# Patient Record
Sex: FEMALE | Race: AMERICAN INDIAN OR ALASKA NATIVE | NOT HISPANIC OR LATINO | Employment: FULL TIME | URBAN - METROPOLITAN AREA
[De-identification: names, ages, dates, MRNs, and addresses within clinical notes are randomized per-mention and may not be internally consistent; named-entity substitution may affect disease eponyms.]

---

## 2017-01-03 ENCOUNTER — PATIENT MESSAGE (OUTPATIENT)
Dept: GASTROENTEROLOGY | Facility: CLINIC | Age: 37
End: 2017-01-03

## 2017-01-06 ENCOUNTER — TELEPHONE (OUTPATIENT)
Dept: GASTROENTEROLOGY | Facility: CLINIC | Age: 37
End: 2017-01-06

## 2017-01-06 NOTE — TELEPHONE ENCOUNTER
Message left for patient to return my call regarding confirming her appointment with Dr.James Ross on 1/9/17 for 8:00.

## 2017-01-09 ENCOUNTER — OFFICE VISIT (OUTPATIENT)
Dept: GASTROENTEROLOGY | Facility: CLINIC | Age: 37
End: 2017-01-09
Payer: COMMERCIAL

## 2017-01-09 ENCOUNTER — LAB VISIT (OUTPATIENT)
Dept: LAB | Facility: HOSPITAL | Age: 37
End: 2017-01-09
Attending: INTERNAL MEDICINE
Payer: COMMERCIAL

## 2017-01-09 VITALS
DIASTOLIC BLOOD PRESSURE: 93 MMHG | HEART RATE: 90 BPM | WEIGHT: 100.75 LBS | BODY MASS INDEX: 19.02 KG/M2 | SYSTOLIC BLOOD PRESSURE: 133 MMHG | HEIGHT: 61 IN

## 2017-01-09 DIAGNOSIS — Z79.899 ENCOUNTER FOR LONG-TERM (CURRENT) USE OF HIGH-RISK MEDICATION: ICD-10-CM

## 2017-01-09 DIAGNOSIS — K50.819 CROHN'S DISEASE OF BOTH SMALL AND LARGE INTESTINE WITH COMPLICATION: ICD-10-CM

## 2017-01-09 DIAGNOSIS — K50.819 CROHN'S DISEASE OF BOTH SMALL AND LARGE INTESTINE WITH COMPLICATION: Primary | ICD-10-CM

## 2017-01-09 LAB
ALBUMIN SERPL BCP-MCNC: 4.2 G/DL
ALP SERPL-CCNC: 71 U/L
ALT SERPL W/O P-5'-P-CCNC: 22 U/L
ANION GAP SERPL CALC-SCNC: 10 MMOL/L
AST SERPL-CCNC: 23 U/L
BASOPHILS # BLD AUTO: 0.01 K/UL
BASOPHILS NFR BLD: 0.1 %
BILIRUB SERPL-MCNC: 0.6 MG/DL
BUN SERPL-MCNC: 9 MG/DL
CALCIUM SERPL-MCNC: 9.8 MG/DL
CHLORIDE SERPL-SCNC: 104 MMOL/L
CO2 SERPL-SCNC: 26 MMOL/L
CREAT SERPL-MCNC: 0.8 MG/DL
CRP SERPL-MCNC: 0.5 MG/L
DIFFERENTIAL METHOD: NORMAL
EOSINOPHIL # BLD AUTO: 0.1 K/UL
EOSINOPHIL NFR BLD: 1 %
ERYTHROCYTE [DISTWIDTH] IN BLOOD BY AUTOMATED COUNT: 14.2 %
EST. GFR  (AFRICAN AMERICAN): >60 ML/MIN/1.73 M^2
EST. GFR  (NON AFRICAN AMERICAN): >60 ML/MIN/1.73 M^2
GLUCOSE SERPL-MCNC: 95 MG/DL
HCT VFR BLD AUTO: 45.9 %
HGB BLD-MCNC: 15.2 G/DL
LYMPHOCYTES # BLD AUTO: 2.6 K/UL
LYMPHOCYTES NFR BLD: 35.6 %
MCH RBC QN AUTO: 30 PG
MCHC RBC AUTO-ENTMCNC: 33.1 %
MCV RBC AUTO: 91 FL
MONOCYTES # BLD AUTO: 0.9 K/UL
MONOCYTES NFR BLD: 12.4 %
NEUTROPHILS # BLD AUTO: 3.7 K/UL
NEUTROPHILS NFR BLD: 50.8 %
PLATELET # BLD AUTO: 303 K/UL
PMV BLD AUTO: 10.1 FL
POTASSIUM SERPL-SCNC: 4.3 MMOL/L
PROT SERPL-MCNC: 8.7 G/DL
RBC # BLD AUTO: 5.07 M/UL
SODIUM SERPL-SCNC: 140 MMOL/L
WBC # BLD AUTO: 7.2 K/UL

## 2017-01-09 PROCEDURE — 80053 COMPREHEN METABOLIC PANEL: CPT

## 2017-01-09 PROCEDURE — 99999 PR PBB SHADOW E&M-EST. PATIENT-LVL III: CPT | Mod: PBBFAC,,, | Performed by: INTERNAL MEDICINE

## 2017-01-09 PROCEDURE — 99214 OFFICE O/P EST MOD 30 MIN: CPT | Mod: S$GLB,,, | Performed by: INTERNAL MEDICINE

## 2017-01-09 PROCEDURE — 36415 COLL VENOUS BLD VENIPUNCTURE: CPT

## 2017-01-09 PROCEDURE — 86140 C-REACTIVE PROTEIN: CPT

## 2017-01-09 PROCEDURE — 85025 COMPLETE CBC W/AUTO DIFF WBC: CPT

## 2017-01-09 NOTE — MR AVS SNAPSHOT
[facility]  [address]  [place] [address]  Phone: [phone]  Fax: [phone]                  Ivy Salgado   2017 8:00 AM   Office Visit    Description:  Female : 1980   Provider:  Parish Ross MD   Department:  [facility]           Reason for Visit     Crohn's Disease           Diagnoses this Visit        Comments    Crohn's disease of both small and large intestine with complication    -  Primary     Encounter for long-term (current) use of high-risk medication                To Do List           Future Appointments        Provider Department Dept Phone    2017 8:45 AM LAB, APPOINTMENT [place] [facility] [phone]      Goals (5 Years of Data)     None      [facility] On [facility]     [facility] Care Line -  Assistance  Registered nurses in the [facility] provide clinical advisement, health education, appointment booking, and other advisory services.  Call for this free service at [phone].             Medications           Message regarding Medications     Verify the changes and/or additions to your medication regime listed below are the same as discussed with your clinician today.  If any of these changes or additions are incorrect, please notify your healthcare provider.             Verify that the below list of medications is an accurate representation of the medications you are currently taking.  If none reported, the list may be blank. If incorrect, please contact your healthcare provider. Carry this list with you in case of emergency.           Current Medications     alprazolam (XANAX) 0.5 MG tablet TAKE 1 TABLET BY MOUTH TWICE DAILY    certolizumab pegol (CIMZIA) 400 mg/2 mL (200 mg/mL x 2) SyKt kit Inject 400 mg (2 mLs total) into the skin every 28 days.    diphenoxylate-atropine 2.5-0.025 mg (LOMOTIL) 2.5-0.025 mg per tablet Take 1 tablet by mouth 3 (three) times daily as  "needed for Diarrhea.    hyoscyamine (LEVSIN/SL) 0.125 mg Subl Place 1 tablet (0.125 mg total) under the tongue every 4 (four) hours as needed.    imipramine (TOFRANIL) 25 MG tablet Take 1 tablet (25 mg total) by mouth every evening.    lisinopril 10 MG tablet TAKE 1 TABLET (10 MG TOTAL) BY MOUTH ONCE DAILY.    norethindrone-ethinyl estradiol (GILDESS FE 1/20, 28,) 1 mg-20 mcg (21)/75 mg (7) per tablet Take 1 tablet by mouth once daily. Take one active pill daily continuously.    ondansetron (ZOFRAN-ODT) 8 MG TbDL Take 1 tablet (8 mg total) by mouth every 8 (eight) hours as needed (nausea).    promethazine (PHENERGAN) 25 MG tablet Take 1 tablet (25 mg total) by mouth every 6 (six) hours as needed for Nausea.    sumatriptan (IMITREX) 100 MG tablet     triamcinolone acetonide 0.1% (KENALOG) 0.1 % paste Apply thin coat of paste to ulceration prn    valacyclovir (VALTREX) 500 MG tablet Take 1 tablet (500 mg total) by mouth once daily.    zolpidem (AMBIEN) 10 mg Tab Take 1 tablet (10 mg total) by mouth nightly as needed.    miconazole nitrate 2% (MICOTIN) 2 % Oint Apply topically 2 (two) times daily.           Clinical Reference Information           Vital Signs - Last Recorded  Most recent update: 1/9/2017  8:09 AM by Belinda Sullivan MA    BP Pulse Ht Wt LMP BMI    (!) 133/93 90 5' 1" (1.549 m) 45.7 kg (100 lb 12 oz) 03/30/2015 19.04 kg/m2      Blood Pressure          Most Recent Value    BP  (!)  133/93      Allergies as of 1/9/2017     Azathioprine Sodium    Methotrexate    Morphine      Immunizations Administered on Date of Encounter - 1/9/2017     None      Orders Placed During Today's Visit     Future Labs/Procedures Expected by Expires    C-reactive protein  1/9/2017 1/9/2018    CBC auto differential  1/9/2017 1/9/2018    Clostridium difficile EIA  1/9/2017 3/10/2018    Comprehensive metabolic panel  1/9/2017 1/9/2018      "

## 2017-01-09 NOTE — PROGRESS NOTES
Subjective:       Patient ID: Ivy Salgado is a 36 y.o. female.    Chief Complaint: Crohn's Disease    HPI  Review of Systems   Constitutional: Positive for activity change, appetite change, fatigue, fever and unexpected weight change. Negative for chills and diaphoresis.   HENT: Negative for congestion, ear pain, mouth sores, nosebleeds, postnasal drip, rhinorrhea, sinus pressure, sore throat, trouble swallowing and voice change.    Eyes: Negative for pain.   Respiratory: Negative for cough, shortness of breath and wheezing.    Cardiovascular: Negative for chest pain, palpitations and leg swelling.   Genitourinary: Negative for difficulty urinating, dysuria, flank pain, hematuria and menstrual problem.   Musculoskeletal: Positive for arthralgias. Negative for back pain, gait problem, joint swelling, myalgias and neck pain.   Skin: Negative for rash.   Neurological: Positive for headaches. Negative for dizziness, tremors, syncope and numbness.   Hematological: Negative for adenopathy. Does not bruise/bleed easily.   Psychiatric/Behavioral: Negative for agitation, behavioral problems, confusion, decreased concentration and dysphoric mood. The patient is not nervous/anxious.        Objective:      Physical Exam   Constitutional: She is oriented to person, place, and time. She appears well-developed and well-nourished. No distress.   HENT:   Head: Normocephalic and atraumatic.   Right Ear: External ear normal.   Left Ear: External ear normal.   Nose: Nose normal.   Mouth/Throat: Oropharynx is clear and moist. No oropharyngeal exudate.   Eyes: Conjunctivae are normal. Pupils are equal, round, and reactive to light. No scleral icterus.   Neck: Normal range of motion. Neck supple. No thyromegaly present.   Cardiovascular: Normal rate, regular rhythm, normal heart sounds and intact distal pulses.  Exam reveals no gallop.    No murmur heard.  Pulmonary/Chest: Effort normal and breath sounds normal. She has no wheezes.  She has no rales.   Abdominal: Soft. Bowel sounds are normal. She exhibits no distension and no mass. There is no tenderness. No hernia.   Musculoskeletal: Normal range of motion. She exhibits no edema or tenderness.   Lymphadenopathy:     She has no cervical adenopathy.   Neurological: She is alert and oriented to person, place, and time. No cranial nerve deficit.   Skin: Skin is warm and dry. No rash noted.   Psychiatric: She has a normal mood and affect. Her behavior is normal. Judgment and thought content normal.       Assessment:       1. Crohn's disease of both small and large intestine with complication    2. Encounter for long-term (current) use of high-risk medication        Plan:         This is a followup visit for Ivy.  A 36-year-old lady with longstanding   severe Crohn's disease, status post colectomy and ileostomy.  She is on Cimzia   right now.    She had a prolonged hospitalization in October for nearly three weeks for   partial small-bowel obstruction and increase of symptoms.  I saw her on   11/30/2016 afterwards.  At that time, I added imipramine to her medicines,   continue Zofran and continue Cimzia.    She comes in now and she is starting to deteriorate and has a number of symptoms   getting worse.  She continues with the nausea.  She is unable to eat very much   at all.  With any food intake, she will get cramping pain across the lower   abdomen.  This is a band-like pain across the abdomen without radiation.  There   is some increase in the pain right below her right lower quadrant ileostomy.    She has noticed increased watery output from her ileostomy.  The stool seems to   be more foul smelling.  She did have recent antibiotics.  She has not had C.   difficile infection in three years, but wonders whether this could be some to   what she had in the past.  She has the nausea, but no vomiting.  She is off her   prednisone.  She did have antibiotics a couple of weeks ago for an upper    respiratory infection.    ASSESSMENT AND DECISION MAKIN.  Crohn's disease.  She certainly had very difficult to control disease in the   past.  Workup on her last admit, did not show any signs of active disease that   workup included CT scan, EGD and ileoscopy.  She remains at high-risk for   disease recurrence.  I do not know what the status of the disease is in   relationship to her current symptoms.  2.  Long-term use of high-risk medicine.  3.  Chronic diarrhea.  This is her severe and active problem now.  She is having   pain, nausea and increased output.  She is at risk for complications, which   include dehydration and electrolyte abnormalities that may require another   admission to the hospital.  My first priority is to rule out C. difficile.  If   it is positive, that will be her third episode, but first in several years.  I   will probably treat with vancomycin.  We discussed fecal transplant, which would   be a novel at best, in her considering she does not have colon.  I will review   the case with Dr. Klein, perhaps we would consider a push enteroscopy from above   with the insulation of the fluid that might be more efficacious and trying to do   it through an ileoscopy.  If she does not have C. difficile now, I am not sure,   how would account for her current symptoms.  I will get labs right now to rule   out any electrolyte abnormalities or dehydration.  We will check a CRP as well.      KAROLINA/IN  dd: 2017 08:35:29 (CST)  td: 01/10/2017 02:56:12 (CST)  Doc ID   #[id number]  Job ID #[id number]    CC:

## 2017-01-10 ENCOUNTER — PATIENT MESSAGE (OUTPATIENT)
Dept: GASTROENTEROLOGY | Facility: CLINIC | Age: 37
End: 2017-01-10

## 2017-01-10 ENCOUNTER — TELEPHONE (OUTPATIENT)
Dept: GASTROENTEROLOGY | Facility: CLINIC | Age: 37
End: 2017-01-10

## 2017-01-10 ENCOUNTER — INFUSION (OUTPATIENT)
Dept: INFECTIOUS DISEASES | Facility: HOSPITAL | Age: 37
End: 2017-01-10
Attending: INTERNAL MEDICINE
Payer: COMMERCIAL

## 2017-01-10 VITALS — BODY MASS INDEX: 18.88 KG/M2 | HEIGHT: 61 IN | WEIGHT: 100 LBS

## 2017-01-10 DIAGNOSIS — K50.019 CROHN'S DISEASE OF SMALL INTESTINE WITH COMPLICATION: Primary | ICD-10-CM

## 2017-01-10 DIAGNOSIS — R19.7 ACUTE DIARRHEA: Primary | ICD-10-CM

## 2017-01-10 PROCEDURE — 96361 HYDRATE IV INFUSION ADD-ON: CPT

## 2017-01-10 PROCEDURE — 96360 HYDRATION IV INFUSION INIT: CPT

## 2017-01-10 PROCEDURE — 25000003 PHARM REV CODE 250: Performed by: INTERNAL MEDICINE

## 2017-01-10 RX ORDER — HEPARIN SODIUM (PORCINE) LOCK FLUSH IV SOLN 100 UNIT/ML 100 UNIT/ML
100 SOLUTION INTRAVENOUS
Status: CANCELLED | OUTPATIENT
Start: 2017-01-10

## 2017-01-10 RX ORDER — SODIUM CHLORIDE 0.9 % (FLUSH) 0.9 %
10 SYRINGE (ML) INJECTION
Status: CANCELLED | OUTPATIENT
Start: 2017-01-10

## 2017-01-10 RX ADMIN — SODIUM CHLORIDE 2000 ML: 0.9 INJECTION, SOLUTION INTRAVENOUS at 01:01

## 2017-01-10 NOTE — TELEPHONE ENCOUNTER
----- Message from Parish Ross MD sent at 1/9/2017  4:50 PM CST -----  Please call, the c diff is negative, but i would like to repeat later this week or early next week

## 2017-01-10 NOTE — PLAN OF CARE
Problem: Patient Care Overview (Adult)  Goal: Adult Individualization and Mutuality  Outcome: Ongoing (interventions implemented as appropriate)  Pt educated on hand washing and infection control.  Pt verbalized understanding.

## 2017-01-17 RX ORDER — ZOLPIDEM TARTRATE 10 MG/1
10 TABLET ORAL NIGHTLY PRN
Qty: 30 TABLET | Refills: 0 | Status: SHIPPED | OUTPATIENT
Start: 2017-01-17 | End: 2017-02-17 | Stop reason: SDUPTHER

## 2017-01-18 ENCOUNTER — LAB VISIT (OUTPATIENT)
Dept: LAB | Facility: HOSPITAL | Age: 37
End: 2017-01-18
Attending: INTERNAL MEDICINE
Payer: COMMERCIAL

## 2017-01-18 ENCOUNTER — PATIENT MESSAGE (OUTPATIENT)
Dept: GASTROENTEROLOGY | Facility: CLINIC | Age: 37
End: 2017-01-18

## 2017-01-18 DIAGNOSIS — R19.7 ACUTE DIARRHEA: ICD-10-CM

## 2017-01-18 LAB
C DIFF GDH STL QL: NEGATIVE
C DIFF TOX A+B STL QL IA: NEGATIVE

## 2017-01-18 PROCEDURE — 87449 NOS EACH ORGANISM AG IA: CPT

## 2017-01-20 ENCOUNTER — PATIENT MESSAGE (OUTPATIENT)
Dept: GASTROENTEROLOGY | Facility: CLINIC | Age: 37
End: 2017-01-20

## 2017-01-20 RX ORDER — ALPRAZOLAM 0.5 MG/1
TABLET ORAL
Qty: 60 TABLET | Refills: 0 | Status: ON HOLD | OUTPATIENT
Start: 2017-01-20 | End: 2017-02-20 | Stop reason: SDUPTHER

## 2017-01-25 ENCOUNTER — INFUSION (OUTPATIENT)
Dept: INFECTIOUS DISEASES | Facility: HOSPITAL | Age: 37
End: 2017-01-25
Attending: INTERNAL MEDICINE
Payer: COMMERCIAL

## 2017-01-25 VITALS — BODY MASS INDEX: 18.88 KG/M2 | HEIGHT: 61 IN | WEIGHT: 100 LBS

## 2017-01-25 DIAGNOSIS — K50.019 CROHN'S DISEASE OF SMALL INTESTINE WITH COMPLICATION: Primary | ICD-10-CM

## 2017-01-25 PROCEDURE — 25000003 PHARM REV CODE 250: Performed by: INTERNAL MEDICINE

## 2017-01-25 PROCEDURE — 96360 HYDRATION IV INFUSION INIT: CPT

## 2017-01-25 RX ORDER — SODIUM CHLORIDE 9 MG/ML
INJECTION, SOLUTION INTRAVENOUS ONCE
Status: CANCELLED
Start: 2017-02-01 | End: 2017-02-01

## 2017-01-25 RX ORDER — HEPARIN SODIUM (PORCINE) LOCK FLUSH IV SOLN 100 UNIT/ML 100 UNIT/ML
100 SOLUTION INTRAVENOUS
Status: CANCELLED | OUTPATIENT
Start: 2017-01-25

## 2017-01-25 RX ORDER — SODIUM CHLORIDE 0.9 % (FLUSH) 0.9 %
10 SYRINGE (ML) INJECTION
Status: CANCELLED | OUTPATIENT
Start: 2017-01-25

## 2017-01-25 RX ORDER — SODIUM CHLORIDE 9 MG/ML
INJECTION, SOLUTION INTRAVENOUS ONCE
Status: COMPLETED | OUTPATIENT
Start: 2017-01-25 | End: 2017-01-25

## 2017-01-25 RX ADMIN — SODIUM CHLORIDE: 0.9 INJECTION, SOLUTION INTRAVENOUS at 09:01

## 2017-02-01 ENCOUNTER — INFUSION (OUTPATIENT)
Dept: INFECTIOUS DISEASES | Facility: HOSPITAL | Age: 37
End: 2017-02-01
Attending: INTERNAL MEDICINE
Payer: COMMERCIAL

## 2017-02-01 VITALS
WEIGHT: 100 LBS | HEIGHT: 61 IN | DIASTOLIC BLOOD PRESSURE: 87 MMHG | RESPIRATION RATE: 18 BRPM | HEART RATE: 102 BPM | BODY MASS INDEX: 18.88 KG/M2 | TEMPERATURE: 99 F | SYSTOLIC BLOOD PRESSURE: 139 MMHG

## 2017-02-01 DIAGNOSIS — K50.019 CROHN'S DISEASE OF SMALL INTESTINE WITH COMPLICATION: Primary | ICD-10-CM

## 2017-02-01 PROCEDURE — 25000003 PHARM REV CODE 250: Performed by: INTERNAL MEDICINE

## 2017-02-01 PROCEDURE — 96360 HYDRATION IV INFUSION INIT: CPT

## 2017-02-01 RX ORDER — SODIUM CHLORIDE 9 MG/ML
INJECTION, SOLUTION INTRAVENOUS ONCE
Status: CANCELLED
Start: 2017-02-08 | End: 2017-02-08

## 2017-02-01 RX ORDER — SODIUM CHLORIDE 0.9 % (FLUSH) 0.9 %
10 SYRINGE (ML) INJECTION
Status: CANCELLED | OUTPATIENT
Start: 2017-02-08

## 2017-02-01 RX ORDER — HEPARIN SODIUM (PORCINE) LOCK FLUSH IV SOLN 100 UNIT/ML 100 UNIT/ML
100 SOLUTION INTRAVENOUS
Status: CANCELLED | OUTPATIENT
Start: 2017-02-08

## 2017-02-01 RX ADMIN — SODIUM CHLORIDE 1000 ML: 0.9 INJECTION, SOLUTION INTRAVENOUS at 09:02

## 2017-02-07 RX ORDER — SUMATRIPTAN SUCCINATE 100 MG/1
TABLET ORAL
Qty: 9 TABLET | Refills: 1 | Status: SHIPPED | OUTPATIENT
Start: 2017-02-07 | End: 2017-06-30 | Stop reason: SDUPTHER

## 2017-02-08 ENCOUNTER — PATIENT MESSAGE (OUTPATIENT)
Dept: OBSTETRICS AND GYNECOLOGY | Facility: CLINIC | Age: 37
End: 2017-02-08

## 2017-02-08 ENCOUNTER — PATIENT MESSAGE (OUTPATIENT)
Dept: GASTROENTEROLOGY | Facility: CLINIC | Age: 37
End: 2017-02-08

## 2017-02-08 ENCOUNTER — INFUSION (OUTPATIENT)
Dept: INFECTIOUS DISEASES | Facility: HOSPITAL | Age: 37
End: 2017-02-08
Attending: INTERNAL MEDICINE
Payer: COMMERCIAL

## 2017-02-08 VITALS — WEIGHT: 100 LBS | HEIGHT: 61 IN | BODY MASS INDEX: 18.88 KG/M2

## 2017-02-08 DIAGNOSIS — K50.019 CROHN'S DISEASE OF SMALL INTESTINE WITH COMPLICATION: Primary | ICD-10-CM

## 2017-02-08 PROCEDURE — 96361 HYDRATE IV INFUSION ADD-ON: CPT

## 2017-02-08 PROCEDURE — 25000003 PHARM REV CODE 250: Performed by: INTERNAL MEDICINE

## 2017-02-08 PROCEDURE — 96360 HYDRATION IV INFUSION INIT: CPT

## 2017-02-08 RX ORDER — SODIUM CHLORIDE 0.9 % (FLUSH) 0.9 %
10 SYRINGE (ML) INJECTION
Status: CANCELLED | OUTPATIENT
Start: 2017-02-15

## 2017-02-08 RX ORDER — HEPARIN SODIUM (PORCINE) LOCK FLUSH IV SOLN 100 UNIT/ML 100 UNIT/ML
100 SOLUTION INTRAVENOUS
Status: CANCELLED | OUTPATIENT
Start: 2017-02-15

## 2017-02-08 RX ORDER — SODIUM CHLORIDE 9 MG/ML
INJECTION, SOLUTION INTRAVENOUS ONCE
Status: CANCELLED
Start: 2017-02-15 | End: 2017-02-15

## 2017-02-08 RX ADMIN — SODIUM CHLORIDE 1000 ML: 0.9 INJECTION, SOLUTION INTRAVENOUS at 09:02

## 2017-02-10 ENCOUNTER — OFFICE VISIT (OUTPATIENT)
Dept: OBSTETRICS AND GYNECOLOGY | Facility: CLINIC | Age: 37
End: 2017-02-10
Payer: COMMERCIAL

## 2017-02-10 VITALS
SYSTOLIC BLOOD PRESSURE: 132 MMHG | HEIGHT: 61 IN | DIASTOLIC BLOOD PRESSURE: 92 MMHG | WEIGHT: 103.81 LBS | BODY MASS INDEX: 19.6 KG/M2

## 2017-02-10 DIAGNOSIS — N94.10 DYSPAREUNIA IN FEMALE: ICD-10-CM

## 2017-02-10 DIAGNOSIS — N89.8 VAGINAL DISCHARGE: Primary | ICD-10-CM

## 2017-02-10 LAB
CANDIDA RRNA VAG QL PROBE: NEGATIVE
G VAGINALIS RRNA GENITAL QL PROBE: POSITIVE
T VAGINALIS RRNA GENITAL QL PROBE: NEGATIVE

## 2017-02-10 PROCEDURE — 87480 CANDIDA DNA DIR PROBE: CPT

## 2017-02-10 PROCEDURE — 99999 PR PBB SHADOW E&M-EST. PATIENT-LVL III: CPT | Mod: PBBFAC,,, | Performed by: OBSTETRICS & GYNECOLOGY

## 2017-02-10 PROCEDURE — 99213 OFFICE O/P EST LOW 20 MIN: CPT | Mod: S$GLB,,, | Performed by: OBSTETRICS & GYNECOLOGY

## 2017-02-10 NOTE — PROGRESS NOTES
OBSTETRIC HISTORY:   OB History      Para Term  AB TAB SAB Ectopic Multiple Living    2 1 1 0 1 0 1 0 0 1         COMPREHENSIVE GYN HISTORY:  PAP History: Reports abnormal Paps. Date:  and  Treatment: Colposcopy and CKC Result of Colpo 10/3/12: ECC: Negative ECTO: Koilocytosis (aka LYLA 1)  Pap #2/3: Date:  Result: Normal  Pap #1/3: Date: 13 Result: LSIL  Infection History: Reports STDs: Herpes. Denies PID.  Benign History: Denies uterine fibroids. Reports ovarian cysts. Denies endometriosis.   Cancer History: Denies cervical cancer. Denies uterine cancer or hyperplasia. Denies ovarian cancer. Denies vulvar cancer or pre-cancer. Denies vaginal cancer or pre-cancer. Denies breast cancer. Denies colon cancer.  Sexual Activity History:  reports that she currently engages in sexual activity. She reports using the following methods of birth control/protection: Pill and Surgical.   Menstrual History: Every 28 days, flows for 3 days. Moderate flow.  Dysmenorrhea History: Reports severe dysmenorrhea.  Contraception: Hysterectomy      HPI:   36 y.o.  Patient's last menstrual period was 2015.   Patient is  here complaining of pain with intercourse and lasts even for 2-3 days after. Feels a pressure when she urinates like when she had an ovarian cyst. She denies bladder, breast and bowel complaints.    ROS:  GENERAL: Denies weight gain or weight loss. Feeling well overall.   SKIN: Denies rash or lesions.   HEAD: Denies headache.   NODES: Denies enlarged lymph nodes.   CHEST: Denies shortness of breath.   ABDOMEN: No constipation, diarrhea, nausea, vomiting or rectal bleeding.   URINARY: No frequency, dysuria, hematuria, or burning on urination.  REPRODUCTIVE: See HPI.   BREASTS: The patient denies pain, lumps, or nipple discharge.   HEMATOLOGIC: No easy bruisability.   MUSCULOSKELETAL: Denies joint pain. + back pain.   NEUROLOGIC: Denies weakness.   PSYCHIATRIC: Denies depression, anxiety or  "mood swings.    PE:   Visit Vitals    BP (!) 132/92    Ht 5' 1" (1.549 m)    Wt 47.1 kg (103 lb 13.4 oz)    LMP 03/30/2015    BMI 19.62 kg/m2     APPEARANCE: Well nourished, well developed, in no acute distress.  ABDOMEN: Soft. No tenderness or masses. No hernias. Asymmetric pelvic crests   PELVIC:   VULVA: No lesions. Normal female genitalia.  URETHRAL MEATUS: Normal size and location, no lesions, no prolapse.  URETHRA: No masses, tenderness, prolapse or scarring.  VAGINA: Moist and well rugated, some discharge, no significant cystocele or rectocele.  CERVIX: and UTERUS: Surgically absent.  ADNEXA: Right adnexal fullness and mild discomfort. +levator ani and perineal tenderness (most discomfort at theses locations and left pelvic muscles).     ASSESSMENT:  Dyspareunia--pelvic floor dysfunction  Right adnexal fullness-- will hold off on any studies at this time as even if ultrasound shows scar tissue or ovarian remnant I would be hesitant to operate secondary to severe adhesions at time of hysterectomy. Right ovary was seen on pathology report  Vaginal discharge--Affirm done  Patient counseled on SI joint and pelvic floor pain. Increase exercises that help with the core. Taught Kegel relaxation technique.          PLAN:  RTO for annual      "

## 2017-02-12 ENCOUNTER — PATIENT MESSAGE (OUTPATIENT)
Dept: OBSTETRICS AND GYNECOLOGY | Facility: HOSPITAL | Age: 37
End: 2017-02-12

## 2017-02-12 RX ORDER — METRONIDAZOLE 500 MG/1
500 TABLET ORAL EVERY 12 HOURS
Qty: 14 TABLET | Refills: 0 | Status: SHIPPED | OUTPATIENT
Start: 2017-02-12 | End: 2017-02-19

## 2017-02-13 ENCOUNTER — PATIENT MESSAGE (OUTPATIENT)
Dept: GASTROENTEROLOGY | Facility: CLINIC | Age: 37
End: 2017-02-13

## 2017-02-13 ENCOUNTER — PATIENT MESSAGE (OUTPATIENT)
Dept: OBSTETRICS AND GYNECOLOGY | Facility: CLINIC | Age: 37
End: 2017-02-13

## 2017-02-14 DIAGNOSIS — K50.80 CROHN'S DISEASE OF BOTH SMALL AND LARGE INTESTINE WITHOUT COMPLICATION: Primary | ICD-10-CM

## 2017-02-15 ENCOUNTER — OFFICE VISIT (OUTPATIENT)
Dept: SURGERY | Facility: CLINIC | Age: 37
End: 2017-02-15
Payer: COMMERCIAL

## 2017-02-15 VITALS — BODY MASS INDEX: 19.83 KG/M2 | WEIGHT: 105 LBS | HEIGHT: 61 IN | TEMPERATURE: 98 F

## 2017-02-15 DIAGNOSIS — K50.019 CROHN'S DISEASE OF SMALL INTESTINE WITH COMPLICATION: Primary | Chronic | ICD-10-CM

## 2017-02-15 PROCEDURE — 99204 OFFICE O/P NEW MOD 45 MIN: CPT | Mod: S$GLB,,, | Performed by: SURGERY

## 2017-02-15 PROCEDURE — 99999 PR PBB SHADOW E&M-EST. PATIENT-LVL III: CPT | Mod: PBBFAC,,, | Performed by: SURGERY

## 2017-02-15 RX ORDER — LIDOCAINE HYDROCHLORIDE 10 MG/ML
1 INJECTION, SOLUTION EPIDURAL; INFILTRATION; INTRACAUDAL; PERINEURAL ONCE
Status: CANCELLED | OUTPATIENT
Start: 2017-02-15 | End: 2017-02-15

## 2017-02-15 NOTE — PROGRESS NOTES
History & Physical    SUBJECTIVE:     History of Present Illness:  Pt with h/o sig Crohn's disease and currently receiving weekly Cimzia infusions.  Currently having more and more difficulty with IV placements and increased difficulty with lab draws.  Presents interested in port placemet for medication infusion.      Chief Complaint   Patient presents with    Consult       Review of patient's allergies indicates:   Allergen Reactions    Azathioprine sodium      Other reaction(s): pancreatitis  Other reaction(s): pancreatitis    Methotrexate      Other reaction(s): infection-    Morphine Itching and Other (See Comments)     Other reaction(s): Itching       Current Outpatient Prescriptions   Medication Sig Dispense Refill    alprazolam (XANAX) 0.5 MG tablet TAKE 1 TABLET BY MOUTH TWICE DAILY 60 tablet 0    certolizumab pegol (CIMZIA) 400 mg/2 mL (200 mg/mL x 2) SyKt kit Inject 400 mg (2 mLs total) into the skin every 28 days. 5 each 3    diphenoxylate-atropine 2.5-0.025 mg (LOMOTIL) 2.5-0.025 mg per tablet Take 1 tablet by mouth 3 (three) times daily as needed for Diarrhea. 90 tablet 3    hyoscyamine (LEVSIN/SL) 0.125 mg Subl Place 1 tablet (0.125 mg total) under the tongue every 4 (four) hours as needed. 60 tablet 1    lisinopril 10 MG tablet TAKE 1 TABLET (10 MG TOTAL) BY MOUTH ONCE DAILY. 90 tablet 3    metronidazole (FLAGYL) 500 MG tablet Take 1 tablet (500 mg total) by mouth every 12 (twelve) hours. No alcohol while taking medicine 14 tablet 0    norethindrone-ethinyl estradiol (GILDESS FE 1/20, 28,) 1 mg-20 mcg (21)/75 mg (7) per tablet Take 1 tablet by mouth once daily. Take one active pill daily continuously. 28 tablet 11    ondansetron (ZOFRAN-ODT) 8 MG TbDL Take 1 tablet (8 mg total) by mouth every 8 (eight) hours as needed (nausea). 30 tablet 2    promethazine (PHENERGAN) 25 MG tablet Take 1 tablet (25 mg total) by mouth every 6 (six) hours as needed for Nausea. 30 tablet 3    sumatriptan  (IMITREX) 100 MG tablet TAKE 1 TABLET BY MOUTH AS NEEDED FOR HEADACHE MAY REPEAT ONCE IN 2 HRS MAX 2 DOSES IN 24 HOURS 9 tablet 1    valacyclovir (VALTREX) 500 MG tablet Take 1 tablet (500 mg total) by mouth once daily. 30 tablet 3    zolpidem (AMBIEN) 10 mg Tab Take 1 tablet (10 mg total) by mouth nightly as needed. 30 tablet 0     No current facility-administered medications for this visit.        Past Medical History   Diagnosis Date    Abnormal Pap smear 2007    Abnormal Pap smear 5/26/2011    Anxiety     C. difficile diarrhea     Crohn's disease     Encounter for blood transfusion     Genital HSV     History of colposcopy with cervical biopsy 2007 and 7/2011 2007-LYLA I  and 7/2011- LYLA I    Hypertension     Kidney stone     Recurrent UTI 4/3/2013    S/P ileostomy 7/9/2012    Sterilization 6/23/2012     Past Surgical History   Procedure Laterality Date    Total colectomy      Ileostomy      Colon surgery      Upper gastrointestinal endoscopy      Small intestine surgery      Colonoscopy      Tubal ligation  06 06 2012    Ckc      Appendectomy      Bladder surgery       partial cystectomy due to fistula    Skin biopsy      Abdominal surgery      Hysterectomy  04/16/2015     SHELLIE    Oophorectomy Right 04/16/2015     Family History   Problem Relation Age of Onset    Colon cancer Father     Cancer Father      colon cancer    Hypertension Mother     Cancer Maternal Grandfather      esopghal     Skin cancer Maternal Grandfather     Endometrial cancer Maternal Aunt     Crohn's disease Brother     Breast cancer Neg Hx     Ovarian cancer Neg Hx      Social History   Substance Use Topics    Smoking status: Never Smoker    Smokeless tobacco: Never Used    Alcohol use 0.0 oz/week     0 Standard drinks or equivalent per week      Comment: Patient only drinks wine not regular basis.        Review of Systems:  Review of Systems   Constitutional: Negative for activity change, appetite  "change, chills, diaphoresis, fatigue and fever.   HENT: Negative for congestion, postnasal drip, rhinorrhea, sinus pressure, sneezing, sore throat and tinnitus.    Eyes: Negative for photophobia, pain, discharge, redness, itching and visual disturbance.   Respiratory: Negative for apnea, cough, choking, chest tightness, shortness of breath, wheezing and stridor.    Cardiovascular: Negative for chest pain, palpitations and leg swelling.   Gastrointestinal: Positive for diarrhea. Negative for abdominal distention, abdominal pain, constipation, nausea and vomiting.   Musculoskeletal: Negative for arthralgias, back pain and joint swelling.   Skin: Negative for color change, pallor and rash.   Neurological: Negative for dizziness, facial asymmetry, light-headedness, numbness and headaches.   Hematological: Negative for adenopathy.   Psychiatric/Behavioral: Negative for agitation, behavioral problems, confusion and decreased concentration.       OBJECTIVE:     Vital Signs (Most Recent)  Temp: 98.3 °F (36.8 °C) (02/15/17 1634)  5' 1" (1.549 m)  47.6 kg (105 lb)     Physical Exam:  Physical Exam   Constitutional: She is oriented to person, place, and time. She appears well-developed and well-nourished.   HENT:   Head: Normocephalic and atraumatic.   Right Ear: External ear normal.   Left Ear: External ear normal.   Eyes: Conjunctivae and EOM are normal. Right eye exhibits no discharge. Left eye exhibits no discharge. No scleral icterus.   Neck: Normal range of motion. Neck supple. No thyromegaly present.   Cardiovascular: Normal rate and regular rhythm.    Pulmonary/Chest: Effort normal. No respiratory distress.   Abdominal: Soft. Bowel sounds are normal. She exhibits no distension and no mass. There is no tenderness. There is no rebound and no guarding. No hernia.   Musculoskeletal: Normal range of motion. She exhibits no edema or tenderness.   Neurological: She is alert and oriented to person, place, and time.   Skin: " Skin is warm and dry. No rash noted. No erythema. No pallor.   Psychiatric: She has a normal mood and affect. Her behavior is normal. Judgment and thought content normal.         ASSESSMENT/PLAN:     Crohns with weekly infusions    PLAN:Plan     To OR for port placement  Consent obtained  Call with questions

## 2017-02-15 NOTE — LETTER
February 15, 2017      Parish Ross MD  [address] [facility] [address]           [facility] - General Surgery  [address]  [facility] [address]  Phone: [phone]          Patient: Ivy Salgado   MR Number: [id number]   YOB: 1980   Date of Visit: 2/15/2017       Dear Dr. Parish Ross:    Thank you for referring Ivy Salgado to me for evaluation. Attached you will find relevant portions of my assessment and plan of care.    If you have questions, please do not hesitate to call me. I look forward to following Ivy Salgado along with you.    Sincerely,    Parish Sanchez Jr., MD    Enclosure  CC:  No Recipients    If you would like to receive this communication electronically, please contact [email or web address] or [phone] to request more information on [employer] access.    For providers and/or their staff who would like to refer a patient to [facility], please contact us through our one-stop-shop provider referral line, [facility], at [phone].    If you feel you have received this communication in error or would no longer like to receive these types of communications, please e-mail [email or web address]

## 2017-02-16 ENCOUNTER — INFUSION (OUTPATIENT)
Dept: INFECTIOUS DISEASES | Facility: HOSPITAL | Age: 37
End: 2017-02-16
Attending: INTERNAL MEDICINE
Payer: COMMERCIAL

## 2017-02-16 VITALS — HEIGHT: 61 IN | WEIGHT: 105 LBS | BODY MASS INDEX: 19.83 KG/M2

## 2017-02-16 DIAGNOSIS — K50.019 CROHN'S DISEASE OF SMALL INTESTINE WITH COMPLICATION: Primary | ICD-10-CM

## 2017-02-16 PROCEDURE — 96360 HYDRATION IV INFUSION INIT: CPT

## 2017-02-16 PROCEDURE — 25000003 PHARM REV CODE 250: Performed by: INTERNAL MEDICINE

## 2017-02-16 RX ORDER — SODIUM CHLORIDE 0.9 % (FLUSH) 0.9 %
10 SYRINGE (ML) INJECTION
Status: CANCELLED | OUTPATIENT
Start: 2017-02-22

## 2017-02-16 RX ORDER — HEPARIN SODIUM (PORCINE) LOCK FLUSH IV SOLN 100 UNIT/ML 100 UNIT/ML
100 SOLUTION INTRAVENOUS
Status: CANCELLED | OUTPATIENT
Start: 2017-02-22

## 2017-02-16 RX ORDER — SODIUM CHLORIDE 9 MG/ML
INJECTION, SOLUTION INTRAVENOUS ONCE
Status: CANCELLED
Start: 2017-02-22 | End: 2017-02-22

## 2017-02-16 RX ADMIN — SODIUM CHLORIDE 1000 ML: 0.9 INJECTION, SOLUTION INTRAVENOUS at 08:02

## 2017-02-16 NOTE — PLAN OF CARE
Problem: Health Knowledge, Opportunity for Enhanced (Adult,NICU,,Obstetrics,Pediatric)  Goal: Knowledgeable about Health Subject/Topic  Patient will demonstrate the desired outcomes by discharge/transition of care.   Outcome: Ongoing (interventions implemented as appropriate)  Pt educated on hand washing and infection control.  Pt verbalized understanding.

## 2017-02-17 ENCOUNTER — PATIENT MESSAGE (OUTPATIENT)
Dept: INTERNAL MEDICINE | Facility: CLINIC | Age: 37
End: 2017-02-17

## 2017-02-17 RX ORDER — ZOLPIDEM TARTRATE 10 MG/1
TABLET ORAL
Qty: 30 TABLET | Refills: 0 | Status: SHIPPED | OUTPATIENT
Start: 2017-02-17 | End: 2017-03-17

## 2017-02-20 ENCOUNTER — TELEPHONE (OUTPATIENT)
Dept: SURGERY | Facility: CLINIC | Age: 37
End: 2017-02-20

## 2017-02-20 ENCOUNTER — ANESTHESIA EVENT (OUTPATIENT)
Dept: SURGERY | Facility: HOSPITAL | Age: 37
End: 2017-02-20
Payer: COMMERCIAL

## 2017-02-20 NOTE — TELEPHONE ENCOUNTER
Left  for pt notifying her that her surgery is scheduled for 0700 on 2/21/17. she needs to arrive to the 2nd floor DOSC in the hospital @ 0530. she should not eat or drink anything after midnight. Post op Appointment reminder mailed.

## 2017-02-20 NOTE — TELEPHONE ENCOUNTER
----- Message from Kyle Wells sent at 2/20/2017  1:05 PM CST -----  Pt is returning missed phone call. Please call pt at ext [phone]

## 2017-02-21 ENCOUNTER — HOSPITAL ENCOUNTER (OUTPATIENT)
Facility: HOSPITAL | Age: 37
Discharge: HOME | End: 2017-02-21
Attending: SURGERY | Admitting: SURGERY
Payer: COMMERCIAL

## 2017-02-21 ENCOUNTER — ANESTHESIA (OUTPATIENT)
Dept: SURGERY | Facility: HOSPITAL | Age: 37
End: 2017-02-21
Payer: COMMERCIAL

## 2017-02-21 VITALS
TEMPERATURE: 98 F | DIASTOLIC BLOOD PRESSURE: 61 MMHG | SYSTOLIC BLOOD PRESSURE: 98 MMHG | HEART RATE: 72 BPM | RESPIRATION RATE: 18 BRPM | OXYGEN SATURATION: 100 %

## 2017-02-21 DIAGNOSIS — K50.019 CROHN'S DISEASE OF SMALL INTESTINE WITH COMPLICATION: Chronic | ICD-10-CM

## 2017-02-21 PROCEDURE — 77001 FLUOROGUIDE FOR VEIN DEVICE: CPT | Mod: 26,,, | Performed by: SURGERY

## 2017-02-21 PROCEDURE — 25000003 PHARM REV CODE 250: Performed by: NURSE ANESTHETIST, CERTIFIED REGISTERED

## 2017-02-21 PROCEDURE — 71000016 HC POSTOP RECOV ADDL HR: Performed by: SURGERY

## 2017-02-21 PROCEDURE — C1788 PORT, INDWELLING, IMP: HCPCS | Performed by: SURGERY

## 2017-02-21 PROCEDURE — 36561 INSERT TUNNELED CV CATH: CPT | Mod: ,,, | Performed by: SURGERY

## 2017-02-21 PROCEDURE — 25000003 PHARM REV CODE 250: Performed by: ANESTHESIOLOGY

## 2017-02-21 PROCEDURE — 25000003 PHARM REV CODE 250: Performed by: SURGERY

## 2017-02-21 PROCEDURE — 63600175 PHARM REV CODE 636 W HCPCS: Performed by: ANESTHESIOLOGY

## 2017-02-21 PROCEDURE — 71000015 HC POSTOP RECOV 1ST HR: Performed by: SURGERY

## 2017-02-21 PROCEDURE — 71000044 HC DOSC ROUTINE RECOVERY FIRST HOUR: Performed by: SURGERY

## 2017-02-21 PROCEDURE — 36000707: Performed by: SURGERY

## 2017-02-21 PROCEDURE — 37000009 HC ANESTHESIA EA ADD 15 MINS: Performed by: SURGERY

## 2017-02-21 PROCEDURE — 36000706: Performed by: SURGERY

## 2017-02-21 PROCEDURE — 71000045 HC DOSC ROUTINE RECOVERY EA ADD'L HR: Performed by: SURGERY

## 2017-02-21 PROCEDURE — D9220A PRA ANESTHESIA: Mod: CRNA,,, | Performed by: NURSE ANESTHETIST, CERTIFIED REGISTERED

## 2017-02-21 PROCEDURE — D9220A PRA ANESTHESIA: Mod: ANES,,, | Performed by: ANESTHESIOLOGY

## 2017-02-21 PROCEDURE — 63600175 PHARM REV CODE 636 W HCPCS: Performed by: NURSE ANESTHETIST, CERTIFIED REGISTERED

## 2017-02-21 PROCEDURE — 37000008 HC ANESTHESIA 1ST 15 MINUTES: Performed by: SURGERY

## 2017-02-21 DEVICE — KIT POWERPORT SINGLE 8FR: Type: IMPLANTABLE DEVICE | Site: SUBCLAVIAN | Status: FUNCTIONAL

## 2017-02-21 RX ORDER — ONDANSETRON 2 MG/ML
4 INJECTION INTRAMUSCULAR; INTRAVENOUS ONCE
Status: COMPLETED | OUTPATIENT
Start: 2017-02-21 | End: 2017-02-21

## 2017-02-21 RX ORDER — FENTANYL CITRATE 50 UG/ML
INJECTION, SOLUTION INTRAMUSCULAR; INTRAVENOUS
Status: DISCONTINUED | OUTPATIENT
Start: 2017-02-21 | End: 2017-02-21

## 2017-02-21 RX ORDER — OXYCODONE HYDROCHLORIDE 5 MG/1
5 TABLET ORAL EVERY 4 HOURS PRN
Status: DISCONTINUED | OUTPATIENT
Start: 2017-02-21 | End: 2017-02-21 | Stop reason: HOSPADM

## 2017-02-21 RX ORDER — SODIUM CHLORIDE 9 MG/ML
INJECTION, SOLUTION INTRAVENOUS ONCE
Status: COMPLETED | OUTPATIENT
Start: 2017-02-21 | End: 2017-02-21

## 2017-02-21 RX ORDER — ONDANSETRON 2 MG/ML
INJECTION INTRAMUSCULAR; INTRAVENOUS
Status: DISCONTINUED | OUTPATIENT
Start: 2017-02-21 | End: 2017-02-21

## 2017-02-21 RX ORDER — PROPOFOL 10 MG/ML
VIAL (ML) INTRAVENOUS
Status: DISCONTINUED | OUTPATIENT
Start: 2017-02-21 | End: 2017-02-21

## 2017-02-21 RX ORDER — PHENYLEPHRINE HYDROCHLORIDE 10 MG/ML
INJECTION INTRAVENOUS
Status: DISCONTINUED | OUTPATIENT
Start: 2017-02-21 | End: 2017-02-21

## 2017-02-21 RX ORDER — ONDANSETRON 2 MG/ML
4 INJECTION INTRAMUSCULAR; INTRAVENOUS EVERY 12 HOURS PRN
Status: DISCONTINUED | OUTPATIENT
Start: 2017-02-21 | End: 2017-02-21 | Stop reason: HOSPADM

## 2017-02-21 RX ORDER — PROPOFOL 10 MG/ML
VIAL (ML) INTRAVENOUS CONTINUOUS PRN
Status: DISCONTINUED | OUTPATIENT
Start: 2017-02-21 | End: 2017-02-21

## 2017-02-21 RX ORDER — ALPRAZOLAM 0.5 MG/1
0.5 TABLET ORAL 2 TIMES DAILY
Qty: 60 TABLET | Refills: 0 | Status: SHIPPED | OUTPATIENT
Start: 2017-02-21 | End: 2017-03-23 | Stop reason: SDUPTHER

## 2017-02-21 RX ORDER — FENTANYL CITRATE 50 UG/ML
25 INJECTION, SOLUTION INTRAMUSCULAR; INTRAVENOUS EVERY 5 MIN PRN
Status: COMPLETED | OUTPATIENT
Start: 2017-02-21 | End: 2017-02-21

## 2017-02-21 RX ORDER — HEPARIN SODIUM (PORCINE) LOCK FLUSH IV SOLN 100 UNIT/ML 100 UNIT/ML
SOLUTION INTRAVENOUS
Status: DISCONTINUED | OUTPATIENT
Start: 2017-02-21 | End: 2017-02-21 | Stop reason: HOSPADM

## 2017-02-21 RX ORDER — SODIUM CHLORIDE 9 MG/ML
INJECTION, SOLUTION INTRAVENOUS CONTINUOUS PRN
Status: DISCONTINUED | OUTPATIENT
Start: 2017-02-21 | End: 2017-02-21

## 2017-02-21 RX ORDER — BUPIVACAINE HYDROCHLORIDE 2.5 MG/ML
INJECTION, SOLUTION EPIDURAL; INFILTRATION; INTRACAUDAL
Status: DISCONTINUED | OUTPATIENT
Start: 2017-02-21 | End: 2017-02-21 | Stop reason: HOSPADM

## 2017-02-21 RX ORDER — LIDOCAINE HCL/PF 100 MG/5ML
SYRINGE (ML) INTRAVENOUS
Status: DISCONTINUED | OUTPATIENT
Start: 2017-02-21 | End: 2017-02-21

## 2017-02-21 RX ORDER — OXYCODONE HYDROCHLORIDE 5 MG/1
10 TABLET ORAL EVERY 4 HOURS PRN
Status: DISCONTINUED | OUTPATIENT
Start: 2017-02-21 | End: 2017-02-21 | Stop reason: HOSPADM

## 2017-02-21 RX ORDER — MIDAZOLAM HYDROCHLORIDE 1 MG/ML
INJECTION, SOLUTION INTRAMUSCULAR; INTRAVENOUS
Status: DISCONTINUED | OUTPATIENT
Start: 2017-02-21 | End: 2017-02-21

## 2017-02-21 RX ORDER — OXYCODONE AND ACETAMINOPHEN 5; 325 MG/1; MG/1
TABLET ORAL
Qty: 5 TABLET | Refills: 0 | Status: SHIPPED | OUTPATIENT
Start: 2017-02-21 | End: 2017-02-22 | Stop reason: SDUPTHER

## 2017-02-21 RX ORDER — LIDOCAINE HYDROCHLORIDE AND EPINEPHRINE 10; 10 MG/ML; UG/ML
INJECTION, SOLUTION INFILTRATION; PERINEURAL
Status: DISCONTINUED | OUTPATIENT
Start: 2017-02-21 | End: 2017-02-21 | Stop reason: HOSPADM

## 2017-02-21 RX ORDER — LIDOCAINE HYDROCHLORIDE 10 MG/ML
1 INJECTION, SOLUTION EPIDURAL; INFILTRATION; INTRACAUDAL; PERINEURAL ONCE
Status: DISCONTINUED | OUTPATIENT
Start: 2017-02-21 | End: 2017-02-21 | Stop reason: HOSPADM

## 2017-02-21 RX ADMIN — PROPOFOL 175 MCG/KG/MIN: 10 INJECTION, EMULSION INTRAVENOUS at 07:02

## 2017-02-21 RX ADMIN — PHENYLEPHRINE HYDROCHLORIDE 100 MCG: 10 INJECTION INTRAVENOUS at 07:02

## 2017-02-21 RX ADMIN — PHENYLEPHRINE HYDROCHLORIDE 100 MCG: 10 INJECTION INTRAVENOUS at 08:02

## 2017-02-21 RX ADMIN — SODIUM CHLORIDE: 0.9 INJECTION, SOLUTION INTRAVENOUS at 06:02

## 2017-02-21 RX ADMIN — FENTANYL CITRATE 25 MCG: 50 INJECTION INTRAMUSCULAR; INTRAVENOUS at 10:02

## 2017-02-21 RX ADMIN — MIDAZOLAM HYDROCHLORIDE 2 MG: 1 INJECTION, SOLUTION INTRAMUSCULAR; INTRAVENOUS at 07:02

## 2017-02-21 RX ADMIN — ONDANSETRON 4 MG: 2 INJECTION INTRAMUSCULAR; INTRAVENOUS at 09:02

## 2017-02-21 RX ADMIN — PROPOFOL 50 MG: 10 INJECTION, EMULSION INTRAVENOUS at 07:02

## 2017-02-21 RX ADMIN — PHENYLEPHRINE HYDROCHLORIDE 50 MCG: 10 INJECTION INTRAVENOUS at 07:02

## 2017-02-21 RX ADMIN — FENTANYL CITRATE 25 MCG: 50 INJECTION, SOLUTION INTRAMUSCULAR; INTRAVENOUS at 07:02

## 2017-02-21 RX ADMIN — ONDANSETRON 4 MG: 2 INJECTION INTRAMUSCULAR; INTRAVENOUS at 07:02

## 2017-02-21 RX ADMIN — LIDOCAINE HYDROCHLORIDE 50 MG: 20 INJECTION, SOLUTION INTRAVENOUS at 07:02

## 2017-02-21 RX ADMIN — Medication 2 G: at 07:02

## 2017-02-21 RX ADMIN — OXYCODONE HYDROCHLORIDE 10 MG: 5 TABLET ORAL at 09:02

## 2017-02-21 RX ADMIN — SODIUM CHLORIDE: 0.9 INJECTION, SOLUTION INTRAVENOUS at 10:02

## 2017-02-21 NOTE — TRANSFER OF CARE
Anesthesia Transfer of Care Note    Patient: Ivy Salgado    Procedure(s) Performed: Procedure(s) (LRB):  [id number] (Left)    Patient location: PACU    Anesthesia Type: general    Transport from OR: Transported from OR on room air with adequate spontaneous ventilation    Post pain: adequate analgesia    Post assessment: no apparent anesthetic complications    Post vital signs: stable    Level of consciousness: awake and alert    Nausea/Vomiting: no nausea/vomiting    Complications: none          Last vitals:   Visit Vitals    LMP 03/30/2015    Breastfeeding No

## 2017-02-21 NOTE — DISCHARGE INSTRUCTIONS
Vascular Access Port Implantation   Port implantation is surgery to place (implant) a port under the skin. For vascular access, it is placed into a vein. The port allows medicines or nutrition to be sent right into your bloodstream. Blood can also be taken or given through the port. During the procedure, a long, thin tube called a catheter is threaded into one of your large veins. The tube is then attached to the port. This usually sits under the skin of your chest and causes a small bump. To use the port, a special needle is passed through your skin and into the port. The needle can stay in your skin for up to 7 days, if needed. A port can stay in place for weeks or months or longer.    Why is a vascular access port needed?  A vascular access port may allow healthcare providers to give you:  · Chemotherapy or other cancer-fighting drugs  · IV treatments, such as antibiotics or nutrition  · Hemodialysis (for kidney failure)  The port may also be used to draw blood.  Before the procedure  Follow any instructions you are given on how to prepare.  Tell your provider about any medicines you are taking. This includes:  · All prescription medicines  · Over-the-counter medicines such as aspirin or ibuprofen  · Herbs, vitamins, and other supplements  Also be sure your provider knows:  · If you are pregnant or think you may be pregnant  · If you are allergic to any medicines or substances, especially local anesthetics or iodine  · Your full medical history, including why you will need the port  · If you plan on doing any contact sports  During the procedure  · Before the procedure, an IV may be put into a vein in your arm or hand. This gives you fluids and medicines. You may be given medicine through the IV to help you relax during the procedure. This is called sedation. But some surgeons place ports using general anesthesia.  · The chest is used most often for the port. In some cases, your belly (abdomen) or arm will be  used instead.  · The skin over the insertion area is numbed with local anesthetic.  · Ultrasound or X-rays are used to help the healthcare provider guide the catheter into the proper location during the procedure.  · A cut (incision) is made in the skin where the port will be placed. A small pocket for the port is formed under the skin.  · A second small incision is made in the skin near the first incision. A tunnel under the skin is created. The catheter is put through the tunnel and into the blood vessel.  · The skin is closed over the port. It is held shut with stitches (sutures) or surgical glue or tape. The second small incision is also closed.  · A chest X-ray may be done to make sure the port is placed properly.  After the procedure  You may be taken to a recovery room where youll recover from the sedation. Nurses will check on you as you rest. If you have pain, nurses can give you medicine. If you are not staying in the hospital overnight, you will be sent home a few hours after the procedure is done. A healthcare provider will tell you when you can go home. An adult family member or friend will need to drive you home.  Recovering at home  · Take pain medicine as directed by your healthcare provider.  · Take it easy for 24 hours after the procedure. Avoid physical activity and heavy lifting until your healthcare provider says its OK.  · Keep the port clean and dry. Ask when you can shower again. You will need to keep the port dry by covering it when you shower.  · Care for the insertion site as you are directed.  · Dont swim, bathe, or do other activities that cause water to cover the insertion site.  · To keep the port from getting blocked with blood clots, flush it as often as directed. You should be shown the proper way to flush the port before you go home. It is important to follow these directions.     Risks and possible complications of implantation  · Bleeding  · Infection of the insertion  site  · Damage to a blood vessel  · Nerve injury or irritation  · Collapsed lung (for chest port placements)  · Skin breakdown over the port  Risks and possible complications of having a port  · Blocked  port or catheter  · Leakage or breakage of the port or catheter  · The port moves out of position  · Blood clot  · Skin or bloodstream infection  · Skin breakdown over the port      When to seek medical care  Call your healthcare provider right away if you have any of the following:  · A fever of 100.4°F (38.0°C) or higher  · You can't access or use the port properly  · You can't flush the port or get a blood return  · The skin near the port is red, warm, swollen, or broken  · You have shoulder pain on the side where the port is located  · You feel a heart flutter or racing heart   · Swollen arm, if the port is placed in your arm   Date Last Reviewed: 7/1/2016  © 2128-1602 [employer]. [address] [place] [address], [place] [address]. All rights reserved. This information is not intended as a substitute for professional medical care. Always follow your healthcare professional's instructions.

## 2017-02-21 NOTE — IP AVS SNAPSHOT
[facility]  [address]  [facility] [address]  Phone: [phone]           Patient Discharge Instructions     Our goal is to set you up for success. This packet includes information on your condition, medications, and your home care. It will help you to care for yourself so you don't get sicker and need to go back to the hospital.     Please ask your nurse if you have any questions.        There are many details to remember when preparing to leave the hospital. Here is what you will need to do:    1. Take your medicine. If you are prescribed medications, review your Medication List in the following pages. You may have new medications to  at the pharmacy and others that you'll need to stop taking. Review the instructions for how and when to take your medications. Talk with your doctor or nurses if you are unsure of what to do.     2. Go to your follow-up appointments. Specific follow-up information is listed in the following pages. Your may be contacted by a transition nurse or clinical provider about future appointments. Be sure we have all of the phone numbers to reach you, if needed. Please contact your provider's office if you are unable to make an appointment.     3. Watch for warning signs. Your doctor or nurse will give you detailed warning signs to watch for and when to call for assistance. These instructions may also include educational information about your condition. If you experience any of warning signs to your health, call your doctor.               [facility]  Unless otherwise directed by your provider, please contact [facility], our nurse care line that is available for 24/7 assistance.     [phone] (toll-free)    Registered nurses in the [facility] provide clinical advisement, health education, appointment booking, and other advisory services.                    ** Verify the list of medication(s) below is accurate and up  to date. Carry this with you in case of emergency. If your medications have changed, please notify your healthcare provider.             Medication List      START taking these medications        Additional Info                      oxycodone-acetaminophen 5-325 mg per tablet   Commonly known as:  PERCOCET   Quantity:  5 tablet   Refills:  0    Instructions:  Take one to two tablets by mouth every four hours as needed for pain     Begin Date    AM    Noon    PM    Bedtime         CHANGE how you take these medications        Additional Info                      alprazolam 0.5 MG tablet   Commonly known as:  XANAX   Quantity:  60 tablet   Refills:  0   Dose:  0.5 mg   What changed:  See the new instructions.   Comments:  Not to exceed 5 additional fills before 06/18/2017    Instructions:  Take 1 tablet (0.5 mg total) by mouth 2 (two) times daily.     Begin Date    AM    Noon    PM    Bedtime         CONTINUE taking these medications        Additional Info                      certolizumab pegol 400 mg/2 mL (200 mg/mL x 2) Sykt kit   Commonly known as:  CIMZIA   Quantity:  5 each   Refills:  3   Dose:  400 mg   Comments:  bs    Instructions:  Inject 400 mg (2 mLs total) into the skin every 28 days.     Begin Date    AM    Noon    PM    Bedtime       diphenoxylate-atropine 2.5-0.025 mg 2.5-0.025 mg per tablet   Commonly known as:  LOMOTIL   Quantity:  90 tablet   Refills:  3   Dose:  1 tablet    Instructions:  Take 1 tablet by mouth 3 (three) times daily as needed for Diarrhea.     Begin Date    AM    Noon    PM    Bedtime       hyoscyamine 0.125 mg Subl   Commonly known as:  LEVSIN/SL   Quantity:  60 tablet   Refills:  1   Dose:  0.125 mg    Instructions:  Place 1 tablet (0.125 mg total) under the tongue every 4 (four) hours as needed.     Begin Date    AM    Noon    PM    Bedtime       lisinopril 10 MG tablet   Quantity:  90 tablet   Refills:  3    Instructions:  TAKE 1 TABLET (10 MG TOTAL) BY MOUTH ONCE DAILY.      Begin Date    AM    Noon    PM    Bedtime       norethindrone-ethinyl estradiol 1 mg-20 mcg (21)/75 mg (7) per tablet   Commonly known as:  GILDESS FE 1/20 (28)   Quantity:  28 tablet   Refills:  11   Dose:  1 tablet    Instructions:  Take 1 tablet by mouth once daily. Take one active pill daily continuously.     Begin Date    AM    Noon    PM    Bedtime       ondansetron 8 MG Tbdl   Commonly known as:  ZOFRAN-ODT   Quantity:  30 tablet   Refills:  2   Dose:  8 mg    Instructions:  Take 1 tablet (8 mg total) by mouth every 8 (eight) hours as needed (nausea).     Begin Date    AM    Noon    PM    Bedtime       promethazine 25 MG tablet   Commonly known as:  PHENERGAN   Quantity:  30 tablet   Refills:  3   Dose:  25 mg    Instructions:  Take 1 tablet (25 mg total) by mouth every 6 (six) hours as needed for Nausea.     Begin Date    AM    Noon    PM    Bedtime       sumatriptan 100 MG tablet   Commonly known as:  IMITREX   Quantity:  9 tablet   Refills:  1    Instructions:  TAKE 1 TABLET BY MOUTH AS NEEDED FOR HEADACHE MAY REPEAT ONCE IN 2 HRS MAX 2 DOSES IN 24 HOURS     Begin Date    AM    Noon    PM    Bedtime       valacyclovir 500 MG tablet   Commonly known as:  VALTREX   Quantity:  30 tablet   Refills:  3   Dose:  500 mg    Instructions:  Take 1 tablet (500 mg total) by mouth once daily.     Begin Date    AM    Noon    PM    Bedtime       zolpidem 10 mg Tab   Commonly known as:  AMBIEN   Quantity:  30 tablet   Refills:  0   Comments:  Not to exceed 5 additional fills before 07/16/2017    Instructions:  TAKE 1 TABLET BY MOUTH AT BEDTIME     Begin Date    AM    Noon    PM    Bedtime            Where to Get Your Medications      These medications were sent to [facility]pharm[facility] #[id number] - [place] [facility] - [address]  [address] [address]     Phone:  [phone]     alprazolam 0.5 MG tablet         You can get these medications from any pharmacy     Bring a paper prescription for each of these medications      oxycodone-acetaminophen 5-325 mg per tablet                  Please bring to all follow up appointments:    1. A copy of your discharge instructions.  2. All medicines you are currently taking in their original bottles.  3. Identification and insurance card.    Please arrive 15 minutes ahead of scheduled appointment time.    Please call 24 hours in advance if you must reschedule your appointment and/or time.        Your Scheduled Appointments     Feb 22, 2017  9:30 AM CST   Infusion 060 Min with INFECTIOUS INFUSION, CHAIR 2   [facility] ([facility])    [address]  [place] [address]   [phone]            Mar 06, 2017  2:15 PM CST   Post OP with Parish Sanchez Jr., MD   [facility] - General Surgery ([facility] )    [address]  [place] [id number] [phone]            Mar 07, 2017  8:00 AM CST   Established Patient Visit with Anai Potts OD   [facility] - Optometry ([facility] )    [address] [place] [id number] [phone]            Apr 10, 2017  8:00 AM CDT   GASTROENTEROLOGY ESTABLISHED PATIENT with Parish Ross MD   [facility] Gastroenterology ([facility] )    [address]  [place] [id number] [phone]              Follow-up Information     Call Parish Sanchez Jr, MD.    Specialties:  General Surgery, Bariatrics    Why:  As needed    Contact information:    [address] [place] [address]  [phone]          Discharge Instructions     Future Orders    Activity as tolerated     Call MD for:  difficulty breathing, headache or visual disturbances     Call MD for:  extreme fatigue     Call MD for:  hives     Call MD for:  persistent dizziness or light-headedness     Call MD for:  persistent nausea and vomiting     Call MD for:  redness, tenderness, or signs of infection (pain, swelling, redness, odor or green/yellow discharge around incision site)     Call MD for:  severe  uncontrolled pain     Call MD for:  temperature >100.4     Diet general     Questions:    Total calories:      Fat restriction, if any:      Protein restriction, if any:      Na restriction, if any:      Fluid restriction:      Additional restrictions:      Lifting restrictions     Comments:    No lifting greater than 10 pounds for 2 weeks from day of surgery.  No pushing/pulling such as vacuuming or raking.  No straining, avoid constipation and take stool softeners as described and laxatives as needed.  No driving while on narcotics and until you can react quickly without pain.    Remove dressing in 48 hours     Comments:    Steri strips will fall off on their own.    Shower on day dressing removed (No bath)         Discharge Instructions         Vascular Access Port Implantation   Port implantation is surgery to place (implant) a port under the skin. For vascular access, it is placed into a vein. The port allows medicines or nutrition to be sent right into your bloodstream. Blood can also be taken or given through the port. During the procedure, a long, thin tube called a catheter is threaded into one of your large veins. The tube is then attached to the port. This usually sits under the skin of your chest and causes a small bump. To use the port, a special needle is passed through your skin and into the port. The needle can stay in your skin for up to 7 days, if needed. A port can stay in place for weeks or months or longer.    Why is a vascular access port needed?  A vascular access port may allow healthcare providers to give you:  · Chemotherapy or other cancer-fighting drugs  · IV treatments, such as antibiotics or nutrition  · Hemodialysis (for kidney failure)  The port may also be used to draw blood.  Before the procedure  Follow any instructions you are given on how to prepare.  Tell your provider about any medicines you are taking. This includes:  · All prescription medicines  · Over-the-counter medicines  such as aspirin or ibuprofen  · Herbs, vitamins, and other supplements  Also be sure your provider knows:  · If you are pregnant or think you may be pregnant  · If you are allergic to any medicines or substances, especially local anesthetics or iodine  · Your full medical history, including why you will need the port  · If you plan on doing any contact sports  During the procedure  · Before the procedure, an IV may be put into a vein in your arm or hand. This gives you fluids and medicines. You may be given medicine through the IV to help you relax during the procedure. This is called sedation. But some surgeons place ports using general anesthesia.  · The chest is used most often for the port. In some cases, your belly (abdomen) or arm will be used instead.  · The skin over the insertion area is numbed with local anesthetic.  · Ultrasound or X-rays are used to help the healthcare provider guide the catheter into the proper location during the procedure.  · A cut (incision) is made in the skin where the port will be placed. A small pocket for the port is formed under the skin.  · A second small incision is made in the skin near the first incision. A tunnel under the skin is created. The catheter is put through the tunnel and into the blood vessel.  · The skin is closed over the port. It is held shut with stitches (sutures) or surgical glue or tape. The second small incision is also closed.  · A chest X-ray may be done to make sure the port is placed properly.  After the procedure  You may be taken to a recovery room where youll recover from the sedation. Nurses will check on you as you rest. If you have pain, nurses can give you medicine. If you are not staying in the hospital overnight, you will be sent home a few hours after the procedure is done. A healthcare provider will tell you when you can go home. An adult family member or friend will need to drive you home.  Recovering at home  · Take pain medicine as  directed by your healthcare provider.  · Take it easy for 24 hours after the procedure. Avoid physical activity and heavy lifting until your healthcare provider says its OK.  · Keep the port clean and dry. Ask when you can shower again. You will need to keep the port dry by covering it when you shower.  · Care for the insertion site as you are directed.  · Dont swim, bathe, or do other activities that cause water to cover the insertion site.  · To keep the port from getting blocked with blood clots, flush it as often as directed. You should be shown the proper way to flush the port before you go home. It is important to follow these directions.     Risks and possible complications of implantation  · Bleeding  · Infection of the insertion site  · Damage to a blood vessel  · Nerve injury or irritation  · Collapsed lung (for chest port placements)  · Skin breakdown over the port  Risks and possible complications of having a port  · Blocked  port or catheter  · Leakage or breakage of the port or catheter  · The port moves out of position  · Blood clot  · Skin or bloodstream infection  · Skin breakdown over the port      When to seek medical care  Call your healthcare provider right away if you have any of the following:  · A fever of 100.4°F (38.0°C) or higher  · You can't access or use the port properly  · You can't flush the port or get a blood return  · The skin near the port is red, warm, swollen, or broken  · You have shoulder pain on the side where the port is located  · You feel a heart flutter or racing heart   · Swollen arm, if the port is placed in your arm   Date Last Reviewed: 7/1/2016  © 5675-8979 [employer]. [address] [place] [address], [place] [address]. All rights reserved. This information is not intended as a substitute for professional medical care. Always follow your healthcare professional's instructions.            Primary Diagnosis     Your primary diagnosis was:  Inflammatory Bowel  Disease (Crohn's Disease)      Admission Information     Date & Time Provider Department CSN    2/21/2017  5:36 AM Parish Sanchez Jr., MD [facility] [id number]      Care Providers     Provider Role Specialty Primary office phone    Parish Sanchez Jr., MD Attending Provider General Surgery [phone]    Parish Sanchez Jr., MD Surgeon  General Surgery [phone]      Your Vitals Were     BP Pulse Temp Resp Last Period SpO2    95/59 85 97.7 °F (36.5 °C) (Temporal) 23 03/30/2015 100%      Recent Lab Values        8/24/2011 5/29/2013                        7:28 AM  1:27 PM          A1C 5.4 5.3                     Allergies as of 2/21/2017        Reactions    Azathioprine Sodium     Other reaction(s): pancreatitis  Other reaction(s): pancreatitis    Methotrexate     Other reaction(s): infection-    Morphine Itching, Other (See Comments)    Other reaction(s): Itching      Advance Directives     An advance directive is a document which, in the event you are no longer able to make decisions for yourself, tells your healthcare team what kind of treatment you do or do not want to receive, or who you would like to make those decisions for you.  If you do not currently have an advance directive, [facility] encourages you to create one.  For more information call:  [phone]), [phone] ([phone]),  or log on to [email or web address].        Language Assistance Services     ATTENTION: Language assistance services are available, free of charge. Please call [phone].      ATENCIÓN: Si habla español, tiene a joy disposición servicios gratuitos de asistencia lingüística. Llame al [phone].     KRISTIN Ý: N?u b?n nói Ti?ng Vi?t, có các d?ch v? h? tr? ngôn ng? mi?n phí dành cho b?n. G?i s? [phone].         [facility] complies with applicable Federal civil rights laws and does not discriminate on the basis of race, color, national origin, age,  disability, or sex.

## 2017-02-21 NOTE — PLAN OF CARE
Problem: Fall Risk (Adult)  Goal: Identify Related Risk Factors and Signs and Symptoms  Related risk factors and signs and symptoms are identified upon initiation of Human [employer] Clinical Practice Guideline (CPG)   Outcome: Outcome(s) achieved Date Met:  02/21/17  Bed in low position, call light within reach, patient visualized from nurses station.

## 2017-02-21 NOTE — BRIEF OP NOTE
[facility]  Brief Operative Note     SUMMARY     Surgery Date: 2/21/2017     Surgeon(s) and Role:     * Carin Hill MD - Resident - Assisting     * Parish Sanchez Jr., MD - Primary        Pre-op Diagnosis:  Crohn's disease of small intestine with complication [K50.019]    Post-op Diagnosis:  Post-Op Diagnosis Codes:     * Crohn's disease of small intestine with complication [K50.019]    Procedure(s) (LRB):  [id number]-CATH (Left)    Anesthesia: Local MAC    Description of the findings of the procedure: Left subclavian vein port, second stick    Findings/Key Components: as above    Estimated Blood Loss: * No values recorded between 2/21/2017  7:57 AM and 2/21/2017  8:33 AM *         Specimens:   Specimen     None          Discharge Note    SUMMARY     Admit Date: 2/21/2017    Discharge Date and Time:  02/21/2017 8:33 AM    Hospital Course (synopsis of major diagnoses, care, treatment, and services provided during the course of the hospital stay): Pt tolerated procedure well and had an uncomplicated post op course.       Final Diagnosis: Post-Op Diagnosis Codes:     * Crohn's disease of small intestine with complication [K50.019]    Disposition: Home or Self Care    Follow Up/Patient Instructions:     Medications:  Reconciled Home Medications:   Current Discharge Medication List      START taking these medications    Details   oxycodone-acetaminophen (PERCOCET) 5-325 mg per tablet Take one to two tablets by mouth every four hours as needed for pain  Qty: 5 tablet, Refills: 0         CONTINUE these medications which have NOT CHANGED    Details   alprazolam (XANAX) 0.5 MG tablet Take 1 tablet (0.5 mg total) by mouth 2 (two) times daily.  Qty: 60 tablet, Refills: 0    Comments: Not to exceed 5 additional fills before 06/18/2017      certolizumab pegol (CIMZIA) 400 mg/2 mL (200 mg/mL x 2) SyKt kit Inject 400 mg (2 mLs total) into the skin every 28 days.  Qty: 5 each, Refills: 3     Comments: bs      diphenoxylate-atropine 2.5-0.025 mg (LOMOTIL) 2.5-0.025 mg per tablet Take 1 tablet by mouth 3 (three) times daily as needed for Diarrhea.  Qty: 90 tablet, Refills: 3      hyoscyamine (LEVSIN/SL) 0.125 mg Subl Place 1 tablet (0.125 mg total) under the tongue every 4 (four) hours as needed.  Qty: 60 tablet, Refills: 1      lisinopril 10 MG tablet TAKE 1 TABLET (10 MG TOTAL) BY MOUTH ONCE DAILY.  Qty: 90 tablet, Refills: 3      norethindrone-ethinyl estradiol (GILDESS FE 1/20, 28,) 1 mg-20 mcg (21)/75 mg (7) per tablet Take 1 tablet by mouth once daily. Take one active pill daily continuously.  Qty: 28 tablet, Refills: 11      ondansetron (ZOFRAN-ODT) 8 MG TbDL Take 1 tablet (8 mg total) by mouth every 8 (eight) hours as needed (nausea).  Qty: 30 tablet, Refills: 2      promethazine (PHENERGAN) 25 MG tablet Take 1 tablet (25 mg total) by mouth every 6 (six) hours as needed for Nausea.  Qty: 30 tablet, Refills: 3      sumatriptan (IMITREX) 100 MG tablet TAKE 1 TABLET BY MOUTH AS NEEDED FOR HEADACHE MAY REPEAT ONCE IN 2 HRS MAX 2 DOSES IN 24 HOURS  Qty: 9 tablet, Refills: 1      valacyclovir (VALTREX) 500 MG tablet Take 1 tablet (500 mg total) by mouth once daily.  Qty: 30 tablet, Refills: 3      zolpidem (AMBIEN) 10 mg Tab TAKE 1 TABLET BY MOUTH AT BEDTIME  Qty: 30 tablet, Refills: 0    Comments: Not to exceed 5 additional fills before 07/16/2017             Discharge Procedure Orders  Diet general     Activity as tolerated     Shower on day dressing removed (No bath)     Lifting restrictions   Order Comments: No lifting greater than 10 pounds for 2 weeks from day of surgery.  No pushing/pulling such as vacuuming or raking.  No straining, avoid constipation and take stool softeners as described and laxatives as needed.  No driving while on narcotics and until you can react quickly without pain.     Call MD for:  temperature >100.4     Call MD for:  persistent nausea and vomiting     Call MD for:   severe uncontrolled pain     Call MD for:  difficulty breathing, headache or visual disturbances     Call MD for:  redness, tenderness, or signs of infection (pain, swelling, redness, odor or green/yellow discharge around incision site)     Call MD for:  hives     Call MD for:  persistent dizziness or light-headedness     Call MD for:  extreme fatigue     Remove dressing in 48 hours   Order Comments: Steri strips will fall off on their own.       Follow-up Information     Call Parish Sanchez Jr, MD.    Specialties:  General Surgery, Bariatrics    Why:  As needed    Contact information:    Danielle Roman  [facility] [id number] [phone]

## 2017-02-21 NOTE — PLAN OF CARE
Problem: Patient Care Overview  Goal: Plan of Care Review  Outcome: Outcome(s) achieved Date Met:  02/21/17    Vitals stable, no complaints from patient.Consents in chart. Patient verbalized understanding discharge instructions.

## 2017-02-21 NOTE — ANESTHESIA POSTPROCEDURE EVALUATION
Anesthesia Post Evaluation    Patient: Ivy Salgado    Procedure(s) Performed: Procedure(s) (LRB):  [id number] (Left)    Final Anesthesia Type: general  Patient location during evaluation: [facility]  Patient participation: Yes- Able to Participate  Level of consciousness: awake and alert  Post-procedure vital signs: reviewed and stable  Pain management: adequate  Airway patency: patent  PONV status at discharge: No PONV  Anesthetic complications: no      Cardiovascular status: blood pressure returned to baseline  Respiratory status: unassisted  Hydration status: euvolemic  Follow-up not needed.        Visit Vitals    BP (!) 97/57    Pulse 87    Temp 36.5 °C (97.7 °F) (Temporal)    Resp 18    LMP 03/30/2015    SpO2 100%    Breastfeeding No       Pain/Michael Score: Pain Assessment Performed: Yes (2/21/2017  8:38 AM)  Presence of Pain: non-verbal indicators absent (2/21/2017  8:38 AM)  Pain Rating Prior to Med Admin: 7 (2/21/2017  9:13 AM)  Michael Score: 9 (2/21/2017  8:38 AM)

## 2017-02-21 NOTE — INTERVAL H&P NOTE
The patient has been examined and the H&P has been reviewed:    I concur with the findings and no changes have occurred since H&P was written.    Anesthesia/Surgery risks, benefits and alternative options discussed and understood by patient/family.          Active Hospital Problems    Diagnosis  POA    Crohn's disease of small intestine with complication [K50.019]  Yes      Resolved Hospital Problems    Diagnosis Date Resolved POA   No resolved problems to display.

## 2017-02-21 NOTE — ANESTHESIA RELEASE NOTE
Anesthesia Release from PACU Note    Patient: Ivy Salgado    Procedure(s) Performed: Procedure(s) (LRB):  [id number]-CATH (Left)    Anesthesia type: general    Post pain: Adequate analgesia    Post assessment: no apparent anesthetic complications    Last Vitals:   Visit Vitals    BP (!) 97/57    Pulse 87    Temp 36.5 °C (97.7 °F) (Temporal)    Resp 18    LMP 03/30/2015    SpO2 100%    Breastfeeding No       Post vital signs: stable    Level of consciousness: awake    Nausea/Vomiting: no nausea/no vomiting    Complications: none    Airway Patency: patent    Respiratory: unassisted    Cardiovascular: stable    Hydration: euvolemic

## 2017-02-21 NOTE — ANESTHESIA PREPROCEDURE EVALUATION
Past Medical History   Diagnosis Date    Abnormal Pap smear 2007    Abnormal Pap smear 5/26/2011    Anxiety     C. difficile diarrhea     Crohn's disease     Encounter for blood transfusion     Genital HSV     History of colposcopy with cervical biopsy 2007 and 7/2011 2007-LYLA I  and 7/2011- LYLA I    Hypertension     Kidney stone     Recurrent UTI 4/3/2013    S/P ileostomy 7/9/2012    Sterilization 6/23/2012     Past Surgical History   Procedure Laterality Date    Total colectomy      Ileostomy      Colon surgery      Upper gastrointestinal endoscopy      Small intestine surgery      Colonoscopy      Tubal ligation  06 06 2012    Ckc      Appendectomy      Bladder surgery       partial cystectomy due to fistula    Skin biopsy      Abdominal surgery      Hysterectomy  04/16/2015     SHELLIE    Oophorectomy Right 04/16/2015     Patient Active Problem List   Diagnosis    Other and unspecified ovarian cyst    S/P ileostomy    Regional enteritis of small intestine with large intestine    Crohn's disease    Generalized anxiety disorder    C. difficile colitis    Herpes genitalis in women    Hypomagnesemia    Ovarian cyst    Frequent menstruation    Adnexal adhesions    Adhesions of uterus    Cyst of right ovary    Other unknown and unspecified cause of morbidity or mortality    Small bowel obstruction due to adhesions    Crohn's disease of large intestine with rectal bleeding    Joint pain    Joint swelling    Fatigue    Essential hypertension, benign    Abdominal pain    Nausea    Bruising    Crohn's disease of small intestine with complication     Vitals - 1 value per visit 10/11/2016 10/12/2016 11/2/2016 11/6/2016   SYSTOLIC 139   130   DIASTOLIC 92   81   PULSE 90   105     Vitals - 1 value per visit 11/10/2016 11/30/2016 1/9/2017 1/10/2017   SYSTOLIC 127 126 133    DIASTOLIC 88 86 93    PULSE 100 86 90      Vitals - 1 value per visit 1/25/2017 2/1/2017 2/8/2017  2/10/2017   SYSTOLIC  139  132   DIASTOLIC  87  92   PULSE  102           EKG  Normal sinus rhythm  Normal ECG  When compared with ECG of 18-JUN-2011 13:57,  Vent. rate has decreased BY  57 BPM  Confirmed by Josefina Koenig MD ([id number]) on 1/2/2015 10:42:08 AM    Referred By: LOGAN CANDELARIO           Confirmed By:Josefina Koenig MD                                                                                                         02/21/2017  Ivy Salgado is a 36 y.o., female.    [facility] Anesthesia Evaluation    I have reviewed the Patient Summary Reports.    I have reviewed the Nursing Notes.   I have reviewed the Medications.   Prednisone    Review of Systems  Anesthesia Hx:  No problems with previous Anesthesia  History of prior surgery of interest to airway management or planning: Previous anesthesia: General 4/16/15 SHELLIE  with general anesthesia.  Denies Family Hx of Anesthesia complications.   Denies Personal Hx of Anesthesia complications.   Social:  Non-Smoker    Hematology/Oncology:  Hematology Normal   Oncology Normal     EENT/Dental:EENT/Dental Normal   Cardiovascular:   Exercise tolerance: good Hypertension, well controlled ECG has been reviewed.    Pulmonary:  Pulmonary Normal    Renal/:  Renal/ Normal     Hepatic/GI:   PUD, crohn's   Musculoskeletal:  Musculoskeletal Normal    Neurological:  Neurology Normal    Endocrine:  Endocrine Normal    Dermatological:  Skin Normal    Psych:   Psychiatric History (Anxiety)          Physical Exam  General:  Well nourished    Airway/Jaw/Neck:  Airway Findings: Mouth Opening: Normal Tongue: Normal  General Airway Assessment: Adult  Mallampati: I  TM Distance: 4 - 6 cm  Jaw/Neck Findings:  Neck ROM: Normal ROM     Eyes/Ears/Nose:  Eyes/Ears/Nose Findings:    Dental:  Dental Findings: In tact   Chest/Lungs:  Chest/Lungs Findings:    Heart/Vascular:  Heart Findings:       Mental Status:  Mental Status Findings:  Cooperative, Alert and Oriented         Anesthesia  Plan  Type of Anesthesia, risks & benefits discussed:  Anesthesia Type:  MAC  Patient's Preference: MAC  Intra-op Monitoring Plan: standard ASA monitors  Intra-op Monitoring Plan Comments:   Post Op Pain Control Plan:   Post Op Pain Control Plan Comments:   Induction:    Beta Blocker:  Patient is not currently on a Beta-Blocker (No further documentation required).       Informed Consent:    ASA Score: 3     Day of Surgery Review of History & Physical:            Ready For Surgery From Anesthesia Perspective.

## 2017-02-22 ENCOUNTER — PATIENT MESSAGE (OUTPATIENT)
Dept: SURGERY | Facility: CLINIC | Age: 37
End: 2017-02-22

## 2017-02-22 ENCOUNTER — INFUSION (OUTPATIENT)
Dept: INFECTIOUS DISEASES | Facility: HOSPITAL | Age: 37
End: 2017-02-22
Attending: INTERNAL MEDICINE
Payer: COMMERCIAL

## 2017-02-22 VITALS — HEIGHT: 61 IN | BODY MASS INDEX: 19.83 KG/M2 | WEIGHT: 105 LBS

## 2017-02-22 DIAGNOSIS — K50.019 CROHN'S DISEASE OF SMALL INTESTINE WITH COMPLICATION: Primary | ICD-10-CM

## 2017-02-22 PROCEDURE — 96360 HYDRATION IV INFUSION INIT: CPT

## 2017-02-22 PROCEDURE — 25000003 PHARM REV CODE 250: Performed by: INTERNAL MEDICINE

## 2017-02-22 RX ORDER — SODIUM CHLORIDE 0.9 % (FLUSH) 0.9 %
10 SYRINGE (ML) INJECTION
Status: CANCELLED | OUTPATIENT
Start: 2017-03-01

## 2017-02-22 RX ORDER — HEPARIN SODIUM (PORCINE) LOCK FLUSH IV SOLN 100 UNIT/ML 100 UNIT/ML
100 SOLUTION INTRAVENOUS
Status: CANCELLED | OUTPATIENT
Start: 2017-03-01

## 2017-02-22 RX ORDER — OXYCODONE AND ACETAMINOPHEN 5; 325 MG/1; MG/1
TABLET ORAL
Qty: 10 TABLET | Refills: 0 | Status: SHIPPED | OUTPATIENT
Start: 2017-02-22 | End: 2017-03-17 | Stop reason: SDUPTHER

## 2017-02-22 RX ORDER — SODIUM CHLORIDE 9 MG/ML
INJECTION, SOLUTION INTRAVENOUS ONCE
Status: CANCELLED
Start: 2017-03-01 | End: 2017-03-01

## 2017-02-22 RX ADMIN — SODIUM CHLORIDE 1000 ML: 0.9 INJECTION, SOLUTION INTRAVENOUS at 10:02

## 2017-02-23 NOTE — OP NOTE
2/23/2017    PRE-OP DIAGNOSIS: Crohn's disease of small intestine with complication [K50.019]      POST-OP DIAGNOSIS: Post-Op Diagnosis Codes:     * Crohn's disease of small intestine with complication [K50.019]    Procedure(s):  [id number]     SURGEON: Surgeon(s) and Role:     * Carin Hill MD - Resident - Assisting     * Parish Sanchez Jr., MD - Primary    ANESTHESIA: Local MAC     ESTIMATED BLOOD LOSS: 10 mL     FINDINGS: A left subclavian vein  port placed with tip at junction of superior   vena cava and right atrium.     SPECIMEN: None.     DRAINS: None.     COMPLICATIONS: None.     INDICATIONS: Ivy Salgado is a 36 y.o.female with Crohn's. General Surgery was consulted for port placement for medication administration. We recommended a left subclavian port and the patient agreed to proceed. The patient did sign informed consent and expressed understanding of the risks and benefits of surgery.     OPERATIVE PROCEDURE: The patient was identified in Preoperative Holding,   brought back to the Operating Room, and placed supine on the operating table   and padded appropriately. Monitors were applied. Monitored anesthesia care   was initiated. The left chest was prepped and draped in the standard sterile   surgical fashion. A timeout was performed and all team members present agreed   this was the correct procedure on the correct patient. We also confirmed   administration of appropriate preoperative antibiotics.     After administration of appropriate local anesthesia, the left subclavian vein was cannulated on the second pass with a hollow-bore needle. A guidewire was placed and confirmed to be in correct position by fluoroscopy. An appropriate area for the pocket was chosen, and the incision was anesthetized with lidocaine. A 4 cm transverse skin incision was made. Subcutaneous tissue was divided with Bovie electrocautery.  The   catheter was measured for length appropriately and cut.  Additional local was   administered for the pocket for the port and then the port pocket was created   with a mixture of Bovie electrocautery and blunt dissection. The port was tunneled from the incision to the cannulation site with the tunneling device. The port was secured to the investing pectoral fascia with two 2-0 Vicryl sutures. Under fluoroscopic guidance, the split sheath and dilator were placed over the guidewire, and the guidewire and dilator were then removed. The catheter was placed down the split sheath and the sheath was split and peeled away. Fluoroscopy confirmed appropriate position of the catheter, which aspirated and flushed easily. Heparinized saline was instilled in the catheter. The skin incision was closed in layers with deep buried interrupted 3-0 Vicryl and  running subcuticular 4-0 Monocryl. The cannulation incision was closed with a buried interrupted 4-0 Monocryl stitch. A sterile dressing was applied. The patient was transported to the Recovery Room in stable condition. All sponge, instrument and needle counts were correct at the end of the procedure. Staff was present and scrubbed for the entire case.

## 2017-03-01 ENCOUNTER — INFUSION (OUTPATIENT)
Dept: INFECTIOUS DISEASES | Facility: HOSPITAL | Age: 37
End: 2017-03-01
Attending: INTERNAL MEDICINE
Payer: COMMERCIAL

## 2017-03-01 ENCOUNTER — PATIENT MESSAGE (OUTPATIENT)
Dept: GASTROENTEROLOGY | Facility: CLINIC | Age: 37
End: 2017-03-01

## 2017-03-01 VITALS
RESPIRATION RATE: 17 BRPM | TEMPERATURE: 98 F | BODY MASS INDEX: 19.83 KG/M2 | HEIGHT: 61 IN | OXYGEN SATURATION: 96 % | WEIGHT: 105 LBS | HEART RATE: 90 BPM | SYSTOLIC BLOOD PRESSURE: 129 MMHG | DIASTOLIC BLOOD PRESSURE: 86 MMHG

## 2017-03-01 DIAGNOSIS — K50.019 CROHN'S DISEASE OF SMALL INTESTINE WITH COMPLICATION: Primary | ICD-10-CM

## 2017-03-01 DIAGNOSIS — Z43.2 ATTENTION TO ILEOSTOMY: Primary | ICD-10-CM

## 2017-03-01 DIAGNOSIS — M25.559 ARTHRALGIA OF HIP, UNSPECIFIED LATERALITY: Primary | ICD-10-CM

## 2017-03-01 PROCEDURE — 96360 HYDRATION IV INFUSION INIT: CPT

## 2017-03-01 PROCEDURE — 25000003 PHARM REV CODE 250: Performed by: INTERNAL MEDICINE

## 2017-03-01 RX ORDER — HEPARIN SODIUM (PORCINE) LOCK FLUSH IV SOLN 100 UNIT/ML 100 UNIT/ML
100 SOLUTION INTRAVENOUS
Status: CANCELLED | OUTPATIENT
Start: 2017-03-08

## 2017-03-01 RX ORDER — SODIUM CHLORIDE 9 MG/ML
INJECTION, SOLUTION INTRAVENOUS ONCE
Status: CANCELLED
Start: 2017-03-08 | End: 2017-03-08

## 2017-03-01 RX ORDER — SODIUM CHLORIDE 0.9 % (FLUSH) 0.9 %
10 SYRINGE (ML) INJECTION
Status: CANCELLED | OUTPATIENT
Start: 2017-03-08

## 2017-03-01 RX ADMIN — SODIUM CHLORIDE 1000 ML: 0.9 INJECTION, SOLUTION INTRAVENOUS at 08:03

## 2017-03-07 ENCOUNTER — PATIENT MESSAGE (OUTPATIENT)
Dept: GASTROENTEROLOGY | Facility: CLINIC | Age: 37
End: 2017-03-07

## 2017-03-07 ENCOUNTER — HOSPITAL ENCOUNTER (OUTPATIENT)
Dept: RADIOLOGY | Facility: HOSPITAL | Age: 37
Discharge: HOME | End: 2017-03-07
Attending: INTERNAL MEDICINE
Payer: COMMERCIAL

## 2017-03-07 DIAGNOSIS — M25.559 ARTHRALGIA OF HIP, UNSPECIFIED LATERALITY: ICD-10-CM

## 2017-03-07 PROCEDURE — 72190 X-RAY EXAM OF PELVIS: CPT | Mod: TC

## 2017-03-07 PROCEDURE — 72220 X-RAY EXAM SACRUM TAILBONE: CPT | Mod: TC

## 2017-03-07 PROCEDURE — 72220 X-RAY EXAM SACRUM TAILBONE: CPT | Mod: 26,,, | Performed by: RADIOLOGY

## 2017-03-07 PROCEDURE — 72190 X-RAY EXAM OF PELVIS: CPT | Mod: 26,,, | Performed by: RADIOLOGY

## 2017-03-09 ENCOUNTER — INFUSION (OUTPATIENT)
Dept: INFECTIOUS DISEASES | Facility: HOSPITAL | Age: 37
End: 2017-03-09
Attending: INTERNAL MEDICINE
Payer: COMMERCIAL

## 2017-03-09 VITALS
SYSTOLIC BLOOD PRESSURE: 105 MMHG | TEMPERATURE: 99 F | HEIGHT: 61 IN | HEART RATE: 67 BPM | RESPIRATION RATE: 18 BRPM | DIASTOLIC BLOOD PRESSURE: 73 MMHG

## 2017-03-09 DIAGNOSIS — K50.019 CROHN'S DISEASE OF SMALL INTESTINE WITH COMPLICATION: Primary | ICD-10-CM

## 2017-03-09 PROCEDURE — 25000003 PHARM REV CODE 250: Performed by: INTERNAL MEDICINE

## 2017-03-09 PROCEDURE — 96360 HYDRATION IV INFUSION INIT: CPT

## 2017-03-09 RX ORDER — SODIUM CHLORIDE 9 MG/ML
INJECTION, SOLUTION INTRAVENOUS ONCE
Status: DISCONTINUED | OUTPATIENT
Start: 2017-03-09 | End: 2017-03-09 | Stop reason: HOSPADM

## 2017-03-09 RX ORDER — HEPARIN SODIUM (PORCINE) LOCK FLUSH IV SOLN 100 UNIT/ML 100 UNIT/ML
100 SOLUTION INTRAVENOUS
Status: CANCELLED | OUTPATIENT
Start: 2017-03-15

## 2017-03-09 RX ORDER — SODIUM CHLORIDE 0.9 % (FLUSH) 0.9 %
10 SYRINGE (ML) INJECTION
Status: CANCELLED | OUTPATIENT
Start: 2017-03-15

## 2017-03-09 RX ORDER — SODIUM CHLORIDE 9 MG/ML
INJECTION, SOLUTION INTRAVENOUS ONCE
Status: CANCELLED
Start: 2017-03-15 | End: 2017-03-15

## 2017-03-09 RX ADMIN — SODIUM CHLORIDE 1000 ML: 0.9 INJECTION, SOLUTION INTRAVENOUS at 09:03

## 2017-03-15 ENCOUNTER — INFUSION (OUTPATIENT)
Dept: INFECTIOUS DISEASES | Facility: HOSPITAL | Age: 37
End: 2017-03-15
Attending: INTERNAL MEDICINE
Payer: COMMERCIAL

## 2017-03-15 VITALS
TEMPERATURE: 99 F | HEIGHT: 61 IN | SYSTOLIC BLOOD PRESSURE: 108 MMHG | HEART RATE: 75 BPM | DIASTOLIC BLOOD PRESSURE: 79 MMHG | RESPIRATION RATE: 18 BRPM | WEIGHT: 105 LBS | BODY MASS INDEX: 19.83 KG/M2

## 2017-03-15 DIAGNOSIS — K50.019 CROHN'S DISEASE OF SMALL INTESTINE WITH COMPLICATION: Primary | ICD-10-CM

## 2017-03-15 PROCEDURE — 96360 HYDRATION IV INFUSION INIT: CPT

## 2017-03-15 PROCEDURE — 25000003 PHARM REV CODE 250: Performed by: INTERNAL MEDICINE

## 2017-03-15 RX ORDER — SODIUM CHLORIDE 9 MG/ML
INJECTION, SOLUTION INTRAVENOUS ONCE
Status: CANCELLED
Start: 2017-03-22 | End: 2017-03-22

## 2017-03-15 RX ORDER — SODIUM CHLORIDE 0.9 % (FLUSH) 0.9 %
10 SYRINGE (ML) INJECTION
Status: CANCELLED | OUTPATIENT
Start: 2017-03-22

## 2017-03-15 RX ORDER — HEPARIN SODIUM (PORCINE) LOCK FLUSH IV SOLN 100 UNIT/ML 100 UNIT/ML
100 SOLUTION INTRAVENOUS
Status: CANCELLED | OUTPATIENT
Start: 2017-03-22

## 2017-03-15 RX ADMIN — SODIUM CHLORIDE 1000 ML: 0.9 INJECTION, SOLUTION INTRAVENOUS at 09:03

## 2017-03-17 ENCOUNTER — PATIENT MESSAGE (OUTPATIENT)
Dept: OBSTETRICS AND GYNECOLOGY | Facility: CLINIC | Age: 37
End: 2017-03-17

## 2017-03-17 ENCOUNTER — PATIENT MESSAGE (OUTPATIENT)
Dept: GASTROENTEROLOGY | Facility: CLINIC | Age: 37
End: 2017-03-17

## 2017-03-17 ENCOUNTER — LAB VISIT (OUTPATIENT)
Dept: LAB | Facility: HOSPITAL | Age: 37
End: 2017-03-17
Attending: OBSTETRICS & GYNECOLOGY
Payer: COMMERCIAL

## 2017-03-17 DIAGNOSIS — K50.80 CROHN'S DISEASE OF BOTH SMALL AND LARGE INTESTINE WITHOUT COMPLICATION: Primary | ICD-10-CM

## 2017-03-17 DIAGNOSIS — Z11.3 SCREEN FOR STD (SEXUALLY TRANSMITTED DISEASE): Primary | ICD-10-CM

## 2017-03-17 DIAGNOSIS — Z11.3 SCREEN FOR STD (SEXUALLY TRANSMITTED DISEASE): ICD-10-CM

## 2017-03-17 PROCEDURE — 87591 N.GONORRHOEAE DNA AMP PROB: CPT

## 2017-03-17 RX ORDER — OXYCODONE AND ACETAMINOPHEN 5; 325 MG/1; MG/1
TABLET ORAL
Qty: 30 TABLET | Refills: 0 | Status: SHIPPED | OUTPATIENT
Start: 2017-03-17 | End: 2017-04-22

## 2017-03-17 RX ORDER — ZOLPIDEM TARTRATE 10 MG/1
TABLET ORAL
Qty: 30 TABLET | Refills: 0 | Status: SHIPPED | OUTPATIENT
Start: 2017-03-17 | End: 2017-03-21

## 2017-03-18 LAB
C TRACH DNA SPEC QL NAA+PROBE: NOT DETECTED
N GONORRHOEA DNA SPEC QL NAA+PROBE: NOT DETECTED

## 2017-03-21 RX ORDER — ZOLPIDEM TARTRATE 10 MG/1
TABLET ORAL
Qty: 30 TABLET | Refills: 0 | Status: SHIPPED | OUTPATIENT
Start: 2017-03-21 | End: 2017-04-24 | Stop reason: SDUPTHER

## 2017-03-22 ENCOUNTER — INFUSION (OUTPATIENT)
Dept: INFECTIOUS DISEASES | Facility: HOSPITAL | Age: 37
End: 2017-03-22
Attending: INTERNAL MEDICINE
Payer: COMMERCIAL

## 2017-03-22 VITALS
OXYGEN SATURATION: 95 % | RESPIRATION RATE: 19 BRPM | BODY MASS INDEX: 19.84 KG/M2 | TEMPERATURE: 99 F | DIASTOLIC BLOOD PRESSURE: 81 MMHG | SYSTOLIC BLOOD PRESSURE: 120 MMHG | HEART RATE: 74 BPM | WEIGHT: 105 LBS

## 2017-03-22 DIAGNOSIS — K50.019 CROHN'S DISEASE OF SMALL INTESTINE WITH COMPLICATION: Primary | ICD-10-CM

## 2017-03-22 PROCEDURE — 96360 HYDRATION IV INFUSION INIT: CPT

## 2017-03-22 PROCEDURE — 25000003 PHARM REV CODE 250: Performed by: INTERNAL MEDICINE

## 2017-03-22 RX ORDER — HEPARIN SODIUM (PORCINE) LOCK FLUSH IV SOLN 100 UNIT/ML 100 UNIT/ML
100 SOLUTION INTRAVENOUS
Status: CANCELLED | OUTPATIENT
Start: 2017-03-29

## 2017-03-22 RX ORDER — SODIUM CHLORIDE 0.9 % (FLUSH) 0.9 %
10 SYRINGE (ML) INJECTION
Status: CANCELLED | OUTPATIENT
Start: 2017-03-29

## 2017-03-22 RX ORDER — SODIUM CHLORIDE 9 MG/ML
INJECTION, SOLUTION INTRAVENOUS ONCE
Status: CANCELLED
Start: 2017-03-29 | End: 2017-03-29

## 2017-03-22 RX ADMIN — SODIUM CHLORIDE 1000 ML: 0.9 INJECTION, SOLUTION INTRAVENOUS at 09:03

## 2017-03-22 NOTE — PROGRESS NOTES
3/22/2017@1019   Pt tolerated normal saline bolus without incidence, locked with Heparin flush 500 Units/5ml, pt deaccessed, no bleeding noted t site upon removal, band aid in palce

## 2017-03-22 NOTE — PROGRESS NOTES
3/22/17@0911 pt's left subclavian medi port accessed without incidence on first stick, normal saline bolus in progress at this time

## 2017-03-23 RX ORDER — ALPRAZOLAM 0.5 MG/1
0.5 TABLET ORAL 2 TIMES DAILY
Qty: 60 TABLET | Refills: 0 | Status: SHIPPED | OUTPATIENT
Start: 2017-03-23 | End: 2017-04-24 | Stop reason: SDUPTHER

## 2017-03-27 ENCOUNTER — OFFICE VISIT (OUTPATIENT)
Dept: OTOLARYNGOLOGY | Facility: CLINIC | Age: 37
End: 2017-03-27
Payer: COMMERCIAL

## 2017-03-27 ENCOUNTER — CLINICAL SUPPORT (OUTPATIENT)
Dept: AUDIOLOGY | Facility: CLINIC | Age: 37
End: 2017-03-27
Payer: COMMERCIAL

## 2017-03-27 VITALS
BODY MASS INDEX: 19.57 KG/M2 | HEIGHT: 61 IN | WEIGHT: 103.63 LBS | SYSTOLIC BLOOD PRESSURE: 136 MMHG | DIASTOLIC BLOOD PRESSURE: 91 MMHG

## 2017-03-27 DIAGNOSIS — H81.01 ENDOLYMPHATIC HYDROPS, RIGHT: ICD-10-CM

## 2017-03-27 DIAGNOSIS — Z01.10 EXAMINATION OF EARS AND HEARING: Primary | ICD-10-CM

## 2017-03-27 DIAGNOSIS — H93.11 TINNITUS, RIGHT: Primary | ICD-10-CM

## 2017-03-27 PROCEDURE — 99203 OFFICE O/P NEW LOW 30 MIN: CPT | Mod: S$GLB,,, | Performed by: OTOLARYNGOLOGY

## 2017-03-27 PROCEDURE — 92504 EAR MICROSCOPY EXAMINATION: CPT | Mod: S$GLB,,, | Performed by: OTOLARYNGOLOGY

## 2017-03-27 PROCEDURE — 92552 PURE TONE AUDIOMETRY AIR: CPT | Mod: S$GLB,,, | Performed by: PHYSICIAN ASSISTANT

## 2017-03-27 PROCEDURE — 99999 PR PBB SHADOW E&M-EST. PATIENT-LVL III: CPT | Mod: PBBFAC,,, | Performed by: OTOLARYNGOLOGY

## 2017-03-27 PROCEDURE — 92556 SPEECH AUDIOMETRY COMPLETE: CPT | Mod: S$GLB,,, | Performed by: PHYSICIAN ASSISTANT

## 2017-03-27 RX ORDER — PREDNISONE 5 MG/1
TABLET ORAL
Qty: 25 TABLET | Refills: 0 | Status: SHIPPED | OUTPATIENT
Start: 2017-03-27 | End: 2017-04-22

## 2017-03-27 RX ORDER — METRONIDAZOLE 500 MG/1
TABLET ORAL
Refills: 0 | COMMUNITY
Start: 2017-03-15 | End: 2017-06-15 | Stop reason: SDUPTHER

## 2017-03-27 RX ORDER — CEFDINIR 300 MG/1
CAPSULE ORAL
Refills: 0 | COMMUNITY
Start: 2016-12-20 | End: 2017-06-28

## 2017-03-27 NOTE — PROGRESS NOTES
Subjective:       Patient ID: Ivy Salgado is a 36 y.o. female.    Chief Complaint: Tinnitus; Otalgia; and Foreign Body in Ear (left ear )    HPI   36F otherwise well presents with right ear pain, fullness, tinnitus, and possible foreign body x 1.5 weeks. She lost a piece of a tragal piercing and is concerned it is in her EAC. The right ear pain, fullness, and tinnitus all started suddenly last week. She denies any prior symptoms. She tried to clean the ear with a q-tip and it began bleeding. Denies hearing loss and dizziness. No drainage.    Past Medical History: Patient has a past medical history of Abnormal Pap smear (2007); Abnormal Pap smear (5/26/2011); Anxiety; C. difficile diarrhea; Crohn's disease; Encounter for blood transfusion; Genital HSV; History of colposcopy with cervical biopsy (2007 and 7/2011); Hypertension; Kidney stone; Recurrent UTI (4/3/2013); S/P ileostomy (7/9/2012); and Sterilization (6/23/2012).    Past Surgical History: Patient has a past surgical history that includes Total colectomy; Ileostomy; Colon surgery; Upper gastrointestinal endoscopy; Small intestine surgery; Colonoscopy; Tubal ligation (06 06 2012); CKC; Appendectomy; Bladder surgery; Skin biopsy; Abdominal surgery; Hysterectomy (04/16/2015); and Oophorectomy (Right, 04/16/2015).    Social History: Patient reports that she has never smoked. She has never used smokeless tobacco. She reports that she drinks alcohol. She reports that she does not use illicit drugs.    Family History: family history includes Cancer in her father and maternal grandfather; Colon cancer in her father; Crohn's disease in her brother; Endometrial cancer in her maternal aunt; Hypertension in her mother; Skin cancer in her maternal grandfather. There is no history of Breast cancer or Ovarian cancer.    Medications:   Current Outpatient Prescriptions   Medication Sig    alprazolam (XANAX) 0.5 MG tablet Take 1 tablet (0.5 mg total) by mouth 2 (two)  times daily.    cefdinir (OMNICEF) 300 MG capsule TAKE 1 CAPSULE BY MOUTH EVERY 12 HOURS FOR 7 DAYS    certolizumab pegol (CIMZIA) 400 mg/2 mL (200 mg/mL x 2) SyKt kit Inject 400 mg (2 mLs total) into the skin every 28 days.    diphenoxylate-atropine 2.5-0.025 mg (LOMOTIL) 2.5-0.025 mg per tablet Take 1 tablet by mouth 3 (three) times daily as needed for Diarrhea.    hyoscyamine (LEVSIN/SL) 0.125 mg Subl Place 1 tablet (0.125 mg total) under the tongue every 4 (four) hours as needed.    lisinopril 10 MG tablet TAKE 1 TABLET (10 MG TOTAL) BY MOUTH ONCE DAILY.    metronidazole (FLAGYL) 500 MG tablet TAKE 1 TABLET (500 MG TOTAL) BY MOUTH EVERY 12 (TWELVE) HOURS. NO ALCOHOL WHILE TAKING MEDICINE    norethindrone-ethinyl estradiol (GILDESS FE 1/20, 28,) 1 mg-20 mcg (21)/75 mg (7) per tablet Take 1 tablet by mouth once daily. Take one active pill daily continuously.    ondansetron (ZOFRAN-ODT) 8 MG TbDL Take 1 tablet (8 mg total) by mouth every 8 (eight) hours as needed (nausea).    oxycodone-acetaminophen (PERCOCET) 5-325 mg per tablet Take one tablet by mouth every six hours as needed for pain    predniSONE (DELTASONE) 5 MG tablet Take 6 the first day, then 5 the second, 4 the third, then 3, 2 and one.    promethazine (PHENERGAN) 25 MG tablet Take 1 tablet (25 mg total) by mouth every 6 (six) hours as needed for Nausea.    sumatriptan (IMITREX) 100 MG tablet TAKE 1 TABLET BY MOUTH AS NEEDED FOR HEADACHE MAY REPEAT ONCE IN 2 HRS MAX 2 DOSES IN 24 HOURS    valacyclovir (VALTREX) 500 MG tablet Take 1 tablet (500 mg total) by mouth once daily.    zolpidem (AMBIEN) 10 mg Tab TAKE 1 TABLET BY MOUTH AT BEDTIME     No current facility-administered medications for this visit.        Allergies: Patient is allergic to azathioprine sodium; methotrexate; and morphine.    Review of Systems   Constitutional: Negative for fever and unexpected weight change.   HENT: Positive for ear pain and tinnitus. Negative for ear  discharge and hearing loss.    Eyes: Negative for photophobia and visual disturbance.   Respiratory: Negative for cough and shortness of breath.    Cardiovascular: Negative for chest pain and palpitations.   Gastrointestinal: Negative for abdominal pain, nausea and vomiting.   Endocrine: Negative for cold intolerance and heat intolerance.   Genitourinary: Negative for dysuria and flank pain.   Musculoskeletal: Negative for myalgias and neck pain.   Skin: Negative for rash.   Allergic/Immunologic: Negative for immunocompromised state.   Neurological: Negative for dizziness, facial asymmetry, light-headedness and headaches.   Hematological: Negative for adenopathy.   Psychiatric/Behavioral: Negative for behavioral problems.         Objective:      Physical Exam   Constitutional: She is oriented to person, place, and time. She appears well-developed and well-nourished. No distress.   HENT:   Head: Normocephalic and atraumatic.   Right Ear: Hearing, tympanic membrane, external ear and ear canal normal. No foreign bodies. Tympanic membrane is not scarred and not perforated. No middle ear effusion.   Left Ear: Hearing, tympanic membrane, external ear and ear canal normal. No foreign bodies. Tympanic membrane is not scarred and not perforated.  No middle ear effusion.   Ears:    Nose: Nose normal. No nasal deformity or septal deviation.   Mouth/Throat: Uvula is midline, oropharynx is clear and moist and mucous membranes are normal.   Eyes: Conjunctivae and EOM are normal. Pupils are equal, round, and reactive to light.   Neck: Normal range of motion. Neck supple.   Cardiovascular: Regular rhythm and intact distal pulses.    Pulmonary/Chest: Effort normal. No stridor. No respiratory distress.   Musculoskeletal: Normal range of motion.   Lymphadenopathy:     She has no cervical adenopathy.   Neurological: She is oriented to person, place, and time. No cranial nerve deficit. Coordination normal.   Skin: Skin is warm and dry.  No rash noted.   Psychiatric: Her behavior is normal.       Procedure note:    The patient was brought to the minor procedure room and placed in the supine position. The video microscope was brought onto the field. The ear canal, tympanic membrane and other structures were visualized and demonstrated to the patient and family. The patient tolerated the procedure well and there were no complications.    Procedure note:    The patient was brought to the minor procedure room and placed in the supine position. The operating video microscope was brought on to the field and the right ear canal, tympanic membrane and other structures were visualized and shown the the patient and family. The patient tolerated the procedure well and there were no complications.        Assessment:       1. Tinnitus, right    2. Endolymphatic hydrops, right        Plan:       Ivy was seen today for tinnitus, otalgia and foreign body in ear.    Diagnoses and all orders for this visit:    Tinnitus, right  -     Ambulatory referral to Audiology    Endolymphatic hydrops, right    Other orders  -     predniSONE (DELTASONE) 5 MG tablet; Take 6 the first day, then 5 the second, 4 the third, then 3, 2 and one.      Possible R hydrops given fullness and tinnitus. Will treat with trial of PO steroids.    RTC prn.

## 2017-03-27 NOTE — PROGRESS NOTES
Audiological Evaluation:    Test results indicated normal hearing AU with excellent speech discrimination scores.  Recommend:  1.  Otologic evaluation  2.  Annual hearing exams  3.  Noise protection when necessary

## 2017-03-31 ENCOUNTER — INFUSION (OUTPATIENT)
Dept: INFECTIOUS DISEASES | Facility: HOSPITAL | Age: 37
End: 2017-03-31
Attending: INTERNAL MEDICINE
Payer: COMMERCIAL

## 2017-03-31 DIAGNOSIS — K50.019 CROHN'S DISEASE OF SMALL INTESTINE WITH COMPLICATION: Primary | ICD-10-CM

## 2017-03-31 PROCEDURE — 25000003 PHARM REV CODE 250: Performed by: INTERNAL MEDICINE

## 2017-03-31 PROCEDURE — 96360 HYDRATION IV INFUSION INIT: CPT

## 2017-03-31 RX ORDER — SODIUM CHLORIDE 0.9 % (FLUSH) 0.9 %
10 SYRINGE (ML) INJECTION
Status: CANCELLED | OUTPATIENT
Start: 2017-04-05

## 2017-03-31 RX ORDER — HEPARIN SODIUM (PORCINE) LOCK FLUSH IV SOLN 100 UNIT/ML 100 UNIT/ML
100 SOLUTION INTRAVENOUS
Status: CANCELLED | OUTPATIENT
Start: 2017-04-05

## 2017-03-31 RX ORDER — SODIUM CHLORIDE 9 MG/ML
INJECTION, SOLUTION INTRAVENOUS ONCE
Status: CANCELLED
Start: 2017-04-05 | End: 2017-04-05

## 2017-03-31 RX ADMIN — SODIUM CHLORIDE 1000 ML: 0.9 INJECTION, SOLUTION INTRAVENOUS at 01:03

## 2017-03-31 NOTE — PROGRESS NOTES
pt's left subclavian medi port accessed without incidence on first stick, normal saline bolus in progress at this time,  Pt tolerated normal saline bolus without incidence, medi port locked with Heparin flush 500 Units/5ml, pt deaccessed, no bleeding noted to site upon removal, band aid in palce

## 2017-04-06 ENCOUNTER — INFUSION (OUTPATIENT)
Dept: INFECTIOUS DISEASES | Facility: HOSPITAL | Age: 37
End: 2017-04-06
Attending: INTERNAL MEDICINE
Payer: COMMERCIAL

## 2017-04-06 VITALS
SYSTOLIC BLOOD PRESSURE: 116 MMHG | DIASTOLIC BLOOD PRESSURE: 80 MMHG | HEART RATE: 76 BPM | WEIGHT: 103 LBS | BODY MASS INDEX: 19.46 KG/M2 | RESPIRATION RATE: 16 BRPM | OXYGEN SATURATION: 98 % | TEMPERATURE: 99 F

## 2017-04-06 DIAGNOSIS — K50.019 CROHN'S DISEASE OF SMALL INTESTINE WITH COMPLICATION: Primary | ICD-10-CM

## 2017-04-06 PROCEDURE — 25000003 PHARM REV CODE 250: Performed by: INTERNAL MEDICINE

## 2017-04-06 PROCEDURE — 96360 HYDRATION IV INFUSION INIT: CPT

## 2017-04-06 RX ORDER — HEPARIN SODIUM (PORCINE) LOCK FLUSH IV SOLN 100 UNIT/ML 100 UNIT/ML
100 SOLUTION INTRAVENOUS
Status: CANCELLED | OUTPATIENT
Start: 2017-04-12

## 2017-04-06 RX ORDER — SODIUM CHLORIDE 9 MG/ML
INJECTION, SOLUTION INTRAVENOUS ONCE
Status: CANCELLED
Start: 2017-04-12 | End: 2017-04-12

## 2017-04-06 RX ORDER — SODIUM CHLORIDE 0.9 % (FLUSH) 0.9 %
10 SYRINGE (ML) INJECTION
Status: CANCELLED | OUTPATIENT
Start: 2017-04-12

## 2017-04-06 RX ADMIN — SODIUM CHLORIDE 1000 ML: 0.9 INJECTION, SOLUTION INTRAVENOUS at 01:04

## 2017-04-06 NOTE — PROGRESS NOTES
pt's left subclavian medi port accessed without incidence,  Pt tolerated normal saline bolus( one liter) without incidence, medi port locked with Heparin flush 500 Units/5ml, pt deaccessed, no bleeding noted to site upon removal, band aid in palce

## 2017-04-18 ENCOUNTER — PATIENT MESSAGE (OUTPATIENT)
Dept: GASTROENTEROLOGY | Facility: CLINIC | Age: 37
End: 2017-04-18

## 2017-04-18 ENCOUNTER — PATIENT MESSAGE (OUTPATIENT)
Dept: OBSTETRICS AND GYNECOLOGY | Facility: CLINIC | Age: 37
End: 2017-04-18

## 2017-04-18 RX ORDER — LISINOPRIL 10 MG/1
TABLET ORAL
Qty: 90 TABLET | Refills: 2 | Status: SHIPPED | OUTPATIENT
Start: 2017-04-18 | End: 2018-03-01 | Stop reason: SDUPTHER

## 2017-04-18 RX ORDER — ACYCLOVIR 50 MG/G
OINTMENT TOPICAL
Qty: 15 G | Refills: 0 | Status: SHIPPED | OUTPATIENT
Start: 2017-04-18 | End: 2017-06-28

## 2017-04-18 RX ORDER — VALACYCLOVIR HYDROCHLORIDE 500 MG/1
500 TABLET, FILM COATED ORAL DAILY
Qty: 30 TABLET | Refills: 3 | Status: SHIPPED | OUTPATIENT
Start: 2017-04-18 | End: 2017-09-18 | Stop reason: SDUPTHER

## 2017-04-18 NOTE — TELEPHONE ENCOUNTER
Please advise to my chart encounter:    Last GYN exam 10/03/16 (Pap:WNL) ,patient requesting refill on Valacyclovir and suggestions for pain.

## 2017-04-20 ENCOUNTER — INFUSION (OUTPATIENT)
Dept: INFECTIOUS DISEASES | Facility: HOSPITAL | Age: 37
End: 2017-04-20
Attending: INTERNAL MEDICINE
Payer: COMMERCIAL

## 2017-04-20 VITALS
TEMPERATURE: 98 F | WEIGHT: 103 LBS | HEART RATE: 69 BPM | DIASTOLIC BLOOD PRESSURE: 66 MMHG | BODY MASS INDEX: 19.46 KG/M2 | RESPIRATION RATE: 19 BRPM | OXYGEN SATURATION: 98 % | SYSTOLIC BLOOD PRESSURE: 97 MMHG

## 2017-04-20 DIAGNOSIS — K50.019 CROHN'S DISEASE OF SMALL INTESTINE WITH COMPLICATION: Primary | ICD-10-CM

## 2017-04-20 PROCEDURE — 25000003 PHARM REV CODE 250: Performed by: INTERNAL MEDICINE

## 2017-04-20 PROCEDURE — 96360 HYDRATION IV INFUSION INIT: CPT

## 2017-04-20 RX ORDER — HEPARIN SODIUM (PORCINE) LOCK FLUSH IV SOLN 100 UNIT/ML 100 UNIT/ML
100 SOLUTION INTRAVENOUS
Status: CANCELLED | OUTPATIENT
Start: 2017-04-26

## 2017-04-20 RX ORDER — SODIUM CHLORIDE 9 MG/ML
INJECTION, SOLUTION INTRAVENOUS ONCE
Status: CANCELLED
Start: 2017-04-26 | End: 2017-04-26

## 2017-04-20 RX ORDER — SODIUM CHLORIDE 0.9 % (FLUSH) 0.9 %
10 SYRINGE (ML) INJECTION
Status: CANCELLED | OUTPATIENT
Start: 2017-04-26

## 2017-04-20 RX ADMIN — SODIUM CHLORIDE 1000 ML: 0.9 INJECTION, SOLUTION INTRAVENOUS at 08:04

## 2017-04-22 ENCOUNTER — HOSPITAL ENCOUNTER (EMERGENCY)
Facility: HOSPITAL | Age: 37
Discharge: HOME | End: 2017-04-22
Attending: EMERGENCY MEDICINE | Admitting: EMERGENCY MEDICINE
Payer: COMMERCIAL

## 2017-04-22 VITALS
SYSTOLIC BLOOD PRESSURE: 120 MMHG | RESPIRATION RATE: 18 BRPM | HEIGHT: 61 IN | WEIGHT: 105 LBS | OXYGEN SATURATION: 100 % | TEMPERATURE: 98 F | BODY MASS INDEX: 19.83 KG/M2 | HEART RATE: 72 BPM | DIASTOLIC BLOOD PRESSURE: 74 MMHG

## 2017-04-22 DIAGNOSIS — K50.90 CROHN'S DISEASE WITHOUT COMPLICATION, UNSPECIFIED GASTROINTESTINAL TRACT LOCATION: Primary | ICD-10-CM

## 2017-04-22 LAB
ALBUMIN SERPL BCP-MCNC: 3.6 G/DL
ALP SERPL-CCNC: 58 U/L
ALT SERPL W/O P-5'-P-CCNC: 20 U/L
ANION GAP SERPL CALC-SCNC: 10 MMOL/L
AST SERPL-CCNC: 18 U/L
BACTERIA #/AREA URNS AUTO: ABNORMAL /HPF
BASOPHILS # BLD AUTO: 0.02 K/UL
BASOPHILS NFR BLD: 0.2 %
BILIRUB SERPL-MCNC: 0.4 MG/DL
BILIRUB UR QL STRIP: NEGATIVE
BUN SERPL-MCNC: 7 MG/DL
CALCIUM SERPL-MCNC: 8.9 MG/DL
CHLORIDE SERPL-SCNC: 105 MMOL/L
CLARITY UR REFRACT.AUTO: CLEAR
CO2 SERPL-SCNC: 22 MMOL/L
COLOR UR AUTO: YELLOW
CREAT SERPL-MCNC: 0.7 MG/DL
DIFFERENTIAL METHOD: ABNORMAL
EOSINOPHIL # BLD AUTO: 0.1 K/UL
EOSINOPHIL NFR BLD: 0.5 %
ERYTHROCYTE [DISTWIDTH] IN BLOOD BY AUTOMATED COUNT: 14.3 %
EST. GFR  (AFRICAN AMERICAN): >60 ML/MIN/1.73 M^2
EST. GFR  (NON AFRICAN AMERICAN): >60 ML/MIN/1.73 M^2
GLUCOSE SERPL-MCNC: 74 MG/DL
GLUCOSE UR QL STRIP: NEGATIVE
HCT VFR BLD AUTO: 35.1 %
HGB BLD-MCNC: 12.2 G/DL
HGB UR QL STRIP: ABNORMAL
KETONES UR QL STRIP: ABNORMAL
LEUKOCYTE ESTERASE UR QL STRIP: ABNORMAL
LIPASE SERPL-CCNC: 35 U/L
LYMPHOCYTES # BLD AUTO: 2.1 K/UL
LYMPHOCYTES NFR BLD: 22.5 %
MAGNESIUM SERPL-MCNC: 1.9 MG/DL
MCH RBC QN AUTO: 29.9 PG
MCHC RBC AUTO-ENTMCNC: 34.8 %
MCV RBC AUTO: 86 FL
MICROSCOPIC COMMENT: ABNORMAL
MONOCYTES # BLD AUTO: 0.9 K/UL
MONOCYTES NFR BLD: 10.2 %
NEUTROPHILS # BLD AUTO: 6 K/UL
NEUTROPHILS NFR BLD: 66.5 %
NITRITE UR QL STRIP: NEGATIVE
PH UR STRIP: 5 [PH] (ref 5–8)
PLATELET # BLD AUTO: 242 K/UL
PMV BLD AUTO: 9.7 FL
POTASSIUM SERPL-SCNC: 3.2 MMOL/L
PROT SERPL-MCNC: 7.4 G/DL
PROT UR QL STRIP: NEGATIVE
RBC # BLD AUTO: 4.08 M/UL
RBC #/AREA URNS AUTO: 1 /HPF (ref 0–4)
SODIUM SERPL-SCNC: 137 MMOL/L
SP GR UR STRIP: 1.01 (ref 1–1.03)
SQUAMOUS #/AREA URNS AUTO: 1 /HPF
URN SPEC COLLECT METH UR: ABNORMAL
UROBILINOGEN UR STRIP-ACNC: NEGATIVE EU/DL
WBC # BLD AUTO: 9.1 K/UL
WBC #/AREA URNS AUTO: 25 /HPF (ref 0–5)

## 2017-04-22 PROCEDURE — 99284 EMERGENCY DEPT VISIT MOD MDM: CPT | Mod: 25

## 2017-04-22 PROCEDURE — 96365 THER/PROPH/DIAG IV INF INIT: CPT

## 2017-04-22 PROCEDURE — 25000003 PHARM REV CODE 250: Performed by: EMERGENCY MEDICINE

## 2017-04-22 PROCEDURE — 85025 COMPLETE CBC W/AUTO DIFF WBC: CPT

## 2017-04-22 PROCEDURE — 83735 ASSAY OF MAGNESIUM: CPT

## 2017-04-22 PROCEDURE — 25000003 PHARM REV CODE 250

## 2017-04-22 PROCEDURE — 99285 EMERGENCY DEPT VISIT HI MDM: CPT | Mod: ,,, | Performed by: EMERGENCY MEDICINE

## 2017-04-22 PROCEDURE — 80053 COMPREHEN METABOLIC PANEL: CPT

## 2017-04-22 PROCEDURE — 83690 ASSAY OF LIPASE: CPT

## 2017-04-22 PROCEDURE — 96361 HYDRATE IV INFUSION ADD-ON: CPT

## 2017-04-22 PROCEDURE — 87040 BLOOD CULTURE FOR BACTERIA: CPT

## 2017-04-22 PROCEDURE — 81001 URINALYSIS AUTO W/SCOPE: CPT

## 2017-04-22 PROCEDURE — 63600175 PHARM REV CODE 636 W HCPCS: Performed by: EMERGENCY MEDICINE

## 2017-04-22 PROCEDURE — 96375 TX/PRO/DX INJ NEW DRUG ADDON: CPT

## 2017-04-22 PROCEDURE — 96376 TX/PRO/DX INJ SAME DRUG ADON: CPT

## 2017-04-22 RX ORDER — HEPARIN 100 UNIT/ML
SYRINGE INTRAVENOUS
Status: COMPLETED
Start: 2017-04-22 | End: 2017-04-22

## 2017-04-22 RX ORDER — HYDROMORPHONE HYDROCHLORIDE 1 MG/ML
0.5 INJECTION, SOLUTION INTRAMUSCULAR; INTRAVENOUS; SUBCUTANEOUS
Status: COMPLETED | OUTPATIENT
Start: 2017-04-22 | End: 2017-04-22

## 2017-04-22 RX ORDER — OXYCODONE AND ACETAMINOPHEN 10; 325 MG/1; MG/1
1 TABLET ORAL EVERY 6 HOURS PRN
Qty: 12 TABLET | Refills: 0 | Status: SHIPPED | OUTPATIENT
Start: 2017-04-22 | End: 2017-04-22

## 2017-04-22 RX ORDER — PREDNISONE 10 MG/1
10 TABLET ORAL DAILY
Qty: 30 TABLET | Refills: 0 | Status: SHIPPED | OUTPATIENT
Start: 2017-04-22 | End: 2017-05-02

## 2017-04-22 RX ORDER — ONDANSETRON 8 MG/1
8 TABLET, ORALLY DISINTEGRATING ORAL EVERY 8 HOURS PRN
Qty: 30 TABLET | Refills: 2 | Status: SHIPPED | OUTPATIENT
Start: 2017-04-22 | End: 2017-07-13 | Stop reason: SDUPTHER

## 2017-04-22 RX ORDER — OXYCODONE AND ACETAMINOPHEN 10; 325 MG/1; MG/1
1 TABLET ORAL EVERY 6 HOURS PRN
Qty: 12 TABLET | Refills: 0 | Status: SHIPPED | OUTPATIENT
Start: 2017-04-22 | End: 2017-05-08 | Stop reason: SDUPTHER

## 2017-04-22 RX ORDER — ONDANSETRON 2 MG/ML
8 INJECTION INTRAMUSCULAR; INTRAVENOUS
Status: COMPLETED | OUTPATIENT
Start: 2017-04-22 | End: 2017-04-22

## 2017-04-22 RX ORDER — METHYLPREDNISOLONE SOD SUCC 125 MG
125 VIAL (EA) INJECTION
Status: COMPLETED | OUTPATIENT
Start: 2017-04-22 | End: 2017-04-22

## 2017-04-22 RX ADMIN — METHYLPREDNISOLONE SODIUM SUCCINATE 125 MG: 125 INJECTION, POWDER, FOR SOLUTION INTRAMUSCULAR; INTRAVENOUS at 01:04

## 2017-04-22 RX ADMIN — HEPARIN: 100 SYRINGE at 05:04

## 2017-04-22 RX ADMIN — HYDROMORPHONE HYDROCHLORIDE 0.5 MG: 1 INJECTION, SOLUTION INTRAMUSCULAR; INTRAVENOUS; SUBCUTANEOUS at 04:04

## 2017-04-22 RX ADMIN — ONDANSETRON 8 MG: 2 INJECTION INTRAMUSCULAR; INTRAVENOUS at 12:04

## 2017-04-22 RX ADMIN — HYDROMORPHONE HYDROCHLORIDE 0.5 MG: 1 INJECTION, SOLUTION INTRAMUSCULAR; INTRAVENOUS; SUBCUTANEOUS at 01:04

## 2017-04-22 RX ADMIN — SODIUM CHLORIDE 1000 ML: 0.9 INJECTION, SOLUTION INTRAVENOUS at 01:04

## 2017-04-22 RX ADMIN — SODIUM CHLORIDE 1000 ML: 0.9 INJECTION, SOLUTION INTRAVENOUS at 11:04

## 2017-04-22 RX ADMIN — HYDROMORPHONE HYDROCHLORIDE 0.5 MG: 1 INJECTION, SOLUTION INTRAMUSCULAR; INTRAVENOUS; SUBCUTANEOUS at 11:04

## 2017-04-22 RX ADMIN — SODIUM CHLORIDE 1000 ML: 0.9 INJECTION, SOLUTION INTRAVENOUS at 03:04

## 2017-04-22 RX ADMIN — PROMETHAZINE HYDROCHLORIDE 12.5 MG: 25 INJECTION INTRAMUSCULAR; INTRAVENOUS at 11:04

## 2017-04-22 NOTE — ED TRIAGE NOTES
Pt presents with N/V since Tuesday and increased watery output to colostomy bag. Pt reports pain to abd wall behind her stoma and decreased UO that is very concentrated. She states she has attempted to see her GI MD but he is out of town. Pt has hx of Crohn's and started herself on prednisone and went to the infusion center. Pt staets she has had no relief. Pt reports fever of 101 max temp.

## 2017-04-22 NOTE — ED AVS SNAPSHOT
[facility]  [address]  [facility] [address]               Ivy Salgado   2017 10:53 AM   ED    Description:  Female : 1980   Department:  [facility]           Your Care was Coordinated By:     Provider Role From To    Sanjeev De Leon MD Attending Provider 17 1102 --      Reason for Visit     Emesis           Diagnoses this Visit        Comments    Crohn's disease without complication, unspecified gastrointestinal tract location    -  Primary       ED Disposition     ED Disposition Condition Comment    Discharge             To Do List           Follow-up Information     Please follow up.    Why:  GI clinic       These Medications        Disp Refills Start End    oxycodone-acetaminophen (PERCOCET)  mg per tablet 12 tablet 0 2017     Take 1 tablet by mouth every 6 (six) hours as needed for Pain. - Oral    Pharmacy: [facility]/pharmacy #[id number] - [place] - [address] Ph #: [phone]       ondansetron (ZOFRAN-ODT) 8 MG TbDL 30 tablet 2 2017     Take 1 tablet (8 mg total) by mouth every 8 (eight) hours as needed (nausea). - Oral    Pharmacy: [facility]/pharmacy #[id number] - [place] - [address] Ph #: [phone]       predniSONE (DELTASONE) 10 MG tablet 30 tablet 0 2017    Take 1 tablet (10 mg total) by mouth once daily. Take as directed - Oral    Pharmacy: [facility]/pharmacy #[id number] - [place] - [address] Ph #: [phone]         [facility]     [facility] -  Assistance  Unless otherwise directed by your provider, please contact [facility], our nurse care line that is available for  assistance.     Registered nurses in the [facility] provide: appointment scheduling, clinical advisement, health education, and other advisory services.  Call: [phone] (toll free)               Medications           Message regarding Medications      Verify the changes and/or additions to your medication regime listed below are the same as discussed with your clinician today.  If any of these changes or additions are incorrect, please notify your healthcare provider.        START taking these NEW medications        Refills    oxycodone-acetaminophen (PERCOCET)  mg per tablet 0    Sig: Take 1 tablet by mouth every 6 (six) hours as needed for Pain.    Class: Print    Route: Oral    predniSONE (DELTASONE) 10 MG tablet 0    Sig: Take 1 tablet (10 mg total) by mouth once daily. Take as directed    Class: Print    Route: Oral      These medications were administered today        Dose Freq    sodium chloride 0.9% bolus 1,000 mL 1,000 mL Once    Sig: Inject 1,000 mLs into the vein once.    Class: Normal    Route: Intravenous    promethazine (PHENERGAN) 12.5 mg in dextrose 5 % 50 mL IVPB 12.5 mg ED 1 Time    Sig: Inject 12.5 mg into the vein ED 1 Time.    Class: Normal    Route: Intravenous    HYDROmorphone injection 0.5 mg 0.5 mg ED 1 Time    Sig: Inject 0.5 mLs (0.5 mg total) into the vein ED 1 Time.    Class: Normal    Route: Intravenous    ondansetron injection 8 mg 8 mg ED 1 Time    Sig: Inject 8 mg into the vein ED 1 Time.    Class: Normal    Route: Intravenous    HYDROmorphone injection 0.5 mg 0.5 mg ED 1 Time    Sig: Inject 0.5 mLs (0.5 mg total) into the vein ED 1 Time.    Class: Normal    Route: Intravenous    methylPREDNISolone sodium succinate injection 125 mg 125 mg ED 1 Time    Sig: Inject 125 mg into the vein ED 1 Time.    Class: Normal    Route: Intravenous    sodium chloride 0.9% bolus 1,000 mL 1,000 mL ED 1 Time    Sig: Inject 1,000 mLs into the vein ED 1 Time.    Class: Normal    Route: Intravenous    sodium chloride 0.9% bolus 1,000 mL 1,000 mL ED 1 Time    Sig: Inject 1,000 mLs into the vein ED 1 Time.    Class: Normal    Route: Intravenous    HYDROmorphone injection 0.5 mg 0.5 mg ED 1 Time    Sig: Inject 0.5 mLs (0.5 mg total) into the vein  ED 1 Time.    Class: Normal    Route: Intravenous    heparin, porcine (PF) (heparin flush 100 units/mL) 100 unit/mL injection flush      Notes to Pharmacy: Created by cabinet override      STOP taking these medications     oxycodone-acetaminophen (PERCOCET) 5-325 mg per tablet Take one tablet by mouth every six hours as needed for pain           Verify that the below list of medications is an accurate representation of the medications you are currently taking.  If none reported, the list may be blank. If incorrect, please contact your healthcare provider. Carry this list with you in case of emergency.           Current Medications     acyclovir 5% (ZOVIRAX) 5 % ointment Apply topically 5 (five) times daily.    alprazolam (XANAX) 0.5 MG tablet Take 1 tablet (0.5 mg total) by mouth 2 (two) times daily.    cefdinir (OMNICEF) 300 MG capsule TAKE 1 CAPSULE BY MOUTH EVERY 12 HOURS FOR 7 DAYS    certolizumab pegol (CIMZIA) 400 mg/2 mL (200 mg/mL x 2) SyKt kit Inject 400 mg (2 mLs total) into the skin every 28 days.    diphenoxylate-atropine 2.5-0.025 mg (LOMOTIL) 2.5-0.025 mg per tablet Take 1 tablet by mouth 3 (three) times daily as needed for Diarrhea.    heparin, porcine (PF) (heparin flush 100 units/mL) 100 unit/mL injection flush     hyoscyamine (LEVSIN/SL) 0.125 mg Subl Place 1 tablet (0.125 mg total) under the tongue every 4 (four) hours as needed.    lisinopril 10 MG tablet TAKE 1 TABLET (10 MG TOTAL) BY MOUTH ONCE DAILY.    metronidazole (FLAGYL) 500 MG tablet TAKE 1 TABLET (500 MG TOTAL) BY MOUTH EVERY 12 (TWELVE) HOURS. NO ALCOHOL WHILE TAKING MEDICINE    norethindrone-ethinyl estradiol (GILDESS FE 1/20, 28,) 1 mg-20 mcg (21)/75 mg (7) per tablet Take 1 tablet by mouth once daily. Take one active pill daily continuously.    ondansetron (ZOFRAN-ODT) 8 MG TbDL Take 1 tablet (8 mg total) by mouth every 8 (eight) hours as needed (nausea).    oxycodone-acetaminophen (PERCOCET)  mg per tablet Take 1 tablet by  "mouth every 6 (six) hours as needed for Pain.    predniSONE (DELTASONE) 10 MG tablet Take 1 tablet (10 mg total) by mouth once daily. Take as directed    promethazine (PHENERGAN) 25 MG tablet Take 1 tablet (25 mg total) by mouth every 6 (six) hours as needed for Nausea.    sumatriptan (IMITREX) 100 MG tablet TAKE 1 TABLET BY MOUTH AS NEEDED FOR HEADACHE MAY REPEAT ONCE IN 2 HRS MAX 2 DOSES IN 24 HOURS    valacyclovir (VALTREX) 500 MG tablet Take 1 tablet (500 mg total) by mouth once daily.    zolpidem (AMBIEN) 10 mg Tab TAKE 1 TABLET BY MOUTH AT BEDTIME           Clinical Reference Information           Your Vitals Were     BP Pulse Temp Resp Height Weight    120/74 80 99.4 °F (37.4 °C) (Oral) 20 5' 1" (1.549 m) 47.6 kg (105 lb)    Last Period SpO2 BMI          03/30/2015 100% 19.84 kg/m2        Allergies as of 4/22/2017        Reactions    Azathioprine Sodium     Other reaction(s): pancreatitis  Other reaction(s): pancreatitis    Methotrexate     Other reaction(s): infection-    Morphine Itching, Other (See Comments)    Other reaction(s): Itching      Immunizations Administered on Date of Encounter - 4/22/2017     None      ED Micro, Lab, POCT     Start Ordered       Status Ordering Provider    04/22/17 1120 04/22/17 1119  Blood Culture #2 **CANNOT BE ORDERED STAT**  Once      Acknowledged     04/22/17 1120 04/22/17 1119  Blood Culture #1 **CANNOT BE ORDERED STAT**  Once      In process     04/22/17 1118 04/22/17 1118  Urinalysis Microscopic  Once      Final result     04/22/17 1116 04/22/17 1118  CBC W/ AUTO DIFFERENTIAL  STAT      Final result     04/22/17 1116 04/22/17 1118  Comp. Metabolic Panel  STAT      Final result     04/22/17 1116 04/22/17 1118  Lipase  Once      Final result     04/22/17 1116 04/22/17 1118  Urinalysis - Clean Catch  STAT      Final result     04/22/17 1116 04/22/17 1118  Magnesium  STAT      Final result       ED Imaging Orders     None        Discharge Instructions       Push " fluids  Take the medications as prescribed    Your Scheduled Appointments     Apr 25, 2017  9:00 AM CDT   Infusion 060 Min with INFECTIOUS INFUSION, CHAIR 6   [facility] ([facility])    [address]  [facility] [address]   [phone]               [facility] complies with applicable Federal civil rights laws and does not discriminate on the basis of race, color, national origin, age, disability, or sex.        Language Assistance Services     ATTENTION: Language assistance services are available, free of charge. Please call [phone].      ATENCIÓN: Si habla español, tiene a joy disposición servicios gratuitos de asistencia lingüística. Llame al [phone].     CHÚ Ý: N?u b?n nói Ti?ng Vi?t, có các d?ch v? h? tr? ngôn ng? mi?n phí dành cho b?n. G?i s? [phone].

## 2017-04-22 NOTE — ED PROVIDER NOTES
Encounter Date: 4/22/2017    SCRIBE #1 NOTE: I, Lucius Tyler, am scribing for, and in the presence of,  Dr. De Leon . I have scribed the entire note.       History     Chief Complaint   Patient presents with    Emesis     abd pain. Decreased output from ostomy. Reports fever several days ago.      Review of patient's allergies indicates:   Allergen Reactions    Azathioprine sodium      Other reaction(s): pancreatitis  Other reaction(s): pancreatitis    Methotrexate      Other reaction(s): infection-    Morphine Itching and Other (See Comments)     Other reaction(s): Itching     Time patient was seen by the provider: 11:09 AM    The patient is a 36 y.o. female with hx of: chron's disease, kidney stones, HTN, s/p ileostomy that presents to the ED with a complaint of abdominal pain with associated nausea and vomiting for a week. Pt states that she has had decreased out put from her ileostomy bag and the consistency of the output is different (more watery than usual).  Pt endorses a fever and HA, but denies back pain, CP, SOB, rashes, joint swelling, epistaxis, dysuria, feeling pruritic, hematemesis, and vision changes.  During this recent flair patient started herself on Prednisone and will follow up with her physician who is out of town      The history is provided by the patient.     Past Medical History:   Diagnosis Date    Abnormal Pap smear 2007    Abnormal Pap smear 5/26/2011    Anemia     Anxiety     Arthritis     C. difficile diarrhea     Crohn's disease     Encounter for blood transfusion     Genital HSV     History of colposcopy with cervical biopsy 2007 and 7/2011 2007-LYLA I  and 7/2011- LYLA I    Hypertension     Kidney stone     Recurrent UTI 4/3/2013    S/P ileostomy 7/9/2012    Sterilization 6/23/2012     Past Surgical History:   Procedure Laterality Date    ABDOMINAL SURGERY      APPENDECTOMY      BLADDER SURGERY      partial cystectomy due to fistula    CKC      COLON  SURGERY      COLONOSCOPY      HYSTERECTOMY  04/16/2015    SHELLIE    ILEOSTOMY      OOPHORECTOMY Right 04/16/2015    SKIN BIOPSY      SMALL INTESTINE SURGERY      TOTAL COLECTOMY      TUBAL LIGATION  06 06 2012    UPPER GASTROINTESTINAL ENDOSCOPY       Family History   Problem Relation Age of Onset    Colon cancer Father     Cancer Father      colon cancer    Hypertension Mother     Cancer Maternal Grandfather      esopghal     Skin cancer Maternal Grandfather     Endometrial cancer Maternal Aunt     Crohn's disease Brother     Breast cancer Neg Hx     Ovarian cancer Neg Hx      Social History   Substance Use Topics    Smoking status: Never Smoker    Smokeless tobacco: Never Used    Alcohol use 0.0 oz/week      Comment: Patient only drinks wine not regular basis.     Review of Systems   Constitutional: Positive for fever.   HENT: Negative for nosebleeds and sore throat.    Eyes: Negative for visual disturbance.   Respiratory: Negative for shortness of breath.    Cardiovascular: Negative for chest pain.   Gastrointestinal: Positive for abdominal pain, nausea and vomiting.        Denies hematemesis    Genitourinary: Negative for dysuria.   Musculoskeletal: Negative for back pain and joint swelling.   Skin: Negative for rash.        Denies feeling pruritic    Neurological: Positive for headaches. Negative for weakness.   Hematological: Does not bruise/bleed easily.       Physical Exam   Initial Vitals   BP Pulse Resp Temp SpO2   04/22/17 1027 04/22/17 1027 04/22/17 1027 04/22/17 1027 04/22/17 1027   132/87 110 16 99.4 °F (37.4 °C) 100 %     Physical Exam    Constitutional: She is cooperative. She appears ill. She appears distressed.   HENT:   Head: Normocephalic and atraumatic.   Mouth/Throat: Mucous membranes are dry.   Eyes: No scleral icterus.   Neck: Normal range of motion. Neck supple.   Cardiovascular: Normal rate and regular rhythm.   Pulmonary/Chest: Breath sounds normal. No accessory muscle  usage. No tachypnea. No respiratory distress.   Abdominal:   Ileostomy is present  Mild distension with mild diffuse tenderness   Musculoskeletal: Normal range of motion.   Neurological: She is alert. She has normal strength. No cranial nerve deficit. GCS eye subscore is 4. GCS verbal subscore is 5. GCS motor subscore is 6.   Skin: Skin is warm and dry.         ED Course   Procedures  Labs Reviewed   CBC W/ AUTO DIFFERENTIAL - Abnormal; Notable for the following:        Result Value    Hematocrit 35.1 (*)     All other components within normal limits   COMPREHENSIVE METABOLIC PANEL - Abnormal; Notable for the following:     Potassium 3.2 (*)     CO2 22 (*)     All other components within normal limits   URINALYSIS - Abnormal; Notable for the following:     Ketones, UA 1+ (*)     Occult Blood UA 1+ (*)     Leukocytes, UA Trace (*)     All other components within normal limits   URINALYSIS MICROSCOPIC - Abnormal; Notable for the following:     WBC, UA 25 (*)     All other components within normal limits   CULTURE, BLOOD   LIPASE   MAGNESIUM             Medical Decision Making:   History:   Old Medical Records: I decided to obtain old medical records.  Initial Assessment:   Hx of Crohn's now with decreasing ileostomy output, abdominal discomfort  Differential Diagnosis:   Bowel obstruction  Intraabdominal process  Clinical Tests:   Lab Tests: Ordered and Reviewed  ED Management:  Will await results of labs  Control the pain            Scribe Attestation:   Scribe #1: I performed the above scribed service and the documentation accurately describes the services I performed. I attest to the accuracy of the note.    Attending Attestation:           Physician Attestation for Scribe:  Physician Attestation Statement for Scribe #1: I, Dr. De Leon, reviewed documentation, as scribed by Lucius Tyler  in my presence, and it is both accurate and complete.         Attending ED Notes:   Patient with long hx of Crohn's with  subsequent complications; now with generalized abdominal pain and mild distension. Patient states that this is fairly typical of her intermittent pain and if I could control the pain and rule out any complications.   Evaluated with no obvious worrisome findings. No need for imaging          ED Course     Clinical Impression:   The encounter diagnosis was Crohn's disease without complication, unspecified gastrointestinal tract location.    Disposition:   Disposition: Discharged  Condition: Lakesha De Leon MD  07/19/17 0757

## 2017-04-22 NOTE — ED NOTES
LOC: The patient is awake, alert and aware of environment with an appropriate affect, the patient is oriented x 3 and speaking appropriately.  APPEARANCE: Patient resting comfortably and in no acute distress, patient is clean and well groomed  SKIN: The skin is warm and dry, color consistent with ethnicity, patient has normal skin turgor and moist mucus membranes, skin intact, no breakdown or brusing noted.  MUSCULOSKELETAL: Patient moving all extremities well, no obvious swelling or deformities noted.   RESPIRATORY: Airway is open and patent, breath sounds clear throughout all lung fields; respirations are spontaneous, patient has a normal effort and rate, no accessory muscle use noted.   CARDIAC: Patient has no peripheral edema noted, capillary refill < 3 seconds. No complaints of chest pain   ABDOMEN: Soft and tender to palpation, no distention noted. Bowel sounds present x 4. Pt reports N/V with more watery output to colostomy bag.  NEUROLOGIC: PERRL, 3mm bilaterally, eyes open spontaneously, behavior appropriate to situation, follows commands, facial expression symmetrical, bilateral hand grasp equal and even, purposeful motor response noted, normal sensation in all extremities when touched with a finger.

## 2017-04-24 RX ORDER — ALPRAZOLAM 0.5 MG/1
0.5 TABLET ORAL 2 TIMES DAILY
Qty: 60 TABLET | Refills: 0 | Status: SHIPPED | OUTPATIENT
Start: 2017-04-24 | End: 2017-05-24 | Stop reason: SDUPTHER

## 2017-04-24 RX ORDER — ZOLPIDEM TARTRATE 10 MG/1
TABLET ORAL
Qty: 30 TABLET | Refills: 0 | Status: SHIPPED | OUTPATIENT
Start: 2017-04-24 | End: 2017-05-24 | Stop reason: SDUPTHER

## 2017-04-25 ENCOUNTER — PATIENT MESSAGE (OUTPATIENT)
Dept: GASTROENTEROLOGY | Facility: CLINIC | Age: 37
End: 2017-04-25

## 2017-04-25 ENCOUNTER — INFUSION (OUTPATIENT)
Dept: INFECTIOUS DISEASES | Facility: HOSPITAL | Age: 37
End: 2017-04-25
Attending: INTERNAL MEDICINE
Payer: COMMERCIAL

## 2017-04-25 VITALS — WEIGHT: 105 LBS | BODY MASS INDEX: 19.84 KG/M2

## 2017-04-25 DIAGNOSIS — K50.019 CROHN'S DISEASE OF SMALL INTESTINE WITH COMPLICATION: Primary | ICD-10-CM

## 2017-04-25 PROCEDURE — 96360 HYDRATION IV INFUSION INIT: CPT

## 2017-04-25 PROCEDURE — 96523 IRRIG DRUG DELIVERY DEVICE: CPT

## 2017-04-25 PROCEDURE — 25000003 PHARM REV CODE 250: Performed by: INTERNAL MEDICINE

## 2017-04-25 RX ORDER — SODIUM CHLORIDE 9 MG/ML
INJECTION, SOLUTION INTRAVENOUS ONCE
Status: CANCELLED
Start: 2017-04-26 | End: 2017-04-26

## 2017-04-25 RX ORDER — SODIUM CHLORIDE 0.9 % (FLUSH) 0.9 %
10 SYRINGE (ML) INJECTION
Status: CANCELLED | OUTPATIENT
Start: 2017-04-26

## 2017-04-25 RX ORDER — HEPARIN SODIUM (PORCINE) LOCK FLUSH IV SOLN 100 UNIT/ML 100 UNIT/ML
100 SOLUTION INTRAVENOUS
Status: CANCELLED | OUTPATIENT
Start: 2017-04-26

## 2017-04-25 RX ADMIN — SODIUM CHLORIDE 1000 ML: 0.9 INJECTION, SOLUTION INTRAVENOUS at 08:04

## 2017-04-25 NOTE — PROGRESS NOTES
pt's left subclavian medi port accessed without incidence,  Pt tolerated normal saline bolus( one liter) without incidence, medi port locked with Heparin flush 500 Units/5ml, pt deaccessed, no bleeding noted to site upon removal, band aid in place

## 2017-04-25 NOTE — TELEPHONE ENCOUNTER
Message left for María in the ID infusion suite to call me back to let her know it is ok to give patient 2 liters of fluids today per Dr.James Ross.

## 2017-04-27 ENCOUNTER — OFFICE VISIT (OUTPATIENT)
Dept: GASTROENTEROLOGY | Facility: CLINIC | Age: 37
End: 2017-04-27
Payer: COMMERCIAL

## 2017-04-27 VITALS
BODY MASS INDEX: 18.78 KG/M2 | SYSTOLIC BLOOD PRESSURE: 114 MMHG | DIASTOLIC BLOOD PRESSURE: 75 MMHG | HEART RATE: 104 BPM | WEIGHT: 99.44 LBS | HEIGHT: 61 IN | TEMPERATURE: 99 F

## 2017-04-27 DIAGNOSIS — K50.819 CROHN'S DISEASE OF BOTH SMALL AND LARGE INTESTINE WITH COMPLICATION: Primary | ICD-10-CM

## 2017-04-27 LAB — BACTERIA BLD CULT: NORMAL

## 2017-04-27 PROCEDURE — 99215 OFFICE O/P EST HI 40 MIN: CPT | Mod: S$GLB,,, | Performed by: INTERNAL MEDICINE

## 2017-04-27 PROCEDURE — 99999 PR PBB SHADOW E&M-EST. PATIENT-LVL III: CPT | Mod: PBBFAC,,, | Performed by: INTERNAL MEDICINE

## 2017-04-27 NOTE — MR AVS SNAPSHOT
[facility]  [address]  [facility] [address]  Phone: [phone]  Fax: [phone]                  Ivy Salgado   2017 1:30 PM   Office Visit    Description:  Female : 1980   Provider:  Parish Ross MD   Department:  [facility]           Reason for Visit     Crohn's Disease           Diagnoses this Visit        Comments    Crohn's disease of both small and large intestine with complication    -  Primary            To Do List           Future Appointments        Provider Department Dept Phone    5/3/2017 9:00 AM INFECTIOUS INFUSION, CHAIR 3 [facility] [phone]      Goals (5 Years of Data)     None      [facility]     [facility] -  Assistance  Unless otherwise directed by your provider, please contact [facility], our nurse care line that is available for  assistance.     Registered nurses in the [facility] provide: appointment scheduling, clinical advisement, health education, and other advisory services.  Call: [phone] (toll free)               Medications           Message regarding Medications     Verify the changes and/or additions to your medication regime listed below are the same as discussed with your clinician today.  If any of these changes or additions are incorrect, please notify your healthcare provider.             Verify that the below list of medications is an accurate representation of the medications you are currently taking.  If none reported, the list may be blank. If incorrect, please contact your healthcare provider. Carry this list with you in case of emergency.           Current Medications     acyclovir 5% (ZOVIRAX) 5 % ointment Apply topically 5 (five) times daily.    alprazolam (XANAX) 0.5 MG tablet Take 1 tablet (0.5 mg total) by mouth 2 (two) times daily.    cefdinir (OMNICEF) 300 MG capsule TAKE 1 CAPSULE BY MOUTH EVERY 12 HOURS FOR 7  "DAYS    certolizumab pegol (CIMZIA) 400 mg/2 mL (200 mg/mL x 2) SyKt kit Inject 400 mg (2 mLs total) into the skin every 28 days.    diphenoxylate-atropine 2.5-0.025 mg (LOMOTIL) 2.5-0.025 mg per tablet Take 1 tablet by mouth 3 (three) times daily as needed for Diarrhea.    hyoscyamine (LEVSIN/SL) 0.125 mg Subl Place 1 tablet (0.125 mg total) under the tongue every 4 (four) hours as needed.    lisinopril 10 MG tablet TAKE 1 TABLET (10 MG TOTAL) BY MOUTH ONCE DAILY.    metronidazole (FLAGYL) 500 MG tablet TAKE 1 TABLET (500 MG TOTAL) BY MOUTH EVERY 12 (TWELVE) HOURS. NO ALCOHOL WHILE TAKING MEDICINE    norethindrone-ethinyl estradiol (GILDESS FE 1/20, 28,) 1 mg-20 mcg (21)/75 mg (7) per tablet Take 1 tablet by mouth once daily. Take one active pill daily continuously.    ondansetron (ZOFRAN-ODT) 8 MG TbDL Take 1 tablet (8 mg total) by mouth every 8 (eight) hours as needed (nausea).    oxycodone-acetaminophen (PERCOCET)  mg per tablet Take 1 tablet by mouth every 6 (six) hours as needed for Pain.    predniSONE (DELTASONE) 10 MG tablet Take 1 tablet (10 mg total) by mouth once daily. Take as directed    promethazine (PHENERGAN) 25 MG tablet Take 1 tablet (25 mg total) by mouth every 6 (six) hours as needed for Nausea.    sumatriptan (IMITREX) 100 MG tablet TAKE 1 TABLET BY MOUTH AS NEEDED FOR HEADACHE MAY REPEAT ONCE IN 2 HRS MAX 2 DOSES IN 24 HOURS    zolpidem (AMBIEN) 10 mg Tab TAKE 1 TABLET BY MOUTH AT BEDTIME    valacyclovir (VALTREX) 500 MG tablet Take 1 tablet (500 mg total) by mouth once daily.           Clinical Reference Information           Your Vitals Were     Temp Height Weight Last Period BMI    99.3 °F (37.4 °C) 5' 1" (1.549 m) 45.1 kg (99 lb 6.8 oz) 03/30/2015 18.79 kg/m2      Allergies as of 4/27/2017     Azathioprine Sodium    Methotrexate    Morphine      Immunizations Administered on Date of Encounter - 4/27/2017     None      Orders Placed During Today's Visit     Future Labs/Procedures " Expected by Expires    Clostridium difficile EIA  4/27/2017 6/26/2018      Language Assistance Services     ATTENTION: Language assistance services are available, free of charge. Please call [phone].      ATENCIÓN: Si habjulia arroyo, tiene a joy disposición servicios gratuitos de asistencia lingüística. Llame al [phone].     CHÚ Ý: N?u b?n nói Ti?ng Vi?t, có các d?ch v? h? tr? ngôn ng? mi?n phí dành cho b?n. G?i s? [phone].         [facility] complies with applicable Federal civil rights laws and does not discriminate on the basis of race, color, national origin, age, disability, or sex.

## 2017-04-27 NOTE — PROGRESS NOTES
Subjective:       Patient ID: Ivy Salgado is a 36 y.o. female.    Chief Complaint: Crohn's Disease    HPI  Review of Systems   Constitutional: Positive for fatigue and fever. Negative for activity change, appetite change, chills, diaphoresis and unexpected weight change.   HENT: Negative for congestion, ear pain, mouth sores, nosebleeds, postnasal drip, rhinorrhea, sinus pressure, sore throat, trouble swallowing and voice change.    Eyes: Negative for pain.   Respiratory: Negative for cough, shortness of breath and wheezing.    Cardiovascular: Negative for chest pain, palpitations and leg swelling.   Genitourinary: Negative for difficulty urinating, dysuria, flank pain, hematuria and menstrual problem.   Musculoskeletal: Negative for arthralgias, back pain, gait problem, joint swelling, myalgias and neck pain.   Skin: Negative for rash.   Neurological: Negative for dizziness, tremors, syncope, numbness and headaches.   Hematological: Negative for adenopathy. Bruises/bleeds easily.   Psychiatric/Behavioral: Negative for agitation, behavioral problems, confusion, decreased concentration and dysphoric mood. The patient is not nervous/anxious.        Objective:      Physical Exam   Constitutional: She is oriented to person, place, and time. She appears well-developed and well-nourished. No distress.   HENT:   Head: Normocephalic and atraumatic.   Right Ear: External ear normal.   Left Ear: External ear normal.   Nose: Nose normal.   Mouth/Throat: Oropharynx is clear and moist. No oropharyngeal exudate.   Eyes: Conjunctivae are normal. Pupils are equal, round, and reactive to light. No scleral icterus.   Neck: Normal range of motion. Neck supple. No thyromegaly present.   Cardiovascular: Normal rate, regular rhythm, normal heart sounds and intact distal pulses.  Exam reveals no gallop.    No murmur heard.  Pulmonary/Chest: Effort normal and breath sounds normal. She has no wheezes. She has no rales.   Abdominal:  Soft. Normal appearance and bowel sounds are normal. She exhibits no shifting dullness, no distension, no fluid wave, no abdominal bruit and no mass. There is no hepatosplenomegaly. There is generalized tenderness.   Musculoskeletal: Normal range of motion. She exhibits no edema or tenderness.   Lymphadenopathy:     She has no cervical adenopathy.   Neurological: She is alert and oriented to person, place, and time. No cranial nerve deficit.   Skin: Skin is warm and dry. No rash noted.   Psychiatric: She has a normal mood and affect. Her behavior is normal. Judgment and thought content normal.       Assessment:       1. Crohn's disease of both small and large intestine with complication        Plan:         HISTORY OF PRESENT ILLNESS:  This is an emergency add-on appointment for Ivy.    She is here with Crohn disease and prior colectomy and ileostomy.  She has   really been having problems since this past fall.  She has had an admission and   a prolonged hospitalization in the fall with episode of small bowel obstruction.    At that time, she had EGD and ileoscopy to rule out any sign of recurrent   active Crohn's.  There was no indication of active Crohn's.  It is felt that   maybe the obstruction was due to adhesions, but since that admission, she has   had continued problems.    She comes in now and she really feels like she is going downhill.  She has   continued to have increased output from her ileostomy.  She is having abdominal   pain.  The abdominal pain is in the low mid abdomen to right beneath the   ileostomy in the right lower quadrant.  It is a cramping pain.  It can be after   meals, but not necessarily so, she has it at other times.  We have tried   hyoscyamine for the pain, it does not seem to help much.  She is losing weight,   has poor appetite and fatigue.  She has nausea and occasional vomiting.    She has tried various medications including she tried going on prednisone 20 mg   herself that  did not help.  She uses Imodium and Lomotil for the output.  She   had a week long course of Flagyl for other problems and that did not seem to   help her GI issues.    She was in the Emergency Room last week.  I reviewed those labs and notes.  I   have set her up for IV infusions for IV fluids and that helps keep her hydrated.    ASSESSMENT AND DECISION MAKING:  Crohn disease.  She has had increase in   symptoms, which include abdominal pain, increased ileostomy output along with   fatigue.  I am not sure whether this is due to active Crohn's which is now   unresponsive to the Cimzia or whether some other process.  She has had C. diff   twice in the remote past, but not in the past year or more.  Multiple C. diff   assays have been negative, but I will check another one for that today.  If it   is negative, I feel like we need to assess her Crohn's.  We have several options   to do that.  One would be a capsule study.  However, I would be afraid of that   becoming impacted and requiring surgery given her past obstruction.  However, we   could consider agile capsule ahead of time and that would be helpful.  We could   do CT enterography or enterography.  She has had multiple CT scans.  We talked   about the potential cumulative risk of long-term radiation injury and therefore,   we are going to try to do an MR enterography on her.  If it clearly shows   active Crohn disease then I am going to change her Crohn medicine probably to   Entyvio.  If it does not show Crohn's, then I think we should do an agile   capsule followed by video capsule.  This is a very complicated scenario, but I   think this is the appropriate course at this time.      FAUSTO  dd: 04/27/2017 15:26:01 (CDT)  td: 04/28/2017 10:08:01 (CDT)  Doc ID   #[id number]  Job ID #[id number]    CC:

## 2017-04-28 ENCOUNTER — TELEPHONE (OUTPATIENT)
Dept: GASTROENTEROLOGY | Facility: CLINIC | Age: 37
End: 2017-04-28

## 2017-04-28 DIAGNOSIS — R19.7 ACUTE DIARRHEA: Primary | ICD-10-CM

## 2017-04-28 NOTE — TELEPHONE ENCOUNTER
----- Message from Parish Ross MD sent at 4/28/2017 10:57 AM CDT -----  Contact: Lab Client Services  Ok, when loose again will try again.   ----- Message -----     From: Belinda Sullivan MA     Sent: 4/28/2017  10:43 AM       To: Parish Ross MD    When I spoke with her this AM she stated it was formed.  ----- Message -----     From: Parish Ross MD     Sent: 4/28/2017  10:41 AM       To: Belinda Sullivan MA    New order done, i didn't think she ever had formed stool  ----- Message -----     From: Belinda Sullivan MA     Sent: 4/28/2017   8:54 AM       To: MD Owen Cornelius,  I need a new order for the C-diff.  ----- Message -----     From: Parish Ross MD     Sent: 4/28/2017   8:04 AM       To: Cody Geronimo    Lets try again  ----- Message -----     From: Kelby Melgar     Sent: 4/28/2017   7:50 AM       To: Parish Ross MD    Hi,           my name is Kelby we received a specimen for Clostridium Difficile test. The test was unable to be performed due to the stool sample was formed stool. If you have any questions regarding this please contact client services at [phone]. Thank You.

## 2017-04-28 NOTE — TELEPHONE ENCOUNTER
----- Message from Parish Ross MD sent at 4/28/2017  8:04 AM CDT -----  Contact: Lab Client Services  Lets try again  ----- Message -----     From: Kelby Melgar     Sent: 4/28/2017   7:50 AM       To: Parish Ross MD    Hi,           my name is Kelby we received a specimen for Clostridium Difficile test. The test was unable to be performed due to the stool sample was formed stool. If you have any questions regarding this please contact client services at [phone]. Thank You.

## 2017-05-02 ENCOUNTER — PATIENT MESSAGE (OUTPATIENT)
Dept: GASTROENTEROLOGY | Facility: CLINIC | Age: 37
End: 2017-05-02

## 2017-05-03 ENCOUNTER — PATIENT MESSAGE (OUTPATIENT)
Dept: GASTROENTEROLOGY | Facility: CLINIC | Age: 37
End: 2017-05-03

## 2017-05-05 ENCOUNTER — INFUSION (OUTPATIENT)
Dept: INFECTIOUS DISEASES | Facility: HOSPITAL | Age: 37
End: 2017-05-05
Attending: INTERNAL MEDICINE
Payer: COMMERCIAL

## 2017-05-05 VITALS
HEIGHT: 61 IN | DIASTOLIC BLOOD PRESSURE: 64 MMHG | TEMPERATURE: 99 F | HEART RATE: 76 BPM | OXYGEN SATURATION: 99 % | BODY MASS INDEX: 18.69 KG/M2 | WEIGHT: 99 LBS | RESPIRATION RATE: 22 BRPM | SYSTOLIC BLOOD PRESSURE: 95 MMHG

## 2017-05-05 DIAGNOSIS — K50.019 CROHN'S DISEASE OF SMALL INTESTINE WITH COMPLICATION: Primary | ICD-10-CM

## 2017-05-05 PROCEDURE — 96361 HYDRATE IV INFUSION ADD-ON: CPT

## 2017-05-05 PROCEDURE — 25000003 PHARM REV CODE 250: Performed by: INTERNAL MEDICINE

## 2017-05-05 PROCEDURE — 96360 HYDRATION IV INFUSION INIT: CPT

## 2017-05-05 RX ORDER — HEPARIN SODIUM (PORCINE) LOCK FLUSH IV SOLN 100 UNIT/ML 100 UNIT/ML
100 SOLUTION INTRAVENOUS
Status: CANCELLED | OUTPATIENT
Start: 2017-05-10

## 2017-05-05 RX ORDER — SODIUM CHLORIDE 9 MG/ML
INJECTION, SOLUTION INTRAVENOUS ONCE
Status: CANCELLED
Start: 2017-05-10 | End: 2017-05-10

## 2017-05-05 RX ORDER — SODIUM CHLORIDE 0.9 % (FLUSH) 0.9 %
10 SYRINGE (ML) INJECTION
Status: CANCELLED | OUTPATIENT
Start: 2017-05-10

## 2017-05-05 RX ADMIN — SODIUM CHLORIDE 2000 ML: 0.9 INJECTION, SOLUTION INTRAVENOUS at 09:05

## 2017-05-05 NOTE — PROGRESS NOTES
pt's left subclavian medi port accessed without incidence,  Pt tolerated normal saline bolus( two liter) without incidence, medi port locked with Heparin flush 500 Units/5ml, pt deaccessed, no bleeding noted to site upon removal, band aid in place

## 2017-05-08 DIAGNOSIS — K50.819 CROHN'S DISEASE OF SMALL AND LARGE INTESTINES WITH COMPLICATION: Primary | ICD-10-CM

## 2017-05-08 RX ORDER — DIPHENOXYLATE HYDROCHLORIDE AND ATROPINE SULFATE 2.5; .025 MG/1; MG/1
1 TABLET ORAL 3 TIMES DAILY PRN
Qty: 90 TABLET | Refills: 3 | Status: SHIPPED | OUTPATIENT
Start: 2017-05-08 | End: 2017-10-09 | Stop reason: SDUPTHER

## 2017-05-08 RX ORDER — PREDNISONE 10 MG/1
20 TABLET ORAL 2 TIMES DAILY
Qty: 80 TABLET | Refills: 1 | Status: SHIPPED | OUTPATIENT
Start: 2017-05-08 | End: 2017-05-18

## 2017-05-08 RX ORDER — OXYCODONE AND ACETAMINOPHEN 10; 325 MG/1; MG/1
1 TABLET ORAL EVERY 6 HOURS PRN
Qty: 30 TABLET | Refills: 0 | Status: SHIPPED | OUTPATIENT
Start: 2017-05-08 | End: 2017-05-29 | Stop reason: SDUPTHER

## 2017-05-10 ENCOUNTER — INFUSION (OUTPATIENT)
Dept: INFECTIOUS DISEASES | Facility: HOSPITAL | Age: 37
End: 2017-05-10
Attending: INTERNAL MEDICINE
Payer: COMMERCIAL

## 2017-05-10 DIAGNOSIS — K50.019 CROHN'S DISEASE OF SMALL INTESTINE WITH COMPLICATION: Primary | ICD-10-CM

## 2017-05-10 PROCEDURE — 96360 HYDRATION IV INFUSION INIT: CPT

## 2017-05-10 PROCEDURE — 96361 HYDRATE IV INFUSION ADD-ON: CPT

## 2017-05-10 PROCEDURE — 25000003 PHARM REV CODE 250: Performed by: INTERNAL MEDICINE

## 2017-05-10 RX ORDER — SODIUM CHLORIDE 9 MG/ML
INJECTION, SOLUTION INTRAVENOUS ONCE
Status: CANCELLED
Start: 2017-05-17 | End: 2017-05-17

## 2017-05-10 RX ORDER — SODIUM CHLORIDE 0.9 % (FLUSH) 0.9 %
10 SYRINGE (ML) INJECTION
Status: CANCELLED | OUTPATIENT
Start: 2017-05-17

## 2017-05-10 RX ORDER — HEPARIN SODIUM (PORCINE) LOCK FLUSH IV SOLN 100 UNIT/ML 100 UNIT/ML
100 SOLUTION INTRAVENOUS
Status: CANCELLED | OUTPATIENT
Start: 2017-05-17

## 2017-05-10 RX ADMIN — SODIUM CHLORIDE 2000 ML: 0.9 INJECTION, SOLUTION INTRAVENOUS at 11:05

## 2017-05-12 ENCOUNTER — HOSPITAL ENCOUNTER (OUTPATIENT)
Dept: RADIOLOGY | Facility: HOSPITAL | Age: 37
Discharge: HOME | End: 2017-05-12
Attending: INTERNAL MEDICINE
Payer: COMMERCIAL

## 2017-05-12 DIAGNOSIS — K50.819 CROHN'S DISEASE OF BOTH SMALL AND LARGE INTESTINE WITH COMPLICATION: ICD-10-CM

## 2017-05-12 PROCEDURE — 25500020 PHARM REV CODE 255: Performed by: INTERNAL MEDICINE

## 2017-05-12 PROCEDURE — 72197 MRI PELVIS W/O & W/DYE: CPT | Mod: 26,,, | Performed by: RADIOLOGY

## 2017-05-12 PROCEDURE — 72197 MRI PELVIS W/O & W/DYE: CPT | Mod: TC

## 2017-05-12 PROCEDURE — A9585 GADOBUTROL INJECTION: HCPCS | Performed by: INTERNAL MEDICINE

## 2017-05-12 PROCEDURE — 74183 MRI ABD W/O CNTR FLWD CNTR: CPT | Mod: 26,,, | Performed by: RADIOLOGY

## 2017-05-12 RX ORDER — GADOBUTROL 604.72 MG/ML
10 INJECTION INTRAVENOUS
Status: COMPLETED | OUTPATIENT
Start: 2017-05-12 | End: 2017-05-12

## 2017-05-12 RX ADMIN — GADOBUTROL 10 ML: 604.72 INJECTION INTRAVENOUS at 02:05

## 2017-05-16 ENCOUNTER — PATIENT MESSAGE (OUTPATIENT)
Dept: GASTROENTEROLOGY | Facility: CLINIC | Age: 37
End: 2017-05-16

## 2017-05-16 ENCOUNTER — PATIENT MESSAGE (OUTPATIENT)
Dept: OBSTETRICS AND GYNECOLOGY | Facility: CLINIC | Age: 37
End: 2017-05-16

## 2017-05-16 ENCOUNTER — INFUSION (OUTPATIENT)
Dept: INFECTIOUS DISEASES | Facility: HOSPITAL | Age: 37
End: 2017-05-16
Attending: INTERNAL MEDICINE
Payer: COMMERCIAL

## 2017-05-16 VITALS
RESPIRATION RATE: 19 BRPM | HEART RATE: 85 BPM | HEIGHT: 61 IN | BODY MASS INDEX: 18.73 KG/M2 | TEMPERATURE: 98 F | WEIGHT: 99.19 LBS | OXYGEN SATURATION: 97 % | DIASTOLIC BLOOD PRESSURE: 84 MMHG | SYSTOLIC BLOOD PRESSURE: 135 MMHG

## 2017-05-16 DIAGNOSIS — K50.019 CROHN'S DISEASE OF SMALL INTESTINE WITH COMPLICATION: Primary | ICD-10-CM

## 2017-05-16 PROCEDURE — 96361 HYDRATE IV INFUSION ADD-ON: CPT

## 2017-05-16 PROCEDURE — 96360 HYDRATION IV INFUSION INIT: CPT

## 2017-05-16 PROCEDURE — 25000003 PHARM REV CODE 250: Performed by: INTERNAL MEDICINE

## 2017-05-16 RX ORDER — SODIUM CHLORIDE 9 MG/ML
INJECTION, SOLUTION INTRAVENOUS ONCE
Status: CANCELLED
Start: 2017-05-17 | End: 2017-05-17

## 2017-05-16 RX ORDER — HEPARIN SODIUM (PORCINE) LOCK FLUSH IV SOLN 100 UNIT/ML 100 UNIT/ML
100 SOLUTION INTRAVENOUS
Status: CANCELLED | OUTPATIENT
Start: 2017-05-17

## 2017-05-16 RX ORDER — SODIUM CHLORIDE 9 MG/ML
INJECTION, SOLUTION INTRAVENOUS ONCE
Status: DISCONTINUED | OUTPATIENT
Start: 2017-05-16 | End: 2017-05-16 | Stop reason: HOSPADM

## 2017-05-16 RX ORDER — SODIUM CHLORIDE 0.9 % (FLUSH) 0.9 %
10 SYRINGE (ML) INJECTION
Status: CANCELLED | OUTPATIENT
Start: 2017-05-17

## 2017-05-16 RX ADMIN — SODIUM CHLORIDE 2000 ML: 0.9 INJECTION, SOLUTION INTRAVENOUS at 10:05

## 2017-05-16 NOTE — PROGRESS NOTES
pt's left subclavian medi port accessed without incidence on first stick, normal saline bolus in progress at this time,  Pt tolerated normal saline bolus(2Liters) without incidence, medi port locked with Heparin flush 500 Units/5ml, pt deaccessed, no bleeding noted to site upon removal, band aid in palce

## 2017-05-22 ENCOUNTER — PATIENT MESSAGE (OUTPATIENT)
Dept: OBSTETRICS AND GYNECOLOGY | Facility: CLINIC | Age: 37
End: 2017-05-22

## 2017-05-22 DIAGNOSIS — N39.0 URINARY TRACT INFECTION WITHOUT HEMATURIA, SITE UNSPECIFIED: Primary | ICD-10-CM

## 2017-05-22 RX ORDER — METRONIDAZOLE 500 MG/1
500 TABLET ORAL 2 TIMES DAILY
Qty: 14 TABLET | Refills: 0 | Status: SHIPPED | OUTPATIENT
Start: 2017-05-22 | End: 2017-05-29

## 2017-05-22 NOTE — TELEPHONE ENCOUNTER
Please advise to my chart encounter:    Last GYN routine exam  10/03/16 (PAP:WNL) Patient requesting Flagyl for a reoccurring BV and would like to do a urine culture at main campus for UTI like symptoms. I will let her know once order is in. Order for urine culture and Flagyl pending. Patient will be notified if not better will need to be seen. Please advise.

## 2017-05-24 RX ORDER — ALPRAZOLAM 0.5 MG/1
0.5 TABLET ORAL 2 TIMES DAILY
Qty: 60 TABLET | Refills: 0 | Status: SHIPPED | OUTPATIENT
Start: 2017-05-24 | End: 2017-06-23 | Stop reason: SDUPTHER

## 2017-05-24 RX ORDER — ZOLPIDEM TARTRATE 10 MG/1
TABLET ORAL
Qty: 30 TABLET | Refills: 0 | Status: SHIPPED | OUTPATIENT
Start: 2017-05-24 | End: 2017-06-23 | Stop reason: SDUPTHER

## 2017-05-25 ENCOUNTER — LAB VISIT (OUTPATIENT)
Dept: LAB | Facility: HOSPITAL | Age: 37
End: 2017-05-25
Attending: OBSTETRICS & GYNECOLOGY
Payer: COMMERCIAL

## 2017-05-25 ENCOUNTER — INFUSION (OUTPATIENT)
Dept: INFECTIOUS DISEASES | Facility: HOSPITAL | Age: 37
End: 2017-05-25
Attending: INTERNAL MEDICINE
Payer: COMMERCIAL

## 2017-05-25 ENCOUNTER — PATIENT MESSAGE (OUTPATIENT)
Dept: GASTROENTEROLOGY | Facility: CLINIC | Age: 37
End: 2017-05-25

## 2017-05-25 VITALS — BODY MASS INDEX: 18.69 KG/M2 | HEIGHT: 61 IN | WEIGHT: 99 LBS

## 2017-05-25 DIAGNOSIS — N39.0 URINARY TRACT INFECTION WITHOUT HEMATURIA, SITE UNSPECIFIED: ICD-10-CM

## 2017-05-25 DIAGNOSIS — K50.019 CROHN'S DISEASE OF SMALL INTESTINE WITH COMPLICATION: Primary | ICD-10-CM

## 2017-05-25 PROCEDURE — 87088 URINE BACTERIA CULTURE: CPT

## 2017-05-25 PROCEDURE — 87186 SC STD MICRODIL/AGAR DIL: CPT

## 2017-05-25 PROCEDURE — 87086 URINE CULTURE/COLONY COUNT: CPT

## 2017-05-25 PROCEDURE — 25000003 PHARM REV CODE 250: Performed by: INTERNAL MEDICINE

## 2017-05-25 PROCEDURE — 96366 THER/PROPH/DIAG IV INF ADDON: CPT

## 2017-05-25 PROCEDURE — 87077 CULTURE AEROBIC IDENTIFY: CPT

## 2017-05-25 PROCEDURE — 96365 THER/PROPH/DIAG IV INF INIT: CPT

## 2017-05-25 RX ORDER — HEPARIN SODIUM (PORCINE) LOCK FLUSH IV SOLN 100 UNIT/ML 100 UNIT/ML
100 SOLUTION INTRAVENOUS
Status: CANCELLED | OUTPATIENT
Start: 2017-05-25

## 2017-05-25 RX ORDER — SODIUM CHLORIDE 9 MG/ML
INJECTION, SOLUTION INTRAVENOUS ONCE
Status: CANCELLED
Start: 2017-05-25 | End: 2017-05-25

## 2017-05-25 RX ORDER — SODIUM CHLORIDE 0.9 % (FLUSH) 0.9 %
10 SYRINGE (ML) INJECTION
Status: CANCELLED | OUTPATIENT
Start: 2017-05-25

## 2017-05-25 RX ADMIN — SODIUM CHLORIDE 2000 ML: 0.9 INJECTION, SOLUTION INTRAVENOUS at 01:05

## 2017-05-28 LAB — BACTERIA UR CULT: NORMAL

## 2017-05-29 ENCOUNTER — PATIENT MESSAGE (OUTPATIENT)
Dept: OBSTETRICS AND GYNECOLOGY | Facility: CLINIC | Age: 37
End: 2017-05-29

## 2017-05-29 DIAGNOSIS — K50.819 CROHN'S DISEASE OF SMALL AND LARGE INTESTINES WITH COMPLICATION: ICD-10-CM

## 2017-05-29 RX ORDER — NITROFURANTOIN 25; 75 MG/1; MG/1
100 CAPSULE ORAL 2 TIMES DAILY
Qty: 14 CAPSULE | Refills: 0 | Status: SHIPPED | OUTPATIENT
Start: 2017-05-29 | End: 2017-06-05

## 2017-05-29 RX ORDER — OXYCODONE AND ACETAMINOPHEN 10; 325 MG/1; MG/1
1 TABLET ORAL EVERY 6 HOURS PRN
Qty: 30 TABLET | Refills: 0 | Status: SHIPPED | OUTPATIENT
Start: 2017-05-29 | End: 2017-06-21 | Stop reason: SDUPTHER

## 2017-05-30 ENCOUNTER — INFUSION (OUTPATIENT)
Dept: INFECTIOUS DISEASES | Facility: HOSPITAL | Age: 37
End: 2017-05-30
Attending: INTERNAL MEDICINE
Payer: COMMERCIAL

## 2017-05-30 VITALS
OXYGEN SATURATION: 98 % | TEMPERATURE: 99 F | SYSTOLIC BLOOD PRESSURE: 119 MMHG | HEART RATE: 88 BPM | BODY MASS INDEX: 18.69 KG/M2 | HEIGHT: 61 IN | WEIGHT: 99 LBS | RESPIRATION RATE: 16 BRPM | DIASTOLIC BLOOD PRESSURE: 74 MMHG

## 2017-05-30 DIAGNOSIS — K50.019 CROHN'S DISEASE OF SMALL INTESTINE WITH COMPLICATION: Primary | ICD-10-CM

## 2017-05-30 PROCEDURE — 96360 HYDRATION IV INFUSION INIT: CPT

## 2017-05-30 PROCEDURE — 96361 HYDRATE IV INFUSION ADD-ON: CPT

## 2017-05-30 PROCEDURE — 25000003 PHARM REV CODE 250: Performed by: INTERNAL MEDICINE

## 2017-05-30 RX ORDER — SODIUM CHLORIDE 0.9 % (FLUSH) 0.9 %
10 SYRINGE (ML) INJECTION
Status: CANCELLED | OUTPATIENT
Start: 2017-05-31

## 2017-05-30 RX ORDER — HEPARIN SODIUM (PORCINE) LOCK FLUSH IV SOLN 100 UNIT/ML 100 UNIT/ML
100 SOLUTION INTRAVENOUS
Status: CANCELLED | OUTPATIENT
Start: 2017-05-31

## 2017-05-30 RX ORDER — SODIUM CHLORIDE 9 MG/ML
INJECTION, SOLUTION INTRAVENOUS ONCE
Status: CANCELLED
Start: 2017-05-31 | End: 2017-05-31

## 2017-05-30 RX ADMIN — SODIUM CHLORIDE 2000 ML: 0.9 INJECTION, SOLUTION INTRAVENOUS at 01:05

## 2017-06-02 ENCOUNTER — TELEPHONE (OUTPATIENT)
Dept: PHARMACY | Facility: CLINIC | Age: 37
End: 2017-06-02

## 2017-06-02 NOTE — TELEPHONE ENCOUNTER
Good afternoon,    We received a transferred prescription for Ms. Salgado from [facility] for her Cimzia. I wanted to confirm that this continuation of treatment with Cimzia is correct before we proceed with a PA.     Thanks,   Puja Dunbar, PharmD  [facility]

## 2017-06-05 RX ORDER — HEPARIN 100 UNIT/ML
500 SYRINGE INTRAVENOUS
Status: CANCELLED | OUTPATIENT
Start: 2017-06-05

## 2017-06-05 RX ORDER — EPINEPHRINE 1 MG/ML
0.3 INJECTION, SOLUTION, CONCENTRATE INTRAVENOUS
Status: CANCELLED | OUTPATIENT
Start: 2017-06-05

## 2017-06-05 RX ORDER — SODIUM CHLORIDE 0.9 % (FLUSH) 0.9 %
10 SYRINGE (ML) INJECTION
Status: CANCELLED | OUTPATIENT
Start: 2017-06-05

## 2017-06-05 RX ORDER — DIPHENHYDRAMINE HYDROCHLORIDE 50 MG/ML
25 INJECTION INTRAMUSCULAR; INTRAVENOUS
Status: CANCELLED | OUTPATIENT
Start: 2017-06-05

## 2017-06-05 RX ORDER — IPRATROPIUM BROMIDE AND ALBUTEROL SULFATE 2.5; .5 MG/3ML; MG/3ML
3 SOLUTION RESPIRATORY (INHALATION)
Status: CANCELLED | OUTPATIENT
Start: 2017-06-05

## 2017-06-05 RX ORDER — ACETAMINOPHEN 325 MG/1
650 TABLET ORAL
Status: CANCELLED | OUTPATIENT
Start: 2017-06-05

## 2017-06-05 NOTE — PROGRESS NOTES
Verbal orders received for Stelara 260 mg IV once per Dr. Ross    Orders entered and pended for Dr. Rsos's approval.

## 2017-06-06 ENCOUNTER — INFUSION (OUTPATIENT)
Dept: INFECTIOUS DISEASES | Facility: HOSPITAL | Age: 37
End: 2017-06-06
Attending: INTERNAL MEDICINE
Payer: COMMERCIAL

## 2017-06-06 VITALS
HEIGHT: 61 IN | WEIGHT: 99 LBS | SYSTOLIC BLOOD PRESSURE: 107 MMHG | DIASTOLIC BLOOD PRESSURE: 69 MMHG | BODY MASS INDEX: 18.69 KG/M2 | HEART RATE: 70 BPM

## 2017-06-06 DIAGNOSIS — K50.90 CROHN'S DISEASE: Primary | ICD-10-CM

## 2017-06-06 DIAGNOSIS — K50.019 CROHN'S DISEASE OF SMALL INTESTINE WITH COMPLICATION: ICD-10-CM

## 2017-06-06 PROCEDURE — 25000003 PHARM REV CODE 250: Performed by: INTERNAL MEDICINE

## 2017-06-06 PROCEDURE — 96360 HYDRATION IV INFUSION INIT: CPT

## 2017-06-06 PROCEDURE — 96361 HYDRATE IV INFUSION ADD-ON: CPT

## 2017-06-06 RX ORDER — HEPARIN 100 UNIT/ML
500 SYRINGE INTRAVENOUS
Status: DISCONTINUED | OUTPATIENT
Start: 2017-06-06 | End: 2017-06-06 | Stop reason: HOSPADM

## 2017-06-06 RX ORDER — DIPHENHYDRAMINE HYDROCHLORIDE 50 MG/ML
25 INJECTION INTRAMUSCULAR; INTRAVENOUS
Status: CANCELLED | OUTPATIENT
Start: 2017-06-06

## 2017-06-06 RX ORDER — IPRATROPIUM BROMIDE AND ALBUTEROL SULFATE 2.5; .5 MG/3ML; MG/3ML
3 SOLUTION RESPIRATORY (INHALATION)
Status: CANCELLED | OUTPATIENT
Start: 2017-06-06

## 2017-06-06 RX ORDER — HEPARIN 100 UNIT/ML
500 SYRINGE INTRAVENOUS
Status: CANCELLED | OUTPATIENT
Start: 2017-06-06

## 2017-06-06 RX ORDER — ACETAMINOPHEN 325 MG/1
650 TABLET ORAL
Status: CANCELLED | OUTPATIENT
Start: 2017-06-06

## 2017-06-06 RX ORDER — SODIUM CHLORIDE 0.9 % (FLUSH) 0.9 %
10 SYRINGE (ML) INJECTION
Status: DISCONTINUED | OUTPATIENT
Start: 2017-06-06 | End: 2017-06-06 | Stop reason: HOSPADM

## 2017-06-06 RX ORDER — EPINEPHRINE 1 MG/ML
0.3 INJECTION, SOLUTION, CONCENTRATE INTRAVENOUS
Status: CANCELLED | OUTPATIENT
Start: 2017-06-06

## 2017-06-06 RX ORDER — SODIUM CHLORIDE 0.9 % (FLUSH) 0.9 %
10 SYRINGE (ML) INJECTION
Status: CANCELLED | OUTPATIENT
Start: 2017-06-06

## 2017-06-06 RX ADMIN — SODIUM CHLORIDE 2000 ML: 0.9 INJECTION, SOLUTION INTRAVENOUS at 11:06

## 2017-06-06 NOTE — PLAN OF CARE
Problem: Health Knowledge, Opportunity to Enhance (Adult,NICU,[place],Obstetrics,Pediatric)  Goal: Knowledgeable about Health Subject/Topic  Patient will demonstrate the desired outcomes by discharge/transition of care.  Outcome: Ongoing (interventions implemented as appropriate)  Pt educated on proper hand hygiene and infection risks. Pt verbalized understanding.

## 2017-06-15 ENCOUNTER — PATIENT MESSAGE (OUTPATIENT)
Dept: OBSTETRICS AND GYNECOLOGY | Facility: CLINIC | Age: 37
End: 2017-06-15

## 2017-06-15 ENCOUNTER — INFUSION (OUTPATIENT)
Dept: INFECTIOUS DISEASES | Facility: HOSPITAL | Age: 37
End: 2017-06-15
Attending: INTERNAL MEDICINE
Payer: COMMERCIAL

## 2017-06-15 VITALS
HEIGHT: 61 IN | HEART RATE: 76 BPM | SYSTOLIC BLOOD PRESSURE: 135 MMHG | WEIGHT: 99 LBS | BODY MASS INDEX: 18.69 KG/M2 | DIASTOLIC BLOOD PRESSURE: 92 MMHG

## 2017-06-15 DIAGNOSIS — K50.90 CROHN'S DISEASE: Primary | ICD-10-CM

## 2017-06-15 DIAGNOSIS — K50.019 CROHN'S DISEASE OF SMALL INTESTINE WITH COMPLICATION: ICD-10-CM

## 2017-06-15 PROCEDURE — 25000003 PHARM REV CODE 250: Performed by: INTERNAL MEDICINE

## 2017-06-15 PROCEDURE — 96360 HYDRATION IV INFUSION INIT: CPT

## 2017-06-15 PROCEDURE — 96361 HYDRATE IV INFUSION ADD-ON: CPT

## 2017-06-15 RX ORDER — METRONIDAZOLE 500 MG/1
500 TABLET ORAL 3 TIMES DAILY
Qty: 21 TABLET | Refills: 0 | Status: SHIPPED | OUTPATIENT
Start: 2017-06-15 | End: 2017-07-15

## 2017-06-15 RX ORDER — IPRATROPIUM BROMIDE AND ALBUTEROL SULFATE 2.5; .5 MG/3ML; MG/3ML
3 SOLUTION RESPIRATORY (INHALATION)
Status: CANCELLED | OUTPATIENT
Start: 2017-06-15

## 2017-06-15 RX ORDER — DIPHENHYDRAMINE HYDROCHLORIDE 50 MG/ML
25 INJECTION INTRAMUSCULAR; INTRAVENOUS
Status: CANCELLED | OUTPATIENT
Start: 2017-06-15

## 2017-06-15 RX ORDER — EPINEPHRINE 1 MG/ML
0.3 INJECTION, SOLUTION, CONCENTRATE INTRAVENOUS
Status: CANCELLED | OUTPATIENT
Start: 2017-06-15

## 2017-06-15 RX ORDER — ACETAMINOPHEN 325 MG/1
650 TABLET ORAL
Status: CANCELLED | OUTPATIENT
Start: 2017-06-15

## 2017-06-15 RX ADMIN — SODIUM CHLORIDE 2000 ML: 0.9 INJECTION, SOLUTION INTRAVENOUS at 01:06

## 2017-06-15 NOTE — PROGRESS NOTES
pt's left subclavian medi port accessed without incidence,  Pt tolerated normal saline bolus( two liters) without incidence, medi port locked with Heparin flush 500 Units/5ml, pt deaccessed, no bleeding noted to site upon removal, band aid in place

## 2017-06-15 NOTE — TELEPHONE ENCOUNTER
Not uncommon to have recurrence. Can try taking TID instead of BID for 7 days to see if better cure rate. RX sent

## 2017-06-20 ENCOUNTER — PATIENT MESSAGE (OUTPATIENT)
Dept: GASTROENTEROLOGY | Facility: CLINIC | Age: 37
End: 2017-06-20

## 2017-06-21 ENCOUNTER — TELEPHONE (OUTPATIENT)
Dept: GASTROENTEROLOGY | Facility: CLINIC | Age: 37
End: 2017-06-21

## 2017-06-21 DIAGNOSIS — K50.819 CROHN'S DISEASE OF SMALL AND LARGE INTESTINES WITH COMPLICATION: ICD-10-CM

## 2017-06-21 RX ORDER — OXYCODONE AND ACETAMINOPHEN 10; 325 MG/1; MG/1
1 TABLET ORAL EVERY 6 HOURS PRN
Qty: 30 TABLET | Refills: 0 | Status: SHIPPED | OUTPATIENT
Start: 2017-06-21 | End: 2017-07-13 | Stop reason: SDUPTHER

## 2017-06-21 NOTE — TELEPHONE ENCOUNTER
Spoke with patient.  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  She is to start Stelera but cannot until a TB test is done.  Spoke with employee health with [facility] and stated patient would have to report to them.  After results are obtained then we can move forward with the Y[place].

## 2017-06-22 ENCOUNTER — INFUSION (OUTPATIENT)
Dept: INFECTIOUS DISEASES | Facility: HOSPITAL | Age: 37
End: 2017-06-22
Attending: INTERNAL MEDICINE
Payer: COMMERCIAL

## 2017-06-22 VITALS
RESPIRATION RATE: 16 BRPM | BODY MASS INDEX: 18.69 KG/M2 | HEART RATE: 80 BPM | SYSTOLIC BLOOD PRESSURE: 115 MMHG | DIASTOLIC BLOOD PRESSURE: 72 MMHG | OXYGEN SATURATION: 99 % | HEIGHT: 61 IN | TEMPERATURE: 99 F | WEIGHT: 99 LBS

## 2017-06-22 DIAGNOSIS — K50.90 CROHN'S DISEASE: Primary | ICD-10-CM

## 2017-06-22 DIAGNOSIS — K50.019 CROHN'S DISEASE OF SMALL INTESTINE WITH COMPLICATION: ICD-10-CM

## 2017-06-22 PROCEDURE — 25000003 PHARM REV CODE 250: Performed by: INTERNAL MEDICINE

## 2017-06-22 PROCEDURE — 96361 HYDRATE IV INFUSION ADD-ON: CPT

## 2017-06-22 PROCEDURE — 96360 HYDRATION IV INFUSION INIT: CPT

## 2017-06-22 RX ORDER — DIPHENHYDRAMINE HYDROCHLORIDE 50 MG/ML
25 INJECTION INTRAMUSCULAR; INTRAVENOUS
Status: CANCELLED | OUTPATIENT
Start: 2017-07-12

## 2017-06-22 RX ORDER — IPRATROPIUM BROMIDE AND ALBUTEROL SULFATE 2.5; .5 MG/3ML; MG/3ML
3 SOLUTION RESPIRATORY (INHALATION)
Status: CANCELLED | OUTPATIENT
Start: 2017-06-22

## 2017-06-22 RX ORDER — EPINEPHRINE 1 MG/ML
0.3 INJECTION, SOLUTION, CONCENTRATE INTRAVENOUS
Status: CANCELLED | OUTPATIENT
Start: 2017-07-12

## 2017-06-22 RX ORDER — SODIUM CHLORIDE 0.9 % (FLUSH) 0.9 %
10 SYRINGE (ML) INJECTION
Status: CANCELLED | OUTPATIENT
Start: 2017-07-12

## 2017-06-22 RX ORDER — ACETAMINOPHEN 325 MG/1
650 TABLET ORAL
Status: CANCELLED | OUTPATIENT
Start: 2017-07-12

## 2017-06-22 RX ORDER — EPINEPHRINE 1 MG/ML
0.3 INJECTION, SOLUTION, CONCENTRATE INTRAVENOUS
Status: CANCELLED | OUTPATIENT
Start: 2017-06-22

## 2017-06-22 RX ORDER — IPRATROPIUM BROMIDE AND ALBUTEROL SULFATE 2.5; .5 MG/3ML; MG/3ML
3 SOLUTION RESPIRATORY (INHALATION)
Status: CANCELLED | OUTPATIENT
Start: 2017-07-12

## 2017-06-22 RX ORDER — HEPARIN 100 UNIT/ML
500 SYRINGE INTRAVENOUS
Status: CANCELLED | OUTPATIENT
Start: 2017-07-12

## 2017-06-22 RX ORDER — ACETAMINOPHEN 325 MG/1
650 TABLET ORAL
Status: CANCELLED | OUTPATIENT
Start: 2017-06-22

## 2017-06-22 RX ORDER — DIPHENHYDRAMINE HYDROCHLORIDE 50 MG/ML
25 INJECTION INTRAMUSCULAR; INTRAVENOUS
Status: CANCELLED | OUTPATIENT
Start: 2017-06-22

## 2017-06-22 RX ADMIN — SODIUM CHLORIDE 2000 ML: 0.9 INJECTION, SOLUTION INTRAVENOUS at 01:06

## 2017-06-22 NOTE — PLAN OF CARE
Problem: Patient Care Overview  Goal: Individualization & Mutuality  Outcome: Ongoing (interventions implemented as appropriate)  Pt educated on handwashing and infection control.  Pt verbalized understanding.

## 2017-06-23 ENCOUNTER — TELEPHONE (OUTPATIENT)
Dept: INFECTIOUS DISEASES | Facility: CLINIC | Age: 37
End: 2017-06-23

## 2017-06-23 RX ORDER — ALPRAZOLAM 0.5 MG/1
0.5 TABLET ORAL 2 TIMES DAILY
Qty: 60 TABLET | Refills: 0 | Status: SHIPPED | OUTPATIENT
Start: 2017-06-23 | End: 2017-07-24 | Stop reason: SDUPTHER

## 2017-06-23 RX ORDER — ZOLPIDEM TARTRATE 10 MG/1
TABLET ORAL
Qty: 30 TABLET | Refills: 0 | Status: SHIPPED | OUTPATIENT
Start: 2017-06-23 | End: 2017-07-24 | Stop reason: SDUPTHER

## 2017-06-26 ENCOUNTER — PATIENT MESSAGE (OUTPATIENT)
Dept: GASTROENTEROLOGY | Facility: CLINIC | Age: 37
End: 2017-06-26

## 2017-06-27 ENCOUNTER — PATIENT MESSAGE (OUTPATIENT)
Dept: GASTROENTEROLOGY | Facility: CLINIC | Age: 37
End: 2017-06-27

## 2017-06-28 ENCOUNTER — CLINICAL SUPPORT (OUTPATIENT)
Dept: INFECTIOUS DISEASES | Facility: CLINIC | Age: 37
End: 2017-06-28
Payer: COMMERCIAL

## 2017-06-28 ENCOUNTER — OFFICE VISIT (OUTPATIENT)
Dept: INTERNAL MEDICINE | Facility: CLINIC | Age: 37
End: 2017-06-28
Attending: FAMILY MEDICINE
Payer: COMMERCIAL

## 2017-06-28 ENCOUNTER — OFFICE VISIT (OUTPATIENT)
Dept: INFECTIOUS DISEASES | Facility: CLINIC | Age: 37
End: 2017-06-28
Payer: COMMERCIAL

## 2017-06-28 VITALS
BODY MASS INDEX: 19.32 KG/M2 | HEART RATE: 96 BPM | SYSTOLIC BLOOD PRESSURE: 110 MMHG | HEIGHT: 61 IN | DIASTOLIC BLOOD PRESSURE: 70 MMHG | TEMPERATURE: 98 F | WEIGHT: 102.31 LBS

## 2017-06-28 VITALS
WEIGHT: 109 LBS | BODY MASS INDEX: 20.58 KG/M2 | HEART RATE: 102 BPM | TEMPERATURE: 98 F | SYSTOLIC BLOOD PRESSURE: 115 MMHG | HEIGHT: 61 IN | DIASTOLIC BLOOD PRESSURE: 81 MMHG

## 2017-06-28 DIAGNOSIS — I10 ESSENTIAL HYPERTENSION, BENIGN: ICD-10-CM

## 2017-06-28 DIAGNOSIS — N10 ACUTE PYELONEPHRITIS: Primary | ICD-10-CM

## 2017-06-28 DIAGNOSIS — K50.019 CROHN'S DISEASE OF SMALL INTESTINE WITH COMPLICATION: ICD-10-CM

## 2017-06-28 DIAGNOSIS — K50.919 CROHN'S DISEASE WITH COMPLICATION, UNSPECIFIED GASTROINTESTINAL TRACT LOCATION: Primary | ICD-10-CM

## 2017-06-28 PROCEDURE — 90471 IMMUNIZATION ADMIN: CPT | Mod: S$GLB,,, | Performed by: INTERNAL MEDICINE

## 2017-06-28 PROCEDURE — 90670 PCV13 VACCINE IM: CPT | Mod: S$GLB,,, | Performed by: INTERNAL MEDICINE

## 2017-06-28 PROCEDURE — 99999 PR PBB SHADOW E&M-EST. PATIENT-LVL III: CPT | Mod: PBBFAC,,, | Performed by: INTERNAL MEDICINE

## 2017-06-28 PROCEDURE — 96372 THER/PROPH/DIAG INJ SC/IM: CPT | Mod: S$GLB,,, | Performed by: FAMILY MEDICINE

## 2017-06-28 PROCEDURE — 99215 OFFICE O/P EST HI 40 MIN: CPT | Mod: 25,S$GLB,, | Performed by: FAMILY MEDICINE

## 2017-06-28 PROCEDURE — 99999 PR PBB SHADOW E&M-EST. PATIENT-LVL III: CPT | Mod: PBBFAC,,, | Performed by: FAMILY MEDICINE

## 2017-06-28 PROCEDURE — 99205 OFFICE O/P NEW HI 60 MIN: CPT | Mod: 25,S$GLB,, | Performed by: INTERNAL MEDICINE

## 2017-06-28 PROCEDURE — 87086 URINE CULTURE/COLONY COUNT: CPT

## 2017-06-28 RX ORDER — CIPROFLOXACIN 500 MG/1
500 TABLET ORAL 2 TIMES DAILY
Qty: 20 TABLET | Refills: 0 | Status: SHIPPED | OUTPATIENT
Start: 2017-06-28 | End: 2017-07-08

## 2017-06-28 RX ORDER — CEFTRIAXONE 1 G/1
1 INJECTION, POWDER, FOR SOLUTION INTRAMUSCULAR; INTRAVENOUS
Status: COMPLETED | OUTPATIENT
Start: 2017-06-28 | End: 2017-06-28

## 2017-06-28 RX ORDER — PHENAZOPYRIDINE HYDROCHLORIDE 200 MG/1
200 TABLET, FILM COATED ORAL 3 TIMES DAILY PRN
Qty: 9 TABLET | Refills: 0 | Status: SHIPPED | OUTPATIENT
Start: 2017-06-28 | End: 2017-07-01

## 2017-06-28 RX ADMIN — CEFTRIAXONE 1 G: 1 INJECTION, POWDER, FOR SOLUTION INTRAMUSCULAR; INTRAVENOUS at 02:06

## 2017-06-28 NOTE — PROGRESS NOTES
Patient given Ceftriaxone IM in the Left Upper Glute. Patient tolerated well and Band-Aid was applied. Lot#[id number] Exp:07/1/2019. Patient advised to wait in the lobby for 15 min to make sure no adverse reactions occur. Patient states verbal understanding and has no further questions.        Reconstituted with  Xylocaine 1% lot # [id number] Exp: 08/01/2020

## 2017-06-28 NOTE — PROGRESS NOTES
Subjective:      Patient ID: Ivy Salgado is a 37 y.o. female.    Chief Complaint:Other (possible exposure to TB)      History of Present Illness  HPI     Other    Additional comments: possible exposure to TB       Last edited by Jackie Monzon MA on 6/28/2017  8:52 AM. (History)      Here today for concern about exposure to a patient with possible TB.  She has a h/o Crohn's since she was 11yo. Underwent surgery 5 years ago for colectomy and rectum removed. She was doing well and in remission until April 2017. She follow with Dr. Ross and was considering starting Stelera.  She is a nurse and works in the ED.  While working there, a patient came in with what was thought to be hematemesis but ended up actually being hemoptysis. Source patient's CXR concerning for TB and she cared for him prior to him being isolated.  Her prior tests negative for TB.      Review of Systems   Constitution: Positive for chills, decreased appetite, fever, weakness, malaise/fatigue and weight gain. Negative for night sweats and weight loss.   HENT: Positive for ear pain, headaches and hearing loss. Negative for congestion, hoarse voice, sore throat and tinnitus.    Eyes: Positive for blurred vision. Negative for redness and visual disturbance.   Cardiovascular: Negative for chest pain, leg swelling and palpitations.   Respiratory: Positive for cough. Negative for hemoptysis, shortness of breath and sputum production.    Hematologic/Lymphatic: Negative for adenopathy. Bruises/bleeds easily.   Skin: Negative for dry skin, itching, rash and suspicious lesions.   Musculoskeletal: Positive for back pain. Negative for joint pain, myalgias and neck pain.   Gastrointestinal: Positive for abdominal pain, diarrhea, heartburn and nausea. Negative for constipation and vomiting.   Genitourinary: Positive for flank pain and frequency. Negative for dysuria, hematuria, hesitancy and urgency.   Neurological: Positive for dizziness. Negative for  numbness and paresthesias.   Psychiatric/Behavioral: Negative for depression and memory loss. The patient does not have insomnia and is not nervous/anxious.      Objective:   Physical Exam   Constitutional: She is oriented to person, place, and time. She appears well-developed and well-nourished. She is cooperative.  Non-toxic appearance. No distress.   Looks fatigued   HENT:   Head: Normocephalic and atraumatic.   Right Ear: Hearing and external ear normal.   Left Ear: Hearing and external ear normal.   Nose: Nose normal.   Mouth/Throat: Oropharynx is clear and moist.   Eyes: Conjunctivae, EOM and lids are normal. Pupils are equal, round, and reactive to light. Right eye exhibits no discharge. Left eye exhibits no discharge. Right conjunctiva is not injected. Left conjunctiva is not injected. No scleral icterus.   Neck: Normal range of motion. No tracheal deviation present.   Cardiovascular: Normal rate.    Pulmonary/Chest: Effort normal. No accessory muscle usage or stridor. No respiratory distress. She has no wheezes.   Port left chest   Abdominal: Normal appearance.   Ostomy in place   Musculoskeletal: Normal range of motion. She exhibits no edema.   Neurological: She is alert and oriented to person, place, and time. Coordination normal.   Skin: Skin is warm and dry. No rash noted. She is not diaphoretic. No erythema.   Psychiatric: She has a normal mood and affect. Her speech is normal and behavior is normal. Judgment and thought content normal. Cognition and memory are normal.   Nursing note and vitals reviewed.    Assessment:       1. Crohn's disease with complication, unspecified gastrointestinal tract location        Possible exposure to TB.  Plan:       Ivy was seen today for other.    Diagnoses and all orders for this visit:    Crohn's disease with complication, unspecified gastrointestinal tract location  -     Hepatitis B surface antibody  -     Comprehensive metabolic panel  -     Quantiferon Gold  TB  -     Comprehensive metabolic panel; Future  -     Hepatitis B surface antibody; Future  -     Quantiferon Gold TB; Future    Other orders  -     Pneumococcal Conjugate Vaccine (13 Valent) (IM); Future       Reviewed source patient's chart and cultures. Also called micro lab. Source Patients B PCR was negative but AFB culture is growing. Suspect atypical mycobacteria which is not contagious but will know more next week. Will do phone review next week to see of we have more definitive information.  Will check quantiferon today.  She has had hepatitis B series.  Reviewed immunizations with her and needs prevnar.  Will order for today.  If culture negative for TB ok to proceed with Stelera.  If positive for TB will start INH.  Counseled patient.

## 2017-06-28 NOTE — PROGRESS NOTES
CHIEF COMPLAINT: Several weeks, dysuria, frequency, incomplete voiding sensation    HISTORY OF PRESENT ILLNESS: The patient presents with several days of dysuria, frequency, and a sensation of incomplete voiding.  There has been no hematuria.  The patient has a history of UTIs.  The patient feels this is a UTI.  The patient has not noticed any vesicular outbreak.  There is no labial pain, which might be consistent with HSV infection.  Patient has low back pain, fevers, chills, nausea but no vomiting.  Patient has limited p.o. Intake.  She is an RN in the ER.    She has a long complicated history of Crohn's disease with multiple complications.  She has already arranged for a 2 L normal saline infusion to be done tomorrow.  She would like to try to treat this pyelonephritis as an outpatient.    REVIEW OF SYSTEMS:  GENERAL: No fatigability or weight loss.  SKIN: No rashes, itching or changes in color or texture of skin.  HEAD: No headaches or recent head trauma.  EYES: Visual acuity fine. No photophobia, ocular pain or diplopia.  EARS: Denies ear pain, discharge or vertigo.  NOSE: No loss of smell, no epistaxis or postnasal drip.  MOUTH & THROAT: No hoarseness or change in voice. No excessive gum bleeding.  NODES: Denies swollen glands.  CHEST: Denies B[place], cyanosis, wheezing, cough and sputum production.  CARDIOVASCULAR: Denies chest pain, PND, orthopnea or reduced exercise tolerance.  ABDOMEN:  No weight loss. Denies diarrhea, abdominal pain, hematemesis or blood in stool.  URINARY: She has both flank pain and hematuria.  PERIPHERAL VASCULAR: No claudication or cyanosis.  MUSCULOSKELETAL: No joint stiffness or swelling.   NEUROLOGIC: No history of seizures, paralysis, alteration of gait or coordination.    PHYSICAL EXAMINATION:       APPEARANCE: Well nourished, well developed, in no acute distress.    HEAD: Normocephalic, atraumatic.  EYES: PERRL. EOMI.  Conjunctivae without injection and  anicteric  EARS: TM's  intact. Light reflex normal. No retraction or perforation.    NOSE: Mucosa pink. Airway clear.  MOUTH & THROAT: No tonsillar enlargement. No pharyngeal erythema or exudate. No stridor.  NECK: Supple.   NODES: No cervical, axillary or inguinal lymph node enlargement.  ABDOMEN: Bowel sounds normal. Not distended. Soft. No tenderness or masses.  No ascites is noted.  MUSCULOSKELETAL:  There is no clubbing, cyanosis, or edema of the extremities x4.  There is full range of motion of the lumbar spine.  There is full range of motion of the extremities x4.  There is no deformity noted.    NEUROLOGIC:       Normal speech development.      Hearing normal.      Normal gait.      Motor and sensory exams grossly normal.      DTR's normal.  PSYCHIATRIC: Patient is alert and oriented x3.  Thought processes are all normal.  There is no homicidality.  There is no suicidality.  There is no evidence of psychosis.    LABORATORY/RADIOLOGY: Chart reviewed.    ASSESSMENT:   Early pyelonephritis  Complicated inflammatory bowel disease  Dehydration  Nausea without vomiting    PLAN:  We discussed that outpatient management of her condition has some risks she is anxious to avoid inpatient status.  We will treat her aggressively and she will do well I believe.  She will receive 2 liters of fluid tomorrow.  She will follow-up in 2 days with her PCP in the clinic here  Urine culture sent  1 g of Rocephin given IM in clinic  Cipro and elidia as per med card  Patient will proceed to the emergency room if she develops fevers, chills, nausea, vomiting, or back pain.

## 2017-06-29 ENCOUNTER — PATIENT MESSAGE (OUTPATIENT)
Dept: INTERNAL MEDICINE | Facility: CLINIC | Age: 37
End: 2017-06-29

## 2017-06-29 ENCOUNTER — LAB VISIT (OUTPATIENT)
Dept: LAB | Facility: HOSPITAL | Age: 37
End: 2017-06-29
Attending: INTERNAL MEDICINE
Payer: COMMERCIAL

## 2017-06-29 ENCOUNTER — INFUSION (OUTPATIENT)
Dept: INFECTIOUS DISEASES | Facility: HOSPITAL | Age: 37
End: 2017-06-29
Attending: INTERNAL MEDICINE
Payer: COMMERCIAL

## 2017-06-29 VITALS
HEART RATE: 83 BPM | DIASTOLIC BLOOD PRESSURE: 71 MMHG | TEMPERATURE: 99 F | WEIGHT: 102 LBS | BODY MASS INDEX: 19.26 KG/M2 | HEIGHT: 61 IN | SYSTOLIC BLOOD PRESSURE: 110 MMHG

## 2017-06-29 DIAGNOSIS — K50.919 CROHN'S DISEASE WITH COMPLICATION, UNSPECIFIED GASTROINTESTINAL TRACT LOCATION: ICD-10-CM

## 2017-06-29 DIAGNOSIS — K50.819 CROHN'S DISEASE OF SMALL AND LARGE INTESTINES WITH COMPLICATION: Primary | ICD-10-CM

## 2017-06-29 LAB
ALBUMIN SERPL BCP-MCNC: 3.7 G/DL
ALP SERPL-CCNC: 57 U/L
ALT SERPL W/O P-5'-P-CCNC: 14 U/L
ANION GAP SERPL CALC-SCNC: 10 MMOL/L
AST SERPL-CCNC: 24 U/L
BACTERIA UR CULT: NO GROWTH
BILIRUB SERPL-MCNC: 0.6 MG/DL
BUN SERPL-MCNC: 16 MG/DL
CALCIUM SERPL-MCNC: 9.6 MG/DL
CHLORIDE SERPL-SCNC: 105 MMOL/L
CO2 SERPL-SCNC: 20 MMOL/L
CREAT SERPL-MCNC: 0.8 MG/DL
EST. GFR  (AFRICAN AMERICAN): >60 ML/MIN/1.73 M^2
EST. GFR  (NON AFRICAN AMERICAN): >60 ML/MIN/1.73 M^2
GLUCOSE SERPL-MCNC: 86 MG/DL
HBV SURFACE AB SER-ACNC: ABNORMAL M[IU]/ML
POTASSIUM SERPL-SCNC: 3.7 MMOL/L
PROT SERPL-MCNC: 8 G/DL
SODIUM SERPL-SCNC: 135 MMOL/L

## 2017-06-29 PROCEDURE — 80053 COMPREHEN METABOLIC PANEL: CPT

## 2017-06-29 PROCEDURE — 96360 HYDRATION IV INFUSION INIT: CPT

## 2017-06-29 PROCEDURE — 96523 IRRIG DRUG DELIVERY DEVICE: CPT

## 2017-06-29 PROCEDURE — 86706 HEP B SURFACE ANTIBODY: CPT

## 2017-06-29 PROCEDURE — 96361 HYDRATE IV INFUSION ADD-ON: CPT

## 2017-06-29 PROCEDURE — 25000003 PHARM REV CODE 250: Performed by: INTERNAL MEDICINE

## 2017-06-29 PROCEDURE — 36415 COLL VENOUS BLD VENIPUNCTURE: CPT

## 2017-06-29 RX ORDER — DIPHENHYDRAMINE HYDROCHLORIDE 50 MG/ML
25 INJECTION INTRAMUSCULAR; INTRAVENOUS
Status: CANCELLED | OUTPATIENT
Start: 2017-07-12

## 2017-06-29 RX ORDER — HEPARIN 100 UNIT/ML
500 SYRINGE INTRAVENOUS
Status: CANCELLED | OUTPATIENT
Start: 2017-07-12

## 2017-06-29 RX ORDER — SODIUM CHLORIDE 0.9 % (FLUSH) 0.9 %
10 SYRINGE (ML) INJECTION
Status: CANCELLED | OUTPATIENT
Start: 2017-07-12

## 2017-06-29 RX ORDER — IPRATROPIUM BROMIDE AND ALBUTEROL SULFATE 2.5; .5 MG/3ML; MG/3ML
3 SOLUTION RESPIRATORY (INHALATION)
Status: CANCELLED | OUTPATIENT
Start: 2017-07-12

## 2017-06-29 RX ORDER — ACETAMINOPHEN 325 MG/1
650 TABLET ORAL
Status: CANCELLED | OUTPATIENT
Start: 2017-07-12

## 2017-06-29 RX ORDER — EPINEPHRINE 1 MG/ML
0.3 INJECTION, SOLUTION, CONCENTRATE INTRAVENOUS
Status: CANCELLED | OUTPATIENT
Start: 2017-07-12

## 2017-06-29 RX ADMIN — SODIUM CHLORIDE 2000 ML: 0.9 INJECTION, SOLUTION INTRAVENOUS at 02:06

## 2017-06-29 NOTE — TELEPHONE ENCOUNTER
Scott.  I am sure they will help.  I hope you are feeling better by the time you see Dr. Astorga in the morning.

## 2017-06-30 ENCOUNTER — HOSPITAL ENCOUNTER (OUTPATIENT)
Dept: RADIOLOGY | Facility: OTHER | Age: 37
Discharge: HOME | End: 2017-06-30
Attending: INTERNAL MEDICINE
Payer: COMMERCIAL

## 2017-06-30 ENCOUNTER — TELEPHONE (OUTPATIENT)
Dept: INTERNAL MEDICINE | Facility: CLINIC | Age: 37
End: 2017-06-30

## 2017-06-30 ENCOUNTER — OFFICE VISIT (OUTPATIENT)
Dept: INTERNAL MEDICINE | Facility: CLINIC | Age: 37
End: 2017-06-30
Payer: COMMERCIAL

## 2017-06-30 VITALS
SYSTOLIC BLOOD PRESSURE: 124 MMHG | DIASTOLIC BLOOD PRESSURE: 76 MMHG | WEIGHT: 103.63 LBS | OXYGEN SATURATION: 98 % | HEART RATE: 97 BPM | BODY MASS INDEX: 19.57 KG/M2 | HEIGHT: 61 IN

## 2017-06-30 DIAGNOSIS — R31.9 HEMATURIA: ICD-10-CM

## 2017-06-30 DIAGNOSIS — K50.819 CROHN'S DISEASE OF SMALL AND LARGE INTESTINES WITH COMPLICATION: Chronic | ICD-10-CM

## 2017-06-30 DIAGNOSIS — R10.9 RIGHT FLANK PAIN: ICD-10-CM

## 2017-06-30 DIAGNOSIS — R10.9 RIGHT FLANK PAIN: Primary | ICD-10-CM

## 2017-06-30 LAB
BACTERIA #/AREA URNS HPF: ABNORMAL /HPF
BILIRUB UR QL STRIP: NEGATIVE
CLARITY UR: CLEAR
COLOR UR: ABNORMAL
GLUCOSE UR QL STRIP: ABNORMAL
HGB UR QL STRIP: NEGATIVE
KETONES UR QL STRIP: NEGATIVE
LEUKOCYTE ESTERASE UR QL STRIP: NEGATIVE
MICROSCOPIC COMMENT: ABNORMAL
NITRITE UR QL STRIP: POSITIVE
PH UR STRIP: 5 [PH] (ref 5–8)
PROT UR QL STRIP: ABNORMAL
RBC #/AREA URNS HPF: 2 /HPF (ref 0–4)
SP GR UR STRIP: >=1.03 (ref 1–1.03)
SQUAMOUS #/AREA URNS HPF: 6 /HPF
URN SPEC COLLECT METH UR: ABNORMAL
UROBILINOGEN UR STRIP-ACNC: 1 EU/DL
WBC #/AREA URNS HPF: 5 /HPF (ref 0–5)
YEAST URNS QL MICRO: ABNORMAL

## 2017-06-30 PROCEDURE — 74177 CT ABD & PELVIS W/CONTRAST: CPT | Mod: TC

## 2017-06-30 PROCEDURE — 99999 PR PBB SHADOW E&M-EST. PATIENT-LVL III: CPT | Mod: PBBFAC,,, | Performed by: INTERNAL MEDICINE

## 2017-06-30 PROCEDURE — 25500020 PHARM REV CODE 255: Performed by: INTERNAL MEDICINE

## 2017-06-30 PROCEDURE — 74177 CT ABD & PELVIS W/CONTRAST: CPT | Mod: 26,,, | Performed by: RADIOLOGY

## 2017-06-30 PROCEDURE — 99214 OFFICE O/P EST MOD 30 MIN: CPT | Mod: S$GLB,,, | Performed by: INTERNAL MEDICINE

## 2017-06-30 PROCEDURE — 81000 URINALYSIS NONAUTO W/SCOPE: CPT

## 2017-06-30 RX ORDER — SUMATRIPTAN SUCCINATE 100 MG/1
100 TABLET ORAL
Qty: 9 TABLET | Refills: 1 | Status: SHIPPED | OUTPATIENT
Start: 2017-06-30 | End: 2017-10-09 | Stop reason: SDUPTHER

## 2017-06-30 RX ADMIN — IOHEXOL 75 ML: 350 INJECTION, SOLUTION INTRAVENOUS at 01:06

## 2017-06-30 RX ADMIN — IOHEXOL 25 ML: 350 INJECTION, SOLUTION INTRAVENOUS at 01:06

## 2017-06-30 NOTE — TELEPHONE ENCOUNTER
----- Message from Ladonna Lynn sent at 6/30/2017 12:24 PM CDT -----  Contact: Pamella with [facility]  X  1st Request  _  2nd Request  _  3rd Request        Who: Pamella with [facility]    Why: Pamella states the pt is currently at the Imaging center and she is unable to accept a verbal authorization for a Power port for the pt. Pamella states she needs a faxed written order or a change made to the order in the system. Pamella states it is urgent. Please call with any questions, thanks!    What Number to Call Back: [phone]    When to Expect a call back: (With in 24 hours)

## 2017-06-30 NOTE — TELEPHONE ENCOUNTER
Spoke with danielito again with CT scan, advised again ok per Dr. Astorga to use patients power port for contrast

## 2017-06-30 NOTE — PROGRESS NOTES
Subjective:       Patient ID: Ivy Salgado is a 37 y.o. female.    Chief Complaint: Cystitis    Pt c/o 2 wks of R flank and suprapubic pain. Feels like past UTIs she has had. She has fever to 101.5 with dysuria, frequency and occasional hematuria. She had UTI 6 weeks ago and was rx macrobid but didn't finish it after she started feeling better. She has had some nausea but no vomiting. No vaginal symptoms and has had hysterectomy. She has crohn's disease has had flare since February. Last dose of cimzia was 1 month ago. Her ostomy output is liquid. Pt had 2L NS infusion yesterday without improvement in her symptoms. Urine culture was done with negative results 2 days ago but she admits she had a dose of cipro the night before. She has since been on cipro 500mg bid for the last 2 days. She also had rocephin 1gm in clinic 2 days ago.       Review of Systems   Constitutional: Positive for fever.   Respiratory: Negative for shortness of breath.    Cardiovascular: Negative for chest pain.   Gastrointestinal: Positive for abdominal pain, diarrhea and nausea. Negative for blood in stool and vomiting.   Genitourinary: Positive for dysuria and frequency.       Objective:      Physical Exam   Constitutional: She is oriented to person, place, and time. She appears well-developed and well-nourished.   Cardiovascular: Normal rate, regular rhythm and normal heart sounds.    Pulmonary/Chest: Effort normal and breath sounds normal.   Abdominal: Soft. Bowel sounds are normal. She exhibits no mass. There is tenderness. There is no guarding.   Ostomy site ok; mild tenderness suprapubic region as well as R flank tenderness but no other areas of abdomen with pain   Neurological: She is alert and oriented to person, place, and time.   Psychiatric: She has a normal mood and affect. Her behavior is normal.       Assessment:       1. Right flank pain    2. Hematuria    3. Crohn's disease of small and large intestines with complication         Plan:       1. UA--dip in office inconclusive as pt had pyridium last night; send for micro analysis  2. CT abd/pelv r/o abscess related to crohn's  3. ED prompts d/w pt and mother and they understood

## 2017-06-30 NOTE — TELEPHONE ENCOUNTER
----- Message from Jm Delacruz sent at 6/30/2017 11:52 AM CDT -----  Contact: Pamella ANAND  Patient is currently waiting and staff would like to know decision for CT scan to start at 12  ----- Message -----  From: Jm Delacruz  Sent: 6/30/2017  11:34 AM  To: Gauri Hernandez Staff    X_ 1st Request  _ 2nd Request  _ 3rd Request    Who: Pamella CT    Why: Would like to know if Dr. Astorga would like to put in order so they can use the patient power port to be her scan.     What Number to Call Back: [phone]    When to Expect a call back: (Before the end of the day)  -- if call after 3:00 call back will be tomorrow.

## 2017-06-30 NOTE — TELEPHONE ENCOUNTER
Spoke with pt re: CT results. No concerning findings. Awaiting micro UA. In meantime, finish cipro--she has 7 days total. Also advised to contact GI as she reports that past crohn's flares have not been evident on CT but only on MRI. She has contact info but will let me know if she has trouble contacting them. Reiterated ED prompts and she understood.

## 2017-06-30 NOTE — TELEPHONE ENCOUNTER
Spoke with pt re: UA results--may be skewed by pyridium night before. Nitrite positive but questionable due to above. Continue cipro but ED if not feeling better or if worsening.

## 2017-07-04 ENCOUNTER — PATIENT MESSAGE (OUTPATIENT)
Dept: GASTROENTEROLOGY | Facility: CLINIC | Age: 37
End: 2017-07-04

## 2017-07-05 ENCOUNTER — TELEPHONE (OUTPATIENT)
Dept: INFECTIOUS DISEASES | Facility: CLINIC | Age: 37
End: 2017-07-05

## 2017-07-05 NOTE — TELEPHONE ENCOUNTER
Jackie, can you find out the name from the source patient?  I need to call the micro lab to see if additional information is available.  Thank you!

## 2017-07-05 NOTE — TELEPHONE ENCOUNTER
Called micro lab regarding source patient's culture.  It is growing an atypical mycobacteria and not TB.  This is not contagious and so Ms. Salgado does not need any further follow up regarding this. She can start treatment for Crohn's from my standpoint.  She will need yearly quantiferon testing.

## 2017-07-10 ENCOUNTER — PATIENT MESSAGE (OUTPATIENT)
Dept: WOUND CARE | Facility: CLINIC | Age: 37
End: 2017-07-10

## 2017-07-10 ENCOUNTER — PATIENT MESSAGE (OUTPATIENT)
Dept: INTERNAL MEDICINE | Facility: CLINIC | Age: 37
End: 2017-07-10

## 2017-07-10 DIAGNOSIS — N39.0 URINARY TRACT INFECTION, SITE NOT SPECIFIED: Primary | ICD-10-CM

## 2017-07-10 RX ORDER — FLUCONAZOLE 100 MG/1
100 TABLET ORAL DAILY
Qty: 7 TABLET | Refills: 0 | Status: SHIPPED | OUTPATIENT
Start: 2017-07-10 | End: 2017-09-18 | Stop reason: SDUPTHER

## 2017-07-11 ENCOUNTER — INFUSION (OUTPATIENT)
Dept: INFECTIOUS DISEASES | Facility: HOSPITAL | Age: 37
End: 2017-07-11
Attending: INTERNAL MEDICINE
Payer: COMMERCIAL

## 2017-07-11 VITALS
RESPIRATION RATE: 15 BRPM | BODY MASS INDEX: 19.45 KG/M2 | OXYGEN SATURATION: 98 % | TEMPERATURE: 99 F | HEART RATE: 74 BPM | WEIGHT: 103 LBS | DIASTOLIC BLOOD PRESSURE: 75 MMHG | SYSTOLIC BLOOD PRESSURE: 118 MMHG | HEIGHT: 61 IN

## 2017-07-11 DIAGNOSIS — K50.819 CROHN'S DISEASE OF SMALL AND LARGE INTESTINES WITH COMPLICATION: Primary | ICD-10-CM

## 2017-07-11 PROCEDURE — 96413 CHEMO IV INFUSION 1 HR: CPT

## 2017-07-11 PROCEDURE — 96365 THER/PROPH/DIAG IV INF INIT: CPT

## 2017-07-11 PROCEDURE — 96361 HYDRATE IV INFUSION ADD-ON: CPT

## 2017-07-11 PROCEDURE — 63600175 PHARM REV CODE 636 W HCPCS: Performed by: INTERNAL MEDICINE

## 2017-07-11 PROCEDURE — 25000003 PHARM REV CODE 250: Performed by: INTERNAL MEDICINE

## 2017-07-11 PROCEDURE — 96360 HYDRATION IV INFUSION INIT: CPT

## 2017-07-11 RX ORDER — HEPARIN 100 UNIT/ML
500 SYRINGE INTRAVENOUS
Status: CANCELLED | OUTPATIENT
Start: 2017-07-11

## 2017-07-11 RX ORDER — DIPHENHYDRAMINE HYDROCHLORIDE 50 MG/ML
25 INJECTION INTRAMUSCULAR; INTRAVENOUS
Status: CANCELLED | OUTPATIENT
Start: 2017-07-11

## 2017-07-11 RX ORDER — SODIUM CHLORIDE 0.9 % (FLUSH) 0.9 %
10 SYRINGE (ML) INJECTION
Status: DISCONTINUED | OUTPATIENT
Start: 2017-07-11 | End: 2017-07-11 | Stop reason: HOSPADM

## 2017-07-11 RX ORDER — EPINEPHRINE 1 MG/ML
0.3 INJECTION, SOLUTION, CONCENTRATE INTRAVENOUS
Status: CANCELLED | OUTPATIENT
Start: 2017-07-11

## 2017-07-11 RX ORDER — IPRATROPIUM BROMIDE AND ALBUTEROL SULFATE 2.5; .5 MG/3ML; MG/3ML
3 SOLUTION RESPIRATORY (INHALATION)
Status: CANCELLED | OUTPATIENT
Start: 2017-07-11

## 2017-07-11 RX ORDER — ACETAMINOPHEN 325 MG/1
650 TABLET ORAL
Status: CANCELLED | OUTPATIENT
Start: 2017-07-11

## 2017-07-11 RX ORDER — SODIUM CHLORIDE 0.9 % (FLUSH) 0.9 %
10 SYRINGE (ML) INJECTION
Status: CANCELLED | OUTPATIENT
Start: 2017-07-11

## 2017-07-11 RX ADMIN — SODIUM CHLORIDE 2000 ML: 0.9 INJECTION, SOLUTION INTRAVENOUS at 01:07

## 2017-07-11 RX ADMIN — USTEKINUMAB 260 MG: 130 SOLUTION INTRAVENOUS at 03:07

## 2017-07-11 NOTE — PROGRESS NOTES
pt's left subclavian medi port accessed without incidence,  Pt tolerated normal saline bolus( two liters) without incidence, first dose of Stelara 260 mg, tolerated without any difficulty,  medi port locked with Heparin flush 500 Units/5ml, pt deaccessed, no bleeding noted to site upon removal, band aid in place

## 2017-07-12 ENCOUNTER — PATIENT MESSAGE (OUTPATIENT)
Dept: INTERNAL MEDICINE | Facility: CLINIC | Age: 37
End: 2017-07-12

## 2017-07-12 ENCOUNTER — LAB VISIT (OUTPATIENT)
Dept: LAB | Facility: HOSPITAL | Age: 37
End: 2017-07-12
Attending: INTERNAL MEDICINE
Payer: COMMERCIAL

## 2017-07-12 DIAGNOSIS — K50.819 CROHN'S DISEASE OF SMALL AND LARGE INTESTINES WITH COMPLICATION: ICD-10-CM

## 2017-07-12 DIAGNOSIS — N39.0 URINARY TRACT INFECTION, SITE NOT SPECIFIED: ICD-10-CM

## 2017-07-12 LAB
BACTERIA #/AREA URNS AUTO: ABNORMAL /HPF
BILIRUB UR QL STRIP: NEGATIVE
CLARITY UR REFRACT.AUTO: ABNORMAL
COLOR UR AUTO: YELLOW
GLUCOSE UR QL STRIP: NEGATIVE
HGB UR QL STRIP: ABNORMAL
KETONES UR QL STRIP: NEGATIVE
LEUKOCYTE ESTERASE UR QL STRIP: ABNORMAL
MICROSCOPIC COMMENT: ABNORMAL
NITRITE UR QL STRIP: NEGATIVE
PH UR STRIP: 5 [PH] (ref 5–8)
PROT UR QL STRIP: NEGATIVE
RBC #/AREA URNS AUTO: 3 /HPF (ref 0–4)
SP GR UR STRIP: 1.01 (ref 1–1.03)
SQUAMOUS #/AREA URNS AUTO: 2 /HPF
URN SPEC COLLECT METH UR: ABNORMAL
UROBILINOGEN UR STRIP-ACNC: NEGATIVE EU/DL
WBC #/AREA URNS AUTO: >100 /HPF (ref 0–5)

## 2017-07-12 PROCEDURE — 87077 CULTURE AEROBIC IDENTIFY: CPT

## 2017-07-12 PROCEDURE — 87086 URINE CULTURE/COLONY COUNT: CPT

## 2017-07-12 PROCEDURE — 81001 URINALYSIS AUTO W/SCOPE: CPT

## 2017-07-12 PROCEDURE — 87186 SC STD MICRODIL/AGAR DIL: CPT

## 2017-07-12 PROCEDURE — 87088 URINE BACTERIA CULTURE: CPT

## 2017-07-13 ENCOUNTER — PATIENT MESSAGE (OUTPATIENT)
Dept: INTERNAL MEDICINE | Facility: CLINIC | Age: 37
End: 2017-07-13

## 2017-07-13 RX ORDER — ONDANSETRON 8 MG/1
8 TABLET, ORALLY DISINTEGRATING ORAL EVERY 8 HOURS PRN
Qty: 30 TABLET | Refills: 0 | Status: SHIPPED | OUTPATIENT
Start: 2017-07-13 | End: 2017-08-02 | Stop reason: SDUPTHER

## 2017-07-13 RX ORDER — OXYCODONE AND ACETAMINOPHEN 10; 325 MG/1; MG/1
1 TABLET ORAL EVERY 6 HOURS PRN
Qty: 30 TABLET | Refills: 0 | Status: SHIPPED | OUTPATIENT
Start: 2017-07-13 | End: 2017-08-02 | Stop reason: SDUPTHER

## 2017-07-14 LAB — BACTERIA UR CULT: NORMAL

## 2017-07-15 ENCOUNTER — PATIENT MESSAGE (OUTPATIENT)
Dept: INTERNAL MEDICINE | Facility: CLINIC | Age: 37
End: 2017-07-15

## 2017-07-15 RX ORDER — AMOXICILLIN AND CLAVULANATE POTASSIUM 875; 125 MG/1; MG/1
1 TABLET, FILM COATED ORAL EVERY 12 HOURS
Qty: 20 TABLET | Refills: 0 | Status: SHIPPED | OUTPATIENT
Start: 2017-07-15 | End: 2017-08-22

## 2017-07-21 ENCOUNTER — TELEPHONE (OUTPATIENT)
Dept: PHARMACY | Facility: CLINIC | Age: 37
End: 2017-07-21

## 2017-07-21 NOTE — TELEPHONE ENCOUNTER
Good afternoon,    The new Rx we received for Ms. Aarti Gray was sent with the psoriasis dosing. Can your office please forward a new Rx with the correct maintenance dosing (90 mg Q 8 weeks)?     Thanks,   Puja Dunbar, PharmD  [facility]

## 2017-07-24 ENCOUNTER — PATIENT MESSAGE (OUTPATIENT)
Dept: GASTROENTEROLOGY | Facility: CLINIC | Age: 37
End: 2017-07-24

## 2017-07-24 ENCOUNTER — TELEPHONE (OUTPATIENT)
Dept: PHARMACY | Facility: CLINIC | Age: 37
End: 2017-07-24

## 2017-07-24 DIAGNOSIS — K50.819 CROHN'S DISEASE OF SMALL AND LARGE INTESTINES WITH COMPLICATION: Primary | ICD-10-CM

## 2017-07-24 RX ORDER — ALPRAZOLAM 0.5 MG/1
0.5 TABLET ORAL 2 TIMES DAILY
Qty: 60 TABLET | Refills: 0 | Status: SHIPPED | OUTPATIENT
Start: 2017-07-24 | End: 2017-08-24 | Stop reason: SDUPTHER

## 2017-07-24 RX ORDER — ZOLPIDEM TARTRATE 10 MG/1
TABLET ORAL
Qty: 30 TABLET | Refills: 0 | Status: SHIPPED | OUTPATIENT
Start: 2017-07-24 | End: 2017-08-24 | Stop reason: SDUPTHER

## 2017-07-25 ENCOUNTER — INFUSION (OUTPATIENT)
Dept: INFECTIOUS DISEASES | Facility: HOSPITAL | Age: 37
End: 2017-07-25
Attending: INTERNAL MEDICINE
Payer: COMMERCIAL

## 2017-07-25 ENCOUNTER — PATIENT MESSAGE (OUTPATIENT)
Dept: GASTROENTEROLOGY | Facility: CLINIC | Age: 37
End: 2017-07-25

## 2017-07-25 VITALS
OXYGEN SATURATION: 100 % | HEART RATE: 73 BPM | SYSTOLIC BLOOD PRESSURE: 143 MMHG | HEIGHT: 61 IN | TEMPERATURE: 99 F | RESPIRATION RATE: 16 BRPM | DIASTOLIC BLOOD PRESSURE: 77 MMHG | WEIGHT: 94 LBS | BODY MASS INDEX: 17.75 KG/M2

## 2017-07-25 DIAGNOSIS — R19.7 ACUTE DIARRHEA: Primary | ICD-10-CM

## 2017-07-25 DIAGNOSIS — K50.819 CROHN'S DISEASE OF SMALL AND LARGE INTESTINES WITH COMPLICATION: Primary | ICD-10-CM

## 2017-07-25 LAB
ALBUMIN SERPL BCP-MCNC: 3.6 G/DL
ALP SERPL-CCNC: 52 U/L
ALT SERPL W/O P-5'-P-CCNC: 15 U/L
ANION GAP SERPL CALC-SCNC: 8 MMOL/L
AST SERPL-CCNC: 17 U/L
BASOPHILS # BLD AUTO: 0.02 K/UL
BASOPHILS NFR BLD: 0.3 %
BILIRUB SERPL-MCNC: 0.6 MG/DL
BUN SERPL-MCNC: 8 MG/DL
CALCIUM SERPL-MCNC: 9.1 MG/DL
CHLORIDE SERPL-SCNC: 105 MMOL/L
CO2 SERPL-SCNC: 25 MMOL/L
CREAT SERPL-MCNC: 0.8 MG/DL
CRP SERPL-MCNC: 1.1 MG/L
DIFFERENTIAL METHOD: NORMAL
EOSINOPHIL # BLD AUTO: 0.1 K/UL
EOSINOPHIL NFR BLD: 0.8 %
ERYTHROCYTE [DISTWIDTH] IN BLOOD BY AUTOMATED COUNT: 13.2 %
EST. GFR  (AFRICAN AMERICAN): >60 ML/MIN/1.73 M^2
EST. GFR  (NON AFRICAN AMERICAN): >60 ML/MIN/1.73 M^2
GLUCOSE SERPL-MCNC: 82 MG/DL
HCT VFR BLD AUTO: 39.4 %
HGB BLD-MCNC: 13.3 G/DL
LYMPHOCYTES # BLD AUTO: 2.1 K/UL
LYMPHOCYTES NFR BLD: 35.1 %
MCH RBC QN AUTO: 30.4 PG
MCHC RBC AUTO-ENTMCNC: 33.8 G/DL
MCV RBC AUTO: 90 FL
MONOCYTES # BLD AUTO: 0.8 K/UL
MONOCYTES NFR BLD: 13 %
NEUTROPHILS # BLD AUTO: 3 K/UL
NEUTROPHILS NFR BLD: 50.6 %
PLATELET # BLD AUTO: 237 K/UL
PMV BLD AUTO: 9.9 FL
POTASSIUM SERPL-SCNC: 3.5 MMOL/L
PROT SERPL-MCNC: 7.2 G/DL
RBC # BLD AUTO: 4.38 M/UL
SODIUM SERPL-SCNC: 138 MMOL/L
WBC # BLD AUTO: 6.01 K/UL

## 2017-07-25 PROCEDURE — 25000003 PHARM REV CODE 250: Performed by: INTERNAL MEDICINE

## 2017-07-25 PROCEDURE — 86140 C-REACTIVE PROTEIN: CPT

## 2017-07-25 PROCEDURE — 96361 HYDRATE IV INFUSION ADD-ON: CPT

## 2017-07-25 PROCEDURE — 36415 COLL VENOUS BLD VENIPUNCTURE: CPT

## 2017-07-25 PROCEDURE — 36592 COLLECT BLOOD FROM PICC: CPT

## 2017-07-25 PROCEDURE — 80053 COMPREHEN METABOLIC PANEL: CPT

## 2017-07-25 PROCEDURE — 85025 COMPLETE CBC W/AUTO DIFF WBC: CPT

## 2017-07-25 PROCEDURE — 96360 HYDRATION IV INFUSION INIT: CPT

## 2017-07-25 RX ORDER — DIPHENHYDRAMINE HYDROCHLORIDE 50 MG/ML
25 INJECTION INTRAMUSCULAR; INTRAVENOUS
Status: CANCELLED | OUTPATIENT
Start: 2017-07-25

## 2017-07-25 RX ORDER — ACETAMINOPHEN 325 MG/1
650 TABLET ORAL
Status: CANCELLED | OUTPATIENT
Start: 2017-07-25

## 2017-07-25 RX ORDER — PROMETHAZINE HYDROCHLORIDE 25 MG/1
TABLET ORAL
Qty: 30 TABLET | Refills: 3 | Status: SHIPPED | OUTPATIENT
Start: 2017-07-25 | End: 2017-10-09 | Stop reason: SDUPTHER

## 2017-07-25 RX ORDER — IPRATROPIUM BROMIDE AND ALBUTEROL SULFATE 2.5; .5 MG/3ML; MG/3ML
3 SOLUTION RESPIRATORY (INHALATION)
Status: CANCELLED | OUTPATIENT
Start: 2017-07-25

## 2017-07-25 RX ORDER — HEPARIN 100 UNIT/ML
500 SYRINGE INTRAVENOUS
Status: CANCELLED | OUTPATIENT
Start: 2017-07-25

## 2017-07-25 RX ORDER — EPINEPHRINE 1 MG/ML
0.3 INJECTION, SOLUTION, CONCENTRATE INTRAVENOUS
Status: CANCELLED | OUTPATIENT
Start: 2017-07-25

## 2017-07-25 RX ADMIN — SODIUM CHLORIDE 2000 ML: 0.9 INJECTION, SOLUTION INTRAVENOUS at 02:07

## 2017-07-25 NOTE — PROGRESS NOTES
pt's left subclavian medi port accessed without incidence, Labs drawn from port as ordered, sent to lab, flushed with NS 10cc.  Pt tolerated normal saline bolus( two liters) without incidence, medi port locked with Heparin flush 500 Units/5ml, pt deaccessed, no bleeding noted to site upon removal, band aid in place, tolerated well.

## 2017-07-26 ENCOUNTER — LAB VISIT (OUTPATIENT)
Dept: LAB | Facility: HOSPITAL | Age: 37
End: 2017-07-26
Attending: INTERNAL MEDICINE
Payer: COMMERCIAL

## 2017-07-26 DIAGNOSIS — R19.7 ACUTE DIARRHEA: ICD-10-CM

## 2017-07-26 LAB
C DIFF GDH STL QL: NEGATIVE
C DIFF TOX A+B STL QL IA: NEGATIVE

## 2017-07-26 PROCEDURE — 87449 NOS EACH ORGANISM AG IA: CPT

## 2017-08-01 ENCOUNTER — INFUSION (OUTPATIENT)
Dept: INFECTIOUS DISEASES | Facility: HOSPITAL | Age: 37
End: 2017-08-01
Attending: INTERNAL MEDICINE
Payer: COMMERCIAL

## 2017-08-01 VITALS
TEMPERATURE: 99 F | DIASTOLIC BLOOD PRESSURE: 90 MMHG | HEART RATE: 71 BPM | BODY MASS INDEX: 17.75 KG/M2 | WEIGHT: 94 LBS | OXYGEN SATURATION: 99 % | RESPIRATION RATE: 16 BRPM | HEIGHT: 61 IN | SYSTOLIC BLOOD PRESSURE: 129 MMHG

## 2017-08-01 DIAGNOSIS — K50.819 CROHN'S DISEASE OF SMALL AND LARGE INTESTINES WITH COMPLICATION: Primary | ICD-10-CM

## 2017-08-01 PROCEDURE — 25000003 PHARM REV CODE 250: Performed by: INTERNAL MEDICINE

## 2017-08-01 PROCEDURE — 96361 HYDRATE IV INFUSION ADD-ON: CPT

## 2017-08-01 PROCEDURE — 63600175 PHARM REV CODE 636 W HCPCS: Performed by: INTERNAL MEDICINE

## 2017-08-01 PROCEDURE — 96360 HYDRATION IV INFUSION INIT: CPT

## 2017-08-01 RX ORDER — HEPARIN 100 UNIT/ML
500 SYRINGE INTRAVENOUS
Status: CANCELLED | OUTPATIENT
Start: 2017-08-01

## 2017-08-01 RX ORDER — EPINEPHRINE 1 MG/ML
0.3 INJECTION, SOLUTION, CONCENTRATE INTRAVENOUS
Status: CANCELLED | OUTPATIENT
Start: 2017-08-01

## 2017-08-01 RX ORDER — ACETAMINOPHEN 325 MG/1
650 TABLET ORAL
Status: CANCELLED | OUTPATIENT
Start: 2017-08-01

## 2017-08-01 RX ORDER — HEPARIN 100 UNIT/ML
500 SYRINGE INTRAVENOUS
Status: DISCONTINUED | OUTPATIENT
Start: 2017-08-01 | End: 2017-08-01 | Stop reason: HOSPADM

## 2017-08-01 RX ORDER — IPRATROPIUM BROMIDE AND ALBUTEROL SULFATE 2.5; .5 MG/3ML; MG/3ML
3 SOLUTION RESPIRATORY (INHALATION)
Status: CANCELLED | OUTPATIENT
Start: 2017-08-01

## 2017-08-01 RX ORDER — DIPHENHYDRAMINE HYDROCHLORIDE 50 MG/ML
25 INJECTION INTRAMUSCULAR; INTRAVENOUS
Status: CANCELLED | OUTPATIENT
Start: 2017-08-01

## 2017-08-01 RX ADMIN — SODIUM CHLORIDE 2000 ML: 0.9 INJECTION, SOLUTION INTRAVENOUS at 01:08

## 2017-08-01 RX ADMIN — HEPARIN 500 UNITS: 100 SYRINGE at 04:08

## 2017-08-02 ENCOUNTER — PATIENT MESSAGE (OUTPATIENT)
Dept: GASTROENTEROLOGY | Facility: CLINIC | Age: 37
End: 2017-08-02

## 2017-08-02 DIAGNOSIS — R30.0 DYSURIA: Primary | ICD-10-CM

## 2017-08-02 DIAGNOSIS — K50.819 CROHN'S DISEASE OF SMALL AND LARGE INTESTINES WITH COMPLICATION: ICD-10-CM

## 2017-08-02 RX ORDER — OXYCODONE AND ACETAMINOPHEN 10; 325 MG/1; MG/1
1 TABLET ORAL EVERY 6 HOURS PRN
Qty: 30 TABLET | Refills: 0 | Status: SHIPPED | OUTPATIENT
Start: 2017-08-02 | End: 2017-08-24 | Stop reason: SDUPTHER

## 2017-08-02 RX ORDER — ONDANSETRON 8 MG/1
8 TABLET, ORALLY DISINTEGRATING ORAL EVERY 8 HOURS PRN
Qty: 30 TABLET | Refills: 0 | Status: SHIPPED | OUTPATIENT
Start: 2017-08-02 | End: 2017-09-18 | Stop reason: SDUPTHER

## 2017-08-04 ENCOUNTER — HOSPITAL ENCOUNTER (INPATIENT)
Facility: HOSPITAL | Age: 37
LOS: 2 days | Discharge: HOME | DRG: 392 | End: 2017-08-10
Attending: EMERGENCY MEDICINE | Admitting: INTERNAL MEDICINE
Payer: COMMERCIAL

## 2017-08-04 DIAGNOSIS — Z93.2 S/P ILEOSTOMY: ICD-10-CM

## 2017-08-04 DIAGNOSIS — K50.018 CROHN'S DISEASE OF SMALL INTESTINE WITH OTHER COMPLICATION: Chronic | ICD-10-CM

## 2017-08-04 DIAGNOSIS — I10 ESSENTIAL HYPERTENSION, BENIGN: Chronic | ICD-10-CM

## 2017-08-04 DIAGNOSIS — F41.1 GENERALIZED ANXIETY DISORDER: ICD-10-CM

## 2017-08-04 DIAGNOSIS — K50.919 EXACERBATION OF CROHN'S DISEASE, UNSPECIFIED COMPLICATION: ICD-10-CM

## 2017-08-04 DIAGNOSIS — R10.9 ABDOMINAL PAIN, UNSPECIFIED LOCATION: Primary | ICD-10-CM

## 2017-08-04 DIAGNOSIS — R11.0 NAUSEA: ICD-10-CM

## 2017-08-04 LAB
ALBUMIN SERPL BCP-MCNC: 3.6 G/DL
ALP SERPL-CCNC: 55 U/L
ALT SERPL W/O P-5'-P-CCNC: 15 U/L
ANION GAP SERPL CALC-SCNC: 10 MMOL/L
AST SERPL-CCNC: 18 U/L
BACTERIA #/AREA URNS AUTO: ABNORMAL /HPF
BASOPHILS # BLD AUTO: 0.01 K/UL
BASOPHILS NFR BLD: 0.1 %
BILIRUB SERPL-MCNC: 0.3 MG/DL
BILIRUB UR QL STRIP: NEGATIVE
BUN SERPL-MCNC: 10 MG/DL
CALCIUM SERPL-MCNC: 9.3 MG/DL
CHLORIDE SERPL-SCNC: 107 MMOL/L
CLARITY UR REFRACT.AUTO: CLEAR
CO2 SERPL-SCNC: 21 MMOL/L
COLOR UR AUTO: ABNORMAL
CREAT SERPL-MCNC: 0.9 MG/DL
CRP SERPL-MCNC: 0.9 MG/L
DIFFERENTIAL METHOD: NORMAL
EOSINOPHIL # BLD AUTO: 0.1 K/UL
EOSINOPHIL NFR BLD: 1.1 %
ERYTHROCYTE [DISTWIDTH] IN BLOOD BY AUTOMATED COUNT: 13.1 %
ERYTHROCYTE [SEDIMENTATION RATE] IN BLOOD BY WESTERGREN METHOD: 23 MM/HR
EST. GFR  (AFRICAN AMERICAN): >60 ML/MIN/1.73 M^2
EST. GFR  (NON AFRICAN AMERICAN): >60 ML/MIN/1.73 M^2
GLUCOSE SERPL-MCNC: 95 MG/DL
GLUCOSE UR QL STRIP: NEGATIVE
HCT VFR BLD AUTO: 38.2 %
HGB BLD-MCNC: 12.9 G/DL
HGB UR QL STRIP: ABNORMAL
INR PPP: 1
KETONES UR QL STRIP: NEGATIVE
LACTATE SERPL-SCNC: 1.2 MMOL/L
LEUKOCYTE ESTERASE UR QL STRIP: ABNORMAL
LIPASE SERPL-CCNC: 36 U/L
LYMPHOCYTES # BLD AUTO: 1.8 K/UL
LYMPHOCYTES NFR BLD: 23.3 %
MCH RBC QN AUTO: 30.9 PG
MCHC RBC AUTO-ENTMCNC: 33.8 G/DL
MCV RBC AUTO: 92 FL
MICROSCOPIC COMMENT: ABNORMAL
MONOCYTES # BLD AUTO: 0.9 K/UL
MONOCYTES NFR BLD: 12.4 %
NEUTROPHILS # BLD AUTO: 4.8 K/UL
NEUTROPHILS NFR BLD: 62.7 %
NITRITE UR QL STRIP: NEGATIVE
PH UR STRIP: 5 [PH] (ref 5–8)
PLATELET # BLD AUTO: 253 K/UL
PMV BLD AUTO: 9.8 FL
POTASSIUM SERPL-SCNC: 3.6 MMOL/L
PROT SERPL-MCNC: 7.4 G/DL
PROT UR QL STRIP: NEGATIVE
PROTHROMBIN TIME: 10.8 SEC
RBC # BLD AUTO: 4.17 M/UL
RBC #/AREA URNS AUTO: 2 /HPF (ref 0–4)
SODIUM SERPL-SCNC: 138 MMOL/L
SP GR UR STRIP: 1.01 (ref 1–1.03)
SQUAMOUS #/AREA URNS AUTO: 1 /HPF
URN SPEC COLLECT METH UR: ABNORMAL
UROBILINOGEN UR STRIP-ACNC: NEGATIVE EU/DL
WBC # BLD AUTO: 7.61 K/UL
WBC #/AREA URNS AUTO: 6 /HPF (ref 0–5)

## 2017-08-04 PROCEDURE — G0378 HOSPITAL OBSERVATION PER HR: HCPCS

## 2017-08-04 PROCEDURE — 85610 PROTHROMBIN TIME: CPT

## 2017-08-04 PROCEDURE — 87449 NOS EACH ORGANISM AG IA: CPT

## 2017-08-04 PROCEDURE — 86140 C-REACTIVE PROTEIN: CPT

## 2017-08-04 PROCEDURE — 63600175 PHARM REV CODE 636 W HCPCS: Performed by: PHYSICIAN ASSISTANT

## 2017-08-04 PROCEDURE — 87046 STOOL CULTR AEROBIC BACT EA: CPT | Mod: 59

## 2017-08-04 PROCEDURE — 25000003 PHARM REV CODE 250: Performed by: PHYSICIAN ASSISTANT

## 2017-08-04 PROCEDURE — 87427 SHIGA-LIKE TOXIN AG IA: CPT

## 2017-08-04 PROCEDURE — 87040 BLOOD CULTURE FOR BACTERIA: CPT

## 2017-08-04 PROCEDURE — 11000001 HC ACUTE MED/SURG PRIVATE ROOM

## 2017-08-04 PROCEDURE — 63600175 PHARM REV CODE 636 W HCPCS: Performed by: INTERNAL MEDICINE

## 2017-08-04 PROCEDURE — 80053 COMPREHEN METABOLIC PANEL: CPT

## 2017-08-04 PROCEDURE — 83630 LACTOFERRIN FECAL (QUAL): CPT

## 2017-08-04 PROCEDURE — 81001 URINALYSIS AUTO W/SCOPE: CPT

## 2017-08-04 PROCEDURE — 96361 HYDRATE IV INFUSION ADD-ON: CPT

## 2017-08-04 PROCEDURE — 96374 THER/PROPH/DIAG INJ IV PUSH: CPT

## 2017-08-04 PROCEDURE — 99285 EMERGENCY DEPT VISIT HI MDM: CPT | Mod: ,,, | Performed by: EMERGENCY MEDICINE

## 2017-08-04 PROCEDURE — 99285 EMERGENCY DEPT VISIT HI MDM: CPT | Mod: 25

## 2017-08-04 PROCEDURE — 83690 ASSAY OF LIPASE: CPT

## 2017-08-04 PROCEDURE — 87045 FECES CULTURE AEROBIC BACT: CPT

## 2017-08-04 PROCEDURE — 85025 COMPLETE CBC W/AUTO DIFF WBC: CPT

## 2017-08-04 PROCEDURE — 85651 RBC SED RATE NONAUTOMATED: CPT

## 2017-08-04 PROCEDURE — 96375 TX/PRO/DX INJ NEW DRUG ADDON: CPT

## 2017-08-04 PROCEDURE — 83605 ASSAY OF LACTIC ACID: CPT

## 2017-08-04 PROCEDURE — 96376 TX/PRO/DX INJ SAME DRUG ADON: CPT

## 2017-08-04 PROCEDURE — 99220 PR INITIAL OBSERVATION CARE,LEVL III: CPT | Mod: ,,, | Performed by: PHYSICIAN ASSISTANT

## 2017-08-04 RX ORDER — HYDROMORPHONE HYDROCHLORIDE 1 MG/ML
1 INJECTION, SOLUTION INTRAMUSCULAR; INTRAVENOUS; SUBCUTANEOUS
Status: DISCONTINUED | OUTPATIENT
Start: 2017-08-04 | End: 2017-08-04

## 2017-08-04 RX ORDER — HYDROMORPHONE HYDROCHLORIDE 1 MG/ML
1 INJECTION, SOLUTION INTRAMUSCULAR; INTRAVENOUS; SUBCUTANEOUS EVERY 6 HOURS PRN
Status: DISCONTINUED | OUTPATIENT
Start: 2017-08-04 | End: 2017-08-04

## 2017-08-04 RX ORDER — HYDROMORPHONE HYDROCHLORIDE 1 MG/ML
1 INJECTION, SOLUTION INTRAMUSCULAR; INTRAVENOUS; SUBCUTANEOUS ONCE
Status: COMPLETED | OUTPATIENT
Start: 2017-08-04 | End: 2017-08-04

## 2017-08-04 RX ORDER — CITALOPRAM 20 MG/1
20 TABLET, FILM COATED ORAL DAILY
Status: DISCONTINUED | OUTPATIENT
Start: 2017-08-05 | End: 2017-08-10 | Stop reason: HOSPADM

## 2017-08-04 RX ORDER — ONDANSETRON 2 MG/ML
4 INJECTION INTRAMUSCULAR; INTRAVENOUS
Status: COMPLETED | OUTPATIENT
Start: 2017-08-04 | End: 2017-08-04

## 2017-08-04 RX ORDER — PROMETHAZINE HYDROCHLORIDE 12.5 MG/1
12.5 TABLET ORAL EVERY 6 HOURS PRN
Status: DISCONTINUED | OUTPATIENT
Start: 2017-08-04 | End: 2017-08-10 | Stop reason: HOSPADM

## 2017-08-04 RX ORDER — SODIUM CHLORIDE 9 MG/ML
INJECTION, SOLUTION INTRAVENOUS CONTINUOUS
Status: ACTIVE | OUTPATIENT
Start: 2017-08-04 | End: 2017-08-05

## 2017-08-04 RX ORDER — HYDROMORPHONE HYDROCHLORIDE 1 MG/ML
1 INJECTION, SOLUTION INTRAMUSCULAR; INTRAVENOUS; SUBCUTANEOUS
Status: COMPLETED | OUTPATIENT
Start: 2017-08-04 | End: 2017-08-04

## 2017-08-04 RX ORDER — DIPHENOXYLATE HYDROCHLORIDE AND ATROPINE SULFATE 2.5; .025 MG/1; MG/1
1 TABLET ORAL 3 TIMES DAILY PRN
Status: DISCONTINUED | OUTPATIENT
Start: 2017-08-04 | End: 2017-08-10 | Stop reason: HOSPADM

## 2017-08-04 RX ORDER — ONDANSETRON 2 MG/ML
4 INJECTION INTRAMUSCULAR; INTRAVENOUS
Status: DISCONTINUED | OUTPATIENT
Start: 2017-08-04 | End: 2017-08-04

## 2017-08-04 RX ORDER — ONDANSETRON 2 MG/ML
4 INJECTION INTRAMUSCULAR; INTRAVENOUS EVERY 6 HOURS PRN
Status: DISCONTINUED | OUTPATIENT
Start: 2017-08-04 | End: 2017-08-10 | Stop reason: HOSPADM

## 2017-08-04 RX ORDER — ACETAMINOPHEN 325 MG/1
650 TABLET ORAL EVERY 6 HOURS PRN
Status: DISCONTINUED | OUTPATIENT
Start: 2017-08-04 | End: 2017-08-10 | Stop reason: HOSPADM

## 2017-08-04 RX ORDER — TRAMADOL HYDROCHLORIDE 50 MG/1
50 TABLET ORAL EVERY 6 HOURS PRN
Status: DISCONTINUED | OUTPATIENT
Start: 2017-08-04 | End: 2017-08-05

## 2017-08-04 RX ORDER — ZOLPIDEM TARTRATE 5 MG/1
10 TABLET ORAL NIGHTLY
Status: DISCONTINUED | OUTPATIENT
Start: 2017-08-04 | End: 2017-08-10 | Stop reason: HOSPADM

## 2017-08-04 RX ORDER — OXYCODONE AND ACETAMINOPHEN 10; 325 MG/1; MG/1
1 TABLET ORAL EVERY 6 HOURS PRN
Status: DISCONTINUED | OUTPATIENT
Start: 2017-08-04 | End: 2017-08-04

## 2017-08-04 RX ORDER — ALPRAZOLAM 0.5 MG/1
0.5 TABLET ORAL 2 TIMES DAILY
Status: DISCONTINUED | OUTPATIENT
Start: 2017-08-04 | End: 2017-08-06

## 2017-08-04 RX ORDER — HYDROMORPHONE HYDROCHLORIDE 1 MG/ML
1 INJECTION, SOLUTION INTRAMUSCULAR; INTRAVENOUS; SUBCUTANEOUS EVERY 4 HOURS PRN
Status: DISCONTINUED | OUTPATIENT
Start: 2017-08-04 | End: 2017-08-08

## 2017-08-04 RX ORDER — RAMELTEON 8 MG/1
8 TABLET ORAL NIGHTLY PRN
Status: DISCONTINUED | OUTPATIENT
Start: 2017-08-04 | End: 2017-08-10 | Stop reason: HOSPADM

## 2017-08-04 RX ADMIN — HYDROMORPHONE HYDROCHLORIDE 1 MG: 1 INJECTION, SOLUTION INTRAMUSCULAR; INTRAVENOUS; SUBCUTANEOUS at 11:08

## 2017-08-04 RX ADMIN — ALPRAZOLAM 0.5 MG: 0.5 TABLET ORAL at 10:08

## 2017-08-04 RX ADMIN — ZOLPIDEM TARTRATE 10 MG: 5 TABLET, FILM COATED ORAL at 10:08

## 2017-08-04 RX ADMIN — ONDANSETRON 4 MG: 2 INJECTION INTRAMUSCULAR; INTRAVENOUS at 01:08

## 2017-08-04 RX ADMIN — ONDANSETRON 4 MG: 2 INJECTION INTRAMUSCULAR; INTRAVENOUS at 09:08

## 2017-08-04 RX ADMIN — PROMETHAZINE HYDROCHLORIDE 12.5 MG: 12.5 TABLET ORAL at 06:08

## 2017-08-04 RX ADMIN — SODIUM CHLORIDE: 0.9 INJECTION, SOLUTION INTRAVENOUS at 03:08

## 2017-08-04 RX ADMIN — HYDROMORPHONE HYDROCHLORIDE 1 MG: 1 INJECTION, SOLUTION INTRAMUSCULAR; INTRAVENOUS; SUBCUTANEOUS at 06:08

## 2017-08-04 RX ADMIN — SODIUM CHLORIDE 1000 ML: 0.9 INJECTION, SOLUTION INTRAVENOUS at 12:08

## 2017-08-04 RX ADMIN — ONDANSETRON 4 MG: 2 INJECTION INTRAMUSCULAR; INTRAVENOUS at 11:08

## 2017-08-04 RX ADMIN — HYDROMORPHONE HYDROCHLORIDE 1 MG: 1 INJECTION, SOLUTION INTRAMUSCULAR; INTRAVENOUS; SUBCUTANEOUS at 09:08

## 2017-08-04 RX ADMIN — HYDROMORPHONE HYDROCHLORIDE 1 MG: 1 INJECTION, SOLUTION INTRAMUSCULAR; INTRAVENOUS; SUBCUTANEOUS at 01:08

## 2017-08-04 NOTE — ASSESSMENT & PLAN NOTE
Plan per GI was to start stelara.  Pt received loading dose on 7/11/2017.  GI consulted as above

## 2017-08-04 NOTE — H&P
[facility] Hospital Medicine  History & Physical    Patient Name: Ivy Salgado  MRN: [id number]  Admission Date: 8/4/2017  Attending Physician: Elvin Pedraza MD   Primary Care Provider: Kalia Astorga MD    [facility] Medicine Team: [facility] F Anne-Marie Calle PA-C     Patient information was obtained from patient, past medical records and ER records.     Subjective:     Principal Problem:Abdominal pain    Chief Complaint:   Chief Complaint   Patient presents with    Multiple Complaints     abd pain, diarrhea, redness and blistering around ostomy site/ multiple bruising- nausea, vomiting        HPI: 36 y/o F with h/o Crohn's since she was 9yo s/p surgery 5 years ago for colectomy and rectum removed. She was doing well and in remission until April 2017. She follow with Dr. Ross and was considering starting Stelera.  Pt presented to the ED with fevers (last night 100.7°F),  SOB, nausea without vomiting, headache, lightheadedness, 7/10 abdominal pain that is not relieved with at-home Percocet.   Pt reports increased output in her ostomy that is very watery.  This is cause severe irritation of her stoma and the skin around her stoma. She has noticed some BRB in her ostomy bag but denies any melena.  She was treated for ESBL UTI with Macrobid, ciprofloxacin, then Augmentin (last one week ago).  She reports some continuing dysuria and lower back pain.  She is receiving IV fluids weekly for dehydration and received 2 L on 8/1/17.    In the ED, CBC with no leukocytosis with WBC 7.61. CMP with no acute abnormalities. CRP WNL at 0.9. Lipase WNL at 36. PT/INR 10.8/1.0. Lactic acid WNL at 1.2. UA unremarkable. ESR 23 and blood culture x 2 pending.  GI consulted.      Past Medical History:   Diagnosis Date    Abnormal Pap smear 2007    Abnormal Pap smear 5/26/2011    Anemia     Anxiety     Arthritis     C. difficile diarrhea     Crohn's disease     Encounter for blood transfusion      Genital HSV     History of colposcopy with cervical biopsy 2007 and 7/2011 2007-LYLA I  and 7/2011- LYLA I    Hypertension     Kidney stone     Recurrent UTI 4/3/2013    S/P ileostomy 7/9/2012    Sterilization 6/23/2012       Past Surgical History:   Procedure Laterality Date    ABDOMINAL SURGERY      APPENDECTOMY      BLADDER SURGERY      partial cystectomy due to fistula    CKC      COLON SURGERY      COLONOSCOPY      HYSTERECTOMY  04/16/2015    SHELLIE    ILEOSTOMY      OOPHORECTOMY Right 04/16/2015    PORTACATH PLACEMENT      SKIN BIOPSY      SMALL INTESTINE SURGERY      TOTAL COLECTOMY      TUBAL LIGATION  06 06 2012    UPPER GASTROINTESTINAL ENDOSCOPY         Review of patient's allergies indicates:   Allergen Reactions    Azathioprine sodium      Other reaction(s): pancreatitis  Other reaction(s): pancreatitis    Methotrexate      Other reaction(s): infection-    Morphine Itching and Other (See Comments)     Other reaction(s): Itching       No current facility-administered medications on file prior to encounter.      Current Outpatient Prescriptions on File Prior to Encounter   Medication Sig    alprazolam (XANAX) 0.5 MG tablet TAKE 1 TABLET (0.5 MG TOTAL) BY MOUTH 2 (TWO) TIMES DAILY.    amoxicillin-clavulanate 875-125mg (AUGMENTIN) 875-125 mg per tablet Take 1 tablet by mouth every 12 (twelve) hours.    certolizumab pegol (CIMZIA) 400 mg/2 mL (200 mg/mL x 2) SyKt kit Inject 400 mg (2 mLs total) into the skin every 28 days.    diphenoxylate-atropine 2.5-0.025 mg (LOMOTIL) 2.5-0.025 mg per tablet Take 1 tablet by mouth 3 (three) times daily as needed for Diarrhea.    fluconazole (DIFLUCAN) 100 MG tablet Take 1 tablet (100 mg total) by mouth once daily.    lisinopril 10 MG tablet TAKE 1 TABLET (10 MG TOTAL) BY MOUTH ONCE DAILY.    norethindrone-ethinyl estradiol (GILDESS FE 1/20, 28,) 1 mg-20 mcg (21)/75 mg (7) per tablet Take 1 tablet by mouth once daily. Take one active pill daily  continuously.    ondansetron (ZOFRAN-ODT) 8 MG TbDL Take 1 tablet (8 mg total) by mouth every 8 (eight) hours as needed (nausea).    oxycodone-acetaminophen (PERCOCET)  mg per tablet Take 1 tablet by mouth every 6 (six) hours as needed for Pain.    promethazine (PHENERGAN) 25 MG tablet TAKE 1 TABLET BY MOUTH EVERY 6 HOURS AS NEEDED FOR NAUSEA    sumatriptan (IMITREX) 100 MG tablet Take 1 tablet (100 mg total) by mouth every 2 (two) hours as needed for Migraine (do not exceed 2 doses in 24 hours).    valacyclovir (VALTREX) 500 MG tablet Take 1 tablet (500 mg total) by mouth once daily.    zolpidem (AMBIEN) 10 mg Tab TAKE 1 TABLET BY MOUTH AT BEDTIME     Family History     Problem Relation (Age of Onset)    Cancer Father, Maternal Grandfather    Colon cancer Father    Crohn's disease Brother    Endometrial cancer Maternal Aunt    Hypertension Mother    Skin cancer Maternal Grandfather        Social History Main Topics    Smoking status: Never Smoker    Smokeless tobacco: Never Used    Alcohol use 0.0 oz/week      Comment: Patient only drinks wine not regular basis.    Drug use: No    Sexual activity: Yes     Partners: Male     Birth control/ protection: Pill, Surgical, Condom      Comment: HYST     Review of Systems   Constitutional: Positive for fever and unexpected weight change. Negative for chills and diaphoresis.   HENT: Negative for congestion, facial swelling, trouble swallowing and voice change.    Eyes: Negative for photophobia, pain, redness and visual disturbance.   Respiratory: Positive for shortness of breath. Negative for cough, chest tightness and wheezing.    Cardiovascular: Negative for chest pain, palpitations and leg swelling.   Gastrointestinal: Positive for abdominal pain, blood in stool, diarrhea and nausea.   Endocrine: Negative for cold intolerance, heat intolerance, polydipsia and polyuria.   Genitourinary: Positive for dysuria. Negative for flank pain, hematuria and pelvic  pain.   Musculoskeletal: Positive for back pain. Negative for arthralgias, myalgias and neck pain.   Skin: Negative for color change, pallor, rash and wound.   Neurological: Positive for light-headedness and headaches. Negative for dizziness, tremors and weakness.   Hematological: Negative for adenopathy. Bruises/bleeds easily (not new).   Psychiatric/Behavioral: Negative for confusion, dysphoric mood and sleep disturbance. The patient is not nervous/anxious.      Objective:     Vital Signs (Most Recent):  Temp: 98.6 °F (37 °C) (08/04/17 1109)  Pulse: 74 (08/04/17 1431)  Resp: 17 (08/04/17 1109)  BP: (!) 139/98 (08/04/17 1431)  SpO2: 99 % (08/04/17 1431) Vital Signs (24h Range):  Temp:  [98.6 °F (37 °C)] 98.6 °F (37 °C)  Pulse:  [74-98] 74  Resp:  [17] 17  SpO2:  [97 %-100 %] 99 %  BP: (134-139)/(85-98) 139/98     Weight: 42.6 kg (94 lb)  Body mass index is 17.76 kg/m².    Physical Exam   Constitutional: She is oriented to person, place, and time. She appears well-developed and well-nourished.   HENT:   Head: Normocephalic and atraumatic.   Eyes: EOM are normal. Pupils are equal, round, and reactive to light.   Neck: Normal range of motion. Neck supple. No tracheal deviation present. No thyromegaly present.   Cardiovascular: Normal rate and regular rhythm.    No murmur heard.  Pulmonary/Chest: Effort normal and breath sounds normal. She has no wheezes.   Abdominal: Soft. Bowel sounds are normal. There is tenderness.   +ostomy in place   Musculoskeletal: Normal range of motion. She exhibits no edema or tenderness.   Lymphadenopathy:     She has no cervical adenopathy.   Neurological: She is alert and oriented to person, place, and time. She has normal reflexes. No cranial nerve deficit.   Skin: Skin is warm and dry.   Psychiatric: She has a normal mood and affect. Her behavior is normal.   Nursing note and vitals reviewed.       Significant Labs: All pertinent labs within the past 24 hours have been  reviewed.    Significant Imaging: I have reviewed all pertinent imaging results/findings within the past 24 hours.    Assessment/Plan:     * Abdominal pain    Differential includes active Crohn's disease, obstruction.  Pt afebrile and without leukocytosis.  CMP with no acute abnormalities. CRP WNL at 0.9. Lipase WNL at 36. Lactic acid WNL at 1.2. UA unremarkable. ESR 23 and blood culture x 2 pending.    GI consulted.  Supportive care with pain medication and IVF.              Crohn's disease    Plan per GI was to start stelara.  Pt received loading dose on 7/11/2017.  GI consulted as above          S/P ileostomy    Increased watery output per patient resulting in irritation around stoma site  Pt agreeable to WC consult  Will continue to monitor  Strict I/Os          Essential hypertension, benign    BP controlled on admission  Will hold lisinopril at this time based on increased output  Will continue to monitor          Generalized anxiety disorder    Will continue xanax            VTE Risk Mitigation     None             Anne-Marie Calle PA-C  Department of Hospital Medicine   [facility]

## 2017-08-04 NOTE — ASSESSMENT & PLAN NOTE
Differential includes active Crohn's disease, obstruction.  Pt afebrile and without leukocytosis.  CMP with no acute abnormalities. CRP WNL at 0.9. Lipase WNL at 36. Lactic acid WNL at 1.2. UA unremarkable. ESR 23 and blood culture x 2 pending.    GI consulted.  Supportive care with pain medication and IVF.

## 2017-08-04 NOTE — ED NOTES
"Patient identifiers for Ivy Salgado 37 y.o. female checked and correct.  Chief Complaint   Patient presents with    Multiple Complaints     abd pain, diarrhea, redness and blistering around ostomy site/ multiple bruising- nausea, vomiting     Pt presents with diffuse abd pain, crohn's flare, medication change on July 11. Pt states since the new medication "it's been all down hill". Pt reports increased bruising, fever of 100.7 last night, increased output to colostomy. Pt states stoma is "slophing off and bleeding". Pt on abx for ESBL but has not finished b/c "I can't keep them down". Pt states she has taken Macrobid, Cipro, Augmentin. Reports SOB r/t dehydration w/nausea, denies emesis, intermittent burning during urination and back pain, lightheadedness. Pt states she has been taking percocet for pain, which is "not really" working. Pt rates abd pain 7/10. Pt receives weekly infusions at the clinic, states last week she received 2L. Pt also reports weight loss.     Past Medical History:   Diagnosis Date    Abnormal Pap smear 2007    Abnormal Pap smear 5/26/2011    Anemia     Anxiety     Arthritis     C. difficile diarrhea     Crohn's disease     Encounter for blood transfusion     Genital HSV     History of colposcopy with cervical biopsy 2007 and 7/2011 2007-LYLA I  and 7/2011- LYLA I    Hypertension     Kidney stone     Recurrent UTI 4/3/2013    S/P ileostomy 7/9/2012    Sterilization 6/23/2012     Allergies reported:   Review of patient's allergies indicates:   Allergen Reactions    Azathioprine sodium      Other reaction(s): pancreatitis  Other reaction(s): pancreatitis    Methotrexate      Other reaction(s): infection-    Morphine Itching and Other (See Comments)     Other reaction(s): Itching       LOC: Patient is awake, alert, and aware of environment with an appropriate affect. Patient is oriented x 3 and speaking appropriately.  APPEARANCE: Patient resting comfortably and in no " "acute distress. Patient is clean and well groomed, patient's clothing is properly fastened.  HEENT: WNL  SKIN: The skin is warm and dry. Patient has normal skin turgor and moist mucus membranes. Skin around stoma is red, swollen, irritated and "slophing off". Pt reports increased bruising.   MUSKULOSKELETAL: Patient is moving all extremities well, no obvious deformities noted. Pulses intact.   RESPIRATORY: Airway is open and patent. Respirations are spontaneous and non-labored with normal effort and rate, BBS=clear. Pt reports SOB.  CARDIAC: Patient has a normal rate and rhythm. Placed on cardiac monitor, no peripheral edema noted.   ABDOMEN: No distention noted. Bowel sounds active in all 4 quadrants. Soft and tender upon palpation. Pt reports increased output from colostomy since February with blood.   NEUROLOGICAL: pupils 3mm, PERRL. Facial expression is symmetrical. Hand grasps are equal bilaterally. Normal sensation in all extremities when touched with finger.      "

## 2017-08-04 NOTE — HPI
38 y/o F with h/o Crohn's since she was 11yo s/p surgery 5 years ago for colectomy and rectum removed. She was doing well and in remission until April 2017. She follow with Dr. Ross and was considering starting Stelera.  Pt presented to the ED with fevers (last night 100.7°F), SOB, nausea without vomiting, headache, lightheadedness, 7/10 abdominal pain that is not relieved with at-home Percocet.  Pt reports increased output in her ostomy that is very watery.  This is cause severe irritation of her stoma and the skin around her stoma. She has noticed some BRB in her ostomy bag but denies any melena.  She was treated for ESBL UTI with Macrobid, ciprofloxacin, then Augmentin (last one week ago).  She reports some continuing dysuria and lower back pain.  She is receiving IV fluids weekly for dehydration and received 2 L on 8/1/17.    In the ED, CBC with no leukocytosis with WBC 7.61. CMP with no acute abnormalities. CRP WNL at 0.9. Lipase WNL at 36. PT/INR 10.8/1.0. Lactic acid WNL at 1.2. UA unremarkable. ESR 23 and blood culture x 2 pending.  GI consulted.

## 2017-08-04 NOTE — ASSESSMENT & PLAN NOTE
Increased watery output per patient resulting in irritation around stoma site  Pt agreeable to WC consult  Will continue to monitor  Strict I/Os

## 2017-08-04 NOTE — HPI
36 y/o F with h/o Crohn's since she was 9yo s/p surgery 5 years ago with colectomy and rectum removed. She was doing well and in remission until April 2017, at which time she saw Dr. Ross in clinic, it was noted that she had nausea, vomiting intermittently, abdominal pain and increased ostomy output associated with weight loss, she had been on cimzia for several years, they discussed various options to assess her disease activity and ended up doing an MR enterography which suggested active disease and the decision was made to switch to Stelera, with the first dose being given 7/13/17, and she states that basically she has still been having the same symptoms since April, she has also In the interm at the end of June diagnosed with possible pyelonephritis and was given a shot of rocephin and outpatient augmentin, which she only took about 4-5 days of and was really able to tolerate more secondary to nausea.      Pt presents to the ED now with fevers at home (last night 100.7°F),  SOB, nausea without vomiting, headache, lightheadedness, 7/10 abdominal pain that is not relieved with at-home Percocet.  Pt reports increased output in her ostomy that is very watery and causing severe irritation of her stoma and the skin around her stoma. She has noticed some BRB in her ostomy bag very rarely, small amount, but denies any melena.  She was treated for ESBL UTI as noted and also with Macrobid, ciprofloxacin, then Augmentin (last one week ago).  She reports some continuing dysuria and lower back pain at times.  She is receiving IV fluids weekly for dehydration and received 2 L on 8/1/17.     In the ED, CBC with WBC 7.61. CMP with no acute abnormalities. CRP WNL at 0.9. Lipase WNL at 36. PT/INR 10.8/1.0. Lactic acid WNL at 1.2. UA unremarkable. ESR 23 and blood culture x 2 pending.      Also of note patient is very tearful and upset upon interview with medical student earlier today and also upon interview currently and admits  she had tried celexa in the past for depression and helping to cope with her physical illness and she thinks she may need this again.

## 2017-08-04 NOTE — CONSULTS
[facility]  Gastroenterology  Consult Note    Patient Name: Ivy Salgado  MRN: [id number]  Admission Date: 8/4/2017  Hospital Length of Stay: 0 days  Code Status: Full Code   Attending Provider: Ant Bourgeois MD   Consulting Provider: Vera Allison MD  Primary Care Physician: Kalia Astorga MD  Principal Problem:Abdominal pain    Inpatient consult to Gastroenterology  Consult performed by: VERA ALLISON  Consult ordered by: RACH FREGOSO        Subjective:     HPI:  38 y/o F with h/o Crohn's since she was 9yo s/p surgery 5 years ago with colectomy and rectum removed. She was doing well and in remission until April 2017, at which time she saw Dr. Ross in clinic, it was noted that she had nausea, vomiting intermittently, abdominal pain and increased ostomy output associated with weight loss, she had been on cimzia for several years, they discussed various options to assess her disease activity and ended up doing an MR enterography which suggested active disease and the decision was made to switch to Stelera, with the first dose being given 7/13/17, and she states that basically she has still been having the same symptoms since April, she has also In the interm at the end of June diagnosed with possible pyelonephritis and was given a shot of rocephin and outpatient augmentin, which she only took about 4-5 days of and was really able to tolerate more secondary to nausea.      Pt presents to the ED now with fevers at home (last night 100.7°F),  SOB, nausea without vomiting, headache, lightheadedness, 7/10 abdominal pain that is not relieved with at-home Percocet.  Pt reports increased output in her ostomy that is very watery and causing severe irritation of her stoma and the skin around her stoma. She has noticed some BRB in her ostomy bag very rarely, small amount, but denies any melena.  She was treated for ESBL UTI as noted and also with Macrobid, ciprofloxacin, then Augmentin  (last one week ago).  She reports some continuing dysuria and lower back pain at times.  She is receiving IV fluids weekly for dehydration and received 2 L on 8/1/17.     In the ED, CBC with WBC 7.61. CMP with no acute abnormalities. CRP WNL at 0.9. Lipase WNL at 36. PT/INR 10.8/1.0. Lactic acid WNL at 1.2. UA unremarkable. ESR 23 and blood culture x 2 pending.      Also of note patient is very tearful and upset upon interview with medical student earlier today and also upon interview currently and admits she had tried celexa in the past for depression and helping to cope with her physical illness and she thinks she may need this again.    Past Medical History:   Diagnosis Date    Abnormal Pap smear 2007    Abnormal Pap smear 5/26/2011    Anemia     Anxiety     Arthritis     C. difficile diarrhea     Crohn's disease     Encounter for blood transfusion     Genital HSV     History of colposcopy with cervical biopsy 2007 and 7/2011 2007-LYLA I  and 7/2011- LYLA I    Hypertension     Kidney stone     Recurrent UTI 4/3/2013    S/P ileostomy 7/9/2012    Sterilization 6/23/2012       Past Surgical History:   Procedure Laterality Date    ABDOMINAL SURGERY      APPENDECTOMY      BLADDER SURGERY      partial cystectomy due to fistula    CKC      COLON SURGERY      COLONOSCOPY      HYSTERECTOMY  04/16/2015    SHELLIE    ILEOSTOMY      OOPHORECTOMY Right 04/16/2015    PORTACATH PLACEMENT      SKIN BIOPSY      SMALL INTESTINE SURGERY      TOTAL COLECTOMY      TUBAL LIGATION  06 06 2012    UPPER GASTROINTESTINAL ENDOSCOPY         Review of patient's allergies indicates:   Allergen Reactions    Azathioprine sodium      Other reaction(s): pancreatitis  Other reaction(s): pancreatitis    Methotrexate      Other reaction(s): infection-    Morphine Itching and Other (See Comments)     Other reaction(s): Itching     Family History     Problem Relation (Age of Onset)    Cancer Father, Maternal Grandfather     Colon cancer Father    Crohn's disease Brother    Endometrial cancer Maternal Aunt    Hypertension Mother    Skin cancer Maternal Grandfather        Social History Main Topics    Smoking status: Never Smoker    Smokeless tobacco: Never Used    Alcohol use 0.0 oz/week      Comment: Patient only drinks wine not regular basis.    Drug use: No    Sexual activity: Yes     Partners: Male     Birth control/ protection: Pill, Surgical, Condom      Comment: HYST     Review of Systems   Constitutional: Positive for appetite change, fatigue, fever and unexpected weight change. Negative for chills.   HENT: Negative for sore throat and trouble swallowing.    Respiratory: Negative for cough and shortness of breath.    Cardiovascular: Negative for chest pain and palpitations.   Gastrointestinal: Positive for abdominal pain, diarrhea and nausea. Negative for abdominal distention and vomiting.   Genitourinary: Positive for dysuria.   Skin: Negative for pallor and rash.   Hematological: Bruises/bleeds easily.   Psychiatric/Behavioral: Negative for agitation and behavioral problems.     Objective:     Vital Signs (Most Recent):  Temp: 98.6 °F (37 °C) (08/04/17 1109)  Pulse: 74 (08/04/17 1431)  Resp: 17 (08/04/17 1109)  BP: (!) 139/98 (08/04/17 1431)  SpO2: 99 % (08/04/17 1431) Vital Signs (24h Range):  Temp:  [98.6 °F (37 °C)] 98.6 °F (37 °C)  Pulse:  [74-98] 74  Resp:  [17] 17  SpO2:  [97 %-100 %] 99 %  BP: (134-139)/(85-98) 139/98     Weight: 42.8 kg (94 lb 5.7 oz) (08/04/17 1600)  Body mass index is 17.83 kg/m².      Intake/Output Summary (Last 24 hours) at 08/04/17 1758  Last data filed at 08/04/17 1600   Gross per 24 hour   Intake                0 ml   Output                0 ml   Net                0 ml       Lines/Drains/Airways     Central Venous Catheter Line                 Port A Cath Single Lumen 02/21/17 0820 left subclavian 164 days          Drain                 Colostomy RLQ 36739 days         Ileostomy  08556  days         Ileostomy RUQ 86881 days                Physical Exam   Constitutional: She is oriented to person, place, and time. She appears well-developed and well-nourished.   HENT:   Head: Normocephalic and atraumatic.   Eyes: Conjunctivae are normal. No scleral icterus.   Neck: Normal range of motion. Neck supple.   Cardiovascular: Normal rate and regular rhythm.  Exam reveals no friction rub.    No murmur heard.  Pulmonary/Chest: Effort normal and breath sounds normal. No respiratory distress. She has no wheezes.   Abdominal: Soft. Bowel sounds are normal. There is no rebound and no guarding.   Mildly tender diffusely to deep palpation, ostomy bag in place with brown stool     Neurological: She is alert and oriented to person, place, and time.   Skin: Skin is warm and dry.   Psychiatric: She has a normal mood and affect. Her behavior is normal.       Significant Labs:  All pertinent lab results from the last 24 hours have been reviewed.    Significant Imaging:  Imaging results within the past 24 hours have been reviewed.    Assessment/Plan:     Crohn's disease    38 y/o F with h/o small bowel and colonic Crohn's since she was 9yo s/p surgery 5 years ago with colectomy and rectum removed. She was doing well and in remission until April 2017, at which time she saw Dr. Ross in clinic, it was noted that she had nausea, vomiting intermittently, abdominal pain and increased ostomy output associated with weight loss, she had been on cimzia for several years, MR enterography suggested active disease in the small bowel and the decision was made to switch to Stelera, with the first dose being given 7/13/17, and she states that basically she has still been having the same symptoms since April, also pyelonephritis and was given a shot of rocephin and outpatient augmentin, which she only took about 4-5 days of and was really able to tolerate more secondary to nausea.      Pt presents to the ED now with fevers at home (last  night 100.7°F),  SOB, nausea without vomiting, headache, lightheadedness, 7/10 abdominal pain that is not relieved with at-home Percocet.  Pt reports increased output in her ostomy that is very watery and causing severe irritation of her stoma and the skin around her stoma.  She reports some continuing dysuria and lower back pain at times.      In the ED, CBC with WBC 7.61. CMP with no acute abnormalities. CRP WNL at 0.9. Lipase WNL at 36. PT/INR 10.8/1.0. Lactic acid WNL at 1.2. UA unremarkable. ESR 23 and blood culture x 2 pending.      Also of note patient is very tearful and upset upon interview with medical student earlier today and also upon interview currently and admits she had tried celexa in the past for depression and helping to cope with her physical illness and she thinks she may need this again.    Recommendations:  - check c.diff, stool culture, fecal lactoferrin   - check urine culture and blood culture  - would give patient a full liquid diet and advance as tolerated   - IVFs and antiemetics  - would minimize the use of opiate pain medications and try to use tylenol and tramadol if needed   - consider restarting celexa, patient was on this in the past with good response  - will discuss patients case with Dr. Ross on Monday and decide on any further treatment options.  Would like to avoid steroids given normal CRP, relatively benign abdominal exam with normal labs currently, and would certainly like to have infectious workup first, including C.diff studies given recent antibiotic use and previous history of C.diff, however it was recently negative 9 days ago        Thank you for your consult. I will follow-up with patient. Please contact us if you have any additional questions.    Fausto Landry MD  Gastroenterology Fellow, PGY 6  [facility]

## 2017-08-04 NOTE — ED PROVIDER NOTES
Encounter Date: 8/4/2017    SCRIBE #1 NOTE: I, Yaa Mcgovern, am scribing for, and in the presence of,  Dr. Pedraza. I have scribed the following portions of the note - the APC attestation.       History     Chief Complaint   Patient presents with    Multiple Complaints     abd pain, diarrhea, redness and blistering around ostomy site/ multiple bruising- nausea, vomiting     37-year-old white female with past medical history of Crohn's disease with ileostomy, nephrolithiasis, C. difficile, recurrent UTI, hypertension presents to the ED complaining of abdominal pain.  She is followed by gastroenterology, Dr. Ross and had her medications changed to Stelara on 7/11/17.  Since then, she reports that she has been feeling poorly.  She reports fevers (last night 100.7°F), easy bruising, shortness of breath, nausea without vomiting, headache, lightheadedness, 7/10 abdominal pain that is not relieved with at-home Percocet.  She's had increased output in her ostomy that is very watery.  This has caused severe irritation of her stoma and the skin around her stoma.  She has noticed some BRB in her ostomy bag but denies any melena.  She was treated for ESBL UTI with Macrobid, ciprofloxacin, then Augmentin (last one week ago).  She reports some continuing dysuria and lower back pain.  She is receiving IV fluids weekly for dehydration and received 2 L on 8/1/17.  She denies chest pain, hematuria, syncope, URI symptoms.  She socially drinks alcohol and denies tobacco or drug use.      The history is provided by the patient.     Review of patient's allergies indicates:   Allergen Reactions    Azathioprine sodium      Other reaction(s): pancreatitis  Other reaction(s): pancreatitis    Methotrexate      Other reaction(s): infection-    Morphine Itching and Other (See Comments)     Other reaction(s): Itching     Past Medical History:   Diagnosis Date    Abnormal Pap smear 2007    Abnormal Pap smear 5/26/2011    Anemia      Anxiety     Arthritis     C. difficile diarrhea     Crohn's disease     Depression 8/5/2017    Encounter for blood transfusion     Genital HSV     History of colposcopy with cervical biopsy 2007 and 7/2011 2007-LYLA I  and 7/2011- LYLA I    Hypertension     Kidney stone     Recurrent UTI 4/3/2013    S/P ileostomy 7/9/2012    Sterilization 6/23/2012     Past Surgical History:   Procedure Laterality Date    ABDOMINAL SURGERY      APPENDECTOMY      BLADDER SURGERY      partial cystectomy due to fistula    CKC      COLON SURGERY      COLONOSCOPY      HYSTERECTOMY  04/16/2015    SHELLIE    ILEOSTOMY      OOPHORECTOMY Right 04/16/2015    PORTACATH PLACEMENT      SKIN BIOPSY      SMALL INTESTINE SURGERY      TOTAL COLECTOMY      TUBAL LIGATION  06 06 2012    UPPER GASTROINTESTINAL ENDOSCOPY       Family History   Problem Relation Age of Onset    Colon cancer Father     Cancer Father      colon cancer    Hypertension Mother     Cancer Maternal Grandfather      esopghal     Skin cancer Maternal Grandfather     Endometrial cancer Maternal Aunt     Crohn's disease Brother     Breast cancer Neg Hx     Ovarian cancer Neg Hx      Social History   Substance Use Topics    Smoking status: Never Smoker    Smokeless tobacco: Never Used    Alcohol use 0.0 oz/week      Comment: Patient only drinks wine not regular basis.     Review of Systems   Constitutional: Positive for fever.   HENT: Negative for congestion, rhinorrhea and sore throat.    Eyes: Negative for photophobia and visual disturbance.   Respiratory: Positive for shortness of breath. Negative for cough.    Cardiovascular: Negative for chest pain.   Gastrointestinal: Positive for abdominal pain, blood in stool, diarrhea and nausea. Negative for vomiting.   Genitourinary: Positive for dysuria. Negative for hematuria and vaginal bleeding.   Musculoskeletal: Positive for back pain. Negative for neck pain and neck stiffness.   Skin: Negative for  rash and wound.   Neurological: Positive for light-headedness and headaches. Negative for dizziness, syncope, weakness and numbness.   Hematological: Bruises/bleeds easily.   Psychiatric/Behavioral: Negative for confusion.       Physical Exam     Initial Vitals [08/04/17 1109]   BP Pulse Resp Temp SpO2   (!) 139/93 98 17 98.6 °F (37 °C) 100 %      MAP       108.33         Physical Exam    Nursing note and vitals reviewed.  Constitutional: She appears well-developed and well-nourished. She is not diaphoretic. No distress.   HENT:   Head: Normocephalic and atraumatic.   Neck: Normal range of motion. Neck supple.   Cardiovascular: Regular rhythm and normal heart sounds. Tachycardia present.  Exam reveals no gallop and no friction rub.    No murmur heard.  Pulmonary/Chest: Breath sounds normal. She has no wheezes. She has no rhonchi. She has no rales.   Abdominal: Soft. Bowel sounds are normal. There is tenderness. There is no rebound and no guarding.   Ostomy noted in R abdomen   Musculoskeletal: Normal range of motion.   Neurological: She is alert and oriented to person, place, and time.   Skin: Skin is warm and dry. Bruising noted. No rash noted. No erythema.   Psychiatric: She has a normal mood and affect.         ED Course   Procedures  Labs Reviewed   COMPREHENSIVE METABOLIC PANEL - Abnormal; Notable for the following:        Result Value    CO2 21 (*)     All other components within normal limits   URINALYSIS, REFLEX TO URINE CULTURE - Abnormal; Notable for the following:     Occult Blood UA 1+ (*)     Leukocytes, UA Trace (*)     All other components within normal limits    Narrative:     Preferred Collection Type->Urine, Clean Catch   SEDIMENTATION RATE, MANUAL - Abnormal; Notable for the following:     Sed Rate 23 (*)     All other components within normal limits   URINALYSIS MICROSCOPIC - Abnormal; Notable for the following:     WBC, UA 6 (*)     All other components within normal limits    Narrative:      Preferred Collection Type->Urine, Clean Catch   CBC W/ AUTO DIFFERENTIAL   C-REACTIVE PROTEIN   LIPASE   PROTIME-INR   LACTIC ACID, PLASMA        Imaging Results          X-Ray Abdomen Flat And Erect (Final result)  Result time 08/04/17 14:25:37    Final result by Shaila Perez MD (08/04/17 14:25:37)                 Impression:      The abdomen is relatively gasless, a finding that can be seen with a vomiting or diarrhea.  The source of the patient's symptoms is not evident to me.  Clinical considerations will determine if CT of the abdomen and pelvis is warranted.       Electronically signed by: Sahila Perez MD  Date:     08/04/17  Time:    14:25              Narrative:    Time of Procedure: 08/04/17 12:15:00  Accession # [id number]    Comparison: None.    Number of images: 2.    Findings:  Supine and upright views of the abdomen reveal relatively gasless abdomen.    There is no bowel distention, intramural gas, intraportal gas or free peritoneal gas and no unusual calcification, mass effect or significant osseous abnormality. The imaged portions of the lungs appear clear.                                 Medical Decision Making:   History:   Old Medical Records: I decided to obtain old medical records.  Clinical Tests:   Lab Tests: Ordered and Reviewed  Radiological Study: Ordered and Reviewed  Other:   I have discussed this case with another health care provider.       APC / Resident Notes:   37-year-old white female with past medical history of Crohn's disease with ileostomy, nephrolithiasis, C. difficile, recurrent UTI, hypertension presents to the ED complaining of abdominal pain.  Tachycardic.  Regular rate and rhythm without murmurs.  Lungs are clear to auscultation without wheezes.  Abdomen is soft with generalized tenderness to palpation.  Ostomy noted in right lower abdomen.  No CVA tenderness.  Bruising noted.  Neurologically intact.  Differential diagnosis includes but is not limited to C. difficile  colitis, bowel obstruction, chronic abdominal pain, Crohn's flare, dehydration, electrolyte abnormality, acute kidney injury, UTI, sepsis.  Will obtain labs, x-ray abdomen flat and erect, give IV fluids, Dilaudid, and Zofran and reassess.    CBC with no leukocytosis with WBC 7.61. CMP with no acute abnormalities. CRP WNL at 0.9. Lipase WNL at 36. PT/INR 10.8/1.0. Lactic acid WNL at 1.2. UA with no infection. ESR and blood culture x 2 pending.    Xray abdomen flat and erect: The abdomen is relatively gasless, a finding that can be seen with a vomiting or diarrhea.  The source of the patient's symptoms is not evident to me.    She has minimal improvement with treatments provided in the ED. Will place in observation.     Spoke with GI they will see the patient while she is in the hospital.        Scribe Attestation:   Scribe #1: I performed the above scribed service and the documentation accurately describes the services I performed. I attest to the accuracy of the note.    Attending Attestation:     Physician Attestation Statement for NP/PA:   I have conducted a face to face encounter with this patient in addition to the NP/PA, due to Medical Complexity    Other NP/PA Attestation Additions:    History of Present Illness: Hx as per APC. Pt describes 12 pound weight loss in the last several weeks and increased output through stoma with irritation around stoma related to that (has a photo of that on her phone). She was recently placed on stelara but was told it could take 6 months. Pt indicates she doesn't think she can wait 6 months. Also reports fever last night of 100.7.    Physical Exam: Pt is teary, emotionally upset, and seems depressed.   Skin: pale, warm, and dry. HENT: atraumatic, pharynx clear, moist mucous membranes. Eyes: conjunctiva clear, PERRL, EOMI. Neck: supple, no adenopathy, no JVD, non-tender. Cardio: regular rate and rhythm, no murmur or gallop. Lungs: clear to auscultation bilaterally. Abdomen:  "soft, mild to moderate diffuse tenderness, no rebound or guarding, active bowel sounds, no cellulitis in region of stoma but clearly see irritation of the skin. MSK: extremities non-tender, pulses 2 +. Neuro: alert and oriented x 3, cranial nerves intact, gross motor and sensory intact.      Medical Decision Making: Labs are normal as she indicates "they always are." I am concerned that this pt is running out of reserve and with 12 pound weight loss will not be able to manage at home. She currently weighs 94 pounds. Pt will be placed in observation with GI tract rest and will have further evaluation by GI in the hospital.     Abdominal xray: Relatively gasless, no evidence for intestinal obstruction or free air. No soft tissue abnormality.        Physician Attestation for Scribe:  Physician Attestation Statement for Scribe #1: I, Dr. Pedraza, reviewed documentation, as scribed by Yaa Mcgovern in my presence, and it is both accurate and complete.                 ED Course     Clinical Impression:   The primary encounter diagnosis was Abdominal pain, unspecified location. Diagnoses of Nausea and Exacerbation of Crohn's disease, unspecified complication were also pertinent to this visit.    Disposition:   Disposition: Placed in Observation  Condition: Lakesha Winston PA-C  08/04/17 1816       Elvin Pedraza MD  08/05/17 1540    "

## 2017-08-04 NOTE — SUBJECTIVE & OBJECTIVE
Past Medical History:   Diagnosis Date    Abnormal Pap smear 2007    Abnormal Pap smear 5/26/2011    Anemia     Anxiety     Arthritis     C. difficile diarrhea     Crohn's disease     Encounter for blood transfusion     Genital HSV     History of colposcopy with cervical biopsy 2007 and 7/2011 2007-LYLA I  and 7/2011- LYLA I    Hypertension     Kidney stone     Recurrent UTI 4/3/2013    S/P ileostomy 7/9/2012    Sterilization 6/23/2012       Past Surgical History:   Procedure Laterality Date    ABDOMINAL SURGERY      APPENDECTOMY      BLADDER SURGERY      partial cystectomy due to fistula    CKC      COLON SURGERY      COLONOSCOPY      HYSTERECTOMY  04/16/2015    SHELLIE    ILEOSTOMY      OOPHORECTOMY Right 04/16/2015    PORTACATH PLACEMENT      SKIN BIOPSY      SMALL INTESTINE SURGERY      TOTAL COLECTOMY      TUBAL LIGATION  06 06 2012    UPPER GASTROINTESTINAL ENDOSCOPY         Review of patient's allergies indicates:   Allergen Reactions    Azathioprine sodium      Other reaction(s): pancreatitis  Other reaction(s): pancreatitis    Methotrexate      Other reaction(s): infection-    Morphine Itching and Other (See Comments)     Other reaction(s): Itching       No current facility-administered medications on file prior to encounter.      Current Outpatient Prescriptions on File Prior to Encounter   Medication Sig    alprazolam (XANAX) 0.5 MG tablet TAKE 1 TABLET (0.5 MG TOTAL) BY MOUTH 2 (TWO) TIMES DAILY.    amoxicillin-clavulanate 875-125mg (AUGMENTIN) 875-125 mg per tablet Take 1 tablet by mouth every 12 (twelve) hours.    certolizumab pegol (CIMZIA) 400 mg/2 mL (200 mg/mL x 2) SyKt kit Inject 400 mg (2 mLs total) into the skin every 28 days.    diphenoxylate-atropine 2.5-0.025 mg (LOMOTIL) 2.5-0.025 mg per tablet Take 1 tablet by mouth 3 (three) times daily as needed for Diarrhea.    fluconazole (DIFLUCAN) 100 MG tablet Take 1 tablet (100 mg total) by mouth once daily.     lisinopril 10 MG tablet TAKE 1 TABLET (10 MG TOTAL) BY MOUTH ONCE DAILY.    norethindrone-ethinyl estradiol (GILDESS FE 1/20, 28,) 1 mg-20 mcg (21)/75 mg (7) per tablet Take 1 tablet by mouth once daily. Take one active pill daily continuously.    ondansetron (ZOFRAN-ODT) 8 MG TbDL Take 1 tablet (8 mg total) by mouth every 8 (eight) hours as needed (nausea).    oxycodone-acetaminophen (PERCOCET)  mg per tablet Take 1 tablet by mouth every 6 (six) hours as needed for Pain.    promethazine (PHENERGAN) 25 MG tablet TAKE 1 TABLET BY MOUTH EVERY 6 HOURS AS NEEDED FOR NAUSEA    sumatriptan (IMITREX) 100 MG tablet Take 1 tablet (100 mg total) by mouth every 2 (two) hours as needed for Migraine (do not exceed 2 doses in 24 hours).    valacyclovir (VALTREX) 500 MG tablet Take 1 tablet (500 mg total) by mouth once daily.    zolpidem (AMBIEN) 10 mg Tab TAKE 1 TABLET BY MOUTH AT BEDTIME     Family History     Problem Relation (Age of Onset)    Cancer Father, Maternal Grandfather    Colon cancer Father    Crohn's disease Brother    Endometrial cancer Maternal Aunt    Hypertension Mother    Skin cancer Maternal Grandfather        Social History Main Topics    Smoking status: Never Smoker    Smokeless tobacco: Never Used    Alcohol use 0.0 oz/week      Comment: Patient only drinks wine not regular basis.    Drug use: No    Sexual activity: Yes     Partners: Male     Birth control/ protection: Pill, Surgical, Condom      Comment: HYST     Review of Systems   Constitutional: Positive for fever and unexpected weight change. Negative for chills and diaphoresis.   HENT: Negative for congestion, facial swelling, trouble swallowing and voice change.    Eyes: Negative for photophobia, pain, redness and visual disturbance.   Respiratory: Positive for shortness of breath. Negative for cough, chest tightness and wheezing.    Cardiovascular: Negative for chest pain, palpitations and leg swelling.   Gastrointestinal: Positive  for abdominal pain, blood in stool, diarrhea and nausea.   Endocrine: Negative for cold intolerance, heat intolerance, polydipsia and polyuria.   Genitourinary: Positive for dysuria. Negative for flank pain, hematuria and pelvic pain.   Musculoskeletal: Positive for back pain. Negative for arthralgias, myalgias and neck pain.   Skin: Negative for color change, pallor, rash and wound.   Neurological: Positive for light-headedness and headaches. Negative for dizziness, tremors and weakness.   Hematological: Negative for adenopathy. Bruises/bleeds easily (not new).   Psychiatric/Behavioral: Negative for confusion, dysphoric mood and sleep disturbance. The patient is not nervous/anxious.      Objective:     Vital Signs (Most Recent):  Temp: 98.6 °F (37 °C) (08/04/17 1109)  Pulse: 74 (08/04/17 1431)  Resp: 17 (08/04/17 1109)  BP: (!) 139/98 (08/04/17 1431)  SpO2: 99 % (08/04/17 1431) Vital Signs (24h Range):  Temp:  [98.6 °F (37 °C)] 98.6 °F (37 °C)  Pulse:  [74-98] 74  Resp:  [17] 17  SpO2:  [97 %-100 %] 99 %  BP: (134-139)/(85-98) 139/98     Weight: 42.6 kg (94 lb)  Body mass index is 17.76 kg/m².    Physical Exam   Constitutional: She is oriented to person, place, and time. She appears well-developed and well-nourished.   HENT:   Head: Normocephalic and atraumatic.   Eyes: EOM are normal. Pupils are equal, round, and reactive to light.   Neck: Normal range of motion. Neck supple. No tracheal deviation present. No thyromegaly present.   Cardiovascular: Normal rate and regular rhythm.    No murmur heard.  Pulmonary/Chest: Effort normal and breath sounds normal. She has no wheezes.   Abdominal: Soft. Bowel sounds are normal. There is tenderness.   +ostomy in place   Musculoskeletal: Normal range of motion. She exhibits no edema or tenderness.   Lymphadenopathy:     She has no cervical adenopathy.   Neurological: She is alert and oriented to person, place, and time. She has normal reflexes. No cranial nerve deficit.    Skin: Skin is warm and dry.   Psychiatric: She has a normal mood and affect. Her behavior is normal.   Nursing note and vitals reviewed.       Significant Labs: All pertinent labs within the past 24 hours have been reviewed.    Significant Imaging: I have reviewed all pertinent imaging results/findings within the past 24 hours.

## 2017-08-04 NOTE — ASSESSMENT & PLAN NOTE
36 y/o F with h/o small bowel and colonic Crohn's since she was 9yo s/p surgery 5 years ago with colectomy and rectum removed. She was doing well and in remission until April 2017, at which time she saw Dr. Ross in clinic, it was noted that she had nausea, vomiting intermittently, abdominal pain and increased ostomy output associated with weight loss, she had been on cimzia for several years, MR enterography suggested active disease in the small bowel and the decision was made to switch to Stelera, with the first dose being given 7/13/17, and she states that basically she has still been having the same symptoms since April, also pyelonephritis and was given a shot of rocephin and outpatient augmentin, which she only took about 4-5 days of and was really able to tolerate more secondary to nausea.      Pt presents to the ED now with fevers at home (last night 100.7°F),  SOB, nausea without vomiting, headache, lightheadedness, 7/10 abdominal pain that is not relieved with at-home Percocet.  Pt reports increased output in her ostomy that is very watery and causing severe irritation of her stoma and the skin around her stoma.  She reports some continuing dysuria and lower back pain at times.      In the ED, CBC with WBC 7.61. CMP with no acute abnormalities. CRP WNL at 0.9. Lipase WNL at 36. PT/INR 10.8/1.0. Lactic acid WNL at 1.2. UA unremarkable. ESR 23 and blood culture x 2 pending.      Also of note patient is very tearful and upset upon interview with medical student earlier today and also upon interview currently and admits she had tried celexa in the past for depression and helping to cope with her physical illness and she thinks she may need this again.    Recommendations:  - check c.diff, stool culture, fecal lactoferrin   - check urine culture and blood culture  - would give patient a full liquid diet and advance as tolerated   - IVFs and antiemetics  - would minimize the use of opiate pain medications and try  to use tylenol and tramadol if needed   - consider restarting celexa, patient was on this in the past with good response  - will discuss patients case with Dr. Ross on Monday and decide on any further treatment options.  Would like to avoid steroids given normal CRP, relatively benign abdominal exam with normal labs currently, and would certainly like to have infectious workup first, including C.diff studies given recent antibiotic use and previous history of C.diff, however it was recently negative 9 days ago

## 2017-08-04 NOTE — HOSPITAL COURSE
Pt admitted to observation for fever, increased abdominal pain and output from ostomy.   CBC with no leukocytosis with WBC 7.61. CMP with no acute abnormalities. CRP WNL at 0.9. Lipase WNL at 36. PT/INR 10.8/1.0. Lactic acid WNL at 1.2. UA unremarkable. ESR 23 and blood culture x 2 NGTD.  GI consulted.  WC consulted.  GI recommended checking CDiff (negative), stool culture (prelim no growth), fecal lactoferrin (in process).  Restarted celexa, pt declined psych consult.  Dr. Ross to assess on Monday.  8/7 pt to start 1/2 prep and NPO after midnight for ileoscopy on 8/8.  Pt c/o dysuria and third UA ordered although second UA clear.  8/8: Ileoscopy today; findings show widely patent end ileostomy found with healthy appearing mucosa; no other significant abnormalities were identified. Full liquid diet; advance as tolerated. Weaning opioids as tolerated.   8/9: Limited PO intake. Weaning opioids. Added Elavil qhs.  8/10: Biopsy results show unremarkable small bowel mucosa; no evidence of active colitis, granuloma, dysplasia or malignancy. Pt discharged home on outpatient regimen and Elavil. Pt to follow up with PCP and GI as an outpatient.

## 2017-08-04 NOTE — SUBJECTIVE & OBJECTIVE
Past Medical History:   Diagnosis Date    Abnormal Pap smear 2007    Abnormal Pap smear 5/26/2011    Anemia     Anxiety     Arthritis     C. difficile diarrhea     Crohn's disease     Encounter for blood transfusion     Genital HSV     History of colposcopy with cervical biopsy 2007 and 7/2011 2007-LYLA I  and 7/2011- LYLA I    Hypertension     Kidney stone     Recurrent UTI 4/3/2013    S/P ileostomy 7/9/2012    Sterilization 6/23/2012       Past Surgical History:   Procedure Laterality Date    ABDOMINAL SURGERY      APPENDECTOMY      BLADDER SURGERY      partial cystectomy due to fistula    CKC      COLON SURGERY      COLONOSCOPY      HYSTERECTOMY  04/16/2015    SHELLIE    ILEOSTOMY      OOPHORECTOMY Right 04/16/2015    PORTACATH PLACEMENT      SKIN BIOPSY      SMALL INTESTINE SURGERY      TOTAL COLECTOMY      TUBAL LIGATION  06 06 2012    UPPER GASTROINTESTINAL ENDOSCOPY         Review of patient's allergies indicates:   Allergen Reactions    Azathioprine sodium      Other reaction(s): pancreatitis  Other reaction(s): pancreatitis    Methotrexate      Other reaction(s): infection-    Morphine Itching and Other (See Comments)     Other reaction(s): Itching     Family History     Problem Relation (Age of Onset)    Cancer Father, Maternal Grandfather    Colon cancer Father    Crohn's disease Brother    Endometrial cancer Maternal Aunt    Hypertension Mother    Skin cancer Maternal Grandfather        Social History Main Topics    Smoking status: Never Smoker    Smokeless tobacco: Never Used    Alcohol use 0.0 oz/week      Comment: Patient only drinks wine not regular basis.    Drug use: No    Sexual activity: Yes     Partners: Male     Birth control/ protection: Pill, Surgical, Condom      Comment: HYST     Review of Systems   Constitutional: Positive for appetite change, fatigue, fever and unexpected weight change. Negative for chills.   HENT: Negative for sore throat and trouble  swallowing.    Respiratory: Negative for cough and shortness of breath.    Cardiovascular: Negative for chest pain and palpitations.   Gastrointestinal: Positive for abdominal pain, diarrhea and nausea. Negative for abdominal distention and vomiting.   Genitourinary: Positive for dysuria.   Skin: Negative for pallor and rash.   Hematological: Bruises/bleeds easily.   Psychiatric/Behavioral: Negative for agitation and behavioral problems.     Objective:     Vital Signs (Most Recent):  Temp: 98.6 °F (37 °C) (08/04/17 1109)  Pulse: 74 (08/04/17 1431)  Resp: 17 (08/04/17 1109)  BP: (!) 139/98 (08/04/17 1431)  SpO2: 99 % (08/04/17 1431) Vital Signs (24h Range):  Temp:  [98.6 °F (37 °C)] 98.6 °F (37 °C)  Pulse:  [74-98] 74  Resp:  [17] 17  SpO2:  [97 %-100 %] 99 %  BP: (134-139)/(85-98) 139/98     Weight: 42.8 kg (94 lb 5.7 oz) (08/04/17 1600)  Body mass index is 17.83 kg/m².      Intake/Output Summary (Last 24 hours) at 08/04/17 1758  Last data filed at 08/04/17 1600   Gross per 24 hour   Intake                0 ml   Output                0 ml   Net                0 ml       Lines/Drains/Airways     Central Venous Catheter Line                 Port A Cath Single Lumen 02/21/17 0820 left subclavian 164 days          Drain                 Colostomy RLQ 61997 days         Ileostomy  89859 days         Ileostomy RUQ 86009 days                Physical Exam   Constitutional: She is oriented to person, place, and time. She appears well-developed and well-nourished.   HENT:   Head: Normocephalic and atraumatic.   Eyes: Conjunctivae are normal. No scleral icterus.   Neck: Normal range of motion. Neck supple.   Cardiovascular: Normal rate and regular rhythm.  Exam reveals no friction rub.    No murmur heard.  Pulmonary/Chest: Effort normal and breath sounds normal. No respiratory distress. She has no wheezes.   Abdominal: Soft. Bowel sounds are normal. There is no rebound and no guarding.   Mildly tender diffusely to deep  palpation, ostomy bag in place with brown stool     Neurological: She is alert and oriented to person, place, and time.   Skin: Skin is warm and dry.   Psychiatric: She has a normal mood and affect. Her behavior is normal.       Significant Labs:  All pertinent lab results from the last 24 hours have been reviewed.    Significant Imaging:  Imaging results within the past 24 hours have been reviewed.

## 2017-08-04 NOTE — ASSESSMENT & PLAN NOTE
BP controlled on admission  Will hold lisinopril at this time based on increased output  Will continue to monitor

## 2017-08-05 PROBLEM — F32.A DEPRESSION: Status: ACTIVE | Noted: 2017-08-05

## 2017-08-05 LAB
ANION GAP SERPL CALC-SCNC: 7 MMOL/L
BASOPHILS # BLD AUTO: 0.01 K/UL
BASOPHILS NFR BLD: 0.2 %
BUN SERPL-MCNC: 5 MG/DL
CALCIUM SERPL-MCNC: 8.3 MG/DL
CHLORIDE SERPL-SCNC: 111 MMOL/L
CO2 SERPL-SCNC: 22 MMOL/L
CREAT SERPL-MCNC: 0.7 MG/DL
DIFFERENTIAL METHOD: ABNORMAL
EOSINOPHIL # BLD AUTO: 0.1 K/UL
EOSINOPHIL NFR BLD: 1.3 %
ERYTHROCYTE [DISTWIDTH] IN BLOOD BY AUTOMATED COUNT: 13.3 %
EST. GFR  (AFRICAN AMERICAN): >60 ML/MIN/1.73 M^2
EST. GFR  (NON AFRICAN AMERICAN): >60 ML/MIN/1.73 M^2
GLUCOSE SERPL-MCNC: 99 MG/DL
HCT VFR BLD AUTO: 34.7 %
HGB BLD-MCNC: 11.5 G/DL
LYMPHOCYTES # BLD AUTO: 1.9 K/UL
LYMPHOCYTES NFR BLD: 30.3 %
MAGNESIUM SERPL-MCNC: 1.5 MG/DL
MCH RBC QN AUTO: 30.5 PG
MCHC RBC AUTO-ENTMCNC: 33.1 G/DL
MCV RBC AUTO: 92 FL
MONOCYTES # BLD AUTO: 0.7 K/UL
MONOCYTES NFR BLD: 11.3 %
NEUTROPHILS # BLD AUTO: 3.5 K/UL
NEUTROPHILS NFR BLD: 56.7 %
PHOSPHATE SERPL-MCNC: 3.5 MG/DL
PLATELET # BLD AUTO: 233 K/UL
PMV BLD AUTO: 9.2 FL
POTASSIUM SERPL-SCNC: 4 MMOL/L
RBC # BLD AUTO: 3.77 M/UL
SODIUM SERPL-SCNC: 140 MMOL/L
WBC # BLD AUTO: 6.11 K/UL

## 2017-08-05 PROCEDURE — 83735 ASSAY OF MAGNESIUM: CPT

## 2017-08-05 PROCEDURE — 11000001 HC ACUTE MED/SURG PRIVATE ROOM

## 2017-08-05 PROCEDURE — 80048 BASIC METABOLIC PNL TOTAL CA: CPT

## 2017-08-05 PROCEDURE — 84100 ASSAY OF PHOSPHORUS: CPT

## 2017-08-05 PROCEDURE — 99226 PR SUBSEQUENT OBSERVATION CARE,LEVEL III: CPT | Mod: ,,, | Performed by: PHYSICIAN ASSISTANT

## 2017-08-05 PROCEDURE — 85025 COMPLETE CBC W/AUTO DIFF WBC: CPT

## 2017-08-05 PROCEDURE — 99214 OFFICE O/P EST MOD 30 MIN: CPT | Mod: ,,, | Performed by: INTERNAL MEDICINE

## 2017-08-05 PROCEDURE — 63600175 PHARM REV CODE 636 W HCPCS: Performed by: PHYSICIAN ASSISTANT

## 2017-08-05 PROCEDURE — 25000003 PHARM REV CODE 250: Performed by: PHYSICIAN ASSISTANT

## 2017-08-05 PROCEDURE — G0378 HOSPITAL OBSERVATION PER HR: HCPCS

## 2017-08-05 RX ORDER — HYDROCODONE BITARTRATE AND ACETAMINOPHEN 5; 325 MG/1; MG/1
1 TABLET ORAL EVERY 4 HOURS PRN
Status: DISCONTINUED | OUTPATIENT
Start: 2017-08-05 | End: 2017-08-08

## 2017-08-05 RX ORDER — MAGNESIUM SULFATE HEPTAHYDRATE 40 MG/ML
2 INJECTION, SOLUTION INTRAVENOUS ONCE
Status: COMPLETED | OUTPATIENT
Start: 2017-08-05 | End: 2017-08-05

## 2017-08-05 RX ADMIN — HYDROMORPHONE HYDROCHLORIDE 1 MG: 1 INJECTION, SOLUTION INTRAMUSCULAR; INTRAVENOUS; SUBCUTANEOUS at 09:08

## 2017-08-05 RX ADMIN — HYDROMORPHONE HYDROCHLORIDE 1 MG: 1 INJECTION, SOLUTION INTRAMUSCULAR; INTRAVENOUS; SUBCUTANEOUS at 02:08

## 2017-08-05 RX ADMIN — MAGNESIUM SULFATE IN WATER 2 G: 40 INJECTION, SOLUTION INTRAVENOUS at 09:08

## 2017-08-05 RX ADMIN — CITALOPRAM HYDROBROMIDE 20 MG: 20 TABLET ORAL at 08:08

## 2017-08-05 RX ADMIN — ONDANSETRON 4 MG: 2 INJECTION INTRAMUSCULAR; INTRAVENOUS at 02:08

## 2017-08-05 RX ADMIN — HYDROCODONE BITARTRATE AND ACETAMINOPHEN 1 TABLET: 5; 325 TABLET ORAL at 05:08

## 2017-08-05 RX ADMIN — PROMETHAZINE HYDROCHLORIDE 12.5 MG: 12.5 TABLET ORAL at 09:08

## 2017-08-05 RX ADMIN — ALPRAZOLAM 0.5 MG: 0.5 TABLET ORAL at 08:08

## 2017-08-05 RX ADMIN — HYDROMORPHONE HYDROCHLORIDE 1 MG: 1 INJECTION, SOLUTION INTRAMUSCULAR; INTRAVENOUS; SUBCUTANEOUS at 06:08

## 2017-08-05 RX ADMIN — PROMETHAZINE HYDROCHLORIDE 12.5 MG: 12.5 TABLET ORAL at 01:08

## 2017-08-05 RX ADMIN — DIPHENOXYLATE HYDROCHLORIDE AND ATROPINE SULFATE 1 TABLET: 2.5; .025 TABLET ORAL at 06:08

## 2017-08-05 RX ADMIN — ZOLPIDEM TARTRATE 10 MG: 5 TABLET, FILM COATED ORAL at 09:08

## 2017-08-05 RX ADMIN — ONDANSETRON 4 MG: 2 INJECTION INTRAMUSCULAR; INTRAVENOUS at 11:08

## 2017-08-05 RX ADMIN — ONDANSETRON 4 MG: 2 INJECTION INTRAMUSCULAR; INTRAVENOUS at 05:08

## 2017-08-05 RX ADMIN — HYDROMORPHONE HYDROCHLORIDE 1 MG: 1 INJECTION, SOLUTION INTRAMUSCULAR; INTRAVENOUS; SUBCUTANEOUS at 01:08

## 2017-08-05 RX ADMIN — PROMETHAZINE HYDROCHLORIDE 12.5 MG: 12.5 TABLET ORAL at 06:08

## 2017-08-05 RX ADMIN — HYDROMORPHONE HYDROCHLORIDE 1 MG: 1 INJECTION, SOLUTION INTRAMUSCULAR; INTRAVENOUS; SUBCUTANEOUS at 11:08

## 2017-08-05 RX ADMIN — HYDROMORPHONE HYDROCHLORIDE 1 MG: 1 INJECTION, SOLUTION INTRAMUSCULAR; INTRAVENOUS; SUBCUTANEOUS at 05:08

## 2017-08-05 RX ADMIN — ALPRAZOLAM 0.5 MG: 0.5 TABLET ORAL at 09:08

## 2017-08-05 RX ADMIN — HYDROCODONE BITARTRATE AND ACETAMINOPHEN 1 TABLET: 5; 325 TABLET ORAL at 09:08

## 2017-08-05 NOTE — PROGRESS NOTES
[facility] Hospital Medicine  Progress Note    Patient Name: Ivy Salgado  MRN: [id number]  Patient Class: OP- Observation   Admission Date: 8/4/2017  Length of Stay: 0 days  Attending Physician: Ant Bourgeois MD  Primary Care Provider: Kalia Astorga MD    [facility] Medicine Team: [facility] HOSP MED F Anne-Marie Calle PA-C    Subjective:     Principal Problem:Abdominal pain    HPI:  38 y/o F with h/o Crohn's since she was 9yo s/p surgery 5 years ago for colectomy and rectum removed. She was doing well and in remission until April 2017. She follow with Dr. Ross and was considering starting Stelera.  Pt presented to the ED with fevers (last night 100.7°F),  SOB, nausea without vomiting, headache, lightheadedness, 7/10 abdominal pain that is not relieved with at-home Percocet.   Pt reports increased output in her ostomy that is very watery.  This is cause severe irritation of her stoma and the skin around her stoma. She has noticed some BRB in her ostomy bag but denies any melena.  She was treated for ESBL UTI with Macrobid, ciprofloxacin, then Augmentin (last one week ago).  She reports some continuing dysuria and lower back pain.  She is receiving IV fluids weekly for dehydration and received 2 L on 8/1/17.    In the ED, CBC with no leukocytosis with WBC 7.61. CMP with no acute abnormalities. CRP WNL at 0.9. Lipase WNL at 36. PT/INR 10.8/1.0. Lactic acid WNL at 1.2. UA unremarkable. ESR 23 and blood culture x 2 pending.  GI consulted.      Hospital Course:  Pt admitted to observation for fever, increased abdominal pain and output from ostomy.   CBC with no leukocytosis with WBC 7.61. CMP with no acute abnormalities. CRP WNL at 0.9. Lipase WNL at 36. PT/INR 10.8/1.0. Lactic acid WNL at 1.2. UA unremarkable. ESR 23 and blood culture x 2 NGTD.  GI consulted.  WC consulted.  GI recommended checking CDiff (negative), stool culture (in process), fecal lactoferrin (in process).  Restarted celexa, pt  declined psych consult.  Dr. Ross to assess on Monday.        Interval History: no acute events overnight.  Pt states pain and output the same, no improvement.  Pt tearful this morning.  Pt declined psych consult.    Review of Systems   Constitutional: Positive for unexpected weight change. Negative for chills, diaphoresis and fever.   HENT: Negative for congestion, facial swelling, trouble swallowing and voice change.    Respiratory: Positive for shortness of breath. Negative for cough, chest tightness and wheezing.    Cardiovascular: Negative for chest pain, palpitations and leg swelling.   Gastrointestinal: Positive for abdominal pain, blood in stool, diarrhea and nausea.   Endocrine: Negative for cold intolerance, heat intolerance, polydipsia and polyuria.   Genitourinary: Positive for dysuria. Negative for flank pain, hematuria and pelvic pain.   Musculoskeletal: Positive for back pain. Negative for arthralgias, myalgias and neck pain.   Skin: Negative for color change, pallor, rash and wound.   Neurological: Positive for light-headedness and headaches. Negative for dizziness, tremors and weakness.   Hematological: Negative for adenopathy. Bruises/bleeds easily (not new).   Psychiatric/Behavioral: Negative for confusion, dysphoric mood and sleep disturbance. The patient is not nervous/anxious.      Objective:     Vital Signs (Most Recent):  Temp: 98.4 °F (36.9 °C) (08/05/17 1207)  Pulse: 71 (08/05/17 1207)  Resp: 16 (08/05/17 0816)  BP: (!) 143/80 (08/05/17 1207)  SpO2: 97 % (08/05/17 1207) Vital Signs (24h Range):  Temp:  [97.7 °F (36.5 °C)-99.3 °F (37.4 °C)] 98.4 °F (36.9 °C)  Pulse:  [71-84] 71  Resp:  [16-18] 16  SpO2:  [95 %-99 %] 97 %  BP: (117-159)/(65-98) 143/80     Weight: 42.8 kg (94 lb 5.7 oz)  Body mass index is 17.83 kg/m².    Intake/Output Summary (Last 24 hours) at 08/05/17 1337  Last data filed at 08/05/17 0914   Gross per 24 hour   Intake             1415 ml   Output             2150 ml   Net              -735 ml      Physical Exam   Constitutional: She is oriented to person, place, and time. She appears well-developed and well-nourished.   HENT:   Head: Normocephalic and atraumatic.   Eyes: EOM are normal. Pupils are equal, round, and reactive to light.   Neck: Normal range of motion. Neck supple.   Cardiovascular: Normal rate and regular rhythm.    Pulmonary/Chest: Effort normal and breath sounds normal.   Abdominal: Soft. Bowel sounds are normal. There is no tenderness.   +ostomy in place   Musculoskeletal: Normal range of motion. She exhibits no edema or tenderness.   Neurological: She is alert and oriented to person, place, and time. She has normal reflexes. No cranial nerve deficit.   Skin: Skin is warm and dry.   Psychiatric: Her behavior is normal. She exhibits a depressed mood.   Nursing note and vitals reviewed.      Significant Labs: All pertinent labs within the past 24 hours have been reviewed.    Significant Imaging: I have reviewed all pertinent imaging results/findings within the past 24 hours.    Assessment/Plan:      * Abdominal pain    Differential includes active Crohn's disease, obstruction.  Pt afebrile and without leukocytosis.  CMP with no acute abnormalities. CRP WNL at 0.9. Lipase WNL at 36. Lactic acid WNL at 1.2. UA unremarkable. ESR 23 and blood culture x 2 with NGTD.  GI consulted: check c.diff (neg), stool culture (process), fecal lactoferrin (in process); UA fairly unremarkable (will repeat); would give patient a full liquid diet and advance as tolerated   Supportive care with pain medication (attempting to limit) and IVF.              Crohn's disease    Plan per GI was to start stelara.  Pt received loading dose on 7/11/2017.  GI consulted as above.  Dr. Ross to possibly see pt on Monday.          S/P ileostomy    Increased watery output per patient resulting in irritation around stoma site  Pt agreeable to  consult  Will continue to monitor  Strict I/Os           Essential hypertension, benign    BP controlled on admission  Will hold lisinopril at this time based on increased output  Will continue to monitor          Generalized anxiety disorder    Will continue xanax          Depression    Pt tearful daily  Restarted celexa however pt declined psych consult (last eval ~1 year ago)  Will continue to monitor.  No SI.            VTE Risk Mitigation         Ordered     Medium Risk of VTE  Once      08/04/17 8799              Anne-Marie Calle PA-C  Department of Hospital Medicine   [facility]

## 2017-08-05 NOTE — PLAN OF CARE
Problem: Patient Care Overview  Goal: Plan of Care Review  Outcome: Ongoing (interventions implemented as appropriate)  POC reviewed with the patient and voiced understanding. PRN pain meds given for RLQ abdomen pain with good result. Vss, no c/o arise. Safety precautions maintained. Family at bedside.

## 2017-08-05 NOTE — ASSESSMENT & PLAN NOTE
Plan per GI was to start stelara.  Pt received loading dose on 7/11/2017.  GI consulted as above.  Dr. Ross to possibly see pt on Monday.

## 2017-08-05 NOTE — SUBJECTIVE & OBJECTIVE
Interval History: no acute events overnight.  Pt states pain and output the same, no improvement.  Pt tearful this morning.  Pt declined psych consult.    Review of Systems   Constitutional: Positive for unexpected weight change. Negative for chills, diaphoresis and fever.   HENT: Negative for congestion, facial swelling, trouble swallowing and voice change.    Respiratory: Positive for shortness of breath. Negative for cough, chest tightness and wheezing.    Cardiovascular: Negative for chest pain, palpitations and leg swelling.   Gastrointestinal: Positive for abdominal pain, blood in stool, diarrhea and nausea.   Endocrine: Negative for cold intolerance, heat intolerance, polydipsia and polyuria.   Genitourinary: Positive for dysuria. Negative for flank pain, hematuria and pelvic pain.   Musculoskeletal: Positive for back pain. Negative for arthralgias, myalgias and neck pain.   Skin: Negative for color change, pallor, rash and wound.   Neurological: Positive for light-headedness and headaches. Negative for dizziness, tremors and weakness.   Hematological: Negative for adenopathy. Bruises/bleeds easily (not new).   Psychiatric/Behavioral: Negative for confusion, dysphoric mood and sleep disturbance. The patient is not nervous/anxious.      Objective:     Vital Signs (Most Recent):  Temp: 98.4 °F (36.9 °C) (08/05/17 1207)  Pulse: 71 (08/05/17 1207)  Resp: 16 (08/05/17 0816)  BP: (!) 143/80 (08/05/17 1207)  SpO2: 97 % (08/05/17 1207) Vital Signs (24h Range):  Temp:  [97.7 °F (36.5 °C)-99.3 °F (37.4 °C)] 98.4 °F (36.9 °C)  Pulse:  [71-84] 71  Resp:  [16-18] 16  SpO2:  [95 %-99 %] 97 %  BP: (117-159)/(65-98) 143/80     Weight: 42.8 kg (94 lb 5.7 oz)  Body mass index is 17.83 kg/m².    Intake/Output Summary (Last 24 hours) at 08/05/17 1337  Last data filed at 08/05/17 0914   Gross per 24 hour   Intake             1415 ml   Output             2150 ml   Net             -735 ml      Physical Exam   Constitutional: She is  oriented to person, place, and time. She appears well-developed and well-nourished.   HENT:   Head: Normocephalic and atraumatic.   Eyes: EOM are normal. Pupils are equal, round, and reactive to light.   Neck: Normal range of motion. Neck supple.   Cardiovascular: Normal rate and regular rhythm.    Pulmonary/Chest: Effort normal and breath sounds normal.   Abdominal: Soft. Bowel sounds are normal. There is no tenderness.   +ostomy in place   Musculoskeletal: Normal range of motion. She exhibits no edema or tenderness.   Neurological: She is alert and oriented to person, place, and time. She has normal reflexes. No cranial nerve deficit.   Skin: Skin is warm and dry.   Psychiatric: Her behavior is normal. She exhibits a depressed mood.   Nursing note and vitals reviewed.      Significant Labs: All pertinent labs within the past 24 hours have been reviewed.    Significant Imaging: I have reviewed all pertinent imaging results/findings within the past 24 hours.

## 2017-08-05 NOTE — PLAN OF CARE
Problem: Patient Care Overview  Goal: Plan of Care Review  Outcome: Ongoing (interventions implemented as appropriate)  Pt AAOx4. Very knowledgeable of her disease process. Being hospitalized for abdominal pain possibly relating to Chron's. Lab orders placed to rule out c-diff. Pt has RLQ ileostomy, cares for it independently. Wound care consult placed for excoriation around stoma. Currently NPO but no swallowing difficulties. Ambulatory independently. Pt has left subclavian port with NS infusing at 75cc/hr. VSS. Pts skin intact but has been noticing bruises sporadically all over body of unknown origin. No N/V at the moment but pt does c/o pain. Boyfriend at bedside. tm.       4

## 2017-08-05 NOTE — PROGRESS NOTES
Crying in pain,informed pt not due til 10pm,Dr Dash notified,order given for one time pain med,continue to monitor.

## 2017-08-05 NOTE — ASSESSMENT & PLAN NOTE
Differential includes active Crohn's disease, obstruction.  Pt afebrile and without leukocytosis.  CMP with no acute abnormalities. CRP WNL at 0.9. Lipase WNL at 36. Lactic acid WNL at 1.2. UA unremarkable. ESR 23 and blood culture x 2 with NGTD.  GI consulted: check c.diff (neg), stool culture (process), fecal lactoferrin (in process); UA fairly unremarkable (will repeat); would give patient a full liquid diet and advance as tolerated   Supportive care with pain medication (attempting to limit) and IVF.

## 2017-08-05 NOTE — PLAN OF CARE
Problem: Patient Care Overview  Goal: Plan of Care Review  Outcome: Ongoing (interventions implemented as appropriate)  POC reviewed w pt,extra dose pain med order given x1,hot packs provided,ambulates ad neftaly,vss,no injury.

## 2017-08-05 NOTE — ASSESSMENT & PLAN NOTE
Pt tearful daily  Restarted celexa however pt declined psych consult (last eval ~1 year ago)  Will continue to monitor.  No SI.

## 2017-08-06 LAB
ANION GAP SERPL CALC-SCNC: 7 MMOL/L
BASOPHILS # BLD AUTO: 0.01 K/UL
BASOPHILS NFR BLD: 0.1 %
BILIRUB UR QL STRIP: NEGATIVE
BUN SERPL-MCNC: 4 MG/DL
CALCIUM SERPL-MCNC: 8.3 MG/DL
CHLORIDE SERPL-SCNC: 109 MMOL/L
CLARITY UR REFRACT.AUTO: CLEAR
CO2 SERPL-SCNC: 24 MMOL/L
COLOR UR AUTO: NORMAL
CREAT SERPL-MCNC: 0.7 MG/DL
DIFFERENTIAL METHOD: ABNORMAL
EOSINOPHIL # BLD AUTO: 0.2 K/UL
EOSINOPHIL NFR BLD: 1.2 %
ERYTHROCYTE [DISTWIDTH] IN BLOOD BY AUTOMATED COUNT: 13.3 %
EST. GFR  (AFRICAN AMERICAN): >60 ML/MIN/1.73 M^2
EST. GFR  (NON AFRICAN AMERICAN): >60 ML/MIN/1.73 M^2
GLUCOSE SERPL-MCNC: 85 MG/DL
GLUCOSE UR QL STRIP: NEGATIVE
HCT VFR BLD AUTO: 38.7 %
HGB BLD-MCNC: 12.6 G/DL
HGB UR QL STRIP: NEGATIVE
KETONES UR QL STRIP: NEGATIVE
LEUKOCYTE ESTERASE UR QL STRIP: NEGATIVE
LYMPHOCYTES # BLD AUTO: 1.9 K/UL
LYMPHOCYTES NFR BLD: 15 %
MAGNESIUM SERPL-MCNC: 1.9 MG/DL
MCH RBC QN AUTO: 30.6 PG
MCHC RBC AUTO-ENTMCNC: 32.6 G/DL
MCV RBC AUTO: 94 FL
MONOCYTES # BLD AUTO: 1.2 K/UL
MONOCYTES NFR BLD: 9.2 %
NEUTROPHILS # BLD AUTO: 9.3 K/UL
NEUTROPHILS NFR BLD: 74 %
NITRITE UR QL STRIP: NEGATIVE
PH UR STRIP: 6 [PH] (ref 5–8)
PHOSPHATE SERPL-MCNC: 4 MG/DL
PLATELET # BLD AUTO: 250 K/UL
PMV BLD AUTO: 9.8 FL
POTASSIUM SERPL-SCNC: 3.9 MMOL/L
PROT UR QL STRIP: NEGATIVE
RBC # BLD AUTO: 4.12 M/UL
SODIUM SERPL-SCNC: 140 MMOL/L
SP GR UR STRIP: 1 (ref 1–1.03)
URN SPEC COLLECT METH UR: NORMAL
UROBILINOGEN UR STRIP-ACNC: NEGATIVE EU/DL
WBC # BLD AUTO: 12.56 K/UL

## 2017-08-06 PROCEDURE — 81003 URINALYSIS AUTO W/O SCOPE: CPT

## 2017-08-06 PROCEDURE — 83735 ASSAY OF MAGNESIUM: CPT

## 2017-08-06 PROCEDURE — 80048 BASIC METABOLIC PNL TOTAL CA: CPT

## 2017-08-06 PROCEDURE — 99226 PR SUBSEQUENT OBSERVATION CARE,LEVEL III: CPT | Mod: ,,, | Performed by: PHYSICIAN ASSISTANT

## 2017-08-06 PROCEDURE — 84100 ASSAY OF PHOSPHORUS: CPT

## 2017-08-06 PROCEDURE — 11000001 HC ACUTE MED/SURG PRIVATE ROOM

## 2017-08-06 PROCEDURE — G0378 HOSPITAL OBSERVATION PER HR: HCPCS

## 2017-08-06 PROCEDURE — 63600175 PHARM REV CODE 636 W HCPCS: Performed by: PHYSICIAN ASSISTANT

## 2017-08-06 PROCEDURE — 85025 COMPLETE CBC W/AUTO DIFF WBC: CPT

## 2017-08-06 PROCEDURE — 25000003 PHARM REV CODE 250: Performed by: PHYSICIAN ASSISTANT

## 2017-08-06 RX ORDER — ALPRAZOLAM 0.5 MG/1
0.5 TABLET ORAL 2 TIMES DAILY
Status: DISCONTINUED | OUTPATIENT
Start: 2017-08-06 | End: 2017-08-10 | Stop reason: HOSPADM

## 2017-08-06 RX ORDER — SODIUM CHLORIDE 9 MG/ML
INJECTION, SOLUTION INTRAVENOUS CONTINUOUS
Status: ACTIVE | OUTPATIENT
Start: 2017-08-06 | End: 2017-08-07

## 2017-08-06 RX ADMIN — HYDROCODONE BITARTRATE AND ACETAMINOPHEN 1 TABLET: 5; 325 TABLET ORAL at 07:08

## 2017-08-06 RX ADMIN — HYDROMORPHONE HYDROCHLORIDE 1 MG: 1 INJECTION, SOLUTION INTRAMUSCULAR; INTRAVENOUS; SUBCUTANEOUS at 08:08

## 2017-08-06 RX ADMIN — HYDROMORPHONE HYDROCHLORIDE 1 MG: 1 INJECTION, SOLUTION INTRAMUSCULAR; INTRAVENOUS; SUBCUTANEOUS at 04:08

## 2017-08-06 RX ADMIN — ZOLPIDEM TARTRATE 10 MG: 5 TABLET, FILM COATED ORAL at 09:08

## 2017-08-06 RX ADMIN — PROMETHAZINE HYDROCHLORIDE 12.5 MG: 12.5 TABLET ORAL at 04:08

## 2017-08-06 RX ADMIN — SODIUM CHLORIDE: 0.9 INJECTION, SOLUTION INTRAVENOUS at 01:08

## 2017-08-06 RX ADMIN — HYDROCODONE BITARTRATE AND ACETAMINOPHEN 1 TABLET: 5; 325 TABLET ORAL at 10:08

## 2017-08-06 RX ADMIN — PROMETHAZINE HYDROCHLORIDE 12.5 MG: 12.5 TABLET ORAL at 10:08

## 2017-08-06 RX ADMIN — ONDANSETRON 4 MG: 2 INJECTION INTRAMUSCULAR; INTRAVENOUS at 12:08

## 2017-08-06 RX ADMIN — ALPRAZOLAM 0.5 MG: 0.5 TABLET ORAL at 01:08

## 2017-08-06 RX ADMIN — ONDANSETRON 4 MG: 2 INJECTION INTRAMUSCULAR; INTRAVENOUS at 06:08

## 2017-08-06 RX ADMIN — ALPRAZOLAM 0.5 MG: 0.5 TABLET ORAL at 09:08

## 2017-08-06 RX ADMIN — CITALOPRAM HYDROBROMIDE 20 MG: 20 TABLET ORAL at 08:08

## 2017-08-06 RX ADMIN — HYDROMORPHONE HYDROCHLORIDE 1 MG: 1 INJECTION, SOLUTION INTRAMUSCULAR; INTRAVENOUS; SUBCUTANEOUS at 12:08

## 2017-08-06 RX ADMIN — DIPHENOXYLATE HYDROCHLORIDE AND ATROPINE SULFATE 1 TABLET: 2.5; .025 TABLET ORAL at 08:08

## 2017-08-06 RX ADMIN — HYDROCODONE BITARTRATE AND ACETAMINOPHEN 1 TABLET: 5; 325 TABLET ORAL at 06:08

## 2017-08-06 RX ADMIN — ONDANSETRON 4 MG: 2 INJECTION INTRAMUSCULAR; INTRAVENOUS at 08:08

## 2017-08-06 RX ADMIN — HYDROCODONE BITARTRATE AND ACETAMINOPHEN 1 TABLET: 5; 325 TABLET ORAL at 02:08

## 2017-08-06 RX ADMIN — ALPRAZOLAM 0.5 MG: 0.5 TABLET ORAL at 08:08

## 2017-08-06 NOTE — PROGRESS NOTES
[facility] Hospital Medicine  Progress Note    Patient Name: Ivy Salgado  MRN: [id number]  Patient Class: OP- Observation   Admission Date: 8/4/2017  Length of Stay: 0 days  Attending Physician: Ant Bourgeois MD  Primary Care Provider: Kalia Astorga MD    [facility] Medicine Team: [facility] F Anne-Marie Calle PA-C    Subjective:     Principal Problem:Abdominal pain    HPI:  36 y/o F with h/o Crohn's since she was 11yo s/p surgery 5 years ago for colectomy and rectum removed. She was doing well and in remission until April 2017. She follow with Dr. Ross and was considering starting Stelera.  Pt presented to the ED with fevers (last night 100.7°F),  SOB, nausea without vomiting, headache, lightheadedness, 7/10 abdominal pain that is not relieved with at-home Percocet.   Pt reports increased output in her ostomy that is very watery.  This is cause severe irritation of her stoma and the skin around her stoma. She has noticed some BRB in her ostomy bag but denies any melena.  She was treated for ESBL UTI with Macrobid, ciprofloxacin, then Augmentin (last one week ago).  She reports some continuing dysuria and lower back pain.  She is receiving IV fluids weekly for dehydration and received 2 L on 8/1/17.    In the ED, CBC with no leukocytosis with WBC 7.61. CMP with no acute abnormalities. CRP WNL at 0.9. Lipase WNL at 36. PT/INR 10.8/1.0. Lactic acid WNL at 1.2. UA unremarkable. ESR 23 and blood culture x 2 pending.  GI consulted.      Hospital Course:  Pt admitted to observation for fever, increased abdominal pain and output from ostomy.   CBC with no leukocytosis with WBC 7.61. CMP with no acute abnormalities. CRP WNL at 0.9. Lipase WNL at 36. PT/INR 10.8/1.0. Lactic acid WNL at 1.2. UA unremarkable. ESR 23 and blood culture x 2 NGTD.  GI consulted.  WC consulted.  GI recommended checking CDiff (negative), stool culture (prelim no growth), fecal lactoferrin (in process).  Restarted  celexa, pt declined psych consult.  Dr. Ross to assess on Monday.        Interval History: no acute events overnight; no new complaints.  Pt continues with high output and abdominal pain    Review of Systems   Constitutional: Positive for unexpected weight change. Negative for chills, diaphoresis and fever.   HENT: Negative for congestion, facial swelling, trouble swallowing and voice change.    Respiratory: Positive for shortness of breath. Negative for cough, chest tightness and wheezing.    Cardiovascular: Negative for chest pain, palpitations and leg swelling.   Gastrointestinal: Positive for abdominal pain, blood in stool, diarrhea and nausea.   Endocrine: Negative for cold intolerance, heat intolerance, polydipsia and polyuria.   Genitourinary: Positive for dysuria. Negative for flank pain, hematuria and pelvic pain.   Musculoskeletal: Positive for back pain. Negative for arthralgias, myalgias and neck pain.   Skin: Negative for color change, pallor, rash and wound.   Neurological: Negative for dizziness, tremors, weakness, light-headedness and headaches.   Hematological: Negative for adenopathy. Bruises/bleeds easily (not new).   Psychiatric/Behavioral: Negative for confusion, dysphoric mood and sleep disturbance. The patient is not nervous/anxious.      Objective:     Vital Signs (Most Recent):  Temp: 98.7 °F (37.1 °C) (08/06/17 1130)  Pulse: 77 (08/06/17 1130)  Resp: 16 (08/06/17 1130)  BP: 109/71 (08/06/17 1130)  SpO2: 98 % (08/06/17 1130) Vital Signs (24h Range):  Temp:  [97.8 °F (36.6 °C)-98.7 °F (37.1 °C)] 98.7 °F (37.1 °C)  Pulse:  [72-90] 77  Resp:  [16-18] 16  SpO2:  [94 %-99 %] 98 %  BP: (109-159)/(71-88) 109/71     Weight: 42.8 kg (94 lb 5.7 oz)  Body mass index is 17.83 kg/m².    Intake/Output Summary (Last 24 hours) at 08/06/17 1219  Last data filed at 08/06/17 0907   Gross per 24 hour   Intake              950 ml   Output             2600 ml   Net            -1650 ml      Physical Exam    Constitutional: She is oriented to person, place, and time. She appears well-developed and well-nourished.   HENT:   Head: Normocephalic and atraumatic.   Eyes: EOM are normal. Pupils are equal, round, and reactive to light.   Neck: Normal range of motion. Neck supple.   Cardiovascular: Normal rate and regular rhythm.    Pulmonary/Chest: Effort normal and breath sounds normal.   Abdominal: Soft. Bowel sounds are normal. There is tenderness (around stoma).   +ostomy in place   Musculoskeletal: Normal range of motion. She exhibits no edema or tenderness.   Neurological: She is alert and oriented to person, place, and time. She has normal reflexes. No cranial nerve deficit.   Skin: Skin is warm and dry.   Psychiatric: Her behavior is normal. She exhibits a depressed mood.   Nursing note and vitals reviewed.       Significant Labs: All pertinent labs within the past 24 hours have been reviewed.    Significant Imaging: I have reviewed all pertinent imaging results/findings within the past 24 hours.    Assessment/Plan:      * Abdominal pain    Differential includes active Crohn's disease, obstruction.  Pt afebrile and without leukocytosis.  CMP with no acute abnormalities. CRP WNL at 0.9. Lipase WNL at 36. Lactic acid WNL at 1.2. UA unremarkable. ESR 23 and blood culture x 2 with NGTD.  GI consulted: check c.diff (neg), stool culture (prelim no growth), fecal lactoferrin (in process); UA fairly unremarkable (will repeat); would give patient a full liquid diet and advance as tolerated   Supportive care with pain medication (attempting to limit) and IVF.              Crohn's disease    Plan per GI was to start stelara.  Pt received loading dose on 7/11/2017.  GI consulted as above.  Dr. Ross to possibly see pt on Monday.          S/P ileostomy    Increased watery output per patient resulting in irritation around stoma site  Pt agreeable to  consult  Will continue to monitor  Strict I/Os          Essential hypertension,  benign    BP controlled on admission  Will hold lisinopril at this time based on increased output  Will continue to monitor          Generalized anxiety disorder    Will continue xanax          Depression    Pt tearful daily  Restarted celexa however pt declined psych consult (last eval ~1 year ago)  Will continue to monitor.  No SI.            VTE Risk Mitigation         Ordered     Medium Risk of VTE  Once      08/04/17 3823              Anne-Marie Calle PA-C  Department of Hospital Medicine   [facility]

## 2017-08-06 NOTE — PLAN OF CARE
Problem: Patient Care Overview  Goal: Plan of Care Review  Outcome: Ongoing (interventions implemented as appropriate)  POC reviewed w pt,ambulates independently w no falls,afebrile,alternating IV w po pain and nausea meds,pt is independent w ileostomy.

## 2017-08-06 NOTE — ASSESSMENT & PLAN NOTE
Differential includes active Crohn's disease, obstruction.  Pt afebrile and without leukocytosis.  CMP with no acute abnormalities. CRP WNL at 0.9. Lipase WNL at 36. Lactic acid WNL at 1.2. UA unremarkable. ESR 23 and blood culture x 2 with NGTD.  GI consulted: check c.diff (neg), stool culture (prelim no growth), fecal lactoferrin (in process); UA fairly unremarkable (will repeat); would give patient a full liquid diet and advance as tolerated   Supportive care with pain medication (attempting to limit) and IVF.

## 2017-08-06 NOTE — SUBJECTIVE & OBJECTIVE
Interval History: no acute events overnight; no new complaints.  Pt continues with high output and abdominal pain    Review of Systems   Constitutional: Positive for unexpected weight change. Negative for chills, diaphoresis and fever.   HENT: Negative for congestion, facial swelling, trouble swallowing and voice change.    Respiratory: Positive for shortness of breath. Negative for cough, chest tightness and wheezing.    Cardiovascular: Negative for chest pain, palpitations and leg swelling.   Gastrointestinal: Positive for abdominal pain, blood in stool, diarrhea and nausea.   Endocrine: Negative for cold intolerance, heat intolerance, polydipsia and polyuria.   Genitourinary: Positive for dysuria. Negative for flank pain, hematuria and pelvic pain.   Musculoskeletal: Positive for back pain. Negative for arthralgias, myalgias and neck pain.   Skin: Negative for color change, pallor, rash and wound.   Neurological: Negative for dizziness, tremors, weakness, light-headedness and headaches.   Hematological: Negative for adenopathy. Bruises/bleeds easily (not new).   Psychiatric/Behavioral: Negative for confusion, dysphoric mood and sleep disturbance. The patient is not nervous/anxious.      Objective:     Vital Signs (Most Recent):  Temp: 98.7 °F (37.1 °C) (08/06/17 1130)  Pulse: 77 (08/06/17 1130)  Resp: 16 (08/06/17 1130)  BP: 109/71 (08/06/17 1130)  SpO2: 98 % (08/06/17 1130) Vital Signs (24h Range):  Temp:  [97.8 °F (36.6 °C)-98.7 °F (37.1 °C)] 98.7 °F (37.1 °C)  Pulse:  [72-90] 77  Resp:  [16-18] 16  SpO2:  [94 %-99 %] 98 %  BP: (109-159)/(71-88) 109/71     Weight: 42.8 kg (94 lb 5.7 oz)  Body mass index is 17.83 kg/m².    Intake/Output Summary (Last 24 hours) at 08/06/17 1219  Last data filed at 08/06/17 0907   Gross per 24 hour   Intake              950 ml   Output             2600 ml   Net            -1650 ml      Physical Exam   Constitutional: She is oriented to person, place, and time. She appears  well-developed and well-nourished.   HENT:   Head: Normocephalic and atraumatic.   Eyes: EOM are normal. Pupils are equal, round, and reactive to light.   Neck: Normal range of motion. Neck supple.   Cardiovascular: Normal rate and regular rhythm.    Pulmonary/Chest: Effort normal and breath sounds normal.   Abdominal: Soft. Bowel sounds are normal. There is tenderness (around stoma).   +ostomy in place   Musculoskeletal: Normal range of motion. She exhibits no edema or tenderness.   Neurological: She is alert and oriented to person, place, and time. She has normal reflexes. No cranial nerve deficit.   Skin: Skin is warm and dry.   Psychiatric: Her behavior is normal. She exhibits a depressed mood.   Nursing note and vitals reviewed.       Significant Labs: All pertinent labs within the past 24 hours have been reviewed.    Significant Imaging: I have reviewed all pertinent imaging results/findings within the past 24 hours.

## 2017-08-06 NOTE — PLAN OF CARE
Problem: Patient Care Overview  Goal: Plan of Care Review  Outcome: Ongoing (interventions implemented as appropriate)  POC discussed with the patient and verbalized understanding. Abdominal pain managed by alternating po and IV pain med. No acute distress noted, no complaints arise. Safety precautions maintained. Family at bedside.

## 2017-08-07 ENCOUNTER — ANESTHESIA EVENT (OUTPATIENT)
Dept: ENDOSCOPY | Facility: HOSPITAL | Age: 37
DRG: 392 | End: 2017-08-07
Payer: COMMERCIAL

## 2017-08-07 LAB
ANION GAP SERPL CALC-SCNC: 7 MMOL/L
BASOPHILS # BLD AUTO: 0.01 K/UL
BASOPHILS NFR BLD: 0.1 %
BILIRUB UR QL STRIP: NEGATIVE
BUN SERPL-MCNC: 3 MG/DL
CALCIUM SERPL-MCNC: 8.5 MG/DL
CHLORIDE SERPL-SCNC: 109 MMOL/L
CLARITY UR REFRACT.AUTO: CLEAR
CO2 SERPL-SCNC: 24 MMOL/L
COLOR UR AUTO: NORMAL
CREAT SERPL-MCNC: 0.7 MG/DL
DIFFERENTIAL METHOD: ABNORMAL
E COLI SXT1 STL QL IA: NEGATIVE
E COLI SXT2 STL QL IA: NEGATIVE
EOSINOPHIL # BLD AUTO: 0.1 K/UL
EOSINOPHIL NFR BLD: 1.1 %
ERYTHROCYTE [DISTWIDTH] IN BLOOD BY AUTOMATED COUNT: 13.3 %
EST. GFR  (AFRICAN AMERICAN): >60 ML/MIN/1.73 M^2
EST. GFR  (NON AFRICAN AMERICAN): >60 ML/MIN/1.73 M^2
GLUCOSE SERPL-MCNC: 106 MG/DL
GLUCOSE UR QL STRIP: NEGATIVE
HCT VFR BLD AUTO: 35.9 %
HGB BLD-MCNC: 11.6 G/DL
HGB UR QL STRIP: NEGATIVE
KETONES UR QL STRIP: NEGATIVE
LEUKOCYTE ESTERASE UR QL STRIP: NEGATIVE
LYMPHOCYTES # BLD AUTO: 1.7 K/UL
LYMPHOCYTES NFR BLD: 18.7 %
MAGNESIUM SERPL-MCNC: 1.8 MG/DL
MCH RBC QN AUTO: 30.5 PG
MCHC RBC AUTO-ENTMCNC: 32.3 G/DL
MCV RBC AUTO: 95 FL
MONOCYTES # BLD AUTO: 1 K/UL
MONOCYTES NFR BLD: 11.3 %
NEUTROPHILS # BLD AUTO: 6.2 K/UL
NEUTROPHILS NFR BLD: 68.5 %
NITRITE UR QL STRIP: NEGATIVE
PH UR STRIP: 6 [PH] (ref 5–8)
PHOSPHATE SERPL-MCNC: 3.1 MG/DL
PLATELET # BLD AUTO: 223 K/UL
PMV BLD AUTO: 9.5 FL
POTASSIUM SERPL-SCNC: 3.7 MMOL/L
PROT UR QL STRIP: NEGATIVE
RBC # BLD AUTO: 3.8 M/UL
SODIUM SERPL-SCNC: 140 MMOL/L
SP GR UR STRIP: 1 (ref 1–1.03)
URN SPEC COLLECT METH UR: NORMAL
UROBILINOGEN UR STRIP-ACNC: NEGATIVE EU/DL
WBC # BLD AUTO: 9.09 K/UL

## 2017-08-07 PROCEDURE — 25000003 PHARM REV CODE 250: Performed by: INTERNAL MEDICINE

## 2017-08-07 PROCEDURE — 63600175 PHARM REV CODE 636 W HCPCS: Performed by: PHYSICIAN ASSISTANT

## 2017-08-07 PROCEDURE — 25000003 PHARM REV CODE 250: Performed by: PHYSICIAN ASSISTANT

## 2017-08-07 PROCEDURE — 11000001 HC ACUTE MED/SURG PRIVATE ROOM

## 2017-08-07 PROCEDURE — 99226 PR SUBSEQUENT OBSERVATION CARE,LEVEL III: CPT | Mod: ,,, | Performed by: PHYSICIAN ASSISTANT

## 2017-08-07 PROCEDURE — G0378 HOSPITAL OBSERVATION PER HR: HCPCS

## 2017-08-07 PROCEDURE — 83735 ASSAY OF MAGNESIUM: CPT

## 2017-08-07 PROCEDURE — 81003 URINALYSIS AUTO W/O SCOPE: CPT

## 2017-08-07 PROCEDURE — 80048 BASIC METABOLIC PNL TOTAL CA: CPT

## 2017-08-07 PROCEDURE — 84100 ASSAY OF PHOSPHORUS: CPT

## 2017-08-07 PROCEDURE — 85025 COMPLETE CBC W/AUTO DIFF WBC: CPT

## 2017-08-07 RX ORDER — MICONAZOLE NITRATE 2 %
POWDER (GRAM) TOPICAL
Status: DISCONTINUED | OUTPATIENT
Start: 2017-08-07 | End: 2017-08-10 | Stop reason: HOSPADM

## 2017-08-07 RX ORDER — SYRING-NEEDL,DISP,INSUL,0.3 ML 29 G X1/2"
296 SYRINGE, EMPTY DISPOSABLE MISCELLANEOUS ONCE
Status: COMPLETED | OUTPATIENT
Start: 2017-08-07 | End: 2017-08-07

## 2017-08-07 RX ORDER — POLYETHYLENE GLYCOL 3350, SODIUM SULFATE ANHYDROUS, SODIUM BICARBONATE, SODIUM CHLORIDE, POTASSIUM CHLORIDE 236; 22.74; 6.74; 5.86; 2.97 G/4L; G/4L; G/4L; G/4L; G/4L
2000 POWDER, FOR SOLUTION ORAL ONCE
Status: DISCONTINUED | OUTPATIENT
Start: 2017-08-07 | End: 2017-08-07

## 2017-08-07 RX ORDER — SODIUM CHLORIDE 9 MG/ML
INJECTION, SOLUTION INTRAVENOUS CONTINUOUS
Status: ACTIVE | OUTPATIENT
Start: 2017-08-07 | End: 2017-08-08

## 2017-08-07 RX ADMIN — PROMETHAZINE HYDROCHLORIDE 12.5 MG: 12.5 TABLET ORAL at 06:08

## 2017-08-07 RX ADMIN — DIPHENOXYLATE HYDROCHLORIDE AND ATROPINE SULFATE 1 TABLET: 2.5; .025 TABLET ORAL at 12:08

## 2017-08-07 RX ADMIN — HYDROCODONE BITARTRATE AND ACETAMINOPHEN 1 TABLET: 5; 325 TABLET ORAL at 06:08

## 2017-08-07 RX ADMIN — HYDROMORPHONE HYDROCHLORIDE 1 MG: 1 INJECTION, SOLUTION INTRAMUSCULAR; INTRAVENOUS; SUBCUTANEOUS at 12:08

## 2017-08-07 RX ADMIN — CITROMA MAGNESIUM CITRATE 296 ML: 1.75 LIQUID ORAL at 05:08

## 2017-08-07 RX ADMIN — ONDANSETRON 4 MG: 2 INJECTION INTRAMUSCULAR; INTRAVENOUS at 04:08

## 2017-08-07 RX ADMIN — HYDROMORPHONE HYDROCHLORIDE 1 MG: 1 INJECTION, SOLUTION INTRAMUSCULAR; INTRAVENOUS; SUBCUTANEOUS at 04:08

## 2017-08-07 RX ADMIN — ALPRAZOLAM 0.5 MG: 0.5 TABLET ORAL at 10:08

## 2017-08-07 RX ADMIN — ONDANSETRON 4 MG: 2 INJECTION INTRAMUSCULAR; INTRAVENOUS at 10:08

## 2017-08-07 RX ADMIN — HYDROCODONE BITARTRATE AND ACETAMINOPHEN 1 TABLET: 5; 325 TABLET ORAL at 10:08

## 2017-08-07 RX ADMIN — ALPRAZOLAM 0.5 MG: 0.5 TABLET ORAL at 08:08

## 2017-08-07 RX ADMIN — HYDROMORPHONE HYDROCHLORIDE 1 MG: 1 INJECTION, SOLUTION INTRAMUSCULAR; INTRAVENOUS; SUBCUTANEOUS at 10:08

## 2017-08-07 RX ADMIN — HYDROMORPHONE HYDROCHLORIDE 1 MG: 1 INJECTION, SOLUTION INTRAMUSCULAR; INTRAVENOUS; SUBCUTANEOUS at 08:08

## 2017-08-07 RX ADMIN — SODIUM CHLORIDE 950 ML: 0.9 INJECTION, SOLUTION INTRAVENOUS at 02:08

## 2017-08-07 RX ADMIN — HYDROCODONE BITARTRATE AND ACETAMINOPHEN 1 TABLET: 5; 325 TABLET ORAL at 03:08

## 2017-08-07 RX ADMIN — PROMETHAZINE HYDROCHLORIDE 12.5 MG: 12.5 TABLET ORAL at 12:08

## 2017-08-07 RX ADMIN — HYDROCODONE BITARTRATE AND ACETAMINOPHEN 1 TABLET: 5; 325 TABLET ORAL at 08:08

## 2017-08-07 RX ADMIN — CITALOPRAM HYDROBROMIDE 20 MG: 20 TABLET ORAL at 08:08

## 2017-08-07 RX ADMIN — ZOLPIDEM TARTRATE 10 MG: 5 TABLET, FILM COATED ORAL at 10:08

## 2017-08-07 NOTE — SUBJECTIVE & OBJECTIVE
Interval History: no acute events overnight; no new complaints.  Pt still with high output.  Pt also c/o dysuria, reviewed second UA from yesterday (clean).  Discussed with GI plan for ileoscopy tomorrow    Review of Systems   Constitutional: Positive for unexpected weight change. Negative for chills, diaphoresis and fever.   HENT: Negative for congestion, facial swelling, trouble swallowing and voice change.    Respiratory: Negative for cough, chest tightness, shortness of breath and wheezing.    Cardiovascular: Negative for chest pain, palpitations and leg swelling.   Gastrointestinal: Positive for abdominal pain, diarrhea and nausea. Negative for blood in stool and vomiting.   Endocrine: Negative for cold intolerance, heat intolerance, polydipsia and polyuria.   Genitourinary: Positive for dysuria. Negative for flank pain, hematuria and pelvic pain.   Musculoskeletal: Positive for back pain. Negative for arthralgias, myalgias and neck pain.   Skin: Negative for color change, pallor, rash and wound.   Neurological: Negative for dizziness, tremors, weakness, light-headedness and headaches.   Hematological: Negative for adenopathy. Bruises/bleeds easily (not new).   Psychiatric/Behavioral: Negative for confusion, dysphoric mood and sleep disturbance. The patient is not nervous/anxious.      Objective:     Vital Signs (Most Recent):  Temp: 98.9 °F (37.2 °C) (08/07/17 1230)  Pulse: 88 (08/07/17 1230)  Resp: 18 (08/07/17 1230)  BP: 135/89 (08/07/17 1230)  SpO2: 98 % (08/07/17 1230) Vital Signs (24h Range):  Temp:  [98.1 °F (36.7 °C)-99.1 °F (37.3 °C)] 98.9 °F (37.2 °C)  Pulse:  [76-90] 88  Resp:  [15-18] 18  SpO2:  [96 %-100 %] 98 %  BP: (115-135)/(70-89) 135/89     Weight: 42.8 kg (94 lb 5.7 oz)  Body mass index is 17.83 kg/m².    Intake/Output Summary (Last 24 hours) at 08/07/17 1322  Last data filed at 08/07/17 0820   Gross per 24 hour   Intake             1005 ml   Output             5790 ml   Net            -4785  ml      Physical Exam   Constitutional: She is oriented to person, place, and time. She appears well-developed and well-nourished.   HENT:   Head: Normocephalic and atraumatic.   Eyes: EOM are normal. Pupils are equal, round, and reactive to light.   Neck: Normal range of motion. Neck supple.   Cardiovascular: Normal rate and regular rhythm.    Pulmonary/Chest: Effort normal and breath sounds normal.   Abdominal: Soft. Bowel sounds are normal. There is tenderness (around stoma).   +ostomy in place   Musculoskeletal: Normal range of motion. She exhibits no edema or tenderness.   Neurological: She is alert and oriented to person, place, and time. She has normal reflexes. No cranial nerve deficit.   Skin: Skin is warm and dry.   Psychiatric: Her behavior is normal. She exhibits a depressed mood.   Nursing note and vitals reviewed.      Significant Labs: All pertinent labs within the past 24 hours have been reviewed.    Significant Imaging: I have reviewed all pertinent imaging results/findings within the past 24 hours.

## 2017-08-07 NOTE — ANESTHESIA PREPROCEDURE EVALUATION
08/07/2017  Pre-operative evaluation for Procedure(s) (LRB):  ILEOSCOPY (N/A)    Ivy Salgado is a 37 y.o. female with a hx of HTN and longstanding hx of Crohn's disease s/p colectomy who presented to ED with fevers and abdominal pain. Scheduled for above procedure.     Previous airway documeted as Grade I with DL, easy mask    L SC PAC  Ileostomy      Patient Active Problem List   Diagnosis    Other and unspecified ovarian cyst    S/P ileostomy    Regional enteritis of small intestine with large intestine    Crohn's disease    Generalized anxiety disorder    C. difficile colitis    Herpes genitalis in women    Hypomagnesemia    Ovarian cyst    Frequent menstruation    Adnexal adhesions    Adhesions of uterus    Cyst of right ovary    Other unknown and unspecified cause of morbidity or mortality    Small bowel obstruction due to adhesions    Crohn's disease of large intestine with rectal bleeding    Joint pain    Joint swelling    Fatigue    Essential hypertension, benign    Abdominal pain    Nausea    Bruising    Crohn's disease of small intestine with complication    Depression       Review of patient's allergies indicates:   Allergen Reactions    Azathioprine sodium      Other reaction(s): pancreatitis  Other reaction(s): pancreatitis    Methotrexate      Other reaction(s): infection-    Morphine Itching and Other (See Comments)     Other reaction(s): Itching       No current facility-administered medications on file prior to encounter.      Current Outpatient Prescriptions on File Prior to Encounter   Medication Sig Dispense Refill    alprazolam (XANAX) 0.5 MG tablet TAKE 1 TABLET (0.5 MG TOTAL) BY MOUTH 2 (TWO) TIMES DAILY. 60 tablet 0    amoxicillin-clavulanate 875-125mg (AUGMENTIN) 875-125 mg per tablet Take 1 tablet by mouth every 12 (twelve) hours. 20 tablet 0     certolizumab pegol (CIMZIA) 400 mg/2 mL (200 mg/mL x 2) SyKt kit Inject 400 mg (2 mLs total) into the skin every 28 days. 5 each 3    diphenoxylate-atropine 2.5-0.025 mg (LOMOTIL) 2.5-0.025 mg per tablet Take 1 tablet by mouth 3 (three) times daily as needed for Diarrhea. 90 tablet 3    fluconazole (DIFLUCAN) 100 MG tablet Take 1 tablet (100 mg total) by mouth once daily. 7 tablet 0    lisinopril 10 MG tablet TAKE 1 TABLET (10 MG TOTAL) BY MOUTH ONCE DAILY. 90 tablet 2    norethindrone-ethinyl estradiol (GILDESS FE 1/20, 28,) 1 mg-20 mcg (21)/75 mg (7) per tablet Take 1 tablet by mouth once daily. Take one active pill daily continuously. 28 tablet 11    ondansetron (ZOFRAN-ODT) 8 MG TbDL Take 1 tablet (8 mg total) by mouth every 8 (eight) hours as needed (nausea). 30 tablet 0    oxycodone-acetaminophen (PERCOCET)  mg per tablet Take 1 tablet by mouth every 6 (six) hours as needed for Pain. 30 tablet 0    promethazine (PHENERGAN) 25 MG tablet TAKE 1 TABLET BY MOUTH EVERY 6 HOURS AS NEEDED FOR NAUSEA 30 tablet 3    sumatriptan (IMITREX) 100 MG tablet Take 1 tablet (100 mg total) by mouth every 2 (two) hours as needed for Migraine (do not exceed 2 doses in 24 hours). 9 tablet 1    valacyclovir (VALTREX) 500 MG tablet Take 1 tablet (500 mg total) by mouth once daily. 30 tablet 3    zolpidem (AMBIEN) 10 mg Tab TAKE 1 TABLET BY MOUTH AT BEDTIME 30 tablet 0       Past Surgical History:   Procedure Laterality Date    ABDOMINAL SURGERY      APPENDECTOMY      BLADDER SURGERY      partial cystectomy due to fistula    CKC      COLON SURGERY      COLONOSCOPY      HYSTERECTOMY  04/16/2015    SHELLIE    ILEOSTOMY      OOPHORECTOMY Right 04/16/2015    PORTACATH PLACEMENT      SKIN BIOPSY      SMALL INTESTINE SURGERY      TOTAL COLECTOMY      TUBAL LIGATION  06 06 2012    UPPER GASTROINTESTINAL ENDOSCOPY         Social History     Social History    Marital status: Single     Spouse name: N/A    Number of  children: N/A    Years of education: N/A     Occupational History     [employer]     Social History Main Topics    Smoking status: Never Smoker    Smokeless tobacco: Never Used    Alcohol use 0.0 oz/week      Comment: Patient only drinks wine not regular basis.    Drug use: No    Sexual activity: Yes     Partners: Male     Birth control/ protection: Pill, Surgical, Condom      Comment: HYST     Other Topics Concern    Not on file     Social History Narrative    No narrative on file         Vital Signs Range (Last 24H):  Temp:  [36.7 °C (98.1 °F)-37.2 °C (98.9 °F)]   Pulse:  [76-88]   Resp:  [15-18]   BP: (115-135)/(70-89)   SpO2:  [96 %-100 %]       CBC:   Recent Labs      08/06/17   0400  08/07/17   0400   WBC  12.56  9.09   RBC  4.12  3.80*   HGB  12.6  11.6*   HCT  38.7  35.9*   PLT  250  223   MCV  94  95   MCH  30.6  30.5   MCHC  32.6  32.3       CMP:   Recent Labs      08/06/17   0400  08/07/17   0400   NA  140  140   K  3.9  3.7   CL  109  109   CO2  24  24   BUN  4*  3*   CREATININE  0.7  0.7   GLU  85  106   MG  1.9  1.8   PHOS  4.0  3.1   CALCIUM  8.3*  8.5*       Anesthesia Evaluation    I have reviewed the Patient Summary Reports.     I have reviewed the Medications.     Review of Systems  Anesthesia Hx:  History of prior surgery of interest to airway management or planning: Previous anesthesia: General, MAC Airway issues documented on chart review include mask, easy, GETA, easy direct laryngoscopy  Denies Family Hx of Anesthesia complications.   Denies Personal Hx of Anesthesia complications.   Hematology/Oncology:     Oncology Normal    -- Anemia:   EENT/Dental:EENT/Dental Normal   Cardiovascular:   Hypertension    Pulmonary:  Pulmonary Normal    Renal/:   Denies Chronic Renal Disease.     Hepatic/GI:   Bowel Prep. Crohn's disease   Neurological:  Neurology Normal    Endocrine:  Endocrine Normal    Psych:   Psychiatric History anxiety depression          Physical  Exam  General:  Well nourished    Airway/Jaw/Neck:  Airway Findings: Mouth Opening: Normal Tongue: Normal  General Airway Assessment: Adult  Mallampati: II  TM Distance: Normal, at least 6 cm      Dental:  Dental Findings: In tact   Chest/Lungs:  Chest/Lungs Findings: Normal Respiratory Rate     Heart/Vascular:  Heart Findings: Rate: Normal  Rhythm: Regular Rhythm        Mental Status:  Mental Status Findings:  Cooperative, Alert and Oriented         Anesthesia Plan  Type of Anesthesia, risks & benefits discussed:  Anesthesia Type:  general, MAC  Patient's Preference:   Intra-op Monitoring Plan: standard ASA monitors  Intra-op Monitoring Plan Comments:   Post Op Pain Control Plan:   Post Op Pain Control Plan Comments:   Induction:   IV  Beta Blocker:  Patient is not currently on a Beta-Blocker (No further documentation required).       Informed Consent: Patient understands risks and agrees with Anesthesia plan.  Questions answered. Anesthesia consent signed with patient.  ASA Score: 2     Day of Surgery Review of History & Physical:    H&P update referred to the surgeon.         Ready For Surgery From Anesthesia Perspective.

## 2017-08-07 NOTE — ASSESSMENT & PLAN NOTE
Plan per GI was to start stelara.  Pt received loading dose on 7/11/2017.  GI consulted as above.  Dr. Ross recommendations as above.

## 2017-08-07 NOTE — PLAN OF CARE
Problem: Patient Care Overview  Goal: Plan of Care Review  Outcome: Ongoing (interventions implemented as appropriate)  POC reviewed with pt and family.  Pt verbalized understanding.  Questions and concerns addressed.  Patient continues to receive analgesics and antiemetics around the clock,  will receive bowel prep this evening for possible colonoscopy in am.  Pt progressing toward goals, will continue to monitor.  See flowsheets for full assessment and VS info.

## 2017-08-07 NOTE — PLAN OF CARE
PCP- DR. HANNAH HALL     PT HAS A RIDE HOME AND FAMILY SUPPORT PARENTS WHO LIVE NEARBY\       08/07/17 1635   Discharge Assessment   Assessment Type Discharge Planning Assessment   Confirmed/corrected address and phone number on facesheet? Yes   Assessment information obtained from? Patient   Expected Length of Stay (days) 2   Communicated expected length of stay with patient/caregiver yes   Prior to hospitilization cognitive status: Alert/Oriented   Prior to hospitalization functional status: Independent   Current cognitive status: Alert/Oriented   Current Functional Status: Independent   Arrived From home or self-care   Lives With child(katalina), dependent   Able to Return to Prior Arrangements yes   Is patient able to care for self after discharge? Yes   How many people do you have in your home that can help with your care after discharge? 0   Patient's perception of discharge disposition home or selfcare   Readmission Within The Last 30 Days no previous admission in last 30 days   Patient currently being followed by outpatient case management? No   Patient currently receives home health services? No   Does the patient currently use HME? Yes   Patient currently receives private duty nursing? No   Patient currently receives any other outside agency services? No   Equipment Currently Used at Home colostomy/ostomy supplies   Do you have any problems affording any of your prescribed medications? No   Is the patient taking medications as prescribed? yes   Do you have any financial concerns preventing you from receiving the healthcare you need? No   Does the patient have transportation to healthcare appointments? Yes   Transportation Available car;family or friend will provide   On Dialysis? No   Does the patient receive services at the Coumadin Clinic? No   Are there any open cases? No   Discharge Plan A Home   Discharge Plan B Home with family;Home Health   Patient/Family In Agreement With Plan yes

## 2017-08-07 NOTE — PROGRESS NOTES
[facility] Hospital Medicine  Progress Note    Patient Name: Ivy Salgado  MRN: [id number]  Patient Class: OP- Observation   Admission Date: 8/4/2017  Length of Stay: 0 days  Attending Physician: Ant Bourgeois MD  Primary Care Provider: Kalia Astorga MD    [facility] Medicine Team: [facility] MED F Anne-Marie Calle PA-C    Subjective:     Principal Problem:Abdominal pain    HPI:  38 y/o F with h/o Crohn's since she was 9yo s/p surgery 5 years ago for colectomy and rectum removed. She was doing well and in remission until April 2017. She follow with Dr. Ross and was considering starting Stelera.  Pt presented to the ED with fevers (last night 100.7°F),  SOB, nausea without vomiting, headache, lightheadedness, 7/10 abdominal pain that is not relieved with at-home Percocet.   Pt reports increased output in her ostomy that is very watery.  This is cause severe irritation of her stoma and the skin around her stoma. She has noticed some BRB in her ostomy bag but denies any melena.  She was treated for ESBL UTI with Macrobid, ciprofloxacin, then Augmentin (last one week ago).  She reports some continuing dysuria and lower back pain.  She is receiving IV fluids weekly for dehydration and received 2 L on 8/1/17.    In the ED, CBC with no leukocytosis with WBC 7.61. CMP with no acute abnormalities. CRP WNL at 0.9. Lipase WNL at 36. PT/INR 10.8/1.0. Lactic acid WNL at 1.2. UA unremarkable. ESR 23 and blood culture x 2 pending.  GI consulted.      Hospital Course:  Pt admitted to observation for fever, increased abdominal pain and output from ostomy.   CBC with no leukocytosis with WBC 7.61. CMP with no acute abnormalities. CRP WNL at 0.9. Lipase WNL at 36. PT/INR 10.8/1.0. Lactic acid WNL at 1.2. UA unremarkable. ESR 23 and blood culture x 2 NGTD.  GI consulted.  WC consulted.  GI recommended checking CDiff (negative), stool culture (prelim no growth), fecal lactoferrin (in process).  Restarted  celexa, pt declined psych consult.  Dr. Ross to assess on Monday.  8/7 pt to start 1/2 prep and NPO after midnight for ileoscopy on 8/8.  Pt c/o dysuria and third UA ordered although second UA clear.        Interval History: no acute events overnight; no new complaints.  Pt still with high output.  Pt also c/o dysuria, reviewed second UA from yesterday (clean).  Discussed with GI plan for ileoscopy tomorrow    Review of Systems   Constitutional: Positive for unexpected weight change. Negative for chills, diaphoresis and fever.   HENT: Negative for congestion, facial swelling, trouble swallowing and voice change.    Respiratory: Negative for cough, chest tightness, shortness of breath and wheezing.    Cardiovascular: Negative for chest pain, palpitations and leg swelling.   Gastrointestinal: Positive for abdominal pain, diarrhea and nausea. Negative for blood in stool and vomiting.   Endocrine: Negative for cold intolerance, heat intolerance, polydipsia and polyuria.   Genitourinary: Positive for dysuria. Negative for flank pain, hematuria and pelvic pain.   Musculoskeletal: Positive for back pain. Negative for arthralgias, myalgias and neck pain.   Skin: Negative for color change, pallor, rash and wound.   Neurological: Negative for dizziness, tremors, weakness, light-headedness and headaches.   Hematological: Negative for adenopathy. Bruises/bleeds easily (not new).   Psychiatric/Behavioral: Negative for confusion, dysphoric mood and sleep disturbance. The patient is not nervous/anxious.      Objective:     Vital Signs (Most Recent):  Temp: 98.9 °F (37.2 °C) (08/07/17 1230)  Pulse: 88 (08/07/17 1230)  Resp: 18 (08/07/17 1230)  BP: 135/89 (08/07/17 1230)  SpO2: 98 % (08/07/17 1230) Vital Signs (24h Range):  Temp:  [98.1 °F (36.7 °C)-99.1 °F (37.3 °C)] 98.9 °F (37.2 °C)  Pulse:  [76-90] 88  Resp:  [15-18] 18  SpO2:  [96 %-100 %] 98 %  BP: (115-135)/(70-89) 135/89     Weight: 42.8 kg (94 lb 5.7 oz)  Body mass index  is 17.83 kg/m².    Intake/Output Summary (Last 24 hours) at 08/07/17 1322  Last data filed at 08/07/17 0820   Gross per 24 hour   Intake             1005 ml   Output             5790 ml   Net            -4785 ml      Physical Exam   Constitutional: She is oriented to person, place, and time. She appears well-developed and well-nourished.   HENT:   Head: Normocephalic and atraumatic.   Eyes: EOM are normal. Pupils are equal, round, and reactive to light.   Neck: Normal range of motion. Neck supple.   Cardiovascular: Normal rate and regular rhythm.    Pulmonary/Chest: Effort normal and breath sounds normal.   Abdominal: Soft. Bowel sounds are normal. There is tenderness (around stoma).   +ostomy in place   Musculoskeletal: Normal range of motion. She exhibits no edema or tenderness.   Neurological: She is alert and oriented to person, place, and time. She has normal reflexes. No cranial nerve deficit.   Skin: Skin is warm and dry.   Psychiatric: Her behavior is normal. She exhibits a depressed mood.   Nursing note and vitals reviewed.      Significant Labs: All pertinent labs within the past 24 hours have been reviewed.    Significant Imaging: I have reviewed all pertinent imaging results/findings within the past 24 hours.    Assessment/Plan:      * Abdominal pain    Differential includes active Crohn's disease, obstruction.  Pt afebrile and without leukocytosis.  CMP with no acute abnormalities. CRP WNL at 0.9. Lipase WNL at 36. Lactic acid WNL at 1.2. UA unremarkable. ESR 23 and blood culture x 2 with NGTD.  GI consulted: check c.diff (neg), stool culture (prelim no growth), fecal lactoferrin (in process); UA fairly unremarkable (will repeat); would give patient a full liquid diet and advance as tolerated   Supportive care with pain medication (attempting to limit) and IVF.  8/7 Dr. Ross would like to proceed with ileoscopy on 8/8.  GI will place 1/2 prep orders.  NPO after midnight.            Crohn's disease     Plan per GI was to start stelara.  Pt received loading dose on 7/11/2017.  GI consulted as above.  Dr. Ross recommendations as above.          S/P ileostomy    Increased watery output per patient resulting in irritation around stoma site  Pt agreeable to WC consult  Will continue to monitor  Strict I/Os          Essential hypertension, benign    BP controlled on admission  Will hold lisinopril at this time based on increased output  Will continue to monitor          Generalized anxiety disorder    Will continue xanax          Depression    Pt tearful first few days, improvement noted 8/8  Restarted celexa however pt declined psych consult (last eval ~1 year ago)  Will continue to monitor.  No SI/HI/hallucinations.            VTE Risk Mitigation         Ordered     Medium Risk of VTE  Once      08/04/17 8441              Anne-Marie Calle PA-C  Department of Hospital Medicine   [facility]

## 2017-08-07 NOTE — CONSULTS
Consult received on patient's peristomal irritation. Patient preferred to leave pouch in place, due to not leaking. Patient showed recent photograph, denuded skin noted with areas resembling yeast. Recommend if tissue still irritated for next pouch change, patient to apply miconazole powder perform crusting technique and seal with cavilon no sting barrier film. Patient verbalized understanding. Miconazole powder and cavilon no sting barrier film ordered. Nursing to continue care.

## 2017-08-07 NOTE — PLAN OF CARE
Problem: Patient Care Overview  Goal: Plan of Care Review  Outcome: Ongoing (interventions implemented as appropriate)  POC review done states understanding,VSS,alternating IV w PO pain and nausea meds for abd pain,self care w ileo,pt received IVF l5ulpmx,lomotil po for increased ileo output with no relief,continues with copious amts of output.

## 2017-08-07 NOTE — ASSESSMENT & PLAN NOTE
Pt tearful first few days, improvement noted 8/8  Restarted celexa however pt declined psych consult (last eval ~1 year ago)  Will continue to monitor.  No SI/HI/hallucinations.

## 2017-08-08 ENCOUNTER — ANESTHESIA (OUTPATIENT)
Dept: ENDOSCOPY | Facility: HOSPITAL | Age: 37
DRG: 392 | End: 2017-08-08
Payer: COMMERCIAL

## 2017-08-08 ENCOUNTER — SURGERY (OUTPATIENT)
Age: 37
End: 2017-08-08

## 2017-08-08 LAB
ANION GAP SERPL CALC-SCNC: 4 MMOL/L
BACTERIA STL CULT: NORMAL
BUN SERPL-MCNC: 3 MG/DL
CALCIUM SERPL-MCNC: 8.2 MG/DL
CHLORIDE SERPL-SCNC: 106 MMOL/L
CO2 SERPL-SCNC: 29 MMOL/L
CREAT SERPL-MCNC: 0.7 MG/DL
EST. GFR  (AFRICAN AMERICAN): >60 ML/MIN/1.73 M^2
EST. GFR  (NON AFRICAN AMERICAN): >60 ML/MIN/1.73 M^2
GLUCOSE SERPL-MCNC: 88 MG/DL
LACTOFERRIN, STOOL: NEGATIVE
MAGNESIUM SERPL-MCNC: 1.8 MG/DL
POTASSIUM SERPL-SCNC: 3.6 MMOL/L
SODIUM SERPL-SCNC: 139 MMOL/L

## 2017-08-08 PROCEDURE — 37000009 HC ANESTHESIA EA ADD 15 MINS: Performed by: INTERNAL MEDICINE

## 2017-08-08 PROCEDURE — 83735 ASSAY OF MAGNESIUM: CPT

## 2017-08-08 PROCEDURE — 25000003 PHARM REV CODE 250: Performed by: NURSE ANESTHETIST, CERTIFIED REGISTERED

## 2017-08-08 PROCEDURE — 11000001 HC ACUTE MED/SURG PRIVATE ROOM

## 2017-08-08 PROCEDURE — D9220A PRA ANESTHESIA: Mod: ANES,,, | Performed by: ANESTHESIOLOGY

## 2017-08-08 PROCEDURE — 44382 SMALL BOWEL ENDOSCOPY: CPT | Performed by: INTERNAL MEDICINE

## 2017-08-08 PROCEDURE — D9220A PRA ANESTHESIA: Mod: CRNA,,, | Performed by: NURSE ANESTHETIST, CERTIFIED REGISTERED

## 2017-08-08 PROCEDURE — 63600175 PHARM REV CODE 636 W HCPCS: Performed by: ANESTHESIOLOGY

## 2017-08-08 PROCEDURE — 37000008 HC ANESTHESIA 1ST 15 MINUTES: Performed by: INTERNAL MEDICINE

## 2017-08-08 PROCEDURE — 63600175 PHARM REV CODE 636 W HCPCS: Performed by: PHYSICIAN ASSISTANT

## 2017-08-08 PROCEDURE — 63600175 PHARM REV CODE 636 W HCPCS: Performed by: NURSE ANESTHETIST, CERTIFIED REGISTERED

## 2017-08-08 PROCEDURE — 99222 1ST HOSP IP/OBS MODERATE 55: CPT | Mod: ,,, | Performed by: PHYSICIAN ASSISTANT

## 2017-08-08 PROCEDURE — 25000003 PHARM REV CODE 250: Performed by: PHYSICIAN ASSISTANT

## 2017-08-08 PROCEDURE — 80048 BASIC METABOLIC PNL TOTAL CA: CPT

## 2017-08-08 PROCEDURE — 0DBB8ZX EXCISION OF ILEUM, VIA NATURAL OR ARTIFICIAL OPENING ENDOSCOPIC, DIAGNOSTIC: ICD-10-PCS | Performed by: INTERNAL MEDICINE

## 2017-08-08 PROCEDURE — 88305 TISSUE EXAM BY PATHOLOGIST: CPT | Performed by: PATHOLOGY

## 2017-08-08 PROCEDURE — 27201012 HC FORCEPS, HOT/COLD, DISP: Performed by: INTERNAL MEDICINE

## 2017-08-08 RX ORDER — HYDROMORPHONE HYDROCHLORIDE 1 MG/ML
0.5 INJECTION, SOLUTION INTRAMUSCULAR; INTRAVENOUS; SUBCUTANEOUS EVERY 10 MIN PRN
Status: COMPLETED | OUTPATIENT
Start: 2017-08-08 | End: 2017-08-08

## 2017-08-08 RX ORDER — LIDOCAINE HCL/PF 100 MG/5ML
SYRINGE (ML) INTRAVENOUS
Status: DISCONTINUED | OUTPATIENT
Start: 2017-08-08 | End: 2017-08-08

## 2017-08-08 RX ORDER — PROPOFOL 10 MG/ML
VIAL (ML) INTRAVENOUS CONTINUOUS PRN
Status: DISCONTINUED | OUTPATIENT
Start: 2017-08-08 | End: 2017-08-08

## 2017-08-08 RX ORDER — HYDROMORPHONE HYDROCHLORIDE 1 MG/ML
1 INJECTION, SOLUTION INTRAMUSCULAR; INTRAVENOUS; SUBCUTANEOUS EVERY 5 MIN PRN
Status: DISCONTINUED | OUTPATIENT
Start: 2017-08-08 | End: 2017-08-08

## 2017-08-08 RX ORDER — SODIUM CHLORIDE 9 MG/ML
INJECTION, SOLUTION INTRAVENOUS CONTINUOUS PRN
Status: DISCONTINUED | OUTPATIENT
Start: 2017-08-08 | End: 2017-08-08

## 2017-08-08 RX ORDER — MIDAZOLAM HYDROCHLORIDE 1 MG/ML
INJECTION, SOLUTION INTRAMUSCULAR; INTRAVENOUS
Status: DISCONTINUED | OUTPATIENT
Start: 2017-08-08 | End: 2017-08-08

## 2017-08-08 RX ORDER — FENTANYL CITRATE 50 UG/ML
INJECTION, SOLUTION INTRAMUSCULAR; INTRAVENOUS
Status: DISCONTINUED | OUTPATIENT
Start: 2017-08-08 | End: 2017-08-08

## 2017-08-08 RX ORDER — SODIUM CHLORIDE 9 MG/ML
INJECTION, SOLUTION INTRAVENOUS CONTINUOUS
Status: ACTIVE | OUTPATIENT
Start: 2017-08-08 | End: 2017-08-09

## 2017-08-08 RX ORDER — DICYCLOMINE HYDROCHLORIDE 20 MG/1
20 TABLET ORAL
Status: DISCONTINUED | OUTPATIENT
Start: 2017-08-08 | End: 2017-08-10 | Stop reason: HOSPADM

## 2017-08-08 RX ORDER — PROPOFOL 10 MG/ML
VIAL (ML) INTRAVENOUS
Status: DISCONTINUED | OUTPATIENT
Start: 2017-08-08 | End: 2017-08-08

## 2017-08-08 RX ORDER — HYDROMORPHONE HYDROCHLORIDE 1 MG/ML
0.5 INJECTION, SOLUTION INTRAMUSCULAR; INTRAVENOUS; SUBCUTANEOUS EVERY 6 HOURS PRN
Status: DISCONTINUED | OUTPATIENT
Start: 2017-08-08 | End: 2017-08-09

## 2017-08-08 RX ORDER — OXYCODONE AND ACETAMINOPHEN 10; 325 MG/1; MG/1
1 TABLET ORAL EVERY 4 HOURS PRN
Status: DISCONTINUED | OUTPATIENT
Start: 2017-08-08 | End: 2017-08-10 | Stop reason: HOSPADM

## 2017-08-08 RX ADMIN — HYDROMORPHONE HYDROCHLORIDE 1 MG: 1 INJECTION, SOLUTION INTRAMUSCULAR; INTRAVENOUS; SUBCUTANEOUS at 08:08

## 2017-08-08 RX ADMIN — FENTANYL CITRATE 25 MCG: 50 INJECTION, SOLUTION INTRAMUSCULAR; INTRAVENOUS at 09:08

## 2017-08-08 RX ADMIN — HYDROCODONE BITARTRATE AND ACETAMINOPHEN 1 TABLET: 5; 325 TABLET ORAL at 02:08

## 2017-08-08 RX ADMIN — HYDROMORPHONE HYDROCHLORIDE 0.5 MG: 1 INJECTION, SOLUTION INTRAMUSCULAR; INTRAVENOUS; SUBCUTANEOUS at 11:08

## 2017-08-08 RX ADMIN — OXYCODONE HYDROCHLORIDE AND ACETAMINOPHEN 1 TABLET: 10; 325 TABLET ORAL at 07:08

## 2017-08-08 RX ADMIN — MIDAZOLAM HYDROCHLORIDE 1 MG: 1 INJECTION, SOLUTION INTRAMUSCULAR; INTRAVENOUS at 09:08

## 2017-08-08 RX ADMIN — DICYCLOMINE HYDROCHLORIDE 20 MG: 20 TABLET ORAL at 05:08

## 2017-08-08 RX ADMIN — ONDANSETRON 4 MG: 2 INJECTION INTRAMUSCULAR; INTRAVENOUS at 05:08

## 2017-08-08 RX ADMIN — ALPRAZOLAM 0.5 MG: 0.5 TABLET ORAL at 09:08

## 2017-08-08 RX ADMIN — HYDROMORPHONE HYDROCHLORIDE 1 MG: 1 INJECTION, SOLUTION INTRAMUSCULAR; INTRAVENOUS; SUBCUTANEOUS at 04:08

## 2017-08-08 RX ADMIN — HYDROCODONE BITARTRATE AND ACETAMINOPHEN 1 TABLET: 5; 325 TABLET ORAL at 01:08

## 2017-08-08 RX ADMIN — PROPOFOL 60 MG: 10 INJECTION, EMULSION INTRAVENOUS at 09:08

## 2017-08-08 RX ADMIN — PROPOFOL 150 MCG/KG/MIN: 10 INJECTION, EMULSION INTRAVENOUS at 09:08

## 2017-08-08 RX ADMIN — DICYCLOMINE HYDROCHLORIDE 20 MG: 20 TABLET ORAL at 09:08

## 2017-08-08 RX ADMIN — HYDROMORPHONE HYDROCHLORIDE 0.5 MG: 1 INJECTION, SOLUTION INTRAMUSCULAR; INTRAVENOUS; SUBCUTANEOUS at 12:08

## 2017-08-08 RX ADMIN — HYDROCODONE BITARTRATE AND ACETAMINOPHEN 1 TABLET: 5; 325 TABLET ORAL at 06:08

## 2017-08-08 RX ADMIN — HYDROMORPHONE HYDROCHLORIDE 0.5 MG: 1 INJECTION, SOLUTION INTRAMUSCULAR; INTRAVENOUS; SUBCUTANEOUS at 05:08

## 2017-08-08 RX ADMIN — SODIUM CHLORIDE: 0.9 INJECTION, SOLUTION INTRAVENOUS at 09:08

## 2017-08-08 RX ADMIN — ZOLPIDEM TARTRATE 10 MG: 5 TABLET, FILM COATED ORAL at 09:08

## 2017-08-08 RX ADMIN — ONDANSETRON 4 MG: 2 INJECTION INTRAMUSCULAR; INTRAVENOUS at 11:08

## 2017-08-08 RX ADMIN — CITALOPRAM HYDROBROMIDE 20 MG: 20 TABLET ORAL at 08:08

## 2017-08-08 RX ADMIN — ALPRAZOLAM 0.5 MG: 0.5 TABLET ORAL at 08:08

## 2017-08-08 RX ADMIN — SODIUM CHLORIDE: 0.9 INJECTION, SOLUTION INTRAVENOUS at 02:08

## 2017-08-08 RX ADMIN — MIDAZOLAM HYDROCHLORIDE 2 MG: 1 INJECTION, SOLUTION INTRAMUSCULAR; INTRAVENOUS at 09:08

## 2017-08-08 RX ADMIN — ONDANSETRON 4 MG: 2 INJECTION INTRAMUSCULAR; INTRAVENOUS at 08:08

## 2017-08-08 RX ADMIN — LIDOCAINE HYDROCHLORIDE 50 MG: 20 INJECTION, SOLUTION INTRAVENOUS at 09:08

## 2017-08-08 NOTE — ASSESSMENT & PLAN NOTE
- BP controlled on admission  - Will hold lisinopril at this time based on increased output  8/8: BP controlled. Will continue to monitor

## 2017-08-08 NOTE — PLAN OF CARE
Problem: Pain, Chronic (Adult)  Goal: Acceptable Pain Control/Comfort Level  Patient will demonstrate the desired outcomes by discharge/transition of care.   Outcome: Ongoing (interventions implemented as appropriate)  Pain medication decreased  patient voices mild relief of 5 /10 on numerical scale 0/10.

## 2017-08-08 NOTE — NURSING TRANSFER
Nursing Transfer Note      8/8/2017     Transfer To: Hospital, [facility], 9th Floor, Room [address] from [place] [id number].    Transfer Via: Stretcher.    Transfer With: Nothing required    Transported By: PAPO Douglas RN    Medicines Sent: IVF at KVO 20mL/hr    Chart Sent with Patient: Yes.    Notified: mother at the bedside    Patient Reassessed at: 1245 on 08/08/2017    Upon arrival to floor: Patient and patient's mother oriented to room, bed in lowest position, and call bell within reach.

## 2017-08-08 NOTE — SUBJECTIVE & OBJECTIVE
Interval History: Pt had no acute events overnight. This afternoon, pt lying in bed after returning from scope with mother at bedside. Pt reports tolerating procedure well. Discussed plan to resume full liquid diet, advance as tolerated, bentyl with meals, and weaning opioids as tolerated.     Review of Systems   Constitutional: Positive for unexpected weight change. Negative for chills, diaphoresis and fever.   Respiratory: Negative for cough, chest tightness, shortness of breath and wheezing.    Cardiovascular: Negative for chest pain, palpitations and leg swelling.   Gastrointestinal: Positive for abdominal pain, diarrhea and nausea. Negative for blood in stool and vomiting.   Genitourinary: Positive for dysuria. Negative for flank pain, hematuria and pelvic pain.   Musculoskeletal: Positive for back pain. Negative for arthralgias, myalgias and neck pain.   Hematological: Negative for adenopathy. Bruises/bleeds easily (not new).   Psychiatric/Behavioral: Negative for confusion, dysphoric mood and sleep disturbance. The patient is not nervous/anxious.      Objective:     Vital Signs (Most Recent):  Temp: 99 °F (37.2 °C) (08/08/17 1549)  Pulse: 88 (08/08/17 1549)  Resp: 18 (08/08/17 1549)  BP: 133/77 (08/08/17 1549)  SpO2: 99 % (08/08/17 1549) Vital Signs (24h Range):  Temp:  [98.1 °F (36.7 °C)-99.5 °F (37.5 °C)] 99 °F (37.2 °C)  Pulse:  [62-88] 88  Resp:  [14-18] 18  SpO2:  [94 %-100 %] 99 %  BP: (106-140)/(60-93) 133/77     Weight: 42.8 kg (94 lb 5.7 oz)  Body mass index is 17.83 kg/m².    Intake/Output Summary (Last 24 hours) at 08/08/17 1721  Last data filed at 08/08/17 1500   Gross per 24 hour   Intake          1214.78 ml   Output             3250 ml   Net         -2035.22 ml      Physical Exam   Constitutional: She is oriented to person, place, and time. She appears well-developed and well-nourished. No distress.   Cardiovascular: Normal rate and regular rhythm.    No murmur heard.  Pulmonary/Chest: Effort  normal and breath sounds normal. No respiratory distress. She has no wheezes.   Abdominal: Soft. Bowel sounds are normal. She exhibits no distension. There is tenderness (around stoma).   +ostomy in place   Musculoskeletal: Normal range of motion. She exhibits no edema or tenderness.   Neurological: She is alert and oriented to person, place, and time. She has normal reflexes. No cranial nerve deficit.   Skin: Skin is warm and dry. She is not diaphoretic.   Psychiatric: Her behavior is normal. She exhibits a depressed mood.   Nursing note and vitals reviewed.      Significant Labs: All pertinent labs within the past 24 hours have been reviewed.    Significant Imaging: I have reviewed all pertinent imaging results/findings within the past 24 hours.

## 2017-08-08 NOTE — INTERVAL H&P NOTE
The patient has been examined and the H&P has been reviewed:    I concur with the findings and no changes have occurred since H&P was written.     History and Exam unchanged from visit.    Procedure - Ileoscopy  Neck - supple  Plan of anesthesia - General  ASA - per anesthesia  Mallampati - per anesthesia  Anesthesia problems - no  Family history of anesthesia problems - no      Anesthesia/Surgery risks, benefits and alternative options discussed and understood by patient/family.          Active Hospital Problems    Diagnosis  POA    *Abdominal pain [R10.9]  Yes    Depression [F32.9]  Unknown    Essential hypertension, benign [I10]  Yes     Chronic    Crohn's disease [K50.90]  Yes     Chronic    Generalized anxiety disorder [F41.1]  Yes    S/P ileostomy [Z93.2]  Not Applicable      Resolved Hospital Problems    Diagnosis Date Resolved POA   No resolved problems to display.

## 2017-08-08 NOTE — ASSESSMENT & PLAN NOTE
- Increased watery output per patient resulting in irritation around stoma site  - Pt agreeable to WC consult  - Will continue to monitor  - Strict I/Os

## 2017-08-08 NOTE — DISCHARGE INSTRUCTIONS
Colonoscopy     A camera attached to a flexible tube with a viewing lens is used to take video pictures.     Colonoscopy is a test to view the inside of your lower digestive tract (colon and rectum). Sometimes it can show the last part of the small intestine (ileum). During the test, small pieces of tissue may be removed for testing. This is called a biopsy. Small growths, such as polyps, may also be removed.   Why is colonoscopy done?  The test is done to help look for colon cancer. And it can help find the source of abdominal pain, bleeding, and changes in bowel habits. It may be needed once a year, depending on factors such as your:  · Age  · Health history  · Family health history  · Symptoms  · Results from any prior colonoscopy  Risks and possible complications  These include:  · Bleeding               · A puncture or tear in the colon   · Risks of anesthesia  · A cancer lesion not being seen  Getting ready   To prepare for the test:  · Talk with your healthcare provider about the risks of the test (see below). Also ask your healthcare provider about alternatives to the test.  · Tell your healthcare provider about any medicines you take. Also tell him or her about any health conditions you may have.  · Make sure your rectum and colon are empty for the test. Follow the diet and bowel prep instructions exactly. If you dont, the test may need to be rescheduled.  · Plan for a friend or family member to drive you home after the test.     Colonoscopy provides an inside view of the entire colon.     You may discuss the results with your doctor right away or at a future visit.  During the test   The test is usually done in the hospital on an outpatient basis. This means you go home the same day. The procedure takes about 30 minutes. During that time:  · You are given relaxing (sedating) medicine through an IV line. You may be drowsy, or fully asleep.  · The healthcare provider will first give you a physical exam to  check for anal and rectal problems.  · Then the anus is lubricated and the scope inserted.  · If you are awake, you may have a feeling similar to needing to have a bowel movement. You may also feel pressure as air is pumped into the colon. Its OK to pass gas during the procedure.  · Biopsy, polyp removal, or other treatments may be done during the test.  After the test   You may have gas right after the test. It can help to try to pass it to help prevent later bloating. Your healthcare provider may discuss the results with you right away. Or you may need to schedule a follow-up visit to talk about the results. After the test, you can go back to your normal eating and other activities. You may be tired from the sedation and need to rest for a few hours.  Date Last Reviewed: 11/1/2016  © 6053-0468 [employer]. [address] [place] [address], [place] [address]. All rights reserved. This information is not intended as a substitute for professional medical care. Always follow your healthcare professional's instructions.

## 2017-08-08 NOTE — ASSESSMENT & PLAN NOTE
- Pt tearful first few days, improvement noted 8/8  - Restarted celexa however pt declined psych consult (last eval ~1 year ago)  - Will continue to monitor.  No SI/HI/hallucinations.

## 2017-08-08 NOTE — TRANSFER OF CARE
"Anesthesia Transfer of Care Note    Patient: Ivy Salgado    Procedure(s) Performed: Procedure(s) (LRB):  ILEOSCOPY (N/A)    Patient location: [facility]    Anesthesia Type: general    Transport from OR: Transported from OR on 6-10 L/min O2 by face mask with adequate spontaneous ventilation    Post pain: adequate analgesia    Post assessment: no apparent anesthetic complications    Post vital signs: stable    Level of consciousness: sedated    Nausea/Vomiting: no nausea/vomiting    Complications: none    Transfer of care protocol was followed      Last vitals:   Visit Vitals  BP (!) 136/90   Pulse 83   Temp 37.1 °C (98.8 °F) (Oral)   Resp 16   Ht 5' 1" (1.549 m)   Wt 42.8 kg (94 lb 5.7 oz)   LMP 03/30/2015   SpO2 100%   Breastfeeding? No   BMI 17.83 kg/m²     "

## 2017-08-08 NOTE — ASSESSMENT & PLAN NOTE
- Plan per GI was to start stelara.  Pt received loading dose on 7/11/2017.  - GI consulted as above.  Dr. Ross recommendations as above.

## 2017-08-08 NOTE — PLAN OF CARE
Patient arrived from Endoscopy.  Patient stable.  Report received from RAYA Alexandre RN at this time.  Assumed care of patient at this time.

## 2017-08-08 NOTE — PLAN OF CARE
Problem: Fall Risk (Adult)  Goal: Identify Related Risk Factors and Signs and Symptoms  Related risk factors and signs and symptoms are identified upon initiation of Human Response Clinical Practice Guideline (CPG)   Outcome: Ongoing (interventions implemented as appropriate)  Pt remained free of falls throughout the night. Verbalized understanding to call for OOB activities. Safety precautions maintained.    Problem: Patient Care Overview  Goal: Plan of Care Review  Outcome: Ongoing (interventions implemented as appropriate)  Pt AAOx4. VSS at this time. Complaints of generalized abdominal pain. NPO since MN for ileoscopy. No acute events overnight. POC reviewed with pt who verbalized understanding.     Problem: Pain, Chronic (Adult)  Goal: Identify Related Risk Factors and Signs and Symptoms  Related risk factors and signs and symptoms are identified upon initiation of Human Response Clinical Practice Guideline (CPG)   Outcome: Ongoing (interventions implemented as appropriate)  PRN pain medication given consistently throughout the night which provided moderate relief.      Problem: Ileostomy (Adult)  Goal: Signs and Symptoms of Listed Potential Problems Will be Absent, Minimized or Managed (Ileostomy)  Signs and symptoms of listed potential problems will be absent, minimized or managed by discharge/transition of care (reference Ileostomy (Adult) CPG).   Outcome: Ongoing (interventions implemented as appropriate)  Pt independently cleans and changes ileostomy bag. Stoma pink and moist. Complaints of pain d/t skin irritation around stomal area.

## 2017-08-08 NOTE — ASSESSMENT & PLAN NOTE
- Differential includes active Crohn's disease, obstruction.  - Pt afebrile and without leukocytosis. CMP with no acute abnormalities. CRP WNL at 0.9. Lipase and lactic acid WNL. UA unremarkable. ESR 23 and blood culture x 2 with NGTD.  - GI consulted: check c.diff (neg), stool culture (prelim no growth), fecal lactoferrin (in process); UA fairly unremarkable (will repeat); would give patient a full liquid diet and advance as tolerated   - Supportive care with pain medication (attempting to limit) and IVF.  8/7: Dr. Ross would like to proceed with ileoscopy on 8/8.  GI will place 1/2 prep orders.  NPO after midnight.  8/8: Ileoscopy today; findings show widely patent end ileostomy found with healthy appearing mucosa; no other significant abnormalities were identified. Full liquid diet; advance as tolerated. Weaning opioids as tolerated. Blood culture NGTD.

## 2017-08-08 NOTE — PROGRESS NOTES
[facility] Hospital Medicine  Progress Note    Patient Name: Ivy Salgado  MRN: [id number]  Patient Class: IP- Inpatient   Admission Date: 8/4/2017  Length of Stay: 0 days  Attending Physician: Leigh Wiggins MD  Primary Care Provider: Kalia Astorga MD    [facility] Medicine Team: [facility]OSP MED F Shelby Zarco PA-C    Subjective:     Principal Problem:Abdominal pain    HPI:  36 y/o F with h/o Crohn's since she was 9yo s/p surgery 5 years ago for colectomy and rectum removed. She was doing well and in remission until April 2017. She follow with Dr. Ross and was considering starting Stelera.  Pt presented to the ED with fevers (last night 100.7°F),  SOB, nausea without vomiting, headache, lightheadedness, 7/10 abdominal pain that is not relieved with at-home Percocet.   Pt reports increased output in her ostomy that is very watery.  This is cause severe irritation of her stoma and the skin around her stoma. She has noticed some BRB in her ostomy bag but denies any melena.  She was treated for ESBL UTI with Macrobid, ciprofloxacin, then Augmentin (last one week ago).  She reports some continuing dysuria and lower back pain.  She is receiving IV fluids weekly for dehydration and received 2 L on 8/1/17.    In the ED, CBC with no leukocytosis with WBC 7.61. CMP with no acute abnormalities. CRP WNL at 0.9. Lipase WNL at 36. PT/INR 10.8/1.0. Lactic acid WNL at 1.2. UA unremarkable. ESR 23 and blood culture x 2 pending.  GI consulted.      Hospital Course:  Pt admitted to observation for fever, increased abdominal pain and output from ostomy.   CBC with no leukocytosis with WBC 7.61. CMP with no acute abnormalities. CRP WNL at 0.9. Lipase WNL at 36. PT/INR 10.8/1.0. Lactic acid WNL at 1.2. UA unremarkable. ESR 23 and blood culture x 2 NGTD.  GI consulted.  WC consulted.  GI recommended checking CDiff (negative), stool culture (prelim no growth), fecal lactoferrin (in process).  Restarted celexa,  pt declined psych consult.  Dr. Ross to assess on Monday.  8/7 pt to start 1/2 prep and NPO after midnight for ileoscopy on 8/8.  Pt c/o dysuria and third UA ordered although second UA clear.  8/8: Ileoscopy today; findings show widely patent end ileostomy found with healthy appearing mucosa; no other significant abnormalities were identified. Full liquid diet; advance as tolerated. Weaning opioids as tolerated.         Interval History: Pt had no acute events overnight. This afternoon, pt lying in bed after returning from scope with mother at bedside. Pt reports tolerating procedure well. Discussed plan to resume full liquid diet, advance as tolerated, bentyl with meals, and weaning opioids as tolerated.     Review of Systems   Constitutional: Positive for unexpected weight change. Negative for chills, diaphoresis and fever.   Respiratory: Negative for cough, chest tightness, shortness of breath and wheezing.    Cardiovascular: Negative for chest pain, palpitations and leg swelling.   Gastrointestinal: Positive for abdominal pain, diarrhea and nausea. Negative for blood in stool and vomiting.   Genitourinary: Positive for dysuria. Negative for flank pain, hematuria and pelvic pain.   Musculoskeletal: Positive for back pain. Negative for arthralgias, myalgias and neck pain.   Hematological: Negative for adenopathy. Bruises/bleeds easily (not new).   Psychiatric/Behavioral: Negative for confusion, dysphoric mood and sleep disturbance. The patient is not nervous/anxious.      Objective:     Vital Signs (Most Recent):  Temp: 99 °F (37.2 °C) (08/08/17 1549)  Pulse: 88 (08/08/17 1549)  Resp: 18 (08/08/17 1549)  BP: 133/77 (08/08/17 1549)  SpO2: 99 % (08/08/17 1549) Vital Signs (24h Range):  Temp:  [98.1 °F (36.7 °C)-99.5 °F (37.5 °C)] 99 °F (37.2 °C)  Pulse:  [62-88] 88  Resp:  [14-18] 18  SpO2:  [94 %-100 %] 99 %  BP: (106-140)/(60-93) 133/77     Weight: 42.8 kg (94 lb 5.7 oz)  Body mass index is 17.83  kg/m².    Intake/Output Summary (Last 24 hours) at 08/08/17 1721  Last data filed at 08/08/17 1500   Gross per 24 hour   Intake          1214.78 ml   Output             3250 ml   Net         -2035.22 ml      Physical Exam   Constitutional: She is oriented to person, place, and time. She appears well-developed and well-nourished. No distress.   Cardiovascular: Normal rate and regular rhythm.    No murmur heard.  Pulmonary/Chest: Effort normal and breath sounds normal. No respiratory distress. She has no wheezes.   Abdominal: Soft. Bowel sounds are normal. She exhibits no distension. There is tenderness (around stoma).   +ostomy in place   Musculoskeletal: Normal range of motion. She exhibits no edema or tenderness.   Neurological: She is alert and oriented to person, place, and time. She has normal reflexes. No cranial nerve deficit.   Skin: Skin is warm and dry. She is not diaphoretic.   Psychiatric: Her behavior is normal. She exhibits a depressed mood.   Nursing note and vitals reviewed.      Significant Labs: All pertinent labs within the past 24 hours have been reviewed.    Significant Imaging: I have reviewed all pertinent imaging results/findings within the past 24 hours.    Assessment/Plan:      * Abdominal pain    - Differential includes active Crohn's disease, obstruction.  - Pt afebrile and without leukocytosis. CMP with no acute abnormalities. CRP WNL at 0.9. Lipase and lactic acid WNL. UA unremarkable. ESR 23 and blood culture x 2 with NGTD.  - GI consulted: check c.diff (neg), stool culture (prelim no growth), fecal lactoferrin (in process); UA fairly unremarkable (will repeat); would give patient a full liquid diet and advance as tolerated   - Supportive care with pain medication (attempting to limit) and IVF.  8/7: Dr. Ross would like to proceed with ileoscopy on 8/8.  GI will place 1/2 prep orders.  NPO after midnight.  8/8: Ileoscopy today; findings show widely patent end ileostomy found with  healthy appearing mucosa; no other significant abnormalities were identified. Full liquid diet; advance as tolerated. Weaning opioids as tolerated. Blood culture NGTD.            Crohn's disease    - Plan per GI was to start stelara.  Pt received loading dose on 7/11/2017.  - GI consulted as above.  Dr. Ross recommendations as above.          S/P ileostomy    - Increased watery output per patient resulting in irritation around stoma site  - Pt agreeable to WC consult  - Will continue to monitor  - Strict I/Os          Essential hypertension, benign    - BP controlled on admission  - Will hold lisinopril at this time based on increased output  8/8: BP controlled. Will continue to monitor          Depression    - Pt tearful first few days, improvement noted 8/8  - Restarted celexa however pt declined psych consult (last eval ~1 year ago)  - Will continue to monitor.  No SI/HI/hallucinations.          Generalized anxiety disorder    - Will continue xanax            VTE Risk Mitigation         Ordered     Medium Risk of VTE  Once      08/04/17 4449              Shelby Zarco PA-C  Department of Hospital Medicine   [facility]

## 2017-08-08 NOTE — H&P (VIEW-ONLY)
[facility]  Gastroenterology  Consult Note    Patient Name: Ivy Salgado  MRN: [id number]  Admission Date: 8/4/2017  Hospital Length of Stay: 0 days  Code Status: Full Code   Attending Provider: Ant Bourgeois MD   Consulting Provider: Vera Allison MD  Primary Care Physician: Kalia Astorga MD  Principal Problem:Abdominal pain    Inpatient consult to Gastroenterology  Consult performed by: VERA ALLISON  Consult ordered by: RACH FREGOSO        Subjective:     HPI:  38 y/o F with h/o Crohn's since she was 11yo s/p surgery 5 years ago with colectomy and rectum removed. She was doing well and in remission until April 2017, at which time she saw Dr. Ross in clinic, it was noted that she had nausea, vomiting intermittently, abdominal pain and increased ostomy output associated with weight loss, she had been on cimzia for several years, they discussed various options to assess her disease activity and ended up doing an MR enterography which suggested active disease and the decision was made to switch to Stelera, with the first dose being given 7/13/17, and she states that basically she has still been having the same symptoms since April, she has also In the interm at the end of June diagnosed with possible pyelonephritis and was given a shot of rocephin and outpatient augmentin, which she only took about 4-5 days of and was really able to tolerate more secondary to nausea.      Pt presents to the ED now with fevers at home (last night 100.7°F),  SOB, nausea without vomiting, headache, lightheadedness, 7/10 abdominal pain that is not relieved with at-home Percocet.  Pt reports increased output in her ostomy that is very watery and causing severe irritation of her stoma and the skin around her stoma. She has noticed some BRB in her ostomy bag very rarely, small amount, but denies any melena.  She was treated for ESBL UTI as noted and also with Macrobid, ciprofloxacin, then Augmentin  (last one week ago).  She reports some continuing dysuria and lower back pain at times.  She is receiving IV fluids weekly for dehydration and received 2 L on 8/1/17.     In the ED, CBC with WBC 7.61. CMP with no acute abnormalities. CRP WNL at 0.9. Lipase WNL at 36. PT/INR 10.8/1.0. Lactic acid WNL at 1.2. UA unremarkable. ESR 23 and blood culture x 2 pending.      Also of note patient is very tearful and upset upon interview with medical student earlier today and also upon interview currently and admits she had tried celexa in the past for depression and helping to cope with her physical illness and she thinks she may need this again.    Past Medical History:   Diagnosis Date    Abnormal Pap smear 2007    Abnormal Pap smear 5/26/2011    Anemia     Anxiety     Arthritis     C. difficile diarrhea     Crohn's disease     Encounter for blood transfusion     Genital HSV     History of colposcopy with cervical biopsy 2007 and 7/2011 2007-LYLA I  and 7/2011- LYLA I    Hypertension     Kidney stone     Recurrent UTI 4/3/2013    S/P ileostomy 7/9/2012    Sterilization 6/23/2012       Past Surgical History:   Procedure Laterality Date    ABDOMINAL SURGERY      APPENDECTOMY      BLADDER SURGERY      partial cystectomy due to fistula    CKC      COLON SURGERY      COLONOSCOPY      HYSTERECTOMY  04/16/2015    SHELLIE    ILEOSTOMY      OOPHORECTOMY Right 04/16/2015    PORTACATH PLACEMENT      SKIN BIOPSY      SMALL INTESTINE SURGERY      TOTAL COLECTOMY      TUBAL LIGATION  06 06 2012    UPPER GASTROINTESTINAL ENDOSCOPY         Review of patient's allergies indicates:   Allergen Reactions    Azathioprine sodium      Other reaction(s): pancreatitis  Other reaction(s): pancreatitis    Methotrexate      Other reaction(s): infection-    Morphine Itching and Other (See Comments)     Other reaction(s): Itching     Family History     Problem Relation (Age of Onset)    Cancer Father, Maternal Grandfather     Colon cancer Father    Crohn's disease Brother    Endometrial cancer Maternal Aunt    Hypertension Mother    Skin cancer Maternal Grandfather        Social History Main Topics    Smoking status: Never Smoker    Smokeless tobacco: Never Used    Alcohol use 0.0 oz/week      Comment: Patient only drinks wine not regular basis.    Drug use: No    Sexual activity: Yes     Partners: Male     Birth control/ protection: Pill, Surgical, Condom      Comment: HYST     Review of Systems   Constitutional: Positive for appetite change, fatigue, fever and unexpected weight change. Negative for chills.   HENT: Negative for sore throat and trouble swallowing.    Respiratory: Negative for cough and shortness of breath.    Cardiovascular: Negative for chest pain and palpitations.   Gastrointestinal: Positive for abdominal pain, diarrhea and nausea. Negative for abdominal distention and vomiting.   Genitourinary: Positive for dysuria.   Skin: Negative for pallor and rash.   Hematological: Bruises/bleeds easily.   Psychiatric/Behavioral: Negative for agitation and behavioral problems.     Objective:     Vital Signs (Most Recent):  Temp: 98.6 °F (37 °C) (08/04/17 1109)  Pulse: 74 (08/04/17 1431)  Resp: 17 (08/04/17 1109)  BP: (!) 139/98 (08/04/17 1431)  SpO2: 99 % (08/04/17 1431) Vital Signs (24h Range):  Temp:  [98.6 °F (37 °C)] 98.6 °F (37 °C)  Pulse:  [74-98] 74  Resp:  [17] 17  SpO2:  [97 %-100 %] 99 %  BP: (134-139)/(85-98) 139/98     Weight: 42.8 kg (94 lb 5.7 oz) (08/04/17 1600)  Body mass index is 17.83 kg/m².      Intake/Output Summary (Last 24 hours) at 08/04/17 1758  Last data filed at 08/04/17 1600   Gross per 24 hour   Intake                0 ml   Output                0 ml   Net                0 ml       Lines/Drains/Airways     Central Venous Catheter Line                 Port A Cath Single Lumen 02/21/17 0820 left subclavian 164 days          Drain                 Colostomy RLQ 09185 days         Ileostomy  57164  days         Ileostomy RUQ 16957 days                Physical Exam   Constitutional: She is oriented to person, place, and time. She appears well-developed and well-nourished.   HENT:   Head: Normocephalic and atraumatic.   Eyes: Conjunctivae are normal. No scleral icterus.   Neck: Normal range of motion. Neck supple.   Cardiovascular: Normal rate and regular rhythm.  Exam reveals no friction rub.    No murmur heard.  Pulmonary/Chest: Effort normal and breath sounds normal. No respiratory distress. She has no wheezes.   Abdominal: Soft. Bowel sounds are normal. There is no rebound and no guarding.   Mildly tender diffusely to deep palpation, ostomy bag in place with brown stool     Neurological: She is alert and oriented to person, place, and time.   Skin: Skin is warm and dry.   Psychiatric: She has a normal mood and affect. Her behavior is normal.       Significant Labs:  All pertinent lab results from the last 24 hours have been reviewed.    Significant Imaging:  Imaging results within the past 24 hours have been reviewed.    Assessment/Plan:     Crohn's disease    38 y/o F with h/o small bowel and colonic Crohn's since she was 11yo s/p surgery 5 years ago with colectomy and rectum removed. She was doing well and in remission until April 2017, at which time she saw Dr. Ross in clinic, it was noted that she had nausea, vomiting intermittently, abdominal pain and increased ostomy output associated with weight loss, she had been on cimzia for several years, MR enterography suggested active disease in the small bowel and the decision was made to switch to Stelera, with the first dose being given 7/13/17, and she states that basically she has still been having the same symptoms since April, also pyelonephritis and was given a shot of rocephin and outpatient augmentin, which she only took about 4-5 days of and was really able to tolerate more secondary to nausea.      Pt presents to the ED now with fevers at home (last  night 100.7°F),  SOB, nausea without vomiting, headache, lightheadedness, 7/10 abdominal pain that is not relieved with at-home Percocet.  Pt reports increased output in her ostomy that is very watery and causing severe irritation of her stoma and the skin around her stoma.  She reports some continuing dysuria and lower back pain at times.      In the ED, CBC with WBC 7.61. CMP with no acute abnormalities. CRP WNL at 0.9. Lipase WNL at 36. PT/INR 10.8/1.0. Lactic acid WNL at 1.2. UA unremarkable. ESR 23 and blood culture x 2 pending.      Also of note patient is very tearful and upset upon interview with medical student earlier today and also upon interview currently and admits she had tried celexa in the past for depression and helping to cope with her physical illness and she thinks she may need this again.    Recommendations:  - check c.diff, stool culture, fecal lactoferrin   - check urine culture and blood culture  - would give patient a full liquid diet and advance as tolerated   - IVFs and antiemetics  - would minimize the use of opiate pain medications and try to use tylenol and tramadol if needed   - consider restarting celexa, patient was on this in the past with good response  - will discuss patients case with Dr. Ross on Monday and decide on any further treatment options.  Would like to avoid steroids given normal CRP, relatively benign abdominal exam with normal labs currently, and would certainly like to have infectious workup first, including C.diff studies given recent antibiotic use and previous history of C.diff, however it was recently negative 9 days ago        Thank you for your consult. I will follow-up with patient. Please contact us if you have any additional questions.    Fausto Landry MD  Gastroenterology Fellow, PGY 6  [facility]

## 2017-08-09 LAB
ANION GAP SERPL CALC-SCNC: 6 MMOL/L
BACTERIA BLD CULT: NORMAL
BACTERIA BLD CULT: NORMAL
BASOPHILS # BLD AUTO: 0.02 K/UL
BASOPHILS NFR BLD: 0.3 %
BUN SERPL-MCNC: 6 MG/DL
CALCIUM SERPL-MCNC: 8.3 MG/DL
CHLORIDE SERPL-SCNC: 107 MMOL/L
CO2 SERPL-SCNC: 28 MMOL/L
CREAT SERPL-MCNC: 0.8 MG/DL
DIFFERENTIAL METHOD: ABNORMAL
EOSINOPHIL # BLD AUTO: 0.1 K/UL
EOSINOPHIL NFR BLD: 1.3 %
ERYTHROCYTE [DISTWIDTH] IN BLOOD BY AUTOMATED COUNT: 13.3 %
EST. GFR  (AFRICAN AMERICAN): >60 ML/MIN/1.73 M^2
EST. GFR  (NON AFRICAN AMERICAN): >60 ML/MIN/1.73 M^2
GLUCOSE SERPL-MCNC: 99 MG/DL
HCT VFR BLD AUTO: 33.2 %
HGB BLD-MCNC: 10.9 G/DL
LYMPHOCYTES # BLD AUTO: 2.1 K/UL
LYMPHOCYTES NFR BLD: 26.9 %
MAGNESIUM SERPL-MCNC: 1.5 MG/DL
MCH RBC QN AUTO: 30.1 PG
MCHC RBC AUTO-ENTMCNC: 32.8 G/DL
MCV RBC AUTO: 92 FL
MONOCYTES # BLD AUTO: 0.9 K/UL
MONOCYTES NFR BLD: 11.8 %
NEUTROPHILS # BLD AUTO: 4.5 K/UL
NEUTROPHILS NFR BLD: 59.4 %
PHOSPHATE SERPL-MCNC: 3 MG/DL
PLATELET # BLD AUTO: 203 K/UL
PMV BLD AUTO: 8.9 FL
POTASSIUM SERPL-SCNC: 3.5 MMOL/L
RBC # BLD AUTO: 3.62 M/UL
SODIUM SERPL-SCNC: 141 MMOL/L
WBC # BLD AUTO: 7.63 K/UL

## 2017-08-09 PROCEDURE — 85025 COMPLETE CBC W/AUTO DIFF WBC: CPT

## 2017-08-09 PROCEDURE — 25000003 PHARM REV CODE 250: Performed by: PHYSICIAN ASSISTANT

## 2017-08-09 PROCEDURE — 63600175 PHARM REV CODE 636 W HCPCS: Performed by: PHYSICIAN ASSISTANT

## 2017-08-09 PROCEDURE — 11000001 HC ACUTE MED/SURG PRIVATE ROOM

## 2017-08-09 PROCEDURE — 84100 ASSAY OF PHOSPHORUS: CPT

## 2017-08-09 PROCEDURE — 80048 BASIC METABOLIC PNL TOTAL CA: CPT

## 2017-08-09 PROCEDURE — 99232 SBSQ HOSP IP/OBS MODERATE 35: CPT | Mod: ,,, | Performed by: PHYSICIAN ASSISTANT

## 2017-08-09 PROCEDURE — 83735 ASSAY OF MAGNESIUM: CPT

## 2017-08-09 RX ORDER — LANOLIN ALCOHOL/MO/W.PET/CERES
400 CREAM (GRAM) TOPICAL EVERY 4 HOURS
Status: COMPLETED | OUTPATIENT
Start: 2017-08-09 | End: 2017-08-09

## 2017-08-09 RX ORDER — AMITRIPTYLINE HYDROCHLORIDE 10 MG/1
10 TABLET, FILM COATED ORAL NIGHTLY
Status: DISCONTINUED | OUTPATIENT
Start: 2017-08-09 | End: 2017-08-10 | Stop reason: HOSPADM

## 2017-08-09 RX ORDER — HYDROMORPHONE HYDROCHLORIDE 1 MG/ML
0.5 INJECTION, SOLUTION INTRAMUSCULAR; INTRAVENOUS; SUBCUTANEOUS EVERY 8 HOURS PRN
Status: DISCONTINUED | OUTPATIENT
Start: 2017-08-09 | End: 2017-08-10 | Stop reason: HOSPADM

## 2017-08-09 RX ADMIN — CITALOPRAM HYDROBROMIDE 20 MG: 20 TABLET ORAL at 08:08

## 2017-08-09 RX ADMIN — MAGNESIUM OXIDE TAB 400 MG (241.3 MG ELEMENTAL MG) 400 MG: 400 (241.3 MG) TAB at 10:08

## 2017-08-09 RX ADMIN — DICYCLOMINE HYDROCHLORIDE 20 MG: 20 TABLET ORAL at 05:08

## 2017-08-09 RX ADMIN — MAGNESIUM OXIDE TAB 400 MG (241.3 MG ELEMENTAL MG) 400 MG: 400 (241.3 MG) TAB at 09:08

## 2017-08-09 RX ADMIN — OXYCODONE HYDROCHLORIDE AND ACETAMINOPHEN 1 TABLET: 10; 325 TABLET ORAL at 01:08

## 2017-08-09 RX ADMIN — MAGNESIUM OXIDE TAB 400 MG (241.3 MG ELEMENTAL MG) 400 MG: 400 (241.3 MG) TAB at 05:08

## 2017-08-09 RX ADMIN — ZOLPIDEM TARTRATE 10 MG: 5 TABLET, FILM COATED ORAL at 09:08

## 2017-08-09 RX ADMIN — ALPRAZOLAM 0.5 MG: 0.5 TABLET ORAL at 08:08

## 2017-08-09 RX ADMIN — MAGNESIUM OXIDE TAB 400 MG (241.3 MG ELEMENTAL MG) 400 MG: 400 (241.3 MG) TAB at 02:08

## 2017-08-09 RX ADMIN — OXYCODONE HYDROCHLORIDE AND ACETAMINOPHEN 1 TABLET: 10; 325 TABLET ORAL at 03:08

## 2017-08-09 RX ADMIN — HYDROMORPHONE HYDROCHLORIDE 0.5 MG: 1 INJECTION, SOLUTION INTRAMUSCULAR; INTRAVENOUS; SUBCUTANEOUS at 12:08

## 2017-08-09 RX ADMIN — OXYCODONE HYDROCHLORIDE AND ACETAMINOPHEN 1 TABLET: 10; 325 TABLET ORAL at 07:08

## 2017-08-09 RX ADMIN — ALPRAZOLAM 0.5 MG: 0.5 TABLET ORAL at 09:08

## 2017-08-09 RX ADMIN — HYDROMORPHONE HYDROCHLORIDE 0.5 MG: 1 INJECTION, SOLUTION INTRAMUSCULAR; INTRAVENOUS; SUBCUTANEOUS at 05:08

## 2017-08-09 RX ADMIN — ONDANSETRON 4 MG: 2 INJECTION INTRAMUSCULAR; INTRAVENOUS at 08:08

## 2017-08-09 RX ADMIN — OXYCODONE HYDROCHLORIDE AND ACETAMINOPHEN 1 TABLET: 10; 325 TABLET ORAL at 08:08

## 2017-08-09 RX ADMIN — PROMETHAZINE HYDROCHLORIDE 12.5 MG: 12.5 TABLET ORAL at 10:08

## 2017-08-09 RX ADMIN — DICYCLOMINE HYDROCHLORIDE 20 MG: 20 TABLET ORAL at 09:08

## 2017-08-09 RX ADMIN — AMITRIPTYLINE HYDROCHLORIDE 10 MG: 10 TABLET, FILM COATED ORAL at 09:08

## 2017-08-09 RX ADMIN — DICYCLOMINE HYDROCHLORIDE 20 MG: 20 TABLET ORAL at 10:08

## 2017-08-09 RX ADMIN — ONDANSETRON 4 MG: 2 INJECTION INTRAMUSCULAR; INTRAVENOUS at 02:08

## 2017-08-09 RX ADMIN — HYDROMORPHONE HYDROCHLORIDE 0.5 MG: 1 INJECTION, SOLUTION INTRAMUSCULAR; INTRAVENOUS; SUBCUTANEOUS at 09:08

## 2017-08-09 NOTE — PROGRESS NOTES
[facility] Hospital Medicine  Progress Note    Patient Name: Ivy Salgado  MRN: [id number]  Patient Class: IP- Inpatient   Admission Date: 8/4/2017  Length of Stay: 1 days  Attending Physician: Leigh Wiggins MD  Primary Care Provider: Kalia Astorga MD    [facility] Medicine Team: [facility] F Shelby Zarco PA-C    Subjective:     Principal Problem:Abdominal pain    HPI:  36 y/o F with h/o Crohn's since she was 11yo s/p surgery 5 years ago for colectomy and rectum removed. She was doing well and in remission until April 2017. She follow with Dr. Ross and was considering starting Stelera.  Pt presented to the ED with fevers (last night 100.7°F),  SOB, nausea without vomiting, headache, lightheadedness, 7/10 abdominal pain that is not relieved with at-home Percocet.   Pt reports increased output in her ostomy that is very watery.  This is cause severe irritation of her stoma and the skin around her stoma. She has noticed some BRB in her ostomy bag but denies any melena.  She was treated for ESBL UTI with Macrobid, ciprofloxacin, then Augmentin (last one week ago).  She reports some continuing dysuria and lower back pain.  She is receiving IV fluids weekly for dehydration and received 2 L on 8/1/17.    In the ED, CBC with no leukocytosis with WBC 7.61. CMP with no acute abnormalities. CRP WNL at 0.9. Lipase WNL at 36. PT/INR 10.8/1.0. Lactic acid WNL at 1.2. UA unremarkable. ESR 23 and blood culture x 2 pending.  GI consulted.      Hospital Course:  Pt admitted to observation for fever, increased abdominal pain and output from ostomy.   CBC with no leukocytosis with WBC 7.61. CMP with no acute abnormalities. CRP WNL at 0.9. Lipase WNL at 36. PT/INR 10.8/1.0. Lactic acid WNL at 1.2. UA unremarkable. ESR 23 and blood culture x 2 NGTD.  GI consulted.  WC consulted.  GI recommended checking CDiff (negative), stool culture (prelim no growth), fecal lactoferrin (in process).  Restarted celexa,  pt declined psych consult.  Dr. Ross to assess on Monday.  8/7 pt to start 1/2 prep and NPO after midnight for ileoscopy on 8/8.  Pt c/o dysuria and third UA ordered although second UA clear.  8/8: Ileoscopy today; findings show widely patent end ileostomy found with healthy appearing mucosa; no other significant abnormalities were identified. Full liquid diet; advance as tolerated. Weaning opioids as tolerated.   8/9: Limited PO intake. Weaning opioids. Added Elavil qhs.    Interval History: Pt had no acute events overnight. This AM, pt sitting upright in bed. Pt reports tolerating liquid diet with minimal discomfort. Pt only able to tolerate minimal advanced diet for lunch. Continuing to wean opioids as tolerated. Pt aware of plan.    Review of Systems   Constitutional: Positive for unexpected weight change. Negative for chills, diaphoresis and fever.   Respiratory: Negative for cough, chest tightness, shortness of breath and wheezing.    Cardiovascular: Negative for chest pain, palpitations and leg swelling.   Gastrointestinal: Positive for abdominal pain, diarrhea and nausea. Negative for blood in stool and vomiting.   Genitourinary: Positive for dysuria. Negative for flank pain, hematuria and pelvic pain.   Musculoskeletal: Positive for back pain. Negative for arthralgias, myalgias and neck pain.   Hematological: Negative for adenopathy. Bruises/bleeds easily (not new).   Psychiatric/Behavioral: Negative for confusion, dysphoric mood and sleep disturbance. The patient is not nervous/anxious.      Objective:     Vital Signs (Most Recent):  Temp: 98.6 °F (37 °C) (08/09/17 1527)  Pulse: 69 (08/09/17 1527)  Resp: 18 (08/09/17 1527)  BP: (!) 140/85 (08/09/17 1527)  SpO2: 96 % (08/09/17 1527) Vital Signs (24h Range):  Temp:  [98.5 °F (36.9 °C)-99.2 °F (37.3 °C)] 98.6 °F (37 °C)  Pulse:  [67-84] 69  Resp:  [16-18] 18  SpO2:  [94 %-97 %] 96 %  BP: (116-150)/(61-98) 140/85     Weight: 42.8 kg (94 lb 5.7 oz)  Body mass  index is 17.83 kg/m².    Intake/Output Summary (Last 24 hours) at 08/09/17 1747  Last data filed at 08/09/17 1500   Gross per 24 hour   Intake           368.33 ml   Output             3950 ml   Net         -3581.67 ml      Physical Exam   Constitutional: She is oriented to person, place, and time. She appears well-developed and well-nourished. No distress.   Cardiovascular: Normal rate and regular rhythm.    No murmur heard.  Pulmonary/Chest: Effort normal and breath sounds normal. No respiratory distress. She has no wheezes.   Abdominal: Soft. Bowel sounds are normal. She exhibits no distension. There is tenderness (around stoma).   +ostomy in place   Musculoskeletal: Normal range of motion. She exhibits no edema or tenderness.   Neurological: She is alert and oriented to person, place, and time. She has normal reflexes. No cranial nerve deficit.   Skin: Skin is warm and dry. She is not diaphoretic.   Psychiatric: Her behavior is normal. She exhibits a depressed mood.   Nursing note and vitals reviewed.      Significant Labs: All pertinent labs within the past 24 hours have been reviewed.    Significant Imaging: I have reviewed all pertinent imaging results/findings within the past 24 hours.    Assessment/Plan:      * Abdominal pain    - Differential includes active Crohn's disease, obstruction.  - Pt afebrile and without leukocytosis. CMP with no acute abnormalities. CRP WNL at 0.9. Lipase and lactic acid WNL. UA unremarkable. ESR 23 and blood culture x 2 with NGTD.  - GI consulted: check c.diff (neg), stool culture (prelim no growth), fecal lactoferrin (in process); UA fairly unremarkable (will repeat); would give patient a full liquid diet and advance as tolerated   - Supportive care with pain medication (attempting to limit) and IVF.  8/7: Dr. Ross would like to proceed with ileoscopy on 8/8.  GI will place 1/2 prep orders.  NPO after midnight.  8/8: Ileoscopy today; findings show widely patent end ileostomy  found with healthy appearing mucosa; no other significant abnormalities were identified. Full liquid diet; advance as tolerated. Weaning opioids as tolerated. Blood culture NGTD.  8/9: Limited PO intake. Weaning opioids. Added Elavil qhs. Blood cultures NGTD.        Crohn's disease    - Plan per GI was to start stelara.  Pt received loading dose on 7/11/2017.  - GI consulted as above.  Dr. Ross recommendations as above.          S/P ileostomy    - Increased watery output per patient resulting in irritation around stoma site  - Pt agreeable to WC consult  - Will continue to monitor  - Strict I/Os          Essential hypertension, benign    - BP controlled on admission  - Will hold lisinopril at this time based on increased output  8/9: BP controlled. Will continue to monitor          Depression    - Pt tearful first few days, improvement noted 8/8  - Restarted celexa however pt declined psych consult (last eval ~1 year ago)  - Will continue to monitor.  No SI/HI/hallucinations.          Generalized anxiety disorder    - Will continue xanax            VTE Risk Mitigation         Ordered     Medium Risk of VTE  Once      08/04/17 5445              Shelby Zarco PA-C  Department of Hospital Medicine   [facility]

## 2017-08-09 NOTE — ASSESSMENT & PLAN NOTE
- BP controlled on admission  - Will hold lisinopril at this time based on increased output  8/9: BP controlled. Will continue to monitor

## 2017-08-09 NOTE — TREATMENT PLAN
GI Note    Ileoscopy yesterday showed normal mucosa; biopsies pending for evaluation of microscopic disease.      Patient was recently started on Stelara, and we would recommend continuing this regimen.     As an adjunct for her pain control, we would also recommend considering starting a TCA:  - Amitriptyline 12.5mg QHS for 1 week,   - then 25mg QHS thereafter, with consideration of uptitration in clinic.      Kimo Hoyt   Gastroenterology Fellow PGY VI  [phone]

## 2017-08-09 NOTE — PLAN OF CARE
Extended Emergency Contact Information  Primary Emergency Contact: Milan Salgado  Address: [address] [place] [address]           [place] [address] [facility]  Home Phone: [phone]  Mobile Phone: [phone]  Relation: Father  Secondary Emergency Contact: Shaila Salgado   [place]  Mobile Phone: [phone]  Relation: Mother    Kalia David Astorga MD  [address] / [place] [address]    Future Appointments  Date Time Provider Department Center   9/18/2017 8:00 AM Parish Ross MD [facility]     Payor: [employer] / Plan: [employer] / Product Type: Self Funded /       [id number] [place] - [address]  [address]  [place] [address]  Phone: [phone] Fax: [phone]    [facility] (prev. [facility]) - [place] - [address] [facility]  [address]  [address]  [place] [address]  Phone: [phone] Fax: [phone]       08/09/17 1546   Discharge Assessment   Assessment Type Discharge Planning Assessment   Confirmed/corrected address and phone number on facesheet? Yes   Assessment information obtained from? Patient   Expected Length of Stay (days) 2   Communicated expected length of stay with patient/caregiver yes   Prior to hospitilization cognitive status: Alert/Oriented   Prior to hospitalization functional status: Independent   Current cognitive status: Alert/Oriented   Current Functional Status: Independent   Arrived From home or self-care   Lives With child(katalina), dependent   Able to Return to Prior Arrangements yes   Is patient able to care for self after discharge? Yes   How many people do you have in your home that can help with your care after discharge? 0   Patient's perception of discharge disposition home or selfcare   Readmission Within The Last 30 Days no previous admission in last 30 days   Patient currently being followed by outpatient case management? No   Patient currently receives home  health services? No   Does the patient currently use HME? No   Patient currently receives private duty nursing? N/A   Patient currently receives any other outside agency services? No   Equipment Currently Used at Home colostomy/ostomy supplies   Do you have any problems affording any of your prescribed medications? No   Is the patient taking medications as prescribed? yes   Do you have any financial concerns preventing you from receiving the healthcare you need? No   Does the patient have transportation to healthcare appointments? Yes   Transportation Available car   On Dialysis? No   Does the patient receive services at the Coumadin Clinic? No   Are there any open cases? No   Discharge Plan A Home   Discharge Plan B Home with family   Patient/Family In Agreement With Plan yes

## 2017-08-09 NOTE — SUBJECTIVE & OBJECTIVE
Interval History: Pt had no acute events overnight. This AM, pt sitting upright in bed. Pt reports tolerating liquid diet with minimal discomfort. Pt only able to tolerate minimal advanced diet for lunch. Continuing to wean opioids as tolerated. Pt aware of plan.    Review of Systems   Constitutional: Positive for unexpected weight change. Negative for chills, diaphoresis and fever.   Respiratory: Negative for cough, chest tightness, shortness of breath and wheezing.    Cardiovascular: Negative for chest pain, palpitations and leg swelling.   Gastrointestinal: Positive for abdominal pain, diarrhea and nausea. Negative for blood in stool and vomiting.   Genitourinary: Positive for dysuria. Negative for flank pain, hematuria and pelvic pain.   Musculoskeletal: Positive for back pain. Negative for arthralgias, myalgias and neck pain.   Hematological: Negative for adenopathy. Bruises/bleeds easily (not new).   Psychiatric/Behavioral: Negative for confusion, dysphoric mood and sleep disturbance. The patient is not nervous/anxious.      Objective:     Vital Signs (Most Recent):  Temp: 98.6 °F (37 °C) (08/09/17 1527)  Pulse: 69 (08/09/17 1527)  Resp: 18 (08/09/17 1527)  BP: (!) 140/85 (08/09/17 1527)  SpO2: 96 % (08/09/17 1527) Vital Signs (24h Range):  Temp:  [98.5 °F (36.9 °C)-99.2 °F (37.3 °C)] 98.6 °F (37 °C)  Pulse:  [67-84] 69  Resp:  [16-18] 18  SpO2:  [94 %-97 %] 96 %  BP: (116-150)/(61-98) 140/85     Weight: 42.8 kg (94 lb 5.7 oz)  Body mass index is 17.83 kg/m².    Intake/Output Summary (Last 24 hours) at 08/09/17 1747  Last data filed at 08/09/17 1500   Gross per 24 hour   Intake           368.33 ml   Output             3950 ml   Net         -3581.67 ml      Physical Exam   Constitutional: She is oriented to person, place, and time. She appears well-developed and well-nourished. No distress.   Cardiovascular: Normal rate and regular rhythm.    No murmur heard.  Pulmonary/Chest: Effort normal and breath sounds  normal. No respiratory distress. She has no wheezes.   Abdominal: Soft. Bowel sounds are normal. She exhibits no distension. There is tenderness (around stoma).   +ostomy in place   Musculoskeletal: Normal range of motion. She exhibits no edema or tenderness.   Neurological: She is alert and oriented to person, place, and time. She has normal reflexes. No cranial nerve deficit.   Skin: Skin is warm and dry. She is not diaphoretic.   Psychiatric: Her behavior is normal. She exhibits a depressed mood.   Nursing note and vitals reviewed.      Significant Labs: All pertinent labs within the past 24 hours have been reviewed.    Significant Imaging: I have reviewed all pertinent imaging results/findings within the past 24 hours.

## 2017-08-09 NOTE — ASSESSMENT & PLAN NOTE
- Differential includes active Crohn's disease, obstruction.  - Pt afebrile and without leukocytosis. CMP with no acute abnormalities. CRP WNL at 0.9. Lipase and lactic acid WNL. UA unremarkable. ESR 23 and blood culture x 2 with NGTD.  - GI consulted: check c.diff (neg), stool culture (prelim no growth), fecal lactoferrin (in process); UA fairly unremarkable (will repeat); would give patient a full liquid diet and advance as tolerated   - Supportive care with pain medication (attempting to limit) and IVF.  8/7: Dr. Ross would like to proceed with ileoscopy on 8/8.  GI will place 1/2 prep orders.  NPO after midnight.  8/8: Ileoscopy today; findings show widely patent end ileostomy found with healthy appearing mucosa; no other significant abnormalities were identified. Full liquid diet; advance as tolerated. Weaning opioids as tolerated. Blood culture NGTD.  8/9: Limited PO intake. Weaning opioids. Added Elavil qhs. Blood cultures NGTD.

## 2017-08-09 NOTE — ANESTHESIA POSTPROCEDURE EVALUATION
"Anesthesia Post Evaluation    Patient: Ivy Salgado    Procedure(s) Performed: Procedure(s) (LRB):  ILEOSCOPY (N/A)    Final Anesthesia Type: general  Patient location during evaluation: PACU  Patient participation: Yes- Able to Participate  Level of consciousness: awake and alert  Post-procedure vital signs: reviewed and stable  Pain management: adequate  Airway patency: patent  PONV status at discharge: No PONV  Anesthetic complications: no      Cardiovascular status: blood pressure returned to baseline and stable  Respiratory status: unassisted  Hydration status: euvolemic  Follow-up not needed.        Visit Vitals  /61   Pulse 74   Temp 37 °C (98.6 °F)   Resp 18   Ht 5' 1" (1.549 m)   Wt 42.8 kg (94 lb 5.7 oz)   LMP 03/30/2015   SpO2 96%   Breastfeeding? No   BMI 17.83 kg/m²       Pain/Michael Score: Pain Assessment Performed: Yes (8/9/2017  8:04 AM)  Presence of Pain: complains of pain/discomfort (8/9/2017  8:04 AM)  Pain Rating Prior to Med Admin: 9 (8/9/2017  8:04 AM)  Pain Rating Post Med Admin: 3 (8/9/2017  9:04 AM)  Michael Score: 10 (8/8/2017 11:10 AM)      "

## 2017-08-09 NOTE — PLAN OF CARE
Problem: Fall Risk (Adult)  Goal: Absence of Falls  Patient will demonstrate the desired outcomes by discharge/transition of care.   Outcome: Ongoing (interventions implemented as appropriate)  Patient reports no falls this shift.Fall interventions in place nurses call bell within reach.

## 2017-08-09 NOTE — PLAN OF CARE
Problem: Patient Care Overview  Goal: Plan of Care Review  Outcome: Ongoing (interventions implemented as appropriate)  Pt AAOx4. VSS at this time. No acute events overnight. Tolerated full liquid diet would benefit if advanced. Pain medication administered consistently which provided mild relief. Nausea medication administered once. POC reviewed with pt who verbalized understanding.

## 2017-08-10 VITALS
WEIGHT: 94.38 LBS | BODY MASS INDEX: 17.82 KG/M2 | HEIGHT: 61 IN | DIASTOLIC BLOOD PRESSURE: 74 MMHG | RESPIRATION RATE: 18 BRPM | TEMPERATURE: 98 F | HEART RATE: 83 BPM | OXYGEN SATURATION: 92 % | SYSTOLIC BLOOD PRESSURE: 122 MMHG

## 2017-08-10 LAB
ANION GAP SERPL CALC-SCNC: 10 MMOL/L
BASOPHILS # BLD AUTO: 0.01 K/UL
BASOPHILS NFR BLD: 0.1 %
BUN SERPL-MCNC: 7 MG/DL
CALCIUM SERPL-MCNC: 8.6 MG/DL
CHLORIDE SERPL-SCNC: 103 MMOL/L
CO2 SERPL-SCNC: 27 MMOL/L
CREAT SERPL-MCNC: 0.8 MG/DL
DIFFERENTIAL METHOD: ABNORMAL
EOSINOPHIL # BLD AUTO: 0.1 K/UL
EOSINOPHIL NFR BLD: 0.8 %
ERYTHROCYTE [DISTWIDTH] IN BLOOD BY AUTOMATED COUNT: 13.3 %
EST. GFR  (AFRICAN AMERICAN): >60 ML/MIN/1.73 M^2
EST. GFR  (NON AFRICAN AMERICAN): >60 ML/MIN/1.73 M^2
GLUCOSE SERPL-MCNC: 92 MG/DL
HCT VFR BLD AUTO: 34.1 %
HGB BLD-MCNC: 11.6 G/DL
LYMPHOCYTES # BLD AUTO: 2 K/UL
LYMPHOCYTES NFR BLD: 24 %
MAGNESIUM SERPL-MCNC: 1.9 MG/DL
MCH RBC QN AUTO: 30.4 PG
MCHC RBC AUTO-ENTMCNC: 34 G/DL
MCV RBC AUTO: 90 FL
MONOCYTES # BLD AUTO: 0.8 K/UL
MONOCYTES NFR BLD: 9 %
NEUTROPHILS # BLD AUTO: 5.5 K/UL
NEUTROPHILS NFR BLD: 65.7 %
PLATELET # BLD AUTO: 236 K/UL
PMV BLD AUTO: 9 FL
POTASSIUM SERPL-SCNC: 3.7 MMOL/L
RBC # BLD AUTO: 3.81 M/UL
SODIUM SERPL-SCNC: 140 MMOL/L
WBC # BLD AUTO: 8.34 K/UL

## 2017-08-10 PROCEDURE — 25000003 PHARM REV CODE 250: Performed by: PHYSICIAN ASSISTANT

## 2017-08-10 PROCEDURE — 85025 COMPLETE CBC W/AUTO DIFF WBC: CPT

## 2017-08-10 PROCEDURE — 63600175 PHARM REV CODE 636 W HCPCS: Performed by: PHYSICIAN ASSISTANT

## 2017-08-10 PROCEDURE — 99239 HOSP IP/OBS DSCHRG MGMT >30: CPT | Mod: ,,, | Performed by: PHYSICIAN ASSISTANT

## 2017-08-10 PROCEDURE — 83735 ASSAY OF MAGNESIUM: CPT

## 2017-08-10 PROCEDURE — 80048 BASIC METABOLIC PNL TOTAL CA: CPT

## 2017-08-10 RX ORDER — HEPARIN SODIUM 1000 [USP'U]/ML
3000 INJECTION, SOLUTION INTRAVENOUS; SUBCUTANEOUS ONCE
Status: DISCONTINUED | OUTPATIENT
Start: 2017-08-10 | End: 2017-08-10

## 2017-08-10 RX ORDER — HEPARIN SODIUM (PORCINE) LOCK FLUSH IV SOLN 100 UNIT/ML 100 UNIT/ML
500 SOLUTION INTRAVENOUS ONCE
Status: DISCONTINUED | OUTPATIENT
Start: 2017-08-10 | End: 2017-08-10

## 2017-08-10 RX ORDER — AMITRIPTYLINE HYDROCHLORIDE 10 MG/1
10 TABLET, FILM COATED ORAL NIGHTLY
Qty: 30 TABLET | Refills: 0 | Status: SHIPPED | OUTPATIENT
Start: 2017-08-10 | End: 2017-10-02

## 2017-08-10 RX ORDER — HEPARIN 100 UNIT/ML
3 SYRINGE INTRAVENOUS ONCE
Status: COMPLETED | OUTPATIENT
Start: 2017-08-10 | End: 2017-08-10

## 2017-08-10 RX ADMIN — SODIUM CHLORIDE 2000 ML: 0.9 INJECTION, SOLUTION INTRAVENOUS at 09:08

## 2017-08-10 RX ADMIN — DICYCLOMINE HYDROCHLORIDE 20 MG: 20 TABLET ORAL at 11:08

## 2017-08-10 RX ADMIN — CITALOPRAM HYDROBROMIDE 20 MG: 20 TABLET ORAL at 08:08

## 2017-08-10 RX ADMIN — OXYCODONE HYDROCHLORIDE AND ACETAMINOPHEN 1 TABLET: 10; 325 TABLET ORAL at 08:08

## 2017-08-10 RX ADMIN — PROMETHAZINE HYDROCHLORIDE 12.5 MG: 12.5 TABLET ORAL at 05:08

## 2017-08-10 RX ADMIN — ONDANSETRON 4 MG: 2 INJECTION INTRAMUSCULAR; INTRAVENOUS at 08:08

## 2017-08-10 RX ADMIN — OXYCODONE HYDROCHLORIDE AND ACETAMINOPHEN 1 TABLET: 10; 325 TABLET ORAL at 12:08

## 2017-08-10 RX ADMIN — OXYCODONE HYDROCHLORIDE AND ACETAMINOPHEN 1 TABLET: 10; 325 TABLET ORAL at 11:08

## 2017-08-10 RX ADMIN — HYDROMORPHONE HYDROCHLORIDE 0.5 MG: 1 INJECTION, SOLUTION INTRAMUSCULAR; INTRAVENOUS; SUBCUTANEOUS at 05:08

## 2017-08-10 RX ADMIN — ONDANSETRON 4 MG: 2 INJECTION INTRAMUSCULAR; INTRAVENOUS at 12:08

## 2017-08-10 RX ADMIN — DICYCLOMINE HYDROCHLORIDE 20 MG: 20 TABLET ORAL at 05:08

## 2017-08-10 RX ADMIN — ALPRAZOLAM 0.5 MG: 0.5 TABLET ORAL at 08:08

## 2017-08-10 RX ADMIN — HEPARIN SODIUM (PORCINE) LOCK FLUSH IV SOLN 100 UNIT/ML 300 UNITS: 100 SOLUTION at 02:08

## 2017-08-10 NOTE — ASSESSMENT & PLAN NOTE
- Differential includes active Crohn's disease, obstruction.  - Pt afebrile and without leukocytosis. CMP with no acute abnormalities. CRP WNL at 0.9. Lipase and lactic acid WNL. UA unremarkable. ESR 23 and blood culture x 2 with NGTD.  - GI consulted: check c.diff (neg), stool culture (prelim no growth), fecal lactoferrin (in process); UA fairly unremarkable (will repeat); would give patient a full liquid diet and advance as tolerated   - Supportive care with pain medication (attempting to limit) and IVF.  8/7: Dr. Ross would like to proceed with ileoscopy on 8/8.  GI will place 1/2 prep orders.  NPO after midnight.  8/8: Ileoscopy today; findings show widely patent end ileostomy found with healthy appearing mucosa; no other significant abnormalities were identified. Full liquid diet; advance as tolerated. Weaning opioids as tolerated. Blood culture NGTD.  8/9: Limited PO intake. Weaning opioids. Added Elavil qhs. Blood cultures NGTD.  8/10: Biopsy results show unremarkable small bowel mucosa; no evidence of active colitis, granuloma, dysplasia or malignancy. Pt discharged home on outpatient regimen and Elavil. Pt to follow up with PCP and GI as an outpatient.

## 2017-08-10 NOTE — PLAN OF CARE
Problem: Ileostomy (Adult)  Intervention: Monitor/Manage Acute Postoperative Pain   08/10/17 0530   Pain/Comfort/Sleep Interventions   Pain Management Interventions around-the-clock dosing utilized;care clustered;diversional activity provided;pain management plan reviewed with patient/caregiver;relaxation promoted;quiet environment facilitated   Patient care clustered and pain medication given when patient requested.  Patient takes care of own ostomy.

## 2017-08-10 NOTE — ASSESSMENT & PLAN NOTE
- BP controlled on admission  - Will hold lisinopril at this time based on increased output  - BP controlled throughout admission. Will continue to monitor

## 2017-08-10 NOTE — DISCHARGE SUMMARY
[facility] Hospital Medicine  Discharge Summary      Patient Name: Ivy Salgado  MRN: [id number]  Admission Date: 8/4/2017  Hospital Length of Stay: 2 days  Discharge Date and Time:  08/10/2017 1:29 PM  Attending Physician: Leigh Wiggins MD   Discharging Provider: Shelby Zarco PA-C  Primary Care Provider: Kalia Astorga MD  Hospital Medicine Team: [facility] HOSP MED F Shelby Zarco PA-C    HPI:   38 y/o F with h/o Crohn's since she was 11yo s/p surgery 5 years ago for colectomy and rectum removed. She was doing well and in remission until April 2017. She follow with Dr. Ross and was considering starting Stelera.  Pt presented to the ED with fevers (last night 100.7°F),  SOB, nausea without vomiting, headache, lightheadedness, 7/10 abdominal pain that is not relieved with at-home Percocet.   Pt reports increased output in her ostomy that is very watery.  This is cause severe irritation of her stoma and the skin around her stoma. She has noticed some BRB in her ostomy bag but denies any melena.  She was treated for ESBL UTI with Macrobid, ciprofloxacin, then Augmentin (last one week ago).  She reports some continuing dysuria and lower back pain.  She is receiving IV fluids weekly for dehydration and received 2 L on 8/1/17.    In the ED, CBC with no leukocytosis with WBC 7.61. CMP with no acute abnormalities. CRP WNL at 0.9. Lipase WNL at 36. PT/INR 10.8/1.0. Lactic acid WNL at 1.2. UA unremarkable. ESR 23 and blood culture x 2 pending.  GI consulted.      Procedure(s) (LRB):  ILEOSCOPY (N/A)      Indwelling Lines/Drains at time of discharge:   Lines/Drains/Airways     Drain                 Ileostomy RUQ 21968 days              Hospital Course:   Pt admitted to observation for fever, increased abdominal pain and output from ostomy.   CBC with no leukocytosis with WBC 7.61. CMP with no acute abnormalities. CRP WNL at 0.9. Lipase WNL at 36. PT/INR 10.8/1.0. Lactic acid WNL at 1.2. UA  unremarkable. ESR 23 and blood culture x 2 NGTD.  GI consulted.  WC consulted.  GI recommended checking CDiff (negative), stool culture (prelim no growth), fecal lactoferrin (in process).  Restarted celexa, pt declined psych consult.  Dr. Ross to assess on Monday.  8/7 pt to start 1/2 prep and NPO after midnight for ileoscopy on 8/8.  Pt c/o dysuria and third UA ordered although second UA clear.  8/8: Ileoscopy today; findings show widely patent end ileostomy found with healthy appearing mucosa; no other significant abnormalities were identified. Full liquid diet; advance as tolerated. Weaning opioids as tolerated.   8/9: Limited PO intake. Weaning opioids. Added Elavil qhs.  8/10: Biopsy results show unremarkable small bowel mucosa; no evidence of active colitis, granuloma, dysplasia or malignancy. Pt discharged home on outpatient regimen and Elavil. Pt to follow up with PCP and GI as an outpatient.      Consults:   Consults         Status Ordering Provider     Inpatient consult to Gastroenterology  Once     Provider:  (Not yet assigned)    RACH Metzger          Significant Diagnostic Studies: Endoscopy: Ileoscopy     Pending Diagnostic Studies:     None        Final Active Diagnoses:    Diagnosis Date Noted POA    PRINCIPAL PROBLEM:  Abdominal pain [R10.9] 10/12/2016 Yes    Crohn's disease [K50.90] 09/18/2013 Yes     Chronic    S/P ileostomy [Z93.2] 07/09/2012 Not Applicable    Essential hypertension, benign [I10] 03/17/2016 Yes     Chronic    Depression [F32.9] 08/05/2017 Unknown    Generalized anxiety disorder [F41.1] 09/18/2013 Yes      Problems Resolved During this Admission:    Diagnosis Date Noted Date Resolved POA      * Abdominal pain    - Differential includes active Crohn's disease, obstruction.  - Pt afebrile and without leukocytosis. CMP with no acute abnormalities. CRP WNL at 0.9. Lipase and lactic acid WNL. UA unremarkable. ESR 23 and blood culture x 2 with NGTD.  - GI  consulted: check c.diff (neg), stool culture (prelim no growth), fecal lactoferrin (in process); UA fairly unremarkable (will repeat); would give patient a full liquid diet and advance as tolerated   - Supportive care with pain medication (attempting to limit) and IVF.  8/7: Dr. Ross would like to proceed with ileoscopy on 8/8.  GI will place 1/2 prep orders.  NPO after midnight.  8/8: Ileoscopy today; findings show widely patent end ileostomy found with healthy appearing mucosa; no other significant abnormalities were identified. Full liquid diet; advance as tolerated. Weaning opioids as tolerated. Blood culture NGTD.  8/9: Limited PO intake. Weaning opioids. Added Elavil qhs. Blood cultures NGTD.  8/10: Biopsy results show unremarkable small bowel mucosa; no evidence of active colitis, granuloma, dysplasia or malignancy. Pt discharged home on outpatient regimen and Elavil. Pt to follow up with PCP and GI as an outpatient.         Crohn's disease    - Plan per GI was to start stelara.  Pt received loading dose on 7/11/2017.  - GI consulted as above.  Dr. Ross recommendations as above.          S/P ileostomy    - Increased watery output per patient resulting in irritation around stoma site  - Pt agreeable to WC consult  - Will continue to monitor  - Strict I/Os          Essential hypertension, benign    - BP controlled on admission  - Will hold lisinopril at this time based on increased output  - BP controlled throughout admission. Will continue to monitor          Depression    - Pt tearful first few days, improvement noted 8/8  - Restarted celexa however pt declined psych consult (last eval ~1 year ago)  - Will continue to monitor.  No SI/HI/hallucinations.          Generalized anxiety disorder    - Will continue xanax              Discharged Condition: stable    Disposition: Home or Self Care    Follow Up:  Follow-up Information     Kalia Astorga MD In 2 weeks.    Specialty:  Internal Medicine  Why:   hospital follow up  Contact information:  [address]  [facility] [address]  [phone]             Parish Ross MD In 2 weeks.    Specialty:  Gastroenterology  Why:  hospital follow up  Contact information:  [address]  [facility] [address]  [phone]                 Patient Instructions:     Ambulatory referral to Outpatient Case Management   Referral Priority: Routine Referral Type: Consultation   Referral Reason: Specialty Services Required    Number of Visits Requested: 1      Diet general     Activity as tolerated     Call MD for:  temperature >100.4     Call MD for:  persistent nausea and vomiting or diarrhea     Call MD for:  severe uncontrolled pain     Call MD for:  redness, tenderness, or signs of infection (pain, swelling, redness, odor or green/yellow discharge around incision site)       Medications:  Reconciled Home Medications:   Current Discharge Medication List      START taking these medications    Details   amitriptyline (ELAVIL) 10 MG tablet Take 1 tablet (10 mg total) by mouth every evening.  Qty: 30 tablet, Refills: 0         CONTINUE these medications which have NOT CHANGED    Details   ustekinumab (STELARA) 130 mg/26 mL injection Inject into the skin.      alprazolam (XANAX) 0.5 MG tablet TAKE 1 TABLET (0.5 MG TOTAL) BY MOUTH 2 (TWO) TIMES DAILY.  Qty: 60 tablet, Refills: 0    Comments: Not to exceed 5 additional fills before 12/20/2017      amoxicillin-clavulanate 875-125mg (AUGMENTIN) 875-125 mg per tablet Take 1 tablet by mouth every 12 (twelve) hours.  Qty: 20 tablet, Refills: 0      certolizumab pegol (CIMZIA) 400 mg/2 mL (200 mg/mL x 2) SyKt kit Inject 400 mg (2 mLs total) into the skin every 28 days.  Qty: 5 each, Refills: 3    Comments: bs      diphenoxylate-atropine 2.5-0.025 mg (LOMOTIL) 2.5-0.025 mg per tablet Take 1 tablet by mouth 3 (three) times daily as needed for Diarrhea.  Qty: 90 tablet, Refills: 3      lisinopril 10 MG tablet TAKE 1 TABLET (10 MG  TOTAL) BY MOUTH ONCE DAILY.  Qty: 90 tablet, Refills: 2      norethindrone-ethinyl estradiol (GILDESS FE 1/20, 28,) 1 mg-20 mcg (21)/75 mg (7) per tablet Take 1 tablet by mouth once daily. Take one active pill daily continuously.  Qty: 28 tablet, Refills: 11      ondansetron (ZOFRAN-ODT) 8 MG TbDL Take 1 tablet (8 mg total) by mouth every 8 (eight) hours as needed (nausea).  Qty: 30 tablet, Refills: 0      oxycodone-acetaminophen (PERCOCET)  mg per tablet Take 1 tablet by mouth every 6 (six) hours as needed for Pain.  Qty: 30 tablet, Refills: 0    Associated Diagnoses: Crohn's disease of small and large intestines with complication      promethazine (PHENERGAN) 25 MG tablet TAKE 1 TABLET BY MOUTH EVERY 6 HOURS AS NEEDED FOR NAUSEA  Qty: 30 tablet, Refills: 3      sumatriptan (IMITREX) 100 MG tablet Take 1 tablet (100 mg total) by mouth every 2 (two) hours as needed for Migraine (do not exceed 2 doses in 24 hours).  Qty: 9 tablet, Refills: 1      valacyclovir (VALTREX) 500 MG tablet Take 1 tablet (500 mg total) by mouth once daily.  Qty: 30 tablet, Refills: 3      zolpidem (AMBIEN) 10 mg Tab TAKE 1 TABLET BY MOUTH AT BEDTIME  Qty: 30 tablet, Refills: 0    Comments: Not to exceed 5 additional fills before 12/20/2017         STOP taking these medications       fluconazole (DIFLUCAN) 100 MG tablet Comments:   Reason for Stopping:             Time spent on the discharge of patient: 30 minutes    HOS POC IP DISCHARGE SUMMARY    Shelby Zarco PA-C  Department of Hospital Medicine  [facility]

## 2017-08-14 ENCOUNTER — PATIENT MESSAGE (OUTPATIENT)
Dept: GASTROENTEROLOGY | Facility: CLINIC | Age: 37
End: 2017-08-14

## 2017-08-14 ENCOUNTER — PATIENT MESSAGE (OUTPATIENT)
Dept: INTERNAL MEDICINE | Facility: CLINIC | Age: 37
End: 2017-08-14

## 2017-08-15 ENCOUNTER — OUTPATIENT CASE MANAGEMENT (OUTPATIENT)
Dept: ADMINISTRATIVE | Facility: OTHER | Age: 37
End: 2017-08-15

## 2017-08-15 NOTE — PROGRESS NOTES
Thank you for the referral.   For your information:    The following patient has been assigned to Aleah Paredes RN with Outpatient Complex Care Management for high risk screening.    Reason: Crohn's disease of small intestine with other complication    Please contact [facility] at [phone] with any questions.    Thank you,  Candice Victoria

## 2017-08-22 ENCOUNTER — OFFICE VISIT (OUTPATIENT)
Dept: INTERNAL MEDICINE | Facility: CLINIC | Age: 37
End: 2017-08-22
Payer: COMMERCIAL

## 2017-08-22 ENCOUNTER — PATIENT MESSAGE (OUTPATIENT)
Dept: INTERNAL MEDICINE | Facility: CLINIC | Age: 37
End: 2017-08-22

## 2017-08-22 VITALS
WEIGHT: 94.13 LBS | HEART RATE: 71 BPM | HEIGHT: 61 IN | BODY MASS INDEX: 17.77 KG/M2 | OXYGEN SATURATION: 96 % | DIASTOLIC BLOOD PRESSURE: 78 MMHG | SYSTOLIC BLOOD PRESSURE: 110 MMHG

## 2017-08-22 DIAGNOSIS — N39.0 RECURRENT UTI: Primary | ICD-10-CM

## 2017-08-22 LAB
BACTERIA #/AREA URNS HPF: ABNORMAL /HPF
BILIRUB UR QL STRIP: ABNORMAL
CAOX CRY URNS QL MICRO: 1
CLARITY UR: ABNORMAL
COLOR UR: YELLOW
GLUCOSE UR QL STRIP: NEGATIVE
HGB UR QL STRIP: ABNORMAL
KETONES UR QL STRIP: NEGATIVE
LEUKOCYTE ESTERASE UR QL STRIP: ABNORMAL
MICROSCOPIC COMMENT: ABNORMAL
NITRITE UR QL STRIP: NEGATIVE
PH UR STRIP: 6 [PH] (ref 5–8)
PROT UR QL STRIP: NEGATIVE
RBC #/AREA URNS HPF: 8 /HPF (ref 0–4)
SP GR UR STRIP: 1.02 (ref 1–1.03)
SQUAMOUS #/AREA URNS HPF: 10 /HPF
URN SPEC COLLECT METH UR: ABNORMAL
UROBILINOGEN UR STRIP-ACNC: NEGATIVE EU/DL
WBC #/AREA URNS HPF: 15 /HPF (ref 0–5)

## 2017-08-22 PROCEDURE — 3078F DIAST BP <80 MM HG: CPT | Mod: S$GLB,,, | Performed by: INTERNAL MEDICINE

## 2017-08-22 PROCEDURE — 3074F SYST BP LT 130 MM HG: CPT | Mod: S$GLB,,, | Performed by: INTERNAL MEDICINE

## 2017-08-22 PROCEDURE — 87086 URINE CULTURE/COLONY COUNT: CPT

## 2017-08-22 PROCEDURE — 3008F BODY MASS INDEX DOCD: CPT | Mod: S$GLB,,, | Performed by: INTERNAL MEDICINE

## 2017-08-22 PROCEDURE — 99214 OFFICE O/P EST MOD 30 MIN: CPT | Mod: S$GLB,,, | Performed by: INTERNAL MEDICINE

## 2017-08-22 PROCEDURE — 99999 PR PBB SHADOW E&M-EST. PATIENT-LVL III: CPT | Mod: PBBFAC,,, | Performed by: INTERNAL MEDICINE

## 2017-08-22 PROCEDURE — 81000 URINALYSIS NONAUTO W/SCOPE: CPT

## 2017-08-22 NOTE — PROGRESS NOTES
Subjective:       Patient ID: Ivy Salgado is a 37 y.o. female.    Chief Complaint: Hospital Follow Up; Nausea; and Urinary Tract Infection    Pt here for f/u from hospital where she was seen for crohn's exacerbation. Doing better and has appropriate GI f/u. Still with chronic nausea and decreased appetite. Weight is fairly stable at 94lbs.     She c/o 2 days of dysuria with urgency and L flank pain. Subjective fever. No vomiting. No hematuria. She has had 2 culture-proven UTIs in last 3 months. Both e. Coli but last was resistant to fluoroquinolones so was tx with augmentin. She had completed this but caused a lot of GI upset.       Review of Systems   Respiratory: Negative for shortness of breath.    Cardiovascular: Negative for chest pain.   Gastrointestinal: Positive for nausea. Negative for abdominal pain and vomiting.   Genitourinary: Positive for dysuria, flank pain and frequency.   Psychiatric/Behavioral: Negative for dysphoric mood.       Objective:      Physical Exam   Constitutional: She appears well-developed and well-nourished.   Cardiovascular: Normal rate, regular rhythm and normal heart sounds.    Pulmonary/Chest: Effort normal and breath sounds normal.   Abdominal: Soft. Bowel sounds are normal. She exhibits no distension and no mass. There is no tenderness. There is CVA tenderness. There is no guarding.   Ostomy intact; L flank tenderness       Assessment:       1. Recurrent UTI        Plan:       1. UA and culture  2. Will await UA results but if evidence of UTI then will tx with doxy  3. Urology referral due to recurrent UTIs; need to r/o fistula considering complicated crohn's  4. ED prompts d/w pt and she understood

## 2017-08-23 ENCOUNTER — OUTPATIENT CASE MANAGEMENT (OUTPATIENT)
Dept: ADMINISTRATIVE | Facility: OTHER | Age: 37
End: 2017-08-23

## 2017-08-23 LAB — BACTERIA UR CULT: NO GROWTH

## 2017-08-23 RX ORDER — ALPRAZOLAM 0.5 MG/1
0.5 TABLET ORAL 2 TIMES DAILY
Qty: 60 TABLET | Refills: 0 | Status: CANCELLED | OUTPATIENT
Start: 2017-08-23

## 2017-08-23 RX ORDER — ZOLPIDEM TARTRATE 10 MG/1
10 TABLET ORAL NIGHTLY
Qty: 30 TABLET | Refills: 0 | Status: CANCELLED | OUTPATIENT
Start: 2017-08-23

## 2017-08-23 NOTE — PROGRESS NOTES
08/23/2017  (1st attempt)  RN-CM attempted to contact patient to screen for OPCM. Left message requesting return call. Will attempt to contact patient at a later date.

## 2017-08-24 ENCOUNTER — PATIENT MESSAGE (OUTPATIENT)
Dept: GASTROENTEROLOGY | Facility: CLINIC | Age: 37
End: 2017-08-24

## 2017-08-24 DIAGNOSIS — K50.819 CROHN'S DISEASE OF SMALL AND LARGE INTESTINES WITH COMPLICATION: ICD-10-CM

## 2017-08-24 RX ORDER — OXYCODONE AND ACETAMINOPHEN 10; 325 MG/1; MG/1
1 TABLET ORAL EVERY 6 HOURS PRN
Qty: 40 TABLET | Refills: 0 | Status: SHIPPED | OUTPATIENT
Start: 2017-08-24 | End: 2017-09-21 | Stop reason: SDUPTHER

## 2017-08-24 RX ORDER — ALPRAZOLAM 0.5 MG/1
0.5 TABLET ORAL 2 TIMES DAILY
Qty: 60 TABLET | Refills: 0 | Status: SHIPPED | OUTPATIENT
Start: 2017-08-24 | End: 2017-09-20 | Stop reason: SDUPTHER

## 2017-08-24 RX ORDER — ZOLPIDEM TARTRATE 10 MG/1
TABLET ORAL
Qty: 30 TABLET | Refills: 0 | Status: SHIPPED | OUTPATIENT
Start: 2017-08-24 | End: 2017-09-20 | Stop reason: SDUPTHER

## 2017-08-25 ENCOUNTER — OUTPATIENT CASE MANAGEMENT (OUTPATIENT)
Dept: ADMINISTRATIVE | Facility: OTHER | Age: 37
End: 2017-08-25

## 2017-08-25 NOTE — LETTER
August 25, 2017    Ivy Salgado  [address] [place] [address]             [facility] [address] [place] [address] Dear Ms. Salgado,     I work with [facility] Outpatient Case Management Department. We received a referral to call you to discuss your medical history to see how we can assist you with your care. These services are free of charge and are offered to [facility] patients who have recently been discharged from any of our facilities or who have complex medical conditions that may require the skill of a nurse to assist with management.    I am a Registered Nurse who specializes in connecting patients with available medical and financial resources as well as addressing any educational needs that may be indicated. We also have a  that works closely with us that can assist with future planning needs.     I attempted to reach you by telephone, but I was unsuccessful. I received your  message yesterday but was unable to reach you when I returned you call. Please call our department so that we can go over some questions with you regarding your health.    I can be reached at [phone] from 8:00am to 4:30pm, Monday through Friday. [facility] also has a program where a nurse is available 24/7 to answer questions or provide medical advice. Their number is [phone].    Kind Regards,        Aleah Parsons, RN  Outpatient Complex Care Management

## 2017-08-25 NOTE — PROGRESS NOTES
08/25/2017  (2nd attempt)  Patient left voice mail message returning my call.  RN-CM attempted to contact patient to screen for OPCM. Left message requesting return call. Letter sent. Will attempt to contact patient at a later date.

## 2017-08-28 ENCOUNTER — OUTPATIENT CASE MANAGEMENT (OUTPATIENT)
Dept: ADMINISTRATIVE | Facility: OTHER | Age: 37
End: 2017-08-28

## 2017-08-28 ENCOUNTER — INFUSION (OUTPATIENT)
Dept: INFECTIOUS DISEASES | Facility: HOSPITAL | Age: 37
End: 2017-08-28
Attending: INTERNAL MEDICINE
Payer: COMMERCIAL

## 2017-08-28 ENCOUNTER — PATIENT MESSAGE (OUTPATIENT)
Dept: INTERNAL MEDICINE | Facility: CLINIC | Age: 37
End: 2017-08-28

## 2017-08-28 ENCOUNTER — PATIENT MESSAGE (OUTPATIENT)
Dept: GASTROENTEROLOGY | Facility: CLINIC | Age: 37
End: 2017-08-28

## 2017-08-28 VITALS
SYSTOLIC BLOOD PRESSURE: 131 MMHG | HEIGHT: 61 IN | WEIGHT: 94.13 LBS | TEMPERATURE: 99 F | DIASTOLIC BLOOD PRESSURE: 80 MMHG | BODY MASS INDEX: 17.77 KG/M2 | HEART RATE: 91 BPM | OXYGEN SATURATION: 96 % | RESPIRATION RATE: 19 BRPM

## 2017-08-28 DIAGNOSIS — K50.819 CROHN'S DISEASE OF SMALL AND LARGE INTESTINES WITH COMPLICATION: Primary | ICD-10-CM

## 2017-08-28 PROCEDURE — 25000003 PHARM REV CODE 250: Performed by: INTERNAL MEDICINE

## 2017-08-28 PROCEDURE — 96361 HYDRATE IV INFUSION ADD-ON: CPT

## 2017-08-28 PROCEDURE — 96360 HYDRATION IV INFUSION INIT: CPT

## 2017-08-28 RX ORDER — ACETAMINOPHEN 325 MG/1
650 TABLET ORAL
Status: CANCELLED | OUTPATIENT
Start: 2017-08-28

## 2017-08-28 RX ORDER — DIPHENHYDRAMINE HYDROCHLORIDE 50 MG/ML
25 INJECTION INTRAMUSCULAR; INTRAVENOUS
Status: CANCELLED | OUTPATIENT
Start: 2017-08-28

## 2017-08-28 RX ORDER — EPINEPHRINE 1 MG/ML
0.3 INJECTION, SOLUTION, CONCENTRATE INTRAVENOUS
Status: CANCELLED | OUTPATIENT
Start: 2017-08-28

## 2017-08-28 RX ORDER — IPRATROPIUM BROMIDE AND ALBUTEROL SULFATE 2.5; .5 MG/3ML; MG/3ML
3 SOLUTION RESPIRATORY (INHALATION)
Status: CANCELLED | OUTPATIENT
Start: 2017-08-28

## 2017-08-28 RX ADMIN — SODIUM CHLORIDE 2000 ML: 0.9 INJECTION, SOLUTION INTRAVENOUS at 11:08

## 2017-08-28 NOTE — PROGRESS NOTES
08/28/2017  RNNINA attempted to return patient's call. She left a message on my voice mail returning my previous call. Left message on patient's voice mail. Will attempt to reach patient at a later date.

## 2017-08-29 ENCOUNTER — PATIENT MESSAGE (OUTPATIENT)
Dept: GASTROENTEROLOGY | Facility: CLINIC | Age: 37
End: 2017-08-29

## 2017-08-29 ENCOUNTER — TELEPHONE (OUTPATIENT)
Dept: PHARMACY | Facility: HOSPITAL | Age: 37
End: 2017-08-29

## 2017-08-30 ENCOUNTER — TELEPHONE (OUTPATIENT)
Dept: PHARMACY | Facility: HOSPITAL | Age: 37
End: 2017-08-30

## 2017-08-30 ENCOUNTER — OUTPATIENT CASE MANAGEMENT (OUTPATIENT)
Dept: ADMINISTRATIVE | Facility: OTHER | Age: 37
End: 2017-08-30

## 2017-08-30 ENCOUNTER — TELEPHONE (OUTPATIENT)
Dept: INTERNAL MEDICINE | Facility: CLINIC | Age: 37
End: 2017-08-30

## 2017-08-30 DIAGNOSIS — K50.919 CROHN'S DISEASE WITH COMPLICATION, UNSPECIFIED GASTROINTESTINAL TRACT LOCATION: Primary | ICD-10-CM

## 2017-08-30 NOTE — PROGRESS NOTES
08/30/2017  RN-BRAN contacted patient to screen for OPCM. Assessment completed and patient agreed to enroll in the program. Patient is a 37 year old female with a prior medical history of Crohn's Disease, Rectal Bleeding, Abdominal Pain, Nausea, SBO due to Adhesions, Adnexal Adhesions, Adhesions of Uterus, Ovarian Cyst, S/P Hysterectomy with Oophorotomy, C.Diff Colitis, Joint Pain, Joint Swelling, S/P ileostomy and Fatigue. 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 Will mail patient information on Crohn's Disease and Lifestyle Management of Crohn's Disease and review at next call. Provided contact information for [employer] [phone] www.ccfa.org and the National Digestive Diseases Information Clearinghouse (NDDIC) [phone]. Patient reports that she lives in an apartment with her young daughter and is having trouble affording rent, food, utilities, medication and medical care while awaiting approval for disability. Will send referral to OPCM-LCSW for any financial assistance that may be available to her. Patient scored a 12 on the PHQ9 and states that she takes medication for depression which she feels is related entirely to her worsening health conditions. She reports that she also goes to talk therapy. Will mail patient information about the signs/symptoms of worsening depression and will review at next call. She denies SI or HI at this time and feels that her current medication and treatment regimen for Depression/Anxiety is adequate. Patient states that she checks her blood pressure 1-2 times per week and that it has been controlled for some time with medication. Will send patient information on High Blood Pressure and review at next call. I informed patient that she is eligible for the Digital Hypertension Program, but she states that she is not interested  at this time. Patient states that she has lost about 10 pounds over the last 6 weeks due to Crohn's pain and flare up and has no appetite. She states that she can only tolerate a very bland diet. Will send information to patient on [place] and Soft Diets, as well as request Dietician referral from PCP. Encouraged patient to follow medication and treatment regimen. Reviewed patient's upcoming scheduled appointments and encouraged patient to maintain follow up with doctors.     Follow Up Plan:  - Assess for receipt of mailed educational literature.  - Continue to educate about s/s of worsening depression. Review literature at next call.  - Continue to educate about Crohn's Disease and s/s of Flare-up. Review literature at next call.  - Insure patient has been in contact with .  - Follow up with any applications that patient needs to complete for assistance.  - Assess for any additional needs.

## 2017-08-30 NOTE — PATIENT INSTRUCTIONS
"  Crohns Disease    Crohns disease is inflammation of the intestinal tract that comes and goes in flare-ups. This is a chronic (long-term) illness. During a flare-up, intense abdominal pain and fever may be felt. Mucus, blood, or pus may appear in the stool. Between flare ups, inflammation lessens and there usually are no symptoms. Crohns disease is a form of inflammatory bowel disease (IBD).   Symptoms of Crohn's disease may include:  · Abdominal cramps and pain  · Diarrhea, usually bloody, alternating with constipation  · Mucus in stools  · Rectal bleeding  · Rectal pain  · Fever  · Low energy  · Decreased appetite and weight loss  No one knows what exactly causes Crohn's disease, and there is no cure. The goal of treatment is to control and relieve symptoms and prevent complications, so you can lead a full and active life. No single treatment works for everyone, but many things can be done to help.  Diet  Foods did not cause your Crohn's, but they can affect it. Unfortunately, there is no one diet that works for everyone. Below are some things to try. Keep a food log to figure out what you are sensitive to.  · Eat more slowly and smaller amounts at a time, but more often. Remember, you can always eat more, but cannot eat less once you've eaten too much.  · High fiber foods are complicated. While they may help constipation they can make the bloating, cramping, gas, and diarrhea worse.  · Try avoiding dairy products, sometimes this helps  · Try cutting out foods that are high in fat and fatty meats  · Eat less sugar  · Bloating or passing excess gas may be controlled. Be careful with "gassy" vegetables and fruits like beans, cabbage, broccoli, and cauliflower.  · Be careful of carbonated beverages and fruit juices. They can make the bloating and diarrhea worse.  · Caffeine, alcohol, and stimulants may worsen symptoms. These include coffee, tea, sodas, energy drinks, and chocolate.  Lifestyle  Although stress " does not cause Crohn's, it is often a factor in flare-ups. It can also affect how you feel about and cope with your condition.  · Look for factors that seem to worsen your symptoms such as stress and emotions.  · Counseling can often help relieve stress. So can self-help measures such as exercise, yoga, and meditation.  · Depression can be a part of this illness and antidepressants may be prescribed. This may actually help with diarrhea, constipation, and cramping, as well as depression.  · Smoking doesn't cause Crohn's, but can make the symptoms worse.  Medicines  Your healthcare provider may prescribe medicines. If so, take them as directed. For acute flare-ups, prescription medicines can be prescribed. Contact your provider if you need this.  · Ask your healthcare provider before taking any antidiarrheal medicines.  · Avoid anti-inflammatory medicines like ibuprofen or naproxen.  · Consider nutritional supplements. This is especially true if the diarrhea is prolonged, or you aren't eating or are losing weight.  Follow-up care  Follow up with your healthcare provider, or as advised. If a stool sample was taken or cultures were done, you will be told if your treatment needs to change. Call as directed for the results.  Support  Support groups for persons Crohns disease can be a source of useful information on how others are coping with this illness. They are available in person, on the phone, or via the Internet. Contact the following resources for more information.  · Crohns [facility] [employer]itis [employer] [place] 657.633.3196 www.ccfa.org  · National Digestive Diseases Information Clearinghouse (NDDIC) 427.247.7917 www.digestive.niddk.nih.gov  When to seek medical advice  Call your healthcare provider right away if any of these occur:  · Fever of 100.4ºF (38ºC) or higher, or as directed by your healthcare provider  · Abdominal pain that doesn't get better when you take the usual measures  · Mucus, pus, or blood  in the stool (dark or bright red)  · Repeated vomiting  · Abdominal swelling and pain that doesn't go away after a few hours  Call 911  Call 911 if any of these occur:  · Trouble breathing  · Confusion  · Extreme sleepiness or trouble waking up  · Fainting or loss of consciousness  · Rapid heart rate  Date Last Reviewed: 8/31/2015 © 2000-2016 [employer]. [address] [place] [address], [place] [address]. All rights reserved. This information is not intended as a substitute for professional medical care. Always follow your healthcare professional's instructions.        Lifestyle Management of Crohns Disease    Crohns disease is a type of inflammatory bowel disease (IBD). You can lead a full life even if you have Crohns disease. Focus on keeping your symptoms under control. Do not let this disease isolate you. By planning ahead and working with support groups, you can find ways to cope. You may even help others who have Crohns disease.  Have a plan  Make this your goal: Crohns disease wont keep me from the activities I enjoy. You may need to do some planning to reach that goal. But by staying positive, you can help make sure youre in control--not the disease. Here are some other tips:  · Know where to find clean bathrooms.  · Eat more small meals instead of 3 big meals, especially when you wont have easy access to bathrooms.  · If youve had a recent flare-up, eat foods that you know will limit your symptoms.   · Get some exercise every day.  · Take a stress reduction class.  If going on a long trip, discuss your plans with your healthcare provider. He or she can teach you what to do if you have a flare-up while on the road.  Find a support group  Crohns disease support groups can help you with many concerns you may have. Other people have felt much of what you may be feeling. Just knowing that youre not alone can be a great comfort. Someone in a support group may offer a travel tip or a coping  skill thats perfect for you, and dont forget how satisfying it can feel to help another Crohns disease patient who is in need.  Contact the Crohns & Colitis Foundation [facility] [place] at 915-077-5315 for more information.  Managing nutrition  You may be able to eat most foods until you have a flare-up. But like anyone else, you need to make healthy eating choices. Some of the healthiest foods can make your symptoms worse, though. Keeping track of your problem foods may be helpful. Ask your healthcare provider any questions you have about healthy eating.  Theres no rule for which foods can be a problem. How you feel after eating them is the best guide. You may need to avoid high-fiber foods and foods that are hard to digest. These can include fresh fruits and vegetables. High-fat foods, such as whole milk dairy products and red meat can also worsen symptoms in a flare-up. Write down what you eat and how it affects you. If one kind of food often gives you trouble, stay away from it. Also note the foods that work well for you. Your healthcare provider may have you see a registered dietitian to come up with the best food choices for you. A registered dietitian can help make sure that you eat foods that are safe for you while getting proper nourishment.  No two people respond the same to all foods. But these choices are often safe to eat during a flare-up:  · Tuna packed in water  · Skinless chicken  · White rice  · Mashed potatoes  · Plain pasta  · Instant oatmeal  · [place] toast · Applesauce  · Flavored gelatin  · Vanilla pudding  · Custard  · Baked potatoes (dont eat the skin)  · Canned peaches or pears   Special issues  In rare cases, the small intestine cant absorb nutrients. Total parenteral nutrition (TPN) is a treatment that provides nourishment through an IV tube. This lets you get nutrition without eating, giving your digestive tract time to rest. TPN also may be used to help prepare for surgery, if needed.  TPN can be done either in the hospital or at home with the help of a home health nurse.   Date Last Reviewed: 8/1/2016 © 2000-2016 [employer] [address] [place] [address], [place] [address]. All rights reserved. This information is not intended as a substitute for professional medical care. Always follow your healthcare professional's instructions.        Managing Crohns Disease: Medicines  Your healthcare provider may prescribe medicine to help control your Crohns disease. Medicine can help lessen symptoms. It wont cure Crohns disease, but it can help improve your quality of life. Work closely with your healthcare provider. You may have certain side effects or your symptoms may change. In this case, your medicine or dosage may need to be changed.  Types of medicines  You may be prescribed any of these types of medicines:  · Corticosteroids  · Immunomodulators  · Biologic agents  · Antibiotics  · Aminosalicylates  You can learn more about each kind below.  Corticosteroids  Your healthcare provider may advise you to take corticosteroids. These help to calm inflammation in your body. This can make your symptoms better quickly. You may take corticosteroids as a pill or liquid by mouth. In some cases, they may be given through an IV. Or they may be given rectally as either a suppository or an enema. You take them for a short time, usually not longer than 8 to 12 weeks. You do not take them when you are in remission. Remission is a long period with no symptoms.  If used for a long time, side effects may include:  · Mood changes  · Trouble sleeping  · Changes in body shape  · Puffy face or acne  · Weight gain  · Stretch marks  · Eye problems  · Bone loss or breaks  · Facial hair in women  · High blood pressure  · Risk of diabetes  Immunomodulators  These kinds of medicines cause your bodys immune system to be less active. This can help to reduce inflammation and calm your symptoms. They are taken as a pill  by mouth. You may not feel their effects until you have taken them for a few months. But you can take them for a long time. You will need to have blood tests every few months to check your liver and blood cell counts.  Side effects may include:  · Nausea  · Body aches  · Inflammation of the pancreas  · Low white blood cell count  · Liver problems  · Low folic acid levels  · Infection  · Lymphoma  · Non-melanoma skin cancer  Biologic agents  These kinds of medicines help stop body chemicals that cause inflammation. One medicine blocks a chemical called tumor necrosis factor (TNF). The medicine is also known as anti-TNF monoclonal antibodies. Another type of medicine blocks white blood cells from getting into the intestinal tissue and causing inflammation. New biologics are also being created that target different ways the intestine gets inflamed in Crohn's disease. These medicines may be given different ways. They may be given by vein (IV) every 2 to 8 weeks. They may be given with a shot (injection) once a week or once a month. These medicines can put you at risk for infections. Tell your healthcare provider if you have a chronic infection. You will need to be tested for tuberculosis and hepatitis B infection before taking the medicine.  Side effects may include:  · Flushing, chest pain, shortness of breath, hives, or a drop in blood pressure during IV treatment  · Joint and muscle aches  · Rash  · Fever  · Infection  · Renewal of a previous tuberculosis or hepatitis B infection  · Lymphoma  · Skin cancers  · Liver toxicity  · TNF-induced psoriasis  Antibiotics  These may be used if you also have an infection due to Crohns disease, such as an abscess. Antibiotics may be given as a pill taken by mouth. You should stay out of the sun while taking them. You should also not drink alcohol while taking them. Antibiotics may cause severe reactions. These can include nausea, vomiting, and breathing problems. Tell  your healthcare provider right away if you have numbness or tingling in your hands. Also tell your healthcare provider if your bowel symptoms become worse.  Side effects may include:  · Nausea  · Diarrhea  · Headaches  · Dizziness  · Dark urine  · Loss of appetite  · Metallic taste in the mouth  · Sensitivity to the sun  Aminosalicylates  This medicine is also known as 5-ASA. They may be used for mild to moderate symptoms. The medicine may be taken either orally or by rectum. They work to decrease inflammation in the intestines.  Side effects may include:  · Headache  · Nausea  · Abdominal pain  · Diarrhea  Managing side effects  Your healthcare provider will explain the side effects of any new medicines. In most cases, side effects are easy to manage. But sometimes they can be so severe that you need to change medicine. Call your healthcare provider if you have any of the below:  · Side effects that are hard to manage  · Severe side effects  · Unexpected side effects   Date Last Reviewed: 11/1/2016  © 2906-4840 [employer] [address] [place] [address]. All rights reserved. This information is not intended as a substitute for professional medical care. Always follow your healthcare professional's instructions.        What Is Crohns Disease?    Crohns disease causes swelling, inflammation, and irritation. This leads to ulceration of the digestive tract. It is a form of inflammatory bowel disease (IBD) and often affects the small intestine and the colon. All layers of the lining of the digestive tract may be affected. While this disease has no cure, the symptoms can be treated. Help manage your symptoms by following your healthcare providers advice and avoiding foods that cause irritation.  Symptoms of Crohns disease  · Belly pain and cramping  · Urgent need to move bowels or sensation of incomplete evacuation  · Constipation  · High fever and chills  · Loss of appetite; possible weight  loss  · Bloody or persistent diarrhea  · Nausea or vomiting  · Joint pain  · Skin rashes and ulceration  · Eye inflammation  Your treatment choices  Your healthcare provider will discuss your treatment choices with you. They may include medicines, lifestyle changes, surgery, or a combination of these. Treatment helps you stay as active as you want to be. Keep in mind that Crohns is considered chronic. That means it usually cant be cured. But treatment may ease symptoms. And even though you have a chronic illness, you can still live a full life.  Medicines  Certain medicines can help your symptoms. These may include:  · Medicines to control your body's immune system, such as 6-mercaptopurine or azathioprine  · Corticosteroids (for short-term, but not maintenance treatment) to help reduce inflammation  · Antibiotics to fight bacteria, if there are infectious complications  · Biologics (anti-TNF)  Lifestyle changes     Crohns disease can affect all the layers of the digestive tract.     This includes:  · Certain foods can worsen symptoms. You may need to change what you eat. Avoid any food that makes your symptoms worse. These foods vary from person to person. But high-fiber foods (such as fresh vegetables) and high-fat foods (such as dairy products and red meat) cause symptoms in many people. Keep track of foods that cause you problems.  · To a lesser degree, stress may possibly worsen symptoms. Reducing stress may help. Techniques like relaxation exercises, medicine, and deep breathing can help you control stress. Your healthcare provider may be able to tell you more about these.  If surgery is needed  Surgery may help control Crohn's, relieving digestive tract symptoms. Surgery can remove a severely affected part of the digestive tract. If this is a choice for you, your healthcare provider can give you more information. Keep in mind that surgery is not a cure for Crohn's. You will need to continue to closely  follow up with your healthcare provider after surgery for more treatment and testing recommendations   Date Last Reviewed: 7/1/2016  © 0822-2784 [employer]. [address] [place] [address], [place] [address]. All rights reserved. This information is not intended as a substitute for professional medical care. Always follow your healthcare professional's instructions.        What Can Cause Depression?  Certain factors have been known to trigger depression. Below are some common known causes. Any of these factors, or a combination of them, can make depression more likely. Sometimes, depression occurs for no one clear reason. But no matter what the cause, depression can be treated.    Loss or stress  Normal grief over a death, breakup, or other loss may become depression. Life stresses such as physical abuse, job loss, or sudden change in finances can also trigger depression. In some cases, years go by before the depression sets in.  Family history  The tendency to develop depression seems to run in families. If one or more of your close relatives (parents, grandparents, or siblings) have had an episode of depression, you may be more likely to develop the illness, too.  Drugs or alcohol  Drugs and alcohol can upset the chemical balance in the brain. This can lead to an episode of depression. Some depressed people turn to drugs or alcohol to numb the pain. But in the long run, doing so just makes depression worse.  Medications  Depression can be a side effect of some medications for high blood pressure, cancer, pain, and other health problems. So tell your doctor about all medications you take. But never stop taking one without your doctors okay.  Physical illness  Being sick can make anyone feel frustrated and sad. But some health problems may cause actual changes in your brain that lead to depression. Other health problems, such as an underactive thyroid, may be mistaken for depression.  Hormones  Hormones carry  messages in the bloodstream. They may affect brain chemicals, leading to depression. Women may get depressed when their hormone levels change quickly, such as just before their period, after giving birth, or during menopause.  Date Last Reviewed: 1/19/2015 © 2000-2016 [employer]. [address] [place] [address], [place] [address]. All rights reserved. This information is not intended as a substitute for professional medical care. Always follow your healthcare professional's instructions.        Depression: Tips to Help Yourself  As your health care providers help treat your depression, you can also help yourself. Keep in mind that your illness affects you emotionally, physically, mentally, and socially. So full recovery will take time. Take care of your body and your soul, and be patient with yourself as you get better.    Be with others  Dont isolate yourself--youll only feel worse. Try to be with other people. And take part in fun activities when you can. Go to a movie, [employer]game, Spiritism service, or social event. Talk openly with people you can trust. And accept help when its offered.  Keep your perspective  · Depression can cloud your judgment. So wait until you feel better before making major life decisions, such as changing jobs, moving, or getting  or .  · This illness is not your fault. Dont blame yourself for your depression.  · Recovering from depression is a process. Dont be discouraged if it takes some time to feel better.  · Depression saps your energy and concentration. So you wont be able to do all the things you used to do. Set small goals and do what you can.  Take care of your body  People with depression often lose the desire to take care of themselves. That only makes their problems worse. During treatment and afterward, make a point to:  · Exercise. Its a great way to take care of your body. And studies have shown that exercise helps fight depression.  · Avoid drugs  and alcohol. These may ease the pain in the short term. But theyll only make your problems worse in the long run.  · Get relief from stress. Ask your healthcare provider for relaxation exercises and techniques to help relieve stress.  · Eat right. A balanced and healthy diet helps keep your body healthy.  Date Last Reviewed: 1/19/2015  © 5588-9559 [employer]. [address] [place] [address], [place] [address]. All rights reserved. This information is not intended as a substitute for professional medical care. Always follow your healthcare professional's instructions.        Depression Affects Your Mind and Body  Everyone feels sad or blue from time to time for a few days or weeks. Depression is when these feelings don't go away and they interfere with daily life.  Depression is a real illness. It makes you feel sad and helpless. It gets in the way of your life and relationships. It inhibits your ability to think and act. But, with help, you can feel better again.     When I was depressed, I felt awful. I was so tired all the time I could hardly think, but at night I couldnt fall asleep. My head hurt. My stomach hurt. I didnt know what was wrong with me.   Depression affects your whole body  Brain chemicals affect your body as well as your mood. So depression may do more than just make you feel low. You may also feel bad physically. Depression can:  · Cause trouble with mental tasks such as remembering, concentrating, or making decisions  · Make you feel nervous and jumpy  · Cause trouble sleeping. Or you may sleep too much  · Change your appetite  · Cause headaches, stomachaches, or other aches and pains  · Drain your body of energy  Depression and other illness  It is common for people who have chronic health problems to also have depression. It can often be hard to tell which one caused the other. A person might become depressed after finding out they have a health problem. But some studies suggest  being depressed may make certain health problems more likely. And some depressed people stop taking care of themselves. This may make them more likely to get sick.  Date Last Reviewed: 2015 [employer]. [address] [place] [address], [place] [address]. All rights reserved. This information is not intended as a substitute for professional medical care. Always follow your healthcare professional's instructions.        Know the Signs and Symptoms of Depression  Everyone feels down at times. The blues are a natural part of life. But an unhappy period thats intense or lasts for more than a couple of weeks can be a sign of depression. Depression is a serious illness. It can disrupt the lives of family and friends. If you know someone you think may be depressed, find out what you can do to help.    Recognizing signs of depression  People who are depressed may:  · Feel unhappy, sad, blue, down, or miserable nearly every day  · Feel helpless, hopeless, or worthless  · Lose interest in hobbies, friends, and activities that used to give pleasure  · Not sleep well or sleep too much  · Gain or lose weight  · Feel low on energy or constantly tired  · Have a hard time concentrating or making decisions  · Lose interest in sex  · Have physical symptoms, such as stomachaches, headaches, or backaches  Know the serious signals  Warning signals for suicide include:  · Threats or talk of suicide  · Statements such as I wont be a problem much longer or Nothing matters  · Giving away possessions or making a will or  arrangements  · Buying a gun or other weapon  · Sudden, unexplained cheerfulness or calm after a period of depression  If you notice any of these signs, get help right away. Call a health care professional, mental health clinic, or suicide hotline and ask what action to take. In an emergency, dont hesitate to call the police.  Resources:  · National Institutes of Mental  Zhczli314-113-6461foh.[facility].gov  · National [place] on Mental Cbjzlab250-234-8568twj.beatris.org   · Mental Health Eqcdsnd440-392-3167wzg.[facility].org  · National Suicide Bsxpild570-710-6230 (800-SUICIDE)  · National Suicide Prevention Mbgtviii065-261-3280 (157-986-KPXH)www.[employer]prevention[employer] Metricsline.org   Date Last Reviewed: 1/19/2015  © 2653-1865 [employer]. [address] [place] [address]. All rights reserved. This information is not intended as a substitute for professional medical care. Always follow your healthcare professional's instructions.        Your Bodys Response to Anxiety    Normal anxiety is part of the bodys natural defense system. It's an alert to a threat that is unknown, vague, or comes from your own internal fears. While youre in this state, your feelings can range from a vague sense of worry to physical sensations such as a pounding heartbeat. These feelings make you want to react to the threat. An anxiety response is normal in many situations. But when you have an anxiety disorder, the same response can occur at the wrong times.  Anxiety can be helpful  Normal anxiety is a signal from your brain that warns you of a threat and is a normal response to help you prevent something or decrease the bad effects of something you can't control. For example, anxiety is a normal response to situations that might damage your body, separate you from a loved one, or lose your job. The symptoms of anxiety can be physical and mental.  How does it feel?  At certain times, people with anxiety may have:  · Dizziness  · Muscle tension or pain  · Restlessness  · Sleeplessness  · Difficulty concentrating  · Racing heartbeat  · Fast breathing  · Shaking or trembling  · Stomachache  · Diarrhea  · Loss of energy  · Sweating  · Cold, clammy hands  · Chest pain  · Dry mouth  Anxiety can also be a problem  Anxiety can become a problem when it is difficult to control, occurs for months, and  interferes with important parts of your life. With an anxiety disorder, your body has the response described above, but in inappropriate ways. The response a person has depends on the anxiety disorder he or she has. With some disorders, the anxiety is way out of proportion to the threat that triggers it. With others, anxiety may occur even when there isnt a clear threat or trigger.  Who does it affect?  Some people are more prone to persistent anxiety than others. It tends to run in families, and it affects more younger people than older people. But no age, race, or gender is immune to anxiety problems.  Anxiety can be treated  The good news is that the anxiety thats disrupting your life can be treated. Working with your doctor or other healthcare provider, you can develop skills to help you cope with anxiety. You can also gain the perspective you need to overcome your fears. Note: Good sources of support or guidance can be found at your local hospital, mental health clinic, or an employee assistance program.     If anxiety is wearing you down, here are some things you can do to cope:  · Keep in mind that you cant control everything about a situation. Change what you can and let the rest take its course.  · Exercise--its a great way to relieve tension and help your body feel relaxed.  · Avoid caffeine and nicotine, which can make anxiety symptoms worse.  · Fight the temptation to turn to alcohol or unprescribed drugs for relief. They only make things worse in the long run.   Date Last Reviewed: 1/19/2015  © 0987-3188 [employer]. [address] [place] [address], [place] [address]. All rights reserved. This information is not intended as a substitute for professional medical care. Always follow your healthcare professional's instructions.        Anxiety Reaction  Anxiety is the feeling we all get when we think something bad might happen. It is a normal response to stress and usually causes only a mild  reaction. When anxiety becomes more severe, it can interfere with daily life. In some cases, you may not even be aware of what it is youre anxious about. There may also be a genetic link or it may be a learned behavior in the home.  Both psychological and physical triggers cause stress reaction. It's often a response to fear or emotional stress, real or imagined. This stress may come from home, family, work, or social relationships.  During an anxiety reaction, you may feel:  · Helpless  · Nervous  · Depressed  · Irritable  Your body may show signs of anxiety in many ways. You may experience:  · Dry mouth  · Shakiness  · Dizziness  · Weakness  · Trouble breathing  · Breathing fast (hyperventilating)  · Chest pressure  · Sweating  · Headache  · Nausea  · Diarrhea  · Tiredness  · Inability to sleep  · Sexual problems  Home care  · Try to locate the sources of stress in your life. They may not be obvious. These may include:  ¨ Daily hassles of life (traffic jams, missed appointments, car troubles, etc.)  ¨ Major life changes, both good (new baby, job promotion) and bad (loss of job, loss of loved one)  ¨ Overload: feeling that you have too many responsibilities and can't take care of all of them at once  ¨ Feeling helpless, feeling that your problems are beyond what youre able to solve  · Notice how your body reacts to stress. Learn to listen to your body signals. This will help you take action before the stress becomes severe.  · When you can, do something about the source of your stress. (Avoid hassles, limit the amount of change that happens in your life at one time and take a break when you feel overloaded).  · Unfortunately, many stressful situations can't be avoided. It is necessary to learn how to better manage stress. There are many proven methods that will reduce your anxiety. These include simple things like exercise, good nutrition and adequate rest. Also, there are certain techniques that are  helpful:  ¨ Relaxation  ¨ Breathing exercises  ¨ Visualization  ¨ Biofeedback  ¨ Meditation  For more information about this, consult your doctor or go to a local bookstore and review the many books and tapes available on this subject.  Follow-up care  If you feel that your anxiety is not responding to self-help measures, contact your doctor or make an appointment with a counselor. You may need short-term psychological counseling and temporary medicine to help you manage stress.  Call 911  Call your healthcare provider right away if any of these occur:  · Trouble breathing  · Confusion  · Drowsiness or trouble wakening  · Fainting or loss of consciousness  · Rapid heart rate  · Seizure  · New chest pain that becomes more severe, lasts longer, or spreads into your shoulder, arm, neck, jaw, or back  When to seek medical advice  Call your healthcare provider right away if any of these occur:  · Your symptoms get worse  · Severe headache not relieved by rest and mild pain reliever  Date Last Reviewed: 9/29/2015  © 0891-7219 [employer]. [address] [place] [address]. All rights reserved. This information is not intended as a substitute for professional medical care. Always follow your healthcare professional's instructions.        Treating Anxiety Disorders with Therapy    If you have an anxiety disorder, you dont have to suffer anymore. Treatment is available. Therapy (also called counseling) is often a helpful treatment for anxiety disorders. With therapy, a specially trained professional (therapist) helps you face and learn to manage your anxiety. Therapy can be short-term or long-term depending on your needs. In some cases, medication may also be prescribed with therapy. It may take time before you notice how much therapy is helping, but stick with it. With therapy, you can feel better.  Cognitive behavioral therapy (CBT)  Cognitive behavioral therapy (CBT) teaches you to manage anxiety.  It does this by helping you understand how you think and act when youre anxious. Research has shown CBT to be a very effective treatment for anxiety disorders. How CBT is run is almost like a class. It involves homework and activities to build skills that teach you to cope with anxiety step by step. It can be done in a group or one-on-one, and often takes place for a set number of sessions. CBT has two main parts:  · Cognitive therapy helps you identify the negative, irrational thoughts that occur with your anxiety. Youll learn to replace these with more positive, realistic thoughts.  · Behavioral therapy helps you change how you react to anxiety. Youll learn coping skills and methods for relaxing to help you better deal with anxiety.  Other forms of therapy  Other therapy methods may work better for you than CBT. Or, you may move from CBT to another form of therapy as your treatment needs change. This may mean meeting with a therapist by yourself or in a group. Therapy can also help you work through problems in your life, such as drug or alcohol dependence, that may be making your anxiety worse.  Getting better takes time  Therapy will help you feel better and teach you skills to help manage anxiety long term. But change doesnt happen right away. It takes a commitment from you. And treatment only works if you learn to face the causes of your anxiety. So, you might feel worse before you feel better. This can sometimes make it hard to stick with it. But remember: Therapy is a very effective treatment. The results will be well worth it.  Helping yourself  If anxiety is wearing you down, here are some things you can do to cope:  · Dont fight your feelings. Anxiety feeds itself. The more you worry about it, the worse it gets. Instead, try to identify what might have triggered your anxiety. Then try to put this threat in perspective.  · Keep in mind that you cant control everything about a situation. Change what  you can and let the rest take its course.  · Exercise -- its a great way to relieve tension and help your body feel relaxed.  · Examine your life for stress, and try to find ways to reduce it.  · Avoid caffeine and nicotine, which can make anxiety symptoms worse.  · Fight the temptation to turn to alcohol or unprescribed drugs for relief. They only make things worse in the long run.   Date Last Reviewed: 1/19/2015 © 2000-2016 "[employer]. [address] [place] [address], [place] [address]. All rights reserved. This information is not intended as a substitute for professional medical care. Always follow your healthcare professional's instructions.        What is High Blood Pressure?  High blood pressure (also called hypertension) is known as the silent killer. This is because most of the time it doesnt cause symptoms. In fact, many people dont know they have it until other problems develop. In most cases, high blood pressure cant be cured. Its a disease that often requires lifelong treatment. The good news is that it can be managed.  Understanding blood pressure  The circulatory system is made up of the heart and blood vessels that carry blood through the body. Your heart is the pump for this system. With each heartbeat (contraction), the heart sends blood out through large blood vessels called arteries. Blood pressure is a measure of how hard the moving blood pushes against the walls of the arteries.  High blood pressure can harm your health  In a healthy blood vessel, the blood moves smoothly through the vessel and puts normal pressure on the vessel walls.       High blood pressure occurs when blood pushes too hard against artery walls. This causes damage to the artery walls and then the formation of scar tissue as it heals. This makes the arteries stiff and weak. Plaque sticks to the scarred tissue narrowing and hardening the arteries. High blood pressure also causes your heart to work harder to get  "blood out to the body. High blood pressure raises your risk of heart attack, also known as acute myocardial infarction, or AMI, and stroke. It can also lead to kidney disease, and blindness.  Measuring blood pressure  An example of a blood pressure measurement is 120/70 (120 over 70). The top number is the pressure of blood against the artery walls during a heartbeat (systolic). The bottom number is the pressure of blood against artery walls between heartbeats (diastolic). Talk with your healthcare provider to find out what your blood pressure goals should be.   Controlling blood pressure  If your blood pressure is too high, work with your doctor on a plan for lowering it. Below are steps you can take that will help lower your blood pressure.  · Choose heart-healthy foods. Eating healthier meals helps you control your blood pressure. Ask your doctor about the DASH eating plan. This plan helps reduce blood pressure by limiting the amount of sodium (salt) you have in your diet. DASH also encourages eating plenty of fruits and vegetables, low-fat or non-fat dairy, whole-grains, and foods high in fiber, and low in fat.  · Reduce sodium. Reducing sodium in your diet reduces fluid retention. Fluid retention caused by too much salt increases blood volume and blood pressure. The American Heart Associations (AHA) "ideal" sodium intake recommendation is 1,500 milligrams per day.  However, since American's eat so much salt, the AHA says a positive change can occur by cutting back to even 2,400 milligrams of sodium a day.  · Maintain a healthy weight. Being overweight makes you more likely to have high blood pressure. Losing excess weight helps lower blood pressure.  · Exercise regularly. Daily exercise helps your heart and blood vessels work better and stay healthier. It can help lower your blood pressure.  · Stop smoking. Smoking increases blood pressure and damages blood vessels.  · Limit alcohol. Drinking too much alcohol " can raise blood pressure. Men should have no more than 2 drinks a day. Women should have no more than 1. (A drink is equal to 1 beer, or a small glass of wine, or a shot of liquor.)  · Control stress. Stress makes your heart work harder and beat faster. Controlling stress helps you control your blood pressure.  Facts about high blood pressure  · Feeling OK does not mean that blood pressure is under control. Likewise, feeling bad doesnt mean its out of control. The only way to know for sure is to check your pressure regularly.  · Medicine is only one part of controlling high blood pressure. You also need to manage your weight, get regular exercise, and adjust your eating habits.  · High blood pressure is usually a lifelong problem. But it can be controlled with healthy lifestyle changes and medicine.  · Hypertension is not the same as stress. Although stress may be a factor in high blood pressure, its only one part of the story.  · Blood pressure medicines need to be taken every day. Stopping suddenly may cause a dangerous increase in pressure.   Date Last Reviewed: 4/27/2016 © 2000-2016 [email or web address]. [address] [place] [address]. All rights reserved. This information is not intended as a substitute for professional medical care. Always follow your healthcare professional's instructions.        [place] Diet  Your healthcare provider may recommend a bland diet if you have an upset stomach. It consists of foods that are mild and easy to digest. It is better to eat small frequent meals rather than 3 large meals a day.    Beverages  OK: Fruit juices, non-caffeinated teas and coffee, non-carbonated valencia  Avoid: Carbonated beverage, caffeinated tea and coffee, all alcoholic beverages  Bread  OK: Refined white, wheat or rye bread, jovan or soda crackers, [place] toast, plain rolls, bagels  Avoid: Whole-grain bread  Cereal  OK: Refined cereals: cooked or ready to eat  Avoid: Whole-grain cereals  "and granola, or those containing bran, seeds or nuts  Desserts  OK: Peanut butter and all others except those to "avoid"  Avoid: Chocolate, cocoa, coconut, popcorn, nuts, seeds, jam, marmalade  Fruits  OK: Canned, cooked, frozen or fresh fruits without seeds or tough skin  Avoid: Olives, skin and seeds of fruit  Meats  OK: All fresh or preserved meat, fish and fowl  Avoid: Any that are prepared with those spices to "avoid"  Cheese and eggs  OK: Eggs, cottage cheese, cream cheese, other cheeses  Avoid: All cheeses made with those spices to "avoid"  Potatoes and pasta  OK: Potato, rice, macaroni, noodles, spaghetti  Avoid: None  Soups  OK: All soups without heavy seasoning  Avoid: Soups made with those spices to "avoid"  Vegetables  OK: Canned, cooked, fresh or frozen mildly flavored vegetables without seeds, skins or coarse fiber  Avoid: Vegetables prepared with those spices to "avoid"; skin and seeds of vegetables and those with coarse fiber  Spices  OK: Salt, lemon and lime juice, vinegar, all extracts, noble, cinnamon, thyme, mace, allspice, paprika  Avoid: Chili powder, cloves, pepper, seed spices, garlic, gravy pickles, highly seasoned salad dressings  Date Last Reviewed: 11/20/2015  © 4260-3756 [employer]. [address] [place] [address]. All rights reserved. This information is not intended as a substitute for professional medical care. Always follow your healthcare professional's instructions.        Discharge Instructions: Eating a Soft Diet  You have been prescribed a soft diet (also called gastrointestinal soft diet or bland diet). This reduces the amount of work your digestive tract has to do. It also reduces the chance that your digestive tract will be irritated by the food you eat. A soft diet is prescribed for people with digestive problems. The diet consists of foods that are tender, mildly seasoned, and easy to digest. While on this diet, you should not eat fried or spicy " foods, or raw fruits and vegetables. Also avoid alcoholic beverages.  General guidelines  · Eat in a calm, relaxed atmosphere. How you eat may be as important as what you eat. Dont rush while eating. Chew your food slowly and thoroughly, and swallow slowly.  · Eat small frequent meals throughout the day, but dont eat within 2 hours of bedtime.  · Avoid any foods that cause discomfort.  · Dont use NSAIDs (nonsteroidal anti-inflammatory drugs), such as aspirin, and ibuprofen. Also avoid medicine that contain aspirin. NSAIDs can cause ulcers and delay or prevent ulcer healing.  · Use antacids as needed, but keep in mind that magnesium-containing antacids may cause diarrhea.  Foods to eat  · Cream of wheat and cream of rice  · Cooked white rice  · Mashed potatoes, and boiled potatoes without skin  · Plain pasta and noodles  · Plain white crackers (such as no-salt soda crackers)  · White bread  · Applesauce  · Cooked fruits without skins or seeds  · Mild juices, such as apple and grape  · Bananas  · Cooked or mashed vegetables without stems and seeds  ¨ Carrots  ¨ Summer squash (zucchini, yellow squash)  ¨ Winter squash (acorn, butternut, spaghetti squash)  · Cottage cheese  · Mild hard or soft cheeses  · Custard  · Yogurt without seeds or nuts  · Milk (you may need lactose-free milk)  · Ice cream without seeds or nuts  · Smooth peanut butter  · Eggs  · Fish, turkey, chicken, or other meat that is not tough or stringy  · Tofu  Foods to avoid  · Nuts and seeds  · Snack foods, such as the following:  ¨ Chocolate-containing snacks, candy, pastries, or cakes.  ¨ Potato chips (plain, barbecued, or other flavors)  ¨ Taco chips or nachos  ¨ Corn chips  ¨ Popcorn, popcorn cakes, or rice cakes  ¨ Crackers with nuts, seeds, or spicy seasonings  ¨ French fries  · Fried or greasy foods  · Whole-grain breads, rolls, and crackers  · Breads and rolls with nuts, seeds, or bran  · Bran and granola cereals  · Berries with seeds, such  as strawberries, raspberries, and blackberries  · Acidic fruits, such as oranges, grapefruits, elio, limes, and pineapples  · Raw vegetables  · Mild or hot peppers  · Sauerkraut and pickled vegetables  · Tomatoes or tomato products, such as tomato paste, tomato sauce, and tomato juice  · Barbecue sauce  · Spicy or flavored cheeses, such as jalapeño and black pepper cheese  · Crunchy peanut butter  · Dried cooked beans, such as mishra, kidney, or navy beans  · The following meats:  ¨ Fried or greasy meats  ¨ Processed, spicy meats, such as sausage, jarrell, ham, and lunch meats  ¨ Ribs and other meats with barbecue sauce  ¨ Tough or stringy meats, such as corned beef or beef jerky  Fluids to avoid  · Alcoholic beverages  · Coffee and regular teas  · Miki and other drinks with caffeine  · Cranberry, orange, pineapple, and grapefruit juice  · Lemonade  · Vegetable juice  · Whole milk, if you are lactose intolerant  Follow-up  Make a follow-up appointment with a dietitian as directed by our staff.  Date Last Reviewed: 6/21/2015  © 6941-3820 [employer]. [address] [place] [address], [place] [address]. All rights reserved. This information is not intended as a substitute for professional medical care. Always follow your healthcare professional's instructions.

## 2017-08-30 NOTE — TELEPHONE ENCOUNTER
----- Message from Gino Lorenzo RN sent at 8/30/2017  2:43 PM CDT -----  Contact: Aleah Parsons, MIKE-OPCMANISH  Good afternoon. I am working with the above patient in Outpatient Case Management. I spoke with the patient today who reports that she has lost 10 pounds in the last 6 weeks due to Crohn's and loss of appetite. Patient stated that she feels that her nutrition is not adequate at this time. She states she can only tolerate bland foods. I'm wondering if a referral to a dietician may be warranted and helpful for this patient. If you feel that this is an appropriate request, could you please write a referral to meet with a Dietician? Please advise.    Kind Regards,  Aleah Parsons, RN-OPCM  [id number]

## 2017-08-30 NOTE — TELEPHONE ENCOUNTER
Patient confirmed shipping of Stelara 90mg/0.5ml 17 and receive counseling on 17. 2 patient identifiers confirmed - name and .   Counseled patient on administration directions:   Inject Stelara 90mg/0.5ml into the skin q 8 weeks.  Self-administered in the home by patient. Confirmed with dr roy.   Patient was counseled on possible side effects:   - Injection site reaction: redness, soreness, itching, bruising, which should resolve within 3-5 days. Monitor for signs of allergic reaction.   - Upper respiratory infections, headaches, fatigue.    - Increased risk for infections. Report new vaccinations before receiving as well.  Counseled patient on administration directions:  - Take out of the refrigerator 30 minutes prior to injection.  - Wash hands before and after injection.  -  RX will come with gauze, bandaids, and alcohol swabs.  - Patient is to wipe down the injection site with the alcohol pad, wait to dry.  Patient will read injection instructions and possible watch a video on injection process.  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 He/ She can then contact the Pharmacy and we will replace the sharps at no additional charge.   Medication list reviewed - No DDIs.  Patient verbalized understanding. First dose was received on Stelara on 17, and feels fine.Consultation included: indication; goals of treatment; administration; storage and handling; side effects; how to handle side effects; the importance of compliance; how to handle missed doses; the importance of laboratory monitoring; the importance of keeping all follow up appointments.  Patient understands to report any medication changes to [facility] and provider. All questions answered and addressed to patients satisfaction. I will f/u with her in 1 week from start, [facility] to contact patient in 7 weeks for  refills.

## 2017-08-30 NOTE — TELEPHONE ENCOUNTER
----- Message from Gino Lorenzo RN sent at 8/30/2017  2:43 PM CDT -----  Contact: Aleah Parsons, MIKE-OPCMANISH  Good afternoon. I am working with the above patient in Outpatient Case Management. I spoke with the patient today who reports that she has lost 10 pounds in the last 6 weeks due to Crohn's and loss of appetite. Patient stated that she feels that her nutrition is not adequate at this time. She states she can only tolerate bland foods. I'm wondering if a referral to a dietician may be warranted and helpful for this patient. If you feel that this is an appropriate request, could you please write a referral to meet with a Dietician? Please advise.    Kind Regards,  Aleah Parsons, RN-OPCM  [id number]

## 2017-09-04 ENCOUNTER — PATIENT MESSAGE (OUTPATIENT)
Dept: INTERNAL MEDICINE | Facility: CLINIC | Age: 37
End: 2017-09-04

## 2017-09-04 ENCOUNTER — PATIENT MESSAGE (OUTPATIENT)
Dept: GASTROENTEROLOGY | Facility: CLINIC | Age: 37
End: 2017-09-04

## 2017-09-05 ENCOUNTER — PATIENT MESSAGE (OUTPATIENT)
Dept: INTERNAL MEDICINE | Facility: CLINIC | Age: 37
End: 2017-09-05

## 2017-09-06 ENCOUNTER — PATIENT MESSAGE (OUTPATIENT)
Dept: INTERNAL MEDICINE | Facility: CLINIC | Age: 37
End: 2017-09-06

## 2017-09-06 ENCOUNTER — OFFICE VISIT (OUTPATIENT)
Dept: UROLOGY | Facility: CLINIC | Age: 37
End: 2017-09-06
Attending: UROLOGY
Payer: COMMERCIAL

## 2017-09-06 ENCOUNTER — OFFICE VISIT (OUTPATIENT)
Dept: INTERNAL MEDICINE | Facility: CLINIC | Age: 37
End: 2017-09-06
Payer: COMMERCIAL

## 2017-09-06 VITALS
SYSTOLIC BLOOD PRESSURE: 130 MMHG | OXYGEN SATURATION: 97 % | WEIGHT: 99.63 LBS | BODY MASS INDEX: 18.81 KG/M2 | DIASTOLIC BLOOD PRESSURE: 84 MMHG | HEIGHT: 61 IN | HEART RATE: 97 BPM

## 2017-09-06 VITALS
HEART RATE: 80 BPM | BODY MASS INDEX: 18.94 KG/M2 | HEIGHT: 61 IN | SYSTOLIC BLOOD PRESSURE: 153 MMHG | DIASTOLIC BLOOD PRESSURE: 104 MMHG | WEIGHT: 100.31 LBS

## 2017-09-06 DIAGNOSIS — Z87.440 HISTORY OF RECURRENT UTIS: ICD-10-CM

## 2017-09-06 DIAGNOSIS — R33.9 URINARY RETENTION: Primary | ICD-10-CM

## 2017-09-06 DIAGNOSIS — R21 RASH: Primary | ICD-10-CM

## 2017-09-06 LAB
BILIRUB SERPL-MCNC: ABNORMAL MG/DL
BLOOD URINE, POC: 50
COLOR, POC UA: YELLOW
GLUCOSE UR QL STRIP: ABNORMAL
KETONES UR QL STRIP: ABNORMAL
LEUKOCYTE ESTERASE URINE, POC: ABNORMAL
NITRITE, POC UA: ABNORMAL
PH, POC UA: 5
POC RESIDUAL URINE VOLUME: 341 ML (ref 0–100)
PROTEIN, POC: ABNORMAL
SPECIFIC GRAVITY, POC UA: 1
UROBILINOGEN, POC UA: ABNORMAL

## 2017-09-06 PROCEDURE — 99244 OFF/OP CNSLTJ NEW/EST MOD 40: CPT | Mod: 25,S$GLB,, | Performed by: UROLOGY

## 2017-09-06 PROCEDURE — 3079F DIAST BP 80-89 MM HG: CPT | Mod: S$GLB,,, | Performed by: INTERNAL MEDICINE

## 2017-09-06 PROCEDURE — 99213 OFFICE O/P EST LOW 20 MIN: CPT | Mod: S$GLB,,, | Performed by: INTERNAL MEDICINE

## 2017-09-06 PROCEDURE — 3075F SYST BP GE 130 - 139MM HG: CPT | Mod: S$GLB,,, | Performed by: INTERNAL MEDICINE

## 2017-09-06 PROCEDURE — 3008F BODY MASS INDEX DOCD: CPT | Mod: S$GLB,,, | Performed by: INTERNAL MEDICINE

## 2017-09-06 PROCEDURE — 51798 US URINE CAPACITY MEASURE: CPT | Mod: S$GLB,,, | Performed by: UROLOGY

## 2017-09-06 PROCEDURE — 99999 PR PBB SHADOW E&M-EST. PATIENT-LVL III: CPT | Mod: PBBFAC,,, | Performed by: INTERNAL MEDICINE

## 2017-09-06 PROCEDURE — 81002 URINALYSIS NONAUTO W/O SCOPE: CPT | Mod: S$GLB,,, | Performed by: UROLOGY

## 2017-09-06 RX ORDER — ACYCLOVIR 800 MG/1
800 TABLET ORAL 4 TIMES DAILY
COMMUNITY
End: 2017-09-18

## 2017-09-06 RX ORDER — GABAPENTIN 300 MG/1
300 CAPSULE ORAL 3 TIMES DAILY
COMMUNITY
End: 2017-09-18

## 2017-09-06 NOTE — LETTER
September 6, 2017      Kalia Astorga MD  [address]  [facility] [address]           [facility]  [address] [place] [address], [facility] [address]  [facility] [address]  Phone: [phone]          Patient: Ivy Salgado   MR Number: [id number]   YOB: 1980   Date of Visit: 9/6/2017       Dear Dr. Kalia Astorga:    Thank you for referring Ivy Salgado to me for evaluation. Attached you will find relevant portions of my assessment and plan of care.    If you have questions, please do not hesitate to call me. I look forward to following Ivy Salgado along with you.    Sincerely,    Phu Obrien MD    Enclosure  CC:  No Recipients    If you would like to receive this communication electronically, please contact [email or web address] or [phone] to request more information on [employer] access.    For providers and/or their staff who would like to refer a patient to [facility], please contact us through our one-stop-shop provider referral line, [facility], at [phone].    If you feel you have received this communication in error or would no longer like to receive these types of communications, please e-mail [email or web address]

## 2017-09-06 NOTE — PROGRESS NOTES
Subjective:       Patient ID: Ivy Salgado is a 37 y.o. female.    Chief Complaint: Rash    Pt c/o rash on L cheek that started 10 days ago. It burns. It progressed so went to UC and was dx with shingles and placed on acyclovir. There has been slight improvement but she has new similar rash on R cheek now and L cheek rash is still fiery red and burns. She said there were a couple of blisters initially but now none. Taking gabapentin which she thinks helps some as well. Of note, she last received Stelara 6 wks ago for crohn's. She has felt a little more fatigue but o/w has not had any new fever or arthralgias. No eye involvement.       Review of Systems   Constitutional: Negative for fever.   Eyes: Negative for pain, redness and visual disturbance.   Respiratory: Negative for shortness of breath.    Cardiovascular: Negative for chest pain.   Skin: Positive for rash.   Psychiatric/Behavioral: Negative for dysphoric mood.       Objective:      Physical Exam   Constitutional: She is oriented to person, place, and time. She appears well-developed and well-nourished.   HENT:   Right Ear: Tympanic membrane, external ear and ear canal normal.   Left Ear: Tympanic membrane, external ear and ear canal normal.   Nose: No mucosal edema or rhinorrhea.   Mouth/Throat: No oropharyngeal exudate or posterior oropharyngeal erythema.   Eyes: Conjunctivae and EOM are normal. Pupils are equal, round, and reactive to light. Right eye exhibits no discharge. Left eye exhibits no discharge.   Neck: Neck supple. No thyromegaly present.   Lymphadenopathy:     She has no cervical adenopathy.   Neurological: She is alert and oriented to person, place, and time.   Skin:   Erythematous rash with papular appearance on L cheek and less erythema but similar rash to R cheek; no vesicles   Psychiatric: She has a normal mood and affect. Her behavior is normal.       Assessment:       1. Rash        Plan:       1. I am not convinced this is shingles  as appearance seems atypical and now with new appearing rash on R cheek in spite of acyclovir; pt has been scheduled to see derm today to confirm; she will continue with acyclovir for now

## 2017-09-06 NOTE — PROGRESS NOTES
Subjective:      Ivy Salgado is a 37 y.o. female who was referred by Kalia Astorga MD for evaluation of recurrent UTIs.      Recurrent UTIs  Patient complains of recurrent urinary tract infections.  These have been occuring intermittently for around 5 years. Of her recent infections, at least 2 are culture proven, including E. coli.  She describes associated symptoms during these episodes as burning with urination, frequency and urgency.  She denies associated fever and flank pain (did have mild pain with most recent episode).  She reports that symptoms improve with antibiotic treatment.  She is is sexually active with 1 partners.  The timing of her infections is not related to intercourse.  Other treatments have included none.       She has h/o Crohn's disease s/p colectomy in 2012. 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 She was seen by Dr. Ray in 2013 for same issue (recurrent UTIs) and had normal cysto at that time. Prior to the past few months, she reports about 1-2 UTIs per year in the interim.    At baseline she does report sometimes straining to void.    The following portions of the patient's history were reviewed and updated as appropriate: allergies, current medications, past family history, past medical history, past social history, past surgical history and problem list.    Review of Systems  Constitutional: no fever or chills  ENT: no nasal congestion or sore throat  Respiratory: no cough or shortness of breath  Cardiovascular: no chest pain or palpitations  Gastrointestinal: no nausea or vomiting, tolerating diet  Genitourinary: as per HPI  Hematologic/Lymphatic: no easy bruising or lymphadenopathy  Musculoskeletal: no arthralgias or myalgias  Neurological: no seizures or tremors  Behavioral/Psych: no auditory or visual hallucinations     Objective:   Vital Signs:BP (!) 153/104 (BP Location: Left arm, Patient Position: Sitting, BP Method:  "Large (Automatic))   Pulse 80   Ht 5' 1" (1.549 m)   Wt 45.5 kg (100 lb 5 oz)   LMP 03/30/2015   BMI 18.95 kg/m²     Physical Exam   General: alert and oriented, no acute distress  Head: normocephalic, atraumatic  Neck: supple, no lymphadenopathy, normal ROM, no masses  Respiratory: Symmetric expansion, non-labored breathing  Cardiovascular: regular rate and rhythm, nomal pulses, no peripheral edema  Skin: normal coloration and turgor, no rashes, no suspicious skin lesions noted  Neuro: alert and oriented x3, no gross deficits  Psych: normal judgment and insight, normal mood/affect and non-anxious    Bladder Scan PVR: 341cc      Lab Review   Urinalysis demonstrates negative for all components  Lab Results   Component Value Date    WBC 8.34 08/10/2017    HGB 11.6 (L) 08/10/2017    HCT 34.1 (L) 08/10/2017    MCV 90 08/10/2017     08/10/2017     Lab Results   Component Value Date    CREATININE 0.8 08/10/2017    BUN 7 08/10/2017     Assessment:     1. Urinary retention    2. History of recurrent UTIs        Plan:   1. Elevated PVR is likely etiology for retention. Given history, retention is concerning for NGB 2/2 multiple prior bowel surgeries.  2. Start CIC now. Instructed to perform 3-4 times per day.   3. Will schedule SUDS and cysto to further evaluate    "

## 2017-09-07 ENCOUNTER — PATIENT MESSAGE (OUTPATIENT)
Dept: GASTROENTEROLOGY | Facility: CLINIC | Age: 37
End: 2017-09-07

## 2017-09-07 NOTE — TELEPHONE ENCOUNTER
Added most of the requested labs. The following labs were not found in the system: SSA/SSB antibodies, anti-smith, anti-ds DNA amd CBC w/ diff and platelets. Please advise/authorize?

## 2017-09-08 ENCOUNTER — TELEPHONE (OUTPATIENT)
Dept: GASTROENTEROLOGY | Facility: CLINIC | Age: 37
End: 2017-09-08

## 2017-09-08 ENCOUNTER — OUTPATIENT CASE MANAGEMENT (OUTPATIENT)
Dept: ADMINISTRATIVE | Facility: OTHER | Age: 37
End: 2017-09-08

## 2017-09-08 ENCOUNTER — TELEPHONE (OUTPATIENT)
Dept: UROLOGY | Facility: CLINIC | Age: 37
End: 2017-09-08

## 2017-09-08 NOTE — TELEPHONE ENCOUNTER
Spoke with patient. I will discuss patient with Dr. Obrien and call patient to make appointment for SUDs.

## 2017-09-08 NOTE — TELEPHONE ENCOUNTER
----- Message from Parish Ross MD sent at 9/8/2017  8:12 AM CDT -----  Contact: self - [phone]  yes  ----- Message -----  From: Belinda Sullivan MA  Sent: 9/7/2017   3:09 PM  To: Parish Ross MD        ----- Message -----  From: Quiana Kermit  Sent: 9/7/2017   3:05 PM  To: Cody Ross - being worked up for lupus - can she take her stalera injection - please call patient at

## 2017-09-08 NOTE — TELEPHONE ENCOUNTER
----- Message from Parish Ross MD sent at 9/8/2017  8:12 AM CDT -----  Contact: self - [phone]  yes  ----- Message -----  From: Belinda Sullivan MA  Sent: 9/7/2017   3:09 PM  To: Parish Ross MD        ----- Message -----  From: Quiana Kermit  Sent: 9/7/2017   3:05 PM  To: Cody Ross - being worked up for lupus - can she take her stalera injection - please call patient at

## 2017-09-11 ENCOUNTER — OUTPATIENT CASE MANAGEMENT (OUTPATIENT)
Dept: ADMINISTRATIVE | Facility: OTHER | Age: 37
End: 2017-09-11

## 2017-09-11 NOTE — PROGRESS NOTES
9/11/2017:   spoke to patient via telephone in order to complete assessment.  She was tearful during our conversation when talking about her financial situation.  Pt will be moving in with her cousin soon due to financial issues.  She has applied for disability, [employer] [place] and Medicaid.      Pt has a 10 year old daughter.  The daughter's father pays for her tuition and insurance.  He does not pay child support.  Pt has been out of work since the end of July.  She reported that she has paid for long and short term insurance; however, she has not received payment for this.  Her father has assisted her financially when needed for medications.       encouraged patient to contact [facility] Human Resource department in order to obtain paperwork for food stamp application and to check on the status of short term disability payments.       mailed patient information re: [facility] Legal Services, [facility] Employee Emergency Assistance Fund Application, Patient Assistance Fund Application, [place] [employer], and [employer] information     Plan for next encounter:  1. Ensure that patient received mailings  2. Answer any questions  3. Assess for additional needs

## 2017-09-12 ENCOUNTER — INFUSION (OUTPATIENT)
Dept: INFECTIOUS DISEASES | Facility: HOSPITAL | Age: 37
End: 2017-09-12
Attending: INTERNAL MEDICINE
Payer: COMMERCIAL

## 2017-09-12 ENCOUNTER — PATIENT MESSAGE (OUTPATIENT)
Dept: GASTROENTEROLOGY | Facility: CLINIC | Age: 37
End: 2017-09-12

## 2017-09-12 ENCOUNTER — TELEPHONE (OUTPATIENT)
Dept: INTERNAL MEDICINE | Facility: CLINIC | Age: 37
End: 2017-09-12

## 2017-09-12 VITALS
WEIGHT: 99 LBS | BODY MASS INDEX: 18.69 KG/M2 | RESPIRATION RATE: 18 BRPM | SYSTOLIC BLOOD PRESSURE: 135 MMHG | HEIGHT: 61 IN | TEMPERATURE: 98 F | DIASTOLIC BLOOD PRESSURE: 96 MMHG | OXYGEN SATURATION: 100 % | HEART RATE: 65 BPM

## 2017-09-12 DIAGNOSIS — K50.819 CROHN'S DISEASE OF SMALL AND LARGE INTESTINES WITH COMPLICATION: Primary | ICD-10-CM

## 2017-09-12 DIAGNOSIS — R21 RASH: ICD-10-CM

## 2017-09-12 LAB
ALBUMIN SERPL BCP-MCNC: 3 G/DL
ALP SERPL-CCNC: 60 U/L
ALT SERPL W/O P-5'-P-CCNC: 13 U/L
ANION GAP SERPL CALC-SCNC: 9 MMOL/L
AST SERPL-CCNC: 16 U/L
BASOPHILS # BLD AUTO: 0.01 K/UL
BASOPHILS NFR BLD: 0.2 %
BILIRUB SERPL-MCNC: 0.7 MG/DL
BUN SERPL-MCNC: 6 MG/DL
C3 SERPL-MCNC: 120 MG/DL
C4 SERPL-MCNC: 22 MG/DL
CALCIUM SERPL-MCNC: 7.6 MG/DL
CHLORIDE SERPL-SCNC: 111 MMOL/L
CO2 SERPL-SCNC: 21 MMOL/L
CREAT SERPL-MCNC: 0.7 MG/DL
DIFFERENTIAL METHOD: ABNORMAL
EOSINOPHIL # BLD AUTO: 0 K/UL
EOSINOPHIL NFR BLD: 0.5 %
ERYTHROCYTE [DISTWIDTH] IN BLOOD BY AUTOMATED COUNT: 13.6 %
ERYTHROCYTE [SEDIMENTATION RATE] IN BLOOD BY WESTERGREN METHOD: 20 MM/HR
EST. GFR  (AFRICAN AMERICAN): >60 ML/MIN/1.73 M^2
EST. GFR  (NON AFRICAN AMERICAN): >60 ML/MIN/1.73 M^2
GLUCOSE SERPL-MCNC: 79 MG/DL
HCT VFR BLD AUTO: 36.3 %
HGB BLD-MCNC: 12 G/DL
LYMPHOCYTES # BLD AUTO: 1.9 K/UL
LYMPHOCYTES NFR BLD: 28.5 %
MCH RBC QN AUTO: 29.9 PG
MCHC RBC AUTO-ENTMCNC: 33.1 G/DL
MCV RBC AUTO: 91 FL
MONOCYTES # BLD AUTO: 0.6 K/UL
MONOCYTES NFR BLD: 9.2 %
NEUTROPHILS # BLD AUTO: 4.1 K/UL
NEUTROPHILS NFR BLD: 61.4 %
PLATELET # BLD AUTO: 271 K/UL
PMV BLD AUTO: 9.3 FL
POTASSIUM SERPL-SCNC: 3.6 MMOL/L
PROT SERPL-MCNC: 6.5 G/DL
RBC # BLD AUTO: 4.01 M/UL
SODIUM SERPL-SCNC: 141 MMOL/L
WBC # BLD AUTO: 6.62 K/UL

## 2017-09-12 PROCEDURE — 86160 COMPLEMENT ANTIGEN: CPT | Mod: 59

## 2017-09-12 PROCEDURE — 83516 IMMUNOASSAY NONANTIBODY: CPT

## 2017-09-12 PROCEDURE — 86235 NUCLEAR ANTIGEN ANTIBODY: CPT

## 2017-09-12 PROCEDURE — 85025 COMPLETE CBC W/AUTO DIFF WBC: CPT

## 2017-09-12 PROCEDURE — 85651 RBC SED RATE NONAUTOMATED: CPT

## 2017-09-12 PROCEDURE — 86235 NUCLEAR ANTIGEN ANTIBODY: CPT | Mod: 59

## 2017-09-12 PROCEDURE — 25000003 PHARM REV CODE 250: Performed by: INTERNAL MEDICINE

## 2017-09-12 PROCEDURE — 86038 ANTINUCLEAR ANTIBODIES: CPT

## 2017-09-12 PROCEDURE — 86225 DNA ANTIBODY NATIVE: CPT

## 2017-09-12 PROCEDURE — 80053 COMPREHEN METABOLIC PANEL: CPT

## 2017-09-12 PROCEDURE — 36415 COLL VENOUS BLD VENIPUNCTURE: CPT

## 2017-09-12 PROCEDURE — 82085 ASSAY OF ALDOLASE: CPT

## 2017-09-12 PROCEDURE — 86160 COMPLEMENT ANTIGEN: CPT

## 2017-09-12 RX ORDER — IPRATROPIUM BROMIDE AND ALBUTEROL SULFATE 2.5; .5 MG/3ML; MG/3ML
3 SOLUTION RESPIRATORY (INHALATION)
Status: CANCELLED | OUTPATIENT
Start: 2017-09-12

## 2017-09-12 RX ORDER — EPINEPHRINE 1 MG/ML
0.3 INJECTION, SOLUTION, CONCENTRATE INTRAVENOUS
Status: CANCELLED | OUTPATIENT
Start: 2017-09-12

## 2017-09-12 RX ORDER — DIPHENHYDRAMINE HYDROCHLORIDE 50 MG/ML
25 INJECTION INTRAMUSCULAR; INTRAVENOUS
Status: CANCELLED | OUTPATIENT
Start: 2017-09-12

## 2017-09-12 RX ORDER — ACETAMINOPHEN 325 MG/1
650 TABLET ORAL
Status: CANCELLED | OUTPATIENT
Start: 2017-09-12

## 2017-09-12 RX ADMIN — SODIUM CHLORIDE 2000 ML: 0.9 INJECTION, SOLUTION INTRAVENOUS at 10:09

## 2017-09-12 NOTE — TELEPHONE ENCOUNTER
----- Message from Ladonna Lynn sent at 9/12/2017 11:52 AM CDT -----  Contact: Self  X  1st Request  _  2nd Request  _  3rd Request        Who: PHANI RODRIGUEZ [[id number]]    Why: Pt states she wants to know if an order for one of her lab tests can be changed. Please call pt to further discuss,thanks!    What Number to Call Back: [phone]    When to Expect a call back: (With in 24 hours)  1:34 PM  Chart reflects that the patient did get the labs changed to clinic collect. [employer] message sent to confirm that the patient was able to have her requested labs done.

## 2017-09-13 LAB
ALDOLASE SERPL-CCNC: 4.2 U/L
ALDOLASE SERPL-CCNC: 4.2 U/L
ANA SER QL IF: NORMAL
DSDNA AB SER-ACNC: NORMAL [IU]/ML

## 2017-09-14 LAB
ANTI SM ANTIBODY: 4.51 EU
ANTI-SM INTERPRETATION: NEGATIVE
ANTI-SSA ANTIBODY: 2.96 EU
ANTI-SSA INTERPRETATION: NEGATIVE
ANTI-SSB ANTIBODY: 0.37 EU
ANTI-SSB INTERPRETATION: NEGATIVE
ENA SCL70 AB SER-ACNC: 4 UNITS

## 2017-09-15 ENCOUNTER — OUTPATIENT CASE MANAGEMENT (OUTPATIENT)
Dept: ADMINISTRATIVE | Facility: OTHER | Age: 37
End: 2017-09-15

## 2017-09-15 LAB — HISTONE IGG SER IA-ACNC: 0.6 UNITS (ref 0–0.9)

## 2017-09-15 NOTE — PROGRESS NOTES
"09/15/2017  RNNINA contacted patient for follow up and update to plan of care. Patient reports that she received all the literature that was mailed to her. She reports that she has been in contact with the  and has received the information and applications that were mailed to her by the . She reports that she has already applied for disability, food stamps and Medicaid. She is in the process of completing the [employer] Employee Emergency Assistance [employer] Application and the [employer] Patient Assistance Fund  Application. Patient states she is just in a waiting mode to see what she will be qualified for. She reports that she is very knowledgeable about her Crohn's Disease and is able to articulate the signs/symptoms of a flare-up. We talked about the signs/symptoms of worsening depression. Patient states that she is seeing  for therapy and denies any thoughts of self harm. She is taking anti depressant/anti anxiety medications which she states are helping her. She acknowledges receipt of the contact information for support groups. She states that her health status and her current financial situation is the reason for her current state of anxiety. She feels that once things are more settled and she is able to move on, she will feel much better. She states that she currently feels like she's in a "state of limbo", just waiting. She has a 10 year old daughter to care for and she does not receive child support from her daughter's father. She reports that he pays her tuition and medical insurance, but no child support. Patient states she is moving in with her cousin due to her limited financial situation. She feels that she is doing everything she can do at this point. Offered emotional support and encouraged patient to contact this RN-CM and/or [facility] if any needs arise. Encouraged patient to continue to follow her medication and treatment regimen. Reviewed the upcoming appointments scheduled " on 9/18 and 9/19 and encouraged patient to maintain follow up with doctors. Will continue to follow.    Follow Up Plan:  - Review case with SW to determine if patient qualifies for any additional programs.   - Follow up with any applications that patient needs to complete for assistance.  - Assess for any additional needs.

## 2017-09-18 ENCOUNTER — OFFICE VISIT (OUTPATIENT)
Dept: GASTROENTEROLOGY | Facility: CLINIC | Age: 37
End: 2017-09-18
Payer: COMMERCIAL

## 2017-09-18 ENCOUNTER — OUTPATIENT CASE MANAGEMENT (OUTPATIENT)
Dept: ADMINISTRATIVE | Facility: OTHER | Age: 37
End: 2017-09-18

## 2017-09-18 VITALS
HEART RATE: 74 BPM | DIASTOLIC BLOOD PRESSURE: 84 MMHG | WEIGHT: 97.25 LBS | BODY MASS INDEX: 18.36 KG/M2 | HEIGHT: 61 IN | SYSTOLIC BLOOD PRESSURE: 126 MMHG

## 2017-09-18 DIAGNOSIS — K50.819 CROHN'S DISEASE OF BOTH SMALL AND LARGE INTESTINE WITH COMPLICATION: Primary | ICD-10-CM

## 2017-09-18 DIAGNOSIS — Z79.899 ENCOUNTER FOR LONG-TERM (CURRENT) USE OF HIGH-RISK MEDICATION: ICD-10-CM

## 2017-09-18 LAB — ENA JO1 AB SER IA-ACNC: <1 INDEX

## 2017-09-18 PROCEDURE — 3079F DIAST BP 80-89 MM HG: CPT | Mod: S$GLB,,, | Performed by: INTERNAL MEDICINE

## 2017-09-18 PROCEDURE — 99999 PR PBB SHADOW E&M-EST. PATIENT-LVL III: CPT | Mod: PBBFAC,,, | Performed by: INTERNAL MEDICINE

## 2017-09-18 PROCEDURE — 99215 OFFICE O/P EST HI 40 MIN: CPT | Mod: S$GLB,,, | Performed by: INTERNAL MEDICINE

## 2017-09-18 PROCEDURE — 3008F BODY MASS INDEX DOCD: CPT | Mod: S$GLB,,, | Performed by: INTERNAL MEDICINE

## 2017-09-18 PROCEDURE — 3074F SYST BP LT 130 MM HG: CPT | Mod: S$GLB,,, | Performed by: INTERNAL MEDICINE

## 2017-09-18 RX ORDER — METRONIDAZOLE 500 MG/1
500 TABLET ORAL 3 TIMES DAILY
Qty: 21 TABLET | Refills: 0 | Status: SHIPPED | OUTPATIENT
Start: 2017-09-18 | End: 2017-10-09 | Stop reason: SDUPTHER

## 2017-09-18 RX ORDER — ONDANSETRON 8 MG/1
8 TABLET, ORALLY DISINTEGRATING ORAL EVERY 8 HOURS PRN
Qty: 30 TABLET | Refills: 0 | Status: SHIPPED | OUTPATIENT
Start: 2017-09-18 | End: 2017-10-09 | Stop reason: SDUPTHER

## 2017-09-18 RX ORDER — DRONABINOL 2.5 MG/1
2.5 CAPSULE ORAL
Qty: 60 CAPSULE | Refills: 3 | Status: SHIPPED | OUTPATIENT
Start: 2017-09-18 | End: 2018-01-02 | Stop reason: SDUPTHER

## 2017-09-18 RX ORDER — VALACYCLOVIR HYDROCHLORIDE 500 MG/1
500 TABLET, FILM COATED ORAL DAILY
Qty: 30 TABLET | Refills: 0 | Status: SHIPPED | OUTPATIENT
Start: 2017-09-18 | End: 2017-11-29 | Stop reason: SDUPTHER

## 2017-09-18 RX ORDER — FLUCONAZOLE 100 MG/1
100 TABLET ORAL DAILY
Qty: 7 TABLET | Refills: 0 | Status: SHIPPED | OUTPATIENT
Start: 2017-09-18 | End: 2017-10-09 | Stop reason: SDUPTHER

## 2017-09-18 NOTE — PROGRESS NOTES
Please note an Outpatient Complex Care Management welcome packet and consent form was created and mailed to the patient on 9/18/17.    Please contact [facility] at [phone] with any questions.    Thank you,    Keshia Starks, SSC

## 2017-09-18 NOTE — LETTER
September 18, 2017    Ivy Salgado  [address] [address]             Outpatient Case Management  [address]  [facility] [address] Dear Ivy Salgado:    We understand that receiving many services from different doctors and healthcare providers is overwhelming. There are appointments to make, transportation to arrange, dietary instructions to understand, and new medications to obtain.    This is where [facility] can help.     You are eligible to receive Outpatient Case Management services when you have healthcare needs that require the coordination of many providers, treatments, and services. You also qualify if you need assistance with a new treatment plan.     There is no charge for this support. You may have been referred to this program from your doctor(s), hospital staff member(s), or insurance company but you always have a choice to participate or not participate. To participate, you must give us your permission to be enrolled.     When you are enrolled in the [facility] program, the  who is assigned to you is    Aleah Lorenzo, RN  [phone]    Depending on your needs and wishes, your  may speak with you by phone, visit you at your place of living (for example your home, skilled nursing facility, or rehabilitation facility), or meet you at your doctors office.     Your  will tell you why you have been selected to participate in the program and will complete an assessment of your needs. Then a personalized plan of care will be developed with you and or your caregiver.             Here are examples of the services your [facility]  provides.     Coordinate communication among multiple providers.   Arrange for transportation, doctors visits, durable medical equipment, home care services, and special clinics.    Provide coaching on how to manage your health condition.    Answer  questions about your health condition.   Help you understand your doctors treatment plan.    Provide additional instruction about your health condition, treatments, and medications.    Help you obtain information about your insurance coverage.    Advocate for your individual needs.    Request a Licensed Clinical  (LCSW) to visit you if you need their services. LCSWs help with long term planning (discussing placement options, advanced planning directives), financial planning, and assistance (for example rent, utilities, medication funding).     Your  will coordinate their activities with other outpatient services you are receiving. All services provided by [facility]  are coordinated with and communicated to your primary care physician.    Our goal is to help you manage your health condition(s) safely within your living environment, whether that is your home or a medical facility. We want to help you function at the healthiest and highest level possible.     Sincerely,      Frank Maria MD  Medical Director    Enclosures:    Frequently Asked Questions  Patient Rights and Responsibilities   Reporting a Grievance or Complaint  Consent/Release of Information  Stamped Addressed Envelope                  Frequently Asked Questions about [facility]    What is [facility]?  Outpatient Case management is not Home healthcare services. [facility]  do not provide hands-on care. [facility]  will work with your doctor to arrange for home health services, if needed. Home health services have a limited duration and there are some restrictions as to who can get these services. There is no prescribed limit to the amount of time you receive [facility] services. [facility]  are not agents of your insurance company. However, [facility] Case  Managers can help you obtain information from your insurance company.     Who are the [facility] ?  [facility]  are Registered Nurses and Social Workers. It is important to remember that you and your  are a team that works together with your primary care physician to create your individualized plan of care. The ultimate goal of your care plan is to help you implement your doctors treatment plan and to help you function at the highest level of health possible.     What are my rights as a patient?  It is important for you to know and understand your rights and responsibilities while receiving services from the [facility] program. We have enclosed a complete description of your rights and responsibilities. You can help to make your care more effective when you understand your right and responsibilities.     What is needed to be enrolled in the program?  You are only enrolled in the [facility] Program when you give us your consent to participate. You will find enclosed a consent form. You are receiving this letter because you or your caregivers have given us a verbal consent to enroll you in [facility] Program. We ask that you sign and return the enclosed written consent in the stamped self-addressed envelope.                           Patient Rights and Responsibilities    We consider you a partner in your care. When you are well informed, participate in treatment decisions and communicate openly with your doctor and other healthcare professionals, you help make your care more effective.     While you are in the Outpatient Case Management Program, your rights include the following:     You have a right to be provided services in a non-discriminatory manner in accordance with the provisions of Title VI of the Civil Rights Act of 1964, Section 504 of the Rehabilitation Act of 1973, the Age  Discrimination Act of 1975, the Americans with Disabilities Act as well as any other applicable Federal and State laws and regulations.     You have the right to a reasonable, timely response to your request or need for care, as well as the right to considerate and respectful care including an environment that preserves dignity and contributes to a positive self-image. You are responsible for being considerate and respectful of our staff.     You have a right to information regarding patient rights, advocacy services and complaint mechanisms, and the right to prompt resolution of any complaint. You or a designee has the right to participate in the resolution of ethical issues surrounding your care. You have a right to file a complaint if you feel that your rights have been infringed, without fear or penalty from [facility] or the federal government. You may file a complaint with the Director of Outpatient Case Management by calling [phone]. At any time, you may lodge a grievance with the [facility] by calling [phone], or the Joint Commission on Accreditation of Healthcare Organizations at [phone].     You, or someone acting on your behalf, have the right to understandable information on your health status, treatment and progress in order to make decisions. You have the right to know the nature, risks and alternatives to treatment. You have the right to be informed, when appropriate, regarding the outcome of the care that has been provided. You have the right to refuse treatment to the extent permitted by law, and the right to be informed of the alternatives and consequences of refusing treatment.     You, in collaboration with your physician, have the right to make decisions regarding care and the right to participate in the development and implementation of the plan of care and effective pain management. You have the right to know the name and professional status of  those responsible for the delivery of your care and treatment.       You have a right, within legal guidelines, to have a guardian, next-of-kin or legal designee exercises your patient rights when you are unable to do so. You have the right for your wishes regarding end-of-life decisions to be addressed by the healthcare team through advance directives. You have the right to personal privacy and confidentiality and to expect confidentiality of all records and communications pertaining to your care.      You have the right to receive communications about your health information confidentially. You have the right to request restrictions on the uses and disclosures of your health information. You have the right to inspect, copy, request amendments and receive an accounting of to whom we have disclosed your health information.     You have the right to be provided with interpretation services if you do not speak English; to alternative communication techniques if you are hearing or vision impaired; and to have any other resources needed on your behalf to ensure effective communication. These services are provided free of charge.     You have a right to personal safety (free from mental, physical, sexual and verbal abuse, neglect and exploitation). You have the right to access protective and advocacy services.     Advance Directives  A Patient Advocate is available to meet with patients to answer questions regarding advance directives.    Living Will  A document that outlines what medical treatment the patient does or does not want in the event the patient becomes unable to make those decisions at the appropriate time.    Durable Medical Power of   A document by which the patient designates an individual to be responsible for making medical decisions in the event the patient becomes unable to do so.    HIPAA Notice of Privacy Practices  Your medical information is governed by federal privacy laws. HIPAA  protects private medical information and how that information is disclosed. If you have a question regarding the HIPAA Notice of Privacy Practices, or if you believe your privacy rights have been violated, you may call our designated hotline at [phone].            Quality Improvement  Because we consistently strive to improve the care and service provided to our patients, we welcome your feedback. Your comments are an important part of our quality improvement process, as we like to know what we are doing right and which areas are in need of improvement. Our policy is to listen, be responsive and provide you with an appropriate and timely follow-up to your questions or concerns. Our goal is active patient and family involvement in all aspects of the care process.            Reporting a Grievance or Complaint    During your time with the [facility] team you may have a grievance or complaint with our services. Your Patients Bill of Rights gives patients, families, and caregivers the right to express concerns and grievances and the right to expect a reasonable and timely response.     Your presentation of your concerns is not viewed negatively. It is an opportunity for us to improve the quality of our care and services we provide to you.     You may report your concerns directly to your , or you can phone in a complaint to:     Director of Outpatient Case Management  [phone]    You may also send a complaint letter to:    Director of Outpatient Case Management Services  [address] [place] [id number]    Tell us the details of your complaint and provide us with a contact phone number so we can contact you to obtain additional information. We will return a call to you within two business days of our receipt of your complaint, and to request additional information as needed. If you choose to mail a letter, your complaint may take a few days longer to reach us.      All grievances will be addressed as quickly as possible. A grievance or complaint that involves situations or practices which place patients in immediate danger will be addressed as an urgent matter. We will work to resolve all other complaints within seven days of receipt. By that time, you will receive a phone call with either the resolution of your complaint, or a plan for corrective action. A formal written response will be sent to you within 30 days of receipt of your grievance.     If a resolution cannot be completed within 30 days, a letter will be sent to you or your family member with an estimated time for the final response.    Additionally, all patients have the right to file complaints with external agencies, without exception. Complaints/grievances can be addressed to the following agencies:            Patient Safety or Quality of Care Concerns  Office of Quality Monitoring   [facility] [place] [address]  [phone] Toll Free    HIPPA Privacy/Security Concerns  Office for Civil Rights Region IV  [place] Department of Health & Human Services  [address], [address]  [place] [address] [phone] Phone  [phone] TDD  [phone] Toll Free    Medicare/Medicaid Billing Concerns  [place] for Medicare & Medicaid Services  Region 6  [address], [address]  [place] [address] [phone] Phone  [phone] Toll Free    General Concerns  [place] [facility] ([facility])  [phone] Toll Free Complaint Hotline                                      Consent Form/Release of Information    By signing--     (1) I agree I have read the Outpatient Case Management information provided to me;     (2) I agree to voluntarily participate in the Outpatient Case Management program;     (3) I understand I must consent to participation in the Outpatient Case Management program during my first interview with my Case  Manager;    (4) I consent to the discussion and release of my personal health information to my healthcare team (including my personal physician, my medical home care team, any specialty physician(s), and my [facility] team);     (5) I agree my consent is valid for the length of time I am receiving Outpatient Case Management;    (6) I agree to referrals to community resources which my Case Management team recommends for me. I agree to the release of my personal information and personal health information as necessary to referral sources.    ___________________________________________________________________  Patients Printed Name     ___________________________________________________________________  Patients Signature       Date    If patient is in being cared for, please complete this section:     ___________________________________________________________________  Printed Name of Person Caring For Patient   Relationship To Patient    ___________________________________________________________________   Signature of Person Caring For Patient     Date    PLEASE SIGN AND RETURN IN THE ENCLOSED PRE-ADDRESSED ENVELOPE.

## 2017-09-18 NOTE — PROGRESS NOTES
Subjective:       Patient ID: Ivy Salgado is a 37 y.o. female.    Chief Complaint: Crohn's Disease    HPI  Review of Systems   Constitutional: Positive for activity change, appetite change, fatigue, fever and unexpected weight change. Negative for chills and diaphoresis.   HENT: Negative for congestion, ear pain, mouth sores, nosebleeds, postnasal drip, rhinorrhea, sinus pressure, sore throat, trouble swallowing and voice change.    Eyes: Negative for pain.   Respiratory: Negative for cough, shortness of breath and wheezing.    Cardiovascular: Negative for chest pain, palpitations and leg swelling.   Genitourinary: Positive for difficulty urinating and dysuria. Negative for flank pain, hematuria and menstrual problem.   Musculoskeletal: Negative for arthralgias, back pain, gait problem, joint swelling, myalgias and neck pain.   Skin: Positive for rash.   Neurological: Negative for dizziness, tremors, syncope, numbness and headaches.   Hematological: Negative for adenopathy. Does not bruise/bleed easily.   Psychiatric/Behavioral: Positive for sleep disturbance. Negative for agitation, behavioral problems, confusion, decreased concentration and dysphoric mood. The patient is not nervous/anxious.        Objective:      Physical Exam   Constitutional: She is oriented to person, place, and time. She appears well-developed and well-nourished. No distress.   HENT:   Head: Normocephalic and atraumatic.   Right Ear: External ear normal.   Left Ear: External ear normal.   Nose: Nose normal.   Mouth/Throat: Oropharynx is clear and moist. No oropharyngeal exudate.   Eyes: Conjunctivae are normal. Pupils are equal, round, and reactive to light. No scleral icterus.   Neck: Normal range of motion. Neck supple. No thyromegaly present.   Cardiovascular: Normal rate, regular rhythm, normal heart sounds and intact distal pulses.  Exam reveals no gallop.    No murmur heard.  Pulmonary/Chest: Effort normal and breath sounds normal.  She has no wheezes. She has no rales.   Abdominal: Soft. Normal appearance and bowel sounds are normal. She exhibits no shifting dullness, no distension, no fluid wave, no abdominal bruit and no mass. There is no hepatosplenomegaly. There is generalized tenderness.   Musculoskeletal: Normal range of motion. She exhibits no edema or tenderness.   Lymphadenopathy:     She has no cervical adenopathy.   Neurological: She is alert and oriented to person, place, and time. No cranial nerve deficit.   Skin: Skin is warm and dry. No rash noted.   Psychiatric: She has a normal mood and affect. Her behavior is normal. Judgment and thought content normal.       Assessment:       1. Crohn's disease of both small and large intestine with complication    2. Encounter for long-term (current) use of high-risk medication        Plan:         HISTORY OF PRESENT ILLNESS:  This is a followup visit for Ivy.  Since her   last visit with me in April, I have had a number of encounters for whole   management.    This past spring we stopped the Cimzia and started Stelara.  This was based on   increased symptoms and a MR enterography that suggested active small bowel Crohn   disease.  She has only had one dose of the Stelara.  When she was due for her   next dose, she developed a skin rash that they initially thought was shingles.    Later on, it spread to the other cheek and now she is getting workup in   Rheumatology for this facial rash, but is not felt to be shingles, but she has   not gotten her second dose of the Stelara yet.    She continues with a wide variety of symptoms.  She is having a high output of   watery stool from her ileostomy.  She has no appetite.  She has nausea.  She has   several types of abdominal pain, upper abdominal pain as well as a lower   cramping abdominal pain.  She is having increased reflux symptoms despite   over-the-counter Prilosec.  She has fatigue, early satiety and weight loss.    She had a number of  labs since I have seen her.  She has also had ileoscopy   while she was in the hospital.  She was in the hospital for a prolonged period   of time as well.  The ileoscopy was normal to about 20 cm in the ileum and that   it did not show any active Crohn's.  She also had a CAT scan in , which did   not suggest any active IBD in contrast to the April MR enterography.    ASSESSMENT AND DECISION MAKIN.  Crohn disease.  It is a very difficult case.  A very complicated case.    Prior colectomy with status post ileostomy.  We have already gone through a   number of medications now.  We are trying Stelara.  I want her try to get her   back on the Stelara and give that a good try to find out whether or not it works   for any of her symptoms.  I do not see any reason to repeat any of the imaging   like the MRI right now.  I think I probably will repeat that in six months.    Consider EGD now, but I would like to get her on Zantac 150 twice a day.  If for   the next month that did not help her epigastric pain and reflux, then I will   set up an EGD.  She asked about Marinol for the poor appetite and weight loss   and I will look into that.  We did discuss the use of cannabis products for the   nausea as well.  Her symptoms have been so severe, she has had to take a leave   of absence of work and that is very difficult as she is a hard worker in   general.    RECOMMENDATIONS:  1.  Restart Stelara.  2.  Continue nausea medications.  3.  Zantac 150 twice a day.  4.  Marinol, a prescription for that.  5.  Return visit here in the clinic in three months but stay in contact.    This is a one-hour appointment with greater than half the time face-to-face   counseling with considerable coordination of care.      FAUSTO  dd: 2017 08:33:20 (CDT)  td: 2017 10:07:24 (CDT)  Doc ID   #[id number]  Job ID #[id number]    CC:

## 2017-09-19 ENCOUNTER — PATIENT MESSAGE (OUTPATIENT)
Dept: GASTROENTEROLOGY | Facility: CLINIC | Age: 37
End: 2017-09-19

## 2017-09-19 ENCOUNTER — INFUSION (OUTPATIENT)
Dept: INFECTIOUS DISEASES | Facility: HOSPITAL | Age: 37
End: 2017-09-19
Attending: INTERNAL MEDICINE
Payer: COMMERCIAL

## 2017-09-19 VITALS
WEIGHT: 98.13 LBS | TEMPERATURE: 99 F | SYSTOLIC BLOOD PRESSURE: 139 MMHG | HEART RATE: 85 BPM | BODY MASS INDEX: 18.53 KG/M2 | HEIGHT: 61 IN | DIASTOLIC BLOOD PRESSURE: 65 MMHG

## 2017-09-19 DIAGNOSIS — K50.819 CROHN'S DISEASE OF SMALL AND LARGE INTESTINES WITH COMPLICATION: Primary | ICD-10-CM

## 2017-09-19 PROCEDURE — 96361 HYDRATE IV INFUSION ADD-ON: CPT

## 2017-09-19 PROCEDURE — 25000003 PHARM REV CODE 250: Performed by: INTERNAL MEDICINE

## 2017-09-19 PROCEDURE — 96360 HYDRATION IV INFUSION INIT: CPT

## 2017-09-19 RX ORDER — ACETAMINOPHEN 325 MG/1
650 TABLET ORAL
Status: CANCELLED | OUTPATIENT
Start: 2017-09-19

## 2017-09-19 RX ORDER — DIPHENHYDRAMINE HYDROCHLORIDE 50 MG/ML
25 INJECTION INTRAMUSCULAR; INTRAVENOUS
Status: CANCELLED | OUTPATIENT
Start: 2017-09-19

## 2017-09-19 RX ORDER — EPINEPHRINE 1 MG/ML
0.3 INJECTION, SOLUTION, CONCENTRATE INTRAVENOUS
Status: CANCELLED | OUTPATIENT
Start: 2017-09-19

## 2017-09-19 RX ORDER — IPRATROPIUM BROMIDE AND ALBUTEROL SULFATE 2.5; .5 MG/3ML; MG/3ML
3 SOLUTION RESPIRATORY (INHALATION)
Status: CANCELLED | OUTPATIENT
Start: 2017-09-19

## 2017-09-19 RX ADMIN — SODIUM CHLORIDE 2000 ML: 0.9 INJECTION, SOLUTION INTRAVENOUS at 10:09

## 2017-09-20 ENCOUNTER — PATIENT MESSAGE (OUTPATIENT)
Dept: GASTROENTEROLOGY | Facility: CLINIC | Age: 37
End: 2017-09-20

## 2017-09-20 RX ORDER — ZOLPIDEM TARTRATE 10 MG/1
TABLET ORAL
Qty: 30 TABLET | Refills: 0 | Status: SHIPPED | OUTPATIENT
Start: 2017-09-20 | End: 2017-10-17 | Stop reason: SDUPTHER

## 2017-09-20 RX ORDER — ALPRAZOLAM 0.5 MG/1
0.5 TABLET ORAL 2 TIMES DAILY
Qty: 60 TABLET | Refills: 0 | Status: SHIPPED | OUTPATIENT
Start: 2017-09-20 | End: 2017-10-17 | Stop reason: SDUPTHER

## 2017-09-21 ENCOUNTER — TELEPHONE (OUTPATIENT)
Dept: GASTROENTEROLOGY | Facility: CLINIC | Age: 37
End: 2017-09-21

## 2017-09-21 ENCOUNTER — PATIENT MESSAGE (OUTPATIENT)
Dept: INTERNAL MEDICINE | Facility: CLINIC | Age: 37
End: 2017-09-21

## 2017-09-21 ENCOUNTER — PATIENT MESSAGE (OUTPATIENT)
Dept: GASTROENTEROLOGY | Facility: CLINIC | Age: 37
End: 2017-09-21

## 2017-09-21 ENCOUNTER — TELEPHONE (OUTPATIENT)
Dept: PHARMACY | Facility: CLINIC | Age: 37
End: 2017-09-21

## 2017-09-21 DIAGNOSIS — K50.819 CROHN'S DISEASE OF SMALL AND LARGE INTESTINES WITH COMPLICATION: ICD-10-CM

## 2017-09-21 RX ORDER — OXYCODONE AND ACETAMINOPHEN 10; 325 MG/1; MG/1
1 TABLET ORAL EVERY 6 HOURS PRN
Qty: 40 TABLET | Refills: 0 | Status: SHIPPED | OUTPATIENT
Start: 2017-09-21 | End: 2017-10-10 | Stop reason: SDUPTHER

## 2017-09-21 NOTE — TELEPHONE ENCOUNTER
----- Message from Kate Horton MA sent at 9/21/2017 12:37 PM CDT -----  Contact: Nakul Bolton  friend    or  self  [phone]  States he is calling regarding the forms (2) he e-mailed that need to be filled out today so she can pick them up today.

## 2017-09-21 NOTE — PROGRESS NOTES
pt's left subclavian medi port accessed without incidence,  Pt tolerated normal saline bolus( one liter) without incidence, medi port locked with Heparin flush 500 Units/5ml, pt deaccessed, no bleeding noted to site upon removal, band aid in place   no

## 2017-09-21 NOTE — TELEPHONE ENCOUNTER
----- Message from Quiana Kermit sent at 9/21/2017  2:20 PM CDT -----  Contact: self - [phone]  Cody - re her prior auth on marinol needs to be faxed to  [facility] - please call patient at [phone]

## 2017-09-22 ENCOUNTER — PATIENT MESSAGE (OUTPATIENT)
Dept: INTERNAL MEDICINE | Facility: CLINIC | Age: 37
End: 2017-09-22

## 2017-09-25 ENCOUNTER — OUTPATIENT CASE MANAGEMENT (OUTPATIENT)
Dept: ADMINISTRATIVE | Facility: OTHER | Age: 37
End: 2017-09-25

## 2017-09-25 NOTE — PROGRESS NOTES
9/25/2017:   spoke to patient for follow up.  She has completed the applications for the Employee Emergency Assistance Fund, Patient assistance fund, food stamps, and government disability benefits.  She stated that she has received 1 check for $42 from short term disability.  She is working with Human Resources to try and get short term payments.    Pt also reported that she has hired a  to assist with Government Disability application. She feels that this process is progressing.      Her father was taking her to buy groceries today.      Plan for next encounter:  1. Enquire about application status  2. Answer any questions  3. Assess for additional needs

## 2017-09-26 ENCOUNTER — PATIENT MESSAGE (OUTPATIENT)
Dept: GASTROENTEROLOGY | Facility: CLINIC | Age: 37
End: 2017-09-26

## 2017-09-27 LAB
MI2 AB SER QL: NEGATIVE
MI2 AB SER QL: NEGATIVE

## 2017-09-29 ENCOUNTER — OUTPATIENT CASE MANAGEMENT (OUTPATIENT)
Dept: ADMINISTRATIVE | Facility: OTHER | Age: 37
End: 2017-09-29

## 2017-09-29 NOTE — PROGRESS NOTES
09/29/2017 (1st Attempt)  RN-CM attempted to follow up for Outpatient Care Management; left message requesting return call. At this point, patient's nursing needs have been met. This RN-CM will continue to monitor and coordinate care with [facility] , as SW is still working with patient. Jabier Parsons, RN-OPCM

## 2017-10-02 ENCOUNTER — OFFICE VISIT (OUTPATIENT)
Dept: OBSTETRICS AND GYNECOLOGY | Facility: CLINIC | Age: 37
End: 2017-10-02
Payer: COMMERCIAL

## 2017-10-02 ENCOUNTER — LAB VISIT (OUTPATIENT)
Dept: LAB | Facility: HOSPITAL | Age: 37
End: 2017-10-02
Attending: OBSTETRICS & GYNECOLOGY
Payer: COMMERCIAL

## 2017-10-02 VITALS
WEIGHT: 95.25 LBS | SYSTOLIC BLOOD PRESSURE: 90 MMHG | BODY MASS INDEX: 17.98 KG/M2 | DIASTOLIC BLOOD PRESSURE: 58 MMHG | HEIGHT: 61 IN

## 2017-10-02 DIAGNOSIS — Z11.3 SCREEN FOR STD (SEXUALLY TRANSMITTED DISEASE): ICD-10-CM

## 2017-10-02 DIAGNOSIS — Z01.419 WELL WOMAN EXAM WITH ROUTINE GYNECOLOGICAL EXAM: Primary | ICD-10-CM

## 2017-10-02 DIAGNOSIS — Z01.419 WELL WOMAN EXAM WITH ROUTINE GYNECOLOGICAL EXAM: ICD-10-CM

## 2017-10-02 DIAGNOSIS — Z12.4 PAP SMEAR FOR CERVICAL CANCER SCREENING: ICD-10-CM

## 2017-10-02 PROCEDURE — 88141 CYTOPATH C/V INTERPRET: CPT | Mod: ,,, | Performed by: PATHOLOGY

## 2017-10-02 PROCEDURE — 87660 TRICHOMONAS VAGIN DIR PROBE: CPT

## 2017-10-02 PROCEDURE — 88175 CYTOPATH C/V AUTO FLUID REDO: CPT | Performed by: PATHOLOGY

## 2017-10-02 PROCEDURE — 87480 CANDIDA DNA DIR PROBE: CPT

## 2017-10-02 PROCEDURE — 86592 SYPHILIS TEST NON-TREP QUAL: CPT

## 2017-10-02 PROCEDURE — 87591 N.GONORRHOEAE DNA AMP PROB: CPT

## 2017-10-02 PROCEDURE — 87340 HEPATITIS B SURFACE AG IA: CPT

## 2017-10-02 PROCEDURE — 86803 HEPATITIS C AB TEST: CPT

## 2017-10-02 PROCEDURE — 99395 PREV VISIT EST AGE 18-39: CPT | Mod: S$GLB,,, | Performed by: OBSTETRICS & GYNECOLOGY

## 2017-10-02 PROCEDURE — 86703 HIV-1/HIV-2 1 RESULT ANTBDY: CPT

## 2017-10-02 PROCEDURE — 99999 PR PBB SHADOW E&M-EST. PATIENT-LVL III: CPT | Mod: PBBFAC,,, | Performed by: OBSTETRICS & GYNECOLOGY

## 2017-10-02 PROCEDURE — 36415 COLL VENOUS BLD VENIPUNCTURE: CPT

## 2017-10-02 RX ORDER — TACROLIMUS 1 MG/G
1 OINTMENT TOPICAL 2 TIMES DAILY PRN
Refills: 4 | COMMUNITY
Start: 2017-09-06 | End: 2018-02-22

## 2017-10-02 NOTE — PROGRESS NOTES
OBSTETRIC HISTORY:   OB History      Para Term  AB Living    2 1 1 0 1 1    SAB TAB Ectopic Multiple Live Births    1 0 0 0 1         COMPREHENSIVE GYN HISTORY:  PAP History: Reports abnormal Paps. Date:  and  Treatment: Colposcopy and CKC Result of Colpo 10/3/12: ECC: Negative ECTO: Koilocytosis (aka LYLA 1)  Pap #2/3: Date:  Result: Normal  Pap #1/3: Date: 13 Result: LSIL  Infection History: Reports STDs: Herpes. Denies PID.  Benign History: Denies uterine fibroids. Reports ovarian cysts. Denies endometriosis.   Cancer History: Denies cervical cancer. Denies uterine cancer or hyperplasia. Denies ovarian cancer. Denies vulvar cancer or pre-cancer. Denies vaginal cancer or pre-cancer. Denies breast cancer. Denies colon cancer.  Sexual Activity History:  reports that she currently engages in sexual activity. She reports using the following methods of birth control/protection: Pill and Surgical.   Menstrual History: Every 28 days, flows for 3 days. Moderate flow.  Dysmenorrhea History: Reports severe dysmenorrhea.  Contraception: Hysterectomy          HPI:   37 y.o.  Patient's last menstrual period was 2015.    Patient is  here for her annual gynecologic exam.  She has gyn complaints of vaginal discharge and wants STD testing. She denies bladder and breast complaints.    ROS:  GENERAL: Denies weight gain or weight loss. Feeling well overall.   SKIN: Denies rash or lesions.   HEAD: Denies headache.   NODES: Denies enlarged lymph nodes.   CHEST: Denies shortness of breath.   ABDOMEN: No constipation, diarrhea, vomiting or rectal bleeding.   URINARY: No frequency, dysuria, hematuria, or burning on urination.  REPRODUCTIVE: See HPI.   BREASTS: The patient denies pain, lumps, or nipple discharge.   HEMATOLOGIC: No easy bruisability.   MUSCULOSKELETAL: Denies joint pain or back pain.   NEUROLOGIC: Denies weakness.   PSYCHIATRIC: Denies depression, anxiety or mood swings.      PE:   BP (!)  "90/58   Ht 5' 1" (1.549 m)   Wt 43.2 kg (95 lb 3.8 oz)   LMP 03/30/2015   BMI 18.00 kg/m²   APPEARANCE: Well nourished, well developed, in no acute distress.  NECK: Neck symmetric without thyromegaly.  NODES: No inguinal or cervical lymph node enlargement.  CHEST: Lungs clear to auscultation.  HEART: Regular rate and rhythm, no murmurs, rubs or gallops.  ABDOMEN: Soft. No tenderness or masses. No hernias. Ostomy bag present  BREASTS: Symmetrical, no skin changes or visible lesions. No palpable masses, nipple discharge or adenopathy bilaterally.  VULVA: No lesions. Normal female genitalia.  URETHRAL MEATUS: Normal size and location, no lesions, no prolapse.  URETHRA: No masses, tenderness, prolapse or scarring.  VAGINA: Moist and well rugated, some discharge, no significant cystocele or rectocele.  CERVIX AND UTERUS SURGICALLY ABSENT.   ADNEXA: No masses or tenderness.    PROCEDURES:  Pap smear --    Assessment:  Normal Gynecologic Exam-- stopped OCP no hot flashes  STD screening  Affirm for vaginal discharge-- try vaginal probiotics  History of abnormal pap    Plan:  Mammogram and Colonoscopy if indicated by current recommendations.  Return to clinic in one year or for any problems or complaints.    Counseling:  Patient was counseled today on A.C.S. Pap guidelines and recommendations for yearly pelvic exams and monthly self breast exams; to see her PCP for other health maintenance.Regular exercise and healthy diet.                   "

## 2017-10-03 ENCOUNTER — PATIENT MESSAGE (OUTPATIENT)
Dept: GASTROENTEROLOGY | Facility: CLINIC | Age: 37
End: 2017-10-03

## 2017-10-03 LAB
C TRACH DNA SPEC QL NAA+PROBE: NOT DETECTED
CANDIDA RRNA VAG QL PROBE: NEGATIVE
G VAGINALIS RRNA GENITAL QL PROBE: NEGATIVE
HBV SURFACE AG SERPL QL IA: NEGATIVE
HCV AB SERPL QL IA: NEGATIVE
HIV 1+2 AB+HIV1 P24 AG SERPL QL IA: NEGATIVE
N GONORRHOEA DNA SPEC QL NAA+PROBE: NOT DETECTED
RPR SER QL: NORMAL
T VAGINALIS RRNA GENITAL QL PROBE: NEGATIVE

## 2017-10-05 ENCOUNTER — INFUSION (OUTPATIENT)
Dept: INFECTIOUS DISEASES | Facility: HOSPITAL | Age: 37
End: 2017-10-05
Attending: INTERNAL MEDICINE
Payer: COMMERCIAL

## 2017-10-05 VITALS
BODY MASS INDEX: 17.65 KG/M2 | HEART RATE: 83 BPM | TEMPERATURE: 99 F | RESPIRATION RATE: 16 BRPM | SYSTOLIC BLOOD PRESSURE: 117 MMHG | WEIGHT: 93.5 LBS | HEIGHT: 61 IN | DIASTOLIC BLOOD PRESSURE: 78 MMHG

## 2017-10-05 DIAGNOSIS — K50.819 CROHN'S DISEASE OF SMALL AND LARGE INTESTINES WITH COMPLICATION: Primary | ICD-10-CM

## 2017-10-05 PROCEDURE — 96365 THER/PROPH/DIAG IV INF INIT: CPT

## 2017-10-05 PROCEDURE — 25000003 PHARM REV CODE 250: Performed by: INTERNAL MEDICINE

## 2017-10-05 RX ORDER — IPRATROPIUM BROMIDE AND ALBUTEROL SULFATE 2.5; .5 MG/3ML; MG/3ML
3 SOLUTION RESPIRATORY (INHALATION)
Status: CANCELLED | OUTPATIENT
Start: 2017-10-05

## 2017-10-05 RX ORDER — EPINEPHRINE 1 MG/ML
0.3 INJECTION, SOLUTION, CONCENTRATE INTRAVENOUS
Status: CANCELLED | OUTPATIENT
Start: 2017-10-05

## 2017-10-05 RX ORDER — DIPHENHYDRAMINE HYDROCHLORIDE 50 MG/ML
25 INJECTION INTRAMUSCULAR; INTRAVENOUS
Status: CANCELLED | OUTPATIENT
Start: 2017-10-05

## 2017-10-05 RX ORDER — ACETAMINOPHEN 325 MG/1
650 TABLET ORAL
Status: CANCELLED | OUTPATIENT
Start: 2017-10-05

## 2017-10-05 RX ADMIN — SODIUM CHLORIDE 1000 ML: 0.9 INJECTION, SOLUTION INTRAVENOUS at 01:10

## 2017-10-05 NOTE — PROGRESS NOTES
pt's left subclavian medi port accessed without incidence,  Pt tolerated normal saline bolus( pt requested just 1L today)without incidence, medi port locked with Heparin flush 500 Units/5ml, pt deaccessed, no bleeding noted to site upon removal, band aid in place

## 2017-10-06 ENCOUNTER — OUTPATIENT CASE MANAGEMENT (OUTPATIENT)
Dept: ADMINISTRATIVE | Facility: OTHER | Age: 37
End: 2017-10-06

## 2017-10-06 ENCOUNTER — PATIENT MESSAGE (OUTPATIENT)
Dept: GASTROENTEROLOGY | Facility: CLINIC | Age: 37
End: 2017-10-06

## 2017-10-06 NOTE — PROGRESS NOTES
[x] Please be sure to have a supply of water, non-perishable food items, flashlights and batteries with you in your house.   [x] Please be sure you bring all of your medications with you. Bring at least a five day supply. It is best to bring your medication bottles, in case you are displaced for a longer period of time, therefore you can get your medication refilled from your temporary location.   [] Please bring sure to bring any DME that you may need. This includes walkers, wheelchairs, shower chairs, nebulizer machine, etc.   [] If you are a diabetic- be sure to bring your glucometer and all glucometer monitoring supplies.   [x] If you have high blood pressure- be sure to bring your blood pressure cuff so you can continue to monitor.   [] If you have CHF-be sure to bring your scale so you can continue to monitor.  [] If on PEG feeding- be sure to bring tube feedings and feeding supplies.  [] If on oxygen- be sure to bring all of your oxygen supplies: Cannula, portable tanks, concentrator, etc. Also contact you Oxygen Supply Company to find out the nearest location of an oxygen supply company to where you will be located. ([employer]: [phone], [facility]: [phone], [facility] [employer]:[phone], [employer]: [phone]).   [] If you receive hemodialysis- you should already have a plan in place with your dialysis center. If you do not know where you need to evacuate to in order to be close to a dialysis center- reach out to your dialysis center for further direction. ([facility]  service line: [phone], [facility]  service line [phone])  [] If receiving treatment at an infusion center- please contact the infusion center to find out which infusion center they have a contract with. Also ask your infusion center for location of the infusion center they are in contract with, so if need be you can evacuate to that area.   Office of Emergency Preparedness Phone number:  [x]  [place]: [phone]  [] [facility]: [phone]  [] [place]: [phone]  [] [place]: [phone]  [] [place]: [phone]  [] [place]: [phone]  [] [facility]: [phone]  [] [facility]: [phone]  [] [facility] [place]: [phone]  [] [place]: [phone]  [] [facility]: [phone]  [] [place]: [phone]  [] [facility]: [phone]    [] [facility]:  [phone]   [] [facility]:  [phone]   [] [facility]:  [phone]   [] [facility]:  [phone]   [] [facility]:  [phone]   [] [facility]: [phone]   [] [facility]:  [phone]   [] [facility]:  [phone]   [] [facility]:  [phone]   [] [facility]:  [phone]   [] [facility]:  [phone]   [] [place]:  [phone]   [] [facility]:  [phone]   [] [facility]:  [phone]   [] [place]:  [phone]   [] [facility]: [phone]   [] [facility]:  [phone]   [] [facility]: [phone]   [] [facility]: [phone]

## 2017-10-09 ENCOUNTER — OUTPATIENT CASE MANAGEMENT (OUTPATIENT)
Dept: ADMINISTRATIVE | Facility: OTHER | Age: 37
End: 2017-10-09

## 2017-10-09 RX ORDER — DIPHENOXYLATE HYDROCHLORIDE AND ATROPINE SULFATE 2.5; .025 MG/1; MG/1
TABLET ORAL
Qty: 90 TABLET | Refills: 1 | Status: SHIPPED | OUTPATIENT
Start: 2017-10-09 | End: 2017-10-24 | Stop reason: SDUPTHER

## 2017-10-09 RX ORDER — SUMATRIPTAN SUCCINATE 100 MG/1
100 TABLET ORAL
Qty: 9 TABLET | Refills: 1 | Status: SHIPPED | OUTPATIENT
Start: 2017-10-09 | End: 2017-12-11 | Stop reason: SDUPTHER

## 2017-10-09 RX ORDER — METRONIDAZOLE 500 MG/1
500 TABLET ORAL 3 TIMES DAILY
Qty: 21 TABLET | Refills: 0 | Status: SHIPPED | OUTPATIENT
Start: 2017-10-09 | End: 2017-11-01

## 2017-10-09 RX ORDER — FLUCONAZOLE 100 MG/1
100 TABLET ORAL DAILY
Qty: 7 TABLET | Refills: 0 | Status: SHIPPED | OUTPATIENT
Start: 2017-10-09 | End: 2017-11-01

## 2017-10-09 RX ORDER — ONDANSETRON 8 MG/1
8 TABLET, ORALLY DISINTEGRATING ORAL EVERY 8 HOURS PRN
Qty: 30 TABLET | Refills: 0 | Status: SHIPPED | OUTPATIENT
Start: 2017-10-09 | End: 2017-10-24 | Stop reason: SDUPTHER

## 2017-10-09 RX ORDER — PROMETHAZINE HYDROCHLORIDE 25 MG/1
TABLET ORAL
Qty: 30 TABLET | Refills: 3 | Status: SHIPPED | OUTPATIENT
Start: 2017-10-09 | End: 2018-01-07 | Stop reason: SDUPTHER

## 2017-10-09 NOTE — PROGRESS NOTES
10/9/2017:   spoke to patient via telephone in order to follow up.  She was very tearful and reported that she has hired an  to assist with her financial situation and filing bankruptcy.  Pt stated that she has only received the one check from short term disability.  Pt is not sure who she should contact with [facility] HR.   contacted Dinah Reyes via IM who stated that they have been trying to get in contact with this patient without success. I called patient back and provided her with Dinah Reyes's number [phone] and Dianna Hernandez  [phone] and encouraged her to call them.  Will continue to follow.    Plan for next encounter:  1. Ensure that she has contacted    2. Assess for additional needs

## 2017-10-10 DIAGNOSIS — K50.819 CROHN'S DISEASE OF SMALL AND LARGE INTESTINES WITH COMPLICATION: ICD-10-CM

## 2017-10-10 RX ORDER — OXYCODONE AND ACETAMINOPHEN 10; 325 MG/1; MG/1
1 TABLET ORAL EVERY 6 HOURS PRN
Qty: 40 TABLET | Refills: 0 | Status: SHIPPED | OUTPATIENT
Start: 2017-10-10 | End: 2017-10-30 | Stop reason: SDUPTHER

## 2017-10-11 ENCOUNTER — PATIENT MESSAGE (OUTPATIENT)
Dept: GASTROENTEROLOGY | Facility: CLINIC | Age: 37
End: 2017-10-11

## 2017-10-11 ENCOUNTER — PATIENT MESSAGE (OUTPATIENT)
Dept: OBSTETRICS AND GYNECOLOGY | Facility: CLINIC | Age: 37
End: 2017-10-11

## 2017-10-12 ENCOUNTER — INFUSION (OUTPATIENT)
Dept: INFECTIOUS DISEASES | Facility: HOSPITAL | Age: 37
End: 2017-10-12
Attending: INTERNAL MEDICINE
Payer: COMMERCIAL

## 2017-10-12 VITALS
RESPIRATION RATE: 15 BRPM | HEART RATE: 62 BPM | BODY MASS INDEX: 17.95 KG/M2 | TEMPERATURE: 99 F | DIASTOLIC BLOOD PRESSURE: 67 MMHG | WEIGHT: 95 LBS | SYSTOLIC BLOOD PRESSURE: 125 MMHG | OXYGEN SATURATION: 98 %

## 2017-10-12 DIAGNOSIS — K50.819 CROHN'S DISEASE OF SMALL AND LARGE INTESTINES WITH COMPLICATION: Primary | ICD-10-CM

## 2017-10-12 PROCEDURE — 96366 THER/PROPH/DIAG IV INF ADDON: CPT

## 2017-10-12 PROCEDURE — 25000003 PHARM REV CODE 250: Performed by: INTERNAL MEDICINE

## 2017-10-12 PROCEDURE — 96365 THER/PROPH/DIAG IV INF INIT: CPT

## 2017-10-12 RX ORDER — EPINEPHRINE 1 MG/ML
0.3 INJECTION, SOLUTION, CONCENTRATE INTRAVENOUS
Status: CANCELLED | OUTPATIENT
Start: 2017-10-12

## 2017-10-12 RX ORDER — ACETAMINOPHEN 325 MG/1
650 TABLET ORAL
Status: CANCELLED | OUTPATIENT
Start: 2017-10-12

## 2017-10-12 RX ORDER — IPRATROPIUM BROMIDE AND ALBUTEROL SULFATE 2.5; .5 MG/3ML; MG/3ML
3 SOLUTION RESPIRATORY (INHALATION)
Status: CANCELLED | OUTPATIENT
Start: 2017-10-12

## 2017-10-12 RX ORDER — DIPHENHYDRAMINE HYDROCHLORIDE 50 MG/ML
25 INJECTION INTRAMUSCULAR; INTRAVENOUS
Status: CANCELLED | OUTPATIENT
Start: 2017-10-12

## 2017-10-12 RX ADMIN — SODIUM CHLORIDE 2000 ML: 0.9 INJECTION, SOLUTION INTRAVENOUS at 08:10

## 2017-10-12 NOTE — PROGRESS NOTES
pt's left subclavian medi port accessed without incidence,  Pt tolerated normal saline bolus 2 Liter bolus without incidence, medi port locked with Heparin flush 500 Units/5ml, pt deaccessed, no bleeding noted to site upon removal, band aid in place

## 2017-10-16 ENCOUNTER — PATIENT MESSAGE (OUTPATIENT)
Dept: GASTROENTEROLOGY | Facility: CLINIC | Age: 37
End: 2017-10-16

## 2017-10-16 ENCOUNTER — PATIENT MESSAGE (OUTPATIENT)
Dept: INTERNAL MEDICINE | Facility: CLINIC | Age: 37
End: 2017-10-16

## 2017-10-16 ENCOUNTER — OFFICE VISIT (OUTPATIENT)
Dept: INTERNAL MEDICINE | Facility: CLINIC | Age: 37
End: 2017-10-16
Payer: COMMERCIAL

## 2017-10-16 VITALS
TEMPERATURE: 101 F | BODY MASS INDEX: 17.27 KG/M2 | HEIGHT: 61 IN | SYSTOLIC BLOOD PRESSURE: 118 MMHG | DIASTOLIC BLOOD PRESSURE: 70 MMHG | HEART RATE: 118 BPM | OXYGEN SATURATION: 98 % | WEIGHT: 91.5 LBS

## 2017-10-16 DIAGNOSIS — N12 PYELONEPHRITIS: Primary | ICD-10-CM

## 2017-10-16 LAB
BACTERIA #/AREA URNS HPF: ABNORMAL /HPF
BILIRUB UR QL STRIP: NEGATIVE
CLARITY UR: ABNORMAL
COLOR UR: YELLOW
GLUCOSE UR QL STRIP: NEGATIVE
HGB UR QL STRIP: ABNORMAL
HYALINE CASTS #/AREA URNS LPF: 0 /LPF
KETONES UR QL STRIP: NEGATIVE
LEUKOCYTE ESTERASE UR QL STRIP: ABNORMAL
MICROSCOPIC COMMENT: ABNORMAL
NITRITE UR QL STRIP: POSITIVE
PH UR STRIP: 6 [PH] (ref 5–8)
PROT UR QL STRIP: ABNORMAL
RBC #/AREA URNS HPF: 11 /HPF (ref 0–4)
SP GR UR STRIP: 1.02 (ref 1–1.03)
SQUAMOUS #/AREA URNS HPF: 2 /HPF
URN SPEC COLLECT METH UR: ABNORMAL
UROBILINOGEN UR STRIP-ACNC: NEGATIVE EU/DL
WBC #/AREA URNS HPF: >100 /HPF (ref 0–5)

## 2017-10-16 PROCEDURE — 87186 SC STD MICRODIL/AGAR DIL: CPT

## 2017-10-16 PROCEDURE — 99214 OFFICE O/P EST MOD 30 MIN: CPT | Mod: 25,S$GLB,, | Performed by: INTERNAL MEDICINE

## 2017-10-16 PROCEDURE — 87077 CULTURE AEROBIC IDENTIFY: CPT

## 2017-10-16 PROCEDURE — 87088 URINE BACTERIA CULTURE: CPT

## 2017-10-16 PROCEDURE — 96372 THER/PROPH/DIAG INJ SC/IM: CPT | Mod: S$GLB,,, | Performed by: INTERNAL MEDICINE

## 2017-10-16 PROCEDURE — 99999 PR PBB SHADOW E&M-EST. PATIENT-LVL IV: CPT | Mod: PBBFAC,,, | Performed by: INTERNAL MEDICINE

## 2017-10-16 PROCEDURE — 81000 URINALYSIS NONAUTO W/SCOPE: CPT

## 2017-10-16 PROCEDURE — 87086 URINE CULTURE/COLONY COUNT: CPT

## 2017-10-16 RX ORDER — CEFTRIAXONE 1 G/1
1 INJECTION, POWDER, FOR SOLUTION INTRAMUSCULAR; INTRAVENOUS
Status: COMPLETED | OUTPATIENT
Start: 2017-10-16 | End: 2017-10-16

## 2017-10-16 RX ORDER — SULFAMETHOXAZOLE AND TRIMETHOPRIM 800; 160 MG/1; MG/1
1 TABLET ORAL 2 TIMES DAILY
Qty: 20 TABLET | Refills: 0 | Status: SHIPPED | OUTPATIENT
Start: 2017-10-16 | End: 2017-11-01

## 2017-10-16 RX ADMIN — CEFTRIAXONE 1 G: 1 INJECTION, POWDER, FOR SOLUTION INTRAMUSCULAR; INTRAVENOUS at 11:10

## 2017-10-16 NOTE — PROGRESS NOTES
"Ceftriaxone 1000mg IM injection ordered by Dr. Astorga. Prior to administration, the patient's allergies were reviewed. The area of injection was identified in the upper outer quadrant of the right upper outer quadrant. This area was cleaned with alcohol. Using a 22g 1.5" safety needle, 1mL of a solution containing ceftriaxone 1000mg reconstituted with 1mL of lidocaine 1% (mixed prior to administration) was placed into the muscle. The injection site was dressed with a bandage. Patient experienced no complications and was discharged in stable condition. Patient was notified to apply ice if they experienced any discomfort at the injection site. Ceftriaxone 1000mg Lot: [id number] Exp: [id number], Lidocaine 1% Lot: [id number], Exp:01/2018    "

## 2017-10-16 NOTE — PROGRESS NOTES
Subjective:       Patient ID: Ivy Salgado is a 37 y.o. female.    Chief Complaint: Flank Pain; Fever; Dysuria; and Dizziness    Pt c/o 3 days of L flank pain and 2 days of fever. She has temp of 101 here today. She is having urinary urgency but no hematuria. Some nausea but no vomiting. No abd pain. Her ostomy output is watery but unchanged. She has had recurrent UTIs and felt to be due to urinary retention. She has had eval by urology but has not yet completed evaluation. Prior cultures reviewed. She took AZO last night.       Review of Systems   Constitutional: Positive for fever.   Respiratory: Negative for shortness of breath.    Cardiovascular: Negative for chest pain.   Gastrointestinal: Positive for nausea. Negative for abdominal pain and vomiting.   Genitourinary: Positive for dysuria, flank pain, frequency and urgency.   Psychiatric/Behavioral: Negative for dysphoric mood.       Objective:      Physical Exam   Constitutional: She is oriented to person, place, and time. She appears well-developed and well-nourished.   Neck: Neck supple. No thyromegaly present.   Cardiovascular: Regular rhythm and normal heart sounds.    Mild tachycardia   Pulmonary/Chest: Effort normal and breath sounds normal.   Abdominal: Soft. Bowel sounds are normal. She exhibits no distension. There is no tenderness. There is CVA tenderness.   L CVA tenderness   Lymphadenopathy:     She has no cervical adenopathy.   Neurological: She is alert and oriented to person, place, and time.   Psychiatric: She has a normal mood and affect. Her behavior is normal.   Vitals reviewed.      Assessment:       1. Pyelonephritis        Plan:       1. UA and culture  2. Rocephin 1gm IM now  3. Bactrim  4. F/u urology  5. ED prompts d/w pt and mom and they understood

## 2017-10-17 RX ORDER — ZOLPIDEM TARTRATE 10 MG/1
TABLET ORAL
Qty: 30 TABLET | Refills: 0 | Status: SHIPPED | OUTPATIENT
Start: 2017-10-17 | End: 2017-11-14 | Stop reason: SDUPTHER

## 2017-10-17 RX ORDER — ALPRAZOLAM 0.5 MG/1
0.5 TABLET ORAL 2 TIMES DAILY
Qty: 60 TABLET | Refills: 0 | Status: SHIPPED | OUTPATIENT
Start: 2017-10-17 | End: 2017-11-14 | Stop reason: SDUPTHER

## 2017-10-18 ENCOUNTER — TELEPHONE (OUTPATIENT)
Dept: PHARMACY | Facility: CLINIC | Age: 37
End: 2017-10-18

## 2017-10-19 ENCOUNTER — PATIENT MESSAGE (OUTPATIENT)
Dept: INTERNAL MEDICINE | Facility: CLINIC | Age: 37
End: 2017-10-19

## 2017-10-19 LAB — BACTERIA UR CULT: NORMAL

## 2017-10-23 ENCOUNTER — OFFICE VISIT (OUTPATIENT)
Dept: UROLOGY | Facility: CLINIC | Age: 37
End: 2017-10-23
Attending: UROLOGY
Payer: COMMERCIAL

## 2017-10-23 VITALS
HEIGHT: 61 IN | HEART RATE: 88 BPM | WEIGHT: 91.5 LBS | BODY MASS INDEX: 17.27 KG/M2 | DIASTOLIC BLOOD PRESSURE: 68 MMHG | SYSTOLIC BLOOD PRESSURE: 102 MMHG

## 2017-10-23 DIAGNOSIS — Z87.440 HISTORY OF RECURRENT UTIS: ICD-10-CM

## 2017-10-23 DIAGNOSIS — N30.00 ACUTE CYSTITIS WITHOUT HEMATURIA: Primary | ICD-10-CM

## 2017-10-23 DIAGNOSIS — R33.9 URINARY RETENTION: ICD-10-CM

## 2017-10-23 DIAGNOSIS — N20.0 NEPHROLITHIASIS: ICD-10-CM

## 2017-10-23 PROCEDURE — 99214 OFFICE O/P EST MOD 30 MIN: CPT | Mod: 25,S$GLB,, | Performed by: UROLOGY

## 2017-10-23 PROCEDURE — 87086 URINE CULTURE/COLONY COUNT: CPT

## 2017-10-23 PROCEDURE — 81002 URINALYSIS NONAUTO W/O SCOPE: CPT | Mod: S$GLB,,, | Performed by: UROLOGY

## 2017-10-23 NOTE — PROGRESS NOTES
"Subjective:      Ivy Salgado is a 37 y.o. female who returns today regarding her UTIs.    She had febrile UTI last week, treated by Dr. Seo. She had left flank pain then that is now resolved. She also passed a stone last week and does have prior h/o nephrolithiasis. She states her pain and fever are now improved though she still does not feel well overall, she thinks likely 2/2 her Crohn's disease.     She was getting low volumes when she was doing CIC so she has since stopped.    The following portions of the patient's history were reviewed and updated as appropriate: allergies, current medications, past family history, past medical history, past social history, past surgical history and problem list.    Review of Systems  A comprehensive multipoint review of systems was negative except as otherwise stated in the HPI.     Objective:   Vitals: /68 (BP Location: Right arm, Patient Position: Sitting, BP Method: Large (Automatic))   Pulse 88   Ht 5' 1" (1.549 m)   Wt 41.5 kg (91 lb 7.9 oz)   LMP 03/30/2015   BMI 17.29 kg/m²     Physical Exam   General: alert and oriented, no acute distress  Respiratory: Symmetric expansion, non-labored breathing  Neuro: no gross deficits  Psych: normal judgment and insight, normal mood/affect and non-anxious    Bladder Scan PVR: 130cc      Lab Review   Urinalysis demonstrates negative for all components  Lab Results   Component Value Date    WBC 6.62 09/12/2017    HGB 12.0 09/12/2017    HCT 36.3 (L) 09/12/2017    MCV 91 09/12/2017     09/12/2017     Lab Results   Component Value Date    CREATININE 0.7 09/12/2017    BUN 6 09/12/2017       Imaging   Reviewed images from CT (w/ contrast) in June and in October 2016. Both of these show small renal stones with a clear renal stone on left last year.    Assessment and Plan:   1. Acute cystitis without hematuria  -- Finish abx  -- Repeat culture due to persistent symptoms    2. History of recurrent UTIs  -- May be " related to stones (see below) or retention  -- Will monitor further for now    3. Urinary retention  -- Improved since last visit so will defer SUDS for now  -- May need to restart CIC once daily for residual if UTIs persist    4. Nephrolithiasis  -- Passed stone may have been contributing to infections  -- Will consider repeat imaging in the future if UTIs persist  -- Complete metabolic workup w/ 24 hour urine    FU as scheduled in November

## 2017-10-24 ENCOUNTER — PATIENT MESSAGE (OUTPATIENT)
Dept: GASTROENTEROLOGY | Facility: CLINIC | Age: 37
End: 2017-10-24

## 2017-10-24 LAB
BILIRUB SERPL-MCNC: NORMAL MG/DL
BLOOD URINE, POC: NORMAL
COLOR, POC UA: YELLOW
GLUCOSE UR QL STRIP: NORMAL
KETONES UR QL STRIP: NORMAL
LEUKOCYTE ESTERASE URINE, POC: NORMAL
NITRITE, POC UA: NORMAL
PH, POC UA: 6
PROTEIN, POC: NORMAL
SPECIFIC GRAVITY, POC UA: 1
UROBILINOGEN, POC UA: NORMAL

## 2017-10-24 RX ORDER — DIPHENOXYLATE HYDROCHLORIDE AND ATROPINE SULFATE 2.5; .025 MG/1; MG/1
TABLET ORAL
Qty: 90 TABLET | Refills: 1 | Status: SHIPPED | OUTPATIENT
Start: 2017-10-24 | End: 2017-11-13 | Stop reason: SDUPTHER

## 2017-10-24 RX ORDER — ONDANSETRON 8 MG/1
8 TABLET, ORALLY DISINTEGRATING ORAL EVERY 8 HOURS PRN
Qty: 30 TABLET | Refills: 0 | Status: SHIPPED | OUTPATIENT
Start: 2017-10-24 | End: 2017-11-13 | Stop reason: SDUPTHER

## 2017-10-25 LAB — BACTERIA UR CULT: NO GROWTH

## 2017-10-27 ENCOUNTER — OUTPATIENT CASE MANAGEMENT (OUTPATIENT)
Dept: ADMINISTRATIVE | Facility: OTHER | Age: 37
End: 2017-10-27

## 2017-10-27 NOTE — PROGRESS NOTES
10/27/2017  RNNINA contacted patient along with OPCM-SWJackson. Spoke with patient who states that she has had multiple complications in the last couple of weeks to include a UTI, Kidney Infection and Kidney Stones for which she is still on antibiotics. She reports that she also had a PAP smear done which showed some low grade cells and her GYN is recommending a colposcopy exam to make sure that everything is okay. She states that her GYN thinks that the low grade cells are likely due to her decreased immune system, but wants to do the colposcopy just to make sure. Patient states that she completed all the applications that were sent to her about 1 month ago, but has not heard back from anyone as to whether she qualifies for the Patient Assistance Fund. She states that the co-pays for all the doctor appointments are adding up, in addition to the balance on her account, which she had to stop paying on because she has zero income at this time. She states that she has spoken to  and was told that she does not qualify for disability from [employer] because she worked for 10 years and left for 3 months, then returned to work. She reports that she was told she is 20 days short of working a full year in order to receive the benefit. Patient reports that she is currently moving into her parent's home as she cannot afford to live on her own with no income.  She has hired an  to assist with disability appeals.  sent a message to Jairo Dennison with the Financial department to inquire about the status of the Patient Financial Assistance application that was filled out with the  about 1 month ago. Will continue to follow with SW and coordinate care as needed.      Follow Up Plan:  - 3 way call with SW to follow up with Financial Assistance.  - Assess for additional needs.

## 2017-10-27 NOTE — PROGRESS NOTES
10/27/2017:  Pt has spoken to  and was told that she does not qualify for disability from [facility] because she worked for 10 years and left for 3 months then returned to work.  She is 20 days short of working a full year in order to receive the benefit.    Pt reported that she is currently moving into her parent's home.  She has hired an  to assist with disability applications/appeals.  She also reported that she has completed all of the applications that were sent to her.  She does not know if she will qualify for Patient Assistance Fund.   sent a message to Jairo Dennison with the Financial department to inquire about the status of application.  Will continue to follow.      Plan for next encounter:  1. Follow up with Financial Assistance   2. Assess for additional needs

## 2017-10-30 DIAGNOSIS — K50.819 CROHN'S DISEASE OF SMALL AND LARGE INTESTINES WITH COMPLICATION: ICD-10-CM

## 2017-10-30 RX ORDER — OXYCODONE AND ACETAMINOPHEN 10; 325 MG/1; MG/1
1 TABLET ORAL EVERY 6 HOURS PRN
Qty: 40 TABLET | Refills: 0 | Status: SHIPPED | OUTPATIENT
Start: 2017-10-30 | End: 2017-12-12 | Stop reason: SDUPTHER

## 2017-11-01 ENCOUNTER — OFFICE VISIT (OUTPATIENT)
Dept: OBSTETRICS AND GYNECOLOGY | Facility: CLINIC | Age: 37
End: 2017-11-01
Payer: COMMERCIAL

## 2017-11-01 VITALS — WEIGHT: 92.81 LBS | BODY MASS INDEX: 17.52 KG/M2 | HEIGHT: 61 IN

## 2017-11-01 DIAGNOSIS — R87.622 LGSIL PAP SMEAR OF VAGINA: Primary | ICD-10-CM

## 2017-11-01 PROCEDURE — 57421 EXAM/BIOPSY OF VAG W/SCOPE: CPT | Mod: S$GLB,,, | Performed by: OBSTETRICS & GYNECOLOGY

## 2017-11-01 PROCEDURE — 88305 TISSUE EXAM BY PATHOLOGIST: CPT | Performed by: PATHOLOGY

## 2017-11-01 PROCEDURE — 99499 UNLISTED E&M SERVICE: CPT | Mod: S$GLB,,, | Performed by: OBSTETRICS & GYNECOLOGY

## 2017-11-01 PROCEDURE — 88305 TISSUE EXAM BY PATHOLOGIST: CPT | Mod: 26,,, | Performed by: PATHOLOGY

## 2017-11-01 PROCEDURE — 99999 PR PBB SHADOW E&M-EST. PATIENT-LVL II: CPT | Mod: PBBFAC,,, | Performed by: OBSTETRICS & GYNECOLOGY

## 2017-11-01 NOTE — PROGRESS NOTES
COLPOSCOPY:    37 y.o. female patient with Patient's last menstrual period was 03/30/2015.  presents for colposcopy.  UPT is negative.  Her most recent papsmear showed LGSIL of the vagina on 10/2/17.  She has a prior history of abnormal paps or dysplasia.    PRE-COLPOSCOPY PROCEDURE COUNSELING:  Discussed the abnormal pap test findings, HPV, need for colposcopy and possible biopsies to determine a diagnosis and plan of care, treatments available, the minimal risks of bleeding and infection with a colposcopy, alternatives to colposcopy and she agrees to proceed.  COLPOSCOPY EXAM:   TIME OUT PERFORMED. There were no vulvar lesions suggestive of condyloma present. The vagina was visualized with a speculum. Acetic acid was applied.White feathery irregular epithelium and possible mosaicism was present at 9 o'clock. Punctation was not present. Mosaic pattern was not present. Biopsy performed: Location: At 9 o'clock of the vagina.     Specimens placed in formalin and sent to pathology. Monsel's solution was applied at biopsy sites to stop bleeding.    The speculum was removed. The patient tolerated the procedure well.    IMPRESSION:   Abnormal Pap 795.09  Colposcopy Impression:  Satisfactory: LYLA 1/ LYLA 2/ POST COLPOSCOPY COUNSELING:   Manage post colposcopy cramping with NSAIDs, Tylenol or Rx per MedCard.  Avoid anything in vagina (intercourse, douching, tampons) one week after the procedure.  Expect a clumpy blackish vaginal discharge (Monsel's solution) for several days.  Report bleeding heavier than a period, worsening pain, fever > 101.0 F, or foul-smelling vaginal discharge.  HPV vaccine recommended according to FDA age guidelines.  Importance of follow-up stressed.    Counseling lasted approximately 15 minutes and all her questions were answered.    FOLLOW-UP:   Based on biopsy results.

## 2017-11-04 ENCOUNTER — PATIENT MESSAGE (OUTPATIENT)
Dept: UROLOGY | Facility: CLINIC | Age: 37
End: 2017-11-04

## 2017-11-05 ENCOUNTER — PATIENT MESSAGE (OUTPATIENT)
Dept: GASTROENTEROLOGY | Facility: CLINIC | Age: 37
End: 2017-11-05

## 2017-11-06 ENCOUNTER — PATIENT MESSAGE (OUTPATIENT)
Dept: INTERNAL MEDICINE | Facility: CLINIC | Age: 37
End: 2017-11-06

## 2017-11-07 ENCOUNTER — PATIENT MESSAGE (OUTPATIENT)
Dept: OBSTETRICS AND GYNECOLOGY | Facility: CLINIC | Age: 37
End: 2017-11-07

## 2017-11-08 ENCOUNTER — PATIENT MESSAGE (OUTPATIENT)
Dept: GASTROENTEROLOGY | Facility: CLINIC | Age: 37
End: 2017-11-08

## 2017-11-08 DIAGNOSIS — K50.819 CROHN'S DISEASE OF SMALL AND LARGE INTESTINES WITH COMPLICATION: Primary | ICD-10-CM

## 2017-11-09 ENCOUNTER — INFUSION (OUTPATIENT)
Dept: INFECTIOUS DISEASES | Facility: HOSPITAL | Age: 37
End: 2017-11-09
Attending: INTERNAL MEDICINE
Payer: COMMERCIAL

## 2017-11-09 ENCOUNTER — LAB VISIT (OUTPATIENT)
Dept: LAB | Facility: HOSPITAL | Age: 37
End: 2017-11-09
Attending: INTERNAL MEDICINE
Payer: COMMERCIAL

## 2017-11-09 VITALS
BODY MASS INDEX: 17.61 KG/M2 | RESPIRATION RATE: 18 BRPM | SYSTOLIC BLOOD PRESSURE: 136 MMHG | TEMPERATURE: 98 F | HEART RATE: 78 BPM | DIASTOLIC BLOOD PRESSURE: 82 MMHG | WEIGHT: 93.25 LBS | HEIGHT: 61 IN

## 2017-11-09 DIAGNOSIS — K50.819 CROHN'S DISEASE OF SMALL AND LARGE INTESTINES WITH COMPLICATION: ICD-10-CM

## 2017-11-09 DIAGNOSIS — K50.819 CROHN'S DISEASE OF SMALL AND LARGE INTESTINES WITH COMPLICATION: Primary | ICD-10-CM

## 2017-11-09 LAB
ALBUMIN SERPL BCP-MCNC: 3.3 G/DL
ALP SERPL-CCNC: 67 U/L
ALT SERPL W/O P-5'-P-CCNC: 14 U/L
ANION GAP SERPL CALC-SCNC: 7 MMOL/L
AST SERPL-CCNC: 20 U/L
BASOPHILS # BLD AUTO: 0.04 K/UL
BASOPHILS NFR BLD: 0.6 %
BILIRUB SERPL-MCNC: 0.4 MG/DL
BUN SERPL-MCNC: 6 MG/DL
CALCIUM SERPL-MCNC: 8.5 MG/DL
CHLORIDE SERPL-SCNC: 109 MMOL/L
CO2 SERPL-SCNC: 24 MMOL/L
CREAT SERPL-MCNC: 0.7 MG/DL
CRP SERPL-MCNC: 3.6 MG/L
DIFFERENTIAL METHOD: ABNORMAL
EOSINOPHIL # BLD AUTO: 0.1 K/UL
EOSINOPHIL NFR BLD: 0.9 %
ERYTHROCYTE [DISTWIDTH] IN BLOOD BY AUTOMATED COUNT: 14.1 %
EST. GFR  (AFRICAN AMERICAN): >60 ML/MIN/1.73 M^2
EST. GFR  (NON AFRICAN AMERICAN): >60 ML/MIN/1.73 M^2
FERRITIN SERPL-MCNC: 27 NG/ML
GLUCOSE SERPL-MCNC: 84 MG/DL
HCT VFR BLD AUTO: 36.7 %
HGB BLD-MCNC: 11.8 G/DL
IMM GRANULOCYTES # BLD AUTO: 0.01 K/UL
IMM GRANULOCYTES NFR BLD AUTO: 0.2 %
LYMPHOCYTES # BLD AUTO: 2 K/UL
LYMPHOCYTES NFR BLD: 29.8 %
MCH RBC QN AUTO: 29.4 PG
MCHC RBC AUTO-ENTMCNC: 32.2 G/DL
MCV RBC AUTO: 92 FL
MONOCYTES # BLD AUTO: 0.7 K/UL
MONOCYTES NFR BLD: 9.9 %
NEUTROPHILS # BLD AUTO: 3.9 K/UL
NEUTROPHILS NFR BLD: 58.6 %
NRBC BLD-RTO: 0 /100 WBC
PLATELET # BLD AUTO: 273 K/UL
PMV BLD AUTO: 9.2 FL
POTASSIUM SERPL-SCNC: 3.7 MMOL/L
PREALB SERPL-MCNC: 23 MG/DL
PROT SERPL-MCNC: 7.4 G/DL
RBC # BLD AUTO: 4.01 M/UL
SODIUM SERPL-SCNC: 140 MMOL/L
WBC # BLD AUTO: 6.65 K/UL

## 2017-11-09 PROCEDURE — 25000003 PHARM REV CODE 250: Performed by: INTERNAL MEDICINE

## 2017-11-09 PROCEDURE — 84134 ASSAY OF PREALBUMIN: CPT

## 2017-11-09 PROCEDURE — 82728 ASSAY OF FERRITIN: CPT

## 2017-11-09 PROCEDURE — 36415 COLL VENOUS BLD VENIPUNCTURE: CPT

## 2017-11-09 PROCEDURE — 86140 C-REACTIVE PROTEIN: CPT

## 2017-11-09 PROCEDURE — 85025 COMPLETE CBC W/AUTO DIFF WBC: CPT

## 2017-11-09 PROCEDURE — 96360 HYDRATION IV INFUSION INIT: CPT

## 2017-11-09 PROCEDURE — 80053 COMPREHEN METABOLIC PANEL: CPT

## 2017-11-09 RX ORDER — IPRATROPIUM BROMIDE AND ALBUTEROL SULFATE 2.5; .5 MG/3ML; MG/3ML
3 SOLUTION RESPIRATORY (INHALATION)
Status: CANCELLED | OUTPATIENT
Start: 2017-11-09

## 2017-11-09 RX ORDER — DIPHENHYDRAMINE HYDROCHLORIDE 50 MG/ML
25 INJECTION INTRAMUSCULAR; INTRAVENOUS
Status: CANCELLED | OUTPATIENT
Start: 2017-11-09

## 2017-11-09 RX ORDER — ACETAMINOPHEN 325 MG/1
650 TABLET ORAL
Status: CANCELLED | OUTPATIENT
Start: 2017-11-09

## 2017-11-09 RX ORDER — EPINEPHRINE 1 MG/ML
0.3 INJECTION, SOLUTION, CONCENTRATE INTRAVENOUS
Status: CANCELLED | OUTPATIENT
Start: 2017-11-09

## 2017-11-09 RX ADMIN — SODIUM CHLORIDE 1000 ML: 0.9 INJECTION, SOLUTION INTRAVENOUS at 08:11

## 2017-11-09 NOTE — PROGRESS NOTES
pt's left subclavian medi port accessed without incidence,  Pt tolerated normal saline bolus 1 Liter bolus only per pt request, without incidence, medi port locked with Heparin flush 500 Units/5ml, pt deaccessed, no bleeding noted to site upon removal, band aid in place

## 2017-11-13 RX ORDER — ONDANSETRON 8 MG/1
8 TABLET, ORALLY DISINTEGRATING ORAL EVERY 8 HOURS PRN
Qty: 30 TABLET | Refills: 0 | Status: SHIPPED | OUTPATIENT
Start: 2017-11-13 | End: 2017-12-11 | Stop reason: SDUPTHER

## 2017-11-13 RX ORDER — DIPHENOXYLATE HYDROCHLORIDE AND ATROPINE SULFATE 2.5; .025 MG/1; MG/1
TABLET ORAL
Qty: 90 TABLET | Refills: 0 | Status: SHIPPED | OUTPATIENT
Start: 2017-11-13 | End: 2018-03-01 | Stop reason: SDUPTHER

## 2017-11-14 ENCOUNTER — OUTPATIENT CASE MANAGEMENT (OUTPATIENT)
Dept: ADMINISTRATIVE | Facility: OTHER | Age: 37
End: 2017-11-14

## 2017-11-14 RX ORDER — ZOLPIDEM TARTRATE 10 MG/1
10 TABLET ORAL NIGHTLY
Qty: 30 TABLET | Refills: 0 | Status: SHIPPED | OUTPATIENT
Start: 2017-11-14 | End: 2017-12-12 | Stop reason: SDUPTHER

## 2017-11-14 RX ORDER — ALPRAZOLAM 0.5 MG/1
0.5 TABLET ORAL 2 TIMES DAILY
Qty: 60 TABLET | Refills: 0 | Status: SHIPPED | OUTPATIENT
Start: 2017-11-14 | End: 2017-12-12 | Stop reason: SDUPTHER

## 2017-11-14 NOTE — PROGRESS NOTES
11/14/2017:   reached out to Jairo Juma in the Financial Department who reported that they have not received an application for this patient.  Jairo has offered to reach out to patient if she agrees in order to complete application via telephone.       left a message on patient's voicemail in an attempt to follow up and notify patient of this.  Will continue to follow.         Plan for next encounter:  1. Follow up with Financial Assistance   2. Assess for additional needs

## 2017-11-14 NOTE — PROGRESS NOTES
11/14/2017  (1st Attempt)  RN-BRAN attempted to contact patient via 3-way call with OPCM-Jackson LEBRON. No answer; left message requesting return call. Will keep patient in monitoring status and will attempt 3-way call with VI again at a later date. Jabier Brown, RN-OPCM

## 2017-11-15 ENCOUNTER — PATIENT MESSAGE (OUTPATIENT)
Dept: INTERNAL MEDICINE | Facility: CLINIC | Age: 37
End: 2017-11-15

## 2017-11-15 RX ORDER — ALPRAZOLAM 0.5 MG/1
0.5 TABLET ORAL 2 TIMES DAILY
Qty: 60 TABLET | Refills: 0 | OUTPATIENT
Start: 2017-11-15

## 2017-11-15 NOTE — TELEPHONE ENCOUNTER
Received transmission fail from [facility] pharmacy for pt xanax, spoke with juvenal in pharmacy states they received ambien rx but not xanax phoned in rx for xanax generic 0.5mg tablet disp 60 r-0 sig 1 tab po bid

## 2017-11-17 ENCOUNTER — PATIENT MESSAGE (OUTPATIENT)
Dept: GASTROENTEROLOGY | Facility: CLINIC | Age: 37
End: 2017-11-17

## 2017-11-17 ENCOUNTER — INFUSION (OUTPATIENT)
Dept: INFECTIOUS DISEASES | Facility: HOSPITAL | Age: 37
End: 2017-11-17
Attending: INTERNAL MEDICINE
Payer: COMMERCIAL

## 2017-11-17 VITALS — WEIGHT: 92.81 LBS | HEIGHT: 61 IN | BODY MASS INDEX: 17.52 KG/M2

## 2017-11-17 DIAGNOSIS — K50.819 CROHN'S DISEASE OF SMALL AND LARGE INTESTINES WITH COMPLICATION: Primary | ICD-10-CM

## 2017-11-17 PROCEDURE — 25000003 PHARM REV CODE 250: Performed by: INTERNAL MEDICINE

## 2017-11-17 PROCEDURE — 96360 HYDRATION IV INFUSION INIT: CPT

## 2017-11-17 PROCEDURE — 96361 HYDRATE IV INFUSION ADD-ON: CPT

## 2017-11-17 RX ORDER — IPRATROPIUM BROMIDE AND ALBUTEROL SULFATE 2.5; .5 MG/3ML; MG/3ML
3 SOLUTION RESPIRATORY (INHALATION)
Status: CANCELLED | OUTPATIENT
Start: 2017-11-17

## 2017-11-17 RX ORDER — DIPHENHYDRAMINE HYDROCHLORIDE 50 MG/ML
25 INJECTION INTRAMUSCULAR; INTRAVENOUS
Status: CANCELLED | OUTPATIENT
Start: 2017-11-17

## 2017-11-17 RX ORDER — ACETAMINOPHEN 325 MG/1
650 TABLET ORAL
Status: CANCELLED | OUTPATIENT
Start: 2017-11-17

## 2017-11-17 RX ORDER — EPINEPHRINE 1 MG/ML
0.3 INJECTION, SOLUTION, CONCENTRATE INTRAVENOUS
Status: CANCELLED | OUTPATIENT
Start: 2017-11-17

## 2017-11-17 RX ADMIN — SODIUM CHLORIDE 2000 ML: 0.9 INJECTION, SOLUTION INTRAVENOUS at 09:11

## 2017-11-20 ENCOUNTER — HOSPITAL ENCOUNTER (INPATIENT)
Facility: HOSPITAL | Age: 37
LOS: 3 days | Discharge: HOME | DRG: 392 | End: 2017-11-23
Attending: EMERGENCY MEDICINE | Admitting: HOSPITALIST
Payer: COMMERCIAL

## 2017-11-20 DIAGNOSIS — K50.118 CROHN'S DISEASE OF COLON WITH OTHER COMPLICATION: ICD-10-CM

## 2017-11-20 DIAGNOSIS — R11.2 INTRACTABLE VOMITING WITH NAUSEA, UNSPECIFIED VOMITING TYPE: ICD-10-CM

## 2017-11-20 DIAGNOSIS — E86.0 DEHYDRATION: ICD-10-CM

## 2017-11-20 DIAGNOSIS — K50.018 CROHN'S DISEASE OF SMALL INTESTINE WITH OTHER COMPLICATION: Primary | Chronic | ICD-10-CM

## 2017-11-20 PROBLEM — K50.10 GRANULOMATOUS COLITIS: Status: ACTIVE | Noted: 2017-11-20

## 2017-11-20 PROBLEM — R63.0 APPETITE IMPAIRED: Status: ACTIVE | Noted: 2017-11-20

## 2017-11-20 LAB
ALBUMIN SERPL BCP-MCNC: 3.7 G/DL
ALP SERPL-CCNC: 80 U/L
ALT SERPL W/O P-5'-P-CCNC: 18 U/L
ANION GAP SERPL CALC-SCNC: 8 MMOL/L
AST SERPL-CCNC: 19 U/L
B-HCG UR QL: NEGATIVE
BACTERIA #/AREA URNS AUTO: NORMAL /HPF
BASOPHILS # BLD AUTO: 0.03 K/UL
BASOPHILS NFR BLD: 0.2 %
BILIRUB SERPL-MCNC: 0.4 MG/DL
BILIRUB UR QL STRIP: NEGATIVE
BUN SERPL-MCNC: 10 MG/DL
CALCIUM SERPL-MCNC: 9.8 MG/DL
CHLORIDE SERPL-SCNC: 106 MMOL/L
CLARITY UR REFRACT.AUTO: CLEAR
CO2 SERPL-SCNC: 25 MMOL/L
COLOR UR AUTO: YELLOW
CREAT SERPL-MCNC: 0.8 MG/DL
CRP SERPL-MCNC: 1.02 MG/L
CTP QC/QA: YES
DIFFERENTIAL METHOD: ABNORMAL
EOSINOPHIL # BLD AUTO: 0.1 K/UL
EOSINOPHIL NFR BLD: 0.3 %
ERYTHROCYTE [DISTWIDTH] IN BLOOD BY AUTOMATED COUNT: 13.9 %
ERYTHROCYTE [SEDIMENTATION RATE] IN BLOOD BY WESTERGREN METHOD: 5 MM/HR
EST. GFR  (AFRICAN AMERICAN): >60 ML/MIN/1.73 M^2
EST. GFR  (NON AFRICAN AMERICAN): >60 ML/MIN/1.73 M^2
ESTIMATED AVG GLUCOSE: 94 MG/DL
GLUCOSE SERPL-MCNC: 98 MG/DL
GLUCOSE UR QL STRIP: NEGATIVE
HBA1C MFR BLD HPLC: 4.9 %
HCT VFR BLD AUTO: 39.4 %
HGB BLD-MCNC: 13.6 G/DL
HGB UR QL STRIP: NEGATIVE
IMM GRANULOCYTES # BLD AUTO: 0.06 K/UL
IMM GRANULOCYTES NFR BLD AUTO: 0.4 %
KETONES UR QL STRIP: NEGATIVE
LEUKOCYTE ESTERASE UR QL STRIP: ABNORMAL
LIPASE SERPL-CCNC: 78 U/L
LYMPHOCYTES # BLD AUTO: 2.2 K/UL
LYMPHOCYTES NFR BLD: 12.8 %
MCH RBC QN AUTO: 30.6 PG
MCHC RBC AUTO-ENTMCNC: 34.5 G/DL
MCV RBC AUTO: 89 FL
MICROSCOPIC COMMENT: NORMAL
MONOCYTES # BLD AUTO: 1.1 K/UL
MONOCYTES NFR BLD: 6.4 %
NEUTROPHILS # BLD AUTO: 13.4 K/UL
NEUTROPHILS NFR BLD: 79.9 %
NITRITE UR QL STRIP: NEGATIVE
NRBC BLD-RTO: 0 /100 WBC
PH UR STRIP: 5 [PH] (ref 5–8)
PLATELET # BLD AUTO: 292 K/UL
PMV BLD AUTO: 9.5 FL
POTASSIUM SERPL-SCNC: 3.3 MMOL/L
PROT SERPL-MCNC: 8.2 G/DL
PROT UR QL STRIP: NEGATIVE
RBC # BLD AUTO: 4.45 M/UL
RBC #/AREA URNS AUTO: 1 /HPF (ref 0–4)
SODIUM SERPL-SCNC: 139 MMOL/L
SP GR UR STRIP: >=1.03 (ref 1–1.03)
SQUAMOUS #/AREA URNS AUTO: 1 /HPF
URN SPEC COLLECT METH UR: ABNORMAL
UROBILINOGEN UR STRIP-ACNC: NEGATIVE EU/DL
WBC # BLD AUTO: 16.8 K/UL
WBC #/AREA URNS AUTO: 5 /HPF (ref 0–5)

## 2017-11-20 PROCEDURE — 11000001 HC ACUTE MED/SURG PRIVATE ROOM

## 2017-11-20 PROCEDURE — 25000003 PHARM REV CODE 250: Performed by: EMERGENCY MEDICINE

## 2017-11-20 PROCEDURE — 63600175 PHARM REV CODE 636 W HCPCS: Performed by: STUDENT IN AN ORGANIZED HEALTH CARE EDUCATION/TRAINING PROGRAM

## 2017-11-20 PROCEDURE — 83036 HEMOGLOBIN GLYCOSYLATED A1C: CPT

## 2017-11-20 PROCEDURE — 99285 EMERGENCY DEPT VISIT HI MDM: CPT | Mod: ,,, | Performed by: EMERGENCY MEDICINE

## 2017-11-20 PROCEDURE — 96376 TX/PRO/DX INJ SAME DRUG ADON: CPT

## 2017-11-20 PROCEDURE — 96365 THER/PROPH/DIAG IV INF INIT: CPT

## 2017-11-20 PROCEDURE — 81001 URINALYSIS AUTO W/SCOPE: CPT

## 2017-11-20 PROCEDURE — 36415 COLL VENOUS BLD VENIPUNCTURE: CPT

## 2017-11-20 PROCEDURE — 25500020 PHARM REV CODE 255: Performed by: EMERGENCY MEDICINE

## 2017-11-20 PROCEDURE — 80053 COMPREHEN METABOLIC PANEL: CPT

## 2017-11-20 PROCEDURE — 99252 IP/OBS CONSLTJ NEW/EST SF 35: CPT | Mod: ,,, | Performed by: NURSE PRACTITIONER

## 2017-11-20 PROCEDURE — 25000003 PHARM REV CODE 250: Performed by: STUDENT IN AN ORGANIZED HEALTH CARE EDUCATION/TRAINING PROGRAM

## 2017-11-20 PROCEDURE — 85651 RBC SED RATE NONAUTOMATED: CPT

## 2017-11-20 PROCEDURE — 63600175 PHARM REV CODE 636 W HCPCS: Performed by: EMERGENCY MEDICINE

## 2017-11-20 PROCEDURE — 99285 EMERGENCY DEPT VISIT HI MDM: CPT | Mod: 25

## 2017-11-20 PROCEDURE — 96375 TX/PRO/DX INJ NEW DRUG ADDON: CPT

## 2017-11-20 PROCEDURE — 85025 COMPLETE CBC W/AUTO DIFF WBC: CPT

## 2017-11-20 PROCEDURE — 87040 BLOOD CULTURE FOR BACTERIA: CPT

## 2017-11-20 PROCEDURE — 96372 THER/PROPH/DIAG INJ SC/IM: CPT

## 2017-11-20 PROCEDURE — 81025 URINE PREGNANCY TEST: CPT | Performed by: EMERGENCY MEDICINE

## 2017-11-20 PROCEDURE — 96361 HYDRATE IV INFUSION ADD-ON: CPT

## 2017-11-20 PROCEDURE — 83690 ASSAY OF LIPASE: CPT

## 2017-11-20 PROCEDURE — 86141 C-REACTIVE PROTEIN HS: CPT

## 2017-11-20 RX ORDER — ONDANSETRON 8 MG/1
8 TABLET, ORALLY DISINTEGRATING ORAL EVERY 8 HOURS PRN
Status: DISCONTINUED | OUTPATIENT
Start: 2017-11-20 | End: 2017-11-23 | Stop reason: HOSPADM

## 2017-11-20 RX ORDER — ONDANSETRON 2 MG/ML
4 INJECTION INTRAMUSCULAR; INTRAVENOUS
Status: COMPLETED | OUTPATIENT
Start: 2017-11-20 | End: 2017-11-20

## 2017-11-20 RX ORDER — SODIUM CHLORIDE, SODIUM LACTATE, POTASSIUM CHLORIDE, CALCIUM CHLORIDE 600; 310; 30; 20 MG/100ML; MG/100ML; MG/100ML; MG/100ML
INJECTION, SOLUTION INTRAVENOUS CONTINUOUS
Status: ACTIVE | OUTPATIENT
Start: 2017-11-20 | End: 2017-11-21

## 2017-11-20 RX ORDER — HYDROMORPHONE HYDROCHLORIDE 1 MG/ML
1 INJECTION, SOLUTION INTRAMUSCULAR; INTRAVENOUS; SUBCUTANEOUS
Status: COMPLETED | OUTPATIENT
Start: 2017-11-20 | End: 2017-11-20

## 2017-11-20 RX ORDER — POTASSIUM CHLORIDE 750 MG/1
40 CAPSULE, EXTENDED RELEASE ORAL ONCE
Status: COMPLETED | OUTPATIENT
Start: 2017-11-20 | End: 2017-11-20

## 2017-11-20 RX ORDER — ONDANSETRON 2 MG/ML
4 INJECTION INTRAMUSCULAR; INTRAVENOUS ONCE
Status: COMPLETED | OUTPATIENT
Start: 2017-11-20 | End: 2017-11-20

## 2017-11-20 RX ORDER — VALACYCLOVIR HYDROCHLORIDE 500 MG/1
500 TABLET, FILM COATED ORAL DAILY
Status: DISCONTINUED | OUTPATIENT
Start: 2017-11-21 | End: 2017-11-23 | Stop reason: HOSPADM

## 2017-11-20 RX ORDER — ZOLPIDEM TARTRATE 5 MG/1
10 TABLET ORAL NIGHTLY PRN
Status: DISCONTINUED | OUTPATIENT
Start: 2017-11-20 | End: 2017-11-23 | Stop reason: HOSPADM

## 2017-11-20 RX ORDER — ACETAMINOPHEN 325 MG/1
650 TABLET ORAL EVERY 8 HOURS PRN
Status: DISCONTINUED | OUTPATIENT
Start: 2017-11-20 | End: 2017-11-23 | Stop reason: HOSPADM

## 2017-11-20 RX ORDER — ENOXAPARIN SODIUM 100 MG/ML
40 INJECTION SUBCUTANEOUS EVERY 24 HOURS
Status: DISCONTINUED | OUTPATIENT
Start: 2017-11-20 | End: 2017-11-23 | Stop reason: HOSPADM

## 2017-11-20 RX ORDER — IBUPROFEN 200 MG
16 TABLET ORAL
Status: DISCONTINUED | OUTPATIENT
Start: 2017-11-20 | End: 2017-11-23 | Stop reason: HOSPADM

## 2017-11-20 RX ORDER — GLUCAGON 1 MG
1 KIT INJECTION
Status: DISCONTINUED | OUTPATIENT
Start: 2017-11-20 | End: 2017-11-23 | Stop reason: HOSPADM

## 2017-11-20 RX ORDER — OXYCODONE AND ACETAMINOPHEN 10; 325 MG/1; MG/1
1 TABLET ORAL EVERY 6 HOURS PRN
Status: DISCONTINUED | OUTPATIENT
Start: 2017-11-20 | End: 2017-11-23 | Stop reason: HOSPADM

## 2017-11-20 RX ORDER — DIPHENHYDRAMINE HYDROCHLORIDE 50 MG/ML
25 INJECTION INTRAMUSCULAR; INTRAVENOUS
Status: COMPLETED | OUTPATIENT
Start: 2017-11-20 | End: 2017-11-20

## 2017-11-20 RX ORDER — OXYCODONE HYDROCHLORIDE 5 MG/1
5 TABLET ORAL EVERY 6 HOURS PRN
Status: DISCONTINUED | OUTPATIENT
Start: 2017-11-20 | End: 2017-11-20

## 2017-11-20 RX ORDER — HYDROMORPHONE HYDROCHLORIDE 1 MG/ML
1 INJECTION, SOLUTION INTRAMUSCULAR; INTRAVENOUS; SUBCUTANEOUS EVERY 8 HOURS PRN
Status: DISCONTINUED | OUTPATIENT
Start: 2017-11-20 | End: 2017-11-21

## 2017-11-20 RX ORDER — SODIUM CHLORIDE 0.9 % (FLUSH) 0.9 %
5 SYRINGE (ML) INJECTION
Status: DISCONTINUED | OUTPATIENT
Start: 2017-11-20 | End: 2017-11-23 | Stop reason: HOSPADM

## 2017-11-20 RX ORDER — IBUPROFEN 200 MG
24 TABLET ORAL
Status: DISCONTINUED | OUTPATIENT
Start: 2017-11-20 | End: 2017-11-23 | Stop reason: HOSPADM

## 2017-11-20 RX ORDER — DRONABINOL 2.5 MG/1
2.5 CAPSULE ORAL
Status: DISCONTINUED | OUTPATIENT
Start: 2017-11-20 | End: 2017-11-23 | Stop reason: HOSPADM

## 2017-11-20 RX ADMIN — SODIUM CHLORIDE 1000 ML: 0.9 INJECTION, SOLUTION INTRAVENOUS at 02:11

## 2017-11-20 RX ADMIN — HYDROMORPHONE HYDROCHLORIDE 1 MG: 1 INJECTION, SOLUTION INTRAMUSCULAR; INTRAVENOUS; SUBCUTANEOUS at 11:11

## 2017-11-20 RX ADMIN — HYDROMORPHONE HYDROCHLORIDE 1 MG: 1 INJECTION, SOLUTION INTRAMUSCULAR; INTRAVENOUS; SUBCUTANEOUS at 02:11

## 2017-11-20 RX ADMIN — ENOXAPARIN SODIUM 40 MG: 100 INJECTION SUBCUTANEOUS at 06:11

## 2017-11-20 RX ADMIN — ONDANSETRON 8 MG: 8 TABLET, ORALLY DISINTEGRATING ORAL at 10:11

## 2017-11-20 RX ADMIN — HYDROMORPHONE HYDROCHLORIDE 1 MG: 1 INJECTION, SOLUTION INTRAMUSCULAR; INTRAVENOUS; SUBCUTANEOUS at 08:11

## 2017-11-20 RX ADMIN — PROMETHAZINE HYDROCHLORIDE 12.5 MG: 25 INJECTION INTRAMUSCULAR; INTRAVENOUS at 02:11

## 2017-11-20 RX ADMIN — PROMETHAZINE HYDROCHLORIDE 12.5 MG: 25 INJECTION INTRAMUSCULAR; INTRAVENOUS at 09:11

## 2017-11-20 RX ADMIN — OXYCODONE AND ACETAMINOPHEN 1 TABLET: 10; 325 TABLET ORAL at 06:11

## 2017-11-20 RX ADMIN — SODIUM CHLORIDE, SODIUM LACTATE, POTASSIUM CHLORIDE, AND CALCIUM CHLORIDE: .6; .31; .03; .02 INJECTION, SOLUTION INTRAVENOUS at 05:11

## 2017-11-20 RX ADMIN — DRONABINOL 2.5 MG: 2.5 CAPSULE ORAL at 09:11

## 2017-11-20 RX ADMIN — ONDANSETRON 4 MG: 2 INJECTION INTRAMUSCULAR; INTRAVENOUS at 07:11

## 2017-11-20 RX ADMIN — DIPHENHYDRAMINE HYDROCHLORIDE 25 MG: 50 INJECTION, SOLUTION INTRAMUSCULAR; INTRAVENOUS at 08:11

## 2017-11-20 RX ADMIN — ONDANSETRON 4 MG: 2 INJECTION INTRAMUSCULAR; INTRAVENOUS at 08:11

## 2017-11-20 RX ADMIN — ONDANSETRON 4 MG: 2 INJECTION INTRAMUSCULAR; INTRAVENOUS at 03:11

## 2017-11-20 RX ADMIN — IOHEXOL 75 ML: 350 INJECTION, SOLUTION INTRAVENOUS at 07:11

## 2017-11-20 RX ADMIN — SODIUM CHLORIDE 1000 ML: 0.9 INJECTION, SOLUTION INTRAVENOUS at 07:11

## 2017-11-20 RX ADMIN — HYDROMORPHONE HYDROCHLORIDE 1 MG: 1 INJECTION, SOLUTION INTRAMUSCULAR; INTRAVENOUS; SUBCUTANEOUS at 07:11

## 2017-11-20 RX ADMIN — ZOLPIDEM TARTRATE 10 MG: 5 TABLET, FILM COATED ORAL at 10:11

## 2017-11-20 RX ADMIN — POTASSIUM CHLORIDE 40 MEQ: 750 CAPSULE, EXTENDED RELEASE ORAL at 05:11

## 2017-11-20 NOTE — ASSESSMENT & PLAN NOTE
- patient has recurrent chronic abdominal pain - takes percocet at home for relief  - worsening right lower quadrant abdominal pain of a few days duration - associated with nausea and vomiting - no sick contacts and no changes in medication recently - not similar to pain of past Crohn's flares or kidney stones   - ddx:  -cystic ovarian remnant - as seen on imaging on right - patient had symptomatic ovarian cysts that prompted right oophorectomy in 2015 - left ovary was not visualized on surgery   - enteritis - patient has poorly controlled Crohn's and a leukocytosis on imaging   - pancreatitis - patient says pain is similar to pain experienced when she had pancreatitis associated with azathioprine use, associated nausea, and vomiting, and radiation of pain to her back, slight elevation in lipase at 78    - CT abdomen-pelvis: no acute abdominal findings - right adnexal mass   - US pelvis - nonspecific cystic lesions   - labs consistent with a leukocytosis with a left shift   - no need for antibiotics at this time - patient afebrile, not tachypneic, leukocytosis may be inflammatory   - ESR and CRP pending   - lactated ringer hydration at 100c/ hour   - percocet PRN

## 2017-11-20 NOTE — HOSPITAL COURSE
Patient treated for abdominal pain, nausea, and vomiting with IV fluids and antiemetics and pain meds.

## 2017-11-20 NOTE — HOSPITAL COURSE
Pt seen in ER, still with nausea and pain despite several doses of Zofran and dilaudid.  CT a/p 11/20/17:    1. Right adnexal mass abutting the right external iliac vein.  In this patient with a reported history of right salpingo-oophorectomy, this may represent a cystic ovarian remnant or necrotic node with abscess felt to be less likely.  Consider pelvic ultrasound for further evaluation.  2.  Bilateral renal hypodensities and calcifications without hydronephrosis.  3.  No significant inflammatory bowel disease identified.  Postoperative changes of prior colectomy and right lower quadrant ileostomy.  Scans reviewed by Dr. Calderon.  Pelvic US:  preliminary report right ovarian cyst.  Discussed with ER MD.    MRI was done 11/22 and results were discussed with GI, and they plan to follow up with her as an outpatient. Patient's pain was improving. Deemed suitable for discharge on 11/23

## 2017-11-20 NOTE — ASSESSMENT & PLAN NOTE
- patient has recurrent chronic abdominal pain - takes percocet at home for relief  - worsening right lower quadrant abdominal pain of a few days duration - associated with nausea and vomiting - no sick contacts and no changes in medication recently - not similar to pain of past Crohn's flares or kidney stones   - ddx:  -cystic ovarian remnant - as seen on imaging on right - patient had symptomatic ovarian cysts that prompted right oophorectomy in 2015 - left ovary was not visualized on surgery   - enteritis - patient has poorly controlled Crohn's and a leukocytosis on imaging   - pancreatitis - patient says pain is similar to pain experienced when she had pancreatitis associated with azathioprine use, associated nausea, and vomiting, and radiation of pain to her back, slight elevation in lipase at 78    - CT abdomen-pelvis: no acute abdominal findings - right adnexal mass   - US pelvis - nonspecific cystic lesions   - labs consistent with a leukocytosis with a left shift   - no need for antibiotics at this time - patient afebrile, not tachypneic, leukocytosis may be inflammatory   - ESR and CRP pending   - lactated ringer hydration at 100c/ hour   - percocet PRN   - colorectal saw patient in ED - no surgical intervention needed   - GI consult in AM

## 2017-11-20 NOTE — H&P
[facility] Medicine  History & Physical    Patient Name: Ivy Salgado  MRN: [id number]  Admission Date: 11/20/2017  Attending Physician: Salas Gray III, MD   Primary Care Provider: Kalia Astorga MD    [facility] Medicine Team: [facility] 4 Milagros Wilson MD     Patient information was obtained from patient and ER records.     Subjective:     Principal Problem:Abdominal pain    Chief Complaint:   Chief Complaint   Patient presents with    Abdominal Pain     Pt c/o right lower and epigastric pain that radiates to her back.         HPI: Ms. Salgado is 37-year-old white female with Crohn's disease (s/p total colectomy 2012 and partial iliectomy on Stelera - follows with Dr. Ross), recurrent kidney stones, cystic ovarian pain (s/p total abdominal hysterectomy April 2015 with right salipingoophorectomy - the left could not be identified), anxiety (takes xanax PRN), HTN (on lisinopril), and genital HSV (on valtrex daily - last outbreak 2 years ago) who presents with right lower quadrant, inguinal region abdominal pain. Patient states that she has constant abdominal pain for which she takes percocet; however, had worsening abdominal pain that was sharp and wrapped around her right side on Sunday. She also had a bout of nausea and vomiting on Sunday. She has not had changes in amount of output in her ostomy but the consistency is a brown liquid. She has had no sick contacts or changes in medication recently. The pain is different from the pain she has experienced in her last flares. She denies any fever, chills, chest pain, or SOB.   Patient states that she has had constant nausea and decreased appetite with a loss of 15lb since starting stelera therapy for her refractory Crohn's this year.  She also requires IV fluids weekly about 2L since beginning treatment due to volume depletion.     Last MRE 4/27/17 Active Inflammatory bowel disease without luminal narrowing, last ileoscope done  8/18/17:   Scope advanced to 15-20 cm proximal to the stoma; appeared normal. Unable to advance further due to tight angulation of intestines.     Past Medical History:   Diagnosis Date    Abnormal Pap smear 2007    Abnormal Pap smear 5/26/2011    Anemia     Anxiety     Arthritis     C. difficile diarrhea     Crohn's disease     Depression 8/5/2017    Encounter for blood transfusion     Genital HSV     History of colposcopy with cervical biopsy 2007 and 7/2011 2007-LYLA I  and 7/2011- LYLA I    Hypertension     Kidney stone     Kidney stone     Recurrent UTI 4/3/2013    S/P ileostomy 7/9/2012    Sterilization 6/23/2012       Past Surgical History:   Procedure Laterality Date    ABDOMINAL SURGERY      APPENDECTOMY      BLADDER SURGERY      partial cystectomy due to fistula    CKC      COLON SURGERY      COLONOSCOPY      HYSTERECTOMY  04/16/2015    SHELLIE    ILEOSTOMY      OOPHORECTOMY Right 04/16/2015    PORTACATH PLACEMENT      SKIN BIOPSY      SMALL INTESTINE SURGERY      TOTAL COLECTOMY      TUBAL LIGATION  06 06 2012    UPPER GASTROINTESTINAL ENDOSCOPY         Review of patient's allergies indicates:   Allergen Reactions    Azathioprine sodium      Other reaction(s): pancreatitis  Other reaction(s): pancreatitis    Methotrexate      Other reaction(s): infection-    Morphine Itching and Other (See Comments)     Other reaction(s): Itching       No current facility-administered medications on file prior to encounter.      Current Outpatient Prescriptions on File Prior to Encounter   Medication Sig    ALPRAZolam (XANAX) 0.5 MG tablet Take 1 tablet (0.5 mg total) by mouth 2 (two) times daily.    diphenoxylate-atropine 2.5-0.025 mg (LOMOTIL) 2.5-0.025 mg per tablet TAKE 1 TABLET BY MOUTH 3 TIMES A DAY AS NEEDED FOR DIARRHEA    dronabinol (MARINOL) 2.5 MG capsule Take 1 capsule (2.5 mg total) by mouth 2 (two) times daily before meals.    lisinopril 10 MG tablet TAKE 1 TABLET (10 MG  TOTAL) BY MOUTH ONCE DAILY.    ondansetron (ZOFRAN-ODT) 8 MG TbDL TAKE 1 TABLET (8 MG TOTAL) BY MOUTH EVERY 8 (EIGHT) HOURS AS NEEDED (NAUSEA).    oxyCODONE-acetaminophen (PERCOCET)  mg per tablet Take 1 tablet by mouth every 6 (six) hours as needed for Pain.    promethazine (PHENERGAN) 25 MG tablet TAKE 1 TABLET BY MOUTH EVERY 6 HOURS AS NEEDED FOR NAUSEA    sumatriptan (IMITREX) 100 MG tablet Take 1 tablet (100 mg total) by mouth every 2 (two) hours as needed for Migraine (do not exceed 2 doses in 24 hours).    tacrolimus (PROTOPIC) 0.1 % ointment Apply 1 application topically 2 (two) times daily as needed.    ustekinumab (STELARA) 130 mg/26 mL injection Inject into the skin.    valacyclovir (VALTREX) 500 MG tablet TAKE 1 TABLET (500 MG TOTAL) BY MOUTH ONCE DAILY.    zolpidem (AMBIEN) 10 mg Tab Take 1 tablet (10 mg total) by mouth every evening.     Family History     Problem Relation (Age of Onset)    Cancer Father, Maternal Grandfather    Colon cancer Father    Crohn's disease Brother    Endometrial cancer Maternal Aunt    Hypertension Mother    Skin cancer Maternal Grandfather        Social History Main Topics    Smoking status: Never Smoker    Smokeless tobacco: Never Used    Alcohol use 0.0 oz/week      Comment: Patient only drinks wine not regular basis.    Drug use: No    Sexual activity: Yes     Partners: Male     Birth control/ protection: Pill, Surgical, Condom      Comment: HYST     Review of Systems   Constitutional: Positive for activity change, appetite change and unexpected weight change. Negative for chills, diaphoresis, fatigue and fever.   HENT: Negative.    Eyes: Negative.    Respiratory: Negative.    Cardiovascular: Negative.    Gastrointestinal: Positive for abdominal pain, nausea and vomiting. Negative for abdominal distention, blood in stool, constipation and diarrhea.   Genitourinary: Negative for difficulty urinating, flank pain, frequency, hematuria and urgency.    Musculoskeletal: Negative for arthralgias, back pain and myalgias.   Skin: Positive for rash. Negative for color change and pallor.   Allergic/Immunologic: Positive for immunocompromised state.   Psychiatric/Behavioral: Negative for agitation.     Objective:     Vital Signs (Most Recent):  Temp: 98.2 °F (36.8 °C) (11/20/17 1351)  Pulse: 78 (11/20/17 1531)  Resp: 18 (11/20/17 1531)  BP: 125/81 (11/20/17 1531)  SpO2: 96 % (11/20/17 1531) Vital Signs (24h Range):  Temp:  [98.2 °F (36.8 °C)-99.2 °F (37.3 °C)] 98.2 °F (36.8 °C)  Pulse:  [] 78  Resp:  [10-22] 18  SpO2:  [96 %-100 %] 96 %  BP: (103-133)/(67-89) 125/81     Weight: 41.7 kg (92 lb)  Body mass index is 17.38 kg/m².    Physical Exam   Constitutional: She is oriented to person, place, and time. She appears well-developed.   thin   HENT:   Head: Normocephalic and atraumatic.   Eyes: EOM are normal. Pupils are equal, round, and reactive to light.   Neck: Normal range of motion. Neck supple.   Cardiovascular: Normal rate, regular rhythm and normal heart sounds.    No murmur heard.  Pulmonary/Chest: Effort normal and breath sounds normal. No respiratory distress. She has no wheezes. She has no rales.   Abdominal: Soft. Bowel sounds are normal. She exhibits no distension. There is tenderness. There is no rebound.   Slight tenderness to palpation just inferior to the ostomy in the right lower quadrant    Musculoskeletal: Normal range of motion. She exhibits no edema or tenderness.   Neurological: She is alert and oriented to person, place, and time.   Skin: Skin is warm and dry. No rash noted. No erythema. No pallor.   Psychiatric: She has a normal mood and affect.       Significant Labs:   Bilirubin:   Recent Labs  Lab 11/09/17  0957 11/20/17  0705   BILITOT 0.4 0.4     BMP:   Recent Labs  Lab 11/20/17  0705   GLU 98      K 3.3*      CO2 25   BUN 10   CREATININE 0.8   CALCIUM 9.8     CBC:   Recent Labs  Lab 11/20/17  0705   WBC 16.80*   HGB 13.6    HCT 39.4        CMP:   Recent Labs  Lab 11/20/17  0705      K 3.3*      CO2 25   GLU 98   BUN 10   CREATININE 0.8   CALCIUM 9.8   PROT 8.2   ALBUMIN 3.7   BILITOT 0.4   ALKPHOS 80   AST 19   ALT 18   ANIONGAP 8   EGFRNONAA >60.0     Lipase:   Recent Labs  Lab 11/20/17  0705   LIPASE 78*     Urine Studies:   Recent Labs  Lab 11/20/17  0947   COLORU Yellow   APPEARANCEUA Clear   PHUR 5.0   SPECGRAV >=1.030*   PROTEINUA Negative   GLUCUA Negative   KETONESU Negative   BILIRUBINUA Negative   OCCULTUA Negative   NITRITE Negative   UROBILINOGEN Negative   LEUKOCYTESUR Trace*   RBCUA 1   WBCUA 5   BACTERIA Rare   SQUAMEPITHEL 1       Significant Imaging:   CT Abdomen Pelvis (11/20/17):  1. Right adnexal mass abutting the right external iliac vein.  In this patient with a reported history of right salpingo-oophorectomy, this may represent a cystic ovarian remnant or necrotic node with abscess felt to be less likely.  Consider pelvic ultrasound for further evaluation.    2.  Bilateral renal hypodensities and calcifications without hydronephrosis.    3.  No significant inflammatory bowel disease identified.  Postoperative changes of prior colectomy and right lower quadrant ileostomy.      US Pelvis (11/20/17):  Nonspecific 3.4 cm cystic lesion correlating with finding on CT abdomen pelvis 11/20/2017 is suspected to correlate with an ovarian cyst.    Assessment/Plan:     * Abdominal pain    - patient has recurrent chronic abdominal pain - takes percocet at home for relief  - worsening right lower quadrant abdominal pain of a few days duration - associated with nausea and vomiting - no sick contacts and no changes in medication recently - not similar to pain of past Crohn's flares or kidney stones   - ddx:  -cystic ovarian remnant - as seen on imaging on right - patient had symptomatic ovarian cysts that prompted right oophorectomy in 2015 - left ovary was not visualized on surgery   - enteritis - patient  has poorly controlled Crohn's and a leukocytosis on imaging   - pancreatitis - patient says pain is similar to pain experienced when she had pancreatitis associated with azathioprine use, associated nausea, and vomiting, and radiation of pain to her back, slight elevation in lipase at 78    - CT abdomen-pelvis: no acute abdominal findings - right adnexal mass   - US pelvis - nonspecific cystic lesions   - labs consistent with a leukocytosis with a left shift   - no need for antibiotics at this time - patient afebrile, not tachypneic, leukocytosis may be inflammatory   - ESR and CRP pending   - lactated ringer hydration at 100c/ hour   - percocet PRN           Nausea    - PRN zofran and phenegran          Crohn's disease    - patient with history of refractory colitis   - s/p total colectomy with end ileostomy in 2012  - follows with Dr. Ross last seen in Sept 2017   - on Selera since Spring 2017 after failing certolizumab as well as methotrexate and azathioprine   - has had constant abdominal pain and nausea since starting the Stelera as well as decreased appetite            Appetite impaired    - decreased appetite since starting Stelera spring 2017  - on Marinol since Sept 2017 - states only minimally helps          Granulomatous colitis    - patient with history of refractory colitis   - s/p total colectomy with end ileostomy in 2012  - follows with Dr. Ross  - on stelera           Essential hypertension, benign    - takes lisinopril  - will hold for now in lieu of hypotension and volume depletion           Herpes genitalis in women    - on valtrex daily at home   - last outbreak 2015          Generalized anxiety disorder    - xanax PRN at home             VTE Risk Mitigation         Ordered     enoxaparin injection 40 mg  Daily     Route:  Subcutaneous        11/20/17 1631     High Risk of VTE  Once      11/20/17 1631             Milagros Wilson MD  Department of Hospital Medicine   [facility]  [place]

## 2017-11-20 NOTE — ED TRIAGE NOTES
Ivy Salgado, a 37 y.o. female presents to the ED with c/o abdominal cramping and nausea.       Chief Complaint   Patient presents with    Abdominal Pain     Pt c/o right lower and epigastric pain that radiates to her back.      Review of patient's allergies indicates:   Allergen Reactions    Azathioprine sodium      Other reaction(s): pancreatitis  Other reaction(s): pancreatitis    Methotrexate      Other reaction(s): infection-    Morphine Itching and Other (See Comments)     Other reaction(s): Itching     Past Medical History:   Diagnosis Date    Abnormal Pap smear 2007    Abnormal Pap smear 5/26/2011    Anemia     Anxiety     Arthritis     C. difficile diarrhea     Crohn's disease     Depression 8/5/2017    Encounter for blood transfusion     Genital HSV     History of colposcopy with cervical biopsy 2007 and 7/2011 2007-LYLA I  and 7/2011- LYLA I    Hypertension     Kidney stone     Kidney stone     Recurrent UTI 4/3/2013    S/P ileostomy 7/9/2012    Sterilization 6/23/2012

## 2017-11-20 NOTE — SUBJECTIVE & OBJECTIVE
Past Medical History:   Diagnosis Date    Abnormal Pap smear 2007    Abnormal Pap smear 5/26/2011    Anemia     Anxiety     Arthritis     C. difficile diarrhea     Crohn's disease     Depression 8/5/2017    Encounter for blood transfusion     Genital HSV     History of colposcopy with cervical biopsy 2007 and 7/2011 2007-LYLA I  and 7/2011- LYLA I    Hypertension     Kidney stone     Kidney stone     Recurrent UTI 4/3/2013    S/P ileostomy 7/9/2012    Sterilization 6/23/2012       Past Surgical History:   Procedure Laterality Date    ABDOMINAL SURGERY      APPENDECTOMY      BLADDER SURGERY      partial cystectomy due to fistula    CKC      COLON SURGERY      COLONOSCOPY      HYSTERECTOMY  04/16/2015    SHELLIE    ILEOSTOMY      OOPHORECTOMY Right 04/16/2015    PORTACATH PLACEMENT      SKIN BIOPSY      SMALL INTESTINE SURGERY      TOTAL COLECTOMY      TUBAL LIGATION  06 06 2012    UPPER GASTROINTESTINAL ENDOSCOPY         Review of patient's allergies indicates:   Allergen Reactions    Azathioprine sodium      Other reaction(s): pancreatitis  Other reaction(s): pancreatitis    Methotrexate      Other reaction(s): infection-    Morphine Itching and Other (See Comments)     Other reaction(s): Itching       No current facility-administered medications on file prior to encounter.      Current Outpatient Prescriptions on File Prior to Encounter   Medication Sig    ALPRAZolam (XANAX) 0.5 MG tablet Take 1 tablet (0.5 mg total) by mouth 2 (two) times daily.    diphenoxylate-atropine 2.5-0.025 mg (LOMOTIL) 2.5-0.025 mg per tablet TAKE 1 TABLET BY MOUTH 3 TIMES A DAY AS NEEDED FOR DIARRHEA    dronabinol (MARINOL) 2.5 MG capsule Take 1 capsule (2.5 mg total) by mouth 2 (two) times daily before meals.    lisinopril 10 MG tablet TAKE 1 TABLET (10 MG TOTAL) BY MOUTH ONCE DAILY.    ondansetron (ZOFRAN-ODT) 8 MG TbDL TAKE 1 TABLET (8 MG TOTAL) BY MOUTH EVERY 8 (EIGHT) HOURS AS NEEDED (NAUSEA).     oxyCODONE-acetaminophen (PERCOCET)  mg per tablet Take 1 tablet by mouth every 6 (six) hours as needed for Pain.    promethazine (PHENERGAN) 25 MG tablet TAKE 1 TABLET BY MOUTH EVERY 6 HOURS AS NEEDED FOR NAUSEA    sumatriptan (IMITREX) 100 MG tablet Take 1 tablet (100 mg total) by mouth every 2 (two) hours as needed for Migraine (do not exceed 2 doses in 24 hours).    tacrolimus (PROTOPIC) 0.1 % ointment Apply 1 application topically 2 (two) times daily as needed.    ustekinumab (STELARA) 130 mg/26 mL injection Inject into the skin.    valacyclovir (VALTREX) 500 MG tablet TAKE 1 TABLET (500 MG TOTAL) BY MOUTH ONCE DAILY.    zolpidem (AMBIEN) 10 mg Tab Take 1 tablet (10 mg total) by mouth every evening.     Family History     Problem Relation (Age of Onset)    Cancer Father, Maternal Grandfather    Colon cancer Father    Crohn's disease Brother    Endometrial cancer Maternal Aunt    Hypertension Mother    Skin cancer Maternal Grandfather        Social History Main Topics    Smoking status: Never Smoker    Smokeless tobacco: Never Used    Alcohol use 0.0 oz/week      Comment: Patient only drinks wine not regular basis.    Drug use: No    Sexual activity: Yes     Partners: Male     Birth control/ protection: Pill, Surgical, Condom      Comment: HYST     Review of Systems   Constitutional: Positive for activity change, appetite change and unexpected weight change. Negative for chills, diaphoresis, fatigue and fever.   HENT: Negative.    Eyes: Negative.    Respiratory: Negative.    Cardiovascular: Negative.    Gastrointestinal: Positive for abdominal pain, nausea and vomiting. Negative for abdominal distention, blood in stool, constipation and diarrhea.   Genitourinary: Negative for difficulty urinating, flank pain, frequency, hematuria and urgency.   Musculoskeletal: Negative for arthralgias, back pain and myalgias.   Skin: Positive for rash. Negative for color change and pallor.    Allergic/Immunologic: Positive for immunocompromised state.   Psychiatric/Behavioral: Negative for agitation.     Objective:     Vital Signs (Most Recent):  Temp: 98.2 °F (36.8 °C) (11/20/17 1351)  Pulse: 78 (11/20/17 1531)  Resp: 18 (11/20/17 1531)  BP: 125/81 (11/20/17 1531)  SpO2: 96 % (11/20/17 1531) Vital Signs (24h Range):  Temp:  [98.2 °F (36.8 °C)-99.2 °F (37.3 °C)] 98.2 °F (36.8 °C)  Pulse:  [] 78  Resp:  [10-22] 18  SpO2:  [96 %-100 %] 96 %  BP: (103-133)/(67-89) 125/81     Weight: 41.7 kg (92 lb)  Body mass index is 17.38 kg/m².    Physical Exam   Constitutional: She is oriented to person, place, and time. She appears well-developed.   thin   HENT:   Head: Normocephalic and atraumatic.   Eyes: EOM are normal. Pupils are equal, round, and reactive to light.   Neck: Normal range of motion. Neck supple.   Cardiovascular: Normal rate, regular rhythm and normal heart sounds.    No murmur heard.  Pulmonary/Chest: Effort normal and breath sounds normal. No respiratory distress. She has no wheezes. She has no rales.   Abdominal: Soft. Bowel sounds are normal. She exhibits no distension. There is tenderness. There is no rebound.   Slight tenderness to palpation just inferior to the ostomy in the right lower quadrant    Musculoskeletal: Normal range of motion. She exhibits no edema or tenderness.   Neurological: She is alert and oriented to person, place, and time.   Skin: Skin is warm and dry. No rash noted. No erythema. No pallor.   Psychiatric: She has a normal mood and affect.       Significant Labs:   Bilirubin:   Recent Labs  Lab 11/09/17  0957 11/20/17  0705   BILITOT 0.4 0.4     BMP:   Recent Labs  Lab 11/20/17  0705   GLU 98      K 3.3*      CO2 25   BUN 10   CREATININE 0.8   CALCIUM 9.8     CBC:   Recent Labs  Lab 11/20/17  0705   WBC 16.80*   HGB 13.6   HCT 39.4        CMP:   Recent Labs  Lab 11/20/17  0705      K 3.3*      CO2 25   GLU 98   BUN 10   CREATININE 0.8    CALCIUM 9.8   PROT 8.2   ALBUMIN 3.7   BILITOT 0.4   ALKPHOS 80   AST 19   ALT 18   ANIONGAP 8   EGFRNONAA >60.0     Lipase:   Recent Labs  Lab 11/20/17  0705   LIPASE 78*     Urine Studies:   Recent Labs  Lab 11/20/17  0947   COLORU Yellow   APPEARANCEUA Clear   PHUR 5.0   SPECGRAV >=1.030*   PROTEINUA Negative   GLUCUA Negative   KETONESU Negative   BILIRUBINUA Negative   OCCULTUA Negative   NITRITE Negative   UROBILINOGEN Negative   LEUKOCYTESUR Trace*   RBCUA 1   WBCUA 5   BACTERIA Rare   SQUAMEPITHEL 1       Significant Imaging:   CT Abdomen Pelvis (11/20/17):  1. Right adnexal mass abutting the right external iliac vein.  In this patient with a reported history of right salpingo-oophorectomy, this may represent a cystic ovarian remnant or necrotic node with abscess felt to be less likely.  Consider pelvic ultrasound for further evaluation.    2.  Bilateral renal hypodensities and calcifications without hydronephrosis.    3.  No significant inflammatory bowel disease identified.  Postoperative changes of prior colectomy and right lower quadrant ileostomy.      US Pelvis (11/20/17):  Nonspecific 3.4 cm cystic lesion correlating with finding on CT abdomen pelvis 11/20/2017 is suspected to correlate with an ovarian cyst.

## 2017-11-20 NOTE — HPI
Ms. Salgado is 37-year-old white female with Crohn's disease (s/p total colectomy 2012 and partial iliectomy on Stelera - follows with Dr. Ross), recurrent kidney stones, cystic ovarian pain (s/p total abdominal hysterectomy April 2015 with right salipingoophorectomy - the left could not be identified), anxiety (takes xanax PRN), HTN (on lisinopril), and genital HSV (on valtrex daily - last outbreak 2 years ago) who presents with right lower quadrant, inguinal region abdominal pain. Patient states that she has constant abdominal pain for which she takes percocet; however, had worsening abdominal pain that was sharp and wrapped around her right side on Sunday. She also had a bout of nausea and vomiting on Sunday. She has not had changes in amount of output in her ostomy but the consistency is a brown liquid. She has had no sick contacts or changes in medication recently. The pain is different from the pain she has experienced in her last flares. She denies any fever, chills, chest pain, or SOB.   Patient states that she has had constant nausea and decreased appetite with a loss of 15lb since starting stelera therapy for her refractory Crohn's this year.  She also requires IV fluids weekly about 2L since beginning treatment due to volume depletion.     Last MRE 4/27/17 Active Inflammatory bowel disease without luminal narrowing, last ileoscope done 8/18/17:   Scope advanced to 15-20 cm proximal to the stoma; appeared normal. Unable to advance further due to tight angulation of intestines.

## 2017-11-20 NOTE — ASSESSMENT & PLAN NOTE
37 year old female with hx of crohn's, post op TAC with end ileostomy and SHELLIE now with new onset of right abd pain, CT suggestive of right ovarian cyst, ileostomy functional  -keep NPO  -pain control  -consult GYN  -consult GI    No surgical intervention needed at this time  Will continue to follow

## 2017-11-20 NOTE — SUBJECTIVE & OBJECTIVE
(Not in a hospital admission)    Review of patient's allergies indicates:   Allergen Reactions    Azathioprine sodium      Other reaction(s): pancreatitis  Other reaction(s): pancreatitis    Methotrexate      Other reaction(s): infection-    Morphine Itching and Other (See Comments)     Other reaction(s): Itching       Past Medical History:   Diagnosis Date    Abnormal Pap smear 2007    Abnormal Pap smear 5/26/2011    Anemia     Anxiety     Arthritis     C. difficile diarrhea     Crohn's disease     Depression 8/5/2017    Encounter for blood transfusion     Genital HSV     History of colposcopy with cervical biopsy 2007 and 7/2011 2007-YLLA I  and 7/2011- LYLA I    Hypertension     Kidney stone     Kidney stone     Recurrent UTI 4/3/2013    S/P ileostomy 7/9/2012    Sterilization 6/23/2012     Past Surgical History:   Procedure Laterality Date    ABDOMINAL SURGERY      APPENDECTOMY      BLADDER SURGERY      partial cystectomy due to fistula    CKC      COLON SURGERY      COLONOSCOPY      HYSTERECTOMY  04/16/2015    SHELLIE    ILEOSTOMY      OOPHORECTOMY Right 04/16/2015    PORTACATH PLACEMENT      SKIN BIOPSY      SMALL INTESTINE SURGERY      TOTAL COLECTOMY      TUBAL LIGATION  06 06 2012    UPPER GASTROINTESTINAL ENDOSCOPY       Family History     Problem Relation (Age of Onset)    Cancer Father, Maternal Grandfather    Colon cancer Father    Crohn's disease Brother    Endometrial cancer Maternal Aunt    Hypertension Mother    Skin cancer Maternal Grandfather        Social History Main Topics    Smoking status: Never Smoker    Smokeless tobacco: Never Used    Alcohol use 0.0 oz/week      Comment: Patient only drinks wine not regular basis.    Drug use: No    Sexual activity: Yes     Partners: Male     Birth control/ protection: Pill, Surgical, Condom      Comment: HYST     Review of Systems   Constitutional: Positive for activity change, appetite change and fatigue. Negative  for chills and fever.   Respiratory: Negative for apnea and chest tightness.    Cardiovascular: Negative for chest pain, palpitations and leg swelling.   Gastrointestinal: Positive for abdominal pain, nausea and vomiting. Negative for abdominal distention, anal bleeding, blood in stool, constipation, diarrhea and rectal pain.   Genitourinary: Negative for dyspareunia, dysuria, enuresis and flank pain.   Neurological: Negative for dizziness, facial asymmetry, light-headedness, numbness and headaches.   Psychiatric/Behavioral: Negative for agitation, behavioral problems, confusion and decreased concentration.     Objective:     Vital Signs (Most Recent):  Temp: 98.2 °F (36.8 °C) (11/20/17 1351)  Pulse: 82 (11/20/17 1431)  Resp: 15 (11/20/17 1431)  BP: 129/87 (11/20/17 1431)  SpO2: 100 % (11/20/17 1431) Vital Signs (24h Range):  Temp:  [98.2 °F (36.8 °C)-99.2 °F (37.3 °C)] 98.2 °F (36.8 °C)  Pulse:  [] 82  Resp:  [10-22] 15  SpO2:  [96 %-100 %] 100 %  BP: (107-133)/(73-89) 129/87     Weight: 41.7 kg (92 lb)  Body mass index is 17.38 kg/m².    Physical Exam   Constitutional: She is oriented to person, place, and time. She appears well-developed and well-nourished. She appears ill. No distress.   HENT:   Head: Normocephalic.   Eyes: Pupils are equal, round, and reactive to light.   Cardiovascular: Normal rate, regular rhythm and normal heart sounds.    Pulmonary/Chest: Effort normal and breath sounds normal. No respiratory distress. She has no wheezes. She has no rales.   Abdominal: Soft. She exhibits no distension and no mass. There is tenderness. There is no rebound and no guarding.   Neurological: She is alert and oriented to person, place, and time.   Skin: Skin is warm and dry.   Psychiatric: She has a normal mood and affect. Her behavior is normal. Judgment and thought content normal.   Weepy during interview   Nursing note and vitals reviewed.      Significant Labs:  BMP (Last 3 Results):   Recent Labs  Lab  11/20/17  0705   GLU 98      K 3.3*      CO2 25   BUN 10   CREATININE 0.8   CALCIUM 9.8     CBC (Last 3 Results):   Recent Labs  Lab 11/20/17  0705   WBC 16.80*   RBC 4.45   HGB 13.6   HCT 39.4      MCV 89   MCH 30.6   MCHC 34.5       Significant Diagnostics:  CT: I have reviewed all pertinent results/findings within the past 24 hours:  reveiwed by Dr. Calderon

## 2017-11-20 NOTE — CONSULTS
"[facility]  Colorectal Surgery  Consult Note    Patient Name: Ivy Salgado  MRN: [id number]  Admission Date: 11/20/2017  Hospital Length of Stay: 0 days  Attending Physician: Linnea Gray III, MD  Primary Care Provider: Kalia Astorga MD    Inpatient consult to Colorectal Surgery  Consult performed by: ERICH KITCHEN  Consult ordered by: LINNEA GRAY III        Subjective:     Chief Complaint/Reason for Admission: right abd pain    History of Present Illness:  The patient is a 37 y.o. female with hx of:Crohn's disease, kidney stone, anxiety, HTN, and genital HSV  that presents to the ED for evaluation of intermittent RLQ abdominal pain radiating to her back since 3:00 AM. She reports vomiting. Denies fever or chills. Had TAC with end ileostomy for Crohns 6/12/12 with long hospital stay.  Sees Dr. Ross for Crohns.  Had SHELLIE by Dr. Morales 4/16/15 with findings of  Distorted pelvic anatomy with adhesions of omentum and bladder to anterior abdominal wall, right tube and ovary not visualized.  A separate entities, but adhered to the right pelvic sidewall with uterus pulling towards the right side.  The left adnexa could not be identified or palpated and adhesions of posterior uterine wall and posterior cervix. She tells me she has had intermittent vomiting with almost constant nausea for almost a year, "ever since I have been started on Stelera", has been receiving IVF 2-3/week.  Last MRE 4/27/17Active Inflammatory bowel disease without luminal narrowing, last ileoscope done 8/18/17:   Scope advanced to 15-20 cm proximal to the stoma; appeared normal. Unable to advance further due to tight angulation of intestines. iopsies were taken with a cold forceps for histology.(no active colitis),  There was evidence of a widely patent end ileostomy found in the ileostomy. This was characterized by healthy appearing mucosa. Today she says ostomy output has decreased and is a thin brown drainage, " says she realluy has not been eating over the last few days.   Patient states she has been having flare ups since February after being on stelara. She reports kidney stones on her left side. No further concerns or complaints at this time.      (Not in a hospital admission)    Review of patient's allergies indicates:   Allergen Reactions    Azathioprine sodium      Other reaction(s): pancreatitis  Other reaction(s): pancreatitis    Methotrexate      Other reaction(s): infection-    Morphine Itching and Other (See Comments)     Other reaction(s): Itching       Past Medical History:   Diagnosis Date    Abnormal Pap smear 2007    Abnormal Pap smear 5/26/2011    Anemia     Anxiety     Arthritis     C. difficile diarrhea     Crohn's disease     Depression 8/5/2017    Encounter for blood transfusion     Genital HSV     History of colposcopy with cervical biopsy 2007 and 7/2011 2007-LYLA I  and 7/2011- LYLA I    Hypertension     Kidney stone     Kidney stone     Recurrent UTI 4/3/2013    S/P ileostomy 7/9/2012    Sterilization 6/23/2012     Past Surgical History:   Procedure Laterality Date    ABDOMINAL SURGERY      APPENDECTOMY      BLADDER SURGERY      partial cystectomy due to fistula    CKC      COLON SURGERY      COLONOSCOPY      HYSTERECTOMY  04/16/2015    SHELLIE    ILEOSTOMY      OOPHORECTOMY Right 04/16/2015    PORTACATH PLACEMENT      SKIN BIOPSY      SMALL INTESTINE SURGERY      TOTAL COLECTOMY      TUBAL LIGATION  06 06 2012    UPPER GASTROINTESTINAL ENDOSCOPY       Family History     Problem Relation (Age of Onset)    Cancer Father, Maternal Grandfather    Colon cancer Father    Crohn's disease Brother    Endometrial cancer Maternal Aunt    Hypertension Mother    Skin cancer Maternal Grandfather        Social History Main Topics    Smoking status: Never Smoker    Smokeless tobacco: Never Used    Alcohol use 0.0 oz/week      Comment: Patient only drinks wine not regular basis.     Drug use: No    Sexual activity: Yes     Partners: Male     Birth control/ protection: Pill, Surgical, Condom      Comment: HYST     Review of Systems   Constitutional: Positive for activity change, appetite change and fatigue. Negative for chills and fever.   Respiratory: Negative for apnea and chest tightness.    Cardiovascular: Negative for chest pain, palpitations and leg swelling.   Gastrointestinal: Positive for abdominal pain, nausea and vomiting. Negative for abdominal distention, anal bleeding, blood in stool, constipation, diarrhea and rectal pain.   Genitourinary: Negative for dyspareunia, dysuria, enuresis and flank pain.   Neurological: Negative for dizziness, facial asymmetry, light-headedness, numbness and headaches.   Psychiatric/Behavioral: Negative for agitation, behavioral problems, confusion and decreased concentration.     Objective:     Vital Signs (Most Recent):  Temp: 98.2 °F (36.8 °C) (11/20/17 1351)  Pulse: 82 (11/20/17 1431)  Resp: 15 (11/20/17 1431)  BP: 129/87 (11/20/17 1431)  SpO2: 100 % (11/20/17 1431) Vital Signs (24h Range):  Temp:  [98.2 °F (36.8 °C)-99.2 °F (37.3 °C)] 98.2 °F (36.8 °C)  Pulse:  [] 82  Resp:  [10-22] 15  SpO2:  [96 %-100 %] 100 %  BP: (107-133)/(73-89) 129/87     Weight: 41.7 kg (92 lb)  Body mass index is 17.38 kg/m².    Physical Exam   Constitutional: She is oriented to person, place, and time. She appears well-developed and well-nourished. She appears ill. No distress.   HENT:   Head: Normocephalic.   Eyes: Pupils are equal, round, and reactive to light.   Cardiovascular: Normal rate, regular rhythm and normal heart sounds.    Pulmonary/Chest: Effort normal and breath sounds normal. No respiratory distress. She has no wheezes. She has no rales.   Abdominal: Soft. She exhibits no distension and no mass. There is tenderness. There is no rebound and no guarding.   Neurological: She is alert and oriented to person, place, and time.   Skin: Skin is warm and  dry.   Psychiatric: She has a normal mood and affect. Her behavior is normal. Judgment and thought content normal.   Weepy during interview   Nursing note and vitals reviewed.      Significant Labs:  BMP (Last 3 Results):   Recent Labs  Lab 11/20/17  0705   GLU 98      K 3.3*      CO2 25   BUN 10   CREATININE 0.8   CALCIUM 9.8     CBC (Last 3 Results):   Recent Labs  Lab 11/20/17  0705   WBC 16.80*   RBC 4.45   HGB 13.6   HCT 39.4      MCV 89   MCH 30.6   MCHC 34.5       Significant Diagnostics:  CT: I have reviewed all pertinent results/findings within the past 24 hours:  reveiwed by Dr. Calderon    Assessment/Plan:     Crohn's disease    37 year old female with hx of crohn's, post op TAC with end ileostomy and SHELLIE now with new onset of right abd pain, CT suggestive of right ovarian cyst, ileostomy functional  -keep NPO  -pain control  -consult GYN  -consult GI    No surgical intervention needed at this time  Will continue to follow             Thank you for your consult.     Vania Moeller NP  Colorectal Surgery  [facility]

## 2017-11-20 NOTE — ASSESSMENT & PLAN NOTE
- patient with history of refractory colitis   - s/p total colectomy with end ileostomy in 2012  - follows with Dr. Ross  - reinier mak

## 2017-11-20 NOTE — ED PROVIDER NOTES
Encounter Date: 11/20/2017    SCRIBE #1 NOTE: Dot LEVY, jazmin scribing for, and in the presence of, Dr. Gray.       History     Chief Complaint   Patient presents with    Abdominal Pain     Pt c/o right lower and epigastric pain that radiates to her back.      Time patient was seen by the provider: 6:36 AM    The patient is a 37 y.o. female with hx of:Crohn's disease, kidney stone, anxiety, HTN, and genital HSV  that presents to the ED for evaluation of intermittent RLQ abdominal pain radiating to her back since 3:00 AM. She reports vomiting. Denies fever or chills. Patient states she has been having flare ups since February after being on stelara. She reports kidney stones on her left side. No further concerns or complaints at this time.      The history is provided by the patient and medical records.     Review of patient's allergies indicates:   Allergen Reactions    Azathioprine sodium      Other reaction(s): pancreatitis  Other reaction(s): pancreatitis    Methotrexate      Other reaction(s): infection-    Morphine Itching and Other (See Comments)     Other reaction(s): Itching     Past Medical History:   Diagnosis Date    Abnormal Pap smear 2007    Abnormal Pap smear 5/26/2011    Anemia     Anxiety     Arthritis     C. difficile diarrhea     Crohn's disease     Depression 8/5/2017    Encounter for blood transfusion     Genital HSV     History of colposcopy with cervical biopsy 2007 and 7/2011 2007-LYLA I  and 7/2011- LYLA I    Hypertension     Kidney stone     Kidney stone     Recurrent UTI 4/3/2013    S/P ileostomy 7/9/2012    Sterilization 6/23/2012     Past Surgical History:   Procedure Laterality Date    ABDOMINAL SURGERY      APPENDECTOMY      BLADDER SURGERY      partial cystectomy due to fistula    CKC      COLON SURGERY      COLONOSCOPY      HYSTERECTOMY  04/16/2015    SHELLIE    ILEOSTOMY      OOPHORECTOMY Right 04/16/2015    PORTACATH PLACEMENT      SKIN BIOPSY       SMALL INTESTINE SURGERY      TOTAL COLECTOMY      TUBAL LIGATION  06 06 2012    UPPER GASTROINTESTINAL ENDOSCOPY       Family History   Problem Relation Age of Onset    Colon cancer Father     Cancer Father      colon cancer    Hypertension Mother     Cancer Maternal Grandfather      esopghal     Skin cancer Maternal Grandfather     Endometrial cancer Maternal Aunt     Crohn's disease Brother     Breast cancer Neg Hx     Ovarian cancer Neg Hx      Social History   Substance Use Topics    Smoking status: Never Smoker    Smokeless tobacco: Never Used    Alcohol use 0.0 oz/week      Comment: Patient only drinks wine not regular basis.     Review of Systems   Constitutional: Negative for chills and fever.   HENT: Negative for sore throat.    Respiratory: Negative for shortness of breath.    Cardiovascular: Negative for chest pain.   Gastrointestinal: Positive for abdominal pain and vomiting.   Genitourinary: Negative for pelvic pain.   Musculoskeletal: Positive for back pain. Negative for neck pain.   Skin: Negative for rash.   Neurological: Negative for headaches.   Psychiatric/Behavioral: Negative for confusion.       Physical Exam     Initial Vitals [11/20/17 0625]   BP Pulse Resp Temp SpO2   122/80 (!) 122 18 99.2 °F (37.3 °C) 100 %      MAP       94         Physical Exam    Nursing note and vitals reviewed.  Constitutional: She is not diaphoretic. She appears cachectic.   Not well-nourished.   HENT:   Head: Normocephalic and atraumatic.   Nose: Nose normal.   Neck: Normal range of motion.   Cardiovascular: Regular rhythm. Tachycardia present.    Pulmonary/Chest: Breath sounds normal. No respiratory distress.   Abdominal: There is tenderness (moderate right sided TTP).   RLQ colostomy   Musculoskeletal: Normal range of motion.   Neurological: She is alert and oriented to person, place, and time.   Skin: Skin is warm and dry. No rash noted.         ED Course   Procedures  Labs Reviewed   CBC W/  AUTO DIFFERENTIAL - Abnormal; Notable for the following:        Result Value    WBC 16.80 (*)     Gran # 13.4 (*)     Immature Grans (Abs) 0.06 (*)     Mono # 1.1 (*)     Gran% 79.9 (*)     Lymph% 12.8 (*)     All other components within normal limits   COMPREHENSIVE METABOLIC PANEL - Abnormal; Notable for the following:     Potassium 3.3 (*)     All other components within normal limits   LIPASE - Abnormal; Notable for the following:     Lipase 78 (*)     All other components within normal limits   URINALYSIS - Abnormal; Notable for the following:     Specific Gravity, UA >=1.030 (*)     Leukocytes, UA Trace (*)     All other components within normal limits   URINALYSIS MICROSCOPIC   HEMOGLOBIN A1C   HEMOGLOBIN A1C    Narrative:     ADD-ON [facility] #[id number] PER SALAS GRAY MD 17:12  11/20/2017    HIGH SENSITIVITY CRP   SEDIMENTATION RATE, MANUAL   POCT URINE PREGNANCY          X-Rays:   Independently Interpreted Readings:   Abdomen:   Abdomen and Pelvis CT with Contrast - Right possible adnexal mass.     Medical Decision Making:   History:   Old Medical Records: I decided to obtain old medical records.  Initial Assessment:   Patient with long-standing hx of crohn's presents with increasing RLQ abdominal pain. Will perform abdominal labs and CT and re-evaluate.  Differential Diagnosis:   Kidney stone, obstruction, colitis, ileus.   Clinical Tests:   Lab Tests: Ordered and Reviewed  Radiological Study: Ordered and Reviewed  ED Management:  Pt continues to have abdominal pain and n/v despite multiple doses of dilaudid and antiemetics.  CRS evaled patient and will follow along.  Will admit to IM for further management of Chron's flair  Other:   I have discussed this case with another health care provider.            Scribe Attestation:   Scribe #1: I performed the above scribed service and the documentation accurately describes the services I performed. I attest to the accuracy of the note.    I, Dr. Salas Gray  III, personally performed the services described in this documentation. All medical record entries made by the scribe were at my direction and in my presence.  I have reviewed the chart and agree that the record reflects my personal performance and is accurate and complete. Salas Gray III, MD.  7:52 AM 11/21/2017          ED Course      Clinical Impression:   The primary encounter diagnosis was Crohn's disease of colon with other complication. Diagnoses of Dehydration and Intractable vomiting with nausea, unspecified vomiting type were also pertinent to this visit.                           Salas Gray III, MD  11/21/17 0752

## 2017-11-20 NOTE — ASSESSMENT & PLAN NOTE
- patient with history of refractory colitis   - s/p total colectomy with end ileostomy in 2012  - follows with Dr. Ross last seen in Sept 2017   - on Selera since Spring 2017 after failing certolizumab as well as methotrexate and azathioprine   - has had constant abdominal pain and nausea since starting the Stelera as well as decreased appetite

## 2017-11-20 NOTE — ASSESSMENT & PLAN NOTE
- decreased appetite since starting Stelera spring 2017  - on Marinol since Sept 2017 - states only minimally helps

## 2017-11-21 ENCOUNTER — OUTPATIENT CASE MANAGEMENT (OUTPATIENT)
Dept: ADMINISTRATIVE | Facility: OTHER | Age: 37
End: 2017-11-21

## 2017-11-21 ENCOUNTER — TELEPHONE (OUTPATIENT)
Dept: PHARMACY | Facility: CLINIC | Age: 37
End: 2017-11-21

## 2017-11-21 PROBLEM — R11.2 NAUSEA & VOMITING: Status: ACTIVE | Noted: 2017-11-21

## 2017-11-21 LAB
ANION GAP SERPL CALC-SCNC: 7 MMOL/L
BASOPHILS # BLD AUTO: 0.02 K/UL
BASOPHILS NFR BLD: 0.3 %
BUN SERPL-MCNC: 3 MG/DL
C TRACH DNA SPEC QL NAA+PROBE: NOT DETECTED
CALCIUM SERPL-MCNC: 9 MG/DL
CANDIDA RRNA VAG QL PROBE: NEGATIVE
CHLORIDE SERPL-SCNC: 107 MMOL/L
CO2 SERPL-SCNC: 24 MMOL/L
CREAT SERPL-MCNC: 0.7 MG/DL
DIFFERENTIAL METHOD: ABNORMAL
EOSINOPHIL # BLD AUTO: 0.1 K/UL
EOSINOPHIL NFR BLD: 1.2 %
ERYTHROCYTE [DISTWIDTH] IN BLOOD BY AUTOMATED COUNT: 13.8 %
EST. GFR  (AFRICAN AMERICAN): >60 ML/MIN/1.73 M^2
EST. GFR  (NON AFRICAN AMERICAN): >60 ML/MIN/1.73 M^2
G VAGINALIS RRNA GENITAL QL PROBE: POSITIVE
GLUCOSE SERPL-MCNC: 93 MG/DL
HCT VFR BLD AUTO: 35.8 %
HGB BLD-MCNC: 12.1 G/DL
IMM GRANULOCYTES # BLD AUTO: 0.02 K/UL
IMM GRANULOCYTES NFR BLD AUTO: 0.3 %
LYMPHOCYTES # BLD AUTO: 2 K/UL
LYMPHOCYTES NFR BLD: 29.4 %
MAGNESIUM SERPL-MCNC: 1.7 MG/DL
MCH RBC QN AUTO: 30.3 PG
MCHC RBC AUTO-ENTMCNC: 33.8 G/DL
MCV RBC AUTO: 90 FL
MONOCYTES # BLD AUTO: 0.6 K/UL
MONOCYTES NFR BLD: 9 %
N GONORRHOEA DNA SPEC QL NAA+PROBE: NOT DETECTED
NEUTROPHILS # BLD AUTO: 4.1 K/UL
NEUTROPHILS NFR BLD: 59.8 %
NRBC BLD-RTO: 0 /100 WBC
PHOSPHATE SERPL-MCNC: 3.1 MG/DL
PLATELET # BLD AUTO: 258 K/UL
PMV BLD AUTO: 9.7 FL
POTASSIUM SERPL-SCNC: 3.9 MMOL/L
RBC # BLD AUTO: 3.99 M/UL
SODIUM SERPL-SCNC: 138 MMOL/L
T VAGINALIS RRNA GENITAL QL PROBE: NEGATIVE
WBC # BLD AUTO: 6.81 K/UL

## 2017-11-21 PROCEDURE — 87591 N.GONORRHOEAE DNA AMP PROB: CPT

## 2017-11-21 PROCEDURE — 25000003 PHARM REV CODE 250: Performed by: STUDENT IN AN ORGANIZED HEALTH CARE EDUCATION/TRAINING PROGRAM

## 2017-11-21 PROCEDURE — 99233 SBSQ HOSP IP/OBS HIGH 50: CPT | Mod: ,,, | Performed by: INTERNAL MEDICINE

## 2017-11-21 PROCEDURE — 80048 BASIC METABOLIC PNL TOTAL CA: CPT

## 2017-11-21 PROCEDURE — 83735 ASSAY OF MAGNESIUM: CPT

## 2017-11-21 PROCEDURE — 84100 ASSAY OF PHOSPHORUS: CPT

## 2017-11-21 PROCEDURE — 63600175 PHARM REV CODE 636 W HCPCS: Performed by: STUDENT IN AN ORGANIZED HEALTH CARE EDUCATION/TRAINING PROGRAM

## 2017-11-21 PROCEDURE — 99253 IP/OBS CNSLTJ NEW/EST LOW 45: CPT | Mod: ,,, | Performed by: OBSTETRICS & GYNECOLOGY

## 2017-11-21 PROCEDURE — 87449 NOS EACH ORGANISM AG IA: CPT

## 2017-11-21 PROCEDURE — 87660 TRICHOMONAS VAGIN DIR PROBE: CPT

## 2017-11-21 PROCEDURE — 85025 COMPLETE CBC W/AUTO DIFF WBC: CPT

## 2017-11-21 PROCEDURE — 87480 CANDIDA DNA DIR PROBE: CPT

## 2017-11-21 PROCEDURE — 11000001 HC ACUTE MED/SURG PRIVATE ROOM

## 2017-11-21 RX ORDER — HYDROMORPHONE HYDROCHLORIDE 1 MG/ML
0.5 INJECTION, SOLUTION INTRAMUSCULAR; INTRAVENOUS; SUBCUTANEOUS EVERY 6 HOURS PRN
Status: DISCONTINUED | OUTPATIENT
Start: 2017-11-21 | End: 2017-11-23 | Stop reason: HOSPADM

## 2017-11-21 RX ORDER — DEXTROMETHORPHAN HYDROBROMIDE, GUAIFENESIN 5; 100 MG/5ML; MG/5ML
650 LIQUID ORAL DAILY PRN
COMMUNITY
End: 2018-02-20

## 2017-11-21 RX ORDER — HYDROMORPHONE HYDROCHLORIDE 1 MG/ML
0.2 INJECTION, SOLUTION INTRAMUSCULAR; INTRAVENOUS; SUBCUTANEOUS EVERY 8 HOURS PRN
Status: DISCONTINUED | OUTPATIENT
Start: 2017-11-21 | End: 2017-11-21

## 2017-11-21 RX ORDER — LOPERAMIDE HYDROCHLORIDE 2 MG/1
2 CAPSULE ORAL DAILY PRN
Status: ON HOLD | COMMUNITY
End: 2024-01-07 | Stop reason: HOSPADM

## 2017-11-21 RX ORDER — PROMETHAZINE HYDROCHLORIDE 12.5 MG/1
12.5 TABLET ORAL EVERY 6 HOURS PRN
Status: DISCONTINUED | OUTPATIENT
Start: 2017-11-21 | End: 2017-11-23 | Stop reason: HOSPADM

## 2017-11-21 RX ADMIN — ONDANSETRON 8 MG: 8 TABLET, ORALLY DISINTEGRATING ORAL at 07:11

## 2017-11-21 RX ADMIN — VALACYCLOVIR HYDROCHLORIDE 500 MG: 500 TABLET, FILM COATED ORAL at 09:11

## 2017-11-21 RX ADMIN — ENOXAPARIN SODIUM 40 MG: 100 INJECTION SUBCUTANEOUS at 04:11

## 2017-11-21 RX ADMIN — HYDROMORPHONE HYDROCHLORIDE 1 MG: 1 INJECTION, SOLUTION INTRAMUSCULAR; INTRAVENOUS; SUBCUTANEOUS at 04:11

## 2017-11-21 RX ADMIN — ONDANSETRON 8 MG: 8 TABLET, ORALLY DISINTEGRATING ORAL at 09:11

## 2017-11-21 RX ADMIN — OXYCODONE AND ACETAMINOPHEN 1 TABLET: 10; 325 TABLET ORAL at 01:11

## 2017-11-21 RX ADMIN — SODIUM CHLORIDE, SODIUM LACTATE, POTASSIUM CHLORIDE, AND CALCIUM CHLORIDE: .6; .31; .03; .02 INJECTION, SOLUTION INTRAVENOUS at 05:11

## 2017-11-21 RX ADMIN — PROMETHAZINE HYDROCHLORIDE 12.5 MG: 12.5 TABLET ORAL at 10:11

## 2017-11-21 RX ADMIN — DRONABINOL 2.5 MG: 2.5 CAPSULE ORAL at 07:11

## 2017-11-21 RX ADMIN — OXYCODONE AND ACETAMINOPHEN 1 TABLET: 10; 325 TABLET ORAL at 07:11

## 2017-11-21 RX ADMIN — OXYCODONE AND ACETAMINOPHEN 1 TABLET: 10; 325 TABLET ORAL at 12:11

## 2017-11-21 RX ADMIN — PROMETHAZINE HYDROCHLORIDE 12.5 MG: 12.5 TABLET ORAL at 12:11

## 2017-11-21 RX ADMIN — HYDROMORPHONE HYDROCHLORIDE 0.2 MG: 1 INJECTION, SOLUTION INTRAMUSCULAR; INTRAVENOUS; SUBCUTANEOUS at 05:11

## 2017-11-21 RX ADMIN — HYDROMORPHONE HYDROCHLORIDE 0.2 MG: 1 INJECTION, SOLUTION INTRAMUSCULAR; INTRAVENOUS; SUBCUTANEOUS at 09:11

## 2017-11-21 RX ADMIN — ZOLPIDEM TARTRATE 10 MG: 5 TABLET, FILM COATED ORAL at 09:11

## 2017-11-21 RX ADMIN — PROMETHAZINE HYDROCHLORIDE 12.5 MG: 12.5 TABLET ORAL at 04:11

## 2017-11-21 RX ADMIN — HYDROMORPHONE HYDROCHLORIDE 0.5 MG: 1 INJECTION, SOLUTION INTRAMUSCULAR; INTRAVENOUS; SUBCUTANEOUS at 10:11

## 2017-11-21 RX ADMIN — DRONABINOL 2.5 MG: 2.5 CAPSULE ORAL at 04:11

## 2017-11-21 NOTE — ASSESSMENT & PLAN NOTE
38 yo F with Crohn's disease s/p TAC and end ileostomy presents with two days of new right sided abdominal pain of unclear etiology. Pain does not appear to be clearly related to Crohn's disease. UA normal so unlikely renal source. Does not appear to pelvic related per gyn who did pelvic exam. CT with no other sources of abdominal pain.    Recommendations:  - Continue pain management   - No indication for inpatient steroids as of right now we have no indication that this is a Crohn's flare  - Further work-up of abdominal pain per primary  - Will touch base with Dr. Ross tomorrow to see if we need inpatient MR enterography

## 2017-11-21 NOTE — PROGRESS NOTES
11/21/2017:   unable to follow up with patient at this time since she is admitted to acute care.  Will follow up at a later date.

## 2017-11-21 NOTE — CHAPLAIN
"Pt requested  visit. 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   She is frustrated, saying that no one listens to her: "I don't want pain meds, I want to beable to go home and back to work." Pt has a 10 yo daughter, Vicki, who is in the care of the pt's parents and visits in the afternoon.  Dtr is out of school this week and pt wants to be home with her. She very much wants the doctors to treat her Chron's. She was scheduled for an MRI on Friday to determine the effect of a new medication she has been trying. Pt said she spoke to the  resident and asked to be transferred to colorectal surgery (per notes they will assume her as a pt today).      Provided reflective listening and information about alternative ways of coping (she usually "talks it out"). Will provide list of alternative therapies and will return today with a meditation exercise.Will continue to follow.  "

## 2017-11-21 NOTE — ASSESSMENT & PLAN NOTE
- GYN consulted 2/2 to abdominal pain and 3cm cystic lesion visualized on CT scan and US  - patient states pain is located beneath her ostomy site  - vaginal discharge present. GC/CT and affirm collected, will f/u with results  - patient has suprapubic tenderness and confirms occasional dysuria during HPI. Recommend urine cx. Patient also has hx of chronic UTIs  - Bimanual exam revealed no adnexal tenderness or fullness. Cuff well intact.  - WBC on arrival 16 with left shift however today 6. Patient has been afebrile.   - Previous CT scan in 1/2016 commented on 2cm follicle in right adnexa. Previous CT scans also comment on cystic appearances in left adnexa.  - Given previous documentation of cystic lesions in left and right adnexa, most likely postoperative changes visualized s/p SHELLIE/RSO. As a 2cm lesion was found in 2016 vs this 3cm cystic lesion appreciated on most recent CT , hard to say if this is anything different or a simple inclusion cyst. Reassuring that cystic lesion has been documented in previous CT Scan.  - as patient had no adnexal tenderness on my exam, and previous CT scans show documented right adnexal mass, no further investigation warranted. Abdominal pain unlikely from right adnexal cystic lesion. No further workup from a GYN standpoint.

## 2017-11-21 NOTE — HPI
This is a 38 yo F with Crohn's (dx at 10 yrs old) of the large and small bowel s/p TAC in 2012 with end ileostomy and most recently on Stelara, also with hx of nephrolithiasis, pyelonephritis, adhesions, ovarian cyst who presents to the ED for acute onset new abdominal pain x 2 days. Patient describes pain in the right lower quadrant, under her ostomy bag and above that area, with radiation to the right flank. Describes it as 'stabbing', intermittent, comes every 20-30 minutes and does not feel like her typical Crohn's pain. Reports it feels similar to her kidney pain however denies any dysuria, hematuria, or frequency. Reports associated nausea and vomiting. Denies increased ostomy output. Reports subjective fevers since Stelara was started. In the Ed she underwent CT AP which showed no active IBD but did find a possible ovarian cyst on the right side.     IBD History:  She was diagnosed in 1990. She was trialed on Imuran many years ago which gave her pancreatitis. She tried Remicade and was a secondary non-responder. She tried Humira but developed lupus like reaction. She was started on Cimzia a number of years ago and has remained on this up until April 2017. She is currently being followed by Dr. Ross in clinic. Around this time she was reporting more symptoms including nausea, vomiting, abdominal pain, and increased ostomy output associated with weight loss. She ended up getting   an MR enterography (4/27/17) which suggested active disease and the decision was made to switch to Stelera, with the first dose being given 7/13/17. She has since received a subsequent injection and was due for her next one last week but she has had a number of complaints with Stelara. Reports she had to get three root canals, developed malar rash, and has had low grade fevers with the Stelara. She had hospitalization in August 2017 for abdominal pain and nausea. Underwent ileoscopy which found widely patent end ileostomy with healthy  appearing mucosa and no other significant abnormalities identified. Biopsy showed unremarkable small bowel mucosa; no evidence of active colitis, granuloma, dysplasia or malignancy. Patient last saw Dr. Ross in clinic on 9/18/17 at which point plan was to continue Stelara and restage with MR enterography.

## 2017-11-21 NOTE — HPI
37-year-old WF  with Crohn's disease (s/p total colectomy  and partial iliectomy on Stelera - follows with Dr. Ross), recurrent kidney stones,(s/p total abdominal hysterectomy 2015 with right salipingoophorectomy - the left could not be identified), anxiety (takes xanax PRN), HTN (on lisinopril), and genital HSV (on valtrex daily - last outbreak 2 years ago) presented to the ED with sudden onset right lower quadrant pain that wrapped around to her back on  morning. Patient states the pain was sudden, sharp and unlike any of her other chron's flares. She states the pain is located right below her ostomy site. She states that she attempted to take her home percocet 5mg but had 2 episodes of emesis. She has not had changes in amount of output in her ostomy but the consistency is a brown liquid. She has had no sick contacts or changes in medication recently. She denies any fever, chills, chest pain, or SOB. Patient states that she has had constant nausea and decreased appetite with a loss of 15lb since starting stelera therapy for her refractory Crohn's this year. Patient currently rates the pain as a 6/10, stating it comes and goes roughly every 20 mins.      GYN consulted 2/2 to right cystic adnexal 3.4cm mass visualized on CT scan and US. Patient follows with primary OBGYN Dr. Morales at [facility].      s/p SHELLIE/right salpingectomy/oopherecomty(left ovary could not be found) in . Patient states the procedure was done in conjunction with her total colectomy. Patient previously had right ovarian cystectomy performed in .   G1-,   LMP  17, normal cycles every 28 days last 5-6 days. Denies abnormal uterine bleeding.   Confirms hx of chlamydia that was treat 2 years ago. Currently on valtrex for HSV infection.   Maternal aunt with endometrial cancer, doesn't know type or staging. Denies family hx of ovarian, cervical ca, or breast cancer  LGSIL pap on vaginal cuff on  11/1/2017, s/p colpo.  Attributed to immunosuppressant therapy. Repeat exam in 1 year per primary OBGYN note.   Currently denies any vaginal bleeding. Confirms vaginal discharge. No new partners. Confirms dysuria, no hematuria.

## 2017-11-21 NOTE — PROGRESS NOTES
Pt c/o pain to RLQ rated 10/10 on pain scale. Pt received percocet  with minimal to now relief obtained. IM4 pagepepe and MD notified of pt pain status. Order given for oxycodone IR. When informed the patient about the medication, the patient stated that she isn't absorbing anything and put of her medications are coming out through the ostomy bag. IM4 re-paged and MD notified of pt concern. Order given to admin Dilaudid 1 mg IV q8 hrs prn. Will cont to monitor pt pain status.

## 2017-11-21 NOTE — CONSULTS
[facility]  Obstetrics & Gynecology  Consult Note    Patient Name: Ivy Salgado  MRN: [id number]  Admission Date: 2017  Hospital Length of Stay: 1 days  Code Status: Full Code  Primary Care Provider: Kalia Astorga MD  Principal Problem: Abdominal pain    Consults  Subjective:     Chief Complaint: Abdominal pain     History of Present Illness:  37-year-old WF  with Crohn's disease (s/p total colectomy  and partial iliectomy on Stelera - follows with Dr. Ross), recurrent kidney stones,s/p total abdominal hysterectomy 2015 with right salipingoophorectomy - JEFERSON BASURTO, WITH SIGNIFICANT PELVIC ADHESIVE DISEASE (the left ADNEXA could not be identified AND PT REPORTS INCIDENTAL CYSTOTOMY), anxiety (takes xanax PRN), HTN (on lisinopril), and genital HSV (on valtrex daily - last outbreak 2 years ago) presented to the ED with sudden onset right lower quadrant pain that wrapped around to her back on  morning. Patient states the pain was sudden, sharp and unlike any of her other chron's flares. She states the pain is located right below her ostomy site. She states that she attempted to take her home percocet 5mg but had 2 episodes of emesis. She has not had changes in amount of output in her ostomy but the consistency is a brown liquid. She has had no sick contacts or changes in medication recently. She denies any fever, chills, chest pain, or SOB. Patient states that she has had constant nausea and decreased appetite with a loss of 15lb since starting stelera therapy for her refractory Crohn's this year. Patient currently rates the pain as a 6/10, stating it comes and goes roughly every 20 mins.      GYN consulted 2/2 to right cystic adnexal 3.4cm mass visualized on CT scan and US. Patient follows with primary OBGYN Dr. Morales at [facility].      s/p SHELLIE/right salpingectomy/oopherecomty(left ovary could not be found) in . Patient states the procedure was done  in conjunction with her total colectomy. Patient previously had right ovarian cystectomy performed in .   G1-,   Confirms hx of chlamydia that was treat 2 years ago. Currently on valtrex for HSV infection.   Maternal aunt with endometrial cancer, doesn't know type or staging. Denies family hx of ovarian, cervical ca, or breast cancer  LGSIL pap on vaginal cuff on 2017, s/p colpo.  Attributed to immunosuppressant therapy. Repeat exam in 1 year per primary OBGYN note.   Currently denies any vaginal bleeding. Confirms vaginal discharge. No new partners. Confirms dysuria, no hematuria.        Obstetric History       T1      L1     SAB1   TAB0   Ectopic0   Multiple0   Live Births1       # Outcome Date GA Lbr Sunday/2nd Weight Sex Delivery Anes PTL Lv   2 Term 07   2.353 kg (5 lb 3 oz) F Vag-Spont   ALEJANDRA      Name: Vicki   2002                Past Medical History:   Diagnosis Date    Abnormal Pap smear     Abnormal Pap smear 2011    Anemia     Anxiety     Arthritis     C. difficile diarrhea     Crohn's disease     Depression 2017    Encounter for blood transfusion     Genital HSV     History of colposcopy with cervical biopsy  and 2011-LYLA I  and 2011- LYLA I    Hypertension     Kidney stone     Kidney stone     Recurrent UTI 4/3/2013    S/P ileostomy 2012    Sterilization 2012     Past Surgical History:   Procedure Laterality Date    ABDOMINAL SURGERY      APPENDECTOMY      BLADDER SURGERY      partial cystectomy due to fistula    CKC      COLON SURGERY      COLONOSCOPY      HYSTERECTOMY  2015    SHELLIE    ILEOSTOMY      OOPHORECTOMY Right 2015    PORTACATH PLACEMENT      SKIN BIOPSY      SMALL INTESTINE SURGERY      TOTAL COLECTOMY      TUBAL LIGATION  2012    UPPER GASTROINTESTINAL ENDOSCOPY         PTA Medications   Medication Sig    ALPRAZolam (XANAX) 0.5 MG tablet Take 1 tablet (0.5 mg total)  by mouth 2 (two) times daily.    diphenoxylate-atropine 2.5-0.025 mg (LOMOTIL) 2.5-0.025 mg per tablet TAKE 1 TABLET BY MOUTH 3 TIMES A DAY AS NEEDED FOR DIARRHEA    dronabinol (MARINOL) 2.5 MG capsule Take 1 capsule (2.5 mg total) by mouth 2 (two) times daily before meals.    lisinopril 10 MG tablet TAKE 1 TABLET (10 MG TOTAL) BY MOUTH ONCE DAILY.    ondansetron (ZOFRAN-ODT) 8 MG TbDL TAKE 1 TABLET (8 MG TOTAL) BY MOUTH EVERY 8 (EIGHT) HOURS AS NEEDED (NAUSEA).    oxyCODONE-acetaminophen (PERCOCET)  mg per tablet Take 1 tablet by mouth every 6 (six) hours as needed for Pain.    promethazine (PHENERGAN) 25 MG tablet TAKE 1 TABLET BY MOUTH EVERY 6 HOURS AS NEEDED FOR NAUSEA    sumatriptan (IMITREX) 100 MG tablet Take 1 tablet (100 mg total) by mouth every 2 (two) hours as needed for Migraine (do not exceed 2 doses in 24 hours).    tacrolimus (PROTOPIC) 0.1 % ointment Apply 1 application topically 2 (two) times daily as needed.    ustekinumab (STELARA) 130 mg/26 mL injection Inject into the skin.    valacyclovir (VALTREX) 500 MG tablet TAKE 1 TABLET (500 MG TOTAL) BY MOUTH ONCE DAILY.    zolpidem (AMBIEN) 10 mg Tab Take 1 tablet (10 mg total) by mouth every evening.       Review of patient's allergies indicates:   Allergen Reactions    Azathioprine sodium      Other reaction(s): pancreatitis  Other reaction(s): pancreatitis    Methotrexate      Other reaction(s): infection-    Morphine Itching and Other (See Comments)     Other reaction(s): Itching        Family History     Problem Relation (Age of Onset)    Cancer Father, Maternal Grandfather    Colon cancer Father    Crohn's disease Brother    Endometrial cancer Maternal Aunt    Hypertension Mother    Skin cancer Maternal Grandfather        Social History Main Topics    Smoking status: Never Smoker    Smokeless tobacco: Never Used    Alcohol use 0.0 oz/week      Comment: Patient only drinks wine not regular basis.    Drug use: No    Sexual  activity: Yes     Partners: Male     Birth control/ protection: Pill, Surgical, Condom      Comment: HYST     Review of Systems   Constitutional: Negative for chills and fever.   Respiratory: Negative for cough and shortness of breath.    Cardiovascular: Negative for chest pain and leg swelling.   Gastrointestinal: Positive for abdominal pain (pain located under her ostomy site) and nausea. Negative for diarrhea and vomiting.   Genitourinary: Positive for dysuria and flank pain. Negative for hematuria, urgency, vaginal bleeding and vaginal discharge.   Musculoskeletal: Negative for myalgias.   Neurological: Negative for headaches.   Hematological: Does not bruise/bleed easily.      Objective:     Vital Signs (Most Recent):  Temp: 99.1 °F (37.3 °C) (11/21/17 1230)  Pulse: 81 (11/21/17 1230)  Resp: 18 (11/21/17 1230)  BP: (!) 141/97 (11/21/17 1230)  SpO2: 98 % (11/21/17 1230) Vital Signs (24h Range):  Temp:  [97.7 °F (36.5 °C)-99.1 °F (37.3 °C)] 99.1 °F (37.3 °C)  Pulse:  [68-96] 81  Resp:  [15-20] 18  SpO2:  [96 %-100 %] 98 %  BP: ()/(67-97) 141/97     Weight: 43 kg (94 lb 12.8 oz)  Body mass index is 17.91 kg/m².    Patient's last menstrual period was 03/30/2015.    Physical Exam:   Constitutional: She is oriented to person, place, and time. She appears well-developed and well-nourished. No distress.   thin    HENT:   Head: Normocephalic.      Cardiovascular: Normal rate, regular rhythm and normal heart sounds.     Pulmonary/Chest: Effort normal and breath sounds normal. No respiratory distress. She has no wheezes.        Abdominal: Soft. Bowel sounds are normal. There is DIFFUSE RIGHT ABD tenderness TO DEEP PALPATION, BUT NO REBOUND, REFERRED PAIN, NO MASS OR HSM.         Genitourinary: Right adnexum displays no mass and no tenderness. Left adnexum displays no mass and no tenderness. No bleeding in the vagina. Vaginal discharge found. Vaginal cuff normal.Cervix exhibits absence.   Genitourinary Comments: No  active HSV lesions visualized. Normal appearing external genitalia.   White discharge noted in vagina. Vaginal cuff intact, with no lesions present.   Bimanual exam revealed intact cuff with no adnexal tenderness bilaterally.suprapubic tenderess noted during bimanual.                Neurological: She is alert and oriented to person, place, and time.     Psychiatric: She has a normal mood and affect. Her behavior is normal. Thought content normal.       Laboratory:  CBC:   Recent Labs  Lab 11/21/17  0516   WBC 6.81   RBC 3.99*   HGB 12.1   HCT 35.8*      MCV 90   MCH 30.3   MCHC 33.8       Diagnostic Results:        CT Abdomen Pelvis (11/20/17):  1. Right adnexal mass abutting the right external iliac vein.  In this patient with a reported history of right salpingo-oophorectomy, this may represent a cystic ovarian remnant or necrotic node with abscess felt to be less likely.  Consider pelvic ultrasound for further evaluation.    2.  Bilateral renal hypodensities and calcifications without hydronephrosis.    3.  No significant inflammatory bowel disease identified.  Postoperative changes of prior colectomy and right lower quadrant ileostomy.       US Pelvis (11/20/17):  Nonspecific 3.4 cm cystic lesion correlating with finding on CT abdomen pelvis 11/20/2017 is suspected to correlate with an ovarian cyst.  Assessment/Plan:     Ovarian cyst    - GYN consulted 2/2 to abdominal pain and 3cm cystic lesion visualized on CT scan and US  - patient states pain is located beneath her ostomy site  - vaginal discharge present. GC/CT and affirm collected, will f/u with results  - patient has suprapubic tenderness and confirms occasional dysuria during HPI. Recommend urine cx. Patient also has hx of chronic UTIs  - Bimanual exam revealed no adnexal tenderness or fullness. Cuff well intact.  - WBC on arrival 16 with left shift however today 6. Patient has been afebrile.   - Previous CT scan in 1/2016 commented on 2cm  follicle in right adnexa. Previous CT scans also comment on cystic appearances in left adnexa.  - Given previous documentation of cystic lesions in left and right adnexa, most likely postoperative changes visualized s/p SHELLIE/RSO. As a 2cm lesion was found in 2016 vs this 3cm cystic lesion appreciated on most recent CT , hard to say if this is anything different or a simple inclusion cyst. Reassuring that cystic lesion has been documented in previous CT Scan.  - as patient had no adnexal tenderness on my exam, and previous CT scans show documented right adnexal mass, no further investigation warranted. Abdominal pain unlikely from right adnexal cystic lesion. No further workup from a GYN standpoint.               Thank you for your consult. I will sign off. Please contact us if you have any additional questions.    Beverly Fishman MD  Obstetrics & Gynecology  [facility]    CT 1/2016 WITH 2 CM RIGHT ADNEXAL CYSTIC AREA, NOT MUCH DIFFERENT FROM 3 CM AREA NOTED ON IMAGING YESTERDAY.  GIVEN SEVERE SCARRING AT THE TIME OF SURGERY, THIS IS MORE LIKELY PELVIC ADHESIVE DISEASE/ SCARRING RATHER THAN A PAINFUL R OVARIAN PROCESS.  I HAVE A LOW INDEX OF SUSPICION FOR ACUTE GYN PROBLEM CAUSING PAIN.  PATIENT ASKED RE FACIAL RASH AND WHILE IN-HOSP, DERMATOLOGY OPINION MAY BE HELPFUL.  PLEASE RECONSULT IF YOU HAVE FURTHER QUESTIONS OR CLINICALLY CHANGED

## 2017-11-21 NOTE — SUBJECTIVE & OBJECTIVE
Past Medical History:   Diagnosis Date    Abnormal Pap smear 2007    Abnormal Pap smear 5/26/2011    Anemia     Anxiety     Arthritis     C. difficile diarrhea     Crohn's disease     Depression 8/5/2017    Encounter for blood transfusion     Genital HSV     History of colposcopy with cervical biopsy 2007 and 7/2011 2007-LYLA I  and 7/2011- LYLA I    Hypertension     Kidney stone     Kidney stone     Recurrent UTI 4/3/2013    S/P ileostomy 7/9/2012    Sterilization 6/23/2012       Past Surgical History:   Procedure Laterality Date    ABDOMINAL SURGERY      APPENDECTOMY      BLADDER SURGERY      partial cystectomy due to fistula    CKC      COLON SURGERY      COLONOSCOPY      HYSTERECTOMY  04/16/2015    SHELLIE    ILEOSTOMY      OOPHORECTOMY Right 04/16/2015    PORTACATH PLACEMENT      SKIN BIOPSY      SMALL INTESTINE SURGERY      TOTAL COLECTOMY      TUBAL LIGATION  06 06 2012    UPPER GASTROINTESTINAL ENDOSCOPY         Review of patient's allergies indicates:   Allergen Reactions    Azathioprine sodium      Other reaction(s): pancreatitis  Other reaction(s): pancreatitis    Methotrexate      Other reaction(s): infection-    Morphine Itching and Other (See Comments)     Other reaction(s): Itching     Family History     Problem Relation (Age of Onset)    Cancer Father, Maternal Grandfather    Colon cancer Father    Crohn's disease Brother    Endometrial cancer Maternal Aunt    Hypertension Mother    Skin cancer Maternal Grandfather        Social History Main Topics    Smoking status: Never Smoker    Smokeless tobacco: Never Used    Alcohol use 0.0 oz/week      Comment: Patient only drinks wine not regular basis.    Drug use: No    Sexual activity: Yes     Partners: Male     Birth control/ protection: Pill, Surgical, Condom      Comment: HYST     Review of Systems   Constitutional: Positive for activity change, appetite change, fever and unexpected weight change. Negative for  chills.   HENT: Negative for sore throat and trouble swallowing.    Respiratory: Negative for cough and shortness of breath.    Cardiovascular: Negative for chest pain and leg swelling.   Gastrointestinal: Positive for abdominal pain, nausea and vomiting. Negative for abdominal distention, blood in stool, constipation and diarrhea.   Genitourinary: Positive for flank pain. Negative for difficulty urinating, dysuria, frequency and hematuria.   Musculoskeletal: Negative for arthralgias and back pain.   Skin: Negative for color change and pallor.   Neurological: Positive for weakness. Negative for dizziness and light-headedness.   Psychiatric/Behavioral: Positive for dysphoric mood. Negative for agitation.     Objective:     Vital Signs (Most Recent):  Temp: 98.4 °F (36.9 °C) (11/21/17 1557)  Pulse: 79 (11/21/17 1557)  Resp: 18 (11/21/17 1557)  BP: (!) 141/95 (11/21/17 1557)  SpO2: 98 % (11/21/17 1557) Vital Signs (24h Range):  Temp:  [97.7 °F (36.5 °C)-99.1 °F (37.3 °C)] 98.4 °F (36.9 °C)  Pulse:  [69-96] 79  Resp:  [16-20] 18  SpO2:  [98 %-99 %] 98 %  BP: (132-141)/(81-97) 141/95     Weight: 43 kg (94 lb 12.8 oz) (11/20/17 2057)  Body mass index is 17.91 kg/m².      Intake/Output Summary (Last 24 hours) at 11/21/17 1714  Last data filed at 11/21/17 0505   Gross per 24 hour   Intake              220 ml   Output                0 ml   Net              220 ml       Lines/Drains/Airways     Central Venous Catheter Line                 Port A Cath Single Lumen left subclavian -- days          Drain                 Ileostomy RUQ -- days                Physical Exam   Constitutional: She is oriented to person, place, and time.   Appears uncomfortable   HENT:   Mouth/Throat: Oropharynx is clear and moist.   Eyes: No scleral icterus.   Cardiovascular: Normal rate, regular rhythm and normal heart sounds.    No murmur heard.  Pulmonary/Chest: Effort normal and breath sounds normal. No respiratory distress.   Abdominal: Soft.  Bowel sounds are normal. She exhibits no distension and no mass. There is no rebound and no guarding.   RLQ ostomy, tenderness around and right side of abdomen   Musculoskeletal: She exhibits no edema or deformity.   Lymphadenopathy:     She has no cervical adenopathy.   Neurological: She is alert and oriented to person, place, and time.   Skin: Skin is warm and dry. She is not diaphoretic.   Psychiatric:   Dysphoric mood   Vitals reviewed.      Significant Labs:  CBC:   Recent Labs  Lab 11/20/17  0705 11/21/17  0516   WBC 16.80* 6.81   HGB 13.6 12.1   HCT 39.4 35.8*    258     CMP:   Recent Labs  Lab 11/20/17 0705 11/21/17  0516   GLU 98 93   CALCIUM 9.8 9.0   ALBUMIN 3.7  --    PROT 8.2  --     138   K 3.3* 3.9   CO2 25 24    107   BUN 10 3*   CREATININE 0.8 0.7   ALKPHOS 80  --    ALT 18  --    AST 19  --    BILITOT 0.4  --        Significant Imaging:  CT AP 11/20/17    1. Right adnexal mass abutting the right external iliac vein.  In this patient with a reported history of right salpingo-oophorectomy, this may represent a cystic ovarian remnant or necrotic node with abscess felt to be less likely.  Consider pelvic ultrasound for further evaluation.    2.  Bilateral renal hypodensities and calcifications without hydronephrosis.    3.  No significant inflammatory bowel disease identified.  Postoperative changes of prior colectomy and right lower quadrant ileostomy.

## 2017-11-21 NOTE — CONSULTS
[facility]  Gastroenterology  Consult Note    Patient Name: Ivy Salgado  MRN: [id number]  Admission Date: 11/20/2017  Hospital Length of Stay: 1 days  Code Status: Full Code   Attending Provider: Kailash Calderon MD   Consulting Provider: Fox Kelly MD  Primary Care Physician: Kalia Astorga MD  Principal Problem:Abdominal pain    Inpatient consult to Gastroenterology  Consult performed by: FOX KELLY  Consult ordered by: JHON ORTIZ        Subjective:     HPI:  This is a 38 yo F with Crohn's (dx at 10 yrs old) of the large and small bowel s/p TAC in 2012 with end ileostomy and most recently on Stelara, also with hx of nephrolithiasis, pyelonephritis, adhesions, ovarian cyst who presents to the ED for acute onset new abdominal pain x 2 days. Patient describes pain in the right lower quadrant, under her ostomy bag and above that area, with radiation to the right flank. Describes it as 'stabbing', intermittent, comes every 20-30 minutes and does not feel like her typical Crohn's pain. Reports it feels similar to her kidney pain however denies any dysuria, hematuria, or frequency. Reports associated nausea and vomiting. Denies increased ostomy output. Reports subjective fevers since Stelara was started. In the Ed she underwent CT AP which showed no active IBD but did find a possible ovarian cyst on the right side.     IBD History:  She was diagnosed in 1990. She was trialed on Imuran many years ago which gave her pancreatitis. She tried Remicade and was a secondary non-responder. She tried Humira but developed lupus like reaction. She was started on Cimzia a number of years ago and has remained on this up until April 2017. She is currently being followed by Dr. Ross in clinic. Around this time she was reporting more symptoms including nausea, vomiting, abdominal pain, and increased ostomy output associated with weight loss. She ended up getting   an MR enterography (4/27/17) which  suggested active disease and the decision was made to switch to Stelera, with the first dose being given 7/13/17. She has since received a subsequent injection and was due for her next one last week but she has had a number of complaints with Stelara. Reports she had to get three root canals, developed malar rash, and has had low grade fevers with the Stelara. She had hospitalization in August 2017 for abdominal pain and nausea. Underwent ileoscopy which found widely patent end ileostomy with healthy appearing mucosa and no other significant abnormalities identified. Biopsy showed unremarkable small bowel mucosa; no evidence of active colitis, granuloma, dysplasia or malignancy. Patient last saw Dr. Ross in clinic on 9/18/17 at which point plan was to continue Stelara and restage with MR enterography.    Past Medical History:   Diagnosis Date    Abnormal Pap smear 2007    Abnormal Pap smear 5/26/2011    Anemia     Anxiety     Arthritis     C. difficile diarrhea     Crohn's disease     Depression 8/5/2017    Encounter for blood transfusion     Genital HSV     History of colposcopy with cervical biopsy 2007 and 7/2011 2007-LYLA I  and 7/2011- LYLA I    Hypertension     Kidney stone     Kidney stone     Recurrent UTI 4/3/2013    S/P ileostomy 7/9/2012    Sterilization 6/23/2012       Past Surgical History:   Procedure Laterality Date    ABDOMINAL SURGERY      APPENDECTOMY      BLADDER SURGERY      partial cystectomy due to fistula    CKC      COLON SURGERY      COLONOSCOPY      HYSTERECTOMY  04/16/2015    SHELLIE    ILEOSTOMY      OOPHORECTOMY Right 04/16/2015    PORTACATH PLACEMENT      SKIN BIOPSY      SMALL INTESTINE SURGERY      TOTAL COLECTOMY      TUBAL LIGATION  06 06 2012    UPPER GASTROINTESTINAL ENDOSCOPY         Review of patient's allergies indicates:   Allergen Reactions    Azathioprine sodium      Other reaction(s): pancreatitis  Other reaction(s): pancreatitis     Methotrexate      Other reaction(s): infection-    Morphine Itching and Other (See Comments)     Other reaction(s): Itching     Family History     Problem Relation (Age of Onset)    Cancer Father, Maternal Grandfather    Colon cancer Father    Crohn's disease Brother    Endometrial cancer Maternal Aunt    Hypertension Mother    Skin cancer Maternal Grandfather        Social History Main Topics    Smoking status: Never Smoker    Smokeless tobacco: Never Used    Alcohol use 0.0 oz/week      Comment: Patient only drinks wine not regular basis.    Drug use: No    Sexual activity: Yes     Partners: Male     Birth control/ protection: Pill, Surgical, Condom      Comment: [facility]     Review of Systems   Constitutional: Positive for activity change, appetite change, fever and unexpected weight change. Negative for chills.   HENT: Negative for sore throat and trouble swallowing.    Respiratory: Negative for cough and shortness of breath.    Cardiovascular: Negative for chest pain and leg swelling.   Gastrointestinal: Positive for abdominal pain, nausea and vomiting. Negative for abdominal distention, blood in stool, constipation and diarrhea.   Genitourinary: Positive for flank pain. Negative for difficulty urinating, dysuria, frequency and hematuria.   Musculoskeletal: Negative for arthralgias and back pain.   Skin: Negative for color change and pallor.   Neurological: Positive for weakness. Negative for dizziness and light-headedness.   Psychiatric/Behavioral: Positive for dysphoric mood. Negative for agitation.     Objective:     Vital Signs (Most Recent):  Temp: 98.4 °F (36.9 °C) (11/21/17 1557)  Pulse: 79 (11/21/17 1557)  Resp: 18 (11/21/17 1557)  BP: (!) 141/95 (11/21/17 1557)  SpO2: 98 % (11/21/17 1557) Vital Signs (24h Range):  Temp:  [97.7 °F (36.5 °C)-99.1 °F (37.3 °C)] 98.4 °F (36.9 °C)  Pulse:  [69-96] 79  Resp:  [16-20] 18  SpO2:  [98 %-99 %] 98 %  BP: (132-141)/(81-97) 141/95     Weight: 43 kg (94 lb 12.8  oz) (11/20/17 2057)  Body mass index is 17.91 kg/m².      Intake/Output Summary (Last 24 hours) at 11/21/17 1714  Last data filed at 11/21/17 0505   Gross per 24 hour   Intake              220 ml   Output                0 ml   Net              220 ml       Lines/Drains/Airways     Central Venous Catheter Line                 Port A Cath Single Lumen left subclavian -- days          Drain                 Ileostomy RUQ -- days                Physical Exam   Constitutional: She is oriented to person, place, and time.   Appears uncomfortable   HENT:   Mouth/Throat: Oropharynx is clear and moist.   Eyes: No scleral icterus.   Cardiovascular: Normal rate, regular rhythm and normal heart sounds.    No murmur heard.  Pulmonary/Chest: Effort normal and breath sounds normal. No respiratory distress.   Abdominal: Soft. Bowel sounds are normal. She exhibits no distension and no mass. There is no rebound and no guarding.   RLQ ostomy, tenderness around and right side of abdomen   Musculoskeletal: She exhibits no edema or deformity.   Lymphadenopathy:     She has no cervical adenopathy.   Neurological: She is alert and oriented to person, place, and time.   Skin: Skin is warm and dry. She is not diaphoretic.   Psychiatric:   Dysphoric mood   Vitals reviewed.      Significant Labs:  CBC:   Recent Labs  Lab 11/20/17  0705 11/21/17  0516   WBC 16.80* 6.81   HGB 13.6 12.1   HCT 39.4 35.8*    258     CMP:   Recent Labs  Lab 11/20/17  0705 11/21/17  0516   GLU 98 93   CALCIUM 9.8 9.0   ALBUMIN 3.7  --    PROT 8.2  --     138   K 3.3* 3.9   CO2 25 24    107   BUN 10 3*   CREATININE 0.8 0.7   ALKPHOS 80  --    ALT 18  --    AST 19  --    BILITOT 0.4  --        Significant Imaging:  CT AP 11/20/17    1. Right adnexal mass abutting the right external iliac vein.  In this patient with a reported history of right salpingo-oophorectomy, this may represent a cystic ovarian remnant or necrotic node with abscess felt to be  less likely.  Consider pelvic ultrasound for further evaluation.    2.  Bilateral renal hypodensities and calcifications without hydronephrosis.    3.  No significant inflammatory bowel disease identified.  Postoperative changes of prior colectomy and right lower quadrant ileostomy.    Assessment/Plan:     * Abdominal pain    36 yo F with Crohn's disease s/p TAC and end ileostomy presents with two days of new right sided abdominal pain of unclear etiology. Pain does not appear to be clearly related to Crohn's disease. UA normal so unlikely renal source. Does not appear to pelvic related per gyn who did pelvic exam. CT with no other sources of abdominal pain.    Recommendations:  - Continue pain management   - No indication for inpatient steroids as of right now we have no indication that this is a Crohn's flare  - Further work-up of abdominal pain per primary  - Will touch base with Dr. Ross tomorrow to see if we need inpatient MR enterography        Nausea    Check c. diff        Crohn's disease    No evidence right now of Crohn's flare (normal ESR, CRP). Symptoms not described as typical Crohn's pain. She will need to have close hospital follow-up regarding continuation of Stelara vs other. Will clarify with Dr. Ross if she should get MRE while inpatient.            Thank you for your consult. I will follow-up with patient. Please contact us if you have any additional questions.    Fox Tai MD  Gastroenterology  [facility]

## 2017-11-21 NOTE — PROGRESS NOTES
11/21/2017  Chart review completed. RN-BRAN unable to complete follow up with patient today as she has been admitted to hospital. Will attempt to contact patient at a later date. Jabier Parsons, MIKE-OPCM

## 2017-11-21 NOTE — SUBJECTIVE & OBJECTIVE
Obstetric History       T1      L1     SAB1   TAB0   Ectopic0   Multiple0   Live Births1       # Outcome Date GA Lbr Sunday/2nd Weight Sex Delivery Anes PTL Lv   2 Term 07   2.353 kg (5 lb 3 oz) F Vag-Spont   ALEJANDRA      Name: Vicki Dhillon 2000                Past Medical History:   Diagnosis Date    Abnormal Pap smear     Abnormal Pap smear 2011    Anemia     Anxiety     Arthritis     C. difficile diarrhea     Crohn's disease     Depression 2017    Encounter for blood transfusion     Genital HSV     History of colposcopy with cervical biopsy  and 2011-LYLA I  and 2011- LYLA I    Hypertension     Kidney stone     Kidney stone     Recurrent UTI 4/3/2013    S/P ileostomy 2012    Sterilization 2012     Past Surgical History:   Procedure Laterality Date    ABDOMINAL SURGERY      APPENDECTOMY      BLADDER SURGERY      partial cystectomy due to fistula    CKC      COLON SURGERY      COLONOSCOPY      HYSTERECTOMY  2015    SHELLIE    ILEOSTOMY      OOPHORECTOMY Right 2015    PORTACATH PLACEMENT      SKIN BIOPSY      SMALL INTESTINE SURGERY      TOTAL COLECTOMY      TUBAL LIGATION  2012    UPPER GASTROINTESTINAL ENDOSCOPY         PTA Medications   Medication Sig    ALPRAZolam (XANAX) 0.5 MG tablet Take 1 tablet (0.5 mg total) by mouth 2 (two) times daily.    diphenoxylate-atropine 2.5-0.025 mg (LOMOTIL) 2.5-0.025 mg per tablet TAKE 1 TABLET BY MOUTH 3 TIMES A DAY AS NEEDED FOR DIARRHEA    dronabinol (MARINOL) 2.5 MG capsule Take 1 capsule (2.5 mg total) by mouth 2 (two) times daily before meals.    lisinopril 10 MG tablet TAKE 1 TABLET (10 MG TOTAL) BY MOUTH ONCE DAILY.    ondansetron (ZOFRAN-ODT) 8 MG TbDL TAKE 1 TABLET (8 MG TOTAL) BY MOUTH EVERY 8 (EIGHT) HOURS AS NEEDED (NAUSEA).    oxyCODONE-acetaminophen (PERCOCET)  mg per tablet Take 1 tablet by mouth every 6 (six) hours as needed for Pain.     promethazine (PHENERGAN) 25 MG tablet TAKE 1 TABLET BY MOUTH EVERY 6 HOURS AS NEEDED FOR NAUSEA    sumatriptan (IMITREX) 100 MG tablet Take 1 tablet (100 mg total) by mouth every 2 (two) hours as needed for Migraine (do not exceed 2 doses in 24 hours).    tacrolimus (PROTOPIC) 0.1 % ointment Apply 1 application topically 2 (two) times daily as needed.    ustekinumab (STELARA) 130 mg/26 mL injection Inject into the skin.    valacyclovir (VALTREX) 500 MG tablet TAKE 1 TABLET (500 MG TOTAL) BY MOUTH ONCE DAILY.    zolpidem (AMBIEN) 10 mg Tab Take 1 tablet (10 mg total) by mouth every evening.       Review of patient's allergies indicates:   Allergen Reactions    Azathioprine sodium      Other reaction(s): pancreatitis  Other reaction(s): pancreatitis    Methotrexate      Other reaction(s): infection-    Morphine Itching and Other (See Comments)     Other reaction(s): Itching        Family History     Problem Relation (Age of Onset)    Cancer Father, Maternal Grandfather    Colon cancer Father    Crohn's disease Brother    Endometrial cancer Maternal Aunt    Hypertension Mother    Skin cancer Maternal Grandfather        Social History Main Topics    Smoking status: Never Smoker    Smokeless tobacco: Never Used    Alcohol use 0.0 oz/week      Comment: Patient only drinks wine not regular basis.    Drug use: No    Sexual activity: Yes     Partners: Male     Birth control/ protection: Pill, Surgical, Condom      Comment: [facility]     Review of Systems   Constitutional: Negative for chills and fever.   Respiratory: Negative for cough and shortness of breath.    Cardiovascular: Negative for chest pain and leg swelling.   Gastrointestinal: Positive for abdominal pain (pain located under her ostomy site) and nausea. Negative for diarrhea and vomiting.   Genitourinary: Positive for dysuria and flank pain. Negative for hematuria, urgency, vaginal bleeding and vaginal discharge.   Musculoskeletal: Negative for myalgias.    Neurological: Negative for headaches.   Hematological: Does not bruise/bleed easily.      Objective:     Vital Signs (Most Recent):  Temp: 99.1 °F (37.3 °C) (11/21/17 1230)  Pulse: 81 (11/21/17 1230)  Resp: 18 (11/21/17 1230)  BP: (!) 141/97 (11/21/17 1230)  SpO2: 98 % (11/21/17 1230) Vital Signs (24h Range):  Temp:  [97.7 °F (36.5 °C)-99.1 °F (37.3 °C)] 99.1 °F (37.3 °C)  Pulse:  [68-96] 81  Resp:  [15-20] 18  SpO2:  [96 %-100 %] 98 %  BP: ()/(67-97) 141/97     Weight: 43 kg (94 lb 12.8 oz)  Body mass index is 17.91 kg/m².    Patient's last menstrual period was 03/30/2015.    Physical Exam:   Constitutional: She is oriented to person, place, and time. She appears well-developed and well-nourished. No distress.   thin    HENT:   Head: Normocephalic.      Cardiovascular: Normal rate, regular rhythm and normal heart sounds.     Pulmonary/Chest: Effort normal and breath sounds normal. No respiratory distress. She has no wheezes.        Abdominal: Soft. Bowel sounds are normal. There is no tenderness (no tenderness to palpation in all 4 quadrants).         Genitourinary: Right adnexum displays no mass and no tenderness. Left adnexum displays no mass and no tenderness. No bleeding in the vagina. Vaginal discharge found. Vaginal cuff normal.Cervix exhibits absence.   Genitourinary Comments: No active HSV lesions visualized. Normal appearing external genitalia.   White discharge noted in vagina. Vaginal cuff intact, with no lesions present.   Bimanual exam revealed intact cuff with no adnexal tenderness bilaterally.suprapubic tenderess noted during bimanual.                Neurological: She is alert and oriented to person, place, and time.     Psychiatric: She has a normal mood and affect. Her behavior is normal. Thought content normal.       Laboratory:  CBC:   Recent Labs  Lab 11/21/17  0516   WBC 6.81   RBC 3.99*   HGB 12.1   HCT 35.8*      MCV 90   MCH 30.3   MCHC 33.8       Diagnostic Results:

## 2017-11-21 NOTE — ASSESSMENT & PLAN NOTE
No evidence right now of Crohn's flare (normal ESR, CRP). Symptoms not described as typical Crohn's pain. She will need to have close hospital follow-up regarding continuation of Stelara vs other. Will clarify with Dr. Ross if she should get MRE while inpatient.

## 2017-11-22 LAB
ANION GAP SERPL CALC-SCNC: 8 MMOL/L
BASOPHILS # BLD AUTO: 0.01 K/UL
BASOPHILS NFR BLD: 0.2 %
BUN SERPL-MCNC: 3 MG/DL
C DIFF GDH STL QL: NEGATIVE
C DIFF TOX A+B STL QL IA: NEGATIVE
CALCIUM SERPL-MCNC: 8.9 MG/DL
CHLORIDE SERPL-SCNC: 105 MMOL/L
CO2 SERPL-SCNC: 27 MMOL/L
CREAT SERPL-MCNC: 0.7 MG/DL
CRP SERPL-MCNC: 5.13 MG/L
DIFFERENTIAL METHOD: ABNORMAL
EOSINOPHIL # BLD AUTO: 0.1 K/UL
EOSINOPHIL NFR BLD: 1.3 %
ERYTHROCYTE [DISTWIDTH] IN BLOOD BY AUTOMATED COUNT: 13.6 %
EST. GFR  (AFRICAN AMERICAN): >60 ML/MIN/1.73 M^2
EST. GFR  (NON AFRICAN AMERICAN): >60 ML/MIN/1.73 M^2
GLUCOSE SERPL-MCNC: 91 MG/DL
HCT VFR BLD AUTO: 36.5 %
HGB BLD-MCNC: 12 G/DL
IMM GRANULOCYTES # BLD AUTO: 0.01 K/UL
IMM GRANULOCYTES NFR BLD AUTO: 0.2 %
LYMPHOCYTES # BLD AUTO: 1.8 K/UL
LYMPHOCYTES NFR BLD: 31.6 %
MAGNESIUM SERPL-MCNC: 1.7 MG/DL
MCH RBC QN AUTO: 29.1 PG
MCHC RBC AUTO-ENTMCNC: 32.9 G/DL
MCV RBC AUTO: 89 FL
MONOCYTES # BLD AUTO: 0.7 K/UL
MONOCYTES NFR BLD: 12.4 %
NEUTROPHILS # BLD AUTO: 3 K/UL
NEUTROPHILS NFR BLD: 54.3 %
NRBC BLD-RTO: 0 /100 WBC
PHOSPHATE SERPL-MCNC: 3.1 MG/DL
PLATELET # BLD AUTO: 275 K/UL
PMV BLD AUTO: 9.6 FL
POTASSIUM SERPL-SCNC: 3.5 MMOL/L
PREALB SERPL-MCNC: 23 MG/DL
RBC # BLD AUTO: 4.12 M/UL
SODIUM SERPL-SCNC: 140 MMOL/L
WBC # BLD AUTO: 5.57 K/UL

## 2017-11-22 PROCEDURE — 25000003 PHARM REV CODE 250: Performed by: STUDENT IN AN ORGANIZED HEALTH CARE EDUCATION/TRAINING PROGRAM

## 2017-11-22 PROCEDURE — 63600175 PHARM REV CODE 636 W HCPCS: Performed by: STUDENT IN AN ORGANIZED HEALTH CARE EDUCATION/TRAINING PROGRAM

## 2017-11-22 PROCEDURE — 25000003 PHARM REV CODE 250: Performed by: NURSE PRACTITIONER

## 2017-11-22 PROCEDURE — 85025 COMPLETE CBC W/AUTO DIFF WBC: CPT

## 2017-11-22 PROCEDURE — 25000003 PHARM REV CODE 250: Performed by: COLON & RECTAL SURGERY

## 2017-11-22 PROCEDURE — 84100 ASSAY OF PHOSPHORUS: CPT

## 2017-11-22 PROCEDURE — 83735 ASSAY OF MAGNESIUM: CPT

## 2017-11-22 PROCEDURE — 99232 SBSQ HOSP IP/OBS MODERATE 35: CPT | Mod: ,,, | Performed by: COLON & RECTAL SURGERY

## 2017-11-22 PROCEDURE — 63600175 PHARM REV CODE 636 W HCPCS

## 2017-11-22 PROCEDURE — 36415 COLL VENOUS BLD VENIPUNCTURE: CPT

## 2017-11-22 PROCEDURE — 80048 BASIC METABOLIC PNL TOTAL CA: CPT

## 2017-11-22 PROCEDURE — 11000001 HC ACUTE MED/SURG PRIVATE ROOM

## 2017-11-22 PROCEDURE — 86141 C-REACTIVE PROTEIN HS: CPT

## 2017-11-22 PROCEDURE — 84134 ASSAY OF PREALBUMIN: CPT

## 2017-11-22 PROCEDURE — 25500020 PHARM REV CODE 255: Performed by: COLON & RECTAL SURGERY

## 2017-11-22 PROCEDURE — A9585 GADOBUTROL INJECTION: HCPCS | Performed by: COLON & RECTAL SURGERY

## 2017-11-22 RX ORDER — DIPHENHYDRAMINE HYDROCHLORIDE 50 MG/ML
25 INJECTION INTRAMUSCULAR; INTRAVENOUS ONCE
Status: COMPLETED | OUTPATIENT
Start: 2017-11-22 | End: 2017-11-22

## 2017-11-22 RX ORDER — HEPARIN SODIUM,PORCINE/PF 10 UNIT/ML
0.5 SYRINGE (ML) INTRAVENOUS ONCE
Status: DISCONTINUED | OUTPATIENT
Start: 2017-11-22 | End: 2017-11-23

## 2017-11-22 RX ORDER — SODIUM CHLORIDE, SODIUM LACTATE, POTASSIUM CHLORIDE, CALCIUM CHLORIDE 600; 310; 30; 20 MG/100ML; MG/100ML; MG/100ML; MG/100ML
INJECTION, SOLUTION INTRAVENOUS CONTINUOUS
Status: DISCONTINUED | OUTPATIENT
Start: 2017-11-22 | End: 2017-11-23 | Stop reason: HOSPADM

## 2017-11-22 RX ORDER — GADOBUTROL 604.72 MG/ML
10 INJECTION INTRAVENOUS
Status: COMPLETED | OUTPATIENT
Start: 2017-11-22 | End: 2017-11-22

## 2017-11-22 RX ORDER — LORAZEPAM 2 MG/ML
1 INJECTION INTRAMUSCULAR ONCE
Status: COMPLETED | OUTPATIENT
Start: 2017-11-22 | End: 2017-11-22

## 2017-11-22 RX ORDER — METRONIDAZOLE 500 MG/1
500 TABLET ORAL EVERY 12 HOURS
Status: DISCONTINUED | OUTPATIENT
Start: 2017-11-22 | End: 2017-11-23 | Stop reason: HOSPADM

## 2017-11-22 RX ORDER — LORAZEPAM 2 MG/ML
INJECTION INTRAMUSCULAR
Status: COMPLETED
Start: 2017-11-22 | End: 2017-11-22

## 2017-11-22 RX ADMIN — SODIUM CHLORIDE, SODIUM LACTATE, POTASSIUM CHLORIDE, AND CALCIUM CHLORIDE: .6; .31; .03; .02 INJECTION, SOLUTION INTRAVENOUS at 11:11

## 2017-11-22 RX ADMIN — OXYCODONE AND ACETAMINOPHEN 1 TABLET: 10; 325 TABLET ORAL at 02:11

## 2017-11-22 RX ADMIN — LORAZEPAM 1 MG: 2 INJECTION INTRAMUSCULAR at 09:11

## 2017-11-22 RX ADMIN — DIPHENHYDRAMINE HYDROCHLORIDE 25 MG: 50 INJECTION, SOLUTION INTRAMUSCULAR; INTRAVENOUS at 05:11

## 2017-11-22 RX ADMIN — HYDROMORPHONE HYDROCHLORIDE 0.5 MG: 1 INJECTION, SOLUTION INTRAMUSCULAR; INTRAVENOUS; SUBCUTANEOUS at 04:11

## 2017-11-22 RX ADMIN — PROMETHAZINE HYDROCHLORIDE 12.5 MG: 12.5 TABLET ORAL at 04:11

## 2017-11-22 RX ADMIN — OXYCODONE AND ACETAMINOPHEN 1 TABLET: 10; 325 TABLET ORAL at 10:11

## 2017-11-22 RX ADMIN — METRONIDAZOLE 500 MG: 500 TABLET ORAL at 11:11

## 2017-11-22 RX ADMIN — OXYCODONE AND ACETAMINOPHEN 1 TABLET: 10; 325 TABLET ORAL at 08:11

## 2017-11-22 RX ADMIN — LORAZEPAM 1 MG: 2 INJECTION INTRAMUSCULAR; INTRAVENOUS at 09:11

## 2017-11-22 RX ADMIN — PROMETHAZINE HYDROCHLORIDE 12.5 MG: 12.5 TABLET ORAL at 07:11

## 2017-11-22 RX ADMIN — VALACYCLOVIR HYDROCHLORIDE 500 MG: 500 TABLET, FILM COATED ORAL at 08:11

## 2017-11-22 RX ADMIN — ONDANSETRON 8 MG: 8 TABLET, ORALLY DISINTEGRATING ORAL at 02:11

## 2017-11-22 RX ADMIN — GADOBUTROL 10 ML: 604.72 INJECTION INTRAVENOUS at 10:11

## 2017-11-22 RX ADMIN — OXYCODONE AND ACETAMINOPHEN 1 TABLET: 10; 325 TABLET ORAL at 01:11

## 2017-11-22 RX ADMIN — ONDANSETRON 8 MG: 8 TABLET, ORALLY DISINTEGRATING ORAL at 05:11

## 2017-11-22 RX ADMIN — PROMETHAZINE HYDROCHLORIDE 12.5 MG: 12.5 TABLET ORAL at 11:11

## 2017-11-22 RX ADMIN — ONDANSETRON 8 MG: 8 TABLET, ORALLY DISINTEGRATING ORAL at 10:11

## 2017-11-22 RX ADMIN — ENOXAPARIN SODIUM 40 MG: 100 INJECTION SUBCUTANEOUS at 04:11

## 2017-11-22 RX ADMIN — HYDROMORPHONE HYDROCHLORIDE 0.5 MG: 1 INJECTION, SOLUTION INTRAMUSCULAR; INTRAVENOUS; SUBCUTANEOUS at 05:11

## 2017-11-22 RX ADMIN — DRONABINOL 2.5 MG: 2.5 CAPSULE ORAL at 05:11

## 2017-11-22 RX ADMIN — LORAZEPAM 1 MG: 2 INJECTION INTRAMUSCULAR; INTRAVENOUS at 08:11

## 2017-11-22 RX ADMIN — HYDROMORPHONE HYDROCHLORIDE 0.5 MG: 1 INJECTION, SOLUTION INTRAMUSCULAR; INTRAVENOUS; SUBCUTANEOUS at 11:11

## 2017-11-22 RX ADMIN — DRONABINOL 2.5 MG: 2.5 CAPSULE ORAL at 04:11

## 2017-11-22 RX ADMIN — DIPHENHYDRAMINE HYDROCHLORIDE 25 MG: 50 INJECTION, SOLUTION INTRAMUSCULAR; INTRAVENOUS at 02:11

## 2017-11-22 RX ADMIN — METRONIDAZOLE 500 MG: 500 TABLET ORAL at 10:11

## 2017-11-22 NOTE — PLAN OF CARE
Problem: Patient Care Overview  Goal: Plan of Care Review  POC reviewed with pt. Fall risk reviewed. No s/s of worsening infection. VSS.

## 2017-11-22 NOTE — CONSULTS
Patient seen per nurse and her request. Patient is previous ostomy patient and well known to me. She is also self care of her ostomy. She has her supplies and changed her pouch this morning. She was concerned over discoloration and there are some yellow streaks but this appears to be build up of mucus and discharge. Unsure if this is related to her flare up of her Chron's but does not appear to be problematic. There is no bleeding, swelling and the stoma is functioning fine. Patient to call if there are any additional questions or concerns. Nursing to continue care.

## 2017-11-22 NOTE — PLAN OF CARE
CM met with patient this am, obtained dc planning assessment information.  Pt independent in ostomy care, declined need to see WOCN.  Pt inquiring about plan and hoping for MRI to be done today, BRAN paged crs NP and informed of pt's request.  GI and GYN consulting.      Anticipated dc plan - home with family support    PCP - Kalia Astorga MD     [facility] -   [facility]/pharm[facility] #[id number] - [place] - [address]  [address]  [place] [address]  Phone: [phone] Fax: [phone]    [facility] (prev. [facility]) - [place] - [address]  [address]  [address]  [place] [address]  Phone: [phone] Fax: [phone]    Payor: BLUE [employer]OSS [employer] / Plan: BLUE [employer]OSS [employer] / Product Type: Self Funded /         11/22/17 0829   Discharge Assessment   Assessment Type Discharge Planning Assessment   Confirmed/corrected address and phone number on facesheet? Yes   Assessment information obtained from? Patient;Caregiver;Medical Record   Prior to hospitilization cognitive status: Alert/Oriented   Prior to hospitalization functional status: Independent   Current cognitive status: Alert/Oriented   Current Functional Status: Independent   Lives With child(katalina), dependent   Able to Return to Prior Arrangements yes   Is patient able to care for self after discharge? Yes   Who are your caregiver(s) and their phone number(s)? Mother, Shaila Salgado,  [phone]   Readmission Within The Last 30 Days no previous admission in last 30 days   Patient currently being followed by outpatient case management? Yes   Equipment Currently Used at Home colostomy/ostomy supplies  (Pt denied need to see WOCN)   Do you have any problems affording any of your prescribed medications? No   Is the patient taking medications as prescribed? yes   Does the patient have transportation home? Yes   Transportation Available family or friend will provide   Does the patient receive services at the  [facility]? No   Discharge Plan A Home with family   Discharge Plan B Home Health   Patient/Family In Agreement With Plan yes

## 2017-11-22 NOTE — CONSULTS
[facility]  Colorectal Surgery  Consult Note    Patient Name: Ivy Salgado  MRN: [id number]  Admission Date: 11/20/2017  Hospital Length of Stay: 2 days  Attending Physician: Kailash Calderon MD  Primary Care Provider: Kalia Astorga MD    Inpatient consult to Gynecology  Consult performed by: ERICH KITCHEN  Consult ordered by: ERICH KITCHEN  Assessment/Recommendations: Please see consult 10/21/17        Subjective:

## 2017-11-22 NOTE — NURSING
While speaking with pt, she stated that she was supposed to have an MRI performed a few weeks ago, but it was not done due to insurance issues. Pt is requesting to have an MRI done while in the hospital. Pt also stated that CT scans and lab work have not been reliable in the past in determining the cause for her pain. Will speak to the on-call physician for Dr. Calderon to possibly have an MRI ordered for pt.

## 2017-11-22 NOTE — PLAN OF CARE
Problem: Patient Care Overview  Goal: Plan of Care Review  Outcome: Ongoing (interventions implemented as appropriate)  Pt free of falls or injury during shift. VSS, afebrile, AAOx4. Pain assessed and treated per MD orders. Intermittent nausea treated per MD orders Bed locked and in lowest position, side rails raised x2, call light and personal belongings within reach. Will continue to monitor.    Problem: Infection, Risk/Actual (Adult)  Goal: Identify Related Risk Factors and Signs and Symptoms  Related risk factors and signs and symptoms are identified upon initiation of Human Response Clinical Practice Guideline (CPG)   Outcome: Ongoing (interventions implemented as appropriate)  Pt afebrile during shift. C/o intermittent nausea, treated with PRN antiemetics

## 2017-11-23 VITALS
RESPIRATION RATE: 18 BRPM | OXYGEN SATURATION: 98 % | DIASTOLIC BLOOD PRESSURE: 72 MMHG | SYSTOLIC BLOOD PRESSURE: 110 MMHG | BODY MASS INDEX: 17.9 KG/M2 | TEMPERATURE: 98 F | WEIGHT: 94.81 LBS | HEIGHT: 61 IN | HEART RATE: 81 BPM

## 2017-11-23 LAB
ANION GAP SERPL CALC-SCNC: 9 MMOL/L
BASOPHILS # BLD AUTO: 0.02 K/UL
BASOPHILS NFR BLD: 0.3 %
BUN SERPL-MCNC: 5 MG/DL
CALCIUM SERPL-MCNC: 8.9 MG/DL
CHLORIDE SERPL-SCNC: 102 MMOL/L
CO2 SERPL-SCNC: 27 MMOL/L
CREAT SERPL-MCNC: 0.8 MG/DL
DIFFERENTIAL METHOD: ABNORMAL
EOSINOPHIL # BLD AUTO: 0.1 K/UL
EOSINOPHIL NFR BLD: 0.9 %
ERYTHROCYTE [DISTWIDTH] IN BLOOD BY AUTOMATED COUNT: 13.8 %
EST. GFR  (AFRICAN AMERICAN): >60 ML/MIN/1.73 M^2
EST. GFR  (NON AFRICAN AMERICAN): >60 ML/MIN/1.73 M^2
GLUCOSE SERPL-MCNC: 99 MG/DL
HCT VFR BLD AUTO: 34.7 %
HGB BLD-MCNC: 11.8 G/DL
IMM GRANULOCYTES # BLD AUTO: 0.03 K/UL
IMM GRANULOCYTES NFR BLD AUTO: 0.5 %
LYMPHOCYTES # BLD AUTO: 1.9 K/UL
LYMPHOCYTES NFR BLD: 29 %
MAGNESIUM SERPL-MCNC: 1.6 MG/DL
MCH RBC QN AUTO: 30.3 PG
MCHC RBC AUTO-ENTMCNC: 34 G/DL
MCV RBC AUTO: 89 FL
MONOCYTES # BLD AUTO: 0.8 K/UL
MONOCYTES NFR BLD: 12.9 %
NEUTROPHILS # BLD AUTO: 3.6 K/UL
NEUTROPHILS NFR BLD: 56.4 %
NRBC BLD-RTO: 0 /100 WBC
PHOSPHATE SERPL-MCNC: 4 MG/DL
PLATELET # BLD AUTO: 264 K/UL
PMV BLD AUTO: 9.2 FL
POTASSIUM SERPL-SCNC: 3.8 MMOL/L
RBC # BLD AUTO: 3.9 M/UL
SODIUM SERPL-SCNC: 138 MMOL/L
WBC # BLD AUTO: 6.38 K/UL

## 2017-11-23 PROCEDURE — 80048 BASIC METABOLIC PNL TOTAL CA: CPT

## 2017-11-23 PROCEDURE — 25000003 PHARM REV CODE 250: Performed by: STUDENT IN AN ORGANIZED HEALTH CARE EDUCATION/TRAINING PROGRAM

## 2017-11-23 PROCEDURE — 63600175 PHARM REV CODE 636 W HCPCS: Performed by: STUDENT IN AN ORGANIZED HEALTH CARE EDUCATION/TRAINING PROGRAM

## 2017-11-23 PROCEDURE — 25000003 PHARM REV CODE 250: Performed by: NURSE PRACTITIONER

## 2017-11-23 PROCEDURE — 63600175 PHARM REV CODE 636 W HCPCS: Performed by: COLON & RECTAL SURGERY

## 2017-11-23 PROCEDURE — 83735 ASSAY OF MAGNESIUM: CPT

## 2017-11-23 PROCEDURE — 85025 COMPLETE CBC W/AUTO DIFF WBC: CPT

## 2017-11-23 PROCEDURE — 84100 ASSAY OF PHOSPHORUS: CPT

## 2017-11-23 RX ORDER — OXYCODONE AND ACETAMINOPHEN 10; 325 MG/1; MG/1
1 TABLET ORAL EVERY 6 HOURS PRN
Qty: 41 TABLET | Refills: 0 | Status: SHIPPED | OUTPATIENT
Start: 2017-11-23 | End: 2017-11-30 | Stop reason: SDUPTHER

## 2017-11-23 RX ORDER — HEPARIN SODIUM,PORCINE/PF 10 UNIT/ML
5 SYRINGE (ML) INTRAVENOUS ONCE
Status: COMPLETED | OUTPATIENT
Start: 2017-11-23 | End: 2017-11-23

## 2017-11-23 RX ORDER — HEPARIN SODIUM,PORCINE 10 UNIT/ML
5 VIAL (ML) INTRAVENOUS ONCE
Status: DISCONTINUED | OUTPATIENT
Start: 2017-11-23 | End: 2017-11-23 | Stop reason: HOSPADM

## 2017-11-23 RX ORDER — METRONIDAZOLE 500 MG/1
500 TABLET ORAL EVERY 12 HOURS
Qty: 8 TABLET | Refills: 0 | Status: SHIPPED | OUTPATIENT
Start: 2017-11-23 | End: 2017-11-27

## 2017-11-23 RX ORDER — MAGNESIUM SULFATE HEPTAHYDRATE 40 MG/ML
2 INJECTION, SOLUTION INTRAVENOUS ONCE
Status: DISCONTINUED | OUTPATIENT
Start: 2017-11-23 | End: 2017-11-23

## 2017-11-23 RX ADMIN — OXYCODONE AND ACETAMINOPHEN 1 TABLET: 10; 325 TABLET ORAL at 11:11

## 2017-11-23 RX ADMIN — HYDROMORPHONE HYDROCHLORIDE 0.5 MG: 1 INJECTION, SOLUTION INTRAMUSCULAR; INTRAVENOUS; SUBCUTANEOUS at 08:11

## 2017-11-23 RX ADMIN — VALACYCLOVIR HYDROCHLORIDE 500 MG: 500 TABLET, FILM COATED ORAL at 08:11

## 2017-11-23 RX ADMIN — PROMETHAZINE HYDROCHLORIDE 12.5 MG: 12.5 TABLET ORAL at 01:11

## 2017-11-23 RX ADMIN — METRONIDAZOLE 500 MG: 500 TABLET ORAL at 08:11

## 2017-11-23 RX ADMIN — PROMETHAZINE HYDROCHLORIDE 12.5 MG: 12.5 TABLET ORAL at 08:11

## 2017-11-23 RX ADMIN — ONDANSETRON 8 MG: 8 TABLET, ORALLY DISINTEGRATING ORAL at 06:11

## 2017-11-23 RX ADMIN — DRONABINOL 2.5 MG: 2.5 CAPSULE ORAL at 06:11

## 2017-11-23 RX ADMIN — OXYCODONE AND ACETAMINOPHEN 1 TABLET: 10; 325 TABLET ORAL at 06:11

## 2017-11-23 RX ADMIN — MAGNESIUM SULFATE HEPTAHYDRATE 1 G: 500 INJECTION, SOLUTION INTRAMUSCULAR; INTRAVENOUS at 08:11

## 2017-11-23 RX ADMIN — HYDROMORPHONE HYDROCHLORIDE 0.5 MG: 1 INJECTION, SOLUTION INTRAMUSCULAR; INTRAVENOUS; SUBCUTANEOUS at 12:11

## 2017-11-23 RX ADMIN — ZOLPIDEM TARTRATE 10 MG: 5 TABLET, FILM COATED ORAL at 01:11

## 2017-11-23 RX ADMIN — HEPARIN, PORCINE (PF) 10 UNIT/ML INTRAVENOUS SYRINGE 50 UNITS: at 04:11

## 2017-11-23 RX ADMIN — HYDROMORPHONE HYDROCHLORIDE 0.5 MG: 1 INJECTION, SOLUTION INTRAMUSCULAR; INTRAVENOUS; SUBCUTANEOUS at 03:11

## 2017-11-23 NOTE — PROGRESS NOTES
[facility]  Colorectal Surgery  Progress Note    Patient Name: Ivy Salgado  MRN: [id number]  Admission Date: 11/20/2017  Hospital Length of Stay: 2 days  Attending Physician: Kailash Calderon MD    Subjective:     Interval History: PHIL overnight. Feels dehydrated. No nausea or vomiting. Pain is similar to yesterday. Having ostomy output.     Post-Op Info:  * No surgery found *          Medications:  Continuous Infusions:   lactated ringers 50 mL/hr at 11/22/17 1113     Scheduled Meds:   dronabinol  2.5 mg Oral BID AC    enoxaparin  40 mg Subcutaneous Daily    heparin, porcine (PF)  0.5 mL Intravenous Once    metroNIDAZOLE  500 mg Oral Q12H    valACYclovir  500 mg Oral Daily     PRN Meds:   acetaminophen    dextrose 50%    dextrose 50%    glucagon (human recombinant)    glucose    glucose    HYDROmorphone    ondansetron    oxyCODONE-acetaminophen    promethazine    sodium chloride 0.9%    zolpidem        Objective:     Vital Signs (Most Recent):  Temp: 98 °F (36.7 °C) (11/22/17 1558)  Pulse: 75 (11/22/17 1558)  Resp: 20 (11/22/17 1558)  BP: 133/87 (11/22/17 1558)  SpO2: 99 % (11/22/17 1558) Vital Signs (24h Range):  Temp:  [98 °F (36.7 °C)-99.6 °F (37.6 °C)] 98 °F (36.7 °C)  Pulse:  [63-96] 75  Resp:  [16-20] 20  SpO2:  [96 %-99 %] 99 %  BP: (130-146)/(87-97) 133/87     Intake/Output - Last 3 Shifts       11/20 0700 - 11/21 0659 11/21 0700 - 11/22 0659 11/22 0700 - 11/23 0659    P.O. 220  240    Total Intake(mL/kg) 220 (5.1)  240 (5.6)    Urine (mL/kg/hr)   650 (1.3)    Stool   225 (0.5)    Total Output     875    Net +220   -635           Urine Occurrence 2 x 4 x     Stool Occurrence  3 x           Physical Exam   Constitutional: She is oriented to person, place, and time. She appears well-developed and well-nourished.   HENT:   Head: Normocephalic and atraumatic.   Eyes: EOM are normal.   Cardiovascular: Normal rate.    Pulmonary/Chest: Effort normal.   Abdominal: Soft. She  exhibits distension (mild). She exhibits no mass. There is tenderness (in RLQ). There is no rebound and no guarding. No hernia.   Previous midline incision well healed. Ostomy with output in bag.    Musculoskeletal: Normal range of motion.   Neurological: She is alert and oriented to person, place, and time.   Skin: Skin is warm. Capillary refill takes less than 2 seconds.   Psychiatric: She has a normal mood and affect. Her behavior is normal.   Nursing note and vitals reviewed.    Significant Labs:  BMP (Last 3 Results):   Recent Labs  Lab 11/20/17  0705 11/21/17  0516 11/22/17  0410   GLU 98 93 91    138 140   K 3.3* 3.9 3.5    107 105   CO2 25 24 27   BUN 10 3* 3*   CREATININE 0.8 0.7 0.7   CALCIUM 9.8 9.0 8.9   MG  --  1.7 1.7     CBC (Last 3 Results):   Recent Labs  Lab 11/20/17  0705 11/21/17  0516 11/22/17  0410   WBC 16.80* 6.81 5.57   RBC 4.45 3.99* 4.12   HGB 13.6 12.1 12.0   HCT 39.4 35.8* 36.5*    258 275   MCV 89 90 89   MCH 30.6 30.3 29.1   MCHC 34.5 33.8 32.9       Significant Diagnostics:  None    Assessment/Plan:     Crohn's disease    37 year old female with hx of crohn's, post op TAC with end ileostomy and SHELLIE admitted with RLQ pain with CT showing adnexal cystic lesion. No evidence of obstruction.     - Regular diet as tolerated  - Check prealbumin for nutrition status  - Appreciate GI consultation  - MR enterography today   - Will discuss BV treatment with gyn                 Pamella Hill MD  Colorectal Surgery  [facility]

## 2017-11-23 NOTE — PLAN OF CARE
Problem: Patient Care Overview  Goal: Plan of Care Review  Outcome: Outcome(s) achieved Date Met: 11/23/17  Pt to be D/C to home via personal transport. Father at BS.    VSS and afebrile. Free from injury and falls. AOx4.    AVS to be provided c/ pertinent F/U info indicated. Rx paper provided for narc med.

## 2017-11-23 NOTE — NURSING
Pt notified of D/C orders. Requesting to speak CRS. Pt to update writer c/ time of avail transport.    Dr. Hill responded to page. Will inform Resident to round on pt.

## 2017-11-23 NOTE — DISCHARGE SUMMARY
"[facility]  Colorectal Surgery  Discharge Summary      Patient Name: Ivy Salgado  MRN: [id number]  Admission Date: 11/20/2017  Hospital Length of Stay: 3 days  Discharge Date and Time:  11/23/2017 12:06 PM  Attending Physician: Kailash Calderon MD   Discharging Provider: Doug Hadley MD  Primary Care Provider: Kalia Astorga MD     HPI:  The patient is a 37 y.o. female with hx of:Crohn's disease, kidney stone, anxiety, HTN, and genital HSV  that presents to the ED for evaluation of intermittent RLQ abdominal pain radiating to her back since 3:00 AM. She reports vomiting. Denies fever or chills. Had TAC with end ileostomy for Crohns 6/12/12 with long hospital stay.  Sees Dr. Ross for Crohns.  Had SHELLIE by Dr. Morales 4/16/15 with findings of  Distorted pelvic anatomy with adhesions of omentum and bladder to anterior abdominal wall, right tube and ovary not visualized.  A separate entities, but adhered to the right pelvic sidewall with uterus pulling towards the right side.  The left adnexa could not be identified or palpated and adhesions of posterior uterine wall and posterior cervix. She tells me she has had intermittent vomiting with almost constant nausea for almost a year, "ever since I have been started on Stelera", has been receiving IVF 2-3/week.  Last MRE 4/27/17Active Inflammatory bowel disease without luminal narrowing, last ileoscope done 8/18/17:   Scope advanced to 15-20 cm proximal to the stoma; appeared normal. Unable to advance further due to tight angulation of intestines. iopsies were taken with a cold forceps for histology.(no active colitis),  There was evidence of a widely patent end ileostomy found in the ileostomy. This was characterized by healthy appearing mucosa. Today she says ostomy output has decreased and is a thin brown drainage, says she realluy has not been eating over the last few days.   Patient states she has been having flare ups since February after " being on stelara. She reports kidney stones on her left side. No further concerns or complaints at this time.    * No surgery found *     Hospital Course:  Pt seen in ER, still with nausea and pain despite several doses of Zofran and dilaudid.  CT a/p 11/20/17:    1. Right adnexal mass abutting the right external iliac vein.  In this patient with a reported history of right salpingo-oophorectomy, this may represent a cystic ovarian remnant or necrotic node with abscess felt to be less likely.  Consider pelvic ultrasound for further evaluation.  2.  Bilateral renal hypodensities and calcifications without hydronephrosis.  3.  No significant inflammatory bowel disease identified.  Postoperative changes of prior colectomy and right lower quadrant ileostomy.  Scans reviewed by Dr. Calderon.  Pelvic US:  preliminary report right ovarian cyst.  Discussed with ER MD.    MRI was done 11/22 and results were discussed with GI, and they plan to follow up with her as an outpatient. Patient's pain was improving. Deemed suitable for discharge on 11/23    Consults         Status Ordering Provider     Inpatient consult to Colorectal Surgery  Once     Provider:  (Not yet assigned)    Completed LINNEA LUCAS III     Inpatient consult to Gastroenterology  Once     Provider:  (Not yet assigned)    Completed JHON ORTIZ     Inpatient consult to Gynecology  Once     Provider:  (Not yet assigned)    Completed ERICH KITCHEN          Significant Diagnostic Studies: Labs: All labs within the past 24 hours have been reviewed    Pending Diagnostic Studies:     None        Final Active Diagnoses:    Diagnosis Date Noted POA    PRINCIPAL PROBLEM:  Abdominal pain [R10.9] 10/12/2016 Yes    Nausea & vomiting [R11.2] 11/21/2017 Yes    Granulomatous colitis [K50.10] 11/20/2017 Yes    Appetite impaired [R63.0] 11/20/2017 Unknown    Nausea [R11.0] 10/13/2016 Yes    Essential hypertension, benign [I10] 03/17/2016 Yes     Chronic    Ovarian  cyst [N83.209] 03/16/2015 Yes    Herpes genitalis in women [A60.09] 12/23/2014 Yes    Crohn's disease [K50.90] 09/18/2013 Yes     Chronic    Generalized anxiety disorder [F41.1] 09/18/2013 Yes      Problems Resolved During this Admission:    Diagnosis Date Noted Date Resolved POA      Discharged Condition: good    Disposition: Home or Self Care    Follow Up:  Follow-up Information     Bob Parsons MD In 1 month.    Specialty:  Colon and Rectal Surgery  Contact information:  [address]  [facility] [id number]  [phone]                 Patient Instructions:     Diet general   Order Comments: Protein shakes as tolerated     Activity as tolerated     Call MD for:  temperature >100.4     Call MD for:  persistent nausea and vomiting or diarrhea     Call MD for:  severe uncontrolled pain     Call MD for:  difficulty breathing or increased cough     Call MD for:  severe persistent headache     Call MD for:  worsening rash     Call MD for:  persistent dizziness, light-headedness, or visual disturbances     Call MD for:  increased confusion or weakness       Medications:  Reconciled Home Medications:   Current Discharge Medication List      CONTINUE these medications which have CHANGED    Details   !! oxyCODONE-acetaminophen (PERCOCET)  mg per tablet Take 1 tablet by mouth every 6 (six) hours as needed for Pain.  Qty: 41 tablet, Refills: 0       !! - Potential duplicate medications found. Please discuss with provider.      CONTINUE these medications which have NOT CHANGED    Details   acetaminophen (TYLENOL) 650 MG TbSR Take 650 mg by mouth daily as needed (fever/pain).      ALPRAZolam (XANAX) 0.5 MG tablet Take 1 tablet (0.5 mg total) by mouth 2 (two) times daily.  Qty: 60 tablet, Refills: 0      diphenoxylate-atropine 2.5-0.025 mg (LOMOTIL) 2.5-0.025 mg per tablet TAKE 1 TABLET BY MOUTH 3 TIMES A DAY AS NEEDED FOR DIARRHEA  Qty: 90 tablet, Refills: 0    Comments: This request is for a new  prescription for a controlled substance as required by Federal/State law.      dronabinol (MARINOL) 2.5 MG capsule Take 1 capsule (2.5 mg total) by mouth 2 (two) times daily before meals.  Qty: 60 capsule, Refills: 3    Associated Diagnoses: Crohn's disease of both small and large intestine with complication; Encounter for long-term (current) use of high-risk medication      IBUPROFEN ORAL Take 400-600 mg by mouth daily as needed (fever/pain).      lisinopril 10 MG tablet TAKE 1 TABLET (10 MG TOTAL) BY MOUTH ONCE DAILY.  Qty: 90 tablet, Refills: 2      loperamide (IMODIUM) 2 mg capsule Take 2 mg by mouth daily as needed for Diarrhea.      ondansetron (ZOFRAN-ODT) 8 MG TbDL TAKE 1 TABLET (8 MG TOTAL) BY MOUTH EVERY 8 (EIGHT) HOURS AS NEEDED (NAUSEA).  Qty: 30 tablet, Refills: 0      !! oxyCODONE-acetaminophen (PERCOCET)  mg per tablet Take 1 tablet by mouth every 6 (six) hours as needed for Pain.  Qty: 40 tablet, Refills: 0    Associated Diagnoses: Crohn's disease of small and large intestines with complication      promethazine (PHENERGAN) 25 MG tablet TAKE 1 TABLET BY MOUTH EVERY 6 HOURS AS NEEDED FOR NAUSEA  Qty: 30 tablet, Refills: 3      sumatriptan (IMITREX) 100 MG tablet Take 1 tablet (100 mg total) by mouth every 2 (two) hours as needed for Migraine (do not exceed 2 doses in 24 hours).  Qty: 9 tablet, Refills: 1      tacrolimus (PROTOPIC) 0.1 % ointment Apply 1 application topically 2 (two) times daily as needed (for rash on face).   Refills: 4      ustekinumab (STELARA) 130 mg/26 mL injection Inject into the skin every 8 weeks.       valacyclovir (VALTREX) 500 MG tablet TAKE 1 TABLET (500 MG TOTAL) BY MOUTH ONCE DAILY.  Qty: 30 tablet, Refills: 0      zolpidem (AMBIEN) 10 mg Tab Take 1 tablet (10 mg total) by mouth every evening.  Qty: 30 tablet, Refills: 0       !! - Potential duplicate medications found. Please discuss with provider.          Doug Hadley MD  Colorectal Surgery  [facility]  [place]

## 2017-11-23 NOTE — TREATMENT PLAN
GI Treatment Plan    Patient's pain and symptoms have improved. MRE done yesterday with findings of likely fibrotic stricture in short segment of small bowel within the right hemipelvis with mild dilatation of upstream bowel.    Recommendations:  - Ok to be discharged from GI standpoint  - Low residue (low fiber diet)  - Will have follow up in GI clinic with Dr. Ross and may need surgical resection of this stenotic area    Please page if any additional questions or concerns.    Fox Tai MD PGY-IV  Gastroenterology Fellow  [facility]  [phone]

## 2017-11-23 NOTE — SUBJECTIVE & OBJECTIVE
Subjective:     Interval History: PHIL overnight. Feels dehydrated. No nausea or vomiting. Pain is similar to yesterday. Having ostomy output.     Post-Op Info:  * No surgery found *          Medications:  Continuous Infusions:   lactated ringers 50 mL/hr at 11/22/17 1113     Scheduled Meds:   dronabinol  2.5 mg Oral BID AC    enoxaparin  40 mg Subcutaneous Daily    heparin, porcine (PF)  0.5 mL Intravenous Once    metroNIDAZOLE  500 mg Oral Q12H    valACYclovir  500 mg Oral Daily     PRN Meds:   acetaminophen    dextrose 50%    dextrose 50%    glucagon (human recombinant)    glucose    glucose    HYDROmorphone    ondansetron    oxyCODONE-acetaminophen    promethazine    sodium chloride 0.9%    zolpidem        Objective:     Vital Signs (Most Recent):  Temp: 98 °F (36.7 °C) (11/22/17 1558)  Pulse: 75 (11/22/17 1558)  Resp: 20 (11/22/17 1558)  BP: 133/87 (11/22/17 1558)  SpO2: 99 % (11/22/17 1558) Vital Signs (24h Range):  Temp:  [98 °F (36.7 °C)-99.6 °F (37.6 °C)] 98 °F (36.7 °C)  Pulse:  [63-96] 75  Resp:  [16-20] 20  SpO2:  [96 %-99 %] 99 %  BP: (130-146)/(87-97) 133/87     Intake/Output - Last 3 Shifts       11/20 0700 - 11/21 0659 11/21 0700 - 11/22 0659 11/22 0700 - 11/23 0659    P.O. 220  240    Total Intake(mL/kg) 220 (5.1)  240 (5.6)    Urine (mL/kg/hr)   650 (1.3)    Stool   225 (0.5)    Total Output     875    Net +220   -635           Urine Occurrence 2 x 4 x     Stool Occurrence  3 x           Physical Exam   Constitutional: She is oriented to person, place, and time. She appears well-developed and well-nourished.   HENT:   Head: Normocephalic and atraumatic.   Eyes: EOM are normal.   Cardiovascular: Normal rate.    Pulmonary/Chest: Effort normal.   Abdominal: Soft. She exhibits distension (mild). She exhibits no mass. There is tenderness (in RLQ). There is no rebound and no guarding. No hernia.   Previous midline incision well healed. Ostomy with output in bag.    Musculoskeletal:  Normal range of motion.   Neurological: She is alert and oriented to person, place, and time.   Skin: Skin is warm. Capillary refill takes less than 2 seconds.   Psychiatric: She has a normal mood and affect. Her behavior is normal.   Nursing note and vitals reviewed.    Significant Labs:  BMP (Last 3 Results):   Recent Labs  Lab 11/20/17 0705 11/21/17 0516 11/22/17 0410   GLU 98 93 91    138 140   K 3.3* 3.9 3.5    107 105   CO2 25 24 27   BUN 10 3* 3*   CREATININE 0.8 0.7 0.7   CALCIUM 9.8 9.0 8.9   MG  --  1.7 1.7     CBC (Last 3 Results):   Recent Labs  Lab 11/20/17 0705 11/21/17  0516 11/22/17  0410   WBC 16.80* 6.81 5.57   RBC 4.45 3.99* 4.12   HGB 13.6 12.1 12.0   HCT 39.4 35.8* 36.5*    258 275   MCV 89 90 89   MCH 30.6 30.3 29.1   MCHC 34.5 33.8 32.9       Significant Diagnostics:  None

## 2017-11-23 NOTE — ASSESSMENT & PLAN NOTE
37 year old female with hx of crohn's, post op TAC with end ileostomy and SHELLIE admitted with RLQ pain with CT showing adnexal cystic lesion. No evidence of obstruction.     - Regular diet as tolerated  - Check prealbumin for nutrition status  - Appreciate GI consultation  - MR enterography today   - Will discuss BV treatment with gyn

## 2017-11-23 NOTE — PROGRESS NOTES
Due to pt anxiety order obtained for ativan 1 mg IVP given and additional Ativan 1mg IVP given per orders / pt having anxiety and also emotional about MRI and current condition / pt's nurse to chart in [facility] for ativan administration times

## 2017-11-26 LAB
BACTERIA BLD CULT: NORMAL
BACTERIA BLD CULT: NORMAL

## 2017-11-27 ENCOUNTER — TELEPHONE (OUTPATIENT)
Dept: PHARMACY | Facility: CLINIC | Age: 37
End: 2017-11-27

## 2017-11-28 ENCOUNTER — TELEPHONE (OUTPATIENT)
Dept: OBSTETRICS AND GYNECOLOGY | Facility: CLINIC | Age: 37
End: 2017-11-28

## 2017-11-28 ENCOUNTER — PATIENT MESSAGE (OUTPATIENT)
Dept: INTERNAL MEDICINE | Facility: CLINIC | Age: 37
End: 2017-11-28

## 2017-11-28 ENCOUNTER — PATIENT MESSAGE (OUTPATIENT)
Dept: OBSTETRICS AND GYNECOLOGY | Facility: CLINIC | Age: 37
End: 2017-11-28

## 2017-11-28 ENCOUNTER — PATIENT MESSAGE (OUTPATIENT)
Dept: GASTROENTEROLOGY | Facility: CLINIC | Age: 37
End: 2017-11-28

## 2017-11-28 RX ORDER — PREDNISONE 10 MG/1
10 TABLET ORAL 2 TIMES DAILY
Qty: 40 TABLET | Refills: 0 | Status: SHIPPED | OUTPATIENT
Start: 2017-11-28 | End: 2017-12-08

## 2017-11-28 RX ORDER — FLUCONAZOLE 150 MG/1
150 TABLET ORAL DAILY
Qty: 1 TABLET | Refills: 1 | Status: SHIPPED | OUTPATIENT
Start: 2017-11-28 | End: 2017-12-21 | Stop reason: SDUPTHER

## 2017-11-28 NOTE — TELEPHONE ENCOUNTER
Spoke with patient, she is worried because she had an ultrasound, ct and mri done last week, she says they see a cyst on her right side, she states she had her right ovary removed, she wants to know if this is worth being worried about.    Patient's message on portal:    I was in the hospital last week.  Had an ultrasound, ct and mri.  They noted the right cyst a few times. Just wanted to let you know and see if it was worth worrying about     Please advise

## 2017-11-29 ENCOUNTER — OUTPATIENT CASE MANAGEMENT (OUTPATIENT)
Dept: ADMINISTRATIVE | Facility: OTHER | Age: 37
End: 2017-11-29

## 2017-11-29 ENCOUNTER — PATIENT MESSAGE (OUTPATIENT)
Dept: INTERNAL MEDICINE | Facility: CLINIC | Age: 37
End: 2017-11-29

## 2017-11-29 RX ORDER — VALACYCLOVIR HYDROCHLORIDE 500 MG/1
500 TABLET, FILM COATED ORAL DAILY
Qty: 90 TABLET | Refills: 1 | Status: SHIPPED | OUTPATIENT
Start: 2017-11-29 | End: 2018-01-26

## 2017-11-29 NOTE — PROGRESS NOTES
11/29/2017:   spoke to patient via telephone and notified her that Jairo eDnnison reported that this patient did qualify for financial services.  Notified patient of this and the fact that she may have to re-apply for services every month as per department policy. Provided patient with Sowmya Carty, in financial assistance, telephone number and email address.  She has also applied for disability with the government.  Will close case at this time.          Plan for next encounter:  1. Follow up with Financial Assistance -11/28/2017  2. Assess for additional needs-11/29/2017

## 2017-11-29 NOTE — PROGRESS NOTES
11/29/2017  RN-BRAN contacted patient via 3-way call with OPCM-Jackson LEBRON to follow up for OPCM. Ms. Salgado reports that she is feeling better since her admit/discharge from the hospital on 11/23/17 for nausea/vomiting. Follow up appointments have been scheduled for Urology and Gastro. VI informed patient that she has been approved for Patient Assistance Fund (per Jairo Dennison) to cover her medical co-pays and was informed that she may have to re-apply for these services every month as per department policy. VI provided patient with phone number and e-mail address for Sowmya Carty in financial assistance. Patient has also applied for disability with the [employer]. Patient states that she will follow up with Sowmya Carty to make sure that she keeps up with the re-application process. Patient states that she is having difficulty getting an appointment for her weekly IVF infusion at the [facility]fusion Department. She states that she goes weekly, but on occasion, has had to miss a week due to leaving messages for appointments and not getting a call back. I will send IM and in-basket message to María Frey RN Supervisor/Operation Coordinator for the [facility] and ask her to reach out to patient to assist her in scheduling series appointments. Informed Ms. Salgado that I would attempt to reach María Frey, and asked that patient let me know if she received a call from María; patient verbalized understanding. I confirmed with patient that her weekly Stelara continues to have a zero co-pay. Patient states that she is also taking 20mg of Prednisone daily since her discharge to assist in getting over this most recent flare-up that resulted in her recent hospitalization. Will continue to follow until all services are in place.     Follow Up Plan:  - Follow up with María Frey RN regarding scheduling weekly series appointments for patient's IVS's.  - Plan to close case when  above issue is resolved.

## 2017-11-30 ENCOUNTER — PATIENT MESSAGE (OUTPATIENT)
Dept: OBSTETRICS AND GYNECOLOGY | Facility: CLINIC | Age: 37
End: 2017-11-30

## 2017-11-30 ENCOUNTER — OFFICE VISIT (OUTPATIENT)
Dept: OPTOMETRY | Facility: CLINIC | Age: 37
End: 2017-11-30
Payer: COMMERCIAL

## 2017-11-30 ENCOUNTER — OFFICE VISIT (OUTPATIENT)
Dept: UROLOGY | Facility: CLINIC | Age: 37
End: 2017-11-30
Attending: UROLOGY
Payer: COMMERCIAL

## 2017-11-30 VITALS
DIASTOLIC BLOOD PRESSURE: 82 MMHG | WEIGHT: 90.5 LBS | HEART RATE: 72 BPM | BODY MASS INDEX: 17.09 KG/M2 | HEIGHT: 61 IN | SYSTOLIC BLOOD PRESSURE: 125 MMHG

## 2017-11-30 DIAGNOSIS — R82.991 HYPOCITRATURIA: ICD-10-CM

## 2017-11-30 DIAGNOSIS — E72.9: ICD-10-CM

## 2017-11-30 DIAGNOSIS — Z87.440 HISTORY OF RECURRENT UTIS: ICD-10-CM

## 2017-11-30 DIAGNOSIS — H52.11 MYOPIC ASTIGMATISM, RIGHT: Primary | ICD-10-CM

## 2017-11-30 DIAGNOSIS — H52.201 MYOPIC ASTIGMATISM, RIGHT: Primary | ICD-10-CM

## 2017-11-30 DIAGNOSIS — R34 DECREASED URINE VOLUME: ICD-10-CM

## 2017-11-30 DIAGNOSIS — N83.209 CYST OF OVARY, UNSPECIFIED LATERALITY: Primary | ICD-10-CM

## 2017-11-30 DIAGNOSIS — N20.0 NEPHROLITHIASIS: Primary | ICD-10-CM

## 2017-11-30 PROCEDURE — 99215 OFFICE O/P EST HI 40 MIN: CPT | Mod: S$GLB,,, | Performed by: UROLOGY

## 2017-11-30 PROCEDURE — 92004 COMPRE OPH EXAM NEW PT 1/>: CPT | Mod: S$GLB,,, | Performed by: OPTOMETRIST

## 2017-11-30 PROCEDURE — 92015 DETERMINE REFRACTIVE STATE: CPT | Mod: S$GLB,,, | Performed by: OPTOMETRIST

## 2017-11-30 PROCEDURE — 99999 PR PBB SHADOW E&M-EST. PATIENT-LVL III: CPT | Mod: PBBFAC,,, | Performed by: OPTOMETRIST

## 2017-11-30 RX ORDER — POTASSIUM CITRATE 15 MEQ/1
2 TABLET, EXTENDED RELEASE ORAL 2 TIMES DAILY
Qty: 360 TABLET | Refills: 3 | Status: ON HOLD | OUTPATIENT
Start: 2017-11-30 | End: 2018-02-06

## 2017-11-30 NOTE — PROGRESS NOTES
HPI      is here for annual eye exam.   Pt sts headaches and blurry vision at a distance  Yarsanism MOFFETT's evenings    (-)Flashes (-)Floaters  (-)Itch, (-)tear, (-)burn, (-)Dryness. (-) OTC Drops   (-)Photophobia  (-)Glare (-)diplopia (+) headaches          Last edited by Dony Coffey, OD on 11/30/2017  1:35 PM. (History)        Vision Exam    Assessment /Plan     For exam results, see Encounter Report.    Myopic astigmatism, right  -no sRx necessary      RTC 1 yr

## 2017-11-30 NOTE — PROGRESS NOTES
"Subjective:      Ivy Salgado is a 37 y.o. female who returns today regarding her UTIs.    Full history detailed elsewhere.    No UTIs since last visit. She has had at least 3 this year.     Here today to review metabolic stone workup. No new c/o.     Still not performing CIC and continues to have low UOP.    The following portions of the patient's history were reviewed and updated as appropriate: allergies, current medications, past family history, past medical history, past social history, past surgical history and problem list.    Review of Systems  A comprehensive multipoint review of systems was negative except as otherwise stated in the HPI.     Objective:   Vitals: /82 (BP Location: Left arm, Patient Position: Sitting, BP Method: Large (Automatic))   Pulse 72   Ht 5' 1" (1.549 m)   Wt 41 kg (90 lb 8 oz)   LMP 03/30/2015   BMI 17.10 kg/m²     Physical Exam   General: alert and oriented, no acute distress  Respiratory: Symmetric expansion, non-labored breathing  Neuro: no gross deficits  Psych: normal judgment and insight, normal mood/affect and non-anxious    Lab Review     Lab Results   Component Value Date    WBC 6.38 11/23/2017    HGB 11.8 (L) 11/23/2017    HCT 34.7 (L) 11/23/2017    MCV 89 11/23/2017     11/23/2017     Lab Results   Component Value Date    CREATININE 0.8 11/23/2017    BUN 5 (L) 11/23/2017     Date: 11/2/17 -- Vol 0.50 L, Ca 95 mg/day, Ox 11 mg/day, Cit 132 mg/day, pH 5.319, Na 21, K 20, Mg 18, Cr 796     Imaging   Reviewed images from recent CT (w/ contrast) earlier this month. There is a small stone in LP of right kidney, but none noted on the left. No hydro or ureteral stones.    Assessment and Plan:   1. History of recurrent UTIs  -- Doubtful small renal stone contributing.  -- Discussed options such as prophylactic abx, but she wishes to avoid if possible  -- Monitor for now    2. Nephrolithiasis  -- Minimal stone burden on recent CT  -- Unlikely contributing to " UTIs so will avoid treatment for now  -- Reviewed metabolic workup with plan as per below; stone composition unknown  -- Start UroCit K 30 mEq BID  -- BMP in 1 mos  -- Repeat litholink in 3 mos    3. Low urine volume   -- Very low and very likely contributing to stone formation  -- Difficult to treat due to high output from ileostomy  -- Instructed in increase fluid intake, as much as possible, for goal of 2.5 - 3.0 L UOP per day (light yellow)     4. Hypocitraturia  -- Significance uncertain since stone composition unknown (small renal stones not seen on KUB)  -- Will start UroCit K 30mEq BID    5. Low urine pH  -- Significance uncertain since stone composition unknown (small renal stones not seen on KUB)  -- Will start UroCit K 30mEq BID    FU 3-4 mos to review repeat 24 hour urine

## 2017-12-07 ENCOUNTER — PATIENT MESSAGE (OUTPATIENT)
Dept: OBSTETRICS AND GYNECOLOGY | Facility: CLINIC | Age: 37
End: 2017-12-07

## 2017-12-07 RX ORDER — METRONIDAZOLE 500 MG/1
500 TABLET ORAL 3 TIMES DAILY
Qty: 21 TABLET | Refills: 0 | Status: SHIPPED | OUTPATIENT
Start: 2017-12-07 | End: 2018-01-07 | Stop reason: SDUPTHER

## 2017-12-11 DIAGNOSIS — K50.819 CROHN'S DISEASE OF SMALL AND LARGE INTESTINES WITH COMPLICATION: ICD-10-CM

## 2017-12-11 RX ORDER — ONDANSETRON 8 MG/1
8 TABLET, ORALLY DISINTEGRATING ORAL EVERY 8 HOURS PRN
Qty: 30 TABLET | Refills: 0 | Status: SHIPPED | OUTPATIENT
Start: 2017-12-11 | End: 2018-01-07 | Stop reason: SDUPTHER

## 2017-12-11 RX ORDER — SUMATRIPTAN SUCCINATE 100 MG/1
TABLET ORAL
Qty: 9 TABLET | Refills: 1 | Status: SHIPPED | OUTPATIENT
Start: 2017-12-11 | End: 2018-03-30 | Stop reason: SDUPTHER

## 2017-12-12 RX ORDER — ZOLPIDEM TARTRATE 10 MG/1
10 TABLET ORAL NIGHTLY
Qty: 30 TABLET | Refills: 0 | Status: SHIPPED | OUTPATIENT
Start: 2017-12-12 | End: 2018-01-09 | Stop reason: SDUPTHER

## 2017-12-12 RX ORDER — ALPRAZOLAM 0.5 MG/1
TABLET ORAL
Qty: 60 TABLET | Refills: 0 | Status: SHIPPED | OUTPATIENT
Start: 2017-12-12 | End: 2018-01-09 | Stop reason: SDUPTHER

## 2017-12-12 RX ORDER — OXYCODONE AND ACETAMINOPHEN 10; 325 MG/1; MG/1
1 TABLET ORAL EVERY 6 HOURS PRN
Qty: 40 TABLET | Refills: 0 | Status: SHIPPED | OUTPATIENT
Start: 2017-12-12 | End: 2018-01-09 | Stop reason: SDUPTHER

## 2017-12-15 ENCOUNTER — OUTPATIENT CASE MANAGEMENT (OUTPATIENT)
Dept: ADMINISTRATIVE | Facility: OTHER | Age: 37
End: 2017-12-15

## 2017-12-15 NOTE — PROGRESS NOTES
12/15/2017  RN-BRAN contacted patient for monitoring follow up. Patient states that she is doing okay and has set up her infusions as discussed at last call. As all nursing goals have been met and  has closed case, I will close case at this time. Patient denies any needs at this time and is in agreement with case closure. Confirmed that patient has OPCM contact information and OOC phone number. Encouraged Ms. Salgado to contact OPCM if any needs arise in the future. Verbalized understanding. Jabier Parsons, RN-OPCM

## 2017-12-20 ENCOUNTER — PATIENT MESSAGE (OUTPATIENT)
Dept: OBSTETRICS AND GYNECOLOGY | Facility: CLINIC | Age: 37
End: 2017-12-20

## 2017-12-21 RX ORDER — FLUCONAZOLE 150 MG/1
150 TABLET ORAL DAILY
Qty: 1 TABLET | Refills: 1 | Status: SHIPPED | OUTPATIENT
Start: 2017-12-21 | End: 2017-12-22

## 2018-01-02 DIAGNOSIS — K50.819 CROHN'S DISEASE OF BOTH SMALL AND LARGE INTESTINE WITH COMPLICATION: ICD-10-CM

## 2018-01-02 DIAGNOSIS — Z79.899 ENCOUNTER FOR LONG-TERM (CURRENT) USE OF HIGH-RISK MEDICATION: ICD-10-CM

## 2018-01-02 RX ORDER — DRONABINOL 2.5 MG/1
2.5 CAPSULE ORAL
Qty: 60 CAPSULE | Refills: 3 | Status: SHIPPED | OUTPATIENT
Start: 2018-01-02 | End: 2018-03-30 | Stop reason: SDUPTHER

## 2018-01-08 RX ORDER — ONDANSETRON 8 MG/1
8 TABLET, ORALLY DISINTEGRATING ORAL EVERY 8 HOURS PRN
Qty: 30 TABLET | Refills: 0 | Status: ON HOLD | OUTPATIENT
Start: 2018-01-08 | End: 2018-02-17 | Stop reason: HOSPADM

## 2018-01-08 RX ORDER — PROMETHAZINE HYDROCHLORIDE 25 MG/1
TABLET ORAL
Qty: 30 TABLET | Refills: 3 | Status: SHIPPED | OUTPATIENT
Start: 2018-01-08 | End: 2018-04-16 | Stop reason: SDUPTHER

## 2018-01-08 RX ORDER — METRONIDAZOLE 500 MG/1
500 TABLET ORAL 3 TIMES DAILY
Qty: 21 TABLET | Refills: 0 | Status: SHIPPED | OUTPATIENT
Start: 2018-01-08 | End: 2018-01-30 | Stop reason: SDUPTHER

## 2018-01-09 DIAGNOSIS — K50.819 CROHN'S DISEASE OF SMALL AND LARGE INTESTINES WITH COMPLICATION: ICD-10-CM

## 2018-01-09 RX ORDER — ALPRAZOLAM 0.5 MG/1
TABLET ORAL
Qty: 60 TABLET | Refills: 0 | Status: ON HOLD | OUTPATIENT
Start: 2018-01-09 | End: 2018-02-06 | Stop reason: SDUPTHER

## 2018-01-09 RX ORDER — ZOLPIDEM TARTRATE 10 MG/1
10 TABLET ORAL NIGHTLY
Qty: 30 TABLET | Refills: 0 | Status: ON HOLD | OUTPATIENT
Start: 2018-01-09 | End: 2018-02-06 | Stop reason: SDUPTHER

## 2018-01-09 RX ORDER — OXYCODONE AND ACETAMINOPHEN 10; 325 MG/1; MG/1
1 TABLET ORAL EVERY 6 HOURS PRN
Qty: 40 TABLET | Refills: 0 | Status: SHIPPED | OUTPATIENT
Start: 2018-01-09 | End: 2018-02-08 | Stop reason: SDUPTHER

## 2018-01-10 ENCOUNTER — PATIENT MESSAGE (OUTPATIENT)
Dept: GASTROENTEROLOGY | Facility: CLINIC | Age: 38
End: 2018-01-10

## 2018-01-16 ENCOUNTER — TELEPHONE (OUTPATIENT)
Dept: PHARMACY | Facility: CLINIC | Age: 38
End: 2018-01-16

## 2018-01-16 RX ORDER — FLUCONAZOLE 100 MG/1
100 TABLET ORAL DAILY
Qty: 7 TABLET | Refills: 0 | OUTPATIENT
Start: 2018-01-16 | End: 2018-02-15

## 2018-01-22 ENCOUNTER — OFFICE VISIT (OUTPATIENT)
Dept: GASTROENTEROLOGY | Facility: CLINIC | Age: 38
End: 2018-01-22
Payer: COMMERCIAL

## 2018-01-22 VITALS
DIASTOLIC BLOOD PRESSURE: 84 MMHG | HEART RATE: 82 BPM | SYSTOLIC BLOOD PRESSURE: 122 MMHG | HEIGHT: 61 IN | BODY MASS INDEX: 18.28 KG/M2 | WEIGHT: 96.81 LBS

## 2018-01-22 DIAGNOSIS — K50.819 CROHN'S DISEASE OF BOTH SMALL AND LARGE INTESTINE WITH COMPLICATION: Primary | ICD-10-CM

## 2018-01-22 DIAGNOSIS — Z79.899 ENCOUNTER FOR LONG-TERM (CURRENT) USE OF HIGH-RISK MEDICATION: ICD-10-CM

## 2018-01-22 PROCEDURE — 99999 PR PBB SHADOW E&M-EST. PATIENT-LVL III: CPT | Mod: PBBFAC,,, | Performed by: INTERNAL MEDICINE

## 2018-01-22 PROCEDURE — 99215 OFFICE O/P EST HI 40 MIN: CPT | Mod: S$GLB,,, | Performed by: INTERNAL MEDICINE

## 2018-01-22 RX ORDER — PREDNISONE 10 MG/1
10 TABLET ORAL DAILY
Qty: 60 TABLET | Refills: 0 | Status: SHIPPED | OUTPATIENT
Start: 2018-01-22 | End: 2018-02-05

## 2018-01-22 RX ORDER — METRONIDAZOLE 500 MG/1
500 TABLET ORAL
COMMUNITY
End: 2018-01-26

## 2018-01-22 NOTE — PROGRESS NOTES
Subjective:       Patient ID: Ivy Salgado is a 37 y.o. female.    Chief Complaint: Crohn's Disease    HPI  Review of Systems   Constitutional: Negative for activity change, appetite change, chills, diaphoresis, fatigue, fever and unexpected weight change.   HENT: Negative for congestion, ear pain, mouth sores, nosebleeds, postnasal drip, rhinorrhea, sinus pressure, sore throat, trouble swallowing and voice change.    Eyes: Negative for pain.   Respiratory: Negative for cough, shortness of breath and wheezing.    Cardiovascular: Negative for chest pain, palpitations and leg swelling.   Genitourinary: Positive for difficulty urinating, dysuria and flank pain. Negative for hematuria and menstrual problem.   Musculoskeletal: Negative for arthralgias, back pain, gait problem, joint swelling, myalgias and neck pain.   Skin: Positive for rash.   Neurological: Negative for dizziness, tremors, syncope, numbness and headaches.   Hematological: Negative for adenopathy. Does not bruise/bleed easily.   Psychiatric/Behavioral: Negative for agitation, behavioral problems, confusion, decreased concentration and dysphoric mood. The patient is not nervous/anxious.        Objective:      Physical Exam   Abdominal: Soft. Normal appearance and bowel sounds are normal. She exhibits no shifting dullness, no distension, no fluid wave, no abdominal bruit and no mass. There is no hepatosplenomegaly. There is no tenderness.       Assessment:       1. Crohn's disease of both small and large intestine with complication    2. Encounter for long-term (current) use of high-risk medication        Plan:         HISTORY OF PRESENT ILLNESS:  Ivy Rodriguez comes in today for followup.  She is a   lady with longstanding Crohn disease.  She is status post colectomy with   ileostomy.  She has been on Stelara for her most recent treatment.  Ileoscopy   was normal in August, but in November, we did an MR enterography, which showed   definitely concerned  for a developing stricture with some luminal dilation,   although it did not show any definite inflammation.    I reviewed her current symptoms today.  When the main problems has happened   since she started the Stelara as a facial rash.  She gets a facial rash, which   comes on right after the injection and lasted about a week.  It then fades away,   but then curiously it can come back at any time after exposure to any small   amounts of alcohol or maybe with stress as well.  So, in fact, she has it right   now.  There is no rash anywhere else on her body.  She also says her hair is   falling out.  She continues with high output from her ileostomy.  She requires   Imodium and Lomotil every day.  Her output has been increased really for the   past year or so.  It has been very difficult for her to keep hydrated with   appropriate electrolyte balance.  She does feel like prednisone has been the   only thing that helped her with the output from her ileostomy.  She reports   intermittent abdominal pain, sometimes associated with profound distention.  The   abdominal pain is often associated with nausea.  She is taking Zofran for   nausea, but recently Marinol and that seems to help.    SOCIAL HISTORY:  A very restricted diet, a bland diet and lot of liquids.    REVIEW OF SYSTEMS:  She has recently obtained disability.    ASSESSMENT AND DECISION MAKIN.  Crohn disease.  This has been a very complicated case.  Her treatment has   been problematic.  She had pancreatitis from azathioprine and severe leukopenia   from methotrexate.  Remicade she was on for years, but it lost its   effectiveness.  Humira developed a lupus-like side effect.  I had her on Cimzia   for probably five years and that seemed like it was controlling her disease, but   when we had the active disease per MRI a year ago, we made a switch to Stelara.    She really has not responded to so Stelara at all in terms of any symptomatic   improvement.  If  anything that MRI still shows what looks like a possible   stricture and she does have the rash, which I think is due to the Stelara.  I am   going to have a conference After Hours for Dr. Garcia.  I think that probably we   should stop Stelara.  I am going to give her some low-dose prednisone now to see   what that does with symptoms.  I do not think we are at the point where we need   Surgery consultation for severe symptomatic stricture, but I do feel like maybe   we need a different treatment and maybe Entyvio is the right thing to do now.  2.  Long-term use of high-risk medicine.  She has problems from an immune system   with recurrent yeast infections and urinary tract infection.  I think the yeast   is due to this.  I am not sure about the UTIs.  I am worried about the other   side effects from Stelara.  With regard to her use of pain medicine, we   discussed that today.  In general, she is using a pain pill, Percocet once a   day.  She tries to not exceed that.  There has been no escalation in her use.    She is aware of the dangers of opiate use and addiction.  She feels comfortable   in her current pattern of usage as do I.    RECOMMENDATIONS:  1.  Prednisone 10 mg a day.  2.  Conference with Dr. Garcia.    A 45-minute appointment, greater than half the time in face-to-face counseling.      FAUSTO  dd: 01/22/2018 11:42:06 (CST)  td: 01/23/2018 03:13:43 (CST)  Doc ID   #[id number]  Job ID #[id number]    CC:

## 2018-01-23 NOTE — PROGRESS NOTES
Conference with Dr Garcia  Not really sure that there is a problem now of active crohns  Not confirmed by scope or MRI    Will recommend stopping stelara- since it seems to be causing the rash  Then perhaps in 6 months repeat ileoscopy and mri    And for now consider treating SIBO or using bentyl for symptoms

## 2018-01-24 ENCOUNTER — PATIENT MESSAGE (OUTPATIENT)
Dept: INTERNAL MEDICINE | Facility: CLINIC | Age: 38
End: 2018-01-24

## 2018-01-24 ENCOUNTER — HOSPITAL ENCOUNTER (OUTPATIENT)
Dept: RADIOLOGY | Facility: OTHER | Age: 38
Discharge: HOME | End: 2018-01-24
Attending: INTERNAL MEDICINE
Payer: COMMERCIAL

## 2018-01-24 ENCOUNTER — OFFICE VISIT (OUTPATIENT)
Dept: INTERNAL MEDICINE | Facility: CLINIC | Age: 38
End: 2018-01-24
Payer: COMMERCIAL

## 2018-01-24 VITALS
HEART RATE: 79 BPM | SYSTOLIC BLOOD PRESSURE: 108 MMHG | DIASTOLIC BLOOD PRESSURE: 74 MMHG | BODY MASS INDEX: 18.48 KG/M2 | HEIGHT: 61 IN | WEIGHT: 97.88 LBS

## 2018-01-24 DIAGNOSIS — F41.1 GENERALIZED ANXIETY DISORDER: ICD-10-CM

## 2018-01-24 DIAGNOSIS — Z79.52 CURRENT CHRONIC USE OF SYSTEMIC STEROIDS: ICD-10-CM

## 2018-01-24 DIAGNOSIS — Z93.2 S/P ILEOSTOMY: ICD-10-CM

## 2018-01-24 DIAGNOSIS — Z00.00 WELLNESS EXAMINATION: Primary | ICD-10-CM

## 2018-01-24 DIAGNOSIS — F32.0 CURRENT MILD EPISODE OF MAJOR DEPRESSIVE DISORDER WITHOUT PRIOR EPISODE: ICD-10-CM

## 2018-01-24 DIAGNOSIS — I10 ESSENTIAL HYPERTENSION, BENIGN: Chronic | ICD-10-CM

## 2018-01-24 DIAGNOSIS — K50.111 CROHN'S DISEASE OF LARGE INTESTINE WITH RECTAL BLEEDING: ICD-10-CM

## 2018-01-24 DIAGNOSIS — Z13.820 OSTEOPOROSIS SCREENING: ICD-10-CM

## 2018-01-24 DIAGNOSIS — S92.504D CLOSED NONDISPLACED FRACTURE OF PHALANX OF LESSER TOE OF RIGHT FOOT WITH ROUTINE HEALING, UNSPECIFIED PHALANX, SUBSEQUENT ENCOUNTER: ICD-10-CM

## 2018-01-24 DIAGNOSIS — K50.019 CROHN'S DISEASE OF SMALL INTESTINE WITH COMPLICATION: ICD-10-CM

## 2018-01-24 DIAGNOSIS — M85.89 OSTEOPENIA OF MULTIPLE SITES: Primary | ICD-10-CM

## 2018-01-24 PROCEDURE — 77080 DXA BONE DENSITY AXIAL: CPT | Mod: TC

## 2018-01-24 PROCEDURE — 99999 PR PBB SHADOW E&M-EST. PATIENT-LVL IV: CPT | Mod: PBBFAC,,, | Performed by: INTERNAL MEDICINE

## 2018-01-24 PROCEDURE — 99395 PREV VISIT EST AGE 18-39: CPT | Mod: S$GLB,,, | Performed by: INTERNAL MEDICINE

## 2018-01-24 PROCEDURE — 77080 DXA BONE DENSITY AXIAL: CPT | Mod: 26,,, | Performed by: RADIOLOGY

## 2018-01-24 RX ORDER — CITALOPRAM 20 MG/1
20 TABLET, FILM COATED ORAL DAILY
Qty: 90 TABLET | Refills: 3 | Status: ON HOLD | OUTPATIENT
Start: 2018-01-24 | End: 2018-02-05 | Stop reason: HOSPADM

## 2018-01-24 NOTE — PROGRESS NOTES
Subjective:       Patient ID: Ivy Salgado is a 37 y.o. female.    Chief Complaint: Foot Pain    Pt here for annual. Crohn's is not well controlled. She has minimal abd pain but has had nausea and diarrhea. No fever. She was on stelara but in consultation with GI was decided she should stop this. She is now on prednisone 10mg daily. She sees Dr. Ross with GI regularly.      Anxiety/depression are not well controlled. She would like to restart celexa. She is needing xanax only 3-4x/wk. No SI/HI. She is also using ambien 2-3x/week for sleep.     Pt's BP is well controlled. Tolerating meds well. Pt denies cp/sob/ha/vision or neuro changes. Checking at home and is well controlled.     Pt hit foot on table a few days ago and was having pain in L small toe. Saw UC and told she had fracture and is now in boot. Feeling better but would like referral to podiatry as this is 2nd time she has fracture foot. We discussed DEXA considering her crohns and chronic steroid use.         Anxiety   Patient reports no chest pain, dizziness, palpitations or shortness of breath.         Review of Systems   Constitutional: Negative for fatigue, fever and unexpected weight change.   HENT: Negative for congestion and sore throat.    Eyes: Negative for visual disturbance.   Respiratory: Negative for shortness of breath.    Cardiovascular: Negative for chest pain, palpitations and leg swelling.   Gastrointestinal: Negative for abdominal pain, blood in stool, constipation and diarrhea.   Genitourinary: Negative for dysuria.   Musculoskeletal: Negative for arthralgias.   Skin: Negative for rash.   Neurological: Negative for dizziness, syncope and headaches.   Hematological: Negative for adenopathy. Does not bruise/bleed easily.   Psychiatric/Behavioral: Negative for dysphoric mood.       Objective:      Physical Exam   Constitutional: She is oriented to person, place, and time. She appears well-developed and well-nourished.   HENT:   Head:  Normocephalic and atraumatic.   Right Ear: Tympanic membrane, external ear and ear canal normal.   Left Ear: Tympanic membrane, external ear and ear canal normal.   Nose: Nose normal.   Mouth/Throat: Uvula is midline and oropharynx is clear and moist.   Eyes: Conjunctivae, EOM and lids are normal. Pupils are equal, round, and reactive to light. Right conjunctiva is not injected. Left conjunctiva is not injected.   Neck: Neck supple. No JVD present. Carotid bruit is not present. No thyroid mass and no thyromegaly present.   Cardiovascular: Normal rate, regular rhythm, S1 normal, S2 normal, normal heart sounds and intact distal pulses.  PMI is not displaced.    No murmur heard.  Pulses:       Posterior tibial pulses are 2+ on the right side, and 2+ on the left side.   Pulmonary/Chest: Effort normal and breath sounds normal. She has no wheezes. She has no rhonchi. She has no rales.   Abdominal: Soft. Bowel sounds are normal. She exhibits no distension, no abdominal bruit and no mass. There is no hepatosplenomegaly. There is no tenderness.   Stoma without surrounding erythema/induration.    Musculoskeletal: She exhibits no edema.   L foot in boot   Lymphadenopathy:        Head (right side): No preauricular and no posterior auricular adenopathy present.        Head (left side): No preauricular and no posterior auricular adenopathy present.     She has no cervical adenopathy.     She has no axillary adenopathy.   Neurological: She is alert and oriented to person, place, and time. She has normal strength. No cranial nerve deficit or sensory deficit.   Skin: Skin is warm. No rash noted.   Psychiatric: She has a normal mood and affect. Her speech is normal and behavior is normal. Judgment and thought content normal.       Assessment:       1. Wellness examination    2. Generalized anxiety disorder    3. Current mild episode of major depressive disorder without prior episode    4. Essential hypertension, benign    5. S/P  ileostomy    6. Crohn's disease of small intestine with complication    7. Crohn's disease of large intestine with rectal bleeding    8. Closed nondisplaced fracture of phalanx of lesser toe of LEFT foot with routine healing, unspecified phalanx, subsequent encounter    9. Osteoporosis screening    10. Current chronic use of systemic steroids        Plan:       1. Restart celexa  2. Home BP monitoring  3. Prior labs reviewed with pt  4. Pt will let me know how she is doing via [facility]ie in 6 weeks  5. Podiatry referral  6. DEXA

## 2018-01-26 ENCOUNTER — HOSPITAL ENCOUNTER (EMERGENCY)
Facility: OTHER | Age: 38
Discharge: HOME | End: 2018-01-27
Attending: EMERGENCY MEDICINE
Payer: COMMERCIAL

## 2018-01-26 DIAGNOSIS — R00.2 PALPITATION: Primary | ICD-10-CM

## 2018-01-26 DIAGNOSIS — R07.9 CHEST PAIN: ICD-10-CM

## 2018-01-26 PROCEDURE — 99285 EMERGENCY DEPT VISIT HI MDM: CPT | Mod: 25

## 2018-01-26 PROCEDURE — 93010 ELECTROCARDIOGRAM REPORT: CPT | Mod: ,,, | Performed by: INTERNAL MEDICINE

## 2018-01-26 RX ORDER — ONDANSETRON 2 MG/ML
4 INJECTION INTRAMUSCULAR; INTRAVENOUS
Status: COMPLETED | OUTPATIENT
Start: 2018-01-26 | End: 2018-01-27

## 2018-01-26 RX ORDER — SODIUM CHLORIDE 9 MG/ML
1000 INJECTION, SOLUTION INTRAVENOUS
Status: COMPLETED | OUTPATIENT
Start: 2018-01-26 | End: 2018-01-27

## 2018-01-26 RX ORDER — ALPRAZOLAM 0.5 MG/1
1 TABLET ORAL
Status: COMPLETED | OUTPATIENT
Start: 2018-01-26 | End: 2018-01-27

## 2018-01-27 VITALS
BODY MASS INDEX: 18.88 KG/M2 | SYSTOLIC BLOOD PRESSURE: 124 MMHG | HEIGHT: 61 IN | DIASTOLIC BLOOD PRESSURE: 76 MMHG | RESPIRATION RATE: 16 BRPM | HEART RATE: 82 BPM | WEIGHT: 100 LBS | TEMPERATURE: 99 F | OXYGEN SATURATION: 97 %

## 2018-01-27 LAB
ANION GAP SERPL CALC-SCNC: 14 MMOL/L
BASOPHILS # BLD AUTO: 0.01 K/UL
BASOPHILS NFR BLD: 0.1 %
BUN SERPL-MCNC: 11 MG/DL
CALCIUM SERPL-MCNC: 9.1 MG/DL
CHLORIDE SERPL-SCNC: 105 MMOL/L
CO2 SERPL-SCNC: 20 MMOL/L
CREAT SERPL-MCNC: 0.7 MG/DL
D DIMER PPP IA.FEU-MCNC: 0.61 MG/L FEU
DIFFERENTIAL METHOD: ABNORMAL
EOSINOPHIL # BLD AUTO: 0 K/UL
EOSINOPHIL NFR BLD: 0.1 %
ERYTHROCYTE [DISTWIDTH] IN BLOOD BY AUTOMATED COUNT: 14 %
EST. GFR  (AFRICAN AMERICAN): >60 ML/MIN/1.73 M^2
EST. GFR  (NON AFRICAN AMERICAN): >60 ML/MIN/1.73 M^2
GLUCOSE SERPL-MCNC: 84 MG/DL
HCT VFR BLD AUTO: 37.7 %
HGB BLD-MCNC: 12.6 G/DL
LYMPHOCYTES # BLD AUTO: 1.9 K/UL
LYMPHOCYTES NFR BLD: 17.1 %
MCH RBC QN AUTO: 30.7 PG
MCHC RBC AUTO-ENTMCNC: 33.4 G/DL
MCV RBC AUTO: 92 FL
MONOCYTES # BLD AUTO: 1.2 K/UL
MONOCYTES NFR BLD: 10.2 %
NEUTROPHILS # BLD AUTO: 8.1 K/UL
NEUTROPHILS NFR BLD: 72.3 %
PLATELET # BLD AUTO: 310 K/UL
PMV BLD AUTO: 9.4 FL
POTASSIUM SERPL-SCNC: 3.4 MMOL/L
RBC # BLD AUTO: 4.1 M/UL
SODIUM SERPL-SCNC: 139 MMOL/L
TROPONIN I SERPL DL<=0.01 NG/ML-MCNC: <0.006 NG/ML
TSH SERPL DL<=0.005 MIU/L-ACNC: 2.05 UIU/ML
WBC # BLD AUTO: 11.24 K/UL

## 2018-01-27 PROCEDURE — 96374 THER/PROPH/DIAG INJ IV PUSH: CPT

## 2018-01-27 PROCEDURE — 25000003 PHARM REV CODE 250: Performed by: PHYSICIAN ASSISTANT

## 2018-01-27 PROCEDURE — 63600175 PHARM REV CODE 636 W HCPCS: Performed by: EMERGENCY MEDICINE

## 2018-01-27 PROCEDURE — 25000003 PHARM REV CODE 250: Performed by: EMERGENCY MEDICINE

## 2018-01-27 PROCEDURE — 25500020 PHARM REV CODE 255: Performed by: EMERGENCY MEDICINE

## 2018-01-27 PROCEDURE — 63600175 PHARM REV CODE 636 W HCPCS: Performed by: PHYSICIAN ASSISTANT

## 2018-01-27 PROCEDURE — 84443 ASSAY THYROID STIM HORMONE: CPT

## 2018-01-27 PROCEDURE — 80048 BASIC METABOLIC PNL TOTAL CA: CPT

## 2018-01-27 PROCEDURE — 85379 FIBRIN DEGRADATION QUANT: CPT

## 2018-01-27 PROCEDURE — 96361 HYDRATE IV INFUSION ADD-ON: CPT

## 2018-01-27 PROCEDURE — 85025 COMPLETE CBC W/AUTO DIFF WBC: CPT

## 2018-01-27 PROCEDURE — 84484 ASSAY OF TROPONIN QUANT: CPT

## 2018-01-27 RX ORDER — HEPARIN SODIUM (PORCINE) LOCK FLUSH IV SOLN 100 UNIT/ML 100 UNIT/ML
5 SOLUTION INTRAVENOUS
Status: COMPLETED | OUTPATIENT
Start: 2018-01-27 | End: 2018-01-27

## 2018-01-27 RX ORDER — BUTALBITAL, ACETAMINOPHEN AND CAFFEINE 50; 325; 40 MG/1; MG/1; MG/1
1 TABLET ORAL
Status: COMPLETED | OUTPATIENT
Start: 2018-01-27 | End: 2018-01-27

## 2018-01-27 RX ADMIN — ALPRAZOLAM 1 MG: 0.5 TABLET ORAL at 12:01

## 2018-01-27 RX ADMIN — ONDANSETRON 4 MG: 2 INJECTION, SOLUTION INTRAMUSCULAR; INTRAVENOUS at 12:01

## 2018-01-27 RX ADMIN — SODIUM CHLORIDE 1000 ML: 0.9 INJECTION, SOLUTION INTRAVENOUS at 12:01

## 2018-01-27 RX ADMIN — IOHEXOL 75 ML: 350 INJECTION, SOLUTION INTRAVENOUS at 02:01

## 2018-01-27 RX ADMIN — HEPARIN SODIUM (PORCINE) LOCK FLUSH IV SOLN 100 UNIT/ML 500 UNITS: 100 SOLUTION at 03:01

## 2018-01-27 RX ADMIN — BUTALBITAL, ACETAMINOPHEN AND CAFFEINE 1 TABLET: 50; 325; 40 TABLET ORAL at 02:01

## 2018-01-27 NOTE — ED PROVIDER NOTES
Encounter Date: 1/26/2018       History     Chief Complaint   Patient presents with    Palpitations     pt c/o palpitations, dizziness, and chest tightness x 15 minutes ago, states she was not feeling anxious about anything befor symptoms started     Patient is 37-year-old female significant past medical history most consistent with Crohn's on daily prednisone with ileostomy, anxiety controlled with Xanax who presents with complaints of crushing chest pressure and burning sensation with palpitations and nausea.  She reports symptoms started suddenly approximately 2 hours ago and have been constant.  She reports having terrible shortness of breath with inability to speak clearly on the ride over to the emergency department.  She reports taking her Xanax today with no improvement in symptoms.  Denies fever, chills, URI symptoms.  Does endorse breaking toe and wearing a boot which has limited her mobility at home.  Denies hemoptysis or history of blood clots.  Denies recent surgery.  No report of trauma or injury.  Currently accompanied by male significant other who is at bedside.          Review of patient's allergies indicates:   Allergen Reactions    Azathioprine sodium      Other reaction(s): pancreatitis  Other reaction(s): pancreatitis    Methotrexate      Other reaction(s): infection-    Morphine Itching and Other (See Comments)     Other reaction(s): Itching     Past Medical History:   Diagnosis Date    Abnormal Pap smear 2007    Abnormal Pap smear 5/26/2011    Anemia     Anxiety     Arthritis     C. difficile diarrhea     Crohn's disease     Depression 8/5/2017    Encounter for blood transfusion     Genital HSV     History of colposcopy with cervical biopsy 2007 and 7/2011 2007-LYLA I  and 7/2011- LYLA I    Hypertension     Kidney stone     Kidney stone     Recurrent UTI 4/3/2013    S/P ileostomy 7/9/2012    Sterilization 6/23/2012     Past Surgical History:   Procedure Laterality Date     ABDOMINAL SURGERY      APPENDECTOMY      BLADDER SURGERY      partial cystectomy due to fistula    CKC      COLON SURGERY      COLONOSCOPY      HYSTERECTOMY  04/16/2015    [facility]IE    ILEOSTOMY      OOPHORECTOMY Right 04/16/2015    PORTACATH PLACEMENT      SKIN BIOPSY      SMALL INTESTINE SURGERY      TOTAL COLECTOMY      TUBAL LIGATION  06 06 2012    UPPER GASTROINTESTINAL ENDOSCOPY       Family History   Problem Relation Age of Onset    Colon cancer Father     Cancer Father      colon cancer    Hypertension Mother     Cancer Maternal Grandfather      esopghal     Skin cancer Maternal Grandfather     Endometrial cancer Maternal Aunt     Crohn's disease Brother     Breast cancer Neg Hx     Ovarian cancer Neg Hx      Social History   Substance Use Topics    Smoking status: Never Smoker    Smokeless tobacco: Never Used    Alcohol use 0.0 oz/week      Comment: Patient only drinks wine not regular basis.     Review of Systems   Constitutional: Negative for fever.   HENT: Negative for sore throat.    Respiratory: Negative for shortness of breath.    Cardiovascular: Positive for palpitations. Negative for chest pain.        Chest pressure   Gastrointestinal: Negative for nausea.   Genitourinary: Negative for dysuria.   Musculoskeletal: Negative for back pain.   Skin: Negative for rash.   Neurological: Negative for weakness.   Hematological: Does not bruise/bleed easily.       Physical Exam     Initial Vitals [01/26/18 2134]   BP Pulse Resp Temp SpO2   137/89 (!) 115 16 98.7 °F (37.1 °C) 99 %      MAP       105         Physical Exam    Nursing note and vitals reviewed.  Constitutional: She appears well-developed and well-nourished. She is not diaphoretic. No distress.   Healthy appearing female in no acute distress but does appear very anxious during interview and exam.  She makes good eye contact, speaks in clear full sentences and does not ambulate during my exam.   HENT:   Head: Normocephalic  and atraumatic.   Eyes: Conjunctivae and EOM are normal. Pupils are equal, round, and reactive to light. Right eye exhibits no discharge. Left eye exhibits no discharge. No scleral icterus.   Neck: Normal range of motion.   Cardiovascular: Regular rhythm, normal heart sounds and intact distal pulses. Exam reveals no gallop and no friction rub.    No murmur heard.  Tachycardic   Pulmonary/Chest: Breath sounds normal. She has no wheezes. She has no rhonchi. She has no rales.   Abdominal: Soft. Bowel sounds are normal. There is no tenderness. There is no rebound and no guarding.   Musculoskeletal: Normal range of motion. She exhibits no edema or tenderness.   Left lower extremity is in a boot because of a broken toe   Lymphadenopathy:     She has no cervical adenopathy.   Neurological: She is alert and oriented to person, place, and time. She has normal strength.   Skin: Skin is warm. Capillary refill takes less than 2 seconds. No rash and no abscess noted. No erythema.   Psychiatric: She has a normal mood and affect. Her behavior is normal. Thought content normal.         ED Course   Procedures  Labs Reviewed   CBC W/ AUTO DIFFERENTIAL   BASIC METABOLIC PANEL   TROPONIN I   TSH     EKG Readings: (Independently Interpreted)   Initial Reading: No STEMI. Rhythm: Sinus Tachycardia. Heart Rate: 138. Ectopy: No Ectopy. Axis: Normal.          Medical Decision Making:   ED Management:  Urgent evaluation of 37-year-old female who presents with complaints of tachycardia, palpitations, chest tightness and pressure with shortness of breath concerning for possible pulmonary embolus versus cardiac etiology versus anxiety.  She is afebrile, nontoxic appearing, hemodynamically stable the tachycardic to 115.  Physical exam reveals reveals tachycardia with clear lungs to auscultation.  Dry mucous membranes.  Diagnostic labs and imaging pending.  We'll give fluids, antiemetic and anxiolytic.  We'll continue to monitor.    Update: CXR  unremarkable. D-dimer is elevated to 0.61. Will obtain PE study. Patient maintains stable VS and has normalized HR to 80 bpm  2:01 AM will turn over care to ED MD for dispo planning.    Other:   I have discussed this case with another health care provider.       <> Summary of the Discussion: Padmini                   ED Course      Clinical Impression:   The primary encounter diagnosis was Palpitation. A diagnosis of Chest pain was also pertinent to this visit.                           Patti Pelletier PA-C  01/28/18 [id number]

## 2018-01-27 NOTE — ED TRIAGE NOTES
"Pt presents with c/o chest pain, onset 2 hours ago. Pt was sitting at rest when pain started. + SOB, palpitations, dizziness. + nausea, denies emesis. + weakness. Pt also reporting and episode of confusion. Pt reports burning pain that "comes in waves every couple of minutes". Pt denies any blood in stool. Pt denies burning with urination. Pt denies history of similar symptoms. Mild shaking to body noted. + ileostomy, hx of crohns.  "

## 2018-01-27 NOTE — ED NOTES
NEURO: Pt AAO x 4. Behavior and speech appropriate to situation.   CARDIAC: Regular rhythm auscultated  RESPIRATORY: Respirations even and unlabored. Breath sounds clear to all lung fields.   MUSCULOSKELETAL: Active ROM noted to extremities. Pt wearing walking boot to left foot.

## 2018-01-27 NOTE — ED NOTES
Pt sitting up in bed, respirations even/unlabored. NAD noted. Pt denies any current needs at this time. Side rails up x2, call bell within reach. Will continue to monitor.

## 2018-01-27 NOTE — ED NOTES
BSSR received from amna Hill. Pt sitting up in bed, respirations even/unlabored. NAD noted. Pt denies any current needs at this time. Side rails up x2, call bell within reach. Will continue to monitor.

## 2018-01-29 ENCOUNTER — PATIENT MESSAGE (OUTPATIENT)
Dept: INTERNAL MEDICINE | Facility: CLINIC | Age: 38
End: 2018-01-29

## 2018-01-31 RX ORDER — METRONIDAZOLE 500 MG/1
500 TABLET ORAL 3 TIMES DAILY
Qty: 21 TABLET | Refills: 0 | Status: ON HOLD | OUTPATIENT
Start: 2018-01-31 | End: 2018-02-05 | Stop reason: HOSPADM

## 2018-02-02 ENCOUNTER — HOSPITAL ENCOUNTER (INPATIENT)
Facility: HOSPITAL | Age: 38
LOS: 2 days | Discharge: HOME | DRG: 872 | End: 2018-02-05
Attending: EMERGENCY MEDICINE | Admitting: EMERGENCY MEDICINE
Payer: COMMERCIAL

## 2018-02-02 DIAGNOSIS — K50.819 CROHN'S DISEASE OF BOTH SMALL AND LARGE INTESTINE WITH COMPLICATION: ICD-10-CM

## 2018-02-02 DIAGNOSIS — A41.9 SEPSIS, DUE TO UNSPECIFIED ORGANISM: Primary | ICD-10-CM

## 2018-02-02 DIAGNOSIS — R00.2 PALPITATIONS: ICD-10-CM

## 2018-02-02 DIAGNOSIS — R07.9 CHEST PAIN: ICD-10-CM

## 2018-02-02 DIAGNOSIS — R00.0 TACHYCARDIA: ICD-10-CM

## 2018-02-02 DIAGNOSIS — N39.0 SEPSIS SECONDARY TO UTI: ICD-10-CM

## 2018-02-02 DIAGNOSIS — Z93.2 ILEOSTOMY PRESENT: ICD-10-CM

## 2018-02-02 DIAGNOSIS — E87.20 LACTIC ACIDOSIS: ICD-10-CM

## 2018-02-02 DIAGNOSIS — N39.0 URINARY TRACT INFECTION WITHOUT HEMATURIA, SITE UNSPECIFIED: ICD-10-CM

## 2018-02-02 DIAGNOSIS — A41.9 SEPSIS SECONDARY TO UTI: ICD-10-CM

## 2018-02-02 LAB
ALBUMIN SERPL BCP-MCNC: 3.8 G/DL
ALLENS TEST: ABNORMAL
ALP SERPL-CCNC: 62 U/L
ALT SERPL W/O P-5'-P-CCNC: 18 U/L
ANION GAP SERPL CALC-SCNC: 13 MMOL/L
AST SERPL-CCNC: 33 U/L
BACTERIA #/AREA URNS AUTO: ABNORMAL /HPF
BASOPHILS # BLD AUTO: 0.03 K/UL
BASOPHILS NFR BLD: 0.3 %
BILIRUB SERPL-MCNC: 0.3 MG/DL
BILIRUB UR QL STRIP: NEGATIVE
BUN SERPL-MCNC: 7 MG/DL
CALCIUM SERPL-MCNC: 9.4 MG/DL
CHLORIDE SERPL-SCNC: 101 MMOL/L
CLARITY UR REFRACT.AUTO: ABNORMAL
CO2 SERPL-SCNC: 23 MMOL/L
COLOR UR AUTO: YELLOW
CREAT SERPL-MCNC: 0.8 MG/DL
DELSYS: ABNORMAL
DIFFERENTIAL METHOD: ABNORMAL
EOSINOPHIL # BLD AUTO: 0.1 K/UL
EOSINOPHIL NFR BLD: 0.7 %
ERYTHROCYTE [DISTWIDTH] IN BLOOD BY AUTOMATED COUNT: 13.6 %
EST. GFR  (AFRICAN AMERICAN): >60 ML/MIN/1.73 M^2
EST. GFR  (NON AFRICAN AMERICAN): >60 ML/MIN/1.73 M^2
GLUCOSE SERPL-MCNC: 135 MG/DL
GLUCOSE UR QL STRIP: NEGATIVE
HCO3 UR-SCNC: 22.5 MMOL/L (ref 24–28)
HCT VFR BLD AUTO: 40.2 %
HGB BLD-MCNC: 13.7 G/DL
HGB UR QL STRIP: NEGATIVE
IMM GRANULOCYTES # BLD AUTO: 0.03 K/UL
IMM GRANULOCYTES NFR BLD AUTO: 0.3 %
INR PPP: 1.1
KETONES UR QL STRIP: NEGATIVE
LACTATE SERPL-SCNC: 4.9 MMOL/L
LDH SERPL L TO P-CCNC: 0.54 MMOL/L (ref 0.5–2.2)
LEUKOCYTE ESTERASE UR QL STRIP: ABNORMAL
LIPASE SERPL-CCNC: 27 U/L
LYMPHOCYTES # BLD AUTO: 3.5 K/UL
LYMPHOCYTES NFR BLD: 36.5 %
MAGNESIUM SERPL-MCNC: 2.2 MG/DL
MCH RBC QN AUTO: 30.9 PG
MCHC RBC AUTO-ENTMCNC: 34.1 G/DL
MCV RBC AUTO: 91 FL
MICROSCOPIC COMMENT: ABNORMAL
MONOCYTES # BLD AUTO: 1 K/UL
MONOCYTES NFR BLD: 10.7 %
NEUTROPHILS # BLD AUTO: 4.9 K/UL
NEUTROPHILS NFR BLD: 51.5 %
NITRITE UR QL STRIP: POSITIVE
NRBC BLD-RTO: 0 /100 WBC
PCO2 BLDA: 40.1 MMHG (ref 35–45)
PH SMN: 7.36 [PH] (ref 7.35–7.45)
PH UR STRIP: 5 [PH] (ref 5–8)
PLATELET # BLD AUTO: 355 K/UL
PMV BLD AUTO: 9.7 FL
PO2 BLDA: 50 MMHG (ref 40–60)
POC BE: -3 MMOL/L
POC SATURATED O2: 83 % (ref 95–100)
POC TCO2: 24 MMOL/L (ref 24–29)
POTASSIUM SERPL-SCNC: 3.5 MMOL/L
PROT SERPL-MCNC: 8.6 G/DL
PROT UR QL STRIP: NEGATIVE
PROTHROMBIN TIME: 11.4 SEC
RBC # BLD AUTO: 4.43 M/UL
RBC #/AREA URNS AUTO: 2 /HPF (ref 0–4)
SAMPLE: ABNORMAL
SITE: ABNORMAL
SODIUM SERPL-SCNC: 137 MMOL/L
SP GR UR STRIP: 1.02 (ref 1–1.03)
SQUAMOUS #/AREA URNS AUTO: 1 /HPF
TROPONIN I SERPL DL<=0.01 NG/ML-MCNC: <0.006 NG/ML
URN SPEC COLLECT METH UR: ABNORMAL
UROBILINOGEN UR STRIP-ACNC: NEGATIVE EU/DL
WBC # BLD AUTO: 9.56 K/UL
WBC #/AREA URNS AUTO: >100 /HPF (ref 0–5)

## 2018-02-02 PROCEDURE — 25000003 PHARM REV CODE 250: Performed by: EMERGENCY MEDICINE

## 2018-02-02 PROCEDURE — 93005 ELECTROCARDIOGRAM TRACING: CPT

## 2018-02-02 PROCEDURE — 82803 BLOOD GASES ANY COMBINATION: CPT

## 2018-02-02 PROCEDURE — 63600175 PHARM REV CODE 636 W HCPCS: Performed by: EMERGENCY MEDICINE

## 2018-02-02 PROCEDURE — 96361 HYDRATE IV INFUSION ADD-ON: CPT

## 2018-02-02 PROCEDURE — 25000003 PHARM REV CODE 250: Performed by: PHYSICIAN ASSISTANT

## 2018-02-02 PROCEDURE — 87077 CULTURE AEROBIC IDENTIFY: CPT

## 2018-02-02 PROCEDURE — 96366 THER/PROPH/DIAG IV INF ADDON: CPT

## 2018-02-02 PROCEDURE — 85025 COMPLETE CBC W/AUTO DIFF WBC: CPT

## 2018-02-02 PROCEDURE — 87088 URINE BACTERIA CULTURE: CPT

## 2018-02-02 PROCEDURE — 83690 ASSAY OF LIPASE: CPT

## 2018-02-02 PROCEDURE — 85610 PROTHROMBIN TIME: CPT

## 2018-02-02 PROCEDURE — 87186 SC STD MICRODIL/AGAR DIL: CPT

## 2018-02-02 PROCEDURE — 96365 THER/PROPH/DIAG IV INF INIT: CPT

## 2018-02-02 PROCEDURE — 84484 ASSAY OF TROPONIN QUANT: CPT

## 2018-02-02 PROCEDURE — 83735 ASSAY OF MAGNESIUM: CPT

## 2018-02-02 PROCEDURE — 99291 CRITICAL CARE FIRST HOUR: CPT | Mod: ,,, | Performed by: EMERGENCY MEDICINE

## 2018-02-02 PROCEDURE — 83605 ASSAY OF LACTIC ACID: CPT

## 2018-02-02 PROCEDURE — S0028 INJECTION, FAMOTIDINE, 20 MG: HCPCS | Performed by: PHYSICIAN ASSISTANT

## 2018-02-02 PROCEDURE — 99900035 HC TECH TIME PER 15 MIN (STAT)

## 2018-02-02 PROCEDURE — 87040 BLOOD CULTURE FOR BACTERIA: CPT

## 2018-02-02 PROCEDURE — 93010 ELECTROCARDIOGRAM REPORT: CPT | Mod: ,,, | Performed by: INTERNAL MEDICINE

## 2018-02-02 PROCEDURE — 94761 N-INVAS EAR/PLS OXIMETRY MLT: CPT

## 2018-02-02 PROCEDURE — 63600175 PHARM REV CODE 636 W HCPCS: Performed by: PHYSICIAN ASSISTANT

## 2018-02-02 PROCEDURE — 99285 EMERGENCY DEPT VISIT HI MDM: CPT | Mod: 25

## 2018-02-02 PROCEDURE — 80053 COMPREHEN METABOLIC PANEL: CPT

## 2018-02-02 PROCEDURE — 87086 URINE CULTURE/COLONY COUNT: CPT

## 2018-02-02 PROCEDURE — 96375 TX/PRO/DX INJ NEW DRUG ADDON: CPT

## 2018-02-02 PROCEDURE — 81001 URINALYSIS AUTO W/SCOPE: CPT

## 2018-02-02 RX ORDER — ONDANSETRON 2 MG/ML
4 INJECTION INTRAMUSCULAR; INTRAVENOUS
Status: COMPLETED | OUTPATIENT
Start: 2018-02-02 | End: 2018-02-02

## 2018-02-02 RX ORDER — FENTANYL CITRATE 50 UG/ML
50 INJECTION, SOLUTION INTRAMUSCULAR; INTRAVENOUS
Status: COMPLETED | OUTPATIENT
Start: 2018-02-02 | End: 2018-02-02

## 2018-02-02 RX ORDER — LORAZEPAM 2 MG/ML
0.5 INJECTION INTRAMUSCULAR
Status: COMPLETED | OUTPATIENT
Start: 2018-02-02 | End: 2018-02-02

## 2018-02-02 RX ORDER — FAMOTIDINE 10 MG/ML
20 INJECTION INTRAVENOUS
Status: COMPLETED | OUTPATIENT
Start: 2018-02-02 | End: 2018-02-02

## 2018-02-02 RX ORDER — CEFEPIME HYDROCHLORIDE 2 G/1
2 INJECTION, POWDER, FOR SOLUTION INTRAVENOUS
Status: COMPLETED | OUTPATIENT
Start: 2018-02-02 | End: 2018-02-02

## 2018-02-02 RX ADMIN — FENTANYL CITRATE 50 MCG: 50 INJECTION, SOLUTION INTRAMUSCULAR; INTRAVENOUS at 09:02

## 2018-02-02 RX ADMIN — LORAZEPAM 0.5 MG: 2 INJECTION, SOLUTION INTRAMUSCULAR; INTRAVENOUS at 09:02

## 2018-02-02 RX ADMIN — VANCOMYCIN HYDROCHLORIDE 2000 MG: 1 INJECTION, POWDER, LYOPHILIZED, FOR SOLUTION INTRAVENOUS at 10:02

## 2018-02-02 RX ADMIN — ONDANSETRON 4 MG: 2 INJECTION INTRAMUSCULAR; INTRAVENOUS at 09:02

## 2018-02-02 RX ADMIN — ALUMINUM HYDROXIDE, MAGNESIUM HYDROXIDE, AND SIMETHICONE 50 ML: 200; 200; 20 SUSPENSION ORAL at 09:02

## 2018-02-02 RX ADMIN — FAMOTIDINE 20 MG: 10 INJECTION, SOLUTION INTRAVENOUS at 09:02

## 2018-02-02 RX ADMIN — SODIUM CHLORIDE 1000 ML: 0.9 INJECTION, SOLUTION INTRAVENOUS at 09:02

## 2018-02-02 RX ADMIN — SODIUM CHLORIDE 1000 ML: 0.9 INJECTION, SOLUTION INTRAVENOUS at 10:02

## 2018-02-02 RX ADMIN — CEFEPIME 2 G: 2 INJECTION, POWDER, FOR SOLUTION INTRAVENOUS at 10:02

## 2018-02-03 PROBLEM — N30.00 ACUTE CYSTITIS WITHOUT HEMATURIA: Status: ACTIVE | Noted: 2018-02-03

## 2018-02-03 PROBLEM — Z79.52 CURRENT CHRONIC USE OF SYSTEMIC STEROIDS: Chronic | Status: ACTIVE | Noted: 2018-02-03

## 2018-02-03 PROBLEM — N39.0 URINARY TRACT INFECTION WITHOUT HEMATURIA: Status: ACTIVE | Noted: 2018-02-03

## 2018-02-03 PROBLEM — R07.89 OTHER CHEST PAIN: Status: ACTIVE | Noted: 2018-02-03

## 2018-02-03 PROBLEM — I47.19 ATRIAL TACHYCARDIA: Status: ACTIVE | Noted: 2018-02-03

## 2018-02-03 PROBLEM — A41.9 SEPSIS SECONDARY TO UTI: Status: ACTIVE | Noted: 2018-02-03

## 2018-02-03 PROBLEM — N39.0 SEPSIS SECONDARY TO UTI: Status: ACTIVE | Noted: 2018-02-03

## 2018-02-03 LAB
ALBUMIN SERPL BCP-MCNC: 2.8 G/DL
ALP SERPL-CCNC: 48 U/L
ALT SERPL W/O P-5'-P-CCNC: 13 U/L
ANION GAP SERPL CALC-SCNC: 6 MMOL/L
AST SERPL-CCNC: 16 U/L
BASOPHILS # BLD AUTO: 0.01 K/UL
BASOPHILS NFR BLD: 0.1 %
BILIRUB SERPL-MCNC: 0.4 MG/DL
BUN SERPL-MCNC: 6 MG/DL
CALCIUM SERPL-MCNC: 7.6 MG/DL
CHLORIDE SERPL-SCNC: 111 MMOL/L
CO2 SERPL-SCNC: 23 MMOL/L
CORTIS SERPL-MCNC: <1 UG/DL
CREAT SERPL-MCNC: 0.7 MG/DL
DIFFERENTIAL METHOD: ABNORMAL
EOSINOPHIL # BLD AUTO: 0.1 K/UL
EOSINOPHIL NFR BLD: 0.6 %
ERYTHROCYTE [DISTWIDTH] IN BLOOD BY AUTOMATED COUNT: 13.7 %
EST. GFR  (AFRICAN AMERICAN): >60 ML/MIN/1.73 M^2
EST. GFR  (NON AFRICAN AMERICAN): >60 ML/MIN/1.73 M^2
ESTIMATED AVG GLUCOSE: 85 MG/DL
GLUCOSE SERPL-MCNC: 85 MG/DL
HBA1C MFR BLD HPLC: 4.6 %
HCT VFR BLD AUTO: 33.1 %
HGB BLD-MCNC: 10.9 G/DL
IMM GRANULOCYTES # BLD AUTO: 0.03 K/UL
IMM GRANULOCYTES NFR BLD AUTO: 0.3 %
LACTATE SERPL-SCNC: 0.7 MMOL/L
LYMPHOCYTES # BLD AUTO: 2 K/UL
LYMPHOCYTES NFR BLD: 17.3 %
MAGNESIUM SERPL-MCNC: 1.4 MG/DL
MCH RBC QN AUTO: 30.4 PG
MCHC RBC AUTO-ENTMCNC: 32.9 G/DL
MCV RBC AUTO: 93 FL
MONOCYTES # BLD AUTO: 1.1 K/UL
MONOCYTES NFR BLD: 9 %
NEUTROPHILS # BLD AUTO: 8.5 K/UL
NEUTROPHILS NFR BLD: 72.7 %
NRBC BLD-RTO: 0 /100 WBC
PHOSPHATE SERPL-MCNC: 3.3 MG/DL
PLATELET # BLD AUTO: 277 K/UL
PMV BLD AUTO: 10 FL
POTASSIUM SERPL-SCNC: 3.5 MMOL/L
PROCALCITONIN SERPL IA-MCNC: 0.03 NG/ML
PROT SERPL-MCNC: 5.7 G/DL
RBC # BLD AUTO: 3.58 M/UL
SODIUM SERPL-SCNC: 140 MMOL/L
TROPONIN I SERPL DL<=0.01 NG/ML-MCNC: <0.006 NG/ML
WBC # BLD AUTO: 11.73 K/UL

## 2018-02-03 PROCEDURE — 63600175 PHARM REV CODE 636 W HCPCS: Performed by: PHYSICIAN ASSISTANT

## 2018-02-03 PROCEDURE — 25000003 PHARM REV CODE 250: Performed by: STUDENT IN AN ORGANIZED HEALTH CARE EDUCATION/TRAINING PROGRAM

## 2018-02-03 PROCEDURE — 93010 ELECTROCARDIOGRAM REPORT: CPT | Mod: ,,, | Performed by: INTERNAL MEDICINE

## 2018-02-03 PROCEDURE — 83735 ASSAY OF MAGNESIUM: CPT

## 2018-02-03 PROCEDURE — 85025 COMPLETE CBC W/AUTO DIFF WBC: CPT

## 2018-02-03 PROCEDURE — 93306 TTE W/DOPPLER COMPLETE: CPT

## 2018-02-03 PROCEDURE — 63600175 PHARM REV CODE 636 W HCPCS: Performed by: STUDENT IN AN ORGANIZED HEALTH CARE EDUCATION/TRAINING PROGRAM

## 2018-02-03 PROCEDURE — 99223 1ST HOSP IP/OBS HIGH 75: CPT | Mod: ,,, | Performed by: HOSPITALIST

## 2018-02-03 PROCEDURE — 93306 TTE W/DOPPLER COMPLETE: CPT | Mod: 26,,, | Performed by: INTERNAL MEDICINE

## 2018-02-03 PROCEDURE — 83605 ASSAY OF LACTIC ACID: CPT

## 2018-02-03 PROCEDURE — 25000003 PHARM REV CODE 250: Performed by: INTERNAL MEDICINE

## 2018-02-03 PROCEDURE — 84145 PROCALCITONIN (PCT): CPT

## 2018-02-03 PROCEDURE — 84484 ASSAY OF TROPONIN QUANT: CPT

## 2018-02-03 PROCEDURE — 84100 ASSAY OF PHOSPHORUS: CPT

## 2018-02-03 PROCEDURE — 63600175 PHARM REV CODE 636 W HCPCS: Performed by: INTERNAL MEDICINE

## 2018-02-03 PROCEDURE — 11000001 HC ACUTE MED/SURG PRIVATE ROOM

## 2018-02-03 PROCEDURE — 80053 COMPREHEN METABOLIC PANEL: CPT

## 2018-02-03 PROCEDURE — 93005 ELECTROCARDIOGRAM TRACING: CPT

## 2018-02-03 PROCEDURE — 82533 TOTAL CORTISOL: CPT

## 2018-02-03 PROCEDURE — 83036 HEMOGLOBIN GLYCOSYLATED A1C: CPT

## 2018-02-03 RX ORDER — TACROLIMUS 1 MG/G
1 OINTMENT TOPICAL 2 TIMES DAILY PRN
Status: DISCONTINUED | OUTPATIENT
Start: 2018-02-03 | End: 2018-02-03

## 2018-02-03 RX ORDER — IBUPROFEN 200 MG
24 TABLET ORAL
Status: DISCONTINUED | OUTPATIENT
Start: 2018-02-03 | End: 2018-02-05 | Stop reason: HOSPADM

## 2018-02-03 RX ORDER — DIPHENOXYLATE HYDROCHLORIDE AND ATROPINE SULFATE 2.5; .025 MG/1; MG/1
1 TABLET ORAL 4 TIMES DAILY PRN
Status: DISCONTINUED | OUTPATIENT
Start: 2018-02-03 | End: 2018-02-05 | Stop reason: HOSPADM

## 2018-02-03 RX ORDER — SODIUM CHLORIDE 9 MG/ML
INJECTION, SOLUTION INTRAVENOUS CONTINUOUS
Status: DISCONTINUED | OUTPATIENT
Start: 2018-02-03 | End: 2018-02-03

## 2018-02-03 RX ORDER — PREDNISONE 10 MG/1
10 TABLET ORAL DAILY
Status: DISCONTINUED | OUTPATIENT
Start: 2018-02-04 | End: 2018-02-05 | Stop reason: HOSPADM

## 2018-02-03 RX ORDER — LOPERAMIDE HYDROCHLORIDE 2 MG/1
2 CAPSULE ORAL DAILY PRN
Status: DISCONTINUED | OUTPATIENT
Start: 2018-02-03 | End: 2018-02-05 | Stop reason: HOSPADM

## 2018-02-03 RX ORDER — CITALOPRAM 20 MG/1
20 TABLET, FILM COATED ORAL DAILY
Status: DISCONTINUED | OUTPATIENT
Start: 2018-02-03 | End: 2018-02-05 | Stop reason: HOSPADM

## 2018-02-03 RX ORDER — GLUCAGON 1 MG
1 KIT INJECTION
Status: DISCONTINUED | OUTPATIENT
Start: 2018-02-03 | End: 2018-02-05 | Stop reason: HOSPADM

## 2018-02-03 RX ORDER — IBUPROFEN 200 MG
16 TABLET ORAL
Status: DISCONTINUED | OUTPATIENT
Start: 2018-02-03 | End: 2018-02-05 | Stop reason: HOSPADM

## 2018-02-03 RX ORDER — ZOLPIDEM TARTRATE 5 MG/1
10 TABLET ORAL NIGHTLY PRN
Status: DISCONTINUED | OUTPATIENT
Start: 2018-02-03 | End: 2018-02-05 | Stop reason: HOSPADM

## 2018-02-03 RX ORDER — MAGNESIUM SULFATE HEPTAHYDRATE 40 MG/ML
2 INJECTION, SOLUTION INTRAVENOUS ONCE
Status: COMPLETED | OUTPATIENT
Start: 2018-02-03 | End: 2018-02-03

## 2018-02-03 RX ORDER — ALPRAZOLAM 0.5 MG/1
0.5 TABLET ORAL 2 TIMES DAILY PRN
Status: DISCONTINUED | OUTPATIENT
Start: 2018-02-03 | End: 2018-02-05 | Stop reason: HOSPADM

## 2018-02-03 RX ORDER — LISINOPRIL 10 MG/1
10 TABLET ORAL DAILY
Status: DISCONTINUED | OUTPATIENT
Start: 2018-02-03 | End: 2018-02-05 | Stop reason: HOSPADM

## 2018-02-03 RX ORDER — DIPHENHYDRAMINE HYDROCHLORIDE 50 MG/ML
25 INJECTION INTRAMUSCULAR; INTRAVENOUS
Status: COMPLETED | OUTPATIENT
Start: 2018-02-03 | End: 2018-02-03

## 2018-02-03 RX ORDER — CEFTRIAXONE 1 G/1
1 INJECTION, POWDER, FOR SOLUTION INTRAMUSCULAR; INTRAVENOUS
Status: DISCONTINUED | OUTPATIENT
Start: 2018-02-03 | End: 2018-02-05 | Stop reason: HOSPADM

## 2018-02-03 RX ORDER — CALCIUM CARBONATE 500(1250)
500 TABLET ORAL 2 TIMES DAILY WITH MEALS
Status: DISCONTINUED | OUTPATIENT
Start: 2018-02-03 | End: 2018-02-05 | Stop reason: HOSPADM

## 2018-02-03 RX ORDER — PROMETHAZINE HYDROCHLORIDE 25 MG/1
25 TABLET ORAL EVERY 6 HOURS PRN
Status: DISCONTINUED | OUTPATIENT
Start: 2018-02-03 | End: 2018-02-05 | Stop reason: HOSPADM

## 2018-02-03 RX ORDER — PREDNISONE 5 MG/1
5 TABLET ORAL EVERY OTHER DAY
Status: DISCONTINUED | OUTPATIENT
Start: 2018-02-03 | End: 2018-02-03

## 2018-02-03 RX ORDER — OXYCODONE AND ACETAMINOPHEN 10; 325 MG/1; MG/1
1 TABLET ORAL EVERY 6 HOURS PRN
Status: DISCONTINUED | OUTPATIENT
Start: 2018-02-03 | End: 2018-02-05 | Stop reason: HOSPADM

## 2018-02-03 RX ORDER — DRONABINOL 2.5 MG/1
2.5 CAPSULE ORAL
Status: DISCONTINUED | OUTPATIENT
Start: 2018-02-03 | End: 2018-02-05 | Stop reason: HOSPADM

## 2018-02-03 RX ORDER — SODIUM CHLORIDE 0.9 % (FLUSH) 0.9 %
5 SYRINGE (ML) INJECTION
Status: DISCONTINUED | OUTPATIENT
Start: 2018-02-03 | End: 2018-02-05 | Stop reason: HOSPADM

## 2018-02-03 RX ORDER — SODIUM CHLORIDE, SODIUM LACTATE, POTASSIUM CHLORIDE, CALCIUM CHLORIDE 600; 310; 30; 20 MG/100ML; MG/100ML; MG/100ML; MG/100ML
INJECTION, SOLUTION INTRAVENOUS CONTINUOUS
Status: ACTIVE | OUTPATIENT
Start: 2018-02-03 | End: 2018-02-03

## 2018-02-03 RX ORDER — ONDANSETRON 4 MG/1
8 TABLET, ORALLY DISINTEGRATING ORAL EVERY 8 HOURS PRN
Status: DISCONTINUED | OUTPATIENT
Start: 2018-02-03 | End: 2018-02-05 | Stop reason: HOSPADM

## 2018-02-03 RX ADMIN — ONDANSETRON 8 MG: 4 TABLET, ORALLY DISINTEGRATING ORAL at 02:02

## 2018-02-03 RX ADMIN — DRONABINOL 2.5 MG: 2.5 CAPSULE ORAL at 12:02

## 2018-02-03 RX ADMIN — MAGNESIUM SULFATE IN WATER 2 G: 40 INJECTION, SOLUTION INTRAVENOUS at 08:02

## 2018-02-03 RX ADMIN — CITALOPRAM HYDROBROMIDE 20 MG: 20 TABLET ORAL at 08:02

## 2018-02-03 RX ADMIN — SODIUM CHLORIDE: 0.9 INJECTION, SOLUTION INTRAVENOUS at 05:02

## 2018-02-03 RX ADMIN — HYDROCORTISONE SODIUM SUCCINATE 100 MG: 100 INJECTION, POWDER, FOR SOLUTION INTRAMUSCULAR; INTRAVENOUS at 01:02

## 2018-02-03 RX ADMIN — ALPRAZOLAM 0.5 MG: 0.5 TABLET ORAL at 10:02

## 2018-02-03 RX ADMIN — CEFTRIAXONE SODIUM 1 G: 1 INJECTION, POWDER, FOR SOLUTION INTRAMUSCULAR; INTRAVENOUS at 09:02

## 2018-02-03 RX ADMIN — ZOLPIDEM TARTRATE 10 MG: 5 TABLET ORAL at 10:02

## 2018-02-03 RX ADMIN — DIPHENHYDRAMINE HYDROCHLORIDE 25 MG: 50 INJECTION, SOLUTION INTRAMUSCULAR; INTRAVENOUS at 12:02

## 2018-02-03 RX ADMIN — CALCIUM 500 MG: 500 TABLET ORAL at 05:02

## 2018-02-03 RX ADMIN — SODIUM CHLORIDE, SODIUM LACTATE, POTASSIUM CHLORIDE, AND CALCIUM CHLORIDE: .6; .31; .03; .02 INJECTION, SOLUTION INTRAVENOUS at 07:02

## 2018-02-03 RX ADMIN — HYDROCORTISONE SODIUM SUCCINATE 100 MG: 100 INJECTION, POWDER, FOR SOLUTION INTRAMUSCULAR; INTRAVENOUS at 09:02

## 2018-02-03 RX ADMIN — OXYCODONE HYDROCHLORIDE AND ACETAMINOPHEN 1 TABLET: 10; 325 TABLET ORAL at 05:02

## 2018-02-03 RX ADMIN — OXYCODONE HYDROCHLORIDE AND ACETAMINOPHEN 1 TABLET: 10; 325 TABLET ORAL at 01:02

## 2018-02-03 RX ADMIN — DRONABINOL 2.5 MG: 2.5 CAPSULE ORAL at 05:02

## 2018-02-03 RX ADMIN — LISINOPRIL 10 MG: 10 TABLET ORAL at 08:02

## 2018-02-03 RX ADMIN — ALUMINUM HYDROXIDE, MAGNESIUM HYDROXIDE, AND SIMETHICONE: 200; 200; 20 SUSPENSION ORAL at 10:02

## 2018-02-03 RX ADMIN — SODIUM CHLORIDE, SODIUM LACTATE, POTASSIUM CHLORIDE, AND CALCIUM CHLORIDE: .6; .31; .03; .02 INJECTION, SOLUTION INTRAVENOUS at 10:02

## 2018-02-03 RX ADMIN — DRONABINOL 2.5 MG: 2.5 CAPSULE ORAL at 08:02

## 2018-02-03 RX ADMIN — ONDANSETRON 8 MG: 4 TABLET, ORALLY DISINTEGRATING ORAL at 10:02

## 2018-02-03 RX ADMIN — OXYCODONE HYDROCHLORIDE AND ACETAMINOPHEN 1 TABLET: 10; 325 TABLET ORAL at 09:02

## 2018-02-03 RX ADMIN — PREDNISONE 5 MG: 5 TABLET ORAL at 08:02

## 2018-02-03 NOTE — HPI
Ms Salgado is a 38 yo F with Chrons IBD s/p ileostomy, ESBL UTI, previously on biologic therapy, prednisone, HTN who presents the ED via EMS for complaints of pleuritic chest pain occurring intermittently since 8:30 PM. She states it was substernal and in the epigastrium and resolved without intervention. She reports recent workup for pulmonary embolus with similar symptoms, and workup was negative to date. She reports palpitations and nausea, and diarrhea per Crohns baseline. Her ostomy site is relatively unchanged for baseline, and patient reports a facial erythematous rash today that was recently seen by Dermatology, which she describes as an AE to previous biologic therapy. In the ED, blood pressure was stable,patient was tachycardic with initial lactate of 4, which improved with 2L IVF. She denies fever, chills, cough, chills and UA was nitrite/ LE positive.

## 2018-02-03 NOTE — CONSULTS
Critical Care Consult Received. Patient will be seen.     Full Note To Follow.           Riley Talamantes MD     Internal Medicine PGY-1     #[phone]

## 2018-02-03 NOTE — ED TRIAGE NOTES
"Presents to ED with sudden onset midsternal "burning" chest pain that began approximately 40 mins PTA, accompanying symptoms of nausea and SOB. Pt rates current pain at 2/10 but says the pain "comes in waves", during work up pt said the pain was returning, appears distressed with a HR of >160 and rates at 8/10. Physician aware, pain medication and anxiolytic administered.   "

## 2018-02-03 NOTE — ASSESSMENT & PLAN NOTE
Pt says she takes 5 mg prednisone QOD, will continue  - Given pt's long-term steroid use will get random cortisol, if not adequately elevated can add hydrocortisone 100 TID i.e. stress dose steroids  - Pt may have relative adrenal insufficiency in the setting of long term steroid and pt's chronically dehydrated state (per pt), may be contributing to tachycardia

## 2018-02-03 NOTE — ED PROVIDER NOTES
Encounter Date: 2/2/2018    SCRIBE #1 NOTE: I, Nalini Small, am scribing for, and in the presence of,  Dr. Mathew. I have scribed the following portions of the note - the EKG reading and the APC attestation. Other sections scribed: Critical Care.       History     Chief Complaint   Patient presents with    Chest Pain     CP and SOB. Hx of crohns.     37 year old female with medical history of HTN, Crohn's disease with ileostomy and prednisone therapy, anxiety presenting to the ED with the chief complaint of chest pain. Patient reports sitting on her couch ~830pm today and developing sudden chest pain. She describes the chest pain as a substernal and epigastric burning and squeezing type pain. She has associated tachycardia, nausea, and shortness of breath during the episodes. She reports having numbness in her hands and feet. She reports having similar pain 1 week ago and presented to [facility] ED. She received labs and PE CT study which were unremarkable. She denies heart disease history including supraventricular tachycardias. She reports taking Prilosec for acid reflux and states she did not take any today. She denies fever, vomiting, bowel movement and urinary changes.           Review of patient's allergies indicates:   Allergen Reactions    Azathioprine sodium      Other reaction(s): pancreatitis  Other reaction(s): pancreatitis    Methotrexate      Other reaction(s): infection-    Morphine Itching and Other (See Comments)     Other reaction(s): Itching     Past Medical History:   Diagnosis Date    Abnormal Pap smear 2007    Abnormal Pap smear 5/26/2011    Anemia     Anxiety     Arthritis     C. difficile diarrhea     Crohn's disease     Depression 8/5/2017    Encounter for blood transfusion     Genital HSV     History of colposcopy with cervical biopsy 2007 and 7/2011 2007-LYLA I  and 7/2011- LYLA I    Hypertension     Kidney stone     Kidney stone     Recurrent UTI 4/3/2013    S/P  ileostomy 7/9/2012    Sterilization 6/23/2012     Past Surgical History:   Procedure Laterality Date    ABDOMINAL SURGERY      APPENDECTOMY      BLADDER SURGERY      partial cystectomy due to fistula    CKC      COLON SURGERY      COLONOSCOPY      HYSTERECTOMY  04/16/2015    SHELLIE    ILEOSTOMY      OOPHORECTOMY Right 04/16/2015    PORTACATH PLACEMENT      SKIN BIOPSY      SMALL INTESTINE SURGERY      TOTAL COLECTOMY      TUBAL LIGATION  06 06 2012    UPPER GASTROINTESTINAL ENDOSCOPY       Family History   Problem Relation Age of Onset    Colon cancer Father     Cancer Father      colon cancer    Hypertension Mother     Cancer Maternal Grandfather      esopghal     Skin cancer Maternal Grandfather     Endometrial cancer Maternal Aunt     Crohn's disease Brother     Breast cancer Neg Hx     Ovarian cancer Neg Hx      Social History   Substance Use Topics    Smoking status: Never Smoker    Smokeless tobacco: Never Used    Alcohol use 0.0 oz/week      Comment: Patient only drinks wine not regular basis.     Review of Systems   Constitutional: Negative for chills and fever.   HENT: Negative for congestion, sore throat and trouble swallowing.    Eyes: Negative for pain and redness.   Respiratory: Positive for shortness of breath. Negative for cough.    Cardiovascular: Positive for chest pain.   Gastrointestinal: Positive for abdominal pain (epigastric) and nausea. Negative for blood in stool, diarrhea and vomiting.   Genitourinary: Negative for dysuria and hematuria.   Musculoskeletal: Negative for back pain, neck pain and neck stiffness.   Skin: Negative for rash and wound.   Neurological: Positive for numbness (hands and feet). Negative for weakness, light-headedness and headaches.       Physical Exam     Initial Vitals [02/02/18 2049]   BP Pulse Resp Temp SpO2   (!) 144/94 (!) 130 18 -- 99 %      MAP       110.67         Physical Exam    Constitutional: She appears well-developed. She is not  diaphoretic. She appears distressed.   HENT:   Head: Normocephalic and atraumatic.   Mouth/Throat: Oropharynx is clear and moist. No oropharyngeal exudate.   Eyes: EOM are normal. Pupils are equal, round, and reactive to light.   Neck: Normal range of motion. Neck supple.   Cardiovascular: Regular rhythm and intact distal pulses. Tachycardia present.    Pulmonary/Chest: Breath sounds normal. No respiratory distress. She has no wheezes. She exhibits no tenderness.   Abdominal: Soft. Bowel sounds are normal. She exhibits no distension. There is no tenderness.   Ileostomy in place. C/d/i   Musculoskeletal: Normal range of motion. She exhibits no edema or tenderness.   Neurological: She is alert and oriented to person, place, and time. She has normal strength. No cranial nerve deficit.   She is tremulous.   Skin: Skin is warm and dry. No erythema.   Skin is flushed       Imaging Results          X-Ray Chest PA And Lateral (Final result)  Result time 02/02/18 22:48:28    Final result by Anthony Dunbar MD (02/02/18 22:48:28)                 Impression:         No acute findings in the chest.          Electronically signed by: ANTHONY DUNBAR MD  Date:     02/02/18  Time:    22:48              Narrative:    Chest PA and Lateral    Indication:.    Comparison:January 27, 2018.    Findings:     Left subclavian port catheter tip overlies the SVC.    The cardiomediastinal silhouette is within normal limits.  There is no pleural effusion.  The trachea is midline.  The lungs are symmetrically expanded bilaterally without evidence of acute parenchymal process.  There is no pneumothorax.  The osseous structures are unremarkable.                            ED Course   Critical Care  Date/Time: 2/2/2018 9:36 PM  Performed by: MAURILIO HERCULES  Authorized by: MAURILIO HERCULES   Direct patient critical care time: 15 minutes  Additional history critical care time: 5 minutes  Ordering / reviewing critical care time: 10  minutes  Documentation critical care time: 5 minutes  Consulting other physicians critical care time: 15 minutes  Total critical care time (exclusive of procedural time) : 50 minutes  Critical care was necessary to treat or prevent imminent or life-threatening deterioration of the following conditions: circulatory failure, cardiac failure and sepsis.  Critical care was time spent personally by me on the following activities: discussions with consultants, interpretation of cardiac output measurements, evaluation of patient's response to treatment, examination of patient, obtaining history from patient or surrogate, ordering and performing treatments and interventions, ordering and review of laboratory studies, pulse oximetry, re-evaluation of patient's condition, review of old charts and ordering and review of radiographic studies.        Labs Reviewed   CBC W/ AUTO DIFFERENTIAL - Abnormal; Notable for the following:        Result Value    Platelets 355 (*)     All other components within normal limits   COMPREHENSIVE METABOLIC PANEL - Abnormal; Notable for the following:     Glucose 135 (*)     Total Protein 8.6 (*)     All other components within normal limits   LACTIC ACID, PLASMA - Abnormal; Notable for the following:     Lactate (Lactic Acid) 4.9 (*)     All other components within normal limits   URINALYSIS, REFLEX TO URINE CULTURE - Abnormal; Notable for the following:     Appearance, UA Hazy (*)     Nitrite, UA Positive (*)     Leukocytes, UA 2+ (*)     All other components within normal limits    Narrative:     Preferred Collection Type->Urine, Clean Catch   URINALYSIS MICROSCOPIC - Abnormal; Notable for the following:     WBC, UA >100 (*)     Bacteria, UA Many (*)     All other components within normal limits    Narrative:     Preferred Collection Type->Urine, Clean Catch   ISTAT PROCEDURE - Abnormal; Notable for the following:     POC HCO3 22.5 (*)     POC SATURATED O2 83 (*)     All other components  within normal limits   CULTURE, URINE   PROTIME-INR   TROPONIN I   MAGNESIUM   LIPASE   LACTIC ACID, PLASMA     EKG Readings: (Independently Interpreted)   Sinus tachycardia with a nml axis. 1-2 mm of ST segment depression noted to V4 and V5 at 155 bpm.          Medical Decision Making:   History:   Old Medical Records: I decided to obtain old medical records.  Independently Interpreted Test(s):   I have ordered and independently interpreted EKG Reading(s) - see prior notes  Clinical Tests:   Lab Tests: Ordered and Reviewed  Medical Tests: Ordered and Reviewed       APC / Resident Notes:   37 year old female with medical history of HTN, Crohn's disease with ileostomy and prednisone therapy, anxiety presenting to the ED c/o chest pain, tachycardia, and nausea for 1 day. DDx includes but not limited to GERD, ACS, cholecystitis, pulmonary embolism, pneumothorax, anxiety, medication side effect. Will proceed with labs. Will give fluids, fentanyl, ativan, pepcid, zofran for symptoms.    ECG - Sinus tachycardia at 155 bpm with ST segment depression in V4, V5.     10:26 PM  Patient's lactate elevated at 4.9 without clear infectious etiology. Blood cultures, CXR, UA ordered for further investigation. Will start Vanc and Cefepime. Will continue to give fluids. Critical care medicine consulted regarding lactic acidosis and they will come evaluate the patient.     Work-up significant for UTI (Nitrite+, Leuko +2, >100 WBC). WBC 9.5 without left shift. CXR no acute findings. Electrolyte stable. Troponin <0.006    Repeat POCT Lactate shows improvement 4.9 >> 0.54. Patient's HR improved but remains elevated 100-120. Discussed patient with critical care who believe patient is stable for the floor given lactate has improved. Will admit patient for urosepsis to receive further hydration and continued IV antibiotics. Patient developed skin erythema on her arms after initiation of vancomycin which was stopped and benadryl was  administered. Patient's reports relief of her chest pain and states it is 0/10 on reassessment. All of the patient's questions were answered. I have discussed the care of this patient with my supervising physician.        Scribe Attestation:   Scribe #1: I performed the above scribed service and the documentation accurately describes the services I performed. I attest to the accuracy of the note.    Attending Attestation:     Physician Attestation Statement for NP/PA:   I have conducted a face to face encounter with this patient in addition to the NP/PA, due to Medical Complexity    Other NP/PA Attestation Additions:    History of Present Illness: 37 y.o. female with co-morbidities of Crohn's disease presents by EMS for tachycardia and chest discomfort that began earlier today while at rest.                   ED Course      Clinical Impression:   The primary encounter diagnosis was Sepsis, due to unspecified organism. Diagnoses of Chest pain, Lactic acidosis, Urinary tract infection without hematuria, site unspecified, Tachycardia, Ileostomy present, and Palpitations were also pertinent to this visit.    Disposition:   Disposition: Admitted  Condition: Fair                        Kailash Euceda PA-C  02/03/18 1624       Salvatore Mathew MD  02/04/18 1118

## 2018-02-03 NOTE — SUBJECTIVE & OBJECTIVE
Past Medical History:   Diagnosis Date    Abnormal Pap smear 2007    Abnormal Pap smear 5/26/2011    Anemia     Anxiety     Arthritis     C. difficile diarrhea     Crohn's disease     Depression 8/5/2017    Encounter for blood transfusion     Genital HSV     History of colposcopy with cervical biopsy 2007 and 7/2011 2007-LYLA I  and 7/2011- LYLA I    Hypertension     Kidney stone     Kidney stone     Recurrent UTI 4/3/2013    S/P ileostomy 7/9/2012    Sterilization 6/23/2012       Past Surgical History:   Procedure Laterality Date    ABDOMINAL SURGERY      APPENDECTOMY      BLADDER SURGERY      partial cystectomy due to fistula    CKC      COLON SURGERY      COLONOSCOPY      HYSTERECTOMY  04/16/2015    SHELLIE    ILEOSTOMY      OOPHORECTOMY Right 04/16/2015    PORTACATH PLACEMENT      SKIN BIOPSY      SMALL INTESTINE SURGERY      TOTAL COLECTOMY      TUBAL LIGATION  06 06 2012    UPPER GASTROINTESTINAL ENDOSCOPY         Review of patient's allergies indicates:   Allergen Reactions    Vancomycin analogues Hives    Azathioprine sodium      Other reaction(s): pancreatitis  Other reaction(s): pancreatitis    Methotrexate      Other reaction(s): infection-    Morphine Itching and Other (See Comments)     Other reaction(s): Itching       Family History     Problem Relation (Age of Onset)    Cancer Father, Maternal Grandfather    Colon cancer Father    Crohn's disease Brother    Endometrial cancer Maternal Aunt    Hypertension Mother    Skin cancer Maternal Grandfather        Social History Main Topics    Smoking status: Never Smoker    Smokeless tobacco: Never Used    Alcohol use 0.0 oz/week      Comment: Patient only drinks wine not regular basis.    Drug use: No    Sexual activity: Yes     Partners: Male     Birth control/ protection: Pill, Surgical, Condom      Comment: HYST      Review of Systems   Constitutional: Positive for appetite change. Negative for chills, diaphoresis,  fatigue, fever and unexpected weight change.   HENT: Negative for rhinorrhea, sinus pressure, sore throat and tinnitus.    Eyes: Negative for photophobia and visual disturbance.   Respiratory: Negative for apnea, cough, choking, chest tightness, shortness of breath and stridor.    Cardiovascular: Negative for chest pain, palpitations and leg swelling.   Gastrointestinal: Positive for diarrhea and nausea. Negative for abdominal distention, abdominal pain, constipation, rectal pain and vomiting.   Genitourinary: Positive for frequency. Negative for dysuria, flank pain, menstrual problem, urgency, vaginal bleeding and vaginal discharge.   Musculoskeletal: Negative for arthralgias, gait problem, joint swelling, neck pain and neck stiffness.   Skin: Positive for rash. Negative for color change and wound.   Allergic/Immunologic: Negative for immunocompromised state.   Neurological: Negative for dizziness, speech difficulty, light-headedness and headaches.   Hematological: Negative for adenopathy.   Psychiatric/Behavioral: Negative for agitation.     Objective:     Vital Signs (Most Recent):  Temp: 99 °F (37.2 °C) (02/02/18 2216)  Pulse: 106 (02/03/18 0022)  Resp: 18 (02/03/18 0022)  BP: 126/73 (02/03/18 0022)  SpO2: 99 % (02/03/18 0022) Vital Signs (24h Range):  Temp:  [99 °F (37.2 °C)] 99 °F (37.2 °C)  Pulse:  [] 106  Resp:  [14-85] 18  SpO2:  [99 %-100 %] 99 %  BP: (126-149)/(73-95) 126/73   Weight: 45.4 kg (100 lb)  Body mass index is 18.89 kg/m².      Intake/Output Summary (Last 24 hours) at 02/03/18 0055  Last data filed at 02/03/18 0027   Gross per 24 hour   Intake             2300 ml   Output                0 ml   Net             2300 ml       Physical Exam   Constitutional: She is oriented to person, place, and time. No distress.   HENT:   Mouth/Throat: No oropharyngeal exudate.   Eyes: EOM are normal. Pupils are equal, round, and reactive to light. Right eye exhibits no discharge. Left eye exhibits no  discharge. No scleral icterus.   Neck: Normal range of motion. No JVD present. No tracheal deviation present.   Cardiovascular: Regular rhythm, normal heart sounds and intact distal pulses.  Exam reveals no gallop and no friction rub.    No murmur heard.  Rate tachy   Pulmonary/Chest: Effort normal and breath sounds normal. No respiratory distress. She has no wheezes. She has no rales. She exhibits no tenderness.   Abdominal: Soft. Bowel sounds are normal. She exhibits no distension. There is no tenderness. There is no rebound and no guarding.   Ostomy site clean, no purulence    Musculoskeletal: Normal range of motion. She exhibits no edema, tenderness or deformity.   Neurological: She is alert and oriented to person, place, and time. She displays normal reflexes. No cranial nerve deficit or sensory deficit.   Skin: Skin is warm and dry. Capillary refill takes less than 2 seconds. Rash noted. She is not diaphoretic. No erythema. No pallor.   Facial erythema    Psychiatric: She has a normal mood and affect.       Vents:     Lines/Drains/Airways     Drain                 Ileostomy RUQ -- days          Peripheral Intravenous Line                 Peripheral IV - Single Lumen 02/02/18 2111 Left Forearm less than 1 day              Significant Labs:    CBC/Anemia Profile:    Recent Labs  Lab 02/02/18 2111   WBC 9.56   HGB 13.7   HCT 40.2   *   MCV 91   RDW 13.6        Chemistries:    Recent Labs  Lab 02/02/18 2111      K 3.5      CO2 23   BUN 7   CREATININE 0.8   CALCIUM 9.4   ALBUMIN 3.8   PROT 8.6*   BILITOT 0.3   ALKPHOS 62   ALT 18   AST 33   MG 2.2       A1C: No results for input(s): HGBA1C in the last 48 hours.  ABGs:   Recent Labs  Lab 02/02/18  2339   PH 7.358   PCO2 40.1   HCO3 22.5*   POCSATURATED 83*   BE -3     Bilirubin:   Recent Labs  Lab 02/02/18 2111   BILITOT 0.3     Blood Culture: No results for input(s): LABBLOO in the last 48 hours.  BMP:   Recent Labs  Lab 02/02/18 2111   GLU  135*      K 3.5      CO2 23   BUN 7   CREATININE 0.8   CALCIUM 9.4   MG 2.2     CMP:   Recent Labs  Lab 02/02/18 2111      K 3.5      CO2 23   *   BUN 7   CREATININE 0.8   CALCIUM 9.4   PROT 8.6*   ALBUMIN 3.8   BILITOT 0.3   ALKPHOS 62   AST 33   ALT 18   ANIONGAP 13   EGFRNONAA >60.0     Cardiac Markers: No results for input(s): CKMB, TROPONINT, MYOGLOBIN in the last 48 hours.  Coagulation:   Recent Labs  Lab 02/02/18 2111   INR 1.1     Lactic Acid:   Recent Labs  Lab 02/02/18 2111   LACTATE 4.9*     Lipid Panel: No results for input(s): CHOL, HDL, LDLCALC, TRIG, CHOLHDL in the last 48 hours.  POCT Glucose: No results for input(s): POCTGLUCOSE in the last 48 hours.  Prealbumin: No results for input(s): PREALBUMIN in the last 48 hours.  Respiratory Culture: No results for input(s): GSRESP, RESPIRATORYC in the last 48 hours.  Troponin:   Recent Labs  Lab 02/02/18 2111   TROPONINI <0.006     Urine Culture: No results for input(s): LABURIN in the last 48 hours.  Urine Studies:   Recent Labs  Lab 02/02/18  2258   COLORU Yellow   APPEARANCEUA Hazy*   PHUR 5.0   SPECGRAV 1.020   PROTEINUA Negative   GLUCUA Negative   KETONESU Negative   BILIRUBINUA Negative   OCCULTUA Negative   NITRITE Positive*   UROBILINOGEN Negative   LEUKOCYTESUR 2+*   RBCUA 2   WBCUA >100*   BACTERIA Many*   SQUAMEPITHEL 1     All pertinent labs within the past 24 hours have been reviewed.    Significant Imaging: I have reviewed and interpreted all pertinent imaging results/findings within the past 24 hours.

## 2018-02-03 NOTE — H&P
"[facility] Hospital Medicine  History & Physical    Patient Name: Ivy Salgado  MRN: [id number]  Admission Date: 2/2/2018  Attending Physician: Flaquita Miller MD   Primary Care Provider: Kalia Astorga MD    [facility] Medicine Team: [facility]OSP MED 3 Shila Sanz MD     Patient information was obtained from patient, past medical records and ER records.     Subjective:     Principal Problem:Sepsis secondary to UTI    Chief Complaint:   Chief Complaint   Patient presents with    Chest Pain     CP and SOB. Hx of crohns.        HPI: 37F with Crohn's disease (diagnosed at age 10) s/p TAC + ileostomy in 2012 on 5mg pred QOD recently stopped stelara, anxiety, HTN who presents to the ED for chest pain and SOB. Pt says that she was sitting on her couch watching TV when she suddenly developed chest pain, shortness of breath, nausea, and palpitations. She called 911 because she felt like she "was going to pass out." She says that this is identical to her episode 1 week prior during which she was worked up for PE (CTA negative).     In the ED pt was given 2L NS and critical care was consulted for pt's initial lactate of 4.9, which decreased to 0.7. UA showed bacteria, >100 WBCs, nitrites, and leukocyte esterase; pt denied dysuria, hematuria, or increase urinary frequency. Pt says she felt better after fluids and was given cefepime x1 in the ED for empiric coverage of UTI. Pt has had ESBL in the past (last positive culture 07/2017) but subsequent urine cultures were negative and pt does not have any indwelling catheter, stents, or nephrostomy tubes. Denies syncope, headache, vision changes. Denies recent fever, chills, n/v, diarrhea/constipation (however, pt says that she has had increased ostomy output and been dehydrated for approx 1 year, when her last CD flare began).    Pt was admitted to Hospital Medicine for further monitoring and treatment of urosepsis.    Past Medical History:   Diagnosis Date "    Abnormal Pap smear 2007    Abnormal Pap smear 5/26/2011    Anemia     Anxiety     Arthritis     C. difficile diarrhea     Crohn's disease     Depression 8/5/2017    Encounter for blood transfusion     Genital HSV     History of colposcopy with cervical biopsy 2007 and 7/2011 2007-LYLA I  and 7/2011- LYLA I    Hypertension     Kidney stone     Kidney stone     Recurrent UTI 4/3/2013    S/P ileostomy 7/9/2012    Sterilization 6/23/2012       Past Surgical History:   Procedure Laterality Date    ABDOMINAL SURGERY      APPENDECTOMY      BLADDER SURGERY      partial cystectomy due to fistula    CKC      COLON SURGERY      COLONOSCOPY      HYSTERECTOMY  04/16/2015    SHELLIE    ILEOSTOMY      OOPHORECTOMY Right 04/16/2015    PORTACATH PLACEMENT      SKIN BIOPSY      SMALL INTESTINE SURGERY      TOTAL COLECTOMY      TUBAL LIGATION  06 06 2012    UPPER GASTROINTESTINAL ENDOSCOPY         Review of patient's allergies indicates:   Allergen Reactions    Vancomycin analogues Hives    Azathioprine sodium      Other reaction(s): pancreatitis  Other reaction(s): pancreatitis    Methotrexate      Other reaction(s): infection-    Morphine Itching and Other (See Comments)     Other reaction(s): Itching       No current facility-administered medications on file prior to encounter.      Current Outpatient Prescriptions on File Prior to Encounter   Medication Sig    acetaminophen (TYLENOL) 650 MG TbSR Take 650 mg by mouth daily as needed (fever/pain).    ALPRAZolam (XANAX) 0.5 MG tablet TAKE 1 TABLET BY MOUTH TWICE A DAY    citalopram (CELEXA) 20 MG tablet Take 1 tablet (20 mg total) by mouth once daily.    diphenoxylate-atropine 2.5-0.025 mg (LOMOTIL) 2.5-0.025 mg per tablet TAKE 1 TABLET BY MOUTH 3 TIMES A DAY AS NEEDED FOR DIARRHEA    dronabinol (MARINOL) 2.5 MG capsule Take 1 capsule (2.5 mg total) by mouth 2 (two) times daily before meals.    IBUPROFEN ORAL Take 400-600 mg by mouth daily as  needed (fever/pain).    lisinopril 10 MG tablet TAKE 1 TABLET (10 MG TOTAL) BY MOUTH ONCE DAILY.    loperamide (IMODIUM) 2 mg capsule Take 2 mg by mouth daily as needed for Diarrhea.    metroNIDAZOLE (FLAGYL) 500 MG tablet TAKE 1 TABLET (500 MG TOTAL) BY MOUTH 3 (THREE) TIMES DAILY.    ondansetron (ZOFRAN-ODT) 8 MG TbDL TAKE 1 TABLET (8 MG TOTAL) BY MOUTH EVERY 8 (EIGHT) HOURS AS NEEDED (NAUSEA).    oxyCODONE-acetaminophen (PERCOCET)  mg per tablet Take 1 tablet by mouth every 6 (six) hours as needed for Pain.    potassium citrate 15 mEq TbSR Take 2 tablets by mouth 2 (two) times daily.    promethazine (PHENERGAN) 25 MG tablet TAKE 1 TABLET BY MOUTH EVERY 6 HOURS AS NEEDED FOR NAUSEA    sumatriptan (IMITREX) 100 MG tablet TAKE 1 TABLET BY MOUTH EVERY 2 HOURS AS NEEDED FOR MIGRAINE. DO NOT EXCEED 2 DOSES/ 24HRS    tacrolimus (PROTOPIC) 0.1 % ointment Apply 1 application topically 2 (two) times daily as needed (for rash on face).     zolpidem (AMBIEN) 10 mg Tab TAKE 1 TABLET (10 MG TOTAL) BY MOUTH EVERY EVENING.     Family History     Problem Relation (Age of Onset)    Cancer Father, Maternal Grandfather    Colon cancer Father    Crohn's disease Brother    Endometrial cancer Maternal Aunt    Hypertension Mother    Skin cancer Maternal Grandfather        Social History Main Topics    Smoking status: Never Smoker    Smokeless tobacco: Never Used    Alcohol use 0.0 oz/week      Comment: Patient only drinks wine not regular basis.    Drug use: No    Sexual activity: Yes     Partners: Male     Birth control/ protection: Pill, Surgical, Condom      Comment: HYST     Review of Systems   Constitutional: Positive for activity change and diaphoresis. Negative for fatigue and fever.   Respiratory: Positive for chest tightness and shortness of breath. Negative for cough, wheezing and stridor.    Cardiovascular: Positive for chest pain and palpitations. Negative for leg swelling.   Gastrointestinal: Positive  for nausea. Negative for abdominal distention, abdominal pain, constipation, diarrhea and vomiting.   Genitourinary: Negative for dysuria, flank pain, frequency and hematuria.   Musculoskeletal: Negative for arthralgias, back pain, gait problem, joint swelling, myalgias, neck pain and neck stiffness.   Neurological: Negative for seizures, syncope, facial asymmetry, light-headedness and headaches.     Objective:     Vital Signs (Most Recent):  Temp: 99.1 °F (37.3 °C) (02/03/18 0228)  Pulse: 90 (02/03/18 0221)  Resp: 18 (02/03/18 0201)  BP: 117/83 (02/03/18 0221)  SpO2: 98 % (02/03/18 0221) Vital Signs (24h Range):  Temp:  [99 °F (37.2 °C)-99.1 °F (37.3 °C)] 99.1 °F (37.3 °C)  Pulse:  [] 90  Resp:  [14-85] 18  SpO2:  [97 %-100 %] 98 %  BP: (113-149)/(69-95) 117/83     Weight: 45.4 kg (100 lb)  Body mass index is 18.89 kg/m².    Physical Exam   Constitutional: She appears well-developed.   HENT:   Head: Normocephalic and atraumatic.   Right Ear: External ear normal.   Left Ear: External ear normal.   Nose: Nose normal.   Eyes: EOM are normal. Pupils are equal, round, and reactive to light.   Neck: No tracheal deviation present. No thyromegaly present.   Cardiovascular: Normal rate.    No murmur heard.  Pulmonary/Chest: Effort normal and breath sounds normal.   Abdominal: Soft. There is no tenderness. No hernia.   Musculoskeletal: She exhibits no edema.   Neurological: She displays normal reflexes. No cranial nerve deficit.   Skin: No rash noted.   Psychiatric: She has a normal mood and affect. Judgment normal.   Vitals reviewed.        CRANIAL NERVES     CN III, IV, VI   Pupils are equal, round, and reactive to light.  Extraocular motions are normal.        Significant Labs:   CBC:   Recent Labs  Lab 02/02/18 2111   WBC 9.56   HGB 13.7   HCT 40.2   *     CMP:   Recent Labs  Lab 02/02/18 2111      K 3.5      CO2 23   *   BUN 7   CREATININE 0.8   CALCIUM 9.4   PROT 8.6*   ALBUMIN 3.8    BILITOT 0.3   ALKPHOS 62   AST 33   ALT 18   ANIONGAP 13   EGFRNONAA >60.0     Lactic Acid:   Recent Labs  Lab 02/02/18 2111 02/03/18  0030   LACTATE 4.9* 0.7     Troponin:   Recent Labs  Lab 02/02/18 2111   TROPONINI <0.006     Urine Studies:   Recent Labs  Lab 02/02/18  2258   COLORU Yellow   APPEARANCEUA Hazy*   PHUR 5.0   SPECGRAV 1.020   PROTEINUA Negative   GLUCUA Negative   KETONESU Negative   BILIRUBINUA Negative   OCCULTUA Negative   NITRITE Positive*   UROBILINOGEN Negative   LEUKOCYTESUR 2+*   RBCUA 2   WBCUA >100*   BACTERIA Many*   SQUAMEPITHEL 1       Significant Imaging: I have reviewed all pertinent imaging results/findings within the past 24 hours.    Assessment/Plan:     * Sepsis secondary to UTI    Presented with chest pain, SOB, tachcyardia that improved with IVF  - UA shows >100 WBC + bacteria + nitrites + leukocyte esterase, despite being asymptomatic inflammatory response to bacteria suggests infection over bacteriuria   - Pt has hx ESBL 07/2017 but does not have chronic indwelling huffman or other factors that would cause colonization; cultures since 07/2017 have been negative. Will treat empirically with ceftriaxone 1g x2 days (received cefepime x1 in Ed)  - 2L NS given in ED, 125mL/hr NS started for fluid maintenance since pt says she has had increased ostomy output for approx 1 year and is frequently dehydrated / decreased PO intake recently  - Other sources include pt's port-a-cath, which has been in place since 02/2017; however denies pain, purulence, or discomfort at site of insertion        Other chest pain    EKG negative for ST changes; however, pt says she does have palpitations frequently. States that she doesn't get chest pain / SOB / light-headedness, but she did have palpitations during this and the previous chest pain episode.  - Pt's maternal grandfather had an MI at age 35, as well as CHF, dx unknown  -- Will get repeat EKG, tele, refer to cards outpatient  - Trop  negative        Current chronic use of systemic steroids    Pt says she takes 5 mg prednisone QOD, will continue  - Given pt's long-term steroid use will get random cortisol, if not adequately elevated can add hydrocortisone 100 TID i.e. stress dose steroids  - Pt may have relative adrenal insufficiency in the setting of long term steroid and pt's chronically dehydrated state (per pt), may be contributing to tachycardia        Lactic acid acidosis    Repeat lactate normal after 2L NS, likely sepsis + dehydration        Granulomatous colitis    Previously on Stelara, stopped 01/24 at Dr. Ross's office appointment (gastroenterologist with whom she follows) due to rash  - Started on prednisone 10 daily per note but pt says she takes 5 QOD, will continue          VTE Risk Mitigation         Ordered     Medium Risk of VTE  Once      02/03/18 0240             Shila Sanz MD  Department of Hospital Medicine   [facility]

## 2018-02-03 NOTE — ED NOTES
Pt reports she is no longer itching after benadryl administration, still denies any change in respiratory status.

## 2018-02-03 NOTE — SUBJECTIVE & OBJECTIVE
Past Medical History:   Diagnosis Date    Abnormal Pap smear 2007    Abnormal Pap smear 5/26/2011    Anemia     Anxiety     Arthritis     C. difficile diarrhea     Crohn's disease     Depression 8/5/2017    Encounter for blood transfusion     Genital HSV     History of colposcopy with cervical biopsy 2007 and 7/2011 2007-LYLA I  and 7/2011- LYLA I    Hypertension     Kidney stone     Kidney stone     Recurrent UTI 4/3/2013    S/P ileostomy 7/9/2012    Sterilization 6/23/2012       Past Surgical History:   Procedure Laterality Date    ABDOMINAL SURGERY      APPENDECTOMY      BLADDER SURGERY      partial cystectomy due to fistula    CKC      COLON SURGERY      COLONOSCOPY      HYSTERECTOMY  04/16/2015    SHELLIE    ILEOSTOMY      OOPHORECTOMY Right 04/16/2015    PORTACATH PLACEMENT      SKIN BIOPSY      SMALL INTESTINE SURGERY      TOTAL COLECTOMY      TUBAL LIGATION  06 06 2012    UPPER GASTROINTESTINAL ENDOSCOPY         Review of patient's allergies indicates:   Allergen Reactions    Vancomycin analogues Hives    Azathioprine sodium      Other reaction(s): pancreatitis  Other reaction(s): pancreatitis    Methotrexate      Other reaction(s): infection-    Morphine Itching and Other (See Comments)     Other reaction(s): Itching       No current facility-administered medications on file prior to encounter.      Current Outpatient Prescriptions on File Prior to Encounter   Medication Sig    acetaminophen (TYLENOL) 650 MG TbSR Take 650 mg by mouth daily as needed (fever/pain).    ALPRAZolam (XANAX) 0.5 MG tablet TAKE 1 TABLET BY MOUTH TWICE A DAY    citalopram (CELEXA) 20 MG tablet Take 1 tablet (20 mg total) by mouth once daily.    diphenoxylate-atropine 2.5-0.025 mg (LOMOTIL) 2.5-0.025 mg per tablet TAKE 1 TABLET BY MOUTH 3 TIMES A DAY AS NEEDED FOR DIARRHEA    dronabinol (MARINOL) 2.5 MG capsule Take 1 capsule (2.5 mg total) by mouth 2 (two) times daily before meals.    IBUPROFEN  ORAL Take 400-600 mg by mouth daily as needed (fever/pain).    lisinopril 10 MG tablet TAKE 1 TABLET (10 MG TOTAL) BY MOUTH ONCE DAILY.    loperamide (IMODIUM) 2 mg capsule Take 2 mg by mouth daily as needed for Diarrhea.    metroNIDAZOLE (FLAGYL) 500 MG tablet TAKE 1 TABLET (500 MG TOTAL) BY MOUTH 3 (THREE) TIMES DAILY.    ondansetron (ZOFRAN-ODT) 8 MG TbDL TAKE 1 TABLET (8 MG TOTAL) BY MOUTH EVERY 8 (EIGHT) HOURS AS NEEDED (NAUSEA).    oxyCODONE-acetaminophen (PERCOCET)  mg per tablet Take 1 tablet by mouth every 6 (six) hours as needed for Pain.    potassium citrate 15 mEq TbSR Take 2 tablets by mouth 2 (two) times daily.    promethazine (PHENERGAN) 25 MG tablet TAKE 1 TABLET BY MOUTH EVERY 6 HOURS AS NEEDED FOR NAUSEA    sumatriptan (IMITREX) 100 MG tablet TAKE 1 TABLET BY MOUTH EVERY 2 HOURS AS NEEDED FOR MIGRAINE. DO NOT EXCEED 2 DOSES/ 24HRS    tacrolimus (PROTOPIC) 0.1 % ointment Apply 1 application topically 2 (two) times daily as needed (for rash on face).     zolpidem (AMBIEN) 10 mg Tab TAKE 1 TABLET (10 MG TOTAL) BY MOUTH EVERY EVENING.     Family History     Problem Relation (Age of Onset)    Cancer Father, Maternal Grandfather    Colon cancer Father    Crohn's disease Brother    Endometrial cancer Maternal Aunt    Hypertension Mother    Skin cancer Maternal Grandfather        Social History Main Topics    Smoking status: Never Smoker    Smokeless tobacco: Never Used    Alcohol use 0.0 oz/week      Comment: Patient only drinks wine not regular basis.    Drug use: No    Sexual activity: Yes     Partners: Male     Birth control/ protection: Pill, Surgical, Condom      Comment: HYST     Review of Systems   Constitutional: Positive for activity change and diaphoresis. Negative for fatigue and fever.   Respiratory: Positive for chest tightness and shortness of breath. Negative for cough, wheezing and stridor.    Cardiovascular: Positive for chest pain and palpitations. Negative for leg  swelling.   Gastrointestinal: Positive for nausea. Negative for abdominal distention, abdominal pain, constipation, diarrhea and vomiting.   Genitourinary: Negative for dysuria, flank pain, frequency and hematuria.   Musculoskeletal: Negative for arthralgias, back pain, gait problem, joint swelling, myalgias, neck pain and neck stiffness.   Neurological: Negative for seizures, syncope, facial asymmetry, light-headedness and headaches.     Objective:     Vital Signs (Most Recent):  Temp: 99.1 °F (37.3 °C) (02/03/18 0228)  Pulse: 90 (02/03/18 0221)  Resp: 18 (02/03/18 0201)  BP: 117/83 (02/03/18 0221)  SpO2: 98 % (02/03/18 0221) Vital Signs (24h Range):  Temp:  [99 °F (37.2 °C)-99.1 °F (37.3 °C)] 99.1 °F (37.3 °C)  Pulse:  [] 90  Resp:  [14-85] 18  SpO2:  [97 %-100 %] 98 %  BP: (113-149)/(69-95) 117/83     Weight: 45.4 kg (100 lb)  Body mass index is 18.89 kg/m².    Physical Exam   Constitutional: She appears well-developed.   HENT:   Head: Normocephalic and atraumatic.   Right Ear: External ear normal.   Left Ear: External ear normal.   Nose: Nose normal.   Eyes: EOM are normal. Pupils are equal, round, and reactive to light.   Neck: No tracheal deviation present. No thyromegaly present.   Cardiovascular: Normal rate.    No murmur heard.  Pulmonary/Chest: Effort normal and breath sounds normal.   Abdominal: Soft. There is no tenderness. No hernia.   Musculoskeletal: She exhibits no edema.   Neurological: She displays normal reflexes. No cranial nerve deficit.   Skin: No rash noted.   Psychiatric: She has a normal mood and affect. Judgment normal.   Vitals reviewed.        CRANIAL NERVES     CN III, IV, VI   Pupils are equal, round, and reactive to light.  Extraocular motions are normal.        Significant Labs:   CBC:   Recent Labs  Lab 02/02/18 2111   WBC 9.56   HGB 13.7   HCT 40.2   *     CMP:   Recent Labs  Lab 02/02/18 2111      K 3.5      CO2 23   *   BUN 7   CREATININE 0.8    CALCIUM 9.4   PROT 8.6*   ALBUMIN 3.8   BILITOT 0.3   ALKPHOS 62   AST 33   ALT 18   ANIONGAP 13   EGFRNONAA >60.0     Lactic Acid:   Recent Labs  Lab 02/02/18 2111 02/03/18  0030   LACTATE 4.9* 0.7     Troponin:   Recent Labs  Lab 02/02/18 2111   TROPONINI <0.006     Urine Studies:   Recent Labs  Lab 02/02/18  2258   COLORU Yellow   APPEARANCEUA Hazy*   PHUR 5.0   SPECGRAV 1.020   PROTEINUA Negative   GLUCUA Negative   KETONESU Negative   BILIRUBINUA Negative   OCCULTUA Negative   NITRITE Positive*   UROBILINOGEN Negative   LEUKOCYTESUR 2+*   RBCUA 2   WBCUA >100*   BACTERIA Many*   SQUAMEPITHEL 1       Significant Imaging: I have reviewed all pertinent imaging results/findings within the past 24 hours.

## 2018-02-03 NOTE — ASSESSMENT & PLAN NOTE
Previously on Stelara, stopped 01/24 at Dr. Ross's office appointment (gastroenterologist with whom she follows) due to rash  - Started on prednisone 10 daily per note but pt says she takes 5 QOD, will continue

## 2018-02-03 NOTE — ASSESSMENT & PLAN NOTE
-initial lactate elevated, normalized s/p IVF challenge. Patient has known prior ESBL UTI- UA shows likely infection this admission. Recommend empiric sepsis treatment- consider ESBL gram neg coverage   -vitals stable for floor at time of consultation, discussed with MD Amy ICU fellow.

## 2018-02-03 NOTE — ED NOTES
Pt has developed wide spread rash to face and extremities with itching, denies SOB or any change in respiratory status. Vancomycin stopped and Dr. Doherty notified, ordering IV benedryl STAT. Pt reports no previous allergy to Vancomycin but says she has began having new allergies since starting Stelara rx.

## 2018-02-03 NOTE — ASSESSMENT & PLAN NOTE
Presented with chest pain, SOB, tachcyardia that improved with IVF  - UA shows >100 WBC + bacteria + nitrites + leukocyte esterase, despite being asymptomatic inflammatory response to bacteria suggests infection over bacteriuria   - Pt has hx ESBL 07/2017 but does not have chronic indwelling huffman or other factors that would cause colonization; cultures since 07/2017 have been negative. Will treat empirically with ceftriaxone 1g x2 days (received cefepime x1 in Ed)  - 2L NS given in ED, 125mL/hr NS started for fluid maintenance since pt says she has had increased ostomy output for approx 1 year and is frequently dehydrated / decreased PO intake recently  - Other sources include pt's port-a-cath, which has been in place since 02/2017; however denies pain, purulence, or discomfort at site of insertion

## 2018-02-03 NOTE — CONSULTS
[facility]  Critical Care Medicine  Consult    Patient Name: Ivy Salgado  MRN: [id number]  Admission Date: 2/2/2018  Hospital Length of Stay: 0 days  Code Status: Prior  Attending Provider: Salvatore Mathew MD  Primary Care Provider: Kalia Astorga MD   Principal Problem: <principal problem not specified>    Subjective:     HPI:  Ms Salgado is a 36 yo F with Chrons IBD s/p ileostomy, ESBL UTI, previously on biologic therapy, prednisone, HTN who presents the ED via EMS for complaints of pleuritic chest pain occurring intermittently since 8:30 PM. She states it was substernal and in the epigastrium and resolved without intervention. She reports recent workup for pulmonary embolus with similar symptoms, and workup was negative to date. She reports palpitations and nausea, and diarrhea per Crohns baseline. Her ostomy site is relatively unchanged for baseline, and patient reports a facial erythematous rash today that was recently seen by Dermatology, which she describes as an AE to previous biologic therapy. In the ED, blood pressure was stable,patient was tachycardic with initial lactate of 4, which improved with 2L IVF. She denies fever, chills, cough, chills and UA was nitrite/ LE positive.     Hospital/ICU Course:  No notes on file    Past Medical History:   Diagnosis Date    Abnormal Pap smear 2007    Abnormal Pap smear 5/26/2011    Anemia     Anxiety     Arthritis     C. difficile diarrhea     Crohn's disease     Depression 8/5/2017    Encounter for blood transfusion     Genital HSV     History of colposcopy with cervical biopsy 2007 and 7/2011 2007-LYLA I  and 7/2011- LYLA I    Hypertension     Kidney stone     Kidney stone     Recurrent UTI 4/3/2013    S/P ileostomy 7/9/2012    Sterilization 6/23/2012       Past Surgical History:   Procedure Laterality Date    ABDOMINAL SURGERY      APPENDECTOMY      BLADDER SURGERY      partial cystectomy due to fistula    [facility]       COLON SURGERY      COLONOSCOPY      HYSTERECTOMY  04/16/2015    SHELLIE    ILEOSTOMY      OOPHORECTOMY Right 04/16/2015    PORTACATH PLACEMENT      SKIN BIOPSY      SMALL INTESTINE SURGERY      TOTAL COLECTOMY      TUBAL LIGATION  06 06 2012    UPPER GASTROINTESTINAL ENDOSCOPY         Review of patient's allergies indicates:   Allergen Reactions    Vancomycin analogues Hives    Azathioprine sodium      Other reaction(s): pancreatitis  Other reaction(s): pancreatitis    Methotrexate      Other reaction(s): infection-    Morphine Itching and Other (See Comments)     Other reaction(s): Itching       Family History     Problem Relation (Age of Onset)    Cancer Father, Maternal Grandfather    Colon cancer Father    Crohn's disease Brother    Endometrial cancer Maternal Aunt    Hypertension Mother    Skin cancer Maternal Grandfather        Social History Main Topics    Smoking status: Never Smoker    Smokeless tobacco: Never Used    Alcohol use 0.0 oz/week      Comment: Patient only drinks wine not regular basis.    Drug use: No    Sexual activity: Yes     Partners: Male     Birth control/ protection: Pill, Surgical, Condom      Comment: HYST      Review of Systems   Constitutional: Positive for appetite change. Negative for chills, diaphoresis, fatigue, fever and unexpected weight change.   HENT: Negative for rhinorrhea, sinus pressure, sore throat and tinnitus.    Eyes: Negative for photophobia and visual disturbance.   Respiratory: Negative for apnea, cough, choking, chest tightness, shortness of breath and stridor.    Cardiovascular: Negative for chest pain, palpitations and leg swelling.   Gastrointestinal: Positive for diarrhea and nausea. Negative for abdominal distention, abdominal pain, constipation, rectal pain and vomiting.   Genitourinary: Positive for frequency. Negative for dysuria, flank pain, menstrual problem, urgency, vaginal bleeding and vaginal discharge.   Musculoskeletal: Negative  for arthralgias, gait problem, joint swelling, neck pain and neck stiffness.   Skin: Positive for rash. Negative for color change and wound.   Allergic/Immunologic: Negative for immunocompromised state.   Neurological: Negative for dizziness, speech difficulty, light-headedness and headaches.   Hematological: Negative for adenopathy.   Psychiatric/Behavioral: Negative for agitation.     Objective:     Vital Signs (Most Recent):  Temp: 99 °F (37.2 °C) (02/02/18 2216)  Pulse: 106 (02/03/18 0022)  Resp: 18 (02/03/18 0022)  BP: 126/73 (02/03/18 0022)  SpO2: 99 % (02/03/18 0022) Vital Signs (24h Range):  Temp:  [99 °F (37.2 °C)] 99 °F (37.2 °C)  Pulse:  [] 106  Resp:  [14-85] 18  SpO2:  [99 %-100 %] 99 %  BP: (126-149)/(73-95) 126/73   Weight: 45.4 kg (100 lb)  Body mass index is 18.89 kg/m².      Intake/Output Summary (Last 24 hours) at 02/03/18 0055  Last data filed at 02/03/18 0027   Gross per 24 hour   Intake             2300 ml   Output                0 ml   Net             2300 ml       Physical Exam   Constitutional: She is oriented to person, place, and time. No distress.   HENT:   Mouth/Throat: No oropharyngeal exudate.   Eyes: EOM are normal. Pupils are equal, round, and reactive to light. Right eye exhibits no discharge. Left eye exhibits no discharge. No scleral icterus.   Neck: Normal range of motion. No JVD present. No tracheal deviation present.   Cardiovascular: Regular rhythm, normal heart sounds and intact distal pulses.  Exam reveals no gallop and no friction rub.    No murmur heard.  Rate tachy   Pulmonary/Chest: Effort normal and breath sounds normal. No respiratory distress. She has no wheezes. She has no rales. She exhibits no tenderness.   Abdominal: Soft. Bowel sounds are normal. She exhibits no distension. There is no tenderness. There is no rebound and no guarding.   Ostomy site clean, no purulence    Musculoskeletal: Normal range of motion. She exhibits no edema, tenderness or deformity.    Neurological: She is alert and oriented to person, place, and time. She displays normal reflexes. No cranial nerve deficit or sensory deficit.   Skin: Skin is warm and dry. Capillary refill takes less than 2 seconds. Rash noted. She is not diaphoretic. No erythema. No pallor.   Facial erythema    Psychiatric: She has a normal mood and affect.       Vents:     Lines/Drains/Airways     Drain                 Ileostomy RUQ -- days          Peripheral Intravenous Line                 Peripheral IV - Single Lumen 02/02/18 2111 Left Forearm less than 1 day              Significant Labs:    CBC/Anemia Profile:    Recent Labs  Lab 02/02/18 2111   WBC 9.56   HGB 13.7   HCT 40.2   *   MCV 91   RDW 13.6        Chemistries:    Recent Labs  Lab 02/02/18 2111      K 3.5      CO2 23   BUN 7   CREATININE 0.8   CALCIUM 9.4   ALBUMIN 3.8   PROT 8.6*   BILITOT 0.3   ALKPHOS 62   ALT 18   AST 33   MG 2.2       A1C: No results for input(s): HGBA1C in the last 48 hours.  ABGs:   Recent Labs  Lab 02/02/18  2339   PH 7.358   PCO2 40.1   HCO3 22.5*   POCSATURATED 83*   BE -3     Bilirubin:   Recent Labs  Lab 02/02/18 2111   BILITOT 0.3     Blood Culture: No results for input(s): LABBLOO in the last 48 hours.  BMP:   Recent Labs  Lab 02/02/18 2111   *      K 3.5      CO2 23   BUN 7   CREATININE 0.8   CALCIUM 9.4   MG 2.2     CMP:   Recent Labs  Lab 02/02/18 2111      K 3.5      CO2 23   *   BUN 7   CREATININE 0.8   CALCIUM 9.4   PROT 8.6*   ALBUMIN 3.8   BILITOT 0.3   ALKPHOS 62   AST 33   ALT 18   ANIONGAP 13   EGFRNONAA >60.0     Cardiac Markers: No results for input(s): CKMB, TROPONINT, MYOGLOBIN in the last 48 hours.  Coagulation:   Recent Labs  Lab 02/02/18 2111   INR 1.1     Lactic Acid:   Recent Labs  Lab 02/02/18 2111   LACTATE 4.9*     Lipid Panel: No results for input(s): CHOL, HDL, LDLCALC, TRIG, CHOLHDL in the last 48 hours.  POCT Glucose: No results for input(s):  POCTGLUCOSE in the last 48 hours.  Prealbumin: No results for input(s): PREALBUMIN in the last 48 hours.  Respiratory Culture: No results for input(s): GSRESP, RESPIRATORYC in the last 48 hours.  Troponin:   Recent Labs  Lab 02/02/18  2111   TROPONINI <0.006     Urine Culture: No results for input(s): LABURIN in the last 48 hours.  Urine Studies:   Recent Labs  Lab 02/02/18  2258   COLORU Yellow   APPEARANCEUA Hazy*   PHUR 5.0   SPECGRAV 1.020   PROTEINUA Negative   GLUCUA Negative   KETONESU Negative   BILIRUBINUA Negative   OCCULTUA Negative   NITRITE Positive*   UROBILINOGEN Negative   LEUKOCYTESUR 2+*   RBCUA 2   WBCUA >100*   BACTERIA Many*   SQUAMEPITHEL 1     All pertinent labs within the past 24 hours have been reviewed.    Significant Imaging: I have reviewed and interpreted all pertinent imaging results/findings within the past 24 hours.    Assessment/Plan:     Renal/   Lactic acid acidosis    -initial lactate elevated, normalized s/p IVF challenge. Patient has known prior ESBL UTI- UA shows likely infection this admission. Recommend empiric sepsis treatment- consider ESBL gram neg coverage   -vitals stable for floor at time of consultation, discussed with MD Amy ICU fellow.                Critical care was time spent personally by me on the following activities: development of treatment plan with patient or surrogate and bedside caregivers, discussions with consultants, evaluation of patient's response to treatment, examination of patient, ordering and performing treatments and interventions, ordering and review of laboratory studies, ordering and review of radiographic studies, pulse oximetry, re-evaluation of patient's condition. This critical care time did not overlap with that of any other provider or involve time for any procedures.     Nicolas Woodward MD  Critical Care Medicine  [facility]

## 2018-02-03 NOTE — HPI
"37F with Crohn's disease (diagnosed at age 10) s/p TAC + ileostomy in 2012 on 5mg pred QOD recently stopped stelara, anxiety, HTN who presents to the ED for chest pain and SOB. Pt says that she was sitting on her couch watching TV when she suddenly developed chest pain, shortness of breath, nausea, and palpitations. She called 911 because she felt like she "was going to pass out." She says that this is identical to her episode 1 week prior during which she was worked up for PE (CTA negative).     In the ED pt was given 2L NS and critical care was consulted for pt's initial lactate of 4.9, which decreased to 0.7. UA showed bacteria, >100 WBCs, nitrites, and leukocyte esterase; pt denied dysuria, hematuria, or increase urinary frequency. Pt says she felt better after fluids and was given cefepime x1 in the ED for empiric coverage of UTI. Pt has had ESBL in the past (last positive culture 07/2017) but subsequent urine cultures were negative and pt does not have any indwelling catheter, stents, or nephrostomy tubes. Denies syncope, headache, vision changes. Denies recent fever, chills, n/v, diarrhea/constipation (however, pt says that she has had increased ostomy output and been dehydrated for approx 1 year, when her last CD flare began).    Pt was admitted to Hospital Medicine for further monitoring and treatment of urosepsis.  "

## 2018-02-03 NOTE — ASSESSMENT & PLAN NOTE
EKG negative for ST changes; however, pt says she does have palpitations frequently. States that she doesn't get chest pain / SOB / light-headedness, but she did have palpitations during this and the previous chest pain episode.  - Pt's maternal grandfather had an MI at age 35, as well as CHF, dx unknown  -- Will get repeat EKG, tele, refer to cards outpatient  - Trop negative

## 2018-02-04 PROBLEM — E87.20 LACTIC ACID ACIDOSIS: Status: RESOLVED | Noted: 2018-02-02 | Resolved: 2018-02-04

## 2018-02-04 LAB
25(OH)D3+25(OH)D2 SERPL-MCNC: 16 NG/ML
ALBUMIN SERPL BCP-MCNC: 2.8 G/DL
ALP SERPL-CCNC: 50 U/L
ALT SERPL W/O P-5'-P-CCNC: 12 U/L
ANION GAP SERPL CALC-SCNC: 6 MMOL/L
AST SERPL-CCNC: 14 U/L
BASOPHILS # BLD AUTO: 0.01 K/UL
BASOPHILS NFR BLD: 0.1 %
BILIRUB SERPL-MCNC: 0.1 MG/DL
BUN SERPL-MCNC: 6 MG/DL
CALCIUM SERPL-MCNC: 8.2 MG/DL
CHLORIDE SERPL-SCNC: 107 MMOL/L
CO2 SERPL-SCNC: 26 MMOL/L
CREAT SERPL-MCNC: 0.6 MG/DL
DIASTOLIC DYSFUNCTION: NO
DIFFERENTIAL METHOD: ABNORMAL
EOSINOPHIL # BLD AUTO: 0 K/UL
EOSINOPHIL NFR BLD: 0 %
ERYTHROCYTE [DISTWIDTH] IN BLOOD BY AUTOMATED COUNT: 14.1 %
EST. GFR  (AFRICAN AMERICAN): >60 ML/MIN/1.73 M^2
EST. GFR  (NON AFRICAN AMERICAN): >60 ML/MIN/1.73 M^2
ESTIMATED PA SYSTOLIC PRESSURE: 15.67
GLUCOSE SERPL-MCNC: 123 MG/DL
HCT VFR BLD AUTO: 31.3 %
HGB BLD-MCNC: 10.6 G/DL
IMM GRANULOCYTES # BLD AUTO: 0.02 K/UL
IMM GRANULOCYTES NFR BLD AUTO: 0.2 %
LYMPHOCYTES # BLD AUTO: 1.5 K/UL
LYMPHOCYTES NFR BLD: 16.6 %
MAGNESIUM SERPL-MCNC: 2 MG/DL
MCH RBC QN AUTO: 31.1 PG
MCHC RBC AUTO-ENTMCNC: 33.9 G/DL
MCV RBC AUTO: 92 FL
MITRAL VALVE REGURGITATION: NORMAL
MONOCYTES # BLD AUTO: 0.7 K/UL
MONOCYTES NFR BLD: 7.1 %
NEUTROPHILS # BLD AUTO: 7 K/UL
NEUTROPHILS NFR BLD: 76 %
NRBC BLD-RTO: 0 /100 WBC
PHOSPHATE SERPL-MCNC: 3.1 MG/DL
PLATELET # BLD AUTO: 292 K/UL
PMV BLD AUTO: 10.1 FL
POTASSIUM SERPL-SCNC: 3.7 MMOL/L
PROT SERPL-MCNC: 5.9 G/DL
RBC # BLD AUTO: 3.41 M/UL
RETIRED EF AND QEF - SEE NOTES: 65 (ref 55–65)
SODIUM SERPL-SCNC: 139 MMOL/L
TRICUSPID VALVE REGURGITATION: NORMAL
WBC # BLD AUTO: 9.15 K/UL

## 2018-02-04 PROCEDURE — 25000003 PHARM REV CODE 250: Performed by: STUDENT IN AN ORGANIZED HEALTH CARE EDUCATION/TRAINING PROGRAM

## 2018-02-04 PROCEDURE — 83735 ASSAY OF MAGNESIUM: CPT

## 2018-02-04 PROCEDURE — 85025 COMPLETE CBC W/AUTO DIFF WBC: CPT

## 2018-02-04 PROCEDURE — 82306 VITAMIN D 25 HYDROXY: CPT

## 2018-02-04 PROCEDURE — 93010 ELECTROCARDIOGRAM REPORT: CPT | Mod: ,,, | Performed by: INTERNAL MEDICINE

## 2018-02-04 PROCEDURE — 99232 SBSQ HOSP IP/OBS MODERATE 35: CPT | Mod: ,,, | Performed by: HOSPITALIST

## 2018-02-04 PROCEDURE — 11000001 HC ACUTE MED/SURG PRIVATE ROOM

## 2018-02-04 PROCEDURE — 80053 COMPREHEN METABOLIC PANEL: CPT

## 2018-02-04 PROCEDURE — 63600175 PHARM REV CODE 636 W HCPCS: Performed by: INTERNAL MEDICINE

## 2018-02-04 PROCEDURE — 63600175 PHARM REV CODE 636 W HCPCS: Performed by: STUDENT IN AN ORGANIZED HEALTH CARE EDUCATION/TRAINING PROGRAM

## 2018-02-04 PROCEDURE — 93005 ELECTROCARDIOGRAM TRACING: CPT

## 2018-02-04 PROCEDURE — 84100 ASSAY OF PHOSPHORUS: CPT

## 2018-02-04 RX ORDER — SODIUM CHLORIDE, SODIUM LACTATE, POTASSIUM CHLORIDE, CALCIUM CHLORIDE 600; 310; 30; 20 MG/100ML; MG/100ML; MG/100ML; MG/100ML
INJECTION, SOLUTION INTRAVENOUS CONTINUOUS
Status: ACTIVE | OUTPATIENT
Start: 2018-02-04 | End: 2018-02-05

## 2018-02-04 RX ORDER — HYDROXYZINE HYDROCHLORIDE 25 MG/1
50 TABLET, FILM COATED ORAL ONCE
Status: DISCONTINUED | OUTPATIENT
Start: 2018-02-04 | End: 2018-02-04

## 2018-02-04 RX ORDER — HYDROXYZINE HYDROCHLORIDE 25 MG/1
50 TABLET, FILM COATED ORAL ONCE
Status: COMPLETED | OUTPATIENT
Start: 2018-02-04 | End: 2018-02-04

## 2018-02-04 RX ADMIN — LISINOPRIL 10 MG: 10 TABLET ORAL at 08:02

## 2018-02-04 RX ADMIN — OXYCODONE HYDROCHLORIDE AND ACETAMINOPHEN 1 TABLET: 10; 325 TABLET ORAL at 03:02

## 2018-02-04 RX ADMIN — DRONABINOL 2.5 MG: 2.5 CAPSULE ORAL at 12:02

## 2018-02-04 RX ADMIN — HYDROCORTISONE SODIUM SUCCINATE 100 MG: 100 INJECTION, POWDER, FOR SOLUTION INTRAMUSCULAR; INTRAVENOUS at 05:02

## 2018-02-04 RX ADMIN — ZOLPIDEM TARTRATE 10 MG: 5 TABLET ORAL at 10:02

## 2018-02-04 RX ADMIN — OXYCODONE HYDROCHLORIDE AND ACETAMINOPHEN 1 TABLET: 10; 325 TABLET ORAL at 10:02

## 2018-02-04 RX ADMIN — PREDNISONE 10 MG: 10 TABLET ORAL at 08:02

## 2018-02-04 RX ADMIN — HYDROCORTISONE SODIUM SUCCINATE 100 MG: 100 INJECTION, POWDER, FOR SOLUTION INTRAMUSCULAR; INTRAVENOUS at 01:02

## 2018-02-04 RX ADMIN — ONDANSETRON 8 MG: 4 TABLET, ORALLY DISINTEGRATING ORAL at 10:02

## 2018-02-04 RX ADMIN — CALCIUM 500 MG: 500 TABLET ORAL at 08:02

## 2018-02-04 RX ADMIN — HYDROXYZINE HYDROCHLORIDE 50 MG: 25 TABLET, FILM COATED ORAL at 01:02

## 2018-02-04 RX ADMIN — CALCIUM 500 MG: 500 TABLET ORAL at 05:02

## 2018-02-04 RX ADMIN — ALPRAZOLAM 0.5 MG: 0.5 TABLET ORAL at 10:02

## 2018-02-04 RX ADMIN — HYDROCORTISONE SODIUM SUCCINATE 100 MG: 100 INJECTION, POWDER, FOR SOLUTION INTRAMUSCULAR; INTRAVENOUS at 10:02

## 2018-02-04 RX ADMIN — CITALOPRAM HYDROBROMIDE 20 MG: 20 TABLET ORAL at 08:02

## 2018-02-04 RX ADMIN — DRONABINOL 2.5 MG: 2.5 CAPSULE ORAL at 05:02

## 2018-02-04 RX ADMIN — OXYCODONE HYDROCHLORIDE AND ACETAMINOPHEN 1 TABLET: 10; 325 TABLET ORAL at 04:02

## 2018-02-04 RX ADMIN — DRONABINOL 2.5 MG: 2.5 CAPSULE ORAL at 08:02

## 2018-02-04 RX ADMIN — SODIUM CHLORIDE, SODIUM LACTATE, POTASSIUM CHLORIDE, AND CALCIUM CHLORIDE: .6; .31; .03; .02 INJECTION, SOLUTION INTRAVENOUS at 10:02

## 2018-02-04 RX ADMIN — CEFTRIAXONE SODIUM 1 G: 1 INJECTION, POWDER, FOR SOLUTION INTRAMUSCULAR; INTRAVENOUS at 10:02

## 2018-02-04 RX ADMIN — PROMETHAZINE HYDROCHLORIDE 25 MG: 25 TABLET ORAL at 03:02

## 2018-02-04 NOTE — PROGRESS NOTES
Called to room by patient stating she was having chest pain and he legs were shaking. VSS. Paged Dr. Wright with IM3 who stated he will come see the patient New orders received for stat EKG and one dose of hydroxyzine. Will continue to monitor pt.

## 2018-02-04 NOTE — PROGRESS NOTES
"[facility] Hospital Medicine  Progress Note    Patient Name: Ivy Salgado  MRN: [id number]  Patient Class: IP- Inpatient   Admission Date: 2/2/2018  Length of Stay: 1 days  Attending Physician: Flaquita Miller MD  Primary Care Provider: Kalia Astorga MD    [place] [facility] Medicine Team: [facility] HOSP MED 3 Lb Wright MD    Subjective:     Principal Problem:Sepsis secondary to UTI    HPI:  37F with Crohn's disease (diagnosed at age 10) s/p TAC + ileostomy in 2012 on 5mg pred QOD recently stopped stelara, anxiety, HTN who presents to the ED for chest pain and SOB. Pt says that she was sitting on her couch watching TV when she suddenly developed chest pain, shortness of breath, nausea, and palpitations. She called 911 because she felt like she "was going to pass out." She says that this is identical to her episode 1 week prior during which she was worked up for PE (CTA negative).     In the ED pt was given 2L NS and critical care was consulted for pt's initial lactate of 4.9, which decreased to 0.7. UA showed bacteria, >100 WBCs, nitrites, and leukocyte esterase; pt denied dysuria, hematuria, or increase urinary frequency. Pt says she felt better after fluids and was given cefepime x1 in the ED for empiric coverage of UTI. Pt has had ESBL in the past (last positive culture 07/2017) but subsequent urine cultures were negative and pt does not have any indwelling catheter, stents, or nephrostomy tubes. Denies syncope, headache, vision changes. Denies recent fever, chills, n/v, diarrhea/constipation (however, pt says that she has had increased ostomy output and been dehydrated for approx 1 year, when her last CD flare began).    Pt was admitted to Hospital Medicine for further monitoring and treatment of urosepsis.    Hospital Course:  No notes on file    Interval History: Yesterday evening one episode of palpitations, shaking. EKG showed sinus tach with nonspecific T wave abnormalities. This morning, " she reported doing well and otherwise had no complaints. Plan: continue antibiotics for GNR in urine culture.    Review of Systems   Constitutional: Positive for activity change and diaphoresis. Negative for fatigue and fever.   Respiratory: Positive for chest tightness and shortness of breath. Negative for cough, wheezing and stridor.    Cardiovascular: Positive for chest pain and palpitations. Negative for leg swelling.   Gastrointestinal: Positive for nausea. Negative for abdominal distention, abdominal pain, constipation, diarrhea and vomiting.   Genitourinary: Negative for dysuria, flank pain, frequency and hematuria.   Musculoskeletal: Negative for arthralgias, back pain, gait problem, joint swelling, myalgias, neck pain and neck stiffness.   Neurological: Negative for seizures, syncope, facial asymmetry, light-headedness and headaches.     Objective:     Vital Signs (Most Recent):  Temp: 98.9 °F (37.2 °C) (02/04/18 1153)  Pulse: 82 (02/04/18 1153)  Resp: 20 (02/04/18 1153)  BP: 109/66 (02/04/18 1153)  SpO2: 98 % (02/04/18 1153) Vital Signs (24h Range):  Temp:  [98.5 °F (36.9 °C)-99.2 °F (37.3 °C)] 98.9 °F (37.2 °C)  Pulse:  [] 82  Resp:  [16-25] 20  SpO2:  [97 %-99 %] 98 %  BP: (109-135)/(66-87) 109/66     Weight: 48.7 kg (107 lb 6.4 oz)  Body mass index is 20.29 kg/m².    Intake/Output Summary (Last 24 hours) at 02/04/18 1409  Last data filed at 02/04/18 1200   Gross per 24 hour   Intake              525 ml   Output              650 ml   Net             -125 ml      Physical Exam   Constitutional: She appears well-developed.   HENT:   Head: Normocephalic and atraumatic.   Right Ear: External ear normal.   Left Ear: External ear normal.   Nose: Nose normal.   Eyes: EOM are normal. Pupils are equal, round, and reactive to light.   Neck: No tracheal deviation present. No thyromegaly present.   Cardiovascular: Normal rate.    No murmur heard.  Pulmonary/Chest: Effort normal and breath sounds normal.    Abdominal: Soft. There is no tenderness. No hernia.   Musculoskeletal: She exhibits no edema.   Neurological: She displays normal reflexes. No cranial nerve deficit.   Skin: No rash noted.   Psychiatric: She has a normal mood and affect. Judgment normal.   Vitals reviewed.      Significant Labs:   BMP:   Recent Labs  Lab 02/04/18  0554   *      K 3.7      CO2 26   BUN 6   CREATININE 0.6   CALCIUM 8.2*   MG 2.0     CBC:   Recent Labs  Lab 02/02/18 2111 02/03/18  0535 02/04/18  0554   WBC 9.56 11.73 9.15   HGB 13.7 10.9* 10.6*   HCT 40.2 33.1* 31.3*   * 277 292     CMP:   Recent Labs  Lab 02/02/18 2111 02/03/18  0535 02/04/18  0554    140 139   K 3.5 3.5 3.7    111* 107   CO2 23 23 26   * 85 123*   BUN 7 6 6   CREATININE 0.8 0.7 0.6   CALCIUM 9.4 7.6* 8.2*   PROT 8.6* 5.7* 5.9*   ALBUMIN 3.8 2.8* 2.8*   BILITOT 0.3 0.4 0.1   ALKPHOS 62 48* 50*   AST 33 16 14   ALT 18 13 12   ANIONGAP 13 6* 6*   EGFRNONAA >60.0 >60.0 >60.0     Urine Culture:   Recent Labs  Lab 02/02/18  2258   LABURIN GRAM NEGATIVE LULU, LACTOSE >100,000 cfu/mlIdentification and susceptibility pending       Significant Imaging: I have reviewed all pertinent imaging results/findings within the past 24 hours.    Assessment/Plan:      * Sepsis secondary to UTI    Presented with chest pain, SOB, tachcyardia that improved with IVF  - UA shows >100 WBC + bacteria + nitrites + leukocyte esterase, despite being asymptomatic inflammatory response to bacteria suggests infection over bacteriuria   - Pt has hx ESBL 07/2017 but does not have chronic indwelling huffman or other factors that would cause colonization; cultures since 07/2017 have been negative. Will treat empirically with ceftriaxone 1g x2 days (received cefepime x1 in Ed)  - 2L NS given in ED, 125mL/hr NS started for fluid maintenance since pt says she has had increased ostomy output for approx 1 year and is frequently dehydrated / decreased PO  intake recently  - Other sources include pt's port-a-cath, which has been in place since 02/2017; however denies pain, purulence, or discomfort at site of insertion  - Urine culture with GNR lactose . Continue ceftriaxone 1g q24.         Current chronic use of systemic steroids    - continue home prednisone  - Given pt's long-term steroid use will get random cortisol, if not adequately elevated can add hydrocortisone 100 TID i.e. stress dose steroids  - random cortisol <1; started stress dose steroids for duration of antibiotic therapy        Other chest pain    EKG negative for ST changes; however, pt says she does have palpitations frequently. States that she doesn't get chest pain / SOB / light-headedness, but she did have palpitations during this and the previous chest pain episode.  - Pt's maternal grandfather had an MI at age 35, as well as CHF, dx unknown  -- Will get repeat EKG, tele, refer to cards outpatient  - Trop negative  - echo read pending  - referral to cardiology outpatient        Granulomatous colitis    Previously on Stelara, stopped 01/24 at Dr. Ross's office appointment (gastroenterologist with whom she follows) due to rash  - Started on prednisone 10 daily per note but pt says she takes 5 QOD, will continue        Generalized anxiety disorder    - alprazolam 0.5 mg PO BID PRN           VTE Risk Mitigation         Ordered     Medium Risk of VTE  Once      02/03/18 0432              Lb Wright MD  Department of Hospital Medicine   [facility]

## 2018-02-04 NOTE — PLAN OF CARE
Problem: Fall Risk (Adult)  Goal: Absence of Falls  Patient will demonstrate the desired outcomes by discharge/transition of care.   Outcome: Ongoing (interventions implemented as appropriate)  Pt is free of falls and injuries per shift .     Problem: Patient Care Overview  Goal: Plan of Care Review  Outcome: Ongoing (interventions implemented as appropriate)  Plan of care reviewed with pt and family . Pt is A,A,O x 4 , stable V/S. Pt C/O pain and nausea and PRN medications given as needed . Pt on continuous IVF  LR @ 125 cc/hr . Pt is able to ambulate  in the room independently . Pt C/O burning and tightness in the chest and MD notified , EKG ordered . Will keep monitoring .

## 2018-02-04 NOTE — ASSESSMENT & PLAN NOTE
Presented with chest pain, SOB, tachcyardia that improved with IVF  - UA shows >100 WBC + bacteria + nitrites + leukocyte esterase, despite being asymptomatic inflammatory response to bacteria suggests infection over bacteriuria   - Pt has hx ESBL 07/2017 but does not have chronic indwelling huffman or other factors that would cause colonization; cultures since 07/2017 have been negative. Will treat empirically with ceftriaxone 1g x2 days (received cefepime x1 in Ed)  - 2L NS given in ED, 125mL/hr NS started for fluid maintenance since pt says she has had increased ostomy output for approx 1 year and is frequently dehydrated / decreased PO intake recently  - Other sources include pt's port-a-cath, which has been in place since 02/2017; however denies pain, purulence, or discomfort at site of insertion  - Urine culture with GNR lactose . Continue ceftriaxone 1g q24.

## 2018-02-04 NOTE — PROGRESS NOTES
Pt reported she feels burning and tightness in the chest and it is not a heartburn . Pt is anxious and has shallow breathing  , IM 3  Notified and he ordered GI cocktail for the pt . Pt said she tried that before for the same thing and it did not help , pt request to speak with a DrBowen . MD notified and she said she will come and talk with the pt . Will keep monitoring .

## 2018-02-04 NOTE — SUBJECTIVE & OBJECTIVE
Interval History: Yesterday evening one episode of palpitations, shaking. EKG showed sinus tach with nonspecific T wave abnormalities. This morning, she reported doing well and otherwise had no complaints. Plan: continue antibiotics for GNR in urine culture.    Review of Systems   Constitutional: Positive for activity change and diaphoresis. Negative for fatigue and fever.   Respiratory: Positive for chest tightness and shortness of breath. Negative for cough, wheezing and stridor.    Cardiovascular: Positive for chest pain and palpitations. Negative for leg swelling.   Gastrointestinal: Positive for nausea. Negative for abdominal distention, abdominal pain, constipation, diarrhea and vomiting.   Genitourinary: Negative for dysuria, flank pain, frequency and hematuria.   Musculoskeletal: Negative for arthralgias, back pain, gait problem, joint swelling, myalgias, neck pain and neck stiffness.   Neurological: Negative for seizures, syncope, facial asymmetry, light-headedness and headaches.     Objective:     Vital Signs (Most Recent):  Temp: 98.9 °F (37.2 °C) (02/04/18 1153)  Pulse: 82 (02/04/18 1153)  Resp: 20 (02/04/18 1153)  BP: 109/66 (02/04/18 1153)  SpO2: 98 % (02/04/18 1153) Vital Signs (24h Range):  Temp:  [98.5 °F (36.9 °C)-99.2 °F (37.3 °C)] 98.9 °F (37.2 °C)  Pulse:  [] 82  Resp:  [16-25] 20  SpO2:  [97 %-99 %] 98 %  BP: (109-135)/(66-87) 109/66     Weight: 48.7 kg (107 lb 6.4 oz)  Body mass index is 20.29 kg/m².    Intake/Output Summary (Last 24 hours) at 02/04/18 1409  Last data filed at 02/04/18 1200   Gross per 24 hour   Intake              525 ml   Output              650 ml   Net             -125 ml      Physical Exam   Constitutional: She appears well-developed.   HENT:   Head: Normocephalic and atraumatic.   Right Ear: External ear normal.   Left Ear: External ear normal.   Nose: Nose normal.   Eyes: EOM are normal. Pupils are equal, round, and reactive to light.   Neck: No tracheal deviation  present. No thyromegaly present.   Cardiovascular: Normal rate.    No murmur heard.  Pulmonary/Chest: Effort normal and breath sounds normal.   Abdominal: Soft. There is no tenderness. No hernia.   Musculoskeletal: She exhibits no edema.   Neurological: She displays normal reflexes. No cranial nerve deficit.   Skin: No rash noted.   Psychiatric: She has a normal mood and affect. Judgment normal.   Vitals reviewed.      Significant Labs:   BMP:   Recent Labs  Lab 02/04/18  0554   *      K 3.7      CO2 26   BUN 6   CREATININE 0.6   CALCIUM 8.2*   MG 2.0     CBC:   Recent Labs  Lab 02/02/18 2111 02/03/18  0535 02/04/18  0554   WBC 9.56 11.73 9.15   HGB 13.7 10.9* 10.6*   HCT 40.2 33.1* 31.3*   * 277 292     CMP:   Recent Labs  Lab 02/02/18 2111 02/03/18  0535 02/04/18  0554    140 139   K 3.5 3.5 3.7    111* 107   CO2 23 23 26   * 85 123*   BUN 7 6 6   CREATININE 0.8 0.7 0.6   CALCIUM 9.4 7.6* 8.2*   PROT 8.6* 5.7* 5.9*   ALBUMIN 3.8 2.8* 2.8*   BILITOT 0.3 0.4 0.1   ALKPHOS 62 48* 50*   AST 33 16 14   ALT 18 13 12   ANIONGAP 13 6* 6*   EGFRNONAA >60.0 >60.0 >60.0     Urine Culture:   Recent Labs  Lab 02/02/18  0998   LABURIN GRAM NEGATIVE LULU, LACTOSE >100,000 cfu/mlIdentification and susceptibility pending       Significant Imaging: I have reviewed all pertinent imaging results/findings within the past 24 hours.

## 2018-02-04 NOTE — ASSESSMENT & PLAN NOTE
- continue home prednisone  - Given pt's long-term steroid use will get random cortisol, if not adequately elevated can add hydrocortisone 100 TID i.e. stress dose steroids  - random cortisol <1; started stress dose steroids for duration of antibiotic therapy

## 2018-02-04 NOTE — ASSESSMENT & PLAN NOTE
EKG negative for ST changes; however, pt says she does have palpitations frequently. States that she doesn't get chest pain / SOB / light-headedness, but she did have palpitations during this and the previous chest pain episode.  - Pt's maternal grandfather had an MI at age 35, as well as CHF, dx unknown  -- Will get repeat EKG, tele, refer to cards outpatient  - Trop negative  - referral to cardiology outpatient

## 2018-02-05 ENCOUNTER — TELEPHONE (OUTPATIENT)
Dept: HEPATOLOGY | Facility: HOSPITAL | Age: 38
End: 2018-02-05

## 2018-02-05 ENCOUNTER — TELEPHONE (OUTPATIENT)
Dept: INTERNAL MEDICINE | Facility: CLINIC | Age: 38
End: 2018-02-05

## 2018-02-05 ENCOUNTER — HOSPITAL ENCOUNTER (INPATIENT)
Facility: HOSPITAL | Age: 38
LOS: 2 days | Discharge: HOME | DRG: 690 | End: 2018-02-07
Attending: EMERGENCY MEDICINE | Admitting: HOSPITALIST
Payer: COMMERCIAL

## 2018-02-05 VITALS
TEMPERATURE: 98 F | SYSTOLIC BLOOD PRESSURE: 131 MMHG | HEART RATE: 70 BPM | BODY MASS INDEX: 20.32 KG/M2 | DIASTOLIC BLOOD PRESSURE: 76 MMHG | WEIGHT: 107.63 LBS | OXYGEN SATURATION: 98 % | HEIGHT: 61 IN | RESPIRATION RATE: 18 BRPM

## 2018-02-05 DIAGNOSIS — N39.0 URINARY TRACT INFECTION WITHOUT HEMATURIA, SITE UNSPECIFIED: ICD-10-CM

## 2018-02-05 DIAGNOSIS — R94.31 QT PROLONGATION: ICD-10-CM

## 2018-02-05 DIAGNOSIS — A49.8 INFECTION DUE TO ESBL-PRODUCING ESCHERICHIA COLI: ICD-10-CM

## 2018-02-05 DIAGNOSIS — R07.9 CHEST PAIN: ICD-10-CM

## 2018-02-05 DIAGNOSIS — I10 HTN (HYPERTENSION): ICD-10-CM

## 2018-02-05 DIAGNOSIS — B96.29 UTI DUE TO EXTENDED-SPECTRUM BETA LACTAMASE (ESBL) PRODUCING ESCHERICHIA COLI: ICD-10-CM

## 2018-02-05 DIAGNOSIS — N39.0 UTI DUE TO EXTENDED-SPECTRUM BETA LACTAMASE (ESBL) PRODUCING ESCHERICHIA COLI: ICD-10-CM

## 2018-02-05 DIAGNOSIS — Z16.12 INFECTION DUE TO ESBL-PRODUCING ESCHERICHIA COLI: ICD-10-CM

## 2018-02-05 DIAGNOSIS — Z16.12 INFECTION DUE TO ESBL-PRODUCING ESCHERICHIA COLI: Primary | ICD-10-CM

## 2018-02-05 DIAGNOSIS — K50.90 CROHN'S DISEASE: Chronic | ICD-10-CM

## 2018-02-05 DIAGNOSIS — Z16.12 UTI DUE TO EXTENDED-SPECTRUM BETA LACTAMASE (ESBL) PRODUCING ESCHERICHIA COLI: ICD-10-CM

## 2018-02-05 DIAGNOSIS — A49.8 INFECTION DUE TO ESBL-PRODUCING ESCHERICHIA COLI: Primary | ICD-10-CM

## 2018-02-05 PROBLEM — Z79.52 CURRENT CHRONIC USE OF SYSTEMIC STEROIDS: Status: ACTIVE | Noted: 2018-02-03

## 2018-02-05 LAB
ALBUMIN SERPL BCP-MCNC: 2.8 G/DL
ALP SERPL-CCNC: 48 U/L
ALT SERPL W/O P-5'-P-CCNC: 11 U/L
ANION GAP SERPL CALC-SCNC: 8 MMOL/L
AST SERPL-CCNC: 10 U/L
BACTERIA UR CULT: NORMAL
BASOPHILS # BLD AUTO: 0 K/UL
BASOPHILS NFR BLD: 0 %
BILIRUB SERPL-MCNC: 0.2 MG/DL
BILIRUB UR QL STRIP: NEGATIVE
BUN SERPL-MCNC: 7 MG/DL
CALCIUM SERPL-MCNC: 8.7 MG/DL
CHLORIDE SERPL-SCNC: 107 MMOL/L
CLARITY UR REFRACT.AUTO: CLEAR
CO2 SERPL-SCNC: 27 MMOL/L
COLOR UR AUTO: NORMAL
CREAT SERPL-MCNC: 0.7 MG/DL
DIFFERENTIAL METHOD: ABNORMAL
EOSINOPHIL # BLD AUTO: 0 K/UL
EOSINOPHIL NFR BLD: 0 %
ERYTHROCYTE [DISTWIDTH] IN BLOOD BY AUTOMATED COUNT: 14.1 %
EST. GFR  (AFRICAN AMERICAN): >60 ML/MIN/1.73 M^2
EST. GFR  (NON AFRICAN AMERICAN): >60 ML/MIN/1.73 M^2
GLUCOSE SERPL-MCNC: 118 MG/DL
GLUCOSE UR QL STRIP: NEGATIVE
HCT VFR BLD AUTO: 33.2 %
HGB BLD-MCNC: 11.3 G/DL
HGB UR QL STRIP: NEGATIVE
IMM GRANULOCYTES # BLD AUTO: 0.02 K/UL
IMM GRANULOCYTES NFR BLD AUTO: 0.2 %
KETONES UR QL STRIP: NEGATIVE
LEUKOCYTE ESTERASE UR QL STRIP: NEGATIVE
LYMPHOCYTES # BLD AUTO: 1.3 K/UL
LYMPHOCYTES NFR BLD: 13 %
MAGNESIUM SERPL-MCNC: 1.6 MG/DL
MCH RBC QN AUTO: 31.3 PG
MCHC RBC AUTO-ENTMCNC: 34 G/DL
MCV RBC AUTO: 92 FL
MONOCYTES # BLD AUTO: 0.4 K/UL
MONOCYTES NFR BLD: 4 %
NEUTROPHILS # BLD AUTO: 8 K/UL
NEUTROPHILS NFR BLD: 82.8 %
NITRITE UR QL STRIP: NEGATIVE
NRBC BLD-RTO: 0 /100 WBC
PH UR STRIP: 7 [PH] (ref 5–8)
PHOSPHATE SERPL-MCNC: 3 MG/DL
PLATELET # BLD AUTO: 290 K/UL
PMV BLD AUTO: 9.9 FL
POTASSIUM SERPL-SCNC: 3.8 MMOL/L
PROT SERPL-MCNC: 6.2 G/DL
PROT UR QL STRIP: NEGATIVE
RBC # BLD AUTO: 3.61 M/UL
SODIUM SERPL-SCNC: 142 MMOL/L
SP GR UR STRIP: 1.01 (ref 1–1.03)
URN SPEC COLLECT METH UR: NORMAL
UROBILINOGEN UR STRIP-ACNC: NEGATIVE EU/DL
WBC # BLD AUTO: 9.69 K/UL

## 2018-02-05 PROCEDURE — 96361 HYDRATE IV INFUSION ADD-ON: CPT

## 2018-02-05 PROCEDURE — 99239 HOSP IP/OBS DSCHRG MGMT >30: CPT | Mod: ,,, | Performed by: HOSPITALIST

## 2018-02-05 PROCEDURE — 96365 THER/PROPH/DIAG IV INF INIT: CPT

## 2018-02-05 PROCEDURE — 85025 COMPLETE CBC W/AUTO DIFF WBC: CPT

## 2018-02-05 PROCEDURE — 12000002 HC ACUTE/MED SURGE SEMI-PRIVATE ROOM

## 2018-02-05 PROCEDURE — 25000003 PHARM REV CODE 250: Performed by: STUDENT IN AN ORGANIZED HEALTH CARE EDUCATION/TRAINING PROGRAM

## 2018-02-05 PROCEDURE — 93005 ELECTROCARDIOGRAM TRACING: CPT

## 2018-02-05 PROCEDURE — 63600175 PHARM REV CODE 636 W HCPCS: Performed by: STUDENT IN AN ORGANIZED HEALTH CARE EDUCATION/TRAINING PROGRAM

## 2018-02-05 PROCEDURE — 25000003 PHARM REV CODE 250: Performed by: INTERNAL MEDICINE

## 2018-02-05 PROCEDURE — 83735 ASSAY OF MAGNESIUM: CPT

## 2018-02-05 PROCEDURE — 87086 URINE CULTURE/COLONY COUNT: CPT

## 2018-02-05 PROCEDURE — 84100 ASSAY OF PHOSPHORUS: CPT

## 2018-02-05 PROCEDURE — 99284 EMERGENCY DEPT VISIT MOD MDM: CPT | Mod: ,,, | Performed by: EMERGENCY MEDICINE

## 2018-02-05 PROCEDURE — 81003 URINALYSIS AUTO W/O SCOPE: CPT

## 2018-02-05 PROCEDURE — 93010 ELECTROCARDIOGRAM REPORT: CPT | Mod: ,,, | Performed by: INTERNAL MEDICINE

## 2018-02-05 PROCEDURE — 96375 TX/PRO/DX INJ NEW DRUG ADDON: CPT

## 2018-02-05 PROCEDURE — 99285 EMERGENCY DEPT VISIT HI MDM: CPT | Mod: 25

## 2018-02-05 PROCEDURE — 80053 COMPREHEN METABOLIC PANEL: CPT

## 2018-02-05 PROCEDURE — 63600175 PHARM REV CODE 636 W HCPCS: Performed by: INTERNAL MEDICINE

## 2018-02-05 PROCEDURE — 63600175 PHARM REV CODE 636 W HCPCS: Performed by: HOSPITALIST

## 2018-02-05 RX ORDER — ONDANSETRON 2 MG/ML
4 INJECTION INTRAMUSCULAR; INTRAVENOUS
Status: COMPLETED | OUTPATIENT
Start: 2018-02-05 | End: 2018-02-06

## 2018-02-05 RX ORDER — ERGOCALCIFEROL 1.25 MG/1
50000 CAPSULE ORAL
Qty: 4 CAPSULE | Refills: 3 | Status: ON HOLD | OUTPATIENT
Start: 2018-02-12 | End: 2018-02-06

## 2018-02-05 RX ORDER — OXYCODONE AND ACETAMINOPHEN 5; 325 MG/1; MG/1
2 TABLET ORAL
Status: COMPLETED | OUTPATIENT
Start: 2018-02-05 | End: 2018-02-06

## 2018-02-05 RX ORDER — ERGOCALCIFEROL 1.25 MG/1
50000 CAPSULE ORAL
Status: DISCONTINUED | OUTPATIENT
Start: 2018-02-05 | End: 2018-02-05 | Stop reason: HOSPADM

## 2018-02-05 RX ORDER — HEPARIN 100 UNIT/ML
5 SYRINGE INTRAVENOUS ONCE
Status: COMPLETED | OUTPATIENT
Start: 2018-02-05 | End: 2018-02-05

## 2018-02-05 RX ORDER — HEPARIN SODIUM,PORCINE/PF 10 UNIT/ML
10 SYRINGE (ML) INTRAVENOUS ONCE
Status: DISCONTINUED | OUTPATIENT
Start: 2018-02-05 | End: 2018-02-05

## 2018-02-05 RX ORDER — SULFAMETHOXAZOLE AND TRIMETHOPRIM 800; 160 MG/1; MG/1
1 TABLET ORAL 2 TIMES DAILY
Qty: 6 TABLET | Refills: 0 | Status: ON HOLD | OUTPATIENT
Start: 2018-02-05 | End: 2018-02-06

## 2018-02-05 RX ORDER — PREDNISONE 10 MG/1
10 TABLET ORAL DAILY
Qty: 60 TABLET | Refills: 0 | Status: ON HOLD | OUTPATIENT
Start: 2018-02-05 | End: 2018-02-17

## 2018-02-05 RX ORDER — FLUCONAZOLE 100 MG/1
100 TABLET ORAL
Status: COMPLETED | OUTPATIENT
Start: 2018-02-06 | End: 2018-02-06

## 2018-02-05 RX ADMIN — OXYCODONE HYDROCHLORIDE AND ACETAMINOPHEN 1 TABLET: 10; 325 TABLET ORAL at 10:02

## 2018-02-05 RX ADMIN — SODIUM CHLORIDE, SODIUM LACTATE, POTASSIUM CHLORIDE, AND CALCIUM CHLORIDE 1000 ML: .6; .31; .03; .02 INJECTION, SOLUTION INTRAVENOUS at 01:02

## 2018-02-05 RX ADMIN — DRONABINOL 2.5 MG: 2.5 CAPSULE ORAL at 01:02

## 2018-02-05 RX ADMIN — CALCIUM 500 MG: 500 TABLET ORAL at 09:02

## 2018-02-05 RX ADMIN — PROMETHAZINE HYDROCHLORIDE 25 MG: 25 TABLET ORAL at 10:02

## 2018-02-05 RX ADMIN — HYDROCORTISONE SODIUM SUCCINATE 100 MG: 100 INJECTION, POWDER, FOR SOLUTION INTRAMUSCULAR; INTRAVENOUS at 01:02

## 2018-02-05 RX ADMIN — HYDROCORTISONE SODIUM SUCCINATE 100 MG: 100 INJECTION, POWDER, FOR SOLUTION INTRAMUSCULAR; INTRAVENOUS at 06:02

## 2018-02-05 RX ADMIN — ALPRAZOLAM 0.5 MG: 0.5 TABLET ORAL at 01:02

## 2018-02-05 RX ADMIN — HEPARIN 500 UNITS: 100 SYRINGE at 03:02

## 2018-02-05 RX ADMIN — ONDANSETRON 8 MG: 4 TABLET, ORALLY DISINTEGRATING ORAL at 09:02

## 2018-02-05 RX ADMIN — ERGOCALCIFEROL 50000 UNITS: 1.25 CAPSULE ORAL at 09:02

## 2018-02-05 RX ADMIN — DRONABINOL 2.5 MG: 2.5 CAPSULE ORAL at 09:02

## 2018-02-05 RX ADMIN — OXYCODONE HYDROCHLORIDE AND ACETAMINOPHEN 1 TABLET: 10; 325 TABLET ORAL at 04:02

## 2018-02-05 RX ADMIN — PREDNISONE 10 MG: 10 TABLET ORAL at 09:02

## 2018-02-05 RX ADMIN — ONDANSETRON 8 MG: 4 TABLET, ORALLY DISINTEGRATING ORAL at 12:02

## 2018-02-05 RX ADMIN — CITALOPRAM HYDROBROMIDE 20 MG: 20 TABLET ORAL at 09:02

## 2018-02-05 RX ADMIN — LISINOPRIL 10 MG: 10 TABLET ORAL at 09:02

## 2018-02-05 NOTE — PLAN OF CARE
02/05/18 1300   Discharge Assessment   Assessment Type Discharge Planning Assessment   Confirmed/corrected address and phone number on facesheet? Yes   Assessment information obtained from? Patient;Medical Record  (mother at bedside)   Expected Length of Stay (days) 3   Communicated expected length of stay with patient/caregiver yes   Prior to hospitilization cognitive status: Alert/Oriented   Prior to hospitalization functional status: Independent   Current cognitive status: Alert/Oriented   Current Functional Status: Independent   Lives With child(katalina), dependent;parent(s)   Able to Return to Prior Arrangements yes   Is patient able to care for self after discharge? Yes   Who are your caregiver(s) and their phone number(s)? parents assist as needed   Patient's perception of discharge disposition home or selfcare   Readmission Within The Last 30 Days no previous admission in last 30 days   Patient currently being followed by outpatient case management? No   Patient currently receives any other outside agency services? No   Equipment Currently Used at Home colostomy/ostomy supplies   Do you have any problems affording any of your prescribed medications? TBD   Is the patient taking medications as prescribed? yes   Does the patient have transportation home? Yes   Transportation Available family or friend will provide   Does the patient receive services at the [facility]? No   Discharge Plan A Home with family   Patient/Family In Agreement With Plan yes

## 2018-02-05 NOTE — CONSULTS
Cardiology Consult Note  Attending Physician: Flaquita Miller MD  Reason for Consult: paroxysmal SVT     HPI:   37 y.o. woman with PMH of Crohn's Disease on chronic immunosuppression, HTN who presented to the hospital on 2/2/18 and admitted to  on 2/3/18 for sepsis 2/2 UTI due to GNRs (seen on Ucx with Bcx showing NGTD). She presented with complaints of chest burning, SOB, nausea, lightheadedness, and subjective fevers. She called 911 and was brought to the ED. Work-up thus far has revealed EKGs consistent with sinus tachycardia and NSR with normal rate w/o any ischemic ST or T wave abnormalities. TTE on 2/3/18 showed normal LVEF, normal RVSF with no structural abnormalities. Tn negative x2. Telemetry shows sinus jose elias during night time and occasional elevation in HR to 110s (sinus). Rate increased gradually without any PAC/PVC initiating it. She reported 1 episode similar to this 1 week ago which prompted her to go to the ED and was noted to have sinus tachycardia which resolved with IV fluids. CTA was negative for PE.     Prior to these two episodes, she has not had any prior palpitations, CP, SOB, NV, diaphoresis, LH, LOC. Currently reports subjective fever with no other complaints. Tele shows NSR in 70s currently.    ROS:    Constitution: Negative for fever, chills, weight loss or gain.   HENT: Negative for sore throat, rhinorrhea, or headache.  Eyes: Negative for blurred or double vision.   Cardiovascular: See above  Pulmonary: Negative for SOB   Gastrointestinal: Negative for abdominal pain, nausea, vomiting, or diarrhea.   : Negative for dysuria.   Neurological: Negative for focal weakness or sensory changes.  PMH:     Past Medical History:   Diagnosis Date    Abnormal Pap smear 2007    Abnormal Pap smear 5/26/2011    Anemia     Anxiety     Arthritis     C. difficile diarrhea     Crohn's disease     Depression 8/5/2017    Encounter for blood transfusion     Genital HSV     History of  colposcopy with cervical biopsy 2007 and 7/2011 2007-LYLA I  and 7/2011- LYLA I    Hypertension     Kidney stone     Kidney stone     Recurrent UTI 4/3/2013    S/P ileostomy 7/9/2012    Sterilization 6/23/2012     Past Surgical History:   Procedure Laterality Date    ABDOMINAL SURGERY      APPENDECTOMY      BLADDER SURGERY      partial cystectomy due to fistula    CKC      COLON SURGERY      COLONOSCOPY      HYSTERECTOMY  04/16/2015    SHELLIE    ILEOSTOMY      OOPHORECTOMY Right 04/16/2015    PORTACATH PLACEMENT      SKIN BIOPSY      SMALL INTESTINE SURGERY      TOTAL COLECTOMY      TUBAL LIGATION  06 06 2012    UPPER GASTROINTESTINAL ENDOSCOPY       Allergies:     Review of patient's allergies indicates:   Allergen Reactions    Vancomycin analogues Hives    Azathioprine sodium      Other reaction(s): pancreatitis  Other reaction(s): pancreatitis     Medications:     No current facility-administered medications on file prior to encounter.      Current Outpatient Prescriptions on File Prior to Encounter   Medication Sig Dispense Refill    ALPRAZolam (XANAX) 0.5 MG tablet TAKE 1 TABLET BY MOUTH TWICE A DAY 60 tablet 0    citalopram (CELEXA) 20 MG tablet Take 1 tablet (20 mg total) by mouth once daily. 90 tablet 3    diphenoxylate-atropine 2.5-0.025 mg (LOMOTIL) 2.5-0.025 mg per tablet TAKE 1 TABLET BY MOUTH 3 TIMES A DAY AS NEEDED FOR DIARRHEA 90 tablet 0    dronabinol (MARINOL) 2.5 MG capsule Take 1 capsule (2.5 mg total) by mouth 2 (two) times daily before meals. 60 capsule 3    lisinopril 10 MG tablet TAKE 1 TABLET (10 MG TOTAL) BY MOUTH ONCE DAILY. 90 tablet 2    loperamide (IMODIUM) 2 mg capsule Take 2 mg by mouth daily as needed for Diarrhea.      ondansetron (ZOFRAN-ODT) 8 MG TbDL TAKE 1 TABLET (8 MG TOTAL) BY MOUTH EVERY 8 (EIGHT) HOURS AS NEEDED (NAUSEA). 30 tablet 0    oxyCODONE-acetaminophen (PERCOCET)  mg per tablet Take 1 tablet by mouth every 6 (six) hours as needed for  Pain. 40 tablet 0    potassium citrate 15 mEq TbSR Take 2 tablets by mouth 2 (two) times daily. 360 tablet 3    sumatriptan (IMITREX) 100 MG tablet TAKE 1 TABLET BY MOUTH EVERY 2 HOURS AS NEEDED FOR MIGRAINE. DO NOT EXCEED 2 DOSES/ 24HRS 9 tablet 1    tacrolimus (PROTOPIC) 0.1 % ointment Apply 1 application topically 2 (two) times daily as needed (for rash on face).   4    zolpidem (AMBIEN) 10 mg Tab TAKE 1 TABLET (10 MG TOTAL) BY MOUTH EVERY EVENING. 30 tablet 0    acetaminophen (TYLENOL) 650 MG TbSR Take 650 mg by mouth daily as needed (fever/pain).      IBUPROFEN ORAL Take 400-600 mg by mouth daily as needed (fever/pain).      metroNIDAZOLE (FLAGYL) 500 MG tablet TAKE 1 TABLET (500 MG TOTAL) BY MOUTH 3 (THREE) TIMES DAILY. 21 tablet 0    promethazine (PHENERGAN) 25 MG tablet TAKE 1 TABLET BY MOUTH EVERY 6 HOURS AS NEEDED FOR NAUSEA 30 tablet 3       Inpatient Medications   Continuous Infusions:  Scheduled Meds:   calcium carbonate  500 mg Oral BID WM    cefTRIAXone (ROCEPHIN) IVPB  1 g Intravenous Q24H    citalopram  20 mg Oral Daily    dronabinol  2.5 mg Oral TID WM    ergocalciferol  50,000 Units Oral Q7 Days    hydrocortisone sodium succinate  100 mg Intravenous Q8H    lisinopril  10 mg Oral Daily    predniSONE  10 mg Oral Daily     PRN Meds:ALPRAZolam, dextrose 50%, dextrose 50%, diphenoxylate-atropine 2.5-0.025 mg, glucagon (human recombinant), glucose, glucose, loperamide, ondansetron, oxyCODONE-acetaminophen, promethazine, sodium chloride 0.9%, zolpidem     Social History:     Social History   Substance Use Topics    Smoking status: Never Smoker    Smokeless tobacco: Never Used    Alcohol use 0.0 oz/week      Comment: Patient only drinks wine not regular basis.     Family History:     Family History   Problem Relation Age of Onset    Colon cancer Father     Cancer Father      colon cancer    Hypertension Mother     Cancer Maternal Grandfather      esopghal     Skin cancer Maternal  "Grandfather     Endometrial cancer Maternal Aunt     Crohn's disease Brother     Breast cancer Neg Hx     Ovarian cancer Neg Hx      Physical Exam:     Vitals:  Temp:  [98.3 °F (36.8 °C)-99.2 °F (37.3 °C)]   Pulse:  []   Resp:  [16-20]   BP: (129-147)/(74-91)   SpO2:  [97 %-100 %]     /74   Pulse 68   Temp 99 °F (37.2 °C)   Resp 17   Ht 5' 1" (1.549 m)   Wt 48.8 kg (107 lb 10.4 oz)   LMP 03/30/2015   SpO2 98%   Breastfeeding? No   BMI 20.34 kg/m²   I/O's:    Intake/Output Summary (Last 24 hours) at 02/05/18 1224  Last data filed at 02/05/18 0600   Gross per 24 hour   Intake                0 ml   Output              450 ml   Net             -450 ml       Wt Readings from Last 10 Encounters:   02/05/18 48.8 kg (107 lb 10.4 oz)   01/26/18 45.4 kg (100 lb)   01/24/18 44.4 kg (97 lb 14.2 oz)   01/22/18 43.9 kg (96 lb 12.5 oz)   11/30/17 41 kg (90 lb 8 oz)   11/20/17 43 kg (94 lb 12.8 oz)   11/17/17 42.1 kg (92 lb 13 oz)   11/09/17 42.3 kg (93 lb 4.1 oz)   11/01/17 42.1 kg (92 lb 13 oz)   10/23/17 41.5 kg (91 lb 7.9 oz)        Constitutional: NAD, conversant  HEENT: Sclera anicteric, PERRLA, EOMI, dry MM  Neck: No JVD, no carotid bruits  CV: RRR, no murmur, normal S1/S2, No Pericardial rub  Pulm: CTAB with no wheezes, rales, or rhonchi, left chest wall port  GI: Abdomen soft, NTND, +BS  Extremities: No LE edema, warm and well perfused, No cyanosis, No clubbing  Skin: No ecchymosis, erythema, or ulcers  Psych: AOx3, appropriate affect  Neuro: CNII-XII intact, no focal deficits      Labs:       Recent Labs  Lab 02/03/18  0535 02/04/18  0554 02/05/18  0441    139 142   K 3.5 3.7 3.8   * 107 107   CO2 23 26 27   BUN 6 6 7   CREATININE 0.7 0.6 0.7   ANIONGAP 6* 6* 8       Recent Labs  Lab 02/03/18  0535 02/04/18  0554 02/05/18  0441   AST 16 14 10   ALT 13 12 11   ALKPHOS 48* 50* 48*   BILITOT 0.4 0.1 0.2   ALBUMIN 2.8* 2.8* 2.8*       Recent Labs  Lab 02/02/18  2111 02/03/18  2319 "   TROPONINI <0.006 <0.006       Recent Labs  Lab 02/02/18  2339   PH 7.358   PCO2 40.1   PO2 50   HCO3 22.5*   POCSATURATED 83*      Recent Labs  Lab 02/03/18  0535 02/04/18  0554 02/05/18  0441   WBC 11.73 9.15 9.69   HGB 10.9* 10.6* 11.3*   HCT 33.1* 31.3* 33.2*    292 290   GRAN 72.7  8.5* 76.0*  7.0 82.8*  8.0*       Recent Labs  Lab 02/02/18  2111   INR 1.1     Lab Results   Component Value Date    CHOL 225 (H) 10/04/2016    HDL 62 10/04/2016    LDLCALC 133.2 10/04/2016    TRIG 149 10/04/2016     Lab Results   Component Value Date    HGBA1C 4.6 02/03/2018    TSH 2.047 01/27/2018        Micro:  Blood Cultures  Lab Results   Component Value Date    LABBLOO No Growth to date 02/02/2018    LABBLOO No Growth to date 02/02/2018    LABBLOO No Growth to date 02/02/2018    LABBLOO No Growth to date 02/02/2018    LABBLOO No Growth to date 02/02/2018    LABBLOO No Growth to date 02/02/2018     Urine Cultures  Lab Results   Component Value Date    LABURIN  02/02/2018     GRAM NEGATIVE LULU, LACTOSE   >100,000 cfu/ml  Identification and susceptibility pending      LABURIN No growth 10/23/2017    LABURIN ESCHERICHIA COLI ESBL  >100,000 cfu/ml   10/16/2017    LABURIN No growth 08/22/2017    LABURIN ESCHERICHIA COLI ESBL  >100,000 cfu/ml   07/12/2017       Imaging:   CXR: No acute findings in the chest     CTA Chest (1/27/18):     No evidence of pulmonary embolism.    No acute intrathoracic process.    TTE (2/3/18):   1 - Normal left ventricular systolic function (EF 60-65%).     2 - No wall motion abnormalities.     3 - Normal left ventricular diastolic function.     4 - Normal right ventricular systolic function .     5 - The estimated PA systolic pressure is 16 mmHg.     EF   Date Value Ref Range Status   02/03/2018 65 55 - 65        EKG: see HPI    Telemetry: see HPI    Assessment:   37 y.o. woman with PMH of Crohn's Disease on chronic immunosuppression, HTN who presented to the hospital on 2/2/18 and  admitted to  on 2/3/18 for sepsis 2/2 UTI due to GNRs (seen on Ucx with Bcx showing NGTD). She presented with complaints of chest burning, SOB, nausea, lightheadedness, and subjective fevers.     Plan:   Atypical chest pain / palpitations / lightheadedness  - EKG non-ischemic, Tn negative x 2, TTE w/ normal LVEF, no WMAs and no structural abnormality  - EKG - sinus tachycardia which has now improved  - Tele - NSR, with gradual increase in HR towards sinus tachycardia in 110s which resolves after few minutes  - resolved with IV fluids administration suggesting dehydration  - no prior symptoms prior to the one one admission and 1 week prior which resolved with IV fluids  - CTA recently negative for PE or any acute intrathoracic process    Suspect dehydration 2/2 underlying infection with underlying gastrointestinal pathology is the cause.   Recommend aggressive IV fluid hydration and encourage PO fluid intake.  No need for cardiac monitoring as outpatient as she already has had 48 hours of telemetry w/o any significant arrhythmias noted.  F/u with PCP. If experiences recurrent symptoms despite IV fluid hydration - would recommend to f/u in cardiology clinic as outpatient for further management.    Discussed with Dr. Flynn.      Signed:  Adriano Carmona MD  Cardiology Fellow  Pager: [phone]  2/5/2018 12:24 PM

## 2018-02-05 NOTE — TELEPHONE ENCOUNTER
----- Message from Tanya Hough LPN sent at 2/5/2018  9:53 AM CST -----  The above patient is currently inpatient. She is on the schedule to see Dr. Astorga on tomorrow. Reach out to patient to reschedule if applicable. Thanks.

## 2018-02-05 NOTE — PLAN OF CARE
Problem: Patient Care Overview  Goal: Plan of Care Review  Outcome: Ongoing (interventions implemented as appropriate)  POC was reviewed with pt. Pt AAOx4, cooperative and complied with care. ABT therapy continues w/o adverse effects. Continuous IVF LR solution at 125cc/hr via Port cath to LCW. Visited by family. Pain management continues with PRN percocet with effect. No c/o voiced. All needs attended.

## 2018-02-05 NOTE — TELEPHONE ENCOUNTER
IM3 intern notified patient of urine culture results as ESBL.  Will provide patient with 3 days Bactrim DS po BID.    Antwon Rizvi MD  PGY - 2  Pager: [phone]

## 2018-02-05 NOTE — TELEPHONE ENCOUNTER
Please advise if pt appt tomorrow is needed as pt is currently in hospital. Please advise/authorize?

## 2018-02-06 ENCOUNTER — PATIENT MESSAGE (OUTPATIENT)
Dept: GASTROENTEROLOGY | Facility: CLINIC | Age: 38
End: 2018-02-06

## 2018-02-06 PROBLEM — Z16.12 UTI DUE TO EXTENDED-SPECTRUM BETA LACTAMASE (ESBL) PRODUCING ESCHERICHIA COLI: Status: ACTIVE | Noted: 2018-02-05

## 2018-02-06 PROBLEM — B96.29 UTI DUE TO EXTENDED-SPECTRUM BETA LACTAMASE (ESBL) PRODUCING ESCHERICHIA COLI: Status: ACTIVE | Noted: 2018-02-05

## 2018-02-06 PROBLEM — A41.9 SEPSIS SECONDARY TO UTI: Status: RESOLVED | Noted: 2018-02-03 | Resolved: 2018-02-06

## 2018-02-06 PROBLEM — R11.2 NON-INTRACTABLE VOMITING WITH NAUSEA: Status: ACTIVE | Noted: 2018-02-06

## 2018-02-06 PROBLEM — N39.0 SEPSIS SECONDARY TO UTI: Status: RESOLVED | Noted: 2018-02-03 | Resolved: 2018-02-06

## 2018-02-06 PROBLEM — N39.0 RECURRENT UTI: Chronic | Status: ACTIVE | Noted: 2018-02-06

## 2018-02-06 LAB
ALBUMIN SERPL BCP-MCNC: 3 G/DL
ALP SERPL-CCNC: 49 U/L
ALT SERPL W/O P-5'-P-CCNC: 30 U/L
ANION GAP SERPL CALC-SCNC: 9 MMOL/L
AST SERPL-CCNC: 35 U/L
B-HCG UR QL: NEGATIVE
BASOPHILS # BLD AUTO: 0.01 K/UL
BASOPHILS NFR BLD: 0.1 %
BILIRUB SERPL-MCNC: 0.2 MG/DL
BUN SERPL-MCNC: 6 MG/DL
C DIFF GDH STL QL: NEGATIVE
C DIFF TOX A+B STL QL IA: NEGATIVE
CALCIUM SERPL-MCNC: 8.6 MG/DL
CHLORIDE SERPL-SCNC: 106 MMOL/L
CO2 SERPL-SCNC: 26 MMOL/L
CREAT SERPL-MCNC: 0.6 MG/DL
DIFFERENTIAL METHOD: ABNORMAL
EOSINOPHIL # BLD AUTO: 0 K/UL
EOSINOPHIL NFR BLD: 0 %
ERYTHROCYTE [DISTWIDTH] IN BLOOD BY AUTOMATED COUNT: 14.1 %
EST. GFR  (AFRICAN AMERICAN): >60 ML/MIN/1.73 M^2
EST. GFR  (NON AFRICAN AMERICAN): >60 ML/MIN/1.73 M^2
ESTIMATED AVG GLUCOSE: 85 MG/DL
GLUCOSE SERPL-MCNC: 94 MG/DL
HBA1C MFR BLD HPLC: 4.6 %
HCT VFR BLD AUTO: 33.1 %
HGB BLD-MCNC: 11.1 G/DL
IMM GRANULOCYTES # BLD AUTO: 0.05 K/UL
IMM GRANULOCYTES NFR BLD AUTO: 0.4 %
LYMPHOCYTES # BLD AUTO: 2.4 K/UL
LYMPHOCYTES NFR BLD: 18 %
MCH RBC QN AUTO: 30.4 PG
MCHC RBC AUTO-ENTMCNC: 33.5 G/DL
MCV RBC AUTO: 91 FL
MONOCYTES # BLD AUTO: 1.2 K/UL
MONOCYTES NFR BLD: 9.2 %
NEUTROPHILS # BLD AUTO: 9.7 K/UL
NEUTROPHILS NFR BLD: 72.3 %
NRBC BLD-RTO: 0 /100 WBC
PLATELET # BLD AUTO: 315 K/UL
PMV BLD AUTO: 9.7 FL
POTASSIUM SERPL-SCNC: 2.9 MMOL/L
PROT SERPL-MCNC: 6.5 G/DL
RBC # BLD AUTO: 3.65 M/UL
SODIUM SERPL-SCNC: 141 MMOL/L
WBC # BLD AUTO: 13.44 K/UL

## 2018-02-06 PROCEDURE — 80053 COMPREHEN METABOLIC PANEL: CPT

## 2018-02-06 PROCEDURE — 25500020 PHARM REV CODE 255: Performed by: HOSPITALIST

## 2018-02-06 PROCEDURE — 25000003 PHARM REV CODE 250: Performed by: STUDENT IN AN ORGANIZED HEALTH CARE EDUCATION/TRAINING PROGRAM

## 2018-02-06 PROCEDURE — 87040 BLOOD CULTURE FOR BACTERIA: CPT

## 2018-02-06 PROCEDURE — 85025 COMPLETE CBC W/AUTO DIFF WBC: CPT

## 2018-02-06 PROCEDURE — 99223 1ST HOSP IP/OBS HIGH 75: CPT | Mod: ,,, | Performed by: INTERNAL MEDICINE

## 2018-02-06 PROCEDURE — 63600175 PHARM REV CODE 636 W HCPCS: Performed by: STUDENT IN AN ORGANIZED HEALTH CARE EDUCATION/TRAINING PROGRAM

## 2018-02-06 PROCEDURE — 11000001 HC ACUTE MED/SURG PRIVATE ROOM

## 2018-02-06 PROCEDURE — 25000003 PHARM REV CODE 250: Performed by: EMERGENCY MEDICINE

## 2018-02-06 PROCEDURE — 87449 NOS EACH ORGANISM AG IA: CPT

## 2018-02-06 PROCEDURE — 93005 ELECTROCARDIOGRAM TRACING: CPT

## 2018-02-06 PROCEDURE — 81025 URINE PREGNANCY TEST: CPT

## 2018-02-06 PROCEDURE — 25000003 PHARM REV CODE 250

## 2018-02-06 PROCEDURE — 99222 1ST HOSP IP/OBS MODERATE 55: CPT | Mod: ,,, | Performed by: HOSPITALIST

## 2018-02-06 PROCEDURE — 93010 ELECTROCARDIOGRAM REPORT: CPT | Mod: ,,, | Performed by: INTERNAL MEDICINE

## 2018-02-06 PROCEDURE — 63600175 PHARM REV CODE 636 W HCPCS

## 2018-02-06 PROCEDURE — 83036 HEMOGLOBIN GLYCOSYLATED A1C: CPT

## 2018-02-06 RX ORDER — ONDANSETRON 8 MG/1
8 TABLET, ORALLY DISINTEGRATING ORAL EVERY 8 HOURS PRN
Status: DISCONTINUED | OUTPATIENT
Start: 2018-02-06 | End: 2018-02-07 | Stop reason: HOSPADM

## 2018-02-06 RX ORDER — OXYCODONE AND ACETAMINOPHEN 10; 325 MG/1; MG/1
1 TABLET ORAL EVERY 6 HOURS PRN
Status: DISCONTINUED | OUTPATIENT
Start: 2018-02-06 | End: 2018-02-07 | Stop reason: HOSPADM

## 2018-02-06 RX ORDER — DRONABINOL 2.5 MG/1
2.5 CAPSULE ORAL
Status: DISCONTINUED | OUTPATIENT
Start: 2018-02-06 | End: 2018-02-06

## 2018-02-06 RX ORDER — SUMATRIPTAN 50 MG/1
100 TABLET, FILM COATED ORAL
Status: DISCONTINUED | OUTPATIENT
Start: 2018-02-06 | End: 2018-02-07 | Stop reason: HOSPADM

## 2018-02-06 RX ORDER — ENOXAPARIN SODIUM 100 MG/ML
40 INJECTION SUBCUTANEOUS EVERY 24 HOURS
Status: DISCONTINUED | OUTPATIENT
Start: 2018-02-06 | End: 2018-02-07 | Stop reason: HOSPADM

## 2018-02-06 RX ORDER — LISINOPRIL 10 MG/1
10 TABLET ORAL DAILY
Status: DISCONTINUED | OUTPATIENT
Start: 2018-02-06 | End: 2018-02-07 | Stop reason: HOSPADM

## 2018-02-06 RX ORDER — OXYCODONE HYDROCHLORIDE 5 MG/1
10 TABLET ORAL EVERY 4 HOURS PRN
Status: DISCONTINUED | OUTPATIENT
Start: 2018-02-06 | End: 2018-02-06

## 2018-02-06 RX ORDER — ALPRAZOLAM 0.5 MG/1
0.5 TABLET ORAL
Status: COMPLETED | OUTPATIENT
Start: 2018-02-06 | End: 2018-02-06

## 2018-02-06 RX ORDER — PREDNISONE 10 MG/1
10 TABLET ORAL DAILY
Status: DISCONTINUED | OUTPATIENT
Start: 2018-02-06 | End: 2018-02-07 | Stop reason: HOSPADM

## 2018-02-06 RX ORDER — HYDROCODONE BITARTRATE AND ACETAMINOPHEN 5; 325 MG/1; MG/1
1 TABLET ORAL EVERY 4 HOURS PRN
Status: DISCONTINUED | OUTPATIENT
Start: 2018-02-06 | End: 2018-02-07 | Stop reason: HOSPADM

## 2018-02-06 RX ORDER — POTASSIUM CHLORIDE 750 MG/1
50 CAPSULE, EXTENDED RELEASE ORAL ONCE
Status: DISCONTINUED | OUTPATIENT
Start: 2018-02-06 | End: 2018-02-06

## 2018-02-06 RX ORDER — CITALOPRAM 20 MG/1
20 TABLET, FILM COATED ORAL DAILY
Status: ON HOLD | COMMUNITY
End: 2018-02-07 | Stop reason: HOSPADM

## 2018-02-06 RX ORDER — IBUPROFEN 200 MG
24 TABLET ORAL
Status: DISCONTINUED | OUTPATIENT
Start: 2018-02-06 | End: 2018-02-07 | Stop reason: HOSPADM

## 2018-02-06 RX ORDER — ZOLPIDEM TARTRATE 5 MG/1
10 TABLET ORAL NIGHTLY PRN
Status: DISCONTINUED | OUTPATIENT
Start: 2018-02-06 | End: 2018-02-07 | Stop reason: HOSPADM

## 2018-02-06 RX ORDER — ZOLPIDEM TARTRATE 10 MG/1
10 TABLET ORAL NIGHTLY
Qty: 30 TABLET | Refills: 0 | Status: SHIPPED | OUTPATIENT
Start: 2018-02-06 | End: 2018-03-06 | Stop reason: SDUPTHER

## 2018-02-06 RX ORDER — MIRTAZAPINE 7.5 MG/1
7.5 TABLET, FILM COATED ORAL NIGHTLY
Status: DISCONTINUED | OUTPATIENT
Start: 2018-02-06 | End: 2018-02-07

## 2018-02-06 RX ORDER — SODIUM CHLORIDE 0.9 % (FLUSH) 0.9 %
5 SYRINGE (ML) INJECTION
Status: DISCONTINUED | OUTPATIENT
Start: 2018-02-06 | End: 2018-02-07 | Stop reason: HOSPADM

## 2018-02-06 RX ORDER — ONDANSETRON 2 MG/ML
4 INJECTION INTRAMUSCULAR; INTRAVENOUS EVERY 8 HOURS PRN
Status: DISCONTINUED | OUTPATIENT
Start: 2018-02-06 | End: 2018-02-07 | Stop reason: HOSPADM

## 2018-02-06 RX ORDER — PREDNISONE 10 MG/1
10 TABLET ORAL DAILY
Status: DISCONTINUED | OUTPATIENT
Start: 2018-02-06 | End: 2018-02-06

## 2018-02-06 RX ORDER — METRONIDAZOLE 500 MG/1
500 TABLET ORAL EVERY 8 HOURS
Status: ON HOLD | COMMUNITY
End: 2018-02-07 | Stop reason: HOSPADM

## 2018-02-06 RX ORDER — MULTIVITAMIN
1 TABLET ORAL DAILY
COMMUNITY

## 2018-02-06 RX ORDER — GLUCAGON 1 MG
1 KIT INJECTION
Status: DISCONTINUED | OUTPATIENT
Start: 2018-02-06 | End: 2018-02-07 | Stop reason: HOSPADM

## 2018-02-06 RX ORDER — BIOTIN 1000 MCG
1 TABLET,CHEWABLE ORAL DAILY
COMMUNITY
End: 2019-01-07

## 2018-02-06 RX ORDER — PROMETHAZINE HYDROCHLORIDE 25 MG/1
25 TABLET ORAL EVERY 6 HOURS PRN
Status: DISCONTINUED | OUTPATIENT
Start: 2018-02-06 | End: 2018-02-07 | Stop reason: HOSPADM

## 2018-02-06 RX ORDER — ACETAMINOPHEN 325 MG/1
650 TABLET ORAL EVERY 4 HOURS PRN
Status: DISCONTINUED | OUTPATIENT
Start: 2018-02-06 | End: 2018-02-07 | Stop reason: HOSPADM

## 2018-02-06 RX ORDER — ALPRAZOLAM 0.5 MG/1
0.5 TABLET ORAL 2 TIMES DAILY PRN
Status: DISCONTINUED | OUTPATIENT
Start: 2018-02-06 | End: 2018-02-07 | Stop reason: HOSPADM

## 2018-02-06 RX ORDER — POTASSIUM CHLORIDE 20 MEQ/1
40 TABLET, EXTENDED RELEASE ORAL ONCE
Status: COMPLETED | OUTPATIENT
Start: 2018-02-06 | End: 2018-02-06

## 2018-02-06 RX ORDER — ALPRAZOLAM 0.5 MG/1
TABLET ORAL
Qty: 60 TABLET | Refills: 0 | Status: SHIPPED | OUTPATIENT
Start: 2018-02-06 | End: 2018-02-19 | Stop reason: SDUPTHER

## 2018-02-06 RX ORDER — IBUPROFEN 200 MG
16 TABLET ORAL
Status: DISCONTINUED | OUTPATIENT
Start: 2018-02-06 | End: 2018-02-07 | Stop reason: HOSPADM

## 2018-02-06 RX ORDER — SODIUM CHLORIDE 9 MG/ML
INJECTION, SOLUTION INTRAVENOUS CONTINUOUS
Status: DISCONTINUED | OUTPATIENT
Start: 2018-02-06 | End: 2018-02-07 | Stop reason: HOSPADM

## 2018-02-06 RX ORDER — DIPHENOXYLATE HYDROCHLORIDE AND ATROPINE SULFATE 2.5; .025 MG/1; MG/1
1 TABLET ORAL 4 TIMES DAILY PRN
Status: DISCONTINUED | OUTPATIENT
Start: 2018-02-06 | End: 2018-02-07 | Stop reason: HOSPADM

## 2018-02-06 RX ADMIN — HYDROCORTISONE SODIUM SUCCINATE 100 MG: 100 INJECTION, POWDER, FOR SOLUTION INTRAMUSCULAR; INTRAVENOUS at 01:02

## 2018-02-06 RX ADMIN — PIPERACILLIN AND TAZOBACTAM 4.5 G: 4; .5 INJECTION, POWDER, LYOPHILIZED, FOR SOLUTION INTRAVENOUS; PARENTERAL at 12:02

## 2018-02-06 RX ADMIN — SODIUM CHLORIDE: 0.9 INJECTION, SOLUTION INTRAVENOUS at 02:02

## 2018-02-06 RX ADMIN — ONDANSETRON 4 MG: 2 INJECTION INTRAMUSCULAR; INTRAVENOUS at 12:02

## 2018-02-06 RX ADMIN — HYDROCORTISONE SODIUM SUCCINATE 100 MG: 100 INJECTION, POWDER, FOR SOLUTION INTRAMUSCULAR; INTRAVENOUS at 07:02

## 2018-02-06 RX ADMIN — DRONABINOL 2.5 MG: 2.5 CAPSULE ORAL at 07:02

## 2018-02-06 RX ADMIN — ONDANSETRON 4 MG: 2 INJECTION INTRAMUSCULAR; INTRAVENOUS at 06:02

## 2018-02-06 RX ADMIN — ERTAPENEM SODIUM 1 G: 1 INJECTION, POWDER, LYOPHILIZED, FOR SOLUTION INTRAMUSCULAR; INTRAVENOUS at 01:02

## 2018-02-06 RX ADMIN — LISINOPRIL 10 MG: 10 TABLET ORAL at 09:02

## 2018-02-06 RX ADMIN — GENTAMICIN SULFATE 238 MG: 40 INJECTION, SOLUTION INTRAMUSCULAR; INTRAVENOUS at 09:02

## 2018-02-06 RX ADMIN — OXYCODONE HYDROCHLORIDE AND ACETAMINOPHEN 1 TABLET: 10; 325 TABLET ORAL at 05:02

## 2018-02-06 RX ADMIN — ENOXAPARIN SODIUM 40 MG: 100 INJECTION SUBCUTANEOUS at 05:02

## 2018-02-06 RX ADMIN — IOHEXOL 75 ML: 350 INJECTION, SOLUTION INTRAVENOUS at 10:02

## 2018-02-06 RX ADMIN — PREDNISONE 10 MG: 10 TABLET ORAL at 01:02

## 2018-02-06 RX ADMIN — ALPRAZOLAM 0.5 MG: 0.5 TABLET ORAL at 02:02

## 2018-02-06 RX ADMIN — ONDANSETRON 4 MG: 2 INJECTION INTRAMUSCULAR; INTRAVENOUS at 10:02

## 2018-02-06 RX ADMIN — POTASSIUM CHLORIDE 40 MEQ: 1500 TABLET, EXTENDED RELEASE ORAL at 07:02

## 2018-02-06 RX ADMIN — OXYCODONE HYDROCHLORIDE AND ACETAMINOPHEN 2 TABLET: 5; 325 TABLET ORAL at 12:02

## 2018-02-06 RX ADMIN — OXYCODONE HYDROCHLORIDE AND ACETAMINOPHEN 1 TABLET: 10; 325 TABLET ORAL at 04:02

## 2018-02-06 RX ADMIN — FLUCONAZOLE 100 MG: 100 TABLET ORAL at 12:02

## 2018-02-06 RX ADMIN — SODIUM CHLORIDE 1000 ML: 0.9 INJECTION, SOLUTION INTRAVENOUS at 12:02

## 2018-02-06 RX ADMIN — ALPRAZOLAM 0.5 MG: 0.5 TABLET ORAL at 12:02

## 2018-02-06 RX ADMIN — OXYCODONE HYDROCHLORIDE AND ACETAMINOPHEN 1 TABLET: 10; 325 TABLET ORAL at 10:02

## 2018-02-06 NOTE — HPI
37F with Crohn's disease (diagnosed at age 10) s/p TAC + ileostomy in 2012 on 10 mg pred daily recently stopped stelara 01/24, anxiety, HTN who was recently discharged 02/05 from [facility] [place] for chest pain related to urosepsis (CTA PE negative, trop negative, tele showed NSR x48H). BCx were negative, pt was put on 3 day course of ceftriaxone; UCx resulted 02/05 showing ESBL E coli resistant to ceftriaxone. Bactrim DS BID x 3 days was called in for pt to take outpatient but pt states she is unable to tolerate PO medications due to nausea. Also states she has fatigue, palpitations, malodorous urine, and R flank pain (started 02/05). Denies hematuria, dysuria, CP, SOB, change in ostomy output, color, or odor.    In the ED pt was given 1L NS + Zosyn x1 (02/02 culture shows sensitivity to pip-tazo)    Admitted to Hospital Medicine for treatment / workup of ESBL UTI

## 2018-02-06 NOTE — ED TRIAGE NOTES
Pt d/c this AM after Chrohns Flair up and UTI. Pt was called as soon as she got home and told to return because she ahd positive Urine Culture. Pt c/o right flank  pain and thinks she is running fever.

## 2018-02-06 NOTE — CARE UPDATE
Plan:   1) Gentamicin switched to Ertapenem. Appreciate ID assistance.  2) Holding dronabinol, citalopram due to qtc prolongation. Started mirtazapine qhs  3) CT A/P to rule out nephrolithiasis and renal abscess     Lb Wright MD  Internal Medicine Prelim   Pager: [phone]

## 2018-02-06 NOTE — ED NOTES
Patient identifiers for Ivy Salgado checked and correct.  LOC: Patient is awake, alert, and aware of environment with an appropriate affect. Patient is oriented x 3 and speaking appropriately.  APPEARANCE: Patient resting comfortably and in no acute distress. Patient is clean and well groomed, patient's clothing is properly fastened.  SKIN: The skin is warm and dry. Patient has normal skin turgor and moist mucus membrances. Skin is intact; no bruising or breakdown noted.  MUSKULOSKELETAL: Patient is moving all extremities well, no obvious deformities noted. Pulses intact.   RESPIRATORY: Airway is open and patent. Respirations are spontaneous and non-labored with normal effort and rate.  CARDIAC: Patient has a normal rate and rhythm. No peripheral edema noted. Capillary refill < 3 seconds.  ABDOMEN: No distention noted. Bowel sounds active in all 4 quadrants. Soft and non-tender upon palpation.  NEUROLOGICAL: PERRL. Facial expression is symmetrical. Hand grasps are equal bilaterally. Normal sensation in all extremities when touched with finger.  Allergies reported:   Review of patient's allergies indicates:   Allergen Reactions    Vancomycin analogues Hives    Azathioprine sodium      Other reaction(s): pancreatitis  Other reaction(s): pancreatitis

## 2018-02-06 NOTE — ED PROVIDER NOTES
Encounter Date: 2/5/2018    SCRIBE #1 NOTE: I, Dony Watts, am scribing for, and in the presence of,  Milagros Haddad . I have scribed the entire note.       History     Chief Complaint   Patient presents with    Urinary Tract Infection      [facility] today told to come back by care team for esvl  and was told to come back to be admitted  - c/o sob , cp  since friday      Time patient was seen by the provider: 11:16 PM    The patient is a 37 y.o. female with co-morbidities including: Crohn's disease, HTN,, ileostomy, appendectomy, and partial hysterectomy who presents to the ED with a complaint of an UTI. Pt admitted on Friday for urosepsis and treated with IV rocephin. Pt was discharged earlier today with the incorrect antibiotic, then was prescribed bactrim by phone, but she unfortunately is unable to tolerate PO intake with this drug. She is now presenting back to the ED. Associated symptoms include: right flank pain, nausea, vomiting, fever, SOB, CP (with severe tachycardia), vaginal discharge, and right sided abdominal pain. Denies any cough, URI symptoms, blood in urine, and no change in bowel movements.         The history is provided by the patient and medical records.     Review of patient's allergies indicates:   Allergen Reactions    Vancomycin analogues Hives    Azathioprine sodium      Other reaction(s): pancreatitis  Other reaction(s): pancreatitis     Past Medical History:   Diagnosis Date    Abnormal Pap smear 2007    Abnormal Pap smear 5/26/2011    Anemia     Anxiety     Arthritis     C. difficile diarrhea     Crohn's disease     Depression 8/5/2017    Encounter for blood transfusion     Genital HSV     History of colposcopy with cervical biopsy 2007 and 7/2011 2007-LYLA I  and 7/2011- LYLA I    Hypertension     Kidney stone     Kidney stone     Recurrent UTI 4/3/2013    S/P ileostomy 7/9/2012    Sterilization 6/23/2012     Past Surgical History:   Procedure Laterality  Date    ABDOMINAL SURGERY      APPENDECTOMY      BLADDER SURGERY      partial cystectomy due to fistula    CKC      COLON SURGERY      COLONOSCOPY      HYSTERECTOMY  04/16/2015    SHELLIE    ILEOSTOMY      OOPHORECTOMY Right 04/16/2015    PORTACATH PLACEMENT      SKIN BIOPSY      SMALL INTESTINE SURGERY      TOTAL COLECTOMY      TUBAL LIGATION  06 06 2012    UPPER GASTROINTESTINAL ENDOSCOPY       Family History   Problem Relation Age of Onset    Colon cancer Father     Cancer Father      colon cancer    Hypertension Mother     Cancer Maternal Grandfather      esopghal     Skin cancer Maternal Grandfather     Endometrial cancer Maternal Aunt     Crohn's disease Brother     Breast cancer Neg Hx     Ovarian cancer Neg Hx      Social History   Substance Use Topics    Smoking status: Never Smoker    Smokeless tobacco: Never Used    Alcohol use 0.0 oz/week      Comment: Patient only drinks wine not regular basis.     Review of Systems   Constitutional: Positive for fever.   HENT: Negative for rhinorrhea and sore throat.    Respiratory: Positive for shortness of breath. Negative for cough.    Cardiovascular: Positive for chest pain.   Gastrointestinal: Positive for abdominal pain (Rightsided), nausea and vomiting.   Genitourinary: Positive for dysuria, flank pain (Right) and vaginal discharge. Negative for hematuria.        No bowel movement changes.    Musculoskeletal: Negative for back pain.   Skin: Negative for rash.   Neurological: Negative for weakness.   Hematological: Does not bruise/bleed easily.       Physical Exam     Initial Vitals [02/05/18 2002]   BP Pulse Resp Temp SpO2   (!) 155/95 106 18 99.4 °F (37.4 °C) 100 %      MAP       115         Physical Exam    Nursing note and vitals reviewed.  HENT:   Head: Normocephalic and atraumatic.   Eyes: EOM are normal. Pupils are equal, round, and reactive to light.   Neck: Neck supple.   Cardiovascular: Regular rhythm and intact distal pulses.    Tachycardic.    Pulmonary/Chest: Breath sounds normal. No respiratory distress.   Abdominal: Soft. There is no tenderness. There is no CVA tenderness.   Colostomy bag noted to RLQ with no surrounding erythema.    Neurological: She is alert and oriented to person, place, and time.   Skin: Skin is warm.         ED Course   Procedures  Labs Reviewed   CBC W/ AUTO DIFFERENTIAL - Abnormal; Notable for the following:        Result Value    WBC 13.44 (*)     RBC 3.65 (*)     Hemoglobin 11.1 (*)     Hematocrit 33.1 (*)     Gran # (ANC) 9.7 (*)     Immature Grans (Abs) 0.05 (*)     Mono # 1.2 (*)     All other components within normal limits   COMPREHENSIVE METABOLIC PANEL - Abnormal; Notable for the following:     Potassium 2.9 (*)     Calcium 8.6 (*)     Albumin 3.0 (*)     Alkaline Phosphatase 49 (*)     All other components within normal limits   CULTURE, URINE   URINALYSIS, REFLEX TO URINE CULTURE    Narrative:     Preferred Collection Type->Urine, Clean Catch   HEMOGLOBIN A1C   HEMOGLOBIN A1C    Narrative:     ADD ON TEST HEMOGLOBIN A1C PER DR SAUMYA RITCHIE ORDER #[id number]   02/06/2018  01:23              Medical Decision Making:   History:   Old Medical Records: I decided to obtain old medical records.  Old Records Summarized: records from previous admission(s).  Initial Assessment:   The patient is a 37 y.o. female with co-morbidities including: Crohn's disease, HTN,, ileostomy, appendectomy, and partial hysterectomy who presents to the ED with a complaint of an UTI.     Will get basic labs and blood cultures. Will admit to medicine.   Differential Diagnosis:   My initial differential diagnoses include but are not limited to: UTI, pyelonephritis, and urosepsis   Clinical Tests:   Lab Tests: Ordered and Reviewed  ED Management:  UA and Labs pending.     Patient will be started on IV Zosyn 4.5 mg, IV Zofran 4 mg and oral Percocet 10 mg.  She was admitted to inpatient team IM 3.     I have discussed the treatment and  management of this patient with my supervisory physician, and we agree on the plan of care.     I, Milagros Haddad, personally performed the services described in this documentation. All medical record entries made by the scribe were at my direction and in my presence.  I have reviewed the chart and agree that the record reflects my personal performance and is accurate and complete. Milagros Haddad, JOSY.  12:02 AM 2018              Scribe Attestation:   Scribe #1: I performed the above scribed service and the documentation accurately describes the services I performed. I attest to the accuracy of the note.    Attending Attestation:     Physician Attestation Statement for NP/PA:   I have conducted a face to face encounter with this patient in addition to the NP/PA, due to Medical Complexity    Other NP/PA Attestation Additions:      Medical Decision Makin-year-old female with history of Crohn's disease on immunotherapy presenting with ESBL.  Patient was informed of results after she was discharged and returned and she is unable tolerate Bactrim.  Patient will be admitted for further treatment and evaluation.       Physician Attestation for Scribe:      Comments: I, Dr. Judith Mahajan, personally performed the services described in this documentation. All medical record entries made by the scribe were at my direction and in my presence.  I have reviewed the chart and agree that the record reflects my personal performance and is accurate and complete. Judith Mahajan MD.              ED Course      Clinical Impression:   The primary encounter diagnosis was Cystisis due to ESBL-producing Escherichia coli. Diagnoses of Chest pain, Urinary tract infection without hematuria, site unspecified, QT prolongation, and UTI due to extended-spectrum beta lactamase (ESBL) producing Escherichia coli were also pertinent to this visit.    Disposition:   Disposition: Admitted  Condition: Stable                         Milagros Haddad PA-C  02/06/18 0004       Judith Mahajan MD  02/08/18 2995

## 2018-02-06 NOTE — ASSESSMENT & PLAN NOTE
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 Denies CP, SOB, increased ileostomy output  - UCx and blood cx drawn 02/06, consider CT contrast to assess for perinephric abscess given prolonged UTI with inadequate antibiotics  - Given 1L NS in ED, /hr started given decreased PO intake  - Gentamicin 5mg/kg daily x 3 days with troughs

## 2018-02-06 NOTE — HPI
"       Ms. Delacruz is a 37 year old with Crohn's disease (diagnosed at age 10) s/p TAC + ileostomy in 2012 and chronic, recurrent UTI.   She recently stopped stelara on 1/24 and is on 10 mg prednisone daily.  She was recent admitted to [facility] on 2/2 to 2/5 after presenting to ED with chest pain thought to be related to Urosepsis (CTA PE negative, troponin negative, EKG wnl). Her U/A was positive and she initially treated with 3 days of IV ceftriaxone.  Urine culture resulted after discharge, showing an ESBL E coli which is resistant to cefepime.  She was prescribed Bactrim DS X 3 days to take as an outpatient, but she is unable to tolerate the medication because of persistent nausea and she re-presented to ED.  Also complaining of fatigue, right flank pain, and increased liquid stool output.  She was admitted for treatment of ESBL UTI and further evaluation.      She is not having her usual urinary symptoms of pain/burning. Reports "low grade " fever, 99.5 to 100.0  She has nausea, but no vomiting.    She reports history of kidney stones and follows with Dr. Obrien, Urology, at [facility] for chronic UTI.   Per his notes she was last seen in 11/2017.  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   She reports her current flank pain is not like her prior kidney stone pain.      At time of visit she is not in acute distress.  She is afebrile.  WBC 13.44.   Hemodynamically stable.   Repeat U/A on 2/5 is clear.   Urine culture pending.  She received dose of gentamicin in the ED and has been started on Ertapenem.  Blood cultures pending.      "

## 2018-02-06 NOTE — PHARMACY MED REC
[facility] Medication Reconciliation  Template    Patient was admitted on 2/5/2018 for UTI due to extended-spectrum beta lactamase (ESBL) producing Escherichia coli.    Patient's prior to admission medication regimen was as follows:  Prescriptions Prior to Admission   Medication Sig Dispense Refill Last Dose    acetaminophen (TYLENOL) 650 MG TbSR Take 650 mg by mouth daily as needed (fever/pain).   More than a month at Unknown time    biotin 1,000 mcg Chew Take 1 tablet by mouth once daily.       citalopram (CELEXA) 20 MG tablet Take 20 mg by mouth once daily.       diphenoxylate-atropine 2.5-0.025 mg (LOMOTIL) 2.5-0.025 mg per tablet TAKE 1 TABLET BY MOUTH 3 TIMES A DAY AS NEEDED FOR DIARRHEA 90 tablet 0 Past Week at Unknown time    dronabinol (MARINOL) 2.5 MG capsule Take 1 capsule (2.5 mg total) by mouth 2 (two) times daily before meals. 60 capsule 3 2/3/2018 at Unknown time    IBUPROFEN ORAL Take 400-600 mg by mouth daily as needed (fever/pain).   More than a month at Unknown time    lisinopril 10 MG tablet TAKE 1 TABLET (10 MG TOTAL) BY MOUTH ONCE DAILY. 90 tablet 2 2/2/2018 at Unknown time    loperamide (IMODIUM) 2 mg capsule Take 2 mg by mouth daily as needed for Diarrhea.   2/2/2018 at Unknown time    metroNIDAZOLE (FLAGYL) 500 MG tablet Take 500 mg by mouth every 8 (eight) hours.       multivitamin (ONE DAILY MULTIVITAMIN) per tablet Take 1 tablet by mouth once daily.       ondansetron (ZOFRAN-ODT) 8 MG TbDL TAKE 1 TABLET (8 MG TOTAL) BY MOUTH EVERY 8 (EIGHT) HOURS AS NEEDED (NAUSEA). 30 tablet 0 2/2/2018 at Unknown time    oxyCODONE-acetaminophen (PERCOCET)  mg per tablet Take 1 tablet by mouth every 6 (six) hours as needed for Pain. 40 tablet 0 Past Week at Unknown time    predniSONE (DELTASONE) 10 MG tablet Take 1 tablet (10 mg total) by mouth once daily. 60 tablet 0     promethazine (PHENERGAN) 25 MG tablet TAKE 1 TABLET BY MOUTH EVERY 6 HOURS AS NEEDED FOR NAUSEA 30 tablet 3 Past  "Week    sumatriptan (IMITREX) 100 MG tablet TAKE 1 TABLET BY MOUTH EVERY 2 HOURS AS NEEDED FOR MIGRAINE. DO NOT EXCEED 2 DOSES/ 24HRS 9 tablet 1 Past Month at Unknown time    tacrolimus (PROTOPIC) 0.1 % ointment Apply 1 application topically 2 (two) times daily as needed (for rash on face).   4 Past Month at Unknown time     Please add appropriate    SmartPhrase below:   Admission Medication Reconciliation - Pharmacy Consult Note    The home medication history was taken by Rebecca Jara pharmacy technician. Based on information gathered and subsequent review by the clinical pharmacist, the items below may need attention.     You may go to "Admission" then "Reconcile Home Medications" tabs to review and/or act upon these items. Based on information gathered and subsequent review by the clinical pharmacist, the items below may need attention.    Potentially problematic discrepancies with current MAR  o Patient IS taking the following which was not ordered upon admit  o Citalopram 20 mg QD (held)   o Dronabinol 2.5 mg BID before meals (held)   o Imitrex 100 mg Q 2 hr PRN migraine (max 2 doses per 24 hr)     Potential issues to be addressed PRIOR TO DISCHARGE  o Therapy modification- patient with prolonged QTc. On multiple QTc prolonging medications (Citalopram, Zofran)- consider changing to a different antidepressant on discharge    Please address this information as you see fit.  Feel free to contact us if you have any questions or require assistance.    Tali Hawkins  EXT [phone]        "

## 2018-02-06 NOTE — ASSESSMENT & PLAN NOTE
Evaluation of recurrent chest pain/tightness revealed EKGs consistent with sinus tachycardia and NSR with normal rate w/o any ischemic ST or T wave abnormalities. TTE on 2/3/18 showed normal LVEF, normal RVSF with no structural abnormalities. Tn negative x2. Telemetry shows sinus jose elias during night time and occasional elevation in HR to 110s (sinus). Rate increased gradually without any PAC/PVC initiating it. She reported 1 episode similar to this 1 week ago which prompted her to go to the ED and was noted to have sinus tachycardia which resolved with IV fluids. CTA was negative for PE.  Cardiology was consulted prior to her discharge; per their evaluation, she had a normal holter monitor previously and no evidence of any cadiac ischemia.  Per their evaluation, most likely underlying cause related to GI pathology; as above, patient restarted on prednisone 10 mg po qd prior to discharge and will follow up with GI as outpatient & cardiology clinic as well.

## 2018-02-06 NOTE — ASSESSMENT & PLAN NOTE
Random cortisol during admission <0.1.  Patient treated with stress dose steroids during admission w/ resumption of prednisone 10 mg po qd at discharge.

## 2018-02-06 NOTE — HOSPITAL COURSE
Patient admitted to IM3 for  urosepsis.  Patient treated w/ 3d course of Rocephin during admission w/ improved leukocytosis.  BCx showed NGTD after admission, but after completing 3d course of rocephin & discharged, urine culture resulted w/ ESBL E coli.  She was contacted and a three day course of Bactrim DS was sent in to her pharmacy.

## 2018-02-06 NOTE — SUBJECTIVE & OBJECTIVE
Interval History:  Overnight patient had recurrent episode of palpitations associated with chest pain.  No ischemic changes noted on EKG & sinus tach noted on telemetry.  Patient safe for discharge today w/ cardiology follow up in two weeks for palpitations.    Review of Systems   Constitutional: Negative for chills and fever.   Respiratory: Positive for chest tightness. Negative for cough and shortness of breath.    Cardiovascular: Positive for chest pain and palpitations.   Gastrointestinal: Negative for constipation, diarrhea, nausea and vomiting.     Objective:     Vital Signs (Most Recent):  Temp: 98.3 °F (36.8 °C) (02/05/18 1531)  Pulse: 70 (02/05/18 1531)  Resp: 18 (02/05/18 1531)  BP: 131/76 (02/05/18 1531)  SpO2: 98 % (02/05/18 1531) Vital Signs (24h Range):  Temp:  [98.3 °F (36.8 °C)-99.4 °F (37.4 °C)] 99.4 °F (37.4 °C)  Pulse:  [] 106  Resp:  [16-18] 18  SpO2:  [97 %-100 %] 100 %  BP: (129-155)/(74-95) 155/95     Weight: 48.8 kg (107 lb 10.4 oz)  Body mass index is 20.34 kg/m².    Intake/Output Summary (Last 24 hours) at 02/05/18 2223  Last data filed at 02/05/18 1615   Gross per 24 hour   Intake             1740 ml   Output              750 ml   Net              990 ml      Physical Exam   Constitutional: She is oriented to person, place, and time. No distress.   Anxious young white female sitting in hospital bed in mild distress   Cardiovascular: Normal rate, regular rhythm and intact distal pulses.  Exam reveals no gallop and no friction rub.    No murmur heard.  Pulmonary/Chest: Breath sounds normal. No respiratory distress. She has no wheezes. She has no rales.   Abdominal: Soft. Bowel sounds are normal. She exhibits no distension. There is no tenderness.   Neurological: She is alert and oriented to person, place, and time.       Significant Labs:   CBC:   Recent Labs  Lab 02/04/18  0554 02/05/18  0441   WBC 9.15 9.69   HGB 10.6* 11.3*   HCT 31.3* 33.2*    290     CMP:   Recent Labs  Lab  02/04/18  0554 02/05/18  0441    142   K 3.7 3.8    107   CO2 26 27   * 118*   BUN 6 7   CREATININE 0.6 0.7   CALCIUM 8.2* 8.7   PROT 5.9* 6.2   ALBUMIN 2.8* 2.8*   BILITOT 0.1 0.2   ALKPHOS 50* 48*   AST 14 10   ALT 12 11   ANIONGAP 6* 8   EGFRNONAA >60.0 >60.0     Magnesium:   Recent Labs  Lab 02/04/18  0554 02/05/18  0441   MG 2.0 1.6       Significant Imaging: EKG: I have reviewed all pertinent results/findings within the past 24 hours and my personal findings are: NSR w/ rate of 83 BPM

## 2018-02-06 NOTE — ASSESSMENT & PLAN NOTE
Pt on pred 10 PO daily, has been on and off prednisone since age 12, cortisol level low on last admit; given stress dose steroids  - Will restart hydrocortisone 100 TID

## 2018-02-06 NOTE — DISCHARGE SUMMARY
"[facility] Hospital Medicine  Discharge Summary      Patient Name: Ivy Salgado  MRN: [id number]  Admission Date: 2/2/2018  Hospital Length of Stay: 2 days  Discharge Date and Time: 2/5/2018  5:07 PM  Attending Physician: NICOLÁS SOLIS MD  Discharging Provider: Antwon Rizvi MD  Primary Care Provider: Kalia Astorga MD  Hospital Medicine Team: [facility] HOSP MED 3 Antwon Rizvi MD    HPI:   37F with Crohn's disease (diagnosed at age 10) s/p TAC + ileostomy in 2012 on 5mg pred QOD recently stopped stelara, anxiety, HTN who presents to the ED for chest pain and SOB. Pt says that she was sitting on her couch watching TV when she suddenly developed chest pain, shortness of breath, nausea, and palpitations. She called 911 because she felt like she "was going to pass out." She says that this is identical to her episode 1 week prior during which she was worked up for PE (CTA negative).     In the ED pt was given 2L NS and critical care was consulted for pt's initial lactate of 4.9, which decreased to 0.7. UA showed bacteria, >100 WBCs, nitrites, and leukocyte esterase; pt denied dysuria, hematuria, or increase urinary frequency. Pt says she felt better after fluids and was given cefepime x1 in the ED for empiric coverage of UTI. Pt has had ESBL in the past (last positive culture 07/2017) but subsequent urine cultures were negative and pt does not have any indwelling catheter, stents, or nephrostomy tubes. Denies syncope, headache, vision changes. Denies recent fever, chills, n/v, diarrhea/constipation (however, pt says that she has had increased ostomy output and been dehydrated for approx 1 year, when her last CD flare began).    Pt was admitted to Hospital Medicine for further monitoring and treatment of urosepsis.    * No surgery found *      Hospital Course:   Patient admitted to 3 for  urosepsis.  Patient treated w/ 3d course of Rocephin during admission w/ improved leukocytosis.  BCx " showed NGTD after admission, but after completing 3d course of rocephin & discharged, urine culture resulted w/ ESBL E coli.  She was contacted and a three day course of Bactrim DS BID was sent in to her pharmacy.     Consults:   Consults         Status Ordering Provider     Inpatient consult to Cardiology  Once     Provider:  (Not yet assigned)    Completed ISHAAN COLBY AUGUST     Inpatient consult to Critical Care Medicine  Once     Provider:  (Not yet assigned)    Completed ASHELY LLANOS        Interval History:  Overnight patient had recurrent episode of palpitations associated with chest pain.  No ischemic changes noted on EKG & sinus tach noted on telemetry.  Patient safe for discharge today w/ cardiology follow up in two weeks for palpitations.     Review of Systems   Constitutional: Negative for chills and fever.   Respiratory: Positive for chest tightness. Negative for cough and shortness of breath.    Cardiovascular: Positive for chest pain and palpitations.   Gastrointestinal: Negative for constipation, diarrhea, nausea and vomiting.      Objective:      Vital Signs (Most Recent):  Temp: 98.3 °F (36.8 °C) (02/05/18 1531)  Pulse: 70 (02/05/18 1531)  Resp: 18 (02/05/18 1531)  BP: 131/76 (02/05/18 1531)  SpO2: 98 % (02/05/18 1531) Vital Signs (24h Range):  Temp:  [98.3 °F (36.8 °C)-99.4 °F (37.4 °C)] 99.4 °F (37.4 °C)  Pulse:  [] 106  Resp:  [16-18] 18  SpO2:  [97 %-100 %] 100 %  BP: (129-155)/(74-95) 155/95      Weight: 48.8 kg (107 lb 10.4 oz)  Body mass index is 20.34 kg/m².     Intake/Output Summary (Last 24 hours) at 02/05/18 2223  Last data filed at 02/05/18 1615    Gross per 24 hour   Intake             1740 ml   Output              750 ml   Net              990 ml      Physical Exam   Constitutional: She is oriented to person, place, and time. No distress.   Anxious young white female sitting in hospital bed in mild distress   Cardiovascular: Normal rate, regular rhythm and intact distal  pulses.  Exam reveals no gallop and no friction rub.    No murmur heard.  Pulmonary/Chest: Breath sounds normal. No respiratory distress. She has no wheezes. She has no rales.   Abdominal: Soft. Bowel sounds are normal. She exhibits no distension. There is no tenderness.   Neurological: She is alert and oriented to person, place, and time.         Significant Labs:   CBC:   Recent Labs  Lab 02/04/18  0554 02/05/18  0441   WBC 9.15 9.69   HGB 10.6* 11.3*   HCT 31.3* 33.2*    290      CMP:   Recent Labs  Lab 02/04/18  0554 02/05/18  0441    142   K 3.7 3.8    107   CO2 26 27   * 118*   BUN 6 7   CREATININE 0.6 0.7   CALCIUM 8.2* 8.7   PROT 5.9* 6.2   ALBUMIN 2.8* 2.8*   BILITOT 0.1 0.2   ALKPHOS 50* 48*   AST 14 10   ALT 12 11   ANIONGAP 6* 8   EGFRNONAA >60.0 >60.0      Magnesium:   Recent Labs  Lab 02/04/18  0554 02/05/18  0441   MG 2.0 1.6         Significant Imaging: EKG: I have reviewed all pertinent results/findings within the past 24 hours and my personal findings are: NSR w/ rate of 83 BPM      * Sepsis secondary to UTI    As above in hospital course.        Crohn's disease of small intestine with complication    No acute events during admission.  Patient's prednisone dose increased to 10 mg po qd from 5 mg po QOD as she was taking at home.  She will follow up with GI after discharge.        Other chest pain    Evaluation of recurrent chest pain/tightness revealed EKGs consistent with sinus tachycardia and NSR with normal rate w/o any ischemic ST or T wave abnormalities. TTE on 2/3/18 showed normal LVEF, normal RVSF with no structural abnormalities. Tn negative x2. Telemetry shows sinus jose elias during night time and occasional elevation in HR to 110s (sinus). Rate increased gradually without any PAC/PVC initiating it. She reported 1 episode similar to this 1 week ago which prompted her to go to the ED and was noted to have sinus tachycardia which resolved with IV fluids. CTA was  negative for PE.  Cardiology was consulted prior to her discharge; per their evaluation, she had a normal holter monitor previously and no evidence of any cadiac ischemia.  Per their evaluation, most likely underlying cause related to GI pathology; as above, patient restarted on prednisone 10 mg po qd prior to discharge and will follow up with GI as outpatient & cardiology clinic as well.        Generalized anxiety disorder    Continue alprazolam 0.5 mg PO BID PRN at discharge.        Current chronic use of systemic steroids    Random cortisol during admission <0.1.  Patient treated with stress dose steroids during admission w/ resumption of prednisone 10 mg po qd at discharge.          Final Active Diagnoses:    Diagnosis Date Noted POA    PRINCIPAL PROBLEM:  Sepsis secondary to UTI [A41.9, N39.0] 02/03/2018 Yes    Crohn's disease of small intestine with complication [K50.019] 02/21/2017 Yes    Other chest pain [R07.89] 02/03/2018 Yes    Generalized anxiety disorder [F41.1] 09/18/2013 Yes    Current chronic use of systemic steroids [Z79.52] 02/03/2018 Not Applicable    Acute cystitis without hematuria [N30.00] 02/03/2018 Yes    Atrial tachycardia [I47.1] 02/03/2018 Yes      Problems Resolved During this Admission:    Diagnosis Date Noted Date Resolved POA    Lactic acid acidosis [E87.2] 02/02/2018 02/04/2018 Yes       Discharged Condition: good    Disposition: Home or Self Care    Follow Up:  Follow-up Information     Kalia Astorga MD On 2/27/2018.    Specialty:  Internal Medicine  Why:  Hospitalization follow up - 6:40am  Contact information:  [address]  [facility] [address]  [phone]                 Patient Instructions:     Ambulatory Referral to Cardiology   Referral Priority: Routine Referral Type: Consultation   Referral Reason: Specialty Services Required    Requested Specialty: Cardiology    Number of Visits Requested: 1      Activity as tolerated     Notify your health care  provider if you experience any of the following:  temperature >100.4     Notify your health care provider if you experience any of the following:  persistent nausea and vomiting or diarrhea         Significant Diagnostic Studies: Microbiology:   Urine Culture    Lab Results   Component Value Date    LABURIN ESCHERICHIA COLI ESBL  >100,000 cfu/ml   02/02/2018       Pending Diagnostic Studies:     None         Medications:  Reconciled Home Medications:   Discharge Medication List as of 2/5/2018  2:56 PM      START taking these medications    Details   ergocalciferol (ERGOCALCIFEROL) 50,000 unit Cap Take 1 capsule (50,000 Units total) by mouth every 7 days., Starting Mon 2/12/2018, Normal         CONTINUE these medications which have CHANGED    Details   predniSONE (DELTASONE) 10 MG tablet Take 1 tablet (10 mg total) by mouth once daily., Starting Mon 2/5/2018, Until Thu 2/15/2018, Normal         CONTINUE these medications which have NOT CHANGED    Details   acetaminophen (TYLENOL) 650 MG TbSR Take 650 mg by mouth daily as needed (fever/pain)., Historical Med      ALPRAZolam (XANAX) 0.5 MG tablet TAKE 1 TABLET BY MOUTH TWICE A DAY, Normal      diphenoxylate-atropine 2.5-0.025 mg (LOMOTIL) 2.5-0.025 mg per tablet TAKE 1 TABLET BY MOUTH 3 TIMES A DAY AS NEEDED FOR DIARRHEA, Print      dronabinol (MARINOL) 2.5 MG capsule Take 1 capsule (2.5 mg total) by mouth 2 (two) times daily before meals., Starting Tue 1/2/2018, Print      IBUPROFEN ORAL Take 400-600 mg by mouth daily as needed (fever/pain)., Historical Med      lisinopril 10 MG tablet TAKE 1 TABLET (10 MG TOTAL) BY MOUTH ONCE DAILY., Normal      loperamide (IMODIUM) 2 mg capsule Take 2 mg by mouth daily as needed for Diarrhea., Historical Med      ondansetron (ZOFRAN-ODT) 8 MG TbDL TAKE 1 TABLET (8 MG TOTAL) BY MOUTH EVERY 8 (EIGHT) HOURS AS NEEDED (NAUSEA)., Starting Mon 1/8/2018, Normal      oxyCODONE-acetaminophen (PERCOCET)  mg per tablet Take 1 tablet by  mouth every 6 (six) hours as needed for Pain., Starting Tue 1/9/2018, Print      potassium citrate 15 mEq TbSR Take 2 tablets by mouth 2 (two) times daily., Starting Thu 11/30/2017, Until Fri 11/30/2018, Normal      promethazine (PHENERGAN) 25 MG tablet TAKE 1 TABLET BY MOUTH EVERY 6 HOURS AS NEEDED FOR NAUSEA, Normal      sumatriptan (IMITREX) 100 MG tablet TAKE 1 TABLET BY MOUTH EVERY 2 HOURS AS NEEDED FOR MIGRAINE. DO NOT EXCEED 2 DOSES/ 24HRS, Normal      tacrolimus (PROTOPIC) 0.1 % ointment Apply 1 application topically 2 (two) times daily as needed (for rash on face). , Starting Wed 9/6/2017, Historical Med      zolpidem (AMBIEN) 10 mg Tab TAKE 1 TABLET (10 MG TOTAL) BY MOUTH EVERY EVENING., Starting Tue 1/9/2018, Normal         STOP taking these medications       citalopram (CELEXA) 20 MG tablet Comments:   Reason for Stopping:         metroNIDAZOLE (FLAGYL) 500 MG tablet Comments:   Reason for Stopping:               Indwelling Lines/Drains at time of discharge:   Lines/Drains/Airways     Central Venous Catheter Line                 Port A Cath Single Lumen left subclavian -- days          Drain                 Ileostomy RUQ -- days                Time spent on the discharge of patient: 45  minutes  Patient was seen and examined on the date of discharge and determined to be suitable for discharge.         Antwon Rizvi MD  Department of Hospital Medicine  [facility]

## 2018-02-06 NOTE — PLAN OF CARE
Problem: Patient Care Overview  Goal: Plan of Care Review  Outcome: Ongoing (interventions implemented as appropriate)  Pt remains free of falls/ injury/ trauma. Amb in room independently. AAOx4 throughout shift. VSS. C/o chest pain: MD notified, VSS, ekg ordered, cardiology consulted, prev troponin w/u negative. 2 episodes of nausea, controlled by PRN medication. RLQ ileostomy intact, self- care per pt. IVF and bolus admin per ordered. Port a cath deaccessed prior to discharge. No other events at this time.    1650: (time approx) IM3 notified of ESBL found in urine.

## 2018-02-06 NOTE — CONSULTS
[facility]  Infectious Disease  Consult Note    Patient Name: Ivy Salgado  MRN: [id number]  Admission Date: 2/5/2018  Hospital Length of Stay: 1 days  Attending Physician: Jonas Allan MD  Primary Care Provider: Kalia Astorga MD     Isolation Status: Contact    Inpatient consult to Infectious Diseases  Consult performed by: MIRI REAL  Consult ordered by: LIZZIE MISHRA consult received. Chart being reviewed. Full note with recommendations to follow.    Thank you,  Miri Real PA-C  Pgr [phone]

## 2018-02-06 NOTE — SUBJECTIVE & OBJECTIVE
Past Medical History:   Diagnosis Date    Abnormal Pap smear 2007    Abnormal Pap smear 5/26/2011    Anemia     Anxiety     Arthritis     C. difficile diarrhea     Crohn's disease     Depression 8/5/2017    Encounter for blood transfusion     Genital HSV     History of colposcopy with cervical biopsy 2007 and 7/2011 2007-LYLA I  and 7/2011- LYLA I    Hypertension     Kidney stone     Kidney stone     Recurrent UTI 4/3/2013    S/P ileostomy 7/9/2012    Sterilization 6/23/2012       Past Surgical History:   Procedure Laterality Date    ABDOMINAL SURGERY      APPENDECTOMY      BLADDER SURGERY      partial cystectomy due to fistula    CKC      COLON SURGERY      COLONOSCOPY      HYSTERECTOMY  04/16/2015    SHELLIE    ILEOSTOMY      OOPHORECTOMY Right 04/16/2015    PORTACATH PLACEMENT      SKIN BIOPSY      SMALL INTESTINE SURGERY      TOTAL COLECTOMY      TUBAL LIGATION  06 06 2012    UPPER GASTROINTESTINAL ENDOSCOPY         Review of patient's allergies indicates:   Allergen Reactions    Vancomycin analogues Hives    Azathioprine sodium      Other reaction(s): pancreatitis  Other reaction(s): pancreatitis       Medications:  Prescriptions Prior to Admission   Medication Sig    acetaminophen (TYLENOL) 650 MG TbSR Take 650 mg by mouth daily as needed (fever/pain).    biotin 1,000 mcg Chew Take 1 tablet by mouth once daily.    citalopram (CELEXA) 20 MG tablet Take 20 mg by mouth once daily.    diphenoxylate-atropine 2.5-0.025 mg (LOMOTIL) 2.5-0.025 mg per tablet TAKE 1 TABLET BY MOUTH 3 TIMES A DAY AS NEEDED FOR DIARRHEA    dronabinol (MARINOL) 2.5 MG capsule Take 1 capsule (2.5 mg total) by mouth 2 (two) times daily before meals.    IBUPROFEN ORAL Take 400-600 mg by mouth daily as needed (fever/pain).    lisinopril 10 MG tablet TAKE 1 TABLET (10 MG TOTAL) BY MOUTH ONCE DAILY.    loperamide (IMODIUM) 2 mg capsule Take 2 mg by mouth daily as needed for Diarrhea.    metroNIDAZOLE  (FLAGYL) 500 MG tablet Take 500 mg by mouth every 8 (eight) hours.    multivitamin (ONE DAILY MULTIVITAMIN) per tablet Take 1 tablet by mouth once daily.    ondansetron (ZOFRAN-ODT) 8 MG TbDL TAKE 1 TABLET (8 MG TOTAL) BY MOUTH EVERY 8 (EIGHT) HOURS AS NEEDED (NAUSEA).    oxyCODONE-acetaminophen (PERCOCET)  mg per tablet Take 1 tablet by mouth every 6 (six) hours as needed for Pain.    predniSONE (DELTASONE) 10 MG tablet Take 1 tablet (10 mg total) by mouth once daily.    promethazine (PHENERGAN) 25 MG tablet TAKE 1 TABLET BY MOUTH EVERY 6 HOURS AS NEEDED FOR NAUSEA    sumatriptan (IMITREX) 100 MG tablet TAKE 1 TABLET BY MOUTH EVERY 2 HOURS AS NEEDED FOR MIGRAINE. DO NOT EXCEED 2 DOSES/ 24HRS    tacrolimus (PROTOPIC) 0.1 % ointment Apply 1 application topically 2 (two) times daily as needed (for rash on face).      Antibiotics     Start     Stop Route Frequency Ordered    02/06/18 1050  ertapenem (INVANZ) 1 g in sodium chloride 0.9 % 100 mL IVPB (ready to mix system)      -- IV Every 24 hours (non-standard times) 02/06/18 1050        Antifungals     None        Antivirals     None           Immunization History   Administered Date(s) Administered    Influenza 09/18/2013    Influenza - Quadrivalent - PF 10/04/2016, 11/17/2017    Influenza Split 09/18/2013    Pneumococcal Conjugate - 13 Valent 06/28/2017    Pneumococcal Polysaccharide - 23 Valent 08/19/2015    Td (ADULT) 06/28/2013       Family History     Problem Relation (Age of Onset)    Cancer Father, Maternal Grandfather    Colon cancer Father    Crohn's disease Brother    Endometrial cancer Maternal Aunt    Hypertension Mother    Skin cancer Maternal Grandfather        Social History     Social History    Marital status: Single     Spouse name: N/A    Number of children: N/A    Years of education: N/A     Occupational History     [employer]     Social History Main Topics    Smoking status: Never Smoker    Smokeless  tobacco: Never Used    Alcohol use 0.0 oz/week      Comment: Patient only drinks wine not regular basis.    Drug use: No    Sexual activity: Yes     Partners: Male     Birth control/ protection: Pill, Surgical, Condom      Comment: HYST     Other Topics Concern    None     Social History Narrative    None     Review of Systems   Constitutional: Positive for activity change, appetite change and fever. Negative for chills.   HENT: Negative.    Respiratory: Negative for cough, chest tightness, shortness of breath and wheezing.    Cardiovascular: Positive for palpitations. Negative for chest pain and leg swelling.   Gastrointestinal: Positive for diarrhea and nausea. Negative for abdominal pain, constipation and vomiting.        Ostomy with increased output   Genitourinary: Positive for flank pain (right ). Negative for decreased urine volume, difficulty urinating, dysuria and hematuria.   Musculoskeletal: Negative for arthralgias and myalgias.   Skin: Negative for rash.   Neurological: Negative for dizziness, weakness and headaches.   Hematological: Negative for adenopathy.   Psychiatric/Behavioral: Negative for agitation.     Objective:     Vital Signs (Most Recent):  Temp: 97.9 °F (36.6 °C) (02/06/18 1609)  Pulse: 68 (02/06/18 1609)  Resp: 18 (02/06/18 1609)  BP: 130/79 (02/06/18 1609)  SpO2: 98 % (02/06/18 1609) Vital Signs (24h Range):  Temp:  [96.3 °F (35.7 °C)-99.4 °F (37.4 °C)] 97.9 °F (36.6 °C)  Pulse:  [] 68  Resp:  [18] 18  SpO2:  [98 %-100 %] 98 %  BP: (123-183)/(75-96) 130/79     Weight: 47.4 kg (104 lb 8 oz)  Body mass index is 19.75 kg/m².    Estimated Creatinine Clearance: 97.9 mL/min (based on SCr of 0.6 mg/dL).    Physical Exam   Constitutional: She is oriented to person, place, and time. She appears well-developed and well-nourished. No distress.   Eyes: Conjunctivae are normal. No scleral icterus.   Neck: Normal range of motion. Neck supple.   Cardiovascular: Normal rate, regular rhythm  and normal heart sounds.    No murmur heard.  Pulmonary/Chest: Effort normal and breath sounds normal. No respiratory distress.   Abdominal: Soft. There is no tenderness.   Ostomy site clear     Musculoskeletal: She exhibits no edema.   Right flank pain   Neurological: She is alert and oriented to person, place, and time.   Skin: Skin is warm and dry. No rash noted. She is not diaphoretic.   Psychiatric: She has a normal mood and affect. Her behavior is normal.   Vitals reviewed.      Significant Labs:   Blood Culture:   Recent Labs  Lab 11/20/17  2335 02/02/18  2206 02/02/18  2213 02/06/18  0107 02/06/18  0117   LABBLOO No growth after 5 days.  No growth after 5 days. No Growth to date  No Growth to date  No Growth to date  No Growth to date No Growth to date  No Growth to date  No Growth to date  No Growth to date No Growth to date No Growth to date     CBC:   Recent Labs  Lab 02/05/18  0441 02/06/18  0001   WBC 9.69 13.44*   HGB 11.3* 11.1*   HCT 33.2* 33.1*    315     CMP:   Recent Labs  Lab 02/05/18  0441 02/06/18  0001    141   K 3.8 2.9*    106   CO2 27 26   * 94   BUN 7 6   CREATININE 0.7 0.6   CALCIUM 8.7 8.6*   PROT 6.2 6.5   ALBUMIN 2.8* 3.0*   BILITOT 0.2 0.2   ALKPHOS 48* 49*   AST 10 35   ALT 11 30   ANIONGAP 8 9   EGFRNONAA >60.0 >60.0     Urine Culture:   Recent Labs  Lab 08/22/17  1104 10/16/17  1107 10/23/17  1317 02/02/18  2258   LABURIN No growth ESCHERICHIA COLI ESBL>100,000 cfu/ml No growth ESCHERICHIA COLI ESBL>100,000 cfu/ml     Urine Studies:   Recent Labs  Lab 10/16/17  1108  02/02/18  2258 02/05/18  2334   COLORU Yellow  < > Yellow Straw   APPEARANCEUA Cloudy*  < > Hazy* Clear   PHUR 6.0  < > 5.0 7.0   SPECGRAV 1.025  < > 1.020 1.010   PROTEINUA 2+*  < > Negative Negative   GLUCUA Negative  < > Negative Negative   KETONESU Negative  < > Negative Negative   BILIRUBINUA Negative  < > Negative Negative   OCCULTUA 2+*  < > Negative Negative   NITRITE Positive*   < > Positive* Negative   UROBILINOGEN Negative  < > Negative Negative   LEUKOCYTESUR 2+*  < > 2+* Negative   RBCUA 11*  < > 2  --    WBCUA >100*  < > >100*  --    BACTERIA Many*  < > Many*  --    SQUAMEPITHEL 2  < > 1  --    HYALINECASTS 0  --   --   --    < > = values in this interval not displayed.    Significant Imaging: I have reviewed all pertinent imaging results/findings within the past 24 hours.

## 2018-02-06 NOTE — ASSESSMENT & PLAN NOTE
No acute events during admission.  Patient's prednisone dose increased to 10 mg po qd from 5 mg po QOD as she was taking at home.  She will follow up with GI after discharge.

## 2018-02-06 NOTE — H&P
[facility] Hospital Medicine  History & Physical    Patient Name: Ivy Salgado  MRN: [id number]  Admission Date: 2/5/2018  Attending Physician: Flaquita Miller MD   Primary Care Provider: Kalia Astorga MD    Hospital Medicine Team: [facility] MED 3 Shila Sanz MD     Patient information was obtained from patient, past medical records and ER records.     Subjective:     Principal Problem:UTI due to extended-spectrum beta lactamase (ESBL) producing Escherichia coli    Chief Complaint:   Chief Complaint   Patient presents with    Urinary Tract Infection      d/c from [facility] today told to come back by care team for esvl  and was told to come back to be admitted  - c/o sob , cp  since friday         HPI: 37F with Crohn's disease (diagnosed at age 10) s/p TAC + ileostomy in 2012 on 10 mg pred daily recently stopped stelara 01/24, anxiety, HTN who was recently discharged 02/05 from [facility] for chest pain related to urosepsis (CTA PE negative, trop negative, tele showed NSR x48H). BCx were negative, pt was put on 3 day course of ceftriaxone; UCx resulted 02/05 showing ESBL E coli resistant to ceftriaxone. Bactrim DS BID x 3 days was called in for pt to take outpatient but pt states she is unable to tolerate PO medications due to nausea. Also states she has fatigue, palpitations, malodorous urine, and R flank pain (started 02/05). Denies hematuria, dysuria, CP, SOB, change in ostomy output, color, or odor.    In the ED pt was given 1L NS + Zosyn x1 (02/02 culture shows sensitivity to pip-tazo)    Admitted to Hospital Medicine for treatment / workup of ESBL UTI    Interval History: Yesterday evening one episode of palpitations, shaking. EKG showed sinus tach with nonspecific T wave abnormalities. This morning, she reported doing well and otherwise had no complaints. Plan: continue antibiotics for GNR in urine culture.    Review of Systems   Constitutional: Positive for activity change and appetite change.  Negative for diaphoresis, fatigue and fever.   Respiratory: Negative for cough, chest tightness, shortness of breath, wheezing and stridor.    Cardiovascular: Positive for palpitations. Negative for chest pain and leg swelling.   Gastrointestinal: Positive for nausea. Negative for abdominal distention, abdominal pain, constipation, diarrhea and vomiting.   Genitourinary: Positive for flank pain. Negative for dysuria, frequency and hematuria.        +malodorous urine   Musculoskeletal: Negative for arthralgias, back pain, gait problem, joint swelling, myalgias, neck pain and neck stiffness.   Neurological: Negative for seizures, syncope, facial asymmetry, light-headedness and headaches.     Objective:     Vital Signs (Most Recent):  Temp: 99.4 °F (37.4 °C) (02/05/18 2002)  Pulse: 106 (02/05/18 2002)  Resp: 18 (02/05/18 2002)  BP: (!) 155/95 (02/05/18 2002)  SpO2: 100 % (02/05/18 2002) Vital Signs (24h Range):  Temp:  [98.3 °F (36.8 °C)-99.4 °F (37.4 °C)] 99.4 °F (37.4 °C)  Pulse:  [] 106  Resp:  [16-18] 18  SpO2:  [97 %-100 %] 100 %  BP: (129-155)/(74-95) 155/95     Weight: 47.6 kg (105 lb)  Body mass index is 19.84 kg/m².  No intake or output data in the 24 hours ending 02/06/18 0048     Physical Exam   Constitutional: She appears well-developed.   HENT:   Head: Normocephalic and atraumatic.   Right Ear: External ear normal.   Left Ear: External ear normal.   Nose: Nose normal.   Eyes: EOM are normal. Pupils are equal, round, and reactive to light.   Neck: No tracheal deviation present. No thyromegaly present.   Cardiovascular: Normal rate.    No murmur heard.  Pulmonary/Chest: Effort normal and breath sounds normal.   Abdominal: Soft. There is no tenderness. No hernia.   Musculoskeletal: She exhibits no edema.   + R flank pain, no L flank pain. No increased pain with percussion of R flank   Neurological: She displays normal reflexes. No cranial nerve deficit.   Skin: No rash noted.   Psychiatric: She has a  normal mood and affect. Judgment normal.   Vitals reviewed.      Significant Labs:   BMP:     Recent Labs  Lab 02/05/18  0441 02/06/18  0001   * 94    141   K 3.8 2.9*    106   CO2 27 26   BUN 7 6   CREATININE 0.7 0.6   CALCIUM 8.7 8.6*   MG 1.6  --      CBC:     Recent Labs  Lab 02/04/18  0554 02/05/18  0441 02/06/18  0001   WBC 9.15 9.69 13.44*   HGB 10.6* 11.3* 11.1*   HCT 31.3* 33.2* 33.1*    290 315     CMP:     Recent Labs  Lab 02/04/18  0554 02/05/18  0441 02/06/18  0001    142 141   K 3.7 3.8 2.9*    107 106   CO2 26 27 26   * 118* 94   BUN 6 7 6   CREATININE 0.6 0.7 0.6   CALCIUM 8.2* 8.7 8.6*   PROT 5.9* 6.2 6.5   ALBUMIN 2.8* 2.8* 3.0*   BILITOT 0.1 0.2 0.2   ALKPHOS 50* 48* 49*   AST 14 10 35   ALT 12 11 30   ANIONGAP 6* 8 9   EGFRNONAA >60.0 >60.0 >60.0     Urine Culture: No results for input(s): LABURIN in the last 48 hours.    Significant Imaging: I have reviewed all pertinent imaging results/findings within the past 24 hours.        CRANIAL NERVES     CN III, IV, VI   Pupils are equal, round, and reactive to light.  Extraocular motions are normal.         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 Denies CP, SOB, increased ileostomy output  - UCx and blood cx drawn 02/06, consider CT contrast to assess for perinephric abscess given prolonged UTI with inadequate antibiotics  - Given 1L NS in ED, /hr started given decreased PO intake  - Gentamicin 5mg/kg daily x 3 days with  troughs        Current chronic use of systemic steroids    Pt on pred 10 PO daily, has been on and off prednisone since age 12, cortisol level low on last admit; given stress dose steroids  - Will restart hydrocortisone 100 TID        Generalized anxiety disorder      Cont home alprazolam PRN        Crohn's disease                VTE Risk Mitigation         Ordered     enoxaparin injection 40 mg  Daily     Route:  Subcutaneous        02/06/18 0032     High Risk of VTE  Once      02/06/18 0032     Place JEANNA hose  Until discontinued      02/06/18 0000             Shila Sanz MD  Department of Hospital Medicine   [facility]

## 2018-02-07 ENCOUNTER — TELEPHONE (OUTPATIENT)
Dept: INTERNAL MEDICINE | Facility: CLINIC | Age: 38
End: 2018-02-07

## 2018-02-07 VITALS
OXYGEN SATURATION: 99 % | DIASTOLIC BLOOD PRESSURE: 80 MMHG | HEART RATE: 82 BPM | HEIGHT: 61 IN | RESPIRATION RATE: 20 BRPM | SYSTOLIC BLOOD PRESSURE: 133 MMHG | TEMPERATURE: 97 F | WEIGHT: 104.5 LBS | BODY MASS INDEX: 19.73 KG/M2

## 2018-02-07 LAB
ALBUMIN SERPL BCP-MCNC: 2.6 G/DL
ALP SERPL-CCNC: 50 U/L
ALT SERPL W/O P-5'-P-CCNC: 42 U/L
ANION GAP SERPL CALC-SCNC: 6 MMOL/L
AST SERPL-CCNC: 24 U/L
BACTERIA UR CULT: NO GROWTH
BASOPHILS # BLD AUTO: 0 K/UL
BASOPHILS NFR BLD: 0 %
BILIRUB SERPL-MCNC: 0.2 MG/DL
BUN SERPL-MCNC: 8 MG/DL
CALCIUM SERPL-MCNC: 8.2 MG/DL
CHLORIDE SERPL-SCNC: 107 MMOL/L
CO2 SERPL-SCNC: 28 MMOL/L
CREAT SERPL-MCNC: 0.6 MG/DL
DIFFERENTIAL METHOD: ABNORMAL
EOSINOPHIL # BLD AUTO: 0.1 K/UL
EOSINOPHIL NFR BLD: 0.7 %
ERYTHROCYTE [DISTWIDTH] IN BLOOD BY AUTOMATED COUNT: 13.9 %
EST. GFR  (AFRICAN AMERICAN): >60 ML/MIN/1.73 M^2
EST. GFR  (NON AFRICAN AMERICAN): >60 ML/MIN/1.73 M^2
GLUCOSE SERPL-MCNC: 90 MG/DL
HCT VFR BLD AUTO: 30.2 %
HGB BLD-MCNC: 10.2 G/DL
IMM GRANULOCYTES # BLD AUTO: 0.03 K/UL
IMM GRANULOCYTES NFR BLD AUTO: 0.4 %
LYMPHOCYTES # BLD AUTO: 2.8 K/UL
LYMPHOCYTES NFR BLD: 34.5 %
MAGNESIUM SERPL-MCNC: 1.7 MG/DL
MCH RBC QN AUTO: 30.9 PG
MCHC RBC AUTO-ENTMCNC: 33.8 G/DL
MCV RBC AUTO: 92 FL
MONOCYTES # BLD AUTO: 1 K/UL
MONOCYTES NFR BLD: 12.8 %
NEUTROPHILS # BLD AUTO: 4.2 K/UL
NEUTROPHILS NFR BLD: 51.6 %
NRBC BLD-RTO: 0 /100 WBC
PHOSPHATE SERPL-MCNC: 3.6 MG/DL
PLATELET # BLD AUTO: 257 K/UL
PMV BLD AUTO: 9.6 FL
POTASSIUM SERPL-SCNC: 3 MMOL/L
PROT SERPL-MCNC: 5.7 G/DL
RBC # BLD AUTO: 3.3 M/UL
SODIUM SERPL-SCNC: 141 MMOL/L
WBC # BLD AUTO: 8.12 K/UL

## 2018-02-07 PROCEDURE — 83735 ASSAY OF MAGNESIUM: CPT

## 2018-02-07 PROCEDURE — 25000003 PHARM REV CODE 250: Performed by: STUDENT IN AN ORGANIZED HEALTH CARE EDUCATION/TRAINING PROGRAM

## 2018-02-07 PROCEDURE — 63600175 PHARM REV CODE 636 W HCPCS: Performed by: STUDENT IN AN ORGANIZED HEALTH CARE EDUCATION/TRAINING PROGRAM

## 2018-02-07 PROCEDURE — 85025 COMPLETE CBC W/AUTO DIFF WBC: CPT

## 2018-02-07 PROCEDURE — 63600175 PHARM REV CODE 636 W HCPCS: Performed by: INTERNAL MEDICINE

## 2018-02-07 PROCEDURE — 63600175 PHARM REV CODE 636 W HCPCS

## 2018-02-07 PROCEDURE — 84100 ASSAY OF PHOSPHORUS: CPT

## 2018-02-07 PROCEDURE — 99238 HOSP IP/OBS DSCHRG MGMT 30/<: CPT | Mod: ,,, | Performed by: HOSPITALIST

## 2018-02-07 PROCEDURE — 99232 SBSQ HOSP IP/OBS MODERATE 35: CPT | Mod: ,,, | Performed by: NURSE PRACTITIONER

## 2018-02-07 PROCEDURE — 25000003 PHARM REV CODE 250: Performed by: INTERNAL MEDICINE

## 2018-02-07 PROCEDURE — 80053 COMPREHEN METABOLIC PANEL: CPT

## 2018-02-07 RX ORDER — MAGNESIUM SULFATE HEPTAHYDRATE 40 MG/ML
2 INJECTION, SOLUTION INTRAVENOUS ONCE
Status: COMPLETED | OUTPATIENT
Start: 2018-02-07 | End: 2018-02-07

## 2018-02-07 RX ORDER — DULOXETIN HYDROCHLORIDE 30 MG/1
30 CAPSULE, DELAYED RELEASE ORAL 2 TIMES DAILY
Status: DISCONTINUED | OUTPATIENT
Start: 2018-02-07 | End: 2018-02-07

## 2018-02-07 RX ORDER — DULOXETIN HYDROCHLORIDE 30 MG/1
30 CAPSULE, DELAYED RELEASE ORAL DAILY
Status: DISCONTINUED | OUTPATIENT
Start: 2018-02-07 | End: 2018-02-07 | Stop reason: HOSPADM

## 2018-02-07 RX ORDER — HEPARIN 100 UNIT/ML
10 SYRINGE INTRAVENOUS
Status: DISCONTINUED | OUTPATIENT
Start: 2018-02-07 | End: 2018-02-07 | Stop reason: HOSPADM

## 2018-02-07 RX ORDER — FLUCONAZOLE 100 MG/1
200 TABLET ORAL ONCE
Status: COMPLETED | OUTPATIENT
Start: 2018-02-07 | End: 2018-02-07

## 2018-02-07 RX ORDER — SODIUM CHLORIDE 0.9 % (FLUSH) 0.9 %
5 SYRINGE (ML) INJECTION EVERY 6 HOURS PRN
Status: DISCONTINUED | OUTPATIENT
Start: 2018-02-07 | End: 2018-02-07 | Stop reason: HOSPADM

## 2018-02-07 RX ORDER — DULOXETIN HYDROCHLORIDE 30 MG/1
30 CAPSULE, DELAYED RELEASE ORAL DAILY
Qty: 30 CAPSULE | Refills: 11 | Status: SHIPPED | OUTPATIENT
Start: 2018-02-07 | End: 2018-04-17

## 2018-02-07 RX ADMIN — ALPRAZOLAM 0.5 MG: 0.5 TABLET ORAL at 12:02

## 2018-02-07 RX ADMIN — OXYCODONE HYDROCHLORIDE AND ACETAMINOPHEN 1 TABLET: 10; 325 TABLET ORAL at 09:02

## 2018-02-07 RX ADMIN — ONDANSETRON 8 MG: 8 TABLET, ORALLY DISINTEGRATING ORAL at 02:02

## 2018-02-07 RX ADMIN — ERTAPENEM SODIUM 1 G: 1 INJECTION, POWDER, LYOPHILIZED, FOR SOLUTION INTRAMUSCULAR; INTRAVENOUS at 11:02

## 2018-02-07 RX ADMIN — DIPHENOXYLATE HYDROCHLORIDE AND ATROPINE SULFATE 1 TABLET: 2.5; .025 TABLET ORAL at 02:02

## 2018-02-07 RX ADMIN — ALPRAZOLAM 0.5 MG: 0.5 TABLET ORAL at 11:02

## 2018-02-07 RX ADMIN — FLUCONAZOLE 200 MG: 100 TABLET ORAL at 02:02

## 2018-02-07 RX ADMIN — MAGNESIUM SULFATE IN WATER 2 G: 40 INJECTION, SOLUTION INTRAVENOUS at 01:02

## 2018-02-07 RX ADMIN — SODIUM CHLORIDE: 0.9 INJECTION, SOLUTION INTRAVENOUS at 09:02

## 2018-02-07 RX ADMIN — OXYCODONE HYDROCHLORIDE AND ACETAMINOPHEN 1 TABLET: 10; 325 TABLET ORAL at 02:02

## 2018-02-07 RX ADMIN — LISINOPRIL 10 MG: 10 TABLET ORAL at 09:02

## 2018-02-07 RX ADMIN — DULOXETINE 30 MG: 30 CAPSULE, DELAYED RELEASE ORAL at 11:02

## 2018-02-07 RX ADMIN — ONDANSETRON 8 MG: 8 TABLET, ORALLY DISINTEGRATING ORAL at 09:02

## 2018-02-07 RX ADMIN — POTASSIUM BICARBONATE 50 MEQ: 25 TABLET, EFFERVESCENT ORAL at 11:02

## 2018-02-07 RX ADMIN — OXYCODONE HYDROCHLORIDE AND ACETAMINOPHEN 1 TABLET: 10; 325 TABLET ORAL at 12:02

## 2018-02-07 RX ADMIN — POTASSIUM BICARBONATE 50 MEQ: 25 TABLET, EFFERVESCENT ORAL at 02:02

## 2018-02-07 RX ADMIN — HEPARIN 10 UNITS: 100 SYRINGE at 02:02

## 2018-02-07 RX ADMIN — ONDANSETRON 4 MG: 2 INJECTION INTRAMUSCULAR; INTRAVENOUS at 12:02

## 2018-02-07 RX ADMIN — PREDNISONE 10 MG: 10 TABLET ORAL at 09:02

## 2018-02-07 NOTE — PLAN OF CARE
02/07/18 0922   Discharge Assessment   Assessment Type Discharge Planning Assessment   Confirmed/corrected address and phone number on facesheet? Yes   Assessment information obtained from? Patient;Medical Record   Expected Length of Stay (days) 3   Communicated expected length of stay with patient/caregiver yes   Prior to hospitilization cognitive status: Alert/Oriented   Prior to hospitalization functional status: Independent   Current cognitive status: Alert/Oriented   Current Functional Status: Independent   Lives With child(katalina), dependent;parent(s)   Able to Return to Prior Arrangements yes   Is patient able to care for self after discharge? Yes   Who are your caregiver(s) and their phone number(s)? parents assist   Patient's perception of discharge disposition home or selfcare;other (comments)  (infusion company)   Readmission Within The Last 30 Days current reason for admission unrelated to previous admission   If yes, most recent facility name: [facility]   Patient currently being followed by outpatient case management? No   Patient currently receives any other outside agency services? No   Equipment Currently Used at Home colostomy/ostomy supplies   Do you have any problems affording any of your prescribed medications? No   Is the patient taking medications as prescribed? yes   Does the patient have transportation home? Yes   Transportation Available family or friend will provide   Does the patient receive services at the [facility]? No   Discharge Plan A Home with family   Patient/Family In Agreement With Plan yes   Readmission Questionnaire   At the time of your discharge, did someone talk to you about what your health problems were? Yes   At the time of discharge, did someone talk to you about what to watch out for regarding worsening of your health problem? Yes   At the time of discharge, did someone talk to you about what to do if you experienced worsening of your health problem? Yes   At the time of  discharge, did someone talk to you about which medication to take when you left the hospital and which ones to stop taking? Yes   At the time of discharge, did someone talk to you about when and where to follow up with a doctor after you left the hospital? Yes   What do you believe caused you to be sick enough to be re-admitted? called to come back 2/2 + cultures and unable to tolerate oral abx   How often do you need to have someone help you when you read instructions, pamphlets, or other written material from your doctor or pharmacy? Never

## 2018-02-07 NOTE — ASSESSMENT & PLAN NOTE
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 Denies CP, SOB, increased ileostomy output  - UCx and blood cx drawn 02/06, consider CT contrast to assess for perinephric abscess given prolonged UTI with inadequate antibiotics  - Given 1L NS in ED, /hr started given decreased PO intake  - Gentamicin x1 on 2/6  - CT A/P shows bilateral nonobstructing renal calculi. No evidence of hydronephrosis or obstructing stone in either ureter. No renal abscess.  - Started Ertapenem 1g q24 on 2/6; final recs on duration per ID  - Midline consult, anticipated home antibiotics infusion

## 2018-02-07 NOTE — TELEPHONE ENCOUNTER
----- Message from Beatrice Oakley sent at 2/5/2018  5:43 PM CST -----  Contact: Pt can be reached at [phone]  Pt missed a call and would like the doctor to return their call.        Thank you!

## 2018-02-07 NOTE — ASSESSMENT & PLAN NOTE
37 year old woman with Crohn's disease (diagnosed at age 10) s/p TAC + ileostomy in 2012 and history of chronic, recurrent UTI and kidney stones.  Follows with Dr. Obrien, Urology, at [facility].  Currently on 10 mg prednisone daily (recently stopped Stelara).   Recent admission to [facility] on 2/2 to 2/5 after presenting to ED with chest pain thought to be related to urosepsis (CTA PE negative, troponin negative, EKG wnl). Her U/A was positive and she initially treated with 3 days of IV ceftriaxone, but urine culture, resulted after discharge, showing an ESBL E coli which is resistant to cefepime.  She was prescribed Bactrim DS X 3 days to take as an outpatient, but she was unable to tolerate the medication because of persistent nausea and she re-presented to ED.  She complains of fatigue, right flank pain, low grade fevers and increased stool output.  She is not having her typical UTI symptoms.  Has continued nausea, no vomiting.  Afebrile at time of visit. WBC 13.9.   She received a dose of gentamicin in ED and is currently on IV ertapenem.  Repeat U/A on this admit is negative.  Urine culture pending.  Blood cultures NGTD.   Hemodynamically stable.  Non-septic appearing.      -  Recommend CT abd/pelvis with contrast and renal stone protocol to evaluated for pyelo, renal abscess, and kidney stones.  POCT pregnancy test prior to imaging.    -  Continue IV ertapenem 1 gram q 24 hours.    -  Recommend stool for C diff given reports of increased stool output, recent abx and she as history of C diff X 2.      -  Will follow.     Patient seen by, and plan discussed with, ID staff  Discussed with Primary Team

## 2018-02-07 NOTE — ASSESSMENT & PLAN NOTE
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 Denies CP, SOB, increased ileostomy output  - UCx and blood cx drawn 02/06, consider CT contrast to assess for perinephric abscess given prolonged UTI with inadequate antibiotics  - Given 1L NS in ED, /hr started given decreased PO intake  - Gentamicin x1 on 2/6  - CT A/P shows bilateral nonobstructing renal calculi. No evidence of hydronephrosis or obstructing stone in either ureter. No renal abscess.  - Started Ertapenem 1g q24 on 2/6; final recs on duration per ID  - Patient ok to be discharged without additional antibiotics per ID. She has received adequate treatment, considering her UA on this admission was clean prior to first dose of zosyn in the Ed.  - fluconazole 200 mg 1x dose for yeast infection

## 2018-02-07 NOTE — ASSESSMENT & PLAN NOTE
Pt on pred 10 PO daily, has been on and off prednisone since age 12, cortisol level low on last admit, but was receiving exogenous steroids.  - Restarted home dose prednisone 10 mg PO daily

## 2018-02-07 NOTE — HOSPITAL COURSE
Patient was admitted to hospital medicine team 3 on 2/5/18. She received one dose of zosyn in the ED overnight followed by gentamicin x1 in the morning. She was then switched to Ertapenem per ID recommendations, first dose on 2/6. CT abdomen pelvis showed bilateral renal calculi, largest being 4mm, non-obstructing and no stones in the ureters. No renal abscess. EKG showed prolonged qtc. Her home meds dronabinol and citalopram were discontinued and replaced with duloxetine. C. Diff was negative. Per ID recommendations, she will be discharged without any further antibiotics as she has received 1 dose of zosyn, 1 dose of gentamicin, and 2 doses of ertapenem. Her U/A was negative, her urine culture is now negative, and her flank pain is resolving. She is to follow up in ID clinic in 7-10 days.

## 2018-02-07 NOTE — SUBJECTIVE & OBJECTIVE
Interval History:  No acute events overnight.  Afebrile.  Mild leukocytosis on admission has resolved.  U/A on 2/5 admit was negative.  Urine culture taken prior to abx shows no growth.   CT abd/pelvis shows non-obstructive renal stones. No stones in ureters.  No evidence to suggest renal abscess.    She denies complaints. No fever.  No urinary symptoms.  Mild abdominal tenderness she attributes to her Crohn's.  Right flank pain resolved.     Review of Systems   Constitutional: Positive for activity change and appetite change. Negative for chills and fever.   HENT: Negative.    Respiratory: Negative for cough, chest tightness, shortness of breath and wheezing.    Cardiovascular: Positive for palpitations. Negative for chest pain and leg swelling.   Gastrointestinal: Positive for diarrhea. Negative for abdominal pain, constipation, nausea and vomiting.        Ostomy with increased output   Genitourinary: Positive for flank pain (right flank pain resolved ). Negative for decreased urine volume, difficulty urinating, dysuria and hematuria.   Musculoskeletal: Negative for arthralgias and myalgias.   Skin: Negative for rash.   Neurological: Negative for dizziness, weakness and headaches.   Hematological: Negative for adenopathy.   Psychiatric/Behavioral: Negative for agitation.     Objective:     Vital Signs (Most Recent):  Temp: 97 °F (36.1 °C) (02/07/18 1151)  Pulse: 82 (02/07/18 1151)  Resp: 20 (02/07/18 1151)  BP: 133/80 (02/07/18 1151)  SpO2: 99 % (02/07/18 1151) Vital Signs (24h Range):  Temp:  [96.7 °F (35.9 °C)-97.2 °F (36.2 °C)] 97 °F (36.1 °C)  Pulse:  [70-99] 82  Resp:  [16-20] 20  SpO2:  [96 %-99 %] 99 %  BP: (130-141)/(74-89) 133/80     Weight: 47.4 kg (104 lb 8 oz)  Body mass index is 19.75 kg/m².    Estimated Creatinine Clearance: 97.9 mL/min (based on SCr of 0.6 mg/dL).    Physical Exam   Constitutional: She is oriented to person, place, and time. She appears well-developed and well-nourished. No  distress.   Eyes: Conjunctivae are normal. No scleral icterus.   Neck: Normal range of motion. Neck supple.   Cardiovascular: Normal rate, regular rhythm and normal heart sounds.    No murmur heard.  Pulmonary/Chest: Effort normal and breath sounds normal. No respiratory distress.   Abdominal: Soft. There is no tenderness.   Ostomy site clear       Musculoskeletal: She exhibits no edema.   Neurological: She is alert and oriented to person, place, and time.   Skin: Skin is warm and dry. No rash noted. She is not diaphoretic.   Psychiatric: She has a normal mood and affect. Her behavior is normal.   Vitals reviewed.      Significant Labs:   Blood Culture:   Recent Labs  Lab 11/20/17  2335 02/02/18  2206 02/02/18  2213 02/06/18  0107 02/06/18  0117   LABBLOO No growth after 5 days.  No growth after 5 days. No Growth to date  No Growth to date  No Growth to date  No Growth to date  No Growth to date No Growth to date  No Growth to date  No Growth to date  No Growth to date  No Growth to date No Growth to date  No Growth to date No Growth to date  No Growth to date     CBC:   Recent Labs  Lab 02/06/18  0001 02/07/18  0542   WBC 13.44* 8.12   HGB 11.1* 10.2*   HCT 33.1* 30.2*    257     CMP:   Recent Labs  Lab 02/06/18  0001 02/07/18  0542    141   K 2.9* 3.0*    107   CO2 26 28   GLU 94 90   BUN 6 8   CREATININE 0.6 0.6   CALCIUM 8.6* 8.2*   PROT 6.5 5.7*   ALBUMIN 3.0* 2.6*   BILITOT 0.2 0.2   ALKPHOS 49* 50*   AST 35 24   ALT 30 42   ANIONGAP 9 6*   EGFRNONAA >60.0 >60.0     Urine Culture:   Recent Labs  Lab 08/22/17  1104 10/16/17  1107 10/23/17  1317 02/02/18 2258 02/05/18  2334   LABURIN No growth ESCHERICHIA COLI ESBL>100,000 cfu/ml No growth ESCHERICHIA COLI ESBL>100,000 cfu/ml No growth     Urine Studies:   Recent Labs  Lab 10/16/17  1108  02/02/18  2258 02/05/18  2334   COLORU Yellow  < > Yellow Straw   APPEARANCEUA Cloudy*  < > Hazy* Clear   PHUR 6.0  < > 5.0 7.0   SPECGRAV 1.025   < > 1.020 1.010   PROTEINUA 2+*  < > Negative Negative   GLUCUA Negative  < > Negative Negative   KETONESU Negative  < > Negative Negative   BILIRUBINUA Negative  < > Negative Negative   OCCULTUA 2+*  < > Negative Negative   NITRITE Positive*  < > Positive* Negative   UROBILINOGEN Negative  < > Negative Negative   LEUKOCYTESUR 2+*  < > 2+* Negative   RBCUA 11*  < > 2  --    WBCUA >100*  < > >100*  --    BACTERIA Many*  < > Many*  --    SQUAMEPITHEL 2  < > 1  --    HYALINECASTS 0  --   --   --    < > = values in this interval not displayed.    Significant Imaging: I have reviewed all pertinent imaging results/findings within the past 24 hours.

## 2018-02-07 NOTE — ASSESSMENT & PLAN NOTE
- Cont home alprazolam PRN  - holding citalopram and dronabinol for qtc prolongation  - switching citalopram to duloxetine per pharmacy recommendation

## 2018-02-07 NOTE — PLAN OF CARE
Met with patient at bedside. Patient will probably need IV abx for 10-14 days and is able to do at home. Will arrange with infusion company when ID recs rec'd. Patient has a port for infusion, currently accessed. Will follow.     12:03pm - per ID no need for continued IV abx; patient ready for discharge. Follow up previously scheduled.

## 2018-02-07 NOTE — PROGRESS NOTES
[facility] Hospital Medicine  Progress Note    Patient Name: Ivy Salgado  MRN: [id number]  Patient Class: IP- Inpatient   Admission Date: 2/5/2018  Length of Stay: 2 days  Attending Physician: Jonas Allan MD  Primary Care Provider: Kalia Astorga MD    Hospital Medicine Team: [facility] HOSP MED 3 Lb Wright MD    Subjective:     Principal Problem:UTI due to extended-spectrum beta lactamase (ESBL) producing Escherichia coli    HPI:  37F with Crohn's disease (diagnosed at age 10) s/p TAC + ileostomy in 2012 on 10 mg pred daily recently stopped stelara 01/24, anxiety, HTN who was recently discharged 02/05 from [facility] for chest pain related to urosepsis (CTA PE negative, trop negative, tele showed NSR x48H). BCx were negative, pt was put on 3 day course of ceftriaxone; UCx resulted 02/05 showing ESBL E coli resistant to ceftriaxone. Bactrim DS BID x 3 days was called in for pt to take outpatient but pt states she is unable to tolerate PO medications due to nausea. Also states she has fatigue, palpitations, malodorous urine, and R flank pain (started 02/05). Denies hematuria, dysuria, CP, SOB, change in ostomy output, color, or odor.    In the ED pt was given 1L NS + Zosyn x1 (02/02 culture shows sensitivity to pip-tazo)    Admitted to Hospital Medicine for treatment / workup of ESBL UTI    Hospital Course:  No notes on file    Interval History: NAEON. Patient complains of some nausea but no vomiting. Flank pain improving. CT A/P shows renal calculi but no obstructing stones. No renal abscess. C Diff negative. Plan: Ok to discharge without antibiotics per ID - patient has received adequate treatment.    Review of Systems   Constitutional: Positive for activity change and appetite change. Negative for diaphoresis, fatigue and fever.   Respiratory: Negative for cough, chest tightness, shortness of breath, wheezing and stridor.    Cardiovascular: Positive for palpitations. Negative for chest  pain and leg swelling.   Gastrointestinal: Positive for nausea. Negative for abdominal distention, abdominal pain, constipation, diarrhea and vomiting.   Genitourinary: Positive for flank pain. Negative for dysuria, frequency and hematuria.        +malodorous urine   Musculoskeletal: Negative for arthralgias, back pain, gait problem, joint swelling, myalgias, neck pain and neck stiffness.   Neurological: Negative for seizures, syncope, facial asymmetry, light-headedness and headaches.     Objective:     Vital Signs (Most Recent):  Temp: 97.1 °F (36.2 °C) (02/07/18 0500)  Pulse: 74 (02/07/18 0500)  Resp: 18 (02/07/18 0500)  BP: 138/79 (02/07/18 0500)  SpO2: 99 % (02/07/18 0500) Vital Signs (24h Range):  Temp:  [96.3 °F (35.7 °C)-98.6 °F (37 °C)] 97.1 °F (36.2 °C)  Pulse:  [68-83] 74  Resp:  [18] 18  SpO2:  [98 %-100 %] 99 %  BP: (123-183)/(75-91) 138/79     Weight: 47.4 kg (104 lb 8 oz)  Body mass index is 19.75 kg/m².    Intake/Output Summary (Last 24 hours) at 02/07/18 0908  Last data filed at 02/07/18 0600   Gross per 24 hour   Intake          3460.42 ml   Output                0 ml   Net          3460.42 ml      Physical Exam   Constitutional: She appears well-developed.   HENT:   Head: Normocephalic and atraumatic.   Right Ear: External ear normal.   Left Ear: External ear normal.   Nose: Nose normal.   Eyes: EOM are normal. Pupils are equal, round, and reactive to light.   Neck: No tracheal deviation present. No thyromegaly present.   Cardiovascular: Normal rate.    No murmur heard.  Pulmonary/Chest: Effort normal and breath sounds normal.   Abdominal: Soft. There is no tenderness. No hernia.   Musculoskeletal: She exhibits no edema.   + R flank pain, no L flank pain. No increased pain with percussion of R flank   Neurological: She displays normal reflexes. No cranial nerve deficit.   Skin: No rash noted.   Psychiatric: She has a normal mood and affect. Judgment normal.   Vitals reviewed.      Significant Labs:  None    Significant Imaging: I have reviewed all pertinent imaging results/findings within the past 24 hours.     CT A/P 2/6:  1. Bilateral nonobstructing renal calculi. No evidence of hydronephrosis or obstructing stone in either ureter.    2. Mild bilateral cortical scarring of the lower renal poles.    3. Postoperative change of prior colectomy and right lower quadrant ileostomy.    4. Additional incidental findings as above.    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 Denies CP, SOB, increased ileostomy output  - UCx and blood cx drawn 02/06, consider CT contrast to assess for perinephric abscess given prolonged UTI with inadequate antibiotics  - Given 1L NS in ED, /hr started given decreased PO intake  - Gentamicin x1 on 2/6  - CT A/P shows bilateral nonobstructing renal calculi. No evidence of hydronephrosis or obstructing stone in either ureter. No renal abscess.  - Started Ertapenem 1g q24 on 2/6; final recs on duration per ID  - Midline consult, anticipated home antibiotics infusion  - Patient ok to be discharged without additional antibiotics per Id. She has received adequate treatment, considering her UA on this admission was clean prior to first dose of zosyn in the Ed.  - fluconazole 200 mg 1x dose for yeast infection        Current chronic use of systemic steroids    Pt on pred 10 PO daily, has been on and off prednisone since age 12, cortisol level low on last admit, but was receiving exogenous steroids.  - Restarted home  dose prednisone 10 mg PO daily        Generalized anxiety disorder    - Cont home alprazolam PRN  - holding citalopram and dronabinol for qtc prolongation  - switching citalopram to duloxetine per pharmacy recommendation           VTE Risk Mitigation         Ordered     enoxaparin injection 40 mg  Daily     Route:  Subcutaneous        02/06/18 0032     High Risk of VTE  Once      02/06/18 0032     Place JEANNA hose  Until discontinued      02/06/18 0000              Lb Wright MD  Department of Hospital Medicine   [facility]

## 2018-02-07 NOTE — SUBJECTIVE & OBJECTIVE
Interval History: NAEON. Patient complains of some nausea but no vomiting. Flank pain improving. CT A/P shows renal calculi but no obstructing stones. No renal abscess. C Diff negative. Plan: Continue IV antibiotics (ertapenem) per Id. Midline consult for anticipated home infusion antibiotics.    Review of Systems   Constitutional: Positive for activity change and appetite change. Negative for diaphoresis, fatigue and fever.   Respiratory: Negative for cough, chest tightness, shortness of breath, wheezing and stridor.    Cardiovascular: Positive for palpitations. Negative for chest pain and leg swelling.   Gastrointestinal: Positive for nausea. Negative for abdominal distention, abdominal pain, constipation, diarrhea and vomiting.   Genitourinary: Positive for flank pain. Negative for dysuria, frequency and hematuria.        +malodorous urine   Musculoskeletal: Negative for arthralgias, back pain, gait problem, joint swelling, myalgias, neck pain and neck stiffness.   Neurological: Negative for seizures, syncope, facial asymmetry, light-headedness and headaches.     Objective:     Vital Signs (Most Recent):  Temp: 97.1 °F (36.2 °C) (02/07/18 0500)  Pulse: 74 (02/07/18 0500)  Resp: 18 (02/07/18 0500)  BP: 138/79 (02/07/18 0500)  SpO2: 99 % (02/07/18 0500) Vital Signs (24h Range):  Temp:  [96.3 °F (35.7 °C)-98.6 °F (37 °C)] 97.1 °F (36.2 °C)  Pulse:  [68-83] 74  Resp:  [18] 18  SpO2:  [98 %-100 %] 99 %  BP: (123-183)/(75-91) 138/79     Weight: 47.4 kg (104 lb 8 oz)  Body mass index is 19.75 kg/m².    Intake/Output Summary (Last 24 hours) at 02/07/18 0908  Last data filed at 02/07/18 0600   Gross per 24 hour   Intake          3460.42 ml   Output                0 ml   Net          3460.42 ml      Physical Exam   Constitutional: She appears well-developed.   HENT:   Head: Normocephalic and atraumatic.   Right Ear: External ear normal.   Left Ear: External ear normal.   Nose: Nose normal.   Eyes: EOM are normal. Pupils  are equal, round, and reactive to light.   Neck: No tracheal deviation present. No thyromegaly present.   Cardiovascular: Normal rate.    No murmur heard.  Pulmonary/Chest: Effort normal and breath sounds normal.   Abdominal: Soft. There is no tenderness. No hernia.   Musculoskeletal: She exhibits no edema.   + R flank pain, no L flank pain. No increased pain with percussion of R flank   Neurological: She displays normal reflexes. No cranial nerve deficit.   Skin: No rash noted.   Psychiatric: She has a normal mood and affect. Judgment normal.   Vitals reviewed.      Significant Labs: None    Significant Imaging: I have reviewed all pertinent imaging results/findings within the past 24 hours.     CT A/P 2/6:  1. Bilateral nonobstructing renal calculi. No evidence of hydronephrosis or obstructing stone in either ureter.    2. Mild bilateral cortical scarring of the lower renal poles.    3. Postoperative change of prior colectomy and right lower quadrant ileostomy.    4. Additional incidental findings as above.

## 2018-02-07 NOTE — CONSULTS
[facility]  Infectious Disease  Consult Note    Patient Name: Ivy Salgado  MRN: [id number]  Admission Date: 2/5/2018  Hospital Length of Stay: 1 days  Attending Physician: Jonas Allan MD  Primary Care Provider: Kalia Astorga MD     Isolation Status: Special Contact    Patient information was obtained from patient, past medical records and ER records.      Consults  Assessment/Plan:     * UTI due to extended-spectrum beta lactamase (ESBL) producing Escherichia coli        37 year old woman with Crohn's disease (diagnosed at age 10) s/p TAC + ileostomy in 2012 and history of chronic, recurrent UTI and kidney stones.  Follows with Dr. Obrien, Urology, at [facility].  Currently on 10 mg prednisone daily (recently stopped Stelara).   [place] [facility] [place] 2/5 after presenting to ED with chest pain thought to be related to urosepsis (CTA PE negative, troponin negative, EKG wnl). Her U/A was positive and she initially treated with 3 days of IV ceftriaxone, but urine culture, resulted after discharge, showing an ESBL E coli which is resistant to cefepime.  She was prescribed Bactrim DS X 3 days to take as an outpatient, but she was unable to tolerate the medication because of persistent nausea and she re-presented to ED.  She complains of fatigue, right flank pain, low grade fevers and increased stool output.  She is not having her typical UTI symptoms.  Has continued nausea, no vomiting.  Afebrile at time of visit. WBC 13.9.   She received a dose of gentamicin in ED and is currently on IV ertapenem.  Repeat U/A on this admit is negative.  Urine culture pending.  Blood cultures NGTD.   Hemodynamically stable.  Non-septic appearing.      -  Recommend CT abd/pelvis with contrast and renal stone protocol to evaluated for pyelo, renal abscess, and kidney stones.  POCT pregnancy test prior to imaging.    -  Continue IV ertapenem 1 gram q 24 hours.    -  Recommend stool for C diff given  "reports of increased stool output, recent abx and she as history of C diff X 2.      -  Will follow.     Patient seen by, and plan discussed with, ID staff  Discussed with Primary Team                  Thank you for your consult. I will follow-up with patient. Please contact us if you have any additional questions.    Thank you.   Please call for any questions or concerns.  UshaOFELIA Sandoval, ANP-C  [phone]    Subjective:     Principal Problem: UTI due to extended-spectrum beta lactamase (ESBL) producing Escherichia coli    HPI:        Ms. Delacruz is a 37 year old with Crohn's disease (diagnosed at age 10) s/p TAC + ileostomy in 2012 and chronic, recurrent UTI.   She recently stopped stelara on 1/24 and is on 10 mg prednisone daily.  She was recent admitted to [facility] on 2/2 to 2/5 after presenting to ED with chest pain thought to be related to Urosepsis (CTA PE negative, troponin negative, EKG wnl). Her U/A was positive and she initially treated with 3 days of IV ceftriaxone.  Urine culture resulted after discharge, showing an ESBL E coli which is resistant to cefepime.  She was prescribed Bactrim DS X 3 days to take as an outpatient, but she is unable to tolerate the medication because of persistent nausea and she re-presented to ED.  Also complaining of fatigue, right flank pain, and increased liquid stool output.  She was admitted for treatment of ESBL UTI and further evaluation.      She is not having her usual urinary symptoms of pain/burning. Reports "low grade " fever, 99.5 to 100.0  She has nausea, but no vomiting.    She reports history of kidney stones and follows with Dr. Obrien, Urology, at [facility] for chronic UTI.   Per his notes she was last seen in 11/2017.  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   She reports her current flank pain is not like her prior kidney stone pain.      At time of visit she is not in acute distress.  She is afebrile.  WBC 13.44.   " Hemodynamically stable.   Repeat U/A on 2/5 is clear.   Urine culture pending.  She received dose of gentamicin in the ED and has been started on Ertapenem.  Blood cultures pending.        Past Medical History:   Diagnosis Date    Abnormal Pap smear 2007    Abnormal Pap smear 5/26/2011    Anemia     Anxiety     Arthritis     C. difficile diarrhea     Crohn's disease     Depression 8/5/2017    Encounter for blood transfusion     Genital HSV     History of colposcopy with cervical biopsy 2007 and 7/2011 2007-LYLA I  and 7/2011- LYLA I    Hypertension     Kidney stone     Kidney stone     Recurrent UTI 4/3/2013    S/P ileostomy 7/9/2012    Sterilization 6/23/2012       Past Surgical History:   Procedure Laterality Date    ABDOMINAL SURGERY      APPENDECTOMY      BLADDER SURGERY      partial cystectomy due to fistula    CKC      COLON SURGERY      COLONOSCOPY      HYSTERECTOMY  04/16/2015    SHELLIE    ILEOSTOMY      OOPHORECTOMY Right 04/16/2015    PORTACATH PLACEMENT      SKIN BIOPSY      SMALL INTESTINE SURGERY      TOTAL COLECTOMY      TUBAL LIGATION  06 06 2012    UPPER GASTROINTESTINAL ENDOSCOPY         Review of patient's allergies indicates:   Allergen Reactions    Vancomycin analogues Hives    Azathioprine sodium      Other reaction(s): pancreatitis  Other reaction(s): pancreatitis       Medications:  Prescriptions Prior to Admission   Medication Sig    acetaminophen (TYLENOL) 650 MG TbSR Take 650 mg by mouth daily as needed (fever/pain).    biotin 1,000 mcg Chew Take 1 tablet by mouth once daily.    citalopram (CELEXA) 20 MG tablet Take 20 mg by mouth once daily.    diphenoxylate-atropine 2.5-0.025 mg (LOMOTIL) 2.5-0.025 mg per tablet TAKE 1 TABLET BY MOUTH 3 TIMES A DAY AS NEEDED FOR DIARRHEA    dronabinol (MARINOL) 2.5 MG capsule Take 1 capsule (2.5 mg total) by mouth 2 (two) times daily before meals.    IBUPROFEN ORAL Take 400-600 mg by mouth daily as needed  (fever/pain).    lisinopril 10 MG tablet TAKE 1 TABLET (10 MG TOTAL) BY MOUTH ONCE DAILY.    loperamide (IMODIUM) 2 mg capsule Take 2 mg by mouth daily as needed for Diarrhea.    metroNIDAZOLE (FLAGYL) 500 MG tablet Take 500 mg by mouth every 8 (eight) hours.    multivitamin (ONE DAILY MULTIVITAMIN) per tablet Take 1 tablet by mouth once daily.    ondansetron (ZOFRAN-ODT) 8 MG TbDL TAKE 1 TABLET (8 MG TOTAL) BY MOUTH EVERY 8 (EIGHT) HOURS AS NEEDED (NAUSEA).    oxyCODONE-acetaminophen (PERCOCET)  mg per tablet Take 1 tablet by mouth every 6 (six) hours as needed for Pain.    predniSONE (DELTASONE) 10 MG tablet Take 1 tablet (10 mg total) by mouth once daily.    promethazine (PHENERGAN) 25 MG tablet TAKE 1 TABLET BY MOUTH EVERY 6 HOURS AS NEEDED FOR NAUSEA    sumatriptan (IMITREX) 100 MG tablet TAKE 1 TABLET BY MOUTH EVERY 2 HOURS AS NEEDED FOR MIGRAINE. DO NOT EXCEED 2 DOSES/ 24HRS    tacrolimus (PROTOPIC) 0.1 % ointment Apply 1 application topically 2 (two) times daily as needed (for rash on face).      Antibiotics     Start     Stop Route Frequency Ordered    02/06/18 1050  ertapenem (INVANZ) 1 g in sodium chloride 0.9 % 100 mL IVPB (ready to mix system)      -- IV Every 24 hours (non-standard times) 02/06/18 1050        Antifungals     None        Antivirals     None           Immunization History   Administered Date(s) Administered    Influenza 09/18/2013    Influenza - Quadrivalent - PF 10/04/2016, 11/17/2017    Influenza Split 09/18/2013    Pneumococcal Conjugate - 13 Valent 06/28/2017    Pneumococcal Polysaccharide - 23 Valent 08/19/2015    Td (ADULT) 06/28/2013       Family History     Problem Relation (Age of Onset)    Cancer Father, Maternal Grandfather    Colon cancer Father    Crohn's disease Brother    Endometrial cancer Maternal Aunt    Hypertension Mother    Skin cancer Maternal Grandfather        Social History     Social History    Marital status: Single     Spouse name: N/A     Number of children: N/A    Years of education: N/A     Occupational History     [employer]     Social History Main Topics    Smoking status: Never Smoker    Smokeless tobacco: Never Used    Alcohol use 0.0 oz/week      Comment: Patient only drinks wine not regular basis.    Drug use: No    Sexual activity: Yes     Partners: Male     Birth control/ protection: Pill, Surgical, Condom      Comment: HYST     Other Topics Concern    None     Social History Narrative    None     Review of Systems   Constitutional: Positive for activity change, appetite change and fever. Negative for chills.   HENT: Negative.    Respiratory: Negative for cough, chest tightness, shortness of breath and wheezing.    Cardiovascular: Positive for palpitations. Negative for chest pain and leg swelling.   Gastrointestinal: Positive for diarrhea and nausea. Negative for abdominal pain, constipation and vomiting.        Ostomy with increased output   Genitourinary: Positive for flank pain (right ). Negative for decreased urine volume, difficulty urinating, dysuria and hematuria.   Musculoskeletal: Negative for arthralgias and myalgias.   Skin: Negative for rash.   Neurological: Negative for dizziness, weakness and headaches.   Hematological: Negative for adenopathy.   Psychiatric/Behavioral: Negative for agitation.     Objective:     Vital Signs (Most Recent):  Temp: 97.9 °F (36.6 °C) (02/06/18 1609)  Pulse: 68 (02/06/18 1609)  Resp: 18 (02/06/18 1609)  BP: 130/79 (02/06/18 1609)  SpO2: 98 % (02/06/18 1609) Vital Signs (24h Range):  Temp:  [96.3 °F (35.7 °C)-99.4 °F (37.4 °C)] 97.9 °F (36.6 °C)  Pulse:  [] 68  Resp:  [18] 18  SpO2:  [98 %-100 %] 98 %  BP: (123-183)/(75-96) 130/79     Weight: 47.4 kg (104 lb 8 oz)  Body mass index is 19.75 kg/m².    Estimated Creatinine Clearance: 97.9 mL/min (based on SCr of 0.6 mg/dL).    Physical Exam   Constitutional: She is oriented to person, place, and time. She appears  well-developed and well-nourished. No distress.   Eyes: Conjunctivae are normal. No scleral icterus.   Neck: Normal range of motion. Neck supple.   Cardiovascular: Normal rate, regular rhythm and normal heart sounds.    No murmur heard.  Pulmonary/Chest: Effort normal and breath sounds normal. No respiratory distress.   Abdominal: Soft. There is no tenderness.   Ostomy site clear     Musculoskeletal: She exhibits no edema.   Right flank pain   Neurological: She is alert and oriented to person, place, and time.   Skin: Skin is warm and dry. No rash noted. She is not diaphoretic.   Psychiatric: She has a normal mood and affect. Her behavior is normal.   Vitals reviewed.      Significant Labs:   Blood Culture:   Recent Labs  Lab 11/20/17  2335 02/02/18  2206 02/02/18  2213 02/06/18  0107 02/06/18  0117   LABBLOO No growth after 5 days.  No growth after 5 days. No Growth to date  No Growth to date  No Growth to date  No Growth to date No Growth to date  No Growth to date  No Growth to date  No Growth to date No Growth to date No Growth to date     CBC:   Recent Labs  Lab 02/05/18  0441 02/06/18  0001   WBC 9.69 13.44*   HGB 11.3* 11.1*   HCT 33.2* 33.1*    315     CMP:   Recent Labs  Lab 02/05/18  0441 02/06/18  0001    141   K 3.8 2.9*    106   CO2 27 26   * 94   BUN 7 6   CREATININE 0.7 0.6   CALCIUM 8.7 8.6*   PROT 6.2 6.5   ALBUMIN 2.8* 3.0*   BILITOT 0.2 0.2   ALKPHOS 48* 49*   AST 10 35   ALT 11 30   ANIONGAP 8 9   EGFRNONAA >60.0 >60.0     Urine Culture:   Recent Labs  Lab 08/22/17  1104 10/16/17  1107 10/23/17  1317 02/02/18  2258   LABURIN No growth ESCHERICHIA COLI ESBL>100,000 cfu/ml No growth ESCHERICHIA COLI ESBL>100,000 cfu/ml     Urine Studies:   Recent Labs  Lab 10/16/17  1108  02/02/18  2258 02/05/18  2334   COLORU Yellow  < > Yellow Straw   APPEARANCEUA Cloudy*  < > Hazy* Clear   PHUR 6.0  < > 5.0 7.0   SPECGRAV 1.025  < > 1.020 1.010   PROTEINUA 2+*  < > Negative  Negative   GLUCUA Negative  < > Negative Negative   KETONESU Negative  < > Negative Negative   BILIRUBINUA Negative  < > Negative Negative   OCCULTUA 2+*  < > Negative Negative   NITRITE Positive*  < > Positive* Negative   UROBILINOGEN Negative  < > Negative Negative   LEUKOCYTESUR 2+*  < > 2+* Negative   RBCUA 11*  < > 2  --    WBCUA >100*  < > >100*  --    BACTERIA Many*  < > Many*  --    SQUAMEPITHEL 2  < > 1  --    HYALINECASTS 0  --   --   --    < > = values in this interval not displayed.    Significant Imaging: I have reviewed all pertinent imaging results/findings within the past 24 hours.

## 2018-02-08 ENCOUNTER — PATIENT MESSAGE (OUTPATIENT)
Dept: INTERNAL MEDICINE | Facility: CLINIC | Age: 38
End: 2018-02-08

## 2018-02-08 DIAGNOSIS — K50.819 CROHN'S DISEASE OF SMALL AND LARGE INTESTINES WITH COMPLICATION: ICD-10-CM

## 2018-02-08 LAB
BACTERIA BLD CULT: NORMAL
BACTERIA BLD CULT: NORMAL

## 2018-02-08 RX ORDER — FLUCONAZOLE 150 MG/1
150 TABLET ORAL DAILY
Qty: 1 TABLET | Refills: 1 | Status: SHIPPED | OUTPATIENT
Start: 2018-02-08 | End: 2018-02-09

## 2018-02-08 RX ORDER — OXYCODONE AND ACETAMINOPHEN 10; 325 MG/1; MG/1
1 TABLET ORAL EVERY 6 HOURS PRN
Qty: 40 TABLET | Refills: 0 | Status: SHIPPED | OUTPATIENT
Start: 2018-02-08 | End: 2018-03-06 | Stop reason: SDUPTHER

## 2018-02-08 NOTE — DISCHARGE SUMMARY
[facility] Hospital Medicine  Discharge Summary      Patient Name: Ivy Salgado  MRN: [id number]  Admission Date: 2/5/2018  Hospital Length of Stay: 2 days  Discharge Date and Time:  02/08/2018 6:40 AM  Attending Physician: No att. providers found   Discharging Provider: Lb Wright MD  Primary Care Provider: Kalia Astorga MD  Hospital Medicine Team: [facility] HOSP MED 3 Lb Wright MD    HPI:   37F with Crohn's disease (diagnosed at age 10) s/p TAC + ileostomy in 2012 on 10 mg pred daily recently stopped stelara 01/24, anxiety, HTN who was recently discharged 02/05 from [facility] for chest pain related to urosepsis (CTA PE negative, trop negative, tele showed NSR x48H). BCx were negative, pt was put on 3 day course of ceftriaxone; UCx resulted 02/05 showing ESBL E coli resistant to ceftriaxone. Bactrim DS BID x 3 days was called in for pt to take outpatient but pt states she is unable to tolerate PO medications due to nausea. Also states she has fatigue, palpitations, malodorous urine, and R flank pain (started 02/05). Denies hematuria, dysuria, CP, SOB, change in ostomy output, color, or odor.    In the ED pt was given 1L NS + Zosyn x1 (02/02 culture shows sensitivity to pip-tazo)    Admitted to Hospital Medicine for treatment / workup of ESBL UTI    * No surgery found *      Hospital Course:   Patient was admitted to hospital medicine team 3 on 2/5/18. She received one dose of zosyn in the ED overnight followed by gentamicin x1 in the morning. She was then switched to Ertapenem per ID recommendations, first dose on 2/6. CT abdomen pelvis showed bilateral renal calculi, largest being 4mm, non-obstructing and no stones in the ureters. No renal abscess. EKG showed prolonged qtc. Her home meds dronabinol and citalopram were discontinued and replaced with duloxetine. C. Diff was negative. Per ID recommendations, she will be discharged without any further antibiotics as she has received 1 dose of  zosyn, 1 dose of gentamicin, and 2 doses of ertapenem. Her U/A was negative, her urine culture is now negative, and her flank pain is resolving. She is to follow up in ID clinic in 7-10 days.     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 Denies CP, SOB, increased ileostomy output  - UCx and blood cx drawn 02/06, consider CT contrast to assess for perinephric abscess given prolonged UTI with inadequate antibiotics  - Given 1L NS in ED, /hr started given decreased PO intake  - Gentamicin x1 on 2/6  - CT A/P shows bilateral nonobstructing renal calculi. No evidence of hydronephrosis or obstructing stone in either ureter. No renal abscess.  - Started Ertapenem 1g q24 on 2/6; final recs on duration per ID  - Patient ok to be discharged without additional antibiotics per ID. She has received adequate treatment, considering her UA on this admission was clean prior to first dose of zosyn in the Ed.  - fluconazole 200 mg 1x dose for yeast infection        Current chronic use of systemic steroids    Pt on pred 10 PO daily, has been on and off prednisone since age 12, cortisol level low on last admit, but was receiving exogenous steroids.  - Restarted home dose prednisone 10 mg PO daily        Generalized anxiety disorder    - Cont home alprazolam PRN  - holding citalopram and dronabinol for qtc  prolongation  - switching citalopram to duloxetine per pharmacy recommendation           Final Active Diagnoses:    Diagnosis Date Noted POA    PRINCIPAL PROBLEM:  UTI due to extended-spectrum beta lactamase (ESBL) producing Escherichia coli [N39.0, A49.8, Z16.12] 02/05/2018 Yes    Non-intractable vomiting with nausea [R11.2] 02/06/2018 Yes    Current chronic use of systemic steroids [Z79.52] 02/03/2018 Not Applicable    Crohn's disease of small intestine with complication [K50.019] 02/21/2017 Yes    Generalized anxiety disorder [F41.1] 09/18/2013 Yes      Problems Resolved During this Admission:    Diagnosis Date Noted Date Resolved POA       Discharged Condition: good    Disposition: Home or Self Care    Follow Up:  Follow-up Information     [facility] - Infectious [facility].    Specialty:  Infectious Diseases  Why:  Clinic will call to schedule an appointment in one week  Contact information:  [address]  [facility] [id number] [phone]  Additional information:  1st Floor - [address]           Kalia Astorga MD.    Specialty:  Internal Medicine  Why:  Follow up with previously scheduled appointment on 2/27  Contact information:  [address]  [facility] [address]  [phone]                 Patient Instructions:     Ambulatory Referral to Infectious Disease   Referral Priority: Urgent Referral Type: Consultation   Referral Reason: Specialty Services Required    Requested Specialty: Infectious Diseases    Number of Visits Requested: 1      Activity as tolerated     Notify your health care provider if you experience any of the following:  temperature >100.4     Notify your health care provider if you experience any of the following:  persistent nausea and vomiting or diarrhea     Notify your health care provider if you experience any of the following:  redness, tenderness, or signs of infection (pain, swelling, redness, odor or green/yellow discharge around incision site)      Notify your health care provider if you experience any of the following:  difficulty breathing or increased cough     Notify your health care provider if you experience any of the following:  severe persistent headache     Notify your health care provider if you experience any of the following:  persistent dizziness, light-headedness, or visual disturbances         Significant Diagnostic Studies: Labs:   BMP:   Recent Labs  Lab 02/07/18  0542   GLU 90      K 3.0*      CO2 28   BUN 8   CREATININE 0.6   CALCIUM 8.2*   MG 1.7   , CMP   Recent Labs  Lab 02/07/18  0542      K 3.0*      CO2 28   GLU 90   BUN 8   CREATININE 0.6   CALCIUM 8.2*   PROT 5.7*   ALBUMIN 2.6*   BILITOT 0.2   ALKPHOS 50*   AST 24   ALT 42   ANIONGAP 6*   ESTGFRAFRICA >60.0   EGFRNONAA >60.0    and CBC   Recent Labs  Lab 02/07/18  0542   WBC 8.12   HGB 10.2*   HCT 30.2*        Microbiology:   Urine Culture    Lab Results   Component Value Date    LABURIN No growth 02/05/2018       Pending Diagnostic Studies:     None         Medications:  Reconciled Home Medications:   Discharge Medication List as of 2/7/2018  3:53 PM      START taking these medications    Details   DULoxetine (CYMBALTA) 30 MG capsule Take 1 capsule (30 mg total) by mouth once daily., Starting Wed 2/7/2018, Until Thu 2/7/2019, Normal         CONTINUE these medications which have NOT CHANGED    Details   acetaminophen (TYLENOL) 650 MG TbSR Take 650 mg by mouth daily as needed (fever/pain)., Historical Med      ALPRAZolam (XANAX) 0.5 MG tablet TAKE 1 TABLET BY MOUTH TWICE A DAY, Normal      biotin 1,000 mcg Chew Take 1 tablet by mouth once daily., Historical Med      diphenoxylate-atropine 2.5-0.025 mg (LOMOTIL) 2.5-0.025 mg per tablet TAKE 1 TABLET BY MOUTH 3 TIMES A DAY AS NEEDED FOR DIARRHEA, Print      dronabinol (MARINOL) 2.5 MG capsule Take 1 capsule (2.5 mg total) by mouth 2 (two) times daily before meals., Starting Tue 1/2/2018, Print       IBUPROFEN ORAL Take 400-600 mg by mouth daily as needed (fever/pain)., Historical Med      lisinopril 10 MG tablet TAKE 1 TABLET (10 MG TOTAL) BY MOUTH ONCE DAILY., Normal      loperamide (IMODIUM) 2 mg capsule Take 2 mg by mouth daily as needed for Diarrhea., Historical Med      multivitamin (ONE DAILY MULTIVITAMIN) per tablet Take 1 tablet by mouth once daily., Historical Med      ondansetron (ZOFRAN-ODT) 8 MG TbDL TAKE 1 TABLET (8 MG TOTAL) BY MOUTH EVERY 8 (EIGHT) HOURS AS NEEDED (NAUSEA)., Starting Mon 1/8/2018, Normal      oxyCODONE-acetaminophen (PERCOCET)  mg per tablet Take 1 tablet by mouth every 6 (six) hours as needed for Pain., Starting Tue 1/9/2018, Print      predniSONE (DELTASONE) 10 MG tablet Take 1 tablet (10 mg total) by mouth once daily., Starting Mon 2/5/2018, Until Thu 2/15/2018, Normal      promethazine (PHENERGAN) 25 MG tablet TAKE 1 TABLET BY MOUTH EVERY 6 HOURS AS NEEDED FOR NAUSEA, Normal      sumatriptan (IMITREX) 100 MG tablet TAKE 1 TABLET BY MOUTH EVERY 2 HOURS AS NEEDED FOR MIGRAINE. DO NOT EXCEED 2 DOSES/ 24HRS, Normal      tacrolimus (PROTOPIC) 0.1 % ointment Apply 1 application topically 2 (two) times daily as needed (for rash on face). , Starting Wed 9/6/2017, Historical Med      zolpidem (AMBIEN) 10 mg Tab TAKE 1 TABLET (10 MG TOTAL) BY MOUTH EVERY EVENING., Starting Tue 2/6/2018, Normal         STOP taking these medications       citalopram (CELEXA) 20 MG tablet Comments:   Reason for Stopping:         citalopram (CELEXA) 20 MG tablet Comments:   Reason for Stopping:         ERTAPENEM SODIUM (ERTAPENEM, INVANZ, 1 G/100 ML NS, READY TO MIX,) Comments:   Reason for Stopping:         metroNIDAZOLE (FLAGYL) 500 MG tablet Comments:   Reason for Stopping:         metroNIDAZOLE (FLAGYL) 500 MG tablet Comments:   Reason for Stopping:               Indwelling Lines/Drains at time of discharge:   Lines/Drains/Airways     Drain                 Ileostomy RUQ -- days                 Time spent on the discharge of patient: 30 minutes  Patient was seen and examined on the date of discharge and determined to be suitable for discharge.         Lb Wright MD  Department of Hospital Medicine  [facility]

## 2018-02-08 NOTE — PROGRESS NOTES
[facility]  Infectious Disease  Progress Note    Patient Name: Ivy Salgado  MRN: [id number]  Admission Date: 2/5/2018  Length of Stay: 2 days  Attending Physician: Jonas Allan MD  Primary Care Provider: Kalia Astorga MD    Isolation Status: No active isolations  Assessment/Plan:      * UTI due to extended-spectrum beta lactamase (ESBL) producing Escherichia coli        37 year old woman with Crohn's disease (diagnosed at age 10) s/p TAC + ileostomy in 2012 and history of chronic, recurrent UTI and kidney stones.  Follows with Dr. Obrien, Urology, at [facility].  Currently on 10 mg prednisone daily (recently stopped Stelara).   Recent admission to [facility] on 2/2 to 2/5 after presenting to ED with chest pain thought to be related to urosepsis (CTA PE negative, troponin negative, EKG wnl). Her U/A was positive and she initially treated with 3 days of IV ceftriaxone, but urine culture, resulted after discharge, showing an ESBL E coli which is resistant to ceftriaxone.  She was prescribed Bactrim DS X 3 days to take as an outpatient, but she was unable to tolerate the medication because of persistent nausea and she re-presented to ED.  On admit she complained of fatigue, right flank pain, low grade fevers and increased stool output.  She was not having her typical UTI symptoms.     U/A on admit negative.  Urine culture taken before antibiotics now shows no growth. She received a dose of zosyn, a dose of gentamicin and 2 doses of ertapenem since admission (after urine culture taken).  She is afebrile. No leukocytosis.   CT abd/pelvis yesterday shows nonobstructing small stone.  No evidence of renal abscess.  Remains without urinary symptoms.  Her right flank pain has pretty much resolved. Blood cultures NGTD.  C diff is negative.  She is stable, non-septic appearing.        -  Recommend no further antibiotics at this time.  Culture on admit, prior to antibiotics,  is negative, as is U/A.  She  "has had multiple doses of antibiotics over the past two days for the prior ESBL E Coli.    Given her history of chronic UTI, concerned with encouraging future increased resistance with unnecessary antibiotics.    -  Follow up with ID in 7-10 days.     -  Patient given our contact information to call if any concerns after discharge and before follow up.      Data reviewed and plan discussed with ID staff  Discussed with Primary Team                  Thank you.   Please call for any questions or concerns.  OFELIA Pritchard, ANP-C  [phone]    Subjective:     Principal Problem:UTI due to extended-spectrum beta lactamase (ESBL) producing Escherichia coli    HPI:        Ms. Delacruz is a 37 year old with Crohn's disease (diagnosed at age 10) s/p TAC + ileostomy in 2012 and chronic, recurrent UTI.   She recently stopped stelara on 1/24 and is on 10 mg prednisone daily.  She was recent admitted to [facility] on 2/2 to 2/5 after presenting to ED with chest pain thought to be related to Urosepsis (CTA PE negative, troponin negative, EKG wnl). Her U/A was positive and she initially treated with 3 days of IV ceftriaxone.  Urine culture resulted after discharge, showing an ESBL E coli which is resistant to cefepime.  She was prescribed Bactrim DS X 3 days to take as an outpatient, but she is unable to tolerate the medication because of persistent nausea and she re-presented to ED.  Also complaining of fatigue, right flank pain, and increased liquid stool output.  She was admitted for treatment of ESBL UTI and further evaluation.      She is not having her usual urinary symptoms of pain/burning. Reports "low grade " fever, 99.5 to 100.0  She has nausea, but no vomiting.    She reports history of kidney stones and follows with Dr. Obrien, Urology, at [facility] for chronic UTI.   Per his notes she was last seen in 11/2017.  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   She reports her current flank pain " is not like her prior kidney stone pain.      At time of visit she is not in acute distress.  She is afebrile.  WBC 13.44.   Hemodynamically stable.   Repeat U/A on 2/5 is clear.   Urine culture pending.  She received dose of gentamicin in the ED and has been started on Ertapenem.  Blood cultures pending.        Interval History:  No acute events overnight.  Afebrile.  Mild leukocytosis on admission has resolved.  U/A on 2/5 admit was negative.  Urine culture taken prior to abx shows no growth.   CT abd/pelvis shows non-obstructive renal stones. No stones in ureters.  No evidence to suggest renal abscess.    She denies complaints. No fever.  No urinary symptoms.  Mild abdominal tenderness she attributes to her Crohn's.  Right flank pain resolved.     Review of Systems   Constitutional: Positive for activity change and appetite change. Negative for chills and fever.   HENT: Negative.    Respiratory: Negative for cough, chest tightness, shortness of breath and wheezing.    Cardiovascular: Positive for palpitations. Negative for chest pain and leg swelling.   Gastrointestinal: Positive for diarrhea. Negative for abdominal pain, constipation, nausea and vomiting.        Ostomy with increased output   Genitourinary: Positive for flank pain (right flank pain resolved ). Negative for decreased urine volume, difficulty urinating, dysuria and hematuria.   Musculoskeletal: Negative for arthralgias and myalgias.   Skin: Negative for rash.   Neurological: Negative for dizziness, weakness and headaches.   Hematological: Negative for adenopathy.   Psychiatric/Behavioral: Negative for agitation.     Objective:     Vital Signs (Most Recent):  Temp: 97 °F (36.1 °C) (02/07/18 1151)  Pulse: 82 (02/07/18 1151)  Resp: 20 (02/07/18 1151)  BP: 133/80 (02/07/18 1151)  SpO2: 99 % (02/07/18 1151) Vital Signs (24h Range):  Temp:  [96.7 °F (35.9 °C)-97.2 °F (36.2 °C)] 97 °F (36.1 °C)  Pulse:  [70-99] 82  Resp:  [16-20] 20  SpO2:  [96 %-99 %]  99 %  BP: (130-141)/(74-89) 133/80     Weight: 47.4 kg (104 lb 8 oz)  Body mass index is 19.75 kg/m².    Estimated Creatinine Clearance: 97.9 mL/min (based on SCr of 0.6 mg/dL).    Physical Exam   Constitutional: She is oriented to person, place, and time. She appears well-developed and well-nourished. No distress.   Eyes: Conjunctivae are normal. No scleral icterus.   Neck: Normal range of motion. Neck supple.   Cardiovascular: Normal rate, regular rhythm and normal heart sounds.    No murmur heard.  Pulmonary/Chest: Effort normal and breath sounds normal. No respiratory distress.   Abdominal: Soft. There is no tenderness.   Ostomy site clear       Musculoskeletal: She exhibits no edema.   Neurological: She is alert and oriented to person, place, and time.   Skin: Skin is warm and dry. No rash noted. She is not diaphoretic.   Psychiatric: She has a normal mood and affect. Her behavior is normal.   Vitals reviewed.      Significant Labs:   Blood Culture:   Recent Labs  Lab 11/20/17  2335 02/02/18  2206 02/02/18  2213 02/06/18  0107 02/06/18  0117   LABBLOO No growth after 5 days.  No growth after 5 days. No Growth to date  No Growth to date  No Growth to date  No Growth to date  No Growth to date No Growth to date  No Growth to date  No Growth to date  No Growth to date  No Growth to date No Growth to date  No Growth to date No Growth to date  No Growth to date     CBC:   Recent Labs  Lab 02/06/18  0001 02/07/18  0542   WBC 13.44* 8.12   HGB 11.1* 10.2*   HCT 33.1* 30.2*    257     CMP:   Recent Labs  Lab 02/06/18  0001 02/07/18  0542    141   K 2.9* 3.0*    107   CO2 26 28   GLU 94 90   BUN 6 8   CREATININE 0.6 0.6   CALCIUM 8.6* 8.2*   PROT 6.5 5.7*   ALBUMIN 3.0* 2.6*   BILITOT 0.2 0.2   ALKPHOS 49* 50*   AST 35 24   ALT 30 42   ANIONGAP 9 6*   EGFRNONAA >60.0 >60.0     Urine Culture:   Recent Labs  Lab 08/22/17  1104 10/16/17  1107 10/23/17  1317 02/02/18  2258 02/05/18  4002    LABURIN No growth ESCHERICHIA COLI ESBL>100,000 cfu/ml No growth ESCHERICHIA COLI ESBL>100,000 cfu/ml No growth     Urine Studies:   Recent Labs  Lab 10/16/17  1108  02/02/18  2258 02/05/18  2334   COLORU Yellow  < > Yellow Straw   APPEARANCEUA Cloudy*  < > Hazy* Clear   PHUR 6.0  < > 5.0 7.0   SPECGRAV 1.025  < > 1.020 1.010   PROTEINUA 2+*  < > Negative Negative   GLUCUA Negative  < > Negative Negative   KETONESU Negative  < > Negative Negative   BILIRUBINUA Negative  < > Negative Negative   OCCULTUA 2+*  < > Negative Negative   NITRITE Positive*  < > Positive* Negative   UROBILINOGEN Negative  < > Negative Negative   LEUKOCYTESUR 2+*  < > 2+* Negative   RBCUA 11*  < > 2  --    WBCUA >100*  < > >100*  --    BACTERIA Many*  < > Many*  --    SQUAMEPITHEL 2  < > 1  --    HYALINECASTS 0  --   --   --    < > = values in this interval not displayed.    Significant Imaging: I have reviewed all pertinent imaging results/findings within the past 24 hours.

## 2018-02-08 NOTE — ASSESSMENT & PLAN NOTE
37 year old woman with Crohn's disease (diagnosed at age 10) s/p TAC + ileostomy in 2012 and history of chronic, recurrent UTI and kidney stones.  Follows with Dr. Obrien, Urology, at [facility].  Currently on 10 mg prednisone daily (recently stopped Stelara).   Recent admission to [facility] on 2/2 to 2/5 after presenting to ED with chest pain thought to be related to urosepsis (CTA PE negative, troponin negative, EKG wnl). Her U/A was positive and she initially treated with 3 days of IV ceftriaxone, but urine culture, resulted after discharge, showing an ESBL E coli which is resistant to ceftriaxone.  She was prescribed Bactrim DS X 3 days to take as an outpatient, but she was unable to tolerate the medication because of persistent nausea and she re-presented to ED.  On admit she complained of fatigue, right flank pain, low grade fevers and increased stool output.  She was not having her typical UTI symptoms.     U/A on admit negative.  Urine culture taken before antibiotics now shows no growth. She received a dose of zosyn, a dose of gentamicin and 2 doses of ertapenem since admission (after urine culture taken).  She is afebrile. No leukocytosis.   CT abd/pelvis yesterday shows nonobstructing small stone.  No evidence of renal abscess.  Remains without urinary symptoms.  Her right flank pain has pretty much resolved. Blood cultures NGTD.  C diff is negative.  She is stable, non-septic appearing.        -  Recommend no further antibiotics at this time.  Culture on admit, prior to antibiotics,  is negative, as is U/A.  She has had multiple doses of antibiotics over the past two days for the prior ESBL E Coli.    Given her history of chronic UTI, concerned with encouraging future increased resistance with unnecessary antibiotics.    -  Follow up with ID in 7-10 days.     -  Patient given our contact information to call if any concerns after discharge and before follow up.      Data reviewed and plan discussed with ID  staff  Discussed with Primary Team

## 2018-02-09 NOTE — PHYSICIAN QUERY
PT Name: Ivy Salgado  MR #: [id number]     Physician Query Form - Diagnosis Clarification      CDS: Suzy Christianson RN     Contact information:  [email or web address]    Phone: [phone]    This form is a permanent document in the medical record.     Query Date: February 9, 2018    By submitting this query, we are merely seeking further clarification of documentation.  Please utilize your independent clinical judgment when addressing the question(s) below.     The medical record contains the following:      Findings Supporting Clinical Information Location in Medical Record   Pyelonephritis     She has nausea and vomiting and is unable to tolerate po antibiotics, which are not adequate therapy for an ESBL-producing organism anyhow.  Her evolving flank pain and vomiting likely represent early pyelonephritis.     UCx resulted 02/05 showing ESBL E coli resistant to ceftriaxone. Bactrim DS BID x 3 days was called in for pt to take outpatient but pt states she is unable to tolerate PO medications due to nausea. Also states she has fatigue, palpitations, malodorous urine, and R flank pain (started 02/05).     Patient was admitted to hospital medicine team 3 on 2/5/18. She received one dose of zosyn in the ED overnight followed by gentamicin x1 in the morning. She was then switched to Ertapenem per ID recommendations, first dose on 2/6. CT abdomen pelvis showed bilateral renal calculi, largest being 4mm, non-obstructing and no stones in the ureters. No renal abscess. EKG showed prolonged qtc. Her home meds dronabinol and citalopram were discontinued and replaced with duloxetine. C. Diff was negative. Per ID recommendations, she will be discharged without any further antibiotics as she has received 1 dose of zosyn, 1 dose of gentamicin, and 2 doses of ertapenem. Her U/A was negative, her urine culture is now negative, and her flank pain is resolving.      H&P 2/6/18         H&P 2/6/18          DS 2/8/18     Please  clarify if the __Pyelonephritis__ diagnosis has been:    [  ] Ruled In  [ X ] Ruled In, Now Resolved  [  ] Ruled Out  [  ] Clinically insignificant  [  ] Clinically undetermined  [  ] Other/Clarification of findings (please specify)_______________________________    Please document in your progress notes daily for the duration of treatment, until resolved, and include in your discharge summary.

## 2018-02-11 LAB
BACTERIA BLD CULT: NORMAL
BACTERIA BLD CULT: NORMAL

## 2018-02-16 ENCOUNTER — HOSPITAL ENCOUNTER (INPATIENT)
Facility: HOSPITAL | Age: 38
LOS: 1 days | Discharge: HOME | DRG: 872 | End: 2018-02-16
Attending: EMERGENCY MEDICINE | Admitting: HOSPITALIST
Payer: COMMERCIAL

## 2018-02-16 DIAGNOSIS — D84.9 IMMUNOSUPPRESSED STATUS: ICD-10-CM

## 2018-02-16 DIAGNOSIS — Z93.2 S/P ILEOSTOMY: ICD-10-CM

## 2018-02-16 DIAGNOSIS — R07.9 CHEST PAIN: ICD-10-CM

## 2018-02-16 DIAGNOSIS — K50.819 CROHN'S DISEASE OF BOTH SMALL AND LARGE INTESTINE WITH COMPLICATION: ICD-10-CM

## 2018-02-16 DIAGNOSIS — A41.9 SEPSIS: ICD-10-CM

## 2018-02-16 DIAGNOSIS — A41.9 SEPSIS, DUE TO UNSPECIFIED ORGANISM: Primary | ICD-10-CM

## 2018-02-16 DIAGNOSIS — R11.10 EMESIS: ICD-10-CM

## 2018-02-16 PROBLEM — R11.2 NAUSEA & VOMITING: Status: ACTIVE | Noted: 2018-02-16

## 2018-02-16 LAB
ALBUMIN SERPL BCP-MCNC: 3.6 G/DL
ALP SERPL-CCNC: 65 U/L
ALT SERPL W/O P-5'-P-CCNC: 28 U/L
ANION GAP SERPL CALC-SCNC: 14 MMOL/L
AST SERPL-CCNC: 24 U/L
BASOPHILS # BLD AUTO: 0.05 K/UL
BASOPHILS NFR BLD: 0.3 %
BILIRUB SERPL-MCNC: 0.4 MG/DL
BILIRUB UR QL STRIP: NEGATIVE
BUN SERPL-MCNC: 14 MG/DL
CALCIUM SERPL-MCNC: 8.8 MG/DL
CHLORIDE SERPL-SCNC: 101 MMOL/L
CLARITY UR REFRACT.AUTO: CLEAR
CO2 SERPL-SCNC: 21 MMOL/L
COLOR UR AUTO: YELLOW
CREAT SERPL-MCNC: 0.7 MG/DL
CRP SERPL-MCNC: 0.4 MG/L
DIFFERENTIAL METHOD: ABNORMAL
EOSINOPHIL # BLD AUTO: 0.1 K/UL
EOSINOPHIL NFR BLD: 0.5 %
ERYTHROCYTE [DISTWIDTH] IN BLOOD BY AUTOMATED COUNT: 13.9 %
ERYTHROCYTE [SEDIMENTATION RATE] IN BLOOD BY WESTERGREN METHOD: 24 MM/HR
EST. GFR  (AFRICAN AMERICAN): >60 ML/MIN/1.73 M^2
EST. GFR  (NON AFRICAN AMERICAN): >60 ML/MIN/1.73 M^2
FLUAV AG SPEC QL IA: NEGATIVE
FLUBV AG SPEC QL IA: NEGATIVE
GLUCOSE SERPL-MCNC: 103 MG/DL
GLUCOSE UR QL STRIP: NEGATIVE
HCT VFR BLD AUTO: 37.4 %
HGB BLD-MCNC: 12.2 G/DL
HGB UR QL STRIP: NEGATIVE
IMM GRANULOCYTES # BLD AUTO: 0.18 K/UL
IMM GRANULOCYTES NFR BLD AUTO: 1.1 %
KETONES UR QL STRIP: NEGATIVE
LACTATE SERPL-SCNC: 1.1 MMOL/L
LACTATE SERPL-SCNC: 3.5 MMOL/L
LEUKOCYTE ESTERASE UR QL STRIP: NEGATIVE
LIPASE SERPL-CCNC: 28 U/L
LYMPHOCYTES # BLD AUTO: 2.9 K/UL
LYMPHOCYTES NFR BLD: 17.6 %
MCH RBC QN AUTO: 29.3 PG
MCHC RBC AUTO-ENTMCNC: 32.6 G/DL
MCV RBC AUTO: 90 FL
MONOCYTES # BLD AUTO: 1.4 K/UL
MONOCYTES NFR BLD: 8.4 %
NEUTROPHILS # BLD AUTO: 12 K/UL
NEUTROPHILS NFR BLD: 72.1 %
NITRITE UR QL STRIP: NEGATIVE
NRBC BLD-RTO: 0 /100 WBC
PH UR STRIP: 6 [PH] (ref 5–8)
PLATELET # BLD AUTO: 323 K/UL
PMV BLD AUTO: 9.1 FL
POTASSIUM SERPL-SCNC: 4.1 MMOL/L
PROCALCITONIN SERPL IA-MCNC: <0.02 NG/ML
PROT SERPL-MCNC: 7.9 G/DL
PROT UR QL STRIP: NEGATIVE
RBC # BLD AUTO: 4.17 M/UL
SODIUM SERPL-SCNC: 136 MMOL/L
SP GR UR STRIP: 1.02 (ref 1–1.03)
SPECIMEN SOURCE: NORMAL
URN SPEC COLLECT METH UR: NORMAL
UROBILINOGEN UR STRIP-ACNC: NEGATIVE EU/DL
WBC # BLD AUTO: 16.63 K/UL

## 2018-02-16 PROCEDURE — 99285 EMERGENCY DEPT VISIT HI MDM: CPT | Mod: ,,, | Performed by: EMERGENCY MEDICINE

## 2018-02-16 PROCEDURE — S0077 INJECTION, CLINDAMYCIN PHOSP: HCPCS | Performed by: PHYSICIAN ASSISTANT

## 2018-02-16 PROCEDURE — 96361 HYDRATE IV INFUSION ADD-ON: CPT

## 2018-02-16 PROCEDURE — 25000003 PHARM REV CODE 250: Performed by: STUDENT IN AN ORGANIZED HEALTH CARE EDUCATION/TRAINING PROGRAM

## 2018-02-16 PROCEDURE — 36415 COLL VENOUS BLD VENIPUNCTURE: CPT

## 2018-02-16 PROCEDURE — 63600175 PHARM REV CODE 636 W HCPCS: Performed by: PHYSICIAN ASSISTANT

## 2018-02-16 PROCEDURE — 20600001 HC STEP DOWN PRIVATE ROOM

## 2018-02-16 PROCEDURE — 87400 INFLUENZA A/B EACH AG IA: CPT | Mod: 59

## 2018-02-16 PROCEDURE — 99285 EMERGENCY DEPT VISIT HI MDM: CPT

## 2018-02-16 PROCEDURE — 83735 ASSAY OF MAGNESIUM: CPT

## 2018-02-16 PROCEDURE — 83605 ASSAY OF LACTIC ACID: CPT

## 2018-02-16 PROCEDURE — 85025 COMPLETE CBC W/AUTO DIFF WBC: CPT

## 2018-02-16 PROCEDURE — 96367 TX/PROPH/DG ADDL SEQ IV INF: CPT

## 2018-02-16 PROCEDURE — 87040 BLOOD CULTURE FOR BACTERIA: CPT | Mod: 59

## 2018-02-16 PROCEDURE — 84145 PROCALCITONIN (PCT): CPT

## 2018-02-16 PROCEDURE — 86140 C-REACTIVE PROTEIN: CPT

## 2018-02-16 PROCEDURE — 80048 BASIC METABOLIC PNL TOTAL CA: CPT

## 2018-02-16 PROCEDURE — 83690 ASSAY OF LIPASE: CPT

## 2018-02-16 PROCEDURE — 25500020 PHARM REV CODE 255: Performed by: EMERGENCY MEDICINE

## 2018-02-16 PROCEDURE — 25000003 PHARM REV CODE 250: Performed by: PHYSICIAN ASSISTANT

## 2018-02-16 PROCEDURE — 85651 RBC SED RATE NONAUTOMATED: CPT

## 2018-02-16 PROCEDURE — 96375 TX/PRO/DX INJ NEW DRUG ADDON: CPT

## 2018-02-16 PROCEDURE — 80053 COMPREHEN METABOLIC PANEL: CPT

## 2018-02-16 PROCEDURE — 96365 THER/PROPH/DIAG IV INF INIT: CPT

## 2018-02-16 PROCEDURE — 81003 URINALYSIS AUTO W/O SCOPE: CPT

## 2018-02-16 PROCEDURE — 84100 ASSAY OF PHOSPHORUS: CPT

## 2018-02-16 PROCEDURE — 99223 1ST HOSP IP/OBS HIGH 75: CPT | Mod: ,,, | Performed by: HOSPITALIST

## 2018-02-16 PROCEDURE — 63600175 PHARM REV CODE 636 W HCPCS: Performed by: STUDENT IN AN ORGANIZED HEALTH CARE EDUCATION/TRAINING PROGRAM

## 2018-02-16 RX ORDER — PROMETHAZINE HYDROCHLORIDE 25 MG/1
25 TABLET ORAL EVERY 6 HOURS PRN
Status: DISCONTINUED | OUTPATIENT
Start: 2018-02-16 | End: 2018-02-17 | Stop reason: HOSPADM

## 2018-02-16 RX ORDER — KETOROLAC TROMETHAMINE 30 MG/ML
10 INJECTION, SOLUTION INTRAMUSCULAR; INTRAVENOUS
Status: COMPLETED | OUTPATIENT
Start: 2018-02-16 | End: 2018-02-16

## 2018-02-16 RX ORDER — METOCLOPRAMIDE 10 MG/1
10 TABLET ORAL
Status: DISCONTINUED | OUTPATIENT
Start: 2018-02-16 | End: 2018-02-16

## 2018-02-16 RX ORDER — SUMATRIPTAN 50 MG/1
100 TABLET, FILM COATED ORAL
Status: DISCONTINUED | OUTPATIENT
Start: 2018-02-16 | End: 2018-02-17 | Stop reason: HOSPADM

## 2018-02-16 RX ORDER — SODIUM CHLORIDE 0.9 % (FLUSH) 0.9 %
5 SYRINGE (ML) INJECTION
Status: DISCONTINUED | OUTPATIENT
Start: 2018-02-16 | End: 2018-02-17 | Stop reason: HOSPADM

## 2018-02-16 RX ORDER — SODIUM CHLORIDE 9 MG/ML
INJECTION, SOLUTION INTRAVENOUS CONTINUOUS
Status: DISCONTINUED | OUTPATIENT
Start: 2018-02-16 | End: 2018-02-17 | Stop reason: HOSPADM

## 2018-02-16 RX ORDER — METOCLOPRAMIDE HYDROCHLORIDE 5 MG/ML
10 INJECTION INTRAMUSCULAR; INTRAVENOUS
Status: COMPLETED | OUTPATIENT
Start: 2018-02-16 | End: 2018-02-16

## 2018-02-16 RX ORDER — PREDNISONE 10 MG/1
10 TABLET ORAL DAILY
Status: DISCONTINUED | OUTPATIENT
Start: 2018-02-16 | End: 2018-02-17 | Stop reason: HOSPADM

## 2018-02-16 RX ORDER — IBUPROFEN 200 MG
16 TABLET ORAL
Status: DISCONTINUED | OUTPATIENT
Start: 2018-02-16 | End: 2018-02-17 | Stop reason: HOSPADM

## 2018-02-16 RX ORDER — GLUCAGON 1 MG
1 KIT INJECTION
Status: DISCONTINUED | OUTPATIENT
Start: 2018-02-16 | End: 2018-02-17 | Stop reason: HOSPADM

## 2018-02-16 RX ORDER — DIPHENHYDRAMINE HCL 25 MG
25 CAPSULE ORAL
Status: COMPLETED | OUTPATIENT
Start: 2018-02-16 | End: 2018-02-16

## 2018-02-16 RX ORDER — DULOXETIN HYDROCHLORIDE 30 MG/1
30 CAPSULE, DELAYED RELEASE ORAL DAILY
Status: DISCONTINUED | OUTPATIENT
Start: 2018-02-16 | End: 2018-02-17 | Stop reason: HOSPADM

## 2018-02-16 RX ORDER — ACETAMINOPHEN 325 MG/1
650 TABLET ORAL EVERY 6 HOURS PRN
Status: DISCONTINUED | OUTPATIENT
Start: 2018-02-16 | End: 2018-02-17 | Stop reason: HOSPADM

## 2018-02-16 RX ORDER — ALPRAZOLAM 0.25 MG/1
0.5 TABLET ORAL 2 TIMES DAILY
Status: DISCONTINUED | OUTPATIENT
Start: 2018-02-16 | End: 2018-02-17

## 2018-02-16 RX ORDER — DRONABINOL 2.5 MG/1
2.5 CAPSULE ORAL
Status: DISCONTINUED | OUTPATIENT
Start: 2018-02-16 | End: 2018-02-17 | Stop reason: HOSPADM

## 2018-02-16 RX ORDER — RAMELTEON 8 MG/1
8 TABLET ORAL NIGHTLY PRN
Status: DISCONTINUED | OUTPATIENT
Start: 2018-02-16 | End: 2018-02-17 | Stop reason: HOSPADM

## 2018-02-16 RX ORDER — IBUPROFEN 200 MG
24 TABLET ORAL
Status: DISCONTINUED | OUTPATIENT
Start: 2018-02-16 | End: 2018-02-17 | Stop reason: HOSPADM

## 2018-02-16 RX ORDER — CLINDAMYCIN PHOSPHATE 600 MG/50ML
600 INJECTION, SOLUTION INTRAVENOUS
Status: COMPLETED | OUTPATIENT
Start: 2018-02-16 | End: 2018-02-16

## 2018-02-16 RX ADMIN — PREDNISONE 10 MG: 10 TABLET ORAL at 03:02

## 2018-02-16 RX ADMIN — SODIUM CHLORIDE: 0.9 INJECTION, SOLUTION INTRAVENOUS at 03:02

## 2018-02-16 RX ADMIN — PROMETHAZINE HYDROCHLORIDE 25 MG: 25 TABLET ORAL at 03:02

## 2018-02-16 RX ADMIN — SODIUM CHLORIDE 1000 ML: 0.9 INJECTION, SOLUTION INTRAVENOUS at 09:02

## 2018-02-16 RX ADMIN — METOCLOPRAMIDE 10 MG: 5 INJECTION, SOLUTION INTRAMUSCULAR; INTRAVENOUS at 09:02

## 2018-02-16 RX ADMIN — PIPERACILLIN AND TAZOBACTAM 4.5 G: 4; .5 INJECTION, POWDER, LYOPHILIZED, FOR SOLUTION INTRAVENOUS; PARENTERAL at 10:02

## 2018-02-16 RX ADMIN — ALPRAZOLAM 0.5 MG: 0.25 TABLET ORAL at 08:02

## 2018-02-16 RX ADMIN — DRONABINOL 2.5 MG: 2.5 CAPSULE ORAL at 04:02

## 2018-02-16 RX ADMIN — DIPHENHYDRAMINE HYDROCHLORIDE 25 MG: 25 CAPSULE ORAL at 09:02

## 2018-02-16 RX ADMIN — DULOXETINE 30 MG: 30 CAPSULE, DELAYED RELEASE ORAL at 03:02

## 2018-02-16 RX ADMIN — PROMETHAZINE HYDROCHLORIDE 12.5 MG: 25 INJECTION INTRAMUSCULAR; INTRAVENOUS at 08:02

## 2018-02-16 RX ADMIN — KETOROLAC TROMETHAMINE 10 MG: 30 INJECTION, SOLUTION INTRAMUSCULAR at 08:02

## 2018-02-16 RX ADMIN — SODIUM CHLORIDE 1000 ML: 0.9 INJECTION, SOLUTION INTRAVENOUS at 08:02

## 2018-02-16 RX ADMIN — ACETAMINOPHEN 650 MG: 325 TABLET, FILM COATED ORAL at 06:02

## 2018-02-16 RX ADMIN — PROMETHAZINE HYDROCHLORIDE 12.5 MG: 25 INJECTION INTRAMUSCULAR; INTRAVENOUS at 09:02

## 2018-02-16 RX ADMIN — SUMATRIPTAN SUCCINATE 100 MG: 50 TABLET ORAL at 11:02

## 2018-02-16 RX ADMIN — CLINDAMYCIN IN 5 PERCENT DEXTROSE 600 MG: 12 INJECTION, SOLUTION INTRAVENOUS at 11:02

## 2018-02-16 RX ADMIN — IOHEXOL 75 ML: 350 INJECTION, SOLUTION INTRAVENOUS at 10:02

## 2018-02-16 NOTE — NURSING
Patient report received from ER nurse, Yudy. Patient came to floor in wheelchair, walked to bed. Vital signs taken.

## 2018-02-16 NOTE — PLAN OF CARE
Kalia Astorga MD   [address] / [place] [address]       [id number] - [place] - [address]  [address]  [place] [address]  Phone: [phone] Fax: [phone]    [facility] (prev. [facility]) - [place] - [address]  [address]  [address]  [place] [address]  Phone: [phone] Fax: [phone]    [facility] [id number] [facility] [address]  Phone: [phone] Fax: [phone]    Payor: BLUE [employer]OSS [employer] / Plan: [employer] / Product Type: Self Funded /         02/16/18 1505   Discharge Assessment   Assessment Type Discharge Planning Assessment   Confirmed/corrected address and phone number on facesheet? Yes   Assessment information obtained from? Patient   Expected Length of Stay (days) 3   Communicated expected length of stay with patient/caregiver yes   Prior to hospitilization cognitive status: Alert/Oriented   Prior to hospitalization functional status: Independent   Current cognitive status: Alert/Oriented   Current Functional Status: Independent   Lives With child(katalina), dependent;parent(s)   Able to Return to Prior Arrangements yes   Is patient able to care for self after discharge? Yes   Patient's perception of discharge disposition home or selfcare   Readmission Within The Last 30 Days current reason for admission unrelated to previous admission   If yes, most recent facility name: [facility]   Patient currently being followed by outpatient case management? No   Patient currently receives any other outside agency services? No   Equipment Currently Used at Home colostomy/ostomy supplies   Do you have any problems affording any of your prescribed medications? No   Is the patient taking medications as prescribed? yes   Does the patient have transportation home? Yes   Transportation Available family or friend will provide    Does the patient receive services at the [facility]? No   Discharge Plan A Home with family   Patient/Family In Agreement With Plan yes   Readmission Questionnaire   At the time of your discharge, did someone talk to you about what your health problems were? Yes   At the time of discharge, did someone talk to you about what to watch out for regarding worsening of your health problem? Yes   At the time of discharge, did someone talk to you about what to do if you experienced worsening of your health problem? Yes   At the time of discharge, did someone talk to you about which medication to take when you left the hospital and which ones to stop taking? Yes   At the time of discharge, did someone talk to you about when and where to follow up with a doctor after you left the hospital? Yes   Do you have problems taking your medications as prescribed? No   Do you have any problems affording any of  your prescribed medications? No   Do you have problems obtaining/receiving your medications? No   Does the patient have family/friends to help with healtcare needs after discharge? yes

## 2018-02-16 NOTE — ED PROVIDER NOTES
Encounter Date: 2/16/2018       History     Chief Complaint   Patient presents with    Emesis     shatesha     This is a 37-year-old female with a past medical history of Crohn's disease status post total colectomy and ileostomy, recurrent UTI, hypertension, nephrolithiasis who presents to the ED with a chief complaint of emesis.  Patient states that she awoke around 4 AM with nausea and multiple episodes of vomiting resembling gastric contents.  She reports generalized abdominal pain associated with this.  She has noticed decreased output in the ostomy bag. She endorses rigors and overall feeling poorly. Patient was recently treated for ESBL Escherichia coli UTI requiring IV Zosyn.  She is not currently on antibiotics and was scheduled to follow-up with infectious disease today.  She is asymptomatic of urinary complaints at this time.  She is currently treated with prednisone 10 mg daily for Crohn's disease, previously failed improvement on multiple medications including Stelera which was discontinued in January 2018. Patient denies fever, chest pain, shortness of breath, hematemesis, dysuria, melena.          Review of patient's allergies indicates:   Allergen Reactions    Vancomycin analogues Hives    Azathioprine sodium      Other reaction(s): pancreatitis  Other reaction(s): pancreatitis    Methotrexate analogues      Past Medical History:   Diagnosis Date    Abnormal Pap smear 2007    Abnormal Pap smear 5/26/2011    Anemia     Anxiety     Arthritis     C. difficile diarrhea     Crohn's disease     Depression 8/5/2017    Encounter for blood transfusion     Genital HSV     History of colposcopy with cervical biopsy 2007 and 7/2011 2007-LYLA I  and 7/2011- LYLA I    Hypertension     Kidney stone     Kidney stone     Recurrent UTI 4/3/2013    S/P ileostomy 7/9/2012    Sterilization 6/23/2012     Past Surgical History:   Procedure Laterality Date    ABDOMINAL SURGERY      APPENDECTOMY       BLADDER SURGERY      partial cystectomy due to fistula    CKC      COLON SURGERY      COLONOSCOPY      HYSTERECTOMY  04/16/2015    SHELLIE    ILEOSTOMY      OOPHORECTOMY Right 04/16/2015    PORTACATH PLACEMENT      SKIN BIOPSY      SMALL INTESTINE SURGERY      TOTAL COLECTOMY      TUBAL LIGATION  06 06 2012    UPPER GASTROINTESTINAL ENDOSCOPY       Family History   Problem Relation Age of Onset    Colon cancer Father     Cancer Father      colon cancer    Hypertension Mother     Cancer Maternal Grandfather      esopghal     Skin cancer Maternal Grandfather     Endometrial cancer Maternal Aunt     Crohn's disease Brother     Breast cancer Neg Hx     Ovarian cancer Neg Hx      Social History   Substance Use Topics    Smoking status: Never Smoker    Smokeless tobacco: Never Used    Alcohol use 0.0 oz/week      Comment: Patient only drinks wine not regular basis.     Review of Systems   Constitutional: Positive for chills. Negative for fever.   HENT: Negative for sore throat.    Respiratory: Negative for shortness of breath.    Cardiovascular: Negative for chest pain.   Gastrointestinal: Positive for abdominal pain, nausea and vomiting.        +Decreased ostomy output   Genitourinary: Negative for dysuria.   Musculoskeletal: Negative for back pain.   Skin: Negative for rash.   Neurological: Negative for weakness.   Hematological: Does not bruise/bleed easily.       Physical Exam     Initial Vitals [02/16/18 0804]   BP Pulse Resp Temp SpO2   136/85 (!) 114 20 98.5 °F (36.9 °C) 100 %      MAP       102         Physical Exam    Constitutional: She appears well-developed and well-nourished. She appears distressed (tachycardic, shaking chills/rigors).   HENT:   Head: Atraumatic.   Mouth/Throat: Mucous membranes are dry.   Eyes: Conjunctivae and EOM are normal. Pupils are equal, round, and reactive to light.   Cardiovascular: Normal rate, regular rhythm and normal heart sounds.   Pulmonary/Chest: Breath  sounds normal. No respiratory distress. She has no wheezes. She has no rhonchi. She has no rales.   Abdominal: Soft. Bowel sounds are normal. She exhibits no distension. There is generalized tenderness. There is no rigidity, no rebound, no guarding and no CVA tenderness.       Neurological: She is alert and oriented to person, place, and time.   Skin: Skin is warm and dry. No rash noted. There is pallor.         ED Course   Procedures  Labs Reviewed   CBC W/ AUTO DIFFERENTIAL - Abnormal; Notable for the following:        Result Value    WBC 16.63 (*)     MPV 9.1 (*)     Immature Granulocytes 1.1 (*)     Gran # (ANC) 12.0 (*)     Immature Grans (Abs) 0.18 (*)     Mono # 1.4 (*)     Lymph% 17.6 (*)     All other components within normal limits   COMPREHENSIVE METABOLIC PANEL - Abnormal; Notable for the following:     CO2 21 (*)     All other components within normal limits   SEDIMENTATION RATE, MANUAL - Abnormal; Notable for the following:     Sed Rate 24 (*)     All other components within normal limits   LACTIC ACID, PLASMA - Abnormal; Notable for the following:     Lactate (Lactic Acid) 3.5 (*)     All other components within normal limits   CULTURE, BLOOD   CULTURE, BLOOD   INFLUENZA A AND B ANTIGEN   LIPASE   C-REACTIVE PROTEIN   URINALYSIS, REFLEX TO URINE CULTURE    Narrative:     Preferred Collection Type->Urine, Clean Catch   LACTIC ACID, PLASMA         Imaging Results          CT Abdomen Pelvis With Contrast (Final result)  Result time 02/16/18 11:04:13    Final result by Gavino Aj MD (02/16/18 11:04:13)                 Impression:      No acute intra-abdominal abnormalities.    Postoperative changes from total colectomy with right lower quadrant ileostomy.    Probable left ovarian cyst.    Nonobstructive bilateral nephrolithiasis.    Simple bilateral renal cysts.                        ______________________________________     Electronically signed by resident: SCARLETT FREGOSO MD  Date:      02/16/18  Time:    10:56            As the supervising and teaching physician, I personally reviewed the images and resident's interpretation and I agree with the findings.            Electronically signed by: Gavino Aj MD  Date:     02/16/18  Time:    11:04              Narrative:    CT ABDOMEN AND PELVIS    INDICATION:  Abdominal pain, unspecified.    TECHNIQUE:  Axial, 5 mm images were obtained of the abdomen and pelvis after the administration of 75 cc of Omnipaque 350 previous contrast and oral contrast. Sagittal and coronal reformats were obtained.    COMPARISON:  CT abdomen and pelvis 2/6/2018.    FINDINGS:  The visualized heart is unremarkable. Lung bases are clear.    Liver, gallbladder, spleen, stomach, pancreas, and adrenal glands are unremarkable.    The small bowel is normal in caliber without evidence of obstruction. There are postoperative changes from a total colectomy with a right lower quadrant ileostomy. No evidence of active bowel inflammation. No free intraperitoneal air.    The kidneys are normal in size. There are bilateral renal hypodensities, too small to characterize but likely simple cysts.    No hydronephrosis. Parenchymal defect at the right renal lower pole is likely scar from prior infarct. There are bilateral nonobstructive renal stones. Largest on the right measures 0.2 cm. The largest on the left measures 0.4 cm. Ureters and urinary bladder are unremarkable. Uterus is surgically absent. There is a cystic left adnexal lesion measuring 3.2 cm, likely a cyst. Small amount of free pelvic fluid also noted.    No lymphadenopathy.    Aorta maintains normal caliber.    Soft tissue show hazy soft tissue density in the bilateral pleural effusions, compatible with prior injections. Osseous structures are unremarkable.                             X-Ray Abdomen Flat And Erect (Final result)  Result time 02/16/18 09:17:49    Final result by Alex Mabry III, MD (02/16/18 09:17:49)                  Narrative:    2 views: The lung bases are clear. No obstruction, ileus, or perforation seen.      Electronically signed by: FREDDIE CHENG MD  Date:     02/16/18  Time:    09:17                                          APC / Resident Notes:   37-year-old female with Crohn's disease status post ileostomy resents with shaking chills, nausea/vomiting, abdominal pain and decreased ostomy output.  On exam she is afebrile, appears to be uncomfortable.  She is tachycardic and has shaking chills.  Her lungs are clear.  Abdomen is soft with diffuse mild tenderness to palpation, no rebound or guarding.  Ileostomy in the right lower quadrant with a small amount of loose stool in the bag.    Labs with leukocytosis, WBC 16.63 with a shift, H/H 12/37.  CMP with normal renal function and lites.  Lactate 3.5, down to 1.1 with fluid resuscitation.  Urinalysis nitrate negative, negative leukocytes.  CT abdomen and pelvis with no acute findings.    Patient improved symptomatically with fluid resuscitation and antiemetics.  Suspect bacteremia, patient started on broad-spectrum antibiotics in the ED and will be admitted to medicine. I discussed the care of this patient with my supervising MD.          Attending Attestation:     Physician Attestation Statement for NP/PA:   I have conducted a face to face encounter with this patient in addition to the NP/PA, due to Medical Complexity    Other NP/PA Attestation Additions:    History of Present Illness: 36 yo f, h/o crohns, immunosuppressed, ileostomy, recent admits for ESBL UTI/sepsis, most recently d/c'd last week, now here for severe chills/weakness since 4 am.  No urinary sx.  + abd pain and decreased ostomy output.  Pt ill-appearing on exam.  Concern for sepsis though on ED work-up, including UA and CT abd/pelvis, no source identified.  Pt with port though and in setting of significant leukocytosis and elevated lactate, could have line sepsis.  Treating with broad spectrum  abx.  Given IVF resuscitation.  Will admit to medicine for further evaluation, presuming that lactate improving.                      Clinical Impression:   The primary encounter diagnosis was Sepsis, due to unspecified organism. A diagnosis of Emesis was also pertinent to this visit.    Disposition:   Disposition: Admitted  Condition: Serious                        Lubna Florez PA-C  02/16/18 1543

## 2018-02-16 NOTE — HOSPITAL COURSE
Upon presentation to the ED patient was tachycardic ~115, not febrile or hypotensive, on exam with generalized abdominal tenderness. Of note in the labs, WBC 16.6k and lactate 3.5, flu negative, u/a, abdominal x-ray and CT scan not indicative of intra-abdominal acute pathology. Received 1L NS bolus + clindamycin 600 mg IV x1 + zosyn 4.5 gr IV x1 + antiemetics. admitted to medicine floor for sepsis. Lactate down to 1.1 after IVF, procal normal, mostly likely viral gastroenteritis. She improved significantly after receiving IVF and N/V was contoled with anti-emetics, was able to tolerate a diet as we advanced it. Of note, she developed one episode of chest pain and tachycardia resembling her previous experience in the last admission, she was extremely anxious and shaking. Received one dose of oral metoprolol 25 which immediately helped to decrease the heart rate, also received one dose of 0.5 oral ativan, since the event was more of an anxiety attack picture rather than ACS. Stat ECG was WNL. She has been fully worked up for this problem previously and has a follow up appointment with cardiology next week. After returning to a calmer state patient was agreeable to discharge.      Review of Systems   Constitutional: Negative for chills, fatigue and fever.   HENT: Negative for ear pain, rhinorrhea, sinus pressure and sore throat.    Eyes: Negative for visual disturbance.   Respiratory: Negative for cough, shortness of breath and wheezing.    Cardiovascular: Negative for chest pain, palpitations and leg swelling.   Gastrointestinal: Positive for nausea (improved significantly). Negative for abdominal pain and vomiting.   Genitourinary: Negative for decreased urine volume, dysuria, frequency, hematuria and urgency.   Musculoskeletal: Negative for arthralgias and myalgias.   Skin: Negative for rash and wound.   Neurological: Negative for dizziness, syncope, weakness, light-headedness and headaches.    Psychiatric/Behavioral: Negative for agitation and confusion. The patient is not nervous/anxious.       Objective:      Vital Signs (Most Recent):  Temp: 98.5 °F (36.9 °C) (02/17/18 0738)  Pulse: 69 (02/17/18 0738)  Resp: 16 (02/17/18 0738)  BP: (!) 140/85 (02/17/18 0738)  SpO2: 95 % (02/17/18 0738) Vital Signs (24h Range):  Temp:  [98.3 °F (36.8 °C)-99.6 °F (37.6 °C)] 98.5 °F (36.9 °C)  Pulse:  [] 69  Resp:  [16-20] 16  SpO2:  [95 %-100 %] 95 %  BP: ()/(50-95) 140/85      Weight: 45.1 kg (99 lb 6.8 oz)  Body mass index is 18.79 kg/m².     Physical Exam   Constitutional: She is oriented to person, place, and time. She is cooperative. She appears ill. No distress.   HENT:   Head: Normocephalic and atraumatic.   Mouth/Throat: Mucous membranes are dry. No uvula swelling. No oropharyngeal exudate.   Mild erythema on uvula and oropharynx likely due to emesis   Eyes: Conjunctivae and EOM are normal. Pupils are equal, round, and reactive to light. No scleral icterus.   Pale conjunctivae   Neck: Normal range of motion. Neck supple.   Cardiovascular: Regular rhythm and normal heart sounds.  Tachycardia present.  Exam reveals no gallop.    No murmur heard.  Pulses:       Radial pulses are 2+ on the right side, and 2+ on the left side.        Dorsalis pedis pulses are 2+ on the right side, and 2+ on the left side.   Pulmonary/Chest: Effort normal and breath sounds normal. No tachypnea. No respiratory distress. She has no decreased breath sounds. She has no wheezes. She has no rhonchi. She has no rales.   Single lumen port in the upper L chest without TTP, erythema or discharge at the site   Abdominal: Soft. Bowel sounds are normal. She exhibits no distension. There is tenderness (mild) in the left upper quadrant and left lower quadrant. There is no guarding and no CVA tenderness.   Ileostomy in the R side of the abdomen, with no TTP, erythema or discharge at the site   Musculoskeletal: She exhibits no edema or  tenderness.   Lymphadenopathy:        Head (right side): No submandibular adenopathy present.        Head (left side): No submandibular adenopathy present.     She has no cervical adenopathy.        Right: No supraclavicular adenopathy present.        Left: No supraclavicular adenopathy present.   Neurological: She is alert and oriented to person, place, and time.   Skin: Skin is warm. No rash noted. She is not diaphoretic. No erythema. There is pallor.   Psychiatric: She has a normal mood and affect. Her speech is normal and behavior is normal. Thought content normal.   Nursing note and vitals reviewed.

## 2018-02-16 NOTE — ED NOTES
Started vomiting and headaches around 0400 this morning.  Reports decreased output to ostomy bag.

## 2018-02-16 NOTE — PLAN OF CARE
Problem: Patient Care Overview  Goal: Plan of Care Review  Outcome: Ongoing (interventions implemented as appropriate)  Patient admitted to floor from ER with nausea and vomiting and dehydration. Patient started on continuous NS. Patient given PO phenergan. Patient started on clear liquid diet. Patient reports some relief from nausea.

## 2018-02-16 NOTE — HPI
The patient is a 38 y/o F with HTN and Crohn's disease s/p total colectomy and ileostomy (2012), and Hx of SHELLIE (due to Hx of endometrial cancer in aunt and sister), partial cystectomy (due to fistula), C.Diff infection and recurrent UTIs who presented to the ED with vomiting, abdominal pain and chills.    The patient woke up early this morning with vomiting food contents associated with generalized abdominal pain and rigors. She also mentions decreased ileostomy output, however denies fever, change in the color or consistency of stool. She mentions eating out last night, but her partner does not have similar symptoms. Denies sick contacts. She denies any flu-like symptoms, cough or change in urinary habits. Recent admission due to ESBL UTI that was treated with IV zosyn (1 week ago, without urinary symptoms at presentation). Patient is on prednisone 10 mg daily for crohn's but not on any other immunosuppressive medication.

## 2018-02-16 NOTE — SUBJECTIVE & OBJECTIVE
Past Medical History:   Diagnosis Date    Abnormal Pap smear 2007    Abnormal Pap smear 5/26/2011    Anemia     Anxiety     Arthritis     C. difficile diarrhea     Crohn's disease     Depression 8/5/2017    Encounter for blood transfusion     Genital HSV     History of colposcopy with cervical biopsy 2007 and 7/2011 2007-LYLA I  and 7/2011- LYLA I    Hypertension     Kidney stone     Kidney stone     Recurrent UTI 4/3/2013    S/P ileostomy 7/9/2012    Sterilization 6/23/2012       Past Surgical History:   Procedure Laterality Date    ABDOMINAL SURGERY      APPENDECTOMY      BLADDER SURGERY      partial cystectomy due to fistula    CKC      COLON SURGERY      COLONOSCOPY      HYSTERECTOMY  04/16/2015    SHELLIE    ILEOSTOMY      OOPHORECTOMY Right 04/16/2015    PORTACATH PLACEMENT      SKIN BIOPSY      SMALL INTESTINE SURGERY      TOTAL COLECTOMY      TUBAL LIGATION  06 06 2012    UPPER GASTROINTESTINAL ENDOSCOPY         Review of patient's allergies indicates:   Allergen Reactions    Vancomycin analogues Hives    Azathioprine sodium      Other reaction(s): pancreatitis  Other reaction(s): pancreatitis    Methotrexate analogues        No current facility-administered medications on file prior to encounter.      Current Outpatient Prescriptions on File Prior to Encounter   Medication Sig    acetaminophen (TYLENOL) 650 MG TbSR Take 650 mg by mouth daily as needed (fever/pain).    ALPRAZolam (XANAX) 0.5 MG tablet TAKE 1 TABLET BY MOUTH TWICE A DAY (Patient taking differently: TAKE 1 TABLET BY MOUTH TWICE A DAY AS NEEDED FOR ANXIETY)    biotin 1,000 mcg Chew Take 1 tablet by mouth once daily.    diphenoxylate-atropine 2.5-0.025 mg (LOMOTIL) 2.5-0.025 mg per tablet TAKE 1 TABLET BY MOUTH 3 TIMES A DAY AS NEEDED FOR DIARRHEA    dronabinol (MARINOL) 2.5 MG capsule Take 1 capsule (2.5 mg total) by mouth 2 (two) times daily before meals.    DULoxetine (CYMBALTA) 30 MG capsule Take 1  capsule (30 mg total) by mouth once daily.    IBUPROFEN ORAL Take 400-600 mg by mouth daily as needed (fever/pain).    lisinopril 10 MG tablet TAKE 1 TABLET (10 MG TOTAL) BY MOUTH ONCE DAILY.    loperamide (IMODIUM) 2 mg capsule Take 2 mg by mouth daily as needed for Diarrhea.    multivitamin (ONE DAILY MULTIVITAMIN) per tablet Take 1 tablet by mouth once daily.    ondansetron (ZOFRAN-ODT) 8 MG TbDL TAKE 1 TABLET (8 MG TOTAL) BY MOUTH EVERY 8 (EIGHT) HOURS AS NEEDED (NAUSEA).    oxyCODONE-acetaminophen (PERCOCET)  mg per tablet Take 1 tablet by mouth every 6 (six) hours as needed for Pain.    promethazine (PHENERGAN) 25 MG tablet TAKE 1 TABLET BY MOUTH EVERY 6 HOURS AS NEEDED FOR NAUSEA    sumatriptan (IMITREX) 100 MG tablet TAKE 1 TABLET BY MOUTH EVERY 2 HOURS AS NEEDED FOR MIGRAINE. DO NOT EXCEED 2 DOSES/ 24HRS    tacrolimus (PROTOPIC) 0.1 % ointment Apply 1 application topically 2 (two) times daily as needed (for rash on face).     zolpidem (AMBIEN) 10 mg Tab TAKE 1 TABLET (10 MG TOTAL) BY MOUTH EVERY EVENING.    [] predniSONE (DELTASONE) 10 MG tablet Take 1 tablet (10 mg total) by mouth once daily.     Family History     Problem Relation (Age of Onset)    Cancer Father, Maternal Grandfather    Colon cancer Father    Crohn's disease Brother    Endometrial cancer Maternal Aunt    Hypertension Mother    Skin cancer Maternal Grandfather        Social History Main Topics    Smoking status: Never Smoker    Smokeless tobacco: Never Used    Alcohol use 0.0 oz/week      Comment: Patient only drinks wine not regular basis.    Drug use: No    Sexual activity: Yes     Partners: Male     Birth control/ protection: Pill, Surgical, Condom      Comment: HYST     Review of Systems   Constitutional: Positive for chills. Negative for fatigue and fever.   HENT: Negative for ear pain, rhinorrhea, sinus pressure and sore throat.    Eyes: Negative for visual disturbance.   Respiratory: Negative for cough,  shortness of breath and wheezing.    Cardiovascular: Negative for chest pain, palpitations and leg swelling.   Gastrointestinal: Positive for abdominal pain, nausea and vomiting.        No change in the stool consistency and color in the ileostomy bag   Genitourinary: Negative for decreased urine volume, dysuria, frequency, hematuria and urgency.   Musculoskeletal: Negative for arthralgias and myalgias.   Skin: Negative for rash and wound.   Neurological: Negative for dizziness, syncope, weakness, light-headedness and headaches.   Psychiatric/Behavioral: Negative for agitation and confusion. The patient is not nervous/anxious.      Objective:     Vital Signs (Most Recent):  Temp: 99.2 °F (37.3 °C) (02/16/18 1356)  Pulse: 108 (02/16/18 1356)  Resp: 16 (02/16/18 1356)  BP: 118/73 (02/16/18 1356)  SpO2: 98 % (02/16/18 1356) Vital Signs (24h Range):  Temp:  [98.5 °F (36.9 °C)-99.2 °F (37.3 °C)] 99.2 °F (37.3 °C)  Pulse:  [] 108  Resp:  [16-20] 16  SpO2:  [97 %-100 %] 98 %  BP: ()/(50-85) 118/73     Weight: 45.1 kg (99 lb 6.8 oz)  Body mass index is 18.79 kg/m².    Physical Exam   Constitutional: She is oriented to person, place, and time. She is cooperative. She appears ill. No distress.   HENT:   Head: Normocephalic and atraumatic.   Mouth/Throat: Mucous membranes are dry. No uvula swelling. No oropharyngeal exudate.   Mild erythema on uvula and oropharynx likely due to emesis   Eyes: Conjunctivae and EOM are normal. Pupils are equal, round, and reactive to light. No scleral icterus.   Pale conjunctivae   Neck: Normal range of motion. Neck supple.   Cardiovascular: Regular rhythm and normal heart sounds.  Tachycardia present.  Exam reveals no gallop.    No murmur heard.  Pulses:       Radial pulses are 2+ on the right side, and 2+ on the left side.        Dorsalis pedis pulses are 2+ on the right side, and 2+ on the left side.   Pulmonary/Chest: Effort normal and breath sounds normal. No tachypnea. No  respiratory distress. She has no decreased breath sounds. She has no wheezes. She has no rhonchi. She has no rales.   Single lumen port in the upper L chest without TTP, erythema or discharge at the site   Abdominal: Soft. Bowel sounds are normal. She exhibits no distension. There is tenderness (mild) in the left upper quadrant and left lower quadrant. There is no guarding and no CVA tenderness.   Ileostomy in the R side of the abdomen, with no TTP, erythema or discharge at the site   Musculoskeletal: She exhibits no edema or tenderness.   Lymphadenopathy:        Head (right side): No submandibular adenopathy present.        Head (left side): No submandibular adenopathy present.     She has no cervical adenopathy.        Right: No supraclavicular adenopathy present.        Left: No supraclavicular adenopathy present.   Neurological: She is alert and oriented to person, place, and time.   Skin: Skin is warm. No rash noted. She is not diaphoretic. No erythema. There is pallor.   Psychiatric: She has a normal mood and affect. Her speech is normal and behavior is normal. Thought content normal.   Nursing note and vitals reviewed.        CRANIAL NERVES     CN III, IV, VI   Pupils are equal, round, and reactive to light.  Extraocular motions are normal.        Significant Labs:   CBC:   Recent Labs  Lab 02/16/18  0832   WBC 16.63*   HGB 12.2   HCT 37.4        CMP:   Recent Labs  Lab 02/16/18  0832      K 4.1      CO2 21*      BUN 14   CREATININE 0.7   CALCIUM 8.8   PROT 7.9   ALBUMIN 3.6   BILITOT 0.4   ALKPHOS 65   AST 24   ALT 28   ANIONGAP 14   EGFRNONAA >60.0     Lactic Acid:   Recent Labs  Lab 02/16/18  0832 02/16/18  1136   LACTATE 3.5* 1.1     TSH:   Recent Labs  Lab 01/27/18  0013   TSH 2.047     Urine Studies:   Recent Labs  Lab 02/16/18  1011   COLORU Yellow   APPEARANCEUA Clear   PHUR 6.0   SPECGRAV 1.025   PROTEINUA Negative   GLUCUA Negative   KETONESU Negative   BILIRUBINUA Negative    OCCULTUA Negative   NITRITE Negative   UROBILINOGEN Negative   LEUKOCYTESUR Negative       Significant Imaging: CT: I have reviewed all pertinent results/findings within the past 24 hours and my personal findings are:  no acute intra-abdominal pathologies

## 2018-02-17 VITALS
WEIGHT: 99.44 LBS | DIASTOLIC BLOOD PRESSURE: 82 MMHG | RESPIRATION RATE: 16 BRPM | OXYGEN SATURATION: 98 % | SYSTOLIC BLOOD PRESSURE: 125 MMHG | HEIGHT: 61 IN | HEART RATE: 74 BPM | TEMPERATURE: 99 F | BODY MASS INDEX: 18.78 KG/M2

## 2018-02-17 LAB
ANION GAP SERPL CALC-SCNC: 5 MMOL/L
BASOPHILS # BLD AUTO: 0.02 K/UL
BASOPHILS NFR BLD: 0.2 %
BUN SERPL-MCNC: 5 MG/DL
CALCIUM SERPL-MCNC: 8.5 MG/DL
CHLORIDE SERPL-SCNC: 109 MMOL/L
CO2 SERPL-SCNC: 25 MMOL/L
CREAT SERPL-MCNC: 0.6 MG/DL
DIFFERENTIAL METHOD: ABNORMAL
EOSINOPHIL # BLD AUTO: 0 K/UL
EOSINOPHIL NFR BLD: 0.3 %
ERYTHROCYTE [DISTWIDTH] IN BLOOD BY AUTOMATED COUNT: 13.9 %
EST. GFR  (AFRICAN AMERICAN): >60 ML/MIN/1.73 M^2
EST. GFR  (NON AFRICAN AMERICAN): >60 ML/MIN/1.73 M^2
GLUCOSE SERPL-MCNC: 94 MG/DL
HCT VFR BLD AUTO: 32.7 %
HGB BLD-MCNC: 10.8 G/DL
IMM GRANULOCYTES # BLD AUTO: 0.03 K/UL
IMM GRANULOCYTES NFR BLD AUTO: 0.3 %
LYMPHOCYTES # BLD AUTO: 2.2 K/UL
LYMPHOCYTES NFR BLD: 18.9 %
MAGNESIUM SERPL-MCNC: 2 MG/DL
MCH RBC QN AUTO: 29.8 PG
MCHC RBC AUTO-ENTMCNC: 33 G/DL
MCV RBC AUTO: 90 FL
MONOCYTES # BLD AUTO: 0.9 K/UL
MONOCYTES NFR BLD: 7.3 %
NEUTROPHILS # BLD AUTO: 8.5 K/UL
NEUTROPHILS NFR BLD: 73 %
NRBC BLD-RTO: 0 /100 WBC
PHOSPHATE SERPL-MCNC: 2.5 MG/DL
PLATELET # BLD AUTO: 234 K/UL
PMV BLD AUTO: 9.1 FL
POTASSIUM SERPL-SCNC: 4.2 MMOL/L
RBC # BLD AUTO: 3.62 M/UL
SODIUM SERPL-SCNC: 139 MMOL/L
WBC # BLD AUTO: 11.64 K/UL

## 2018-02-17 PROCEDURE — 93010 ELECTROCARDIOGRAM REPORT: CPT | Mod: ,,, | Performed by: INTERNAL MEDICINE

## 2018-02-17 PROCEDURE — 25000003 PHARM REV CODE 250: Performed by: STUDENT IN AN ORGANIZED HEALTH CARE EDUCATION/TRAINING PROGRAM

## 2018-02-17 PROCEDURE — 63600175 PHARM REV CODE 636 W HCPCS: Performed by: STUDENT IN AN ORGANIZED HEALTH CARE EDUCATION/TRAINING PROGRAM

## 2018-02-17 PROCEDURE — 93005 ELECTROCARDIOGRAM TRACING: CPT

## 2018-02-17 PROCEDURE — 63600175 PHARM REV CODE 636 W HCPCS: Performed by: HOSPITALIST

## 2018-02-17 PROCEDURE — 99238 HOSP IP/OBS DSCHRG MGMT 30/<: CPT | Mod: ,,, | Performed by: HOSPITALIST

## 2018-02-17 RX ORDER — LORAZEPAM 0.5 MG/1
0.5 TABLET ORAL ONCE
Status: COMPLETED | OUTPATIENT
Start: 2018-02-17 | End: 2018-02-17

## 2018-02-17 RX ORDER — HEPARIN 100 UNIT/ML
3 SYRINGE INTRAVENOUS ONCE
Status: DISCONTINUED | OUTPATIENT
Start: 2018-02-17 | End: 2018-02-17

## 2018-02-17 RX ORDER — HEPARIN SODIUM,PORCINE 10 UNIT/ML
3 VIAL (ML) INTRAVENOUS ONCE
Status: DISCONTINUED | OUTPATIENT
Start: 2018-02-17 | End: 2018-02-17

## 2018-02-17 RX ORDER — ALPRAZOLAM 0.25 MG/1
0.5 TABLET ORAL DAILY PRN
Status: DISCONTINUED | OUTPATIENT
Start: 2018-02-17 | End: 2018-02-17 | Stop reason: HOSPADM

## 2018-02-17 RX ORDER — HEPARIN 100 UNIT/ML
3 SYRINGE INTRAVENOUS ONCE
Status: COMPLETED | OUTPATIENT
Start: 2018-02-17 | End: 2018-02-17

## 2018-02-17 RX ORDER — METOPROLOL TARTRATE 25 MG/1
25 TABLET, FILM COATED ORAL ONCE
Status: COMPLETED | OUTPATIENT
Start: 2018-02-17 | End: 2018-02-17

## 2018-02-17 RX ORDER — PREDNISONE 10 MG/1
10 TABLET ORAL DAILY
Qty: 60 TABLET | Refills: 0
Start: 2018-02-17 | End: 2018-02-27

## 2018-02-17 RX ADMIN — DULOXETINE 30 MG: 30 CAPSULE, DELAYED RELEASE ORAL at 09:02

## 2018-02-17 RX ADMIN — HEPARIN 300 UNITS: 100 SYRINGE at 01:02

## 2018-02-17 RX ADMIN — PREDNISONE 10 MG: 10 TABLET ORAL at 09:02

## 2018-02-17 RX ADMIN — DRONABINOL 2.5 MG: 2.5 CAPSULE ORAL at 05:02

## 2018-02-17 RX ADMIN — LORAZEPAM 0.5 MG: 0.5 TABLET ORAL at 09:02

## 2018-02-17 RX ADMIN — ACETAMINOPHEN 650 MG: 325 TABLET, FILM COATED ORAL at 05:02

## 2018-02-17 RX ADMIN — METOPROLOL TARTRATE 25 MG: 25 TABLET ORAL at 09:02

## 2018-02-17 NOTE — SUBJECTIVE & OBJECTIVE
Past Medical History:   Diagnosis Date    Abnormal Pap smear 2007    Abnormal Pap smear 5/26/2011    Anemia     Anxiety     Arthritis     C. difficile diarrhea     Crohn's disease     Depression 8/5/2017    Encounter for blood transfusion     Genital HSV     History of colposcopy with cervical biopsy 2007 and 7/2011 2007-LYLA I  and 7/2011- LYLA I    Hypertension     Kidney stone     Kidney stone     Recurrent UTI 4/3/2013    S/P ileostomy 7/9/2012    Sterilization 6/23/2012       Past Surgical History:   Procedure Laterality Date    ABDOMINAL SURGERY      APPENDECTOMY      BLADDER SURGERY      partial cystectomy due to fistula    CKC      COLON SURGERY      COLONOSCOPY      HYSTERECTOMY  04/16/2015    SHELLIE    ILEOSTOMY      OOPHORECTOMY Right 04/16/2015    PORTACATH PLACEMENT      SKIN BIOPSY      SMALL INTESTINE SURGERY      TOTAL COLECTOMY      TUBAL LIGATION  06 06 2012    UPPER GASTROINTESTINAL ENDOSCOPY         Review of patient's allergies indicates:   Allergen Reactions    Vancomycin analogues Hives    Azathioprine sodium      Other reaction(s): pancreatitis  Other reaction(s): pancreatitis    Methotrexate analogues        No current facility-administered medications on file prior to encounter.      Current Outpatient Prescriptions on File Prior to Encounter   Medication Sig    acetaminophen (TYLENOL) 650 MG TbSR Take 650 mg by mouth daily as needed (fever/pain).    ALPRAZolam (XANAX) 0.5 MG tablet TAKE 1 TABLET BY MOUTH TWICE A DAY (Patient taking differently: TAKE 1 TABLET BY MOUTH TWICE A DAY AS NEEDED FOR ANXIETY)    biotin 1,000 mcg Chew Take 1 tablet by mouth once daily.    diphenoxylate-atropine 2.5-0.025 mg (LOMOTIL) 2.5-0.025 mg per tablet TAKE 1 TABLET BY MOUTH 3 TIMES A DAY AS NEEDED FOR DIARRHEA    dronabinol (MARINOL) 2.5 MG capsule Take 1 capsule (2.5 mg total) by mouth 2 (two) times daily before meals.    DULoxetine (CYMBALTA) 30 MG capsule Take 1  capsule (30 mg total) by mouth once daily.    IBUPROFEN ORAL Take 400-600 mg by mouth daily as needed (fever/pain).    lisinopril 10 MG tablet TAKE 1 TABLET (10 MG TOTAL) BY MOUTH ONCE DAILY.    loperamide (IMODIUM) 2 mg capsule Take 2 mg by mouth daily as needed for Diarrhea.    multivitamin (ONE DAILY MULTIVITAMIN) per tablet Take 1 tablet by mouth once daily.    oxyCODONE-acetaminophen (PERCOCET)  mg per tablet Take 1 tablet by mouth every 6 (six) hours as needed for Pain.    promethazine (PHENERGAN) 25 MG tablet TAKE 1 TABLET BY MOUTH EVERY 6 HOURS AS NEEDED FOR NAUSEA    sumatriptan (IMITREX) 100 MG tablet TAKE 1 TABLET BY MOUTH EVERY 2 HOURS AS NEEDED FOR MIGRAINE. DO NOT EXCEED 2 DOSES/ 24HRS    tacrolimus (PROTOPIC) 0.1 % ointment Apply 1 application topically 2 (two) times daily as needed (for rash on face).     zolpidem (AMBIEN) 10 mg Tab TAKE 1 TABLET (10 MG TOTAL) BY MOUTH EVERY EVENING.    [DISCONTINUED] ondansetron (ZOFRAN-ODT) 8 MG TbDL TAKE 1 TABLET (8 MG TOTAL) BY MOUTH EVERY 8 (EIGHT) HOURS AS NEEDED (NAUSEA).     Family History     Problem Relation (Age of Onset)    Cancer Father, Maternal Grandfather    Colon cancer Father    Crohn's disease Brother    Endometrial cancer Maternal Aunt    Hypertension Mother    Skin cancer Maternal Grandfather        Social History Main Topics    Smoking status: Never Smoker    Smokeless tobacco: Never Used    Alcohol use 0.0 oz/week      Comment: Patient only drinks wine not regular basis.    Drug use: No    Sexual activity: Yes     Partners: Male     Birth control/ protection: Pill, Surgical, Condom      Comment: HYST     Review of Systems   Constitutional: Negative for chills, fatigue and fever.   HENT: Negative for ear pain, rhinorrhea, sinus pressure and sore throat.    Eyes: Negative for visual disturbance.   Respiratory: Negative for cough, shortness of breath and wheezing.    Cardiovascular: Negative for chest pain, palpitations and  leg swelling.   Gastrointestinal: Positive for nausea (improved significantly). Negative for abdominal pain and vomiting.   Genitourinary: Negative for decreased urine volume, dysuria, frequency, hematuria and urgency.   Musculoskeletal: Negative for arthralgias and myalgias.   Skin: Negative for rash and wound.   Neurological: Negative for dizziness, syncope, weakness, light-headedness and headaches.   Psychiatric/Behavioral: Negative for agitation and confusion. The patient is not nervous/anxious.      Objective:     Vital Signs (Most Recent):  Temp: 98.5 °F (36.9 °C) (02/17/18 0738)  Pulse: 69 (02/17/18 0738)  Resp: 16 (02/17/18 0738)  BP: (!) 140/85 (02/17/18 0738)  SpO2: 95 % (02/17/18 0738) Vital Signs (24h Range):  Temp:  [98.3 °F (36.8 °C)-99.6 °F (37.6 °C)] 98.5 °F (36.9 °C)  Pulse:  [] 69  Resp:  [16-20] 16  SpO2:  [95 %-100 %] 95 %  BP: ()/(50-95) 140/85     Weight: 45.1 kg (99 lb 6.8 oz)  Body mass index is 18.79 kg/m².    Physical Exam   Constitutional: She is oriented to person, place, and time. She is cooperative. She appears ill. No distress.   HENT:   Head: Normocephalic and atraumatic.   Mouth/Throat: Mucous membranes are dry. No uvula swelling. No oropharyngeal exudate.   Mild erythema on uvula and oropharynx likely due to emesis   Eyes: Conjunctivae and EOM are normal. Pupils are equal, round, and reactive to light. No scleral icterus.   Pale conjunctivae   Neck: Normal range of motion. Neck supple.   Cardiovascular: Regular rhythm and normal heart sounds.  Tachycardia present.  Exam reveals no gallop.    No murmur heard.  Pulses:       Radial pulses are 2+ on the right side, and 2+ on the left side.        Dorsalis pedis pulses are 2+ on the right side, and 2+ on the left side.   Pulmonary/Chest: Effort normal and breath sounds normal. No tachypnea. No respiratory distress. She has no decreased breath sounds. She has no wheezes. She has no rhonchi. She has no rales.   Single lumen  port in the upper L chest without TTP, erythema or discharge at the site   Abdominal: Soft. Bowel sounds are normal. She exhibits no distension. There is tenderness (mild) in the left upper quadrant and left lower quadrant. There is no guarding and no CVA tenderness.   Ileostomy in the R side of the abdomen, with no TTP, erythema or discharge at the site   Musculoskeletal: She exhibits no edema or tenderness.   Lymphadenopathy:        Head (right side): No submandibular adenopathy present.        Head (left side): No submandibular adenopathy present.     She has no cervical adenopathy.        Right: No supraclavicular adenopathy present.        Left: No supraclavicular adenopathy present.   Neurological: She is alert and oriented to person, place, and time.   Skin: Skin is warm. No rash noted. She is not diaphoretic. No erythema. There is pallor.   Psychiatric: She has a normal mood and affect. Her speech is normal and behavior is normal. Thought content normal.   Nursing note and vitals reviewed.        CRANIAL NERVES     CN III, IV, VI   Pupils are equal, round, and reactive to light.  Extraocular motions are normal.        Significant Labs:   CBC:     Recent Labs  Lab 02/16/18  0832 02/17/18  0601   WBC 16.63* 11.64   HGB 12.2 10.8*   HCT 37.4 32.7*    234     CMP:     Recent Labs  Lab 02/16/18  0832 02/17/18  0601    139   K 4.1 4.2    109   CO2 21* 25    94   BUN 14 5*   CREATININE 0.7 0.6   CALCIUM 8.8 8.5*   PROT 7.9  --    ALBUMIN 3.6  --    BILITOT 0.4  --    ALKPHOS 65  --    AST 24  --    ALT 28  --    ANIONGAP 14 5*   EGFRNONAA >60.0 >60.0     Lactic Acid:     Recent Labs  Lab 02/16/18  0832 02/16/18  1136   LACTATE 3.5* 1.1     TSH:     Recent Labs  Lab 01/27/18  0013   TSH 2.047     Urine Studies:     Recent Labs  Lab 02/16/18  1011   COLORU Yellow   APPEARANCEUA Clear   PHUR 6.0   SPECGRAV 1.025   PROTEINUA Negative   GLUCUA Negative   KETONESU Negative   BILIRUBINUA Negative    OCCULTUA Negative   NITRITE Negative   UROBILINOGEN Negative   LEUKOCYTESUR Negative       Significant Imaging: CT: I have reviewed all pertinent results/findings within the past 24 hours and my personal findings are:  no acute intra-abdominal pathologies

## 2018-02-17 NOTE — NURSING
Patient is discharged home. Port deaccessed and telemetry removed. AVS education given. Patient will ambulated off of floor.

## 2018-02-17 NOTE — H&P
[facility] Medicine  History & Physical    Patient Name: Ivy Salgado  MRN: [id number]  Admission Date: 2/16/2018  Attending Physician: No att. providers found   Primary Care Provider: Kalia Astorga MD    [facility] Medicine Team: [facility] 2 Asmita Pimentel MD     Patient information was obtained from patient, past medical records and ER records.     Subjective:     Principal Problem:Sepsis    Chief Complaint:   Chief Complaint   Patient presents with    Emesis     shakes        HPI: The patient is a 38 y/o F with HTN and Crohn's disease s/p total colectomy and ileostomy (2012), and Hx of SHELLIE (due to Hx of endometrial cancer in aunt and sister), partial cystectomy (due to fistula), C.Diff infection and recurrent UTIs who presented to the ED with vomiting, abdominal pain and chills.    The patient woke up early this morning with vomiting food contents associated with generalized abdominal pain and rigors. She also mentions decreased ileostomy output, however denies fever, change in the color or consistency of stool. She mentions eating out last night, but her partner does not have similar symptoms. Denies sick contacts. She denies any flu-like symptoms, cough or change in urinary habits. Recent admission due to ESBL UTI that was treated with IV zosyn (1 week ago, without urinary symptoms at presentation). Patient is on prednisone 10 mg daily for crohn's but not on any other immunosuppressive medication.     Upon presentation to the ED patient was tachycardic ~115, not febrile or hypotensive, on exam with generalized abdominal tenderness. Of note in the labs, WBC 16.6k and lactate 3.5, flu negative, u/a, abdominal x-ray and CT scan not indicative of intra-abdominal acute pathology. Received 1L NS bolus + clindamycin 600 mg IV x1 + zosyn 4.5 gr IV x1 + antiemetics. Getting admitted to medicine floor for sepsis.    Past Medical History:   Diagnosis Date    Abnormal Pap smear  2007    Abnormal Pap smear 5/26/2011    Anemia     Anxiety     Arthritis     C. difficile diarrhea     Crohn's disease     Depression 8/5/2017    Encounter for blood transfusion     Genital HSV     History of colposcopy with cervical biopsy 2007 and 7/2011 2007-LYLA I  and 7/2011- LYLA I    Hypertension     Kidney stone     Kidney stone     Recurrent UTI 4/3/2013    S/P ileostomy 7/9/2012    Sterilization 6/23/2012       Past Surgical History:   Procedure Laterality Date    ABDOMINAL SURGERY      APPENDECTOMY      BLADDER SURGERY      partial cystectomy due to fistula    CKC      COLON SURGERY      COLONOSCOPY      HYSTERECTOMY  04/16/2015    SHELLIE    ILEOSTOMY      OOPHORECTOMY Right 04/16/2015    PORTACATH PLACEMENT      SKIN BIOPSY      SMALL INTESTINE SURGERY      TOTAL COLECTOMY      TUBAL LIGATION  06 06 2012    UPPER GASTROINTESTINAL ENDOSCOPY         Review of patient's allergies indicates:   Allergen Reactions    Vancomycin analogues Hives    Azathioprine sodium      Other reaction(s): pancreatitis  Other reaction(s): pancreatitis    Methotrexate analogues        No current facility-administered medications on file prior to encounter.      Current Outpatient Prescriptions on File Prior to Encounter   Medication Sig    acetaminophen (TYLENOL) 650 MG TbSR Take 650 mg by mouth daily as needed (fever/pain).    ALPRAZolam (XANAX) 0.5 MG tablet TAKE 1 TABLET BY MOUTH TWICE A DAY (Patient taking differently: TAKE 1 TABLET BY MOUTH TWICE A DAY AS NEEDED FOR ANXIETY)    biotin 1,000 mcg Chew Take 1 tablet by mouth once daily.    diphenoxylate-atropine 2.5-0.025 mg (LOMOTIL) 2.5-0.025 mg per tablet TAKE 1 TABLET BY MOUTH 3 TIMES A DAY AS NEEDED FOR DIARRHEA    dronabinol (MARINOL) 2.5 MG capsule Take 1 capsule (2.5 mg total) by mouth 2 (two) times daily before meals.    DULoxetine (CYMBALTA) 30 MG capsule Take 1 capsule (30 mg total) by mouth once daily.    IBUPROFEN ORAL Take  400-600 mg by mouth daily as needed (fever/pain).    lisinopril 10 MG tablet TAKE 1 TABLET (10 MG TOTAL) BY MOUTH ONCE DAILY.    loperamide (IMODIUM) 2 mg capsule Take 2 mg by mouth daily as needed for Diarrhea.    multivitamin (ONE DAILY MULTIVITAMIN) per tablet Take 1 tablet by mouth once daily.    ondansetron (ZOFRAN-ODT) 8 MG TbDL TAKE 1 TABLET (8 MG TOTAL) BY MOUTH EVERY 8 (EIGHT) HOURS AS NEEDED (NAUSEA).    oxyCODONE-acetaminophen (PERCOCET)  mg per tablet Take 1 tablet by mouth every 6 (six) hours as needed for Pain.    promethazine (PHENERGAN) 25 MG tablet TAKE 1 TABLET BY MOUTH EVERY 6 HOURS AS NEEDED FOR NAUSEA    sumatriptan (IMITREX) 100 MG tablet TAKE 1 TABLET BY MOUTH EVERY 2 HOURS AS NEEDED FOR MIGRAINE. DO NOT EXCEED 2 DOSES/ 24HRS    tacrolimus (PROTOPIC) 0.1 % ointment Apply 1 application topically 2 (two) times daily as needed (for rash on face).     zolpidem (AMBIEN) 10 mg Tab TAKE 1 TABLET (10 MG TOTAL) BY MOUTH EVERY EVENING.    [] predniSONE (DELTASONE) 10 MG tablet Take 1 tablet (10 mg total) by mouth once daily.     Family History     Problem Relation (Age of Onset)    Cancer Father, Maternal Grandfather    Colon cancer Father    Crohn's disease Brother    Endometrial cancer Maternal Aunt    Hypertension Mother    Skin cancer Maternal Grandfather        Social History Main Topics    Smoking status: Never Smoker    Smokeless tobacco: Never Used    Alcohol use 0.0 oz/week      Comment: Patient only drinks wine not regular basis.    Drug use: No    Sexual activity: Yes     Partners: Male     Birth control/ protection: Pill, Surgical, Condom      Comment: HYST     Review of Systems   Constitutional: Positive for chills. Negative for fatigue and fever.   HENT: Negative for ear pain, rhinorrhea, sinus pressure and sore throat.    Eyes: Negative for visual disturbance.   Respiratory: Negative for cough, shortness of breath and wheezing.    Cardiovascular: Negative for  chest pain, palpitations and leg swelling.   Gastrointestinal: Positive for abdominal pain, nausea and vomiting.        No change in the stool consistency and color in the ileostomy bag   Genitourinary: Negative for decreased urine volume, dysuria, frequency, hematuria and urgency.   Musculoskeletal: Negative for arthralgias and myalgias.   Skin: Negative for rash and wound.   Neurological: Negative for dizziness, syncope, weakness, light-headedness and headaches.   Psychiatric/Behavioral: Negative for agitation and confusion. The patient is not nervous/anxious.      Objective:     Vital Signs (Most Recent):  Temp: 99.2 °F (37.3 °C) (02/16/18 1356)  Pulse: 108 (02/16/18 1356)  Resp: 16 (02/16/18 1356)  BP: 118/73 (02/16/18 1356)  SpO2: 98 % (02/16/18 1356) Vital Signs (24h Range):  Temp:  [98.5 °F (36.9 °C)-99.2 °F (37.3 °C)] 99.2 °F (37.3 °C)  Pulse:  [] 108  Resp:  [16-20] 16  SpO2:  [97 %-100 %] 98 %  BP: ()/(50-85) 118/73     Weight: 45.1 kg (99 lb 6.8 oz)  Body mass index is 18.79 kg/m².    Physical Exam   Constitutional: She is oriented to person, place, and time. She is cooperative. She appears ill. No distress.   HENT:   Head: Normocephalic and atraumatic.   Mouth/Throat: Mucous membranes are dry. No uvula swelling. No oropharyngeal exudate.   Mild erythema on uvula and oropharynx likely due to emesis   Eyes: Conjunctivae and EOM are normal. Pupils are equal, round, and reactive to light. No scleral icterus.   Pale conjunctivae   Neck: Normal range of motion. Neck supple.   Cardiovascular: Regular rhythm and normal heart sounds.  Tachycardia present.  Exam reveals no gallop.    No murmur heard.  Pulses:       Radial pulses are 2+ on the right side, and 2+ on the left side.        Dorsalis pedis pulses are 2+ on the right side, and 2+ on the left side.   Pulmonary/Chest: Effort normal and breath sounds normal. No tachypnea. No respiratory distress. She has no decreased breath sounds. She has no  wheezes. She has no rhonchi. She has no rales.   Single lumen port in the upper L chest without TTP, erythema or discharge at the site   Abdominal: Soft. Bowel sounds are normal. She exhibits no distension. There is tenderness (mild) in the left upper quadrant and left lower quadrant. There is no guarding and no CVA tenderness.   Ileostomy in the R side of the abdomen, with no TTP, erythema or discharge at the site   Musculoskeletal: She exhibits no edema or tenderness.   Lymphadenopathy:        Head (right side): No submandibular adenopathy present.        Head (left side): No submandibular adenopathy present.     She has no cervical adenopathy.        Right: No supraclavicular adenopathy present.        Left: No supraclavicular adenopathy present.   Neurological: She is alert and oriented to person, place, and time.   CN III, IV, VI   Pupils are equal, round, and reactive to light.  Extraocular motions are normal.   Skin: Skin is warm. No rash noted. She is not diaphoretic. No erythema. There is pallor.   Psychiatric: She has a normal mood and affect. Her speech is normal and behavior is normal. Thought content normal.   Nursing note and vitals reviewed.      Significant Labs:   CBC:   Recent Labs  Lab 02/16/18  0832   WBC 16.63*   HGB 12.2   HCT 37.4        CMP:   Recent Labs  Lab 02/16/18  0832      K 4.1      CO2 21*      BUN 14   CREATININE 0.7   CALCIUM 8.8   PROT 7.9   ALBUMIN 3.6   BILITOT 0.4   ALKPHOS 65   AST 24   ALT 28   ANIONGAP 14   EGFRNONAA >60.0     Lactic Acid:   Recent Labs  Lab 02/16/18  0832 02/16/18  1136   LACTATE 3.5* 1.1     TSH:   Recent Labs  Lab 01/27/18  0013   TSH 2.047     Urine Studies:   Recent Labs  Lab 02/16/18  1011   COLORU Yellow   APPEARANCEUA Clear   PHUR 6.0   SPECGRAV 1.025   PROTEINUA Negative   GLUCUA Negative   KETONESU Negative   BILIRUBINUA Negative   OCCULTUA Negative   NITRITE Negative   UROBILINOGEN Negative   LEUKOCYTESUR Negative        Significant Imaging: CT: I have reviewed all pertinent results/findings within the past 24 hours and my personal findings are:  no acute intra-abdominal pathologies    Assessment/Plan:     * Sepsis    - at the time of presentation to the ED /85, , afebrile (SIRS 2/4)  - At the time of the visit BP 94/52, HR 88, not febrile or tachypnic (qSOFA 1/3)  - lactate down to 1.1 from 3.5 after receiving IVF  - WBC 16.6 K  - u/a negative  - abdominal x-ray normal  - CT abdomen no intra-abdominal infectious source, only positive for non-obstructing bilateral nephrolithiasis  - Blood cultures NGTD    Plan:  - will obtain procal  - will start on IV infusion  - will hold off on antibiotics for now, since seems like a viral gastroenteritis vs food poisoning  - will continue to monitor vital signs and labs closely  - if becomes hemodynamically unstable or spikes a fever, will start on antibiotics and consider other sources of infection        Nausea & vomiting    - Most likely gastroenteritis  - Will control symptomatically   - Qtc 479 --> Will try to avoid Ondansetron  - Will treat with Promethazine 25 mg PO q8 + Promethazine 12.5 IV as needed if not able to tolerate PO  - Will start on IVF with NS @100 ml/hr        Essential hypertension, benign    - Home medication:    Lisinopril 10 mg qd  - BP at presentation 136/85, however at the time of the visit ~94/52  - Will hold and continue to monitor BP        Generalized anxiety disorder    - Home medication:   Duloxetine 30 mg qd   Alprazolam 0.5 mg q12   Zolpidem 10 mg qHS  - will continue home dose of the first 2  - will hold Zolpidem for now and replace with Ramelteon 8 mg qHS as needed         Crohn's disease    S/p colectomy and ileostomy  Following up with Dr. Ross in GI department  Not on any immunosuppressive or disease modifying medication due to different adverse reactions to them (leukopenia with MTX, pancreatitis with AZT, rash with Stelara)  On  prednisone 10 mg qd --> Will continue           VTE Risk Mitigation         Ordered     Low Risk of VTE  Once      02/16/18 8425          Asmita Pimentel MD  Department of Hospital Medicine   [facility]

## 2018-02-17 NOTE — ASSESSMENT & PLAN NOTE
- Home medication:    Lisinopril 10 mg qd  - BP at presentation 136/85, however at the time of the visit ~94/52  - Will hold and continue to monitor BP

## 2018-02-17 NOTE — PLAN OF CARE
Problem: Patient Care Overview  Goal: Plan of Care Review  Outcome: Ongoing (interventions implemented as appropriate)  Pt VSS, Aox4, pt c/o of n/v and headache. Headache resolved w/ sumatriptan and n/v resolved with IV promethazine. Pt c/o of chest burning which was also resolved with promethazine. Pt had NS infusing @ 100ml/hr. Pt denies pain and n/v. Pt ub ad neftaly, safety precautions maintained and WCTM.

## 2018-02-17 NOTE — ASSESSMENT & PLAN NOTE
- at the time of presentation to the ED /85, , afebrile (SIRS 2/4)  - At the time of the visit BP 94/52, HR 88, not febrile or tachypnic (qSOFA 1/3)  - lactate down to 1.1 from 3.5 after receiving IVF  - WBC 16.6 K  - u/a negative  - abdominal x-ray normal  - CT abdomen no intra-abdominal infectious source, only positive for non-obstructing bilateral nephrolithiasis  - Blood cultures NGTD    Plan:  - will obtain procal  - will start on IV infusion  - will hold off on antibiotics for now, since seems like a viral gastroenteritis vs food poisoning  - will continue to monitor vital signs and labs closely  - if becomes hemodynamically unstable or spikes a fever, will start on antibiotics and consider other sources of infection

## 2018-02-17 NOTE — ASSESSMENT & PLAN NOTE
- Most likely gastroenteritis  - Will control symptomatically   - Qtc 479 --> Will try to avoid Ondansetron  - Will treat with Promethazine 25 mg PO q8 + Promethazine 12.5 IV as needed if not able to tolerate PO  - Will start on IVF with NS @100 ml/hr

## 2018-02-17 NOTE — ASSESSMENT & PLAN NOTE
- Home medication:   Duloxetine 30 mg qd   Alprazolam 0.5 mg q12   Zolpidem 10 mg qHS  - will continue home dose of the first 2  - will hold Zolpidem for now and replace with Ramelteon 8 mg qHS as needed

## 2018-02-17 NOTE — ASSESSMENT & PLAN NOTE
S/p colectomy and ileostomy  Following up with Dr. Ross in GI department  Not on any immunosuppressive or disease modifying medication due to different adverse reactions to them (leukopenia with MTX, pancreatitis with AZT, rash with Stelara)  On prednisone 10 mg qd --> Will continue

## 2018-02-18 NOTE — ASSESSMENT & PLAN NOTE
- at the time of presentation to the ED /85, , afebrile (SIRS 2/4)  - At the time of the visit BP 94/52, HR 88, not febrile or tachypnic (qSOFA 1/3)  - lactate down to 1.1 from 3.5 after receiving IVF  - WBC 16.6 K, procal normal  - u/a, and abdominal x-ray normal  - CT abdomen no intra-abdominal infectious source, only positive for non-obstructing bilateral nephrolithiasis  - Blood cultures NGTD  - gastroenteritis picture, did not continue on antibiotics, patient improved with IV fluids only  - patient has a port that gets IVF intermittently at [facility] center

## 2018-02-18 NOTE — DISCHARGE SUMMARY
[facility] Hospital Medicine  Discharge Summary      Patient Name: Ivy Salgado  MRN: [id number]  Admission Date: 2/16/2018  Hospital Length of Stay: 1 days  Discharge Date and Time:  02/17/2018 6:35 PM  Attending Physician: Monique Alvarez MD   Discharging Provider: Asmita Pimentel MD  Primary Care Provider: Kalia Astorga MD  Hospital Medicine Team: [facility] HOSP MED 2 Asmita Pimentel MD    HPI:   The patient is a 38 y/o F with HTN and Crohn's disease s/p total colectomy and ileostomy (2012), and Hx of SHELLIE (due to Hx of endometrial cancer in aunt and sister), partial cystectomy (due to fistula), C.Diff infection and recurrent UTIs who presented to the ED with vomiting, abdominal pain and chills.    The patient woke up early this morning with vomiting food contents associated with generalized abdominal pain and rigors. She also mentions decreased ileostomy output, however denies fever, change in the color or consistency of stool. She mentions eating out last night, but her partner does not have similar symptoms. Denies sick contacts. She denies any flu-like symptoms, cough or change in urinary habits. Recent admission due to ESBL UTI that was treated with IV zosyn (1 week ago, without urinary symptoms at presentation). Patient is on prednisone 10 mg daily for crohn's but not on any other immunosuppressive medication.         * No surgery found *      Hospital Course:   Upon presentation to the ED patient was tachycardic ~115, not febrile or hypotensive, on exam with generalized abdominal tenderness. Of note in the labs, WBC 16.6k and lactate 3.5, flu negative, u/a, abdominal x-ray and CT scan not indicative of intra-abdominal acute pathology. Received 1L NS bolus + clindamycin 600 mg IV x1 + zosyn 4.5 gr IV x1 + antiemetics. admitted to medicine floor for sepsis. Lactate down to 1.1 after IVF, procal normal, mostly likely viral gastroenteritis. She improved significantly after receiving IVF  and N/V was contoled with anti-emetics, was able to tolerate a diet as we advanced it. Of note, she developed one episode of chest pain and tachycardia resembling her previous experience in the last admission, she was extremely anxious and shaking. Received one dose of oral metoprolol 25 which immediately helped to decrease the heart rate, also received one dose of 0.5 oral ativan, since the event was more of an anxiety attack picture rather than ACS. Stat ECG was WNL. She has been fully worked up for this problem previously and has a follow up appointment with cardiology next week. After returning to a calmer state patient was agreeable to discharge.      Review of Systems   Constitutional: Negative for chills, fatigue and fever.   HENT: Negative for ear pain, rhinorrhea, sinus pressure and sore throat.    Eyes: Negative for visual disturbance.   Respiratory: Negative for cough, shortness of breath and wheezing.    Cardiovascular: Negative for chest pain, palpitations and leg swelling.   Gastrointestinal: Positive for nausea (improved significantly). Negative for abdominal pain and vomiting.   Genitourinary: Negative for decreased urine volume, dysuria, frequency, hematuria and urgency.   Musculoskeletal: Negative for arthralgias and myalgias.   Skin: Negative for rash and wound.   Neurological: Negative for dizziness, syncope, weakness, light-headedness and headaches.   Psychiatric/Behavioral: Negative for agitation and confusion. The patient is not nervous/anxious.       Objective:      Vital Signs (Most Recent):  Temp: 98.5 °F (36.9 °C) (02/17/18 0738)  Pulse: 69 (02/17/18 0738)  Resp: 16 (02/17/18 0738)  BP: (!) 140/85 (02/17/18 0738)  SpO2: 95 % (02/17/18 0738) Vital Signs (24h Range):  Temp:  [98.3 °F (36.8 °C)-99.6 °F (37.6 °C)] 98.5 °F (36.9 °C)  Pulse:  [] 69  Resp:  [16-20] 16  SpO2:  [95 %-100 %] 95 %  BP: ()/(50-95) 140/85      Weight: 45.1 kg (99 lb 6.8 oz)  Body mass index is 18.79  kg/m².     Physical Exam   Constitutional: She is oriented to person, place, and time. She is cooperative. She appears ill. No distress.   HENT:   Head: Normocephalic and atraumatic.   Mouth/Throat: Mucous membranes are dry. No uvula swelling. No oropharyngeal exudate.   Mild erythema on uvula and oropharynx likely due to emesis   Eyes: Conjunctivae and EOM are normal. Pupils are equal, round, and reactive to light. No scleral icterus.   Pale conjunctivae   Neck: Normal range of motion. Neck supple.   Cardiovascular: Regular rhythm and normal heart sounds.  Tachycardia present.  Exam reveals no gallop.    No murmur heard.  Pulses:       Radial pulses are 2+ on the right side, and 2+ on the left side.        Dorsalis pedis pulses are 2+ on the right side, and 2+ on the left side.   Pulmonary/Chest: Effort normal and breath sounds normal. No tachypnea. No respiratory distress. She has no decreased breath sounds. She has no wheezes. She has no rhonchi. She has no rales.   Single lumen port in the upper L chest without TTP, erythema or discharge at the site   Abdominal: Soft. Bowel sounds are normal. She exhibits no distension. There is tenderness (mild) in the left upper quadrant and left lower quadrant. There is no guarding and no CVA tenderness.   Ileostomy in the R side of the abdomen, with no TTP, erythema or discharge at the site   Musculoskeletal: She exhibits no edema or tenderness.   Lymphadenopathy:        Head (right side): No submandibular adenopathy present.        Head (left side): No submandibular adenopathy present.     She has no cervical adenopathy.        Right: No supraclavicular adenopathy present.        Left: No supraclavicular adenopathy present.   Neurological: She is alert and oriented to person, place, and time.   Skin: Skin is warm. No rash noted. She is not diaphoretic. No erythema. There is pallor.   Psychiatric: She has a normal mood and affect. Her speech is normal and behavior is normal.  Thought content normal.   Nursing note and vitals reviewed.       Consults:     * Sepsis    - at the time of presentation to the ED /85, , afebrile (SIRS 2/4)  - At the time of the visit BP 94/52, HR 88, not febrile or tachypnic (qSOFA 1/3)  - lactate down to 1.1 from 3.5 after receiving IVF  - WBC 16.6 K, procal normal  - u/a, and abdominal x-ray normal  - CT abdomen no intra-abdominal infectious source, only positive for non-obstructing bilateral nephrolithiasis  - Blood cultures NGTD  - gastroenteritis picture, did not continue on antibiotics, patient improved with IV fluids only  - patient has a port that gets IVF intermittently at infusion center        Nausea & vomiting    - controlled with scheduled phenergan and IVF  - able to tolerate diet  - will send home with prescription for phenergan        Essential hypertension, benign    - Home medication:    Lisinopril 10 mg qd  - BP at presentation 136/85, however at the time of the visit ~94/52  - held lisinopril while in patient, will resume on discharge        Generalized anxiety disorder    - Home medication:   Duloxetine 30 mg qd   Alprazolam 0.5 mg q12   Zolpidem 10 mg qHS  - will resume all on D/C         Crohn's disease    S/p colectomy and ileostomy  Following up with Dr. Ross in GI department  Not on any immunosuppressive or disease modifying medication due to different adverse reactions to them (leukopenia with MTX, pancreatitis with AZT, rash with Stelara)  On prednisone 10 mg qd --> Will continue upon discharge (did not prescription)          Final Active Diagnoses:    Diagnosis Date Noted POA    PRINCIPAL PROBLEM:  Sepsis [A41.9] 02/16/2018 Yes    Nausea & vomiting [R11.2] 02/16/2018 Yes    Essential hypertension, benign [I10] 03/17/2016 Yes     Chronic    Crohn's disease [K50.90] 09/18/2013 Yes     Chronic    Generalized anxiety disorder [F41.1] 09/18/2013 Yes      Problems Resolved During this Admission:    Diagnosis Date Noted  Date Resolved POA       Discharged Condition: good    Disposition: Home or Self Care    Follow Up:  Follow-up Information     Kalia Astorga MD.    Specialty:  Internal Medicine  Contact information:  [address]  [place] [id number] [phone]                 Patient Instructions:   Follow up with cardiology per your appointment    Significant Diagnostic Studies: Labs:   CMP   Recent Labs  Lab 02/16/18  0832 02/17/18  0601    139   K 4.1 4.2    109   CO2 21* 25    94   BUN 14 5*   CREATININE 0.7 0.6   CALCIUM 8.8 8.5*   PROT 7.9  --    ALBUMIN 3.6  --    BILITOT 0.4  --    ALKPHOS 65  --    AST 24  --    ALT 28  --    ANIONGAP 14 5*   ESTGFRAFRICA >60.0 >60.0   EGFRNONAA >60.0 >60.0    and CBC   Recent Labs  Lab 02/16/18  0832 02/17/18  0601   WBC 16.63* 11.64   HGB 12.2 10.8*   HCT 37.4 32.7*    234     Microbiology:   Blood Culture   Lab Results   Component Value Date    LABBLOO No Growth to date 02/16/2018    LABBLOO No Growth to date 02/16/2018     Radiology: X-Ray: abdominal x-ray: WNL  CT scan:  CT ABDOMEN PELVIS WITH CONTRAST:   Results for orders placed or performed during the hospital encounter of 02/16/18   CT Abdomen Pelvis With Contrast    Narrative    CT ABDOMEN AND PELVIS    INDICATION:  Abdominal pain, unspecified.    TECHNIQUE:  Axial, 5 mm images were obtained of the abdomen and pelvis after the administration of 75 cc of Omnipaque 350 previous contrast and oral contrast. Sagittal and coronal reformats were obtained.    COMPARISON:  CT abdomen and pelvis 2/6/2018.    FINDINGS:  The visualized heart is unremarkable. Lung bases are clear.  Liver, gallbladder, spleen, stomach, pancreas, and adrenal glands are unremarkable.  The small bowel is normal in caliber without evidence of obstruction. There are postoperative changes from a total colectomy with a right lower quadrant ileostomy. No evidence of active bowel inflammation. No free intraperitoneal air.  The  kidneys are normal in size. There are bilateral renal hypodensities, too small to characterize but likely simple cysts.  No hydronephrosis. Parenchymal defect at the right renal lower pole is likely scar from prior infarct. There are bilateral nonobstructive renal stones. Largest on the right measures 0.2 cm. The largest on the left measures 0.4 cm. Ureters and urinary bladder are unremarkable. Uterus is surgically absent. There is a cystic left adnexal lesion measuring 3.2 cm, likely a cyst. Small amount of free pelvic fluid also noted.    No lymphadenopathy.  Aorta maintains normal caliber.  Soft tissue show hazy soft tissue density in the bilateral pleural effusions, compatible with prior injections. Osseous structures are unremarkable.    Impression    No acute intra-abdominal abnormalities.    Postoperative changes from total colectomy with right lower quadrant ileostomy.  Probable left ovarian cyst.  Nonobstructive bilateral nephrolithiasis.  Simple bilateral renal cysts.  ____________________________     Electronically signed by resident: SCARLETT FREGOSO MD  Date:     02/16/18  Time:    10:56    As the supervising and teaching physician, I personally reviewed the images and resident's interpretation and I agree with the findings.    Electronically signed by: Gavino Aj MD  Date:     02/16/18  Time:    11:04        Cardiac Graphics: ECG: NSR, Qtc 479    Pending Diagnostic Studies:     None         Medications:  Reconciled Home Medications:   Discharge Medication List as of 2/17/2018 11:59 AM      CONTINUE these medications which have CHANGED    Details   predniSONE (DELTASONE) 10 MG tablet Take 1 tablet (10 mg total) by mouth once daily., Starting Sat 2/17/2018, Until Tue 2/27/2018, No Print         CONTINUE these medications which have NOT CHANGED    Details   acetaminophen (TYLENOL) 650 MG TbSR Take 650 mg by mouth daily as needed (fever/pain)., Historical Med      ALPRAZolam (XANAX) 0.5 MG tablet TAKE 1  TABLET BY MOUTH TWICE A DAY, Normal      biotin 1,000 mcg Chew Take 1 tablet by mouth once daily., Historical Med      diphenoxylate-atropine 2.5-0.025 mg (LOMOTIL) 2.5-0.025 mg per tablet TAKE 1 TABLET BY MOUTH 3 TIMES A DAY AS NEEDED FOR DIARRHEA, Print      dronabinol (MARINOL) 2.5 MG capsule Take 1 capsule (2.5 mg total) by mouth 2 (two) times daily before meals., Starting Tue 1/2/2018, Print      DULoxetine (CYMBALTA) 30 MG capsule Take 1 capsule (30 mg total) by mouth once daily., Starting Wed 2/7/2018, Until Thu 2/7/2019, Normal      IBUPROFEN ORAL Take 400-600 mg by mouth daily as needed (fever/pain)., Historical Med      lisinopril 10 MG tablet TAKE 1 TABLET (10 MG TOTAL) BY MOUTH ONCE DAILY., Normal      loperamide (IMODIUM) 2 mg capsule Take 2 mg by mouth daily as needed for Diarrhea., Historical Med      multivitamin (ONE DAILY MULTIVITAMIN) per tablet Take 1 tablet by mouth once daily., Historical Med      oxyCODONE-acetaminophen (PERCOCET)  mg per tablet Take 1 tablet by mouth every 6 (six) hours as needed for Pain., Starting Thu 2/8/2018, Print      promethazine (PHENERGAN) 25 MG tablet TAKE 1 TABLET BY MOUTH EVERY 6 HOURS AS NEEDED FOR NAUSEA, Normal      sumatriptan (IMITREX) 100 MG tablet TAKE 1 TABLET BY MOUTH EVERY 2 HOURS AS NEEDED FOR MIGRAINE. DO NOT EXCEED 2 DOSES/ 24HRS, Normal      tacrolimus (PROTOPIC) 0.1 % ointment Apply 1 application topically 2 (two) times daily as needed (for rash on face). , Starting Wed 9/6/2017, Historical Med      zolpidem (AMBIEN) 10 mg Tab TAKE 1 TABLET (10 MG TOTAL) BY MOUTH EVERY EVENING., Starting Tue 2/6/2018, Normal         STOP taking these medications       ondansetron (ZOFRAN-ODT) 8 MG TbDL Comments:   Reason for Stopping:               Indwelling Lines/Drains at time of discharge:   Lines/Drains/Airways     Central Venous Catheter Line                 Port A Cath Single Lumen 02/16/18 0853 left subclavian 1 day          Drain                  Ileostomy 06/16/12 0000 RLQ 2072 days                Time spent on the discharge of patient: 45 minutes  Patient was seen and examined on the date of discharge and determined to be suitable for discharge.    Asmita Pimentel MD  Department of Hospital Medicine  [facility]

## 2018-02-18 NOTE — ASSESSMENT & PLAN NOTE
- controlled with scheduled phenergan and IVF  - able to tolerate diet  - will send home with prescription for phenergan

## 2018-02-18 NOTE — ASSESSMENT & PLAN NOTE
- Home medication:   Duloxetine 30 mg qd   Alprazolam 0.5 mg q12   Zolpidem 10 mg qHS  - will resume all on D/C

## 2018-02-18 NOTE — ASSESSMENT & PLAN NOTE
S/p colectomy and ileostomy  Following up with Dr. Ross in GI department  Not on any immunosuppressive or disease modifying medication due to different adverse reactions to them (leukopenia with MTX, pancreatitis with AZT, rash with Stelara)  On prednisone 10 mg qd --> Will continue upon discharge (did not prescription)

## 2018-02-19 ENCOUNTER — PATIENT MESSAGE (OUTPATIENT)
Dept: INTERNAL MEDICINE | Facility: CLINIC | Age: 38
End: 2018-02-19

## 2018-02-19 DIAGNOSIS — F41.1 GENERALIZED ANXIETY DISORDER: Primary | ICD-10-CM

## 2018-02-19 RX ORDER — ALPRAZOLAM 1 MG/1
1 TABLET ORAL 2 TIMES DAILY PRN
Qty: 60 TABLET | Refills: 0 | Status: SHIPPED | OUTPATIENT
Start: 2018-02-19 | End: 2018-03-19 | Stop reason: SDUPTHER

## 2018-02-20 ENCOUNTER — TELEPHONE (OUTPATIENT)
Dept: GASTROENTEROLOGY | Facility: CLINIC | Age: 38
End: 2018-02-20

## 2018-02-20 ENCOUNTER — OFFICE VISIT (OUTPATIENT)
Dept: CARDIOLOGY | Facility: CLINIC | Age: 38
End: 2018-02-20
Payer: COMMERCIAL

## 2018-02-20 VITALS
HEART RATE: 120 BPM | OXYGEN SATURATION: 100 % | BODY MASS INDEX: 18.02 KG/M2 | SYSTOLIC BLOOD PRESSURE: 156 MMHG | DIASTOLIC BLOOD PRESSURE: 90 MMHG | HEIGHT: 61 IN | WEIGHT: 95.44 LBS

## 2018-02-20 DIAGNOSIS — R00.2 PALPITATION: Primary | ICD-10-CM

## 2018-02-20 PROCEDURE — 99999 PR PBB SHADOW E&M-EST. PATIENT-LVL III: CPT | Mod: PBBFAC,,, | Performed by: INTERNAL MEDICINE

## 2018-02-20 PROCEDURE — 99204 OFFICE O/P NEW MOD 45 MIN: CPT | Mod: S$GLB,,, | Performed by: INTERNAL MEDICINE

## 2018-02-20 PROCEDURE — 3008F BODY MASS INDEX DOCD: CPT | Mod: S$GLB,,, | Performed by: INTERNAL MEDICINE

## 2018-02-20 NOTE — TELEPHONE ENCOUNTER
----- Message from Patti Ross sent at 2/20/2018  1:02 PM CST -----  Contact: Self   Pt is requesting a call back in regards to scheduling an infusion       Pt can be contacted at [phone]

## 2018-02-20 NOTE — PROGRESS NOTES
Subjective:    Patient ID:  Ivy Salgado is a 37 y.o. female who presents for evaluation of palpitations    37 year old female patient with Crohn's disease S/p Ileostomy has had 3-4 admissions to the hospital in the past 2 months complaining of CP and palpitations. She reports that she has chest pain- substernal and followed by palpitations with her heart racing hard. She did report that she had lost a good amount of fluids thru the ileostomy and was dehydrated hence trying to drink a lot of water of late. She had ESBL infection and was also treated for 2 weeks with Abx. for urosepsis   CP focal substernal lasting couple of minutes, occurring 10-15 times a day not related to exertion, resolves when her palpitations resolve.    She was again admitted 2/17/18 with nausea/vomiting and mild abdominal pain in the setting of tachycardia, lactic acidosis, and She was treated for possible  acute viral gastroenteritis. Patient reports going out to dinner the night before presentation and began to have symptoms shortly thereafter. She improved with IVF hydration, anti-emetics, and supportive care and discharged and advised to follow up Cardiology.      Past Medical History:   Diagnosis Date    Abnormal Pap smear 2007    Abnormal Pap smear 5/26/2011    Anemia     Anxiety     Arthritis     C. difficile diarrhea     Crohn's disease     Depression 8/5/2017    Encounter for blood transfusion     Genital HSV     History of colposcopy with cervical biopsy 2007 and 7/2011 2007-LYLA I  and 7/2011- LYLA I    Hypertension     Kidney stone     Kidney stone     Recurrent UTI 4/3/2013    S/P ileostomy 7/9/2012    Sterilization 6/23/2012     Past Surgical History:   Procedure Laterality Date    ABDOMINAL SURGERY      APPENDECTOMY      BLADDER SURGERY      partial cystectomy due to fistula    [facility]      COLON SURGERY      COLONOSCOPY      HYSTERECTOMY  04/16/2015    SHELLIE    ILEOSTOMY      OOPHORECTOMY Right  04/16/2015    PORTACATH PLACEMENT      SKIN BIOPSY      SMALL INTESTINE SURGERY      TOTAL COLECTOMY      TUBAL LIGATION  06 06 2012    UPPER GASTROINTESTINAL ENDOSCOPY       Review of patient's allergies indicates:   Allergen Reactions    Vancomycin analogues Hives    Azathioprine sodium      Other reaction(s): pancreatitis  Other reaction(s): pancreatitis    Methotrexate analogues        Current Outpatient Prescriptions on File Prior to Visit   Medication Sig Dispense Refill    acetaminophen (TYLENOL) 650 MG TbSR Take 650 mg by mouth daily as needed (fever/pain).      ALPRAZolam (XANAX) 1 MG tablet Take 1 tablet (1 mg total) by mouth 2 (two) times daily as needed for Anxiety. 60 tablet 0    biotin 1,000 mcg Chew Take 1 tablet by mouth once daily.      diphenoxylate-atropine 2.5-0.025 mg (LOMOTIL) 2.5-0.025 mg per tablet TAKE 1 TABLET BY MOUTH 3 TIMES A DAY AS NEEDED FOR DIARRHEA 90 tablet 0    dronabinol (MARINOL) 2.5 MG capsule Take 1 capsule (2.5 mg total) by mouth 2 (two) times daily before meals. 60 capsule 3    DULoxetine (CYMBALTA) 30 MG capsule Take 1 capsule (30 mg total) by mouth once daily. 30 capsule 11    IBUPROFEN ORAL Take 400-600 mg by mouth daily as needed (fever/pain).      lisinopril 10 MG tablet TAKE 1 TABLET (10 MG TOTAL) BY MOUTH ONCE DAILY. 90 tablet 2    loperamide (IMODIUM) 2 mg capsule Take 2 mg by mouth daily as needed for Diarrhea.      multivitamin (ONE DAILY MULTIVITAMIN) per tablet Take 1 tablet by mouth once daily.      oxyCODONE-acetaminophen (PERCOCET)  mg per tablet Take 1 tablet by mouth every 6 (six) hours as needed for Pain. 40 tablet 0    predniSONE (DELTASONE) 10 MG tablet Take 1 tablet (10 mg total) by mouth once daily. 60 tablet 0    promethazine (PHENERGAN) 25 MG tablet TAKE 1 TABLET BY MOUTH EVERY 6 HOURS AS NEEDED FOR NAUSEA 30 tablet 3    sumatriptan (IMITREX) 100 MG tablet TAKE 1 TABLET BY MOUTH EVERY 2 HOURS AS NEEDED FOR MIGRAINE. DO NOT  "EXCEED 2 DOSES/ 24HRS 9 tablet 1    tacrolimus (PROTOPIC) 0.1 % ointment Apply 1 application topically 2 (two) times daily as needed (for rash on face).   4    zolpidem (AMBIEN) 10 mg Tab TAKE 1 TABLET (10 MG TOTAL) BY MOUTH EVERY EVENING. 30 tablet 0    [DISCONTINUED] ALPRAZolam (XANAX) 0.5 MG tablet TAKE 1 TABLET BY MOUTH TWICE A DAY (Patient taking differently: TAKE 1 TABLET BY MOUTH TWICE A DAY AS NEEDED FOR ANXIETY) 60 tablet 0     No current facility-administered medications on file prior to visit.      Review of Systems   Constitution: Negative for malaise/fatigue.   HENT: Negative.    Eyes: Negative.    Cardiovascular: Positive for chest pain, irregular heartbeat and palpitations. Negative for claudication, dyspnea on exertion, leg swelling and orthopnea.   Respiratory: Negative for shortness of breath.    Skin: Negative.    Musculoskeletal: Negative.    Gastrointestinal: Positive for abdominal pain.        Has an ileostomy- had increased outputs recently   Genitourinary: Negative.    Neurological: Negative.    Psychiatric/Behavioral: Negative.    BP (!) 156/90 (BP Location: Left arm, Patient Position: Sitting, BP Method: Small (Automatic))   Pulse (!) 120   Ht 5' 1" (1.549 m)   Wt 43.3 kg (95 lb 7.4 oz)   LMP 03/30/2015   SpO2 100%   BMI 18.04 kg/m²      Objective:    Physical Exam   Constitutional: She is oriented to person, place, and time. She appears well-developed and well-nourished.   HENT:   Head: Normocephalic and atraumatic.   Eyes: Conjunctivae are normal. Pupils are equal, round, and reactive to light.   Neck: Normal range of motion. Neck supple. No JVD present.   Cardiovascular: Normal rate, regular rhythm and normal heart sounds.  Exam reveals no friction rub.    No murmur heard.  Abdominal: Soft. Bowel sounds are normal. She exhibits no distension. There is no tenderness.   Ileostomy in place   Musculoskeletal: Normal range of motion. She exhibits no edema.   Neurological: She is " alert and oriented to person, place, and time.   Skin: Skin is warm and dry.   Psychiatric: She has a normal mood and affect. Her behavior is normal.         Assessment:       palpitations  Chest pain: non cardiac  Crohn's disease: S/p ileostomy    Plan:       37 year old young female patient with complaints of CP/Palpittaions.   Patient has no cardiovascular risk factors except for HTN- which is controled on ACEi. Multiple EKGs and troponin have been negative in recent hospitalizations at [facility] and [facility]. She had a negative CTA ruling out PE on 1/27/18. Her description of symptoms is not consistent with cardiac etiology.    Palpitations: Will get a 48 hr Holter monitor study.    RTC 4-6 weeks.    D/w Dr AMY Monsalve.    John Saucedo MD, MPH  Cardiovascular Disease Fellow  Pager: [phone]

## 2018-02-20 NOTE — PHYSICIAN QUERY
PT Name: Ivy Salgado  MR #: [id number]     Physician Query Form - Documentation Clarification      CDS: Suzy Christianson RN     Contact information:  [email or web address]    Phone: [phone]    This form is a permanent document in the medical record.     Query Date: February 20, 2018    By submitting this query, we are merely seeking further clarification of documentation. Please utilize your independent clinical judgment when addressing the question(s) below.    The Medical record reflects the following:    Supporting Clinical Findings Location in Medical Record     magnesium sulfate 2g in water 50mL IVPB (premix) Dose: 2 g Freq: Once Route: IV    Mag= 1.4          MAR: 02/03/18 0844       Lab 2/3/15                                                                                      Doctor, Please specify diagnosis or diagnoses associated with above clinical findings.    Provider Use Only                [ x ] Hypomagnesemia           [  ] Other __________________                                                                                                                     [  ] Clinically undetermined

## 2018-02-20 NOTE — TELEPHONE ENCOUNTER
Patient needed to speak with the ID infusion nurses to set up an appointment to get fluids.  Transferred patient to that department.

## 2018-02-21 ENCOUNTER — PATIENT MESSAGE (OUTPATIENT)
Dept: INTERNAL MEDICINE | Facility: CLINIC | Age: 38
End: 2018-02-21

## 2018-02-21 ENCOUNTER — OFFICE VISIT (OUTPATIENT)
Dept: INTERNAL MEDICINE | Facility: CLINIC | Age: 38
End: 2018-02-21
Payer: COMMERCIAL

## 2018-02-21 VITALS
BODY MASS INDEX: 17.98 KG/M2 | SYSTOLIC BLOOD PRESSURE: 90 MMHG | HEIGHT: 61 IN | HEART RATE: 96 BPM | DIASTOLIC BLOOD PRESSURE: 62 MMHG | WEIGHT: 95.25 LBS

## 2018-02-21 DIAGNOSIS — N39.0 UTI DUE TO EXTENDED-SPECTRUM BETA LACTAMASE (ESBL) PRODUCING ESCHERICHIA COLI: ICD-10-CM

## 2018-02-21 DIAGNOSIS — B96.29 UTI DUE TO EXTENDED-SPECTRUM BETA LACTAMASE (ESBL) PRODUCING ESCHERICHIA COLI: ICD-10-CM

## 2018-02-21 DIAGNOSIS — R00.0 TACHYCARDIA: Primary | ICD-10-CM

## 2018-02-21 DIAGNOSIS — Z16.12 UTI DUE TO EXTENDED-SPECTRUM BETA LACTAMASE (ESBL) PRODUCING ESCHERICHIA COLI: ICD-10-CM

## 2018-02-21 DIAGNOSIS — F41.1 GENERALIZED ANXIETY DISORDER: ICD-10-CM

## 2018-02-21 LAB
BACTERIA BLD CULT: NORMAL
BACTERIA BLD CULT: NORMAL

## 2018-02-21 PROCEDURE — 99214 OFFICE O/P EST MOD 30 MIN: CPT | Mod: S$GLB,,, | Performed by: INTERNAL MEDICINE

## 2018-02-21 PROCEDURE — 3008F BODY MASS INDEX DOCD: CPT | Mod: S$GLB,,, | Performed by: INTERNAL MEDICINE

## 2018-02-21 PROCEDURE — 99999 PR PBB SHADOW E&M-EST. PATIENT-LVL III: CPT | Mod: PBBFAC,,, | Performed by: INTERNAL MEDICINE

## 2018-02-21 RX ORDER — FLUCONAZOLE 150 MG/1
TABLET ORAL
COMMUNITY
Start: 2018-02-18 | End: 2018-02-21

## 2018-02-21 NOTE — PROGRESS NOTES
I have personally seen and examined the patient. All labs and studies were reviewed. I agree with the fellows findings and plan as above.    Holter monitor to assess palpitations.

## 2018-02-21 NOTE — PROGRESS NOTES
Subjective:       Patient ID: Ivy Salgado is a 37 y.o. female.    Chief Complaint: Hospital Follow Up    Pt here for f/u from hospital where she recently had a couple of admissions. Record reviewed. She was initially dx with sepsis from UTI due to ESBL e coli. She was tx accordingly and not having any symptoms currently. She has f/u with ID scheduled as well as urology due to recurrent nature of UTIs.     She was also seen for tachycardia/palpitations. Nothing concerning was captured on EKG. She had troponin r/o for ischemia due to chest pain at the time but these were normal. CP has resolved. She also had eval for pulm emb but was neg. She has f/u with cardiology and plans to do 48 hour holter followed by 30 day monitor if initial monitor is unrevealing.     We recently increased her xanax at her request due to increase in anxiety. She has contacted psychiatry for appt but is awaiting response. Her celexa was changed to cymbalta due to concerns for long QTc. She is unsure if it is effective but willing to give more time. No SI/HI.       Review of Systems   Constitutional: Negative for fever and unexpected weight change.   Respiratory: Negative for shortness of breath.    Cardiovascular: Positive for palpitations. Negative for chest pain.   Gastrointestinal: Positive for diarrhea and nausea. Negative for abdominal pain and vomiting.   Genitourinary: Negative for dysuria.   Neurological: Negative for headaches.   Psychiatric/Behavioral: The patient is nervous/anxious.        Objective:      Physical Exam   Constitutional: She is oriented to person, place, and time. She appears well-developed and well-nourished.   Neck: Neck supple. No thyromegaly present.   Cardiovascular: Normal rate, regular rhythm and normal heart sounds.    Pulmonary/Chest: Effort normal and breath sounds normal.   Abdominal: Soft. Bowel sounds are normal. She exhibits no distension and no mass. There is no tenderness.   Stoma intact    Musculoskeletal: She exhibits no edema.   Lymphadenopathy:     She has no cervical adenopathy.   Neurological: She is alert and oriented to person, place, and time.   Psychiatric: She has a normal mood and affect. Her behavior is normal.       Assessment:       1. Tachycardia    2. UTI due to extended-spectrum beta lactamase (ESBL) producing Escherichia coli    3. Generalized anxiety disorder        Plan:       1. Keep f/u with specialists including cardiology, GI, ID, urology  2. Continue current meds  3. Records and labs reviewed with pt

## 2018-02-22 ENCOUNTER — TELEPHONE (OUTPATIENT)
Dept: OBSTETRICS AND GYNECOLOGY | Facility: CLINIC | Age: 38
End: 2018-02-22

## 2018-02-22 NOTE — TELEPHONE ENCOUNTER
----- Message from Donavan Aldana sent at 2/22/2018 11:01 AM CST -----  Contact: [phone]/self  Pt requesting to speak with you concerning a yeast infection she's been having for a month.  Please call and advise

## 2018-02-22 NOTE — TELEPHONE ENCOUNTER
"Complaining of a lot of vaginal discharge that is thick/chunky.  Was recently in the hospital and was given a lot of IV antibiotics.   She did OTC yeast medications and has taken a few rounds of Diflucan.  "it has been going on for a least 5 weeks".   Not sure if needs to be seen.  Would like Dr. Morales to give her a call.  "

## 2018-02-23 ENCOUNTER — PATIENT MESSAGE (OUTPATIENT)
Dept: GASTROENTEROLOGY | Facility: CLINIC | Age: 38
End: 2018-02-23

## 2018-02-23 ENCOUNTER — OFFICE VISIT (OUTPATIENT)
Dept: OBSTETRICS AND GYNECOLOGY | Facility: CLINIC | Age: 38
End: 2018-02-23
Payer: COMMERCIAL

## 2018-02-23 VITALS
SYSTOLIC BLOOD PRESSURE: 118 MMHG | HEIGHT: 61 IN | BODY MASS INDEX: 18.1 KG/M2 | WEIGHT: 95.88 LBS | DIASTOLIC BLOOD PRESSURE: 82 MMHG

## 2018-02-23 DIAGNOSIS — N89.8 VAGINAL DISCHARGE: Primary | ICD-10-CM

## 2018-02-23 DIAGNOSIS — N39.0 URINARY TRACT INFECTION WITHOUT HEMATURIA, SITE UNSPECIFIED: ICD-10-CM

## 2018-02-23 PROCEDURE — 3008F BODY MASS INDEX DOCD: CPT | Mod: S$GLB,,, | Performed by: OBSTETRICS & GYNECOLOGY

## 2018-02-23 PROCEDURE — 87491 CHLMYD TRACH DNA AMP PROBE: CPT

## 2018-02-23 PROCEDURE — 87480 CANDIDA DNA DIR PROBE: CPT

## 2018-02-23 PROCEDURE — 87086 URINE CULTURE/COLONY COUNT: CPT

## 2018-02-23 PROCEDURE — 99999 PR PBB SHADOW E&M-EST. PATIENT-LVL III: CPT | Mod: PBBFAC,,, | Performed by: OBSTETRICS & GYNECOLOGY

## 2018-02-23 PROCEDURE — 99213 OFFICE O/P EST LOW 20 MIN: CPT | Mod: S$GLB,,, | Performed by: OBSTETRICS & GYNECOLOGY

## 2018-02-23 RX ORDER — TERCONAZOLE 4 MG/G
1 CREAM VAGINAL NIGHTLY
Qty: 45 G | Refills: 1 | Status: SHIPPED | OUTPATIENT
Start: 2018-02-23 | End: 2018-03-02

## 2018-02-23 NOTE — PROGRESS NOTES
OBSTETRIC HISTORY:   OB History      Para Term  AB Living    2 1 1 0 1 1    SAB TAB Ectopic Multiple Live Births    1 0 0 0 1         COMPREHENSIVE GYN HISTORY:  PAP History: Reports abnormal Paps. Date:  and  Treatment: Colposcopy and CKC Result of Colpo 10/3/12: ECC: Negative ECTO: Koilocytosis (aka LYLA 1)  Pap #2/3: Date:  Result: Normal  Pap #1/3: Date: 13 Result: LSIL  Infection History: Reports STDs: Herpes. Denies PID.  Benign History: Denies uterine fibroids. Reports ovarian cysts. Denies endometriosis.   Cancer History: Denies cervical cancer. Denies uterine cancer or hyperplasia. Denies ovarian cancer. Denies vulvar cancer or pre-cancer. Denies vaginal cancer or pre-cancer. Denies breast cancer. Denies colon cancer.  Sexual Activity History:  reports that she currently engages in sexual activity. She reports using the following methods of birth control/protection: Pill and Surgical.   Menstrual History: Every 28 days, flows for 3 days. Moderate flow.  Dysmenorrhea History: Reports severe dysmenorrhea.  Contraception: Hysterectomy        HPI:   37 y.o.  Patient's last menstrual period was 2015.   Patient is  here complaining of vaginal discharge. She tried OTC and Diflucan without resolution. Recently admitted for sepsis 2/2 UTI. Was on multiple antibiotics. She denies bladder, breast and bowel complaints.    ROS:  GENERAL: Denies weight gain or weight loss. Feeling well overall.   SKIN: Denies rash or lesions.   HEAD: Denies headache.   NODES: Denies enlarged lymph nodes.   CHEST: Denies shortness of breath.   ABDOMEN: No abdominal pain, constipation, diarrhea, nausea, vomiting or rectal bleeding.   URINARY: No frequency, dysuria, hematuria, or burning on urination.  REPRODUCTIVE: See HPI.   BREASTS: The patient denies pain, lumps, or nipple discharge.   HEMATOLOGIC: No easy bruisability.   MUSCULOSKELETAL: Denies joint pain or back pain.   NEUROLOGIC: Denies weakness.  "  PSYCHIATRIC: Denies depression, anxiety or mood swings.    PE:   /82   Ht 5' 1" (1.549 m)   Wt 43.5 kg (95 lb 14.4 oz)   LMP 03/30/2015   BMI 18.12 kg/m²   APPEARANCE: Well nourished, well developed, in no acute distress.  ABDOMEN: Soft. No tenderness or masses. No hernias.  PELVIC:   VULVA: No lesions. Normal female genitalia.  URETHRAL MEATUS: Normal size and location, no lesions, no prolapse.  URETHRA: No masses, tenderness, prolapse or scarring.  VAGINA: Moist and well rugated, with abnormal discharge, no significant cystocele or rectocele.  CERVIX: and UTERUS: Surgically absent.  ADNEXA: No masses or tenderness.    ASSESSMENT:  1. Vaginal Discharge  2. Recent UTI and sepsis    PLAN:  Affirm  GC/CT  Urine cx  Current partner works construction and is circumcised so he might need to treat for 2 weeks with Lotrimin.  "

## 2018-02-24 LAB
C TRACH DNA SPEC QL NAA+PROBE: NOT DETECTED
N GONORRHOEA DNA SPEC QL NAA+PROBE: NOT DETECTED

## 2018-02-25 ENCOUNTER — PATIENT MESSAGE (OUTPATIENT)
Dept: OBSTETRICS AND GYNECOLOGY | Facility: CLINIC | Age: 38
End: 2018-02-25

## 2018-02-25 LAB
BACTERIA UR CULT: NO GROWTH
CANDIDA RRNA VAG QL PROBE: NEGATIVE
G VAGINALIS RRNA GENITAL QL PROBE: POSITIVE
T VAGINALIS RRNA GENITAL QL PROBE: NEGATIVE

## 2018-02-25 RX ORDER — METRONIDAZOLE 500 MG/1
500 TABLET ORAL EVERY 12 HOURS
Qty: 14 TABLET | Refills: 0 | Status: SHIPPED | OUTPATIENT
Start: 2018-02-25 | End: 2018-03-04

## 2018-02-26 ENCOUNTER — PATIENT MESSAGE (OUTPATIENT)
Dept: OBSTETRICS AND GYNECOLOGY | Facility: CLINIC | Age: 38
End: 2018-02-26

## 2018-02-26 NOTE — TELEPHONE ENCOUNTER
Perfect. And the urine was ok?  Could my bf get that from me like the yeast? Dont want to be passing it back and forth, it that was possible. Or just wait until I finish the antibiotics before having sex?  And do you think since I stopped the stelara and once its out my system should these episodes of me getting them get less frequent? And could I still put a little hydrocortisone cream inside since I feel a little swollen?   Thanks again for squeezing in

## 2018-02-27 ENCOUNTER — OFFICE VISIT (OUTPATIENT)
Dept: INFECTIOUS DISEASES | Facility: CLINIC | Age: 38
End: 2018-02-27
Payer: COMMERCIAL

## 2018-02-27 ENCOUNTER — TELEPHONE (OUTPATIENT)
Dept: GASTROENTEROLOGY | Facility: CLINIC | Age: 38
End: 2018-02-27

## 2018-02-27 VITALS
TEMPERATURE: 98 F | DIASTOLIC BLOOD PRESSURE: 93 MMHG | WEIGHT: 97.88 LBS | HEIGHT: 61 IN | SYSTOLIC BLOOD PRESSURE: 149 MMHG | BODY MASS INDEX: 18.48 KG/M2 | HEART RATE: 83 BPM

## 2018-02-27 DIAGNOSIS — B37.31 CANDIDAL VULVOVAGINITIS: ICD-10-CM

## 2018-02-27 DIAGNOSIS — B96.29 UTI DUE TO EXTENDED-SPECTRUM BETA LACTAMASE (ESBL) PRODUCING ESCHERICHIA COLI: Primary | ICD-10-CM

## 2018-02-27 DIAGNOSIS — N76.0 BACTERIAL VAGINOSIS: ICD-10-CM

## 2018-02-27 DIAGNOSIS — Z79.52 CURRENT CHRONIC USE OF SYSTEMIC STEROIDS: ICD-10-CM

## 2018-02-27 DIAGNOSIS — N39.0 UTI DUE TO EXTENDED-SPECTRUM BETA LACTAMASE (ESBL) PRODUCING ESCHERICHIA COLI: Primary | ICD-10-CM

## 2018-02-27 DIAGNOSIS — Z93.2 S/P ILEOSTOMY: ICD-10-CM

## 2018-02-27 DIAGNOSIS — B96.89 BACTERIAL VAGINOSIS: ICD-10-CM

## 2018-02-27 DIAGNOSIS — K50.018 CROHN'S DISEASE OF SMALL INTESTINE WITH OTHER COMPLICATION: Chronic | ICD-10-CM

## 2018-02-27 DIAGNOSIS — Z16.12 UTI DUE TO EXTENDED-SPECTRUM BETA LACTAMASE (ESBL) PRODUCING ESCHERICHIA COLI: Primary | ICD-10-CM

## 2018-02-27 DIAGNOSIS — N39.0 RECURRENT UTI: Chronic | ICD-10-CM

## 2018-02-27 PROCEDURE — 99999 PR PBB SHADOW E&M-EST. PATIENT-LVL III: CPT | Mod: PBBFAC,,, | Performed by: INTERNAL MEDICINE

## 2018-02-27 PROCEDURE — 3080F DIAST BP >= 90 MM HG: CPT | Mod: S$GLB,,, | Performed by: INTERNAL MEDICINE

## 2018-02-27 PROCEDURE — 3077F SYST BP >= 140 MM HG: CPT | Mod: S$GLB,,, | Performed by: INTERNAL MEDICINE

## 2018-02-27 PROCEDURE — 99214 OFFICE O/P EST MOD 30 MIN: CPT | Mod: S$GLB,,, | Performed by: INTERNAL MEDICINE

## 2018-02-27 RX ORDER — FLUCONAZOLE 150 MG/1
150 TABLET ORAL DAILY
Qty: 30 TABLET | Refills: 0 | Status: SHIPPED | OUTPATIENT
Start: 2018-02-27 | End: 2018-03-27

## 2018-02-27 RX ORDER — CLINDAMYCIN PHOSPHATE 20 MG/G
CREAM VAGINAL NIGHTLY
Qty: 40 G | Refills: 2 | Status: SHIPPED | OUTPATIENT
Start: 2018-02-27 | End: 2018-03-08

## 2018-02-27 NOTE — PHYSICIAN QUERY
PT Name: Ivy Salgado  MR #: [id number]     Physician Query Form - Diagnosis Clarification      CDS/: Marek Alvarez               Contact information:   [email or web address]  This form is a permanent document in the medical record.     Query Date: February 27, 2018    By submitting this query, we are merely seeking further clarification of documentation.  Please utilize your independent clinical judgment when addressing the question(s) below.     The medical record contains the following:      Findings Supporting Clinical Information Location in Medical Record   sepsis Upon presentation to the ED patient was tachycardic ~115, not febrile or hypotensive, on exam with generalized abdominal tenderness. Of note in the labs, WBC 16.6k and lactate 3.5, flu negative, u/a, abdominal x-ray and CT scan not indicative of intra-abdominal acute pathology. Received 1L NS bolus + clindamycin 600 mg IV x1 + zosyn 4.5 gr IV x1 + antiemetics. Getting admitted to medicine floor for sepsis.    ...admitted to medicine floor for sepsis. Lactate down to 1.1 after IVF, procal normal, mostly likely viral gastroenteritis... H&P 2/16            DC 2/18     Please clarify if the      sepsis       diagnosis has been:    [  ] Ruled In  [x  ] Ruled In, Now Resolved  [  ] Ruled Out  [  ] Clinically undetermined  [  ] Other/Clarification of findings (please specify)_______________________________    Please document in your progress notes daily for the duration of treatment, until resolved, and include in your discharge summary.

## 2018-02-27 NOTE — PROGRESS NOTES
Subjective:      Patient ID: Ivy Salgado is a 37 y.o. female.    Chief Complaint: Follow-up ESBL E. Coli UTI    History of Present Illness  37-year-old with Crohn's disease s/p TAC + ileostomy in 2012, recurrent UTI, presents for follow-up of ESBL E. Coli UTI. Patient reports starting ustekinumab about a year ago for her Crohn's. Since initiating therapy, patient reports having recurrent UTIs, Candida vulvovaginitis, and Bv. Given her recurrent infections, she ustekinumab was discontinues and she was started on prednisone 10 mg qdaily.     Patient recently [place] [facility] [place] - thought to be in setting of urosepsis - treated empirically with cefriaxone Iv. After discharge, urine cultures with ESBL E. Coli - she was prescribed a course of TMP-SMX but she was unable to tolerate the antibiotic. She presented with fatigue and right flank pain - she was given a dose of gentamicin and two doses of ertapanem - subsequently her symptoms resolved and repeat urine cultures with no growth. CT abd/pelvis with non-obstructing renal stones. 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     Currently, patient is being treated for BV with metronidazole - patient reports it is difficult for her to tolerate metronidazole - takes one a day. Generally, patient with difficulty tolerating PO antibiotics given history of Crohn's disease/    Review of Systems   Constitution: Positive for chills, weakness, malaise/fatigue and night sweats. Negative for decreased appetite, fever, weight gain and weight loss.   HENT: Negative for congestion, ear pain, hearing loss, hoarse voice, sore throat and tinnitus.    Eyes: Negative for blurred vision, redness and visual disturbance.   Cardiovascular: Positive for palpitations. Negative for chest pain and leg swelling.   Respiratory: Negative for cough, hemoptysis, shortness of breath and sputum production.     Hematologic/Lymphatic: Negative for adenopathy. Bruises/bleeds easily.   Skin: Negative for dry skin, itching, rash and suspicious lesions.   Musculoskeletal: Positive for joint pain. Negative for back pain, myalgias and neck pain.   Gastrointestinal: Positive for diarrhea. Negative for abdominal pain, constipation, heartburn, nausea and vomiting.   Genitourinary: Positive for flank pain. Negative for dysuria, frequency, hematuria, hesitancy and urgency.   Neurological: Negative for dizziness, headaches, numbness and paresthesias.   Psychiatric/Behavioral: Positive for depression. Negative for memory loss. The patient has insomnia and is nervous/anxious.      Objective:   Physical Exam   Constitutional: She is oriented to person, place, and time. She appears well-developed and well-nourished. No distress.   HENT:   Head: Normocephalic and atraumatic.   Eyes: Conjunctivae and EOM are normal.   Neck: Normal range of motion. Neck supple.   Cardiovascular: Normal rate.    Pulmonary/Chest: Effort normal. No respiratory distress.   Abdominal: Soft. She exhibits no distension.   Musculoskeletal: Normal range of motion. She exhibits no edema.   Neurological: She is alert and oriented to person, place, and time.   Skin: Skin is warm and dry. No rash noted. She is not diaphoretic. No erythema.   Bruising on arms and legs   Psychiatric: She has a normal mood and affect. Her behavior is normal.   Vitals reviewed.    Assessment:   37-year-old female  - ESBL E. Coli UTI, resolved  - Recurrent UTI  - Recurrent Candida vulvovaginitis  - Recurrent Bacterial Vaginosis  - Crohn's disease s/p TAC/ileostomy, on prednisone 10 mg  - Bilateral non-obstructing renal stones    Plan:   - No additional antibiotics indicated at this time  - Will obtain urinalysis / urine culture with any onset of UTI symptoms - will then give trial of fosfomycin  - Follow-up with urology - non-obstructing renal stones could be reservoir for ongoing  infection  - Trial of clindamycin intravaginal capsules for BV  - Fluconazole 150 mg po x1 for episodes of Candidal vulvovaginitis    RTC prn    Dinora Moser MD MPH  Infectious Diseases [facility]

## 2018-02-27 NOTE — TELEPHONE ENCOUNTER
----- Message from Quiana Carmen sent at 2/27/2018  1:28 PM CST -----  Contact: self - [phone]  Cody - returning your call - please call patient at [phone]

## 2018-02-28 ENCOUNTER — PATIENT MESSAGE (OUTPATIENT)
Dept: INTERNAL MEDICINE | Facility: CLINIC | Age: 38
End: 2018-02-28

## 2018-02-28 PROBLEM — A41.9 SEPSIS: Status: RESOLVED | Noted: 2018-02-16 | Resolved: 2018-02-28

## 2018-03-01 RX ORDER — LISINOPRIL 10 MG/1
TABLET ORAL
Qty: 90 TABLET | Refills: 2 | Status: SHIPPED | OUTPATIENT
Start: 2018-03-01 | End: 2018-05-17

## 2018-03-01 RX ORDER — DIPHENOXYLATE HYDROCHLORIDE AND ATROPINE SULFATE 2.5; .025 MG/1; MG/1
TABLET ORAL
Qty: 90 TABLET | Refills: 0 | Status: SHIPPED | OUTPATIENT
Start: 2018-03-01 | End: 2018-05-01 | Stop reason: SDUPTHER

## 2018-03-01 NOTE — PHYSICIAN QUERY
PT Name: Ivy Salgado  MR #: [id number]    Physician Query Form - Nutrition Clarification     CDS/: Marek Alvarez               Contact information:   [email or web address]     This form is a permanent document in the medical record.     Query Date: March 1, 2018    By submitting this query, we are merely seeking further clarification of documentation.. Please utilize your independent clinical judgment when addressing the question(s) below.    The Medical record contains the following:   Indicators  Supporting Clinical Findings Location in Medical Record    % of Estimated Energy Intake over a time frame from p.o., TF, or TPN      Weight Status over a time frame      Subcutaneous Fat and/or Muscle Loss      Fluid Accumulation or Edema      Reduced  Strength     x Wt / BMI / Usual Body Weight Weight: 45.1 kg (99 lb 6.8 oz)  Body mass index is 18.79 kg/m².    H&P  2/16    Delayed Wound Healing / Failure to Thrive     x Acute or Chronic Illness Admitted with nausea/vomiting and mild abdominal pain in the setting of tachycardia, lactic acidosis, and leukocytosis, with concern for sepsis...    Crohn's with total colectomy and ileostomy (2012) H&P  2/16        H&P  2/16    Medication      Treatment     x Other Pale conjunctivae ;  Mild erythema on uvula and oropharynx likely due to emesis    Ileostomy in the R side of the abdomen, with no TTP, erythema or discharge at the site...Skin: Skin is warm. No rash noted. She is not diaphoretic. No erythema. There is pallor.  H&P  2/16      DC summ 2/16       AND / ASPEN Clinical Characteristics (October 2011)  A minimum of two characteristics is recommended for diagnosing either moderate or severe malnutrition   Mild Malnutrition Moderate Malnutrition Severe Malnutrition   Energy Intake from p.o., TF or TPN. < 75% intake of estimated energy needs for less than 7 days < 75% intake of estimated energy needs for greater than 7 days < 50% intake of estimated energy  needs for > 5 days   Weight Loss 1-2% in 1 month  5% in 3 months  7.5% in 6 months  10% in 1 year 1-2 % in 1 week  5% in 1 month  7.5% in 3 months  10% in 6 months  20% in 1 year > 2% in 1 week  > 5% in 1 month  > 7.5% in 3 months  > 10% in 6 months  > 20% in 1 year   Physical Findings     None *Mild subcutaneous fat and/or muscle loss  *Mild fluid accumulation  *Stage II decubitus  *Surgical wound or non-healing wound *Mod/severe subcutaneous fat and/or muscle loss  *Mod/severe fluid accumulation  *Stage III or IV decubitus  *Non-healing surgical wound     Provider, please specify diagnosis or diagnoses associated with above clinical findings.    [x ] Mild Protein-Calorie Malnutrition  [ ] Moderate Protein-Calorie Malnutrition  [ ] Severe Protein-Calorie Malnutrition  [ ] Other Nutritional Diagnosis (please specify): ____________________________________  [ ] Other: ________________________________  [ ] Clinically Undetermined    Please document in your progress notes daily for the duration of treatment until resolved and include in your discharge summary.

## 2018-03-05 ENCOUNTER — PATIENT MESSAGE (OUTPATIENT)
Dept: OBSTETRICS AND GYNECOLOGY | Facility: CLINIC | Age: 38
End: 2018-03-05

## 2018-03-05 RX ORDER — VALACYCLOVIR HYDROCHLORIDE 500 MG/1
500 TABLET, FILM COATED ORAL DAILY
Qty: 90 TABLET | Refills: 3 | Status: ON HOLD | OUTPATIENT
Start: 2018-03-05 | End: 2018-09-20 | Stop reason: HOSPADM

## 2018-03-06 ENCOUNTER — PATIENT MESSAGE (OUTPATIENT)
Dept: GASTROENTEROLOGY | Facility: CLINIC | Age: 38
End: 2018-03-06

## 2018-03-06 ENCOUNTER — TELEPHONE (OUTPATIENT)
Dept: UROLOGY | Facility: CLINIC | Age: 38
End: 2018-03-06

## 2018-03-06 DIAGNOSIS — K50.819 CROHN'S DISEASE OF SMALL AND LARGE INTESTINES WITH COMPLICATION: ICD-10-CM

## 2018-03-06 RX ORDER — OXYCODONE AND ACETAMINOPHEN 10; 325 MG/1; MG/1
1 TABLET ORAL EVERY 6 HOURS PRN
Qty: 40 TABLET | Refills: 0 | Status: SHIPPED | OUTPATIENT
Start: 2018-03-06 | End: 2018-04-03 | Stop reason: SDUPTHER

## 2018-03-06 NOTE — TELEPHONE ENCOUNTER
Pt is schedule to follow up with you on 3/8 was she was to receive another kit. Please advise. Last test was 11/2017

## 2018-03-07 RX ORDER — ZOLPIDEM TARTRATE 10 MG/1
10 TABLET ORAL NIGHTLY
Qty: 30 TABLET | Refills: 0 | Status: SHIPPED | OUTPATIENT
Start: 2018-03-07 | End: 2018-04-06 | Stop reason: SDUPTHER

## 2018-03-08 ENCOUNTER — OFFICE VISIT (OUTPATIENT)
Dept: UROLOGY | Facility: CLINIC | Age: 38
End: 2018-03-08
Attending: UROLOGY
Payer: COMMERCIAL

## 2018-03-08 VITALS
HEIGHT: 61 IN | BODY MASS INDEX: 18.48 KG/M2 | HEART RATE: 66 BPM | DIASTOLIC BLOOD PRESSURE: 80 MMHG | WEIGHT: 97.88 LBS | SYSTOLIC BLOOD PRESSURE: 115 MMHG

## 2018-03-08 DIAGNOSIS — N20.0 NEPHROLITHIASIS: Primary | ICD-10-CM

## 2018-03-08 PROCEDURE — 3079F DIAST BP 80-89 MM HG: CPT | Mod: S$GLB,,, | Performed by: UROLOGY

## 2018-03-08 PROCEDURE — 3074F SYST BP LT 130 MM HG: CPT | Mod: S$GLB,,, | Performed by: UROLOGY

## 2018-03-08 PROCEDURE — 99214 OFFICE O/P EST MOD 30 MIN: CPT | Mod: S$GLB,,, | Performed by: UROLOGY

## 2018-03-08 RX ORDER — POTASSIUM CITRATE 15 MEQ/1
2 TABLET, EXTENDED RELEASE ORAL 2 TIMES DAILY
Qty: 360 TABLET | Refills: 3
Start: 2018-03-08 | End: 2019-03-28 | Stop reason: SDUPTHER

## 2018-03-08 NOTE — PROGRESS NOTES
"Subjective:      Ivy Salgado is a 37 y.o. female who returns today regarding her UTIs.    Full history detailed elsewhere.    Admitted recently at [facility] for ESBL E coli sepsis, presumably from urine. She had no UTI symptoms at that time however.     Doing well today without c/o.    Still not performing CIC and continues to have low UOP.    The following portions of the patient's history were reviewed and updated as appropriate: allergies, current medications, past family history, past medical history, past social history, past surgical history and problem list.    Review of Systems  A comprehensive multipoint review of systems was negative except as otherwise stated in the HPI.     Objective:   Vitals: /80 (BP Location: Left arm, Patient Position: Sitting, BP Method: Large (Automatic))   Pulse 66   Ht 5' 1" (1.549 m)   Wt 44.4 kg (97 lb 14.2 oz)   LMP 03/30/2015   BMI 18.50 kg/m²     Physical Exam   General: alert and oriented, no acute distress  Respiratory: Symmetric expansion, non-labored breathing  Neuro: no gross deficits  Psych: normal judgment and insight, normal mood/affect and non-anxious    Bladder Scan PVR: 93cc      Lab Review     Lab Results   Component Value Date    WBC 11.64 02/17/2018    HGB 10.8 (L) 02/17/2018    HCT 32.7 (L) 02/17/2018    MCV 90 02/17/2018     02/17/2018     Lab Results   Component Value Date    CREATININE 0.6 02/17/2018    BUN 5 (L) 02/17/2018     Date: 11/2/17 -- Vol 0.50 L, Ca 95 mg/day, Ox 11 mg/day, Cit 132 mg/day, pH 5.319, Na 21, K 20, Mg 18, Cr 796     Imaging   Reviewed images from recent CT (w/ contrast) on 2/17/18. As with prior scan, there is a small stone in LP of right kidney, but none noted on the left. No hydro or ureteral stones.    Assessment and Plan:   1. History of recurrent UTIs  -- Doubtful small renal stone contributing; discussed surgical options to treat but she opts to defer for now  -- Monitor for now    2. Nephrolithiasis  -- " Minimal stone burden on recent CT  -- Unlikely contributing to UTIs so will avoid treatment for now  -- Continue UroCit K 30 mEq BID  -- Repeat litholink and message w/ results    3. Low urine volume   -- Very low and very likely contributing to stone formation  -- Difficult to treat due to high output from ileostomy  -- Instructed in increase fluid intake, as much as possible, for goal of 2.5 - 3.0 L UOP per day (light yellow)     4. Hypocitraturia  -- Significance uncertain since stone composition unknown (small renal stones not seen on KUB)  -- Continue UroCit K 30mEq BID    5. Low urine pH  -- Significance uncertain since stone composition unknown (small renal stones not seen on KUB)  -- Continue UroCit K 30mEq BID    FU 6 mos w/ KUB

## 2018-03-09 ENCOUNTER — PATIENT MESSAGE (OUTPATIENT)
Dept: CARDIOLOGY | Facility: CLINIC | Age: 38
End: 2018-03-09

## 2018-03-19 ENCOUNTER — PATIENT MESSAGE (OUTPATIENT)
Dept: INTERNAL MEDICINE | Facility: CLINIC | Age: 38
End: 2018-03-19

## 2018-03-19 ENCOUNTER — PATIENT MESSAGE (OUTPATIENT)
Dept: CARDIOLOGY | Facility: CLINIC | Age: 38
End: 2018-03-19

## 2018-03-19 RX ORDER — ALPRAZOLAM 1 MG/1
1 TABLET ORAL 2 TIMES DAILY PRN
Qty: 60 TABLET | Refills: 0 | Status: SHIPPED | OUTPATIENT
Start: 2018-03-19 | End: 2018-04-16 | Stop reason: SDUPTHER

## 2018-03-21 ENCOUNTER — TELEPHONE (OUTPATIENT)
Dept: CARDIOLOGY | Facility: CLINIC | Age: 38
End: 2018-03-21

## 2018-03-21 ENCOUNTER — PATIENT MESSAGE (OUTPATIENT)
Dept: CARDIOLOGY | Facility: CLINIC | Age: 38
End: 2018-03-21

## 2018-03-27 ENCOUNTER — OFFICE VISIT (OUTPATIENT)
Dept: ENDOCRINOLOGY | Facility: CLINIC | Age: 38
End: 2018-03-27
Payer: MEDICAID

## 2018-03-27 VITALS
HEART RATE: 75 BPM | SYSTOLIC BLOOD PRESSURE: 100 MMHG | RESPIRATION RATE: 16 BRPM | HEIGHT: 61 IN | BODY MASS INDEX: 19.19 KG/M2 | DIASTOLIC BLOOD PRESSURE: 80 MMHG | WEIGHT: 101.63 LBS

## 2018-03-27 DIAGNOSIS — E55.9 VITAMIN D DEFICIENCY: ICD-10-CM

## 2018-03-27 DIAGNOSIS — M85.9 LOW BONE DENSITY: Primary | ICD-10-CM

## 2018-03-27 DIAGNOSIS — Z90.710 H/O ABDOMINAL HYSTERECTOMY: ICD-10-CM

## 2018-03-27 DIAGNOSIS — K50.018 CROHN'S DISEASE OF SMALL INTESTINE WITH OTHER COMPLICATION: Chronic | ICD-10-CM

## 2018-03-27 DIAGNOSIS — Z79.52 CURRENT CHRONIC USE OF SYSTEMIC STEROIDS: ICD-10-CM

## 2018-03-27 DIAGNOSIS — E55.9 VITAMIN D DEFICIENCY: Primary | ICD-10-CM

## 2018-03-27 PROCEDURE — 99204 OFFICE O/P NEW MOD 45 MIN: CPT | Mod: S$PBB,,, | Performed by: INTERNAL MEDICINE

## 2018-03-27 PROCEDURE — 99999 PR PBB SHADOW E&M-EST. PATIENT-LVL III: CPT | Mod: PBBFAC,,, | Performed by: INTERNAL MEDICINE

## 2018-03-27 PROCEDURE — 99213 OFFICE O/P EST LOW 20 MIN: CPT | Mod: PBBFAC | Performed by: INTERNAL MEDICINE

## 2018-03-27 RX ORDER — ERGOCALCIFEROL 1.25 MG/1
50000 CAPSULE ORAL
Qty: 12 CAPSULE | Refills: 3 | Status: SHIPPED | OUTPATIENT
Start: 2018-03-27 | End: 2018-05-14 | Stop reason: SDUPTHER

## 2018-03-27 NOTE — PATIENT INSTRUCTIONS
Start over the counter vitamin D 3 2000 IUs daily   Start prescription 50K once weekly for three months  Consider estrogen as treatment for bones.     F/u in six months with me.   Please call four months before your appointment.

## 2018-03-27 NOTE — LETTER
March 27, 2018      Kalia Astorga MD  [address]  [facility] [address]           [facility] - Endo/Diab/Metab  [address]  [facility] [address]  Phone: [phone]  Fax: [phone]          Patient: Ivy Salgado   MR Number: [id number]   YOB: 1980   Date of Visit: 3/27/2018       Dear Dr. Kalia Astorga:    Thank you for referring Ivy Salgado to me for evaluation. Attached you will find relevant portions of my assessment and plan of care.    If you have questions, please do not hesitate to call me. I look forward to following Ivy Salgado along with you.    Sincerely,    Jodie Lazaro MD    Enclosure  CC:  No Recipients    If you would like to receive this communication electronically, please contact [email or web address] or [phone] to request more information on [facility] access.    For providers and/or their staff who would like to refer a patient to [facility], please contact us through our one-stop-shop provider referral line, [facility], at [phone].    If you feel you have received this communication in error or would no longer like to receive these types of communications, please e-mail [email or web address]

## 2018-03-27 NOTE — PROGRESS NOTES
"Subjective:     Patient ID: Ivy Salgado is a 37 y.o. female.    Chief Complaint: Consult    HPI:   Ms. Salgado is a 37 y.o. female who is here for a consult visit for  low bone mass with atrumatic non-vertebral fractures (two foot and collarbone fractures) while on prednisone 10 mg for crohn's disease. Diagnosed with bone density from 1/2018.     Fracture history:   Sustained foot fracture four months ago. This occurred while getting up from a chair and accidentally hit coffee table with her foot. X ray done at urgent care demonstrated fracture. Previous foot fracture occurred 2016. Prior to this fracture collar bone in 2010 while "horsing around," fell on to a couch and sustained fracture.     Has used prednisone 10 mg for the past year, but has chronic intermttent prednisone use since Crohn's diagnosis 27 years ago.     Other medications:  Stelara side effects, used for nine months  Simzia for ten years   Humera - lupus like reaction  remicade - six years  Imuran - pancretitis  Methotrexate - agranulocytosis    Osteoporosis Risk Factor Assessment:    Menarche occurred at 14 years.   Hysterectomy with right oophorectomy (2015). Recent of pelvis right ovarian cyst.   Her menstrual cycles were normal throughout her reproductive life. Symptoms of hot flushing and night sweats, has had hormones checked in the past which were not indicative of ovarian failure. Denies vaginal dryness or painful intercourse.     Weight fluctuating between 97 - 100 lbs, currently on 101 lbs. Reports normal weight is 105 - 110 lbs.     Denies fractures to her wrist, hip or spine. Denies loss of height.     Maternal grandmother with osteoporosis. Denies family history of stooped posture, or fractures of hip.      Has used OCPs for 16 years (fractured collar bone and foot). Did have a two years break and was able to have one child, she is [age] years old.   Denies use anticoagulants, proton pump inhibitors, diabetes medications or antiseizure " "medications.     She reports history of kidney stones and UTI with stelera use. Denies anemia or kidney disease.     Supplements:  Women's MVI  Vitamin D3 - 500 IU daily     Dietary:  Cheese and ice cream.     Review of Systems   Constitutional: Negative for chills and fever.   HENT: Positive for congestion. Negative for sinus pressure.    Eyes: Negative for visual disturbance.   Respiratory: Positive for cough. Negative for chest tightness and shortness of breath.    Cardiovascular: Negative for chest pain, palpitations and leg swelling.   Gastrointestinal: Negative for abdominal pain and vomiting.   Genitourinary: Negative for dysuria.   Musculoskeletal: Negative for arthralgias.   Skin: Negative for rash.   Neurological: Negative for weakness.   Hematological: Does not bruise/bleed easily.   Psychiatric/Behavioral: Negative for sleep disturbance.        Objective:     Physical Exam   Constitutional: She is oriented to person, place, and time. She appears well-developed and well-nourished. No distress.   HENT:   Head: Normocephalic and atraumatic.   Nose: Nose normal.   Mouth/Throat: Oropharynx is clear and moist. No oropharyngeal exudate.   Eyes: Conjunctivae and EOM are normal. Pupils are equal, round, and reactive to light. No scleral icterus.   Neck: Normal range of motion. Neck supple. No thyromegaly present.   Cardiovascular: Normal rate, regular rhythm, normal heart sounds and intact distal pulses.    Pulmonary/Chest: Effort normal and breath sounds normal.   Musculoskeletal: Normal range of motion. She exhibits no edema or tenderness.   Lymphadenopathy:     She has no cervical adenopathy.   Neurological: She is alert and oriented to person, place, and time. She has normal reflexes.   Skin: Skin is warm and dry.   Psychiatric: She has a normal mood and affect.       Vitals:    03/27/18 0949   BP: 100/80   Pulse: 75   Resp: 16   Weight: 46.1 kg (101 lb 10.1 oz)   Height: 5' 1" (1.549 m)     BMD 1/2018  The " bone mineral density measured from L1 through L4 is 0.907g/cm2.  This corresponds to a T score of -2.3 and a Z score of -1.6.    The bone mineral density within the left femoral neck measures 0.751 g/cm2.  This corresponds to a T score of -2.1 and a Z score of -1.3.    The bone mineral density within the right femoral neck measures 0.788 g/cm2.  This corresponds to a T score of -1.8 and a Z score of -1.0.    Nonspecific 3.4 cm cystic lesion correlating with finding on CT abdomen pelvis 11/20/2017 is suspected to correlate with an ovarian cyst.    Results for PHANI RODRIGUEZ (MRN [id number]) as of 3/27/2018 10:04   Ref. Range 2/4/2018 05:54   Vit D, 25-Hydroxy Latest Ref Range: 30 - 96 ng/mL 16 (L)     Assessment/Plan:     Ms. Rodriguez is a 37 year old woman with crohn's disease on prednisone with multiple low trauma fractures.     Risk factors include:  Unknown estrogen status  Vitamin D deficiency  Low trauma non vertebral fractures  Low body weight  Malabsorption with crohn's disease    For bones, would recommend lowest dose of steroids. Needs additional vitamin D, to start prescription dose and increase daily dose to 2000 IUs daily.    If needed would consider estrogen treatment for bones, if not  Menopausal would consider low dose fosamax/raloxifene.    1. Low bone density    - Vitamin D; Future  - PTH, intact; Future  - Renal function panel; Future  - Follicle stimulating hormone; Future  - Luteinizing hormone; Future  - Estradiol; Future    2. Crohn's disease of small intestine with other complication  - vitamin D deficiency, needs high dose replacement    3. Current chronic use of systemic steroids  -  Titrate slowly to lowest possible dose.     4. Vitamin D deficiency    - Vitamin D; Future  - PTH, intact; Future  - Renal function panel; Future  - Follicle stimulating hormone; Future  - Luteinizing hormone; Future  - Estradiol; Future    5. H/O abdominal hysterectomy    - Follicle stimulating hormone; Future  -  Luteinizing hormone; Future  - Estradiol; Future

## 2018-03-28 ENCOUNTER — PATIENT MESSAGE (OUTPATIENT)
Dept: ENDOCRINOLOGY | Facility: CLINIC | Age: 38
End: 2018-03-28

## 2018-03-28 ENCOUNTER — TELEPHONE (OUTPATIENT)
Dept: ENDOCRINOLOGY | Facility: CLINIC | Age: 38
End: 2018-03-28

## 2018-03-28 NOTE — TELEPHONE ENCOUNTER
Called and spoke with patient regarding labs due in one month.  Patient verbalizes understanding- wanted labs scheduled any other day but on a Monday due to class on Monday's.

## 2018-03-28 NOTE — TELEPHONE ENCOUNTER
Repeat labs in one month.   Increase over the counter vitamin D3 to 4000 IUs daily.   Repeat levels in one month.   To use alendronate 35 mg weekly until prednisone dose is at its lowest to prevent additional bone loss.   Repeat labs in one month.

## 2018-03-29 ENCOUNTER — PATIENT MESSAGE (OUTPATIENT)
Dept: OBSTETRICS AND GYNECOLOGY | Facility: HOSPITAL | Age: 38
End: 2018-03-29

## 2018-03-29 ENCOUNTER — TELEPHONE (OUTPATIENT)
Dept: OBSTETRICS AND GYNECOLOGY | Facility: CLINIC | Age: 38
End: 2018-03-29

## 2018-03-29 ENCOUNTER — PATIENT MESSAGE (OUTPATIENT)
Dept: OBSTETRICS AND GYNECOLOGY | Facility: CLINIC | Age: 38
End: 2018-03-29

## 2018-03-29 ENCOUNTER — HOSPITAL ENCOUNTER (OUTPATIENT)
Dept: RADIOLOGY | Facility: OTHER | Age: 38
Discharge: HOME | End: 2018-03-29
Attending: OBSTETRICS & GYNECOLOGY
Payer: MEDICAID

## 2018-03-29 DIAGNOSIS — N83.209 CYST OF OVARY, UNSPECIFIED LATERALITY: ICD-10-CM

## 2018-03-29 PROCEDURE — 76856 US EXAM PELVIC COMPLETE: CPT | Mod: 26,,, | Performed by: RADIOLOGY

## 2018-03-29 PROCEDURE — 76830 TRANSVAGINAL US NON-OB: CPT | Mod: 26,,, | Performed by: RADIOLOGY

## 2018-03-29 PROCEDURE — 76830 TRANSVAGINAL US NON-OB: CPT | Mod: TC

## 2018-03-29 NOTE — TELEPHONE ENCOUNTER
----- Message from Pretty Mena sent at 3/29/2018  4:10 PM CDT -----  Contact: [phone]/self  Patient called in returning your call. Please advise.

## 2018-03-29 NOTE — TELEPHONE ENCOUNTER
----- Message from Gianna Crabtree sent at 3/29/2018  2:12 PM CDT -----  Contact: Self [phone]  Patient is calling to talk to nurse concerning her test results on her ultrasound. Please advice

## 2018-03-29 NOTE — TELEPHONE ENCOUNTER
Patient had her ultrasound done today and she would like for you to look at the images.  Please advise.

## 2018-03-30 DIAGNOSIS — K50.819 CROHN'S DISEASE OF BOTH SMALL AND LARGE INTESTINE WITH COMPLICATION: ICD-10-CM

## 2018-03-30 DIAGNOSIS — Z79.899 ENCOUNTER FOR LONG-TERM (CURRENT) USE OF HIGH-RISK MEDICATION: ICD-10-CM

## 2018-04-02 ENCOUNTER — PATIENT MESSAGE (OUTPATIENT)
Dept: GASTROENTEROLOGY | Facility: CLINIC | Age: 38
End: 2018-04-02

## 2018-04-02 DIAGNOSIS — K50.819 CROHN'S DISEASE OF SMALL AND LARGE INTESTINES WITH COMPLICATION: ICD-10-CM

## 2018-04-02 DIAGNOSIS — K50.819 CROHN'S DISEASE OF BOTH SMALL AND LARGE INTESTINE WITH COMPLICATION: ICD-10-CM

## 2018-04-02 DIAGNOSIS — Z79.899 ENCOUNTER FOR LONG-TERM (CURRENT) USE OF HIGH-RISK MEDICATION: ICD-10-CM

## 2018-04-02 RX ORDER — DRONABINOL 2.5 MG/1
CAPSULE ORAL
Qty: 60 CAPSULE | Refills: 2 | Status: SHIPPED | OUTPATIENT
Start: 2018-04-02 | End: 2018-04-03 | Stop reason: SDUPTHER

## 2018-04-02 RX ORDER — SUMATRIPTAN SUCCINATE 100 MG/1
TABLET ORAL
Qty: 9 TABLET | Refills: 1 | Status: SHIPPED | OUTPATIENT
Start: 2018-04-02 | End: 2018-06-19 | Stop reason: SDUPTHER

## 2018-04-02 RX ORDER — DRONABINOL 2.5 MG/1
CAPSULE ORAL
Qty: 60 CAPSULE | Refills: 2 | Status: SHIPPED | OUTPATIENT
Start: 2018-04-02 | End: 2018-04-02 | Stop reason: SDUPTHER

## 2018-04-02 NOTE — TELEPHONE ENCOUNTER
----- Message from Yessi Fuller sent at 4/2/2018  2:08 PM CDT -----  Contact: Self  Pt is returning a call to Staff.    She can be reached at [phone].    Thank you.

## 2018-04-03 DIAGNOSIS — Z79.899 ENCOUNTER FOR LONG-TERM (CURRENT) USE OF HIGH-RISK MEDICATION: ICD-10-CM

## 2018-04-03 DIAGNOSIS — K50.819 CROHN'S DISEASE OF BOTH SMALL AND LARGE INTESTINE WITH COMPLICATION: ICD-10-CM

## 2018-04-03 RX ORDER — OXYCODONE AND ACETAMINOPHEN 10; 325 MG/1; MG/1
1 TABLET ORAL EVERY 6 HOURS PRN
Qty: 40 TABLET | Refills: 0 | Status: SHIPPED | OUTPATIENT
Start: 2018-04-03 | End: 2018-05-01 | Stop reason: SDUPTHER

## 2018-04-04 RX ORDER — DRONABINOL 2.5 MG/1
CAPSULE ORAL
Qty: 60 CAPSULE | Refills: 2 | Status: SHIPPED | OUTPATIENT
Start: 2018-04-04 | End: 2018-05-05 | Stop reason: SDUPTHER

## 2018-04-06 RX ORDER — ZOLPIDEM TARTRATE 10 MG/1
10 TABLET ORAL NIGHTLY
Qty: 30 TABLET | Refills: 0 | Status: SHIPPED | OUTPATIENT
Start: 2018-04-06 | End: 2018-05-07 | Stop reason: SDUPTHER

## 2018-04-10 ENCOUNTER — CLINICAL SUPPORT (OUTPATIENT)
Dept: ELECTROPHYSIOLOGY | Facility: CLINIC | Age: 38
End: 2018-04-10
Attending: INTERNAL MEDICINE
Payer: MEDICAID

## 2018-04-10 DIAGNOSIS — R00.2 PALPITATION: ICD-10-CM

## 2018-04-10 PROCEDURE — 93226 XTRNL ECG REC<48 HR SCAN A/R: CPT | Mod: PBBFAC | Performed by: INTERNAL MEDICINE

## 2018-04-10 PROCEDURE — 93227 XTRNL ECG REC<48 HR R&I: CPT | Mod: S$PBB,,, | Performed by: INTERNAL MEDICINE

## 2018-04-11 ENCOUNTER — TELEPHONE (OUTPATIENT)
Dept: ELECTROPHYSIOLOGY | Facility: CLINIC | Age: 38
End: 2018-04-11

## 2018-04-11 ENCOUNTER — PATIENT MESSAGE (OUTPATIENT)
Dept: UROLOGY | Facility: CLINIC | Age: 38
End: 2018-04-11

## 2018-04-11 ENCOUNTER — PATIENT MESSAGE (OUTPATIENT)
Dept: CARDIOLOGY | Facility: CLINIC | Age: 38
End: 2018-04-11

## 2018-04-11 ENCOUNTER — PATIENT MESSAGE (OUTPATIENT)
Dept: INTERNAL MEDICINE | Facility: CLINIC | Age: 38
End: 2018-04-11

## 2018-04-11 NOTE — TELEPHONE ENCOUNTER
Spoke with Iram with cardiology and informed her that the monitor will need to be return due to it being a 48 hour Holter and it's programmed for 48 hours only. Magi acknowledged and will contact the patient to have her to return the monitor on tomorrow.

## 2018-04-12 ENCOUNTER — PATIENT MESSAGE (OUTPATIENT)
Dept: INTERNAL MEDICINE | Facility: CLINIC | Age: 38
End: 2018-04-12

## 2018-04-12 ENCOUNTER — PATIENT MESSAGE (OUTPATIENT)
Dept: INFECTIOUS DISEASES | Facility: CLINIC | Age: 38
End: 2018-04-12

## 2018-04-12 ENCOUNTER — TELEPHONE (OUTPATIENT)
Dept: ELECTROPHYSIOLOGY | Facility: CLINIC | Age: 38
End: 2018-04-12

## 2018-04-12 ENCOUNTER — LAB VISIT (OUTPATIENT)
Dept: LAB | Facility: HOSPITAL | Age: 38
End: 2018-04-12
Attending: INTERNAL MEDICINE
Payer: MEDICAID

## 2018-04-12 ENCOUNTER — TELEPHONE (OUTPATIENT)
Dept: INFECTIOUS DISEASES | Facility: CLINIC | Age: 38
End: 2018-04-12

## 2018-04-12 DIAGNOSIS — N30.00 ACUTE CYSTITIS WITHOUT HEMATURIA: Primary | ICD-10-CM

## 2018-04-12 DIAGNOSIS — N30.00 ACUTE CYSTITIS WITHOUT HEMATURIA: ICD-10-CM

## 2018-04-12 LAB
BACTERIA #/AREA URNS AUTO: ABNORMAL /HPF
BILIRUB UR QL STRIP: NEGATIVE
CLARITY UR REFRACT.AUTO: ABNORMAL
COLOR UR AUTO: YELLOW
GLUCOSE UR QL STRIP: NEGATIVE
HGB UR QL STRIP: ABNORMAL
KETONES UR QL STRIP: NEGATIVE
LEUKOCYTE ESTERASE UR QL STRIP: ABNORMAL
MICROSCOPIC COMMENT: ABNORMAL
NITRITE UR QL STRIP: NEGATIVE
PH UR STRIP: 6 [PH] (ref 5–8)
PROT UR QL STRIP: NEGATIVE
RBC #/AREA URNS AUTO: 12 /HPF (ref 0–4)
SP GR UR STRIP: 1.01 (ref 1–1.03)
SQUAMOUS #/AREA URNS AUTO: 5 /HPF
URN SPEC COLLECT METH UR: ABNORMAL
UROBILINOGEN UR STRIP-ACNC: NEGATIVE EU/DL
WBC #/AREA URNS AUTO: >100 /HPF (ref 0–5)
WBC CLUMPS UR QL AUTO: ABNORMAL

## 2018-04-12 PROCEDURE — 87086 URINE CULTURE/COLONY COUNT: CPT

## 2018-04-12 PROCEDURE — 87186 SC STD MICRODIL/AGAR DIL: CPT

## 2018-04-12 PROCEDURE — 87088 URINE BACTERIA CULTURE: CPT

## 2018-04-12 PROCEDURE — 81001 URINALYSIS AUTO W/SCOPE: CPT

## 2018-04-12 PROCEDURE — 87077 CULTURE AEROBIC IDENTIFY: CPT

## 2018-04-12 NOTE — TELEPHONE ENCOUNTER
Pt states inf disease provider informed her of results of urine. Pt declines abx at this time, stating she will await UC results considering the many sensitivities she has to abx.  States verbal understanding.  Patient has no further questions or concerns.

## 2018-04-12 NOTE — TELEPHONE ENCOUNTER
Please see previous mychart enc:I feel like I may be getting a uti   Can I get an order for a uA and culture?     Ms. Haynes,    Please inform us of your current symptoms? How long have they been present? Have you tried any over the counter medications? Please don't hesitate to call the office if there are any questions or concerns.   Thanks,      HIREN Graham     Pt response: I was at main campus today and dropped off a sample.  Ive been having burning for a few days and yesterday started with some low grade fever off and on.

## 2018-04-12 NOTE — TELEPHONE ENCOUNTER
I spoke w/ Ivy. The green light of the holter stayed on, but it showed an error msg on the screen. We are working to get her in hopefully today or tomorrow if inventory permitted. Patient verbalized understanding      Trying to reach Ivy Salgado. Cell/home # did not answer. Spoke to Shaila, her mother, she will contact Ivy to  Call us back

## 2018-04-13 ENCOUNTER — PATIENT MESSAGE (OUTPATIENT)
Dept: INFECTIOUS DISEASES | Facility: CLINIC | Age: 38
End: 2018-04-13

## 2018-04-14 LAB — BACTERIA UR CULT: NORMAL

## 2018-04-16 ENCOUNTER — PATIENT MESSAGE (OUTPATIENT)
Dept: UROLOGY | Facility: CLINIC | Age: 38
End: 2018-04-16

## 2018-04-16 ENCOUNTER — PATIENT MESSAGE (OUTPATIENT)
Dept: GASTROENTEROLOGY | Facility: CLINIC | Age: 38
End: 2018-04-16

## 2018-04-16 ENCOUNTER — TELEPHONE (OUTPATIENT)
Dept: INFECTIOUS DISEASES | Facility: CLINIC | Age: 38
End: 2018-04-16

## 2018-04-16 RX ORDER — ALPRAZOLAM 1 MG/1
1 TABLET ORAL 2 TIMES DAILY PRN
Qty: 60 TABLET | Refills: 0 | Status: SHIPPED | OUTPATIENT
Start: 2018-04-16 | End: 2018-05-14 | Stop reason: SDUPTHER

## 2018-04-16 RX ORDER — PROMETHAZINE HYDROCHLORIDE 25 MG/1
TABLET ORAL
Qty: 30 TABLET | Refills: 3 | Status: SHIPPED | OUTPATIENT
Start: 2018-04-16 | End: 2018-05-15 | Stop reason: SDUPTHER

## 2018-04-16 RX ORDER — NITROFURANTOIN (MACROCRYSTALS) 100 MG/1
100 CAPSULE ORAL EVERY 12 HOURS
Qty: 10 CAPSULE | Refills: 0 | Status: SHIPPED | OUTPATIENT
Start: 2018-04-16 | End: 2018-04-21

## 2018-04-16 RX ORDER — SODIUM CHLORIDE 9 MG/ML
INJECTION, SOLUTION INTRAVENOUS ONCE
Status: CANCELLED
Start: 2018-04-18 | End: 2018-04-18

## 2018-04-16 NOTE — TELEPHONE ENCOUNTER
Patient with UTI symptoms - Enterobacter growing in urine - will prescribe 5 day course of nitrofurantoin 100 mg PO BID

## 2018-04-17 ENCOUNTER — INFUSION (OUTPATIENT)
Dept: INFECTIOUS DISEASES | Facility: HOSPITAL | Age: 38
End: 2018-04-17
Attending: INTERNAL MEDICINE
Payer: MEDICAID

## 2018-04-17 ENCOUNTER — OFFICE VISIT (OUTPATIENT)
Dept: CARDIOLOGY | Facility: CLINIC | Age: 38
End: 2018-04-17
Payer: MEDICAID

## 2018-04-17 VITALS
HEIGHT: 61 IN | RESPIRATION RATE: 18 BRPM | HEART RATE: 86 BPM | WEIGHT: 102.06 LBS | OXYGEN SATURATION: 98 % | TEMPERATURE: 99 F | SYSTOLIC BLOOD PRESSURE: 112 MMHG | DIASTOLIC BLOOD PRESSURE: 64 MMHG | BODY MASS INDEX: 19.27 KG/M2

## 2018-04-17 VITALS
BODY MASS INDEX: 19.4 KG/M2 | WEIGHT: 102.75 LBS | HEIGHT: 61 IN | HEART RATE: 96 BPM | DIASTOLIC BLOOD PRESSURE: 79 MMHG | SYSTOLIC BLOOD PRESSURE: 124 MMHG

## 2018-04-17 DIAGNOSIS — R00.2 PALPITATIONS: Primary | ICD-10-CM

## 2018-04-17 DIAGNOSIS — R53.83 OTHER FATIGUE: ICD-10-CM

## 2018-04-17 DIAGNOSIS — R07.89 OTHER CHEST PAIN: ICD-10-CM

## 2018-04-17 DIAGNOSIS — I47.19 ATRIAL TACHYCARDIA: ICD-10-CM

## 2018-04-17 DIAGNOSIS — K50.819 CROHN'S DISEASE OF SMALL AND LARGE INTESTINES WITH COMPLICATION: Primary | ICD-10-CM

## 2018-04-17 DIAGNOSIS — K50.019 CROHN'S DISEASE OF SMALL INTESTINE WITH COMPLICATION: ICD-10-CM

## 2018-04-17 DIAGNOSIS — F41.1 GENERALIZED ANXIETY DISORDER: ICD-10-CM

## 2018-04-17 PROCEDURE — 99214 OFFICE O/P EST MOD 30 MIN: CPT | Mod: PBBFAC | Performed by: INTERNAL MEDICINE

## 2018-04-17 PROCEDURE — 99999 PR PBB SHADOW E&M-EST. PATIENT-LVL IV: CPT | Mod: PBBFAC,,, | Performed by: INTERNAL MEDICINE

## 2018-04-17 PROCEDURE — 96360 HYDRATION IV INFUSION INIT: CPT

## 2018-04-17 PROCEDURE — 25000003 PHARM REV CODE 250: Performed by: INTERNAL MEDICINE

## 2018-04-17 PROCEDURE — 99214 OFFICE O/P EST MOD 30 MIN: CPT | Mod: S$PBB,,, | Performed by: INTERNAL MEDICINE

## 2018-04-17 RX ORDER — SODIUM CHLORIDE 9 MG/ML
INJECTION, SOLUTION INTRAVENOUS ONCE
Status: DISCONTINUED | OUTPATIENT
Start: 2018-04-18 | End: 2018-04-17 | Stop reason: HOSPADM

## 2018-04-17 RX ORDER — ACETAMINOPHEN 325 MG/1
650 TABLET ORAL
Status: CANCELLED | OUTPATIENT
Start: 2018-04-17

## 2018-04-17 RX ORDER — SODIUM CHLORIDE 9 MG/ML
INJECTION, SOLUTION INTRAVENOUS ONCE
Status: CANCELLED
Start: 2018-04-18 | End: 2018-04-18

## 2018-04-17 RX ORDER — DIPHENHYDRAMINE HYDROCHLORIDE 50 MG/ML
25 INJECTION INTRAMUSCULAR; INTRAVENOUS
Status: CANCELLED | OUTPATIENT
Start: 2018-04-17

## 2018-04-17 RX ORDER — IPRATROPIUM BROMIDE AND ALBUTEROL SULFATE 2.5; .5 MG/3ML; MG/3ML
3 SOLUTION RESPIRATORY (INHALATION)
Status: CANCELLED | OUTPATIENT
Start: 2018-04-17

## 2018-04-17 RX ORDER — EPINEPHRINE 1 MG/ML
0.3 INJECTION, SOLUTION, CONCENTRATE INTRAVENOUS
Status: CANCELLED | OUTPATIENT
Start: 2018-04-17

## 2018-04-17 RX ADMIN — SODIUM CHLORIDE 1000 ML: 0.9 INJECTION, SOLUTION INTRAVENOUS at 02:04

## 2018-04-17 NOTE — PROGRESS NOTES
"Infusion medication:   Normal saline 1 liter  Today's weight:    Wt Readings from Last 1 Encounters:   04/17/18 46.6 kg (102 lb 11.8 oz)         Checklist prior to infusion:  Is the Biologic RX (therapy plan) up to date within the past one year? Yes  Are the most recent labs within the last 3 - 6 months ? Yes  Has the patient had an appointment with the MD/RANCHO that is prescribing the biologic infusion within the last 3 - 6 months? Yes  Documentation of safety questions prior to infusion:   RN TO NOTIFY MD IF "YES" answered to any of below questions prior to infusion:    Symptoms in past 2 weeks? (fever, night sweats, URI or Flu-like symptoms, cough, painful urination, warm/red/painful skin, skin ulcers/wounds, tooth infection/abscess): No  Current symptoms of an active infection? No  Temperature greater than 100.4 in the last 48 hours? No  Recent infections that required antibiotic use in the last 10-14 days?No  If patient has had surgery, any problems with the surgical wound (drainage, redness, tenderness)? No If yes then has surgeon cleared patient prior to getting this infusion? No  Pregnant? No  If yes, is prescribing provider aware of this pregnancy?  No  NEW or WORSENING abdominal pain, diarrhea, nausea/vomiting? No  Any SOB, ankle/feet swelling, or sudden weight gain? No    Patient tolerated infusion well today, vital signs remained normal throughout infusion and patient left with no apparent distress:  Yes  6  Received next infusion date prior to discharge: Yes    Additional note to provider:  No          "

## 2018-04-17 NOTE — PROGRESS NOTES
Subjective:    Patient ID:  Ivy Salgado is a 37 y.o. 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 She reports that she has chest pain- substernal and followed by palpitations with her heart racing hard. She did report that she had lost a good amount of fluids thru the ileostomy and was dehydrated hence trying to drink a lot of water of late. She had ESBL infection and was also treated for 2 weeks with Abx. for urosepsis. I had examined her in clinic post hospital discharge on 2/21/18 and ordered a Holter monitor.    She reports she has CP focal substernal lasting couple of minutes, occurring 10-15 times a day not related to exertion. This CP episode is followed by increased heart rate spell that is sustained for 1-2 hrs however the CP resolves spontaneously in couple of minutes. The Holter monitor did not show any sustained arrhythmia. However her HR ranged between . Patient reports that she did not have any episodes during the 48 hr study. She requests a 30 day monitor- worrying that she may have a dangerous arrhythmia. She never passed out. Feels weak and fatigue.       Past Medical History:   Diagnosis Date    Abnormal Pap smear 2007    Abnormal Pap smear 5/26/2011    Anemia     Anxiety     Arthritis     C. difficile diarrhea     Crohn's disease     Depression 8/5/2017    Encounter for blood transfusion     Genital HSV     History of colposcopy with cervical biopsy 2007 and 7/2011 2007-LYLA I  and 7/2011- LYLA I    Hypertension     Kidney stone     Kidney stone     Recurrent UTI 4/3/2013    S/P ileostomy 7/9/2012    Sterilization 6/23/2012     Past Surgical History:   Procedure Laterality Date    ABDOMINAL SURGERY      APPENDECTOMY      BLADDER  SURGERY      partial cystectomy due to fistula    [facility]      COLON SURGERY      COLONOSCOPY      HYSTERECTOMY  04/16/2015    SHELLIE    ILEOSTOMY      OOPHORECTOMY Right 04/16/2015    PORTACATH PLACEMENT      SKIN BIOPSY      SMALL INTESTINE SURGERY      TOTAL COLECTOMY      TUBAL LIGATION  06 06 2012    UPPER GASTROINTESTINAL ENDOSCOPY       Current Outpatient Prescriptions on File Prior to Visit   Medication Sig Dispense Refill    ALPRAZolam (XANAX) 1 MG tablet TAKE 1 TABLET (1 MG TOTAL) BY MOUTH 2 (TWO) TIMES DAILY AS NEEDED FOR ANXIETY. 60 tablet 0    biotin 1,000 mcg Chew Take 1 tablet by mouth once daily.      diphenoxylate-atropine 2.5-0.025 mg (LOMOTIL) 2.5-0.025 mg per tablet TAKE 1 TABLET BY MOUTH 3 TIMES A DAY AS NEEDED FOR DIARRHEA 90 tablet 0    dronabinol (MARINOL) 2.5 MG capsule TAKE ONE CAPSULE BY MOUTH TWICE A DAY BEFORE MEALS 60 capsule 2    ergocalciferol (ERGOCALCIFEROL) 50,000 unit Cap Take 1 capsule (50,000 Units total) by mouth every 7 days. 12 capsule 3    lisinopril 10 MG tablet TAKE 1 TABLET (10 MG TOTAL) BY MOUTH ONCE DAILY. 90 tablet 2    loperamide (IMODIUM) 2 mg capsule Take 2 mg by mouth daily as needed for Diarrhea.      multivitamin (ONE DAILY MULTIVITAMIN) per tablet Take 1 tablet by mouth once daily.      nitrofurantoin (MACRODANTIN) 100 MG capsule Take 1 capsule (100 mg total) by mouth every 12 (twelve) hours. 10 capsule 0    oxyCODONE-acetaminophen (PERCOCET)  mg per tablet Take 1 tablet by mouth every 6 (six) hours as needed for Pain. 40 tablet 0    potassium citrate 15 mEq TbSR Take 2 tablets by mouth 2 (two) times daily. 360 tablet 3    promethazine (PHENERGAN) 25 MG tablet TAKE 1 TABLET BY MOUTH EVERY 6 HOURS AS NEEDED FOR NAUSEA 30 tablet 3    sumatriptan (IMITREX) 100 MG tablet TAKE 1 TABLET BY MOUTH EVERY 2 HOURS AS NEEDED FOR MIGRAINE. DO NOT EXCEED 2 DOSES/ 24HRS 9 tablet 1    zolpidem (AMBIEN) 10 mg Tab TAKE 1 TABLET (10 MG TOTAL) BY MOUTH  EVERY EVENING. 30 tablet 0    valACYclovir (VALTREX) 500 MG tablet Take 1 tablet (500 mg total) by mouth once daily. 90 tablet 3    [DISCONTINUED] DULoxetine (CYMBALTA) 30 MG capsule Take 1 capsule (30 mg total) by mouth once daily. 30 capsule 11     No current facility-administered medications on file prior to visit.      Review of patient's allergies indicates:   Allergen Reactions    Vancomycin analogues Hives    Azathioprine sodium      Other reaction(s): pancreatitis  Other reaction(s): pancreatitis    Methotrexate analogues        Family History   Problem Relation Age of Onset    Colon cancer Father     Cancer Father      colon cancer    Hypertension Mother     Cancer Maternal Grandfather      esopghal     Skin cancer Maternal Grandfather     Endometrial cancer Maternal Aunt     Crohn's disease Brother     Breast cancer Neg Hx     Ovarian cancer Neg Hx      Social History     Social History    Marital status: Single     Spouse name: N/A    Number of children: N/A    Years of education: N/A     Occupational History     [employer]     Social History Main Topics    Smoking status: Never Smoker    Smokeless tobacco: Never Used    Alcohol use 0.0 oz/week      Comment: Patient only drinks wine not regular basis.    Drug use: No    Sexual activity: Yes     Partners: Male     Birth control/ protection: Pill, Surgical, Condom      Comment: HYST     Other Topics Concern    Not on file     Social History Narrative    No narrative on file       Review of Systems   Constitution: Negative for malaise/fatigue.   HENT: Negative.    Eyes: Negative.    Cardiovascular: Positive for chest pain, irregular heartbeat and palpitations. Negative for claudication, dyspnea on exertion, leg swelling and orthopnea.   Respiratory: Negative for shortness of breath.    Skin: Negative.    Musculoskeletal: Negative.    Gastrointestinal: Positive for abdominal pain.        Has an ileostomy- had increased  "outputs recently   Genitourinary: Negative.    Neurological: Negative.    Psychiatric/Behavioral: Negative.         Objective:    Physical Exam   Constitutional: She is oriented to person, place, and time. She appears well-developed and well-nourished.   HENT:   Head: Normocephalic and atraumatic.   Eyes: Conjunctivae are normal. Pupils are equal, round, and reactive to light.   Neck: Normal range of motion. Neck supple. No JVD present.   Cardiovascular: Normal rate, regular rhythm and normal heart sounds.  Exam reveals no friction rub.    No murmur heard.  Abdominal: Soft. Bowel sounds are normal. She exhibits no distension. There is no tenderness.   Ileostomy in place   Musculoskeletal: Normal range of motion. She exhibits no edema.   Neurological: She is alert and oriented to person, place, and time.   Skin: Skin is warm and dry.   Psychiatric: She has a normal mood and affect. Her behavior is normal.   Vitals reviewed.    /79 (BP Location: Left arm, Patient Position: Sitting, BP Method: Small (Automatic))   Pulse 96   Ht 5' 1" (1.549 m)   Wt 46.6 kg (102 lb 11.8 oz)   LMP 03/30/2015   BMI 19.41 kg/m²       Assessment:       palpitations  Chest pain: non cardiac  Crohn's disease: S/p ileostomy    Plan:           37 year old young female patient with complaints of CP/Palpittaions.   Patient has no cardiovascular risk factors except for HTN- which is controled on ACEi. Multiple EKGs and troponin have been negative in recent hospitalizations at [facility] [place] [place] and [facility]. She had a negative CTA ruling out PE on 1/27/18. Her description of symptoms is not consistent with cardiac etiology.     Palpitations: Reassured the patient that her symptom of palpitation was benign likely secondary to her dehydration episodes ( due to increase ostomy outputs or recurrent UTIs). However she is very concerned about having an underlying arrhythmia that was not detected in the 48 hr Holter period. Will get a 30 day eventr monitor " study.    HTN: She has not been taking lisinopril for a while. Will discontinue ACEi. Her usual BP is in low 100s and she is getting frequent IV saline infusions to keep her well hydrated.     RTC PRN. Will call the patient with event monitor result.     D/w Dr Sinha.     John Saucedo MD, MPH  Cardiovascular Disease Fellow     Pager: [phone]

## 2018-04-23 ENCOUNTER — OFFICE VISIT (OUTPATIENT)
Dept: SURGERY | Facility: CLINIC | Age: 38
End: 2018-04-23
Payer: MEDICAID

## 2018-04-23 ENCOUNTER — CLINICAL SUPPORT (OUTPATIENT)
Dept: ELECTROPHYSIOLOGY | Facility: CLINIC | Age: 38
End: 2018-04-23
Attending: INTERNAL MEDICINE
Payer: MEDICAID

## 2018-04-23 ENCOUNTER — PATIENT MESSAGE (OUTPATIENT)
Dept: SURGERY | Facility: CLINIC | Age: 38
End: 2018-04-23

## 2018-04-23 ENCOUNTER — PATIENT MESSAGE (OUTPATIENT)
Dept: INFECTIOUS DISEASES | Facility: CLINIC | Age: 38
End: 2018-04-23

## 2018-04-23 ENCOUNTER — LAB VISIT (OUTPATIENT)
Dept: LAB | Facility: HOSPITAL | Age: 38
End: 2018-04-23
Attending: INTERNAL MEDICINE
Payer: MEDICAID

## 2018-04-23 ENCOUNTER — TELEPHONE (OUTPATIENT)
Dept: INFECTIOUS DISEASES | Facility: CLINIC | Age: 38
End: 2018-04-23

## 2018-04-23 VITALS
HEART RATE: 95 BPM | TEMPERATURE: 99 F | HEIGHT: 61 IN | WEIGHT: 102.88 LBS | SYSTOLIC BLOOD PRESSURE: 145 MMHG | DIASTOLIC BLOOD PRESSURE: 93 MMHG | BODY MASS INDEX: 19.43 KG/M2

## 2018-04-23 DIAGNOSIS — R00.2 PALPITATIONS: ICD-10-CM

## 2018-04-23 DIAGNOSIS — E55.9 VITAMIN D DEFICIENCY: ICD-10-CM

## 2018-04-23 DIAGNOSIS — N30.90 CYSTITIS: Primary | ICD-10-CM

## 2018-04-23 DIAGNOSIS — Z95.828 PORT CATHETER IN PLACE: Primary | ICD-10-CM

## 2018-04-23 LAB
25(OH)D3+25(OH)D2 SERPL-MCNC: 25 NG/ML
FSH SERPL-ACNC: 25.4 MIU/ML
LH SERPL-ACNC: 13.7 MIU/ML

## 2018-04-23 PROCEDURE — 36415 COLL VENOUS BLD VENIPUNCTURE: CPT

## 2018-04-23 PROCEDURE — 99213 OFFICE O/P EST LOW 20 MIN: CPT | Mod: PBBFAC,25 | Performed by: SURGERY

## 2018-04-23 PROCEDURE — 99212 OFFICE O/P EST SF 10 MIN: CPT | Mod: S$PBB,,, | Performed by: SURGERY

## 2018-04-23 PROCEDURE — 93270 REMOTE 30 DAY ECG REV/REPORT: CPT | Mod: PBBFAC | Performed by: INTERNAL MEDICINE

## 2018-04-23 PROCEDURE — 83002 ASSAY OF GONADOTROPIN (LH): CPT

## 2018-04-23 PROCEDURE — 83001 ASSAY OF GONADOTROPIN (FSH): CPT

## 2018-04-23 PROCEDURE — 99999 PR PBB SHADOW E&M-EST. PATIENT-LVL III: CPT | Mod: PBBFAC,,, | Performed by: SURGERY

## 2018-04-23 PROCEDURE — 82306 VITAMIN D 25 HYDROXY: CPT

## 2018-04-23 NOTE — PROGRESS NOTES
HPI:  The patient is status post-port placement Feb 2017.  Recently it was accessed and she has some soreness around it.  No swelling or redness but concerned as this is a new feeling.      PHYSICAL EXAM:  Physical Exam     In NAD  Port in place.  No redness, no swelling.  Mildly tender    ASSESSMENT:    No abnormalaities, port in good position     PLAN:  Call if continued disomfort.            Parish Sanchez MD

## 2018-04-23 NOTE — TELEPHONE ENCOUNTER
Patient with persistent urinary urgency - repeat urinalysis and urine culture.  Patient will  urine sample order and cup from ID clinic.

## 2018-04-26 ENCOUNTER — PATIENT MESSAGE (OUTPATIENT)
Dept: ENDOCRINOLOGY | Facility: CLINIC | Age: 38
End: 2018-04-26

## 2018-04-26 DIAGNOSIS — M85.80 LOW BONE MASS: Primary | ICD-10-CM

## 2018-05-01 ENCOUNTER — PATIENT MESSAGE (OUTPATIENT)
Dept: GASTROENTEROLOGY | Facility: CLINIC | Age: 38
End: 2018-05-01

## 2018-05-01 DIAGNOSIS — K50.819 CROHN'S DISEASE OF SMALL AND LARGE INTESTINES WITH COMPLICATION: ICD-10-CM

## 2018-05-01 RX ORDER — DIPHENOXYLATE HYDROCHLORIDE AND ATROPINE SULFATE 2.5; .025 MG/1; MG/1
1 TABLET ORAL 4 TIMES DAILY PRN
Qty: 90 TABLET | Refills: 0 | Status: SHIPPED | OUTPATIENT
Start: 2018-05-01 | End: 2018-06-01 | Stop reason: SDUPTHER

## 2018-05-01 RX ORDER — OXYCODONE AND ACETAMINOPHEN 10; 325 MG/1; MG/1
1 TABLET ORAL EVERY 6 HOURS PRN
Qty: 40 TABLET | Refills: 0 | Status: SHIPPED | OUTPATIENT
Start: 2018-05-01 | End: 2018-06-01 | Stop reason: SDUPTHER

## 2018-05-02 ENCOUNTER — PATIENT MESSAGE (OUTPATIENT)
Dept: GASTROENTEROLOGY | Facility: CLINIC | Age: 38
End: 2018-05-02

## 2018-05-05 DIAGNOSIS — K50.819 CROHN'S DISEASE OF BOTH SMALL AND LARGE INTESTINE WITH COMPLICATION: ICD-10-CM

## 2018-05-05 DIAGNOSIS — Z79.899 ENCOUNTER FOR LONG-TERM (CURRENT) USE OF HIGH-RISK MEDICATION: ICD-10-CM

## 2018-05-05 RX ORDER — DRONABINOL 2.5 MG/1
2.5 CAPSULE ORAL
Qty: 60 CAPSULE | Refills: 2 | Status: ON HOLD | OUTPATIENT
Start: 2018-05-05 | End: 2018-09-07 | Stop reason: SDUPTHER

## 2018-05-07 RX ORDER — ZOLPIDEM TARTRATE 10 MG/1
10 TABLET ORAL NIGHTLY
Qty: 30 TABLET | Refills: 0 | Status: SHIPPED | OUTPATIENT
Start: 2018-05-07 | End: 2018-06-06 | Stop reason: SDUPTHER

## 2018-05-12 ENCOUNTER — PATIENT MESSAGE (OUTPATIENT)
Dept: INTERNAL MEDICINE | Facility: CLINIC | Age: 38
End: 2018-05-12

## 2018-05-14 ENCOUNTER — PATIENT MESSAGE (OUTPATIENT)
Dept: GASTROENTEROLOGY | Facility: CLINIC | Age: 38
End: 2018-05-14

## 2018-05-14 ENCOUNTER — TELEPHONE (OUTPATIENT)
Dept: INTERNAL MEDICINE | Facility: CLINIC | Age: 38
End: 2018-05-14

## 2018-05-14 RX ORDER — ALPRAZOLAM 1 MG/1
1 TABLET ORAL 2 TIMES DAILY PRN
Qty: 60 TABLET | Refills: 0 | Status: SHIPPED | OUTPATIENT
Start: 2018-05-14 | End: 2018-06-12 | Stop reason: SDUPTHER

## 2018-05-15 RX ORDER — PROMETHAZINE HYDROCHLORIDE 25 MG/1
25 TABLET ORAL EVERY 6 HOURS PRN
Qty: 30 TABLET | Refills: 3 | Status: SHIPPED | OUTPATIENT
Start: 2018-05-15 | End: 2019-07-19 | Stop reason: SDUPTHER

## 2018-05-15 RX ORDER — ERGOCALCIFEROL 1.25 MG/1
50000 CAPSULE ORAL
Qty: 12 CAPSULE | Refills: 3 | Status: SHIPPED | OUTPATIENT
Start: 2018-05-15 | End: 2018-12-24

## 2018-05-17 ENCOUNTER — OFFICE VISIT (OUTPATIENT)
Dept: INTERNAL MEDICINE | Facility: CLINIC | Age: 38
End: 2018-05-17
Payer: MEDICAID

## 2018-05-17 ENCOUNTER — LAB VISIT (OUTPATIENT)
Dept: LAB | Facility: OTHER | Age: 38
End: 2018-05-17
Attending: UROLOGY
Payer: MEDICAID

## 2018-05-17 VITALS
WEIGHT: 100.5 LBS | OXYGEN SATURATION: 99 % | HEIGHT: 61 IN | DIASTOLIC BLOOD PRESSURE: 82 MMHG | HEART RATE: 61 BPM | SYSTOLIC BLOOD PRESSURE: 110 MMHG | BODY MASS INDEX: 18.98 KG/M2

## 2018-05-17 DIAGNOSIS — N20.0 NEPHROLITHIASIS: ICD-10-CM

## 2018-05-17 DIAGNOSIS — R06.01 ORTHOPNEA: ICD-10-CM

## 2018-05-17 DIAGNOSIS — R06.01 ORTHOPNEA: Primary | ICD-10-CM

## 2018-05-17 LAB
ALBUMIN SERPL BCP-MCNC: 3.8 G/DL
ALP SERPL-CCNC: 75 U/L
ALT SERPL W/O P-5'-P-CCNC: 16 U/L
ANION GAP SERPL CALC-SCNC: 10 MMOL/L
ANION GAP SERPL CALC-SCNC: 10 MMOL/L
AST SERPL-CCNC: 23 U/L
BASOPHILS # BLD AUTO: 0.02 K/UL
BASOPHILS NFR BLD: 0.3 %
BILIRUB SERPL-MCNC: 0.5 MG/DL
BNP SERPL-MCNC: 35 PG/ML
BUN SERPL-MCNC: 6 MG/DL
BUN SERPL-MCNC: 6 MG/DL
CALCIUM SERPL-MCNC: 9.3 MG/DL
CALCIUM SERPL-MCNC: 9.3 MG/DL
CHLORIDE SERPL-SCNC: 106 MMOL/L
CHLORIDE SERPL-SCNC: 106 MMOL/L
CO2 SERPL-SCNC: 24 MMOL/L
CO2 SERPL-SCNC: 24 MMOL/L
CREAT SERPL-MCNC: 0.8 MG/DL
CREAT SERPL-MCNC: 0.8 MG/DL
DIFFERENTIAL METHOD: NORMAL
EOSINOPHIL # BLD AUTO: 0.1 K/UL
EOSINOPHIL NFR BLD: 0.9 %
ERYTHROCYTE [DISTWIDTH] IN BLOOD BY AUTOMATED COUNT: 13.9 %
EST. GFR  (AFRICAN AMERICAN): >60 ML/MIN/1.73 M^2
EST. GFR  (AFRICAN AMERICAN): >60 ML/MIN/1.73 M^2
EST. GFR  (NON AFRICAN AMERICAN): >60 ML/MIN/1.73 M^2
EST. GFR  (NON AFRICAN AMERICAN): >60 ML/MIN/1.73 M^2
GLUCOSE SERPL-MCNC: 94 MG/DL
GLUCOSE SERPL-MCNC: 94 MG/DL
HCT VFR BLD AUTO: 41.3 %
HGB BLD-MCNC: 13.3 G/DL
LYMPHOCYTES # BLD AUTO: 1.9 K/UL
LYMPHOCYTES NFR BLD: 29.5 %
MCH RBC QN AUTO: 29.8 PG
MCHC RBC AUTO-ENTMCNC: 32.2 G/DL
MCV RBC AUTO: 92 FL
MONOCYTES # BLD AUTO: 0.5 K/UL
MONOCYTES NFR BLD: 7.6 %
NEUTROPHILS # BLD AUTO: 4 K/UL
NEUTROPHILS NFR BLD: 61.5 %
PLATELET # BLD AUTO: 287 K/UL
PMV BLD AUTO: 9.7 FL
POTASSIUM SERPL-SCNC: 3.4 MMOL/L
POTASSIUM SERPL-SCNC: 3.4 MMOL/L
PROT SERPL-MCNC: 7.9 G/DL
RBC # BLD AUTO: 4.47 M/UL
SODIUM SERPL-SCNC: 140 MMOL/L
SODIUM SERPL-SCNC: 140 MMOL/L
WBC # BLD AUTO: 6.54 K/UL

## 2018-05-17 PROCEDURE — 85025 COMPLETE CBC W/AUTO DIFF WBC: CPT

## 2018-05-17 PROCEDURE — 99999 PR PBB SHADOW E&M-EST. PATIENT-LVL III: CPT | Mod: PBBFAC,,, | Performed by: INTERNAL MEDICINE

## 2018-05-17 PROCEDURE — 83880 ASSAY OF NATRIURETIC PEPTIDE: CPT

## 2018-05-17 PROCEDURE — 99213 OFFICE O/P EST LOW 20 MIN: CPT | Mod: S$PBB,,, | Performed by: INTERNAL MEDICINE

## 2018-05-17 PROCEDURE — 36415 COLL VENOUS BLD VENIPUNCTURE: CPT

## 2018-05-17 PROCEDURE — 99213 OFFICE O/P EST LOW 20 MIN: CPT | Mod: PBBFAC | Performed by: INTERNAL MEDICINE

## 2018-05-17 PROCEDURE — 80053 COMPREHEN METABOLIC PANEL: CPT

## 2018-05-17 RX ORDER — MAGNESIUM 250 MG
TABLET ORAL ONCE
COMMUNITY
End: 2019-05-28

## 2018-05-17 NOTE — PROGRESS NOTES
Subjective:       Patient ID: Ivy Salgado is a 37 y.o. female.    Chief Complaint: Shortness of Breath    Pt c/o 10 days of orthopnea. Occurs every time she lays down but better when she gets up. No associated palpitations or cp. No edema or new SOB otherwise. She has been completing 30 days holter monitor for chest tightness and palpitations. She had normal ECHO 2/2018. No cough. No leg swelling/pain. CXR 3 mos ago was normal as well.       Review of Systems   Constitutional: Negative for unexpected weight change.   Respiratory: Positive for shortness of breath.    Cardiovascular: Negative for chest pain.   Psychiatric/Behavioral: Negative for dysphoric mood.       Objective:      Physical Exam   Constitutional: She is oriented to person, place, and time. She appears well-developed and well-nourished.   Neck: Neck supple. No thyromegaly present.   Cardiovascular: Normal rate, regular rhythm and normal heart sounds.    Pulmonary/Chest: Effort normal and breath sounds normal.   Musculoskeletal: She exhibits no edema.   Lymphadenopathy:     She has no cervical adenopathy.   Neurological: She is alert and oriented to person, place, and time. No cranial nerve deficit.   Psychiatric: She has a normal mood and affect. Her behavior is normal.       Assessment:       1. Orthopnea        Plan:       1. Appropriate labs  2. Await holter results  3. No clear evidence for cardiopulmonary etiology at this time; RTC and ED prompts d/w pt and she understands

## 2018-05-24 ENCOUNTER — PATIENT MESSAGE (OUTPATIENT)
Dept: CARDIOLOGY | Facility: CLINIC | Age: 38
End: 2018-05-24

## 2018-05-24 ENCOUNTER — PATIENT MESSAGE (OUTPATIENT)
Dept: ENDOCRINOLOGY | Facility: CLINIC | Age: 38
End: 2018-05-24

## 2018-05-29 ENCOUNTER — PATIENT MESSAGE (OUTPATIENT)
Dept: ENDOCRINOLOGY | Facility: CLINIC | Age: 38
End: 2018-05-29

## 2018-05-29 ENCOUNTER — OFFICE VISIT (OUTPATIENT)
Dept: URGENT CARE | Facility: CLINIC | Age: 38
End: 2018-05-29
Payer: MEDICAID

## 2018-05-29 VITALS
RESPIRATION RATE: 18 BRPM | TEMPERATURE: 97 F | SYSTOLIC BLOOD PRESSURE: 139 MMHG | DIASTOLIC BLOOD PRESSURE: 96 MMHG | WEIGHT: 100 LBS | OXYGEN SATURATION: 96 % | HEIGHT: 61 IN | HEART RATE: 105 BPM | BODY MASS INDEX: 18.88 KG/M2

## 2018-05-29 DIAGNOSIS — S90.32XA CONTUSION OF LEFT FOOT, INITIAL ENCOUNTER: Primary | ICD-10-CM

## 2018-05-29 PROCEDURE — 99214 OFFICE O/P EST MOD 30 MIN: CPT | Mod: S$GLB,,, | Performed by: PHYSICIAN ASSISTANT

## 2018-05-29 NOTE — PATIENT INSTRUCTIONS
-Apply ice, take anti-inflammatories, elevate extremity, and rest to help alleviate pain and reduce swelling.  -Follow up with your primary care provider if symptoms do not improve with conservative management.    Please follow up with your primary care provider within 2-5 days if your signs and symptoms have not resolved or worsen.     If your condition worsens or fails to improve we recommend that you receive another evaluation at the emergency room immediately or contact your primary medical clinic to discuss your concerns.   You must understand that you have received an Urgent Care treatment only and that you may be released before all of your medical problems are known or treated. You, the patient, will arrange for follow up care as instructed.         Foot Contusion  You have a contusion. This is also called a bruise. There is swelling and some bleeding under the skin, but no broken bones. This injury generally takes a few days to a few weeks to heal.  During that time, the bruise will typically change in color from reddish, to purple-blue, to greenish-yellow, then to yellow-brown.  Home care  · Elevate the foot to reduce pain and swelling. As much as possible, sit or lie down with the foot raised about the level of your heart. This is especially important during the first 48 hours.  · Ice the foot to help reduce pain and swelling. Wrap a cold source (ice pack or ice cubes in a plastic bag) in a thin towel. Apply to the bruised area for 20 minutes every 1 to 2 hours the first day. Continue this 3 to 4 times a day until the pain and swelling goes away.  · Unless another medicine was prescribed, you can take acetaminophen, ibuprofen, or naproxen to control pain. (If you have chronic liver or kidney disease or ever had a stomach ulcer or gastrointestinal bleeding, talk with your healthcare provider before using these medicines.)  Follow up  Follow up with your healthcare provider or our staff as advised. Call if  you are not improving within 1 to 2 weeks.  When to seek medical advice   Call your healthcare provider right away if you have any of the following:  · Increased pain or swelling  · Foot or leg becomes cold, blue, numb or tingly  · Signs of infection: Warmth, drainage, or increased redness or pain around the bruise  · Inability to move the injured foot   · Frequent bruising for unknown reasons  Date Last Reviewed: 2/1/2017  © 4227-2947 [employer]. [address] [place] [id number]. All rights reserved. This information is not intended as a substitute for professional medical care. Always follow your healthcare professional's instructions.

## 2018-05-29 NOTE — PROGRESS NOTES
"Subjective:       Patient ID: Ivy Salgado is a 37 y.o. female.    Vitals:  height is 5' 1" (1.549 m) and weight is 45.4 kg (100 lb). Her temperature is 97.4 °F (36.3 °C). Her blood pressure is 139/96 (abnormal) and her pulse is 105. Her respiration is 18 and oxygen saturation is 96%.     Chief Complaint: Foot Injury    Pt hit her left foot on a metal chair.      Foot Injury    The incident occurred 3 to 5 days ago. The injury mechanism was a direct blow. The pain is present in the left foot. The quality of the pain is described as aching (throbbing). The pain is at a severity of 7/10. The pain is moderate. The pain has been constant since onset. Pertinent negatives include no numbness. The symptoms are aggravated by weight bearing. She has tried NSAIDs and ice for the symptoms. The treatment provided mild relief.     Review of Systems   Constitution: Negative for weakness and malaise/fatigue.   HENT: Negative for nosebleeds.    Cardiovascular: Negative for chest pain and syncope.   Respiratory: Negative for shortness of breath.    Musculoskeletal: Positive for joint pain (left foot). Negative for back pain and neck pain.   Gastrointestinal: Negative for abdominal pain.   Genitourinary: Negative for hematuria.   Neurological: Negative for dizziness and numbness.   All other systems reviewed and are negative.      Objective:      Physical Exam   Constitutional: She is oriented to person, place, and time. She appears well-developed and well-nourished. She does not appear ill. No distress.   HENT:   Head: Normocephalic and atraumatic.   Right Ear: External ear normal.   Left Ear: External ear normal.   Nose: Nose normal.   Eyes: Conjunctivae, EOM and lids are normal. Right eye exhibits no discharge. Left eye exhibits no discharge.   Neck: Normal range of motion. Neck supple.   Cardiovascular: Normal rate, regular rhythm and normal heart sounds.  Exam reveals no gallop and no friction rub.    No murmur " heard.  Pulmonary/Chest: Effort normal and breath sounds normal. No respiratory distress. She has no decreased breath sounds. She has no wheezes. She has no rhonchi. She has no rales.   Musculoskeletal: Normal range of motion.        Left foot: There is tenderness and bony tenderness. There is normal range of motion, no swelling, normal capillary refill, no crepitus, no deformity and no laceration.        Feet:    Neurological: She is alert and oriented to person, place, and time. She has normal strength. No sensory deficit.   Skin: Skin is warm and dry. Bruising (left foot) noted. No rash noted. She is not diaphoretic. No erythema.   Psychiatric: She has a normal mood and affect. Her behavior is normal.   Nursing note and vitals reviewed.      Assessment:       1. Contusion of left foot, initial encounter      X-ray Foot Complete Left    Result Date: 5/29/2018  EXAMINATION: XR FOOT COMPLETE 3 VIEW LEFT CLINICAL HISTORY: Unspecified injury of left foot, initial encounter TECHNIQUE: Three views left foot COMPARISON: None FINDINGS: No evidence of displaced fracture, dislocation or bone destruction.  No abnormal radiopaque retained foreign body.  Possible remote fracture of the proximal phalanx on the 5th toe.     No acute bony abnormality. Possible remote fracture on the proximal phalanx to the fixed 5th toe. Electronically signed by: Salomon Alvarado MD Date:    05/29/2018 Time:    13:43    Plan:       Pt states she had an old fracture of her 5th toe. Correlated with physical exam - no bony tenderness in area specified. Discussed treatment options with patient. Patient expressed verbal understanding and agreement with treatment plan.     Contusion of left foot, initial encounter  -     Cancel: X-Ray Foot Complete Right; Future; Expected date: 05/29/2018  -     X-Ray Foot Complete Left; Future; Expected date: 05/29/2018      Patient Instructions   -Apply ice, take anti-inflammatories, elevate extremity, and rest to help  alleviate pain and reduce swelling.  -Follow up with your primary care provider if symptoms do not improve with conservative management.    Please follow up with your primary care provider within 2-5 days if your signs and symptoms have not resolved or worsen.     If your condition worsens or fails to improve we recommend that you receive another evaluation at the emergency room immediately or contact your primary medical clinic to discuss your concerns.   You must understand that you have received an Urgent Care treatment only and that you may be released before all of your medical problems are known or treated. You, the patient, will arrange for follow up care as instructed.         Foot Contusion  You have a contusion. This is also called a bruise. There is swelling and some bleeding under the skin, but no broken bones. This injury generally takes a few days to a few weeks to heal.  During that time, the bruise will typically change in color from reddish, to purple-blue, to greenish-yellow, then to yellow-brown.  Home care  · Elevate the foot to reduce pain and swelling. As much as possible, sit or lie down with the foot raised about the level of your heart. This is especially important during the first 48 hours.  · Ice the foot to help reduce pain and swelling. Wrap a cold source (ice pack or ice cubes in a plastic bag) in a thin towel. Apply to the bruised area for 20 minutes every 1 to 2 hours the first day. Continue this 3 to 4 times a day until the pain and swelling goes away.  · Unless another medicine was prescribed, you can take acetaminophen, ibuprofen, or naproxen to control pain. (If you have chronic liver or kidney disease or ever had a stomach ulcer or gastrointestinal bleeding, talk with your healthcare provider before using these medicines.)  Follow up  Follow up with your healthcare provider or our staff as advised. Call if you are not improving within 1 to 2 weeks.  When to seek medical  advice   Call your healthcare provider right away if you have any of the following:  · Increased pain or swelling  · Foot or leg becomes cold, blue, numb or tingly  · Signs of infection: Warmth, drainage, or increased redness or pain around the bruise  · Inability to move the injured foot   · Frequent bruising for unknown reasons  Date Last Reviewed: 2/1/2017  © 8994-5414 [employer]. [address] [place] [address]. All rights reserved. This information is not intended as a substitute for professional medical care. Always follow your healthcare professional's instructions.

## 2018-05-30 PROCEDURE — 93272 ECG/REVIEW INTERPRET ONLY: CPT | Mod: ,,, | Performed by: INTERNAL MEDICINE

## 2018-05-31 ENCOUNTER — PATIENT MESSAGE (OUTPATIENT)
Dept: SURGERY | Facility: CLINIC | Age: 38
End: 2018-05-31

## 2018-06-01 ENCOUNTER — PATIENT MESSAGE (OUTPATIENT)
Dept: INTERNAL MEDICINE | Facility: CLINIC | Age: 38
End: 2018-06-01

## 2018-06-01 ENCOUNTER — TELEPHONE (OUTPATIENT)
Dept: INTERNAL MEDICINE | Facility: CLINIC | Age: 38
End: 2018-06-01

## 2018-06-01 DIAGNOSIS — K50.819 CROHN'S DISEASE OF SMALL AND LARGE INTESTINES WITH COMPLICATION: ICD-10-CM

## 2018-06-01 RX ORDER — DIPHENOXYLATE HYDROCHLORIDE AND ATROPINE SULFATE 2.5; .025 MG/1; MG/1
1 TABLET ORAL 4 TIMES DAILY PRN
Qty: 30 TABLET | Refills: 0 | Status: SHIPPED | OUTPATIENT
Start: 2018-06-01 | End: 2018-06-01 | Stop reason: SDUPTHER

## 2018-06-01 RX ORDER — OXYCODONE AND ACETAMINOPHEN 10; 325 MG/1; MG/1
1 TABLET ORAL EVERY 6 HOURS PRN
Qty: 20 TABLET | Refills: 0 | Status: SHIPPED | OUTPATIENT
Start: 2018-06-01 | End: 2018-06-18 | Stop reason: SDUPTHER

## 2018-06-01 RX ORDER — DIPHENOXYLATE HYDROCHLORIDE AND ATROPINE SULFATE 2.5; .025 MG/1; MG/1
1 TABLET ORAL 4 TIMES DAILY PRN
Qty: 30 TABLET | Refills: 0 | Status: SHIPPED | OUTPATIENT
Start: 2018-06-01 | End: 2018-06-18 | Stop reason: SDUPTHER

## 2018-06-01 RX ORDER — OXYCODONE AND ACETAMINOPHEN 10; 325 MG/1; MG/1
1 TABLET ORAL EVERY 6 HOURS PRN
Qty: 20 TABLET | Refills: 0 | Status: SHIPPED | OUTPATIENT
Start: 2018-06-01 | End: 2018-06-01 | Stop reason: SDUPTHER

## 2018-06-01 NOTE — TELEPHONE ENCOUNTER
----- Message from Juma Lee sent at 6/1/2018  1:38 PM CDT -----  Contact: patient            Name of Who is Calling: Ivy      What is the request in detail: patient called stated [facility] cannot fill her prescriptions and is requesting that you resend Rx diphenoxylate-atropine 2.5-0.025 mg (LOMOTIL) 2.5-0.025 mg per tablet and oxyCODONE-acetaminophen (PERCOCET)  mg per tablet to [facility] [place] [id number] [address]  AT [facility] OF [facility] [address] [phone]       Can the clinic reply by [facility]: no      What Number to Call Back if not in [facility]: [phone]

## 2018-06-04 RX ORDER — TERCONAZOLE 4 MG/G
CREAM VAGINAL
Qty: 45 G | Refills: 0 | Status: SHIPPED | OUTPATIENT
Start: 2018-06-04 | End: 2018-08-28 | Stop reason: SDUPTHER

## 2018-06-06 ENCOUNTER — INFUSION (OUTPATIENT)
Dept: INFECTIOUS DISEASES | Facility: HOSPITAL | Age: 38
End: 2018-06-06
Attending: INTERNAL MEDICINE
Payer: MEDICAID

## 2018-06-06 VITALS — WEIGHT: 100.06 LBS | HEIGHT: 61 IN | BODY MASS INDEX: 18.89 KG/M2

## 2018-06-06 DIAGNOSIS — K50.819 CROHN'S DISEASE OF SMALL AND LARGE INTESTINES WITH COMPLICATION: Primary | ICD-10-CM

## 2018-06-06 PROCEDURE — 96360 HYDRATION IV INFUSION INIT: CPT

## 2018-06-06 PROCEDURE — 96523 IRRIG DRUG DELIVERY DEVICE: CPT

## 2018-06-06 PROCEDURE — 96361 HYDRATE IV INFUSION ADD-ON: CPT

## 2018-06-06 PROCEDURE — 25000003 PHARM REV CODE 250: Performed by: INTERNAL MEDICINE

## 2018-06-06 RX ORDER — ACETAMINOPHEN 325 MG/1
650 TABLET ORAL
Status: CANCELLED | OUTPATIENT
Start: 2018-06-06

## 2018-06-06 RX ORDER — EPINEPHRINE 1 MG/ML
0.3 INJECTION, SOLUTION, CONCENTRATE INTRAVENOUS
Status: CANCELLED | OUTPATIENT
Start: 2018-06-06

## 2018-06-06 RX ORDER — ZOLPIDEM TARTRATE 10 MG/1
TABLET ORAL
Qty: 30 TABLET | Refills: 0 | Status: SHIPPED | OUTPATIENT
Start: 2018-06-06 | End: 2018-07-03 | Stop reason: SDUPTHER

## 2018-06-06 RX ORDER — DIPHENHYDRAMINE HYDROCHLORIDE 50 MG/ML
25 INJECTION INTRAMUSCULAR; INTRAVENOUS
Status: CANCELLED | OUTPATIENT
Start: 2018-06-06

## 2018-06-06 RX ORDER — IPRATROPIUM BROMIDE AND ALBUTEROL SULFATE 2.5; .5 MG/3ML; MG/3ML
3 SOLUTION RESPIRATORY (INHALATION)
Status: CANCELLED | OUTPATIENT
Start: 2018-06-06

## 2018-06-06 RX ADMIN — SODIUM CHLORIDE 2000 ML: 0.9 INJECTION, SOLUTION INTRAVENOUS at 01:06

## 2018-06-06 NOTE — PROGRESS NOTES
Pt's left subclavian medi port accessed without incidence,  Pt tolerated normal saline bolus x 2 Literst, without incidence, medi port locked with Heparin flush 500 Units/5ml, pt deaccessed, no bleeding noted to site upon removal, band aid in place, tolerated well, left unit in NAD

## 2018-06-12 RX ORDER — ALPRAZOLAM 1 MG/1
TABLET ORAL
Qty: 60 TABLET | Refills: 0 | Status: SHIPPED | OUTPATIENT
Start: 2018-06-12 | End: 2018-07-11 | Stop reason: SDUPTHER

## 2018-06-15 ENCOUNTER — PATIENT MESSAGE (OUTPATIENT)
Dept: GASTROENTEROLOGY | Facility: CLINIC | Age: 38
End: 2018-06-15

## 2018-06-18 ENCOUNTER — TELEPHONE (OUTPATIENT)
Dept: GASTROENTEROLOGY | Facility: CLINIC | Age: 38
End: 2018-06-18

## 2018-06-18 DIAGNOSIS — K50.819 CROHN'S DISEASE OF SMALL AND LARGE INTESTINES WITH COMPLICATION: ICD-10-CM

## 2018-06-18 RX ORDER — DIPHENOXYLATE HYDROCHLORIDE AND ATROPINE SULFATE 2.5; .025 MG/1; MG/1
1 TABLET ORAL 4 TIMES DAILY PRN
Qty: 100 TABLET | Refills: 0 | Status: SHIPPED | OUTPATIENT
Start: 2018-06-18 | End: 2018-07-17 | Stop reason: SDUPTHER

## 2018-06-18 RX ORDER — OXYCODONE AND ACETAMINOPHEN 10; 325 MG/1; MG/1
1 TABLET ORAL EVERY 6 HOURS PRN
Qty: 40 TABLET | Refills: 0 | Status: SHIPPED | OUTPATIENT
Start: 2018-06-18 | End: 2018-07-17 | Stop reason: SDUPTHER

## 2018-06-18 NOTE — TELEPHONE ENCOUNTER
----- Message from Yajaira Dean sent at 6/18/2018 12:57 PM CDT -----  Patient Returning Call from [facility]    Who Left Message for Patient: Oralia  Communication Preference: [phone]   Additional Information:  Pt asked that a msg be sent vis portal if she does not answer her phone.

## 2018-06-19 RX ORDER — SUMATRIPTAN SUCCINATE 100 MG/1
100 TABLET ORAL ONCE
Qty: 9 TABLET | Refills: 1 | Status: ON HOLD | OUTPATIENT
Start: 2018-06-19 | End: 2018-09-20 | Stop reason: HOSPADM

## 2018-07-01 ENCOUNTER — PATIENT MESSAGE (OUTPATIENT)
Dept: OBSTETRICS AND GYNECOLOGY | Facility: CLINIC | Age: 38
End: 2018-07-01

## 2018-07-01 DIAGNOSIS — N83.209 CYST OF OVARY, UNSPECIFIED LATERALITY: Primary | ICD-10-CM

## 2018-07-02 NOTE — TELEPHONE ENCOUNTER
Pt states the cyst she has on her right side has been bothering her a lot this past week. Pt previous had U/S 3/18 and declined depo at the time for management, stated she would f/u after a while to see it problem resolved.

## 2018-07-02 NOTE — TELEPHONE ENCOUNTER
It wouldnt be a bad idea for another ultrasound. Orders signed for outpatient center since she works at [employer]

## 2018-07-03 ENCOUNTER — INFUSION (OUTPATIENT)
Dept: INFECTIOUS DISEASES | Facility: HOSPITAL | Age: 38
End: 2018-07-03
Attending: INTERNAL MEDICINE
Payer: MEDICAID

## 2018-07-03 ENCOUNTER — HOSPITAL ENCOUNTER (OUTPATIENT)
Dept: RADIOLOGY | Facility: OTHER | Age: 38
Discharge: HOME | End: 2018-07-03
Attending: OBSTETRICS & GYNECOLOGY
Payer: MEDICAID

## 2018-07-03 VITALS
RESPIRATION RATE: 16 BRPM | HEART RATE: 80 BPM | HEIGHT: 61 IN | TEMPERATURE: 100 F | SYSTOLIC BLOOD PRESSURE: 116 MMHG | DIASTOLIC BLOOD PRESSURE: 83 MMHG | WEIGHT: 101.44 LBS | BODY MASS INDEX: 19.15 KG/M2

## 2018-07-03 DIAGNOSIS — N83.209 CYST OF OVARY, UNSPECIFIED LATERALITY: ICD-10-CM

## 2018-07-03 DIAGNOSIS — K50.819 CROHN'S DISEASE OF SMALL AND LARGE INTESTINES WITH COMPLICATION: Primary | ICD-10-CM

## 2018-07-03 PROCEDURE — 76856 US EXAM PELVIC COMPLETE: CPT | Mod: 26,,, | Performed by: RADIOLOGY

## 2018-07-03 PROCEDURE — 76830 TRANSVAGINAL US NON-OB: CPT | Mod: 26,,, | Performed by: RADIOLOGY

## 2018-07-03 PROCEDURE — 76830 TRANSVAGINAL US NON-OB: CPT | Mod: TC

## 2018-07-03 PROCEDURE — 96361 HYDRATE IV INFUSION ADD-ON: CPT

## 2018-07-03 PROCEDURE — 25000003 PHARM REV CODE 250: Performed by: INTERNAL MEDICINE

## 2018-07-03 PROCEDURE — 96360 HYDRATION IV INFUSION INIT: CPT

## 2018-07-03 RX ORDER — ZOLPIDEM TARTRATE 10 MG/1
TABLET ORAL
Qty: 30 TABLET | Refills: 0 | Status: SHIPPED | OUTPATIENT
Start: 2018-07-03 | End: 2018-08-03 | Stop reason: SDUPTHER

## 2018-07-03 RX ADMIN — SODIUM CHLORIDE 2000 ML: 0.9 INJECTION, SOLUTION INTRAVENOUS at 08:07

## 2018-07-03 NOTE — PROGRESS NOTES
Pt's left subclavian medi port accessed without incidence,  Pt tolerated normal saline bolus x 2 Liters, without incidence, medi port locked with Heparin flush 500 Units/5ml, pt deaccessed, no bleeding noted to site upon removal, band aid in place, tolerated well, left unit in NAD

## 2018-07-09 ENCOUNTER — PATIENT MESSAGE (OUTPATIENT)
Dept: OBSTETRICS AND GYNECOLOGY | Facility: CLINIC | Age: 38
End: 2018-07-09

## 2018-07-11 RX ORDER — NORETHINDRONE ACETATE AND ETHINYL ESTRADIOL 1MG-20(21)
1 KIT ORAL DAILY
Qty: 28 TABLET | Refills: 5 | Status: SHIPPED | OUTPATIENT
Start: 2018-07-11 | End: 2018-12-24

## 2018-07-11 RX ORDER — ALPRAZOLAM 1 MG/1
TABLET ORAL
Qty: 60 TABLET | Refills: 0 | Status: SHIPPED | OUTPATIENT
Start: 2018-07-11 | End: 2018-08-12 | Stop reason: SDUPTHER

## 2018-07-17 ENCOUNTER — PATIENT MESSAGE (OUTPATIENT)
Dept: GASTROENTEROLOGY | Facility: CLINIC | Age: 38
End: 2018-07-17

## 2018-07-17 DIAGNOSIS — K50.819 CROHN'S DISEASE OF SMALL AND LARGE INTESTINES WITH COMPLICATION: ICD-10-CM

## 2018-07-17 RX ORDER — DIPHENOXYLATE HYDROCHLORIDE AND ATROPINE SULFATE 2.5; .025 MG/1; MG/1
1 TABLET ORAL 4 TIMES DAILY PRN
Qty: 100 TABLET | Refills: 0 | Status: SHIPPED | OUTPATIENT
Start: 2018-07-17 | End: 2018-08-15 | Stop reason: SDUPTHER

## 2018-07-17 RX ORDER — OXYCODONE AND ACETAMINOPHEN 10; 325 MG/1; MG/1
1 TABLET ORAL EVERY 6 HOURS PRN
Qty: 40 TABLET | Refills: 0 | Status: SHIPPED | OUTPATIENT
Start: 2018-07-17 | End: 2018-08-15 | Stop reason: SDUPTHER

## 2018-08-03 DIAGNOSIS — F41.9 ANXIETY: Primary | ICD-10-CM

## 2018-08-03 RX ORDER — ZOLPIDEM TARTRATE 10 MG/1
10 TABLET ORAL NIGHTLY
Qty: 30 TABLET | Refills: 1 | Status: SHIPPED | OUTPATIENT
Start: 2018-08-03 | End: 2018-10-03 | Stop reason: SDUPTHER

## 2018-08-09 ENCOUNTER — TELEPHONE (OUTPATIENT)
Dept: GASTROENTEROLOGY | Facility: CLINIC | Age: 38
End: 2018-08-09

## 2018-08-09 NOTE — TELEPHONE ENCOUNTER
----- Message from Kamryn Vela sent at 8/9/2018  2:35 PM CDT -----  Contact: Self- [phone]  Cody- pt called to speak with Oralia- has questions regarding her infusion orders- please contact pt at [phone]

## 2018-08-09 NOTE — TELEPHONE ENCOUNTER
Stated she is thinking about going to [facility] or [facility] for infusions.   Stated it is getting difficult to get an appointment here.  She will let me know if she needs new orders.

## 2018-08-13 RX ORDER — ALPRAZOLAM 1 MG/1
TABLET ORAL
Qty: 60 TABLET | Refills: 0 | Status: SHIPPED | OUTPATIENT
Start: 2018-08-13 | End: 2018-09-11 | Stop reason: SDUPTHER

## 2018-08-15 ENCOUNTER — PATIENT MESSAGE (OUTPATIENT)
Dept: GASTROENTEROLOGY | Facility: CLINIC | Age: 38
End: 2018-08-15

## 2018-08-15 DIAGNOSIS — K50.819 CROHN'S DISEASE OF SMALL AND LARGE INTESTINES WITH COMPLICATION: ICD-10-CM

## 2018-08-15 RX ORDER — OXYCODONE AND ACETAMINOPHEN 10; 325 MG/1; MG/1
1 TABLET ORAL EVERY 6 HOURS PRN
Qty: 40 TABLET | Refills: 0 | Status: SHIPPED | OUTPATIENT
Start: 2018-08-15 | End: 2018-09-17 | Stop reason: SDUPTHER

## 2018-08-15 RX ORDER — DIPHENOXYLATE HYDROCHLORIDE AND ATROPINE SULFATE 2.5; .025 MG/1; MG/1
1 TABLET ORAL 4 TIMES DAILY PRN
Qty: 100 TABLET | Refills: 0 | Status: SHIPPED | OUTPATIENT
Start: 2018-08-15 | End: 2018-09-17 | Stop reason: SDUPTHER

## 2018-08-16 ENCOUNTER — INFUSION (OUTPATIENT)
Dept: INFUSION THERAPY | Facility: OTHER | Age: 38
End: 2018-08-16
Attending: INTERNAL MEDICINE
Payer: MEDICAID

## 2018-08-16 VITALS
HEIGHT: 61 IN | DIASTOLIC BLOOD PRESSURE: 76 MMHG | HEART RATE: 84 BPM | WEIGHT: 100.5 LBS | RESPIRATION RATE: 16 BRPM | TEMPERATURE: 99 F | BODY MASS INDEX: 18.98 KG/M2 | SYSTOLIC BLOOD PRESSURE: 122 MMHG | OXYGEN SATURATION: 100 %

## 2018-08-16 DIAGNOSIS — K50.819 CROHN'S DISEASE OF SMALL AND LARGE INTESTINES WITH COMPLICATION: Primary | ICD-10-CM

## 2018-08-16 PROCEDURE — 63600175 PHARM REV CODE 636 W HCPCS: Mod: JG | Performed by: INTERNAL MEDICINE

## 2018-08-16 PROCEDURE — 36593 DECLOT VASCULAR DEVICE: CPT

## 2018-08-16 PROCEDURE — 25000003 PHARM REV CODE 250: Performed by: INTERNAL MEDICINE

## 2018-08-16 PROCEDURE — 96361 HYDRATE IV INFUSION ADD-ON: CPT

## 2018-08-16 PROCEDURE — 96360 HYDRATION IV INFUSION INIT: CPT

## 2018-08-16 RX ORDER — HEPARIN 100 UNIT/ML
500 SYRINGE INTRAVENOUS
Status: COMPLETED | OUTPATIENT
Start: 2018-08-16 | End: 2018-08-16

## 2018-08-16 RX ADMIN — SODIUM CHLORIDE 2000 ML: 0.9 INJECTION, SOLUTION INTRAVENOUS at 01:08

## 2018-08-16 RX ADMIN — HEPARIN 500 UNITS: 100 SYRINGE at 04:08

## 2018-08-16 RX ADMIN — ALTEPLASE 2 MG: 2.2 INJECTION, POWDER, LYOPHILIZED, FOR SOLUTION INTRAVENOUS at 03:08

## 2018-08-16 NOTE — PLAN OF CARE
Problem: Patient Care Overview  Goal: Plan of Care Review  Outcome: Ongoing (interventions implemented as appropriate)  Patient tolerated IVF without complaint. VSS, AVS provided.

## 2018-08-23 ENCOUNTER — INFUSION (OUTPATIENT)
Dept: INFUSION THERAPY | Facility: OTHER | Age: 38
End: 2018-08-23
Attending: INTERNAL MEDICINE
Payer: MEDICAID

## 2018-08-23 VITALS
TEMPERATURE: 98 F | DIASTOLIC BLOOD PRESSURE: 83 MMHG | HEIGHT: 61 IN | WEIGHT: 102.31 LBS | OXYGEN SATURATION: 100 % | BODY MASS INDEX: 19.32 KG/M2 | SYSTOLIC BLOOD PRESSURE: 140 MMHG | HEART RATE: 101 BPM | RESPIRATION RATE: 18 BRPM

## 2018-08-23 DIAGNOSIS — K50.819 CROHN'S DISEASE OF SMALL AND LARGE INTESTINES WITH COMPLICATION: Primary | ICD-10-CM

## 2018-08-23 PROCEDURE — 96360 HYDRATION IV INFUSION INIT: CPT

## 2018-08-23 PROCEDURE — 25000003 PHARM REV CODE 250: Performed by: INTERNAL MEDICINE

## 2018-08-23 PROCEDURE — 63600175 PHARM REV CODE 636 W HCPCS: Performed by: INTERNAL MEDICINE

## 2018-08-23 PROCEDURE — 96361 HYDRATE IV INFUSION ADD-ON: CPT

## 2018-08-23 RX ORDER — HEPARIN 100 UNIT/ML
500 SYRINGE INTRAVENOUS
Status: COMPLETED | OUTPATIENT
Start: 2018-08-23 | End: 2018-08-23

## 2018-08-23 RX ADMIN — HEPARIN 500 UNITS: 100 SYRINGE at 12:08

## 2018-08-23 RX ADMIN — SODIUM CHLORIDE 2000 ML: 0.9 INJECTION, SOLUTION INTRAVENOUS at 10:08

## 2018-08-23 NOTE — PLAN OF CARE
Problem: Patient Care Overview  Goal: Plan of Care Review  Outcome: Ongoing (interventions implemented as appropriate)  Pt tolerated 2L IVF today. VSS. AVS given.

## 2018-08-25 ENCOUNTER — PATIENT MESSAGE (OUTPATIENT)
Dept: GASTROENTEROLOGY | Facility: CLINIC | Age: 38
End: 2018-08-25

## 2018-08-27 ENCOUNTER — TELEPHONE (OUTPATIENT)
Dept: GASTROENTEROLOGY | Facility: CLINIC | Age: 38
End: 2018-08-27

## 2018-08-27 ENCOUNTER — PATIENT MESSAGE (OUTPATIENT)
Dept: GASTROENTEROLOGY | Facility: CLINIC | Age: 38
End: 2018-08-27

## 2018-08-27 DIAGNOSIS — K50.80 CROHN'S DISEASE OF BOTH SMALL AND LARGE INTESTINE WITHOUT COMPLICATION: Primary | ICD-10-CM

## 2018-08-27 NOTE — TELEPHONE ENCOUNTER
----- Message from Kate Friedman sent at 8/27/2018  9:08 AM CDT -----  Contact:  self [phone]  Pt called inquiring if Dr. Ross can schedule a lab appt for her      Contact: self [phone]

## 2018-08-28 ENCOUNTER — LAB VISIT (OUTPATIENT)
Dept: LAB | Facility: HOSPITAL | Age: 38
End: 2018-08-28
Attending: INTERNAL MEDICINE
Payer: MEDICAID

## 2018-08-28 DIAGNOSIS — K50.80 CROHN'S DISEASE OF BOTH SMALL AND LARGE INTESTINE WITHOUT COMPLICATION: ICD-10-CM

## 2018-08-28 LAB
ALBUMIN SERPL BCP-MCNC: 4 G/DL
ALP SERPL-CCNC: 94 U/L
ALT SERPL W/O P-5'-P-CCNC: 10 U/L
ANION GAP SERPL CALC-SCNC: 9 MMOL/L
AST SERPL-CCNC: 20 U/L
BASOPHILS # BLD AUTO: 0.03 K/UL
BASOPHILS NFR BLD: 0.5 %
BILIRUB SERPL-MCNC: 0.8 MG/DL
BUN SERPL-MCNC: 9 MG/DL
CALCIUM SERPL-MCNC: 9.7 MG/DL
CHLORIDE SERPL-SCNC: 106 MMOL/L
CO2 SERPL-SCNC: 23 MMOL/L
CREAT SERPL-MCNC: 0.8 MG/DL
CRP SERPL-MCNC: 2.8 MG/L
DIFFERENTIAL METHOD: NORMAL
EOSINOPHIL # BLD AUTO: 0 K/UL
EOSINOPHIL NFR BLD: 0.6 %
ERYTHROCYTE [DISTWIDTH] IN BLOOD BY AUTOMATED COUNT: 13.8 %
EST. GFR  (AFRICAN AMERICAN): >60 ML/MIN/1.73 M^2
EST. GFR  (NON AFRICAN AMERICAN): >60 ML/MIN/1.73 M^2
GLUCOSE SERPL-MCNC: 92 MG/DL
HCT VFR BLD AUTO: 43.9 %
HGB BLD-MCNC: 14.3 G/DL
IMM GRANULOCYTES # BLD AUTO: 0.01 K/UL
IMM GRANULOCYTES NFR BLD AUTO: 0.2 %
LYMPHOCYTES # BLD AUTO: 1.9 K/UL
LYMPHOCYTES NFR BLD: 29.1 %
MCH RBC QN AUTO: 29.6 PG
MCHC RBC AUTO-ENTMCNC: 32.6 G/DL
MCV RBC AUTO: 91 FL
MONOCYTES # BLD AUTO: 0.5 K/UL
MONOCYTES NFR BLD: 8.3 %
NEUTROPHILS # BLD AUTO: 3.9 K/UL
NEUTROPHILS NFR BLD: 61.3 %
NRBC BLD-RTO: 0 /100 WBC
PLATELET # BLD AUTO: 303 K/UL
PMV BLD AUTO: 10.1 FL
POTASSIUM SERPL-SCNC: 4.1 MMOL/L
PROT SERPL-MCNC: 8.5 G/DL
RBC # BLD AUTO: 4.83 M/UL
SODIUM SERPL-SCNC: 138 MMOL/L
WBC # BLD AUTO: 6.35 K/UL

## 2018-08-28 PROCEDURE — 86140 C-REACTIVE PROTEIN: CPT

## 2018-08-28 PROCEDURE — 36415 COLL VENOUS BLD VENIPUNCTURE: CPT

## 2018-08-28 PROCEDURE — 85025 COMPLETE CBC W/AUTO DIFF WBC: CPT

## 2018-08-28 PROCEDURE — 80053 COMPREHEN METABOLIC PANEL: CPT

## 2018-08-28 RX ORDER — TERCONAZOLE 4 MG/G
CREAM VAGINAL
Qty: 45 G | Refills: 0 | Status: SHIPPED | OUTPATIENT
Start: 2018-08-28 | End: 2018-12-03 | Stop reason: SDUPTHER

## 2018-08-28 RX ORDER — CLINDAMYCIN PHOSPHATE 20 MG/G
CREAM VAGINAL
Qty: 40 G | Refills: 0 | Status: SHIPPED | OUTPATIENT
Start: 2018-08-28 | End: 2018-10-31 | Stop reason: SDUPTHER

## 2018-08-30 ENCOUNTER — HOSPITAL ENCOUNTER (INPATIENT)
Facility: HOSPITAL | Age: 38
LOS: 8 days | Discharge: HOME | DRG: 387 | End: 2018-09-07
Attending: EMERGENCY MEDICINE | Admitting: HOSPITALIST
Payer: MEDICAID

## 2018-08-30 ENCOUNTER — PATIENT MESSAGE (OUTPATIENT)
Dept: GASTROENTEROLOGY | Facility: CLINIC | Age: 38
End: 2018-08-30

## 2018-08-30 ENCOUNTER — TELEPHONE (OUTPATIENT)
Dept: INTERNAL MEDICINE | Facility: CLINIC | Age: 38
End: 2018-08-30

## 2018-08-30 DIAGNOSIS — R00.0 TACHYCARDIA: ICD-10-CM

## 2018-08-30 DIAGNOSIS — K50.918: ICD-10-CM

## 2018-08-30 DIAGNOSIS — K50.018 CROHN'S DISEASE OF SMALL INTESTINE WITH OTHER COMPLICATION: Primary | ICD-10-CM

## 2018-08-30 DIAGNOSIS — Z79.899 ENCOUNTER FOR LONG-TERM (CURRENT) USE OF HIGH-RISK MEDICATION: ICD-10-CM

## 2018-08-30 DIAGNOSIS — R10.31 RIGHT LOWER QUADRANT ABDOMINAL PAIN: ICD-10-CM

## 2018-08-30 DIAGNOSIS — R00.2 PALPITATIONS: ICD-10-CM

## 2018-08-30 DIAGNOSIS — K50.819 CROHN'S DISEASE OF BOTH SMALL AND LARGE INTESTINE WITH COMPLICATION: ICD-10-CM

## 2018-08-30 DIAGNOSIS — R07.9 CHEST PAIN: ICD-10-CM

## 2018-08-30 DIAGNOSIS — K50.90 EXACERBATION OF CROHN'S DISEASE: ICD-10-CM

## 2018-08-30 PROBLEM — E87.6 HYPOKALEMIA: Status: ACTIVE | Noted: 2018-08-30

## 2018-08-30 LAB
ALBUMIN SERPL BCP-MCNC: 3.6 G/DL
ALBUMIN SERPL BCP-MCNC: 3.9 G/DL
ALP SERPL-CCNC: 79 U/L
ALP SERPL-CCNC: 87 U/L
ALT SERPL W/O P-5'-P-CCNC: 8 U/L
ALT SERPL W/O P-5'-P-CCNC: 8 U/L
ANION GAP SERPL CALC-SCNC: 11 MMOL/L
ANION GAP SERPL CALC-SCNC: 12 MMOL/L
AST SERPL-CCNC: 15 U/L
AST SERPL-CCNC: 18 U/L
BASOPHILS # BLD AUTO: 0.02 K/UL
BASOPHILS NFR BLD: 0.3 %
BILIRUB SERPL-MCNC: 0.4 MG/DL
BILIRUB SERPL-MCNC: 0.6 MG/DL
BILIRUB UR QL STRIP: NEGATIVE
BUN SERPL-MCNC: 10 MG/DL
BUN SERPL-MCNC: 6 MG/DL
CALCIUM SERPL-MCNC: 8.4 MG/DL
CALCIUM SERPL-MCNC: 9.2 MG/DL
CHLORIDE SERPL-SCNC: 109 MMOL/L
CHLORIDE SERPL-SCNC: 110 MMOL/L
CLARITY UR REFRACT.AUTO: ABNORMAL
CO2 SERPL-SCNC: 18 MMOL/L
CO2 SERPL-SCNC: 18 MMOL/L
COLOR UR AUTO: YELLOW
CREAT SERPL-MCNC: 0.7 MG/DL
CREAT SERPL-MCNC: 0.7 MG/DL
DIFFERENTIAL METHOD: ABNORMAL
EOSINOPHIL # BLD AUTO: 0 K/UL
EOSINOPHIL NFR BLD: 0.5 %
ERYTHROCYTE [DISTWIDTH] IN BLOOD BY AUTOMATED COUNT: 13.7 %
EST. GFR  (AFRICAN AMERICAN): >60 ML/MIN/1.73 M^2
EST. GFR  (AFRICAN AMERICAN): >60 ML/MIN/1.73 M^2
EST. GFR  (NON AFRICAN AMERICAN): >60 ML/MIN/1.73 M^2
EST. GFR  (NON AFRICAN AMERICAN): >60 ML/MIN/1.73 M^2
ESTIMATED AVG GLUCOSE: 91 MG/DL
GLUCOSE SERPL-MCNC: 105 MG/DL
GLUCOSE SERPL-MCNC: 86 MG/DL
GLUCOSE UR QL STRIP: NEGATIVE
HBA1C MFR BLD HPLC: 4.8 %
HCT VFR BLD AUTO: 37.7 %
HGB BLD-MCNC: 12.3 G/DL
HGB UR QL STRIP: NEGATIVE
IMM GRANULOCYTES # BLD AUTO: 0.01 K/UL
IMM GRANULOCYTES NFR BLD AUTO: 0.2 %
KETONES UR QL STRIP: ABNORMAL
LACTATE SERPL-SCNC: 1.1 MMOL/L
LEUKOCYTE ESTERASE UR QL STRIP: NEGATIVE
LYMPHOCYTES # BLD AUTO: 1.9 K/UL
LYMPHOCYTES NFR BLD: 28.9 %
MAGNESIUM SERPL-MCNC: 1.6 MG/DL
MCH RBC QN AUTO: 29.2 PG
MCHC RBC AUTO-ENTMCNC: 32.6 G/DL
MCV RBC AUTO: 90 FL
MONOCYTES # BLD AUTO: 0.6 K/UL
MONOCYTES NFR BLD: 9.5 %
NEUTROPHILS # BLD AUTO: 4 K/UL
NEUTROPHILS NFR BLD: 60.6 %
NITRITE UR QL STRIP: NEGATIVE
NRBC BLD-RTO: 0 /100 WBC
PH UR STRIP: 5 [PH] (ref 5–8)
PLATELET # BLD AUTO: 355 K/UL
PMV BLD AUTO: 9.9 FL
POTASSIUM SERPL-SCNC: 2.9 MMOL/L
POTASSIUM SERPL-SCNC: 4 MMOL/L
PROCALCITONIN SERPL IA-MCNC: 0.03 NG/ML
PROT SERPL-MCNC: 7.4 G/DL
PROT SERPL-MCNC: 8.2 G/DL
PROT UR QL STRIP: NEGATIVE
RBC # BLD AUTO: 4.21 M/UL
SODIUM SERPL-SCNC: 139 MMOL/L
SODIUM SERPL-SCNC: 139 MMOL/L
SP GR UR STRIP: 1.02 (ref 1–1.03)
TROPONIN I SERPL DL<=0.01 NG/ML-MCNC: <0.006 NG/ML
TSH SERPL DL<=0.005 MIU/L-ACNC: 1.1 UIU/ML
URN SPEC COLLECT METH UR: ABNORMAL
UROBILINOGEN UR STRIP-ACNC: NEGATIVE EU/DL
WBC # BLD AUTO: 6.53 K/UL

## 2018-08-30 PROCEDURE — 63600175 PHARM REV CODE 636 W HCPCS: Performed by: PHYSICIAN ASSISTANT

## 2018-08-30 PROCEDURE — 25000003 PHARM REV CODE 250: Performed by: EMERGENCY MEDICINE

## 2018-08-30 PROCEDURE — G0378 HOSPITAL OBSERVATION PER HR: HCPCS

## 2018-08-30 PROCEDURE — 87040 BLOOD CULTURE FOR BACTERIA: CPT

## 2018-08-30 PROCEDURE — 80053 COMPREHEN METABOLIC PANEL: CPT | Mod: 91

## 2018-08-30 PROCEDURE — 99285 EMERGENCY DEPT VISIT HI MDM: CPT | Mod: ,,, | Performed by: EMERGENCY MEDICINE

## 2018-08-30 PROCEDURE — 25000003 PHARM REV CODE 250: Performed by: HOSPITALIST

## 2018-08-30 PROCEDURE — 83036 HEMOGLOBIN GLYCOSYLATED A1C: CPT

## 2018-08-30 PROCEDURE — 83605 ASSAY OF LACTIC ACID: CPT

## 2018-08-30 PROCEDURE — 84484 ASSAY OF TROPONIN QUANT: CPT

## 2018-08-30 PROCEDURE — 83735 ASSAY OF MAGNESIUM: CPT

## 2018-08-30 PROCEDURE — 36415 COLL VENOUS BLD VENIPUNCTURE: CPT

## 2018-08-30 PROCEDURE — 93010 ELECTROCARDIOGRAM REPORT: CPT | Mod: ,,, | Performed by: INTERNAL MEDICINE

## 2018-08-30 PROCEDURE — 25000003 PHARM REV CODE 250: Performed by: PHYSICIAN ASSISTANT

## 2018-08-30 PROCEDURE — 99220 PR INITIAL OBSERVATION CARE,LEVL III: CPT | Mod: ,,, | Performed by: PHYSICIAN ASSISTANT

## 2018-08-30 PROCEDURE — 96366 THER/PROPH/DIAG IV INF ADDON: CPT

## 2018-08-30 PROCEDURE — 99285 EMERGENCY DEPT VISIT HI MDM: CPT | Mod: 25

## 2018-08-30 PROCEDURE — 80053 COMPREHEN METABOLIC PANEL: CPT

## 2018-08-30 PROCEDURE — 96365 THER/PROPH/DIAG IV INF INIT: CPT

## 2018-08-30 PROCEDURE — 84145 PROCALCITONIN (PCT): CPT

## 2018-08-30 PROCEDURE — 11000001 HC ACUTE MED/SURG PRIVATE ROOM

## 2018-08-30 PROCEDURE — 96361 HYDRATE IV INFUSION ADD-ON: CPT

## 2018-08-30 PROCEDURE — 93005 ELECTROCARDIOGRAM TRACING: CPT

## 2018-08-30 PROCEDURE — 81003 URINALYSIS AUTO W/O SCOPE: CPT

## 2018-08-30 PROCEDURE — 85025 COMPLETE CBC W/AUTO DIFF WBC: CPT

## 2018-08-30 PROCEDURE — 84443 ASSAY THYROID STIM HORMONE: CPT

## 2018-08-30 PROCEDURE — 63600175 PHARM REV CODE 636 W HCPCS: Performed by: EMERGENCY MEDICINE

## 2018-08-30 RX ORDER — IBUPROFEN 200 MG
24 TABLET ORAL
Status: DISCONTINUED | OUTPATIENT
Start: 2018-08-30 | End: 2018-09-07 | Stop reason: HOSPADM

## 2018-08-30 RX ORDER — GLUCAGON 1 MG
1 KIT INJECTION
Status: DISCONTINUED | OUTPATIENT
Start: 2018-08-30 | End: 2018-09-07 | Stop reason: HOSPADM

## 2018-08-30 RX ORDER — POTASSIUM CHLORIDE 7.45 MG/ML
10 INJECTION INTRAVENOUS
Status: COMPLETED | OUTPATIENT
Start: 2018-08-30 | End: 2018-08-30

## 2018-08-30 RX ORDER — ZOLPIDEM TARTRATE 5 MG/1
10 TABLET ORAL NIGHTLY PRN
Status: DISCONTINUED | OUTPATIENT
Start: 2018-08-30 | End: 2018-09-07 | Stop reason: HOSPADM

## 2018-08-30 RX ORDER — PROMETHAZINE HYDROCHLORIDE 25 MG/1
25 TABLET ORAL EVERY 4 HOURS PRN
Status: DISCONTINUED | OUTPATIENT
Start: 2018-08-30 | End: 2018-09-07 | Stop reason: HOSPADM

## 2018-08-30 RX ORDER — DRONABINOL 2.5 MG/1
2.5 CAPSULE ORAL
Status: DISCONTINUED | OUTPATIENT
Start: 2018-08-30 | End: 2018-09-04

## 2018-08-30 RX ORDER — ACETAMINOPHEN 325 MG/1
650 TABLET ORAL EVERY 8 HOURS PRN
Status: DISCONTINUED | OUTPATIENT
Start: 2018-08-30 | End: 2018-09-07 | Stop reason: HOSPADM

## 2018-08-30 RX ORDER — SODIUM CHLORIDE 9 MG/ML
INJECTION, SOLUTION INTRAVENOUS CONTINUOUS
Status: DISCONTINUED | OUTPATIENT
Start: 2018-08-30 | End: 2018-09-04

## 2018-08-30 RX ORDER — VALACYCLOVIR HYDROCHLORIDE 500 MG/1
500 TABLET, FILM COATED ORAL DAILY
Status: DISCONTINUED | OUTPATIENT
Start: 2018-08-31 | End: 2018-09-07 | Stop reason: HOSPADM

## 2018-08-30 RX ORDER — DIPHENOXYLATE HYDROCHLORIDE AND ATROPINE SULFATE 2.5; .025 MG/1; MG/1
1 TABLET ORAL 4 TIMES DAILY PRN
Status: DISCONTINUED | OUTPATIENT
Start: 2018-08-30 | End: 2018-08-30

## 2018-08-30 RX ORDER — ALPRAZOLAM 1 MG/1
1 TABLET ORAL 2 TIMES DAILY PRN
Status: DISCONTINUED | OUTPATIENT
Start: 2018-08-30 | End: 2018-09-04

## 2018-08-30 RX ORDER — LOPERAMIDE HYDROCHLORIDE 2 MG/1
2 CAPSULE ORAL DAILY PRN
Status: DISCONTINUED | OUTPATIENT
Start: 2018-08-30 | End: 2018-08-30

## 2018-08-30 RX ORDER — POTASSIUM CHLORIDE 20 MEQ/1
40 TABLET, EXTENDED RELEASE ORAL
Status: COMPLETED | OUTPATIENT
Start: 2018-08-30 | End: 2018-08-30

## 2018-08-30 RX ORDER — LOPERAMIDE HYDROCHLORIDE 2 MG/1
2 CAPSULE ORAL DAILY
Status: DISCONTINUED | OUTPATIENT
Start: 2018-08-31 | End: 2018-09-06

## 2018-08-30 RX ORDER — DIPHENOXYLATE HYDROCHLORIDE AND ATROPINE SULFATE 2.5; .025 MG/1; MG/1
1 TABLET ORAL 4 TIMES DAILY
Status: DISCONTINUED | OUTPATIENT
Start: 2018-08-30 | End: 2018-09-07 | Stop reason: HOSPADM

## 2018-08-30 RX ORDER — SODIUM CHLORIDE 0.9 % (FLUSH) 0.9 %
5 SYRINGE (ML) INJECTION
Status: DISCONTINUED | OUTPATIENT
Start: 2018-08-30 | End: 2018-09-07 | Stop reason: HOSPADM

## 2018-08-30 RX ORDER — IBUPROFEN 200 MG
16 TABLET ORAL
Status: DISCONTINUED | OUTPATIENT
Start: 2018-08-30 | End: 2018-09-07 | Stop reason: HOSPADM

## 2018-08-30 RX ORDER — PROMETHAZINE HYDROCHLORIDE 12.5 MG/1
25 TABLET ORAL EVERY 6 HOURS PRN
Status: DISCONTINUED | OUTPATIENT
Start: 2018-08-30 | End: 2018-08-30

## 2018-08-30 RX ORDER — OXYCODONE AND ACETAMINOPHEN 10; 325 MG/1; MG/1
1 TABLET ORAL EVERY 6 HOURS PRN
Status: DISCONTINUED | OUTPATIENT
Start: 2018-08-30 | End: 2018-09-06

## 2018-08-30 RX ADMIN — SODIUM CHLORIDE 1374 ML: 0.9 INJECTION, SOLUTION INTRAVENOUS at 09:08

## 2018-08-30 RX ADMIN — SODIUM CHLORIDE: 0.9 INJECTION, SOLUTION INTRAVENOUS at 10:08

## 2018-08-30 RX ADMIN — ALPRAZOLAM 1 MG: 1 TABLET ORAL at 08:08

## 2018-08-30 RX ADMIN — POTASSIUM CHLORIDE 40 MEQ: 1500 TABLET, EXTENDED RELEASE ORAL at 10:08

## 2018-08-30 RX ADMIN — SODIUM CHLORIDE 1000 ML: 0.9 INJECTION, SOLUTION INTRAVENOUS at 09:08

## 2018-08-30 RX ADMIN — OXYCODONE HYDROCHLORIDE AND ACETAMINOPHEN 1 TABLET: 10; 325 TABLET ORAL at 06:08

## 2018-08-30 RX ADMIN — POTASSIUM CHLORIDE 10 MEQ: 10 INJECTION, SOLUTION INTRAVENOUS at 11:08

## 2018-08-30 RX ADMIN — DIPHENOXYLATE HYDROCHLORIDE AND ATROPINE SULFATE 1 TABLET: 2.5; .025 TABLET ORAL at 08:08

## 2018-08-30 RX ADMIN — PROMETHAZINE HYDROCHLORIDE 25 MG: 12.5 TABLET ORAL at 03:08

## 2018-08-30 RX ADMIN — DRONABINOL 2.5 MG: 2.5 CAPSULE ORAL at 06:08

## 2018-08-30 RX ADMIN — ZOLPIDEM TARTRATE 10 MG: 5 TABLET ORAL at 10:08

## 2018-08-30 NOTE — TELEPHONE ENCOUNTER
Pt advised that she was on her way to ED([facility]) for racing heart rate and disoriented and confused. CF

## 2018-08-30 NOTE — TELEPHONE ENCOUNTER
----- Message from [place] sent at 8/30/2018  7:11 AM CDT -----  Contact: PHANI RODRIGUEZ [[id number]]        Name of Who is Calling:  PHANI RODRIGUEZ [[id number]]      What is the request in detail:   Patient called requesting to discuss what's going with her. I did offer an appointment to be seen but because of the patient's insurance I have nothing available anytime soon.  Please reach out to this patient at your earliest convenience via [facility] or a phone call.   THANKS!      Can the clinic reply by [facility]: Yes      What Number to Call Back:  PHANI RODRIGUEZ / # [phone]

## 2018-08-30 NOTE — ED PROVIDER NOTES
Encounter Date: 8/30/2018    SCRIBE #1 NOTE: I, Ghazala Ryan am scribing for, and in the presence of, Dr. Chavez .       History     Chief Complaint   Patient presents with    Palpitations     i can't remember about 20 min of time last night at 9 pm     Time patient was seen by the provider: 8:54 AM      The patient is a 38 y.o. female with medical conditions including: HTN, anxiety, Crohn's dz, s/p TAC and ileostomy in 2012, and extensive hx of GI surgeries who presents to the ED with a complaint of developing palpitations and CP yesterday. 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 She states she was driving to [employer] to see her boyfriend when she developed palpitations, right arm paraesthesias, CP, and urinary urgency. She continued to drive and believes she lost track of time and became disoriented because she arrived at the wrong hotel, a location she is otherwise familiar with. She has a hx of anxiety and took Xanax yesterday which help her relax. She woke up today feeling better and drove to [facility] for evaluation. She had a similar presentation on 02/04/2018 and was seen here at the time, and one month prior at [facility]. She denies any recent medication changes, fever, dysuria or urinary symptoms today, or rash, but endorses myalgias and mild diarrhea. She has no cardiac hx.         The history is provided by the patient and medical records.     Review of patient's allergies indicates:   Allergen Reactions    Vancomycin analogues Hives    Azathioprine sodium      Other reaction(s): pancreatitis  Other reaction(s): pancreatitis    Methotrexate analogues      Past Medical History:   Diagnosis Date    Abnormal Pap smear 2007    Abnormal Pap smear 5/26/2011    Anemia     Anxiety     Arthritis     C. difficile diarrhea     Crohn's disease     Depression 8/5/2017    Encounter for blood transfusion     Genital HSV      History of colposcopy with cervical biopsy 2007 and 7/2011 2007-LYLA I  and 7/2011- LYLA I    Hypertension     Kidney stone     Kidney stone     Recurrent UTI 4/3/2013    S/P ileostomy 7/9/2012    Sterilization 6/23/2012     Past Surgical History:   Procedure Laterality Date    ABDOMINAL SURGERY      APPENDECTOMY      BLADDER SURGERY      partial cystectomy due to fistula    CKC      COLON SURGERY      COLONOSCOPY      HYSTERECTOMY  04/16/2015    SHELLIE    ILEOSTOMY      OOPHORECTOMY Right 04/16/2015    PORTACATH PLACEMENT      SKIN BIOPSY      SMALL INTESTINE SURGERY      TOTAL COLECTOMY      TUBAL LIGATION  06 06 2012    UPPER GASTROINTESTINAL ENDOSCOPY       Family History   Problem Relation Age of Onset    Colon cancer Father     Cancer Father         colon cancer    Hypertension Mother     Cancer Maternal Grandfather         esopghal     Skin cancer Maternal Grandfather     Endometrial cancer Maternal Aunt     Crohn's disease Brother     Breast cancer Neg Hx     Ovarian cancer Neg Hx      Social History     Tobacco Use    Smoking status: Never Smoker    Smokeless tobacco: Never Used   Substance Use Topics    Alcohol use: Yes     Alcohol/week: 0.0 oz     Comment: Patient only drinks wine not regular basis.    Drug use: No     Review of Systems   Constitutional: Negative for diaphoresis.   HENT: Negative for nosebleeds.    Eyes: Negative for discharge.   Respiratory: Negative for shortness of breath and wheezing.    Cardiovascular: Positive for chest pain and palpitations.   Gastrointestinal: Positive for diarrhea (mild). Negative for vomiting.   Genitourinary: Positive for urgency. Negative for difficulty urinating, dysuria, frequency and hematuria.   Musculoskeletal: Positive for myalgias. Negative for neck pain and neck stiffness.   Skin: Negative for rash.   Neurological: Negative for seizures.        Positive for right arm paraesthesias.    Psychiatric/Behavioral: Positive for  confusion (resolved).       Physical Exam     Initial Vitals [08/30/18 0849]   BP Pulse Resp Temp SpO2   (!) 160/97 (!) 143 18 98.9 °F (37.2 °C) 100 %      MAP       --         Physical Exam    Nursing note and vitals reviewed.  Constitutional:   Pt appears anxious and flushed but in no distress.   HENT:   Head: Normocephalic and atraumatic.   Mouth/Throat: Mucous membranes are normal.   Eyes: Conjunctivae are normal.   No conjunctival pallor.   Neck: Neck supple.   Cardiovascular: Regular rhythm. Tachycardia present.    Pulmonary/Chest: Breath sounds normal. No respiratory distress.   Abdominal: Soft. She exhibits no distension. There is no tenderness.   She has a colonostomy bag in her right lower abdominal wall.   Neurological:   Answers question appropriately, no confusion appreciated.    Skin:   Facial flushing, otherwise no rashes appreciated.         ED Course   Procedures  Labs Reviewed   CBC W/ AUTO DIFFERENTIAL - Abnormal; Notable for the following components:       Result Value    Platelets 355 (*)     All other components within normal limits   COMPREHENSIVE METABOLIC PANEL - Abnormal; Notable for the following components:    Potassium 2.9 (*)     CO2 18 (*)     Calcium 8.4 (*)     ALT 8 (*)     All other components within normal limits   URINALYSIS, REFLEX TO URINE CULTURE - Abnormal; Notable for the following components:    Appearance, UA Hazy (*)     Ketones, UA Trace (*)     All other components within normal limits    Narrative:     Preferred Collection Type->Urine, Clean Catch   MAGNESIUM   PROCALCITONIN   LACTIC ACID, PLASMA   POCT GLUCOSE MONITORING CONTINUOUS     EKG Readings: (Independently Interpreted)   Sinus tachycardia with prolonged QT, ST depressions in inferior leads on her initial EKG. Repeat EKG with ST depression largely resolved but prolonged QT.       Imaging Results          CT Head Without Contrast (Final result)  Result time 08/30/18 12:24:59    Final result by Kailash Moya MD  (08/30/18 12:24:59)                 Impression:      Unremarkable noncontrast head CT.      Electronically signed by: Kailash Moya MD  Date:    08/30/2018  Time:    12:24             Narrative:    EXAMINATION:  CT HEAD WITHOUT CONTRAST    CLINICAL HISTORY:  confusion;    TECHNIQUE:  Low dose axial CT images obtained throughout the head without intravenous contrast. Sagittal and coronal reconstructions were performed.    COMPARISON:  None.    FINDINGS:  Intracranial compartment:    Ventricles and sulci are normal in size for age without evidence of hydrocephalus. No extra-axial blood or fluid collections.    The brain parenchyma appears normal. No parenchymal mass, hemorrhage, edema or major vascular distribution infarct.    Skull/extracranial contents (limited evaluation): No fracture. Mastoid air cells and paranasal sinuses are essentially clear.                               X-Ray Chest AP Portable (Final result)  Result time 08/30/18 10:03:04    Final result by Gavino Baca MD (08/30/18 10:03:04)                 Impression:      No significant intrathoracic abnormality.  No significant detrimental interval change in the appearance of the chest since 02/02/2018 is appreciated.      Electronically signed by: Gavino Baca MD  Date:    08/30/2018  Time:    10:03             Narrative:    EXAMINATION:  XR CHEST AP PORTABLE    CLINICAL HISTORY:  Sepsis;    COMPARISON:  Comparison is made to 02/02/2018.    FINDINGS:  Vascular catheter again noted, entering from a left subclavian approach and having its tip in the superior vena cava.  Heart size is normal, as is the appearance of the pulmonary vascularity.  Lung zones appear stable and clear, free of significant airspace consolidation or volume loss.  No pleural fluid.  No hilar or mediastinal mass lesion.  No pneumothorax.                                 Medical Decision Making:   History:   Old Medical Records: I decided to obtain old medical records.  Initial  Assessment:   37 yo f, h/o crohns w ostomy, anxiety, HTN, here with palpitations x 1 day, episode confusion last night.  No infectious sx.  Significant tachycardia on arrival with prolonged QT interval.  Pt flushed, anxious appearing, nontoxic.    Pt with similar previous episodes.  During 1 episode found to have urosepsis.  Other episodes w benign work-up.  CTA negative for PE in Jan, Holter Monitor study in April negative for SVT  Differential Diagnosis:   Dehydration vs sepsis vs anxiety  Independently Interpreted Test(s):   I have ordered and independently interpreted EKG Reading(s) - see prior notes  Clinical Tests:   Lab Tests: Ordered and Reviewed  Radiological Study: Ordered and Reviewed  Medical Tests: Ordered and Reviewed  ED Management:  Labs  IVF  CXR/CT head    12:34 PM  K low 2.8 and with QT prolongation and EKG changes, given IV and PO K replacement  No signs infection on labs (WBC normal, procal normal) so will hold off on antibiotics  VS/HR have improved  Other:   I have discussed this case with another health care provider.            Scribe Attestation:   Scribe #1: I performed the above scribed service and the documentation accurately describes the services I performed. I attest to the accuracy of the note.               Clinical Impression:   The encounter diagnosis was Palpitations.         I, Dr. Shelby Chavez, personally performed the services described in this documentation. All medical record entries made by the scribe were at my direction and in my presence.  I have reviewed the chart and agree that the record reflects my personal performance and is accurate and complete. Shelby Chavez MD.  12:35 PM 09/01/2018                        Shelby Chavez MD  09/01/18 0934

## 2018-08-30 NOTE — SUBJECTIVE & OBJECTIVE
Past Medical History:   Diagnosis Date    Abnormal Pap smear 2007    Abnormal Pap smear 5/26/2011    Anemia     Anxiety     Arthritis     C. difficile diarrhea     Crohn's disease     Depression 8/5/2017    Encounter for blood transfusion     Genital HSV     History of colposcopy with cervical biopsy 2007 and 7/2011 2007-LYLA I  and 7/2011- LYLA I    Hypertension     Kidney stone     Kidney stone     Recurrent UTI 4/3/2013    S/P ileostomy 7/9/2012    Sterilization 6/23/2012       Past Surgical History:   Procedure Laterality Date    ABDOMINAL SURGERY      APPENDECTOMY      BLADDER SURGERY      partial cystectomy due to fistula    CKC      COLON SURGERY      COLONOSCOPY      HYSTERECTOMY  04/16/2015    SHELLIE    ILEOSTOMY      OOPHORECTOMY Right 04/16/2015    PORTACATH PLACEMENT      SKIN BIOPSY      SMALL INTESTINE SURGERY      TOTAL COLECTOMY      TUBAL LIGATION  06 06 2012    UPPER GASTROINTESTINAL ENDOSCOPY         Review of patient's allergies indicates:   Allergen Reactions    Vancomycin analogues Hives    Azathioprine sodium      Other reaction(s): pancreatitis  Other reaction(s): pancreatitis    Methotrexate analogues        No current facility-administered medications on file prior to encounter.      Current Outpatient Medications on File Prior to Encounter   Medication Sig    ALPRAZolam (XANAX) 1 MG tablet TAKE 1 TABLET BY MOUTH TWICE DAILY AS NEEDED FOR ANXIETY    biotin 1,000 mcg Chew Take 1 tablet by mouth once daily.    clindamycin (CLINDESSE) 2 % vaginal cream PLACE VAGINALLY EVERY EVENING    diphenoxylate-atropine 2.5-0.025 mg (LOMOTIL) 2.5-0.025 mg per tablet Take 1 tablet by mouth 4 (four) times daily as needed for Diarrhea.    dronabinol (MARINOL) 2.5 MG capsule Take 1 capsule (2.5 mg total) by mouth 2 (two) times daily before meals.    ergocalciferol (ERGOCALCIFEROL) 50,000 unit Cap Take 1 capsule (50,000 Units total) by mouth every 7 days.    loperamide  (IMODIUM) 2 mg capsule Take 2 mg by mouth daily as needed for Diarrhea.    magnesium 250 mg Tab Take by mouth once.    multivitamin (ONE DAILY MULTIVITAMIN) per tablet Take 1 tablet by mouth once daily.    norethindrone-ethinyl estradiol (JUNEL FE 1/20) 1 mg-20 mcg (21)/75 mg (7) per tablet Take 1 tablet by mouth once daily.    oxyCODONE-acetaminophen (PERCOCET)  mg per tablet Take 1 tablet by mouth every 6 (six) hours as needed for Pain.    potassium citrate 15 mEq TbSR Take 2 tablets by mouth 2 (two) times daily.    promethazine (PHENERGAN) 25 MG tablet Take 1 tablet (25 mg total) by mouth every 6 (six) hours as needed.    sumatriptan (IMITREX) 100 MG tablet Take 1 tablet (100 mg total) by mouth once.    terconazole (TERAZOL 7) 0.4 % Crea INSERT ONE APPLICATORFUL VAGINALLY AT BEDTIME    valACYclovir (VALTREX) 500 MG tablet Take 1 tablet (500 mg total) by mouth once daily.    zolpidem (AMBIEN) 10 mg Tab Take 1 tablet (10 mg total) by mouth every evening.     Family History     Problem Relation (Age of Onset)    Cancer Father, Maternal Grandfather    Colon cancer Father    Crohn's disease Brother    Endometrial cancer Maternal Aunt    Hypertension Mother    Skin cancer Maternal Grandfather        Tobacco Use    Smoking status: Never Smoker    Smokeless tobacco: Never Used   Substance and Sexual Activity    Alcohol use: Yes     Alcohol/week: 0.0 oz     Comment: Patient only drinks wine not regular basis.    Drug use: No    Sexual activity: Yes     Partners: Male     Birth control/protection: Pill, Surgical, Condom     Comment: HYST     Review of Systems   Constitutional: Positive for activity change, appetite change and diaphoresis. Negative for chills and fever.   HENT: Negative for sore throat and trouble swallowing.    Eyes: Positive for visual disturbance. Negative for photophobia.   Respiratory: Positive for shortness of breath. Negative for cough and wheezing.    Cardiovascular: Positive  for chest pain and palpitations.   Gastrointestinal: Positive for abdominal pain, diarrhea and nausea. Negative for abdominal distention, blood in stool, constipation and vomiting.   Genitourinary: Negative for dysuria, frequency and hematuria.   Musculoskeletal: Positive for myalgias. Negative for neck pain and neck stiffness.   Skin: Negative for color change and pallor.   Neurological: Positive for dizziness, light-headedness and headaches. Negative for syncope, weakness and numbness.   Psychiatric/Behavioral: Positive for confusion. Negative for behavioral problems.     Objective:     Vital Signs (Most Recent):  Temp: 99 °F (37.2 °C) (08/30/18 1557)  Pulse: 63 (08/30/18 1619)  Resp: 16 (08/30/18 1557)  BP: (!) 140/86 (08/30/18 1557)  SpO2: (!) 94 % (08/30/18 1557) Vital Signs (24h Range):  Temp:  [98.9 °F (37.2 °C)-99 °F (37.2 °C)] 99 °F (37.2 °C)  Pulse:  [] 63  Resp:  [16-39] 16  SpO2:  [94 %-100 %] 94 %  BP: (116-160)/(73-97) 140/86     Weight: 48.1 kg (106 lb 1.6 oz)  Body mass index is 20.05 kg/m².    Physical Exam   Constitutional: She is oriented to person, place, and time. She appears well-developed and well-nourished. No distress.   HENT:   Head: Normocephalic and atraumatic.   Eyes: EOM are normal.   Neck: Normal range of motion. Neck supple.   Cardiovascular: Normal rate, regular rhythm, normal heart sounds and intact distal pulses.   Pulmonary/Chest: Effort normal and breath sounds normal.   Abdominal: Soft. Normal appearance. Bowel sounds are decreased. There is no tenderness. There is no rigidity, no rebound and no guarding.   Ostomy in place    Musculoskeletal: Normal range of motion.   Neurological: She is alert and oriented to person, place, and time.   Skin: Skin is warm and dry. She is not diaphoretic.   Psychiatric: She has a normal mood and affect. Her behavior is normal.   Nursing note and vitals reviewed.        CRANIAL NERVES     CN III, IV, VI   Extraocular motions are normal.         Significant Labs: All pertinent labs within the past 24 hours have been reviewed.    Significant Imaging: I have reviewed all pertinent imaging results/findings within the past 24 hours.

## 2018-08-30 NOTE — HPI
Ivy Salgado is a 37yo WF with a PMH of Crohn's disease, anxiety, and HTN presents to the ED with complaints of CP, palpitations, and increased ostomy output. She reports one episode of burning CP last night that lasted roughly 20 minutes and occurred at rest. CP resolved on its own but was associated with diaphoresis, palpitations, dizziness, and SOB. During this episode of CP she reports a HA and L sided tingling in her hand, foot, and face that resolved with the CP. She does endorse some confusion and memory loss during this episode. She states she pulled over when the CP originally began but then decided to keep driving to her boyfriend's place in [place]. However, she does not remember the rest of the drive and arrived to the wrong hotel which she has been going to for years.     She also had another episode of palpitations this morning which prompted her to come to the ED but denies any associated CP. She has also been experiencing increased ostomy output, cramping abdominal pain, and heartburn for the last 2 weeks. She believes her current symptoms are similar to her crohn's flares in the past. She has not been on maintenance therapy for her crohn's since Feb/March due to side effects. She is currently only receiving symptomatic treatment and gets 2L NS IV once a week (Thursdays) to prevent dehydration.      ED: CBC, UA WNL. CMP revealed K+ of 2.9 replaced IV and PO. CXR and CTH unremarkable.

## 2018-08-30 NOTE — ED NOTES
LOC: The patient is awake, alert and aware of environment with an appropriate affect, the patient is oriented x 3 and speaking appropriately.  APPEARANCE: Patient resting comfortably and clean and well groomed.  SKIN: The skin is warm and dry, patient has normal skin turgor and moist mucus membranes, skin intact, no breakdown or brusing noted.  MUSKULOSKELETAL: Patient moving all extremities well, no obvious swelling or deformities noted.  RESPIRATORY: Airway is open and patent, respirations are spontaneous, patient has a normal effort and rate. Breath sounds are clear and equal bilaterally.  CARDIAC: Normal heart sounds. No peripheral edema. C/O palpitations with heart rate increasing to 115.  ABDOMEN: Soft and non tender to palpation, no distention noted. Bowel sounds present. Colostomy clean and intact.

## 2018-08-30 NOTE — PLAN OF CARE
Problem: Patient Care Overview  Goal: Plan of Care Review  Outcome: Ongoing (interventions implemented as appropriate)  Pt's VSS, NAD noted. Pt is sinus rhythm on cardiac monitor. Bed locked in lowest position, side rails upx2, call bell within reach, nonskid socks on pt. Pt c/o generalized 6/10 abdominal pain. Manish BHATT aware and stated she would place home medication orders after seeing the pt. Pt ambulates independently with even gait. Pt aware of Regular diet order.

## 2018-08-31 ENCOUNTER — PATIENT MESSAGE (OUTPATIENT)
Dept: INTERNAL MEDICINE | Facility: CLINIC | Age: 38
End: 2018-08-31

## 2018-08-31 LAB
ALBUMIN SERPL BCP-MCNC: 3.1 G/DL
ALP SERPL-CCNC: 74 U/L
ALT SERPL W/O P-5'-P-CCNC: 6 U/L
ANION GAP SERPL CALC-SCNC: 6 MMOL/L
AST SERPL-CCNC: 14 U/L
BASOPHILS # BLD AUTO: 0.02 K/UL
BASOPHILS NFR BLD: 0.5 %
BILIRUB SERPL-MCNC: 0.5 MG/DL
BUN SERPL-MCNC: 6 MG/DL
CALCIUM SERPL-MCNC: 8.7 MG/DL
CHLORIDE SERPL-SCNC: 112 MMOL/L
CO2 SERPL-SCNC: 23 MMOL/L
CREAT SERPL-MCNC: 0.7 MG/DL
DIASTOLIC DYSFUNCTION: NO
DIFFERENTIAL METHOD: ABNORMAL
EOSINOPHIL # BLD AUTO: 0.1 K/UL
EOSINOPHIL NFR BLD: 1.5 %
ERYTHROCYTE [DISTWIDTH] IN BLOOD BY AUTOMATED COUNT: 13.8 %
EST. GFR  (AFRICAN AMERICAN): >60 ML/MIN/1.73 M^2
EST. GFR  (NON AFRICAN AMERICAN): >60 ML/MIN/1.73 M^2
ESTIMATED PA SYSTOLIC PRESSURE: 21.66
GLUCOSE SERPL-MCNC: 96 MG/DL
HCT VFR BLD AUTO: 33.8 %
HGB BLD-MCNC: 11 G/DL
IMM GRANULOCYTES # BLD AUTO: 0.01 K/UL
IMM GRANULOCYTES NFR BLD AUTO: 0.2 %
LYMPHOCYTES # BLD AUTO: 1.7 K/UL
LYMPHOCYTES NFR BLD: 42.7 %
MAGNESIUM SERPL-MCNC: 1.9 MG/DL
MCH RBC QN AUTO: 29.7 PG
MCHC RBC AUTO-ENTMCNC: 32.5 G/DL
MCV RBC AUTO: 91 FL
MITRAL VALVE REGURGITATION: NORMAL
MONOCYTES # BLD AUTO: 0.5 K/UL
MONOCYTES NFR BLD: 13.2 %
NEUTROPHILS # BLD AUTO: 1.7 K/UL
NEUTROPHILS NFR BLD: 41.9 %
NRBC BLD-RTO: 0 /100 WBC
PHOSPHATE SERPL-MCNC: 4 MG/DL
PLATELET # BLD AUTO: 266 K/UL
PMV BLD AUTO: 9.6 FL
POTASSIUM SERPL-SCNC: 3.5 MMOL/L
PROT SERPL-MCNC: 6.4 G/DL
RBC # BLD AUTO: 3.7 M/UL
RETIRED EF AND QEF - SEE NOTES: 60 (ref 55–65)
SODIUM SERPL-SCNC: 141 MMOL/L
TRICUSPID VALVE REGURGITATION: NORMAL
WBC # BLD AUTO: 4.03 K/UL

## 2018-08-31 PROCEDURE — 25500020 PHARM REV CODE 255: Performed by: HOSPITALIST

## 2018-08-31 PROCEDURE — 63600175 PHARM REV CODE 636 W HCPCS: Performed by: PHYSICIAN ASSISTANT

## 2018-08-31 PROCEDURE — 93306 TTE W/DOPPLER COMPLETE: CPT

## 2018-08-31 PROCEDURE — 84260 ASSAY OF SEROTONIN: CPT

## 2018-08-31 PROCEDURE — 25000003 PHARM REV CODE 250: Performed by: HOSPITALIST

## 2018-08-31 PROCEDURE — 93306 TTE W/DOPPLER COMPLETE: CPT | Mod: 26,,, | Performed by: INTERNAL MEDICINE

## 2018-08-31 PROCEDURE — 80053 COMPREHEN METABOLIC PANEL: CPT

## 2018-08-31 PROCEDURE — 99223 1ST HOSP IP/OBS HIGH 75: CPT | Mod: ,,, | Performed by: PHYSICIAN ASSISTANT

## 2018-08-31 PROCEDURE — A9585 GADOBUTROL INJECTION: HCPCS | Performed by: HOSPITALIST

## 2018-08-31 PROCEDURE — 83735 ASSAY OF MAGNESIUM: CPT

## 2018-08-31 PROCEDURE — 85025 COMPLETE CBC W/AUTO DIFF WBC: CPT

## 2018-08-31 PROCEDURE — 84100 ASSAY OF PHOSPHORUS: CPT

## 2018-08-31 PROCEDURE — 11000001 HC ACUTE MED/SURG PRIVATE ROOM

## 2018-08-31 PROCEDURE — 25000003 PHARM REV CODE 250: Performed by: PHYSICIAN ASSISTANT

## 2018-08-31 PROCEDURE — 99214 OFFICE O/P EST MOD 30 MIN: CPT | Mod: ,,, | Performed by: INTERNAL MEDICINE

## 2018-08-31 RX ORDER — BUDESONIDE 3 MG/1
9 CAPSULE, COATED PELLETS ORAL DAILY
Status: DISCONTINUED | OUTPATIENT
Start: 2018-08-31 | End: 2018-09-07 | Stop reason: HOSPADM

## 2018-08-31 RX ORDER — LORAZEPAM 2 MG/ML
1 INJECTION INTRAMUSCULAR ONCE
Status: DISCONTINUED | OUTPATIENT
Start: 2018-08-31 | End: 2018-09-01

## 2018-08-31 RX ORDER — GADOBUTROL 604.72 MG/ML
10 INJECTION INTRAVENOUS
Status: COMPLETED | OUTPATIENT
Start: 2018-08-31 | End: 2018-08-31

## 2018-08-31 RX ORDER — LORAZEPAM 1 MG/1
1 TABLET ORAL ONCE
Status: DISCONTINUED | OUTPATIENT
Start: 2018-08-31 | End: 2018-08-31

## 2018-08-31 RX ORDER — DIAZEPAM 5 MG/1
5 TABLET ORAL ONCE
Status: COMPLETED | OUTPATIENT
Start: 2018-08-31 | End: 2018-08-31

## 2018-08-31 RX ADMIN — ZOLPIDEM TARTRATE 10 MG: 5 TABLET ORAL at 10:08

## 2018-08-31 RX ADMIN — VALACYCLOVIR 500 MG: 500 TABLET, FILM COATED ORAL at 09:08

## 2018-08-31 RX ADMIN — DRONABINOL 2.5 MG: 2.5 CAPSULE ORAL at 09:08

## 2018-08-31 RX ADMIN — OXYCODONE HYDROCHLORIDE AND ACETAMINOPHEN 1 TABLET: 10; 325 TABLET ORAL at 06:08

## 2018-08-31 RX ADMIN — SODIUM CHLORIDE: 0.9 INJECTION, SOLUTION INTRAVENOUS at 04:08

## 2018-08-31 RX ADMIN — SODIUM CHLORIDE: 0.9 INJECTION, SOLUTION INTRAVENOUS at 07:08

## 2018-08-31 RX ADMIN — DIPHENOXYLATE HYDROCHLORIDE AND ATROPINE SULFATE 1 TABLET: 2.5; .025 TABLET ORAL at 07:08

## 2018-08-31 RX ADMIN — PROMETHAZINE HYDROCHLORIDE 25 MG: 12.5 TABLET ORAL at 05:08

## 2018-08-31 RX ADMIN — DIPHENOXYLATE HYDROCHLORIDE AND ATROPINE SULFATE 1 TABLET: 2.5; .025 TABLET ORAL at 10:08

## 2018-08-31 RX ADMIN — PROMETHAZINE HYDROCHLORIDE 25 MG: 12.5 TABLET ORAL at 10:08

## 2018-08-31 RX ADMIN — GADOBUTROL 10 ML: 604.72 INJECTION INTRAVENOUS at 07:08

## 2018-08-31 RX ADMIN — LOPERAMIDE HYDROCHLORIDE 2 MG: 2 CAPSULE ORAL at 09:08

## 2018-08-31 RX ADMIN — DRONABINOL 2.5 MG: 2.5 CAPSULE ORAL at 03:08

## 2018-08-31 RX ADMIN — BUDESONIDE 9 MG: 3 CAPSULE, GELATIN COATED ORAL at 03:08

## 2018-08-31 RX ADMIN — DIAZEPAM 5 MG: 5 TABLET ORAL at 04:08

## 2018-08-31 RX ADMIN — OXYCODONE HYDROCHLORIDE AND ACETAMINOPHEN 1 TABLET: 10; 325 TABLET ORAL at 02:08

## 2018-08-31 RX ADMIN — OXYCODONE HYDROCHLORIDE AND ACETAMINOPHEN 1 TABLET: 10; 325 TABLET ORAL at 08:08

## 2018-08-31 RX ADMIN — DIPHENOXYLATE HYDROCHLORIDE AND ATROPINE SULFATE 1 TABLET: 2.5; .025 TABLET ORAL at 12:08

## 2018-08-31 RX ADMIN — ALPRAZOLAM 1 MG: 1 TABLET ORAL at 10:08

## 2018-08-31 NOTE — ASSESSMENT & PLAN NOTE
Abdominal pain  She believes her current symptoms are similar to her crohn's flares in the past. She has not been on maintenance therapy for her crohn's since Feb/March due to side effects.     - GI consulted, appreciate recs  - diphenoxylate-atropine 2.5-0.025 mg (LOMOTIL) 2.5-0.025 mg per tablet  - loperamide (IMODIUM) 2 mg capsule  - appears non-toxic, afebrile without leukocytosis  - stool studies completed 8/28   - C. Diff, Shigella, stool culture unremarkable  - promethazine (PHENERGAN) 25 MG tablet PRN   - monitor for signs of infection

## 2018-08-31 NOTE — PLAN OF CARE
08/31/18 0925   Discharge Assessment   Assessment Type Discharge Planning Assessment   Confirmed/corrected address and phone number on facesheet? Yes   Assessment information obtained from? Patient   Expected Length of Stay (days) 2   Communicated expected length of stay with patient/caregiver yes   Prior to hospitilization cognitive status: Alert/Oriented   Prior to hospitalization functional status: Independent   Current cognitive status: Alert/Oriented   Current Functional Status: Independent   Lives With parent(s);child(katalina), dependent  (parents & dep child (11y.o))   Able to Return to Prior Arrangements yes   Is patient able to care for self after discharge? Yes   Patient's perception of discharge disposition home or selfcare   Readmission Within The Last 30 Days no previous admission in last 30 days   Patient currently being followed by outpatient case management? No   Patient currently receives any other outside agency services? No   Equipment Currently Used at Home colostomy/ostomy supplies   Do you have any problems affording any of your prescribed medications? No   Is the patient taking medications as prescribed? yes   Does the patient have transportation home? Yes   Transportation Available family or friend will provide  (mother, Shaila Salgado ([phone]))   Does the patient receive services at the [facility]? No   Discharge Plan A Home with family   Discharge Plan B Home Health   Patient/Family In Agreement With Plan yes     Dx: palpitations  Consult: GI  Pharm: [facility]  Hosp f/u appt: Message sent to Dr. Kalia Astorga's (PCP) nurse requesting a hospital follow up appointment in 1-2 weeks. Dr. Astorga's nurse to call the patient with appointment date & time. Message sent to Dr. Parish Ross's (GI) nurse requesting a hospital follow up appointment in 1-2 weeks. Dr. Ross's nurse to call the patient with appointment date & time.

## 2018-08-31 NOTE — ASSESSMENT & PLAN NOTE
Chest burning/discomfort  38 y.o. to who presents with burning chest pain with radiation into neck, reports associated dizziness and lightheadedness. Symptoms lasted for ~20 mins. Pain is not reproducible on exam.      - EKG with sinus tachycardia, 119 BPM  -  troponin 0.006, will trend Q6H  - CXR demonstrating lung zones that appear stable and clear  - 2D ECHO ordered for further evaluation   - CBC, CMP (goal Mag>2, K>4) daily; lipid panel ordered  - recent work-up for palpitations with 48 holter-monitor, 4/10/2018  - 30 day event monitor revealed normal sinus rhythm and sinus tachycardia (up to 129 bpm with exercise). There were no significant arrhythmias noted.  - cardiac telemetry

## 2018-08-31 NOTE — ASSESSMENT & PLAN NOTE
Abdominal pain  She believes her current symptoms are similar to her crohn's flares in the past. She has not been on maintenance therapy for her crohn's since Feb/March due to side effects.     - GI consulted, appreciate recs   - budesonide 9mg daily   - MR enterography    - loperamide (IMODIUM) 2 mg capsule  - appears non-toxic, afebrile without leukocytosis  - stool studies completed 8/28   - C. Diff, Shigella, stool culture unremarkable  - promethazine (PHENERGAN) 25 MG tablet PRN   - monitor for signs of infection

## 2018-08-31 NOTE — PROGRESS NOTES
[facility] Hospital Medicine  Progress Note    Patient Name: Ivy Salgado  MRN: [id number]  Patient Class: IP- Inpatient   Admission Date: 8/30/2018  Length of Stay: 0 days  Attending Physician: Candice Chamberlain MD  Primary Care Provider: Kalia Astorga MD    [facility] Medicine Team: [facility] Manish Leung PA-C    Subjective:     Principal Problem:Palpitations    HPI:  Ivy Salgado is a 39yo WF with a PMH of Crohn's disease, anxiety, and HTN presents to the ED with complaints of CP, palpitations, and increased ostomy output. She reports one episode of burning CP last night that lasted roughly 20 minutes and occurred at rest. CP resolved on its own but was associated with diaphoresis, palpitations, dizziness, and SOB. During this episode of CP she reports a HA and L sided tingling in her hand, foot, and face that resolved with the CP. She does endorse some confusion and memory loss during this episode. She states she pulled over when the CP originally began but then decided to keep driving to her boyfriend's place in [place]. However, she does not remember the rest of the drive and arrived to the wrong hotel which she has been going to for years.     She also had another episode of palpitations this morning which prompted her to come to the ED but denies any associated CP. She has also been experiencing increased ostomy output, cramping abdominal pain, and heartburn for the last 2 weeks. She believes her current symptoms are similar to her crohn's flares in the past. She has not been on maintenance therapy for her crohn's since Feb/March due to side effects. She is currently only receiving symptomatic treatment and gets 2L NS IV once a week (Thursdays) to prevent dehydration.      ED: CBC, UA WNL. CMP revealed K+ of 2.9 replaced IV and PO. CXR and CTH unremarkable.    Hospital Course:  Ivy Salgado was admitted due to symptoms believed to be associated with Crohn's flare. 2D ECHO,  EKG and troponin's were wnl. Gastroenterology was consulted and recommended budesonide 9mg daily and MR enterography.     Interval History: Output with minimal improvement this AM. Reports another episode of palpitations when ambulating to restroom this morning. Telemetry without events or record of tachycardia.     Review of Systems   Constitutional: Positive for activity change, appetite change and diaphoresis. Negative for chills and fever.   HENT: Negative for sore throat and trouble swallowing.    Eyes: Positive for visual disturbance. Negative for photophobia.   Respiratory: Positive for shortness of breath. Negative for cough and wheezing.    Cardiovascular: Positive for chest pain and palpitations.   Gastrointestinal: Positive for abdominal pain, diarrhea and nausea. Negative for abdominal distention, blood in stool, constipation and vomiting.   Genitourinary: Negative for dysuria, frequency and hematuria.   Musculoskeletal: Positive for myalgias. Negative for neck pain and neck stiffness.   Skin: Negative for color change and pallor.   Neurological: Positive for dizziness, light-headedness and headaches. Negative for syncope, weakness and numbness.   Psychiatric/Behavioral: Positive for confusion. Negative for behavioral problems.     Objective:     Vital Signs (Most Recent):  Temp: 97.2 °F (36.2 °C) (08/31/18 1700)  Pulse: 101 (08/31/18 1700)  Resp: 16 (08/31/18 1700)  BP: (!) 175/81 (08/31/18 1700)  SpO2: 100 % (08/31/18 1700) Vital Signs (24h Range):  Temp:  [97.2 °F (36.2 °C)-99 °F (37.2 °C)] 97.2 °F (36.2 °C)  Pulse:  [] 101  Resp:  [16] 16  SpO2:  [96 %-100 %] 100 %  BP: (108-175)/(71-96) 175/81     Weight: 48.1 kg (106 lb 1.6 oz)  Body mass index is 20.05 kg/m².    Intake/Output Summary (Last 24 hours) at 8/31/2018 1712  Last data filed at 8/31/2018 1400  Gross per 24 hour   Intake 1050 ml   Output 850 ml   Net 200 ml      Physical Exam   Constitutional: She is oriented to person, place, and  time. She appears well-developed and well-nourished. No distress.   HENT:   Head: Normocephalic and atraumatic.   Eyes: EOM are normal.   Neck: Normal range of motion. Neck supple.   Cardiovascular: Normal rate, regular rhythm, normal heart sounds and intact distal pulses.   Pulmonary/Chest: Effort normal and breath sounds normal.   Abdominal: Soft. Normal appearance. Bowel sounds are decreased. There is no tenderness. There is no rigidity, no rebound and no guarding.   Ostomy in place    Musculoskeletal: Normal range of motion.   Neurological: She is alert and oriented to person, place, and time.   Skin: Skin is warm and dry. She is not diaphoretic.   Psychiatric: She has a normal mood and affect. Her behavior is normal.   Nursing note and vitals reviewed.      Significant Labs: All pertinent labs within the past 24 hours have been reviewed.    Significant Imaging: I have reviewed all pertinent imaging results/findings within the past 24 hours.    Assessment/Plan:      * Palpitations    Chest burning/discomfort  38 y.o. to who presents with burning chest pain with radiation into neck, reports associated dizziness and lightheadedness. Symptoms lasted for ~20 mins. Pain is not reproducible on exam.      - EKG with sinus tachycardia, 119 BPM  -  troponin 0.006, will trend Q6H  - CXR demonstrating lung zones that appear stable and clear  - 2D ECHO with EF 60-65%  - CBC, CMP (goal Mag>2, K>4) daily  - recent work-up for palpitations with 48 holter-monitor, 4/10/2018  - 30 day event monitor revealed normal sinus rhythm and sinus tachycardia (up to 129 bpm with exercise). There were no significant arrhythmias noted.  - cardiac telemetry        Crohn's disease    Abdominal pain  She believes her current symptoms are similar to her crohn's flares in the past. She has not been on maintenance therapy for her crohn's since Feb/March due to side effects.     - GI consulted, appreciate recs   - budesonide 9mg daily   -   enterography    - loperamide (IMODIUM) 2 mg capsule  - appears non-toxic, afebrile without leukocytosis  - stool studies completed 8/28   - C. Diff, Shigella, stool culture unremarkable  - promethazine (PHENERGAN) 25 MG tablet PRN   - monitor for signs of infection        Hypokalemia    Improved, increased output likely secondary   - continue to monitor daily         Essential hypertension, benign              Herpes genitalis in women    - valACYclovir (VALTREX) 500 MG tablet         Generalized anxiety disorder    Last taken yesterday   - ALPRAZolam (XANAX) 1 MG tablet BID PRN           VTE Risk Mitigation (From admission, onward)        Ordered     IP VTE LOW RISK PATIENT  Once      08/30/18 1332     Place sequential compression device  Until discontinued      08/30/18 1332     IP VTE LOW RISK PATIENT  Once      08/30/18 1332              Manish Leung PA-C  Department of Hospital Medicine   [facility]

## 2018-08-31 NOTE — PLAN OF CARE
Problem: Patient Care Overview  Goal: Plan of Care Review  Outcome: Ongoing (interventions implemented as appropriate)  Pt progressing towards goals.ivf in progress.iliostomy putting out a bright green liquid stool.pt independent with ostomy care.remains on telemetry,NSR.denies chest pain.safety precautions maintained.pt free of falls or injuries.continue plan of care.

## 2018-08-31 NOTE — ASSESSMENT & PLAN NOTE
Chest burning/discomfort  38 y.o. to who presents with burning chest pain with radiation into neck, reports associated dizziness and lightheadedness. Symptoms lasted for ~20 mins. Pain is not reproducible on exam.      - EKG with sinus tachycardia, 119 BPM  -  troponin 0.006, will trend Q6H  - CXR demonstrating lung zones that appear stable and clear  - 2D ECHO with EF 60-65%  - CBC, CMP (goal Mag>2, K>4) daily  - recent work-up for palpitations with 48 holter-monitor, 4/10/2018  - 30 day event monitor revealed normal sinus rhythm and sinus tachycardia (up to 129 bpm with exercise). There were no significant arrhythmias noted.  - cardiac telemetry

## 2018-08-31 NOTE — PROGRESS NOTES
Manish BHATT aware pt is requesting Ativan for MRI d/t claustrophobia. PA stated she would place order.

## 2018-08-31 NOTE — PROGRESS NOTES
Called and spoke with PA discussed khoa's history and prior medication administration for MRI in pass / will give meds as ordered and waiting on order to be placed

## 2018-08-31 NOTE — PROGRESS NOTES
Discuss plan of care with Ivy and her mother in MRI prep area / pt very well known to me and will help pt get through her anxiety of MRI and contrast reactions / pt reports she is feeling better knowing nurse administering medication

## 2018-08-31 NOTE — PLAN OF CARE
Problem: Patient Care Overview  Goal: Plan of Care Review  Outcome: Ongoing (interventions implemented as appropriate)  Pt's VSS, NAD noted. Pt is sinus rhythm on cardiac monitor. Bed locked in lowest position, side rails upx2, call bell within reach, nonskid socks on pt. Pt refused scds, but ambulates independently with even gait. IVF still infusing at 100ml/hr. Pt states nausea relief with PRN phenergan. Pt had 2d echo done this morning and GI consult was completed.

## 2018-08-31 NOTE — PROGRESS NOTES
Pt left unit for MRI with transport tech. VSS. Pt is sinus rhythm to sinus tachycardic on cardiac monitor.

## 2018-08-31 NOTE — ASSESSMENT & PLAN NOTE
Ms Salgado is a 38 year old woman with history of chron's disease s/p total colectomy with end ileostomy, HTN, anxiety, who is presentign to the hospital with chest pain and palpitations; GI consulted due to concern for IBD flare.    She is presenting with 1 month of worsening abdominal pain, nausea, no vomitus, increased ileostomy output similar to prior flares.    CRP 2.8 (3 days ago), negative shiga toxin, negative c.diff, blood cultures NGTD, UA negative. CMP with hypokalemia of 2.9 likely secondary to increased ileostomy output.    Imp: concern for IBD flare    Plan  - start budesonide 9mg daily. Recommend breaking capsule and mixing into apple sauce to ensure adequate small bowel absorption  - please obtain MR enterography  - will plan to start entyvio as outpatient  - plan discussed with primary team

## 2018-08-31 NOTE — TELEPHONE ENCOUNTER
Because of patient insurance in it okay for me to over ride hospital f/u for September next opening isn't until 2019

## 2018-08-31 NOTE — SUBJECTIVE & OBJECTIVE
Interval History: Output with minimal improvement this AM. Reports another episode of palpitations when ambulating to restroom this morning. Telemetry without events or record of tachycardia.     Review of Systems   Constitutional: Positive for activity change, appetite change and diaphoresis. Negative for chills and fever.   HENT: Negative for sore throat and trouble swallowing.    Eyes: Positive for visual disturbance. Negative for photophobia.   Respiratory: Positive for shortness of breath. Negative for cough and wheezing.    Cardiovascular: Positive for chest pain and palpitations.   Gastrointestinal: Positive for abdominal pain, diarrhea and nausea. Negative for abdominal distention, blood in stool, constipation and vomiting.   Genitourinary: Negative for dysuria, frequency and hematuria.   Musculoskeletal: Positive for myalgias. Negative for neck pain and neck stiffness.   Skin: Negative for color change and pallor.   Neurological: Positive for dizziness, light-headedness and headaches. Negative for syncope, weakness and numbness.   Psychiatric/Behavioral: Positive for confusion. Negative for behavioral problems.     Objective:     Vital Signs (Most Recent):  Temp: 97.2 °F (36.2 °C) (08/31/18 1700)  Pulse: 101 (08/31/18 1700)  Resp: 16 (08/31/18 1700)  BP: (!) 175/81 (08/31/18 1700)  SpO2: 100 % (08/31/18 1700) Vital Signs (24h Range):  Temp:  [97.2 °F (36.2 °C)-99 °F (37.2 °C)] 97.2 °F (36.2 °C)  Pulse:  [] 101  Resp:  [16] 16  SpO2:  [96 %-100 %] 100 %  BP: (108-175)/(71-96) 175/81     Weight: 48.1 kg (106 lb 1.6 oz)  Body mass index is 20.05 kg/m².    Intake/Output Summary (Last 24 hours) at 8/31/2018 1712  Last data filed at 8/31/2018 1400  Gross per 24 hour   Intake 1050 ml   Output 850 ml   Net 200 ml      Physical Exam   Constitutional: She is oriented to person, place, and time. She appears well-developed and well-nourished. No distress.   HENT:   Head: Normocephalic and atraumatic.   Eyes: EOM  are normal.   Neck: Normal range of motion. Neck supple.   Cardiovascular: Normal rate, regular rhythm, normal heart sounds and intact distal pulses.   Pulmonary/Chest: Effort normal and breath sounds normal.   Abdominal: Soft. Normal appearance. Bowel sounds are decreased. There is no tenderness. There is no rigidity, no rebound and no guarding.   Ostomy in place    Musculoskeletal: Normal range of motion.   Neurological: She is alert and oriented to person, place, and time.   Skin: Skin is warm and dry. She is not diaphoretic.   Psychiatric: She has a normal mood and affect. Her behavior is normal.   Nursing note and vitals reviewed.      Significant Labs: All pertinent labs within the past 24 hours have been reviewed.    Significant Imaging: I have reviewed all pertinent imaging results/findings within the past 24 hours.

## 2018-08-31 NOTE — SUBJECTIVE & OBJECTIVE
Past Medical History:   Diagnosis Date    Abnormal Pap smear 2007    Abnormal Pap smear 5/26/2011    Anemia     Anxiety     Arthritis     C. difficile diarrhea     Crohn's disease     Depression 8/5/2017    Encounter for blood transfusion     Genital HSV     History of colposcopy with cervical biopsy 2007 and 7/2011 2007-LYLA I  and 7/2011- LYLA I    Hypertension     Kidney stone     Kidney stone     Recurrent UTI 4/3/2013    S/P ileostomy 7/9/2012    Sterilization 6/23/2012       Past Surgical History:   Procedure Laterality Date    ABDOMINAL SURGERY      APPENDECTOMY      BLADDER SURGERY      partial cystectomy due to fistula    CKC      COLON SURGERY      COLONOSCOPY      HYSTERECTOMY  04/16/2015    SHELLIE    ILEOSTOMY      OOPHORECTOMY Right 04/16/2015    PORTACATH PLACEMENT      SKIN BIOPSY      SMALL INTESTINE SURGERY      TOTAL COLECTOMY      TUBAL LIGATION  06 06 2012    UPPER GASTROINTESTINAL ENDOSCOPY         Review of patient's allergies indicates:   Allergen Reactions    Vancomycin analogues Hives    Azathioprine sodium      Other reaction(s): pancreatitis  Other reaction(s): pancreatitis    Methotrexate analogues      Family History     Problem Relation (Age of Onset)    Cancer Father, Maternal Grandfather    Colon cancer Father    Crohn's disease Brother    Endometrial cancer Maternal Aunt    Hypertension Mother    Skin cancer Maternal Grandfather        Tobacco Use    Smoking status: Never Smoker    Smokeless tobacco: Never Used   Substance and Sexual Activity    Alcohol use: Yes     Alcohol/week: 0.0 oz     Comment: Patient only drinks wine not regular basis.    Drug use: No    Sexual activity: Yes     Partners: Male     Birth control/protection: Pill, Surgical, Condom     Comment: HY     Review of Systems   Constitutional: Positive for appetite change, diaphoresis (night sweats), fatigue and unexpected weight change (10 lb/1 week). Negative for activity  change, chills and fever.   HENT: Negative for sore throat.    Eyes: Negative for visual disturbance.   Respiratory: Negative for cough and shortness of breath.    Cardiovascular: Negative for chest pain.   Gastrointestinal: Positive for abdominal pain, diarrhea and nausea (  ). Negative for abdominal distention, anal bleeding, constipation and vomiting.   Genitourinary: Negative for dysuria.   Musculoskeletal: Positive for arthralgias. Negative for myalgias.   Skin: Negative for rash.   Neurological: Negative for dizziness and headaches.   Psychiatric/Behavioral: Negative for agitation and confusion.     Objective:     Vital Signs (Most Recent):  Temp: 99 °F (37.2 °C) (08/31/18 1216)  Pulse: 102 (08/31/18 1222)  Resp: 16 (08/31/18 1216)  BP: (!) 136/96 (08/31/18 1222)  SpO2: 100 % (08/31/18 1216) Vital Signs (24h Range):  Temp:  [97.6 °F (36.4 °C)-99 °F (37.2 °C)] 99 °F (37.2 °C)  Pulse:  [] 102  Resp:  [16-18] 16  SpO2:  [94 %-100 %] 100 %  BP: (108-143)/(71-96) 136/96     Weight: 48.1 kg (106 lb 1.6 oz) (08/30/18 1602)  Body mass index is 20.05 kg/m².      Intake/Output Summary (Last 24 hours) at 8/31/2018 1238  Last data filed at 8/31/2018 0656  Gross per 24 hour   Intake 1050 ml   Output 600 ml   Net 450 ml       Lines/Drains/Airways     Central Venous Catheter Line                 Port A Cath Single Lumen 02/16/18 0853 left subclavian 196 days          Drain                 Ileostomy 06/16/12 0000 RLQ 2267 days                Physical Exam   Constitutional: She is oriented to person, place, and time. She appears well-developed and well-nourished. No distress.   Young female, pleasant, no distress. Moving around bed without issue.   HENT:   Head: Normocephalic and atraumatic.   Eyes: No scleral icterus.   Cardiovascular: Normal rate, regular rhythm and normal heart sounds.   Pulmonary/Chest: Effort normal and breath sounds normal. No respiratory distress.   Abdominal: Soft. Bowel sounds are normal. She  exhibits no distension. There is tenderness (mild tenderness mid-eigastric). There is no rebound and no guarding.   Ostomy to RLQ   Musculoskeletal: She exhibits no tenderness.   Lymphadenopathy:     She has no cervical adenopathy.   Neurological: She is alert and oriented to person, place, and time.   Skin: Skin is warm.   Psychiatric: She has a normal mood and affect. Her behavior is normal. Judgment and thought content normal.   Nursing note and vitals reviewed.      Significant Labs:  CBC:   Recent Labs   Lab  08/30/18   0918  08/31/18   0400   WBC  6.53  4.03   HGB  12.3  11.0*   HCT  37.7  33.8*   PLT  355*  266     CMP:   Recent Labs   Lab  08/31/18   0400   GLU  96   CALCIUM  8.7   ALBUMIN  3.1*   PROT  6.4   NA  141   K  3.5   CO2  23   CL  112*   BUN  6   CREATININE  0.7   ALKPHOS  74   ALT  6*   AST  14   BILITOT  0.5     Coagulation: No results for input(s): PT, INR, APTT in the last 48 hours.    Significant Imaging:  Imaging results within the past 24 hours have been reviewed.

## 2018-08-31 NOTE — HOSPITAL COURSE
Ivy Salgado was admitted due to symptoms believed to be associated with Crohn's flare. 2D ECHO, EKG and troponin's were wnl. Gastroenterology was consulted and recommended budesonide 9mg daily and MR enterography, results pending. EP consulted for persistent palpitations, recommended PRN beta blocker. Pt with continued hyperadrenergic spells of unknown origin. Cardiac MRI unremarkable. Patient started on Metoprolol (Lopressor) daily (as resting HR >90 and pt with episodes of sinus tachycardia). HR responded. Patient stablef or discharge with 30 day event monitor.

## 2018-08-31 NOTE — HPI
Ms Salgado is a 38 year old woman with history of chron's disease s/p total colectomy with end ileostomy, HTN, anxiety, who is presentign to the hospital with chest pain and palpitations; GI consulted due to concern for IBD flare.    She was last seen in GI clinic with Dr. Ross (1/2018) at which point she was doing well and due to complications of stelera she was discontinued and planned for repeat MRI and ileoscopy in ~6 months. She was discontinued and placed on prednisone. Potential plan was to transition to entio and consideration of surgery for stricture.    She notes that she has been doing unwell for the past 1 month which have significantly worsened over the preceeding 2 weeks. Her symptoms are primarily abdominal pain (she endorses chronic abdominal discomfort which is well managed on intermittent percocet and localized to near stoma) and endorses new pain in the LUQ. Due to cramping and poor appetite she has decreased her PO intake and is currently taking ~no intake - has been presenting ~weekly to [facility] for IV hydration.    On further review she endorses 1 week of night sweats and joint pains. Since hydrated her palpitations and chest pain have resolved (some palpitation with ambulation). Otherwise review of systems negative.      Regarding her IBD history, Ms Salgado follows with Dr. Ross in the clinic.    Prior IBD medications: [not chronological]  - imuran: discontinued due to pancreatitis  - Methotrexate: discontinued due to leukopenia  - Remicade (years): lost effect  - Humira: SLE-like development  - Cimzia (10 yr duration): stopped due to active disease while on therapy  - Stelera (most recently): no response. stopped 1/2018 due to concern for complications and no active CD (facial rash, singles, recurrent UTI including ESBL, pyelonephritis and nephrolithiasis).    Prior IBD Surgeries:  - total colectomy and end-ileostomy (2012)

## 2018-08-31 NOTE — H&P
[facility] Hospital Medicine  History & Physical    Patient Name: Ivy Salgado  MRN: [id number]  Admission Date: 8/30/2018  Attending Physician: Candice Chamberlain MD   Primary Care Provider: Kalia Astorga MD    [facility] Medicine Team: [facility] HOSP MED E Manish Leung PA-C     Patient information was obtained from patient, past medical records and ER records.     Subjective:     Principal Problem:Palpitations    Chief Complaint:   Chief Complaint   Patient presents with    Palpitations     i can't remember about 20 min of time last night at 9 pm        HPI: Ivy Salgado is a 39yo WF with a PMH of Crohn's disease, anxiety, and HTN presents to the ED with complaints of CP, palpitations, and increased ostomy output. She reports one episode of burning CP last night that lasted roughly 20 minutes and occurred at rest. CP resolved on its own but was associated with diaphoresis, palpitations, dizziness, and SOB. During this episode of CP she reports a HA and L sided tingling in her hand, foot, and face that resolved with the CP. She does endorse some confusion and memory loss during this episode. She states she pulled over when the CP originally began but then decided to keep driving to her boyfriend's place in [place]. However, she does not remember the rest of the drive and arrived to the wrong hotel which she has been going to for years.     She also had another episode of palpitations this morning which prompted her to come to the ED but denies any associated CP. She has also been experiencing increased ostomy output, cramping abdominal pain, and heartburn for the last 2 weeks. She believes her current symptoms are similar to her crohn's flares in the past. She has not been on maintenance therapy for her crohn's since Feb/March due to side effects. She is currently only receiving symptomatic treatment and gets 2L NS IV once a week (Thursdays) to prevent dehydration.      ED: CBC, UA WNL. CMP  revealed K+ of 2.9 replaced IV and PO. CXR and CTH unremarkable.    Past Medical History:   Diagnosis Date    Abnormal Pap smear 2007    Abnormal Pap smear 5/26/2011    Anemia     Anxiety     Arthritis     C. difficile diarrhea     Crohn's disease     Depression 8/5/2017    Encounter for blood transfusion     Genital HSV     History of colposcopy with cervical biopsy 2007 and 7/2011 2007-LYLA I  and 7/2011- LYLA I    Hypertension     Kidney stone     Kidney stone     Recurrent UTI 4/3/2013    S/P ileostomy 7/9/2012    Sterilization 6/23/2012       Past Surgical History:   Procedure Laterality Date    ABDOMINAL SURGERY      APPENDECTOMY      BLADDER SURGERY      partial cystectomy due to fistula    CKC      COLON SURGERY      COLONOSCOPY      HYSTERECTOMY  04/16/2015    SHELLIE    ILEOSTOMY      OOPHORECTOMY Right 04/16/2015    PORTACATH PLACEMENT      SKIN BIOPSY      SMALL INTESTINE SURGERY      TOTAL COLECTOMY      TUBAL LIGATION  06 06 2012    UPPER GASTROINTESTINAL ENDOSCOPY         Review of patient's allergies indicates:   Allergen Reactions    Vancomycin analogues Hives    Azathioprine sodium      Other reaction(s): pancreatitis  Other reaction(s): pancreatitis    Methotrexate analogues        No current facility-administered medications on file prior to encounter.      Current Outpatient Medications on File Prior to Encounter   Medication Sig    ALPRAZolam (XANAX) 1 MG tablet TAKE 1 TABLET BY MOUTH TWICE DAILY AS NEEDED FOR ANXIETY    biotin 1,000 mcg Chew Take 1 tablet by mouth once daily.    clindamycin (CLINDESSE) 2 % vaginal cream PLACE VAGINALLY EVERY EVENING    diphenoxylate-atropine 2.5-0.025 mg (LOMOTIL) 2.5-0.025 mg per tablet Take 1 tablet by mouth 4 (four) times daily as needed for Diarrhea.    dronabinol (MARINOL) 2.5 MG capsule Take 1 capsule (2.5 mg total) by mouth 2 (two) times daily before meals.    ergocalciferol (ERGOCALCIFEROL) 50,000 unit Cap Take 1  capsule (50,000 Units total) by mouth every 7 days.    loperamide (IMODIUM) 2 mg capsule Take 2 mg by mouth daily as needed for Diarrhea.    magnesium 250 mg Tab Take by mouth once.    multivitamin (ONE DAILY MULTIVITAMIN) per tablet Take 1 tablet by mouth once daily.    norethindrone-ethinyl estradiol (JUNEL FE 1/20) 1 mg-20 mcg (21)/75 mg (7) per tablet Take 1 tablet by mouth once daily.    oxyCODONE-acetaminophen (PERCOCET)  mg per tablet Take 1 tablet by mouth every 6 (six) hours as needed for Pain.    potassium citrate 15 mEq TbSR Take 2 tablets by mouth 2 (two) times daily.    promethazine (PHENERGAN) 25 MG tablet Take 1 tablet (25 mg total) by mouth every 6 (six) hours as needed.    sumatriptan (IMITREX) 100 MG tablet Take 1 tablet (100 mg total) by mouth once.    terconazole (TERAZOL 7) 0.4 % Crea INSERT ONE APPLICATORFUL VAGINALLY AT BEDTIME    valACYclovir (VALTREX) 500 MG tablet Take 1 tablet (500 mg total) by mouth once daily.    zolpidem (AMBIEN) 10 mg Tab Take 1 tablet (10 mg total) by mouth every evening.     Family History     Problem Relation (Age of Onset)    Cancer Father, Maternal Grandfather    Colon cancer Father    Crohn's disease Brother    Endometrial cancer Maternal Aunt    Hypertension Mother    Skin cancer Maternal Grandfather        Tobacco Use    Smoking status: Never Smoker    Smokeless tobacco: Never Used   Substance and Sexual Activity    Alcohol use: Yes     Alcohol/week: 0.0 oz     Comment: Patient only drinks wine not regular basis.    Drug use: No    Sexual activity: Yes     Partners: Male     Birth control/protection: Pill, Surgical, Condom     Comment: HYST     Review of Systems   Constitutional: Positive for activity change, appetite change and diaphoresis. Negative for chills and fever.   HENT: Negative for sore throat and trouble swallowing.    Eyes: Positive for visual disturbance. Negative for photophobia.   Respiratory: Positive for shortness of  breath. Negative for cough and wheezing.    Cardiovascular: Positive for chest pain and palpitations.   Gastrointestinal: Positive for abdominal pain, diarrhea and nausea. Negative for abdominal distention, blood in stool, constipation and vomiting.   Genitourinary: Negative for dysuria, frequency and hematuria.   Musculoskeletal: Positive for myalgias. Negative for neck pain and neck stiffness.   Skin: Negative for color change and pallor.   Neurological: Positive for dizziness, light-headedness and headaches. Negative for syncope, weakness and numbness.   Psychiatric/Behavioral: Positive for confusion. Negative for behavioral problems.     Objective:     Vital Signs (Most Recent):  Temp: 99 °F (37.2 °C) (08/30/18 1557)  Pulse: 63 (08/30/18 1619)  Resp: 16 (08/30/18 1557)  BP: (!) 140/86 (08/30/18 1557)  SpO2: (!) 94 % (08/30/18 1557) Vital Signs (24h Range):  Temp:  [98.9 °F (37.2 °C)-99 °F (37.2 °C)] 99 °F (37.2 °C)  Pulse:  [] 63  Resp:  [16-39] 16  SpO2:  [94 %-100 %] 94 %  BP: (116-160)/(73-97) 140/86     Weight: 48.1 kg (106 lb 1.6 oz)  Body mass index is 20.05 kg/m².    Physical Exam   Constitutional: She is oriented to person, place, and time. She appears well-developed and well-nourished. No distress.   HENT:   Head: Normocephalic and atraumatic.   Eyes: EOM are normal.   Neck: Normal range of motion. Neck supple.   Cardiovascular: Normal rate, regular rhythm, normal heart sounds and intact distal pulses.   Pulmonary/Chest: Effort normal and breath sounds normal.   Abdominal: Soft. Normal appearance. Bowel sounds are decreased. There is no tenderness. There is no rigidity, no rebound and no guarding.   Ostomy in place    Musculoskeletal: Normal range of motion.   Neurological: She is alert and oriented to person, place, and time.   Skin: Skin is warm and dry. She is not diaphoretic.   Psychiatric: She has a normal mood and affect. Her behavior is normal.   Nursing note and vitals  reviewed.        CRANIAL NERVES     CN III, IV, VI   Extraocular motions are normal.        Significant Labs: All pertinent labs within the past 24 hours have been reviewed.    Significant Imaging: I have reviewed all pertinent imaging results/findings within the past 24 hours.    Assessment/Plan:     * Palpitations    Chest burning/discomfort  38 y.o. to who presents with burning chest pain with radiation into neck, reports associated dizziness and lightheadedness. Symptoms lasted for ~20 mins. Pain is not reproducible on exam.      - EKG with sinus tachycardia, 119 BPM  -  troponin 0.006, will trend Q6H  - CXR demonstrating lung zones that appear stable and clear  - 2D ECHO ordered for further evaluation   - CBC, CMP (goal Mag>2, K>4) daily; lipid panel ordered  - recent work-up for palpitations with 48 holter-monitor, 4/10/2018  - 30 day event monitor revealed normal sinus rhythm and sinus tachycardia (up to 129 bpm with exercise). There were no significant arrhythmias noted.  - cardiac telemetry        Crohn's disease    Abdominal pain  She believes her current symptoms are similar to her crohn's flares in the past. She has not been on maintenance therapy for her crohn's since Feb/March due to side effects.     - GI consulted, appreciate recs  - diphenoxylate-atropine 2.5-0.025 mg (LOMOTIL) 2.5-0.025 mg per tablet  - loperamide (IMODIUM) 2 mg capsule  - appears non-toxic, afebrile without leukocytosis  - stool studies completed 8/28   - C. Diff, Shigella, stool culture unremarkable  - promethazine (PHENERGAN) 25 MG tablet PRN   - monitor for signs of infection        Hypokalemia    2.9 on admission   - repleted in the ED  - repeat 4.0        Essential hypertension, benign              Herpes genitalis in women    - valACYclovir (VALTREX) 500 MG tablet         Generalized anxiety disorder    Last taken yesterday   - ALPRAZolam (XANAX) 1 MG tablet BID PRN           VTE Risk Mitigation (From admission, onward)         Ordered     IP VTE LOW RISK PATIENT  Once      08/30/18 1332     Place sequential compression device  Until discontinued      08/30/18 1332     IP VTE LOW RISK PATIENT  Once      08/30/18 1332             Manish Leung PA-C  Department of Hospital Medicine   [facility]

## 2018-09-01 PROBLEM — I47.10 PAROXYSMAL SVT (SUPRAVENTRICULAR TACHYCARDIA): Status: ACTIVE | Noted: 2018-09-01

## 2018-09-01 LAB
ALBUMIN SERPL BCP-MCNC: 3.2 G/DL
ALP SERPL-CCNC: 79 U/L
ALT SERPL W/O P-5'-P-CCNC: 10 U/L
ANION GAP SERPL CALC-SCNC: 7 MMOL/L
AST SERPL-CCNC: 16 U/L
BASOPHILS # BLD AUTO: 0.02 K/UL
BASOPHILS NFR BLD: 0.4 %
BILIRUB SERPL-MCNC: 0.4 MG/DL
BUN SERPL-MCNC: 6 MG/DL
CALCIUM SERPL-MCNC: 8.6 MG/DL
CHLORIDE SERPL-SCNC: 110 MMOL/L
CO2 SERPL-SCNC: 23 MMOL/L
CREAT SERPL-MCNC: 0.7 MG/DL
DIFFERENTIAL METHOD: ABNORMAL
EOSINOPHIL # BLD AUTO: 0.1 K/UL
EOSINOPHIL NFR BLD: 1.3 %
ERYTHROCYTE [DISTWIDTH] IN BLOOD BY AUTOMATED COUNT: 13.5 %
EST. GFR  (AFRICAN AMERICAN): >60 ML/MIN/1.73 M^2
EST. GFR  (NON AFRICAN AMERICAN): >60 ML/MIN/1.73 M^2
GLUCOSE SERPL-MCNC: 96 MG/DL
HCT VFR BLD AUTO: 35 %
HGB BLD-MCNC: 11.5 G/DL
IMM GRANULOCYTES # BLD AUTO: 0.01 K/UL
IMM GRANULOCYTES NFR BLD AUTO: 0.2 %
LYMPHOCYTES # BLD AUTO: 1.4 K/UL
LYMPHOCYTES NFR BLD: 25.6 %
MAGNESIUM SERPL-MCNC: 1.6 MG/DL
MCH RBC QN AUTO: 29.8 PG
MCHC RBC AUTO-ENTMCNC: 32.9 G/DL
MCV RBC AUTO: 91 FL
MONOCYTES # BLD AUTO: 0.4 K/UL
MONOCYTES NFR BLD: 7.4 %
NEUTROPHILS # BLD AUTO: 3.4 K/UL
NEUTROPHILS NFR BLD: 65.1 %
NRBC BLD-RTO: 0 /100 WBC
PHOSPHATE SERPL-MCNC: 4.1 MG/DL
PLATELET # BLD AUTO: 275 K/UL
PMV BLD AUTO: 9.6 FL
POCT GLUCOSE: 106 MG/DL (ref 70–110)
POTASSIUM SERPL-SCNC: 4.1 MMOL/L
PROT SERPL-MCNC: 6.7 G/DL
RBC # BLD AUTO: 3.86 M/UL
SODIUM SERPL-SCNC: 140 MMOL/L
WBC # BLD AUTO: 5.28 K/UL

## 2018-09-01 PROCEDURE — 25000003 PHARM REV CODE 250: Performed by: PHYSICIAN ASSISTANT

## 2018-09-01 PROCEDURE — 99233 SBSQ HOSP IP/OBS HIGH 50: CPT | Mod: ,,, | Performed by: PHYSICIAN ASSISTANT

## 2018-09-01 PROCEDURE — 93010 ELECTROCARDIOGRAM REPORT: CPT | Mod: 76,,, | Performed by: INTERNAL MEDICINE

## 2018-09-01 PROCEDURE — 63600175 PHARM REV CODE 636 W HCPCS: Performed by: PHYSICIAN ASSISTANT

## 2018-09-01 PROCEDURE — 11000001 HC ACUTE MED/SURG PRIVATE ROOM

## 2018-09-01 PROCEDURE — 63600175 PHARM REV CODE 636 W HCPCS: Performed by: NURSE PRACTITIONER

## 2018-09-01 PROCEDURE — A4216 STERILE WATER/SALINE, 10 ML: HCPCS | Performed by: PHYSICIAN ASSISTANT

## 2018-09-01 PROCEDURE — 84100 ASSAY OF PHOSPHORUS: CPT

## 2018-09-01 PROCEDURE — 80053 COMPREHEN METABOLIC PANEL: CPT

## 2018-09-01 PROCEDURE — 83735 ASSAY OF MAGNESIUM: CPT

## 2018-09-01 PROCEDURE — 93010 ELECTROCARDIOGRAM REPORT: CPT | Mod: ,,, | Performed by: INTERNAL MEDICINE

## 2018-09-01 PROCEDURE — 93005 ELECTROCARDIOGRAM TRACING: CPT

## 2018-09-01 PROCEDURE — 85025 COMPLETE CBC W/AUTO DIFF WBC: CPT

## 2018-09-01 PROCEDURE — 63600175 PHARM REV CODE 636 W HCPCS: Performed by: HOSPITALIST

## 2018-09-01 PROCEDURE — 25000003 PHARM REV CODE 250: Performed by: HOSPITALIST

## 2018-09-01 RX ORDER — LORAZEPAM 2 MG/ML
1 INJECTION INTRAMUSCULAR ONCE
Status: COMPLETED | OUTPATIENT
Start: 2018-09-01 | End: 2018-09-01

## 2018-09-01 RX ADMIN — OXYCODONE HYDROCHLORIDE AND ACETAMINOPHEN 1 TABLET: 10; 325 TABLET ORAL at 10:09

## 2018-09-01 RX ADMIN — ZOLPIDEM TARTRATE 10 MG: 5 TABLET ORAL at 11:09

## 2018-09-01 RX ADMIN — RANITIDINE 150 MG: 15 SYRUP ORAL at 06:09

## 2018-09-01 RX ADMIN — DIPHENOXYLATE HYDROCHLORIDE AND ATROPINE SULFATE 1 TABLET: 2.5; .025 TABLET ORAL at 10:09

## 2018-09-01 RX ADMIN — Medication 5 ML: at 08:09

## 2018-09-01 RX ADMIN — ALPRAZOLAM 1 MG: 1 TABLET ORAL at 12:09

## 2018-09-01 RX ADMIN — ALPRAZOLAM 1 MG: 1 TABLET ORAL at 01:09

## 2018-09-01 RX ADMIN — SODIUM CHLORIDE: 0.9 INJECTION, SOLUTION INTRAVENOUS at 02:09

## 2018-09-01 RX ADMIN — OXYCODONE HYDROCHLORIDE AND ACETAMINOPHEN 1 TABLET: 10; 325 TABLET ORAL at 02:09

## 2018-09-01 RX ADMIN — LORAZEPAM 1 MG: 2 INJECTION INTRAMUSCULAR; INTRAVENOUS at 07:09

## 2018-09-01 RX ADMIN — SODIUM CHLORIDE: 0.9 INJECTION, SOLUTION INTRAVENOUS at 11:09

## 2018-09-01 RX ADMIN — PROMETHAZINE HYDROCHLORIDE 25 MG: 12.5 TABLET ORAL at 10:09

## 2018-09-01 RX ADMIN — LORAZEPAM 1 MG: 2 INJECTION INTRAMUSCULAR; INTRAVENOUS at 08:09

## 2018-09-01 RX ADMIN — DRONABINOL 2.5 MG: 2.5 CAPSULE ORAL at 04:09

## 2018-09-01 RX ADMIN — OXYCODONE HYDROCHLORIDE AND ACETAMINOPHEN 1 TABLET: 10; 325 TABLET ORAL at 08:09

## 2018-09-01 RX ADMIN — SODIUM CHLORIDE: 0.9 INJECTION, SOLUTION INTRAVENOUS at 05:09

## 2018-09-01 RX ADMIN — DRONABINOL 2.5 MG: 2.5 CAPSULE ORAL at 09:09

## 2018-09-01 RX ADMIN — LOPERAMIDE HYDROCHLORIDE 2 MG: 2 CAPSULE ORAL at 08:09

## 2018-09-01 RX ADMIN — BUDESONIDE 9 MG: 3 CAPSULE, GELATIN COATED ORAL at 08:09

## 2018-09-01 RX ADMIN — DIPHENOXYLATE HYDROCHLORIDE AND ATROPINE SULFATE 1 TABLET: 2.5; .025 TABLET ORAL at 04:09

## 2018-09-01 RX ADMIN — VALACYCLOVIR 500 MG: 500 TABLET, FILM COATED ORAL at 08:09

## 2018-09-01 NOTE — SUBJECTIVE & OBJECTIVE
Interval History: Pt resting in bed in anticipation of MRI results. Reports reported episodes of dizziness, palpitaions and flushing.     Review of Systems   Constitutional: Negative for fever.   HENT: Negative for facial swelling.         Facial flushing   Eyes: Positive for visual disturbance (2/2 to stye).   Respiratory: Negative for shortness of breath.    Cardiovascular: Positive for palpitations. Negative for chest pain.   Gastrointestinal: Positive for abdominal pain. Negative for nausea.   Genitourinary: Negative for difficulty urinating and dysuria.   Musculoskeletal: Negative for back pain and gait problem.   Skin: Positive for color change.        Skin flushing   Neurological: Positive for dizziness. Negative for syncope.   Psychiatric/Behavioral: Negative for agitation.     Objective:     Vital Signs (Most Recent):  Temp: 99.1 °F (37.3 °C) (09/01/18 1308)  Pulse: 97 (09/01/18 1308)  Resp: 18 (09/01/18 1308)  BP: 126/86 (09/01/18 1308)  SpO2: 99 % (09/01/18 1308) Vital Signs (24h Range):  Temp:  [96.6 °F (35.9 °C)-99.1 °F (37.3 °C)] 99.1 °F (37.3 °C)  Pulse:  [] 97  Resp:  [12-18] 18  SpO2:  [97 %-100 %] 99 %  BP: (110-175)/(74-86) 126/86     Weight: 48.1 kg (106 lb 1.6 oz)  Body mass index is 20.05 kg/m².    Intake/Output Summary (Last 24 hours) at 9/1/2018 1548  Last data filed at 9/1/2018 0515  Gross per 24 hour   Intake 2363.33 ml   Output 600 ml   Net 1763.33 ml      Physical Exam   Constitutional: She is oriented to person, place, and time. She appears well-nourished. No distress.   HENT:   Head: Normocephalic.   Eyes: EOM are normal.   Cardiovascular: Regular rhythm.   No murmur heard.  tachy   Pulmonary/Chest: Effort normal and breath sounds normal. No respiratory distress.   Abdominal: Soft. She exhibits no distension. There is no tenderness.   Ileostomy in place   Musculoskeletal: Normal range of motion. She exhibits no edema.   Neurological: She is alert and oriented to person, place, and  time. No cranial nerve deficit.   Skin: Skin is warm and dry. She is not diaphoretic. There is erythema.   Psychiatric: She has a normal mood and affect.   Nursing note and vitals reviewed.      Significant Labs: All pertinent labs within the past 24 hours have been reviewed.    Significant Imaging: I have reviewed all pertinent imaging results/findings within the past 24 hours.

## 2018-09-01 NOTE — TREATMENT PLAN
GI Treatment Plan    Ivy Salgado is a 38 y.o. female admitted to hospital 8/30/2018 (Hospital Day: 3) due to Palpitations.     Interval History  - patient seen and examined  - reports that her pain is improving (adominal)  - reports she was able to tolerate MRE without issue overnight  - started on budesonide (broken capsules)  - still feels palpitations and presyncopal symptoms when ambulating    Objective  Temp:  [96.6 °F (35.9 °C)-99.1 °F (37.3 °C)] 98.9 °F (37.2 °C) (09/01 1551)  Pulse:  [63-97] 86 (09/01 1553)  BP: (110-172)/(72-86) 172/72 (09/01 1551)  Resp:  [12-18] 18 (09/01 1551)  SpO2:  [97 %-99 %] 98 % (09/01 1551)      Laboratory    Recent Labs   Lab  08/30/18   0918  08/31/18   0400  09/01/18   0500   HGB  12.3  11.0*  11.5*       Lab Results   Component Value Date    WBC 5.28 09/01/2018    HGB 11.5 (L) 09/01/2018    HCT 35.0 (L) 09/01/2018    MCV 91 09/01/2018     09/01/2018       Lab Results   Component Value Date     09/01/2018    K 4.1 09/01/2018     09/01/2018    CO2 23 09/01/2018    BUN 6 09/01/2018    CREATININE 0.7 09/01/2018    CALCIUM 8.6 (L) 09/01/2018    ANIONGAP 7 (L) 09/01/2018    ESTGFRAFRICA >60.0 09/01/2018    EGFRNONAA >60.0 09/01/2018       Lab Results   Component Value Date    ALT 10 09/01/2018    AST 16 09/01/2018    ALKPHOS 79 09/01/2018    BILITOT 0.4 09/01/2018       Lab Results   Component Value Date    INR 1.1 02/02/2018    INR 1.0 08/04/2017    INR 1.0 10/20/2016       Plan  - MR enterography pending results  - continue budesonide  - continue IV hydration    Thank you for involving us in the care of Ivy Salgado. Please call with any additional concerns or questions.    Paul Loya MD  Gastroenterology Fellow, PGY IV  Pager: [phone]

## 2018-09-01 NOTE — ASSESSMENT & PLAN NOTE
Chest burning/discomfort  38 y.o. to who presents with burning chest pain with radiation into neck, reports associated dizziness and lightheadedness. Symptoms lasted for ~20 mins. Pain is not reproducible on exam.      Concern for other pathology as pt with recurring episodes of flushing, dizziness, palpitations and increased BP  - previous imaging reviewed and adrenals appear stable   - will order pheochromocytoma labs   - 30-day event monitor at discharge- pt states that she was not having these episodes during the time of the eval  - EKG with sinus tachycardia, 119 BPM  -  troponin 0.006, will trend Q6H  - CXR demonstrating lung zones that appear stable and clear  - 2D ECHO with EF 60-65%  - CBC, CMP (goal Mag>2, K>4) daily  - recent work-up for palpitations with 48 holter-monitor, 4/10/2018  - 30 day event monitor revealed normal sinus rhythm and sinus tachycardia (up to 129 bpm with exercise). There were no significant arrhythmias noted.  - cardiac telemetry

## 2018-09-01 NOTE — PLAN OF CARE
Problem: Patient Care Overview  Goal: Plan of Care Review  Outcome: Ongoing (interventions implemented as appropriate)  Plan of care reviewed with patient. Iv fluids continued. Prn pain management. Xanax for anxiety. Patient managing own ileostomy. Labs in am. Tele monitoring through the night.

## 2018-09-01 NOTE — ASSESSMENT & PLAN NOTE
Crohn's Disease  She believes her current symptoms are similar to her crohn's flares in the past. She has not been on maintenance therapy for her crohn's since Feb/March due to side effects.     - GI consulted, appreciate recs   - budesonide 9mg daily   - MR enterography    - loperamide (IMODIUM) 2 mg capsule  - appears non-toxic, afebrile without leukocytosis  - stool studies completed 8/28   - C. Diff, Shigella, stool culture unremarkable  - promethazine (PHENERGAN) 25 MG tablet PRN   - monitor for signs of infection

## 2018-09-01 NOTE — PLAN OF CARE
Problem: Patient Care Overview  Goal: Plan of Care Review  Pt is aaox3. Vss. resp  Even and unlabored. Bed locked and in low position. Side rails raised x2. Nurse call bell within reach. Will continue to monitor

## 2018-09-02 PROBLEM — R00.0 SINUS TACHYCARDIA: Status: ACTIVE | Noted: 2018-09-02

## 2018-09-02 LAB
ALBUMIN SERPL BCP-MCNC: 3.5 G/DL
ALP SERPL-CCNC: 77 U/L
ALT SERPL W/O P-5'-P-CCNC: 10 U/L
ANION GAP SERPL CALC-SCNC: 8 MMOL/L
AST SERPL-CCNC: 13 U/L
BASOPHILS # BLD AUTO: 0.03 K/UL
BASOPHILS NFR BLD: 0.5 %
BILIRUB SERPL-MCNC: 0.5 MG/DL
BUN SERPL-MCNC: 6 MG/DL
CALCIUM SERPL-MCNC: 8.8 MG/DL
CHLORIDE SERPL-SCNC: 109 MMOL/L
CO2 SERPL-SCNC: 22 MMOL/L
CREAT SERPL-MCNC: 0.7 MG/DL
CRP SERPL-MCNC: 0.6 MG/L
DIFFERENTIAL METHOD: ABNORMAL
EOSINOPHIL # BLD AUTO: 0.1 K/UL
EOSINOPHIL NFR BLD: 1 %
ERYTHROCYTE [DISTWIDTH] IN BLOOD BY AUTOMATED COUNT: 13.3 %
ERYTHROCYTE [SEDIMENTATION RATE] IN BLOOD BY WESTERGREN METHOD: 9 MM/HR
EST. GFR  (AFRICAN AMERICAN): >60 ML/MIN/1.73 M^2
EST. GFR  (NON AFRICAN AMERICAN): >60 ML/MIN/1.73 M^2
GLUCOSE SERPL-MCNC: 96 MG/DL
HCT VFR BLD AUTO: 34.2 %
HGB BLD-MCNC: 11.1 G/DL
IMM GRANULOCYTES # BLD AUTO: 0 K/UL
IMM GRANULOCYTES NFR BLD AUTO: 0 %
LYMPHOCYTES # BLD AUTO: 2.3 K/UL
LYMPHOCYTES NFR BLD: 37.7 %
MAGNESIUM SERPL-MCNC: 1.8 MG/DL
MCH RBC QN AUTO: 29 PG
MCHC RBC AUTO-ENTMCNC: 32.5 G/DL
MCV RBC AUTO: 89 FL
MONOCYTES # BLD AUTO: 0.6 K/UL
MONOCYTES NFR BLD: 9.1 %
NEUTROPHILS # BLD AUTO: 3.2 K/UL
NEUTROPHILS NFR BLD: 51.7 %
NRBC BLD-RTO: 0 /100 WBC
PHOSPHATE SERPL-MCNC: 3.8 MG/DL
PLATELET # BLD AUTO: 299 K/UL
PMV BLD AUTO: 9.7 FL
POTASSIUM SERPL-SCNC: 3.5 MMOL/L
PROT SERPL-MCNC: 6.9 G/DL
RBC # BLD AUTO: 3.83 M/UL
SODIUM SERPL-SCNC: 139 MMOL/L
TROPONIN I SERPL DL<=0.01 NG/ML-MCNC: 0.01 NG/ML
WBC # BLD AUTO: 6.15 K/UL

## 2018-09-02 PROCEDURE — 25000003 PHARM REV CODE 250: Performed by: PHYSICIAN ASSISTANT

## 2018-09-02 PROCEDURE — 25500020 PHARM REV CODE 255: Performed by: STUDENT IN AN ORGANIZED HEALTH CARE EDUCATION/TRAINING PROGRAM

## 2018-09-02 PROCEDURE — 85652 RBC SED RATE AUTOMATED: CPT

## 2018-09-02 PROCEDURE — 36415 COLL VENOUS BLD VENIPUNCTURE: CPT

## 2018-09-02 PROCEDURE — 84100 ASSAY OF PHOSPHORUS: CPT

## 2018-09-02 PROCEDURE — 84484 ASSAY OF TROPONIN QUANT: CPT

## 2018-09-02 PROCEDURE — 86316 IMMUNOASSAY TUMOR OTHER: CPT

## 2018-09-02 PROCEDURE — 63600175 PHARM REV CODE 636 W HCPCS: Performed by: PHYSICIAN ASSISTANT

## 2018-09-02 PROCEDURE — 87040 BLOOD CULTURE FOR BACTERIA: CPT | Mod: 59

## 2018-09-02 PROCEDURE — 11000001 HC ACUTE MED/SURG PRIVATE ROOM

## 2018-09-02 PROCEDURE — 85025 COMPLETE CBC W/AUTO DIFF WBC: CPT

## 2018-09-02 PROCEDURE — 25000003 PHARM REV CODE 250: Performed by: HOSPITALIST

## 2018-09-02 PROCEDURE — 86140 C-REACTIVE PROTEIN: CPT

## 2018-09-02 PROCEDURE — 84260 ASSAY OF SEROTONIN: CPT

## 2018-09-02 PROCEDURE — 99233 SBSQ HOSP IP/OBS HIGH 50: CPT | Mod: ,,, | Performed by: INTERNAL MEDICINE

## 2018-09-02 PROCEDURE — 99233 SBSQ HOSP IP/OBS HIGH 50: CPT | Mod: ,,, | Performed by: PHYSICIAN ASSISTANT

## 2018-09-02 PROCEDURE — 80053 COMPREHEN METABOLIC PANEL: CPT

## 2018-09-02 PROCEDURE — 83735 ASSAY OF MAGNESIUM: CPT

## 2018-09-02 RX ORDER — LORAZEPAM 2 MG/ML
1 INJECTION INTRAMUSCULAR ONCE
Status: COMPLETED | OUTPATIENT
Start: 2018-09-02 | End: 2018-09-02

## 2018-09-02 RX ORDER — METOPROLOL TARTRATE 1 MG/ML
5 INJECTION, SOLUTION INTRAVENOUS ONCE
Status: COMPLETED | OUTPATIENT
Start: 2018-09-02 | End: 2018-09-02

## 2018-09-02 RX ADMIN — DIPHENOXYLATE HYDROCHLORIDE AND ATROPINE SULFATE 1 TABLET: 2.5; .025 TABLET ORAL at 04:09

## 2018-09-02 RX ADMIN — SODIUM CHLORIDE: 0.9 INJECTION, SOLUTION INTRAVENOUS at 09:09

## 2018-09-02 RX ADMIN — VALACYCLOVIR 500 MG: 500 TABLET, FILM COATED ORAL at 09:09

## 2018-09-02 RX ADMIN — PROMETHAZINE HYDROCHLORIDE 25 MG: 12.5 TABLET ORAL at 06:09

## 2018-09-02 RX ADMIN — IOHEXOL 15 ML: 350 INJECTION, SOLUTION INTRAVENOUS at 03:09

## 2018-09-02 RX ADMIN — DIPHENOXYLATE HYDROCHLORIDE AND ATROPINE SULFATE 1 TABLET: 2.5; .025 TABLET ORAL at 09:09

## 2018-09-02 RX ADMIN — OXYCODONE HYDROCHLORIDE AND ACETAMINOPHEN 1 TABLET: 10; 325 TABLET ORAL at 05:09

## 2018-09-02 RX ADMIN — METOPROLOL TARTRATE 5 MG: 5 INJECTION, SOLUTION INTRAVENOUS at 02:09

## 2018-09-02 RX ADMIN — DRONABINOL 2.5 MG: 2.5 CAPSULE ORAL at 06:09

## 2018-09-02 RX ADMIN — DIPHENOXYLATE HYDROCHLORIDE AND ATROPINE SULFATE 1 TABLET: 2.5; .025 TABLET ORAL at 08:09

## 2018-09-02 RX ADMIN — ZOLPIDEM TARTRATE 10 MG: 5 TABLET ORAL at 11:09

## 2018-09-02 RX ADMIN — BUDESONIDE 9 MG: 3 CAPSULE, GELATIN COATED ORAL at 09:09

## 2018-09-02 RX ADMIN — LORAZEPAM 1 MG: 2 INJECTION INTRAMUSCULAR; INTRAVENOUS at 02:09

## 2018-09-02 RX ADMIN — ALPRAZOLAM 1 MG: 1 TABLET ORAL at 07:09

## 2018-09-02 RX ADMIN — PROMETHAZINE HYDROCHLORIDE 25 MG: 12.5 TABLET ORAL at 03:09

## 2018-09-02 RX ADMIN — ALPRAZOLAM 1 MG: 1 TABLET ORAL at 10:09

## 2018-09-02 RX ADMIN — IOHEXOL 15 ML: 350 INJECTION, SOLUTION INTRAVENOUS at 04:09

## 2018-09-02 RX ADMIN — LOPERAMIDE HYDROCHLORIDE 2 MG: 2 CAPSULE ORAL at 09:09

## 2018-09-02 RX ADMIN — SODIUM CHLORIDE: 0.9 INJECTION, SOLUTION INTRAVENOUS at 05:09

## 2018-09-02 RX ADMIN — DIPHENOXYLATE HYDROCHLORIDE AND ATROPINE SULFATE 1 TABLET: 2.5; .025 TABLET ORAL at 12:09

## 2018-09-02 RX ADMIN — OXYCODONE HYDROCHLORIDE AND ACETAMINOPHEN 1 TABLET: 10; 325 TABLET ORAL at 10:09

## 2018-09-02 RX ADMIN — DRONABINOL 2.5 MG: 2.5 CAPSULE ORAL at 03:09

## 2018-09-02 NOTE — CONSULTS
"[facility]  Cardiac Electrophysiology  Consult Note    Admission Date: 8/30/2018  Code Status: Full Code   Attending Provider: Candice Chamberlain MD  Consulting Provider: Jesi Gonzales MD  Principal Problem:Exacerbation of Crohn's disease    Inpatient consult to Electrophysiology  Consult performed by: Jesi Gonzales MD  Consult ordered by: Manish Leung PA-C        Subjective:     Chief Complaint:  Chest pain.     HPI:   EP is consulted for evaluation of symptomatic tachycardia.     Ivy Salgado is a 37 yo F with Crohn's disease s/p total colectomy w/ end ileostomy (2012), HTN, anxiety d/o who is admitted on 8/30 with initial complaints of chest pain and palpations. Claims she developed sudden onset chest discomfort described as an intense "squeezing" sensation accompanied by palpitations, dizziness/lightheadedness, diaphoresis while driving this past Thursday. She presented to the ED for further eval given a similar episode the day prior. On arrival she was noted to be afebrile and hemodynamically stable. EKG showed -119 bpm, HAZEL 128, QRS 72, QTc 457. Troponin (-) x 2. TSH WNL. Echocardiogram 8/31 revealed normal EF 60-65% with no valvular or structural abnormalities. She was admitted under telemetry for treatment of suspected Crohn's flare given complaints of abdominal pain x 2 weeks associated with nausea and poor PO intake (in addition to her CP complaints). While in the hospital she is noted to become tachycardic up to 130s-140s. Review of telemetry (and EKGs) show sinus tachycardia. While symptomatic during these events she is hemodynamically stable.     Patient reports infrequent episodes of similar chest discomfort since 2/2018.     It should be noted that she has been evaluated by outpatient Cardiology clinic as detailed below:  -- Saw Dr. Saucedo w/ Dr AMY Monsalve (2/21/2018): 48-hour Holter was ordered which demonstrated HR  bpm (sinus rhythm) with no " "evidence of sustained arrhythmia/VT/SVT, etc. Patient was informed of these findings and provided with reassurance.  -- Saw Dr. Lewis cartagena/ Dr. Sinha (4/17/2018): Patient seen in follow up. Reported recurrent symptomatic episodes and so a 30-day cardiac event monitor was ordered --> per report 18 transmissions were sent. First was a baseline which showed sinus tachycardia (120 bpm). Subsequent transmissions were for "palpitations, shortness of breath, dizziness, chest pain" which showed NSR and sinus tachycardia up to 129 bpm. No arrhythmias noted. Patient provided with reassurance again. No interventions done.       Patient denies significant alcohol use or recreational drug use. Does not use OTC decongestants or herbal supplements, etc. Does not drink more than 1 caffeineted beverage per day. No personal or FHx arrhythmias.     Past Medical History:   Diagnosis Date    Abnormal Pap smear 2007    Abnormal Pap smear 5/26/2011    Anemia     Anxiety     Arthritis     C. difficile diarrhea     Crohn's disease     Depression 8/5/2017    Encounter for blood transfusion     Genital HSV     History of colposcopy with cervical biopsy 2007 and 7/2011 2007-LYLA I  and 7/2011- LYLA I    Hypertension     Kidney stone     Kidney stone     Recurrent UTI 4/3/2013    S/P ileostomy 7/9/2012    Sterilization 6/23/2012       Past Surgical History:   Procedure Laterality Date    ABDOMINAL SURGERY      APPENDECTOMY      BLADDER SURGERY      partial cystectomy due to fistula    CKC      COLON SURGERY      COLONOSCOPY      HYSTERECTOMY  04/16/2015    SHELLIE    ILEOSTOMY      OOPHORECTOMY Right 04/16/2015    PORTACATH PLACEMENT      SKIN BIOPSY      SMALL INTESTINE SURGERY      TOTAL COLECTOMY      TUBAL LIGATION  06 06 2012    UPPER GASTROINTESTINAL ENDOSCOPY         Review of patient's allergies indicates:   Allergen Reactions    Stelara [ustekinumab] Other (See Comments)     Multiple infections    " Zofran [ondansetron hcl (pf)] Other (See Comments)     Per patient causes prolong QT    Vancomycin analogues Hives    Azathioprine sodium      Other reaction(s): pancreatitis  Other reaction(s): pancreatitis    Methotrexate analogues     Morphine Itching and Other (See Comments)     Other reaction(s): Itching       No current facility-administered medications on file prior to encounter.      Current Outpatient Medications on File Prior to Encounter   Medication Sig    ALPRAZolam (XANAX) 1 MG tablet TAKE 1 TABLET BY MOUTH TWICE DAILY AS NEEDED FOR ANXIETY    biotin 1,000 mcg Chew Take 1 tablet by mouth once daily.    clindamycin (CLINDESSE) 2 % vaginal cream PLACE VAGINALLY EVERY EVENING    diphenoxylate-atropine 2.5-0.025 mg (LOMOTIL) 2.5-0.025 mg per tablet Take 1 tablet by mouth 4 (four) times daily as needed for Diarrhea.    dronabinol (MARINOL) 2.5 MG capsule Take 1 capsule (2.5 mg total) by mouth 2 (two) times daily before meals.    ergocalciferol (ERGOCALCIFEROL) 50,000 unit Cap Take 1 capsule (50,000 Units total) by mouth every 7 days.    loperamide (IMODIUM) 2 mg capsule Take 2 mg by mouth daily as needed for Diarrhea.    magnesium 250 mg Tab Take by mouth once.    multivitamin (ONE DAILY MULTIVITAMIN) per tablet Take 1 tablet by mouth once daily.    norethindrone-ethinyl estradiol (JUNEL FE 1/20) 1 mg-20 mcg (21)/75 mg (7) per tablet Take 1 tablet by mouth once daily.    oxyCODONE-acetaminophen (PERCOCET)  mg per tablet Take 1 tablet by mouth every 6 (six) hours as needed for Pain.    potassium citrate 15 mEq TbSR Take 2 tablets by mouth 2 (two) times daily.    promethazine (PHENERGAN) 25 MG tablet Take 1 tablet (25 mg total) by mouth every 6 (six) hours as needed.    sumatriptan (IMITREX) 100 MG tablet Take 1 tablet (100 mg total) by mouth once.    terconazole (TERAZOL 7) 0.4 % Crea INSERT ONE APPLICATORFUL VAGINALLY AT BEDTIME    valACYclovir (VALTREX) 500 MG tablet Take 1 tablet  (500 mg total) by mouth once daily.    zolpidem (AMBIEN) 10 mg Tab Take 1 tablet (10 mg total) by mouth every evening.     Family History     Problem Relation (Age of Onset)    Cancer Father, Maternal Grandfather    Colon cancer Father    Crohn's disease Brother    Endometrial cancer Maternal Aunt    Hypertension Mother    Skin cancer Maternal Grandfather        Tobacco Use    Smoking status: Never Smoker    Smokeless tobacco: Never Used   Substance and Sexual Activity    Alcohol use: Yes     Alcohol/week: 0.0 oz     Comment: Patient only drinks wine not regular basis.    Drug use: No    Sexual activity: Yes     Partners: Male     Birth control/protection: Pill, Surgical, Condom     Comment: HYST     Review of Systems   Constitution: Negative for chills, diaphoresis, fever, night sweats and weight gain.   Cardiovascular: Positive for chest pain and palpitations. Negative for dyspnea on exertion, irregular heartbeat, paroxysmal nocturnal dyspnea and syncope.   Respiratory: Negative for shortness of breath.    Gastrointestinal: Positive for abdominal pain, change in bowel habit and nausea. Negative for vomiting.   Genitourinary: Negative for dysuria, hematuria and urgency.   Neurological: Positive for dizziness and light-headedness. Negative for focal weakness, headaches and numbness.     Objective:     Vital Signs (Most Recent):  Temp: 98.8 °F (37.1 °C) (09/02/18 1125)  Pulse: 105 (09/02/18 1125)  Resp: 18 (09/02/18 1125)  BP: (!) 141/87 (09/02/18 1125)  SpO2: 99 % (09/02/18 1125) Vital Signs (24h Range):  Temp:  [98.2 °F (36.8 °C)-99.7 °F (37.6 °C)] 98.8 °F (37.1 °C)  Pulse:  [] 105  Resp:  [12-20] 18  SpO2:  [97 %-100 %] 99 %  BP: (125-172)/() 141/87       Weight: 48.1 kg (106 lb 1.6 oz)  Body mass index is 20.05 kg/m².    SpO2: 99 %  O2 Device (Oxygen Therapy): room air    Physical Exam   Constitutional: She is oriented to person, place, and time. She appears well-developed and well-nourished. No  distress.   HENT:   Head: Normocephalic and atraumatic.   Mouth/Throat: Oropharynx is clear and moist.   Eyes: EOM are normal. Pupils are equal, round, and reactive to light.   Neck: Normal range of motion. Neck supple. No JVD present.   Cardiovascular: Regular rhythm. Exam reveals no gallop and no friction rub.   No murmur heard.  Tachycardic   Pulmonary/Chest: Effort normal and breath sounds normal. No respiratory distress. She has no rales.   Port over L anterior chest wall   Abdominal: Soft. Bowel sounds are normal. She exhibits no distension. There is no tenderness.   Ostomy over right quadrant   Musculoskeletal: Normal range of motion. She exhibits no edema.   Neurological: She is alert and oriented to person, place, and time.   Skin: Skin is warm and dry. No erythema.   Psychiatric: She has a normal mood and affect. Her behavior is normal. Judgment and thought content normal.       Significant Labs: All pertinent lab results from the last 24 hours have been reviewed.    Significant Imaging: Reviewed in EPIC              Assessment and Plan:     Sinus tachycardia    39 yo F with Crohn's disease s/p total colectomy w/ end ileostomy (2012), HTN, anxiety d/o admitted on 8/30 with initial complaints of transient chest pain, palpitations, diaphoresis - episodes which have been occurring on an infrequent basis since 2/2018. Review of previous 48-hour Holter, 30-day cardiac event monitor, EKGs, and inpatient telemetry (this admission) demonstrates sinus tachycardia. There is no evidence of sustained arrhythmia of concern. Echo 8/31 reviewed with normal function/structure. TSH WNL.     Due to presence and degree of symptoms during episodes of ST patient may benefit from AV ceferino blocking agent on a PRN basis.      Recommendations:  -- Consider initiating Toprol XL 25 mg PRN chest pain/palpitations. May prescribe on discharge if patient finds benefit with controlling her symptoms.  -- Address any reversible causes of  sinus tachycardia, eg fever, anemia, dehydration, pain, infection, etc  -- EP will sign off, do not hesitate to call with questions/concerns. No need for EP follow up on discharge.            Thank you for your consult.    Jesi Gonzales MD  Cardiac Electrophysiology  [facility]    This patient was discussed with Dr. Haro.

## 2018-09-02 NOTE — SUBJECTIVE & OBJECTIVE
Past Medical History:   Diagnosis Date    Abnormal Pap smear 2007    Abnormal Pap smear 5/26/2011    Anemia     Anxiety     Arthritis     C. difficile diarrhea     Crohn's disease     Depression 8/5/2017    Encounter for blood transfusion     Genital HSV     History of colposcopy with cervical biopsy 2007 and 7/2011 2007-LYLA I  and 7/2011- LYLA I    Hypertension     Kidney stone     Kidney stone     Recurrent UTI 4/3/2013    S/P ileostomy 7/9/2012    Sterilization 6/23/2012       Past Surgical History:   Procedure Laterality Date    ABDOMINAL SURGERY      APPENDECTOMY      BLADDER SURGERY      partial cystectomy due to fistula    CKC      COLON SURGERY      COLONOSCOPY      HYSTERECTOMY  04/16/2015    SHELLIE    ILEOSTOMY      OOPHORECTOMY Right 04/16/2015    PORTACATH PLACEMENT      SKIN BIOPSY      SMALL INTESTINE SURGERY      TOTAL COLECTOMY      TUBAL LIGATION  06 06 2012    UPPER GASTROINTESTINAL ENDOSCOPY         Review of patient's allergies indicates:   Allergen Reactions    Stelara [ustekinumab] Other (See Comments)     Multiple infections    Zofran [ondansetron hcl (pf)] Other (See Comments)     Per patient causes prolong QT    Vancomycin analogues Hives    Azathioprine sodium      Other reaction(s): pancreatitis  Other reaction(s): pancreatitis    Methotrexate analogues     Morphine Itching and Other (See Comments)     Other reaction(s): Itching       No current facility-administered medications on file prior to encounter.      Current Outpatient Medications on File Prior to Encounter   Medication Sig    ALPRAZolam (XANAX) 1 MG tablet TAKE 1 TABLET BY MOUTH TWICE DAILY AS NEEDED FOR ANXIETY    biotin 1,000 mcg Chew Take 1 tablet by mouth once daily.    clindamycin (CLINDESSE) 2 % vaginal cream PLACE VAGINALLY EVERY EVENING    diphenoxylate-atropine 2.5-0.025 mg (LOMOTIL) 2.5-0.025 mg per tablet Take 1 tablet by mouth 4 (four) times daily as needed for Diarrhea.     dronabinol (MARINOL) 2.5 MG capsule Take 1 capsule (2.5 mg total) by mouth 2 (two) times daily before meals.    ergocalciferol (ERGOCALCIFEROL) 50,000 unit Cap Take 1 capsule (50,000 Units total) by mouth every 7 days.    loperamide (IMODIUM) 2 mg capsule Take 2 mg by mouth daily as needed for Diarrhea.    magnesium 250 mg Tab Take by mouth once.    multivitamin (ONE DAILY MULTIVITAMIN) per tablet Take 1 tablet by mouth once daily.    norethindrone-ethinyl estradiol (JUNEL FE 1/20) 1 mg-20 mcg (21)/75 mg (7) per tablet Take 1 tablet by mouth once daily.    oxyCODONE-acetaminophen (PERCOCET)  mg per tablet Take 1 tablet by mouth every 6 (six) hours as needed for Pain.    potassium citrate 15 mEq TbSR Take 2 tablets by mouth 2 (two) times daily.    promethazine (PHENERGAN) 25 MG tablet Take 1 tablet (25 mg total) by mouth every 6 (six) hours as needed.    sumatriptan (IMITREX) 100 MG tablet Take 1 tablet (100 mg total) by mouth once.    terconazole (TERAZOL 7) 0.4 % Crea INSERT ONE APPLICATORFUL VAGINALLY AT BEDTIME    valACYclovir (VALTREX) 500 MG tablet Take 1 tablet (500 mg total) by mouth once daily.    zolpidem (AMBIEN) 10 mg Tab Take 1 tablet (10 mg total) by mouth every evening.     Family History     Problem Relation (Age of Onset)    Cancer Father, Maternal Grandfather    Colon cancer Father    Crohn's disease Brother    Endometrial cancer Maternal Aunt    Hypertension Mother    Skin cancer Maternal Grandfather        Tobacco Use    Smoking status: Never Smoker    Smokeless tobacco: Never Used   Substance and Sexual Activity    Alcohol use: Yes     Alcohol/week: 0.0 oz     Comment: Patient only drinks wine not regular basis.    Drug use: No    Sexual activity: Yes     Partners: Male     Birth control/protection: Pill, Surgical, Condom     Comment: HYST     Review of Systems   Constitution: Negative for chills, diaphoresis, fever, night sweats and weight gain.   Cardiovascular: Positive  for chest pain and palpitations. Negative for dyspnea on exertion, irregular heartbeat, paroxysmal nocturnal dyspnea and syncope.   Respiratory: Negative for shortness of breath.    Gastrointestinal: Positive for abdominal pain, change in bowel habit and nausea. Negative for vomiting.   Genitourinary: Negative for dysuria, hematuria and urgency.   Neurological: Positive for dizziness and light-headedness. Negative for focal weakness, headaches and numbness.     Objective:     Vital Signs (Most Recent):  Temp: 98.8 °F (37.1 °C) (09/02/18 1125)  Pulse: 105 (09/02/18 1125)  Resp: 18 (09/02/18 1125)  BP: (!) 141/87 (09/02/18 1125)  SpO2: 99 % (09/02/18 1125) Vital Signs (24h Range):  Temp:  [98.2 °F (36.8 °C)-99.7 °F (37.6 °C)] 98.8 °F (37.1 °C)  Pulse:  [] 105  Resp:  [12-20] 18  SpO2:  [97 %-100 %] 99 %  BP: (125-172)/() 141/87       Weight: 48.1 kg (106 lb 1.6 oz)  Body mass index is 20.05 kg/m².    SpO2: 99 %  O2 Device (Oxygen Therapy): room air    Physical Exam   Constitutional: She is oriented to person, place, and time. She appears well-developed and well-nourished. No distress.   HENT:   Head: Normocephalic and atraumatic.   Mouth/Throat: Oropharynx is clear and moist.   Eyes: EOM are normal. Pupils are equal, round, and reactive to light.   Neck: Normal range of motion. Neck supple. No JVD present.   Cardiovascular: Regular rhythm. Exam reveals no gallop and no friction rub.   No murmur heard.  Tachycardic   Pulmonary/Chest: Effort normal and breath sounds normal. No respiratory distress. She has no rales.   Port over L anterior chest wall   Abdominal: Soft. Bowel sounds are normal. She exhibits no distension. There is no tenderness.   Ostomy over right quadrant   Musculoskeletal: Normal range of motion. She exhibits no edema.   Neurological: She is alert and oriented to person, place, and time.   Skin: Skin is warm and dry. No erythema.   Psychiatric: She has a normal mood and affect. Her behavior  is normal. Judgment and thought content normal.       Significant Labs: All pertinent lab results from the last 24 hours have been reviewed.    Significant Imaging: Reviewed in EPIC

## 2018-09-02 NOTE — HPI
"EP is consulted for evaluation of symptomatic tachycardia.     Ivy Salgado is a 37 yo F with Crohn's disease s/p total colectomy w/ end ileostomy (2012), HTN, anxiety d/o who is admitted on 8/30 with initial complaints of chest pain and palpations. Claims she developed sudden onset chest discomfort described as an intense "squeezing" sensation accompanied by palpitations, dizziness/lightheadedness, diaphoresis while driving this past Thursday. She presented to the ED for further eval given a similar episode the day prior. On arrival she was noted to be afebrile and hemodynamically stable. EKG showed -119 bpm, HAZEL 128, QRS 72, QTc 457. Troponin (-) x 2. TSH WNL. Echocardiogram 8/31 revealed normal EF 60-65% with no valvular or structural abnormalities. She was admitted under telemetry for treatment of suspected Crohn's flare given complaints of abdominal pain x 2 weeks associated with nausea and poor PO intake (in addition to her CP complaints). While in the hospital she is noted to become tachycardic up to 130s-140s. Review of telemetry (and EKGs) show sinus tachycardia. While symptomatic during these events she is hemodynamically stable.     Patient reports infrequent episodes of similar chest discomfort since 2/2018.     It should be noted that she has been evaluated by outpatient Cardiology clinic as detailed below:  -- Saw Dr. Saucedo w/ Dr AMY Monsalve (2/21/2018): 48-hour Holter was ordered which demonstrated HR  bpm (sinus rhythm) with no evidence of sustained arrhythmia/VT/SVT, etc. Patient was informed of these findings and provided with reassurance.  -- Saw Dr. Saucedo w/ Dr. Sinha (4/17/2018): Patient seen in follow up. Reported recurrent symptomatic episodes and so a 30-day cardiac event monitor was ordered --> per report 18 transmissions were sent. First was a baseline which showed sinus tachycardia (120 bpm). Subsequent transmissions were for "palpitations, shortness of breath, " "dizziness, chest pain" which showed NSR and sinus tachycardia up to 129 bpm. No arrhythmias noted. Patient provided with reassurance again. No interventions done.       Patient denies significant alcohol use or recreational drug use. Does not use OTC decongestants or herbal supplements, etc. Does not drink more than 1 caffeineted beverage per day. No personal or FHx arrhythmias.   "

## 2018-09-02 NOTE — NURSING
Gavino Ga PA called and states EKG is ok to continue to monitor pt for any distress. Informed PA that pts pulse is now in the 110s since receiving the Ativan IVP. No new orders noted.

## 2018-09-02 NOTE — NURSING
"Pt was given a one time dose of iv ativan as ordered. Pt's heart rate is now low 100's and states "i'm not feeling as shaky as I was." vss. resp even and unlabored. Report given to oncoming nurse    "

## 2018-09-02 NOTE — ASSESSMENT & PLAN NOTE
39 yo F with Crohn's disease s/p total colectomy w/ end ileostomy (2012), HTN, anxiety d/o admitted on 8/30 with initial complaints of transient chest pain, palpitations, diaphoresis - episodes which have been occurring on an infrequent basis since 2/2018. Review of previous 48-hour Holter, 30-day cardiac event monitor, EKGs, and inpatient telemetry (this admission) demonstrates sinus tachycardia. There is no evidence of sustained arrhythmia of concern. Echo 8/31 reviewed with normal function/structure. TSH WNL.     Due to presence and degree of symptoms during episodes of ST patient may benefit from AV ceferino blocking agent on a PRN basis.      Recommendations:  -- Consider initiating Toprol XL 25 mg PRN chest pain/palpitations. Patient may continue to take if finds it beneficial.   -- Address any reversible causes of sinus tachycardia, eg fever, anemia, dehydration, pain, infection, etc  -- EP will sign off, do not hesitate to call with questions/concerns. No need for EP follow up on discharge.

## 2018-09-02 NOTE — SUBJECTIVE & OBJECTIVE
Interval History: Overnight nursing report an episode of extreme anxiety, palpitations, HTN and dizziness. RR team called to bedside, pt evaluated by on-call NP and staff MD.   This morning pt appears relaxed, HR ~80. Facial flushing present however pt comfortable. Plan of care discussed with pt who is agreeable.     Review of Systems   Constitutional: Positive for activity change. Negative for fever.   HENT: Negative for facial swelling.         Facial flushing   Eyes: Positive for visual disturbance (2/2 to stye).   Respiratory: Negative for shortness of breath.    Cardiovascular: Positive for palpitations. Negative for chest pain.   Gastrointestinal: Positive for abdominal pain. Negative for nausea.   Genitourinary: Negative for difficulty urinating and dysuria.   Musculoskeletal: Negative for back pain and gait problem.   Skin: Positive for color change.        Skin flushing   Allergic/Immunologic: Positive for immunocompromised state.   Neurological: Positive for dizziness, tremors and weakness. Negative for syncope.   Psychiatric/Behavioral: Negative for agitation.     Objective:     Vital Signs (Most Recent):  Temp: 99.1 °F (37.3 °C) (09/02/18 0746)  Pulse: 104 (09/02/18 0746)  Resp: 16 (09/02/18 0746)  BP: (!) 135/90 (09/02/18 0746)  SpO2: 100 % (09/02/18 0746) Vital Signs (24h Range):  Temp:  [98.2 °F (36.8 °C)-99.7 °F (37.6 °C)] 99.1 °F (37.3 °C)  Pulse:  [] 104  Resp:  [12-20] 16  SpO2:  [97 %-100 %] 100 %  BP: (125-172)/() 135/90     Weight: 48.1 kg (106 lb 1.6 oz)  Body mass index is 20.05 kg/m².    Intake/Output Summary (Last 24 hours) at 9/2/2018 0927  Last data filed at 9/2/2018 0600  Gross per 24 hour   Intake 240 ml   Output 200 ml   Net 40 ml      Physical Exam   Constitutional: She is oriented to person, place, and time. She appears well-nourished. No distress.   HENT:   Head: Normocephalic.   Eyes: EOM are normal.   Cardiovascular: Normal rate and regular rhythm.   No murmur  heard.  HR ~80   Pulmonary/Chest: Effort normal and breath sounds normal. No respiratory distress.   Abdominal: Soft. She exhibits no distension. There is no tenderness.   Ileostomy in place   Musculoskeletal: Normal range of motion. She exhibits no edema.   Neurological: She is alert and oriented to person, place, and time. No cranial nerve deficit.   Skin: Skin is warm and dry. She is not diaphoretic. There is erythema (facial flushing).   Psychiatric: She has a normal mood and affect.   Nursing note and vitals reviewed.      Significant Labs: All pertinent labs within the past 24 hours have been reviewed.    Significant Imaging: I have reviewed all pertinent imaging results/findings within the past 24 hours.

## 2018-09-02 NOTE — TREATMENT PLAN
GI Treatment Plan    Ivy Salgado is a 38 y.o. female admitted to hospital 8/30/2018 (Hospital Day: 4) due to Exacerbation of Crohn's disease.     Interval History  - patient notes she feels improved today from standpoint of her palpitations since starting metoprolol  - notes she had continued RLQ abdominal pain (has had this for weeks without any significant change) pain is intermittent but currently resolved  - labs sent to exclude neuroendocrine tumor given paroxysms of tachycardia    Objective  Temp:  [98.2 °F (36.8 °C)-100.1 °F (37.8 °C)] 99.5 °F (37.5 °C) (09/02 1535)  Pulse:  [] 77 (09/02 1535)  BP: (117-153)/() 133/77 (09/02 1535)  Resp:  [12-21] 20 (09/02 1535)  SpO2:  [97 %-100 %] 99 % (09/02 1535)    General: Alert, Oriented x3, no distress  Abdomen: Normoactive bowel sounds. Non-distended. Normal tympany. Soft. Non-tender. No peritoneal signs.    Laboratory    Recent Labs   Lab  08/31/18   0400  09/01/18   0500  09/02/18   0419   HGB  11.0*  11.5*  11.1*       Lab Results   Component Value Date    WBC 6.15 09/02/2018    HGB 11.1 (L) 09/02/2018    HCT 34.2 (L) 09/02/2018    MCV 89 09/02/2018     09/02/2018       Lab Results   Component Value Date     09/02/2018    K 3.5 09/02/2018     09/02/2018    CO2 22 (L) 09/02/2018    BUN 6 09/02/2018    CREATININE 0.7 09/02/2018    CALCIUM 8.8 09/02/2018    ANIONGAP 8 09/02/2018    ESTGFRAFRICA >60.0 09/02/2018    EGFRNONAA >60.0 09/02/2018       Lab Results   Component Value Date    ALT 10 09/02/2018    AST 13 09/02/2018    ALKPHOS 77 09/02/2018    BILITOT 0.5 09/02/2018       Lab Results   Component Value Date    INR 1.1 02/02/2018    INR 1.0 08/04/2017    INR 1.0 10/20/2016       Plan  - MRE pending. Given RLQ has resolved and on further history she has had this intermittently for weeks, HDS, afebrile, no leukocytosis, is s/p appendectomy, would hold on further CT imaging at this time as MR enterography is pending and would show  any potential abscess/collections. Can reconsider if patient's pain recurs/worsens or if clinical status changes  - continue budesonide  - please call with any questions or concerns    Thank you for involving us in the care of Ivy Salgado. Please call with any additional concerns or questions.    Paul Loya MD  Gastroenterology Fellow, PGY IV  Pager: [phone]

## 2018-09-02 NOTE — NURSING
Pt medicated with Percocet 10 mg po per request due to complaints of pain to the RLQ. Pt informed transport is here to bring her to Room 906. Pt informed will send a note to Pharmacy to bring Lomotil to new unit due to 1 tab here and 2 tabs are ordered.   Pt states has all personal items. Visitor with pt. Pt ambulated to stretcher with IV fluids in progress and Telemetry monitor in place. 2lnc in place. Fall precautions maintained. No distress noted.  2215 Pt left unit in stretcher with transport and visitor at side. No distress noted

## 2018-09-02 NOTE — NURSING
House supervisor Inga called regarding pts concern for transfer to inpatient unit and pt asked about being transferred to [facility]. Pt is also requesting to speak to a doctor, informed that awaiting return call from [facility]. Russel states to explain to pt that the rooms are semi private at the present and when a private room becomes available, pt will be transferred.    [facility] called on other line, informed of pts current status requesting to be transferred to inpatient unit or [facility] and pt is also requesting to see a doctor. Informed pt is not taking any meds until seen by doctor. Pt called on call light and is complaining of chest pain. Order for  Stat EKG given, read back verified. PA states will see pt in a few minutes

## 2018-09-02 NOTE — PLAN OF CARE
Problem: Patient Care Overview  Goal: Plan of Care Review  Outcome: Ongoing (interventions implemented as appropriate)   09/02/18 1048   Coping/Psychosocial   Plan Of Care Reviewed With patient       Problem: Infection, Risk/Actual (Adult)  Goal: Identify Related Risk Factors and Signs and Symptoms  Related risk factors and signs and symptoms are identified upon initiation of Human Response Clinical Practice Guideline (CPG)  Outcome: Ongoing (interventions implemented as appropriate)   09/01/18 0211   Infection, Risk/Actual   Related Risk Factors (Infection, Risk/Actual) chronic illness/condition;malnutrition;sleep disturbance   Signs and Symptoms (Infection, Risk/Actual) heart rate increase;pain       Problem: Fall Risk (Adult)  Goal: Identify Related Risk Factors and Signs and Symptoms  Related risk factors and signs and symptoms are identified upon initiation of Human Response Clinical Practice Guideline (CPG)  Outcome: Ongoing (interventions implemented as appropriate)   09/02/18 1048   Fall Risk   Related Risk Factors (Fall Risk) culprit medication(s);depression/anxiety;polypharmacy;environment unfamiliar;slipper/uneven surfaces   Signs and Symptoms (Fall Risk) presence of risk factors       Problem: Pain, Acute (Adult)  Goal: Identify Related Risk Factors and Signs and Symptoms  Related risk factors and signs and symptoms are identified upon initiation of Human Response Clinical Practice Guideline (CPG)  Outcome: Ongoing (interventions implemented as appropriate)   08/30/18 1701   Pain, Acute   Related Risk Factors (Acute Pain) disease process;patient perception;persistent pain;positioning   Signs and Symptoms (Acute Pain) verbalization of pain descriptors

## 2018-09-02 NOTE — ASSESSMENT & PLAN NOTE
Chest burning/discomfort  38 y.o. to who presents with burning chest pain with radiation into neck, reports associated dizziness and lightheadedness. Symptoms lasted for ~20 mins. Pain is not reproducible on exam.      Concern for other pathology as pt with recurring episodes of flushing, dizziness, palpitations and increased BP  - EP consulted, appreciate recs  - previous imaging reviewed and adrenals appear stable   - pheochromocytoma labs in process: chromogranin A, serum serotonin; 5-HIAA plasma (neuroendocrine); Catecholamines, fractionated, plasma; Homovanillic acid, urine;  Catecholamines, fractionated, plasma; VMA, urine 24 Hours; Metanephrines, urine 24 Hours; 5 HIAA, quantitative, urine 24 Hours; Metanephrines    - 30-day event monitor at discharge- pt states that she was not having these episodes during the time of the eval  - EKG with sinus tachycardia, 119 BPM  - TSH, T4 wnl  -  troponin negligible   - CXR demonstrating lung zones that appear stable and clear  - 2D ECHO with EF 60-65%  - CBC, CMP (goal Mag>2, K>4) daily  - recent work-up for palpitations with 48 holter-monitor, 4/10/2018  - 30 day event monitor revealed normal sinus rhythm and sinus tachycardia (up to 129 bpm with exercise). There were no significant arrhythmias noted.  - cardiac telemetry

## 2018-09-02 NOTE — NURSING
[facility] returned call, Informed of pts current status and reason for admit. Medications discussed. Stat EKG ordered along with a dose of Ativan 1 mg IVP.

## 2018-09-02 NOTE — PLAN OF CARE
Problem: Patient Care Overview  Goal: Plan of Care Review  Outcome: Ongoing (interventions implemented as appropriate)  POC reviewed with patient; patient verbalizes understanding. 24h Urine initiated. IV fluid therapy maintained. Safety precautions in place; call light within reach, bed in low position, wheels locked, side rails up x2. Will continue to monitor.    Problem: Fall Risk (Adult)  Goal: Identify Related Risk Factors and Signs and Symptoms  Related risk factors and signs and symptoms are identified upon initiation of Human Response Clinical Practice Guideline (CPG)  Outcome: Ongoing (interventions implemented as appropriate)   09/02/18 0311   Fall Risk   Related Risk Factors (Fall Risk) polypharmacy;environment unfamiliar

## 2018-09-02 NOTE — PROGRESS NOTES
[facility] Hospital Medicine  Progress Note    Patient Name: Ivy Salgado  MRN: [id number]  Patient Class: IP- Inpatient   Admission Date: 8/30/2018  Length of Stay: 2 days  Attending Physician: Candice Chamberlain MD  Primary Care Provider: Kalia Astorga MD    [facility] Medicine Team: [facility] HOSP MED E Manish Leung PA-C    Subjective:     Principal Problem:Exacerbation of Crohn's disease    HPI:  Ivy Salgado is a 39yo WF with a PMH of Crohn's disease, anxiety, and HTN presents to the ED with complaints of CP, palpitations, and increased ostomy output. She reports one episode of burning CP last night that lasted roughly 20 minutes and occurred at rest. CP resolved on its own but was associated with diaphoresis, palpitations, dizziness, and SOB. During this episode of CP she reports a HA and L sided tingling in her hand, foot, and face that resolved with the CP. She does endorse some confusion and memory loss during this episode. She states she pulled over when the CP originally began but then decided to keep driving to her boyfriend's place in [place]. However, she does not remember the rest of the drive and arrived to the wrong hotel which she has been going to for years.     She also had another episode of palpitations this morning which prompted her to come to the ED but denies any associated CP. She has also been experiencing increased ostomy output, cramping abdominal pain, and heartburn for the last 2 weeks. She believes her current symptoms are similar to her crohn's flares in the past. She has not been on maintenance therapy for her crohn's since Feb/March due to side effects. She is currently only receiving symptomatic treatment and gets 2L NS IV once a week (Thursdays) to prevent dehydration.      ED: CBC, UA WNL. CMP revealed K+ of 2.9 replaced IV and PO. CXR and CTH unremarkable.    Hospital Course:  Ivy Salgado was admitted due to symptoms believed to be associated with  Crohn's flare. 2D ECHO, EKG and troponin's were wnl. Gastroenterology was consulted and recommended budesonide 9mg daily and MR enterography, results pending. EP consulted for persistent palpitations.      Interval History: Overnight nursing report an episode of extreme anxiety, palpitations, HTN and dizziness. RR team called to bedside, pt evaluated by on-call NP and staff MD.   This morning pt appears relaxed, HR ~80. Facial flushing present however pt comfortable. Plan of care discussed with pt who is agreeable.     Review of Systems   Constitutional: Positive for activity change. Negative for fever.   HENT: Negative for facial swelling.         Facial flushing   Eyes: Positive for visual disturbance (2/2 to stye).   Respiratory: Negative for shortness of breath.    Cardiovascular: Positive for palpitations. Negative for chest pain.   Gastrointestinal: Positive for abdominal pain. Negative for nausea.   Genitourinary: Negative for difficulty urinating and dysuria.   Musculoskeletal: Negative for back pain and gait problem.   Skin: Positive for color change.        Skin flushing   Allergic/Immunologic: Positive for immunocompromised state.   Neurological: Positive for dizziness, tremors and weakness. Negative for syncope.   Psychiatric/Behavioral: Negative for agitation.     Objective:     Vital Signs (Most Recent):  Temp: 99.1 °F (37.3 °C) (09/02/18 0746)  Pulse: 104 (09/02/18 0746)  Resp: 16 (09/02/18 0746)  BP: (!) 135/90 (09/02/18 0746)  SpO2: 100 % (09/02/18 0746) Vital Signs (24h Range):  Temp:  [98.2 °F (36.8 °C)-99.7 °F (37.6 °C)] 99.1 °F (37.3 °C)  Pulse:  [] 104  Resp:  [12-20] 16  SpO2:  [97 %-100 %] 100 %  BP: (125-172)/() 135/90     Weight: 48.1 kg (106 lb 1.6 oz)  Body mass index is 20.05 kg/m².    Intake/Output Summary (Last 24 hours) at 9/2/2018 0927  Last data filed at 9/2/2018 0600  Gross per 24 hour   Intake 240 ml   Output 200 ml   Net 40 ml      Physical Exam   Constitutional: She  is oriented to person, place, and time. She appears well-nourished. No distress.   HENT:   Head: Normocephalic.   Eyes: EOM are normal.   Cardiovascular: Normal rate and regular rhythm.   No murmur heard.  HR ~80   Pulmonary/Chest: Effort normal and breath sounds normal. No respiratory distress.   Abdominal: Soft. She exhibits no distension. There is no tenderness.   Ileostomy in place   Musculoskeletal: Normal range of motion. She exhibits no edema.   Neurological: She is alert and oriented to person, place, and time. No cranial nerve deficit.   Skin: Skin is warm and dry. She is not diaphoretic. There is erythema (facial flushing).   Psychiatric: She has a normal mood and affect.   Nursing note and vitals reviewed.      Significant Labs: All pertinent labs within the past 24 hours have been reviewed.    Significant Imaging: I have reviewed all pertinent imaging results/findings within the past 24 hours.    Assessment/Plan:      * Exacerbation of Crohn's disease    Crohn's Disease  She believes her current symptoms are similar to her crohn's flares in the past. She has not been on maintenance therapy for her crohn's since Feb/March due to side effects.     - GI consulted, appreciate recs   - budesonide 9mg daily   - MR enterography    - loperamide (IMODIUM) 2 mg capsule  - appears non-toxic, afebrile without leukocytosis  - stool studies completed 8/28   - C. Diff, Shigella, stool culture unremarkable  - promethazine (PHENERGAN) 25 MG tablet PRN   - monitor for signs of infection        Palpitations    Chest burning/discomfort  38 y.o. to who presents with burning chest pain with radiation into neck, reports associated dizziness and lightheadedness. Symptoms lasted for ~20 mins. Pain is not reproducible on exam.      Concern for other pathology as pt with recurring episodes of flushing, dizziness, palpitations and increased BP  - EP consulted, appreciate recs  - previous imaging reviewed and adrenals appear  stable   - pheochromocytoma labs in process: chromogranin A, serum serotonin; 5-HIAA plasma (neuroendocrine); Catecholamines, fractionated, plasma; Homovanillic acid, urine;  Catecholamines, fractionated, plasma; VMA, urine 24 Hours; Metanephrines, urine 24 Hours; 5 HIAA, quantitative, urine 24 Hours; Metanephrines    - 30-day event monitor at discharge- pt states that she was not having these episodes during the time of the eval  - EKG with sinus tachycardia, 119 BPM  - TSH, T4 wnl  -  troponin negligible   - CXR demonstrating lung zones that appear stable and clear  - 2D ECHO with EF 60-65%  - CBC, CMP (goal Mag>2, K>4) daily  - recent work-up for palpitations with 48 holter-monitor, 4/10/2018  - 30 day event monitor revealed normal sinus rhythm and sinus tachycardia (up to 129 bpm with exercise). There were no significant arrhythmias noted.  - cardiac telemetry        Hypokalemia    Improved, increased output likely secondary   - continue to monitor daily         Essential hypertension, benign              Herpes genitalis in women    - valACYclovir (VALTREX) 500 MG tablet         Generalized anxiety disorder    - ALPRAZolam (XANAX) 1 MG tablet BID PRN           VTE Risk Mitigation (From admission, onward)        Ordered     IP VTE LOW RISK PATIENT  Once      08/30/18 1332     Place sequential compression device  Until discontinued      08/30/18 1332     IP VTE LOW RISK PATIENT  Once      08/30/18 1332              Manish Leung PA-C  Department of Hospital Medicine   [facility]

## 2018-09-02 NOTE — CARE UPDATE
"  RN Proactive Rounding Note  Time of Visit:     Admit Date: 2018  LOS: 1  Code Status: Full Code   Date of Visit: 2018  : 1980  Age: 38 y.o.  Sex: female  Bed: OBS 03/OBS 03A:   MRN: [id number]  Was the patient discharged from an ICU this admission? No   Was the patient discharged from a PACU within last 24 hours? No  Did the patient receive conscious sedation/general anesthesia in last 24 hours? No  Was the patient in the ED within the past 24 hours? No  Was the patient started on NIPPV within the past 24 hours? No  Attending Physician: Candice Chamberlain MD  Primary Service: [facility] HOSP MED E      ASSESSMENT:     Abnormal Vital Signs: 's-130's  Clinical Issues: Dysrythmia     INTERVENTIONS/ RECOMMENDATIONS:     Charge Nurse called RRN to bedside for patients complaint's of chest palpitations, diaphoretic, and shaking.  Patients states "something is wrong, give do something, will you just give me something."  Upon assessment patient's vital signs , RR 22, Systolic .  Patients also appears to be flushed with red rash.  Patient states rash happens often and she can not have Benadryl.  Primary team paged, stat EKG ordered, 1 mg of IV ativan ordered.  Dose given, patient states that she feels "less shaky now".  Patient's HR 90's to 100's, with unlabored breathing and appears to be resting comfortably upon nurses departure.      Discussed plan of care with MIKE Ritchie RN    PHYSICIAN ESCALATION:     Yes/No YES by primary RN    Orders received and case discussed with Dr Gavino Ga NP    Disposition: Patient to remain in OBS bed 03    FOLLOW-UP/CONTINGENCY:       Call back the Rapid Response Nurse at x [phone]  for additional questions or concerns      "

## 2018-09-02 NOTE — NURSING
Pt medicated with Ativan 1 mg IVP, [facility] and Dr Chamberlain at bedside discussing pts plan of care.

## 2018-09-02 NOTE — NURSING
Rapid Response [facility]ubna called and [facility] paged regarding pts pulse in the 140/150s and pt  in bed shaking with a flushed face. Daytime MIKE Hu at bedside assessing pt.   1926 Accucheck 106.    Awaiting return call from PA and arrival of Rapid RN. Pt informed

## 2018-09-02 NOTE — PROGRESS NOTES
[facility] Hospital Medicine  Progress Note    Patient Name: Ivy Salgado  MRN: [id number]  Patient Class: IP- Inpatient   Admission Date: 8/30/2018  Length of Stay: 1 days  Attending Physician: Candice Chamberlain MD  Primary Care Provider: Kalia Astorga MD    [facility] Medicine Team: [facility] Manish Leung PA-C    Subjective:     Principal Problem:Exacerbation of Crohn's disease    HPI:  Ivy Salgado is a 37yo WF with a PMH of Crohn's disease, anxiety, and HTN presents to the ED with complaints of CP, palpitations, and increased ostomy output. She reports one episode of burning CP last night that lasted roughly 20 minutes and occurred at rest. CP resolved on its own but was associated with diaphoresis, palpitations, dizziness, and SOB. During this episode of CP she reports a HA and L sided tingling in her hand, foot, and face that resolved with the CP. She does endorse some confusion and memory loss during this episode. She states she pulled over when the CP originally began but then decided to keep driving to her boyfriend's place in [place]. However, she does not remember the rest of the drive and arrived to the wrong hotel which she has been going to for years.     She also had another episode of palpitations this morning which prompted her to come to the ED but denies any associated CP. She has also been experiencing increased ostomy output, cramping abdominal pain, and heartburn for the last 2 weeks. She believes her current symptoms are similar to her crohn's flares in the past. She has not been on maintenance therapy for her crohn's since Feb/March due to side effects. She is currently only receiving symptomatic treatment and gets 2L NS IV once a week (Thursdays) to prevent dehydration.      ED: CBC, UA WNL. CMP revealed K+ of 2.9 replaced IV and PO. CXR and CTH unremarkable.    Hospital Course:  Ivy Salgado was admitted due to symptoms believed to be associated with  Crohn's flare. 2D ECHO, EKG and troponin's were wnl. Gastroenterology was consulted and recommended budesonide 9mg daily and MR enterography.     Interval History: Pt resting in bed in anticipation of MRI results. Reports reported episodes of dizziness, palpitaions and flushing.     Review of Systems   Constitutional: Negative for fever.   HENT: Negative for facial swelling.         Facial flushing   Eyes: Positive for visual disturbance (2/2 to stye).   Respiratory: Negative for shortness of breath.    Cardiovascular: Positive for palpitations. Negative for chest pain.   Gastrointestinal: Positive for abdominal pain. Negative for nausea.   Genitourinary: Negative for difficulty urinating and dysuria.   Musculoskeletal: Negative for back pain and gait problem.   Skin: Positive for color change.        Skin flushing   Neurological: Positive for dizziness. Negative for syncope.   Psychiatric/Behavioral: Negative for agitation.     Objective:     Vital Signs (Most Recent):  Temp: 99.1 °F (37.3 °C) (09/01/18 1308)  Pulse: 97 (09/01/18 1308)  Resp: 18 (09/01/18 1308)  BP: 126/86 (09/01/18 1308)  SpO2: 99 % (09/01/18 1308) Vital Signs (24h Range):  Temp:  [96.6 °F (35.9 °C)-99.1 °F (37.3 °C)] 99.1 °F (37.3 °C)  Pulse:  [] 97  Resp:  [12-18] 18  SpO2:  [97 %-100 %] 99 %  BP: (110-175)/(74-86) 126/86     Weight: 48.1 kg (106 lb 1.6 oz)  Body mass index is 20.05 kg/m².    Intake/Output Summary (Last 24 hours) at 9/1/2018 1548  Last data filed at 9/1/2018 0515  Gross per 24 hour   Intake 2363.33 ml   Output 600 ml   Net 1763.33 ml      Physical Exam   Constitutional: She is oriented to person, place, and time. She appears well-nourished. No distress.   HENT:   Head: Normocephalic.   Eyes: EOM are normal.   Cardiovascular: Regular rhythm.   No murmur heard.  tachy   Pulmonary/Chest: Effort normal and breath sounds normal. No respiratory distress.   Abdominal: Soft. She exhibits no distension. There is no tenderness.    Ileostomy in place   Musculoskeletal: Normal range of motion. She exhibits no edema.   Neurological: She is alert and oriented to person, place, and time. No cranial nerve deficit.   Skin: Skin is warm and dry. She is not diaphoretic. There is erythema.   Psychiatric: She has a normal mood and affect.   Nursing note and vitals reviewed.      Significant Labs: All pertinent labs within the past 24 hours have been reviewed.    Significant Imaging: I have reviewed all pertinent imaging results/findings within the past 24 hours.    Assessment/Plan:      * Exacerbation of Crohn's disease    Crohn's Disease  She believes her current symptoms are similar to her crohn's flares in the past. She has not been on maintenance therapy for her crohn's since Feb/March due to side effects.     - GI consulted, appreciate recs   - budesonide 9mg daily   - MR enterography    - loperamide (IMODIUM) 2 mg capsule  - appears non-toxic, afebrile without leukocytosis  - stool studies completed 8/28   - C. Diff, Shigella, stool culture unremarkable  - promethazine (PHENERGAN) 25 MG tablet PRN   - monitor for signs of infection        Palpitations    Chest burning/discomfort  38 y.o. to who presents with burning chest pain with radiation into neck, reports associated dizziness and lightheadedness. Symptoms lasted for ~20 mins. Pain is not reproducible on exam.      Concern for other pathology as pt with recurring episodes of flushing, dizziness, palpitations and increased BP  - previous imaging reviewed and adrenals appear stable   - will order pheochromocytoma labs   - 30-day event monitor at discharge- pt states that she was not having these episodes during the time of the eval  - EKG with sinus tachycardia, 119 BPM  -  troponin 0.006, will trend Q6H  - CXR demonstrating lung zones that appear stable and clear  - 2D ECHO with EF 60-65%  - CBC, CMP (goal Mag>2, K>4) daily  - recent work-up for palpitations with 48 holter-monitor,  4/10/2018  - 30 day event monitor revealed normal sinus rhythm and sinus tachycardia (up to 129 bpm with exercise). There were no significant arrhythmias noted.  - cardiac telemetry        Hypokalemia    Improved, increased output likely secondary   - continue to monitor daily         Essential hypertension, benign              Herpes genitalis in women    - valACYclovir (VALTREX) 500 MG tablet         Generalized anxiety disorder    Last taken yesterday   - ALPRAZolam (XANAX) 1 MG tablet BID PRN           VTE Risk Mitigation (From admission, onward)        Ordered     IP VTE LOW RISK PATIENT  Once      08/30/18 1332     Place sequential compression device  Until discontinued      08/30/18 1332     IP VTE LOW RISK PATIENT  Once      08/30/18 1332              Manish Leung PA-C  Department of Hospital Medicine   [facility]

## 2018-09-02 NOTE — NURSING
"Pt c/o feeling palpitations and feeling "my heart beating out of my chest." pt is aaox3. Vss. resp even and unlabored. Cax=458. Ht rate in the 120's-130's. Pt is clammy and diaphoretic. [facility] notified and rapid response called and rapid response RN at bedside. Orders for stat EKG given and carried out. Will continue to monitor  "

## 2018-09-02 NOTE — NURSING
Notified TYLER BHATT of pt c/o feeling poorly and pain not being relieved by Percocet, awaiting further orders at this time.

## 2018-09-02 NOTE — NURSING
Called to pt room, stated she was anxious and visible trembling noted.  Pt also tachycardic and stated she felt like she was having an anxiety attack.  TYLER BHATT at bedside for exam.  Placed on DASH monitor, NSR noted to monitor.  Pt given Lopressor 5 mg IVP and Lorazepam 1 mg IVP 5 mins apart and symptoms resolved.  See flowsheet for vitals, will continue to monitor

## 2018-09-02 NOTE — NURSING
Gavino BHATT and Dr Chamberlain to floor to see pt.  informed of pts current status since 1900.  and PA entered room to assess and talk to pt. Order for Ativan 1 mg IVP given, read back and verified.

## 2018-09-03 LAB
ALBUMIN SERPL BCP-MCNC: 3.5 G/DL
ALP SERPL-CCNC: 75 U/L
ALT SERPL W/O P-5'-P-CCNC: 8 U/L
ANION GAP SERPL CALC-SCNC: 8 MMOL/L
AST SERPL-CCNC: 13 U/L
BASOPHILS # BLD AUTO: 0.02 K/UL
BASOPHILS NFR BLD: 0.4 %
BILIRUB SERPL-MCNC: 0.4 MG/DL
BUN SERPL-MCNC: 6 MG/DL
CALCIUM SERPL-MCNC: 8.8 MG/DL
CHLORIDE SERPL-SCNC: 109 MMOL/L
CO2 SERPL-SCNC: 23 MMOL/L
CREAT SERPL-MCNC: 0.7 MG/DL
DIFFERENTIAL METHOD: ABNORMAL
EOSINOPHIL # BLD AUTO: 0.2 K/UL
EOSINOPHIL NFR BLD: 2.9 %
ERYTHROCYTE [DISTWIDTH] IN BLOOD BY AUTOMATED COUNT: 13.3 %
EST. GFR  (AFRICAN AMERICAN): >60 ML/MIN/1.73 M^2
EST. GFR  (NON AFRICAN AMERICAN): >60 ML/MIN/1.73 M^2
GLUCOSE SERPL-MCNC: 90 MG/DL
HCT VFR BLD AUTO: 33.8 %
HGB BLD-MCNC: 11.1 G/DL
IMM GRANULOCYTES # BLD AUTO: 0.01 K/UL
IMM GRANULOCYTES NFR BLD AUTO: 0.2 %
LYMPHOCYTES # BLD AUTO: 2.3 K/UL
LYMPHOCYTES NFR BLD: 41.7 %
MAGNESIUM SERPL-MCNC: 1.9 MG/DL
MCH RBC QN AUTO: 29.4 PG
MCHC RBC AUTO-ENTMCNC: 32.8 G/DL
MCV RBC AUTO: 89 FL
MONOCYTES # BLD AUTO: 0.5 K/UL
MONOCYTES NFR BLD: 9 %
NEUTROPHILS # BLD AUTO: 2.5 K/UL
NEUTROPHILS NFR BLD: 45.8 %
NRBC BLD-RTO: 0 /100 WBC
PHOSPHATE SERPL-MCNC: 4 MG/DL
PLATELET # BLD AUTO: 296 K/UL
PMV BLD AUTO: 9.6 FL
POCT GLUCOSE: 98 MG/DL (ref 70–110)
POTASSIUM SERPL-SCNC: 3.5 MMOL/L
PROT SERPL-MCNC: 6.9 G/DL
RBC # BLD AUTO: 3.78 M/UL
SODIUM SERPL-SCNC: 140 MMOL/L
WBC # BLD AUTO: 5.47 K/UL

## 2018-09-03 PROCEDURE — 11000001 HC ACUTE MED/SURG PRIVATE ROOM

## 2018-09-03 PROCEDURE — 80053 COMPREHEN METABOLIC PANEL: CPT

## 2018-09-03 PROCEDURE — 84100 ASSAY OF PHOSPHORUS: CPT

## 2018-09-03 PROCEDURE — 83835 ASSAY OF METANEPHRINES: CPT

## 2018-09-03 PROCEDURE — 83735 ASSAY OF MAGNESIUM: CPT

## 2018-09-03 PROCEDURE — 84585 ASSAY OF URINE VMA: CPT

## 2018-09-03 PROCEDURE — 25000003 PHARM REV CODE 250: Performed by: PHYSICIAN ASSISTANT

## 2018-09-03 PROCEDURE — 63600175 PHARM REV CODE 636 W HCPCS: Performed by: PHYSICIAN ASSISTANT

## 2018-09-03 PROCEDURE — 82384 ASSAY THREE CATECHOLAMINES: CPT

## 2018-09-03 PROCEDURE — 25000003 PHARM REV CODE 250: Performed by: HOSPITALIST

## 2018-09-03 PROCEDURE — 83497 ASSAY OF 5-HIAA: CPT

## 2018-09-03 PROCEDURE — 99233 SBSQ HOSP IP/OBS HIGH 50: CPT | Mod: ,,, | Performed by: PHYSICIAN ASSISTANT

## 2018-09-03 PROCEDURE — 85025 COMPLETE CBC W/AUTO DIFF WBC: CPT

## 2018-09-03 RX ORDER — METOPROLOL TARTRATE 25 MG/1
25 TABLET, FILM COATED ORAL 2 TIMES DAILY PRN
Status: DISCONTINUED | OUTPATIENT
Start: 2018-09-03 | End: 2018-09-06

## 2018-09-03 RX ORDER — METOPROLOL TARTRATE 1 MG/ML
5 INJECTION, SOLUTION INTRAVENOUS ONCE
Status: COMPLETED | OUTPATIENT
Start: 2018-09-03 | End: 2018-09-03

## 2018-09-03 RX ORDER — LORAZEPAM 2 MG/ML
1 INJECTION INTRAMUSCULAR ONCE
Status: COMPLETED | OUTPATIENT
Start: 2018-09-03 | End: 2018-09-03

## 2018-09-03 RX ADMIN — SODIUM CHLORIDE: 0.9 INJECTION, SOLUTION INTRAVENOUS at 11:09

## 2018-09-03 RX ADMIN — DIPHENOXYLATE HYDROCHLORIDE AND ATROPINE SULFATE 1 TABLET: 2.5; .025 TABLET ORAL at 02:09

## 2018-09-03 RX ADMIN — DIPHENOXYLATE HYDROCHLORIDE AND ATROPINE SULFATE 1 TABLET: 2.5; .025 TABLET ORAL at 09:09

## 2018-09-03 RX ADMIN — BUDESONIDE 9 MG: 3 CAPSULE, GELATIN COATED ORAL at 09:09

## 2018-09-03 RX ADMIN — PROMETHAZINE HYDROCHLORIDE 25 MG: 12.5 TABLET ORAL at 04:09

## 2018-09-03 RX ADMIN — METOPROLOL TARTRATE 25 MG: 25 TABLET ORAL at 10:09

## 2018-09-03 RX ADMIN — VALACYCLOVIR 500 MG: 500 TABLET, FILM COATED ORAL at 09:09

## 2018-09-03 RX ADMIN — OXYCODONE HYDROCHLORIDE AND ACETAMINOPHEN 1 TABLET: 10; 325 TABLET ORAL at 04:09

## 2018-09-03 RX ADMIN — ALPRAZOLAM 1 MG: 1 TABLET ORAL at 10:09

## 2018-09-03 RX ADMIN — SODIUM CHLORIDE: 0.9 INJECTION, SOLUTION INTRAVENOUS at 12:09

## 2018-09-03 RX ADMIN — SODIUM CHLORIDE: 0.9 INJECTION, SOLUTION INTRAVENOUS at 03:09

## 2018-09-03 RX ADMIN — LORAZEPAM 1 MG: 2 INJECTION INTRAMUSCULAR; INTRAVENOUS at 02:09

## 2018-09-03 RX ADMIN — DIPHENOXYLATE HYDROCHLORIDE AND ATROPINE SULFATE 1 TABLET: 2.5; .025 TABLET ORAL at 04:09

## 2018-09-03 RX ADMIN — LOPERAMIDE HYDROCHLORIDE 2 MG: 2 CAPSULE ORAL at 09:09

## 2018-09-03 RX ADMIN — ZOLPIDEM TARTRATE 10 MG: 5 TABLET ORAL at 11:09

## 2018-09-03 RX ADMIN — DRONABINOL 2.5 MG: 2.5 CAPSULE ORAL at 04:09

## 2018-09-03 RX ADMIN — DRONABINOL 2.5 MG: 2.5 CAPSULE ORAL at 05:09

## 2018-09-03 RX ADMIN — METOPROLOL TARTRATE 5 MG: 5 INJECTION, SOLUTION INTRAVENOUS at 02:09

## 2018-09-03 RX ADMIN — PROMETHAZINE HYDROCHLORIDE 25 MG: 12.5 TABLET ORAL at 06:09

## 2018-09-03 RX ADMIN — ALPRAZOLAM 1 MG: 1 TABLET ORAL at 08:09

## 2018-09-03 NOTE — PROGRESS NOTES
[facility] Hospital Medicine  Progress Note    Patient Name: Ivy Salgado  MRN: [id number]  Patient Class: IP- Inpatient   Admission Date: 8/30/2018  Length of Stay: 3 days  Attending Physician: Candice Chamberlain MD  Primary Care Provider: Kalia Astorga MD    [facility] Medicine Team: [facility] MED E Manish Leung PA-C    Subjective:     Principal Problem:Exacerbation of Crohn's disease    HPI:  Ivy Salgado is a 39yo WF with a PMH of Crohn's disease, anxiety, and HTN presents to the ED with complaints of CP, palpitations, and increased ostomy output. She reports one episode of burning CP last night that lasted roughly 20 minutes and occurred at rest. CP resolved on its own but was associated with diaphoresis, palpitations, dizziness, and SOB. During this episode of CP she reports a HA and L sided tingling in her hand, foot, and face that resolved with the CP. She does endorse some confusion and memory loss during this episode. She states she pulled over when the CP originally began but then decided to keep driving to her boyfriend's place in [place]. However, she does not remember the rest of the drive and arrived to the wrong hotel which she has been going to for years.     She also had another episode of palpitations this morning which prompted her to come to the ED but denies any associated CP. She has also been experiencing increased ostomy output, cramping abdominal pain, and heartburn for the last 2 weeks. She believes her current symptoms are similar to her crohn's flares in the past. She has not been on maintenance therapy for her crohn's since Feb/March due to side effects. She is currently only receiving symptomatic treatment and gets 2L NS IV once a week (Thursdays) to prevent dehydration.      ED: CBC, UA WNL. CMP revealed K+ of 2.9 replaced IV and PO. CXR and CTH unremarkable.    Hospital Course:  Ivy Salgado was admitted due to symptoms believed to be associated with  Crohn's flare. 2D ECHO, EKG and troponin's were wnl. Gastroenterology was consulted and recommended budesonide 9mg daily and MR enterography, results pending. EP consulted for persistent palpitations.      Interval History: Pt. Resting in bed no complaints at the time of exam.    Nurse later reported pt having another spell with flushing and palpitations. IV Metoprolol administered.     Review of Systems   Constitutional: Positive for activity change. Negative for fever.   HENT: Negative for facial swelling.         Facial flushing   Eyes: Positive for visual disturbance (2/2 to stye).   Respiratory: Negative for shortness of breath.    Cardiovascular: Positive for palpitations. Negative for chest pain.   Gastrointestinal: Positive for abdominal pain. Negative for nausea.   Genitourinary: Negative for difficulty urinating and dysuria.   Musculoskeletal: Negative for back pain and gait problem.   Skin: Positive for color change.        Skin flushing   Allergic/Immunologic: Positive for immunocompromised state.   Neurological: Positive for dizziness, tremors and weakness. Negative for syncope.   Psychiatric/Behavioral: Negative for agitation.     Objective:     Vital Signs (Most Recent):  Temp: 99.9 °F (37.7 °C) (09/03/18 1122)  Pulse: 71 (09/03/18 1513)  Resp: 19 (09/03/18 1446)  BP: 121/83 (09/03/18 1446)  SpO2: 98 % (09/03/18 1446) Vital Signs (24h Range):  Temp:  [98.4 °F (36.9 °C)-99.9 °F (37.7 °C)] 99.9 °F (37.7 °C)  Pulse:  [] 71  Resp:  [17-20] 19  SpO2:  [97 %-99 %] 98 %  BP: (120-145)/(74-96) 121/83     Weight: 48.1 kg (106 lb 1.6 oz)  Body mass index is 20.05 kg/m².    Intake/Output Summary (Last 24 hours) at 9/3/2018 1518  Last data filed at 9/3/2018 0912  Gross per 24 hour   Intake 640 ml   Output 50 ml   Net 590 ml      Physical Exam   Constitutional: She is oriented to person, place, and time. She appears well-nourished. No distress.   HENT:   Head: Normocephalic.   Eyes: EOM are normal.    Cardiovascular: Normal rate and regular rhythm.   No murmur heard.  HR ~80   Pulmonary/Chest: Effort normal and breath sounds normal. No respiratory distress.   Abdominal: Soft. She exhibits no distension. There is no tenderness.   Ileostomy in place   Musculoskeletal: Normal range of motion. She exhibits no edema.   Neurological: She is alert and oriented to person, place, and time. No cranial nerve deficit.   Skin: Skin is warm and dry. She is not diaphoretic. There is erythema (facial flushing).   Psychiatric: She has a normal mood and affect.   Nursing note and vitals reviewed.      Significant Labs: All pertinent labs within the past 24 hours have been reviewed.    Significant Imaging: I have reviewed all pertinent imaging results/findings within the past 24 hours.    Assessment/Plan:      * Exacerbation of Crohn's disease    Crohn's Disease  She believes her current symptoms are similar to her crohn's flares in the past. She has not been on maintenance therapy for her crohn's since Feb/March due to side effects.     - GI consulted, appreciate recs   - budesonide 9mg daily   - MR enterography    - loperamide (IMODIUM) 2 mg capsule  - appears non-toxic, afebrile without leukocytosis  - stool studies completed 8/28   - C. Diff, Shigella, stool culture unremarkable  - promethazine (PHENERGAN) 25 MG tablet PRN   - monitor for signs of infection        Palpitations    Hyperadrenergic spells  Sinus tachycardia  38 y.o. to who presents with burning chest pain with radiation into neck, reports associated dizziness and lightheadedness. Symptoms lasted for ~20 mins. Pain is not reproducible on exam.      Repeated spells since admission, improved with PRN Metoprolol and lorazepam  - Pt reports increased spells since December. Previous cardiac work-up unremarkable however, episodes are persistent. Also reports developing an allergy to items in which she has always consumed, like beer which causes significant generalized  flushing of her upper extremities, chest and face.    - episodes at home treated with PRN benzodiazepines   - previous imaging reviewed and adrenals appear stable   - pheochromocytoma labs in process: chromogranin A, serum serotonin; 5-HIAA plasma (neuroendocrine); Catecholamines, fractionated, plasma; Homovanillic acid, urine;  Catecholamines, fractionated, plasma; VMA, urine 24 Hours; Metanephrines, urine 24 Hours; 5 HIAA, quantitative, urine 24 Hours; Metanephrines   - EP consulted, appreciate recs   - 30-day event monitor at discharge---pt states that she was not having these episodes during the time of the eval  - EKG with sinus tachycardia, 119 BPM  - TSH, T4 wnl  -  troponin negligible   - CXR demonstrating lung zones that appear stable and clear  - 2D ECHO with EF 60-65%  - CBC, CMP (goal Mag>2, K>4) daily  - recent work-up for palpitations with 48 holter-monitor, 4/10/2018  - 30 day event monitor revealed normal sinus rhythm and sinus tachycardia (up to 129 bpm with exercise). There were no significant arrhythmias noted.  - cardiac telemetry        Hypokalemia    Improved, increased output likely secondary   - continue to monitor daily         Essential hypertension, benign              Herpes genitalis in women    - valACYclovir (VALTREX) 500 MG tablet         Generalized anxiety disorder    - ALPRAZolam (XANAX) 1 MG tablet BID PRN           VTE Risk Mitigation (From admission, onward)        Ordered     IP VTE LOW RISK PATIENT  Once      08/30/18 1332     Place sequential compression device  Until discontinued      08/30/18 1332     IP VTE LOW RISK PATIENT  Once      08/30/18 1332              Manish Leung PA-C  Department of Hospital Medicine   [facility]

## 2018-09-03 NOTE — SUBJECTIVE & OBJECTIVE
Interval History: Pt. Resting in bed no complaints at the time of exam.    Nurse later reported pt having another spell with flushing and palpitations. IV Metoprolol administered.     Review of Systems   Constitutional: Positive for activity change. Negative for fever.   HENT: Negative for facial swelling.         Facial flushing   Eyes: Positive for visual disturbance (2/2 to stye).   Respiratory: Negative for shortness of breath.    Cardiovascular: Positive for palpitations. Negative for chest pain.   Gastrointestinal: Positive for abdominal pain. Negative for nausea.   Genitourinary: Negative for difficulty urinating and dysuria.   Musculoskeletal: Negative for back pain and gait problem.   Skin: Positive for color change.        Skin flushing   Allergic/Immunologic: Positive for immunocompromised state.   Neurological: Positive for dizziness, tremors and weakness. Negative for syncope.   Psychiatric/Behavioral: Negative for agitation.     Objective:     Vital Signs (Most Recent):  Temp: 99.9 °F (37.7 °C) (09/03/18 1122)  Pulse: 71 (09/03/18 1513)  Resp: 19 (09/03/18 1446)  BP: 121/83 (09/03/18 1446)  SpO2: 98 % (09/03/18 1446) Vital Signs (24h Range):  Temp:  [98.4 °F (36.9 °C)-99.9 °F (37.7 °C)] 99.9 °F (37.7 °C)  Pulse:  [] 71  Resp:  [17-20] 19  SpO2:  [97 %-99 %] 98 %  BP: (120-145)/(74-96) 121/83     Weight: 48.1 kg (106 lb 1.6 oz)  Body mass index is 20.05 kg/m².    Intake/Output Summary (Last 24 hours) at 9/3/2018 1518  Last data filed at 9/3/2018 0912  Gross per 24 hour   Intake 640 ml   Output 50 ml   Net 590 ml      Physical Exam   Constitutional: She is oriented to person, place, and time. She appears well-nourished. No distress.   HENT:   Head: Normocephalic.   Eyes: EOM are normal.   Cardiovascular: Normal rate and regular rhythm.   No murmur heard.  HR ~80   Pulmonary/Chest: Effort normal and breath sounds normal. No respiratory distress.   Abdominal: Soft. She exhibits no distension. There  is no tenderness.   Ileostomy in place   Musculoskeletal: Normal range of motion. She exhibits no edema.   Neurological: She is alert and oriented to person, place, and time. No cranial nerve deficit.   Skin: Skin is warm and dry. She is not diaphoretic. There is erythema (facial flushing).   Psychiatric: She has a normal mood and affect.   Nursing note and vitals reviewed.      Significant Labs: All pertinent labs within the past 24 hours have been reviewed.    Significant Imaging: I have reviewed all pertinent imaging results/findings within the past 24 hours.

## 2018-09-03 NOTE — NURSING
Pt c/o anxiety and rapid heart rate, denies CP, denies abd pain, reports shakiness and exhibits noticeable tremors.  ST to monitor, vitals otherwise stable.  Pt requested Alprazolam and Toprol PO, which were given.  TYLER BHATT notified.

## 2018-09-03 NOTE — ASSESSMENT & PLAN NOTE
Hyperadrenergic spells  Sinus tachycardia  38 y.o. to who presents with burning chest pain with radiation into neck, reports associated dizziness and lightheadedness. Symptoms lasted for ~20 mins. Pain is not reproducible on exam.      Repeated spells since admission, improved with PRN Metoprolol and lorazepam  - Pt reports increased spells since December. Previous cardiac work-up unremarkable however, episodes are persistent. Also reports developing an allergy to items in which she has always consumed, like beer which causes significant generalized flushing of her upper extremities, chest and face.    - episodes at home treated with PRN benzodiazepines   - previous imaging reviewed and adrenals appear stable   - pheochromocytoma labs in process: chromogranin A, serum serotonin; 5-HIAA plasma (neuroendocrine); Catecholamines, fractionated, plasma; Homovanillic acid, urine;  Catecholamines, fractionated, plasma; VMA, urine 24 Hours; Metanephrines, urine 24 Hours; 5 HIAA, quantitative, urine 24 Hours; Metanephrines   - EP consulted, appreciate recs   - 30-day event monitor at discharge---pt states that she was not having these episodes during the time of the eval  - EKG with sinus tachycardia, 119 BPM  - TSH, T4 wnl  -  troponin negligible   - CXR demonstrating lung zones that appear stable and clear  - 2D ECHO with EF 60-65%  - CBC, CMP (goal Mag>2, K>4) daily  - recent work-up for palpitations with 48 holter-monitor, 4/10/2018  - 30 day event monitor revealed normal sinus rhythm and sinus tachycardia (up to 129 bpm with exercise). There were no significant arrhythmias noted.  - cardiac telemetry

## 2018-09-04 ENCOUNTER — PATIENT MESSAGE (OUTPATIENT)
Dept: GASTROENTEROLOGY | Facility: CLINIC | Age: 38
End: 2018-09-04

## 2018-09-04 ENCOUNTER — TELEPHONE (OUTPATIENT)
Dept: GASTROENTEROLOGY | Facility: CLINIC | Age: 38
End: 2018-09-04

## 2018-09-04 LAB
ALBUMIN SERPL BCP-MCNC: 3.3 G/DL
ALP SERPL-CCNC: 67 U/L
ALT SERPL W/O P-5'-P-CCNC: 6 U/L
ANION GAP SERPL CALC-SCNC: 8 MMOL/L
AST SERPL-CCNC: 11 U/L
BACTERIA BLD CULT: NORMAL
BACTERIA BLD CULT: NORMAL
BASOPHILS # BLD AUTO: 0.03 K/UL
BASOPHILS NFR BLD: 0.6 %
BILIRUB SERPL-MCNC: 0.7 MG/DL
BUN SERPL-MCNC: 4 MG/DL
CALCIUM SERPL-MCNC: 8.6 MG/DL
CHLORIDE SERPL-SCNC: 107 MMOL/L
CO2 SERPL-SCNC: 23 MMOL/L
CREAT SERPL-MCNC: 0.6 MG/DL
DIFFERENTIAL METHOD: ABNORMAL
EOSINOPHIL # BLD AUTO: 0.1 K/UL
EOSINOPHIL NFR BLD: 2.5 %
ERYTHROCYTE [DISTWIDTH] IN BLOOD BY AUTOMATED COUNT: 13.3 %
EST. GFR  (AFRICAN AMERICAN): >60 ML/MIN/1.73 M^2
EST. GFR  (NON AFRICAN AMERICAN): >60 ML/MIN/1.73 M^2
GLUCOSE SERPL-MCNC: 79 MG/DL
HCT VFR BLD AUTO: 32.4 %
HGB BLD-MCNC: 10.8 G/DL
IMM GRANULOCYTES # BLD AUTO: 0 K/UL
IMM GRANULOCYTES NFR BLD AUTO: 0 %
LYMPHOCYTES # BLD AUTO: 2.3 K/UL
LYMPHOCYTES NFR BLD: 43.1 %
MAGNESIUM SERPL-MCNC: 1.5 MG/DL
MCH RBC QN AUTO: 30.1 PG
MCHC RBC AUTO-ENTMCNC: 33.3 G/DL
MCV RBC AUTO: 90 FL
MONOCYTES # BLD AUTO: 0.6 K/UL
MONOCYTES NFR BLD: 10.9 %
NEUTROPHILS # BLD AUTO: 2.3 K/UL
NEUTROPHILS NFR BLD: 42.9 %
NRBC BLD-RTO: 0 /100 WBC
PHOSPHATE SERPL-MCNC: 3.3 MG/DL
PLATELET # BLD AUTO: 276 K/UL
PMV BLD AUTO: 9.8 FL
POTASSIUM SERPL-SCNC: 3.3 MMOL/L
PROT SERPL-MCNC: 6.4 G/DL
RBC # BLD AUTO: 3.59 M/UL
SODIUM SERPL-SCNC: 138 MMOL/L
WBC # BLD AUTO: 5.24 K/UL

## 2018-09-04 PROCEDURE — 25000003 PHARM REV CODE 250: Performed by: PHYSICIAN ASSISTANT

## 2018-09-04 PROCEDURE — 99233 SBSQ HOSP IP/OBS HIGH 50: CPT | Mod: ,,, | Performed by: PHYSICIAN ASSISTANT

## 2018-09-04 PROCEDURE — 36415 COLL VENOUS BLD VENIPUNCTURE: CPT

## 2018-09-04 PROCEDURE — 63600175 PHARM REV CODE 636 W HCPCS: Performed by: HOSPITALIST

## 2018-09-04 PROCEDURE — 83735 ASSAY OF MAGNESIUM: CPT

## 2018-09-04 PROCEDURE — 11000001 HC ACUTE MED/SURG PRIVATE ROOM

## 2018-09-04 PROCEDURE — 80053 COMPREHEN METABOLIC PANEL: CPT

## 2018-09-04 PROCEDURE — 84100 ASSAY OF PHOSPHORUS: CPT

## 2018-09-04 PROCEDURE — 83835 ASSAY OF METANEPHRINES: CPT

## 2018-09-04 PROCEDURE — 82542 COL CHROMOTOGRAPHY QUAL/QUAN: CPT

## 2018-09-04 PROCEDURE — 82384 ASSAY THREE CATECHOLAMINES: CPT

## 2018-09-04 PROCEDURE — 25000003 PHARM REV CODE 250: Performed by: HOSPITALIST

## 2018-09-04 PROCEDURE — 85025 COMPLETE CBC W/AUTO DIFF WBC: CPT

## 2018-09-04 PROCEDURE — 63600175 PHARM REV CODE 636 W HCPCS: Performed by: PHYSICIAN ASSISTANT

## 2018-09-04 RX ORDER — LORAZEPAM 1 MG/1
2 TABLET ORAL EVERY 6 HOURS PRN
Status: DISCONTINUED | OUTPATIENT
Start: 2018-09-04 | End: 2018-09-07 | Stop reason: HOSPADM

## 2018-09-04 RX ORDER — POTASSIUM CHLORIDE 20 MEQ/1
40 TABLET, EXTENDED RELEASE ORAL EVERY 4 HOURS
Status: COMPLETED | OUTPATIENT
Start: 2018-09-04 | End: 2018-09-04

## 2018-09-04 RX ORDER — LORAZEPAM 2 MG/ML
2 INJECTION INTRAMUSCULAR ONCE
Status: COMPLETED | OUTPATIENT
Start: 2018-09-04 | End: 2018-09-04

## 2018-09-04 RX ORDER — LANOLIN ALCOHOL/MO/W.PET/CERES
800 CREAM (GRAM) TOPICAL 2 TIMES DAILY
Status: COMPLETED | OUTPATIENT
Start: 2018-09-04 | End: 2018-09-05

## 2018-09-04 RX ORDER — DRONABINOL 2.5 MG/1
5 CAPSULE ORAL
Status: DISCONTINUED | OUTPATIENT
Start: 2018-09-04 | End: 2018-09-07 | Stop reason: HOSPADM

## 2018-09-04 RX ORDER — DRONABINOL 2.5 MG/1
5 CAPSULE ORAL
Status: DISCONTINUED | OUTPATIENT
Start: 2018-09-05 | End: 2018-09-04

## 2018-09-04 RX ORDER — MIDAZOLAM HYDROCHLORIDE 5 MG/ML
4 INJECTION INTRAMUSCULAR; INTRAVENOUS
Status: DISCONTINUED | OUTPATIENT
Start: 2018-09-04 | End: 2018-09-07 | Stop reason: HOSPADM

## 2018-09-04 RX ADMIN — OXYCODONE HYDROCHLORIDE AND ACETAMINOPHEN 1 TABLET: 10; 325 TABLET ORAL at 05:09

## 2018-09-04 RX ADMIN — POTASSIUM CHLORIDE 40 MEQ: 1500 TABLET, EXTENDED RELEASE ORAL at 09:09

## 2018-09-04 RX ADMIN — DIPHENOXYLATE HYDROCHLORIDE AND ATROPINE SULFATE 1 TABLET: 2.5; .025 TABLET ORAL at 02:09

## 2018-09-04 RX ADMIN — LOPERAMIDE HYDROCHLORIDE 2 MG: 2 CAPSULE ORAL at 08:09

## 2018-09-04 RX ADMIN — PROMETHAZINE HYDROCHLORIDE 25 MG: 12.5 TABLET ORAL at 05:09

## 2018-09-04 RX ADMIN — POTASSIUM CHLORIDE 40 MEQ: 1500 TABLET, EXTENDED RELEASE ORAL at 02:09

## 2018-09-04 RX ADMIN — DRONABINOL 2.5 MG: 2.5 CAPSULE ORAL at 06:09

## 2018-09-04 RX ADMIN — SODIUM CHLORIDE: 0.9 INJECTION, SOLUTION INTRAVENOUS at 10:09

## 2018-09-04 RX ADMIN — DRONABINOL 5 MG: 2.5 CAPSULE ORAL at 05:09

## 2018-09-04 RX ADMIN — MAGNESIUM OXIDE TAB 400 MG (241.3 MG ELEMENTAL MG) 800 MG: 400 (241.3 MG) TAB at 08:09

## 2018-09-04 RX ADMIN — VALACYCLOVIR 500 MG: 500 TABLET, FILM COATED ORAL at 08:09

## 2018-09-04 RX ADMIN — LORAZEPAM 2 MG: 1 TABLET ORAL at 10:09

## 2018-09-04 RX ADMIN — BUDESONIDE 9 MG: 3 CAPSULE, GELATIN COATED ORAL at 08:09

## 2018-09-04 RX ADMIN — LORAZEPAM 2 MG: 2 INJECTION, SOLUTION INTRAMUSCULAR; INTRAVENOUS at 07:09

## 2018-09-04 RX ADMIN — RANITIDINE 150 MG: 15 SYRUP ORAL at 02:09

## 2018-09-04 RX ADMIN — DIPHENOXYLATE HYDROCHLORIDE AND ATROPINE SULFATE 1 TABLET: 2.5; .025 TABLET ORAL at 08:09

## 2018-09-04 RX ADMIN — ALPRAZOLAM 1 MG: 1 TABLET ORAL at 02:09

## 2018-09-04 RX ADMIN — DIPHENOXYLATE HYDROCHLORIDE AND ATROPINE SULFATE 1 TABLET: 2.5; .025 TABLET ORAL at 05:09

## 2018-09-04 NOTE — PLAN OF CARE
BRAN was informed by NOVA Leung that the patient will need a 30 day event monitor prior to discharge. BRAN was informed by Kezia with the arrhythmia clinic that there are no 30 day event monitors available. Appointment scheduled for the patient to  a 30 day event monitor in the arrhythmia clinic on 9/6/18 at 1000. BRAN informed Manish of above. Will continue to follow.

## 2018-09-04 NOTE — PLAN OF CARE
09/04/18 1233   Discharge Reassessment   Assessment Type Discharge Planning Reassessment   Do you have any problems affording any of your prescribed medications? No   Discharge Plan A Home with family   Discharge Plan B Home Health   Can the patient answer the patient profile reliably? Yes, cognitively intact   How does the patient rate their overall health at the present time? Fair   Describe the patient's ability to walk at the present time. No restrictions   How often would a person be available to care for the patient? Whenever needed   Number of comorbid conditions (as recorded on the chart) Five or more     Patient continues to be followed by GI for Crohn's exacerbation. Will continue to follow.

## 2018-09-04 NOTE — NURSING
AAOx4.  No complaints of chest pain or shortness of breath.  Anxiety medications given several times this shift; however, patient remained free of anxiety episodes this shift.  Discussed plan of care.  At 0445 - explained and discussed special lab work which was being withdraw from port.  Remained free from falls or incidents this shift.

## 2018-09-04 NOTE — PLAN OF CARE
Problem: Fall Risk (Adult)  Goal: Absence of Falls  Patient will demonstrate the desired outcomes by discharge/transition of care.   09/04/18 0650   Fall Risk (Adult)   Absence of Falls making progress toward outcome

## 2018-09-05 ENCOUNTER — TELEPHONE (OUTPATIENT)
Dept: GASTROENTEROLOGY | Facility: CLINIC | Age: 38
End: 2018-09-05

## 2018-09-05 ENCOUNTER — PATIENT MESSAGE (OUTPATIENT)
Dept: GASTROENTEROLOGY | Facility: CLINIC | Age: 38
End: 2018-09-05

## 2018-09-05 LAB
ALBUMIN SERPL BCP-MCNC: 3.5 G/DL
ALP SERPL-CCNC: 76 U/L
ALT SERPL W/O P-5'-P-CCNC: 9 U/L
ANION GAP SERPL CALC-SCNC: 9 MMOL/L
AST SERPL-CCNC: 14 U/L
BASOPHILS # BLD AUTO: 0.04 K/UL
BASOPHILS NFR BLD: 0.8 %
BILIRUB SERPL-MCNC: 0.2 MG/DL
BUN SERPL-MCNC: 7 MG/DL
CALCIUM SERPL-MCNC: 9.5 MG/DL
CHLORIDE SERPL-SCNC: 109 MMOL/L
CO2 SERPL-SCNC: 23 MMOL/L
CREAT SERPL-MCNC: 0.7 MG/DL
DIFFERENTIAL METHOD: ABNORMAL
EOSINOPHIL # BLD AUTO: 0.1 K/UL
EOSINOPHIL NFR BLD: 2.6 %
ERYTHROCYTE [DISTWIDTH] IN BLOOD BY AUTOMATED COUNT: 13.5 %
EST. GFR  (AFRICAN AMERICAN): >60 ML/MIN/1.73 M^2
EST. GFR  (NON AFRICAN AMERICAN): >60 ML/MIN/1.73 M^2
GLUCOSE SERPL-MCNC: 95 MG/DL
HCT VFR BLD AUTO: 36.7 %
HGB BLD-MCNC: 12.1 G/DL
IMM GRANULOCYTES # BLD AUTO: 0.01 K/UL
IMM GRANULOCYTES NFR BLD AUTO: 0.2 %
LYMPHOCYTES # BLD AUTO: 2 K/UL
LYMPHOCYTES NFR BLD: 39.3 %
MAGNESIUM SERPL-MCNC: 2.1 MG/DL
MCH RBC QN AUTO: 29.9 PG
MCHC RBC AUTO-ENTMCNC: 33 G/DL
MCV RBC AUTO: 91 FL
MONOCYTES # BLD AUTO: 0.6 K/UL
MONOCYTES NFR BLD: 12 %
NEUTROPHILS # BLD AUTO: 2.3 K/UL
NEUTROPHILS NFR BLD: 45.1 %
NRBC BLD-RTO: 0 /100 WBC
PHOSPHATE SERPL-MCNC: 4.6 MG/DL
PLATELET # BLD AUTO: 302 K/UL
PMV BLD AUTO: 10.2 FL
POTASSIUM SERPL-SCNC: 3.7 MMOL/L
PROT SERPL-MCNC: 7.1 G/DL
RBC # BLD AUTO: 4.05 M/UL
SODIUM SERPL-SCNC: 141 MMOL/L
WBC # BLD AUTO: 4.99 K/UL

## 2018-09-05 PROCEDURE — 11000001 HC ACUTE MED/SURG PRIVATE ROOM

## 2018-09-05 PROCEDURE — 84100 ASSAY OF PHOSPHORUS: CPT

## 2018-09-05 PROCEDURE — A9577 INJ MULTIHANCE: HCPCS | Performed by: HOSPITALIST

## 2018-09-05 PROCEDURE — 63600175 PHARM REV CODE 636 W HCPCS: Performed by: HOSPITALIST

## 2018-09-05 PROCEDURE — 25500020 PHARM REV CODE 255: Performed by: HOSPITALIST

## 2018-09-05 PROCEDURE — 83735 ASSAY OF MAGNESIUM: CPT

## 2018-09-05 PROCEDURE — 85025 COMPLETE CBC W/AUTO DIFF WBC: CPT

## 2018-09-05 PROCEDURE — 80053 COMPREHEN METABOLIC PANEL: CPT

## 2018-09-05 PROCEDURE — 63600175 PHARM REV CODE 636 W HCPCS: Performed by: PHYSICIAN ASSISTANT

## 2018-09-05 PROCEDURE — 25000003 PHARM REV CODE 250: Performed by: HOSPITALIST

## 2018-09-05 PROCEDURE — 36415 COLL VENOUS BLD VENIPUNCTURE: CPT

## 2018-09-05 PROCEDURE — 25000003 PHARM REV CODE 250: Performed by: PHYSICIAN ASSISTANT

## 2018-09-05 PROCEDURE — 99233 SBSQ HOSP IP/OBS HIGH 50: CPT | Mod: ,,, | Performed by: PHYSICIAN ASSISTANT

## 2018-09-05 RX ADMIN — LORAZEPAM 2 MG: 1 TABLET ORAL at 08:09

## 2018-09-05 RX ADMIN — MAGNESIUM OXIDE TAB 400 MG (241.3 MG ELEMENTAL MG) 800 MG: 400 (241.3 MG) TAB at 09:09

## 2018-09-05 RX ADMIN — ALTEPLASE 2 MG: 2.2 INJECTION, POWDER, LYOPHILIZED, FOR SOLUTION INTRAVENOUS at 06:09

## 2018-09-05 RX ADMIN — DIPHENOXYLATE HYDROCHLORIDE AND ATROPINE SULFATE 1 TABLET: 2.5; .025 TABLET ORAL at 09:09

## 2018-09-05 RX ADMIN — RANITIDINE 150 MG: 15 SYRUP ORAL at 07:09

## 2018-09-05 RX ADMIN — OXYCODONE HYDROCHLORIDE AND ACETAMINOPHEN 1 TABLET: 10; 325 TABLET ORAL at 09:09

## 2018-09-05 RX ADMIN — PROMETHAZINE HYDROCHLORIDE 25 MG: 12.5 TABLET ORAL at 12:09

## 2018-09-05 RX ADMIN — DIPHENOXYLATE HYDROCHLORIDE AND ATROPINE SULFATE 1 TABLET: 2.5; .025 TABLET ORAL at 05:09

## 2018-09-05 RX ADMIN — BUDESONIDE 9 MG: 3 CAPSULE, GELATIN COATED ORAL at 08:09

## 2018-09-05 RX ADMIN — DIPHENOXYLATE HYDROCHLORIDE AND ATROPINE SULFATE 1 TABLET: 2.5; .025 TABLET ORAL at 08:09

## 2018-09-05 RX ADMIN — OXYCODONE HYDROCHLORIDE AND ACETAMINOPHEN 1 TABLET: 10; 325 TABLET ORAL at 05:09

## 2018-09-05 RX ADMIN — DRONABINOL 5 MG: 2.5 CAPSULE ORAL at 05:09

## 2018-09-05 RX ADMIN — LOPERAMIDE HYDROCHLORIDE 2 MG: 2 CAPSULE ORAL at 08:09

## 2018-09-05 RX ADMIN — LORAZEPAM 2 MG: 1 TABLET ORAL at 05:09

## 2018-09-05 RX ADMIN — ZOLPIDEM TARTRATE 10 MG: 5 TABLET ORAL at 12:09

## 2018-09-05 RX ADMIN — LORAZEPAM 2 MG: 1 TABLET ORAL at 09:09

## 2018-09-05 RX ADMIN — PROMETHAZINE HYDROCHLORIDE 25 MG: 12.5 TABLET ORAL at 05:09

## 2018-09-05 RX ADMIN — OXYCODONE HYDROCHLORIDE AND ACETAMINOPHEN 1 TABLET: 10; 325 TABLET ORAL at 12:09

## 2018-09-05 RX ADMIN — GADOBENATE DIMEGLUMINE 20 ML: 529 INJECTION, SOLUTION INTRAVENOUS at 12:09

## 2018-09-05 RX ADMIN — MAGNESIUM OXIDE TAB 400 MG (241.3 MG ELEMENTAL MG) 800 MG: 400 (241.3 MG) TAB at 08:09

## 2018-09-05 RX ADMIN — VALACYCLOVIR 500 MG: 500 TABLET, FILM COATED ORAL at 08:09

## 2018-09-05 RX ADMIN — PROMETHAZINE HYDROCHLORIDE 25 MG: 12.5 TABLET ORAL at 09:09

## 2018-09-05 RX ADMIN — MIDAZOLAM 4 MG: 5 INJECTION INTRAMUSCULAR; INTRAVENOUS at 11:09

## 2018-09-05 RX ADMIN — DIPHENOXYLATE HYDROCHLORIDE AND ATROPINE SULFATE 1 TABLET: 2.5; .025 TABLET ORAL at 12:09

## 2018-09-05 RX ADMIN — ZOLPIDEM TARTRATE 10 MG: 5 TABLET ORAL at 09:09

## 2018-09-05 NOTE — TELEPHONE ENCOUNTER
Appointment scheduled for patient to see Dr.James Ross on 9/12 for 2:30.  Will mail confirmation.

## 2018-09-05 NOTE — PROGRESS NOTES
[facility] Hospital Medicine  Progress Note    Patient Name: Ivy Salgado  MRN: [id number]  Patient Class: IP- Inpatient   Admission Date: 8/30/2018  Length of Stay: 5 days  Attending Physician: Candice Chamberlain MD  Primary Care Provider: Kalia Astorga MD    [facility] Medicine Team: [facility]OSP MED E Manish Leung PA-C    Subjective:     Principal Problem:Crohn's disease of small intestine    HPI:  Ivy Salgado is a 39yo WF with a PMH of Crohn's disease, anxiety, and HTN presents to the ED with complaints of CP, palpitations, and increased ostomy output. She reports one episode of burning CP last night that lasted roughly 20 minutes and occurred at rest. CP resolved on its own but was associated with diaphoresis, palpitations, dizziness, and SOB. During this episode of CP she reports a HA and L sided tingling in her hand, foot, and face that resolved with the CP. She does endorse some confusion and memory loss during this episode. She states she pulled over when the CP originally began but then decided to keep driving to her boyfriend's place in [place]. However, she does not remember the rest of the drive and arrived to the wrong hotel which she has been going to for years.     She also had another episode of palpitations this morning which prompted her to come to the ED but denies any associated CP. She has also been experiencing increased ostomy output, cramping abdominal pain, and heartburn for the last 2 weeks. She believes her current symptoms are similar to her crohn's flares in the past. She has not been on maintenance therapy for her crohn's since Feb/March due to side effects. She is currently only receiving symptomatic treatment and gets 2L NS IV once a week (Thursdays) to prevent dehydration.      ED: CBC, UA WNL. CMP revealed K+ of 2.9 replaced IV and PO. CXR and CTH unremarkable.    Hospital Course:  Ivy Salgado was admitted due to symptoms believed to be associated with  Crohn's flare. 2D ECHO, EKG and troponin's were wnl. Gastroenterology was consulted and recommended budesonide 9mg daily and MR enterography, results pending. EP consulted for persistent palpitations, recommended PRN beta blocker. Pt with continued hyperadrenergic spells of unknown origin. Cardiac MRI today.     Interval History: Pt resting in bed, anxious in anticipation of MRI.     Review of Systems   Constitutional: Positive for activity change. Negative for fever.   HENT: Negative for facial swelling.         Facial flushing   Eyes: Positive for visual disturbance (2/2 to stye).   Respiratory: Negative for shortness of breath.    Cardiovascular: Positive for palpitations. Negative for chest pain.   Gastrointestinal: Positive for abdominal pain. Negative for nausea.   Genitourinary: Negative for difficulty urinating and dysuria.   Musculoskeletal: Negative for back pain and gait problem.   Skin: Positive for color change.        Skin flushing   Allergic/Immunologic: Positive for immunocompromised state.   Neurological: Positive for dizziness, tremors and weakness. Negative for syncope.   Psychiatric/Behavioral: Negative for agitation.     Objective:     Vital Signs (Most Recent):  Temp: 97.1 °F (36.2 °C) (09/05/18 1312)  Pulse: 104 (09/05/18 1312)  Resp: 15 (09/05/18 1312)  BP: 119/77 (09/05/18 1312)  SpO2: 98 % (09/05/18 1312) Vital Signs (24h Range):  Temp:  [97.1 °F (36.2 °C)-99.4 °F (37.4 °C)] 97.1 °F (36.2 °C)  Pulse:  [] 104  Resp:  [14-20] 15  SpO2:  [94 %-100 %] 98 %  BP: (107-140)/(70-80) 119/77     Weight: 48.1 kg (106 lb 1.6 oz)  Body mass index is 20.05 kg/m².    Intake/Output Summary (Last 24 hours) at 9/5/2018 1533  Last data filed at 9/5/2018 1300  Gross per 24 hour   Intake 360 ml   Output 875 ml   Net -515 ml      Physical Exam   Constitutional: She is oriented to person, place, and time. She appears well-nourished. No distress.   HENT:   Head: Normocephalic.   Eyes: EOM are normal.    Cardiovascular: Normal rate and regular rhythm.   No murmur heard.  HR ~80   Pulmonary/Chest: Effort normal and breath sounds normal. No respiratory distress.   Abdominal: Soft. She exhibits no distension. There is no tenderness.   Ileostomy in place   Musculoskeletal: Normal range of motion. She exhibits no edema.   Neurological: She is alert and oriented to person, place, and time. No cranial nerve deficit.   Skin: Skin is warm and dry. She is not diaphoretic. There is erythema (improved).   Psychiatric: She has a normal mood and affect.   Nursing note and vitals reviewed.      Significant Labs: All pertinent labs within the past 24 hours have been reviewed.    Significant Imaging: I have reviewed all pertinent imaging results/findings within the past 24 hours.    Assessment/Plan:      * Crohn's disease of small intestine    Crohn's Disease  She believes her current symptoms are similar to her crohn's flares in the past. She has not been on maintenance therapy for her crohn's since Feb/March due to side effects.   - MRE without signs of active inflammation   - GI consulted, appreciate recs   - budesonide 9mg daily   - MR enterography without active inflammation   - loperamide (IMODIUM) 2 mg capsule  - appears non-toxic, afebrile without leukocytosis  - stool studies completed 8/28   - C. Diff, Shigella, stool culture unremarkable  - promethazine (PHENERGAN) 25 MG tablet PRN   - monitor for signs of infection        Palpitations    Hyperadrenergic spells  Sinus tachycardia  38 y.o. to who presents with burning chest pain with radiation into neck, reports associated dizziness and lightheadedness. Symptoms lasted for ~20 mins. Pain is not reproducible on exam.      IV fluids dc'd, encourage PO intake  - Cardiac MRI scheduled for today--- Versed 4mg to be administered for anxiety  - Repeated spells since admission, improved with PRN Metoprolol and lorazepam  - Pt reports increased spells since December. Previous  cardiac work-up unremarkable however, episodes are persistent. Also reports developing an allergy to items in which she has always consumed, like beer which causes significant generalized flushing of her upper extremities, chest and face.    - episodes at home treated with PRN benzodiazepines   - previous imaging reviewed and adrenals appear stable   - pheochromocytoma labs in process: chromogranin A, serum serotonin; 5-HIAA plasma (neuroendocrine); Catecholamines, fractionated, plasma; Homovanillic acid, urine;  Catecholamines, fractionated, plasma; VMA, urine 24 Hours; Metanephrines, urine 24 Hours; 5 HIAA, quantitative, urine 24 Hours; Metanephrines   - EP consulted, appreciate recs   - 30-day event monitor at discharge---pt states that she was not having these episodes during the time of the eval  - TSH, T4 wnl, troponin neg   - CXR demonstrating lung zones that appear stable and clear  - 2D ECHO with EF 60-65%  - CBC, CMP (goal Mag>2, K>4) daily  - recent work-up for palpitations with 48 holter-monitor, 4/10/2018  - 30 day event monitor revealed normal sinus rhythm and sinus tachycardia (up to 129 bpm with exercise). There were no significant arrhythmias noted.  - cardiac telemetry        Hypokalemia    Improved, increased output likely secondary   - continue to monitor daily         Essential hypertension, benign              Herpes genitalis in women    - valACYclovir (VALTREX) 500 MG tablet         Generalized anxiety disorder    - ALPRAZolam (XANAX) 1 MG tablet BID PRN           VTE Risk Mitigation (From admission, onward)        Ordered     IP VTE LOW RISK PATIENT  Once      08/30/18 1332     Place sequential compression device  Until discontinued      08/30/18 1332     IP VTE LOW RISK PATIENT  Once      08/30/18 1332              Manish Leung PA-C  Department of Hospital Medicine   [facility]

## 2018-09-05 NOTE — PLAN OF CARE
CM was informed by NOVA Leung ([id number]) that the patient is scheduled to have a cardiac MRI today & will probably discharge home tomorrow. CM informed Nithin ([id number]) with the cardiac clinic of above. Will continue to follow.

## 2018-09-05 NOTE — PROGRESS NOTES
[facility] Hospital Medicine  Progress Note    Patient Name: Ivy Salgado  MRN: [id number]  Patient Class: IP- Inpatient   Admission Date: 8/30/2018  Length of Stay: 4 days  Attending Physician: Candice Chamberlain MD  Primary Care Provider: Kalia Astorga MD    [facility] Medicine Team: [facility]OSP MED E Manish Leung PA-C    Subjective:     Principal Problem:Crohn's disease of small intestine    HPI:  Ivy Salgado is a 37yo WF with a PMH of Crohn's disease, anxiety, and HTN presents to the ED with complaints of CP, palpitations, and increased ostomy output. She reports one episode of burning CP last night that lasted roughly 20 minutes and occurred at rest. CP resolved on its own but was associated with diaphoresis, palpitations, dizziness, and SOB. During this episode of CP she reports a HA and L sided tingling in her hand, foot, and face that resolved with the CP. She does endorse some confusion and memory loss during this episode. She states she pulled over when the CP originally began but then decided to keep driving to her boyfriend's place in [place]. However, she does not remember the rest of the drive and arrived to the wrong hotel which she has been going to for years.     She also had another episode of palpitations this morning which prompted her to come to the ED but denies any associated CP. She has also been experiencing increased ostomy output, cramping abdominal pain, and heartburn for the last 2 weeks. She believes her current symptoms are similar to her crohn's flares in the past. She has not been on maintenance therapy for her crohn's since Feb/March due to side effects. She is currently only receiving symptomatic treatment and gets 2L NS IV once a week (Thursdays) to prevent dehydration.      ED: CBC, UA WNL. CMP revealed K+ of 2.9 replaced IV and PO. CXR and CTH unremarkable.    Hospital Course:  Ivy Salgado was admitted due to symptoms believed to be associated with  Crohn's flare. 2D ECHO, EKG and troponin's were wnl. Gastroenterology was consulted and recommended budesonide 9mg daily and MR enterography, results pending. EP consulted for persistent palpitations, recommended PRN beta blocker. Pt with continued hyperadrenergic spells of unknown origin, will continue to assess.    Interval History: Pt resting in bed, friend at bedside. She reports a mild episode earlier today.     Review of Systems   Constitutional: Positive for activity change. Negative for fever.   HENT: Negative for facial swelling.         Facial flushing   Eyes: Positive for visual disturbance (2/2 to stye).   Respiratory: Negative for shortness of breath.    Cardiovascular: Positive for palpitations. Negative for chest pain.   Gastrointestinal: Positive for abdominal pain. Negative for nausea.   Genitourinary: Negative for difficulty urinating and dysuria.   Musculoskeletal: Negative for back pain and gait problem.   Skin: Positive for color change.        Skin flushing   Allergic/Immunologic: Positive for immunocompromised state.   Neurological: Positive for dizziness, tremors and weakness. Negative for syncope.   Psychiatric/Behavioral: Negative for agitation.     Objective:     Vital Signs (Most Recent):  Temp: 98.6 °F (37 °C) (09/04/18 1607)  Pulse: (!) 162 (09/04/18 1900)  Resp: 14 (09/04/18 1607)  BP: 120/70 (09/04/18 1607)  SpO2: 97 % (09/04/18 1607) Vital Signs (24h Range):  Temp:  [98.1 °F (36.7 °C)-99.4 °F (37.4 °C)] 98.6 °F (37 °C)  Pulse:  [] 162  Resp:  [14-18] 14  SpO2:  [96 %-99 %] 97 %  BP: (118-150)/(70-96) 120/70     Weight: 48.1 kg (106 lb 1.6 oz)  Body mass index is 20.05 kg/m².    Intake/Output Summary (Last 24 hours) at 9/4/2018 1950  Last data filed at 9/4/2018 1800  Gross per 24 hour   Intake --   Output 400 ml   Net -400 ml      Physical Exam   Constitutional: She is oriented to person, place, and time. She appears well-nourished. No distress.   HENT:   Head: Normocephalic.    Eyes: EOM are normal.   Cardiovascular: Normal rate and regular rhythm.   No murmur heard.  HR ~80   Pulmonary/Chest: Effort normal and breath sounds normal. No respiratory distress.   Abdominal: Soft. She exhibits no distension. There is no tenderness.   Ileostomy in place   Musculoskeletal: Normal range of motion. She exhibits no edema.   Neurological: She is alert and oriented to person, place, and time. No cranial nerve deficit.   Skin: Skin is warm and dry. She is not diaphoretic. There is erythema (facial flushing).   Psychiatric: She has a normal mood and affect.   Nursing note and vitals reviewed.      Significant Labs: All pertinent labs within the past 24 hours have been reviewed.    Significant Imaging: I have reviewed all pertinent imaging results/findings within the past 24 hours.    Assessment/Plan:      * Crohn's disease of small intestine    Crohn's Disease  She believes her current symptoms are similar to her crohn's flares in the past. She has not been on maintenance therapy for her crohn's since Feb/March due to side effects.   - MRE without signs of active inflammation   - GI consulted, appreciate recs   - budesonide 9mg daily   - MR enterography    - loperamide (IMODIUM) 2 mg capsule  - appears non-toxic, afebrile without leukocytosis  - stool studies completed 8/28   - C. Diff, Shigella, stool culture unremarkable  - promethazine (PHENERGAN) 25 MG tablet PRN   - monitor for signs of infection        Palpitations    Hyperadrenergic spells  Sinus tachycardia  38 y.o. to who presents with burning chest pain with radiation into neck, reports associated dizziness and lightheadedness. Symptoms lasted for ~20 mins. Pain is not reproducible on exam.      IV fluids dc'd, encourage PO intake  - Cardiac MRI scheduled for tomorrow--- Versed 4mg to be administered for anxiety  - Repeated spells since admission, improved with PRN Metoprolol and lorazepam  - Pt reports increased spells since December.  Previous cardiac work-up unremarkable however, episodes are persistent. Also reports developing an allergy to items in which she has always consumed, like beer which causes significant generalized flushing of her upper extremities, chest and face.    - episodes at home treated with PRN benzodiazepines   - previous imaging reviewed and adrenals appear stable   - pheochromocytoma labs in process: chromogranin A, serum serotonin; 5-HIAA plasma (neuroendocrine); Catecholamines, fractionated, plasma; Homovanillic acid, urine;  Catecholamines, fractionated, plasma; VMA, urine 24 Hours; Metanephrines, urine 24 Hours; 5 HIAA, quantitative, urine 24 Hours; Metanephrines   - EP consulted, appreciate recs   - 30-day event monitor at discharge---pt states that she was not having these episodes during the time of the eval  - TSH, T4 wnl, troponin neg   - CXR demonstrating lung zones that appear stable and clear  - 2D ECHO with EF 60-65%  - CBC, CMP (goal Mag>2, K>4) daily  - recent work-up for palpitations with 48 holter-monitor, 4/10/2018  - 30 day event monitor revealed normal sinus rhythm and sinus tachycardia (up to 129 bpm with exercise). There were no significant arrhythmias noted.  - cardiac telemetry        Hypokalemia    Improved, increased output likely secondary   - continue to monitor daily         Essential hypertension, benign              Herpes genitalis in women    - valACYclovir (VALTREX) 500 MG tablet         Generalized anxiety disorder    - ALPRAZolam (XANAX) 1 MG tablet BID PRN           VTE Risk Mitigation (From admission, onward)        Ordered     IP VTE LOW RISK PATIENT  Once      08/30/18 1332     Place sequential compression device  Until discontinued      08/30/18 1332     IP VTE LOW RISK PATIENT  Once      08/30/18 1332              Manish Leung PA-C  Department of Hospital Medicine   [facility]

## 2018-09-05 NOTE — SUBJECTIVE & OBJECTIVE
Interval History: Pt resting in bed, anxious in anticipation of MRI.     Review of Systems   Constitutional: Positive for activity change. Negative for fever.   HENT: Negative for facial swelling.         Facial flushing   Eyes: Positive for visual disturbance (2/2 to stye).   Respiratory: Negative for shortness of breath.    Cardiovascular: Positive for palpitations. Negative for chest pain.   Gastrointestinal: Positive for abdominal pain. Negative for nausea.   Genitourinary: Negative for difficulty urinating and dysuria.   Musculoskeletal: Negative for back pain and gait problem.   Skin: Positive for color change.        Skin flushing   Allergic/Immunologic: Positive for immunocompromised state.   Neurological: Positive for dizziness, tremors and weakness. Negative for syncope.   Psychiatric/Behavioral: Negative for agitation.     Objective:     Vital Signs (Most Recent):  Temp: 97.1 °F (36.2 °C) (09/05/18 1312)  Pulse: 104 (09/05/18 1312)  Resp: 15 (09/05/18 1312)  BP: 119/77 (09/05/18 1312)  SpO2: 98 % (09/05/18 1312) Vital Signs (24h Range):  Temp:  [97.1 °F (36.2 °C)-99.4 °F (37.4 °C)] 97.1 °F (36.2 °C)  Pulse:  [] 104  Resp:  [14-20] 15  SpO2:  [94 %-100 %] 98 %  BP: (107-140)/(70-80) 119/77     Weight: 48.1 kg (106 lb 1.6 oz)  Body mass index is 20.05 kg/m².    Intake/Output Summary (Last 24 hours) at 9/5/2018 1533  Last data filed at 9/5/2018 1300  Gross per 24 hour   Intake 360 ml   Output 875 ml   Net -515 ml      Physical Exam   Constitutional: She is oriented to person, place, and time. She appears well-nourished. No distress.   HENT:   Head: Normocephalic.   Eyes: EOM are normal.   Cardiovascular: Normal rate and regular rhythm.   No murmur heard.  HR ~80   Pulmonary/Chest: Effort normal and breath sounds normal. No respiratory distress.   Abdominal: Soft. She exhibits no distension. There is no tenderness.   Ileostomy in place   Musculoskeletal: Normal range of motion. She exhibits no edema.    Neurological: She is alert and oriented to person, place, and time. No cranial nerve deficit.   Skin: Skin is warm and dry. She is not diaphoretic. There is erythema (improved).   Psychiatric: She has a normal mood and affect.   Nursing note and vitals reviewed.      Significant Labs: All pertinent labs within the past 24 hours have been reviewed.    Significant Imaging: I have reviewed all pertinent imaging results/findings within the past 24 hours.

## 2018-09-05 NOTE — SUBJECTIVE & OBJECTIVE
Interval History: Pt resting in bed, friend at bedside. She reports a mild episode earlier today.     Review of Systems   Constitutional: Positive for activity change. Negative for fever.   HENT: Negative for facial swelling.         Facial flushing   Eyes: Positive for visual disturbance (2/2 to stye).   Respiratory: Negative for shortness of breath.    Cardiovascular: Positive for palpitations. Negative for chest pain.   Gastrointestinal: Positive for abdominal pain. Negative for nausea.   Genitourinary: Negative for difficulty urinating and dysuria.   Musculoskeletal: Negative for back pain and gait problem.   Skin: Positive for color change.        Skin flushing   Allergic/Immunologic: Positive for immunocompromised state.   Neurological: Positive for dizziness, tremors and weakness. Negative for syncope.   Psychiatric/Behavioral: Negative for agitation.     Objective:     Vital Signs (Most Recent):  Temp: 98.6 °F (37 °C) (09/04/18 1607)  Pulse: (!) 162 (09/04/18 1900)  Resp: 14 (09/04/18 1607)  BP: 120/70 (09/04/18 1607)  SpO2: 97 % (09/04/18 1607) Vital Signs (24h Range):  Temp:  [98.1 °F (36.7 °C)-99.4 °F (37.4 °C)] 98.6 °F (37 °C)  Pulse:  [] 162  Resp:  [14-18] 14  SpO2:  [96 %-99 %] 97 %  BP: (118-150)/(70-96) 120/70     Weight: 48.1 kg (106 lb 1.6 oz)  Body mass index is 20.05 kg/m².    Intake/Output Summary (Last 24 hours) at 9/4/2018 1950  Last data filed at 9/4/2018 1800  Gross per 24 hour   Intake --   Output 400 ml   Net -400 ml      Physical Exam   Constitutional: She is oriented to person, place, and time. She appears well-nourished. No distress.   HENT:   Head: Normocephalic.   Eyes: EOM are normal.   Cardiovascular: Normal rate and regular rhythm.   No murmur heard.  HR ~80   Pulmonary/Chest: Effort normal and breath sounds normal. No respiratory distress.   Abdominal: Soft. She exhibits no distension. There is no tenderness.   Ileostomy in place   Musculoskeletal: Normal range of motion.  She exhibits no edema.   Neurological: She is alert and oriented to person, place, and time. No cranial nerve deficit.   Skin: Skin is warm and dry. She is not diaphoretic. There is erythema (facial flushing).   Psychiatric: She has a normal mood and affect.   Nursing note and vitals reviewed.      Significant Labs: All pertinent labs within the past 24 hours have been reviewed.    Significant Imaging: I have reviewed all pertinent imaging results/findings within the past 24 hours.

## 2018-09-05 NOTE — PLAN OF CARE
Episodes of palpitations/flushing/tachycardia/severe distress, increasing in intensity and severity.  Witnessed by me.   - Cardiac MRI tomorrow to eval for cardiac mass.  I discussed with Dr Sobeida Garcia.   - Called pt to discuss - severe anxiety/panic 2/2 prior claustrophobia/panic in MRI in Feb, giving Ativan 2mg IV STAT, changed her PRN xanax to Ativan 2 PO PRN, and ordered Versed 4mg IV on call for MRI tomorrow.    - 30 day event monitor upon d/c (since she states she did not have any similar episodes during prior 30 day monitor)  - BB and benzo PRN    Anxiety about medical condition and claustrophobia  - Xochitl BHATT and I to do in depth chart review and discuss with patient, as she may need better long acting control of anxiety with SSRI or SNRI to help minimize benzo need. Benzo withdrawal in between doses (especially with xanax, the most short acting benzo) likely worsening anxiety.

## 2018-09-05 NOTE — TELEPHONE ENCOUNTER
----- Message from Parish Ross MD sent at 9/4/2018  2:40 PM CDT -----  Contact: [place] [id number]  Will be working on  ----- Message -----  From: Belinda Sullivan MA  Sent: 9/4/2018   1:15 PM  To: Parish Ross MD    Do you want to work her in somewhere?    ----- Message -----  From: Kamryn Vela  Sent: 8/31/2018  12:00 PM  To: Cody Ross - Khadijah called to schedule a hospital discharge f/u appt for the pt- pt has chron's - please contact pt at [phone]

## 2018-09-05 NOTE — ASSESSMENT & PLAN NOTE
Crohn's Disease  She believes her current symptoms are similar to her crohn's flares in the past. She has not been on maintenance therapy for her crohn's since Feb/March due to side effects.   - MRE without signs of active inflammation   - GI consulted, appreciate recs   - budesonide 9mg daily   - MR enterography    - loperamide (IMODIUM) 2 mg capsule  - appears non-toxic, afebrile without leukocytosis  - stool studies completed 8/28   - C. Diff, Shigella, stool culture unremarkable  - promethazine (PHENERGAN) 25 MG tablet PRN   - monitor for signs of infection

## 2018-09-05 NOTE — ASSESSMENT & PLAN NOTE
Hyperadrenergic spells  Sinus tachycardia  38 y.o. to who presents with burning chest pain with radiation into neck, reports associated dizziness and lightheadedness. Symptoms lasted for ~20 mins. Pain is not reproducible on exam.      IV fluids dc'd, encourage PO intake  - Cardiac MRI scheduled for tomorrow--- Versed 4mg to be administered for anxiety  - Repeated spells since admission, improved with PRN Metoprolol and lorazepam  - Pt reports increased spells since December. Previous cardiac work-up unremarkable however, episodes are persistent. Also reports developing an allergy to items in which she has always consumed, like beer which causes significant generalized flushing of her upper extremities, chest and face.    - episodes at home treated with PRN benzodiazepines   - previous imaging reviewed and adrenals appear stable   - pheochromocytoma labs in process: chromogranin A, serum serotonin; 5-HIAA plasma (neuroendocrine); Catecholamines, fractionated, plasma; Homovanillic acid, urine;  Catecholamines, fractionated, plasma; VMA, urine 24 Hours; Metanephrines, urine 24 Hours; 5 HIAA, quantitative, urine 24 Hours; Metanephrines   - EP consulted, appreciate recs   - 30-day event monitor at discharge---pt states that she was not having these episodes during the time of the eval  - TSH, T4 wnl, troponin neg   - CXR demonstrating lung zones that appear stable and clear  - 2D ECHO with EF 60-65%  - CBC, CMP (goal Mag>2, K>4) daily  - recent work-up for palpitations with 48 holter-monitor, 4/10/2018  - 30 day event monitor revealed normal sinus rhythm and sinus tachycardia (up to 129 bpm with exercise). There were no significant arrhythmias noted.  - cardiac telemetry

## 2018-09-05 NOTE — PLAN OF CARE
Problem: Patient Care Overview  Goal: Plan of Care Review  Outcome: Ongoing (interventions implemented as appropriate)   09/05/18 0333   Coping/Psychosocial   Plan Of Care Reviewed With patient       Problem: Fall Risk (Adult)  Goal: Absence of Falls  Patient will demonstrate the desired outcomes by discharge/transition of care.  Outcome: Ongoing (interventions implemented as appropriate)   09/04/18 0650   Fall Risk (Adult)   Absence of Falls making progress toward outcome

## 2018-09-05 NOTE — PROGRESS NOTES
Patient has order of versed  5 mg/ml for MRI  Exam, accessed pyxis for patient's nurse Dot, 10 mg/5ml pulled for to administers as prescribed.

## 2018-09-05 NOTE — ASSESSMENT & PLAN NOTE
Crohn's Disease  She believes her current symptoms are similar to her crohn's flares in the past. She has not been on maintenance therapy for her crohn's since Feb/March due to side effects.   - MRE without signs of active inflammation   - GI consulted, appreciate recs   - budesonide 9mg daily   - MR enterography without active inflammation   - loperamide (IMODIUM) 2 mg capsule  - appears non-toxic, afebrile without leukocytosis  - stool studies completed 8/28   - C. Diff, Shigella, stool culture unremarkable  - promethazine (PHENERGAN) 25 MG tablet PRN   - monitor for signs of infection

## 2018-09-05 NOTE — ASSESSMENT & PLAN NOTE
Hyperadrenergic spells  Sinus tachycardia  38 y.o. to who presents with burning chest pain with radiation into neck, reports associated dizziness and lightheadedness. Symptoms lasted for ~20 mins. Pain is not reproducible on exam.      IV fluids dc'd, encourage PO intake  - Cardiac MRI scheduled for today--- Versed 4mg to be administered for anxiety  - Repeated spells since admission, improved with PRN Metoprolol and lorazepam  - Pt reports increased spells since December. Previous cardiac work-up unremarkable however, episodes are persistent. Also reports developing an allergy to items in which she has always consumed, like beer which causes significant generalized flushing of her upper extremities, chest and face.    - episodes at home treated with PRN benzodiazepines   - previous imaging reviewed and adrenals appear stable   - pheochromocytoma labs in process: chromogranin A, serum serotonin; 5-HIAA plasma (neuroendocrine); Catecholamines, fractionated, plasma; Homovanillic acid, urine;  Catecholamines, fractionated, plasma; VMA, urine 24 Hours; Metanephrines, urine 24 Hours; 5 HIAA, quantitative, urine 24 Hours; Metanephrines   - EP consulted, appreciate recs   - 30-day event monitor at discharge---pt states that she was not having these episodes during the time of the eval  - TSH, T4 wnl, troponin neg   - CXR demonstrating lung zones that appear stable and clear  - 2D ECHO with EF 60-65%  - CBC, CMP (goal Mag>2, K>4) daily  - recent work-up for palpitations with 48 holter-monitor, 4/10/2018  - 30 day event monitor revealed normal sinus rhythm and sinus tachycardia (up to 129 bpm with exercise). There were no significant arrhythmias noted.  - cardiac telemetry

## 2018-09-05 NOTE — PLAN OF CARE
Voicemail message left for Nithin ([id number]) with the cardiac clinic questioning if a 30 day event monitor is available for patient discharging today after her cardiac MRI. Awaiting return call. Will continue to follow.

## 2018-09-06 ENCOUNTER — TELEPHONE (OUTPATIENT)
Dept: GASTROENTEROLOGY | Facility: CLINIC | Age: 38
End: 2018-09-06

## 2018-09-06 ENCOUNTER — PATIENT MESSAGE (OUTPATIENT)
Dept: INTERNAL MEDICINE | Facility: CLINIC | Age: 38
End: 2018-09-06

## 2018-09-06 LAB
ALBUMIN SERPL BCP-MCNC: 3.4 G/DL
ALP SERPL-CCNC: 71 U/L
ALT SERPL W/O P-5'-P-CCNC: 8 U/L
ANION GAP SERPL CALC-SCNC: 8 MMOL/L
AST SERPL-CCNC: 12 U/L
BASOPHILS # BLD AUTO: 0.02 K/UL
BASOPHILS NFR BLD: 0.3 %
BILIRUB SERPL-MCNC: 0.3 MG/DL
BUN SERPL-MCNC: 14 MG/DL
CALCIUM SERPL-MCNC: 9 MG/DL
CHLORIDE SERPL-SCNC: 106 MMOL/L
CO2 SERPL-SCNC: 26 MMOL/L
CREAT SERPL-MCNC: 0.7 MG/DL
DIFFERENTIAL METHOD: ABNORMAL
EOSINOPHIL # BLD AUTO: 0.1 K/UL
EOSINOPHIL NFR BLD: 1.9 %
ERYTHROCYTE [DISTWIDTH] IN BLOOD BY AUTOMATED COUNT: 13.6 %
EST. GFR  (AFRICAN AMERICAN): >60 ML/MIN/1.73 M^2
EST. GFR  (NON AFRICAN AMERICAN): >60 ML/MIN/1.73 M^2
GLUCOSE SERPL-MCNC: 95 MG/DL
HCT VFR BLD AUTO: 34 %
HGB BLD-MCNC: 11.1 G/DL
IMM GRANULOCYTES # BLD AUTO: 0.01 K/UL
IMM GRANULOCYTES NFR BLD AUTO: 0.2 %
LYMPHOCYTES # BLD AUTO: 2.4 K/UL
LYMPHOCYTES NFR BLD: 41.3 %
MAGNESIUM SERPL-MCNC: 2 MG/DL
MCH RBC QN AUTO: 29.4 PG
MCHC RBC AUTO-ENTMCNC: 32.6 G/DL
MCV RBC AUTO: 90 FL
METANEPH FREE SERPL-MCNC: 48 PG/ML
METANEPHS SERPL-MCNC: 90 PG/ML
MONOCYTES # BLD AUTO: 0.7 K/UL
MONOCYTES NFR BLD: 11.1 %
NEUTROPHILS # BLD AUTO: 2.6 K/UL
NEUTROPHILS NFR BLD: 45.2 %
NORMETANEPH FREE SERPL-MCNC: 42 PG/ML
NRBC BLD-RTO: 0 /100 WBC
PHOSPHATE SERPL-MCNC: 5.3 MG/DL
PLATELET # BLD AUTO: 286 K/UL
PMV BLD AUTO: 9.5 FL
POTASSIUM SERPL-SCNC: 3.7 MMOL/L
PROT SERPL-MCNC: 6.9 G/DL
RBC # BLD AUTO: 3.78 M/UL
SEROTONIN: 44 NG/ML
SODIUM SERPL-SCNC: 140 MMOL/L
WBC # BLD AUTO: 5.83 K/UL

## 2018-09-06 PROCEDURE — 25000003 PHARM REV CODE 250: Performed by: PHYSICIAN ASSISTANT

## 2018-09-06 PROCEDURE — 63600175 PHARM REV CODE 636 W HCPCS: Performed by: PHYSICIAN ASSISTANT

## 2018-09-06 PROCEDURE — 83735 ASSAY OF MAGNESIUM: CPT

## 2018-09-06 PROCEDURE — 84100 ASSAY OF PHOSPHORUS: CPT

## 2018-09-06 PROCEDURE — 85025 COMPLETE CBC W/AUTO DIFF WBC: CPT

## 2018-09-06 PROCEDURE — 80053 COMPREHEN METABOLIC PANEL: CPT

## 2018-09-06 PROCEDURE — 11000001 HC ACUTE MED/SURG PRIVATE ROOM

## 2018-09-06 PROCEDURE — 25000003 PHARM REV CODE 250: Performed by: HOSPITALIST

## 2018-09-06 PROCEDURE — 99233 SBSQ HOSP IP/OBS HIGH 50: CPT | Mod: ,,, | Performed by: PHYSICIAN ASSISTANT

## 2018-09-06 RX ORDER — METOPROLOL TARTRATE 25 MG/1
25 TABLET, FILM COATED ORAL 3 TIMES DAILY PRN
Status: DISCONTINUED | OUTPATIENT
Start: 2018-09-06 | End: 2018-09-07 | Stop reason: HOSPADM

## 2018-09-06 RX ORDER — OXYCODONE AND ACETAMINOPHEN 10; 325 MG/1; MG/1
1 TABLET ORAL EVERY 4 HOURS PRN
Status: DISCONTINUED | OUTPATIENT
Start: 2018-09-06 | End: 2018-09-07 | Stop reason: HOSPADM

## 2018-09-06 RX ORDER — METOPROLOL TARTRATE 25 MG/1
25 TABLET, FILM COATED ORAL 2 TIMES DAILY
Status: DISCONTINUED | OUTPATIENT
Start: 2018-09-06 | End: 2018-09-06

## 2018-09-06 RX ORDER — LOPERAMIDE HYDROCHLORIDE 2 MG/1
2 CAPSULE ORAL
Status: DISCONTINUED | OUTPATIENT
Start: 2018-09-06 | End: 2018-09-07 | Stop reason: HOSPADM

## 2018-09-06 RX ORDER — METOPROLOL SUCCINATE 25 MG/1
25 TABLET, EXTENDED RELEASE ORAL 2 TIMES DAILY
Status: DISCONTINUED | OUTPATIENT
Start: 2018-09-06 | End: 2018-09-07 | Stop reason: HOSPADM

## 2018-09-06 RX ADMIN — PROMETHAZINE HYDROCHLORIDE 25 MG: 12.5 TABLET ORAL at 05:09

## 2018-09-06 RX ADMIN — SODIUM CHLORIDE 1000 ML: 0.9 INJECTION, SOLUTION INTRAVENOUS at 08:09

## 2018-09-06 RX ADMIN — ZOLPIDEM TARTRATE 10 MG: 5 TABLET ORAL at 08:09

## 2018-09-06 RX ADMIN — OXYCODONE HYDROCHLORIDE AND ACETAMINOPHEN 1 TABLET: 10; 325 TABLET ORAL at 05:09

## 2018-09-06 RX ADMIN — LOPERAMIDE HYDROCHLORIDE 2 MG: 2 CAPSULE ORAL at 04:09

## 2018-09-06 RX ADMIN — DIPHENOXYLATE HYDROCHLORIDE AND ATROPINE SULFATE 1 TABLET: 2.5; .025 TABLET ORAL at 04:09

## 2018-09-06 RX ADMIN — OXYCODONE HYDROCHLORIDE AND ACETAMINOPHEN 1 TABLET: 10; 325 TABLET ORAL at 10:09

## 2018-09-06 RX ADMIN — RANITIDINE 150 MG: 15 SYRUP ORAL at 10:09

## 2018-09-06 RX ADMIN — SODIUM CHLORIDE 1000 ML: 0.9 INJECTION, SOLUTION INTRAVENOUS at 02:09

## 2018-09-06 RX ADMIN — LORAZEPAM 2 MG: 1 TABLET ORAL at 05:09

## 2018-09-06 RX ADMIN — LORAZEPAM 2 MG: 1 TABLET ORAL at 01:09

## 2018-09-06 RX ADMIN — DIPHENOXYLATE HYDROCHLORIDE AND ATROPINE SULFATE 1 TABLET: 2.5; .025 TABLET ORAL at 08:09

## 2018-09-06 RX ADMIN — PROMETHAZINE HYDROCHLORIDE 25 MG: 12.5 TABLET ORAL at 10:09

## 2018-09-06 RX ADMIN — BUDESONIDE 9 MG: 3 CAPSULE, GELATIN COATED ORAL at 08:09

## 2018-09-06 RX ADMIN — VALACYCLOVIR 500 MG: 500 TABLET, FILM COATED ORAL at 08:09

## 2018-09-06 RX ADMIN — LORAZEPAM 2 MG: 1 TABLET ORAL at 08:09

## 2018-09-06 RX ADMIN — DRONABINOL 5 MG: 2.5 CAPSULE ORAL at 04:09

## 2018-09-06 RX ADMIN — METOPROLOL TARTRATE 25 MG: 25 TABLET ORAL at 12:09

## 2018-09-06 RX ADMIN — OXYCODONE HYDROCHLORIDE AND ACETAMINOPHEN 1 TABLET: 10; 325 TABLET ORAL at 12:09

## 2018-09-06 RX ADMIN — LOPERAMIDE HYDROCHLORIDE 2 MG: 2 CAPSULE ORAL at 08:09

## 2018-09-06 RX ADMIN — DIPHENOXYLATE HYDROCHLORIDE AND ATROPINE SULFATE 1 TABLET: 2.5; .025 TABLET ORAL at 12:09

## 2018-09-06 RX ADMIN — METOPROLOL SUCCINATE 25 MG: 25 TABLET, EXTENDED RELEASE ORAL at 08:09

## 2018-09-06 RX ADMIN — DRONABINOL 5 MG: 2.5 CAPSULE ORAL at 05:09

## 2018-09-06 NOTE — SUBJECTIVE & OBJECTIVE
Interval History: Pt without spells since Tuesday afternoon. On exam, resting heart rate usually greater than 90, will schedule beta-blocker instead of PRN dosing.     Review of Systems   Constitutional: Positive for activity change. Negative for fever.   HENT: Negative for facial swelling.         Facial flushing   Eyes: Negative for visual disturbance (2/2 to stye).   Respiratory: Negative for shortness of breath.    Cardiovascular: Positive for palpitations. Negative for chest pain.   Gastrointestinal: Positive for abdominal pain. Negative for nausea.   Genitourinary: Negative for difficulty urinating and dysuria.   Musculoskeletal: Negative for back pain and gait problem.   Skin: Positive for color change.        Skin flushing   Allergic/Immunologic: Positive for immunocompromised state.   Neurological: Positive for dizziness, tremors and weakness. Negative for syncope.   Psychiatric/Behavioral: Negative for agitation.     Objective:     Vital Signs (Most Recent):  Temp: 99 °F (37.2 °C) (09/06/18 1123)  Pulse: (!) 117 (09/06/18 1123)  Resp: 20 (09/06/18 1123)  BP: (!) 146/77 (09/06/18 1123)  SpO2: 99 % (09/06/18 1123) Vital Signs (24h Range):  Temp:  [97.1 °F (36.2 °C)-99 °F (37.2 °C)] 99 °F (37.2 °C)  Pulse:  [] 117  Resp:  [14-20] 20  SpO2:  [93 %-100 %] 99 %  BP: (102-152)/(60-88) 146/77     Weight: 48.1 kg (106 lb 1.6 oz)  Body mass index is 20.05 kg/m².    Intake/Output Summary (Last 24 hours) at 9/6/2018 1137  Last data filed at 9/6/2018 0545  Gross per 24 hour   Intake 240 ml   Output 800 ml   Net -560 ml      Physical Exam   Constitutional: She is oriented to person, place, and time. She appears well-nourished. No distress.   HENT:   Head: Normocephalic.   Eyes: EOM are normal.   Cardiovascular: Normal rate and regular rhythm.   No murmur heard.  HR ~95   Pulmonary/Chest: Effort normal and breath sounds normal. No respiratory distress.   Abdominal: Soft. She exhibits no distension. There is no  tenderness.   Ileostomy in place   Musculoskeletal: Normal range of motion. She exhibits no edema.   Neurological: She is alert and oriented to person, place, and time. No cranial nerve deficit.   Skin: Skin is warm and dry. She is not diaphoretic. There is erythema (improved).   Psychiatric: She has a normal mood and affect.   Nursing note and vitals reviewed.      Significant Labs: All pertinent labs within the past 24 hours have been reviewed.    Significant Imaging: I have reviewed all pertinent imaging results/findings within the past 24 hours.

## 2018-09-06 NOTE — PLAN OF CARE
Problem: Patient Care Overview  Goal: Plan of Care Review  Outcome: Ongoing (interventions implemented as appropriate)   09/06/18 0416   Coping/Psychosocial   Plan Of Care Reviewed With patient       Problem: Fall Risk (Adult)  Goal: Absence of Falls  Patient will demonstrate the desired outcomes by discharge/transition of care.  Outcome: Ongoing (interventions implemented as appropriate)   09/06/18 0416   Fall Risk (Adult)   Absence of Falls making progress toward outcome

## 2018-09-06 NOTE — ASSESSMENT & PLAN NOTE
Hyperadrenergic spells  Sinus tachycardia  38 y.o. to who presents with burning chest pain with radiation into neck, reports associated dizziness and lightheadedness. Symptoms lasted for ~20 mins. Pain is not reproducible on exam.      Fluids today, usually receives 2L on Thursdays/week  - scheduled Metoprolol 25mg BID   - Cardiac MRI scheduled for 9/5 was without abnormality  - Pt reports increased spells since December. Previous cardiac work-up unremarkable however, episodes are persistent. Also reports developing an allergy to items in which she has always consumed, like beer which causes significant generalized flushing of her upper extremities, chest and face.    - episodes at home treated with PRN benzodiazepines   - previous imaging reviewed and adrenals appear stable   - pheochromocytoma labs in process: chromogranin A, serum serotonin; 5-HIAA plasma (neuroendocrine); Catecholamines, fractionated, plasma; Homovanillic acid, urine;  Catecholamines, fractionated, plasma; VMA, urine 24 Hours; Metanephrines, urine 24 Hours; 5 HIAA, quantitative, urine 24 Hours; Metanephrines   - EP consulted, appreciate recs   - 30-day event monitor at discharge---pt states that she was not having these episodes during the time of the eval  - TSH, T4 wnl, troponin neg   - CXR demonstrating lung zones that appear stable and clear  - 2D ECHO with EF 60-65%  - CBC, CMP (goal Mag>2, K>4) daily  - recent work-up for palpitations with 48 holter-monitor, 4/10/2018  - 30 day event monitor revealed normal sinus rhythm and sinus tachycardia (up to 129 bpm with exercise). There were no significant arrhythmias noted.  - cardiac telemetry

## 2018-09-06 NOTE — PROGRESS NOTES
[facility] Hospital Medicine  Progress Note    Patient Name: Ivy Salgado  MRN: [id number]  Patient Class: IP- Inpatient   Admission Date: 8/30/2018  Length of Stay: 6 days  Attending Physician: Candice Chamberlain MD  Primary Care Provider: Kalia Astorga MD    [facility] Medicine Team: [facility] [place] [place] HOSP MED E Manish Leung PA-C    Subjective:     Principal Problem:Crohn's disease of small intestine    HPI:  Ivy Salgado is a 39yo WF with a PMH of Crohn's disease, anxiety, and HTN presents to the ED with complaints of CP, palpitations, and increased ostomy output. She reports one episode of burning CP last night that lasted roughly 20 minutes and occurred at rest. CP resolved on its own but was associated with diaphoresis, palpitations, dizziness, and SOB. During this episode of CP she reports a HA and L sided tingling in her hand, foot, and face that resolved with the CP. She does endorse some confusion and memory loss during this episode. She states she pulled over when the CP originally began but then decided to keep driving to her boyfriend's place in [place]. However, she does not remember the rest of the drive and arrived to the wrong hotel which she has been going to for years.     She also had another episode of palpitations this morning which prompted her to come to the ED but denies any associated CP. She has also been experiencing increased ostomy output, cramping abdominal pain, and heartburn for the last 2 weeks. She believes her current symptoms are similar to her crohn's flares in the past. She has not been on maintenance therapy for her crohn's since Feb/March due to side effects. She is currently only receiving symptomatic treatment and gets 2L NS IV once a week (Thursdays) to prevent dehydration.      ED: CBC, UA WNL. CMP revealed K+ of 2.9 replaced IV and PO. CXR and CTH unremarkable.    Hospital Course:  Ivy Salgado was admitted due to symptoms believed to be associated with  Crohn's flare. 2D ECHO, EKG and troponin's were wnl. Gastroenterology was consulted and recommended budesonide 9mg daily and MR enterography, results pending. EP consulted for persistent palpitations, recommended PRN beta blocker. Pt with continued hyperadrenergic spells of unknown origin. Cardiac MRI unremarkable. Patient started on Metoprolol (Lopressor) daily (as resting HR >90 and pt with episodes of sinus tachycardia).      Interval History: Pt without spells since Tuesday afternoon. On exam, resting heart rate usually greater than 90, will schedule beta-blocker instead of PRN dosing.     Review of Systems   Constitutional: Positive for activity change. Negative for fever.   HENT: Negative for facial swelling.         Facial flushing   Eyes: Negative for visual disturbance (2/2 to stye).   Respiratory: Negative for shortness of breath.    Cardiovascular: Positive for palpitations. Negative for chest pain.   Gastrointestinal: Positive for abdominal pain. Negative for nausea.   Genitourinary: Negative for difficulty urinating and dysuria.   Musculoskeletal: Negative for back pain and gait problem.   Skin: Positive for color change.        Skin flushing   Allergic/Immunologic: Positive for immunocompromised state.   Neurological: Positive for dizziness, tremors and weakness. Negative for syncope.   Psychiatric/Behavioral: Negative for agitation.     Objective:     Vital Signs (Most Recent):  Temp: 99 °F (37.2 °C) (09/06/18 1123)  Pulse: (!) 117 (09/06/18 1123)  Resp: 20 (09/06/18 1123)  BP: (!) 146/77 (09/06/18 1123)  SpO2: 99 % (09/06/18 1123) Vital Signs (24h Range):  Temp:  [97.1 °F (36.2 °C)-99 °F (37.2 °C)] 99 °F (37.2 °C)  Pulse:  [] 117  Resp:  [14-20] 20  SpO2:  [93 %-100 %] 99 %  BP: (102-152)/(60-88) 146/77     Weight: 48.1 kg (106 lb 1.6 oz)  Body mass index is 20.05 kg/m².    Intake/Output Summary (Last 24 hours) at 9/6/2018 1137  Last data filed at 9/6/2018 0545  Gross per 24 hour   Intake 240  ml   Output 800 ml   Net -560 ml      Physical Exam   Constitutional: She is oriented to person, place, and time. She appears well-nourished. No distress.   HENT:   Head: Normocephalic.   Eyes: EOM are normal.   Cardiovascular: Normal rate and regular rhythm.   No murmur heard.  HR ~95   Pulmonary/Chest: Effort normal and breath sounds normal. No respiratory distress.   Abdominal: Soft. She exhibits no distension. There is no tenderness.   Ileostomy in place   Musculoskeletal: Normal range of motion. She exhibits no edema.   Neurological: She is alert and oriented to person, place, and time. No cranial nerve deficit.   Skin: Skin is warm and dry. She is not diaphoretic. There is erythema (improved).   Psychiatric: She has a normal mood and affect.   Nursing note and vitals reviewed.      Significant Labs: All pertinent labs within the past 24 hours have been reviewed.    Significant Imaging: I have reviewed all pertinent imaging results/findings within the past 24 hours.    Assessment/Plan:      * Crohn's disease of small intestine    Crohn's Disease  She believes her current symptoms are similar to her crohn's flares in the past. She has not been on maintenance therapy for her crohn's since Feb/March due to side effects.     - Reports persistent increased stool output despite anti-diarrheals   - monitor stoma output, notify if >2000mL/day   - increased loperamide frequency   - GI consulted, appreciate recs   - c/w budesonide 9mg daily   - MRE without signs of active inflammation   - c/w loperamide (IMODIUM) 2 mg capsule, lomotil   - appears non-toxic, afebrile without leukocytosis  - stool studies completed 8/28   - C. Diff, Shigella, stool culture unremarkable  - promethazine (PHENERGAN) 25 MG tablet PRN   - monitor for signs of infection        Palpitations    Hyperadrenergic spells  Sinus tachycardia  38 y.o. to who presents with burning chest pain with radiation into neck, reports associated dizziness and  lightheadedness. Symptoms lasted for ~20 mins. Pain is not reproducible on exam.      Fluids today, usually receives 2L on Thursdays/week  - scheduled Metoprolol 25mg BID   - Cardiac MRI scheduled for 9/5 was without abnormality  - Pt reports increased spells since December. Previous cardiac work-up unremarkable however, episodes are persistent. Also reports developing an allergy to items in which she has always consumed, like beer which causes significant generalized flushing of her upper extremities, chest and face.    - episodes at home treated with PRN benzodiazepines   - previous imaging reviewed and adrenals appear stable   - pheochromocytoma labs in process: chromogranin A, serum serotonin; 5-HIAA plasma (neuroendocrine); Catecholamines, fractionated, plasma; Homovanillic acid, urine;  Catecholamines, fractionated, plasma; VMA, urine 24 Hours; Metanephrines, urine 24 Hours; 5 HIAA, quantitative, urine 24 Hours; Metanephrines   - EP consulted, appreciate recs   - 30-day event monitor at discharge---pt states that she was not having these episodes during the time of the eval  - TSH, T4 wnl, troponin neg   - CXR demonstrating lung zones that appear stable and clear  - 2D ECHO with EF 60-65%  - CBC, CMP (goal Mag>2, K>4) daily  - recent work-up for palpitations with 48 holter-monitor, 4/10/2018  - 30 day event monitor revealed normal sinus rhythm and sinus tachycardia (up to 129 bpm with exercise). There were no significant arrhythmias noted.  - cardiac telemetry        Hypokalemia    Stable 3.7  - continue to monitor daily         Essential hypertension, benign              Herpes genitalis in women    - valACYclovir (VALTREX) 500 MG tablet         Generalized anxiety disorder    - ALPRAZolam (XANAX) 1 MG tablet BID PRN           VTE Risk Mitigation (From admission, onward)        Ordered     IP VTE LOW RISK PATIENT  Once      08/30/18 1332     Place sequential compression device  Until discontinued       08/30/18 1332     IP VTE LOW RISK PATIENT  Once      08/30/18 1332              Manish Leung PA-C  Department of Hospital Medicine   [facility]

## 2018-09-07 ENCOUNTER — PATIENT MESSAGE (OUTPATIENT)
Dept: UROLOGY | Facility: CLINIC | Age: 38
End: 2018-09-07

## 2018-09-07 ENCOUNTER — CLINICAL SUPPORT (OUTPATIENT)
Dept: ELECTROPHYSIOLOGY | Facility: CLINIC | Age: 38
DRG: 387 | End: 2018-09-07
Attending: HOSPITALIST
Payer: MEDICAID

## 2018-09-07 VITALS
SYSTOLIC BLOOD PRESSURE: 141 MMHG | HEIGHT: 61 IN | HEART RATE: 91 BPM | OXYGEN SATURATION: 98 % | WEIGHT: 106.13 LBS | DIASTOLIC BLOOD PRESSURE: 69 MMHG | BODY MASS INDEX: 20.04 KG/M2 | RESPIRATION RATE: 16 BRPM | TEMPERATURE: 99 F

## 2018-09-07 DIAGNOSIS — R00.2 PALPITATIONS: ICD-10-CM

## 2018-09-07 LAB
5HIAA & CREATININE UR-IMP: ABNORMAL
5OH-INDOLEACETATE 24H UR-MCNC: 0.9 MG/L
5OH-INDOLEACETATE 24H UR-MRATE: 2 MG/D (ref 0–15)
5OH-INDOLEACETATE/CREAT 24H UR: 4 MG/GCR (ref 0–14)
BACTERIA BLD CULT: NORMAL
BACTERIA BLD CULT: NORMAL
CATECHOLS PLAS-MCNC: 424 PG/ML
CATECHOLS UR-IMP: ABNORMAL
CGA SERPL-MCNC: 41 NG/ML (ref 0–95)
COLLECT DURATION TIME SPEC: 24 HR
CREAT 24H UR-MRATE: 570 MG/D (ref 700–1600)
CREAT 24H UR-MRATE: 594 MG/D (ref 700–1600)
CREAT UR-MCNC: 24 MG/DL
CREAT UR-MCNC: 25 MG/DL
DOPAMINE 24H UR-MRATE: 197 UG/D (ref 77–324)
DOPAMINE SERPL-MCNC: <10 PG/ML
DOPAMINE UR-MCNC: 83 UG/L
DOPAMINE/CREAT UR: 332 UG/G CRT (ref 0–250)
EPINEPH 24H UR-MRATE: 10 UG/D (ref 1–7)
EPINEPH PLAS-MCNC: 33 PG/ML
EPINEPH UR-MCNC: 4 UG/L
EPINEPH/CREAT UR: 16 UG/G CRT (ref 0–20)
MAGNESIUM SERPL-MCNC: 1.7 MG/DL
METANEPH 24H UR-MCNC: 39 UG/L
METANEPH 24H UR-MRATE: 93 UG/D (ref 39–143)
METANEPH+NORMETANEPH UR-IMP: ABNORMAL
METANEPH/CREAT 24H UR: 156 UG/G CRT (ref 0–300)
NOREPINEPH 24H UR-MRATE: 28 UG/D (ref 16–71)
NOREPINEPH PLAS-MCNC: 391 PG/ML
NOREPINEPH UR-MCNC: 12 UG/L
NOREPINEPH/CREAT UR: 48 UG/G CRT (ref 0–45)
NORMETANEPHRINE 24H UR-MCNC: 54 UG/L
NORMETANEPHRINE 24H UR-MRATE: 128 UG/D (ref 109–393)
NORMETANEPHRINE/CREAT 24H UR: 216 UG/G CRT (ref 0–400)
PHOSPHATE SERPL-MCNC: 3.5 MG/DL
SPECIMEN VOL ?TM UR: 2375 ML
VMA & CREAT 24H UR-IMP: ABNORMAL
VMA 24H UR-MCNC: 1 MG/L
VMA 24H UR-MRATE: 2.4 MG/D (ref 0–7)
VMA/CREAT 24H UR: 4 MG/GCR (ref 0–6)

## 2018-09-07 PROCEDURE — 25000003 PHARM REV CODE 250: Performed by: HOSPITALIST

## 2018-09-07 PROCEDURE — 63600175 PHARM REV CODE 636 W HCPCS: Performed by: PHYSICIAN ASSISTANT

## 2018-09-07 PROCEDURE — 25000003 PHARM REV CODE 250: Performed by: PHYSICIAN ASSISTANT

## 2018-09-07 PROCEDURE — 99239 HOSP IP/OBS DSCHRG MGMT >30: CPT | Mod: ,,, | Performed by: PHYSICIAN ASSISTANT

## 2018-09-07 PROCEDURE — 83735 ASSAY OF MAGNESIUM: CPT

## 2018-09-07 PROCEDURE — 94761 N-INVAS EAR/PLS OXIMETRY MLT: CPT

## 2018-09-07 PROCEDURE — 84100 ASSAY OF PHOSPHORUS: CPT

## 2018-09-07 PROCEDURE — 36415 COLL VENOUS BLD VENIPUNCTURE: CPT

## 2018-09-07 PROCEDURE — 93270 REMOTE 30 DAY ECG REV/REPORT: CPT | Mod: PBBFAC | Performed by: INTERNAL MEDICINE

## 2018-09-07 RX ORDER — DRONABINOL 5 MG/1
5 CAPSULE ORAL
Qty: 60 CAPSULE | Refills: 1 | Status: SHIPPED | OUTPATIENT
Start: 2018-09-07 | End: 2018-11-07 | Stop reason: SDUPTHER

## 2018-09-07 RX ORDER — HEPARIN 100 UNIT/ML
5 SYRINGE INTRAVENOUS ONCE
Status: COMPLETED | OUTPATIENT
Start: 2018-09-07 | End: 2018-09-07

## 2018-09-07 RX ORDER — BUDESONIDE 3 MG/1
9 CAPSULE, COATED PELLETS ORAL DAILY
Qty: 6 CAPSULE | Refills: 0 | Status: SHIPPED | OUTPATIENT
Start: 2018-09-08 | End: 2018-09-10 | Stop reason: SDUPTHER

## 2018-09-07 RX ORDER — DRONABINOL 5 MG/1
5 CAPSULE ORAL
Qty: 60 CAPSULE | Refills: 1 | Status: SHIPPED | OUTPATIENT
Start: 2018-09-07 | End: 2018-09-07 | Stop reason: SDUPTHER

## 2018-09-07 RX ORDER — METOPROLOL SUCCINATE 25 MG/1
25 TABLET, EXTENDED RELEASE ORAL 2 TIMES DAILY
Qty: 60 TABLET | Refills: 3 | Status: SHIPPED | OUTPATIENT
Start: 2018-09-07 | End: 2018-12-24

## 2018-09-07 RX ADMIN — HEPARIN 500 UNITS: 100 SYRINGE at 12:09

## 2018-09-07 RX ADMIN — LORAZEPAM 2 MG: 1 TABLET ORAL at 04:09

## 2018-09-07 RX ADMIN — DIPHENOXYLATE HYDROCHLORIDE AND ATROPINE SULFATE 1 TABLET: 2.5; .025 TABLET ORAL at 12:09

## 2018-09-07 RX ADMIN — LOPERAMIDE HYDROCHLORIDE 2 MG: 2 CAPSULE ORAL at 08:09

## 2018-09-07 RX ADMIN — PROMETHAZINE HYDROCHLORIDE 25 MG: 12.5 TABLET ORAL at 08:09

## 2018-09-07 RX ADMIN — OXYCODONE HYDROCHLORIDE AND ACETAMINOPHEN 1 TABLET: 10; 325 TABLET ORAL at 12:09

## 2018-09-07 RX ADMIN — DRONABINOL 5 MG: 2.5 CAPSULE ORAL at 05:09

## 2018-09-07 RX ADMIN — SODIUM CHLORIDE 1000 ML: 0.9 INJECTION, SOLUTION INTRAVENOUS at 10:09

## 2018-09-07 RX ADMIN — PROMETHAZINE HYDROCHLORIDE 25 MG: 12.5 TABLET ORAL at 12:09

## 2018-09-07 RX ADMIN — METOPROLOL SUCCINATE 25 MG: 25 TABLET, EXTENDED RELEASE ORAL at 08:09

## 2018-09-07 RX ADMIN — DIPHENOXYLATE HYDROCHLORIDE AND ATROPINE SULFATE 1 TABLET: 2.5; .025 TABLET ORAL at 08:09

## 2018-09-07 RX ADMIN — BUDESONIDE 9 MG: 3 CAPSULE, GELATIN COATED ORAL at 08:09

## 2018-09-07 RX ADMIN — OXYCODONE HYDROCHLORIDE AND ACETAMINOPHEN 1 TABLET: 10; 325 TABLET ORAL at 04:09

## 2018-09-07 RX ADMIN — PROMETHAZINE HYDROCHLORIDE 25 MG: 12.5 TABLET ORAL at 04:09

## 2018-09-07 RX ADMIN — LORAZEPAM 2 MG: 1 TABLET ORAL at 10:09

## 2018-09-07 RX ADMIN — OXYCODONE HYDROCHLORIDE AND ACETAMINOPHEN 1 TABLET: 10; 325 TABLET ORAL at 08:09

## 2018-09-07 RX ADMIN — LOPERAMIDE HYDROCHLORIDE 2 MG: 2 CAPSULE ORAL at 12:09

## 2018-09-07 RX ADMIN — VALACYCLOVIR 500 MG: 500 TABLET, FILM COATED ORAL at 08:09

## 2018-09-07 NOTE — NURSING
Discharge instructions given to patient.  Patient verbalized understanding and had no further questions.  Patient's port-a-cath deaccessed using sterile technique, tele removed and vital signs within normal limits.  Patient now awaiting family ride for discharge at approximately 1pm.  Will continue to follow up.

## 2018-09-07 NOTE — PLAN OF CARE
09/07/18 1417   Final Note   Assessment Type Final Discharge Note     CM received confirmation from the patient's nurse, Nicole ([phone]), that the patient discharged home with the 30 day event monitor on 9/7/18.

## 2018-09-07 NOTE — PLAN OF CARE
09/07/18 1036   Discharge Reassessment   Assessment Type Discharge Planning Reassessment   Do you have any problems affording any of your prescribed medications? No   Discharge Plan A Home with family   Discharge Plan B Home Health   Can the patient answer the patient profile reliably? Yes, cognitively intact   How does the patient rate their overall health at the present time? Fair   Describe the patient's ability to walk at the present time. No restrictions   How often would a person be available to care for the patient? Whenever needed   Number of comorbid conditions (as recorded on the chart) Five or more     CM was informed by NOVA Huerta ([id number]) that the patient is medically stable to discharge today but needs a 30 day event monitor prior to discharge. Voicemail message left for Nithin ([phone]) w/the cardiology clinic informing of above. Awaiting return call. CM informed the patient's nurse, Nicole ([phone]), of discharge status. Will continue to follow.

## 2018-09-07 NOTE — PLAN OF CARE
CM received a return call from [employer] ([phone]) w/the cardiology clinic stating that she has a 30 day event monitor & will deliver it to the patient's bedside today. Will continue to follow.

## 2018-09-08 ENCOUNTER — PATIENT MESSAGE (OUTPATIENT)
Dept: GASTROENTEROLOGY | Facility: CLINIC | Age: 38
End: 2018-09-08

## 2018-09-08 NOTE — DISCHARGE SUMMARY
[facility] Hospital Medicine  Discharge Summary      Patient Name: Ivy Salgado  MRN: [id number]  Admission Date: 8/30/2018  Hospital Length of Stay: 7 days  Discharge Date and Time: 9/7/2018  2:10 PM  Attending Physician: Madison att. providers found   Discharging Provider: Ernst Huerta PA-C  Primary Care Provider: Kalia Astorga MD  [facility] Medicine Team: [facility] Ernst Huerta PA-C    HPI:   Ivy Salgado is a 39yo WF with a PMH of Crohn's disease, anxiety, and HTN presents to the ED with complaints of CP, palpitations, and increased ostomy output. She reports one episode of burning CP last night that lasted roughly 20 minutes and occurred at rest. CP resolved on its own but was associated with diaphoresis, palpitations, dizziness, and SOB. During this episode of CP she reports a HA and L sided tingling in her hand, foot, and face that resolved with the CP. She does endorse some confusion and memory loss during this episode. She states she pulled over when the CP originally began but then decided to keep driving to her boyfriend's place in [place]. However, she does not remember the rest of the drive and arrived to the wrong hotel which she has been going to for years.     She also had another episode of palpitations this morning which prompted her to come to the ED but denies any associated CP. She has also been experiencing increased ostomy output, cramping abdominal pain, and heartburn for the last 2 weeks. She believes her current symptoms are similar to her crohn's flares in the past. She has not been on maintenance therapy for her crohn's since Feb/March due to side effects. She is currently only receiving symptomatic treatment and gets 2L NS IV once a week (Thursdays) to prevent dehydration.      ED: CBC, UA WNL. CMP revealed K+ of 2.9 replaced IV and PO. CXR and CTH unremarkable.    * No surgery found *      Hospital Course:   Ivy Salgado was admitted due to  symptoms believed to be associated with Crohn's flare. 2D ECHO, EKG and troponin's were wnl. Gastroenterology was consulted and recommended budesonide 9mg daily and MR enterography, results pending. EP consulted for persistent palpitations, recommended PRN beta blocker. Pt with continued hyperadrenergic spells of unknown origin. Cardiac MRI unremarkable. Patient started on Metoprolol (Lopressor) daily (as resting HR >90 and pt with episodes of sinus tachycardia). HR responded. Patient stablef or discharge with 30 day event monitor.     Consults:   Consults (From admission, onward)        Status Ordering Provider     Inpatient consult to Electrophysiology  Once     Provider:  (Not yet assigned)    Completed WAYNE LIZ     Inpatient consult to Gastroenterology  Once     Provider:  (Not yet assigned)    Completed WAYNE LIZ          * Crohn's disease of small intestine    Crohn's Disease  She believes her current symptoms are similar to her crohn's flares in the past. She has not been on maintenance therapy for her crohn's since Feb/March due to side effects.     - Reports persistent increased stool output despite anti-diarrheals   - monitor stoma output, notify if >2000mL/day   - increased loperamide frequency   - GI consulted, appreciate recs   - c/w budesonide 9mg daily   - MRE without signs of active inflammation   - c/w loperamide (IMODIUM) 2 mg capsule, lomotil   - appears non-toxic, afebrile without leukocytosis  - stool studies completed 8/28   - C. Diff, Shigella, stool culture unremarkable  - promethazine (PHENERGAN) 25 MG tablet PRN   - monitor for signs of infection        Palpitations    Hyperadrenergic spells  Sinus tachycardia  38 y.o. to who presents with burning chest pain with radiation into neck, reports associated dizziness and lightheadedness. Symptoms lasted for ~20 mins. Pain is not reproducible on exam.      Fluids today, usually receives 2L on Thursdays/week  - scheduled  Metoprolol 25mg BID   - Cardiac MRI scheduled for 9/5 was without abnormality  - Pt reports increased spells since December. Previous cardiac work-up unremarkable however, episodes are persistent. Also reports developing an allergy to items in which she has always consumed, like beer which causes significant generalized flushing of her upper extremities, chest and face.    - episodes at home treated with PRN benzodiazepines   - previous imaging reviewed and adrenals appear stable   - pheochromocytoma labs in process: chromogranin A, serum serotonin; 5-HIAA plasma (neuroendocrine); Catecholamines, fractionated, plasma; Homovanillic acid, urine;  Catecholamines, fractionated, plasma; VMA, urine 24 Hours; Metanephrines, urine 24 Hours; 5 HIAA, quantitative, urine 24 Hours; Metanephrines   - EP consulted, appreciate recs   - 30-day event monitor at discharge---pt states that she was not having these episodes during the time of the eval  - TSH, T4 wnl, troponin neg   - CXR demonstrating lung zones that appear stable and clear  - 2D ECHO with EF 60-65%  - CBC, CMP (goal Mag>2, K>4) daily  - recent work-up for palpitations with 48 holter-monitor, 4/10/2018  - 30 day event monitor revealed normal sinus rhythm and sinus tachycardia (up to 129 bpm with exercise). There were no significant arrhythmias noted.  - cardiac telemetry        Hypokalemia    Stable 3.7  - continue to monitor daily         Herpes genitalis in women    - valACYclovir (VALTREX) 500 MG tablet         Generalized anxiety disorder    - ALPRAZolam (XANAX) 1 MG tablet BID PRN           Final Active Diagnoses:    Diagnosis Date Noted POA    PRINCIPAL PROBLEM:  Crohn's disease of small intestine [K50.00] 09/18/2013 Yes    Palpitations [R00.2] 08/30/2018 Yes    Sinus tachycardia [R00.0] 09/02/2018 Yes    Hypokalemia [E87.6] 08/30/2018 Yes    Vitamin D deficiency [E55.9] 03/27/2018 Yes    Abdominal pain [R10.9] 10/12/2016 Yes    Herpes genitalis in women  [A60.09] 12/23/2014 Yes    Generalized anxiety disorder [F41.1] 09/18/2013 Yes      Problems Resolved During this Admission:       Discharged Condition: good    Disposition: Home or Self Care    Follow Up:  Follow-up Information     Kalia Astorga MD On 9/13/2018.    Specialty:  Internal Medicine  Why:  at 10:00 AM  Contact information:  [address]  [facility] [address]  [phone]             Parish Ross MD On 9/12/2018.    Specialty:  Gastroenterology  Why:  at 2:30pm  Contact information:  [address]  [facility] [address]  [phone]             [facility] On 9/10/2018.    Specialty:  Psychiatry  Why:  Call to schedule an appointment.  Contact information:  [address]  [facility] [address] [phone]  Additional information:  [facility] 4th Floor               Patient Instructions:      Diet Cardiac     Notify your health care provider if you experience any of the following:  temperature >100.4     Notify your health care provider if you experience any of the following:  persistent nausea and vomiting or diarrhea     Notify your health care provider if you experience any of the following:  severe uncontrolled pain     Cardiac event monitor   Standing Status: Future Number of Occurrences: 1 Standing Exp. Date: 09/04/19     Order Specific Question Answer Comments   Cardiac Event Monitor Auto Trigger      Activity as tolerated       Significant Diagnostic Studies: Labs:   CMP   Recent Labs   Lab  09/06/18   0443   NA  140   K  3.7   CL  106   CO2  26   GLU  95   BUN  14   CREATININE  0.7   CALCIUM  9.0   PROT  6.9   ALBUMIN  3.4*   BILITOT  0.3   ALKPHOS  71   AST  12   ALT  8*   ANIONGAP  8   ESTGFRAFRICA  >60.0   EGFRNONAA  >60.0   , CBC   Recent Labs   Lab  09/06/18   0443   WBC  5.83   HGB  11.1*   HCT  34.0*   PLT  286    and All labs within the past 24 hours have been reviewed    Pending Diagnostic Studies:     Procedure Component Value Units  Date/Time    5-HIAA Plasma (Neuroendocrine) [[id number]] Collected:  09/04/18 0600    Order Status:  Sent Lab Status:  In process Updated:  09/04/18 0625    Specimen:  Blood     5-HIAA Plasma (Neuroendocrine) [[id number]] Collected:  09/04/18 0603    Order Status:  Sent Lab Status:  In process Updated:  09/04/18 0603    Specimen:  Blood     CBC with Automated Differential [[id number]] Collected:  09/05/18 0326    Order Status:  Sent Lab Status:  In process Updated:  09/05/18 0327    Specimen:  Blood     Catecholamines, fractionated, plasma [[id number]] Collected:  09/04/18 0603    Order Status:  Sent Lab Status:  In process Updated:  09/04/18 0603    Specimen:  Blood     Comprehensive Metabolic Panel (CMP) [[id number]] Collected:  09/05/18 0326    Order Status:  Sent Lab Status:  In process Updated:  09/05/18 0327    Specimen:  Blood     Magnesium [[id number]] Collected:  09/05/18 0326    Order Status:  Sent Lab Status:  In process Updated:  09/05/18 0327    Specimen:  Blood     Metanephrines, Plasma Free [[id number]] Collected:  09/04/18 0603    Order Status:  Sent Lab Status:  In process Updated:  09/04/18 0603    Specimen:  Blood     Phosphorus [[id number]] Collected:  09/05/18 0326    Order Status:  Sent Lab Status:  In process Updated:  09/05/18 0327    Specimen:  Blood     Serotonin serum [[id number]] Collected:  09/02/18 0419    Order Status:  Sent Lab Status:  In process Updated:  09/02/18 0429    Specimen:  Blood          Medications:  Reconciled Home Medications:      Medication List      START taking these medications    budesonide 3 mg capsule  Commonly known as:  ENTOCORT EC  Take 3 capsules (9 mg total) by mouth once daily. for 2 days     dronabinol 5 MG capsule  Commonly known as:  MARINOL  Take 1 capsule (5 mg total) by mouth 2 (two) times daily before meals.     metoprolol succinate 25 MG 24 hr tablet  Commonly known as:  TOPROL-XL  Take 1 tablet (25 mg total) by mouth 2 (two) times daily.        CONTINUE  taking these medications    ALPRAZolam 1 MG tablet  Commonly known as:  XANAX  TAKE 1 TABLET BY MOUTH TWICE DAILY AS NEEDED FOR ANXIETY     biotin 1,000 mcg Chew  Take 1 tablet by mouth once daily.     clindamycin 2 % vaginal cream  Commonly known as:  CLINDESSE  PLACE VAGINALLY EVERY EVENING     diphenoxylate-atropine 2.5-0.025 mg 2.5-0.025 mg per tablet  Commonly known as:  LOMOTIL  Take 1 tablet by mouth 4 (four) times daily as needed for Diarrhea.     ergocalciferol 50,000 unit Cap  Commonly known as:  ERGOCALCIFEROL  Take 1 capsule (50,000 Units total) by mouth every 7 days.     loperamide 2 mg capsule  Commonly known as:  IMODIUM  Take 2 mg by mouth daily as needed for Diarrhea.     magnesium 250 mg Tab  Take by mouth once.     norethindrone-ethinyl estradiol 1 mg-20 mcg (21)/75 mg (7) per tablet  Commonly known as:  JUNEL FE 1/20  Take 1 tablet by mouth once daily.     ONE DAILY MULTIVITAMIN per tablet  Generic drug:  multivitamin  Take 1 tablet by mouth once daily.     oxyCODONE-acetaminophen  mg per tablet  Commonly known as:  PERCOCET  Take 1 tablet by mouth every 6 (six) hours as needed for Pain.     potassium citrate 15 mEq Tbsr  Take 2 tablets by mouth 2 (two) times daily.     promethazine 25 MG tablet  Commonly known as:  PHENERGAN  Take 1 tablet (25 mg total) by mouth every 6 (six) hours as needed.     sumatriptan 100 MG tablet  Commonly known as:  IMITREX  Take 1 tablet (100 mg total) by mouth once.     terconazole 0.4 % Crea  Commonly known as:  TERAZOL 7  INSERT ONE APPLICATORFUL VAGINALLY AT BEDTIME     valACYclovir 500 MG tablet  Commonly known as:  VALTREX  Take 1 tablet (500 mg total) by mouth once daily.     zolpidem 10 mg Tab  Commonly known as:  AMBIEN  Take 1 tablet (10 mg total) by mouth every evening.            Indwelling Lines/Drains at time of discharge:   Lines/Drains/Airways     Drain                 Ileostomy 06/16/12 0000 RLQ 2275 days                Time spent on the  discharge of patient: 36 minutes  Patient was seen and examined on the date of discharge and determined to be suitable for discharge.         Ernst Huerta PA-C  Department of Hospital Medicine  [facility]

## 2018-09-08 NOTE — ASSESSMENT & PLAN NOTE
Crohn's Disease  She believes her current symptoms are similar to her crohn's flares in the past. She has not been on maintenance therapy for her crohn's since Feb/March due to side effects.     - Reports persistent increased stool output despite anti-diarrheals   - monitor stoma output, notify if >2000mL/day   - increased loperamide frequency   - GI consulted, appreciate recs   - c/w budesonide 9mg daily   - MRE without signs of active inflammation   - c/w loperamide (IMODIUM) 2 mg capsule, lomotil   - appears non-toxic, afebrile without leukocytosis  - stool studies completed 8/28   - C. Diff, Shigella, stool culture unremarkable  - promethazine (PHENERGAN) 25 MG tablet PRN   - monitor for signs of infection

## 2018-09-10 LAB — SEROTONIN: 67 NG/ML

## 2018-09-10 RX ORDER — BUDESONIDE 3 MG/1
9 CAPSULE, COATED PELLETS ORAL DAILY
Qty: 93 CAPSULE | Refills: 6 | Status: SHIPPED | OUTPATIENT
Start: 2018-09-10 | End: 2018-10-11

## 2018-09-11 RX ORDER — ALPRAZOLAM 1 MG/1
TABLET ORAL
Qty: 60 TABLET | Refills: 0 | Status: SHIPPED | OUTPATIENT
Start: 2018-09-11 | End: 2018-10-09 | Stop reason: SDUPTHER

## 2018-09-12 NOTE — ASSESSMENT & PLAN NOTE
Immediate Brief Procedure Note    Patient: Abelardo Bass    Pre-op Dx: LIH, umbilical hernia    Post-op Dx: LIH indirect, small umbilical hernia and diastasis    Procedure: Robotic assisted Left inguinal hernia repair with mesh, umbilical hernia repair    Surgeon:  Alex Haskins II, MD    Assistants: Ros ACEVEDO SA    Anesthesia Staff: Anesthesiologist: Domonique Foss MD  Anesthesia Staff: Sophie Black    Anesthesia Type: General    Findings: Sigmoid adhesions to left pelvis, large indirect inguinal hernia, small umbilical hernia    Estimated Blood Loss: minimal    Complications: none    Specimens Removed: none   - Home medication:    Lisinopril 10 mg qd  - BP at presentation 136/85, however at the time of the visit ~94/52  - held lisinopril while in patient, will resume on discharge

## 2018-09-12 NOTE — TELEPHONE ENCOUNTER
Attempted to call Ms. Salgado but no answer.  Left message to call the office to confirm her appt set on 09/13/18 at 10:00.

## 2018-09-13 ENCOUNTER — HOSPITAL ENCOUNTER (OUTPATIENT)
Dept: RADIOLOGY | Facility: OTHER | Age: 38
Discharge: HOME | End: 2018-09-13
Attending: UROLOGY
Payer: MEDICAID

## 2018-09-13 ENCOUNTER — OFFICE VISIT (OUTPATIENT)
Dept: UROLOGY | Facility: CLINIC | Age: 38
End: 2018-09-13
Attending: UROLOGY
Payer: MEDICAID

## 2018-09-13 ENCOUNTER — OFFICE VISIT (OUTPATIENT)
Dept: INTERNAL MEDICINE | Facility: CLINIC | Age: 38
End: 2018-09-13
Payer: MEDICAID

## 2018-09-13 VITALS
SYSTOLIC BLOOD PRESSURE: 90 MMHG | HEIGHT: 61 IN | WEIGHT: 99.88 LBS | WEIGHT: 99.88 LBS | HEART RATE: 73 BPM | DIASTOLIC BLOOD PRESSURE: 93 MMHG | SYSTOLIC BLOOD PRESSURE: 133 MMHG | BODY MASS INDEX: 18.86 KG/M2 | HEART RATE: 79 BPM | DIASTOLIC BLOOD PRESSURE: 62 MMHG | BODY MASS INDEX: 18.86 KG/M2 | HEIGHT: 61 IN

## 2018-09-13 DIAGNOSIS — Z87.440 HISTORY OF RECURRENT UTIS: ICD-10-CM

## 2018-09-13 DIAGNOSIS — I10 ESSENTIAL HYPERTENSION, BENIGN: Chronic | ICD-10-CM

## 2018-09-13 DIAGNOSIS — R82.991 HYPOCITRATURIA: ICD-10-CM

## 2018-09-13 DIAGNOSIS — R00.2 PALPITATIONS: ICD-10-CM

## 2018-09-13 DIAGNOSIS — N20.0 NEPHROLITHIASIS: Primary | ICD-10-CM

## 2018-09-13 DIAGNOSIS — R00.0 SINUS TACHYCARDIA: ICD-10-CM

## 2018-09-13 DIAGNOSIS — E72.9: ICD-10-CM

## 2018-09-13 DIAGNOSIS — N20.0 NEPHROLITHIASIS: ICD-10-CM

## 2018-09-13 DIAGNOSIS — R34 DECREASED URINE VOLUME: ICD-10-CM

## 2018-09-13 DIAGNOSIS — K50.019 CROHN'S DISEASE OF SMALL INTESTINE WITH COMPLICATION: Primary | ICD-10-CM

## 2018-09-13 LAB
BILIRUB SERPL-MCNC: ABNORMAL MG/DL
BLOOD URINE, POC: ABNORMAL
COLOR, POC UA: YELLOW
GLUCOSE UR QL STRIP: ABNORMAL
KETONES UR QL STRIP: ABNORMAL
LEUKOCYTE ESTERASE URINE, POC: ABNORMAL
NITRITE, POC UA: ABNORMAL
PH, POC UA: 5
POC RESIDUAL URINE VOLUME: 0 ML (ref 0–100)
PROTEIN, POC: ABNORMAL
SPECIFIC GRAVITY, POC UA: 1.01
UROBILINOGEN, POC UA: ABNORMAL

## 2018-09-13 PROCEDURE — 81002 URINALYSIS NONAUTO W/O SCOPE: CPT | Mod: S$GLB,,, | Performed by: UROLOGY

## 2018-09-13 PROCEDURE — 99214 OFFICE O/P EST MOD 30 MIN: CPT | Mod: S$PBB,,, | Performed by: INTERNAL MEDICINE

## 2018-09-13 PROCEDURE — 74018 RADEX ABDOMEN 1 VIEW: CPT | Mod: 26,,, | Performed by: RADIOLOGY

## 2018-09-13 PROCEDURE — 74018 RADEX ABDOMEN 1 VIEW: CPT | Mod: TC,FY

## 2018-09-13 PROCEDURE — 99213 OFFICE O/P EST LOW 20 MIN: CPT | Mod: PBBFAC,25 | Performed by: INTERNAL MEDICINE

## 2018-09-13 PROCEDURE — 99213 OFFICE O/P EST LOW 20 MIN: CPT | Mod: 25,S$GLB,, | Performed by: UROLOGY

## 2018-09-13 PROCEDURE — 99999 PR PBB SHADOW E&M-EST. PATIENT-LVL III: CPT | Mod: PBBFAC,,, | Performed by: INTERNAL MEDICINE

## 2018-09-13 NOTE — PROGRESS NOTES
"Subjective:      Ivy Salgado is a 38 y.o. female who returns today regarding her UTIs.    Full history detailed elsewhere.    Admitted recently at [facility] for cardiac issues; had negative UTI workup then.    Otherwise doing well today without c/o.    The following portions of the patient's history were reviewed and updated as appropriate: allergies, current medications, past family history, past medical history, past social history, past surgical history and problem list.    Review of Systems  A comprehensive multipoint review of systems was negative except as otherwise stated in the HPI.     Objective:   Vitals: BP (!) 133/93 (BP Location: Left arm, Patient Position: Sitting, BP Method: Large (Automatic))   Pulse 73   Ht 5' 1" (1.549 m)   Wt 45.3 kg (99 lb 13.9 oz)   LMP 03/30/2015   BMI 18.87 kg/m²     Physical Exam   General: alert and oriented, no acute distress  Respiratory: Symmetric expansion, non-labored breathing  Neuro: no gross deficits  Psych: normal judgment and insight, normal mood/affect and non-anxious    Lab Review     Lab Results   Component Value Date    WBC 5.83 09/06/2018    HGB 11.1 (L) 09/06/2018    HCT 34.0 (L) 09/06/2018    MCV 90 09/06/2018     09/06/2018     Lab Results   Component Value Date    CREATININE 0.7 09/06/2018    BUN 14 09/06/2018     Date: 11/2/17 -- Vol 0.50 L, Ca 95 mg/day, Ox 11 mg/day, Cit 132 mg/day, pH 5.319, Na 21, K 20, Mg 18, Cr 796     Imaging   Results for orders placed during the hospital encounter of 09/13/18   X-Ray Abdomen AP 1 View    Narrative EXAMINATION:  XR ABDOMEN AP 1 VIEW    CLINICAL HISTORY:  Calculus of kidney    TECHNIQUE:  Single AP View of the abdomen was performed.    COMPARISON:  CT 02/16/2018    FINDINGS:  There is ostomy projecting over the right lower abdomen with multiple extrinsic monitoring leads overlying the abdomen and pelvis.    Subcentimeter hyperdensity projects over the lower pole of the left renal shadow partially " obscured by extrinsic leads concerning for renal calculus as identified on CT.  Few scattered gas-filled loops of bowel within the abdomen and pelvis overall nonspecific bowel gas pattern.      Impression Please see above      Electronically signed by: Samson Duron DO  Date:    09/13/2018  Time:    10:26       Assessment and Plan:   1. History of recurrent UTIs  -- Doubtful small renal stone contributing; discussed surgical options to treat but she opts to defer for now  -- Monitor for now    2. Nephrolithiasis  -- Minimal stone burden on recent CT  -- Unlikely contributing to UTIs so will avoid treatment for now  -- Continue UroCit K 30 mEq BID    3. Low urine volume   -- Very low and very likely contributing to stone formation  -- Difficult to treat due to high output from ileostomy  -- Instructed in increase fluid intake, as much as possible, for goal of 2.5 - 3.0 L UOP per day (light yellow)     4. Hypocitraturia  -- Significance uncertain since stone composition unknown (small renal stones not seen on KUB)  -- Continue UroCit K 30mEq BID    5. Low urine pH  -- Significance uncertain since stone composition unknown (small renal stones not seen on KUB)  -- Continue UroCit K 30mEq BID    FU 6 mos w/ KUB

## 2018-09-13 NOTE — PROGRESS NOTES
Subjective:       Patient ID: Ivy Salgado is a 38 y.o. female.    Chief Complaint: Hospital Follow Up    Pt here for f/u from hospital where she was admitted for tachycardia/palpitations associated with chest pain and increased ostomy output. Record reviewed. It was felt she may be having Crohn's flare so was placed on entocort which has helped. However, most concerning was the tachycardia and palpitations that were accompanied by flushing and some disorientation. She had extensive workup but no clear etiology was found and no primary arythmia was identified. EP consulted and placed her on beta blocker which did help and she has not had recurrence of these symptoms. She is currently on 30 day holter monitor. She will be scheduling cardiology f/u soon. Of note her testing for pheochromocytoma and carcinoid was normal.     She does have anxiety but this is well controlled with cymbalta and xanax. She is adamant that above symptoms were not related to anxiety. She sees psychiatry. No SI/HI.     She has had issues with recurrent UTI in the past but not having any symptoms currently. She has f/u with urology today.       Review of Systems   Constitutional: Negative for fever and unexpected weight change.   Respiratory: Negative for shortness of breath.    Cardiovascular: Positive for palpitations. Negative for chest pain.   Gastrointestinal: Positive for diarrhea and nausea. Negative for abdominal pain and vomiting.   Genitourinary: Negative for dysuria.   Neurological: Negative for headaches.   Psychiatric/Behavioral: The patient is nervous/anxious.        Objective:      Physical Exam   Constitutional: She is oriented to person, place, and time. She appears well-developed and well-nourished.   Neck: Neck supple. No thyromegaly present.   Cardiovascular: Normal rate, regular rhythm and normal heart sounds.   Pulmonary/Chest: Effort normal and breath sounds normal.   Abdominal: Soft. Bowel sounds are normal. She  exhibits no distension and no mass. There is no tenderness.   Stoma intact   Musculoskeletal: She exhibits no edema.   Lymphadenopathy:     She has no cervical adenopathy.   Neurological: She is alert and oriented to person, place, and time.   Psychiatric: She has a normal mood and affect. Her behavior is normal.       Assessment:       1. Crohn's disease of small intestine with complication    2. Sinus tachycardia    3. Essential hypertension, benign    4. Palpitations        Plan:       1. Keep f/u with specialists  2. Continue current meds  3. Records and labs reviewed with pt  4. If Holter monitor unrevealing, then consider f/u with EP, especially if symptoms recur

## 2018-09-17 DIAGNOSIS — K50.819 CROHN'S DISEASE OF SMALL AND LARGE INTESTINES WITH COMPLICATION: ICD-10-CM

## 2018-09-17 RX ORDER — DIPHENOXYLATE HYDROCHLORIDE AND ATROPINE SULFATE 2.5; .025 MG/1; MG/1
1 TABLET ORAL 4 TIMES DAILY PRN
Qty: 100 TABLET | Refills: 0 | Status: SHIPPED | OUTPATIENT
Start: 2018-09-17 | End: 2018-10-17 | Stop reason: SDUPTHER

## 2018-09-17 RX ORDER — OXYCODONE AND ACETAMINOPHEN 10; 325 MG/1; MG/1
1 TABLET ORAL EVERY 6 HOURS PRN
Qty: 40 TABLET | Refills: 0 | Status: SHIPPED | OUTPATIENT
Start: 2018-09-17 | End: 2018-10-17 | Stop reason: SDUPTHER

## 2018-09-18 ENCOUNTER — PATIENT MESSAGE (OUTPATIENT)
Dept: INTERNAL MEDICINE | Facility: CLINIC | Age: 38
End: 2018-09-18

## 2018-09-18 ENCOUNTER — HOSPITAL ENCOUNTER (INPATIENT)
Facility: HOSPITAL | Age: 38
LOS: 2 days | Discharge: HOME | DRG: 641 | End: 2018-09-20
Attending: EMERGENCY MEDICINE | Admitting: FAMILY MEDICINE
Payer: MEDICAID

## 2018-09-18 ENCOUNTER — PATIENT MESSAGE (OUTPATIENT)
Dept: GASTROENTEROLOGY | Facility: CLINIC | Age: 38
End: 2018-09-18

## 2018-09-18 DIAGNOSIS — A41.9 SEPSIS: ICD-10-CM

## 2018-09-18 DIAGNOSIS — R65.10 SIRS (SYSTEMIC INFLAMMATORY RESPONSE SYNDROME): ICD-10-CM

## 2018-09-18 DIAGNOSIS — E86.9 VOLUME DEPLETION, GASTROINTESTINAL LOSS: Primary | Chronic | ICD-10-CM

## 2018-09-18 DIAGNOSIS — K50.019 CROHN'S DISEASE OF SMALL INTESTINE WITH COMPLICATION: ICD-10-CM

## 2018-09-18 DIAGNOSIS — R07.9 CHEST PAIN, UNSPECIFIED TYPE: ICD-10-CM

## 2018-09-18 LAB
ALBUMIN SERPL BCP-MCNC: 3.6 G/DL
ALP SERPL-CCNC: 89 U/L
ALT SERPL W/O P-5'-P-CCNC: 24 U/L
ANION GAP SERPL CALC-SCNC: 10 MMOL/L
AST SERPL-CCNC: 37 U/L
BASOPHILS # BLD AUTO: 0.01 K/UL
BASOPHILS NFR BLD: 0.1 %
BILIRUB SERPL-MCNC: 0.5 MG/DL
BILIRUB UR QL STRIP: NEGATIVE
BUN SERPL-MCNC: 8 MG/DL
CALCIUM SERPL-MCNC: 8.9 MG/DL
CHLORIDE SERPL-SCNC: 107 MMOL/L
CLARITY UR: CLEAR
CO2 SERPL-SCNC: 20 MMOL/L
COLOR UR: YELLOW
CREAT SERPL-MCNC: 0.8 MG/DL
DIFFERENTIAL METHOD: ABNORMAL
EOSINOPHIL # BLD AUTO: 0 K/UL
EOSINOPHIL NFR BLD: 0.3 %
ERYTHROCYTE [DISTWIDTH] IN BLOOD BY AUTOMATED COUNT: 13.1 %
EST. GFR  (AFRICAN AMERICAN): >60 ML/MIN/1.73 M^2
EST. GFR  (NON AFRICAN AMERICAN): >60 ML/MIN/1.73 M^2
ESTIMATED AVG GLUCOSE: 91 MG/DL
FLUAV AG SPEC QL IA: NEGATIVE
FLUBV AG SPEC QL IA: NEGATIVE
GLUCOSE SERPL-MCNC: 166 MG/DL
GLUCOSE UR QL STRIP: NEGATIVE
HBA1C MFR BLD HPLC: 4.8 %
HCT VFR BLD AUTO: 34.7 %
HGB BLD-MCNC: 11.7 G/DL
HGB UR QL STRIP: NEGATIVE
KETONES UR QL STRIP: NEGATIVE
LACTATE SERPL-SCNC: 1.6 MMOL/L
LACTATE SERPL-SCNC: 2.6 MMOL/L
LEUKOCYTE ESTERASE UR QL STRIP: NEGATIVE
LYMPHOCYTES # BLD AUTO: 1.1 K/UL
LYMPHOCYTES NFR BLD: 15 %
MCH RBC QN AUTO: 29.5 PG
MCHC RBC AUTO-ENTMCNC: 33.7 G/DL
MCV RBC AUTO: 87 FL
MONOCYTES # BLD AUTO: 0.7 K/UL
MONOCYTES NFR BLD: 9.2 %
NEUTROPHILS # BLD AUTO: 5.5 K/UL
NEUTROPHILS NFR BLD: 75.3 %
NITRITE UR QL STRIP: NEGATIVE
PH UR STRIP: 6 [PH] (ref 5–8)
PLATELET # BLD AUTO: 247 K/UL
PMV BLD AUTO: 9.9 FL
POTASSIUM SERPL-SCNC: 3.2 MMOL/L
PROT SERPL-MCNC: 7.3 G/DL
PROT UR QL STRIP: NEGATIVE
RBC # BLD AUTO: 3.97 M/UL
SODIUM SERPL-SCNC: 137 MMOL/L
SP GR UR STRIP: 1.01 (ref 1–1.03)
SPECIMEN SOURCE: NORMAL
URN SPEC COLLECT METH UR: NORMAL
UROBILINOGEN UR STRIP-ACNC: NEGATIVE EU/DL
WBC # BLD AUTO: 7.32 K/UL

## 2018-09-18 PROCEDURE — 87088 URINE BACTERIA CULTURE: CPT

## 2018-09-18 PROCEDURE — 25000003 PHARM REV CODE 250: Performed by: EMERGENCY MEDICINE

## 2018-09-18 PROCEDURE — 80053 COMPREHEN METABOLIC PANEL: CPT

## 2018-09-18 PROCEDURE — 87040 BLOOD CULTURE FOR BACTERIA: CPT | Mod: 59

## 2018-09-18 PROCEDURE — 87400 INFLUENZA A/B EACH AG IA: CPT | Mod: 59

## 2018-09-18 PROCEDURE — 96367 TX/PROPH/DG ADDL SEQ IV INF: CPT

## 2018-09-18 PROCEDURE — 81003 URINALYSIS AUTO W/O SCOPE: CPT

## 2018-09-18 PROCEDURE — 99285 EMERGENCY DEPT VISIT HI MDM: CPT | Mod: 25

## 2018-09-18 PROCEDURE — 83036 HEMOGLOBIN GLYCOSYLATED A1C: CPT

## 2018-09-18 PROCEDURE — 96365 THER/PROPH/DIAG IV INF INIT: CPT

## 2018-09-18 PROCEDURE — 63600175 PHARM REV CODE 636 W HCPCS: Performed by: EMERGENCY MEDICINE

## 2018-09-18 PROCEDURE — 96375 TX/PRO/DX INJ NEW DRUG ADDON: CPT

## 2018-09-18 PROCEDURE — 11000001 HC ACUTE MED/SURG PRIVATE ROOM

## 2018-09-18 PROCEDURE — 25000003 PHARM REV CODE 250: Performed by: STUDENT IN AN ORGANIZED HEALTH CARE EDUCATION/TRAINING PROGRAM

## 2018-09-18 PROCEDURE — 85025 COMPLETE CBC W/AUTO DIFF WBC: CPT

## 2018-09-18 PROCEDURE — 87077 CULTURE AEROBIC IDENTIFY: CPT

## 2018-09-18 PROCEDURE — 93005 ELECTROCARDIOGRAM TRACING: CPT

## 2018-09-18 PROCEDURE — 87086 URINE CULTURE/COLONY COUNT: CPT

## 2018-09-18 PROCEDURE — 87186 SC STD MICRODIL/AGAR DIL: CPT

## 2018-09-18 PROCEDURE — 83605 ASSAY OF LACTIC ACID: CPT

## 2018-09-18 PROCEDURE — 96366 THER/PROPH/DIAG IV INF ADDON: CPT

## 2018-09-18 PROCEDURE — 36415 COLL VENOUS BLD VENIPUNCTURE: CPT

## 2018-09-18 PROCEDURE — 83605 ASSAY OF LACTIC ACID: CPT | Mod: 91

## 2018-09-18 RX ORDER — ERGOCALCIFEROL 1.25 MG/1
50000 CAPSULE ORAL
Status: DISCONTINUED | OUTPATIENT
Start: 2018-09-19 | End: 2018-09-20 | Stop reason: HOSPADM

## 2018-09-18 RX ORDER — LINEZOLID 2 MG/ML
600 INJECTION, SOLUTION INTRAVENOUS
Status: DISCONTINUED | OUTPATIENT
Start: 2018-09-19 | End: 2018-09-19

## 2018-09-18 RX ORDER — BUDESONIDE 3 MG/1
9 CAPSULE, COATED PELLETS ORAL DAILY
Status: DISCONTINUED | OUTPATIENT
Start: 2018-09-19 | End: 2018-09-20 | Stop reason: HOSPADM

## 2018-09-18 RX ORDER — PROMETHAZINE HYDROCHLORIDE 25 MG/1
25 TABLET ORAL EVERY 6 HOURS PRN
Status: DISCONTINUED | OUTPATIENT
Start: 2018-09-18 | End: 2018-09-20 | Stop reason: HOSPADM

## 2018-09-18 RX ORDER — ACETAMINOPHEN 325 MG/1
650 TABLET ORAL EVERY 4 HOURS PRN
Status: DISCONTINUED | OUTPATIENT
Start: 2018-09-18 | End: 2018-09-20 | Stop reason: HOSPADM

## 2018-09-18 RX ORDER — METOPROLOL SUCCINATE 25 MG/1
25 TABLET, EXTENDED RELEASE ORAL 2 TIMES DAILY
Status: DISCONTINUED | OUTPATIENT
Start: 2018-09-18 | End: 2018-09-20 | Stop reason: HOSPADM

## 2018-09-18 RX ORDER — NORETHINDRONE ACETATE AND ETHINYL ESTRADIOL 1MG-20(21)
1 KIT ORAL DAILY
Status: DISCONTINUED | OUTPATIENT
Start: 2018-09-18 | End: 2018-09-20 | Stop reason: HOSPADM

## 2018-09-18 RX ORDER — LINEZOLID 2 MG/ML
600 INJECTION, SOLUTION INTRAVENOUS
Status: COMPLETED | OUTPATIENT
Start: 2018-09-18 | End: 2018-09-18

## 2018-09-18 RX ORDER — GLUCAGON 1 MG
1 KIT INJECTION
Status: DISCONTINUED | OUTPATIENT
Start: 2018-09-18 | End: 2018-09-20 | Stop reason: HOSPADM

## 2018-09-18 RX ORDER — SODIUM CHLORIDE 0.9 % (FLUSH) 0.9 %
5 SYRINGE (ML) INJECTION
Status: DISCONTINUED | OUTPATIENT
Start: 2018-09-18 | End: 2018-09-20 | Stop reason: HOSPADM

## 2018-09-18 RX ORDER — POTASSIUM CHLORIDE 20 MEQ/1
60 TABLET, EXTENDED RELEASE ORAL ONCE
Status: COMPLETED | OUTPATIENT
Start: 2018-09-18 | End: 2018-09-18

## 2018-09-18 RX ORDER — DRONABINOL 2.5 MG/1
5 CAPSULE ORAL
Status: DISCONTINUED | OUTPATIENT
Start: 2018-09-19 | End: 2018-09-20 | Stop reason: HOSPADM

## 2018-09-18 RX ORDER — IBUPROFEN 200 MG
24 TABLET ORAL
Status: DISCONTINUED | OUTPATIENT
Start: 2018-09-18 | End: 2018-09-20 | Stop reason: HOSPADM

## 2018-09-18 RX ORDER — CLINDAMYCIN PHOSPHATE 20 MG/G
1 CREAM VAGINAL NIGHTLY
Status: DISCONTINUED | OUTPATIENT
Start: 2018-09-18 | End: 2018-09-20 | Stop reason: HOSPADM

## 2018-09-18 RX ORDER — ZOLPIDEM TARTRATE 5 MG/1
5 TABLET ORAL NIGHTLY
Status: DISCONTINUED | OUTPATIENT
Start: 2018-09-18 | End: 2018-09-20 | Stop reason: HOSPADM

## 2018-09-18 RX ORDER — ALPRAZOLAM 1 MG/1
1 TABLET ORAL 2 TIMES DAILY
Status: DISCONTINUED | OUTPATIENT
Start: 2018-09-18 | End: 2018-09-20 | Stop reason: HOSPADM

## 2018-09-18 RX ORDER — DIPHENOXYLATE HYDROCHLORIDE AND ATROPINE SULFATE 2.5; .025 MG/1; MG/1
1 TABLET ORAL 4 TIMES DAILY PRN
Status: DISCONTINUED | OUTPATIENT
Start: 2018-09-18 | End: 2018-09-20 | Stop reason: HOSPADM

## 2018-09-18 RX ORDER — DEXTROSE MONOHYDRATE, SODIUM CHLORIDE, AND POTASSIUM CHLORIDE 50; 1.49; 4.5 G/1000ML; G/1000ML; G/1000ML
INJECTION, SOLUTION INTRAVENOUS CONTINUOUS
Status: DISPENSED | OUTPATIENT
Start: 2018-09-18 | End: 2018-09-19

## 2018-09-18 RX ORDER — IBUPROFEN 200 MG
16 TABLET ORAL
Status: DISCONTINUED | OUTPATIENT
Start: 2018-09-18 | End: 2018-09-20 | Stop reason: HOSPADM

## 2018-09-18 RX ORDER — OXYCODONE AND ACETAMINOPHEN 10; 325 MG/1; MG/1
1 TABLET ORAL EVERY 6 HOURS PRN
Status: DISCONTINUED | OUTPATIENT
Start: 2018-09-18 | End: 2018-09-20 | Stop reason: HOSPADM

## 2018-09-18 RX ORDER — LOPERAMIDE HYDROCHLORIDE 2 MG/1
2 CAPSULE ORAL DAILY PRN
Status: DISCONTINUED | OUTPATIENT
Start: 2018-09-18 | End: 2018-09-20 | Stop reason: HOSPADM

## 2018-09-18 RX ADMIN — DEXTROSE MONOHYDRATE, SODIUM CHLORIDE, AND POTASSIUM CHLORIDE: 50; 4.5; 1.49 INJECTION, SOLUTION INTRAVENOUS at 09:09

## 2018-09-18 RX ADMIN — PIPERACILLIN AND TAZOBACTAM 4.5 G: 4; .5 INJECTION, POWDER, LYOPHILIZED, FOR SOLUTION INTRAVENOUS; PARENTERAL at 04:09

## 2018-09-18 RX ADMIN — ZOLPIDEM TARTRATE 5 MG: 5 TABLET ORAL at 10:09

## 2018-09-18 RX ADMIN — PROMETHAZINE HYDROCHLORIDE 25 MG: 25 TABLET ORAL at 10:09

## 2018-09-18 RX ADMIN — METOPROLOL SUCCINATE 25 MG: 25 TABLET, EXTENDED RELEASE ORAL at 10:09

## 2018-09-18 RX ADMIN — ALPRAZOLAM 1 MG: 1 TABLET ORAL at 10:09

## 2018-09-18 RX ADMIN — LOPERAMIDE HYDROCHLORIDE 2 MG: 2 CAPSULE ORAL at 10:09

## 2018-09-18 RX ADMIN — SODIUM CHLORIDE 1335 ML: 0.9 INJECTION, SOLUTION INTRAVENOUS at 04:09

## 2018-09-18 RX ADMIN — POTASSIUM CHLORIDE 60 MEQ: 20 TABLET, EXTENDED RELEASE ORAL at 07:09

## 2018-09-18 RX ADMIN — LORAZEPAM 1 MG: 2 INJECTION INTRAMUSCULAR; INTRAVENOUS at 04:09

## 2018-09-18 RX ADMIN — LINEZOLID 600 MG: 600 INJECTION, SOLUTION INTRAVENOUS at 05:09

## 2018-09-18 NOTE — H&P
"[facility]  History & Physical    SUBJECTIVE:     Chief Complaint:   SIRS    History of Present Illness:  37 yo female w/ pmhx of Crohn's disease, anxiety, HTN presents to the ED with complaints of left sternal chest pain "that feels like my heart is going to beat out of my chest" associated with palpitations and generalized fatigue.  Chest pain started at rest and was relieved when HR improved after fluids for SIRS.    Ms. Salgado has a port in place for maintenance therapy for her Crohn's.  She received 2 L NS IV once a week to prevent dehydration (she usually receives that Thursdays).    She was recently admitted at [facility] main campus for a similar presentation, though without fever.  Cardiac work-up was negative, including cardiac MRI.  Pt currently wearing Holter monitor with work-up ongoing.  Pt lost 8 pounds in the last month. (106 lbs to 98).  She was supposed to have a follow-up appointment for potential pheo, but was admitted instead.    Received 30 cc/kg bolus of fluids, cultures were drawn and patient received broad spectrum Abx (zosyn and linezolid due to prior bad reaction to vanco).    Allergies:  Review of patient's allergies indicates:   Allergen Reactions    Stelara [ustekinumab] Other (See Comments)     Multiple infections    Zofran [ondansetron hcl (pf)] Other (See Comments)     Per patient causes prolong QT    Vancomycin analogues Hives    Azathioprine sodium      Other reaction(s): pancreatitis  Other reaction(s): pancreatitis    Methotrexate analogues     Morphine Itching and Other (See Comments)     Other reaction(s): Itching       Home Medications:  No current facility-administered medications on file prior to encounter.      Current Outpatient Medications on File Prior to Encounter   Medication Sig    ALPRAZolam (XANAX) 1 MG tablet TAKE 1 TABLET BY MOUTH TWICE DAILY AS NEEDED FOR ANXIETY    biotin 1,000 mcg Chew Take 1 tablet by mouth once daily.    budesonide (ENTOCORT EC) " 3 mg capsule Take 3 capsules (9 mg total) by mouth once daily.    clindamycin (CLINDESSE) 2 % vaginal cream PLACE VAGINALLY EVERY EVENING    diphenoxylate-atropine 2.5-0.025 mg (LOMOTIL) 2.5-0.025 mg per tablet Take 1 tablet by mouth 4 (four) times daily as needed for Diarrhea.    dronabinol (MARINOL) 5 MG capsule Take 1 capsule (5 mg total) by mouth 2 (two) times daily before meals.    ergocalciferol (ERGOCALCIFEROL) 50,000 unit Cap Take 1 capsule (50,000 Units total) by mouth every 7 days.    flu vac [id number] 36mos up,PF, 60 mcg (15 mcg x 4)/0.5 mL Syrg inject into the muscle    loperamide (IMODIUM) 2 mg capsule Take 2 mg by mouth daily as needed for Diarrhea.    magnesium 250 mg Tab Take by mouth once.    metoprolol succinate (TOPROL-XL) 25 MG 24 hr tablet Take 1 tablet (25 mg total) by mouth 2 (two) times daily.    multivitamin (ONE DAILY MULTIVITAMIN) per tablet Take 1 tablet by mouth once daily.    norethindrone-ethinyl estradiol (JUNEL FE 1/20) 1 mg-20 mcg (21)/75 mg (7) per tablet Take 1 tablet by mouth once daily.    oxyCODONE-acetaminophen (PERCOCET)  mg per tablet Take 1 tablet by mouth every 6 (six) hours as needed for Pain.    potassium citrate 15 mEq TbSR Take 2 tablets by mouth 2 (two) times daily.    promethazine (PHENERGAN) 25 MG tablet Take 1 tablet (25 mg total) by mouth every 6 (six) hours as needed.    sumatriptan (IMITREX) 100 MG tablet Take 1 tablet (100 mg total) by mouth once.    terconazole (TERAZOL 7) 0.4 % Crea INSERT ONE APPLICATORFUL VAGINALLY AT BEDTIME    valACYclovir (VALTREX) 500 MG tablet Take 1 tablet (500 mg total) by mouth once daily.    zolpidem (AMBIEN) 10 mg Tab Take 1 tablet (10 mg total) by mouth every evening.       Past Medical History:   Diagnosis Date    Abnormal Pap smear 2007    Abnormal Pap smear 5/26/2011    Anemia     Anxiety     Arthritis     C. difficile diarrhea     Crohn's disease     Depression 8/5/2017    Encounter for blood  transfusion     Genital HSV     History of colposcopy with cervical biopsy 2007 and 7/2011 2007-LYLA I  and 7/2011- LYLA I    Hypertension     Kidney stone     Kidney stone     Recurrent UTI 4/3/2013    S/P ileostomy 7/9/2012    Sterilization 6/23/2012     Past Surgical History:   Procedure Laterality Date    ABDOMINAL SURGERY      APPENDECTOMY      BLADDER SURGERY      partial cystectomy due to fistula    CKC      COLON SURGERY      COLONOSCOPY      EGD (ESOPHAGOGASTRODUODENOSCOPY) N/A 9/6/2013    [facility]formed by [facility] at [facility] (4TH FLR)    ESOPHAGOGASTRODUODENOSCOPY (EGD) N/A 10/14/2016    Performed by Janelle Mcpherson MD at [facility] END[facility] (2ND FLR)    ESOPHAGOGASTRODUODENOSCOPY (EGD) N/A 10/23/2014    Performed by Nathanael Mitchell MD at [facility] END[facility] (2ND FLR)    EXAM UNDER ANESTHESIA N/A 8/29/2013    Performed by Bob [facility]ons MD at [facility] OR 2ND FLR    EXAM UNDER ANESTHESIA N/A 1/18/2013    Performed by [facility] at [facility] OR 2ND FLR    HYSTERECTOMY  04/16/2015    SHELLIE    HYSTERECTOMY-ABDOMINAL-TOTAL (SHELLIE) N/A 4/16/2015    [facility] by [facility] at [facility]    ILEOSCOPY N/A 8/8/2017    Performed by Tommie Klein MD at [facility] END[facility] (2ND FLR)    ILEOSCOPY N/A 10/14/2016    Performed by Janelle [facility]son MD at [facility] (2ND FLR)    ILEOSCOPY N/A 9/25/2013    Performed by [facility] at [facility] (4TH FLR)    ILEOSTOMY      INCISION AND DRAINAGE, ABSCESS N/A 8/29/2013    Performed by Bob [facility]ons [facility] at [facility] OR 2ND FLR    [id number]-P-CATH Left 2/21/2017    Performed by Parish Sanchez Jr., MD at [facility] OR 2ND FLR    OOPHORECTOMY Right 04/16/2015    PORTACATH PLACEMENT      REPAIR-BLADDER  4/16/2015    Performed by [facility] at [facility] OR    SALPINGO-OOPHERECTOMY Right 4/16/2015    Performed by [facility] at [facility] OR    SIGMOIDOSCOPY, FLEXIBLE N/A 2/7/2014    [facility] ([facility]     SKIN BIOPSY      SMALL INTESTINE SURGERY      TOTAL COLECTOMY      TUBAL LIGATION  06 06 2012    UPPER GASTROINTESTINAL ENDOSCOPY       Family History   Problem Relation Age of Onset    Colon cancer Father     Cancer Father         colon cancer    Hypertension Mother     Cancer Maternal Grandfather         esopghal     Skin cancer Maternal Grandfather     Endometrial cancer Maternal Aunt     Crohn's disease Brother     Breast cancer Neg Hx     Ovarian cancer Neg Hx      Social History     Tobacco Use    Smoking status: Never Smoker    Smokeless tobacco: Never Used   Substance Use Topics    Alcohol use: Yes     Alcohol/week: 0.0 oz     Comment: Patient only drinks wine not regular basis.    Drug use: No        Review of Systems:  Constitutional: + fever or chills  Eyes: no visual changes  ENT: no nasal congestion or sore throat  Respiratory: no cough or shortness of breath  Cardiovascular: + chest pain, + palpitations  Gastrointestinal: no nausea or vomiting, no abdominal pain or change in ileostomy habits  Genitourinary: no hematuria or dysuria, +vaginal itching  Integument/Breast: no rash or pruritis  Hematologic/Lymphatic: no easy bruising or lymphadenopathy  Musculoskeletal: no arthralgias or myalgias  Neurological: no seizures, + generalized tremors  Behavioral/Psych: no auditory or visual hallucinations  Endocrine: no heat or cold intolerance    OBJECTIVE:     Vital Signs:  Temp: 100.3 °F (37.9 °C) (09/18/18 1759)  Pulse: (!) 113 (09/18/18 1759)  Resp: 18 (09/18/18 1759)  BP: 122/69 (09/18/18 1759)  SpO2: 100 % (09/18/18 1759)    Physical Exam:  General: well developed, no distress, grossly tremulous  HEENT: normocephalic, atraumatic, hearing grossly normal bilaterally, mucous membranes moist, EOM intact, no scleral icterus  Neck: supple, symmetrical, trachea midline, no JVD  Lungs:  clear to auscultation bilaterally and normal respiratory effort  Cardiovascular: tachycardic and regular rhythm.     Extremities: thin legs, no cyanosis or edema, distal pulses palpable and symmetric  Abdomen: soft, non-tender to palpation, incision scar well-healed, ileostomy in place c/d/i, no distention, no masses/hernias  Skin: Skin color, texture, turgor normal. No rashes or lesions  Musculoskeletal: no clubbing, no deformities  Neurologic: No focal numbness or weakness  Psych/Behavioral:  Alert and oriented, appropriate affect.    Laboratory:  Labs Reviewed   CBC W/ AUTO DIFFERENTIAL - Abnormal; Notable for the following components:       Result Value    RBC 3.97 (*)     Hemoglobin 11.7 (*)     Hematocrit 34.7 (*)     Gran% 75.3 (*)     Lymph% 15.0 (*)     All other components within normal limits   COMPREHENSIVE METABOLIC PANEL - Abnormal; Notable for the following components:    Potassium 3.2 (*)     CO2 20 (*)     Glucose 166 (*)     All other components within normal limits   LACTIC ACID, PLASMA - Abnormal; Notable for the following components:    Lactate (Lactic Acid) 2.6 (*)     All other components within normal limits   CULTURE, BLOOD   CULTURE, BLOOD   URINALYSIS, REFLEX TO URINE CULTURE    Narrative:     Preferred Collection Type->Urine, Clean Catch   INFLUENZA A AND B ANTIGEN   LACTIC ACID, PLASMA   URINALYSIS, REFLEX TO URINE CULTURE       Diagnostic Results:  Imaging Results          X-Ray Chest AP Portable (Final result)  Result time 09/18/18 16:24:13    Final result by Kelby Solano MD (09/18/18 16:24:13)                 Impression:      1. No acute cardiopulmonary process.      Electronically signed by: Kelby Solano MD  Date:    09/18/2018  Time:    16:24             Narrative:    EXAMINATION:  XR CHEST AP PORTABLE    CLINICAL HISTORY:  Sepsis;    TECHNIQUE:  Single frontal view of the chest was performed.    COMPARISON:  08/30/2018    FINDINGS:  Left chest wall port catheter tip projects over the mid to distal SVC.  The cardiomediastinal silhouette is within normal limits.  There is no pleural  effusion.  The trachea is midline.  The lungs are symmetrically expanded bilaterally with coarse interstitial attenuation, similar to the previous exam.  There may be scattered calcified granulomas..  No large focal consolidation seen.  There is no pneumothorax.  The osseous structures are remarkable for dextroscoliotic curvature of the thoracic spine.  Curvature of the midshaft left clavicle may reflect sequela of previous injury..                                ASSESSMENT:     37 yo female w/ Crohn's disease w/ dumping and ileostomy, HTN, anxiety and recent hospital admission with chest pain, palpitations undergoing work-up for pheochromocytoma presents with SIRS, suspicion of infected infected subclavian port.    PLAN:     Admit to family medicine    SIRS  Seeming sepsis, though no known cause at this point  Cont zosyn and linezolid  MIVF  WBC normal, but  Daily labs  Infectious disease consult, given mysterious course of illness    Crohns Disease  Consult ostomy care for supplies, assistance  Low residue diet    Work-up for pheochromocytoma  Catecholamines, metanephrines WNL  Additional work-up pending       This case was discussed with staff.    Nathaniel Escalante  9:36 PM  09/18/2018

## 2018-09-18 NOTE — ED PROVIDER NOTES
"Encounter Date: 9/18/2018    SCRIBE #1 NOTE: I, April Le, am scribing for, and in the presence of, Dr. Linares.       History     Chief Complaint   Patient presents with    Tachycardia     patient has been having tachycardia;" feels like my heart is racing" halter monitor noted to be in place patient states she has had it on for 12 days and that tachycardia is the reason she is wearing the monitor.      Ivy Salgado is a 38 y.o. female who  has a past medical history of Abnormal Pap smear (2007), Abnormal Pap smear (5/26/2011), Anemia, Anxiety, Arthritis, C. difficile diarrhea, Crohn's disease, Depression (8/5/2017), Encounter for blood transfusion, Genital HSV, History of colposcopy with cervical biopsy (2007 and 7/2011), Hypertension, Kidney stone, Kidney stone, Recurrent UTI (4/3/2013), S/P ileostomy (7/9/2012), and Sterilization (6/23/2012).    The patient presents to the ED due to palpitation that began 20 min before calling EMS. She reports associated shaking, chills, jittery, and weakness. She states she has been feeling bad all day. Pt states she was recently d/c from the hospital 2 weeks ago. Pt had pheochromocytoma work up and carcinoid was normal. She has tachycardia and has halter monitor in place. She states she has been feeling flushed all day. She was started me metoprolol 3 weeks ago for her elevated HR. She occasionally get hypokalemia from crohn's and increased ostomy output. She goes and get IVF weekly; she's due to get IVF in 3 days. Denies any recent cough or sore throat. Reports diarrhea. Recently had flu shot last week. Pt has temp of 100.8F in triage.            The history is provided by the patient.     Review of patient's allergies indicates:   Allergen Reactions    Stelara [ustekinumab] Other (See Comments)     Multiple infections    Zofran [ondansetron hcl (pf)] Other (See Comments)     Per patient causes prolong QT    Vancomycin analogues Hives    Azathioprine sodium      " Other reaction(s): pancreatitis  Other reaction(s): pancreatitis    Methotrexate analogues     Morphine Itching and Other (See Comments)     Other reaction(s): Itching     Past Medical History:   Diagnosis Date    Abnormal Pap smear 2007    Abnormal Pap smear 5/26/2011    Anemia     Anxiety     Arthritis     C. difficile diarrhea     Crohn's disease     Depression 8/5/2017    Encounter for blood transfusion     Genital HSV     History of colposcopy with cervical biopsy 2007 and 7/2011 2007-LYLA I  and 7/2011- LYLA I    Hypertension     Kidney stone     Kidney stone     Recurrent UTI 4/3/2013    S/P ileostomy 7/9/2012    Sterilization 6/23/2012     Past Surgical History:   Procedure Laterality Date    ABDOMINAL SURGERY      APPENDECTOMY      BLADDER SURGERY      partial cystectomy due to fistula    CKC      COLON SURGERY      COLONOSCOPY      EGD (ESOPHAGOGASTRODUODENOSCOPY) N/A 9/6/2013    Performed by [facility] at [facility] END[facility] (4TH FLR)    ESOPHAGOGASTRODUODENOSCOPY (EGD) N/A 10/14/2016    Performed by Janelle Mcpherson MD at [facility] ENDO (2ND FLR)    ESOPHAGOGASTRODUODENOSCOPY (EGD) N/A 10/23/2014    Performed by Nathanael Mitchell MD at [facility] ENDO (2ND FLR)    EXAM UNDER ANESTHESIA N/A 8/29/2013    Performed by Bob Parsons MD at [facility] OR 2ND FLR    EXAM UNDER ANESTHESIA N/A 1/18/2013    Performed by Bob Parsons MD at [facility] OR 2ND FLR    HYSTERECTOMY  04/16/2015    SHELLIE    HYSTERECTOMY-ABDOMINAL-TOTAL (SHELLIE) N/A 4/16/2015    [facility]    ILEOSCOPY N/A 8/8/2017    Performed by Tommie Klein MD at [facility] ENDO (2ND FLR)    ILEOSCOPY N/A 10/14/2016    Performed by Janelle Mcpherson MD at [facility] ENDO (2ND FLR)    ILEOSCOPY N/A 9/25/2013    Performed by Emilio Cameron MD at [facility] ENDO (4TH FLR)    ILEOSTOMY      INCISION AND DRAINAGE, ABSCESS N/A 8/29/2013    Performed by Bob Parsons MD at [facility] OR 2ND FLR    [id number]-CATH  Left 2/21/2017    Performed by Parish [facility] at [facility] OR 2ND FLR    OOPHORECTOMY Right 04/16/2015    PORTACATH PLACEMENT      REPAIR-BLADDER  4/16/2015    [facility]    SALPINGO-OOPHERECTOMY Right 4/16/2015    [facility]formed by [facility] at [facility] OR    SIGMOIDOSCOPY, FLEXIBLE N/A 2/7/2014    Performed by Er[facility]o [facility]ell MD at [facility] ENDO (2ND FLR)    SKIN BIOPSY      SMALL INTESTINE SURGERY      TOTAL COLECTOMY      TUBAL LIGATION  06 06 2012    UPPER GASTROINTESTINAL ENDOSCOPY       Family History   Problem Relation Age of Onset    Colon cancer Father     Cancer Father         colon cancer    Hypertension Mother     Cancer Maternal Grandfather         esopghal     Skin cancer Maternal Grandfather     Endometrial cancer Maternal Aunt     Crohn's disease Brother     Breast cancer Neg Hx     Ovarian cancer Neg Hx      Social History     Tobacco Use    Smoking status: Never Smoker    Smokeless tobacco: Never Used   Substance Use Topics    Alcohol use: Yes     Alcohol/week: 0.0 oz     Comment: Patient only drinks wine not regular basis.    Drug use: No     Review of Systems   Constitutional: Positive for chills, fatigue and fever.   HENT: Negative for congestion, sore throat and voice change.    Eyes: Negative for photophobia, pain and redness.   Respiratory: Negative for cough, choking and shortness of breath.    Cardiovascular: Positive for palpitations. Negative for chest pain and leg swelling.   Gastrointestinal: Positive for diarrhea. Negative for abdominal pain, nausea and vomiting.   Genitourinary: Negative for dysuria, frequency and urgency.   Musculoskeletal: Negative for back pain, neck pain and neck stiffness.   Skin: Negative for rash.   Neurological: Negative for seizures, light-headedness, numbness and headaches.   All other systems reviewed and are negative.      Physical Exam     Initial Vitals [09/18/18 1511]   BP Pulse Resp  Temp SpO2   (!) 150/77 (!) 125 18 (!) 100.8 °F (38.2 °C) 99 %      MAP       --         Physical Exam    Nursing note and vitals reviewed.  Constitutional: She appears well-developed and well-nourished. She is not diaphoretic. No distress.   Shaking chills    HENT:   Head: Normocephalic and atraumatic.   Mouth/Throat: Oropharynx is clear and moist.   Eyes: Conjunctivae and EOM are normal. Pupils are equal, round, and reactive to light.   Neck: Normal range of motion. Neck supple.   Cardiovascular: Normal rate, regular rhythm, normal heart sounds and intact distal pulses.   Pulmonary/Chest: Breath sounds normal. No respiratory distress.   Abdominal: Soft. She exhibits no distension. There is no tenderness. There is no rebound and no guarding.   Musculoskeletal: Normal range of motion. She exhibits no edema or tenderness.   Neurological: She is alert and oriented to person, place, and time. She has normal strength.   Skin: Skin is warm and dry. Capillary refill takes less than 2 seconds.   Psychiatric: She has a normal mood and affect. Thought content normal.         ED Course   Critical Care  Date/Time: 10/7/2018 12:46 PM  Performed by: Paris Linares MD  Authorized by: Saud Castaneda MD   Total critical care time (exclusive of procedural time) : 30 minutes  Critical care time was exclusive of separately billable procedures and treating other patients.  Critical care was necessary to treat or prevent imminent or life-threatening deterioration of the following conditions: sepsis.  Critical care was time spent personally by me on the following activities: development of treatment plan with patient or surrogate, discussions with consultants, evaluation of patient's response to treatment, examination of patient, ordering and performing treatments and interventions, obtaining history from patient or surrogate, ordering and review of laboratory studies, ordering and review of radiographic studies, pulse oximetry,  re-evaluation of patient's condition and review of old charts.        Labs Reviewed   CBC W/ AUTO DIFFERENTIAL - Abnormal; Notable for the following components:       Result Value    RBC 3.97 (*)     Hemoglobin 11.7 (*)     Hematocrit 34.7 (*)     Gran% 75.3 (*)     Lymph% 15.0 (*)     All other components within normal limits   COMPREHENSIVE METABOLIC PANEL - Abnormal; Notable for the following components:    Potassium 3.2 (*)     CO2 20 (*)     Glucose 166 (*)     All other components within normal limits   LACTIC ACID, PLASMA - Abnormal; Notable for the following components:    Lactate (Lactic Acid) 2.6 (*)     All other components within normal limits   CULTURE, BLOOD   CULTURE, BLOOD   URINALYSIS, REFLEX TO URINE CULTURE   INFLUENZA A AND B ANTIGEN   LACTIC ACID, PLASMA     EKG Readings: (Independently Interpreted)   Rhythm: Sinus Tachycardia. Heart Rate: 131. Ectopy: No Ectopy. Conduction: Normal. ST Segments: Normal ST Segments. T Waves: Normal. Axis: Normal.   1519:       Imaging Results          X-Ray Chest AP Portable (Final result)  Result time 09/18/18 16:24:13    Final result by Kelby Solano MD (09/18/18 16:24:13)                 Impression:      1. No acute cardiopulmonary process.      Electronically signed by: Kelby Solano MD  Date:    09/18/2018  Time:    16:24             Narrative:    EXAMINATION:  XR CHEST AP PORTABLE    CLINICAL HISTORY:  Sepsis;    TECHNIQUE:  Single frontal view of the chest was performed.    COMPARISON:  08/30/2018    FINDINGS:  Left chest wall port catheter tip projects over the mid to distal SVC.  The cardiomediastinal silhouette is within normal limits.  There is no pleural effusion.  The trachea is midline.  The lungs are symmetrically expanded bilaterally with coarse interstitial attenuation, similar to the previous exam.  There may be scattered calcified granulomas..  No large focal consolidation seen.  There is no pneumothorax.  The osseous structures are  remarkable for dextroscoliotic curvature of the thoracic spine.  Curvature of the midshaft left clavicle may reflect sequela of previous injury..                                 Medical Decision Making:   History:   Old Medical Records: I decided to obtain old medical records.  Independently Interpreted Test(s):   I have ordered and independently interpreted EKG Reading(s) - see prior notes  Clinical Tests:   Lab Tests: Reviewed and Ordered  Radiological Study: Ordered and Reviewed  Medical Tests: Reviewed and Ordered  ED Management:  The patient meets septic criteria. She will be admitted to the [facility] hospitalist service. Dr. Castaneda consulted for admission.                   ED Course as of Sep 18 1714   Tue Sep 18, 2018   1528 Temp: (!) 100.8 °F (38.2 °C) [ST]      ED Course User Index  [ST] Paris Linares MD     Clinical Impression:     1. Sepsis                 I, Paris Linares, personally performed the services described in this documentation. All medical record entries made by the scribe were at my direction and in my presence.  I have reviewed the chart and agree that the record reflects my personal performance and is accurate and complete. Paris Linares M.D. 4:52 PM09/18/2018                 Paris Linares MD  09/18/18 1714       Paris Linares MD  10/07/18 1246

## 2018-09-19 ENCOUNTER — PATIENT MESSAGE (OUTPATIENT)
Dept: GASTROENTEROLOGY | Facility: CLINIC | Age: 38
End: 2018-09-19

## 2018-09-19 ENCOUNTER — PATIENT MESSAGE (OUTPATIENT)
Dept: INTERNAL MEDICINE | Facility: CLINIC | Age: 38
End: 2018-09-19

## 2018-09-19 PROBLEM — N39.0 UTI DUE TO EXTENDED-SPECTRUM BETA LACTAMASE (ESBL) PRODUCING ESCHERICHIA COLI: Status: RESOLVED | Noted: 2018-02-05 | Resolved: 2018-09-19

## 2018-09-19 PROBLEM — R07.9 CHEST PAIN: Status: ACTIVE | Noted: 2018-02-03

## 2018-09-19 PROBLEM — B96.29 UTI DUE TO EXTENDED-SPECTRUM BETA LACTAMASE (ESBL) PRODUCING ESCHERICHIA COLI: Status: RESOLVED | Noted: 2018-02-05 | Resolved: 2018-09-19

## 2018-09-19 PROBLEM — K50.019 CROHN'S DISEASE OF SMALL INTESTINE WITH COMPLICATION: Chronic | Status: ACTIVE | Noted: 2017-02-21

## 2018-09-19 PROBLEM — A49.8 INFECTION DUE TO ESBL-PRODUCING ESCHERICHIA COLI: Status: RESOLVED | Noted: 2018-02-05 | Resolved: 2018-09-19

## 2018-09-19 PROBLEM — E86.9 VOLUME DEPLETION, GASTROINTESTINAL LOSS: Chronic | Status: ACTIVE | Noted: 2018-02-16

## 2018-09-19 PROBLEM — R11.2 NON-INTRACTABLE VOMITING WITH NAUSEA: Status: RESOLVED | Noted: 2018-02-06 | Resolved: 2018-09-19

## 2018-09-19 PROBLEM — A68.9 RECURRENT FEVER: Chronic | Status: ACTIVE | Noted: 2018-09-19

## 2018-09-19 PROBLEM — Z16.12 INFECTION DUE TO ESBL-PRODUCING ESCHERICHIA COLI: Status: RESOLVED | Noted: 2018-02-05 | Resolved: 2018-09-19

## 2018-09-19 PROBLEM — R11.2 NAUSEA & VOMITING: Status: RESOLVED | Noted: 2018-02-16 | Resolved: 2018-09-19

## 2018-09-19 PROBLEM — Z16.12 UTI DUE TO EXTENDED-SPECTRUM BETA LACTAMASE (ESBL) PRODUCING ESCHERICHIA COLI: Status: RESOLVED | Noted: 2018-02-05 | Resolved: 2018-09-19

## 2018-09-19 LAB
ALBUMIN SERPL BCP-MCNC: 3.1 G/DL
ALP SERPL-CCNC: 81 U/L
ALT SERPL W/O P-5'-P-CCNC: 27 U/L
ANION GAP SERPL CALC-SCNC: 8 MMOL/L
AST SERPL-CCNC: 31 U/L
BASOPHILS # BLD AUTO: 0.01 K/UL
BASOPHILS NFR BLD: 0.2 %
BILIRUB SERPL-MCNC: 0.9 MG/DL
BUN SERPL-MCNC: 4 MG/DL
CALCIUM SERPL-MCNC: 8.9 MG/DL
CHLORIDE SERPL-SCNC: 108 MMOL/L
CO2 SERPL-SCNC: 20 MMOL/L
CREAT SERPL-MCNC: 0.8 MG/DL
DIFFERENTIAL METHOD: ABNORMAL
EOSINOPHIL # BLD AUTO: 0.1 K/UL
EOSINOPHIL NFR BLD: 1.2 %
ERYTHROCYTE [DISTWIDTH] IN BLOOD BY AUTOMATED COUNT: 13.1 %
EST. GFR  (AFRICAN AMERICAN): >60 ML/MIN/1.73 M^2
EST. GFR  (NON AFRICAN AMERICAN): >60 ML/MIN/1.73 M^2
GLUCOSE SERPL-MCNC: 171 MG/DL
HCT VFR BLD AUTO: 31.7 %
HGB BLD-MCNC: 10.4 G/DL
LACTATE SERPL-SCNC: 0.8 MMOL/L
LACTATE SERPL-SCNC: 1.5 MMOL/L
LACTATE SERPL-SCNC: 1.7 MMOL/L
LYMPHOCYTES # BLD AUTO: 2.1 K/UL
LYMPHOCYTES NFR BLD: 36 %
MAGNESIUM SERPL-MCNC: 1.7 MG/DL
MCH RBC QN AUTO: 28.7 PG
MCHC RBC AUTO-ENTMCNC: 32.8 G/DL
MCV RBC AUTO: 88 FL
MONOCYTES # BLD AUTO: 0.6 K/UL
MONOCYTES NFR BLD: 9.6 %
NEUTROPHILS # BLD AUTO: 3.1 K/UL
NEUTROPHILS NFR BLD: 52.8 %
PHOSPHATE SERPL-MCNC: 3 MG/DL
PLATELET # BLD AUTO: 242 K/UL
PMV BLD AUTO: 9.4 FL
POTASSIUM SERPL-SCNC: 3.7 MMOL/L
PROT SERPL-MCNC: 6.3 G/DL
RBC # BLD AUTO: 3.62 M/UL
SODIUM SERPL-SCNC: 136 MMOL/L
WBC # BLD AUTO: 5.84 K/UL

## 2018-09-19 PROCEDURE — 63600175 PHARM REV CODE 636 W HCPCS: Performed by: FAMILY MEDICINE

## 2018-09-19 PROCEDURE — 94761 N-INVAS EAR/PLS OXIMETRY MLT: CPT

## 2018-09-19 PROCEDURE — 85025 COMPLETE CBC W/AUTO DIFF WBC: CPT

## 2018-09-19 PROCEDURE — 83735 ASSAY OF MAGNESIUM: CPT

## 2018-09-19 PROCEDURE — 25000003 PHARM REV CODE 250: Performed by: FAMILY MEDICINE

## 2018-09-19 PROCEDURE — 63600175 PHARM REV CODE 636 W HCPCS: Performed by: HOSPITALIST

## 2018-09-19 PROCEDURE — 11000001 HC ACUTE MED/SURG PRIVATE ROOM

## 2018-09-19 PROCEDURE — 25000003 PHARM REV CODE 250: Performed by: STUDENT IN AN ORGANIZED HEALTH CARE EDUCATION/TRAINING PROGRAM

## 2018-09-19 PROCEDURE — 63600175 PHARM REV CODE 636 W HCPCS: Performed by: STUDENT IN AN ORGANIZED HEALTH CARE EDUCATION/TRAINING PROGRAM

## 2018-09-19 PROCEDURE — 83605 ASSAY OF LACTIC ACID: CPT

## 2018-09-19 PROCEDURE — 36415 COLL VENOUS BLD VENIPUNCTURE: CPT

## 2018-09-19 PROCEDURE — 80053 COMPREHEN METABOLIC PANEL: CPT

## 2018-09-19 PROCEDURE — 84100 ASSAY OF PHOSPHORUS: CPT

## 2018-09-19 RX ORDER — ENOXAPARIN SODIUM 100 MG/ML
40 INJECTION SUBCUTANEOUS EVERY 24 HOURS
Status: DISCONTINUED | OUTPATIENT
Start: 2018-09-19 | End: 2018-09-20 | Stop reason: HOSPADM

## 2018-09-19 RX ORDER — DEXTROSE MONOHYDRATE, SODIUM CHLORIDE, AND POTASSIUM CHLORIDE 50; 1.49; 4.5 G/1000ML; G/1000ML; G/1000ML
INJECTION, SOLUTION INTRAVENOUS CONTINUOUS
Status: DISCONTINUED | OUTPATIENT
Start: 2018-09-19 | End: 2018-09-20 | Stop reason: HOSPADM

## 2018-09-19 RX ORDER — ENOXAPARIN SODIUM 100 MG/ML
30 INJECTION SUBCUTANEOUS EVERY 24 HOURS
Status: DISCONTINUED | OUTPATIENT
Start: 2018-09-19 | End: 2018-09-19 | Stop reason: DRUGHIGH

## 2018-09-19 RX ADMIN — PIPERACILLIN AND TAZOBACTAM 4.5 G: 4; .5 INJECTION, POWDER, LYOPHILIZED, FOR SOLUTION INTRAVENOUS; PARENTERAL at 12:09

## 2018-09-19 RX ADMIN — METOPROLOL SUCCINATE 25 MG: 25 TABLET, EXTENDED RELEASE ORAL at 09:09

## 2018-09-19 RX ADMIN — PIPERACILLIN AND TAZOBACTAM 4.5 G: 4; .5 INJECTION, POWDER, LYOPHILIZED, FOR SOLUTION INTRAVENOUS; PARENTERAL at 04:09

## 2018-09-19 RX ADMIN — OXYCODONE HYDROCHLORIDE AND ACETAMINOPHEN 1 TABLET: 10; 325 TABLET ORAL at 12:09

## 2018-09-19 RX ADMIN — BUDESONIDE 9 MG: 3 CAPSULE ORAL at 12:09

## 2018-09-19 RX ADMIN — ALPRAZOLAM 1 MG: 1 TABLET ORAL at 09:09

## 2018-09-19 RX ADMIN — ENOXAPARIN SODIUM 40 MG: 100 INJECTION SUBCUTANEOUS at 04:09

## 2018-09-19 RX ADMIN — DEXTROSE MONOHYDRATE, SODIUM CHLORIDE, AND POTASSIUM CHLORIDE: 50; 4.5; 1.49 INJECTION, SOLUTION INTRAVENOUS at 02:09

## 2018-09-19 RX ADMIN — PIPERACILLIN AND TAZOBACTAM 4.5 G: 4; .5 INJECTION, POWDER, LYOPHILIZED, FOR SOLUTION INTRAVENOUS; PARENTERAL at 09:09

## 2018-09-19 RX ADMIN — OXYCODONE HYDROCHLORIDE AND ACETAMINOPHEN 1 TABLET: 10; 325 TABLET ORAL at 09:09

## 2018-09-19 RX ADMIN — PROMETHAZINE HYDROCHLORIDE 25 MG: 25 TABLET ORAL at 01:09

## 2018-09-19 RX ADMIN — DRONABINOL 5 MG: 2.5 CAPSULE ORAL at 09:09

## 2018-09-19 RX ADMIN — ZOLPIDEM TARTRATE 5 MG: 5 TABLET ORAL at 09:09

## 2018-09-19 RX ADMIN — LINEZOLID 600 MG: 600 INJECTION, SOLUTION INTRAVENOUS at 05:09

## 2018-09-19 RX ADMIN — DRONABINOL 5 MG: 2.5 CAPSULE ORAL at 06:09

## 2018-09-19 RX ADMIN — NORETHINDRONE ACETATE AND ETHINYL ESTRADIOL 1 TABLET: KIT at 09:09

## 2018-09-19 RX ADMIN — ERGOCALCIFEROL 50000 UNITS: 1.25 CAPSULE ORAL at 09:09

## 2018-09-19 RX ADMIN — OXYCODONE HYDROCHLORIDE AND ACETAMINOPHEN 1 TABLET: 10; 325 TABLET ORAL at 01:09

## 2018-09-19 RX ADMIN — ACETAMINOPHEN 650 MG: 325 TABLET ORAL at 07:09

## 2018-09-19 RX ADMIN — LOPERAMIDE HYDROCHLORIDE 2 MG: 2 CAPSULE ORAL at 04:09

## 2018-09-19 NOTE — ASSESSMENT & PLAN NOTE
Will cont to follow IDs recommendations. Still awaiting urine cultures that are pending but blood cultures show no growth to date. Patient is currently on Linezolid 600 mg IV q 12 and Zosyn 4.5 g q 8 hours. Will follow any recommendations from ID, WBC 5.84, temp range 97.9-100.8, patient was tachycardic in the ED heart rate range . Will cont to follow blood/urine cultures. Per ID -because of abdominal tenderness ID recommends a CT abdomen or US abdomen to evaluate for possible abdominal infectious source. Also ID is recommending repeat blood cultures from indwelling left subclavian port as indwelling device possible source of infection. Will consider recommendations.

## 2018-09-19 NOTE — PLAN OF CARE
Problem: Patient Care Overview  Goal: Plan of Care Review  Outcome: Ongoing (interventions implemented as appropriate)  Plan of care reviewed with patient.   Patient verbalized complete understanding.  PRN med given throughout the shift for pain and nausea. D5.Ns 0.45/20 meq running at 100ml/hr throughtout the shift. Fall precautions maintained. Bed in lowest position, locked, call light within reach, and bed alarm on. Side rails up x's 2 with slip resistant socks on. Nurse instructed patient to notify staff for any assistance and pt verbalized complete understanding. Patient on telemetry throughout shift with no ectopy noted. Tele will be removed order d/c. Patient is still wearing the 30 day monitor she came in with. Will continue to monitor

## 2018-09-19 NOTE — PLAN OF CARE
09/19/18 1516   Discharge Assessment   Assessment Type Discharge Planning Assessment   Confirmed/corrected address and phone number on facesheet? Yes   Assessment information obtained from? Patient   Prior to hospitilization cognitive status: Alert/Oriented   Prior to hospitalization functional status: Independent   Current cognitive status: Alert/Oriented   Current Functional Status: Independent   Facility Arrived From: her friend's home   Lives With parent(s);child(katalina), dependent  (11 year old daughter)   Able to Return to Prior Arrangements yes   Is patient able to care for self after discharge? Yes   Who are your caregiver(s) and their phone number(s)? Milan Salgado(dad)[phone]/Shaila Naomi(mom)[phone]   Readmission Within The Last 30 Days current reason for admission unrelated to previous admission  (Pt was at [facility] about 2 weeks ago)   If yes, most recent facility name: [facility]   Patient currently being followed by outpatient case management? No   Patient currently receives any other outside agency services? No   Equipment Currently Used at Home colostomy/ostomy supplies   Do you have any problems affording any of your prescribed medications? No   Is the patient taking medications as prescribed? yes   Does the patient have transportation home? Yes   Transportation Available family or friend will provide;car   Does the patient receive services at the [facility]? No   Discharge Plan A Home with family   Discharge Plan B Home Health   Patient/Family In Agreement With Plan yes   Readmission Questionnaire   At the time of your discharge, did someone talk to you about what your health problems were? Yes   At the time of discharge, did someone talk to you about what to watch out for regarding worsening of your health problem? Yes   At the time of discharge, did someone talk to you about what to do if you experienced worsening of your health problem? Yes   At the time of discharge, did  someone talk to you about which medication to take when you left the hospital and which ones to stop taking? Yes   At the time of discharge, did someone talk to you about when and where to follow up with a doctor after you left the hospital? Yes   What do you believe caused you to be sick enough to be re-admitted? septic,low grade fever and a high heart rate   How often do you need to have someone help you when you read instructions, pamphlets, or other written material from your doctor or pharmacy? Never   Do you have problems taking your medications as prescribed? No   Do you have any problems affording any of  your prescribed medications? No   Do you have problems obtaining/receiving your medications? No   Does the patient have transportation to healthcare appointments? Yes   Living Arrangements house   Does the patient have family/friends to help with healtcare needs after discharge? yes   Does your caregiver provide all the help you need? Yes   Are you currently feeling confused? No   Are you currently having problems thinking? No   Are you currently having memory problems? No   Have you felt down, depressed, or hopeless? 0   Have you felt little interest or pleasure in doing things? 0   In the last 7 days, my sleep quality was: fair   The Sw met with the pt to complete the assessment. The Sw gave the pt her business card and wrote the contact info on the white board in the pt's room. The pt's independent with all her adl's and uses no dme at home. 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 The pt has a very supportive family and her mother will transport her home at d/c. The pt was in [facility] about 2 weeks ago.

## 2018-09-19 NOTE — ASSESSMENT & PLAN NOTE
UTI d/t extended-spectrum beta lactamase producing E. Coli    Urinalysis is WNL, no complaints at this time. The patient has chronic UTIs in the past but U/A is Ok at this time.

## 2018-09-19 NOTE — SUBJECTIVE & OBJECTIVE
Interval History: The patient is sitting up in bed AAOx4, no distress noted no complaints of pain noted at this time. The patient does complain of tremors from time to time, but the patient reports this is not new. No family at the bedside, will follow.    Review of Systems   Constitutional: Positive for activity change (The patient reports feeling fatigued and unable to do much because she has been very sleepy.), appetite change (The patient reports a decreased appetite.), fatigue (The patient reports fatigue.), fever (The patient reports low grade temps at home. ) and unexpected weight change (The patient reports a 8 pound weight lost within the past month). Negative for chills and diaphoresis.   HENT: Negative for congestion, facial swelling, sinus pressure, sinus pain, sneezing and sore throat.    Eyes: Negative for discharge and itching.   Respiratory: Positive for shortness of breath (The patient reports SOB at home when her heart rate is elevated but denies SOB at this time. ). Negative for cough, chest tightness and wheezing.    Cardiovascular: Positive for chest pain (The patient reports having chest pain at home and in the ED but none at this time. ) and palpitations (The patient reports experiencing palpitations at home, none at this time. Holter monitor in place. ).   Gastrointestinal: Positive for diarrhea (The patient reports significant diarrhea from ostomy.) and nausea (The patient has reported nausea with no vomiting at home. ). Negative for abdominal distention, abdominal pain, blood in stool and vomiting.   Endocrine: Negative for polydipsia, polyphagia and polyuria.   Genitourinary: Negative for difficulty urinating, hematuria, pelvic pain and urgency.   Musculoskeletal: Positive for back pain (The patient reports right flank discomfort, she states it does not really hurt.). Negative for arthralgias, gait problem, myalgias and neck pain.   Skin: Negative for color change, rash and wound.    Neurological: Positive for tremors (The patient states she has been experiencing tremors.  ) and weakness. Negative for dizziness, syncope, facial asymmetry, speech difficulty, light-headedness, numbness and headaches.   Hematological: Does not bruise/bleed easily.   Psychiatric/Behavioral: Negative for agitation and confusion. The patient is not nervous/anxious.      Objective:     Vital Signs (Most Recent):  Temp: 98.9 °F (37.2 °C) (09/19/18 1645)  Pulse: 68 (09/19/18 1645)  Resp: 16 (09/19/18 1645)  BP: 113/77 (09/19/18 1645)  SpO2: 99 % (09/19/18 0854) Vital Signs (24h Range):  Temp:  [97.9 °F (36.6 °C)-100.6 °F (38.1 °C)] 98.9 °F (37.2 °C)  Pulse:  [] 68  Resp:  [16-20] 16  SpO2:  [97 %-100 %] 99 %  BP: (108-138)/(66-84) 113/77     Weight: 44.9 kg (99 lb)  Body mass index is 18.71 kg/m².    Intake/Output Summary (Last 24 hours) at 9/19/2018 1704  Last data filed at 9/19/2018 1018  Gross per 24 hour   Intake 1735 ml   Output 200 ml   Net 1535 ml      Physical Exam   Constitutional: She is oriented to person, place, and time. She appears well-developed and well-nourished. No distress.   HENT:   Head: Normocephalic and atraumatic.   Eyes: Pupils are equal, round, and reactive to light.   Neck: Normal range of motion. Neck supple.   Cardiovascular: Normal rate, regular rhythm, normal heart sounds and intact distal pulses.   No murmur heard.  Holter monitor in place.   Pulmonary/Chest: Effort normal and breath sounds normal. No respiratory distress. She has no wheezes.   Abdominal: Soft. Bowel sounds are normal. She exhibits no distension. There is tenderness (Mild abd tenderness on palpation). There is no guarding.   Right lower quad Ileostomy noted.    Musculoskeletal: Normal range of motion.   Some weakness noted.   Neurological: She is alert and oriented to person, place, and time.   Skin: Skin is warm and dry. Capillary refill takes less than 2 seconds. She is not diaphoretic.   Left CW port in place.     Psychiatric: She has a normal mood and affect. Her behavior is normal. Judgment and thought content normal.   Nursing note and vitals reviewed.      Significant Labs:   Recent Lab Results       09/19/18  0623 09/18/18  2353 09/18/18 2016 09/18/18  1719      Albumin 3.1        Alkaline Phosphatase 81        ALT 27        Anion Gap 8        Appearance, UA    Clear     AST 31        Baso # 0.01        Basophil% 0.2        Bilirubin (UA)    Negative     Total Bilirubin 0.9  Comment:  For infants and newborns, interpretation of results should be based  on gestational age, weight and in agreement with clinical  observations.  Premature Infant recommended reference ranges:  Up to 24 hours.............<8.0 mg/dL  Up to 48 hours............<12.0 mg/dL  3-5 days..................<15.0 mg/dL  6-29 days.................<15.0 mg/dL          BUN, Bld 4        Calcium 8.9        Chloride 108        CO2 20        Color, UA    Yellow     Creatinine 0.8        Differential Method Automated        eGFR if  >60        eGFR if non  >60  Comment:  Calculation used to obtain the estimated glomerular filtration  rate (eGFR) is the CKD-EPI equation.           Eos # 0.1        Eosinophil% 1.2        Glucose 171        Glucose, UA    Negative     Gran # (ANC) 3.1        Gran% 52.8        Hematocrit 31.7        Hemoglobin 10.4        Ketones, UA    Negative     Lactate, Favian 1.5  Comment:  Falsely low lactic acid results can be found in samples   containing >=13.0 mg/dL total bilirubin and/or >=3.5 mg/dL   direct bilirubin.   1.7  Comment:  Falsely low lactic acid results can be found in samples   containing >=13.0 mg/dL total bilirubin and/or >=3.5 mg/dL   direct bilirubin.   1.6  Comment:  Falsely low lactic acid results can be found in samples   containing >=13.0 mg/dL total bilirubin and/or >=3.5 mg/dL   direct bilirubin.        Leukocytes, UA    Negative     Lymph # 2.1        Lymph% 36.0        Magnesium  1.7        MCH 28.7        MCHC 32.8        MCV 88        Mono # 0.6        Mono% 9.6        MPV 9.4        Nitrite, UA    Negative     Occult Blood UA    Negative     pH, UA    6.0     Phosphorus 3.0        Platelets 242        Potassium 3.7        Total Protein 6.3        Protein, UA    Negative  Comment:  Recommend a 24 hour urine protein or a urine   protein/creatinine ratio if globulin induced proteinuria is  clinically suspected.       RBC 3.62        RDW 13.1        Sodium 136        Specific [place], UA    1.015     Specimen UA    Urine, Clean Catch     Urobilinogen, UA    Negative     WBC 5.84              Significant Imaging: I have reviewed and interpreted all pertinent imaging results/findings within the past 24 hours.

## 2018-09-19 NOTE — PLAN OF CARE
[facility]  Transfer Acceptance Note    Ivy Salgado is a 38 y.o. white woman with fistulizing Crohn's disease since age 10, status post enterovesicular fistula repair in 1992, abdominal abscess draining in 1998, completion proctocolectomy with end ileostomy on 6/12/12, history of pancreatitis due to azathioprine, history of lupus-like reaction due to adalimumab, leukopenia due to methotrexate, history of infliximab use with loss of effectiveness, history of ustekinumab use until 1/24/18, history of left subclavian port placement on 2/21/17, history of tubal ligation on 6/12/12, cervical dysplasia status post cold knife conization on 10/3/12, history of total abdominal hysterectomy with right salpingo-oophorectomy and repair of bladder adhesiolysis on 4/16/15, history of recurrent urinary tract infections, nephrolithiasis, anxiety, myopic astigmatism.  She gets 2 liters of normal saline once weekly (usually Thursdays) through her port to prevent dehydration.   She lives in [place], [place].  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  Her primary care physician is Dr. Kalia Astorga.  Her gastroenterologist is Dr. Parish Ross.  Her colorectal surgeon is Dr. Bob Parsons.  Her OBGYN is Dr. Alix Morales.  Her father had colon cancer at age 46.     She was seen at [facility] on 1/26/18 for tachycardia with palpitations, shortness of breath, chest pain, and nausea, felt to be due to hypovolemia.     She was hospitalized at [facility] from 2/2/18 to 2/5/18 for sepsis due to ESBL E coli urinary tract infection.  She had presenting symptoms similar to 1/26/18.   She was hospitalized at [facility] from 2/5/18 to 2/8/18 for persistent pyelonephritis and completed antibiotic treatment in the hospital.   She was hospitalized at [facility] from 2/16/18 to 2/17/18 for vomiting, abdominal pain, and chills thought to be due to gastroenteritis and anxiety.   She was seen at [facility] on 2/20/18 for her palpitations, which were thought to be due to dehydration and sepsis.  She had a 48-hour Holter monitor that showed no sustained arrhythmia.     She was hospitalized at [facility] from 8/30/18 to 9/7/18 for recurrent palpitations, chest pain, shortness of breath.  Transthoracic echocardiogram and cardiac MRI showed no abnormalities.  Gastroenterology started her on budesonide 9 mg daily.  Electrophysiology started her on metoprolol succinate 25 mg daily.  She was discharged with a 30-day event monitor.   She presented to [facility] on 9/18/18 for recurrent symptoms.  However, she also had new fever, with an initial temperature of 100.8° Fahrenheit, with subsequent febrile temperatures in the ED.  She showed an 8 pound weight loss in the past month.  Lactate was 2.6.  WBC count was normal.  Urinalysis was normal.  Chest X-ray showed no acute process.  She was given 1335 mL of normal saline.  She was mistakenly admitted to [facility].  She was put on dextrose 5% 0.45% saline with potassium chloride at 100 mL/hr and empiric piperacillin-tazobactam and linezolid.  Infectious Disease was consulted to help evaluate the etiology of her fever.  [facility] took over her care the next day.  She has been having fevers for years attributed to her Crohn's disease, and Infectious Disease felt that she had no active infection, that her current fevers were her chronic fevers.    Review of systems: Palpitations, lightheadedness, headache    Assessment: If infection has been ruled out, then her symptoms are related to her chronic gastrointestinal volume depletion.  She lacks a colon, so does not absorb water in her digestive tract.  She also reported that her last scope  showed active Crohn's, which would worsen ostomy output.    Plan: Check orthostatic vital signs.  Increase fluids to 150 mL/hr.  Check calprotectin to evaluate Crohn's disease activity.  This result can be followed up outpatient.

## 2018-09-19 NOTE — ASSESSMENT & PLAN NOTE
Chronic. The patient is currently not taking anything for hypertension. SBP range 108-116. The patient is on Metoprolol 25 mg daily for palpitations that has been resumed.

## 2018-09-19 NOTE — PROGRESS NOTES
As pt wad admitted to the incorrect service given Ms. Naomi's PCP at [facility], Family medicine team talked with [facility] to correct this issue.  Dr. Castaneda agreed to accept patient in a transfer of care.        This was explained to the patient and she expressed understanding.

## 2018-09-19 NOTE — NURSING
Plan of care reviewed with patient, understanding verbalized. Pt remains NSR on tele. No complaints of pain at this time. D5W 0.45% NaCl; K 20mEq continuously infusing at 100ml/hr. Bed alarm on, call light in reach, fall precautions in place. No distress noted. Will continue to monitor

## 2018-09-19 NOTE — HPI
Ms. Ivy Salgado is a 38 year old white female who lives in [place], the patient is a nurse at [employer]. The patient has a past medical history of Crohn's disease, anxiety, hypertension, an Abnormal Pap smear in 2007 and in 2011, Anemia,  Arthritis, C. difficile diarrhea, Depression, blood transfusion, Genital HSV, Kidney stones, recurrent UTI. The patient has a past surgical history of ileostomy on 7/9/2012 and Sterilization in 6/23/2012.The patient is being followed by Dr. Phu Obrien with Urology, Dr. Kalia Astorga with Internal Medicine, Dr. Parish Ross with Gastroenterology, Dr. Alix Morales with OBGYN, Dr. Paris Lazaro with Endocrinology, and Dr. John Saucedo with Cardiology services. The patient presents to the ED with complaints of left sternal chest pain; the patient states that it feels like her heart is going to beat out of her chest. The patient states the symptoms are associated with palpitations and generalized fatigue. Per the ED notes the patients chest pain started at rest and was relieved when her heart rate improved after fluids for SIRS. The patient has a left chest wall port in place for maintenance IV therapy. The patient states she receives 2 Liters of  Normal saline IV once a week to prevent dehydration; the patient states she usually receives IV therapy on Thursdays. The patient reported she intermittently has fevers and the patient states she has been having the tachycardia episodes since February of 2018. The patient states this is the third time this has happened. The patient was recently admitted at [facility] two weeks ago for a similar presentation although without fever. At the [facility] the  cardiac work-up was negative, including cardiac MRI. The patient is currently wearing a Holter monitor with work-up ongoing.  The patient has reported a decrease in appetite with a 8 pound weight lost  in the last month; the patient reported her weight went from 106  lbs to 98. The patient also states she has been dumping a significant amount of stool from her ostomy site. The patient was supposed to have a follow-up appointment for potential pheo, but was admitted instead. In the Ed the patient has received IV  Fluids, blood and urine cultures were drawn and patient received broad spectrum Abx (zosyn and linezolid due to prior bad reaction to vanco). No growth to date noted with the blood cultures. The patients WBCs were normal in the ED however the patient has had a temp; temp range 97.9-100.8. ID has been consulted will follow for IDs recommendations. The patient will also be followed by [facility] service while inpatient.

## 2018-09-19 NOTE — PROGRESS NOTES
Progress Note  [facility]  Admit Date: 9/18/2018   LOS: 1 day   SUBJECTIVE:   Follow-up For: palpitations    NAEON.  Afebrile since midnight.  Feels better since starting antibiotics and getting fluids.  Still with some tremors.  Abdominal pain mild.  Will be missing her appointment with Dr. Ross at [facility] today who manages her Crohn's disease.    States she does have fevers sometimes with her Crohn's but is willing to have the port taken out if needed from an infectious stand-point.  ID consulted.    ROS   Denies chest pain, palpitations, SOB.      OBJECTIVE:   Vital Signs (Most Recent)  Temp: 98.7 °F (37.1 °C) (09/19/18 1153)  Pulse: 65 (09/19/18 1200)  Resp: 16 (09/19/18 1153)  BP: 108/68 (09/19/18 1153)  SpO2: 99 % (09/19/18 0854)    I & O (Last 24H):    Intake/Output Summary (Last 24 hours) at 9/19/2018 1312  Last data filed at 9/19/2018 1018  Gross per 24 hour   Intake 1735 ml   Output 200 ml   Net 1535 ml     Wt Readings from Last 3 Encounters:   09/18/18 44.9 kg (99 lb)   09/13/18 45.3 kg (99 lb 13.9 oz)   09/13/18 45.3 kg (99 lb 13.9 oz)       Current Diet Order   Procedures    Diet Adult Regular (IDDSI Level 7)        Physical Exam   Constitutional: She is oriented to person, place, and time and well-developed, well-nourished, and in no distress. No distress.   HENT:   Head: Normocephalic.   Eyes: Conjunctivae are normal.   Neck: Normal range of motion.   Cardiovascular: Normal rate, regular rhythm and normal heart sounds.   No murmur heard.  Pulmonary/Chest: Breath sounds normal. No respiratory distress. She has no wheezes. She has no rales.   Port in place left chest wall, no erythema, no draining   Abdominal: Soft. Bowel sounds are normal. She exhibits no distension. There is tenderness (generalized diffuse very mild). There is no rebound.   Iileostomy in place   Musculoskeletal: Normal range of motion. She exhibits no edema.   Neurological: She is alert and oriented to person, place,  and time.   Skin: Skin is warm and dry. She is not diaphoretic. No erythema.   Psychiatric: Affect and judgment normal.   Nursing note and vitals reviewed.        Laboratory Data:  CBC  Recent Labs   Lab  09/18/18   1609  09/19/18   0623   WBC  7.32  5.84   RBC  3.97*  3.62*   HGB  11.7*  10.4*   HCT  34.7*  31.7*   PLT  247  242   MCV  87  88   MCH  29.5  28.7   MCHC  33.7  32.8     CMP  Recent Labs   Lab  09/18/18   1609  09/19/18   0623   CALCIUM  8.9  8.9   PROT  7.3  6.3   NA  137  136   K  3.2*  3.7   CO2  20*  20*   CL  107  108   BUN  8  4*   CREATININE  0.8  0.8   ALKPHOS  89  81   ALT  24  27   AST  37  31   BILITOT  0.5  0.9     UA  Recent Labs   Lab  09/18/18   1719   COLORU  Yellow   SPECGRAV  1.015   PHUR  6.0   PROTEINUA  Negative     MICRO  Microbiology Results (last 7 days)     Procedure Component Value Units Date/Time    Blood culture x two cultures. Draw prior to antibiotics. [[id number]] Collected:  09/18/18 1608    Order Status:  Completed Specimen:  Blood from Peripheral, Hand, Left Updated:  09/19/18 0515     Blood Culture, Routine No Growth to date    Narrative:       Aerobic and anaerobic    Blood culture x two cultures. Draw prior to antibiotics. [[id number]] Collected:  09/18/18 1609    Order Status:  Completed Specimen:  Blood from Peripheral, Upper Arm, Left Updated:  09/19/18 0515     Blood Culture, Routine No Growth to date    Narrative:       Aerobic and anaerobic    Urine culture [[id number]] Collected:  09/18/18 2131    Order Status:  No result Specimen:  Urine Updated:  09/18/18 2235    Urine culture [[id number]]     Order Status:  Completed Specimen:  Urine, Clean Catch         LIPID PANEL  Lab Results   Component Value Date    CHOL 225 (H) 10/04/2016     Lab Results   Component Value Date    HDL 62 10/04/2016     Lab Results   Component Value Date    LDLCALC 133.2 10/04/2016     Lab Results   Component Value Date    TRIG 149 10/04/2016     Lab Results   Component Value Date     CHOLHDL 27.6 10/04/2016       Diagnostic Results:  Imaging in last 24 hours:  1. No acute cardiopulmonary process.    ASSESSMENT/PLAN:     37 yo female w/ Crohn's disease w/ dumping and ileostomy, HTN, anxiety and recent hospital admission with chest pain, palpitations undergoing work-up for pheochromocytoma presents with SIRS, suspicion of infected infected subclavian port.    SIRS on admit  Cont zosyn and linezolid  Potential source of subclavian course  Cultures NGTD  WBC WNL  MIVF  Potassium normalized  Daily labs  Infectious disease consulted    Crohns Disease  Budesonide 9 mg daily (home med)  Missing f/u appointment with Dr. Ross today at [facility]  Consult ostomy care for supplies, assistance  Regular diet     Work-up for pheochromocytoma  Catecholamines, metanephrines WNL  Additional work-up pending as outpatient     DVT ppx: lovenox    Dispo: pending clinical improvement    9/19/2018 Nathaniel Escalante MD  1:12 PM

## 2018-09-19 NOTE — MEDICAL/APP STUDENT
"[facility] Infectious Disease Consult Note    Patient Name: Ivy Salgado  MRN: [id number]  Admission Date: 9/18/2018  Length of Stay: 0 days  Attending Physician: Saud Castaneda MD  Primary Care Physician: Kalia Astorga MD    HPI: Ms. Salgado is a 38 year old female with PMH of Crohn's Disease, HTN, anxiety, depression, and recurrent UTIs who presented to [facility] on 9/18/2018 with left sternal chest pain, associated with palpitations and fatigue. The patient described that she felt as if "her heart was going to beat out of her chest," rated the pain as 8-9 out of 10 and described that similar pain episodes occur episodically and usually occur with elevated heart rate and blood pressure.     Per ED note, the pt's palpitations began 20 minutes prior to her calling EMS, and she also experienced shaking, chills, and weakness. The pt was recently admitted to [facility] main [facility] for a similar presentation approximately 2 weeks ago, where she had a work-up performed to evaluate for pheochromocytoma and Carcinoid syndrome. The pt was scheduled to have a follow-up appointment in the work-up for possible pheochromocytoma but was instead admitted to [facility].     The pt has a left subclavian port that was inserted in February 2016 and which is used for administration of maintenance therapy infusions to treat Crohn's disease. Additionally, the patient normally receives intravenous fluid infusions to prevent dehydration, where she normally receives 2L normal saline weekly. Pt currently wearing a Holter monitor for ongoing work-up and has an ostomy bag in place after undergoing total colectomy. The pt reported that she had a very large ostomy output yesterday, much larger than normal, prior to presentation to the ED.     Review of Systems (prior to ED presentation)  General: +fever, chills, headaches  Eyes: denies blurry vision, double vision  HENT: denies hearing loss, tinnitus, nasal discharge, congestion, dysphagia, " odynophagia  Cardiovascular: +chest pain, + tachycardia, +palpitations  Pulmonary: +shortness of breath; denies cough and hemoptysis  Abdominal: +nausea and diarrhea; denies vomiting, constipation  Genitourinary: denies dysuria, hematuria  Musculoskeletal: +joint pains (hips, knees, ankles)  Endocrine: +cold intolerance; denies polyuria, polyphagia, polydipsia  Skin: +flushing; denies rash, easy bruising or easy bleeding  Neurological: +weakness, denies confusion, memory impairment,     Subjective  -Pt reports no acute events overnight  -Pt stated that she feels cold and continues to experience chills this morning  -Pt stated that nausea is improved compared to yesterday when she presented to ED; also states that chest pain and SOB are much better compared to initial presentation and denies cough    Objective  Vitals Signs  -Temp: [97.9F-100.8F]; 97.9F  -Pulse: [ bpm]; 89 bpm  -RR: [18-20 breaths/min]; 18 breaths/min  -BP: [(116-150 mmHg) / (66-84 mmHg)]; 123/83 mmHg  -SpO2: [97%-100%]; 99%    Physical Exam  -Constitutional: pt lying in bed under multiple blankets; shivering when uncovered; conversant  -Eyes: PERRLA, EOMI, no scleral icterus, no eye discharge  -HENT: head - atraumatic and normocephalic; neck - supple and normal ROM, no JVD; Mouth - moist mucous membranes and oropharynx clear  -Cardiovascular: Left subclavian port present with no erythema, discharge, or skin breaks surrounding it; regular rate and rhythm; normal S1 and S2 auscultated; no murmurs auscultated; 2+ radial pulses bilaterally  -Pulmonary: normal work of breathing; lungs clear to auscultation bilaterally; no wheezing auscultated   -Abdominal: no distension, ostomy bag present in RLQ near umbilicus with no erythema, discharge, or skin breaks in surrounding area ; normal bowel sounds auscultated in all 4 quadrants; diffuse tenderness on palpation   -Extremities: normal movement of all extremities; no edema   -Skin: no erythema, rash, or  jaundice present  -Neurological: Mental status - alert and oriented * 3; Cranial nerves - CN II - XII grossly intact; Sensory - sensation intact bilaterally in face, UEs, and LEs; Motor - 5/5 strength bilaterally in hand , UE flexion and extension, LE flexion and extension, and foot flexion; Cerebellum - normal heel to shin movements bilaterally; no evidence of dysdiadochokinesia or dysmetria  -Psychiatric: pleasant and cooperative mood and affect    Micro  Blood culture x two cultures. Draw prior to antibiotics.   Order: [id number]   Status:  Preliminary result   Visible to patient:  No (Not Released) Next appt:  Today at 12:00 PM in Gastroenterology (Parish Ross MD)   Specimen Information: Peripheral, Upper Arm, Left; Blood        Component 1d ago   Blood Culture, Routine No Growth to date P   Resulting Agency [facility]      Narrative   Performed by: [facility]   Aerobic and anaerobic      Specimen Collected: 09/18/18 16:09 Last Resulted: 09/19/18 05:15 Lab Flowsheet Order Details View Encounter Lab and Collection Details Routing Result History           Urine culture   Order: [id number]   Status:  In process   Visible to patient:  No (Not Released) Next appt:  Today at 12:00 PM in Gastroenterology (Parish Ross MD)   Specimen Information: Urine              Specimen Collected: 09/18/18 21:31 Last Resulted: 09/18/18 22:35 Order Details View Encounter Lab and Collection Details Routing Result History           Labs  CBC with Automated Differential   Order: [id number]   Status:  Final result   Visible to patient:  Yes (Patient Portal) Next appt:  Today at 12:00 PM in Gastroenterology (Parish Ross MD) Dx:  Sepsis; Crohn's disease of small inte...    Ref Range & Units 06:23 1d ago   WBC 3.90 - 12.70 K/uL 5.84  7.32    RBC 4.00 - 5.40 M/uL 3.62 Abnormally low   3.97 Abnormally low     Hemoglobin 12.0 - 16.0 g/dL 10.4 Abnormally low   11.7 Abnormally low     Hematocrit 37.0 - 48.5 % 31.7 Abnormally low   34.7 Abnormally  low     MCV 82 - 98 fL 88  87    MCH 27.0 - 31.0 pg 28.7  29.5    MCHC 32.0 - 36.0 g/dL 32.8  33.7    RDW 11.5 - 14.5 % 13.1  13.1    Platelets 150 - 350 K/uL 242  247    MPV 9.2 - 12.9 fL 9.4  9.9    Gran # (ANC) 1.8 - 7.7 K/uL 3.1  5.5    Lymph # 1.0 - 4.8 K/uL 2.1  1.1    Mono # 0.3 - 1.0 K/uL 0.6  0.7    Eos # 0.0 - 0.5 K/uL 0.1  0.0    Baso # 0.00 - 0.20 K/uL 0.01  0.01    Gran% 38.0 - 73.0 % 52.8  75.3 Abnormally high     Lymph% 18.0 - 48.0 % 36.0  15.0 Abnormally low     Mono% 4.0 - 15.0 % 9.6  9.2    Eosinophil% 0.0 - 8.0 % 1.2  0.3    Basophil% 0.0 - 1.9 % 0.2  0.1    Differential Method  Automated  Automated    Resulting Agency  [facility]      Narrative   Performed by: [facility]   Collection has been rescheduled by SF3 at 09/19/2018 05:28 Reason:    Nurse Draw; MIKE Martínez      Specimen Collected: 09/19/18 06:23 Last Resulted: 09/19/18 07:10 Lab Flowsheet Order Details View Encounter Lab and Collection Details Routing Result History           Comprehensive Metabolic Panel (CMP)   Order: [id number]   Status:  Final result   Visible to patient:  Yes (Patient Portal) Next appt:  Today at 12:00 PM in Gastroenterology (Parish Ross MD) Dx:  Sepsis; Crohn's disease of small inte...    Ref Range & Units 06:23 1d ago 13d ago 2wk ago   Sodium 136 - 145 mmol/L 136  137  140  141    Potassium 3.5 - 5.1 mmol/L 3.7  3.2 Abnormally low   3.7  3.7    Chloride 95 - 110 mmol/L 108  107  106  109    CO2 23 - 29 mmol/L 20 Abnormally low   20 Abnormally low   26  23    Glucose 70 - 110 mg/dL 171 Abnormally high   166 Abnormally high   95  95    BUN, Bld 6 - 20 mg/dL 4 Abnormally low   8  14  7    Creatinine 0.5 - 1.4 mg/dL 0.8  0.8  0.7  0.7    Calcium 8.7 - 10.5 mg/dL 8.9  8.9  9.0  9.5    Total Protein 6.0 - 8.4 g/dL 6.3  7.3  6.9  7.1    Albumin 3.5 - 5.2 g/dL 3.1 Abnormally low   3.6  3.4 Abnormally low   3.5    Total Bilirubin 0.1 - 1.0 mg/dL 0.9  0.5 CM 0.3 CM 0.2 CM   Comment: For infants and newborns, interpretation of  results should be based   on gestational age, weight and in agreement with clinical   observations.   Premature Infant recommended reference ranges:   Up to 24 hours.............<8.0 mg/dL   Up to 48 hours............<12.0 mg/dL   3-5 days..................<15.0 mg/dL   6-29 days.................<15.0 mg/dL    Alkaline Phosphatase 55 - 135 U/L 81  89  71  76    AST 10 - 40 U/L 31  37  12  14    ALT 10 - 44 U/L 27  24  8 Abnormally low   9 Abnormally low     Anion Gap 8 - 16 mmol/L 8  10  8  9    eGFR if African American >60 mL/min/1.73 m^2 >60  >60  >60.0  >60.0    eGFR if non African American >60 mL/min/1.73 m^2 >60  >60 CM >60.0 CM >60.0 CM   Comment: Calculation used to obtain the estimated glomerular filtration   rate (eGFR) is the CKD-EPI equation.    Resulting Agency  [facility]      Narrative   Performed by: [facility]   Collection has been rescheduled by SF3 at 09/19/2018 05:28 Reason:    Nurse Draw; MIKE Martínez      Specimen Collected: 09/19/18 06:23 Last Resulted: 09/19/18 07:25 Lab Flowsheet Order Details View Encounter Lab and Collection Details Routing Result History      CM=Additional comments             Magnesium: 1.7  Phosphorous: 3.0    Lactic acid, plasma   Order: [id number]   Status:  Final result   Visible to patient:  Yes (Patient Portal) Next appt:  Today at 12:00 PM in Gastroenterology (Parish Ross MD) Dx:  Sepsis    Ref Range & Units 06:23  (9/19/18) 1d ago  (9/18/18) 1d ago  (9/18/18) 1d ago  (9/18/18)   Lactate (Lactic Acid) 0.5 - 2.2 mmol/L 1.5  1.7 CM 1.6 CM 2.6 Abnormally high  CM   Comment: Falsely low lactic acid results can be found in samples   containing >=13.0 mg/dL total bilirubin and/or >=3.5 mg/dL   direct bilirubin.    Resulting Agency  [facility]      Narrative   Performed by: [facility]   Collection has been rescheduled by SF3 at 09/19/2018 05:28 Reason:    Nurse Draw; MIKE Martínez      Specimen Collected: 09/19/18 06:23 Last Resulted: 09/19/18 07:19 Lab Flowsheet Order  Details View Encounter Lab and Collection Details Routing Result History           Imaging  X-Ray Chest AP Portable   Order: [id number]   Status:  Final result   Visible to patient:  No (Not Released) Next appt:  Today at 12:00 PM in Gastroenterology (Parish Ross MD)   Details     Reading Physician Reading Date Result Priority   Kelby Solano MD 9/18/2018       Narrative     EXAMINATION:  XR CHEST AP PORTABLE    CLINICAL HISTORY:  Sepsis;    TECHNIQUE:  Single frontal view of the chest was performed.    COMPARISON:  08/30/2018    FINDINGS:  Left chest wall port catheter tip projects over the mid to distal SVC.  The cardiomediastinal silhouette is within normal limits.  There is no pleural effusion.  The trachea is midline.  The lungs are symmetrically expanded bilaterally with coarse interstitial attenuation, similar to the previous exam.  There may be scattered calcified granulomas..  No large focal consolidation seen.  There is no pneumothorax.  The osseous structures are remarkable for dextroscoliotic curvature of the thoracic spine.  Curvature of the midshaft left clavicle may reflect sequela of previous injury..      Impression       1. No acute cardiopulmonary process.      Electronically signed by: Kelby Solano MD  Date: 09/18/2018  Time: 16:24             Last Resulted: 09/18/18 16:24 Order Details View Encounter Lab and Collection Details Routing Result History           Assessment  -Ms. Salgado is a 38 year old female with a PMH of Crohn's Disease, multiple abdominal surgeries, HTN, anxiety, depression, and recurrent UTIs who presented to [facility] on 9/18/2018 with left sternal chest pain associated with palpitations, weakness, and chills likely 2/2 underlying pheochromocytoma or Carcinoid syndrome vs infectious process.      Plan  SIRS  -ED vitals on presentation: Temp 100.8F,  bpm, RR 18 breaths / min, /77 mmHg, O2 sat 99% and WBC count on initial CBC was 7.32; patient presented  with 2/4 SIRS criteria  -Pt history of Crohn's Disease with indwelling left subclavian port placement concerning for infection as source of positive SIRS criteria  -Pt received 1 dose of Zosyn 4.5g and Linezolid 600 mg while in ED; started on regimen of Zosyn 4.5g Q8hrs and Linezolid 600 mg Q12hrs; continue abx regimen  -Blood and urine cultures collected in ED; No growth to date for either culture; continue to follow for culture results  -Diffuse abdominal tenderness noted on PE: Recommend CT abdomen or US abdomen to evaluate for possible abdominal infectious source   -Recommend repeat blood cultures from indwelling left subclavian port as indwelling device possible source of infection-      Rasheed Alegre Jr.  Medical Student  [facility]

## 2018-09-19 NOTE — PROGRESS NOTES
.Pharmacy New Medication Education    Patient accepted medication education.    Pharmacy educated patient on name and purpose of medications and possible side effects, using the teach-back method.     Current Inpatient Medication Orders   acetaminophen tablet 650 mg   ALPRAZolam tablet 1 mg   budesonide capsule 9 mg   clindamycin 2 % vaginal cream 1 applicator   dextrose 5 % and 0.45 % NaCl with KCl 20 mEq infusion   dextrose 50% injection 12.5 g   dextrose 50% injection 25 g   diphenoxylate-atropine 2.5-0.025 mg per tablet 1 tablet   dronabinol capsule 5 mg   ergocalciferol capsule 50,000 Units   glucagon (human recombinant) injection 1 mg   glucose chewable tablet 16 g   glucose chewable tablet 24 g   linezolid 600mg/300ml 600 mg/300 mL IVPB 600 mg   loperamide capsule 2 mg   metoprolol succinate (TOPROL-XL) 24 hr tablet 25 mg   norethindrone-ethinyl estradiol 1 mg-20 mcg (21)/75 mg (7) per tablet 1 tablet   oxyCODONE-acetaminophen  mg per tablet 1 tablet   piperacillin-tazobactam 4.5 g in dextrose 5 % 100 mL IVPB (ready to mix system)   promethazine tablet 25 mg   sodium chloride 0.9% flush 5 mL   zolpidem tablet 5 mg       Learners of pharmacy medication education included:  Patient    Patient +/- learner response:  Verbalized Understanding, Teachback

## 2018-09-19 NOTE — PROGRESS NOTES
Pharmacist Renal Dose Adjustment Note    Ivy Salgado is a 38 y.o. female being treated with the medication enoxaparin    Patient Data:    Vital Signs (Most Recent):  Temp: 98.7 °F (37.1 °C) (09/19/18 1153)  Pulse: 65 (09/19/18 1200)  Resp: 16 (09/19/18 1153)  BP: 108/68 (09/19/18 1153)  SpO2: 99 % (09/19/18 0854) Vital Signs (72h Range):  Temp:  [97.9 °F (36.6 °C)-100.8 °F (38.2 °C)]   Pulse:  []   Resp:  [16-20]   BP: (108-150)/(66-84)   SpO2:  [97 %-100 %]      Recent Labs   Lab  09/18/18   1609  09/19/18   0623   CREATININE  0.8  0.8     Serum creatinine: 0.8 mg/dL 09/19/18 0623  Estimated creatinine clearance: 68.9 mL/min    Medication:enoxaparin  dose:30 mg frequency q24h will be changed to medication:enoxaparin  dose:40 mg frequency:q24h    Pharmacist's Name: Risa Rivas  Pharmacist's Extension: [id number]

## 2018-09-19 NOTE — PROGRESS NOTES
[facility] Medicine  Progress Note    Patient Name: Ivy Salgado  MRN: [id number]  Patient Class: IP- Inpatient   Admission Date: 9/18/2018  Length of Stay: 1 days  Attending Physician: Saud Castaneda MD  Primary Care Provider: Kalia Astorga MD        Subjective:     Principal Problem:SIRS (systemic inflammatory response syndrome)    HPI:  Ms. Ivy Salgado is a 38 year old white female who lives in [place], the patient is a nurse at [employer]. The patient has a past medical history of Crohn's disease, anxiety, hypertension, an Abnormal Pap smear in 2007 and in 2011, Anemia,  Arthritis, C. difficile diarrhea, Depression, blood transfusion, Genital HSV, Kidney stones, recurrent UTI. The patient has a past surgical history of ileostomy on 7/9/2012 and Sterilization in 6/23/2012.The patient is being followed by Dr. Phu Obrien with Urology, Dr. Kalia Astorga with Internal Medicine, Dr. Parish Ross with Gastroenterology, Dr. Alix Morales with OBGYN, Dr. Paris Lazaro with Endocrinology, and Dr. John Saucedo with Cardiology services. The patient presents to the ED with complaints of left sternal chest pain; the patient states that it feels like her heart is going to beat out of her chest. The patient states the symptoms are associated with palpitations and generalized fatigue.  Per the ED notes the patients chest pain started at rest and was relieved when her heart rate improved after fluids for SIRS. The patient has a left chest wall port in place for maintenance IV therapy. The patient states she receives 2 Liters of  Normal saline IV once a week to prevent dehydration; the patient states she usually receives IV therapy on Thursdays. The patient reported she intermittently has fevers and the patient states she has been having the tachycardia episodes since February of 2018. The patient states this is the third time this has happened. The patient was recently  admitted at [facility] two weeks ago for a similar presentation although without fever. At the [facility] the  cardiac work-up was negative, including cardiac MRI. The patient is currently wearing a Holter monitor with work-up ongoing.  The patient has reported a decrease in appetite with a 8 pound weight lost  in the last month; the patient reported her weight went from 106 lbs to 98. The patient also states she has been dumping a significant amount of stool from her ostomy site. The patient was supposed to have a follow-up appointment for potential pheo, but was admitted instead. In the Ed the patient has received IV  Fluids, blood and urine cultures were drawn and patient received broad spectrum Abx (zosyn and linezolid due to prior bad reaction to vanco). No growth to date noted with the blood cultures. The patients WBCs were normal in the ED however the patient has had a temp; temp range 97.9-100.8. ID has been consulted will follow for IDs recommendations. The patient will also be followed by [facility] Medicine service while inpatient.        Hospital Course:  No notes on file    Interval History: The patient is sitting up in bed AAOx4, no distress noted no complaints of pain noted at this time. The patient does complain of tremors from time to time, but the patient reports this is not new. No family at the bedside, will follow.    Review of Systems   Constitutional: Positive for activity change (The patient reports feeling fatigued and unable to do much because she has been very sleepy.), appetite change (The patient reports a decreased appetite.), fatigue (The patient reports fatigue.), fever (The patient reports low grade temps at home. ) and unexpected weight change (The patient reports a 8 pound weight lost within the past month). Negative for chills and diaphoresis.   HENT: Negative for congestion, facial swelling, sinus pressure, sinus pain, sneezing and sore  throat.    Eyes: Negative for discharge and itching.   Respiratory: Positive for shortness of breath (The patient reports SOB at home when her heart rate is elevated but denies SOB at this time. ). Negative for cough, chest tightness and wheezing.    Cardiovascular: Positive for chest pain (The patient reports having chest pain at home and in the ED but none at this time. ) and palpitations (The patient reports experiencing palpitations at home, none at this time. Holter monitor in place. ).   Gastrointestinal: Positive for diarrhea (The patient reports significant diarrhea from ostomy.) and nausea (The patient has reported nausea with no vomiting at home. ). Negative for abdominal distention, abdominal pain, blood in stool and vomiting.   Endocrine: Negative for polydipsia, polyphagia and polyuria.   Genitourinary: Negative for difficulty urinating, hematuria, pelvic pain and urgency.   Musculoskeletal: Positive for back pain (The patient reports right flank discomfort, she states it does not really hurt.). Negative for arthralgias, gait problem, myalgias and neck pain.   Skin: Negative for color change, rash and wound.   Neurological: Positive for tremors (The patient states she has been experiencing tremors.  ) and weakness. Negative for dizziness, syncope, facial asymmetry, speech difficulty, light-headedness, numbness and headaches.   Hematological: Does not bruise/bleed easily.   Psychiatric/Behavioral: Negative for agitation and confusion. The patient is not nervous/anxious.      Objective:     Vital Signs (Most Recent):  Temp: 98.9 °F (37.2 °C) (09/19/18 1645)  Pulse: 68 (09/19/18 1645)  Resp: 16 (09/19/18 1645)  BP: 113/77 (09/19/18 1645)  SpO2: 99 % (09/19/18 0854) Vital Signs (24h Range):  Temp:  [97.9 °F (36.6 °C)-100.6 °F (38.1 °C)] 98.9 °F (37.2 °C)  Pulse:  [] 68  Resp:  [16-20] 16  SpO2:  [97 %-100 %] 99 %  BP: (108-138)/(66-84) 113/77     Weight: 44.9 kg (99 lb)  Body mass index is 18.71  kg/m².    Intake/Output Summary (Last 24 hours) at 9/19/2018 1704  Last data filed at 9/19/2018 1018  Gross per 24 hour   Intake 1735 ml   Output 200 ml   Net 1535 ml      Physical Exam   Constitutional: She is oriented to person, place, and time. She appears well-developed and well-nourished. No distress.   HENT:   Head: Normocephalic and atraumatic.   Eyes: Pupils are equal, round, and reactive to light.   Neck: Normal range of motion. Neck supple.   Cardiovascular: Normal rate, regular rhythm, normal heart sounds and intact distal pulses.   No murmur heard.  Holter monitor in place.   Pulmonary/Chest: Effort normal and breath sounds normal. No respiratory distress. She has no wheezes.   Abdominal: Soft. Bowel sounds are normal. She exhibits no distension. There is tenderness (Mild abd tenderness on palpation). There is no guarding.   Right lower quad Ileostomy noted.    Musculoskeletal: Normal range of motion.   Some weakness noted.   Neurological: She is alert and oriented to person, place, and time.   Skin: Skin is warm and dry. Capillary refill takes less than 2 seconds. She is not diaphoretic.   Left CW port in place.    Psychiatric: She has a normal mood and affect. Her behavior is normal. Judgment and thought content normal.   Nursing note and vitals reviewed.      Significant Labs:   Recent Lab Results       09/19/18  0623 09/18/18  2353 09/18/18 2016 09/18/18  1719      Albumin 3.1        Alkaline Phosphatase 81        ALT 27        Anion Gap 8        Appearance, UA    Clear     AST 31        Baso # 0.01        Basophil% 0.2        Bilirubin (UA)    Negative     Total Bilirubin 0.9  Comment:  For infants and newborns, interpretation of results should be based  on gestational age, weight and in agreement with clinical  observations.  Premature Infant recommended reference ranges:  Up to 24 hours.............<8.0 mg/dL  Up to 48 hours............<12.0 mg/dL  3-5 days..................<15.0 mg/dL  6-29  days.................<15.0 mg/dL          BUN, Bld 4        Calcium 8.9        Chloride 108        CO2 20        Color, UA    Yellow     Creatinine 0.8        Differential Method Automated        eGFR if  >60        eGFR if non  >60  Comment:  Calculation used to obtain the estimated glomerular filtration  rate (eGFR) is the CKD-EPI equation.           Eos # 0.1        Eosinophil% 1.2        Glucose 171        Glucose, UA    Negative     Gran # (ANC) 3.1        Gran% 52.8        Hematocrit 31.7        Hemoglobin 10.4        Ketones, UA    Negative     Lactate, Favian 1.5  Comment:  Falsely low lactic acid results can be found in samples   containing >=13.0 mg/dL total bilirubin and/or >=3.5 mg/dL   direct bilirubin.   1.7  Comment:  Falsely low lactic acid results can be found in samples   containing >=13.0 mg/dL total bilirubin and/or >=3.5 mg/dL   direct bilirubin.   1.6  Comment:  Falsely low lactic acid results can be found in samples   containing >=13.0 mg/dL total bilirubin and/or >=3.5 mg/dL   direct bilirubin.        Leukocytes, UA    Negative     Lymph # 2.1        Lymph% 36.0        Magnesium 1.7        MCH 28.7        MCHC 32.8        MCV 88        Mono # 0.6        Mono% 9.6        MPV 9.4        Nitrite, UA    Negative     Occult Blood UA    Negative     pH, UA    6.0     Phosphorus 3.0        Platelets 242        Potassium 3.7        Total Protein 6.3        Protein, UA    Negative  Comment:  Recommend a 24 hour urine protein or a urine   protein/creatinine ratio if globulin induced proteinuria is  clinically suspected.       RBC 3.62        RDW 13.1        Sodium 136        Specific Edgar, UA    1.015     Specimen UA    Urine, Clean Catch     Urobilinogen, UA    Negative     WBC 5.84              Significant Imaging: I have reviewed and interpreted all pertinent imaging results/findings within the past 24 hours.    Assessment/Plan:      * SIRS (systemic inflammatory  response syndrome)    Will cont to follow IDs recommendations. Still awaiting urine cultures that are pending but blood cultures show no growth to date. Patient is currently on Linezolid 600 mg IV q 12 and Zosyn 4.5 g q 8 hours. Will follow any recommendations from ID, WBC 5.84, temp range 97.9-100.8, patient was tachycardic in the ED heart rate range . Will cont to follow blood/urine cultures. Per ID -because of abdominal tenderness ID recommends a CT abdomen or US abdomen to evaluate for possible abdominal infectious source. Also ID is recommending repeat blood cultures from indwelling left subclavian port as indwelling device possible source of infection. Will consider recommendations.               UTI due to extended-spectrum beta lactamase (ESBL) producing Escherichia coli              Cystisis due to ESBL-producing Escherichia coli    UTI d/t extended-spectrum beta lactamase producing E. Coli    Urinalysis is WNL, no complaints at this time. The patient has chronic UTIs in the past but U/A is Ok at this time.         Current mild episode of major depressive disorder without prior episode    Chronic.  Home dose of Alaprazolam 1 mg PO restarted.        Crohn's disease of small intestine with complication    Ileostomy in place, patient is complaining of mild abd tenderness.           Essential hypertension, benign    Chronic. The patient is currently not taking anything for hypertension. SBP range 108-116. The patient is on Metoprolol 25 mg daily for palpitations that has been resumed.        S/P ileostomy    Chronic.  Will have wound care provide the patient with ostomy supplies for Ileostomy care.          VTE Risk Mitigation (From admission, onward)        Ordered     enoxaparin injection 40 mg  Daily      09/19/18 1326     IP VTE LOW RISK PATIENT  Once      09/18/18 2346     Place JEANNA hose  Until discontinued      09/18/18 2346              Susi Phelan NP  Department of Hospital Medicine    [facility]

## 2018-09-20 ENCOUNTER — PATIENT MESSAGE (OUTPATIENT)
Dept: INTERNAL MEDICINE | Facility: CLINIC | Age: 38
End: 2018-09-20

## 2018-09-20 VITALS
SYSTOLIC BLOOD PRESSURE: 104 MMHG | TEMPERATURE: 99 F | HEIGHT: 61 IN | WEIGHT: 109.13 LBS | OXYGEN SATURATION: 100 % | RESPIRATION RATE: 16 BRPM | BODY MASS INDEX: 20.6 KG/M2 | DIASTOLIC BLOOD PRESSURE: 60 MMHG | HEART RATE: 80 BPM

## 2018-09-20 PROBLEM — E86.9 VOLUME DEPLETION, GASTROINTESTINAL LOSS: Chronic | Status: RESOLVED | Noted: 2018-02-16 | Resolved: 2018-09-20

## 2018-09-20 PROBLEM — R07.9 CHEST PAIN: Status: RESOLVED | Noted: 2018-02-03 | Resolved: 2018-09-20

## 2018-09-20 PROBLEM — A68.9 RECURRENT FEVER: Chronic | Status: RESOLVED | Noted: 2018-09-19 | Resolved: 2018-09-20

## 2018-09-20 PROCEDURE — 63600175 PHARM REV CODE 636 W HCPCS: Performed by: HOSPITALIST

## 2018-09-20 PROCEDURE — 25000003 PHARM REV CODE 250: Performed by: NURSE PRACTITIONER

## 2018-09-20 PROCEDURE — 25000003 PHARM REV CODE 250: Performed by: HOSPITALIST

## 2018-09-20 PROCEDURE — 25000003 PHARM REV CODE 250: Performed by: STUDENT IN AN ORGANIZED HEALTH CARE EDUCATION/TRAINING PROGRAM

## 2018-09-20 PROCEDURE — 83993 ASSAY FOR CALPROTECTIN FECAL: CPT

## 2018-09-20 PROCEDURE — 63600175 PHARM REV CODE 636 W HCPCS: Performed by: STUDENT IN AN ORGANIZED HEALTH CARE EDUCATION/TRAINING PROGRAM

## 2018-09-20 RX ORDER — HEPARIN 100 UNIT/ML
5 SYRINGE INTRAVENOUS
Status: DISCONTINUED | OUTPATIENT
Start: 2018-09-20 | End: 2018-09-20 | Stop reason: HOSPADM

## 2018-09-20 RX ORDER — AMOXICILLIN AND CLAVULANATE POTASSIUM 875; 125 MG/1; MG/1
1 TABLET, FILM COATED ORAL EVERY 12 HOURS
Qty: 20 TABLET | Refills: 0 | Status: SHIPPED | OUTPATIENT
Start: 2018-09-20 | End: 2018-09-30

## 2018-09-20 RX ORDER — AMOXICILLIN AND CLAVULANATE POTASSIUM 875; 125 MG/1; MG/1
1 TABLET, FILM COATED ORAL EVERY 12 HOURS
Status: DISCONTINUED | OUTPATIENT
Start: 2018-09-20 | End: 2018-09-20 | Stop reason: HOSPADM

## 2018-09-20 RX ADMIN — OXYCODONE HYDROCHLORIDE AND ACETAMINOPHEN 1 TABLET: 10; 325 TABLET ORAL at 05:09

## 2018-09-20 RX ADMIN — ALPRAZOLAM 1 MG: 1 TABLET ORAL at 08:09

## 2018-09-20 RX ADMIN — BUDESONIDE 9 MG: 3 CAPSULE ORAL at 08:09

## 2018-09-20 RX ADMIN — DEXTROSE MONOHYDRATE, SODIUM CHLORIDE, AND POTASSIUM CHLORIDE: 50; 4.5; 1.49 INJECTION, SOLUTION INTRAVENOUS at 12:09

## 2018-09-20 RX ADMIN — PROMETHAZINE HYDROCHLORIDE 25 MG: 25 TABLET ORAL at 05:09

## 2018-09-20 RX ADMIN — AMOXICILLIN AND CLAVULANATE POTASSIUM 1 TABLET: 875; 125 TABLET, FILM COATED ORAL at 12:09

## 2018-09-20 RX ADMIN — METOPROLOL SUCCINATE 25 MG: 25 TABLET, EXTENDED RELEASE ORAL at 08:09

## 2018-09-20 RX ADMIN — NORETHINDRONE ACETATE AND ETHINYL ESTRADIOL 1 TABLET: KIT at 09:09

## 2018-09-20 RX ADMIN — DRONABINOL 5 MG: 2.5 CAPSULE ORAL at 08:09

## 2018-09-20 RX ADMIN — HEPARIN SODIUM (PORCINE) LOCK FLUSH IV SOLN 100 UNIT/ML 500 UNITS: 100 SOLUTION at 12:09

## 2018-09-20 NOTE — ASSESSMENT & PLAN NOTE
Appreciates IDs recommendations. Per ID: Chest pain, palpitations, tremors, etc. - No evidence of infection associated with presenting symptoms, No need for antibiotic therapy, Continue work-up for carcinoid, pheochromocytoma, etc. ID has signed off for now. Still awaiting urine cultures that are pending but blood cultures show no growth to date. IV ABX has been stopped.  WBC 5.84, temp range overnight  97.2-98.4, patient is not tachycardic, heart rate range 65-80. Will cont to follow blood/urine cultures. Will discharge the patient on today, patient to f/u with PCP and Cardiology.

## 2018-09-20 NOTE — ASSESSMENT & PLAN NOTE
Ileostomy in place, no complaints of abd tenderness. Volume depletion has resolved will d/c patient to home on today.

## 2018-09-20 NOTE — NURSING
Plan of care reviewed with patient, understanding verbalized. No complaints of pain at this time. D5W 1/2 NS 20 K mEq infusing at 100ml/hr. Bed alarm on, call light in reach, fall precautions in place. No distress noted. Will continue to monitor

## 2018-09-20 NOTE — PLAN OF CARE
Future Appointments   Date Time Provider Department Center   9/21/2018  9:30 AM CHAIR 04 [facility]MO [facility]   10/11/2018 10:45 AM Alix Morales MD [facility] OBGYN [facility]   3/13/2019  9:15 AM Phu Obrien MD [facility] UROLOGY [facility]     Discharge rounds on patient. Discussed followup appointments, blue discharge folder, discharge nurse will go over home medications and reasons for medications and final discharge instructions. All patient/caregiver questions answered. Patient verbalized understanding.         09/20/18 1146   Final Note   Assessment Type Final Discharge Note   Discharge Disposition Home   Hospital Follow Up  Appt(s) scheduled? Yes  (left message with PCP and they will call patient with followup appt )   Discharge plans and expectations educations in teach back method with documentation complete? Yes   Right Care Referral Info   Post Acute Recommendation No Care     Dot Salvador, RN, CCM, CMSRN  RN Transition Navigator  [phone]

## 2018-09-20 NOTE — PROGRESS NOTES
.Pharmacy New Medication Education    Patient accepted medication education.    Pharmacy educated patient on name and purpose of medications and possible side effects, using the teach-back method.     Current Inpatient Medication Orders   acetaminophen tablet 650 mg   ALPRAZolam tablet 1 mg   budesonide capsule 9 mg   clindamycin 2 % vaginal cream 1 applicator   dextrose 5 % and 0.45 % NaCl with KCl 20 mEq infusion   dextrose 50% injection 12.5 g   dextrose 50% injection 25 g   diphenoxylate-atropine 2.5-0.025 mg per tablet 1 tablet   dronabinol capsule 5 mg   enoxaparin injection 40 mg   ergocalciferol capsule 50,000 Units   glucagon (human recombinant) injection 1 mg   glucose chewable tablet 16 g   glucose chewable tablet 24 g   loperamide capsule 2 mg   metoprolol succinate (TOPROL-XL) 24 hr tablet 25 mg   norethindrone-ethinyl estradiol 1 mg-20 mcg (21)/75 mg (7) per tablet 1 tablet   oxyCODONE-acetaminophen  mg per tablet 1 tablet   promethazine tablet 25 mg   sodium chloride 0.9% flush 5 mL   zolpidem tablet 5 mg       Learners of pharmacy medication education included:  Patient    Patient +/- learner response:  Verbalized Understanding, Teachback

## 2018-09-20 NOTE — HOSPITAL COURSE
9/20/18: ID has seen the patient. We appreciate IDs recommendations and f/u. Per ID: Chest pain, palpitations, tremors, etc. - No evidence of infection associated with presenting symptoms,No need for antibiotic therapy, no tachycardia noted, SBP range Continue work-up for carcinoid, pheochromocytoma, etc. At this time ID has signed off. Will cont to monitor ostomy output and will cont IV fluids for hydration. Afebrile throughout the night, SBP range 108-119, Holter monitor is in place, will discharge the patient to home. The patient to f/u with PCP and Cardiology in 1 week.

## 2018-09-20 NOTE — DISCHARGE SUMMARY
[facility] Hospital Medicine  Discharge Summary      Patient Name: Ivy Salgado  MRN: [id number]  Admission Date: 9/18/2018  Hospital Length of Stay: 2 days  Discharge Date and Time:  09/20/2018 8:45 AM  Attending Physician: Saud Castaneda MD   Discharging Provider: Susi Phelan NP  Primary Care Provider: Kalia Astorga MD      HPI:   Ms. Ivy Salgado is a 38 year old white female who lives in [place], the patient is a nurse at [employer]. The patient has a past medical history of Crohn's disease, anxiety, hypertension, an Abnormal Pap smear in 2007 and in 2011, Anemia,  Arthritis, C. difficile diarrhea, Depression, blood transfusion, Genital HSV, Kidney stones, recurrent UTI. The patient has a past surgical history of ileostomy on 7/9/2012 and Sterilization in 6/23/2012.The patient is being followed by Dr. Phu Obrien with Urology, Dr. Kalia Astorga with Internal Medicine, Dr. Parish Ross with Gastroenterology, Dr. Alix Morales with OBGYN, Dr. Paris Lazaro with Endocrinology, and Dr. John Saucedo with Cardiology services. The patient presents to the ED with complaints of left sternal chest pain; the patient states that it feels like her heart is going to beat out of her chest. The patient states the symptoms are associated with palpitations and generalized fatigue.  Per the ED notes the patients chest pain started at rest and was relieved when her heart rate improved after fluids for SIRS. The patient has a left chest wall port in place for maintenance IV therapy. The patient states she receives 2 Liters of  Normal saline IV once a week to prevent dehydration; the patient states she usually receives IV therapy on Thursdays. The patient reported she intermittently has fevers and the patient states she has been having the tachycardia episodes since February of 2018. The patient states this is the third time this has happened. The patient was recently  admitted at [facility] two weeks ago for a similar presentation although without fever. At the [facility] the  cardiac work-up was negative, including cardiac MRI. The patient is currently wearing a Holter monitor with work-up ongoing.  The patient has reported a decrease in appetite with a 8 pound weight lost  in the last month; the patient reported her weight went from 106 lbs to 98. The patient also states she has been dumping a significant amount of stool from her ostomy site. The patient was supposed to have a follow-up appointment for potential pheo, but was admitted instead. In the Ed the patient has received IV  Fluids, blood and urine cultures were drawn and patient received broad spectrum Abx (zosyn and linezolid due to prior bad reaction to vanco). No growth to date noted with the blood cultures. The patients WBCs were normal in the ED however the patient has had a temp; temp range 97.9-100.8. ID has been consulted will follow for IDs recommendations. The patient will also be followed by [facility] Medicine service while inpatient.        * No surgery found *      Hospital Course:   9/20/18: ID has seen the patient. We appreciate IDs recommendations and f/u. Per ID: Chest pain, palpitations, tremors, etc. - No evidence of infection associated with presenting symptoms,No need for antibiotic therapy, no tachycardia noted, SBP range Continue work-up for carcinoid, pheochromocytoma, etc. At this time ID has signed off. Will cont to monitor ostomy output and will cont IV fluids for hydration. Afebrile throughout the night, SBP range 108-119, Holter monitor is in place, will discharge the patient to home. The patient to f/u with PCP and Cardiology in 1 week.      Consults:   Consults (From admission, onward)        Status Ordering Provider     Inpatient consult to Infectious Diseases  Once     Provider:  (Not yet assigned)    Completed TOYIN MOSLEY          *  Volume depletion, gastrointestinal loss-resolved as of 9/20/2018    Appreciates IDs recommendations. Per ID: Chest pain, palpitations, tremors, etc. - No evidence of infection associated with presenting symptoms, No need for antibiotic therapy, Continue work-up for carcinoid, pheochromocytoma, etc. ID has signed off for now. Still awaiting urine cultures that are pending but blood cultures show no growth to date. IV ABX has been stopped.  WBC 5.84, temp range overnight  97.2-98.4, patient is not tachycardic, heart rate range 65-80. Will cont to follow blood/urine cultures. Will discharge the patient on today, patient to f/u with PCP and Cardiology.          Current chronic use of systemic steroids    Chronic.          Current mild episode of major depressive disorder without prior episode    Chronic.  Home dose of Alaprazolam 1 mg PO restarted.        Crohn's disease of small intestine with complication    Ileostomy in place, no complaints of abd tenderness. Volume depletion has resolved will d/c patient to home on today.           Essential hypertension, benign    Chronic. The patient is currently not taking anything for hypertension. SBP range 108-119. The patient is on Metoprolol 25 mg daily for palpitations that has been resumed.        S/P ileostomy    Chronic.  Will have wound care provide the patient with ostomy supplies for Ileostomy care.        Recurrent fever-resolved as of 9/20/2018    Chronic. Afebrile overnight        Chest pain-resolved as of 9/20/2018    Chronic. No CP overnight.            Final Active Diagnoses:    Diagnosis Date Noted POA    Current chronic use of systemic steroids [Z79.52] 02/03/2018 Not Applicable    Current mild episode of major depressive disorder without prior episode [F32.0] 08/05/2017 Yes    Crohn's disease of small intestine with complication [K50.019] 02/21/2017 Yes    Essential hypertension, benign [I10] 03/17/2016 Yes     Chronic    S/P ileostomy [Z93.2] 07/09/2012  Not Applicable     Chronic      Problems Resolved During this Admission:    Diagnosis Date Noted Date Resolved POA    PRINCIPAL PROBLEM:  Volume depletion, gastrointestinal loss [E86.9] 02/16/2018 09/20/2018 Yes     Chronic    Recurrent fever [A68.9] 09/19/2018 09/20/2018 Yes     Chronic    Chest pain [R07.9] 02/03/2018 09/20/2018 Yes       Discharged Condition: good    Disposition: Home or Self Care    Follow Up:  Follow-up Information     Kalia Astorga MD In 1 week.    Specialty:  Internal Medicine  Contact information:  [address]  [facility] [address]  [phone]             John Saucedo MD In 1 week.    Specialty:  Cardiovascular Disease  Contact information:  [address]  [facility] [address]  [phone]                 Patient Instructions:      Diet Adult Regular     Notify your health care provider if you experience any of the following:  temperature >100.4     Notify your health care provider if you experience any of the following:  persistent nausea and vomiting or diarrhea     Notify your health care provider if you experience any of the following:  severe uncontrolled pain     Notify your health care provider if you experience any of the following:  difficulty breathing or increased cough     Notify your health care provider if you experience any of the following:  severe persistent headache     Notify your health care provider if you experience any of the following:  persistent dizziness, light-headedness, or visual disturbances     Notify your health care provider if you experience any of the following:  increased confusion or weakness     Activity as tolerated       Significant Diagnostic Studies: Labs: All labs within the past 24 hours have been reviewed    Pending Diagnostic Studies:     Procedure Component Value Units Date/Time    Calprotectin [[id number]] Collected:  09/20/18 0408    Order Status:  Sent Lab Status:  In process Updated:  09/20/18 0408     Specimen:  Stool          Medications:  Reconciled Home Medications:      Medication List      CONTINUE taking these medications    ALPRAZolam 1 MG tablet  Commonly known as:  XANAX  TAKE 1 TABLET BY MOUTH TWICE DAILY AS NEEDED FOR ANXIETY     biotin 1,000 mcg Chew  Take 1 tablet by mouth once daily.     budesonide 3 mg capsule  Commonly known as:  ENTOCORT EC  Take 3 capsules (9 mg total) by mouth once daily.     clindamycin 2 % vaginal cream  Commonly known as:  CLINDESSE  PLACE VAGINALLY EVERY EVENING     diphenoxylate-atropine 2.5-0.025 mg 2.5-0.025 mg per tablet  Commonly known as:  LOMOTIL  Take 1 tablet by mouth 4 (four) times daily as needed for Diarrhea.     dronabinol 5 MG capsule  Commonly known as:  MARINOL  Take 1 capsule (5 mg total) by mouth 2 (two) times daily before meals.     ergocalciferol 50,000 unit Cap  Commonly known as:  ERGOCALCIFEROL  Take 1 capsule (50,000 Units total) by mouth every 7 days.     FLUZONE QUAD 0928-8816 (PF) 60 mcg (15 mcg x 4)/0.5 mL Syrg  Generic drug:  flu vac [id number] 36mos up(PF)  inject into the muscle     loperamide 2 mg capsule  Commonly known as:  IMODIUM  Take 2 mg by mouth daily as needed for Diarrhea.     magnesium 250 mg Tab  Take by mouth once.     metoprolol succinate 25 MG 24 hr tablet  Commonly known as:  TOPROL-XL  Take 1 tablet (25 mg total) by mouth 2 (two) times daily.     norethindrone-ethinyl estradiol 1 mg-20 mcg (21)/75 mg (7) per tablet  Commonly known as:  JUNEL FE 1/20  Take 1 tablet by mouth once daily.     ONE DAILY MULTIVITAMIN per tablet  Generic drug:  multivitamin  Take 1 tablet by mouth once daily.     oxyCODONE-acetaminophen  mg per tablet  Commonly known as:  PERCOCET  Take 1 tablet by mouth every 6 (six) hours as needed for Pain.     potassium citrate 15 mEq Tbsr  Commonly known as:  UROCIT-K 15  Take 2 tablets by mouth 2 (two) times daily.     promethazine 25 MG tablet  Commonly known as:  PHENERGAN  Take 1 tablet (25 mg total)  by mouth every 6 (six) hours as needed.     terconazole 0.4 % Crea  Commonly known as:  TERAZOL 7  INSERT ONE APPLICATORFUL VAGINALLY AT BEDTIME     zolpidem 10 mg Tab  Commonly known as:  AMBIEN  Take 1 tablet (10 mg total) by mouth every evening.        STOP taking these medications    sumatriptan 100 MG tablet  Commonly known as:  IMITREX     valACYclovir 500 MG tablet  Commonly known as:  VALTREX            Indwelling Lines/Drains at time of discharge:   Lines/Drains/Airways     Drain                 Ileostomy 06/16/12 0000 RLQ 2287 days                Time spent on the discharge of patient: 35 minutes  Patient was seen and examined on the date of discharge and determined to be suitable for discharge.         Susi Phelan NP  Department of Hospital Medicine  [facility]

## 2018-09-20 NOTE — ASSESSMENT & PLAN NOTE
Chronic. The patient is currently not taking anything for hypertension. SBP range 108-119. The patient is on Metoprolol 25 mg daily for palpitations that has been resumed.

## 2018-09-20 NOTE — NURSING
Patient d/c home d/c instructions given to the patient Patient verbalized understanding. Patient advised to start Amoxicillin and that the RX was sent to the [facility] [address]. Patient advised to stop the Imitrex and the Valtrex. Education given, refer to clinical reference for education. Port access was removed and heparin locked tip intact. Telemetry d/c. Waiting for the patient's family to arrive to pick her up.  I will put in for transport when they arrive.

## 2018-09-20 NOTE — CONSULTS
Infectious Disease Consult Note:    Chief Complaint: Chest pain and palpitations    History of Present Illness:  Ms. Salgado is a 38 year-old with a history of Crohn's Disease and s/p multiple abdominal surgeries, including total colectomy with colostomy bag placement. She presented with chest pain, tremors and increased volume of stool in her colostomy bag. She is also undergoing a work-up for carcinoid vs. Pheochromocytoma. Had similar admission to [facility] where she was admitted with the same symptoms. She had a low grade temperature in the ER. ID was consulted to determine if infection is the cause for presenting symptoms.    Review of Symptoms:  Constitutional: Denies fever at home. Has experienced weight loss, chills, or weakness.  Eyes: Denies changes in vision.  ENT: Denies dysphagia, nasal discharge, ear pain or discharge.  Cardiovascular: Denies chest pain, palpitations, orthopnea, or claudication.  Respiratory: Denies shortness of breath, cough, hemoptysis, or wheezing.  GI: Denies nausea/vomitting, hematochezia, melena, abd pain, or changes in appetite.  : Denies dysuria, incontinence, or hematuria.  Musculoskeletal: Reports joint pain or myalgias.  Skin/breast: Denies rashes, lumps, lesions, or discharge.  Neurologic: Denies headache, dizziness, vertigo, or paresthesias.  Psychiatric: Denies changes in mood or hallucinations.  Endocrine: Denies polyuria, polydipsia, heat/cold intolerance.  Hematologic/Lymph: Denies lymphadenopathy, easy bruising or easy bleeding.  Allergic/Immunologic: Denies rash, rhinitis.      Past Medical History:  Past Medical History:   Diagnosis Date    Abnormal Pap smear 2007    Abnormal Pap smear 5/26/2011    Anemia     Anxiety     Arthritis     C. difficile diarrhea     Crohn's disease     Depression 8/5/2017    Encounter for blood transfusion     Genital HSV     History of colposcopy with cervical biopsy 2007 and 7/2011 2007-LYLA I  and 7/2011- LYLA I     Hypertension     Kidney stone     Kidney stone     Recurrent UTI 4/3/2013    S/P ileostomy 7/9/2012    Sterilization 6/23/2012       Past Surgical History:  Past Surgical History:   Procedure Laterality Date    ABDOMINAL SURGERY      APPENDECTOMY      BLADDER SURGERY      partial cystectomy due to fistula    CKC      COLON SURGERY      COLONOSCOPY      EGD (ESOPHAGOGASTRODUODENOSCOPY) N/A 9/6/2013    Performed by [facility]ilio Cameron MD at [facility] END[facility] (4TH FLR)    ESOPHAGOGASTRODUODENOSCOPY (EGD) N/A 10/14/2016    Performed by Janelle Mcpherson MD at [facility] ENDO (2ND FLR)    ESOPHAGOGASTRODUODENOSCOPY (EGD) N/A 10/23/2014    Performed by Nathanael Mitchell MD at [facility] END[facility] (2ND FLR)    EXAM UNDER ANESTHESIA N/A 8/29/2013    Performed by Bob Parsons MD at [facility] OR 2ND FLR    EXAM UNDER ANESTHESIA N/A 1/18/2013    Performed by Bob Parsons MD at [facility] OR 2ND FLR    HYSTERECTOMY-ABDOMINAL-TOTAL (SHELLIE) N/A 4/16/2015    [facility]    ILEOSCOPY N/A 8/8/2017    Performed by Tommie Klein MD at [facility] ENDO (2ND FLR)    ILEOSCOPY N/A 10/14/2016    Performed by Janelle Mcpherson MD at [facility] END[facility] (2ND FLR)    ILEOSCOPY N/A 9/25/2013    Performed by Emilio Cameron MD at [facility] END[facility] (4TH FLR)    ILEOSTOMY      INCISION AND DRAINAGE, ABSCESS N/A 8/29/2013    Performed by Bob Parsons MD at [facility] OR 2ND FLR    QJJUNRXHP-QGOT-I-CATH Left 2/21/2017    Performed by Parish Sanchez Jr., MD at [facility] OR 2ND FLR    OOPHORECTOMY Right 04/16/2015    PORTACATH PLACEMENT  02/21/2017    REPAIR-BLADDER  4/16/2015    [facility] at [facility] OR    SALPINGO-OOPHERECTOMY Right 4/16/2015    [facility] at [facility] OR    SIGMOIDOSCOPY, FLEXIBLE N/A 2/7/2014    [facility] ([facility])    SKIN BIOPSY      SMALL INTESTINE SURGERY      TOTAL ABDOMINAL HYSTERECTOMY  04/16/2015    TOTAL COLECTOMY      TUBAL LIGATION   06/06/2012    UPPER GASTROINTESTINAL ENDOSCOPY         Family History:  Family History   Problem Relation Age of Onset    Colon cancer Father     Cancer Father         colon cancer    Hypertension Mother     Cancer Maternal Grandfather         esopghal     Skin cancer Maternal Grandfather     Endometrial cancer Maternal Aunt     Crohn's disease Brother     Breast cancer Neg Hx     Ovarian cancer Neg Hx          Social History:  Social History     Socioeconomic History    Marital status: Single     Spouse name: Not on file    Number of children: Not on file    Years of education: Not on file    Highest education level: Not on file   Social Needs    Financial resource strain: Not on file    Food insecurity - worry: Not on file    Food insecurity - inability: Not on file    Transportation needs - medical: Not on file    Transportation needs - non-medical: Not on file   Occupational History     Employer: [employer]   Tobacco Use    Smoking status: Never Smoker    Smokeless tobacco: Never Used   Substance and Sexual Activity    Alcohol use: Yes     Alcohol/week: 0.0 oz     Comment: Patient only drinks wine not regular basis.    Drug use: No    Sexual activity: Yes     Partners: Male     Birth control/protection: Pill, Surgical, Condom     Comment: HYST   Other Topics Concern    Are you pregnant or think you may be? Not Asked    Breast-feeding Not Asked   Social History Narrative    Not on file       Allergies:  Review of patient's allergies indicates:   Allergen Reactions    Stelara [ustekinumab] Other (See Comments)     Multiple infections    Zofran [ondansetron hcl (pf)] Other (See Comments)     Per patient causes prolong QT    Vancomycin analogues Hives    Azathioprine sodium      Other reaction(s): pancreatitis  Other reaction(s): pancreatitis    Methotrexate analogues     Morphine Itching and Other (See Comments)     Other reaction(s): Itching       Pertinent  Medications:  Antibiotics:   Antibiotics (From admission, onward)    Start     Stop Route Frequency Ordered    09/18/18 2230  clindamycin 2 % vaginal cream 1 applicator     Question Answer Comment   Brand Name: clindesse    Generic name: clindamycin    Form: Cream    Length of Therapy: 7 days    Reason for Non-Formulary: No equivalent formulary medication available    How soon needed? (if approved, may take up to 5 days to procure): 0-24 hrs    Requestor's Contact Information: [phone]    Is the patient competent? Yes        -- Vagl Nightly 09/18/18 2222          Physical Exam:  VS (24h):   Vitals:    09/19/18 1645   BP: 113/77   Pulse: 68   Resp: 16   Temp: 98.9 °F (37.2 °C)     Temp:  [97.9 °F (36.6 °C)-100.3 °F (37.9 °C)]       General: Having tremor. Not chills. Patient states tremors are a component of the symptoms she has experienced recently.  Afebrile, alert, comfortable, no acute distress.   HEENT: [place]. EOMI, no scleral icterus. No sinus tenderness.   Pulmonary: Non labored,clear to auscultation A/P/L. No wheezing, crackles, or rhonchi.  Cardiac: normal S1 & S2 w/o rubs/murmurs/gallops. No JVD.   Abdominal: Well-healed midline scar. Ostomy bag in place. Non-tender, non-distended.Bowel sounds present x 4. No appreciable hepatosplenomegaly.  Extremities: Moves all extremities x 4. No peripheral edema. 2+ pulses.  Skin: No jaundice, rashes, or visible lesions.  Neuro:  Alert and oriented x 4.  sensation intact x 4.     Lines:   Port-a-cath in left chest - Has been in place since 2006      Labs:  CBC:   Lab Results   Component Value Date    WBC 5.84 09/19/2018    WBC 7.32 09/18/2018    WBC 5.83 09/06/2018    WBC 4.99 09/05/2018    WBC 5.24 09/04/2018    HGB 10.4 (L) 09/19/2018    HCT 31.7 (L) 09/19/2018    MCV 88 09/19/2018     09/19/2018       BMP:   Recent Labs   Lab  09/19/18   0623   GLU  171*   NA  136   K  3.7   CL  108   CO2  20*   BUN  4*   CREATININE  0.8   CALCIUM  8.9   MG  1.7        LFT:   Lab Results   Component Value Date    ALT 27 09/19/2018    AST 31 09/19/2018    ALKPHOS 81 09/19/2018    BILITOT 0.9 09/19/2018         Microbiology x 7d:   Microbiology Results (last 7 days)     Procedure Component Value Units Date/Time    Blood culture x two cultures. Draw prior to antibiotics. [[id number]] Collected:  09/18/18 1608    Order Status:  Completed Specimen:  Blood from Peripheral, Hand, Left Updated:  09/19/18 2212     Blood Culture, Routine No Growth to date     Blood Culture, Routine No Growth to date    Narrative:       Aerobic and anaerobic    Blood culture x two cultures. Draw prior to antibiotics. [[id number]] Collected:  09/18/18 1609    Order Status:  Completed Specimen:  Blood from Peripheral, Upper Arm, Left Updated:  09/19/18 2212     Blood Culture, Routine No Growth to date     Blood Culture, Routine No Growth to date    Narrative:       Aerobic and anaerobic    Urine culture [[id number]] Collected:  09/18/18 2131    Order Status:  No result Specimen:  Urine Updated:  09/18/18 2235    Urine culture [[id number]]     Order Status:  Completed Specimen:  Urine, Clean Catch             Imaging:  CXR without infiltrate    Assessment/Plan:  1. Chest pain, palpitations, tremors, etc. - No evidence of infection associated with presenting symptoms.  2. No need for antibiotic therapy  3. Continue work-up for carcinoid, pheochromocytoma, etc.  4. Will sign off. Please call for any other questions.    Jesi Linares MD  [facility] Infectious Diseases Staff

## 2018-09-21 ENCOUNTER — TELEPHONE (OUTPATIENT)
Dept: UROLOGY | Facility: CLINIC | Age: 38
End: 2018-09-21

## 2018-09-21 ENCOUNTER — PATIENT MESSAGE (OUTPATIENT)
Dept: UROLOGY | Facility: CLINIC | Age: 38
End: 2018-09-21

## 2018-09-21 ENCOUNTER — INFUSION (OUTPATIENT)
Dept: INFUSION THERAPY | Facility: OTHER | Age: 38
End: 2018-09-21
Attending: INTERNAL MEDICINE
Payer: MEDICAID

## 2018-09-21 VITALS
TEMPERATURE: 100 F | WEIGHT: 98.31 LBS | HEIGHT: 61 IN | RESPIRATION RATE: 18 BRPM | OXYGEN SATURATION: 99 % | BODY MASS INDEX: 18.56 KG/M2 | DIASTOLIC BLOOD PRESSURE: 76 MMHG | SYSTOLIC BLOOD PRESSURE: 139 MMHG | HEART RATE: 104 BPM

## 2018-09-21 DIAGNOSIS — K50.00 CROHN'S DISEASE OF SMALL INTESTINE: Primary | ICD-10-CM

## 2018-09-21 LAB
5-HIAA, PLASMA (NEUROEND): 5 NG/ML
BACTERIA UR CULT: NORMAL

## 2018-09-21 PROCEDURE — 25000003 PHARM REV CODE 250: Performed by: INTERNAL MEDICINE

## 2018-09-21 PROCEDURE — 96361 HYDRATE IV INFUSION ADD-ON: CPT

## 2018-09-21 PROCEDURE — 63600175 PHARM REV CODE 636 W HCPCS: Performed by: INTERNAL MEDICINE

## 2018-09-21 PROCEDURE — 96360 HYDRATION IV INFUSION INIT: CPT

## 2018-09-21 RX ORDER — HEPARIN 100 UNIT/ML
500 SYRINGE INTRAVENOUS
Status: COMPLETED | OUTPATIENT
Start: 2018-09-21 | End: 2018-09-21

## 2018-09-21 RX ADMIN — HEPARIN 500 UNITS: 100 SYRINGE at 12:09

## 2018-09-21 RX ADMIN — SODIUM CHLORIDE 2000 ML: 0.9 INJECTION, SOLUTION INTRAVENOUS at 09:09

## 2018-09-21 NOTE — TELEPHONE ENCOUNTER
I'm not sure why she is taking this medication. She was just discharged from the hospital yesterday. Infectious Disease doctors saw her there and recommended NO antibiotics at this time.     It does appear that there is bacteria growing from a urine culture, however if she doesn't have symptoms of a UTI then that should not be treated.

## 2018-09-21 NOTE — PLAN OF CARE
Problem: Patient Care Overview  Goal: Plan of Care Review  Outcome: Ongoing (interventions implemented as appropriate)  Pt tolerated 2L IV fluids without issue, VSS, AVS reviewed.

## 2018-09-21 NOTE — TELEPHONE ENCOUNTER
S/w patient and reviewed chart.    She has no UTI symptoms at this time or in the past few days.    Recommend she stop her antibiotics as treatment for asymptomatic bacteruria is not recommended.    Will call to FU next week if symptoms do develop and will treat then based on final culture results.

## 2018-09-21 NOTE — TELEPHONE ENCOUNTER
Spoke to the patient and was informed that she has become nauseated temp 99.8 this am loose stool ,could the patient be placed on another medication

## 2018-09-23 LAB
BACTERIA BLD CULT: NORMAL
BACTERIA BLD CULT: NORMAL

## 2018-09-26 ENCOUNTER — PATIENT MESSAGE (OUTPATIENT)
Dept: GASTROENTEROLOGY | Facility: CLINIC | Age: 38
End: 2018-09-26

## 2018-09-26 ENCOUNTER — PATIENT MESSAGE (OUTPATIENT)
Dept: UROLOGY | Facility: CLINIC | Age: 38
End: 2018-09-26

## 2018-09-26 ENCOUNTER — PATIENT MESSAGE (OUTPATIENT)
Dept: INTERNAL MEDICINE | Facility: CLINIC | Age: 38
End: 2018-09-26

## 2018-09-26 RX ORDER — NITROFURANTOIN 25; 75 MG/1; MG/1
100 CAPSULE ORAL 2 TIMES DAILY
Qty: 14 CAPSULE | Refills: 0 | Status: SHIPPED | OUTPATIENT
Start: 2018-09-26 | End: 2018-10-03

## 2018-09-27 ENCOUNTER — INFUSION (OUTPATIENT)
Dept: INFUSION THERAPY | Facility: OTHER | Age: 38
End: 2018-09-27
Attending: INTERNAL MEDICINE
Payer: MEDICAID

## 2018-09-27 VITALS
SYSTOLIC BLOOD PRESSURE: 128 MMHG | DIASTOLIC BLOOD PRESSURE: 74 MMHG | RESPIRATION RATE: 17 BRPM | HEART RATE: 82 BPM | OXYGEN SATURATION: 100 % | TEMPERATURE: 98 F

## 2018-09-27 DIAGNOSIS — K50.00 CROHN'S DISEASE OF SMALL INTESTINE: Primary | ICD-10-CM

## 2018-09-27 PROCEDURE — 25000003 PHARM REV CODE 250: Performed by: INTERNAL MEDICINE

## 2018-09-27 PROCEDURE — 63600175 PHARM REV CODE 636 W HCPCS: Performed by: INTERNAL MEDICINE

## 2018-09-27 PROCEDURE — 96360 HYDRATION IV INFUSION INIT: CPT

## 2018-09-27 PROCEDURE — 96361 HYDRATE IV INFUSION ADD-ON: CPT

## 2018-09-27 RX ORDER — HEPARIN 100 UNIT/ML
500 SYRINGE INTRAVENOUS
Status: COMPLETED | OUTPATIENT
Start: 2018-09-27 | End: 2018-09-27

## 2018-09-27 RX ADMIN — HEPARIN 500 UNITS: 100 SYRINGE at 03:09

## 2018-09-27 RX ADMIN — SODIUM CHLORIDE 2000 ML: 0.9 INJECTION, SOLUTION INTRAVENOUS at 01:09

## 2018-09-27 NOTE — PHYSICIAN QUERY
PT Name: Ivy Salgado  MR #: [id number]     Physician Query Form - Documentation Clarification      CDS/: Pamella Ross RN             Contact information:[phone]    This form is a permanent document in the medical record.     Query Date: September 26, 2018    By submitting this query, we are merely seeking further clarification of documentation. Please utilize your independent clinical judgment when addressing the question(s) below.    The Medical record reflects the following:    Supporting Clinical Findings Location in Medical Record   37 yo female w/ Crohn's disease w/ dumping and ileostomy           H&P 9/18                                                                                        Doctor, for accurate coding purposed, please further clarify  'dumping' as    Provider Use Only          [ x ]  Dumping Syndrome  [  ]  Malabsorption due to GI loss  [  ]  Or both  [  ]  Other (please specify)____________________  [  ]  Clinically undetermined

## 2018-09-27 NOTE — PLAN OF CARE
Problem: Patient Care Overview  Goal: Plan of Care Review  Outcome: Ongoing (interventions implemented as appropriate)  Pt tolerated 2L IVF without issue. VSS. AVS given. Pt returning 10.4 for next infusion.

## 2018-09-28 LAB — CALPROTECTIN STL-MCNT: <15.6 MCG/G

## 2018-10-02 ENCOUNTER — APPOINTMENT (RX ONLY)
Dept: URBAN - METROPOLITAN AREA CLINIC 98 | Facility: CLINIC | Age: 38
Setting detail: DERMATOLOGY
End: 2018-10-02

## 2018-10-02 DIAGNOSIS — L72.0 EPIDERMAL CYST: ICD-10-CM

## 2018-10-02 DIAGNOSIS — I78.8 OTHER DISEASES OF CAPILLARIES: ICD-10-CM

## 2018-10-02 DIAGNOSIS — D22 MELANOCYTIC NEVI: ICD-10-CM

## 2018-10-02 PROBLEM — D22.72 MELANOCYTIC NEVI OF LEFT LOWER LIMB, INCLUDING HIP: Status: ACTIVE | Noted: 2018-10-02

## 2018-10-02 PROBLEM — I10 ESSENTIAL (PRIMARY) HYPERTENSION: Status: ACTIVE | Noted: 2018-10-02

## 2018-10-02 PROBLEM — D48.5 NEOPLASM OF UNCERTAIN BEHAVIOR OF SKIN: Status: ACTIVE | Noted: 2018-10-02

## 2018-10-02 PROBLEM — F41.9 ANXIETY DISORDER, UNSPECIFIED: Status: ACTIVE | Noted: 2018-10-02

## 2018-10-02 PROCEDURE — ? BIOPSY BY SHAVE METHOD

## 2018-10-02 PROCEDURE — 99213 OFFICE O/P EST LOW 20 MIN: CPT | Mod: 25

## 2018-10-02 PROCEDURE — 11100: CPT

## 2018-10-02 PROCEDURE — ? OBSERVATION

## 2018-10-02 PROCEDURE — ? COUNSELING

## 2018-10-02 ASSESSMENT — LOCATION SIMPLE DESCRIPTION DERM
LOCATION SIMPLE: LEFT PRETIBIAL REGION
LOCATION SIMPLE: LEFT LIP
LOCATION SIMPLE: LEFT EYELID

## 2018-10-02 ASSESSMENT — LOCATION ZONE DERM
LOCATION ZONE: LEG
LOCATION ZONE: LIP
LOCATION ZONE: EYELID

## 2018-10-02 ASSESSMENT — LOCATION DETAILED DESCRIPTION DERM
LOCATION DETAILED: LEFT MEDIAL CANTHUS
LOCATION DETAILED: LEFT UPPER CUTANEOUS LIP
LOCATION DETAILED: LEFT DISTAL PRETIBIAL REGION
LOCATION DETAILED: LEFT MEDIAL DISTAL PRETIBIAL REGION

## 2018-10-02 NOTE — HPI: SKIN LESION
Is This A New Presentation, Or A Follow-Up?: Skin Lesion
What Type Of Note Output Would You Prefer (Optional)?: Standard Output
How Severe Is Your Skin Lesion?: moderate
Has Your Skin Lesion Been Treated?: not been treated
Which Family Member (Optional)?: Grandfather
Is This A New Presentation, Or A Follow-Up?: Skin Lesions

## 2018-10-02 NOTE — PROCEDURE: BIOPSY BY SHAVE METHOD
Bill [id number] For Specimen Handling/Conveyance To Laboratory?: no
Anesthesia Volume In Cc (Will Not Render If 0): 0.9
Size Of Lesion In Cm: 0
Silver Nitrate Text: The wound bed was treated with silver nitrate after the biopsy was performed.
Render Post-Care Instructions In Note?: yes
Hemostasis: Drysol
Depth Of Biopsy: dermis
Type Of Destruction Used: Curettage
Electrodesiccation Text: The wound bed was treated with electrodesiccation after the biopsy was performed.
Post-Care Instructions: 1) Gently wash the biopsy site with regular soap and water daily. If a scab develops, you can dilute hydrogen peroxide 1:1 with tap water and apply it to dissolve the crust.\\n2) Keep a small amount of white petrolatum (Aquaphor) and a non­stick bandage on the dressing at all times. Antibiotic ointments (Neosporin, etc) have not shown any superiority to simple petrolatum, cost more, and run the risk of a contact allergy. Keeping the wound covered has been shown to be superior to \"airing it out.\" If the bandage is irritating you, let us know and we can find a less­irritating alternative dressing together.\\n3) Notify the office if significant, persistent bleeding occurs from the biopsy site.\\n4) Do not expose the wound to fresh, salty, or brackish water until it has completely healed (after about 2 weeks). Tap or chlorinated water should be fine.\\n5) A small rim of redness and a small amount of yellow debris on the wound bed are normal. If you encounter increasing warmth, redness, pain, or liquid pus after the first day, these might indicate a localized infection and you should notify our office via phone or email.\\n6) If regular bandaids are uncomfortable or irritating, then oval hydrocolloid dressings (often sold as \"blister pads\") can be cut to fit and placed on the wound after the first 3­4 days, and changed out every 3­4 days. It is ok to wear these dressings in the shower or pool.\\n7) If stitches have been placed, you will need to return to the clinic in 1 or 2 weeks to have them professionally removed. Cutting the knots yourself may leave irritating portions of suture material under the skin.\\n8) Do not assume that \"no news is good news.\" If you have not received a call from our office within 7 days, please let us know so that we can investigate what might be holding up the biopsy report.\\n9) Wound care should continue until the biopsy site is pink, smooth, and dry with new skin, usually by 2 weeks.
Anesthesia Type: 1% lidocaine without epinephrine
Lab: [id number]
Electrodesiccation And Curettage Text: The wound bed was treated with electrodesiccation and curettage after the biopsy was performed.
Cryotherapy Text: The wound bed was treated with cryotherapy after the biopsy was performed.
Notification Instructions: Patient will be notified of biopsy results. However, patient instructed to call the office if not contacted within 2 weeks.
Biopsy Type: H and E
Biopsy Method: Dermablade
Curettage Text: The wound bed was treated with curettage after the biopsy was performed.
Billing Type: Third-Party Bill
Consent: Verbal consent was obtained and risks were reviewed including but not limited to scarring, infection, bleeding, scabbing, incomplete removal, nerve damage and allergy to anesthesia.
Lab Facility: [id number]
Wound Care: Petrolatum
Dressing: Band-Aid
Detail Level: Simple

## 2018-10-03 DIAGNOSIS — F41.9 ANXIETY: ICD-10-CM

## 2018-10-03 DIAGNOSIS — R53.83 OTHER FATIGUE: Primary | ICD-10-CM

## 2018-10-04 ENCOUNTER — TELEPHONE (OUTPATIENT)
Dept: GASTROENTEROLOGY | Facility: CLINIC | Age: 38
End: 2018-10-04

## 2018-10-04 ENCOUNTER — INFUSION (OUTPATIENT)
Dept: INFUSION THERAPY | Facility: OTHER | Age: 38
End: 2018-10-04
Attending: INTERNAL MEDICINE
Payer: MEDICAID

## 2018-10-04 ENCOUNTER — PATIENT MESSAGE (OUTPATIENT)
Dept: INTERNAL MEDICINE | Facility: CLINIC | Age: 38
End: 2018-10-04

## 2018-10-04 VITALS
TEMPERATURE: 98 F | SYSTOLIC BLOOD PRESSURE: 123 MMHG | HEART RATE: 92 BPM | OXYGEN SATURATION: 100 % | RESPIRATION RATE: 18 BRPM | DIASTOLIC BLOOD PRESSURE: 90 MMHG

## 2018-10-04 DIAGNOSIS — K50.019 CROHN'S DISEASE OF SMALL INTESTINE WITH COMPLICATION: Primary | ICD-10-CM

## 2018-10-04 LAB
ALBUMIN SERPL BCP-MCNC: 3.9 G/DL
ALP SERPL-CCNC: 88 U/L
ALT SERPL W/O P-5'-P-CCNC: 12 U/L
ANION GAP SERPL CALC-SCNC: 7 MMOL/L
AST SERPL-CCNC: 18 U/L
BILIRUB SERPL-MCNC: 0.6 MG/DL
BUN SERPL-MCNC: 9 MG/DL
CALCIUM SERPL-MCNC: 9.7 MG/DL
CHLORIDE SERPL-SCNC: 103 MMOL/L
CO2 SERPL-SCNC: 27 MMOL/L
CREAT SERPL-MCNC: 0.8 MG/DL
EST. GFR  (AFRICAN AMERICAN): >60 ML/MIN/1.73 M^2
EST. GFR  (NON AFRICAN AMERICAN): >60 ML/MIN/1.73 M^2
GLUCOSE SERPL-MCNC: 105 MG/DL
POTASSIUM SERPL-SCNC: 3.8 MMOL/L
PROT SERPL-MCNC: 7.9 G/DL
SODIUM SERPL-SCNC: 137 MMOL/L

## 2018-10-04 PROCEDURE — 80053 COMPREHEN METABOLIC PANEL: CPT

## 2018-10-04 PROCEDURE — 36591 DRAW BLOOD OFF VENOUS DEVICE: CPT

## 2018-10-04 PROCEDURE — 25000003 PHARM REV CODE 250: Performed by: INTERNAL MEDICINE

## 2018-10-04 PROCEDURE — 96360 HYDRATION IV INFUSION INIT: CPT

## 2018-10-04 PROCEDURE — 63600175 PHARM REV CODE 636 W HCPCS: Performed by: INTERNAL MEDICINE

## 2018-10-04 PROCEDURE — 96361 HYDRATE IV INFUSION ADD-ON: CPT

## 2018-10-04 RX ORDER — HEPARIN 100 UNIT/ML
500 SYRINGE INTRAVENOUS
Status: CANCELLED | OUTPATIENT
Start: 2018-10-05

## 2018-10-04 RX ORDER — HEPARIN 100 UNIT/ML
500 SYRINGE INTRAVENOUS
Status: COMPLETED | OUTPATIENT
Start: 2018-10-04 | End: 2018-10-04

## 2018-10-04 RX ORDER — ZOLPIDEM TARTRATE 10 MG/1
TABLET ORAL
Qty: 30 TABLET | Refills: 0 | Status: SHIPPED | OUTPATIENT
Start: 2018-10-04 | End: 2018-11-02 | Stop reason: SDUPTHER

## 2018-10-04 RX ADMIN — SODIUM CHLORIDE: 0.9 INJECTION, SOLUTION INTRAVENOUS at 09:10

## 2018-10-04 RX ADMIN — HEPARIN 500 UNITS: 100 SYRINGE at 10:10

## 2018-10-04 RX ADMIN — SODIUM CHLORIDE: 0.9 INJECTION, SOLUTION INTRAVENOUS at 08:10

## 2018-10-04 NOTE — PLAN OF CARE
Problem: Patient Care Overview  Goal: Plan of Care Review  Outcome: Ongoing (interventions implemented as appropriate)  Pt tolerated lab draw and 2L IVF without issue. VSS. AVS given. Pt to return 10.10.

## 2018-10-04 NOTE — TELEPHONE ENCOUNTER
----- Message from Parish Ross MD sent at 10/4/2018  9:40 AM CDT -----  Contact: [facility] - [phone]  thanks  ----- Message -----  From: Belinda Sullivan MA  Sent: 10/4/2018   9:15 AM  To: Parish Ross MD    Spoke with Debbie.  She will put in the orders and pend them to you to sign.    ----- Message -----  From: Parish Ross MD  Sent: 10/4/2018   9:09 AM  To: Belinda Sullivan MA    Sure, can order lab  Would that be clinic collect?  ----- Message -----  From: Belinda Sullivan MA  Sent: 10/4/2018   9:01 AM  To: Parish Ross MD        ----- Message -----  From: Quinaa Carmen  Sent: 10/4/2018   8:51 AM  To: Cody Ross - re ordering liquidy stools - do you want to order labs for potassium - please call [facility] - [phone]

## 2018-10-09 RX ORDER — ALPRAZOLAM 1 MG/1
TABLET ORAL
Qty: 60 TABLET | Refills: 0 | Status: SHIPPED | OUTPATIENT
Start: 2018-10-09 | End: 2018-11-12 | Stop reason: SDUPTHER

## 2018-10-11 ENCOUNTER — LAB VISIT (OUTPATIENT)
Dept: LAB | Facility: HOSPITAL | Age: 38
End: 2018-10-11
Attending: OBSTETRICS & GYNECOLOGY
Payer: MEDICAID

## 2018-10-11 ENCOUNTER — PATIENT MESSAGE (OUTPATIENT)
Dept: OBSTETRICS AND GYNECOLOGY | Facility: CLINIC | Age: 38
End: 2018-10-11

## 2018-10-11 ENCOUNTER — OFFICE VISIT (OUTPATIENT)
Dept: OBSTETRICS AND GYNECOLOGY | Facility: CLINIC | Age: 38
End: 2018-10-11
Payer: MEDICAID

## 2018-10-11 ENCOUNTER — HOSPITAL ENCOUNTER (OUTPATIENT)
Dept: RADIOLOGY | Facility: HOSPITAL | Age: 38
Discharge: HOME | End: 2018-10-11
Attending: OBSTETRICS & GYNECOLOGY
Payer: MEDICAID

## 2018-10-11 VITALS
HEIGHT: 61 IN | WEIGHT: 93.38 LBS | BODY MASS INDEX: 17.63 KG/M2 | DIASTOLIC BLOOD PRESSURE: 64 MMHG | SYSTOLIC BLOOD PRESSURE: 122 MMHG

## 2018-10-11 DIAGNOSIS — Z01.419 WELL WOMAN EXAM WITH ROUTINE GYNECOLOGICAL EXAM: Primary | ICD-10-CM

## 2018-10-11 DIAGNOSIS — R87.622 LGSIL PAP SMEAR OF VAGINA: ICD-10-CM

## 2018-10-11 DIAGNOSIS — Z12.4 ROUTINE CERVICAL SMEAR: ICD-10-CM

## 2018-10-11 DIAGNOSIS — Z12.31 ENCOUNTER FOR SCREENING MAMMOGRAM FOR MALIGNANT NEOPLASM OF BREAST: ICD-10-CM

## 2018-10-11 DIAGNOSIS — E04.2 MULTIPLE THYROID NODULES: ICD-10-CM

## 2018-10-11 LAB
T3FREE SERPL-MCNC: 3 PG/ML
T4 FREE SERPL-MCNC: 1.14 NG/DL
TSH SERPL DL<=0.005 MIU/L-ACNC: 0.7 UIU/ML

## 2018-10-11 PROCEDURE — 77063 BREAST TOMOSYNTHESIS BI: CPT | Mod: TC

## 2018-10-11 PROCEDURE — 36415 COLL VENOUS BLD VENIPUNCTURE: CPT

## 2018-10-11 PROCEDURE — 99999 PR PBB SHADOW E&M-EST. PATIENT-LVL III: CPT | Mod: PBBFAC,,, | Performed by: OBSTETRICS & GYNECOLOGY

## 2018-10-11 PROCEDURE — 87624 HPV HI-RISK TYP POOLED RSLT: CPT

## 2018-10-11 PROCEDURE — 77067 SCR MAMMO BI INCL CAD: CPT | Mod: 26,,, | Performed by: RADIOLOGY

## 2018-10-11 PROCEDURE — 88175 CYTOPATH C/V AUTO FLUID REDO: CPT

## 2018-10-11 PROCEDURE — 84443 ASSAY THYROID STIM HORMONE: CPT

## 2018-10-11 PROCEDURE — 99213 OFFICE O/P EST LOW 20 MIN: CPT | Mod: PBBFAC,PO | Performed by: OBSTETRICS & GYNECOLOGY

## 2018-10-11 PROCEDURE — 99395 PREV VISIT EST AGE 18-39: CPT | Mod: S$PBB,,, | Performed by: OBSTETRICS & GYNECOLOGY

## 2018-10-11 PROCEDURE — 84481 FREE ASSAY (FT-3): CPT

## 2018-10-11 PROCEDURE — 77067 SCR MAMMO BI INCL CAD: CPT | Mod: TC

## 2018-10-11 PROCEDURE — 86376 MICROSOMAL ANTIBODY EACH: CPT

## 2018-10-11 PROCEDURE — 84439 ASSAY OF FREE THYROXINE: CPT

## 2018-10-11 PROCEDURE — 77063 BREAST TOMOSYNTHESIS BI: CPT | Mod: 26,,, | Performed by: RADIOLOGY

## 2018-10-11 NOTE — PROGRESS NOTES
OBSTETRIC HISTORY:   OB History      Para Term  AB Living    2 1 1 0 1 1    SAB TAB Ectopic Multiple Live Births    1 0 0 0 1         COMPREHENSIVE GYN HISTORY:  PAP History: Reports abnormal Paps. Date: 10/2/17 Result: LGSIL of vagina (hx of CKC)  Pap  Dates: , , ,  has had CKC  Infection History: Reports STDs: Herpes. Denies PID.  Benign History: Denies uterine fibroids. Reports ovarian cysts. Denies endometriosis.   Cancer History: Denies cervical cancer. Denies uterine cancer or hyperplasia. Denies ovarian cancer. Denies vulvar cancer or pre-cancer. Denies vaginal cancer or pre-cancer. Denies breast cancer. Denies colon cancer.  Sexual Activity History:  reports that she currently engages in sexual activity. She reports using the following methods of birth control/protection: Pill and Surgical.   Menstrual History: Every 28 days, flows for 3 days. Moderate flow.  Dysmenorrhea History: Reports severe dysmenorrhea.  Contraception: Hysterectomy      HPI:   38 y.o.  Patient's last menstrual period was 2015.    Patient is  here for her annual gynecologic exam and abnormal Pap follow up.  She has no complaints but mentions has been having elevated BP and sinus tachycardia that is being worked up.. She denies bladder, bowel and breast complaints.    ROS:  GENERAL: Denies weight gain or weight loss. Feeling well overall.   SKIN: Denies rash or lesions.   HEAD: Denies headache.   NODES: Denies enlarged lymph nodes.   CHEST: Denies shortness of breath.   ABDOMEN: No abdominal pain, constipation, diarrhea, nausea, vomiting or rectal bleeding.   URINARY: No frequency, dysuria, hematuria, or burning on urination.  REPRODUCTIVE: See HPI.   BREASTS: The patient denies pain, lumps, or nipple discharge.   HEMATOLOGIC: No easy bruisability.   MUSCULOSKELETAL: Denies joint pain or back pain.   NEUROLOGIC: Denies weakness.   PSYCHIATRIC: Denies depression, anxiety or mood swings.      PE:   BP  "122/64   Ht 5' 1" (1.549 m)   Wt 42.4 kg (93 lb 6.2 oz)   LMP 03/30/2015   BMI 17.65 kg/m²   APPEARANCE: Well nourished, well developed, in no acute distress.  NECK: Neck symmetric with thyroid nodules.  NODES: No inguinal or cervical lymph node enlargement.  CHEST: Lungs clear to auscultation.  HEART: Regular rate and rhythm, no murmurs, rubs or gallops.  ABDOMEN: Soft. No tenderness or masses. No hernias. Colostomy bag present  BREASTS: Symmetrical, no skin changes or visible lesions. No palpable masses, nipple discharge or adenopathy bilaterally.  VULVA: No lesions. Normal female genitalia.  URETHRAL MEATUS: Normal size and location, no lesions, no prolapse.  URETHRA: No masses, tenderness, prolapse or scarring.  VAGINA: Moist and well rugated, no discharge, no significant cystocele or rectocele.  CERVIX AND UTERUS SURGICALLY ABSENT.   ADNEXA: Some tenderness right adnexa and fullness which is stable.    PROCEDURES:  Pap smear -- INDICATED (abnormal Pap smears)    Assessment:  Normal Gynecologic Exam  Abnormal Pap smears  Baseline MMG  Thyroid nodules-- TSH, Free T4, T3 and thyroid Us    Plan:  Mammogram and Colonoscopy if indicated by current recommendations.  Return to clinic in one year or for any problems or complaints.    Counseling:  Patient was counseled today on A.C.S. Pap guidelines and recommendations for yearly pelvic exams and monthly self breast exams; to see her PCP for other health maintenance.Regular exercise and healthy diet.                   "

## 2018-10-12 ENCOUNTER — HOSPITAL ENCOUNTER (OUTPATIENT)
Dept: RADIOLOGY | Facility: OTHER | Age: 38
Discharge: HOME | End: 2018-10-12
Attending: OBSTETRICS & GYNECOLOGY
Payer: MEDICAID

## 2018-10-12 ENCOUNTER — PATIENT MESSAGE (OUTPATIENT)
Dept: INTERNAL MEDICINE | Facility: CLINIC | Age: 38
End: 2018-10-12

## 2018-10-12 ENCOUNTER — PATIENT MESSAGE (OUTPATIENT)
Dept: OBSTETRICS AND GYNECOLOGY | Facility: CLINIC | Age: 38
End: 2018-10-12

## 2018-10-12 DIAGNOSIS — E04.2 MULTIPLE THYROID NODULES: ICD-10-CM

## 2018-10-12 LAB — THYROPEROXIDASE IGG SERPL-ACNC: <6 IU/ML

## 2018-10-12 PROCEDURE — 93272 ECG/REVIEW INTERPRET ONLY: CPT | Mod: ,,, | Performed by: INTERNAL MEDICINE

## 2018-10-12 PROCEDURE — 76536 US EXAM OF HEAD AND NECK: CPT | Mod: TC

## 2018-10-12 PROCEDURE — 76536 US EXAM OF HEAD AND NECK: CPT | Mod: 26,,, | Performed by: INTERNAL MEDICINE

## 2018-10-14 ENCOUNTER — PATIENT MESSAGE (OUTPATIENT)
Dept: INTERNAL MEDICINE | Facility: CLINIC | Age: 38
End: 2018-10-14

## 2018-10-15 ENCOUNTER — TELEPHONE (OUTPATIENT)
Dept: GASTROENTEROLOGY | Facility: CLINIC | Age: 38
End: 2018-10-15

## 2018-10-15 ENCOUNTER — TELEPHONE (OUTPATIENT)
Dept: INTERNAL MEDICINE | Facility: CLINIC | Age: 38
End: 2018-10-15

## 2018-10-15 NOTE — TELEPHONE ENCOUNTER
----- Message from Mariangel Tyler sent at 10/15/2018  9:32 AM CDT -----  Contact: Pt  Name of Who is Calling: PHANI RODRIGUEZ [[id number]]      What is the request in detail: Patient is requesting a call from staff in regards to being seen this week. Please contact to further discuss and advise      Can the clinic reply by [facility]: Yes       What [facility] to [facility] Back [facility] [phone]

## 2018-10-15 NOTE — TELEPHONE ENCOUNTER
Spoke with patient.  Stated she did not want this office.  Had asked for the [facility] infusion center.

## 2018-10-16 ENCOUNTER — PATIENT MESSAGE (OUTPATIENT)
Dept: GASTROENTEROLOGY | Facility: CLINIC | Age: 38
End: 2018-10-16

## 2018-10-17 ENCOUNTER — PATIENT MESSAGE (OUTPATIENT)
Dept: GASTROENTEROLOGY | Facility: CLINIC | Age: 38
End: 2018-10-17

## 2018-10-17 ENCOUNTER — OFFICE VISIT (OUTPATIENT)
Dept: URGENT CARE | Facility: CLINIC | Age: 38
End: 2018-10-17
Payer: MEDICAID

## 2018-10-17 VITALS
OXYGEN SATURATION: 100 % | TEMPERATURE: 100 F | HEART RATE: 107 BPM | RESPIRATION RATE: 18 BRPM | DIASTOLIC BLOOD PRESSURE: 90 MMHG | SYSTOLIC BLOOD PRESSURE: 126 MMHG | WEIGHT: 95 LBS | BODY MASS INDEX: 17.94 KG/M2 | HEIGHT: 61 IN

## 2018-10-17 DIAGNOSIS — K50.819 CROHN'S DISEASE OF SMALL AND LARGE INTESTINES WITH COMPLICATION: ICD-10-CM

## 2018-10-17 DIAGNOSIS — J06.9 UPPER RESPIRATORY TRACT INFECTION, UNSPECIFIED TYPE: Primary | ICD-10-CM

## 2018-10-17 LAB
CTP QC/QA: YES
CTP QC/QA: YES
FLUAV AG NPH QL: NEGATIVE
FLUBV AG NPH QL: NEGATIVE
S PYO RRNA THROAT QL PROBE: NEGATIVE

## 2018-10-17 PROCEDURE — 87804 INFLUENZA ASSAY W/OPTIC: CPT | Mod: QW,S$GLB,, | Performed by: PHYSICIAN ASSISTANT

## 2018-10-17 PROCEDURE — 87880 STREP A ASSAY W/OPTIC: CPT | Mod: QW,S$GLB,, | Performed by: PHYSICIAN ASSISTANT

## 2018-10-17 PROCEDURE — 99214 OFFICE O/P EST MOD 30 MIN: CPT | Mod: S$GLB,,, | Performed by: PHYSICIAN ASSISTANT

## 2018-10-17 RX ORDER — DIPHENOXYLATE HYDROCHLORIDE AND ATROPINE SULFATE 2.5; .025 MG/1; MG/1
1 TABLET ORAL 4 TIMES DAILY PRN
Qty: 100 TABLET | Refills: 0 | Status: SHIPPED | OUTPATIENT
Start: 2018-10-17 | End: 2018-11-19 | Stop reason: SDUPTHER

## 2018-10-17 RX ORDER — OXYCODONE AND ACETAMINOPHEN 10; 325 MG/1; MG/1
1 TABLET ORAL EVERY 6 HOURS PRN
Qty: 40 TABLET | Refills: 0 | Status: SHIPPED | OUTPATIENT
Start: 2018-10-17 | End: 2018-11-19 | Stop reason: SDUPTHER

## 2018-10-17 NOTE — PROGRESS NOTES
"Subjective:       Patient ID: Ivy Salgado is a 38 y.o. female.    Vitals:  height is 5' 1" (1.549 m) and weight is 43.1 kg (95 lb). Her temperature is 99.6 °F (37.6 °C). Her blood pressure is 126/90 (abnormal) and her pulse is 107. Her respiration is 18 and oxygen saturation is 100%.     Chief Complaint: URI    URI    This is a new problem. The current episode started in the past 7 days. The problem has been gradually worsening. There has been no fever. Associated symptoms include congestion, coughing, headaches, rhinorrhea, sinus pain and a sore throat. Pertinent negatives include no abdominal pain, chest pain, ear pain, nausea or wheezing. She has tried nothing for the symptoms. The treatment provided no relief.     Review of Systems   Constitution: Positive for chills and malaise/fatigue. Negative for fever.   HENT: Positive for congestion, rhinorrhea, sinus pain and sore throat. Negative for ear pain and hoarse voice.    Eyes: Negative for discharge and redness.   Cardiovascular: Negative for chest pain, dyspnea on exertion and leg swelling.   Respiratory: Positive for cough. Negative for shortness of breath, sputum production and wheezing.    Musculoskeletal: Negative for myalgias.   Gastrointestinal: Negative for abdominal pain and nausea.   Neurological: Positive for headaches.       Objective:      Physical Exam   Constitutional: She is oriented to person, place, and time. Vital signs are normal. She appears well-developed and well-nourished. She does not appear ill. No distress.   HENT:   Head: Normocephalic and atraumatic.   Right Ear: Hearing, tympanic membrane, external ear and ear canal normal.   Left Ear: Hearing, tympanic membrane, external ear and ear canal normal.   Nose: Mucosal edema present. Right sinus exhibits no maxillary sinus tenderness and no frontal sinus tenderness. Left sinus exhibits no maxillary sinus tenderness and no frontal sinus tenderness.   Mouth/Throat: Uvula is midline and " oropharynx is clear and moist. No oropharyngeal exudate, posterior oropharyngeal edema or posterior oropharyngeal erythema.   Eyes: Conjunctivae, EOM and lids are normal. Right eye exhibits no discharge. Left eye exhibits no discharge.   Neck: Normal range of motion. Neck supple.   Cardiovascular: Normal rate, regular rhythm and normal heart sounds. Exam reveals no gallop and no friction rub.   No murmur heard.  Pulmonary/Chest: Effort normal and breath sounds normal. No stridor. No respiratory distress. She has no decreased breath sounds. She has no wheezes. She has no rhonchi. She has no rales.   Musculoskeletal: Normal range of motion.   Lymphadenopathy:        Head (right side): No submandibular and no tonsillar adenopathy present.        Head (left side): No submandibular and no tonsillar adenopathy present.   Neurological: She is alert and oriented to person, place, and time.   Skin: Skin is warm and dry. No rash noted. She is not diaphoretic. No erythema. No pallor.   Psychiatric: She has a normal mood and affect. Her behavior is normal.   Nursing note and vitals reviewed.      Assessment:       1. Upper respiratory tract infection, unspecified type        Office Visit on 10/17/2018   Component Date Value Ref Range Status    Rapid Strep A Screen 10/17/2018 Negative  Negative Final     Acceptable 10/17/2018 Yes   Final    Rapid Influenza A Ag 10/17/2018 Negative  Negative Final    Rapid Influenza B Ag 10/17/2018 Negative  Negative Final     Acceptable 10/17/2018 Yes   Final     Plan:         Upper respiratory tract infection, unspecified type  -     POCT rapid strep A  -     POCT Influenza A/B      Patient Instructions   Try Coricidin over-the-counter for sinus congestion.      Please follow up with your primary care provider within 2-5 days if your signs and symptoms have not resolved or worsen.     If your condition worsens or fails to improve we recommend that you receive  another evaluation at the emergency room immediately or contact your primary medical clinic to discuss your concerns.   You must understand that you have received an Urgent Care treatment only and that you may be released before all of your medical problems are known or treated. You, the patient, will arrange for follow up care as instructed.         Viral Upper Respiratory Illness (Adult)  You have a viral upper respiratory illness (URI), which is another term for the common cold. This illness is contagious during the first few days. It is spread through the air by coughing and sneezing. It may also be spread by direct contact (touching the sick person and then touching your own eyes, nose, or mouth). Frequent handwashing will decrease risk of spread. Most viral illnesses go away within 7 to 10 days with rest and simple home remedies. Sometimes the illness may last for several weeks. Antibiotics will not kill a virus, and they are generally not prescribed for this condition.    Home care  · If symptoms are severe, rest at home for the first 2 to 3 days. When you resume activity, don't let yourself get too tired.  · Avoid being exposed to cigarette smoke (yours or others).  · You may use acetaminophen or ibuprofen to control pain and fever, unless another medicine was prescribed. (Note: If you have chronic liver or kidney disease, have ever had a stomach ulcer or gastrointestinal bleeding, or are taking blood-thinning medicines, talk with your healthcare provider before using these medicines.) Aspirin should never be given to anyone under 18 years of age who is ill with a viral infection or fever. It may cause severe liver or brain damage.  · Your appetite may be poor, so a light diet is fine. Avoid dehydration by drinking 6 to 8 glasses of fluids per day (water, soft drinks, juices, tea, or soup). Extra fluids will help loosen secretions in the nose and lungs.  · Over-the-counter cold medicines will not shorten  the length of time youre sick, but they may be helpful for the following symptoms: cough, sore throat, and nasal and sinus congestion. (Note: Do not use decongestants if you have high blood pressure.)  Follow-up care  Follow up with your healthcare provider, or as advised.  When to seek medical advice  Call your healthcare provider right away if any of these occur:  · Cough with lots of colored sputum (mucus)  · Severe headache; face, neck, or ear pain  · Difficulty swallowing due to throat pain  · Fever of 100.4°F (38°C)  Call 911, or get immediate medical care  Call emergency services right away if any of these occur:  · Chest pain, shortness of breath, wheezing, or difficulty breathing  · Coughing up blood  · Inability to swallow due to throat pain  Date Last Reviewed: 9/13/2015  © 6552-2792 [employer]. [address] [place] [address]. All rights reserved. This information is not intended as a substitute for professional medical care. Always follow your healthcare professional's instructions.

## 2018-10-17 NOTE — PATIENT INSTRUCTIONS
Try Coricidin over-the-counter for sinus congestion.      Please follow up with your primary care provider within 2-5 days if your signs and symptoms have not resolved or worsen.     If your condition worsens or fails to improve we recommend that you receive another evaluation at the emergency room immediately or contact your primary medical clinic to discuss your concerns.   You must understand that you have received an Urgent Care treatment only and that you may be released before all of your medical problems are known or treated. You, the patient, will arrange for follow up care as instructed.         Viral Upper Respiratory Illness (Adult)  You have a viral upper respiratory illness (URI), which is another term for the common cold. This illness is contagious during the first few days. It is spread through the air by coughing and sneezing. It may also be spread by direct contact (touching the sick person and then touching your own eyes, nose, or mouth). Frequent handwashing will decrease risk of spread. Most viral illnesses go away within 7 to 10 days with rest and simple home remedies. Sometimes the illness may last for several weeks. Antibiotics will not kill a virus, and they are generally not prescribed for this condition.    Home care  · If symptoms are severe, rest at home for the first 2 to 3 days. When you resume activity, don't let yourself get too tired.  · Avoid being exposed to cigarette smoke (yours or others).  · You may use acetaminophen or ibuprofen to control pain and fever, unless another medicine was prescribed. (Note: If you have chronic liver or kidney disease, have ever had a stomach ulcer or gastrointestinal bleeding, or are taking blood-thinning medicines, talk with your healthcare provider before using these medicines.) Aspirin should never be given to anyone under 18 years of age who is ill with a viral infection or fever. It may cause severe liver or brain damage.  · Your appetite may  be poor, so a light diet is fine. Avoid dehydration by drinking 6 to 8 glasses of fluids per day (water, soft drinks, juices, tea, or soup). Extra fluids will help loosen secretions in the nose and lungs.  · Over-the-counter cold medicines will not shorten the length of time youre sick, but they may be helpful for the following symptoms: cough, sore throat, and nasal and sinus congestion. (Note: Do not use decongestants if you have high blood pressure.)  Follow-up care  Follow up with your healthcare provider, or as advised.  When to seek medical advice  Call your healthcare provider right away if any of these occur:  · Cough with lots of colored sputum (mucus)  · Severe headache; face, neck, or ear pain  · Difficulty swallowing due to throat pain  · Fever of 100.4°F (38°C)  Call 911, or get immediate medical care  Call emergency services right away if any of these occur:  · Chest pain, shortness of breath, wheezing, or difficulty breathing  · Coughing up blood  · Inability to swallow due to throat pain  Date Last Reviewed: 9/13/2015  © 2379-0256 [employer]. [address] [place] [address]. All rights reserved. This information is not intended as a substitute for professional medical care. Always follow your healthcare professional's instructions.

## 2018-10-18 ENCOUNTER — TELEPHONE (OUTPATIENT)
Dept: GASTROENTEROLOGY | Facility: CLINIC | Age: 38
End: 2018-10-18

## 2018-10-18 NOTE — TELEPHONE ENCOUNTER
----- Message from Kamryn Vela sent at 10/18/2018 11:51 AM CDT -----  Contact: Self- [phone]  Cody- pt called to speak with Oralia- went to [facility] to  nausea rx- but says they never received a fax with the refill request- Promethazine- please contact pt at [phone]

## 2018-10-19 ENCOUNTER — TELEPHONE (OUTPATIENT)
Dept: OBSTETRICS AND GYNECOLOGY | Facility: CLINIC | Age: 38
End: 2018-10-19

## 2018-10-19 LAB
HPV HR 12 DNA CVX QL NAA+PROBE: NEGATIVE
HPV16 AG SPEC QL: NEGATIVE
HPV18 DNA SPEC QL NAA+PROBE: NEGATIVE

## 2018-10-19 NOTE — TELEPHONE ENCOUNTER
----- Message from Alix Morales MD sent at 10/19/2018 12:00 PM CDT -----  Please send Pap and high risk HPV letter wnl

## 2018-10-22 ENCOUNTER — OFFICE VISIT (OUTPATIENT)
Dept: GASTROENTEROLOGY | Facility: CLINIC | Age: 38
End: 2018-10-22
Payer: MEDICAID

## 2018-10-22 VITALS
HEIGHT: 61 IN | HEART RATE: 88 BPM | DIASTOLIC BLOOD PRESSURE: 80 MMHG | WEIGHT: 93 LBS | BODY MASS INDEX: 17.56 KG/M2 | SYSTOLIC BLOOD PRESSURE: 115 MMHG

## 2018-10-22 DIAGNOSIS — F41.1 GENERALIZED ANXIETY DISORDER: ICD-10-CM

## 2018-10-22 DIAGNOSIS — K50.019 CROHN'S DISEASE OF SMALL INTESTINE WITH COMPLICATION: Primary | Chronic | ICD-10-CM

## 2018-10-22 PROCEDURE — 99215 OFFICE O/P EST HI 40 MIN: CPT | Mod: S$PBB,,, | Performed by: INTERNAL MEDICINE

## 2018-10-22 PROCEDURE — 99999 PR PBB SHADOW E&M-EST. PATIENT-LVL III: CPT | Mod: PBBFAC,,, | Performed by: INTERNAL MEDICINE

## 2018-10-22 PROCEDURE — 99213 OFFICE O/P EST LOW 20 MIN: CPT | Mod: PBBFAC | Performed by: INTERNAL MEDICINE

## 2018-10-22 RX ORDER — DIPHENHYDRAMINE HYDROCHLORIDE 50 MG/ML
25 INJECTION INTRAMUSCULAR; INTRAVENOUS
Status: CANCELLED | OUTPATIENT
Start: 2018-10-22

## 2018-10-22 RX ORDER — HEPARIN 100 UNIT/ML
500 SYRINGE INTRAVENOUS
Status: CANCELLED | OUTPATIENT
Start: 2018-10-22

## 2018-10-22 RX ORDER — METHYLPREDNISOLONE SOD SUCC 125 MG
40 VIAL (EA) INJECTION
Status: CANCELLED | OUTPATIENT
Start: 2018-10-22

## 2018-10-22 RX ORDER — ACETAMINOPHEN 325 MG/1
650 TABLET ORAL
Status: CANCELLED | OUTPATIENT
Start: 2018-10-22

## 2018-10-22 RX ORDER — SODIUM CHLORIDE 0.9 % (FLUSH) 0.9 %
10 SYRINGE (ML) INJECTION
Status: CANCELLED | OUTPATIENT
Start: 2018-10-22

## 2018-10-22 RX ORDER — IPRATROPIUM BROMIDE AND ALBUTEROL SULFATE 2.5; .5 MG/3ML; MG/3ML
3 SOLUTION RESPIRATORY (INHALATION)
Status: CANCELLED | OUTPATIENT
Start: 2018-10-22

## 2018-10-22 RX ORDER — EPINEPHRINE 1 MG/ML
0.3 INJECTION, SOLUTION, CONCENTRATE INTRAVENOUS
Status: CANCELLED | OUTPATIENT
Start: 2018-10-22

## 2018-10-22 NOTE — PROGRESS NOTES
Subjective:       Patient ID: Ivy Salgado is a 38 y.o. female.    Chief Complaint: Crohn's Disease    HPI  Review of Systems   Constitutional: Positive for appetite change and fatigue. Negative for activity change, chills, diaphoresis, fever and unexpected weight change.   HENT: Negative for congestion, ear pain, mouth sores, nosebleeds, postnasal drip, rhinorrhea, sinus pressure, sore throat, trouble swallowing and voice change.    Eyes: Negative for pain.   Respiratory: Negative for cough, shortness of breath and wheezing.    Cardiovascular: Positive for palpitations. Negative for chest pain and leg swelling.   Endocrine: Positive for heat intolerance.   Genitourinary: Negative for difficulty urinating, dysuria, flank pain, hematuria and menstrual problem.   Musculoskeletal: Positive for arthralgias. Negative for back pain, gait problem, joint swelling, myalgias and neck pain.   Skin: Negative for rash.   Neurological: Negative for dizziness, tremors, syncope, numbness and headaches.   Hematological: Negative for adenopathy. Does not bruise/bleed easily.   Psychiatric/Behavioral: Positive for dysphoric mood. Negative for agitation, behavioral problems, confusion and decreased concentration. The patient is nervous/anxious.        Objective:      Physical Exam   Constitutional: She is oriented to person, place, and time. She appears well-developed and well-nourished. No distress.   HENT:   Head: Normocephalic and atraumatic.   Right Ear: External ear normal.   Left Ear: External ear normal.   Nose: Nose normal.   Mouth/Throat: Oropharynx is clear and moist. No oropharyngeal exudate.   Eyes: Conjunctivae are normal. Pupils are equal, round, and reactive to light. No scleral icterus.   Neck: Normal range of motion. Neck supple. No thyromegaly present.   Cardiovascular: Normal rate, regular rhythm, normal heart sounds and intact distal pulses. Exam reveals no gallop.   No murmur heard.  Pulmonary/Chest: Effort  normal and breath sounds normal. She has no wheezes. She has no rales.   Abdominal: Soft. Normal appearance and bowel sounds are normal. She exhibits no shifting dullness, no distension, no fluid wave, no abdominal bruit and no mass. There is no hepatosplenomegaly. There is tenderness in the right lower quadrant.   Musculoskeletal: Normal range of motion. She exhibits no edema or tenderness.   Lymphadenopathy:     She has no cervical adenopathy.   Neurological: She is alert and oriented to person, place, and time. No cranial nerve deficit.   Skin: Skin is warm and dry. No rash noted.   Psychiatric: She has a normal mood and affect. Her behavior is normal. Judgment and thought content normal.       Assessment:       1. Crohn's disease of small intestine with complication    2. Generalized anxiety disorder        Plan:         This is an urgent add-on appointment.    HISTORY OF PRESENT ILLNESS:  Ivy Salgado is a 38-year-old woman with severe   Crohn disease since the age of 10.  She has had multiple interventions and   multiple surgeries.  She has had a total abdominal colectomy with end ileostomy.    She has had several admissions here recently as well.  Mostly, admissions have   related to volume depletion and dehydration related to high output from her   ileostomy.    Since her last discharge, we started her on Entocort.  However, because of the   rapid transit of pills, we were concerned about her not having absorption of the   budesonide.  Therefore, she is breaking the capsule apart and ingesting the   granules, and she feels like that may be helping.  She continues to have high   output from her ileostomy as a larger volume and this has been present for   probably the past year, ever since we started this Stelara probably a year ago.    She feels like things go through more quickly than in the past.  I have her on   IV fluids every week and that helps.  I think that has kept her out of the   hospital a number of  times by giving her that extra fluid.  She complains of   chronic lower abdominal pain.  This is a stabbing type pain, seems to be   postprandial.  It is present on most days.  This is the reason that she takes   her pain pills.  She is taking half a pill in the morning and then half a pill   usually later at night.  On most days, she will require that.  She has nausea,   but no vomiting.  The Marinol seems to help with the nausea.    SOCIAL HISTORY:  She is trying to work one day a week right now.  She is on   disability and Medicaid related to her disease.  She is going to school as well.    Right now, she gets CBD oil that she takes.  She has talked to Dr. Alvarez in   [place] about possible use of medical marijuana once becomes available in   the state.    REVIEW OF SYSTEMS:  Some anxiety and depression, episodes of flushing and   palpitations that were worked up for a neuroendocrine disorder.  I think most of   those tests were negative.    ASSESSMENT AND DECISION MAKIN.  Crohn disease, very difficult case.  She has been through Remicade, Cimzia,   Humira, methotrexate, azathioprine, and Stelara.  The evidence of disease we   have is really based on the MRI, which showed some thickening of the ileum.  Our   ileoscopies have not demonstrated an active area of inflammation, but because   of her adhesions, it is really hard to penetrate very deep into the small bowel.    I have done one of those myself and it was difficult to see much of the small   bowel.  Likewise, I am really very reluctant to try video capsule for fear of   the capsule getting stuck.  Based on the MRE, which is equivocal, but the fact   that steroids have traditionally and repeatedly always helped her with her   symptoms, makes me feel there is some degree of active Crohn disease.  I think   it is very reasonable to try the Entyvio based on that.  We reviewed its use and   risk and I think it is appropriate to use at this time.  I will  write the   therapy plan.  I told her to get that started at [facility].  2.  Anxiety disorder.  She is taking the Xanax right now.  I think that helps.    I think there is some degree of depression.  I would really like for her to see   a psychiatrist, but with her Medicaid, unfortunately no psychiatrists are   willing to see her.  I tried to give her as much reassurance as I could.  We   discussed whether or not to start her on Celexa.  This has been used in her and   tolerated in the past; however, the last time it was used, she seems to develop   a prolonged QT interval and maybe some palpitations and I think she is reluctant   to try that again.  3.  Pain medicine use.  We discussed her use of pain medicine.  She does not   feel like she is becoming tolerant to it.  We discussed and I am pleased that   her goals are to get off of it and sometime she would like to reduce as much as   possible.  She feels like she is trying to use as minimal dose as possible at   this time.  She promised to not get pain medicine from any other doctor other   than myself and that will work together to try and reduce her usage of these   medicines.  4.  Dehydration and volume depletion has been a recurrent problem.  I still have   some hopes that with her Entyvio, if that reduces any got inflammation then   maybe we will be able to have better volume status.  I do think that the   dehydration play some role in her tachycardia.    RECOMMENDATIONS:  1.  Continue Entocort.  2.  Start Entyvio.  3.  Continue therapy plan for IV fluids.  4.  I would like to get some help from Psychiatry, but I am not sure if that is   practical.  5.  We will probably not start Celexa at this time because of concerns for   cardiac side effects.  6.  I do need to get serial labs of CBC and CMP to monitor her kidney function.      FAUSTO  dd: 10/22/2018 14:02:02 (CDT)  td: 10/23/2018 10:58:39 (CDT)  Doc ID   #[id number]  Job ID #[id number]    CC:

## 2018-10-23 ENCOUNTER — TELEPHONE (OUTPATIENT)
Dept: GASTROENTEROLOGY | Facility: CLINIC | Age: 38
End: 2018-10-23

## 2018-10-23 NOTE — TELEPHONE ENCOUNTER
Patient saw Dr.James Ross in clinic yesterday and is needing to start Entyvio infusions.  Stated she would like to go to [facility] where she goes for fluids.  She stated she will contact our office with their information after she has her fluids on Wednesday 10/24.

## 2018-10-24 ENCOUNTER — INFUSION (OUTPATIENT)
Dept: INFUSION THERAPY | Facility: OTHER | Age: 38
End: 2018-10-24
Attending: INTERNAL MEDICINE
Payer: MEDICAID

## 2018-10-24 VITALS
HEART RATE: 77 BPM | RESPIRATION RATE: 17 BRPM | BODY MASS INDEX: 18.64 KG/M2 | HEIGHT: 61 IN | WEIGHT: 98.75 LBS | SYSTOLIC BLOOD PRESSURE: 128 MMHG | TEMPERATURE: 98 F | OXYGEN SATURATION: 97 % | DIASTOLIC BLOOD PRESSURE: 88 MMHG

## 2018-10-24 DIAGNOSIS — K50.019 CROHN'S DISEASE OF SMALL INTESTINE WITH COMPLICATION: Primary | ICD-10-CM

## 2018-10-24 PROCEDURE — 96361 HYDRATE IV INFUSION ADD-ON: CPT

## 2018-10-24 PROCEDURE — 63600175 PHARM REV CODE 636 W HCPCS: Performed by: INTERNAL MEDICINE

## 2018-10-24 PROCEDURE — 96360 HYDRATION IV INFUSION INIT: CPT

## 2018-10-24 PROCEDURE — 25000003 PHARM REV CODE 250: Performed by: INTERNAL MEDICINE

## 2018-10-24 RX ORDER — HEPARIN 100 UNIT/ML
500 SYRINGE INTRAVENOUS
Status: COMPLETED | OUTPATIENT
Start: 2018-10-24 | End: 2018-10-24

## 2018-10-24 RX ADMIN — SODIUM CHLORIDE: 0.9 INJECTION, SOLUTION INTRAVENOUS at 10:10

## 2018-10-24 RX ADMIN — HEPARIN 500 UNITS: 100 SYRINGE at 11:10

## 2018-10-24 RX ADMIN — SODIUM CHLORIDE: 0.9 INJECTION, SOLUTION INTRAVENOUS at 09:10

## 2018-10-24 NOTE — PLAN OF CARE
Problem: Patient Care Overview  Goal: Plan of Care Review  Outcome: Ongoing (interventions implemented as appropriate)  Pt tolerated 2L IVF without issue. VSS. AVS given.

## 2018-10-30 ENCOUNTER — PATIENT MESSAGE (OUTPATIENT)
Dept: GASTROENTEROLOGY | Facility: CLINIC | Age: 38
End: 2018-10-30

## 2018-10-31 ENCOUNTER — PATIENT MESSAGE (OUTPATIENT)
Dept: GASTROENTEROLOGY | Facility: CLINIC | Age: 38
End: 2018-10-31

## 2018-10-31 DIAGNOSIS — K50.019 CROHN'S DISEASE OF SMALL INTESTINE WITH COMPLICATION: Primary | Chronic | ICD-10-CM

## 2018-10-31 RX ORDER — CLINDAMYCIN PHOSPHATE 20 MG/G
CREAM VAGINAL NIGHTLY
Qty: 40 G | Refills: 0 | Status: SHIPPED | OUTPATIENT
Start: 2018-10-31 | End: 2018-12-03 | Stop reason: SDUPTHER

## 2018-11-02 ENCOUNTER — PATIENT MESSAGE (OUTPATIENT)
Dept: GASTROENTEROLOGY | Facility: CLINIC | Age: 38
End: 2018-11-02

## 2018-11-02 DIAGNOSIS — F41.9 ANXIETY: ICD-10-CM

## 2018-11-02 RX ORDER — ZOLPIDEM TARTRATE 10 MG/1
TABLET ORAL
Qty: 30 TABLET | Refills: 0 | Status: SHIPPED | OUTPATIENT
Start: 2018-11-02 | End: 2018-11-30 | Stop reason: SDUPTHER

## 2018-11-07 ENCOUNTER — PATIENT MESSAGE (OUTPATIENT)
Dept: GASTROENTEROLOGY | Facility: CLINIC | Age: 38
End: 2018-11-07

## 2018-11-07 DIAGNOSIS — K50.819 CROHN'S DISEASE OF BOTH SMALL AND LARGE INTESTINE WITH COMPLICATION: ICD-10-CM

## 2018-11-07 DIAGNOSIS — Z79.899 ENCOUNTER FOR LONG-TERM (CURRENT) USE OF HIGH-RISK MEDICATION: ICD-10-CM

## 2018-11-07 RX ORDER — DRONABINOL 5 MG/1
5 CAPSULE ORAL
Qty: 60 CAPSULE | Refills: 1 | Status: SHIPPED | OUTPATIENT
Start: 2018-11-07 | End: 2019-01-03 | Stop reason: SDUPTHER

## 2018-11-08 RX ORDER — DRONABINOL 2.5 MG/1
CAPSULE ORAL
Qty: 60 CAPSULE | Refills: 0 | OUTPATIENT
Start: 2018-11-08

## 2018-11-12 ENCOUNTER — PATIENT MESSAGE (OUTPATIENT)
Dept: ADMINISTRATIVE | Facility: OTHER | Age: 38
End: 2018-11-12

## 2018-11-12 RX ORDER — ALPRAZOLAM 1 MG/1
TABLET ORAL
Qty: 60 TABLET | Refills: 0 | Status: SHIPPED | OUTPATIENT
Start: 2018-11-12 | End: 2018-12-11 | Stop reason: SDUPTHER

## 2018-11-14 ENCOUNTER — TELEPHONE (OUTPATIENT)
Dept: ELECTROPHYSIOLOGY | Facility: CLINIC | Age: 38
End: 2018-11-14

## 2018-11-14 ENCOUNTER — HOSPITAL ENCOUNTER (OUTPATIENT)
Dept: CARDIOLOGY | Facility: CLINIC | Age: 38
Discharge: HOME | End: 2018-11-14
Payer: MEDICAID

## 2018-11-14 ENCOUNTER — OFFICE VISIT (OUTPATIENT)
Dept: ELECTROPHYSIOLOGY | Facility: CLINIC | Age: 38
End: 2018-11-14
Payer: MEDICAID

## 2018-11-14 VITALS
BODY MASS INDEX: 18.12 KG/M2 | WEIGHT: 96 LBS | SYSTOLIC BLOOD PRESSURE: 136 MMHG | HEIGHT: 61 IN | DIASTOLIC BLOOD PRESSURE: 74 MMHG | HEART RATE: 94 BPM

## 2018-11-14 DIAGNOSIS — R00.2 PALPITATIONS: ICD-10-CM

## 2018-11-14 DIAGNOSIS — R00.2 PALPITATIONS: Primary | ICD-10-CM

## 2018-11-14 DIAGNOSIS — K50.019 CROHN'S DISEASE OF SMALL INTESTINE WITH COMPLICATION: ICD-10-CM

## 2018-11-14 DIAGNOSIS — F41.1 GENERALIZED ANXIETY DISORDER: ICD-10-CM

## 2018-11-14 DIAGNOSIS — I10 ESSENTIAL HYPERTENSION, BENIGN: Chronic | ICD-10-CM

## 2018-11-14 DIAGNOSIS — R00.0 SINUS TACHYCARDIA: ICD-10-CM

## 2018-11-14 DIAGNOSIS — Z93.2 S/P ILEOSTOMY: Chronic | ICD-10-CM

## 2018-11-14 PROCEDURE — 93005 ELECTROCARDIOGRAM TRACING: CPT | Mod: PBBFAC | Performed by: INTERNAL MEDICINE

## 2018-11-14 PROCEDURE — 93010 ELECTROCARDIOGRAM REPORT: CPT | Mod: S$PBB,,, | Performed by: INTERNAL MEDICINE

## 2018-11-14 PROCEDURE — 99214 OFFICE O/P EST MOD 30 MIN: CPT | Mod: S$PBB,,, | Performed by: INTERNAL MEDICINE

## 2018-11-14 PROCEDURE — 99213 OFFICE O/P EST LOW 20 MIN: CPT | Mod: PBBFAC | Performed by: INTERNAL MEDICINE

## 2018-11-14 PROCEDURE — 99999 PR PBB SHADOW E&M-EST. PATIENT-LVL III: CPT | Mod: PBBFAC,,, | Performed by: INTERNAL MEDICINE

## 2018-11-14 NOTE — PROGRESS NOTES
"Subjective:    Patient ID:  Ivy Salgado is a 38 y.o. female who presents for evaluation of Palpitations      HPI 37 yo female with Crohn's disease s/p total colectomy w/ end ileostomy (2012), HTN, anxiety.  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  Has noted chest pain and palpitations over the past year.  Associated with dizziness, presyncope, nausea and diaphoresis.  Gets "blotchy" and shaky with symptoms.  Previously triggered by beer.  Has not had beer in a year.  Still having episodes.  Generally lasts 10 minutes to 30 minutes.  Gradual resolution.    No palliative factors.  Vagal maneuvers have not been helpful.  Admitted on 8/30 with initial complaints of chest pain and palpations.  ECG's consistent with sinus tachycardia.  Event monitor 9/18 20 symptomatic transmissions all c/w sinus tachycardia.  Event monitor 4/18 also revealed sinus rhythm and sinus tachycardia.  Cardiac MRI 9/5/18 normal biventricular structure and function  Placed on Toprol 25 mg BID.  ECG reveals nsr with normal baseline intervals.  Previously exercised.  Has stopped exercising.  Gets IV hydration because there are challenges to maintaining hydration.    Review of Systems   Constitution: Positive for malaise/fatigue. Negative for weakness.   Cardiovascular: Positive for palpitations. Negative for chest pain, dyspnea on exertion, irregular heartbeat, leg swelling, near-syncope, orthopnea, paroxysmal nocturnal dyspnea and syncope.   Respiratory: Negative for cough and shortness of breath.    Neurological: Positive for dizziness. Negative for light-headedness.   All other systems reviewed and are negative.       Objective:    Physical Exam   Constitutional: She is oriented to person, place, and time. She appears well-developed and well-nourished.   Eyes: Conjunctivae are normal. No scleral icterus.   Neck: No JVD present. No tracheal deviation present.   Cardiovascular: Normal rate and regular rhythm. PMI is not displaced. "   Pulmonary/Chest: Effort normal and breath sounds normal. No respiratory distress.   Abdominal: Soft. There is no hepatosplenomegaly. There is no tenderness.   Musculoskeletal: She exhibits no edema or tenderness.   Neurological: She is alert and oriented to person, place, and time.   Skin: Skin is warm and dry. No rash noted.   Psychiatric: She has a normal mood and affect. Her behavior is normal.         Assessment:       1. Palpitations    2. Sinus tachycardia    3. Essential hypertension, benign    4. Generalized anxiety disorder    5. Crohn's disease of small intestine with complication    6. S/P ileostomy         Plan:           Episodes consistent with sinus tachycardia.  This is related to adrenergic tone.  Discussed this at length.  Optimize sleep patterns.  Resume exercise.    The biggest opportunity for improvement would be to changing her hydration from once weekly to a more continuous process.  She is going to discuss this.  PRN f/u.

## 2018-11-15 ENCOUNTER — LAB VISIT (OUTPATIENT)
Dept: LAB | Facility: HOSPITAL | Age: 38
End: 2018-11-15
Attending: INTERNAL MEDICINE
Payer: MEDICAID

## 2018-11-15 DIAGNOSIS — K50.019 CROHN'S DISEASE OF SMALL INTESTINE WITH COMPLICATION: Chronic | ICD-10-CM

## 2018-11-15 PROCEDURE — 86480 TB TEST CELL IMMUN MEASURE: CPT

## 2018-11-15 PROCEDURE — 36415 COLL VENOUS BLD VENIPUNCTURE: CPT

## 2018-11-19 ENCOUNTER — PATIENT MESSAGE (OUTPATIENT)
Dept: INTERNAL MEDICINE | Facility: CLINIC | Age: 38
End: 2018-11-19

## 2018-11-19 ENCOUNTER — PATIENT MESSAGE (OUTPATIENT)
Dept: GASTROENTEROLOGY | Facility: CLINIC | Age: 38
End: 2018-11-19

## 2018-11-19 DIAGNOSIS — K50.819 CROHN'S DISEASE OF SMALL AND LARGE INTESTINES WITH COMPLICATION: ICD-10-CM

## 2018-11-19 LAB
M TB IFN-G CD4+ BCKGRND COR BLD-ACNC: 0.01 IU/ML
MITOGEN IGNF BCKGRD COR BLD-ACNC: >10 IU/ML
MITOGEN IGNF BCKGRD COR BLD-ACNC: NEGATIVE [IU]/ML
NIL: 0.04 IU/ML
TB2 - NIL: 0.01 IU/ML

## 2018-11-19 RX ORDER — DIPHENOXYLATE HYDROCHLORIDE AND ATROPINE SULFATE 2.5; .025 MG/1; MG/1
1 TABLET ORAL 4 TIMES DAILY PRN
Qty: 100 TABLET | Refills: 0 | Status: SHIPPED | OUTPATIENT
Start: 2018-11-19 | End: 2018-12-24 | Stop reason: SDUPTHER

## 2018-11-19 RX ORDER — OXYCODONE AND ACETAMINOPHEN 10; 325 MG/1; MG/1
1 TABLET ORAL EVERY 6 HOURS PRN
Qty: 40 TABLET | Refills: 0 | Status: SHIPPED | OUTPATIENT
Start: 2018-11-19 | End: 2018-12-24 | Stop reason: SDUPTHER

## 2018-11-20 ENCOUNTER — INFUSION (OUTPATIENT)
Dept: INFUSION THERAPY | Facility: OTHER | Age: 38
End: 2018-11-20
Attending: INTERNAL MEDICINE
Payer: MEDICAID

## 2018-11-20 VITALS
HEART RATE: 87 BPM | RESPIRATION RATE: 16 BRPM | BODY MASS INDEX: 18.94 KG/M2 | HEIGHT: 61 IN | SYSTOLIC BLOOD PRESSURE: 97 MMHG | TEMPERATURE: 98 F | DIASTOLIC BLOOD PRESSURE: 56 MMHG | WEIGHT: 100.31 LBS | OXYGEN SATURATION: 99 %

## 2018-11-20 DIAGNOSIS — K50.019 CROHN'S DISEASE OF SMALL INTESTINE WITH COMPLICATION: Primary | ICD-10-CM

## 2018-11-20 LAB
25(OH)D3+25(OH)D2 SERPL-MCNC: 19 NG/ML
ALBUMIN SERPL BCP-MCNC: 3.9 G/DL
ALBUMIN SERPL BCP-MCNC: 3.9 G/DL
ALP SERPL-CCNC: 96 U/L
ALP SERPL-CCNC: 96 U/L
ALT SERPL W/O P-5'-P-CCNC: 16 U/L
ALT SERPL W/O P-5'-P-CCNC: 16 U/L
ANION GAP SERPL CALC-SCNC: 9 MMOL/L
AST SERPL-CCNC: 23 U/L
AST SERPL-CCNC: 23 U/L
BASOPHILS # BLD AUTO: 0.02 K/UL
BASOPHILS NFR BLD: 0.3 %
BILIRUB DIRECT SERPL-MCNC: 0.1 MG/DL
BILIRUB SERPL-MCNC: 0.3 MG/DL
BILIRUB SERPL-MCNC: 0.3 MG/DL
BUN SERPL-MCNC: 9 MG/DL
CALCIUM SERPL-MCNC: 9.5 MG/DL
CHLORIDE SERPL-SCNC: 102 MMOL/L
CO2 SERPL-SCNC: 25 MMOL/L
CREAT SERPL-MCNC: 0.8 MG/DL
DIFFERENTIAL METHOD: ABNORMAL
EOSINOPHIL # BLD AUTO: 0 K/UL
EOSINOPHIL NFR BLD: 0.6 %
ERYTHROCYTE [DISTWIDTH] IN BLOOD BY AUTOMATED COUNT: 13.7 %
EST. GFR  (AFRICAN AMERICAN): >60 ML/MIN/1.73 M^2
EST. GFR  (NON AFRICAN AMERICAN): >60 ML/MIN/1.73 M^2
GLUCOSE SERPL-MCNC: 110 MG/DL
HCT VFR BLD AUTO: 34.6 %
HGB BLD-MCNC: 11.2 G/DL
LYMPHOCYTES # BLD AUTO: 2.4 K/UL
LYMPHOCYTES NFR BLD: 36 %
MCH RBC QN AUTO: 27.8 PG
MCHC RBC AUTO-ENTMCNC: 32.4 G/DL
MCV RBC AUTO: 86 FL
MONOCYTES # BLD AUTO: 0.8 K/UL
MONOCYTES NFR BLD: 11.5 %
NEUTROPHILS # BLD AUTO: 3.5 K/UL
NEUTROPHILS NFR BLD: 51.5 %
PLATELET # BLD AUTO: 314 K/UL
PMV BLD AUTO: 9.6 FL
POTASSIUM SERPL-SCNC: 3.7 MMOL/L
PROT SERPL-MCNC: 7.7 G/DL
PROT SERPL-MCNC: 7.7 G/DL
RBC # BLD AUTO: 4.03 M/UL
SODIUM SERPL-SCNC: 136 MMOL/L
VIT B12 SERPL-MCNC: 288 PG/ML
WBC # BLD AUTO: 6.7 K/UL

## 2018-11-20 PROCEDURE — 85025 COMPLETE CBC W/AUTO DIFF WBC: CPT

## 2018-11-20 PROCEDURE — 25000003 PHARM REV CODE 250: Performed by: INTERNAL MEDICINE

## 2018-11-20 PROCEDURE — 96413 CHEMO IV INFUSION 1 HR: CPT

## 2018-11-20 PROCEDURE — 63600175 PHARM REV CODE 636 W HCPCS: Mod: JG | Performed by: INTERNAL MEDICINE

## 2018-11-20 PROCEDURE — 80053 COMPREHEN METABOLIC PANEL: CPT

## 2018-11-20 PROCEDURE — 96361 HYDRATE IV INFUSION ADD-ON: CPT

## 2018-11-20 PROCEDURE — 82306 VITAMIN D 25 HYDROXY: CPT

## 2018-11-20 PROCEDURE — 82607 VITAMIN B-12: CPT

## 2018-11-20 RX ORDER — HEPARIN 100 UNIT/ML
500 SYRINGE INTRAVENOUS
Status: COMPLETED | OUTPATIENT
Start: 2018-11-20 | End: 2018-11-20

## 2018-11-20 RX ORDER — METHYLPREDNISOLONE SOD SUCC 125 MG
40 VIAL (EA) INJECTION
Status: CANCELLED | OUTPATIENT
Start: 2018-11-20

## 2018-11-20 RX ORDER — SODIUM CHLORIDE 0.9 % (FLUSH) 0.9 %
10 SYRINGE (ML) INJECTION
Status: DISCONTINUED | OUTPATIENT
Start: 2018-11-20 | End: 2018-11-20 | Stop reason: HOSPADM

## 2018-11-20 RX ORDER — EPINEPHRINE 1 MG/ML
0.3 INJECTION, SOLUTION, CONCENTRATE INTRAVENOUS
Status: CANCELLED | OUTPATIENT
Start: 2018-11-20

## 2018-11-20 RX ORDER — IPRATROPIUM BROMIDE AND ALBUTEROL SULFATE 2.5; .5 MG/3ML; MG/3ML
3 SOLUTION RESPIRATORY (INHALATION)
Status: CANCELLED | OUTPATIENT
Start: 2018-11-20

## 2018-11-20 RX ORDER — HEPARIN 100 UNIT/ML
500 SYRINGE INTRAVENOUS
Status: DISCONTINUED | OUTPATIENT
Start: 2018-11-20 | End: 2018-11-20 | Stop reason: HOSPADM

## 2018-11-20 RX ORDER — ACETAMINOPHEN 325 MG/1
650 TABLET ORAL
Status: CANCELLED | OUTPATIENT
Start: 2018-11-20

## 2018-11-20 RX ORDER — HEPARIN 100 UNIT/ML
500 SYRINGE INTRAVENOUS
Status: CANCELLED | OUTPATIENT
Start: 2018-11-20

## 2018-11-20 RX ORDER — SODIUM CHLORIDE 0.9 % (FLUSH) 0.9 %
10 SYRINGE (ML) INJECTION
Status: CANCELLED | OUTPATIENT
Start: 2018-11-20

## 2018-11-20 RX ORDER — DIPHENHYDRAMINE HYDROCHLORIDE 50 MG/ML
25 INJECTION INTRAMUSCULAR; INTRAVENOUS
Status: CANCELLED | OUTPATIENT
Start: 2018-11-20

## 2018-11-20 RX ADMIN — SODIUM CHLORIDE: 0.9 INJECTION, SOLUTION INTRAVENOUS at 10:11

## 2018-11-20 RX ADMIN — HEPARIN 500 UNITS: 100 SYRINGE at 01:11

## 2018-11-20 RX ADMIN — VEDOLIZUMAB 300 MG: 300 INJECTION, POWDER, LYOPHILIZED, FOR SOLUTION INTRAVENOUS at 12:11

## 2018-11-20 RX ADMIN — SODIUM CHLORIDE: 0.9 INJECTION, SOLUTION INTRAVENOUS at 11:11

## 2018-11-20 NOTE — PLAN OF CARE
Problem: Patient Care Overview  Goal: Plan of Care Review  Outcome: Ongoing (interventions implemented as appropriate)  Pt tolerated 2L IVF and 1st Entyvio infusion without issue. VSS. AVS given. Pt to return 12/4 for next infusion.

## 2018-11-21 ENCOUNTER — LAB VISIT (OUTPATIENT)
Dept: LAB | Facility: OTHER | Age: 38
End: 2018-11-21
Attending: INTERNAL MEDICINE
Payer: MEDICAID

## 2018-11-21 ENCOUNTER — PATIENT MESSAGE (OUTPATIENT)
Dept: INTERNAL MEDICINE | Facility: CLINIC | Age: 38
End: 2018-11-21

## 2018-11-21 ENCOUNTER — TELEPHONE (OUTPATIENT)
Dept: INTERNAL MEDICINE | Facility: CLINIC | Age: 38
End: 2018-11-21

## 2018-11-21 DIAGNOSIS — N39.0 URINARY TRACT INFECTION WITH HEMATURIA, SITE UNSPECIFIED: ICD-10-CM

## 2018-11-21 DIAGNOSIS — N39.0 URINARY TRACT INFECTION WITH HEMATURIA, SITE UNSPECIFIED: Primary | ICD-10-CM

## 2018-11-21 DIAGNOSIS — R31.9 URINARY TRACT INFECTION WITH HEMATURIA, SITE UNSPECIFIED: ICD-10-CM

## 2018-11-21 DIAGNOSIS — R31.9 URINARY TRACT INFECTION WITH HEMATURIA, SITE UNSPECIFIED: Primary | ICD-10-CM

## 2018-11-21 LAB
BACTERIA #/AREA URNS HPF: ABNORMAL /HPF
BILIRUB UR QL STRIP: NEGATIVE
CLARITY UR: ABNORMAL
COLOR UR: YELLOW
GLUCOSE UR QL STRIP: NEGATIVE
HGB UR QL STRIP: NEGATIVE
KETONES UR QL STRIP: NEGATIVE
LEUKOCYTE ESTERASE UR QL STRIP: ABNORMAL
MICROSCOPIC COMMENT: ABNORMAL
NITRITE UR QL STRIP: NEGATIVE
PH UR STRIP: 6 [PH] (ref 5–8)
PROT UR QL STRIP: NEGATIVE
RBC #/AREA URNS HPF: 2 /HPF (ref 0–4)
SP GR UR STRIP: 1.02 (ref 1–1.03)
SQUAMOUS #/AREA URNS HPF: 3 /HPF
URN SPEC COLLECT METH UR: ABNORMAL
UROBILINOGEN UR STRIP-ACNC: NEGATIVE EU/DL
WBC #/AREA URNS HPF: 78 /HPF (ref 0–5)

## 2018-11-21 PROCEDURE — 87088 URINE BACTERIA CULTURE: CPT

## 2018-11-21 PROCEDURE — 87086 URINE CULTURE/COLONY COUNT: CPT

## 2018-11-21 PROCEDURE — 87077 CULTURE AEROBIC IDENTIFY: CPT

## 2018-11-21 PROCEDURE — 87186 SC STD MICRODIL/AGAR DIL: CPT

## 2018-11-21 PROCEDURE — 81000 URINALYSIS NONAUTO W/SCOPE: CPT

## 2018-11-21 RX ORDER — FLUCONAZOLE 150 MG/1
150 TABLET ORAL DAILY
Qty: 1 TABLET | Refills: 1 | Status: SHIPPED | OUTPATIENT
Start: 2018-11-21 | End: 2018-11-22

## 2018-11-21 RX ORDER — AMOXICILLIN AND CLAVULANATE POTASSIUM 875; 125 MG/1; MG/1
1 TABLET, FILM COATED ORAL EVERY 12 HOURS
Qty: 14 TABLET | Refills: 0 | Status: SHIPPED | OUTPATIENT
Start: 2018-11-21 | End: 2018-12-04

## 2018-11-21 NOTE — TELEPHONE ENCOUNTER
Called pt and informed her that her urine orders was inputted and that she can go tot lab of the 2nd floor by [employer]iqu[employer] coffee give an urine sample.  pt showed verbal understanding

## 2018-11-21 NOTE — TELEPHONE ENCOUNTER
----- Message from Shaunna Larkin sent at 11/21/2018  8:06 AM CST -----  Contact: pt            Name of Who is Calling: Ivy      What is the request in detail: requesting ordered for a urine specimen. Pt states she may possibly have a UTI, she has been having blood in her urine.Please call and advise      Can the clinic reply by [facility]:yes      What Number to Call Back if not in [facility]: [phone]

## 2018-11-24 ENCOUNTER — PATIENT MESSAGE (OUTPATIENT)
Dept: INTERNAL MEDICINE | Facility: CLINIC | Age: 38
End: 2018-11-24

## 2018-11-24 LAB — BACTERIA UR CULT: NORMAL

## 2018-11-26 ENCOUNTER — PATIENT MESSAGE (OUTPATIENT)
Dept: INTERNAL MEDICINE | Facility: CLINIC | Age: 38
End: 2018-11-26

## 2018-11-27 ENCOUNTER — OFFICE VISIT (OUTPATIENT)
Dept: ENDOCRINOLOGY | Facility: CLINIC | Age: 38
End: 2018-11-27
Payer: MEDICAID

## 2018-11-27 ENCOUNTER — LAB VISIT (OUTPATIENT)
Dept: LAB | Facility: HOSPITAL | Age: 38
End: 2018-11-27
Payer: MEDICAID

## 2018-11-27 VITALS
BODY MASS INDEX: 18.61 KG/M2 | WEIGHT: 98.56 LBS | RESPIRATION RATE: 18 BRPM | SYSTOLIC BLOOD PRESSURE: 110 MMHG | HEART RATE: 64 BPM | DIASTOLIC BLOOD PRESSURE: 70 MMHG | HEIGHT: 61 IN

## 2018-11-27 DIAGNOSIS — E55.9 VITAMIN D DEFICIENCY: ICD-10-CM

## 2018-11-27 DIAGNOSIS — R00.2 PALPITATIONS: ICD-10-CM

## 2018-11-27 DIAGNOSIS — K50.019 CROHN'S DISEASE OF SMALL INTESTINE WITH COMPLICATION: Chronic | ICD-10-CM

## 2018-11-27 DIAGNOSIS — E04.2 MULTIPLE THYROID NODULES: ICD-10-CM

## 2018-11-27 DIAGNOSIS — M85.9 LOW BONE DENSITY: Primary | ICD-10-CM

## 2018-11-27 PROCEDURE — 99213 OFFICE O/P EST LOW 20 MIN: CPT | Mod: PBBFAC | Performed by: INTERNAL MEDICINE

## 2018-11-27 PROCEDURE — 82384 ASSAY THREE CATECHOLAMINES: CPT

## 2018-11-27 PROCEDURE — 83835 ASSAY OF METANEPHRINES: CPT

## 2018-11-27 PROCEDURE — 99214 OFFICE O/P EST MOD 30 MIN: CPT | Mod: S$PBB,,, | Performed by: INTERNAL MEDICINE

## 2018-11-27 PROCEDURE — 36415 COLL VENOUS BLD VENIPUNCTURE: CPT

## 2018-11-27 PROCEDURE — 99999 PR PBB SHADOW E&M-EST. PATIENT-LVL III: CPT | Mod: PBBFAC,,, | Performed by: INTERNAL MEDICINE

## 2018-11-27 NOTE — PATIENT INSTRUCTIONS
Increase Vitamin D 4000 iU, and okay to take two tablets three times weekly (M/W/F).    You do not need to take alendronate when you are not on steroids.  It is therapy to take to prevent bone loss while on steroids.

## 2018-11-27 NOTE — PROGRESS NOTES
"Subjective:     Patient ID: Ivy Salgado is a 38 y.o. female.    Chief Complaint: Vitamin D Deficiency    HPI:     Ms. Salgado is a 38 y.o. female who is here for a follow-up visit for evaluationof  low bone mass with atrumatic non-vertebral fractures (two foot and collarbone fractures) while on prednisone 10 mg for crohn's disease. Diagnosed with bone density from 1/2018.      At her last visit, we started alendronate 35 mg weekly while on prednisone to prevent fractures.   Since then she reports that she is not taking this medication.        Fracture history:   Sustained foot fracture four months ago. This occurred while getting up from a chair and accidentally hit coffee table with her foot. X ray done at urgent care demonstrated fracture. Previous foot fracture occurred 2016. Prior to this fracture collar bone in 2010 while "horsing around," fell on to a couch and sustained fracture.      Has used prednisone 10 mg for the past year, but has chronic intermttent prednisone use since Crohn's diagnosis 27 years ago.      Just started entyvio (vediluzamab)    Other medications:  Stelara side effects, used for nine months  Simzia for ten years   Humera - lupus like reaction  remicade - six years  Imuran - pancretitis  Methotrexate - agranulocytosis     Osteoporosis Risk Factor Assessment:     Menarche occurred at 14 years.   Hysterectomy with right oophorectomy (2015). Recent of pelvis right ovarian cyst.  Family history of endometrial cancer.      Her menstrual cycles were normal throughout her reproductive life. Symptoms of hot flushing and night sweats, has had hormones checked in the past which were not indicative of ovarian failure. Denies vaginal dryness or painful intercourse.      Weight fluctuating between 97 - 100 lbs, currently on 101 lbs. Reports normal weight is 105 - 110 lbs.      Denies fractures to her wrist, hip or spine. Denies loss of height.      Maternal grandmother with osteoporosis. Denies " "family history of stooped posture, or fractures of hip.       Has used OCPs for 16 years (fractured collar bone and foot). Did have a two years break and was able to have one child, she is [age] years old.   Denies use anticoagulants, proton pump inhibitors, diabetes medications or antiseizure medications.      She reports history of kidney stones and UTI with stelera use. Denies anemia or kidney disease.      Supplements:  Women's MVI  Vitamin D3 - 4000 IU daily      Dietary:  Cheese and ice cream.        Review of Systems   Constitutional: Negative for unexpected weight change.   Eyes: Negative for visual disturbance.   Respiratory: Negative for shortness of breath.    Cardiovascular: Negative for chest pain.   Gastrointestinal: Negative for abdominal pain.   Genitourinary: Negative for urgency.   Musculoskeletal: Negative for arthralgias.   Skin: Negative for wound.   Neurological: Negative for headaches.   Hematological: Does not bruise/bleed easily.   Psychiatric/Behavioral: Negative for sleep disturbance.        Objective:     Physical Exam   Neck: No thyromegaly present.   Non tender, moves with deglutition     Cardiovascular: Normal rate.   Pulmonary/Chest: Effort normal.   Abdominal: Soft. There is no tenderness. There is no guarding.   R colostomy   Musculoskeletal: She exhibits no edema.   Vitals reviewed.      Vitals:    11/27/18 1253   BP: 110/70   Pulse: 64   Resp: 18   Weight: 44.7 kg (98 lb 8.7 oz)   Height: 5' 1" (1.549 m)     Pelvic US 7/2018    Right ovary:  Absent by history.  In the right adnexa, however, there is a complex cystic structure measuring 5.3 x 5.3 x 5.2 cm, the largest single cyst within this structure measures 4.0 cm.    Assessment/Plan:     1. Low bone density    2. Multiple thyroid nodules    3. Vitamin D deficiency    4. Crohn's disease of small intestine with complication    5. Palpitations      Low bone density  Z score borderline osteopenic, with osteopenic T scores.  History " of low impact foot fracture while on steroids.  Risk factors: , history of fracture, low weight, history of steroid use  Previously recommended alendronate weekly while on steroids.  However, no longer prescribed steroids.  Discussed negative effects of bone health while on chronic steroids.  Recommend discontinuing alendronate (patient not taking) while on steroids.  Educated patient to call endocrine if steroids restarted, would recommend restarting at that time.  Continue vitamin D supplement (4000iU tablets daily, two tablets 3 times weekly).  Calcium supplement per PO intake and MVN.  Repeat DXA 1/2020    Multi thyroid nodules  Multiple subcentimeter nodules.  Do not meet criteria for biopsy.  TSH normal.  Recommend repeat US in one year, repeat TSH at that time.    Vitamin D insufficiency  Ergo supplement not absorbing (seen whole in colostomy bag).  Continue vitamin D supplement (4000iU tablets daily, two tablets 3 times weekly).    Crohn's disease  Decreased absorption of ergo.  Recommend cholecalciferol supplement daily as above.    Palpitations/flushing spells  Previous metanephrines, catecholamines, 5HIAA, chromogranin A levels unremarkable.  Catecholamine levels mildly above normal.  Plan to repeat plasma catecholamine and metanephrines to compare to previous.  Continue other management per cardiology/GI/OBGYN      RTC in one year  US and labs prior to visit    Cara Landry MD  Endocrinology Fellow     This case has been reviewed with staff, Dr. Lazaro.     Ms. Salgado is a 38 year old woman who is here for low bone density with Crohn's disease not on steroids.   For now will continue with conservative management only including vitamin D, calcium and exercise.   Agree with using cholecalciferol only not ergo as she is not absorbing.   F/u in one year unless begins steroid treatments.     I, Jodie Lazaro, have personally taken the history and physical exam and I agree with Dr. Landry's  assessment and plan.

## 2018-11-30 DIAGNOSIS — F41.9 ANXIETY: ICD-10-CM

## 2018-11-30 RX ORDER — ZOLPIDEM TARTRATE 10 MG/1
TABLET ORAL
Qty: 30 TABLET | Refills: 0 | Status: SHIPPED | OUTPATIENT
Start: 2018-11-30 | End: 2018-12-31 | Stop reason: SDUPTHER

## 2018-12-02 ENCOUNTER — PATIENT MESSAGE (OUTPATIENT)
Dept: INTERNAL MEDICINE | Facility: CLINIC | Age: 38
End: 2018-12-02

## 2018-12-02 LAB
METANEPH FREE SERPL-MCNC: 55 PG/ML
METANEPHS SERPL-MCNC: 215 PG/ML
NORMETANEPH FREE SERPL-MCNC: 160 PG/ML

## 2018-12-03 ENCOUNTER — PATIENT MESSAGE (OUTPATIENT)
Dept: UROLOGY | Facility: CLINIC | Age: 38
End: 2018-12-03

## 2018-12-03 ENCOUNTER — PATIENT MESSAGE (OUTPATIENT)
Dept: GASTROENTEROLOGY | Facility: CLINIC | Age: 38
End: 2018-12-03

## 2018-12-03 RX ORDER — CLINDAMYCIN PHOSPHATE 20 MG/G
CREAM VAGINAL NIGHTLY
Qty: 40 G | Refills: 0 | Status: SHIPPED | OUTPATIENT
Start: 2018-12-03 | End: 2019-01-23 | Stop reason: SDUPTHER

## 2018-12-03 RX ORDER — TERCONAZOLE 4 MG/G
CREAM VAGINAL
Qty: 45 G | Refills: 0 | Status: SHIPPED | OUTPATIENT
Start: 2018-12-03 | End: 2019-01-23 | Stop reason: SDUPTHER

## 2018-12-04 ENCOUNTER — OFFICE VISIT (OUTPATIENT)
Dept: UROLOGY | Facility: CLINIC | Age: 38
End: 2018-12-04
Payer: MEDICAID

## 2018-12-04 ENCOUNTER — INFUSION (OUTPATIENT)
Dept: INFUSION THERAPY | Facility: OTHER | Age: 38
End: 2018-12-04
Attending: INTERNAL MEDICINE
Payer: MEDICAID

## 2018-12-04 ENCOUNTER — PATIENT MESSAGE (OUTPATIENT)
Dept: OBSTETRICS AND GYNECOLOGY | Facility: CLINIC | Age: 38
End: 2018-12-04

## 2018-12-04 VITALS
WEIGHT: 99.63 LBS | HEIGHT: 61 IN | DIASTOLIC BLOOD PRESSURE: 57 MMHG | HEIGHT: 61 IN | WEIGHT: 99 LBS | HEART RATE: 86 BPM | OXYGEN SATURATION: 100 % | SYSTOLIC BLOOD PRESSURE: 112 MMHG | TEMPERATURE: 97 F | BODY MASS INDEX: 18.81 KG/M2 | BODY MASS INDEX: 18.69 KG/M2 | HEART RATE: 83 BPM | DIASTOLIC BLOOD PRESSURE: 72 MMHG | SYSTOLIC BLOOD PRESSURE: 100 MMHG | RESPIRATION RATE: 16 BRPM

## 2018-12-04 DIAGNOSIS — R11.2 NAUSEA, VOMITING AND DIARRHEA: ICD-10-CM

## 2018-12-04 DIAGNOSIS — R39.15 URINARY URGENCY: ICD-10-CM

## 2018-12-04 DIAGNOSIS — R19.7 NAUSEA, VOMITING AND DIARRHEA: ICD-10-CM

## 2018-12-04 DIAGNOSIS — R30.0 DYSURIA: Primary | ICD-10-CM

## 2018-12-04 DIAGNOSIS — K50.019 CROHN'S DISEASE OF SMALL INTESTINE WITH COMPLICATION: Primary | ICD-10-CM

## 2018-12-04 LAB
BILIRUB SERPL-MCNC: ABNORMAL MG/DL
BLOOD URINE, POC: ABNORMAL
COLOR, POC UA: ABNORMAL
GLUCOSE UR QL STRIP: NORMAL
KETONES UR QL STRIP: ABNORMAL
LEUKOCYTE ESTERASE URINE, POC: ABNORMAL
NITRITE, POC UA: ABNORMAL
PH, POC UA: 5
PROTEIN, POC: ABNORMAL
SPECIFIC GRAVITY, POC UA: 1.01
UROBILINOGEN, POC UA: NORMAL

## 2018-12-04 PROCEDURE — 96413 CHEMO IV INFUSION 1 HR: CPT

## 2018-12-04 PROCEDURE — 87077 CULTURE AEROBIC IDENTIFY: CPT

## 2018-12-04 PROCEDURE — 96365 THER/PROPH/DIAG IV INF INIT: CPT

## 2018-12-04 PROCEDURE — 63600175 PHARM REV CODE 636 W HCPCS: Mod: JG | Performed by: INTERNAL MEDICINE

## 2018-12-04 PROCEDURE — 87186 SC STD MICRODIL/AGAR DIL: CPT

## 2018-12-04 PROCEDURE — 96361 HYDRATE IV INFUSION ADD-ON: CPT

## 2018-12-04 PROCEDURE — 81002 URINALYSIS NONAUTO W/O SCOPE: CPT | Mod: S$GLB,,, | Performed by: NURSE PRACTITIONER

## 2018-12-04 PROCEDURE — 25000003 PHARM REV CODE 250: Performed by: INTERNAL MEDICINE

## 2018-12-04 PROCEDURE — 87088 URINE BACTERIA CULTURE: CPT

## 2018-12-04 PROCEDURE — 87086 URINE CULTURE/COLONY COUNT: CPT

## 2018-12-04 PROCEDURE — 99213 OFFICE O/P EST LOW 20 MIN: CPT | Mod: 25,S$GLB,, | Performed by: NURSE PRACTITIONER

## 2018-12-04 RX ORDER — ACETAMINOPHEN 325 MG/1
650 TABLET ORAL
Status: CANCELLED | OUTPATIENT
Start: 2018-12-04

## 2018-12-04 RX ORDER — HEPARIN 100 UNIT/ML
500 SYRINGE INTRAVENOUS
Status: CANCELLED | OUTPATIENT
Start: 2018-12-20

## 2018-12-04 RX ORDER — DIPHENHYDRAMINE HYDROCHLORIDE 50 MG/ML
25 INJECTION INTRAMUSCULAR; INTRAVENOUS
Status: CANCELLED | OUTPATIENT
Start: 2018-12-04

## 2018-12-04 RX ORDER — SODIUM CHLORIDE 0.9 % (FLUSH) 0.9 %
10 SYRINGE (ML) INJECTION
Status: DISCONTINUED | OUTPATIENT
Start: 2018-12-04 | End: 2018-12-04 | Stop reason: HOSPADM

## 2018-12-04 RX ORDER — ACETAMINOPHEN 325 MG/1
650 TABLET ORAL
Status: ACTIVE | OUTPATIENT
Start: 2018-12-04 | End: 2018-12-04

## 2018-12-04 RX ORDER — HEPARIN 100 UNIT/ML
500 SYRINGE INTRAVENOUS
Status: DISPENSED | OUTPATIENT
Start: 2018-12-04 | End: 2018-12-04

## 2018-12-04 RX ORDER — EPINEPHRINE 1 MG/ML
0.3 INJECTION, SOLUTION, CONCENTRATE INTRAVENOUS
Status: CANCELLED | OUTPATIENT
Start: 2018-12-04

## 2018-12-04 RX ORDER — SODIUM CHLORIDE 0.9 % (FLUSH) 0.9 %
10 SYRINGE (ML) INJECTION
Status: CANCELLED | OUTPATIENT
Start: 2018-12-04

## 2018-12-04 RX ORDER — VALACYCLOVIR HYDROCHLORIDE 500 MG/1
TABLET, FILM COATED ORAL
Refills: 2 | COMMUNITY
Start: 2018-11-14 | End: 2019-03-25 | Stop reason: SDUPTHER

## 2018-12-04 RX ORDER — EPINEPHRINE 1 MG/ML
0.3 INJECTION, SOLUTION, CONCENTRATE INTRAVENOUS
Status: DISPENSED | OUTPATIENT
Start: 2018-12-04 | End: 2018-12-04

## 2018-12-04 RX ORDER — HEPARIN 100 UNIT/ML
500 SYRINGE INTRAVENOUS
Status: CANCELLED | OUTPATIENT
Start: 2018-12-04

## 2018-12-04 RX ORDER — METHYLPREDNISOLONE SOD SUCC 125 MG
40 VIAL (EA) INJECTION
Status: ACTIVE | OUTPATIENT
Start: 2018-12-04 | End: 2018-12-04

## 2018-12-04 RX ORDER — METHYLPREDNISOLONE SOD SUCC 125 MG
40 VIAL (EA) INJECTION
Status: CANCELLED | OUTPATIENT
Start: 2018-12-04

## 2018-12-04 RX ORDER — IPRATROPIUM BROMIDE AND ALBUTEROL SULFATE 2.5; .5 MG/3ML; MG/3ML
3 SOLUTION RESPIRATORY (INHALATION)
Status: DISPENSED | OUTPATIENT
Start: 2018-12-04 | End: 2018-12-04

## 2018-12-04 RX ORDER — HEPARIN 100 UNIT/ML
500 SYRINGE INTRAVENOUS
Status: CANCELLED | OUTPATIENT
Start: 2019-01-03

## 2018-12-04 RX ORDER — IPRATROPIUM BROMIDE AND ALBUTEROL SULFATE 2.5; .5 MG/3ML; MG/3ML
3 SOLUTION RESPIRATORY (INHALATION)
Status: CANCELLED | OUTPATIENT
Start: 2018-12-04

## 2018-12-04 RX ORDER — DIPHENHYDRAMINE HYDROCHLORIDE 50 MG/ML
25 INJECTION INTRAMUSCULAR; INTRAVENOUS
Status: ACTIVE | OUTPATIENT
Start: 2018-12-04 | End: 2018-12-04

## 2018-12-04 RX ORDER — HEPARIN 100 UNIT/ML
500 SYRINGE INTRAVENOUS
Status: COMPLETED | OUTPATIENT
Start: 2018-12-04 | End: 2018-12-04

## 2018-12-04 RX ADMIN — SODIUM CHLORIDE: 0.9 INJECTION, SOLUTION INTRAVENOUS at 10:12

## 2018-12-04 RX ADMIN — SODIUM CHLORIDE: 0.9 INJECTION, SOLUTION INTRAVENOUS at 09:12

## 2018-12-04 RX ADMIN — VEDOLIZUMAB 300 MG: 300 INJECTION, POWDER, LYOPHILIZED, FOR SOLUTION INTRAVENOUS at 11:12

## 2018-12-04 RX ADMIN — HEPARIN 500 UNITS: 100 SYRINGE at 12:12

## 2018-12-04 NOTE — PLAN OF CARE
Problem: Patient Care Overview  Goal: Plan of Care Review  Outcome: Ongoing (interventions implemented as appropriate)  Entyvio and 2L IVF administered, patient tolerated well. VSS, NAD. D/C'd home with self.

## 2018-12-04 NOTE — PROGRESS NOTES
"Subjective:      Ivy Salgado is a 38 y.o. female who returns today regarding her urinary symptoms. She is an established patient of Dr. Muniz and is new to me today.    She had a positive urine culture on 11/21 and completed Augmentin x 7 days. Following completion of the antibiotics, her symptoms resolved; however over the last few days she reports urgency and dysuria. Denies frequency, hematuria, and fever/chills. Reports mild intermittent flank discomfort R>L.     The following portions of the patient's history were reviewed and updated as appropriate: allergies, current medications, past family history, past medical history, past social history, past surgical history and problem list.    Review of Systems  Constitutional: no fever or chills  ENT: no nasal congestion or sore throat  Respiratory: no cough or shortness of breath  Cardiovascular: no chest pain or palpitations  Gastrointestinal: no nausea or vomiting, tolerating diet  Genitourinary: as per HPI  Hematologic/Lymphatic: no easy bruising or lymphadenopathy  Musculoskeletal: no arthralgias or myalgias  Neurological: no seizures or tremors  Behavioral/Psych: no auditory or visual hallucinations     Objective:   Vital Signs:/72 (BP Location: Right arm, Patient Position: Sitting, BP Method: Large (Automatic))   Pulse 83   Ht 5' 1" (1.549 m)   Wt 44.9 kg (99 lb)   BMI 18.71 kg/m²     Physical Exam   General: alert and oriented, no acute distress  Head: normocephalic, atraumatic  Neck: supple, no lymphadenopathy, normal ROM, no masses  Respiratory: Symmetric expansion, non-labored breathing  Cardiovascular: regular rate and rhythm, nomal pulses, no peripheral edema  Abdomen: soft, non tender, non distended, no palpable masses, no hernias, no hepatomegaly or splenomegaly  Pelvic: not examined   Lymphatic: no inguinal nodes  Skin: normal coloration and turgor, no rashes, no suspicious skin lesions noted  Neuro: alert and oriented x3, no gross " deficits  Psych: normal judgment and insight, normal mood/affect and non-anxious  No CVA tenderness     Lab Review   Urinalysis demonstrates negative for all components  Lab Results   Component Value Date    WBC 6.70 11/20/2018    HGB 11.2 (L) 11/20/2018    HCT 34.6 (L) 11/20/2018    MCV 86 11/20/2018     11/20/2018     Lab Results   Component Value Date    CREATININE 0.8 11/20/2018    BUN 9 11/20/2018       Imaging   None    Assessment:     1. Dysuria    2.      Urinary urgency     Plan:   Ivy was seen today for bladder pain and other.    Diagnoses and all orders for this visit:    Dysuria  -     Urine culture  -     methen-mBowenblue-s.phos-phsal-hyo (URIBEL) 118-10-40.8-36 mg Cap; Take 1 capsule by mouth 3 (three) times daily as needed.    Urinary urgency    Plan:  --Repeat urine culture, will hold on antibiotics (UA negative)  --Symptoms likely residual from UTI   --Discussed common bladder irritants such as caffeine, alcohol, citrus, and acidic and spicy foods. Encouraged to minimize in diet to reduce symptoms.  --Uribel PRN

## 2018-12-05 ENCOUNTER — TELEPHONE (OUTPATIENT)
Dept: UROLOGY | Facility: CLINIC | Age: 38
End: 2018-12-05

## 2018-12-05 NOTE — TELEPHONE ENCOUNTER
----- Message from Rashid Ruiz LPN sent at 12/4/2018  4:15 PM CST -----  Contact: Cristina   Please advise      Hallie Fraser  ----- Message -----  From: Rosita Linares  Sent: 12/4/2018   4:01 PM  To: Elsa Contreras Staff    Name of Who is Calling: Cristina  What is the request in detail:Jolene Barreto states medication needs a prior Authorization or a prescribe another medication Uribel Please contact to further discuss and advise     Can the clinic reply by [facility]: No    What [facility] Back [facility]: [phone]

## 2018-12-05 NOTE — TELEPHONE ENCOUNTER
TRC I need to discuss plan with patient    Spoke with IR and they need updated CT scan with contrast first and of course there would not be definite resolution if this is ovarian remnant. The radiologist in reviewing past studies felt it was reachable but it would depend on the current size.

## 2018-12-05 NOTE — TELEPHONE ENCOUNTER
VM left for the pt informing her that often insurance will not cover uribel, this is why I provided a coupon in the clinic. Instructed her to take azo if the uribel is too expensive with the coupon.

## 2018-12-05 NOTE — TELEPHONE ENCOUNTER
Patient states having nausea and vomiting and thinks area is bigger. They are still working her up for pheochromocytoma or carcinoid and unsure if area in RLQ might be a focus. Other options would include see if GynOnc could do surgery since would be a difficult surgery and they would have Elisha available, could give Depo Lupron to see if area shrinks but regardless will do CT scan of pelvis today as per IR recommendation.

## 2018-12-05 NOTE — TELEPHONE ENCOUNTER
----- Message from Rosita Newman sent at 12/5/2018 12:01 PM CST -----  Contact: [phone]/ self   Patient called in returning 's call. Pt will be unavailable until 3:30 as she will be in class taking an exam. Please advise.

## 2018-12-06 ENCOUNTER — HOSPITAL ENCOUNTER (OUTPATIENT)
Dept: RADIOLOGY | Facility: HOSPITAL | Age: 38
Discharge: HOME | End: 2018-12-06
Attending: OBSTETRICS & GYNECOLOGY
Payer: MEDICAID

## 2018-12-06 DIAGNOSIS — R19.7 NAUSEA, VOMITING AND DIARRHEA: ICD-10-CM

## 2018-12-06 DIAGNOSIS — R11.2 NAUSEA, VOMITING AND DIARRHEA: ICD-10-CM

## 2018-12-06 PROCEDURE — 25500020 PHARM REV CODE 255: Performed by: OBSTETRICS & GYNECOLOGY

## 2018-12-06 PROCEDURE — 74177 CT ABD & PELVIS W/CONTRAST: CPT | Mod: 26,,, | Performed by: RADIOLOGY

## 2018-12-06 PROCEDURE — 74177 CT ABD & PELVIS W/CONTRAST: CPT | Mod: TC

## 2018-12-06 RX ADMIN — IOHEXOL 75 ML: 350 INJECTION, SOLUTION INTRAVENOUS at 07:12

## 2018-12-07 ENCOUNTER — TELEPHONE (OUTPATIENT)
Dept: UROLOGY | Facility: HOSPITAL | Age: 38
End: 2018-12-07

## 2018-12-07 ENCOUNTER — PATIENT MESSAGE (OUTPATIENT)
Dept: OBSTETRICS AND GYNECOLOGY | Facility: CLINIC | Age: 38
End: 2018-12-07

## 2018-12-07 ENCOUNTER — PATIENT MESSAGE (OUTPATIENT)
Dept: OBSTETRICS AND GYNECOLOGY | Facility: HOSPITAL | Age: 38
End: 2018-12-07

## 2018-12-07 DIAGNOSIS — K50.119 CROHN'S COLITIS, UNSPECIFIED COMPLICATION: ICD-10-CM

## 2018-12-07 DIAGNOSIS — N30.00 ACUTE CYSTITIS WITHOUT HEMATURIA: Primary | ICD-10-CM

## 2018-12-07 DIAGNOSIS — K66.0 ABDOMINAL ADHESIONS: ICD-10-CM

## 2018-12-07 DIAGNOSIS — N99.83 OVARIAN REMNANT SYNDROME: Primary | ICD-10-CM

## 2018-12-07 DIAGNOSIS — B37.31 VAGINAL YEAST INFECTION: ICD-10-CM

## 2018-12-07 LAB — BACTERIA UR CULT: NORMAL

## 2018-12-07 RX ORDER — FLUCONAZOLE 150 MG/1
150 TABLET ORAL DAILY
Qty: 1 TABLET | Refills: 4 | Status: SHIPPED | OUTPATIENT
Start: 2018-12-07 | End: 2018-12-08

## 2018-12-07 RX ORDER — SULFAMETHOXAZOLE AND TRIMETHOPRIM 800; 160 MG/1; MG/1
1 TABLET ORAL 2 TIMES DAILY
Qty: 20 TABLET | Refills: 0 | Status: SHIPPED | OUTPATIENT
Start: 2018-12-07 | End: 2018-12-10

## 2018-12-07 RX ORDER — CIPROFLOXACIN 500 MG/1
500 TABLET ORAL 2 TIMES DAILY
Qty: 20 TABLET | Refills: 0 | Status: SHIPPED | OUTPATIENT
Start: 2018-12-07 | End: 2018-12-07

## 2018-12-08 LAB
CATECHOLS PLAS-MCNC: 1176 PG/ML
DOPAMINE SERPL-MCNC: 33 PG/ML
EPINEPH PLAS-MCNC: 93 PG/ML
NOREPINEPH PLAS-MCNC: 1050 PG/ML

## 2018-12-10 ENCOUNTER — PATIENT MESSAGE (OUTPATIENT)
Dept: GASTROENTEROLOGY | Facility: CLINIC | Age: 38
End: 2018-12-10

## 2018-12-10 ENCOUNTER — PATIENT MESSAGE (OUTPATIENT)
Dept: INTERNAL MEDICINE | Facility: CLINIC | Age: 38
End: 2018-12-10

## 2018-12-10 ENCOUNTER — PATIENT MESSAGE (OUTPATIENT)
Dept: UROLOGY | Facility: CLINIC | Age: 38
End: 2018-12-10

## 2018-12-10 DIAGNOSIS — N30.00 ACUTE CYSTITIS WITHOUT HEMATURIA: Primary | ICD-10-CM

## 2018-12-10 RX ORDER — CEPHALEXIN 500 MG/1
500 CAPSULE ORAL EVERY 12 HOURS
Qty: 14 CAPSULE | Refills: 0 | Status: SHIPPED | OUTPATIENT
Start: 2018-12-10 | End: 2018-12-17

## 2018-12-10 NOTE — TELEPHONE ENCOUNTER
Spoke with the pt. Instructed to d/c bactrim. Bactrim and cipro added to her allergy list. Rx for keflex sent to her pharmacy.

## 2018-12-10 NOTE — TELEPHONE ENCOUNTER
Diane,    I sent note saying I was forwarding to Dr. Obrien, but I think this is a patient you saw.

## 2018-12-11 ENCOUNTER — HOSPITAL ENCOUNTER (EMERGENCY)
Facility: HOSPITAL | Age: 38
Discharge: HOME | End: 2018-12-11
Attending: EMERGENCY MEDICINE
Payer: MEDICAID

## 2018-12-11 ENCOUNTER — PATIENT MESSAGE (OUTPATIENT)
Dept: OBSTETRICS AND GYNECOLOGY | Facility: CLINIC | Age: 38
End: 2018-12-11

## 2018-12-11 VITALS
BODY MASS INDEX: 18.5 KG/M2 | DIASTOLIC BLOOD PRESSURE: 85 MMHG | HEART RATE: 88 BPM | OXYGEN SATURATION: 98 % | WEIGHT: 98 LBS | SYSTOLIC BLOOD PRESSURE: 128 MMHG | HEIGHT: 61 IN | RESPIRATION RATE: 20 BRPM | TEMPERATURE: 99 F

## 2018-12-11 DIAGNOSIS — F41.1 ANXIETY REACTION: Primary | ICD-10-CM

## 2018-12-11 DIAGNOSIS — G44.209 TENSION HEADACHE: ICD-10-CM

## 2018-12-11 DIAGNOSIS — R00.2 PALPITATIONS: ICD-10-CM

## 2018-12-11 DIAGNOSIS — F41.9 ANXIETY: Primary | ICD-10-CM

## 2018-12-11 DIAGNOSIS — R07.9 CHEST PAIN: ICD-10-CM

## 2018-12-11 LAB
ALBUMIN SERPL BCP-MCNC: 3.8 G/DL
ALP SERPL-CCNC: 86 U/L
ALT SERPL W/O P-5'-P-CCNC: 10 U/L
ANION GAP SERPL CALC-SCNC: 11 MMOL/L
AST SERPL-CCNC: 19 U/L
B-HCG UR QL: NEGATIVE
BASOPHILS # BLD AUTO: 0.01 K/UL
BASOPHILS NFR BLD: 0.2 %
BILIRUB SERPL-MCNC: 0.5 MG/DL
BILIRUB UR QL STRIP: NEGATIVE
BNP SERPL-MCNC: 11 PG/ML
BUN SERPL-MCNC: 7 MG/DL
CALCIUM SERPL-MCNC: 9 MG/DL
CHLORIDE SERPL-SCNC: 106 MMOL/L
CLARITY UR: CLEAR
CO2 SERPL-SCNC: 19 MMOL/L
COLOR UR: YELLOW
CREAT SERPL-MCNC: 0.8 MG/DL
CTP QC/QA: YES
DIFFERENTIAL METHOD: ABNORMAL
EOSINOPHIL # BLD AUTO: 0 K/UL
EOSINOPHIL NFR BLD: 0.2 %
ERYTHROCYTE [DISTWIDTH] IN BLOOD BY AUTOMATED COUNT: 14.6 %
EST. GFR  (AFRICAN AMERICAN): >60 ML/MIN/1.73 M^2
EST. GFR  (NON AFRICAN AMERICAN): >60 ML/MIN/1.73 M^2
GLUCOSE SERPL-MCNC: 99 MG/DL
GLUCOSE UR QL STRIP: NEGATIVE
HCT VFR BLD AUTO: 33.5 %
HGB BLD-MCNC: 11.2 G/DL
HGB UR QL STRIP: NEGATIVE
KETONES UR QL STRIP: NEGATIVE
LEUKOCYTE ESTERASE UR QL STRIP: NEGATIVE
LYMPHOCYTES # BLD AUTO: 1.1 K/UL
LYMPHOCYTES NFR BLD: 17.8 %
MAGNESIUM SERPL-MCNC: 1.7 MG/DL
MCH RBC QN AUTO: 28.3 PG
MCHC RBC AUTO-ENTMCNC: 33.4 G/DL
MCV RBC AUTO: 85 FL
MONOCYTES # BLD AUTO: 0.6 K/UL
MONOCYTES NFR BLD: 8.6 %
NEUTROPHILS # BLD AUTO: 4.7 K/UL
NEUTROPHILS NFR BLD: 73 %
NITRITE UR QL STRIP: NEGATIVE
PH UR STRIP: 6 [PH] (ref 5–8)
PHOSPHATE SERPL-MCNC: 3.3 MG/DL
PLATELET # BLD AUTO: 258 K/UL
PMV BLD AUTO: 9.5 FL
POTASSIUM SERPL-SCNC: 4.1 MMOL/L
PROT SERPL-MCNC: 7.2 G/DL
PROT UR QL STRIP: NEGATIVE
RBC # BLD AUTO: 3.96 M/UL
SODIUM SERPL-SCNC: 136 MMOL/L
SP GR UR STRIP: 1.02 (ref 1–1.03)
TROPONIN I SERPL DL<=0.01 NG/ML-MCNC: <0.006 NG/ML
TSH SERPL DL<=0.005 MIU/L-ACNC: 1.53 UIU/ML
URN SPEC COLLECT METH UR: NORMAL
UROBILINOGEN UR STRIP-ACNC: NEGATIVE EU/DL
WBC # BLD AUTO: 6.41 K/UL

## 2018-12-11 PROCEDURE — 93010 ELECTROCARDIOGRAM REPORT: CPT | Mod: ,,, | Performed by: INTERNAL MEDICINE

## 2018-12-11 PROCEDURE — 83735 ASSAY OF MAGNESIUM: CPT

## 2018-12-11 PROCEDURE — 83880 ASSAY OF NATRIURETIC PEPTIDE: CPT

## 2018-12-11 PROCEDURE — 81025 URINE PREGNANCY TEST: CPT | Performed by: PHYSICIAN ASSISTANT

## 2018-12-11 PROCEDURE — 84100 ASSAY OF PHOSPHORUS: CPT

## 2018-12-11 PROCEDURE — 25000003 PHARM REV CODE 250: Performed by: EMERGENCY MEDICINE

## 2018-12-11 PROCEDURE — 84484 ASSAY OF TROPONIN QUANT: CPT

## 2018-12-11 PROCEDURE — 96374 THER/PROPH/DIAG INJ IV PUSH: CPT

## 2018-12-11 PROCEDURE — 85025 COMPLETE CBC W/AUTO DIFF WBC: CPT

## 2018-12-11 PROCEDURE — 63600175 PHARM REV CODE 636 W HCPCS: Performed by: EMERGENCY MEDICINE

## 2018-12-11 PROCEDURE — 99285 EMERGENCY DEPT VISIT HI MDM: CPT | Mod: 25

## 2018-12-11 PROCEDURE — 84443 ASSAY THYROID STIM HORMONE: CPT

## 2018-12-11 PROCEDURE — 80053 COMPREHEN METABOLIC PANEL: CPT

## 2018-12-11 PROCEDURE — 81003 URINALYSIS AUTO W/O SCOPE: CPT

## 2018-12-11 PROCEDURE — 96375 TX/PRO/DX INJ NEW DRUG ADDON: CPT

## 2018-12-11 PROCEDURE — 93005 ELECTROCARDIOGRAM TRACING: CPT

## 2018-12-11 RX ORDER — HEPARIN 100 UNIT/ML
100 SYRINGE INTRAVENOUS
Status: COMPLETED | OUTPATIENT
Start: 2018-12-11 | End: 2018-12-11

## 2018-12-11 RX ORDER — KETOROLAC TROMETHAMINE 30 MG/ML
15 INJECTION, SOLUTION INTRAMUSCULAR; INTRAVENOUS
Status: COMPLETED | OUTPATIENT
Start: 2018-12-11 | End: 2018-12-11

## 2018-12-11 RX ORDER — ACETAMINOPHEN 325 MG/1
325 TABLET ORAL
Status: COMPLETED | OUTPATIENT
Start: 2018-12-11 | End: 2018-12-11

## 2018-12-11 RX ORDER — BUTALBITAL, ACETAMINOPHEN AND CAFFEINE 50; 325; 40 MG/1; MG/1; MG/1
1 TABLET ORAL EVERY 4 HOURS PRN
Qty: 15 TABLET | Refills: 0 | Status: ON HOLD | OUTPATIENT
Start: 2018-12-11 | End: 2019-01-16 | Stop reason: HOSPADM

## 2018-12-11 RX ORDER — BUTALBITAL, ACETAMINOPHEN AND CAFFEINE 50; 325; 40 MG/1; MG/1; MG/1
1 TABLET ORAL
Status: COMPLETED | OUTPATIENT
Start: 2018-12-11 | End: 2018-12-11

## 2018-12-11 RX ORDER — ALPRAZOLAM 1 MG/1
1 TABLET ORAL 2 TIMES DAILY
Qty: 60 TABLET | Refills: 0 | Status: ON HOLD | OUTPATIENT
Start: 2018-12-11 | End: 2019-01-16 | Stop reason: HOSPADM

## 2018-12-11 RX ADMIN — LORAZEPAM 1 MG: 2 INJECTION INTRAMUSCULAR; INTRAVENOUS at 12:12

## 2018-12-11 RX ADMIN — BUTALBITAL, ACETAMINOPHEN, AND CAFFEINE 1 TABLET: 50; 325; 40 TABLET ORAL at 01:12

## 2018-12-11 RX ADMIN — HEPARIN 100 UNITS: 100 SYRINGE at 03:12

## 2018-12-11 RX ADMIN — KETOROLAC TROMETHAMINE 15 MG: 30 INJECTION, SOLUTION INTRAMUSCULAR at 01:12

## 2018-12-11 RX ADMIN — HEPARIN 100 UNITS: 100 SYRINGE at 02:12

## 2018-12-11 RX ADMIN — ACETAMINOPHEN 325 MG: 325 TABLET ORAL at 01:12

## 2018-12-11 NOTE — ED PROVIDER NOTES
Encounter Date: 12/11/2018    SCRIBE #1 NOTE: I, Matt Floyd, am scribing for, and in the presence of,  . I have scribed the entire note.       History     Chief Complaint   Patient presents with    Palpitations     palpitations, flushed, and chest pain that strtaed a few days.  Patient is supposed to see endocronology, but had an episode this morning PTA.     Time seen by provider: 11:51 AM    This is a 38 y.o. female who presents with complaints of chest pain, shaking, flushed appearance, chest palpitations. Patient was set to see endocrinology but had an episode PTA for the ED. The episode began at 1000 this morning, she is experiencing diaphoresis, chest pain, shaking, heat intolerance and heart fluttering. She also has complaints of tingling her in fingers, but is not experiencing it right now. Patient also notes unexpected weight loss.  Additional medical history includes an ileostomy with everyday usage of Imodium and Lomotil, as well as a removed ovary. Patient is anxious and tearful at this time.        The history is provided by the patient and a friend.     Review of patient's allergies indicates:   Allergen Reactions    Azathioprine sodium Other (See Comments)     Other reaction(s): pancreatitis  Other reaction(s): pancreatitis    Methotrexate analogues Other (See Comments)     leukopenia    Stelara [ustekinumab] Other (See Comments)     Multiple infections    Zofran [ondansetron hcl (pf)] Other (See Comments)     Per patient causes prolong QT    Vancomycin analogues Hives    Morphine Itching and Other (See Comments)     Other reaction(s): Itching    Bactrim [sulfamethoxazole-trimethoprim] Palpitations    Ciprofloxacin Palpitations     Past Medical History:   Diagnosis Date    Abnormal Pap smear 2007    Abnormal Pap smear 5/26/2011    Anemia     Anxiety     Arthritis     C. difficile diarrhea     Crohn's disease     Depression 8/5/2017    Encounter for blood transfusion      Genital HSV     History of colposcopy with cervical biopsy 2007 and 7/2011 2007-LYLA I  and 7/2011- LYLA I    Hypertension     Kidney stone     Kidney stone     Recurrent UTI 4/3/2013    S/P ileostomy 7/9/2012    Sterilization 6/23/2012     Past Surgical History:   Procedure Laterality Date    ABDOMINAL SURGERY      APPENDECTOMY      BLADDER SURGERY      partial cystectomy due to fistula    CKC      COLON SURGERY      COLONOSCOPY      EGD (ESOPHAGOGASTRODUODENOSCOPY) N/A 9/6/2013    Performed by [facility]ilio [facility] MD at [facility] ENDO (4TH FLR)    ESOPHAGOGASTRODUODENOSCOPY (EGD) N/A 10/14/2016    Performed by Janelle Mcpherson MD at [facility] ENDO (2ND FLR)    ESOPHAGOGASTRODUODENOSCOPY (EGD) N/A 10/23/2014    Performed by Nathanael Mitchell MD at [facility] ENDO (2ND FLR)    EXAM UNDER ANESTHESIA N/A 8/29/2013    Performed by Bob Parsons MD at [facility] OR 2ND FLR    EXAM UNDER ANESTHESIA N/A 1/18/2013    Performed by Bob [facility]ons MD at [facility] OR 2ND FLR    HYSTERECTOMY-ABDOMINAL-TOTAL (SHELLIE) N/A 4/16/2015    Performed by [facility]ix [facility] at [facility] OR    ILEOSCOPY N/A 8/8/2017    Performed by Tommie Klein MD at [facility] ENDO (2ND FLR)    ILEOSCOPY N/A 10/14/2016    Performed by Janelle Mcpherson MD at [facility] ENDO (2ND FLR)    ILEOSCOPY N/A 9/25/2013    Performed by Emilio Cameron MD at [facility] ENDO (4TH FLR)    ILEOSTOMY      INCISION AND DRAINAGE, ABSCESS N/A 8/29/2013    Performed by Bob Parsons MD at [facility] OR 2ND FLR    YRPKGCGXE-RCEA-E-CATH Left 2/21/2017    Performed by Parish Sanchez Jr., MD at [facility] OR 2ND FLR    OOPHORECTOMY Right 04/16/2015    PORTACATH PLACEMENT  02/21/2017    REPAIR-BLADDER  4/16/2015    [facility]formed by [facility] at [facility] OR    SALPINGO-OOPHERECTOMY Right 4/16/2015    Performed by [facility] MD at [facility] OR    SIGMOIDOSCOPY, FLEXIBLE N/A 2/7/2014    Performed by [facility] at [facility] (2[facility] F[facility])    SKIN BIOPSY      SMALL INTESTINE SURGERY       TOTAL ABDOMINAL HYSTERECTOMY  04/16/2015    TOTAL COLECTOMY      TUBAL LIGATION  06/06/2012    UPPER GASTROINTESTINAL ENDOSCOPY       Family History   Problem Relation Age of Onset    Colon cancer Father     Cancer Father         colon cancer    Hypertension Mother     Cancer Maternal Grandfather         esopghal     Skin cancer Maternal Grandfather     Endometrial cancer Maternal Aunt     Crohn's disease Brother     Breast cancer Neg Hx     Ovarian cancer Neg Hx      Social History     Tobacco Use    Smoking status: Never Smoker    Smokeless tobacco: Never Used   Substance Use Topics    Alcohol use: Yes     Alcohol/week: 0.0 oz     Comment: Patient only drinks wine not regular basis.    Drug use: No     Review of Systems   Constitutional: Positive for diaphoresis and unexpected weight change. Negative for chills and fever.        Flushing  Shaking   HENT: Negative for facial swelling and trouble swallowing.    Eyes: Negative for redness.   Respiratory: Negative for shortness of breath.         Chest palpitations  Fluttering   Cardiovascular: Positive for chest pain.   Gastrointestinal: Negative for diarrhea and vomiting.   Endocrine: Positive for heat intolerance.   Genitourinary: Negative for dysuria and hematuria.   Musculoskeletal: Negative for gait problem.   Skin: Negative for rash.   Neurological: Negative for facial asymmetry and speech difficulty.   Psychiatric/Behavioral: The patient is nervous/anxious.        Physical Exam     Initial Vitals [12/11/18 1146]   BP Pulse Resp Temp SpO2   (!) 145/84 (!) 126 18 99.8 °F (37.7 °C) 100 %      MAP       --         Physical Exam    Nursing note and vitals reviewed.  Constitutional: She appears well-developed and well-nourished. She is diaphoretic. No distress.   Anxious and tearful   HENT:   Head: Normocephalic and atraumatic.   Eyes: Conjunctivae and EOM are normal.   Neck: Normal range of motion. Neck supple.   Thyroids Normal    Cardiovascular: Regular rhythm and normal heart sounds. Exam reveals no gallop.    No murmur heard.  Tachycardic   Pulmonary/Chest: Breath sounds normal. No respiratory distress.   Abdominal: Soft. Bowel sounds are normal. There is no tenderness. There is no rebound and no guarding.   Musculoskeletal: Normal range of motion. She exhibits no edema or tenderness.   Neurological: She is alert and oriented to person, place, and time.   Skin: Skin is warm. Capillary refill takes less than 2 seconds.         ED Course   Procedures  Labs Reviewed   URINALYSIS, REFLEX TO URINE CULTURE   TSH   CBC W/ AUTO DIFFERENTIAL   COMPREHENSIVE METABOLIC PANEL   TROPONIN I   B-TYPE NATRIURETIC PEPTIDE   POCT URINE PREGNANCY     EKG Readings: (Independently Interpreted)   Rhythm: Sinus Tachycardia. Heart Rate: 117. Ectopy: No Ectopy. Conduction: Normal. ST Segments: Normal ST Segments. T Waves: Normal. Axis: Normal. Clinical Impression: Sinus Tachycardia       Imaging Results    None          Medical Decision Making:   ED Management:  38-year-old female presents with palpitations, chest pain and shortness of breath.  She has had multiple visits for identical symptoms.  Holter monitor has been performed with symptoms present without corresponding arrhythmia.  She is tearful and appears anxious with acral and perioral paresthesias consistent with a panic disorder.  She has symptomatic improvement with benzodiazepines consistent with same.  TSH is normal with no evidence of hyperthyroidism.  Reviewing previous testing for pheochromocytoma have been normal as well.                   ED Course as of Dec 11 1151   Tue Dec 11, 2018   1147 [facility] note: Ivy Salgado nontoxic/afebrile 38 y.o.  presented to the ED with c/o possible CP and palpitations.  Possible pheochromocytoma diagnosis, elevated catecholamines tested in Dec.  Awaiting a Neuroendocrine appointment.       Patient seen and medically screened by Physician assistant in [facility]  process due to ED crowding.  Appropriate tests and/or medications ordered.  Care transferred to an alternate provider when patient was placed in an Exam Room from the lobby for physical exam, additional orders, and disposition. M    [AM]      ED Course User Index  [AM] Patti Newton PA-C     Clinical Impression:     1. Palpitations    2. Chest pain             I, Dr. Lazaro Cardenas III, personally performed the services described in this documentation. All medical record entries made by the scribe were at my direction and in my presence.  I have reviewed the chart and agree that the record reflects my personal performance and is accurate and complete Scribe Attestation                    Lazaro Cardenas III, MD  12/11/18 0124

## 2018-12-11 NOTE — ED NOTES
38 year old female presents to ed cc of palpitations. Patient describes palpitations as flushed feeling  Patient denies chest pain/sob patient awake alert ox4 able to identify name and  on armband. Patient placed on cardiac monitor bp cuff and pulse ox. Patient updated on plan of care no questions at this time nurse will continue to monitor

## 2018-12-11 NOTE — ED NOTES
APPEARANCE: Alert, oriented and in no acute distress.  PERIPHERAL VASCULAR: peripheral pulses present. Normal cap refill. No edema. Warm to touch.    RESPIRATORY:Normal rate and effort, breath sounds clear bilaterally throughout chest. Respirations are equal and unlabored no obvious signs of distress.  GASTRO: soft, bowel sounds normal, no tenderness, no abdominal distention.  MUSC: Full ROM. No bony tenderness or soft tissue tenderness. No obvious deformity.  SKIN: Skin is warm and dry, normal skin turgor, mucous membranes moist.  NEURO: 5/5 strength major flexors/extensors bilaterally. Sensory intact to light touch bilaterally. Nilo coma scale: eyes open spontaneously-4, oriented & converses-5, obeys commands-6. No neurological abnormalities.   MENTAL STATUS: awake, alert and aware of environment.  EYE: PERRL, both eyes: pupils brisk and reactive to light. Normal size.  ENT: EARS: no obvious drainage. NOSE: no active bleeding.

## 2018-12-14 DIAGNOSIS — R00.2 PALPITATIONS: Primary | ICD-10-CM

## 2018-12-17 ENCOUNTER — PATIENT MESSAGE (OUTPATIENT)
Dept: UROLOGY | Facility: CLINIC | Age: 38
End: 2018-12-17

## 2018-12-17 DIAGNOSIS — R39.9 UTI SYMPTOMS: Primary | ICD-10-CM

## 2018-12-19 ENCOUNTER — PATIENT MESSAGE (OUTPATIENT)
Dept: GASTROENTEROLOGY | Facility: CLINIC | Age: 38
End: 2018-12-19

## 2018-12-19 ENCOUNTER — PATIENT MESSAGE (OUTPATIENT)
Dept: UROLOGY | Facility: CLINIC | Age: 38
End: 2018-12-19

## 2018-12-19 ENCOUNTER — TELEPHONE (OUTPATIENT)
Dept: GASTROENTEROLOGY | Facility: CLINIC | Age: 38
End: 2018-12-19

## 2018-12-19 NOTE — TELEPHONE ENCOUNTER
----- Message from Kamryn Dane sent at 12/19/2018  3:57 PM CST -----  Contact: Self- [phone]  Cody- pt returning a missed call to discuss f/u appt- please contact pt at [phone]

## 2018-12-20 ENCOUNTER — INFUSION (OUTPATIENT)
Dept: INFUSION THERAPY | Facility: OTHER | Age: 38
End: 2018-12-20
Attending: INTERNAL MEDICINE
Payer: MEDICAID

## 2018-12-20 VITALS
SYSTOLIC BLOOD PRESSURE: 127 MMHG | RESPIRATION RATE: 18 BRPM | DIASTOLIC BLOOD PRESSURE: 78 MMHG | OXYGEN SATURATION: 100 % | TEMPERATURE: 99 F | HEART RATE: 76 BPM

## 2018-12-20 DIAGNOSIS — K50.019 CROHN'S DISEASE OF SMALL INTESTINE WITH COMPLICATION: Primary | ICD-10-CM

## 2018-12-20 DIAGNOSIS — N39.0 URINARY TRACT INFECTION WITHOUT HEMATURIA, SITE UNSPECIFIED: Primary | ICD-10-CM

## 2018-12-20 PROCEDURE — 96361 HYDRATE IV INFUSION ADD-ON: CPT

## 2018-12-20 PROCEDURE — 96360 HYDRATION IV INFUSION INIT: CPT

## 2018-12-20 PROCEDURE — 25000003 PHARM REV CODE 250: Performed by: INTERNAL MEDICINE

## 2018-12-20 PROCEDURE — 63600175 PHARM REV CODE 636 W HCPCS: Performed by: INTERNAL MEDICINE

## 2018-12-20 RX ORDER — HEPARIN 100 UNIT/ML
500 SYRINGE INTRAVENOUS
Status: COMPLETED | OUTPATIENT
Start: 2018-12-20 | End: 2018-12-20

## 2018-12-20 RX ADMIN — HEPARIN 500 UNITS: 100 SYRINGE at 12:12

## 2018-12-20 RX ADMIN — SODIUM CHLORIDE: 0.9 INJECTION, SOLUTION INTRAVENOUS at 10:12

## 2018-12-20 RX ADMIN — SODIUM CHLORIDE: 0.9 INJECTION, SOLUTION INTRAVENOUS at 09:12

## 2018-12-20 NOTE — PLAN OF CARE
Problem: Adult Inpatient Plan of Care  Goal: Plan of Care Review  Outcome: Ongoing (interventions implemented as appropriate)  Pt tolerated IVF without issue. VSS. AVS given.

## 2018-12-24 ENCOUNTER — OFFICE VISIT (OUTPATIENT)
Dept: GASTROENTEROLOGY | Facility: CLINIC | Age: 38
End: 2018-12-24
Payer: MEDICAID

## 2018-12-24 ENCOUNTER — HOSPITAL ENCOUNTER (EMERGENCY)
Facility: HOSPITAL | Age: 38
Discharge: HOME | End: 2018-12-24
Attending: EMERGENCY MEDICINE
Payer: MEDICAID

## 2018-12-24 VITALS
HEIGHT: 61 IN | OXYGEN SATURATION: 98 % | RESPIRATION RATE: 16 BRPM | HEART RATE: 86 BPM | DIASTOLIC BLOOD PRESSURE: 79 MMHG | BODY MASS INDEX: 18.26 KG/M2 | SYSTOLIC BLOOD PRESSURE: 119 MMHG | WEIGHT: 96.69 LBS | TEMPERATURE: 99 F

## 2018-12-24 VITALS
HEIGHT: 61 IN | SYSTOLIC BLOOD PRESSURE: 128 MMHG | DIASTOLIC BLOOD PRESSURE: 89 MMHG | BODY MASS INDEX: 18.28 KG/M2 | WEIGHT: 96.81 LBS | HEART RATE: 95 BPM

## 2018-12-24 DIAGNOSIS — R19.7 DIARRHEA, UNSPECIFIED TYPE: ICD-10-CM

## 2018-12-24 DIAGNOSIS — R10.9 ABDOMINAL PAIN, UNSPECIFIED ABDOMINAL LOCATION: Primary | ICD-10-CM

## 2018-12-24 DIAGNOSIS — K50.819 CROHN'S DISEASE OF SMALL AND LARGE INTESTINES WITH COMPLICATION: ICD-10-CM

## 2018-12-24 DIAGNOSIS — K50.019 CROHN'S DISEASE OF SMALL INTESTINE WITH COMPLICATION: Primary | ICD-10-CM

## 2018-12-24 LAB
ALBUMIN SERPL BCP-MCNC: 4 G/DL
ALP SERPL-CCNC: 109 U/L
ALT SERPL W/O P-5'-P-CCNC: 22 U/L
ANION GAP SERPL CALC-SCNC: 10 MMOL/L
AST SERPL-CCNC: 30 U/L
BASOPHILS # BLD AUTO: 0.02 K/UL
BASOPHILS NFR BLD: 0.3 %
BILIRUB SERPL-MCNC: 0.3 MG/DL
BUN SERPL-MCNC: 9 MG/DL
CALCIUM SERPL-MCNC: 9.5 MG/DL
CHLORIDE SERPL-SCNC: 105 MMOL/L
CO2 SERPL-SCNC: 24 MMOL/L
CREAT SERPL-MCNC: 0.7 MG/DL
DIFFERENTIAL METHOD: ABNORMAL
EOSINOPHIL # BLD AUTO: 0 K/UL
EOSINOPHIL NFR BLD: 0.5 %
ERYTHROCYTE [DISTWIDTH] IN BLOOD BY AUTOMATED COUNT: 15.2 %
EST. GFR  (AFRICAN AMERICAN): >60 ML/MIN/1.73 M^2
EST. GFR  (NON AFRICAN AMERICAN): >60 ML/MIN/1.73 M^2
GLUCOSE SERPL-MCNC: 98 MG/DL
HCT VFR BLD AUTO: 36.7 %
HGB BLD-MCNC: 11.9 G/DL
IMM GRANULOCYTES # BLD AUTO: 0.02 K/UL
IMM GRANULOCYTES NFR BLD AUTO: 0.3 %
LYMPHOCYTES # BLD AUTO: 1.8 K/UL
LYMPHOCYTES NFR BLD: 29.8 %
MCH RBC QN AUTO: 27.3 PG
MCHC RBC AUTO-ENTMCNC: 32.4 G/DL
MCV RBC AUTO: 84 FL
MONOCYTES # BLD AUTO: 0.5 K/UL
MONOCYTES NFR BLD: 8.2 %
NEUTROPHILS # BLD AUTO: 3.7 K/UL
NEUTROPHILS NFR BLD: 60.9 %
NRBC BLD-RTO: 0 /100 WBC
PLATELET # BLD AUTO: 308 K/UL
PMV BLD AUTO: 9.9 FL
POTASSIUM SERPL-SCNC: 3.8 MMOL/L
PROT SERPL-MCNC: 8 G/DL
RBC # BLD AUTO: 4.36 M/UL
SODIUM SERPL-SCNC: 139 MMOL/L
WBC # BLD AUTO: 6.07 K/UL

## 2018-12-24 PROCEDURE — 99284 PR EMERGENCY DEPT VISIT,LEVEL IV: ICD-10-PCS | Mod: ,,, | Performed by: PHYSICIAN ASSISTANT

## 2018-12-24 PROCEDURE — 99284 EMERGENCY DEPT VISIT MOD MDM: CPT | Mod: ,,, | Performed by: PHYSICIAN ASSISTANT

## 2018-12-24 PROCEDURE — 99213 OFFICE O/P EST LOW 20 MIN: CPT | Mod: PBBFAC,25 | Performed by: INTERNAL MEDICINE

## 2018-12-24 PROCEDURE — 99284 EMERGENCY DEPT VISIT MOD MDM: CPT | Mod: 25,27

## 2018-12-24 PROCEDURE — 96360 HYDRATION IV INFUSION INIT: CPT

## 2018-12-24 PROCEDURE — 25000003 PHARM REV CODE 250: Performed by: PHYSICIAN ASSISTANT

## 2018-12-24 PROCEDURE — 63600175 PHARM REV CODE 636 W HCPCS: Performed by: EMERGENCY MEDICINE

## 2018-12-24 PROCEDURE — 99999 PR PBB SHADOW E&M-EST. PATIENT-LVL III: CPT | Mod: PBBFAC,,, | Performed by: INTERNAL MEDICINE

## 2018-12-24 PROCEDURE — 85025 COMPLETE CBC W/AUTO DIFF WBC: CPT

## 2018-12-24 PROCEDURE — 96361 HYDRATE IV INFUSION ADD-ON: CPT

## 2018-12-24 PROCEDURE — 99214 OFFICE O/P EST MOD 30 MIN: CPT | Mod: S$PBB,,, | Performed by: INTERNAL MEDICINE

## 2018-12-24 PROCEDURE — 80053 COMPREHEN METABOLIC PANEL: CPT

## 2018-12-24 RX ORDER — METRONIDAZOLE 500 MG/1
500 TABLET ORAL EVERY 8 HOURS
Qty: 42 TABLET | Refills: 1 | Status: SHIPPED | OUTPATIENT
Start: 2018-12-24 | End: 2019-01-07

## 2018-12-24 RX ORDER — DIPHENOXYLATE HYDROCHLORIDE AND ATROPINE SULFATE 2.5; .025 MG/1; MG/1
1 TABLET ORAL 4 TIMES DAILY PRN
Qty: 100 TABLET | Refills: 0 | Status: SHIPPED | OUTPATIENT
Start: 2018-12-24 | End: 2019-01-23 | Stop reason: SDUPTHER

## 2018-12-24 RX ORDER — OXYCODONE AND ACETAMINOPHEN 10; 325 MG/1; MG/1
1 TABLET ORAL EVERY 6 HOURS PRN
Qty: 40 TABLET | Refills: 0 | Status: SHIPPED | OUTPATIENT
Start: 2018-12-24 | End: 2019-01-23 | Stop reason: SDUPTHER

## 2018-12-24 RX ORDER — HEPARIN 100 UNIT/ML
5 SYRINGE INTRAVENOUS EVERY 8 HOURS PRN
Status: DISCONTINUED | OUTPATIENT
Start: 2018-12-24 | End: 2018-12-24 | Stop reason: HOSPADM

## 2018-12-24 RX ADMIN — SODIUM CHLORIDE 1000 ML: 0.9 INJECTION, SOLUTION INTRAVENOUS at 02:12

## 2018-12-24 RX ADMIN — HEPARIN 500 UNITS: 100 SYRINGE at 03:12

## 2018-12-24 RX ADMIN — SODIUM CHLORIDE 1000 ML: 0.9 INJECTION, SOLUTION INTRAVENOUS at 12:12

## 2018-12-24 NOTE — ED TRIAGE NOTES
Presents to ER for the purpose of receiving IV fluids before her trip to [place] tomorrow.  States that her ostomy is putting out more that usual.  Patient's name and date of birth checked and is correct.    LOC: The patient is awake, alert, and oriented to place, time, situation. Affect is appropriate.  Speech is appropriate and clear.      APPEARANCE: Patient resting comfortably, reporting palpation, light headedness,  in no acute distress.  Patient is clean and well groomed.     SKIN: The skin is warm and dry; color consistent with ethnicity.  Patient has normal skin turgor and moist mucus membranes.  Skin intact; no breakdown or bruising noted.      MUSCULOSKELETAL: Patient moving upper and lower extremities without difficulty.  Denies weakness.      RESPIRATORY: Airway is open and patent. Respirations spontaneous, even, easy, and non-labored.  Patient has a normal effort and rate.  No accessory muscle use noted. Denies cough.  BS clear.     CARDIAC:  No peripheral edema noted. No complaints of chest pain.       ABDOMEN: Soft and non tender to palpation.  No distention noted.      NEUROLOGIC: Eyes open spontaneously.  Behavior appropriate to situation.  Follows commands; facial expression symmetrical.  Purposeful motor response noted; normal sensation in all extremities.

## 2018-12-24 NOTE — PROGRESS NOTES
Subjective:       Patient ID: Ivy Salgado is a 38 y.o. female.    Chief Complaint: Crohn's Disease    HPI  Review of Systems    Objective:      Physical Exam   Cardiovascular: Normal rate and regular rhythm.   Pulmonary/Chest: Effort normal and breath sounds normal.   Abdominal: Soft. Bowel sounds are normal. She exhibits no distension.       Assessment:       1. Crohn's disease of small intestine with complication    2. Crohn's disease of small and large intestines with complication        Plan:         Ivy was seen as an emergency add-on today.  She is a lady with Crohn disease.    She has recently had an increase in diarrhea associated with foul smelling   stools.  She has been on three different courses of antibiotics recently   including Augmentin, Bactrim and Keflex.  There is reminiscent of her prior   episodes of C. difficile.  She also reports generalized abdominal pain than she   has had in the past.  She is concerned about dehydration.  There is no fever.    No other changes in complaints.    ASSESSMENT AND DECISION MAKIN.  Crohn disease.  Right now, we are treating that with Entyvio.  She continues   with abdominal pain.  I will refill the pain medicine today.  We reviewed the   opiate risk factor.  I queried the .  She signed a pain contract.  She scored   low on the opiate risk factor, but because of the ongoing use of antianxiety   medicines, I am going to consider whether or not I should refer her to Dr. Romero in Psychiatry to possibly help with her overall care.  2.  Possible C. difficile.  We will check a C. difficile, give her prescription   for Flagyl, encouraged her to stay hydrated, and I gave her prescription for   Flagyl, just to have in case.  She did have a reaction of hives to vancomycin.    In a 30-minute appointment greater than half time was spent in face-to-face   counseling.      FAUSTO  dd: 2018 11:14:35 (CST)  td: 2018 00:45:48 (CST)  Doc ID    #[id number]  Job ID #[id number]    CC:

## 2018-12-24 NOTE — ED PROVIDER NOTES
Encounter Date: 12/24/2018       History     Chief Complaint   Patient presents with    Dehydration     38-year-old white female with history of Crohn's disease s/p ileostomy, HTN presents to the ED complaining of dehydration.  She normally receives IV fluids weekly for chronic dehydration secondary to diarrhea.  She had IV fluids Thursday and states that she needs them again.  She was seen in the GI clinic earlier today, Dr. Ross, and was instructed to present to the ED for IV fluids since the infusion center was closed.  She does report some increased output from her ostomy.  She recently completed Augmentin and Keflex for UTI.  She endorses abdominal pain, chills. She denies chest pain, shortness breath, melena.      The history is provided by the patient.     Review of patient's allergies indicates:   Allergen Reactions    Azathioprine sodium Other (See Comments)     Other reaction(s): pancreatitis  Other reaction(s): pancreatitis    Methotrexate analogues Other (See Comments)     leukopenia    Stelara [ustekinumab] Other (See Comments)     Multiple infections    Zofran [ondansetron hcl (pf)] Other (See Comments)     Per patient causes prolong QT    Vancomycin analogues Hives    Morphine Itching and Other (See Comments)     Other reaction(s): Itching    Bactrim [sulfamethoxazole-trimethoprim] Palpitations    Ciprofloxacin Palpitations     Past Medical History:   Diagnosis Date    Abnormal Pap smear 2007    Abnormal Pap smear 5/26/2011    Anemia     Anxiety     Arthritis     C. difficile diarrhea     Crohn's disease     Depression 8/5/2017    Encounter for blood transfusion     Genital HSV     History of colposcopy with cervical biopsy 2007 and 7/2011 2007-LYLA I  and 7/2011- LYLA I    Hypertension     Kidney stone     Kidney stone     Recurrent UTI 4/3/2013    S/P ileostomy 7/9/2012    Sterilization 6/23/2012     Past Surgical History:   Procedure Laterality Date    ABDOMINAL SURGERY       APPENDECTOMY      BLADDER SURGERY      partial cystectomy due to fistula    CKC      COLON SURGERY      COLONOSCOPY      EGD (ESOPHAGOGASTRODUODENOSCOPY) N/A 9/6/2013    [facility]formed by [facility] at [facility] ENDO (4TH FLR)    ESOPHAGOGASTRODUODENOSCOPY (EGD) N/A 10/14/2016    Performed by Janelle Mcpherson MD at [facility] ENDO (2ND FLR)    ESOPHAGOGASTRODUODENOSCOPY (EGD) N/A 10/23/2014    Performed by Nathanael Mitchell MD at [facility] ENDO (2ND FLR)    EXAM UNDER ANESTHESIA N/A 8/29/2013    Performed by Bob Parsons MD at [facility] OR 2ND FLR    EXAM UNDER ANESTHESIA N/A 1/18/2013    Performed by Bob Parsons MD at [facility] OR 2ND FLR    HYSTERECTOMY-ABDOMINAL-TOTAL (SHELLIE) N/A 4/16/2015    [facility] OR    ILEOSCOPY N/A 8/8/2017    Performed by Tommie Klein MD at [facility] ENDO (2ND FLR)    ILEOSCOPY N/A 10/14/2016    Performed by Janelle Mcpherson MD at [facility] ENDO (2ND FLR)    ILEOSCOPY N/A 9/25/2013    Performed by Emilio Cameron MD at [facility] ENDO (4TH FLR)    ILEOSTOMY      INCISION AND DRAINAGE, ABSCESS N/A 8/29/2013    Performed by Bob Parsons MD at [facility] OR 2ND FLR    CIAQMYIPR-BLRF-E-CATH Left 2/21/2017    Performed by Parish Sanchez Jr., MD at [facility] OR 2ND FLR    OOPHORECTOMY Right 04/16/2015    PORTACATH PLACEMENT  02/21/2017    REPAIR-BLADDER  4/16/2015    [facility] at [facility] OR    SALPINGO-OOPHERECTOMY Right 4/16/2015    [facility] by [facility] at [facility] OR    SIGMOIDOSCOPY, FLEXIBLE N/A 2/7/2014    Performed by Erasto Sewell MD at [facility] ENDO (2ND FLR)    SKIN BIOPSY      SMALL INTESTINE SURGERY      TOTAL ABDOMINAL HYSTERECTOMY  04/16/2015    TOTAL COLECTOMY      TUBAL LIGATION  06/06/2012    UPPER GASTROINTESTINAL ENDOSCOPY       Family History   Problem Relation Age of Onset    Colon cancer Father     Cancer Father         colon cancer    Hypertension Mother     Cancer Maternal Grandfather          esopghal     Skin cancer Maternal Grandfather     Endometrial cancer Maternal Aunt     Crohn's disease Brother     Breast cancer Neg Hx     Ovarian cancer Neg Hx      Social History     Tobacco Use    Smoking status: Never Smoker    Smokeless tobacco: Never Used   Substance Use Topics    Alcohol use: Yes     Alcohol/week: 0.0 oz     Comment: Patient only drinks wine not regular basis.    Drug use: No     Review of Systems   Constitutional: Positive for chills. Negative for fever.   HENT: Negative for congestion, rhinorrhea and sore throat.    Eyes: Negative for photophobia and visual disturbance.   Respiratory: Negative for shortness of breath.    Cardiovascular: Negative for chest pain.   Gastrointestinal: Positive for abdominal pain and diarrhea. Negative for constipation, nausea and vomiting.   Genitourinary: Negative for dysuria and hematuria.   Musculoskeletal: Negative for back pain, neck pain and neck stiffness.   Skin: Negative for rash and wound.   Neurological: Positive for light-headedness. Negative for syncope, weakness, numbness and headaches.   Psychiatric/Behavioral: Negative for confusion.       Physical Exam     Initial Vitals [12/24/18 1145]   BP Pulse Resp Temp SpO2   129/84 96 18 98.6 °F (37 °C) 98 %      MAP       --         Physical Exam    Nursing note and vitals reviewed.  Constitutional: She appears well-developed and well-nourished. She is not diaphoretic. No distress.   HENT:   Head: Normocephalic and atraumatic.   Neck: Normal range of motion. Neck supple.   Cardiovascular: Normal rate, regular rhythm and normal heart sounds.   Pulmonary/Chest: Breath sounds normal. She has no wheezes. She has no rhonchi. She has no rales.   Abdominal: Soft. Bowel sounds are normal. There is no tenderness. There is no rebound and no guarding.   Ostomy noted in L lower abdomen   Musculoskeletal: Normal range of motion.   Neurological: She is alert and oriented to person, place, and time.   Skin:  Skin is warm and dry. No rash noted. No erythema.   Psychiatric: She has a normal mood and affect.         ED Course   Procedures  Labs Reviewed   CBC W/ AUTO DIFFERENTIAL - Abnormal; Notable for the following components:       Result Value    Hemoglobin 11.9 (*)     Hematocrit 36.7 (*)     RDW 15.2 (*)     All other components within normal limits   COMPREHENSIVE METABOLIC PANEL          Imaging Results    None                APC / Resident Notes:   38-year-old white female with history of Crohn's disease s/p ileostomy, HTN presents to the ED complaining of dehydration.  Vital signs stable. Regular rate and rhythm. Lungs are clear.  Abdomen is soft and nontender.  Ostomy noted in the L lower abdomen. DDx includes but is not limited to dehydration, electrolyte abnormality, infection. Will check labs, give IVF.    No leukocytosis. CMP normal.    Given 2L IVF in the ED. She reports that her symptoms have improved. Stable for discharge.    She was discharged without any new prescriptions.  She will follow up with her PCP and GI.  All of the patient's questions were answered.  I reviewed the patient's chart and labs and discussed the case with my supervising physician.                    Clinical Impression:   The primary encounter diagnosis was Abdominal pain, unspecified abdominal location. A diagnosis of Diarrhea, unspecified type was also pertinent to this visit.      Disposition:   Disposition: Discharged  Condition: Stable                        Shantel Winston PA-C  12/24/18 7906

## 2018-12-24 NOTE — ED NOTES
Pt resting comfortably in no acute distress. No complaints at this time. Call bell within reach. Will continue to monitor.

## 2018-12-24 NOTE — ED NOTES
"Power port left subclavian accessed with a 20ga 1" montes needle.  [facility] policy for insertion followed and patient tolerated well.  "

## 2018-12-24 NOTE — ED NOTES
Port access removed. Pt given discharge instructions. Verbalized understanding. No acute distress at this time. No complaints or questions at this time. Pt ambulatory with steady gate.

## 2018-12-31 DIAGNOSIS — F41.9 ANXIETY: ICD-10-CM

## 2018-12-31 RX ORDER — ZOLPIDEM TARTRATE 10 MG/1
TABLET ORAL
Qty: 30 TABLET | Refills: 0 | Status: SHIPPED | OUTPATIENT
Start: 2018-12-31 | End: 2019-02-01 | Stop reason: SDUPTHER

## 2019-01-01 NOTE — TELEPHONE ENCOUNTER
----- Message from Alexandra Herrera sent at 8/31/2018 11:56 AM CDT -----            Name of Who is Calling  [facility]  Jimena is the request in detail: they are calling to schedule a hospital f/u appt possibly on the 9/13/18 at 8  Pt has medicaid and it will not let us schedule          What Number to Call Back if not in [facility]:[phone]  Pt number                                   Spine appears normal, movement of extremities grossly intact.

## 2019-01-02 DIAGNOSIS — K50.019 CROHN'S DISEASE OF SMALL INTESTINE WITH COMPLICATION: Primary | Chronic | ICD-10-CM

## 2019-01-02 RX ORDER — METOPROLOL SUCCINATE 25 MG/1
TABLET, EXTENDED RELEASE ORAL
Qty: 60 TABLET | Refills: 0 | OUTPATIENT
Start: 2019-01-02

## 2019-01-03 ENCOUNTER — PATIENT MESSAGE (OUTPATIENT)
Dept: GASTROENTEROLOGY | Facility: CLINIC | Age: 39
End: 2019-01-03

## 2019-01-03 ENCOUNTER — INFUSION (OUTPATIENT)
Dept: INFUSION THERAPY | Facility: OTHER | Age: 39
End: 2019-01-03
Attending: INTERNAL MEDICINE
Payer: MEDICAID

## 2019-01-03 VITALS
TEMPERATURE: 99 F | SYSTOLIC BLOOD PRESSURE: 127 MMHG | RESPIRATION RATE: 18 BRPM | HEART RATE: 104 BPM | BODY MASS INDEX: 17.9 KG/M2 | WEIGHT: 94.81 LBS | HEIGHT: 61 IN | DIASTOLIC BLOOD PRESSURE: 83 MMHG | OXYGEN SATURATION: 98 %

## 2019-01-03 DIAGNOSIS — K50.019 CROHN'S DISEASE OF SMALL INTESTINE WITH COMPLICATION: Primary | ICD-10-CM

## 2019-01-03 DIAGNOSIS — Z79.899 ENCOUNTER FOR LONG-TERM (CURRENT) USE OF HIGH-RISK MEDICATION: ICD-10-CM

## 2019-01-03 DIAGNOSIS — K50.819 CROHN'S DISEASE OF BOTH SMALL AND LARGE INTESTINE WITH COMPLICATION: ICD-10-CM

## 2019-01-03 PROCEDURE — 25000003 PHARM REV CODE 250: Performed by: INTERNAL MEDICINE

## 2019-01-03 PROCEDURE — 96365 THER/PROPH/DIAG IV INF INIT: CPT

## 2019-01-03 PROCEDURE — 63600175 PHARM REV CODE 636 W HCPCS: Performed by: INTERNAL MEDICINE

## 2019-01-03 PROCEDURE — 96413 CHEMO IV INFUSION 1 HR: CPT

## 2019-01-03 PROCEDURE — 96361 HYDRATE IV INFUSION ADD-ON: CPT

## 2019-01-03 RX ORDER — DIPHENHYDRAMINE HYDROCHLORIDE 50 MG/ML
25 INJECTION INTRAMUSCULAR; INTRAVENOUS
Status: ACTIVE | OUTPATIENT
Start: 2019-01-03 | End: 2019-01-03

## 2019-01-03 RX ORDER — DIPHENHYDRAMINE HYDROCHLORIDE 50 MG/ML
25 INJECTION INTRAMUSCULAR; INTRAVENOUS
Status: CANCELLED | OUTPATIENT
Start: 2019-01-03

## 2019-01-03 RX ORDER — METHYLPREDNISOLONE SOD SUCC 125 MG
40 VIAL (EA) INJECTION
Status: CANCELLED | OUTPATIENT
Start: 2019-01-03

## 2019-01-03 RX ORDER — HEPARIN 100 UNIT/ML
500 SYRINGE INTRAVENOUS
Status: CANCELLED | OUTPATIENT
Start: 2019-01-03

## 2019-01-03 RX ORDER — HEPARIN 100 UNIT/ML
500 SYRINGE INTRAVENOUS
Status: COMPLETED | OUTPATIENT
Start: 2019-01-03 | End: 2019-01-03

## 2019-01-03 RX ORDER — EPINEPHRINE 1 MG/ML
0.3 INJECTION, SOLUTION, CONCENTRATE INTRAVENOUS
Status: ACTIVE | OUTPATIENT
Start: 2019-01-03 | End: 2019-01-03

## 2019-01-03 RX ORDER — HEPARIN 100 UNIT/ML
500 SYRINGE INTRAVENOUS
Status: CANCELLED | OUTPATIENT
Start: 2019-01-04

## 2019-01-03 RX ORDER — SODIUM CHLORIDE 0.9 % (FLUSH) 0.9 %
10 SYRINGE (ML) INJECTION
Status: DISCONTINUED | OUTPATIENT
Start: 2019-01-03 | End: 2019-01-03 | Stop reason: HOSPADM

## 2019-01-03 RX ORDER — EPINEPHRINE 1 MG/ML
0.3 INJECTION, SOLUTION, CONCENTRATE INTRAVENOUS
Status: CANCELLED | OUTPATIENT
Start: 2019-01-03

## 2019-01-03 RX ORDER — HEPARIN 100 UNIT/ML
500 SYRINGE INTRAVENOUS
Status: DISPENSED | OUTPATIENT
Start: 2019-01-03 | End: 2019-01-03

## 2019-01-03 RX ORDER — DRONABINOL 5 MG/1
5 CAPSULE ORAL
Qty: 60 CAPSULE | Refills: 1 | Status: SHIPPED | OUTPATIENT
Start: 2019-01-03 | End: 2019-03-06 | Stop reason: SDUPTHER

## 2019-01-03 RX ORDER — METHYLPREDNISOLONE SOD SUCC 125 MG
40 VIAL (EA) INJECTION
Status: ACTIVE | OUTPATIENT
Start: 2019-01-03 | End: 2019-01-03

## 2019-01-03 RX ORDER — ACETAMINOPHEN 325 MG/1
650 TABLET ORAL
Status: CANCELLED | OUTPATIENT
Start: 2019-01-03

## 2019-01-03 RX ORDER — ACETAMINOPHEN 325 MG/1
650 TABLET ORAL
Status: ACTIVE | OUTPATIENT
Start: 2019-01-03 | End: 2019-01-03

## 2019-01-03 RX ORDER — SODIUM CHLORIDE 0.9 % (FLUSH) 0.9 %
10 SYRINGE (ML) INJECTION
Status: CANCELLED | OUTPATIENT
Start: 2019-01-03

## 2019-01-03 RX ORDER — IPRATROPIUM BROMIDE AND ALBUTEROL SULFATE 2.5; .5 MG/3ML; MG/3ML
3 SOLUTION RESPIRATORY (INHALATION)
Status: CANCELLED | OUTPATIENT
Start: 2019-01-03

## 2019-01-03 RX ADMIN — SODIUM CHLORIDE: 0.9 INJECTION, SOLUTION INTRAVENOUS at 11:01

## 2019-01-03 RX ADMIN — VEDOLIZUMAB 300 MG: 300 INJECTION, POWDER, LYOPHILIZED, FOR SOLUTION INTRAVENOUS at 12:01

## 2019-01-03 RX ADMIN — HEPARIN 500 UNITS: 100 SYRINGE at 12:01

## 2019-01-03 RX ADMIN — SODIUM CHLORIDE: 0.9 INJECTION, SOLUTION INTRAVENOUS at 10:01

## 2019-01-03 NOTE — PLAN OF CARE
Problem: Adult Inpatient Plan of Care  Goal: Plan of Care Review  Outcome: Ongoing (interventions implemented as appropriate)  Maintenance Entyvio and IVF administered, patient tolerated well. VSS, NAD. D/C'd home with self.

## 2019-01-06 ENCOUNTER — PATIENT MESSAGE (OUTPATIENT)
Dept: GASTROENTEROLOGY | Facility: CLINIC | Age: 39
End: 2019-01-06

## 2019-01-07 ENCOUNTER — HOSPITAL ENCOUNTER (INPATIENT)
Facility: HOSPITAL | Age: 39
LOS: 9 days | Discharge: HOME | DRG: 309 | End: 2019-01-16
Attending: EMERGENCY MEDICINE | Admitting: HOSPITALIST
Payer: MEDICAID

## 2019-01-07 DIAGNOSIS — E83.42 HYPOMAGNESEMIA: ICD-10-CM

## 2019-01-07 DIAGNOSIS — R31.9 URINARY TRACT INFECTION WITH HEMATURIA, SITE UNSPECIFIED: Primary | ICD-10-CM

## 2019-01-07 DIAGNOSIS — R50.9 FEVER: ICD-10-CM

## 2019-01-07 DIAGNOSIS — R29.818 TRANSIENT NEUROLOGICAL SYMPTOMS: ICD-10-CM

## 2019-01-07 DIAGNOSIS — E87.6 HYPOKALEMIA: ICD-10-CM

## 2019-01-07 DIAGNOSIS — R00.0 SINUS TACHYCARDIA: ICD-10-CM

## 2019-01-07 DIAGNOSIS — R19.7 DIARRHEA, UNSPECIFIED TYPE: ICD-10-CM

## 2019-01-07 DIAGNOSIS — K50.019 CROHN'S DISEASE OF SMALL INTESTINE WITH COMPLICATION: ICD-10-CM

## 2019-01-07 DIAGNOSIS — R63.4 WEIGHT LOSS: ICD-10-CM

## 2019-01-07 DIAGNOSIS — R00.0 TACHYCARDIA: ICD-10-CM

## 2019-01-07 DIAGNOSIS — D64.9 ANEMIA, UNSPECIFIED TYPE: ICD-10-CM

## 2019-01-07 DIAGNOSIS — R63.0 APPETITE IMPAIRED: ICD-10-CM

## 2019-01-07 DIAGNOSIS — N39.0 URINARY TRACT INFECTION WITH HEMATURIA, SITE UNSPECIFIED: Primary | ICD-10-CM

## 2019-01-07 DIAGNOSIS — F41.1 GENERALIZED ANXIETY DISORDER: ICD-10-CM

## 2019-01-07 PROBLEM — M85.89 OSTEOPENIA OF MULTIPLE SITES: Status: ACTIVE | Noted: 2019-01-07

## 2019-01-07 LAB
ALBUMIN SERPL BCP-MCNC: 4 G/DL
ALP SERPL-CCNC: 118 U/L
ALT SERPL W/O P-5'-P-CCNC: 18 U/L
AMPHET+METHAMPHET UR QL: NEGATIVE
ANION GAP SERPL CALC-SCNC: 11 MMOL/L
AST SERPL-CCNC: 20 U/L
BACTERIA #/AREA URNS AUTO: ABNORMAL /HPF
BARBITURATES UR QL SCN>200 NG/ML: NORMAL
BASOPHILS # BLD AUTO: 0.01 K/UL
BASOPHILS NFR BLD: 0.2 %
BENZODIAZ UR QL SCN>200 NG/ML: NORMAL
BILIRUB SERPL-MCNC: 0.4 MG/DL
BILIRUB UR QL STRIP: NEGATIVE
BUN SERPL-MCNC: 7 MG/DL
BZE UR QL SCN: NEGATIVE
CALCIUM SERPL-MCNC: 9.5 MG/DL
CANNABINOIDS UR QL SCN: NEGATIVE
CHLORIDE SERPL-SCNC: 107 MMOL/L
CLARITY UR REFRACT.AUTO: CLEAR
CO2 SERPL-SCNC: 21 MMOL/L
COLOR UR AUTO: YELLOW
CREAT SERPL-MCNC: 0.7 MG/DL
CREAT UR-MCNC: 21 MG/DL
CTP QC/QA: YES
DIFFERENTIAL METHOD: ABNORMAL
EOSINOPHIL # BLD AUTO: 0 K/UL
EOSINOPHIL NFR BLD: 0.3 %
ERYTHROCYTE [DISTWIDTH] IN BLOOD BY AUTOMATED COUNT: 15.1 %
EST. GFR  (AFRICAN AMERICAN): >60 ML/MIN/1.73 M^2
EST. GFR  (NON AFRICAN AMERICAN): >60 ML/MIN/1.73 M^2
ETHANOL UR-MCNC: <10 MG/DL
GLUCOSE SERPL-MCNC: 110 MG/DL
GLUCOSE UR QL STRIP: NEGATIVE
HCT VFR BLD AUTO: 37.4 %
HGB BLD-MCNC: 12.1 G/DL
HGB UR QL STRIP: ABNORMAL
IMM GRANULOCYTES # BLD AUTO: 0.01 K/UL
IMM GRANULOCYTES NFR BLD AUTO: 0.2 %
KETONES UR QL STRIP: NEGATIVE
LEUKOCYTE ESTERASE UR QL STRIP: ABNORMAL
LYMPHOCYTES # BLD AUTO: 2.2 K/UL
LYMPHOCYTES NFR BLD: 35.2 %
MCH RBC QN AUTO: 27.8 PG
MCHC RBC AUTO-ENTMCNC: 32.4 G/DL
MCV RBC AUTO: 86 FL
METHADONE UR QL SCN>300 NG/ML: NEGATIVE
MICROSCOPIC COMMENT: ABNORMAL
MONOCYTES # BLD AUTO: 0.5 K/UL
MONOCYTES NFR BLD: 7.5 %
NEUTROPHILS # BLD AUTO: 3.5 K/UL
NEUTROPHILS NFR BLD: 56.6 %
NITRITE UR QL STRIP: POSITIVE
NRBC BLD-RTO: 0 /100 WBC
OPIATES UR QL SCN: NEGATIVE
PCP UR QL SCN>25 NG/ML: NEGATIVE
PH UR STRIP: 6 [PH] (ref 5–8)
PLATELET # BLD AUTO: 277 K/UL
PMV BLD AUTO: 9.9 FL
POC MOLECULAR INFLUENZA A AGN: NEGATIVE
POC MOLECULAR INFLUENZA B AGN: NEGATIVE
POTASSIUM SERPL-SCNC: 3.4 MMOL/L
PROT SERPL-MCNC: 8.2 G/DL
PROT UR QL STRIP: NEGATIVE
RBC # BLD AUTO: 4.35 M/UL
RBC #/AREA URNS AUTO: 8 /HPF (ref 0–4)
SODIUM SERPL-SCNC: 139 MMOL/L
SP GR UR STRIP: 1 (ref 1–1.03)
SQUAMOUS #/AREA URNS AUTO: 1 /HPF
T4 FREE SERPL-MCNC: 0.92 NG/DL
TOXICOLOGY INFORMATION: NORMAL
URN SPEC COLLECT METH UR: ABNORMAL
WBC # BLD AUTO: 6.13 K/UL
WBC #/AREA URNS AUTO: 35 /HPF (ref 0–5)
YEAST UR QL AUTO: ABNORMAL

## 2019-01-07 PROCEDURE — 81001 URINALYSIS AUTO W/SCOPE: CPT

## 2019-01-07 PROCEDURE — 84439 ASSAY OF FREE THYROXINE: CPT

## 2019-01-07 PROCEDURE — 87088 URINE BACTERIA CULTURE: CPT

## 2019-01-07 PROCEDURE — 96361 HYDRATE IV INFUSION ADD-ON: CPT

## 2019-01-07 PROCEDURE — 87086 URINE CULTURE/COLONY COUNT: CPT

## 2019-01-07 PROCEDURE — 96374 THER/PROPH/DIAG INJ IV PUSH: CPT

## 2019-01-07 PROCEDURE — 99284 EMERGENCY DEPT VISIT MOD MDM: CPT | Mod: ,,, | Performed by: PHYSICIAN ASSISTANT

## 2019-01-07 PROCEDURE — 99284 PR EMERGENCY DEPT VISIT,LEVEL IV: ICD-10-PCS | Mod: ,,, | Performed by: PHYSICIAN ASSISTANT

## 2019-01-07 PROCEDURE — 80307 DRUG TEST PRSMV CHEM ANLYZR: CPT

## 2019-01-07 PROCEDURE — 93010 ELECTROCARDIOGRAM REPORT: CPT | Mod: ,,, | Performed by: INTERNAL MEDICINE

## 2019-01-07 PROCEDURE — 87186 SC STD MICRODIL/AGAR DIL: CPT

## 2019-01-07 PROCEDURE — 85025 COMPLETE CBC W/AUTO DIFF WBC: CPT

## 2019-01-07 PROCEDURE — 96375 TX/PRO/DX INJ NEW DRUG ADDON: CPT

## 2019-01-07 PROCEDURE — 87077 CULTURE AEROBIC IDENTIFY: CPT

## 2019-01-07 PROCEDURE — 99285 EMERGENCY DEPT VISIT HI MDM: CPT | Mod: 25

## 2019-01-07 PROCEDURE — 99223 PR INITIAL HOSPITAL CARE,LEVL III: ICD-10-PCS | Mod: ,,, | Performed by: HOSPITALIST

## 2019-01-07 PROCEDURE — 93005 ELECTROCARDIOGRAM TRACING: CPT

## 2019-01-07 PROCEDURE — 25000003 PHARM REV CODE 250: Performed by: PHYSICIAN ASSISTANT

## 2019-01-07 PROCEDURE — 87040 BLOOD CULTURE FOR BACTERIA: CPT

## 2019-01-07 PROCEDURE — 93010 EKG 12-LEAD: ICD-10-PCS | Mod: ,,, | Performed by: INTERNAL MEDICINE

## 2019-01-07 PROCEDURE — 80053 COMPREHEN METABOLIC PANEL: CPT

## 2019-01-07 PROCEDURE — 63600175 PHARM REV CODE 636 W HCPCS: Performed by: PHYSICIAN ASSISTANT

## 2019-01-07 PROCEDURE — 11000001 HC ACUTE MED/SURG PRIVATE ROOM

## 2019-01-07 PROCEDURE — 99223 1ST HOSP IP/OBS HIGH 75: CPT | Mod: ,,, | Performed by: HOSPITALIST

## 2019-01-07 PROCEDURE — 25000003 PHARM REV CODE 250: Performed by: HOSPITALIST

## 2019-01-07 PROCEDURE — 63600175 PHARM REV CODE 636 W HCPCS: Performed by: HOSPITALIST

## 2019-01-07 RX ORDER — ACETAMINOPHEN 325 MG/1
650 TABLET ORAL EVERY 8 HOURS PRN
Status: DISCONTINUED | OUTPATIENT
Start: 2019-01-07 | End: 2019-01-16 | Stop reason: HOSPADM

## 2019-01-07 RX ORDER — DRONABINOL 2.5 MG/1
5 CAPSULE ORAL
Status: COMPLETED | OUTPATIENT
Start: 2019-01-08 | End: 2019-01-13

## 2019-01-07 RX ORDER — VALACYCLOVIR HYDROCHLORIDE 500 MG/1
500 TABLET, FILM COATED ORAL DAILY
Status: DISCONTINUED | OUTPATIENT
Start: 2019-01-08 | End: 2019-01-11

## 2019-01-07 RX ORDER — ACETAMINOPHEN 325 MG/1
650 TABLET ORAL
Status: COMPLETED | OUTPATIENT
Start: 2019-01-07 | End: 2019-01-07

## 2019-01-07 RX ORDER — PROMETHAZINE HYDROCHLORIDE 25 MG/1
25 TABLET ORAL EVERY 6 HOURS PRN
Status: DISCONTINUED | OUTPATIENT
Start: 2019-01-07 | End: 2019-01-16 | Stop reason: HOSPADM

## 2019-01-07 RX ORDER — LANOLIN ALCOHOL/MO/W.PET/CERES
400 CREAM (GRAM) TOPICAL DAILY
Status: DISCONTINUED | OUTPATIENT
Start: 2019-01-08 | End: 2019-01-16 | Stop reason: HOSPADM

## 2019-01-07 RX ORDER — ALPRAZOLAM 0.5 MG/1
1 TABLET ORAL 2 TIMES DAILY
Status: DISCONTINUED | OUTPATIENT
Start: 2019-01-07 | End: 2019-01-07

## 2019-01-07 RX ORDER — SODIUM CHLORIDE 0.9 % (FLUSH) 0.9 %
5 SYRINGE (ML) INJECTION
Status: DISCONTINUED | OUTPATIENT
Start: 2019-01-07 | End: 2019-01-16 | Stop reason: HOSPADM

## 2019-01-07 RX ORDER — OXYCODONE AND ACETAMINOPHEN 10; 325 MG/1; MG/1
1 TABLET ORAL EVERY 6 HOURS PRN
Status: DISCONTINUED | OUTPATIENT
Start: 2019-01-07 | End: 2019-01-16 | Stop reason: HOSPADM

## 2019-01-07 RX ORDER — CEFTRIAXONE 1 G/1
1 INJECTION, POWDER, FOR SOLUTION INTRAMUSCULAR; INTRAVENOUS
Status: COMPLETED | OUTPATIENT
Start: 2019-01-07 | End: 2019-01-07

## 2019-01-07 RX ORDER — LOPERAMIDE HYDROCHLORIDE 2 MG/1
2 CAPSULE ORAL DAILY PRN
Status: DISCONTINUED | OUTPATIENT
Start: 2019-01-07 | End: 2019-01-11

## 2019-01-07 RX ORDER — LORAZEPAM 2 MG/ML
1 INJECTION INTRAMUSCULAR
Status: COMPLETED | OUTPATIENT
Start: 2019-01-07 | End: 2019-01-07

## 2019-01-07 RX ORDER — DIPHENOXYLATE HYDROCHLORIDE AND ATROPINE SULFATE 2.5; .025 MG/1; MG/1
1 TABLET ORAL 4 TIMES DAILY PRN
Status: DISCONTINUED | OUTPATIENT
Start: 2019-01-07 | End: 2019-01-11

## 2019-01-07 RX ORDER — ZOLPIDEM TARTRATE 5 MG/1
5 TABLET ORAL NIGHTLY PRN
Status: DISCONTINUED | OUTPATIENT
Start: 2019-01-07 | End: 2019-01-16 | Stop reason: HOSPADM

## 2019-01-07 RX ORDER — SODIUM CHLORIDE AND POTASSIUM CHLORIDE 150; 900 MG/100ML; MG/100ML
INJECTION, SOLUTION INTRAVENOUS CONTINUOUS
Status: DISPENSED | OUTPATIENT
Start: 2019-01-07 | End: 2019-01-08

## 2019-01-07 RX ORDER — POTASSIUM CITRATE 10 MEQ/1
30 TABLET, EXTENDED RELEASE ORAL 2 TIMES DAILY
Status: DISCONTINUED | OUTPATIENT
Start: 2019-01-07 | End: 2019-01-16 | Stop reason: HOSPADM

## 2019-01-07 RX ORDER — ALPRAZOLAM 0.5 MG/1
1 TABLET ORAL 2 TIMES DAILY PRN
Status: DISCONTINUED | OUTPATIENT
Start: 2019-01-07 | End: 2019-01-10

## 2019-01-07 RX ADMIN — CEFTRIAXONE SODIUM 1 G: 1 INJECTION, POWDER, FOR SOLUTION INTRAMUSCULAR; INTRAVENOUS at 06:01

## 2019-01-07 RX ADMIN — LORAZEPAM 1 MG: 2 INJECTION INTRAMUSCULAR; INTRAVENOUS at 02:01

## 2019-01-07 RX ADMIN — OXYCODONE HYDROCHLORIDE AND ACETAMINOPHEN 1 TABLET: 10; 325 TABLET ORAL at 10:01

## 2019-01-07 RX ADMIN — SODIUM CHLORIDE 1000 ML: 0.9 INJECTION, SOLUTION INTRAVENOUS at 02:01

## 2019-01-07 RX ADMIN — SODIUM CHLORIDE AND POTASSIUM CHLORIDE: .9; .15 SOLUTION INTRAVENOUS at 09:01

## 2019-01-07 RX ADMIN — ACETAMINOPHEN 650 MG: 325 TABLET, FILM COATED ORAL at 06:01

## 2019-01-07 RX ADMIN — ALPRAZOLAM 1 MG: 0.5 TABLET ORAL at 09:01

## 2019-01-07 RX ADMIN — SODIUM CHLORIDE 1000 ML: 0.9 INJECTION, SOLUTION INTRAVENOUS at 04:01

## 2019-01-07 RX ADMIN — POTASSIUM CITRATE 30 MEQ: 10 TABLET ORAL at 09:01

## 2019-01-07 NOTE — ED NOTES
LOC/ APPEARANCE: The patient is awake, alert and oriented x 4. Pt is speaking appropriately, no slurred speech. Patient resting comfortably and in no acute distress. Uncontrollably shaking.   SKIN: Skin is warm, dry and intact. Capillary refill <3 seconds. No breakdown or brusing note. Pt has moist mucus membranes. Skin is flushed on face and neck area.  MUSCULOSKELETAL: Full range of motion present in all extremities. Hand  equal and leg strength strong bilaterally.   RESPIRATORY: Airway is open and patent. Respirations are even and non labored. Denies shortness of breath currently.  CARDIAC: sinus tachy at about 120. No peripheral edema noted in extremities. Patient denies chest pain.  GI: Abdomen is soft and non tender. Normal bowel sounds in all four quadrants.   NEUROLOGIC: Eyes open spontaneously and facial expression symmetrical. Pt behavior appropriate to situation, and pt follows commands.  Pt reports sensation present in all extremities when touched with a finger. PERRLA.  : No complaints of frequency, burning, urgency or blood in the urine. No complaints of incontinence. Has ileostomy.

## 2019-01-07 NOTE — ED PROVIDER NOTES
Encounter Date: 1/7/2019    SCRIBE #1 NOTE: I, Brigitte Salgado, am scribing for, and in the presence of,  Dr. Luna. I have scribed the following portions of the note - the APC attestation.       History     Chief Complaint   Patient presents with    Tachycardia     This is a 38 year old female with a PMH of HTN, Crohn's disease s/p total colectomy w/ end ileostomy (2012) on Entyvio infusions who presents to the ED with a chief complaint of palpitations. She reports abrupt onset of palpitations associated with tachycardia, facial flushing, and tremulousness 20 minutes prior to arrival. She was eating lunch with her daughter and mother when the symptoms started. She attempted treatment with xanax with minimal improvement. Symptoms are still present at time of evaluation. She has a prior hx of similar episodes, has been attributed to dehydration 2/2 malabsorption and anxiety in prior admissions. Patient also notes weight loss, overall not feeling well today. She denies fever, chills, URI symptoms, nausea/vomiting, abdominal pain, changes in urination or stool output.    Patient treated for klebsiella UTI in December with cipro - bactrim - keflex per urology notes.          Review of patient's allergies indicates:   Allergen Reactions    Azathioprine sodium Other (See Comments)     Other reaction(s): pancreatitis  Other reaction(s): pancreatitis    Methotrexate analogues Other (See Comments)     leukopenia    Stelara [ustekinumab] Other (See Comments)     Multiple infections    Zofran [ondansetron hcl (pf)] Other (See Comments)     Per patient causes prolong QT    Vancomycin analogues Hives    Morphine Itching and Other (See Comments)     Other reaction(s): Itching    Bactrim [sulfamethoxazole-trimethoprim] Palpitations    Ciprofloxacin Palpitations     Past Medical History:   Diagnosis Date    Abnormal Pap smear 2007    Abnormal Pap smear 5/26/2011    Anemia     Anxiety     Arthritis     C. difficile  diarrhea     Crohn's disease     Depression 8/5/2017    Encounter for blood transfusion     Genital HSV     History of colposcopy with cervical biopsy 2007 and 7/2011 2007-LYLA I  and 7/2011- LYLA I    Hypertension     Kidney stone     Kidney stone     Recurrent UTI 4/3/2013    S/P ileostomy 7/9/2012    Sterilization 6/23/2012     Past Surgical History:   Procedure Laterality Date    ABDOMINAL SURGERY      APPENDECTOMY      BLADDER SURGERY      partial cystectomy due to fistula    CKC      COLON SURGERY      COLONOSCOPY      EGD (ESOPHAGOGASTRODUODENOSCOPY) N/A 9/6/2013    Performed by [facility]ilio Cameron [facility] at [facility] (4TH FLR)    ESOPHAGOGASTRODUODENOSCOPY (EGD) N/A 10/14/2016    Performed by Janelle Mcpherson MD at [facility] END[facility] (2ND FLR)    ESOPHAGOGASTRODUODENOSCOPY (EGD) N/A 10/23/2014    Performed by Nathanael Mitchell MD at [facility] (2ND FLR)    EXAM UNDER ANESTHESIA N/A 8/29/2013    Performed by Bob Parsons MD at [facility] OR 2ND FLR    EXAM UNDER ANESTHESIA N/A 1/18/2013    Performed by Bob Parsons MD at [facility] OR 2ND FLR    HYSTERECTOMY-ABDOMINAL-TOTAL (SHELLIE) N/A 4/16/2015    Performed by [facility] at [facility] OR    ILEOSCOPY N/A 8/8/2017    Performed by Tommie Klein MD at [facility] END[facility] (2ND FLR)    ILEOSCOPY N/A 10/14/2016    Performed by Janlele Mcpherson MD at [facility] (2ND FLR)    ILEOSCOPY N/A 9/25/2013    Performed by Emilio Cameron MD at [facility] (4TH FLR)    ILEOSTOMY      INCISION AND DRAINAGE, ABSCESS N/A 8/29/2013    Performed by Bob Parsons MD at [facility] OR 2ND FLR    [id number]-F-CATH Left 2/21/2017    Performed by Parish Sanchez Jr., MD at [facility] OR 2ND FLR    OOPHORECTOMY Right 04/16/2015    PORTACATH PLACEMENT  02/21/2017    REPAIR-BLADDER  4/16/2015    Performed by [facility]ales MD at [facility] OR    SALPINGO-OOPHERECTOMY Right 4/16/2015    Performed by [facility] at [facility] OR    SIGMOIDOSCOPY, FLEXIBLE N/A 2/7/2014    Performed by  [facility]ell [facility] at [facility] ([facility])    SKIN BIOPSY      SMALL INTESTINE SURGERY      TOTAL ABDOMINAL HYSTERECTOMY  04/16/2015    TOTAL COLECTOMY      TUBAL LIGATION  06/06/2012    UPPER GASTROINTESTINAL ENDOSCOPY       Family History   Problem Relation Age of Onset    Colon cancer Father     Cancer Father         colon cancer    Hypertension Mother     Cancer Maternal Grandfather         esopghal     Skin cancer Maternal Grandfather     Endometrial cancer Maternal Aunt     Crohn's disease Brother     Breast cancer Neg Hx     Ovarian cancer Neg Hx      Social History     Tobacco Use    Smoking status: Never Smoker    Smokeless tobacco: Never Used   Substance Use Topics    Alcohol use: Yes     Alcohol/week: 0.0 oz     Comment: Patient only drinks wine not regular basis.    Drug use: No     Review of Systems   Constitutional: Positive for fatigue. Negative for chills and fever.   HENT: Negative for sore throat.    Respiratory: Negative for shortness of breath.    Cardiovascular: Positive for palpitations. Negative for chest pain.   Gastrointestinal: Negative for nausea and vomiting.   Genitourinary: Negative for dysuria.   Musculoskeletal: Negative for back pain.   Skin: Positive for color change (flushing) and rash.   Neurological: Negative for weakness.        +tremulous   Hematological: Does not bruise/bleed easily.       Physical Exam     Initial Vitals [01/07/19 1401]   BP Pulse Resp Temp SpO2   (!) 179/80 (!) 150 18 99.2 °F (37.3 °C) 100 %      MAP       --         Physical Exam    Constitutional: She appears well-developed and well-nourished. No distress.   HENT:   Head: Atraumatic.   Eyes: Conjunctivae and EOM are normal. Pupils are equal, round, and reactive to light.   Cardiovascular: Regular rhythm and normal heart sounds. Tachycardia present.    Pulmonary/Chest: Breath sounds normal. No respiratory distress. She has no wheezes. She has no rhonchi. She has no rales.    Abdominal: Soft. Bowel sounds are normal. She exhibits no distension. There is no tenderness. There is no rigidity, no rebound, no guarding and no CVA tenderness.   Colostomy with normal output.   Neurological: She is alert and oriented to person, place, and time. She has normal strength.   Generalized tremulousness of extremities.   Skin: Skin is warm and dry. Rash noted.   Facial flushing, erythematous macular rash of the neck and chest.   Psychiatric:   Tearful.          ED Course   Procedures  Labs Reviewed   CBC W/ AUTO DIFFERENTIAL - Abnormal; Notable for the following components:       Result Value    RDW 15.1 (*)     All other components within normal limits   COMPREHENSIVE METABOLIC PANEL - Abnormal; Notable for the following components:    Potassium 3.4 (*)     CO2 21 (*)     All other components within normal limits   URINALYSIS, REFLEX TO URINE CULTURE - Abnormal; Notable for the following components:    Occult Blood UA 1+ (*)     Nitrite, UA Positive (*)     Leukocytes, UA 3+ (*)     All other components within normal limits   URINALYSIS MICROSCOPIC - Abnormal; Notable for the following components:    RBC, UA 8 (*)     WBC, UA 35 (*)     Bacteria, UA Many (*)     Yeast, UA Few (*)     All other components within normal limits   CULTURE, BLOOD   CULTURE, BLOOD   CULTURE, URINE   POCT INFLUENZA A/B MOLECULAR          Imaging Results          X-Ray Chest PA And Lateral (Final result)  Result time 01/07/19 17:34:50    Final result by Suzi Martinez MD (01/07/19 17:34:50)                 Impression:      No acute cardiopulmonary abnormality.      Electronically signed by: Suzi Martinez  Date:    01/07/2019  Time:    17:34             Narrative:    EXAMINATION:  XR CHEST PA AND LATERAL    CLINICAL HISTORY:  Fever, unspecified    TECHNIQUE:  PA and lateral views of the chest were performed.    COMPARISON:  Multiple priors, most recent 12/11/2018    FINDINGS:  Stable positioning of a left chest wall port  catheter.The lungs are clear.  No focal consolidation, pleural effusion or pneumothorax.    Normal cardiomediastinal contour.    No acute or aggressive osseous abnormality. Scoliosis.                                       APC / Resident Notes:   38 year old female presenting with palpitations, facial flushing and tremulousness.  On exam she is tachycardic to 120s. She appears unwell. Lungs are clear. Abdomen is soft, nontender.    DDx includes but is not limited to arrhythmia, dehydration, electrolyte derangement.    During ED visit pt became febrile to 100.1F, tachycardia into the 120s persists despite 2L saline fluid bolus. Tremors improved significantly. Labs are stable without leukocytosis. Blood cultures added. Influenza negative. CXR without acute process. Urinalysis with nitrite positive pyuria. Will treat with Rocephin and admit to medicine. I discussed the care of this patient with my supervising MD.          Attending Attestation:     Physician Attestation Statement for NP/PA:   I discussed this assessment and plan of this patient with the NP/PA, but I did not personally examine the patient. The face to face encounter was performed by the NP/PA.                     Clinical Impression:   The primary encounter diagnosis was Urinary tract infection with hematuria, site unspecified. Diagnoses of Tachycardia and Fever were also pertinent to this visit.      Disposition:   Disposition: Admitted  Condition: Stable                        Lubna Florez PA-C  01/07/19 [id number]       Sanjeev De Leon MD  01/11/19 [id number]

## 2019-01-07 NOTE — ED NOTES
Pt states she doesn't really feel better. Still having uncontrollable shakes. Will continue to monitor. Call light within reach.

## 2019-01-08 DIAGNOSIS — F41.1 GENERALIZED ANXIETY DISORDER: Primary | ICD-10-CM

## 2019-01-08 DIAGNOSIS — R00.0 SINUS TACHYCARDIA: ICD-10-CM

## 2019-01-08 PROBLEM — E83.42 HYPOMAGNESEMIA: Status: ACTIVE | Noted: 2019-01-08

## 2019-01-08 PROBLEM — D64.9 ANEMIA: Status: ACTIVE | Noted: 2019-01-08

## 2019-01-08 LAB
ANION GAP SERPL CALC-SCNC: 10 MMOL/L
BASOPHILS # BLD AUTO: 0.02 K/UL
BASOPHILS NFR BLD: 0.3 %
BUN SERPL-MCNC: 4 MG/DL
CALCIUM SERPL-MCNC: 8.9 MG/DL
CHLORIDE SERPL-SCNC: 111 MMOL/L
CO2 SERPL-SCNC: 20 MMOL/L
CREAT SERPL-MCNC: 0.6 MG/DL
DIFFERENTIAL METHOD: ABNORMAL
EOSINOPHIL # BLD AUTO: 0.1 K/UL
EOSINOPHIL NFR BLD: 0.6 %
ERYTHROCYTE [DISTWIDTH] IN BLOOD BY AUTOMATED COUNT: 15.5 %
EST. GFR  (AFRICAN AMERICAN): >60 ML/MIN/1.73 M^2
EST. GFR  (NON AFRICAN AMERICAN): >60 ML/MIN/1.73 M^2
GLUCOSE SERPL-MCNC: 85 MG/DL
HCT VFR BLD AUTO: 32.1 %
HGB BLD-MCNC: 10.2 G/DL
IMM GRANULOCYTES # BLD AUTO: 0.02 K/UL
IMM GRANULOCYTES NFR BLD AUTO: 0.3 %
LYMPHOCYTES # BLD AUTO: 2.4 K/UL
LYMPHOCYTES NFR BLD: 30.6 %
MAGNESIUM SERPL-MCNC: 1.5 MG/DL
MCH RBC QN AUTO: 27.3 PG
MCHC RBC AUTO-ENTMCNC: 31.8 G/DL
MCV RBC AUTO: 86 FL
MONOCYTES # BLD AUTO: 0.7 K/UL
MONOCYTES NFR BLD: 9.2 %
NEUTROPHILS # BLD AUTO: 4.7 K/UL
NEUTROPHILS NFR BLD: 59 %
NRBC BLD-RTO: 0 /100 WBC
PLATELET # BLD AUTO: 244 K/UL
PMV BLD AUTO: 10.3 FL
POTASSIUM SERPL-SCNC: 3.8 MMOL/L
RBC # BLD AUTO: 3.74 M/UL
SODIUM SERPL-SCNC: 141 MMOL/L
WBC # BLD AUTO: 7.9 K/UL

## 2019-01-08 PROCEDURE — 63600175 PHARM REV CODE 636 W HCPCS: Performed by: HOSPITALIST

## 2019-01-08 PROCEDURE — 80048 BASIC METABOLIC PNL TOTAL CA: CPT

## 2019-01-08 PROCEDURE — 99233 PR SUBSEQUENT HOSPITAL CARE,LEVL III: ICD-10-PCS | Mod: ,,, | Performed by: HOSPITALIST

## 2019-01-08 PROCEDURE — 99233 SBSQ HOSP IP/OBS HIGH 50: CPT | Mod: ,,, | Performed by: HOSPITALIST

## 2019-01-08 PROCEDURE — 36415 COLL VENOUS BLD VENIPUNCTURE: CPT

## 2019-01-08 PROCEDURE — 83735 ASSAY OF MAGNESIUM: CPT

## 2019-01-08 PROCEDURE — 85025 COMPLETE CBC W/AUTO DIFF WBC: CPT

## 2019-01-08 PROCEDURE — 11000001 HC ACUTE MED/SURG PRIVATE ROOM

## 2019-01-08 PROCEDURE — 25000003 PHARM REV CODE 250: Performed by: HOSPITALIST

## 2019-01-08 RX ORDER — MAGNESIUM SULFATE HEPTAHYDRATE 40 MG/ML
2 INJECTION, SOLUTION INTRAVENOUS ONCE
Status: COMPLETED | OUTPATIENT
Start: 2019-01-08 | End: 2019-01-08

## 2019-01-08 RX ORDER — CEFTRIAXONE 1 G/1
1 INJECTION, POWDER, FOR SOLUTION INTRAMUSCULAR; INTRAVENOUS
Status: COMPLETED | OUTPATIENT
Start: 2019-01-08 | End: 2019-01-08

## 2019-01-08 RX ADMIN — POTASSIUM CITRATE 30 MEQ: 10 TABLET ORAL at 09:01

## 2019-01-08 RX ADMIN — OXYCODONE HYDROCHLORIDE AND ACETAMINOPHEN 1 TABLET: 10; 325 TABLET ORAL at 08:01

## 2019-01-08 RX ADMIN — MAGNESIUM OXIDE TAB 400 MG (241.3 MG ELEMENTAL MG) 400 MG: 400 (241.3 MG) TAB at 09:01

## 2019-01-08 RX ADMIN — MAGNESIUM SULFATE IN WATER 2 G: 40 INJECTION, SOLUTION INTRAVENOUS at 09:01

## 2019-01-08 RX ADMIN — OXYCODONE HYDROCHLORIDE AND ACETAMINOPHEN 1 TABLET: 10; 325 TABLET ORAL at 12:01

## 2019-01-08 RX ADMIN — DRONABINOL 5 MG: 2.5 CAPSULE ORAL at 04:01

## 2019-01-08 RX ADMIN — ZOLPIDEM TARTRATE 5 MG: 5 TABLET ORAL at 08:01

## 2019-01-08 RX ADMIN — CEFTRIAXONE SODIUM 1 G: 1 INJECTION, POWDER, FOR SOLUTION INTRAMUSCULAR; INTRAVENOUS at 01:01

## 2019-01-08 RX ADMIN — DRONABINOL 5 MG: 2.5 CAPSULE ORAL at 07:01

## 2019-01-08 RX ADMIN — VALACYCLOVIR HYDROCHLORIDE 500 MG: 500 TABLET, FILM COATED ORAL at 09:01

## 2019-01-08 RX ADMIN — LOPERAMIDE HYDROCHLORIDE 2 MG: 2 CAPSULE ORAL at 12:01

## 2019-01-08 RX ADMIN — ACETAMINOPHEN 650 MG: 325 TABLET ORAL at 06:01

## 2019-01-08 RX ADMIN — PROMETHAZINE HYDROCHLORIDE 25 MG: 25 TABLET ORAL at 09:01

## 2019-01-08 RX ADMIN — ALPRAZOLAM 1 MG: 0.5 TABLET ORAL at 09:01

## 2019-01-08 RX ADMIN — ZOLPIDEM TARTRATE 5 MG: 5 TABLET ORAL at 12:01

## 2019-01-08 RX ADMIN — ALPRAZOLAM 1 MG: 0.5 TABLET ORAL at 08:01

## 2019-01-08 NOTE — ASSESSMENT & PLAN NOTE
Chronic, stable, s/p ileostomy. Output about the same.   Continue lomotil/loperamide prn.   Continue Oxy IR for pain relief, prn.

## 2019-01-08 NOTE — ASSESSMENT & PLAN NOTE
Chronic, due to GI losses. On K+ supplementation. K 3.4 on admission. IVF w/ K rider ordered. Continue home supplement.

## 2019-01-08 NOTE — H&P
[facility] Hospital Medicine  History & Physical    Patient Name: Ivy Salgado  MRN: [id number]  Admission Date: 1/7/2019  Attending Physician: Ant Bourgeois MD   Primary Care Provider: Kalia Astorga MD    [facility] Medicine Team: [facility] MED B Charlette Santana MD     Patient information was obtained from patient, past medical records and ER records.     Subjective:     Principal Problem:Sinus tachycardia    Chief Complaint:   Chief Complaint   Patient presents with    Tachycardia        HPI: Ms. Salgado is a 37 yo F with HTN, Crohn's disease (s/p total colectomy w/ end ileostomy (2012)) on Entyvio infusions, recurrent UTIs and osteopenia who presents to the ED with a chief complaint of palpitations. She reports abrupt onset of palpitations associated with tachycardia, facial flushing, and tremulousness 20 minutes prior to arrival. She was eating lunch with her daughter and mother when the symptoms started. She attempted treatment with xanax with minimal improvement. Symptoms were still present at time of evaluation. She has a prior hx of similar episodes for over a year now, which have been attributed to dehydration (2/2 malabsorption) and anxiety. Patient also notes weight loss, overall not feeling well today. She denies fever, chills, URI symptoms, nausea/vomiting, abdominal pain, changes in urination or stool output. She denies any precipitating stressors. She has been worked up for this recently by Endocrinology and Cardiology, with plasma catecholamines/metanephrines, as well as event recorders (9/18-->20 symptomatic transmissions all c/w sinus tachycardia; 4/18 also revealed sinus rhythm and sinus tachycardia). She is in the process of establishing with neuroendocrine for evaluation for carcinoid.     In the ED, an EKG showed sinus tachycardia, similar to previous episodes. A CXR was -ve for acute processes. Her CBC/CMP were notable only for mild hypokalemia. A UA was +ve for  bacteria, nitrites. She denied dysuria, but endorsed frequency.     Upon chart review, patient was most recently treated with Augmentin, Bactrim and Keflex in December for UTI. Cultures have previously grown: 12/04 Klebsiella (R to ampicillin), 11/21 Klebsiella (R to ampicillin), 9/18 Enterococcus (R to tetracycline), 4/12 Enterobacter (pan sensitive), 2/02 ESBL (R to cephlosporins, FQs, ampicillin).         Past Medical History:   Diagnosis Date    Abnormal Pap smear 2007    Abnormal Pap smear 5/26/2011    Anemia     Anxiety     Arthritis     C. difficile diarrhea     Crohn's disease     Depression 8/5/2017    Encounter for blood transfusion     Genital HSV     History of colposcopy with cervical biopsy 2007 and 7/2011 2007-LYLA I  and 7/2011- LYLA I    Hypertension     Kidney stone     Kidney stone     Recurrent UTI 4/3/2013    S/P ileostomy 7/9/2012    Sterilization 6/23/2012       Past Surgical History:   Procedure Laterality Date    ABDOMINAL SURGERY      APPENDECTOMY      BLADDER SURGERY      partial cystectomy due to fistula    CKC      COLON SURGERY      COLONOSCOPY      EGD (ESOPHAGOGASTRODUODENOSCOPY) N/A 9/6/2013    Performed by [facility]ilio [facility] at [facility] ENDO (4TH FLR)    ESOPHAGOGASTRODUODENOSCOPY (EGD) N/A 10/14/2016    Performed by Janelle Mcpherson MD at [facility] ENDO (2ND FLR)    ESOPHAGOGASTRODUODENOSCOPY (EGD) N/A 10/23/2014    Performed by Nathanael Mitchell MD at [facility] ENDO (2ND FLR)    EXAM UNDER ANESTHESIA N/A 8/29/2013    Performed by Bob Parsons MD at [facility] OR 2ND FLR    EXAM UNDER ANESTHESIA N/A 1/18/2013    [facility]formed by [facility] at [facility] OR 2ND FLR    HYSTERECTOMY-ABDOMINAL-TOTAL (SHELLIE) N/A 4/16/2015    [facility] by [facility] at [facility] OR    ILEOSCOPY N/A 8/8/2017    Performed by Tommie Klein MD at [facility] ENDO (2ND FLR)    ILEOSCOPY N/A 10/14/2016    Performed by [facility]pherson MD at [facility] ENDO (2ND FLR)    ILEOSCOPY N/A 9/25/2013     [facility]formed by [facility] at [facility] ENDO (4[facility] FLR)    ILEOSTOMY      INCISION AND DRAINAGE, ABSCESS N/A 8/29/2013    Performed by [facility] at [facility] OR 2ND FLR    VSXQBQRSQ-UQIP-E-CATH Left 2/21/2017    Performed by Parish Sanchez Jr., MD at [facility] OR 2ND FLR    OOPHORECTOMY Right 04/16/2015    PORTACATH PLACEMENT  02/21/2017    REPAIR-BLADDER  4/16/2015    [facility] at [facility]    SALPINGO-OOPHERECTOMY Right 4/16/2015    [facility] at [facility] OR    SIGMOIDOSCOPY, FLEXIBLE N/A 2/7/2014    Performed by Erasto Sewell MD at [facility] ENDO (2ND FLR)    SKIN BIOPSY      SMALL INTESTINE SURGERY      TOTAL ABDOMINAL HYSTERECTOMY  04/16/2015    TOTAL COLECTOMY      TUBAL LIGATION  06/06/2012    UPPER GASTROINTESTINAL ENDOSCOPY         Review of patient's allergies indicates:   Allergen Reactions    Azathioprine sodium Other (See Comments)     Other reaction(s): pancreatitis  Other reaction(s): pancreatitis    Methotrexate analogues Other (See Comments)     leukopenia    Stelara [ustekinumab] Other (See Comments)     Multiple infections    Zofran [ondansetron hcl (pf)] Other (See Comments)     Per patient causes prolong QT    Vancomycin analogues Hives    Morphine Itching and Other (See Comments)     Other reaction(s): Itching    Bactrim [sulfamethoxazole-trimethoprim] Palpitations    Ciprofloxacin Palpitations       No current facility-administered medications on file prior to encounter.      Current Outpatient Medications on File Prior to Encounter   Medication Sig    ALPRAZolam (XANAX) 1 MG tablet Take 1 tablet (1 mg total) by mouth 2 (two) times daily. as needed for anxiety.    diphenoxylate-atropine 2.5-0.025 mg (LOMOTIL) 2.5-0.025 mg per tablet Take 1 tablet by mouth 4 (four) times daily as needed for Diarrhea.    dronabinol (MARINOL) 5 MG capsule Take 1 capsule (5 mg total) by mouth 2 (two) times daily before meals.     loperamide (IMODIUM) 2 mg capsule Take 2 mg by mouth daily as needed for Diarrhea.    magnesium 250 mg Tab Take by mouth once.    multivitamin (ONE DAILY MULTIVITAMIN) per tablet Take 1 tablet by mouth once daily.    potassium citrate 15 mEq TbSR Take 2 tablets by mouth 2 (two) times daily.    promethazine (PHENERGAN) 25 MG tablet Take 1 tablet (25 mg total) by mouth every 6 (six) hours as needed.    valACYclovir (VALTREX) 500 MG tablet TK 1 T PO QD    butalbital-acetaminophen-caffeine -40 mg (FIORICET, ESGIC) -40 mg per tablet Take 1 tablet by mouth every 4 (four) hours as needed for Headaches.    clindamycin (CLINDESSE) 2 % vaginal cream PLACE VAGINALLY EVERY EVENING    flu vac [id number] 36mos up,PF, 60 mcg (15 mcg x 4)/0.5 mL Syrg inject into the muscle    oxyCODONE-acetaminophen (PERCOCET)  mg per tablet Take 1 tablet by mouth every 6 (six) hours as needed for Pain.    terconazole (TERAZOL 7) 0.4 % Crea INSERT ONE APPLICATORFUL VAGINALLY AT BEDTIME    zolpidem (AMBIEN) 10 mg Tab TAKE 1 TABLET BY MOUTH EVERY EVENING    [DISCONTINUED] biotin 1,000 mcg Chew Take 1 tablet by mouth once daily.    [DISCONTINUED] metroNIDAZOLE (FLAGYL) 500 MG tablet Take 1 tablet (500 mg total) by mouth every 8 (eight) hours.     Family History     Problem Relation (Age of Onset)    Cancer Father, Maternal Grandfather    Colon cancer Father    Crohn's disease Brother    Endometrial cancer Maternal Aunt    Hypertension Mother    Skin cancer Maternal Grandfather        Tobacco Use    Smoking status: Never Smoker    Smokeless tobacco: Never Used   Substance and Sexual Activity    Alcohol use: Yes     Alcohol/week: 0.0 oz     Comment: Patient only drinks wine not regular basis.    Drug use: No    Sexual activity: Yes     Partners: Male     Birth control/protection: Pill, Surgical, Condom     Comment: HYST     Review of Systems   Constitutional: Positive for diaphoresis and unexpected weight change (7lb  weight loss). Negative for activity change, appetite change, chills, fatigue and fever.   Eyes: Negative for photophobia and visual disturbance.   Respiratory: Negative for apnea, cough, choking, shortness of breath, wheezing and stridor.    Cardiovascular: Positive for palpitations. Negative for chest pain and leg swelling.   Gastrointestinal: Negative for abdominal pain, blood in stool, constipation, diarrhea, nausea and vomiting.   Endocrine: Negative for cold intolerance and heat intolerance.   Genitourinary: Positive for frequency. Negative for difficulty urinating.   Musculoskeletal: Negative for arthralgias and myalgias.   Skin: Negative for rash and wound.   Allergic/Immunologic: Negative for immunocompromised state.   Neurological: Negative for dizziness, weakness and headaches.   Psychiatric/Behavioral: Negative for agitation, behavioral problems and confusion.     Objective:     Vital Signs (Most Recent):  Temp: 99.6 °F (37.6 °C) (01/07/19 1829)  Pulse: 108 (01/07/19 1822)  Resp: 19 (01/07/19 1702)  BP: 127/81 (01/07/19 1822)  SpO2: 96 % (01/07/19 1822) Vital Signs (24h Range):  Temp:  [99.2 °F (37.3 °C)-100.1 °F (37.8 °C)] 99.6 °F (37.6 °C)  Pulse:  [100-150] 108  Resp:  [18-19] 19  SpO2:  [95 %-100 %] 96 %  BP: (104-179)/(54-93) 127/81     Weight: 42.2 kg (93 lb)  Body mass index is 17.57 kg/m².    Physical Exam   Constitutional: She is oriented to person, place, and time. She appears well-developed and well-nourished. No distress.   Thin, tremulous   HENT:   Head: Normocephalic and atraumatic.   Mouth/Throat: No oropharyngeal exudate.   Eyes: Conjunctivae and EOM are normal. Pupils are equal, round, and reactive to light. No scleral icterus.   Neck: Normal range of motion. Neck supple. No tracheal deviation present. No thyromegaly present.   Cardiovascular: Regular rhythm, normal heart sounds and intact distal pulses. Tachycardia present.   No murmur heard.  Pulmonary/Chest: Effort normal and breath  sounds normal. No respiratory distress. She has no wheezes. She has no rales. She exhibits no tenderness.   Abdominal: Soft. Bowel sounds are normal. She exhibits no distension. There is no tenderness. There is no rebound and no guarding.   +ve Ileostomy   Musculoskeletal: Normal range of motion. She exhibits no edema or tenderness.   Lymphadenopathy:     She has no cervical adenopathy.   Neurological: She is alert and oriented to person, place, and time.   Skin: Skin is warm and dry. No rash noted. She is not diaphoretic. No erythema. No pallor.   Psychiatric: She has a normal mood and affect. Her behavior is normal. Judgment and thought content normal.         CRANIAL NERVES     CN III, IV, VI   Pupils are equal, round, and reactive to light.  Extraocular motions are normal.        Significant Labs:   CBC:   Recent Labs   Lab 01/07/19  1433   WBC 6.13   HGB 12.1   HCT 37.4        CMP:   Recent Labs   Lab 01/07/19  1433      K 3.4*      CO2 21*      BUN 7   CREATININE 0.7   CALCIUM 9.5   PROT 8.2   ALBUMIN 4.0   BILITOT 0.4   ALKPHOS 118   AST 20   ALT 18   ANIONGAP 11   EGFRNONAA >60.0     TSH:   Recent Labs   Lab 12/11/18  1248   TSH 1.526     Urine Culture: No results for input(s): LABURIN in the last 48 hours.  Urine Studies:   Recent Labs   Lab 01/07/19  1723   COLORU Yellow   APPEARANCEUA Clear   PHUR 6.0   SPECGRAV 1.005   PROTEINUA Negative   GLUCUA Negative   KETONESU Negative   BILIRUBINUA Negative   OCCULTUA 1+*   NITRITE Positive*   LEUKOCYTESUR 3+*   RBCUA 8*   WBCUA 35*   BACTERIA Many*   SQUAMEPITHEL 1       Significant Imaging: I have reviewed and interpreted all pertinent imaging results/findings within the past 24 hours.    Assessment/Plan:     * Sinus tachycardia    Multiple episodes of flushing, tachycardia, diaphoresis, anxiety over the course of the year. Being worked up by endocrinology for pheo vs carcinoid syndrome. I spoke with Dr. Landry who recommended patient  commence 24h urine metanephrine collection and follow up in clinic.   HR better following fluids in ED. Will keep on tele to monitor for arrhythmias. Consider ß  blockade if she develops another episode.   Check FT4, urine tox.  Hemodynamically stable.      Urinary tract infection with hematuria    Continue CTX, await speciation/sensitivities on culture (pending).   Hemodynamically stable, afebrile.    Follows w/ Urology OP.      Hypokalemia    Chronic, due to GI losses. On K+ supplementation. K 3.4 on admission. IVF w/ K rider ordered. Continue home supplement.      Appetite impaired    Continue dronabinol.      Generalized anxiety disorder    Stable. Contine Xanax (home medication) prn.     Crohn's disease of small intestine    Chronic, stable, s/p ileostomy. Output about the same.   Continue lomotil/loperamide prn.   Continue Oxy IR for pain relief, prn.        VTE Risk Mitigation (From admission, onward)        Ordered     IP VTE LOW RISK PATIENT  Once      01/07/19 1903     Place JEANNA hose  Until discontinued      01/07/19 1903     Place sequential compression device  Until discontinued      01/07/19 1903             Charlette Santana MD  Department of Hospital Medicine   [facility]

## 2019-01-08 NOTE — SUBJECTIVE & OBJECTIVE
Past Medical History:   Diagnosis Date    Abnormal Pap smear 2007    Abnormal Pap smear 5/26/2011    Anemia     Anxiety     Arthritis     C. difficile diarrhea     Crohn's disease     Depression 8/5/2017    Encounter for blood transfusion     Genital HSV     History of colposcopy with cervical biopsy 2007 and 7/2011 2007-LYLA I  and 7/2011- LYLA I    Hypertension     Kidney stone     Kidney stone     Recurrent UTI 4/3/2013    S/P ileostomy 7/9/2012    Sterilization 6/23/2012       Past Surgical History:   Procedure Laterality Date    ABDOMINAL SURGERY      APPENDECTOMY      BLADDER SURGERY      partial cystectomy due to fistula    CKC      COLON SURGERY      COLONOSCOPY      EGD (ESOPHAGOGASTRODUODENOSCOPY) N/A 9/6/2013    [facility]formed by [facility] ENDO (4TH FLR)    ESOPHAGOGASTRODUODENOSCOPY (EGD) N/A 10/14/2016    Performed by Janelle Mcpherson MD at [facility] ENDO (2ND FLR)    ESOPHAGOGASTRODUODENOSCOPY (EGD) N/A 10/23/2014    Performed by Nathanael Mitchell MD at [facility] ENDO (2ND FLR)    EXAM UNDER ANESTHESIA N/A 8/29/2013    Performed by Bob Parsons MD at [facility] OR 2ND FLR    EXAM UNDER ANESTHESIA N/A 1/18/2013    Performed by Bob Parsons MD at [facility] OR 2ND FLR    HYSTERECTOMY-ABDOMINAL-TOTAL (SHELLIE) N/A 4/16/2015    [facility] by [facility] at [facility] OR    ILEOSCOPY N/A 8/8/2017    Performed by Tommie Klein MD at [facility] ENDO (2ND FLR)    ILEOSCOPY N/A 10/14/2016    Performed by Janelle Mcpherson MD at [facility] ENDO (2ND FLR)    ILEOSCOPY N/A 9/25/2013    Performed by Emilio Cameron MD at [facility] ENDO (4TH FLR)    ILEOSTOMY      INCISION AND DRAINAGE, ABSCESS N/A 8/29/2013    Performed by Bob Parsons MD at [facility] OR 2ND FLR    [id number]-I-CATH Left 2/21/2017    Performed by Parish Sanchez Jr., MD at [facility] OR 2ND FLR    OOPHORECTOMY Right 04/16/2015    PORTACATH PLACEMENT  02/21/2017    REPAIR-BLADDER  4/16/2015    [facility] by [facility] at [facility] OR    SALPINGO-OOPHERECTOMY Right 4/16/2015    Performed by [facility] at [facility] OR    SIGMOIDOSCOPY, FLEXIBLE N/A 2/7/2014    Performed by [facility]o [facility]ell MD at [facility] ENDO (2ND FLR)    SKIN BIOPSY      SMALL INTESTINE SURGERY      TOTAL ABDOMINAL HYSTERECTOMY  04/16/2015    TOTAL COLECTOMY      TUBAL LIGATION  06/06/2012    UPPER GASTROINTESTINAL ENDOSCOPY         Review of patient's allergies indicates:   Allergen Reactions    Azathioprine sodium Other (See Comments)     Other reaction(s): pancreatitis  Other reaction(s): pancreatitis    Methotrexate analogues Other (See Comments)     leukopenia    Stelara [ustekinumab] Other (See Comments)     Multiple infections    Zofran [ondansetron hcl (pf)] Other (See Comments)     Per patient causes prolong QT    Vancomycin analogues Hives    Morphine Itching and Other (See Comments)     Other reaction(s): Itching    Bactrim [sulfamethoxazole-trimethoprim] Palpitations    Ciprofloxacin Palpitations       No current facility-administered medications on file prior to encounter.      Current Outpatient Medications on File Prior to Encounter   Medication Sig    ALPRAZolam (XANAX) 1 MG tablet Take 1 tablet (1 mg total) by mouth 2 (two) times daily. as needed for anxiety.    diphenoxylate-atropine 2.5-0.025 mg (LOMOTIL) 2.5-0.025 mg per tablet Take 1 tablet by mouth 4 (four) times daily as needed for Diarrhea.    dronabinol (MARINOL) 5 MG capsule Take 1 capsule (5 mg total) by mouth 2 (two) times daily before meals.    loperamide (IMODIUM) 2 mg capsule Take 2 mg by mouth daily as needed for Diarrhea.    magnesium 250 mg Tab Take by mouth once.    multivitamin (ONE DAILY MULTIVITAMIN) per tablet Take 1 tablet by mouth once daily.    potassium citrate 15 mEq TbSR Take 2 tablets by mouth 2 (two) times daily.    promethazine (PHENERGAN) 25 MG tablet Take 1 tablet (25 mg total) by mouth every 6 (six) hours as needed.    valACYclovir  (VALTREX) 500 MG tablet TK 1 T PO QD    butalbital-acetaminophen-caffeine -40 mg (FIORICET, ESGIC) -40 mg per tablet Take 1 tablet by mouth every 4 (four) hours as needed for Headaches.    clindamycin (CLINDESSE) 2 % vaginal cream PLACE VAGINALLY EVERY EVENING    flu vac [id number] 36mos up,PF, 60 mcg (15 mcg x 4)/0.5 mL Syrg inject into the muscle    oxyCODONE-acetaminophen (PERCOCET)  mg per tablet Take 1 tablet by mouth every 6 (six) hours as needed for Pain.    terconazole (TERAZOL 7) 0.4 % Crea INSERT ONE APPLICATORFUL VAGINALLY AT BEDTIME    zolpidem (AMBIEN) 10 mg Tab TAKE 1 TABLET BY MOUTH EVERY EVENING    [DISCONTINUED] biotin 1,000 mcg Chew Take 1 tablet by mouth once daily.    [DISCONTINUED] metroNIDAZOLE (FLAGYL) 500 MG tablet Take 1 tablet (500 mg total) by mouth every 8 (eight) hours.     Family History     Problem Relation (Age of Onset)    Cancer Father, Maternal Grandfather    Colon cancer Father    Crohn's disease Brother    Endometrial cancer Maternal Aunt    Hypertension Mother    Skin cancer Maternal Grandfather        Tobacco Use    Smoking status: Never Smoker    Smokeless tobacco: Never Used   Substance and Sexual Activity    Alcohol use: Yes     Alcohol/week: 0.0 oz     Comment: Patient only drinks wine not regular basis.    Drug use: No    Sexual activity: Yes     Partners: Male     Birth control/protection: Pill, Surgical, Condom     Comment: HYST     Review of Systems   Constitutional: Positive for diaphoresis and unexpected weight change (7lb weight loss). Negative for activity change, appetite change, chills, fatigue and fever.   Eyes: Negative for photophobia and visual disturbance.   Respiratory: Negative for apnea, cough, choking, shortness of breath, wheezing and stridor.    Cardiovascular: Positive for palpitations. Negative for chest pain and leg swelling.   Gastrointestinal: Negative for abdominal pain, blood in stool, constipation, diarrhea, nausea  and vomiting.   Endocrine: Negative for cold intolerance and heat intolerance.   Genitourinary: Positive for frequency. Negative for difficulty urinating.   Musculoskeletal: Negative for arthralgias and myalgias.   Skin: Negative for rash and wound.   Allergic/Immunologic: Negative for immunocompromised state.   Neurological: Negative for dizziness, weakness and headaches.   Psychiatric/Behavioral: Negative for agitation, behavioral problems and confusion.     Objective:     Vital Signs (Most Recent):  Temp: 99.6 °F (37.6 °C) (01/07/19 1829)  Pulse: 108 (01/07/19 1822)  Resp: 19 (01/07/19 1702)  BP: 127/81 (01/07/19 1822)  SpO2: 96 % (01/07/19 1822) Vital Signs (24h Range):  Temp:  [99.2 °F (37.3 °C)-100.1 °F (37.8 °C)] 99.6 °F (37.6 °C)  Pulse:  [100-150] 108  Resp:  [18-19] 19  SpO2:  [95 %-100 %] 96 %  BP: (104-179)/(54-93) 127/81     Weight: 42.2 kg (93 lb)  Body mass index is 17.57 kg/m².    Physical Exam   Constitutional: She is oriented to person, place, and time. She appears well-developed and well-nourished. No distress.   Thin, tremulous   HENT:   Head: Normocephalic and atraumatic.   Mouth/Throat: No oropharyngeal exudate.   Eyes: Conjunctivae and EOM are normal. Pupils are equal, round, and reactive to light. No scleral icterus.   Neck: Normal range of motion. Neck supple. No tracheal deviation present. No thyromegaly present.   Cardiovascular: Regular rhythm, normal heart sounds and intact distal pulses. Tachycardia present.   No murmur heard.  Pulmonary/Chest: Effort normal and breath sounds normal. No respiratory distress. She has no wheezes. She has no rales. She exhibits no tenderness.   Abdominal: Soft. Bowel sounds are normal. She exhibits no distension. There is no tenderness. There is no rebound and no guarding.   +ve Ileostomy   Musculoskeletal: Normal range of motion. She exhibits no edema or tenderness.   Lymphadenopathy:     She has no cervical adenopathy.   Neurological: She is alert and  oriented to person, place, and time.   Skin: Skin is warm and dry. No rash noted. She is not diaphoretic. No erythema. No pallor.   Psychiatric: She has a normal mood and affect. Her behavior is normal. Judgment and thought content normal.         CRANIAL NERVES     CN III, IV, VI   Pupils are equal, round, and reactive to light.  Extraocular motions are normal.        Significant Labs:   CBC:   Recent Labs   Lab 01/07/19  1433   WBC 6.13   HGB 12.1   HCT 37.4        CMP:   Recent Labs   Lab 01/07/19  1433      K 3.4*      CO2 21*      BUN 7   CREATININE 0.7   CALCIUM 9.5   PROT 8.2   ALBUMIN 4.0   BILITOT 0.4   ALKPHOS 118   AST 20   ALT 18   ANIONGAP 11   EGFRNONAA >60.0     TSH:   Recent Labs   Lab 12/11/18  1248   TSH 1.526     Urine Culture: No results for input(s): LABURIN in the last 48 hours.  Urine Studies:   Recent Labs   Lab 01/07/19  1723   COLORU Yellow   APPEARANCEUA Clear   PHUR 6.0   SPECGRAV 1.005   PROTEINUA Negative   GLUCUA Negative   KETONESU Negative   BILIRUBINUA Negative   OCCULTUA 1+*   NITRITE Positive*   LEUKOCYTESUR 3+*   RBCUA 8*   WBCUA 35*   BACTERIA Many*   SQUAMEPITHEL 1       Significant Imaging: I have reviewed and interpreted all pertinent imaging results/findings within the past 24 hours.

## 2019-01-08 NOTE — ED NOTES
Pt starting to have shakes and heart rate went back up to 126. Pt is flushed. Palms clammy. physician aware. Will continue to monitor.

## 2019-01-08 NOTE — HOSPITAL COURSE
In discussion with patient's endocrinologist (Dr. Landry), patient was encouraged to begin 24h urine metanephrines collection. No further evaluation was recommended while IP, and she was instructed to follow up OP. CTX was started for UTI and urine/blood cultures were drawn.

## 2019-01-08 NOTE — ASSESSMENT & PLAN NOTE
Continue CTX, await speciation/sensitivities on culture (pending).   Hemodynamically stable, afebrile.    Follows w/ Urology OP.

## 2019-01-08 NOTE — ASSESSMENT & PLAN NOTE
Multiple episodes of flushing, tachycardia, diaphoresis, anxiety over the course of the year. Being worked up by endocrinology for pheo vs carcinoid syndrome. I spoke with Dr. Landry who recommended patient commence 24h urine metanephrine collection and follow up in clinic.   HR better following fluids in ED. Will keep on tele to monitor for arrhythmias. Consider ß blockade if she develops another episode.   Check FT4, urine tox.  Hemodynamically stable.

## 2019-01-08 NOTE — HPI
Ms. Salgado is a 37 yo F with HTN, Crohn's disease (s/p total colectomy w/ end ileostomy (2012)) on Entyvio infusions, recurrent UTIs and osteopenia who presents to the ED with a chief complaint of palpitations. She reports abrupt onset of palpitations associated with tachycardia, facial flushing, and tremulousness 20 minutes prior to arrival. She was eating lunch with her daughter and mother when the symptoms started. She attempted treatment with xanax with minimal improvement. Symptoms were still present at time of evaluation. She has a prior hx of similar episodes for over a year now, which have been attributed to dehydration (2/2 malabsorption) and anxiety. Patient also notes weight loss, overall not feeling well today. She denies fever, chills, URI symptoms, nausea/vomiting, abdominal pain, changes in urination or stool output. She denies any precipitating stressors. She has been worked up for this recently by Endocrinology and Cardiology, with plasma catecholamines/metanephrines, as well as event recorders (9/18-->20 symptomatic transmissions all c/w sinus tachycardia; 4/18 also revealed sinus rhythm and sinus tachycardia). She is in the process of establishing with neuroendocrine for evaluation for carcinoid.     In the ED, an EKG showed sinus tachycardia, similar to previous episodes. A CXR was -ve for acute processes. Her CBC/CMP were notable only for mild hypokalemia. A UA was +ve for bacteria, nitrites. She denied dysuria, but endorsed frequency.     Upon chart review, patient was most recently treated with Augmentin, Bactrim and Keflex in December for UTI. Cultures have previously grown: 12/04 Klebsiella (R to ampicillin), 11/21 Klebsiella (R to ampicillin), 9/18 Enterococcus (R to tetracycline), 4/12 Enterobacter (pan sensitive), 2/02 ESBL (R to cephlosporins, FQs, ampicillin).

## 2019-01-08 NOTE — PLAN OF CARE
Problem: Adult Inpatient Plan of Care  Goal: Plan of Care Review  Outcome: Ongoing (interventions implemented as appropriate)  Pt free of falls/injuries throughout the shift. Bed locked, in lowest position, call bell within reach. Pt afebrile, pain assessed & treated. VSS, pt in no distress, will continue to monitor.

## 2019-01-08 NOTE — PROGRESS NOTES
Progress Note  Hospital Medicine    Admit Date: 1/7/2019  Length of Stay:  LOS: 1 day     SUBJECTIVE:         Follow-up For:  Sinus tachycardia    HPI/Interval history (See H&P for complete P,F,SHx) :     Telemetry with NSR. c/o dysuria and left flank pain yesterday. resolved now. urine cultures pending       Review of Systems: List if applicable  Review of Systems   Constitutional: Positive for diaphoresis and unexpected weight change (7lb weight loss). Negative for activity change, appetite change, chills, fatigue and fever.   Eyes: Negative for photophobia and visual disturbance.   Respiratory: Negative for apnea, cough, choking, shortness of breath, wheezing and stridor.    Cardiovascular: Positive for palpitations.(resolved)  Negative for chest pain and leg swelling.   Gastrointestinal: Negative for abdominal pain, blood in stool, constipation, diarrhea, nausea and vomiting.   Endocrine: Negative for cold intolerance and heat intolerance.   Genitourinary: Positive for frequency. Negative for difficulty urinating.   Musculoskeletal: Negative for arthralgias and myalgias.   Skin: Negative for rash and wound.   Allergic/Immunologic: Negative for immunocompromised state.   Neurological: Negative for dizziness, weakness and headaches.   Psychiatric/Behavioral: Negative for agitation, behavioral problems and confusion.       OBJECTIVE:     Vital Signs Range (Last 24H):  Temp:  [95.7 °F (35.4 °C)-100.1 °F (37.8 °C)]   Pulse:  []   Resp:  [10-19]   BP: ()/(54-93)   SpO2:  [95 %-100 %]     Physical Exam:  Physical Exam   Constitutional: She is oriented to person, place, and time. She appears well-developed and well-nourished. No distress.   Thin, tremulous   HENT:   Head: Normocephalic and atraumatic.   Mouth/Throat: No oropharyngeal exudate.   Eyes: Conjunctivae and EOM are normal. Pupils are equal, round, and reactive to light. No scleral icterus.   Neck: Normal range of motion. Neck supple. No tracheal  deviation present. No thyromegaly present.   Cardiovascular: Regular rhythm, normal heart sounds and intact distal pulses. Tachycardia present.   No murmur heard.  Pulmonary/Chest: Effort normal and breath sounds normal. No respiratory distress. She has no wheezes. She has no rales. She exhibits no tenderness.   Abdominal: Soft. Bowel sounds are normal. She exhibits no distension. There is no tenderness. There is no rebound and no guarding.   +ve Ileostomy   Musculoskeletal: Normal range of motion. She exhibits no edema or tenderness.   Lymphadenopathy:     She has no cervical adenopathy.   Neurological: She is alert and oriented to person, place, and time.   Skin: Skin is warm and dry. No rash noted. She is not diaphoretic. No erythema. No pallor.   Psychiatric: She has a normal mood and affect. Her behavior is normal. Judgment and thought content normal.        CN III, IV, VI   Pupils are equal, round, and reactive to light.  Extraocular motions are normal.                Medications:  Medication list was reviewed and changes noted under Assessment/Plan.      Current Facility-Administered Medications:     acetaminophen tablet 650 mg, 650 mg, Oral, Q8H PRN, Charlette Santana MD, 650 mg at 01/08/19 0644    ALPRAZolam tablet 1 mg, 1 mg, Oral, BID PRN, Charlette Santana MD, 1 mg at 01/08/19 0935    diphenoxylate-atropine 2.5-0.025 mg per tablet 1 tablet, 1 tablet, Oral, QID PRN, Charlette Santana MD    dronabinol capsule 5 mg, 5 mg, Oral, BID AC, Charlette Santana MD, 5 mg at 01/08/19 0715    loperamide capsule 2 mg, 2 mg, Oral, Daily PRN, Charlette Santana MD, 2 mg at 01/08/19 1238    magnesium oxide tablet 400 mg, 400 mg, Oral, Daily, Charlette Santana MD, 400 mg at 01/08/19 0930    oxyCODONE-acetaminophen  mg per tablet 1 tablet, 1 tablet, Oral, Q6H PRN, Charlette Santana MD, 1 tablet at 01/08/19 1238    potassium citrate SR tablet 30 mEq, 30  mEq, Oral, BID, Charlette Santana MD, 30 mEq at 01/08/19 0930    promethazine tablet 25 mg, 25 mg, Oral, Q6H PRN, Charlette Santana MD, 25 mg at 01/08/19 0936    sodium chloride 0.9% flush 5 mL, 5 mL, Intravenous, PRN, Charlette Santana MD    valACYclovir tablet 500 mg, 500 mg, Oral, Daily, Charlette Santana MD, 500 mg at 01/08/19 0930    zolpidem tablet 5 mg, 5 mg, Oral, Nightly PRN, Charlette Santana MD, 5 mg at 01/08/19 0042    acetaminophen, ALPRAZolam, diphenoxylate-atropine 2.5-0.025 mg, loperamide, oxyCODONE-acetaminophen, promethazine, sodium chloride 0.9%, zolpidem    Laboratory/Diagnostic Data:  Reviewed and noted in plan where applicable- Please see chart for full lab data.    Recent Labs   Lab 01/07/19  1433 01/08/19  0308   WBC 6.13 7.90   HGB 12.1 10.2*   HCT 37.4 32.1*    244       Recent Labs   Lab 01/07/19  1433 01/08/19  0308    141   K 3.4* 3.8    111*   CO2 21* 20*   BUN 7 4*   CREATININE 0.7 0.6    85   CALCIUM 9.5 8.9   MG  --  1.5*       Recent Labs   Lab 01/07/19  1433   ALKPHOS 118   ALT 18   AST 20   ALBUMIN 4.0   PROT 8.2   BILITOT 0.4        Microbiology labs for the last week  Microbiology Results (last 7 days)     Procedure Component Value Units Date/Time    Blood Culture #2 **CANNOT BE ORDERED STAT** [[id number]] Collected:  01/07/19 1717    Order Status:  Completed Specimen:  Blood from Peripheral, Wrist, Left Updated:  01/08/19 0115     Blood Culture, Routine No Growth to date    Blood Culture #1 **CANNOT BE ORDERED STAT** [[id number]] Collected:  01/07/19 1712    Order Status:  Completed Specimen:  Blood from Peripheral, Forearm, Left Updated:  01/08/19 0115     Blood Culture, Routine No Growth to date    Urine culture [[id number]] Collected:  01/07/19 1723    Order Status:  No result Specimen:  Urine Updated:  01/07/19 1814           Imaging Results          X-Ray Chest PA And Lateral (Final result)  Result time  "01/07/19 17:34:50    Final result by Suzi Martinez MD (01/07/19 17:34:50)                 Impression:      No acute cardiopulmonary abnormality.      Electronically signed by: Suzi Martinez  Date:    01/07/2019  Time:    17:34             Narrative:    EXAMINATION:  XR CHEST PA AND LATERAL    CLINICAL HISTORY:  Fever, unspecified    TECHNIQUE:  PA and lateral views of the chest were performed.    COMPARISON:  Multiple priors, most recent 12/11/2018    FINDINGS:  Stable positioning of a left chest wall port catheter.The lungs are clear.  No focal consolidation, pleural effusion or pneumothorax.    Normal cardiomediastinal contour.    No acute or aggressive osseous abnormality. Scoliosis.                                Estimated body mass index is 17.57 kg/m² as calculated from the following:    Height as of this encounter: 5' 1" (1.549 m).    Weight as of this encounter: 42.2 kg (93 lb).    I & O (Last 24H):    Intake/Output Summary (Last 24 hours) at 1/8/2019 1254  Last data filed at 1/8/2019 1200  Gross per 24 hour   Intake 2540 ml   Output --   Net 2540 ml       Estimated Creatinine Clearance: 86.4 mL/min (based on SCr of 0.6 mg/dL).    ASSESSMENT/PLAN:     Active Problems:      Active Hospital Problems    Diagnosis  POA    *Sinus tachycardia [R00.0]Multiple episodes of flushing, tachycardia, diaphoresis, anxiety over the course of the year. Being worked up by endocrinology for pheo vs carcinoid syndrome.Dr. Landry  recommended patient commence 24h urine metanephrine collection and follow up in clinic.   - Previous metanephrines, catecholamines, 5HIAA, chromogranin A levels unremarkable.  Catecholamine levels mildly above normal.  FT4,- normal   Hemodynamically stable.   Yes    Hypomagnesemia [E83.42]replaced   Unknown    Anemia [D64.9]Hb 10.2 normocytis. stable. monitgor   Unknown    Urinary tract infection with hematuria [N39.0, R31.9]/ pyelonerphritis - Continue CTX, await speciation/sensitivities on " culture (pending).   Follows w/ Urology OP.   Yes    Hypokalemia [E87.6]repalced   Yes    Appetite impaired [R63.0]on Dronabinol   Yes    Crohn's disease of small intestine [K50.00]    Chronic, stable, s/p ileostomy. Output about the same.   Continue lomotil/loperamide prn.   Continue Oxy IR for pain relief, prn. on entyvio      Yes     Chronic    Generalized anxiety disorder [F41.1]on Xanax PRN  Yes      Resolved Hospital Problems   No resolved problems to display.         Disposition- home    DVT prophylaxis addressed with:MARIA EUGENIA Kolb MD  Attending Staff Physician  [facility]  pager- [phone]  Spect[employer] - [phone]

## 2019-01-08 NOTE — PLAN OF CARE
Problem: Adult Inpatient Plan of Care  Goal: Plan of Care Review  Outcome: Ongoing (interventions implemented as appropriate)  Patient rested. Complained of nausea, anxiety, and pain. Administered PRN medication as needed. Patient ambulated to the restroom. Will continue to monitor.

## 2019-01-09 PROBLEM — R19.7 DIARRHEA: Status: ACTIVE | Noted: 2019-01-09

## 2019-01-09 PROBLEM — R63.4 WEIGHT LOSS: Status: ACTIVE | Noted: 2019-01-09

## 2019-01-09 LAB
ALBUMIN SERPL BCP-MCNC: 3.7 G/DL
ALP SERPL-CCNC: 110 U/L
ALT SERPL W/O P-5'-P-CCNC: 12 U/L
ANION GAP SERPL CALC-SCNC: 9 MMOL/L
AST SERPL-CCNC: 16 U/L
BASOPHILS # BLD AUTO: 0.04 K/UL
BASOPHILS NFR BLD: 0.8 %
BILIRUB SERPL-MCNC: 0.4 MG/DL
BUN SERPL-MCNC: 8 MG/DL
CALCIUM SERPL-MCNC: 9.7 MG/DL
CHLORIDE SERPL-SCNC: 106 MMOL/L
CO2 SERPL-SCNC: 25 MMOL/L
CREAT SERPL-MCNC: 0.7 MG/DL
DIFFERENTIAL METHOD: ABNORMAL
EOSINOPHIL # BLD AUTO: 0.1 K/UL
EOSINOPHIL NFR BLD: 1.7 %
ERYTHROCYTE [DISTWIDTH] IN BLOOD BY AUTOMATED COUNT: 15.5 %
EST. GFR  (AFRICAN AMERICAN): >60 ML/MIN/1.73 M^2
EST. GFR  (NON AFRICAN AMERICAN): >60 ML/MIN/1.73 M^2
GLUCOSE SERPL-MCNC: 104 MG/DL
HCT VFR BLD AUTO: 39.6 %
HGB BLD-MCNC: 12.6 G/DL
IMM GRANULOCYTES # BLD AUTO: 0 K/UL
IMM GRANULOCYTES NFR BLD AUTO: 0 %
LYMPHOCYTES # BLD AUTO: 2.3 K/UL
LYMPHOCYTES NFR BLD: 44.9 %
MAGNESIUM SERPL-MCNC: 2.2 MG/DL
MCH RBC QN AUTO: 27.8 PG
MCHC RBC AUTO-ENTMCNC: 31.8 G/DL
MCV RBC AUTO: 87 FL
MONOCYTES # BLD AUTO: 0.7 K/UL
MONOCYTES NFR BLD: 12.7 %
NEUTROPHILS # BLD AUTO: 2.1 K/UL
NEUTROPHILS NFR BLD: 39.9 %
NRBC BLD-RTO: 0 /100 WBC
PLATELET # BLD AUTO: 303 K/UL
PMV BLD AUTO: 10.1 FL
POTASSIUM SERPL-SCNC: 4.3 MMOL/L
PROT SERPL-MCNC: 7.8 G/DL
RBC # BLD AUTO: 4.54 M/UL
SODIUM SERPL-SCNC: 140 MMOL/L
WBC # BLD AUTO: 5.21 K/UL

## 2019-01-09 PROCEDURE — 63600175 PHARM REV CODE 636 W HCPCS: Performed by: HOSPITALIST

## 2019-01-09 PROCEDURE — 25000003 PHARM REV CODE 250: Performed by: HOSPITALIST

## 2019-01-09 PROCEDURE — 85025 COMPLETE CBC W/AUTO DIFF WBC: CPT

## 2019-01-09 PROCEDURE — 87045 FECES CULTURE AEROBIC BACT: CPT

## 2019-01-09 PROCEDURE — 87046 STOOL CULTR AEROBIC BACT EA: CPT | Mod: 59

## 2019-01-09 PROCEDURE — 87449 NOS EACH ORGANISM AG IA: CPT

## 2019-01-09 PROCEDURE — 83735 ASSAY OF MAGNESIUM: CPT

## 2019-01-09 PROCEDURE — 99233 SBSQ HOSP IP/OBS HIGH 50: CPT | Mod: ,,, | Performed by: HOSPITALIST

## 2019-01-09 PROCEDURE — 80053 COMPREHEN METABOLIC PANEL: CPT

## 2019-01-09 PROCEDURE — 36415 COLL VENOUS BLD VENIPUNCTURE: CPT

## 2019-01-09 PROCEDURE — 87427 SHIGA-LIKE TOXIN AG IA: CPT

## 2019-01-09 PROCEDURE — 89055 LEUKOCYTE ASSESSMENT FECAL: CPT

## 2019-01-09 PROCEDURE — 11000001 HC ACUTE MED/SURG PRIVATE ROOM

## 2019-01-09 PROCEDURE — 99233 PR SUBSEQUENT HOSPITAL CARE,LEVL III: ICD-10-PCS | Mod: ,,, | Performed by: HOSPITALIST

## 2019-01-09 RX ORDER — SODIUM CHLORIDE, SODIUM LACTATE, POTASSIUM CHLORIDE, CALCIUM CHLORIDE 600; 310; 30; 20 MG/100ML; MG/100ML; MG/100ML; MG/100ML
INJECTION, SOLUTION INTRAVENOUS CONTINUOUS
Status: DISCONTINUED | OUTPATIENT
Start: 2019-01-09 | End: 2019-01-16 | Stop reason: HOSPADM

## 2019-01-09 RX ORDER — CEFTRIAXONE 1 G/1
1 INJECTION, POWDER, FOR SOLUTION INTRAMUSCULAR; INTRAVENOUS
Status: DISCONTINUED | OUTPATIENT
Start: 2019-01-09 | End: 2019-01-10

## 2019-01-09 RX ADMIN — ALPRAZOLAM 1 MG: 0.5 TABLET ORAL at 09:01

## 2019-01-09 RX ADMIN — PROMETHAZINE HYDROCHLORIDE 25 MG: 25 TABLET ORAL at 08:01

## 2019-01-09 RX ADMIN — OXYCODONE HYDROCHLORIDE AND ACETAMINOPHEN 1 TABLET: 10; 325 TABLET ORAL at 03:01

## 2019-01-09 RX ADMIN — DRONABINOL 5 MG: 2.5 CAPSULE ORAL at 04:01

## 2019-01-09 RX ADMIN — LOPERAMIDE HYDROCHLORIDE 2 MG: 2 CAPSULE ORAL at 12:01

## 2019-01-09 RX ADMIN — CEFTRIAXONE SODIUM 1 G: 1 INJECTION, POWDER, FOR SOLUTION INTRAMUSCULAR; INTRAVENOUS at 08:01

## 2019-01-09 RX ADMIN — POTASSIUM CITRATE 30 MEQ: 10 TABLET ORAL at 08:01

## 2019-01-09 RX ADMIN — ALPRAZOLAM 1 MG: 0.5 TABLET ORAL at 08:01

## 2019-01-09 RX ADMIN — DIPHENOXYLATE HYDROCHLORIDE AND ATROPINE SULFATE 1 TABLET: 2.5; .025 TABLET ORAL at 03:01

## 2019-01-09 RX ADMIN — ACETAMINOPHEN 650 MG: 325 TABLET ORAL at 12:01

## 2019-01-09 RX ADMIN — ZOLPIDEM TARTRATE 5 MG: 5 TABLET ORAL at 09:01

## 2019-01-09 RX ADMIN — OXYCODONE HYDROCHLORIDE AND ACETAMINOPHEN 1 TABLET: 10; 325 TABLET ORAL at 10:01

## 2019-01-09 RX ADMIN — DRONABINOL 5 MG: 2.5 CAPSULE ORAL at 07:01

## 2019-01-09 RX ADMIN — MAGNESIUM OXIDE TAB 400 MG (241.3 MG ELEMENTAL MG) 400 MG: 400 (241.3 MG) TAB at 08:01

## 2019-01-09 RX ADMIN — POTASSIUM CITRATE 30 MEQ: 10 TABLET ORAL at 09:01

## 2019-01-09 RX ADMIN — VALACYCLOVIR HYDROCHLORIDE 500 MG: 500 TABLET, FILM COATED ORAL at 08:01

## 2019-01-09 RX ADMIN — SODIUM CHLORIDE, SODIUM LACTATE, POTASSIUM CHLORIDE, AND CALCIUM CHLORIDE: .6; .31; .03; .02 INJECTION, SOLUTION INTRAVENOUS at 04:01

## 2019-01-09 NOTE — PLAN OF CARE
Problem: Adult Inpatient Plan of Care  Goal: Plan of Care Review  Outcome: Ongoing (interventions implemented as appropriate)  Pt free of falls/injuries throughout the shift. Bed locked, in lowest position, call bell within reach. Pt afebrile, pain assessed & treated. VSS, pt in no distress will continue to monitor.

## 2019-01-09 NOTE — PROGRESS NOTES
Progress Note  Hospital Medicine    Admit Date: 1/7/2019  Length of Stay:  LOS: 2 days     SUBJECTIVE:         Follow-up For:  Sinus tachycardia    HPI/Interval history (See H&P for complete P,F,SHx) :     Had an episode of flushing with tachycardia  117 with SBP 150s this AM Telemetry with sinus tachycardia. c/o dysuria and left flank resolved now. urine cultures -gram negative rods. tearful and admits to depression psychiatry consulted for possible panic attacks/ depression . started on RL hydration for increased ostomy output - stool for Clost diff assay pending       Review of Systems: List if applicable  Review of Systems   Constitutional: Positive for diaphoresis and unexpected weight change (7lb weight loss). Negative for activity change, appetite change, chills, fatigue and fever.   Eyes: Negative for photophobia and visual disturbance.   Respiratory: Negative for apnea, cough, choking, shortness of breath, wheezing and stridor.    Cardiovascular: Positive for palpitations.(resolved)  Negative for chest pain and leg swelling.   Gastrointestinal: Negative for abdominal pain, blood in stool, constipation, diarrhea, nausea and vomiting.   Endocrine: Negative for cold intolerance and heat intolerance.   Genitourinary: Positive for frequency. Negative for difficulty urinating.   Musculoskeletal: Negative for arthralgias and myalgias.   Skin: Negative for rash and wound.   Allergic/Immunologic: Negative for immunocompromised state.   Neurological: Negative for dizziness, weakness and headaches.   Psychiatric/Behavioral: Negative for agitation, behavioral problems and confusion.       OBJECTIVE:     Vital Signs Range (Last 24H):  Temp:  [98.7 °F (37.1 °C)-99.4 °F (37.4 °C)]   Pulse:  []   Resp:  [9-16]   BP: ()/()   SpO2:  [95 %-99 %]     Physical Exam:  Physical Exam   Constitutional: She is oriented to person, place, and time. She appears well-developed and well-nourished. No distress.   Thin,  tremulous   HENT:   Head: Normocephalic and atraumatic.   Mouth/Throat: No oropharyngeal exudate.   Eyes: Conjunctivae and EOM are normal. Pupils are equal, round, and reactive to light. No scleral icterus.   Neck: Normal range of motion. Neck supple. No tracheal deviation present. No thyromegaly present.   Cardiovascular: Regular rhythm, normal heart sounds and intact distal pulses. Tachycardia present.   No murmur heard.  Pulmonary/Chest: Effort normal and breath sounds normal. No respiratory distress. She has no wheezes. She has no rales. She exhibits no tenderness.   Abdominal: Soft. Bowel sounds are normal. She exhibits no distension. There is no tenderness. There is no rebound and no guarding.   +ve Ileostomy   Musculoskeletal: Normal range of motion. She exhibits no edema or tenderness.   Lymphadenopathy:     She has no cervical adenopathy.   Neurological: She is alert and oriented to person, place, and time.   Skin: Skin is warm and dry. No rash noted. She is not diaphoretic. No erythema. No pallor.   Psychiatric: She has a normal mood and affect. Her behavior is normal. Judgment and thought content normal.        CN III, IV, VI   Pupils are equal, round, and reactive to light.  Extraocular motions are normal.                Medications:  Medication list was reviewed and changes noted under Assessment/Plan.      Current Facility-Administered Medications:     acetaminophen tablet 650 mg, 650 mg, Oral, Q8H PRN, Charlette Santana MD, 650 mg at 01/09/19 1236    ALPRAZolam tablet 1 mg, 1 mg, Oral, BID PRN, Charlette Santana MD, 1 mg at 01/09/19 0813    cefTRIAXone injection 1 g, 1 g, Intravenous, Q24H, Se Kolb MD, 1 g at 01/09/19 0815    diphenoxylate-atropine 2.5-0.025 mg per tablet 1 tablet, 1 tablet, Oral, QID PRN, Charlette Santana MD, 1 tablet at 01/09/19 1513    dronabinol capsule 5 mg, 5 mg, Oral, BID AC, Charlette Santana MD, 5 mg at 01/09/19 3803     lactated ringers infusion, , Intravenous, Continuous, Se Kolb MD, Last Rate: 75 mL/hr at 01/09/19 1637    loperamide capsule 2 mg, 2 mg, Oral, Daily PRN, Charlette Santana MD, 2 mg at 01/09/19 1237    magnesium oxide tablet 400 mg, 400 mg, Oral, Daily, Charlette Santana MD, 400 mg at 01/09/19 0815    oxyCODONE-acetaminophen  mg per tablet 1 tablet, 1 tablet, Oral, Q6H PRN, Charlette Santana MD, 1 tablet at 01/09/19 0354    potassium citrate SR tablet 30 mEq, 30 mEq, Oral, BID, Charlette Santana MD, 30 mEq at 01/09/19 0815    promethazine tablet 25 mg, 25 mg, Oral, Q6H PRN, Charlette Santana MD, 25 mg at 01/09/19 0814    sodium chloride 0.9% flush 5 mL, 5 mL, Intravenous, PRN, Charlette Santana MD    valACYclovir tablet 500 mg, 500 mg, Oral, Daily, Charlette Santana MD, 500 mg at 01/09/19 0815    zolpidem tablet 5 mg, 5 mg, Oral, Nightly PRN, Charlette Santana MD, 5 mg at 01/08/19 2058    acetaminophen, ALPRAZolam, diphenoxylate-atropine 2.5-0.025 mg, loperamide, oxyCODONE-acetaminophen, promethazine, sodium chloride 0.9%, zolpidem    Laboratory/Diagnostic Data:  Reviewed and noted in plan where applicable- Please see chart for full lab data.    Recent Labs   Lab 01/07/19  1433 01/08/19  0308 01/09/19  0706   WBC 6.13 7.90 5.21   HGB 12.1 10.2* 12.6   HCT 37.4 32.1* 39.6    244 303       Recent Labs   Lab 01/07/19  1433 01/08/19  0308 01/09/19  0706    141 140   K 3.4* 3.8 4.3    111* 106   CO2 21* 20* 25   BUN 7 4* 8   CREATININE 0.7 0.6 0.7    85 104   CALCIUM 9.5 8.9 9.7   MG  --  1.5* 2.2       Recent Labs   Lab 01/07/19  1433 01/09/19  0706   ALKPHOS 118 110   ALT 18 12   AST 20 16   ALBUMIN 4.0 3.7   PROT 8.2 7.8   BILITOT 0.4 0.4        Microbiology labs for the last week  Microbiology Results (last 7 days)     Procedure Component Value Units Date/Time    Clostridium difficile EIA [[id number]]      "Order Status:  No result Specimen:  Stool     Stool culture [[id number]]     Order Status:  No result Specimen:  Stool     Urine culture [[id number]] Collected:  01/07/19 1723    Order Status:  Completed Specimen:  Urine Updated:  01/09/19 1039     Urine Culture, Routine --     GRAM NEGATIVE LULU  >100,000 cfu/ml  Identification and susceptibility pending      Narrative:       add on test urine toxicology per dr nohemy marks order #[id number]   01/07/2019  22:58     Blood Culture #2 **CANNOT BE ORDERED STAT** [[id number]] Collected:  01/07/19 1717    Order Status:  Completed Specimen:  Blood from Peripheral, Wrist, Left Updated:  01/08/19 1812     Blood Culture, Routine No Growth to date     Blood Culture, Routine No Growth to date    Blood Culture #1 **CANNOT BE ORDERED STAT** [[id number]] Collected:  01/07/19 1712    Order Status:  Completed Specimen:  Blood from Peripheral, Forearm, Left Updated:  01/08/19 1812     Blood Culture, Routine No Growth to date     Blood Culture, Routine No Growth to date           Imaging Results          X-Ray Chest PA And Lateral (Final result)  Result time 01/07/19 17:34:50    Final result by Suzi Martinez MD (01/07/19 17:34:50)                 Impression:      No acute cardiopulmonary abnormality.      Electronically signed by: Suzi Martinez  Date:    01/07/2019  Time:    17:34             Narrative:    EXAMINATION:  XR CHEST PA AND LATERAL    CLINICAL HISTORY:  Fever, unspecified    TECHNIQUE:  PA and lateral views of the chest were performed.    COMPARISON:  Multiple priors, most recent 12/11/2018    FINDINGS:  Stable positioning of a left chest wall port catheter.The lungs are clear.  No focal consolidation, pleural effusion or pneumothorax.    Normal cardiomediastinal contour.    No acute or aggressive osseous abnormality. Scoliosis.                                Estimated body mass index is 17.57 kg/m² as calculated from the following:    Height as of this encounter: 5' 1" " (1.549 m).    Weight as of this encounter: 42.2 kg (93 lb).    I & O (Last 24H):    Intake/Output Summary (Last 24 hours) at 1/9/2019 1654  Last data filed at 1/9/2019 1300  Gross per 24 hour   Intake 840 ml   Output 400 ml   Net 440 ml       Estimated Creatinine Clearance: 74 mL/min (based on SCr of 0.7 mg/dL).    ASSESSMENT/PLAN:     Active Problems:      Active Hospital Problems    Diagnosis  POA    *Sinus tachycardia [R00.0]Multiple episodes of flushing, tachycardia, diaphoresis, anxiety over the course of the year. Being worked up by endocrinology for pheo vs carcinoid syndrome.Dr. Landry  recommended patient commence 24h urine metanephrine collection and follow up in clinic.   - Previous metanephrines, catecholamines, 5HIAA, chromogranin A levels unremarkable. 24hr urine metanephrines pending. not taking metoprolol as per endocrine advice   Catecholamine levels mildly above normal.  FT4,- normal   Hemodynamically stable.         Generalized anxiety disorder [F41.1] r/ o Panic attack - on Xanax PRN -   Had an episode of flushing with tachycardia  117 with SBP 150s this AM Telemetry with sinus tachycardia. c/o dysuria and left flank resolved now. urine cultures -gram negative rods. tearful and admits to depression psychiatry consulted for possible panic attacks/ depression . admits stressors at home. previosly on celexa but discontinued dut prolonged QT. no psyc follow up since  1year    Weight loss - 17 lb in 1 year. Nutrition consulted.o    Diarrhea/ increased ostomy output -  started on RL hydration for increased ostomy output - stool for Clost diff assay pending     Yes    Hypomagnesemia [E83.42]replaced   Unknown    Anemia [D64.9]Hb 10.2 normocytis. stable. monitgor   Unknown    Urinary tract infection with hematuria [N39.0, R31.9]/ pyelonerphritis - Continue CTX, await speciation/sensitivities on culture (pending).   Follows w/ Urology OP.   Yes    Hypokalemia [E87.6]repalced   Yes    Appetite  impaired [R63.0]on Dronabinol . as above   Yes    Crohn's disease of small intestine [K50.00]    Chronic, stable, s/p ileostomy. Output about the same.   Continue lomotil/loperamide prn.   Continue Oxy IR for pain relief, prn. on entyvio      Yes     Chronic      Yes      Resolved Hospital Problems   No resolved problems to display.         Disposition- home    DVT prophylaxis addressed with:MARIA EUGENIA Kolb MD  Attending Staff Physician  [facility]  pager- [phone]  Spectral[employer] - [phone]

## 2019-01-09 NOTE — PLAN OF CARE
Problem: Adult Inpatient Plan of Care  Goal: Plan of Care Review  Outcome: Ongoing (interventions implemented as appropriate)  Patient anxious in the morning. Complained of headache. Administered PRN medication as needed. Will continue to monitor.

## 2019-01-10 PROBLEM — E44.0 MODERATE MALNUTRITION: Status: ACTIVE | Noted: 2019-01-10

## 2019-01-10 LAB
ALBUMIN SERPL BCP-MCNC: 3.4 G/DL
ALP SERPL-CCNC: 97 U/L
ALT SERPL W/O P-5'-P-CCNC: 11 U/L
ANION GAP SERPL CALC-SCNC: 9 MMOL/L
AST SERPL-CCNC: 16 U/L
BACTERIA UR CULT: NORMAL
BASOPHILS # BLD AUTO: 0.03 K/UL
BASOPHILS NFR BLD: 0.6 %
BILIRUB SERPL-MCNC: 0.4 MG/DL
BUN SERPL-MCNC: 9 MG/DL
C DIFF GDH STL QL: NEGATIVE
C DIFF TOX A+B STL QL IA: NEGATIVE
CALCIUM SERPL-MCNC: 9.5 MG/DL
CHLORIDE SERPL-SCNC: 105 MMOL/L
CO2 SERPL-SCNC: 26 MMOL/L
CREAT SERPL-MCNC: 0.7 MG/DL
DIFFERENTIAL METHOD: ABNORMAL
EOSINOPHIL # BLD AUTO: 0.1 K/UL
EOSINOPHIL NFR BLD: 2 %
ERYTHROCYTE [DISTWIDTH] IN BLOOD BY AUTOMATED COUNT: 15.2 %
EST. GFR  (AFRICAN AMERICAN): >60 ML/MIN/1.73 M^2
EST. GFR  (NON AFRICAN AMERICAN): >60 ML/MIN/1.73 M^2
GLUCOSE SERPL-MCNC: 98 MG/DL
HCT VFR BLD AUTO: 37.1 %
HGB BLD-MCNC: 11.9 G/DL
IMM GRANULOCYTES # BLD AUTO: 0 K/UL
IMM GRANULOCYTES NFR BLD AUTO: 0 %
LYMPHOCYTES # BLD AUTO: 2.6 K/UL
LYMPHOCYTES NFR BLD: 51.5 %
MAGNESIUM SERPL-MCNC: 1.9 MG/DL
MCH RBC QN AUTO: 27.9 PG
MCHC RBC AUTO-ENTMCNC: 32.1 G/DL
MCV RBC AUTO: 87 FL
MONOCYTES # BLD AUTO: 0.6 K/UL
MONOCYTES NFR BLD: 12.7 %
NEUTROPHILS # BLD AUTO: 1.6 K/UL
NEUTROPHILS NFR BLD: 33.2 %
NRBC BLD-RTO: 0 /100 WBC
PLATELET # BLD AUTO: 296 K/UL
PMV BLD AUTO: 9.7 FL
POTASSIUM SERPL-SCNC: 4.1 MMOL/L
PROT SERPL-MCNC: 7.1 G/DL
RBC # BLD AUTO: 4.27 M/UL
SODIUM SERPL-SCNC: 140 MMOL/L
WBC # BLD AUTO: 4.95 K/UL
WBC #/AREA STL HPF: NORMAL /[HPF]

## 2019-01-10 PROCEDURE — 11000001 HC ACUTE MED/SURG PRIVATE ROOM

## 2019-01-10 PROCEDURE — 99233 PR SUBSEQUENT HOSPITAL CARE,LEVL III: ICD-10-PCS | Mod: ,,, | Performed by: HOSPITALIST

## 2019-01-10 PROCEDURE — 85025 COMPLETE CBC W/AUTO DIFF WBC: CPT

## 2019-01-10 PROCEDURE — 63600175 PHARM REV CODE 636 W HCPCS: Performed by: HOSPITALIST

## 2019-01-10 PROCEDURE — 99233 SBSQ HOSP IP/OBS HIGH 50: CPT | Mod: ,,, | Performed by: HOSPITALIST

## 2019-01-10 PROCEDURE — 83735 ASSAY OF MAGNESIUM: CPT

## 2019-01-10 PROCEDURE — 25000003 PHARM REV CODE 250: Performed by: HOSPITALIST

## 2019-01-10 PROCEDURE — 36415 COLL VENOUS BLD VENIPUNCTURE: CPT

## 2019-01-10 PROCEDURE — 80053 COMPREHEN METABOLIC PANEL: CPT

## 2019-01-10 PROCEDURE — 99222 1ST HOSP IP/OBS MODERATE 55: CPT | Mod: AF,HB,, | Performed by: PSYCHIATRY & NEUROLOGY

## 2019-01-10 PROCEDURE — 99222 PR INITIAL HOSPITAL CARE,LEVL II: ICD-10-PCS | Mod: AF,HB,, | Performed by: PSYCHIATRY & NEUROLOGY

## 2019-01-10 RX ORDER — CLONAZEPAM 0.5 MG/1
0.5 TABLET ORAL 2 TIMES DAILY PRN
Status: DISCONTINUED | OUTPATIENT
Start: 2019-01-10 | End: 2019-01-12

## 2019-01-10 RX ADMIN — DRONABINOL 5 MG: 2.5 CAPSULE ORAL at 05:01

## 2019-01-10 RX ADMIN — ALPRAZOLAM 1 MG: 0.5 TABLET ORAL at 10:01

## 2019-01-10 RX ADMIN — OXYCODONE HYDROCHLORIDE AND ACETAMINOPHEN 1 TABLET: 10; 325 TABLET ORAL at 09:01

## 2019-01-10 RX ADMIN — VALACYCLOVIR HYDROCHLORIDE 500 MG: 500 TABLET, FILM COATED ORAL at 08:01

## 2019-01-10 RX ADMIN — OXYCODONE HYDROCHLORIDE AND ACETAMINOPHEN 1 TABLET: 10; 325 TABLET ORAL at 02:01

## 2019-01-10 RX ADMIN — MAGNESIUM OXIDE TAB 400 MG (241.3 MG ELEMENTAL MG) 400 MG: 400 (241.3 MG) TAB at 08:01

## 2019-01-10 RX ADMIN — ZOLPIDEM TARTRATE 5 MG: 5 TABLET ORAL at 11:01

## 2019-01-10 RX ADMIN — CLONAZEPAM 0.5 MG: 0.5 TABLET ORAL at 06:01

## 2019-01-10 RX ADMIN — PROMETHAZINE HYDROCHLORIDE 25 MG: 25 TABLET ORAL at 09:01

## 2019-01-10 RX ADMIN — ACETAMINOPHEN 650 MG: 325 TABLET ORAL at 06:01

## 2019-01-10 RX ADMIN — SODIUM CHLORIDE, SODIUM LACTATE, POTASSIUM CHLORIDE, AND CALCIUM CHLORIDE: .6; .31; .03; .02 INJECTION, SOLUTION INTRAVENOUS at 05:01

## 2019-01-10 RX ADMIN — PROMETHAZINE HYDROCHLORIDE 25 MG: 25 TABLET ORAL at 01:01

## 2019-01-10 RX ADMIN — CEFTRIAXONE SODIUM 1 G: 1 INJECTION, POWDER, FOR SOLUTION INTRAMUSCULAR; INTRAVENOUS at 08:01

## 2019-01-10 RX ADMIN — POTASSIUM CITRATE 30 MEQ: 10 TABLET ORAL at 08:01

## 2019-01-10 RX ADMIN — DRONABINOL 5 MG: 2.5 CAPSULE ORAL at 04:01

## 2019-01-10 RX ADMIN — SODIUM CHLORIDE, SODIUM LACTATE, POTASSIUM CHLORIDE, AND CALCIUM CHLORIDE: .6; .31; .03; .02 INJECTION, SOLUTION INTRAVENOUS at 04:01

## 2019-01-10 RX ADMIN — LOPERAMIDE HYDROCHLORIDE 2 MG: 2 CAPSULE ORAL at 04:01
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
## 2019-01-10 NOTE — SUBJECTIVE & OBJECTIVE
Patient History           Medical as of 1/9/2019     Past Medical History     Diagnosis Date Comments Source    Abnormal Pap smear 2007 -- Provider    Abnormal Pap smear 5/26/2011 -- Provider    Anemia -- -- Provider    Anxiety -- -- Provider    Arthritis -- -- Provider    C. difficile diarrhea -- -- Provider    Crohn's disease -- -- Provider    Depression 8/5/2017 -- Provider    Encounter for blood transfusion -- -- Provider    Genital HSV -- -- Provider    History of colposcopy with cervical biopsy 2007 and 7/2011 2007-LYLA I  and 7/2011- LYLA I Provider    Hypertension -- -- Provider    Kidney stone -- -- Provider    Kidney stone -- -- Provider    Recurrent UTI 4/3/2013 -- Provider    S/P ileostomy 7/9/2012 -- Provider    Sterilization 6/23/2012 -- Provider          Pertinent Negatives     Diagnosis Date Noted Comments Source    Basal cell carcinoma 11/20/2015 -- Provider    Melanoma 11/20/2015 -- Provider    Squamous cell carcinoma 11/20/2015 -- Provider    Transfusion reaction 01/02/2015 -- Provider                  Surgical as of 1/9/2019     Past Surgical History     Procedure Laterality Date Comments Source    TOTAL COLECTOMY -- -- -- Provider    ILEOSTOMY -- -- -- Provider    COLON SURGERY -- -- -- Provider    UPPER GASTROINTESTINAL ENDOSCOPY -- -- -- Provider    SMALL INTESTINE SURGERY -- -- -- Provider    COLONOSCOPY -- -- -- Provider    TUBAL LIGATION -- 06/06/2012 -- Provider    CKC [Other] -- -- -- Provider    APPENDECTOMY -- -- -- Provider    BLADDER SURGERY -- -- partial cystectomy due to fistula Provider    SKIN BIOPSY -- -- -- Provider    ABDOMINAL SURGERY -- -- -- Provider    OOPHORECTOMY Right 04/16/2015 -- Provider    PORTACATH PLACEMENT -- 02/21/2017 -- Provider    TOTAL ABDOMINAL HYSTERECTOMY -- 04/16/2015 -- Provider                  Family as of 1/9/2019     Problem Relation Name Age of Onset Comments Source    Colon cancer Father -- -- -- Provider    Cancer Father -- -- colon cancer  Provider    Hypertension Mother -- -- -- Provider    Cancer Maternal Grandfather -- -- esopghal  Provider    Skin cancer Maternal Grandfather -- -- -- Provider    Endometrial cancer Maternal Aunt -- -- -- Provider    Crohn's disease Brother -- -- -- Provider    Breast cancer Neg Hx -- -- -- Provider    Ovarian cancer Neg Hx -- -- -- Provider            Tobacco Use as of 1/9/2019     Smoking Status Smoking Start Date Smoking Quit Date Packs/Day Years Used    Never Smoker -- -- -- --    Types Comments Smokeless Tobacco Status Smokeless Tobacco Quit Date Source    -- -- Never Used -- Provider            Alcohol Use as of 1/9/2019     Alcohol Use Drinks/Week Alcohol/Week Comments Source    Yes 0 Standard drinks or equivalent 0.0 oz Patient only drinks wine not regular basis. Provider    Frequency Standard Drinks Binge Drinking Source      -- -- -- Provider             Drug Use as of 1/9/2019     Drug Use Types Frequency Comments Source    No  Mescaline -- -- Provider            Sexual Activity as of 1/9/2019     Sexually Active Birth Control Partners Comments Source    Yes Pill, Surgical, Condom Male HYST Provider            Activities of Daily Living as of 1/9/2019     Activities of Daily Living Question Response Comments Source    Are you pregnant or think you may be? Not Asked -- Provider    Breast-feeding Not Asked -- Provider            Social Documentation as of 1/9/2019    None           Occupational as of 1/9/2019     Occupation Employer Comments Source    -- [employer] -- Provider            Socioeconomic as of 1/9/2019     Marital Status Spouse Name Number of Children Years Education Education Level Preferred Language Ethnicity Race Source    Single -- -- -- -- English  () or [place]an Native White Provider    Financial Resource Strain Food Insecurity: Worry Food Insecurity: Inability Transportation Needs: Medical Transportation Needs: Non-medical       -- -- -- -- --              Pertinent History     Question Response Comments    Lives with -- --    Place in Birth Order -- --    Lives in -- --    Number of Siblings -- --    Raised by -- --    Legal Involvement -- --    Childhood Trauma -- --    Criminal History of -- --    Financial Status -- --    Highest Level of Education -- --    Does patient have access to a firearm? -- --     Service -- --    Primary Leisure Activity -- --    Spirituality -- --        Past Medical History:   Diagnosis Date    Abnormal Pap smear 2007    Abnormal Pap smear 5/26/2011    Anemia     Anxiety     Arthritis     C. difficile diarrhea     Crohn's disease     Depression 8/5/2017    Encounter for blood transfusion     Genital HSV     History of colposcopy with cervical biopsy 2007 and 7/2011 2007-LYLA I  and 7/2011- LYLA I    Hypertension     Kidney stone     Kidney stone     Recurrent UTI 4/3/2013    S/P ileostomy 7/9/2012    Sterilization 6/23/2012     Past Surgical History:   Procedure Laterality Date    ABDOMINAL SURGERY      APPENDECTOMY      BLADDER SURGERY      partial cystectomy due to fistula    CKC      COLON SURGERY      COLONOSCOPY      EGD (ESOPHAGOGASTRODUODENOSCOPY) N/A 9/6/2013    Performed by [facility] at [facility] END[facility] (4TH FLR)    ESOPHAGOGASTRODUODENOSCOPY (EGD) N/A 10/14/2016    Performed by Janelle Mcpherson [facility] at [facility] ENDO (2ND FLR)    ESOPHAGOGASTRODUODENOSCOPY (EGD) N/A 10/23/2014    Performed by Nathanael Mitchell MD at [facility] ENDO (2ND FLR)    EXAM UNDER ANESTHESIA N/A 8/29/2013    Performed by Bob [facility]ons [facility] at [facility] OR 2ND FLR    EXAM UNDER ANESTHESIA N/A 1/18/2013    Performed by [facility] at [facility] OR 2ND FLR    HYSTERECTOMY-ABDOMINAL-TOTAL (SHELLIE) N/A 4/16/2015    [facility]    ILEOSCOPY N/A 8/8/2017    Performed by Tommie Klein MD at [facility] ENDO (2ND FLR)    ILEOSCOPY N/A 10/14/2016    Performed by Janelle [facility]pherson MD at [facility] ENDO  (2ND FLR)    ILEOSCOPY N/A 9/25/2013    Performed by [facility] at [facility] ENDO (4TH FLR)    ILEOSTOMY      INCISION AND DRAINAGE, ABSCESS N/A 8/29/2013    Performed by Bob Parsons MD at [facility] OR 2ND FLR    [id number]-G-CATH Left 2/21/2017    Performed by Parish Sanchez Jr., MD at [facility] OR 2ND FLR    OOPHORECTOMY Right 04/16/2015    PORTACATH PLACEMENT  02/21/2017    REPAIR-BLADDER  4/16/2015    [facility] at [facility] OR    SALPINGO-OOPHERECTOMY Right 4/16/2015    Performed by [facility] at [facility] OR    SIGMOIDOSCOPY, FLEXIBLE N/A 2/7/2014    Performed by Erasto Sewell MD at [facility] ENDO (2ND FLR)    SKIN BIOPSY      SMALL INTESTINE SURGERY      TOTAL ABDOMINAL HYSTERECTOMY  04/16/2015    TOTAL COLECTOMY      TUBAL LIGATION  06/06/2012    UPPER GASTROINTESTINAL ENDOSCOPY       Family History     Problem Relation (Age of Onset)    Cancer Father, Maternal Grandfather    Colon cancer Father    Crohn's disease Brother    Endometrial cancer Maternal Aunt    Hypertension Mother    Skin cancer Maternal Grandfather        Tobacco Use    Smoking status: Never Smoker    Smokeless tobacco: Never Used   Substance and Sexual Activity    Alcohol use: Yes     Alcohol/week: 0.0 oz     Comment: Patient only drinks wine not regular basis.    Drug use: No    Sexual activity: Yes     Partners: Male     Birth control/protection: Pill, Surgical, Condom     Comment: HYST     Review of patient's allergies indicates:   Allergen Reactions    Azathioprine sodium Other (See Comments)     Other reaction(s): pancreatitis  Other reaction(s): pancreatitis    Methotrexate analogues Other (See Comments)     leukopenia    Stelara [ustekinumab] Other (See Comments)     Multiple infections    Zofran [ondansetron hcl (pf)] Other (See Comments)     Per patient causes prolong QT    Vancomycin analogues Hives    Morphine Itching and Other (See Comments)     Other reaction(s):  Itching    Bactrim [sulfamethoxazole-trimethoprim] Palpitations    Ciprofloxacin Palpitations       No current facility-administered medications on file prior to encounter.      Current Outpatient Medications on File Prior to Encounter   Medication Sig    ALPRAZolam (XANAX) 1 MG tablet Take 1 tablet (1 mg total) by mouth 2 (two) times daily. as needed for anxiety.    diphenoxylate-atropine 2.5-0.025 mg (LOMOTIL) 2.5-0.025 mg per tablet Take 1 tablet by mouth 4 (four) times daily as needed for Diarrhea.    dronabinol (MARINOL) 5 MG capsule Take 1 capsule (5 mg total) by mouth 2 (two) times daily before meals.    loperamide (IMODIUM) 2 mg capsule Take 2 mg by mouth daily as needed for Diarrhea.    magnesium 250 mg Tab Take by mouth once.    multivitamin (ONE DAILY MULTIVITAMIN) per tablet Take 1 tablet by mouth once daily.    potassium citrate 15 mEq TbSR Take 2 tablets by mouth 2 (two) times daily.    promethazine (PHENERGAN) 25 MG tablet Take 1 tablet (25 mg total) by mouth every 6 (six) hours as needed.    valACYclovir (VALTREX) 500 MG tablet TK 1 T PO QD    butalbital-acetaminophen-caffeine -40 mg (FIORICET, ESGIC) -40 mg per tablet Take 1 tablet by mouth every 4 (four) hours as needed for Headaches.    clindamycin (CLINDESSE) 2 % vaginal cream PLACE VAGINALLY EVERY EVENING    flu vac [id number] 36mos up,PF, 60 mcg (15 mcg x 4)/0.5 mL Syrg inject into the muscle    oxyCODONE-acetaminophen (PERCOCET)  mg per tablet Take 1 tablet by mouth every 6 (six) hours as needed for Pain.    terconazole (TERAZOL 7) 0.4 % Crea INSERT ONE APPLICATORFUL VAGINALLY AT BEDTIME    zolpidem (AMBIEN) 10 mg Tab TAKE 1 TABLET BY MOUTH EVERY EVENING     Psychotherapeutics (From admission, onward)    Start     Stop Route Frequency Ordered    01/07/19 2247  ALPRAZolam tablet 1 mg      -- Oral 2 times daily PRN 01/07/19 2147 01/07/19 2217  zolpidem tablet 5 mg      -- Oral Nightly PRN 01/07/19 2117     "    Review of Systems  Strengths and Liabilities: Strength: Patient accepts guidance/feedback, Strength: Patient is expressive/articulate., Strength: Patient is intelligent., Strength: Patient has reasonable judgment., Liability: Patient has poor health.    Objective:     Vital Signs (Most Recent):  Temp: 98.7 °F (37.1 °C) (01/09/19 1620)  Pulse: 97 (01/09/19 1620)  Resp: 13 (01/09/19 1620)  BP: 120/78 (01/09/19 1620)  SpO2: 96 % (01/09/19 1620) Vital Signs (24h Range):  Temp:  [98.7 °F (37.1 °C)-98.8 °F (37.1 °C)] 98.7 °F (37.1 °C)  Pulse:  [] 97  Resp:  [9-16] 13  SpO2:  [95 %-99 %] 96 %  BP: ()/(58-96) 120/78     Height: 5' 1" (154.9 cm)  Weight: 42.2 kg (93 lb)  Body mass index is 17.57 kg/m².      Intake/Output Summary (Last 24 hours) at 1/9/2019 2104  Last data filed at 1/9/2019 1800  Gross per 24 hour   Intake 960 ml   Output 600 ml   Net 360 ml       Physical Exam   Psychiatric:   Appearance: well developed thin female wearing casual clothing in NAD  Behavior:  calm, cooperative  Speech:  normal rate, tone, and volume  Mood: "alright"  Affect: constricted  Thought Process:  linear  Thought Perceptions:  denied AVH  Thought Content:  denied SI, HI; no delusions apparent  Sensorium:  awake, alert  Attention/Concentration:  intact to conversation  Orientation:  person, place, time, and situation  Memory:  intact (recent, remote)  Abstraction:  intact   Insight:  fair  Judgment:  good     Nursing note and vitals reviewed.       Significant Labs:   Recent Results (from the past 48 hour(s))   CBC auto differential    Collection Time: 01/08/19  3:08 AM   Result Value Ref Range    WBC 7.90 3.90 - 12.70 K/uL    RBC 3.74 (L) 4.00 - 5.40 M/uL    Hemoglobin 10.2 (L) 12.0 - 16.0 g/dL    Hematocrit 32.1 (L) 37.0 - 48.5 %    MCV 86 82 - 98 fL    MCH 27.3 27.0 - 31.0 pg    MCHC 31.8 (L) 32.0 - 36.0 g/dL    RDW 15.5 (H) 11.5 - 14.5 %    Platelets 244 150 - 350 K/uL    MPV 10.3 9.2 - 12.9 fL    Immature " Granulocytes 0.3 0.0 - 0.5 %    Gran # (ANC) 4.7 1.8 - 7.7 K/uL    Immature Grans (Abs) 0.02 0.00 - 0.04 K/uL    Lymph # 2.4 1.0 - 4.8 K/uL    Mono # 0.7 0.3 - 1.0 K/uL    Eos # 0.1 0.0 - 0.5 K/uL    Baso # 0.02 0.00 - 0.20 K/uL    nRBC 0 0 /100 WBC    Gran% 59.0 38.0 - 73.0 %    Lymph% 30.6 18.0 - 48.0 %    Mono% 9.2 4.0 - 15.0 %    Eosinophil% 0.6 0.0 - 8.0 %    Basophil% 0.3 0.0 - 1.9 %    Differential Method Automated    Basic metabolic panel    Collection Time: 01/08/19  3:08 AM   Result Value Ref Range    Sodium 141 136 - 145 mmol/L    Potassium 3.8 3.5 - 5.1 mmol/L    Chloride 111 (H) 95 - 110 mmol/L    CO2 20 (L) 23 - 29 mmol/L    Glucose 85 70 - 110 mg/dL    BUN, Bld 4 (L) 6 - 20 mg/dL    Creatinine 0.6 0.5 - 1.4 mg/dL    Calcium 8.9 8.7 - 10.5 mg/dL    Anion Gap 10 8 - 16 mmol/L    eGFR if African American >60.0 >60 mL/min/1.73 m^2    eGFR if non African American >60.0 >60 mL/min/1.73 m^2   Magnesium    Collection Time: 01/08/19  3:08 AM   Result Value Ref Range    Magnesium 1.5 (L) 1.6 - 2.6 mg/dL   Metanephrines, urine 24 Hours    Collection Time: 01/08/19  3:28 PM   Result Value Ref Range    Hours Collected 24 Hours     Urine Total Volume 24    Creatinine, urine, timed 24 Hours    Collection Time: 01/08/19  3:28 PM   Result Value Ref Range    Urine Volume 2825 mL    Urine Collection Duration 24 Hr    Urine, Creatinine 25.0 15.0 - 325.0 mg/dL    Creatinine, Timed Urine 29.4 (L) 40.0 - 75.0 mg/Hr    Creatinine, Ur (mg/spec) 706.3 mg/Spec   CBC auto differential    Collection Time: 01/09/19  7:06 AM   Result Value Ref Range    WBC 5.21 3.90 - 12.70 K/uL    RBC 4.54 4.00 - 5.40 M/uL    Hemoglobin 12.6 12.0 - 16.0 g/dL    Hematocrit 39.6 37.0 - 48.5 %    MCV 87 82 - 98 fL    MCH 27.8 27.0 - 31.0 pg    MCHC 31.8 (L) 32.0 - 36.0 g/dL    RDW 15.5 (H) 11.5 - 14.5 %    Platelets 303 150 - 350 K/uL    MPV 10.1 9.2 - 12.9 fL    Immature Granulocytes 0.0 0.0 - 0.5 %    Gran # (ANC) 2.1 1.8 - 7.7 K/uL    Immature  Grans (Abs) 0.00 0.00 - 0.04 K/uL    Lymph # 2.3 1.0 - 4.8 K/uL    Mono # 0.7 0.3 - 1.0 K/uL    Eos # 0.1 0.0 - 0.5 K/uL    Baso # 0.04 0.00 - 0.20 K/uL    nRBC 0 0 /100 WBC    Gran% 39.9 38.0 - 73.0 %    Lymph% 44.9 18.0 - 48.0 %    Mono% 12.7 4.0 - 15.0 %    Eosinophil% 1.7 0.0 - 8.0 %    Basophil% 0.8 0.0 - 1.9 %    Differential Method Automated    Comprehensive metabolic panel    Collection Time: 01/09/19  7:06 AM   Result Value Ref Range    Sodium 140 136 - 145 mmol/L    Potassium 4.3 3.5 - 5.1 mmol/L    Chloride 106 95 - 110 mmol/L    CO2 25 23 - 29 mmol/L    Glucose 104 70 - 110 mg/dL    BUN, Bld 8 6 - 20 mg/dL    Creatinine 0.7 0.5 - 1.4 mg/dL    Calcium 9.7 8.7 - 10.5 mg/dL    Total Protein 7.8 6.0 - 8.4 g/dL    Albumin 3.7 3.5 - 5.2 g/dL    Total Bilirubin 0.4 0.1 - 1.0 mg/dL    Alkaline Phosphatase 110 55 - 135 U/L    AST 16 10 - 40 U/L    ALT 12 10 - 44 U/L    Anion Gap 9 8 - 16 mmol/L    eGFR if African American >60.0 >60 mL/min/1.73 m^2    eGFR if non African American >60.0 >60 mL/min/1.73 m^2   Magnesium    Collection Time: 01/09/19  7:06 AM   Result Value Ref Range    Magnesium 2.2 1.6 - 2.6 mg/dL      No results found for: PHENYTOIN, PHENOBARB, VALPROATE, CBMZ    Significant Imaging: I have reviewed all pertinent imaging results/findings within the past 24 hours.

## 2019-01-10 NOTE — CONSULTS
"[facility]  Adult Nutrition  Consult Note    SUMMARY       Recommendations    Recommendation/Intervention:   1. Order Boost Breeze to aid in nutrient intake.   2. Continue current regular diet and encourage meal/fluid intake as pt likely with malabsorption. Pt shows signs of moderate acute malnutrition due to PO intake PTA, wt loss, and physical exam.   3. RD following.    Goals: Intake >/=85% EEN/EPN with no further weight loss  Nutrition Goal Status: new  Communication of RD Recs: (POC)    Reason for Assessment    Reason For Assessment: consult  Diagnosis: (sinus tachycardia)  Relevant Medical History: Crohn's disease, s/p total colectomy with end ileostomy, anxiety/depression    General Information Comments: Pt reports appetite is up and down. Reports wt loss but actual wt loss closer to 5-10lb rather than reported 17lb. Notes that clothes are fitting differently, sitting is sometimes painful due to hip bones. Reports she has been taking Marinol x 2 months with improved appetite/intake but has had increased ostomy output with some malabsorption. Dislikes milky ONS but agreeable to Boost Breeze. NFPE completed with well-nourished triceps, mild wasting of scapula, clavicles, lower extremities.    Nutrition Discharge Planning: Adequate PO Intake to prevent further weight loss    Nutrition Risk Screen    Nutrition Risk Screen: no indicators present    Nutrition/Diet History    Patient Reported Diet/Restrictions/Preferences: general  Spiritual, Cultural Beliefs, Taoism Practices, Values that Affect Care: yes  Factors Affecting Nutritional Intake: decreased appetite, depression, altered gastrointestinal function    Anthropometrics    Temp: 98.7 °F (37.1 °C)  Height Method: Stated  Height: 5' 1" (154.9 cm)  Height (inches): 61 in  Weight Method: Bed Scale  Weight: 42.2 kg (93 lb)  Weight (lb): 93 lb  Ideal Body Weight (IBW), Female: 105 lb  % Ideal Body Weight, Female (lb): 88.57 lb  BMI " (Calculated): 17.6  BMI Grade: 17 - 18.4 protein-energy malnutrition grade I  Usual Body Weight (UBW), k.5 kg(2018 per chart review)  % Usual Body Weight: 92.91  % Weight Change From Usual Weight: -7.29 %       Lab/Procedures/Meds    Pertinent Labs Reviewed: reviewed  Pertinent Labs Comments: Alb 3.4  Pertinent Medications Reviewed: reviewed  Pertinent Medications Comments: Marinol, Mg, K citrate, IVF    Estimated/Assessed Needs    Weight Used For Calorie Calculations: 42.2 kg (93 lb 0.6 oz)  Energy Calorie Requirements (kcal): 1311  Energy Need Method: Lynn-St Jeor(x 1.25 (PAL))  Protein Requirements: 51-59 gm (1.2-1.4 gm/kg)  Weight Used For Protein Calculations: 42.2 kg (93 lb 0.6 oz)  Fluid Requirements (mL): per MD     RDA Method (mL): 1311       Nutrition Prescription Ordered    Current Diet Order: Regular    Evaluation of Received Nutrient/Fluid Intake    IV Fluid (mL): (LR @ 75 ml/hr)  Comments: LBM 1/9  % Intake of Estimated Energy Needs: 0 - 25 %  % Meal Intake: 0 - 25 %    Nutrition Risk    Level of Risk/Frequency of Follow-up: low     Assessment and Plan    Moderate malnutrition    Malnutrition in the context of Acute Illness/Injury    Related to (etiology):  Altered GI function and poor appetite    Signs and Symptoms (as evidenced by):  Energy Intake: <75% of estimated energy requirement for >3 months  Muscle Mass Depletion: mild depletion of clavicle region, scapular region and lower extremities   Weight Loss: 7.5% x 2 months     Interventions/Recommendations (treatment strategy):  See RD note from 01/10/2019.  Commercial beverage    Nutrition Diagnosis Status:  New        Monitor and Evaluation    Food and Nutrient Intake: energy intake, food and beverage intake  Food and Nutrient Adminstration: diet order  Anthropometric Measurements: weight, weight change, body mass index  Biochemical Data, Medical Tests and Procedures: electrolyte and renal panel, gastrointestinal profile,  glucose/endocrine profile, inflammatory profile  Nutrition-Focused Physical Findings: overall appearance     Malnutrition Assessment  Malnutrition Type: acute illness or injury  Weight Loss (Malnutrition): (7.5% in 2 months)  Energy Intake (Malnutrition): less than or equal to 75% for greater than or equal to 1 month  Muscle Mass (Malnutrition): mild depletion   Subcutaneous Fat Loss (Final Summary): well nourished      Nutrition Follow-Up    RD Follow-up?: Yes

## 2019-01-10 NOTE — CONSULTS
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 medical care as per the primary team.    Anticipated Disposition: Home or self care, per primary     Total time:  60 minutes       Carin Del Rio MD   Psychiatry  [facility]

## 2019-01-10 NOTE — ASSESSMENT & PLAN NOTE
ASSESSMENT     Ivy Salgado is a 38 y.o. female with a past psychiatric history of depression and anxiety, who presented to the [facility] ED on 1/7/2019 due to palpitations. When seen today patient calm and cooperative with interview. Endorses symptoms consistent with generalized anxiety and depression over past 6 months, primarily due to worsening physical health (Crohns disease and recurrent flushing with palpitations) and family stressors. Past episodes of depression improved with Celexa 4 years ago.  Attempts to restart Celexa earlier this year aborted secondary to prolonged QTc. Anxiety does not seem to be well controlled with PRN xanax. Unclear if current symptoms of tachycardia and palpitations are due to panic attacks at this time.  Typically, panic disorder presents initially in a younger population.  Furthermore, the initial flushing and prolonged course of the attacks (more than 20 minutes) are atypical of panic as well, as is the lack of response to Xanax.  Recommend continued work up by primary team for organic causes. Denies SI/HI/AVH. Does not meet criteria for PEC.      IMPRESSION  ARPITA  MDD  R/o panic attacks    RECOMMENDATION(S)     - Patient does not meet criteria for PEC or inpatient psychiatric hospitalization at this time.   - Would discontinue Xanax in favor of a trial of Klonopin 0.5 mg BID PRN anxiety.  Klonopin longer lasting, may provide better relief, and has less abuse/addiction potential.    - Continue patient home Xanax 1mg PO BID PRN for anxiety.   - Will consider and discuss further psychotropic agents for anxiety, mood, appetite and sleep, with patient, will need to monitor EKG for QTc.   - Expresses interest in outpatient psychiatric and psychology follow up.  Looking into resources for patient given Medicaid status.   -  psychiatry will continue to follow.

## 2019-01-10 NOTE — ASSESSMENT & PLAN NOTE
Malnutrition in the context of Acute Illness/Injury    Related to (etiology):  Altered GI function and poor appetite    Signs and Symptoms (as evidenced by):  Energy Intake: <75% of estimated energy requirement for >3 months  Muscle Mass Depletion: mild depletion of clavicle region, scapular region and lower extremities   Weight Loss: 7.5% x 2 months     Interventions/Recommendations (treatment strategy):  See RD note from 01/10/2019.  Commercial beverage    Nutrition Diagnosis Status:  New

## 2019-01-10 NOTE — PROGRESS NOTES
Progress Note  Hospital Medicine    Admit Date: 1/7/2019  Length of Stay:  LOS: 3 days     SUBJECTIVE:         Follow-up For:  Sinus tachycardia    HPI/Interval history (See H&P for complete P,F,SHx) :      urine cultures - klebsiella sensitive to ceftriaxone. s/p  psychiatry consulted for possible panic attacks/ depression - PRN Klonopin 0.5mg BID symptomatic relief. Avoid SSRI or serotonergic agents for now.  . started on RL hydration for increased ostomy output - stool for Clost diff assay negative      Review of Systems: List if applicable  Review of Systems   Constitutional: Positive for diaphoresis and unexpected weight change (7lb weight loss). Negative for activity change, appetite change, chills, fatigue and fever.   Eyes: Negative for photophobia and visual disturbance.   Respiratory: Negative for apnea, cough, choking, shortness of breath, wheezing and stridor.    Cardiovascular: Positive for palpitations.(resolved)  Negative for chest pain and leg swelling.   Gastrointestinal: Negative for abdominal pain, blood in stool, constipation, diarrhea, nausea and vomiting.   Endocrine: Negative for cold intolerance and heat intolerance.   Genitourinary: Positive for frequency. Negative for difficulty urinating.   Musculoskeletal: Negative for arthralgias and myalgias.   Skin: Negative for rash and wound.   Allergic/Immunologic: Negative for immunocompromised state.   Neurological: Negative for dizziness, weakness and headaches.   Psychiatric/Behavioral: Negative for agitation, behavioral problems and confusion.       OBJECTIVE:     Vital Signs Range (Last 24H):  Temp:  [98.7 °F (37.1 °C)]   Pulse:  []   Resp:  [8-18]   BP: (102-143)/()   SpO2:  [95 %-98 %]     Physical Exam:  Physical Exam   Constitutional: She is oriented to person, place, and time. She appears well-developed and well-nourished. No distress.   Thin, tremulous   HENT:   Head: Normocephalic and atraumatic.   Mouth/Throat: No  oropharyngeal exudate.   Eyes: Conjunctivae and EOM are normal. Pupils are equal, round, and reactive to light. No scleral icterus.   Neck: Normal range of motion. Neck supple. No tracheal deviation present. No thyromegaly present.   Cardiovascular: Regular rhythm, normal heart sounds and intact distal pulses. Tachycardia present.   No murmur heard.  Pulmonary/Chest: Effort normal and breath sounds normal. No respiratory distress. She has no wheezes. She has no rales. She exhibits no tenderness.   Abdominal: Soft. Bowel sounds are normal. She exhibits no distension. There is no tenderness. There is no rebound and no guarding.   +ve Ileostomy   Musculoskeletal: Normal range of motion. She exhibits no edema or tenderness.   Lymphadenopathy:     She has no cervical adenopathy.   Neurological: She is alert and oriented to person, place, and time.   Skin: Skin is warm and dry. No rash noted. She is not diaphoretic. No erythema. No pallor.   Psychiatric: She has a normal mood and affect. Her behavior is normal. Judgment and thought content normal.        CN III, IV, VI   Pupils are equal, round, and reactive to light.  Extraocular motions are normal.                Medications:  Medication list was reviewed and changes noted under Assessment/Plan.      Current Facility-Administered Medications:     acetaminophen tablet 650 mg, 650 mg, Oral, Q8H PRN, Charlette Santana MD, 650 mg at 01/09/19 1236    ALPRAZolam tablet 1 mg, 1 mg, Oral, BID PRN, Charlette Santana MD, 1 mg at 01/10/19 1030    cefTRIAXone injection 1 g, 1 g, Intravenous, Q24H, Se Kolb MD, 1 g at 01/10/19 0830    diphenoxylate-atropine 2.5-0.025 mg per tablet 1 tablet, 1 tablet, Oral, QID PRN, Charlette Santana MD, 1 tablet at 01/09/19 1513    dronabinol capsule 5 mg, 5 mg, Oral, BID AC, Charlette Santana MD, 5 mg at 01/10/19 0556    lactated ringers infusion, , Intravenous, Continuous, Se Kolb MD, Last  Rate: 75 mL/hr at 01/10/19 0556    loperamide capsule 2 mg, 2 mg, Oral, Daily PRN, Charlette Santana MD, 2 mg at 01/09/19 1237    magnesium oxide tablet 400 mg, 400 mg, Oral, Daily, Charlette Santana MD, 400 mg at 01/10/19 0831    oxyCODONE-acetaminophen  mg per tablet 1 tablet, 1 tablet, Oral, Q6H PRN, Charlette Santana MD, 1 tablet at 01/10/19 1448    potassium citrate SR tablet 30 mEq, 30 mEq, Oral, BID, Charlette Santana MD, 30 mEq at 01/10/19 0830    promethazine tablet 25 mg, 25 mg, Oral, Q6H PRN, Charlette Santana MD, 25 mg at 01/10/19 0151    sodium chloride 0.9% flush 5 mL, 5 mL, Intravenous, PRN, Charlette Santana MD    valACYclovir tablet 500 mg, 500 mg, Oral, Daily, Charlette Santana MD, 500 mg at 01/10/19 0830    zolpidem tablet 5 mg, 5 mg, Oral, Nightly PRN, Charlette Santana MD, 5 mg at 01/09/19 2111    acetaminophen, ALPRAZolam, diphenoxylate-atropine 2.5-0.025 mg, loperamide, oxyCODONE-acetaminophen, promethazine, sodium chloride 0.9%, zolpidem    Laboratory/Diagnostic Data:  Reviewed and noted in plan where applicable- Please see chart for full lab data.    Recent Labs   Lab 01/08/19 0308 01/09/19  0706 01/10/19  0455   WBC 7.90 5.21 4.95   HGB 10.2* 12.6 11.9*   HCT 32.1* 39.6 37.1    303 296       Recent Labs   Lab 01/08/19  0308 01/09/19  0706 01/10/19  0455    140 140   K 3.8 4.3 4.1   * 106 105   CO2 20* 25 26   BUN 4* 8 9   CREATININE 0.6 0.7 0.7   GLU 85 104 98   CALCIUM 8.9 9.7 9.5   MG 1.5* 2.2 1.9       Recent Labs   Lab 01/07/19  1433 01/09/19  0706 01/10/19  0455   ALKPHOS 118 110 97   ALT 18 12 11   AST 20 16 16   ALBUMIN 4.0 3.7 3.4*   PROT 8.2 7.8 7.1   BILITOT 0.4 0.4 0.4        Microbiology labs for the last week  Microbiology Results (last 7 days)     Procedure Component Value Units Date/Time    Clostridium difficile EIA [[id number]] Collected:  01/09/19 9480    Order Status:   Completed Specimen:  Stool Updated:  01/10/19 0531     C. diff Antigen Negative     C difficile Toxins A+B, EIA Negative     Comment: Testing not recommended for children <24 months old.       Stool culture [[id number]] Collected:  01/09/19 1730    Order Status:  Sent Specimen:  Stool Updated:  01/09/19 2340    E. coli 0157 antigen [[id number]] Collected:  01/09/19 1730    Order Status:  No result Specimen:  Stool Updated:  01/09/19 2340    Blood Culture #1 **CANNOT BE ORDERED STAT** [[id number]] Collected:  01/07/19 1712    Order Status:  Completed Specimen:  Blood from Peripheral, Forearm, Left Updated:  01/09/19 1812     Blood Culture, Routine No Growth to date     Blood Culture, Routine No Growth to date     Blood Culture, Routine No Growth to date    Blood Culture #2 **CANNOT BE ORDERED STAT** [[id number]] Collected:  01/07/19 1717    Order Status:  Completed Specimen:  Blood from Peripheral, Wrist, Left Updated:  01/09/19 1812     Blood Culture, Routine No Growth to date     Blood Culture, Routine No Growth to date     Blood Culture, Routine No Growth to date    Urine culture [[id number]] Collected:  01/07/19 1723    Order Status:  Completed Specimen:  Urine Updated:  01/09/19 1039     Urine Culture, Routine --     GRAM NEGATIVE LULU  >100,000 cfu/ml  Identification and susceptibility pending      Narrative:       add on test urine toxicology per dr nohemy marks order #[id number]   01/07/2019  22:58            Imaging Results          X-Ray Chest PA And Lateral (Final result)  Result time 01/07/19 17:34:50    Final result by Suzi Martinez MD (01/07/19 17:34:50)                 Impression:      No acute cardiopulmonary abnormality.      Electronically signed by: Suzi Martinez  Date:    01/07/2019  Time:    17:34             Narrative:    EXAMINATION:  XR CHEST PA AND LATERAL    CLINICAL HISTORY:  Fever, unspecified    TECHNIQUE:  PA and lateral views of the chest were performed.    COMPARISON:  Multiple priors,  "most recent 12/11/2018    FINDINGS:  Stable positioning of a left chest wall port catheter.The lungs are clear.  No focal consolidation, pleural effusion or pneumothorax.    Normal cardiomediastinal contour.    No acute or aggressive osseous abnormality. Scoliosis.                                Estimated body mass index is 17.57 kg/m² as calculated from the following:    Height as of this encounter: 5' 1" (1.549 m).    Weight as of this encounter: 42.2 kg (93 lb).    I & O (Last 24H):    Intake/Output Summary (Last 24 hours) at 1/10/2019 1513  Last data filed at 1/10/2019 1400  Gross per 24 hour   Intake 1580 ml   Output 1320 ml   Net 260 ml       Estimated Creatinine Clearance: 74 mL/min (based on SCr of 0.7 mg/dL).    ASSESSMENT/PLAN:     Active Problems:      Active Hospital Problems    Diagnosis  POA    *Sinus tachycardia [R00.0]Multiple episodes of flushing, tachycardia, diaphoresis, anxiety over the course of the year. Being worked up by endocrinology for pheo vs carcinoid syndrome.Dr. Landry  recommended patient commence 24h urine metanephrine collection and follow up in clinic.   - Previous metanephrines, catecholamines, 5HIAA, chromogranin A levels unremarkable. 24hr urine metanephrines pending. not taking metoprolol as per endocrine advice   Catecholamine levels mildly above normal.  FT4,- normal   Hemodynamically stable.         Generalized anxiety disorder [F41.1] r/ o Panic attack - on Xanax PRN -   Had an episode of flushing with tachycardia  117 with SBP 150s this AM Telemetry with sinus tachycardia. c/o dysuria and left flank resolved now. urine cultures -gram negative rods. tearful and admits to depression psychiatry consulted for possible panic attacks/ depression . admits stressors at home. previosly on celexa but discontinued dut prolonged QT. no psyc follow up since  1year  s/p psych eval on 1/10/19 - xanax discontinued  PRN Klonopin 0.5mg BID symptomatic relief. Avoid SSRI or serotonergic " agents for now.        Weight loss - 17 lb in 1 year. Nutrition consulted.o    Diarrhea/ increased ostomy output -  . started on RL hydration for increased ostomy output - stool for Clost diff assay negative. consider GI evaluation     Yes    Hypomagnesemia [E83.42]replaced   Unknown    Anemia [D64.9]Hb 10.2 normocytis. stable. monitgor   Unknown    Urinary tract infection with hematuria [N39.0, R31.9]/ pyelonerphritis -  urine cultures - klebsiella sensitive to ceftriaxone. Follows w/ Urology OP. consider ID evaluation for recurrent UTI   Yes    Hypokalemia [E87.6]repalced   Yes    Appetite impaired [R63.0]on Dronabinol . as above   Yes    Crohn's disease of small intestine [K50.00]    Chronic, stable, s/p ileostomy. Output about the same.   Continue lomotil/loperamide prn.   Continue Oxy IR for pain relief, prn. on entyvio      Yes     Chronic      Yes      Resolved Hospital Problems   No resolved problems to display.         Disposition- home    DVT prophylaxis addressed with:MARIA EUGENIA Kolb MD  Attending Staff Physician  [facility]  pager- [phone]  [employer] - [phone]

## 2019-01-10 NOTE — PLAN OF CARE
Problem: Adult Inpatient Plan of Care  Goal: Plan of Care Review  Outcome: Ongoing (interventions implemented as appropriate)  Pt free of falls/injuries throughout the shift. Bed locked, in lowest position, call bell within reach. Pt afebrile, pain assess & nausea treated. VSS, pt in no distress, will continue to monitor.

## 2019-01-10 NOTE — PLAN OF CARE
Problem: Adult Inpatient Plan of Care  Goal: Plan of Care Review      Recommendations     Recommendation/Intervention:   1. Order Boost Breeze to aid in nutrient intake.   2. Continue current regular diet and encourage meal/fluid intake as pt likely with malabsorption. Pt shows signs of moderate acute malnutrition due to PO intake PTA, wt loss, and physical exam.   3. RD following.     Goals: Intake >/=85% EEN/EPN with no further weight loss  Nutrition Goal Status: new

## 2019-01-11 LAB
ALBUMIN SERPL BCP-MCNC: 3.4 G/DL
ALP SERPL-CCNC: 92 U/L
ALT SERPL W/O P-5'-P-CCNC: 8 U/L
ANION GAP SERPL CALC-SCNC: 6 MMOL/L
AST SERPL-CCNC: 16 U/L
BASOPHILS # BLD AUTO: 0.03 K/UL
BASOPHILS NFR BLD: 0.6 %
BILIRUB SERPL-MCNC: 0.4 MG/DL
BUN SERPL-MCNC: 8 MG/DL
CALCIUM SERPL-MCNC: 9.7 MG/DL
CHLORIDE SERPL-SCNC: 106 MMOL/L
CO2 SERPL-SCNC: 28 MMOL/L
CREAT SERPL-MCNC: 0.7 MG/DL
DIFFERENTIAL METHOD: ABNORMAL
EOSINOPHIL # BLD AUTO: 0.1 K/UL
EOSINOPHIL NFR BLD: 1.6 %
ERYTHROCYTE [DISTWIDTH] IN BLOOD BY AUTOMATED COUNT: 15.2 %
EST. GFR  (AFRICAN AMERICAN): >60 ML/MIN/1.73 M^2
EST. GFR  (NON AFRICAN AMERICAN): >60 ML/MIN/1.73 M^2
GLUCOSE SERPL-MCNC: 90 MG/DL
HCT VFR BLD AUTO: 35.6 %
HGB BLD-MCNC: 11.2 G/DL
IMM GRANULOCYTES # BLD AUTO: 0 K/UL
IMM GRANULOCYTES NFR BLD AUTO: 0 %
LYMPHOCYTES # BLD AUTO: 2.9 K/UL
LYMPHOCYTES NFR BLD: 56.9 %
MAGNESIUM SERPL-MCNC: 1.9 MG/DL
MCH RBC QN AUTO: 27.3 PG
MCHC RBC AUTO-ENTMCNC: 31.5 G/DL
MCV RBC AUTO: 87 FL
MONOCYTES # BLD AUTO: 0.6 K/UL
MONOCYTES NFR BLD: 12.5 %
NEUTROPHILS # BLD AUTO: 1.5 K/UL
NEUTROPHILS NFR BLD: 28.4 %
NRBC BLD-RTO: 0 /100 WBC
PHOSPHATE SERPL-MCNC: 4.6 MG/DL
PLATELET # BLD AUTO: 288 K/UL
PMV BLD AUTO: 9.9 FL
POTASSIUM SERPL-SCNC: 4.1 MMOL/L
PROT SERPL-MCNC: 7 G/DL
RBC # BLD AUTO: 4.11 M/UL
SODIUM SERPL-SCNC: 140 MMOL/L
WBC # BLD AUTO: 5.13 K/UL

## 2019-01-11 PROCEDURE — 63600175 PHARM REV CODE 636 W HCPCS: Performed by: HOSPITALIST

## 2019-01-11 PROCEDURE — 11000001 HC ACUTE MED/SURG PRIVATE ROOM

## 2019-01-11 PROCEDURE — 99232 PR SUBSEQUENT HOSPITAL CARE,LEVL II: ICD-10-PCS | Mod: AF,HB,, | Performed by: PSYCHIATRY & NEUROLOGY

## 2019-01-11 PROCEDURE — 99233 SBSQ HOSP IP/OBS HIGH 50: CPT | Mod: ,,, | Performed by: HOSPITALIST

## 2019-01-11 PROCEDURE — 36415 COLL VENOUS BLD VENIPUNCTURE: CPT

## 2019-01-11 PROCEDURE — 99232 SBSQ HOSP IP/OBS MODERATE 35: CPT | Mod: AF,HB,, | Performed by: PSYCHIATRY & NEUROLOGY

## 2019-01-11 PROCEDURE — 84100 ASSAY OF PHOSPHORUS: CPT

## 2019-01-11 PROCEDURE — 99232 SBSQ HOSP IP/OBS MODERATE 35: CPT | Mod: ,,, | Performed by: INTERNAL MEDICINE

## 2019-01-11 PROCEDURE — 25000003 PHARM REV CODE 250: Performed by: INTERNAL MEDICINE

## 2019-01-11 PROCEDURE — 83735 ASSAY OF MAGNESIUM: CPT

## 2019-01-11 PROCEDURE — 99233 PR SUBSEQUENT HOSPITAL CARE,LEVL III: ICD-10-PCS | Mod: ,,, | Performed by: HOSPITALIST

## 2019-01-11 PROCEDURE — 99232 PR SUBSEQUENT HOSPITAL CARE,LEVL II: ICD-10-PCS | Mod: ,,, | Performed by: INTERNAL MEDICINE

## 2019-01-11 PROCEDURE — 25000003 PHARM REV CODE 250: Performed by: HOSPITALIST

## 2019-01-11 PROCEDURE — 85025 COMPLETE CBC W/AUTO DIFF WBC: CPT

## 2019-01-11 PROCEDURE — 80053 COMPREHEN METABOLIC PANEL: CPT

## 2019-01-11 RX ORDER — MIRTAZAPINE 7.5 MG/1
7.5 TABLET, FILM COATED ORAL NIGHTLY
Status: DISCONTINUED | OUTPATIENT
Start: 2019-01-11 | End: 2019-01-14

## 2019-01-11 RX ORDER — DIPHENOXYLATE HYDROCHLORIDE AND ATROPINE SULFATE 2.5; .025 MG/1; MG/1
1 TABLET ORAL
Status: DISCONTINUED | OUTPATIENT
Start: 2019-01-11 | End: 2019-01-16 | Stop reason: HOSPADM

## 2019-01-11 RX ORDER — PANTOPRAZOLE SODIUM 40 MG/1
40 TABLET, DELAYED RELEASE ORAL
Status: DISCONTINUED | OUTPATIENT
Start: 2019-01-11 | End: 2019-01-16 | Stop reason: HOSPADM

## 2019-01-11 RX ORDER — SODIUM CHLORIDE 0.9 % (FLUSH) 0.9 %
5 SYRINGE (ML) INJECTION
Status: DISCONTINUED | OUTPATIENT
Start: 2019-01-11 | End: 2019-01-16 | Stop reason: HOSPADM

## 2019-01-11 RX ORDER — LOPERAMIDE HYDROCHLORIDE 2 MG/1
2 CAPSULE ORAL 4 TIMES DAILY
Status: DISCONTINUED | OUTPATIENT
Start: 2019-01-11 | End: 2019-01-16 | Stop reason: HOSPADM

## 2019-01-11 RX ORDER — AMOXICILLIN AND CLAVULANATE POTASSIUM 875; 125 MG/1; MG/1
1 TABLET, FILM COATED ORAL EVERY 12 HOURS
Status: COMPLETED | OUTPATIENT
Start: 2019-01-11 | End: 2019-01-14

## 2019-01-11 RX ADMIN — SODIUM CHLORIDE, SODIUM LACTATE, POTASSIUM CHLORIDE, AND CALCIUM CHLORIDE: .6; .31; .03; .02 INJECTION, SOLUTION INTRAVENOUS at 09:01

## 2019-01-11 RX ADMIN — LOPERAMIDE HYDROCHLORIDE 2 MG: 2 CAPSULE ORAL at 05:01

## 2019-01-11 RX ADMIN — DIPHENOXYLATE HYDROCHLORIDE AND ATROPINE SULFATE 1 TABLET: 2.5; .025 TABLET ORAL at 05:01

## 2019-01-11 RX ADMIN — PANTOPRAZOLE SODIUM 40 MG: 40 TABLET, DELAYED RELEASE ORAL at 11:01

## 2019-01-11 RX ADMIN — VALACYCLOVIR HYDROCHLORIDE 500 MG: 500 TABLET, FILM COATED ORAL at 09:01

## 2019-01-11 RX ADMIN — MAGNESIUM OXIDE TAB 400 MG (241.3 MG ELEMENTAL MG) 400 MG: 400 (241.3 MG) TAB at 09:01

## 2019-01-11 RX ADMIN — LOPERAMIDE HYDROCHLORIDE 2 MG: 2 CAPSULE ORAL at 09:01

## 2019-01-11 RX ADMIN — POTASSIUM CITRATE 30 MEQ: 10 TABLET ORAL at 09:01

## 2019-01-11 RX ADMIN — DRONABINOL 5 MG: 2.5 CAPSULE ORAL at 05:01

## 2019-01-11 RX ADMIN — CLONAZEPAM 0.5 MG: 0.5 TABLET ORAL at 09:01

## 2019-01-11 RX ADMIN — OXYCODONE HYDROCHLORIDE AND ACETAMINOPHEN 1 TABLET: 10; 325 TABLET ORAL at 08:01

## 2019-01-11 RX ADMIN — MIRTAZAPINE 7.5 MG: 7.5 TABLET ORAL at 09:01

## 2019-01-11 RX ADMIN — CLONAZEPAM 0.5 MG: 0.5 TABLET ORAL at 11:01

## 2019-01-11 RX ADMIN — POTASSIUM CITRATE 30 MEQ: 10 TABLET ORAL at 08:01

## 2019-01-11 RX ADMIN — PROMETHAZINE HYDROCHLORIDE 25 MG: 25 TABLET ORAL at 05:01

## 2019-01-11 RX ADMIN — AMOXICILLIN AND CLAVULANATE POTASSIUM 1 TABLET: 875; 125 TABLET, FILM COATED ORAL at 09:01

## 2019-01-11 RX ADMIN — CEFTRIAXONE 2 G: 2 INJECTION, SOLUTION INTRAVENOUS at 09:01

## 2019-01-11 RX ADMIN — PROMETHAZINE HYDROCHLORIDE 25 MG: 25 TABLET ORAL at 08:01

## 2019-01-11 RX ADMIN — OXYCODONE HYDROCHLORIDE AND ACETAMINOPHEN 1 TABLET: 10; 325 TABLET ORAL at 10:01

## 2019-01-11 NOTE — PLAN OF CARE
Problem: Fall Injury Risk  Goal: Absence of Fall and Fall-Related Injury  Outcome: Ongoing (interventions implemented as appropriate)  Pt remains free from falls and injury throughout shift.    Problem: Adult Inpatient Plan of Care  Goal: Plan of Care Review  Outcome: Ongoing (interventions implemented as appropriate)  Pt AAOX4 VVS remain stable. Cardiac monitoring in place. Pain manage with PRN medication. LR infusing @ 75 mL hr patent IV line.    Problem: Infection  Goal: Infection Symptom Resolution  Outcome: Ongoing (interventions implemented as appropriate)  Pt without S/S of new infection, remains afebrile throughout shift.

## 2019-01-11 NOTE — SUBJECTIVE & OBJECTIVE
Past Medical History:   Diagnosis Date    Abnormal Pap smear 2007    Abnormal Pap smear 5/26/2011    Anemia     Anxiety     Arthritis     C. difficile diarrhea     Crohn's disease     Depression 8/5/2017    Encounter for blood transfusion     Genital HSV     History of colposcopy with cervical biopsy 2007 and 7/2011 2007-LYLA I  and 7/2011- LYLA I    Hypertension     Kidney stone     Kidney stone     Recurrent UTI 4/3/2013    S/P ileostomy 7/9/2012    Sterilization 6/23/2012       Past Surgical History:   Procedure Laterality Date    ABDOMINAL SURGERY      APPENDECTOMY      BLADDER SURGERY      partial cystectomy due to fistula    CKC      COLON SURGERY      COLONOSCOPY      EGD (ESOPHAGOGASTRODUODENOSCOPY) N/A 9/6/2013    Performed by Emilio Cameron [facility] at [facility] (4TH FLR)    ESOPHAGOGASTRODUODENOSCOPY (EGD) N/A 10/14/2016    Performed by Janelle Mcpherson MD at [facility] (2ND FLR)    ESOPHAGOGASTRODUODENOSCOPY (EGD) N/A 10/23/2014    Performed by Nathanael Mitchell MD at [facility] (2ND FLR)    EXAM UNDER ANESTHESIA N/A 8/29/2013    Performed by Bob Parsons MD at [facility] 2ND FLR    EXAM UNDER ANESTHESIA N/A 1/18/2013    Performed by Bob [facility]ons [facility] at [facility] 2ND FLR    HYSTERECTOMY-ABDOMINAL-TOTAL (SHELLIE) N/A 4/16/2015    [facility] at [facility] OR    ILEOSCOPY N/A 8/8/2017    Performed by Tommie Klein MD at [facility] (2ND FLR)    ILEOSCOPY N/A 10/14/2016    Performed by Janelle Mcpherson MD at [facility] (2ND FLR)    ILEOSCOPY N/A 9/25/2013    Performed by Emilio Cameron MD at [facility] (4TH FLR)    ILEOSTOMY      INCISION AND DRAINAGE, ABSCESS N/A 8/29/2013    Performed by Bob Parsons MD at [facility] 2ND FLR    [id number]-TRHW-T-CATH Left 2/21/2017    Performed by Parish Sanchez Jr., MD at [facility] OR 2ND FLR    OOPHORECTOMY Right 04/16/2015    PORTACATH PLACEMENT  02/21/2017    REPAIR-BLADDER  4/16/2015    Per[facility]ix [facility]ales  [facility] at [facility]    SALPINGO-OOPHERECTOMY Right 4/16/2015    [facility] at [facility]    SIGMOIDOSCOPY, FLEXIBLE N/A 2/7/2014    [facility] (2[facility])    SKIN BIOPSY      SMALL INTESTINE SURGERY      TOTAL ABDOMINAL HYSTERECTOMY  04/16/2015    TOTAL COLECTOMY      TUBAL LIGATION  06/06/2012    UPPER GASTROINTESTINAL ENDOSCOPY         Review of patient's allergies indicates:   Allergen Reactions    Azathioprine sodium Other (See Comments)     Other reaction(s): pancreatitis  Other reaction(s): pancreatitis    Methotrexate analogues Other (See Comments)     leukopenia    Stelara [ustekinumab] Other (See Comments)     Multiple infections    Zofran [ondansetron hcl (pf)] Other (See Comments)     Per patient causes prolong QT    Vancomycin analogues Hives    Morphine Itching and Other (See Comments)     Other reaction(s): Itching    Bactrim [sulfamethoxazole-trimethoprim] Palpitations    Ciprofloxacin Palpitations     Family History     Problem Relation (Age of Onset)    Cancer Father, Maternal Grandfather    Colon cancer Father    Crohn's disease Brother    Endometrial cancer Maternal Aunt    Hypertension Mother    Skin cancer Maternal Grandfather        Tobacco Use    Smoking status: Never Smoker    Smokeless tobacco: Never Used   Substance and Sexual Activity    Alcohol use: Yes     Alcohol/week: 0.0 oz     Comment: Patient only drinks wine not regular basis.    Drug use: No    Sexual activity: Yes     Partners: Male     Birth control/protection: Pill, Surgical, Condom     Comment: HYST     Review of Systems   Constitutional: Positive for unexpected weight change. Negative for activity change, appetite change (endorses good appetite but BM with any PO intake), chills, fatigue and fever.   HENT: Negative for sore throat and trouble swallowing.    Eyes: Negative for visual disturbance.   Respiratory: Negative for cough and shortness of breath.     Gastrointestinal: Positive for abdominal pain (intermittent stabbing lower abdominal pain at baseline. currently no pain.) and diarrhea (increased ostomy output (not changed recently)). Negative for abdominal distention, anal bleeding (no blood in ostomy), blood in stool, constipation, nausea and vomiting.   Genitourinary: Negative for dysuria.   Musculoskeletal: Negative for arthralgias and myalgias.   Skin: Negative for rash.   Neurological: Negative for dizziness, light-headedness and headaches.   Psychiatric/Behavioral: Negative for agitation and confusion.     Objective:     Vital Signs (Most Recent):  Temp: 98.6 °F (37 °C) (01/11/19 0858)  Pulse: (!) 124 (01/11/19 0905)  Resp: 15 (01/11/19 0905)  BP: (!) 170/102 (01/11/19 0905)  SpO2: (!) 94 % (01/11/19 0905) Vital Signs (24h Range):  Temp:  [98 °F (36.7 °C)-98.8 °F (37.1 °C)] 98.6 °F (37 °C)  Pulse:  [] 124  Resp:  [11-18] 15  SpO2:  [94 %-98 %] 94 %  BP: (103-170)/() 170/102     Weight: 42.2 kg (93 lb) (01/07/19 2113)  Body mass index is 17.57 kg/m².      Intake/Output Summary (Last 24 hours) at 1/11/2019 0941  Last data filed at 1/11/2019 0600  Gross per 24 hour   Intake 1600 ml   Output 2110 ml   Net -510 ml       Lines/Drains/Airways     Central Venous Catheter Line                 Port A Cath Single Lumen -- days         Port A Cath Single Lumen -- days          Drain                 Ileostomy RLQ -- days         Ileostomy 06/16/12 0000 RLQ 2400 days                Physical Exam   Constitutional: She is oriented to person, place, and time. She appears well-developed and well-nourished. No distress.   Asleep easily awoken. Appears comfortable in no distress. Friendly and talkative.   HENT:   Head: Normocephalic and atraumatic.   Mouth/Throat: No oropharyngeal exudate.   Eyes: Conjunctivae are normal. No scleral icterus.   Cardiovascular: Regular rhythm and normal heart sounds.   Tachycardic   Pulmonary/Chest: Breath sounds normal. No  respiratory distress.   Abdominal: Soft. Bowel sounds are normal. She exhibits no distension and no mass. There is no tenderness. There is no rebound and no guarding.   Soft, non-distended abdomen. Ostomy to RLQ.   Musculoskeletal: She exhibits no edema or tenderness.   Lymphadenopathy:     She has no cervical adenopathy.   Neurological: She is alert and oriented to person, place, and time.   Skin: Skin is warm. Capillary refill takes less than 2 seconds. She is not diaphoretic.   Unremarkable port-o-cath site   Psychiatric: She has a normal mood and affect. Her behavior is normal. Judgment and thought content normal.   Nursing note and vitals reviewed.      Significant Labs:  CBC:   Recent Labs   Lab 01/10/19  0455 01/11/19  0549   WBC 4.95 5.13   HGB 11.9* 11.2*   HCT 37.1 35.6*    288     CMP:   Recent Labs   Lab 01/11/19  0549   GLU 90   CALCIUM 9.7   ALBUMIN 3.4*   PROT 7.0      K 4.1   CO2 28      BUN 8   CREATININE 0.7   ALKPHOS 92   ALT 8*   AST 16   BILITOT 0.4     Coagulation: No results for input(s): PT, INR, APTT in the last 48 hours.    Significant Imaging:  Imaging results within the past 24 hours have been reviewed.

## 2019-01-11 NOTE — SUBJECTIVE & OBJECTIVE
Past Medical History:   Diagnosis Date    Abnormal Pap smear 2007    Abnormal Pap smear 5/26/2011    Anemia     Anxiety     Arthritis     C. difficile diarrhea     Crohn's disease     Depression 8/5/2017    Encounter for blood transfusion     Genital HSV     History of colposcopy with cervical biopsy 2007 and 7/2011 2007-LYLA I  and 7/2011- LYLA I    Hypertension     Kidney stone     Kidney stone     Recurrent UTI 4/3/2013    S/P ileostomy 7/9/2012    Sterilization 6/23/2012       Past Surgical History:   Procedure Laterality Date    ABDOMINAL SURGERY      APPENDECTOMY      BLADDER SURGERY      partial cystectomy due to fistula    CKC      COLON SURGERY      COLONOSCOPY      EGD (ESOPHAGOGASTRODUODENOSCOPY) N/A 9/6/2013    Performed by Emilio Cameron MD at [facility] ENDO (4TH FLR)    ESOPHAGOGASTRODUODENOSCOPY (EGD) N/A 10/14/2016    Performed by Janelle Mcpherson MD at [facility] ENDO (2ND FLR)    ESOPHAGOGASTRODUODENOSCOPY (EGD) N/A 10/23/2014    Performed by Nathanael Mitchell MD at [facility] ENDO (2ND FLR)    EXAM UNDER ANESTHESIA N/A 8/29/2013    Performed by Bob Parsons MD at [facility] OR 2ND FLR    EXAM UNDER ANESTHESIA N/A 1/18/2013    Performed by Bob Parsons MD at [facility] OR 2ND FLR    HYSTERECTOMY-ABDOMINAL-TOTAL (SHELLIE) N/A 4/16/2015    [facility]    ILEOSCOPY N/A 8/8/2017    Performed by Tommie Klein MD at [facility] ENDO (2ND FLR)    ILEOSCOPY N/A 10/14/2016    Performed by Janelle Mcpherson MD at [facility] ENDO (2ND FLR)    ILEOSCOPY N/A 9/25/2013    Performed by Emilio Cameron MD at [facility] ENDO (4TH FLR)    ILEOSTOMY      INCISION AND DRAINAGE, ABSCESS N/A 8/29/2013    Performed by Bob Parsons MD at [facility] OR 2ND FLR    QBGGUZEKJ-GVHU-R-CATH Left 2/21/2017    Performed by Parish Sanchez Jr., MD at [facility] OR 2ND FLR    OOPHORECTOMY Right 04/16/2015    PORTACATH PLACEMENT  02/21/2017    REPAIR-BLADDER  4/16/2015    [facility]    SALPINGO-OOPHERECTOMY Right 2015    [facility]    SIGMOIDOSCOPY, FLEXIBLE N/A 2014    Per[facility] by [facility]asto [facility]ell [facility] at [facility] END[facility] (2ND FLR)    SKIN BIOPSY      SMALL INTESTINE SURGERY      TOTAL ABDOMINAL HYSTERECTOMY  2015    TOTAL COLECTOMY      TUBAL LIGATION  2012    UPPER GASTROINTESTINAL ENDOSCOPY         Review of patient's allergies indicates:   Allergen Reactions    Azathioprine sodium Other (See Comments)     Other reaction(s): pancreatitis  Other reaction(s): pancreatitis    Methotrexate analogues Other (See Comments)     leukopenia    Stelara [ustekinumab] Other (See Comments)     Multiple infections    Zofran [ondansetron hcl (pf)] Other (See Comments)     Per patient causes prolong QT    Vancomycin analogues Hives    Morphine Itching and Other (See Comments)     Other reaction(s): Itching    Bactrim [sulfamethoxazole-trimethoprim] Palpitations    Ciprofloxacin Palpitations       Medications:  Medications Prior to Admission   Medication Sig    ALPRAZolam (XANAX) 1 MG tablet Take 1 tablet (1 mg total) by mouth 2 (two) times daily. as needed for anxiety.    diphenoxylate-atropine 2.5-0.025 mg (LOMOTIL) 2.5-0.025 mg per tablet Take 1 tablet by mouth 4 (four) times daily as needed for Diarrhea.    dronabinol (MARINOL) 5 MG capsule Take 1 capsule (5 mg total) by mouth 2 (two) times daily before meals.    loperamide (IMODIUM) 2 mg capsule Take 2 mg by mouth daily as needed for Diarrhea.    magnesium 250 mg Tab Take by mouth once.    multivitamin (ONE DAILY MULTIVITAMIN) per tablet Take 1 tablet by mouth once daily.    potassium citrate 15 mEq TbSR Take 2 tablets by mouth 2 (two) times daily.    promethazine (PHENERGAN) 25 MG tablet Take 1 tablet (25 mg total) by mouth every 6 (six) hours as needed.    valACYclovir (VALTREX) 500 MG tablet TK 1 T PO QD    [] butalbital-acetaminophen-caffeine  -40 mg (FIORICET, ESGIC) -40 mg per tablet Take 1 tablet by mouth every 4 (four) hours as needed for Headaches.    clindamycin (CLINDESSE) 2 % vaginal cream PLACE VAGINALLY EVERY EVENING    flu vac [id number] 36mos up,PF, 60 mcg (15 mcg x 4)/0.5 mL Syrg inject into the muscle    oxyCODONE-acetaminophen (PERCOCET)  mg per tablet Take 1 tablet by mouth every 6 (six) hours as needed for Pain.    terconazole (TERAZOL 7) 0.4 % Crea INSERT ONE APPLICATORFUL VAGINALLY AT BEDTIME    zolpidem (AMBIEN) 10 mg Tab TAKE 1 TABLET BY MOUTH EVERY EVENING     Antibiotics (From admission, onward)    Start     Stop Route Frequency Ordered    01/11/19 2100  amoxicillin-clavulanate 875-125mg per tablet 1 tablet      01/14 2059 Oral Every 12 hours 01/11/19 1227        Antifungals (From admission, onward)    None        Antivirals (From admission, onward)    None           Immunization History   Administered Date(s) Administered    Influenza 09/18/2013    Influenza - Quadrivalent - PF 10/04/2016, 11/17/2017, 09/13/2018    Influenza Split 09/18/2013    Pneumococcal Conjugate - 13 Valent 06/28/2017    Pneumococcal Polysaccharide - 23 Valent 08/19/2015    Td (ADULT) 06/28/2013       Family History     Problem Relation (Age of Onset)    Cancer Father, Maternal Grandfather    Colon cancer Father    Crohn's disease Brother    Endometrial cancer Maternal Aunt    Hypertension Mother    Skin cancer Maternal Grandfather        Social History     Socioeconomic History    Marital status: Single     Spouse name: None    Number of children: None    Years of education: None    Highest education level: None   Social Needs    Financial resource strain: None    Food insecurity - worry: None    Food insecurity - inability: None    Transportation needs - medical: None    Transportation needs - non-medical: None   Occupational History     Employer: [employer]   Tobacco Use    Smoking status: Never Smoker     Smokeless tobacco: Never Used   Substance and Sexual Activity    Alcohol use: Yes     Alcohol/week: 0.0 oz     Comment: Patient only drinks wine not regular basis.    Drug use: No    Sexual activity: Yes     Partners: Male     Birth control/protection: Pill, Surgical, Condom     Comment: [facility]   Other Topics Concern    Are you pregnant or think you may be? Not Asked    Breast-feeding Not Asked   Social History Narrative    None     Review of Systems   Constitutional: Positive for unexpected weight change. Negative for activity change, appetite change (endorses good appetite but BM with any PO intake), chills, fatigue and fever.   HENT: Negative for sore throat and trouble swallowing.    Eyes: Negative for visual disturbance.   Respiratory: Negative for cough and shortness of breath.    Gastrointestinal: Positive for abdominal pain (intermittent stabbing lower abdominal pain at baseline. currently no pain.) and diarrhea (increased ostomy output (not changed recently)). Negative for abdominal distention, anal bleeding (no blood in ostomy), blood in stool, constipation, nausea and vomiting.   Genitourinary: Negative for dysuria.   Musculoskeletal: Negative for arthralgias and myalgias.   Skin: Negative for rash.   Neurological: Negative for dizziness, light-headedness and headaches.   Psychiatric/Behavioral: Negative for agitation and confusion.     Objective:     Vital Signs (Most Recent):  Temp: (P) 99.3 °F (37.4 °C) (01/11/19 1605)  Pulse: (!) 118 (01/11/19 1624)  Resp: 18 (01/11/19 1124)  BP: 137/86 (01/11/19 1124)  SpO2: 99 % (01/11/19 1124) Vital Signs (24h Range):  Temp:  [98 °F (36.7 °C)-99.5 °F (37.5 °C)] (P) 99.3 °F (37.4 °C)  Pulse:  [] 118  Resp:  [11-18] 18  SpO2:  [94 %-99 %] 99 %  BP: (103-170)/() 137/86     Weight: 42.2 kg (93 lb)  Body mass index is 17.57 kg/m².    Estimated Creatinine Clearance: 74 mL/min (based on SCr of 0.7 mg/dL).    Physical Exam   Constitutional: She is  oriented to person, place, and time. She appears well-developed and well-nourished. No distress.   HENT:   Head: Normocephalic and atraumatic.   Mouth/Throat: No oropharyngeal exudate.   Eyes: Conjunctivae are normal. No scleral icterus.   Cardiovascular: Regular rhythm and normal heart sounds.   Pulmonary/Chest: Breath sounds normal. No respiratory distress.   Abdominal: Soft. Bowel sounds are normal. She exhibits no distension and no mass. There is no tenderness. There is no rebound and no guarding.   Soft, non-distended abdomen. Ostomy to RLQ.   Musculoskeletal: She exhibits no edema or tenderness.   Lymphadenopathy:     She has no cervical adenopathy.   Neurological: She is alert and oriented to person, place, and time.   Skin: Skin is warm. Capillary refill takes less than 2 seconds. She is not diaphoretic.   Port noted, c/d/i.   Psychiatric: She has a normal mood and affect. Her behavior is normal. Judgment and thought content normal.   Nursing note and vitals reviewed.      Significant Labs:   CBC:   Recent Labs   Lab 01/10/19  0455 01/11/19  0549   WBC 4.95 5.13   HGB 11.9* 11.2*   HCT 37.1 35.6*    288     CMP:   Recent Labs   Lab 01/10/19  0455 01/11/19  0549    140   K 4.1 4.1    106   CO2 26 28   GLU 98 90   BUN 9 8   CREATININE 0.7 0.7   CALCIUM 9.5 9.7   PROT 7.1 7.0   ALBUMIN 3.4* 3.4*   BILITOT 0.4 0.4   ALKPHOS 97 92   AST 16 16   ALT 11 8*   ANIONGAP 9 6*   EGFRNONAA >60.0 >60.0       Significant Imaging: I have reviewed all pertinent imaging results/findings within the past 24 hours.

## 2019-01-11 NOTE — DISCHARGE INSTRUCTIONS
Psychiatric Medication Management and Therapy    [facility] [address], [phone]    OR    [facility]: For An Appointment Call: [phone]  Address:  [address] [place] [address]

## 2019-01-11 NOTE — ASSESSMENT & PLAN NOTE
ASSESSMENT     Ivy Salgado is a 38 y.o. female with a past psychiatric history of depression and anxiety, who presented to the [facility] ED on 1/7/2019 due to palpitations. Workup continues for organic cause of palpitations, flushing, hypertensive episodes.  Repeat 24 hour urine metanephrines pending.      Patient with two episodes in last 24 hours, not relieved with benzodiazepine administration.    Regardless of etiology of these symptoms, feel patient could benefit from further psychiatric treatment.  Discussed with her the bidirectional nature of anxiety and increased autonomic response. CBT may be helpful in the outpatient setting.  We also discussed the pros and cons of starting longer acting anxiolytic/antidepressant agent, even in setting of prolonged QTc.  Patient given informed consent for starting low dose Mirtazapine for depression, anxiety, appetite stimulation, and sleep and is agreeable to trial.  Ideally the other QTc prolonging agents (oxycodone, phenergan, Ambien) could be weaned or minimized.  There is also the consideration that the Marinol may actually be worsening anxiety, nausea, vasodilation at this point and hasn't been helpful for appetite stimulation so would attempt to wean as well.      IMPRESSION  ARPITA  MDD  R/o panic disorder    RECOMMENDATION(S)     - Patient does not meet criteria for PEC or inpatient psychiatric hospitalization at this time.   - Start Remeron 7.5 mg nightly, typically has low effect on QTc.  - Continue Klonopin 0.5 mg BID PRN anxiety.    - Minimize use of other QTc prolonging agents if possible.    - Expresses interest in outpatient psychiatric and psychology follow up  She has been given number for [facility] but has not been able to reach anyone yet.  List of resources placed in discharge instructions in chart.

## 2019-01-11 NOTE — PHYSICIAN QUERY
PT Name: Ivy Salgado  MR #: [id number]     Physician Query Form - Etiology of Condition Clarification      CDS/: Pamella Freeman               Contact information:[email or web address]  This form is a permanent document in the medical record.     Query Date: January 11, 2019    By submitting this query, we are merely seeking further clarification of documentation.  Please utilize your independent clinical judgment when addressing the question(s) below.     The medical record contains the following:    Findings Supporting Clinical Information Location in Medical Record   Sinus tachycardia Sinus tachycardia [R00.0]Multiple episodes of flushing, tachycardia, diaphoresis, anxiety over the course of the year. Being worked up by endocrinology for  pheo vs carcinoid syndrome.Dr. Landry  recommended patient commence 24h urine metanephrine collection and follow up in clinic.   - Previous metanephrines, catecholamines, 5HIAA, chromogranin A levels unremarkable.   Catecholamine levels mildly above normal.   FT4,- normal   Hemodynamically stable.      Generalized anxiety disorder r/ o Panic attack - on Xanax PRN -   Had an episode of flushing with tachycardia  117 with SBP 150s this AM Telemetry with sinus tachycardia.    pn 1-8                                 pn 1-10     Please document your best medical opinion regarding the etiology of _Tachycardia for which treatment is/was directed.     Provider use only                   [  x] Clinically Undetermined     Please document in your progress notes daily for the duration of treatment, until resolved, and include in your discharge summary.

## 2019-01-11 NOTE — ASSESSMENT & PLAN NOTE
39yo woman w/a history of ARPITA, HTN, fistulizing Crohn's disease (remote history of enterovesicular fistula s/p remote takedown; s/p total colectomy w/end ileostomy; previously failed remicade, cimzia, humira, and stelara; currently on entyvio x3mos with last dose 12/26), and recurrent UTI's who was admitted on 1/7/2018 with flank pain, increased ostomy output, and palpitations due to ongoing uncontrolled IBD and a Klebsiella UTI, prompting ID consultation today for the latter. She is improved on empiric CTX that can be tailored today.    - would stop CTX  - would complete 7 day course with 3 additional days of augmentin for a complicated UTI  - no role for suppressive therapy at this time prior to evaluation of  tract for structural and functional abnormalities by urology given history of prior enterovesicular fistula -- this will only promote resistance at this time  - follow-up per urology given 3rd recurrence for additional evaluation

## 2019-01-11 NOTE — HPI
Ms. Salgado is a 39yo woman w/a history of ARPITA, HTN, fistulizing Crohn's disease (remote history of enterovesicular fistula s/p remote takedown; s/p total colectomy w/end ileostomy; previously failed remicade, cimzia, humira, and stelara; currently on entyvio x3mos with last dose 12/26), and recurrent UTI's who was admitted on 1/7/2018 with flank pain, increased ostomy output, and palpitations due to ongoing uncontrolled IBD and a Klebsiella UTI, prompting ID consultation today. Patient notes she had flank pain and mild dysuria consistent with prior UTI's shortly after admission. She has improved with 4 days of CTX so far. Of note, patient has been seen by urology due to recurrence of UTI's -- denies recent cystoscopy or urodynamic studies as she has just begun evaluation with their clinic.

## 2019-01-11 NOTE — PLAN OF CARE
Problem: Adult Inpatient Plan of Care  Goal: Plan of Care Review  Outcome: Ongoing (interventions implemented as appropriate)  Pain : Pt. c/o Abd pain once-managed per PRN pain med.   Pt. still having a liquid stool o/p-getting IVF.   Heart rate in 70's at rest. On ambulation to bathroom increasing to 130's.   Pt. also had an anxiety attack. Meds given-see MAR.   Infection: Pt. continue on IV Abx.   Safety and pt. care rounds maintained. Wctm.

## 2019-01-11 NOTE — SUBJECTIVE & OBJECTIVE
"Interval History: Patient reports having two episodes of palpitations, flushing, and anxiety overnight and this morning.  Per chart review, associated with tachycardia up to 140s and hypertension.  No other complaints today.  Discussed further history with patient.  She has been on Marinol 2.5 BID since February, increased to 5 mg BID in October.  Doesn't appear it has actually been helpful for appetite stimulation.  Reports taking Ambien for at least ten years PRN insomnia, at home typically twice weekly.  Taking oxycodone, typically 1/2 of a 10 mg tab BID.  Increased oxycodone needs over the past year secondary to worsening lower abdominal pain.  Patient also gives history of recurrent right ovarian cyst, despite supposed removal of right ovary and uterus years ago.      Patient intermittently tearful on exam today.  "I'm not crazy, I don't want them to think I'm crazy."  Very worried that something medically serious is wrong with her.  Denies SI/HI/AVH.  Does endorse automatic negative thoughts when physically symptomatic, as well as "what if" and "worst case scenario" type of thinking.      Family History     Problem Relation (Age of Onset)    Cancer Father, Maternal Grandfather    Colon cancer Father    Crohn's disease Brother    Endometrial cancer Maternal Aunt    Hypertension Mother    Skin cancer Maternal Grandfather        Tobacco Use    Smoking status: Never Smoker    Smokeless tobacco: Never Used   Substance and Sexual Activity    Alcohol use: Yes     Alcohol/week: 0.0 oz     Comment: Patient only drinks wine not regular basis.    Drug use: No    Sexual activity: Yes     Partners: Male     Birth control/protection: Pill, Surgical, Condom     Comment: [facility]     Psychotherapeutics (From admission, onward)    Start     Stop Route Frequency Ordered    01/07/19 2217  zolpidem tablet 5 mg      -- Oral Nightly PRN 01/07/19 2117           Review of Systems  Objective:     Vital Signs (Most Recent):  Temp: " "99.5 °F (37.5 °C) (01/11/19 1121)  Pulse: 100 (01/11/19 1127)  Resp: 18 (01/11/19 1124)  BP: 137/86 (01/11/19 1124)  SpO2: 99 % (01/11/19 1124) Vital Signs (24h Range):  Temp:  [98 °F (36.7 °C)-99.5 °F (37.5 °C)] 99.5 °F (37.5 °C)  Pulse:  [] 100  Resp:  [11-18] 18  SpO2:  [94 %-99 %] 99 %  BP: (103-170)/() 137/86     Height: 5' 1" (154.9 cm)  Weight: 42.2 kg (93 lb)  Body mass index is 17.57 kg/m².      Intake/Output Summary (Last 24 hours) at 1/11/2019 1259  Last data filed at 1/11/2019 1100  Gross per 24 hour   Intake 1100 ml   Output 2330 ml   Net -1230 ml       Physical Exam        Physical Exam   Psychiatric:   Appearance: well developed thin female wearing casual clothing in NAD  Behavior:  calm, cooperative  Speech:  normal rate, tone, and volume  Mood: "about the same"  Affect: tearful  Thought Process:  linear  Thought Perceptions:  denied AVH  Thought Content:  denied SI, HI; no delusions apparent  Sensorium:  awake, alert  Attention/Concentration:  intact to conversation  Orientation:  person, place, time, and situation  Memory:  intact (recent, remote)  Abstraction:  intact   Insight:  fair  Judgment:  good      Significant Labs:   Last 24 Hours:   Recent Lab Results       01/11/19  0549        Immature Granulocytes 0.0     Immature Grans (Abs) 0.00  Comment:  Mild elevation in immature granulocytes is non specific and   can be seen in a variety of conditions including stress response,   acute inflammation, trauma and pregnancy. Correlation with other   laboratory and clinical findings is essential.       Albumin 3.4     Alkaline Phosphatase 92     ALT 8     Anion Gap 6     AST 16     Baso # 0.03     Basophil% 0.6     Total Bilirubin 0.4  Comment:  For infants and newborns, interpretation of results should be based  on gestational age, weight and in agreement with clinical  observations.  Premature Infant recommended reference ranges:  Up to 24 hours.............<8.0 mg/dL  Up to 48 " hours............<12.0 mg/dL  3-5 days..................<15.0 mg/dL  6-29 days.................<15.0 mg/dL       BUN, Bld 8     Calcium 9.7     Chloride 106     CO2 28     Creatinine 0.7     Differential Method Automated     eGFR if African American >60.0     eGFR if non  >60.0  Comment:  Calculation used to obtain the estimated glomerular filtration  rate (eGFR) is the CKD-EPI equation.        Eos # 0.1     Eosinophil% 1.6     Glucose 90     Gran # (ANC) 1.5     Gran% 28.4     Hematocrit 35.6     Hemoglobin 11.2     Lymph # 2.9     Lymph% 56.9     Magnesium 1.9     MCH 27.3     MCHC 31.5     MCV 87     Mono # 0.6     Mono% 12.5     MPV 9.9     nRBC 0     Phosphorus 4.6     Platelets 288     Potassium 4.1     Total Protein 7.0     RBC 4.11     RDW 15.2     Sodium 140     WBC 5.13           Significant Imaging: None

## 2019-01-11 NOTE — PROGRESS NOTES
Progress Note  Hospital Medicine    Admit Date: 1/7/2019  Length of Stay:  LOS: 4 days     SUBJECTIVE:         Follow-up For:  Sinus tachycardia    HPI/Interval history (See H&P for complete P,F,SHx) :      urine cultures - klebsiella sensitive to ceftriaxone. s/p  psychiatry consulted for possible panic attacks/ depression - PRN Klonopin 0.5mg BID symptomatic relief. started on remeron   . started on RL hydration - 100ml /hr for orthostais - for increased ostomy output - started on loperamide QID and lomotil TID- stool for Clost diff assay negative. GI and ID Consulted       Review of Systems: List if applicable  Review of Systems   Constitutional: Positive for diaphoresis and unexpected weight change (7lb weight loss). Negative for activity change, appetite change, chills, fatigue and fever.   Eyes: Negative for photophobia and visual disturbance.   Respiratory: Negative for apnea, cough, choking, shortness of breath, wheezing and stridor.    Cardiovascular: Positive for palpitations.(resolved)  Negative for chest pain and leg swelling.   Gastrointestinal: Negative for abdominal pain, blood in stool, constipation, diarrhea, nausea and vomiting.   Endocrine: Negative for cold intolerance and heat intolerance.   Genitourinary: Positive for frequency. Negative for difficulty urinating.   Musculoskeletal: Negative for arthralgias and myalgias.   Skin: Negative for rash and wound.   Allergic/Immunologic: Negative for immunocompromised state.   Neurological: Negative for dizziness, weakness and headaches.   Psychiatric/Behavioral: Negative for agitation, behavioral problems and confusion.       OBJECTIVE:     Vital Signs Range (Last 24H):  Temp:  [98 °F (36.7 °C)-99.5 °F (37.5 °C)]   Pulse:  []   Resp:  [11-18]   BP: (103-170)/()   SpO2:  [94 %-99 %]     Physical Exam:  Physical Exam   Constitutional: She is oriented to person, place, and time. She appears well-developed and well-nourished. No distress.    Thin, tremulous   HENT:   Head: Normocephalic and atraumatic.   Mouth/Throat: No oropharyngeal exudate.   Eyes: Conjunctivae and EOM are normal. Pupils are equal, round, and reactive to light. No scleral icterus.   Neck: Normal range of motion. Neck supple. No tracheal deviation present. No thyromegaly present.   Cardiovascular: Regular rhythm, normal heart sounds and intact distal pulses. Tachycardia present.   No murmur heard.  Pulmonary/Chest: Effort normal and breath sounds normal. No respiratory distress. She has no wheezes. She has no rales. She exhibits no tenderness.   Abdominal: Soft. Bowel sounds are normal. She exhibits no distension. There is no tenderness. There is no rebound and no guarding.   +ve Ileostomy   Musculoskeletal: Normal range of motion. She exhibits no edema or tenderness.   Lymphadenopathy:     She has no cervical adenopathy.   Neurological: She is alert and oriented to person, place, and time.   Skin: Skin is warm and dry. No rash noted. She is not diaphoretic. No erythema. No pallor.   Psychiatric: She has a normal mood and affect. Her behavior is normal. Judgment and thought content normal.        CN III, IV, VI   Pupils are equal, round, and reactive to light.  Extraocular motions are normal.                Medications:  Medication list was reviewed and changes noted under Assessment/Plan.      Current Facility-Administered Medications:     acetaminophen tablet 650 mg, 650 mg, Oral, Q8H PRN, Charlette Santana MD, 650 mg at 01/10/19 1815    amoxicillin-clavulanate 875-125mg per tablet 1 tablet, 1 tablet, Oral, Q12H, Inga Briones MD    clonazePAM tablet 0.5 mg, 0.5 mg, Oral, BID PRN, Se Kolb MD, 0.5 mg at 01/11/19 1131    diphenoxylate-atropine 2.5-0.025 mg per tablet 1 tablet, 1 tablet, Oral, TID WM, Se Kolb MD    dronabinol capsule 5 mg, 5 mg, Oral, BID AC, Charlette Santana MD, 5 mg at 01/11/19 0556    lactated ringers infusion, ,  Intravenous, Continuous, Se Kolb MD, Last Rate: 75 mL/hr at 01/10/19 1644    loperamide capsule 2 mg, 2 mg, Oral, QID, Se Kolb MD    magnesium oxide tablet 400 mg, 400 mg, Oral, Daily, Charlette Santana MD, 400 mg at 01/11/19 0900    mirtazapine tablet 7.5 mg, 7.5 mg, Oral, QHS, Se Kolb MD    oxyCODONE-acetaminophen  mg per tablet 1 tablet, 1 tablet, Oral, Q6H PRN, Charlette Santana MD, 1 tablet at 01/11/19 0859    pantoprazole EC tablet 40 mg, 40 mg, Oral, BID AC, Se Kolb MD, 40 mg at 01/11/19 1118    potassium citrate SR tablet 30 mEq, 30 mEq, Oral, BID, Charlette Santana MD, 30 mEq at 01/11/19 0859    promethazine tablet 25 mg, 25 mg, Oral, Q6H PRN, Charlette Santana MD, 25 mg at 01/11/19 0859    sodium chloride 0.9% flush 5 mL, 5 mL, Intravenous, PRN, Charlette Santana MD    zolpidem tablet 5 mg, 5 mg, Oral, Nightly PRN, Charlette Santana MD, 5 mg at 01/10/19 2310    acetaminophen, clonazePAM, oxyCODONE-acetaminophen, promethazine, sodium chloride 0.9%, zolpidem    Laboratory/Diagnostic Data:  Reviewed and noted in plan where applicable- Please see chart for full lab data.    Recent Labs   Lab 01/09/19  0706 01/10/19  0455 01/11/19  0549   WBC 5.21 4.95 5.13   HGB 12.6 11.9* 11.2*   HCT 39.6 37.1 35.6*    296 288       Recent Labs   Lab 01/09/19  0706 01/10/19  0455 01/11/19  0549    140 140   K 4.3 4.1 4.1    105 106   CO2 25 26 28   BUN 8 9 8   CREATININE 0.7 0.7 0.7    98 90   CALCIUM 9.7 9.5 9.7   MG 2.2 1.9 1.9   PHOS  --   --  4.6*       Recent Labs   Lab 01/09/19  0706 01/10/19  0455 01/11/19  0549   ALKPHOS 110 97 92   ALT 12 11 8*   AST 16 16 16   ALBUMIN 3.7 3.4* 3.4*   PROT 7.8 7.1 7.0   BILITOT 0.4 0.4 0.4        Microbiology labs for the last week  Microbiology Results (last 7 days)     Procedure Component Value Units Date/Time    Stool culture [[id number]] Collected:   01/09/19 1730    Order Status:  Completed Specimen:  Stool Updated:  01/11/19 1447     Stool Culture Nothing significant to date    Blood Culture #2 **CANNOT BE ORDERED STAT** [[id number]] Collected:  01/07/19 1717    Order Status:  Completed Specimen:  Blood from Peripheral, Wrist, Left Updated:  01/10/19 1812     Blood Culture, Routine No Growth to date     Blood Culture, Routine No Growth to date     Blood Culture, Routine No Growth to date     Blood Culture, Routine No Growth to date    Blood Culture #1 **CANNOT BE ORDERED STAT** [[id number]] Collected:  01/07/19 1712    Order Status:  Completed Specimen:  Blood from Peripheral, Forearm, Left Updated:  01/10/19 1812     Blood Culture, Routine No Growth to date     Blood Culture, Routine No Growth to date     Blood Culture, Routine No Growth to date     Blood Culture, Routine No Growth to date    Urine culture [[id number]]  (Susceptibility) Collected:  01/07/19 1723    Order Status:  Completed Specimen:  Urine Updated:  01/10/19 1549     Urine Culture, Routine --     KLEBSIELLA PNEUMONIAE  >100,000 cfu/ml      Narrative:       add on test urine toxicology per dr nohemy marks order #[id number]   01/07/2019  22:58     Clostridium difficile EIA [[id number]] Collected:  01/09/19 1730    Order Status:  Completed Specimen:  Stool Updated:  01/10/19 0531     C. diff Antigen Negative     C difficile Toxins A+B, EIA Negative     Comment: Testing not recommended for children <24 months old.       E. coli 0157 antigen [[id number]] Collected:  01/09/19 1730    Order Status:  No result Specimen:  Stool Updated:  01/09/19 2340           Imaging Results          X-Ray Chest PA And Lateral (Final result)  Result time 01/07/19 17:34:50    Final result by Suzi Martinez MD (01/07/19 17:34:50)                 Impression:      No acute cardiopulmonary abnormality.      Electronically signed by: Suzi Martinez  Date:    01/07/2019  Time:    17:34             Narrative:     "EXAMINATION:  XR CHEST PA AND LATERAL    CLINICAL HISTORY:  Fever, unspecified    TECHNIQUE:  PA and lateral views of the chest were performed.    COMPARISON:  Multiple priors, most recent 12/11/2018    FINDINGS:  Stable positioning of a left chest wall port catheter.The lungs are clear.  No focal consolidation, pleural effusion or pneumothorax.    Normal cardiomediastinal contour.    No acute or aggressive osseous abnormality. Scoliosis.                                Estimated body mass index is 17.57 kg/m² as calculated from the following:    Height as of this encounter: 5' 1" (1.549 m).    Weight as of this encounter: 42.2 kg (93 lb).    I & O (Last 24H):    Intake/Output Summary (Last 24 hours) at 1/11/2019 1732  Last data filed at 1/11/2019 1400  Gross per 24 hour   Intake 980 ml   Output 2410 ml   Net -1430 ml       Estimated Creatinine Clearance: 74 mL/min (based on SCr of 0.7 mg/dL).    ASSESSMENT/PLAN:     Active Problems:      Active Hospital Problems    Diagnosis  POA    *Sinus tachycardia [R00.0]Multiple episodes of flushing, tachycardia, diaphoresis, anxiety over the course of the year. Being worked up by endocrinology for pheo vs carcinoid syndrome.Dr. Landry  recommended patient commence 24h urine metanephrine collection and follow up in clinic.   - Previous metanephrines, catecholamines, 5HIAA, chromogranin A levels unremarkable. 24hr urine metanephrines pending. not taking metoprolol as per endocrine advice   Catecholamine levels mildly above normal.  FT4,- normal   Hemodynamically stable.         Generalized anxiety disorder [F41.1] r/ o Panic attack - on Xanax PRN -   Had an episode of flushing with tachycardia  117 with SBP 150s this AM Telemetry with sinus tachycardia. c/o dysuria and left flank resolved now. urine cultures -gram negative rods. tearful and admits to depression psychiatry consulted for possible panic attacks/ depression . admits stressors at home. previosly on celexa but " discontinued dut prolonged QT. no psyc follow up since  1year  s/p psych eval on 1/10/19 - xanax discontinued  PRN Klonopin 0.5mg BID symptomatic relief. Avoid SSRI or serotonergic agents for now.        Weight loss - 17 lb in 1 year. Nutrition consulted.started on remeron . Marinol may actually be worsening anxiety, nausea, vasodilation at this point and hasn't been helpful for appetite stimulatio. plan to wean off     Diarrhea/ increased ostomy output /orthostasis -  .    . started on RL hydration - 100ml /hr for orthostais - for increased ostomy output - started on loperamide QID and lomotil TID- stool for Clost diff assay negative. GI and ID Consulted       Yes    Hypomagnesemia [E83.42]replaced   Unknown    Anemia [D64.9]Hb 10.2 normocytis. stable. monitor   Unknown    Urinary tract infection with hematuria [N39.0, R31.9]/ pyelonerphritis -  urine cultures - klebsiella sensitive to ceftriaxone. Follows w/ Urology OP. ID consulted   Yes    Hypokalemia [E87.6]repalced   Yes    Appetite impaired [R63.0]on Dronabinol . as above   Yes    Crohn's disease of small intestine [K50.00]    Chronic, stable, s/p ileostomy. Output about the same.   Continue lomotil/loperamide prn.   Continue Oxy IR for pain relief, prn. on entyvio      Yes     Chronic      Yes      Resolved Hospital Problems   No resolved problems to display.         Disposition- home    DVT prophylaxis addressed with:[facility]             Se Kolb MD  Attending Staff Physician  [facility]  pager- [phone]  [employer] - [phone]

## 2019-01-11 NOTE — HPI
Ms Salgado is a 38 year old woman with history of chron's disease s/p total colectomy with end ileostomy, HTN, anxiety, who is presenting to the hospital due to persistent palpitations, GI consulted due to increased ostomy output.    She has history of Crohn's disease followed by Dr. Ross. She has been on multiple biological agents in the past including Remicade, Cimzia, Humira, and Stelara. She recently started Entyvio, completing her 3rd induction dose 1 week ago; next dose due 2/26. She notes that she has not noted a great change in her symptoms (predominant symptoms is intermittent stabbing lower abdominal pain and ostomy output). She notes that she has had issues with UTI since starting entyvio and is currently being treated for her third UTI.    She was seen in the hospital (8/30 - 9/7) with palpitations and was seen in the hospital by Gi service due to increased ostomy output. She was started on budesonide with some improvement. She notes that she feels the ostomy output has been increased for ~1year since starting stelara, but has not resolved with discontinuation of stelara or with starting of entyvio. She underwent an MR enterography (8/31) with short segment of distal ileum non-uniform wall thickening (1cm). She last had ileoscopy 8/8/17 with unremarkable examination but limited distance evaluated due to tortuosity (likely due to underlying adhesions).    She was seen in clinic 12/25 with Dr. Ross due to increased ostomy output which was foul smelling with negative c.diff. She notes since then her symptoms have been persistent. She feels that every time she eats she will fill up the bag 3x of watery output within 1 hour. She takes imodium and lomotil with some effect. Although we are consulted for increased output she notes that the output has not recently changed. If she does not eat she notes she will still have some output but decreased. She is concerned due to decreased weight (93lb from baseline 110  ~1.5yr ago). Denies any new symptoms on review of systems (see below).

## 2019-01-11 NOTE — CONSULTS
[facility]  Gastroenterology  Consult Note    Patient Name: Ivy Salgado  MRN: [id number]  Admission Date: 1/7/2019  Hospital Length of Stay: 4 days  Code Status: Prior   Attending Provider: Se Kolb MD   Consulting Provider: Paul Loya MD  Primary Care Physician: Kalia Astorga MD  Principal Problem:Sinus tachycardia    Inpatient consult to Gastroenterology  Consult performed by: Paul Loya MD  Consult ordered by: Se Kolb MD        Subjective:     HPI:  Ms Salgado is a 38 year old woman with history of chron's disease s/p total colectomy with end ileostomy, HTN, anxiety, who is presenting to the hospital due to persistent palpitations, GI consulted due to increased ostomy output.    She has history of Crohn's disease followed by Dr. Ross. She has been on multiple biological agents in the past including Remicade, Cimzia, Humira, and Stelara. She recently started Entyvio, completing her 3rd induction dose 1 week ago; next dose due 2/26. She notes that she has not noted a great change in her symptoms (predominant symptoms is intermittent stabbing lower abdominal pain and ostomy output). She notes that she has had issues with UTI since starting entyvio and is currently being treated for her third UTI.    She was seen in the hospital (8/30 - 9/7) with palpitations and was seen in the hospital by Gi service due to increased ostomy output. She was started on budesonide with some improvement. She notes that she feels the ostomy output has been increased for ~1year since starting stelara, but has not resolved with discontinuation of stelara or with starting of entyvio. She underwent an MR enterography (8/31) with short segment of distal ileum non-uniform wall thickening (1cm). She last had ileoscopy 8/8/17 with unremarkable examination but limited distance evaluated due to tortuosity (likely due to underlying adhesions).    She was seen in clinic 12/25 with Dr. Ross  due to increased ostomy output which was foul smelling with negative c.diff. She notes since then her symptoms have been persistent. She feels that every time she eats she will fill up the bag 3x of watery output within 1 hour. She takes imodium and lomotil with some effect. Although we are consulted for increased output she notes that the output has not recently changed. If she does not eat she notes she will still have some output but decreased. She is concerned due to decreased weight (93lb from baseline 110 ~1.5yr ago). Denies any new symptoms on review of systems (see below).       Past Medical History:   Diagnosis Date    Abnormal Pap smear 2007    Abnormal Pap smear 5/26/2011    Anemia     Anxiety     Arthritis     C. difficile diarrhea     Crohn's disease     Depression 8/5/2017    Encounter for blood transfusion     Genital HSV     History of colposcopy with cervical biopsy 2007 and 7/2011 2007-LYLA I  and 7/2011- LYLA I    Hypertension     Kidney stone     Kidney stone     Recurrent UTI 4/3/2013    S/P ileostomy 7/9/2012    Sterilization 6/23/2012       Past Surgical History:   Procedure Laterality Date    ABDOMINAL SURGERY      APPENDECTOMY      BLADDER SURGERY      partial cystectomy due to fistula    CKC      COLON SURGERY      COLONOSCOPY      EGD (ESOPHAGOGASTRODUODENOSCOPY) N/A 9/6/2013    Performed by [facility] at [facility] ENDO (4TH FLR)    ESOPHAGOGASTRODUODENOSCOPY (EGD) N/A 10/14/2016    Performed by Janelle Mcpherson MD at [facility] ENDO (2ND FLR)    ESOPHAGOGASTRODUODENOSCOPY (EGD) N/A 10/23/2014    Performed by Nathanael Mitchell MD at [facility] ENDO (2ND FLR)    EXAM UNDER ANESTHESIA N/A 8/29/2013    Performed by Bob Parsons [facility] at [facility] OR 2ND FLR    EXAM UNDER ANESTHESIA N/A 1/18/2013    Performed by [facility] at [facility] OR 2ND FLR    HYSTERECTOMY-ABDOMINAL-TOTAL (SHELLIE) N/A 4/16/2015    [facility]    ILEOSCOPY N/A  8/8/2017    Performed by Tommie Klein MD at [facility] ENDO (2ND FLR)    ILEOSCOPY N/A 10/14/2016    Performed by Janelle [facility]pherson MD at [facility] ENDO (2ND FLR)    ILEOSCOPY N/A 9/25/2013    Performed by Emilio Cameron [facility] at [facility] ENDO (4TH FLR)    ILEOSTOMY      INCISION AND DRAINAGE, ABSCESS N/A 8/29/2013    Performed by Bob Parsons MD at [facility] OR 2ND FLR    WCVEJPEUG-CCPP-O-CATH Left 2/21/2017    Performed by Parish Sanchez Jr., MD at [facility] OR 2ND FLR    OOPHORECTOMY Right 04/16/2015    PORTACATH PLACEMENT  02/21/2017    REPAIR-BLADDER  4/16/2015    [facility] OR    SALPINGO-OOPHERECTOMY Right 4/16/2015    Performed by [facility] at [facility] OR    SIGMOIDOSCOPY, FLEXIBLE N/A 2/7/2014    Performed by Erasto Sewell MD at [facility] ENDO (2ND FLR)    SKIN BIOPSY      SMALL INTESTINE SURGERY      TOTAL ABDOMINAL HYSTERECTOMY  04/16/2015    TOTAL COLECTOMY      TUBAL LIGATION  06/06/2012    UPPER GASTROINTESTINAL ENDOSCOPY         Review of patient's allergies indicates:   Allergen Reactions    Azathioprine sodium Other (See Comments)     Other reaction(s): pancreatitis  Other reaction(s): pancreatitis    Methotrexate analogues Other (See Comments)     leukopenia    Stelara [ustekinumab] Other (See Comments)     Multiple infections    Zofran [ondansetron hcl (pf)] Other (See Comments)     Per patient causes prolong QT    Vancomycin analogues Hives    Morphine Itching and Other (See Comments)     Other reaction(s): Itching    Bactrim [sulfamethoxazole-trimethoprim] Palpitations    Ciprofloxacin Palpitations     Family History     Problem Relation (Age of Onset)    Cancer Father, Maternal Grandfather    Colon cancer Father    Crohn's disease Brother    Endometrial cancer Maternal Aunt    Hypertension Mother    Skin cancer Maternal Grandfather        Tobacco Use    Smoking status: Never Smoker    Smokeless tobacco: Never Used   Substance and Sexual Activity     Alcohol use: Yes     Alcohol/week: 0.0 oz     Comment: Patient only drinks wine not regular basis.    Drug use: No    Sexual activity: Yes     Partners: Male     Birth control/protection: Pill, Surgical, Condom     Comment: HYST     Review of Systems   Constitutional: Positive for unexpected weight change. Negative for activity change, appetite change (endorses good appetite but BM with any PO intake), chills, fatigue and fever.   HENT: Negative for sore throat and trouble swallowing.    Eyes: Negative for visual disturbance.   Respiratory: Negative for cough and shortness of breath.    Gastrointestinal: Positive for abdominal pain (intermittent stabbing lower abdominal pain at baseline. currently no pain.) and diarrhea (increased ostomy output (not changed recently)). Negative for abdominal distention, anal bleeding (no blood in ostomy), blood in stool, constipation, nausea and vomiting.   Genitourinary: Negative for dysuria.   Musculoskeletal: Negative for arthralgias and myalgias.   Skin: Negative for rash.   Neurological: Negative for dizziness, light-headedness and headaches.   Psychiatric/Behavioral: Negative for agitation and confusion.     Objective:     Vital Signs (Most Recent):  Temp: 98.6 °F (37 °C) (01/11/19 0858)  Pulse: (!) 124 (01/11/19 0905)  Resp: 15 (01/11/19 0905)  BP: (!) 170/102 (01/11/19 0905)  SpO2: (!) 94 % (01/11/19 0905) Vital Signs (24h Range):  Temp:  [98 °F (36.7 °C)-98.8 °F (37.1 °C)] 98.6 °F (37 °C)  Pulse:  [] 124  Resp:  [11-18] 15  SpO2:  [94 %-98 %] 94 %  BP: (103-170)/() 170/102     Weight: 42.2 kg (93 lb) (01/07/19 2113)  Body mass index is 17.57 kg/m².      Intake/Output Summary (Last 24 hours) at 1/11/2019 0941  Last data filed at 1/11/2019 0600  Gross per 24 hour   Intake 1600 ml   Output 2110 ml   Net -510 ml       Lines/Drains/Airways     Central Venous Catheter Line                 Port A Cath Single Lumen -- days         Port A Cath Single Lumen -- days           Drain                 Ileostomy RLQ -- days         Ileostomy 06/16/12 0000 RLQ 2400 days                Physical Exam   Constitutional: She is oriented to person, place, and time. She appears well-developed and well-nourished. No distress.   Asleep easily awoken. Appears comfortable in no distress. Friendly and talkative.   HENT:   Head: Normocephalic and atraumatic.   Mouth/Throat: No oropharyngeal exudate.   Eyes: Conjunctivae are normal. No scleral icterus.   Cardiovascular: Regular rhythm and normal heart sounds.   Tachycardic   Pulmonary/Chest: Breath sounds normal. No respiratory distress.   Abdominal: Soft. Bowel sounds are normal. She exhibits no distension and no mass. There is no tenderness. There is no rebound and no guarding.   Soft, non-distended abdomen. Ostomy to RLQ.   Musculoskeletal: She exhibits no edema or tenderness.   Lymphadenopathy:     She has no cervical adenopathy.   Neurological: She is alert and oriented to person, place, and time.   Skin: Skin is warm. Capillary refill takes less than 2 seconds. She is not diaphoretic.   Unremarkable port-o-cath site   Psychiatric: She has a normal mood and affect. Her behavior is normal. Judgment and thought content normal.   Nursing note and vitals reviewed.      Significant Labs:  CBC:   Recent Labs   Lab 01/10/19  0455 01/11/19  0549   WBC 4.95 5.13   HGB 11.9* 11.2*   HCT 37.1 35.6*    288     CMP:   Recent Labs   Lab 01/11/19  0549   GLU 90   CALCIUM 9.7   ALBUMIN 3.4*   PROT 7.0      K 4.1   CO2 28      BUN 8   CREATININE 0.7   ALKPHOS 92   ALT 8*   AST 16   BILITOT 0.4     Coagulation: No results for input(s): PT, INR, APTT in the last 48 hours.    Significant Imaging:  Imaging results within the past 24 hours have been reviewed.    Assessment/Plan:     Crohn's disease of small intestine    Ms Salgado is a 38 year old woman with history of chron's disease s/p total colectomy with end ileostomy, HTN, anxiety, who is  presenting to the hospital due to persistent palpitations, GI consulted due to increased ostomy output.    Follows with Dr. Ross, previously on multiple medications including Remicade, Cimzia, Humira, and Stelara. She recently started Entyvio, completing her 3rd induction dose 1 week ago; next dose due 2/26. Entyvio complicated by UTI (3rd currently on admission).    She has persistent high ileostomy output for ~1 year with PO intake. She is on outpatient intermittent infusion via port (q1-2 week) and is on lomotil and loperamide with some effect. Negative c.diff on admission.    Plan  - continues on entyvio for Crohn's disease for ?short segment of ileal activity  - continue symptomatic management with imodium and lomotil  - protonix 40mg BID to decrease gastric fluid production  - do not recommend steroids given minimal improvement previously with budesonide and concern for UTI  - follow-up pending stool culture and E coli. Given prolonged nature of diarrhea very unlikely to be infectious etiology  - follow-up with GI as outpatient         Thank you for your consult. I will follow-up with patient. Please contact us if you have any additional questions.    Paul Loya MD  Gastroenterology  [facility]

## 2019-01-11 NOTE — ASSESSMENT & PLAN NOTE
Ms Salgado is a 38 year old woman with history of chron's disease s/p total colectomy with end ileostomy, HTN, anxiety, who is presenting to the hospital due to persistent palpitations, GI consulted due to increased ostomy output.    Follows with Dr. Ross, previously on multiple medications including Remicade, Cimzia, Humira, and Stelara. She recently started Entyvio, completing her 3rd induction dose 1 week ago; next dose due 2/26. Entyvio complicated by UTI (3rd currently on admission).    She has persistent high ileostomy output for ~1 year with PO intake. She is on outpatient intermittent infusion via port (q1-2 week) and is on lomotil and loperamide with some effect. Negative c.diff on admission.    Plan  - continues on entyvio for Crohn's disease for ?short segment of ileal activity  - continue symptomatic management with imodium and lomotil  - do not recommend steroids given minimal improvement previously with budesonide and concern for UTI  - follow-up pending stool culture and E coli. Given prolonged nature of diarrhea very unlikely to be infectious etiology  - follow-up with GI as outpatient
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
## 2019-01-11 NOTE — PHYSICIAN QUERY
PT Name: Ivy Salgado  MR #: [id number]    Physician Query Form - Nutrition Clarification     CDS/: Pamella Freeman               Contact information:[email or web address]     This form is a permanent document in the medical record.     Query Date: January 11, 2019    By submitting this query, we are merely seeking further clarification of documentation.. Please utilize your independent clinical judgment when addressing the question(s) below.    The Medical record contains the following:   Indicators  Supporting Clinical Findings Location in Medical Record    % of Estimated Energy Intake over a time frame from p.o., TF, or TPN Appetite impaired  H&P   x Weight Status over a time frame Reports wt loss but actual wt loss closer to 5-10lb rather than reported 17lb   RD cn 1-10   x Subcutaneous Fat and/or Muscle Loss Well-nourished triceps, mild wasting of scapula, clavicles,  lower extremities.        Fluid Accumulation or Edema      Reduced  Strength     x Wt / BMI / Usual Body Weight Wt=93  BMI=17.6 RD cn 1-10    Delayed Wound Healing / Failure to Thrive     x Acute or Chronic Illness Crohn's disease of small intestine s/p ileostomy H&P   x Medication dronabinol H&P   x Treatment 1. Order Boost Breeze to aid in nutrient intake.      RD cn 1-10   x Other 1. Order Boost Breeze to aid in nutrient intake.   2. Continue current regular diet and encourage meal/fluid intake as pt likely with malabsorption. Pt shows signs of moderate acute malnutrition due to PO intake PTA, wt loss, and physical exam.   3. RD following   RD cn 1-10     AND / ASPEN Clinical Characteristics (October 2011)  A minimum of two characteristics is recommended for diagnosing either moderate or severe malnutrition   Mild Malnutrition Moderate Malnutrition Severe Malnutrition   Energy Intake from p.o., TF or TPN. < 75% intake of estimated energy needs for less than 7 days < 75% intake of estimated energy needs for greater  than 7 days < 50% intake of estimated energy needs for > 5 days   Weight Loss 1-2% in 1 month  5% in 3 months  7.5% in 6 months  10% in 1 year 1-2 % in 1 week  5% in 1 month  7.5% in 3 months  10% in 6 months  20% in 1 year > 2% in 1 week  > 5% in 1 month  > 7.5% in 3 months  > 10% in 6 months  > 20% in 1 year   Physical Findings     None *Mild subcutaneous fat and/or muscle loss  *Mild fluid accumulation  *Stage II decubitus  *Surgical wound or non-healing wound *Mod/severe subcutaneous fat and/or muscle loss  *Mod/severe fluid accumulation  *Stage III or IV decubitus  *Non-healing surgical wound     Provider, please specify diagnosis or diagnoses associated with above clinical findings.    [  ] Mild Protein-Calorie Malnutrition   [  x] Moderate Protein-Calorie Malnutrition   [  ] Other Nutritional Diagnosis (please specify):    [  ] Other:    [  ] Clinically Undetermined       Please document in your progress notes daily for the duration of treatment until resolved and include in your discharge summary.

## 2019-01-11 NOTE — PLAN OF CARE
01/11/19 1133   Discharge Reassessment   Assessment Type Discharge Planning Reassessment   Discharge Plan A Home with family   Discharge Plan B Home with family   Discussed follow up needed with psychiatry.  Patient states she has tried for a year to find a psychiatrist that accepts Medicaid.  Psychiatry MD came in room while discussing and she is contacting clinic manager to see if patient can follow up in her clinic.

## 2019-01-11 NOTE — CONSULTS
[facility]  Infectious Disease  Consult Note    Patient Name: Ivy Salgado  MRN: [id number]  Admission Date: 1/7/2019  Hospital Length of Stay: 4 days  Attending Physician: Se Kolb MD  Primary Care Provider: Kalia Astorga MD     Isolation Status: No active isolations    Patient information was obtained from patient and past medical records.      Inpatient consult to Infectious Diseases  Consult performed by: Inga Briones MD  Consult ordered by: Se Kolb MD  Reason for consult: UTI in Crohn's pt on IS therapy        Assessment/Plan:     Urinary tract infection with hematuria    37yo woman w/a history of ARPITA, HTN, fistulizing Crohn's disease (remote history of enterovesicular fistula s/p remote takedown; s/p total colectomy w/end ileostomy; previously failed remicade, cimzia, humira, and stelara; currently on entyvio x3mos with last dose 12/26), and recurrent UTI's who was admitted on 1/7/2018 with flank pain, increased ostomy output, and palpitations due to ongoing uncontrolled IBD and a Klebsiella UTI, prompting ID consultation today for the latter. She is improved on empiric CTX that can be tailored today.    - would stop CTX  - would complete 7 day course with 3 additional days of augmentin for a complicated UTI  - no role for suppressive therapy at this time prior to evaluation of  tract for structural and functional abnormalities by urology given history of prior enterovesicular fistula -- this will only promote resistance at this time  - follow-up per urology given 3rd recurrence for additional evaluation         Thank you for your consult. I will sign off. Please contact us if you have any additional questions.     Inga Briones MD  Transplant ID Attending  [phone]    Inga Briones MD  Infectious Disease  [facility]    Subjective:     Principal Problem: Sinus tachycardia    HPI: Ms. Salgado is a 37yo woman w/a history of ARPITA, HTN,  fistulizing Crohn's disease (remote history of enterovesicular fistula s/p remote takedown; s/p total colectomy w/end ileostomy; previously failed remicade, cimzia, humira, and stelara; currently on entyvio x3mos with last dose 12/26), and recurrent UTI's who was admitted on 1/7/2018 with flank pain, increased ostomy output, and palpitations due to ongoing uncontrolled IBD and a Klebsiella UTI, prompting ID consultation today. Patient notes she had flank pain and mild dysuria consistent with prior UTI's shortly after admission. She has improved with 4 days of CTX so far. Of note, patient has been seen by urology due to recurrence of UTI's -- denies recent cystoscopy or urodynamic studies as she has just begun evaluation with their clinic.    Past Medical History:   Diagnosis Date    Abnormal Pap smear 2007    Abnormal Pap smear 5/26/2011    Anemia     Anxiety     Arthritis     C. difficile diarrhea     Crohn's disease     Depression 8/5/2017    Encounter for blood transfusion     Genital HSV     History of colposcopy with cervical biopsy 2007 and 7/2011 2007-LYLA I  and 7/2011- LYLA I    Hypertension     Kidney stone     Kidney stone     Recurrent UTI 4/3/2013    S/P ileostomy 7/9/2012    Sterilization 6/23/2012       Past Surgical History:   Procedure Laterality Date    ABDOMINAL SURGERY      APPENDECTOMY      BLADDER SURGERY      partial cystectomy due to fistula    CKC      COLON SURGERY      COLONOSCOPY      EGD (ESOPHAGOGASTRODUODENOSCOPY) N/A 9/6/2013    Performed by [facility]ilio Cameron MD at [facility] ENDO (4TH FLR)    ESOPHAGOGASTRODUODENOSCOPY (EGD) N/A 10/14/2016    Performed by Janelle Mcpherson MD at [facility] ENDO (2ND FLR)    ESOPHAGOGASTRODUODENOSCOPY (EGD) N/A 10/23/2014    Performed by Nathanael Mitchell MD at [facility] ENDO (2ND FLR)    EXAM UNDER ANESTHESIA N/A 8/29/2013    Performed by Bob Parsons MD at [facility] OR 2ND FLR    EXAM UNDER ANESTHESIA N/A 1/18/2013    Performed by  Bob Parsons MD at [facility] OR 2ND FLR    HYSTERECTOMY-ABDOMINAL-TOTAL (SHELLIE) N/A 4/16/2015    Performed by Alix Morales MD at [facility] OR    ILEOSCOPY N/A 8/8/2017    Performed by Tommie Klein MD at [facility] ENDO (2ND FLR)    ILEOSCOPY N/A 10/14/2016    Performed by Janelle Mcpherson MD at [facility] ENDO (2ND FLR)    ILEOSCOPY N/A 9/25/2013    Performed by Emilio Cameron MD at [facility] ENDO (4TH FLR)    ILEOSTOMY      INCISION AND DRAINAGE, ABSCESS N/A 8/29/2013    Performed by Bob Parsons MD at [facility] OR 2ND FLR    JODKPUMIE-JVEV-G-CATH Left 2/21/2017    Performed by Parish Sanchez Jr., MD at [facility] OR 2ND FLR    OOPHORECTOMY Right 04/16/2015    PORTACATH PLACEMENT  02/21/2017    REPAIR-BLADDER  4/16/2015    Performed by [facility]ix [facility]ales MD at [facility] OR    SALPINGO-OOPHERECTOMY Right 4/16/2015    Performed by Alix Morales MD at [facility] OR    SIGMOIDOSCOPY, FLEXIBLE N/A 2/7/2014    Performed by Erasto Sewell MD at [facility] ENDO (2ND FLR)    SKIN BIOPSY      SMALL INTESTINE SURGERY      TOTAL ABDOMINAL HYSTERECTOMY  04/16/2015    TOTAL COLECTOMY      TUBAL LIGATION  06/06/2012    UPPER GASTROINTESTINAL ENDOSCOPY         Review of patient's allergies indicates:   Allergen Reactions    Azathioprine sodium Other (See Comments)     Other reaction(s): pancreatitis  Other reaction(s): pancreatitis    Methotrexate analogues Other (See Comments)     leukopenia    Stelara [ustekinumab] Other (See Comments)     Multiple infections    Zofran [ondansetron hcl (pf)] Other (See Comments)     Per patient causes prolong QT    Vancomycin analogues Hives    Morphine Itching and Other (See Comments)     Other reaction(s): Itching    Bactrim [sulfamethoxazole-trimethoprim] Palpitations    Ciprofloxacin Palpitations       Medications:  Medications Prior to Admission   Medication Sig    ALPRAZolam (XANAX) 1 MG tablet Take 1 tablet (1 mg total) by mouth 2 (two) times daily. as needed for anxiety.     diphenoxylate-atropine 2.5-0.025 mg (LOMOTIL) 2.5-0.025 mg per tablet Take 1 tablet by mouth 4 (four) times daily as needed for Diarrhea.    dronabinol (MARINOL) 5 MG capsule Take 1 capsule (5 mg total) by mouth 2 (two) times daily before meals.    loperamide (IMODIUM) 2 mg capsule Take 2 mg by mouth daily as needed for Diarrhea.    magnesium 250 mg Tab Take by mouth once.    multivitamin (ONE DAILY MULTIVITAMIN) per tablet Take 1 tablet by mouth once daily.    potassium citrate 15 mEq TbSR Take 2 tablets by mouth 2 (two) times daily.    promethazine (PHENERGAN) 25 MG tablet Take 1 tablet (25 mg total) by mouth every 6 (six) hours as needed.    valACYclovir (VALTREX) 500 MG tablet TK 1 T PO QD    [] butalbital-acetaminophen-caffeine -40 mg (FIORICET, ESGIC) -40 mg per tablet Take 1 tablet by mouth every 4 (four) hours as needed for Headaches.    clindamycin (CLINDESSE) 2 % vaginal cream PLACE VAGINALLY EVERY EVENING    flu vac [id number] 36mos up,PF, 60 mcg (15 mcg x 4)/0.5 mL Syrg inject into the muscle    oxyCODONE-acetaminophen (PERCOCET)  mg per tablet Take 1 tablet by mouth every 6 (six) hours as needed for Pain.    terconazole (TERAZOL 7) 0.4 % Crea INSERT ONE APPLICATORFUL VAGINALLY AT BEDTIME    zolpidem (AMBIEN) 10 mg Tab TAKE 1 TABLET BY MOUTH EVERY EVENING     Antibiotics (From admission, onward)    Start     Stop Route Frequency Ordered    19 2100  amoxicillin-clavulanate 875-125mg per tablet 1 tablet       2059 Oral Every 12 hours 19 1227        Antifungals (From admission, onward)    None        Antivirals (From admission, onward)    None           Immunization History   Administered Date(s) Administered    Influenza 2013    Influenza - Quadrivalent - PF 10/04/2016, 2017, 2018    Influenza Split 2013    Pneumococcal Conjugate - 13 Valent 2017    Pneumococcal Polysaccharide - 23 Valent 2015    Td (ADULT)  06/28/2013       Family History     Problem Relation (Age of Onset)    Cancer Father, Maternal Grandfather    Colon cancer Father    Crohn's disease Brother    Endometrial cancer Maternal Aunt    Hypertension Mother    Skin cancer Maternal Grandfather        Social History     Socioeconomic History    Marital status: Single     Spouse name: None    Number of children: None    Years of education: None    Highest education level: None   Social Needs    Financial resource strain: None    Food insecurity - worry: None    Food insecurity - inability: None    Transportation needs - medical: None    Transportation needs - non-medical: None   Occupational History     Employer: [employer]   Tobacco Use    Smoking status: Never Smoker    Smokeless tobacco: Never Used   Substance and Sexual Activity    Alcohol use: Yes     Alcohol/week: 0.0 oz     Comment: Patient only drinks wine not regular basis.    Drug use: No    Sexual activity: Yes     Partners: Male     Birth control/protection: Pill, Surgical, Condom     Comment: HYST   Other Topics Concern    Are you pregnant or think you may be? Not Asked    Breast-feeding Not Asked   Social History Narrative    None     Review of Systems   Constitutional: Positive for unexpected weight change. Negative for activity change, appetite change (endorses good appetite but BM with any PO intake), chills, fatigue and fever.   HENT: Negative for sore throat and trouble swallowing.    Eyes: Negative for visual disturbance.   Respiratory: Negative for cough and shortness of breath.    Gastrointestinal: Positive for abdominal pain (intermittent stabbing lower abdominal pain at baseline. currently no pain.) and diarrhea (increased ostomy output (not changed recently)). Negative for abdominal distention, anal bleeding (no blood in ostomy), blood in stool, constipation, nausea and vomiting.   Genitourinary: Negative for dysuria.   Musculoskeletal: Negative for  arthralgias and myalgias.   Skin: Negative for rash.   Neurological: Negative for dizziness, light-headedness and headaches.   Psychiatric/Behavioral: Negative for agitation and confusion.     Objective:     Vital Signs (Most Recent):  Temp: (P) 99.3 °F (37.4 °C) (01/11/19 1605)  Pulse: (!) 118 (01/11/19 1624)  Resp: 18 (01/11/19 1124)  BP: 137/86 (01/11/19 1124)  SpO2: 99 % (01/11/19 1124) Vital Signs (24h Range):  Temp:  [98 °F (36.7 °C)-99.5 °F (37.5 °C)] (P) 99.3 °F (37.4 °C)  Pulse:  [] 118  Resp:  [11-18] 18  SpO2:  [94 %-99 %] 99 %  BP: (103-170)/() 137/86     Weight: 42.2 kg (93 lb)  Body mass index is 17.57 kg/m².    Estimated Creatinine Clearance: 74 mL/min (based on SCr of 0.7 mg/dL).    Physical Exam   Constitutional: She is oriented to person, place, and time. She appears well-developed and well-nourished. No distress.   HENT:   Head: Normocephalic and atraumatic.   Mouth/Throat: No oropharyngeal exudate.   Eyes: Conjunctivae are normal. No scleral icterus.   Cardiovascular: Regular rhythm and normal heart sounds.   Pulmonary/Chest: Breath sounds normal. No respiratory distress.   Abdominal: Soft. Bowel sounds are normal. She exhibits no distension and no mass. There is no tenderness. There is no rebound and no guarding.   Soft, non-distended abdomen. Ostomy to RLQ.   Musculoskeletal: She exhibits no edema or tenderness.   Lymphadenopathy:     She has no cervical adenopathy.   Neurological: She is alert and oriented to person, place, and time.   Skin: Skin is warm. Capillary refill takes less than 2 seconds. She is not diaphoretic.   Port noted, c/d/i.   Psychiatric: She has a normal mood and affect. Her behavior is normal. Judgment and thought content normal.   Nursing note and vitals reviewed.      Significant Labs:   CBC:   Recent Labs   Lab 01/10/19  0455 01/11/19  0549   WBC 4.95 5.13   HGB 11.9* 11.2*   HCT 37.1 35.6*    288     CMP:   Recent Labs   Lab 01/10/19  0455  01/11/19  0549    140   K 4.1 4.1    106   CO2 26 28   GLU 98 90   BUN 9 8   CREATININE 0.7 0.7   CALCIUM 9.5 9.7   PROT 7.1 7.0   ALBUMIN 3.4* 3.4*   BILITOT 0.4 0.4   ALKPHOS 97 92   AST 16 16   ALT 11 8*   ANIONGAP 9 6*   EGFRNONAA >60.0 >60.0       Significant Imaging: I have reviewed all pertinent imaging results/findings within the past 24 hours.

## 2019-01-12 LAB
ALBUMIN SERPL BCP-MCNC: 3.3 G/DL
ALP SERPL-CCNC: 85 U/L
ALT SERPL W/O P-5'-P-CCNC: 8 U/L
ANION GAP SERPL CALC-SCNC: 9 MMOL/L
AST SERPL-CCNC: 12 U/L
BACTERIA BLD CULT: NORMAL
BACTERIA BLD CULT: NORMAL
BASOPHILS # BLD AUTO: 0.02 K/UL
BASOPHILS NFR BLD: 0.5 %
BILIRUB SERPL-MCNC: 0.4 MG/DL
BUN SERPL-MCNC: 8 MG/DL
CALCIUM SERPL-MCNC: 9.1 MG/DL
CHLORIDE SERPL-SCNC: 106 MMOL/L
CO2 SERPL-SCNC: 25 MMOL/L
CREAT SERPL-MCNC: 0.7 MG/DL
DIFFERENTIAL METHOD: ABNORMAL
EOSINOPHIL # BLD AUTO: 0.1 K/UL
EOSINOPHIL NFR BLD: 2.1 %
ERYTHROCYTE [DISTWIDTH] IN BLOOD BY AUTOMATED COUNT: 15.5 %
EST. GFR  (AFRICAN AMERICAN): >60 ML/MIN/1.73 M^2
EST. GFR  (NON AFRICAN AMERICAN): >60 ML/MIN/1.73 M^2
GLUCOSE SERPL-MCNC: 87 MG/DL
HCT VFR BLD AUTO: 35.5 %
HGB BLD-MCNC: 11.2 G/DL
IMM GRANULOCYTES # BLD AUTO: 0.01 K/UL
IMM GRANULOCYTES NFR BLD AUTO: 0.2 %
LYMPHOCYTES # BLD AUTO: 2 K/UL
LYMPHOCYTES NFR BLD: 46 %
MAGNESIUM SERPL-MCNC: 1.9 MG/DL
MCH RBC QN AUTO: 27.5 PG
MCHC RBC AUTO-ENTMCNC: 31.5 G/DL
MCV RBC AUTO: 87 FL
MONOCYTES # BLD AUTO: 0.5 K/UL
MONOCYTES NFR BLD: 12.1 %
NEUTROPHILS # BLD AUTO: 1.7 K/UL
NEUTROPHILS NFR BLD: 39.1 %
NRBC BLD-RTO: 0 /100 WBC
PHOSPHATE SERPL-MCNC: 4 MG/DL
PLATELET # BLD AUTO: 287 K/UL
PMV BLD AUTO: 9.9 FL
POTASSIUM SERPL-SCNC: 3.9 MMOL/L
PROT SERPL-MCNC: 6.7 G/DL
RBC # BLD AUTO: 4.08 M/UL
SODIUM SERPL-SCNC: 140 MMOL/L
WBC # BLD AUTO: 4.3 K/UL

## 2019-01-12 PROCEDURE — 36415 COLL VENOUS BLD VENIPUNCTURE: CPT

## 2019-01-12 PROCEDURE — 99232 SBSQ HOSP IP/OBS MODERATE 35: CPT | Mod: ,,, | Performed by: HOSPITALIST

## 2019-01-12 PROCEDURE — 80053 COMPREHEN METABOLIC PANEL: CPT

## 2019-01-12 PROCEDURE — 85025 COMPLETE CBC W/AUTO DIFF WBC: CPT

## 2019-01-12 PROCEDURE — 83735 ASSAY OF MAGNESIUM: CPT

## 2019-01-12 PROCEDURE — 99232 PR SUBSEQUENT HOSPITAL CARE,LEVL II: ICD-10-PCS | Mod: ,,, | Performed by: INTERNAL MEDICINE

## 2019-01-12 PROCEDURE — 25000003 PHARM REV CODE 250: Performed by: HOSPITALIST

## 2019-01-12 PROCEDURE — 99232 PR SUBSEQUENT HOSPITAL CARE,LEVL II: ICD-10-PCS | Mod: ,,, | Performed by: HOSPITALIST

## 2019-01-12 PROCEDURE — 63600175 PHARM REV CODE 636 W HCPCS: Performed by: HOSPITALIST

## 2019-01-12 PROCEDURE — 84100 ASSAY OF PHOSPHORUS: CPT

## 2019-01-12 PROCEDURE — 99232 SBSQ HOSP IP/OBS MODERATE 35: CPT | Mod: ,,, | Performed by: INTERNAL MEDICINE

## 2019-01-12 PROCEDURE — 11000001 HC ACUTE MED/SURG PRIVATE ROOM

## 2019-01-12 PROCEDURE — 25000003 PHARM REV CODE 250: Performed by: INTERNAL MEDICINE

## 2019-01-12 RX ORDER — CLONAZEPAM 1 MG/1
1 TABLET ORAL 2 TIMES DAILY PRN
Status: DISCONTINUED | OUTPATIENT
Start: 2019-01-12 | End: 2019-01-16 | Stop reason: HOSPADM

## 2019-01-12 RX ADMIN — DIPHENOXYLATE HYDROCHLORIDE AND ATROPINE SULFATE 1 TABLET: 2.5; .025 TABLET ORAL at 05:01

## 2019-01-12 RX ADMIN — POTASSIUM CITRATE 30 MEQ: 10 TABLET ORAL at 09:01

## 2019-01-12 RX ADMIN — DRONABINOL 5 MG: 2.5 CAPSULE ORAL at 05:01

## 2019-01-12 RX ADMIN — PROMETHAZINE HYDROCHLORIDE 25 MG: 25 TABLET ORAL at 12:01

## 2019-01-12 RX ADMIN — AMOXICILLIN AND CLAVULANATE POTASSIUM 1 TABLET: 875; 125 TABLET, FILM COATED ORAL at 09:01

## 2019-01-12 RX ADMIN — OXYCODONE HYDROCHLORIDE AND ACETAMINOPHEN 1 TABLET: 10; 325 TABLET ORAL at 12:01

## 2019-01-12 RX ADMIN — SODIUM CHLORIDE, SODIUM LACTATE, POTASSIUM CHLORIDE, AND CALCIUM CHLORIDE: .6; .31; .03; .02 INJECTION, SOLUTION INTRAVENOUS at 07:01

## 2019-01-12 RX ADMIN — DIPHENOXYLATE HYDROCHLORIDE AND ATROPINE SULFATE 1 TABLET: 2.5; .025 TABLET ORAL at 09:01

## 2019-01-12 RX ADMIN — ZOLPIDEM TARTRATE 5 MG: 5 TABLET ORAL at 02:01

## 2019-01-12 RX ADMIN — LOPERAMIDE HYDROCHLORIDE 2 MG: 2 CAPSULE ORAL at 05:01

## 2019-01-12 RX ADMIN — LOPERAMIDE HYDROCHLORIDE 2 MG: 2 CAPSULE ORAL at 09:01

## 2019-01-12 RX ADMIN — SODIUM CHLORIDE, SODIUM LACTATE, POTASSIUM CHLORIDE, AND CALCIUM CHLORIDE: .6; .31; .03; .02 INJECTION, SOLUTION INTRAVENOUS at 08:01

## 2019-01-12 RX ADMIN — MAGNESIUM OXIDE TAB 400 MG (241.3 MG ELEMENTAL MG) 400 MG: 400 (241.3 MG) TAB at 09:01

## 2019-01-12 RX ADMIN — DIPHENOXYLATE HYDROCHLORIDE AND ATROPINE SULFATE 1 TABLET: 2.5; .025 TABLET ORAL at 12:01

## 2019-01-12 RX ADMIN — CLONAZEPAM 1 MG: 1 TABLET ORAL at 09:01

## 2019-01-12 RX ADMIN — MIRTAZAPINE 7.5 MG: 7.5 TABLET ORAL at 09:01

## 2019-01-12 RX ADMIN — OXYCODONE HYDROCHLORIDE AND ACETAMINOPHEN 1 TABLET: 10; 325 TABLET ORAL at 10:01

## 2019-01-12 RX ADMIN — PANTOPRAZOLE SODIUM 40 MG: 40 TABLET, DELAYED RELEASE ORAL at 05:01

## 2019-01-12 RX ADMIN — LOPERAMIDE HYDROCHLORIDE 2 MG: 2 CAPSULE ORAL at 12:01

## 2019-01-12 NOTE — CONSULTS
[facility]  Endocrinology  Consult Note    Consult Requested by: Se Kolb MD   Reason for admit: Sinus tachycardia    HISTORY OF PRESENT ILLNESS:  37 yo F w PMH of Crohn's disease (s/p total colectomy w/ end ileostomy (2012)) on Entyvio infusions, recurrent UTI (most recently treated with Augmentin, Bactrim and Keflex in December for UTI. Cultures have previously grown: 12/04 Klebsiella (R to ampicillin), 11/21 Klebsiella (R to ampicillin), 9/18 Enterococcus (R to tetracycline), 4/12 Enterobacter (pan sensitive), 2/02 ESBL (R to cephlosporins, FQs, ampicillin) and osteopenia recently admitted on  due to episodes of palpitations, facial flushing and hand tremors. This had been going on for about 1 year, no specific triggers related, no use of illicit drugs or steroids or caffeine. She reports symptoms with palpitaions, facial flushing, sensation changes in her hands and tremors, and her symptoms not controlled w xanax. During attacks, she was also found to have HTN w SBP 180s, sinus tachycardia as well. She had lost about 17 lbs in the past year wo diet change. Denies any signs of infection.   During admission, she was found to have elevated  urine metanephrines. Endo was consulted.  Previous metanephrines, catecholamines, 5HIAA, chromogranin A levels unremarkable. 24hr urine metanephrines pending. not taking metoprolol.  FT4,- normal       Medications and/or Treatments Impacting Glycemic Control:  Immunotherapy:    Immunosuppressants     None        Steroids:   Hormones (From admission, onward)    None        Pressors:    Autonomic Drugs (From admission, onward)    None          Medications Prior to Admission   Medication Sig Dispense Refill Last Dose    ALPRAZolam (XANAX) 1 MG tablet Take 1 tablet (1 mg total) by mouth 2 (two) times daily. as needed for anxiety. 60 tablet 0 1/7/2019    diphenoxylate-atropine 2.5-0.025 mg (LOMOTIL) 2.5-0.025 mg per tablet Take 1 tablet by mouth 4  (four) times daily as needed for Diarrhea. 100 tablet 0 2019    dronabinol (MARINOL) 5 MG capsule Take 1 capsule (5 mg total) by mouth 2 (two) times daily before meals. 60 capsule 1 2019    loperamide (IMODIUM) 2 mg capsule Take 2 mg by mouth daily as needed for Diarrhea.   2019    magnesium 250 mg Tab Take by mouth once.   2019    multivitamin (ONE DAILY MULTIVITAMIN) per tablet Take 1 tablet by mouth once daily.   2019    potassium citrate 15 mEq TbSR Take 2 tablets by mouth 2 (two) times daily. 360 tablet 3 2019    promethazine (PHENERGAN) 25 MG tablet Take 1 tablet (25 mg total) by mouth every 6 (six) hours as needed. 30 tablet 3 2019    valACYclovir (VALTREX) 500 MG tablet TK 1 T PO QD  2 2019    [] butalbital-acetaminophen-caffeine -40 mg (FIORICET, ESGIC) -40 mg per tablet Take 1 tablet by mouth every 4 (four) hours as needed for Headaches. 15 tablet 0 Unknown    clindamycin (CLINDESSE) 2 % vaginal cream PLACE VAGINALLY EVERY EVENING 40 g 0 Unknown    flu vac [id number] 36mos up,PF, 60 mcg (15 mcg x 4)/0.5 mL Syrg inject into the muscle 0.5 mL 0 Taking    oxyCODONE-acetaminophen (PERCOCET)  mg per tablet Take 1 tablet by mouth every 6 (six) hours as needed for Pain. 40 tablet 0 2018    terconazole (TERAZOL 7) 0.4 % Crea INSERT ONE APPLICATORFUL VAGINALLY AT BEDTIME 45 g 0 Taking    zolpidem (AMBIEN) 10 mg Tab TAKE 1 TABLET BY MOUTH EVERY EVENING 30 tablet 0        Current Facility-Administered Medications   Medication Dose Route Frequency Provider Last Rate Last Dose    acetaminophen tablet 650 mg  650 mg Oral Q8H PRN Charlette Santana MD   650 mg at 01/10/19 1815    amoxicillin-clavulanate 875-125mg per tablet 1 tablet  1 tablet Oral Q12H Inga Briones MD   1 tablet at 19 0950    clonazePAM tablet 1 mg  1 mg Oral BID PRN Se Kolb MD   1 mg at 19 0950    diphenoxylate-atropine 2.5-0.025 mg per  tablet 1 tablet  1 tablet Oral TID  Se Kolb MD   1 tablet at 01/12/19 0950    dronabinol capsule 5 mg  5 mg Oral BID AC Se Kolb MD   5 mg at 01/12/19 0521    lactated ringers infusion   Intravenous Continuous Se Kolb  mL/hr at 01/12/19 0820      loperamide capsule 2 mg  2 mg Oral QID Se Kolb MD   2 mg at 01/12/19 0950    magnesium oxide tablet 400 mg  400 mg Oral Daily Charlette Santana MD   400 mg at 01/12/19 0950    mirtazapine tablet 7.5 mg  7.5 mg Oral QHS Se Kolb MD   7.5 mg at 01/11/19 2104    oxyCODONE-acetaminophen  mg per tablet 1 tablet  1 tablet Oral Q6H PRN Charlette Santana MD   1 tablet at 01/11/19 2240    pantoprazole EC tablet 40 mg  40 mg Oral BID AC Se Kolb MD   40 mg at 01/12/19 0520    potassium citrate SR tablet 30 mEq  30 mEq Oral BID Charlette Santana MD   30 mEq at 01/12/19 0950    promethazine tablet 25 mg  25 mg Oral Q6H PRN Charlette Santana MD   25 mg at 01/11/19 1739    sodium chloride 0.9% flush 5 mL  5 mL Intravenous PRN Lubna Florez PA-C        sodium chloride 0.9% flush 5 mL  5 mL Intravenous PRN Charlette Santana MD        zolpidem tablet 5 mg  5 mg Oral Nightly PRN Charlette Santana MD   5 mg at 01/12/19 0204       Interval HPI:   Overnight events:  No acute issues overnight    PMH, PSH, FH, SH updated and reviewed       Review of Systems   Constitutional: Positive for diaphoresis, fatigue and unexpected weight change. Negative for activity change, appetite change, chills and fever.   HENT: Negative for congestion, drooling, facial swelling and trouble swallowing.    Eyes: Positive for visual disturbance. Negative for photophobia, discharge and itching.   Respiratory: Negative for apnea, cough, choking, chest tightness and shortness of breath.    Cardiovascular: Positive for palpitations. Negative for chest pain and leg swelling.    Gastrointestinal: Negative for abdominal distention, abdominal pain, anal bleeding, blood in stool, constipation, diarrhea and nausea.   Endocrine: Negative for cold intolerance, heat intolerance, polydipsia, polyphagia and polyuria.   Genitourinary: Negative for decreased urine volume, difficulty urinating and urgency.   Musculoskeletal: Negative for arthralgias, back pain, gait problem, joint swelling and neck stiffness.   Neurological: Positive for tremors and headaches. Negative for dizziness, seizures, syncope, facial asymmetry, weakness, light-headedness and numbness.   Psychiatric/Behavioral: Negative for agitation, confusion, hallucinations, self-injury, sleep disturbance and suicidal ideas. The patient is nervous/anxious. The patient is not hyperactive.        Labs Reviewed and Include:  BASELINE Creatinine:   @[email or web address]@  [unfilled]  @[facility]@  [facility]   Lab 01/12/19  0642   GLU 87   CALCIUM 9.1   ALBUMIN 3.3*   PROT 6.7      K 3.9   CO2 25      BUN 8   CREATININE 0.7   ALKPHOS 85   ALT 8*   AST 12   BILITOT 0.4     Lab Results   Component Value Date    HGBA1C 4.8 09/18/2018       Nutritional status:   Body mass index is 17.57 kg/m².  Lab Results   Component Value Date    ALBUMIN 3.3 (L) 01/12/2019    ALBUMIN 3.4 (L) 01/11/2019    ALBUMIN 3.4 (L) 01/10/2019     Lab Results   Component Value Date    PREALBUMIN 23 11/22/2017    PREALBUMIN 23 11/09/2017    PREALBUMIN 36 10/16/2016       Estimated Creatinine Clearance: 74 mL/min (based on SCr of 0.7 mg/dL).    POCT Glucose results:    Current Insulin Regimen:         PHYSICAL EXAMINATION:  Vitals:    01/12/19 0830   BP: 123/78   Pulse: 104   Resp: 14   Temp: 98.7 °F (37.1 °C)     Body mass index is 17.57 kg/m².    Physical Exam   Constitutional: She is oriented to person, place, and time. She appears well-developed and well-nourished. No distress.   HENT:   Head: Normocephalic and atraumatic.   Mouth/Throat: Oropharynx is clear and moist.    Eyes: Conjunctivae and EOM are normal. Pupils are equal, round, and reactive to light. No scleral icterus.   Neck: Normal range of motion. Neck supple.   Cardiovascular: Normal rate, regular rhythm and normal heart sounds.   Pulmonary/Chest: Effort normal and breath sounds normal. No respiratory distress.   Abdominal: Soft. Bowel sounds are normal.   Ostomy w soft stool output   Musculoskeletal: Normal range of motion. She exhibits no edema or deformity.   Neurological: She is alert and oriented to person, place, and time. She displays normal reflexes. No cranial nerve deficit. She exhibits normal muscle tone. Coordination normal.   Skin: Skin is warm and dry. Capillary refill takes less than 2 seconds. No rash noted. She is not diaphoretic. No erythema. No pallor.   Psychiatric: She has a normal mood and affect.     .     ASSESSMENT and PLAN:    * Sinus tachycardia    Currently undergoing workup for carcinoid/pheo,   Pending 24 hours metanephrine results  Consider neurology consult for psycho motor seizure  Could also be a result of adrenal medulla hyperplasia/increased adrenal medullar sensitivity  No evidence of pheo at this time       Palpitations    Currently undergoing workup for carcinoid/pheo,   Pending 24 hours metanephrine results  Consider neurology consult for psycho motor seizure  Could also be a result of adrenal medulla hyperplasia/increased adrenal medullar sensitivity  No evidence of pheo at this time             DISCHARGE NEEDS: will assess daily    Duane Gomez MD  Endocrinology  [facility]

## 2019-01-12 NOTE — HPI
39 yo F w PMH of Crohn's disease (s/p total colectomy w/ end ileostomy (2012)) on Entyvio infusions, recurrent UTI (most recently treated with Augmentin, Bactrim and Keflex in December for UTI. Cultures have previously grown: 12/04 Klebsiella (R to ampicillin), 11/21 Klebsiella (R to ampicillin), 9/18 Enterococcus (R to tetracycline), 4/12 Enterobacter (pan sensitive), 2/02 ESBL (R to cephlosporins, FQs, ampicillin) and osteopenia recently admitted on  due to episodes of palpitations, facial flushing and hand tremors. This had been going on for about 1 year, no specific triggers related, no use of illicit drugs or steroids or caffeine. She reports symptoms with palpitaions, facial flushing, sensation changes in her hands and tremors, and her symptoms not controlled w xanax. During attacks, she was also found to have HTN w SBP 180s, sinus tachycardia as well. She had lost about 17 lbs in the past year wo diet change. Denies any signs of infection.   During admission, she was found to have elevated  urine metanephrines. Endo was consulted.  Previous metanephrines, catecholamines, 5HIAA, chromogranin A levels unremarkable. 24hr urine metanephrines pending. not taking metoprolol.  FT4,- normal

## 2019-01-12 NOTE — SUBJECTIVE & OBJECTIVE
Interval HPI:   Overnight events:  No acute issues overnight    PMH, PSH, FH, SH updated and reviewed       Review of Systems   Constitutional: Positive for diaphoresis, fatigue and unexpected weight change. Negative for activity change, appetite change, chills and fever.   HENT: Negative for congestion, drooling, facial swelling and trouble swallowing.    Eyes: Positive for visual disturbance. Negative for photophobia, discharge and itching.   Respiratory: Negative for apnea, cough, choking, chest tightness and shortness of breath.    Cardiovascular: Positive for palpitations. Negative for chest pain and leg swelling.   Gastrointestinal: Negative for abdominal distention, abdominal pain, anal bleeding, blood in stool, constipation, diarrhea and nausea.   Endocrine: Negative for cold intolerance, heat intolerance, polydipsia, polyphagia and polyuria.   Genitourinary: Negative for decreased urine volume, difficulty urinating and urgency.   Musculoskeletal: Negative for arthralgias, back pain, gait problem, joint swelling and neck stiffness.   Neurological: Positive for tremors and headaches. Negative for dizziness, seizures, syncope, facial asymmetry, weakness, light-headedness and numbness.   Psychiatric/Behavioral: Negative for agitation, confusion, hallucinations, self-injury, sleep disturbance and suicidal ideas. The patient is nervous/anxious. The patient is not hyperactive.        Labs Reviewed and Include:  BASELINE Creatinine:   [unfilled]  [unfilled]  [unfilled]  Recent Labs   Lab 01/12/19  0642   GLU 87   CALCIUM 9.1   ALBUMIN 3.3*   PROT 6.7      K 3.9   CO2 25      BUN 8   CREATININE 0.7   ALKPHOS 85   ALT 8*   AST 12   BILITOT 0.4     Lab Results   Component Value Date    HGBA1C 4.8 09/18/2018       Nutritional status:   Body mass index is 17.57 kg/m².  Lab Results   Component Value Date    ALBUMIN 3.3 (L) 01/12/2019    ALBUMIN 3.4 (L) 01/11/2019    ALBUMIN 3.4 (L) 01/10/2019     Lab Results    Component Value Date    PREALBUMIN 23 11/22/2017    PREALBUMIN 23 11/09/2017    PREALBUMIN 36 10/16/2016       Estimated Creatinine Clearance: 74 mL/min (based on SCr of 0.7 mg/dL).    POCT Glucose results:    Current Insulin Regimen:         PHYSICAL EXAMINATION:  Vitals:    01/12/19 0830   BP: 123/78   Pulse: 104   Resp: 14   Temp: 98.7 °F (37.1 °C)     Body mass index is 17.57 kg/m².    Physical Exam   Constitutional: She is oriented to person, place, and time. She appears well-developed and well-nourished. No distress.   HENT:   Head: Normocephalic and atraumatic.   Mouth/Throat: Oropharynx is clear and moist.   Eyes: Conjunctivae and EOM are normal. Pupils are equal, round, and reactive to light. No scleral icterus.   Neck: Normal range of motion. Neck supple.   Cardiovascular: Normal rate, regular rhythm and normal heart sounds.   Pulmonary/Chest: Effort normal and breath sounds normal. No respiratory distress.   Abdominal: Soft. Bowel sounds are normal.   Ostomy w soft stool output   Musculoskeletal: Normal range of motion. She exhibits no edema or deformity.   Neurological: She is alert and oriented to person, place, and time. She displays normal reflexes. No cranial nerve deficit. She exhibits normal muscle tone. Coordination normal.   Skin: Skin is warm and dry. Capillary refill takes less than 2 seconds. No rash noted. She is not diaphoretic. No erythema. No pallor.   Psychiatric: She has a normal mood and affect.

## 2019-01-12 NOTE — PLAN OF CARE
Problem: Adult Inpatient Plan of Care  Goal: Plan of Care Review  Outcome: Ongoing (interventions implemented as appropriate)  Patient rested and ambulated to the rest room. Will continue to monitor.

## 2019-01-12 NOTE — PROGRESS NOTES
Progress Note  Hospital Medicine    Admit Date: 1/7/2019  Length of Stay:  LOS: 5 days     SUBJECTIVE:         Follow-up For:  Sinus tachycardia    HPI/Interval history (See H&P for complete P,F,SHx) :      urine cultures - klebsiella sensitive to ceftriaxone switched to augmentin (day 2/3) for a complicated UTI. s/p  psychiatry consulted for possible panic attacks/ depression - PRN Klonopin 0.5mg--> 1mg  BID prn symptomatic relief. started on remeron   . improving  ostomy output - started on loperamide QID and lomotil TID- stool for Clost diff assay negative. GI and ID Consulted       Review of Systems: List if applicable  Review of Systems   Constitutional: Positive for diaphoresis and unexpected weight change (7lb weight loss). Negative for activity change, appetite change, chills, fatigue and fever.   Eyes: Negative for photophobia and visual disturbance.   Respiratory: Negative for apnea, cough, choking, shortness of breath, wheezing and stridor.    Cardiovascular: Positive for palpitations.(resolved)  Negative for chest pain and leg swelling.   Gastrointestinal: Negative for abdominal pain, blood in stool, constipation, diarrhea, nausea and vomiting.   Endocrine: Negative for cold intolerance and heat intolerance.   Genitourinary: Positive for frequency. Negative for difficulty urinating.   Musculoskeletal: Negative for arthralgias and myalgias.   Skin: Negative for rash and wound.   Allergic/Immunologic: Negative for immunocompromised state.   Neurological: Negative for dizziness, weakness and headaches.   Psychiatric/Behavioral: Negative for agitation, behavioral problems and confusion.       OBJECTIVE:     Vital Signs Range (Last 24H):  Temp:  [98.5 °F (36.9 °C)-99.3 °F (37.4 °C)]   Pulse:  []   Resp:  [8-16]   BP: (103-127)/(67-89)   SpO2:  [96 %-100 %]     Physical Exam:  Physical Exam   Constitutional: She is oriented to person, place, and time. She appears well-developed and well-nourished. No  distress.   Thin, tremulous   HENT:   Head: Normocephalic and atraumatic.   Mouth/Throat: No oropharyngeal exudate.   Eyes: Conjunctivae and EOM are normal. Pupils are equal, round, and reactive to light. No scleral icterus.   Neck: Normal range of motion. Neck supple. No tracheal deviation present. No thyromegaly present.   Cardiovascular: Regular rhythm, normal heart sounds and intact distal pulses. Tachycardia present.   No murmur heard.  Pulmonary/Chest: Effort normal and breath sounds normal. No respiratory distress. She has no wheezes. She has no rales. She exhibits no tenderness.   Abdominal: Soft. Bowel sounds are normal. She exhibits no distension. There is no tenderness. There is no rebound and no guarding.   +ve Ileostomy   Musculoskeletal: Normal range of motion. She exhibits no edema or tenderness.   Lymphadenopathy:     She has no cervical adenopathy.   Neurological: She is alert and oriented to person, place, and time.   Skin: Skin is warm and dry. No rash noted. She is not diaphoretic. No erythema. No pallor.   Psychiatric: She has a normal mood and affect. Her behavior is normal. Judgment and thought content normal.        CN III, IV, VI   Pupils are equal, round, and reactive to light.  Extraocular motions are normal.                Medications:  Medication list was reviewed and changes noted under Assessment/Plan.      Current Facility-Administered Medications:     acetaminophen tablet 650 mg, 650 mg, Oral, Q8H PRN, Charlette Santana MD, 650 mg at 01/10/19 1815    amoxicillin-clavulanate 875-125mg per tablet 1 tablet, 1 tablet, Oral, Q12H, Inga Briones MD, 1 tablet at 01/12/19 0950    clonazePAM tablet 1 mg, 1 mg, Oral, BID PRN, Se Kolb MD, 1 mg at 01/12/19 0950    diphenoxylate-atropine 2.5-0.025 mg per tablet 1 tablet, 1 tablet, Oral, TID WM, Se Kolb MD, 1 tablet at 01/12/19 0950    dronabinol capsule 5 mg, 5 mg, Oral, BID AC, Se Kolb MD, 5 mg  at 01/12/19 0521    lactated ringers infusion, , Intravenous, Continuous, Se Kolb MD, Last Rate: 100 mL/hr at 01/12/19 0820    loperamide capsule 2 mg, 2 mg, Oral, QID, Se Kolb MD, 2 mg at 01/12/19 0950    magnesium oxide tablet 400 mg, 400 mg, Oral, Daily, Charlette Santana MD, 400 mg at 01/12/19 0950    mirtazapine tablet 7.5 mg, 7.5 mg, Oral, QHS, Se Kolb MD, 7.5 mg at 01/11/19 2104    oxyCODONE-acetaminophen  mg per tablet 1 tablet, 1 tablet, Oral, Q6H PRN, Charlette Santana MD, 1 tablet at 01/11/19 2240    pantoprazole EC tablet 40 mg, 40 mg, Oral, BID AC, Se Kolb MD, 40 mg at 01/12/19 0520    potassium citrate SR tablet 30 mEq, 30 mEq, Oral, BID, Charlette Santana MD, 30 mEq at 01/12/19 0950    promethazine tablet 25 mg, 25 mg, Oral, Q6H PRN, Charlette Santana MD, 25 mg at 01/11/19 1739    sodium chloride 0.9% flush 5 mL, 5 mL, Intravenous, PRN, Lubna Florez PA-C    sodium chloride 0.9% flush 5 mL, 5 mL, Intravenous, PRN, Charlette Santana MD    zolpidem tablet 5 mg, 5 mg, Oral, Nightly PRN, Charlette Santana MD, 5 mg at 01/12/19 0204    acetaminophen, clonazePAM, oxyCODONE-acetaminophen, promethazine, sodium chloride 0.9%, sodium chloride 0.9%, zolpidem    Laboratory/Diagnostic Data:  Reviewed and noted in plan where applicable- Please see chart for full lab data.    Recent Labs   Lab 01/10/19  0455 01/11/19  0549 01/12/19  0642   WBC 4.95 5.13 4.30   HGB 11.9* 11.2* 11.2*   HCT 37.1 35.6* 35.5*    288 287       Recent Labs   Lab 01/10/19  0455 01/11/19  0549 01/12/19  0642    140 140   K 4.1 4.1 3.9    106 106   CO2 26 28 25   BUN 9 8 8   CREATININE 0.7 0.7 0.7   GLU 98 90 87   CALCIUM 9.5 9.7 9.1   MG 1.9 1.9 1.9   PHOS  --  4.6* 4.0       Recent Labs   Lab 01/10/19  0455 01/11/19  0549 01/12/19  0642   ALKPHOS 97 92 85   ALT 11 8* 8*   AST 16 16 12   ALBUMIN 3.4* 3.4* 3.3*    PROT 7.1 7.0 6.7   BILITOT 0.4 0.4 0.4        Microbiology labs for the last week  Microbiology Results (last 7 days)     Procedure Component Value Units Date/Time    Stool culture [[id number]] Collected:  01/09/19 1730    Order Status:  Completed Specimen:  Stool Updated:  01/12/19 1102     Stool Culture Nothing significant to date    Blood Culture #1 **CANNOT BE ORDERED STAT** [[id number]] Collected:  01/07/19 1712    Order Status:  Completed Specimen:  Blood from Peripheral, Forearm, Left Updated:  01/11/19 1812     Blood Culture, Routine No Growth to date     Blood Culture, Routine No Growth to date     Blood Culture, Routine No Growth to date     Blood Culture, Routine No Growth to date     Blood Culture, Routine No Growth to date    Blood Culture #2 **CANNOT BE ORDERED STAT** [[id number]] Collected:  01/07/19 1717    Order Status:  Completed Specimen:  Blood from Peripheral, Wrist, Left Updated:  01/11/19 1812     Blood Culture, Routine No Growth to date     Blood Culture, Routine No Growth to date     Blood Culture, Routine No Growth to date     Blood Culture, Routine No Growth to date     Blood Culture, Routine No Growth to date    Urine culture [[id number]]  (Susceptibility) Collected:  01/07/19 1723    Order Status:  Completed Specimen:  Urine Updated:  01/10/19 1549     Urine Culture, Routine --     KLEBSIELLA PNEUMONIAE  >100,000 cfu/ml      Narrative:       add on test urine toxicology per dr nohemy marks order #[id number]   01/07/2019  22:58     Clostridium difficile EIA [[id number]] Collected:  01/09/19 1730    Order Status:  Completed Specimen:  Stool Updated:  01/10/19 0531     C. diff Antigen Negative     C difficile Toxins A+B, EIA Negative     Comment: Testing not recommended for children <24 months old.       E. coli 0157 antigen [[id number]] Collected:  01/09/19 1730    Order Status:  No result Specimen:  Stool Updated:  01/09/19 2340           Imaging Results          X-Ray Chest PA And Lateral  "(Final result)  Result time 01/07/19 17:34:50    Final result by Suzi Martinez MD (01/07/19 17:34:50)                 Impression:      No acute cardiopulmonary abnormality.      Electronically signed by: Suzi Martinez  Date:    01/07/2019  Time:    17:34             Narrative:    EXAMINATION:  XR CHEST PA AND LATERAL    CLINICAL HISTORY:  Fever, unspecified    TECHNIQUE:  PA and lateral views of the chest were performed.    COMPARISON:  Multiple priors, most recent 12/11/2018    FINDINGS:  Stable positioning of a left chest wall port catheter.The lungs are clear.  No focal consolidation, pleural effusion or pneumothorax.    Normal cardiomediastinal contour.    No acute or aggressive osseous abnormality. Scoliosis.                                Estimated body mass index is 17.57 kg/m² as calculated from the following:    Height as of this encounter: 5' 1" (1.549 m).    Weight as of this encounter: 42.2 kg (93 lb).    I & O (Last 24H):    Intake/Output Summary (Last 24 hours) at 1/12/2019 1221  Last data filed at 1/12/2019 0536  Gross per 24 hour   Intake 720 ml   Output 1430 ml   Net -710 ml       Estimated Creatinine Clearance: 74 mL/min (based on SCr of 0.7 mg/dL).    ASSESSMENT/PLAN:     Active Problems:      Active Hospital Problems    Diagnosis  POA    *Sinus tachycardia [R00.0]Multiple episodes of flushing, tachycardia, diaphoresis, anxiety over the course of the year. Being worked up by endocrinology for pheo vs carcinoid syndrome.Dr. Landry  recommended patient commence 24h urine metanephrine collection and follow up in clinic.   - Previous metanephrines, catecholamines, 5HIAA, chromogranin A levels unremarkable. 24hr urine metanephrines pending. not taking metoprolol as per endocrine advice   Catecholamine levels mildly above normal. endocrine consulted 1/12  FT4,- normal   Hemodynamically stable.         Generalized anxiety disorder [F41.1] r/ o Panic attack - on Xanax PRN -   Had an episode of flushing " with tachycardia  117 with SBP 150s this AM Telemetry with sinus tachycardia. c/o dysuria and left flank resolved now. urine cultures -gram negative rods. tearful and admits to depression psychiatry consulted for possible panic attacks/ depression . admits stressors at home. previosly on celexa but discontinued dut prolonged QT. no psyc follow up since  1year    s/p psych eval on 1/10/19 - xanax discontinued    s/p  psychiatry consulted for possible panic attacks/ depression - PRN Klonopin 0.5mg--> 1mg  BID prn symptomatic relief. started on remeron   . i      Weight loss - 17 lb in 1 year. Nutrition consulted.started on remeron . Marinol may actually be worsening anxiety, nausea, vasodilation at this point and hasn't been helpful for appetite stimulation. Marinol being  weaned off     Diarrhea/ increased ostomy output /orthostasis -  .    . started on RL hydration - 100ml /hr for orthostais - for increased ostomy output - started on loperamide QID and lomotil TID- stool for Clost diff assay negative. GI and ID Consulted       Yes    Hypomagnesemia [E83.42]replaced   Unknown    Anemia [D64.9]Hb 10.2 normocytis. stable. monitor   Unknown    Urinary tract infection with hematuria [N39.0, R31.9]/ pyelonerphritis -  urine cultures - klebsiella sensitive to ceftriaxone switched to augmentin (day 2/3) for a complicated UTI.Follows w/ Urology OP. ID consulted - no role for suppressive therapy at this time prior to evaluation of  tract for structural and functional abnormalities by urology given history of prior enterovesicular fistula -- this will only promote resistance at this time  - follow-up per urology given 3rd recurrence   Yes    Hypokalemia [E87.6]repalced   Yes    Appetite impaired [R63.0]on Dronabinol . as above   Yes    Crohn's disease of small intestine [K50.00]    Chronic, stable, s/p ileostomy. Output about the same.   Continue lomotil/loperamide prn.   Continue Oxy IR for pain relief, prn. on  nae      Yes     Chronic      Yes      Resolved Hospital Problems   No resolved problems to display.         Disposition- home    DVT prophylaxis addressed with:MARIA EUGENIA Kolb MD  Attending Staff Physician  [facility]  pager- [phone] Utpgvvsoxiy - [phone]

## 2019-01-12 NOTE — ASSESSMENT & PLAN NOTE
Currently undergoing workup for carcinoid/pheo,   Pending 24 hours metanephrine results  Consider neurology consult for psycho motor seizure  Could also be a result of adrenal medulla hyperplasia/increased adrenal medullar sensitivity  No evidence of pheo at this time

## 2019-01-12 NOTE — PLAN OF CARE
Problem: Fall Injury Risk  Goal: Absence of Fall and Fall-Related Injury  Outcome: Ongoing (interventions implemented as appropriate)  Patient remains free from falls/injury throughout shift.    Problem: Adult Inpatient Plan of Care  Goal: Plan of Care Review  Outcome: Ongoing (interventions implemented as appropriate)  Patient AAOx4. Vitals remain stable. Continuous cardiac monitoring in place per MD orders; NSR noted this shift. Pain managed with prn oral meds. Patient able to perform care of ileostomy bag independently. Will continue to monitor.    Problem: Infection  Goal: Infection Symptom Resolution  Outcome: Ongoing (interventions implemented as appropriate)  No s/s of worsening infection; remains afebrile.

## 2019-01-13 PROBLEM — R29.818 TRANSIENT NEUROLOGICAL SYMPTOMS: Status: ACTIVE | Noted: 2019-01-13

## 2019-01-13 LAB
ALBUMIN SERPL BCP-MCNC: 3 G/DL
ALP SERPL-CCNC: 78 U/L
ALT SERPL W/O P-5'-P-CCNC: 9 U/L
ANION GAP SERPL CALC-SCNC: 5 MMOL/L
AST SERPL-CCNC: 13 U/L
BASOPHILS # BLD AUTO: 0.03 K/UL
BASOPHILS NFR BLD: 0.6 %
BILIRUB SERPL-MCNC: 0.2 MG/DL
BUN SERPL-MCNC: 9 MG/DL
CALCIUM SERPL-MCNC: 9 MG/DL
CHLORIDE SERPL-SCNC: 107 MMOL/L
CO2 SERPL-SCNC: 26 MMOL/L
CREAT SERPL-MCNC: 0.6 MG/DL
DIFFERENTIAL METHOD: ABNORMAL
EOSINOPHIL # BLD AUTO: 0.1 K/UL
EOSINOPHIL NFR BLD: 2.2 %
ERYTHROCYTE [DISTWIDTH] IN BLOOD BY AUTOMATED COUNT: 15.2 %
EST. GFR  (AFRICAN AMERICAN): >60 ML/MIN/1.73 M^2
EST. GFR  (NON AFRICAN AMERICAN): >60 ML/MIN/1.73 M^2
GLUCOSE SERPL-MCNC: 87 MG/DL
HCT VFR BLD AUTO: 33.2 %
HGB BLD-MCNC: 10.6 G/DL
IMM GRANULOCYTES # BLD AUTO: 0.01 K/UL
IMM GRANULOCYTES NFR BLD AUTO: 0.2 %
LYMPHOCYTES # BLD AUTO: 2.3 K/UL
LYMPHOCYTES NFR BLD: 46.2 %
MAGNESIUM SERPL-MCNC: 1.8 MG/DL
MCH RBC QN AUTO: 28.4 PG
MCHC RBC AUTO-ENTMCNC: 31.9 G/DL
MCV RBC AUTO: 89 FL
MONOCYTES # BLD AUTO: 0.6 K/UL
MONOCYTES NFR BLD: 11.9 %
NEUTROPHILS # BLD AUTO: 1.9 K/UL
NEUTROPHILS NFR BLD: 38.9 %
NRBC BLD-RTO: 0 /100 WBC
PHOSPHATE SERPL-MCNC: 3.9 MG/DL
PLATELET # BLD AUTO: 263 K/UL
PMV BLD AUTO: 9.7 FL
POTASSIUM SERPL-SCNC: 3.9 MMOL/L
PROT SERPL-MCNC: 6.2 G/DL
RBC # BLD AUTO: 3.73 M/UL
SODIUM SERPL-SCNC: 138 MMOL/L
WBC # BLD AUTO: 4.96 K/UL

## 2019-01-13 PROCEDURE — 63600175 PHARM REV CODE 636 W HCPCS: Performed by: HOSPITALIST

## 2019-01-13 PROCEDURE — 25000003 PHARM REV CODE 250: Performed by: INTERNAL MEDICINE

## 2019-01-13 PROCEDURE — A9585 GADOBUTROL INJECTION: HCPCS | Performed by: HOSPITALIST

## 2019-01-13 PROCEDURE — 99232 PR SUBSEQUENT HOSPITAL CARE,LEVL II: ICD-10-PCS | Mod: ,,, | Performed by: INTERNAL MEDICINE

## 2019-01-13 PROCEDURE — 83735 ASSAY OF MAGNESIUM: CPT

## 2019-01-13 PROCEDURE — 99233 SBSQ HOSP IP/OBS HIGH 50: CPT | Mod: ,,, | Performed by: HOSPITALIST

## 2019-01-13 PROCEDURE — 25000003 PHARM REV CODE 250: Performed by: HOSPITALIST

## 2019-01-13 PROCEDURE — 99233 SBSQ HOSP IP/OBS HIGH 50: CPT | Mod: ,,, | Performed by: PSYCHIATRY & NEUROLOGY

## 2019-01-13 PROCEDURE — 11000001 HC ACUTE MED/SURG PRIVATE ROOM

## 2019-01-13 PROCEDURE — 99233 PR SUBSEQUENT HOSPITAL CARE,LEVL III: ICD-10-PCS | Mod: ,,, | Performed by: HOSPITALIST

## 2019-01-13 PROCEDURE — 85025 COMPLETE CBC W/AUTO DIFF WBC: CPT

## 2019-01-13 PROCEDURE — 80053 COMPREHEN METABOLIC PANEL: CPT

## 2019-01-13 PROCEDURE — 99232 SBSQ HOSP IP/OBS MODERATE 35: CPT | Mod: ,,, | Performed by: INTERNAL MEDICINE

## 2019-01-13 PROCEDURE — 36415 COLL VENOUS BLD VENIPUNCTURE: CPT

## 2019-01-13 PROCEDURE — 99233 PR SUBSEQUENT HOSPITAL CARE,LEVL III: ICD-10-PCS | Mod: ,,, | Performed by: PSYCHIATRY & NEUROLOGY

## 2019-01-13 PROCEDURE — 25500020 PHARM REV CODE 255: Performed by: HOSPITALIST

## 2019-01-13 PROCEDURE — 84100 ASSAY OF PHOSPHORUS: CPT

## 2019-01-13 RX ORDER — LORAZEPAM 2 MG/ML
2 INJECTION INTRAMUSCULAR ONCE
Status: COMPLETED | OUTPATIENT
Start: 2019-01-13 | End: 2019-01-13

## 2019-01-13 RX ORDER — GADOBUTROL 604.72 MG/ML
4 INJECTION INTRAVENOUS
Status: COMPLETED | OUTPATIENT
Start: 2019-01-13 | End: 2019-01-13

## 2019-01-13 RX ADMIN — ZOLPIDEM TARTRATE 5 MG: 5 TABLET ORAL at 11:01

## 2019-01-13 RX ADMIN — LORAZEPAM 2 MG: 2 INJECTION, SOLUTION INTRAMUSCULAR; INTRAVENOUS at 10:01

## 2019-01-13 RX ADMIN — DIPHENOXYLATE HYDROCHLORIDE AND ATROPINE SULFATE 1 TABLET: 2.5; .025 TABLET ORAL at 12:01

## 2019-01-13 RX ADMIN — CLONAZEPAM 1 MG: 1 TABLET ORAL at 11:01

## 2019-01-13 RX ADMIN — PROMETHAZINE HYDROCHLORIDE 25 MG: 25 TABLET ORAL at 06:01

## 2019-01-13 RX ADMIN — OXYCODONE HYDROCHLORIDE AND ACETAMINOPHEN 1 TABLET: 10; 325 TABLET ORAL at 06:01

## 2019-01-13 RX ADMIN — PANTOPRAZOLE SODIUM 40 MG: 40 TABLET, DELAYED RELEASE ORAL at 05:01

## 2019-01-13 RX ADMIN — AMOXICILLIN AND CLAVULANATE POTASSIUM 1 TABLET: 875; 125 TABLET, FILM COATED ORAL at 09:01

## 2019-01-13 RX ADMIN — OXYCODONE HYDROCHLORIDE AND ACETAMINOPHEN 1 TABLET: 10; 325 TABLET ORAL at 09:01

## 2019-01-13 RX ADMIN — CLONAZEPAM 1 MG: 1 TABLET ORAL at 09:01

## 2019-01-13 RX ADMIN — DRONABINOL 5 MG: 2.5 CAPSULE ORAL at 06:01

## 2019-01-13 RX ADMIN — LOPERAMIDE HYDROCHLORIDE 2 MG: 2 CAPSULE ORAL at 09:01

## 2019-01-13 RX ADMIN — DIPHENOXYLATE HYDROCHLORIDE AND ATROPINE SULFATE 1 TABLET: 2.5; .025 TABLET ORAL at 09:01

## 2019-01-13 RX ADMIN — POTASSIUM CITRATE 30 MEQ: 10 TABLET ORAL at 09:01

## 2019-01-13 RX ADMIN — PROMETHAZINE HYDROCHLORIDE 25 MG: 25 TABLET ORAL at 09:01

## 2019-01-13 RX ADMIN — MIRTAZAPINE 7.5 MG: 7.5 TABLET ORAL at 09:01

## 2019-01-13 RX ADMIN — SODIUM CHLORIDE, SODIUM LACTATE, POTASSIUM CHLORIDE, AND CALCIUM CHLORIDE: .6; .31; .03; .02 INJECTION, SOLUTION INTRAVENOUS at 06:01

## 2019-01-13 RX ADMIN — GADOBUTROL 4 ML: 604.72 INJECTION INTRAVENOUS at 11:01

## 2019-01-13 RX ADMIN — MAGNESIUM OXIDE TAB 400 MG (241.3 MG ELEMENTAL MG) 400 MG: 400 (241.3 MG) TAB at 09:01

## 2019-01-13 RX ADMIN — PANTOPRAZOLE SODIUM 40 MG: 40 TABLET, DELAYED RELEASE ORAL at 06:01

## 2019-01-13 RX ADMIN — DIPHENOXYLATE HYDROCHLORIDE AND ATROPINE SULFATE 1 TABLET: 2.5; .025 TABLET ORAL at 05:01

## 2019-01-13 RX ADMIN — LOPERAMIDE HYDROCHLORIDE 2 MG: 2 CAPSULE ORAL at 05:01

## 2019-01-13 RX ADMIN — CLONAZEPAM 1 MG: 1 TABLET ORAL at 12:01

## 2019-01-13 RX ADMIN — ZOLPIDEM TARTRATE 5 MG: 5 TABLET ORAL at 12:01

## 2019-01-13 NOTE — ASSESSMENT & PLAN NOTE
-- previously on stelara which has PRES listed as adverse reaction - this was d/c 1 year ago  -- now on Entyvio infusions

## 2019-01-13 NOTE — HPI
"Ms. Salgado is a 38 year old woman with Crohn's disease (s/p total colectomy w end ileostomy 2012) on Entyvio infusions who presented to the ED on 1/7/19 after an episode of palpitations, tachycardia, flushing and tremulousness lasting ~20 minutes.    She reports having similar episodes for the past year with no clear triggers. She reports associated full body tremors, loss of time, and has been told that she responds during the episodes but appears "out of it". Sometimes the day prior to the episode she feels flushed, other times just minutes before. The episodes can last from 2 minutes to over 20 minutes. During one episode she was driving to her boyfriend's house, but ended up at the wrong house. The longest she has gone without an episode is 2 months. Her last episode was 1/10/19. She has been worked up thoroughly by endocrinology and cardiology with the initial suspicion for carcinoid/pheocromocytoma, however there has been no biochemical evidence of an endocrinopathy. EKG has shown sinus tachycardia.     She also reported 17lb weight loss in 1 year and increasing anxiety since symptom onset, so she was started on marinol and psychiatry was consulted. Marinol was then tapered off due to concern for worsening anxiety and nausea without any benefit. She says that these symptoms seemed to start around the time she started Stelara (Ustekinumab) for Crohn's, but this was discontinued in January 2018 due to rash. She is now on Entyvio infusions since October 2018.    She denies a history of seizures.  "

## 2019-01-13 NOTE — TREATMENT PLAN
GI Treatment Plan    Ivy Salgado is a 38 y.o. female admitted to hospital 1/7/2019 (Hospital Day: 7) due to Sinus tachycardia.     Interval History  - notes she is feeling better with increased anti-diarrheal medications with decreased but still frequent need for emptying bag  - denies new complaints3    Objective  Temp:  [98.4 °F (36.9 °C)-98.7 °F (37.1 °C)] 98.4 °F (36.9 °C) (01/13 0802)  Pulse:  [71-96] 96 (01/13 1124)  BP: (114-121)/(59-76) 121/59 (01/13 0802)  Resp:  [14] 14 (01/13 0802)  SpO2:  [96 %-100 %] 100 % (01/13 1200)    General: Alert, Oriented x3, no distress  Abdomen: Normoactive bowel sounds. Non-distended. Normal tympany. Soft. Non-tender. No peritoneal signs.    Laboratory    Recent Labs   Lab 01/11/19  0549 01/12/19  0642 01/13/19  0622   HGB 11.2* 11.2* 10.6*       Lab Results   Component Value Date    WBC 4.96 01/13/2019    HGB 10.6 (L) 01/13/2019    HCT 33.2 (L) 01/13/2019    MCV 89 01/13/2019     01/13/2019       Lab Results   Component Value Date     01/13/2019    K 3.9 01/13/2019     01/13/2019    CO2 26 01/13/2019    BUN 9 01/13/2019    CREATININE 0.6 01/13/2019    CALCIUM 9.0 01/13/2019    ANIONGAP 5 (L) 01/13/2019    ESTGFRAFRICA >60.0 01/13/2019    EGFRNONAA >60.0 01/13/2019       Lab Results   Component Value Date    ALT 9 (L) 01/13/2019    AST 13 01/13/2019    ALKPHOS 78 01/13/2019    BILITOT 0.2 01/13/2019       Lab Results   Component Value Date    INR 1.1 02/02/2018    INR 1.0 08/04/2017    INR 1.0 10/20/2016       Plan  - continue anti-diarrheal medications. She can continue to increase as an outpatient  - Plan of care was discussed with primary team.  - We will continue to follow.    Thank you for involving us in the care of Ivy Salgado. Please call with any additional questions, concerns or changes in the patient's clinical status.    Paul Loya MD  Gastroenterology Fellow, PGY IV  Pager: [phone]

## 2019-01-13 NOTE — SUBJECTIVE & OBJECTIVE
Past Medical History:   Diagnosis Date    Abnormal Pap smear 2007    Abnormal Pap smear 5/26/2011    Anemia     Anxiety     Arthritis     C. difficile diarrhea     Crohn's disease     Depression 8/5/2017    Encounter for blood transfusion     Genital HSV     History of colposcopy with cervical biopsy 2007 and 7/2011 2007-LYAL I  and 7/2011- LYLA I    Hypertension     Kidney stone     Kidney stone     Recurrent UTI 4/3/2013    S/P ileostomy 7/9/2012    Sterilization 6/23/2012       Past Surgical History:   Procedure Laterality Date    ABDOMINAL SURGERY      APPENDECTOMY      BLADDER SURGERY      partial cystectomy due to fistula    CKC      COLON SURGERY      COLONOSCOPY      EGD (ESOPHAGOGASTRODUODENOSCOPY) N/A 9/6/2013    Performed by Emilio Cameron MD at [facility] ENDO (4TH FLR)    ESOPHAGOGASTRODUODENOSCOPY (EGD) N/A 10/14/2016    Performed by Janelle Mcpherson MD at [facility] ENDO (2ND FLR)    ESOPHAGOGASTRODUODENOSCOPY (EGD) N/A 10/23/2014    Performed by Nathanael Mitchell MD at [facility] ENDO (2ND FLR)    EXAM UNDER ANESTHESIA N/A 8/29/2013    Performed by Bob Parsons MD at [facility] OR 2ND FLR    EXAM UNDER ANESTHESIA N/A 1/18/2013    Performed by Bob Parsons MD at [facility] OR 2ND FLR    HYSTERECTOMY-ABDOMINAL-TOTAL (SHELLIE) N/A 4/16/2015    Performed by Alix Morales MD at [facility] OR    ILEOSCOPY N/A 8/8/2017    Performed by Tommie Klein MD at [facility] ENDO (2ND FLR)    ILEOSCOPY N/A 10/14/2016    Performed by Janelle Mcpherson MD at [facility] ENDO (2ND FLR)    ILEOSCOPY N/A 9/25/2013    Performed by Emilio Cameron MD at [facility] ENDO (4TH FLR)    ILEOSTOMY      INCISION AND DRAINAGE, ABSCESS N/A 8/29/2013    Performed by Bob Parsons MD at [facility] OR 2ND FLR    QVYAECIGI-XGYU-P-CATH Left 2/21/2017    Performed by Parish Sanchez Jr., MD at [facility] OR 2ND FLR    OOPHORECTOMY Right 04/16/2015    PORTACATH PLACEMENT  02/21/2017    REPAIR-BLADDER  4/16/2015    Performed by Alix Morales  MD at [facility] OR    SALPINGO-OOPHERECTOMY Right 4/16/2015    Performed by Alix Morales MD at [facility] OR    SIGMOIDOSCOPY, FLEXIBLE N/A 2/7/2014    [facility] by [facility]asto [facility]ell [facility] at [facility] (2ND FLR)    SKIN BIOPSY      SMALL INTESTINE SURGERY      TOTAL ABDOMINAL HYSTERECTOMY  04/16/2015    TOTAL COLECTOMY      TUBAL LIGATION  06/06/2012    UPPER GASTROINTESTINAL ENDOSCOPY         Review of patient's allergies indicates:   Allergen Reactions    Azathioprine sodium Other (See Comments)     Other reaction(s): pancreatitis  Other reaction(s): pancreatitis    Methotrexate analogues Other (See Comments)     leukopenia    Stelara [ustekinumab] Other (See Comments)     Multiple infections    Zofran [ondansetron hcl (pf)] Other (See Comments)     Per patient causes prolong QT    Vancomycin analogues Hives    Morphine Itching and Other (See Comments)     Other reaction(s): Itching    Bactrim [sulfamethoxazole-trimethoprim] Palpitations    Ciprofloxacin Palpitations       Current Neurological Medications: n/a    No current facility-administered medications on file prior to encounter.      Current Outpatient Medications on File Prior to Encounter   Medication Sig    ALPRAZolam (XANAX) 1 MG tablet Take 1 tablet (1 mg total) by mouth 2 (two) times daily. as needed for anxiety.    diphenoxylate-atropine 2.5-0.025 mg (LOMOTIL) 2.5-0.025 mg per tablet Take 1 tablet by mouth 4 (four) times daily as needed for Diarrhea.    dronabinol (MARINOL) 5 MG capsule Take 1 capsule (5 mg total) by mouth 2 (two) times daily before meals.    loperamide (IMODIUM) 2 mg capsule Take 2 mg by mouth daily as needed for Diarrhea.    magnesium 250 mg Tab Take by mouth once.    multivitamin (ONE DAILY MULTIVITAMIN) per tablet Take 1 tablet by mouth once daily.    potassium citrate 15 mEq TbSR Take 2 tablets by mouth 2 (two) times daily.    promethazine (PHENERGAN) 25 MG tablet Take 1 tablet (25 mg total) by mouth every 6  (six) hours as needed.    valACYclovir (VALTREX) 500 MG tablet TK 1 T PO QD    clindamycin (CLINDESSE) 2 % vaginal cream PLACE VAGINALLY EVERY EVENING    flu vac [id number] 36mos up,PF, 60 mcg (15 mcg x 4)/0.5 mL Syrg inject into the muscle    oxyCODONE-acetaminophen (PERCOCET)  mg per tablet Take 1 tablet by mouth every 6 (six) hours as needed for Pain.    terconazole (TERAZOL 7) 0.4 % Crea INSERT ONE APPLICATORFUL VAGINALLY AT BEDTIME    zolpidem (AMBIEN) 10 mg Tab TAKE 1 TABLET BY MOUTH EVERY EVENING     Family History     Problem Relation (Age of Onset)    Cancer Father, Maternal Grandfather    Colon cancer Father    Crohn's disease Brother    Endometrial cancer Maternal Aunt    Hypertension Mother    Skin cancer Maternal Grandfather        Tobacco Use    Smoking status: Never Smoker    Smokeless tobacco: Never Used   Substance and Sexual Activity    Alcohol use: Yes     Alcohol/week: 0.0 oz     Comment: Patient only drinks wine not regular basis.    Drug use: No    Sexual activity: Yes     Partners: Male     Birth control/protection: Pill, Surgical, Condom     Comment: HYST     Review of Systems   Constitutional: Negative for activity change, chills and fever.        Flushing   HENT: Negative for sinus pressure and trouble swallowing.    Eyes: Negative for pain and visual disturbance.   Respiratory: Negative for cough, chest tightness and shortness of breath.    Cardiovascular: Positive for palpitations. Negative for chest pain.   Gastrointestinal: Negative for abdominal pain, diarrhea, nausea and vomiting.   Genitourinary: Negative for difficulty urinating and dysuria.   Musculoskeletal: Negative for back pain and neck pain.   Skin: Negative for rash and wound.   Neurological: Negative for weakness, numbness and headaches.   Psychiatric/Behavioral: Negative for agitation and confusion.     Objective:     Vital Signs (Most Recent):  Temp: 98.4 °F (36.9 °C) (01/13/19 0802)  Pulse: 82 (01/13/19  0802)  Resp: 14 (01/13/19 0802)  BP: (!) 121/59 (01/13/19 0802)  SpO2: 100 % (01/13/19 0802) Vital Signs (24h Range):  Temp:  [98.4 °F (36.9 °C)-98.7 °F (37.1 °C)] 98.4 °F (36.9 °C)  Pulse:  [71-99] 82  Resp:  [14] 14  SpO2:  [96 %-100 %] 100 %  BP: (114-122)/(59-85) 121/59     Weight: 42.2 kg (93 lb)  Body mass index is 17.57 kg/m².    Physical Exam   Constitutional: She appears well-developed and well-nourished.   HENT:   Head: Normocephalic and atraumatic.   Eyes: EOM are normal.   Pulmonary/Chest: Effort normal. No respiratory distress.   Neurological:   Reflex Scores:       Tricep reflexes are 3+ on the right side and 3+ on the left side.       Bicep reflexes are 3+ on the right side and 3+ on the left side.       Brachioradialis reflexes are 3+ on the right side and 3+ on the left side.       Patellar reflexes are 3+ on the right side and 3+ on the left side.  Skin: Skin is warm and dry.   Psychiatric: She has a normal mood and affect. Her speech is normal and behavior is normal.   Nursing note and vitals reviewed.      NEUROLOGICAL EXAMINATION:     MENTAL STATUS   Attention: normal.   Speech: speech is normal   Level of consciousness: alert    CRANIAL NERVES     CN III, IV, VI   Extraocular motions are normal.     CN VII   Facial expression full, symmetric.     MOTOR EXAM   Muscle bulk: normal  Overall muscle tone: normal       Moving all four extremities     REFLEXES     Reflexes   Right brachioradialis: 3+  Left brachioradialis: 3+  Right biceps: 3+  Left biceps: 3+  Right triceps: 3+  Left triceps: 3+  Right patellar: 3+  Left patellar: 3+  Right plantar: normal  Left plantar: normal       +Knee clonus, +cross adduction        Significant Labs:   CBC:   Recent Labs   Lab 01/12/19 0642 01/13/19 0622   WBC 4.30 4.96   HGB 11.2* 10.6*   HCT 35.5* 33.2*    263     CMP:   Recent Labs   Lab 01/12/19 0642 01/13/19 0622   GLU 87 87    138   K 3.9 3.9    107   CO2 25 26   BUN 8 9   CREATININE  0.7 0.6   CALCIUM 9.1 9.0   MG 1.9 1.8   PROT 6.7 6.2   ALBUMIN 3.3* 3.0*   BILITOT 0.4 0.2   ALKPHOS 85 78   AST 12 13   ALT 8* 9*   ANIONGAP 9 5*   EGFRNONAA >60.0 >60.0       Significant Imaging: I have reviewed all pertinent imaging results/findings within the past 24 hours.

## 2019-01-13 NOTE — ASSESSMENT & PLAN NOTE
-- reports significant worsening of anxiety since symptom onset, with minimal benefit from treatment so far

## 2019-01-13 NOTE — PLAN OF CARE
Discussed extensively with Dr. Kolb about case overnight.    Sinus tachycardia  24h urine metanephrines resulted and minimally elevated.  Called send out lab, and there was an error with documentation.  After discussion with Dr. Sanders, no biochemical support of pheo given Met:Cr minimally elevated.  Consider neurology consult for psycho motor seizure and/or evaluation with tilt/table test.  Could also be a result of adrenal medulla hyperplasia/increased adrenal medullar sensitivity  She has had an extensive workup both inpatient and outpatient.    At this point no biochemical evidence of a supporting endocrinopathy.  Will need continued outpatient endocrine follow up for other endocrine disorders - Vit D deficiency, osteopenia, and thyroid nodules.      Thank you for the consult.  We will sign off.    Please call endocrinology with any future questions.      Cara Landry MD  Endocrinology Fellow

## 2019-01-13 NOTE — ASSESSMENT & PLAN NOTE
Episodes are concerning for complex partial seizures. Workup for endocrinologic and cardiac etiologies has been unrevealing. Exam was notable for brisk reflexes throughout, which suggests a CNS process.     CT head 8/30/18 unremarkable  Urine metanephrines mildly elevated (352 - range 0-300); endocrinology feel labs not supportive of pheo     Recommendations:  -- MRI brain with and without contrast ASAP - will need ativan 2mg x1 for claustrophobia   -- Long term vEEG with goal to capture episode  -- Seizure precautions  -- Telemetry

## 2019-01-13 NOTE — PLAN OF CARE
Problem: Adult Inpatient Plan of Care  Goal: Plan of Care Review  Outcome: Ongoing (interventions implemented as appropriate)  Patient remained free of falls and injuries during shift.  Patient took medications without incident.  No other concerns noted.

## 2019-01-13 NOTE — PLAN OF CARE
Problem: Adult Inpatient Plan of Care  Goal: Plan of Care Review  Outcome: Ongoing (interventions implemented as appropriate)  Pt is free from falls trauma and injuries. Bed in low position with call light in reach. Skin is warm and dry. VS are stable. No noted fever. Medicated x1 for pain as ordered. Patient independent with adls.

## 2019-01-13 NOTE — PROGRESS NOTES
Progress Note  Hospital Medicine    Admit Date: 1/7/2019  Length of Stay:  LOS: 6 days     SUBJECTIVE:         Follow-up For:  Sinus tachycardia    HPI/Interval history (See H&P for complete P,F,SHx) :      urine cultures - klebsiella sensitive to ceftriaxone switched to augmentin (day 2/3) for a complicated UTI. s/p  psychiatry consulted for possible panic attacks/ depression - PRN Klonopin 0.5mg--> 1mg  BID prn symptomatic relief. started on remeron   . improving  ostomy output - started on loperamide QID and lomotil TID- stool for Clost diff assay negative. No evidence of pheochromocytoma per Endo. Neurology consulted for possible seizures - MRI brain - No acute intracranial abnormality to explain the patient's provided clinical history of seizure. 24hr EEG ordered     Review of Systems: List if applicable  Review of Systems   Constitutional: Positive for diaphoresis and unexpected weight change (7lb weight loss). Negative for activity change, appetite change, chills, fatigue and fever.   Eyes: Negative for photophobia and visual disturbance.   Respiratory: Negative for apnea, cough, choking, shortness of breath, wheezing and stridor.    Cardiovascular: Positive for palpitations.(resolved)  Negative for chest pain and leg swelling.   Gastrointestinal: Negative for abdominal pain, blood in stool, constipation, diarrhea, nausea and vomiting.   Endocrine: Negative for cold intolerance and heat intolerance.   Genitourinary: Positive for frequency. Negative for difficulty urinating.   Musculoskeletal: Negative for arthralgias and myalgias.   Skin: Negative for rash and wound.   Allergic/Immunologic: Negative for immunocompromised state.   Neurological: Negative for dizziness, weakness and headaches.   Psychiatric/Behavioral: Negative for agitation, behavioral problems and confusion.       OBJECTIVE:     Vital Signs Range (Last 24H):  Temp:  [98.4 °F (36.9 °C)-98.7 °F (37.1 °C)]   Pulse:  [71-96]   Resp:  [14]   BP:  (114-121)/(59-76)   SpO2:  [96 %-100 %]     Physical Exam:  Physical Exam   Constitutional: She is oriented to person, place, and time. She appears well-developed and well-nourished. No distress.   Thin, tremulous   HENT:   Head: Normocephalic and atraumatic.   Mouth/Throat: No oropharyngeal exudate.   Eyes: Conjunctivae and EOM are normal. Pupils are equal, round, and reactive to light. No scleral icterus.   Neck: Normal range of motion. Neck supple. No tracheal deviation present. No thyromegaly present.   Cardiovascular: Regular rhythm, normal heart sounds and intact distal pulses. Tachycardia present.   No murmur heard.  Pulmonary/Chest: Effort normal and breath sounds normal. No respiratory distress. She has no wheezes. She has no rales. She exhibits no tenderness.   Abdominal: Soft. Bowel sounds are normal. She exhibits no distension. There is no tenderness. There is no rebound and no guarding.   +ve Ileostomy   Musculoskeletal: Normal range of motion. She exhibits no edema or tenderness.   Lymphadenopathy:     She has no cervical adenopathy.   Neurological: She is alert and oriented to person, place, and time.   Skin: Skin is warm and dry. No rash noted. She is not diaphoretic. No erythema. No pallor.   Psychiatric: She has a normal mood and affect. Her behavior is normal. Judgment and thought content normal.        CN III, IV, VI   Pupils are equal, round, and reactive to light.  Extraocular motions are normal.                Medications:  Medication list was reviewed and changes noted under Assessment/Plan.      Current Facility-Administered Medications:     acetaminophen tablet 650 mg, 650 mg, Oral, Q8H PRN, Charlette Santana MD, 650 mg at 01/10/19 1815    amoxicillin-clavulanate 875-125mg per tablet 1 tablet, 1 tablet, Oral, Q12H, Inga Briones MD, 1 tablet at 01/13/19 0918    clonazePAM tablet 1 mg, 1 mg, Oral, BID PRN, Se Kolb MD, 1 mg at 01/13/19 0947     diphenoxylate-atropine 2.5-0.025 mg per tablet 1 tablet, 1 tablet, Oral, TID WM, Se Kolb MD, 1 tablet at 01/13/19 1211    lactated ringers infusion, , Intravenous, Continuous, Se Kolb MD, Last Rate: 100 mL/hr at 01/13/19 0629    loperamide capsule 2 mg, 2 mg, Oral, QID, Se Kolb MD, 2 mg at 01/13/19 0918    magnesium oxide tablet 400 mg, 400 mg, Oral, Daily, Charlette Santana MD, 400 mg at 01/13/19 0918    mirtazapine tablet 7.5 mg, 7.5 mg, Oral, QHS, Se Kolb MD, 7.5 mg at 01/12/19 2124    oxyCODONE-acetaminophen  mg per tablet 1 tablet, 1 tablet, Oral, Q6H PRN, Charlette Santana MD, 1 tablet at 01/13/19 0947    pantoprazole EC tablet 40 mg, 40 mg, Oral, BID AC, Se Kolb MD, 40 mg at 01/13/19 0610    potassium citrate SR tablet 30 mEq, 30 mEq, Oral, BID, Charlette Santana MD, 30 mEq at 01/13/19 0918    promethazine tablet 25 mg, 25 mg, Oral, Q6H PRN, Charlette Santana MD, 25 mg at 01/13/19 0947    sodium chloride 0.9% flush 5 mL, 5 mL, Intravenous, PRN, Lubna Florez PA-C    sodium chloride 0.9% flush 5 mL, 5 mL, Intravenous, PRN, Charlette Santana MD    zolpidem tablet 5 mg, 5 mg, Oral, Nightly PRN, Charlette Santana MD, 5 mg at 01/13/19 0027    acetaminophen, clonazePAM, oxyCODONE-acetaminophen, promethazine, sodium chloride 0.9%, sodium chloride 0.9%, zolpidem    Laboratory/Diagnostic Data:  Reviewed and noted in plan where applicable- Please see chart for full lab data.    Recent Labs   Lab 01/11/19  0549 01/12/19  0642 01/13/19  0622   WBC 5.13 4.30 4.96   HGB 11.2* 11.2* 10.6*   HCT 35.6* 35.5* 33.2*    287 263       Recent Labs   Lab 01/11/19  0549 01/12/19  0642 01/13/19  0622    140 138   K 4.1 3.9 3.9    106 107   CO2 28 25 26   BUN 8 8 9   CREATININE 0.7 0.7 0.6   GLU 90 87 87   CALCIUM 9.7 9.1 9.0   MG 1.9 1.9 1.8   PHOS 4.6* 4.0 3.9       Recent Labs   Lab  01/11/19  0549 01/12/19  0642 01/13/19  0622   ALKPHOS 92 85 78   ALT 8* 8* 9*   AST 16 12 13   ALBUMIN 3.4* 3.3* 3.0*   PROT 7.0 6.7 6.2   BILITOT 0.4 0.4 0.2        Microbiology labs for the last week  Microbiology Results (last 7 days)     Procedure Component Value Units Date/Time    Blood Culture #2 **CANNOT BE ORDERED STAT** [[id number]] Collected:  01/07/19 1717    Order Status:  Completed Specimen:  Blood from Peripheral, Wrist, Left Updated:  01/12/19 1812     Blood Culture, Routine No growth after 5 days.    Blood Culture #1 **CANNOT BE ORDERED STAT** [[id number]] Collected:  01/07/19 1712    Order Status:  Completed Specimen:  Blood from Peripheral, Forearm, Left Updated:  01/12/19 1812     Blood Culture, Routine No growth after 5 days.    Stool culture [[id number]] Collected:  01/09/19 1730    Order Status:  Completed Specimen:  Stool Updated:  01/12/19 1102     Stool Culture Nothing significant to date    Urine culture [[id number]]  (Susceptibility) Collected:  01/07/19 1723    Order Status:  Completed Specimen:  Urine Updated:  01/10/19 1549     Urine Culture, Routine --     KLEBSIELLA PNEUMONIAE  >100,000 cfu/ml      Narrative:       add on test urine toxicology per dr nohemy marks order #[id number]   01/07/2019  22:58     Clostridium difficile EIA [[id number]] Collected:  01/09/19 1730    Order Status:  Completed Specimen:  Stool Updated:  01/10/19 0531     C. diff Antigen Negative     C difficile Toxins A+B, EIA Negative     Comment: Testing not recommended for children <24 months old.       E. coli 0157 antigen [[id number]] Collected:  01/09/19 1730    Order Status:  No result Specimen:  Stool Updated:  01/09/19 2340           Imaging Results          X-Ray Chest PA And Lateral (Final result)  Result time 01/07/19 17:34:50    Final result by Suzi Martinez MD (01/07/19 17:34:50)                 Impression:      No acute cardiopulmonary abnormality.      Electronically signed by: Suzi  "Juan  Date:    01/07/2019  Time:    17:34             Narrative:    EXAMINATION:  XR CHEST PA AND LATERAL    CLINICAL HISTORY:  Fever, unspecified    TECHNIQUE:  PA and lateral views of the chest were performed.    COMPARISON:  Multiple priors, most recent 12/11/2018    FINDINGS:  Stable positioning of a left chest wall port catheter.The lungs are clear.  No focal consolidation, pleural effusion or pneumothorax.    Normal cardiomediastinal contour.    No acute or aggressive osseous abnormality. Scoliosis.                                Estimated body mass index is 17.57 kg/m² as calculated from the following:    Height as of this encounter: 5' 1" (1.549 m).    Weight as of this encounter: 42.2 kg (93 lb).    I & O (Last 24H):    Intake/Output Summary (Last 24 hours) at 1/13/2019 1329  Last data filed at 1/13/2019 1300  Gross per 24 hour   Intake 1080 ml   Output 1200 ml   Net -120 ml       Estimated Creatinine Clearance: 86.4 mL/min (based on SCr of 0.6 mg/dL).    ASSESSMENT/PLAN:     Active Problems:      Active Hospital Problems    Diagnosis  POA    *Sinus tachycardia [R00.0]Multiple episodes of flushing, tachycardia, diaphoresis, anxiety over the course of the year. Being worked up by endocrinology for pheo vs carcinoid syndrome.Dr. Landyr  recommended patient commence 24h urine metanephrine collection and follow up in clinic.   - Previous metanephrines, catecholamines, 5HIAA, chromogranin A levels unremarkable. 24hr urine metanephrines slightly elevated. not taking metoprolol as per endocrine advice   Catecholamine levels mildly above normal. endocrine consulted 1/12  FT4,- normal   Hemodynamically stable.   No evidence of pheochromocytoma per Endo. Neurology consulted for possible seizures - MRI brain - No acute intracranial abnormality to explain the patient's provided clinical history of seizure. 24hr EEG ordered         Generalized anxiety disorder [F41.1] r/ o Panic attack - on Xanax PRN -   Had an " episode of flushing with tachycardia  117 with SBP 150s this AM Telemetry with sinus tachycardia. c/o dysuria and left flank resolved now. urine cultures -gram negative rods. tearful and admits to depression psychiatry consulted for possible panic attacks/ depression . admits stressors at home. previosly on celexa but discontinued dut prolonged QT. no psyc follow up since  1year    s/p psych eval on 1/10/19 - xanax discontinued    s/p  psychiatry consulted for possible panic attacks/ depression - PRN Klonopin 0.5mg--> 1mg  BID prn symptomatic relief. started on remeron   . i      Weight loss - 17 lb in 1 year. Nutrition consulted.started on remeron . Marinol may actually be worsening anxiety, nausea, vasodilation at this point and hasn't been helpful for appetite stimulation. Marinol discontinued      Diarrhea/ increased ostomy output /orthostasis -  .    . started on RL hydration - 100ml /hr for orthostais - for increased ostomy output - started on loperamide QID and lomotil TID- stool for Clost diff assay negative. GI and ID Consulted       Yes    Hypomagnesemia [E83.42]replaced   Unknown    Anemia [D64.9]Hb 10.2 normocytis. stable. monitor   Unknown    Urinary tract infection with hematuria [N39.0, R31.9]/ pyelonerphritis -  urine cultures - klebsiella sensitive to ceftriaxone switched to augmentin (day 2/3) for a complicated UTI.Follows w/ Urology OP. 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 on Mirtazapine   Yes    Crohn's disease of small intestine [K50.00]    Chronic, stable, s/p ileostomy. Output about the same.   Continue lomotil/loperamide prn.   Continue Oxy IR for pain  relief, prn. on entyvio      Yes     Chronic      Yes      Resolved Hospital Problems   No resolved problems to display.         Disposition- home    DVT prophylaxis addressed with:MARIA EUGENIA Kolb MD  Attending Staff Physician  [facility] Medicine  pager- [phone] [employer] - [phone]

## 2019-01-13 NOTE — CONSULTS
"[facility]  Neurology  Consult Note    Patient Name: Ivy Salgado  MRN: [id number]  Admission Date: 1/7/2019  Hospital Length of Stay: 6 days  Code Status: Full Code   Attending Provider: Se Kolb MD   Consulting Provider: Siena Williamson MD  Primary Care Physician: Kalia Astorga MD  Principal Problem:Sinus tachycardia    Inpatient consult to Neurology  Consult performed by: Siena Williamson MD  Consult ordered by: Se Kolb MD         Subjective:     Chief Complaint:  Episodes of tremors, palpitations, tachycardia, HTN, flushing    HPI:   Ms. Salgado is a 38 year old woman with Crohn's disease (s/p total colectomy w end ileostomy 2012) on Entyvio infusions who presented to the ED on 1/7/19 after an episode of palpitations, tachycardia, flushing and tremulousness lasting ~20 minutes.    She reports having similar episodes for the past year with no clear triggers. She reports associated full body tremors, loss of time, and has been told that she responds during the episodes but appears "out of it". Sometimes the day prior to the episode she feels flushed, other times just minutes before. The episodes can last from 2 minutes to over 20 minutes. During one episode she was driving to her boyfriend's house, but ended up at the wrong house. The longest she has gone without an episode is 2 months. Her last episode was 1/10/19. She has been worked up thoroughly by endocrinology and cardiology with the initial suspicion for carcinoid/pheocromocytoma, however there has been no biochemical evidence of an endocrinopathy. EKG has shown sinus tachycardia.     She also reported 17lb weight loss in 1 year and increasing anxiety since symptom onset, so she was started on marinol and psychiatry was consulted. Marinol was then tapered off due to concern for worsening anxiety and nausea without any benefit. She says that these symptoms seemed to start around the time she started Stelara " (Ustekinumab) for Crohn's, but this was discontinued in January 2018 due to rash. She is now on Entyvio infusions since October 2018.    She denies a history of seizures.     Past Medical History:   Diagnosis Date    Abnormal Pap smear 2007    Abnormal Pap smear 5/26/2011    Anemia     Anxiety     Arthritis     C. difficile diarrhea     Crohn's disease     Depression 8/5/2017    Encounter for blood transfusion     Genital HSV     History of colposcopy with cervical biopsy 2007 and 7/2011 2007-LYLA I  and 7/2011- LYLA I    Hypertension     Kidney stone     Kidney stone     Recurrent UTI 4/3/2013    S/P ileostomy 7/9/2012    Sterilization 6/23/2012       Past Surgical History:   Procedure Laterality Date    ABDOMINAL SURGERY      APPENDECTOMY      BLADDER SURGERY      partial cystectomy due to fistula    CKC      COLON SURGERY      COLONOSCOPY      EGD (ESOPHAGOGASTRODUODENOSCOPY) N/A 9/6/2013    Performed by Emilio Cameron MD at [facility] (4TH FLR)    ESOPHAGOGASTRODUODENOSCOPY (EGD) N/A 10/14/2016    Performed by Janelle Mcpherson MD at [facility] END[facility] (2ND FLR)    ESOPHAGOGASTRODUODENOSCOPY (EGD) N/A 10/23/2014    Performed by Nathanael Mitchell MD at [facility] END[facility] (2ND FLR)    EXAM UNDER ANESTHESIA N/A 8/29/2013    Performed by Bob Parsons MD at [facility] OR 2ND FLR    EXAM UNDER ANESTHESIA N/A 1/18/2013    Performed by Bob Parsons MD at [facility] OR 2ND FLR    HYSTERECTOMY-ABDOMINAL-TOTAL (SHELLIE) N/A 4/16/2015    Performed by [facility]ales [facility] at [facility] OR    ILEOSCOPY N/A 8/8/2017    Performed by Tommie Kelin MD at [facility] END[facility] (2ND FLR)    ILEOSCOPY N/A 10/14/2016    Performed by Janelle Mcpherson MD at [facility] (2ND FLR)    ILEOSCOPY N/A 9/25/2013    Performed by Emilio Cameron MD at [facility] (4TH FLR)    ILEOSTOMY      INCISION AND DRAINAGE, ABSCESS N/A 8/29/2013    Performed by Bob Parsons MD at [facility] OR 2ND FLR    PALGQNCJF-HHNL-J-CATH Left 2/21/2017    Performed by  Parish Sanchez Jr., MD at [facility] OR 2ND FLR    OOPHORECTOMY Right 04/16/2015    PORTACATH PLACEMENT  02/21/2017    REPAIR-BLADDER  4/16/2015    Performed by [facility]ix [facility]ales MD at [facility] OR    SALPINGO-OOPHERECTOMY Right 4/16/2015    Performed by Alix Morales MD at [facility] OR    SIGMOIDOSCOPY, FLEXIBLE N/A 2/7/2014    Performed by Erasto Sewell MD at [facility] ENDO (2ND FLR)    SKIN BIOPSY      SMALL INTESTINE SURGERY      TOTAL ABDOMINAL HYSTERECTOMY  04/16/2015    TOTAL COLECTOMY      TUBAL LIGATION  06/06/2012    UPPER GASTROINTESTINAL ENDOSCOPY         Review of patient's allergies indicates:   Allergen Reactions    Azathioprine sodium Other (See Comments)     Other reaction(s): pancreatitis  Other reaction(s): pancreatitis    Methotrexate analogues Other (See Comments)     leukopenia    Stelara [ustekinumab] Other (See Comments)     Multiple infections    Zofran [ondansetron hcl (pf)] Other (See Comments)     Per patient causes prolong QT    Vancomycin analogues Hives    Morphine Itching and Other (See Comments)     Other reaction(s): Itching    Bactrim [sulfamethoxazole-trimethoprim] Palpitations    Ciprofloxacin Palpitations       Current Neurological Medications: n/a    No current facility-administered medications on file prior to encounter.      Current Outpatient Medications on File Prior to Encounter   Medication Sig    ALPRAZolam (XANAX) 1 MG tablet Take 1 tablet (1 mg total) by mouth 2 (two) times daily. as needed for anxiety.    diphenoxylate-atropine 2.5-0.025 mg (LOMOTIL) 2.5-0.025 mg per tablet Take 1 tablet by mouth 4 (four) times daily as needed for Diarrhea.    dronabinol (MARINOL) 5 MG capsule Take 1 capsule (5 mg total) by mouth 2 (two) times daily before meals.    loperamide (IMODIUM) 2 mg capsule Take 2 mg by mouth daily as needed for Diarrhea.    magnesium 250 mg Tab Take by mouth once.    multivitamin (ONE DAILY MULTIVITAMIN) per tablet Take 1 tablet by mouth  once daily.    potassium citrate 15 mEq TbSR Take 2 tablets by mouth 2 (two) times daily.    promethazine (PHENERGAN) 25 MG tablet Take 1 tablet (25 mg total) by mouth every 6 (six) hours as needed.    valACYclovir (VALTREX) 500 MG tablet TK 1 T PO QD    clindamycin (CLINDESSE) 2 % vaginal cream PLACE VAGINALLY EVERY EVENING    flu vac [id number] 36mos up,PF, 60 mcg (15 mcg x 4)/0.5 mL Syrg inject into the muscle    oxyCODONE-acetaminophen (PERCOCET)  mg per tablet Take 1 tablet by mouth every 6 (six) hours as needed for Pain.    terconazole (TERAZOL 7) 0.4 % Crea INSERT ONE APPLICATORFUL VAGINALLY AT BEDTIME    zolpidem (AMBIEN) 10 mg Tab TAKE 1 TABLET BY MOUTH EVERY EVENING     Family History     Problem Relation (Age of Onset)    Cancer Father, Maternal Grandfather    Colon cancer Father    Crohn's disease Brother    Endometrial cancer Maternal Aunt    Hypertension Mother    Skin cancer Maternal Grandfather        Tobacco Use    Smoking status: Never Smoker    Smokeless tobacco: Never Used   Substance and Sexual Activity    Alcohol use: Yes     Alcohol/week: 0.0 oz     Comment: Patient only drinks wine not regular basis.    Drug use: No    Sexual activity: Yes     Partners: Male     Birth control/protection: Pill, Surgical, Condom     Comment: HYST     Review of Systems   Constitutional: Negative for activity change, chills and fever.        Flushing   HENT: Negative for sinus pressure and trouble swallowing.    Eyes: Negative for pain and visual disturbance.   Respiratory: Negative for cough, chest tightness and shortness of breath.    Cardiovascular: Positive for palpitations. Negative for chest pain.   Gastrointestinal: Negative for abdominal pain, diarrhea, nausea and vomiting.   Genitourinary: Negative for difficulty urinating and dysuria.   Musculoskeletal: Negative for back pain and neck pain.   Skin: Negative for rash and wound.   Neurological: Negative for weakness, numbness and  headaches.   Psychiatric/Behavioral: Negative for agitation and confusion.     Objective:     Vital Signs (Most Recent):  Temp: 98.4 °F (36.9 °C) (01/13/19 0802)  Pulse: 82 (01/13/19 0802)  Resp: 14 (01/13/19 0802)  BP: (!) 121/59 (01/13/19 0802)  SpO2: 100 % (01/13/19 0802) Vital Signs (24h Range):  Temp:  [98.4 °F (36.9 °C)-98.7 °F (37.1 °C)] 98.4 °F (36.9 °C)  Pulse:  [71-99] 82  Resp:  [14] 14  SpO2:  [96 %-100 %] 100 %  BP: (114-122)/(59-85) 121/59     Weight: 42.2 kg (93 lb)  Body mass index is 17.57 kg/m².    Physical Exam   Constitutional: She appears well-developed and well-nourished.   HENT:   Head: Normocephalic and atraumatic.   Eyes: EOM are normal.   Pulmonary/Chest: Effort normal. No respiratory distress.   Neurological:   Reflex Scores:       Tricep reflexes are 3+ on the right side and 3+ on the left side.       Bicep reflexes are 3+ on the right side and 3+ on the left side.       Brachioradialis reflexes are 3+ on the right side and 3+ on the left side.       Patellar reflexes are 3+ on the right side and 3+ on the left side.  Skin: Skin is warm and dry.   Psychiatric: She has a normal mood and affect. Her speech is normal and behavior is normal.   Nursing note and vitals reviewed.      NEUROLOGICAL EXAMINATION:     MENTAL STATUS   Attention: normal.   Speech: speech is normal   Level of consciousness: alert    CRANIAL NERVES     CN III, IV, VI   Extraocular motions are normal.     CN VII   Facial expression full, symmetric.     MOTOR EXAM   Muscle bulk: normal  Overall muscle tone: normal       Moving all four extremities     REFLEXES     Reflexes   Right brachioradialis: 3+  Left brachioradialis: 3+  Right biceps: 3+  Left biceps: 3+  Right triceps: 3+  Left triceps: 3+  Right patellar: 3+  Left patellar: 3+  Right plantar: normal  Left plantar: normal       +Knee clonus, +cross adduction        Significant Labs:   CBC:   Recent Labs   Lab 01/12/19  0642 01/13/19  0622   WBC 4.30 4.96   HGB  11.2* 10.6*   HCT 35.5* 33.2*    263     CMP:   Recent Labs   Lab 01/12/19  0642 01/13/19  0622   GLU 87 87    138   K 3.9 3.9    107   CO2 25 26   BUN 8 9   CREATININE 0.7 0.6   CALCIUM 9.1 9.0   MG 1.9 1.8   PROT 6.7 6.2   ALBUMIN 3.3* 3.0*   BILITOT 0.4 0.2   ALKPHOS 85 78   AST 12 13   ALT 8* 9*   ANIONGAP 9 5*   EGFRNONAA >60.0 >60.0       Significant Imaging: I have reviewed all pertinent imaging results/findings within the past 24 hours.    Assessment and Plan:     * Sinus tachycardia    -- see above  -- telemetry     Transient neurological symptoms    Episodes are concerning for complex partial seizures. Workup for endocrinologic and cardiac etiologies has been unrevealing. Exam was notable for brisk reflexes throughout, which suggests a CNS process.     CT head 8/30/18 unremarkable  Urine metanephrines mildly elevated (352 - range 0-300); endocrinology feel labs not supportive of pheo     Recommendations:  -- MRI brain with and without contrast ASAP - will need ativan 2mg x1 for claustrophobia   -- Long term vEEG with goal to capture episode  -- Seizure precautions  -- Telemetry     Palpitations    -- see above     Generalized anxiety disorder    -- reports significant worsening of anxiety since symptom onset, with minimal benefit from treatment so far     Crohn's disease of small intestine    -- previously on stelara which has PRES listed as adverse reaction - this was d/c 1 year ago  -- now on Entyvio infusions         VTE Risk Mitigation (From admission, onward)        Ordered     IP VTE LOW RISK PATIENT  Once      01/11/19 1922     Place JEANNA hose  Until discontinued      01/07/19 1903     Place sequential compression device  Until discontinued      01/07/19 1903          Thank you for your consult. I will follow-up with patient. Please contact us if you have any additional questions.    Siena Williamson MD  Neurology  [facility]

## 2019-01-14 LAB
ALBUMIN SERPL BCP-MCNC: 3 G/DL
ALP SERPL-CCNC: 81 U/L
ALT SERPL W/O P-5'-P-CCNC: 12 U/L
ANION GAP SERPL CALC-SCNC: 6 MMOL/L
AST SERPL-CCNC: 17 U/L
BACTERIA STL CULT: NORMAL
BASOPHILS # BLD AUTO: 0.02 K/UL
BASOPHILS NFR BLD: 0.4 %
BILIRUB SERPL-MCNC: 0.2 MG/DL
BUN SERPL-MCNC: 6 MG/DL
CALCIUM SERPL-MCNC: 9.1 MG/DL
CHLORIDE SERPL-SCNC: 106 MMOL/L
CO2 SERPL-SCNC: 27 MMOL/L
CREAT SERPL-MCNC: 0.7 MG/DL
DIFFERENTIAL METHOD: ABNORMAL
EOSINOPHIL # BLD AUTO: 0.1 K/UL
EOSINOPHIL NFR BLD: 2.5 %
ERYTHROCYTE [DISTWIDTH] IN BLOOD BY AUTOMATED COUNT: 14.8 %
EST. GFR  (AFRICAN AMERICAN): >60 ML/MIN/1.73 M^2
EST. GFR  (NON AFRICAN AMERICAN): >60 ML/MIN/1.73 M^2
GLUCOSE SERPL-MCNC: 92 MG/DL
HCT VFR BLD AUTO: 31.9 %
HGB BLD-MCNC: 10.3 G/DL
IMM GRANULOCYTES # BLD AUTO: 0.01 K/UL
IMM GRANULOCYTES NFR BLD AUTO: 0.2 %
LYMPHOCYTES # BLD AUTO: 2.2 K/UL
LYMPHOCYTES NFR BLD: 45.1 %
MAGNESIUM SERPL-MCNC: 1.7 MG/DL
MCH RBC QN AUTO: 28.5 PG
MCHC RBC AUTO-ENTMCNC: 32.3 G/DL
MCV RBC AUTO: 88 FL
MONOCYTES # BLD AUTO: 0.6 K/UL
MONOCYTES NFR BLD: 12.9 %
NEUTROPHILS # BLD AUTO: 1.9 K/UL
NEUTROPHILS NFR BLD: 38.9 %
NRBC BLD-RTO: 0 /100 WBC
PHOSPHATE SERPL-MCNC: 3.9 MG/DL
PLATELET # BLD AUTO: 254 K/UL
PMV BLD AUTO: 9.7 FL
POTASSIUM SERPL-SCNC: 4 MMOL/L
PROT SERPL-MCNC: 6.1 G/DL
RBC # BLD AUTO: 3.62 M/UL
SODIUM SERPL-SCNC: 139 MMOL/L
WBC # BLD AUTO: 4.88 K/UL

## 2019-01-14 PROCEDURE — 83735 ASSAY OF MAGNESIUM: CPT

## 2019-01-14 PROCEDURE — 36415 COLL VENOUS BLD VENIPUNCTURE: CPT

## 2019-01-14 PROCEDURE — 25000003 PHARM REV CODE 250: Performed by: INTERNAL MEDICINE

## 2019-01-14 PROCEDURE — 84100 ASSAY OF PHOSPHORUS: CPT

## 2019-01-14 PROCEDURE — 11000001 HC ACUTE MED/SURG PRIVATE ROOM

## 2019-01-14 PROCEDURE — 25000003 PHARM REV CODE 250: Performed by: HOSPITALIST

## 2019-01-14 PROCEDURE — 99232 PR SUBSEQUENT HOSPITAL CARE,LEVL II: ICD-10-PCS | Mod: ,,, | Performed by: HOSPITALIST

## 2019-01-14 PROCEDURE — 80053 COMPREHEN METABOLIC PANEL: CPT

## 2019-01-14 PROCEDURE — 63600175 PHARM REV CODE 636 W HCPCS: Performed by: HOSPITALIST

## 2019-01-14 PROCEDURE — 99232 SBSQ HOSP IP/OBS MODERATE 35: CPT | Mod: ,,, | Performed by: HOSPITALIST

## 2019-01-14 PROCEDURE — 85025 COMPLETE CBC W/AUTO DIFF WBC: CPT

## 2019-01-14 PROCEDURE — 99233 SBSQ HOSP IP/OBS HIGH 50: CPT | Mod: ,,, | Performed by: PSYCHIATRY & NEUROLOGY

## 2019-01-14 PROCEDURE — 99233 PR SUBSEQUENT HOSPITAL CARE,LEVL III: ICD-10-PCS | Mod: ,,, | Performed by: PSYCHIATRY & NEUROLOGY

## 2019-01-14 RX ORDER — MIRTAZAPINE 15 MG/1
15 TABLET, FILM COATED ORAL NIGHTLY
Status: DISCONTINUED | OUTPATIENT
Start: 2019-01-14 | End: 2019-01-16 | Stop reason: HOSPADM

## 2019-01-14 RX ADMIN — CLONAZEPAM 1 MG: 1 TABLET ORAL at 10:01

## 2019-01-14 RX ADMIN — LOPERAMIDE HYDROCHLORIDE 2 MG: 2 CAPSULE ORAL at 05:01

## 2019-01-14 RX ADMIN — PANTOPRAZOLE SODIUM 40 MG: 40 TABLET, DELAYED RELEASE ORAL at 05:01

## 2019-01-14 RX ADMIN — MIRTAZAPINE 15 MG: 15 TABLET, FILM COATED ORAL at 09:01

## 2019-01-14 RX ADMIN — CLONAZEPAM 1 MG: 1 TABLET ORAL at 09:01

## 2019-01-14 RX ADMIN — PROMETHAZINE HYDROCHLORIDE 25 MG: 25 TABLET ORAL at 05:01

## 2019-01-14 RX ADMIN — OXYCODONE HYDROCHLORIDE AND ACETAMINOPHEN 1 TABLET: 10; 325 TABLET ORAL at 04:01

## 2019-01-14 RX ADMIN — POTASSIUM CITRATE 30 MEQ: 10 TABLET ORAL at 09:01

## 2019-01-14 RX ADMIN — DIPHENOXYLATE HYDROCHLORIDE AND ATROPINE SULFATE 1 TABLET: 2.5; .025 TABLET ORAL at 11:01

## 2019-01-14 RX ADMIN — SODIUM CHLORIDE, SODIUM LACTATE, POTASSIUM CHLORIDE, AND CALCIUM CHLORIDE: .6; .31; .03; .02 INJECTION, SOLUTION INTRAVENOUS at 08:01

## 2019-01-14 RX ADMIN — DEXTROSE, SODIUM CHLORIDE, SODIUM LACTATE, POTASSIUM CHLORIDE, AND CALCIUM CHLORIDE 500 ML: 5; .6; .31; .03; .02 INJECTION, SOLUTION INTRAVENOUS at 11:01

## 2019-01-14 RX ADMIN — LOPERAMIDE HYDROCHLORIDE 2 MG: 2 CAPSULE ORAL at 09:01

## 2019-01-14 RX ADMIN — DIPHENOXYLATE HYDROCHLORIDE AND ATROPINE SULFATE 1 TABLET: 2.5; .025 TABLET ORAL at 05:01

## 2019-01-14 RX ADMIN — AMOXICILLIN AND CLAVULANATE POTASSIUM 1 TABLET: 875; 125 TABLET, FILM COATED ORAL at 09:01

## 2019-01-14 RX ADMIN — ZOLPIDEM TARTRATE 5 MG: 5 TABLET ORAL at 10:01

## 2019-01-14 RX ADMIN — MAGNESIUM OXIDE TAB 400 MG (241.3 MG ELEMENTAL MG) 400 MG: 400 (241.3 MG) TAB at 09:01

## 2019-01-14 RX ADMIN — SODIUM CHLORIDE, SODIUM LACTATE, POTASSIUM CHLORIDE, AND CALCIUM CHLORIDE: .6; .31; .03; .02 INJECTION, SOLUTION INTRAVENOUS at 09:01

## 2019-01-14 RX ADMIN — PANTOPRAZOLE SODIUM 40 MG: 40 TABLET, DELAYED RELEASE ORAL at 06:01

## 2019-01-14 RX ADMIN — OXYCODONE HYDROCHLORIDE AND ACETAMINOPHEN 1 TABLET: 10; 325 TABLET ORAL at 09:01

## 2019-01-14 RX ADMIN — OXYCODONE HYDROCHLORIDE AND ACETAMINOPHEN 1 TABLET: 10; 325 TABLET ORAL at 11:01

## 2019-01-14 NOTE — HOSPITAL COURSE
Urinary metanephrines only slightly elevated, not suggestive of pheo per endocrine, endocrine signed off.  ID with recs to continue augmentin, follow up with urology for recurrent UTI  GI following for diarrhea, improving with lomotil and loperamide  Klonopin increased to 1 mg BID  Neuro now on board to rule out complex partial seizures?  MRI with hyperintensities of unclear significance, slight abnormality in posterior pituitary.  Video EEG pending.  QTc 1/7 462.

## 2019-01-14 NOTE — SUBJECTIVE & OBJECTIVE
"Interval History: Patient has had no additional episodes since Friday.  She has no complaints today.  Family, friend, boyfriend, daughter came by over the weekend.  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  Denies any side effects of Remeron but notes, "I don't think it's doing anything."  Still requiring Ambien for sleep.  Appetite is "okay."    Overall mood is fair. Denies SI/HI/AVH.      Family History     Problem Relation (Age of Onset)    Cancer Father, Maternal Grandfather    Colon cancer Father    Crohn's disease Brother    Endometrial cancer Maternal Aunt    Hypertension Mother    Skin cancer Maternal Grandfather        Tobacco Use    Smoking status: Never Smoker    Smokeless tobacco: Never Used   Substance and Sexual Activity    Alcohol use: Yes     Alcohol/week: 0.0 oz     Comment: Patient only drinks wine not regular basis.    Drug use: No    Sexual activity: Yes     Partners: Male     Birth control/protection: Pill, Surgical, Condom     Comment: HYST     Psychotherapeutics (From admission, onward)    Start     Stop Route Frequency Ordered    01/11/19 2100  mirtazapine tablet 7.5 mg      -- Oral Nightly 01/11/19 1650    01/07/19 2217  zolpidem tablet 5 mg      -- Oral Nightly PRN 01/07/19 2117           Review of Systems  Objective:     Vital Signs (Most Recent):  Temp: 98.3 °F (36.8 °C) (01/14/19 0823)  Pulse: 82 (01/14/19 0823)  Resp: 14 (01/14/19 0823)  BP: 109/69 (01/14/19 0823)  SpO2: 100 % (01/14/19 0823) Vital Signs (24h Range):  Temp:  [98 °F (36.7 °C)-98.5 °F (36.9 °C)] 98.3 °F (36.8 °C)  Pulse:  [68-96] 82  Resp:  [10-20] 14  SpO2:  [97 %-100 %] 100 %  BP: ()/(63-85) 109/69     Height: 5' 1" (154.9 cm)  Weight: 42.2 kg (93 lb)  Body mass index is 17.57 kg/m².      Intake/Output Summary (Last 24 hours) at 1/14/2019 1016  Last data filed at 1/14/2019 0900  Gross per 24 hour   Intake 720 ml   Output 2250 ml   Net -1530 ml       Physical " "Exam      Physical Exam   Psychiatric:   Appearance: thin female wearing casual clothing in NAD  Behavior:  calm, cooperative  Speech:  normal rate, tone, and volume  Mood: "fine"  Affect: appropriate, full  Thought Process:  linear  Thought Perceptions:  denied AVH  Thought Content:  denied SI, HI; no delusions apparent  Sensorium:  awake, alert  Attention/Concentration:  intact to conversation  Orientation:  person, place, time, and situation  Memory:  intact (recent, remote)  Abstraction:  intact   Insight:  fair  Judgment:  good       Significant Labs:   Last 24 Hours:   Recent Lab Results       01/14/19  0554        Immature Granulocytes 0.2     Immature Grans (Abs) 0.01  Comment:  Mild elevation in immature granulocytes is non specific and   can be seen in a variety of conditions including stress response,   acute inflammation, trauma and pregnancy. Correlation with other   laboratory and clinical findings is essential.       Albumin 3.0     Alkaline Phosphatase 81     ALT 12     Anion Gap 6     AST 17     Baso # 0.02     Basophil% 0.4     Total Bilirubin 0.2  Comment:  For infants and newborns, interpretation of results should be based  on gestational age, weight and in agreement with clinical  observations.  Premature Infant recommended reference ranges:  Up to 24 hours.............<8.0 mg/dL  Up to 48 hours............<12.0 mg/dL  3-5 days..................<15.0 mg/dL  6-29 days.................<15.0 mg/dL       BUN, Bld 6     Calcium 9.1     Chloride 106     CO2 27     Creatinine 0.7     Differential Method Automated     eGFR if African American >60.0     eGFR if non  >60.0  Comment:  Calculation used to obtain the estimated glomerular filtration  rate (eGFR) is the CKD-EPI equation.        Eos # 0.1     Eosinophil% 2.5     Glucose 92     Gran # (ANC) 1.9     Gran% 38.9     Hematocrit 31.9     Hemoglobin 10.3     Lymph # 2.2     Lymph% 45.1     Magnesium 1.7     MCH 28.5     MCHC 32.3     " MCV 88     Mono # 0.6     Mono% 12.9     MPV 9.7     nRBC 0     Phosphorus 3.9     Platelets 254     Potassium 4.0     Total Protein 6.1     RBC 3.62     RDW 14.8     Sodium 139     WBC 4.88           Significant Imaging: MRI: I have reviewed all pertinent results/findings within the past 24 hours.

## 2019-01-14 NOTE — PROGRESS NOTES
Progress Note  Hospital Medicine    Admit Date: 1/7/2019  Length of Stay:  LOS: 7 days     SUBJECTIVE:         Follow-up For:  Sinus tachycardia    HPI/Interval history (See H&P for complete P,F,SHx) :      urine cultures - klebsiella sensitive to ceftriaxone switched to augmentin (day 2/3) for a complicated UTI. s/p  psychiatry consulted for possible panic attacks/ depression - PRN Klonopin 0.5mg--> 1mg  BID prn symptomatic relief. started on remeron   . improving  ostomy output - started on loperamide QID and lomotil TID- stool for Clost diff assay negative. No evidence of pheochromocytoma per Endo. Neurology consulted for possible seizures - MRI brain - No acute intracranial abnormality to explain the patient's provided clinical history of seizure. 24hr EEG pending. RL Bolus 500ml xonce for boderline SBP 89    Review of Systems: List if applicable  Review of Systems   Constitutional: Positive for diaphoresis and unexpected weight change (7lb weight loss). Negative for activity change, appetite change, chills, fatigue and fever.   Eyes: Negative for photophobia and visual disturbance.   Respiratory: Negative for apnea, cough, choking, shortness of breath, wheezing and stridor.    Cardiovascular: Positive for palpitations.(resolved)  Negative for chest pain and leg swelling.   Gastrointestinal: Negative for abdominal pain, blood in stool, constipation, diarrhea, nausea and vomiting.   Endocrine: Negative for cold intolerance and heat intolerance.   Genitourinary: Positive for frequency. Negative for difficulty urinating.   Musculoskeletal: Negative for arthralgias and myalgias.   Skin: Negative for rash and wound.   Allergic/Immunologic: Negative for immunocompromised state.   Neurological: Negative for dizziness, weakness and headaches.   Psychiatric/Behavioral: Negative for agitation, behavioral problems and confusion.       OBJECTIVE:     Vital Signs Range (Last 24H):  Temp:  [98 °F (36.7 °C)-98.6 °F (37 °C)]    Pulse:  []   Resp:  [10-20]   BP: ()/()   SpO2:  [97 %-100 %]     Physical Exam:  Physical Exam   Constitutional: She is oriented to person, place, and time. She appears well-developed and well-nourished. No distress.   Thin, tremulous   HENT:   Head: Normocephalic and atraumatic.   Mouth/Throat: No oropharyngeal exudate.   Eyes: Conjunctivae and EOM are normal. Pupils are equal, round, and reactive to light. No scleral icterus.   Neck: Normal range of motion. Neck supple. No tracheal deviation present. No thyromegaly present.   Cardiovascular: Regular rhythm, normal heart sounds and intact distal pulses. Tachycardia present.   No murmur heard.  Pulmonary/Chest: Effort normal and breath sounds normal. No respiratory distress. She has no wheezes. She has no rales. She exhibits no tenderness.   Abdominal: Soft. Bowel sounds are normal. She exhibits no distension. There is no tenderness. There is no rebound and no guarding.   +ve Ileostomy   Musculoskeletal: Normal range of motion. She exhibits no edema or tenderness.   Lymphadenopathy:     She has no cervical adenopathy.   Neurological: She is alert and oriented to person, place, and time.   Skin: Skin is warm and dry. No rash noted. She is not diaphoretic. No erythema. No pallor.   Psychiatric: She has a normal mood and affect. Her behavior is normal. Judgment and thought content normal.        CN III, IV, VI   Pupils are equal, round, and reactive to light.  Extraocular motions are normal.                Medications:  Medication list was reviewed and changes noted under Assessment/Plan.      Current Facility-Administered Medications:     acetaminophen tablet 650 mg, 650 mg, Oral, Q8H PRN, Charlette Santana MD, 650 mg at 01/10/19 1815    amoxicillin-clavulanate 875-125mg per tablet 1 tablet, 1 tablet, Oral, Q12H, Inga Briones MD, 1 tablet at 01/13/19 2131    clonazePAM tablet 1 mg, 1 mg, Oral, BID PRN, eS Kolb MD, 1 mg  at 01/13/19 2323    diphenoxylate-atropine 2.5-0.025 mg per tablet 1 tablet, 1 tablet, Oral, TID WM, Se Kolb MD, 1 tablet at 01/13/19 1720    lactated ringers infusion, , Intravenous, Continuous, Se Kolb MD, Last Rate: 100 mL/hr at 01/13/19 0629    loperamide capsule 2 mg, 2 mg, Oral, QID, Se Kolb MD, 2 mg at 01/13/19 2131    magnesium oxide tablet 400 mg, 400 mg, Oral, Daily, Charlette Santana MD, 400 mg at 01/13/19 0918    mirtazapine tablet 7.5 mg, 7.5 mg, Oral, QHS, Se Kolb MD, 7.5 mg at 01/13/19 2131    oxyCODONE-acetaminophen  mg per tablet 1 tablet, 1 tablet, Oral, Q6H PRN, Charlette Santana MD, 1 tablet at 01/14/19 0400    pantoprazole EC tablet 40 mg, 40 mg, Oral, BID AC, Se Kolb MD, 40 mg at 01/14/19 0608    potassium citrate SR tablet 30 mEq, 30 mEq, Oral, BID, Charlette Santana MD, 30 mEq at 01/13/19 2131    promethazine tablet 25 mg, 25 mg, Oral, Q6H PRN, Charlette Santana MD, 25 mg at 01/13/19 1803    sodium chloride 0.9% flush 5 mL, 5 mL, Intravenous, PRN, Lubna Florez PA-C    sodium chloride 0.9% flush 5 mL, 5 mL, Intravenous, PRN, Charlette Santana MD    zolpidem tablet 5 mg, 5 mg, Oral, Nightly PRN, Charlette Santana MD, 5 mg at 01/13/19 2323    acetaminophen, clonazePAM, oxyCODONE-acetaminophen, promethazine, sodium chloride 0.9%, sodium chloride 0.9%, zolpidem    Laboratory/Diagnostic Data:  Reviewed and noted in plan where applicable- Please see chart for full lab data.    Recent Labs   Lab 01/12/19  0642 01/13/19  0622 01/14/19  0554   WBC 4.30 4.96 4.88   HGB 11.2* 10.6* 10.3*   HCT 35.5* 33.2* 31.9*    263 254       Recent Labs   Lab 01/12/19  0642 01/13/19  0622 01/14/19  0554    138 139   K 3.9 3.9 4.0    107 106   CO2 25 26 27   BUN 8 9 6   CREATININE 0.7 0.6 0.7   GLU 87 87 92   CALCIUM 9.1 9.0 9.1   MG 1.9 1.8 1.7   PHOS 4.0 3.9 3.9       Recent  Labs   Lab 01/12/19  0642 01/13/19  0622 01/14/19  0554   ALKPHOS 85 78 81   ALT 8* 9* 12   AST 12 13 17   ALBUMIN 3.3* 3.0* 3.0*   PROT 6.7 6.2 6.1   BILITOT 0.4 0.2 0.2        Microbiology labs for the last week  Microbiology Results (last 7 days)     Procedure Component Value Units Date/Time    Blood Culture #2 **CANNOT BE ORDERED STAT** [[id number]] Collected:  01/07/19 1717    Order Status:  Completed Specimen:  Blood from Peripheral, Wrist, Left Updated:  01/12/19 1812     Blood Culture, Routine No growth after 5 days.    Blood Culture #1 **CANNOT BE ORDERED STAT** [[id number]] Collected:  01/07/19 1712    Order Status:  Completed Specimen:  Blood from Peripheral, Forearm, Left Updated:  01/12/19 1812     Blood Culture, Routine No growth after 5 days.    Stool culture [[id number]] Collected:  01/09/19 1730    Order Status:  Completed Specimen:  Stool Updated:  01/12/19 1102     Stool Culture Nothing significant to date    Urine culture [[id number]]  (Susceptibility) Collected:  01/07/19 1723    Order Status:  Completed Specimen:  Urine Updated:  01/10/19 1549     Urine Culture, Routine --     KLEBSIELLA PNEUMONIAE  >100,000 cfu/ml      Narrative:       add on test urine toxicology per dr nohemy marks order #[id number]   01/07/2019  22:58     Clostridium difficile EIA [[id number]] Collected:  01/09/19 1730    Order Status:  Completed Specimen:  Stool Updated:  01/10/19 0531     C. diff Antigen Negative     C difficile Toxins A+B, EIA Negative     Comment: Testing not recommended for children <24 months old.       E. coli 0157 antigen [[id number]] Collected:  01/09/19 1730    Order Status:  Canceled Specimen:  Stool Updated:  01/09/19 2340           Imaging Results          X-Ray Chest PA And Lateral (Final result)  Result time 01/07/19 17:34:50    Final result by Suzi Martinez MD (01/07/19 17:34:50)                 Impression:      No acute cardiopulmonary abnormality.      Electronically signed by: Suzi  "Juan  Date:    01/07/2019  Time:    17:34             Narrative:    EXAMINATION:  XR CHEST PA AND LATERAL    CLINICAL HISTORY:  Fever, unspecified    TECHNIQUE:  PA and lateral views of the chest were performed.    COMPARISON:  Multiple priors, most recent 12/11/2018    FINDINGS:  Stable positioning of a left chest wall port catheter.The lungs are clear.  No focal consolidation, pleural effusion or pneumothorax.    Normal cardiomediastinal contour.    No acute or aggressive osseous abnormality. Scoliosis.                                Estimated body mass index is 17.57 kg/m² as calculated from the following:    Height as of this encounter: 5' 1" (1.549 m).    Weight as of this encounter: 42.2 kg (93 lb).    I & O (Last 24H):    Intake/Output Summary (Last 24 hours) at 1/14/2019 0726  Last data filed at 1/14/2019 0600  Gross per 24 hour   Intake 840 ml   Output 2450 ml   Net -1610 ml       Estimated Creatinine Clearance: 74 mL/min (based on SCr of 0.7 mg/dL).    ASSESSMENT/PLAN:     Active Problems:      Active Hospital Problems    Diagnosis  POA    *Sinus tachycardia [R00.0]Multiple episodes of flushing, tachycardia, diaphoresis, anxiety over the course of the year. Being worked up by endocrinology for pheo vs carcinoid syndrome.Dr. Landry  recommended patient commence 24h urine metanephrine collection and follow up in clinic.   - Previous metanephrines, catecholamines, 5HIAA, chromogranin A levels unremarkable. 24hr urine metanephrines slightly elevated. not taking metoprolol as per endocrine advice   Catecholamine levels mildly above normal. endocrine consulted 1/12  FT4,- normal   Hemodynamically stable.   No evidence of pheochromocytoma per Endo. Neurology consulted for possible seizures - MRI brain - No acute intracranial abnormality to explain the patient's provided clinical history of seizure. 24hr EEG pending        Generalized anxiety disorder [F41.1] r/ o Panic attack - on Xanax PRN -   Had an episode " of flushing with tachycardia  117 with SBP 150s this AM Telemetry with sinus tachycardia. c/o dysuria and left flank resolved now. urine cultures -gram negative rods. tearful and admits to depression psychiatry consulted for possible panic attacks/ depression . admits stressors at home. previosly on celexa but discontinued dut prolonged QT. no psyc follow up since  1year    s/p psych eval on 1/10/19 - xanax discontinued    s/p  psychiatry consulted for possible panic attacks/ depression - PRN Klonopin 0.5mg--> 1mg  BID prn symptomatic relief. started on remeron   . i      Weight loss - 17 lb in 1 year. Nutrition consulted.started on remeron . Marinol may actually be worsening anxiety, nausea, vasodilation at this point and hasn't been helpful for appetite stimulation. Marinol discontinued      Diarrhea/ increased ostomy output /orthostasis -  .    Improving . started on RL hydration - 100ml /hr for orthostais - for increased ostomy output - started on loperamide QID and lomotil TID- stool for Clost diff assay negative. GI and ID Consulted     Borderline BP - . RL Bolus 500ml x once for boderline SBP 89. obtain orthostasis     Yes    Hypomagnesemia [E83.42]replaced   Unknown    Anemia [D64.9]Hb 10.2 normocytis. stable. monitor   Unknown    Urinary tract infection with hematuria [N39.0, R31.9]/ pyelonerphritis -  urine cultures - klebsiella sensitive to ceftriaxone switched to augmentin (day 3/3) for a complicated UTI.Follows w/ Urology OP. 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 on Mirtazapine - dose increased to 15mg nightly  Yes    Crohn's disease of small intestine [K50.00]     Chronic, stable, s/p ileostomy. Output about the same.   Continue lomotil/loperamide prn.   Continue Oxy IR for pain relief, prn. on entyvio      Yes     Chronic      Yes      Resolved Hospital Problems   No resolved problems to display.         Disposition- home    DVT prophylaxis addressed with:MARIA EUGENIA Kolb MD  Attending Staff Physician  [facility]  pager- [phone]  Spectral[employer]ph - [phone]

## 2019-01-14 NOTE — PLAN OF CARE
Problem: Adult Inpatient Plan of Care  Goal: Plan of Care Review  Outcome: Ongoing (interventions implemented as appropriate)  Pt is free from falls trauma and injuries. Bed in low position with call light in reach. Skin is warm and dry. VS are stable. No noted fever. Medicated as ordered. Dressing to port changed with sterile technique.

## 2019-01-14 NOTE — SUBJECTIVE & OBJECTIVE
Subjective:     Interval History: NAEON. Vitals stable.     Current Neurological Medications: Mirtazapine    Current Facility-Administered Medications   Medication Dose Route Frequency Provider Last Rate Last Dose    acetaminophen tablet 650 mg  650 mg Oral Q8H PRN Charlette Santana MD   650 mg at 01/10/19 1815    clonazePAM tablet 1 mg  1 mg Oral BID PRN Se Kolb MD   1 mg at 01/14/19 0919    diphenoxylate-atropine 2.5-0.025 mg per tablet 1 tablet  1 tablet Oral TID WM Se Kolb MD   1 tablet at 01/14/19 1125    lactated ringers infusion   Intravenous Continuous Se Kolb  mL/hr at 01/14/19 0819      loperamide capsule 2 mg  2 mg Oral QID Se Kolb MD   2 mg at 01/14/19 0919    magnesium oxide tablet 400 mg  400 mg Oral Daily Charlette Santana MD   400 mg at 01/14/19 0919    mirtazapine tablet 15 mg  15 mg Oral QHS Se Kolb MD        oxyCODONE-acetaminophen  mg per tablet 1 tablet  1 tablet Oral Q6H PRN Charlette Santana MD   1 tablet at 01/14/19 1125    pantoprazole EC tablet 40 mg  40 mg Oral BID AC Se Kolb MD   40 mg at 01/14/19 0608    potassium citrate SR tablet 30 mEq  30 mEq Oral BID Charlette Santana MD   30 mEq at 01/14/19 0919    promethazine tablet 25 mg  25 mg Oral Q6H PRN Charlette Santana MD   25 mg at 01/13/19 1803    sodium chloride 0.9% flush 5 mL  5 mL Intravenous PRN Lubna Florez PA-C        sodium chloride 0.9% flush 5 mL  5 mL Intravenous PRN Charlette Santana MD        zolpidem tablet 5 mg  5 mg Oral Nightly PRN Charlette Santana MD   5 mg at 01/13/19 2323       Review of Systems   Constitutional: Negative for fever.   Eyes: Negative for visual disturbance.   Neurological: Positive for tremors. Negative for weakness, numbness and headaches.   Psychiatric/Behavioral: Negative for agitation and confusion.     Objective:     Vital Signs (Most  Recent):  Temp: 98.3 °F (36.8 °C) (01/14/19 0823)  Pulse: 81 (01/14/19 1505)  Resp: 14 (01/14/19 0823)  BP: 109/69 (01/14/19 0823)  SpO2: 100 % (01/14/19 1200) Vital Signs (24h Range):  Temp:  [98 °F (36.7 °C)-98.5 °F (36.9 °C)] 98.3 °F (36.8 °C)  Pulse:  [68-95] 81  Resp:  [10-20] 14  SpO2:  [97 %-100 %] 100 %  BP: ()/(63-85) 109/69     Weight: 42.2 kg (93 lb)  Body mass index is 17.57 kg/m².    Physical Exam   Constitutional: She is oriented to person, place, and time. No distress.   Eyes: EOM are normal. Pupils are equal, round, and reactive to light.   Pulmonary/Chest: Effort normal.   Neurological: She is alert and oriented to person, place, and time.   Reflex Scores:       Bicep reflexes are 2+ on the right side and 2+ on the left side.       Brachioradialis reflexes are 2+ on the right side and 2+ on the left side.       Patellar reflexes are 3+ on the right side and 3+ on the left side.       Achilles reflexes are 3+ on the right side and 3+ on the left side.  Psychiatric: Her speech is normal.   Nursing note and vitals reviewed.      NEUROLOGICAL EXAMINATION:     MENTAL STATUS   Oriented to person, place, and time.   Oriented to person.   Oriented to place.   Oriented to time.   Speech: speech is normal   Level of consciousness: alert    CRANIAL NERVES     CN III, IV, VI   Pupils are equal, round, and reactive to light.  Extraocular motions are normal.     CN VII   Facial expression full, symmetric.     MOTOR EXAM   Overall muscle tone: normal       Strength 5/5 in all four extremities.     REFLEXES     Reflexes   Right brachioradialis: 2+  Left brachioradialis: 2+  Right biceps: 2+  Left biceps: 2+  Right patellar: 3+  Left patellar: 3+  Right achilles: 3+  Left achilles: 3+    GAIT AND COORDINATION     Tremor   Resting tremor: absent      Significant Labs:   Recent Lab Results       01/14/19  0554        Immature Granulocytes 0.2     Immature Grans (Abs) 0.01  Comment:  Mild elevation in immature  granulocytes is non specific and   can be seen in a variety of conditions including stress response,   acute inflammation, trauma and pregnancy. Correlation with other   laboratory and clinical findings is essential.       Albumin 3.0     Alkaline Phosphatase 81     ALT 12     Anion Gap 6     AST 17     Baso # 0.02     Basophil% 0.4     Total Bilirubin 0.2  Comment:  For infants and newborns, interpretation of results should be based  on gestational age, weight and in agreement with clinical  observations.  Premature Infant recommended reference ranges:  Up to 24 hours.............<8.0 mg/dL  Up to 48 hours............<12.0 mg/dL  3-5 days..................<15.0 mg/dL  6-29 days.................<15.0 mg/dL       BUN, Bld 6     Calcium 9.1     Chloride 106     CO2 27     Creatinine 0.7     Differential Method Automated     eGFR if African American >60.0     eGFR if non  >60.0  Comment:  Calculation used to obtain the estimated glomerular filtration  rate (eGFR) is the CKD-EPI equation.        Eos # 0.1     Eosinophil% 2.5     Glucose 92     Gran # (ANC) 1.9     Gran% 38.9     Hematocrit 31.9     Hemoglobin 10.3     Lymph # 2.2     Lymph% 45.1     Magnesium 1.7     MCH 28.5     MCHC 32.3     MCV 88     Mono # 0.6     Mono% 12.9     MPV 9.7     nRBC 0     Phosphorus 3.9     Platelets 254     Potassium 4.0     Total Protein 6.1     RBC 3.62     RDW 14.8     Sodium 139     WBC 4.88           Significant Imaging: I have reviewed all pertinent imaging results/findings within the past 24 hours.

## 2019-01-14 NOTE — ASSESSMENT & PLAN NOTE
Episodes are concerning for complex partial seizures. Workup for endocrinologic and cardiac etiologies has been unrevealing. Exam was notable for brisk reflexes throughout, which suggests a CNS process.     CT head 8/30/18 unremarkable  Urine metanephrines mildly elevated (352 - range 0-300); endocrinology feel labs not supportive of pheo     Assessment and Recommendations:  -- MRI brain shows no acute abnormality apart from posterior pituitary enhancement. Per radiology it is within normal limits. Pituitary panel after discussing with primary.  -- 24 hr EEG pending  -- Seizure precautions  -- Telemetry

## 2019-01-14 NOTE — PROGRESS NOTES
[facility]  Neurology  Progress Note    Patient Name: Ivy Salgado  MRN: [id number]  Admission Date: 1/7/2019  Hospital Length of Stay: 7 days  Code Status: Full Code   Attending Provider: Se Kolb MD  Primary Care Physician: Kalia Astorga MD   Principal Problem:Sinus tachycardia      Subjective:     Interval History: NAEON. Vitals stable.     Current Neurological Medications: Mirtazapine    Current Facility-Administered Medications   Medication Dose Route Frequency Provider Last Rate Last Dose    acetaminophen tablet 650 mg  650 mg Oral Q8H PRN Charlette Santana MD   650 mg at 01/10/19 1815    clonazePAM tablet 1 mg  1 mg Oral BID PRN Se Kolb MD   1 mg at 01/14/19 0919    diphenoxylate-atropine 2.5-0.025 mg per tablet 1 tablet  1 tablet Oral TID WM Se Kolb MD   1 tablet at 01/14/19 1125    lactated ringers infusion   Intravenous Continuous Se Kolb  mL/hr at 01/14/19 0819      loperamide capsule 2 mg  2 mg Oral QID Se Kolb MD   2 mg at 01/14/19 0919    magnesium oxide tablet 400 mg  400 mg Oral Daily Charlette Santana MD   400 mg at 01/14/19 0919    mirtazapine tablet 15 mg  15 mg Oral QHS Se Kolb MD        oxyCODONE-acetaminophen  mg per tablet 1 tablet  1 tablet Oral Q6H PRN Charlette Santana MD   1 tablet at 01/14/19 1125    pantoprazole EC tablet 40 mg  40 mg Oral BID AC Se Kolb MD   40 mg at 01/14/19 0608    potassium citrate SR tablet 30 mEq  30 mEq Oral BID Charlette Santana MD   30 mEq at 01/14/19 0919    promethazine tablet 25 mg  25 mg Oral Q6H PRN Charlette Santana MD   25 mg at 01/13/19 1803    sodium chloride 0.9% flush 5 mL  5 mL Intravenous PRN Lubna Florez PA-C        sodium chloride 0.9% flush 5 mL  5 mL Intravenous PRN Charlette Santana MD        zolpidem tablet 5 mg  5 mg Oral Nightly PRN Charlette Santana MD    5 mg at 01/13/19 4428       Review of Systems   Constitutional: Negative for fever.   Eyes: Negative for visual disturbance.   Neurological: Positive for tremors. Negative for weakness, numbness and headaches.   Psychiatric/Behavioral: Negative for agitation and confusion.     Objective:     Vital Signs (Most Recent):  Temp: 98.3 °F (36.8 °C) (01/14/19 0823)  Pulse: 81 (01/14/19 1505)  Resp: 14 (01/14/19 0823)  BP: 109/69 (01/14/19 0823)  SpO2: 100 % (01/14/19 1200) Vital Signs (24h Range):  Temp:  [98 °F (36.7 °C)-98.5 °F (36.9 °C)] 98.3 °F (36.8 °C)  Pulse:  [68-95] 81  Resp:  [10-20] 14  SpO2:  [97 %-100 %] 100 %  BP: ()/(63-85) 109/69     Weight: 42.2 kg (93 lb)  Body mass index is 17.57 kg/m².    Physical Exam   Constitutional: She is oriented to person, place, and time. No distress.   Eyes: EOM are normal. Pupils are equal, round, and reactive to light.   Pulmonary/Chest: Effort normal.   Neurological: She is alert and oriented to person, place, and time.   Reflex Scores:       Bicep reflexes are 2+ on the right side and 2+ on the left side.       Brachioradialis reflexes are 2+ on the right side and 2+ on the left side.       Patellar reflexes are 3+ on the right side and 3+ on the left side.       Achilles reflexes are 3+ on the right side and 3+ on the left side.  Psychiatric: Her speech is normal.   Nursing note and vitals reviewed.      NEUROLOGICAL EXAMINATION:     MENTAL STATUS   Oriented to person, place, and time.   Oriented to person.   Oriented to place.   Oriented to time.   Speech: speech is normal   Level of consciousness: alert    CRANIAL NERVES     CN III, IV, VI   Pupils are equal, round, and reactive to light.  Extraocular motions are normal.     CN VII   Facial expression full, symmetric.     MOTOR EXAM   Overall muscle tone: normal       Strength 5/5 in all four extremities.     REFLEXES     Reflexes   Right brachioradialis: 2+  Left brachioradialis: 2+  Right biceps: 2+  Left biceps:  2+  Right patellar: 3+  Left patellar: 3+  Right achilles: 3+  Left achilles: 3+    GAIT AND COORDINATION     Tremor   Resting tremor: absent      Significant Labs:   Recent Lab Results       01/14/19  0554        Immature Granulocytes 0.2     Immature Grans (Abs) 0.01  Comment:  Mild elevation in immature granulocytes is non specific and   can be seen in a variety of conditions including stress response,   acute inflammation, trauma and pregnancy. Correlation with other   laboratory and clinical findings is essential.       Albumin 3.0     Alkaline Phosphatase 81     ALT 12     Anion Gap 6     AST 17     Baso # 0.02     Basophil% 0.4     Total Bilirubin 0.2  Comment:  For infants and newborns, interpretation of results should be based  on gestational age, weight and in agreement with clinical  observations.  Premature Infant recommended reference ranges:  Up to 24 hours.............<8.0 mg/dL  Up to 48 hours............<12.0 mg/dL  3-5 days..................<15.0 mg/dL  6-29 days.................<15.0 mg/dL       BUN, Bld 6     Calcium 9.1     Chloride 106     CO2 27     Creatinine 0.7     Differential Method Automated     eGFR if African American >60.0     eGFR if non  >60.0  Comment:  Calculation used to obtain the estimated glomerular filtration  rate (eGFR) is the CKD-EPI equation.        Eos # 0.1     Eosinophil% 2.5     Glucose 92     Gran # (ANC) 1.9     Gran% 38.9     Hematocrit 31.9     Hemoglobin 10.3     Lymph # 2.2     Lymph% 45.1     Magnesium 1.7     MCH 28.5     MCHC 32.3     MCV 88     Mono # 0.6     Mono% 12.9     MPV 9.7     nRBC 0     Phosphorus 3.9     Platelets 254     Potassium 4.0     Total Protein 6.1     RBC 3.62     RDW 14.8     Sodium 139     WBC 4.88           Significant Imaging: I have reviewed all pertinent imaging results/findings within the past 24 hours.    Assessment and Plan:     * Sinus tachycardia    -- see above  -- telemetry     Transient neurological symptoms     Episodes are concerning for complex partial seizures. Workup for endocrinologic and cardiac etiologies has been unrevealing. Exam was notable for brisk reflexes throughout, which suggests a CNS process.     CT head 8/30/18 unremarkable  Urine metanephrines mildly elevated (352 - range 0-300); endocrinology feel labs not supportive of pheo     Assessment and Recommendations:  -- MRI brain shows no acute abnormality apart from posterior pituitary enhancement. Per radiology it is within normal limits. Pituitary panel after discussing with primary.  -- 24 hr EEG pending  -- Seizure precautions  -- Telemetry     Palpitations    -- see above     Generalized anxiety disorder    -- reports significant worsening of anxiety since symptom onset, with minimal benefit from treatment so far     Crohn's disease of small intestine    -- previously on stelara which has PRES listed as adverse reaction - this was d/c 1 year ago  -- now on Entyvio infusions         VTE Risk Mitigation (From admission, onward)        Ordered     IP VTE LOW RISK PATIENT  Once      01/11/19 1922     Place JEANNA hose  Until discontinued      01/07/19 1903     Place sequential compression device  Until discontinued      01/07/19 1903        Neurology will continue to follow. Please call with any questions  Neurology Spectra [phone]      James Mckinney MD  Neurology  [facility]
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
## 2019-01-14 NOTE — ASSESSMENT & PLAN NOTE
ASSESSMENT     Ivy Salgado is a 38 y.o. female with a past psychiatric history of depression and anxiety, who presented to the [facility] ED on 1/7/2019 due to palpitations. Repeat urinary metanephrines not suggestive of pheochromocytoma.  Neuro on board to rule out alternate etiologies.  In interim, no further episodes.      Tolerating Remeron and Klonopin well.      Continue to reinforce the bidirectional nature of anxiety and increased autonomic response.  Patient agreeable to CBT.  Will provide CBT packet to her today to introduce concepts.  She will need outpatient psychiatric follow up.  It does not appear she is eligible to follow up at my clinic, two other providers are listed in her discharge follow up instructions.      IMPRESSION  ARPITA  MDD  R/o panic disorder    RECOMMENDATION(S)     - Increase Remeron to 15 mg nightly  - Continue Klonopin 1 BID PRN anxiety.    - Minimize use of other QTc prolonging agents if possible.    - Expresses interest in outpatient psychiatric and psychology follow up.  List of resources placed in discharge instructions in chart.

## 2019-01-15 LAB
ALBUMIN SERPL BCP-MCNC: 2.9 G/DL
ALP SERPL-CCNC: 88 U/L
ALT SERPL W/O P-5'-P-CCNC: 14 U/L
ANION GAP SERPL CALC-SCNC: 7 MMOL/L
AST SERPL-CCNC: 19 U/L
BASOPHILS # BLD AUTO: 0.02 K/UL
BASOPHILS NFR BLD: 0.4 %
BILIRUB SERPL-MCNC: 0.1 MG/DL
BUN SERPL-MCNC: 6 MG/DL
CALCIUM SERPL-MCNC: 8.8 MG/DL
CHLORIDE SERPL-SCNC: 105 MMOL/L
CO2 SERPL-SCNC: 27 MMOL/L
CREAT SERPL-MCNC: 0.7 MG/DL
DIFFERENTIAL METHOD: ABNORMAL
EOSINOPHIL # BLD AUTO: 0.1 K/UL
EOSINOPHIL NFR BLD: 2.4 %
ERYTHROCYTE [DISTWIDTH] IN BLOOD BY AUTOMATED COUNT: 14.7 %
EST. GFR  (AFRICAN AMERICAN): >60 ML/MIN/1.73 M^2
EST. GFR  (NON AFRICAN AMERICAN): >60 ML/MIN/1.73 M^2
GLUCOSE SERPL-MCNC: 95 MG/DL
HCT VFR BLD AUTO: 31.3 %
HGB BLD-MCNC: 10.2 G/DL
IMM GRANULOCYTES # BLD AUTO: 0.01 K/UL
IMM GRANULOCYTES NFR BLD AUTO: 0.2 %
LYMPHOCYTES # BLD AUTO: 2.3 K/UL
LYMPHOCYTES NFR BLD: 45 %
MAGNESIUM SERPL-MCNC: 1.6 MG/DL
MCH RBC QN AUTO: 27.9 PG
MCHC RBC AUTO-ENTMCNC: 32.6 G/DL
MCV RBC AUTO: 86 FL
MONOCYTES # BLD AUTO: 0.7 K/UL
MONOCYTES NFR BLD: 14.1 %
NEUTROPHILS # BLD AUTO: 1.9 K/UL
NEUTROPHILS NFR BLD: 37.9 %
NRBC BLD-RTO: 0 /100 WBC
PHOSPHATE SERPL-MCNC: 4.2 MG/DL
PLATELET # BLD AUTO: 250 K/UL
PMV BLD AUTO: 9.9 FL
POTASSIUM SERPL-SCNC: 3.8 MMOL/L
PROT SERPL-MCNC: 6.1 G/DL
RBC # BLD AUTO: 3.65 M/UL
SODIUM SERPL-SCNC: 139 MMOL/L
WBC # BLD AUTO: 5.05 K/UL

## 2019-01-15 PROCEDURE — 80053 COMPREHEN METABOLIC PANEL: CPT

## 2019-01-15 PROCEDURE — 25000003 PHARM REV CODE 250: Performed by: HOSPITALIST

## 2019-01-15 PROCEDURE — 85025 COMPLETE CBC W/AUTO DIFF WBC: CPT

## 2019-01-15 PROCEDURE — 99232 SBSQ HOSP IP/OBS MODERATE 35: CPT | Mod: ,,, | Performed by: PSYCHIATRY & NEUROLOGY

## 2019-01-15 PROCEDURE — 99232 PR SUBSEQUENT HOSPITAL CARE,LEVL II: ICD-10-PCS | Mod: ,,, | Performed by: PSYCHIATRY & NEUROLOGY

## 2019-01-15 PROCEDURE — 36415 COLL VENOUS BLD VENIPUNCTURE: CPT

## 2019-01-15 PROCEDURE — 84100 ASSAY OF PHOSPHORUS: CPT

## 2019-01-15 PROCEDURE — 99232 PR SUBSEQUENT HOSPITAL CARE,LEVL II: ICD-10-PCS | Mod: AF,HB,, | Performed by: PSYCHIATRY & NEUROLOGY

## 2019-01-15 PROCEDURE — 11000001 HC ACUTE MED/SURG PRIVATE ROOM

## 2019-01-15 PROCEDURE — 99232 SBSQ HOSP IP/OBS MODERATE 35: CPT | Mod: ,,, | Performed by: INTERNAL MEDICINE

## 2019-01-15 PROCEDURE — 99232 SBSQ HOSP IP/OBS MODERATE 35: CPT | Mod: AF,HB,, | Performed by: PSYCHIATRY & NEUROLOGY

## 2019-01-15 PROCEDURE — 99232 PR SUBSEQUENT HOSPITAL CARE,LEVL II: ICD-10-PCS | Mod: ,,, | Performed by: INTERNAL MEDICINE

## 2019-01-15 PROCEDURE — 83735 ASSAY OF MAGNESIUM: CPT

## 2019-01-15 RX ADMIN — PROMETHAZINE HYDROCHLORIDE 25 MG: 25 TABLET ORAL at 04:01

## 2019-01-15 RX ADMIN — MAGNESIUM OXIDE TAB 400 MG (241.3 MG ELEMENTAL MG) 400 MG: 400 (241.3 MG) TAB at 08:01

## 2019-01-15 RX ADMIN — OXYCODONE HYDROCHLORIDE AND ACETAMINOPHEN 1 TABLET: 10; 325 TABLET ORAL at 04:01

## 2019-01-15 RX ADMIN — POTASSIUM CITRATE 30 MEQ: 10 TABLET ORAL at 08:01

## 2019-01-15 RX ADMIN — LOPERAMIDE HYDROCHLORIDE 2 MG: 2 CAPSULE ORAL at 09:01

## 2019-01-15 RX ADMIN — OXYCODONE HYDROCHLORIDE AND ACETAMINOPHEN 1 TABLET: 10; 325 TABLET ORAL at 09:01

## 2019-01-15 RX ADMIN — PANTOPRAZOLE SODIUM 40 MG: 40 TABLET, DELAYED RELEASE ORAL at 06:01

## 2019-01-15 RX ADMIN — PANTOPRAZOLE SODIUM 40 MG: 40 TABLET, DELAYED RELEASE ORAL at 04:01

## 2019-01-15 RX ADMIN — CLONAZEPAM 1 MG: 1 TABLET ORAL at 09:01

## 2019-01-15 RX ADMIN — LOPERAMIDE HYDROCHLORIDE 2 MG: 2 CAPSULE ORAL at 04:01

## 2019-01-15 RX ADMIN — OXYCODONE HYDROCHLORIDE AND ACETAMINOPHEN 1 TABLET: 10; 325 TABLET ORAL at 10:01

## 2019-01-15 RX ADMIN — ZOLPIDEM TARTRATE 5 MG: 5 TABLET ORAL at 10:01

## 2019-01-15 RX ADMIN — CLONAZEPAM 1 MG: 1 TABLET ORAL at 10:01

## 2019-01-15 RX ADMIN — SODIUM CHLORIDE, SODIUM LACTATE, POTASSIUM CHLORIDE, AND CALCIUM CHLORIDE: .6; .31; .03; .02 INJECTION, SOLUTION INTRAVENOUS at 02:01

## 2019-01-15 RX ADMIN — DIPHENOXYLATE HYDROCHLORIDE AND ATROPINE SULFATE 1 TABLET: 2.5; .025 TABLET ORAL at 04:01

## 2019-01-15 RX ADMIN — SODIUM CHLORIDE, SODIUM LACTATE, POTASSIUM CHLORIDE, AND CALCIUM CHLORIDE: .6; .31; .03; .02 INJECTION, SOLUTION INTRAVENOUS at 04:01

## 2019-01-15 RX ADMIN — MIRTAZAPINE 15 MG: 15 TABLET, FILM COATED ORAL at 09:01

## 2019-01-15 RX ADMIN — PROMETHAZINE HYDROCHLORIDE 25 MG: 25 TABLET ORAL at 09:01

## 2019-01-15 RX ADMIN — DIPHENOXYLATE HYDROCHLORIDE AND ATROPINE SULFATE 1 TABLET: 2.5; .025 TABLET ORAL at 08:01

## 2019-01-15 RX ADMIN — POTASSIUM CITRATE 30 MEQ: 10 TABLET ORAL at 09:01

## 2019-01-15 RX ADMIN — PROMETHAZINE HYDROCHLORIDE 25 MG: 25 TABLET ORAL at 10:01

## 2019-01-15 RX ADMIN — LOPERAMIDE HYDROCHLORIDE 2 MG: 2 CAPSULE ORAL at 02:01

## 2019-01-15 RX ADMIN — DIPHENOXYLATE HYDROCHLORIDE AND ATROPINE SULFATE 1 TABLET: 2.5; .025 TABLET ORAL at 12:01

## 2019-01-15 RX ADMIN — LOPERAMIDE HYDROCHLORIDE 2 MG: 2 CAPSULE ORAL at 08:01

## 2019-01-15 NOTE — PROGRESS NOTES
Progress Note  Hospital Medicine    Admit Date: 1/7/2019  Length of Stay:  LOS: 8 days     SUBJECTIVE:         Follow-up For:  Sinus tachycardia    HPI/Interval history (See H&P for complete P,F,SHx) :      urine cultures - klebsiella sensitive to ceftriaxone switched to augmentin (day 2/3) for a complicated UTI. s/p  psychiatry consulted for possible panic attacks/ depression - PRN Klonopin 0.5mg--> 1mg  BID prn symptomatic relief. started on remeron   . improving  ostomy output - started on loperamide QID and lomotil TID- stool for Clost diff assay negative. No evidence of pheochromocytoma per Endo. Neurology consulted for possible seizures - MRI brain - No acute intracranial abnormality to explain the patient's provided clinical history of seizure.  EEG pending.   Review of Systems: List   Review of Systems   Constitutional: Positive for diaphoresis and unexpected weight change (7lb weight loss). Negative for activity change, appetite change, chills, fatigue and fever.   Eyes: Negative for photophobia and visual disturbance.   Respiratory: Negative for apnea, cough, choking, shortness of breath, wheezing and stridor.    Cardiovascular: Positive for palpitations.(resolved)  Negative for chest pain and leg swelling.   Gastrointestinal: Negative for abdominal pain, blood in stool, constipation, diarrhea, nausea and vomiting.   Endocrine: Negative for cold intolerance and heat intolerance.   Genitourinary: Positive for frequency. Negative for difficulty urinating.   Musculoskeletal: Negative for arthralgias and myalgias.   Skin: Negative for rash and wound.   Allergic/Immunologic: Negative for immunocompromised state.   Neurological: Negative for dizziness, weakness and headaches.   Psychiatric/Behavioral: Negative for agitation, behavioral problems and confusion.       OBJECTIVE:     Vital Signs Range (Last 24H):  Temp:  [98 °F (36.7 °C)-98.9 °F (37.2 °C)]   Pulse:  [69-89]   Resp:  [11-19]   BP: (104-133)/(69-88)    SpO2:  [97 %-100 %]     Physical Exam:  Physical Exam   Constitutional: She is oriented to person, place, and time. She appears well-developed and well-nourished. No distress.   Thin, tremulous   HENT:   Head: Normocephalic and atraumatic.   Mouth/Throat: No oropharyngeal exudate.   Eyes: Conjunctivae and EOM are normal. Pupils are equal, round, and reactive to light. No scleral icterus.   Neck: Normal range of motion. Neck supple. No tracheal deviation present. No thyromegaly present.   Cardiovascular: Regular rhythm, normal heart sounds and intact distal pulses. Tachycardia present.   No murmur heard.  Pulmonary/Chest: Effort normal and breath sounds normal. No respiratory distress. She has no wheezes. She has no rales. She exhibits no tenderness.   Abdominal: Soft. Bowel sounds are normal. She exhibits no distension. There is no tenderness. There is no rebound and no guarding.   +ve Ileostomy   Musculoskeletal: Normal range of motion. She exhibits no edema or tenderness.   Lymphadenopathy:     She has no cervical adenopathy.   Neurological: She is alert and oriented to person, place, and time.   Skin: Skin is warm and dry. No rash noted. She is not diaphoretic. No erythema. No pallor.   Psychiatric: She has a normal mood and affect. Her behavior is normal. Judgment and thought content normal.        CN III, IV, VI   Pupils are equal, round, and reactive to light.  Extraocular motions are normal.                Medications:  Medication list was reviewed and changes noted under Assessment/Plan.      Current Facility-Administered Medications:     acetaminophen tablet 650 mg, 650 mg, Oral, Q8H PRN, Charlette Santana MD, 650 mg at 01/10/19 1815    clonazePAM tablet 1 mg, 1 mg, Oral, BID PRN, Se Kolb MD, 1 mg at 01/15/19 0955    diphenoxylate-atropine 2.5-0.025 mg per tablet 1 tablet, 1 tablet, Oral, TID WM, Se Kolb MD, 1 tablet at 01/15/19 1216    lactated ringers infusion, ,  Intravenous, Continuous, Se Kolb MD, Last Rate: 100 mL/hr at 01/15/19 1426    loperamide capsule 2 mg, 2 mg, Oral, QID, Se Kolb MD, 2 mg at 01/15/19 1426    magnesium oxide tablet 400 mg, 400 mg, Oral, Daily, Charlette Santana MD, 400 mg at 01/15/19 0859    mirtazapine tablet 15 mg, 15 mg, Oral, QHS, Se Kolb MD, 15 mg at 01/14/19 2145    oxyCODONE-acetaminophen  mg per tablet 1 tablet, 1 tablet, Oral, Q6H PRN, Charlette Santana MD, 1 tablet at 01/15/19 0954    pantoprazole EC tablet 40 mg, 40 mg, Oral, BID AC, Se Kolb MD, 40 mg at 01/15/19 0644    potassium citrate SR tablet 30 mEq, 30 mEq, Oral, BID, Charlette Santana MD, 30 mEq at 01/15/19 0859    promethazine tablet 25 mg, 25 mg, Oral, Q6H PRN, Charlette Santana MD, 25 mg at 01/15/19 0955    sodium chloride 0.9% flush 5 mL, 5 mL, Intravenous, PRN, Lubna Florez PA-C    sodium chloride 0.9% flush 5 mL, 5 mL, Intravenous, PRN, Charlette Santana MD    zolpidem tablet 5 mg, 5 mg, Oral, Nightly PRN, Charlette Santana MD, 5 mg at 01/14/19 2210    acetaminophen, clonazePAM, oxyCODONE-acetaminophen, promethazine, sodium chloride 0.9%, sodium chloride 0.9%, zolpidem    Laboratory/Diagnostic Data:  Reviewed and noted in plan where applicable- Please see chart for full lab data.    Recent Labs   Lab 01/13/19  0622 01/14/19  0554 01/15/19  0545   WBC 4.96 4.88 5.05   HGB 10.6* 10.3* 10.2*   HCT 33.2* 31.9* 31.3*    254 250       Recent Labs   Lab 01/13/19  0622 01/14/19  0554 01/15/19  0545    139 139   K 3.9 4.0 3.8    106 105   CO2 26 27 27   BUN 9 6 6   CREATININE 0.6 0.7 0.7   GLU 87 92 95   CALCIUM 9.0 9.1 8.8   MG 1.8 1.7 1.6   PHOS 3.9 3.9 4.2       Recent Labs   Lab 01/13/19 0622 01/14/19  0554 01/15/19  0545   ALKPHOS 78 81 88   ALT 9* 12 14   AST 13 17 19   ALBUMIN 3.0* 3.0* 2.9*   PROT 6.2 6.1 6.1   BILITOT 0.2 0.2 0.1         Microbiology labs for the last week  Microbiology Results (last 7 days)     Procedure Component Value Units Date/Time    Stool culture [[id number]] Collected:  01/09/19 1730    Order Status:  Completed Specimen:  Stool Updated:  01/14/19 1529     Stool Culture No Salmonella,Shigella,Vibrio,Campylobacter,Yersinia isolated.    Blood Culture #2 **CANNOT BE ORDERED STAT** [[id number]] Collected:  01/07/19 1717    Order Status:  Completed Specimen:  Blood from Peripheral, Wrist, Left Updated:  01/12/19 1812     Blood Culture, Routine No growth after 5 days.    Blood Culture #1 **CANNOT BE ORDERED STAT** [[id number]] Collected:  01/07/19 1712    Order Status:  Completed Specimen:  Blood from Peripheral, Forearm, Left Updated:  01/12/19 1812     Blood Culture, Routine No growth after 5 days.    Urine culture [[id number]]  (Susceptibility) Collected:  01/07/19 1723    Order Status:  Completed Specimen:  Urine Updated:  01/10/19 1549     Urine Culture, Routine --     KLEBSIELLA PNEUMONIAE  >100,000 cfu/ml      Narrative:       add on test urine toxicology per dr nohemy marks order #[id number]   01/07/2019  22:58     Clostridium difficile EIA [[id number]] Collected:  01/09/19 1730    Order Status:  Completed Specimen:  Stool Updated:  01/10/19 0531     C. diff Antigen Negative     C difficile Toxins A+B, EIA Negative     Comment: Testing not recommended for children <24 months old.       E. coli 0157 antigen [[id number]] Collected:  01/09/19 1730    Order Status:  Canceled Specimen:  Stool Updated:  01/09/19 2340           Imaging Results          X-Ray Chest PA And Lateral (Final result)  Result time 01/07/19 17:34:50    Final result by Suzi Martinez MD (01/07/19 17:34:50)                 Impression:      No acute cardiopulmonary abnormality.      Electronically signed by: Suzi Martinez  Date:    01/07/2019  Time:    17:34             Narrative:    EXAMINATION:  XR CHEST PA AND LATERAL    CLINICAL HISTORY:  Fever,  "unspecified    TECHNIQUE:  PA and lateral views of the chest were performed.    COMPARISON:  Multiple priors, most recent 12/11/2018    FINDINGS:  Stable positioning of a left chest wall port catheter.The lungs are clear.  No focal consolidation, pleural effusion or pneumothorax.    Normal cardiomediastinal contour.    No acute or aggressive osseous abnormality. Scoliosis.                                Estimated body mass index is 17.57 kg/m² as calculated from the following:    Height as of this encounter: 5' 1" (1.549 m).    Weight as of this encounter: 42.2 kg (93 lb).    I & O (Last 24H):    Intake/Output Summary (Last 24 hours) at 1/15/2019 1639  Last data filed at 1/15/2019 0408  Gross per 24 hour   Intake 1380 ml   Output 2350 ml   Net -970 ml       Estimated Creatinine Clearance: 74 mL/min (based on SCr of 0.7 mg/dL).    ASSESSMENT/PLAN:     Active Problems:      Active Hospital Problems    Diagnosis  POA    *Sinus tachycardia [R00.0]Multiple episodes of flushing, tachycardia, diaphoresis, anxiety over the course of the year. Being worked up by endocrinology for pheo vs carcinoid syndrome.Dr. Landry  recommended patient commence 24h urine metanephrine collection and follow up in clinic.   - Previous metanephrines, catecholamines, 5HIAA, chromogranin A levels unremarkable. 24hr urine metanephrines slightly elevated. not taking metoprolol as per endocrine advice   Catecholamine levels mildly above normal. endocrine consulted 1/12  FT4,- normal   Hemodynamically stable.   No evidence of pheochromocytoma per Endo. Neurology consulted for possible seizures - MRI brain - No acute intracranial abnormality to explain the patient's provided clinical history of seizure. 24hr EEG pending        Generalized anxiety disorder [F41.1] r/ o Panic attack - on Xanax PRN -   Had an episode of flushing with tachycardia  117 with SBP 150s this AM Telemetry with sinus tachycardia. c/o dysuria and left flank resolved now. urine " cultures -gram negative rods. tearful and admits to depression psychiatry consulted for possible panic attacks/ depression . admits stressors at home. previosly on celexa but discontinued dut prolonged QT. no psyc follow up since  1year    s/p psych eval on 1/10/19 - xanax discontinued    s/p  psychiatry consulted for possible panic attacks/ depression - PRN Klonopin 0.5mg--> 1mg  BID prn symptomatic relief. started on remeron   . i      Weight loss - 17 lb in 1 year. Nutrition consulted.started on remeron . Marinol may actually be worsening anxiety, nausea, vasodilation at this point and hasn't been helpful for appetite stimulation. Marinol discontinued      Diarrhea/ increased ostomy output /orthostasis -  .    Improving . started on RL hydration - 100ml /hr for orthostais - for increased ostomy output - started on loperamide QID and lomotil TID- stool for Clost diff assay negative. GI and ID Consulted     Borderline BP - . RL Bolus 500ml x once for boderline SBP 89. obtain orthostasis     Yes    Hypomagnesemia [E83.42]replaced   Unknown    Anemia [D64.9]Hb 10.2 normocytis. stable. monitor   Unknown    Urinary tract infection with hematuria [N39.0, R31.9]/ pyelonerphritis -  urine cultures - klebsiella sensitive to ceftriaxone switched to augmentin (day 3/3) for a complicated UTI.Follows w/ Urology OP. 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 on Mirtazapine - dose increased to 15mg nightly  Yes    Crohn's disease of small intestine [K50.00]    Chronic, stable, s/p ileostomy. Output about the same.   Continue lomotil/loperamide prn.   Continue Oxy IR for pain relief, prn. on  nae      Yes     Chronic      Yes      Resolved Hospital Problems   No resolved problems to display.         Disposition- home    DVT prophylaxis addressed with:MARIA EUGENIA Anglin Staff Physician  [facility] Medicine

## 2019-01-15 NOTE — PLAN OF CARE
Problem: Adult Inpatient Plan of Care  Goal: Plan of Care Review  Outcome: Ongoing (interventions implemented as appropriate)  Pt is free from falls trauma and injuries. Bed in low position with call light in reach. Skin is warm and dry. VS are stable. No noted fever or pain.

## 2019-01-15 NOTE — MEDICAL/APP STUDENT
Discharge Summary  Hospital Medicine    Attending Provider on Discharge: [facility] Medicine Team: [facility] MED B  Date of Admission:  1/7/2019     Date of Discharge:    Length of Stay:  LOS: 8 days   Code status: Full Code    Active Hospital Problems    Diagnosis  POA    *Sinus tachycardia [R00.0]  Yes    Transient neurological symptoms [R29.818]  Yes     Episodes of full body tremors, palpitations, tachycardia, HTN, flushing with associated alterations of awareness  2min to >20min      Moderate malnutrition [E44.0]  Yes    Diarrhea [R19.7]  Yes    Weight loss [R63.4]  Yes    Hypomagnesemia [E83.42]  Yes    Anemia [D64.9]  Yes    Urinary tract infection with hematuria [N39.0, R31.9]  Yes    Hypokalemia [E87.6]  Yes    Palpitations [R00.2]  Yes    Appetite impaired [R63.0]  Yes    Crohn's disease of small intestine [K50.00]  Yes     Chronic    Generalized anxiety disorder [F41.1]  Yes      Resolved Hospital Problems   No resolved problems to display.        HPI   Ms. Salgado is a 39 yo F with HTN, Crohn's disease (s/p total colectomy w/ end ileostomy (2012)) on Entyvio infusions, recurrent UTIs and osteopenia who presents to the ED with a chief complaint of palpitations. She reports abrupt onset of palpitations associated with tachycardia, facial flushing, and tremulousness 20 minutes prior to arrival. She was eating lunch with her daughter and mother when the symptoms started. She attempted treatment with xanax with minimal improvement. Symptoms were still present at time of evaluation. She has a prior hx of similar episodes for over a year now, which have been attributed to dehydration (2/2 malabsorption) and anxiety. Patient also notes weight loss, overall not feeling well today. She denies fever, chills, URI symptoms, nausea/vomiting, abdominal pain, changes in urination or stool output. She denies any precipitating stressors. She has been worked up for this recently by Endocrinology and  Cardiology, with plasma catecholamines/metanephrines, as well as event recorders (9/18-->20 symptomatic transmissions all c/w sinus tachycardia; 4/18 also revealed sinus rhythm and sinus tachycardia). She is in the process of establishing with neuroendocrine for evaluation for carcinoid.      In the ED, an EKG showed sinus tachycardia, similar to previous episodes. A CXR was -ve for acute processes. Her CBC/CMP were notable only for mild hypokalemia. A UA was +ve for bacteria, nitrites. She denied dysuria, but endorsed frequency.      Upon chart review, patient was most recently treated with Augmentin, Bactrim and Keflex in December for UTI. Cultures have previously grown: 12/04 Klebsiella (R to ampicillin), 11/21 Klebsiella (R to ampicillin), 9/18 Enterococcus (R to tetracycline), 4/12 Enterobacter (pan sensitive), 2/02 ESBL (R to cephlosporins, FQs, ampicillin).     Hospital Course   Ms. Salgado is a 39 yo F with HTN, Crohn's disease (s/p total colectomy w/ end ileostomy (2012)) on Entyvio infusions, recurrent UTIs and osteopenia who presents to the ED with a chief complaint of palpitations. Telemetry with NSR. Psychiatry consulted for panic attacks/depression. Klebsiella found in urine cx. Patient started on PRN klonopin and augmentin. Neurology consulted for possible seizure, MRI brain with no acute abnormality. EEG read and ok. Patient discharged home.     Recent Labs   Lab 01/13/19  0622 01/14/19  0554 01/15/19  0545   WBC 4.96 4.88 5.05   HGB 10.6* 10.3* 10.2*   HCT 33.2* 31.9* 31.3*    254 250     Recent Labs   Lab 01/13/19 0622 01/14/19  0554 01/15/19  0545    139 139   K 3.9 4.0 3.8    106 105   CO2 26 27 27   BUN 9 6 6   CREATININE 0.6 0.7 0.7   GLU 87 92 95   CALCIUM 9.0 9.1 8.8   MG 1.8 1.7 1.6   PHOS 3.9 3.9 4.2     Recent Labs   Lab 01/13/19 0622 01/14/19  0554 01/15/19  0545   ALKPHOS 78 81 88   ALT 9* 12 14   AST 13 17 19   ALBUMIN 3.0* 3.0* 2.9*   PROT 6.2 6.1 6.1   BILITOT 0.2  0.2 0.1      No results for input(s): CPK, CPKMB, MB, TROPONINI in the last 72 hours.  No results for input(s): LACTATE in the last 72 hours.    No results for input(s): POCTGLUCOSE in the last 168 hours.   Hemoglobin A1C   Date Value Ref Range Status   09/18/2018 4.8 4.0 - 5.6 % Final     Comment:     ADA Screening Guidelines:  5.7-6.4%  Consistent with prediabetes  >or=6.5%  Consistent with diabetes  High levels of fetal hemoglobin interfere with the HbA1C  assay. Heterozygous hemoglobin variants (HbS, HgC, etc)do  not significantly interfere with this assay.   However, presence of multiple variants may affect accuracy.     08/30/2018 4.8 4.0 - 5.6 % Final     Comment:     ADA Screening Guidelines:  5.7-6.4%  Consistent with prediabetes  >or=6.5%  Consistent with diabetes  High levels of fetal hemoglobin interfere with the HbA1C  assay. Heterozygous hemoglobin variants (HbS, HgC, etc)do  not significantly interfere with this assay.   However, presence of multiple variants may affect accuracy.     02/06/2018 4.6 4.0 - 5.6 % Final     Comment:     According to ADA guidelines, hemoglobin A1c <7.0% represents  optimal control in non-pregnant diabetic patients. Different  metrics may apply to specific patient populations.   Standards of Medical Care in Diabetes-2016.  For the purpose of screening for the presence of diabetes:  <5.7%     Consistent with the absence of diabetes  5.7-6.4%  Consistent with increasing risk for diabetes   (prediabetes)  >or=6.5%  Consistent with diabetes  Currently, no consensus exists for use of hemoglobin A1c  for diagnosis of diabetes for children.  This Hemoglobin A1c assay has significant interference with fetal   hemoglobin   (HbF). The results are invalid for patients with abnormal amounts of   HbF,   including those with known Hereditary Persistence   of Fetal Hemoglobin. Heterozygous hemoglobin variants (HbAS, HbAC,   HbAD, HbAE, HbA2) do not significantly interfere with this assay;    however, presence of multiple variants in a sample may impact the %   interference.         Procedures: EEG    Consultants: Neurology: Seizures, Nutrition: diet, Psychiatry: panic attacks/depression, Gastroenterology: Crohns disease of small intestine, Infectious disease: Urinary tract infection with hematuria, Endocrinology: sinus tachycardia    Current Discharge Medication List      CONTINUE these medications which have NOT CHANGED    Details   ALPRAZolam (XANAX) 1 MG tablet Take 1 tablet (1 mg total) by mouth 2 (two) times daily. as needed for anxiety.  Qty: 60 tablet, Refills: 0    Associated Diagnoses: Anxiety      diphenoxylate-atropine 2.5-0.025 mg (LOMOTIL) 2.5-0.025 mg per tablet Take 1 tablet by mouth 4 (four) times daily as needed for Diarrhea.  Qty: 100 tablet, Refills: 0    Comments: Not to exceed 5 additional fills before 05/12/2018  Associated Diagnoses: Crohn's disease of small and large intestines with complication      dronabinol (MARINOL) 5 MG capsule Take 1 capsule (5 mg total) by mouth 2 (two) times daily before meals.  Qty: 60 capsule, Refills: 1    Comments: This request is for a new prescription for a controlled substance as required by Federal/State law.  Associated Diagnoses: Crohn's disease of both small and large intestine with complication; Encounter for long-term (current) use of high-risk medication      loperamide (IMODIUM) 2 mg capsule Take 2 mg by mouth daily as needed for Diarrhea.      magnesium 250 mg Tab Take by mouth once.      multivitamin (ONE DAILY MULTIVITAMIN) per tablet Take 1 tablet by mouth once daily.      potassium citrate 15 mEq TbSR Take 2 tablets by mouth 2 (two) times daily.  Qty: 360 tablet, Refills: 3      promethazine (PHENERGAN) 25 MG tablet Take 1 tablet (25 mg total) by mouth every 6 (six) hours as needed.  Qty: 30 tablet, Refills: 3      valACYclovir (VALTREX) 500 MG tablet TK 1 T PO QD  Refills: 2      clindamycin (CLINDESSE) 2 % vaginal cream PLACE  VAGINALLY EVERY EVENING  Qty: 40 g, Refills: 0      flu vac [id number] 36mos up,PF, 60 mcg (15 mcg x 4)/0.5 mL Syrg inject into the muscle  Qty: 0.5 mL, Refills: 0      oxyCODONE-acetaminophen (PERCOCET)  mg per tablet Take 1 tablet by mouth every 6 (six) hours as needed for Pain.  Qty: 40 tablet, Refills: 0    Associated Diagnoses: Crohn's disease of small and large intestines with complication      terconazole (TERAZOL 7) 0.4 % Crea INSERT ONE APPLICATORFUL VAGINALLY AT BEDTIME  Qty: 45 g, Refills: 0      zolpidem (AMBIEN) 10 mg Tab TAKE 1 TABLET BY MOUTH EVERY EVENING  Qty: 30 tablet, Refills: 0    Associated Diagnoses: Anxiety         STOP taking these medications       butalbital-acetaminophen-caffeine -40 mg (FIORICET, ESGIC) -40 mg per tablet Comments:   Reason for Stopping:               Discharge Diet:regular diet with Normal Fluid intake of 1500 - 2000 mL per day    Activity: activity as tolerated    Discharge Condition: Good    Disposition: Home or Self Care    Tests pending at the time of discharge: none      Time spent  on the discharge of the patient including review of hospital course with the patient. reviewing discharge medications and arranging follow-up care     Discharge examination Patient was seen and examined on the date of discharge and determined to be suitable for discharge.    Discharge plan     Future Appointments   Date Time Provider Department Center   1/17/2019 10:00 AM CHAIR 05 [facility]MO [facility]   1/23/2019  2:30 PM Parish Ross MD [facility] GASTRO [address]   2/14/2019  2:00 PM [facility] [id number] LIMIT [facility] XRAY IM [address]   2/28/2019 10:00 AM CHAIR 02 [facility]MO [facility]   3/13/2019  9:15 AM Phu Obrien MD [facility] UROLOGY [facility]     I personally scribed for Ant Bourgeois MD on 01/15/2019 at 10:34 AM. Electronically signed by justa Mcintyre III on 01/15/2019 at 10:34 AM
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
## 2019-01-15 NOTE — SUBJECTIVE & OBJECTIVE
Subjective:     Interval History: NAEON. Vitals stable. No further episodes since 1/10/19    Current Neurological Medications: Remeron    Current Facility-Administered Medications   Medication Dose Route Frequency Provider Last Rate Last Dose    acetaminophen tablet 650 mg  650 mg Oral Q8H PRN Charlette Santana MD   650 mg at 01/10/19 1815    clonazePAM tablet 1 mg  1 mg Oral BID PRN Se Kolb MD   1 mg at 01/15/19 0955    diphenoxylate-atropine 2.5-0.025 mg per tablet 1 tablet  1 tablet Oral TID WM Se Kolb MD   1 tablet at 01/15/19 1216    lactated ringers infusion   Intravenous Continuous Se Kolb  mL/hr at 01/15/19 0408      loperamide capsule 2 mg  2 mg Oral QID Se Kolb MD   2 mg at 01/15/19 0859    magnesium oxide tablet 400 mg  400 mg Oral Daily Charlette Santana MD   400 mg at 01/15/19 0859    mirtazapine tablet 15 mg  15 mg Oral QHS Se Kolb MD   15 mg at 01/14/19 2145    oxyCODONE-acetaminophen  mg per tablet 1 tablet  1 tablet Oral Q6H PRN Charlette Santana MD   1 tablet at 01/15/19 0954    pantoprazole EC tablet 40 mg  40 mg Oral BID AC Se Kolb MD   40 mg at 01/15/19 0644    potassium citrate SR tablet 30 mEq  30 mEq Oral BID Charlette Santana MD   30 mEq at 01/15/19 0859    promethazine tablet 25 mg  25 mg Oral Q6H PRN Charlette Santana MD   25 mg at 01/15/19 0955    sodium chloride 0.9% flush 5 mL  5 mL Intravenous PRN Lubna Florez PA-C        sodium chloride 0.9% flush 5 mL  5 mL Intravenous PRN Charlette Santana MD        zolpidem tablet 5 mg  5 mg Oral Nightly PRN Charlette Santana MD   5 mg at 01/14/19 2210       Review of Systems   Constitutional: Negative for fever.   HENT: Negative for trouble swallowing.    Eyes: Negative for visual disturbance.   Respiratory: Negative for shortness of breath.    Cardiovascular: Positive for palpitations. Negative  for chest pain.   Neurological: Negative for tremors, seizures, syncope, speech difficulty and headaches.   Psychiatric/Behavioral: Negative for agitation and confusion.     Objective:     Vital Signs (Most Recent):  Temp: 98.3 °F (36.8 °C) (01/15/19 1156)  Pulse: 89 (01/15/19 1156)  Resp: 12 (01/15/19 1156)  BP: 133/88 (01/15/19 1156)  SpO2: 97 % (01/15/19 1156) Vital Signs (24h Range):  Temp:  [98 °F (36.7 °C)-98.4 °F (36.9 °C)] 98.3 °F (36.8 °C)  Pulse:  [69-89] 89  Resp:  [11-19] 12  SpO2:  [97 %-100 %] 97 %  BP: (110-133)/(69-88) 133/88     Weight: 42.2 kg (93 lb)  Body mass index is 17.57 kg/m².    Physical Exam   Constitutional: She is oriented to person, place, and time. No distress.   Eyes: EOM are normal. Pupils are equal, round, and reactive to light.   Neurological: She is alert and oriented to person, place, and time.   Reflex Scores:       Bicep reflexes are 2+ on the right side and 2+ on the left side.       Brachioradialis reflexes are 2+ on the right side and 2+ on the left side.       Patellar reflexes are 2+ on the right side and 2+ on the left side.       Achilles reflexes are 2+ on the right side and 2+ on the left side.  Psychiatric: Her speech is normal.   Nursing note and vitals reviewed.      NEUROLOGICAL EXAMINATION:     MENTAL STATUS   Oriented to person, place, and time.   Oriented to person.   Oriented to place.   Oriented to time.   Speech: speech is normal   Level of consciousness: alert    CRANIAL NERVES     CN III, IV, VI   Pupils are equal, round, and reactive to light.  Extraocular motions are normal.     CN V   Facial sensation intact.     CN VII   Facial expression full, symmetric.     CN XI   CN XI normal.     CN XII   CN XII normal.     MOTOR EXAM        Strength 5/5 in all four extremities.     REFLEXES     Reflexes   Right brachioradialis: 2+  Left brachioradialis: 2+  Right biceps: 2+  Left biceps: 2+  Right patellar: 2+  Left patellar: 2+  Right achilles: 2+  Left achilles:  2+    GAIT AND COORDINATION     Tremor   Resting tremor: absent      Significant Labs:   Recent Lab Results       01/15/19  0545        Immature Granulocytes 0.2     Immature Grans (Abs) 0.01  Comment:  Mild elevation in immature granulocytes is non specific and   can be seen in a variety of conditions including stress response,   acute inflammation, trauma and pregnancy. Correlation with other   laboratory and clinical findings is essential.       Albumin 2.9     Alkaline Phosphatase 88     ALT 14     Anion Gap 7     AST 19     Baso # 0.02     Basophil% 0.4     Total Bilirubin 0.1  Comment:  For infants and newborns, interpretation of results should be based  on gestational age, weight and in agreement with clinical  observations.  Premature Infant recommended reference ranges:  Up to 24 hours.............<8.0 mg/dL  Up to 48 hours............<12.0 mg/dL  3-5 days..................<15.0 mg/dL  6-29 days.................<15.0 mg/dL       BUN, Bld 6     Calcium 8.8     Chloride 105     CO2 27     Creatinine 0.7     Differential Method Automated     eGFR if African American >60.0     eGFR if non  >60.0  Comment:  Calculation used to obtain the estimated glomerular filtration  rate (eGFR) is the CKD-EPI equation.        Eos # 0.1     Eosinophil% 2.4     Glucose 95     Gran # (ANC) 1.9     Gran% 37.9     Hematocrit 31.3     Hemoglobin 10.2     Lymph # 2.3     Lymph% 45.0     Magnesium 1.6     MCH 27.9     MCHC 32.6     MCV 86     Mono # 0.7     Mono% 14.1     MPV 9.9     nRBC 0     Phosphorus 4.2     Platelets 250     Potassium 3.8     Total Protein 6.1     RBC 3.65     RDW 14.7     Sodium 139     WBC 5.05           Significant Imaging: I have reviewed all pertinent imaging results/findings within the past 24 hours.

## 2019-01-15 NOTE — SUBJECTIVE & OBJECTIVE
"Interval History: Patient without complaints today.  Reports her mood is "not bad."  Feeling reassured by mostly negative workup to date.  Overall anxiety slightly better.  Tolerating Klonopin and Remeron without side effects.  Not feeling much benefit for sleep with Remeron yet, still requiring Ambien.  Hopefully this will improve as patient returns home in more regular environment.  Denies suicidal ideation, homicidal ideation, alli, psychosis.     Family History     Problem Relation (Age of Onset)    Cancer Father, Maternal Grandfather    Colon cancer Father    Crohn's disease Brother    Endometrial cancer Maternal Aunt    Hypertension Mother    Skin cancer Maternal Grandfather        Tobacco Use    Smoking status: Never Smoker    Smokeless tobacco: Never Used   Substance and Sexual Activity    Alcohol use: Yes     Alcohol/week: 0.0 oz     Comment: Patient only drinks wine not regular basis.    Drug use: No    Sexual activity: Yes     Partners: Male     Birth control/protection: Pill, Surgical, Condom     Comment: HYST     Psychotherapeutics (From admission, onward)    Start     Stop Route Frequency Ordered    01/14/19 2100  mirtazapine tablet 15 mg      -- Oral Nightly 01/14/19 1033    01/07/19 2217  zolpidem tablet 5 mg      -- Oral Nightly PRN 01/07/19 2117           Review of Systems  Objective:     Vital Signs (Most Recent):  Temp: 98 °F (36.7 °C) (01/14/19 2000)  Pulse: 69 (01/15/19 0742)  Resp: 12 (01/15/19 0707)  BP: 110/74 (01/15/19 0707)  SpO2: 98 % (01/15/19 0707) Vital Signs (24h Range):  Temp:  [98 °F (36.7 °C)] 98 °F (36.7 °C)  Pulse:  [69-89] 69  Resp:  [11-19] 12  SpO2:  [97 %-100 %] 98 %  BP: (110-118)/(69-80) 110/74     Height: 5' 1" (154.9 cm)  Weight: 42.2 kg (93 lb)  Body mass index is 17.57 kg/m².      Intake/Output Summary (Last 24 hours) at 1/15/2019 1021  Last data filed at 1/15/2019 0408  Gross per 24 hour   Intake 1860 ml   Output 3340 ml   Net -1480 ml       Physical Exam    " "     Appearance: thin female wearing casual clothing in NAD  Behavior:  calm, cooperative  Speech:  normal rate, tone, and volume  Mood: "note bad"  Affect: appropriate, full  Thought Process:  linear  Thought Perceptions:  denied AVH  Thought Content:  denied SI, HI; no delusions apparent  Sensorium:  awake, alert  Attention/Concentration:  intact to conversation  Orientation:  person, place, time, and situation  Memory:  intact (recent, remote)  Abstraction:  intact   Insight:  fair  Judgment:  good         Significant Labs:   Last 24 Hours:   Recent Lab Results       01/15/19  0545        Immature Granulocytes 0.2     Immature Grans (Abs) 0.01  Comment:  Mild elevation in immature granulocytes is non specific and   can be seen in a variety of conditions including stress response,   acute inflammation, trauma and pregnancy. Correlation with other   laboratory and clinical findings is essential.       Albumin 2.9     Alkaline Phosphatase 88     ALT 14     Anion Gap 7     AST 19     Baso # 0.02     Basophil% 0.4     Total Bilirubin 0.1  Comment:  For infants and newborns, interpretation of results should be based  on gestational age, weight and in agreement with clinical  observations.  Premature Infant recommended reference ranges:  Up to 24 hours.............<8.0 mg/dL  Up to 48 hours............<12.0 mg/dL  3-5 days..................<15.0 mg/dL  6-29 days.................<15.0 mg/dL       BUN, Bld 6     Calcium 8.8     Chloride 105     CO2 27     Creatinine 0.7     Differential Method Automated     eGFR if African American >60.0     eGFR if non  >60.0  Comment:  Calculation used to obtain the estimated glomerular filtration  rate (eGFR) is the CKD-EPI equation.        Eos # 0.1     Eosinophil% 2.4     Glucose 95     Gran # (ANC) 1.9     Gran% 37.9     Hematocrit 31.3     Hemoglobin 10.2     Lymph # 2.3     Lymph% 45.0     Magnesium 1.6     MCH 27.9     MCHC 32.6     MCV 86     Mono # 0.7     Mono% " 14.1     MPV 9.9     nRBC 0     Phosphorus 4.2     Platelets 250     Potassium 3.8     Total Protein 6.1     RBC 3.65     RDW 14.7     Sodium 139     WBC 5.05           Significant Imaging: None

## 2019-01-15 NOTE — ASSESSMENT & PLAN NOTE
"ASSESSMENT     Ivy Salgado is a 38 y.o. female with a past psychiatric history of depression and anxiety, [place] [facility] [place] due to palpitations. Repeat urinary metanephrines not suggestive of pheochromocytoma.  Neuro on board to rule out alternate etiologies.  In interim, no further episodes.      Tolerating Remeron and Klonopin well.      Continued to reinforce the bidirectional nature of anxiety and increased autonomic response.  Patient agreeable to CBT, was given "Mood Juice" packet at bedside yesterday.     IMPRESSION  ARPITA  MDD  R/o panic disorder    RECOMMENDATION(S)     - Continue Remeron to 15 mg nightly  - Continue Klonopin 1 BID PRN anxiety.    - Minimize use of other QTc prolonging agents if possible.    - Expresses interest in outpatient psychiatric and psychology follow up.  List of resources placed in discharge instructions in chart.     "

## 2019-01-15 NOTE — ASSESSMENT & PLAN NOTE
Episodes are concerning for complex partial seizures. Workup for endocrinologic and cardiac etiologies has been unrevealing. Exam was notable for brisk reflexes throughout, which suggests a CNS process.     CT head 8/30/18 unremarkable  Urine metanephrines mildly elevated (352 - range 0-300); endocrinology feel labs not supportive of pheo     Assessment and Recommendations:  -- MRI brain shows no acute abnormality apart from posterior pituitary enhancement. Per radiology, it is borderline abnormal. Pituitary panel and outpatient follow up with Endocrinology.  -- Routine EEG pending.   -- CPS is low on the differential but given that she has episodes of unawareness, concern still exists. Will start on AED  after obtaining EEG.  -- Seizure precautions  -- Telemetry

## 2019-01-15 NOTE — PROGRESS NOTES
[facility]  Neurology  Progress Note    Patient Name: Ivy Salgado  MRN: [id number]  Admission Date: 1/7/2019  Hospital Length of Stay: 8 days  Code Status: Full Code   Attending Provider: Ant Bourgeois MD  Primary Care Physician: Kalia Astorga MD   Principal Problem:Sinus tachycardia      Subjective:     Interval History: NAEON. Vitals stable. No further episodes since 1/10/19    Current Neurological Medications: Remeron    Current Facility-Administered Medications   Medication Dose Route Frequency Provider Last Rate Last Dose    acetaminophen tablet 650 mg  650 mg Oral Q8H PRN Charlette Santana MD   650 mg at 01/10/19 1815    clonazePAM tablet 1 mg  1 mg Oral BID PRN Se Kolb MD   1 mg at 01/15/19 0955    diphenoxylate-atropine 2.5-0.025 mg per tablet 1 tablet  1 tablet Oral TID WM Se Kolb MD   1 tablet at 01/15/19 1216    lactated ringers infusion   Intravenous Continuous Se Kolb  mL/hr at 01/15/19 0408      loperamide capsule 2 mg  2 mg Oral QID Se Kolb MD   2 mg at 01/15/19 0859    magnesium oxide tablet 400 mg  400 mg Oral Daily Charlette Santana MD   400 mg at 01/15/19 0859    mirtazapine tablet 15 mg  15 mg Oral QHS Se Kolb MD   15 mg at 01/14/19 2145    oxyCODONE-acetaminophen  mg per tablet 1 tablet  1 tablet Oral Q6H PRN Charlette Santana MD   1 tablet at 01/15/19 0954    pantoprazole EC tablet 40 mg  40 mg Oral BID AC Se Kolb MD   40 mg at 01/15/19 0644    potassium citrate SR tablet 30 mEq  30 mEq Oral BID Charlette Santana MD   30 mEq at 01/15/19 0859    promethazine tablet 25 mg  25 mg Oral Q6H PRN Charlette Santana MD   25 mg at 01/15/19 0955    sodium chloride 0.9% flush 5 mL  5 mL Intravenous PRN Lubna Florez PA-C        sodium chloride 0.9% flush 5 mL  5 mL Intravenous PRN Charlette Santana MD        zolpidem tablet 5 mg  5 mg  Oral Nightly PRN Charlette Santana MD   5 mg at 01/14/19 2210       Review of Systems   Constitutional: Negative for fever.   HENT: Negative for trouble swallowing.    Eyes: Negative for visual disturbance.   Respiratory: Negative for shortness of breath.    Cardiovascular: Positive for palpitations. Negative for chest pain.   Neurological: Negative for tremors, seizures, syncope, speech difficulty and headaches.   Psychiatric/Behavioral: Negative for agitation and confusion.     Objective:     Vital Signs (Most Recent):  Temp: 98.3 °F (36.8 °C) (01/15/19 1156)  Pulse: 89 (01/15/19 1156)  Resp: 12 (01/15/19 1156)  BP: 133/88 (01/15/19 1156)  SpO2: 97 % (01/15/19 1156) Vital Signs (24h Range):  Temp:  [98 °F (36.7 °C)-98.4 °F (36.9 °C)] 98.3 °F (36.8 °C)  Pulse:  [69-89] 89  Resp:  [11-19] 12  SpO2:  [97 %-100 %] 97 %  BP: (110-133)/(69-88) 133/88     Weight: 42.2 kg (93 lb)  Body mass index is 17.57 kg/m².    Physical Exam   Constitutional: She is oriented to person, place, and time. No distress.   Eyes: EOM are normal. Pupils are equal, round, and reactive to light.   Neurological: She is alert and oriented to person, place, and time.   Reflex Scores:       Bicep reflexes are 2+ on the right side and 2+ on the left side.       Brachioradialis reflexes are 2+ on the right side and 2+ on the left side.       Patellar reflexes are 2+ on the right side and 2+ on the left side.       Achilles reflexes are 2+ on the right side and 2+ on the left side.  Psychiatric: Her speech is normal.   Nursing note and vitals reviewed.      NEUROLOGICAL EXAMINATION:     MENTAL STATUS   Oriented to person, place, and time.   Oriented to person.   Oriented to place.   Oriented to time.   Speech: speech is normal   Level of consciousness: alert    CRANIAL NERVES     CN III, IV, VI   Pupils are equal, round, and reactive to light.  Extraocular motions are normal.     CN V   Facial sensation intact.     CN VII   Facial expression  full, symmetric.     CN XI   CN XI normal.     CN XII   CN XII normal.     MOTOR EXAM        Strength 5/5 in all four extremities.     REFLEXES     Reflexes   Right brachioradialis: 2+  Left brachioradialis: 2+  Right biceps: 2+  Left biceps: 2+  Right patellar: 2+  Left patellar: 2+  Right achilles: 2+  Left achilles: 2+    GAIT AND COORDINATION     Tremor   Resting tremor: absent      Significant Labs:   Recent Lab Results       01/15/19  0545        Immature Granulocytes 0.2     Immature Grans (Abs) 0.01  Comment:  Mild elevation in immature granulocytes is non specific and   can be seen in a variety of conditions including stress response,   acute inflammation, trauma and pregnancy. Correlation with other   laboratory and clinical findings is essential.       Albumin 2.9     Alkaline Phosphatase 88     ALT 14     Anion Gap 7     AST 19     Baso # 0.02     Basophil% 0.4     Total Bilirubin 0.1  Comment:  For infants and newborns, interpretation of results should be based  on gestational age, weight and in agreement with clinical  observations.  Premature Infant recommended reference ranges:  Up to 24 hours.............<8.0 mg/dL  Up to 48 hours............<12.0 mg/dL  3-5 days..................<15.0 mg/dL  6-29 days.................<15.0 mg/dL       BUN, Bld 6     Calcium 8.8     Chloride 105     CO2 27     Creatinine 0.7     Differential Method Automated     eGFR if African American >60.0     eGFR if non  >60.0  Comment:  Calculation used to obtain the estimated glomerular filtration  rate (eGFR) is the CKD-EPI equation.        Eos # 0.1     Eosinophil% 2.4     Glucose 95     Gran # (ANC) 1.9     Gran% 37.9     Hematocrit 31.3     Hemoglobin 10.2     Lymph # 2.3     Lymph% 45.0     Magnesium 1.6     MCH 27.9     MCHC 32.6     MCV 86     Mono # 0.7     Mono% 14.1     MPV 9.9     nRBC 0     Phosphorus 4.2     Platelets 250     Potassium 3.8     Total Protein 6.1     RBC 3.65     RDW 14.7     Sodium  139     WBC 5.05           Significant Imaging: I have reviewed all pertinent imaging results/findings within the past 24 hours.    Assessment and Plan:     * Sinus tachycardia    -- see above  -- telemetry     Transient neurological symptoms    Episodes are concerning for complex partial seizures. Workup for endocrinologic and cardiac etiologies has been unrevealing. Exam was notable for brisk reflexes throughout, which suggests a CNS process.     CT head 8/30/18 unremarkable  Urine metanephrines mildly elevated (352 - range 0-300); endocrinology feel labs not supportive of pheo     Assessment and Recommendations:  -- MRI brain shows no acute abnormality apart from posterior pituitary enhancement. Per radiology, it is borderline abnormal. Pituitary panel and outpatient follow up with Endocrinology.  -- Routine EEG pending.   -- CPS is low on the differential but given that she has episodes of unawareness, concern still exists. Will start on AED  after obtaining EEG.  -- Seizure precautions  -- Telemetry     Palpitations    -- see above     Generalized anxiety disorder    -- reports significant worsening of anxiety since symptom onset, with minimal benefit from treatment so far     Crohn's disease of small intestine    -- previously on stelara which has PRES listed as adverse reaction - this was d/c 1 year ago  -- now on Entyvio infusions         VTE Risk Mitigation (From admission, onward)        Ordered     IP VTE LOW RISK PATIENT  Once      01/11/19 1922     Place JEANNA hose  Until discontinued      01/07/19 1903     Place sequential compression device  Until discontinued      01/07/19 1903        Neurology will follow up with EEG read. Please call with any questions  Neurology Spectra [phone]    James Mckinney MD  Neurology  [facility]

## 2019-01-16 ENCOUNTER — PATIENT MESSAGE (OUTPATIENT)
Dept: INTERNAL MEDICINE | Facility: CLINIC | Age: 39
End: 2019-01-16

## 2019-01-16 ENCOUNTER — PATIENT MESSAGE (OUTPATIENT)
Dept: UROLOGY | Facility: CLINIC | Age: 39
End: 2019-01-16

## 2019-01-16 ENCOUNTER — PATIENT MESSAGE (OUTPATIENT)
Dept: GASTROENTEROLOGY | Facility: CLINIC | Age: 39
End: 2019-01-16

## 2019-01-16 ENCOUNTER — PATIENT MESSAGE (OUTPATIENT)
Dept: NEUROLOGY | Facility: CLINIC | Age: 39
End: 2019-01-16

## 2019-01-16 VITALS
HEART RATE: 99 BPM | SYSTOLIC BLOOD PRESSURE: 104 MMHG | DIASTOLIC BLOOD PRESSURE: 70 MMHG | BODY MASS INDEX: 17.56 KG/M2 | HEIGHT: 61 IN | RESPIRATION RATE: 15 BRPM | TEMPERATURE: 99 F | OXYGEN SATURATION: 97 % | WEIGHT: 93 LBS

## 2019-01-16 LAB
ALBUMIN SERPL BCP-MCNC: 2.8 G/DL
ALP SERPL-CCNC: 89 U/L
ALT SERPL W/O P-5'-P-CCNC: 29 U/L
ANION GAP SERPL CALC-SCNC: 8 MMOL/L
AST SERPL-CCNC: 49 U/L
BASOPHILS # BLD AUTO: 0.02 K/UL
BASOPHILS NFR BLD: 0.4 %
BILIRUB SERPL-MCNC: 0.1 MG/DL
BUN SERPL-MCNC: 7 MG/DL
CALCIUM SERPL-MCNC: 9 MG/DL
CHLORIDE SERPL-SCNC: 105 MMOL/L
CO2 SERPL-SCNC: 26 MMOL/L
CREAT SERPL-MCNC: 0.7 MG/DL
DIFFERENTIAL METHOD: ABNORMAL
EOSINOPHIL # BLD AUTO: 0.2 K/UL
EOSINOPHIL NFR BLD: 3.6 %
ERYTHROCYTE [DISTWIDTH] IN BLOOD BY AUTOMATED COUNT: 14.6 %
EST. GFR  (AFRICAN AMERICAN): >60 ML/MIN/1.73 M^2
EST. GFR  (NON AFRICAN AMERICAN): >60 ML/MIN/1.73 M^2
GLUCOSE SERPL-MCNC: 89 MG/DL
HCT VFR BLD AUTO: 31.4 %
HGB BLD-MCNC: 9.9 G/DL
IMM GRANULOCYTES # BLD AUTO: 0.01 K/UL
IMM GRANULOCYTES NFR BLD AUTO: 0.2 %
LYMPHOCYTES # BLD AUTO: 1.8 K/UL
LYMPHOCYTES NFR BLD: 40.8 %
MAGNESIUM SERPL-MCNC: 1.6 MG/DL
MCH RBC QN AUTO: 28.1 PG
MCHC RBC AUTO-ENTMCNC: 31.5 G/DL
MCV RBC AUTO: 89 FL
MONOCYTES # BLD AUTO: 0.7 K/UL
MONOCYTES NFR BLD: 14.7 %
NEUTROPHILS # BLD AUTO: 1.8 K/UL
NEUTROPHILS NFR BLD: 40.3 %
NRBC BLD-RTO: 0 /100 WBC
PHOSPHATE SERPL-MCNC: 4.6 MG/DL
PLATELET # BLD AUTO: 251 K/UL
PMV BLD AUTO: 10.1 FL
POTASSIUM SERPL-SCNC: 4 MMOL/L
PROT SERPL-MCNC: 5.9 G/DL
RBC # BLD AUTO: 3.52 M/UL
SODIUM SERPL-SCNC: 139 MMOL/L
WBC # BLD AUTO: 4.49 K/UL

## 2019-01-16 PROCEDURE — 95816 EEG AWAKE AND DROWSY: CPT

## 2019-01-16 PROCEDURE — 63600175 PHARM REV CODE 636 W HCPCS: Performed by: INTERNAL MEDICINE

## 2019-01-16 PROCEDURE — 95819 PR EEG,W/AWAKE & ASLEEP RECORD: ICD-10-PCS | Mod: 26,,, | Performed by: PSYCHIATRY & NEUROLOGY

## 2019-01-16 PROCEDURE — 99238 HOSP IP/OBS DSCHRG MGMT 30/<: CPT | Mod: ,,, | Performed by: INTERNAL MEDICINE

## 2019-01-16 PROCEDURE — 25000003 PHARM REV CODE 250: Performed by: HOSPITALIST

## 2019-01-16 PROCEDURE — 84100 ASSAY OF PHOSPHORUS: CPT

## 2019-01-16 PROCEDURE — 83735 ASSAY OF MAGNESIUM: CPT

## 2019-01-16 PROCEDURE — 80053 COMPREHEN METABOLIC PANEL: CPT

## 2019-01-16 PROCEDURE — 85025 COMPLETE CBC W/AUTO DIFF WBC: CPT

## 2019-01-16 PROCEDURE — 99238 PR HOSPITAL DISCHARGE DAY,<30 MIN: ICD-10-PCS | Mod: ,,, | Performed by: INTERNAL MEDICINE

## 2019-01-16 PROCEDURE — 36415 COLL VENOUS BLD VENIPUNCTURE: CPT

## 2019-01-16 PROCEDURE — 95819 EEG AWAKE AND ASLEEP: CPT | Mod: 26,,, | Performed by: PSYCHIATRY & NEUROLOGY

## 2019-01-16 RX ORDER — HEPARIN 100 UNIT/ML
100 SYRINGE INTRAVENOUS
Status: DISCONTINUED | OUTPATIENT
Start: 2019-01-16 | End: 2019-01-16 | Stop reason: HOSPADM

## 2019-01-16 RX ORDER — CLONAZEPAM 1 MG/1
1 TABLET ORAL 2 TIMES DAILY PRN
Qty: 40 TABLET | Refills: 0 | Status: SHIPPED | OUTPATIENT
Start: 2019-01-16 | End: 2019-01-30 | Stop reason: SDUPTHER

## 2019-01-16 RX ORDER — DIVALPROEX SODIUM 500 MG/1
500 TABLET, DELAYED RELEASE ORAL EVERY 12 HOURS
Qty: 60 TABLET | Refills: 11 | Status: SHIPPED | OUTPATIENT
Start: 2019-01-16 | End: 2019-03-18

## 2019-01-16 RX ORDER — HEPARIN SODIUM (PORCINE) LOCK FLUSH IV SOLN 100 UNIT/ML 100 UNIT/ML
100 SOLUTION INTRAVENOUS
Status: DISCONTINUED | OUTPATIENT
Start: 2019-01-16 | End: 2019-01-16

## 2019-01-16 RX ORDER — MIRTAZAPINE 15 MG/1
15 TABLET, ORALLY DISINTEGRATING ORAL NIGHTLY
Qty: 30 TABLET | Refills: 11 | Status: SHIPPED | OUTPATIENT
Start: 2019-01-16 | End: 2019-01-30

## 2019-01-16 RX ADMIN — DIPHENOXYLATE HYDROCHLORIDE AND ATROPINE SULFATE 1 TABLET: 2.5; .025 TABLET ORAL at 12:01

## 2019-01-16 RX ADMIN — LOPERAMIDE HYDROCHLORIDE 2 MG: 2 CAPSULE ORAL at 08:01

## 2019-01-16 RX ADMIN — LOPERAMIDE HYDROCHLORIDE 2 MG: 2 CAPSULE ORAL at 04:01

## 2019-01-16 RX ADMIN — PANTOPRAZOLE SODIUM 40 MG: 40 TABLET, DELAYED RELEASE ORAL at 04:01

## 2019-01-16 RX ADMIN — SODIUM CHLORIDE, SODIUM LACTATE, POTASSIUM CHLORIDE, AND CALCIUM CHLORIDE: .6; .31; .03; .02 INJECTION, SOLUTION INTRAVENOUS at 12:01

## 2019-01-16 RX ADMIN — PROMETHAZINE HYDROCHLORIDE 25 MG: 25 TABLET ORAL at 02:01

## 2019-01-16 RX ADMIN — POTASSIUM CITRATE 30 MEQ: 10 TABLET ORAL at 08:01

## 2019-01-16 RX ADMIN — OXYCODONE HYDROCHLORIDE AND ACETAMINOPHEN 1 TABLET: 10; 325 TABLET ORAL at 07:01

## 2019-01-16 RX ADMIN — CLONAZEPAM 1 MG: 1 TABLET ORAL at 10:01

## 2019-01-16 RX ADMIN — OXYCODONE HYDROCHLORIDE AND ACETAMINOPHEN 1 TABLET: 10; 325 TABLET ORAL at 02:01

## 2019-01-16 RX ADMIN — SODIUM CHLORIDE, SODIUM LACTATE, POTASSIUM CHLORIDE, AND CALCIUM CHLORIDE: .6; .31; .03; .02 INJECTION, SOLUTION INTRAVENOUS at 10:01

## 2019-01-16 RX ADMIN — LOPERAMIDE HYDROCHLORIDE 2 MG: 2 CAPSULE ORAL at 12:01

## 2019-01-16 RX ADMIN — MAGNESIUM OXIDE TAB 400 MG (241.3 MG ELEMENTAL MG) 400 MG: 400 (241.3 MG) TAB at 08:01

## 2019-01-16 RX ADMIN — DIPHENOXYLATE HYDROCHLORIDE AND ATROPINE SULFATE 1 TABLET: 2.5; .025 TABLET ORAL at 04:01

## 2019-01-16 RX ADMIN — PANTOPRAZOLE SODIUM 40 MG: 40 TABLET, DELAYED RELEASE ORAL at 07:01

## 2019-01-16 RX ADMIN — HEPARIN 100 UNITS: 100 SYRINGE at 04:01

## 2019-01-16 RX ADMIN — DIPHENOXYLATE HYDROCHLORIDE AND ATROPINE SULFATE 1 TABLET: 2.5; .025 TABLET ORAL at 08:01

## 2019-01-16 NOTE — ASSESSMENT & PLAN NOTE
Episodes are concerning for complex partial seizures. Workup for endocrinologic and cardiac etiologies has been unrevealing. Exam was notable for brisk reflexes throughout, which suggests a CNS process.     CT head 8/30/18 unremarkable  Urine metanephrines mildly elevated (352 - range 0-300); endocrinology feel labs not supportive of pheo     Assessment and Recommendations:  -- MRI brain shows no acute abnormality apart from posterior pituitary enhancement. Per radiology, it is borderline abnormal. Pituitary panel and outpatient follow up with Endocrinology.  -- Routine EEG pending. Can discharge after EEG.  -- CPS is low on the differential but given that she has episodes of unawareness, concern still exists. Can start her on Depakote 500mg BID. Will follow up with in clinic (I will arrange).

## 2019-01-16 NOTE — ASSESSMENT & PLAN NOTE
-- reports significant worsening of anxiety since symptom onset, with minimal benefit from treatment so far  -- F/u Psych recs.

## 2019-01-16 NOTE — PROGRESS NOTES
[facility]  Neurology  Progress Note    Patient Name: Ivy Salgado  MRN: [id number]  Admission Date: 1/7/2019  Hospital Length of Stay: 9 days  Code Status: Full Code   Attending Provider: Ant Bourgeois MD  Primary Care Physician: Kalia Astorga MD   Principal Problem:Sinus tachycardia      Subjective:     Interval History: NAEON. Vitals stable. No further episodes since 1/10/19   Waiting on EEG.    Current Neurological Medications: Remeron    Current Facility-Administered Medications   Medication Dose Route Frequency Provider Last Rate Last Dose    acetaminophen tablet 650 mg  650 mg Oral Q8H PRN Charlette Santana MD   650 mg at 01/10/19 1815    clonazePAM tablet 1 mg  1 mg Oral BID PRN Se Kolb MD   1 mg at 01/16/19 1032    diphenoxylate-atropine 2.5-0.025 mg per tablet 1 tablet  1 tablet Oral TID WM Se Kolb MD   1 tablet at 01/16/19 0839    lactated ringers infusion   Intravenous Continuous Se Kolb  mL/hr at 01/16/19 1032      loperamide capsule 2 mg  2 mg Oral QID Se Kolb MD   2 mg at 01/16/19 0839    magnesium oxide tablet 400 mg  400 mg Oral Daily Charlette Santana MD   400 mg at 01/16/19 0840    mirtazapine tablet 15 mg  15 mg Oral QHS Se Kolb MD   15 mg at 01/15/19 2155    oxyCODONE-acetaminophen  mg per tablet 1 tablet  1 tablet Oral Q6H PRN Charlette Santana MD   1 tablet at 01/16/19 0726    pantoprazole EC tablet 40 mg  40 mg Oral BID AC Se Kolb MD   40 mg at 01/16/19 0727    potassium citrate SR tablet 30 mEq  30 mEq Oral BID Charlette Santana MD   30 mEq at 01/16/19 0839    promethazine tablet 25 mg  25 mg Oral Q6H PRN Charlette Santana MD   25 mg at 01/15/19 2209    sodium chloride 0.9% flush 5 mL  5 mL Intravenous PRN Lubna P. Vikki, PA-C        sodium chloride 0.9% flush 5 mL  5 mL Intravenous PRN Charlette Santana MD        zolpidem  tablet 5 mg  5 mg Oral Nightly PRN Charlette Santana MD   5 mg at 01/15/19 3717       Review of Systems   Constitutional: Negative for fever.   HENT: Negative for trouble swallowing.    Respiratory: Negative for shortness of breath.    Cardiovascular: Positive for palpitations. Negative for chest pain.   Neurological: Negative for seizures and syncope.   Psychiatric/Behavioral: Negative for agitation and confusion.     Objective:     Vital Signs (Most Recent):  Temp: 98 °F (36.7 °C) (01/16/19 0030)  Pulse: 78 (01/16/19 0814)  Resp: 13 (01/16/19 0814)  BP: 98/66 (01/16/19 0814)  SpO2: 97 % (01/16/19 0814) Vital Signs (24h Range):  Temp:  [98 °F (36.7 °C)-98.9 °F (37.2 °C)] 98 °F (36.7 °C)  Pulse:  [73-92] 78  Resp:  [10-14] 13  SpO2:  [97 %-98 %] 97 %  BP: ()/(61-88) 98/66     Weight: 42.2 kg (93 lb)  Body mass index is 17.57 kg/m².    Physical Exam   Constitutional: She is oriented to person, place, and time. No distress.   Eyes: EOM are normal. Pupils are equal, round, and reactive to light.   Cardiovascular: Normal rate.   Pulmonary/Chest: Effort normal.   Neurological: She is alert and oriented to person, place, and time.   Psychiatric: Her speech is normal.   Nursing note and vitals reviewed.      NEUROLOGICAL EXAMINATION:     MENTAL STATUS   Oriented to person, place, and time.   Oriented to person.   Oriented to place.   Oriented to time.   Speech: speech is normal   Level of consciousness: alert    CRANIAL NERVES     CN III, IV, VI   Pupils are equal, round, and reactive to light.  Extraocular motions are normal.     CN VII   Facial expression full, symmetric.     CN XII   CN XII normal.     MOTOR EXAM        Strength 5/5 in all four extremities.     SENSORY EXAM   Light touch normal.     GAIT AND COORDINATION     Tremor   Resting tremor: absent      Significant Labs:   Recent Lab Results       01/16/19  0539        Immature Granulocytes 0.2     Immature Grans (Abs) 0.01  Comment:  Mild  elevation in immature granulocytes is non specific and   can be seen in a variety of conditions including stress response,   acute inflammation, trauma and pregnancy. Correlation with other   laboratory and clinical findings is essential.       Albumin 2.8     Alkaline Phosphatase 89     ALT 29     Anion Gap 8     AST 49     Baso # 0.02     Basophil% 0.4     Total Bilirubin 0.1  Comment:  For infants and newborns, interpretation of results should be based  on gestational age, weight and in agreement with clinical  observations.  Premature Infant recommended reference ranges:  Up to 24 hours.............<8.0 mg/dL  Up to 48 hours............<12.0 mg/dL  3-5 days..................<15.0 mg/dL  6-29 days.................<15.0 mg/dL       BUN, Bld 7     Calcium 9.0     Chloride 105     CO2 26     Creatinine 0.7     Differential Method Automated     eGFR if African American >60.0     eGFR if non  >60.0  Comment:  Calculation used to obtain the estimated glomerular filtration  rate (eGFR) is the CKD-EPI equation.        Eos # 0.2     Eosinophil% 3.6     Glucose 89     Gran # (ANC) 1.8     Gran% 40.3     Hematocrit 31.4     Hemoglobin 9.9     Lymph # 1.8     Lymph% 40.8     Magnesium 1.6     MCH 28.1     MCHC 31.5     MCV 89     Mono # 0.7     Mono% 14.7     MPV 10.1     nRBC 0     Phosphorus 4.6     Platelets 251     Potassium 4.0     Total Protein 5.9     RBC 3.52     RDW 14.6     Sodium 139     WBC 4.49           Significant Imaging: I have reviewed all pertinent imaging results/findings within the past 24 hours.    Assessment and Plan:     * Sinus tachycardia    -- see above  -- telemetry     Transient neurological symptoms    Episodes are concerning for complex partial seizures. Workup for endocrinologic and cardiac etiologies has been unrevealing. Exam was notable for brisk reflexes throughout, which suggests a CNS process.     CT head 8/30/18 unremarkable  Urine metanephrines mildly elevated (352 -  range 0-300); endocrinology feel labs not supportive of pheo     Assessment and Recommendations:  -- MRI brain shows no acute abnormality apart from posterior pituitary enhancement. Per radiology, it is borderline abnormal. Pituitary panel and outpatient follow up with Endocrinology.  -- Routine EEG pending. Can discharge after EEG.  -- CPS is low on the differential but given that she has episodes of unawareness, concern still exists. Can start her on Depakote 500mg BID. Will follow up with in clinic (I will arrange).     Palpitations    -- see above     Generalized anxiety disorder    -- reports significant worsening of anxiety since symptom onset, with minimal benefit from treatment so far  -- F/u Psych recs.     Crohn's disease of small intestine    -- previously on stelara which has PRES listed as adverse reaction - this was d/c 1 year ago  -- now on Entyvio infusions         VTE Risk Mitigation (From admission, onward)        Ordered     IP VTE LOW RISK PATIENT  Once      01/11/19 1922     Place JEANNA hose  Until discontinued      01/07/19 1903     Place sequential compression device  Until discontinued      01/07/19 1903        Neurology will sign off. Please call with any questions.  Neurology Spectra [phone]    James Mckinney MD  Neurology  [facility]

## 2019-01-16 NOTE — NURSING
Port flushed with heparin and deaccessed.  Bleeding controlled.  Tele, VISI, and IPAd removed.  Discharge papers reviewed and given to pt.  Pt verbalizes understanding.  Awaiting ride home.

## 2019-01-16 NOTE — DISCHARGE SUMMARY
Discharge Summary  Hospital Medicine    I personally performed the history, physical exam and medical decision making: and confirmed the accuracy of the information in the transcribed note.  Ant Bourgeois M.D.  Pager: [phone]  Cell Phone: [phone]    Attending Provider on Discharge: Ant Bourgeois MD  [facility] Medicine Team: [facility] HOSP MED B  Date of Admission:  1/7/2019     Date of Discharge:    Length of Stay:  LOS: 9 days   Code status: Full Code    Active Hospital Problems    Diagnosis  POA    *Sinus tachycardia [R00.0]  Yes    Transient neurological symptoms [R29.818]  Yes     Episodes of full body tremors, palpitations, tachycardia, HTN, flushing with associated alterations of awareness  2min to >20min      Moderate malnutrition [E44.0]  Yes    Diarrhea [R19.7]  Yes    Weight loss [R63.4]  Yes    Hypomagnesemia [E83.42]  Yes    Anemia [D64.9]  Yes    Urinary tract infection with hematuria [N39.0, R31.9]  Yes    Hypokalemia [E87.6]  Yes    Palpitations [R00.2]  Yes    Appetite impaired [R63.0]  Yes    Crohn's disease of small intestine [K50.00]  Yes     Chronic    Generalized anxiety disorder [F41.1]  Yes      Resolved Hospital Problems   No resolved problems to display.        HPI   Ms. Salgado is a 39 yo F with HTN, Crohn's disease (s/p total colectomy w/ end ileostomy (2012)) on Entyvio infusions, recurrent UTIs and osteopenia who presents to the ED with a chief complaint of palpitations. She reports abrupt onset of palpitations associated with tachycardia, facial flushing, and tremulousness 20 minutes prior to arrival. She was eating lunch with her daughter and mother when the symptoms started. She attempted treatment with xanax with minimal improvement. Symptoms were still present at time of evaluation. She has a prior hx of similar episodes for over a year now, which have been attributed to dehydration (2/2 malabsorption) and anxiety. Patient also notes weight loss, overall not feeling well  today. She denies fever, chills, URI symptoms, nausea/vomiting, abdominal pain, changes in urination or stool output. She denies any precipitating stressors. She has been worked up for this recently by Endocrinology and Cardiology, with plasma catecholamines/metanephrines, as well as event recorders (9/18-->20 symptomatic transmissions all c/w sinus tachycardia; 4/18 also revealed sinus rhythm and sinus tachycardia). She is in the process of establishing with neuroendocrine for evaluation for carcinoid.      In the ED, an EKG showed sinus tachycardia, similar to previous episodes. A CXR was -ve for acute processes. Her CBC/CMP were notable only for mild hypokalemia. A UA was +ve for bacteria, nitrites. She denied dysuria, but endorsed frequency.      Upon chart review, patient was most recently treated with Augmentin, Bactrim and Keflex in December for UTI. Cultures have previously grown: 12/04 Klebsiella (R to ampicillin), 11/21 Klebsiella (R to ampicillin), 9/18 Enterococcus (R to tetracycline), 4/12 Enterobacter (pan sensitive), 2/02 ESBL (R to cephlosporins, FQs, ampicillin).     Hospital Course   Ms. Salgado is a 39 yo F with HTN, Crohn's disease (s/p total colectomy w/ end ileostomy (2012)) on Entyvio infusions, recurrent UTIs and osteopenia who presents to the ED with a chief complaint of palpitations. Telemetry with NSR. Psychiatry consulted for panic attacks/depression. Klebsiella found in urine cx. Patient started on PRN klonopin and augmentin. Neurology consulted for possible seizure, MRI brain with no acute abnormality. EEG read and ok. Patient discharged home.     Recent Labs   Lab 01/14/19  0554 01/15/19  0545 01/16/19  0539   WBC 4.88 5.05 4.49   HGB 10.3* 10.2* 9.9*   HCT 31.9* 31.3* 31.4*    250 251     Recent Labs   Lab 01/14/19  0554 01/15/19  0545 01/16/19  0539    139 139   K 4.0 3.8 4.0    105 105   CO2 27 27 26   BUN 6 6 7   CREATININE 0.7 0.7 0.7   GLU 92 95 89   CALCIUM 9.1  8.8 9.0   MG 1.7 1.6 1.6   PHOS 3.9 4.2 4.6*     Recent Labs   Lab 01/14/19  0554 01/15/19  0545 01/16/19  0539   ALKPHOS 81 88 89   ALT 12 14 29   AST 17 19 49*   ALBUMIN 3.0* 2.9* 2.8*   PROT 6.1 6.1 5.9*   BILITOT 0.2 0.1 0.1      No results for input(s): CPK, CPKMB, MB, TROPONINI in the last 72 hours.  No results for input(s): LACTATE in the last 72 hours.    No results for input(s): POCTGLUCOSE in the last 168 hours.   Hemoglobin A1C   Date Value Ref Range Status   09/18/2018 4.8 4.0 - 5.6 % Final     Comment:     ADA Screening Guidelines:  5.7-6.4%  Consistent with prediabetes  >or=6.5%  Consistent with diabetes  High levels of fetal hemoglobin interfere with the HbA1C  assay. Heterozygous hemoglobin variants (HbS, HgC, etc)do  not significantly interfere with this assay.   However, presence of multiple variants may affect accuracy.     08/30/2018 4.8 4.0 - 5.6 % Final     Comment:     ADA Screening Guidelines:  5.7-6.4%  Consistent with prediabetes  >or=6.5%  Consistent with diabetes  High levels of fetal hemoglobin interfere with the HbA1C  assay. Heterozygous hemoglobin variants (HbS, HgC, etc)do  not significantly interfere with this assay.   However, presence of multiple variants may affect accuracy.     02/06/2018 4.6 4.0 - 5.6 % Final     Comment:     According to ADA guidelines, hemoglobin A1c <7.0% represents  optimal control in non-pregnant diabetic patients. Different  metrics may apply to specific patient populations.   Standards of Medical Care in Diabetes-2016.  For the purpose of screening for the presence of diabetes:  <5.7%     Consistent with the absence of diabetes  5.7-6.4%  Consistent with increasing risk for diabetes   (prediabetes)  >or=6.5%  Consistent with diabetes  Currently, no consensus exists for use of hemoglobin A1c  for diagnosis of diabetes for children.  This Hemoglobin A1c assay has significant interference with fetal   hemoglobin   (HbF). The results are invalid for patients  with abnormal amounts of   HbF,   including those with known Hereditary Persistence   of Fetal Hemoglobin. Heterozygous hemoglobin variants (HbAS, HbAC,   HbAD, HbAE, HbA2) do not significantly interfere with this assay;   however, presence of multiple variants in a sample may impact the %   interference.         Procedures: EEG    Consultants: Neurology: Seizures, Nutrition: diet, Psychiatry: panic attacks/depression, Gastroenterology: Crohns disease of small intestine, Infectious disease: Urinary tract infection with hematuria, Endocrinology: sinus tachycardia    Current Discharge Medication List      START taking these medications    Details   divalproex (DEPAKOTE) 500 MG TbEC Take 1 tablet (500 mg total) by mouth every 12 (twelve) hours.  Qty: 60 tablet, Refills: 11         CONTINUE these medications which have NOT CHANGED    Details   ALPRAZolam (XANAX) 1 MG tablet Take 1 tablet (1 mg total) by mouth 2 (two) times daily. as needed for anxiety.  Qty: 60 tablet, Refills: 0    Associated Diagnoses: Anxiety      diphenoxylate-atropine 2.5-0.025 mg (LOMOTIL) 2.5-0.025 mg per tablet Take 1 tablet by mouth 4 (four) times daily as needed for Diarrhea.  Qty: 100 tablet, Refills: 0    Comments: Not to exceed 5 additional fills before 05/12/2018  Associated Diagnoses: Crohn's disease of small and large intestines with complication      dronabinol (MARINOL) 5 MG capsule Take 1 capsule (5 mg total) by mouth 2 (two) times daily before meals.  Qty: 60 capsule, Refills: 1    Comments: This request is for a new prescription for a controlled substance as required by Federal/State law.  Associated Diagnoses: Crohn's disease of both small and large intestine with complication; Encounter for long-term (current) use of high-risk medication      loperamide (IMODIUM) 2 mg capsule Take 2 mg by mouth daily as needed for Diarrhea.      magnesium 250 mg Tab Take by mouth once.      multivitamin (ONE DAILY MULTIVITAMIN) per tablet Take 1  tablet by mouth once daily.      potassium citrate 15 mEq TbSR Take 2 tablets by mouth 2 (two) times daily.  Qty: 360 tablet, Refills: 3      promethazine (PHENERGAN) 25 MG tablet Take 1 tablet (25 mg total) by mouth every 6 (six) hours as needed.  Qty: 30 tablet, Refills: 3      valACYclovir (VALTREX) 500 MG tablet TK 1 T PO QD  Refills: 2      clindamycin (CLINDESSE) 2 % vaginal cream PLACE VAGINALLY EVERY EVENING  Qty: 40 g, Refills: 0      flu vac [id number] 36mos up,PF, 60 mcg (15 mcg x 4)/0.5 mL Syrg inject into the muscle  Qty: 0.5 mL, Refills: 0      oxyCODONE-acetaminophen (PERCOCET)  mg per tablet Take 1 tablet by mouth every 6 (six) hours as needed for Pain.  Qty: 40 tablet, Refills: 0    Associated Diagnoses: Crohn's disease of small and large intestines with complication      terconazole (TERAZOL 7) 0.4 % Crea INSERT ONE APPLICATORFUL VAGINALLY AT BEDTIME  Qty: 45 g, Refills: 0      zolpidem (AMBIEN) 10 mg Tab TAKE 1 TABLET BY MOUTH EVERY EVENING  Qty: 30 tablet, Refills: 0    Associated Diagnoses: Anxiety         STOP taking these medications       butalbital-acetaminophen-caffeine -40 mg (FIORICET, ESGIC) -40 mg per tablet Comments:   Reason for Stopping:               Discharge Diet:regular diet with Normal Fluid intake of 1500 - 2000 mL per day    Activity: activity as tolerated    Discharge Condition: Good    Disposition: Home or Self Care    Tests pending at the time of discharge: none      Time spent  on the discharge of the patient including review of hospital course with the patient. reviewing discharge medications and arranging follow-up care     Discharge examination Patient was seen and examined on the date of discharge and determined to be suitable for discharge.    Discharge plan     Future Appointments   Date Time Provider Department Center   1/17/2019 10:00 AM CHAIR 05 [facility] CHEMO [facility]   1/23/2019  2:30 PM Parish Ross MD [facility] GASTRO [address]   2/6/2019   2:30 PM James Mckinney MD [facility] NEURO [address]   2/14/2019  2:00 PM [facility] [id number] LB LIMIT [facility] XRAY IM [address] [facility]   2/28/2019 10:00 AM CHAIR 02 [facility]   3/13/2019  9:15 AM Phu Obrien MD [facility]     I personally scribed for Ant Bourgeois MD on 01/16/2019 at 10:34 AM. Electronically signed by justa Mcintyre III on 01/16/2019 at 10:34 AM

## 2019-01-16 NOTE — SUBJECTIVE & OBJECTIVE
Subjective:     Interval History: NAEON. Vitals stable. No further episodes since 1/10/19   Waiting on EEG.    Current Neurological Medications: Remeron    Current Facility-Administered Medications   Medication Dose Route Frequency Provider Last Rate Last Dose    acetaminophen tablet 650 mg  650 mg Oral Q8H PRN Charlette Santana MD   650 mg at 01/10/19 1815    clonazePAM tablet 1 mg  1 mg Oral BID PRN Se Kolb MD   1 mg at 01/16/19 1032    diphenoxylate-atropine 2.5-0.025 mg per tablet 1 tablet  1 tablet Oral TID WM Se Kolb MD   1 tablet at 01/16/19 0839    lactated ringers infusion   Intravenous Continuous Se Kolb  mL/hr at 01/16/19 1032      loperamide capsule 2 mg  2 mg Oral QID Se Kolb MD   2 mg at 01/16/19 0839    magnesium oxide tablet 400 mg  400 mg Oral Daily Charlette Santana MD   400 mg at 01/16/19 0840    mirtazapine tablet 15 mg  15 mg Oral QHS eS Kolb MD   15 mg at 01/15/19 2155    oxyCODONE-acetaminophen  mg per tablet 1 tablet  1 tablet Oral Q6H PRN Charlette Santana MD   1 tablet at 01/16/19 0726    pantoprazole EC tablet 40 mg  40 mg Oral BID AC Se Kolb MD   40 mg at 01/16/19 0727    potassium citrate SR tablet 30 mEq  30 mEq Oral BID Charlette Santana MD   30 mEq at 01/16/19 0839    promethazine tablet 25 mg  25 mg Oral Q6H PRN Charlette Santana MD   25 mg at 01/15/19 2209    sodium chloride 0.9% flush 5 mL  5 mL Intravenous PRN Lubna Florez PA-C        sodium chloride 0.9% flush 5 mL  5 mL Intravenous PRN Charlette Santana MD        zolpidem tablet 5 mg  5 mg Oral Nightly PRN Charlette Santana MD   5 mg at 01/15/19 2212       Review of Systems   Constitutional: Negative for fever.   HENT: Negative for trouble swallowing.    Respiratory: Negative for shortness of breath.    Cardiovascular: Positive for palpitations. Negative for chest pain.    Neurological: Negative for seizures and syncope.   Psychiatric/Behavioral: Negative for agitation and confusion.     Objective:     Vital Signs (Most Recent):  Temp: 98 °F (36.7 °C) (01/16/19 0030)  Pulse: 78 (01/16/19 0814)  Resp: 13 (01/16/19 0814)  BP: 98/66 (01/16/19 0814)  SpO2: 97 % (01/16/19 0814) Vital Signs (24h Range):  Temp:  [98 °F (36.7 °C)-98.9 °F (37.2 °C)] 98 °F (36.7 °C)  Pulse:  [73-92] 78  Resp:  [10-14] 13  SpO2:  [97 %-98 %] 97 %  BP: ()/(61-88) 98/66     Weight: 42.2 kg (93 lb)  Body mass index is 17.57 kg/m².    Physical Exam   Constitutional: She is oriented to person, place, and time. No distress.   Eyes: EOM are normal. Pupils are equal, round, and reactive to light.   Cardiovascular: Normal rate.   Pulmonary/Chest: Effort normal.   Neurological: She is alert and oriented to person, place, and time.   Psychiatric: Her speech is normal.   Nursing note and vitals reviewed.      NEUROLOGICAL EXAMINATION:     MENTAL STATUS   Oriented to person, place, and time.   Oriented to person.   Oriented to place.   Oriented to time.   Speech: speech is normal   Level of consciousness: alert    CRANIAL NERVES     CN III, IV, VI   Pupils are equal, round, and reactive to light.  Extraocular motions are normal.     CN VII   Facial expression full, symmetric.     CN XII   CN XII normal.     MOTOR EXAM        Strength 5/5 in all four extremities.     SENSORY EXAM   Light touch normal.     GAIT AND COORDINATION     Tremor   Resting tremor: absent      Significant Labs:   Recent Lab Results       01/16/19  0539        Immature Granulocytes 0.2     Immature Grans (Abs) 0.01  Comment:  Mild elevation in immature granulocytes is non specific and   can be seen in a variety of conditions including stress response,   acute inflammation, trauma and pregnancy. Correlation with other   laboratory and clinical findings is essential.       Albumin 2.8     Alkaline Phosphatase 89     ALT 29     Anion Gap 8     AST  49     Baso # 0.02     Basophil% 0.4     Total Bilirubin 0.1  Comment:  For infants and newborns, interpretation of results should be based  on gestational age, weight and in agreement with clinical  observations.  Premature Infant recommended reference ranges:  Up to 24 hours.............<8.0 mg/dL  Up to 48 hours............<12.0 mg/dL  3-5 days..................<15.0 mg/dL  6-29 days.................<15.0 mg/dL       BUN, Bld 7     Calcium 9.0     Chloride 105     CO2 26     Creatinine 0.7     Differential Method Automated     eGFR if African American >60.0     eGFR if non  >60.0  Comment:  Calculation used to obtain the estimated glomerular filtration  rate (eGFR) is the CKD-EPI equation.        Eos # 0.2     Eosinophil% 3.6     Glucose 89     Gran # (ANC) 1.8     Gran% 40.3     Hematocrit 31.4     Hemoglobin 9.9     Lymph # 1.8     Lymph% 40.8     Magnesium 1.6     MCH 28.1     MCHC 31.5     MCV 89     Mono # 0.7     Mono% 14.7     MPV 10.1     nRBC 0     Phosphorus 4.6     Platelets 251     Potassium 4.0     Total Protein 5.9     RBC 3.52     RDW 14.6     Sodium 139     WBC 4.49           Significant Imaging: I have reviewed all pertinent imaging results/findings within the past 24 hours.

## 2019-01-21 ENCOUNTER — OFFICE VISIT (OUTPATIENT)
Dept: UROLOGY | Facility: CLINIC | Age: 39
End: 2019-01-21
Attending: UROLOGY
Payer: MEDICAID

## 2019-01-21 VITALS
HEIGHT: 61 IN | DIASTOLIC BLOOD PRESSURE: 68 MMHG | WEIGHT: 93.06 LBS | SYSTOLIC BLOOD PRESSURE: 104 MMHG | HEART RATE: 87 BPM | BODY MASS INDEX: 17.57 KG/M2

## 2019-01-21 DIAGNOSIS — N20.0 NEPHROLITHIASIS: ICD-10-CM

## 2019-01-21 DIAGNOSIS — N39.0 RECURRENT UTI: Primary | ICD-10-CM

## 2019-01-21 LAB
BILIRUB SERPL-MCNC: NORMAL MG/DL
BLOOD URINE, POC: NORMAL
COLOR, POC UA: YELLOW
GLUCOSE UR QL STRIP: NORMAL
KETONES UR QL STRIP: NORMAL
LEUKOCYTE ESTERASE URINE, POC: NORMAL
NITRITE, POC UA: NORMAL
PH, POC UA: 5
PROTEIN, POC: NORMAL
SPECIFIC GRAVITY, POC UA: 1.01
UROBILINOGEN, POC UA: NORMAL

## 2019-01-21 PROCEDURE — 81002 POCT URINE DIPSTICK WITHOUT MICROSCOPE: ICD-10-PCS | Mod: S$GLB,,, | Performed by: UROLOGY

## 2019-01-21 PROCEDURE — 99214 OFFICE O/P EST MOD 30 MIN: CPT | Mod: 25,S$GLB,, | Performed by: UROLOGY

## 2019-01-21 PROCEDURE — 99214 PR OFFICE/OUTPT VISIT, EST, LEVL IV, 30-39 MIN: ICD-10-PCS | Mod: 25,S$GLB,, | Performed by: UROLOGY

## 2019-01-21 PROCEDURE — 81002 URINALYSIS NONAUTO W/O SCOPE: CPT | Mod: S$GLB,,, | Performed by: UROLOGY

## 2019-01-21 RX ORDER — CEPHALEXIN 250 MG/1
250 CAPSULE ORAL DAILY
Qty: 30 CAPSULE | Refills: 5 | Status: SHIPPED | OUTPATIENT
Start: 2019-01-21 | End: 2019-09-18 | Stop reason: SDUPTHER

## 2019-01-21 NOTE — PROGRESS NOTES
"Subjective:      Ivy Salgado is a 38 y.o. female who returns today regarding her UTIs.    Full history detailed elsewhere.    She has now had 3 UTIs in the past 2-3 months. These started after starting a new biologic medication for her Crohn's disease. Prior to that she had gone several months without UTI.     All 3 recent infections w/ Klebsiella.     The following portions of the patient's history were reviewed and updated as appropriate: allergies, current medications, past family history, past medical history, past social history, past surgical history and problem list.    Review of Systems  A comprehensive multipoint review of systems was negative except as otherwise stated in the HPI.     Objective:   Vitals: /68 (BP Location: Left arm, Patient Position: Sitting, BP Method: Large (Automatic))   Pulse 87   Ht 5' 1" (1.549 m)   Wt 42.2 kg (93 lb 0.6 oz)   BMI 17.58 kg/m²     Physical Exam   General: alert and oriented, no acute distress  Respiratory: Symmetric expansion, non-labored breathing  Neuro: no gross deficits  Psych: normal judgment and insight, normal mood/affect and non-anxious    Lab Review     Lab Results   Component Value Date    WBC 4.49 01/16/2019    HGB 9.9 (L) 01/16/2019    HCT 31.4 (L) 01/16/2019    MCV 89 01/16/2019     01/16/2019     Lab Results   Component Value Date    CREATININE 0.7 01/16/2019    BUN 7 01/16/2019     Date: 11/2/17 -- Vol 0.50 L, Ca 95 mg/day, Ox 11 mg/day, Cit 132 mg/day, pH 5.319, Na 21, K 20, Mg 18, Cr 796     Imaging   Results for orders placed during the hospital encounter of 09/13/18   X-Ray Abdomen AP 1 View    Narrative EXAMINATION:  XR ABDOMEN AP 1 VIEW    CLINICAL HISTORY:  Calculus of kidney    TECHNIQUE:  Single AP View of the abdomen was performed.    COMPARISON:  CT 02/16/2018    FINDINGS:  There is ostomy projecting over the right lower abdomen with multiple extrinsic monitoring leads overlying the abdomen and pelvis.    Subcentimeter " hyperdensity projects over the lower pole of the left renal shadow partially obscured by extrinsic leads concerning for renal calculus as identified on CT.  Few scattered gas-filled loops of bowel within the abdomen and pelvis overall nonspecific bowel gas pattern.      Impression Please see above      Electronically signed by: Samson Duron DO  Date:    09/13/2018  Time:    10:26       Assessment and Plan:   1. History of recurrent UTIs  -- Recommend prophylactic abx while continuing Crohn's treatment - will trial Keflex 250 daily    2. Nephrolithiasis  -- Minimal stone burden on recent CT  -- Unlikely contributing to UTIs so will avoid treatment for now  -- Continue UroCit K 30 mEq BID    3. Low urine volume   4. Hypocitraturia  5. Low urine pH  -- Previously addressed    FU as scheduled

## 2019-01-22 ENCOUNTER — TELEPHONE (OUTPATIENT)
Dept: PHARMACY | Facility: CLINIC | Age: 39
End: 2019-01-22

## 2019-01-22 ENCOUNTER — INFUSION (OUTPATIENT)
Dept: INFUSION THERAPY | Facility: OTHER | Age: 39
End: 2019-01-22
Attending: INTERNAL MEDICINE
Payer: MEDICAID

## 2019-01-22 ENCOUNTER — OFFICE VISIT (OUTPATIENT)
Dept: INTERNAL MEDICINE | Facility: CLINIC | Age: 39
End: 2019-01-22
Payer: MEDICAID

## 2019-01-22 VITALS
HEIGHT: 61 IN | WEIGHT: 101 LBS | SYSTOLIC BLOOD PRESSURE: 120 MMHG | DIASTOLIC BLOOD PRESSURE: 76 MMHG | BODY MASS INDEX: 19.07 KG/M2 | HEART RATE: 92 BPM

## 2019-01-22 VITALS
HEIGHT: 61 IN | BODY MASS INDEX: 19.11 KG/M2 | WEIGHT: 101.19 LBS | DIASTOLIC BLOOD PRESSURE: 82 MMHG | HEART RATE: 92 BPM | TEMPERATURE: 99 F | RESPIRATION RATE: 16 BRPM | SYSTOLIC BLOOD PRESSURE: 132 MMHG | OXYGEN SATURATION: 98 %

## 2019-01-22 DIAGNOSIS — K50.019 CROHN'S DISEASE OF SMALL INTESTINE WITH COMPLICATION: ICD-10-CM

## 2019-01-22 DIAGNOSIS — M25.512 ACUTE PAIN OF LEFT SHOULDER: ICD-10-CM

## 2019-01-22 DIAGNOSIS — N39.0 RECURRENT UTI: Primary | Chronic | ICD-10-CM

## 2019-01-22 DIAGNOSIS — R00.2 PALPITATIONS: ICD-10-CM

## 2019-01-22 DIAGNOSIS — K50.019 CROHN'S DISEASE OF SMALL INTESTINE WITH COMPLICATION: Primary | ICD-10-CM

## 2019-01-22 PROCEDURE — 99214 PR OFFICE/OUTPT VISIT, EST, LEVL IV, 30-39 MIN: ICD-10-PCS | Mod: S$PBB,,, | Performed by: INTERNAL MEDICINE

## 2019-01-22 PROCEDURE — 25000003 PHARM REV CODE 250: Performed by: INTERNAL MEDICINE

## 2019-01-22 PROCEDURE — 99999 PR PBB SHADOW E&M-EST. PATIENT-LVL III: ICD-10-PCS | Mod: PBBFAC,,, | Performed by: INTERNAL MEDICINE

## 2019-01-22 PROCEDURE — 96360 HYDRATION IV INFUSION INIT: CPT

## 2019-01-22 PROCEDURE — 99213 OFFICE O/P EST LOW 20 MIN: CPT | Mod: PBBFAC,25 | Performed by: INTERNAL MEDICINE

## 2019-01-22 PROCEDURE — 96361 HYDRATE IV INFUSION ADD-ON: CPT

## 2019-01-22 PROCEDURE — 99999 PR PBB SHADOW E&M-EST. PATIENT-LVL III: CPT | Mod: PBBFAC,,, | Performed by: INTERNAL MEDICINE

## 2019-01-22 PROCEDURE — 99214 OFFICE O/P EST MOD 30 MIN: CPT | Mod: S$PBB,,, | Performed by: INTERNAL MEDICINE

## 2019-01-22 PROCEDURE — 63600175 PHARM REV CODE 636 W HCPCS: Performed by: INTERNAL MEDICINE

## 2019-01-22 RX ORDER — HEPARIN 100 UNIT/ML
500 SYRINGE INTRAVENOUS
Status: CANCELLED | OUTPATIENT
Start: 2019-03-05

## 2019-01-22 RX ORDER — HEPARIN 100 UNIT/ML
500 SYRINGE INTRAVENOUS
Status: COMPLETED | OUTPATIENT
Start: 2019-01-22 | End: 2019-01-22

## 2019-01-22 RX ADMIN — SODIUM CHLORIDE: 0.9 INJECTION, SOLUTION INTRAVENOUS at 09:01

## 2019-01-22 RX ADMIN — SODIUM CHLORIDE: 0.9 INJECTION, SOLUTION INTRAVENOUS at 08:01

## 2019-01-22 RX ADMIN — HEPARIN SODIUM (PORCINE) LOCK FLUSH IV SOLN 100 UNIT/ML 500 UNITS: 100 SOLUTION at 10:01

## 2019-01-22 NOTE — PLAN OF CARE
Problem: Adult Inpatient Plan of Care  Goal: Plan of Care Review  Outcome: Ongoing (interventions implemented as appropriate)  2L IVF administered, patient tolerated well. VSS, NAD. D/C'd home with self.
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
## 2019-01-23 DIAGNOSIS — K50.819 CROHN'S DISEASE OF SMALL AND LARGE INTESTINES WITH COMPLICATION: ICD-10-CM

## 2019-01-23 RX ORDER — DIPHENOXYLATE HYDROCHLORIDE AND ATROPINE SULFATE 2.5; .025 MG/1; MG/1
1 TABLET ORAL 4 TIMES DAILY PRN
Qty: 100 TABLET | Refills: 0 | Status: SHIPPED | OUTPATIENT
Start: 2019-01-23 | End: 2019-02-25 | Stop reason: SDUPTHER

## 2019-01-23 RX ORDER — TERCONAZOLE 4 MG/G
CREAM VAGINAL
Qty: 45 G | Refills: 0 | Status: ON HOLD | OUTPATIENT
Start: 2019-01-23 | End: 2019-06-06 | Stop reason: SDUPTHER

## 2019-01-23 RX ORDER — OXYCODONE AND ACETAMINOPHEN 10; 325 MG/1; MG/1
1 TABLET ORAL EVERY 6 HOURS PRN
Qty: 40 TABLET | Refills: 0 | Status: SHIPPED | OUTPATIENT
Start: 2019-01-23 | End: 2019-02-25 | Stop reason: SDUPTHER

## 2019-01-24 ENCOUNTER — PATIENT MESSAGE (OUTPATIENT)
Dept: GASTROENTEROLOGY | Facility: CLINIC | Age: 39
End: 2019-01-24

## 2019-01-25 ENCOUNTER — PATIENT MESSAGE (OUTPATIENT)
Dept: UROLOGY | Facility: CLINIC | Age: 39
End: 2019-01-25

## 2019-01-25 ENCOUNTER — PATIENT MESSAGE (OUTPATIENT)
Dept: NEUROLOGY | Facility: CLINIC | Age: 39
End: 2019-01-25

## 2019-01-25 ENCOUNTER — TELEPHONE (OUTPATIENT)
Dept: NEUROLOGY | Facility: CLINIC | Age: 39
End: 2019-01-25

## 2019-01-25 NOTE — PROCEDURES
DATE OF STUDY:  01/16/2019    EEG NUMBER:  FH-.    REFERRING PHYSICIAN:  Ant Bourgeois M.D.    This EEG was performed to assess for evidence of underlying epilepsy.    ELECTROENCEPHALOGRAM REPORT    METHODOLOGY:  Electroencephalographic (EEG) recording is recorded with   electrodes placed according to the International 10-20 placement system.  Thirty   two (32) channels of digital signal (sampling rate of 512/sec), including T1   and T2, were simultaneously recorded from the scalp and may include EKG, EMG,   and/or eye monitors.  Recording band pass was 0.1 to 512 Hz.  Digital video   recording of the patient is simultaneously recorded with the EEG.  The patient   is instructed to report clinical symptoms which may occur during the recording   session.  EEG and video recording are stored and archived in digital format.    Activation procedures, which include photic stimulation, hyperventilation and   instructing patients to perform simple tasks, are done in selected patients.    The EEG is displayed on a monitor screen and can be reviewed using different   montages.  Computer assisted-analysis is employed to detect spike and   electrographic seizure activity.  The entire record is submitted for computer   analysis.  The entire recording is visually reviewed, and the times identified   by computer analysis as being spikes or seizures are reviewed again.    Compressed spectral analysis (CSA) is also performed on the activity recorded   from each individual channel.  This is displayed as a power display of   frequencies from 0 to 30 Hz over time.  The CSA is reviewed looking for   asymmetries in power between homologous areas of the scalp, then compared with   the original EEG recording.    Pharmapod software was also utilized in the review of this study.  This software   suite analyzes the EEG recording in multiple domains.  Coherence and rhythmicity   are computed to identify EEG sections which may contain  organized seizures.    Each channel undergoes analysis to detect the presence of spike and sharp waves   which have special and morphological characteristics of epileptic activity.  The   routine EEG recording is converted from special into frequency domain.  This is   then displayed comparing homologous areas to identify areas of significant   asymmetry.  Algorithm to identify non-cortically generated artifact is used to   separate artifact from the EEG.    EEG FINDINGS:  The recording was obtained with a number of standard bipolar and   referential montages during wakefulness, drowsiness and sleep.  In the alert   state, the posterior background rhythm was a symmetric, well-modulated 11 Hz to   12 Hz alpha rhythm, which reacted symmetrically to eye opening.  Activation   procedures were not performed.  During drowsiness, the background rhythm waxed   and waned and there were periods of slowing.  During stage II sleep, symmetric V   waves, K complexes and sleep spindles were noted.  There were no focal   abnormalities.  There were no interictal epileptiform abnormalities and no   clinical or electrographic seizures were recorded.    The EKG channel revealed sinus rhythm.  Of note, benign rhythmical theta range   activity was noted in the temporal regions during drowsiness and sleep.    The EKG channel revealed sinus rhythm.    IMPRESSION:  This is a normal EEG during wakefulness, drowsiness and sleep.    CLINICAL CORRELATION:  The patient is a 38-year-old female who is currently   maintained on clonazepam.  This is a normal EEG during wakefulness, drowsiness   and sleep.  There is no evidence for either cortical dysfunction nor an   epileptic process on this recording.  No seizures were recorded during this   study.  The excessive beta range activity is likely secondary to use of   benzodiazepine.      FAK/IN  dd: 01/25/2019 15:19:27 (CST)  td: 01/25/2019 15:30:53 (CST)  Doc ID   #[id number]  Job ID #[id number]    CC:

## 2019-01-26 ENCOUNTER — PATIENT MESSAGE (OUTPATIENT)
Dept: INTERNAL MEDICINE | Facility: CLINIC | Age: 39
End: 2019-01-26

## 2019-01-26 NOTE — TELEPHONE ENCOUNTER
----- Message from Fior Thompson sent at 1/24/2019  1:43 PM CST -----  Contact: self @ [phone]  Pt is scheduled for a Cons on 2-6-19.  Pt is calling to reschedule.  Pt says she is suppos to be in [place] on that day for a nursing test that she has already paid $200 for.  Pls call asap.

## 2019-01-26 NOTE — TELEPHONE ENCOUNTER
Left a message for the patient to let her know her appointment is rescheduled to 2/20 at 1:30. She was asked to call back and reschedule if this is not a good day and time for her.

## 2019-01-27 RX ORDER — CLINDAMYCIN PHOSPHATE 20 MG/G
CREAM VAGINAL NIGHTLY
Qty: 40 G | Refills: 0 | Status: SHIPPED | OUTPATIENT
Start: 2019-01-27 | End: 2019-04-15 | Stop reason: ALTCHOICE

## 2019-01-27 RX ORDER — CLINDAMYCIN PHOSPHATE 20 MG/G
CREAM VAGINAL NIGHTLY
Qty: 40 G | Refills: 0 | OUTPATIENT
Start: 2019-01-27

## 2019-01-29 ENCOUNTER — PATIENT MESSAGE (OUTPATIENT)
Dept: INTERNAL MEDICINE | Facility: CLINIC | Age: 39
End: 2019-01-29

## 2019-01-30 ENCOUNTER — PATIENT MESSAGE (OUTPATIENT)
Dept: INTERNAL MEDICINE | Facility: CLINIC | Age: 39
End: 2019-01-30

## 2019-01-30 RX ORDER — CLONAZEPAM 1 MG/1
1 TABLET ORAL 2 TIMES DAILY PRN
Qty: 60 TABLET | Refills: 0 | Status: SHIPPED | OUTPATIENT
Start: 2019-01-30 | End: 2019-03-01 | Stop reason: SDUPTHER

## 2019-01-30 RX ORDER — MIRTAZAPINE 30 MG/1
30 TABLET, ORALLY DISINTEGRATING ORAL NIGHTLY
Qty: 30 TABLET | Refills: 5 | Status: SHIPPED | OUTPATIENT
Start: 2019-01-30 | End: 2019-07-17 | Stop reason: SDUPTHER

## 2019-02-01 ENCOUNTER — PATIENT MESSAGE (OUTPATIENT)
Dept: INTERNAL MEDICINE | Facility: CLINIC | Age: 39
End: 2019-02-01

## 2019-02-01 DIAGNOSIS — F41.9 ANXIETY: ICD-10-CM

## 2019-02-01 RX ORDER — ZOLPIDEM TARTRATE 10 MG/1
TABLET ORAL
Qty: 30 TABLET | Refills: 0 | Status: SHIPPED | OUTPATIENT
Start: 2019-02-01 | End: 2019-03-01 | Stop reason: SDUPTHER

## 2019-02-05 ENCOUNTER — PATIENT MESSAGE (OUTPATIENT)
Dept: GASTROENTEROLOGY | Facility: CLINIC | Age: 39
End: 2019-02-05

## 2019-02-11 ENCOUNTER — PATIENT MESSAGE (OUTPATIENT)
Dept: NEUROLOGY | Facility: CLINIC | Age: 39
End: 2019-02-11

## 2019-02-21 ENCOUNTER — HOSPITAL ENCOUNTER (OUTPATIENT)
Dept: RADIOLOGY | Facility: HOSPITAL | Age: 39
Discharge: HOME | End: 2019-02-21
Attending: UROLOGY
Payer: MEDICAID

## 2019-02-21 DIAGNOSIS — N20.0 NEPHROLITHIASIS: ICD-10-CM

## 2019-02-21 PROCEDURE — 74018 RADEX ABDOMEN 1 VIEW: CPT | Mod: 26,,, | Performed by: RADIOLOGY

## 2019-02-21 PROCEDURE — 74018 XR ABDOMEN AP 1 VIEW: ICD-10-PCS | Mod: 26,,, | Performed by: RADIOLOGY

## 2019-02-21 PROCEDURE — 74018 RADEX ABDOMEN 1 VIEW: CPT | Mod: TC

## 2019-02-22 ENCOUNTER — PATIENT MESSAGE (OUTPATIENT)
Dept: GASTROENTEROLOGY | Facility: CLINIC | Age: 39
End: 2019-02-22

## 2019-02-25 ENCOUNTER — PATIENT MESSAGE (OUTPATIENT)
Dept: GASTROENTEROLOGY | Facility: CLINIC | Age: 39
End: 2019-02-25

## 2019-02-25 DIAGNOSIS — K50.819 CROHN'S DISEASE OF SMALL AND LARGE INTESTINES WITH COMPLICATION: ICD-10-CM

## 2019-02-25 RX ORDER — OXYCODONE AND ACETAMINOPHEN 10; 325 MG/1; MG/1
1 TABLET ORAL EVERY 6 HOURS PRN
Qty: 40 TABLET | Refills: 0 | Status: SHIPPED | OUTPATIENT
Start: 2019-02-25 | End: 2019-03-18 | Stop reason: SDUPTHER

## 2019-02-25 RX ORDER — DIPHENOXYLATE HYDROCHLORIDE AND ATROPINE SULFATE 2.5; .025 MG/1; MG/1
1 TABLET ORAL 4 TIMES DAILY PRN
Qty: 100 TABLET | Refills: 0 | Status: SHIPPED | OUTPATIENT
Start: 2019-02-25 | End: 2019-04-01 | Stop reason: SDUPTHER

## 2019-02-26 ENCOUNTER — TELEPHONE (OUTPATIENT)
Dept: GASTROENTEROLOGY | Facility: CLINIC | Age: 39
End: 2019-02-26

## 2019-02-26 RX ORDER — NYSTATIN 100000 [USP'U]/ML
4 SUSPENSION ORAL 4 TIMES DAILY
Qty: 160 ML | Refills: 0 | Status: SHIPPED | OUTPATIENT
Start: 2019-02-26 | End: 2019-03-08

## 2019-02-26 RX ORDER — FLUCONAZOLE 100 MG/1
100 TABLET ORAL DAILY
Qty: 10 TABLET | Refills: 0 | Status: SHIPPED | OUTPATIENT
Start: 2019-02-26 | End: 2019-03-08

## 2019-02-26 NOTE — TELEPHONE ENCOUNTER
----- Message from Parish Ross MD sent at 2/26/2019 10:44 AM CST -----  Contact: Mary Lou- [phone]  We could try diflucan for a week  ----- Message -----  From: Belinda Sullivan MA  Sent: 2/26/2019   9:36 AM  To: Parish Ross MD        ----- Message -----  From: Kamryn Vela  Sent: 2/26/2019   9:30 AM  To: Cody Ross- pharmacy called and stated the pts rxnystatin (MYCOSTATIN) 100,000 unit/mL suspension is on back order through the - please contact pharmacy at [phone]

## 2019-02-28 ENCOUNTER — INFUSION (OUTPATIENT)
Dept: INFUSION THERAPY | Facility: OTHER | Age: 39
End: 2019-02-28
Attending: INTERNAL MEDICINE
Payer: MEDICAID

## 2019-02-28 VITALS
RESPIRATION RATE: 18 BRPM | SYSTOLIC BLOOD PRESSURE: 130 MMHG | HEIGHT: 61 IN | WEIGHT: 107.13 LBS | BODY MASS INDEX: 20.22 KG/M2 | DIASTOLIC BLOOD PRESSURE: 82 MMHG | HEART RATE: 82 BPM | TEMPERATURE: 99 F | OXYGEN SATURATION: 100 %

## 2019-02-28 DIAGNOSIS — K50.019 CROHN'S DISEASE OF SMALL INTESTINE WITH COMPLICATION: Primary | ICD-10-CM

## 2019-02-28 PROCEDURE — 96361 HYDRATE IV INFUSION ADD-ON: CPT

## 2019-02-28 PROCEDURE — 25000003 PHARM REV CODE 250: Performed by: INTERNAL MEDICINE

## 2019-02-28 PROCEDURE — 96413 CHEMO IV INFUSION 1 HR: CPT

## 2019-02-28 PROCEDURE — 63600175 PHARM REV CODE 636 W HCPCS: Mod: JG | Performed by: INTERNAL MEDICINE

## 2019-02-28 PROCEDURE — 96365 THER/PROPH/DIAG IV INF INIT: CPT

## 2019-02-28 RX ORDER — HEPARIN 100 UNIT/ML
500 SYRINGE INTRAVENOUS
Status: COMPLETED | OUTPATIENT
Start: 2019-02-28 | End: 2019-02-28

## 2019-02-28 RX ORDER — SODIUM CHLORIDE 0.9 % (FLUSH) 0.9 %
10 SYRINGE (ML) INJECTION
Status: CANCELLED | OUTPATIENT
Start: 2019-02-28

## 2019-02-28 RX ORDER — HEPARIN 100 UNIT/ML
500 SYRINGE INTRAVENOUS
Status: CANCELLED | OUTPATIENT
Start: 2019-02-28

## 2019-02-28 RX ORDER — EPINEPHRINE 1 MG/ML
0.3 INJECTION, SOLUTION, CONCENTRATE INTRAVENOUS
Status: CANCELLED | OUTPATIENT
Start: 2019-02-28

## 2019-02-28 RX ORDER — IPRATROPIUM BROMIDE AND ALBUTEROL SULFATE 2.5; .5 MG/3ML; MG/3ML
3 SOLUTION RESPIRATORY (INHALATION)
Status: CANCELLED | OUTPATIENT
Start: 2019-02-28

## 2019-02-28 RX ORDER — METHYLPREDNISOLONE SOD SUCC 125 MG
40 VIAL (EA) INJECTION
Status: CANCELLED | OUTPATIENT
Start: 2019-02-28

## 2019-02-28 RX ORDER — HEPARIN 100 UNIT/ML
500 SYRINGE INTRAVENOUS
Status: DISCONTINUED | OUTPATIENT
Start: 2019-02-28 | End: 2019-02-28 | Stop reason: HOSPADM

## 2019-02-28 RX ORDER — DIPHENHYDRAMINE HYDROCHLORIDE 50 MG/ML
25 INJECTION INTRAMUSCULAR; INTRAVENOUS
Status: CANCELLED | OUTPATIENT
Start: 2019-02-28

## 2019-02-28 RX ORDER — ACETAMINOPHEN 325 MG/1
650 TABLET ORAL
Status: CANCELLED | OUTPATIENT
Start: 2019-02-28

## 2019-02-28 RX ORDER — SODIUM CHLORIDE 0.9 % (FLUSH) 0.9 %
10 SYRINGE (ML) INJECTION
Status: DISCONTINUED | OUTPATIENT
Start: 2019-02-28 | End: 2019-02-28 | Stop reason: HOSPADM

## 2019-02-28 RX ADMIN — VEDOLIZUMAB 300 MG: 300 INJECTION, POWDER, LYOPHILIZED, FOR SOLUTION INTRAVENOUS at 11:02

## 2019-02-28 RX ADMIN — HEPARIN 500 UNITS: 100 SYRINGE at 11:02

## 2019-02-28 RX ADMIN — SODIUM CHLORIDE: 0.9 INJECTION, SOLUTION INTRAVENOUS at 09:02

## 2019-02-28 RX ADMIN — SODIUM CHLORIDE: 0.9 INJECTION, SOLUTION INTRAVENOUS at 10:02

## 2019-02-28 NOTE — PLAN OF CARE
Problem: Adult Inpatient Plan of Care  Goal: Plan of Care Review  Outcome: Ongoing (interventions implemented as appropriate)  Pt tolerated IVF and Entyvio infusion without issue. VSS. AVS given. Pt d/c home with instructions to return 3/21/19.

## 2019-03-01 ENCOUNTER — PATIENT MESSAGE (OUTPATIENT)
Dept: INTERNAL MEDICINE | Facility: CLINIC | Age: 39
End: 2019-03-01

## 2019-03-01 DIAGNOSIS — F41.9 ANXIETY: ICD-10-CM

## 2019-03-01 RX ORDER — ZOLPIDEM TARTRATE 10 MG/1
TABLET ORAL
Qty: 30 TABLET | Refills: 0 | Status: SHIPPED | OUTPATIENT
Start: 2019-03-01 | End: 2019-04-01 | Stop reason: SDUPTHER

## 2019-03-01 RX ORDER — CLONAZEPAM 1 MG/1
1 TABLET ORAL 2 TIMES DAILY PRN
Qty: 60 TABLET | Refills: 0 | Status: SHIPPED | OUTPATIENT
Start: 2019-03-01 | End: 2019-04-01 | Stop reason: SDUPTHER

## 2019-03-06 DIAGNOSIS — Z79.899 ENCOUNTER FOR LONG-TERM (CURRENT) USE OF HIGH-RISK MEDICATION: ICD-10-CM

## 2019-03-06 DIAGNOSIS — K50.819 CROHN'S DISEASE OF BOTH SMALL AND LARGE INTESTINE WITH COMPLICATION: ICD-10-CM

## 2019-03-10 DIAGNOSIS — Z79.899 ENCOUNTER FOR LONG-TERM (CURRENT) USE OF HIGH-RISK MEDICATION: ICD-10-CM

## 2019-03-10 DIAGNOSIS — K50.819 CROHN'S DISEASE OF BOTH SMALL AND LARGE INTESTINE WITH COMPLICATION: ICD-10-CM

## 2019-03-10 RX ORDER — DRONABINOL 5 MG/1
CAPSULE ORAL
Qty: 60 CAPSULE | Refills: 0 | Status: SHIPPED | OUTPATIENT
Start: 2019-03-10 | End: 2019-03-28

## 2019-03-10 RX ORDER — DRONABINOL 5 MG/1
CAPSULE ORAL
Qty: 60 CAPSULE | Refills: 0 | Status: SHIPPED | OUTPATIENT
Start: 2019-03-10 | End: 2019-04-14 | Stop reason: SDUPTHER

## 2019-03-11 ENCOUNTER — PATIENT MESSAGE (OUTPATIENT)
Dept: GASTROENTEROLOGY | Facility: CLINIC | Age: 39
End: 2019-03-11

## 2019-03-14 ENCOUNTER — PATIENT MESSAGE (OUTPATIENT)
Dept: OBSTETRICS AND GYNECOLOGY | Facility: CLINIC | Age: 39
End: 2019-03-14

## 2019-03-14 ENCOUNTER — PATIENT MESSAGE (OUTPATIENT)
Dept: GASTROENTEROLOGY | Facility: CLINIC | Age: 39
End: 2019-03-14

## 2019-03-14 DIAGNOSIS — R10.2 PELVIC PAIN IN FEMALE: Primary | ICD-10-CM

## 2019-03-18 ENCOUNTER — OFFICE VISIT (OUTPATIENT)
Dept: GASTROENTEROLOGY | Facility: CLINIC | Age: 39
End: 2019-03-18
Payer: MEDICAID

## 2019-03-18 ENCOUNTER — PATIENT MESSAGE (OUTPATIENT)
Dept: OBSTETRICS AND GYNECOLOGY | Facility: CLINIC | Age: 39
End: 2019-03-18

## 2019-03-18 VITALS
DIASTOLIC BLOOD PRESSURE: 79 MMHG | SYSTOLIC BLOOD PRESSURE: 109 MMHG | BODY MASS INDEX: 20.69 KG/M2 | HEIGHT: 61 IN | HEART RATE: 79 BPM | WEIGHT: 109.56 LBS

## 2019-03-18 DIAGNOSIS — K50.019 CROHN'S DISEASE OF SMALL INTESTINE WITH COMPLICATION: Primary | Chronic | ICD-10-CM

## 2019-03-18 DIAGNOSIS — Z79.899 ENCOUNTER FOR LONG-TERM (CURRENT) USE OF HIGH-RISK MEDICATION: ICD-10-CM

## 2019-03-18 DIAGNOSIS — K50.819 CROHN'S DISEASE OF SMALL AND LARGE INTESTINES WITH COMPLICATION: ICD-10-CM

## 2019-03-18 DIAGNOSIS — Z93.2 S/P ILEOSTOMY: Chronic | ICD-10-CM

## 2019-03-18 PROCEDURE — 99213 OFFICE O/P EST LOW 20 MIN: CPT | Mod: PBBFAC | Performed by: INTERNAL MEDICINE

## 2019-03-18 PROCEDURE — 99215 PR OFFICE/OUTPT VISIT, EST, LEVL V, 40-54 MIN: ICD-10-PCS | Mod: S$PBB,,, | Performed by: INTERNAL MEDICINE

## 2019-03-18 PROCEDURE — 99999 PR PBB SHADOW E&M-EST. PATIENT-LVL III: CPT | Mod: PBBFAC,,, | Performed by: INTERNAL MEDICINE

## 2019-03-18 PROCEDURE — 99999 PR PBB SHADOW E&M-EST. PATIENT-LVL III: ICD-10-PCS | Mod: PBBFAC,,, | Performed by: INTERNAL MEDICINE

## 2019-03-18 PROCEDURE — 99215 OFFICE O/P EST HI 40 MIN: CPT | Mod: S$PBB,,, | Performed by: INTERNAL MEDICINE

## 2019-03-18 RX ORDER — OXYCODONE AND ACETAMINOPHEN 10; 325 MG/1; MG/1
1 TABLET ORAL EVERY 6 HOURS PRN
Qty: 40 TABLET | Refills: 0 | Status: SHIPPED | OUTPATIENT
Start: 2019-03-18 | End: 2019-04-18 | Stop reason: SDUPTHER

## 2019-03-18 NOTE — PROGRESS NOTES
Subjective:       Patient ID: Ivy Salgado is a 38 y.o. female.    Chief Complaint: Crohn's Disease    HPI  Review of Systems   Constitutional: Negative for activity change, appetite change, chills, diaphoresis, fatigue, fever and unexpected weight change.   HENT: Positive for congestion. Negative for ear pain, mouth sores, nosebleeds, postnasal drip, rhinorrhea, sinus pressure, sore throat, trouble swallowing and voice change.    Eyes: Negative for pain.   Respiratory: Negative for cough, shortness of breath and wheezing.    Cardiovascular: Negative for chest pain, palpitations and leg swelling.   Endocrine:        Flushing   Genitourinary: Positive for dysuria. Negative for difficulty urinating, flank pain, hematuria and menstrual problem.        Freq uti, but without dysuria   Musculoskeletal: Negative for arthralgias, back pain, gait problem, joint swelling, myalgias and neck pain.   Skin: Negative for rash.   Neurological: Negative for dizziness, tremors, syncope, numbness and headaches.   Hematological: Negative for adenopathy. Does not bruise/bleed easily.   Psychiatric/Behavioral: Negative for agitation, behavioral problems, confusion, decreased concentration and dysphoric mood. The patient is not nervous/anxious.        Objective:      Physical Exam   Constitutional: She is oriented to person, place, and time. She appears well-developed and well-nourished. No distress.   HENT:   Head: Normocephalic and atraumatic.   Right Ear: External ear normal.   Left Ear: External ear normal.   Nose: Nose normal.   Mouth/Throat: Oropharynx is clear and moist. No oropharyngeal exudate.   Eyes: Conjunctivae are normal. Pupils are equal, round, and reactive to light. No scleral icterus.   Neck: Normal range of motion. Neck supple. No thyromegaly present.   Cardiovascular: Normal rate, regular rhythm, normal heart sounds and intact distal pulses. Exam reveals no gallop.   No murmur heard.  Pulmonary/Chest: Effort normal  and breath sounds normal. She has no wheezes. She has no rales.   Abdominal: Soft. Normal appearance and bowel sounds are normal. She exhibits no shifting dullness, no distension, no fluid wave, no abdominal bruit and no mass. There is no hepatosplenomegaly. There is no tenderness.   Musculoskeletal: Normal range of motion. She exhibits no edema or tenderness.   Lymphadenopathy:     She has no cervical adenopathy.   Neurological: She is alert and oriented to person, place, and time. No cranial nerve deficit.   Skin: Skin is warm and dry. No rash noted.   Psychiatric: She has a normal mood and affect. Her behavior is normal. Judgment and thought content normal.       Assessment:       1. Crohn's disease of small intestine with complication    2. S/P ileostomy    3. Encounter for long-term (current) use of high-risk medication    4. Crohn's disease of small and large intestines with complication        Plan:         This is a followup visit for Ivy.  She is a 38-year-old woman with Crohn   disease.  She has had multiple surgeries including total abdominal colectomy and   ileostomy.  She has been through numerous medications as noted in my past   notes.  Last fall, I changed from Stelara to Entyvio.  We had documented despite   Stelara that an MRI showed some ileal thickening.  It was not overwhelmingly   conclusively positive for Crohn's, but she was having symptoms that were steroid   responsive, and with that in mind, we started Entyvio.  She has now been on   Entyvio for about five months.    Overall, her symptoms are about the same as they had been.  She has abdominal   pain, which is generalized and sometimes stabbing in the left upper quadrant.    The abdominal pain is postprandial.  It is worse after spicy food.  She   continues with a high output from her ileostomy.  This has been a continuous   problem and she requires ongoing use of Imodium and Lomotil just to keep from   getting dehydrated.  She has  occasional reflux and Zantac helps.  She has   dysphagia for dry food.  No problems with liquid or soft food.  One thing that   has improved is appetite.  She has been able to gain some weight.  She thinks   the Marinol has helped her quite a bit on that.    SOCIAL HISTORY:  Doing Boost every day.  This seems to help.    REVIEW OF SYSTEMS:  She has had some flushing.  She is concerned about   lymphadenopathy in the left axilla and she has had some congestion from a cold.    She has also been admitted for a UTI, although she really just presented with   tachycardia and sepsis symptoms.    ASSESSMENT:  1.  Crohn disease:  Right now, we are using Entyvio.  She seems to be not   tolerating the Entyvio infusions that well.  Each time after Entyvio infusion,   she seems to get UTI and I do not know what the correlation would be.  There has   been no change in her abdominal pain or diarrhea.  I do plan on following up an   MR enterography this spring and I will consider EGD and ileoscopy later on.  2.  Status post ileostomy:  Unfortunately, she continues with the high output.    I do not know what else I can do other than continue the Imodium and Lomotil.  3.  Long-term use of high-risk medicine:  We reviewed her use of opiates.  She   in general has a fairly low, but stable use of the pain medicines using some   days zero, some days one and some days two depending on her abdominal pain.  She   is using 40 pain pills per month.  There has been no escalation of that use.    She feels like from a functional standpoint she is doing well.  She is working   one night a week and then going to classes to get a degree or certification   hopefully within a year.  So, it seems like she is functioning well.  I did the   opiate risk score and she is low at 2.  She does take anxiety medicines.  I had   requested a Psychiatry appointment with Dr. Romero to help, but apparently   because of her Medicaid, our Psychiatry Department will  not see her and also   unfortunately I do not think any psychiatrist or pain specialist will see her   because of her Medicaid.  I did query the  today and I gave her a   prescription to be filled later on this week at one month.    Forty-minute appointment, greater than half time face-to-face counseling.      FAUSTO  dd: 03/18/2019 10:37:52 (CDT)  td: 03/19/2019 06:52:28 (CDT)  Doc ID   #[id number]  Job ID #[id number]    CC:

## 2019-03-19 ENCOUNTER — TELEPHONE (OUTPATIENT)
Dept: INTERNAL MEDICINE | Facility: CLINIC | Age: 39
End: 2019-03-19

## 2019-03-19 ENCOUNTER — PATIENT MESSAGE (OUTPATIENT)
Dept: OBSTETRICS AND GYNECOLOGY | Facility: CLINIC | Age: 39
End: 2019-03-19

## 2019-03-19 ENCOUNTER — OFFICE VISIT (OUTPATIENT)
Dept: INTERNAL MEDICINE | Facility: CLINIC | Age: 39
End: 2019-03-19
Attending: FAMILY MEDICINE
Payer: MEDICAID

## 2019-03-19 ENCOUNTER — HOSPITAL ENCOUNTER (OUTPATIENT)
Dept: RADIOLOGY | Facility: OTHER | Age: 39
Discharge: HOME | End: 2019-03-19
Attending: OBSTETRICS & GYNECOLOGY
Payer: MEDICAID

## 2019-03-19 ENCOUNTER — HOSPITAL ENCOUNTER (OUTPATIENT)
Dept: RADIOLOGY | Facility: OTHER | Age: 39
Discharge: HOME | End: 2019-03-19
Attending: FAMILY MEDICINE
Payer: MEDICAID

## 2019-03-19 ENCOUNTER — PATIENT MESSAGE (OUTPATIENT)
Dept: INTERNAL MEDICINE | Facility: CLINIC | Age: 39
End: 2019-03-19

## 2019-03-19 VITALS
BODY MASS INDEX: 20.77 KG/M2 | HEIGHT: 61 IN | HEART RATE: 76 BPM | OXYGEN SATURATION: 100 % | SYSTOLIC BLOOD PRESSURE: 110 MMHG | WEIGHT: 110 LBS | DIASTOLIC BLOOD PRESSURE: 74 MMHG

## 2019-03-19 DIAGNOSIS — S61.411A LACERATION OF RIGHT HAND WITHOUT FOREIGN BODY, INITIAL ENCOUNTER: Primary | ICD-10-CM

## 2019-03-19 DIAGNOSIS — K50.019 CROHN'S DISEASE OF SMALL INTESTINE WITH COMPLICATION: ICD-10-CM

## 2019-03-19 DIAGNOSIS — S61.411A LACERATION OF RIGHT HAND WITHOUT FOREIGN BODY, INITIAL ENCOUNTER: ICD-10-CM

## 2019-03-19 DIAGNOSIS — R10.2 PELVIC PAIN IN FEMALE: ICD-10-CM

## 2019-03-19 PROCEDURE — 76830 TRANSVAGINAL US NON-OB: CPT | Mod: TC

## 2019-03-19 PROCEDURE — 76830 US PELVIS COMP WITH TRANSVAG NON-OB (XPD): ICD-10-PCS | Mod: 26,,, | Performed by: RADIOLOGY

## 2019-03-19 PROCEDURE — 99999 PR PBB SHADOW E&M-EST. PATIENT-LVL III: CPT | Mod: PBBFAC,,, | Performed by: FAMILY MEDICINE

## 2019-03-19 PROCEDURE — 76830 TRANSVAGINAL US NON-OB: CPT | Mod: 26,,, | Performed by: RADIOLOGY

## 2019-03-19 PROCEDURE — 99999 PR PBB SHADOW E&M-EST. PATIENT-LVL III: ICD-10-PCS | Mod: PBBFAC,,, | Performed by: FAMILY MEDICINE

## 2019-03-19 PROCEDURE — 99213 OFFICE O/P EST LOW 20 MIN: CPT | Mod: PBBFAC,25 | Performed by: FAMILY MEDICINE

## 2019-03-19 PROCEDURE — 73130 XR HAND COMPLETE 3 VIEW RIGHT: ICD-10-PCS | Mod: 26,RT,, | Performed by: RADIOLOGY

## 2019-03-19 PROCEDURE — 99214 PR OFFICE/OUTPT VISIT, EST, LEVL IV, 30-39 MIN: ICD-10-PCS | Mod: S$PBB,,, | Performed by: FAMILY MEDICINE

## 2019-03-19 PROCEDURE — 73130 X-RAY EXAM OF HAND: CPT | Mod: TC,FY,RT

## 2019-03-19 PROCEDURE — 99214 OFFICE O/P EST MOD 30 MIN: CPT | Mod: S$PBB,,, | Performed by: FAMILY MEDICINE

## 2019-03-19 PROCEDURE — 73130 X-RAY EXAM OF HAND: CPT | Mod: 26,RT,, | Performed by: RADIOLOGY

## 2019-03-19 PROCEDURE — 76856 US EXAM PELVIC COMPLETE: CPT | Mod: 26,,, | Performed by: RADIOLOGY

## 2019-03-19 PROCEDURE — 76856 US PELVIS COMP WITH TRANSVAG NON-OB (XPD): ICD-10-PCS | Mod: 26,,, | Performed by: RADIOLOGY

## 2019-03-19 NOTE — PROGRESS NOTES
CHIEF COMPLAINT:  Right hand laceration    HISTORY OF PRESENT ILLNESS: The patient presents with a painful right palmar hand laceration due to a crystal glass.  There is no erythema or pus reported.    She has a long complicated history of Crohn's disease with multiple complications.  She has already arranged for a 2 L normal saline infusion to be done tomorrow.  She would like to try to treat this pyelonephritis as an outpatient.    REVIEW OF SYSTEMS:  GENERAL: No fatigability or weight loss.  SKIN: No rashes, itching or changes in color or texture of skin.  Except as mentioned above  HEAD: No headaches or recent head trauma.  EYES: Visual acuity fine. No photophobia, ocular pain or diplopia.  EARS: Denies ear pain, discharge or vertigo.  NOSE: No loss of smell, no epistaxis or postnasal drip.  MOUTH & THROAT: No hoarseness or change in voice. No excessive gum bleeding.  NODES: Denies swollen glands.  CHEST: Denies BABCOCK, cyanosis, wheezing, cough and sputum production.  CARDIOVASCULAR: Denies chest pain, PND, orthopnea or reduced exercise tolerance.  ABDOMEN:  No weight loss. Denies diarrhea, abdominal pain, hematemesis or blood in stool.  URINARY: She has both flank pain and hematuria.  PERIPHERAL VASCULAR: No claudication or cyanosis.  MUSCULOSKELETAL: No joint stiffness or swelling.   NEUROLOGIC: No history of seizures, paralysis, alteration of gait or coordination.    PHYSICAL EXAMINATION:   APPEARANCE: Well nourished, well developed, in no acute distress.    SKIN:  There is a superficial 5 mm laceration located approximately group home between the CMC joint and the MCP joint along the ray of the little finger.  No evidence of foreign body or infection  HEAD: Normocephalic, atraumatic.  EYES: PERRL. EOMI.  Conjunctivae without injection and  anicteric  EARS: TM's intact. Light reflex normal. No retraction or perforation.    NOSE: Mucosa pink. Airway clear.  MOUTH & THROAT: No tonsillar enlargement. No pharyngeal  erythema or exudate. No stridor.  NECK: Supple.   NODES: No cervical, axillary or inguinal lymph node enlargement.  ABDOMEN: Bowel sounds normal. Not distended. Soft. No tenderness or masses.  No ascites is noted.  MUSCULOSKELETAL:  There is no clubbing, cyanosis, or edema of the extremities x4.  There is full range of motion of the lumbar spine.  There is full range of motion of the extremities x4.  There is no deformity noted.    NEUROLOGIC:       Normal speech development.      Hearing normal.      Normal gait.      Motor and sensory exams grossly normal.      DTR's normal.  PSYCHIATRIC: Patient is alert and oriented x3.  Thought processes are all normal.  There is no homicidality.  There is no suicidality.  There is no evidence of psychosis.    LABORATORY/RADIOLOGY: Chart reviewed.    ASSESSMENT:   Right hand laceration rule out foreign body  Complicated inflammatory bowel disease    PLAN:  Follow-up hand x-ray

## 2019-03-19 NOTE — TELEPHONE ENCOUNTER
----- Message from Salvatore Del Rosario MD sent at 3/19/2019  4:06 PM CDT -----  Hand x-ray does not demonstrate a foreign body.

## 2019-03-20 ENCOUNTER — PATIENT MESSAGE (OUTPATIENT)
Dept: OBSTETRICS AND GYNECOLOGY | Facility: CLINIC | Age: 39
End: 2019-03-20

## 2019-03-21 ENCOUNTER — TELEPHONE (OUTPATIENT)
Dept: GYNECOLOGIC ONCOLOGY | Facility: CLINIC | Age: 39
End: 2019-03-21

## 2019-03-21 DIAGNOSIS — N99.83 OVARIAN REMNANT SYNDROME: Primary | ICD-10-CM

## 2019-03-22 ENCOUNTER — PATIENT MESSAGE (OUTPATIENT)
Dept: ADMINISTRATIVE | Facility: OTHER | Age: 39
End: 2019-03-22

## 2019-03-22 ENCOUNTER — TELEPHONE (OUTPATIENT)
Dept: OBSTETRICS AND GYNECOLOGY | Facility: CLINIC | Age: 39
End: 2019-03-22

## 2019-03-22 NOTE — TELEPHONE ENCOUNTER
Went to send rx and patient has a pain contract with Dr. Ross. All rx must go through [facility]. It looks like he gave her a printed rx to fill dated 3/18/19 for 40 Oxycodone with acetaminophen quantity of 40 so she will need to fill that rx to use for her pain.

## 2019-03-22 NOTE — TELEPHONE ENCOUNTER
Patient is ok with prescription for pain medication to be sent to [facility].    Please send Rx

## 2019-03-22 NOTE — TELEPHONE ENCOUNTER
----- Message from Luz Canseco sent at 3/22/2019  9:55 AM CDT -----  Contact: self, [phone] (M)  Patient states she is having severe pelvic pain and GYN Oncology appointment is not until 4/17. Please advise.

## 2019-03-22 NOTE — TELEPHONE ENCOUNTER
Patient notified she will have to contact her GI physician that she has the pain contract with; but that also since she recently received a large quantity of Percocet on 3/18 we cannot call any other narcotics at this time.  Patient states she wasn't sure if  was going to send some thing different.  Notified because of the Opioid Epidemic we have to be very cautious. Patient states she fully understands.

## 2019-03-25 RX ORDER — VALACYCLOVIR HYDROCHLORIDE 500 MG/1
TABLET, FILM COATED ORAL
Qty: 90 TABLET | Refills: 2 | Status: SHIPPED | OUTPATIENT
Start: 2019-03-25 | End: 2019-12-13 | Stop reason: SDUPTHER

## 2019-03-28 ENCOUNTER — INFUSION (OUTPATIENT)
Dept: INFUSION THERAPY | Facility: OTHER | Age: 39
End: 2019-03-28
Attending: INTERNAL MEDICINE
Payer: MEDICAID

## 2019-03-28 ENCOUNTER — OFFICE VISIT (OUTPATIENT)
Dept: UROLOGY | Facility: CLINIC | Age: 39
End: 2019-03-28
Attending: UROLOGY
Payer: MEDICAID

## 2019-03-28 VITALS
DIASTOLIC BLOOD PRESSURE: 81 MMHG | HEART RATE: 88 BPM | BODY MASS INDEX: 21.31 KG/M2 | SYSTOLIC BLOOD PRESSURE: 155 MMHG | RESPIRATION RATE: 18 BRPM | TEMPERATURE: 99 F | HEIGHT: 61 IN | WEIGHT: 112.88 LBS | OXYGEN SATURATION: 99 %

## 2019-03-28 VITALS
SYSTOLIC BLOOD PRESSURE: 136 MMHG | WEIGHT: 112.88 LBS | HEART RATE: 87 BPM | BODY MASS INDEX: 21.31 KG/M2 | HEIGHT: 61 IN | DIASTOLIC BLOOD PRESSURE: 72 MMHG

## 2019-03-28 DIAGNOSIS — K50.019 CROHN'S DISEASE OF SMALL INTESTINE WITH COMPLICATION: Primary | ICD-10-CM

## 2019-03-28 DIAGNOSIS — N39.0 RECURRENT UTI: Primary | ICD-10-CM

## 2019-03-28 PROCEDURE — 63600175 PHARM REV CODE 636 W HCPCS: Performed by: INTERNAL MEDICINE

## 2019-03-28 PROCEDURE — 25000003 PHARM REV CODE 250: Performed by: INTERNAL MEDICINE

## 2019-03-28 PROCEDURE — 99214 PR OFFICE/OUTPT VISIT, EST, LEVL IV, 30-39 MIN: ICD-10-PCS | Mod: S$GLB,,, | Performed by: UROLOGY

## 2019-03-28 PROCEDURE — 96361 HYDRATE IV INFUSION ADD-ON: CPT

## 2019-03-28 PROCEDURE — 96360 HYDRATION IV INFUSION INIT: CPT

## 2019-03-28 PROCEDURE — 99214 OFFICE O/P EST MOD 30 MIN: CPT | Mod: S$GLB,,, | Performed by: UROLOGY

## 2019-03-28 RX ORDER — FLUCONAZOLE 150 MG/1
TABLET ORAL
Refills: 4 | COMMUNITY
Start: 2019-02-22 | End: 2019-05-28

## 2019-03-28 RX ORDER — HEPARIN 100 UNIT/ML
500 SYRINGE INTRAVENOUS
Status: COMPLETED | OUTPATIENT
Start: 2019-03-28 | End: 2019-03-28

## 2019-03-28 RX ORDER — HEPARIN 100 UNIT/ML
500 SYRINGE INTRAVENOUS
Status: CANCELLED | OUTPATIENT
Start: 2019-04-11

## 2019-03-28 RX ORDER — POTASSIUM CITRATE 15 MEQ/1
2 TABLET, EXTENDED RELEASE ORAL 2 TIMES DAILY
Qty: 360 TABLET | Refills: 3
Start: 2019-03-28 | End: 2021-05-25 | Stop reason: SDUPTHER

## 2019-03-28 RX ORDER — HEPARIN 100 UNIT/ML
500 SYRINGE INTRAVENOUS
Status: CANCELLED | OUTPATIENT
Start: 2019-05-15

## 2019-03-28 RX ADMIN — SODIUM CHLORIDE: 0.9 INJECTION, SOLUTION INTRAVENOUS at 09:03

## 2019-03-28 RX ADMIN — SODIUM CHLORIDE: 0.9 INJECTION, SOLUTION INTRAVENOUS at 10:03

## 2019-03-28 RX ADMIN — HEPARIN SODIUM (PORCINE) LOCK FLUSH IV SOLN 100 UNIT/ML 500 UNITS: 100 SOLUTION at 11:03

## 2019-03-28 NOTE — PROGRESS NOTES
"Subjective:      Ivy Salgado is a 38 y.o. female who returns today regarding her UTIs.    Full history detailed in prior notes.    She has had no UTIs since starting Keflex prophylaxis id January.  Prior to this she had had 3 UTIs within 2-3 months; these started after starting a new biologic medication for her Crohn's disease, which she is still receiving. Prior to that she had gone several months without UTI.     Today she also reports that her gynecologist as recommended she have a nephrectomy.  She has previously had a hysterectomy.  She is scheduled to see Dr. German to discuss this.  Her gynecologist indicated that she may need ureteral stents at the time of surgery.    The following portions of the patient's history were reviewed and updated as appropriate: allergies, current medications, past family history, past medical history, past social history, past surgical history and problem list.    Review of Systems  A comprehensive multipoint review of systems was negative except as otherwise stated in the HPI.     Objective:   Vitals: /72 (BP Location: Left arm, Patient Position: Sitting, BP Method: Large (Automatic))   Pulse 87   Ht 5' 1" (1.549 m)   Wt 51.2 kg (112 lb 14 oz)   BMI 21.33 kg/m²     Physical Exam   General: alert and oriented, no acute distress  Respiratory: Symmetric expansion, non-labored breathing  Neuro: no gross deficits  Psych: normal judgment and insight, normal mood/affect and non-anxious    Lab Review     Lab Results   Component Value Date    WBC 4.49 01/16/2019    HGB 9.9 (L) 01/16/2019    HCT 31.4 (L) 01/16/2019    MCV 89 01/16/2019     01/16/2019     Lab Results   Component Value Date    CREATININE 0.7 01/16/2019    BUN 7 01/16/2019     Date: 11/2/17 -- Vol 0.50 L, Ca 95 mg/day, Ox 11 mg/day, Cit 132 mg/day, pH 5.319, Na 21, K 20, Mg 18, Cr 796     Imaging   Results for orders placed during the hospital encounter of 09/13/18   X-Ray Abdomen AP 1 View    Narrative " EXAMINATION:  XR ABDOMEN AP 1 VIEW    CLINICAL HISTORY:  Calculus of kidney    TECHNIQUE:  Single AP View of the abdomen was performed.    COMPARISON:  CT 02/16/2018    FINDINGS:  There is ostomy projecting over the right lower abdomen with multiple extrinsic monitoring leads overlying the abdomen and pelvis.    Subcentimeter hyperdensity projects over the lower pole of the left renal shadow partially obscured by extrinsic leads concerning for renal calculus as identified on CT.  Few scattered gas-filled loops of bowel within the abdomen and pelvis overall nonspecific bowel gas pattern.      Impression Please see above      Electronically signed by: Samson Duron DO  Date:    09/13/2018  Time:    10:26       Assessment and Plan:   1. History of recurrent UTIs  -- Continue prophylactic abx (Keflex 250 daily) while continuing Crohn's treatment    2. Nephrolithiasis  -- Minimal stone burden on recent CT  -- Unlikely contributing to UTIs so will avoid treatment for now  -- Continue UroCit K 30 mEq BID    3. Low urine volume   4. Hypocitraturia  5. Low urine pH  -- Previously addressed    6.  Possible need for ureteral stents  -- Will defer to Dr. German regarding whether these are needed      FU as scheduled

## 2019-03-28 NOTE — PLAN OF CARE
Problem: Adult Inpatient Plan of Care  Goal: Plan of Care Review  Outcome: Ongoing (interventions implemented as appropriate)  IVF to left chest port complete. Pt tolerated well. VSS. NAD. Port to left chest de-accessed after heparinized per protocol.  AVS provided. Pt verbalized understanding of discharge instructions before leaving with self..

## 2019-04-01 DIAGNOSIS — F41.1 GENERALIZED ANXIETY DISORDER: Primary | ICD-10-CM

## 2019-04-01 DIAGNOSIS — F41.9 ANXIETY: ICD-10-CM

## 2019-04-01 DIAGNOSIS — K50.819 CROHN'S DISEASE OF SMALL AND LARGE INTESTINES WITH COMPLICATION: ICD-10-CM

## 2019-04-01 RX ORDER — CLONAZEPAM 1 MG/1
1 TABLET ORAL 2 TIMES DAILY PRN
Qty: 60 TABLET | Refills: 0 | Status: SHIPPED | OUTPATIENT
Start: 2019-04-01 | End: 2019-04-30 | Stop reason: SDUPTHER

## 2019-04-01 RX ORDER — DIPHENOXYLATE HYDROCHLORIDE AND ATROPINE SULFATE 2.5; .025 MG/1; MG/1
1 TABLET ORAL 4 TIMES DAILY PRN
Qty: 100 TABLET | Refills: 0 | Status: SHIPPED | OUTPATIENT
Start: 2019-04-01 | End: 2019-04-30 | Stop reason: SDUPTHER

## 2019-04-01 RX ORDER — DIPHENOXYLATE HYDROCHLORIDE AND ATROPINE SULFATE 2.5; .025 MG/1; MG/1
1 TABLET ORAL 4 TIMES DAILY PRN
Qty: 100 TABLET | Refills: 0 | Status: SHIPPED | OUTPATIENT
Start: 2019-04-01 | End: 2019-04-01

## 2019-04-01 RX ORDER — ZOLPIDEM TARTRATE 10 MG/1
TABLET ORAL
Qty: 30 TABLET | Refills: 0 | Status: SHIPPED | OUTPATIENT
Start: 2019-04-01 | End: 2019-04-30 | Stop reason: SDUPTHER

## 2019-04-07 ENCOUNTER — PATIENT MESSAGE (OUTPATIENT)
Dept: GYNECOLOGIC ONCOLOGY | Facility: CLINIC | Age: 39
End: 2019-04-07

## 2019-04-09 ENCOUNTER — HOSPITAL ENCOUNTER (EMERGENCY)
Facility: OTHER | Age: 39
Discharge: HOME | End: 2019-04-09
Attending: EMERGENCY MEDICINE
Payer: MEDICAID

## 2019-04-09 VITALS
SYSTOLIC BLOOD PRESSURE: 109 MMHG | HEIGHT: 61 IN | BODY MASS INDEX: 21.14 KG/M2 | TEMPERATURE: 98 F | WEIGHT: 112 LBS | OXYGEN SATURATION: 98 % | DIASTOLIC BLOOD PRESSURE: 65 MMHG | RESPIRATION RATE: 19 BRPM | HEART RATE: 86 BPM

## 2019-04-09 DIAGNOSIS — R00.2 PALPITATIONS: ICD-10-CM

## 2019-04-09 DIAGNOSIS — R10.30 LOWER ABDOMINAL PAIN: Primary | ICD-10-CM

## 2019-04-09 DIAGNOSIS — N83.202 CYST OF LEFT OVARY: ICD-10-CM

## 2019-04-09 LAB
ALBUMIN SERPL BCP-MCNC: 4.1 G/DL (ref 3.5–5.2)
ALP SERPL-CCNC: 136 U/L (ref 55–135)
ALT SERPL W/O P-5'-P-CCNC: 26 U/L (ref 10–44)
ANION GAP SERPL CALC-SCNC: 8 MMOL/L (ref 8–16)
AST SERPL-CCNC: 28 U/L (ref 10–40)
B-HCG UR QL: NEGATIVE
BASOPHILS # BLD AUTO: 0.01 K/UL (ref 0–0.2)
BASOPHILS NFR BLD: 0.1 % (ref 0–1.9)
BILIRUB SERPL-MCNC: 0.3 MG/DL (ref 0.1–1)
BILIRUB UR QL STRIP: NEGATIVE
BUN SERPL-MCNC: 9 MG/DL (ref 6–20)
CALCIUM SERPL-MCNC: 9.5 MG/DL (ref 8.7–10.5)
CHLORIDE SERPL-SCNC: 104 MMOL/L (ref 95–110)
CLARITY UR: CLEAR
CO2 SERPL-SCNC: 25 MMOL/L (ref 23–29)
COLOR UR: YELLOW
CREAT SERPL-MCNC: 0.8 MG/DL (ref 0.5–1.4)
CTP QC/QA: YES
DIFFERENTIAL METHOD: NORMAL
EOSINOPHIL # BLD AUTO: 0 K/UL (ref 0–0.5)
EOSINOPHIL NFR BLD: 0.1 % (ref 0–8)
ERYTHROCYTE [DISTWIDTH] IN BLOOD BY AUTOMATED COUNT: 14.2 % (ref 11.5–14.5)
EST. GFR  (AFRICAN AMERICAN): >60 ML/MIN/1.73 M^2
EST. GFR  (NON AFRICAN AMERICAN): >60 ML/MIN/1.73 M^2
GLUCOSE SERPL-MCNC: 105 MG/DL (ref 70–110)
GLUCOSE UR QL STRIP: NEGATIVE
HCT VFR BLD AUTO: 38.1 % (ref 37–48.5)
HGB BLD-MCNC: 12.5 G/DL (ref 12–16)
HGB UR QL STRIP: NEGATIVE
KETONES UR QL STRIP: NEGATIVE
LEUKOCYTE ESTERASE UR QL STRIP: NEGATIVE
LIPASE SERPL-CCNC: 24 U/L (ref 4–60)
LYMPHOCYTES # BLD AUTO: 1.9 K/UL (ref 1–4.8)
LYMPHOCYTES NFR BLD: 27.3 % (ref 18–48)
MCH RBC QN AUTO: 28.4 PG (ref 27–31)
MCHC RBC AUTO-ENTMCNC: 32.8 G/DL (ref 32–36)
MCV RBC AUTO: 87 FL (ref 82–98)
MONOCYTES # BLD AUTO: 0.5 K/UL (ref 0.3–1)
MONOCYTES NFR BLD: 6.5 % (ref 4–15)
NEUTROPHILS # BLD AUTO: 4.7 K/UL (ref 1.8–7.7)
NEUTROPHILS NFR BLD: 65.9 % (ref 38–73)
NITRITE UR QL STRIP: NEGATIVE
PH UR STRIP: 6 [PH] (ref 5–8)
PLATELET # BLD AUTO: 304 K/UL (ref 150–350)
PMV BLD AUTO: 9.7 FL (ref 9.2–12.9)
POTASSIUM SERPL-SCNC: 3.8 MMOL/L (ref 3.5–5.1)
PROT SERPL-MCNC: 8.1 G/DL (ref 6–8.4)
PROT UR QL STRIP: NEGATIVE
RBC # BLD AUTO: 4.4 M/UL (ref 4–5.4)
SODIUM SERPL-SCNC: 137 MMOL/L (ref 136–145)
SP GR UR STRIP: <=1.005 (ref 1–1.03)
URN SPEC COLLECT METH UR: ABNORMAL
UROBILINOGEN UR STRIP-ACNC: NEGATIVE EU/DL
WBC # BLD AUTO: 7.1 K/UL (ref 3.9–12.7)

## 2019-04-09 PROCEDURE — 96361 HYDRATE IV INFUSION ADD-ON: CPT

## 2019-04-09 PROCEDURE — 81003 URINALYSIS AUTO W/O SCOPE: CPT

## 2019-04-09 PROCEDURE — 85025 COMPLETE CBC W/AUTO DIFF WBC: CPT

## 2019-04-09 PROCEDURE — 99285 EMERGENCY DEPT VISIT HI MDM: CPT | Mod: 25

## 2019-04-09 PROCEDURE — 63600175 PHARM REV CODE 636 W HCPCS: Performed by: EMERGENCY MEDICINE

## 2019-04-09 PROCEDURE — 80053 COMPREHEN METABOLIC PANEL: CPT

## 2019-04-09 PROCEDURE — 96375 TX/PRO/DX INJ NEW DRUG ADDON: CPT

## 2019-04-09 PROCEDURE — 96376 TX/PRO/DX INJ SAME DRUG ADON: CPT

## 2019-04-09 PROCEDURE — 93005 ELECTROCARDIOGRAM TRACING: CPT

## 2019-04-09 PROCEDURE — 25000003 PHARM REV CODE 250: Performed by: EMERGENCY MEDICINE

## 2019-04-09 PROCEDURE — 96374 THER/PROPH/DIAG INJ IV PUSH: CPT

## 2019-04-09 PROCEDURE — 83690 ASSAY OF LIPASE: CPT

## 2019-04-09 PROCEDURE — 93010 EKG 12-LEAD: ICD-10-PCS | Mod: ,,, | Performed by: INTERNAL MEDICINE

## 2019-04-09 PROCEDURE — 81025 URINE PREGNANCY TEST: CPT | Performed by: EMERGENCY MEDICINE

## 2019-04-09 PROCEDURE — 93010 ELECTROCARDIOGRAM REPORT: CPT | Mod: ,,, | Performed by: INTERNAL MEDICINE

## 2019-04-09 RX ORDER — HYDROMORPHONE HYDROCHLORIDE 2 MG/ML
0.5 INJECTION, SOLUTION INTRAMUSCULAR; INTRAVENOUS; SUBCUTANEOUS
Status: COMPLETED | OUTPATIENT
Start: 2019-04-09 | End: 2019-04-09

## 2019-04-09 RX ORDER — HYDROMORPHONE HYDROCHLORIDE 1 MG/ML
0.5 INJECTION, SOLUTION INTRAMUSCULAR; INTRAVENOUS; SUBCUTANEOUS
Status: COMPLETED | OUTPATIENT
Start: 2019-04-09 | End: 2019-04-09

## 2019-04-09 RX ORDER — KETOROLAC TROMETHAMINE 30 MG/ML
15 INJECTION, SOLUTION INTRAMUSCULAR; INTRAVENOUS
Status: COMPLETED | OUTPATIENT
Start: 2019-04-09 | End: 2019-04-09

## 2019-04-09 RX ORDER — HEPARIN SODIUM (PORCINE) LOCK FLUSH IV SOLN 100 UNIT/ML 100 UNIT/ML
5 SOLUTION INTRAVENOUS
Status: COMPLETED | OUTPATIENT
Start: 2019-04-09 | End: 2019-04-09

## 2019-04-09 RX ADMIN — HYDROMORPHONE HYDROCHLORIDE 0.5 MG: 2 INJECTION INTRAMUSCULAR; INTRAVENOUS; SUBCUTANEOUS at 06:04

## 2019-04-09 RX ADMIN — SODIUM CHLORIDE 1000 ML: 0.9 INJECTION, SOLUTION INTRAVENOUS at 05:04

## 2019-04-09 RX ADMIN — HEPARIN SODIUM (PORCINE) LOCK FLUSH IV SOLN 100 UNIT/ML 500 UNITS: 100 SOLUTION at 08:04

## 2019-04-09 RX ADMIN — KETOROLAC TROMETHAMINE 15 MG: 30 INJECTION, SOLUTION INTRAMUSCULAR at 07:04

## 2019-04-09 RX ADMIN — HYDROMORPHONE HYDROCHLORIDE 0.5 MG: 1 INJECTION, SOLUTION INTRAMUSCULAR; INTRAVENOUS; SUBCUTANEOUS at 05:04

## 2019-04-09 NOTE — ED TRIAGE NOTES
Pt reports abd distention x 2 days. ileostomy to RLQ. Pt has hx of chron's. Reports normal BM. Reports 3 uterine masses. Pt is concerned at the rate her abd is distending. Denies any fever. Denies any vaginal discharge. Reports pain is intensitifying and is unrelieved with her oral medications.

## 2019-04-09 NOTE — ED PROVIDER NOTES
Encounter Date: 4/9/2019    SCRIBE #1 NOTE: I, Davis Gill, am scribing for, and in the presence of, Dr. Pennington.       History     Chief Complaint   Patient presents with    Abdominal Pain     with distention x3 days pta, reports hx of crohns disease with ileostomy in place. denies urinary symptoms, denies vaginal bleeding or discharge.      Seen by provider: 4:42 PM    Patient is a 38 y.o. female who presents to the ED with complaint of left lower abdominal fullness and discomfort which started several days ago. She has a history of crohn's disease with an ostomy and she states her crohn's has been doing well. She is still making stool and denies fever or vomiting. She has had a hysterectomy and right oophorectomy, and is being followed by GYN for a cystic structure, likely a complicated left ovarian cyst. She has an appointment to follow up with GYN/ONC, but that is not until next week and she is concerned that she is getting more pain and discomfort. Her mother had endometrial cancer and she is concerned about that.     She also had episodes of palpitations and elevated blood pressure last years. There was question whether this could be from a pheochromocytoma, but she had an MRI of her abdomen that showed no pathology with her adrenal glands.     The history is provided by the patient.     Review of patient's allergies indicates:   Allergen Reactions    Azathioprine sodium Other (See Comments)     Other reaction(s): pancreatitis  Other reaction(s): pancreatitis    Methotrexate analogues Other (See Comments)     leukopenia    Stelara [ustekinumab] Other (See Comments)     Multiple infections    Zofran [ondansetron hcl (pf)] Other (See Comments)     Per patient causes prolong QT    Vancomycin analogues Hives    Morphine Itching and Other (See Comments)     Other reaction(s): Itching    Bactrim [sulfamethoxazole-trimethoprim] Palpitations    Ciprofloxacin Palpitations     Past Medical History:    Diagnosis Date    Abnormal Pap smear 2007    Abnormal Pap smear 5/26/2011    Anemia     Anxiety     Arthritis     C. difficile diarrhea     Crohn's disease     Depression 8/5/2017    Encounter for blood transfusion     Genital HSV     History of colposcopy with cervical biopsy 2007 and 7/2011 2007-LYLA I  and 7/2011- LYLA I    Hypertension     Kidney stone     Kidney stone     Recurrent UTI 4/3/2013    S/P ileostomy 7/9/2012    Sterilization 6/23/2012     Past Surgical History:   Procedure Laterality Date    ABDOMINAL SURGERY      APPENDECTOMY      BLADDER SURGERY      partial cystectomy due to fistula    CKC      COLON SURGERY      COLONOSCOPY      EGD (ESOPHAGOGASTRODUODENOSCOPY) N/A 9/6/2013    Performed by Emilio Cameron [facility] at [facility] (4TH FLR)    ESOPHAGOGASTRODUODENOSCOPY (EGD) N/A 10/14/2016    Performed by Janelle Mcpherson [facility] at [facility] (2ND FLR)    ESOPHAGOGASTRODUODENOSCOPY (EGD) N/A 10/23/2014    Performed by Nathanael Mitchell [facility] at [facility] (2ND FLR)    EXAM UNDER ANESTHESIA N/A 8/29/2013    Performed by Bob Parsons [facility] at [facility] OR 2ND FLR    EXAM UNDER ANESTHESIA N/A 1/18/2013    Performed by [facility] at [facility] 2ND FLR    HYSTERECTOMY-ABDOMINAL-TOTAL (SHELLIE) N/A 4/16/2015    [facility]    ILEOSCOPY N/A 8/8/2017    Performed by Tommie Klein MD at [facility] (2ND FLR)    ILEOSCOPY N/A 10/14/2016    Performed by [facility] at [facility] (2ND FLR)    ILEOSCOPY N/A 9/25/2013    Performed by [facility] Cameron [facility] at [facility] (4TH FLR)    ILEOSTOMY      INCISION AND DRAINAGE, ABSCESS N/A 8/29/2013    Performed by Bob [facility] at [facility] 2ND FLR    ZPBHYDXGO-RRRR-Z-CATH Left 2/21/2017    Performed by Parish Sanchez Jr., MD at [facility] OR 2ND FLR    OOPHORECTOMY Right 04/16/2015    PORTACATH PLACEMENT  02/21/2017    REPAIR-BLADDER  4/16/2015    [facility]     SALPINGO-OOPHERECTOMY Right 4/16/2015    [facility]    SIGMOIDOSCOPY, FLEXIBLE N/A 2/7/2014    [facility]formed by [facility]ell [facility] at [facility] (2ND FLR)    SKIN BIOPSY      SMALL INTESTINE SURGERY      TOTAL ABDOMINAL HYSTERECTOMY  04/16/2015    TOTAL COLECTOMY      TUBAL LIGATION  06/06/2012    UPPER GASTROINTESTINAL ENDOSCOPY       Family History   Problem Relation Age of Onset    Colon cancer Father     Cancer Father         colon cancer    Hypertension Mother     Cancer Maternal Grandfather         esopghal     Skin cancer Maternal Grandfather     Endometrial cancer Maternal Aunt     Crohn's disease Brother     Breast cancer Neg Hx     Ovarian cancer Neg Hx      Social History     Tobacco Use    Smoking status: Never Smoker    Smokeless tobacco: Never Used   Substance Use Topics    Alcohol use: Yes     Alcohol/week: 0.0 oz     Comment: Patient only drinks wine not regular basis.    Drug use: No     Types: Mescaline     Review of Systems   Constitutional: Negative for fever.   HENT: Negative for sore throat.    Eyes: Negative for redness.   Respiratory: Negative for cough and shortness of breath.    Cardiovascular: Negative for chest pain.   Gastrointestinal: Positive for abdominal pain. Negative for constipation, nausea and vomiting.   Genitourinary: Negative for dysuria, vaginal bleeding and vaginal discharge.   Musculoskeletal: Negative for back pain.   Skin: Negative for rash.   Neurological: Negative for weakness.   Psychiatric/Behavioral: Negative for confusion.       Physical Exam     Initial Vitals [04/09/19 1618]   BP Pulse Resp Temp SpO2   (!) 140/87 109 20 98.2 °F (36.8 °C) 99 %      MAP       --         Physical Exam    Nursing note and vitals reviewed.  Constitutional: She appears well-developed and well-nourished. She is not diaphoretic. No distress.   HENT:   Head: Normocephalic and atraumatic.   Mouth/Throat: Oropharynx is clear and moist.    Eyes: Conjunctivae and EOM are normal. Pupils are equal, round, and reactive to light.   Neck: Normal range of motion. Neck supple.   Cardiovascular: Normal rate, regular rhythm and normal heart sounds. Exam reveals no gallop and no friction rub.    No murmur heard.  Pulmonary/Chest: Breath sounds normal. No respiratory distress. She has no wheezes. She has no rhonchi. She has no rales.   Abdominal: Soft.   No localized tenderness, rebound, or guarding. Fullness to left lower abdomen and ostomy to right abdomen.    Musculoskeletal: Normal range of motion.   Neurological: She is alert and oriented to person, place, and time. She has normal strength. No sensory deficit. Gait normal.   Skin: Skin is warm and dry.   Psychiatric: She has a normal mood and affect. Her speech is normal and behavior is normal.         ED Course   Procedures  Labs Reviewed   URINALYSIS, REFLEX TO URINE CULTURE - Abnormal; Notable for the following components:       Result Value    Specific Gravity, UA <=1.005 (*)     All other components within normal limits    Narrative:     Preferred Collection Type->Urine, Clean Catch   COMPREHENSIVE METABOLIC PANEL - Abnormal; Notable for the following components:    Alkaline Phosphatase 136 (*)     All other components within normal limits   CBC W/ AUTO DIFFERENTIAL   LIPASE   POCT URINE PREGNANCY     EKG Readings: (Independently Interpreted)   Normal sinus rhythm. Rate of 90 bpm. No ischemic changes.        Imaging Results          US Pelvis Comp with Transvag NON-OB (xpd) (Final result)  Result time 04/09/19 19:02:44   Procedure changed from US Pelvis Complete Non OB     Final result by Bibiana Wayne MD (04/09/19 19:02:44)                 Impression:      1. Redemonstration of complex left ovarian cystic lesion with suspected internal solid component.  This appears stable to mildly increased in size compared to previous pelvic ultrasound from 03/19/2019, allowing for interobserver variability.   Ob GYN follow-up is recommended if not previously obtained.  2. Right adnexal septated cystic structure versus adjacent cysts.  3. Postsurgical changes of hysterectomy and right oophorectomy.      Electronically signed by: Bibiana Wayne MD  Date:    04/09/2019  Time:    19:02             Narrative:    EXAMINATION:  US PELVIS COMP WITH TRANSVAG NON-OB (XPD)    CLINICAL HISTORY:  pel avic pain;    TECHNIQUE:  Transabdominal sonography of the pelvis was performed, followed by transvaginal sonography to better evaluate the uterus and ovaries.    COMPARISON:  Recent pelvic ultrasound from 03/19/2019.    FINDINGS:  Uterus has been removed.  Right ovary has also reportedly been removed.    Complex right adnexal septated cystic structure versus two adjacent cysts are visualized.  This measures 4.8 x 2.3 x 4.4 cm.  Left ovary is enlarged measuring 7.2 x 3.2 x 7.3 cm.  Complex left ovarian cystic lesion is visualized measuring 5.3 x 3.2 x 7.3 cm with suspected solid internal component.  Arterial and venous flow is preserved on the left.  No significant free fluid is seen within the pelvis.                                 Medical Decision Making:   Initial Assessment:   Patient with lower abdominal discomfort and fullness. Will evaluate with pelvic US. This sounds more ovarian vs GI. Will also check basic labs, give fluids and pain medication.  Independently Interpreted Test(s):   I have ordered and independently interpreted EKG Reading(s) - see prior notes  Clinical Tests:   Lab Tests: Ordered and Reviewed  Radiological Study: Ordered and Reviewed  Medical Tests: Ordered and Reviewed  ED Management:  7:45 PM - Patient resting comfortably. I discussed the case with GYN. Clinically I doubt this is ovarian torsion. She has good flow on ultrasound and a benign abdominal exam. I doubt this is bowel related as well. Patient has a normal WBC count, normal exam, no vomiting, and good ostomy output. Will discharge home and patient  will follow up with Dr. German next week.   Other:   I have discussed this case with another health care provider.            Scribe Attestation:   Scribe #1: I performed the above scribed service and the documentation accurately describes the services I performed. I attest to the accuracy of the note.    Attending Attestation:           Physician Attestation for Scribe:  Physician Attestation Statement for Scribe #1: I, Dr. Pennington, reviewed documentation, as scribed by Davis Gill in my presence, and it is both accurate and complete.                    Clinical Impression:     1. Lower abdominal pain    2. Palpitations    3. Cyst of left ovary           Disposition:   Disposition: Discharged  Condition: Stable                        Renan Pennington MD  04/09/19 9299

## 2019-04-09 NOTE — ED NOTES
Report received from MIKE Brasher. Pt sitting in bed comfortably. Pt denies needing anything at this time. NAD noted. Side rails up x 2. Call bell within reach. Will continue to monitor.

## 2019-04-14 DIAGNOSIS — K50.819 CROHN'S DISEASE OF BOTH SMALL AND LARGE INTESTINE WITH COMPLICATION: ICD-10-CM

## 2019-04-14 DIAGNOSIS — Z79.899 ENCOUNTER FOR LONG-TERM (CURRENT) USE OF HIGH-RISK MEDICATION: ICD-10-CM

## 2019-04-15 RX ORDER — CLINDAMYCIN PHOSPHATE 20 MG/G
CREAM VAGINAL
Qty: 40 G | Refills: 0 | Status: ON HOLD | OUTPATIENT
Start: 2019-04-15 | End: 2019-06-06 | Stop reason: SDUPTHER

## 2019-04-15 RX ORDER — DRONABINOL 5 MG/1
CAPSULE ORAL
Qty: 60 CAPSULE | Refills: 0 | Status: SHIPPED | OUTPATIENT
Start: 2019-04-15 | End: 2019-05-14 | Stop reason: SDUPTHER

## 2019-04-16 ENCOUNTER — TELEPHONE (OUTPATIENT)
Dept: GYNECOLOGIC ONCOLOGY | Facility: CLINIC | Age: 39
End: 2019-04-16

## 2019-04-17 ENCOUNTER — INITIAL CONSULT (OUTPATIENT)
Dept: GYNECOLOGIC ONCOLOGY | Facility: CLINIC | Age: 39
End: 2019-04-17
Payer: MEDICAID

## 2019-04-17 VITALS
BODY MASS INDEX: 21.52 KG/M2 | HEIGHT: 61 IN | SYSTOLIC BLOOD PRESSURE: 135 MMHG | WEIGHT: 114 LBS | DIASTOLIC BLOOD PRESSURE: 93 MMHG | HEART RATE: 78 BPM

## 2019-04-17 DIAGNOSIS — R19.00 PELVIC MASS: Primary | ICD-10-CM

## 2019-04-17 PROCEDURE — 99213 OFFICE O/P EST LOW 20 MIN: CPT | Mod: PBBFAC | Performed by: OBSTETRICS & GYNECOLOGY

## 2019-04-17 PROCEDURE — 99999 PR PBB SHADOW E&M-EST. PATIENT-LVL III: ICD-10-PCS | Mod: PBBFAC,,, | Performed by: OBSTETRICS & GYNECOLOGY

## 2019-04-17 PROCEDURE — 99204 PR OFFICE/OUTPT VISIT, NEW, LEVL IV, 45-59 MIN: ICD-10-PCS | Mod: S$PBB,,, | Performed by: OBSTETRICS & GYNECOLOGY

## 2019-04-17 PROCEDURE — 99204 OFFICE O/P NEW MOD 45 MIN: CPT | Mod: S$PBB,,, | Performed by: OBSTETRICS & GYNECOLOGY

## 2019-04-17 PROCEDURE — 99999 PR PBB SHADOW E&M-EST. PATIENT-LVL III: CPT | Mod: PBBFAC,,, | Performed by: OBSTETRICS & GYNECOLOGY

## 2019-04-17 NOTE — LETTER
April 21, 2019      Alix Morales MD  [address] [place] [id number]           [address]  [address], [address]  [facility] [address]  Phone: [phone]  Fax: [phone]          Patient: Ivy Salgado   MR Number: [id number]   YOB: 1980   Date of Visit: 4/17/2019       Dear Dr. Alix Morales:    Thank you for referring Ivy Salgado to me for evaluation. Attached you will find relevant portions of my assessment and plan of care.    If you have questions, please do not hesitate to call me. I look forward to following Ivy Salgado along with you.    Sincerely,    Rupa German MD    Enclosure  CC:  MD Parish Perry MD    If you would like to receive this communication electronically, please contact [email or web address] or [phone] to request more information on [employer] Link access.    For providers and/or their staff who would like to refer a patient to [facility], please contact us through our one-stop-shop provider referral line, [facility], at [phone].    If you feel you have received this communication in error or would no longer like to receive these types of communications, please e-mail [email or web address]

## 2019-04-18 ENCOUNTER — PATIENT MESSAGE (OUTPATIENT)
Dept: GASTROENTEROLOGY | Facility: CLINIC | Age: 39
End: 2019-04-18

## 2019-04-18 DIAGNOSIS — K50.819 CROHN'S DISEASE OF SMALL AND LARGE INTESTINES WITH COMPLICATION: ICD-10-CM

## 2019-04-18 RX ORDER — OXYCODONE AND ACETAMINOPHEN 10; 325 MG/1; MG/1
1 TABLET ORAL EVERY 6 HOURS PRN
Qty: 40 TABLET | Refills: 0 | Status: SHIPPED | OUTPATIENT
Start: 2019-04-18 | End: 2019-05-10 | Stop reason: SDUPTHER

## 2019-04-21 NOTE — PROGRESS NOTES
Subjective:      Patient ID: Ivy Salgado is a 38 y.o. female.    Chief Complaint: No chief complaint on file.      HPI  Referred by Dr. Alix Morales for bilateral ovarian cysts and complex surgical history.     Has Crohn's disease with multiple prior intestinal procedures with resultant total colectomy and end ileostomy in 2012 with Dr. Parsons. In 2015 underwent SHELLIE/RSO, review of operative report note severe adhesive disease with incidental cystotomy and unable to visualize left adnexal due to adhesive disease. GI physician Dr. Ross.     More recently developed worsening pelvic pain.   12/6/18 CT A&P  Impression     1.  Several bilateral renal hypodensities, favored to represent cysts, unchanged from prior CT.  2.  Nonobstructive bilateral nephrolithiasis.  3.  Right adnexal complex cystic structure measuring 6.2 x 0.5 x 6.5 cm, similar to a pelvic ultrasound 07/03/2018.  Once again, as noted on the patient's prior ultrasound examination, this may represent a peritoneal inclusion cyst or ovarian cyst from a remnant of ovarian tissue in this patient status post right oophorectomy and hysterectomy.     Pelvic US 4/9/19  Impression     1. Redemonstration of complex left ovarian cystic lesion with suspected internal solid component.  This appears stable to mildly increased in size compared to previous pelvic ultrasound from 03/19/2019, allowing for interobserver variability.  Ob GYN follow-up is recommended if not previously obtained.  2. Right adnexal septated cystic structure versus adjacent cysts.  3. Postsurgical changes of hysterectomy and right oophorectomy.     Family history significant for maternal aunt with breast cancer, maternal aunt with endometrial cancer, father with colon cancer, and paternal grandmother with esophageal cancer. 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 Has plans to start additional schooling in August and a vacation planning in July.      Review of Systems   Constitutional: Negative  for appetite change, chills, fatigue and fever.   HENT: Negative for mouth sores.    Respiratory: Negative for cough and shortness of breath.    Cardiovascular: Negative for leg swelling.   Gastrointestinal: Negative for abdominal pain, blood in stool, constipation and diarrhea.   Endocrine: Negative for cold intolerance.   Genitourinary: Negative for dysuria and vaginal bleeding.   Musculoskeletal: Negative for myalgias.   Skin: Negative for rash.   Allergic/Immunologic: Negative.    Neurological: Negative for weakness and numbness.   Hematological: Negative for adenopathy. Does not bruise/bleed easily.   Psychiatric/Behavioral: Negative for confusion.       Past Medical History:   Diagnosis Date    Abnormal Pap smear 2007    Abnormal Pap smear 5/26/2011    Anemia     Anxiety     Arthritis     C. difficile diarrhea     Crohn's disease     Depression 8/5/2017    Encounter for blood transfusion     Genital HSV     History of colposcopy with cervical biopsy 2007 and 7/2011 2007-LYLA I  and 7/2011- LYLA I    Hypertension     Kidney stone     Kidney stone     Recurrent UTI 4/3/2013    S/P ileostomy 7/9/2012    Sterilization 6/23/2012     Past Surgical History:   Procedure Laterality Date    ABDOMINAL SURGERY      APPENDECTOMY      BLADDER SURGERY      partial cystectomy due to fistula    CKC      COLON SURGERY      COLONOSCOPY      EGD (ESOPHAGOGASTRODUODENOSCOPY) N/A 9/6/2013    [facility]formed by [facility] (4TH FLR)    ESOPHAGOGASTRODUODENOSCOPY (EGD) N/A 10/14/2016    Performed by Janelle [facility]pherson [facility] at [facility] (2ND FLR)    ESOPHAGOGASTRODUODENOSCOPY (EGD) N/A 10/23/2014    Performed by N[facility]anael Mitchell MD at [facility] (2ND FLR)    EXAM UNDER ANESTHESIA N/A 8/29/2013    [facility] by [facility] at [facility] OR 2ND FLR    EXAM UNDER ANESTHESIA N/A 1/18/2013    [facility] by [facility] at [facility] 2ND FLR    HYSTERECTOMY-ABDOMINAL-TOTAL (SHELLIE) N/A  4/16/2015    [facility] OR    ILEOSCOPY N/A 8/8/2017    [facility] by [facility] at [facility] (2ND FLR)    ILEOSCOPY N/A 10/14/2016    Performed by Jan[facility] MD at [facility] END[facility] (2ND FLR)    ILEOSCOPY N/A 9/25/2013    Performed by [facility] at [facility] END[facility] (4TH FLR)    ILEOSTOMY      INCISION AND DRAINAGE, ABSCESS N/A 8/29/2013    Performed by [facility] at [facility] OR 2ND FLR    AAPKQODCY-YWAH-M-CATH Left 2/21/2017    Performed by Parish Sanchez Jr., MD at [facility] OR 2ND FLR    OOPHORECTOMY Right 04/16/2015    PORTACATH PLACEMENT  02/21/2017    REPAIR-BLADDER  4/16/2015    [facility]    SALPINGO-OOPHERECTOMY Right 4/16/2015    [facility]    SIGMOIDOSCOPY, FLEXIBLE N/A 2/7/2014    Performed by Er[facility]o [facility]ell MD at [facility] END[facility] (2ND FLR)    SKIN BIOPSY      SMALL INTESTINE SURGERY      TOTAL ABDOMINAL HYSTERECTOMY  04/16/2015    TOTAL COLECTOMY      TUBAL LIGATION  06/06/2012    UPPER GASTROINTESTINAL ENDOSCOPY       Family History   Problem Relation Age of Onset    Colon cancer Father     Cancer Father         colon cancer    Hypertension Mother     Cancer Maternal Grandfather         esopghal     Skin cancer Maternal Grandfather     Endometrial cancer Maternal Aunt     Crohn's disease Brother     Breast cancer Neg Hx     Ovarian cancer Neg Hx      Social History     Socioeconomic History    Marital status: Single     Spouse name: Not on file    Number of children: Not on file    Years of education: Not on file    Highest education level: Not on file   Occupational History     Employer: [employer]   Social Needs    Financial resource strain: Not on file    Food insecurity:     Worry: Not on file     Inability: Not on file    Transportation needs:     Medical: Not on file     Non-medical: Not on file   Tobacco Use    Smoking status: Never Smoker     Smokeless tobacco: Never Used   Substance and Sexual Activity    Alcohol use: Not Currently     Alcohol/week: 0.0 oz    Drug use: No     Types: Mescaline    Sexual activity: Yes     Partners: Male     Birth control/protection: Pill, Surgical, Condom     Comment: GELA   Lifestyle    Physical activity:     Days per week: Not on file     Minutes per session: Not on file    Stress: Not on file   Relationships    Social connections:     Talks on phone: Not on file     Gets together: Not on file     Attends Mosque service: Not on file     Active member of club or organization: Not on file     Attends meetings of clubs or organizations: Not on file     Relationship status: Not on file   Other Topics Concern    Are you pregnant or think you may be? Not Asked    Breast-feeding Not Asked   Social History Narrative    Not on file     Current Outpatient Medications   Medication Sig    cephALEXin (KEFLEX) 250 MG capsule Take 1 capsule (250 mg total) by mouth once daily.    clindamycin (CLINDESSE) 2 % vaginal cream PLACE VAGINALLY EVERY EVENING    clonazePAM (KLONOPIN) 1 MG tablet Take 1 tablet (1 mg total) by mouth 2 (two) times daily as needed (anxiety).    diphenoxylate-atropine 2.5-0.025 mg (LOMOTIL) 2.5-0.025 mg per tablet Take 1 tablet by mouth 4 (four) times daily as needed for Diarrhea.    dronabinol (MARINOL) 5 MG capsule TAKE ONE CAPSULE BY MOUTH TWICE DAILY BEFORE MEALS    fluconazole (DIFLUCAN) 150 MG Tab TK 1 T PO ONCE    food supplemt, lactose-reduced (BOOST BREEZE NUTRITIONAL) 0.04-1.05 gram-kcal/mL Liqd Use 3 times per day    loperamide (IMODIUM) 2 mg capsule Take 2 mg by mouth daily as needed for Diarrhea.    magnesium 250 mg Tab Take by mouth once.    mirtazapine (REMERON SOL-TAB) 30 MG disintegrating tablet Take 1 tablet (30 mg total) by mouth every evening.    multivitamin (ONE DAILY MULTIVITAMIN) per tablet Take 1 tablet by mouth once daily.    potassium citrate (UROCIT-K 15) 15 mEq  TbSR Take 2 tablets by mouth 2 (two) times daily.    promethazine (PHENERGAN) 25 MG tablet Take 1 tablet (25 mg total) by mouth every 6 (six) hours as needed.    terconazole (TERAZOL 7) 0.4 % Crea INSERT ONE APPLICATORFUL VAGINALLY AT BEDTIME    valACYclovir (VALTREX) 500 MG tablet TAKE 1 TABLET BY MOUTH EVERY DAY    zolpidem (AMBIEN) 10 mg Tab TAKE 1 TABLET BY MOUTH EVERY EVENING    oxyCODONE-acetaminophen (PERCOCET)  mg per tablet Take 1 tablet by mouth every 6 (six) hours as needed for Pain.     No current facility-administered medications for this visit.      Review of patient's allergies indicates:   Allergen Reactions    Azathioprine sodium Other (See Comments)     Other reaction(s): pancreatitis  Other reaction(s): pancreatitis    Methotrexate analogues Other (See Comments)     leukopenia    Stelara [ustekinumab] Other (See Comments)     Multiple infections    Zofran [ondansetron hcl (pf)] Other (See Comments)     Per patient causes prolong QT    Vancomycin analogues Hives    Morphine Itching and Other (See Comments)     Other reaction(s): Itching    Bactrim [sulfamethoxazole-trimethoprim] Palpitations    Ciprofloxacin Palpitations       Objective:   Physical Exam:   Constitutional: She is oriented to person, place, and time. She appears well-developed and well-nourished.    HENT:   Head: Normocephalic and atraumatic.    Eyes: Pupils are equal, round, and reactive to light. EOM are normal.    Neck: Normal range of motion. Neck supple. No thyromegaly present.    Cardiovascular: Normal rate, regular rhythm and intact distal pulses.     Pulmonary/Chest: Effort normal and breath sounds normal. No respiratory distress. She has no wheezes.        Abdominal: Soft. Bowel sounds are normal. She exhibits no distension, no ascites and no mass. There is no tenderness.   RLQ ileostomy             Musculoskeletal: Normal range of motion and moves all extremeties.      Lymphadenopathy:     She has no  cervical adenopathy.        Right: No supraclavicular adenopathy present.        Left: No supraclavicular adenopathy present.    Neurological: She is alert and oriented to person, place, and time.    Skin: Skin is warm and dry. No rash noted.    Psychiatric: She has a normal mood and affect.       Assessment:     1. Pelvic mass        Plan:   No orders of the defined types were placed in this encounter.      Long discussion with the patient regarding recurring ovarian cysts and pelvic pain. Ultimately she desires definitive management with BSO/resection of ovarian remnant. Complex surgical history with multiple prior abdominal surgeries and has end ileostomy. We discussed the R/B/A to surgery including but not limited to definitive surgical resection, possible recurrence, fistula formation, bowel or urologic injury. Will discuss with Dr. Parsons who created her end ileostomy regarding surgical planning. Will see her back after discussion with her other care team members.     Copy Cody Wheatley Whitlow.

## 2019-04-23 ENCOUNTER — PATIENT MESSAGE (OUTPATIENT)
Dept: GYNECOLOGIC ONCOLOGY | Facility: CLINIC | Age: 39
End: 2019-04-23

## 2019-04-30 DIAGNOSIS — F41.9 ANXIETY: ICD-10-CM

## 2019-04-30 DIAGNOSIS — F41.1 GENERALIZED ANXIETY DISORDER: ICD-10-CM

## 2019-04-30 DIAGNOSIS — K50.819 CROHN'S DISEASE OF SMALL AND LARGE INTESTINES WITH COMPLICATION: ICD-10-CM

## 2019-04-30 RX ORDER — DIPHENOXYLATE HYDROCHLORIDE AND ATROPINE SULFATE 2.5; .025 MG/1; MG/1
1 TABLET ORAL 4 TIMES DAILY PRN
Qty: 100 TABLET | Refills: 0 | Status: SHIPPED | OUTPATIENT
Start: 2019-04-30 | End: 2019-05-14 | Stop reason: SDUPTHER

## 2019-04-30 RX ORDER — ZOLPIDEM TARTRATE 10 MG/1
TABLET ORAL
Qty: 30 TABLET | Refills: 0 | Status: SHIPPED | OUTPATIENT
Start: 2019-04-30 | End: 2019-05-27 | Stop reason: SDUPTHER

## 2019-04-30 RX ORDER — CLONAZEPAM 1 MG/1
1 TABLET ORAL 2 TIMES DAILY PRN
Qty: 60 TABLET | Refills: 0 | Status: SHIPPED | OUTPATIENT
Start: 2019-04-30 | End: 2019-05-27 | Stop reason: SDUPTHER

## 2019-05-02 ENCOUNTER — LAB VISIT (OUTPATIENT)
Dept: LAB | Facility: HOSPITAL | Age: 39
End: 2019-05-02
Attending: UROLOGY
Payer: MEDICAID

## 2019-05-02 ENCOUNTER — PATIENT MESSAGE (OUTPATIENT)
Dept: UROLOGY | Facility: CLINIC | Age: 39
End: 2019-05-02

## 2019-05-02 DIAGNOSIS — N39.0 RECURRENT UTI: ICD-10-CM

## 2019-05-02 DIAGNOSIS — N39.0 RECURRENT UTI: Primary | ICD-10-CM

## 2019-05-02 LAB
MICROSCOPIC COMMENT: ABNORMAL
RBC #/AREA URNS AUTO: 6 /HPF (ref 0–4)
SQUAMOUS #/AREA URNS AUTO: 1 /HPF

## 2019-05-02 PROCEDURE — 87086 URINE CULTURE/COLONY COUNT: CPT

## 2019-05-02 PROCEDURE — 81001 URINALYSIS AUTO W/SCOPE: CPT

## 2019-05-02 PROCEDURE — 87077 CULTURE AEROBIC IDENTIFY: CPT

## 2019-05-02 PROCEDURE — 87088 URINE BACTERIA CULTURE: CPT

## 2019-05-02 PROCEDURE — 87186 SC STD MICRODIL/AGAR DIL: CPT

## 2019-05-03 ENCOUNTER — PATIENT MESSAGE (OUTPATIENT)
Dept: UROLOGY | Facility: CLINIC | Age: 39
End: 2019-05-03

## 2019-05-04 ENCOUNTER — HOSPITAL ENCOUNTER (EMERGENCY)
Facility: HOSPITAL | Age: 39
Discharge: HOME | End: 2019-05-04
Attending: EMERGENCY MEDICINE
Payer: MEDICAID

## 2019-05-04 VITALS
RESPIRATION RATE: 16 BRPM | DIASTOLIC BLOOD PRESSURE: 79 MMHG | TEMPERATURE: 99 F | HEART RATE: 80 BPM | OXYGEN SATURATION: 100 % | SYSTOLIC BLOOD PRESSURE: 148 MMHG | HEIGHT: 61 IN | WEIGHT: 110 LBS | BODY MASS INDEX: 20.77 KG/M2

## 2019-05-04 DIAGNOSIS — S92.534A NONDISPLACED FRACTURE OF DISTAL PHALANX OF RIGHT LESSER TOE(S), INITIAL ENCOUNTER FOR CLOSED FRACTURE: Primary | ICD-10-CM

## 2019-05-04 PROCEDURE — 99283 EMERGENCY DEPT VISIT LOW MDM: CPT

## 2019-05-04 PROCEDURE — 25000003 PHARM REV CODE 250: Performed by: EMERGENCY MEDICINE

## 2019-05-04 PROCEDURE — 99284 PR EMERGENCY DEPT VISIT,LEVEL IV: ICD-10-PCS | Mod: ,,, | Performed by: EMERGENCY MEDICINE

## 2019-05-04 PROCEDURE — 99284 EMERGENCY DEPT VISIT MOD MDM: CPT | Mod: ,,, | Performed by: EMERGENCY MEDICINE

## 2019-05-04 RX ORDER — IBUPROFEN 400 MG/1
400 TABLET ORAL
Status: COMPLETED | OUTPATIENT
Start: 2019-05-04 | End: 2019-05-04

## 2019-05-04 RX ADMIN — IBUPROFEN 400 MG: 400 TABLET, FILM COATED ORAL at 10:05

## 2019-05-05 NOTE — ED PROVIDER NOTES
Encounter Date: 5/4/2019    SCRIBE #1 NOTE: I, Shaheen Rogers, am scribing for, and in the presence of,  Dr. Mahajan. I have scribed the following portions of the note - Other sections scribed: HPI, ROS, PE.       History     Chief Complaint   Patient presents with    Toe Injury     R second toe pain; possible dislocation     Mrs. Salgado is a 38-year-old female with past medical history of kidney stones and hypertension who presents to the ED with a chief complaint of left foot pain for the past few hours. Patient was at a crawfish boil when she slipped on a bouncy house and hit the side of her foot. Complains of inability to move her left 2nd toe. Describes her pain as a 5-6/10 in severity but went up to 9/10 when palpated. She has not taken anything for pain. Associated symptoms include some numbness.     The history is provided by the patient and medical records.     Review of patient's allergies indicates:   Allergen Reactions    Azathioprine sodium Other (See Comments)     Other reaction(s): pancreatitis  Other reaction(s): pancreatitis    Methotrexate analogues Other (See Comments)     leukopenia    Stelara [ustekinumab] Other (See Comments)     Multiple infections    Zofran [ondansetron hcl (pf)] Other (See Comments)     Per patient causes prolong QT    Vancomycin analogues Hives    Morphine Itching and Other (See Comments)     Other reaction(s): Itching    Bactrim [sulfamethoxazole-trimethoprim] Palpitations    Ciprofloxacin Palpitations     Past Medical History:   Diagnosis Date    Abnormal Pap smear 2007    Abnormal Pap smear 5/26/2011    Anemia     Anxiety     Arthritis     C. difficile diarrhea     Crohn's disease     Depression 8/5/2017    Encounter for blood transfusion     Genital HSV     History of colposcopy with cervical biopsy 2007 and 7/2011 2007-LYLA I  and 7/2011- LYLA I    Hypertension     Kidney stone     Kidney stone     Recurrent UTI 4/3/2013    S/P ileostomy 7/9/2012     Sterilization 6/23/2012     Past Surgical History:   Procedure Laterality Date    ABDOMINAL SURGERY      APPENDECTOMY      BLADDER SURGERY      partial cystectomy due to fistula    CKC      COLON SURGERY      COLONOSCOPY      EGD (ESOPHAGOGASTRODUODENOSCOPY) N/A 9/6/2013    [facility]formed by [facility] at [facility] (4TH FLR)    ESOPHAGOGASTRODUODENOSCOPY (EGD) N/A 10/14/2016    Performed by Janelle Mcpherson [facility] at [facility] END[facility] (2ND FLR)    ESOPHAGOGASTRODUODENOSCOPY (EGD) N/A 10/23/2014    Performed by Nathanael Mitchell MD at [facility] END[facility] (2ND FLR)    EXAM UNDER ANESTHESIA N/A 8/29/2013    Performed by [facility]ons [facility] at [facility] OR 2ND FLR    EXAM UNDER ANESTHESIA N/A 1/18/2013    [facility] at [facility] OR 2ND FLR    HYSTERECTOMY-ABDOMINAL-TOTAL (SHELLIE) N/A 4/16/2015    [facility] by [facility] at [facility] OR    ILEOSCOPY N/A 8/8/2017    Performed by Tommie Klein MD at [facility] ENDO (2ND FLR)    ILEOSCOPY N/A 10/14/2016    Performed by [facility]pherson [facility] at [facility] END[facility] (2ND FLR)    ILEOSCOPY N/A 9/25/2013    Performed by Emilio Cameron [facility] at [facility] END[facility] (4TH FLR)    ILEOSTOMY      INCISION AND DRAINAGE, ABSCESS N/A 8/29/2013    Performed by [facility] at [facility] OR 2ND FLR    DYNYMYBZG-FCGA-P-CATH Left 2/21/2017    Performed by Parish Sanchez Jr., MD at [facility] OR 2ND FLR    OOPHORECTOMY Right 04/16/2015    PORTACATH PLACEMENT  02/21/2017    REPAIR-BLADDER  4/16/2015    [facility] at [facility] OR    SALPINGO-OOPHERECTOMY Right 4/16/2015    [facility] at [facility] OR    SIGMOIDOSCOPY, FLEXIBLE N/A 2/7/2014    Performed by Erasto Sewell MD at [facility] END[facility] (2ND FLR)    SKIN BIOPSY      SMALL INTESTINE SURGERY      TOTAL ABDOMINAL HYSTERECTOMY  04/16/2015    TOTAL COLECTOMY      TUBAL LIGATION  06/06/2012    UPPER GASTROINTESTINAL ENDOSCOPY       Family History   Problem Relation Age of Onset    Colon cancer Father      Cancer Father         colon cancer    Hypertension Mother     Cancer Maternal Grandfather         esopghal     Skin cancer Maternal Grandfather     Endometrial cancer Maternal Aunt     Crohn's disease Brother     Breast cancer Neg Hx     Ovarian cancer Neg Hx      Social History     Tobacco Use    Smoking status: Never Smoker    Smokeless tobacco: Never Used   Substance Use Topics    Alcohol use: Not Currently     Alcohol/week: 0.0 oz    Drug use: No     Types: Mescaline     Review of Systems   Constitutional: Negative for chills.   HENT: Negative for sore throat.    Eyes: Negative for pain.   Respiratory: Negative for cough.    Cardiovascular: Negative for chest pain.   Gastrointestinal: Negative for nausea.   Endocrine: Negative for polyuria.   Genitourinary: Negative for frequency.   Musculoskeletal:        Positive for left foot pain.   Neurological: Positive for numbness.       Physical Exam     Initial Vitals [05/04/19 2051]   BP Pulse Resp Temp SpO2   (!) 153/91 99 14 99.2 °F (37.3 °C) 100 %      MAP       --         Physical Exam    Constitutional: She appears well-developed and well-nourished. She is not diaphoretic. No distress.   HENT:   Head: Normocephalic and atraumatic.   Mouth/Throat: Oropharynx is clear and moist.   Neck: Normal range of motion. Neck supple. No JVD present.   Cardiovascular: Normal rate, regular rhythm, normal heart sounds and intact distal pulses.   Pulmonary/Chest: Breath sounds normal. No respiratory distress. She has no wheezes. She has no rhonchi. She has no rales.   Abdominal: Soft. She exhibits no distension. There is no tenderness.   Musculoskeletal: She exhibits no edema.   2nd right toe: (+) deformity with deviation to right, (+) tenderness of PIP and base of 2nd phalanx.   Ankle: FROM  Distal pulses intact.   Lymphadenopathy:     She has no cervical adenopathy.   Neurological: She is alert and oriented to person, place, and time. She has normal strength. No  cranial nerve deficit or sensory deficit.   Skin: Skin is warm and dry.         ED Course   Procedures  Labs Reviewed - No data to display       Imaging Results          X-Ray Toe 2 or More Views Right (Final result)  Result time 05/04/19 21:56:05    Final result by Paul Woodward MD (05/04/19 21:56:05)                 Impression:      Acute nondisplaced extra-articular fracture of the base of the second proximal phalanx of the right foot.      Electronically signed by: Paul Woodward MD  Date:    05/04/2019  Time:    21:56             Narrative:    EXAMINATION:  XR TOE 2 OR MORE VIEWS RIGHT    CLINICAL HISTORY:  second toe injury;    TECHNIQUE:  Frontal and lateral radiographs of the right forefoot.    COMPARISON:  None    FINDINGS:  There is an acute nondisplaced extra-articular fracture involving the base of the second proximal phalanx.  The remainder of the visualized osseous structures are within normal limits.  The joint spaces are maintained.  The soft tissues are unremarkable.  No radiopaque foreign body is identified.  There is no evidence of a dislocation.                              X-Rays:   Independently Interpreted Readings:   Other Readings:  Nondisplaced fracture at the base of the 2nd proximal left phalanx.    Medical Decision Making:   History:   Old Medical Records: I decided to obtain old medical records.  Initial Assessment:   Urgent evaluation a 38-year-old female presenting with toe injury  Differential Diagnosis:   Fracture, contusion, sprain, dislocation  Independently Interpreted Test(s):   I have ordered and independently interpreted X-rays - see prior notes.  Clinical Tests:   Radiological Study: Ordered and Reviewed  ED Management:  - x-ray  - ibuprofen    X-ray reviewed concerning for fracture at the base of the 2nd phalanx.  No acute displacement.  Will mindy-tape, placed in a hard-soled shoe.  Discussed treatment with ibuprofen/Tylenol for pain.  Patient stable for discharge             Scribe Attestation:   Scribe #1: I performed the above scribed service and the documentation accurately describes the services I performed. I attest to the accuracy of the note.    Attending Attestation:           Physician Attestation for Scribe:      Comments: I, Dr. Judith Mahajan, personally performed the services described in this documentation. All medical record entries made by the scribe were at my direction and in my presence.  I have reviewed the chart and agree that the record reflects my personal performance and is accurate and complete. Judith Mahajan MD.                 Clinical Impression:       ICD-10-CM ICD-9-CM   1. Nondisplaced fracture of distal phalanx of right lesser toe(s), initial encounter for closed fracture S92.534A 826.0         Disposition:   Disposition: Discharged  Condition: Stable                        Judith Mahajan MD  05/06/19 0127

## 2019-05-06 ENCOUNTER — TELEPHONE (OUTPATIENT)
Dept: UROLOGY | Facility: CLINIC | Age: 39
End: 2019-05-06

## 2019-05-06 DIAGNOSIS — N30.00 ACUTE CYSTITIS WITHOUT HEMATURIA: Primary | ICD-10-CM

## 2019-05-06 LAB — BACTERIA UR CULT: NORMAL

## 2019-05-06 RX ORDER — DOXYCYCLINE 100 MG/1
100 CAPSULE ORAL EVERY 12 HOURS
Qty: 10 CAPSULE | Refills: 0 | Status: SHIPPED | OUTPATIENT
Start: 2019-05-06 | End: 2019-05-11

## 2019-05-06 NOTE — TELEPHONE ENCOUNTER
Pt notified of + urine culture. Given her numerous drug allergies- doxy BID x 5 days. Instructed pt to resume her keflex prophylaxis following the doxy.

## 2019-05-10 DIAGNOSIS — K50.819 CROHN'S DISEASE OF SMALL AND LARGE INTESTINES WITH COMPLICATION: ICD-10-CM

## 2019-05-10 RX ORDER — OXYCODONE AND ACETAMINOPHEN 10; 325 MG/1; MG/1
1 TABLET ORAL EVERY 6 HOURS PRN
Qty: 40 TABLET | Refills: 0 | Status: SHIPPED | OUTPATIENT
Start: 2019-05-10 | End: 2019-06-17 | Stop reason: SDUPTHER

## 2019-05-10 RX ORDER — SODIUM CHLORIDE AND POTASSIUM CHLORIDE 150; 900 MG/100ML; MG/100ML
INJECTION, SOLUTION INTRAVENOUS CONTINUOUS
Status: CANCELLED
Start: 2019-05-14

## 2019-05-13 ENCOUNTER — PATIENT MESSAGE (OUTPATIENT)
Dept: WOUND CARE | Facility: CLINIC | Age: 39
End: 2019-05-13

## 2019-05-13 ENCOUNTER — PATIENT MESSAGE (OUTPATIENT)
Dept: GASTROENTEROLOGY | Facility: CLINIC | Age: 39
End: 2019-05-13

## 2019-05-13 DIAGNOSIS — Z79.899 ENCOUNTER FOR LONG-TERM (CURRENT) USE OF HIGH-RISK MEDICATION: ICD-10-CM

## 2019-05-13 DIAGNOSIS — K50.819 CROHN'S DISEASE OF BOTH SMALL AND LARGE INTESTINE WITH COMPLICATION: ICD-10-CM

## 2019-05-13 DIAGNOSIS — K50.819 CROHN'S DISEASE OF SMALL AND LARGE INTESTINES WITH COMPLICATION: ICD-10-CM

## 2019-05-14 ENCOUNTER — PATIENT MESSAGE (OUTPATIENT)
Dept: GYNECOLOGIC ONCOLOGY | Facility: CLINIC | Age: 39
End: 2019-05-14

## 2019-05-14 ENCOUNTER — PATIENT MESSAGE (OUTPATIENT)
Dept: GASTROENTEROLOGY | Facility: CLINIC | Age: 39
End: 2019-05-14

## 2019-05-14 ENCOUNTER — TELEPHONE (OUTPATIENT)
Dept: DERMATOLOGY | Facility: CLINIC | Age: 39
End: 2019-05-14

## 2019-05-14 ENCOUNTER — INFUSION (OUTPATIENT)
Dept: INFUSION THERAPY | Facility: OTHER | Age: 39
End: 2019-05-14
Attending: INTERNAL MEDICINE
Payer: MEDICAID

## 2019-05-14 VITALS
HEART RATE: 84 BPM | SYSTOLIC BLOOD PRESSURE: 139 MMHG | DIASTOLIC BLOOD PRESSURE: 65 MMHG | BODY MASS INDEX: 22.27 KG/M2 | HEIGHT: 61 IN | TEMPERATURE: 99 F | WEIGHT: 117.94 LBS | OXYGEN SATURATION: 100 % | RESPIRATION RATE: 18 BRPM

## 2019-05-14 DIAGNOSIS — K50.819 CROHN'S DISEASE OF SMALL AND LARGE INTESTINES WITH COMPLICATION: ICD-10-CM

## 2019-05-14 DIAGNOSIS — K50.019 CROHN'S DISEASE OF SMALL INTESTINE WITH COMPLICATION: Primary | ICD-10-CM

## 2019-05-14 PROCEDURE — 25000003 PHARM REV CODE 250: Performed by: INTERNAL MEDICINE

## 2019-05-14 PROCEDURE — 96365 THER/PROPH/DIAG IV INF INIT: CPT

## 2019-05-14 PROCEDURE — 96361 HYDRATE IV INFUSION ADD-ON: CPT

## 2019-05-14 PROCEDURE — 63600175 PHARM REV CODE 636 W HCPCS: Performed by: INTERNAL MEDICINE

## 2019-05-14 RX ORDER — DIPHENOXYLATE HYDROCHLORIDE AND ATROPINE SULFATE 2.5; .025 MG/1; MG/1
TABLET ORAL
Qty: 100 TABLET | Refills: 0 | Status: SHIPPED | OUTPATIENT
Start: 2019-05-14 | End: 2019-05-28 | Stop reason: SDUPTHER

## 2019-05-14 RX ORDER — SODIUM CHLORIDE AND POTASSIUM CHLORIDE 150; 900 MG/100ML; MG/100ML
INJECTION, SOLUTION INTRAVENOUS CONTINUOUS
Status: CANCELLED
Start: 2019-06-18

## 2019-05-14 RX ORDER — ACETAMINOPHEN 325 MG/1
650 TABLET ORAL
Status: CANCELLED | OUTPATIENT
Start: 2019-06-18

## 2019-05-14 RX ORDER — IPRATROPIUM BROMIDE AND ALBUTEROL SULFATE 2.5; .5 MG/3ML; MG/3ML
3 SOLUTION RESPIRATORY (INHALATION)
Status: DISPENSED | OUTPATIENT
Start: 2019-05-14 | End: 2019-05-14

## 2019-05-14 RX ORDER — DRONABINOL 5 MG/1
5 CAPSULE ORAL
Qty: 60 CAPSULE | Refills: 0 | Status: SHIPPED | OUTPATIENT
Start: 2019-05-14 | End: 2019-06-14 | Stop reason: SDUPTHER

## 2019-05-14 RX ORDER — HEPARIN 100 UNIT/ML
500 SYRINGE INTRAVENOUS
Status: ACTIVE | OUTPATIENT
Start: 2019-05-14 | End: 2019-05-14

## 2019-05-14 RX ORDER — DIPHENOXYLATE HYDROCHLORIDE AND ATROPINE SULFATE 2.5; .025 MG/1; MG/1
1 TABLET ORAL 4 TIMES DAILY PRN
Qty: 90 TABLET | Refills: 0 | Status: SHIPPED | OUTPATIENT
Start: 2019-05-14 | End: 2019-05-21 | Stop reason: SDUPTHER

## 2019-05-14 RX ORDER — SODIUM CHLORIDE 0.9 % (FLUSH) 0.9 %
10 SYRINGE (ML) INJECTION
Status: CANCELLED | OUTPATIENT
Start: 2019-06-18

## 2019-05-14 RX ORDER — METHYLPREDNISOLONE SOD SUCC 125 MG
40 VIAL (EA) INJECTION
Status: CANCELLED | OUTPATIENT
Start: 2019-06-18

## 2019-05-14 RX ORDER — IPRATROPIUM BROMIDE AND ALBUTEROL SULFATE 2.5; .5 MG/3ML; MG/3ML
3 SOLUTION RESPIRATORY (INHALATION)
Status: CANCELLED | OUTPATIENT
Start: 2019-06-18

## 2019-05-14 RX ORDER — HEPARIN 100 UNIT/ML
500 SYRINGE INTRAVENOUS
Status: CANCELLED | OUTPATIENT
Start: 2019-06-18

## 2019-05-14 RX ORDER — ACETAMINOPHEN 325 MG/1
650 TABLET ORAL
Status: ACTIVE | OUTPATIENT
Start: 2019-05-14 | End: 2019-05-14

## 2019-05-14 RX ORDER — HEPARIN 100 UNIT/ML
500 SYRINGE INTRAVENOUS
Status: COMPLETED | OUTPATIENT
Start: 2019-05-14 | End: 2019-05-14

## 2019-05-14 RX ORDER — EPINEPHRINE 1 MG/ML
0.3 INJECTION, SOLUTION, CONCENTRATE INTRAVENOUS
Status: CANCELLED | OUTPATIENT
Start: 2019-06-18

## 2019-05-14 RX ORDER — SODIUM CHLORIDE 0.9 % (FLUSH) 0.9 %
10 SYRINGE (ML) INJECTION
Status: DISCONTINUED | OUTPATIENT
Start: 2019-05-14 | End: 2019-05-14 | Stop reason: HOSPADM

## 2019-05-14 RX ORDER — METHYLPREDNISOLONE SOD SUCC 125 MG
40 VIAL (EA) INJECTION
Status: ACTIVE | OUTPATIENT
Start: 2019-05-14 | End: 2019-05-14

## 2019-05-14 RX ORDER — DIPHENHYDRAMINE HYDROCHLORIDE 50 MG/ML
25 INJECTION INTRAMUSCULAR; INTRAVENOUS
Status: CANCELLED | OUTPATIENT
Start: 2019-06-18

## 2019-05-14 RX ORDER — DIPHENHYDRAMINE HYDROCHLORIDE 50 MG/ML
25 INJECTION INTRAMUSCULAR; INTRAVENOUS
Status: ACTIVE | OUTPATIENT
Start: 2019-05-14 | End: 2019-05-14

## 2019-05-14 RX ADMIN — SODIUM CHLORIDE: 0.9 INJECTION, SOLUTION INTRAVENOUS at 10:05

## 2019-05-14 RX ADMIN — SODIUM CHLORIDE: 0.9 INJECTION, SOLUTION INTRAVENOUS at 11:05

## 2019-05-14 RX ADMIN — HEPARIN 500 UNITS: 100 SYRINGE at 12:05

## 2019-05-14 RX ADMIN — VEDOLIZUMAB 300 MG: 300 INJECTION, POWDER, LYOPHILIZED, FOR SOLUTION INTRAVENOUS at 11:05

## 2019-05-14 NOTE — PLAN OF CARE
Problem: Adult Inpatient Plan of Care  Goal: Patient-Specific Goal (Individualization)  Outcome: Outcome(s) achieved Date Met: 05/14/19  2L IVF + Entyvio infusions complete. Pt tolerated well. VSS. NAD. Port to left chest de-accessed after heparinized per protocol.  Pt verbalized understanding of discharge instructions before leaving with self.

## 2019-05-15 ENCOUNTER — HOSPITAL ENCOUNTER (OUTPATIENT)
Dept: RADIOLOGY | Facility: HOSPITAL | Age: 39
Discharge: HOME | End: 2019-05-15
Attending: INTERNAL MEDICINE
Payer: MEDICAID

## 2019-05-15 DIAGNOSIS — K50.019 CROHN'S DISEASE OF SMALL INTESTINE WITH COMPLICATION: Chronic | ICD-10-CM

## 2019-05-15 PROCEDURE — 25500020 PHARM REV CODE 255: Performed by: INTERNAL MEDICINE

## 2019-05-15 PROCEDURE — 72197 MRI PELVIS W/O & W/DYE: CPT | Mod: 26,,, | Performed by: RADIOLOGY

## 2019-05-15 PROCEDURE — 74183 MRI ENTEROGRAPHY: ICD-10-PCS | Mod: 26,,, | Performed by: RADIOLOGY

## 2019-05-15 PROCEDURE — 72197 MRI PELVIS W/O & W/DYE: CPT | Mod: TC

## 2019-05-15 PROCEDURE — 74183 MRI ABD W/O CNTR FLWD CNTR: CPT | Mod: 26,,, | Performed by: RADIOLOGY

## 2019-05-15 PROCEDURE — 72197 MRI ENTEROGRAPHY: ICD-10-PCS | Mod: 26,,, | Performed by: RADIOLOGY

## 2019-05-15 PROCEDURE — A9585 GADOBUTROL INJECTION: HCPCS | Performed by: INTERNAL MEDICINE

## 2019-05-15 RX ORDER — GADOBUTROL 604.72 MG/ML
6 INJECTION INTRAVENOUS
Status: COMPLETED | OUTPATIENT
Start: 2019-05-15 | End: 2019-05-15

## 2019-05-15 RX ADMIN — GADOBUTROL 6 ML: 604.72 INJECTION INTRAVENOUS at 10:05

## 2019-05-15 NOTE — PROGRESS NOTES
Pt port flushed with 10 mL of NS followed by Heparin flush (500 units in 5 mL NS) per protocol, port de-acsessed.  Band-Aid placed to site.

## 2019-05-15 NOTE — PROGRESS NOTES
Pt arrives to MRI for port to be accessed, Pt reports she has a power port, power port accessed with power port needle under sterile technique.   Port flushed with saline, blood return noted.

## 2019-05-16 ENCOUNTER — PATIENT MESSAGE (OUTPATIENT)
Dept: GASTROENTEROLOGY | Facility: CLINIC | Age: 39
End: 2019-05-16

## 2019-05-16 ENCOUNTER — PATIENT MESSAGE (OUTPATIENT)
Dept: GYNECOLOGIC ONCOLOGY | Facility: CLINIC | Age: 39
End: 2019-05-16

## 2019-05-20 ENCOUNTER — TELEPHONE (OUTPATIENT)
Dept: GYNECOLOGIC ONCOLOGY | Facility: CLINIC | Age: 39
End: 2019-05-20

## 2019-05-20 DIAGNOSIS — R19.00 PELVIC MASS: Primary | ICD-10-CM

## 2019-05-20 NOTE — TELEPHONE ENCOUNTER
Spoke with pt. Informed her that the surgery with Dr German is going to be doing with Dr Parsons will be scheduled for May 30 at [facility]. Pt verbalized understanding. Informed pt that consents need to be signed before procedure. Pt states that she can come to [facility] 5/21 after work to sign consents. Pt denies any further questions and all paperwork regarding surgery will be given to pt tomorrow when pt signs consents.

## 2019-05-21 ENCOUNTER — OFFICE VISIT (OUTPATIENT)
Dept: GYNECOLOGIC ONCOLOGY | Facility: CLINIC | Age: 39
End: 2019-05-21
Payer: MEDICAID

## 2019-05-21 VITALS
HEART RATE: 81 BPM | HEIGHT: 61 IN | SYSTOLIC BLOOD PRESSURE: 118 MMHG | BODY MASS INDEX: 22.6 KG/M2 | WEIGHT: 119.69 LBS | DIASTOLIC BLOOD PRESSURE: 87 MMHG

## 2019-05-21 DIAGNOSIS — R19.00 PELVIC MASS: Primary | ICD-10-CM

## 2019-05-21 DIAGNOSIS — K50.019 CROHN'S DISEASE OF SMALL INTESTINE WITH COMPLICATION: ICD-10-CM

## 2019-05-21 DIAGNOSIS — Z93.2 S/P ILEOSTOMY: Chronic | ICD-10-CM

## 2019-05-21 PROCEDURE — 99999 PR PBB SHADOW E&M-EST. PATIENT-LVL III: CPT | Mod: PBBFAC,,, | Performed by: OBSTETRICS & GYNECOLOGY

## 2019-05-21 PROCEDURE — 99213 OFFICE O/P EST LOW 20 MIN: CPT | Mod: S$PBB,,, | Performed by: OBSTETRICS & GYNECOLOGY

## 2019-05-21 PROCEDURE — 99999 PR PBB SHADOW E&M-EST. PATIENT-LVL III: ICD-10-PCS | Mod: PBBFAC,,, | Performed by: OBSTETRICS & GYNECOLOGY

## 2019-05-21 PROCEDURE — 99213 PR OFFICE/OUTPT VISIT, EST, LEVL III, 20-29 MIN: ICD-10-PCS | Mod: S$PBB,,, | Performed by: OBSTETRICS & GYNECOLOGY

## 2019-05-21 PROCEDURE — 99213 OFFICE O/P EST LOW 20 MIN: CPT | Mod: PBBFAC | Performed by: OBSTETRICS & GYNECOLOGY

## 2019-05-21 NOTE — H&P (VIEW-ONLY)
Subjective:      Patient ID: Ivy Salgado is a 38 y.o. female.    Chief Complaint: Pelvic Mass (sign surgery consents)      HPI  Referred by Dr. Alix Morales for bilateral ovarian cysts and complex surgical history.      Has Crohn's disease with multiple prior intestinal procedures with resultant total colectomy and end ileostomy in 2012 with Dr. Parsons. In 2015 underwent SHELLIE/RSO, review of operative report note severe adhesive disease with incidental cystotomy and unable to visualize left adnexal due to adhesive disease. GI physician Dr. Ross.      More recently developed worsening pelvic pain.   12/6/18 CT A&P  Impression       1.  Several bilateral renal hypodensities, favored to represent cysts, unchanged from prior CT.  2.  Nonobstructive bilateral nephrolithiasis.  3.  Right adnexal complex cystic structure measuring 6.2 x 0.5 x 6.5 cm, similar to a pelvic ultrasound 07/03/2018.  Once again, as noted on the patient's prior ultrasound examination, this may represent a peritoneal inclusion cyst or ovarian cyst from a remnant of ovarian tissue in this patient status post right oophorectomy and hysterectomy.      Pelvic US 4/9/19  Impression       1. Redemonstration of complex left ovarian cystic lesion with suspected internal solid component.  This appears stable to mildly increased in size compared to previous pelvic ultrasound from 03/19/2019, allowing for interobserver variability.  Ob GYN follow-up is recommended if not previously obtained.  2. Right adnexal septated cystic structure versus adjacent cysts.  3. Postsurgical changes of hysterectomy and right oophorectomy.      Family history significant for maternal aunt with breast cancer, maternal aunt with endometrial cancer, father with colon cancer, and paternal grandmother with esophageal cancer. 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 Has plans to start additional schooling in August and a vacation planning in July.      MRI 5/15/19  Impression        Status post changes of proctocolectomy with right lower quadrant ileostomy.  No evidence for active inflammatory bowel disease.    Right adnexal cystic lesion, similar to slightly decreased in size from the 12/06/2018 CT exam.    Large (7.9 by 5.5 cm) left adnexal cystic lesion with small area of peripheral enhancement, possible residual ovarian tissue, grossly similar to the 04/09/2019 pelvic ultrasound.  gynecologic follow-up recommended.     Review of Systems   Constitutional: Negative for appetite change, chills, fatigue and fever.   HENT: Negative for mouth sores.    Respiratory: Negative for cough and shortness of breath.    Cardiovascular: Negative for leg swelling.   Gastrointestinal: Negative for abdominal pain, blood in stool, constipation and diarrhea.   Endocrine: Negative for cold intolerance.   Genitourinary: Positive for pelvic pain. Negative for dysuria and vaginal bleeding.   Musculoskeletal: Negative for myalgias.   Skin: Negative for rash.   Allergic/Immunologic: Negative.    Neurological: Negative for weakness and numbness.   Hematological: Negative for adenopathy. Does not bruise/bleed easily.   Psychiatric/Behavioral: Negative for confusion.       Objective:   Physical Exam:   Constitutional: She is oriented to person, place, and time. She appears well-developed and well-nourished.    HENT:   Head: Normocephalic and atraumatic.    Eyes: Pupils are equal, round, and reactive to light. EOM are normal.    Neck: Normal range of motion. Neck supple. No thyromegaly present.    Cardiovascular: Normal rate, regular rhythm and intact distal pulses.     Pulmonary/Chest: Effort normal and breath sounds normal. No respiratory distress. She has no wheezes.        Abdominal: Soft. Bowel sounds are normal. She exhibits no distension, no ascites and no mass. There is no tenderness.   RLQ ileostomy             Musculoskeletal: Normal range of motion and moves all extremeties.      Lymphadenopathy:     She  has no cervical adenopathy.        Right: No supraclavicular adenopathy present.        Left: No supraclavicular adenopathy present.    Neurological: She is alert and oriented to person, place, and time.    Skin: Skin is warm and dry. No rash noted.    Psychiatric: She has a normal mood and affect.       Assessment:   No diagnosis found.    Plan:   No orders of the defined types were placed in this encounter.    Long discussion with the patient regarding recurring ovarian cysts and pelvic pain. Ultimately she desires definitive management with BSO/resection of ovarian remnant. Complex surgical history with multiple prior abdominal surgeries and has end ileostomy. We discussed the R/B/A to surgery including but not limited to definitive surgical resection, possible recurrence, fistula formation, bowel or urologic injury. Case has been reviewed with Dr. Parsons who created her end ileostomy.     Plan for xlap/BSO 5/30/19.     The risks, benefits, and indications of the procedure were discussed with the patient and her family members if present.  These included bleeding, transfusion, infection, damage to surrounding tissues (bowel, bladder, ureter), wound separation, perioperative cardiac events, VTE, pneumonia, and possible death.  She voiced understanding, all questions were answered and consents were signed.

## 2019-05-22 ENCOUNTER — ANESTHESIA EVENT (OUTPATIENT)
Dept: SURGERY | Facility: HOSPITAL | Age: 39
DRG: 742 | End: 2019-05-22
Payer: MEDICAID

## 2019-05-22 DIAGNOSIS — Z01.818 PREOPERATIVE TESTING: Primary | ICD-10-CM

## 2019-05-22 RX ORDER — LIDOCAINE HYDROCHLORIDE 10 MG/ML
1 INJECTION, SOLUTION EPIDURAL; INFILTRATION; INTRACAUDAL; PERINEURAL ONCE
Status: CANCELLED | OUTPATIENT
Start: 2019-05-22 | End: 2019-05-22

## 2019-05-22 RX ORDER — SODIUM CHLORIDE 9 MG/ML
INJECTION, SOLUTION INTRAVENOUS CONTINUOUS
Status: CANCELLED | OUTPATIENT
Start: 2019-05-22

## 2019-05-22 NOTE — PRE ADMISSION SCREENING
Anesthesia Assessment: Preoperative EQUATION    Planned Procedure: Procedure(s) (LRB):  XI ROBOTIC SALPINGO-OOPHORECTOMY (Bilateral)  LAPAROTOMY, EXPLORATORY (N/A)  Requested Anesthesia Type:General  Surgeon: Rupa German MD  Service: OB/GYN  Known or anticipated Date of Surgery:5/30/2019    Surgeon notes: reviewed    Previous anesthesia records:GETA, MAC and No problems  8/8/2017 Ileoscopy  Airway/Jaw/Neck:  Airway Findings: Mouth Opening: Normal Tongue: Normal  General Airway Assessment: Adult  Mallampati: II  TM Distance: Normal, at least 6 cm        Last PCP note: within 3 months , within [facility] Dr. [facility]  Subspecialty notes: Gastroenterology    Other important co-morbidities: PER Epic:  Crohn's Disease, HTN, Arthritis, Anxiety/depression     Tests already available:  Available tests,  within 3 months , within [facility] .  4/9/2019 CMP, CBC with diff, EKG; 8/2018 2D Echo            Instructions given. (See in Nurse's note)    Optimization:  Anesthesia Preop Clinic Assessment  Indicated.    Medical Opinion Indicated.           Plan:    Testing:  T&S   Pre-anesthesia  visit       Visit focus: concerns in complex and/or prolonged anesthesia     Consultation:IM Perioperative Hospitalist     Patient  has previously scheduled Medical Appointment: 5/23/2019    Navigation: Tests Scheduled.              Consults scheduled.             Results will be tracked by Preop Clinic.

## 2019-05-22 NOTE — ANESTHESIA PREPROCEDURE EVALUATION
Dot Salmon, RN   Registered Nurse      Pre Admission Screening   Signed                     Anesthesia Assessment: Preoperative EQUATION     Planned Procedure: Procedure(s) (LRB):  XI ROBOTIC SALPINGO-OOPHORECTOMY (Bilateral)  LAPAROTOMY, EXPLORATORY (N/A)  Requested Anesthesia Type:General  Surgeon: Rupa German MD  Service: OB/GYN  Known or anticipated Date of Surgery:5/30/2019     Surgeon notes: reviewed     Previous anesthesia records:GETA, MAC and No problems  8/8/2017 Ileoscopy  Airway/Jaw/Neck:  Airway Findings: Mouth Opening: Normal Tongue: Normal  General Airway Assessment: Adult  Mallampati: II  TM Distance: Normal, at least 6 cm         Last PCP note: within 3 months , within [facility]  Subspecialty notes: Gastroenterology     Other important co-morbidities: PER Epic:  Crohn's Disease, HTN, Arthritis, Anxiety/depression     Tests already available:  Available tests,  within 3 months , within [facility] .  4/9/2019 CMP, CBC with diff, EKG; 8/2018 2D Echo                            Instructions given. (See in Nurse's note)     Optimization:  Anesthesia Preop Clinic Assessment  Indicated.    Medical Opinion Indicated.                                        Plan:    Testing:  T&S   Pre-anesthesia  visit                                        Visit focus: concerns in complex and/or prolonged anesthesia                           Consultation:IM Perioperative Hospitalist                           Patient  has previously scheduled Medical Appointment: 5/23/2019     Navigation: Tests Scheduled.                         Consults scheduled.                        Results will be tracked by Preop Clinic.        5/28/2019 0923   Chart/ case reviewed by Dr. Georges, Anesthesia visit not indicated.  Pt called and informed of cancellation and to maintain appt for clearance.    Argentina Aguirre, RN BSN    5/29/2019  Per Dr. Samuel:  No further cardiac work up is indicated prior to proceeding with the  surgery. Patient is optimized.    The patient was seen by Perioperative Internal Medicine physician Dr. Samuel on 5/29/2019, please refer to additional recommendations for the Perioperative Procedure.    Please note: Anesthesia visit was indicated and required, Pam 3 for GETA of 240 minutes.  Dr. Georges reviewed chart and waived scheduled vist.    MIKE Dan                                                                                                             05/22/2019  [facility]  Anesthesia Pre-Operative Evaluation         Patient Name: Ivy Salgado  YOB: 1980  MRN: [id number]    SUBJECTIVE:     Pre-operative evaluation for Procedure(s) (LRB):  SALPINGO-OOPHORECTOMY, BILATERAL (Bilateral)  LAPAROTOMY, EXPLORATORY (N/A)     05/29/2019    Ivy Salgado is a 38 y.o. female w/ a significant PMHx of HTN, Anxiety, and Crohn's s/p multiple intraabdominal and pelvic surgeries who now presents with a pelvic mass.    Patient now presents for the above procedure(s).      LDA:        Ileostomy 06/16/12 0000 RLQ (Active)   Number of days: 2538            Ileostomy RLQ (Active)   Number of days:        Prev airway:     Method of Intubation: Direct laryngoscopy; Inserted by: CRNA; Airway Device: Endotracheal Tube; Mask Ventilation: Easy; Intubated: Postinduction; Blade: Sarmiento #2; Airway Device Size: 7.5; Style: Cuffed; Cuff Inflation: Minimal occlusive pressure; Placement Verified By: Auscultation, Capnometry; Grade: Grade I; Complicating Factors: None; Intubation Findings: Positive EtCO2, Bilateral breath sounds, Atraumatic/Condition of teeth unchanged; Securment: Lips; Complications: None    Drips: None documented.      Patient Active Problem List   Diagnosis    Pelvic mass    S/P ileostomy    Crohn's disease of small intestine    Generalized anxiety disorder    Herpes genitalis in women    Ovarian cyst    Adnexal adhesions    Adhesions of uterus    Cyst of  right ovary    Joint pain    Fatigue    Abdominal pain    Crohn's disease of small intestine with complication    Current mild episode of major depressive disorder without prior episode    Granulomatous colitis    Appetite impaired    Atrial tachycardia    Recurrent UTI    Low bone density    Vitamin D deficiency    Palpitations    Sinus tachycardia    Multiple thyroid nodules    Urinary tract infection with hematuria    Osteopenia of multiple sites    Diarrhea    Transient neurological symptoms    Chronic, continuous use of opioids    Acid reflux    SOBOE (shortness of breath on exertion)    Poor venous access    Alkaline phosphatase elevation- based on lab from 4/9/2019        Review of patient's allergies indicates:   Allergen Reactions    Azathioprine sodium Other (See Comments)     Other reaction(s): pancreatitis  Other reaction(s): pancreatitis    Methotrexate analogues Other (See Comments)     leukopenia    Stelara [ustekinumab] Other (See Comments)     Multiple infections    Zofran [ondansetron hcl (pf)] Other (See Comments)     Per patient causes prolong QT    Vancomycin analogues Hives    Morphine Itching and Other (See Comments)     Other reaction(s): Itching    Bactrim [sulfamethoxazole-trimethoprim] Palpitations    Ciprofloxacin Palpitations       Current Inpatient Medications:      No current facility-administered medications on file prior to encounter.      Current Outpatient Medications on File Prior to Encounter   Medication Sig Dispense Refill    multivitamin (ONE DAILY MULTIVITAMIN) per tablet Take 1 tablet by mouth once daily.      cephALEXin (KEFLEX) 250 MG capsule Take 1 capsule (250 mg total) by mouth once daily. 30 capsule 5    clindamycin (CLINDESSE) 2 % vaginal cream PLACE VAGINALLY EVERY EVENING 40 g 0    dronabinol (MARINOL) 5 MG capsule Take 1 capsule (5 mg total) by mouth 2 (two) times daily before meals. 60 capsule 0    food supplemt, lactose-reduced  (BOOST BREEZE NUTRITIONAL) 0.04-1.05 gram-kcal/mL Liqd Use 3 times per day (Patient taking differently: Use 1 time per day) 6399 mL 5    loperamide (IMODIUM) 2 mg capsule Take 2 mg by mouth daily as needed for Diarrhea.      mirtazapine (REMERON SOL-TAB) 30 MG disintegrating tablet Take 1 tablet (30 mg total) by mouth every evening. 30 tablet 5    oxyCODONE-acetaminophen (PERCOCET)  mg per tablet Take 1 tablet by mouth every 6 (six) hours as needed for Pain. (Patient taking differently: Take 0.5-1 tablets by mouth every 6 (six) hours as needed for Pain. ) 40 tablet 0    potassium citrate (UROCIT-K 15) 15 mEq TbSR Take 2 tablets by mouth 2 (two) times daily. 360 tablet 3    promethazine (PHENERGAN) 25 MG tablet Take 1 tablet (25 mg total) by mouth every 6 (six) hours as needed. 30 tablet 3    terconazole (TERAZOL 7) 0.4 % Crea INSERT ONE APPLICATORFUL VAGINALLY AT BEDTIME 45 g 0    valACYclovir (VALTREX) 500 MG tablet TAKE 1 TABLET BY MOUTH EVERY DAY 90 tablet 2       Past Surgical History:   Procedure Laterality Date    ABDOMINAL SURGERY      APPENDECTOMY      BLADDER SURGERY      partial cystectomy due to fistula    CKC      COLON SURGERY      COLONOSCOPY      EGD (ESOPHAGOGASTRODUODENOSCOPY) N/A 9/6/2013    Performed by [facility] at [facility] (4TH FLR)    ESOPHAGOGASTRODUODENOSCOPY (EGD) N/A 10/14/2016    Performed by Janelle Mcpherson [facility] at [facility] (2ND FLR)    ESOPHAGOGASTRODUODENOSCOPY (EGD) N/A 10/23/2014    Performed by Nathanael Mitchell MD at [facility] (2ND FLR)    EXAM UNDER ANESTHESIA N/A 8/29/2013    Performed by Bob Parsons [facility] at [facility] OR 2ND FLR    EXAM UNDER ANESTHESIA N/A 1/18/2013    Performed by [facility] at [facility] OR 2ND FLR    HYSTERECTOMY-ABDOMINAL-TOTAL (SHELLIE) N/A 4/16/2015    [facility]    ILEOSCOPY N/A 8/8/2017    [facility] at [facility] (2[facility] FLR)    ILEOSCOPY N/A 10/14/2016    Performed by  [facility] at [facility] (2ND FLR)    ILEOSCOPY N/A 9/25/2013    Performed by [facility] at [facility] (4TH FLR)    ILEOSTOMY      INCISION AND DRAINAGE, ABSCESS N/A 8/29/2013    Performed by [facility] at [facility] OR 2ND FLR    JVLCEURDB-FBDJ-O-CATH Left 2/21/2017    Performed by Parish Sanchez Jr., MD at [facility] OR 2ND FLR    OOPHORECTOMY Right 04/16/2015    PORTACATH PLACEMENT  02/21/2017    REPAIR-BLADDER  4/16/2015    [facility]    SALPINGO-OOPHERECTOMY Right 4/16/2015    [facility] at [facility] OR    SIGMOIDOSCOPY, FLEXIBLE N/A 2/7/2014    Performed by Erasto Sewell [facility] at [facility] (2ND FLR)    SKIN BIOPSY      SMALL INTESTINE SURGERY      age 18 Y    TOTAL ABDOMINAL HYSTERECTOMY  04/16/2015    TOTAL COLECTOMY      TUBAL LIGATION  06/06/2012    UPPER GASTROINTESTINAL ENDOSCOPY         Social History     Socioeconomic History    Marital status: Single     Spouse name: Not on file    Number of children: Not on file    Years of education: Not on file    Highest education level: Not on file   Occupational History     Employer: [employer]   Social Needs    Financial resource strain: Not on file    Food insecurity:     Worry: Not on file     Inability: Not on file    Transportation needs:     Medical: Not on file     Non-medical: Not on file   Tobacco Use    Smoking status: Never Smoker    Smokeless tobacco: Never Used   Substance and Sexual Activity    Alcohol use: Not Currently     Alcohol/week: 0.0 oz    Drug use: No    Sexual activity: Yes     Partners: Male     Birth control/protection: Surgical     Comment: HYST   Lifestyle    Physical activity:     Days per week: Not on file     Minutes per session: Not on file    Stress: Not on file   Relationships    Social connections:     Talks on phone: Not on file     Gets together: Not on file     Attends Yazdanism service: Not on file      Active member of club or organization: Not on file     Attends meetings of clubs or organizations: Not on file     Relationship status: Not on file   Other Topics Concern    Are you pregnant or think you may be? No    Breast-feeding Not Asked   Social History Narrative    Not on file       OBJECTIVE:     Vital Signs Range (Last 24H):         Significant Labs:  Lab Results   Component Value Date    WBC 7.10 04/09/2019    HGB 12.5 04/09/2019    HCT 38.1 04/09/2019     04/09/2019    CHOL 225 (H) 10/04/2016    TRIG 149 10/04/2016    HDL 62 10/04/2016    ALT 26 04/09/2019    AST 28 04/09/2019     04/09/2019    K 3.8 04/09/2019     04/09/2019    CREATININE 0.8 04/09/2019    BUN 9 04/09/2019    CO2 25 04/09/2019    TSH 1.526 12/11/2018    INR 1.1 02/02/2018    HGBA1C 4.8 09/18/2018       Diagnostic Studies: No relevant studies.    EKG:     Vent. Rate : 090 BPM     Atrial Rate : 096 BPM     P-R Int : 122 ms          QRS Dur : 068 ms      QT Int : 350 ms       P-R-T Axes : 066 057 061 degrees     QTc Int : 429 ms    Normal sinus rhythm  Normal ECG    Confirmed by Lele HENDERSON, Tari ([id number]) on 4/10/2019 10:32:18 AM        2D ECHO:  Results for orders placed or performed during the hospital encounter of 08/30/18   2D echo with color flow doppler   Result Value Ref Range    QEF 60 55 - 65    Mitral Valve Regurgitation TRIVIAL     Diastolic Dysfunction No     Est. PA Systolic Pressure 21.66     Tricuspid Valve Regurgitation TRIVIAL          ASSESSMENT/PLAN:         Anesthesia Evaluation    I have reviewed the Patient Summary Reports.     I have reviewed the Medications.     Review of Systems  Anesthesia Hx:  No problems with previous Anesthesia  History of prior surgery of interest to airway management or planning: Denies Family Hx of Anesthesia complications.   Denies Personal Hx of Anesthesia complications.   Social:  Non-Smoker, No Alcohol Use    Hematology/Oncology:  Hematology Normal   Oncology Normal      EENT/Dental:EENT/Dental Normal   Cardiovascular:   Hypertension Denies MI.          Pulmonary:   Denies COPD.  Denies Asthma.    Renal/:   renal calculi    Hepatic/GI:   GERD Crohn's Disease   Neurological:  Neurology Normal  Denies CVA. Denies Seizures.    Endocrine:  Endocrine Normal Denies Diabetes.    Psych:   anxiety depression          Physical Exam  General:  Well nourished    Airway/Jaw/Neck:  Airway Findings: Mouth Opening: Normal Tongue: Normal  General Airway Assessment: Adult  Mallampati: I  Improves to I with phonation.  TM Distance: Normal, at least 6 cm        Eyes/Ears/Nose:  EYES/EARS/NOSE FINDINGS: Normal   Dental:  Dental Findings: In tact   Chest/Lungs:  Chest/Lungs Findings: Clear to auscultation, Normal Respiratory Rate     Heart/Vascular:  Heart Findings: Rate: Normal  Rhythm: Regular Rhythm        Mental Status:  Mental Status Findings:  Cooperative, Alert and Oriented         Anesthesia Plan  Type of Anesthesia, risks & benefits discussed:  Anesthesia Type:  general  Patient's Preference:   Intra-op Monitoring Plan: standard ASA monitors  Intra-op Monitoring Plan Comments:   Post Op Pain Control Plan: per primary service following discharge from PACU, IV/PO Opioids PRN and multimodal analgesia  Post Op Pain Control Plan Comments:   Induction:   IV  Beta Blocker:  Patient is not currently on a Beta-Blocker (No further documentation required).       Informed Consent: Patient understands risks and agrees with Anesthesia plan.  Questions answered. Anesthesia consent signed with patient.  ASA Score: 2     Day of Surgery Review of History & Physical:    H&P update referred to the provider.         Ready For Surgery From Anesthesia Perspective.

## 2019-05-22 NOTE — PRE-PROCEDURE INSTRUCTIONS
Instructed to hold vitamins, herbal supplements and NSAIDS one week prior to surgery;  Patient verbalized understanding.

## 2019-05-23 ENCOUNTER — OFFICE VISIT (OUTPATIENT)
Dept: DERMATOLOGY | Facility: CLINIC | Age: 39
End: 2019-05-23
Payer: MEDICAID

## 2019-05-23 DIAGNOSIS — D48.5 NEOPLASM OF UNCERTAIN BEHAVIOR OF SKIN: Primary | ICD-10-CM

## 2019-05-23 PROCEDURE — 88342 TISSUE SPECIMEN TO PATHOLOGY, DERMATOLOGY: ICD-10-PCS | Mod: 26,,, | Performed by: PATHOLOGY

## 2019-05-23 PROCEDURE — 88305 TISSUE EXAM BY PATHOLOGIST: CPT | Mod: 26,,, | Performed by: PATHOLOGY

## 2019-05-23 PROCEDURE — 99499 NO LOS: ICD-10-PCS | Mod: S$GLB,,, | Performed by: DERMATOLOGY

## 2019-05-23 PROCEDURE — 99499 UNLISTED E&M SERVICE: CPT | Mod: S$GLB,,, | Performed by: DERMATOLOGY

## 2019-05-23 PROCEDURE — 11102 PR TANGENTIAL BIOPSY, SKIN, SINGLE LESION: ICD-10-PCS | Mod: S$GLB,,, | Performed by: DERMATOLOGY

## 2019-05-23 PROCEDURE — 88342 IMHCHEM/IMCYTCHM 1ST ANTB: CPT | Performed by: PATHOLOGY

## 2019-05-23 PROCEDURE — 88305 TISSUE EXAM BY PATHOLOGIST: CPT | Performed by: PATHOLOGY

## 2019-05-23 PROCEDURE — 88305 TISSUE SPECIMEN TO PATHOLOGY, DERMATOLOGY: ICD-10-PCS | Mod: 26,,, | Performed by: PATHOLOGY

## 2019-05-23 PROCEDURE — 11102 TANGNTL BX SKIN SINGLE LES: CPT | Mod: S$GLB,,, | Performed by: DERMATOLOGY

## 2019-05-23 PROCEDURE — 88342 IMHCHEM/IMCYTCHM 1ST ANTB: CPT | Mod: 26,,, | Performed by: PATHOLOGY

## 2019-05-23 NOTE — PROGRESS NOTES
Subjective:       Patient ID:  Ivy Salgado is a 38 y.o. female who presents for   Chief Complaint   Patient presents with    Mole     abdomen     Mole  - Initial  Affected locations: abdomen  Duration: 35 years  Signs / symptoms: growing (changing)  Severity: mild to moderate  Timing: constant  Exacerbated by: ostomy bag.  Relieving factors/Treatments tried: nothing      Review of Systems   Musculoskeletal: Negative for joint swelling and arthralgias.   Skin: Negative for itching, rash and recent sunburn.   Hematologic/Lymphatic: Does not bruise/bleed easily.        Objective:    Physical Exam   Constitutional: She appears well-developed and well-nourished. No distress.   Neurological: She is alert and oriented to person, place, and time. She is not disoriented.   Psychiatric: She has a normal mood and affect.   Skin:   Areas Examined (abnormalities noted in diagram):   Abdomen Inspection Performed              Diagram Legend     Erythematous scaling macule/papule c/w actinic keratosis       Vascular papule c/w angioma      Pigmented verrucoid papule/plaque c/w seborrheic keratosis      Yellow umbilicated papule c/w sebaceous hyperplasia      Irregularly shaped tan macule c/w lentigo     1-2 mm smooth white papules consistent with Milia      Movable subcutaneous cyst with punctum c/w epidermal inclusion cyst      Subcutaneous movable cyst c/w pilar cyst      Firm pink to brown papule c/w dermatofibroma      Pedunculated fleshy papule(s) c/w skin tag(s)      Evenly pigmented macule c/w junctional nevus     Mildly variegated pigmented, slightly irregular-bordered macule c/w mildly atypical nevus      Flesh colored to evenly pigmented papule c/w intradermal nevus       Pink pearly papule/plaque c/w basal cell carcinoma      Erythematous hyperkeratotic cursted plaque c/w SCC      Surgical scar with no sign of skin cancer recurrence      Open and closed comedones      Inflammatory papules and pustules       Verrucoid papule consistent consistent with wart     Erythematous eczematous patches and plaques     Dystrophic onycholytic nail with subungual debris c/w onychomycosis     Umbilicated papule    Erythematous-base heme-crusted tan verrucoid plaque consistent with inflamed seborrheic keratosis     Erythematous Silvery Scaling Plaque c/w Psoriasis     See annotation      Assessment / Plan:      Pathology Orders:     Normal Orders This Visit    Tissue Specimen To Pathology, Dermatology     Questions:    Directional Terms:  Other(comment)    Clinical Information:  r/o dysplastic nevus    Specific Site:  right lower abdomen        Neoplasm of uncertain behavior of skin  -     Tissue Specimen To Pathology, Dermatology    Shave biopsy procedure note:  Risk, benefits, and alternatives of biopsy are discussed with the patient, including risk of infection, scar, recurrence, and need for additional treatment of site. The patient agrees to the procedure by verbal consent. The area is marked and prepped with alcohol.  Approximately 1 mL of lidocaine 1% with epinephrine is used for local anesthesia. A sharp blade is used to remove part of the lesion. The specimen is sent for pathology. Hemostasis is obtained with aluminum chloride. The area is then dressed with Vaseline and Tegaderm. The patient tolerated the procedure well without adverse event. Written instructions on wound care were given and were reviewed with the patient, who is to call for any signs of bleeding or infection. The patient will be notified of the pathology results.    Follow up dependent on pathology results.

## 2019-05-27 ENCOUNTER — PATIENT MESSAGE (OUTPATIENT)
Dept: INTERNAL MEDICINE | Facility: CLINIC | Age: 39
End: 2019-05-27

## 2019-05-27 ENCOUNTER — PATIENT MESSAGE (OUTPATIENT)
Dept: GASTROENTEROLOGY | Facility: CLINIC | Age: 39
End: 2019-05-27

## 2019-05-27 DIAGNOSIS — F41.1 GENERALIZED ANXIETY DISORDER: ICD-10-CM

## 2019-05-27 DIAGNOSIS — F41.9 ANXIETY: ICD-10-CM

## 2019-05-27 RX ORDER — ZOLPIDEM TARTRATE 10 MG/1
TABLET ORAL
Qty: 30 TABLET | Refills: 0 | Status: SHIPPED | OUTPATIENT
Start: 2019-05-27 | End: 2019-05-29

## 2019-05-27 RX ORDER — CLONAZEPAM 1 MG/1
1 TABLET ORAL 2 TIMES DAILY PRN
Qty: 60 TABLET | Refills: 0 | Status: SHIPPED | OUTPATIENT
Start: 2019-05-27 | End: 2019-05-29

## 2019-05-28 ENCOUNTER — TELEPHONE (OUTPATIENT)
Dept: GYNECOLOGIC ONCOLOGY | Facility: CLINIC | Age: 39
End: 2019-05-28

## 2019-05-28 ENCOUNTER — PATIENT MESSAGE (OUTPATIENT)
Dept: INTERNAL MEDICINE | Facility: CLINIC | Age: 39
End: 2019-05-28

## 2019-05-28 ENCOUNTER — LAB VISIT (OUTPATIENT)
Dept: LAB | Facility: HOSPITAL | Age: 39
End: 2019-05-28
Attending: ANESTHESIOLOGY
Payer: MEDICAID

## 2019-05-28 ENCOUNTER — INITIAL CONSULT (OUTPATIENT)
Dept: INTERNAL MEDICINE | Facility: CLINIC | Age: 39
End: 2019-05-28
Payer: MEDICAID

## 2019-05-28 ENCOUNTER — PATIENT MESSAGE (OUTPATIENT)
Dept: SURGERY | Facility: HOSPITAL | Age: 39
End: 2019-05-28

## 2019-05-28 ENCOUNTER — PATIENT MESSAGE (OUTPATIENT)
Dept: GASTROENTEROLOGY | Facility: CLINIC | Age: 39
End: 2019-05-28

## 2019-05-28 VITALS
DIASTOLIC BLOOD PRESSURE: 80 MMHG | BODY MASS INDEX: 21.97 KG/M2 | WEIGHT: 116.38 LBS | HEART RATE: 98 BPM | SYSTOLIC BLOOD PRESSURE: 124 MMHG | HEIGHT: 61 IN | OXYGEN SATURATION: 98 % | TEMPERATURE: 101 F

## 2019-05-28 DIAGNOSIS — R74.8 ALKALINE PHOSPHATASE ELEVATION: ICD-10-CM

## 2019-05-28 DIAGNOSIS — K21.9 GASTROESOPHAGEAL REFLUX DISEASE, ESOPHAGITIS PRESENCE NOT SPECIFIED: ICD-10-CM

## 2019-05-28 DIAGNOSIS — Z01.818 PREOPERATIVE TESTING: ICD-10-CM

## 2019-05-28 DIAGNOSIS — F41.1 GENERALIZED ANXIETY DISORDER: ICD-10-CM

## 2019-05-28 DIAGNOSIS — F11.90 CHRONIC, CONTINUOUS USE OF OPIOIDS: ICD-10-CM

## 2019-05-28 DIAGNOSIS — F41.9 ANXIETY: ICD-10-CM

## 2019-05-28 DIAGNOSIS — Z01.818 PREOP EXAMINATION: Primary | ICD-10-CM

## 2019-05-28 DIAGNOSIS — A60.09 HERPES GENITALIS IN WOMEN: ICD-10-CM

## 2019-05-28 DIAGNOSIS — E04.2 MULTIPLE THYROID NODULES: ICD-10-CM

## 2019-05-28 DIAGNOSIS — R19.7 DIARRHEA, UNSPECIFIED TYPE: ICD-10-CM

## 2019-05-28 DIAGNOSIS — K50.819 CROHN'S DISEASE OF SMALL AND LARGE INTESTINES WITH COMPLICATION: ICD-10-CM

## 2019-05-28 DIAGNOSIS — R06.02 SOBOE (SHORTNESS OF BREATH ON EXERTION): ICD-10-CM

## 2019-05-28 DIAGNOSIS — N39.0 RECURRENT UTI: Chronic | ICD-10-CM

## 2019-05-28 DIAGNOSIS — R63.0 APPETITE IMPAIRED: ICD-10-CM

## 2019-05-28 DIAGNOSIS — R29.818 TRANSIENT NEUROLOGICAL SYMPTOMS: ICD-10-CM

## 2019-05-28 DIAGNOSIS — I87.8 POOR VENOUS ACCESS: ICD-10-CM

## 2019-05-28 DIAGNOSIS — E55.9 VITAMIN D DEFICIENCY: ICD-10-CM

## 2019-05-28 DIAGNOSIS — K50.019 CROHN'S DISEASE OF SMALL INTESTINE WITH COMPLICATION: ICD-10-CM

## 2019-05-28 PROBLEM — E83.42 HYPOMAGNESEMIA: Status: RESOLVED | Noted: 2019-01-08 | Resolved: 2019-05-28

## 2019-05-28 PROBLEM — E87.6 HYPOKALEMIA: Status: RESOLVED | Noted: 2018-08-30 | Resolved: 2019-05-28

## 2019-05-28 PROBLEM — D64.9 ANEMIA: Status: RESOLVED | Noted: 2019-01-08 | Resolved: 2019-05-28

## 2019-05-28 PROBLEM — Z79.52 CURRENT CHRONIC USE OF SYSTEMIC STEROIDS: Status: RESOLVED | Noted: 2018-02-03 | Resolved: 2019-05-28

## 2019-05-28 PROBLEM — E44.0 MODERATE MALNUTRITION: Status: RESOLVED | Noted: 2019-01-10 | Resolved: 2019-05-28

## 2019-05-28 PROBLEM — R63.4 WEIGHT LOSS: Status: RESOLVED | Noted: 2019-01-09 | Resolved: 2019-05-28

## 2019-05-28 LAB
ABO + RH BLD: NORMAL
BLD GP AB SCN CELLS X3 SERPL QL: NORMAL

## 2019-05-28 PROCEDURE — 99214 OFFICE O/P EST MOD 30 MIN: CPT | Mod: S$PBB,,, | Performed by: HOSPITALIST

## 2019-05-28 PROCEDURE — 99999 PR PBB SHADOW E&M-EST. PATIENT-LVL III: CPT | Mod: PBBFAC,,, | Performed by: HOSPITALIST

## 2019-05-28 PROCEDURE — 86901 BLOOD TYPING SEROLOGIC RH(D): CPT

## 2019-05-28 PROCEDURE — 36415 COLL VENOUS BLD VENIPUNCTURE: CPT

## 2019-05-28 PROCEDURE — 99213 OFFICE O/P EST LOW 20 MIN: CPT | Mod: PBBFAC,25 | Performed by: HOSPITALIST

## 2019-05-28 PROCEDURE — 99214 PR OFFICE/OUTPT VISIT, EST, LEVL IV, 30-39 MIN: ICD-10-PCS | Mod: S$PBB,,, | Performed by: HOSPITALIST

## 2019-05-28 PROCEDURE — 99999 PR PBB SHADOW E&M-EST. PATIENT-LVL III: ICD-10-PCS | Mod: PBBFAC,,, | Performed by: HOSPITALIST

## 2019-05-28 RX ORDER — IBUPROFEN 400 MG/1
400 TABLET ORAL DAILY PRN
COMMUNITY
End: 2019-06-11

## 2019-05-28 RX ORDER — DIPHENOXYLATE HYDROCHLORIDE AND ATROPINE SULFATE 2.5; .025 MG/1; MG/1
1 TABLET ORAL 4 TIMES DAILY PRN
Qty: 100 TABLET | Refills: 0 | Status: SHIPPED | OUTPATIENT
Start: 2019-05-28 | End: 2019-07-23 | Stop reason: SDUPTHER

## 2019-05-28 NOTE — HPI
History of present illness- I had the pleasure of meeting this pleasant 38 y.o. lady in the pre op clinic prior to her elective Gynecological surgery. The patient is new to me as a patient .     I have obtained the history by speaking to the patient and by reviewing the electronic health records.    Events leading up to surgery / History of presenting illness -    Pelvic mass    In 2010 , had dermoid cyst in the Rt ovary that was removed .It grew back in 2015 and given maternal aunts endometrial cancer , and her completing her family , choose to have  Hysterectomy, Rt ovary removal .As per her , Left ovary was not locatable at that time    2017 Aug  - started with Rt sided abdominal pain , diagnosed with Rt ovarian cyst , was under monitoring   Had to go to ER  In March 2019 for lower abdomina pain radiating to both loins    Diagnosed with ovarian cysts bilaterally     She has been troubled with moderate- Severe  lower abdominal  pain since March 2019 . Pain increases with intercourse , urination  and decreases with heating pad, resting and pain medication.    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

## 2019-05-28 NOTE — TELEPHONE ENCOUNTER
Spoke with pt. Informed her that Dr German said a bowel prep is not necessary for surgery on Thursday. Pt voiced understanding.   ----- Message from Rupa German MD sent at 5/28/2019  3:16 PM CDT -----  No, thank you.   ----- Message -----  From: Crystal Norwood RN  Sent: 5/28/2019   3:13 PM  To: MD Dr luis Maria,    I was confirming your surgeries for Thursday and Friday. Ivy Salgado was wondering if she needed to do a bowel prep before her surgery on Thursday?    Thanks  Crystal

## 2019-05-28 NOTE — TELEPHONE ENCOUNTER
Called and cancel both prescription that were sent to [facility] pt requested [facility] I will call both of them into [facility].   Klonopin 1 mg tab one by mouth BID as needed for anxiety qty 60 , 0 refills.  Ambien 10 mg tab one by mouth qty 30, 0 refills  Called into [facility] left a message on the pharm voice mail. Patient was notified.

## 2019-05-28 NOTE — ASSESSMENT & PLAN NOTE
Increased since March 2019   Attributes to the abdominal fullness from the pelvic masses    Works as a nurse  Physical activities -does Treadmill 30 minutes 2 days a week   Lives on a second level house and takes stairs multiple times a day  No exertional chest pian, chest tightness, SOB, dizziness , light headiness    No suggestion of symptomatic Coronary artery disease

## 2019-05-28 NOTE — PROGRESS NOTES
J[facility] Roman - Pre Op Consult  Progress Note    Patient Name: Ivy Salgado  MRN: [id number]  Date of Evaluation- 05/29/2019  PCP- Kalia Astorga MD    Future cases for Ivy Salgado [[id number]]     Case ID Status Date Time Sunday Procedure Provider Location    [id number] [facility] 5/30/2019  7:00  SALPINGO-OOPHORECTOMY, BILATERAL Rupa German MD [[id number]] [facility] OR 2ND FLR          HPI:  History of present illness- I had the pleasure of meeting this pleasant 38 y.o. lady in the pre op clinic prior to her elective Gynecological surgery. The patient is new to me as a patient .     I have obtained the history by speaking to the patient and by reviewing the electronic health records.    Events leading up to surgery / History of presenting illness -    Pelvic mass    In 2010 , had dermoid cyst in the Rt ovary that was removed .It grew back in 2015 and given maternal aunts endometrial cancer , and her completing her family , choose to have  Hysterectomy, Rt ovary removal .As per her , Left ovary was not locatable at that time    2017 Aug  - started with Rt sided abdominal pain , diagnosed with Rt ovarian cyst , was under monitoring   Had to go to ER  In March 2019 for lower abdomina pain radiating to both loins    Diagnosed with ovarian cysts bilaterally     She has been troubled with moderate- Severe  lower abdominal  pain since March 2019 . Pain increases with intercourse , urination  and decreases with heating pad, resting and pain medication.    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   Winded on heavier exertion lately ,stilll stays active     Review of Systems   Constitutional: Positive for fever. Negative for chills.        Low grade fever on and off   Feels fine ( Had temperature of 100.7 today )   Down to 99.5   Suggested watching        HENT:        STOPBANG score  0/ 8           Eyes:        No new visual changes   Respiratory:        No cough , phlegm    No Hemoptysis   Cardiovascular:        As noted   Gastrointestinal:        No overt GI/ blood losses  Bowel movements- Regular    Endocrine:        Prednisone use > 20 mg daily for 3 weeks- None  No steroid use for 2 years   Suggested bone evaluation    Genitourinary: Negative for dysuria.   Musculoskeletal:          No unusual, muscle, joint pains   Skin: Positive for rash (around the ostomy bag - seen wound care today ).   Neurological: Negative for syncope.        No unilateral weakness   Hematological:        Current use of Anticoagulants  Current use of Antiplatelet agents  none   Psychiatric/Behavioral:        Supportive boy friend   No SI/HI     No vascular stenting             No anesthesia, bleeding, cardiac problems, PONV with previous surgeries/procedures.  Medications and Allergies reviewed in epic.  Cancer screening suggested  FH- No anesthesia,bleeding / venous thrombosis , early onset heart disease in family       Physical Exam  Blood pressure 124/80, pulse 98, temperature (!) 100.7 °F (38.2 °C),  "height 5' 1" (1.549 m), weight 52.8 kg (116 lb 6.4 oz), last menstrual period 03/30/2015, SpO2 98 %.      Physical Exam  Constitutional- Vitals - Body mass index is 21.99 kg/m².,   Vitals:    05/28/19 1621   BP: 124/80   Pulse: 98   Temp: (!) 100.7 °F (38.2 °C)     General appearance-Conscious,Coherent  Eyes- No conjunctival icterus,pupils  round  and reactive to light   ENT-Oral cavity- moist  , Hearing grossly normal   Neck- No thyromegaly ,Trachea -central, No jugular venous distension,   No Carotid Bruit   Cardiovascular -Heart Sounds- Normal  and  no murmur   , No gallop rhythm   Respiratory - Normal Respiratory Effort, Normal breath sounds,  no wheeze  and  no forced expiratory wheeze    Peripheral pitting pedal edema-- none , no calf pain   Gastrointestinal -Soft abdomen, No palpable masses, Non Tender,Liver,Spleen not palpable. No-- free fluid and shifting dullness  Musculoskeletal- No finger Clubbing. Strength grossly normal   Lymphatic-No Palpable cervical, axillary,Inguinal lymphadenopathy   Psychiatric - normal effect,Orientation  Rt Dorsalis pedis pulses-palpable    Lt Dorsalis pedis pulses- palpable   Rt Posterior tibial pulses -palpable   Left posterior tibial pulses -palpable   Miscellaneous -  no asterixis,  no renal bruit,  bowel sounds positive  and  tattoos  Investigations  Lab and Imaging have been reviewed in [facility].    Review of Medicine tests    EKG- I had independently reviewed the EKG from--4/9/2019   It was reported to be showing     Normal sinus rhythm  Normal ECG    Aug 2018     1 - Normal left ventricular systolic function (EF 60-65%).     2 - Normal right ventricular systolic function .     3 - Normal left ventricular diastolic function.     4 - The estimated PA systolic pressure is 22 mmHg.     Review of clinical lab tests:  Lab Results   Component Value Date    CREATININE 0.8 04/09/2019    HGB 12.5 04/09/2019     04/09/2019         Review of old records- Was done and information " gathered regards to events leading to surgery and health conditions of significance in the perioperative period.        Preoperative cardiac risk assessment-  The patient does not have any active cardiac conditions . Revised cardiac risk index predictors-1 ---.Functional capacity is more than 4 Mets. She will be undergoing a open abdominal procedure that carries a high risk     Risk of a major Cardiac event ( Defined as death, myocardial infarction, or cardiac arrest at 30 days after noncardiac surgery), based on RCRI score     6.0%       No further cardiac work up is indicated prior to proceeding with the surgery     Orders Placed This Encounter    Urine culture    Urinalysis       American Society of Anesthesiologists Physical status classification ( ASA ) class: 2       Assessment/Plan:     Crohn's disease of small intestine with complication  Has Crohn's disease with multiple prior intestinal procedures with resultant total colectomy and end ileostomy in 2012    In 2015 underwent SHELLIE/RSO,   recurring ovarian cysts and pelvic pain  No recent problem wit Crohn's   On Entyvio every 8 weeks- Last dose Early May 2019 -Holding until completed with Gynecological surgery , recovery     Transient neurological symptoms  Pheochromocytoma Was considered , but to her understanding was not diagnosed   Has episodes of flushing , tachycardia , tremors , high BP  Workup for endocrinologic and cardiac etiologies has been unrevealing  Was hospitalized a few times  Was Discharged on Depakote , but not taking it   Unable to see Neurologist due to Medicaid   Suggest Cardiac monitoring chance op       Generalized anxiety disorder  Klonopin as needed ,Remeron   Care with Benzo use discussed  because of opioid use  Suggested follow up       Recurrent UTI  On Keflex for  suppressive treatment   Had Doxycycline early May 2019  subjectively , does not feel like she has UTI     Herpes genitalis in women  On Valtrex   No recent problem      Multiple thyroid nodules  US Oct 2018   Tiny subcentimeter nodule.  No nodule meets criteria for fine-needle aspiration or follow-up  No swallow problem     Vitamin D deficiency  Unable to tolerate Vit D because of diarrhea    Diarrhea  For 2 years  On Imodium , Lomotil  On rotating basis  Stays hydrated   Passes clear urine   Gets IV fluids PRN for dehydration     Appetite impaired  Helped by Marinol   Gained weight since 2018 -   Boost   BMI - N    Chronic, continuous use of opioids  Since Jan 2019   In pain contract   Chronic continuous opioid use- In view of the opioid use, the patient may have opioid tolerance .I suggest considering the possibility of opioid tolerance  in planning post operative pain control     She Is trying to reduce the  tolerance by reducing the opioid use - Down to Half - one Percocet in a day     No suggestion of opioid withdrawal       Acid reflux  Increased ,since March 2019   She attributes this to increased intra abdominal pressure   I suggest continuation of the Zantac in the perioperative period . I suggest aspiration precautions     SOBOE (shortness of breath on exertion)  Increased since March 2019   Attributes to the abdominal fullness from the pelvic masses    Works as a nurse  Physical activities -does Treadmill 30 minutes 2 days a week   Lives on a second level house and takes stairs multiple times a day  No exertional chest pian, chest tightness, SOB, dizziness , light headiness    No suggestion of symptomatic Coronary artery disease      Poor venous access  Has Mykel cath -Left upper chest       Alkaline phosphatase elevation- based on lab from 4/9/2019   Normal bilirubin   Suggested follow up           Preventive perioperative care    Thromboembolic prophylaxis:  Her risk factors for thrombosis include crohn's  surgical procedure.I suggest  thromboembolic prophylaxis ( mechanical/pharmacological, weighing the risk benefits of pharmacological agent use considering chance  procedural bleeding )  during the perioperative period.I suggested being active in the post operative period.      Postoperative pulmonary complication prophylaxis-Risk factors for post operative pulmonary complications include proximity of the surgical site to the lungs- I suggest incentive spirometry use, early ambulation, end tidal carbon dioxide monitoring and pain control so as to avoid diaphragmatic splinting  , oral care , head end of bed elevation      Renal complication prophylaxis-Risk factors for renal complications include, ostomy   . I suggest keeping her well hydrated   Suggested to stay hydrated      Surgical site Infection Prophylaxis-I  suggest appropriate antibiotic for Prophylaxis against Surgical site infections     Delirium prophylaxis-Risk factors - opioid use - I suggest avoidance / minimizing the use of  Benzodiazepines ( unless the patient has been taking it on a regular basis ),Anticholinergic medication,Antihistamines ( like  Benadryl).I suggest minimizing the use of opioid medication and use of IV tylenol,if it is appropriate. I suggest using the lowest possible dose of opioids for the shortest duration possible in the perioperative period. I suggest to Keep shades/blinds open during the day, lights off and shades closed at night to encourage normal sleep/wake cycle.I encourage the presence of the family member with the patient at all times, if at all possible as mental status changes can be picked up early by the family members and they help with reorientation. I encouraged the presence of family to help with orientation in the perioperative period. Benadryl avoidance suggested      In view of gynecological procedure the patient  is at risk of postoperative urinary retention.  I suggest avoidance / minimizing the of  Benzodiazepines,Anticholinergic medication,antihistamines ( Benadryl) , if possible in the perioperative period. I suggest using the minimum possible use of opioids for the  minimum period of time in the perioperative period. Benadryl avoidance suggested      This visit was focused on Preoperative evaluation, Perioperative Medical management, complication reduction plans. I suggest that the patient follows up with primary care or relevant sub specialists for ongoing health care.    I appreciate the opportunity to be involved in this patients care. Please feel free to contact me if there were any questions about this consultation.    Patient is optimized     Patient/ was instructed to call and update me about any changes to health,  medication, office visits ,testing out side of the [facility] care center , hospitalizations between now and surgery     Brittany Samuel MD  Perioperative Medicine  [facility]   Pager [phone]  ----  5/29- 1900    Urinalysis from 5/29/2019 showed - no Urinary tract infection   Called and spoke to her   UA discussed  No urinary burning   No unusual diarrhea , no headache , no cough, no phlegm   No fever   Feels good   Called to follow up , spoke to her  to address any concerns with the up coming surgery or any questions on Medication instructions -  Doing well ,No changes to Medication, Health -  Medication instructions  discussed   ---  5/31- 16 25     Urine culture from 5/29/2019- showed no growth

## 2019-05-28 NOTE — OUTPATIENT SUBJECTIVE & OBJECTIVE
Outpatient Subjective & Objective     Chief complaint-Preoperative evaluation, Perioperative Medical management, complication reduction plan     Active cardiac conditions- none    Revised cardiac risk index predictors- high-risk type of surgery    Functional capacity -Examples of physical activity as noted  house work and can take 1 flight of stairs----- She can undertake all the above activities without  chest pain,chest tightness, Shortness of breath ,dizziness,lightheadedness making her exercise tolerance more, than 4 Mets.   Winded on heavier exertion lately ,stilll stays active     Review of Systems   Constitutional: Positive for fever. Negative for chills.        Low grade fever on and off   Feels fine ( Had temperature of 100.7 today )   Down to 99.5   Suggested watching        HENT:        STOPBANG score  0/ 8           Eyes:        No new visual changes   Respiratory:        No cough , phlegm    No Hemoptysis   Cardiovascular:        As noted   Gastrointestinal:        No overt GI/ blood losses  Bowel movements- Regular    Endocrine:        Prednisone use > 20 mg daily for 3 weeks- None  No steroid use for 2 years   Suggested bone evaluation    Genitourinary: Negative for dysuria.   Musculoskeletal:          No unusual, muscle, joint pains   Skin: Positive for rash (around the ostomy bag - seen wound care today ).   Neurological: Negative for syncope.        No unilateral weakness   Hematological:        Current use of Anticoagulants  Current use of Antiplatelet agents  none   Psychiatric/Behavioral:        Supportive boy friend   No SI/HI     No vascular stenting             No anesthesia, bleeding, cardiac problems, PONV with previous surgeries/procedures.  Medications and Allergies reviewed in epic.  Cancer screening suggested  FH- No anesthesia,bleeding / venous thrombosis , early onset heart disease in family       Physical Exam  Blood pressure 124/80, pulse 98, temperature (!) 100.7 °F (38.2 °C),  "height 5' 1" (1.549 m), weight 52.8 kg (116 lb 6.4 oz), last menstrual period 03/30/2015, SpO2 98 %.      Physical Exam  Constitutional- Vitals - Body mass index is 21.99 kg/m².,   Vitals:    05/28/19 1621   BP: 124/80   Pulse: 98   Temp: (!) 100.7 °F (38.2 °C)     General appearance-Conscious,Coherent  Eyes- No conjunctival icterus,pupils  round  and reactive to light   ENT-Oral cavity- moist  , Hearing grossly normal   Neck- No thyromegaly ,Trachea -central, No jugular venous distension,   No Carotid Bruit   Cardiovascular -Heart Sounds- Normal  and  no murmur   , No gallop rhythm   Respiratory - Normal Respiratory Effort, Normal breath sounds,  no wheeze  and  no forced expiratory wheeze    Peripheral pitting pedal edema-- none , no calf pain   Gastrointestinal -Soft abdomen, No palpable masses, Non Tender,Liver,Spleen not palpable. No-- free fluid and shifting dullness  Musculoskeletal- No finger Clubbing. Strength grossly normal   Lymphatic-No Palpable cervical, axillary,Inguinal lymphadenopathy   Psychiatric - normal effect,Orientation  Rt Dorsalis pedis pulses-palpable    Lt Dorsalis pedis pulses- palpable   Rt Posterior tibial pulses -palpable   Left posterior tibial pulses -palpable   Miscellaneous -  no asterixis,  no renal bruit,  bowel sounds positive  and  tattoos  Investigations  Lab and Imaging have been reviewed in [facility].    Review of Medicine tests    EKG- I had independently reviewed the EKG from--4/9/2019   It was reported to be showing     Normal sinus rhythm  Normal ECG    Aug 2018     1 - Normal left ventricular systolic function (EF 60-65%).     2 - Normal right ventricular systolic function .     3 - Normal left ventricular diastolic function.     4 - The estimated PA systolic pressure is 22 mmHg.     Review of clinical lab tests:  Lab Results   Component Value Date    CREATININE 0.8 04/09/2019    HGB 12.5 04/09/2019     04/09/2019         Review of old records- Was done and information " gathered regards to events leading to surgery and health conditions of significance in the perioperative period.    Outpatient Subjective & Objective

## 2019-05-28 NOTE — ASSESSMENT & PLAN NOTE
Klonopin as needed ,Remeron   Care with Benzo use discussed  because of opioid use  Suggested follow up

## 2019-05-28 NOTE — ASSESSMENT & PLAN NOTE
US Oct 2018   Tiny subcentimeter nodule.  No nodule meets criteria for fine-needle aspiration or follow-up  No swallow problem

## 2019-05-28 NOTE — ASSESSMENT & PLAN NOTE
Pheochromocytoma Was considered , but to her understanding was not diagnosed   Has episodes of flushing , tachycardia , tremors , high BP  Workup for endocrinologic and cardiac etiologies has been unrevealing  Was hospitalized a few times  Was Discharged on Depakote , but not taking it   Unable to see Neurologist due to Medicaid   Suggest Cardiac monitoring chance op

## 2019-05-28 NOTE — TELEPHONE ENCOUNTER
Spoke with pt. Confirmed pt surgery date on Thursday May 30, 2019. Informed pt she should arrive at 0530 to the second floor of the day of surgery center. Reminded pt nothing to eat or drink after midnight on Wednesday. Pt voiced understanding. Pt asked if she needed to do a bowel prep. Informed pt that message would be sent to Dr German and I will let her know once I hear back. Voiced understanding.

## 2019-05-28 NOTE — ASSESSMENT & PLAN NOTE
Since Jan 2019   In pain contract   Chronic continuous opioid use- In view of the opioid use, the patient may have opioid tolerance .I suggest considering the possibility of opioid tolerance  in planning post operative pain control     She Is trying to reduce the  tolerance by reducing the opioid use - Down to Half - one Percocet in a day     No suggestion of opioid withdrawal

## 2019-05-28 NOTE — ASSESSMENT & PLAN NOTE
For 2 years  On Imodium , Lomotil  On rotating basis  Stays hydrated   Passes clear urine   Gets IV fluids PRN for dehydration

## 2019-05-28 NOTE — ASSESSMENT & PLAN NOTE
On Keflex for  suppressive treatment   Had Doxycycline early May 2019  subjectively , does not feel like she has UTI

## 2019-05-28 NOTE — ASSESSMENT & PLAN NOTE
Has Crohn's disease with multiple prior intestinal procedures with resultant total colectomy and end ileostomy in 2012    In 2015 underwent SHELLIE/RSO,   recurring ovarian cysts and pelvic pain  No recent problem wit Crohn's   On Entyvio every 8 weeks- Last dose Early May 2019 -Holding until completed with Gynecological surgery , recovery

## 2019-05-28 NOTE — ASSESSMENT & PLAN NOTE
Increased ,since March 2019   She attributes this to increased intra abdominal pressure   I suggest continuation of the Zantac in the perioperative period . I suggest aspiration precautions

## 2019-05-29 RX ORDER — CLONAZEPAM 1 MG/1
TABLET ORAL
Qty: 60 TABLET | Refills: 0 | Status: SHIPPED | OUTPATIENT
Start: 2019-05-29 | End: 2019-06-11 | Stop reason: SDUPTHER

## 2019-05-29 RX ORDER — ZOLPIDEM TARTRATE 10 MG/1
TABLET ORAL
Qty: 30 TABLET | Refills: 0 | Status: SHIPPED | OUTPATIENT
Start: 2019-05-29 | End: 2019-06-25 | Stop reason: SDUPTHER

## 2019-05-30 ENCOUNTER — HOSPITAL ENCOUNTER (OUTPATIENT)
Facility: HOSPITAL | Age: 39
Discharge: HOME | DRG: 742 | End: 2019-06-07
Attending: OBSTETRICS & GYNECOLOGY | Admitting: OBSTETRICS & GYNECOLOGY
Payer: MEDICAID

## 2019-05-30 ENCOUNTER — ANESTHESIA (OUTPATIENT)
Dept: SURGERY | Facility: HOSPITAL | Age: 39
DRG: 742 | End: 2019-05-30
Payer: MEDICAID

## 2019-05-30 DIAGNOSIS — Z93.2 S/P ILEOSTOMY: Chronic | ICD-10-CM

## 2019-05-30 DIAGNOSIS — K50.019 CROHN'S DISEASE OF SMALL INTESTINE WITH COMPLICATION: ICD-10-CM

## 2019-05-30 DIAGNOSIS — R19.00 PELVIC MASS: ICD-10-CM

## 2019-05-30 DIAGNOSIS — R00.0 TACHYCARDIA: ICD-10-CM

## 2019-05-30 DIAGNOSIS — Z90.722 S/P BSO (BILATERAL SALPINGO-OOPHORECTOMY): Primary | ICD-10-CM

## 2019-05-30 PROCEDURE — C1765 ADHESION BARRIER: HCPCS | Performed by: OBSTETRICS & GYNECOLOGY

## 2019-05-30 PROCEDURE — 71000039 HC RECOVERY, EACH ADD'L HOUR: Performed by: OBSTETRICS & GYNECOLOGY

## 2019-05-30 PROCEDURE — 37000008 HC ANESTHESIA 1ST 15 MINUTES: Performed by: OBSTETRICS & GYNECOLOGY

## 2019-05-30 PROCEDURE — 25000003 PHARM REV CODE 250: Performed by: STUDENT IN AN ORGANIZED HEALTH CARE EDUCATION/TRAINING PROGRAM

## 2019-05-30 PROCEDURE — G0379 DIRECT REFER HOSPITAL OBSERV: HCPCS

## 2019-05-30 PROCEDURE — 25000003 PHARM REV CODE 250: Performed by: ANESTHESIOLOGY

## 2019-05-30 PROCEDURE — 63600175 PHARM REV CODE 636 W HCPCS: Performed by: STUDENT IN AN ORGANIZED HEALTH CARE EDUCATION/TRAINING PROGRAM

## 2019-05-30 PROCEDURE — 00840 ANES IPER PX LOWER ABD NOS: CPT | Performed by: OBSTETRICS & GYNECOLOGY

## 2019-05-30 PROCEDURE — D9220A PRA ANESTHESIA: ICD-10-PCS | Mod: ,,, | Performed by: ANESTHESIOLOGY

## 2019-05-30 PROCEDURE — 88307 TISSUE SPECIMEN TO PATHOLOGY - SURGERY: ICD-10-PCS | Mod: 26,,, | Performed by: PATHOLOGY

## 2019-05-30 PROCEDURE — 88307 TISSUE EXAM BY PATHOLOGIST: CPT | Mod: 26,,, | Performed by: PATHOLOGY

## 2019-05-30 PROCEDURE — 49204 PR EXCISION/DESTRUCTION OPEN ABDOMINAL TUMORS 5.1-10.0 CM: ICD-10-PCS | Mod: 22,,, | Performed by: OBSTETRICS & GYNECOLOGY

## 2019-05-30 PROCEDURE — 49204 PR EXCISION/DESTRUCTION OPEN ABDOMINAL TUMORS 5.1-10.0 CM: CPT | Mod: 22,,, | Performed by: OBSTETRICS & GYNECOLOGY

## 2019-05-30 PROCEDURE — 71000033 HC RECOVERY, INTIAL HOUR: Performed by: OBSTETRICS & GYNECOLOGY

## 2019-05-30 PROCEDURE — C9290 INJ, BUPIVACAINE LIPOSOME: HCPCS | Performed by: OBSTETRICS & GYNECOLOGY

## 2019-05-30 PROCEDURE — 36000709 HC OR TIME LEV III EA ADD 15 MIN: Performed by: OBSTETRICS & GYNECOLOGY

## 2019-05-30 PROCEDURE — 37000009 HC ANESTHESIA EA ADD 15 MINS: Performed by: OBSTETRICS & GYNECOLOGY

## 2019-05-30 PROCEDURE — 63600175 PHARM REV CODE 636 W HCPCS

## 2019-05-30 PROCEDURE — 25000003 PHARM REV CODE 250: Performed by: OBSTETRICS & GYNECOLOGY

## 2019-05-30 PROCEDURE — G0378 HOSPITAL OBSERVATION PER HR: HCPCS

## 2019-05-30 PROCEDURE — 88307 TISSUE EXAM BY PATHOLOGIST: CPT | Performed by: PATHOLOGY

## 2019-05-30 PROCEDURE — 63600175 PHARM REV CODE 636 W HCPCS: Performed by: ANESTHESIOLOGY

## 2019-05-30 PROCEDURE — 36000708 HC OR TIME LEV III 1ST 15 MIN: Performed by: OBSTETRICS & GYNECOLOGY

## 2019-05-30 PROCEDURE — 94799 UNLISTED PULMONARY SVC/PX: CPT

## 2019-05-30 PROCEDURE — 63600175 PHARM REV CODE 636 W HCPCS: Performed by: OBSTETRICS & GYNECOLOGY

## 2019-05-30 PROCEDURE — 20600001 HC STEP DOWN PRIVATE ROOM

## 2019-05-30 PROCEDURE — 27201423 OPTIME MED/SURG SUP & DEVICES STERILE SUPPLY: Performed by: OBSTETRICS & GYNECOLOGY

## 2019-05-30 PROCEDURE — D9220A PRA ANESTHESIA: Mod: ,,, | Performed by: ANESTHESIOLOGY

## 2019-05-30 DEVICE — MEMBRANE SEPRAFILM 5 X 6: Type: IMPLANTABLE DEVICE | Site: ABDOMEN | Status: FUNCTIONAL

## 2019-05-30 RX ORDER — OXYCODONE AND ACETAMINOPHEN 5; 325 MG/1; MG/1
1 TABLET ORAL EVERY 4 HOURS PRN
Status: DISCONTINUED | OUTPATIENT
Start: 2019-05-30 | End: 2019-05-30

## 2019-05-30 RX ORDER — ACETAMINOPHEN 10 MG/ML
INJECTION, SOLUTION INTRAVENOUS
Status: DISCONTINUED | OUTPATIENT
Start: 2019-05-30 | End: 2019-05-30

## 2019-05-30 RX ORDER — FENTANYL CITRATE 50 UG/ML
25 INJECTION, SOLUTION INTRAMUSCULAR; INTRAVENOUS EVERY 5 MIN PRN
Status: COMPLETED | OUTPATIENT
Start: 2019-05-30 | End: 2019-05-30

## 2019-05-30 RX ORDER — HYDROMORPHONE HCL IN 0.9% NACL 6 MG/30 ML
PATIENT CONTROLLED ANALGESIA SYRINGE INTRAVENOUS CONTINUOUS
Status: DISPENSED | OUTPATIENT
Start: 2019-05-30 | End: 2019-06-01

## 2019-05-30 RX ORDER — LIDOCAINE HYDROCHLORIDE 10 MG/ML
1 INJECTION, SOLUTION EPIDURAL; INFILTRATION; INTRACAUDAL; PERINEURAL ONCE
Status: DISCONTINUED | OUTPATIENT
Start: 2019-05-30 | End: 2019-05-30

## 2019-05-30 RX ORDER — OXYCODONE AND ACETAMINOPHEN 10; 325 MG/1; MG/1
1 TABLET ORAL EVERY 4 HOURS PRN
Status: DISCONTINUED | OUTPATIENT
Start: 2019-05-30 | End: 2019-05-30

## 2019-05-30 RX ORDER — KETAMINE HYDROCHLORIDE 100 MG/ML
INJECTION, SOLUTION INTRAMUSCULAR; INTRAVENOUS
Status: DISCONTINUED | OUTPATIENT
Start: 2019-05-30 | End: 2019-05-30

## 2019-05-30 RX ORDER — LIDOCAINE HCL/PF 100 MG/5ML
SYRINGE (ML) INTRAVENOUS
Status: DISCONTINUED | OUTPATIENT
Start: 2019-05-30 | End: 2019-05-30

## 2019-05-30 RX ORDER — MUPIROCIN 20 MG/G
1 OINTMENT TOPICAL 2 TIMES DAILY
Status: DISPENSED | OUTPATIENT
Start: 2019-05-30 | End: 2019-06-04

## 2019-05-30 RX ORDER — PROPOFOL 10 MG/ML
VIAL (ML) INTRAVENOUS
Status: DISCONTINUED | OUTPATIENT
Start: 2019-05-30 | End: 2019-05-30

## 2019-05-30 RX ORDER — NALOXONE HCL 0.4 MG/ML
0.02 VIAL (ML) INJECTION
Status: DISCONTINUED | OUTPATIENT
Start: 2019-05-30 | End: 2019-06-04

## 2019-05-30 RX ORDER — MIDAZOLAM HYDROCHLORIDE 1 MG/ML
INJECTION, SOLUTION INTRAMUSCULAR; INTRAVENOUS
Status: DISCONTINUED | OUTPATIENT
Start: 2019-05-30 | End: 2019-05-30

## 2019-05-30 RX ORDER — SODIUM CHLORIDE 0.9 % (FLUSH) 0.9 %
10 SYRINGE (ML) INJECTION
Status: DISCONTINUED | OUTPATIENT
Start: 2019-05-30 | End: 2019-05-30

## 2019-05-30 RX ORDER — NEOSTIGMINE METHYLSULFATE 1 MG/ML
INJECTION, SOLUTION INTRAVENOUS
Status: DISCONTINUED | OUTPATIENT
Start: 2019-05-30 | End: 2019-05-30

## 2019-05-30 RX ORDER — FENTANYL CITRATE 50 UG/ML
INJECTION, SOLUTION INTRAMUSCULAR; INTRAVENOUS
Status: DISCONTINUED | OUTPATIENT
Start: 2019-05-30 | End: 2019-05-30

## 2019-05-30 RX ORDER — HYDROMORPHONE HYDROCHLORIDE 1 MG/ML
INJECTION, SOLUTION INTRAMUSCULAR; INTRAVENOUS; SUBCUTANEOUS
Status: COMPLETED
Start: 2019-05-30 | End: 2019-05-30

## 2019-05-30 RX ORDER — ROCURONIUM BROMIDE 10 MG/ML
INJECTION, SOLUTION INTRAVENOUS
Status: DISCONTINUED | OUTPATIENT
Start: 2019-05-30 | End: 2019-05-30

## 2019-05-30 RX ORDER — HYDROMORPHONE HYDROCHLORIDE 1 MG/ML
1 INJECTION, SOLUTION INTRAMUSCULAR; INTRAVENOUS; SUBCUTANEOUS EVERY 4 HOURS PRN
Status: DISCONTINUED | OUTPATIENT
Start: 2019-05-30 | End: 2019-05-30

## 2019-05-30 RX ORDER — LORAZEPAM 2 MG/ML
0.5 INJECTION INTRAMUSCULAR ONCE AS NEEDED
Status: COMPLETED | OUTPATIENT
Start: 2019-05-30 | End: 2019-05-30

## 2019-05-30 RX ORDER — CLONAZEPAM 0.5 MG/1
1 TABLET ORAL 2 TIMES DAILY PRN
Status: DISCONTINUED | OUTPATIENT
Start: 2019-05-30 | End: 2019-06-07 | Stop reason: HOSPADM

## 2019-05-30 RX ORDER — DIPHENHYDRAMINE HCL 25 MG
25 CAPSULE ORAL EVERY 4 HOURS PRN
Status: DISCONTINUED | OUTPATIENT
Start: 2019-05-30 | End: 2019-06-07 | Stop reason: HOSPADM

## 2019-05-30 RX ORDER — SODIUM CHLORIDE 9 MG/ML
INJECTION, SOLUTION INTRAVENOUS CONTINUOUS
Status: DISCONTINUED | OUTPATIENT
Start: 2019-05-30 | End: 2019-05-30

## 2019-05-30 RX ORDER — ACETAMINOPHEN 500 MG
1000 TABLET ORAL EVERY 8 HOURS
Status: DISCONTINUED | OUTPATIENT
Start: 2019-05-30 | End: 2019-05-31

## 2019-05-30 RX ORDER — CEFAZOLIN SODIUM 1 G/3ML
2 INJECTION, POWDER, FOR SOLUTION INTRAMUSCULAR; INTRAVENOUS
Status: COMPLETED | OUTPATIENT
Start: 2019-05-30 | End: 2019-05-30

## 2019-05-30 RX ORDER — HYDROMORPHONE HYDROCHLORIDE 1 MG/ML
0.2 INJECTION, SOLUTION INTRAMUSCULAR; INTRAVENOUS; SUBCUTANEOUS EVERY 5 MIN PRN
Status: COMPLETED | OUTPATIENT
Start: 2019-05-30 | End: 2019-05-30

## 2019-05-30 RX ORDER — ONDANSETRON 8 MG/1
8 TABLET, ORALLY DISINTEGRATING ORAL EVERY 8 HOURS PRN
Status: DISCONTINUED | OUTPATIENT
Start: 2019-05-30 | End: 2019-06-07 | Stop reason: HOSPADM

## 2019-05-30 RX ORDER — FAMOTIDINE 20 MG/1
20 TABLET, FILM COATED ORAL 2 TIMES DAILY
Status: DISCONTINUED | OUTPATIENT
Start: 2019-05-30 | End: 2019-06-07 | Stop reason: HOSPADM

## 2019-05-30 RX ORDER — SODIUM CHLORIDE, SODIUM LACTATE, POTASSIUM CHLORIDE, CALCIUM CHLORIDE 600; 310; 30; 20 MG/100ML; MG/100ML; MG/100ML; MG/100ML
INJECTION, SOLUTION INTRAVENOUS CONTINUOUS
Status: ACTIVE | OUTPATIENT
Start: 2019-05-30 | End: 2019-06-01

## 2019-05-30 RX ORDER — GLYCOPYRROLATE 0.2 MG/ML
INJECTION INTRAMUSCULAR; INTRAVENOUS
Status: DISCONTINUED | OUTPATIENT
Start: 2019-05-30 | End: 2019-05-30

## 2019-05-30 RX ORDER — BUPIVACAINE HYDROCHLORIDE 2.5 MG/ML
INJECTION, SOLUTION EPIDURAL; INFILTRATION; INTRACAUDAL
Status: DISCONTINUED | OUTPATIENT
Start: 2019-05-30 | End: 2019-05-30

## 2019-05-30 RX ORDER — BISACODYL 10 MG
10 SUPPOSITORY, RECTAL RECTAL DAILY PRN
Status: DISCONTINUED | OUTPATIENT
Start: 2019-05-30 | End: 2019-05-31

## 2019-05-30 RX ORDER — DEXAMETHASONE SODIUM PHOSPHATE 4 MG/ML
INJECTION, SOLUTION INTRA-ARTICULAR; INTRALESIONAL; INTRAMUSCULAR; INTRAVENOUS; SOFT TISSUE
Status: DISCONTINUED | OUTPATIENT
Start: 2019-05-30 | End: 2019-05-30

## 2019-05-30 RX ORDER — DIPHENHYDRAMINE HYDROCHLORIDE 50 MG/ML
25 INJECTION INTRAMUSCULAR; INTRAVENOUS EVERY 4 HOURS PRN
Status: DISCONTINUED | OUTPATIENT
Start: 2019-05-30 | End: 2019-06-07 | Stop reason: HOSPADM

## 2019-05-30 RX ORDER — LORAZEPAM 2 MG/ML
0.5 INJECTION INTRAMUSCULAR ONCE
Status: COMPLETED | OUTPATIENT
Start: 2019-05-30 | End: 2019-05-30

## 2019-05-30 RX ORDER — IBUPROFEN 600 MG/1
600 TABLET ORAL EVERY 6 HOURS
Status: DISCONTINUED | OUTPATIENT
Start: 2019-05-31 | End: 2019-05-30

## 2019-05-30 RX ORDER — MIRTAZAPINE 15 MG/1
30 TABLET, ORALLY DISINTEGRATING ORAL NIGHTLY
Status: DISCONTINUED | OUTPATIENT
Start: 2019-05-30 | End: 2019-06-07 | Stop reason: HOSPADM

## 2019-05-30 RX ADMIN — Medication: at 04:05

## 2019-05-30 RX ADMIN — HYDROMORPHONE HYDROCHLORIDE 0.2 MG: 1 INJECTION, SOLUTION INTRAMUSCULAR; INTRAVENOUS; SUBCUTANEOUS at 11:05

## 2019-05-30 RX ADMIN — FENTANYL CITRATE 25 MCG: 50 INJECTION INTRAMUSCULAR; INTRAVENOUS at 11:05

## 2019-05-30 RX ADMIN — ROCURONIUM BROMIDE 30 MG: 10 INJECTION, SOLUTION INTRAVENOUS at 07:05

## 2019-05-30 RX ADMIN — CLONAZEPAM 1 MG: 1 TABLET ORAL at 10:05

## 2019-05-30 RX ADMIN — KETAMINE HYDROCHLORIDE 10 MG: 100 INJECTION, SOLUTION, CONCENTRATE INTRAMUSCULAR; INTRAVENOUS at 09:05

## 2019-05-30 RX ADMIN — MIDAZOLAM HYDROCHLORIDE 2 MG: 1 INJECTION, SOLUTION INTRAMUSCULAR; INTRAVENOUS at 06:05

## 2019-05-30 RX ADMIN — LORAZEPAM 0.5 MG: 2 INJECTION, SOLUTION INTRAMUSCULAR; INTRAVENOUS at 11:05

## 2019-05-30 RX ADMIN — KETAMINE HYDROCHLORIDE 10 MG: 10 INJECTION, SOLUTION INTRAMUSCULAR; INTRAVENOUS at 10:05

## 2019-05-30 RX ADMIN — MUPIROCIN 1 G: 20 OINTMENT TOPICAL at 09:05

## 2019-05-30 RX ADMIN — FENTANYL CITRATE 25 MCG: 50 INJECTION, SOLUTION INTRAMUSCULAR; INTRAVENOUS at 09:05

## 2019-05-30 RX ADMIN — ACETAMINOPHEN 1000 MG: 10 INJECTION, SOLUTION INTRAVENOUS at 09:05

## 2019-05-30 RX ADMIN — SODIUM CHLORIDE: 0.9 INJECTION, SOLUTION INTRAVENOUS at 06:05

## 2019-05-30 RX ADMIN — FENTANYL CITRATE 25 MCG: 50 INJECTION INTRAMUSCULAR; INTRAVENOUS at 10:05

## 2019-05-30 RX ADMIN — PROMETHAZINE HYDROCHLORIDE 6.25 MG: 25 INJECTION INTRAMUSCULAR; INTRAVENOUS at 11:05

## 2019-05-30 RX ADMIN — PROPOFOL 150 MG: 10 INJECTION, EMULSION INTRAVENOUS at 07:05

## 2019-05-30 RX ADMIN — ROCURONIUM BROMIDE 20 MG: 10 INJECTION, SOLUTION INTRAVENOUS at 07:05

## 2019-05-30 RX ADMIN — ROCURONIUM BROMIDE 10 MG: 10 INJECTION, SOLUTION INTRAVENOUS at 09:05

## 2019-05-30 RX ADMIN — FENTANYL CITRATE 50 MCG: 50 INJECTION, SOLUTION INTRAMUSCULAR; INTRAVENOUS at 07:05

## 2019-05-30 RX ADMIN — Medication: at 11:05

## 2019-05-30 RX ADMIN — GLYCOPYRROLATE 0.6 MG: 0.2 INJECTION, SOLUTION INTRAMUSCULAR; INTRAVENOUS at 09:05

## 2019-05-30 RX ADMIN — SODIUM CHLORIDE, SODIUM GLUCONATE, SODIUM ACETATE, POTASSIUM CHLORIDE, MAGNESIUM CHLORIDE, SODIUM PHOSPHATE, DIBASIC, AND POTASSIUM PHOSPHATE: .53; .5; .37; .037; .03; .012; .00082 INJECTION, SOLUTION INTRAVENOUS at 07:05

## 2019-05-30 RX ADMIN — FENTANYL CITRATE 100 MCG: 50 INJECTION, SOLUTION INTRAMUSCULAR; INTRAVENOUS at 07:05

## 2019-05-30 RX ADMIN — HYDROMORPHONE HYDROCHLORIDE 1 MG: 1 INJECTION, SOLUTION INTRAMUSCULAR; INTRAVENOUS; SUBCUTANEOUS at 02:05

## 2019-05-30 RX ADMIN — FAMOTIDINE 20 MG: 20 TABLET, FILM COATED ORAL at 08:05

## 2019-05-30 RX ADMIN — ROCURONIUM BROMIDE 20 MG: 10 INJECTION, SOLUTION INTRAVENOUS at 08:05

## 2019-05-30 RX ADMIN — KETAMINE HYDROCHLORIDE 20 MG: 100 INJECTION, SOLUTION, CONCENTRATE INTRAMUSCULAR; INTRAVENOUS at 07:05

## 2019-05-30 RX ADMIN — ACETAMINOPHEN 1000 MG: 500 TABLET ORAL at 04:05

## 2019-05-30 RX ADMIN — CEFAZOLIN 2 G: 330 INJECTION, POWDER, FOR SOLUTION INTRAMUSCULAR; INTRAVENOUS at 07:05

## 2019-05-30 RX ADMIN — OXYCODONE AND ACETAMINOPHEN 1 TABLET: 10; 325 TABLET ORAL at 11:05

## 2019-05-30 RX ADMIN — NEOSTIGMINE METHYLSULFATE 5 MG: 1 INJECTION INTRAVENOUS at 09:05

## 2019-05-30 RX ADMIN — MIRTAZAPINE 30 MG: 15 TABLET, ORALLY DISINTEGRATING ORAL at 08:05

## 2019-05-30 RX ADMIN — SODIUM CHLORIDE, SODIUM LACTATE, POTASSIUM CHLORIDE, AND CALCIUM CHLORIDE: .6; .31; .03; .02 INJECTION, SOLUTION INTRAVENOUS at 08:05

## 2019-05-30 RX ADMIN — LIDOCAINE HYDROCHLORIDE 65 MG: 20 INJECTION, SOLUTION INTRAVENOUS at 07:05

## 2019-05-30 RX ADMIN — KETAMINE HYDROCHLORIDE 10 MG: 100 INJECTION, SOLUTION, CONCENTRATE INTRAMUSCULAR; INTRAVENOUS at 08:05

## 2019-05-30 RX ADMIN — SODIUM CHLORIDE, SODIUM LACTATE, POTASSIUM CHLORIDE, AND CALCIUM CHLORIDE: .6; .31; .03; .02 INJECTION, SOLUTION INTRAVENOUS at 10:05

## 2019-05-30 RX ADMIN — DEXAMETHASONE SODIUM PHOSPHATE 4 MG: 4 INJECTION, SOLUTION INTRAMUSCULAR; INTRAVENOUS at 07:05

## 2019-05-30 RX ADMIN — KETAMINE HYDROCHLORIDE 10 MG: 100 INJECTION, SOLUTION, CONCENTRATE INTRAMUSCULAR; INTRAVENOUS at 07:05

## 2019-05-30 RX ADMIN — FENTANYL CITRATE 25 MCG: 50 INJECTION, SOLUTION INTRAMUSCULAR; INTRAVENOUS at 10:05

## 2019-05-30 RX ADMIN — IBUPROFEN 800 MG: 800 INJECTION INTRAVENOUS at 10:05

## 2019-05-30 RX ADMIN — FENTANYL CITRATE 25 MCG: 50 INJECTION, SOLUTION INTRAMUSCULAR; INTRAVENOUS at 08:05

## 2019-05-30 RX ADMIN — ACETAMINOPHEN 1000 MG: 500 TABLET ORAL at 10:05

## 2019-05-30 NOTE — PROGRESS NOTES
Pt still complaining of severe pain despite pain pill, IV fentanyl and dilaudid and IV ativan. Pt states pain got better for a second but came back up. Dr. Ross notified, came to bedside and gave pt dose of ketamine. Will continue to monitor

## 2019-05-30 NOTE — PLAN OF CARE
Problem: Adult Inpatient Plan of Care  Goal: Plan of Care Review  Outcome: Ongoing (interventions implemented as appropriate)  POC reviewed with patient; understanding verbalized. Pt AAOx4. S/P Ex Lap and BSO. C/O of pain not relived by PRNs; Dilaudid PCA and Tylenol ordered. LR @ 125. Ureña draining clear yellow urine. Ileostomy with minimal output. SCDs in place. Pt. with nonskid footwear on, bed in lowest position, and locked with bed rails up x2.  Pt. instructed to call prior to getting OOB.  Pt. has call light and personal items within reach. Patient ambulates in room independently. VSS and afebrile this shift. All questions and concerns addressed at this time. Spouse at bedside. Will continue to monitor.

## 2019-05-30 NOTE — NURSING TRANSFER
Nursing Transfer Note      5/30/2019     Transfer To: 859    Transfer via stretcher    Transfer with IV pole    Transported by PCT    Medicines sent: none    Chart send with patient: Yes    Notified: mom, significant other    Patient reassessed at: 1336

## 2019-05-30 NOTE — INTERVAL H&P NOTE
The patient has been examined and the H&P has been reviewed:    I concur with the findings and no changes have occurred since H&P was written.    Surgery risks, benefits and alternative options discussed and understood by patient/family.    Kalie Silva MD  Ob/Gyn PGY-3          Active Hospital Problems    Diagnosis  POA    Pelvic mass [R19.00]  Yes      Resolved Hospital Problems   No resolved problems to display.

## 2019-05-30 NOTE — OP NOTE
DATE OF PROCEDURE:  5/30/19     SURGEON:  Rupa German M.D.     ASSISTANTS: Bob Parsons MD- no qualified resident was available for his portion of the procedure. Kalie Silva MD (RES) and Beverly Fishman MD (RES)      PREOPERATIVE DIAGNOSES:  1. Crohn's disease with prior colectomy  2. Multiple prior abdominal surgeries  3. Pelvic adhesive disease  4. Bilateral ovarian cysts and pelvic pain  5. Ileostomy  6. Prior hysterectomy     POSTOPERATIVE DIAGNOSES:  1. Crohn's disease with prior colectomy  2. Multiple prior abdominal surgeries  3. Pelvic adhesive disease  4. Bilateral ovarian cysts and pelvic pain  5. Ileostomy  6. Prior hysterectomy     PROCEDURES PERFORMED: Exploratory laparotomy, lysis of adhesions, bilateral salpingo-oophorectomy/mass resection     ANESTHESIA:  General endotracheal anesthesia.     SPECIMENS REMOVED:  1.  Bilateral fallopian tubes and ovaries/pelvic mass     ESTIMATED BLOOD LOSS:  100 mL.     COMPLICATIONS:  None.     FINDINGS: Small bowel pelvic adhesive disease requiring lysis of adhesions. 5cm right adnexal cyst involving the right fallopian tube and ovary adherent to the external iliac artery and vein. 7cm left pelvic cyst involving the left fallopian tube and ovary adherent to the posterior cul de sac/vaginal cuff and left pelvic sidewall. No ascites, no obvious evidence of intraperitoneal disease. Complete resection of pelvic masses and bilateral ovaries was performed.         PROCEDURE IN DETAIL:  The patient was taken to the Operating Room.  Informed consent had been obtained.  She underwent general endotracheal anesthesia  without difficulty and was prepped and draped in the normal sterile fashion in the dorsal lithotomy position.  Timeout was performed.  All parties agreed to the planned procedure. Perioperative antibiotics were administered.  Ureña catheter was placed under sterile conditions. Ileostomy was protected in a sterile fashion.  Midline vertical skin incision  was made with a scalpel and carried down to the underlying layer of fascia. The fascia was incised in the midline and extended superiorly and inferiorly.  The rectus muscles were divided in the midline.  The peritoneum was identified, tented up with two tonsil clamps, and entered sharply with Metzenbaum scissors. The peritoneal incision was extended superiorly and inferiorly.      We began the procedure with extensive lysis of adhesion of the small bowel from the pelvis to expose the adnexal cyst and pelvic anatomy. This was accomplished with the assistance of Dr. Parsons and the CRS team using sharp and blunt dissection. Once this was accomplished bookwalter retractor was assembled and bowel was packed away with moist laparotomy sponges. Pelvic findings as noted above. We accessed the retroperitoneum on the right and skeletonized the right IP ligament with good visualization of the right ureter beneath. The right IP ligament was then doubly clamped, transected, and doubly suture ligated with good visualization of the ureter beneath. The right ovary/pelvic mass was denseley adherent to the external iliac vessels requiring meticulous sharp dissection. Once the lesion was released from the vesseles the remainder of the peritoneal attachments of the right ovary/mass were released and the lesion was entirely excised and handed off to pathology.      We then proceeded in a similar fashion on the left. There was a ballotable left pelvic cyst deep in the cul de sac as noted above.We accessed the retroperitoneum on the left and skeletonized the left IP ligament with good visualization of the left ureter beneath. The left IP ligament was then doubly clamped, transected, and doubly suture ligated with good visualization of the ureter beneath. The remainder of the peritoneal attachments of the left ovary/cystic lesion were released and the entire lesion was excised and handed off to pathology.      The abdomen and pelvis were  copiously irrigated, cleared of all clot and debris, and rendered hemostatic. Ureters were reinspected and both noted to be vermiculating equally and briskly. At this point the procedure was deemed complete.       Laparotomy sponges were removed from the abdomen.  Bookwalter retractor was taken down.  Exparel was placed superiorly and inferiorly to the fascia.  The fascia was reapproximated with a #1 looped PDS in a running fashion.  Subcutaneous tissue was irrigated, cleared of all clot and debris, and rendered hemostatic. Skin incision was closed with 4-0 Monocryl in a subcuticular fashion and topped with sterile dressing. The patient tolerated the procedure well.  Sponge, lap, needle, and instrument counts were correct x2 as reported by the circulating nurse. She was awakened from anesthesia and taken to recovery in stable condition.

## 2019-05-30 NOTE — BRIEF OP NOTE
[facility]  Brief Operative Note    SUMMARY     Surgery Date: 5/30/2019     Surgeon(s) and Role:     * Rupa German MD - Primary     * Bob Parsons MD- Assisting     * Kalie Silva MD - Resident - Assisting     * Beverly Fishman MD- Resident - Assisting        Pre-op Diagnosis:  Pelvic mass [R19.00]    Post-op Diagnosis:  Post-Op Diagnosis Codes:     * Pelvic mass [R19.00]    Procedure(s) (LRB):  SALPINGO-OOPHORECTOMY, BILATERAL (Bilateral)  LAPAROTOMY, EXPLORATORY (N/A)  LYSIS, ADHESIONS (N/A)    Anesthesia: General    Description of Procedure:   - Ileostomy in place  - Abdominal entry achieved with Dr. Parsons's assistance with lysis of adhesions  - 5 cm right adnexal mass of the right ovary, adherent to R. External Iliac artery and vein  - Right adnexal mass dissected with both sharp dissection and cautery, sent to pathology for evaluation  - Left ovarian Cyst present in posterior cul-de-sac, dissected with sharp dissection and cautery. Ruptured inadvertently during dissection, draining clear fluid  - Cyst and cyst wall excised, sent to pathology for evaluation  - Bilateral ureters identified with forward vermiculation visualized  - Hemostasis confirmed at close of case  - Seprafilm placed on surgical bed       Estimated Blood Loss: 100 mL         Specimens:   Specimen (12h ago, onward)    Start     Ordered    05/30/19 0910  Specimen to Pathology - Surgery  Once     Comments:  Specimen to Pathology: 1) right tube and ovary  - permanent , formalin , to refrigerator2. Left tube and ovary  - permanent , formalin , to refrigerator     Start Status     05/30/19 0910 Collected (05/30/19 0930) Order ID: [id number]       05/30/19 0936          Kalie Silva MD  Ob/Gyn PGY-3

## 2019-05-30 NOTE — TRANSFER OF CARE
"Anesthesia Transfer of Care Note    Patient: Ivy Salgado    Procedure(s) Performed: Procedure(s) (LRB):  SALPINGO-OOPHORECTOMY, BILATERAL (Bilateral)  LAPAROTOMY, EXPLORATORY (N/A)  LYSIS, ADHESIONS (N/A)    Patient location: PACU    Anesthesia Type: general    Transport from OR: Transported from OR on 6-10 L/min O2 by face mask with adequate spontaneous ventilation    Post pain: adequate analgesia    Post assessment: no apparent anesthetic complications    Post vital signs: stable    Level of consciousness: awake    Nausea/Vomiting: no nausea/vomiting    Complications: none    Transfer of care protocol was followed      Last vitals:   Visit Vitals  /86 (BP Location: Left arm, Patient Position: Lying)   Pulse 102   Temp 36.9 °C (98.5 °F) (Oral)   Resp 16   Ht 5' 1" (1.549 m)   Wt 52.6 kg (116 lb)   LMP 03/30/2015   SpO2 100%   Breastfeeding? No   BMI 21.92 kg/m²     "

## 2019-05-30 NOTE — PROGRESS NOTES
[facility]  Obstetrics & Gynecology  Progress Note    Patient Name: Ivy Salgado  MRN: [id number]  Admission Date: 5/30/2019  Primary Care Provider: Kalia Astorga MD  Principal Problem: <principal problem not specified>    Subjective:     Interval History:   Patient seen on afternoon rounds. She reports pain is moderately controlled. She is water sips without N/V. Ureña remains in place, she has yet to ambulate. She denies output per ileostomy.     Scheduled Meds:   famotidine  20 mg Oral BID    ibuprofen  800 mg Intravenous Q6H    [START ON 5/31/2019] ibuprofen  600 mg Oral Q6H    mirtazapine  30 mg Oral QHS    mupirocin  1 g Nasal BID     Continuous Infusions:   lactated ringers 125 mL/hr at 05/30/19 1042     PRN Meds:bisacodyl, clonazePAM, diphenhydrAMINE, diphenhydrAMINE, HYDROmorphone, ondansetron, oxyCODONE-acetaminophen, oxyCODONE-acetaminophen, promethazine (PHENERGAN) IVPB    Review of patient's allergies indicates:   Allergen Reactions    Azathioprine sodium Other (See Comments)     Other reaction(s): pancreatitis  Other reaction(s): pancreatitis    Methotrexate analogues Other (See Comments)     leukopenia    Stelara [ustekinumab] Other (See Comments)     Multiple infections    Zofran [ondansetron hcl (pf)] Other (See Comments)     Per patient causes prolong QT    Vancomycin analogues Hives    Morphine Itching and Other (See Comments)     Other reaction(s): Itching    Bactrim [sulfamethoxazole-trimethoprim] Palpitations    Ciprofloxacin Palpitations       Objective:     Vital Signs (Most Recent):  Temp: 98.7 °F (37.1 °C) (05/30/19 1359)  Pulse: 100 (05/30/19 1359)  Resp: 16 (05/30/19 1359)  BP: (!) 148/85 (05/30/19 1359)  SpO2: 95 % (05/30/19 1359) Vital Signs (24h Range):  Temp:  [97.5 °F (36.4 °C)-98.7 °F (37.1 °C)] 98.7 °F (37.1 °C)  Pulse:  [] 100  Resp:  [12-19] 16  SpO2:  [94 %-100 %] 95 %  BP: (111-148)/(61-87) 148/85     Weight: 52.6 kg (116 lb)  Body  mass index is 21.92 kg/m².  Patient's last menstrual period was 03/30/2015.    I&O (Last 24H):    Intake/Output Summary (Last 24 hours) at 5/30/2019 1506  Last data filed at 5/30/2019 1315  Gross per 24 hour   Intake 1628.75 ml   Output 600 ml   Net 1028.75 ml       Physical Exam:   Constitutional: She is oriented to person, place, and time. She appears well-developed and well-nourished.     Eyes: EOM are normal.    Neck: Normal range of motion.    Cardiovascular: Normal rate.     Pulmonary/Chest: Effort normal. No respiratory distress. She has no wheezes.        Abdominal: Soft. Bowel sounds are normal. She exhibits abdominal incision (vertical midline incision, covered in bandage, c/d/i ). She exhibits no distension. There is no tenderness. There is no rebound and no guarding.   Ileostomy in place, no output              Musculoskeletal: Normal range of motion.       Neurological: She is alert and oriented to person, place, and time.    Skin: Skin is warm and dry.    Psychiatric: She has a normal mood and affect. Her behavior is normal. Judgment and thought content normal.       Laboratory:  AM CBC  AM BMP        Assessment/Plan:     Active Diagnoses:    Diagnosis Date Noted POA    S/P ExLap, BSO, 5/30/19 [Z90.722] 05/30/2019 Not Applicable    Pelvic mass [R19.00] 10/29/2011 Yes      Problems Resolved During this Admission:       POD#0 s/p ExLap/BSO/JUAN  - Doing well. Afebrile, vital signs stable.  - Routine advances  - Pain PCA and edith'd tylenol  - Cont regular diet/IVFs  - Discontinue huffman in AM, Monitor UOP  - AM labs  - Encourage ambulation  - Prophylaxis: IS/TEDs/SCDs      Crohn's  - S/P total colectomy  - Avoid NSAIDs  - Monitor for ileostomy output       Depression/Anxiety  - Continue home clonaxepam   - Continue remeron      HTN  - No meds  - Monitor BP closely      GERD  - Continue zantac BID           Kalie Silva MD  Obstetrics & Gynecology  [facility]

## 2019-05-31 ENCOUNTER — PATIENT MESSAGE (OUTPATIENT)
Dept: SURGERY | Facility: CLINIC | Age: 39
End: 2019-05-31

## 2019-05-31 ENCOUNTER — PATIENT MESSAGE (OUTPATIENT)
Dept: GYNECOLOGIC ONCOLOGY | Facility: CLINIC | Age: 39
End: 2019-05-31

## 2019-05-31 LAB
ANION GAP SERPL CALC-SCNC: 9 MMOL/L (ref 8–16)
BASOPHILS # BLD AUTO: 0.02 K/UL (ref 0–0.2)
BASOPHILS # BLD AUTO: 0.02 K/UL (ref 0–0.2)
BASOPHILS NFR BLD: 0.2 % (ref 0–1.9)
BASOPHILS NFR BLD: 0.2 % (ref 0–1.9)
BILIRUB UR QL STRIP: NEGATIVE
BUN SERPL-MCNC: 7 MG/DL (ref 6–20)
CALCIUM SERPL-MCNC: 8.5 MG/DL (ref 8.7–10.5)
CHLORIDE SERPL-SCNC: 109 MMOL/L (ref 95–110)
CLARITY UR REFRACT.AUTO: CLEAR
CO2 SERPL-SCNC: 24 MMOL/L (ref 23–29)
COLOR UR AUTO: ABNORMAL
CREAT SERPL-MCNC: 0.7 MG/DL (ref 0.5–1.4)
DIFFERENTIAL METHOD: ABNORMAL
DIFFERENTIAL METHOD: ABNORMAL
EOSINOPHIL # BLD AUTO: 0 K/UL (ref 0–0.5)
EOSINOPHIL # BLD AUTO: 0 K/UL (ref 0–0.5)
EOSINOPHIL NFR BLD: 0 % (ref 0–8)
EOSINOPHIL NFR BLD: 0.2 % (ref 0–8)
ERYTHROCYTE [DISTWIDTH] IN BLOOD BY AUTOMATED COUNT: 14.4 % (ref 11.5–14.5)
ERYTHROCYTE [DISTWIDTH] IN BLOOD BY AUTOMATED COUNT: 14.5 % (ref 11.5–14.5)
EST. GFR  (AFRICAN AMERICAN): >60 ML/MIN/1.73 M^2
EST. GFR  (NON AFRICAN AMERICAN): >60 ML/MIN/1.73 M^2
GLUCOSE SERPL-MCNC: 92 MG/DL (ref 70–110)
GLUCOSE UR QL STRIP: NEGATIVE
HCT VFR BLD AUTO: 32.8 % (ref 37–48.5)
HCT VFR BLD AUTO: 33.6 % (ref 37–48.5)
HGB BLD-MCNC: 10.7 G/DL (ref 12–16)
HGB BLD-MCNC: 11 G/DL (ref 12–16)
HGB UR QL STRIP: ABNORMAL
IMM GRANULOCYTES # BLD AUTO: 0.01 K/UL (ref 0–0.04)
IMM GRANULOCYTES # BLD AUTO: 0.04 K/UL (ref 0–0.04)
IMM GRANULOCYTES NFR BLD AUTO: 0.1 % (ref 0–0.5)
IMM GRANULOCYTES NFR BLD AUTO: 0.3 % (ref 0–0.5)
KETONES UR QL STRIP: NEGATIVE
LACTATE SERPL-SCNC: 1.1 MMOL/L (ref 0.5–2.2)
LEUKOCYTE ESTERASE UR QL STRIP: NEGATIVE
LYMPHOCYTES # BLD AUTO: 1.5 K/UL (ref 1–4.8)
LYMPHOCYTES # BLD AUTO: 2.7 K/UL (ref 1–4.8)
LYMPHOCYTES NFR BLD: 12.3 % (ref 18–48)
LYMPHOCYTES NFR BLD: 30.9 % (ref 18–48)
MCH RBC QN AUTO: 28.5 PG (ref 27–31)
MCH RBC QN AUTO: 28.7 PG (ref 27–31)
MCHC RBC AUTO-ENTMCNC: 32.6 G/DL (ref 32–36)
MCHC RBC AUTO-ENTMCNC: 32.7 G/DL (ref 32–36)
MCV RBC AUTO: 87 FL (ref 82–98)
MCV RBC AUTO: 88 FL (ref 82–98)
MICROSCOPIC COMMENT: NORMAL
MONOCYTES # BLD AUTO: 1 K/UL (ref 0.3–1)
MONOCYTES # BLD AUTO: 1.2 K/UL (ref 0.3–1)
MONOCYTES NFR BLD: 14 % (ref 4–15)
MONOCYTES NFR BLD: 8 % (ref 4–15)
NEUTROPHILS # BLD AUTO: 4.8 K/UL (ref 1.8–7.7)
NEUTROPHILS # BLD AUTO: 9.4 K/UL (ref 1.8–7.7)
NEUTROPHILS NFR BLD: 54.6 % (ref 38–73)
NEUTROPHILS NFR BLD: 79.2 % (ref 38–73)
NITRITE UR QL STRIP: NEGATIVE
NRBC BLD-RTO: 0 /100 WBC
NRBC BLD-RTO: 0 /100 WBC
PH UR STRIP: 8 [PH] (ref 5–8)
PLATELET # BLD AUTO: 270 K/UL (ref 150–350)
PLATELET # BLD AUTO: 279 K/UL (ref 150–350)
PMV BLD AUTO: 9.3 FL (ref 9.2–12.9)
PMV BLD AUTO: 9.4 FL (ref 9.2–12.9)
POTASSIUM SERPL-SCNC: 4.2 MMOL/L (ref 3.5–5.1)
PROT UR QL STRIP: NEGATIVE
RBC # BLD AUTO: 3.73 M/UL (ref 4–5.4)
RBC # BLD AUTO: 3.86 M/UL (ref 4–5.4)
RBC #/AREA URNS AUTO: 1 /HPF (ref 0–4)
SODIUM SERPL-SCNC: 142 MMOL/L (ref 136–145)
SP GR UR STRIP: 1 (ref 1–1.03)
SQUAMOUS #/AREA URNS AUTO: 1 /HPF
URN SPEC COLLECT METH UR: ABNORMAL
WBC # BLD AUTO: 11.92 K/UL (ref 3.9–12.7)
WBC # BLD AUTO: 8.74 K/UL (ref 3.9–12.7)
WBC #/AREA URNS AUTO: 1 /HPF (ref 0–5)

## 2019-05-31 PROCEDURE — G0378 HOSPITAL OBSERVATION PER HR: HCPCS

## 2019-05-31 PROCEDURE — 81001 URINALYSIS AUTO W/SCOPE: CPT

## 2019-05-31 PROCEDURE — 25000003 PHARM REV CODE 250: Performed by: STUDENT IN AN ORGANIZED HEALTH CARE EDUCATION/TRAINING PROGRAM

## 2019-05-31 PROCEDURE — 20600001 HC STEP DOWN PRIVATE ROOM

## 2019-05-31 PROCEDURE — 63600175 PHARM REV CODE 636 W HCPCS: Performed by: STUDENT IN AN ORGANIZED HEALTH CARE EDUCATION/TRAINING PROGRAM

## 2019-05-31 PROCEDURE — 99024 POSTOP FOLLOW-UP VISIT: CPT | Mod: ,,, | Performed by: OBSTETRICS & GYNECOLOGY

## 2019-05-31 PROCEDURE — 87040 BLOOD CULTURE FOR BACTERIA: CPT | Mod: 59

## 2019-05-31 PROCEDURE — 63600175 PHARM REV CODE 636 W HCPCS: Performed by: OBSTETRICS & GYNECOLOGY

## 2019-05-31 PROCEDURE — 25000003 PHARM REV CODE 250: Performed by: OBSTETRICS & GYNECOLOGY

## 2019-05-31 PROCEDURE — 80048 BASIC METABOLIC PNL TOTAL CA: CPT

## 2019-05-31 PROCEDURE — 99024 PR POST-OP FOLLOW-UP VISIT: ICD-10-PCS | Mod: ,,, | Performed by: OBSTETRICS & GYNECOLOGY

## 2019-05-31 PROCEDURE — 85025 COMPLETE CBC W/AUTO DIFF WBC: CPT | Mod: 91

## 2019-05-31 PROCEDURE — 36415 COLL VENOUS BLD VENIPUNCTURE: CPT

## 2019-05-31 PROCEDURE — 83605 ASSAY OF LACTIC ACID: CPT

## 2019-05-31 RX ORDER — ACETAMINOPHEN 500 MG
1000 TABLET ORAL ONCE
Status: COMPLETED | OUTPATIENT
Start: 2019-05-31 | End: 2019-05-31

## 2019-05-31 RX ORDER — CEPHALEXIN 250 MG/1
250 CAPSULE ORAL NIGHTLY
Status: DISCONTINUED | OUTPATIENT
Start: 2019-05-31 | End: 2019-06-07 | Stop reason: HOSPADM

## 2019-05-31 RX ORDER — DRONABINOL 2.5 MG/1
5 CAPSULE ORAL 2 TIMES DAILY WITH MEALS
Status: DISCONTINUED | OUTPATIENT
Start: 2019-05-31 | End: 2019-06-07 | Stop reason: HOSPADM

## 2019-05-31 RX ORDER — LINEZOLID 2 MG/ML
600 INJECTION, SOLUTION INTRAVENOUS
Status: DISCONTINUED | OUTPATIENT
Start: 2019-05-31 | End: 2019-06-06

## 2019-05-31 RX ORDER — VALACYCLOVIR HYDROCHLORIDE 500 MG/1
500 TABLET, FILM COATED ORAL DAILY
Status: DISCONTINUED | OUTPATIENT
Start: 2019-05-31 | End: 2019-06-07 | Stop reason: HOSPADM

## 2019-05-31 RX ORDER — ZOLPIDEM TARTRATE 5 MG/1
10 TABLET ORAL NIGHTLY PRN
Status: DISCONTINUED | OUTPATIENT
Start: 2019-05-31 | End: 2019-06-07 | Stop reason: HOSPADM

## 2019-05-31 RX ORDER — DEXTROSE MONOHYDRATE, SODIUM CHLORIDE, AND POTASSIUM CHLORIDE 50; 1.49; 4.5 G/1000ML; G/1000ML; G/1000ML
INJECTION, SOLUTION INTRAVENOUS CONTINUOUS
Status: DISCONTINUED | OUTPATIENT
Start: 2019-05-31 | End: 2019-05-31

## 2019-05-31 RX ORDER — LOPERAMIDE HYDROCHLORIDE 2 MG/1
2 CAPSULE ORAL 4 TIMES DAILY PRN
Status: DISCONTINUED | OUTPATIENT
Start: 2019-05-31 | End: 2019-06-07 | Stop reason: HOSPADM

## 2019-05-31 RX ORDER — CALCIUM CARBONATE 200(500)MG
500 TABLET,CHEWABLE ORAL DAILY PRN
Status: DISCONTINUED | OUTPATIENT
Start: 2019-05-31 | End: 2019-06-07 | Stop reason: HOSPADM

## 2019-05-31 RX ADMIN — Medication: at 07:05

## 2019-05-31 RX ADMIN — PIPERACILLIN AND TAZOBACTAM 4.5 G: 4; .5 INJECTION, POWDER, LYOPHILIZED, FOR SOLUTION INTRAVENOUS; PARENTERAL at 11:05

## 2019-05-31 RX ADMIN — FAMOTIDINE 20 MG: 20 TABLET, FILM COATED ORAL at 08:05

## 2019-05-31 RX ADMIN — DRONABINOL 5 MG: 2.5 CAPSULE ORAL at 05:05

## 2019-05-31 RX ADMIN — MUPIROCIN 1 G: 20 OINTMENT TOPICAL at 08:05

## 2019-05-31 RX ADMIN — LINEZOLID 600 MG: 600 INJECTION, SOLUTION INTRAVENOUS at 11:05

## 2019-05-31 RX ADMIN — ACETAMINOPHEN 1000 MG: 500 TABLET ORAL at 09:05

## 2019-05-31 RX ADMIN — VALACYCLOVIR HYDROCHLORIDE 500 MG: 500 TABLET, FILM COATED ORAL at 03:05

## 2019-05-31 RX ADMIN — SODIUM CHLORIDE, SODIUM LACTATE, POTASSIUM CHLORIDE, AND CALCIUM CHLORIDE: .6; .31; .03; .02 INJECTION, SOLUTION INTRAVENOUS at 04:05

## 2019-05-31 RX ADMIN — DIPHENHYDRAMINE HYDROCHLORIDE 25 MG: 25 CAPSULE ORAL at 12:05

## 2019-05-31 RX ADMIN — SODIUM CHLORIDE, SODIUM LACTATE, POTASSIUM CHLORIDE, AND CALCIUM CHLORIDE: .6; .31; .03; .02 INJECTION, SOLUTION INTRAVENOUS at 06:05

## 2019-05-31 RX ADMIN — MIRTAZAPINE 30 MG: 15 TABLET, ORALLY DISINTEGRATING ORAL at 08:05

## 2019-05-31 RX ADMIN — CEPHALEXIN 250 MG: 250 CAPSULE ORAL at 08:05

## 2019-05-31 RX ADMIN — ACETAMINOPHEN 1000 MG: 500 TABLET ORAL at 10:05

## 2019-05-31 RX ADMIN — CLONAZEPAM 1 MG: 1 TABLET ORAL at 09:05

## 2019-05-31 RX ADMIN — SODIUM CHLORIDE, SODIUM LACTATE, POTASSIUM CHLORIDE, AND CALCIUM CHLORIDE: .6; .31; .03; .02 INJECTION, SOLUTION INTRAVENOUS at 10:05

## 2019-05-31 RX ADMIN — Medication: at 09:05

## 2019-05-31 NOTE — PLAN OF CARE
Future Appointments   Date Time Provider Department Center   6/11/2019  1:15 PM Rupa German MD [facility] GYN ONC [facility]   7/1/2019 11:00 AM Parish Ross MD [facility] GASTRO [address]   7/9/2019  9:00 AM NON-CHEMO 02 [facility] CHEMO [facility]   9/30/2019 12:00 PM Phu Obrien MD [facility] UROLOGY [facility]        05/31/19 1500   Final Note   Assessment Type Final Discharge Note   Anticipated Discharge Disposition Home   What phone number can be called within the next 1-3 days to see how you are doing after discharge?   ([phone])   Hospital Follow Up  Appt(s) scheduled? Yes

## 2019-05-31 NOTE — PROGRESS NOTES
Patient seen and examined.  Pain controlled with PCA, increased dose limit to 2mg/h. She is not narcotic naive. Ileostomy with good output, urinating adequately. Tolerating diet.  Clinically looks well, alert and oriented.   Temperature elevation of 103 noted.   CBC WNLs, lactate pending.   No indication for antibiotics at this time. Abdomen is benign and she clinically appears well.   Will continue to monitor.   Adjusted meds to include home medications (marinol, immodium, keflex, valtrex).

## 2019-05-31 NOTE — NURSING
05/31/19 1210   Vital Signs   Temp (!) 100.6 °F (38.1 °C)   Temp src Oral   Pulse (!) 115   Heart Rate Source Monitor   Resp 16   SpO2 100 %   Pulse Oximetry Type Intermittent   O2 Device (Oxygen Therapy) room air   BP (!) 130/92   MAP (mmHg) 105   BP Location Left arm   BP Method Automatic   Patient Position Sitting     Reported VS to Beverly Fishman MD

## 2019-05-31 NOTE — NURSING
05/31/19 1024   Vital Signs   Temp (!) 102.2 °F (39 °C)   Temp src Oral     Reported rechecked temperature to GYN-resident. The resident said to get a full set of VS, and administer acetaminophen if she has not reach her maximum dosage in 24 hours, and to monitor.

## 2019-05-31 NOTE — ANESTHESIA POSTPROCEDURE EVALUATION
Anesthesia Post Evaluation    Patient: Ivy Salgado    Procedure(s) Performed: Procedure(s) (LRB):  SALPINGO-OOPHORECTOMY, BILATERAL (Bilateral)  LAPAROTOMY, EXPLORATORY (N/A)  LYSIS, ADHESIONS (N/A)    Final Anesthesia Type: general  Patient location during evaluation: PACU  Patient participation: Yes- Able to Participate  Level of consciousness: awake and alert  Post-procedure vital signs: reviewed and stable  Pain management: adequate  Airway patency: patent  PONV status at discharge: No PONV  Anesthetic complications: no      Cardiovascular status: blood pressure returned to baseline  Respiratory status: unassisted  Hydration status: euvolemic  Follow-up not needed.          Vitals Value Taken Time   /92 5/31/2019 12:10 PM   Temp 38.1 °C (100.6 °F) 5/31/2019 12:10 PM   Pulse 115 5/31/2019 12:10 PM   Resp 16 5/31/2019 12:10 PM   SpO2 100 % 5/31/2019 12:10 PM         Event Time     Out of Recovery 13:45:30          Pain/Michael Score: Pain Rating Prior to Med Admin: 5 (5/30/2019 11:50 PM)  Pain Rating Post Med Admin: 5 (5/30/2019 11:59 PM)  Michael Score: 10 (5/30/2019  1:15 PM)

## 2019-05-31 NOTE — CARE UPDATE
Rapid Response Nurse Chart Check     Chart check completed, abnormal VS noted, bedside RN contacted, no concerns   verbalized at this time, instructed to call [phone] for further concerns or assistance.

## 2019-05-31 NOTE — NURSING
05/31/19 1042   Vital Signs   Temp (!) 103.2 °F (39.6 °C)   Temp src Oral   Pulse (!) 119  (patient just got up to tolmary, will recheck)   Heart Rate Source Monitor   Resp 16   SpO2 100 %   Pulse Oximetry Type Intermittent   Flow (L/min) 6   Oxygen Concentration (%) 100   O2 Device (Oxygen Therapy) room air   BP (!) 144/89   MAP (mmHg) 110   BP Location Left arm   BP Method Automatic   Patient Position Sitting     Reported full VS to resident as ordered after  a temperature elevation.

## 2019-05-31 NOTE — PLAN OF CARE
Patient is POD x 1 s/p Xlap/BSO.  On continuous IVF and PCA for pain control.  Febrile today.  CXR and u/a ordered.  PO abx started.  Advancing diet as tolerated.  Planning for potential d/c Sunday if fever is resolved and pain is tolerable.  No d/c needs anticipated at this time.  CM will continue to follow.

## 2019-05-31 NOTE — CARE UPDATE
Rapid Response Nurse Chart Check     Chart check completed, abnormal VS noted (Temperature 102.6, oral noted), bedside RN, contacted, no concerns verbalized at this time, instructed to call [phone] for further concerns or assistance.

## 2019-05-31 NOTE — ANESTHESIA RELEASE NOTE
"Anesthesia Release from PACU Note    Patient: Ivy Salgado    Procedure(s) Performed: Procedure(s) (LRB):  SALPINGO-OOPHORECTOMY, BILATERAL (Bilateral)  LAPAROTOMY, EXPLORATORY (N/A)  LYSIS, ADHESIONS (N/A)    Anesthesia type: general    Post pain: Adequate analgesia    Post assessment: no apparent anesthetic complications    Last Vitals:   Visit Vitals  BP (!) 130/92 (BP Location: Left arm, Patient Position: Sitting)   Pulse (!) 115   Temp (!) 38.1 °C (100.6 °F) (Oral)   Resp 16   Ht 5' 1" (1.549 m)   Wt 52.6 kg (116 lb)   LMP 03/30/2015   SpO2 100%   Breastfeeding? No   BMI 21.92 kg/m²       Post vital signs: stable    Level of consciousness: awake    Nausea/Vomiting: no nausea/no vomiting    Complications: none    Airway Patency: patent    Respiratory: unassisted    Cardiovascular: stable and blood pressure at baseline    Hydration: euvolemic  "

## 2019-05-31 NOTE — NURSING
05/31/19 0807   Vital Signs   Temp 100.3 °F (37.9 °C)   Temp src Oral   Pulse (!) 112   Heart Rate Source Monitor   Resp 16   SpO2 97 %   Pulse Oximetry Type Intermittent   O2 Device (Oxygen Therapy) room air   /88   MAP (mmHg) 106   BP Location Left arm   BP Method Automatic   Patient Position Lying        Reported temperature of 100.3 to medical team and resident.

## 2019-05-31 NOTE — PLAN OF CARE
Problem: Adult Inpatient Plan of Care  Goal: Plan of Care Review  Outcome: Ongoing (interventions implemented as appropriate)  Patient remained AAOx3 throughout the shift. Assessment completed & no alarming findings found. Several temperature elevation reported to medical team. All schedules/PRN medications administered as ordered. Tolerating a regular diet without any difficulty. Activity done independently. Voids per toliet;adequate output noted. One BM noted today.  side rails up x2; call bell in place; bed in lowest, locked position; skid proof socks on; no evidence of skin breakdown; care plan explained to patient; no additional complaints at this time. Will continue routine plan of care.

## 2019-05-31 NOTE — PLAN OF CARE
Problem: Adult Inpatient Plan of Care  Goal: Plan of Care Review  Outcome: Ongoing (interventions implemented as appropriate)  Plan of care reviewed with the patient and her mother at the beginning of shift. The patient is POD 1. Complaining of pain. Stated pain is well controlled with PCA pump. Also complained of itching. Benadryl administered. Denying all other complaints. VSS. Afebrile. Bed in low locked position. Call bell within reach. Will continue to monitor.

## 2019-05-31 NOTE — PROGRESS NOTES
[facility]  Obstetrics & Gynecology  Progress Note    Patient Name: Ivy Salgado  MRN: [id number]  Admission Date: 5/30/2019  Primary Care Provider: Kalia Astorga MD  Principal Problem: <principal problem not specified>    Subjective:     Interval History:   Patient reports awakening in pain, was not using PCA because she was sleeping. She reports dilaudid PCA usually works for her pain after major surgery.   She is otherwise tolerating PO. UOP adequate. Ileostomy with output present. No ambulation yet.       Scheduled Meds:   famotidine  20 mg Oral BID    mirtazapine  30 mg Oral QHS    mupirocin  1 g Nasal BID     Continuous Infusions:   hydromorphone in 0.9 % NaCl 6 mg/30 ml      lactated ringers 125 mL/hr at 05/31/19 0405     PRN Meds:bisacodyl, clonazePAM, diphenhydrAMINE, diphenhydrAMINE, naloxone, ondansetron, promethazine (PHENERGAN) IVPB    Review of patient's allergies indicates:   Allergen Reactions    Azathioprine sodium Other (See Comments)     Other reaction(s): pancreatitis  Other reaction(s): pancreatitis    Methotrexate analogues Other (See Comments)     leukopenia    Stelara [ustekinumab] Other (See Comments)     Multiple infections    Zofran [ondansetron hcl (pf)] Other (See Comments)     Per patient causes prolong QT    Vancomycin analogues Hives    Morphine Itching and Other (See Comments)     Other reaction(s): Itching    Bactrim [sulfamethoxazole-trimethoprim] Palpitations    Ciprofloxacin Palpitations       Objective:     Vital Signs (Most Recent):  Temp: 99.2 °F (37.3 °C) (05/31/19 0406)  Pulse: (!) 117 (05/31/19 0406)  Resp: 16 (05/31/19 0406)  BP: 126/80 (05/31/19 0406)  SpO2: (!) 94 % (05/31/19 0406) Vital Signs (24h Range):  Temp:  [97.5 °F (36.4 °C)-99.3 °F (37.4 °C)] 99.2 °F (37.3 °C)  Pulse:  [] 117  Resp:  [12-19] 16  SpO2:  [94 %-100 %] 94 %  BP: (111-148)/(61-99) 126/80     Weight: 52.6 kg (116 lb)  Body mass index is 21.92  kg/m².  Patient's last menstrual period was 03/30/2015.    I&O (Last 24H):    Intake/Output Summary (Last 24 hours) at 5/31/2019 0711  Last data filed at 5/31/2019 0408  Gross per 24 hour   Intake 3939.16 ml   Output 2150 ml   Net 1789.16 ml       Physical Exam:   Constitutional: She is oriented to person, place, and time. She appears well-developed and well-nourished.     Eyes: EOM are normal.    Neck: Normal range of motion.    Cardiovascular: Normal rate.     Pulmonary/Chest: Effort normal. No respiratory distress. She has no wheezes.        Abdominal: Soft. Bowel sounds are normal. She exhibits abdominal incision (vertical midline incision, covered in bandage, c/d/i ). She exhibits no distension. There is no tenderness. There is no rebound and no guarding.   Ileostomy in place, no output              Musculoskeletal: Normal range of motion.       Neurological: She is alert and oriented to person, place, and time.    Skin: Skin is warm and dry.    Psychiatric: She has a normal mood and affect. Her behavior is normal. Judgment and thought content normal.       Laboratory:  AM CBC  AM BMP        Assessment/Plan:     Active Diagnoses:    Diagnosis Date Noted POA    S/P ExLap, BSO, 5/30/19 [Z90.722] 05/30/2019 Not Applicable    Pelvic mass [R19.00] 10/29/2011 Yes      Problems Resolved During this Admission:       POD#1 s/p ExLap/BSO/JUAN  - Doing well. Afebrile, vital signs stable.  - Routine advances  - Continue PCA until pain better controlled, transition to PO later today  - Cont regular diet/DC IVFs  - Discontinue huffman in AM, UOP adequate 1.2 cc/kg/hr  - AM labs pending  - Encourage ambulation  - Prophylaxis: IS/TEDs/SCDs      Crohn's  - S/P total colectomy  - Avoid NSAIDs  - Monitor for ileostomy output       Depression/Anxiety  - Continue home clonaxepam   - Continue remeron      HTN  - No meds  - Monitor BP closely      GERD  - Continue zantac BID           Kalie Silva MD  Obstetrics &  Gynecology  [facility]

## 2019-06-01 LAB
ALBUMIN SERPL BCP-MCNC: 2.6 G/DL (ref 3.5–5.2)
ALP SERPL-CCNC: 103 U/L (ref 55–135)
ALT SERPL W/O P-5'-P-CCNC: 16 U/L (ref 10–44)
ANION GAP SERPL CALC-SCNC: 8 MMOL/L (ref 8–16)
AST SERPL-CCNC: 23 U/L (ref 10–40)
BASOPHILS # BLD AUTO: 0.03 K/UL (ref 0–0.2)
BASOPHILS NFR BLD: 0.2 % (ref 0–1.9)
BILIRUB SERPL-MCNC: 1.9 MG/DL (ref 0.1–1)
BUN SERPL-MCNC: 4 MG/DL (ref 6–20)
CALCIUM SERPL-MCNC: 8.7 MG/DL (ref 8.7–10.5)
CHLORIDE SERPL-SCNC: 101 MMOL/L (ref 95–110)
CO2 SERPL-SCNC: 26 MMOL/L (ref 23–29)
CREAT SERPL-MCNC: 0.7 MG/DL (ref 0.5–1.4)
DIFFERENTIAL METHOD: ABNORMAL
EOSINOPHIL # BLD AUTO: 0 K/UL (ref 0–0.5)
EOSINOPHIL NFR BLD: 0 % (ref 0–8)
ERYTHROCYTE [DISTWIDTH] IN BLOOD BY AUTOMATED COUNT: 14.6 % (ref 11.5–14.5)
EST. GFR  (AFRICAN AMERICAN): >60 ML/MIN/1.73 M^2
EST. GFR  (NON AFRICAN AMERICAN): >60 ML/MIN/1.73 M^2
GLUCOSE SERPL-MCNC: 122 MG/DL (ref 70–110)
HCT VFR BLD AUTO: 32.2 % (ref 37–48.5)
HGB BLD-MCNC: 10.7 G/DL (ref 12–16)
IMM GRANULOCYTES # BLD AUTO: 0.1 K/UL (ref 0–0.04)
IMM GRANULOCYTES NFR BLD AUTO: 0.6 % (ref 0–0.5)
LACTATE SERPL-SCNC: 0.9 MMOL/L (ref 0.5–2.2)
LYMPHOCYTES # BLD AUTO: 2 K/UL (ref 1–4.8)
LYMPHOCYTES NFR BLD: 12.5 % (ref 18–48)
MCH RBC QN AUTO: 29.2 PG (ref 27–31)
MCHC RBC AUTO-ENTMCNC: 33.2 G/DL (ref 32–36)
MCV RBC AUTO: 88 FL (ref 82–98)
MONOCYTES # BLD AUTO: 1 K/UL (ref 0.3–1)
MONOCYTES NFR BLD: 6 % (ref 4–15)
NEUTROPHILS # BLD AUTO: 13.1 K/UL (ref 1.8–7.7)
NEUTROPHILS NFR BLD: 80.7 % (ref 38–73)
NRBC BLD-RTO: 0 /100 WBC
PLATELET # BLD AUTO: 260 K/UL (ref 150–350)
PMV BLD AUTO: 9.7 FL (ref 9.2–12.9)
POTASSIUM SERPL-SCNC: 3.3 MMOL/L (ref 3.5–5.1)
PROT SERPL-MCNC: 6.2 G/DL (ref 6–8.4)
RBC # BLD AUTO: 3.67 M/UL (ref 4–5.4)
SODIUM SERPL-SCNC: 135 MMOL/L (ref 136–145)
WBC # BLD AUTO: 16.21 K/UL (ref 3.9–12.7)

## 2019-06-01 PROCEDURE — G0378 HOSPITAL OBSERVATION PER HR: HCPCS

## 2019-06-01 PROCEDURE — 25000003 PHARM REV CODE 250: Performed by: STUDENT IN AN ORGANIZED HEALTH CARE EDUCATION/TRAINING PROGRAM

## 2019-06-01 PROCEDURE — 25500020 PHARM REV CODE 255: Performed by: STUDENT IN AN ORGANIZED HEALTH CARE EDUCATION/TRAINING PROGRAM

## 2019-06-01 PROCEDURE — 63600175 PHARM REV CODE 636 W HCPCS: Performed by: OBSTETRICS & GYNECOLOGY

## 2019-06-01 PROCEDURE — 83605 ASSAY OF LACTIC ACID: CPT

## 2019-06-01 PROCEDURE — 85025 COMPLETE CBC W/AUTO DIFF WBC: CPT

## 2019-06-01 PROCEDURE — 99024 PR POST-OP FOLLOW-UP VISIT: ICD-10-PCS | Mod: ,,, | Performed by: OBSTETRICS & GYNECOLOGY

## 2019-06-01 PROCEDURE — 20600001 HC STEP DOWN PRIVATE ROOM

## 2019-06-01 PROCEDURE — 87040 BLOOD CULTURE FOR BACTERIA: CPT

## 2019-06-01 PROCEDURE — 63600175 PHARM REV CODE 636 W HCPCS: Performed by: STUDENT IN AN ORGANIZED HEALTH CARE EDUCATION/TRAINING PROGRAM

## 2019-06-01 PROCEDURE — 93005 ELECTROCARDIOGRAM TRACING: CPT

## 2019-06-01 PROCEDURE — 80053 COMPREHEN METABOLIC PANEL: CPT

## 2019-06-01 PROCEDURE — 99024 POSTOP FOLLOW-UP VISIT: CPT | Mod: ,,, | Performed by: OBSTETRICS & GYNECOLOGY

## 2019-06-01 PROCEDURE — 25000003 PHARM REV CODE 250: Performed by: OBSTETRICS & GYNECOLOGY

## 2019-06-01 PROCEDURE — 93010 ELECTROCARDIOGRAM REPORT: CPT | Mod: ,,, | Performed by: INTERNAL MEDICINE

## 2019-06-01 PROCEDURE — 93010 EKG 12-LEAD: ICD-10-PCS | Mod: ,,, | Performed by: INTERNAL MEDICINE

## 2019-06-01 RX ORDER — ACETAMINOPHEN 500 MG
1000 TABLET ORAL ONCE
Status: DISCONTINUED | OUTPATIENT
Start: 2019-06-01 | End: 2019-06-01

## 2019-06-01 RX ORDER — ACETAMINOPHEN 325 MG/1
650 TABLET ORAL ONCE
Status: COMPLETED | OUTPATIENT
Start: 2019-06-01 | End: 2019-06-01

## 2019-06-01 RX ORDER — HYDROMORPHONE HYDROCHLORIDE 2 MG/1
2 TABLET ORAL
Status: DISCONTINUED | OUTPATIENT
Start: 2019-06-01 | End: 2019-06-01

## 2019-06-01 RX ORDER — OXYCODONE AND ACETAMINOPHEN 5; 325 MG/1; MG/1
1 TABLET ORAL EVERY 4 HOURS PRN
Status: DISCONTINUED | OUTPATIENT
Start: 2019-06-01 | End: 2019-06-07 | Stop reason: HOSPADM

## 2019-06-01 RX ORDER — HYDROMORPHONE HYDROCHLORIDE 1 MG/ML
2 INJECTION, SOLUTION INTRAMUSCULAR; INTRAVENOUS; SUBCUTANEOUS
Status: DISCONTINUED | OUTPATIENT
Start: 2019-06-01 | End: 2019-06-07 | Stop reason: HOSPADM

## 2019-06-01 RX ORDER — ACETAMINOPHEN 500 MG
1000 TABLET ORAL ONCE
Status: COMPLETED | OUTPATIENT
Start: 2019-06-01 | End: 2019-06-01

## 2019-06-01 RX ORDER — SODIUM CHLORIDE, SODIUM LACTATE, POTASSIUM CHLORIDE, CALCIUM CHLORIDE 600; 310; 30; 20 MG/100ML; MG/100ML; MG/100ML; MG/100ML
INJECTION, SOLUTION INTRAVENOUS CONTINUOUS
Status: ACTIVE | OUTPATIENT
Start: 2019-06-01 | End: 2019-06-03

## 2019-06-01 RX ORDER — OXYCODONE AND ACETAMINOPHEN 10; 325 MG/1; MG/1
1 TABLET ORAL EVERY 4 HOURS PRN
Status: DISCONTINUED | OUTPATIENT
Start: 2019-06-01 | End: 2019-06-02

## 2019-06-01 RX ADMIN — LINEZOLID 600 MG: 600 INJECTION, SOLUTION INTRAVENOUS at 11:06

## 2019-06-01 RX ADMIN — IOHEXOL 75 ML: 350 INJECTION, SOLUTION INTRAVENOUS at 11:06

## 2019-06-01 RX ADMIN — PROMETHAZINE HYDROCHLORIDE 12.5 MG: 25 INJECTION INTRAMUSCULAR; INTRAVENOUS at 05:06

## 2019-06-01 RX ADMIN — PROMETHAZINE HYDROCHLORIDE 12.5 MG: 25 INJECTION INTRAMUSCULAR; INTRAVENOUS at 01:06

## 2019-06-01 RX ADMIN — MUPIROCIN 1 G: 20 OINTMENT TOPICAL at 09:06

## 2019-06-01 RX ADMIN — ACETAMINOPHEN 650 MG: 325 TABLET ORAL at 08:06

## 2019-06-01 RX ADMIN — Medication: at 09:06

## 2019-06-01 RX ADMIN — CLONAZEPAM 1 MG: 1 TABLET ORAL at 02:06

## 2019-06-01 RX ADMIN — Medication: at 03:06

## 2019-06-01 RX ADMIN — MIRTAZAPINE 30 MG: 15 TABLET, ORALLY DISINTEGRATING ORAL at 08:06

## 2019-06-01 RX ADMIN — IOHEXOL 15 ML: 350 INJECTION, SOLUTION INTRAVENOUS at 10:06

## 2019-06-01 RX ADMIN — DRONABINOL 5 MG: 2.5 CAPSULE ORAL at 08:06

## 2019-06-01 RX ADMIN — OXYCODONE HYDROCHLORIDE AND ACETAMINOPHEN 1 TABLET: 10; 325 TABLET ORAL at 02:06

## 2019-06-01 RX ADMIN — FAMOTIDINE 20 MG: 20 TABLET, FILM COATED ORAL at 08:06

## 2019-06-01 RX ADMIN — FAMOTIDINE 20 MG: 20 TABLET, FILM COATED ORAL at 09:06

## 2019-06-01 RX ADMIN — OXYCODONE HYDROCHLORIDE AND ACETAMINOPHEN 1 TABLET: 10; 325 TABLET ORAL at 05:06

## 2019-06-01 RX ADMIN — ACETAMINOPHEN 1000 MG: 500 TABLET ORAL at 12:06

## 2019-06-01 RX ADMIN — HYDROMORPHONE HYDROCHLORIDE 2 MG: 1 INJECTION, SOLUTION INTRAMUSCULAR; INTRAVENOUS; SUBCUTANEOUS at 11:06

## 2019-06-01 RX ADMIN — PROMETHAZINE HYDROCHLORIDE 12.5 MG: 25 INJECTION INTRAMUSCULAR; INTRAVENOUS at 10:06

## 2019-06-01 RX ADMIN — ACETAMINOPHEN 650 MG: 325 TABLET ORAL at 10:06

## 2019-06-01 RX ADMIN — SODIUM CHLORIDE, SODIUM LACTATE, POTASSIUM CHLORIDE, AND CALCIUM CHLORIDE: .6; .31; .03; .02 INJECTION, SOLUTION INTRAVENOUS at 08:06

## 2019-06-01 RX ADMIN — DIPHENHYDRAMINE HYDROCHLORIDE 25 MG: 25 CAPSULE ORAL at 09:06

## 2019-06-01 RX ADMIN — PIPERACILLIN AND TAZOBACTAM 4.5 G: 4; .5 INJECTION, POWDER, LYOPHILIZED, FOR SOLUTION INTRAVENOUS; PARENTERAL at 07:06

## 2019-06-01 RX ADMIN — PIPERACILLIN AND TAZOBACTAM 4.5 G: 4; .5 INJECTION, POWDER, LYOPHILIZED, FOR SOLUTION INTRAVENOUS; PARENTERAL at 05:06

## 2019-06-01 RX ADMIN — VALACYCLOVIR HYDROCHLORIDE 500 MG: 500 TABLET, FILM COATED ORAL at 09:06

## 2019-06-01 RX ADMIN — SODIUM CHLORIDE, SODIUM LACTATE, POTASSIUM CHLORIDE, AND CALCIUM CHLORIDE: .6; .31; .03; .02 INJECTION, SOLUTION INTRAVENOUS at 05:06

## 2019-06-01 RX ADMIN — DRONABINOL 5 MG: 2.5 CAPSULE ORAL at 05:06

## 2019-06-01 RX ADMIN — CEPHALEXIN 250 MG: 250 CAPSULE ORAL at 08:06

## 2019-06-01 NOTE — CARE UPDATE
Rapid Response Nurse Follow-up Note     Followed up with patient for proactive rounding.   Called by charge Jasper MCKEON. Dr. Fishman placed on phone.   Discussed sepsis workup, see MD note for plan of care going forward.  Reviewed plan of care with charge Jasper MCKEON.  Suggest [facility] consult for recs if patient declines, or is not responsive to current treatment plan.   Please call Rapid Response RN, Nelli Moody RN with any questions or concerns at [phone].

## 2019-06-01 NOTE — NURSING
Pt is continuing to have elevated temperatures. Cooling blanket in use, room temperature decreased. Tylenol administered. Physician seen pt at bedside. Plan of care to continue. Will continue to monitor.

## 2019-06-01 NOTE — NURSING
On call physician paged due to patient's continued elevated temperatures. Tylenol ordered. Cultures and chest x ray ordered. Antibiotics initiated. Will continue to monitor.

## 2019-06-01 NOTE — SUBJECTIVE & OBJECTIVE
Interval History:   Febrile throughout yesterday and overnight with no clear etiology. Feels drained from fever. Overall feels the same as yesterday, no worsening of pain. Ambulating, voiding without difficulty. Ostomy without output. Has not wanted to eat solid food but tolerating liquids without nausea/vomiting. Denies any CP or SOB. Having some gas pain but overall pain well controlled on PCA. Willing to try po pain medication today. Has been using IS.     Scheduled Meds:   cephALEXin  250 mg Oral QHS    dronabinol  5 mg Oral BID WM    famotidine  20 mg Oral BID    linezolid 600mg/300ml  600 mg Intravenous Q12H    mirtazapine  30 mg Oral QHS    mupirocin  1 g Nasal BID    piperacillin-tazobactam (ZOSYN) IVPB  4.5 g Intravenous Q8H    valACYclovir  500 mg Oral Daily     Continuous Infusions:   hydromorphone in 0.9 % NaCl 6 mg/30 ml      lactated ringers 125 mL/hr at 06/01/19 0547     PRN Meds:calcium carbonate, clonazePAM, diphenhydrAMINE, diphenhydrAMINE, loperamide, naloxone, ondansetron, promethazine (PHENERGAN) IVPB, zolpidem    Review of patient's allergies indicates:   Allergen Reactions    Azathioprine sodium Other (See Comments)     Other reaction(s): pancreatitis  Other reaction(s): pancreatitis    Methotrexate analogues Other (See Comments)     leukopenia    Stelara [ustekinumab] Other (See Comments)     Multiple infections    Zofran [ondansetron hcl (pf)] Other (See Comments)     Per patient causes prolong QT    Vancomycin analogues Hives    Morphine Itching and Other (See Comments)     Other reaction(s): Itching    Bactrim [sulfamethoxazole-trimethoprim] Palpitations    Ciprofloxacin Palpitations       Objective:     Vital Signs (Most Recent):  Temp: 100 °F (37.8 °C) (06/01/19 0556)  Pulse: (!) 122 (06/01/19 0430)  Resp: 16 (06/01/19 0430)  BP: 117/79 (06/01/19 0430)  SpO2: 97 % (06/01/19 0430) Vital Signs (24h Range):  Temp:  [100 °F (37.8 °C)-103.6 °F (39.8 °C)] 100 °F (37.8  °C)  Pulse:  [112-128] 122  Resp:  [16-18] 16  SpO2:  [93 %-100 %] 97 %  BP: (111-144)/(71-92) 117/79     Weight: 52.6 kg (116 lb)  Body mass index is 21.92 kg/m².    Intake/Output - Last 3 Shifts       05/30 0700 - 05/31 0659 05/31 0700 - 06/01 0659 06/01 0700 - 06/02 0659    P.O. 510 360     I.V. (mL/kg) 3704.2 (70.4) 1586.9 (30.2)     Total Intake(mL/kg) 4214.2 (80.1) 1946.9 (37)     Urine (mL/kg/hr) 2200 (1.7) 1000 (0.8)     Stool  150     Blood 100      Total Output 2300 1150     Net +1914.2 +796.9                     Physical Exam:   Constitutional: She is oriented to person, place, and time. She appears well-developed and well-nourished. No distress.   Resting comfortable. ETCO2 monitor in place      HENT:   Head: Normocephalic and atraumatic.    Eyes: Conjunctivae and EOM are normal.    Neck: Normal range of motion. Neck supple. No thyromegaly present.    Cardiovascular: Normal rate, regular rhythm and normal heart sounds.     Pulmonary/Chest: Effort normal and breath sounds normal. No respiratory distress.        Abdominal: Soft. She exhibits abdominal incision (c/d/i). She exhibits no distension. There is no tenderness.   Abdomen soft, non distended. Appropriate TTP    Ostomy site c/d/i with good output              Musculoskeletal: Normal range of motion and moves all extremeties. She exhibits no edema or tenderness.       Neurological: She is alert and oriented to person, place, and time.    Skin: Skin is warm and dry. No rash noted.    Psychiatric: She has a normal mood and affect. Her behavior is normal.       Lines/Drains/Airways     Central Venous Catheter Line                 Port A Cath Single Lumen 02/01/17 0800 left subclavian 849 days          Drain                 Ileostomy RLQ -- days         Ileostomy 06/16/12 0000 RLQ 2541 days                Laboratory:  BMP:   Recent Labs   Lab 05/31/19  0413 06/01/19  0428   GLU 92 122*    135*   K 4.2 3.3*    101   CO2 24 26   BUN 7 4*    CREATININE 0.7 0.7   CALCIUM 8.5* 8.7   , CBC:   Recent Labs   Lab 05/31/19  0413 05/31/19  1245 06/01/19  0428   WBC 8.74 11.92 16.21*   HGB 10.7* 11.0* 10.7*   HCT 32.8* 33.6* 32.2*    279 260    and Urine Studies:   Recent Labs   Lab 05/31/19  1322   COLORU Straw   APPEARANCEUA Clear   PHUR 8.0   SPECGRAV 1.005   PROTEINUA Negative   GLUCUA Negative   KETONESU Negative   BILIRUBINUA Negative   OCCULTUA 1+*   NITRITE Negative   LEUKOCYTESUR Negative   RBCUA 1   WBCUA 1   SQUAMEPITHEL 1       Diagnostic Results:  X-Ray: Reviewed  no signs of pneumonae. possible slight atelectasis.

## 2019-06-01 NOTE — PLAN OF CARE
Problem: Adult Inpatient Plan of Care  Goal: Plan of Care Review  Outcome: Ongoing (interventions implemented as appropriate)  Plan of care reviewed with the patient at the beginning of shift. The patient is complaining of pain, pt stated pain is well controlled with PCA. Pt is febrile, has been having an elevated temperature since the AM. Cultures obtained and ABX initiated. Tylenol administered twice. Cooling blanket placed in use. Bed in low locked position. Call bell within reach. Will continue to monitor.

## 2019-06-01 NOTE — PROGRESS NOTES
[facility]  Gynecologic Oncology  Progress Note      Patient Name: Ivy Salgado  MRN: [id number]  Admission Date: 5/30/2019  Hospital Length of Stay: 2 days  Attending Provider: Rupa German MD  Primary Care Provider: Kalia Astorga MD  Principal Problem: <principal problem not specified>    Follow-up For: Procedure(s) (LRB):  SALPINGO-OOPHORECTOMY, BILATERAL (Bilateral)  LAPAROTOMY, EXPLORATORY (N/A)  LYSIS, ADHESIONS (N/A)  Post-Operative Day: 2 Days Post-Op  Subjective:        Interval History:   Febrile throughout yesterday and overnight with no clear etiology. Feels drained from fever. Overall feels the same as yesterday, no worsening of pain. Ambulating, voiding without difficulty. Ostomy without output. Has not wanted to eat solid food but tolerating liquids without nausea/vomiting. Denies any CP or SOB. Having some gas pain but overall pain well controlled on PCA. Willing to try po pain medication today. Has been using IS.     Scheduled Meds:   cephALEXin  250 mg Oral QHS    dronabinol  5 mg Oral BID WM    famotidine  20 mg Oral BID    linezolid 600mg/300ml  600 mg Intravenous Q12H    mirtazapine  30 mg Oral QHS    mupirocin  1 g Nasal BID    piperacillin-tazobactam (ZOSYN) IVPB  4.5 g Intravenous Q8H    valACYclovir  500 mg Oral Daily     Continuous Infusions:   hydromorphone in 0.9 % NaCl 6 mg/30 ml      lactated ringers 125 mL/hr at 06/01/19 0547     PRN Meds:calcium carbonate, clonazePAM, diphenhydrAMINE, diphenhydrAMINE, loperamide, naloxone, ondansetron, promethazine (PHENERGAN) IVPB, zolpidem    Review of patient's allergies indicates:   Allergen Reactions    Azathioprine sodium Other (See Comments)     Other reaction(s): pancreatitis  Other reaction(s): pancreatitis    Methotrexate analogues Other (See Comments)     leukopenia    Stelara [ustekinumab] Other (See Comments)     Multiple infections    Zofran [ondansetron hcl (pf)] Other (See Comments)     Per  patient causes prolong QT    Vancomycin analogues Hives    Morphine Itching and Other (See Comments)     Other reaction(s): Itching    Bactrim [sulfamethoxazole-trimethoprim] Palpitations    Ciprofloxacin Palpitations       Objective:     Vital Signs (Most Recent):  Temp: 100 °F (37.8 °C) (06/01/19 0556)  Pulse: (!) 122 (06/01/19 0430)  Resp: 16 (06/01/19 0430)  BP: 117/79 (06/01/19 0430)  SpO2: 97 % (06/01/19 0430) Vital Signs (24h Range):  Temp:  [100 °F (37.8 °C)-103.6 °F (39.8 °C)] 100 °F (37.8 °C)  Pulse:  [112-128] 122  Resp:  [16-18] 16  SpO2:  [93 %-100 %] 97 %  BP: (111-144)/(71-92) 117/79     Weight: 52.6 kg (116 lb)  Body mass index is 21.92 kg/m².    Intake/Output - Last 3 Shifts       05/30 0700 - 05/31 0659 05/31 0700 - 06/01 0659 06/01 0700 - 06/02 0659    P.O. 510 360     I.V. (mL/kg) 3704.2 (70.4) 1586.9 (30.2)     Total Intake(mL/kg) 4214.2 (80.1) 1946.9 (37)     Urine (mL/kg/hr) 2200 (1.7) 1000 (0.8)     Stool  150     Blood 100      Total Output 2300 1150     Net +1914.2 +796.9                     Physical Exam:   Constitutional: She is oriented to person, place, and time. She appears well-developed and well-nourished. No distress.   Resting comfortable. ETCO2 monitor in place      HENT:   Head: Normocephalic and atraumatic.    Eyes: Conjunctivae and EOM are normal.    Neck: Normal range of motion. Neck supple. No thyromegaly present.    Cardiovascular: Normal rate, regular rhythm and normal heart sounds.     Pulmonary/Chest: Effort normal and breath sounds normal. No respiratory distress.        Abdominal: Soft. She exhibits abdominal incision (c/d/i). She exhibits no distension. There is no tenderness.   Abdomen soft, non distended. Appropriate TTP    Ostomy site c/d/i with good output              Musculoskeletal: Normal range of motion and moves all extremeties. She exhibits no edema or tenderness.       Neurological: She is alert and oriented to person, place, and time.    Skin: Skin is  warm and dry. No rash noted.    Psychiatric: She has a normal mood and affect. Her behavior is normal.       Lines/Drains/Airways     Central Venous Catheter Line                 Port A Cath Single Lumen 02/01/17 0800 left subclavian 849 days          Drain                 Ileostomy RLQ -- days         Ileostomy 06/16/12 0000 RLQ 2541 days                Laboratory:  BMP:   Recent Labs   Lab 05/31/19  0413 06/01/19  0428   GLU 92 122*    135*   K 4.2 3.3*    101   CO2 24 26   BUN 7 4*   CREATININE 0.7 0.7   CALCIUM 8.5* 8.7   , CBC:   Recent Labs   Lab 05/31/19  0413 05/31/19  1245 06/01/19  0428   WBC 8.74 11.92 16.21*   HGB 10.7* 11.0* 10.7*   HCT 32.8* 33.6* 32.2*    279 260    and Urine Studies:   Recent Labs   Lab 05/31/19  1322   COLORU Straw   APPEARANCEUA Clear   PHUR 8.0   SPECGRAV 1.005   PROTEINUA Negative   GLUCUA Negative   KETONESU Negative   BILIRUBINUA Negative   OCCULTUA 1+*   NITRITE Negative   LEUKOCYTESUR Negative   RBCUA 1   WBCUA 1   SQUAMEPITHEL 1       Diagnostic Results:  X-Ray: Reviewed  no signs of pneumonae. possible slight atelectasis.     Assessment/Plan:     POD#2  s/p ExLap/BSO/JUAN  - Doing fair  - Febrile overnight with no clear source   - Tolerating po without n/v  - good ostomy output   - ambulating and voiding without difficulty  - post op H/H stable 10/34  - pain controlled with PCA pump. Patient to transition to po pain medication this afternoon  - SCDs for DVT ppx     Post operative Fever:  - Febrile yesterday and overnight, as high to 103  - patient clinically appears non septic or toxic appearing  - BP stable, not hypotensive   - blood, urine, sputum cultures collected  - CXR overall normal   - O2 %, do not suspect PE   - LA 1.1>0.9  - WBC 11>16  - received po tylenol 2g, overnight  - temp this AM 98.2, pulse normal   - had cooling blanket overnight  - started on empiric abx zosyn and linezolid (patient allergic to vancomycin), plan to keep on for at  least 48 hours afebrile  - fever does not clinically appear to be infectious source at this time. Patient denies febrile response in past surgeries. Did receive IV tylenol after surgery, possibly masking immediate post op fever? No unusual anesthesia drugs or antibiotics used at time of surgery for febrile reaction.   - possibly inflammatory response 2/2 to underlying autoimmune disorder, although temp extremely high, continue to r/o infectious etiology.   - curb sided both MICU and SICU overnight as well as RRN, no further recommendations  - if continues to remain febrile through IV abx, will get CT Abdomen/pelvis with IV and oral contrast.         Crohn's  - S/P total colectomy  - Avoid NSAIDs  - Monitor for ileostomy output         Depression/Anxiety  - Continue home clonaxepam   - Continue remeron  - continue home ambiens         HTN  - No meds  - Monitor BP closely        GERD  - Continue zantac BID     HSV  - continue home valtrex      VTE Risk Mitigation (From admission, onward)        Ordered     Place sequential compression device  Until discontinued      05/30/19 0545     Place JEANNA hose  Until discontinued      05/30/19 0516          Beverly Fishman MD  Gynecologic Oncology  [facility]

## 2019-06-01 NOTE — PROGRESS NOTES
MD to bedside. Patient continues to have high grade temperatures despite antipyretics and cooling blanket. Resting comfortably in bed on arrival. States she feels similar to how she felt earlier in the day when Dr. German and I saw her, no worse. Endorses chills. Denies any CP, SOB. Moderate abdominal pain, well relieved with PCA pump. Patient is ambulating to bathroom and voiding without difficulty. Denies any febrile reaction to surgery in the past. Current O2 Sat 100% on arrival. BP normotensive. Patient does not appear acutely toxic. Incision c/d/i. Abdomen, soft non distended, appropriately tender to palpation. tachycardia noted, lungs CTAB, good respiratory effort. SCDs and cooling blanket in place. No calf tenderness.     Plan:  - continue to monitor  - receiving 1g tylenol currently. Cannot have NSAIDs  - blood cultures, urine culture, sputum culture pending  - continue IV fluids, cooling blanket, and broad spectrum abx  - CXR negative, mild atelectasis  - encourage IS usage  - wound appears c/d/i   - non acute abdomen  - patient clinically looks overall stable  - will continue to monitor    Beverly FRANCIS  PGY2

## 2019-06-01 NOTE — NURSING
Spoke to Dr Fishman. Ok to give pain medication early . She will order Iv break thru medication for patient if needed . patient states pain is 8 after ambulating to bathroom

## 2019-06-01 NOTE — CARE UPDATE
"RAPID RESPONSE NURSE PROACTIVE ROUNDING NOTE     Time of Visit:     Admit Date: 2019  LOS: 2  Code Status: Full Code   Date of Visit: 2019  : 1980  Age: 38 y.o.  Sex: female  Race: White  Bed: 376/859A:   MRN: [id number]  Was the patient discharged from an ICU this admission? no   Was the patient discharged from a PACU within last 24 hours?  no  Did the patient receive conscious sedation/general anesthesia in last 24 hours?  yes  Was the patient in the ED within the past 24 hours?  no  Was the patient started on NIPPV within the past 24 hours?  no  Attending Physician: Rupa German MD  Primary Service: Networked reference to record PCT     ASSESSMENT     Diagnosis: <principal problem not specified>    Abnormal Vital Signs: /71 (BP Location: Right arm, Patient Position: Lying)   Pulse (!) 119   Temp (!) 102.8 °F (39.3 °C) (Oral)   Resp 18   Ht 5' 1" (1.549 m)   Wt 52.6 kg (116 lb)   LMP 2015   SpO2 99%   Breastfeeding? No   BMI 21.92 kg/m²      Clinical Issues: Febrile, sepsis?    Patient  has a past medical history of Abnormal Pap smear, Abnormal Pap smear, Anemia, Anxiety, Arthritis, C. difficile diarrhea, Crohn's disease, Depression, Encounter for blood transfusion, Genital HSV, History of colposcopy with cervical biopsy, Hypertension, Kidney stone, Kidney stone, Recurrent UTI, S/P ileostomy, and Sterilization.    Called to bedside by charge RN with concern for possible sepsis due to fever.     INTERVENTIONS/ RECOMMENDATIONS     Upon entering room,  on couch, patient lying in bed, c/o of rib pain otherwise no issues. Dilaudid PCA and LR @ 125 ml/hr infusing. RR 14. EtCO2 42. According to recent note, primary is aware of post-op fever and tylenol is ordered. Patient is currently on: Cephalexin and Valacyclovir.   Suggest that primary team complete a sepsis workup.     Discussed plan of care with charge Jasper MCKEON.    PHYSICIAN ESCALATION     Yes/No  " no    Disposition: Remain in room 859.    FOLLOW-UP     Call back the Rapid Response Nurse, Nelli Moody RN at [phone] for additional questions or concerns.

## 2019-06-02 LAB
ALBUMIN SERPL BCP-MCNC: 2.6 G/DL (ref 3.5–5.2)
ALP SERPL-CCNC: 119 U/L (ref 55–135)
ALT SERPL W/O P-5'-P-CCNC: 14 U/L (ref 10–44)
ANION GAP SERPL CALC-SCNC: 9 MMOL/L (ref 8–16)
AST SERPL-CCNC: 20 U/L (ref 10–40)
BASOPHILS # BLD AUTO: 0.04 K/UL (ref 0–0.2)
BASOPHILS # BLD AUTO: 0.04 K/UL (ref 0–0.2)
BASOPHILS NFR BLD: 0.3 % (ref 0–1.9)
BASOPHILS NFR BLD: 0.3 % (ref 0–1.9)
BILIRUB SERPL-MCNC: 0.6 MG/DL (ref 0.1–1)
BUN SERPL-MCNC: 4 MG/DL (ref 6–20)
CALCIUM SERPL-MCNC: 8.9 MG/DL (ref 8.7–10.5)
CHLORIDE SERPL-SCNC: 104 MMOL/L (ref 95–110)
CO2 SERPL-SCNC: 24 MMOL/L (ref 23–29)
CREAT SERPL-MCNC: 0.8 MG/DL (ref 0.5–1.4)
DIFFERENTIAL METHOD: ABNORMAL
DIFFERENTIAL METHOD: ABNORMAL
EOSINOPHIL # BLD AUTO: 0.1 K/UL (ref 0–0.5)
EOSINOPHIL # BLD AUTO: 0.1 K/UL (ref 0–0.5)
EOSINOPHIL NFR BLD: 0.7 % (ref 0–8)
EOSINOPHIL NFR BLD: 0.7 % (ref 0–8)
ERYTHROCYTE [DISTWIDTH] IN BLOOD BY AUTOMATED COUNT: 14.5 % (ref 11.5–14.5)
ERYTHROCYTE [DISTWIDTH] IN BLOOD BY AUTOMATED COUNT: 14.5 % (ref 11.5–14.5)
EST. GFR  (AFRICAN AMERICAN): >60 ML/MIN/1.73 M^2
EST. GFR  (NON AFRICAN AMERICAN): >60 ML/MIN/1.73 M^2
GLUCOSE SERPL-MCNC: 92 MG/DL (ref 70–110)
HCT VFR BLD AUTO: 32.5 % (ref 37–48.5)
HCT VFR BLD AUTO: 32.5 % (ref 37–48.5)
HGB BLD-MCNC: 10.4 G/DL (ref 12–16)
HGB BLD-MCNC: 10.4 G/DL (ref 12–16)
IMM GRANULOCYTES # BLD AUTO: 0.05 K/UL (ref 0–0.04)
IMM GRANULOCYTES # BLD AUTO: 0.05 K/UL (ref 0–0.04)
IMM GRANULOCYTES NFR BLD AUTO: 0.3 % (ref 0–0.5)
IMM GRANULOCYTES NFR BLD AUTO: 0.3 % (ref 0–0.5)
LACTATE SERPL-SCNC: 0.8 MMOL/L (ref 0.5–2.2)
LYMPHOCYTES # BLD AUTO: 2.6 K/UL (ref 1–4.8)
LYMPHOCYTES # BLD AUTO: 2.6 K/UL (ref 1–4.8)
LYMPHOCYTES NFR BLD: 17.7 % (ref 18–48)
LYMPHOCYTES NFR BLD: 17.7 % (ref 18–48)
MCH RBC QN AUTO: 28.9 PG (ref 27–31)
MCH RBC QN AUTO: 28.9 PG (ref 27–31)
MCHC RBC AUTO-ENTMCNC: 32 G/DL (ref 32–36)
MCHC RBC AUTO-ENTMCNC: 32 G/DL (ref 32–36)
MCV RBC AUTO: 90 FL (ref 82–98)
MCV RBC AUTO: 90 FL (ref 82–98)
MONOCYTES # BLD AUTO: 0.9 K/UL (ref 0.3–1)
MONOCYTES # BLD AUTO: 0.9 K/UL (ref 0.3–1)
MONOCYTES NFR BLD: 6.4 % (ref 4–15)
MONOCYTES NFR BLD: 6.4 % (ref 4–15)
NEUTROPHILS # BLD AUTO: 10.9 K/UL (ref 1.8–7.7)
NEUTROPHILS # BLD AUTO: 10.9 K/UL (ref 1.8–7.7)
NEUTROPHILS NFR BLD: 74.6 % (ref 38–73)
NEUTROPHILS NFR BLD: 74.6 % (ref 38–73)
NRBC BLD-RTO: 0 /100 WBC
NRBC BLD-RTO: 0 /100 WBC
PLATELET # BLD AUTO: 296 K/UL (ref 150–350)
PLATELET # BLD AUTO: 296 K/UL (ref 150–350)
PMV BLD AUTO: 9.7 FL (ref 9.2–12.9)
PMV BLD AUTO: 9.7 FL (ref 9.2–12.9)
POTASSIUM SERPL-SCNC: 3.5 MMOL/L (ref 3.5–5.1)
PROT SERPL-MCNC: 6.9 G/DL (ref 6–8.4)
RBC # BLD AUTO: 3.6 M/UL (ref 4–5.4)
RBC # BLD AUTO: 3.6 M/UL (ref 4–5.4)
SODIUM SERPL-SCNC: 137 MMOL/L (ref 136–145)
WBC # BLD AUTO: 14.55 K/UL (ref 3.9–12.7)
WBC # BLD AUTO: 14.55 K/UL (ref 3.9–12.7)

## 2019-06-02 PROCEDURE — 25000003 PHARM REV CODE 250: Performed by: STUDENT IN AN ORGANIZED HEALTH CARE EDUCATION/TRAINING PROGRAM

## 2019-06-02 PROCEDURE — 83605 ASSAY OF LACTIC ACID: CPT

## 2019-06-02 PROCEDURE — G0378 HOSPITAL OBSERVATION PER HR: HCPCS

## 2019-06-02 PROCEDURE — 99024 POSTOP FOLLOW-UP VISIT: CPT | Mod: ,,, | Performed by: OBSTETRICS & GYNECOLOGY

## 2019-06-02 PROCEDURE — 20600001 HC STEP DOWN PRIVATE ROOM

## 2019-06-02 PROCEDURE — 85025 COMPLETE CBC W/AUTO DIFF WBC: CPT

## 2019-06-02 PROCEDURE — 80053 COMPREHEN METABOLIC PANEL: CPT

## 2019-06-02 PROCEDURE — 25500020 PHARM REV CODE 255: Performed by: OBSTETRICS & GYNECOLOGY

## 2019-06-02 PROCEDURE — 63600175 PHARM REV CODE 636 W HCPCS: Performed by: STUDENT IN AN ORGANIZED HEALTH CARE EDUCATION/TRAINING PROGRAM

## 2019-06-02 PROCEDURE — 99024 PR POST-OP FOLLOW-UP VISIT: ICD-10-PCS | Mod: ,,, | Performed by: OBSTETRICS & GYNECOLOGY

## 2019-06-02 PROCEDURE — 25000003 PHARM REV CODE 250: Performed by: OBSTETRICS & GYNECOLOGY

## 2019-06-02 PROCEDURE — 63600175 PHARM REV CODE 636 W HCPCS: Performed by: OBSTETRICS & GYNECOLOGY

## 2019-06-02 RX ORDER — OXYCODONE AND ACETAMINOPHEN 10; 325 MG/1; MG/1
1 TABLET ORAL EVERY 4 HOURS PRN
Status: DISCONTINUED | OUTPATIENT
Start: 2019-06-02 | End: 2019-06-07 | Stop reason: HOSPADM

## 2019-06-02 RX ORDER — OXYCODONE HYDROCHLORIDE 10 MG/1
10 TABLET ORAL EVERY 4 HOURS PRN
Status: DISCONTINUED | OUTPATIENT
Start: 2019-06-02 | End: 2019-06-02

## 2019-06-02 RX ORDER — ACETAMINOPHEN 500 MG
1000 TABLET ORAL ONCE
Status: COMPLETED | OUTPATIENT
Start: 2019-06-02 | End: 2019-06-02

## 2019-06-02 RX ADMIN — LINEZOLID 600 MG: 600 INJECTION, SOLUTION INTRAVENOUS at 11:06

## 2019-06-02 RX ADMIN — VALACYCLOVIR HYDROCHLORIDE 500 MG: 500 TABLET, FILM COATED ORAL at 08:06

## 2019-06-02 RX ADMIN — OXYCODONE AND ACETAMINOPHEN 1 TABLET: 10; 325 TABLET ORAL at 06:06

## 2019-06-02 RX ADMIN — OXYCODONE AND ACETAMINOPHEN 1 TABLET: 10; 325 TABLET ORAL at 11:06

## 2019-06-02 RX ADMIN — PIPERACILLIN AND TAZOBACTAM 4.5 G: 4; .5 INJECTION, POWDER, LYOPHILIZED, FOR SOLUTION INTRAVENOUS; PARENTERAL at 08:06

## 2019-06-02 RX ADMIN — ZOLPIDEM TARTRATE 10 MG: 5 TABLET ORAL at 12:06

## 2019-06-02 RX ADMIN — LINEZOLID 600 MG: 600 INJECTION, SOLUTION INTRAVENOUS at 12:06

## 2019-06-02 RX ADMIN — OXYCODONE HYDROCHLORIDE 10 MG: 10 TABLET ORAL at 02:06

## 2019-06-02 RX ADMIN — DRONABINOL 5 MG: 2.5 CAPSULE ORAL at 04:06

## 2019-06-02 RX ADMIN — OXYCODONE HYDROCHLORIDE 10 MG: 10 TABLET ORAL at 12:06

## 2019-06-02 RX ADMIN — HYDROMORPHONE HYDROCHLORIDE 2 MG: 1 INJECTION, SOLUTION INTRAMUSCULAR; INTRAVENOUS; SUBCUTANEOUS at 10:06

## 2019-06-02 RX ADMIN — CEPHALEXIN 250 MG: 250 CAPSULE ORAL at 08:06

## 2019-06-02 RX ADMIN — FAMOTIDINE 20 MG: 20 TABLET, FILM COATED ORAL at 08:06

## 2019-06-02 RX ADMIN — MIRTAZAPINE 30 MG: 15 TABLET, ORALLY DISINTEGRATING ORAL at 08:06

## 2019-06-02 RX ADMIN — PIPERACILLIN AND TAZOBACTAM 4.5 G: 4; .5 INJECTION, POWDER, LYOPHILIZED, FOR SOLUTION INTRAVENOUS; PARENTERAL at 01:06

## 2019-06-02 RX ADMIN — ACETAMINOPHEN 1000 MG: 500 TABLET ORAL at 08:06

## 2019-06-02 RX ADMIN — OXYCODONE HYDROCHLORIDE 10 MG: 10 TABLET ORAL at 08:06

## 2019-06-02 RX ADMIN — ZOLPIDEM TARTRATE 10 MG: 5 TABLET ORAL at 11:06

## 2019-06-02 RX ADMIN — HYDROMORPHONE HYDROCHLORIDE 2 MG: 1 INJECTION, SOLUTION INTRAMUSCULAR; INTRAVENOUS; SUBCUTANEOUS at 01:06

## 2019-06-02 RX ADMIN — HYDROMORPHONE HYDROCHLORIDE 2 MG: 1 INJECTION, SOLUTION INTRAMUSCULAR; INTRAVENOUS; SUBCUTANEOUS at 08:06

## 2019-06-02 RX ADMIN — MUPIROCIN 1 G: 20 OINTMENT TOPICAL at 08:06

## 2019-06-02 RX ADMIN — PIPERACILLIN AND TAZOBACTAM 4.5 G: 4; .5 INJECTION, POWDER, LYOPHILIZED, FOR SOLUTION INTRAVENOUS; PARENTERAL at 04:06

## 2019-06-02 RX ADMIN — SODIUM CHLORIDE, SODIUM LACTATE, POTASSIUM CHLORIDE, AND CALCIUM CHLORIDE 125 ML/HR: .6; .31; .03; .02 INJECTION, SOLUTION INTRAVENOUS at 04:06

## 2019-06-02 NOTE — CARE UPDATE
Rapid Response Nurse Follow-up Note     Followed up with bedside RNDallas regarding temp. Reported temp is improving and is currently 100.7. No acute issues at this time. Please call Rapid Response RN, Jam Crawford RN with any questions or concerns at [phone].

## 2019-06-02 NOTE — SUBJECTIVE & OBJECTIVE
Interval History:   Did well throughout the day yesterday. Febrile overnight again. Tachycardic to 155 overnight, EKG showed NSR. Received po tylenol with resolution in fever and tachycardia. Did have CT abdomen and pelvis overnight. Pain well controlled, worst with ambulating. Tolerated regular diet of lasagna last night without nausea/vomiting. Denies CP or SOB this AM.     Scheduled Meds:   cephALEXin  250 mg Oral QHS    dronabinol  5 mg Oral BID WM    famotidine  20 mg Oral BID    linezolid 600mg/300ml  600 mg Intravenous Q12H    mirtazapine  30 mg Oral QHS    mupirocin  1 g Nasal BID    piperacillin-tazobactam (ZOSYN) IVPB  4.5 g Intravenous Q8H    valACYclovir  500 mg Oral Daily     Continuous Infusions:   lactated ringers 125 mL/hr at 06/01/19 2023     PRN Meds:calcium carbonate, clonazePAM, diphenhydrAMINE, diphenhydrAMINE, HYDROmorphone, iohexol, loperamide, naloxone, ondansetron, oxyCODONE, oxyCODONE-acetaminophen, promethazine (PHENERGAN) IVPB, zolpidem    Review of patient's allergies indicates:   Allergen Reactions    Azathioprine sodium Other (See Comments)     Other reaction(s): pancreatitis  Other reaction(s): pancreatitis    Methotrexate analogues Other (See Comments)     leukopenia    Stelara [ustekinumab] Other (See Comments)     Multiple infections    Zofran [ondansetron hcl (pf)] Other (See Comments)     Per patient causes prolong QT    Vancomycin analogues Hives    Morphine Itching and Other (See Comments)     Other reaction(s): Itching    Bactrim [sulfamethoxazole-trimethoprim] Palpitations    Ciprofloxacin Palpitations       Objective:     Vital Signs (Most Recent):  Temp: 99.6 °F (37.6 °C) (06/02/19 0304)  Pulse: 107 (06/02/19 0333)  Resp: 16 (06/02/19 0304)  BP: 113/70 (06/02/19 0304)  SpO2: 98 % (06/02/19 0304) Vital Signs (24h Range):  Temp:  [97.9 °F (36.6 °C)-101.6 °F (38.7 °C)] 99.6 °F (37.6 °C)  Pulse:  [] 107  Resp:  [16-20] 16  SpO2:  [94 %-99 %] 98 %  BP:  (107-148)/(68-84) 113/70     Weight: 52.6 kg (116 lb)  Body mass index is 21.92 kg/m².    Intake/Output - Last 3 Shifts       05/31 0700 - 06/01 0659 06/01 0700 - 06/02 0659 06/02 0700 - 06/03 0659    P.O. 360      I.V. (mL/kg) 1586.9 (30.2)      Total Intake(mL/kg) 1946.9 (37)      Urine (mL/kg/hr) 1000 (0.8) 1250 (1)     Stool 150 75     Blood       Total Output 1150 1325     Net +796.9 -1325                     Physical Exam:   Constitutional: She is oriented to person, place, and time. She appears well-developed and well-nourished. No distress.   Resting comfortably in bed       HENT:   Head: Normocephalic and atraumatic.    Eyes: Conjunctivae and EOM are normal.    Neck: Normal range of motion. Neck supple. No thyromegaly present.    Cardiovascular: Normal rate, regular rhythm and normal heart sounds.     Pulmonary/Chest: Effort normal and breath sounds normal. No respiratory distress.        Abdominal: Soft. She exhibits abdominal incision (c/d/i). She exhibits no distension. There is no tenderness.   Abdomen soft, non distended. Appropriate TTP    Ostomy site c/d/i with good output              Musculoskeletal: Normal range of motion and moves all extremeties. She exhibits no edema or tenderness.       Neurological: She is alert and oriented to person, place, and time.    Skin: Skin is warm and dry. No rash noted.    Psychiatric: She has a normal mood and affect. Her behavior is normal.       Lines/Drains/Airways     Central Venous Catheter Line                 Port A Cath Single Lumen 02/01/17 0800 left subclavian 850 days          Drain                 Ileostomy RLQ -- days         Ileostomy 06/16/12 0000 RLQ 2542 days                Laboratory:  BMP:   Recent Labs   Lab 06/01/19  0428 06/02/19  0313   * 92   * 137   K 3.3* 3.5    104   CO2 26 24   BUN 4* 4*   CREATININE 0.7 0.8   CALCIUM 8.7 8.9   , CBC:   Recent Labs   Lab 05/31/19  1245 06/01/19  0428 06/02/19  0313   WBC 11.92 16.21*  14.55*  14.55*   HGB 11.0* 10.7* 10.4*  10.4*   HCT 33.6* 32.2* 32.5*  32.5*    260 296  296    and Urine Studies:   Recent Labs   Lab 05/31/19  1322   COLORU Straw   APPEARANCEUA Clear   PHUR 8.0   SPECGRAV 1.005   PROTEINUA Negative   GLUCUA Negative   KETONESU Negative   BILIRUBINUA Negative   OCCULTUA 1+*   NITRITE Negative   LEUKOCYTESUR Negative   RBCUA 1   WBCUA 1   SQUAMEPITHEL 1       Diagnostic Results:  CT Abdomen Pelvis:   EXAMINATION:  CT ABDOMEN PELVIS WITH CONTRAST    CLINICAL HISTORY:  post operative fever of unknown origin. Rule out abscess or bowel injury. Do not have high clinical suspicion based off of physical exam;    TECHNIQUE:  The patient was surveyed from the lung bases through the pelvis after the administration of 75 cc Omni 350 IV contrast as well as 30 cc Omni 350 oral contrast. The data was reconstructed for coronal, sagittal, and axial images.    COMPARISON:  MRI 05/15/2019; CT 12/06/2018; ultrasound 04/09/2019.    FINDINGS:  There are bandlike opacities within the right lower lobe consistent with atelectasis..  No pleural effusion is seen.    Central venous catheter tip partially visualized at the cavoatrial junction.  The visualized portions of the heart appear normal. No pericardial effusion.    The liver is normal in size and attenuation.  No focal hepatic abnormality is seen. The gallbladder is unremarkable.  No intrahepatic or extrahepatic biliary ductal dilatation is identified. The spleen is unremarkable.    The stomach, pancreas, and adrenal glands are unremarkable.    The kidneys are normal in size and location. There is a stable right renal cyst and bilateral subcentimeter hypodensities, too small to characterize but likely additional cysts.  Bilateral renal calculi are present measuring up to 0.3 cm on the right and 0.5 cm on the left.  There is mild left hydroureteronephrosis.  The right ureter appears normal in course and caliber. The urinary bladder is  unremarkable. The uterus is surgically absent.  There has been interval postsurgical changes of bilateral salpingo-oophorectomy.    There are postsurgical changes of colectomy with right lower quadrant end ileostomy.  No bowel obstruction.  In this patient with a history of Crohn's disease, evaluation for active bowel inflammation is limited by the presence of radiopaque oral contrast.    There is scattered foci of intraperitoneal free air, likely related to recent surgery.  Small volume free fluid is present throughout the abdomen and pelvis.  There is a large, peripherally enhancing fluid collection within the pelvis measuring 9.6 x 6.4 cm containing small foci of free air (axial image 65).  Additional smaller fluid collection is noted posteriorly on the left in the deep pelvis (axial image 66).  Additional collection present in the mid abdomen demonstrating mild rim enhancement measuring 4.1 x 3.3 cm (axial image 47).  Additional interloop fluid collections identified adjacent to a segment of small bowel in the left hemiabdomen (axial images 41 and 47).  No enteric contrast extravasation to suggest bowel injury.    There is no evidence of lymph node enlargement in the abdomen or pelvis.  The abdominal aorta is normal in course and caliber without significant atherosclerotic calcifications.    The osseous structures demonstrate no significant abnormality.  The extraperitoneal soft tissues demonstrate a healing midline abdominal incision.      Impression       1. In this patient with a history of recent bilateral salpingo-oophorectomy, there are nonspecific rim enhancing fluid collections in the abdomen and pelvis as discussed above, worrisome for abscesses or other postoperative fluid collections.  2. Scattered foci of intraperitoneal free air and small volume abdominal and pelvic free fluid, likely postsurgical in etiology.  3. Mild left hydroureteronephrosis, not seen on prior studies.  Etiology of this finding  is not definitively identified but could be related to pelvic inflammation.  Bilateral nonobstructing nephrolithiasis appear stable.  4. Postsurgical changes of hysterectomy and colectomy with right lower quadrant ileostomy.  5. Multiple additional findings as above.  This report was flagged in Epic as abnormal.    Electronically signed by resident: Mono Angelo  Date: 06/02/2019  Time: 00:23    Electronically signed by: Gerardo Chambers MD  Date: 06/02/2019  Time: 04:11

## 2019-06-02 NOTE — CARE UPDATE
Rapid Response Nurse Follow-up Note     Followed up with patient for proactive rounding.   No acute issues at this time. Reviewed plan of care with primary RN, Allyssa. Primary team paged R/T pain management and possible PCA pump.    Please call Rapid Response RN, Vidya Ogden RN with any questions or concerns at [phone].

## 2019-06-02 NOTE — CARE UPDATE
"RAPID RESPONSE NURSE PROACTIVE ROUNDING NOTE     Time of Visit: 0934    Admit Date: 2019  LOS: 3  Code Status: Full Code   Date of Visit: 2019  : 1980  Age: 38 y.o.  Sex: female  Race: White  Bed: 058/859A:   MRN: [id number]  Was the patient discharged from an ICU this admission? no   Was the patient discharged from a PACU within last 24 hours?  no  Did the patient receive conscious sedation/general anesthesia in last 24 hours?  no  Was the patient in the ED within the past 24 hours?  no  Was the patient started on NIPPV within the past 24 hours?  no  Attending Physician: Rupa German MD  Primary Service: Networked reference to record PCT     ASSESSMENT     Diagnosis: <principal problem not specified>    Abnormal Vital Signs: /75   Pulse (!) 115   Temp (!) 101.7 °F (38.7 °C) (Oral)   Resp 17   Ht 5' 1" (1.549 m)   Wt 52.6 kg (116 lb)   LMP 2015   SpO2 97%   Breastfeeding? No   BMI 21.92 kg/m²      Clinical Issues: Circulatory, Elevated Temperature     Patient  has a past medical history of Abnormal Pap smear, Abnormal Pap smear, Anemia, Anxiety, Arthritis, C. difficile diarrhea, Crohn's disease, Depression, Encounter for blood transfusion, Genital HSV, History of colposcopy with cervical biopsy, Hypertension, Kidney stone, Kidney stone, Recurrent UTI, S/P ileostomy, and Sterilization.    Pt AAOx4. Opens eyes spontaneously. Following all commands. Nods/gestures appropriately. Pt complains of pt to lower abdomen, near surgical incision. Abdomen tender. Active bowel sounds noted. Distension noted to LLQ. Colectomy productive. Oral temperature 100.2, taken per RRT RN. Pt off of cooling blanket at this time. Pt intermittently shaking with fever. Breath sounds clear, diminished, bilaterally. Pan cultures sent 6/1PM. CXR obtained. CTA (-), unremarkable. GYN team paged concerning pain management pain.      INTERVENTIONS/ RECOMMENDATIONS     Plan to continue Acetaminophen PRN for " fevers. Cooling blanket PRN. Await cultures. Pain management.     Discussed plan of care with RNAllyssa.    PHYSICIAN ESCALATION     Yes/No  no    Orders received and case discussed with NA.    Disposition: Remain in room 859 A.    FOLLOW-UP     Call back the Rapid Response Nurse, Vidya Ogden RN at [phone] for additional questions or concerns.

## 2019-06-02 NOTE — CARE UPDATE
"RAPID RESPONSE NURSE PROACTIVE ROUNDING NOTE     Time of Visit:     Admit Date: 2019  LOS: 2  Code Status: Full Code   Date of Visit: 2019  : 1980  Age: 38 y.o.  Sex: female  Race: White  Bed: 832/859A:   MRN: [id number]  Was the patient discharged from an ICU this admission? no   Was the patient discharged from a PACU within last 24 hours?  no  Did the patient receive conscious sedation/general anesthesia in last 24 hours?  yes  Was the patient in the ED within the past 24 hours?  no  Was the patient started on NIPPV within the past 24 hours?  no  Attending Physician: Rupa German MD  Primary Service: Networked reference to record PCT     ASSESSMENT     Diagnosis: <principal problem not specified>    Abnormal Vital Signs: BP (!) 148/84 (BP Location: Right arm, Patient Position: Lying)   Pulse (!) 155   Temp (!) 101.6 °F (38.7 °C) (Oral)   Resp 20   Ht 5' 1" (1.549 m)   Wt 52.6 kg (116 lb)   LMP 2015   SpO2 97%   Breastfeeding? No   BMI 21.92 kg/m²      Clinical Issues: Circulatory and elevated temp    Patient  has a past medical history of Abnormal Pap smear, Abnormal Pap smear, Anemia, Anxiety, Arthritis, C. difficile diarrhea, Crohn's disease, Depression, Encounter for blood transfusion, Genital HSV, History of colposcopy with cervical biopsy, Hypertension, Kidney stone, Kidney stone, Recurrent UTI, S/P ileostomy, and Sterilization.    Pt AAOX4 w/ minor c/o pain lower abdomen at surgical site. Pt on cooling blanket for temp of >101. Hr 120's and BP stable. Active bowel sounds w/ distention to lower abdomen. Abdomen soft w/ c/o tenderness and full body chills. Upper lungs sounds clear and lung bases diminished. Pan cultures pending.   INTERVENTIONS/ RECOMMENDATIONS     MD notified 650 mg of tylenol x1 ordered. Pt maxed on acetaminophen. No NSAIDS r/t Crohn's. Pt on cooling blanket and CT abdomen scheduled around 2300. Will f/u @ 2330 for repeat temp.  Discussed plan of care " with RNDallas and Charge Nurse Antwon.     PHYSICIAN ESCALATION     Yes/No  yes    Orders received and case discussed with RN.     Disposition: Remain in room 859.    FOLLOW-UP     Call back the Rapid Response Nurse, Jam Crawford RN at [phone] for additional questions or concerns.

## 2019-06-02 NOTE — PLAN OF CARE
Problem: Adult Inpatient Plan of Care  Goal: Plan of Care Review  Outcome: Ongoing (interventions implemented as appropriate)  Pt AAO; independent. Patient T max this shift was  103.0 this AM patient was  given tylenol and has resolved to 99.0 at 1600  . Antibiotics continue. IV fluids continue .  Midline incision clean intact and approximate Patient up to shower and walked in hallway this shift. Patient requiring Q 4 PO pain meds and break thru IV pain meds for pain rating of 7- 8 . Possible procedure in IR tomorrow .   Bed lowest position and locked.  Call light in reach.  [facility]

## 2019-06-02 NOTE — NURSING
Pt temperature still elevated. Cooling blanket in use. On call contacted. Tylenol ordered. Will continue to monitor.

## 2019-06-02 NOTE — CONSULTS
Radiology Consult    Ivy Salgado is a 38 y.o. female with a history of Chron's disease with total colectomy and illiostomy. On 5/31 she underwent BSO. She became febrile and CT abdomen was done showing multiple fluid collections in the abdomen and pelvis. IR was consulted for drainage/Aspiration.     Past Medical History:   Diagnosis Date    Abnormal Pap smear 2007    Abnormal Pap smear 5/26/2011    Anemia     Anxiety     Arthritis     C. difficile diarrhea     Crohn's disease     Depression 8/5/2017    Encounter for blood transfusion     Genital HSV     History of colposcopy with cervical biopsy 2007 and 7/2011 2007-LYLA I  and 7/2011- LYLA I    Hypertension     Kidney stone     Kidney stone     Recurrent UTI 4/3/2013    S/P ileostomy 7/9/2012    Sterilization 6/23/2012     Past Surgical History:   Procedure Laterality Date    ABDOMINAL SURGERY      APPENDECTOMY      BLADDER SURGERY      partial cystectomy due to fistula    CKC      COLON SURGERY      COLONOSCOPY      EGD (ESOPHAGOGASTRODUODENOSCOPY) N/A 9/6/2013    Performed [facility] at [facility] (4TH FLR)    ESOPHAGOGASTRODUODENOSCOPY (EGD) N/A 10/14/2016    Performed by [facility]elle Mcpherson [facility] at [facility] (2ND FLR)    ESOPHAGOGASTRODUODENOSCOPY (EGD) N/A 10/23/2014    Performed by Nathanael Mitchell MD at [facility] (2ND FLR)    EXAM UNDER ANESTHESIA N/A 8/29/2013    Performed by [facility]ons [facility] at [facility] OR 2ND FLR    EXAM UNDER ANESTHESIA N/A 1/18/2013    [facility]formed [facility] at [facility] OR 2ND FLR    HYSTERECTOMY-ABDOMINAL-TOTAL (SHELLIE) N/A 4/16/2015    [facility] OR    ILEOSCOPY N/A 8/8/2017    Performed by Tommie Klein MD at [facility] (2ND FLR)    ILEOSCOPY N/A 10/14/2016    Performed by Janlele Mcpherson MD at [facility] (2ND FLR)    ILEOSCOPY N/A 9/25/2013    Performed by Emilio Cameron [facility] at [facility] (4TH FLR)    ILEOSTOMY      INCISION AND DRAINAGE, ABSCESS N/A  8/29/2013    Per[facility] by [facility] at [facility] 2ND F[facility]    FBFRCZOWD-MBVY-G-CATH Left 2/21/2017    Performed by Parish Sanchez Jr., MD at [facility] OR 2ND FLR    LAPAROTOMY, EXPLORATORY N/A 5/30/2019    Performed by Rupa German MD at [facility] OR 2ND FLR    LYSIS, ADHESIONS N/A 5/30/2019    Performed by Rupa German MD at [facility] OR 2ND FLR    OOPHORECTOMY Right 04/16/2015    PORTACATH PLACEMENT  02/21/2017    REPAIR-BLADDER  4/16/2015    Performed [facility]    SALPINGO-OOPHERECTOMY Right 4/16/2015    [facility]    SALPINGO-OOPHORECTOMY, BILATERAL Bilateral 5/30/2019    Performed by Rupa German [facility] at [facility] OR 2ND FLR    SIGMOIDOSCOPY, FLEXIBLE N/A 2/7/2014    Performed by Erasto Sewell MD at [facility] ENDO (2ND FLR)    SKIN BIOPSY      SMALL INTESTINE SURGERY      age 18 Y    TOTAL ABDOMINAL HYSTERECTOMY  04/16/2015    TOTAL COLECTOMY      TUBAL LIGATION  06/06/2012    UPPER GASTROINTESTINAL ENDOSCOPY         Discussed with OB/GYN team, Dr. Herrera.    Case discussed with Radiology staff, Dr. Montano.     Procedure: IR drainage of abdominopelvic collections.     Scheduled Meds:    cephALEXin  250 mg Oral QHS    dronabinol  5 mg Oral BID WM    famotidine  20 mg Oral BID    linezolid 600mg/300ml  600 mg Intravenous Q12H    mirtazapine  30 mg Oral QHS    mupirocin  1 g Nasal BID    piperacillin-tazobactam (ZOSYN) IVPB  4.5 g Intravenous Q8H    valACYclovir  500 mg Oral Daily     Continuous Infusions:    lactated ringers 125 mL/hr at 06/01/19 2023     PRN Meds:calcium carbonate, clonazePAM, diphenhydrAMINE, diphenhydrAMINE, HYDROmorphone, iohexol, loperamide, naloxone, ondansetron, oxyCODONE, oxyCODONE-acetaminophen, promethazine (PHENERGAN) IVPB, zolpidem    Allergies:   Review of patient's allergies indicates:   Allergen Reactions    Azathioprine sodium Other (See Comments)     Other reaction(s): pancreatitis  Other  reaction(s): pancreatitis    Methotrexate analogues Other (See Comments)     leukopenia    Stelara [ustekinumab] Other (See Comments)     Multiple infections    Zofran [ondansetron hcl (pf)] Other (See Comments)     Per patient causes prolong QT    Vancomycin analogues Hives    Morphine Itching and Other (See Comments)     Other reaction(s): Itching    Bactrim [sulfamethoxazole-trimethoprim] Palpitations    Ciprofloxacin Palpitations       Labs:  No results for input(s): INR in the last 168 hours.    Invalid input(s):  PT,  PTT    Recent Labs   Lab 06/02/19 0313   WBC 14.55*  14.55*   HGB 10.4*  10.4*   HCT 32.5*  32.5*   MCV 90  90     296      Recent Labs   Lab 06/02/19 0313   GLU 92      K 3.5      CO2 24   BUN 4*   CREATININE 0.8   CALCIUM 8.9   ALT 14   AST 20   ALBUMIN 2.6*   BILITOT 0.6         Vitals (Most Recent):  Temp: 99.2 °F (37.3 °C) (06/02/19 0958)  Pulse: (!) 115 (06/02/19 0958)  Resp: 17 (06/02/19 0958)  BP: 128/75 (06/02/19 0958)  SpO2: 97 % (06/02/19 0958)    Plan:     Case discussed with OB/GYN resident. The patient started having fever post op. Workup has been negative for a source. They favor abdominopelvic fluid collections to be postoperative change over abscesses. The plan is if the patient doesn't improve today on antibiotic is to attempt IR drainage Monday.     1. NPO after midnight.  2. Hold anticoagulants.  3. Tentatively plan on IR drainage tomorrow.     Josy Griffith MD   PGY-2 Radiology

## 2019-06-02 NOTE — PLAN OF CARE
Problem: Adult Inpatient Plan of Care  Goal: Plan of Care Review  Outcome: Ongoing (interventions implemented as appropriate)  Plan of care reviewed with the patient at the beginning of shift. The patient was afebrile most of the day shift. The patient began to have a fever prior to night shift. Temp continued to elevate and then sustained. Tylenol administered. CT of abd performed. CX from port drawn. Cooling blanket utilized. Pt became tachycardic. EKG ordered. All other vitals stable. Bed in lock locked position. Call bell within reach. Will continue to monitor.

## 2019-06-02 NOTE — HOSPITAL COURSE
5/30/19 Presented for scheduled exlap/BSO/bilateral ovarian cystectomy with Dr. Parsons present for JUAN related to small bowel 2/2 to patient's history of Chron's, total colectomy, and ileostomy. Overall surgery went well, no complications, please see op note for details.  5/31/19. POD#1. Originally patient doing well, pain well controlled on PCA pump. By 11AM patient spiked temp of 103. Clinically patient did not appear toxic, abdomen was soft non distended. She was treated with tylenol and temp came down. She then became febrile again. WBC and LA ordered, both of which were normal. Overnight patient continued to spike temperatures to 102-103 with no resolution after giving tylenol. She was placed on cooling blanket. Blood cultures drawn, started on IV abx linezolid and zosyn. Eventually by morning temperature came down, she was afebrile. LA and WBC remained normal. Throughout this time patient was seen overnight and again in the morning, still clinically looked fair, did not appear toxic or septic. Abdomen remained soft and non distended.   6/1/19. POD#2. Afebrile this AM, continuing on IV abx. Did well throughout the day with po pain medication. Ambulating, voiding, tolerating po without difficulty. Overnight patient became febrile again to 100.9, blood culture ordered from port site. CT abdomen/pelvis with IV and po contrast ordered. Patient's fever resolved by morning. Continue IV abx. Prelim blood cultures negative.   06/02/2019 POD#3 CT abdomen/pelvis concerning for possible abscess. Originally afebrile on rounding this AM however spiked temp to 103. Will consult IR for abscess drainage for source control of infection.   06/03/2019 POD#4 Had fever overnight, resolved with 1g tylenol. Overall fever curve decreasing. IR consulted about draining site. IR placed drain in left buttocks to pelvic fluid collection pocket, initially drained 30cc of fluid.   6/4/19 POD#5 from exlap/BSO, POD#1 s/p IR drain placement.  Fever curve improving. WBC  6. Overall greatly improving. Remained afebrile for 24 hours.   6/5-6/6. Over 48 hours afebrile. IV abx discontinued. Current IR cultures show no growth. Started HRT 2/2 to hot flashes. Diflucan given for yeast infection. Drained pulled on POD#3 s/p drain placement, draining less than 20cc daily.   6/7/19. Remained afebrile. Pain well controlled. Pathology benign. Stable for discharge home.

## 2019-06-02 NOTE — NURSING
Pt has an elevated temperature. On call paged. Tylenol ordered. CT ordered. Blood CX from port ordered.

## 2019-06-02 NOTE — PROGRESS NOTES
[facility]  Gynecologic Oncology  Progress Note      Patient Name: Ivy Salgado  MRN: [id number]  Admission Date: 5/30/2019  Hospital Length of Stay: 3 days  Attending Provider: Rupa German MD  Primary Care Provider: Kalia Astorga MD  Principal Problem: <principal problem not specified>    Follow-up For: Procedure(s) (LRB):  SALPINGO-OOPHORECTOMY, BILATERAL (Bilateral)  LAPAROTOMY, EXPLORATORY (N/A)  LYSIS, ADHESIONS (N/A)  Post-Operative Day: 3 Days Post-Op  Subjective:        Interval History:   Did well throughout the day yesterday. Febrile overnight again. Tachycardic to 155 overnight, EKG showed NSR. Received po tylenol with resolution in fever and tachycardia. Did have CT abdomen and pelvis overnight. Pain well controlled, worst with ambulating. Tolerated regular diet of lasagna last night without nausea/vomiting. Denies CP or SOB this AM.     Scheduled Meds:   cephALEXin  250 mg Oral QHS    dronabinol  5 mg Oral BID WM    famotidine  20 mg Oral BID    linezolid 600mg/300ml  600 mg Intravenous Q12H    mirtazapine  30 mg Oral QHS    mupirocin  1 g Nasal BID    piperacillin-tazobactam (ZOSYN) IVPB  4.5 g Intravenous Q8H    valACYclovir  500 mg Oral Daily     Continuous Infusions:   lactated ringers 125 mL/hr at 06/01/19 2023     PRN Meds:calcium carbonate, clonazePAM, diphenhydrAMINE, diphenhydrAMINE, HYDROmorphone, iohexol, loperamide, naloxone, ondansetron, oxyCODONE, oxyCODONE-acetaminophen, promethazine (PHENERGAN) IVPB, zolpidem    Review of patient's allergies indicates:   Allergen Reactions    Azathioprine sodium Other (See Comments)     Other reaction(s): pancreatitis  Other reaction(s): pancreatitis    Methotrexate analogues Other (See Comments)     leukopenia    Stelara [ustekinumab] Other (See Comments)     Multiple infections    Zofran [ondansetron hcl (pf)] Other (See Comments)     Per patient causes prolong QT    Vancomycin analogues Hives    Morphine  Itching and Other (See Comments)     Other reaction(s): Itching    Bactrim [sulfamethoxazole-trimethoprim] Palpitations    Ciprofloxacin Palpitations       Objective:     Vital Signs (Most Recent):  Temp: 99.6 °F (37.6 °C) (06/02/19 0304)  Pulse: 107 (06/02/19 0333)  Resp: 16 (06/02/19 0304)  BP: 113/70 (06/02/19 0304)  SpO2: 98 % (06/02/19 0304) Vital Signs (24h Range):  Temp:  [97.9 °F (36.6 °C)-101.6 °F (38.7 °C)] 99.6 °F (37.6 °C)  Pulse:  [] 107  Resp:  [16-20] 16  SpO2:  [94 %-99 %] 98 %  BP: (107-148)/(68-84) 113/70     Weight: 52.6 kg (116 lb)  Body mass index is 21.92 kg/m².    Intake/Output - Last 3 Shifts       05/31 0700 - 06/01 0659 06/01 0700 - 06/02 0659 06/02 0700 - 06/03 0659    P.O. 360      I.V. (mL/kg) 1586.9 (30.2)      Total Intake(mL/kg) 1946.9 (37)      Urine (mL/kg/hr) 1000 (0.8) 1250 (1)     Stool 150 75     Blood       Total Output 1150 1325     Net +796.9 -1325                     Physical Exam:   Constitutional: She is oriented to person, place, and time. She appears well-developed and well-nourished. No distress.   Resting comfortably in bed       HENT:   Head: Normocephalic and atraumatic.    Eyes: Conjunctivae and EOM are normal.    Neck: Normal range of motion. Neck supple. No thyromegaly present.    Cardiovascular: Normal rate, regular rhythm and normal heart sounds.     Pulmonary/Chest: Effort normal and breath sounds normal. No respiratory distress.        Abdominal: Soft. She exhibits abdominal incision (c/d/i). She exhibits no distension. There is no tenderness.   Abdomen soft, non distended. Appropriate TTP    Ostomy site c/d/i with good output              Musculoskeletal: Normal range of motion and moves all extremeties. She exhibits no edema or tenderness.       Neurological: She is alert and oriented to person, place, and time.    Skin: Skin is warm and dry. No rash noted.    Psychiatric: She has a normal mood and affect. Her behavior is normal.        Lines/Drains/Airways     Central Venous Catheter Line                 Port A Cath Single Lumen 02/01/17 0800 left subclavian 850 days          Drain                 Ileostomy RLQ -- days         Ileostomy 06/16/12 0000 RLQ 2542 days                Laboratory:  BMP:   Recent Labs   Lab 06/01/19  0428 06/02/19  0313   * 92   * 137   K 3.3* 3.5    104   CO2 26 24   BUN 4* 4*   CREATININE 0.7 0.8   CALCIUM 8.7 8.9   , CBC:   Recent Labs   Lab 05/31/19  1245 06/01/19  0428 06/02/19  0313   WBC 11.92 16.21* 14.55*  14.55*   HGB 11.0* 10.7* 10.4*  10.4*   HCT 33.6* 32.2* 32.5*  32.5*    260 296  296    and Urine Studies:   Recent Labs   Lab 05/31/19  1322   COLORU Straw   APPEARANCEUA Clear   PHUR 8.0   SPECGRAV 1.005   PROTEINUA Negative   GLUCUA Negative   KETONESU Negative   BILIRUBINUA Negative   OCCULTUA 1+*   NITRITE Negative   LEUKOCYTESUR Negative   RBCUA 1   WBCUA 1   SQUAMEPITHEL 1       Diagnostic Results:  CT Abdomen Pelvis:   EXAMINATION:  CT ABDOMEN PELVIS WITH CONTRAST    CLINICAL HISTORY:  post operative fever of unknown origin. Rule out abscess or bowel injury. Do not have high clinical suspicion based off of physical exam;    TECHNIQUE:  The patient was surveyed from the lung bases through the pelvis after the administration of 75 cc Omni 350 IV contrast as well as 30 cc Omni 350 oral contrast. The data was reconstructed for coronal, sagittal, and axial images.    COMPARISON:  MRI 05/15/2019; CT 12/06/2018; ultrasound 04/09/2019.    FINDINGS:  There are bandlike opacities within the right lower lobe consistent with atelectasis..  No pleural effusion is seen.    Central venous catheter tip partially visualized at the cavoatrial junction.  The visualized portions of the heart appear normal. No pericardial effusion.    The liver is normal in size and attenuation.  No focal hepatic abnormality is seen. The gallbladder is unremarkable.  No intrahepatic or extrahepatic  biliary ductal dilatation is identified. The spleen is unremarkable.    The stomach, pancreas, and adrenal glands are unremarkable.    The kidneys are normal in size and location. There is a stable right renal cyst and bilateral subcentimeter hypodensities, too small to characterize but likely additional cysts.  Bilateral renal calculi are present measuring up to 0.3 cm on the right and 0.5 cm on the left.  There is mild left hydroureteronephrosis.  The right ureter appears normal in course and caliber. The urinary bladder is unremarkable. The uterus is surgically absent.  There has been interval postsurgical changes of bilateral salpingo-oophorectomy.    There are postsurgical changes of colectomy with right lower quadrant end ileostomy.  No bowel obstruction.  In this patient with a history of Crohn's disease, evaluation for active bowel inflammation is limited by the presence of radiopaque oral contrast.    There is scattered foci of intraperitoneal free air, likely related to recent surgery.  Small volume free fluid is present throughout the abdomen and pelvis.  There is a large, peripherally enhancing fluid collection within the pelvis measuring 9.6 x 6.4 cm containing small foci of free air (axial image 65).  Additional smaller fluid collection is noted posteriorly on the left in the deep pelvis (axial image 66).  Additional collection present in the mid abdomen demonstrating mild rim enhancement measuring 4.1 x 3.3 cm (axial image 47).  Additional interloop fluid collections identified adjacent to a segment of small bowel in the left hemiabdomen (axial images 41 and 47).  No enteric contrast extravasation to suggest bowel injury.    There is no evidence of lymph node enlargement in the abdomen or pelvis.  The abdominal aorta is normal in course and caliber without significant atherosclerotic calcifications.    The osseous structures demonstrate no significant abnormality.  The extraperitoneal soft tissues  demonstrate a healing midline abdominal incision.      Impression       1. In this patient with a history of recent bilateral salpingo-oophorectomy, there are nonspecific rim enhancing fluid collections in the abdomen and pelvis as discussed above, worrisome for abscesses or other postoperative fluid collections.  2. Scattered foci of intraperitoneal free air and small volume abdominal and pelvic free fluid, likely postsurgical in etiology.  3. Mild left hydroureteronephrosis, not seen on prior studies.  Etiology of this finding is not definitively identified but could be related to pelvic inflammation.  Bilateral nonobstructing nephrolithiasis appear stable.  4. Postsurgical changes of hysterectomy and colectomy with right lower quadrant ileostomy.  5. Multiple additional findings as above.  This report was flagged in Epic as abnormal.    Electronically signed by resident: Mono Angelo  Date: 06/02/2019  Time: 00:23    Electronically signed by: Gerardo Chambers MD  Date: 06/02/2019  Time: 04:11            Assessment/Plan:     POD#2  s/p ExLap/BSO/JUAN  - Doing fair  - Tolerating po without n/v  - good ostomy output   - ambulating and voiding without difficulty  - post op H/H stable 10/34  - transitioned to po pain medication with IV dilaudid for breakthrough doing well. Patient requests to not change IV dilaudid to po today  - IV fluid discontinued yesterday, however patient requested them to stay on. OK to stay on, running at 75cc/hr.  - SCDs for DVT ppx.     Post operative Fever:  - John temp overnight to 101.7  - blood cultures from port site obtained  - Tachy to 155, EKG showed NSR  - given po tylenol and placed on cooling blanket. Fever improved as well as tachycardia  - CT abdomen pelvis ordered overnight, concerning for 9x6 fluid collection, possible abscess.   - patient clinically appears non septic or toxic appearing  - BP stable, not hypotensive   - prelim blood cultures negative  - urine cultures  negative   - did have 100.6 temp on preop visit prior to surgery, possible underlying etiology   - LA 1.1>0.9  - WBC 11>16>14  - continue abx zosyn and linezolid (patient allergic to vancomycin), plan to keep on for at least 48 hours afebrile  - now with diagnosed fluid collection concerning for abscess as etiology of fever, consider IR drainage. Clinically improving on abx.   - patient made NPO, will plan to consult IR.         Crohn's  - S/P total colectomy  - Avoid NSAIDs  - Monitor for ileostomy output         Depression/Anxiety  - Continue home clonaxepam   - Continue remeron  - continue home ambiens         HTN  - No meds  - Monitor BP closely        GERD  - Continue zantac BID      HSV  - continue home valtrex   VTE Risk Mitigation (From admission, onward)        Ordered     Place sequential compression device  Until discontinued      05/30/19 0545     Place JEANNA hose  Until discontinued      05/30/19 0545          Beverly Fishman MD  Gynecologic Oncology  [facility]

## 2019-06-03 ENCOUNTER — PATIENT MESSAGE (OUTPATIENT)
Dept: DERMATOLOGY | Facility: CLINIC | Age: 39
End: 2019-06-03

## 2019-06-03 DIAGNOSIS — D03.59 MELANOMA IN SITU OF TORSO EXCLUDING BREAST: Primary | ICD-10-CM

## 2019-06-03 LAB
APPEARANCE FLD: NORMAL
BASOPHILS # BLD AUTO: 0.02 K/UL (ref 0–0.2)
BASOPHILS NFR BLD: 0.3 % (ref 0–1.9)
BODY FLD TYPE: NORMAL
COLOR FLD: NORMAL
DIFFERENTIAL METHOD: ABNORMAL
EOSINOPHIL # BLD AUTO: 0.4 K/UL (ref 0–0.5)
EOSINOPHIL NFR BLD: 5.2 % (ref 0–8)
ERYTHROCYTE [DISTWIDTH] IN BLOOD BY AUTOMATED COUNT: 14.6 % (ref 11.5–14.5)
GRAM STN SPEC: NORMAL
GRAM STN SPEC: NORMAL
HCT VFR BLD AUTO: 30 % (ref 37–48.5)
HGB BLD-MCNC: 9.4 G/DL (ref 12–16)
IMM GRANULOCYTES # BLD AUTO: 0.02 K/UL (ref 0–0.04)
IMM GRANULOCYTES NFR BLD AUTO: 0.3 % (ref 0–0.5)
LYMPHOCYTES # BLD AUTO: 2 K/UL (ref 1–4.8)
LYMPHOCYTES NFR BLD: 28.8 % (ref 18–48)
LYMPHOCYTES NFR FLD MANUAL: 1 %
MCH RBC QN AUTO: 28.8 PG (ref 27–31)
MCHC RBC AUTO-ENTMCNC: 31.3 G/DL (ref 32–36)
MCV RBC AUTO: 92 FL (ref 82–98)
MONOCYTES # BLD AUTO: 0.6 K/UL (ref 0.3–1)
MONOCYTES NFR BLD: 7.8 % (ref 4–15)
NEUTROPHILS # BLD AUTO: 4.1 K/UL (ref 1.8–7.7)
NEUTROPHILS NFR BLD: 57.6 % (ref 38–73)
NEUTROPHILS NFR FLD MANUAL: 99 %
NRBC BLD-RTO: 0 /100 WBC
PLATELET # BLD AUTO: 293 K/UL (ref 150–350)
PMV BLD AUTO: 10 FL (ref 9.2–12.9)
RBC # BLD AUTO: 3.26 M/UL (ref 4–5.4)
WBC # BLD AUTO: 7.05 K/UL (ref 3.9–12.7)
WBC # FLD: NORMAL /CU MM

## 2019-06-03 PROCEDURE — 99024 POSTOP FOLLOW-UP VISIT: CPT | Mod: ,,, | Performed by: OBSTETRICS & GYNECOLOGY

## 2019-06-03 PROCEDURE — 20600001 HC STEP DOWN PRIVATE ROOM

## 2019-06-03 PROCEDURE — 99024 PR POST-OP FOLLOW-UP VISIT: ICD-10-PCS | Mod: ,,, | Performed by: OBSTETRICS & GYNECOLOGY

## 2019-06-03 PROCEDURE — 25000003 PHARM REV CODE 250: Performed by: OBSTETRICS & GYNECOLOGY

## 2019-06-03 PROCEDURE — 87206 SMEAR FLUORESCENT/ACID STAI: CPT

## 2019-06-03 PROCEDURE — 87116 MYCOBACTERIA CULTURE: CPT

## 2019-06-03 PROCEDURE — 87075 CULTR BACTERIA EXCEPT BLOOD: CPT

## 2019-06-03 PROCEDURE — 85025 COMPLETE CBC W/AUTO DIFF WBC: CPT

## 2019-06-03 PROCEDURE — 25000003 PHARM REV CODE 250: Performed by: STUDENT IN AN ORGANIZED HEALTH CARE EDUCATION/TRAINING PROGRAM

## 2019-06-03 PROCEDURE — 89051 BODY FLUID CELL COUNT: CPT

## 2019-06-03 PROCEDURE — 63600175 PHARM REV CODE 636 W HCPCS: Performed by: STUDENT IN AN ORGANIZED HEALTH CARE EDUCATION/TRAINING PROGRAM

## 2019-06-03 PROCEDURE — 87205 SMEAR GRAM STAIN: CPT

## 2019-06-03 PROCEDURE — 87102 FUNGUS ISOLATION CULTURE: CPT

## 2019-06-03 PROCEDURE — 87070 CULTURE OTHR SPECIMN AEROBIC: CPT

## 2019-06-03 PROCEDURE — G0378 HOSPITAL OBSERVATION PER HR: HCPCS

## 2019-06-03 PROCEDURE — 36415 COLL VENOUS BLD VENIPUNCTURE: CPT

## 2019-06-03 RX ORDER — KETAMINE HYDROCHLORIDE 10 MG/ML
INJECTION, SOLUTION INTRAMUSCULAR; INTRAVENOUS
Status: DISCONTINUED | OUTPATIENT
Start: 2019-05-30 | End: 2019-06-03

## 2019-06-03 RX ORDER — FENTANYL CITRATE 50 UG/ML
INJECTION, SOLUTION INTRAMUSCULAR; INTRAVENOUS CODE/TRAUMA/SEDATION MEDICATION
Status: COMPLETED | OUTPATIENT
Start: 2019-06-03 | End: 2019-06-03

## 2019-06-03 RX ORDER — MIDAZOLAM HYDROCHLORIDE 1 MG/ML
INJECTION INTRAMUSCULAR; INTRAVENOUS CODE/TRAUMA/SEDATION MEDICATION
Status: COMPLETED | OUTPATIENT
Start: 2019-06-03 | End: 2019-06-03

## 2019-06-03 RX ADMIN — CLONAZEPAM 1 MG: 1 TABLET ORAL at 12:06

## 2019-06-03 RX ADMIN — FAMOTIDINE 20 MG: 20 TABLET, FILM COATED ORAL at 08:06

## 2019-06-03 RX ADMIN — MIDAZOLAM HYDROCHLORIDE 0.5 MG: 1 INJECTION, SOLUTION INTRAMUSCULAR; INTRAVENOUS at 03:06

## 2019-06-03 RX ADMIN — OXYCODONE AND ACETAMINOPHEN 1 TABLET: 10; 325 TABLET ORAL at 06:06

## 2019-06-03 RX ADMIN — CLONAZEPAM 1 MG: 1 TABLET ORAL at 10:06

## 2019-06-03 RX ADMIN — PIPERACILLIN AND TAZOBACTAM 4.5 G: 4; .5 INJECTION, POWDER, LYOPHILIZED, FOR SOLUTION INTRAVENOUS; PARENTERAL at 04:06

## 2019-06-03 RX ADMIN — PIPERACILLIN AND TAZOBACTAM 4.5 G: 4; .5 INJECTION, POWDER, LYOPHILIZED, FOR SOLUTION INTRAVENOUS; PARENTERAL at 01:06

## 2019-06-03 RX ADMIN — OXYCODONE AND ACETAMINOPHEN 1 TABLET: 10; 325 TABLET ORAL at 11:06

## 2019-06-03 RX ADMIN — MIDAZOLAM HYDROCHLORIDE 1 MG: 1 INJECTION, SOLUTION INTRAMUSCULAR; INTRAVENOUS at 02:06

## 2019-06-03 RX ADMIN — SODIUM CHLORIDE, SODIUM LACTATE, POTASSIUM CHLORIDE, AND CALCIUM CHLORIDE 125 ML/HR: .6; .31; .03; .02 INJECTION, SOLUTION INTRAVENOUS at 08:06

## 2019-06-03 RX ADMIN — FAMOTIDINE 20 MG: 20 TABLET, FILM COATED ORAL at 12:06

## 2019-06-03 RX ADMIN — HYDROMORPHONE HYDROCHLORIDE 2 MG: 1 INJECTION, SOLUTION INTRAMUSCULAR; INTRAVENOUS; SUBCUTANEOUS at 11:06

## 2019-06-03 RX ADMIN — MIRTAZAPINE 30 MG: 15 TABLET, ORALLY DISINTEGRATING ORAL at 08:06

## 2019-06-03 RX ADMIN — LINEZOLID 600 MG: 600 INJECTION, SOLUTION INTRAVENOUS at 11:06

## 2019-06-03 RX ADMIN — OXYCODONE AND ACETAMINOPHEN 1 TABLET: 10; 325 TABLET ORAL at 08:06

## 2019-06-03 RX ADMIN — CEPHALEXIN 250 MG: 250 CAPSULE ORAL at 08:06

## 2019-06-03 RX ADMIN — ZOLPIDEM TARTRATE 10 MG: 5 TABLET ORAL at 11:06

## 2019-06-03 RX ADMIN — MUPIROCIN 1 G: 20 OINTMENT TOPICAL at 08:06

## 2019-06-03 RX ADMIN — PIPERACILLIN AND TAZOBACTAM 4.5 G: 4; .5 INJECTION, POWDER, LYOPHILIZED, FOR SOLUTION INTRAVENOUS; PARENTERAL at 08:06

## 2019-06-03 RX ADMIN — FENTANYL CITRATE 25 MCG: 50 INJECTION, SOLUTION INTRAMUSCULAR; INTRAVENOUS at 03:06

## 2019-06-03 RX ADMIN — FENTANYL CITRATE 50 MCG: 50 INJECTION, SOLUTION INTRAMUSCULAR; INTRAVENOUS at 02:06

## 2019-06-03 RX ADMIN — VALACYCLOVIR HYDROCHLORIDE 500 MG: 500 TABLET, FILM COATED ORAL at 08:06

## 2019-06-03 RX ADMIN — FENTANYL CITRATE 25 MCG: 50 INJECTION, SOLUTION INTRAMUSCULAR; INTRAVENOUS at 02:06

## 2019-06-03 RX ADMIN — HYDROMORPHONE HYDROCHLORIDE 2 MG: 1 INJECTION, SOLUTION INTRAMUSCULAR; INTRAVENOUS; SUBCUTANEOUS at 08:06

## 2019-06-03 RX ADMIN — OXYCODONE AND ACETAMINOPHEN 1 TABLET: 10; 325 TABLET ORAL at 03:06

## 2019-06-03 RX ADMIN — LINEZOLID 600 MG: 600 INJECTION, SOLUTION INTRAVENOUS at 12:06

## 2019-06-03 RX ADMIN — HYDROMORPHONE HYDROCHLORIDE 2 MG: 1 INJECTION, SOLUTION INTRAMUSCULAR; INTRAVENOUS; SUBCUTANEOUS at 04:06

## 2019-06-03 NOTE — SUBJECTIVE & OBJECTIVE
Interval History:   Did well throughout the day yesterday. Febrile overnight again. Tolerating regular diet without nausea/vomiting. Ambulating and voiding without difficulty. Pain better controlled today.     Scheduled Meds:   cephALEXin  250 mg Oral QHS    dronabinol  5 mg Oral BID WM    famotidine  20 mg Oral BID    linezolid 600mg/300ml  600 mg Intravenous Q12H    mirtazapine  30 mg Oral QHS    mupirocin  1 g Nasal BID    piperacillin-tazobactam (ZOSYN) IVPB  4.5 g Intravenous Q8H    valACYclovir  500 mg Oral Daily     Continuous Infusions:   lactated ringers 125 mL/hr (06/02/19 1617)     PRN Meds:calcium carbonate, clonazePAM, diphenhydrAMINE, diphenhydrAMINE, HYDROmorphone, iohexol, loperamide, naloxone, ondansetron, oxyCODONE-acetaminophen, oxyCODONE-acetaminophen, promethazine (PHENERGAN) IVPB, zolpidem    Review of patient's allergies indicates:   Allergen Reactions    Azathioprine sodium Other (See Comments)     Other reaction(s): pancreatitis  Other reaction(s): pancreatitis    Methotrexate analogues Other (See Comments)     leukopenia    Stelara [ustekinumab] Other (See Comments)     Multiple infections    Zofran [ondansetron hcl (pf)] Other (See Comments)     Per patient causes prolong QT    Vancomycin analogues Hives    Morphine Itching and Other (See Comments)     Other reaction(s): Itching    Bactrim [sulfamethoxazole-trimethoprim] Palpitations    Ciprofloxacin Palpitations       Objective:     Vital Signs (Most Recent):  Temp: 98.6 °F (37 °C) (06/03/19 0357)  Pulse: (!) 115 (06/03/19 0357)  Resp: 16 (06/03/19 0357)  BP: (!) 113/90 (06/03/19 0357)  SpO2: 96 % (06/03/19 0357) Vital Signs (24h Range):  Temp:  [98.6 °F (37 °C)-103 °F (39.4 °C)] 98.6 °F (37 °C)  Pulse:  [] 115  Resp:  [16-18] 16  SpO2:  [96 %-98 %] 96 %  BP: (113-130)/(75-92) 113/90     Weight: 52.6 kg (116 lb)  Body mass index is 21.92 kg/m².    Intake/Output - Last 3 Shifts       06/01 0700 - 06/02 0659 06/02  0700 - 06/03 0659    P.O.  600    Total Intake(mL/kg)  600 (11.4)    Urine (mL/kg/hr) 1250 (1) 700 (0.6)    Stool 75 150    Total Output 1325 850    Net -1325 -250                   Physical Exam:   Constitutional: She is oriented to person, place, and time. She appears well-developed and well-nourished. No distress.   Resting comfortably in bed       HENT:   Head: Normocephalic and atraumatic.    Eyes: Conjunctivae and EOM are normal.    Neck: Normal range of motion. Neck supple. No thyromegaly present.    Cardiovascular: Normal rate, regular rhythm and normal heart sounds.     Pulmonary/Chest: Effort normal and breath sounds normal. No respiratory distress.        Abdominal: Soft. She exhibits abdominal incision (c/d/i). She exhibits no distension. There is no tenderness.   Abdomen soft, non distended. Appropriate TTP    Ostomy site c/d/i with good output              Musculoskeletal: Normal range of motion and moves all extremeties. She exhibits no edema or tenderness.       Neurological: She is alert and oriented to person, place, and time.    Skin: Skin is warm and dry. No rash noted.    Psychiatric: She has a normal mood and affect. Her behavior is normal.       Lines/Drains/Airways     Central Venous Catheter Line                 Port A Cath Single Lumen 02/01/17 0800 left subclavian 851 days          Drain                 Ileostomy RLQ -- days         Ileostomy 06/16/12 0000 RLQ 2543 days                Laboratory:  BMP:   Recent Labs   Lab 06/02/19  0313   GLU 92      K 3.5      CO2 24   BUN 4*   CREATININE 0.8   CALCIUM 8.9   , CBC:   Recent Labs   Lab 06/02/19  0313   WBC 14.55*  14.55*   HGB 10.4*  10.4*   HCT 32.5*  32.5*     296    and Urine Studies:   No results for input(s): COLORU, APPEARANCEUA, PHUR, SPECGRAV, PROTEINUA, GLUCUA, KETONESU, BILIRUBINUA, OCCULTUA, NITRITE, UROBILINOGEN, LEUKOCYTESUR, RBCUA, WBCUA, BACTERIA, SQUAMEPITHEL, HYALINECASTS in the last 48  hours.    Invalid input(s): CARISSA    Diagnostic Results:  CT Abdomen Pelvis:   EXAMINATION:  CT ABDOMEN PELVIS WITH CONTRAST    CLINICAL HISTORY:  post operative fever of unknown origin. Rule out abscess or bowel injury. Do not have high clinical suspicion based off of physical exam;    TECHNIQUE:  The patient was surveyed from the lung bases through the pelvis after the administration of 75 cc Omni 350 IV contrast as well as 30 cc Omni 350 oral contrast. The data was reconstructed for coronal, sagittal, and axial images.    COMPARISON:  MRI 05/15/2019; CT 12/06/2018; ultrasound 04/09/2019.    FINDINGS:  There are bandlike opacities within the right lower lobe consistent with atelectasis..  No pleural effusion is seen.    Central venous catheter tip partially visualized at the cavoatrial junction.  The visualized portions of the heart appear normal. No pericardial effusion.    The liver is normal in size and attenuation.  No focal hepatic abnormality is seen. The gallbladder is unremarkable.  No intrahepatic or extrahepatic biliary ductal dilatation is identified. The spleen is unremarkable.    The stomach, pancreas, and adrenal glands are unremarkable.    The kidneys are normal in size and location. There is a stable right renal cyst and bilateral subcentimeter hypodensities, too small to characterize but likely additional cysts.  Bilateral renal calculi are present measuring up to 0.3 cm on the right and 0.5 cm on the left.  There is mild left hydroureteronephrosis.  The right ureter appears normal in course and caliber. The urinary bladder is unremarkable. The uterus is surgically absent.  There has been interval postsurgical changes of bilateral salpingo-oophorectomy.    There are postsurgical changes of colectomy with right lower quadrant end ileostomy.  No bowel obstruction.  In this patient with a history of Crohn's disease, evaluation for active bowel inflammation is limited by the presence of radiopaque  oral contrast.    There is scattered foci of intraperitoneal free air, likely related to recent surgery.  Small volume free fluid is present throughout the abdomen and pelvis.  There is a large, peripherally enhancing fluid collection within the pelvis measuring 9.6 x 6.4 cm containing small foci of free air (axial image 65).  Additional smaller fluid collection is noted posteriorly on the left in the deep pelvis (axial image 66).  Additional collection present in the mid abdomen demonstrating mild rim enhancement measuring 4.1 x 3.3 cm (axial image 47).  Additional interloop fluid collections identified adjacent to a segment of small bowel in the left hemiabdomen (axial images 41 and 47).  No enteric contrast extravasation to suggest bowel injury.    There is no evidence of lymph node enlargement in the abdomen or pelvis.  The abdominal aorta is normal in course and caliber without significant atherosclerotic calcifications.    The osseous structures demonstrate no significant abnormality.  The extraperitoneal soft tissues demonstrate a healing midline abdominal incision.      Impression       1. In this patient with a history of recent bilateral salpingo-oophorectomy, there are nonspecific rim enhancing fluid collections in the abdomen and pelvis as discussed above, worrisome for abscesses or other postoperative fluid collections.  2. Scattered foci of intraperitoneal free air and small volume abdominal and pelvic free fluid, likely postsurgical in etiology.  3. Mild left hydroureteronephrosis, not seen on prior studies.  Etiology of this finding is not definitively identified but could be related to pelvic inflammation.  Bilateral nonobstructing nephrolithiasis appear stable.  4. Postsurgical changes of hysterectomy and colectomy with right lower quadrant ileostomy.  5. Multiple additional findings as above.  This report was flagged in Epic as abnormal.    Electronically signed by resident: Mono Angelo  Date:  06/02/2019  Time: 00:23    Electronically signed by: Gerardo Chambers MD  Date: 06/02/2019  Time: 04:11

## 2019-06-03 NOTE — PLAN OF CARE
Problem: Adult Inpatient Plan of Care  Goal: Plan of Care Review  Outcome: Ongoing (interventions implemented as appropriate)  Plan of care reviewed with the patient at the beginning of shift. The patient was febrile at the beginning of shift. The patient was administered tylenol, since then the patient has been afebrile. Pain is being controlled with IV and PO pain medications. Pt is currently NPO. Denying all other complaints. VSS. Bed in low locked position. Call bell within reach. Will continue to monitor.

## 2019-06-03 NOTE — TELEPHONE ENCOUNTER
Patient was notified of below results via eflow message.    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 The lesion extends to a  peripheral edge and a re-excision to ensure complete removal may be prudent.

## 2019-06-03 NOTE — PROGRESS NOTES
Pt arrived to [place] [id number] via stretcher on RA, pt aao x'4, report received from Shelby MCKEON

## 2019-06-03 NOTE — NURSING
Patient received back from IR procedure reports pain scale of 8 out of 10 . MARI drain placed to right lower back Drain draining bloody drainage . Report received report from PACU . Patient alert oriented. Pain med to be given for comfort .

## 2019-06-03 NOTE — H&P
Inpatient Radiology Pre-procedure Note    History of Present Illness:  Ivy Salgado is a 38 y.o. female with a history of Chron's disease with total colectomy and illiostomy. On 5/31 she underwent BSO. She became febrile and CT abdomen was done showing multiple fluid collections in the abdomen and pelvis. IR was consulted for drainage/Aspiration.     Admission H&P reviewed.  Past Medical History:   Diagnosis Date    Abnormal Pap smear 2007    Abnormal Pap smear 5/26/2011    Anemia     Anxiety     Arthritis     C. difficile diarrhea     Crohn's disease     Depression 8/5/2017    Encounter for blood transfusion     Genital HSV     History of colposcopy with cervical biopsy 2007 and 7/2011 2007-LYLA I  and 7/2011- LYLA I    Hypertension     Kidney stone     Kidney stone     Recurrent UTI 4/3/2013    S/P ileostomy 7/9/2012    Sterilization 6/23/2012     Past Surgical History:   Procedure Laterality Date    ABDOMINAL SURGERY      APPENDECTOMY      BLADDER SURGERY      partial cystectomy due to fistula    CKC      COLON SURGERY      COLONOSCOPY      EGD (ESOPHAGOGASTRODUODENOSCOPY) N/A 9/6/2013    Performed by Emilio Cameron MD at [facility] ENDO (4TH FLR)    ESOPHAGOGASTRODUODENOSCOPY (EGD) N/A 10/14/2016    Performed by Janelle Mcpherson MD at [facility] ENDO (2ND FLR)    ESOPHAGOGASTRODUODENOSCOPY (EGD) N/A 10/23/2014    Performed by Nathanael Mitchell MD at [facility] ENDO (2ND FLR)    EXAM UNDER ANESTHESIA N/A 8/29/2013    Performed by Bob Parsons MD at [facility] OR 2ND FLR    EXAM UNDER ANESTHESIA N/A 1/18/2013    Performed by Bob Parsons MD at [facility] OR 2ND FLR    HYSTERECTOMY-ABDOMINAL-TOTAL (SHELLIE) N/A 4/16/2015    [facility] OR    ILEOSCOPY N/A 8/8/2017    Performed by Tommie Klein MD at [facility] ENDO (2ND FLR)    ILEOSCOPY N/A 10/14/2016    Performed by Janelle Mcpherson MD at [facility] ENDO (2ND FLR)    ILEOSCOPY N/A 9/25/2013    Performed by Emilio Cameron MD at  [facility] ENDO (4TH FLR)    ILEOSTOMY      INCISION AND DRAINAGE, ABSCESS N/A 8/29/2013    Performed by Bob Parsons MD at [facility] OR 2ND FLR    SJNFBZHRR-QFRJ-B-CATH Left 2/21/2017    Performed by Parish Sanchez Jr., MD at [facility] OR 2ND FLR    LAPAROTOMY, EXPLORATORY N/A 5/30/2019    Performed by Rupa German MD at [facility] OR 2ND FLR    LYSIS, ADHESIONS N/A 5/30/2019    Performed by Rupa German MD at [facility] OR 2ND FLR    OOPHORECTOMY Right 04/16/2015    PORTACATH PLACEMENT  02/21/2017    REPAIR-BLADDER  4/16/2015    Performed by [facility]ix [facility]ales [facility] at [facility] OR    SALPINGO-OOPHERECTOMY Right 4/16/2015    Performed by Alix [facility] at [facility] OR    SALPINGO-OOPHORECTOMY, BILATERAL Bilateral 5/30/2019    Performed by Rupa German MD at [facility] OR 2ND FLR    SIGMOIDOSCOPY, FLEXIBLE N/A 2/7/2014    Performed by Erasto Sewell MD at [facility] ENDO (2ND FLR)    SKIN BIOPSY      SMALL INTESTINE SURGERY      age 18 Y    TOTAL ABDOMINAL HYSTERECTOMY  04/16/2015    TOTAL COLECTOMY      TUBAL LIGATION  06/06/2012    UPPER GASTROINTESTINAL ENDOSCOPY         Review of Systems:   As documented in primary team H&P    Home Meds:   Prior to Admission medications    Medication Sig Start Date End Date Taking? Authorizing Provider   cephALEXin (KEFLEX) 250 MG capsule Take 1 capsule (250 mg total) by mouth once daily. 1/21/19  Yes Phu Obrien MD   clonazePAM (KLONOPIN) 1 MG tablet Take 1 tablet (1 mg total) by mouth 2 (two) times daily as needed. 5/28/19  Yes Kalia Astorga MD   diphenoxylate-atropine 2.5-0.025 mg (LOMOTIL) 2.5-0.025 mg per tablet Take 1 tablet by mouth 4 (four) times daily as needed. for diarrhea 5/28/19  Yes Parish Ross MD   dronabinol (MARINOL) 5 MG capsule Take 1 capsule (5 mg total) by mouth 2 (two) times daily before meals. 5/14/19  Yes Parish Ross MD   food supplemt, lactose-reduced (BOOST BREEZE NUTRITIONAL) 0.04-1.05 gram-kcal/mL Liqd Use 3 times per day  Patient  taking differently: Use 1 time per day 1/22/19  Yes Kalia Astorga MD   mirtazapine (REMERON SOL-TAB) 30 MG disintegrating tablet Take 1 tablet (30 mg total) by mouth every evening. 1/30/19 1/30/20 Yes Kalia Astorga MD   multivitamin (ONE DAILY MULTIVITAMIN) per tablet Take 1 tablet by mouth once daily.   Yes Historical Provider, MD   oxyCODONE-acetaminophen (PERCOCET)  mg per tablet Take 1 tablet by mouth every 6 (six) hours as needed for Pain.  Patient taking differently: Take 0.5-1 tablets by mouth every 6 (six) hours as needed for Pain.  5/10/19  Yes Parish Ross MD   ranitidine (ZANTAC) 150 MG tablet Take 150 mg by mouth 2 (two) times daily as needed for Heartburn.   Yes Historical Provider, MD   valACYclovir (VALTREX) 500 MG tablet TAKE 1 TABLET BY MOUTH EVERY DAY 3/25/19  Yes Alix Morales MD   zolpidem (AMBIEN) 10 mg Tab TAKE 1 TABLET BY MOUTH EVERY EVENING 5/29/19  Yes Kalia Astorga MD   clindamycin (CLINDESSE) 2 % vaginal cream PLACE VAGINALLY EVERY EVENING 4/15/19   Dinora Moser MD   clonazePAM (KLONOPIN) 1 MG tablet TAKE 1 TABLET BY MOUTH TWICE DAILY AS NEEDED FOR ANXIETY 5/29/19   Kalia Astorga MD   ibuprofen (ADVIL,MOTRIN) 400 MG tablet Take 400 mg by mouth daily as needed (pain).    Historical Provider, MD   loperamide (IMODIUM) 2 mg capsule Take 2 mg by mouth daily as needed for Diarrhea.    Historical Provider, MD   potassium citrate (UROCIT-K 15) 15 mEq TbSR Take 2 tablets by mouth 2 (two) times daily. 3/28/19 3/27/20  Phu Obrien MD   promethazine (PHENERGAN) 25 MG tablet Take 1 tablet (25 mg total) by mouth every 6 (six) hours as needed. 5/15/18   Parish Ross MD   terconazole (TERAZOL 7) 0.4 % Crea INSERT ONE APPLICATORFUL VAGINALLY AT BEDTIME 1/23/19   Alix Morales MD     Scheduled Meds:    cephALEXin  250 mg Oral QHS    dronabinol  5 mg Oral BID WM    famotidine  20 mg Oral BID    linezolid 600mg/300ml  600 mg Intravenous Q12H    mirtazapine   30 mg Oral QHS    mupirocin  1 g Nasal BID    piperacillin-tazobactam (ZOSYN) IVPB  4.5 g Intravenous Q8H    valACYclovir  500 mg Oral Daily     Continuous Infusions:    lactated ringers 125 mL/hr (06/02/19 1617)     PRN Meds:calcium carbonate, clonazePAM, diphenhydrAMINE, diphenhydrAMINE, HYDROmorphone, iohexol, loperamide, naloxone, ondansetron, oxyCODONE-acetaminophen, oxyCODONE-acetaminophen, promethazine (PHENERGAN) IVPB, zolpidem  Anticoagulants/Antiplatelets: no anticoagulation    Allergies:   Review of patient's allergies indicates:   Allergen Reactions    Azathioprine sodium Other (See Comments)     Other reaction(s): pancreatitis  Other reaction(s): pancreatitis    Methotrexate analogues Other (See Comments)     leukopenia    Stelara [ustekinumab] Other (See Comments)     Multiple infections    Zofran [ondansetron hcl (pf)] Other (See Comments)     Per patient causes prolong QT    Vancomycin analogues Hives    Morphine Itching and Other (See Comments)     Other reaction(s): Itching    Bactrim [sulfamethoxazole-trimethoprim] Palpitations    Ciprofloxacin Palpitations     Sedation Hx: have not been any systemic reactions    Labs:  No results for input(s): INR in the last 168 hours.    Invalid input(s):  PT,  PTT    Recent Labs   Lab 06/03/19  0430   WBC 7.05   HGB 9.4*   HCT 30.0*   MCV 92         Recent Labs   Lab 06/02/19  0313   GLU 92      K 3.5      CO2 24   BUN 4*   CREATININE 0.8   CALCIUM 8.9   ALT 14   AST 20   ALBUMIN 2.6*   BILITOT 0.6         Vitals:  Temp: 98.7 °F (37.1 °C) (06/03/19 0732)  Pulse: 80 (06/03/19 0743)  Resp: 16 (06/03/19 0732)  BP: 125/80 (06/03/19 0732)  SpO2: 95 % (06/03/19 0732)     Physical Exam:  ASA: 2  Mallampati: 2    General: no acute distress  Mental Status: alert and oriented to person, place and time  HEENT: normocephalic, atraumatic  Chest: unlabored breathing  Heart: regular heart rate  Abdomen: nondistended  Extremity: moves all  extremities    Plan:   - Image guided pelvic fluid collection aspiration/drainage.     Sedation Plan: moderate sedation.     Josy Griffith MD   PGY-2 Radiology

## 2019-06-03 NOTE — PROGRESS NOTES
[facility]  Gynecologic Oncology  Progress Note      Patient Name: Ivy Salgado  MRN: [id number]  Admission Date: 5/30/2019  Hospital Length of Stay: 4 days  Attending Provider: Rupa German MD  Primary Care Provider: Kalai Astorga MD  Principal Problem: <principal problem not specified>    Follow-up For: Procedure(s) (LRB):  SALPINGO-OOPHORECTOMY, BILATERAL (Bilateral)  LAPAROTOMY, EXPLORATORY (N/A)  LYSIS, ADHESIONS (N/A)  Post-Operative Day: 4 Days Post-Op  Subjective:      Hospital Course:  5/30/19 Presented for scheduled exlap/BSO/bilateral ovarian cystectomy with Dr. Parsons present for JUAN related to small bowel 2/2 to patient's history of Chron's, total colectomy, and ileostomy. Overall surgery went well, no complications, please see op note for details.  5/31/19. POD#1. Originally patient doing well, pain well controlled on PCA pump. By 11AM patient spiked temp of 103. Clinically patient did not appear toxic, abdomen was soft non distended. She was treated with tylenol and temp came down. She then became febrile again. WBC and LA ordered, both of which were normal. Overnight patient continued to spike temperatures to 102-103 with no resolution after giving tylenol. She was placed on cooling blanket. Blood cultures drawn, started on IV abx linezolid and zosyn. Eventually by morning temperature came down, she was afebrile. LA and WBC remained normal. Throughout this time patient was seen overnight and again in the morning, still clinically looked fair, did not appear toxic or septic. Abdomen remained soft and non distended.   6/1/19. POD#2. Afebrile this AM, continuing on IV abx. Did well throughout the day with po pain medication. Ambulating, voiding, tolerating po without difficulty. Overnight patient became febrile again to 100.9, blood culture ordered from port site. CT abdomen/pelvis with IV and po contrast ordered. Patient's fever resolved by morning. Continue IV abx.  Prelim blood cultures negative.   06/02/2019 POD#3 CT abdomen/pelvis concerning for possible abscess. Originally afebrile on rounding this AM however spiked temp to 103. Will consult IR for abscess drainage for source control of infection.   06/03/2019 POD#4 Had fever overnight, resolved with 1g tylenol. Overall fever curve decreasing. IR consulted about draining, tentatively scheduled for today. Will discuss with Dr. German.     Interval History:   Did well throughout the day yesterday. Febrile overnight again. Tolerating regular diet without nausea/vomiting. Ambulating and voiding without difficulty. Pain better controlled today.     Scheduled Meds:   cephALEXin  250 mg Oral QHS    dronabinol  5 mg Oral BID WM    famotidine  20 mg Oral BID    linezolid 600mg/300ml  600 mg Intravenous Q12H    mirtazapine  30 mg Oral QHS    mupirocin  1 g Nasal BID    piperacillin-tazobactam (ZOSYN) IVPB  4.5 g Intravenous Q8H    valACYclovir  500 mg Oral Daily     Continuous Infusions:   lactated ringers 125 mL/hr (06/02/19 1617)     PRN Meds:calcium carbonate, clonazePAM, diphenhydrAMINE, diphenhydrAMINE, HYDROmorphone, iohexol, loperamide, naloxone, ondansetron, oxyCODONE-acetaminophen, oxyCODONE-acetaminophen, promethazine (PHENERGAN) IVPB, zolpidem    Review of patient's allergies indicates:   Allergen Reactions    Azathioprine sodium Other (See Comments)     Other reaction(s): pancreatitis  Other reaction(s): pancreatitis    Methotrexate analogues Other (See Comments)     leukopenia    Stelara [ustekinumab] Other (See Comments)     Multiple infections    Zofran [ondansetron hcl (pf)] Other (See Comments)     Per patient causes prolong QT    Vancomycin analogues Hives    Morphine Itching and Other (See Comments)     Other reaction(s): Itching    Bactrim [sulfamethoxazole-trimethoprim] Palpitations    Ciprofloxacin Palpitations       Objective:     Vital Signs (Most Recent):  Temp: 98.6 °F (37 °C) (06/03/19  0357)  Pulse: (!) 115 (06/03/19 0357)  Resp: 16 (06/03/19 0357)  BP: (!) 113/90 (06/03/19 0357)  SpO2: 96 % (06/03/19 0357) Vital Signs (24h Range):  Temp:  [98.6 °F (37 °C)-103 °F (39.4 °C)] 98.6 °F (37 °C)  Pulse:  [] 115  Resp:  [16-18] 16  SpO2:  [96 %-98 %] 96 %  BP: (113-130)/(75-92) 113/90     Weight: 52.6 kg (116 lb)  Body mass index is 21.92 kg/m².    Intake/Output - Last 3 Shifts       06/01 0700 - 06/02 0659 06/02 0700 - 06/03 0659    P.O.  600    Total Intake(mL/kg)  600 (11.4)    Urine (mL/kg/hr) 1250 (1) 700 (0.6)    Stool 75 150    Total Output 1325 850    Net -1325 -250                   Physical Exam:   Constitutional: She is oriented to person, place, and time. She appears well-developed and well-nourished. No distress.   Resting comfortably in bed       HENT:   Head: Normocephalic and atraumatic.    Eyes: Conjunctivae and EOM are normal.    Neck: Normal range of motion. Neck supple. No thyromegaly present.    Cardiovascular: Normal rate, regular rhythm and normal heart sounds.     Pulmonary/Chest: Effort normal and breath sounds normal. No respiratory distress.        Abdominal: Soft. She exhibits abdominal incision (c/d/i). She exhibits no distension. There is no tenderness.   Abdomen soft, non distended. Appropriate TTP    Ostomy site c/d/i with good output              Musculoskeletal: Normal range of motion and moves all extremeties. She exhibits no edema or tenderness.       Neurological: She is alert and oriented to person, place, and time.    Skin: Skin is warm and dry. No rash noted.    Psychiatric: She has a normal mood and affect. Her behavior is normal.       Lines/Drains/Airways     Central Venous Catheter Line                 Port A Cath Single Lumen 02/01/17 0800 left subclavian 851 days          Drain                 Ileostomy RLQ -- days         Ileostomy 06/16/12 0000 RLQ 2543 days                Laboratory:  BMP:   Recent Labs   Lab 06/02/19  0313   GLU 92      K 3.5       CO2 24   BUN 4*   CREATININE 0.8   CALCIUM 8.9   , CBC:   Recent Labs   Lab 06/02/19  0313   WBC 14.55*  14.55*   HGB 10.4*  10.4*   HCT 32.5*  32.5*     296    and Urine Studies:   No results for input(s): COLORU, APPEARANCEUA, PHUR, SPECGRAV, PROTEINUA, GLUCUA, KETONESU, BILIRUBINUA, OCCULTUA, NITRITE, UROBILINOGEN, LEUKOCYTESUR, RBCUA, WBCUA, BACTERIA, SQUAMEPITHEL, HYALINECASTS in the last 48 hours.    Invalid input(s): WRIGHTSUR    Diagnostic Results:  CT Abdomen Pelvis:   EXAMINATION:  CT ABDOMEN PELVIS WITH CONTRAST    CLINICAL HISTORY:  post operative fever of unknown origin. Rule out abscess or bowel injury. Do not have high clinical suspicion based off of physical exam;    TECHNIQUE:  The patient was surveyed from the lung bases through the pelvis after the administration of 75 cc Omni 350 IV contrast as well as 30 cc Omni 350 oral contrast. The data was reconstructed for coronal, sagittal, and axial images.    COMPARISON:  MRI 05/15/2019; CT 12/06/2018; ultrasound 04/09/2019.    FINDINGS:  There are bandlike opacities within the right lower lobe consistent with atelectasis..  No pleural effusion is seen.    Central venous catheter tip partially visualized at the cavoatrial junction.  The visualized portions of the heart appear normal. No pericardial effusion.    The liver is normal in size and attenuation.  No focal hepatic abnormality is seen. The gallbladder is unremarkable.  No intrahepatic or extrahepatic biliary ductal dilatation is identified. The spleen is unremarkable.    The stomach, pancreas, and adrenal glands are unremarkable.    The kidneys are normal in size and location. There is a stable right renal cyst and bilateral subcentimeter hypodensities, too small to characterize but likely additional cysts.  Bilateral renal calculi are present measuring up to 0.3 cm on the right and 0.5 cm on the left.  There is mild left hydroureteronephrosis.  The right ureter appears  normal in course and caliber. The urinary bladder is unremarkable. The uterus is surgically absent.  There has been interval postsurgical changes of bilateral salpingo-oophorectomy.    There are postsurgical changes of colectomy with right lower quadrant end ileostomy.  No bowel obstruction.  In this patient with a history of Crohn's disease, evaluation for active bowel inflammation is limited by the presence of radiopaque oral contrast.    There is scattered foci of intraperitoneal free air, likely related to recent surgery.  Small volume free fluid is present throughout the abdomen and pelvis.  There is a large, peripherally enhancing fluid collection within the pelvis measuring 9.6 x 6.4 cm containing small foci of free air (axial image 65).  Additional smaller fluid collection is noted posteriorly on the left in the deep pelvis (axial image 66).  Additional collection present in the mid abdomen demonstrating mild rim enhancement measuring 4.1 x 3.3 cm (axial image 47).  Additional interloop fluid collections identified adjacent to a segment of small bowel in the left hemiabdomen (axial images 41 and 47).  No enteric contrast extravasation to suggest bowel injury.    There is no evidence of lymph node enlargement in the abdomen or pelvis.  The abdominal aorta is normal in course and caliber without significant atherosclerotic calcifications.    The osseous structures demonstrate no significant abnormality.  The extraperitoneal soft tissues demonstrate a healing midline abdominal incision.      Impression       1. In this patient with a history of recent bilateral salpingo-oophorectomy, there are nonspecific rim enhancing fluid collections in the abdomen and pelvis as discussed above, worrisome for abscesses or other postoperative fluid collections.  2. Scattered foci of intraperitoneal free air and small volume abdominal and pelvic free fluid, likely postsurgical in etiology.  3. Mild left hydroureteronephrosis,  not seen on prior studies.  Etiology of this finding is not definitively identified but could be related to pelvic inflammation.  Bilateral nonobstructing nephrolithiasis appear stable.  4. Postsurgical changes of hysterectomy and colectomy with right lower quadrant ileostomy.  5. Multiple additional findings as above.  This report was flagged in Epic as abnormal.    Electronically signed by resident: Mono Angelo  Date: 06/02/2019  Time: 00:23    Electronically signed by: Gerardo Chambers MD  Date: 06/02/2019  Time: 04:11            Assessment/Plan:   POD#4  s/p ExLap/BSO/JUAN  - Doing better this AM   - Tolerating po without n/v  - good ostomy output   - ambulating and voiding without difficulty  - post op H/H stable 10/34  - transitioned to po pain medication with IV dilaudid for breakthrough doing well. Patient requests to not change IV dilaudid to po today  - IV fluid discontinued, however patient requested them to stay on. OK to stay on, running at 75cc/hr.  - SCDs for DVT ppx.      Post operative Fever:  - John temp overnight to 101.7  - blood cultures prelim negative   - temp improved overnight with tylenol   - CT abdomen pelvis ordered overnight, concerning for 9x6 fluid collection, possible abscess.   - patient clinically appears non septic or toxic appearing  - BP stable, not hypotensive   - did have 100.6 temp on preop visit prior to surgery, possible underlying etiology   - LA 1.1>0.9  - WBC 11>16>14, AMP pending   - continue abx zosyn and linezolid (patient allergic to vancomycin), plan to keep on for at least 48 hours afebrile  - now with diagnosed fluid collection concerning for abscess as etiology of fever, consider IR drainage. Clinically improving on abx.   - patient made NPO overnight, IR scheduled to place drain in fluid collection today. Will discuss further with Dr. Wade Crohn's  - S/P total colectomy  - Avoid NSAIDs  - Monitor for ileostomy output         Depression/Anxiety  -  Continue home clonaxepam   - Continue remeron  - continue home ambiens         HTN  - No meds  - Monitor BP closely        GERD  - Continue zantac BID      HSV  - continue home valtrex   VTE Risk Mitigation (From admission, onward)        Ordered     Place sequential compression device  Until discontinued      05/30/19 0545     Place JEANNA hose  Until discontinued      05/30/19 0545            Beverly Fishman MD  Gynecologic Oncology  [facility]

## 2019-06-03 NOTE — PROGRESS NOTES
Aspiration of fluid collection and possible drain placement procedure complete. Patient tolerated fair. No apparent distress noted. Report called to RN on floor and patient taken to [facility] for recovery and report given bedside to RN.

## 2019-06-04 LAB
BASOPHILS # BLD AUTO: 0.01 K/UL (ref 0–0.2)
BASOPHILS NFR BLD: 0.2 % (ref 0–1.9)
DIFFERENTIAL METHOD: ABNORMAL
EOSINOPHIL # BLD AUTO: 0.4 K/UL (ref 0–0.5)
EOSINOPHIL NFR BLD: 6.1 % (ref 0–8)
ERYTHROCYTE [DISTWIDTH] IN BLOOD BY AUTOMATED COUNT: 14.5 % (ref 11.5–14.5)
HCT VFR BLD AUTO: 30 % (ref 37–48.5)
HGB BLD-MCNC: 9.6 G/DL (ref 12–16)
IMM GRANULOCYTES # BLD AUTO: 0.02 K/UL (ref 0–0.04)
IMM GRANULOCYTES NFR BLD AUTO: 0.3 % (ref 0–0.5)
LYMPHOCYTES # BLD AUTO: 2.1 K/UL (ref 1–4.8)
LYMPHOCYTES NFR BLD: 33.5 % (ref 18–48)
MCH RBC QN AUTO: 28.8 PG (ref 27–31)
MCHC RBC AUTO-ENTMCNC: 32 G/DL (ref 32–36)
MCV RBC AUTO: 90 FL (ref 82–98)
MONOCYTES # BLD AUTO: 0.6 K/UL (ref 0.3–1)
MONOCYTES NFR BLD: 9.9 % (ref 4–15)
NEUTROPHILS # BLD AUTO: 3.2 K/UL (ref 1.8–7.7)
NEUTROPHILS NFR BLD: 50 % (ref 38–73)
NRBC BLD-RTO: 0 /100 WBC
PLATELET # BLD AUTO: 351 K/UL (ref 150–350)
PMV BLD AUTO: 9.7 FL (ref 9.2–12.9)
RBC # BLD AUTO: 3.33 M/UL (ref 4–5.4)
WBC # BLD AUTO: 6.36 K/UL (ref 3.9–12.7)

## 2019-06-04 PROCEDURE — 63600175 PHARM REV CODE 636 W HCPCS: Performed by: STUDENT IN AN ORGANIZED HEALTH CARE EDUCATION/TRAINING PROGRAM

## 2019-06-04 PROCEDURE — 94761 N-INVAS EAR/PLS OXIMETRY MLT: CPT

## 2019-06-04 PROCEDURE — 25000003 PHARM REV CODE 250: Performed by: STUDENT IN AN ORGANIZED HEALTH CARE EDUCATION/TRAINING PROGRAM

## 2019-06-04 PROCEDURE — 63600175 PHARM REV CODE 636 W HCPCS: Performed by: OBSTETRICS & GYNECOLOGY

## 2019-06-04 PROCEDURE — 36415 COLL VENOUS BLD VENIPUNCTURE: CPT

## 2019-06-04 PROCEDURE — 20600001 HC STEP DOWN PRIVATE ROOM

## 2019-06-04 PROCEDURE — 99024 PR POST-OP FOLLOW-UP VISIT: ICD-10-PCS | Mod: ,,, | Performed by: OBSTETRICS & GYNECOLOGY

## 2019-06-04 PROCEDURE — 25000003 PHARM REV CODE 250: Performed by: OBSTETRICS & GYNECOLOGY

## 2019-06-04 PROCEDURE — G0378 HOSPITAL OBSERVATION PER HR: HCPCS

## 2019-06-04 PROCEDURE — 99024 POSTOP FOLLOW-UP VISIT: CPT | Mod: ,,, | Performed by: OBSTETRICS & GYNECOLOGY

## 2019-06-04 PROCEDURE — 85025 COMPLETE CBC W/AUTO DIFF WBC: CPT

## 2019-06-04 RX ADMIN — LINEZOLID 600 MG: 600 INJECTION, SOLUTION INTRAVENOUS at 11:06

## 2019-06-04 RX ADMIN — LINEZOLID 600 MG: 600 INJECTION, SOLUTION INTRAVENOUS at 12:06

## 2019-06-04 RX ADMIN — PIPERACILLIN AND TAZOBACTAM 4.5 G: 4; .5 INJECTION, POWDER, LYOPHILIZED, FOR SOLUTION INTRAVENOUS; PARENTERAL at 01:06

## 2019-06-04 RX ADMIN — OXYCODONE AND ACETAMINOPHEN 1 TABLET: 10; 325 TABLET ORAL at 08:06

## 2019-06-04 RX ADMIN — HYDROMORPHONE HYDROCHLORIDE 2 MG: 1 INJECTION, SOLUTION INTRAMUSCULAR; INTRAVENOUS; SUBCUTANEOUS at 11:06

## 2019-06-04 RX ADMIN — PROMETHAZINE HYDROCHLORIDE 12.5 MG: 25 INJECTION INTRAMUSCULAR; INTRAVENOUS at 04:06

## 2019-06-04 RX ADMIN — HYDROMORPHONE HYDROCHLORIDE 2 MG: 1 INJECTION, SOLUTION INTRAMUSCULAR; INTRAVENOUS; SUBCUTANEOUS at 01:06

## 2019-06-04 RX ADMIN — DRONABINOL 5 MG: 2.5 CAPSULE ORAL at 08:06

## 2019-06-04 RX ADMIN — OXYCODONE AND ACETAMINOPHEN 1 TABLET: 10; 325 TABLET ORAL at 09:06

## 2019-06-04 RX ADMIN — HYDROMORPHONE HYDROCHLORIDE 2 MG: 1 INJECTION, SOLUTION INTRAMUSCULAR; INTRAVENOUS; SUBCUTANEOUS at 03:06

## 2019-06-04 RX ADMIN — DRONABINOL 5 MG: 2.5 CAPSULE ORAL at 04:06

## 2019-06-04 RX ADMIN — OXYCODONE AND ACETAMINOPHEN 1 TABLET: 10; 325 TABLET ORAL at 01:06

## 2019-06-04 RX ADMIN — FAMOTIDINE 20 MG: 20 TABLET, FILM COATED ORAL at 08:06

## 2019-06-04 RX ADMIN — HYDROMORPHONE HYDROCHLORIDE 2 MG: 1 INJECTION, SOLUTION INTRAMUSCULAR; INTRAVENOUS; SUBCUTANEOUS at 09:06

## 2019-06-04 RX ADMIN — PROMETHAZINE HYDROCHLORIDE 12.5 MG: 25 INJECTION INTRAMUSCULAR; INTRAVENOUS at 02:06

## 2019-06-04 RX ADMIN — CEPHALEXIN 250 MG: 250 CAPSULE ORAL at 08:06

## 2019-06-04 RX ADMIN — VALACYCLOVIR HYDROCHLORIDE 500 MG: 500 TABLET, FILM COATED ORAL at 08:06

## 2019-06-04 RX ADMIN — ZOLPIDEM TARTRATE 10 MG: 5 TABLET ORAL at 11:06

## 2019-06-04 RX ADMIN — PIPERACILLIN AND TAZOBACTAM 4.5 G: 4; .5 INJECTION, POWDER, LYOPHILIZED, FOR SOLUTION INTRAVENOUS; PARENTERAL at 04:06

## 2019-06-04 RX ADMIN — MIRTAZAPINE 30 MG: 15 TABLET, ORALLY DISINTEGRATING ORAL at 08:06

## 2019-06-04 RX ADMIN — OXYCODONE AND ACETAMINOPHEN 1 TABLET: 10; 325 TABLET ORAL at 04:06

## 2019-06-04 RX ADMIN — PIPERACILLIN AND TAZOBACTAM 4.5 G: 4; .5 INJECTION, POWDER, LYOPHILIZED, FOR SOLUTION INTRAVENOUS; PARENTERAL at 08:06

## 2019-06-04 NOTE — PROGRESS NOTES
[facility]  Gynecologic Oncology  Progress Note      Patient Name: Ivy Salgado  MRN: [id number]  Admission Date: 5/30/2019  Hospital Length of Stay: 5 days  Attending Provider: Rupa German MD  Primary Care Provider: Kalia Astorga MD  Principal Problem: <principal problem not specified>    Follow-up For: Procedure(s) (LRB):  SALPINGO-OOPHORECTOMY, BILATERAL (Bilateral)  LAPAROTOMY, EXPLORATORY (N/A)  LYSIS, ADHESIONS (N/A)  Post-Operative Day: 5 Days Post-Op  Subjective:      History of Present Illness:  No notes on file    Hospital Course:  5/30/19 Presented for scheduled exlap/BSO/bilateral ovarian cystectomy with Dr. Parsons present for JUAN related to small bowel 2/2 to patient's history of Chron's, total colectomy, and ileostomy. Overall surgery went well, no complications, please see op note for details.  5/31/19. POD#1. Originally patient doing well, pain well controlled on PCA pump. By 11AM patient spiked temp of 103. Clinically patient did not appear toxic, abdomen was soft non distended. She was treated with tylenol and temp came down. She then became febrile again. WBC and LA ordered, both of which were normal. Overnight patient continued to spike temperatures to 102-103 with no resolution after giving tylenol. She was placed on cooling blanket. Blood cultures drawn, started on IV abx linezolid and zosyn. Eventually by morning temperature came down, she was afebrile. LA and WBC remained normal. Throughout this time patient was seen overnight and again in the morning, still clinically looked fair, did not appear toxic or septic. Abdomen remained soft and non distended.   6/1/19. POD#2. Afebrile this AM, continuing on IV abx. Did well throughout the day with po pain medication. Ambulating, voiding, tolerating po without difficulty. Overnight patient became febrile again to 100.9, blood culture ordered from port site. CT abdomen/pelvis with IV and po contrast ordered. Patient's  fever resolved by morning. Continue IV abx. Prelim blood cultures negative.   06/02/2019 POD#3 CT abdomen/pelvis concerning for possible abscess. Originally afebrile on rounding this AM however spiked temp to 103. Will consult IR for abscess drainage for source control of infection.   06/03/2019 POD#4 Had fever overnight, resolved with 1g tylenol. Overall fever curve decreasing. IR consulted about draining, tentatively scheduled for today. Will discuss with Dr. German.     Interval History:   Last fever yesterday @ 1630.  Tolerating regular diet without nausea/vomiting but didn't have much appetite yesterday. Ambulating and voiding without difficulty. Pain better controlled today.     Scheduled Meds:   cephALEXin  250 mg Oral QHS    dronabinol  5 mg Oral BID WM    famotidine  20 mg Oral BID    linezolid 600mg/300ml  600 mg Intravenous Q12H    mirtazapine  30 mg Oral QHS    mupirocin  1 g Nasal BID    piperacillin-tazobactam (ZOSYN) IVPB  4.5 g Intravenous Q8H    valACYclovir  500 mg Oral Daily     Continuous Infusions:    PRN Meds:calcium carbonate, clonazePAM, diphenhydrAMINE, diphenhydrAMINE, HYDROmorphone, iohexol, loperamide, naloxone, ondansetron, oxyCODONE-acetaminophen, oxyCODONE-acetaminophen, promethazine (PHENERGAN) IVPB, zolpidem    Review of patient's allergies indicates:   Allergen Reactions    Azathioprine sodium Other (See Comments)     Other reaction(s): pancreatitis  Other reaction(s): pancreatitis    Methotrexate analogues Other (See Comments)     leukopenia    Stelara [ustekinumab] Other (See Comments)     Multiple infections    Zofran [ondansetron hcl (pf)] Other (See Comments)     Per patient causes prolong QT    Vancomycin analogues Hives    Morphine Itching and Other (See Comments)     Other reaction(s): Itching    Bactrim [sulfamethoxazole-trimethoprim] Palpitations    Ciprofloxacin Palpitations       Objective:     Vital Signs (Most Recent):  Temp: 99 °F (37.2 °C) (06/04/19  0410)  Pulse: 109 (06/04/19 0410)  Resp: 16 (06/04/19 0410)  BP: 126/87 (06/04/19 0410)  SpO2: 96 % (06/04/19 0410) Vital Signs (24h Range):  Temp:  [98.7 °F (37.1 °C)-100.6 °F (38.1 °C)] 99 °F (37.2 °C)  Pulse:  [] 109  Resp:  [7-19] 16  SpO2:  [95 %-100 %] 96 %  BP: (103-142)/(63-87) 126/87     Weight: 52.6 kg (116 lb)  Body mass index is 21.92 kg/m².    Intake/Output - Last 3 Shifts       06/02 0700 - 06/03 0659 06/03 0700 - 06/04 0659    P.O. 600     IV Piggyback  450    Total Intake(mL/kg) 600 (11.4) 450 (8.6)    Urine (mL/kg/hr) 700 (0.6) 0 (0)    Other  30    Stool 150 150    Total Output 850 180    Net -250 +270          Urine Occurrence  2 x             Physical Exam:   Constitutional: She is oriented to person, place, and time. She appears well-developed and well-nourished. No distress.   Resting comfortably in bed       HENT:   Head: Normocephalic and atraumatic.    Eyes: Conjunctivae and EOM are normal.    Neck: Normal range of motion. Neck supple. No thyromegaly present.    Cardiovascular: Normal rate, regular rhythm and normal heart sounds.     Pulmonary/Chest: Effort normal and breath sounds normal. No respiratory distress.        Abdominal: Soft. She exhibits abdominal incision (c/d/i). She exhibits no distension. There is no tenderness.   Abdomen soft, non distended. Appropriate TTP    Ostomy site c/d/i with good output              Musculoskeletal: Normal range of motion and moves all extremeties. She exhibits no edema or tenderness.   MARI drain to right buttocks noted. C/d/i serous sanguinous drainage noted in drain, 30cc output        Neurological: She is alert and oriented to person, place, and time.    Skin: Skin is warm and dry. No rash noted.    Psychiatric: She has a normal mood and affect. Her behavior is normal.       Lines/Drains/Airways     Central Venous Catheter Line                 Port A Cath Single Lumen 02/01/17 0800 left subclavian 852 days          Drain                  Ileostomy RLQ -- days         Ileostomy 06/16/12 0000 RLQ 2544 days         Closed/Suction Drain 06/03/19 1510 Midline Back Bulb 8 Fr. less than 1 day                Laboratory:  BMP:   No results for input(s): GLU, NA, K, CL, CO2, BUN, CREATININE, CALCIUM, MG in the last 48 hours., CBC:   Recent Labs   Lab 06/03/19  0430 06/04/19  0420   WBC 7.05 6.36   HGB 9.4* 9.6*   HCT 30.0* 30.0*    351*    and Urine Studies:   No results for input(s): COLORU, APPEARANCEUA, PHUR, SPECGRAV, PROTEINUA, GLUCUA, KETONESU, BILIRUBINUA, OCCULTUA, NITRITE, UROBILINOGEN, LEUKOCYTESUR, RBCUA, WBCUA, BACTERIA, SQUAMEPITHEL, HYALINECASTS in the last 48 hours.    Invalid input(s): WRIGHTSUR    Diagnostic Results:  CT Abdomen Pelvis:   EXAMINATION:  CT ABDOMEN PELVIS WITH CONTRAST    CLINICAL HISTORY:  post operative fever of unknown origin. Rule out abscess or bowel injury. Do not have high clinical suspicion based off of physical exam;    TECHNIQUE:  The patient was surveyed from the lung bases through the pelvis after the administration of 75 cc Omni 350 IV contrast as well as 30 cc Omni 350 oral contrast. The data was reconstructed for coronal, sagittal, and axial images.    COMPARISON:  MRI 05/15/2019; CT 12/06/2018; ultrasound 04/09/2019.    FINDINGS:  There are bandlike opacities within the right lower lobe consistent with atelectasis..  No pleural effusion is seen.    Central venous catheter tip partially visualized at the cavoatrial junction.  The visualized portions of the heart appear normal. No pericardial effusion.    The liver is normal in size and attenuation.  No focal hepatic abnormality is seen. The gallbladder is unremarkable.  No intrahepatic or extrahepatic biliary ductal dilatation is identified. The spleen is unremarkable.    The stomach, pancreas, and adrenal glands are unremarkable.    The kidneys are normal in size and location. There is a stable right renal cyst and bilateral subcentimeter hypodensities,  too small to characterize but likely additional cysts.  Bilateral renal calculi are present measuring up to 0.3 cm on the right and 0.5 cm on the left.  There is mild left hydroureteronephrosis.  The right ureter appears normal in course and caliber. The urinary bladder is unremarkable. The uterus is surgically absent.  There has been interval postsurgical changes of bilateral salpingo-oophorectomy.    There are postsurgical changes of colectomy with right lower quadrant end ileostomy.  No bowel obstruction.  In this patient with a history of Crohn's disease, evaluation for active bowel inflammation is limited by the presence of radiopaque oral contrast.    There is scattered foci of intraperitoneal free air, likely related to recent surgery.  Small volume free fluid is present throughout the abdomen and pelvis.  There is a large, peripherally enhancing fluid collection within the pelvis measuring 9.6 x 6.4 cm containing small foci of free air (axial image 65).  Additional smaller fluid collection is noted posteriorly on the left in the deep pelvis (axial image 66).  Additional collection present in the mid abdomen demonstrating mild rim enhancement measuring 4.1 x 3.3 cm (axial image 47).  Additional interloop fluid collections identified adjacent to a segment of small bowel in the left hemiabdomen (axial images 41 and 47).  No enteric contrast extravasation to suggest bowel injury.    There is no evidence of lymph node enlargement in the abdomen or pelvis.  The abdominal aorta is normal in course and caliber without significant atherosclerotic calcifications.    The osseous structures demonstrate no significant abnormality.  The extraperitoneal soft tissues demonstrate a healing midline abdominal incision.      Impression       1. In this patient with a history of recent bilateral salpingo-oophorectomy, there are nonspecific rim enhancing fluid collections in the abdomen and pelvis as discussed above, worrisome  for abscesses or other postoperative fluid collections.  2. Scattered foci of intraperitoneal free air and small volume abdominal and pelvic free fluid, likely postsurgical in etiology.  3. Mild left hydroureteronephrosis, not seen on prior studies.  Etiology of this finding is not definitively identified but could be related to pelvic inflammation.  Bilateral nonobstructing nephrolithiasis appear stable.  4. Postsurgical changes of hysterectomy and colectomy with right lower quadrant ileostomy.  5. Multiple additional findings as above.  This report was flagged in Epic as abnormal.    Electronically signed by resident: Mono Angelo  Date: 06/02/2019  Time: 00:23    Electronically signed by: Gerardo Chambers MD  Date: 06/02/2019  Time: 04:11            Assessment/Plan:     POD#5  s/p ExLap/BSO/JUAN  - Doing better this AM   - Tolerating po without n/v  - good ostomy output   - ambulating and voiding without difficulty  - post op H/H stable 10/34  - transitioned to po pain medication with IV dilaudid for breakthrough doing well. Patient requests to not change IV dilaudid to po today  - IV fluid discontinued, however patient requested them to stay on. OK to stay on, running at 125cc/hr.  - SCDs for DVT ppx.      Post operative Fever:  - Last fever 100.5 @ 1630 yesterday   - blood cultures negative  - temp improved overnight with tylenol   - CT abdomen pelvis ordered overnight, concerning for 9x6 fluid collection, possible abscess.   - patient clinically appears non septic or toxic appearing  - BP stable, not hypotensive   - did have 100.6 temp on preop visit prior to surgery, possible underlying etiology   - LA 1.1>0.9  - WBC 11>16>14>7>6  - overall fever curve improving and WBC improving   - continue abx zosyn and linezolid (patient allergic to vancomycin), plan to keep on for at least 48 hours afebrile  - s/p IR drainage of potential pelvic abscess. Op note not in. Drained 30cc overnight. Pending fluid cultures.           Crohn's  - S/P total colectomy  - Avoid NSAIDs  - Monitor for ileostomy output         Depression/Anxiety  - Continue home clonaxepam   - Continue remeron  - continue home ambiens         HTN  - No meds  - Monitor BP closely        GERD  - Continue zantac BID      HSV  - continue home valtrex   VTE Risk Mitigation (From admission, onward)        Ordered     Place sequential compression device  Until discontinued      05/30/19 0545     Place JEANNA hose  Until discontinued      05/30/19 0545            Beverly Fishman MD  Gynecologic Oncology  [facility]

## 2019-06-04 NOTE — PLAN OF CARE
Problem: Adult Inpatient Plan of Care  Goal: Plan of Care Review  Outcome: Ongoing (interventions implemented as appropriate)  Pt AAO; independent. Vital signs stable and pt T max this shift 100.2 thus far . Patient receving PO and PRN pain medications and one time  IVPB of  Phenergan this shift .  Pt  Continues on Zozyn and Zvyox  receiving drains .  IV fluids continued .   Midline incision clean and dry. Lower right back drain draining minimal to MARI drain this shift see MAR .   Pt remained free from falls during shift. Patient up to shower this shift .   Bed lowest position and locked.  Call light in reach.  [facility]

## 2019-06-04 NOTE — PLAN OF CARE
Problem: Adult Inpatient Plan of Care  Goal: Plan of Care Review  Outcome: Ongoing (interventions implemented as appropriate)  Plan of care reviewed with the patient at the beginning of shift. The patient did have a fever at the beginning of shift that was reduced after her pain medication administration. Pain is being controlled with combination oral and IV pain medication. Pt did complain of increased pain tonight. MARI drain is producing sanguinous drainage. Denying all other complaints. VSS. Bed in low locked position. Call bell within reach. Will continue to monitor.

## 2019-06-04 NOTE — SUBJECTIVE & OBJECTIVE
Interval History:   Last fever yesterday @ 1630.  Tolerating regular diet without nausea/vomiting but didn't have much appetite yesterday. Ambulating and voiding without difficulty. Pain better controlled today.     Scheduled Meds:   cephALEXin  250 mg Oral QHS    dronabinol  5 mg Oral BID WM    famotidine  20 mg Oral BID    linezolid 600mg/300ml  600 mg Intravenous Q12H    mirtazapine  30 mg Oral QHS    mupirocin  1 g Nasal BID    piperacillin-tazobactam (ZOSYN) IVPB  4.5 g Intravenous Q8H    valACYclovir  500 mg Oral Daily     Continuous Infusions:    PRN Meds:calcium carbonate, clonazePAM, diphenhydrAMINE, diphenhydrAMINE, HYDROmorphone, iohexol, loperamide, naloxone, ondansetron, oxyCODONE-acetaminophen, oxyCODONE-acetaminophen, promethazine (PHENERGAN) IVPB, zolpidem    Review of patient's allergies indicates:   Allergen Reactions    Azathioprine sodium Other (See Comments)     Other reaction(s): pancreatitis  Other reaction(s): pancreatitis    Methotrexate analogues Other (See Comments)     leukopenia    Stelara [ustekinumab] Other (See Comments)     Multiple infections    Zofran [ondansetron hcl (pf)] Other (See Comments)     Per patient causes prolong QT    Vancomycin analogues Hives    Morphine Itching and Other (See Comments)     Other reaction(s): Itching    Bactrim [sulfamethoxazole-trimethoprim] Palpitations    Ciprofloxacin Palpitations       Objective:     Vital Signs (Most Recent):  Temp: 99 °F (37.2 °C) (06/04/19 0410)  Pulse: 109 (06/04/19 0410)  Resp: 16 (06/04/19 0410)  BP: 126/87 (06/04/19 0410)  SpO2: 96 % (06/04/19 0410) Vital Signs (24h Range):  Temp:  [98.7 °F (37.1 °C)-100.6 °F (38.1 °C)] 99 °F (37.2 °C)  Pulse:  [] 109  Resp:  [7-19] 16  SpO2:  [95 %-100 %] 96 %  BP: (103-142)/(63-87) 126/87     Weight: 52.6 kg (116 lb)  Body mass index is 21.92 kg/m².    Intake/Output - Last 3 Shifts       06/02 0700 - 06/03 0659 06/03 0700 - 06/04 0659    P.O. 600     IV Piggyback   450    Total Intake(mL/kg) 600 (11.4) 450 (8.6)    Urine (mL/kg/hr) 700 (0.6) 0 (0)    Other  30    Stool 150 150    Total Output 850 180    Net -250 +270          Urine Occurrence  2 x             Physical Exam:   Constitutional: She is oriented to person, place, and time. She appears well-developed and well-nourished. No distress.   Resting comfortably in bed       HENT:   Head: Normocephalic and atraumatic.    Eyes: Conjunctivae and EOM are normal.    Neck: Normal range of motion. Neck supple. No thyromegaly present.    Cardiovascular: Normal rate, regular rhythm and normal heart sounds.     Pulmonary/Chest: Effort normal and breath sounds normal. No respiratory distress.        Abdominal: Soft. She exhibits abdominal incision (c/d/i). She exhibits no distension. There is no tenderness.   Abdomen soft, non distended. Appropriate TTP    Ostomy site c/d/i with good output              Musculoskeletal: Normal range of motion and moves all extremeties. She exhibits no edema or tenderness.   MARI drain to right buttocks noted. C/d/i serous sanguinous drainage noted in drain, 30cc output        Neurological: She is alert and oriented to person, place, and time.    Skin: Skin is warm and dry. No rash noted.    Psychiatric: She has a normal mood and affect. Her behavior is normal.       Lines/Drains/Airways     Central Venous Catheter Line                 Port A Cath Single Lumen 02/01/17 0800 left subclavian 852 days          Drain                 Ileostomy RLQ -- days         Ileostomy 06/16/12 0000 RLQ 2544 days         Closed/Suction Drain 06/03/19 1510 Midline Back Bulb 8 Fr. less than 1 day                Laboratory:  BMP:   No results for input(s): GLU, NA, K, CL, CO2, BUN, CREATININE, CALCIUM, MG in the last 48 hours., CBC:   Recent Labs   Lab 06/03/19  0430 06/04/19  0420   WBC 7.05 6.36   HGB 9.4* 9.6*   HCT 30.0* 30.0*    351*    and Urine Studies:   No results for input(s): COLORU, APPEARANCEUA, PHUR,  SPECGRAV, PROTEINUA, GLUCUA, KETONESU, BILIRUBINUA, OCCULTUA, NITRITE, UROBILINOGEN, LEUKOCYTESUR, RBCUA, WBCUA, BACTERIA, SQUAMEPITHEL, HYALINECASTS in the last 48 hours.    Invalid input(s): CARISSA    Diagnostic Results:  CT Abdomen Pelvis:   EXAMINATION:  CT ABDOMEN PELVIS WITH CONTRAST    CLINICAL HISTORY:  post operative fever of unknown origin. Rule out abscess or bowel injury. Do not have high clinical suspicion based off of physical exam;    TECHNIQUE:  The patient was surveyed from the lung bases through the pelvis after the administration of 75 cc Omni 350 IV contrast as well as 30 cc Omni 350 oral contrast. The data was reconstructed for coronal, sagittal, and axial images.    COMPARISON:  MRI 05/15/2019; CT 12/06/2018; ultrasound 04/09/2019.    FINDINGS:  There are bandlike opacities within the right lower lobe consistent with atelectasis..  No pleural effusion is seen.    Central venous catheter tip partially visualized at the cavoatrial junction.  The visualized portions of the heart appear normal. No pericardial effusion.    The liver is normal in size and attenuation.  No focal hepatic abnormality is seen. The gallbladder is unremarkable.  No intrahepatic or extrahepatic biliary ductal dilatation is identified. The spleen is unremarkable.    The stomach, pancreas, and adrenal glands are unremarkable.    The kidneys are normal in size and location. There is a stable right renal cyst and bilateral subcentimeter hypodensities, too small to characterize but likely additional cysts.  Bilateral renal calculi are present measuring up to 0.3 cm on the right and 0.5 cm on the left.  There is mild left hydroureteronephrosis.  The right ureter appears normal in course and caliber. The urinary bladder is unremarkable. The uterus is surgically absent.  There has been interval postsurgical changes of bilateral salpingo-oophorectomy.    There are postsurgical changes of colectomy with right lower quadrant end  ileostomy.  No bowel obstruction.  In this patient with a history of Crohn's disease, evaluation for active bowel inflammation is limited by the presence of radiopaque oral contrast.    There is scattered foci of intraperitoneal free air, likely related to recent surgery.  Small volume free fluid is present throughout the abdomen and pelvis.  There is a large, peripherally enhancing fluid collection within the pelvis measuring 9.6 x 6.4 cm containing small foci of free air (axial image 65).  Additional smaller fluid collection is noted posteriorly on the left in the deep pelvis (axial image 66).  Additional collection present in the mid abdomen demonstrating mild rim enhancement measuring 4.1 x 3.3 cm (axial image 47).  Additional interloop fluid collections identified adjacent to a segment of small bowel in the left hemiabdomen (axial images 41 and 47).  No enteric contrast extravasation to suggest bowel injury.    There is no evidence of lymph node enlargement in the abdomen or pelvis.  The abdominal aorta is normal in course and caliber without significant atherosclerotic calcifications.    The osseous structures demonstrate no significant abnormality.  The extraperitoneal soft tissues demonstrate a healing midline abdominal incision.      Impression       1. In this patient with a history of recent bilateral salpingo-oophorectomy, there are nonspecific rim enhancing fluid collections in the abdomen and pelvis as discussed above, worrisome for abscesses or other postoperative fluid collections.  2. Scattered foci of intraperitoneal free air and small volume abdominal and pelvic free fluid, likely postsurgical in etiology.  3. Mild left hydroureteronephrosis, not seen on prior studies.  Etiology of this finding is not definitively identified but could be related to pelvic inflammation.  Bilateral nonobstructing nephrolithiasis appear stable.  4. Postsurgical changes of hysterectomy and colectomy with right lower  quadrant ileostomy.  5. Multiple additional findings as above.  This report was flagged in Epic as abnormal.    Electronically signed by resident: Mono Angelo  Date: 06/02/2019  Time: 00:23    Electronically signed by: Gerardo Chambers MD  Date: 06/02/2019  Time: 04:11

## 2019-06-05 ENCOUNTER — PATIENT MESSAGE (OUTPATIENT)
Dept: INTERNAL MEDICINE | Facility: CLINIC | Age: 39
End: 2019-06-05

## 2019-06-05 ENCOUNTER — TELEPHONE (OUTPATIENT)
Dept: INTERNAL MEDICINE | Facility: CLINIC | Age: 39
End: 2019-06-05

## 2019-06-05 LAB
ALBUMIN SERPL BCP-MCNC: 2.5 G/DL (ref 3.5–5.2)
ALBUMIN SERPL BCP-MCNC: 2.5 G/DL (ref 3.5–5.2)
ALP SERPL-CCNC: 186 U/L (ref 55–135)
ALP SERPL-CCNC: 186 U/L (ref 55–135)
ALT SERPL W/O P-5'-P-CCNC: 17 U/L (ref 10–44)
ALT SERPL W/O P-5'-P-CCNC: 17 U/L (ref 10–44)
ANION GAP SERPL CALC-SCNC: 8 MMOL/L (ref 8–16)
ANION GAP SERPL CALC-SCNC: 8 MMOL/L (ref 8–16)
AST SERPL-CCNC: 21 U/L (ref 10–40)
AST SERPL-CCNC: 21 U/L (ref 10–40)
BASOPHILS # BLD AUTO: 0.02 K/UL (ref 0–0.2)
BASOPHILS NFR BLD: 0.3 % (ref 0–1.9)
BILIRUB SERPL-MCNC: 0.6 MG/DL (ref 0.1–1)
BILIRUB SERPL-MCNC: 0.6 MG/DL (ref 0.1–1)
BUN SERPL-MCNC: 4 MG/DL (ref 6–20)
BUN SERPL-MCNC: 4 MG/DL (ref 6–20)
CALCIUM SERPL-MCNC: 8.9 MG/DL (ref 8.7–10.5)
CALCIUM SERPL-MCNC: 8.9 MG/DL (ref 8.7–10.5)
CHLORIDE SERPL-SCNC: 103 MMOL/L (ref 95–110)
CHLORIDE SERPL-SCNC: 103 MMOL/L (ref 95–110)
CO2 SERPL-SCNC: 27 MMOL/L (ref 23–29)
CO2 SERPL-SCNC: 27 MMOL/L (ref 23–29)
CREAT SERPL-MCNC: 0.7 MG/DL (ref 0.5–1.4)
CREAT SERPL-MCNC: 0.7 MG/DL (ref 0.5–1.4)
DIFFERENTIAL METHOD: ABNORMAL
EOSINOPHIL # BLD AUTO: 0.4 K/UL (ref 0–0.5)
EOSINOPHIL NFR BLD: 5.7 % (ref 0–8)
ERYTHROCYTE [DISTWIDTH] IN BLOOD BY AUTOMATED COUNT: 14.5 % (ref 11.5–14.5)
EST. GFR  (AFRICAN AMERICAN): >60 ML/MIN/1.73 M^2
EST. GFR  (AFRICAN AMERICAN): >60 ML/MIN/1.73 M^2
EST. GFR  (NON AFRICAN AMERICAN): >60 ML/MIN/1.73 M^2
EST. GFR  (NON AFRICAN AMERICAN): >60 ML/MIN/1.73 M^2
GLUCOSE SERPL-MCNC: 102 MG/DL (ref 70–110)
GLUCOSE SERPL-MCNC: 102 MG/DL (ref 70–110)
HCT VFR BLD AUTO: 28.7 % (ref 37–48.5)
HGB BLD-MCNC: 9.3 G/DL (ref 12–16)
IMM GRANULOCYTES # BLD AUTO: 0.01 K/UL (ref 0–0.04)
IMM GRANULOCYTES NFR BLD AUTO: 0.2 % (ref 0–0.5)
LYMPHOCYTES # BLD AUTO: 1.9 K/UL (ref 1–4.8)
LYMPHOCYTES NFR BLD: 30.7 % (ref 18–48)
MCH RBC QN AUTO: 28.9 PG (ref 27–31)
MCHC RBC AUTO-ENTMCNC: 32.4 G/DL (ref 32–36)
MCV RBC AUTO: 89 FL (ref 82–98)
MONOCYTES # BLD AUTO: 0.8 K/UL (ref 0.3–1)
MONOCYTES NFR BLD: 11.9 % (ref 4–15)
NEUTROPHILS # BLD AUTO: 3.2 K/UL (ref 1.8–7.7)
NEUTROPHILS NFR BLD: 51.2 % (ref 38–73)
NRBC BLD-RTO: 0 /100 WBC
PLATELET # BLD AUTO: 355 K/UL (ref 150–350)
PMV BLD AUTO: 9.3 FL (ref 9.2–12.9)
POTASSIUM SERPL-SCNC: 3 MMOL/L (ref 3.5–5.1)
POTASSIUM SERPL-SCNC: 3 MMOL/L (ref 3.5–5.1)
PROT SERPL-MCNC: 6.6 G/DL (ref 6–8.4)
PROT SERPL-MCNC: 6.6 G/DL (ref 6–8.4)
RBC # BLD AUTO: 3.22 M/UL (ref 4–5.4)
SODIUM SERPL-SCNC: 138 MMOL/L (ref 136–145)
SODIUM SERPL-SCNC: 138 MMOL/L (ref 136–145)
WBC # BLD AUTO: 6.31 K/UL (ref 3.9–12.7)

## 2019-06-05 PROCEDURE — 63600175 PHARM REV CODE 636 W HCPCS: Performed by: STUDENT IN AN ORGANIZED HEALTH CARE EDUCATION/TRAINING PROGRAM

## 2019-06-05 PROCEDURE — 63600175 PHARM REV CODE 636 W HCPCS: Performed by: OBSTETRICS & GYNECOLOGY

## 2019-06-05 PROCEDURE — 99024 POSTOP FOLLOW-UP VISIT: CPT | Mod: ,,, | Performed by: OBSTETRICS & GYNECOLOGY

## 2019-06-05 PROCEDURE — 80053 COMPREHEN METABOLIC PANEL: CPT

## 2019-06-05 PROCEDURE — 85025 COMPLETE CBC W/AUTO DIFF WBC: CPT

## 2019-06-05 PROCEDURE — 25000003 PHARM REV CODE 250: Performed by: STUDENT IN AN ORGANIZED HEALTH CARE EDUCATION/TRAINING PROGRAM

## 2019-06-05 PROCEDURE — 25000003 PHARM REV CODE 250: Performed by: OBSTETRICS & GYNECOLOGY

## 2019-06-05 PROCEDURE — 99024 PR POST-OP FOLLOW-UP VISIT: ICD-10-PCS | Mod: ,,, | Performed by: OBSTETRICS & GYNECOLOGY

## 2019-06-05 PROCEDURE — G0378 HOSPITAL OBSERVATION PER HR: HCPCS

## 2019-06-05 PROCEDURE — 20600001 HC STEP DOWN PRIVATE ROOM

## 2019-06-05 RX ADMIN — HYDROMORPHONE HYDROCHLORIDE 2 MG: 1 INJECTION, SOLUTION INTRAMUSCULAR; INTRAVENOUS; SUBCUTANEOUS at 04:06

## 2019-06-05 RX ADMIN — VALACYCLOVIR HYDROCHLORIDE 500 MG: 500 TABLET, FILM COATED ORAL at 08:06

## 2019-06-05 RX ADMIN — OXYCODONE AND ACETAMINOPHEN 1 TABLET: 10; 325 TABLET ORAL at 11:06

## 2019-06-05 RX ADMIN — LINEZOLID 600 MG: 600 INJECTION, SOLUTION INTRAVENOUS at 12:06

## 2019-06-05 RX ADMIN — DRONABINOL 5 MG: 2.5 CAPSULE ORAL at 06:06

## 2019-06-05 RX ADMIN — ZOLPIDEM TARTRATE 10 MG: 5 TABLET ORAL at 11:06

## 2019-06-05 RX ADMIN — OXYCODONE AND ACETAMINOPHEN 1 TABLET: 10; 325 TABLET ORAL at 06:06

## 2019-06-05 RX ADMIN — CLONAZEPAM 1 MG: 1 TABLET ORAL at 08:06

## 2019-06-05 RX ADMIN — MIRTAZAPINE 30 MG: 15 TABLET, ORALLY DISINTEGRATING ORAL at 08:06

## 2019-06-05 RX ADMIN — HYDROMORPHONE HYDROCHLORIDE 2 MG: 1 INJECTION, SOLUTION INTRAMUSCULAR; INTRAVENOUS; SUBCUTANEOUS at 08:06

## 2019-06-05 RX ADMIN — CEPHALEXIN 250 MG: 250 CAPSULE ORAL at 08:06

## 2019-06-05 RX ADMIN — CLONAZEPAM 1 MG: 1 TABLET ORAL at 11:06

## 2019-06-05 RX ADMIN — FAMOTIDINE 20 MG: 20 TABLET, FILM COATED ORAL at 08:06

## 2019-06-05 RX ADMIN — PROMETHAZINE HYDROCHLORIDE 12.5 MG: 25 INJECTION INTRAMUSCULAR; INTRAVENOUS at 06:06

## 2019-06-05 RX ADMIN — LINEZOLID 600 MG: 600 INJECTION, SOLUTION INTRAVENOUS at 11:06

## 2019-06-05 RX ADMIN — OXYCODONE AND ACETAMINOPHEN 1 TABLET: 10; 325 TABLET ORAL at 01:06

## 2019-06-05 RX ADMIN — PIPERACILLIN AND TAZOBACTAM 4.5 G: 4; .5 INJECTION, POWDER, LYOPHILIZED, FOR SOLUTION INTRAVENOUS; PARENTERAL at 01:06

## 2019-06-05 RX ADMIN — PIPERACILLIN AND TAZOBACTAM 4.5 G: 4; .5 INJECTION, POWDER, LYOPHILIZED, FOR SOLUTION INTRAVENOUS; PARENTERAL at 06:06

## 2019-06-05 RX ADMIN — DRONABINOL 5 MG: 2.5 CAPSULE ORAL at 08:06

## 2019-06-05 NOTE — SUBJECTIVE & OBJECTIVE
Interval History:   Afebrile throughout the day yesterday. Tolerating regular diet without nausea/vomiting. Ambulating and voiding without difficulty. Pain better controlled today.     Scheduled Meds:   cephALEXin  250 mg Oral QHS    dronabinol  5 mg Oral BID WM    famotidine  20 mg Oral BID    linezolid 600mg/300ml  600 mg Intravenous Q12H    mirtazapine  30 mg Oral QHS    piperacillin-tazobactam (ZOSYN) IVPB  4.5 g Intravenous Q8H    valACYclovir  500 mg Oral Daily     Continuous Infusions:    PRN Meds:calcium carbonate, clonazePAM, diphenhydrAMINE, diphenhydrAMINE, HYDROmorphone, iohexol, loperamide, ondansetron, oxyCODONE-acetaminophen, oxyCODONE-acetaminophen, promethazine (PHENERGAN) IVPB, zolpidem    Review of patient's allergies indicates:   Allergen Reactions    Azathioprine sodium Other (See Comments)     Other reaction(s): pancreatitis  Other reaction(s): pancreatitis    Methotrexate analogues Other (See Comments)     leukopenia    Stelara [ustekinumab] Other (See Comments)     Multiple infections    Zofran [ondansetron hcl (pf)] Other (See Comments)     Per patient causes prolong QT    Vancomycin analogues Hives    Morphine Itching and Other (See Comments)     Other reaction(s): Itching    Bactrim [sulfamethoxazole-trimethoprim] Palpitations    Ciprofloxacin Palpitations       Objective:     Vital Signs (Most Recent):  Temp: 99.1 °F (37.3 °C) (06/05/19 0431)  Pulse: 90 (06/05/19 0431)  Resp: 16 (06/05/19 0431)  BP: 123/80 (06/05/19 0431)  SpO2: 95 % (06/05/19 0431) Vital Signs (24h Range):  Temp:  [98.7 °F (37.1 °C)-100.2 °F (37.9 °C)] 99.1 °F (37.3 °C)  Pulse:  [] 90  Resp:  [16] 16  SpO2:  [95 %-98 %] 95 %  BP: (123-148)/(74-96) 123/80     Weight: 52.6 kg (116 lb)  Body mass index is 21.92 kg/m².    Intake/Output - Last 3 Shifts       06/03 0700 - 06/04 0659 06/04 0700 - 06/05 0659    IV Piggyback 450 500    Total Intake(mL/kg) 450 (8.6) 500 (9.5)    Urine (mL/kg/hr) 0 (0) 1100  (0.9)    Drains  30    Other 30     Stool 150 75    Total Output 180 1205    Net +270 -705          Urine Occurrence 2 x              Physical Exam:   Constitutional: She is oriented to person, place, and time. She appears well-developed and well-nourished. No distress.   Resting comfortably in bed       HENT:   Head: Normocephalic and atraumatic.    Eyes: Conjunctivae and EOM are normal.    Neck: Normal range of motion. Neck supple. No thyromegaly present.    Cardiovascular: Normal rate, regular rhythm and normal heart sounds.     Pulmonary/Chest: Effort normal and breath sounds normal. No respiratory distress.        Abdominal: Soft. She exhibits abdominal incision (c/d/i). She exhibits no distension. There is no tenderness.   Abdomen soft, non distended. Appropriate TTP    Ostomy site c/d/i with good output              Musculoskeletal: Normal range of motion and moves all extremeties. She exhibits no edema or tenderness.   MARI drain to right buttocks noted. C/d/i serous sanguinous drainage noted in drain, roughly 40cc in drain         Neurological: She is alert and oriented to person, place, and time.    Skin: Skin is warm and dry. No rash noted.    Psychiatric: She has a normal mood and affect. Her behavior is normal.       Lines/Drains/Airways     Central Venous Catheter Line                 Port A Cath Single Lumen 02/01/17 0800 left subclavian 853 days          Drain                 Ileostomy RLQ -- days         Ileostomy 06/16/12 0000 RLQ 2545 days         Closed/Suction Drain 06/03/19 1510 Midline Back Bulb 8 Fr. 1 day                Laboratory:  BMP:   Recent Labs   Lab 06/05/19  0400     102     138   K 3.0*  3.0*     103   CO2 27  27   BUN 4*  4*   CREATININE 0.7  0.7   CALCIUM 8.9  8.9   , CBC:   Recent Labs   Lab 06/04/19  0420 06/05/19  0400   WBC 6.36 6.31   HGB 9.6* 9.3*   HCT 30.0* 28.7*   * 355*    and Urine Studies:   No results for input(s): COLORU,  APPEARANCEUA, PHUR, SPECGRAV, PROTEINUA, GLUCUA, KETONESU, BILIRUBINUA, OCCULTUA, NITRITE, UROBILINOGEN, LEUKOCYTESUR, RBCUA, WBCUA, BACTERIA, SQUAMEPITHEL, HYALINECASTS in the last 48 hours.    Invalid input(s): CARISSA    Diagnostic Results:  CT Abdomen Pelvis:   EXAMINATION:  CT ABDOMEN PELVIS WITH CONTRAST    CLINICAL HISTORY:  post operative fever of unknown origin. Rule out abscess or bowel injury. Do not have high clinical suspicion based off of physical exam;    TECHNIQUE:  The patient was surveyed from the lung bases through the pelvis after the administration of 75 cc Omni 350 IV contrast as well as 30 cc Omni 350 oral contrast. The data was reconstructed for coronal, sagittal, and axial images.    COMPARISON:  MRI 05/15/2019; CT 12/06/2018; ultrasound 04/09/2019.    FINDINGS:  There are bandlike opacities within the right lower lobe consistent with atelectasis..  No pleural effusion is seen.    Central venous catheter tip partially visualized at the cavoatrial junction.  The visualized portions of the heart appear normal. No pericardial effusion.    The liver is normal in size and attenuation.  No focal hepatic abnormality is seen. The gallbladder is unremarkable.  No intrahepatic or extrahepatic biliary ductal dilatation is identified. The spleen is unremarkable.    The stomach, pancreas, and adrenal glands are unremarkable.    The kidneys are normal in size and location. There is a stable right renal cyst and bilateral subcentimeter hypodensities, too small to characterize but likely additional cysts.  Bilateral renal calculi are present measuring up to 0.3 cm on the right and 0.5 cm on the left.  There is mild left hydroureteronephrosis.  The right ureter appears normal in course and caliber. The urinary bladder is unremarkable. The uterus is surgically absent.  There has been interval postsurgical changes of bilateral salpingo-oophorectomy.    There are postsurgical changes of colectomy with right  lower quadrant end ileostomy.  No bowel obstruction.  In this patient with a history of Crohn's disease, evaluation for active bowel inflammation is limited by the presence of radiopaque oral contrast.    There is scattered foci of intraperitoneal free air, likely related to recent surgery.  Small volume free fluid is present throughout the abdomen and pelvis.  There is a large, peripherally enhancing fluid collection within the pelvis measuring 9.6 x 6.4 cm containing small foci of free air (axial image 65).  Additional smaller fluid collection is noted posteriorly on the left in the deep pelvis (axial image 66).  Additional collection present in the mid abdomen demonstrating mild rim enhancement measuring 4.1 x 3.3 cm (axial image 47).  Additional interloop fluid collections identified adjacent to a segment of small bowel in the left hemiabdomen (axial images 41 and 47).  No enteric contrast extravasation to suggest bowel injury.    There is no evidence of lymph node enlargement in the abdomen or pelvis.  The abdominal aorta is normal in course and caliber without significant atherosclerotic calcifications.    The osseous structures demonstrate no significant abnormality.  The extraperitoneal soft tissues demonstrate a healing midline abdominal incision.      Impression       1. In this patient with a history of recent bilateral salpingo-oophorectomy, there are nonspecific rim enhancing fluid collections in the abdomen and pelvis as discussed above, worrisome for abscesses or other postoperative fluid collections.  2. Scattered foci of intraperitoneal free air and small volume abdominal and pelvic free fluid, likely postsurgical in etiology.  3. Mild left hydroureteronephrosis, not seen on prior studies.  Etiology of this finding is not definitively identified but could be related to pelvic inflammation.  Bilateral nonobstructing nephrolithiasis appear stable.  4. Postsurgical changes of hysterectomy and colectomy  with right lower quadrant ileostomy.  5. Multiple additional findings as above.  This report was flagged in Epic as abnormal.    Electronically signed by resident: Mono Angelo  Date: 06/02/2019  Time: 00:23    Electronically signed by: Gerardo Chambers MD  Date: 06/02/2019  Time: 04:11

## 2019-06-05 NOTE — TELEPHONE ENCOUNTER
Spoke to Arun. PA approval number [id number] effective through 6/4/2020.     Pt notified via portal of approval.    Pharmacy notified/verified approval.

## 2019-06-05 NOTE — PLAN OF CARE
Problem: Adult Inpatient Plan of Care  Goal: Plan of Care Review  Outcome: Ongoing (interventions implemented as appropriate)  Pt AAOx4, VSS, in NAD throughout shift.  Pt on telemetry monitoring: NSR at rest and ST when ambulating to restroom.  Pt receiving linezolid and zosyn for fevers of unknown origin.  Pain medication given as requested (per MAR) for MARI site pain and generalized abdominal pain.  Pt tolerating all medications and interventions well.  RN maintaining fall risk precautions throughout shift.  Pt denies need at this time; RN will continue to monitor, assess, and alter plan of care as needed until report given to oncoming night shift nurse.

## 2019-06-05 NOTE — TELEPHONE ENCOUNTER
Quiana with PA contact stated that pt does not have active insurance at this time. Pt informed of PA status via portal.

## 2019-06-05 NOTE — TELEPHONE ENCOUNTER
----- Message from Cosmo Aguirre sent at 6/5/2019 11:37 AM CDT -----  Contact: moises pimentel   Name of Who is Calling: moises pimentel     What is the request in detail:moises pimentel is requesting a PA for clonazePAM (KLONOPIN) 1 MG ..................................... Please contact to further discuss and advise      Can the clinic reply by [facility]: No    What [facility]  [phone]

## 2019-06-05 NOTE — PLAN OF CARE
Problem: Adult Inpatient Plan of Care  Goal: Plan of Care Review  Outcome: Ongoing (interventions implemented as appropriate)  Plan of care reviewed with the patient at the beginning of shift. The patient is continuing to complain of pain. Controlled with PO and IV medication. ABX treatment continued. Tmax 99.9. Denying all other complaints at this time. Bed in low locked position. Call bell within reach. Will continue to monitor.

## 2019-06-05 NOTE — PROGRESS NOTES
[facility]  Gynecologic Oncology  Progress Note      Patient Name: Ivy Salgado  MRN: [id number]  Admission Date: 5/30/2019  Hospital Length of Stay: 6 days  Attending Provider: Rupa German MD  Primary Care Provider: Kalia Astorga MD  Principal Problem: <principal problem not specified>    Follow-up For: Procedure(s) (LRB):  SALPINGO-OOPHORECTOMY, BILATERAL (Bilateral)  LAPAROTOMY, EXPLORATORY (N/A)  LYSIS, ADHESIONS (N/A)  Post-Operative Day: 6 Days Post-Op  Subjective:      History of Present Illness:  No notes on file    Hospital Course:  5/30/19 Presented for scheduled exlap/BSO/bilateral ovarian cystectomy with Dr. Parsons present for JUAN related to small bowel 2/2 to patient's history of Chron's, total colectomy, and ileostomy. Overall surgery went well, no complications, please see op note for details.  5/31/19. POD#1. Originally patient doing well, pain well controlled on PCA pump. By 11AM patient spiked temp of 103. Clinically patient did not appear toxic, abdomen was soft non distended. She was treated with tylenol and temp came down. She then became febrile again. WBC and LA ordered, both of which were normal. Overnight patient continued to spike temperatures to 102-103 with no resolution after giving tylenol. She was placed on cooling blanket. Blood cultures drawn, started on IV abx linezolid and zosyn. Eventually by morning temperature came down, she was afebrile. LA and WBC remained normal. Throughout this time patient was seen overnight and again in the morning, still clinically looked fair, did not appear toxic or septic. Abdomen remained soft and non distended.   6/1/19. POD#2. Afebrile this AM, continuing on IV abx. Did well throughout the day with po pain medication. Ambulating, voiding, tolerating po without difficulty. Overnight patient became febrile again to 100.9, blood culture ordered from port site. CT abdomen/pelvis with IV and po contrast ordered. Patient's  fever resolved by morning. Continue IV abx. Prelim blood cultures negative.   06/02/2019 POD#3 CT abdomen/pelvis concerning for possible abscess. Originally afebrile on rounding this AM however spiked temp to 103. Will consult IR for abscess drainage for source control of infection.   06/03/2019 POD#4 Had fever overnight, resolved with 1g tylenol. Overall fever curve decreasing. IR consulted about draining, tentatively scheduled for today. Will discuss with Dr. German.     Interval History:   Afebrile throughout the day yesterday. Tolerating regular diet without nausea/vomiting. Ambulating and voiding without difficulty. Pain better controlled today.     Scheduled Meds:   cephALEXin  250 mg Oral QHS    dronabinol  5 mg Oral BID WM    famotidine  20 mg Oral BID    linezolid 600mg/300ml  600 mg Intravenous Q12H    mirtazapine  30 mg Oral QHS    piperacillin-tazobactam (ZOSYN) IVPB  4.5 g Intravenous Q8H    valACYclovir  500 mg Oral Daily     Continuous Infusions:    PRN Meds:calcium carbonate, clonazePAM, diphenhydrAMINE, diphenhydrAMINE, HYDROmorphone, iohexol, loperamide, ondansetron, oxyCODONE-acetaminophen, oxyCODONE-acetaminophen, promethazine (PHENERGAN) IVPB, zolpidem    Review of patient's allergies indicates:   Allergen Reactions    Azathioprine sodium Other (See Comments)     Other reaction(s): pancreatitis  Other reaction(s): pancreatitis    Methotrexate analogues Other (See Comments)     leukopenia    Stelara [ustekinumab] Other (See Comments)     Multiple infections    Zofran [ondansetron hcl (pf)] Other (See Comments)     Per patient causes prolong QT    Vancomycin analogues Hives    Morphine Itching and Other (See Comments)     Other reaction(s): Itching    Bactrim [sulfamethoxazole-trimethoprim] Palpitations    Ciprofloxacin Palpitations       Objective:     Vital Signs (Most Recent):  Temp: 99.1 °F (37.3 °C) (06/05/19 0431)  Pulse: 90 (06/05/19 0431)  Resp: 16 (06/05/19 0431)  BP: 123/80  (06/05/19 0431)  SpO2: 95 % (06/05/19 0431) Vital Signs (24h Range):  Temp:  [98.7 °F (37.1 °C)-100.2 °F (37.9 °C)] 99.1 °F (37.3 °C)  Pulse:  [] 90  Resp:  [16] 16  SpO2:  [95 %-98 %] 95 %  BP: (123-148)/(74-96) 123/80     Weight: 52.6 kg (116 lb)  Body mass index is 21.92 kg/m².    Intake/Output - Last 3 Shifts       06/03 0700 - 06/04 0659 06/04 0700 - 06/05 0659    IV Piggyback 450 500    Total Intake(mL/kg) 450 (8.6) 500 (9.5)    Urine (mL/kg/hr) 0 (0) 1100 (0.9)    Drains  30    Other 30     Stool 150 75    Total Output 180 1205    Net +270 -705          Urine Occurrence 2 x              Physical Exam:   Constitutional: She is oriented to person, place, and time. She appears well-developed and well-nourished. No distress.   Resting comfortably in bed       HENT:   Head: Normocephalic and atraumatic.    Eyes: Conjunctivae and EOM are normal.    Neck: Normal range of motion. Neck supple. No thyromegaly present.    Cardiovascular: Normal rate, regular rhythm and normal heart sounds.     Pulmonary/Chest: Effort normal and breath sounds normal. No respiratory distress.        Abdominal: Soft. She exhibits abdominal incision (c/d/i). She exhibits no distension. There is no tenderness.   Abdomen soft, non distended. Appropriate TTP    Ostomy site c/d/i with good output              Musculoskeletal: Normal range of motion and moves all extremeties. She exhibits no edema or tenderness.   MARI drain to right buttocks noted. C/d/i serous sanguinous drainage noted in drain, roughly 40cc in drain         Neurological: She is alert and oriented to person, place, and time.    Skin: Skin is warm and dry. No rash noted.    Psychiatric: She has a normal mood and affect. Her behavior is normal.       Lines/Drains/Airways     Central Venous Catheter Line                 Port A Cath Single Lumen 02/01/17 0800 left subclavian 853 days          Drain                 Ileostomy RLQ -- days         Ileostomy 06/16/12 0000 Q 7335  days         Closed/Suction Drain 06/03/19 1510 Midline Back Bulb 8 Fr. 1 day                Laboratory:  BMP:   Recent Labs   Lab 06/05/19  0400     102     138   K 3.0*  3.0*     103   CO2 27  27   BUN 4*  4*   CREATININE 0.7  0.7   CALCIUM 8.9  8.9   , CBC:   Recent Labs   Lab 06/04/19  0420 06/05/19  0400   WBC 6.36 6.31   HGB 9.6* 9.3*   HCT 30.0* 28.7*   * 355*    and Urine Studies:   No results for input(s): COLORU, APPEARANCEUA, PHUR, SPECGRAV, PROTEINUA, GLUCUA, KETONESU, BILIRUBINUA, OCCULTUA, NITRITE, UROBILINOGEN, LEUKOCYTESUR, RBCUA, WBCUA, BACTERIA, SQUAMEPITHEL, HYALINECASTS in the last 48 hours.    Invalid input(s): WRIGHTSUR    Diagnostic Results:  CT Abdomen Pelvis:   EXAMINATION:  CT ABDOMEN PELVIS WITH CONTRAST    CLINICAL HISTORY:  post operative fever of unknown origin. Rule out abscess or bowel injury. Do not have high clinical suspicion based off of physical exam;    TECHNIQUE:  The patient was surveyed from the lung bases through the pelvis after the administration of 75 cc Omni 350 IV contrast as well as 30 cc Omni 350 oral contrast. The data was reconstructed for coronal, sagittal, and axial images.    COMPARISON:  MRI 05/15/2019; CT 12/06/2018; ultrasound 04/09/2019.    FINDINGS:  There are bandlike opacities within the right lower lobe consistent with atelectasis..  No pleural effusion is seen.    Central venous catheter tip partially visualized at the cavoatrial junction.  The visualized portions of the heart appear normal. No pericardial effusion.    The liver is normal in size and attenuation.  No focal hepatic abnormality is seen. The gallbladder is unremarkable.  No intrahepatic or extrahepatic biliary ductal dilatation is identified. The spleen is unremarkable.    The stomach, pancreas, and adrenal glands are unremarkable.    The kidneys are normal in size and location. There is a stable right renal cyst and bilateral subcentimeter hypodensities,  too small to characterize but likely additional cysts.  Bilateral renal calculi are present measuring up to 0.3 cm on the right and 0.5 cm on the left.  There is mild left hydroureteronephrosis.  The right ureter appears normal in course and caliber. The urinary bladder is unremarkable. The uterus is surgically absent.  There has been interval postsurgical changes of bilateral salpingo-oophorectomy.    There are postsurgical changes of colectomy with right lower quadrant end ileostomy.  No bowel obstruction.  In this patient with a history of Crohn's disease, evaluation for active bowel inflammation is limited by the presence of radiopaque oral contrast.    There is scattered foci of intraperitoneal free air, likely related to recent surgery.  Small volume free fluid is present throughout the abdomen and pelvis.  There is a large, peripherally enhancing fluid collection within the pelvis measuring 9.6 x 6.4 cm containing small foci of free air (axial image 65).  Additional smaller fluid collection is noted posteriorly on the left in the deep pelvis (axial image 66).  Additional collection present in the mid abdomen demonstrating mild rim enhancement measuring 4.1 x 3.3 cm (axial image 47).  Additional interloop fluid collections identified adjacent to a segment of small bowel in the left hemiabdomen (axial images 41 and 47).  No enteric contrast extravasation to suggest bowel injury.    There is no evidence of lymph node enlargement in the abdomen or pelvis.  The abdominal aorta is normal in course and caliber without significant atherosclerotic calcifications.    The osseous structures demonstrate no significant abnormality.  The extraperitoneal soft tissues demonstrate a healing midline abdominal incision.      Impression       1. In this patient with a history of recent bilateral salpingo-oophorectomy, there are nonspecific rim enhancing fluid collections in the abdomen and pelvis as discussed above, worrisome  for abscesses or other postoperative fluid collections.  2. Scattered foci of intraperitoneal free air and small volume abdominal and pelvic free fluid, likely postsurgical in etiology.  3. Mild left hydroureteronephrosis, not seen on prior studies.  Etiology of this finding is not definitively identified but could be related to pelvic inflammation.  Bilateral nonobstructing nephrolithiasis appear stable.  4. Postsurgical changes of hysterectomy and colectomy with right lower quadrant ileostomy.  5. Multiple additional findings as above.  This report was flagged in Epic as abnormal.    Electronically signed by resident: Mono Angelo  Date: 06/02/2019  Time: 00:23    Electronically signed by: Gerardo Chambers MD  Date: 06/02/2019  Time: 04:11            Assessment/Plan:   POD#6  s/p ExLap/BSO/JUAN  - Doing better this AM   - Tolerating po without n/v  - good ostomy output   - ambulating and voiding without difficulty  - post op H/H stable 10/34  - transitioned to po pain medication with IV dilaudid for breakthrough doing well. Patient requests to not change IV dilaudid to po today  - IV fluid discontinued, however patient requested them to stay on. OK to stay on, running at 125cc/hr.  - SCDs for DVT ppx.   - will work with  for medication coverage through insurance      Post operative Fever:  - has been afebrile for 24 hours   - blood cultures negative  - did have 100.6 temp on preop visit prior to surgery, possible underlying etiology   - LA 1.1>0.9  - WBC 11>16>14>7>6>6  - overall fever curve improving and WBC improving   - continue abx zosyn and linezolid (patient allergic to vancomycin), plan to keep on for at least 48 hours afebrile  - POD#2 s/p IR drainage. Roughly 40cc overnight. Cultures still pending for anaerobic and fungal cultures. Will touch base about when drain can come out.         Crohn's  - S/P total colectomy  - Avoid NSAIDs  - Monitor for ileostomy output         Depression/Anxiety  - Continue  home clonaxepam   - Continue remeron  - continue home ambiens         HTN  - No meds  - Monitor BP closely        GERD  - Continue zantac BID      HSV  - continue home valtrex     VTE Risk Mitigation (From admission, onward)        Ordered     Place sequential compression device  Until discontinued      05/30/19 0545     Place JEANNA hose  Until discontinued      05/30/19 0545            Beverly Fishman MD  Gynecologic Oncology  [facility]

## 2019-06-06 LAB
BACTERIA BLD CULT: NORMAL
BACTERIA BLD CULT: NORMAL
BACTERIA SPEC AEROBE CULT: NO GROWTH
BASOPHILS # BLD AUTO: 0.04 K/UL (ref 0–0.2)
BASOPHILS NFR BLD: 0.6 % (ref 0–1.9)
DIFFERENTIAL METHOD: ABNORMAL
EOSINOPHIL # BLD AUTO: 0.4 K/UL (ref 0–0.5)
EOSINOPHIL NFR BLD: 6.3 % (ref 0–8)
ERYTHROCYTE [DISTWIDTH] IN BLOOD BY AUTOMATED COUNT: 14.4 % (ref 11.5–14.5)
HCT VFR BLD AUTO: 29.6 % (ref 37–48.5)
HGB BLD-MCNC: 9.9 G/DL (ref 12–16)
IMM GRANULOCYTES # BLD AUTO: 0.03 K/UL (ref 0–0.04)
IMM GRANULOCYTES NFR BLD AUTO: 0.5 % (ref 0–0.5)
LYMPHOCYTES # BLD AUTO: 2.1 K/UL (ref 1–4.8)
LYMPHOCYTES NFR BLD: 32.5 % (ref 18–48)
MCH RBC QN AUTO: 29.5 PG (ref 27–31)
MCHC RBC AUTO-ENTMCNC: 33.4 G/DL (ref 32–36)
MCV RBC AUTO: 88 FL (ref 82–98)
MONOCYTES # BLD AUTO: 0.7 K/UL (ref 0.3–1)
MONOCYTES NFR BLD: 11.1 % (ref 4–15)
NEUTROPHILS # BLD AUTO: 3.1 K/UL (ref 1.8–7.7)
NEUTROPHILS NFR BLD: 49 % (ref 38–73)
NRBC BLD-RTO: 0 /100 WBC
PLATELET # BLD AUTO: 393 K/UL (ref 150–350)
PMV BLD AUTO: 9.3 FL (ref 9.2–12.9)
RBC # BLD AUTO: 3.36 M/UL (ref 4–5.4)
WBC # BLD AUTO: 6.37 K/UL (ref 3.9–12.7)

## 2019-06-06 PROCEDURE — 25000003 PHARM REV CODE 250: Performed by: STUDENT IN AN ORGANIZED HEALTH CARE EDUCATION/TRAINING PROGRAM

## 2019-06-06 PROCEDURE — 99024 PR POST-OP FOLLOW-UP VISIT: ICD-10-PCS | Mod: ,,, | Performed by: OBSTETRICS & GYNECOLOGY

## 2019-06-06 PROCEDURE — 85025 COMPLETE CBC W/AUTO DIFF WBC: CPT

## 2019-06-06 PROCEDURE — G0378 HOSPITAL OBSERVATION PER HR: HCPCS

## 2019-06-06 PROCEDURE — 63600175 PHARM REV CODE 636 W HCPCS: Performed by: STUDENT IN AN ORGANIZED HEALTH CARE EDUCATION/TRAINING PROGRAM

## 2019-06-06 PROCEDURE — 63600175 PHARM REV CODE 636 W HCPCS: Performed by: OBSTETRICS & GYNECOLOGY

## 2019-06-06 PROCEDURE — 20600001 HC STEP DOWN PRIVATE ROOM

## 2019-06-06 PROCEDURE — 25000003 PHARM REV CODE 250: Performed by: OBSTETRICS & GYNECOLOGY

## 2019-06-06 PROCEDURE — 99024 POSTOP FOLLOW-UP VISIT: CPT | Mod: ,,, | Performed by: OBSTETRICS & GYNECOLOGY

## 2019-06-06 RX ORDER — FLUCONAZOLE 150 MG/1
150 TABLET ORAL ONCE
Qty: 1 TABLET | Refills: 0 | Status: SHIPPED | OUTPATIENT
Start: 2019-06-06 | End: 2019-06-07

## 2019-06-06 RX ORDER — ESTRADIOL 0.05 MG/D
1 PATCH TRANSDERMAL
Status: DISCONTINUED | OUTPATIENT
Start: 2019-06-06 | End: 2019-06-07 | Stop reason: HOSPADM

## 2019-06-06 RX ORDER — OXYCODONE AND ACETAMINOPHEN 10; 325 MG/1; MG/1
1 TABLET ORAL EVERY 4 HOURS PRN
Qty: 40 TABLET | Refills: 0 | Status: SHIPPED | OUTPATIENT
Start: 2019-06-06 | End: 2019-06-07

## 2019-06-06 RX ORDER — FLUCONAZOLE 150 MG/1
150 TABLET ORAL ONCE
Status: COMPLETED | OUTPATIENT
Start: 2019-06-06 | End: 2019-06-06

## 2019-06-06 RX ORDER — ESTRADIOL 0.05 MG/D
1 PATCH TRANSDERMAL
Status: DISCONTINUED | OUTPATIENT
Start: 2019-06-06 | End: 2019-06-06

## 2019-06-06 RX ADMIN — CEPHALEXIN 250 MG: 250 CAPSULE ORAL at 08:06

## 2019-06-06 RX ADMIN — FAMOTIDINE 20 MG: 20 TABLET, FILM COATED ORAL at 08:06

## 2019-06-06 RX ADMIN — ESTRADIOL 1 PATCH: 0.05 PATCH TRANSDERMAL at 09:06

## 2019-06-06 RX ADMIN — MIRTAZAPINE 30 MG: 15 TABLET, ORALLY DISINTEGRATING ORAL at 08:06

## 2019-06-06 RX ADMIN — FLUCONAZOLE 150 MG: 150 TABLET ORAL at 09:06

## 2019-06-06 RX ADMIN — CLONAZEPAM 1 MG: 1 TABLET ORAL at 02:06

## 2019-06-06 RX ADMIN — OXYCODONE AND ACETAMINOPHEN 1 TABLET: 10; 325 TABLET ORAL at 06:06

## 2019-06-06 RX ADMIN — ZOLPIDEM TARTRATE 10 MG: 5 TABLET ORAL at 11:06

## 2019-06-06 RX ADMIN — HYDROMORPHONE HYDROCHLORIDE 2 MG: 1 INJECTION, SOLUTION INTRAMUSCULAR; INTRAVENOUS; SUBCUTANEOUS at 10:06

## 2019-06-06 RX ADMIN — HYDROMORPHONE HYDROCHLORIDE 2 MG: 1 INJECTION, SOLUTION INTRAMUSCULAR; INTRAVENOUS; SUBCUTANEOUS at 02:06

## 2019-06-06 RX ADMIN — PROMETHAZINE HYDROCHLORIDE 12.5 MG: 25 INJECTION INTRAMUSCULAR; INTRAVENOUS at 06:06

## 2019-06-06 RX ADMIN — OXYCODONE AND ACETAMINOPHEN 1 TABLET: 10; 325 TABLET ORAL at 01:06

## 2019-06-06 RX ADMIN — PIPERACILLIN AND TAZOBACTAM 4.5 G: 4; .5 INJECTION, POWDER, LYOPHILIZED, FOR SOLUTION INTRAVENOUS; PARENTERAL at 02:06

## 2019-06-06 RX ADMIN — OXYCODONE AND ACETAMINOPHEN 1 TABLET: 10; 325 TABLET ORAL at 11:06

## 2019-06-06 RX ADMIN — VALACYCLOVIR HYDROCHLORIDE 500 MG: 500 TABLET, FILM COATED ORAL at 08:06

## 2019-06-06 RX ADMIN — DRONABINOL 5 MG: 2.5 CAPSULE ORAL at 06:06

## 2019-06-06 RX ADMIN — DRONABINOL 5 MG: 2.5 CAPSULE ORAL at 08:06

## 2019-06-06 RX ADMIN — HYDROMORPHONE HYDROCHLORIDE 2 MG: 1 INJECTION, SOLUTION INTRAMUSCULAR; INTRAVENOUS; SUBCUTANEOUS at 08:06

## 2019-06-06 NOTE — SUBJECTIVE & OBJECTIVE
Interval History:   Afebrile throughout the day yesterday. Tolerating regular diet without nausea/vomiting. Ambulating and voiding without difficulty. Pain better controlled today. Thinks may be having hot flashes. Interested in HRT.     Scheduled Meds:   cephALEXin  250 mg Oral QHS    dronabinol  5 mg Oral BID WM    famotidine  20 mg Oral BID    linezolid 600mg/300ml  600 mg Intravenous Q12H    mirtazapine  30 mg Oral QHS    piperacillin-tazobactam (ZOSYN) IVPB  4.5 g Intravenous Q8H    valACYclovir  500 mg Oral Daily     Continuous Infusions:    PRN Meds:calcium carbonate, clonazePAM, diphenhydrAMINE, diphenhydrAMINE, HYDROmorphone, iohexol, loperamide, ondansetron, oxyCODONE-acetaminophen, oxyCODONE-acetaminophen, promethazine (PHENERGAN) IVPB, zolpidem    Review of patient's allergies indicates:   Allergen Reactions    Azathioprine sodium Other (See Comments)     Other reaction(s): pancreatitis  Other reaction(s): pancreatitis    Methotrexate analogues Other (See Comments)     leukopenia    Stelara [ustekinumab] Other (See Comments)     Multiple infections    Zofran [ondansetron hcl (pf)] Other (See Comments)     Per patient causes prolong QT    Vancomycin analogues Hives    Morphine Itching and Other (See Comments)     Other reaction(s): Itching    Bactrim [sulfamethoxazole-trimethoprim] Palpitations    Ciprofloxacin Palpitations       Objective:     Vital Signs (Most Recent):  Temp: 98.9 °F (37.2 °C) (06/06/19 0352)  Pulse: 88 (06/06/19 0352)  Resp: 16 (06/06/19 0352)  BP: 129/76 (06/06/19 0352)  SpO2: 98 % (06/06/19 0352) Vital Signs (24h Range):  Temp:  [98.9 °F (37.2 °C)-100.1 °F (37.8 °C)] 98.9 °F (37.2 °C)  Pulse:  [] 88  Resp:  [16-18] 16  SpO2:  [94 %-99 %] 98 %  BP: (129-145)/(76-94) 129/76     Weight: 52.6 kg (116 lb)  Body mass index is 21.92 kg/m².    Intake/Output - Last 3 Shifts       06/04 0700 - 06/05 0659 06/05 0700 - 06/06 0659    P.O.  100    IV Piggyback 500 900    Total  Intake(mL/kg) 500 (9.5) 1000 (19)    Urine (mL/kg/hr) 1100 (0.9)     Drains 30 20    Stool 75     Total Output 1205 20    Net -705 +980                   Physical Exam:   Constitutional: She is oriented to person, place, and time. She appears well-developed and well-nourished. No distress.   Resting comfortably in bed       HENT:   Head: Normocephalic and atraumatic.    Eyes: Conjunctivae and EOM are normal.    Neck: Normal range of motion. Neck supple. No thyromegaly present.    Cardiovascular: Normal rate, regular rhythm and normal heart sounds.     Pulmonary/Chest: Effort normal and breath sounds normal. No respiratory distress.        Abdominal: Soft. She exhibits abdominal incision (c/d/i). She exhibits no distension. There is no tenderness.   Abdomen soft, non distended. Appropriate TTP    Ostomy site c/d/i with good output              Musculoskeletal: Normal range of motion and moves all extremeties. She exhibits no edema or tenderness.   MARI drain to right buttocks noted. C/d/i serous sanguinous drainage noted in drain, minimal ouptut          Neurological: She is alert and oriented to person, place, and time.    Skin: Skin is warm and dry. No rash noted.    Psychiatric: She has a normal mood and affect. Her behavior is normal.       Lines/Drains/Airways     Central Venous Catheter Line                 Port A Cath Single Lumen 02/01/17 0800 left subclavian 854 days          Drain                 Ileostomy RLQ -- days         Ileostomy 06/16/12 0000 RLQ 2546 days         Closed/Suction Drain 06/03/19 1510 Midline Back Bulb 8 Fr. 2 days                Laboratory:  BMP:   Recent Labs   Lab 06/05/19  0400     102     138   K 3.0*  3.0*     103   CO2 27  27   BUN 4*  4*   CREATININE 0.7  0.7   CALCIUM 8.9  8.9   , CBC:   Recent Labs   Lab 06/05/19  0400 06/06/19  0346   WBC 6.31 6.37   HGB 9.3* 9.9*   HCT 28.7* 29.6*   * 393*    and Urine Studies:   No results for input(s):  COLORU, APPEARANCEUA, PHUR, SPECGRAV, PROTEINUA, GLUCUA, KETONESU, BILIRUBINUA, OCCULTUA, NITRITE, UROBILINOGEN, LEUKOCYTESUR, RBCUA, WBCUA, BACTERIA, SQUAMEPITHEL, HYALINECASTS in the last 48 hours.    Invalid input(s): CARISSA    Diagnostic Results:  CT Abdomen Pelvis:   EXAMINATION:  CT ABDOMEN PELVIS WITH CONTRAST    CLINICAL HISTORY:  post operative fever of unknown origin. Rule out abscess or bowel injury. Do not have high clinical suspicion based off of physical exam;    TECHNIQUE:  The patient was surveyed from the lung bases through the pelvis after the administration of 75 cc Omni 350 IV contrast as well as 30 cc Omni 350 oral contrast. The data was reconstructed for coronal, sagittal, and axial images.    COMPARISON:  MRI 05/15/2019; CT 12/06/2018; ultrasound 04/09/2019.    FINDINGS:  There are bandlike opacities within the right lower lobe consistent with atelectasis..  No pleural effusion is seen.    Central venous catheter tip partially visualized at the cavoatrial junction.  The visualized portions of the heart appear normal. No pericardial effusion.    The liver is normal in size and attenuation.  No focal hepatic abnormality is seen. The gallbladder is unremarkable.  No intrahepatic or extrahepatic biliary ductal dilatation is identified. The spleen is unremarkable.    The stomach, pancreas, and adrenal glands are unremarkable.    The kidneys are normal in size and location. There is a stable right renal cyst and bilateral subcentimeter hypodensities, too small to characterize but likely additional cysts.  Bilateral renal calculi are present measuring up to 0.3 cm on the right and 0.5 cm on the left.  There is mild left hydroureteronephrosis.  The right ureter appears normal in course and caliber. The urinary bladder is unremarkable. The uterus is surgically absent.  There has been interval postsurgical changes of bilateral salpingo-oophorectomy.    There are postsurgical changes of colectomy with  right lower quadrant end ileostomy.  No bowel obstruction.  In this patient with a history of Crohn's disease, evaluation for active bowel inflammation is limited by the presence of radiopaque oral contrast.    There is scattered foci of intraperitoneal free air, likely related to recent surgery.  Small volume free fluid is present throughout the abdomen and pelvis.  There is a large, peripherally enhancing fluid collection within the pelvis measuring 9.6 x 6.4 cm containing small foci of free air (axial image 65).  Additional smaller fluid collection is noted posteriorly on the left in the deep pelvis (axial image 66).  Additional collection present in the mid abdomen demonstrating mild rim enhancement measuring 4.1 x 3.3 cm (axial image 47).  Additional interloop fluid collections identified adjacent to a segment of small bowel in the left hemiabdomen (axial images 41 and 47).  No enteric contrast extravasation to suggest bowel injury.    There is no evidence of lymph node enlargement in the abdomen or pelvis.  The abdominal aorta is normal in course and caliber without significant atherosclerotic calcifications.    The osseous structures demonstrate no significant abnormality.  The extraperitoneal soft tissues demonstrate a healing midline abdominal incision.      Impression       1. In this patient with a history of recent bilateral salpingo-oophorectomy, there are nonspecific rim enhancing fluid collections in the abdomen and pelvis as discussed above, worrisome for abscesses or other postoperative fluid collections.  2. Scattered foci of intraperitoneal free air and small volume abdominal and pelvic free fluid, likely postsurgical in etiology.  3. Mild left hydroureteronephrosis, not seen on prior studies.  Etiology of this finding is not definitively identified but could be related to pelvic inflammation.  Bilateral nonobstructing nephrolithiasis appear stable.  4. Postsurgical changes of hysterectomy and  colectomy with right lower quadrant ileostomy.  5. Multiple additional findings as above.  This report was flagged in Epic as abnormal.    Electronically signed by resident: Mono Angelo  Date: 06/02/2019  Time: 00:23    Electronically signed by: Gerardo Chambers MD  Date: 06/02/2019  Time: 04:11

## 2019-06-06 NOTE — CARE UPDATE
MD to bedside. IR drain successfully pulled. Gave IV dilaudid for pain control per patient request. Site looks c/d/i.     Beverly FRANCIS  PGY2

## 2019-06-06 NOTE — PLAN OF CARE
Problem: Adult Inpatient Plan of Care  Goal: Plan of Care Review  Outcome: Ongoing (interventions implemented as appropriate)  Pt AAO; independent. Vital signs stable and pt remained afebrile throughout shift. Receiving prn pain mes this shift. One time dose of klonopin given for anxiety given this shift .  Pts antibiotics dcd this shift.  Fluids dcd this shift. MARI drain dcd this shift. Bandaide  placed on site  .  Pt remained free from falls during shift.  Bed lowest position and locked.  Call light in reach.  WCTM

## 2019-06-06 NOTE — PLAN OF CARE
Problem: Adult Inpatient Plan of Care  Goal: Plan of Care Review  Outcome: Ongoing (interventions implemented as appropriate)  Plan of care reviewed with the patient at the beginning of shift. The patient is complaining of pain controlled with PO medications, IV pain medications administered once.. Complained of some anxiety about discharge and pain control. Slept for several hours during the night. VSS. Afebrile. Denying all other complaints. ABX continued. Bed in low locked position. Call bell within reach. Will continue to monitor.

## 2019-06-06 NOTE — PROGRESS NOTES
[facility]  Gynecologic Oncology  Progress Note      Patient Name: Ivy Salgado  MRN: [id number]  Admission Date: 5/30/2019  Hospital Length of Stay: 7 days  Attending Provider: Rupa German MD  Primary Care Provider: Kalia Astorga MD  Principal Problem: <principal problem not specified>    Follow-up For: Procedure(s) (LRB):  SALPINGO-OOPHORECTOMY, BILATERAL (Bilateral)  LAPAROTOMY, EXPLORATORY (N/A)  LYSIS, ADHESIONS (N/A)  Post-Operative Day: 7 Days Post-Op  Subjective:      History of Present Illness:  No notes on file    Hospital Course:  5/30/19 Presented for scheduled exlap/BSO/bilateral ovarian cystectomy with Dr. Parsons present for JUAN related to small bowel 2/2 to patient's history of Chron's, total colectomy, and ileostomy. Overall surgery went well, no complications, please see op note for details.  5/31/19. POD#1. Originally patient doing well, pain well controlled on PCA pump. By 11AM patient spiked temp of 103. Clinically patient did not appear toxic, abdomen was soft non distended. She was treated with tylenol and temp came down. She then became febrile again. WBC and LA ordered, both of which were normal. Overnight patient continued to spike temperatures to 102-103 with no resolution after giving tylenol. She was placed on cooling blanket. Blood cultures drawn, started on IV abx linezolid and zosyn. Eventually by morning temperature came down, she was afebrile. LA and WBC remained normal. Throughout this time patient was seen overnight and again in the morning, still clinically looked fair, did not appear toxic or septic. Abdomen remained soft and non distended.   6/1/19. POD#2. Afebrile this AM, continuing on IV abx. Did well throughout the day with po pain medication. Ambulating, voiding, tolerating po without difficulty. Overnight patient became febrile again to 100.9, blood culture ordered from port site. CT abdomen/pelvis with IV and po contrast ordered. Patient's  fever resolved by morning. Continue IV abx. Prelim blood cultures negative.   06/02/2019 POD#3 CT abdomen/pelvis concerning for possible abscess. Originally afebrile on rounding this AM however spiked temp to 103. Will consult IR for abscess drainage for source control of infection.   06/03/2019 POD#4 Had fever overnight, resolved with 1g tylenol. Overall fever curve decreasing. IR consulted about draining, tentatively scheduled for today. Will discuss with Dr. German.     Interval History:   Afebrile throughout the day yesterday. Tolerating regular diet without nausea/vomiting. Ambulating and voiding without difficulty. Pain better controlled today. Thinks may be having hot flashes. Interested in HRT.     Scheduled Meds:   cephALEXin  250 mg Oral QHS    dronabinol  5 mg Oral BID WM    famotidine  20 mg Oral BID    linezolid 600mg/300ml  600 mg Intravenous Q12H    mirtazapine  30 mg Oral QHS    piperacillin-tazobactam (ZOSYN) IVPB  4.5 g Intravenous Q8H    valACYclovir  500 mg Oral Daily     Continuous Infusions:    PRN Meds:calcium carbonate, clonazePAM, diphenhydrAMINE, diphenhydrAMINE, HYDROmorphone, iohexol, loperamide, ondansetron, oxyCODONE-acetaminophen, oxyCODONE-acetaminophen, promethazine (PHENERGAN) IVPB, zolpidem    Review of patient's allergies indicates:   Allergen Reactions    Azathioprine sodium Other (See Comments)     Other reaction(s): pancreatitis  Other reaction(s): pancreatitis    Methotrexate analogues Other (See Comments)     leukopenia    Stelara [ustekinumab] Other (See Comments)     Multiple infections    Zofran [ondansetron hcl (pf)] Other (See Comments)     Per patient causes prolong QT    Vancomycin analogues Hives    Morphine Itching and Other (See Comments)     Other reaction(s): Itching    Bactrim [sulfamethoxazole-trimethoprim] Palpitations    Ciprofloxacin Palpitations       Objective:     Vital Signs (Most Recent):  Temp: 98.9 °F (37.2 °C) (06/06/19 0352)  Pulse: 88  (06/06/19 0352)  Resp: 16 (06/06/19 0352)  BP: 129/76 (06/06/19 0352)  SpO2: 98 % (06/06/19 0352) Vital Signs (24h Range):  Temp:  [98.9 °F (37.2 °C)-100.1 °F (37.8 °C)] 98.9 °F (37.2 °C)  Pulse:  [] 88  Resp:  [16-18] 16  SpO2:  [94 %-99 %] 98 %  BP: (129-145)/(76-94) 129/76     Weight: 52.6 kg (116 lb)  Body mass index is 21.92 kg/m².    Intake/Output - Last 3 Shifts       06/04 0700 - 06/05 0659 06/05 0700 - 06/06 0659    P.O.  100    IV Piggyback 500 900    Total Intake(mL/kg) 500 (9.5) 1000 (19)    Urine (mL/kg/hr) 1100 (0.9)     Drains 30 20    Stool 75     Total Output 1205 20    Net -705 +980                   Physical Exam:   Constitutional: She is oriented to person, place, and time. She appears well-developed and well-nourished. No distress.   Resting comfortably in bed       HENT:   Head: Normocephalic and atraumatic.    Eyes: Conjunctivae and EOM are normal.    Neck: Normal range of motion. Neck supple. No thyromegaly present.    Cardiovascular: Normal rate, regular rhythm and normal heart sounds.     Pulmonary/Chest: Effort normal and breath sounds normal. No respiratory distress.        Abdominal: Soft. She exhibits abdominal incision (c/d/i). She exhibits no distension. There is no tenderness.   Abdomen soft, non distended. Appropriate TTP    Ostomy site c/d/i with good output              Musculoskeletal: Normal range of motion and moves all extremeties. She exhibits no edema or tenderness.   MARI drain to right buttocks noted. C/d/i serous sanguinous drainage noted in drain, minimal ouptut          Neurological: She is alert and oriented to person, place, and time.    Skin: Skin is warm and dry. No rash noted.    Psychiatric: She has a normal mood and affect. Her behavior is normal.       Lines/Drains/Airways     Central Venous Catheter Line                 Port A Cath Single Lumen 02/01/17 0800 left subclavian 854 days          Drain                 Ileostomy RLQ -- days         Ileostomy  06/16/12 0000 RLQ 2546 days         Closed/Suction Drain 06/03/19 1510 Midline Back Bulb 8 Fr. 2 days                Laboratory:  BMP:   Recent Labs   Lab 06/05/19  0400     102     138   K 3.0*  3.0*     103   CO2 27  27   BUN 4*  4*   CREATININE 0.7  0.7   CALCIUM 8.9  8.9   , CBC:   Recent Labs   Lab 06/05/19  0400 06/06/19  0346   WBC 6.31 6.37   HGB 9.3* 9.9*   HCT 28.7* 29.6*   * 393*    and Urine Studies:   No results for input(s): COLORU, APPEARANCEUA, PHUR, SPECGRAV, PROTEINUA, GLUCUA, KETONESU, BILIRUBINUA, OCCULTUA, NITRITE, UROBILINOGEN, LEUKOCYTESUR, RBCUA, WBCUA, BACTERIA, SQUAMEPITHEL, HYALINECASTS in the last 48 hours.    Invalid input(s): WRIGHTSUR    Diagnostic Results:  CT Abdomen Pelvis:   EXAMINATION:  CT ABDOMEN PELVIS WITH CONTRAST    CLINICAL HISTORY:  post operative fever of unknown origin. Rule out abscess or bowel injury. Do not have high clinical suspicion based off of physical exam;    TECHNIQUE:  The patient was surveyed from the lung bases through the pelvis after the administration of 75 cc Omni 350 IV contrast as well as 30 cc Omni 350 oral contrast. The data was reconstructed for coronal, sagittal, and axial images.    COMPARISON:  MRI 05/15/2019; CT 12/06/2018; ultrasound 04/09/2019.    FINDINGS:  There are bandlike opacities within the right lower lobe consistent with atelectasis..  No pleural effusion is seen.    Central venous catheter tip partially visualized at the cavoatrial junction.  The visualized portions of the heart appear normal. No pericardial effusion.    The liver is normal in size and attenuation.  No focal hepatic abnormality is seen. The gallbladder is unremarkable.  No intrahepatic or extrahepatic biliary ductal dilatation is identified. The spleen is unremarkable.    The stomach, pancreas, and adrenal glands are unremarkable.    The kidneys are normal in size and location. There is a stable right renal cyst and bilateral  subcentimeter hypodensities, too small to characterize but likely additional cysts.  Bilateral renal calculi are present measuring up to 0.3 cm on the right and 0.5 cm on the left.  There is mild left hydroureteronephrosis.  The right ureter appears normal in course and caliber. The urinary bladder is unremarkable. The uterus is surgically absent.  There has been interval postsurgical changes of bilateral salpingo-oophorectomy.    There are postsurgical changes of colectomy with right lower quadrant end ileostomy.  No bowel obstruction.  In this patient with a history of Crohn's disease, evaluation for active bowel inflammation is limited by the presence of radiopaque oral contrast.    There is scattered foci of intraperitoneal free air, likely related to recent surgery.  Small volume free fluid is present throughout the abdomen and pelvis.  There is a large, peripherally enhancing fluid collection within the pelvis measuring 9.6 x 6.4 cm containing small foci of free air (axial image 65).  Additional smaller fluid collection is noted posteriorly on the left in the deep pelvis (axial image 66).  Additional collection present in the mid abdomen demonstrating mild rim enhancement measuring 4.1 x 3.3 cm (axial image 47).  Additional interloop fluid collections identified adjacent to a segment of small bowel in the left hemiabdomen (axial images 41 and 47).  No enteric contrast extravasation to suggest bowel injury.    There is no evidence of lymph node enlargement in the abdomen or pelvis.  The abdominal aorta is normal in course and caliber without significant atherosclerotic calcifications.    The osseous structures demonstrate no significant abnormality.  The extraperitoneal soft tissues demonstrate a healing midline abdominal incision.      Impression       1. In this patient with a history of recent bilateral salpingo-oophorectomy, there are nonspecific rim enhancing fluid collections in the abdomen and pelvis as  discussed above, worrisome for abscesses or other postoperative fluid collections.  2. Scattered foci of intraperitoneal free air and small volume abdominal and pelvic free fluid, likely postsurgical in etiology.  3. Mild left hydroureteronephrosis, not seen on prior studies.  Etiology of this finding is not definitively identified but could be related to pelvic inflammation.  Bilateral nonobstructing nephrolithiasis appear stable.  4. Postsurgical changes of hysterectomy and colectomy with right lower quadrant ileostomy.  5. Multiple additional findings as above.  This report was flagged in Epic as abnormal.    Electronically signed by resident: Mono Angelo  Date: 06/02/2019  Time: 00:23    Electronically signed by: Gerardo Chambers MD  Date: 06/02/2019  Time: 04:11            Assessment/Plan:     POD#7  s/p ExLap/BSO/JUAN  - Doing well   - Tolerating po without n/v  - good ostomy output   - ambulating and voiding without difficulty  - post op H/H stable 10/34  - doing well with po pain medication  - complaining of hot flashes, discussed HRT. Interested in starting.   - SCDs for DVT ppx.   - will work with  for medication coverage through insurance      Post operative Fever:  - has been afebrile for over 48 hours   - blood cultures negative  - did have 100.6 temp on preop visit prior to surgery, possible underlying etiology   - LA 1.1>0.9  - WBC 11>16>14>7>6>6  - overall fever curve improving and WBC improving   - continue abx zosyn and linezolid (patient allergic to vancomycin), plan to keep on for at least 48 hours afebrile. Will d/c this AM  - POD#3 s/p IR drainage. Cultures still pending  - less than 20cc drain output overnight. Will pull this AM  - patient thinks developing yeast infection 2/2 to abx. Will give diflucan         Crohn's  - S/P total colectomy  - Avoid NSAIDs  - Monitor for ileostomy output         Depression/Anxiety  - Continue home clonaxepam   - Continue remeron  - continue home  opal        HTN  - No meds  - Monitor BP closely        GERD  - Continue zantac BID      HSV  - continue home valtrex     VTE Risk Mitigation (From admission, onward)        Ordered     Place sequential compression device  Until discontinued      05/30/19 0545     Place JEANNA hose  Until discontinued      05/30/19 0545          *Plan for discharge home today*    Beverly Fishman MD  Gynecologic Oncology  [facility]

## 2019-06-07 VITALS
SYSTOLIC BLOOD PRESSURE: 117 MMHG | TEMPERATURE: 99 F | HEART RATE: 86 BPM | HEIGHT: 61 IN | OXYGEN SATURATION: 99 % | DIASTOLIC BLOOD PRESSURE: 83 MMHG | RESPIRATION RATE: 16 BRPM | BODY MASS INDEX: 21.52 KG/M2 | WEIGHT: 114 LBS

## 2019-06-07 DIAGNOSIS — M85.9 LOW BONE DENSITY: Primary | ICD-10-CM

## 2019-06-07 LAB
BACTERIA BLD CULT: NORMAL
BASOPHILS # BLD AUTO: 0.03 K/UL (ref 0–0.2)
BASOPHILS NFR BLD: 0.5 % (ref 0–1.9)
DIFFERENTIAL METHOD: ABNORMAL
EOSINOPHIL # BLD AUTO: 0.4 K/UL (ref 0–0.5)
EOSINOPHIL NFR BLD: 5.8 % (ref 0–8)
ERYTHROCYTE [DISTWIDTH] IN BLOOD BY AUTOMATED COUNT: 14.7 % (ref 11.5–14.5)
HCT VFR BLD AUTO: 28.2 % (ref 37–48.5)
HGB BLD-MCNC: 9 G/DL (ref 12–16)
IMM GRANULOCYTES # BLD AUTO: 0.03 K/UL (ref 0–0.04)
IMM GRANULOCYTES NFR BLD AUTO: 0.5 % (ref 0–0.5)
LYMPHOCYTES # BLD AUTO: 2.6 K/UL (ref 1–4.8)
LYMPHOCYTES NFR BLD: 42.1 % (ref 18–48)
MCH RBC QN AUTO: 28.3 PG (ref 27–31)
MCHC RBC AUTO-ENTMCNC: 31.9 G/DL (ref 32–36)
MCV RBC AUTO: 89 FL (ref 82–98)
MONOCYTES # BLD AUTO: 0.8 K/UL (ref 0.3–1)
MONOCYTES NFR BLD: 12.1 % (ref 4–15)
NEUTROPHILS # BLD AUTO: 2.4 K/UL (ref 1.8–7.7)
NEUTROPHILS NFR BLD: 39 % (ref 38–73)
NRBC BLD-RTO: 0 /100 WBC
PLATELET # BLD AUTO: 401 K/UL (ref 150–350)
PMV BLD AUTO: 8.9 FL (ref 9.2–12.9)
RBC # BLD AUTO: 3.18 M/UL (ref 4–5.4)
WBC # BLD AUTO: 6.2 K/UL (ref 3.9–12.7)

## 2019-06-07 PROCEDURE — 63600175 PHARM REV CODE 636 W HCPCS: Performed by: OBSTETRICS & GYNECOLOGY

## 2019-06-07 PROCEDURE — 85025 COMPLETE CBC W/AUTO DIFF WBC: CPT

## 2019-06-07 PROCEDURE — 25000003 PHARM REV CODE 250: Performed by: OBSTETRICS & GYNECOLOGY

## 2019-06-07 PROCEDURE — 63600175 PHARM REV CODE 636 W HCPCS: Performed by: STUDENT IN AN ORGANIZED HEALTH CARE EDUCATION/TRAINING PROGRAM

## 2019-06-07 PROCEDURE — 99024 PR POST-OP FOLLOW-UP VISIT: ICD-10-PCS | Mod: ,,, | Performed by: OBSTETRICS & GYNECOLOGY

## 2019-06-07 PROCEDURE — 25000003 PHARM REV CODE 250: Performed by: STUDENT IN AN ORGANIZED HEALTH CARE EDUCATION/TRAINING PROGRAM

## 2019-06-07 PROCEDURE — 63600175 PHARM REV CODE 636 W HCPCS

## 2019-06-07 PROCEDURE — 99024 POSTOP FOLLOW-UP VISIT: CPT | Mod: ,,, | Performed by: OBSTETRICS & GYNECOLOGY

## 2019-06-07 PROCEDURE — G0378 HOSPITAL OBSERVATION PER HR: HCPCS

## 2019-06-07 RX ORDER — ESTRADIOL 0.05 MG/D
1 PATCH TRANSDERMAL
Qty: 4 PATCH | Refills: 11 | Status: SHIPPED | OUTPATIENT
Start: 2019-06-13 | End: 2019-06-25

## 2019-06-07 RX ORDER — CLINDAMYCIN PHOSPHATE 20 MG/G
CREAM VAGINAL
Qty: 40 G | Refills: 0 | Status: SHIPPED | OUTPATIENT
Start: 2019-06-07 | End: 2019-06-11

## 2019-06-07 RX ORDER — ESTRADIOL 0.05 MG/D
1 PATCH TRANSDERMAL
Qty: 4 PATCH | Refills: 11 | Status: SHIPPED | OUTPATIENT
Start: 2019-06-13 | End: 2019-06-07

## 2019-06-07 RX ORDER — HEPARIN 100 UNIT/ML
3 SYRINGE INTRAVENOUS ONCE
Status: COMPLETED | OUTPATIENT
Start: 2019-06-07 | End: 2019-06-07

## 2019-06-07 RX ORDER — OXYCODONE AND ACETAMINOPHEN 10; 325 MG/1; MG/1
1 TABLET ORAL EVERY 4 HOURS PRN
Qty: 40 TABLET | Refills: 0 | Status: SHIPPED | OUTPATIENT
Start: 2019-06-07 | End: 2019-06-11 | Stop reason: SDUPTHER

## 2019-06-07 RX ORDER — TERCONAZOLE 4 MG/G
CREAM VAGINAL
Qty: 45 G | Refills: 0 | Status: SHIPPED | OUTPATIENT
Start: 2019-06-07 | End: 2019-06-11

## 2019-06-07 RX ORDER — FLUCONAZOLE 150 MG/1
150 TABLET ORAL ONCE
Qty: 1 TABLET | Refills: 0 | Status: SHIPPED | OUTPATIENT
Start: 2019-06-07 | End: 2019-06-07

## 2019-06-07 RX ADMIN — VALACYCLOVIR HYDROCHLORIDE 500 MG: 500 TABLET, FILM COATED ORAL at 08:06

## 2019-06-07 RX ADMIN — OXYCODONE AND ACETAMINOPHEN 1 TABLET: 10; 325 TABLET ORAL at 08:06

## 2019-06-07 RX ADMIN — OXYCODONE AND ACETAMINOPHEN 1 TABLET: 10; 325 TABLET ORAL at 02:06

## 2019-06-07 RX ADMIN — HYDROMORPHONE HYDROCHLORIDE 2 MG: 1 INJECTION, SOLUTION INTRAMUSCULAR; INTRAVENOUS; SUBCUTANEOUS at 05:06

## 2019-06-07 RX ADMIN — DRONABINOL 5 MG: 2.5 CAPSULE ORAL at 08:06

## 2019-06-07 RX ADMIN — CLONAZEPAM 1 MG: 1 TABLET ORAL at 02:06

## 2019-06-07 RX ADMIN — DRONABINOL 5 MG: 2.5 CAPSULE ORAL at 05:06

## 2019-06-07 RX ADMIN — HYDROMORPHONE HYDROCHLORIDE 2 MG: 1 INJECTION, SOLUTION INTRAMUSCULAR; INTRAVENOUS; SUBCUTANEOUS at 10:06

## 2019-06-07 RX ADMIN — HEPARIN 300 UNITS: 100 SYRINGE at 07:06

## 2019-06-07 RX ADMIN — FAMOTIDINE 20 MG: 20 TABLET, FILM COATED ORAL at 08:06

## 2019-06-07 RX ADMIN — OXYCODONE AND ACETAMINOPHEN 1 TABLET: 10; 325 TABLET ORAL at 03:06

## 2019-06-07 NOTE — PLAN OF CARE
POD#8 s/p ExLap/BSO/JUAN. Plans for possible discharge later today. CM discussed the patient with the MD and the SW. Patient may need assistance with medication coverage. CM requested that the physician send all prescriptions to [facility] for processing. Patient has Medicaid.SW aware that patient may need prescription assistance. Patient has been afebrile x 48 hours. IV antibiotics were discontinued. Patient is also POD#4 s/p IR drainage. Cultures still pending. IR drain pulled yesterday. Bedside delivery selected for any medications needed at discharge. Follow-up scheduled as listed below. CM to continue to follow with the team.    Future Appointments   Date Time Provider Department Center   6/11/2019  1:15 PM Rupa German MD [facility]   7/1/2019 11:00 AM Parish Ross MD [facility] GASTR[facility] [address]   7/9/2019  9:00 AM NON-CHEMO 02 [facility]MO [facility]   9/30/2019 12:00 PM Phu Obrien MD [facility] UROLOGY [facility]        06/07/19 0840   Final Note   Assessment Type Final Discharge Note   Anticipated Discharge Disposition Home   What phone number can be called within the next 1-3 days to see how you are doing after discharge?   ([phone])   Hospital Follow Up  Appt(s) scheduled? Yes   Discharge plans and expectations educations in teach back method with documentation complete? Yes  (per bedside nurse)

## 2019-06-07 NOTE — SUBJECTIVE & OBJECTIVE
Interval History:   Afebrile. Tolerating regular diet without nausea/vomiting. Ambulating and voiding without difficulty. Pain better controlled today. Started estrogen patch.     Scheduled Meds:   cephALEXin  250 mg Oral QHS    dronabinol  5 mg Oral BID WM    estradiol 0.05 mg/24 hr td ptwk  1 patch Transdermal Q7 Days    famotidine  20 mg Oral BID    mirtazapine  30 mg Oral QHS    valACYclovir  500 mg Oral Daily     Continuous Infusions:    PRN Meds:calcium carbonate, clonazePAM, diphenhydrAMINE, diphenhydrAMINE, HYDROmorphone, iohexol, loperamide, ondansetron, oxyCODONE-acetaminophen, oxyCODONE-acetaminophen, promethazine (PHENERGAN) IVPB, zolpidem    Review of patient's allergies indicates:   Allergen Reactions    Azathioprine sodium Other (See Comments)     Other reaction(s): pancreatitis  Other reaction(s): pancreatitis    Methotrexate analogues Other (See Comments)     leukopenia    Stelara [ustekinumab] Other (See Comments)     Multiple infections    Zofran [ondansetron hcl (pf)] Other (See Comments)     Per patient causes prolong QT    Vancomycin analogues Hives    Morphine Itching and Other (See Comments)     Other reaction(s): Itching    Bactrim [sulfamethoxazole-trimethoprim] Palpitations    Ciprofloxacin Palpitations       Objective:     Vital Signs (Most Recent):  Temp: 98.4 °F (36.9 °C) (06/07/19 0348)  Pulse: 99 (06/07/19 0348)  Resp: 16 (06/07/19 0348)  BP: 112/72 (06/07/19 0348)  SpO2: 95 % (06/07/19 0348) Vital Signs (24h Range):  Temp:  [98.4 °F (36.9 °C)-99.6 °F (37.6 °C)] 98.4 °F (36.9 °C)  Pulse:  [] 99  Resp:  [16] 16  SpO2:  [95 %-99 %] 95 %  BP: (112-141)/(72-83) 112/72     Weight: 51.7 kg (113 lb 15.7 oz)  Body mass index is 21.54 kg/m².    Intake/Output - Last 3 Shifts       06/05 0700 - 06/06 0659 06/06 0700 - 06/07 0659 06/07 0700 - 06/08 0659    P.O. 100      IV Piggyback 900      Total Intake(mL/kg) 1000 (19)      Urine (mL/kg/hr)  0 (0)     Drains 27.5 2.5      Stool  350     Total Output 27.5 352.5     Net +972.5 -352.5            Urine Occurrence  4 x              Physical Exam:   Constitutional: She is oriented to person, place, and time. She appears well-developed and well-nourished. No distress.   Resting comfortably in bed       HENT:   Head: Normocephalic and atraumatic.    Eyes: Conjunctivae and EOM are normal.    Neck: Normal range of motion. Neck supple. No thyromegaly present.    Cardiovascular: Normal rate, regular rhythm and normal heart sounds.     Pulmonary/Chest: Effort normal and breath sounds normal. No respiratory distress.        Abdominal: Soft. She exhibits abdominal incision (c/d/i). She exhibits no distension. There is no tenderness.   Abdomen soft, non distended. Appropriate TTP    Ostomy site c/d/i with good output              Musculoskeletal: Normal range of motion and moves all extremeties. She exhibits no edema or tenderness.       Neurological: She is alert and oriented to person, place, and time.    Skin: Skin is warm and dry. No rash noted.    Psychiatric: She has a normal mood and affect. Her behavior is normal.       Lines/Drains/Airways     Central Venous Catheter Line                 Port A Cath Single Lumen 02/01/17 0800 left subclavian 855 days          Drain                 Ileostomy RLQ -- days         Ileostomy 06/16/12 0000 RLQ 2547 days         Closed/Suction Drain 06/03/19 1510 Midline Back Bulb 8 Fr. 3 days                Laboratory:  BMP:   No results for input(s): GLU, NA, K, CL, CO2, BUN, CREATININE, CALCIUM, MG in the last 48 hours., CBC:   Recent Labs   Lab 06/06/19  0346 06/07/19  0345   WBC 6.37 6.20   HGB 9.9* 9.0*   HCT 29.6* 28.2*   * 401*    and Urine Studies:   No results for input(s): COLORU, APPEARANCEUA, PHUR, SPECGRAV, PROTEINUA, GLUCUA, KETONESU, BILIRUBINUA, OCCULTUA, NITRITE, UROBILINOGEN, LEUKOCYTESUR, RBCUA, WBCUA, BACTERIA, SQUAMEPITHEL, HYALINECASTS in the last 48 hours.    Invalid input(s):  WRIGHTSUR    Diagnostic Results:  CT Abdomen Pelvis:   EXAMINATION:  CT ABDOMEN PELVIS WITH CONTRAST    CLINICAL HISTORY:  post operative fever of unknown origin. Rule out abscess or bowel injury. Do not have high clinical suspicion based off of physical exam;    TECHNIQUE:  The patient was surveyed from the lung bases through the pelvis after the administration of 75 cc Omni 350 IV contrast as well as 30 cc Omni 350 oral contrast. The data was reconstructed for coronal, sagittal, and axial images.    COMPARISON:  MRI 05/15/2019; CT 12/06/2018; ultrasound 04/09/2019.    FINDINGS:  There are bandlike opacities within the right lower lobe consistent with atelectasis..  No pleural effusion is seen.    Central venous catheter tip partially visualized at the cavoatrial junction.  The visualized portions of the heart appear normal. No pericardial effusion.    The liver is normal in size and attenuation.  No focal hepatic abnormality is seen. The gallbladder is unremarkable.  No intrahepatic or extrahepatic biliary ductal dilatation is identified. The spleen is unremarkable.    The stomach, pancreas, and adrenal glands are unremarkable.    The kidneys are normal in size and location. There is a stable right renal cyst and bilateral subcentimeter hypodensities, too small to characterize but likely additional cysts.  Bilateral renal calculi are present measuring up to 0.3 cm on the right and 0.5 cm on the left.  There is mild left hydroureteronephrosis.  The right ureter appears normal in course and caliber. The urinary bladder is unremarkable. The uterus is surgically absent.  There has been interval postsurgical changes of bilateral salpingo-oophorectomy.    There are postsurgical changes of colectomy with right lower quadrant end ileostomy.  No bowel obstruction.  In this patient with a history of Crohn's disease, evaluation for active bowel inflammation is limited by the presence of radiopaque oral contrast.    There is  scattered foci of intraperitoneal free air, likely related to recent surgery.  Small volume free fluid is present throughout the abdomen and pelvis.  There is a large, peripherally enhancing fluid collection within the pelvis measuring 9.6 x 6.4 cm containing small foci of free air (axial image 65).  Additional smaller fluid collection is noted posteriorly on the left in the deep pelvis (axial image 66).  Additional collection present in the mid abdomen demonstrating mild rim enhancement measuring 4.1 x 3.3 cm (axial image 47).  Additional interloop fluid collections identified adjacent to a segment of small bowel in the left hemiabdomen (axial images 41 and 47).  No enteric contrast extravasation to suggest bowel injury.    There is no evidence of lymph node enlargement in the abdomen or pelvis.  The abdominal aorta is normal in course and caliber without significant atherosclerotic calcifications.    The osseous structures demonstrate no significant abnormality.  The extraperitoneal soft tissues demonstrate a healing midline abdominal incision.      Impression       1. In this patient with a history of recent bilateral salpingo-oophorectomy, there are nonspecific rim enhancing fluid collections in the abdomen and pelvis as discussed above, worrisome for abscesses or other postoperative fluid collections.  2. Scattered foci of intraperitoneal free air and small volume abdominal and pelvic free fluid, likely postsurgical in etiology.  3. Mild left hydroureteronephrosis, not seen on prior studies.  Etiology of this finding is not definitively identified but could be related to pelvic inflammation.  Bilateral nonobstructing nephrolithiasis appear stable.  4. Postsurgical changes of hysterectomy and colectomy with right lower quadrant ileostomy.  5. Multiple additional findings as above.  This report was flagged in Epic as abnormal.    Electronically signed by resident: Mono Angelo  Date: 06/02/2019  Time:  00:23    Electronically signed by: Gerardo Chambers MD  Date: 06/02/2019  Time: 04:11

## 2019-06-07 NOTE — DISCHARGE SUMMARY
[facility]  Gynecologic Oncology  Discharge Summary    Patient Name: Ivy Salgado  MRN: [id number]  Admission Date: 5/30/2019  Hospital Length of Stay: 8 days  Discharge Date and Time:  06/07/2019 3:29 PM  Attending Physician: Rupa German MD   Discharging Provider: Beverly Fishman MD  Primary Care Provider: Kalia Astorga MD    HPI:   No notes on file    Hospital Course:  5/30/19 Presented for scheduled exlap/BSO/bilateral ovarian cystectomy with Dr. Parsons present for JUAN related to small bowel 2/2 to patient's history of Chron's, total colectomy, and ileostomy. Overall surgery went well, no complications, please see op note for details.  5/31/19. POD#1. Originally patient doing well, pain well controlled on PCA pump. By 11AM patient spiked temp of 103. Clinically patient did not appear toxic, abdomen was soft non distended. She was treated with tylenol and temp came down. She then became febrile again. WBC and LA ordered, both of which were normal. Overnight patient continued to spike temperatures to 102-103 with no resolution after giving tylenol. She was placed on cooling blanket. Blood cultures drawn, started on IV abx linezolid and zosyn. Eventually by morning temperature came down, she was afebrile. LA and WBC remained normal. Throughout this time patient was seen overnight and again in the morning, still clinically looked fair, did not appear toxic or septic. Abdomen remained soft and non distended.   6/1/19. POD#2. Afebrile this AM, continuing on IV abx. Did well throughout the day with po pain medication. Ambulating, voiding, tolerating po without difficulty. Overnight patient became febrile again to 100.9, blood culture ordered from port site. CT abdomen/pelvis with IV and po contrast ordered. Patient's fever resolved by morning. Continue IV abx. Prelim blood cultures negative.   06/02/2019 POD#3 CT abdomen/pelvis concerning for possible abscess. Originally afebrile on  rounding this AM however spiked temp to 103. Will consult IR for abscess drainage for source control of infection.   06/03/2019 POD#4 Had fever overnight, resolved with 1g tylenol. Overall fever curve decreasing. IR consulted about draining site. IR placed drain in left buttocks to pelvic fluid collection pocket, initially drained 30cc of fluid.   6/4/19 POD#5 from exlap/BSO, POD#1 s/p IR drain placement. Fever curve improving. WBC  6. Overall greatly improving. Remained afebrile for 24 hours.   6/5-6/6. Over 48 hours afebrile. IV abx discontinued. Current IR cultures show no growth. Started HRT 2/2 to hot flashes. Diflucan given for yeast infection. Drained pulled on POD#3 s/p drain placement, draining less than 20cc daily.   6/7/19. Remained afebrile. Pain well controlled. Pathology benign. Stable for discharge home.     Procedure(s) (LRB):  SALPINGO-OOPHORECTOMY, BILATERAL (Bilateral)  LAPAROTOMY, EXPLORATORY (N/A)  LYSIS, ADHESIONS (N/A)     Consults (From admission, onward)        Status Ordering Provider     Inpatient consult to Interventional Radiology  Once     Provider:  (Not yet assigned)    Completed JANICE ARGUETA     Inpatient consult to Oncology Social Work  Once     Provider:  (Not yet assigned)    Acknowledged JANICE ARGUETA          Significant Diagnostic Studies: Labs: BMP: No results for input(s): GLU, NA, K, CL, CO2, BUN, CREATININE, CALCIUM, MG in the last 48 hours. and CMP No results for input(s): NA, K, CL, CO2, GLU, BUN, CREATININE, CALCIUM, PROT, ALBUMIN, BILITOT, ALKPHOS, AST, ALT, ANIONGAP, ESTGFRAFRICA, EGFRNONAA in the last 48 hours.    Pending Diagnostic Studies:     None        Final Active Diagnoses:    Diagnosis Date Noted POA    S/P ExLap, BSO, 5/30/19 [Z90.722] 05/30/2019 Not Applicable    Abdominal pain [R10.9] 10/12/2016 Yes    Generalized anxiety disorder [F41.1] 09/18/2013 Yes    Pelvic mass [R19.00] 10/29/2011 Yes      Problems Resolved During this Admission:        Does  this patient meet criteria for extended DVT prophylaxis? No, because does not have cancer    Discharged Condition: good    Disposition: Home or Self Care    Follow Up:  Follow-up Information     Rupa German MD.    Specialty:  Gynecologic Oncology  Why:  As scheduled by the clinic  Contact information:  [address]  [facility] [address]  [phone]                 Patient Instructions:      No driving until:   Order Comments: Not taking narcotics     Other restrictions (specify):   Order Comments: Pelvic rest until cleared by MD. Nothing in vagina till cleared by MD including tampons, douching, intercourse.     Notify your health care provider if you experience any of the following:  temperature >100.4     Notify your health care provider if you experience any of the following:  persistent nausea and vomiting or diarrhea     Notify your health care provider if you experience any of the following:  severe uncontrolled pain     Notify your health care provider if you experience any of the following:  redness, tenderness, or signs of infection (pain, swelling, redness, odor or green/yellow discharge around incision site)     Notify your health care provider if you experience any of the following:  difficulty breathing or increased cough     Notify your health care provider if you experience any of the following:  severe persistent headache     Notify your health care provider if you experience any of the following:  worsening rash     Notify your health care provider if you experience any of the following:  persistent dizziness, light-headedness, or visual disturbances     Notify your health care provider if you experience any of the following:  increased confusion or weakness     Notify your health care provider if you experience any of the following:   Order Comments: Excessive vaginal bleeding     Activity as tolerated     Medications:  Reconciled Home Medications:      Medication List      START taking  these medications    estradiol 0.05 mg/24 hr td ptwk 0.05 mg/24 hr Ptwk  Place 1 patch onto the skin every 7 days.  Start taking on:  6/13/2019     fluconazole 150 MG Tab  Commonly known as:  DIFLUCAN  Take 1 tablet (150 mg total) by mouth once. for 1 dose        CHANGE how you take these medications    food supplemt, lactose-reduced 0.04-1.05 gram-kcal/mL Liqd  Commonly known as:  BOOST BREEZE NUTRITIONAL  Use 3 times per day  What changed:  additional instructions     * oxyCODONE-acetaminophen  mg per tablet  Commonly known as:  PERCOCET  Take 1 tablet by mouth every 6 (six) hours as needed for Pain.  What changed:  how much to take     * oxyCODONE-acetaminophen  mg per tablet  Commonly known as:  PERCOCET  Take 1 tablet by mouth every 4 (four) hours as needed.  What changed:  You were already taking a medication with the same name, and this prescription was added. Make sure you understand how and when to take each.         * This list has 2 medication(s) that are the same as other medications prescribed for you. Read the directions carefully, and ask your doctor or other care provider to review them with you.            CONTINUE taking these medications    cephALEXin 250 MG capsule  Commonly known as:  KEFLEX  Take 1 capsule (250 mg total) by mouth once daily.     * clonazePAM 1 MG tablet  Commonly known as:  KLONOPIN  Take 1 tablet (1 mg total) by mouth 2 (two) times daily as needed.     * clonazePAM 1 MG tablet  Commonly known as:  KLONOPIN  TAKE 1 TABLET BY MOUTH TWICE DAILY AS NEEDED FOR ANXIETY     diphenoxylate-atropine 2.5-0.025 mg 2.5-0.025 mg per tablet  Commonly known as:  LOMOTIL  Take 1 tablet by mouth 4 (four) times daily as needed. for diarrhea     dronabinol 5 MG capsule  Commonly known as:  MARINOL  Take 1 capsule (5 mg total) by mouth 2 (two) times daily before meals.     ibuprofen 400 MG tablet  Commonly known as:  ADVIL,MOTRIN  Take 400 mg by mouth daily as needed (pain).      loperamide 2 mg capsule  Commonly known as:  IMODIUM  Take 2 mg by mouth daily as needed for Diarrhea.     mirtazapine 30 MG disintegrating tablet  Commonly known as:  REMERON SOL-TAB  Take 1 tablet (30 mg total) by mouth every evening.     ONE DAILY MULTIVITAMIN per tablet  Generic drug:  multivitamin  Take 1 tablet by mouth once daily.     potassium citrate 15 mEq Tbsr  Commonly known as:  UROCIT-K 15  Take 2 tablets by mouth 2 (two) times daily.     promethazine 25 MG tablet  Commonly known as:  PHENERGAN  Take 1 tablet (25 mg total) by mouth every 6 (six) hours as needed.     ranitidine 150 MG tablet  Commonly known as:  ZANTAC  Take 150 mg by mouth 2 (two) times daily as needed for Heartburn.     terconazole 0.4 % Crea  Commonly known as:  TERAZOL 7  INSERT ONE APPLICATORFUL VAGINALLY AT BEDTIME     valACYclovir 500 MG tablet  Commonly known as:  VALTREX  TAKE 1 TABLET BY MOUTH EVERY DAY     zolpidem 10 mg Tab  Commonly known as:  AMBIEN  TAKE 1 TABLET BY MOUTH EVERY EVENING         * This list has 2 medication(s) that are the same as other medications prescribed for you. Read the directions carefully, and ask your doctor or other care provider to review them with you.            ASK your doctor about these medications    clindamycin 2 % vaginal cream  Commonly known as:  CLINDESSE  PLACE VAGINALLY EVERY EVENING            Beverly Fishman MD  Gynecologic Oncology  [facility]

## 2019-06-07 NOTE — PROGRESS NOTES
[facility]  Gynecologic Oncology  Progress Note      Patient Name: Ivy Salgado  MRN: [id number]  Admission Date: 5/30/2019  Hospital Length of Stay: 8 days  Attending Provider: Rupa German MD  Primary Care Provider: Kalia Astorga MD  Principal Problem: <principal problem not specified>    Follow-up For: Procedure(s) (LRB):  SALPINGO-OOPHORECTOMY, BILATERAL (Bilateral)  LAPAROTOMY, EXPLORATORY (N/A)  LYSIS, ADHESIONS (N/A)  Post-Operative Day: 8 Days Post-Op  Subjective:      History of Present Illness:  No notes on file    Hospital Course:  5/30/19 Presented for scheduled exlap/BSO/bilateral ovarian cystectomy with Dr. Parsons present for JUAN related to small bowel 2/2 to patient's history of Chron's, total colectomy, and ileostomy. Overall surgery went well, no complications, please see op note for details.  5/31/19. POD#1. Originally patient doing well, pain well controlled on PCA pump. By 11AM patient spiked temp of 103. Clinically patient did not appear toxic, abdomen was soft non distended. She was treated with tylenol and temp came down. She then became febrile again. WBC and LA ordered, both of which were normal. Overnight patient continued to spike temperatures to 102-103 with no resolution after giving tylenol. She was placed on cooling blanket. Blood cultures drawn, started on IV abx linezolid and zosyn. Eventually by morning temperature came down, she was afebrile. LA and WBC remained normal. Throughout this time patient was seen overnight and again in the morning, still clinically looked fair, did not appear toxic or septic. Abdomen remained soft and non distended.   6/1/19. POD#2. Afebrile this AM, continuing on IV abx. Did well throughout the day with po pain medication. Ambulating, voiding, tolerating po without difficulty. Overnight patient became febrile again to 100.9, blood culture ordered from port site. CT abdomen/pelvis with IV and po contrast ordered. Patient's  fever resolved by morning. Continue IV abx. Prelim blood cultures negative.   06/02/2019 POD#3 CT abdomen/pelvis concerning for possible abscess. Originally afebrile on rounding this AM however spiked temp to 103. Will consult IR for abscess drainage for source control of infection.   06/03/2019 POD#4 Had fever overnight, resolved with 1g tylenol. Overall fever curve decreasing. IR consulted about draining, tentatively scheduled for today. Will discuss with Dr. German.     Interval History:   Afebrile. Tolerating regular diet without nausea/vomiting. Ambulating and voiding without difficulty. Pain better controlled today. Started estrogen patch.     Scheduled Meds:   cephALEXin  250 mg Oral QHS    dronabinol  5 mg Oral BID WM    estradiol 0.05 mg/24 hr td ptwk  1 patch Transdermal Q7 Days    famotidine  20 mg Oral BID    mirtazapine  30 mg Oral QHS    valACYclovir  500 mg Oral Daily     Continuous Infusions:    PRN Meds:calcium carbonate, clonazePAM, diphenhydrAMINE, diphenhydrAMINE, HYDROmorphone, iohexol, loperamide, ondansetron, oxyCODONE-acetaminophen, oxyCODONE-acetaminophen, promethazine (PHENERGAN) IVPB, zolpidem    Review of patient's allergies indicates:   Allergen Reactions    Azathioprine sodium Other (See Comments)     Other reaction(s): pancreatitis  Other reaction(s): pancreatitis    Methotrexate analogues Other (See Comments)     leukopenia    Stelara [ustekinumab] Other (See Comments)     Multiple infections    Zofran [ondansetron hcl (pf)] Other (See Comments)     Per patient causes prolong QT    Vancomycin analogues Hives    Morphine Itching and Other (See Comments)     Other reaction(s): Itching    Bactrim [sulfamethoxazole-trimethoprim] Palpitations    Ciprofloxacin Palpitations       Objective:     Vital Signs (Most Recent):  Temp: 98.4 °F (36.9 °C) (06/07/19 0348)  Pulse: 99 (06/07/19 0348)  Resp: 16 (06/07/19 0348)  BP: 112/72 (06/07/19 0348)  SpO2: 95 % (06/07/19 0348) Vital Signs  (24h Range):  Temp:  [98.4 °F (36.9 °C)-99.6 °F (37.6 °C)] 98.4 °F (36.9 °C)  Pulse:  [] 99  Resp:  [16] 16  SpO2:  [95 %-99 %] 95 %  BP: (112-141)/(72-83) 112/72     Weight: 51.7 kg (113 lb 15.7 oz)  Body mass index is 21.54 kg/m².    Intake/Output - Last 3 Shifts       06/05 0700 - 06/06 0659 06/06 0700 - 06/07 0659 06/07 0700 - 06/08 0659    P.O. 100      IV Piggyback 900      Total Intake(mL/kg) 1000 (19)      Urine (mL/kg/hr)  0 (0)     Drains 27.5 2.5     Stool  350     Total Output 27.5 352.5     Net +972.5 -352.5            Urine Occurrence  4 x              Physical Exam:   Constitutional: She is oriented to person, place, and time. She appears well-developed and well-nourished. No distress.   Resting comfortably in bed       HENT:   Head: Normocephalic and atraumatic.    Eyes: Conjunctivae and EOM are normal.    Neck: Normal range of motion. Neck supple. No thyromegaly present.    Cardiovascular: Normal rate, regular rhythm and normal heart sounds.     Pulmonary/Chest: Effort normal and breath sounds normal. No respiratory distress.        Abdominal: Soft. She exhibits abdominal incision (c/d/i). She exhibits no distension. There is no tenderness.   Abdomen soft, non distended. Appropriate TTP    Ostomy site c/d/i with good output              Musculoskeletal: Normal range of motion and moves all extremeties. She exhibits no edema or tenderness.       Neurological: She is alert and oriented to person, place, and time.    Skin: Skin is warm and dry. No rash noted.    Psychiatric: She has a normal mood and affect. Her behavior is normal.       Lines/Drains/Airways     Central Venous Catheter Line                 Port A Cath Single Lumen 02/01/17 0800 left subclavian 855 days          Drain                 Ileostomy RLQ -- days         Ileostomy 06/16/12 0000 RLQ 2547 days         Closed/Suction Drain 06/03/19 1510 Midline Back Bulb 8 Fr. 3 days                Laboratory:  BMP:   No results for  input(s): GLU, NA, K, CL, CO2, BUN, CREATININE, CALCIUM, MG in the last 48 hours., CBC:   Recent Labs   Lab 06/06/19  0346 06/07/19  0345   WBC 6.37 6.20   HGB 9.9* 9.0*   HCT 29.6* 28.2*   * 401*    and Urine Studies:   No results for input(s): COLORU, APPEARANCEUA, PHUR, SPECGRAV, PROTEINUA, GLUCUA, KETONESU, BILIRUBINUA, OCCULTUA, NITRITE, UROBILINOGEN, LEUKOCYTESUR, RBCUA, WBCUA, BACTERIA, SQUAMEPITHEL, HYALINECASTS in the last 48 hours.    Invalid input(s): WRIGHTSUR    Diagnostic Results:  CT Abdomen Pelvis:   EXAMINATION:  CT ABDOMEN PELVIS WITH CONTRAST    CLINICAL HISTORY:  post operative fever of unknown origin. Rule out abscess or bowel injury. Do not have high clinical suspicion based off of physical exam;    TECHNIQUE:  The patient was surveyed from the lung bases through the pelvis after the administration of 75 cc Omni 350 IV contrast as well as 30 cc Omni 350 oral contrast. The data was reconstructed for coronal, sagittal, and axial images.    COMPARISON:  MRI 05/15/2019; CT 12/06/2018; ultrasound 04/09/2019.    FINDINGS:  There are bandlike opacities within the right lower lobe consistent with atelectasis..  No pleural effusion is seen.    Central venous catheter tip partially visualized at the cavoatrial junction.  The visualized portions of the heart appear normal. No pericardial effusion.    The liver is normal in size and attenuation.  No focal hepatic abnormality is seen. The gallbladder is unremarkable.  No intrahepatic or extrahepatic biliary ductal dilatation is identified. The spleen is unremarkable.    The stomach, pancreas, and adrenal glands are unremarkable.    The kidneys are normal in size and location. There is a stable right renal cyst and bilateral subcentimeter hypodensities, too small to characterize but likely additional cysts.  Bilateral renal calculi are present measuring up to 0.3 cm on the right and 0.5 cm on the left.  There is mild left hydroureteronephrosis.  The  right ureter appears normal in course and caliber. The urinary bladder is unremarkable. The uterus is surgically absent.  There has been interval postsurgical changes of bilateral salpingo-oophorectomy.    There are postsurgical changes of colectomy with right lower quadrant end ileostomy.  No bowel obstruction.  In this patient with a history of Crohn's disease, evaluation for active bowel inflammation is limited by the presence of radiopaque oral contrast.    There is scattered foci of intraperitoneal free air, likely related to recent surgery.  Small volume free fluid is present throughout the abdomen and pelvis.  There is a large, peripherally enhancing fluid collection within the pelvis measuring 9.6 x 6.4 cm containing small foci of free air (axial image 65).  Additional smaller fluid collection is noted posteriorly on the left in the deep pelvis (axial image 66).  Additional collection present in the mid abdomen demonstrating mild rim enhancement measuring 4.1 x 3.3 cm (axial image 47).  Additional interloop fluid collections identified adjacent to a segment of small bowel in the left hemiabdomen (axial images 41 and 47).  No enteric contrast extravasation to suggest bowel injury.    There is no evidence of lymph node enlargement in the abdomen or pelvis.  The abdominal aorta is normal in course and caliber without significant atherosclerotic calcifications.    The osseous structures demonstrate no significant abnormality.  The extraperitoneal soft tissues demonstrate a healing midline abdominal incision.      Impression       1. In this patient with a history of recent bilateral salpingo-oophorectomy, there are nonspecific rim enhancing fluid collections in the abdomen and pelvis as discussed above, worrisome for abscesses or other postoperative fluid collections.  2. Scattered foci of intraperitoneal free air and small volume abdominal and pelvic free fluid, likely postsurgical in etiology.  3. Mild left  hydroureteronephrosis, not seen on prior studies.  Etiology of this finding is not definitively identified but could be related to pelvic inflammation.  Bilateral nonobstructing nephrolithiasis appear stable.  4. Postsurgical changes of hysterectomy and colectomy with right lower quadrant ileostomy.  5. Multiple additional findings as above.  This report was flagged in Epic as abnormal.    Electronically signed by resident: Mono Angelo  Date: 06/02/2019  Time: 00:23    Electronically signed by: Gerardo Chambers MD  Date: 06/02/2019  Time: 04:11            Assessment/Plan:   POD#8  s/p ExLap/BSO/JUAN  - Doing well   - Tolerating po without n/v  - good ostomy output   - ambulating and voiding without difficulty  - post op H/H stable 10/34  - doing well with po pain medication  - started on estradiol patch   - SCDs for DVT ppx.   - will work with SW for medication coverage through insurance      Post operative Fever:  - has been afebrile for over 48 hours   - blood cultures negative  - did have 100.6 temp on preop visit prior to surgery, possible underlying etiology   - LA 1.1>0.9  - WBC 11>16>14>7>6>6  - overall fever curve improving and WBC improving   - continue abx zosyn and linezolid (patient allergic to vancomycin), plan to keep on for at least 48 hours afebrile. Will d/c this AM  - POD#4 s/p IR drainage. Cultures still pending  - Drain pulled yesterday   - anticipate d/c home today      Crohn's  - S/P total colectomy  - Avoid NSAIDs  - Monitor for ileostomy output         Depression/Anxiety  - Continue home clonaxepam   - Continue remeron  - continue home ambiens        HTN  - No meds  - Monitor BP closely        GERD  - Continue zantac BID      HSV  - continue home valtrex       VTE Risk Mitigation (From admission, onward)        Ordered     Place sequential compression device  Until discontinued      05/30/19 6645     Place JEANNA hose  Until discontinued      05/30/19 0545            Beverly Fishman,  MD  Gynecologic Oncology  [facility]

## 2019-06-07 NOTE — PLAN OF CARE
Problem: Adult Inpatient Plan of Care  Goal: Plan of Care Review  Outcome: Ongoing (interventions implemented as appropriate)  Plan of care reviewed with the patient at the beginning of shift. The patient complained of some pain during the night. Controlled with PO medications and one dose of IV medication. Pt ambulated in the velasco. Complaining of some anxiety about discharge. Bed in low locked position. Call bell within reach. Will continue to monitor.

## 2019-06-09 ENCOUNTER — PATIENT MESSAGE (OUTPATIENT)
Dept: GYNECOLOGIC ONCOLOGY | Facility: CLINIC | Age: 39
End: 2019-06-09

## 2019-06-10 ENCOUNTER — TELEPHONE (OUTPATIENT)
Dept: GYNECOLOGIC ONCOLOGY | Facility: CLINIC | Age: 39
End: 2019-06-10

## 2019-06-10 ENCOUNTER — PATIENT MESSAGE (OUTPATIENT)
Dept: WOUND CARE | Facility: CLINIC | Age: 39
End: 2019-06-10

## 2019-06-10 LAB — BACTERIA SPEC ANAEROBE CULT: NORMAL

## 2019-06-11 ENCOUNTER — OFFICE VISIT (OUTPATIENT)
Dept: GYNECOLOGIC ONCOLOGY | Facility: CLINIC | Age: 39
End: 2019-06-11
Payer: MEDICAID

## 2019-06-11 ENCOUNTER — PATIENT MESSAGE (OUTPATIENT)
Dept: SURGERY | Facility: CLINIC | Age: 39
End: 2019-06-11

## 2019-06-11 VITALS
DIASTOLIC BLOOD PRESSURE: 83 MMHG | WEIGHT: 111.75 LBS | HEIGHT: 61 IN | BODY MASS INDEX: 21.1 KG/M2 | HEART RATE: 88 BPM | SYSTOLIC BLOOD PRESSURE: 113 MMHG

## 2019-06-11 DIAGNOSIS — R19.00 PELVIC MASS: Primary | ICD-10-CM

## 2019-06-11 DIAGNOSIS — Z90.722 S/P BSO (BILATERAL SALPINGO-OOPHORECTOMY): ICD-10-CM

## 2019-06-11 PROCEDURE — 99213 OFFICE O/P EST LOW 20 MIN: CPT | Mod: PBBFAC | Performed by: OBSTETRICS & GYNECOLOGY

## 2019-06-11 PROCEDURE — 99024 PR POST-OP FOLLOW-UP VISIT: ICD-10-PCS | Mod: ,,, | Performed by: OBSTETRICS & GYNECOLOGY

## 2019-06-11 PROCEDURE — 99999 PR PBB SHADOW E&M-EST. PATIENT-LVL III: ICD-10-PCS | Mod: PBBFAC,,, | Performed by: OBSTETRICS & GYNECOLOGY

## 2019-06-11 PROCEDURE — 99999 PR PBB SHADOW E&M-EST. PATIENT-LVL III: CPT | Mod: PBBFAC,,, | Performed by: OBSTETRICS & GYNECOLOGY

## 2019-06-11 PROCEDURE — 99024 POSTOP FOLLOW-UP VISIT: CPT | Mod: ,,, | Performed by: OBSTETRICS & GYNECOLOGY

## 2019-06-11 RX ORDER — FLUCONAZOLE 150 MG/1
TABLET ORAL
Refills: 0 | COMMUNITY
Start: 2019-06-07 | End: 2019-06-25 | Stop reason: ALTCHOICE

## 2019-06-11 NOTE — LETTER
June 16, 2019        Alix Morales MD  [address] [place] [id number]             [address]  [address], [address]  [facility] [address]  Phone: [phone]  Fax: [phone]   Patient: Ivy Salgado   MR Number: [id number]   YOB: 1980   Date of Visit: 6/11/2019       Dear Dr. Morales:    Thank you for referring Ivy Salgado to me for evaluation. Below are the relevant portions of my assessment and plan of care.            If you have questions, please do not hesitate to call me. I look forward to following Ivy along with you.    Sincerely,      MD HIRO Maria MD James W Smith, MD Samuel Colby Danna, MD

## 2019-06-11 NOTE — PHYSICIAN QUERY
PT Name: Ivy Salgado  MR #: [id number]    Physician Query Form - Pathology Findings Clarification     CDS/: MARTÍN Taylor,RNC-MNN          Contact information:[email or web address]  This form is a permanent document in the medical record.     Query Date: June 11, 2019      By submitting this query, we are merely seeking further clarification of documentation.  Please utilize your independent clinical judgment when addressing the question(s) below.      The medical record contains the following:     Findings Supporting Clinical Information Location in Medical Record   FINAL PATHOLOGIC DIAGNOSIS  1. Right fallopian tube and ovary (excision):  Benign ovary with hemorrhagic corpus luteal cysts and benign fallopian tube with hydrosalpinx  2. Left fallopian tube and ovary (excision):  Benign ovary with hemorrhagic corpus luteal cysts and benign fallopian tube with hydrosalpinx                       PROCEDURES PERFORMED: Exploratory laparotomy, lysis of adhesions, bilateral salpingo-oophorectomy/mass resection    FINDINGS: Small bowel pelvic adhesive disease requiring lysis of adhesions. 5cm right adnexal cyst involving the right fallopian tube and ovary adherent to the external iliac artery and vein. 7cm left pelvic cyst involving the left fallopian tube and ovary adherent to the posterior cul de sac/vaginal cuff and left pelvic sidewall. No ascites, no obvious evidence of intraperitoneal disease. Complete resection of pelvic masses and bilateral ovaries was performed.   Pathology report 5/30                    Op note 5/30     Please document the clinical significance of the Pathologists findings of   1. Right fallopian tube and ovary (excision):  Benign ovary with hemorrhagic corpus luteal cysts and benign fallopian tube with hydrosalpinx  2. Left fallopian tube and ovary (excision):  Benign ovary with hemorrhagic corpus luteal cysts and benign fallopian tube with hydrosalpinx.    [ X  ] I agree  with the Pathology Findings   [   ] I do not agree with the Pathology Findings   [   ] Other/Clarification of Findings:   [   ] Clinically Insignificant   [  ] Clinically Undetermined       Please document in your progress notes daily for the duration of treatment until resolved and include in your discharge summary.

## 2019-06-12 ENCOUNTER — TELEPHONE (OUTPATIENT)
Dept: DERMATOLOGY | Facility: CLINIC | Age: 39
End: 2019-06-12

## 2019-06-12 NOTE — TELEPHONE ENCOUNTER
L/M for pt. on cell. Per Dr. Simon, skin check can wait til mid-July. Priority right now is to have abdominal lesion completely removed. She's to call back with any other questions or concerns.

## 2019-06-13 ENCOUNTER — LAB VISIT (OUTPATIENT)
Dept: LAB | Facility: HOSPITAL | Age: 39
End: 2019-06-13
Attending: INTERNAL MEDICINE
Payer: MEDICAID

## 2019-06-13 DIAGNOSIS — M85.80 LOW BONE MASS: ICD-10-CM

## 2019-06-13 DIAGNOSIS — M85.9 LOW BONE DENSITY: ICD-10-CM

## 2019-06-13 DIAGNOSIS — E04.2 MULTIPLE THYROID NODULES: ICD-10-CM

## 2019-06-13 LAB
25(OH)D3+25(OH)D2 SERPL-MCNC: 21 NG/ML (ref 30–96)
CORTIS SERPL-MCNC: 10.7 UG/DL (ref 4.3–22.4)
ESTRADIOL SERPL-MCNC: 50 PG/ML
FSH SERPL-ACNC: 40.1 MIU/ML
LH SERPL-ACNC: 51.5 MIU/ML
TSH SERPL DL<=0.005 MIU/L-ACNC: 1.8 UIU/ML (ref 0.4–4)

## 2019-06-13 PROCEDURE — 83002 ASSAY OF GONADOTROPIN (LH): CPT

## 2019-06-13 PROCEDURE — 84443 ASSAY THYROID STIM HORMONE: CPT

## 2019-06-13 PROCEDURE — 36415 COLL VENOUS BLD VENIPUNCTURE: CPT

## 2019-06-13 PROCEDURE — 83001 ASSAY OF GONADOTROPIN (FSH): CPT

## 2019-06-13 PROCEDURE — 82306 VITAMIN D 25 HYDROXY: CPT

## 2019-06-13 PROCEDURE — 82670 ASSAY OF TOTAL ESTRADIOL: CPT

## 2019-06-13 PROCEDURE — 82533 TOTAL CORTISOL: CPT

## 2019-06-14 DIAGNOSIS — Z79.899 ENCOUNTER FOR LONG-TERM (CURRENT) USE OF HIGH-RISK MEDICATION: ICD-10-CM

## 2019-06-14 DIAGNOSIS — K50.819 CROHN'S DISEASE OF BOTH SMALL AND LARGE INTESTINE WITH COMPLICATION: ICD-10-CM

## 2019-06-15 ENCOUNTER — PATIENT MESSAGE (OUTPATIENT)
Dept: GYNECOLOGIC ONCOLOGY | Facility: CLINIC | Age: 39
End: 2019-06-15

## 2019-06-16 NOTE — PROGRESS NOTES
Subjective:      Patient ID: Ivy Salgado is a 38 y.o. female.    Chief Complaint: Post-op Evaluation (2 week )      HPI  S/p xlap/BSO/extensive JUAN 5/30/19. Post op course complicated by fever and pelvic abscess, resolved with IV antibiotics and IR drain.   Final pathology reviewed and benign.     Presents today for post operative visit. Slowly progressing at home. Still with abdominal discomforts. However up and walking, urinating, and ostomy with good output.     History:  Referred by Dr. Alix Morales for bilateral ovarian cysts and complex surgical history.      Has Crohn's disease with multiple prior intestinal procedures with resultant total colectomy and end ileostomy in 2012 with Dr. Parsons. In 2015 underwent SHELLIE/RSO, review of operative report note severe adhesive disease with incidental cystotomy and unable to visualize left adnexal due to adhesive disease. GI physician Dr. Ross.      More recently developed worsening pelvic pain.   12/6/18 CT A&P  Impression       1.  Several bilateral renal hypodensities, favored to represent cysts, unchanged from prior CT.  2.  Nonobstructive bilateral nephrolithiasis.  3.  Right adnexal complex cystic structure measuring 6.2 x 0.5 x 6.5 cm, similar to a pelvic ultrasound 07/03/2018.  Once again, as noted on the patient's prior ultrasound examination, this may represent a peritoneal inclusion cyst or ovarian cyst from a remnant of ovarian tissue in this patient status post right oophorectomy and hysterectomy.      Pelvic US 4/9/19  Impression       1. Redemonstration of complex left ovarian cystic lesion with suspected internal solid component.  This appears stable to mildly increased in size compared to previous pelvic ultrasound from 03/19/2019, allowing for interobserver variability.  Ob GYN follow-up is recommended if not previously obtained.  2. Right adnexal septated cystic structure versus adjacent cysts.  3. Postsurgical changes of hysterectomy and right  oophorectomy.      Family history significant for maternal aunt with breast cancer, maternal aunt with endometrial cancer, father with colon cancer, and paternal grandmother with esophageal cancer. 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 Has plans to start additional schooling in August and a vacation planning in July.       MRI 5/15/19  Impression         Status post changes of proctocolectomy with right lower quadrant ileostomy.  No evidence for active inflammatory bowel disease.    Right adnexal cystic lesion, similar to slightly decreased in size from the 12/06/2018 CT exam.    Large (7.9 by 5.5 cm) left adnexal cystic lesion with small area of peripheral enhancement, possible residual ovarian tissue, grossly similar to the 04/09/2019 pelvic ultrasound.  gynecologic follow-up recommended.       Review of Systems   Constitutional: Negative for appetite change, chills, fatigue and fever.   HENT: Negative for mouth sores.    Respiratory: Negative for cough and shortness of breath.    Cardiovascular: Negative for leg swelling.   Gastrointestinal: Negative for abdominal pain, blood in stool, constipation and diarrhea.   Endocrine: Negative for cold intolerance.   Genitourinary: Negative for dysuria and vaginal bleeding.   Musculoskeletal: Negative for myalgias.   Skin: Negative for rash.   Allergic/Immunologic: Negative.    Neurological: Negative for weakness and numbness.   Hematological: Negative for adenopathy. Does not bruise/bleed easily.   Psychiatric/Behavioral: Negative for confusion.       Objective:   Physical Exam:   Constitutional: She is oriented to person, place, and time. She appears well-developed and well-nourished.    HENT:   Head: Normocephalic and atraumatic.    Eyes: Pupils are equal, round, and reactive to light. EOM are normal.    Neck: Normal range of motion. Neck supple. No thyromegaly present.    Cardiovascular: Normal rate, regular rhythm and intact distal pulses.     Pulmonary/Chest:  Effort normal and breath sounds normal. No respiratory distress. She has no wheezes.        Abdominal: Soft. Bowel sounds are normal. She exhibits abdominal incision. She exhibits no distension, no ascites and no mass. There is no tenderness.             Musculoskeletal: Normal range of motion and moves all extremeties.      Lymphadenopathy:     She has no cervical adenopathy.        Right: No supraclavicular adenopathy present.        Left: No supraclavicular adenopathy present.    Neurological: She is alert and oriented to person, place, and time.    Skin: Skin is warm and dry. No rash noted.    Psychiatric: She has a normal mood and affect.       Assessment:     1. Pelvic mass    2. S/P ExLap, BSO, 5/30/19        Plan:   No orders of the defined types were placed in this encounter.    Recovering slowly at home.   No fevers since hospital discharge, ostomy with good output, she is up and about.   Continue post op care.   Benign pathology.   RTC 2 weeks for follow up post op visit.

## 2019-06-17 ENCOUNTER — PATIENT MESSAGE (OUTPATIENT)
Dept: WOUND CARE | Facility: CLINIC | Age: 39
End: 2019-06-17

## 2019-06-17 ENCOUNTER — PATIENT MESSAGE (OUTPATIENT)
Dept: GYNECOLOGIC ONCOLOGY | Facility: CLINIC | Age: 39
End: 2019-06-17

## 2019-06-17 ENCOUNTER — TELEPHONE (OUTPATIENT)
Dept: OBSTETRICS AND GYNECOLOGY | Facility: HOSPITAL | Age: 39
End: 2019-06-17

## 2019-06-17 DIAGNOSIS — K50.819 CROHN'S DISEASE OF SMALL AND LARGE INTESTINES WITH COMPLICATION: ICD-10-CM

## 2019-06-17 RX ORDER — DRONABINOL 5 MG/1
CAPSULE ORAL
Qty: 60 CAPSULE | Refills: 0 | Status: SHIPPED | OUTPATIENT
Start: 2019-06-17 | End: 2019-07-17 | Stop reason: SDUPTHER

## 2019-06-17 RX ORDER — OXYCODONE AND ACETAMINOPHEN 10; 325 MG/1; MG/1
1 TABLET ORAL EVERY 6 HOURS PRN
Qty: 30 TABLET | Refills: 0 | Status: SHIPPED | OUTPATIENT
Start: 2019-06-17 | End: 2019-06-19 | Stop reason: SDUPTHER

## 2019-06-18 ENCOUNTER — OFFICE VISIT (OUTPATIENT)
Dept: WOUND CARE | Facility: CLINIC | Age: 39
End: 2019-06-18
Payer: MEDICAID

## 2019-06-18 ENCOUNTER — PATIENT MESSAGE (OUTPATIENT)
Dept: WOUND CARE | Facility: CLINIC | Age: 39
End: 2019-06-18

## 2019-06-18 DIAGNOSIS — Z43.2 ATTENTION TO ILEOSTOMY: Primary | ICD-10-CM

## 2019-06-18 PROCEDURE — 99214 OFFICE O/P EST MOD 30 MIN: CPT | Mod: S$PBB,,, | Performed by: CLINICAL NURSE SPECIALIST

## 2019-06-18 PROCEDURE — 99999 PR PBB SHADOW E&M-EST. PATIENT-LVL II: CPT | Mod: PBBFAC,,, | Performed by: CLINICAL NURSE SPECIALIST

## 2019-06-18 PROCEDURE — 99999 PR PBB SHADOW E&M-EST. PATIENT-LVL II: ICD-10-PCS | Mod: PBBFAC,,, | Performed by: CLINICAL NURSE SPECIALIST

## 2019-06-18 PROCEDURE — 99212 OFFICE O/P EST SF 10 MIN: CPT | Mod: PBBFAC | Performed by: CLINICAL NURSE SPECIALIST

## 2019-06-18 PROCEDURE — 99214 PR OFFICE/OUTPT VISIT, EST, LEVL IV, 30-39 MIN: ICD-10-PCS | Mod: S$PBB,,, | Performed by: CLINICAL NURSE SPECIALIST

## 2019-06-18 NOTE — PROGRESS NOTES
Subjective:       Patient ID: Ivy Salgado is a 38 y.o. female.    Chief Complaint: Ileostomy and Wound Check    This is well known pt to me and she has had ileo for 5 years or so, recently had a mole biopsied and will now need excision. However the bx site is of concern to her so she came and wanted it assessed, she also has anxiety about the excision edith for July    Review of Systems   Constitutional: Negative for activity change, appetite change and fever.   HENT: Negative for congestion and rhinorrhea.    Respiratory: Negative for cough and shortness of breath.    Cardiovascular: Negative.    Gastrointestinal: Negative.    Genitourinary: Negative.    Musculoskeletal: Negative.    Skin: Positive for wound.   Neurological: Negative.    Psychiatric/Behavioral: Negative.        Objective:      Physical Exam   Constitutional: She appears well-developed and well-nourished.   Pulmonary/Chest: Effort normal. No respiratory distress.   Abdominal: Soft. Bowel sounds are normal.   Musculoskeletal: Normal range of motion. She exhibits no edema.   Skin: Skin is warm and dry. No rash noted.   Psychiatric: She has a normal mood and affect.       The  bx site is healed, she will have excision done 7/11 and will have a larger wound close to stoma, we discussed care of this and I will help her with a plan and supplies  Also suggested she try the 2 mm ring instead of 4 mm .   Will send script to  [facility]     Assessment:       1. Attention to ileostomy        Plan:       Bx site is healed and needs no other wound care   Discussed pouching options with upcoming mole removal.  Script to [facility]  I have reviewed the plan of care with the patient  and she express understanding. I spent over 50% of this 25 minute visit in face to face counseling.

## 2019-06-19 DIAGNOSIS — K50.819 CROHN'S DISEASE OF SMALL AND LARGE INTESTINES WITH COMPLICATION: ICD-10-CM

## 2019-06-19 RX ORDER — OXYCODONE AND ACETAMINOPHEN 10; 325 MG/1; MG/1
1 TABLET ORAL EVERY 4 HOURS PRN
Qty: 30 TABLET | Refills: 0 | Status: SHIPPED | OUTPATIENT
Start: 2019-06-19 | End: 2019-07-01 | Stop reason: SDUPTHER

## 2019-06-24 ENCOUNTER — TELEPHONE (OUTPATIENT)
Dept: GYNECOLOGIC ONCOLOGY | Facility: CLINIC | Age: 39
End: 2019-06-24

## 2019-06-25 ENCOUNTER — PATIENT MESSAGE (OUTPATIENT)
Dept: ENDOCRINOLOGY | Facility: CLINIC | Age: 39
End: 2019-06-25

## 2019-06-25 ENCOUNTER — OFFICE VISIT (OUTPATIENT)
Dept: GYNECOLOGIC ONCOLOGY | Facility: CLINIC | Age: 39
End: 2019-06-25
Payer: MEDICAID

## 2019-06-25 ENCOUNTER — PATIENT MESSAGE (OUTPATIENT)
Dept: OBSTETRICS AND GYNECOLOGY | Facility: CLINIC | Age: 39
End: 2019-06-25

## 2019-06-25 VITALS
HEART RATE: 85 BPM | DIASTOLIC BLOOD PRESSURE: 87 MMHG | BODY MASS INDEX: 21.94 KG/M2 | HEIGHT: 61 IN | WEIGHT: 116.19 LBS | SYSTOLIC BLOOD PRESSURE: 122 MMHG

## 2019-06-25 DIAGNOSIS — Z90.722 S/P BSO (BILATERAL SALPINGO-OOPHORECTOMY): ICD-10-CM

## 2019-06-25 DIAGNOSIS — F41.9 ANXIETY: ICD-10-CM

## 2019-06-25 DIAGNOSIS — R19.00 PELVIC MASS: Primary | ICD-10-CM

## 2019-06-25 DIAGNOSIS — N95.1 MENOPAUSAL SYMPTOMS: ICD-10-CM

## 2019-06-25 PROCEDURE — 99213 OFFICE O/P EST LOW 20 MIN: CPT | Mod: PBBFAC | Performed by: OBSTETRICS & GYNECOLOGY

## 2019-06-25 PROCEDURE — 99024 PR POST-OP FOLLOW-UP VISIT: ICD-10-PCS | Mod: ,,, | Performed by: OBSTETRICS & GYNECOLOGY

## 2019-06-25 PROCEDURE — 99999 PR PBB SHADOW E&M-EST. PATIENT-LVL III: CPT | Mod: PBBFAC,,, | Performed by: OBSTETRICS & GYNECOLOGY

## 2019-06-25 PROCEDURE — 99999 PR PBB SHADOW E&M-EST. PATIENT-LVL III: ICD-10-PCS | Mod: PBBFAC,,, | Performed by: OBSTETRICS & GYNECOLOGY

## 2019-06-25 PROCEDURE — 99024 POSTOP FOLLOW-UP VISIT: CPT | Mod: ,,, | Performed by: OBSTETRICS & GYNECOLOGY

## 2019-06-25 RX ORDER — ESTRADIOL 0.1 MG/D
1 FILM, EXTENDED RELEASE TRANSDERMAL
Qty: 8 PATCH | Refills: 11 | Status: SHIPPED | OUTPATIENT
Start: 2019-06-27 | End: 2019-07-30

## 2019-06-25 RX ORDER — ZOLPIDEM TARTRATE 10 MG/1
TABLET ORAL
Qty: 30 TABLET | Refills: 0 | Status: SHIPPED | OUTPATIENT
Start: 2019-06-25 | End: 2019-07-22 | Stop reason: SDUPTHER

## 2019-06-25 NOTE — PROGRESS NOTES
Subjective:       Patient ID: Ivy Salgado is a 39 y.o. female.    Chief Complaint: Pelvic Mass    HPI     S/p xlap/BSO/extensive JUAN 5/30/19. Post op course complicated by fever and pelvic abscess, resolved with IV antibiotics and IR drain.   Final pathology reviewed and benign.      Presents today for 2nd post operative visit. Continues to progress at home. Still with abdominal discomforts. Eating and has good output from ostomy but has GI follow up scheduled. Taking an occasional pain med and doesn't feel strong enough to drive yet. Requesting increase in estrogen patch dose. Having VMS on current dose and had hormone levels checked with endocrinology and estradiol level was low.     History:  Referred by Dr. Alix Morales for bilateral ovarian cysts and complex surgical history.      Has Crohn's disease with multiple prior intestinal procedures with resultant total colectomy and end ileostomy in 2012 with Dr. Parsons. In 2015 underwent SHELLIE/RSO, review of operative report note severe adhesive disease with incidental cystotomy and unable to visualize left adnexal due to adhesive disease. GI physician Dr. Ross.      More recently developed worsening pelvic pain.   12/6/18 CT A&P  Impression       1.  Several bilateral renal hypodensities, favored to represent cysts, unchanged from prior CT.  2.  Nonobstructive bilateral nephrolithiasis.  3.  Right adnexal complex cystic structure measuring 6.2 x 0.5 x 6.5 cm, similar to a pelvic ultrasound 07/03/2018.  Once again, as noted on the patient's prior ultrasound examination, this may represent a peritoneal inclusion cyst or ovarian cyst from a remnant of ovarian tissue in this patient status post right oophorectomy and hysterectomy.      Pelvic US 4/9/19  Impression       1. Redemonstration of complex left ovarian cystic lesion with suspected internal solid component.  This appears stable to mildly increased in size compared to previous pelvic ultrasound from  03/19/2019, allowing for interobserver variability.  Ob GYN follow-up is recommended if not previously obtained.  2. Right adnexal septated cystic structure versus adjacent cysts.  3. Postsurgical changes of hysterectomy and right oophorectomy.      Family history significant for maternal aunt with breast cancer, maternal aunt with endometrial cancer, father with colon cancer, and paternal grandmother with esophageal cancer. 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 Has plans to start additional schooling in August and a vacation planning in July.       MRI 5/15/19  Impression         Status post changes of proctocolectomy with right lower quadrant ileostomy.  No evidence for active inflammatory bowel disease.    Right adnexal cystic lesion, similar to slightly decreased in size from the 12/06/2018 CT exam.    Large (7.9 by 5.5 cm) left adnexal cystic lesion with small area of peripheral enhancement, possible residual ovarian tissue, grossly similar to the 04/09/2019 pelvic ultrasound.  gynecologic follow-up recommended.        Review of Systems   Constitutional: Negative for appetite change, chills, diaphoresis, fatigue, fever and unexpected weight change.   Respiratory: Negative for cough, chest tightness, shortness of breath and wheezing.    Cardiovascular: Negative for chest pain, palpitations and leg swelling.   Gastrointestinal: Negative for abdominal distention, abdominal pain, blood in stool, constipation, diarrhea, nausea and vomiting.   Genitourinary: Positive for vaginal discharge. Negative for difficulty urinating, dysuria, flank pain, frequency, hematuria, pelvic pain, vaginal bleeding and vaginal pain.   Musculoskeletal: Negative for arthralgias and back pain.   Skin: Negative for color change and rash.   Neurological: Negative for dizziness, weakness, numbness and headaches.   Hematological: Negative for adenopathy.   Psychiatric/Behavioral: Positive for dysphoric mood. Negative for confusion and sleep  disturbance. The patient is not nervous/anxious.        Objective:      Physical Exam   Constitutional: She is oriented to person, place, and time. She appears well-developed and well-nourished. No distress.   HENT:   Head: Normocephalic and atraumatic.   Eyes: No scleral icterus.   Neck: Normal range of motion.   Pulmonary/Chest: Effort normal. No respiratory distress.   Abdominal: Soft. She exhibits no distension. There is no tenderness.       Vertical incision C/D/I   Musculoskeletal: Normal range of motion. She exhibits no edema.   Neurological: She is alert and oriented to person, place, and time.   Skin: Skin is warm and dry. No rash noted. She is not diaphoretic. No pallor.   Psychiatric: She has a normal mood and affect. Her behavior is normal.   Nursing note and vitals reviewed.      Assessment:       1. Pelvic mass    2. S/P ExLap, BSO, 5/30/19    3. Menopausal symptoms        Plan:       - continues to heal well from surgery  - reviewed vaginal discharge. No other signs of infection and she prefers to not take antibiotics if not necessary. Vaginitis precautions.  - will increase estradiol patch to 0.1mg twice weekly and continue to monitor symptoms  - benign pathology  - RTC as needed

## 2019-06-26 ENCOUNTER — PATIENT MESSAGE (OUTPATIENT)
Dept: SURGERY | Facility: CLINIC | Age: 39
End: 2019-06-26

## 2019-06-26 ENCOUNTER — PATIENT MESSAGE (OUTPATIENT)
Dept: OBSTETRICS AND GYNECOLOGY | Facility: CLINIC | Age: 39
End: 2019-06-26

## 2019-06-28 ENCOUNTER — PATIENT MESSAGE (OUTPATIENT)
Dept: SURGERY | Facility: CLINIC | Age: 39
End: 2019-06-28

## 2019-06-28 ENCOUNTER — PATIENT MESSAGE (OUTPATIENT)
Dept: WOUND CARE | Facility: CLINIC | Age: 39
End: 2019-06-28

## 2019-06-30 ENCOUNTER — PATIENT MESSAGE (OUTPATIENT)
Dept: GYNECOLOGIC ONCOLOGY | Facility: CLINIC | Age: 39
End: 2019-06-30

## 2019-06-30 DIAGNOSIS — F41.1 GENERALIZED ANXIETY DISORDER: ICD-10-CM

## 2019-06-30 RX ORDER — CLONAZEPAM 1 MG/1
TABLET ORAL
Qty: 60 TABLET | Refills: 0 | Status: SHIPPED | OUTPATIENT
Start: 2019-06-30 | End: 2019-08-01 | Stop reason: SDUPTHER

## 2019-07-01 ENCOUNTER — OFFICE VISIT (OUTPATIENT)
Dept: GASTROENTEROLOGY | Facility: CLINIC | Age: 39
End: 2019-07-01
Payer: MEDICAID

## 2019-07-01 VITALS
WEIGHT: 114.88 LBS | HEART RATE: 86 BPM | HEIGHT: 61 IN | DIASTOLIC BLOOD PRESSURE: 89 MMHG | SYSTOLIC BLOOD PRESSURE: 130 MMHG | BODY MASS INDEX: 21.69 KG/M2

## 2019-07-01 DIAGNOSIS — Z79.899 ENCOUNTER FOR LONG-TERM (CURRENT) USE OF HIGH-RISK MEDICATION: ICD-10-CM

## 2019-07-01 DIAGNOSIS — K50.819 CROHN'S DISEASE OF SMALL AND LARGE INTESTINES WITH COMPLICATION: ICD-10-CM

## 2019-07-01 DIAGNOSIS — N76.0 ACUTE VAGINITIS: Primary | ICD-10-CM

## 2019-07-01 DIAGNOSIS — K21.9 GASTROESOPHAGEAL REFLUX DISEASE, ESOPHAGITIS PRESENCE NOT SPECIFIED: ICD-10-CM

## 2019-07-01 DIAGNOSIS — K50.019 CROHN'S DISEASE OF SMALL INTESTINE WITH COMPLICATION: Primary | Chronic | ICD-10-CM

## 2019-07-01 PROCEDURE — 99214 OFFICE O/P EST MOD 30 MIN: CPT | Mod: S$PBB,,, | Performed by: INTERNAL MEDICINE

## 2019-07-01 PROCEDURE — 99214 PR OFFICE/OUTPT VISIT, EST, LEVL IV, 30-39 MIN: ICD-10-PCS | Mod: S$PBB,,, | Performed by: INTERNAL MEDICINE

## 2019-07-01 PROCEDURE — 99999 PR PBB SHADOW E&M-EST. PATIENT-LVL III: CPT | Mod: PBBFAC,,, | Performed by: INTERNAL MEDICINE

## 2019-07-01 PROCEDURE — 99213 OFFICE O/P EST LOW 20 MIN: CPT | Mod: PBBFAC | Performed by: INTERNAL MEDICINE

## 2019-07-01 PROCEDURE — 99999 PR PBB SHADOW E&M-EST. PATIENT-LVL III: ICD-10-PCS | Mod: PBBFAC,,, | Performed by: INTERNAL MEDICINE

## 2019-07-01 RX ORDER — METRONIDAZOLE 500 MG/1
500 TABLET ORAL 2 TIMES DAILY
Qty: 14 TABLET | Refills: 0 | Status: SHIPPED | OUTPATIENT
Start: 2019-07-01 | End: 2019-07-08

## 2019-07-01 RX ORDER — OXYCODONE AND ACETAMINOPHEN 10; 325 MG/1; MG/1
1 TABLET ORAL EVERY 4 HOURS PRN
Qty: 30 TABLET | Refills: 0 | Status: SHIPPED | OUTPATIENT
Start: 2019-07-01 | End: 2019-07-23 | Stop reason: SDUPTHER

## 2019-07-01 NOTE — PROGRESS NOTES
Subjective:       Patient ID: Ivy Salgado is a 39 y.o. female.    Chief Complaint: Crohn's Disease    HPI  Review of Systems   Endocrine: Positive for cold intolerance and heat intolerance.       Objective:      Physical Exam   Abdominal: Soft. Normal appearance and bowel sounds are normal. She exhibits no shifting dullness, no distension, no fluid wave, no abdominal bruit and no mass. There is no hepatosplenomegaly. There is no tenderness.       Assessment:       1. Crohn's disease of small intestine with complication    2. Gastroesophageal reflux disease, esophagitis presence not specified    3. Encounter for long-term (current) use of high-risk medication        Plan:         Ivy is here today for follow-up.  She is a 39-year-old lady with complicated Crohn's disease.  She has had multiple surgeries.  She is on entyvio.  Fortunately documented in March that she was doing well.  MR enterography showed the disease to be in remission.    However since March she has had a number of complications and issues.  One month ago she had exploratory surgery with lysis of adhesion lysis of adhesions.  There was removal of her ovaries.  None of the bowel were was resected.  Dr. Parsons was in the operating room.  She did develop an abscess postop that required drainage.  The other issue that has risen is the discovery of a melanoma.  Unfortunately this is right next to her ileostomy.  She is due for surgery and this will be done in the next 2 weeks.    I reviewed her current gastrointestinal symptoms.  She has had a couple of episodes recently of mid abdominal pain. This occurred after eating.  It did not lead to nausea or vomiting. It was similar to pain she has had in the past with her Crohn's disease. She thinks output from her ileostomy is more watery than has been in the past.  There is no foul smell.  She does not think there is a C diff infection.  She is actually taking less Lomotil now.  She takes Imodium in  that field that helps her.  She has occasional heartburn and takes as needed Zantac.  Her appetite is poor in general.  Marinol helps her appetite in fact if she did take her appetite be very poor but she has been able to maintain her weight.    Assessment  1.  Crohn's disease this is a very complicated and difficult issue right now.  She has had recent pelvic surgery.  We have had to hold the in tibial.  Now she has the diagnosis of myeloma with an upcoming surgery.  I think we should continue the hold the entyvio and then started as soon as we can after her melanoma surgery.  I do not think the current abdominal pain is due to active Crohn's disease. But will have to keep a close eye on this  2.  Gastroesophageal reflux okay to continue the Zantac on as needed basis  3.  Long-term use of high-risk medicine she has a number of high-risk medicines.  She is on chronic opiates for pain medicine.  She has a pain contract with me.  However that was put on hold with her referral to surgery.  After surgery the surgeon to care for pain medicine use.  She is still in that postop.  With the surgeons in control of that.  I reviewed the  database today.  No other opiates are being used.  I can resume control of that later on when she fully recovers although she does have another surgery coming up    Recommendation 1.  Hold entyvio for now.  2.  Keep me apprised of any issues post-op  her treatment again after the melanoma surgery    30 min appointment greater than half the time face-to-face counseling

## 2019-07-01 NOTE — PROGRESS NOTES
Subjective:       Patient ID: Ivy Salgado is a 39 y.o. female.    Chief Complaint: Crohn's Disease    HPI  Review of Systems    Objective:      Physical Exam    Assessment:       1. Crohn's disease of small intestine with complication    2. Gastroesophageal reflux disease, esophagitis presence not specified    3. Encounter for long-term (current) use of high-risk medication        Plan:

## 2019-07-03 LAB — FUNGUS SPEC CULT: NORMAL

## 2019-07-14 ENCOUNTER — PATIENT MESSAGE (OUTPATIENT)
Dept: GASTROENTEROLOGY | Facility: CLINIC | Age: 39
End: 2019-07-14

## 2019-07-17 ENCOUNTER — PATIENT MESSAGE (OUTPATIENT)
Dept: INTERNAL MEDICINE | Facility: CLINIC | Age: 39
End: 2019-07-17

## 2019-07-17 ENCOUNTER — TELEPHONE (OUTPATIENT)
Dept: INTERNAL MEDICINE | Facility: CLINIC | Age: 39
End: 2019-07-17

## 2019-07-17 ENCOUNTER — TELEPHONE (OUTPATIENT)
Dept: INFECTIOUS DISEASES | Facility: HOSPITAL | Age: 39
End: 2019-07-17

## 2019-07-17 DIAGNOSIS — K50.819 CROHN'S DISEASE OF BOTH SMALL AND LARGE INTESTINE WITH COMPLICATION: ICD-10-CM

## 2019-07-17 DIAGNOSIS — Z79.899 ENCOUNTER FOR LONG-TERM (CURRENT) USE OF HIGH-RISK MEDICATION: ICD-10-CM

## 2019-07-17 RX ORDER — MIRTAZAPINE 30 MG/1
TABLET, ORALLY DISINTEGRATING ORAL
Qty: 90 TABLET | Refills: 0 | Status: SHIPPED | OUTPATIENT
Start: 2019-07-17 | End: 2019-10-21 | Stop reason: SDUPTHER

## 2019-07-18 ENCOUNTER — OFFICE VISIT (OUTPATIENT)
Dept: SURGERY | Facility: CLINIC | Age: 39
End: 2019-07-18
Payer: MEDICAID

## 2019-07-18 VITALS
TEMPERATURE: 99 F | HEART RATE: 129 BPM | DIASTOLIC BLOOD PRESSURE: 82 MMHG | BODY MASS INDEX: 21.17 KG/M2 | WEIGHT: 112.13 LBS | HEIGHT: 61 IN | SYSTOLIC BLOOD PRESSURE: 137 MMHG

## 2019-07-18 DIAGNOSIS — D03.59 MELANOMA IN SITU OF SKIN OF TRUNK: Primary | ICD-10-CM

## 2019-07-18 DIAGNOSIS — L81.9 PIGMENTED SKIN LESION: ICD-10-CM

## 2019-07-18 PROCEDURE — 88342 IMHCHEM/IMCYTCHM 1ST ANTB: CPT | Mod: 26,,, | Performed by: PATHOLOGY

## 2019-07-18 PROCEDURE — 99999 PR PBB SHADOW E&M-EST. PATIENT-LVL III: CPT | Mod: PBBFAC,,, | Performed by: SURGERY

## 2019-07-18 PROCEDURE — 99213 OFFICE O/P EST LOW 20 MIN: CPT | Mod: PBBFAC | Performed by: SURGERY

## 2019-07-18 PROCEDURE — 11603 EXC TR-EXT MAL+MARG 2.1-3 CM: CPT | Mod: S$PBB,,, | Performed by: SURGERY

## 2019-07-18 PROCEDURE — 99213 OFFICE O/P EST LOW 20 MIN: CPT | Mod: 25,S$PBB,, | Performed by: SURGERY

## 2019-07-18 PROCEDURE — 11603 EXC TR-EXT MAL+MARG 2.1-3 CM: CPT | Mod: PBBFAC | Performed by: SURGERY

## 2019-07-18 PROCEDURE — 88305 TISSUE EXAM BY PATHOLOGIST: CPT | Performed by: PATHOLOGY

## 2019-07-18 PROCEDURE — 99999 PR PBB SHADOW E&M-EST. PATIENT-LVL III: ICD-10-PCS | Mod: PBBFAC,,, | Performed by: SURGERY

## 2019-07-18 PROCEDURE — 99213 PR OFFICE/OUTPT VISIT, EST, LEVL III, 20-29 MIN: ICD-10-PCS | Mod: 25,S$PBB,, | Performed by: SURGERY

## 2019-07-18 PROCEDURE — 11603 PR EXC SKIN MALIG 2.1-3 CM TRUNK,ARM,LEG: ICD-10-PCS | Mod: S$PBB,,, | Performed by: SURGERY

## 2019-07-18 PROCEDURE — 88342 TISSUE SPECIMEN TO PATHOLOGY, SURGERY: ICD-10-PCS | Mod: 26,,, | Performed by: PATHOLOGY

## 2019-07-18 PROCEDURE — 88305 TISSUE SPECIMEN TO PATHOLOGY, SURGERY: ICD-10-PCS | Mod: 26,,, | Performed by: PATHOLOGY

## 2019-07-18 NOTE — LETTER
July 18, 2019      Iris Simon MD  [address] [facility] [address]           [facility] - General Surgery  [address]  [place] [address]  Phone: [phone]          Patient: Ivy Salgado   MR Number: [id number]   YOB: 1980   Date of Visit: 7/18/2019       Dear Dr. Iris Simon:    Thank you for referring Ivy Salgado to me for evaluation. Attached you will find relevant portions of my assessment and plan of care.    If you have questions, please do not hesitate to call me. I look forward to following Ivy Salgado along with you.    Sincerely,    Gerardo Olvera MD    Enclosure  CC:  No Recipients    If you would like to receive this communication electronically, please contact [email or web address] or [phone] to request more information on [employer] access.    For providers and/or their staff who would like to refer a patient to [facility], please contact us through our one-stop-shop provider referral line, [facility], at [phone].    If you feel you have received this communication in error or would no longer like to receive these types of communications, please e-mail [email or web address]

## 2019-07-18 NOTE — PROGRESS NOTES
History & Physical    SUBJECTIVE:     History of Present Illness:  Patient is a 39 y.o. female with history of severe Crohn's disease s/p total abdominal colectomy with end ileostomy found to have a mole near the site of her ileostomy. 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 Denies fevers, chills, or any other complaints at this time.       Chief Complaint   Patient presents with    Consult       Review of patient's allergies indicates:   Allergen Reactions    Azathioprine sodium Other (See Comments)     Other reaction(s): pancreatitis  Other reaction(s): pancreatitis    Methotrexate analogues Other (See Comments)     leukopenia    Stelara [ustekinumab] Other (See Comments)     Multiple infections    Zofran [ondansetron hcl (pf)] Other (See Comments)     Per patient causes prolong QT    Vancomycin analogues Hives    Morphine Itching and Other (See Comments)     Other reaction(s): Itching    Bactrim [sulfamethoxazole-trimethoprim] Palpitations    Ciprofloxacin Palpitations       Current Outpatient Medications   Medication Sig Dispense Refill    cephALEXin (KEFLEX) 250 MG capsule Take 1 capsule (250 mg total) by mouth once daily. 30 capsule 5    clonazePAM (KLONOPIN) 1 MG tablet TAKE 1 TABLET BY MOUTH TWICE DAILY AS NEEDED FOR ANXIETY 60 tablet 0    diphenoxylate-atropine 2.5-0.025 mg (LOMOTIL) 2.5-0.025 mg per tablet Take 1 tablet by mouth 4 (four) times daily as needed. for diarrhea 100 tablet 0    dronabinol (MARINOL) 5 MG capsule TAKE ONE CAPSULE BY MOUTH TWICE DAILY BEFORE MEALS 60 capsule 0    estradiol (VIVELLE-DOT) 0.1 mg/24 hr PTSW Place 1 patch onto the skin twice a week. 8 patch 11    food supplemt, lactose-reduced (BOOST BREEZE NUTRITIONAL) 0.04-1.05 gram-kcal/mL Liqd Use 3 times per day (Patient taking differently: Use 1 time per day) 6399 mL 5    loperamide (IMODIUM) 2 mg capsule Take 2 mg by mouth daily as needed for Diarrhea.      mirtazapine (REMERON SOL-TAB) 30 MG  disintegrating tablet DISSOLVE 1 TABLET(30 MG) ON THE TONGUE EVERY EVENING 90 tablet 0    multivitamin (ONE DAILY MULTIVITAMIN) per tablet Take 1 tablet by mouth once daily.      oxyCODONE-acetaminophen (PERCOCET)  mg per tablet Take 1 tablet by mouth every 4 (four) hours as needed for Pain. 30 tablet 0    potassium citrate (UROCIT-K 15) 15 mEq TbSR Take 2 tablets by mouth 2 (two) times daily. 360 tablet 3    promethazine (PHENERGAN) 25 MG tablet Take 1 tablet (25 mg total) by mouth every 6 (six) hours as needed. 30 tablet 3    ranitidine (ZANTAC) 150 MG tablet Take 150 mg by mouth 2 (two) times daily as needed for Heartburn.      valACYclovir (VALTREX) 500 MG tablet TAKE 1 TABLET BY MOUTH EVERY DAY 90 tablet 2    zolpidem (AMBIEN) 10 mg Tab TAKE 1 TABLET BY MOUTH EVERY NIGHT AT BEDTIME 30 tablet 0     No current facility-administered medications for this visit.        Past Medical History:   Diagnosis Date    Abnormal Pap smear 2007    Abnormal Pap smear 5/26/2011    Anemia     Anxiety     Arthritis     C. difficile diarrhea     Crohn's disease     Depression 8/5/2017    Encounter for blood transfusion     Genital HSV     History of colposcopy with cervical biopsy 2007 and 7/2011 2007-LYLA I  and 7/2011- LYLA I    Hypertension     Kidney stone     Kidney stone     Recurrent UTI 4/3/2013    S/P ileostomy 7/9/2012    Sterilization 6/23/2012     Past Surgical History:   Procedure Laterality Date    ABDOMINAL SURGERY      APPENDECTOMY      BLADDER SURGERY      partial cystectomy due to fistula    CKC      COLON SURGERY      COLONOSCOPY      EGD (ESOPHAGOGASTRODUODENOSCOPY) N/A 9/6/2013    Performed by Emilio Cameron MD at [facility] (4TH FLR)    ESOPHAGOGASTRODUODENOSCOPY (EGD) N/A 10/14/2016    Performed by Janelle Mcpherson MD at [facility] (2ND FLR)    ESOPHAGOGASTRODUODENOSCOPY (EGD) N/A 10/23/2014    Performed by Nathanael Mitchell MD at [facility] (2ND FLR)    EXAM UNDER  ANESTHESIA N/A 8/29/2013    [facility] at [facility] 2ND FLR    EXAM UNDER ANESTHESIA N/A 1/18/2013    Performed by [facility] at [facility] 2ND FLR    HYSTERECTOMY-ABDOMINAL-TOTAL (SHELLIE) N/A 4/16/2015    [facility]formed [facility] at [facility] OR    ILEOSCOPY N/A 8/8/2017    Performed by Tommie Klein MD at [facility] (2ND FLR)    ILEOSCOPY N/A 10/14/2016    Performed by Janelle [facility]pherson MD at [facility] (2ND FLR)    ILEOSCOPY N/A 9/25/2013    Performed by Emilio Cameron [facility] at [facility] (4TH FLR)    ILEOSTOMY      INCISION AND DRAINAGE, ABSCESS N/A 8/29/2013    Performed by [facility] at [facility] OR 2ND FLR    DMEXFIZSL-DFGV-L-CATH Left 2/21/2017    Performed by Parish Sanchez Jr., MD at [facility] OR 2ND FLR    LAPAROTOMY, EXPLORATORY N/A 5/30/2019    Performed by Rupa German MD at [facility] OR 2ND FLR    LYSIS, ADHESIONS N/A 5/30/2019    Performed by Rupa German MD at [facility] OR 2ND FLR    OOPHORECTOMY Right 04/16/2015    PORTACATH PLACEMENT  02/21/2017    REPAIR-BLADDER  4/16/2015    [facility]formed by [facility] at [facility] OR    SALPINGO-OOPHERECTOMY Right 4/16/2015    [facility]formed by [facility] at [facility] OR    SALPINGO-OOPHORECTOMY, BILATERAL Bilateral 5/30/2019    Performed by Rupa German MD at [facility] OR 2ND FLR    SIGMOIDOSCOPY, FLEXIBLE N/A 2/7/2014    Performed by Erasto Sewell MD at [facility] (2ND FLR)    SKIN BIOPSY      SMALL INTESTINE SURGERY      age 18 Y    TOTAL ABDOMINAL HYSTERECTOMY  04/16/2015    TOTAL COLECTOMY      TUBAL LIGATION  06/06/2012    UPPER GASTROINTESTINAL ENDOSCOPY       Family History   Problem Relation Age of Onset    Colon cancer Father     Cancer Father         colon cancer    Liver cancer Father     Hypertension Mother     Cancer Maternal Grandfather         esophageal      Skin cancer Maternal Grandfather     Endometrial cancer Maternal Aunt     Crohn's disease Brother     Breast cancer Neg Hx   "   Ovarian cancer Neg Hx     Melanoma Neg Hx      Social History     Tobacco Use    Smoking status: Never Smoker    Smokeless tobacco: Never Used   Substance Use Topics    Alcohol use: Not Currently     Alcohol/week: 0.0 oz    Drug use: No        Review of Systems:  Review of Systems   Constitutional: Negative for chills and fever.   HENT: Negative for sore throat.    Eyes: Negative for redness.   Respiratory: Negative for shortness of breath.    Cardiovascular: Negative for chest pain.   Gastrointestinal: Negative for abdominal pain.   Endocrine: Negative for polyuria.   Genitourinary: Negative for dysuria.   Musculoskeletal: Negative for back pain.   Skin: Negative for wound.   Neurological: Negative for headaches.   Psychiatric/Behavioral: Negative for agitation.       OBJECTIVE:     Vital Signs (Most Recent)  Temp: 98.8 °F (37.1 °C) (07/18/19 0825)  Pulse: (!) 129 (07/18/19 0825)  BP: 137/82 (07/18/19 0825)  5' 1" (1.549 m)  50.8 kg (112 lb 1.7 oz)     Physical Exam:  Physical Exam   Constitutional: She appears well-developed and well-nourished. No distress.   HENT:   Head: Normocephalic and atraumatic.   Eyes: EOM are normal.   Neck: Normal range of motion.   Cardiovascular: Normal rate.   Pulmonary/Chest: Effort normal. Tenderness:    Abdominal: Soft. There is no tenderness.   Neurological: She is alert.   Skin: Skin is warm and dry.   Psychiatric: She has a normal mood and affect. Her behavior is normal.   Nursing note and vitals reviewed.          Laboratory  none    FINAL PATHOLOGIC DIAGNOSIS  Skin, right lower abdomen, shave biopsy:  -JUNCTIONAL MELANOCYTIC NEVUS WITH SEVERE ARCHITECTURAL ATYPIA, see comment  -THE LESION EXTENDS TO A PERIPHERAL BIOPSY EDGE  COMMENT: We favor the lesion to represent an early evolving melanoma in -situ. The lesion extends to a  peripheral edge and a re-excision to ensure complete removal may be prudent.    ASSESSMENT/PLAN:     39-year-old female with Crohn's disease " s/p total abdominal colectomy with end ileostomy now with melanoma in situ near ileostomy.     - Plan for local excision to negative margin    Goyo Griffith, PGY2  General Surgery  [phone]      I have personally performed a detailed history and physical examination on this patient. My findings are summarized in the resident's note included in the record.

## 2019-07-19 ENCOUNTER — TELEPHONE (OUTPATIENT)
Dept: SURGERY | Facility: CLINIC | Age: 39
End: 2019-07-19

## 2019-07-19 ENCOUNTER — PATIENT MESSAGE (OUTPATIENT)
Dept: SURGERY | Facility: CLINIC | Age: 39
End: 2019-07-19

## 2019-07-19 ENCOUNTER — PATIENT MESSAGE (OUTPATIENT)
Dept: GYNECOLOGIC ONCOLOGY | Facility: CLINIC | Age: 39
End: 2019-07-19

## 2019-07-19 DIAGNOSIS — K50.819 CROHN'S DISEASE OF SMALL AND LARGE INTESTINES WITH COMPLICATION: ICD-10-CM

## 2019-07-19 RX ORDER — ACETAMINOPHEN AND CODEINE PHOSPHATE 300; 60 MG/1; MG/1
1 TABLET ORAL EVERY 4 HOURS PRN
COMMUNITY
End: 2020-01-31

## 2019-07-19 RX ORDER — OXYCODONE AND ACETAMINOPHEN 10; 325 MG/1; MG/1
1 TABLET ORAL EVERY 4 HOURS PRN
Qty: 30 TABLET | Refills: 0 | Status: CANCELLED | OUTPATIENT
Start: 2019-07-19

## 2019-07-20 RX ORDER — DRONABINOL 5 MG/1
CAPSULE ORAL
Qty: 60 CAPSULE | Refills: 0 | Status: SHIPPED | OUTPATIENT
Start: 2019-07-20 | End: 2019-08-20 | Stop reason: SDUPTHER

## 2019-07-20 RX ORDER — PROMETHAZINE HYDROCHLORIDE 25 MG/1
25 TABLET ORAL EVERY 6 HOURS PRN
Qty: 30 TABLET | Refills: 3 | Status: SHIPPED | OUTPATIENT
Start: 2019-07-20 | End: 2019-10-16 | Stop reason: SDUPTHER

## 2019-07-20 NOTE — PROGRESS NOTES
Procedure Note:    Patient was consented for excision of melanoma in situ and removal of pigmented lesion. Timeout performed. Skin prepped and draped. Local anesthetic instilled. 3cm elliptical incision was made over area of melanoma in situ and carried down to subcutaneous fat. This was then removed from the wound. Stitch superior. 5mm punch was used to remove the pigmented lesion. Patient tolerated well. Minimal blood loss. Wounds were sutured closed with interrupted nylon and vicryl deep dermals. 3cm wound length.     Goyo Griffith, PGY2  General Surgery  [phone]

## 2019-07-21 ENCOUNTER — PATIENT MESSAGE (OUTPATIENT)
Dept: INTERNAL MEDICINE | Facility: CLINIC | Age: 39
End: 2019-07-21

## 2019-07-22 ENCOUNTER — INFUSION (OUTPATIENT)
Dept: INFUSION THERAPY | Facility: OTHER | Age: 39
End: 2019-07-22
Attending: INTERNAL MEDICINE
Payer: MEDICAID

## 2019-07-22 ENCOUNTER — OFFICE VISIT (OUTPATIENT)
Dept: WOUND CARE | Facility: CLINIC | Age: 39
End: 2019-07-22
Payer: MEDICAID

## 2019-07-22 ENCOUNTER — PATIENT MESSAGE (OUTPATIENT)
Dept: GASTROENTEROLOGY | Facility: CLINIC | Age: 39
End: 2019-07-22

## 2019-07-22 VITALS
TEMPERATURE: 99 F | RESPIRATION RATE: 17 BRPM | HEIGHT: 61 IN | OXYGEN SATURATION: 100 % | DIASTOLIC BLOOD PRESSURE: 96 MMHG | BODY MASS INDEX: 21.18 KG/M2 | HEART RATE: 94 BPM | SYSTOLIC BLOOD PRESSURE: 134 MMHG | WEIGHT: 112.19 LBS

## 2019-07-22 DIAGNOSIS — T81.9XXA SURGICAL SITE REACTION, INITIAL ENCOUNTER: ICD-10-CM

## 2019-07-22 DIAGNOSIS — K50.819 CROHN'S DISEASE OF BOTH SMALL AND LARGE INTESTINE WITH COMPLICATION: ICD-10-CM

## 2019-07-22 DIAGNOSIS — F41.9 ANXIETY: ICD-10-CM

## 2019-07-22 DIAGNOSIS — K50.019 CROHN'S DISEASE OF SMALL INTESTINE WITH COMPLICATION: Primary | ICD-10-CM

## 2019-07-22 DIAGNOSIS — Z79.899 ENCOUNTER FOR LONG-TERM (CURRENT) USE OF HIGH-RISK MEDICATION: ICD-10-CM

## 2019-07-22 DIAGNOSIS — Z43.2 ATTENTION TO ILEOSTOMY: Primary | ICD-10-CM

## 2019-07-22 LAB
ALBUMIN SERPL BCP-MCNC: 4.1 G/DL (ref 3.5–5.2)
ALP SERPL-CCNC: 122 U/L (ref 55–135)
ALT SERPL W/O P-5'-P-CCNC: 22 U/L (ref 10–44)
ANION GAP SERPL CALC-SCNC: 10 MMOL/L (ref 8–16)
AST SERPL-CCNC: 28 U/L (ref 10–40)
BASOPHILS # BLD AUTO: 0.03 K/UL (ref 0–0.2)
BASOPHILS NFR BLD: 0.4 % (ref 0–1.9)
BILIRUB SERPL-MCNC: 0.4 MG/DL (ref 0.1–1)
BUN SERPL-MCNC: 8 MG/DL (ref 6–20)
CALCIUM SERPL-MCNC: 9.2 MG/DL (ref 8.7–10.5)
CHLORIDE SERPL-SCNC: 105 MMOL/L (ref 95–110)
CO2 SERPL-SCNC: 23 MMOL/L (ref 23–29)
CREAT SERPL-MCNC: 0.8 MG/DL (ref 0.5–1.4)
DIFFERENTIAL METHOD: ABNORMAL
EOSINOPHIL # BLD AUTO: 0.1 K/UL (ref 0–0.5)
EOSINOPHIL NFR BLD: 1.6 % (ref 0–8)
ERYTHROCYTE [DISTWIDTH] IN BLOOD BY AUTOMATED COUNT: 13.6 % (ref 11.5–14.5)
EST. GFR  (AFRICAN AMERICAN): >60 ML/MIN/1.73 M^2
EST. GFR  (NON AFRICAN AMERICAN): >60 ML/MIN/1.73 M^2
GLUCOSE SERPL-MCNC: 99 MG/DL (ref 70–110)
HCT VFR BLD AUTO: 36.2 % (ref 37–48.5)
HGB BLD-MCNC: 11.8 G/DL (ref 12–16)
IMM GRANULOCYTES # BLD AUTO: 0.02 K/UL (ref 0–0.04)
IMM GRANULOCYTES NFR BLD AUTO: 0.3 % (ref 0–0.5)
LYMPHOCYTES # BLD AUTO: 2.4 K/UL (ref 1–4.8)
LYMPHOCYTES NFR BLD: 34.3 % (ref 18–48)
MCH RBC QN AUTO: 28.3 PG (ref 27–31)
MCHC RBC AUTO-ENTMCNC: 32.6 G/DL (ref 32–36)
MCV RBC AUTO: 87 FL (ref 82–98)
MONOCYTES # BLD AUTO: 0.8 K/UL (ref 0.3–1)
MONOCYTES NFR BLD: 11.6 % (ref 4–15)
NEUTROPHILS # BLD AUTO: 3.6 K/UL (ref 1.8–7.7)
NEUTROPHILS NFR BLD: 51.8 % (ref 38–73)
NRBC BLD-RTO: 0 /100 WBC
PLATELET # BLD AUTO: 351 K/UL (ref 150–350)
PMV BLD AUTO: 9.2 FL (ref 9.2–12.9)
POTASSIUM SERPL-SCNC: 3.1 MMOL/L (ref 3.5–5.1)
PROT SERPL-MCNC: 8.2 G/DL (ref 6–8.4)
RBC # BLD AUTO: 4.17 M/UL (ref 4–5.4)
SODIUM SERPL-SCNC: 138 MMOL/L (ref 136–145)
WBC # BLD AUTO: 6.89 K/UL (ref 3.9–12.7)

## 2019-07-22 PROCEDURE — 96360 HYDRATION IV INFUSION INIT: CPT

## 2019-07-22 PROCEDURE — 99213 PR OFFICE/OUTPT VISIT, EST, LEVL III, 20-29 MIN: ICD-10-PCS | Mod: S$PBB,,, | Performed by: CLINICAL NURSE SPECIALIST

## 2019-07-22 PROCEDURE — 99999 PR PBB SHADOW E&M-EST. PATIENT-LVL II: CPT | Mod: PBBFAC,,, | Performed by: CLINICAL NURSE SPECIALIST

## 2019-07-22 PROCEDURE — 36591 DRAW BLOOD OFF VENOUS DEVICE: CPT

## 2019-07-22 PROCEDURE — 99999 PR PBB SHADOW E&M-EST. PATIENT-LVL II: ICD-10-PCS | Mod: PBBFAC,,, | Performed by: CLINICAL NURSE SPECIALIST

## 2019-07-22 PROCEDURE — 99212 OFFICE O/P EST SF 10 MIN: CPT | Mod: PBBFAC,25 | Performed by: CLINICAL NURSE SPECIALIST

## 2019-07-22 PROCEDURE — 25000003 PHARM REV CODE 250: Performed by: INTERNAL MEDICINE

## 2019-07-22 PROCEDURE — 80053 COMPREHEN METABOLIC PANEL: CPT

## 2019-07-22 PROCEDURE — 85025 COMPLETE CBC W/AUTO DIFF WBC: CPT

## 2019-07-22 PROCEDURE — 63600175 PHARM REV CODE 636 W HCPCS: Performed by: INTERNAL MEDICINE

## 2019-07-22 PROCEDURE — 99213 OFFICE O/P EST LOW 20 MIN: CPT | Mod: S$PBB,,, | Performed by: CLINICAL NURSE SPECIALIST

## 2019-07-22 PROCEDURE — 96361 HYDRATE IV INFUSION ADD-ON: CPT

## 2019-07-22 RX ORDER — ZOLPIDEM TARTRATE 10 MG/1
TABLET ORAL
Qty: 30 TABLET | Refills: 0 | Status: SHIPPED | OUTPATIENT
Start: 2019-07-22 | End: 2019-08-20 | Stop reason: SDUPTHER

## 2019-07-22 RX ORDER — DRONABINOL 5 MG/1
CAPSULE ORAL
Qty: 60 CAPSULE | Refills: 0 | OUTPATIENT
Start: 2019-07-22

## 2019-07-22 RX ORDER — HEPARIN 100 UNIT/ML
500 SYRINGE INTRAVENOUS
Status: COMPLETED | OUTPATIENT
Start: 2019-07-22 | End: 2019-07-22

## 2019-07-22 RX ADMIN — SODIUM CHLORIDE: 0.9 INJECTION, SOLUTION INTRAVENOUS at 10:07

## 2019-07-22 RX ADMIN — SODIUM CHLORIDE: 0.9 INJECTION, SOLUTION INTRAVENOUS at 09:07

## 2019-07-22 RX ADMIN — HEPARIN 500 UNITS: 100 SYRINGE at 11:07

## 2019-07-22 NOTE — PROGRESS NOTES
Subjective:       Patient ID: Ivy Salgado is a 39 y.o. female.    Chief Complaint: Wound Check and Ileostomy    This is well known pt to me and she has had ileo for 6 years or so, recently had a mole biopsied and will now need excision.had this done last week and has been managing with foam under wafer but leaks several times a day     Wound Check       Review of Systems   Constitutional: Negative for activity change, appetite change and fever.   HENT: Negative for congestion and rhinorrhea.    Respiratory: Negative for cough and shortness of breath.    Cardiovascular: Negative.    Gastrointestinal: Negative.    Genitourinary: Negative.    Musculoskeletal: Negative.    Skin: Positive for wound.   Neurological: Negative.    Psychiatric/Behavioral: Negative.        Objective:      Physical Exam   Constitutional: She appears well-developed and well-nourished.   Pulmonary/Chest: Effort normal. No respiratory distress.   Abdominal: Soft. Bowel sounds are normal.   Musculoskeletal: Normal range of motion. She exhibits no edema.   Skin: Skin is warm and dry. No rash noted.   Psychiatric: She has a normal mood and affect.       Pre        7/22 after excision of melanoma   Some redness noted, leaks of pouch daily or more, has extensive bruising as well   Today applied Gentian violet to incision, then HFB ostomy wafer and then covered with DUODERM extra thin wafer and pouched  Discussed diet and her liquid output , gave her rec to try banana flakes for stool thickening.     Assessment:       1. Attention to ileostomy    2. Surgical site reaction, initial encounter        Plan:       HFB to site,   Discussed pouching options with mole removal.  Try banana flakes  I have reviewed the plan of care with the patient  and she express understanding. I spent over 50% of this 15 minute visit in face to face counseling.

## 2019-07-23 ENCOUNTER — OFFICE VISIT (OUTPATIENT)
Dept: WOUND CARE | Facility: CLINIC | Age: 39
End: 2019-07-23
Payer: MEDICAID

## 2019-07-23 ENCOUNTER — PATIENT MESSAGE (OUTPATIENT)
Dept: SURGERY | Facility: CLINIC | Age: 39
End: 2019-07-23

## 2019-07-23 ENCOUNTER — PATIENT MESSAGE (OUTPATIENT)
Dept: GASTROENTEROLOGY | Facility: CLINIC | Age: 39
End: 2019-07-23

## 2019-07-23 DIAGNOSIS — T81.9XXA SURGICAL SITE REACTION, INITIAL ENCOUNTER: ICD-10-CM

## 2019-07-23 DIAGNOSIS — Z43.2 ATTENTION TO ILEOSTOMY: Primary | ICD-10-CM

## 2019-07-23 DIAGNOSIS — L03.311 CELLULITIS OF ABDOMINAL WALL: Primary | ICD-10-CM

## 2019-07-23 DIAGNOSIS — K50.819 CROHN'S DISEASE OF SMALL AND LARGE INTESTINES WITH COMPLICATION: ICD-10-CM

## 2019-07-23 DIAGNOSIS — L03.90 CELLULITIS, UNSPECIFIED CELLULITIS SITE: ICD-10-CM

## 2019-07-23 PROCEDURE — 99999 PR PBB SHADOW E&M-EST. PATIENT-LVL II: CPT | Mod: PBBFAC,,, | Performed by: CLINICAL NURSE SPECIALIST

## 2019-07-23 PROCEDURE — 99999 PR PBB SHADOW E&M-EST. PATIENT-LVL II: ICD-10-PCS | Mod: PBBFAC,,, | Performed by: CLINICAL NURSE SPECIALIST

## 2019-07-23 PROCEDURE — 99212 OFFICE O/P EST SF 10 MIN: CPT | Mod: PBBFAC | Performed by: CLINICAL NURSE SPECIALIST

## 2019-07-23 PROCEDURE — 99213 PR OFFICE/OUTPT VISIT, EST, LEVL III, 20-29 MIN: ICD-10-PCS | Mod: S$PBB,,, | Performed by: CLINICAL NURSE SPECIALIST

## 2019-07-23 PROCEDURE — 99213 OFFICE O/P EST LOW 20 MIN: CPT | Mod: S$PBB,,, | Performed by: CLINICAL NURSE SPECIALIST

## 2019-07-23 RX ORDER — DIPHENOXYLATE HYDROCHLORIDE AND ATROPINE SULFATE 2.5; .025 MG/1; MG/1
1 TABLET ORAL 4 TIMES DAILY PRN
Qty: 100 TABLET | Refills: 0 | Status: SHIPPED | OUTPATIENT
Start: 2019-07-23 | End: 2019-08-29 | Stop reason: SDUPTHER

## 2019-07-23 RX ORDER — OXYCODONE AND ACETAMINOPHEN 10; 325 MG/1; MG/1
1 TABLET ORAL EVERY 4 HOURS PRN
Qty: 30 TABLET | Refills: 0 | Status: SHIPPED | OUTPATIENT
Start: 2019-07-23 | End: 2020-01-31

## 2019-07-23 RX ORDER — DOXYCYCLINE 100 MG/1
100 CAPSULE ORAL 2 TIMES DAILY
Qty: 28 CAPSULE | Refills: 0 | Status: SHIPPED | OUTPATIENT
Start: 2019-07-23 | End: 2019-08-06

## 2019-07-23 NOTE — PROGRESS NOTES
Subjective:       Patient ID: Ivy Salgado is a 39 y.o. female.    Chief Complaint: Wound Check and Ileostomy    This is well known pt to me and she has had ileo for 6 years or so, recently had a mole biopsied and will now need excision.had this done last week and has been managing with foam under wafer but leaks several times a day, she was here yesterday and still had leak during night, she is afraid the site is getting infected but we discussed antibiotic earlier in day, she has decided to come in and have me look and give alternate pouching options.    Wound Check       Review of Systems   Constitutional: Negative for activity change, appetite change and fever.   HENT: Negative for congestion and rhinorrhea.    Respiratory: Negative for cough and shortness of breath.    Cardiovascular: Negative.    Gastrointestinal: Negative.    Genitourinary: Negative.    Musculoskeletal: Negative.    Skin: Positive for wound.   Neurological: Negative.    Psychiatric/Behavioral: Negative.        Objective:      Physical Exam   Constitutional: She appears well-developed and well-nourished.   Pulmonary/Chest: Effort normal. No respiratory distress.   Abdominal: Soft. Bowel sounds are normal.   Musculoskeletal: Normal range of motion. She exhibits no edema.   Skin: Skin is warm and dry. No rash noted.   Psychiatric: She has a normal mood and affect.       Pre        7/22 after excision of melanoma    7/23  Some redness noted, seems bit darker only but still has leaks of pouch daily or more, has extensive bruising as well , she is very emotional and tired from lack of sleep   Today applied  HFB ostomy wafer and then covered with DUODERM extra thin wafer and pouched with CONVEX liz new image, cut to fit and used paste to caulk around the stoma.   Discussed diet and her liquid output ,  She is hesitant on antibiotic and understandable, I am as well, she may wait a day to see how things go. .     Assessment:       1.  Attention to ileostomy    2. Surgical site reaction, initial encounter        Plan:       HFB to site, used larger flange and paste instead of ring.   Discussed pouching options with mole removal. And gave her extra supplies  Try more foods to thicken   I have reviewed the plan of care with the patient  and she express understanding. I spent over 50% of this 15 minute visit in face to face counseling.

## 2019-07-24 ENCOUNTER — DOCUMENTATION ONLY (OUTPATIENT)
Dept: GASTROENTEROLOGY | Facility: CLINIC | Age: 39
End: 2019-07-24

## 2019-07-24 NOTE — PROGRESS NOTES
I called addiction unit  As recommended by Dr Pires  They cannot help because of medicaid  But they will send numbers  I expressed that my goal is to minimize the pain meds

## 2019-07-30 ENCOUNTER — OFFICE VISIT (OUTPATIENT)
Dept: INTERNAL MEDICINE | Facility: CLINIC | Age: 39
End: 2019-07-30
Payer: MEDICAID

## 2019-07-30 ENCOUNTER — INFUSION (OUTPATIENT)
Dept: INFUSION THERAPY | Facility: OTHER | Age: 39
End: 2019-07-30
Attending: INTERNAL MEDICINE
Payer: MEDICAID

## 2019-07-30 VITALS
SYSTOLIC BLOOD PRESSURE: 100 MMHG | DIASTOLIC BLOOD PRESSURE: 72 MMHG | OXYGEN SATURATION: 100 % | HEART RATE: 89 BPM | BODY MASS INDEX: 22.07 KG/M2 | HEIGHT: 61 IN | WEIGHT: 116.88 LBS

## 2019-07-30 VITALS
SYSTOLIC BLOOD PRESSURE: 128 MMHG | HEART RATE: 77 BPM | DIASTOLIC BLOOD PRESSURE: 72 MMHG | TEMPERATURE: 99 F | OXYGEN SATURATION: 98 % | RESPIRATION RATE: 18 BRPM

## 2019-07-30 DIAGNOSIS — F41.1 GENERALIZED ANXIETY DISORDER: ICD-10-CM

## 2019-07-30 DIAGNOSIS — K50.019 CROHN'S DISEASE OF SMALL INTESTINE WITH COMPLICATION: Primary | ICD-10-CM

## 2019-07-30 DIAGNOSIS — F32.0 CURRENT MILD EPISODE OF MAJOR DEPRESSIVE DISORDER WITHOUT PRIOR EPISODE: ICD-10-CM

## 2019-07-30 DIAGNOSIS — E04.2 MULTIPLE THYROID NODULES: ICD-10-CM

## 2019-07-30 DIAGNOSIS — K21.9 GASTROESOPHAGEAL REFLUX DISEASE WITHOUT ESOPHAGITIS: ICD-10-CM

## 2019-07-30 DIAGNOSIS — E55.9 VITAMIN D DEFICIENCY: ICD-10-CM

## 2019-07-30 DIAGNOSIS — F11.90 CHRONIC, CONTINUOUS USE OF OPIOIDS: ICD-10-CM

## 2019-07-30 DIAGNOSIS — N39.0 RECURRENT UTI: Chronic | ICD-10-CM

## 2019-07-30 DIAGNOSIS — N83.202 CYST OF LEFT OVARY: ICD-10-CM

## 2019-07-30 DIAGNOSIS — M85.89 OSTEOPENIA OF MULTIPLE SITES: ICD-10-CM

## 2019-07-30 DIAGNOSIS — Z93.2 S/P ILEOSTOMY: Chronic | ICD-10-CM

## 2019-07-30 PROCEDURE — 96360 HYDRATION IV INFUSION INIT: CPT

## 2019-07-30 PROCEDURE — 99999 PR PBB SHADOW E&M-EST. PATIENT-LVL III: ICD-10-PCS | Mod: PBBFAC,,, | Performed by: INTERNAL MEDICINE

## 2019-07-30 PROCEDURE — 99214 PR OFFICE/OUTPT VISIT, EST, LEVL IV, 30-39 MIN: ICD-10-PCS | Mod: S$PBB,,, | Performed by: INTERNAL MEDICINE

## 2019-07-30 PROCEDURE — 63600175 PHARM REV CODE 636 W HCPCS: Performed by: INTERNAL MEDICINE

## 2019-07-30 PROCEDURE — 99213 OFFICE O/P EST LOW 20 MIN: CPT | Mod: PBBFAC,25 | Performed by: INTERNAL MEDICINE

## 2019-07-30 PROCEDURE — 99214 OFFICE O/P EST MOD 30 MIN: CPT | Mod: S$PBB,,, | Performed by: INTERNAL MEDICINE

## 2019-07-30 PROCEDURE — 96361 HYDRATE IV INFUSION ADD-ON: CPT

## 2019-07-30 PROCEDURE — 99999 PR PBB SHADOW E&M-EST. PATIENT-LVL III: CPT | Mod: PBBFAC,,, | Performed by: INTERNAL MEDICINE

## 2019-07-30 RX ORDER — ACETAMINOPHEN 325 MG/1
650 TABLET ORAL
Status: CANCELLED | OUTPATIENT
Start: 2019-09-03

## 2019-07-30 RX ORDER — EPINEPHRINE 1 MG/ML
0.3 INJECTION, SOLUTION, CONCENTRATE INTRAVENOUS
Status: CANCELLED | OUTPATIENT
Start: 2019-09-03

## 2019-07-30 RX ORDER — METHYLPREDNISOLONE SOD SUCC 125 MG
40 VIAL (EA) INJECTION
Status: CANCELLED | OUTPATIENT
Start: 2019-09-03

## 2019-07-30 RX ORDER — SODIUM CHLORIDE 0.9 % (FLUSH) 0.9 %
10 SYRINGE (ML) INJECTION
Status: CANCELLED | OUTPATIENT
Start: 2019-09-03

## 2019-07-30 RX ORDER — HEPARIN 100 UNIT/ML
500 SYRINGE INTRAVENOUS
Status: CANCELLED | OUTPATIENT
Start: 2019-09-03

## 2019-07-30 RX ORDER — IPRATROPIUM BROMIDE AND ALBUTEROL SULFATE 2.5; .5 MG/3ML; MG/3ML
3 SOLUTION RESPIRATORY (INHALATION)
Status: CANCELLED | OUTPATIENT
Start: 2019-09-03

## 2019-07-30 RX ORDER — DIPHENHYDRAMINE HYDROCHLORIDE 50 MG/ML
25 INJECTION INTRAMUSCULAR; INTRAVENOUS
Status: CANCELLED | OUTPATIENT
Start: 2019-09-03

## 2019-07-30 RX ORDER — HEPARIN 100 UNIT/ML
500 SYRINGE INTRAVENOUS
Status: COMPLETED | OUTPATIENT
Start: 2019-07-30 | End: 2019-07-30

## 2019-07-30 RX ORDER — SODIUM CHLORIDE AND POTASSIUM CHLORIDE 150; 900 MG/100ML; MG/100ML
INJECTION, SOLUTION INTRAVENOUS CONTINUOUS
Status: CANCELLED
Start: 2019-09-03

## 2019-07-30 RX ADMIN — SODIUM CHLORIDE: 0.9 INJECTION, SOLUTION INTRAVENOUS at 09:07

## 2019-07-30 RX ADMIN — HEPARIN 500 UNITS: 100 SYRINGE at 10:07

## 2019-07-30 RX ADMIN — SODIUM CHLORIDE: 0.9 INJECTION, SOLUTION INTRAVENOUS at 08:07

## 2019-07-30 NOTE — PROGRESS NOTES
Subjective:       Patient ID: Ivy Salgado is a 39 y.o. female.    Chief Complaint: Annual Exam    Pt here for f/u after a series of hospitalizations and complicated course over the last few months. She was found to have L adnexal mass and was referred to GYN-onc for surgery due to h/o multiple abdominal surgeries and adhesions. The course was complicated by a pelvic abscess. She has regular f/u with GYN-onc and has improved. The mass was benign. However, she was then found to have mole near ileostomy stoma and required surgery revealing melanoma in situ. She has since had complications of incision contamination from stoma b/c stoma bag does not adhere to skin due to sutures. She is seeing wound care and surgery regularly and trying various solutions. She does have doxycycline right now as area was starting to look erythematous.      She does have anxiety which is fairly well controlled with current meds. She uses xanax many nights. She was seeing psychiatry but trying to est with new provider due to insurance change. No SI/HI.      She has had issues with recurrent UTI in the past but not having any symptoms currently. She is on prophylaxis with keflex. She has seen urology.     Crohn's is fairly well controlled. She has some abd pain with nausea and occasional vomiting for which she has d/w GI but doesn't feel this is active crohns. No fever. She was on entyvio but is being held due to recent surgeries. She sees Dr. Ross with GI regularly. He manages her chronic opiates and is on pain contract.     GERD is well controlled with prn zantac. No associated red flag symptoms.    She has osteopenia on DEXA 1/2018. Reiterated importance of ca/d. We discussed other meds but she is not on prednisone now and osteopenia is mild so will hold off.     She has thyroid nodules that were small on last u/s 10/2018 and not meeting criteria for biopsy. Clinically she is euthyroid. Repeat u/s can be done in 4/2020.     Review of  Systems   Constitutional: Negative for fatigue and unexpected weight change.   Eyes: Negative for visual disturbance.   Respiratory: Negative for shortness of breath.    Cardiovascular: Negative for chest pain.   Gastrointestinal: Positive for abdominal pain, diarrhea and nausea. Negative for vomiting.   Genitourinary: Negative for dysuria and frequency.   Neurological: Negative for numbness and headaches.   Psychiatric/Behavioral: Negative for dysphoric mood.       Objective:      Physical Exam   Constitutional: She is oriented to person, place, and time. She appears well-developed and well-nourished.   HENT:   Head: Normocephalic and atraumatic.   Eyes: Pupils are equal, round, and reactive to light. EOM are normal.   Neck: Carotid bruit is not present. No thyroid mass and no thyromegaly present.   Cardiovascular: Normal rate, regular rhythm, S1 normal, S2 normal and normal heart sounds.   No murmur heard.  Pulmonary/Chest: Effort normal and breath sounds normal. She has no wheezes.   Abdominal: Soft. Bowel sounds are normal. She exhibits no distension and no mass. There is no hepatosplenomegaly. There is tenderness.   Tenderness around stoma and incision but no lit fluctuance/induration/erythema   Musculoskeletal: She exhibits no edema.   Lymphadenopathy:     She has no cervical adenopathy.   Neurological: She is alert and oriented to person, place, and time. No cranial nerve deficit.   Psychiatric: She has a normal mood and affect. Judgment normal.       Assessment:       1. Crohn's disease of small intestine with complication    2. S/P ileostomy    3. Osteopenia of multiple sites    4. Current mild episode of major depressive disorder without prior episode    5. Gastroesophageal reflux disease without esophagitis    6. Vitamin D deficiency    7. Multiple thyroid nodules    8. Recurrent UTI    9. Cyst of left ovary    10. Generalized anxiety disorder    11. Chronic, continuous use of opioids        Plan:        1. Keep f/u with specialists  2. Continue current meds  3. Prior labs reviewed with pt  4. ED and RTC prompts d/w pt and she understands

## 2019-07-30 NOTE — PLAN OF CARE
Problem: Adult Inpatient Plan of Care  Goal: Plan of Care Review  Outcome: Ongoing (interventions implemented as appropriate)  Pt tolerated 2L IVF without issue. VSS. AVS given. Pt d.c home in stable condition with instructions to return 8/6/19. In interim, pt knows to call clinic with any issues.

## 2019-08-01 ENCOUNTER — OFFICE VISIT (OUTPATIENT)
Dept: SURGERY | Facility: CLINIC | Age: 39
End: 2019-08-01
Payer: MEDICAID

## 2019-08-01 ENCOUNTER — OFFICE VISIT (OUTPATIENT)
Dept: WOUND CARE | Facility: CLINIC | Age: 39
End: 2019-08-01
Payer: MEDICAID

## 2019-08-01 VITALS
DIASTOLIC BLOOD PRESSURE: 75 MMHG | BODY MASS INDEX: 22.04 KG/M2 | TEMPERATURE: 99 F | HEIGHT: 61 IN | HEART RATE: 88 BPM | SYSTOLIC BLOOD PRESSURE: 144 MMHG | WEIGHT: 116.75 LBS

## 2019-08-01 DIAGNOSIS — F41.1 GENERALIZED ANXIETY DISORDER: ICD-10-CM

## 2019-08-01 DIAGNOSIS — D03.59 MELANOMA IN SITU OF TORSO EXCLUDING BREAST: Primary | ICD-10-CM

## 2019-08-01 DIAGNOSIS — Z43.2 ATTENTION TO ILEOSTOMY: Primary | ICD-10-CM

## 2019-08-01 PROCEDURE — 99024 PR POST-OP FOLLOW-UP VISIT: ICD-10-PCS | Mod: ,,, | Performed by: SURGERY

## 2019-08-01 PROCEDURE — 99999 PR PBB SHADOW E&M-EST. PATIENT-LVL III: ICD-10-PCS | Mod: PBBFAC,,, | Performed by: SURGERY

## 2019-08-01 PROCEDURE — 99999 PR PBB SHADOW E&M-EST. PATIENT-LVL II: CPT | Mod: PBBFAC,,, | Performed by: CLINICAL NURSE SPECIALIST

## 2019-08-01 PROCEDURE — 99024 POSTOP FOLLOW-UP VISIT: CPT | Mod: ,,, | Performed by: SURGERY

## 2019-08-01 PROCEDURE — 99212 OFFICE O/P EST SF 10 MIN: CPT | Mod: PBBFAC,27 | Performed by: CLINICAL NURSE SPECIALIST

## 2019-08-01 PROCEDURE — 99213 OFFICE O/P EST LOW 20 MIN: CPT | Mod: PBBFAC | Performed by: SURGERY

## 2019-08-01 PROCEDURE — 99999 PR PBB SHADOW E&M-EST. PATIENT-LVL II: ICD-10-PCS | Mod: PBBFAC,,, | Performed by: CLINICAL NURSE SPECIALIST

## 2019-08-01 PROCEDURE — 99212 OFFICE O/P EST SF 10 MIN: CPT | Mod: S$PBB,,, | Performed by: CLINICAL NURSE SPECIALIST

## 2019-08-01 PROCEDURE — 99999 PR PBB SHADOW E&M-EST. PATIENT-LVL III: CPT | Mod: PBBFAC,,, | Performed by: SURGERY

## 2019-08-01 PROCEDURE — 99212 PR OFFICE/OUTPT VISIT, EST, LEVL II, 10-19 MIN: ICD-10-PCS | Mod: S$PBB,,, | Performed by: CLINICAL NURSE SPECIALIST

## 2019-08-01 RX ORDER — CLONAZEPAM 1 MG/1
TABLET ORAL
Qty: 60 TABLET | Refills: 0 | Status: SHIPPED | OUTPATIENT
Start: 2019-08-01 | End: 2019-08-29 | Stop reason: SDUPTHER

## 2019-08-01 NOTE — PROGRESS NOTES
Subjective:  2 weeks post op from melanoma in situ excision near ostomy appliance. Wound healing well. Having issues with seal. Denies fevers or chills.     Objective:  Vitals:    08/01/19 1300   BP: (!) 144/75   Pulse: 88   Temp: 98.8 °F (37.1 °C)     Incision well healing. No evidence of infection.     Assessment and Plan:  Post op melanoma in situ excision. Doing well.     -remove sutures in clinic  -follow up wound care today  -follow up as needed.     Goyo Griffith, PGY2  General Surgery  [phone]      I have personally performed a detailed history and physical examination on this patient. My findings are summarized in the resident's note included in the record.

## 2019-08-01 NOTE — PROGRESS NOTES
Subjective:       Patient ID: Ivy Salgado is a 39 y.o. female.    Chief Complaint: Ileostomy and Wound Check    This is well known pt to me and she has had ileo for 6 years or so, here for skin check as sutures removed earlier today, pathology clear.   PSH recently had a mole biopsied and will now need excision.had this done last week and has been managing with foam under wafer but leaks several times a day, she was here yesterday and still had leak during night, she is afraid the site is getting infected .    Wound Check       Review of Systems   Constitutional: Negative for activity change, appetite change and fever.   HENT: Negative for congestion and rhinorrhea.    Respiratory: Negative for cough and shortness of breath.    Cardiovascular: Negative.    Gastrointestinal: Negative.    Genitourinary: Negative.    Musculoskeletal: Negative.    Skin: Positive for wound.   Neurological: Negative.    Psychiatric/Behavioral: Negative.        Objective:      Physical Exam   Constitutional: She appears well-developed and well-nourished.   Pulmonary/Chest: Effort normal. No respiratory distress.   Abdominal: Soft. Bowel sounds are normal.   Musculoskeletal: Normal range of motion. She exhibits no edema.   Skin: Skin is warm and dry. No rash noted.   Psychiatric: She has a normal mood and affect.       Pre        7/22 after excision of melanoma    7/23 8/1/19    Sutures out, healing well, will cover with thin hydrocolloid and pch ,   Some redness noted, seems bit darker only but still has leaks of pouch daily or more, has extensive bruising as well , she is very emotional and tired from lack of sleep   Today applied  HFB ostomy wafer and then covered with DUODERM extra thin wafer and pouched with CONVEX liz new image, cut to fit and used paste to caulk around the stoma.   Discussed diet and her liquid output ,   .     Assessment:       1. Attention to ileostomy        Plan:       HFB to site, used larger  flange and paste instead of ring.   Discussed pouching options with mole removal. And gave her extra supplies  Try more foods to thicken   I have reviewed the plan of care with the patient  and she express understanding. I spent over 50% of this 10 minute visit in face to face counseling.

## 2019-08-02 NOTE — ASSESSMENT & PLAN NOTE
Differential includes active Crohn's disease, obstruction.  Pt afebrile and without leukocytosis.  CMP with no acute abnormalities. CRP WNL at 0.9. Lipase WNL at 36. Lactic acid WNL at 1.2. UA unremarkable. ESR 23 and blood culture x 2 with NGTD.  GI consulted: check c.diff (neg), stool culture (prelim no growth), fecal lactoferrin (in process); UA fairly unremarkable (will repeat); would give patient a full liquid diet and advance as tolerated   Supportive care with pain medication (attempting to limit) and IVF.  8/7 Dr. Ross would like to proceed with ileoscopy on 8/8.  GI will place 1/2 prep orders.  NPO after midnight.       52M , domiciled with sister, unemployed on SSI, as per chart review psychiatric history of antisocial personality d/o and self reported past psychiatric h/o schizoaffective d/o bipolar type, OCD, PTSD, 4 past psychiatric state hospitalizations (most recent 2017 in [place] x 3 weeks),  extensive legal history with most recent incarceration in [place] (released April 2019), no past SA, h/o cutting behavior, and no reported substance use h/o (however per chart review prior cocaine use) presenting to the ED for suicidal ideations.   Although there currently appears be a high suspicion for malingering given previous ED admissions and vague inconsistent history, pt with multiple risk factors including poor social support, recent incarceration, previous [facility] admissions, previous h/o SIB, inability to engage in safety planning, and housing instability and thus requires IPP admission for further treatment and stabilization.

## 2019-08-05 LAB
ACID FAST MOD KINY STN SPEC: NORMAL
MYCOBACTERIUM SPEC QL CULT: NORMAL

## 2019-08-14 ENCOUNTER — PATIENT MESSAGE (OUTPATIENT)
Dept: WOUND CARE | Facility: CLINIC | Age: 39
End: 2019-08-14

## 2019-08-14 ENCOUNTER — PATIENT MESSAGE (OUTPATIENT)
Dept: DERMATOLOGY | Facility: CLINIC | Age: 39
End: 2019-08-14

## 2019-08-15 DIAGNOSIS — B37.2 SKIN YEAST INFECTION: Primary | ICD-10-CM

## 2019-08-15 RX ORDER — FLUCONAZOLE 100 MG/1
100 TABLET ORAL DAILY
Qty: 7 TABLET | Refills: 0 | Status: SHIPPED | OUTPATIENT
Start: 2019-08-15 | End: 2019-08-22

## 2019-08-20 DIAGNOSIS — K50.819 CROHN'S DISEASE OF BOTH SMALL AND LARGE INTESTINE WITH COMPLICATION: ICD-10-CM

## 2019-08-20 DIAGNOSIS — Z79.899 ENCOUNTER FOR LONG-TERM (CURRENT) USE OF HIGH-RISK MEDICATION: ICD-10-CM

## 2019-08-20 DIAGNOSIS — F41.9 ANXIETY: ICD-10-CM

## 2019-08-20 RX ORDER — ZOLPIDEM TARTRATE 10 MG/1
TABLET ORAL
Qty: 30 TABLET | Refills: 0 | Status: SHIPPED | OUTPATIENT
Start: 2019-08-20 | End: 2019-09-18 | Stop reason: SDUPTHER

## 2019-08-20 RX ORDER — DRONABINOL 5 MG/1
CAPSULE ORAL
Qty: 60 CAPSULE | Refills: 0 | Status: SHIPPED | OUTPATIENT
Start: 2019-08-20 | End: 2019-09-19 | Stop reason: SDUPTHER

## 2019-08-21 ENCOUNTER — PATIENT MESSAGE (OUTPATIENT)
Dept: GASTROENTEROLOGY | Facility: CLINIC | Age: 39
End: 2019-08-21

## 2019-08-28 ENCOUNTER — LAB VISIT (OUTPATIENT)
Dept: LAB | Facility: HOSPITAL | Age: 39
End: 2019-08-28
Attending: INTERNAL MEDICINE
Payer: MEDICAID

## 2019-08-28 ENCOUNTER — OFFICE VISIT (OUTPATIENT)
Dept: ENDOCRINOLOGY | Facility: CLINIC | Age: 39
End: 2019-08-28
Payer: MEDICAID

## 2019-08-28 ENCOUNTER — PATIENT MESSAGE (OUTPATIENT)
Dept: INTERNAL MEDICINE | Facility: CLINIC | Age: 39
End: 2019-08-28

## 2019-08-28 ENCOUNTER — PATIENT MESSAGE (OUTPATIENT)
Dept: ENDOCRINOLOGY | Facility: CLINIC | Age: 39
End: 2019-08-28

## 2019-08-28 VITALS
WEIGHT: 116.75 LBS | BODY MASS INDEX: 22.04 KG/M2 | SYSTOLIC BLOOD PRESSURE: 110 MMHG | DIASTOLIC BLOOD PRESSURE: 74 MMHG | HEART RATE: 95 BPM | HEIGHT: 61 IN

## 2019-08-28 DIAGNOSIS — R41.89 COGNITIVE CHANGE: ICD-10-CM

## 2019-08-28 DIAGNOSIS — E89.40 PREMATURE SURGICAL MENOPAUSE: Primary | ICD-10-CM

## 2019-08-28 DIAGNOSIS — E89.40 PREMATURE SURGICAL MENOPAUSE: ICD-10-CM

## 2019-08-28 DIAGNOSIS — E55.9 VITAMIN D DEFICIENCY: ICD-10-CM

## 2019-08-28 DIAGNOSIS — R25.1 TREMOR: Primary | ICD-10-CM

## 2019-08-28 DIAGNOSIS — M85.89 OSTEOPENIA OF MULTIPLE SITES: ICD-10-CM

## 2019-08-28 LAB
ESTRADIOL SERPL-MCNC: 27 PG/ML
FSH SERPL-ACNC: 37.6 MIU/ML

## 2019-08-28 PROCEDURE — 99213 OFFICE O/P EST LOW 20 MIN: CPT | Mod: S$PBB,,, | Performed by: INTERNAL MEDICINE

## 2019-08-28 PROCEDURE — 36415 COLL VENOUS BLD VENIPUNCTURE: CPT

## 2019-08-28 PROCEDURE — 99214 OFFICE O/P EST MOD 30 MIN: CPT | Mod: PBBFAC | Performed by: INTERNAL MEDICINE

## 2019-08-28 PROCEDURE — 99999 PR PBB SHADOW E&M-EST. PATIENT-LVL IV: ICD-10-PCS | Mod: PBBFAC,,, | Performed by: INTERNAL MEDICINE

## 2019-08-28 PROCEDURE — 99999 PR PBB SHADOW E&M-EST. PATIENT-LVL IV: CPT | Mod: PBBFAC,,, | Performed by: INTERNAL MEDICINE

## 2019-08-28 PROCEDURE — 82670 ASSAY OF TOTAL ESTRADIOL: CPT

## 2019-08-28 PROCEDURE — 83001 ASSAY OF GONADOTROPIN (FSH): CPT

## 2019-08-28 PROCEDURE — 99213 PR OFFICE/OUTPT VISIT, EST, LEVL III, 20-29 MIN: ICD-10-PCS | Mod: S$PBB,,, | Performed by: INTERNAL MEDICINE

## 2019-08-28 NOTE — Clinical Note
Dear Dr. Morales, I am seeing Ms. Salgado for her low bone mass. I was wondering if you would consider giving her higher doses of hormone replacement given her age and her low bone mass?I hesitate to treat with Bisphosphonate ( her recent fractures are foot fractures only and not on steroids.)  Please le me know your thoughts, Best, SD

## 2019-08-28 NOTE — PROGRESS NOTES
"Subjective:      Patient ID: Ivy Salgado is a 39 y.o. female.    Chief Complaint:  low bone density      History of Present Illness    Ms. Salgado is a 39 y.o. female who is here for a follow-up visit for evaluation of  low bone mass with atrumatic non-vertebral fractures (two foot and collarbone fractures) while on prednisone 10 mg for crohn's disease and episodic symptoms that include flushing, palpitations and heat intolerance, this is associated with high blood pressure.     Today, she reports recent surgery to remove right ovarian remnant and left ovary.   She continues to have episodic symptoms. She was evaluated by neurology for seizures and recommended to start depakote. Unable to get follow up.      No known precipitants of these episodes (alcohol -- stopped drinking socially two years ago). Pheochromocytoma is negative, scans are negative. Able to go through surgery without blood pressure instability.      Diagnosed with bone density from 1/2018.     Started topical hormones in June. Testosterone/dheas/progesterone and estrogen topically four clicks (e/p/dheas) and two clicks (testosterone).    Fracture history:   Right second toe 3/2019, foot fracture 2018.  In a bounce house that was wet, slipped and fractured toe  This occurred while getting up from a chair and accidentally hit coffee table with her foot. X ray done at urgent care demonstrated fracture.   Previous foot fracture occurred 2016. Prior to this fracture collar bone in 2010 while "horsing around," fell on to a couch and sustained fracture.      Denies prednisone use recently.   Diagnosed Crohn's diagnosis 27 years ago.      2015: right ovary and uterus removed  2019: cystic material left ovary    Supplements:  OTC vitamin D 4000 IUs daily (2 a day) -- has an ostomy in place does notice a change in her output (oily)      Review of Systems   Constitutional: Negative for fever.   HENT: Negative for congestion.    Eyes: Negative for visual " "disturbance.   Respiratory: Negative for shortness of breath.    Cardiovascular: Negative for chest pain.   Gastrointestinal: Negative for abdominal pain.   Genitourinary: Negative for dysuria.   Musculoskeletal: Negative for arthralgias.   Skin: Negative for rash.   Neurological: Negative for weakness.   Hematological: Does not bruise/bleed easily.   Psychiatric/Behavioral: Negative for sleep disturbance.       Objective:   Physical Exam   Constitutional: She is oriented to person, place, and time. She appears well-developed and well-nourished. No distress.   Eyes: Pupils are equal, round, and reactive to light. Conjunctivae and EOM are normal. No scleral icterus.       Neck: Normal range of motion. Neck supple. No tracheal deviation present. No thyromegaly present.   Cardiovascular: Normal rate and intact distal pulses.   Pulmonary/Chest: Effort normal and breath sounds normal.   Lymphadenopathy:     She has no cervical adenopathy.   Neurological: She is alert and oriented to person, place, and time. She has normal reflexes.   No tremor.   Skin: Skin is warm and dry. No rash noted.   Psychiatric: She has a normal mood and affect.     Vitals:    08/28/19 1337   BP: 110/74   Pulse: 95   Weight: 53 kg (116 lb 11.7 oz)   Height: 5' 1" (1.549 m)       BP Readings from Last 3 Encounters:   08/28/19 110/74   08/01/19 (!) 144/75   07/30/19 100/72     Wt Readings from Last 1 Encounters:   08/28/19 1337 53 kg (116 lb 11.7 oz)         Body mass index is 22.06 kg/m².    Lab Review:   Lab Results   Component Value Date    HGBA1C 4.8 09/18/2018     Lab Results   Component Value Date    CHOL 225 (H) 10/04/2016    HDL 62 10/04/2016    LDLCALC 133.2 10/04/2016    TRIG 149 10/04/2016    CHOLHDL 27.6 10/04/2016     Lab Results   Component Value Date     07/22/2019    K 3.1 (L) 07/22/2019     07/22/2019    CO2 23 07/22/2019    GLU 99 07/22/2019    BUN 8 07/22/2019    CREATININE 0.8 07/22/2019    CALCIUM 9.2 07/22/2019    " PROT 8.2 07/22/2019    ALBUMIN 4.1 07/22/2019    BILITOT 0.4 07/22/2019    ALKPHOS 122 07/22/2019    AST 28 07/22/2019    ALT 22 07/22/2019    ANIONGAP 10 07/22/2019    ESTGFRAFRICA >60 07/22/2019    EGFRNONAA >60 07/22/2019    TSH 1.802 06/13/2019         Assessment and Plan     Osteopenia of multiple sites  No recent fractures   If steroids to start BP  On topical hormone replacement, consider premenopausal doses for bone protection -- will defer to gyn   Has total colectomy, nutrition is an issue, has chronic D insufficiency -- high dose replacement as much as possible    DXA in 2020    Vitamin D deficiency  Continue high dose replacement    Premature surgical menopause  Continue hormone replacement -- higher doses, unclear if able to achieve E levels in premenopausal doses with topicals.  Check FSH and estradiol levels today

## 2019-08-29 DIAGNOSIS — F41.1 GENERALIZED ANXIETY DISORDER: ICD-10-CM

## 2019-08-29 DIAGNOSIS — K50.819 CROHN'S DISEASE OF SMALL AND LARGE INTESTINES WITH COMPLICATION: ICD-10-CM

## 2019-08-29 RX ORDER — DIPHENOXYLATE HYDROCHLORIDE AND ATROPINE SULFATE 2.5; .025 MG/1; MG/1
1 TABLET ORAL 4 TIMES DAILY PRN
Qty: 100 TABLET | Refills: 0 | Status: SHIPPED | OUTPATIENT
Start: 2019-08-29 | End: 2019-11-14 | Stop reason: SDUPTHER

## 2019-08-29 RX ORDER — CLONAZEPAM 1 MG/1
TABLET ORAL
Qty: 60 TABLET | Refills: 0 | Status: SHIPPED | OUTPATIENT
Start: 2019-08-29 | End: 2019-09-27 | Stop reason: SDUPTHER

## 2019-09-03 NOTE — ASSESSMENT & PLAN NOTE
No recent fractures   If steroids to start BP  On topical hormone replacement, consider premenopausal doses for bone protection -- will defer to gyn   Has total colectomy, nutrition is an issue, has chronic D insufficiency -- high dose replacement as much as possible    DXA in 2020

## 2019-09-03 NOTE — ASSESSMENT & PLAN NOTE
Continue hormone replacement -- higher doses, unclear if able to achieve E levels in premenopausal doses with topicals.  Check FSH and estradiol levels today

## 2019-09-04 ENCOUNTER — TELEPHONE (OUTPATIENT)
Dept: OBSTETRICS AND GYNECOLOGY | Facility: CLINIC | Age: 39
End: 2019-09-04

## 2019-09-04 DIAGNOSIS — Z79.890 SURGICAL MENOPAUSE ON HORMONE REPLACEMENT THERAPY: Primary | ICD-10-CM

## 2019-09-04 DIAGNOSIS — M85.80 OSTEOPENIA, UNSPECIFIED LOCATION: ICD-10-CM

## 2019-09-04 DIAGNOSIS — E89.40 SURGICAL MENOPAUSE ON HORMONE REPLACEMENT THERAPY: Primary | ICD-10-CM

## 2019-09-04 NOTE — TELEPHONE ENCOUNTER
Low bone density and low estradiol TRC regarding both of these issues and needed to know had she ever taken the bioidentical hormones initiated through [facility]. We need to get her on some form of estrogen or at least increase her dose. Will also send my chart message

## 2019-09-04 NOTE — TELEPHONE ENCOUNTER
Spoke with Sameera at [facility] and will try oral form since there is not correlation between saliva and blood. Will coordinate another saliva and blood in 6 weeks  Orders signed

## 2019-09-04 NOTE — TELEPHONE ENCOUNTER
----- Message from Jessica Schilling sent at 9/4/2019  9:43 AM CDT -----  Contact: pt  Pt missed a call and would the nurse to return their call.    Pt can be reached at [phone]

## 2019-09-12 ENCOUNTER — PATIENT MESSAGE (OUTPATIENT)
Dept: UROLOGY | Facility: CLINIC | Age: 39
End: 2019-09-12

## 2019-09-13 ENCOUNTER — OFFICE VISIT (OUTPATIENT)
Dept: DERMATOLOGY | Facility: CLINIC | Age: 39
End: 2019-09-13
Payer: MEDICAID

## 2019-09-13 DIAGNOSIS — D22.4 MELANOCYTIC NEVUS OF SCALP: ICD-10-CM

## 2019-09-13 DIAGNOSIS — D48.5 NEOPLASM OF UNCERTAIN BEHAVIOR OF SKIN: Primary | ICD-10-CM

## 2019-09-13 DIAGNOSIS — L82.1 SEBORRHEIC KERATOSIS: ICD-10-CM

## 2019-09-13 DIAGNOSIS — D22.60 MULTIPLE BENIGN MELANOCYTIC NEVI OF UPPER EXTREMITY, LOWER EXTREMITY, AND TRUNK: ICD-10-CM

## 2019-09-13 DIAGNOSIS — L81.4 LENTIGINES: ICD-10-CM

## 2019-09-13 DIAGNOSIS — Z86.006 HISTORY OF MELANOMA IN SITU: ICD-10-CM

## 2019-09-13 DIAGNOSIS — D22.70 MULTIPLE BENIGN MELANOCYTIC NEVI OF UPPER EXTREMITY, LOWER EXTREMITY, AND TRUNK: ICD-10-CM

## 2019-09-13 DIAGNOSIS — D22.5 MULTIPLE BENIGN MELANOCYTIC NEVI OF UPPER EXTREMITY, LOWER EXTREMITY, AND TRUNK: ICD-10-CM

## 2019-09-13 PROCEDURE — 88305 TISSUE EXAM BY PATHOLOGIST: CPT | Performed by: PATHOLOGY

## 2019-09-13 PROCEDURE — 88342 TISSUE SPECIMEN TO PATHOLOGY, DERMATOLOGY: ICD-10-PCS | Mod: 26,,, | Performed by: PATHOLOGY

## 2019-09-13 PROCEDURE — 99214 PR OFFICE/OUTPT VISIT, EST, LEVL IV, 30-39 MIN: ICD-10-PCS | Mod: 25,S$GLB,, | Performed by: DERMATOLOGY

## 2019-09-13 PROCEDURE — 88305 TISSUE SPECIMEN TO PATHOLOGY, DERMATOLOGY: ICD-10-PCS | Mod: 26,,, | Performed by: PATHOLOGY

## 2019-09-13 PROCEDURE — 11102 PR TANGENTIAL BIOPSY, SKIN, SINGLE LESION: ICD-10-PCS | Mod: S$GLB,,, | Performed by: DERMATOLOGY

## 2019-09-13 PROCEDURE — 88342 IMHCHEM/IMCYTCHM 1ST ANTB: CPT | Mod: 26,,, | Performed by: PATHOLOGY

## 2019-09-13 PROCEDURE — 11102 TANGNTL BX SKIN SINGLE LES: CPT | Mod: S$GLB,,, | Performed by: DERMATOLOGY

## 2019-09-13 PROCEDURE — 99214 OFFICE O/P EST MOD 30 MIN: CPT | Mod: 25,S$GLB,, | Performed by: DERMATOLOGY

## 2019-09-13 NOTE — PROGRESS NOTES
Subjective:       Patient ID:  Ivy Salgado is a 39 y.o. female who presents for   Chief Complaint   Patient presents with    Mole     hx MMIS     This is a high risk patient with a history of severely atypical nevus/early evolving melanoma in situ (right lower abdomen, 5/23/2019) who is here today to check for the development of new lesions.    Mole  - Initial  Affected locations: diffuse  Duration: 10 years  Signs and Symptoms: one on buttock may be changing.  Severity: mild  Timing: constant  Aggravated by: pressure and friction  Relieving factors/Treatments tried: nothing      Review of Systems   Constitutional: Negative for fever and chills.   Skin: Negative for itching and rash.   Hematologic/Lymphatic: Negative for adenopathy. Does not bruise/bleed easily.        Objective:    Physical Exam   Constitutional: She appears well-developed and well-nourished. No distress.   HENT:   Mouth/Throat: Lips normal.    Eyes: Lids are normal.  No conjunctival no injection.   Lymphadenopathy: No supraclavicular adenopathy is present.     She has no cervical adenopathy.     She has no axillary adenopathy.     She has no inguinal adenopathy.   Neurological: She is alert and oriented to person, place, and time. She is not disoriented.   Psychiatric: She has a normal mood and affect.   Skin:   Areas Examined (abnormalities noted in diagram):   Scalp / Hair Palpated and Inspected  Head / Face Inspection Performed  Neck Inspection Performed  Chest / Axilla Inspection Performed  Abdomen Inspection Performed  Genitals / Buttocks / Groin Inspection Performed  Back Inspection Performed  RUE Inspected  LUE Inspection Performed  RLE Inspected  LLE Inspection Performed  Nails and Digits Inspection Performed                       Diagram Legend     Erythematous scaling macule/papule c/w actinic keratosis       Vascular papule c/w angioma      Pigmented verrucoid papule/plaque c/w seborrheic keratosis      Yellow umbilicated papule  c/w sebaceous hyperplasia      Irregularly shaped tan macule c/w lentigo     1-2 mm smooth white papules consistent with Milia      Movable subcutaneous cyst with punctum c/w epidermal inclusion cyst      Subcutaneous movable cyst c/w pilar cyst      Firm pink to brown papule c/w dermatofibroma      Pedunculated fleshy papule(s) c/w skin tag(s)      Evenly pigmented macule c/w junctional nevus     Mildly variegated pigmented, slightly irregular-bordered macule c/w mildly atypical nevus      Flesh colored to evenly pigmented papule c/w intradermal nevus       Pink pearly papule/plaque c/w basal cell carcinoma      Erythematous hyperkeratotic cursted plaque c/w SCC      Surgical scar with no sign of skin cancer recurrence      Open and closed comedones      Inflammatory papules and pustules      Verrucoid papule consistent consistent with wart     Erythematous eczematous patches and plaques     Dystrophic onycholytic nail with subungual debris c/w onychomycosis     Umbilicated papule    Erythematous-base heme-crusted tan verrucoid plaque consistent with inflamed seborrheic keratosis     Erythematous Silvery Scaling Plaque c/w Psoriasis     See annotation      Assessment / Plan:      Pathology Orders:     Normal Orders This Visit    Tissue Specimen To Pathology, Dermatology     Questions:    Directional Terms:  Other(comment)    Clinical Information:  r/o dysplastic vs irritated nevus    Specific Site:  left buttock        Neoplasm of uncertain behavior of skin  -     Tissue Specimen To Pathology, Dermatology    Shave biopsy procedure note:  Risk, benefits, and alternatives of biopsy are discussed with the patient, including risk of infection, scar, recurrence, and need for additional treatment of site. The patient agrees to the procedure by verbal consent. The area is marked and prepped with alcohol.  Approximately 1 mL of lidocaine 1% with epinephrine is used for local anesthesia. A sharp blade is used to remove a  portion of the lesion. The specimen is sent for pathology. Hemostasis is obtained with aluminum chloride and/or monopolar hyfrecation if needed. The area is then dressed and bandaged. The patient tolerated the procedure well without adverse event. Written instructions on wound care were given and were reviewed with the patient, who is to call for any signs of bleeding or infection. The patient will be notified of the pathology results.    Melanocytic nevus of scalp  Multiple benign melanocytic nevi of upper extremity, lower extremity, and trunk  Multiple benign-appearing nevi present on exam today. Reassurance provided. Counseled patient to periodically examine moles and return to clinic if any changes in size, shape, or color are noted or if it becomes symptomatic (bleeding, itching, pain, etc).    Lentigines  These are benign sun spots which should be monitored for changes. Daily sun protection will reduce the number of new lesions.   Patient instructed in importance of daily broad-spectrum sunscreen use with SPF of at least 30. Sun avoidance and topical protection/protective clothing discussed.    Seborrheic keratosis  These are benign, inherited growths without a malignant potential. Reassurance given to patient. No treatment is necessary. Handout was provided.    History of melanoma in situ  Area of previous melanoma examined. Site well-healed with no signs of recurrence.  No lymphadenopathy palpated on exam today.  Total body skin examination performed today including at least 12 points as noted in physical examination. Recommended continuing routine skin exams as well as routine checks with ophthalmology, dentist, and OB/GYN (if female) for any concerning lesions.  Patient instructed in importance of daily broad-spectrum sunscreen use with SPF of at least 30. Sun avoidance and topical protection/protective clothing discussed.      Follow up in about 3 months (around 12/13/2019) for TBSE, or sooner dependent  on pathology results.

## 2019-09-16 ENCOUNTER — PATIENT MESSAGE (OUTPATIENT)
Dept: GASTROENTEROLOGY | Facility: CLINIC | Age: 39
End: 2019-09-16

## 2019-09-16 DIAGNOSIS — K50.819 CROHN'S DISEASE OF SMALL AND LARGE INTESTINES WITH COMPLICATION: ICD-10-CM

## 2019-09-16 RX ORDER — OXYCODONE AND ACETAMINOPHEN 10; 325 MG/1; MG/1
1 TABLET ORAL EVERY 4 HOURS PRN
Qty: 30 TABLET | Refills: 0 | OUTPATIENT
Start: 2019-09-16

## 2019-09-16 NOTE — TELEPHONE ENCOUNTER
Patient is aware refill request was denied. Patient needs to make an appointment first per Dr. Ross.     Message printed for Mrs. Barton.

## 2019-09-18 DIAGNOSIS — F41.9 ANXIETY: ICD-10-CM

## 2019-09-18 RX ORDER — CEPHALEXIN 250 MG/1
CAPSULE ORAL
Qty: 30 CAPSULE | Refills: 0 | Status: SHIPPED | OUTPATIENT
Start: 2019-09-18 | End: 2019-10-21 | Stop reason: SDUPTHER

## 2019-09-18 RX ORDER — ZOLPIDEM TARTRATE 10 MG/1
TABLET ORAL
Qty: 30 TABLET | Refills: 1 | Status: SHIPPED | OUTPATIENT
Start: 2019-09-18 | End: 2019-11-14 | Stop reason: SDUPTHER

## 2019-09-19 ENCOUNTER — PATIENT MESSAGE (OUTPATIENT)
Dept: WOUND CARE | Facility: CLINIC | Age: 39
End: 2019-09-19

## 2019-09-19 DIAGNOSIS — Z79.899 ENCOUNTER FOR LONG-TERM (CURRENT) USE OF HIGH-RISK MEDICATION: ICD-10-CM

## 2019-09-19 DIAGNOSIS — K50.819 CROHN'S DISEASE OF BOTH SMALL AND LARGE INTESTINE WITH COMPLICATION: ICD-10-CM

## 2019-09-19 RX ORDER — DRONABINOL 5 MG/1
CAPSULE ORAL
Qty: 60 CAPSULE | Refills: 0 | Status: SHIPPED | OUTPATIENT
Start: 2019-09-19 | End: 2019-10-16 | Stop reason: SDUPTHER

## 2019-09-20 ENCOUNTER — OFFICE VISIT (OUTPATIENT)
Dept: WOUND CARE | Facility: CLINIC | Age: 39
End: 2019-09-20
Payer: MEDICAID

## 2019-09-20 DIAGNOSIS — T81.89XA NON-HEALING SURGICAL WOUND, INITIAL ENCOUNTER: Primary | ICD-10-CM

## 2019-09-20 PROCEDURE — 99999 PR PBB SHADOW E&M-EST. PATIENT-LVL II: ICD-10-PCS | Mod: PBBFAC,,, | Performed by: CLINICAL NURSE SPECIALIST

## 2019-09-20 PROCEDURE — 99999 PR PBB SHADOW E&M-EST. PATIENT-LVL II: CPT | Mod: PBBFAC,,, | Performed by: CLINICAL NURSE SPECIALIST

## 2019-09-20 PROCEDURE — 99212 PR OFFICE/OUTPT VISIT, EST, LEVL II, 10-19 MIN: ICD-10-PCS | Mod: S$PBB,,, | Performed by: CLINICAL NURSE SPECIALIST

## 2019-09-20 PROCEDURE — 99212 OFFICE O/P EST SF 10 MIN: CPT | Mod: PBBFAC | Performed by: CLINICAL NURSE SPECIALIST

## 2019-09-20 PROCEDURE — 99212 OFFICE O/P EST SF 10 MIN: CPT | Mod: S$PBB,,, | Performed by: CLINICAL NURSE SPECIALIST

## 2019-09-20 NOTE — PROGRESS NOTES
Subjective:       Patient ID: Ivy Salgado is a 39 y.o. female.    Chief Complaint: Wound Check    This is well known pt to me and she has had ileo for 6 years or so,  She had another mole checked and biopsied last week on her left buttock . The wound left is painful and she wanted a plan of care besides vaseline.   PSH recently had a mole biopsied and will now need excision.had this done last week and has been managing with foam under wafer but leaks several times a day, she was here yesterday and still had leak during night, she is afraid the site is getting infected .    Wound Check       Review of Systems   Constitutional: Negative for activity change, appetite change and fever.   HENT: Negative for congestion and rhinorrhea.    Respiratory: Negative for cough and shortness of breath.    Cardiovascular: Negative.    Gastrointestinal: Negative.    Genitourinary: Negative.    Musculoskeletal: Negative.    Skin: Positive for wound.   Neurological: Negative.    Psychiatric/Behavioral: Negative.        Objective:      Physical Exam   Constitutional: She appears well-developed and well-nourished.   Pulmonary/Chest: Effort normal. No respiratory distress.   Abdominal: Soft. Bowel sounds are normal.   Musculoskeletal: Normal range of motion. She exhibits no edema.   Skin: Skin is warm and dry. No rash noted.   Psychiatric: She has a normal mood and affect.     left gluteal with a 6mm x 6 mm ulceration from biopsy , it is yellow base and painful applied ENDOFORM and duoderm thin . Can leave for 3 or so days and redo    .     Assessment:       1. Non-healing surgical wound, initial encounter        Plan:     wound care to gluteal buttock site as above.  I have reviewed the plan of care with the patient  and she express understanding. I spent over 50% of this 10 minute visit in face to face counseling.

## 2019-09-25 ENCOUNTER — TELEPHONE (OUTPATIENT)
Dept: DERMATOLOGY | Facility: CLINIC | Age: 39
End: 2019-09-25

## 2019-09-25 ENCOUNTER — PATIENT MESSAGE (OUTPATIENT)
Dept: DERMATOLOGY | Facility: CLINIC | Age: 39
End: 2019-09-25

## 2019-09-25 NOTE — TELEPHONE ENCOUNTER
----- Message from Shena Mata sent at 9/25/2019 12:28 PM CDT -----  Contact: PHANI RODRIGUEZ    Type:  Patient Returning Call    Who Called: PHANI RODRIGUEZ     Who Left Message for Patient:Boyd    Does the patient know what this is regarding?results    Best Call Back Number:[phone]    Additional Information:

## 2019-09-25 NOTE — TELEPHONE ENCOUNTER
----- Message from Iris Simon MD sent at 9/25/2019 10:18 AM CDT -----  Please call patient with path results: moderately atypical mole. Needs re-shave vs re-excision.  KK    FINAL PATHOLOGIC DIAGNOSIS  1. Skin, left buttock, shave biopsy:  - MELANOCYTIC NEVUS, COMPOUND TYPE WITH ARCHITECTURAL DISORDER, LENTIGINOUS FEATURES, AND MODERATE CYTOLOGIC ATYPIA (SUSANA'S NEVUS).  - THE LESION EXTENDS TO A LATERAL BIOPSY MARGIN.

## 2019-09-26 ENCOUNTER — PATIENT MESSAGE (OUTPATIENT)
Dept: WOUND CARE | Facility: CLINIC | Age: 39
End: 2019-09-26

## 2019-09-26 ENCOUNTER — PATIENT MESSAGE (OUTPATIENT)
Dept: DERMATOLOGY | Facility: CLINIC | Age: 39
End: 2019-09-26

## 2019-09-27 DIAGNOSIS — F41.1 GENERALIZED ANXIETY DISORDER: ICD-10-CM

## 2019-09-27 RX ORDER — CLONAZEPAM 1 MG/1
TABLET ORAL
Qty: 60 TABLET | Refills: 0 | Status: SHIPPED | OUTPATIENT
Start: 2019-09-27 | End: 2019-10-25 | Stop reason: SDUPTHER

## 2019-10-01 ENCOUNTER — PATIENT MESSAGE (OUTPATIENT)
Dept: GASTROENTEROLOGY | Facility: CLINIC | Age: 39
End: 2019-10-01

## 2019-10-01 ENCOUNTER — PATIENT MESSAGE (OUTPATIENT)
Dept: INTERNAL MEDICINE | Facility: CLINIC | Age: 39
End: 2019-10-01

## 2019-10-02 ENCOUNTER — HOSPITAL ENCOUNTER (OUTPATIENT)
Dept: RADIOLOGY | Facility: OTHER | Age: 39
Discharge: HOME | End: 2019-10-02
Attending: INTERNAL MEDICINE
Payer: MEDICAID

## 2019-10-02 ENCOUNTER — PATIENT MESSAGE (OUTPATIENT)
Dept: ENDOCRINOLOGY | Facility: CLINIC | Age: 39
End: 2019-10-02

## 2019-10-02 ENCOUNTER — IMMUNIZATION (OUTPATIENT)
Dept: INTERNAL MEDICINE | Facility: CLINIC | Age: 39
End: 2019-10-02
Payer: MEDICAID

## 2019-10-02 ENCOUNTER — INFUSION (OUTPATIENT)
Dept: INFUSION THERAPY | Facility: OTHER | Age: 39
End: 2019-10-02
Attending: INTERNAL MEDICINE
Payer: MEDICAID

## 2019-10-02 VITALS
HEART RATE: 79 BPM | DIASTOLIC BLOOD PRESSURE: 83 MMHG | SYSTOLIC BLOOD PRESSURE: 128 MMHG | OXYGEN SATURATION: 99 % | TEMPERATURE: 98 F | RESPIRATION RATE: 18 BRPM

## 2019-10-02 DIAGNOSIS — E04.2 MULTIPLE THYROID NODULES: ICD-10-CM

## 2019-10-02 DIAGNOSIS — K50.019 CROHN'S DISEASE OF SMALL INTESTINE WITH COMPLICATION: Primary | ICD-10-CM

## 2019-10-02 PROCEDURE — 63600175 PHARM REV CODE 636 W HCPCS: Performed by: INTERNAL MEDICINE

## 2019-10-02 PROCEDURE — 96361 HYDRATE IV INFUSION ADD-ON: CPT

## 2019-10-02 PROCEDURE — 76536 US SOFT TISSUE HEAD NECK THYROID: ICD-10-PCS | Mod: 26,,, | Performed by: RADIOLOGY

## 2019-10-02 PROCEDURE — 96360 HYDRATION IV INFUSION INIT: CPT

## 2019-10-02 PROCEDURE — 76536 US EXAM OF HEAD AND NECK: CPT | Mod: 26,,, | Performed by: RADIOLOGY

## 2019-10-02 PROCEDURE — 90686 IIV4 VACC NO PRSV 0.5 ML IM: CPT | Mod: PBBFAC

## 2019-10-02 PROCEDURE — 76536 US EXAM OF HEAD AND NECK: CPT | Mod: TC

## 2019-10-02 RX ORDER — HEPARIN 100 UNIT/ML
500 SYRINGE INTRAVENOUS
Status: COMPLETED | OUTPATIENT
Start: 2019-10-02 | End: 2019-10-02

## 2019-10-02 RX ADMIN — SODIUM CHLORIDE: 0.9 INJECTION, SOLUTION INTRAVENOUS at 08:10

## 2019-10-02 RX ADMIN — HEPARIN 500 UNITS: 100 SYRINGE at 10:10

## 2019-10-02 RX ADMIN — SODIUM CHLORIDE: 0.9 INJECTION, SOLUTION INTRAVENOUS at 09:10

## 2019-10-03 ENCOUNTER — PATIENT MESSAGE (OUTPATIENT)
Dept: GASTROENTEROLOGY | Facility: CLINIC | Age: 39
End: 2019-10-03

## 2019-10-03 ENCOUNTER — PATIENT MESSAGE (OUTPATIENT)
Dept: WOUND CARE | Facility: CLINIC | Age: 39
End: 2019-10-03

## 2019-10-03 DIAGNOSIS — R19.8 HIGH OUTPUT ILEOSTOMY: Primary | ICD-10-CM

## 2019-10-03 DIAGNOSIS — Z93.2 HIGH OUTPUT ILEOSTOMY: Primary | ICD-10-CM

## 2019-10-04 ENCOUNTER — LAB VISIT (OUTPATIENT)
Dept: LAB | Facility: HOSPITAL | Age: 39
End: 2019-10-04
Payer: MEDICAID

## 2019-10-04 DIAGNOSIS — R19.8 HIGH OUTPUT ILEOSTOMY: ICD-10-CM

## 2019-10-04 DIAGNOSIS — Z93.2 HIGH OUTPUT ILEOSTOMY: ICD-10-CM

## 2019-10-04 LAB
C DIFF GDH STL QL: NEGATIVE
C DIFF TOX A+B STL QL IA: NEGATIVE

## 2019-10-04 PROCEDURE — 87449 NOS EACH ORGANISM AG IA: CPT

## 2019-10-07 ENCOUNTER — PATIENT MESSAGE (OUTPATIENT)
Dept: OBSTETRICS AND GYNECOLOGY | Facility: CLINIC | Age: 39
End: 2019-10-07

## 2019-10-07 ENCOUNTER — TELEPHONE (OUTPATIENT)
Dept: OBSTETRICS AND GYNECOLOGY | Facility: CLINIC | Age: 39
End: 2019-10-07

## 2019-10-07 DIAGNOSIS — R10.2 PELVIC PAIN IN FEMALE: Primary | ICD-10-CM

## 2019-10-07 DIAGNOSIS — K50.019 CROHN'S DISEASE OF SMALL INTESTINE WITH COMPLICATION: Primary | Chronic | ICD-10-CM

## 2019-10-07 NOTE — TELEPHONE ENCOUNTER
----- Message from Isaac Sher sent at 10/7/2019  4:44 PM CDT -----  Contact: [phone]/ Self  Patient returning call. Please call.

## 2019-10-07 NOTE — TELEPHONE ENCOUNTER
Patient notified her orders are in and gave her the Central scheduling phone number. Also let patient know she is due for annual visit and to call to make that appointment when she is ready. Patient verbalized understanding.

## 2019-10-08 ENCOUNTER — HOSPITAL ENCOUNTER (OUTPATIENT)
Dept: RADIOLOGY | Facility: OTHER | Age: 39
Discharge: HOME | End: 2019-10-08
Attending: OBSTETRICS & GYNECOLOGY
Payer: MEDICAID

## 2019-10-08 ENCOUNTER — PATIENT MESSAGE (OUTPATIENT)
Dept: INTERNAL MEDICINE | Facility: CLINIC | Age: 39
End: 2019-10-08

## 2019-10-08 ENCOUNTER — PATIENT MESSAGE (OUTPATIENT)
Dept: OBSTETRICS AND GYNECOLOGY | Facility: CLINIC | Age: 39
End: 2019-10-08

## 2019-10-08 ENCOUNTER — TELEPHONE (OUTPATIENT)
Dept: GASTROENTEROLOGY | Facility: CLINIC | Age: 39
End: 2019-10-08

## 2019-10-08 DIAGNOSIS — R10.2 PELVIC PAIN IN FEMALE: ICD-10-CM

## 2019-10-08 DIAGNOSIS — E89.40 SURGICAL MENOPAUSE ON HORMONE REPLACEMENT THERAPY: Primary | ICD-10-CM

## 2019-10-08 DIAGNOSIS — Z79.890 SURGICAL MENOPAUSE ON HORMONE REPLACEMENT THERAPY: Primary | ICD-10-CM

## 2019-10-08 PROCEDURE — 76830 US PELVIS COMP WITH TRANSVAG NON-OB (XPD): ICD-10-PCS | Mod: 26,,, | Performed by: RADIOLOGY

## 2019-10-08 PROCEDURE — 76830 TRANSVAGINAL US NON-OB: CPT | Mod: 26,,, | Performed by: RADIOLOGY

## 2019-10-08 PROCEDURE — 76830 TRANSVAGINAL US NON-OB: CPT | Mod: TC

## 2019-10-08 PROCEDURE — 76856 US EXAM PELVIC COMPLETE: CPT | Mod: 26,,, | Performed by: RADIOLOGY

## 2019-10-08 PROCEDURE — 76856 US PELVIS COMP WITH TRANSVAG NON-OB (XPD): ICD-10-PCS | Mod: 26,,, | Performed by: RADIOLOGY

## 2019-10-08 NOTE — TELEPHONE ENCOUNTER
Stool specimen container left at  [facility]th [facility] for patient to  on 10/9. Prior to placing it at the  the container was labeled and checked against the order by myself and Blanca.                               Patient aware how to collect and submit.

## 2019-10-09 ENCOUNTER — LAB VISIT (OUTPATIENT)
Dept: LAB | Facility: HOSPITAL | Age: 39
End: 2019-10-09
Attending: INTERNAL MEDICINE
Payer: MEDICAID

## 2019-10-09 ENCOUNTER — PATIENT MESSAGE (OUTPATIENT)
Dept: GYNECOLOGIC ONCOLOGY | Facility: CLINIC | Age: 39
End: 2019-10-09

## 2019-10-09 DIAGNOSIS — K50.019 CROHN'S DISEASE OF SMALL INTESTINE WITH COMPLICATION: Chronic | ICD-10-CM

## 2019-10-09 LAB
C DIFF GDH STL QL: NEGATIVE
C DIFF TOX A+B STL QL IA: NEGATIVE

## 2019-10-09 PROCEDURE — 87449 NOS EACH ORGANISM AG IA: CPT

## 2019-10-09 PROCEDURE — 87427 SHIGA-LIKE TOXIN AG IA: CPT

## 2019-10-09 PROCEDURE — 87046 STOOL CULTR AEROBIC BACT EA: CPT | Mod: 59

## 2019-10-09 PROCEDURE — 87507 IADNA-DNA/RNA PROBE TQ 12-25: CPT

## 2019-10-09 PROCEDURE — 87045 FECES CULTURE AEROBIC BACT: CPT

## 2019-10-10 LAB
E COLI SXT1 STL QL IA: NEGATIVE
E COLI SXT2 STL QL IA: NEGATIVE

## 2019-10-12 LAB — BACTERIA STL CULT: NORMAL

## 2019-10-14 ENCOUNTER — LAB VISIT (OUTPATIENT)
Dept: LAB | Facility: HOSPITAL | Age: 39
End: 2019-10-14
Attending: OBSTETRICS & GYNECOLOGY
Payer: MEDICAID

## 2019-10-14 ENCOUNTER — PATIENT MESSAGE (OUTPATIENT)
Dept: OBSTETRICS AND GYNECOLOGY | Facility: CLINIC | Age: 39
End: 2019-10-14

## 2019-10-14 DIAGNOSIS — E89.40 SURGICAL MENOPAUSE ON HORMONE REPLACEMENT THERAPY: ICD-10-CM

## 2019-10-14 DIAGNOSIS — Z79.890 SURGICAL MENOPAUSE ON HORMONE REPLACEMENT THERAPY: ICD-10-CM

## 2019-10-14 LAB
ESTRADIOL SERPL-MCNC: 182 PG/ML
GPP - ADENOVIRUS 40/41: NOT DETECTED
GPP - CAMPYLOBACTER: NOT DETECTED
GPP - CLOSTRIDIUM DIFFICILE TOXIN A/B: NOT DETECTED
GPP - CRYPTOSPORIDIUM: NOT DETECTED
GPP - E COLI O157: NOT DETECTED
GPP - ENTAMOEBA HISTOLYTICA: NOT DETECTED
GPP - ENTEROTOXIGENIC E COLI (ETEC): NOT DETECTED
GPP - GIARDIA LAMBLIA: NOT DETECTED
GPP - NOROVIRUS GI/GII: NOT DETECTED
GPP - ROTAVIRUS A: NOT DETECTED
GPP - SALMONELLA: NOT DETECTED
GPP - SHIGELLA: NOT DETECTED
GPP - VIBRIO CHOLERA: NOT DETECTED
GPP - YERSINIA ENTEROCOLITICA: NOT DETECTED
LACTATE PLASV-SCNC: NOT DETECTED MMOL/L

## 2019-10-14 PROCEDURE — 82670 ASSAY OF TOTAL ESTRADIOL: CPT

## 2019-10-14 PROCEDURE — 36415 COLL VENOUS BLD VENIPUNCTURE: CPT

## 2019-10-15 RX ORDER — MIRTAZAPINE 30 MG/1
TABLET, ORALLY DISINTEGRATING ORAL
Qty: 90 TABLET | Refills: 0 | OUTPATIENT
Start: 2019-10-15

## 2019-10-16 DIAGNOSIS — K50.819 CROHN'S DISEASE OF BOTH SMALL AND LARGE INTESTINE WITH COMPLICATION: ICD-10-CM

## 2019-10-16 DIAGNOSIS — Z79.899 ENCOUNTER FOR LONG-TERM (CURRENT) USE OF HIGH-RISK MEDICATION: ICD-10-CM

## 2019-10-17 RX ORDER — FLUCONAZOLE 100 MG/1
100 TABLET ORAL
Qty: 3 TABLET | Refills: 0 | Status: SHIPPED | OUTPATIENT
Start: 2019-10-17 | End: 2019-10-24

## 2019-10-17 RX ORDER — DRONABINOL 5 MG/1
CAPSULE ORAL
Qty: 60 CAPSULE | Refills: 0 | Status: SHIPPED | OUTPATIENT
Start: 2019-10-17 | End: 2019-11-14 | Stop reason: SDUPTHER

## 2019-10-17 RX ORDER — TERCONAZOLE 4 MG/G
CREAM VAGINAL
Qty: 45 G | Refills: 0 | OUTPATIENT
Start: 2019-10-17

## 2019-10-17 RX ORDER — PROMETHAZINE HYDROCHLORIDE 25 MG/1
TABLET ORAL
Qty: 30 TABLET | Refills: 0 | Status: SHIPPED | OUTPATIENT
Start: 2019-10-17 | End: 2019-12-11 | Stop reason: SDUPTHER

## 2019-10-17 NOTE — TELEPHONE ENCOUNTER
Received request for Terazol but has upcoming annual and if uses Terazol will obscure Pap results. Will send Diflucan instead

## 2019-10-18 RX ORDER — CLINDAMYCIN PHOSPHATE 20 MG/G
CREAM VAGINAL
Qty: 40 G | Refills: 0 | OUTPATIENT
Start: 2019-10-18

## 2019-10-21 ENCOUNTER — OFFICE VISIT (OUTPATIENT)
Dept: UROLOGY | Facility: CLINIC | Age: 39
End: 2019-10-21
Attending: UROLOGY
Payer: MEDICAID

## 2019-10-21 ENCOUNTER — INFUSION (OUTPATIENT)
Dept: INFUSION THERAPY | Facility: OTHER | Age: 39
End: 2019-10-21
Attending: INTERNAL MEDICINE
Payer: MEDICAID

## 2019-10-21 VITALS
DIASTOLIC BLOOD PRESSURE: 78 MMHG | HEART RATE: 66 BPM | DIASTOLIC BLOOD PRESSURE: 78 MMHG | SYSTOLIC BLOOD PRESSURE: 117 MMHG | TEMPERATURE: 99 F | HEART RATE: 73 BPM | HEIGHT: 61 IN | RESPIRATION RATE: 18 BRPM | BODY MASS INDEX: 21.9 KG/M2 | WEIGHT: 116 LBS | OXYGEN SATURATION: 99 % | SYSTOLIC BLOOD PRESSURE: 123 MMHG

## 2019-10-21 DIAGNOSIS — N39.0 RECURRENT UTI: Primary | ICD-10-CM

## 2019-10-21 DIAGNOSIS — F41.1 GENERALIZED ANXIETY DISORDER: Primary | ICD-10-CM

## 2019-10-21 DIAGNOSIS — K50.019 CROHN'S DISEASE OF SMALL INTESTINE WITH COMPLICATION: Primary | ICD-10-CM

## 2019-10-21 LAB
BILIRUB SERPL-MCNC: ABNORMAL MG/DL
BLOOD URINE, POC: ABNORMAL
COLOR, POC UA: ABNORMAL
GLUCOSE UR QL STRIP: NORMAL
KETONES UR QL STRIP: ABNORMAL
LEUKOCYTE ESTERASE URINE, POC: ABNORMAL
NITRITE, POC UA: ABNORMAL
PH, POC UA: 5
PROTEIN, POC: ABNORMAL
SPECIFIC GRAVITY, POC UA: 1.02
UROBILINOGEN, POC UA: NORMAL

## 2019-10-21 PROCEDURE — 63600175 PHARM REV CODE 636 W HCPCS: Performed by: INTERNAL MEDICINE

## 2019-10-21 PROCEDURE — 99213 PR OFFICE/OUTPT VISIT, EST, LEVL III, 20-29 MIN: ICD-10-PCS | Mod: 25,S$GLB,, | Performed by: UROLOGY

## 2019-10-21 PROCEDURE — 99213 OFFICE O/P EST LOW 20 MIN: CPT | Mod: 25,S$GLB,, | Performed by: UROLOGY

## 2019-10-21 PROCEDURE — 81002 URINALYSIS NONAUTO W/O SCOPE: CPT | Mod: S$GLB,,, | Performed by: UROLOGY

## 2019-10-21 PROCEDURE — 96361 HYDRATE IV INFUSION ADD-ON: CPT

## 2019-10-21 PROCEDURE — 96360 HYDRATION IV INFUSION INIT: CPT

## 2019-10-21 PROCEDURE — 81002 POCT URINE DIPSTICK WITHOUT MICROSCOPE: ICD-10-PCS | Mod: S$GLB,,, | Performed by: UROLOGY

## 2019-10-21 RX ORDER — HEPARIN 100 UNIT/ML
500 SYRINGE INTRAVENOUS
Status: COMPLETED | OUTPATIENT
Start: 2019-10-21 | End: 2019-10-21

## 2019-10-21 RX ORDER — CEPHALEXIN 250 MG/1
CAPSULE ORAL
Qty: 30 CAPSULE | Refills: 11 | Status: SHIPPED | OUTPATIENT
Start: 2019-10-21 | End: 2020-09-22 | Stop reason: ALTCHOICE

## 2019-10-21 RX ORDER — HEPARIN 100 UNIT/ML
500 SYRINGE INTRAVENOUS
Status: CANCELLED | OUTPATIENT
Start: 2019-11-28

## 2019-10-21 RX ORDER — HEPARIN 100 UNIT/ML
500 SYRINGE INTRAVENOUS
Status: CANCELLED | OUTPATIENT
Start: 2019-11-04

## 2019-10-21 RX ORDER — MIRTAZAPINE 30 MG/1
30 TABLET, ORALLY DISINTEGRATING ORAL NIGHTLY
Qty: 90 TABLET | Refills: 3 | Status: SHIPPED | OUTPATIENT
Start: 2019-10-21 | End: 2020-11-05 | Stop reason: SDUPTHER

## 2019-10-21 RX ORDER — HEPARIN 100 UNIT/ML
500 SYRINGE INTRAVENOUS
OUTPATIENT
Start: 2020-01-29

## 2019-10-21 RX ADMIN — HEPARIN 500 UNITS: 100 SYRINGE at 02:10

## 2019-10-21 RX ADMIN — SODIUM CHLORIDE: 0.9 INJECTION, SOLUTION INTRAVENOUS at 01:10

## 2019-10-21 RX ADMIN — SODIUM CHLORIDE: 0.9 INJECTION, SOLUTION INTRAVENOUS at 12:10

## 2019-10-21 NOTE — PROGRESS NOTES
"Subjective:      Ivy Salgado is a 39 y.o. female who returns today regarding her UTIs.    Full history detailed in prior notes.    She again reports no UTIs in the past 6 months; she has had none since she started Keflex prophylaxis in January.  Prior to this she had had 3 UTIs within 2-3 months; these started after starting a new biologic medication for her Crohn's disease, which is currently on hold but plan to resume soon. Prior to that she had gone several months without UTI.     The following portions of the patient's history were reviewed and updated as appropriate: allergies, current medications, past family history, past medical history, past social history, past surgical history and problem list.    Review of Systems  A comprehensive multipoint review of systems was negative except as otherwise stated in the HPI.     Objective:   Vitals: /78 (BP Location: Right arm, Patient Position: Sitting)   Pulse 66   Ht 5' 1" (1.549 m)   Wt 52.6 kg (116 lb)   LMP 03/30/2015   BMI 21.92 kg/m²     Physical Exam   General: alert and oriented, no acute distress  Respiratory: Symmetric expansion, non-labored breathing  Neuro: no gross deficits  Psych: normal judgment and insight, normal mood/affect and non-anxious    Lab Review     Lab Results   Component Value Date    WBC 6.89 07/22/2019    HGB 11.8 (L) 07/22/2019    HCT 36.2 (L) 07/22/2019    MCV 87 07/22/2019     (H) 07/22/2019     Lab Results   Component Value Date    CREATININE 0.8 07/22/2019    BUN 8 07/22/2019     Date: 11/2/17 -- Vol 0.50 L, Ca 95 mg/day, Ox 11 mg/day, Cit 132 mg/day, pH 5.319, Na 21, K 20, Mg 18, Cr 796     Imaging       Assessment and Plan:   1. History of recurrent UTIs  -- Continue prophylactic abx (Keflex 250 daily) while continuing Crohn's treatment    2. Nephrolithiasis  -- Minimal stone burden on recent CT  -- Unlikely contributing to UTIs  -- Continue UroCit K 30 mEq BID    3. Low urine volume   4. Hypocitraturia  5. " Low urine pH  -- Previously addressed    FU 6 mos

## 2019-10-25 ENCOUNTER — OFFICE VISIT (OUTPATIENT)
Dept: OBSTETRICS AND GYNECOLOGY | Facility: CLINIC | Age: 39
End: 2019-10-25
Payer: MEDICAID

## 2019-10-25 VITALS
WEIGHT: 113.88 LBS | SYSTOLIC BLOOD PRESSURE: 100 MMHG | DIASTOLIC BLOOD PRESSURE: 64 MMHG | HEIGHT: 61 IN | BODY MASS INDEX: 21.5 KG/M2

## 2019-10-25 DIAGNOSIS — F41.1 GENERALIZED ANXIETY DISORDER: ICD-10-CM

## 2019-10-25 DIAGNOSIS — Z01.419 WELL WOMAN EXAM WITH ROUTINE GYNECOLOGICAL EXAM: Primary | ICD-10-CM

## 2019-10-25 DIAGNOSIS — Z12.72 SCREENING FOR VAGINAL CANCER: ICD-10-CM

## 2019-10-25 PROCEDURE — 99213 OFFICE O/P EST LOW 20 MIN: CPT | Mod: PBBFAC,PO | Performed by: OBSTETRICS & GYNECOLOGY

## 2019-10-25 PROCEDURE — 99395 PREV VISIT EST AGE 18-39: CPT | Mod: S$PBB,,, | Performed by: OBSTETRICS & GYNECOLOGY

## 2019-10-25 PROCEDURE — 99999 PR PBB SHADOW E&M-EST. PATIENT-LVL III: CPT | Mod: PBBFAC,,, | Performed by: OBSTETRICS & GYNECOLOGY

## 2019-10-25 PROCEDURE — 88175 CYTOPATH C/V AUTO FLUID REDO: CPT

## 2019-10-25 PROCEDURE — 99395 PR PREVENTIVE VISIT,EST,18-39: ICD-10-PCS | Mod: S$PBB,,, | Performed by: OBSTETRICS & GYNECOLOGY

## 2019-10-25 PROCEDURE — 99999 PR PBB SHADOW E&M-EST. PATIENT-LVL III: ICD-10-PCS | Mod: PBBFAC,,, | Performed by: OBSTETRICS & GYNECOLOGY

## 2019-10-25 PROCEDURE — 87624 HPV HI-RISK TYP POOLED RSLT: CPT

## 2019-10-25 RX ORDER — CLONAZEPAM 1 MG/1
TABLET ORAL
Qty: 60 TABLET | Refills: 0 | Status: SHIPPED | OUTPATIENT
Start: 2019-10-25 | End: 2019-11-22 | Stop reason: SDUPTHER

## 2019-10-25 RX ORDER — CLONAZEPAM 1 MG/1
1 TABLET ORAL 2 TIMES DAILY
Qty: 60 TABLET | Refills: 0 | OUTPATIENT
Start: 2019-10-25

## 2019-10-25 NOTE — PROGRESS NOTES
"OBSTETRIC HISTORY:   OB History        2    Para   1    Term   1       0    AB   1    Living   1       SAB   1    TAB   0    Ectopic   0    Multiple   0    Live Births   1                COMPREHENSIVE GYN HISTORY:  PAP History: Reports abnormal Paps. Date: 10/2/17 Result: LGSIL of vagina (hx of CKC)  Pap  Dates: , , ,  has had CKC  Infection History: Reports STDs: Herpes. Denies PID.  Benign History: Denies uterine fibroids. Reports ovarian cysts. Denies endometriosis.   Cancer History: Denies cervical cancer. Denies uterine cancer or hyperplasia. Denies ovarian cancer. Denies vulvar cancer or pre-cancer. Denies vaginal cancer or pre-cancer. Denies breast cancer. Denies colon cancer.  Sexual Activity History:  reports that she currently engages in sexual activity. She reports using the following methods of birth control/protection: Pill and Surgical.   Menstrual History: Every 28 days, flows for 3 days. Moderate flow.  Dysmenorrhea History: Reports severe dysmenorrhea.  Contraception: Hysterectomy        HPI:   39 y.o.  Patient's last menstrual period was 2015.    Patient is  here for her annual gynecologic exam.  She has complaints of RLQ pain (has right ovarian cyst. She denies bladder, bowel and breast complaints.    ROS:  GENERAL: Denies weight gain or weight loss. Feeling well overall.   SKIN: Denies rash or lesions.   HEAD: Denies headache.   NODES: Denies enlarged lymph nodes.   CHEST: Denies shortness of breath.   ABDOMEN: No constipation, diarrhea, nausea, vomiting or rectal bleeding.   URINARY: No frequency, dysuria, hematuria, or burning on urination.  REPRODUCTIVE: See HPI.   BREASTS: The patient denies pain, lumps, or nipple discharge.   HEMATOLOGIC: No easy bruisability.   MUSCULOSKELETAL: Denies joint pain or back pain.   NEUROLOGIC: Denies weakness.   PSYCHIATRIC: Denies depression, anxiety or mood swings.      PE:   /64   Ht 5' 1" (1.549 m)   Wt 51.6 kg " (113 lb 13.9 oz)   LMP 03/30/2015   BMI 21.52 kg/m²   APPEARANCE: Well nourished, well developed, in no acute distress.  NECK: Neck symmetric without thyromegaly.  NODES: No inguinal or cervical lymph node enlargement.  CHEST: Lungs clear to auscultation.  HEART: Regular rate and rhythm, no murmurs, rubs or gallops.  ABDOMEN: Soft. No tenderness or masses. No hernias. Ostomy  BREASTS: Symmetrical, no skin changes or visible lesions. No palpable masses, nipple discharge or adenopathy bilaterally.  VULVA: No lesions. Normal female genitalia.  URETHRAL MEATUS: Normal size and location, no lesions, no prolapse.  URETHRA: No masses, tenderness, prolapse or scarring.  VAGINA: Moist and well rugated, no discharge, no significant cystocele or rectocele.  CERVIX AND UTERUS SURGICALLY ABSENT.   ADNEXA: No masses or tenderness.    PROCEDURES:  Pap smear  HRHPV    Assessment:  Normal Gynecologic Exam  History of abnormal pap  Right ovarian cyst    Plan:  Mammogram and Colonoscopy if indicated by current recommendations.  Return to clinic in one year or for any problems or complaints.    Counseling:  Patient was counseled today on A.C.S. Pap guidelines and recommendations for yearly pelvic exams and monthly self breast exams; to see her PCP for other health maintenance.Regular exercise and healthy diet.

## 2019-10-29 ENCOUNTER — PATIENT MESSAGE (OUTPATIENT)
Dept: UROLOGY | Facility: CLINIC | Age: 39
End: 2019-10-29

## 2019-10-30 ENCOUNTER — PATIENT MESSAGE (OUTPATIENT)
Dept: UROLOGY | Facility: CLINIC | Age: 39
End: 2019-10-30

## 2019-11-08 ENCOUNTER — PROCEDURE VISIT (OUTPATIENT)
Dept: DERMATOLOGY | Facility: CLINIC | Age: 39
End: 2019-11-08
Payer: MEDICAID

## 2019-11-08 DIAGNOSIS — D48.5 NEOPLASM OF UNCERTAIN BEHAVIOR OF SKIN: Primary | ICD-10-CM

## 2019-11-08 PROCEDURE — 88305 TISSUE EXAM BY PATHOLOGIST: CPT | Performed by: PATHOLOGY

## 2019-11-08 PROCEDURE — 12032 PR LAYR CLOS WND TRUNK,ARM,LEG 2.6-7.5 CM: ICD-10-PCS | Mod: S$GLB,,, | Performed by: DERMATOLOGY

## 2019-11-08 PROCEDURE — 88305 TISSUE SPECIMEN TO PATHOLOGY, DERMATOLOGY: ICD-10-PCS | Mod: 26,,, | Performed by: PATHOLOGY

## 2019-11-08 PROCEDURE — 11402 PR EXC SKIN BENIG 1.1-2 CM TRUNK,ARM,LEG: ICD-10-PCS | Mod: 51,S$GLB,, | Performed by: DERMATOLOGY

## 2019-11-08 PROCEDURE — 11402 EXC TR-EXT B9+MARG 1.1-2 CM: CPT | Mod: 51,S$GLB,, | Performed by: DERMATOLOGY

## 2019-11-08 PROCEDURE — 99499 UNLISTED E&M SERVICE: CPT | Mod: S$GLB,,, | Performed by: DERMATOLOGY

## 2019-11-08 PROCEDURE — 99499 NO LOS: ICD-10-PCS | Mod: S$GLB,,, | Performed by: DERMATOLOGY

## 2019-11-08 PROCEDURE — 12032 INTMD RPR S/A/T/EXT 2.6-7.5: CPT | Mod: S$GLB,,, | Performed by: DERMATOLOGY

## 2019-11-08 NOTE — PROGRESS NOTES
PROCEDURE NOTE    DIAGNOSIS: MELANOCYTIC NEVUS, COMPOUND TYPE WITH ARCHITECTURAL DISORDER, LENTIGINOUS FEATURES, AND MODERATE CYTOLOGIC ATYPIA (SUSANA'S NEVUS).    LOCATION: left buttock    ASSISTANT: Ysabel Chopra LPN    ANESTHESIA:  1% Lidocaine with Epinephrine and Sodium bicarbonate, 9 mL    PROCEDURE: Risks, benefits, and alternatives are discussed with the patient, and the patient agrees to the procedure by signing an informed consent. Patient is taken to the surgery suite and assumes a comfortable position. The area to be anesthetized is cleaned with Hibiclens solution. The area is injected with local anesthesia to produce a suitable field for surgery. Cold steel scalpel excision is performed through the skin to subcutaneous tissue in a fusiform pattern so as to encompass the lesion with a 3 mm margin. The specimen of tissue is removed and sent to the pathology lab. Bleeding points are stopped with monopolar hyfrecation or tied with absorbable suture as needed throughout the procedure to maintain hemostasis.     CLOSURE: The wound is undermined in all directions to mobilize tissue and to reduce tension on the sutured wound edges.  4-0 PDS II absorbable suture is used in a layered fashion closure through dermis and subcutaneous tissue to close the wound and to genny the cutaneous wound edge. This allows for preservation of structure and function of surrounding anatomy. The epidermis and dermis are sutured with 4-0 Ethilon in an interrupted fashion.     SIZE OF EXCISION: 1.7 x 1.5 cm    LENGTH OF REPAIR: 5.1 cm     The area was then cleaned, and a sterile pressure dressing applied. Procedure tolerated well without adverse event and negligible blood loss. The patient is discharged in stable condition.     Post op instructions: the patient is verbally (and in writing) educated on care of wound and told to call or return for bleeding, tenderness, redness, etc. Patient should use acetaminophen 1000 mg every 8  hours for pain relief if needed.     Suture removal in 14 day(s).

## 2019-11-13 ENCOUNTER — PATIENT MESSAGE (OUTPATIENT)
Dept: GASTROENTEROLOGY | Facility: CLINIC | Age: 39
End: 2019-11-13

## 2019-11-13 ENCOUNTER — DOCUMENTATION ONLY (OUTPATIENT)
Dept: GASTROENTEROLOGY | Facility: CLINIC | Age: 39
End: 2019-11-13

## 2019-11-13 RX ORDER — GABAPENTIN 300 MG/1
300 CAPSULE ORAL DAILY
Qty: 60 CAPSULE | Refills: 11 | Status: SHIPPED | OUTPATIENT
Start: 2019-11-13 | End: 2020-11-03

## 2019-11-13 NOTE — PROGRESS NOTES
I will start trying to help pain with gabapentin    Ideally, I would like help from pain management, but they will not see the pt because of the medicaid insurance

## 2019-11-14 DIAGNOSIS — K50.819 CROHN'S DISEASE OF BOTH SMALL AND LARGE INTESTINE WITH COMPLICATION: ICD-10-CM

## 2019-11-14 DIAGNOSIS — F41.9 ANXIETY: ICD-10-CM

## 2019-11-14 DIAGNOSIS — Z79.899 ENCOUNTER FOR LONG-TERM (CURRENT) USE OF HIGH-RISK MEDICATION: ICD-10-CM

## 2019-11-14 DIAGNOSIS — K50.819 CROHN'S DISEASE OF SMALL AND LARGE INTESTINES WITH COMPLICATION: ICD-10-CM

## 2019-11-15 RX ORDER — ZOLPIDEM TARTRATE 10 MG/1
TABLET ORAL
Qty: 30 TABLET | Refills: 0 | Status: SHIPPED | OUTPATIENT
Start: 2019-11-15 | End: 2019-12-13 | Stop reason: SDUPTHER

## 2019-11-19 ENCOUNTER — PATIENT MESSAGE (OUTPATIENT)
Dept: UROLOGY | Facility: CLINIC | Age: 39
End: 2019-11-19

## 2019-11-19 DIAGNOSIS — R10.9 FLANK PAIN: Primary | ICD-10-CM

## 2019-11-19 RX ORDER — DRONABINOL 5 MG/1
CAPSULE ORAL
Qty: 60 CAPSULE | Refills: 0 | Status: SHIPPED | OUTPATIENT
Start: 2019-11-19 | End: 2019-12-19

## 2019-11-19 RX ORDER — DIPHENOXYLATE HYDROCHLORIDE AND ATROPINE SULFATE 2.5; .025 MG/1; MG/1
TABLET ORAL
Qty: 100 TABLET | Refills: 0 | Status: SHIPPED | OUTPATIENT
Start: 2019-11-19 | End: 2019-12-19

## 2019-11-20 ENCOUNTER — LAB VISIT (OUTPATIENT)
Dept: LAB | Facility: HOSPITAL | Age: 39
End: 2019-11-20
Payer: MEDICAID

## 2019-11-20 DIAGNOSIS — R10.9 FLANK PAIN: ICD-10-CM

## 2019-11-20 PROCEDURE — 87086 URINE CULTURE/COLONY COUNT: CPT

## 2019-11-21 ENCOUNTER — CLINICAL SUPPORT (OUTPATIENT)
Dept: DERMATOLOGY | Facility: CLINIC | Age: 39
End: 2019-11-21
Payer: MEDICAID

## 2019-11-21 ENCOUNTER — PATIENT MESSAGE (OUTPATIENT)
Dept: UROLOGY | Facility: CLINIC | Age: 39
End: 2019-11-21

## 2019-11-21 DIAGNOSIS — Z48.02 VISIT FOR SUTURE REMOVAL: Primary | ICD-10-CM

## 2019-11-21 LAB — BACTERIA UR CULT: NORMAL

## 2019-11-21 PROCEDURE — 99024 POSTOP FOLLOW-UP VISIT: CPT | Mod: S$GLB,,, | Performed by: DERMATOLOGY

## 2019-11-21 PROCEDURE — 99024 PR POST-OP FOLLOW-UP VISIT: ICD-10-PCS | Mod: S$GLB,,, | Performed by: DERMATOLOGY

## 2019-11-21 NOTE — PROGRESS NOTES
Suture Removal note:  CC: 39 y.o. female patient is here for suture removal.         HPI: Patient is s/p excision of moderately atypical nevus from the left buttock on 11/8/2019.  Patient reports no problems.    WOUND PE:  Sutures intact.  Wound healing well.  Good approximation of skin edges.  No signs or symptoms of infection.    IMPRESSION:Sections show a horizontally oriented proliferation of fibroblasts with  perpendicularly oriented dilated blood vessels in the absence of residual melanocytic neoplasm  - margins clear.    PLAN:  Sutures removed today.  Continue wound care.    RTC: In PRN months.

## 2019-11-21 NOTE — PROGRESS NOTES
Subjective:       Patient ID:  Ivy Salgado is a 39 y.o. female who presents for   Chief Complaint   Patient presents with    Suture / Staple Removal     left buttock     HPI    Review of Systems     Objective:    Physical Exam       Diagram Legend     Erythematous scaling macule/papule c/w actinic keratosis       Vascular papule c/w angioma      Pigmented verrucoid papule/plaque c/w seborrheic keratosis      Yellow umbilicated papule c/w sebaceous hyperplasia      Irregularly shaped tan macule c/w lentigo     1-2 mm smooth white papules consistent with Milia      Movable subcutaneous cyst with punctum c/w epidermal inclusion cyst      Subcutaneous movable cyst c/w pilar cyst      Firm pink to brown papule c/w dermatofibroma      Pedunculated fleshy papule(s) c/w skin tag(s)      Evenly pigmented macule c/w junctional nevus     Mildly variegated pigmented, slightly irregular-bordered macule c/w mildly atypical nevus      Flesh colored to evenly pigmented papule c/w intradermal nevus       Pink pearly papule/plaque c/w basal cell carcinoma      Erythematous hyperkeratotic cursted plaque c/w SCC      Surgical scar with no sign of skin cancer recurrence      Open and closed comedones      Inflammatory papules and pustules      Verrucoid papule consistent consistent with wart     Erythematous eczematous patches and plaques     Dystrophic onycholytic nail with subungual debris c/w onychomycosis     Umbilicated papule    Erythematous-base heme-crusted tan verrucoid plaque consistent with inflamed seborrheic keratosis     Erythematous Silvery Scaling Plaque c/w Psoriasis     See annotation      Assessment / Plan:        There are no diagnoses linked to this encounter.         No follow-ups on file.

## 2019-11-22 DIAGNOSIS — F41.1 GENERALIZED ANXIETY DISORDER: ICD-10-CM

## 2019-11-22 RX ORDER — CLONAZEPAM 1 MG/1
TABLET ORAL
Qty: 60 TABLET | Refills: 0 | Status: SHIPPED | OUTPATIENT
Start: 2019-11-22 | End: 2019-12-18 | Stop reason: SDUPTHER

## 2019-11-23 ENCOUNTER — PATIENT MESSAGE (OUTPATIENT)
Dept: INTERNAL MEDICINE | Facility: CLINIC | Age: 39
End: 2019-11-23

## 2019-11-23 DIAGNOSIS — G90.1 DYSAUTONOMIA: Primary | ICD-10-CM

## 2019-11-24 ENCOUNTER — PATIENT MESSAGE (OUTPATIENT)
Dept: OBSTETRICS AND GYNECOLOGY | Facility: CLINIC | Age: 39
End: 2019-11-24

## 2019-11-24 ENCOUNTER — PATIENT MESSAGE (OUTPATIENT)
Dept: WOUND CARE | Facility: CLINIC | Age: 39
End: 2019-11-24

## 2019-11-25 DIAGNOSIS — B37.2 SKIN YEAST INFECTION: Primary | ICD-10-CM

## 2019-11-25 RX ORDER — FLUCONAZOLE 100 MG/1
100 TABLET ORAL DAILY
Qty: 7 TABLET | Refills: 0 | Status: SHIPPED | OUTPATIENT
Start: 2019-11-25 | End: 2019-12-19

## 2019-11-25 RX ORDER — METRONIDAZOLE 500 MG/1
500 TABLET ORAL EVERY 12 HOURS
Qty: 14 TABLET | Refills: 0 | Status: SHIPPED | OUTPATIENT
Start: 2019-11-25 | End: 2019-12-02

## 2019-11-26 ENCOUNTER — PATIENT MESSAGE (OUTPATIENT)
Dept: DERMATOLOGY | Facility: CLINIC | Age: 39
End: 2019-11-26

## 2019-11-27 ENCOUNTER — OFFICE VISIT (OUTPATIENT)
Dept: GASTROENTEROLOGY | Facility: CLINIC | Age: 39
End: 2019-11-27
Payer: MEDICAID

## 2019-11-27 ENCOUNTER — INFUSION (OUTPATIENT)
Dept: INFUSION THERAPY | Facility: OTHER | Age: 39
End: 2019-11-27
Attending: INTERNAL MEDICINE
Payer: MEDICAID

## 2019-11-27 VITALS
HEIGHT: 61 IN | SYSTOLIC BLOOD PRESSURE: 115 MMHG | DIASTOLIC BLOOD PRESSURE: 78 MMHG | HEART RATE: 76 BPM | WEIGHT: 116.88 LBS | BODY MASS INDEX: 22.07 KG/M2

## 2019-11-27 DIAGNOSIS — Z93.2 S/P ILEOSTOMY: Chronic | ICD-10-CM

## 2019-11-27 DIAGNOSIS — K50.019 CROHN'S DISEASE OF SMALL INTESTINE WITH COMPLICATION: Primary | ICD-10-CM

## 2019-11-27 DIAGNOSIS — K50.019 CROHN'S DISEASE OF SMALL INTESTINE WITH COMPLICATION: Primary | Chronic | ICD-10-CM

## 2019-11-27 LAB
ALBUMIN SERPL BCP-MCNC: 3.7 G/DL (ref 3.5–5.2)
ALP SERPL-CCNC: 118 U/L (ref 55–135)
ALT SERPL W/O P-5'-P-CCNC: 20 U/L (ref 10–44)
ANION GAP SERPL CALC-SCNC: 9 MMOL/L (ref 8–16)
AST SERPL-CCNC: 23 U/L (ref 10–40)
BASOPHILS # BLD AUTO: 0.02 K/UL (ref 0–0.2)
BASOPHILS NFR BLD: 0.3 % (ref 0–1.9)
BILIRUB SERPL-MCNC: 0.3 MG/DL (ref 0.1–1)
BUN SERPL-MCNC: 13 MG/DL (ref 6–20)
CALCIUM SERPL-MCNC: 9.2 MG/DL (ref 8.7–10.5)
CHLORIDE SERPL-SCNC: 108 MMOL/L (ref 95–110)
CO2 SERPL-SCNC: 22 MMOL/L (ref 23–29)
CREAT SERPL-MCNC: 0.8 MG/DL (ref 0.5–1.4)
CRP SERPL-MCNC: 3.5 MG/L (ref 0–8.2)
DIFFERENTIAL METHOD: ABNORMAL
EOSINOPHIL # BLD AUTO: 0 K/UL (ref 0–0.5)
EOSINOPHIL NFR BLD: 0.6 % (ref 0–8)
ERYTHROCYTE [DISTWIDTH] IN BLOOD BY AUTOMATED COUNT: 14.6 % (ref 11.5–14.5)
EST. GFR  (AFRICAN AMERICAN): >60 ML/MIN/1.73 M^2
EST. GFR  (NON AFRICAN AMERICAN): >60 ML/MIN/1.73 M^2
GLUCOSE SERPL-MCNC: 95 MG/DL (ref 70–110)
HCT VFR BLD AUTO: 37.4 % (ref 37–48.5)
HGB BLD-MCNC: 12.5 G/DL (ref 12–16)
IMM GRANULOCYTES # BLD AUTO: 0.02 K/UL (ref 0–0.04)
IMM GRANULOCYTES NFR BLD AUTO: 0.3 % (ref 0–0.5)
LYMPHOCYTES # BLD AUTO: 1.8 K/UL (ref 1–4.8)
LYMPHOCYTES NFR BLD: 25.7 % (ref 18–48)
MCH RBC QN AUTO: 28.7 PG (ref 27–31)
MCHC RBC AUTO-ENTMCNC: 33.4 G/DL (ref 32–36)
MCV RBC AUTO: 86 FL (ref 82–98)
MONOCYTES # BLD AUTO: 0.6 K/UL (ref 0.3–1)
MONOCYTES NFR BLD: 8.6 % (ref 4–15)
NEUTROPHILS # BLD AUTO: 4.6 K/UL (ref 1.8–7.7)
NEUTROPHILS NFR BLD: 64.5 % (ref 38–73)
NRBC BLD-RTO: 0 /100 WBC
PLATELET # BLD AUTO: 236 K/UL (ref 150–350)
PMV BLD AUTO: 9.8 FL (ref 9.2–12.9)
POTASSIUM SERPL-SCNC: 3.9 MMOL/L (ref 3.5–5.1)
PROT SERPL-MCNC: 7.4 G/DL (ref 6–8.4)
RBC # BLD AUTO: 4.35 M/UL (ref 4–5.4)
SODIUM SERPL-SCNC: 139 MMOL/L (ref 136–145)
WBC # BLD AUTO: 7.07 K/UL (ref 3.9–12.7)

## 2019-11-27 PROCEDURE — 63600175 PHARM REV CODE 636 W HCPCS: Performed by: INTERNAL MEDICINE

## 2019-11-27 PROCEDURE — 80053 COMPREHEN METABOLIC PANEL: CPT

## 2019-11-27 PROCEDURE — 96361 HYDRATE IV INFUSION ADD-ON: CPT

## 2019-11-27 PROCEDURE — 99999 PR PBB SHADOW E&M-EST. PATIENT-LVL III: CPT | Mod: PBBFAC,,, | Performed by: INTERNAL MEDICINE

## 2019-11-27 PROCEDURE — 99999 PR PBB SHADOW E&M-EST. PATIENT-LVL III: ICD-10-PCS | Mod: PBBFAC,,, | Performed by: INTERNAL MEDICINE

## 2019-11-27 PROCEDURE — 85025 COMPLETE CBC W/AUTO DIFF WBC: CPT

## 2019-11-27 PROCEDURE — 96360 HYDRATION IV INFUSION INIT: CPT

## 2019-11-27 PROCEDURE — 99215 PR OFFICE/OUTPT VISIT, EST, LEVL V, 40-54 MIN: ICD-10-PCS | Mod: S$PBB,,, | Performed by: INTERNAL MEDICINE

## 2019-11-27 PROCEDURE — 99213 OFFICE O/P EST LOW 20 MIN: CPT | Mod: PBBFAC | Performed by: INTERNAL MEDICINE

## 2019-11-27 PROCEDURE — 86140 C-REACTIVE PROTEIN: CPT

## 2019-11-27 PROCEDURE — 99215 OFFICE O/P EST HI 40 MIN: CPT | Mod: S$PBB,,, | Performed by: INTERNAL MEDICINE

## 2019-11-27 RX ORDER — HEPARIN 100 UNIT/ML
500 SYRINGE INTRAVENOUS
Status: COMPLETED | OUTPATIENT
Start: 2019-11-27 | End: 2019-11-27

## 2019-11-27 RX ADMIN — SODIUM CHLORIDE: 0.9 INJECTION, SOLUTION INTRAVENOUS at 12:11

## 2019-11-27 RX ADMIN — SODIUM CHLORIDE: 0.9 INJECTION, SOLUTION INTRAVENOUS at 11:11

## 2019-11-27 RX ADMIN — HEPARIN 500 UNITS: 100 SYRINGE at 01:11

## 2019-11-27 NOTE — PROGRESS NOTES
Subjective:       Patient ID: Ivy Salgado is a 39 y.o. female.    Chief Complaint: Crohn's Disease    HPI  Review of Systems   Constitutional: Negative for activity change, appetite change, chills, diaphoresis, fatigue, fever and unexpected weight change.   HENT: Positive for congestion. Negative for ear pain, mouth sores, nosebleeds, postnasal drip, rhinorrhea, sinus pressure, sore throat, trouble swallowing and voice change.    Eyes: Negative for pain.   Respiratory: Negative for cough, shortness of breath and wheezing.    Cardiovascular: Negative for chest pain, palpitations and leg swelling.   Genitourinary: Negative for difficulty urinating, dysuria, flank pain, hematuria and menstrual problem.   Musculoskeletal: Positive for arthralgias and myalgias. Negative for back pain, gait problem, joint swelling and neck pain.   Skin: Negative for rash.   Neurological: Negative for dizziness, tremors, syncope, numbness and headaches.   Hematological: Negative for adenopathy. Does not bruise/bleed easily.   Psychiatric/Behavioral: Negative for agitation, behavioral problems, confusion, decreased concentration and dysphoric mood. The patient is not nervous/anxious.        Objective:      Physical Exam   Constitutional: She is oriented to person, place, and time. She appears well-developed and well-nourished. No distress.   HENT:   Head: Normocephalic and atraumatic.   Right Ear: External ear normal.   Left Ear: External ear normal.   Nose: Nose normal.   Mouth/Throat: Oropharynx is clear and moist. No oropharyngeal exudate.   Eyes: Pupils are equal, round, and reactive to light. Conjunctivae are normal. No scleral icterus.   Neck: Normal range of motion. Neck supple. No thyromegaly present.   Cardiovascular: Normal rate, regular rhythm, normal heart sounds and intact distal pulses. Exam reveals no gallop.   No murmur heard.  Pulmonary/Chest: Effort normal and breath sounds normal. She has no wheezes. She has no  rales.   Abdominal: Soft. Normal appearance and bowel sounds are normal. She exhibits no shifting dullness, no distension, no fluid wave, no abdominal bruit and no mass. There is no hepatosplenomegaly. There is no tenderness.       Musculoskeletal: Normal range of motion. She exhibits no edema or tenderness.   Lymphadenopathy:     She has no cervical adenopathy.   Neurological: She is alert and oriented to person, place, and time. No cranial nerve deficit.   Skin: Skin is warm and dry. No rash noted.   Psychiatric: She has a normal mood and affect. Her behavior is normal. Judgment and thought content normal.       Assessment:       1. Crohn's disease of small intestine with complication    2. S/P ileostomy        Plan:         Ivy is here for a follow-up.  She has 39-year-old woman with longstanding Crohn's disease. She had multiple surgeries and multiple complications. Multiple medications have been tried.  Right now she is on entyvio.  Our last MR enterography in March did not show any recurrence disease in the small bowel.  She has end ileostomy.    Her main complaints relate to the high output from the ileostomy.  It has been a consistent high-output level for 1 year.  Right now she is requiring 8 Imodium and for Lomotil is a every day.  If she tries doses higher this this will lead to abdominal cramps.  Despite this use requiring IV fluids possibly every 2 weeks.  I had multiple on line encounters with her.  Most these relate to pain. I have started gabapentin and this seems to help with the pain. She is definitely getting relief from her  bone and muscle pain. She does have occasional abdominal pain. That does not seem severe right now.    Assessment  One Crohn's disease.  Right now were treating this with entyvio.  I will  probably get a MR enterography early next year.  I will check labs.  I do not think there is any active disease at this time.  2.  Status post ileostomy.  I do not think she efficiently  has short gut syndrome and certainly this is a high output problem.  I had a conference with the [facility] stomal therapist.  They had suggested using continuous daily codeine as way to reduce the output.  This is problematic in Ivy.  I queried the [facility].  That would be considered a very high-risk medicine considering her other medications.  That is the reason I chose gabapentin to help her with the pain. SI do not think overall considering her other medications, with the codeine be a good alternative treatment.    Recommendation  1.  CBC CMP CRP now 2.  Probable MR enterography in the spring  3.  Continue entyvio  4.  Update therapy plan    40 min appointment greater half time face-to-face counseling

## 2019-12-04 ENCOUNTER — TELEPHONE (OUTPATIENT)
Dept: OBSTETRICS AND GYNECOLOGY | Facility: CLINIC | Age: 39
End: 2019-12-04

## 2019-12-04 NOTE — TELEPHONE ENCOUNTER
[facility] Bi-est compounded product and a testosterone but I have never prescribed the Testosterone only the Bi-est. When was the last time labs were done??

## 2019-12-05 ENCOUNTER — TELEPHONE (OUTPATIENT)
Dept: GASTROENTEROLOGY | Facility: CLINIC | Age: 39
End: 2019-12-05

## 2019-12-05 NOTE — TELEPHONE ENCOUNTER
----- Message from Amanda Malloy sent at 12/5/2019  3:20 PM CST -----  Contact: Mayra from [facility]  Mayra called because patient called in to get a prescription for Testosterone .     Please call for a verbal confirmation at [phone].

## 2019-12-05 NOTE — TELEPHONE ENCOUNTER
----- Message from Donavan Aldana sent at 12/5/2019  4:49 PM CST -----  Contact: [phone]/self  Patient states she's returning your call   Please advise

## 2019-12-05 NOTE — TELEPHONE ENCOUNTER
Please advise on patient's portal response.   Also I looked at her most recent labs through [facility] and it appears as far as hormones being checked, in the last  6 months she has had her Estradiol, FSH, TSH and LH checked

## 2019-12-05 NOTE — TELEPHONE ENCOUNTER
----- Message from Krystle Barrett sent at 12/5/2019  1:24 PM CST -----  Contact: pt  Pt would like to know if she can get a hepatitis B booster while taking antivia. Please message pt on [facility] with answer.

## 2019-12-05 NOTE — TELEPHONE ENCOUNTER
Spoke with patient, she states Sil has been giving her a topical Testosterone gel since she first started the oral hormones.  Her last saliva test with Sil was about 6 weeks ago.

## 2019-12-06 ENCOUNTER — PATIENT MESSAGE (OUTPATIENT)
Dept: ENDOSCOPY | Facility: HOSPITAL | Age: 39
End: 2019-12-06

## 2019-12-06 NOTE — TELEPHONE ENCOUNTER
MD Allison Cornelius MA   Caller: Unspecified (Yesterday,  5:34 PM)             I don't see why not      Left message and sent message

## 2019-12-10 ENCOUNTER — OFFICE VISIT (OUTPATIENT)
Dept: OBSTETRICS AND GYNECOLOGY | Facility: CLINIC | Age: 39
End: 2019-12-10
Payer: MEDICAID

## 2019-12-10 ENCOUNTER — TELEPHONE (OUTPATIENT)
Dept: OBSTETRICS AND GYNECOLOGY | Facility: CLINIC | Age: 39
End: 2019-12-10

## 2019-12-10 VITALS
HEIGHT: 61 IN | WEIGHT: 119.06 LBS | BODY MASS INDEX: 22.48 KG/M2 | SYSTOLIC BLOOD PRESSURE: 124 MMHG | DIASTOLIC BLOOD PRESSURE: 72 MMHG

## 2019-12-10 DIAGNOSIS — N83.201 CYST OF RIGHT OVARY: Primary | ICD-10-CM

## 2019-12-10 DIAGNOSIS — E89.40 PREMATURE SURGICAL MENOPAUSE: ICD-10-CM

## 2019-12-10 PROCEDURE — 99213 PR OFFICE/OUTPT VISIT, EST, LEVL III, 20-29 MIN: ICD-10-PCS | Mod: S$PBB,,, | Performed by: SPECIALIST

## 2019-12-10 PROCEDURE — 99999 PR PBB SHADOW E&M-EST. PATIENT-LVL IV: CPT | Mod: PBBFAC,,, | Performed by: SPECIALIST

## 2019-12-10 PROCEDURE — 99999 PR PBB SHADOW E&M-EST. PATIENT-LVL IV: ICD-10-PCS | Mod: PBBFAC,,, | Performed by: SPECIALIST

## 2019-12-10 PROCEDURE — 99214 OFFICE O/P EST MOD 30 MIN: CPT | Mod: PBBFAC,PN | Performed by: SPECIALIST

## 2019-12-10 PROCEDURE — 99213 OFFICE O/P EST LOW 20 MIN: CPT | Mod: S$PBB,,, | Performed by: SPECIALIST

## 2019-12-10 NOTE — TELEPHONE ENCOUNTER
----- Message from Quiana Mcmullen sent at 12/10/2019  1:12 PM CST -----  Contact: kierra [phone]  Would like to get Testosterone gel

## 2019-12-10 NOTE — TELEPHONE ENCOUNTER
Spoke with April at [facility].  Notified we are waiting on the patient's saliva test before  can approve the Testosterone.  April assured me she will look into it and get back with me

## 2019-12-10 NOTE — PROGRESS NOTES
38 yo Wf with H/O hysterectomy with apparent ovarian remnant with right ovarina cystic tissue present per recent imaging. Pt currently on HRT consisting of Biest /Test compound from from [employer]. Pt states she was recently switched to po dosing but feels this is not as effective in terms of emotional lability as well as physical s/s,ie VMFS. Review FSH and E2 levesl and are physiologic on compound.  I discussed and reveiwed recent pelvic u/s and appears to show normal flow which would be consistent with ovarian remnant. Pt denies pelvic pain and no history of neoplastic process.  .  Past Medical History:   Diagnosis Date    Abnormal Pap smear 2007    Abnormal Pap smear 5/26/2011    Anemia     Anxiety     Arthritis     C. difficile diarrhea     Crohn's disease     Depression 8/5/2017    Encounter for blood transfusion     Genital HSV     History of colposcopy with cervical biopsy 2007 and 7/2011 2007-LYLA I  and 7/2011- LYLA I    Hypertension     Kidney stone     Kidney stone     Melanoma     Recurrent UTI 4/3/2013    S/P ileostomy 7/9/2012    Sterilization 6/23/2012       Past Surgical History:   Procedure Laterality Date    ABDOMINAL SURGERY      APPENDECTOMY      BILATERAL SALPINGO-OOPHORECTOMY (BSO) Bilateral 5/30/2019    Procedure: SALPINGO-OOPHORECTOMY, BILATERAL;  Surgeon: Rupa German MD;  Location: [facility] OR [address] [place] [address];  Service: OB/GYN;  Laterality: Bilateral;    BLADDER SURGERY      partial cystectomy due to fistula    CK      COLON SURGERY      COLONOSCOPY      EXCISION OF MELANOMA  07/17/2019    ILEOSTOMY      LYSIS OF ADHESIONS N/A 5/30/2019    Procedure: LYSIS, ADHESIONS;  Surgeon: Rupa German MD;  Location: [facility] OR [address] [place] [address];  Service: OB/GYN;  Laterality: N/A;    OOPHORECTOMY Right 04/16/2015    PORTACATH PLACEMENT  02/21/2017    SKIN BIOPSY      SMALL INTESTINE SURGERY      age 18 Y    TOTAL ABDOMINAL HYSTERECTOMY  04/16/2015    TOTAL COLECTOMY      TUBAL  LIGATION  06/06/2012    UPPER GASTROINTESTINAL ENDOSCOPY         Family History   Problem Relation Age of Onset    Colon cancer Father     Cancer Father         colon cancer    Liver cancer Father     Hypertension Mother     Cancer Maternal Grandfather         esophageal      Skin cancer Maternal Grandfather     Endometrial cancer Maternal Aunt     Crohn's disease Brother     Breast cancer Neg Hx     Ovarian cancer Neg Hx     Melanoma Neg Hx        Social History     Socioeconomic History    Marital status: Single     Spouse name: Not on file    Number of children: Not on file    Years of education: Not on file    Highest education level: Not on file   Occupational History     Employer: [employer]   Social Needs    Financial resource strain: Not on file    Food insecurity:     Worry: Not on file     Inability: Not on file    Transportation needs:     Medical: Not on file     Non-medical: Not on file   Tobacco Use    Smoking status: Never Smoker    Smokeless tobacco: Never Used   Substance and Sexual Activity    Alcohol use: Not Currently     Alcohol/week: 0.0 standard drinks    Drug use: No    Sexual activity: Yes     Partners: Male     Birth control/protection: Surgical     Comment: GELA   Lifestyle    Physical activity:     Days per week: Not on file     Minutes per session: Not on file    Stress: Not on file   Relationships    Social connections:     Talks on phone: Not on file     Gets together: Not on file     Attends Hindu service: Not on file     Active member of club or organization: Not on file     Attends meetings of clubs or organizations: Not on file     Relationship status: Not on file   Other Topics Concern    Are you pregnant or think you may be? No    Breast-feeding No   Social History Narrative    Not on file       Current Outpatient Medications   Medication Sig Dispense Refill    cephALEXin (KEFLEX) 250 MG capsule TAKE 1 CAPSULE(250 MG) BY MOUTH  EVERY DAY 30 capsule 11    clonazePAM (KLONOPIN) 1 MG tablet TAKE 1 TABLET BY MOUTH TWICE DAILY AS NEEDED FOR ANXIETY 60 tablet 0    diphenoxylate-atropine 2.5-0.025 mg (LOMOTIL) 2.5-0.025 mg per tablet TAKE 1 TABLET BY MOUTH FOUR TIMES DAILY AS NEEDED FOR DIARRHEA 100 tablet 0    dronabinol (MARINOL) 5 MG capsule TAKE 1 CAPSULE BY MOUTH TWICE DAILY BEFORE A MEAL 60 capsule 0    food supplemt, lactose-reduced (BOOST BREEZE NUTRITIONAL) 0.04-1.05 gram-kcal/mL Liqd Use 3 times per day (Patient taking differently: Use 1 time per day) 6399 mL 5    loperamide (IMODIUM) 2 mg capsule Take 2 mg by mouth daily as needed for Diarrhea.      mirtazapine (REMERON SOL-TAB) 30 MG disintegrating tablet Take 1 tablet (30 mg total) by mouth nightly. 90 tablet 3    multivitamin (ONE DAILY MULTIVITAMIN) per tablet Take 1 tablet by mouth once daily.      potassium citrate (UROCIT-K 15) 15 mEq TbSR Take 2 tablets by mouth 2 (two) times daily. 360 tablet 3    promethazine (PHENERGAN) 25 MG tablet TAKE 1 TABLET(25 MG) BY MOUTH EVERY 6 HOURS AS NEEDED 30 tablet 0    valACYclovir (VALTREX) 500 MG tablet TAKE 1 TABLET BY MOUTH EVERY DAY 90 tablet 2    vedolizumab (ENTYVIO) 300 mg SolR injection Inject 300 mg into the vein.      zolpidem (AMBIEN) 10 mg Tab TAKE 1 TABLET BY MOUTH EVERY NIGHT AT BEDTIME 30 tablet 0    acetaminophen-codeine 300-60mg (TYLENOL #4) 300-60 mg Tab Take 1 tablet by mouth every 4 (four) hours as needed (as needed for pain).      gabapentin (NEURONTIN) 300 MG capsule Take 1 capsule (300 mg total) by mouth once daily. After two weeks, take one twice daily (Patient not taking: Reported on 12/10/2019) 60 capsule 11    oxyCODONE-acetaminophen (PERCOCET)  mg per tablet Take 1 tablet by mouth every 4 (four) hours as needed for Pain. (Patient not taking: Reported on 12/10/2019) 30 tablet 0    ranitidine (ZANTAC) 150 MG tablet Take 150 mg by mouth 2 (two) times daily as needed for Heartburn.       No  current facility-administered medications for this visit.        Review of patient's allergies indicates:   Allergen Reactions    Azathioprine sodium Other (See Comments)     Other reaction(s): pancreatitis  Other reaction(s): pancreatitis    Methotrexate analogues Other (See Comments)     leukopenia    Stelara [ustekinumab] Other (See Comments)     Multiple infections    Zofran [ondansetron hcl (pf)] Other (See Comments)     Per patient causes prolong QT    Vancomycin analogues Hives    Morphine Itching and Other (See Comments)     Other reaction(s): Itching    Bactrim [sulfamethoxazole-trimethoprim] Palpitations    Ciprofloxacin Palpitations       Review of System:   General: no chills, fever, night sweats, weight gain or weight loss  Psychological: no depression or suicidal ideation  Breasts: no new or changing breast lumps, nipple discharge or masses.  Respiratory: no cough, shortness of breath, or wheezing  Cardiovascular: no chest pain or dyspnea on exertion  Gastrointestinal: no abdominal pain, change in bowel habits, or black or bloody stools  Genito-Urinary: no incontinence, urinary frequency/urgency or vulvar/vaginal symptoms, pelvic pain or abnormal vaginal bleeding.  Musculoskeletal: no gait disturbance or muscular weakness    20 min discussion and rec   Return to Biest/Test topical application  Repeat pelvic u/s with doppler flow to assess any adnexal chane  If stable, no intervention indicated

## 2019-12-12 ENCOUNTER — PATIENT MESSAGE (OUTPATIENT)
Dept: DERMATOLOGY | Facility: CLINIC | Age: 39
End: 2019-12-12

## 2019-12-13 DIAGNOSIS — F41.9 ANXIETY: ICD-10-CM

## 2019-12-13 RX ORDER — VALACYCLOVIR HYDROCHLORIDE 500 MG/1
TABLET, FILM COATED ORAL
Qty: 90 TABLET | Refills: 2 | Status: SHIPPED | OUTPATIENT
Start: 2019-12-13 | End: 2021-03-25 | Stop reason: SDUPTHER

## 2019-12-13 RX ORDER — ZOLPIDEM TARTRATE 10 MG/1
TABLET ORAL
Qty: 30 TABLET | Refills: 0 | Status: SHIPPED | OUTPATIENT
Start: 2019-12-13 | End: 2020-01-13

## 2019-12-15 RX ORDER — PROMETHAZINE HYDROCHLORIDE 25 MG/1
TABLET ORAL
Qty: 30 TABLET | Refills: 0 | Status: SHIPPED | OUTPATIENT
Start: 2019-12-15 | End: 2020-01-13

## 2019-12-18 ENCOUNTER — PATIENT MESSAGE (OUTPATIENT)
Dept: INFECTIOUS DISEASES | Facility: CLINIC | Age: 39
End: 2019-12-18

## 2019-12-18 DIAGNOSIS — F41.1 GENERALIZED ANXIETY DISORDER: ICD-10-CM

## 2019-12-18 RX ORDER — CLONAZEPAM 1 MG/1
1 TABLET ORAL 2 TIMES DAILY
Qty: 60 TABLET | Refills: 0 | Status: SHIPPED | OUTPATIENT
Start: 2019-12-18 | End: 2020-01-27

## 2019-12-19 ENCOUNTER — OCCUPATIONAL HEALTH (OUTPATIENT)
Dept: URGENT CARE | Facility: CLINIC | Age: 39
End: 2019-12-19

## 2019-12-19 DIAGNOSIS — K50.819 CROHN'S DISEASE OF SMALL AND LARGE INTESTINES WITH COMPLICATION: ICD-10-CM

## 2019-12-19 DIAGNOSIS — Z02.89 ENCOUNTER FOR EXAMINATION REQUIRED BY DEPARTMENT OF TRANSPORTATION (DOT): Primary | ICD-10-CM

## 2019-12-19 DIAGNOSIS — Z79.899 ENCOUNTER FOR LONG-TERM (CURRENT) USE OF HIGH-RISK MEDICATION: ICD-10-CM

## 2019-12-19 DIAGNOSIS — B37.2 SKIN YEAST INFECTION: ICD-10-CM

## 2019-12-19 DIAGNOSIS — K50.819 CROHN'S DISEASE OF BOTH SMALL AND LARGE INTESTINE WITH COMPLICATION: ICD-10-CM

## 2019-12-19 PROCEDURE — 80305 DRUG TEST PRSMV DIR OPT OBS: CPT | Mod: S$GLB,,, | Performed by: NURSE PRACTITIONER

## 2019-12-19 PROCEDURE — 80305 OOH COLLECTION ONLY DRUG SCREEN: ICD-10-PCS | Mod: S$GLB,,, | Performed by: NURSE PRACTITIONER

## 2019-12-19 RX ORDER — DIPHENOXYLATE HYDROCHLORIDE AND ATROPINE SULFATE 2.5; .025 MG/1; MG/1
TABLET ORAL
Qty: 100 TABLET | Refills: 1 | Status: SHIPPED | OUTPATIENT
Start: 2019-12-19 | End: 2020-02-27

## 2019-12-19 RX ORDER — FLUCONAZOLE 100 MG/1
TABLET ORAL
Qty: 7 TABLET | Refills: 0 | Status: SHIPPED | OUTPATIENT
Start: 2019-12-19 | End: 2020-01-21

## 2019-12-19 RX ORDER — DRONABINOL 5 MG/1
CAPSULE ORAL
Qty: 60 CAPSULE | Refills: 1 | Status: SHIPPED | OUTPATIENT
Start: 2019-12-19 | End: 2020-02-18

## 2019-12-20 DIAGNOSIS — K50.819 CROHN'S DISEASE OF SMALL AND LARGE INTESTINES WITH COMPLICATION: Primary | ICD-10-CM

## 2019-12-26 ENCOUNTER — CLINICAL SUPPORT (OUTPATIENT)
Dept: INFECTIOUS DISEASES | Facility: CLINIC | Age: 39
End: 2019-12-26
Payer: MEDICAID

## 2019-12-26 DIAGNOSIS — K50.819 CROHN'S DISEASE OF SMALL AND LARGE INTESTINES WITH COMPLICATION: ICD-10-CM

## 2019-12-26 PROCEDURE — 90471 IMMUNIZATION ADMIN: CPT | Mod: PBBFAC

## 2019-12-31 ENCOUNTER — PATIENT MESSAGE (OUTPATIENT)
Dept: OBSTETRICS AND GYNECOLOGY | Facility: CLINIC | Age: 39
End: 2019-12-31

## 2020-01-03 ENCOUNTER — PATIENT MESSAGE (OUTPATIENT)
Dept: DERMATOLOGY | Facility: CLINIC | Age: 40
End: 2020-01-03

## 2020-01-05 ENCOUNTER — PATIENT MESSAGE (OUTPATIENT)
Dept: INTERNAL MEDICINE | Facility: CLINIC | Age: 40
End: 2020-01-05

## 2020-01-05 ENCOUNTER — PATIENT MESSAGE (OUTPATIENT)
Dept: DERMATOLOGY | Facility: CLINIC | Age: 40
End: 2020-01-05

## 2020-01-05 DIAGNOSIS — Z00.00 ANNUAL PHYSICAL EXAM: Primary | ICD-10-CM

## 2020-01-10 ENCOUNTER — PATIENT MESSAGE (OUTPATIENT)
Dept: GASTROENTEROLOGY | Facility: CLINIC | Age: 40
End: 2020-01-10

## 2020-01-10 ENCOUNTER — OFFICE VISIT (OUTPATIENT)
Dept: DERMATOLOGY | Facility: CLINIC | Age: 40
End: 2020-01-10
Payer: MEDICAID

## 2020-01-10 DIAGNOSIS — Z86.006 HISTORY OF MELANOMA IN SITU: ICD-10-CM

## 2020-01-10 DIAGNOSIS — D18.01 ANGIOMA OF SKIN: ICD-10-CM

## 2020-01-10 DIAGNOSIS — D48.5 NEOPLASM OF UNCERTAIN BEHAVIOR OF SKIN: Primary | ICD-10-CM

## 2020-01-10 DIAGNOSIS — D22.5 MULTIPLE BENIGN MELANOCYTIC NEVI OF UPPER EXTREMITY, LOWER EXTREMITY, AND TRUNK: ICD-10-CM

## 2020-01-10 DIAGNOSIS — D22.30 MELANOCYTIC NEVI OF FACE: ICD-10-CM

## 2020-01-10 DIAGNOSIS — D22.60 MULTIPLE BENIGN MELANOCYTIC NEVI OF UPPER EXTREMITY, LOWER EXTREMITY, AND TRUNK: ICD-10-CM

## 2020-01-10 DIAGNOSIS — L81.4 LENTIGINES: ICD-10-CM

## 2020-01-10 DIAGNOSIS — D22.70 MULTIPLE BENIGN MELANOCYTIC NEVI OF UPPER EXTREMITY, LOWER EXTREMITY, AND TRUNK: ICD-10-CM

## 2020-01-10 PROCEDURE — 88305 TISSUE EXAM BY PATHOLOGIST: CPT | Mod: 26,,, | Performed by: DERMATOLOGY

## 2020-01-10 PROCEDURE — 11102 TANGNTL BX SKIN SINGLE LES: CPT | Mod: S$GLB,,, | Performed by: DERMATOLOGY

## 2020-01-10 PROCEDURE — 99214 OFFICE O/P EST MOD 30 MIN: CPT | Mod: 25,S$GLB,, | Performed by: DERMATOLOGY

## 2020-01-10 PROCEDURE — 11102 PR TANGENTIAL BIOPSY, SKIN, SINGLE LESION: ICD-10-PCS | Mod: S$GLB,,, | Performed by: DERMATOLOGY

## 2020-01-10 PROCEDURE — 88305 TISSUE EXAM BY PATHOLOGIST: CPT | Performed by: DERMATOLOGY

## 2020-01-10 PROCEDURE — 99214 PR OFFICE/OUTPT VISIT, EST, LEVL IV, 30-39 MIN: ICD-10-PCS | Mod: 25,S$GLB,, | Performed by: DERMATOLOGY

## 2020-01-10 PROCEDURE — 88305 TISSUE EXAM BY PATHOLOGIST: ICD-10-PCS | Mod: 26,,, | Performed by: DERMATOLOGY

## 2020-01-10 RX ORDER — FAMOTIDINE 20 MG/1
20 TABLET, FILM COATED ORAL 2 TIMES DAILY
COMMUNITY
End: 2022-03-30

## 2020-01-10 NOTE — PROGRESS NOTES
Subjective:       Patient ID:  Ivy Salgado is a 39 y.o. female who presents for   Chief Complaint   Patient presents with    Skin Cancer     Hx MMIS     This is a high risk patient with a history of severely atypical nevus/early evolving melanoma in situ (right lower abdomen, 5/23/2019) who is here today to check for the development of new lesions. She denies any changes or darkening in or around the scar on her abdomen.    Mole  - Initial  Affected locations: chest and neck  Duration: 10 years  Signs / symptoms: growing  Severity: mild to moderate  Timing: constant  Aggravated by: pressure and friction  Relieving factors/Treatments tried: nothing      Review of Systems   Constitutional: Negative for fever, chills, weight loss, fatigue, night sweats and malaise.   HENT: Negative for headaches.    Respiratory: Negative for cough and shortness of breath.    Gastrointestinal: Negative for indigestion.   Musculoskeletal: Negative for joint swelling and arthralgias.   Skin: Positive for activity-related sunscreen use. Negative for daily sunscreen use, recent sunburn and wears hat.   Neurological: Negative for headaches.   Hematologic/Lymphatic: Negative for adenopathy. Does not bruise/bleed easily.        Objective:    Physical Exam   Constitutional: She appears well-developed and well-nourished. No distress.   HENT:   Mouth/Throat: Lips normal.    Eyes: Lids are normal.  No conjunctival no injection.   Lymphadenopathy: No supraclavicular adenopathy is present.     She has no cervical adenopathy.     She has no axillary adenopathy.     She has no inguinal adenopathy.   Neurological: She is alert and oriented to person, place, and time. She is not disoriented.   Psychiatric: She has a normal mood and affect.   Skin:   Areas Examined (abnormalities noted in diagram):   Scalp / Hair Palpated and Inspected  Head / Face Inspection Performed  Neck Inspection Performed  Chest / Axilla Inspection Performed  Abdomen  Inspection Performed  Genitals / Buttocks / Groin Inspection Performed  Back Inspection Performed  RUE Inspected  LUE Inspection Performed  RLE Inspected  LLE Inspection Performed  Nails and Digits Inspection Performed  Gland Inspection Performed                       Diagram Legend     Erythematous scaling macule/papule c/w actinic keratosis       Vascular papule c/w angioma      Pigmented verrucoid papule/plaque c/w seborrheic keratosis      Yellow umbilicated papule c/w sebaceous hyperplasia      Irregularly shaped tan macule c/w lentigo     1-2 mm smooth white papules consistent with Milia      Movable subcutaneous cyst with punctum c/w epidermal inclusion cyst      Subcutaneous movable cyst c/w pilar cyst      Firm pink to brown papule c/w dermatofibroma      Pedunculated fleshy papule(s) c/w skin tag(s)      Evenly pigmented macule c/w junctional nevus     Mildly variegated pigmented, slightly irregular-bordered macule c/w mildly atypical nevus      Flesh colored to evenly pigmented papule c/w intradermal nevus       Pink pearly papule/plaque c/w basal cell carcinoma      Erythematous hyperkeratotic cursted plaque c/w SCC      Surgical scar with no sign of skin cancer recurrence      Open and closed comedones      Inflammatory papules and pustules      Verrucoid papule consistent consistent with wart     Erythematous eczematous patches and plaques     Dystrophic onycholytic nail with subungual debris c/w onychomycosis     Umbilicated papule    Erythematous-base heme-crusted tan verrucoid plaque consistent with inflamed seborrheic keratosis     Erythematous Silvery Scaling Plaque c/w Psoriasis     See annotation      Assessment / Plan:      Pathology Orders:     Normal Orders This Visit    Specimen to Pathology, Dermatology     Comments:    Number of Specimens:->1  ------------------------->-------------------------  Spec 1 Procedure:->Biopsy  Spec 1 Clinical Impression:->r/o irritated nevus  Spec 1  Source:->right inferior chest    Questions:    Procedure Type:  Dermatology and skin neoplasms    Number of Specimens:  1    ------------------------:  -------------------------    Spec 1 Procedure:  Biopsy    Spec 1 Clinical Impression:  r/o irritated vs dysplastic nevus    Spec 1 Source:  right inferior chest        Neoplasm of uncertain behavior of skin  -     Specimen to Pathology, Dermatology  Shave biopsy procedure note:  Risk, benefits, and alternatives of biopsy are discussed with the patient, including risk of infection, scar, recurrence, and need for additional treatment of site. The patient agrees to the procedure by verbal consent. The area is marked and prepped with alcohol.  Approximately 1 mL of lidocaine 1% with epinephrine is used for local anesthesia. A sharp blade is used to remove a portion of the lesion. The specimen is sent for pathology. Hemostasis is obtained with aluminum chloride and/or monopolar hyfrecation if needed. The area is then dressed and bandaged. The patient tolerated the procedure well without adverse event. Written instructions on wound care were given and were reviewed with the patient, who is to call for any signs of bleeding or infection. The patient will be notified of the pathology results.    Melanocytic nevi of face  Multiple benign melanocytic nevi of upper extremity, lower extremity, and trunk  Multiple benign-appearing nevi present on exam today. Reassurance provided. Counseled patient to periodically examine moles and return to clinic if any changes in size, shape, or color are noted or if it becomes symptomatic (bleeding, itching, pain, etc).    History of melanoma in situ  Total body skin examination performed today including at least 12 points as noted in physical examination. No lesions suspicious for malignancy noted.  Patient instructed in importance of daily broad-spectrum sunscreen use with SPF of at least 30. Sun avoidance and topical protection/protective  clothing discussed.    Angioma of skin  This is a benign vascular lesion. Reassurance given. No treatment required.    Lentigines  These are benign sun spots which should be monitored for changes. Daily sun protection will reduce the number of new lesions.   Patient instructed in importance of daily broad-spectrum sunscreen use with SPF of at least 30. Sun avoidance and topical protection/protective clothing discussed.      Follow up in about 3 months (around 4/10/2020) for TBSE, or sooner dependent on pathology results.

## 2020-01-11 DIAGNOSIS — K50.019 CROHN'S DISEASE OF SMALL INTESTINE WITH COMPLICATION: Primary | Chronic | ICD-10-CM

## 2020-01-12 DIAGNOSIS — F41.9 ANXIETY: ICD-10-CM

## 2020-01-13 RX ORDER — PROMETHAZINE HYDROCHLORIDE 25 MG/1
TABLET ORAL
Qty: 30 TABLET | Refills: 0 | Status: SHIPPED | OUTPATIENT
Start: 2020-01-13 | End: 2020-02-13 | Stop reason: SDUPTHER

## 2020-01-13 RX ORDER — ZOLPIDEM TARTRATE 10 MG/1
TABLET ORAL
Qty: 30 TABLET | Refills: 1 | Status: SHIPPED | OUTPATIENT
Start: 2020-01-13 | End: 2020-03-18

## 2020-01-16 DIAGNOSIS — B37.2 SKIN YEAST INFECTION: ICD-10-CM

## 2020-01-21 ENCOUNTER — PATIENT MESSAGE (OUTPATIENT)
Dept: DERMATOLOGY | Facility: CLINIC | Age: 40
End: 2020-01-21

## 2020-01-21 RX ORDER — FLUCONAZOLE 100 MG/1
TABLET ORAL
Qty: 7 TABLET | Refills: 0 | Status: SHIPPED | OUTPATIENT
Start: 2020-01-21 | End: 2020-05-20

## 2020-01-22 LAB
FINAL PATHOLOGIC DIAGNOSIS: NORMAL
GROSS: NORMAL
MICROSCOPIC EXAM: NORMAL

## 2020-01-23 ENCOUNTER — CLINICAL SUPPORT (OUTPATIENT)
Dept: INFECTIOUS DISEASES | Facility: CLINIC | Age: 40
End: 2020-01-23
Payer: MEDICAID

## 2020-01-23 ENCOUNTER — PATIENT MESSAGE (OUTPATIENT)
Dept: INFECTIOUS DISEASES | Facility: CLINIC | Age: 40
End: 2020-01-23

## 2020-01-23 DIAGNOSIS — K50.819 CROHN'S DISEASE OF SMALL AND LARGE INTESTINES WITH COMPLICATION: ICD-10-CM

## 2020-01-23 PROCEDURE — 90471 IMMUNIZATION ADMIN: CPT | Mod: PBBFAC

## 2020-01-27 DIAGNOSIS — F41.1 GENERALIZED ANXIETY DISORDER: ICD-10-CM

## 2020-01-27 RX ORDER — CLONAZEPAM 1 MG/1
TABLET ORAL
Qty: 60 TABLET | Refills: 0 | Status: SHIPPED | OUTPATIENT
Start: 2020-01-27 | End: 2020-02-24 | Stop reason: SDUPTHER

## 2020-01-28 ENCOUNTER — INFUSION (OUTPATIENT)
Dept: INFUSION THERAPY | Facility: OTHER | Age: 40
End: 2020-01-28
Attending: INTERNAL MEDICINE
Payer: MEDICAID

## 2020-01-28 VITALS
BODY MASS INDEX: 23.3 KG/M2 | SYSTOLIC BLOOD PRESSURE: 138 MMHG | HEIGHT: 61 IN | DIASTOLIC BLOOD PRESSURE: 75 MMHG | RESPIRATION RATE: 18 BRPM | HEART RATE: 79 BPM | OXYGEN SATURATION: 99 % | TEMPERATURE: 98 F | WEIGHT: 123.44 LBS

## 2020-01-28 DIAGNOSIS — K50.019 CROHN'S DISEASE OF SMALL INTESTINE WITH COMPLICATION: Primary | ICD-10-CM

## 2020-01-28 PROCEDURE — 96361 HYDRATE IV INFUSION ADD-ON: CPT

## 2020-01-28 PROCEDURE — 63600175 PHARM REV CODE 636 W HCPCS: Performed by: INTERNAL MEDICINE

## 2020-01-28 PROCEDURE — 96360 HYDRATION IV INFUSION INIT: CPT

## 2020-01-28 RX ORDER — HEPARIN 100 UNIT/ML
500 SYRINGE INTRAVENOUS
Status: COMPLETED | OUTPATIENT
Start: 2020-01-28 | End: 2020-01-28

## 2020-01-28 RX ADMIN — SODIUM CHLORIDE: 0.9 INJECTION, SOLUTION INTRAVENOUS at 11:01

## 2020-01-28 RX ADMIN — HEPARIN 500 UNITS: 100 SYRINGE at 01:01

## 2020-01-28 RX ADMIN — SODIUM CHLORIDE: 0.9 INJECTION, SOLUTION INTRAVENOUS at 12:01

## 2020-01-28 NOTE — PLAN OF CARE
2 Liters of 0.9% Sodium Chloride administered no reaction. Patient tolerated well. Port A Cath deaccessed. No apparent distress noted. Discharge instructions given to patient. Patient understands instructions.

## 2020-01-29 ENCOUNTER — PATIENT MESSAGE (OUTPATIENT)
Dept: GASTROENTEROLOGY | Facility: CLINIC | Age: 40
End: 2020-01-29

## 2020-01-31 ENCOUNTER — OFFICE VISIT (OUTPATIENT)
Dept: INTERNAL MEDICINE | Facility: CLINIC | Age: 40
End: 2020-01-31
Payer: MEDICAID

## 2020-01-31 VITALS
WEIGHT: 118.19 LBS | OXYGEN SATURATION: 98 % | BODY MASS INDEX: 22.33 KG/M2 | SYSTOLIC BLOOD PRESSURE: 110 MMHG | DIASTOLIC BLOOD PRESSURE: 80 MMHG | HEART RATE: 114 BPM

## 2020-01-31 DIAGNOSIS — N39.0 RECURRENT UTI: Chronic | ICD-10-CM

## 2020-01-31 DIAGNOSIS — R00.2 PALPITATIONS: ICD-10-CM

## 2020-01-31 DIAGNOSIS — E55.9 VITAMIN D DEFICIENCY: ICD-10-CM

## 2020-01-31 DIAGNOSIS — I47.19 ATRIAL TACHYCARDIA: ICD-10-CM

## 2020-01-31 DIAGNOSIS — F41.1 GENERALIZED ANXIETY DISORDER: Primary | ICD-10-CM

## 2020-01-31 DIAGNOSIS — E04.2 MULTIPLE THYROID NODULES: ICD-10-CM

## 2020-01-31 DIAGNOSIS — F32.0 CURRENT MILD EPISODE OF MAJOR DEPRESSIVE DISORDER WITHOUT PRIOR EPISODE: ICD-10-CM

## 2020-01-31 DIAGNOSIS — K50.019 CROHN'S DISEASE OF SMALL INTESTINE WITH COMPLICATION: ICD-10-CM

## 2020-01-31 DIAGNOSIS — M85.89 OSTEOPENIA OF MULTIPLE SITES: ICD-10-CM

## 2020-01-31 DIAGNOSIS — Z93.2 S/P ILEOSTOMY: Chronic | ICD-10-CM

## 2020-01-31 PROCEDURE — 99213 OFFICE O/P EST LOW 20 MIN: CPT | Mod: PBBFAC | Performed by: INTERNAL MEDICINE

## 2020-01-31 PROCEDURE — 99999 PR PBB SHADOW E&M-EST. PATIENT-LVL III: CPT | Mod: PBBFAC,,, | Performed by: INTERNAL MEDICINE

## 2020-01-31 PROCEDURE — 99214 OFFICE O/P EST MOD 30 MIN: CPT | Mod: S$PBB,,, | Performed by: INTERNAL MEDICINE

## 2020-01-31 PROCEDURE — 99999 PR PBB SHADOW E&M-EST. PATIENT-LVL III: ICD-10-PCS | Mod: PBBFAC,,, | Performed by: INTERNAL MEDICINE

## 2020-01-31 PROCEDURE — 99214 PR OFFICE/OUTPT VISIT, EST, LEVL IV, 30-39 MIN: ICD-10-PCS | Mod: S$PBB,,, | Performed by: INTERNAL MEDICINE

## 2020-01-31 RX ORDER — AMOXICILLIN 500 MG/1
500 CAPSULE ORAL EVERY 12 HOURS
Qty: 20 CAPSULE | Refills: 0 | Status: ON HOLD | OUTPATIENT
Start: 2020-01-31 | End: 2020-03-11

## 2020-01-31 RX ORDER — DOXYCYCLINE 100 MG/1
100 CAPSULE ORAL 2 TIMES DAILY
Qty: 14 CAPSULE | Refills: 0 | Status: SHIPPED | OUTPATIENT
Start: 2020-01-31 | End: 2020-01-31

## 2020-01-31 NOTE — PROGRESS NOTES
Subjective:       Patient ID: Ivy Salgado is a 39 y.o. female.    Chief Complaint: Annual Exam and Sore Throat    Pt had L adnexal mass and saw GYN-onc for ex lap/BSO; mass was benign. 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 Per GYN, this will be followed but no further intervention was required. 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 There was difficulty in healing initially but this has improved. She has regular f/u with derm.       She has anxiety which is fairly well controlled with current meds. She uses xanax many nights. She was seeing psychiatry but trying to est with new provider due to insurance change. No SI/HI.      She has had issues with recurrent UTI in the past but not having any symptoms currently. She is on prophylaxis with keflex. She has seen urology.     Crohn's is fairly well controlled. She has some abd pain with nausea and occasional vomiting for which she has d/w GI but doesn't feel this is active crohns. No fever. She is on entyvio. She sees Dr. Ross with GI regularly. He manages her chronic opiates and is on pain contract. She has high output from her stoma which GI is managing but there are no great solutions to this.     GERD is well controlled with prn zantac. No associated red flag symptoms.    She has osteopenia on DEXA 1/2018. Reiterated importance of ca/d. We discussed other meds but she is not on prednisone now and osteopenia is mild so will hold off.     She has thyroid nodules that were small on last u/s 10/2019 and not meeting criteria for biopsy. Clinically she is euthyroid. Repeat u/s can be done in 10/2020.     Pt c/o sore throat with fever to 101 that started yesterday. Some post nasal drip but minimal rhinorrhea. Cough is mildly productive of clear sputum. No sob/wheezing. No LAD. Actually feels a little better  today. 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 She did get flu vaccine this season.     Review of Systems   Constitutional: Negative for fatigue, fever and unexpected weight change.   HENT: Positive for sore throat. Negative for ear pain and rhinorrhea.    Eyes: Negative for visual disturbance.   Respiratory: Positive for cough. Negative for shortness of breath.    Cardiovascular: Negative for chest pain.   Gastrointestinal: Positive for abdominal pain, diarrhea and nausea. Negative for vomiting.   Genitourinary: Negative for dysuria and frequency.   Neurological: Negative for numbness and headaches.   Psychiatric/Behavioral: Negative for dysphoric mood.       Objective:      Physical Exam   Constitutional: She is oriented to person, place, and time. She appears well-developed and well-nourished.   HENT:   Head: Normocephalic and atraumatic.   Right Ear: Tympanic membrane, external ear and ear canal normal.   Left Ear: Tympanic membrane, external ear and ear canal normal.   Nose: No mucosal edema or rhinorrhea.   Mouth/Throat: Posterior oropharyngeal erythema present. No oropharyngeal exudate.   Mild erythema in posterior OP but no exudate   Eyes: Pupils are equal, round, and reactive to light. EOM are normal.   Neck: Neck supple. Carotid bruit is not present. No thyroid mass and no thyromegaly present.   Cardiovascular: Normal rate, regular rhythm, S1 normal, S2 normal and normal heart sounds.   No murmur heard.  Pulmonary/Chest: Effort normal and breath sounds normal. She has no wheezes.   Abdominal: Soft. Bowel sounds are normal. She exhibits no distension and no mass. There is no hepatosplenomegaly. There is tenderness.   Tenderness around stoma and incision but no lit fluctuance/induration/erythema   Musculoskeletal: She exhibits no edema.   Lymphadenopathy:     She has no cervical adenopathy.   Neurological: She is alert and oriented to person, place, and time. No cranial nerve deficit.   Psychiatric: She has a  normal mood and affect. Judgment normal.       Assessment:       1. Generalized anxiety disorder    2. Atrial tachycardia    3. Palpitations    4. Recurrent UTI    5. Multiple thyroid nodules    6. Vitamin D deficiency    7. Crohn's disease of small intestine with complication    8. S/P ileostomy    9. Osteopenia of multiple sites    10. Current mild episode of major depressive disorder without prior episode        Plan:       1. Keep f/u with specialists  2. Continue current meds  3. Prior labs reviewed with pt  4. Suspect viral pharyngitis; however, with wkend and pt's immunocompromised state, will give Rx for amoxicillin if not continuing to improve over next several days; proper use d/w pt; RTC and ED prompts d/w pt and she understood

## 2020-02-04 ENCOUNTER — PATIENT MESSAGE (OUTPATIENT)
Dept: INTERNAL MEDICINE | Facility: CLINIC | Age: 40
End: 2020-02-04

## 2020-02-04 ENCOUNTER — PATIENT MESSAGE (OUTPATIENT)
Dept: GASTROENTEROLOGY | Facility: CLINIC | Age: 40
End: 2020-02-04

## 2020-02-05 ENCOUNTER — PATIENT MESSAGE (OUTPATIENT)
Dept: GASTROENTEROLOGY | Facility: CLINIC | Age: 40
End: 2020-02-05

## 2020-02-05 DIAGNOSIS — R19.7 ACUTE DIARRHEA: Primary | ICD-10-CM

## 2020-02-12 ENCOUNTER — OFFICE VISIT (OUTPATIENT)
Dept: NEUROLOGY | Facility: CLINIC | Age: 40
End: 2020-02-12
Payer: MEDICARE

## 2020-02-12 VITALS
HEIGHT: 61 IN | SYSTOLIC BLOOD PRESSURE: 141 MMHG | WEIGHT: 120.38 LBS | HEART RATE: 89 BPM | DIASTOLIC BLOOD PRESSURE: 90 MMHG | BODY MASS INDEX: 22.73 KG/M2

## 2020-02-12 DIAGNOSIS — R29.818 TRANSIENT NEUROLOGICAL SYMPTOMS: ICD-10-CM

## 2020-02-12 PROCEDURE — 99214 OFFICE O/P EST MOD 30 MIN: CPT | Mod: PBBFAC,PO | Performed by: PSYCHIATRY & NEUROLOGY

## 2020-02-12 PROCEDURE — 99214 OFFICE O/P EST MOD 30 MIN: CPT | Mod: S$PBB,,, | Performed by: PSYCHIATRY & NEUROLOGY

## 2020-02-12 PROCEDURE — 99214 PR OFFICE/OUTPT VISIT, EST, LEVL IV, 30-39 MIN: ICD-10-PCS | Mod: S$PBB,,, | Performed by: PSYCHIATRY & NEUROLOGY

## 2020-02-12 PROCEDURE — 99999 PR PBB SHADOW E&M-EST. PATIENT-LVL IV: CPT | Mod: PBBFAC,,, | Performed by: PSYCHIATRY & NEUROLOGY

## 2020-02-12 PROCEDURE — 99999 PR PBB SHADOW E&M-EST. PATIENT-LVL IV: ICD-10-PCS | Mod: PBBFAC,,, | Performed by: PSYCHIATRY & NEUROLOGY

## 2020-02-12 NOTE — LETTER
February 12, 2020      Kalia Astorga MD  [address]  [facility] [address]           [facility]  [address], [address]  [facility] [address]  Phone: [phone]  Fax: [phone]          Patient: Ivy Salgado   MR Number: [id number]   YOB: 1980   Date of Visit: 2/12/2020       Dear Dr. Kalia Astorga:    Thank you for referring Ivy Salgado to me for evaluation. Attached you will find relevant portions of my assessment and plan of care.    If you have questions, please do not hesitate to call me. I look forward to following Ivy Salgado along with you.    Sincerely,    Jerrod Miller MD    Enclosure  CC:  No Recipients    If you would like to receive this communication electronically, please contact [email or web address] or [phone] to request more information on [employer] Link access.    For providers and/or their staff who would like to refer a patient to [facility], please contact us through our one-stop-shop provider referral line, [facility], at [phone].    If you feel you have received this communication in error or would no longer like to receive these types of communications, please e-mail [email or web address]

## 2020-02-12 NOTE — PROGRESS NOTES
[facility] NEUROLOGY  [facility], [place]    Date: 2/12/20  Patient Name: Ivy Salgado   MRN: [id number]   PCP: Kalia Astorga  Referring Provider: Kalia Astorga MD    Assessment:   Ivy Salgado is a 39 y.o. female  Presenting for evaluation of stereotyped episodes of tremulousness and unresponsiveness.  Patient has undergone MRI brain ( personally reviewed ) which is unremarkable.  Has also undergone routine EEG which is normal. Arranging EMU  admission for event capture. Of note, patient has risk factors for and video provided is concerning for PNEE    Plan:     Problem List Items Addressed This Visit        Neuro    Transient neurological symptoms    Current Assessment & Plan     -- arranging EMU admit               Jerrod Miller MD  [facility]   Department of Neurology    Patient note was created using MModal Dictation.  Any errors in syntax or even information may not have been identified and edited on initial review prior to signing this note.  Subjective:   Patient seen in consultation at the request of Kalia Astorga MD for the evaluation of episodes of unresponsiveness. A copy of this note will be sent to the referring physician.        HPI:   Ms. Ivy Salgado is a 39 y.o. female with past medical history of Crohn's disease s/p total colectomy and ileostomy presenting for approximately 1 year of episodes of palpitations, flushing, tremulousness/convulsion confusion, and loss of awareness.  She states she is unable to respond during the episodes and her father, who accompanies her and contributes to the history, states that she does not appropriately follow commands. The patient describes intermittent episodes occurring since January 2019 occurring with variable frequency last occurring approximately a week ago.  The patient states she is able to tell people that she is about to have an episode and to time the episodes, which last several  minutes up to 20 minutes at a time.  She denies associated tongue biting or incontinence.  She is unaware of any triggers.  She shows a video today  In which she exhibits confusion and diffuse tremulousness  But remains able to place nasal cannula on  Presenting for evaluation her face.  Of note, the patient has a long history of anxiety.  She denies any risk factors for seizures/epilepsy.    PAST MEDICAL HISTORY:  Past Medical History:   Diagnosis Date    Abnormal Pap smear 2007    Abnormal Pap smear 5/26/2011    Anemia     Anxiety     Arthritis     C. difficile diarrhea     Crohn's disease     Depression 8/5/2017    Encounter for blood transfusion     Genital HSV     History of colposcopy with cervical biopsy 2007 and 7/2011 2007-LYLA I  and 7/2011- LYLA I    Hypertension     Kidney stone     Kidney stone     Melanoma     Recurrent UTI 4/3/2013    S/P ileostomy 7/9/2012    Sterilization 6/23/2012       PAST SURGICAL HISTORY:  Past Surgical History:   Procedure Laterality Date    ABDOMINAL SURGERY      APPENDECTOMY      BILATERAL SALPINGO-OOPHORECTOMY (BSO) Bilateral 5/30/2019    Procedure: SALPINGO-OOPHORECTOMY, BILATERAL;  Surgeon: Rupa German MD;  Location: [facility] OR [address] [place] [address];  Service: OB/GYN;  Laterality: Bilateral;    BLADDER SURGERY      partial cystectomy due to fistula    CKC      COLON SURGERY      COLONOSCOPY      EXCISION OF MELANOMA  07/17/2019    ILEOSTOMY      LYSIS OF ADHESIONS N/A 5/30/2019    Procedure: LYSIS, ADHESIONS;  Surgeon: Rupa German MD;  Location: [facility] OR [address] [place] [address];  Service: OB/GYN;  Laterality: N/A;    OOPHORECTOMY Right 04/16/2015    PORTACATH PLACEMENT  02/21/2017    SKIN BIOPSY      SMALL INTESTINE SURGERY      age 18 Y    TOTAL ABDOMINAL HYSTERECTOMY  04/16/2015    TOTAL COLECTOMY      TUBAL LIGATION  06/06/2012    UPPER GASTROINTESTINAL ENDOSCOPY         CURRENT MEDS:  Current Outpatient Medications   Medication Sig Dispense Refill     cephALEXin (KEFLEX) 250 MG capsule TAKE 1 CAPSULE(250 MG) BY MOUTH EVERY DAY 30 capsule 11    clonazePAM (KLONOPIN) 1 MG tablet TAKE 1 TABLET BY MOUTH TWICE DAILY AS NEEDED FOR ANXIETY. 60 tablet 0    diphenoxylate-atropine 2.5-0.025 mg (LOMOTIL) 2.5-0.025 mg per tablet TAKE 1 TABLET BY MOUTH FOUR TIMES DAILY AS NEEDED FOR DIARRHEA 100 tablet 1    dronabinol (MARINOL) 5 MG capsule TAKE 1 CAPSULE BY MOUTH TWICE DAILY BEFORE MEALS 60 capsule 1    famotidine (PEPCID) 20 MG tablet Take 20 mg by mouth 2 (two) times daily.      fluconazole (DIFLUCAN) 100 MG tablet TAKE 1 TABLET BY MOUTH EVERY DAY 7 tablet 0    gabapentin (NEURONTIN) 300 MG capsule Take 1 capsule (300 mg total) by mouth once daily. After two weeks, take one twice daily 60 capsule 11    loperamide (IMODIUM) 2 mg capsule Take 2 mg by mouth daily as needed for Diarrhea.      mirtazapine (REMERON SOL-TAB) 30 MG disintegrating tablet Take 1 tablet (30 mg total) by mouth nightly. 90 tablet 3    multivitamin (ONE DAILY MULTIVITAMIN) per tablet Take 1 tablet by mouth once daily.      potassium citrate (UROCIT-K 15) 15 mEq TbSR Take 2 tablets by mouth 2 (two) times daily. 360 tablet 3    promethazine (PHENERGAN) 25 MG tablet TAKE 1 TABLET(25 MG) BY MOUTH EVERY 6 HOURS AS NEEDED 30 tablet 0    valACYclovir (VALTREX) 500 MG tablet TAKE 1 TABLET BY MOUTH EVERY DAY 90 tablet 2    zolpidem (AMBIEN) 10 mg Tab TAKE 1 TABLET BY MOUTH EVERY NIGHT AT BEDTIME 30 tablet 1    amoxicillin (AMOXIL) 500 MG capsule Take 1 capsule (500 mg total) by mouth every 12 (twelve) hours. (Patient not taking: Reported on 2/12/2020) 20 capsule 0    food supplemt, lactose-reduced (BOOST BREEZE NUTRITIONAL) 0.04-1.05 gram-kcal/mL Liqd Use 3 times per day (Patient not taking: Reported on 1/31/2020) 6399 mL 5    vedolizumab (ENTYVIO) 300 mg SolR injection Inject 300 mg into the vein.       No current facility-administered medications for this visit.        ALLERGIES:  Review of  "patient's allergies indicates:   Allergen Reactions    Azathioprine sodium Other (See Comments)     Other reaction(s): pancreatitis  Other reaction(s): pancreatitis    Methotrexate analogues Other (See Comments)     leukopenia    Stelara [ustekinumab] Other (See Comments)     Multiple infections    Zofran [ondansetron hcl (pf)] Other (See Comments)     Per patient causes prolong QT    Vancomycin analogues Hives    Morphine Itching and Other (See Comments)     Other reaction(s): Itching    Bactrim [sulfamethoxazole-trimethoprim] Palpitations    Ciprofloxacin Palpitations       FAMILY HISTORY:  Family History   Problem Relation Age of Onset    Colon cancer Father     Cancer Father         colon cancer    Liver cancer Father     Hypertension Mother     Cancer Maternal Grandfather         esophageal      Skin cancer Maternal Grandfather     Endometrial cancer Maternal Aunt     Crohn's disease Brother     Breast cancer Neg Hx     Ovarian cancer Neg Hx     Melanoma Neg Hx        SOCIAL HISTORY:  Social History     Tobacco Use    Smoking status: Never Smoker    Smokeless tobacco: Never Used   Substance Use Topics    Alcohol use: Not Currently     Alcohol/week: 0.0 standard drinks    Drug use: No       Review of Systems:  12 system review of systems is negative except for the symptoms mentioned in HPI.      Objective:     Vitals:    02/12/20 1359   BP: (!) 141/90   Pulse: 89   Weight: 54.6 kg (120 lb 5.9 oz)   Height: 5' 1" (1.549 m)     General: NAD, well nourished   Eyes: no tearing, discharge, no erythema   ENT: moist mucous membranes of the oral cavity, nares patent    Neck: Supple, full range of motion  Cardiovascular: Warm and well perfused, pulses equal and symmetrical  Lungs: Normal work of breathing, normal chest wall excursions  Skin: No rash, lesions, or breakdown on exposed skin  Psychiatry: Mood and affect are appropriate   Abdomen: soft, non tender, non distended  Extremeties: No " cyanosis, clubbing or edema.    Neurological   MENTAL STATUS: Alert and oriented to person, place, and time. Attention and concentration within normal limits. Speech without dysarthria, able to name and repeat without difficulty. Recent and remote memory within normal limits   CRANIAL NERVES: Visual fields intact. PERRL. EOMI. Facial sensation intact. Face symmetrical. Hearing grossly intact. Full shoulder shrug bilaterally. Tongue protrudes midline   SENSORY: Sensation is intact to light touch throughout.    MOTOR: Normal bulk and tone.  5/5 deltoid, biceps, triceps, interosseous, hand  bilaterally. 5/5 iliopsoas, knee extension/flexion, foot dorsi/plantarflexion bilaterally.    REFLEXES: Symmetric and 2+ throughout.   CEREBELLAR/COORDINATION/GAIT: Gait steady with normal arm swing and stride length. Finger to nose intact. Normal rapid alternating movements.

## 2020-02-13 ENCOUNTER — PATIENT MESSAGE (OUTPATIENT)
Dept: OBSTETRICS AND GYNECOLOGY | Facility: CLINIC | Age: 40
End: 2020-02-13

## 2020-02-13 ENCOUNTER — TELEPHONE (OUTPATIENT)
Dept: NEUROLOGY | Facility: CLINIC | Age: 40
End: 2020-02-13

## 2020-02-13 RX ORDER — PROMETHAZINE HYDROCHLORIDE 25 MG/1
TABLET ORAL
Qty: 30 TABLET | Refills: 0 | Status: SHIPPED | OUTPATIENT
Start: 2020-02-13 | End: 2020-03-18 | Stop reason: SDUPTHER

## 2020-02-14 ENCOUNTER — HOSPITAL ENCOUNTER (OUTPATIENT)
Dept: RADIOLOGY | Facility: OTHER | Age: 40
Discharge: HOME | End: 2020-02-14
Attending: SPECIALIST
Payer: MEDICARE

## 2020-02-14 DIAGNOSIS — N83.209 CYST OF OVARY, UNSPECIFIED LATERALITY: ICD-10-CM

## 2020-02-14 DIAGNOSIS — N83.209 CYST OF OVARY, UNSPECIFIED LATERALITY: Primary | ICD-10-CM

## 2020-02-14 PROCEDURE — 76856 US PELVIS COMP WITH TRANSVAG NON-OB (XPD): ICD-10-PCS | Mod: 26,,, | Performed by: RADIOLOGY

## 2020-02-14 PROCEDURE — 76830 TRANSVAGINAL US NON-OB: CPT | Mod: TC

## 2020-02-14 PROCEDURE — 76830 US PELVIS COMP WITH TRANSVAG NON-OB (XPD): ICD-10-PCS | Mod: 26,,, | Performed by: RADIOLOGY

## 2020-02-14 PROCEDURE — 76856 US EXAM PELVIC COMPLETE: CPT | Mod: 26,,, | Performed by: RADIOLOGY

## 2020-02-14 PROCEDURE — 76830 TRANSVAGINAL US NON-OB: CPT | Mod: 26,,, | Performed by: RADIOLOGY

## 2020-02-16 ENCOUNTER — PATIENT MESSAGE (OUTPATIENT)
Dept: OBSTETRICS AND GYNECOLOGY | Facility: CLINIC | Age: 40
End: 2020-02-16

## 2020-02-17 ENCOUNTER — PATIENT MESSAGE (OUTPATIENT)
Dept: INTERNAL MEDICINE | Facility: CLINIC | Age: 40
End: 2020-02-17

## 2020-02-18 ENCOUNTER — PATIENT MESSAGE (OUTPATIENT)
Dept: INTERNAL MEDICINE | Facility: CLINIC | Age: 40
End: 2020-02-18

## 2020-02-18 ENCOUNTER — HOSPITAL ENCOUNTER (OUTPATIENT)
Dept: RADIOLOGY | Facility: CLINIC | Age: 40
Discharge: HOME | End: 2020-02-18
Attending: INTERNAL MEDICINE
Payer: MEDICARE

## 2020-02-18 DIAGNOSIS — K50.819 CROHN'S DISEASE OF BOTH SMALL AND LARGE INTESTINE WITH COMPLICATION: ICD-10-CM

## 2020-02-18 DIAGNOSIS — Z79.899 ENCOUNTER FOR LONG-TERM (CURRENT) USE OF HIGH-RISK MEDICATION: ICD-10-CM

## 2020-02-18 DIAGNOSIS — E89.40 PREMATURE SURGICAL MENOPAUSE: ICD-10-CM

## 2020-02-18 PROCEDURE — 77080 DXA BONE DENSITY AXIAL: CPT | Mod: TC

## 2020-02-18 PROCEDURE — 77080 DEXA BONE DENSITY SPINE HIP: ICD-10-PCS | Mod: 26,,, | Performed by: INTERNAL MEDICINE

## 2020-02-18 PROCEDURE — 77080 DXA BONE DENSITY AXIAL: CPT | Mod: 26,,, | Performed by: INTERNAL MEDICINE

## 2020-02-18 RX ORDER — DRONABINOL 5 MG/1
CAPSULE ORAL
Qty: 60 CAPSULE | Refills: 0 | Status: SHIPPED | OUTPATIENT
Start: 2020-02-18 | End: 2020-03-18 | Stop reason: SDUPTHER

## 2020-02-21 ENCOUNTER — LAB VISIT (OUTPATIENT)
Dept: LAB | Facility: HOSPITAL | Age: 40
End: 2020-02-21
Attending: INTERNAL MEDICINE
Payer: MEDICARE

## 2020-02-21 DIAGNOSIS — R19.7 ACUTE DIARRHEA: ICD-10-CM

## 2020-02-21 LAB
C DIFF GDH STL QL: NEGATIVE
C DIFF TOX A+B STL QL IA: NEGATIVE

## 2020-02-21 PROCEDURE — 87045 FECES CULTURE AEROBIC BACT: CPT

## 2020-02-21 PROCEDURE — 87324 CLOSTRIDIUM AG IA: CPT

## 2020-02-21 PROCEDURE — 87449 NOS EACH ORGANISM AG IA: CPT

## 2020-02-21 PROCEDURE — 87046 STOOL CULTR AEROBIC BACT EA: CPT

## 2020-02-21 PROCEDURE — 87427 SHIGA-LIKE TOXIN AG IA: CPT | Mod: 59

## 2020-02-22 ENCOUNTER — PATIENT MESSAGE (OUTPATIENT)
Dept: GASTROENTEROLOGY | Facility: CLINIC | Age: 40
End: 2020-02-22

## 2020-02-24 ENCOUNTER — PATIENT MESSAGE (OUTPATIENT)
Dept: GASTROENTEROLOGY | Facility: CLINIC | Age: 40
End: 2020-02-24

## 2020-02-24 ENCOUNTER — PATIENT MESSAGE (OUTPATIENT)
Dept: INTERNAL MEDICINE | Facility: CLINIC | Age: 40
End: 2020-02-24

## 2020-02-24 ENCOUNTER — TELEPHONE (OUTPATIENT)
Dept: NEUROLOGY | Facility: CLINIC | Age: 40
End: 2020-02-24

## 2020-02-24 DIAGNOSIS — F41.1 GENERALIZED ANXIETY DISORDER: ICD-10-CM

## 2020-02-24 LAB
BACTERIA STL CULT: NORMAL
E COLI SXT1 STL QL IA: NEGATIVE
E COLI SXT2 STL QL IA: NEGATIVE

## 2020-02-24 RX ORDER — CLONAZEPAM 1 MG/1
1 TABLET ORAL 2 TIMES DAILY
Qty: 60 TABLET | Refills: 0 | Status: SHIPPED | OUTPATIENT
Start: 2020-02-24 | End: 2020-03-30

## 2020-02-26 DIAGNOSIS — K50.819 CROHN'S DISEASE OF SMALL AND LARGE INTESTINES WITH COMPLICATION: ICD-10-CM

## 2020-02-27 ENCOUNTER — PATIENT MESSAGE (OUTPATIENT)
Dept: ENDOCRINOLOGY | Facility: CLINIC | Age: 40
End: 2020-02-27

## 2020-02-27 RX ORDER — DIPHENOXYLATE HYDROCHLORIDE AND ATROPINE SULFATE 2.5; .025 MG/1; MG/1
TABLET ORAL
Qty: 100 TABLET | Refills: 0 | Status: SHIPPED | OUTPATIENT
Start: 2020-02-27 | End: 2020-03-24

## 2020-03-02 NOTE — TELEPHONE ENCOUNTER
Add 89740 Cpt? (Important Note: In 2017 The Use Of 17354 Is Being Tracked By Cms To Determine Future Global Period Reimbursement For Global Periods): no See note below.   Detail Level: Detailed

## 2020-03-10 ENCOUNTER — TELEPHONE (OUTPATIENT)
Dept: NEUROLOGY | Facility: CLINIC | Age: 40
End: 2020-03-10

## 2020-03-11 ENCOUNTER — HOSPITAL ENCOUNTER (INPATIENT)
Facility: HOSPITAL | Age: 40
LOS: 3 days | Discharge: HOME | DRG: 101 | End: 2020-03-14
Attending: PSYCHIATRY & NEUROLOGY | Admitting: PSYCHIATRY & NEUROLOGY
Payer: MEDICARE

## 2020-03-11 DIAGNOSIS — G40.909 EPILEPSY: ICD-10-CM

## 2020-03-11 DIAGNOSIS — R56.9 SEIZURES: Primary | ICD-10-CM

## 2020-03-11 DIAGNOSIS — G40.219 COMPLEX PARTIAL EPILEPSY WITH GENERALIZATION AND WITH INTRACTABLE EPILEPSY: ICD-10-CM

## 2020-03-11 DIAGNOSIS — R29.818 TRANSIENT NEUROLOGICAL SYMPTOMS: ICD-10-CM

## 2020-03-11 PROBLEM — Z87.440 HISTORY OF RECURRENT UTI (URINARY TRACT INFECTION): Status: ACTIVE | Noted: 2020-03-11

## 2020-03-11 LAB
ALBUMIN SERPL BCP-MCNC: 3.6 G/DL (ref 3.5–5.2)
ALP SERPL-CCNC: 165 U/L (ref 55–135)
ALT SERPL W/O P-5'-P-CCNC: 43 U/L (ref 10–44)
AMPHET+METHAMPHET UR QL: NEGATIVE
ANION GAP SERPL CALC-SCNC: 10 MMOL/L (ref 8–16)
AST SERPL-CCNC: 29 U/L (ref 10–40)
BARBITURATES UR QL SCN>200 NG/ML: NEGATIVE
BASOPHILS # BLD AUTO: 0.03 K/UL (ref 0–0.2)
BASOPHILS NFR BLD: 0.5 % (ref 0–1.9)
BENZODIAZ UR QL SCN>200 NG/ML: NORMAL
BILIRUB SERPL-MCNC: 0.2 MG/DL (ref 0.1–1)
BILIRUB UR QL STRIP: NEGATIVE
BUN SERPL-MCNC: 16 MG/DL (ref 6–20)
BZE UR QL SCN: NEGATIVE
CALCIUM SERPL-MCNC: 9.2 MG/DL (ref 8.7–10.5)
CANNABINOIDS UR QL SCN: NORMAL
CHLORIDE SERPL-SCNC: 108 MMOL/L (ref 95–110)
CLARITY UR REFRACT.AUTO: ABNORMAL
CO2 SERPL-SCNC: 23 MMOL/L (ref 23–29)
COLOR UR AUTO: YELLOW
CREAT SERPL-MCNC: 0.8 MG/DL (ref 0.5–1.4)
CREAT UR-MCNC: 96 MG/DL (ref 15–325)
DIFFERENTIAL METHOD: ABNORMAL
EOSINOPHIL # BLD AUTO: 0.1 K/UL (ref 0–0.5)
EOSINOPHIL NFR BLD: 0.8 % (ref 0–8)
ERYTHROCYTE [DISTWIDTH] IN BLOOD BY AUTOMATED COUNT: 13.2 % (ref 11.5–14.5)
EST. GFR  (AFRICAN AMERICAN): >60 ML/MIN/1.73 M^2
EST. GFR  (NON AFRICAN AMERICAN): >60 ML/MIN/1.73 M^2
ETHANOL UR-MCNC: <10 MG/DL
GLUCOSE SERPL-MCNC: 92 MG/DL (ref 70–110)
GLUCOSE UR QL STRIP: NEGATIVE
HCT VFR BLD AUTO: 41.5 % (ref 37–48.5)
HGB BLD-MCNC: 12.9 G/DL (ref 12–16)
HGB UR QL STRIP: NEGATIVE
IMM GRANULOCYTES # BLD AUTO: 0.01 K/UL (ref 0–0.04)
IMM GRANULOCYTES NFR BLD AUTO: 0.2 % (ref 0–0.5)
KETONES UR QL STRIP: NEGATIVE
LEUKOCYTE ESTERASE UR QL STRIP: NEGATIVE
LYMPHOCYTES # BLD AUTO: 2.3 K/UL (ref 1–4.8)
LYMPHOCYTES NFR BLD: 36.4 % (ref 18–48)
MCH RBC QN AUTO: 28.6 PG (ref 27–31)
MCHC RBC AUTO-ENTMCNC: 31.1 G/DL (ref 32–36)
MCV RBC AUTO: 92 FL (ref 82–98)
METHADONE UR QL SCN>300 NG/ML: NEGATIVE
MONOCYTES # BLD AUTO: 0.5 K/UL (ref 0.3–1)
MONOCYTES NFR BLD: 8.6 % (ref 4–15)
NEUTROPHILS # BLD AUTO: 3.4 K/UL (ref 1.8–7.7)
NEUTROPHILS NFR BLD: 53.5 % (ref 38–73)
NITRITE UR QL STRIP: NEGATIVE
NRBC BLD-RTO: 0 /100 WBC
OPIATES UR QL SCN: NEGATIVE
PCP UR QL SCN>25 NG/ML: NEGATIVE
PH UR STRIP: 6 [PH] (ref 5–8)
PLATELET # BLD AUTO: 290 K/UL (ref 150–350)
PMV BLD AUTO: 9.7 FL (ref 9.2–12.9)
POTASSIUM SERPL-SCNC: 4.3 MMOL/L (ref 3.5–5.1)
PROT SERPL-MCNC: 7.8 G/DL (ref 6–8.4)
PROT UR QL STRIP: NEGATIVE
RBC # BLD AUTO: 4.51 M/UL (ref 4–5.4)
SODIUM SERPL-SCNC: 141 MMOL/L (ref 136–145)
SP GR UR STRIP: 1.02 (ref 1–1.03)
TOXICOLOGY INFORMATION: NORMAL
URN SPEC COLLECT METH UR: ABNORMAL
WBC # BLD AUTO: 6.27 K/UL (ref 3.9–12.7)

## 2020-03-11 PROCEDURE — 95720 EEG PHY/QHP EA INCR W/VEEG: CPT | Mod: ,,, | Performed by: PSYCHIATRY & NEUROLOGY

## 2020-03-11 PROCEDURE — 93010 EKG 12-LEAD: ICD-10-PCS | Mod: ,,, | Performed by: INTERNAL MEDICINE

## 2020-03-11 PROCEDURE — 25000003 PHARM REV CODE 250: Performed by: STUDENT IN AN ORGANIZED HEALTH CARE EDUCATION/TRAINING PROGRAM

## 2020-03-11 PROCEDURE — 95700 EEG CONT REC W/VID EEG TECH: CPT

## 2020-03-11 PROCEDURE — 11000001 HC ACUTE MED/SURG PRIVATE ROOM

## 2020-03-11 PROCEDURE — 94761 N-INVAS EAR/PLS OXIMETRY MLT: CPT

## 2020-03-11 PROCEDURE — 80307 DRUG TEST PRSMV CHEM ANLYZR: CPT

## 2020-03-11 PROCEDURE — 87040 BLOOD CULTURE FOR BACTERIA: CPT

## 2020-03-11 PROCEDURE — 81003 URINALYSIS AUTO W/O SCOPE: CPT

## 2020-03-11 PROCEDURE — 80053 COMPREHEN METABOLIC PANEL: CPT

## 2020-03-11 PROCEDURE — 93010 ELECTROCARDIOGRAM REPORT: CPT | Mod: ,,, | Performed by: INTERNAL MEDICINE

## 2020-03-11 PROCEDURE — 95720 PR EEG, W/VIDEO, CONT RECORD, I&R, >12<26 HRS: ICD-10-PCS | Mod: ,,, | Performed by: PSYCHIATRY & NEUROLOGY

## 2020-03-11 PROCEDURE — 36415 COLL VENOUS BLD VENIPUNCTURE: CPT

## 2020-03-11 PROCEDURE — 95714 VEEG EA 12-26 HR UNMNTR: CPT

## 2020-03-11 PROCEDURE — 99222 1ST HOSP IP/OBS MODERATE 55: CPT | Mod: ,,, | Performed by: SURGERY

## 2020-03-11 PROCEDURE — 63600175 PHARM REV CODE 636 W HCPCS: Performed by: STUDENT IN AN ORGANIZED HEALTH CARE EDUCATION/TRAINING PROGRAM

## 2020-03-11 PROCEDURE — 93005 ELECTROCARDIOGRAM TRACING: CPT

## 2020-03-11 PROCEDURE — 99223 1ST HOSP IP/OBS HIGH 75: CPT | Mod: AI,GC,, | Performed by: PSYCHIATRY & NEUROLOGY

## 2020-03-11 PROCEDURE — 85025 COMPLETE CBC W/AUTO DIFF WBC: CPT

## 2020-03-11 PROCEDURE — 99223 PR INITIAL HOSPITAL CARE,LEVL III: ICD-10-PCS | Mod: AI,GC,, | Performed by: PSYCHIATRY & NEUROLOGY

## 2020-03-11 PROCEDURE — 99222 PR INITIAL HOSPITAL CARE,LEVL II: ICD-10-PCS | Mod: ,,, | Performed by: SURGERY

## 2020-03-11 RX ORDER — LOPERAMIDE HYDROCHLORIDE 2 MG/1
2 CAPSULE ORAL
Status: DISCONTINUED | OUTPATIENT
Start: 2020-03-11 | End: 2020-03-14 | Stop reason: HOSPADM

## 2020-03-11 RX ORDER — GABAPENTIN 300 MG/1
300 CAPSULE ORAL 2 TIMES DAILY
Status: DISCONTINUED | OUTPATIENT
Start: 2020-03-11 | End: 2020-03-14 | Stop reason: HOSPADM

## 2020-03-11 RX ORDER — CEPHALEXIN 250 MG/1
250 CAPSULE ORAL DAILY
Status: DISCONTINUED | OUTPATIENT
Start: 2020-03-11 | End: 2020-03-11

## 2020-03-11 RX ORDER — CEPHALEXIN 250 MG/1
250 CAPSULE ORAL NIGHTLY
Status: DISCONTINUED | OUTPATIENT
Start: 2020-03-12 | End: 2020-03-14 | Stop reason: HOSPADM

## 2020-03-11 RX ORDER — ACETAMINOPHEN 325 MG/1
650 TABLET ORAL EVERY 4 HOURS PRN
Status: DISCONTINUED | OUTPATIENT
Start: 2020-03-11 | End: 2020-03-14 | Stop reason: HOSPADM

## 2020-03-11 RX ORDER — PROMETHAZINE HYDROCHLORIDE 12.5 MG/1
25 TABLET ORAL EVERY 6 HOURS PRN
Status: DISCONTINUED | OUTPATIENT
Start: 2020-03-11 | End: 2020-03-14 | Stop reason: HOSPADM

## 2020-03-11 RX ORDER — VALACYCLOVIR HYDROCHLORIDE 500 MG/1
500 TABLET, FILM COATED ORAL DAILY
Status: DISCONTINUED | OUTPATIENT
Start: 2020-03-11 | End: 2020-03-14 | Stop reason: HOSPADM

## 2020-03-11 RX ORDER — DOCUSATE SODIUM 100 MG/1
100 CAPSULE, LIQUID FILLED ORAL 2 TIMES DAILY PRN
Status: DISCONTINUED | OUTPATIENT
Start: 2020-03-11 | End: 2020-03-14 | Stop reason: HOSPADM

## 2020-03-11 RX ORDER — LOPERAMIDE HYDROCHLORIDE 2 MG/1
2 CAPSULE ORAL EVERY 4 HOURS PRN
Status: DISCONTINUED | OUTPATIENT
Start: 2020-03-11 | End: 2020-03-11

## 2020-03-11 RX ORDER — MIRTAZAPINE 15 MG/1
30 TABLET, ORALLY DISINTEGRATING ORAL NIGHTLY
Status: DISCONTINUED | OUTPATIENT
Start: 2020-03-11 | End: 2020-03-14 | Stop reason: HOSPADM

## 2020-03-11 RX ORDER — DRONABINOL 2.5 MG/1
5 CAPSULE ORAL
Status: DISCONTINUED | OUTPATIENT
Start: 2020-03-11 | End: 2020-03-14 | Stop reason: HOSPADM

## 2020-03-11 RX ORDER — SODIUM CHLORIDE 0.9 % (FLUSH) 0.9 %
10 SYRINGE (ML) INJECTION
Status: DISCONTINUED | OUTPATIENT
Start: 2020-03-11 | End: 2020-03-14 | Stop reason: HOSPADM

## 2020-03-11 RX ADMIN — MIRTAZAPINE 30 MG: 15 TABLET, ORALLY DISINTEGRATING ORAL at 08:03

## 2020-03-11 RX ADMIN — GABAPENTIN 300 MG: 300 CAPSULE ORAL at 12:03

## 2020-03-11 RX ADMIN — DRONABINOL 5 MG: 2.5 CAPSULE ORAL at 04:03

## 2020-03-11 RX ADMIN — CEPHALEXIN 250 MG: 250 CAPSULE ORAL at 12:03

## 2020-03-11 RX ADMIN — VALACYCLOVIR HYDROCHLORIDE 500 MG: 500 TABLET, FILM COATED ORAL at 12:03

## 2020-03-11 RX ADMIN — GABAPENTIN 300 MG: 300 CAPSULE ORAL at 08:03

## 2020-03-11 NOTE — ASSESSMENT & PLAN NOTE
cholostomy in place  Continue home imodium  Lomotil prescribed previously, but pt reports not taking on a regular basis.

## 2020-03-11 NOTE — ASSESSMENT & PLAN NOTE
Patient is a 40yo female with flushing and seizure-like activity, who has a port in place for bi-weekly IV infusions. Surgery consulted to rule out port infection as a cause for her flushing, possible septic emboli?    - There is no clinical evidence of a port pocket infection  - Patient is afebrile with no leukocytosis. Would not recommend removal of port as it is still functional and is very unlikely the cause of her symptoms.   - If the patient has positive blood cultures, then may call surgery back about consideration port removal. However, often able to prophylactically treat blood stream infections without port removal as long it is not complicated (cellulitis, pus in pocket, etc)

## 2020-03-11 NOTE — SUBJECTIVE & OBJECTIVE
No current facility-administered medications on file prior to encounter.      Current Outpatient Medications on File Prior to Encounter   Medication Sig    cephALEXin (KEFLEX) 250 MG capsule TAKE 1 CAPSULE(250 MG) BY MOUTH EVERY DAY    famotidine (PEPCID) 20 MG tablet Take 20 mg by mouth 2 (two) times daily.    gabapentin (NEURONTIN) 300 MG capsule Take 1 capsule (300 mg total) by mouth once daily. After two weeks, take one twice daily    loperamide (IMODIUM) 2 mg capsule Take 2 mg by mouth daily as needed for Diarrhea.    mirtazapine (REMERON SOL-TAB) 30 MG disintegrating tablet Take 1 tablet (30 mg total) by mouth nightly.    multivitamin (ONE DAILY MULTIVITAMIN) per tablet Take 1 tablet by mouth once daily.    potassium citrate (UROCIT-K 15) 15 mEq TbSR Take 2 tablets by mouth 2 (two) times daily.    promethazine (PHENERGAN) 25 MG tablet TAKE 1 TABLET(25 MG) BY MOUTH EVERY 6 HOURS AS NEEDED    valACYclovir (VALTREX) 500 MG tablet TAKE 1 TABLET BY MOUTH EVERY DAY    zolpidem (AMBIEN) 10 mg Tab TAKE 1 TABLET BY MOUTH EVERY NIGHT AT BEDTIME    fluconazole (DIFLUCAN) 100 MG tablet TAKE 1 TABLET BY MOUTH EVERY DAY    food supplemt, lactose-reduced (BOOST BREEZE NUTRITIONAL) 0.04-1.05 gram-kcal/mL Liqd Use 3 times per day (Patient not taking: Reported on 1/31/2020)    vedolizumab (ENTYVIO) 300 mg SolR injection Inject 300 mg into the vein.    [DISCONTINUED] amoxicillin (AMOXIL) 500 MG capsule Take 1 capsule (500 mg total) by mouth every 12 (twelve) hours. (Patient not taking: Reported on 2/12/2020)       Review of patient's allergies indicates:   Allergen Reactions    Azathioprine sodium Other (See Comments)     Other reaction(s): pancreatitis  Other reaction(s): pancreatitis    Methotrexate analogues Other (See Comments)     leukopenia    Stelara [ustekinumab] Other (See Comments)     Multiple infections    Zofran [ondansetron hcl (pf)] Other (See Comments)     Per patient causes prolong QT     Vancomycin analogues Hives    Morphine Itching and Other (See Comments)     Other reaction(s): Itching    Bactrim [sulfamethoxazole-trimethoprim] Palpitations    Ciprofloxacin Palpitations       Past Medical History:   Diagnosis Date    Abnormal Pap smear 2007    Abnormal Pap smear 5/26/2011    Anemia     Anxiety     Arthritis     C. difficile diarrhea     Crohn's disease     Depression 8/5/2017    Encounter for blood transfusion     Genital HSV     History of colposcopy with cervical biopsy 2007 and 7/2011 2007-LYLA I  and 7/2011- LYLA I    Hypertension     Kidney stone     Kidney stone     Melanoma     Recurrent UTI 4/3/2013    S/P ileostomy 7/9/2012    Sterilization 6/23/2012     Past Surgical History:   Procedure Laterality Date    ABDOMINAL SURGERY      APPENDECTOMY      BILATERAL SALPINGO-OOPHORECTOMY (BSO) Bilateral 5/30/2019    Procedure: SALPINGO-OOPHORECTOMY, BILATERAL;  Surgeon: Rupa German MD;  Location: [facility] OR [address] [place] [address];  Service: OB/GYN;  Laterality: Bilateral;    BLADDER SURGERY      partial cystectomy due to fistula    CKC      COLON SURGERY      COLONOSCOPY      EXCISION OF MELANOMA  07/17/2019    ILEOSTOMY      LYSIS OF ADHESIONS N/A 5/30/2019    Procedure: LYSIS, ADHESIONS;  Surgeon: Rupa German MD;  Location: [facility] OR [address] [place] [address];  Service: OB/GYN;  Laterality: N/A;    OOPHORECTOMY Right 04/16/2015    PORTACATH PLACEMENT  02/21/2017    SKIN BIOPSY      SMALL INTESTINE SURGERY      age 18 Y    TOTAL ABDOMINAL HYSTERECTOMY  04/16/2015    TOTAL COLECTOMY      TUBAL LIGATION  06/06/2012    UPPER GASTROINTESTINAL ENDOSCOPY       Family History     Problem Relation (Age of Onset)    Cancer Father, Maternal Grandfather    Colon cancer Father    Crohn's disease Brother    Endometrial cancer Maternal Aunt    Hypertension Mother    Liver cancer Father    Skin cancer Maternal Grandfather        Tobacco Use    Smoking status: Never Smoker    Smokeless tobacco:  Never Used   Substance and Sexual Activity    Alcohol use: Not Currently     Alcohol/week: 0.0 standard drinks    Drug use: No    Sexual activity: Yes     Partners: Male     Birth control/protection: Surgical     Comment: HYST     Review of Systems   Constitutional: Negative for activity change, appetite change, fatigue and fever.   Respiratory: Negative for chest tightness and shortness of breath.    Cardiovascular: Negative for chest pain.   Gastrointestinal: Positive for diarrhea (high ileostomy output). Negative for abdominal distention, constipation, nausea and vomiting.   Genitourinary: Negative for difficulty urinating and dysuria.   Skin: Positive for color change (flushing). Negative for rash and wound.   Neurological: Positive for seizures. Negative for dizziness.     Objective:     Vital Signs (Most Recent):  Temp: 98.4 °F (36.9 °C) (03/11/20 1124)  Pulse: 80 (03/11/20 1124)  Resp: 16 (03/11/20 0928)  BP: 113/80 (03/11/20 1124)  SpO2: 99 % (03/11/20 1124) Vital Signs (24h Range):  Temp:  [98.4 °F (36.9 °C)-98.7 °F (37.1 °C)] 98.4 °F (36.9 °C)  Pulse:  [80-89] 80  Resp:  [16] 16  SpO2:  [99 %] 99 %  BP: (113)/(71-80) 113/80     Weight: 53.5 kg (117 lb 15.1 oz)  Body mass index is 22.29 kg/m².    Physical Exam   Constitutional: She is oriented to person, place, and time. She appears well-developed.   HENT:   Seizure monitoring headband in place   Neck: No JVD present. No tracheal deviation present.   Cardiovascular: Normal rate and regular rhythm.   Pulmonary/Chest: Breath sounds normal. No respiratory distress.   Left chest port-a-cath in place with well-healed incision. No erythema, fluctuance, or tenderness to palpation. Looks very normal.   Abdominal: Soft. She exhibits no distension and no mass. There is no tenderness. There is no rebound and no guarding.   RLQ end ileostomy with good output   Musculoskeletal: She exhibits no edema.   Neurological: She is alert and oriented to person, place, and  time.   Skin: Skin is warm and dry.   flushed       Significant Labs:  CBC:   Recent Labs   Lab 03/11/20  1152   WBC 6.27   RBC 4.51   HGB 12.9   HCT 41.5      MCV 92   MCH 28.6   MCHC 31.1*     CMP:   Recent Labs   Lab 03/11/20  1152   GLU 92   CALCIUM 9.2   ALBUMIN 3.6   PROT 7.8      K 4.3   CO2 23      BUN 16   CREATININE 0.8   ALKPHOS 165*   ALT 43   AST 29   BILITOT 0.2     Microbiology Results (last 7 days)     Procedure Component Value Units Date/Time    Blood culture [[id number]] Collected:  03/11/20 1505    Order Status:  Sent Specimen:  Blood Updated:  03/11/20 1505    Blood culture [[id number]]     Order Status:  Sent Specimen:  Blood           Significant Diagnostics:  I have reviewed all pertinent imaging results/findings within the past 24 hours.

## 2020-03-11 NOTE — H&P
[facility]  Epilepsy Monitoring Unit  History and Physical     Date: 03/11/2020  Patient Name: Ivy Salgado   MRN: [id number]   PCP: Kalia Astorga    Assessment:      This is Ivy Salgado, 39 y.o. female who presents for evaluation of abnormal spells.       Plan:   History of recurrent UTI (urinary tract infection)  Continue home keflex 250 mg Qdaily    Complex partial epilepsy with generalization and with intractable epilepsy  Ms. Ivy Salgado is a 39 y.o. female w/ PMH significant for Crohn's, nephrolithiasis, melanoma, HTN, who presents with a chief complaint of evaluation for epilepsy.  Referred by Dr. Miller.    Based on description of events, differential could include rigors/infection (port? Recurrent UTIs), PNEE, epilepsy, pheochromocytoma (prior metanephrines unrevealing).    Plan  -Admit to EMU, continuous EEG  -Consult gen surgery - ?port infection; appreciate input   -F/u blood, urine cultures, TTE  -CBC, CMP, UDS  -HOLD home clonazepam 1 mg Qdaily, ambien 10 mg QHS PRN  -Continue home gabapentin 300 mg BID  -Ativan 2 mg IV PRN for gen seizure activity >3-5 minutes duration  -Seizure, fall precautions    Transient neurological symptoms  See epilepsy section      Appetite impaired  Continue home marinol     Joint pain  Continue home gabapentin     Herpes genitalis in women  Continue home valtrex     Generalized anxiety disorder  Continue home remeron  HOLD home clonazepam 1 mg Qdaily PRN    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          We discussed in detail the purpose of the inpatient stay in the Epilepsy Monitoring Unit (EMU) and the proposed length of stay needed for this diagnostic study. Our goal is to characterize the patient's  events and reported/observed symptoms and facilitate the establishment of appropriate diagnosis. In order to capture patient events for further  characterization and optimization of treatment, we utilize continuous video and EEG recording. We reviewed the risks and benefits involved in this diagnostic study which include but not limited to the possibility of generalized tonic-clonic seizure(s), uncontrolled seizures, Sudden unexpected death in epilepsy (SUDEP) which is a fatal complication of epilepsy with generalized tonic-clonic seizures, and cardiac complications related to epilepsy. Risks related to Seizure and seizure like events were also discussed including aspiration, tongue biting, self-injury, emotional distress, and cardio-respiratory dysfunction, and emotional distress related to hospital stay and provocation measures. We discussed provocation measures that are typically employed during the EMU study which include tapering home medications, hyperventilation, repetitive photic stimulation, sleep deprivation, and drugs used to lower seizure threshold. We discussed risks associated with these measures including drug withdrawal effects on the mind and body.  The patient voiced understanding of this discussion and all questions were answered to their satisfaction.      Subjective:        HPI:   Ms. Ivy Salgado is a 39 y.o. female w/ PMH significant for Crohn's, nephrolithiasis, melanoma, HTN, who presents with a chief complaint of evaluation for epilepsy.  Referred by Dr. Miller.    She reports onset of events in 2018.  She describes the events as beginning with a flushing sensation for seconds-minutes, followed by generalized shaking (she demonstrates a moderate amplitude, sometimes dysynchronous, varying frequency shaking) ongoing for 5-10 minutes.  This is followed by a period of confusion (lasting minutes) and drowsiness (duration of hours).  She reports events triggered by stress.      She is currently on gabapentin 300 mg BID (pain), clonazepam 1 mg PRN Qdaily (anxiety), and ambien 10 mg QHS PRN (3x/week).      Further, her history has been  complicated by urosepsis, repeated ovarian cysts, port placement.    Denies alcohol, recreational drug use, cigarette use.    Seizure Type: unknown  Seizure Etiology: unknown  Home AEDs: gabapentin (pain), clonazepam (anxiety)  Last AEDs Taken Prior to Admission: gabapentin 300 mg 3/10 PM    The patient is not accompanied by family who contribute to the history. This patient has 1 types of seizure as described below. The patient reports having seizures for years The patient reports to have fluctuating seizure control. The seizure frequency is fluctuating, but may reach 2x/month. The last seizure was 2 weeks prior to EMU presentation . The patient does not report side effects from seizure medication.     Seizure Type 1:   Seizure Description: generalized shaking (see HPI), followed by confusion and drowsiness.  Aura: flushing  Associated Symptoms:  none  Seizure Frequency: 0-2x/month  Last seizure: 2 weeks prior to EMU presentation    Handedness: right  Seizure Onset Age: 37   Seizure/ Epilepsy Risk Factors: none  Birth/Developmental History: normal birth history and Normal developmental history  Seizure Triggers/ Provoking Features: stress  Previous Seizure Medications: gabapentin, clonazepam  Recent Med Changes: None pertinent  Other Treatments: None  Prior Episodes of Status:  None  Psychiatric/Behavioral Comorbitidies: anxiety  Surgical Candidacy: NA    Prior Studies:  EEG : 1/16/2019 - unremarkable  vEEG/ EMU evaluation: 3/2020  MRI of brain: 11/13/19 - scattered T2 hyperintensities  AED levels:  None  CT/CTA Scan:  None  PET Scan: None  Neuropsychological Evaluation: None  DEXA Scan: None  Other studies: None    PAST MEDICAL HISTORY:  Past Medical History:   Diagnosis Date    Abnormal Pap smear 2007    Abnormal Pap smear 5/26/2011    Anemia     Anxiety     Arthritis     C. difficile diarrhea     Crohn's disease     Depression 8/5/2017    Encounter for blood transfusion     Genital HSV     History of  colposcopy with cervical biopsy 2007 and 7/2011 2007-LYLA I  and 7/2011- LYLA I    Hypertension     Kidney stone     Kidney stone     Melanoma     Recurrent UTI 4/3/2013    S/P ileostomy 7/9/2012    Sterilization 6/23/2012       PAST SURGICAL HISTORY:  Past Surgical History:   Procedure Laterality Date    ABDOMINAL SURGERY      APPENDECTOMY      BILATERAL SALPINGO-OOPHORECTOMY (BSO) Bilateral 5/30/2019    Procedure: SALPINGO-OOPHORECTOMY, BILATERAL;  Surgeon: Rupa German MD;  Location: [facility] OR [address] [place] [address];  Service: OB/GYN;  Laterality: Bilateral;    BLADDER SURGERY      partial cystectomy due to fistula    CKC      COLON SURGERY      COLONOSCOPY      EXCISION OF MELANOMA  07/17/2019    ILEOSTOMY      LYSIS OF ADHESIONS N/A 5/30/2019    Procedure: LYSIS, ADHESIONS;  Surgeon: Rupa German MD;  Location: [facility] OR [address] [place] [address];  Service: OB/GYN;  Laterality: N/A;    OOPHORECTOMY Right 04/16/2015    PORTACATH PLACEMENT  02/21/2017    SKIN BIOPSY      SMALL INTESTINE SURGERY      age 18 Y    TOTAL ABDOMINAL HYSTERECTOMY  04/16/2015    TOTAL COLECTOMY      TUBAL LIGATION  06/06/2012    UPPER GASTROINTESTINAL ENDOSCOPY         CURRENT MEDS:  Current Facility-Administered Medications   Medication Dose Route Frequency Provider Last Rate Last Dose    acetaminophen tablet 650 mg  650 mg Oral Q4H PRN James Ross MD        [START ON 3/12/2020] Bi-est/PROG/DHEA cream  1 g Apply Externally Daily James Ross MD        [START ON 3/12/2020] cephALEXin capsule 250 mg  250 mg Oral QHS James Ross MD        docusate sodium capsule 100 mg  100 mg Oral BID PRN James Ross MD        dronabinoL capsule 5 mg  5 mg Oral BID AC James Ross MD        gabapentin capsule 300 mg  300 mg Oral BID James Ross MD   300 mg at 03/11/20 1215    loperamide capsule 2 mg  2 mg Oral Q3H PRN James Ross MD        mirtazapine disintegrating tablet 30 mg  30 mg Oral Nightly James  Vincenzo Ross MD        promethazine tablet 25 mg  25 mg Oral Q6H PRN James Ross MD        sodium chloride 0.9% flush 10 mL  10 mL Intravenous PRN James Ross MD        [START ON 3/12/2020] Testosterone gel  1 g Apply Externally Daily James Ross MD        valACYclovir tablet 500 mg  500 mg Oral Daily James Ross MD   500 mg at 03/11/20 1215       ALLERGIES:  Review of patient's allergies indicates:   Allergen Reactions    Azathioprine sodium Other (See Comments)     Other reaction(s): pancreatitis  Other reaction(s): pancreatitis    Methotrexate analogues Other (See Comments)     leukopenia    Stelara [ustekinumab] Other (See Comments)     Multiple infections    Zofran [ondansetron hcl (pf)] Other (See Comments)     Per patient causes prolong QT    Vancomycin analogues Hives    Morphine Itching and Other (See Comments)     Other reaction(s): Itching    Bactrim [sulfamethoxazole-trimethoprim] Palpitations    Ciprofloxacin Palpitations       FAMILY HISTORY:  Family History   Problem Relation Age of Onset    Colon cancer Father     Cancer Father         colon cancer    Liver cancer Father     Hypertension Mother     Cancer Maternal Grandfather         esophageal      Skin cancer Maternal Grandfather     Endometrial cancer Maternal Aunt     Crohn's disease Brother     Breast cancer Neg Hx     Ovarian cancer Neg Hx     Melanoma Neg Hx        SOCIAL HISTORY:  Social History     Tobacco Use    Smoking status: Never Smoker    Smokeless tobacco: Never Used   Substance Use Topics    Alcohol use: Not Currently     Alcohol/week: 0.0 standard drinks    Drug use: No       Review of Systems:  12 system review of systems is negative except as noted in HPI.        Objective:     Vitals:    03/11/20 0921 03/11/20 0928 03/11/20 1047 03/11/20 1124   BP: 113/71 113/71  113/80   BP Location: Right arm Right arm  Right arm   Patient Position: Lying Lying  Lying   Pulse: 86 86 89 80  "  Resp: 16 16     Temp: 98.7 °F (37.1 °C) 98.7 °F (37.1 °C)  98.4 °F (36.9 °C)   TempSrc:    Oral   SpO2: 99% 99%  99%   Weight: 53.5 kg (117 lb 15.1 oz) 53.5 kg (117 lb 15.1 oz)     Height: 5' 1" (1.549 m) 5' 1" (1.549 m)         General: NAD, well nourished   Eyes: no tearing, discharge, no erythema   ENT: moist mucous membranes of the oral cavity, nares patent    Neck: Supple, Full range of motion  Cardiovascular: Warm and well perfused, pulses equal and symmetrical  Lungs: Normal work of breathing, normal chest wall excursions  Skin: No rash, lesions, or breakdown on exposed skin.  Port site clean/dry, no erythema nor local fever.  Psychiatry: Mood and affect are appropriate   Abdomen: soft, non tender, non distended  Extremeties: No cyanosis, clubbing or edema.    Neurological   MENTAL STATUS: Alert and oriented to person, place, and time. Attention and concentration within normal limits. Speech without dysarthria, able to name and repeat without difficulty. Recent and remote memory within normal limits   CRANIAL NERVES: Visual fields intact. PERRL. EOMI. Facial sensation intact. Face symmetrical. Hearing grossly intact. Full shoulder shrug bilaterally. Tongue protrudes midline   SENSORY: Sensation is intact to light touch throughout.   MOTOR: Normal bulk and tone. No pronator drift.  5/5 deltoid, biceps, triceps, interosseous, hand  bilaterally. 5/5 iliopsoas, knee extension/flexion, foot dorsi/plantarflexion bilaterally.    REFLEXES: Symmetric and 2+ throughout. No clonus b/l.  CEREBELLAR/COORDINATION/GAIT: finger/nose intact b/l.  Gait deferred 2/2 seizure precautions.    Labs  No recent pertinent labs     Ref. Range 9/4/2018 06:00 11/27/2018 14:45   Metanephrine, Free Latest Ref Range: < OR = 57 pg/mL 48 55   Normetanephrine, Free Latest Ref Range: < OR = 148 pg/mL 42 160 (H)   Metanephrine, Total, Plasma Latest Ref Range: < OR = 205 pg/mL 90 215 (H)       Imaging  1/13/2019 MRI Brain epilepsy w/ and " "w/o: Per independent resident review and interpretation,  No acute infarct, hemorrhage, nor mass effect. A few scattered punctate T2 hyperintensities noted.                  1/16/2019 EEG: Per Dr. Luong's report,  "IMPRESSION:  This is a normal EEG during wakefulness, drowsiness and sleep.     CLINICAL CORRELATION:  The patient is a 38-year-old female who is currently   maintained on clonazepam.  This is a normal EEG during wakefulness, drowsiness   and sleep.  There is no evidence for either cortical dysfunction nor an   epileptic process on this recording.  No seizures were recorded during this   study.  The excessive beta range activity is likely secondary to use of   Benzodiazepine."        "

## 2020-03-11 NOTE — NURSING
Pt arrived to NPU aaox4 and able to make needs known.vssaf no c/o pain.Plan of care reviewed w/ pt.Pt verbalizes understanding.Pt oriented to room.EEG monitoring in place.Pt NSR on telemetry.20G placed in left FA saline locked.Safety measures continued.Call bell placed within reach.Bed in lowest position.wctm

## 2020-03-11 NOTE — ASSESSMENT & PLAN NOTE
Ms. Ivy Salgado is a 39 y.o. female w/ PMH significant for Crohn's, nephrolithiasis, melanoma, HTN, who presents with a chief complaint of evaluation for epilepsy.  Referred by Dr. Miller.    Based on description of events, differential could include rigors/infection (port -> biofilm? Recurrent UTIs), PNEE, epilepsy, pheochromocytoma, carcinoid syndrome, disulfiram like reaction, POTS.    She reports that drinking a beer reliably triggers her events.  The first event captured on 3/12 following HV did not have an epileptic correlate.  We will evaluate further with a typical trigger for her (beer) and check labs for a pheochromocytoma, including 24 hr urine collection.     We discussed the significant stresses she faces, and she is interested in seeing a psychologist after discharge.      Plan  -Continuous EEG  -urine/serum metanephrines and catecholamines, chromogranin A  -F/u blood, urine cultures to evaluate for infectious etiology  -HOLD home clonazepam 1 mg Qdaily, ambien 10 mg QHS PRN  -Continue home gabapentin 300 mg BID  -HV/PS Qdaily  -Ativan 2 mg IV PRN for gen seizure activity >3-5 minutes duration  -Seizure, fall precautions    Anticipate discharge 3/14 after urine studies finish collection.  Outpatient referral to psychology (discuss stressors/mood) and cardiology (to evaluate for POTS or other etiology).

## 2020-03-11 NOTE — HPI
"Patient is a 40yo female that presented as a scheduled admission for workup of flushing vs seizure like activity. She has a complicated medical history including Crohns (s/p total abdominal colectomy with end ileostomy, Dr. Parsons), Melanoma (s/p excision, Dr. Olvera), recurrent UTIs and urosepsis (on chronic Keflex for history of ESBL in her urine). She had a left-sided port-a-cath, placed 3 years ago by Dr. Sanchez for Entivio infusions. She uses the port every 2-3 weeks for outpatient crystalloid infusions due to ongoing fluid losses from her ileostomy. Has been off Entivio for one year, not on other immunosuppression. In regards to her flushing, she has been extensively evaluted in the past for carcinoid syndrome and pheochromocytoma, all workup negative.     General surgery was consulted to evaluate the port for possible infection. Reason being for possible "septic emboli" causing intraabdominal issues or sepsis. Patient has been afebrile with normal white count. Blood and urine cultures still to be drawn.  "

## 2020-03-11 NOTE — CONSULTS
"[facility]  General Surgery  Consult Note    Patient Name: Ivy Salgado  MRN: [id number]  Code Status: Full Code  Admission Date: 3/11/2020  Hospital Length of Stay: 0 days  Attending Physician: Arielle Bates MD PhD  Primary Care Provider: Kalia Astorga MD    Patient information was obtained from patient and past medical records.     Inpatient consult to General Surgery  Consult performed by: Quiana Vega MD  Consult ordered by: James Ross MD        Subjective:     Principal Problem: <principal problem not specified>    History of Present Illness: Patient is a 40yo female that presented as a scheduled admission for workup of flushing vs seizure like activity. She has a complicated medical history including Crohns (s/p total abdominal colectomy with end ileostomy, Dr. Parsons), Melanoma (s/p excision, Dr. Olvera), recurrent UTIs and urosepsis (on chronic Keflex for history of ESBL in her urine). She had a left-sided port-a-cath, placed 3 years ago by Dr. Sanchez for Entivio infusions. She uses the port every 2-3 weeks for outpatient crystalloid infusions due to ongoing fluid losses from her ileostomy. Has been off Entivio for one year, not on other immunosuppression. In regards to her flushing, she has been extensively evaluted in the past for carcinoid syndrome and pheochromocytoma, all workup negative.     General surgery was consulted to evaluate the port for possible infection. Reason being for possible "septic emboli" causing intraabdominal issues or sepsis. Patient has been afebrile with normal white count. Blood and urine cultures still to be drawn.    No current facility-administered medications on file prior to encounter.      Current Outpatient Medications on File Prior to Encounter   Medication Sig    cephALEXin (KEFLEX) 250 MG capsule TAKE 1 CAPSULE(250 MG) BY MOUTH EVERY DAY    famotidine (PEPCID) 20 MG tablet Take 20 mg by mouth 2 (two) times " daily.    gabapentin (NEURONTIN) 300 MG capsule Take 1 capsule (300 mg total) by mouth once daily. After two weeks, take one twice daily    loperamide (IMODIUM) 2 mg capsule Take 2 mg by mouth daily as needed for Diarrhea.    mirtazapine (REMERON SOL-TAB) 30 MG disintegrating tablet Take 1 tablet (30 mg total) by mouth nightly.    multivitamin (ONE DAILY MULTIVITAMIN) per tablet Take 1 tablet by mouth once daily.    potassium citrate (UROCIT-K 15) 15 mEq TbSR Take 2 tablets by mouth 2 (two) times daily.    promethazine (PHENERGAN) 25 MG tablet TAKE 1 TABLET(25 MG) BY MOUTH EVERY 6 HOURS AS NEEDED    valACYclovir (VALTREX) 500 MG tablet TAKE 1 TABLET BY MOUTH EVERY DAY    zolpidem (AMBIEN) 10 mg Tab TAKE 1 TABLET BY MOUTH EVERY NIGHT AT BEDTIME    fluconazole (DIFLUCAN) 100 MG tablet TAKE 1 TABLET BY MOUTH EVERY DAY    food supplemt, lactose-reduced (BOOST BREEZE NUTRITIONAL) 0.04-1.05 gram-kcal/mL Liqd Use 3 times per day (Patient not taking: Reported on 1/31/2020)    vedolizumab (ENTYVIO) 300 mg SolR injection Inject 300 mg into the vein.    [DISCONTINUED] amoxicillin (AMOXIL) 500 MG capsule Take 1 capsule (500 mg total) by mouth every 12 (twelve) hours. (Patient not taking: Reported on 2/12/2020)       Review of patient's allergies indicates:   Allergen Reactions    Azathioprine sodium Other (See Comments)     Other reaction(s): pancreatitis  Other reaction(s): pancreatitis    Methotrexate analogues Other (See Comments)     leukopenia    Stelara [ustekinumab] Other (See Comments)     Multiple infections    Zofran [ondansetron hcl (pf)] Other (See Comments)     Per patient causes prolong QT    Vancomycin analogues Hives    Morphine Itching and Other (See Comments)     Other reaction(s): Itching    Bactrim [sulfamethoxazole-trimethoprim] Palpitations    Ciprofloxacin Palpitations       Past Medical History:   Diagnosis Date    Abnormal Pap smear 2007    Abnormal Pap smear 5/26/2011    Anemia      Anxiety     Arthritis     C. difficile diarrhea     Crohn's disease     Depression 8/5/2017    Encounter for blood transfusion     Genital HSV     History of colposcopy with cervical biopsy 2007 and 7/2011 2007-LYLA I  and 7/2011- LYLA I    Hypertension     Kidney stone     Kidney stone     Melanoma     Recurrent UTI 4/3/2013    S/P ileostomy 7/9/2012    Sterilization 6/23/2012     Past Surgical History:   Procedure Laterality Date    ABDOMINAL SURGERY      APPENDECTOMY      BILATERAL SALPINGO-OOPHORECTOMY (BSO) Bilateral 5/30/2019    Procedure: SALPINGO-OOPHORECTOMY, BILATERAL;  Surgeon: Rupa German MD;  Location: [facility] OR [address] [place] [address];  Service: OB/GYN;  Laterality: Bilateral;    BLADDER SURGERY      partial cystectomy due to fistula    CKC      COLON SURGERY      COLONOSCOPY      EXCISION OF MELANOMA  07/17/2019    ILEOSTOMY      LYSIS OF ADHESIONS N/A 5/30/2019    Procedure: LYSIS, ADHESIONS;  Surgeon: Rupa German MD;  Location: [facility] OR [address] [place] [address];  Service: OB/GYN;  Laterality: N/A;    OOPHORECTOMY Right 04/16/2015    PORTACATH PLACEMENT  02/21/2017    SKIN BIOPSY      SMALL INTESTINE SURGERY      age 18 Y    TOTAL ABDOMINAL HYSTERECTOMY  04/16/2015    TOTAL COLECTOMY      TUBAL LIGATION  06/06/2012    UPPER GASTROINTESTINAL ENDOSCOPY       Family History     Problem Relation (Age of Onset)    Cancer Father, Maternal Grandfather    Colon cancer Father    Crohn's disease Brother    Endometrial cancer Maternal Aunt    Hypertension Mother    Liver cancer Father    Skin cancer Maternal Grandfather        Tobacco Use    Smoking status: Never Smoker    Smokeless tobacco: Never Used   Substance and Sexual Activity    Alcohol use: Not Currently     Alcohol/week: 0.0 standard drinks    Drug use: No    Sexual activity: Yes     Partners: Male     Birth control/protection: Surgical     Comment: HYST     Review of Systems   Constitutional: Negative for activity change, appetite  change, fatigue and fever.   Respiratory: Negative for chest tightness and shortness of breath.    Cardiovascular: Negative for chest pain.   Gastrointestinal: Positive for diarrhea (high ileostomy output). Negative for abdominal distention, constipation, nausea and vomiting.   Genitourinary: Negative for difficulty urinating and dysuria.   Skin: Positive for color change (flushing). Negative for rash and wound.   Neurological: Positive for seizures. Negative for dizziness.     Objective:     Vital Signs (Most Recent):  Temp: 98.4 °F (36.9 °C) (03/11/20 1124)  Pulse: 80 (03/11/20 1124)  Resp: 16 (03/11/20 0928)  BP: 113/80 (03/11/20 1124)  SpO2: 99 % (03/11/20 1124) Vital Signs (24h Range):  Temp:  [98.4 °F (36.9 °C)-98.7 °F (37.1 °C)] 98.4 °F (36.9 °C)  Pulse:  [80-89] 80  Resp:  [16] 16  SpO2:  [99 %] 99 %  BP: (113)/(71-80) 113/80     Weight: 53.5 kg (117 lb 15.1 oz)  Body mass index is 22.29 kg/m².    Physical Exam   Constitutional: She is oriented to person, place, and time. She appears well-developed.   HENT:   Seizure monitoring headband in place   Neck: No JVD present. No tracheal deviation present.   Cardiovascular: Normal rate and regular rhythm.   Pulmonary/Chest: Breath sounds normal. No respiratory distress.   Left chest port-a-cath in place with well-healed incision. No erythema, fluctuance, or tenderness to palpation. Looks very normal.   Abdominal: Soft. She exhibits no distension and no mass. There is no tenderness. There is no rebound and no guarding.   RLQ end ileostomy with good output   Musculoskeletal: She exhibits no edema.   Neurological: She is alert and oriented to person, place, and time.   Skin: Skin is warm and dry.   flushed       Significant Labs:  CBC:   Recent Labs   Lab 03/11/20  1152   WBC 6.27   RBC 4.51   HGB 12.9   HCT 41.5      MCV 92   MCH 28.6   MCHC 31.1*     CMP:   Recent Labs   Lab 03/11/20  1152   GLU 92   CALCIUM 9.2   ALBUMIN 3.6   PROT 7.8      K 4.3   CO2  23      BUN 16   CREATININE 0.8   ALKPHOS 165*   ALT 43   AST 29   BILITOT 0.2     Microbiology Results (last 7 days)     Procedure Component Value Units Date/Time    Blood culture [[id number]] Collected:  03/11/20 1505    Order Status:  Sent Specimen:  Blood Updated:  03/11/20 1505    Blood culture [[id number]]     Order Status:  Sent Specimen:  Blood           Significant Diagnostics:  I have reviewed all pertinent imaging results/findings within the past 24 hours.    Assessment/Plan:     Transient neurological symptoms  Patient is a 40yo female with flushing and seizure-like activity, who has a port in place for bi-weekly IV infusions. Surgery consulted to rule out port infection as a cause for her flushing, possible septic emboli?    - There is no clinical evidence of a port pocket infection  - Patient is afebrile with no leukocytosis. Would not recommend removal of port as it is still functional and is very unlikely the cause of her symptoms.   - If the patient has positive blood cultures, then may call surgery back about consideration port removal. However, often able to prophylactically treat blood stream infections without port removal as long it is not complicated (cellulitis, pus in pocket, etc)        VTE Risk Mitigation (From admission, onward)         Ordered     IP VTE LOW RISK PATIENT  Once      03/11/20 0927     Place sequential compression device  Until discontinued      03/11/20 0927                Thank you for your consult. I will sign off. Please contact us if you have any additional questions.    Quiana Vega MD  General Surgery  [facility]

## 2020-03-12 LAB
ASCENDING AORTA: 3.07 CM
AV INDEX (PROSTH): 1.01
AV MEAN GRADIENT: 4 MMHG
AV PEAK GRADIENT: 6 MMHG
AV VALVE AREA: 3.14 CM2
AV VELOCITY RATIO: 1.11
BSA FOR ECHO PROCEDURE: 1.51 M2
CV ECHO LV RWT: 0.29 CM
DOP CALC AO PEAK VEL: 1.22 M/S
DOP CALC AO VTI: 23.73 CM
DOP CALC LVOT AREA: 3.1 CM2
DOP CALC LVOT DIAMETER: 1.99 CM
DOP CALC LVOT PEAK VEL: 1.35 M/S
DOP CALC LVOT STROKE VOLUME: 74.42 CM3
DOP CALCLVOT PEAK VEL VTI: 23.94 CM
E WAVE DECELERATION TIME: 140.52 MSEC
E/A RATIO: 1.61
E/E' RATIO: 7.54 M/S
ECHO LV POSTERIOR WALL: 0.57 CM (ref 0.6–1.1)
FRACTIONAL SHORTENING: 41 % (ref 28–44)
INTERVENTRICULAR SEPTUM: 0.62 CM (ref 0.6–1.1)
LA MAJOR: 3.56 CM
LA MINOR: 4.07 CM
LA WIDTH: 2.82 CM
LEFT ATRIUM SIZE: 2.9 CM
LEFT ATRIUM VOLUME INDEX: 17.6 ML/M2
LEFT ATRIUM VOLUME: 26.4 CM3
LEFT INTERNAL DIMENSION IN SYSTOLE: 2.35 CM (ref 2.1–4)
LEFT VENTRICLE DIASTOLIC VOLUME INDEX: 45.38 ML/M2
LEFT VENTRICLE DIASTOLIC VOLUME: 68.26 ML
LEFT VENTRICLE MASS INDEX: 42 G/M2
LEFT VENTRICLE SYSTOLIC VOLUME INDEX: 12.8 ML/M2
LEFT VENTRICLE SYSTOLIC VOLUME: 19.22 ML
LEFT VENTRICULAR INTERNAL DIMENSION IN DIASTOLE: 3.96 CM (ref 3.5–6)
LEFT VENTRICULAR MASS: 62.58 G
LV LATERAL E/E' RATIO: 6.13 M/S
LV SEPTAL E/E' RATIO: 9.8 M/S
MV PEAK A VEL: 0.61 M/S
MV PEAK E VEL: 0.98 M/S
PULM VEIN S/D RATIO: 1.42
PV PEAK D VEL: 0.55 M/S
PV PEAK S VEL: 0.78 M/S
RA MAJOR: 3.04 CM
RA PRESSURE: 3 MMHG
RA WIDTH: 2.5 CM
RIGHT VENTRICULAR END-DIASTOLIC DIMENSION: 2.05 CM
SINUS: 2.92 CM
STJ: 2.66 CM
TDI LATERAL: 0.16 M/S
TDI SEPTAL: 0.1 M/S
TDI: 0.13 M/S
TRICUSPID ANNULAR PLANE SYSTOLIC EXCURSION: 2.13 CM

## 2020-03-12 PROCEDURE — 99233 SBSQ HOSP IP/OBS HIGH 50: CPT | Mod: GC,,, | Performed by: PSYCHIATRY & NEUROLOGY

## 2020-03-12 PROCEDURE — 95714 VEEG EA 12-26 HR UNMNTR: CPT

## 2020-03-12 PROCEDURE — 63600175 PHARM REV CODE 636 W HCPCS: Performed by: STUDENT IN AN ORGANIZED HEALTH CARE EDUCATION/TRAINING PROGRAM

## 2020-03-12 PROCEDURE — 25000003 PHARM REV CODE 250: Performed by: STUDENT IN AN ORGANIZED HEALTH CARE EDUCATION/TRAINING PROGRAM

## 2020-03-12 PROCEDURE — 11000001 HC ACUTE MED/SURG PRIVATE ROOM

## 2020-03-12 PROCEDURE — 99233 PR SUBSEQUENT HOSPITAL CARE,LEVL III: ICD-10-PCS | Mod: GC,,, | Performed by: PSYCHIATRY & NEUROLOGY

## 2020-03-12 RX ORDER — HEPARIN 100 UNIT/ML
3 SYRINGE INTRAVENOUS
Status: DISCONTINUED | OUTPATIENT
Start: 2020-03-12 | End: 2020-03-14 | Stop reason: HOSPADM

## 2020-03-12 RX ADMIN — MIRTAZAPINE 30 MG: 15 TABLET, ORALLY DISINTEGRATING ORAL at 10:03

## 2020-03-12 RX ADMIN — GABAPENTIN 300 MG: 300 CAPSULE ORAL at 09:03

## 2020-03-12 RX ADMIN — VALACYCLOVIR HYDROCHLORIDE 500 MG: 500 TABLET, FILM COATED ORAL at 09:03

## 2020-03-12 RX ADMIN — DRONABINOL 5 MG: 2.5 CAPSULE ORAL at 05:03

## 2020-03-12 RX ADMIN — CEPHALEXIN 250 MG: 250 CAPSULE ORAL at 10:03

## 2020-03-12 RX ADMIN — GABAPENTIN 300 MG: 300 CAPSULE ORAL at 10:03

## 2020-03-12 RX ADMIN — LOPERAMIDE HYDROCHLORIDE 2 MG: 2 CAPSULE ORAL at 03:03

## 2020-03-12 RX ADMIN — LOPERAMIDE HYDROCHLORIDE 2 MG: 2 CAPSULE ORAL at 09:03

## 2020-03-12 RX ADMIN — PROMETHAZINE HYDROCHLORIDE 25 MG: 12.5 TABLET ORAL at 10:03

## 2020-03-12 NOTE — PLAN OF CARE
03/12/20 1154   Discharge Assessment   Assessment Type Discharge Planning Assessment   Confirmed/corrected address and phone number on facesheet? Yes   Assessment information obtained from? Patient   Expected Length of Stay (days) 4   Prior to hospitilization cognitive status: Alert/Oriented   Prior to hospitalization functional status: Independent   Current cognitive status: Alert/Oriented   Current Functional Status: Independent   Lives With significant other;child(katalina), dependent   Able to Return to Prior Arrangements yes   Is patient able to care for self after discharge? Yes   Who are your caregiver(s) and their phone number(s)? Milan Salgado (dad) [phone], [phone]; Shaila Salgado [phone]   Patient's perception of discharge disposition home or selfcare   Readmission Within the Last 30 Days no previous admission in last 30 days   Patient currently being followed by outpatient case management? No   Patient currently receives any other outside agency services? No   Equipment Currently Used at Home colostomy/ostomy supplies   Do you have any problems affording any of your prescribed medications? No   Is the patient taking medications as prescribed? yes   Does the patient have transportation home? Yes   Transportation Anticipated family or friend will provide  (Dad)   Dialysis Name and Scheduled days na   Does the patient receive services at the Coumadin Clinic? No   Discharge Plan A Home with family   Discharge Plan B Home with family   DME Needed Upon Discharge  none   Patient/Family in Agreement with Plan yes     CM met with patient in room for Discharge Planning Assessment.  Per patient,  patient lives with boyfriend and daughter in a(n) 2SH with 3 steps to enter.   Per patient, patient was independent with ADLS and used no DME for ambulation.  Per patient, the patient will have assistance from family upon discharge.  Discharge Planning Booklet given to patient/family and discussed.  All questions  addressed.    PCP:  Kalia Astorga MD      Pharmacy:    [id number]  [place] [address] AT [facility] [address]  [address] [place] [address]  Phone: [phone] Fax: [phone]        Emergency Contacts:  Extended Emergency Contact Information  Primary Emergency Contact: NaomiMilan  Address: [address] [place] [address]  Home Phone: [phone]  Mobile Phone: [phone]  Relation: Father  Secondary Emergency Contact: Shaila Salgado   [place]  Mobile Phone: [phone]  Relation: Mother      Insurance:    Payor: MEDICARE / Plan: MEDICARE PART A & B / Product Type: Government /       María Munoz RN  Case Management  Ext: [phone]  03/12/2020  11:59 AM

## 2020-03-12 NOTE — PROCEDURES
[facility] EEG/VIDEO MONITORING REPORT  Ivy Salgado  [id number]  1980    DATE OF SERVICE:  03/11/2020  DATE OF ADMISSION: 3/11/2020  9:15 AM    ADMITTING/REQUESTING PROVIDER: Arielle Bates MD PhD    REASON FOR CONSULT:  39-year-old woman with Crohn's disease, melanoma, Port-A-Cath for biologics infusions, frequent urinary tract infections on chronic cephalexin with episodes of flushing, altered consciousness, high-frequency low-amplitude shaking of the limbs, admitted to the epilepsy monitoring unit for event capture and characterization.    METHODOLOGY   Electroencephalographic (EEG) recording is with electrodes placed according to the International 10-20 placement system.  Thirty two (32) channels of digital signal (sampling rate of 512/sec) including T1 and T2 was simultaneously recorded from the scalp and may include  EKG, EMG, and/or eye monitors.  Recording band pass was 0.1 to 512 hz.  Digital video recording of the patient is simultaneously recorded with the EEG.  The patient is instructed report clinical symptoms which may occur during the recording session.  EEG and video recording is stored and archived in digital format.  Activation procedures which include photic stimulation, hyperventilation and instructing patients to perform simple task are done in selected patients.   The EEG is displayed on a monitor screen and can be reviewed using different montages.  Computer assisted analysis is employed to detect spike and electrographic seizure activity.   The entire record is submitted for computer analysis.  The entire recording is visually reviewed and the times identified by computer analysis as being spikes or seizures are reviewed again.  Compresses spectral analysis (CSA) is also performed on the activity recorded from each individual channel.  This is displayed as a power display of frequencies from 0 to 30 Hz over time.   The CSA is reviewed looking for asymmetries in power between homologous  areas of the scalp and then compared with the original EEG recording.     Streak software is also utilized in the review of this study.  This software suite analyzes the EEG recording in multiple domains.  Coherence and rhythmicity is computed to identify EEG sections which may contain organized seizures.  Each channel undergoes analysis to detect presence of spike and sharp waves which have special and morphological characteristic of epileptic activity.  The routine EEG recording is converted from spacial into frequency domain.  This is then displayed comparing homologous areas to identify areas of significant asymmetry.  Algorithm to identify non-cortically generated artifact is used to separate eye movement, EMG and other artifact from the EEG.      RECORDING TIMES  Start on 03/11/2020 at 10:30 a.m.  Stop on 03/12/2020 at 07:00 a.m.  A total of 20 hr and 23 min of EEG recording is obtained.    EEG FINDINGS  Background activity:   The waking background is continuous and symmetric with a well-formed 11 hz posterior dominant rhythm.  There are overriding faster frequencies especially frontally.    There are no pushbutton activations.    Sleep:  The patient transitions from wakefulness to sleep with the appearance of sleep spindles, K complexes, and vertex waves and occasional higher amplitude waveforms over the left temporal region which do not appear epileptiform.    Activation procedures:   Hyperventilation is not performed  Photic stimulation is not performed    Cardiac Monitor:   Heart rate appears generally regular on a single lead EKG.    Impression:   This is a normal continuous EEG monitoring study.  There are no prominent focal findings, no epileptiform discharges, and no electrographic seizures.  There are no pushbutton activations and none of the patient's typical episodes are captured during this recording session.      LTM Summary 03/11/2020 - TBD:  Patient events/Seizures:  None  Interictal findings:   None  Other notable abnormalities:   normal EEG    Arielle Bates MD PhD  Neurology-Epilepsy  [facility].  [facility]

## 2020-03-12 NOTE — PLAN OF CARE
03/12/20 1152   Post-Acute Status   Post-Acute Authorization Other   Other Status No Post-Acute Service Needs   Discharge Delays (!) Procedure Scheduling (IR, OR, Labs, Echo, Cath, Echo, EEG)   Discharge Plan   Discharge Plan A Home with family   Discharge Plan B Home with family     Continuous EEG in progress    María Munoz RN  Case Management  Ext: [phone]  03/12/2020  11:53 AM

## 2020-03-12 NOTE — PROGRESS NOTES
[facility]  Epilepsy Monitoring Unit  Daily Progress Note    Date: 03/12/2020    Patient Name: Ivy Salgado   MRN: [id number]   PCP: Kalia Astorga    Subjective:     No clinical events overnight.      HOSPITAL COURSE:  3/11->3/12: No clinical events.    PAST MEDICAL HISTORY:  Past Medical History:   Diagnosis Date    Abnormal Pap smear 2007    Abnormal Pap smear 5/26/2011    Anemia     Anxiety     Arthritis     C. difficile diarrhea     Crohn's disease     Depression 8/5/2017    Encounter for blood transfusion     Genital HSV     History of colposcopy with cervical biopsy 2007 and 7/2011 2007-LYLA I  and 7/2011- LYLA I    Hypertension     Kidney stone     Kidney stone     Melanoma     Recurrent UTI 4/3/2013    S/P ileostomy 7/9/2012    Sterilization 6/23/2012     PAST SURGICAL HISTORY:  Past Surgical History:   Procedure Laterality Date    ABDOMINAL SURGERY      APPENDECTOMY      BILATERAL SALPINGO-OOPHORECTOMY (BSO) Bilateral 5/30/2019    Procedure: SALPINGO-OOPHORECTOMY, BILATERAL;  Surgeon: Rupa German MD;  Location: [facility] OR [address] [place] [address];  Service: OB/GYN;  Laterality: Bilateral;    BLADDER SURGERY      partial cystectomy due to fistula    CKC      COLON SURGERY      COLONOSCOPY      EXCISION OF MELANOMA  07/17/2019    ILEOSTOMY      LYSIS OF ADHESIONS N/A 5/30/2019    Procedure: LYSIS, ADHESIONS;  Surgeon: Rupa German MD;  Location: [facility] OR [address] [place] [address];  Service: OB/GYN;  Laterality: N/A;    OOPHORECTOMY Right 04/16/2015    PORTACATH PLACEMENT  02/21/2017    SKIN BIOPSY      SMALL INTESTINE SURGERY      age 18 Y    TOTAL ABDOMINAL HYSTERECTOMY  04/16/2015    TOTAL COLECTOMY      TUBAL LIGATION  06/06/2012    UPPER GASTROINTESTINAL ENDOSCOPY       CURRENT MEDS:  Current Facility-Administered Medications   Medication Dose Route Frequency Provider Last Rate Last Dose    acetaminophen tablet 650 mg  650 mg Oral Q4H PRN James Ross MD         Bi-est/PROG/DHEA cream  1 g Apply Externally Daily James Ross MD        cephALEXin capsule 250 mg  250 mg Oral QHS James Ross MD        docusate sodium capsule 100 mg  100 mg Oral BID PRN James Ross MD        dronabinoL capsule 5 mg  5 mg Oral BID AC James Ross MD   5 mg at 03/12/20 0555    gabapentin capsule 300 mg  300 mg Oral BID James Ross MD   300 mg at 03/12/20 0932    heparin, porcine (PF) 100 unit/mL injection flush 300 Units  3 mL Intra-Catheter PRN Arielle Bates MD PhD        loperamide capsule 2 mg  2 mg Oral Q3H PRN James Ross MD   2 mg at 03/12/20 1525    mirtazapine disintegrating tablet 30 mg  30 mg Oral Nightly James Ross MD   30 mg at 03/11/20 2023    promethazine tablet 25 mg  25 mg Oral Q6H PRN James Ross MD        sodium chloride 0.9% flush 10 mL  10 mL Intravenous PRN James Ross MD        Testosterone gel  1 g Apply Externally Daily James Ross MD   1 g at 03/12/20 0900    valACYclovir tablet 500 mg  500 mg Oral Daily James Ross MD   500 mg at 03/12/20 0932     ALLERGIES:  Review of patient's allergies indicates:   Allergen Reactions    Azathioprine sodium Other (See Comments)     Other reaction(s): pancreatitis  Other reaction(s): pancreatitis    Methotrexate analogues Other (See Comments)     leukopenia    Stelara [ustekinumab] Other (See Comments)     Multiple infections    Zofran [ondansetron hcl (pf)] Other (See Comments)     Per patient causes prolong QT    Vancomycin analogues Hives    Morphine Itching and Other (See Comments)     Other reaction(s): Itching    Bactrim [sulfamethoxazole-trimethoprim] Palpitations    Ciprofloxacin Palpitations     FAMILY HISTORY:  Family History   Problem Relation Age of Onset    Colon cancer Father     Cancer Father         colon cancer    Liver cancer Father     Hypertension Mother     Cancer Maternal Grandfather         esophageal      Skin  "cancer Maternal Grandfather     Endometrial cancer Maternal Aunt     Crohn's disease Brother     Breast cancer Neg Hx     Ovarian cancer Neg Hx     Melanoma Neg Hx      SOCIAL HISTORY:  Social History     Tobacco Use    Smoking status: Never Smoker    Smokeless tobacco: Never Used   Substance Use Topics    Alcohol use: Not Currently     Alcohol/week: 0.0 standard drinks    Drug use: No     Review of Systems:  12 system review of systems is negative except as noted in HPI.      Objective:     Vitals:    03/12/20 1054 03/12/20 1125 03/12/20 1518 03/12/20 1525   BP: 106/69 106/69  (!) 143/86   BP Location:       Patient Position:       Pulse: 76 71 98 92   Resp: 18   18   Temp: 97.7 °F (36.5 °C)   99.2 °F (37.3 °C)   TempSrc:       SpO2: 97%   98%   Weight:  53.1 kg (117 lb)     Height:  5' 1" (1.549 m)         General: NAD, well nourished   Eyes: no tearing, discharge, no erythema   ENT: moist mucous membranes of the oral cavity, nares patent    Neck: Supple, Full range of motion  Cardiovascular: Warm and well perfused, pulses equal and symmetrical  Lungs: Normal work of breathing, normal chest wall excursions  Skin: No rash, lesions, or breakdown on exposed skin  Psychiatry: Mood and affect are appropriate   Abdomen: soft, non tender, non distended.  Colostomy in place.  Extremeties: No cyanosis, clubbing or edema.    Neurological   MENTAL STATUS: Alert and oriented to person, place, and time. Attention and concentration within normal limits. Speech without dysarthria, able to name and repeat without difficulty. Recent and remote memory within normal limits   CRANIAL NERVES: Visual fields intact. PERRL. EOMI. Facial sensation intact. Face symmetrical. Hearing grossly intact. Full shoulder shrug bilaterally. Tongue protrudes midline   SENSORY: Sensation is intact to light touch throughout.   MOTOR: Normal bulk and tone. No pronator drift.  5/5 deltoid, biceps, triceps, interosseous, hand  bilaterally. 5/5 " iliopsoas, knee extension/flexion, foot dorsi/plantarflexion bilaterally.    REFLEXES: Symmetric and 2+ throughout. Toes down going bilaterally.   CEREBELLAR/COORDINATION/GAIT: finger/nose intact b/l.  Gait deferred 2/2 seizure precautions.    Labs:   Recent Results (from the past 24 hour(s))   Toxicology screen, urine    Collection Time: 03/11/20  7:01 PM   Result Value Ref Range    Alcohol, Urine <10 <10 mg/dL    Benzodiazepines Presumptive Positive     Methadone metabolites Negative     Cocaine (Metab.) Negative     Opiate Scrn, Ur Negative     Barbiturate Screen, Ur Negative     Amphetamine Screen, Ur Negative     THC Presumptive Positive     Phencyclidine Negative     Creatinine, Random Ur 96.0 15.0 - 325.0 mg/dL    Toxicology Information SEE COMMENT    Urinalysis, Reflex to Urine Culture Urine, Clean Catch    Collection Time: 03/11/20  7:01 PM   Result Value Ref Range    Specimen UA Urine, Clean Catch     Color, UA Yellow Yellow, Straw, Cheri    Appearance, UA Hazy (A) Clear    pH, UA 6.0 5.0 - 8.0    Specific Gravity, UA 1.020 1.005 - 1.030    Protein, UA Negative Negative    Glucose, UA Negative Negative    Ketones, UA Negative Negative    Bilirubin (UA) Negative Negative    Occult Blood UA Negative Negative    Nitrite, UA Negative Negative    Leukocytes, UA Negative Negative   Echo Color Flow Doppler? Yes; Bubble Contrast? Yes    Collection Time: 03/12/20  2:10 PM   Result Value Ref Range    Ascending aorta 3.07 cm    STJ 2.66 cm    AV mean gradient 4 mmHg    Ao peak richy 1.22 m/s    Ao VTI 23.73 cm    IVS 0.62 0.6 - 1.1 cm    LA size 2.90 cm    Left Atrium Major Axis 3.56 cm    Left Atrium Minor Axis 4.07 cm    LVIDD 3.96 3.5 - 6.0 cm    LVIDS 2.35 2.1 - 4.0 cm    LVOT diameter 1.99 cm    LVOT peak VTI 23.94 cm    PW 0.57 (A) 0.6 - 1.1 cm    MV Peak A Richy 0.61 m/s    E wave decelartion time 140.52 msec    MV Peak E Richy 0.98 m/s    PV Peak D Richy 0.55 m/s    PV Peak S Richy 0.78 m/s    RA Major Axis 3.04 cm     RA Width 2.50 cm    RVDD 2.05 cm    Sinus 2.92 cm    TAPSE 2.13 cm    TDI LATERAL 0.16 m/s    TDI SEPTAL 0.10 m/s    LA WIDTH 2.82 cm    LV Diastolic Volume 68.26 mL    LV Systolic Volume 19.22 mL    LVOT peak pablo 1.35 m/s    LV LATERAL E/E' RATIO 6.13 m/s    LV SEPTAL E/E' RATIO 9.80 m/s    FS 41 %    LA volume 26.40 cm3    LV mass 62.58 g    Left Ventricle Relative Wall Thickness 0.29 cm    AV valve area 3.14 cm2    AV Velocity Ratio 1.11     AV index (prosthetic) 1.01     E/A ratio 1.61     Mean e' 0.13 m/s    Pulm vein S/D ratio 1.42     LVOT area 3.1 cm2    LVOT stroke volume 74.42 cm3    AV peak gradient 6 mmHg    E/E' ratio 7.54 m/s    LV Systolic Volume Index 12.8 mL/m2    LV Diastolic Volume Index 45.38 mL/m2    LA Volume Index 17.6 mL/m2    LV Mass Index 42 g/m2    BSA 1.51 m2    Right Atrial Pressure (from IVC) 3 mmHg       Microbiology Results (last 7 days)     Procedure Component Value Units Date/Time    Blood culture [[id number]] Collected:  03/11/20 1505    Order Status:  Completed Specimen:  Blood Updated:  03/12/20 0145     Blood Culture, Routine No Growth to date    Narrative:       Please draw one culture from port and other from periphery    Blood culture [[id number]]     Order Status:  Sent Specimen:  Blood           Imaging:   3/11/20 EKG: Per independent resident review and interpretation,  NSR.  QTc 467.    Diagnostic EEG Results:  Date: 3/11-3/12  EEG Background: 11 Hz PDR, symmetric     Activation Procedures: None    Seizures/Events: None       Assessment:      This is Ivy Salgado, 39 y.o. female presenting for event characterization.           Plan:   History of recurrent UTI (urinary tract infection)  Continue home keflex 250 mg Qdaily    Complex partial epilepsy with generalization and with intractable epilepsy  Ms. Ivy Salgado is a 39 y.o. female w/ PMH significant for Crohn's, nephrolithiasis, melanoma, HTN, who presents with a chief complaint of evaluation for epilepsy.   Referred by Dr. Miller.    Based on description of events, differential could include rigors/infection (port -> biofilm? Recurrent UTIs), PNEE, epilepsy, pheochromocytoma, carcinoid syndrome, disulfiram like reaction.    She reports that drinking a beer reliably triggers her events.  Her boyfriend will bring a beer to room tomorrow and she will drink it in the afternoon.  We will collect urine/plasma metanephrines, EKG, and monitor closely for response at that time.  We discussed the risks/benefits of this plan, and she is in agreement to proceed.    Plan  -Continuous EEG  -Consult gen surgery; appreciate input   -F/u blood, urine cultures, TTE  -HOLD home clonazepam 1 mg Qdaily, ambien 10 mg QHS PRN  -Continue home gabapentin 300 mg BID  -HV/PS Qdaily  -Ativan 2 mg IV PRN for gen seizure activity >3-5 minutes duration  -Seizure, fall precautions    Transient neurological symptoms  See epilepsy section      Appetite impaired  Continue home marinol     Joint pain  Continue home gabapentin     Herpes genitalis in women  Continue home valtrex     Generalized anxiety disorder  Continue home remeron  HOLD home clonazepam 1 mg Qdaily PRN    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

## 2020-03-12 NOTE — NURSING
Patient identified by 2 identifiers.   Allergies reviewed.  20 g IV in place to Lt FA.  Bubble study explained to patient, patient verbalized understanding.  Bubble performed X 2, with & without Valsalva.  Pt tolerated procedure well.

## 2020-03-12 NOTE — HOSPITAL COURSE
3/11->3/12: No clinical events.  3/12->3/13: one clinical event of shaking and feeling clammy at 3/12 1605.  No epileptic correlate.

## 2020-03-12 NOTE — PLAN OF CARE
Pt AAOx4.  POC and meds reviewed with patient.  Questions encouraged and answered.  Patient verbalized understanding.  V/S stable throughout shift; please see flowsheet for details and full assessments.  NAEO.  Siderails up x 2, bed locked and in low position.  Call bell in reach.  02 and suction at bedside.  Will CTM. No events overnight

## 2020-03-13 ENCOUNTER — TELEPHONE (OUTPATIENT)
Dept: UROLOGY | Facility: CLINIC | Age: 40
End: 2020-03-13

## 2020-03-13 PROCEDURE — 99233 SBSQ HOSP IP/OBS HIGH 50: CPT | Mod: GC,,, | Performed by: PSYCHIATRY & NEUROLOGY

## 2020-03-13 PROCEDURE — 95714 VEEG EA 12-26 HR UNMNTR: CPT

## 2020-03-13 PROCEDURE — 25000003 PHARM REV CODE 250: Performed by: STUDENT IN AN ORGANIZED HEALTH CARE EDUCATION/TRAINING PROGRAM

## 2020-03-13 PROCEDURE — 63600175 PHARM REV CODE 636 W HCPCS: Performed by: STUDENT IN AN ORGANIZED HEALTH CARE EDUCATION/TRAINING PROGRAM

## 2020-03-13 PROCEDURE — 95720 EEG PHY/QHP EA INCR W/VEEG: CPT | Mod: ,,, | Performed by: PSYCHIATRY & NEUROLOGY

## 2020-03-13 PROCEDURE — 99233 PR SUBSEQUENT HOSPITAL CARE,LEVL III: ICD-10-PCS | Mod: GC,,, | Performed by: PSYCHIATRY & NEUROLOGY

## 2020-03-13 PROCEDURE — 94761 N-INVAS EAR/PLS OXIMETRY MLT: CPT

## 2020-03-13 PROCEDURE — 95720 PR EEG, W/VIDEO, CONT RECORD, I&R, >12<26 HRS: ICD-10-PCS | Mod: ,,, | Performed by: PSYCHIATRY & NEUROLOGY

## 2020-03-13 PROCEDURE — 87040 BLOOD CULTURE FOR BACTERIA: CPT

## 2020-03-13 PROCEDURE — 11000001 HC ACUTE MED/SURG PRIVATE ROOM

## 2020-03-13 RX ADMIN — CEPHALEXIN 250 MG: 250 CAPSULE ORAL at 09:03

## 2020-03-13 RX ADMIN — VALACYCLOVIR HYDROCHLORIDE 500 MG: 500 TABLET, FILM COATED ORAL at 08:03

## 2020-03-13 RX ADMIN — GABAPENTIN 300 MG: 300 CAPSULE ORAL at 09:03

## 2020-03-13 RX ADMIN — PROMETHAZINE HYDROCHLORIDE 25 MG: 12.5 TABLET ORAL at 09:03

## 2020-03-13 RX ADMIN — MIRTAZAPINE 30 MG: 15 TABLET, ORALLY DISINTEGRATING ORAL at 09:03

## 2020-03-13 RX ADMIN — LOPERAMIDE HYDROCHLORIDE 2 MG: 2 CAPSULE ORAL at 09:03

## 2020-03-13 RX ADMIN — LOPERAMIDE HYDROCHLORIDE 2 MG: 2 CAPSULE ORAL at 08:03

## 2020-03-13 RX ADMIN — DRONABINOL 5 MG: 2.5 CAPSULE ORAL at 06:03

## 2020-03-13 RX ADMIN — DRONABINOL 5 MG: 2.5 CAPSULE ORAL at 03:03

## 2020-03-13 RX ADMIN — ACETAMINOPHEN 650 MG: 325 TABLET ORAL at 07:03

## 2020-03-13 NOTE — PROGRESS NOTES
[facility]  Epilepsy Monitoring Unit  Daily Progress Note    Date: 03/13/2020  Patient Name: Ivy Salgado   MRN: [id number]   PCP: Kalia Astorga    Subjective:     One clinical event during HV yesterday afternoon.  She describes as being of similar character to her typical event, but less severe.      HOSPITAL COURSE:  3/11->3/12: No clinical events.  3/12->3/13: one clinical event of shaking and feeling clammy at 3/12 1605.  No epileptic correlate.    PAST MEDICAL HISTORY:  Past Medical History:   Diagnosis Date    Abnormal Pap smear 2007    Abnormal Pap smear 5/26/2011    Anemia     Anxiety     Arthritis     C. difficile diarrhea     Crohn's disease     Depression 8/5/2017    Encounter for blood transfusion     Genital HSV     History of colposcopy with cervical biopsy 2007 and 7/2011 2007-LYLA I  and 7/2011- LYLA I    Hypertension     Kidney stone     Kidney stone     Melanoma     Recurrent UTI 4/3/2013    S/P ileostomy 7/9/2012    Sterilization 6/23/2012     PAST SURGICAL HISTORY:  Past Surgical History:   Procedure Laterality Date    ABDOMINAL SURGERY      APPENDECTOMY      BILATERAL SALPINGO-OOPHORECTOMY (BSO) Bilateral 5/30/2019    Procedure: SALPINGO-OOPHORECTOMY, BILATERAL;  Surgeon: Rupa German MD;  Location: [facility] OR [address] [place] [address];  Service: OB/GYN;  Laterality: Bilateral;    BLADDER SURGERY      partial cystectomy due to fistula    CKC      COLON SURGERY      COLONOSCOPY      EXCISION OF MELANOMA  07/17/2019    ILEOSTOMY      LYSIS OF ADHESIONS N/A 5/30/2019    Procedure: LYSIS, ADHESIONS;  Surgeon: Rupa German MD;  Location: [facility] OR [address] [place] [address];  Service: OB/GYN;  Laterality: N/A;    OOPHORECTOMY Right 04/16/2015    PORTACATH PLACEMENT  02/21/2017    SKIN BIOPSY      SMALL INTESTINE SURGERY      age 18 Y    TOTAL ABDOMINAL HYSTERECTOMY  04/16/2015    TOTAL COLECTOMY      TUBAL LIGATION  06/06/2012    UPPER GASTROINTESTINAL ENDOSCOPY       CURRENT  MEDS:  Current Facility-Administered Medications   Medication Dose Route Frequency Provider Last Rate Last Dose    acetaminophen tablet 650 mg  650 mg Oral Q4H PRN James Ross MD        Bi-est/PROG/DHEA cream  1 g Apply Externally Daily James Ross MD   1 g at 03/13/20 0855    cephALEXin capsule 250 mg  250 mg Oral QHS James Ross MD   250 mg at 03/12/20 2202    docusate sodium capsule 100 mg  100 mg Oral BID PRN James Ross MD        dronabinoL capsule 5 mg  5 mg Oral BID AC James Ross MD   5 mg at 03/13/20 1510    gabapentin capsule 300 mg  300 mg Oral BID James Ross MD   Stopped at 03/13/20 0900    heparin, porcine (PF) 100 unit/mL injection flush 300 Units  3 mL Intra-Catheter PRN Arielle Bates MD PhD        loperamide capsule 2 mg  2 mg Oral Q3H PRN James Ross MD   2 mg at 03/13/20 0859    mirtazapine disintegrating tablet 30 mg  30 mg Oral Nightly James Ross MD   30 mg at 03/12/20 2203    promethazine tablet 25 mg  25 mg Oral Q6H PRN James Ross MD   25 mg at 03/12/20 2203    sodium chloride 0.9% flush 10 mL  10 mL Intravenous PRN James Ross MD        Testosterone gel  1 g Apply Externally Daily James Ross MD   1 g at 03/13/20 0854    valACYclovir tablet 500 mg  500 mg Oral Daily James Ross MD   500 mg at 03/13/20 0854     ALLERGIES:  Review of patient's allergies indicates:   Allergen Reactions    Azathioprine sodium Other (See Comments)     Other reaction(s): pancreatitis  Other reaction(s): pancreatitis    Methotrexate analogues Other (See Comments)     leukopenia    Stelara [ustekinumab] Other (See Comments)     Multiple infections    Zofran [ondansetron hcl (pf)] Other (See Comments)     Per patient causes prolong QT    Vancomycin analogues Hives    Morphine Itching and Other (See Comments)     Other reaction(s): Itching    Bactrim [sulfamethoxazole-trimethoprim] Palpitations    Ciprofloxacin  Palpitations     FAMILY HISTORY:  Family History   Problem Relation Age of Onset    Colon cancer Father     Cancer Father         colon cancer    Liver cancer Father     Hypertension Mother     Cancer Maternal Grandfather         esophageal      Skin cancer Maternal Grandfather     Endometrial cancer Maternal Aunt     Crohn's disease Brother     Breast cancer Neg Hx     Ovarian cancer Neg Hx     Melanoma Neg Hx      SOCIAL HISTORY:  Social History     Tobacco Use    Smoking status: Never Smoker    Smokeless tobacco: Never Used   Substance Use Topics    Alcohol use: Not Currently     Alcohol/week: 0.0 standard drinks    Drug use: No     Review of Systems:  12 system review of systems is negative except as noted in HPI.      Objective:     Vitals:    03/13/20 0800 03/13/20 1050 03/13/20 1142 03/13/20 1437   BP: 109/70 138/78  132/86   BP Location: Right arm      Patient Position: Lying      Pulse: 72 104 90 94   Resp: 18 17  18   Temp: 97.6 °F (36.4 °C) 98.7 °F (37.1 °C)  98.2 °F (36.8 °C)   TempSrc: Oral      SpO2: 95% 97%  100%   Weight:       Height:           General: NAD, well nourished   Eyes: no tearing, discharge, no erythema   ENT: moist mucous membranes of the oral cavity, nares patent    Neck: Supple, Full range of motion  Cardiovascular: Warm and well perfused, pulses equal and symmetrical  Lungs: Normal work of breathing, normal chest wall excursions  Skin: No rash, lesions, or breakdown on exposed skin  Psychiatry: Mood and affect are appropriate   Abdomen: soft, non tender, non distended.  Colostomy in place.  Extremeties: No cyanosis, clubbing or edema.    Neurological   MENTAL STATUS: Alert and oriented to person, place, and time. Attention and concentration within normal limits. Speech without dysarthria, able to name and repeat without difficulty. Recent and remote memory within normal limits   CRANIAL NERVES: Visual fields intact. PERRL. EOMI. Facial sensation intact. Face  symmetrical. Hearing grossly intact. Full shoulder shrug bilaterally. Tongue protrudes midline   SENSORY: Sensation is intact to light touch throughout.   MOTOR: Normal bulk and tone. No pronator drift.  5/5 deltoid, biceps, triceps, interosseous, hand  bilaterally. 5/5 iliopsoas, knee extension/flexion, foot dorsi/plantarflexion bilaterally.    REFLEXES: Symmetric and 2+ throughout BUE.  CEREBELLAR/COORDINATION/GAIT: finger/nose intact b/l.  Gait deferred 2/2 seizure precautions.    Labs:     Microbiology Results (last 7 days)     Procedure Component Value Units Date/Time    Blood culture [[id number]] Collected:  03/13/20 0055    Order Status:  Completed Specimen:  Blood from Line, Subclavian, Left Updated:  03/13/20 1115     Blood Culture, Routine No Growth to date    Narrative:       Drawn from Port    Blood culture [[id number]] Collected:  03/11/20 1505    Order Status:  Completed Specimen:  Blood Updated:  03/12/20 2012     Blood Culture, Routine No Growth to date      No Growth to date    Narrative:       Please draw one culture from port and other from periphery    Blood culture [[id number]]     Order Status:  Sent Specimen:  Blood           Imaging:   3/12 TTE: Per report,  · Normal left ventricular systolic function. The estimated ejection fraction is 63%.  · No wall motion abnormalities.  · Normal LV diastolic function.  · Normal right ventricular systolic function.  · Normal central venous pressure (3 mmHg).       Diagnostic EEG Results:  Date: 3/11-3/12  EEG Background: 11 Hz PDR, symmetric     Activation Procedures:   HV/PS: 3/12    Seizures/Events:   Start Time: 3/12 1605 Stop Time 1608  Vitals: Not recorded during event, tachycardic on EEG.  Clinical Symptoms: maintained JUAN.  Fine, asynchronous, high frequency shaking predominantly in BLE.  Subjective Symptoms: Clammy, shaking.  Reports similar character to typical event, but less severe.  No epileptic correlate on EEG.         Assessment:      This  is Ivy Salgado, 39 y.o. female presenting for event characterization.           Plan:   * Complex partial epilepsy with generalization and with intractable epilepsy  Ms. Ivy Salgado is a 39 y.o. female w/ PMH significant for Crohn's, nephrolithiasis, melanoma, HTN, who presents with a chief complaint of evaluation for epilepsy.  Referred by Dr. Miller.    Based on description of events, differential could include rigors/infection (port -> biofilm? Recurrent UTIs), PNEE, epilepsy, pheochromocytoma, carcinoid syndrome, disulfiram like reaction, POTS.    She reports that drinking a beer reliably triggers her events.  The first event captured on 3/12 following HV did not have an epileptic correlate.  We will evaluate further with a typical trigger for her (beer) and check labs for a pheochromocytoma, including 24 hr urine collection.     We discussed the significant stresses she faces, and she is interested in seeing a psychologist after discharge.      Plan  -Continuous EEG  -urine/serum metanephrines and catecholamines, chromogranin A  -F/u blood, urine cultures to evaluate for infectious etiology  -HOLD home clonazepam 1 mg Qdaily, ambien 10 mg QHS PRN  -Continue home gabapentin 300 mg BID  -HV/PS Qdaily  -Ativan 2 mg IV PRN for gen seizure activity >3-5 minutes duration  -Seizure, fall precautions    Anticipate discharge 3/14 after urine studies finish collection.  Outpatient referral to psychology (discuss stressors/mood) and cardiology (to evaluate for POTS or other etiology).    History of recurrent UTI (urinary tract infection)  Continue home keflex 250 mg Qdaily    Transient neurological symptoms  See epilepsy section      Appetite impaired  Continue home marinol     Joint pain  Continue home gabapentin     Herpes genitalis in women  Continue home valtrex     Generalized anxiety disorder  Continue home remeron  HOLD home clonazepam 1 mg Qdaily PRN    S/P ileostomy  cholostomy in place  Continue home  imodium  Lomotil prescribed previously, but pt reports not taking on a regular basis.

## 2020-03-13 NOTE — PLAN OF CARE
Problem: Adult Inpatient Plan of Care  Goal: Plan of Care Review  Outcome: Ongoing, Progressing  Goal: Absence of Hospital-Acquired Illness or Injury  Outcome: Ongoing, Progressing  Goal: Optimal Comfort and Wellbeing  Outcome: Ongoing, Progressing     POC reviewed with patient. All questions and concerns reviewed. Vital signs stable throughout shift. For full assessment please refer to flowsheet. Fall/ safety precautions implemented & maintained. Bed locked in lowest position, bed alarm activated & audible, & call light in reach. Will continue to monitor.

## 2020-03-14 VITALS
HEIGHT: 61 IN | SYSTOLIC BLOOD PRESSURE: 120 MMHG | OXYGEN SATURATION: 94 % | HEART RATE: 78 BPM | WEIGHT: 117 LBS | TEMPERATURE: 98 F | BODY MASS INDEX: 22.09 KG/M2 | RESPIRATION RATE: 18 BRPM | DIASTOLIC BLOOD PRESSURE: 76 MMHG

## 2020-03-14 PROCEDURE — 25000003 PHARM REV CODE 250: Performed by: STUDENT IN AN ORGANIZED HEALTH CARE EDUCATION/TRAINING PROGRAM

## 2020-03-14 PROCEDURE — 63600175 PHARM REV CODE 636 W HCPCS: Performed by: NURSE PRACTITIONER

## 2020-03-14 PROCEDURE — 99238 PR HOSPITAL DISCHARGE DAY,<30 MIN: ICD-10-PCS | Mod: ,,, | Performed by: PSYCHIATRY & NEUROLOGY

## 2020-03-14 PROCEDURE — 95718 PR EEG, W/VIDEO, CONT RECORD, I&R, 2-12 HRS: ICD-10-PCS | Mod: ,,, | Performed by: PSYCHIATRY & NEUROLOGY

## 2020-03-14 PROCEDURE — 99238 HOSP IP/OBS DSCHRG MGMT 30/<: CPT | Mod: ,,, | Performed by: PSYCHIATRY & NEUROLOGY

## 2020-03-14 PROCEDURE — 95718 EEG PHYS/QHP 2-12 HR W/VEEG: CPT | Mod: ,,, | Performed by: PSYCHIATRY & NEUROLOGY

## 2020-03-14 PROCEDURE — 83835 ASSAY OF METANEPHRINES: CPT

## 2020-03-14 PROCEDURE — 63600175 PHARM REV CODE 636 W HCPCS: Performed by: STUDENT IN AN ORGANIZED HEALTH CARE EDUCATION/TRAINING PROGRAM

## 2020-03-14 PROCEDURE — 63600175 PHARM REV CODE 636 W HCPCS: Performed by: PSYCHIATRY & NEUROLOGY

## 2020-03-14 RX ORDER — IBUPROFEN 200 MG
600 TABLET ORAL ONCE
Status: DISCONTINUED | OUTPATIENT
Start: 2020-03-14 | End: 2020-03-14

## 2020-03-14 RX ADMIN — VALACYCLOVIR HYDROCHLORIDE 500 MG: 500 TABLET, FILM COATED ORAL at 08:03

## 2020-03-14 RX ADMIN — HEPARIN 300 UNITS: 100 SYRINGE at 05:03

## 2020-03-14 RX ADMIN — GABAPENTIN 300 MG: 300 CAPSULE ORAL at 08:03

## 2020-03-14 RX ADMIN — MAGNESIUM SULFATE HEPTAHYDRATE 1 G: 500 INJECTION, SOLUTION INTRAMUSCULAR; INTRAVENOUS at 01:03

## 2020-03-14 RX ADMIN — ACETAMINOPHEN 650 MG: 325 TABLET ORAL at 08:03

## 2020-03-14 RX ADMIN — ACETAMINOPHEN 650 MG: 325 TABLET ORAL at 12:03

## 2020-03-14 RX ADMIN — DRONABINOL 5 MG: 2.5 CAPSULE ORAL at 06:03

## 2020-03-14 RX ADMIN — LOPERAMIDE HYDROCHLORIDE 2 MG: 2 CAPSULE ORAL at 08:03

## 2020-03-14 RX ADMIN — LOPERAMIDE HYDROCHLORIDE 2 MG: 2 CAPSULE ORAL at 12:03

## 2020-03-14 NOTE — CARE UPDATE
Notified by patient's nurse of patient c/o HA unrelieved by tylenol and worsening. No change in neuro exam. Magnesium 1 gm IV x 1 and heat or ice (what ever the patient prefers) to the head/neck prn ordered.    Samantha Harper, FARAZ  [facility]  Department of Vascular Neurology   [facility]  [phone]

## 2020-03-14 NOTE — PROCEDURES
[facility] EEG/VIDEO MONITORING REPORT  Ivy Salgado  [id number]  1980    DATE OF SERVICE:  03/12/2020  DATE OF ADMISSION: 3/11/2020  9:15 AM    ADMITTING/REQUESTING PROVIDER: Arielle Bates MD PhD    REASON FOR CONSULT:  39-year-old woman with Crohn's disease, melanoma, Port-A-Cath for biologics infusions, frequent urinary tract infections on chronic cephalexin with episodes of flushing, altered consciousness, high-frequency low-amplitude shaking of the limbs, admitted to the epilepsy monitoring unit for event capture and characterization.    METHODOLOGY   Electroencephalographic (EEG) recording is with electrodes placed according to the International 10-20 placement system.  Thirty two (32) channels of digital signal (sampling rate of 512/sec) including T1 and T2 was simultaneously recorded from the scalp and may include  EKG, EMG, and/or eye monitors.  Recording band pass was 0.1 to 512 hz.  Digital video recording of the patient is simultaneously recorded with the EEG.  The patient is instructed report clinical symptoms which may occur during the recording session.  EEG and video recording is stored and archived in digital format.  Activation procedures which include photic stimulation, hyperventilation and instructing patients to perform simple task are done in selected patients.   The EEG is displayed on a monitor screen and can be reviewed using different montages.  Computer assisted analysis is employed to detect spike and electrographic seizure activity.   The entire record is submitted for computer analysis.  The entire recording is visually reviewed and the times identified by computer analysis as being spikes or seizures are reviewed again.  Compresses spectral analysis (CSA) is also performed on the activity recorded from each individual channel.  This is displayed as a power display of frequencies from 0 to 30 Hz over time.   The CSA is reviewed looking for asymmetries in power between homologous  "areas of the scalp and then compared with the original EEG recording.     [employer] software is also utilized in the review of this study.  This software suite analyzes the EEG recording in multiple domains.  Coherence and rhythmicity is computed to identify EEG sections which may contain organized seizures.  Each channel undergoes analysis to detect presence of spike and sharp waves which have special and morphological characteristic of epileptic activity.  The routine EEG recording is converted from spacial into frequency domain.  This is then displayed comparing homologous areas to identify areas of significant asymmetry.  Algorithm to identify non-cortically generated artifact is used to separate eye movement, EMG and other artifact from the EEG.      RECORDING TIMES  Start on 03/12/2020 at 07:00 a.m.  Stop on 03/13/2020 at 07:00 a.m.  A total of 23 hr and 54 min of EEG recording is obtained.    EEG FINDINGS  Background activity:   The waking background is continuous and symmetric with a well-formed 11 hz posterior dominant rhythm.  There are overriding faster frequencies especially frontally.    There is a single purposeful pushbutton activation at 16:05 p.m. after the patient completes the hyperventilation provoking maneuver.  On video, she appears somewhat uncomfortable.  She reports that she feels "clammy and a little shaky." She has small continuous tremulous jerks of the arms and legs that appears similar to shivering for several minutes.  She then closes her eyes and rests.  There are no epileptiform features on the EEG during this time with a normal posterior dominant rhythm.    Sleep:  The patient transitions from wakefulness to sleep with the appearance of sleep spindles, K complexes, and vertex waves and occasional higher amplitude slower frequency waveforms over the left temporal region which do not appear epileptiform.    Activation procedures:   Photic stimulation is performed with no activation of " the record.  Hyperventilation is performed after which the patient has one of her typical events as described above.    Cardiac Monitor:   Heart rate appears generally regular on a single lead EKG.    Impression:   This is an abnormal continuous EEG because of one of the patient's typical episodes of feeling unwell with high-frequency, low-amplitude jerks of the arms and legs with no EEG correlate.  Clinically, the event does not appear consistent with an epileptic seizure.  There are no epileptiform discharges and no electrographic seizures.  The diagnosis of psychogenic nonepileptic events (PNEE) is a diagnosis of exclusion.  Clinical correlation is advised.    LTM Summary 03/11/2020 - TBD:  Patient events/Seizures:     03/12/2020 at 16:05 p.m:  A typical patient episode of feeling unwell with high-frequency, low-amplitude jerks of the arms and legs with no EEG correlate.  Interictal findings:  None  Other notable abnormalities: none     Arielle Bates MD PhD  Neurology-Epilepsy  [facility]  [facility]

## 2020-03-14 NOTE — PLAN OF CARE
Problem: Adult Inpatient Plan of Care  Goal: Plan of Care Review  Outcome: Adequate for Care Transition  Goal: Patient-Specific Goal (Individualization)  Outcome: Adequate for Care Transition  Goal: Absence of Hospital-Acquired Illness or Injury  Outcome: Adequate for Care Transition  Goal: Optimal Comfort and Wellbeing  Outcome: Adequate for Care Transition  Goal: Readiness for Transition of Care  Outcome: Adequate for Care Transition  Goal: Rounds/Family Conference  Outcome: Adequate for Care Transition     Problem: Infection  Goal: Infection Symptom Resolution  Outcome: Adequate for Care Transition     Problem: Fall Injury Risk  Goal: Absence of Fall and Fall-Related Injury  Outcome: Adequate for Care Transition

## 2020-03-14 NOTE — NURSING
Pt discharged home with discharge instructions as well as personal belongings, was informed to follow up with pcp, and contact with any questions or concerns.

## 2020-03-14 NOTE — CARE UPDATE
Rapid Response Nurse Chart Check     Chart check completed, abnormal VS noted , tremor present per primary RN. Bedside RN Crystal contacted, no concerns verbalized at this time, instructed to call [phone] for further concerns or assistance.    RRN did not have physical contact with patient.

## 2020-03-14 NOTE — PROGRESS NOTES
[facility]  Epilepsy Monitoring Unit  Daily Progress Note    Date: 03/14/2020  Patient Name: Ivy Salgado   MRN: [id number]   PCP: Kalia Astorga    Subjective:         HOSPITAL COURSE:    3/11->3/12: No clinical events.  3/12->3/13: one clinical event of shaking and feeling clammy at 3/12 1605.  No epileptic correlate.  3/13-  3/14: one episode of tremulessness/shaking and feeling unwell at 3/13 2359pm without EEG correlate.     PAST MEDICAL HISTORY:  Past Medical History:   Diagnosis Date    Abnormal Pap smear 2007    Abnormal Pap smear 5/26/2011    Anemia     Anxiety     Arthritis     C. difficile diarrhea     Crohn's disease     Depression 8/5/2017    Encounter for blood transfusion     Genital HSV     History of colposcopy with cervical biopsy 2007 and 7/2011 2007-LYLA I  and 7/2011- LYLA I    Hypertension     Kidney stone     Kidney stone     Melanoma     Recurrent UTI 4/3/2013    S/P ileostomy 7/9/2012    Sterilization 6/23/2012     PAST SURGICAL HISTORY:  Past Surgical History:   Procedure Laterality Date    ABDOMINAL SURGERY      APPENDECTOMY      BILATERAL SALPINGO-OOPHORECTOMY (BSO) Bilateral 5/30/2019    Procedure: SALPINGO-OOPHORECTOMY, BILATERAL;  Surgeon: Rupa German MD;  Location: [facility] OR [address] [place] [address];  Service: OB/GYN;  Laterality: Bilateral;    BLADDER SURGERY      partial cystectomy due to fistula    CKC      COLON SURGERY      COLONOSCOPY      EXCISION OF MELANOMA  07/17/2019    ILEOSTOMY      LYSIS OF ADHESIONS N/A 5/30/2019    Procedure: LYSIS, ADHESIONS;  Surgeon: Rupa German MD;  Location: [facility] OR [address] [place] [address];  Service: OB/GYN;  Laterality: N/A;    OOPHORECTOMY Right 04/16/2015    PORTACATH PLACEMENT  02/21/2017    SKIN BIOPSY      SMALL INTESTINE SURGERY      age 18 Y    TOTAL ABDOMINAL HYSTERECTOMY  04/16/2015    TOTAL COLECTOMY      TUBAL LIGATION  06/06/2012    UPPER GASTROINTESTINAL ENDOSCOPY       CURRENT MEDS:  Current  Facility-Administered Medications   Medication Dose Route Frequency Provider Last Rate Last Dose    acetaminophen tablet 650 mg  650 mg Oral Q4H PRN James Ross MD   650 mg at 03/14/20 0850    Bi-est/PROG/DHEA cream  1 g Apply Externally Daily James Ross MD   1 g at 03/14/20 0845    cephALEXin capsule 250 mg  250 mg Oral QHS James Ross MD   250 mg at 03/13/20 2151    docusate sodium capsule 100 mg  100 mg Oral BID PRN James Ross MD        dronabinoL capsule 5 mg  5 mg Oral BID AC James Ross MD   5 mg at 03/14/20 0645    gabapentin capsule 300 mg  300 mg Oral BID James Ross MD   300 mg at 03/14/20 0844    heparin, porcine (PF) 100 unit/mL injection flush 300 Units  3 mL Intra-Catheter PRN Arielle Bates MD PhD        loperamide capsule 2 mg  2 mg Oral Q3H PRN James Ross MD   2 mg at 03/14/20 0850    mirtazapine disintegrating tablet 30 mg  30 mg Oral Nightly James Ross MD   30 mg at 03/13/20 2151    promethazine tablet 25 mg  25 mg Oral Q6H PRN James Ross MD   25 mg at 03/13/20 2152    sodium chloride 0.9% flush 10 mL  10 mL Intravenous PRN James Ross MD        Testosterone gel  1 g Apply Externally Daily James Ross MD   1 g at 03/14/20 0845    valACYclovir tablet 500 mg  500 mg Oral Daily James Ross MD   500 mg at 03/14/20 0844     ALLERGIES:  Review of patient's allergies indicates:   Allergen Reactions    Azathioprine sodium Other (See Comments)     Other reaction(s): pancreatitis  Other reaction(s): pancreatitis    Methotrexate analogues Other (See Comments)     leukopenia    Stelara [ustekinumab] Other (See Comments)     Multiple infections    Zofran [ondansetron hcl (pf)] Other (See Comments)     Per patient causes prolong QT    Vancomycin analogues Hives    Morphine Itching and Other (See Comments)     Other reaction(s): Itching    Bactrim [sulfamethoxazole-trimethoprim] Palpitations     Ciprofloxacin Palpitations     FAMILY HISTORY:  Family History   Problem Relation Age of Onset    Colon cancer Father     Cancer Father         colon cancer    Liver cancer Father     Hypertension Mother     Cancer Maternal Grandfather         esophageal      Skin cancer Maternal Grandfather     Endometrial cancer Maternal Aunt     Crohn's disease Brother     Breast cancer Neg Hx     Ovarian cancer Neg Hx     Melanoma Neg Hx      SOCIAL HISTORY:  Social History     Tobacco Use    Smoking status: Never Smoker    Smokeless tobacco: Never Used   Substance Use Topics    Alcohol use: Not Currently     Alcohol/week: 0.0 standard drinks    Drug use: No     Review of Systems:  12 system review of systems is negative except as noted in HPI.      Objective:     Vitals:    03/14/20 0019 03/14/20 0329 03/14/20 0439 03/14/20 0736   BP: 128/83  123/83 125/86   BP Location:    Right arm   Patient Position:    Lying   Pulse: (!) 121 81 91 76   Resp:   18 18   Temp:   98.1 °F (36.7 °C)    TempSrc:       SpO2: 99%  95% 96%   Weight:       Height:           General: NAD, well nourished   Eyes: no tearing, discharge, no erythema   ENT: moist mucous membranes of the oral cavity, nares patent    Neck: Supple, Full range of motion  Cardiovascular: Warm and well perfused, pulses equal and symmetrical  Lungs: Normal work of breathing, normal chest wall excursions  Skin: No rash, lesions, or breakdown on exposed skin  Psychiatry: Mood and affect are appropriate   Abdomen: soft, non tender, non distended.  Colostomy in place.  Extremeties: No cyanosis, clubbing or edema.    Neurological   MENTAL STATUS: Alert and oriented to person, place, and time. Attention and concentration within normal limits. Speech without dysarthria, able to name and repeat without difficulty. Recent and remote memory within normal limits   CRANIAL NERVES: Visual fields intact. PERRL. EOMI. Facial sensation intact. Face symmetrical. Hearing grossly  intact. Full shoulder shrug bilaterally. Tongue protrudes midline   SENSORY: Sensation is intact to light touch throughout.   MOTOR: Normal bulk and tone. No pronator drift.  5/5 deltoid, biceps, triceps, interosseous, hand  bilaterally. 5/5 iliopsoas, knee extension/flexion, foot dorsi/plantarflexion bilaterally.    REFLEXES: Symmetric and 2+ throughout BUE.  CEREBELLAR/COORDINATION/GAIT: finger/nose intact b/l.  Gait deferred 2/2 seizure precautions.    Labs:     Microbiology Results (last 7 days)     Procedure Component Value Units Date/Time    Blood culture [[id number]] Collected:  03/13/20 0055    Order Status:  Completed Specimen:  Blood from Line, Subclavian, Left Updated:  03/14/20 0613     Blood Culture, Routine No Growth to date      No Growth to date    Narrative:       Drawn from Port    Blood culture [[id number]] Collected:  03/11/20 1505    Order Status:  Completed Specimen:  Blood Updated:  03/13/20 2012     Blood Culture, Routine No Growth to date      No Growth to date      No Growth to date    Narrative:       Please draw one culture from port and other from periphery    Blood culture [[id number]]     Order Status:  Sent Specimen:  Blood           Imaging:   3/12 TTE: Per report,  · Normal left ventricular systolic function. The estimated ejection fraction is 63%.  · No wall motion abnormalities.  · Normal LV diastolic function.  · Normal right ventricular systolic function.  · Normal central venous pressure (3 mmHg).       Diagnostic EEG Results:  Date: 3/11-3/14  EEG Background: 11 Hz PDR, symmetric     Activation Procedures:   HV/PS: 3/12    Seizures/Events:   Start Time: 3/12 1605 Stop Time 1608 and start 3/13 2359 and lasted for 5 minutes.  Vitals: Not recorded during event, tachycardic on EEG.  Clinical Symptoms: maintained JUAN.  Fine, asynchronous, high frequency shaking predominantly in BLE.  Subjective Symptoms: Clammy, shaking.  Reports similar character to typical event, but less  severe.  No epileptic correlate on EEG.         Assessment:      This is Ivy Salgado, 39 y.o. female presenting for event characterization.           Plan:     Complex partial epilepsy with generalization and with intractable epilepsy  Ms. Iyv Salgado is a 39 y.o. female w/ PMH significant for Crohn's, nephrolithiasis, melanoma, HTN, who presents with a chief complaint of evaluation for epilepsy.  Referred by Dr. Miller.     Based on description of events, differential could include rigors/infection (port -> biofilm? Recurrent UTIs), PNEE, epilepsy, pheochromocytoma, carcinoid syndrome, disulfiram like reaction, POTS.     She reports that drinking a beer reliably triggers her events.  The first event captured on 3/12 following HV did not have an epileptic correlate.  We will evaluate further with a typical trigger for her (beer) and check labs for a pheochromocytoma, including 24 hr urine collection.  2nd event did not having EEG correlation.      We discussed the significant stresses she faces, and she is interested in seeing a psychologist after discharge.        Plan  -Continuous EEG  -urine/serum metanephrines and catecholamines, chromogranin A  -F/u blood, urine cultures to evaluate for infectious etiology  -HOLD home clonazepam 1 mg Qdaily, ambien 10 mg QHS PRN  -Continue home gabapentin 300 mg BID  -HV/PS Qdaily  -Ativan 2 mg IV PRN for gen seizure activity >3-5 minutes duration  -Seizure, fall precautions     Anticipate discharge 3/14 after urine studies finish collection.  Outpatient referral to psychology (discuss stressors/mood) and cardiology (to evaluate for POTS or other etiology).     History of recurrent UTI (urinary tract infection)  Continue home keflex 250 mg Qdaily     Transient neurological symptoms  See epilepsy section        Appetite impaired  Continue home marinol      Joint pain  Continue home gabapentin      Herpes genitalis in women  Continue home valtrex      Generalized anxiety  disorder  Continue home remeron  HOLD home clonazepam 1 mg Qdaily PRN     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

## 2020-03-14 NOTE — PROCEDURES
Epilepsy Monitoring Unit Report    DATE OF SERVICE: 03/14/2020-03/14/2020  EEG NUMBER: [id number]  REQUESTED BY: Arielle Bates MD PhD  LOCATION OF SERVICE: [facility] METHODOLOGY      Electroencephalographic (EEG) is recorded with electrodes placed according to the International 10-20 placement system.  Thirty Two (32) channels of digital signal including the T1 and T2 electrodes are simultaneously recorded from the scalp and also including EKG, EMG  and/or eye movement monitors.  Recording band pass was 0.1 to 512 hz.  Digital video recording of the patient is simultaneously recorded with the EEG.  The patient is instructed report clinical symptoms which may occur during the recording session.  EEG and video recording is stored and archived in digital format. Activation procedures which include photic stimulation, hyperventilation and instructing patients to perform simple task are done in selected patients.       The EEG is displayed on a monitor screen and can be reformatted into different montages for evaluation.  The entire recoding is submitted for computer assisted analysis to detect spike and electrographic seizure activity.  The entire recording is visually reviewed and the times identified by computer analysis as being spikes or seizures are reviewed again.  Compresses spectral analysis (CSA) is also performed on the activity recorded from each individual channel.  This is displayed as a power display of frequencies from 0 to 30 Hz over time.   The CSA analysis is done and displayed continuously.  This is reviewed for asymmetries in power between homologous areas of the scalp and for presence of changes in power which can be seen when seizures occur.  Sections of suspected abnormalities on the CSA is then compared with the original EEG recording.                 [employer] software was also utilized in the review of this study.  This software suite analyzes the EEG recording in  multiple domains.  Coherence and rhythmicity is computed to identify EEG sections which may contain organized seizures.  Each channel undergoes analysis to detect presence of spike and sharp waves which have special and morphological characteristic of epileptic activity.  The routine EEG recording is converted from spacial into frequency domain.  This is then displayed comparing homologous areas to identify areas of significant asymmetry.  Algorithm to identify non-cortically generated artifact is used to separate eye movement, EMG and other artifact from the EEG.       Recording Times  Start on 03/14/2020 at 07:00:00 stop on 03/14/2020 at 15:59    A total of 8 hours 54 minutes of EEG/Video telemetry was recorded.        ELECTROENCEPHALOGRAM  INTERICTAL:  Background activity:   The background rhythm consists of a posterior dominant alpha rhythm with frequency of 11 Hz in addition to a mixture of alpha and beta frequencies anteriorly.  Background is continuous and symmetric.      Sleep:   Normal sleep transients including sleep spindles, K complexes, vertex waves and POSTS were seen.     Activation procedures:   Hyperventilation was not performed.  Photic stimulation was not performed.     Abnormal activity:      No epileptiform discharges, periodic discharges, lateralized rhythmic delta activity or electrographic seizures were seen.       Impression  This is a normal extended EEG tracing in the awake, drowsy, and sleep states. There were no epileptiform discharges or electrographic seizures.      Final Summary 03/11/2020 - 3/14/2020  This is a normal 3 day EEG tracing in the awake, drowsy and sleep states. Two typical patient events were captured on 03/12/2020 at 16:05 p.m and 03/13/2020 at 23:59pm that were characterized by feeling unwell with high-frequency, low-amplitude jerks of the arms and legs with no EEG correlate.  There were no epileptiform discharges or electrographic seizures during this 3 day recording.        Jamaal Dean MD  [facility]  Neurology Department

## 2020-03-14 NOTE — PROCEDURES
Epilepsy Monitoring Unit Report    DATE OF SERVICE: 03/13/2020-03/14/2020  EEG NUMBER: EMU -[id number]  REQUESTED BY: Arielle Bates PhD, MD  LOCATION OF SERVICE: [facility] METHODOLOGY      Electroencephalographic (EEG) is recorded with electrodes placed according to the International 10-20 placement system.  Thirty Two (32) channels of digital signal including the T1 and T2 electrodes are simultaneously recorded from the scalp and also including EKG, EMG  and/or eye movement monitors.  Recording band pass was 0.1 to 512 hz.  Digital video recording of the patient is simultaneously recorded with the EEG.  The patient is instructed report clinical symptoms which may occur during the recording session.  EEG and video recording is stored and archived in digital format. Activation procedures which include photic stimulation, hyperventilation and instructing patients to perform simple task are done in selected patients.       The EEG is displayed on a monitor screen and can be reformatted into different montages for evaluation.  The entire recoding is submitted for computer assisted analysis to detect spike and electrographic seizure activity.  The entire recording is visually reviewed and the times identified by computer analysis as being spikes or seizures are reviewed again.  Compresses spectral analysis (CSA) is also performed on the activity recorded from each individual channel.  This is displayed as a power display of frequencies from 0 to 30 Hz over time.   The CSA analysis is done and displayed continuously.  This is reviewed for asymmetries in power between homologous areas of the scalp and for presence of changes in power which can be seen when seizures occur.  Sections of suspected abnormalities on the CSA is then compared with the original EEG recording.                 [employer] software was also utilized in the review of this study.  This software suite analyzes the EEG recording in  multiple domains.  Coherence and rhythmicity is computed to identify EEG sections which may contain organized seizures.  Each channel undergoes analysis to detect presence of spike and sharp waves which have special and morphological characteristic of epileptic activity.  The routine EEG recording is converted from spacial into frequency domain.  This is then displayed comparing homologous areas to identify areas of significant asymmetry.  Algorithm to identify non-cortically generated artifact is used to separate eye movement, EMG and other artifact from the EEG.       Recording Times  Start on 03/13/2020 at 07:00:00 stop on 03/14/2020 at 07:00:00    A total of 24 hours of EEG/Video telemetry was recorded.        ELECTROENCEPHALOGRAM  INTERICTAL:  Background activity:   The background rhythm consists of a posterior dominant alpha rhythm with frequency of 11 Hz in addition to a mixture of alpha and beta frequencies anteriorly.  Background is continuous and symmetric.      Sleep:   Normal sleep transients including sleep spindles, K complexes, vertex waves and POSTS were seen.     Activation procedures:   Hyperventilation was not performed.  Photic stimulation was not performed.     Abnormal activity:      No epileptiform discharges, periodic discharges, lateralized rhythmic delta activity or electrographic seizures were seen.    Events: At 23:59 on 3/13/2020, the patient had her typical episode of feeling unwell,  low amplitude tremulousness/shaking that was generalized but more prominent in the lower extremities. She was able to communicated to staff during episode. There was no EEG correlation.      Impression  This is a normal 24hr video EEG tracing in the awake, drowsy, and sleep states. There were no epileptiform discharges or electrographic seizures. One typical event captured during this recording that did not have EEG correlation.       LTM Summary 03/11/2020 - TBD:  Patient events/Seizures:     03/12/2020  at 16:05 p.m and 03/13/2020 at 23:59pm :  Two typical patient episodes of feeling unwell with high-frequency, low-amplitude jerks of the arms and legs with no EEG correlate.  Interictal findings:  None  Other notable abnormalities: none     Jamaal Dean MD  [facility]  Neurology Department

## 2020-03-14 NOTE — DISCHARGE SUMMARY
[facility]  Neurology  Discharge Summary      Patient Name: Ivy Salgado  MRN: [id number]  Admission Date: 3/11/2020  Hospital Length of Stay: 3 days  Discharge Date and Time:  03/14/2020 6:41 PM  Attending Physician: No att. providers found  Discharging Provider: Jamaal Dean MD  Primary Care Physician: Kalia Astorga MD    HPI:     Ms. Ivy Salgado is a 39 y.o. female w/ PMH significant for Crohn's, nephrolithiasis, melanoma, HTN, who presents with a chief complaint of evaluation for epilepsy.  Referred by Dr. Miller.     She reports onset of events in 2018.  She describes the events as beginning with a flushing sensation for seconds-minutes, followed by generalized shaking (she demonstrates a moderate amplitude, sometimes dysynchronous, varying frequency shaking) ongoing for 5-10 minutes.  This is followed by a period of confusion (lasting minutes) and drowsiness (duration of hours).  She reports events triggered by stress.       She is currently on gabapentin 300 mg BID (pain), clonazepam 1 mg PRN Qdaily (anxiety), and ambien 10 mg QHS PRN (3x/week).       Further, her history has been complicated by urosepsis, repeated ovarian cysts, port placement.     Denies alcohol, recreational drug use, cigarette use.     Seizure Type: unknown  Seizure Etiology: unknown  Home AEDs: gabapentin (pain), clonazepam (anxiety)  Last AEDs Taken Prior to Admission: gabapentin 300 mg 3/10 PM     The patient is not accompanied by family who contribute to the history. This patient has 1 types of seizure as described below. The patient reports having seizures for years The patient reports to have fluctuating seizure control. The seizure frequency is fluctuating, but may reach 2x/month. The last seizure was 2 weeks prior to EMU presentation . The patient does not report side effects from seizure medication.     Seizure Type 1:   Seizure Description: generalized shaking (see HPI), followed by  confusion and drowsiness.  Aura: flushing  Associated Symptoms:  none  Seizure Frequency: 0-2x/month  Last seizure: 2 weeks prior to EMU presentation     Handedness: right  Seizure Onset Age: 37   Seizure/ Epilepsy Risk Factors: none  Birth/Developmental History: normal birth history and Normal developmental history  Seizure Triggers/ Provoking Features: stress  Previous Seizure Medications: gabapentin, clonazepam  Recent Med Changes: None pertinent  Other Treatments: None  Prior Episodes of Status:  None  Psychiatric/Behavioral Comorbitidies: anxiety  Surgical Candidacy: NA     Prior Studies:  EEG : 1/16/2019 - unremarkable  vEEG/ EMU evaluation: 3/2020  MRI of brain: 11/13/19 - scattered T2 hyperintensities  AED levels:  None  CT/CTA Scan:  None  PET Scan: None  Neuropsychological Evaluation: None  DEXA Scan: None  Other studies: None    * No surgery found *     Indwelling Lines/Drains at time of discharge:   Lines/Drains/Airways     Central Venous Catheter Line                 Port A Cath Single Lumen 02/01/17 0800 left subclavian 1137 days          Drain                 Ileostomy RLQ -- days         Ileostomy 06/16/12 0000 RLQ 2828 days              Hospital Course:     3/11->3/12: No clinical events.  3/12->3/13: one clinical event of shaking and feeling clammy at 3/12 1605.  No epileptic correlate.  3/13-  3/14: one episode of tremulessness/shaking and feeling unwell at 3/13 2359pm without EEG correlate.        Complex partial epilepsy with generalization and with intractable epilepsy  Ms. Ivy Salgado is a 39 y.o. female w/ PMH significant for Crohn's, nephrolithiasis, melanoma, HTN, who presents with a chief complaint of evaluation for epilepsy.  Referred by Dr. Miller.     Based on description of events, differential could include rigors/infection (port -> biofilm? Recurrent UTIs), PNEE, epilepsy, pheochromocytoma, carcinoid syndrome, disulfiram like reaction, POTS.     She reports that drinking a beer  reliably triggers her events.  The first event captured on 3/12 following HV did not have an epileptic correlate.  She felt that beer could also trigger and episode. She drink beer and another episode was captured that did not have EEG correlate. We checked labs for a pheochromocytoma, including 24 hr urine collection for which she will followup with endocrine as outpatient.      We discussed the significant stresses she faces, and she is interested in seeing a psychologist after discharge.        Plan  -Continuous EEG  -urine/serum metanephrines and catecholamines, chromogranin A  -F/u blood, urine cultures to evaluate for infectious etiology  -HOLD home clonazepam 1 mg Qdaily, ambien 10 mg QHS PRN; continue at discharge  -Continue home gabapentin 300 mg BID  -HV/PS Qdaily  -Ativan 2 mg IV PRN for gen seizure activity >3-5 minutes duration  -Seizure, fall precautions     Anticipate discharge 3/14 after urine studies finish collection.  Outpatient referral to psychology (discuss stressors/mood) and cardiology (to evaluate for POTS or other etiology).     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    Vitals:    03/14/20 0736 03/14/20 1112 03/14/20 1200 03/14/20 1500   BP: 125/86  126/87 120/76   BP Location: Right arm  Right arm Right arm   Patient Position: Lying  Lying Lying   Pulse: 76 71 95 78   Resp: 18  18 18   Temp: 97.8 °F (36.6 °C)  98 °F (36.7 °C) 97.6 °F (36.4 °C)   TempSrc: Oral      SpO2: 96%  96% (!) 94%   Weight:       Height:          General: NAD, well nourished   Eyes: no tearing, discharge, no erythema   ENT: moist mucous membranes of the oral cavity, nares patent    Neck: Supple, Full range of motion  Cardiovascular: Warm and well perfused, pulses equal and symmetrical  Lungs: Normal work of breathing, normal chest wall excursions  Skin: No rash, lesions, or breakdown on exposed skin.  Port site clean/dry, no erythema nor local fever.  Psychiatry: Mood and affect are appropriate   Abdomen: soft, non tender, non distended  Extremeties: No cyanosis, clubbing or edema.     Neurological   MENTAL STATUS: Alert and oriented to person, place, and time. Attention and concentration within normal limits. Speech without dysarthria, able to name and repeat without difficulty. Recent and remote memory within normal limits   CRANIAL NERVES: Visual fields intact. PERRL. EOMI. Facial sensation intact. Face symmetrical. Hearing grossly intact. Full shoulder shrug bilaterally. Tongue protrudes midline   SENSORY: Sensation is intact to light touch throughout.   MOTOR: Normal bulk and tone. No pronator drift.  5/5 deltoid, biceps, triceps, interosseous, hand  bilaterally. 5/5 iliopsoas, knee extension/flexion, foot dorsi/plantarflexion bilaterally.    REFLEXES: Symmetric and 2+ throughout. No clonus b/l.  CEREBELLAR/COORDINATION/GAIT: finger/nose intact b/l.  Gait deferred 2/2 seizure precautions.  Consults:   Consults (From admission, onward)        Status Ordering Provider     Inpatient consult to General Surgery  Once     Provider:  (Not yet assigned)    Completed MEEK DUENAS     Inpatient consult to PICC team ([facility])  Once     Provider:  (Not yet assigned)    Completed IZZY WILKINS          Significant Labs: All pertinent lab results from the past 24 hours have been reviewed.      Pending Diagnostic Studies:     None        Final Active Diagnoses:    Diagnosis Date Noted POA    PRINCIPAL PROBLEM:  Complex partial epilepsy with  generalization and with intractable epilepsy [G40.219] 03/11/2020 Yes    Seizures [R56.9]  Yes    History of recurrent UTI (urinary tract infection) [Z87.440] 03/11/2020 Yes    Transient neurological symptoms [R29.818] 01/13/2019 Yes    Appetite impaired [R63.0] 11/20/2017 Yes    Joint pain [M25.50] 02/29/2016 Yes    Herpes genitalis in women [A60.09] 12/23/2014 Yes    Generalized anxiety disorder [F41.1] 09/18/2013 Yes    S/P ileostomy [Z93.2] 07/09/2012 Not Applicable     Chronic      Problems Resolved During this Admission:         Discharged Condition: good    Disposition: Home or Self Care    Follow Up:  Follow-up Information     Kalia Astorga MD.    Specialty:  Internal Medicine  Why:  Outpatient Services  Contact information:  [address]  [place] [address]  [phone]                 Patient Instructions:     Seizure restrictions are but not limited to: no driving for six months after last seizure; avoid swimming, high altitude activities, operating heavy machinery, bathing unattended, or engaging in activities in where a seizure will cause harm to self or others.    Medications:  Reconciled Home Medications:      Medication List      CONTINUE taking these medications    cephALEXin 250 MG capsule  Commonly known as:  KEFLEX  TAKE 1 CAPSULE(250 MG) BY MOUTH EVERY DAY     diphenoxylate-atropine 2.5-0.025 mg 2.5-0.025 mg per tablet  Commonly known as:  LOMOTIL  TAKE 1 TABLET BY MOUTH FOUR TIMES DAILY AS NEEDED FOR DIARRHEA     dronabinoL 5 MG capsule  Commonly known as:  MARINOL  TAKE 1 CAPSULE BY MOUTH TWICE DAILY BEFORE MEALS     famotidine 20 MG tablet  Commonly known as:  PEPCID  Take 20 mg by mouth 2 (two) times daily.     fluconazole 100 MG tablet  Commonly known as:  DIFLUCAN  TAKE 1 TABLET BY MOUTH EVERY DAY     food supplemt, lactose-reduced 0.04-1.05 gram-kcal/mL Liqd  Commonly known as:  Boost Breeze NutritionaL  Use 3 times per day     gabapentin 300 MG capsule  Commonly  known as:  NEURONTIN  Take 1 capsule (300 mg total) by mouth once daily. After two weeks, take one twice daily     loperamide 2 mg capsule  Commonly known as:  IMODIUM  Take 2 mg by mouth daily as needed for Diarrhea.     mirtazapine 30 MG disintegrating tablet  Commonly known as:  REMERON SOL-TAB  Take 1 tablet (30 mg total) by mouth nightly.     ONE DAILY MULTIVITAMIN per tablet  Generic drug:  multivitamin  Take 1 tablet by mouth once daily.     potassium citrate 15 mEq Tbsr  Commonly known as:  UROCIT-K 15  Take 2 tablets by mouth 2 (two) times daily.     promethazine 25 MG tablet  Commonly known as:  PHENERGAN  TAKE 1 TABLET(25 MG) BY MOUTH EVERY 6 HOURS AS NEEDED     valACYclovir 500 MG tablet  Commonly known as:  VALTREX  TAKE 1 TABLET BY MOUTH EVERY DAY     vedolizumab 300 mg Solr injection  Commonly known as:  ENTYVIO  Inject 300 mg into the vein.     zolpidem 10 mg Tab  Commonly known as:  AMBIEN  TAKE 1 TABLET BY MOUTH EVERY NIGHT AT BEDTIME        STOP taking these medications    amoxicillin 500 MG capsule  Commonly known as:  AMOXIL        ASK your doctor about these medications    clonazePAM 1 MG tablet  Commonly known as:  KLONOPIN  Take 1 tablet (1 mg total) by mouth 2 (two) times daily. as needed for anxiety.          Time spent on the discharge of patient: <30 minutes    Jamaal Dean MD  Neurology  [facility]

## 2020-03-14 NOTE — DISCHARGE INSTRUCTIONS
[email or web address] is a good source to find a local psychologist.     Seizure restrictions are but not limited to: no driving for six months after last seizure; avoid swimming, high altitude activities, operating heavy machinery, bathing unattended, or engaging in activities in where a seizure will cause harm to self or others.

## 2020-03-16 ENCOUNTER — PATIENT MESSAGE (OUTPATIENT)
Dept: UROLOGY | Facility: CLINIC | Age: 40
End: 2020-03-16

## 2020-03-16 LAB — BACTERIA BLD CULT: NORMAL

## 2020-03-16 NOTE — PLAN OF CARE
03/16/20 1017   Final Note   Assessment Type Final Discharge Note   Anticipated Discharge Disposition Home   Right Care Referral Info   Post Acute Recommendation No Care   Post-Acute Status   Post-Acute Authorization Other   Other Status No Post-Acute Service Needs   Discharge Delays None known at this time     Patient medically ready for discharge to home. Patient was instructed by discharging provider to schedule follow-ups.     Future Appointments   Date Time Provider Department Center   4/3/2020  9:45 AM Iris Simon MD [facility] DERM [facility]   4/22/2020  9:45 AM Phu Obrien MD [facility] UROLOGY [facility] Ervin Munoz RN  Case Management  Ext: [phone]  03/16/2020  10:17 AM

## 2020-03-17 DIAGNOSIS — F41.9 ANXIETY: ICD-10-CM

## 2020-03-18 DIAGNOSIS — Z79.899 ENCOUNTER FOR LONG-TERM (CURRENT) USE OF HIGH-RISK MEDICATION: ICD-10-CM

## 2020-03-18 DIAGNOSIS — K50.819 CROHN'S DISEASE OF BOTH SMALL AND LARGE INTESTINE WITH COMPLICATION: ICD-10-CM

## 2020-03-18 LAB — BACTERIA BLD CULT: NORMAL

## 2020-03-18 RX ORDER — ZOLPIDEM TARTRATE 10 MG/1
TABLET ORAL
Qty: 30 TABLET | Refills: 1 | Status: SHIPPED | OUTPATIENT
Start: 2020-03-18 | End: 2020-05-19

## 2020-03-18 RX ORDER — PROMETHAZINE HYDROCHLORIDE 25 MG/1
TABLET ORAL
Qty: 30 TABLET | Refills: 0 | Status: SHIPPED | OUTPATIENT
Start: 2020-03-18 | End: 2020-05-04 | Stop reason: SDUPTHER

## 2020-03-18 RX ORDER — DRONABINOL 5 MG/1
5 CAPSULE ORAL
Qty: 60 CAPSULE | Refills: 0 | Status: SHIPPED | OUTPATIENT
Start: 2020-03-18 | End: 2020-04-16 | Stop reason: SDUPTHER

## 2020-03-21 LAB
COLLECT DURATION TIME SPEC: 24 HR
CREAT 24H UR-MRATE: 850 MG/D (ref 700–1600)
CREAT UR-MCNC: 103 MG/DL
METANEPH 24H UR-MCNC: 169 UG/L
METANEPH 24H UR-MRATE: 139 UG/D (ref 36–229)
METANEPH+NORMETANEPH UR-IMP: NORMAL
METANEPH/CREAT 24H UR: 164 UG/G CRT (ref 0–300)
NORMETANEPHRINE 24H UR-MCNC: 273 UG/L
NORMETANEPHRINE 24H UR-MRATE: 225 UG/D (ref 95–650)
NORMETANEPHRINE/CREAT 24H UR: 265 UG/G CRT (ref 0–400)
SPECIMEN VOL ?TM UR: 825 ML

## 2020-03-23 ENCOUNTER — TELEPHONE (OUTPATIENT)
Dept: GASTROENTEROLOGY | Facility: CLINIC | Age: 40
End: 2020-03-23

## 2020-03-23 NOTE — TELEPHONE ENCOUNTER
Spoke with patient regarding a video visit with Dr.James Ross to discuss her Crohn's and any concerns she may have with Covid-19.  Patient stated she felt like she was ok at this time.   She will call back if she feels a visit is necessary.

## 2020-03-24 DIAGNOSIS — K50.819 CROHN'S DISEASE OF SMALL AND LARGE INTESTINES WITH COMPLICATION: ICD-10-CM

## 2020-03-24 RX ORDER — DIPHENOXYLATE HYDROCHLORIDE AND ATROPINE SULFATE 2.5; .025 MG/1; MG/1
TABLET ORAL
Qty: 100 TABLET | Refills: 0 | Status: SHIPPED | OUTPATIENT
Start: 2020-03-24 | End: 2020-05-25

## 2020-03-27 ENCOUNTER — PATIENT MESSAGE (OUTPATIENT)
Dept: DERMATOLOGY | Facility: CLINIC | Age: 40
End: 2020-03-27

## 2020-03-30 ENCOUNTER — PATIENT MESSAGE (OUTPATIENT)
Dept: INTERNAL MEDICINE | Facility: CLINIC | Age: 40
End: 2020-03-30

## 2020-03-30 DIAGNOSIS — F41.1 GENERALIZED ANXIETY DISORDER: Primary | ICD-10-CM

## 2020-03-30 RX ORDER — CLONAZEPAM 1 MG/1
1.5 TABLET ORAL 2 TIMES DAILY
Qty: 90 TABLET | Refills: 0 | Status: SHIPPED | OUTPATIENT
Start: 2020-03-30 | End: 2020-04-30

## 2020-04-01 ENCOUNTER — PATIENT MESSAGE (OUTPATIENT)
Dept: INFUSION THERAPY | Facility: OTHER | Age: 40
End: 2020-04-01

## 2020-04-13 ENCOUNTER — PATIENT MESSAGE (OUTPATIENT)
Dept: ENDOCRINOLOGY | Facility: CLINIC | Age: 40
End: 2020-04-13

## 2020-04-15 ENCOUNTER — PATIENT MESSAGE (OUTPATIENT)
Dept: DERMATOLOGY | Facility: CLINIC | Age: 40
End: 2020-04-15

## 2020-04-16 DIAGNOSIS — Z79.899 ENCOUNTER FOR LONG-TERM (CURRENT) USE OF HIGH-RISK MEDICATION: ICD-10-CM

## 2020-04-16 DIAGNOSIS — K50.819 CROHN'S DISEASE OF BOTH SMALL AND LARGE INTESTINE WITH COMPLICATION: ICD-10-CM

## 2020-04-20 RX ORDER — DRONABINOL 5 MG/1
5 CAPSULE ORAL
Qty: 60 CAPSULE | Refills: 0 | Status: SHIPPED | OUTPATIENT
Start: 2020-04-20 | End: 2020-05-22 | Stop reason: SDUPTHER

## 2020-04-20 NOTE — HPI
"The patient is a 37 y.o. female with hx of:Crohn's disease, kidney stone, anxiety, HTN, and genital HSV  that presents to the ED for evaluation of intermittent RLQ abdominal pain radiating to her back since 3:00 AM. She reports vomiting. Denies fever or chills. Had TAC with end ileostomy for Crohns 6/12/12 with long hospital stay.  Sees Dr. Ross for Crohns.  Had SHELLIE by Dr. Morales 4/16/15 with findings of  Distorted pelvic anatomy with adhesions of omentum and bladder to anterior abdominal wall, right tube and ovary not visualized.  A separate entities, but adhered to the right pelvic sidewall with uterus pulling towards the right side.  The left adnexa could not be identified or palpated and adhesions of posterior uterine wall and posterior cervix. She tells me she has had intermittent vomiting with almost constant nausea for almost a year, "ever since I have been started on Stelera", has been receiving IVF 2-3/week.  Last MRE 4/27/17Active Inflammatory bowel disease without luminal narrowing, last ileoscope done 8/18/17:   Scope advanced to 15-20 cm proximal to the stoma; appeared normal. Unable to advance further due to tight angulation of intestines. iopsies were taken with a cold forceps for histology.(no active colitis),  There was evidence of a widely patent end ileostomy found in the ileostomy. This was characterized by healthy appearing mucosa. Today she says ostomy output has decreased and is a thin brown drainage, says she realluy has not been eating over the last few days.   Patient states she has been having flare ups since February after being on stelara. She reports kidney stones on her left side. No further concerns or complaints at this time.  " [Negative] : Genitourinary

## 2020-04-21 DIAGNOSIS — Z01.84 ANTIBODY RESPONSE EXAMINATION: ICD-10-CM

## 2020-04-24 ENCOUNTER — LAB VISIT (OUTPATIENT)
Dept: LAB | Facility: HOSPITAL | Age: 40
End: 2020-04-24
Attending: INTERNAL MEDICINE
Payer: MEDICARE

## 2020-04-24 ENCOUNTER — TELEPHONE (OUTPATIENT)
Dept: UROLOGY | Facility: CLINIC | Age: 40
End: 2020-04-24

## 2020-04-24 DIAGNOSIS — Z01.84 ANTIBODY RESPONSE EXAMINATION: ICD-10-CM

## 2020-04-24 LAB — SARS-COV-2 IGG SERPL QL IA: NEGATIVE

## 2020-04-24 PROCEDURE — 36415 COLL VENOUS BLD VENIPUNCTURE: CPT

## 2020-04-24 PROCEDURE — 86769 SARS-COV-2 COVID-19 ANTIBODY: CPT

## 2020-04-30 DIAGNOSIS — F41.1 GENERALIZED ANXIETY DISORDER: ICD-10-CM

## 2020-04-30 RX ORDER — CLONAZEPAM 1 MG/1
TABLET ORAL
Qty: 90 TABLET | Refills: 0 | Status: SHIPPED | OUTPATIENT
Start: 2020-04-30 | End: 2020-05-29

## 2020-05-04 ENCOUNTER — PATIENT MESSAGE (OUTPATIENT)
Dept: GASTROENTEROLOGY | Facility: CLINIC | Age: 40
End: 2020-05-04

## 2020-05-04 ENCOUNTER — PATIENT MESSAGE (OUTPATIENT)
Dept: OBSTETRICS AND GYNECOLOGY | Facility: CLINIC | Age: 40
End: 2020-05-04

## 2020-05-04 RX ORDER — PROMETHAZINE HYDROCHLORIDE 25 MG/1
TABLET ORAL
Qty: 30 TABLET | Refills: 0 | Status: SHIPPED | OUTPATIENT
Start: 2020-05-04 | End: 2020-06-10 | Stop reason: SDUPTHER

## 2020-05-05 DIAGNOSIS — K50.019 CROHN'S DISEASE OF SMALL INTESTINE WITH COMPLICATION: Primary | Chronic | ICD-10-CM

## 2020-05-11 ENCOUNTER — PATIENT MESSAGE (OUTPATIENT)
Dept: OBSTETRICS AND GYNECOLOGY | Facility: CLINIC | Age: 40
End: 2020-05-11

## 2020-05-11 ENCOUNTER — PATIENT MESSAGE (OUTPATIENT)
Dept: GASTROENTEROLOGY | Facility: CLINIC | Age: 40
End: 2020-05-11

## 2020-05-12 ENCOUNTER — OFFICE VISIT (OUTPATIENT)
Dept: OBSTETRICS AND GYNECOLOGY | Facility: CLINIC | Age: 40
End: 2020-05-12
Payer: MEDICARE

## 2020-05-12 ENCOUNTER — PATIENT MESSAGE (OUTPATIENT)
Dept: OBSTETRICS AND GYNECOLOGY | Facility: CLINIC | Age: 40
End: 2020-05-12

## 2020-05-12 ENCOUNTER — TELEPHONE (OUTPATIENT)
Dept: UROLOGY | Facility: CLINIC | Age: 40
End: 2020-05-12

## 2020-05-12 ENCOUNTER — HOSPITAL ENCOUNTER (OUTPATIENT)
Dept: RADIOLOGY | Facility: HOSPITAL | Age: 40
Discharge: HOME | End: 2020-05-12
Attending: SPECIALIST
Payer: MEDICARE

## 2020-05-12 VITALS
HEIGHT: 61 IN | WEIGHT: 125.88 LBS | BODY MASS INDEX: 23.77 KG/M2 | DIASTOLIC BLOOD PRESSURE: 66 MMHG | SYSTOLIC BLOOD PRESSURE: 126 MMHG

## 2020-05-12 DIAGNOSIS — R10.2 PELVIC PAIN: Primary | ICD-10-CM

## 2020-05-12 DIAGNOSIS — N83.201 CYST OF RIGHT OVARY: ICD-10-CM

## 2020-05-12 DIAGNOSIS — R10.2 PELVIC PAIN: ICD-10-CM

## 2020-05-12 PROCEDURE — 76830 US PELVIS COMP WITH TRANSVAG NON-OB (XPD): ICD-10-PCS | Mod: 26,,, | Performed by: RADIOLOGY

## 2020-05-12 PROCEDURE — 99999 PR PBB SHADOW E&M-EST. PATIENT-LVL IV: ICD-10-PCS | Mod: PBBFAC,,, | Performed by: SPECIALIST

## 2020-05-12 PROCEDURE — 76856 US PELVIS COMP WITH TRANSVAG NON-OB (XPD): ICD-10-PCS | Mod: 26,,, | Performed by: RADIOLOGY

## 2020-05-12 PROCEDURE — 99999 PR PBB SHADOW E&M-EST. PATIENT-LVL IV: CPT | Mod: PBBFAC,,, | Performed by: SPECIALIST

## 2020-05-12 PROCEDURE — 99214 OFFICE O/P EST MOD 30 MIN: CPT | Mod: PBBFAC,PN,25 | Performed by: SPECIALIST

## 2020-05-12 PROCEDURE — 99213 OFFICE O/P EST LOW 20 MIN: CPT | Mod: S$PBB,,, | Performed by: SPECIALIST

## 2020-05-12 PROCEDURE — 76830 TRANSVAGINAL US NON-OB: CPT | Mod: 26,,, | Performed by: RADIOLOGY

## 2020-05-12 PROCEDURE — 99213 PR OFFICE/OUTPT VISIT, EST, LEVL III, 20-29 MIN: ICD-10-PCS | Mod: S$PBB,,, | Performed by: SPECIALIST

## 2020-05-12 PROCEDURE — 76830 TRANSVAGINAL US NON-OB: CPT | Mod: TC,PN

## 2020-05-12 PROCEDURE — 76856 US EXAM PELVIC COMPLETE: CPT | Mod: 26,,, | Performed by: RADIOLOGY

## 2020-05-12 RX ORDER — TRAMADOL HYDROCHLORIDE 50 MG/1
50 TABLET ORAL EVERY 6 HOURS PRN
Qty: 10 TABLET | Refills: 0 | Status: SHIPPED | OUTPATIENT
Start: 2020-05-12 | End: 2020-06-24 | Stop reason: SDUPTHER

## 2020-05-12 NOTE — PROGRESS NOTES
38 yo WF with H/O right ovarian remnant returns with c/o right sided pelvic pain approx 2 weeks.  Past Medical History:   Diagnosis Date    Abnormal Pap smear 2007    Abnormal Pap smear 5/26/2011    Anemia     Anxiety     Arthritis     C. difficile diarrhea     Crohn's disease     Depression 8/5/2017    Encounter for blood transfusion     Genital HSV     History of colposcopy with cervical biopsy 2007 and 7/2011 2007-LYLA I  and 7/2011- LYLA I    Hypertension     Kidney stone     Kidney stone     Melanoma     Recurrent UTI 4/3/2013    S/P ileostomy 7/9/2012    Sterilization 6/23/2012       Past Surgical History:   Procedure Laterality Date    ABDOMINAL SURGERY      APPENDECTOMY      BILATERAL SALPINGO-OOPHORECTOMY (BSO) Bilateral 5/30/2019    Procedure: SALPINGO-OOPHORECTOMY, BILATERAL;  Surgeon: Rupa German MD;  Location: [facility] OR [address] [place] [address];  Service: OB/GYN;  Laterality: Bilateral;    BLADDER SURGERY      partial cystectomy due to fistula    CKC      COLON SURGERY      COLONOSCOPY      EXCISION OF MELANOMA  07/17/2019    ILEOSTOMY      LYSIS OF ADHESIONS N/A 5/30/2019    Procedure: LYSIS, ADHESIONS;  Surgeon: Rupa German MD;  Location: [facility] OR [address] [place] [address];  Service: OB/GYN;  Laterality: N/A;    OOPHORECTOMY Right 04/16/2015    PORTACATH PLACEMENT  02/21/2017    SKIN BIOPSY      SMALL INTESTINE SURGERY      age 18 Y    TOTAL ABDOMINAL HYSTERECTOMY  04/16/2015    TOTAL COLECTOMY      TUBAL LIGATION  06/06/2012    UPPER GASTROINTESTINAL ENDOSCOPY         Family History   Problem Relation Age of Onset    Colon cancer Father     Cancer Father         colon cancer    Liver cancer Father     Hypertension Mother     Cancer Maternal Grandfather         esophageal      Skin cancer Maternal Grandfather     Endometrial cancer Maternal Aunt     Crohn's disease Brother     Breast cancer Neg Hx     Ovarian cancer Neg Hx     Melanoma Neg Hx        Social History     Socioeconomic  History    Marital status: Single     Spouse name: Not on file    Number of children: Not on file    Years of education: Not on file    Highest education level: Not on file   Occupational History     Employer: [employer]   Social Needs    Financial resource strain: Not on file    Food insecurity:     Worry: Not on file     Inability: Not on file    Transportation needs:     Medical: Not on file     Non-medical: Not on file   Tobacco Use    Smoking status: Never Smoker    Smokeless tobacco: Never Used   Substance and Sexual Activity    Alcohol use: Not Currently     Alcohol/week: 0.0 standard drinks    Drug use: No    Sexual activity: Yes     Partners: Male     Birth control/protection: Surgical     Comment: HYST   Lifestyle    Physical activity:     Days per week: Not on file     Minutes per session: Not on file    Stress: Not on file   Relationships    Social connections:     Talks on phone: Not on file     Gets together: Not on file     Attends Restorationism service: Not on file     Active member of club or organization: Not on file     Attends meetings of clubs or organizations: Not on file     Relationship status: Not on file   Other Topics Concern    Are you pregnant or think you may be? No    Breast-feeding No   Social History Narrative    Not on file       Current Outpatient Medications   Medication Sig Dispense Refill    cephALEXin (KEFLEX) 250 MG capsule TAKE 1 CAPSULE(250 MG) BY MOUTH EVERY DAY 30 capsule 11    clonazePAM (KLONOPIN) 1 MG tablet TAKE ONE AND A HALF TABLETS BY MOUTH TWICE DAILY AS NEEDED FOR ANXIETY 90 tablet 0    diphenoxylate-atropine 2.5-0.025 mg (LOMOTIL) 2.5-0.025 mg per tablet TAKE 1 TABLET BY MOUTH FOUR TIMES DAILY AS NEEDED FOR DIARRHEA 100 tablet 0    dronabinoL (MARINOL) 5 MG capsule Take 1 capsule (5 mg total) by mouth 2 (two) times daily before meals. 60 capsule 0    famotidine (PEPCID) 20 MG tablet Take 20 mg by mouth 2 (two) times daily.       fluconazole (DIFLUCAN) 100 MG tablet TAKE 1 TABLET BY MOUTH EVERY DAY 7 tablet 0    food supplemt, lactose-reduced (BOOST BREEZE NUTRITIONAL) 0.04-1.05 gram-kcal/mL Liqd Use 3 times per day 6399 mL 5    gabapentin (NEURONTIN) 300 MG capsule Take 1 capsule (300 mg total) by mouth once daily. After two weeks, take one twice daily 60 capsule 11    loperamide (IMODIUM) 2 mg capsule Take 2 mg by mouth daily as needed for Diarrhea.      mirtazapine (REMERON SOL-TAB) 30 MG disintegrating tablet Take 1 tablet (30 mg total) by mouth nightly. 90 tablet 3    multivitamin (ONE DAILY MULTIVITAMIN) per tablet Take 1 tablet by mouth once daily.      promethazine (PHENERGAN) 25 MG tablet TAKE 1 TABLET(25 MG) BY MOUTH EVERY 6 HOURS AS NEEDED 30 tablet 0    valACYclovir (VALTREX) 500 MG tablet TAKE 1 TABLET BY MOUTH EVERY DAY 90 tablet 2    vedolizumab (ENTYVIO) 300 mg SolR injection Inject 300 mg into the vein.      zolpidem (AMBIEN) 10 mg Tab TAKE 1 TABLET BY MOUTH EVERY NIGHT AT BEDTIME 30 tablet 1    potassium citrate (UROCIT-K 15) 15 mEq TbSR Take 2 tablets by mouth 2 (two) times daily. 360 tablet 3     No current facility-administered medications for this visit.        Review of patient's allergies indicates:   Allergen Reactions    Azathioprine sodium Other (See Comments)     Other reaction(s): pancreatitis  Other reaction(s): pancreatitis    Methotrexate analogues Other (See Comments)     leukopenia    Stelara [ustekinumab] Other (See Comments)     Multiple infections    Zofran [ondansetron hcl (pf)] Other (See Comments)     Per patient causes prolong QT    Vancomycin analogues Hives    Morphine Itching and Other (See Comments)     Other reaction(s): Itching    Bactrim [sulfamethoxazole-trimethoprim] Palpitations    Ciprofloxacin Palpitations       Review of System:   General: no chills, fever, night sweats, weight gain or weight loss  Psychological: no depression or suicidal ideation  Breasts: no new or  changing breast lumps, nipple discharge or masses.  Respiratory: no cough, shortness of breath, or wheezing  Cardiovascular: no chest pain or dyspnea on exertion  Gastrointestinal: no abdominal pain, change in bowel habits, or black or bloody stools  Genito-Urinary: no incontinence, urinary frequency/urgency or vulvar/vaginal symptoms, or abnormal vaginal bleeding.  Musculoskeletal: no gait disturbance or muscular weakness    PE:   APPEARANCE: Well nourished, well developed, in no acute distress.  NECK: Neck symmetric without masses or thyromegaly.  NODES: No inguinal lymph node enlargement.  ABDOMEN: Soft. No tenderness or masses. No hepatosplenomegaly. No hernias.  BREASTS: deferred  PELVIC: Normal external female genitalia without lesions. Normal hair distribution. Adequate perineal body, normal urethral meatus. Vagina moist and well rugated without lesions or discharge. No significant cystocele or rectocele. Uterus and cervix surgically absent. Bimanual exam revealed tenderness and fullness right adnexa.    I discussed possible enlargement, simple vs complex cyts, entrapememt    Will obtain Pelvic u/s as reassess  Answered all questions and will follow

## 2020-05-15 ENCOUNTER — PATIENT MESSAGE (OUTPATIENT)
Dept: GASTROENTEROLOGY | Facility: CLINIC | Age: 40
End: 2020-05-15

## 2020-05-16 DIAGNOSIS — F41.9 ANXIETY: ICD-10-CM

## 2020-05-19 RX ORDER — ZOLPIDEM TARTRATE 10 MG/1
TABLET ORAL
Qty: 30 TABLET | Refills: 0 | Status: SHIPPED | OUTPATIENT
Start: 2020-05-19 | End: 2020-06-19 | Stop reason: SDUPTHER

## 2020-05-20 ENCOUNTER — PATIENT MESSAGE (OUTPATIENT)
Dept: GASTROENTEROLOGY | Facility: CLINIC | Age: 40
End: 2020-05-20

## 2020-05-20 DIAGNOSIS — K50.819 CROHN'S DISEASE OF SMALL AND LARGE INTESTINES WITH COMPLICATION: ICD-10-CM

## 2020-05-20 DIAGNOSIS — B37.2 SKIN YEAST INFECTION: ICD-10-CM

## 2020-05-20 RX ORDER — FLUCONAZOLE 100 MG/1
TABLET ORAL
Qty: 7 TABLET | Refills: 0 | Status: SHIPPED | OUTPATIENT
Start: 2020-05-20 | End: 2020-07-31

## 2020-05-22 DIAGNOSIS — Z79.899 ENCOUNTER FOR LONG-TERM (CURRENT) USE OF HIGH-RISK MEDICATION: ICD-10-CM

## 2020-05-22 DIAGNOSIS — K50.819 CROHN'S DISEASE OF BOTH SMALL AND LARGE INTESTINE WITH COMPLICATION: ICD-10-CM

## 2020-05-22 DIAGNOSIS — K50.819 CROHN'S DISEASE OF SMALL AND LARGE INTESTINES WITH COMPLICATION: ICD-10-CM

## 2020-05-25 ENCOUNTER — PATIENT MESSAGE (OUTPATIENT)
Dept: DERMATOLOGY | Facility: CLINIC | Age: 40
End: 2020-05-25

## 2020-05-25 DIAGNOSIS — K50.819 CROHN'S DISEASE OF SMALL AND LARGE INTESTINES WITH COMPLICATION: ICD-10-CM

## 2020-05-25 DIAGNOSIS — Z79.899 ENCOUNTER FOR LONG-TERM (CURRENT) USE OF HIGH-RISK MEDICATION: ICD-10-CM

## 2020-05-25 DIAGNOSIS — K50.819 CROHN'S DISEASE OF BOTH SMALL AND LARGE INTESTINE WITH COMPLICATION: ICD-10-CM

## 2020-05-25 RX ORDER — DIPHENOXYLATE HYDROCHLORIDE AND ATROPINE SULFATE 2.5; .025 MG/1; MG/1
TABLET ORAL
Qty: 100 TABLET | Refills: 1 | Status: SHIPPED | OUTPATIENT
Start: 2020-05-25 | End: 2020-05-25 | Stop reason: SDUPTHER

## 2020-05-25 RX ORDER — DIPHENOXYLATE HYDROCHLORIDE AND ATROPINE SULFATE 2.5; .025 MG/1; MG/1
1 TABLET ORAL 4 TIMES DAILY PRN
Qty: 100 TABLET | Refills: 0 | Status: SHIPPED | OUTPATIENT
Start: 2020-05-25 | End: 2020-10-27 | Stop reason: SDUPTHER

## 2020-05-25 RX ORDER — DIPHENOXYLATE HYDROCHLORIDE AND ATROPINE SULFATE 2.5; .025 MG/1; MG/1
1 TABLET ORAL 4 TIMES DAILY PRN
Qty: 100 TABLET | Refills: 1 | Status: SHIPPED | OUTPATIENT
Start: 2020-05-25 | End: 2020-09-25

## 2020-05-25 RX ORDER — DRONABINOL 5 MG/1
5 CAPSULE ORAL
Qty: 60 CAPSULE | Refills: 0 | Status: SHIPPED | OUTPATIENT
Start: 2020-05-25 | End: 2020-05-25 | Stop reason: SDUPTHER

## 2020-05-25 RX ORDER — DRONABINOL 5 MG/1
5 CAPSULE ORAL
Qty: 60 CAPSULE | Refills: 0 | Status: SHIPPED | OUTPATIENT
Start: 2020-05-25 | End: 2020-08-24 | Stop reason: SDUPTHER

## 2020-05-27 ENCOUNTER — PATIENT MESSAGE (OUTPATIENT)
Dept: GASTROENTEROLOGY | Facility: CLINIC | Age: 40
End: 2020-05-27

## 2020-05-27 ENCOUNTER — TELEPHONE (OUTPATIENT)
Dept: GASTROENTEROLOGY | Facility: CLINIC | Age: 40
End: 2020-05-27

## 2020-05-27 ENCOUNTER — INFUSION (OUTPATIENT)
Dept: INFECTIOUS DISEASES | Facility: HOSPITAL | Age: 40
End: 2020-05-27
Attending: INTERNAL MEDICINE
Payer: MEDICARE

## 2020-05-27 ENCOUNTER — HOSPITAL ENCOUNTER (OUTPATIENT)
Dept: RADIOLOGY | Facility: HOSPITAL | Age: 40
Discharge: HOME | End: 2020-05-27
Attending: INTERNAL MEDICINE
Payer: MEDICARE

## 2020-05-27 DIAGNOSIS — K50.019 CROHN'S DISEASE OF SMALL INTESTINE WITH COMPLICATION: ICD-10-CM

## 2020-05-27 PROCEDURE — 74183 MRI ABD W/O CNTR FLWD CNTR: CPT | Mod: 26,,, | Performed by: RADIOLOGY

## 2020-05-27 PROCEDURE — 72197 MRI PELVIS W/O & W/DYE: CPT | Mod: 26,,, | Performed by: RADIOLOGY

## 2020-05-27 PROCEDURE — 72197 MRI PELVIS W/O & W/DYE: CPT | Mod: TC

## 2020-05-27 PROCEDURE — A9585 GADOBUTROL INJECTION: HCPCS | Performed by: INTERNAL MEDICINE

## 2020-05-27 PROCEDURE — 25500020 PHARM REV CODE 255: Performed by: INTERNAL MEDICINE

## 2020-05-27 PROCEDURE — 72197 MRI ENTEROGRAPHY: ICD-10-PCS | Mod: 26,,, | Performed by: RADIOLOGY

## 2020-05-27 PROCEDURE — 74183 MRI ENTEROGRAPHY: ICD-10-PCS | Mod: 26,,, | Performed by: RADIOLOGY

## 2020-05-27 RX ORDER — GADOBUTROL 604.72 MG/ML
10 INJECTION INTRAVENOUS
Status: COMPLETED | OUTPATIENT
Start: 2020-05-27 | End: 2020-05-27

## 2020-05-27 RX ADMIN — GADOBUTROL 10 ML: 604.72 INJECTION INTRAVENOUS at 05:05

## 2020-05-27 NOTE — TELEPHONE ENCOUNTER
----- Message from Taylor Tubbs sent at 5/27/2020  3:16 PM CDT -----  Contact: pt at [phone]   call [phone] in image center  Please call pt to reschedule her MRI since no one can put an order in for pt today as everyone is out at this time 0320 May 27, 20. Pt at [phone]

## 2020-05-27 NOTE — PROGRESS NOTES
Pt arrived to infusion suite for IV placement for MRI. 20 G IV placed to Right wrist on 3rd attempt. +flashback, flushed without complications. Patient ambulated to MRI in NAD.

## 2020-05-28 ENCOUNTER — PATIENT MESSAGE (OUTPATIENT)
Dept: OBSTETRICS AND GYNECOLOGY | Facility: CLINIC | Age: 40
End: 2020-05-28

## 2020-05-28 ENCOUNTER — PATIENT MESSAGE (OUTPATIENT)
Dept: GASTROENTEROLOGY | Facility: CLINIC | Age: 40
End: 2020-05-28

## 2020-05-29 DIAGNOSIS — F41.1 GENERALIZED ANXIETY DISORDER: ICD-10-CM

## 2020-05-29 RX ORDER — CLONAZEPAM 1 MG/1
TABLET ORAL
Qty: 90 TABLET | Refills: 0 | Status: SHIPPED | OUTPATIENT
Start: 2020-05-29 | End: 2020-06-29 | Stop reason: SDUPTHER

## 2020-06-03 ENCOUNTER — TELEPHONE (OUTPATIENT)
Dept: ORTHOPEDICS | Facility: CLINIC | Age: 40
End: 2020-06-03

## 2020-06-03 DIAGNOSIS — R52 PAIN: Primary | ICD-10-CM

## 2020-06-03 NOTE — TELEPHONE ENCOUNTER
----- Message from Nani Oliveros sent at 6/3/2020  2:59 PM CDT -----  Contact: [phone]  Pt is scheduled for 6/11 but may xray. Please call patient and advise thanks

## 2020-06-03 NOTE — TELEPHONE ENCOUNTER
----- Message from Katharine Tyler sent at 6/3/2020  4:14 PM CDT -----  Contact: Self  Type:  Patient Returning Call    Who Called: PHANI RODRIGUEZ [[id number]]    Who Left Message for Patient: Debbie    Does the patient know what this is regarding?:Y    Would the patient rather a call back or a response via [facility]? Call    Best Call Back Number: [phone]    Additional Information:

## 2020-06-10 DIAGNOSIS — N83.201 CYST OF RIGHT OVARY: ICD-10-CM

## 2020-06-10 DIAGNOSIS — Z01.812 PRE-PROCEDURE LAB EXAM: ICD-10-CM

## 2020-06-10 DIAGNOSIS — R10.2 PELVIC PAIN IN FEMALE: Primary | ICD-10-CM

## 2020-06-11 ENCOUNTER — HOSPITAL ENCOUNTER (OUTPATIENT)
Dept: RADIOLOGY | Facility: OTHER | Age: 40
Discharge: HOME | End: 2020-06-11
Attending: PHYSICIAN ASSISTANT
Payer: MEDICARE

## 2020-06-11 ENCOUNTER — OFFICE VISIT (OUTPATIENT)
Dept: ORTHOPEDICS | Facility: CLINIC | Age: 40
End: 2020-06-11
Payer: MEDICARE

## 2020-06-11 VITALS
HEART RATE: 84 BPM | SYSTOLIC BLOOD PRESSURE: 116 MMHG | BODY MASS INDEX: 23.6 KG/M2 | HEIGHT: 61 IN | DIASTOLIC BLOOD PRESSURE: 85 MMHG | WEIGHT: 125 LBS

## 2020-06-11 DIAGNOSIS — R52 PAIN: ICD-10-CM

## 2020-06-11 DIAGNOSIS — G89.29 CHRONIC LEFT SHOULDER PAIN: ICD-10-CM

## 2020-06-11 DIAGNOSIS — M25.512 CHRONIC LEFT SHOULDER PAIN: ICD-10-CM

## 2020-06-11 DIAGNOSIS — M75.42 IMPINGEMENT SYNDROME OF LEFT SHOULDER REGION: Primary | ICD-10-CM

## 2020-06-11 DIAGNOSIS — M25.612 DECREASED RANGE OF MOTION OF LEFT SHOULDER: ICD-10-CM

## 2020-06-11 PROCEDURE — 99203 OFFICE O/P NEW LOW 30 MIN: CPT | Mod: 25,S$PBB,, | Performed by: PHYSICIAN ASSISTANT

## 2020-06-11 PROCEDURE — 99999 PR PBB SHADOW E&M-EST. PATIENT-LVL V: CPT | Mod: PBBFAC,,, | Performed by: PHYSICIAN ASSISTANT

## 2020-06-11 PROCEDURE — 20610 DRAIN/INJ JOINT/BURSA W/O US: CPT | Mod: PBBFAC | Performed by: PHYSICIAN ASSISTANT

## 2020-06-11 PROCEDURE — 20610 PR DRAIN/INJECT LARGE JOINT/BURSA: ICD-10-PCS | Mod: S$PBB,LT,, | Performed by: PHYSICIAN ASSISTANT

## 2020-06-11 PROCEDURE — 73030 XR SHOULDER TRAUMA 3 VIEW LEFT: ICD-10-PCS | Mod: 26,LT,, | Performed by: RADIOLOGY

## 2020-06-11 PROCEDURE — 99215 OFFICE O/P EST HI 40 MIN: CPT | Mod: PBBFAC,25 | Performed by: PHYSICIAN ASSISTANT

## 2020-06-11 PROCEDURE — 99999 PR PBB SHADOW E&M-EST. PATIENT-LVL V: ICD-10-PCS | Mod: PBBFAC,,, | Performed by: PHYSICIAN ASSISTANT

## 2020-06-11 PROCEDURE — 73030 X-RAY EXAM OF SHOULDER: CPT | Mod: 26,LT,, | Performed by: RADIOLOGY

## 2020-06-11 PROCEDURE — 20610 DRAIN/INJ JOINT/BURSA W/O US: CPT | Mod: S$PBB,LT,, | Performed by: PHYSICIAN ASSISTANT

## 2020-06-11 PROCEDURE — 99203 PR OFFICE/OUTPT VISIT, NEW, LEVL III, 30-44 MIN: ICD-10-PCS | Mod: 25,S$PBB,, | Performed by: PHYSICIAN ASSISTANT

## 2020-06-11 PROCEDURE — 73030 X-RAY EXAM OF SHOULDER: CPT | Mod: TC,FY,LT

## 2020-06-11 RX ORDER — BUPIVACAINE HYDROCHLORIDE 2.5 MG/ML
4 INJECTION, SOLUTION INFILTRATION; PERINEURAL
Status: COMPLETED | OUTPATIENT
Start: 2020-06-11 | End: 2020-06-11

## 2020-06-11 RX ORDER — PROMETHAZINE HYDROCHLORIDE 25 MG/1
TABLET ORAL
Qty: 30 TABLET | Refills: 0 | Status: SHIPPED | OUTPATIENT
Start: 2020-06-11 | End: 2020-07-13 | Stop reason: SDUPTHER

## 2020-06-11 RX ORDER — TRIAMCINOLONE ACETONIDE 40 MG/ML
80 INJECTION, SUSPENSION INTRA-ARTICULAR; INTRAMUSCULAR
Status: COMPLETED | OUTPATIENT
Start: 2020-06-11 | End: 2020-06-11

## 2020-06-11 RX ADMIN — BUPIVACAINE HYDROCHLORIDE 10 MG: 2.5 INJECTION, SOLUTION INFILTRATION; PERINEURAL at 04:06

## 2020-06-11 RX ADMIN — TRIAMCINOLONE ACETONIDE 80 MG: 40 INJECTION, SUSPENSION INTRA-ARTICULAR; INTRAMUSCULAR at 04:06

## 2020-06-11 NOTE — LETTER
June 11, 2020      Kalia Astorga MD  [address]  [facility] [address]           [facility] [address]  [address], [address]  [facility] [address]  Phone: [phone]          Patient: Ivy Salgado   MR Number: [id number]   YOB: 1980   Date of Visit: 6/11/2020       Dear Dr. Kalia Astorga:    Thank you for referring Ivy Salgado to me for evaluation. Attached you will find relevant portions of my assessment and plan of care.    If you have questions, please do not hesitate to call me. I look forward to following Ivy Salgado along with you.    Sincerely,    NOVA Garrido    Enclosure  CC:  No Recipients    If you would like to receive this communication electronically, please contact [email or web address] or [phone] to request more information on [employer] access.    For providers and/or their staff who would like to refer a patient to [facility], please contact us through our one-stop-shop provider referral line, [facility], at [phone].    If you feel you have received this communication in error or would no longer like to receive these types of communications, please e-mail [email or web address]
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
## 2020-06-15 ENCOUNTER — PATIENT MESSAGE (OUTPATIENT)
Dept: GASTROENTEROLOGY | Facility: CLINIC | Age: 40
End: 2020-06-15

## 2020-06-16 ENCOUNTER — INFUSION (OUTPATIENT)
Dept: INFECTIOUS DISEASES | Facility: HOSPITAL | Age: 40
End: 2020-06-16
Attending: INTERNAL MEDICINE
Payer: MEDICARE

## 2020-06-16 VITALS
HEART RATE: 93 BPM | OXYGEN SATURATION: 98 % | TEMPERATURE: 99 F | BODY MASS INDEX: 23.73 KG/M2 | SYSTOLIC BLOOD PRESSURE: 157 MMHG | RESPIRATION RATE: 17 BRPM | WEIGHT: 125.69 LBS | DIASTOLIC BLOOD PRESSURE: 97 MMHG | HEIGHT: 61 IN

## 2020-06-16 DIAGNOSIS — K50.00 CROHN'S DISEASE OF SMALL INTESTINE WITHOUT COMPLICATION: Chronic | ICD-10-CM

## 2020-06-16 DIAGNOSIS — K50.00 CROHN'S DISEASE OF SMALL INTESTINE: Primary | ICD-10-CM

## 2020-06-16 DIAGNOSIS — K50.019 CROHN'S DISEASE OF SMALL INTESTINE WITH COMPLICATION: ICD-10-CM

## 2020-06-16 PROCEDURE — 25000003 PHARM REV CODE 250: Performed by: INTERNAL MEDICINE

## 2020-06-16 PROCEDURE — 96360 HYDRATION IV INFUSION INIT: CPT

## 2020-06-16 RX ORDER — SODIUM CHLORIDE 0.9 % (FLUSH) 0.9 %
10 SYRINGE (ML) INJECTION
Status: CANCELLED | OUTPATIENT
Start: 2020-06-16

## 2020-06-16 RX ORDER — HEPARIN 100 UNIT/ML
500 SYRINGE INTRAVENOUS
Status: CANCELLED | OUTPATIENT
Start: 2020-06-16

## 2020-06-16 RX ORDER — SODIUM CHLORIDE AND POTASSIUM CHLORIDE 150; 900 MG/100ML; MG/100ML
INJECTION, SOLUTION INTRAVENOUS CONTINUOUS
Status: CANCELLED
Start: 2020-06-16

## 2020-06-16 RX ORDER — HEPARIN 100 UNIT/ML
500 SYRINGE INTRAVENOUS
Status: DISCONTINUED | OUTPATIENT
Start: 2020-06-16 | End: 2020-06-16 | Stop reason: HOSPADM

## 2020-06-16 RX ORDER — SODIUM CHLORIDE 0.9 % (FLUSH) 0.9 %
10 SYRINGE (ML) INJECTION
Status: DISCONTINUED | OUTPATIENT
Start: 2020-06-16 | End: 2020-06-16 | Stop reason: HOSPADM

## 2020-06-16 RX ADMIN — SODIUM CHLORIDE 1000 ML: 0.9 INJECTION, SOLUTION INTRAVENOUS at 01:06

## 2020-06-16 NOTE — PROGRESS NOTES
Patient arrived for Normal Saline over one hour.  Patient tolerated infusion well and left in NAD.

## 2020-06-23 ENCOUNTER — PATIENT MESSAGE (OUTPATIENT)
Dept: INTERNAL MEDICINE | Facility: CLINIC | Age: 40
End: 2020-06-23

## 2020-06-23 RX ORDER — SUMATRIPTAN 50 MG/1
TABLET, FILM COATED ORAL
Qty: 12 TABLET | Refills: 0 | Status: SHIPPED | OUTPATIENT
Start: 2020-06-23 | End: 2021-04-12

## 2020-06-24 ENCOUNTER — TELEPHONE (OUTPATIENT)
Dept: OBSTETRICS AND GYNECOLOGY | Facility: CLINIC | Age: 40
End: 2020-06-24

## 2020-06-24 RX ORDER — TRAMADOL HYDROCHLORIDE 50 MG/1
50 TABLET ORAL EVERY 6 HOURS PRN
Qty: 10 TABLET | Refills: 0 | Status: ON HOLD | OUTPATIENT
Start: 2020-06-24 | End: 2020-09-23 | Stop reason: CLARIF

## 2020-06-26 ENCOUNTER — TELEPHONE (OUTPATIENT)
Dept: INFECTIOUS DISEASES | Facility: CLINIC | Age: 40
End: 2020-06-26

## 2020-06-26 NOTE — TELEPHONE ENCOUNTER
Tried calling patient = no answer and unable to leave voicemail (mailbox full)  (need to confirm appt and ask covid screening questions)

## 2020-06-29 ENCOUNTER — INFUSION (OUTPATIENT)
Dept: INFECTIOUS DISEASES | Facility: HOSPITAL | Age: 40
End: 2020-06-29
Attending: INTERNAL MEDICINE
Payer: MEDICARE

## 2020-06-29 VITALS
BODY MASS INDEX: 23.77 KG/M2 | DIASTOLIC BLOOD PRESSURE: 88 MMHG | HEART RATE: 92 BPM | TEMPERATURE: 99 F | HEIGHT: 61 IN | OXYGEN SATURATION: 97 % | SYSTOLIC BLOOD PRESSURE: 138 MMHG | WEIGHT: 125.88 LBS | RESPIRATION RATE: 16 BRPM

## 2020-06-29 DIAGNOSIS — K50.00 CROHN'S DISEASE OF SMALL INTESTINE WITHOUT COMPLICATION: Primary | ICD-10-CM

## 2020-06-29 PROCEDURE — 25000003 PHARM REV CODE 250: Performed by: INTERNAL MEDICINE

## 2020-06-29 PROCEDURE — 96360 HYDRATION IV INFUSION INIT: CPT

## 2020-06-29 RX ORDER — HEPARIN 100 UNIT/ML
500 SYRINGE INTRAVENOUS
Status: CANCELLED | OUTPATIENT
Start: 2020-06-29

## 2020-06-29 RX ORDER — SODIUM CHLORIDE 0.9 % (FLUSH) 0.9 %
10 SYRINGE (ML) INJECTION
Status: DISCONTINUED | OUTPATIENT
Start: 2020-06-29 | End: 2020-06-29 | Stop reason: HOSPADM

## 2020-06-29 RX ORDER — SODIUM CHLORIDE 0.9 % (FLUSH) 0.9 %
10 SYRINGE (ML) INJECTION
Status: CANCELLED | OUTPATIENT
Start: 2020-06-29

## 2020-06-29 RX ORDER — HEPARIN 100 UNIT/ML
500 SYRINGE INTRAVENOUS
Status: DISCONTINUED | OUTPATIENT
Start: 2020-06-29 | End: 2020-06-29 | Stop reason: HOSPADM

## 2020-06-29 RX ADMIN — SODIUM CHLORIDE 1000 ML: 0.9 INJECTION, SOLUTION INTRAVENOUS at 12:06

## 2020-06-29 NOTE — PROGRESS NOTES
Patient arrived for Normal Saline over one hour.  Patient tolerated infusion well and left in NAD.

## 2020-07-06 ENCOUNTER — NURSE TRIAGE (OUTPATIENT)
Dept: ADMINISTRATIVE | Facility: CLINIC | Age: 40
End: 2020-07-06

## 2020-07-06 ENCOUNTER — LAB VISIT (OUTPATIENT)
Dept: INTERNAL MEDICINE | Facility: CLINIC | Age: 40
End: 2020-07-06
Attending: SPECIALIST
Payer: MEDICARE

## 2020-07-06 ENCOUNTER — PATIENT MESSAGE (OUTPATIENT)
Dept: OBSTETRICS AND GYNECOLOGY | Facility: CLINIC | Age: 40
End: 2020-07-06

## 2020-07-06 ENCOUNTER — TELEPHONE (OUTPATIENT)
Dept: OBSTETRICS AND GYNECOLOGY | Facility: CLINIC | Age: 40
End: 2020-07-06

## 2020-07-06 DIAGNOSIS — Z01.812 PRE-PROCEDURE LAB EXAM: ICD-10-CM

## 2020-07-06 PROCEDURE — U0003 INFECTIOUS AGENT DETECTION BY NUCLEIC ACID (DNA OR RNA); SEVERE ACUTE RESPIRATORY SYNDROME CORONAVIRUS 2 (SARS-COV-2) (CORONAVIRUS DISEASE [COVID-19]), AMPLIFIED PROBE TECHNIQUE, MAKING USE OF HIGH THROUGHPUT TECHNOLOGIES AS DESCRIBED BY CMS-2020-01-R: HCPCS

## 2020-07-06 NOTE — TELEPHONE ENCOUNTER
Pt needing covid testing prior to surgery.  Provided pt with web site and link for pre procedure testing.  She stated understanding.

## 2020-07-07 ENCOUNTER — OFFICE VISIT (OUTPATIENT)
Dept: OBSTETRICS AND GYNECOLOGY | Facility: CLINIC | Age: 40
End: 2020-07-07
Payer: MEDICARE

## 2020-07-07 VITALS
RESPIRATION RATE: 16 BRPM | BODY MASS INDEX: 24.24 KG/M2 | DIASTOLIC BLOOD PRESSURE: 79 MMHG | WEIGHT: 128.31 LBS | SYSTOLIC BLOOD PRESSURE: 123 MMHG

## 2020-07-07 DIAGNOSIS — R10.2 PELVIC PAIN IN FEMALE: Primary | ICD-10-CM

## 2020-07-07 DIAGNOSIS — Z01.812 PRE-PROCEDURE LAB EXAM: ICD-10-CM

## 2020-07-07 DIAGNOSIS — N83.201 CYST OF RIGHT OVARY: ICD-10-CM

## 2020-07-07 PROCEDURE — 99024 PR POST-OP FOLLOW-UP VISIT: ICD-10-PCS | Mod: S$PBB,,, | Performed by: SPECIALIST

## 2020-07-07 PROCEDURE — 99999 PR PBB SHADOW E&M-EST. PATIENT-LVL III: CPT | Mod: PBBFAC,,, | Performed by: SPECIALIST

## 2020-07-07 PROCEDURE — 99213 OFFICE O/P EST LOW 20 MIN: CPT | Mod: PBBFAC,PN | Performed by: SPECIALIST

## 2020-07-07 PROCEDURE — 99999 PR PBB SHADOW E&M-EST. PATIENT-LVL III: ICD-10-PCS | Mod: PBBFAC,,, | Performed by: SPECIALIST

## 2020-07-07 PROCEDURE — 99024 POSTOP FOLLOW-UP VISIT: CPT | Mod: S$PBB,,, | Performed by: SPECIALIST

## 2020-07-07 RX ORDER — MUPIROCIN 20 MG/G
OINTMENT TOPICAL
Status: CANCELLED | OUTPATIENT
Start: 2020-07-07

## 2020-07-07 RX ORDER — SODIUM CHLORIDE 9 MG/ML
INJECTION, SOLUTION INTRAVENOUS CONTINUOUS
Status: CANCELLED | OUTPATIENT
Start: 2020-07-07

## 2020-07-07 NOTE — H&P (VIEW-ONLY)
41 yo WF followed for CPP and noted possible adnexal remnant vs unrelated structure by recent u/s. Pt has history of Esperanza as well as BSo but c/o PP and pelvic u/s reveals the following...      Narrative & Impression     EXAMINATION:  US PELVIS COMP WITH TRANSVAG NON-OB (XPD)     CLINICAL HISTORY:  Pelvic and perineal pain     TECHNIQUE:  Transabdominal sonography of the pelvis was performed, followed by transvaginal sonography to better evaluate the uterus and ovaries.     COMPARISON:  02/14/2020     FINDINGS:  The uterus is not visualized consistent with hysterectomy     The right ovary is thought to be visualized at 2.5 x 2.4 x 2.1 cm and the left ovary is not visualized consistent with oophorectomy.     A cystic region noted in the region of the right ovary of 2.1 x 2.2 x 1.7 cm.  This structure is slightly larger than that seen on the prior 02/14/2020.  This demonstrates some mural echogenicity that could relate to debris without obvious color flow.  A mural nodule may be present but this more likely relates to debris.  This could relate to a hemorrhagic cyst, endometrioma, or ovarian neoplastic process.  Continued close follow-up, MRI female pelvis evaluation, and or surgical removal may be necessary.     A 2nd rounded cystic region is noted within the cul-de-sac that appears similar measuring 2.7 x 2.1 x 1.6 cm with some mural echogenicity without obvious blood flow.  This could also relate to hematoma formation or endometrioma formation.  Other ovarian neoplastic processes are not entirely excluded but this is not definitively directly related to an ovary     This report was flagged in Epic as abnormal.     Impression:     1. Right ovarian cystic lesion larger than that noted on the prior with internal echogenicity most likely a hemorrhagic cyst or endometrioma.  Ovarian neoplastic processes are not excluded.  If this is the same structure seen on the prior it is likely slightly larger.  Close follow-up in 3  months and or MRI female pelvis evaluation may be of use.  Please clinically correlate.  2. Interval development or discovery of a rounded fluid echogenicity within the cul-de-sac with some mural nodularity or calcification without obvious blood flow.  This could relate to a hemorrhagic cyst or endometrioma as well, ovarian neoplastic processes are not excluded.  Follow-up in 3 months or MRI female pelvis evaluation may be of use.        Electronically signed by: Doug Menendez MD  Date:                                            05/12/2020     Past Medical History:   Diagnosis Date    Abnormal Pap smear 2007    Abnormal Pap smear 5/26/2011    Anemia     Anxiety     Arthritis     C. difficile diarrhea     Crohn's disease     Depression 8/5/2017    Encounter for blood transfusion     Genital HSV     History of colposcopy with cervical biopsy 2007 and 7/2011 2007-LYLA I  and 7/2011- LYLA I    Hypertension     Kidney stone     Kidney stone     Melanoma     Recurrent UTI 4/3/2013    S/P ileostomy 7/9/2012    Sterilization 6/23/2012       Past Surgical History:   Procedure Laterality Date    ABDOMINAL SURGERY      APPENDECTOMY      BILATERAL SALPINGO-OOPHORECTOMY (BSO) Bilateral 5/30/2019    Procedure: SALPINGO-OOPHORECTOMY, BILATERAL;  Surgeon: Rupa German MD;  Location: [facility] OR [address] [place] [address];  Service: OB/GYN;  Laterality: Bilateral;    BLADDER SURGERY      partial cystectomy due to fistula    [facility]      COLON SURGERY      COLONOSCOPY      EXCISION OF MELANOMA  07/17/2019    ILEOSTOMY      LYSIS OF ADHESIONS N/A 5/30/2019    Procedure: LYSIS, ADHESIONS;  Surgeon: Rpua German MD;  Location: [facility] OR [address] [place] [address];  Service: OB/GYN;  Laterality: N/A;    OOPHORECTOMY Right 04/16/2015    PORTACATH PLACEMENT  02/21/2017    SKIN BIOPSY      SMALL INTESTINE SURGERY      age 18 Y    TOTAL ABDOMINAL HYSTERECTOMY  04/16/2015    TOTAL COLECTOMY      TUBAL LIGATION  06/06/2012    UPPER  GASTROINTESTINAL ENDOSCOPY         Family History   Problem Relation Age of Onset    Colon cancer Father     Cancer Father         colon cancer    Liver cancer Father     Hypertension Mother     Cancer Maternal Grandfather         esophageal      Skin cancer Maternal Grandfather     Endometrial cancer Maternal Aunt     Crohn's disease Brother     Breast cancer Neg Hx     Ovarian cancer Neg Hx     Melanoma Neg Hx        Social History     Socioeconomic History    Marital status: Single     Spouse name: Not on file    Number of children: Not on file    Years of education: Not on file    Highest education level: Not on file   Occupational History     Employer: [employer]   Social Needs    Financial resource strain: Not on file    Food insecurity     Worry: Not on file     Inability: Not on file    Transportation needs     Medical: Not on file     Non-medical: Not on file   Tobacco Use    Smoking status: Never Smoker    Smokeless tobacco: Never Used   Substance and Sexual Activity    Alcohol use: Not Currently     Alcohol/week: 0.0 standard drinks    Drug use: No    Sexual activity: Yes     Partners: Male     Birth control/protection: Surgical     Comment: HYST   Lifestyle    Physical activity     Days per week: Not on file     Minutes per session: Not on file    Stress: Not on file   Relationships    Social connections     Talks on phone: Not on file     Gets together: Not on file     Attends Jehovah's witness service: Not on file     Active member of club or organization: Not on file     Attends meetings of clubs or organizations: Not on file     Relationship status: Not on file   Other Topics Concern    Are you pregnant or think you may be? No    Breast-feeding No   Social History Narrative    Not on file       Current Outpatient Medications   Medication Sig Dispense Refill    cephALEXin (KEFLEX) 250 MG capsule TAKE 1 CAPSULE(250 MG) BY MOUTH EVERY DAY 30 capsule 11    clonazePAM  (KLONOPIN) 1 MG tablet Take 1 and 1/2 tablets po bid prn anxiety 90 tablet 0    diphenoxylate-atropine 2.5-0.025 mg (LOMOTIL) 2.5-0.025 mg per tablet Take 1 tablet by mouth 4 (four) times daily as needed. for diarrhea 100 tablet 0    diphenoxylate-atropine 2.5-0.025 mg (LOMOTIL) 2.5-0.025 mg per tablet Take 1 tablet by mouth 4 (four) times daily as needed. for diarrhea 100 tablet 1    dronabinoL (MARINOL) 5 MG capsule Take 1 capsule (5 mg total) by mouth 2 (two) times daily before meals. 60 capsule 0    famotidine (PEPCID) 20 MG tablet Take 20 mg by mouth 2 (two) times daily.      fluconazole (DIFLUCAN) 100 MG tablet TAKE 1 TABLET BY MOUTH ONCE DAILY 7 tablet 0    food supplemt, lactose-reduced (BOOST BREEZE NUTRITIONAL) 0.04-1.05 gram-kcal/mL Liqd Use 3 times per day 6399 mL 5    gabapentin (NEURONTIN) 300 MG capsule Take 1 capsule (300 mg total) by mouth once daily. After two weeks, take one twice daily 60 capsule 11    loperamide (IMODIUM) 2 mg capsule Take 2 mg by mouth daily as needed for Diarrhea.      mirtazapine (REMERON SOL-TAB) 30 MG disintegrating tablet Take 1 tablet (30 mg total) by mouth nightly. 90 tablet 3    multivitamin (ONE DAILY MULTIVITAMIN) per tablet Take 1 tablet by mouth once daily.      promethazine (PHENERGAN) 25 MG tablet TAKE 1 TABLET(25 MG) BY MOUTH EVERY 6 HOURS AS NEEDED 30 tablet 0    sumatriptan (IMITREX) 50 MG tablet Take 1 tab po prn migraine; may repeat once in 2 hours prn; do not exceed 2 tabs in 24 hours 12 tablet 0    traMADoL (ULTRAM) 50 mg tablet Take 1 tablet (50 mg total) by mouth every 6 (six) hours as needed for Pain. 10 tablet 0    valACYclovir (VALTREX) 500 MG tablet TAKE 1 TABLET BY MOUTH EVERY DAY 90 tablet 2    vedolizumab (ENTYVIO) 300 mg SolR injection Inject 300 mg into the vein.      zolpidem (AMBIEN) 10 mg Tab Take 1 tablet (10 mg total) by mouth nightly. 30 tablet 0    potassium citrate (UROCIT-K 15) 15 mEq TbSR Take 2 tablets by mouth 2 (two)  times daily. 360 tablet 3     No current facility-administered medications for this visit.        Review of patient's allergies indicates:   Allergen Reactions    Azathioprine sodium Other (See Comments)     Other reaction(s): pancreatitis  Other reaction(s): pancreatitis    Methotrexate analogues Other (See Comments)     leukopenia    Stelara [ustekinumab] Other (See Comments)     Multiple infections    Zofran [ondansetron hcl (pf)] Other (See Comments)     Per patient causes prolong QT    Vancomycin analogues Hives    Morphine Itching and Other (See Comments)     Other reaction(s): Itching    Bactrim [sulfamethoxazole-trimethoprim] Palpitations    Ciprofloxacin Palpitations       Review of System:   General: no chills, fever, night sweats, weight gain or weight loss  Psychological: no depression or suicidal ideation  Breasts: no new or changing breast lumps, nipple discharge or masses.  Respiratory: no cough, shortness of breath, or wheezing  Cardiovascular: no chest pain or dyspnea on exertion  Gastrointestinal: no abdominal pain, change in bowel habits, or black or bloody stools  Genito-PELVIC PAIN  Musculoskeletal: no gait disturbance or muscular weakness    See pe ON 5/20    I DISCUSSED DIAGNOSTIC LAPAROSCOPE AND POSSIBLE ADHESIOLYSIS, POSSIBLE OVARIAN REMNANT  DISCUSSED RISKS AND BENEFITS OF PROCEDURE AND PT IS AGREEABLE  CONSENTS AND ORDERS OBTAINED AND WILL PROCEED AS SCHEDULED ASU 7/9

## 2020-07-07 NOTE — PROGRESS NOTES
41 yo WF followed for CPP and noted possible adnexal remnant vs unrelated structure by recent u/s. Pt has history of Esperanza as well as BSo but c/o PP and pelvic u/s reveals the following...      Narrative & Impression     EXAMINATION:  US PELVIS COMP WITH TRANSVAG NON-OB (XPD)     CLINICAL HISTORY:  Pelvic and perineal pain     TECHNIQUE:  Transabdominal sonography of the pelvis was performed, followed by transvaginal sonography to better evaluate the uterus and ovaries.     COMPARISON:  02/14/2020     FINDINGS:  The uterus is not visualized consistent with hysterectomy     The right ovary is thought to be visualized at 2.5 x 2.4 x 2.1 cm and the left ovary is not visualized consistent with oophorectomy.     A cystic region noted in the region of the right ovary of 2.1 x 2.2 x 1.7 cm.  This structure is slightly larger than that seen on the prior 02/14/2020.  This demonstrates some mural echogenicity that could relate to debris without obvious color flow.  A mural nodule may be present but this more likely relates to debris.  This could relate to a hemorrhagic cyst, endometrioma, or ovarian neoplastic process.  Continued close follow-up, MRI female pelvis evaluation, and or surgical removal may be necessary.     A 2nd rounded cystic region is noted within the cul-de-sac that appears similar measuring 2.7 x 2.1 x 1.6 cm with some mural echogenicity without obvious blood flow.  This could also relate to hematoma formation or endometrioma formation.  Other ovarian neoplastic processes are not entirely excluded but this is not definitively directly related to an ovary     This report was flagged in Epic as abnormal.     Impression:     1. Right ovarian cystic lesion larger than that noted on the prior with internal echogenicity most likely a hemorrhagic cyst or endometrioma.  Ovarian neoplastic processes are not excluded.  If this is the same structure seen on the prior it is likely slightly larger.  Close follow-up in 3  months and or MRI female pelvis evaluation may be of use.  Please clinically correlate.  2. Interval development or discovery of a rounded fluid echogenicity within the cul-de-sac with some mural nodularity or calcification without obvious blood flow.  This could relate to a hemorrhagic cyst or endometrioma as well, ovarian neoplastic processes are not excluded.  Follow-up in 3 months or MRI female pelvis evaluation may be of use.        Electronically signed by: Doug Menendez MD  Date:                                            05/12/2020     Past Medical History:   Diagnosis Date    Abnormal Pap smear 2007    Abnormal Pap smear 5/26/2011    Anemia     Anxiety     Arthritis     C. difficile diarrhea     Crohn's disease     Depression 8/5/2017    Encounter for blood transfusion     Genital HSV     History of colposcopy with cervical biopsy 2007 and 7/2011 2007-LYLA I  and 7/2011- LYLA I    Hypertension     Kidney stone     Kidney stone     Melanoma     Recurrent UTI 4/3/2013    S/P ileostomy 7/9/2012    Sterilization 6/23/2012       Past Surgical History:   Procedure Laterality Date    ABDOMINAL SURGERY      APPENDECTOMY      BILATERAL SALPINGO-OOPHORECTOMY (BSO) Bilateral 5/30/2019    Procedure: SALPINGO-OOPHORECTOMY, BILATERAL;  Surgeon: Rupa German MD;  Location: [facility] OR [address] [place] [address];  Service: OB/GYN;  Laterality: Bilateral;    BLADDER SURGERY      partial cystectomy due to fistula    [facility]      COLON SURGERY      COLONOSCOPY      EXCISION OF MELANOMA  07/17/2019    ILEOSTOMY      LYSIS OF ADHESIONS N/A 5/30/2019    Procedure: LYSIS, ADHESIONS;  Surgeon: Rupa German MD;  Location: [facility] OR [address] [place] [address];  Service: OB/GYN;  Laterality: N/A;    OOPHORECTOMY Right 04/16/2015    PORTACATH PLACEMENT  02/21/2017    SKIN BIOPSY      SMALL INTESTINE SURGERY      age 18 Y    TOTAL ABDOMINAL HYSTERECTOMY  04/16/2015    TOTAL COLECTOMY      TUBAL LIGATION  06/06/2012    UPPER  GASTROINTESTINAL ENDOSCOPY         Family History   Problem Relation Age of Onset    Colon cancer Father     Cancer Father         colon cancer    Liver cancer Father     Hypertension Mother     Cancer Maternal Grandfather         esophageal      Skin cancer Maternal Grandfather     Endometrial cancer Maternal Aunt     Crohn's disease Brother     Breast cancer Neg Hx     Ovarian cancer Neg Hx     Melanoma Neg Hx        Social History     Socioeconomic History    Marital status: Single     Spouse name: Not on file    Number of children: Not on file    Years of education: Not on file    Highest education level: Not on file   Occupational History     Employer: [employer]   Social Needs    Financial resource strain: Not on file    Food insecurity     Worry: Not on file     Inability: Not on file    Transportation needs     Medical: Not on file     Non-medical: Not on file   Tobacco Use    Smoking status: Never Smoker    Smokeless tobacco: Never Used   Substance and Sexual Activity    Alcohol use: Not Currently     Alcohol/week: 0.0 standard drinks    Drug use: No    Sexual activity: Yes     Partners: Male     Birth control/protection: Surgical     Comment: HYST   Lifestyle    Physical activity     Days per week: Not on file     Minutes per session: Not on file    Stress: Not on file   Relationships    Social connections     Talks on phone: Not on file     Gets together: Not on file     Attends Zoroastrian service: Not on file     Active member of club or organization: Not on file     Attends meetings of clubs or organizations: Not on file     Relationship status: Not on file   Other Topics Concern    Are you pregnant or think you may be? No    Breast-feeding No   Social History Narrative    Not on file       Current Outpatient Medications   Medication Sig Dispense Refill    cephALEXin (KEFLEX) 250 MG capsule TAKE 1 CAPSULE(250 MG) BY MOUTH EVERY DAY 30 capsule 11    clonazePAM  (KLONOPIN) 1 MG tablet Take 1 and 1/2 tablets po bid prn anxiety 90 tablet 0    diphenoxylate-atropine 2.5-0.025 mg (LOMOTIL) 2.5-0.025 mg per tablet Take 1 tablet by mouth 4 (four) times daily as needed. for diarrhea 100 tablet 0    diphenoxylate-atropine 2.5-0.025 mg (LOMOTIL) 2.5-0.025 mg per tablet Take 1 tablet by mouth 4 (four) times daily as needed. for diarrhea 100 tablet 1    dronabinoL (MARINOL) 5 MG capsule Take 1 capsule (5 mg total) by mouth 2 (two) times daily before meals. 60 capsule 0    famotidine (PEPCID) 20 MG tablet Take 20 mg by mouth 2 (two) times daily.      fluconazole (DIFLUCAN) 100 MG tablet TAKE 1 TABLET BY MOUTH ONCE DAILY 7 tablet 0    food supplemt, lactose-reduced (BOOST BREEZE NUTRITIONAL) 0.04-1.05 gram-kcal/mL Liqd Use 3 times per day 6399 mL 5    gabapentin (NEURONTIN) 300 MG capsule Take 1 capsule (300 mg total) by mouth once daily. After two weeks, take one twice daily 60 capsule 11    loperamide (IMODIUM) 2 mg capsule Take 2 mg by mouth daily as needed for Diarrhea.      mirtazapine (REMERON SOL-TAB) 30 MG disintegrating tablet Take 1 tablet (30 mg total) by mouth nightly. 90 tablet 3    multivitamin (ONE DAILY MULTIVITAMIN) per tablet Take 1 tablet by mouth once daily.      promethazine (PHENERGAN) 25 MG tablet TAKE 1 TABLET(25 MG) BY MOUTH EVERY 6 HOURS AS NEEDED 30 tablet 0    sumatriptan (IMITREX) 50 MG tablet Take 1 tab po prn migraine; may repeat once in 2 hours prn; do not exceed 2 tabs in 24 hours 12 tablet 0    traMADoL (ULTRAM) 50 mg tablet Take 1 tablet (50 mg total) by mouth every 6 (six) hours as needed for Pain. 10 tablet 0    valACYclovir (VALTREX) 500 MG tablet TAKE 1 TABLET BY MOUTH EVERY DAY 90 tablet 2    vedolizumab (ENTYVIO) 300 mg SolR injection Inject 300 mg into the vein.      zolpidem (AMBIEN) 10 mg Tab Take 1 tablet (10 mg total) by mouth nightly. 30 tablet 0    potassium citrate (UROCIT-K 15) 15 mEq TbSR Take 2 tablets by mouth 2 (two)  times daily. 360 tablet 3     No current facility-administered medications for this visit.        Review of patient's allergies indicates:   Allergen Reactions    Azathioprine sodium Other (See Comments)     Other reaction(s): pancreatitis  Other reaction(s): pancreatitis    Methotrexate analogues Other (See Comments)     leukopenia    Stelara [ustekinumab] Other (See Comments)     Multiple infections    Zofran [ondansetron hcl (pf)] Other (See Comments)     Per patient causes prolong QT    Vancomycin analogues Hives    Morphine Itching and Other (See Comments)     Other reaction(s): Itching    Bactrim [sulfamethoxazole-trimethoprim] Palpitations    Ciprofloxacin Palpitations       Review of System:   General: no chills, fever, night sweats, weight gain or weight loss  Psychological: no depression or suicidal ideation  Breasts: no new or changing breast lumps, nipple discharge or masses.  Respiratory: no cough, shortness of breath, or wheezing  Cardiovascular: no chest pain or dyspnea on exertion  Gastrointestinal: no abdominal pain, change in bowel habits, or black or bloody stools  Genito-PELVIC PAIN  Musculoskeletal: no gait disturbance or muscular weakness    See pe ON 5/20    I DISCUSSED DIAGNOSTIC LAPAROSCOPE AND POSSIBLE ADHESIOLYSIS, POSSIBLE OVARIAN REMNANT  DISCUSSED RISKS AND BENEFITS OF PROCEDURE AND PT IS AGREEABLE  CONSENTS AND ORDERS OBTAINED AND WILL PROCEED AS SCHEDULED ASU 7/9

## 2020-07-08 ENCOUNTER — LAB VISIT (OUTPATIENT)
Dept: LAB | Facility: HOSPITAL | Age: 40
End: 2020-07-08
Attending: SPECIALIST
Payer: MEDICARE

## 2020-07-08 ENCOUNTER — ANESTHESIA EVENT (OUTPATIENT)
Dept: SURGERY | Facility: HOSPITAL | Age: 40
End: 2020-07-08
Payer: MEDICARE

## 2020-07-08 DIAGNOSIS — R10.2 PELVIC PAIN IN FEMALE: ICD-10-CM

## 2020-07-08 LAB
BASOPHILS # BLD AUTO: 0.05 K/UL (ref 0–0.2)
BASOPHILS NFR BLD: 0.7 % (ref 0–1.9)
DIFFERENTIAL METHOD: ABNORMAL
EOSINOPHIL # BLD AUTO: 0.1 K/UL (ref 0–0.5)
EOSINOPHIL NFR BLD: 1 % (ref 0–8)
ERYTHROCYTE [DISTWIDTH] IN BLOOD BY AUTOMATED COUNT: 13.7 % (ref 11.5–14.5)
HCT VFR BLD AUTO: 44.7 % (ref 37–48.5)
HGB BLD-MCNC: 14.1 G/DL (ref 12–16)
IMM GRANULOCYTES # BLD AUTO: 0.01 K/UL (ref 0–0.04)
IMM GRANULOCYTES NFR BLD AUTO: 0.1 % (ref 0–0.5)
LYMPHOCYTES # BLD AUTO: 2.4 K/UL (ref 1–4.8)
LYMPHOCYTES NFR BLD: 33.1 % (ref 18–48)
MCH RBC QN AUTO: 29.3 PG (ref 27–31)
MCHC RBC AUTO-ENTMCNC: 31.5 G/DL (ref 32–36)
MCV RBC AUTO: 93 FL (ref 82–98)
MONOCYTES # BLD AUTO: 0.6 K/UL (ref 0.3–1)
MONOCYTES NFR BLD: 7.9 % (ref 4–15)
NEUTROPHILS # BLD AUTO: 4.1 K/UL (ref 1.8–7.7)
NEUTROPHILS NFR BLD: 57.2 % (ref 38–73)
NRBC BLD-RTO: 0 /100 WBC
PLATELET # BLD AUTO: 278 K/UL (ref 150–350)
PMV BLD AUTO: 9.9 FL (ref 9.2–12.9)
RBC # BLD AUTO: 4.81 M/UL (ref 4–5.4)
SARS-COV-2 RNA RESP QL NAA+PROBE: NEGATIVE
WBC # BLD AUTO: 7.24 K/UL (ref 3.9–12.7)

## 2020-07-08 PROCEDURE — 36415 COLL VENOUS BLD VENIPUNCTURE: CPT

## 2020-07-08 PROCEDURE — 85025 COMPLETE CBC W/AUTO DIFF WBC: CPT

## 2020-07-09 ENCOUNTER — HOSPITAL ENCOUNTER (OUTPATIENT)
Facility: HOSPITAL | Age: 40
Discharge: HOME | End: 2020-07-09
Attending: SPECIALIST | Admitting: SPECIALIST
Payer: MEDICARE

## 2020-07-09 ENCOUNTER — ANESTHESIA (OUTPATIENT)
Dept: SURGERY | Facility: HOSPITAL | Age: 40
End: 2020-07-09
Payer: MEDICARE

## 2020-07-09 ENCOUNTER — TELEPHONE (OUTPATIENT)
Dept: OBSTETRICS AND GYNECOLOGY | Facility: CLINIC | Age: 40
End: 2020-07-09

## 2020-07-09 DIAGNOSIS — R10.2 PELVIC PAIN IN FEMALE: Primary | ICD-10-CM

## 2020-07-09 PROCEDURE — D9220A PRA ANESTHESIA: Mod: ANES,,, | Performed by: ANESTHESIOLOGY

## 2020-07-09 PROCEDURE — 25000003 PHARM REV CODE 250: Mod: PO | Performed by: NURSE ANESTHETIST, CERTIFIED REGISTERED

## 2020-07-09 PROCEDURE — 71000039 HC RECOVERY, EACH ADD'L HOUR: Mod: PO | Performed by: SPECIALIST

## 2020-07-09 PROCEDURE — 37000009 HC ANESTHESIA EA ADD 15 MINS: Mod: PO | Performed by: SPECIALIST

## 2020-07-09 PROCEDURE — D9220A PRA ANESTHESIA: ICD-10-PCS | Mod: ANES,,, | Performed by: ANESTHESIOLOGY

## 2020-07-09 PROCEDURE — 63600175 PHARM REV CODE 636 W HCPCS: Mod: PO | Performed by: ANESTHESIOLOGY

## 2020-07-09 PROCEDURE — 00840 ANES IPER PX LOWER ABD NOS: CPT | Mod: PO | Performed by: SPECIALIST

## 2020-07-09 PROCEDURE — 36000709 HC OR TIME LEV III EA ADD 15 MIN: Mod: PO | Performed by: SPECIALIST

## 2020-07-09 PROCEDURE — D9220A PRA ANESTHESIA: Mod: CRNA,,, | Performed by: NURSE ANESTHETIST, CERTIFIED REGISTERED

## 2020-07-09 PROCEDURE — 49329 PR LAP; LYSIS OF ABDOMINAL ADHESIONS: ICD-10-PCS | Mod: ,,, | Performed by: SPECIALIST

## 2020-07-09 PROCEDURE — S0028 INJECTION, FAMOTIDINE, 20 MG: HCPCS | Mod: PO | Performed by: ANESTHESIOLOGY

## 2020-07-09 PROCEDURE — 49329 PR LAP; LYSIS OF ABDOMINAL ADHESIONS: ICD-10-PCS | Mod: 80,,, | Performed by: OBSTETRICS & GYNECOLOGY

## 2020-07-09 PROCEDURE — 49329 UNLSTD LAPS PX ABD PERTM&OMN: CPT | Mod: ,,, | Performed by: SPECIALIST

## 2020-07-09 PROCEDURE — 49329 UNLSTD LAPS PX ABD PERTM&OMN: CPT | Mod: 80,,, | Performed by: OBSTETRICS & GYNECOLOGY

## 2020-07-09 PROCEDURE — 25000003 PHARM REV CODE 250: Mod: PO | Performed by: ANESTHESIOLOGY

## 2020-07-09 PROCEDURE — 63600175 PHARM REV CODE 636 W HCPCS: Mod: PO | Performed by: NURSE ANESTHETIST, CERTIFIED REGISTERED

## 2020-07-09 PROCEDURE — 37000008 HC ANESTHESIA 1ST 15 MINUTES: Mod: PO | Performed by: SPECIALIST

## 2020-07-09 PROCEDURE — D9220A PRA ANESTHESIA: ICD-10-PCS | Mod: CRNA,,, | Performed by: NURSE ANESTHETIST, CERTIFIED REGISTERED

## 2020-07-09 PROCEDURE — 71000016 HC POSTOP RECOV ADDL HR: Mod: PO | Performed by: SPECIALIST

## 2020-07-09 PROCEDURE — 27201423 OPTIME MED/SURG SUP & DEVICES STERILE SUPPLY: Mod: PO | Performed by: SPECIALIST

## 2020-07-09 PROCEDURE — 71000033 HC RECOVERY, INTIAL HOUR: Mod: PO | Performed by: SPECIALIST

## 2020-07-09 PROCEDURE — 25000003 PHARM REV CODE 250: Mod: PO | Performed by: SPECIALIST

## 2020-07-09 PROCEDURE — 71000015 HC POSTOP RECOV 1ST HR: Mod: PO | Performed by: SPECIALIST

## 2020-07-09 PROCEDURE — 36000708 HC OR TIME LEV III 1ST 15 MIN: Mod: PO | Performed by: SPECIALIST

## 2020-07-09 RX ORDER — SODIUM CHLORIDE 9 MG/ML
INJECTION, SOLUTION INTRAVENOUS CONTINUOUS
Status: DISCONTINUED | OUTPATIENT
Start: 2020-07-09 | End: 2020-07-09 | Stop reason: HOSPADM

## 2020-07-09 RX ORDER — SODIUM CHLORIDE, SODIUM LACTATE, POTASSIUM CHLORIDE, CALCIUM CHLORIDE 600; 310; 30; 20 MG/100ML; MG/100ML; MG/100ML; MG/100ML
INJECTION, SOLUTION INTRAVENOUS CONTINUOUS
Status: DISCONTINUED | OUTPATIENT
Start: 2020-07-09 | End: 2020-07-09 | Stop reason: HOSPADM

## 2020-07-09 RX ORDER — MIDAZOLAM HYDROCHLORIDE 1 MG/ML
INJECTION INTRAMUSCULAR; INTRAVENOUS
Status: DISCONTINUED | OUTPATIENT
Start: 2020-07-09 | End: 2020-07-09

## 2020-07-09 RX ORDER — DIPHENHYDRAMINE HYDROCHLORIDE 50 MG/ML
25 INJECTION INTRAMUSCULAR; INTRAVENOUS ONCE
Status: COMPLETED | OUTPATIENT
Start: 2020-07-09 | End: 2020-07-09

## 2020-07-09 RX ORDER — FENTANYL CITRATE 50 UG/ML
25 INJECTION, SOLUTION INTRAMUSCULAR; INTRAVENOUS EVERY 5 MIN PRN
Status: COMPLETED | OUTPATIENT
Start: 2020-07-09 | End: 2020-07-09

## 2020-07-09 RX ORDER — LIDOCAINE HYDROCHLORIDE 10 MG/ML
1 INJECTION, SOLUTION EPIDURAL; INFILTRATION; INTRACAUDAL; PERINEURAL ONCE
Status: DISCONTINUED | OUTPATIENT
Start: 2020-07-09 | End: 2020-07-09 | Stop reason: HOSPADM

## 2020-07-09 RX ORDER — MUPIROCIN 20 MG/G
OINTMENT TOPICAL
Status: DISCONTINUED | OUTPATIENT
Start: 2020-07-09 | End: 2020-07-09 | Stop reason: HOSPADM

## 2020-07-09 RX ORDER — GLYCOPYRROLATE 0.2 MG/ML
INJECTION INTRAMUSCULAR; INTRAVENOUS
Status: DISCONTINUED | OUTPATIENT
Start: 2020-07-09 | End: 2020-07-09

## 2020-07-09 RX ORDER — KETAMINE HYDROCHLORIDE 100 MG/ML
INJECTION, SOLUTION INTRAMUSCULAR; INTRAVENOUS
Status: DISCONTINUED | OUTPATIENT
Start: 2020-07-09 | End: 2020-07-09

## 2020-07-09 RX ORDER — KETOROLAC TROMETHAMINE 30 MG/ML
15 INJECTION, SOLUTION INTRAMUSCULAR; INTRAVENOUS ONCE
Status: COMPLETED | OUTPATIENT
Start: 2020-07-09 | End: 2020-07-09

## 2020-07-09 RX ORDER — LIDOCAINE HCL/PF 100 MG/5ML
SYRINGE (ML) INTRAVENOUS
Status: DISCONTINUED | OUTPATIENT
Start: 2020-07-09 | End: 2020-07-09

## 2020-07-09 RX ORDER — PROPOFOL 10 MG/ML
VIAL (ML) INTRAVENOUS
Status: DISCONTINUED | OUTPATIENT
Start: 2020-07-09 | End: 2020-07-09

## 2020-07-09 RX ORDER — HYDROMORPHONE HYDROCHLORIDE 2 MG/ML
0.2 INJECTION, SOLUTION INTRAMUSCULAR; INTRAVENOUS; SUBCUTANEOUS EVERY 5 MIN PRN
Status: COMPLETED | OUTPATIENT
Start: 2020-07-09 | End: 2020-07-09

## 2020-07-09 RX ORDER — OXYCODONE AND ACETAMINOPHEN 5; 325 MG/1; MG/1
1 TABLET ORAL EVERY 4 HOURS PRN
Qty: 20 TABLET | Refills: 0 | Status: SHIPPED | OUTPATIENT
Start: 2020-07-09 | End: 2020-09-25

## 2020-07-09 RX ORDER — CEFAZOLIN SODIUM 2 G/50ML
2 SOLUTION INTRAVENOUS
Status: DISCONTINUED | OUTPATIENT
Start: 2020-07-09 | End: 2020-07-09 | Stop reason: HOSPADM

## 2020-07-09 RX ORDER — ROCURONIUM BROMIDE 10 MG/ML
INJECTION, SOLUTION INTRAVENOUS
Status: DISCONTINUED | OUTPATIENT
Start: 2020-07-09 | End: 2020-07-09

## 2020-07-09 RX ORDER — BUPIVACAINE HYDROCHLORIDE AND EPINEPHRINE 2.5; 5 MG/ML; UG/ML
INJECTION, SOLUTION EPIDURAL; INFILTRATION; INTRACAUDAL; PERINEURAL
Status: DISCONTINUED | OUTPATIENT
Start: 2020-07-09 | End: 2020-07-09 | Stop reason: HOSPADM

## 2020-07-09 RX ORDER — FAMOTIDINE 10 MG/ML
20 INJECTION INTRAVENOUS ONCE
Status: COMPLETED | OUTPATIENT
Start: 2020-07-09 | End: 2020-07-09

## 2020-07-09 RX ORDER — OXYCODONE HYDROCHLORIDE 5 MG/1
5 TABLET ORAL
Status: DISCONTINUED | OUTPATIENT
Start: 2020-07-09 | End: 2020-07-09 | Stop reason: HOSPADM

## 2020-07-09 RX ORDER — NEOSTIGMINE METHYLSULFATE 1 MG/ML
INJECTION, SOLUTION INTRAVENOUS
Status: DISCONTINUED | OUTPATIENT
Start: 2020-07-09 | End: 2020-07-09

## 2020-07-09 RX ORDER — FENTANYL CITRATE 50 UG/ML
INJECTION, SOLUTION INTRAMUSCULAR; INTRAVENOUS
Status: DISCONTINUED | OUTPATIENT
Start: 2020-07-09 | End: 2020-07-09

## 2020-07-09 RX ORDER — ACETAMINOPHEN 10 MG/ML
INJECTION, SOLUTION INTRAVENOUS
Status: DISCONTINUED | OUTPATIENT
Start: 2020-07-09 | End: 2020-07-09

## 2020-07-09 RX ADMIN — FENTANYL CITRATE 25 MCG: 50 INJECTION INTRAMUSCULAR; INTRAVENOUS at 08:07

## 2020-07-09 RX ADMIN — LIDOCAINE HYDROCHLORIDE 75 MG: 20 INJECTION PARENTERAL at 07:07

## 2020-07-09 RX ADMIN — HYDROMORPHONE HYDROCHLORIDE 0.2 MG: 2 INJECTION INTRAMUSCULAR; INTRAVENOUS; SUBCUTANEOUS at 09:07

## 2020-07-09 RX ADMIN — KETOROLAC TROMETHAMINE 15 MG: 30 INJECTION, SOLUTION INTRAMUSCULAR at 10:07

## 2020-07-09 RX ADMIN — ROCURONIUM BROMIDE 30 MG: 10 INJECTION, SOLUTION INTRAVENOUS at 07:07

## 2020-07-09 RX ADMIN — MIDAZOLAM HYDROCHLORIDE 2 MG: 1 INJECTION, SOLUTION INTRAMUSCULAR; INTRAVENOUS at 07:07

## 2020-07-09 RX ADMIN — FENTANYL CITRATE 50 MCG: 50 INJECTION, SOLUTION INTRAMUSCULAR; INTRAVENOUS at 07:07

## 2020-07-09 RX ADMIN — SODIUM CHLORIDE, SODIUM LACTATE, POTASSIUM CHLORIDE, AND CALCIUM CHLORIDE: .6; .31; .03; .02 INJECTION, SOLUTION INTRAVENOUS at 06:07

## 2020-07-09 RX ADMIN — PROMETHAZINE HYDROCHLORIDE 6.25 MG: 25 INJECTION INTRAMUSCULAR; INTRAVENOUS at 11:07

## 2020-07-09 RX ADMIN — NEOSTIGMINE METHYLSULFATE 4 MG: 1 INJECTION INTRAVENOUS at 08:07

## 2020-07-09 RX ADMIN — HYDROMORPHONE HYDROCHLORIDE 0.2 MG: 2 INJECTION INTRAMUSCULAR; INTRAVENOUS; SUBCUTANEOUS at 08:07

## 2020-07-09 RX ADMIN — FAMOTIDINE 20 MG: 10 INJECTION INTRAVENOUS at 08:07

## 2020-07-09 RX ADMIN — KETAMINE HYDROCHLORIDE 20 MG: 100 INJECTION, SOLUTION, CONCENTRATE INTRAMUSCULAR; INTRAVENOUS at 07:07

## 2020-07-09 RX ADMIN — PROPOFOL 150 MG: 10 INJECTION, EMULSION INTRAVENOUS at 07:07

## 2020-07-09 RX ADMIN — ACETAMINOPHEN 1000 MG: 10 INJECTION, SOLUTION INTRAVENOUS at 07:07

## 2020-07-09 RX ADMIN — MUPIROCIN: 20 OINTMENT TOPICAL at 06:07

## 2020-07-09 RX ADMIN — GLYCOPYRROLATE 0.4 MG: 0.2 INJECTION, SOLUTION INTRAMUSCULAR; INTRAVENOUS at 08:07

## 2020-07-09 RX ADMIN — OXYCODONE HYDROCHLORIDE 5 MG: 5 TABLET ORAL at 08:07

## 2020-07-09 RX ADMIN — DIPHENHYDRAMINE HYDROCHLORIDE 25 MG: 50 INJECTION, SOLUTION INTRAMUSCULAR; INTRAVENOUS at 10:07

## 2020-07-09 RX ADMIN — OXYCODONE HYDROCHLORIDE 5 MG: 5 TABLET ORAL at 09:07

## 2020-07-09 NOTE — ANESTHESIA POSTPROCEDURE EVALUATION
Anesthesia Post Evaluation    Patient: Ivy Salgado    Procedure(s) Performed: Procedure(s) (LRB):  LAPAROSCOPY, DIAGNOSTIC (N/A)  LYSIS, ADHESIONS, LAPAROSCOPIC (N/A)    Final Anesthesia Type: general    Patient location during evaluation: PACU  Patient participation: Yes- Able to Participate  Level of consciousness: sedated and awake  Post-procedure vital signs: reviewed and stable  Pain management: adequate  Airway patency: patent    PONV status at discharge: No PONV  Anesthetic complications: no      Cardiovascular status: hypertensive and blood pressure returned to baseline  Respiratory status: spontaneous ventilation  Hydration status: euvolemic  Follow-up not needed.          Vitals Value Taken Time   /81 07/09/20 0830   Temp  07/09/20 0845   Pulse 91 07/09/20 0830   Resp 17 07/09/20 0844   SpO2 100 % 07/09/20 0830         No case tracking events are documented in the log.      Pain/Michael Score: Pain Rating Prior to Med Admin: 8 (7/9/2020  8:44 AM)  Michael Score: 6 (7/9/2020  8:20 AM)

## 2020-07-09 NOTE — PLAN OF CARE
Pt discharged to home.  Discharge instructions given, pt stated understanding.   IV removed.  Pt left via wheelchair with mom to home.

## 2020-07-09 NOTE — ANESTHESIA PREPROCEDURE EVALUATION
07/09/2020  Ivy Salgado is a 40 y.o., female.    Anesthesia Evaluation    I have reviewed the Patient Summary Reports.    I have reviewed the Nursing Notes. I have reviewed the NPO Status.   I have reviewed the Medications.     Review of Systems  Hematology/Oncology:         -- Cancer in past history: Other (see Oncology comments) Oncology Comments: melanoma     Cardiovascular:   Hypertension    Pulmonary:   Shortness of breath    Renal/:   Chronic Renal Disease renal calculi    Hepatic/GI:   GERD Crohn's - ileostomy   Neurological:   Seizures    Psych:   Psychiatric History anxiety depression          Physical Exam  General:  Well nourished    Airway/Jaw/Neck:  Airway Findings: Mouth Opening: Normal Tongue: Normal  General Airway Assessment: Adult  Mallampati: II  Improves to I with phonation.      Dental:  Dental Findings: In tact   Chest/Lungs:  Chest/Lungs Findings: Clear to auscultation, Normal Respiratory Rate     Heart/Vascular:  Heart Findings: Rate: Normal  Rhythm: Regular Rhythm  Sounds: Normal        Mental Status:  Mental Status Findings:  Cooperative, Alert and Oriented         Anesthesia Plan  Type of Anesthesia, risks & benefits discussed:  Anesthesia Type:  general  Patient's Preference:   Intra-op Monitoring Plan: standard ASA monitors  Intra-op Monitoring Plan Comments:   Post Op Pain Control Plan:   Post Op Pain Control Plan Comments:   Induction:   IV and Inhalation  Beta Blocker:  Patient is not currently on a Beta-Blocker (No further documentation required).       Informed Consent: Patient understands risks and agrees with Anesthesia plan.  Questions answered. Anesthesia consent signed with patient.  ASA Score: 2     Day of Surgery Review of History & Physical:            Ready For Surgery From Anesthesia Perspective.

## 2020-07-09 NOTE — TRANSFER OF CARE
"Anesthesia Transfer of Care Note    Patient: Ivy Salgado    Procedure(s) Performed: Procedure(s) (LRB):  LAPAROSCOPY, DIAGNOSTIC (N/A)  LYSIS, ADHESIONS, LAPAROSCOPIC (N/A)    Patient location: PACU    Anesthesia Type: general    Transport from OR: Transported from OR on room air with adequate spontaneous ventilation    Post pain: adequate analgesia    Post assessment: no apparent anesthetic complications and tolerated procedure well    Post vital signs: stable    Level of consciousness: awake and sedated    Nausea/Vomiting: no nausea/vomiting    Complications: none    Transfer of care protocol was followed      Last vitals:   Visit Vitals  BP (!) 141/90 (BP Location: Left arm, Patient Position: Lying)   Pulse 82   Temp 36.6 °C (97.9 °F) (Skin)   Resp 16   Ht 5' 1" (1.549 m)   Wt 54.4 kg (120 lb)   LMP 03/30/2015   SpO2 97%   Breastfeeding No   BMI 22.67 kg/m²     "

## 2020-07-09 NOTE — OR NURSING
Patient stuck for IV x4 by CRNA and Anesthesia MD. OK to use powerport per Dr. Munoz. Patient verbalizes understanding to plan of care.

## 2020-07-09 NOTE — DISCHARGE INSTRUCTIONS
LAPAROSCOPY    DOS:   Minimal activity for 48 hours.    Advance diet as tolerated   Expect some irregular menstrual bleeding.   Keep incisions clean.   Remove dressing in 48 hours   Showers only for _6_ weeks and pat incisions dry.   Resume home medications as prescribed    DONT:   No lifting more than 30 pounds for _3_ weeks.    Nothing in the vagina for _6_ weeks. No tampons, douching or intercourse.   No driving for 24 hours or while taking narcotic pain medication   Before driving, be sure you can press the brakes all the way to the floor without difficulty or pain.   DO NOT TAKE ADDITIONAL TYLENOL/ACETAMINOPHEN WHILE TAKING NARCOTIC PAIN MEDICATION THAT CONTAINS TYLENOL/ACETAMINOPHEN.    CALL PHYSICIAN FOR:   Heavy vaginal bleeding.   Fever>100.5   Persistent nausea and vomiting lasting > 12 hours   Redness, swelling, or drainage from incisions   Abdominal pain not improving day to day.    Make return appointment for 2 weeks at [phone]

## 2020-07-09 NOTE — TELEPHONE ENCOUNTER
Returned call to pharmacy, question concerning Percocet and Klonopin. Pt states Klonopin only prn, filled Percocet as prescribed

## 2020-07-09 NOTE — TELEPHONE ENCOUNTER
----- Message from Paul Mallory sent at 7/9/2020  8:32 AM CDT -----  Type:  Pharmacy Calling to Clarify an RX    Name of Caller:  Patient  Pharmacy Name:    [id number] - [place] - [address]  AT [facility] [address]  [address] [place] [address]  Phone: [phone] Fax: [phone]  Prescription Name:  oxyCODONE-acetaminophen (PERCOCET) 5-325 mg per tablet and clonazePAM (KLONOPIN) 1 MG tablet  What do they need to clarify?:  drug interaction  Best Call Back Number:  [phone]  Additional Information:  NA

## 2020-07-09 NOTE — OP NOTE
Operative Report:     SURGEON:   Gilson El MD, FACOG     ASSISTANT:  Paul Campbell MD     PREOPERATIVE DIAGNOSIS:    1.    Pelvic pain     POSTOPERATIVE DIAGNOSIS:    1.     Pelvic adhesions, exstensive     OPERATION:  Laparoscopic lysis of adhesions     ANESTHESIA:  General.     ESTIMATED BLOOD LOSS:   Minimal.     URINE OUTPUT:  Adequate pre- and post-procedure.     FLUIDS: 600cc crystalloid.     FINDINGS:  Absent uterus and ovaries. Extensive omental and small bowel adhesions in cul de sac. No adnexal mass     PROCEDURE IN DETAIL:  Patient was taken to the operating room, where general endotracheal anesthesia was found to be adequate.  Patient was prepped and draped in the usual sterile fashion in the dorsal lithotomy position in the [facility].  A weighted speculum was introduced into the vagina and the Hulka tenaculum was transfixed to the cervix.  Attention was then turned to the abdomen, where a 10-mm umbilical skin incision was made sharply with the scalpel and carried down until the rectus fascia was identified in the midline and scored.  The 5-mm bladeless trocar was introduced through this incision, and after intraabdominal placement was confirmed with the 5-mm laparoscope, approximately 3 liters of CO2 gas were used to insufflate the abdominal cavity.  An 5-mm suprapubic bladed trocar was introduced under direct laparoscopic visualization. Extensive adhesions were noted in cul de sac, pelvis, both pelvic gutters. Using both Harmonic scalpel as well as blunt dissection, the surgeons were able to release the majoraty of bowel adhesions. The cul de sac and peravaginal areas were uncovered and NO adnexal mass or free fluids was notedCounts being correct, the pt was extubated and transferred to the recovery room in stable condition.        D/C Summary     Preop Dx pelvic pain     Postop Dx  Same with pelvic omental adhesions     Dr Gilson El MD FACOG     D/C instructions  activity ad neftaly      Diet Regular     Meds Percocet 5mg po prn     Follow up 2 weeks Dr El

## 2020-07-09 NOTE — INTERVAL H&P NOTE
The patient has been examined and the H&P has been reviewed:    Reviewed previous encounter and no change in assessment. Discussed procedure, possible oophorectomy, possible open procedure, risks of bowel/bladder/ureretal injury, prolonged recovery. Answered all questions    Anesthesia/Surgery risks, benefits and alternative options discussed and understood by patient/family.          Active Hospital Problems    Diagnosis  POA    Pelvic pain in female [R10.2]  Yes      Resolved Hospital Problems   No resolved problems to display.

## 2020-07-10 VITALS
RESPIRATION RATE: 12 BRPM | DIASTOLIC BLOOD PRESSURE: 55 MMHG | BODY MASS INDEX: 22.66 KG/M2 | HEIGHT: 61 IN | TEMPERATURE: 98 F | HEART RATE: 75 BPM | OXYGEN SATURATION: 95 % | WEIGHT: 120 LBS | SYSTOLIC BLOOD PRESSURE: 107 MMHG

## 2020-07-13 RX ORDER — PROMETHAZINE HYDROCHLORIDE 25 MG/1
TABLET ORAL
Qty: 30 TABLET | Refills: 0 | Status: SHIPPED | OUTPATIENT
Start: 2020-07-13 | End: 2020-10-27 | Stop reason: SDUPTHER

## 2020-07-14 ENCOUNTER — PATIENT MESSAGE (OUTPATIENT)
Dept: OBSTETRICS AND GYNECOLOGY | Facility: CLINIC | Age: 40
End: 2020-07-14

## 2020-07-14 RX ORDER — OXYCODONE AND ACETAMINOPHEN 7.5; 325 MG/1; MG/1
1 TABLET ORAL EVERY 4 HOURS PRN
Qty: 10 TABLET | Refills: 0 | Status: SHIPPED | OUTPATIENT
Start: 2020-07-14 | End: 2020-09-18 | Stop reason: SDUPTHER

## 2020-07-23 ENCOUNTER — OFFICE VISIT (OUTPATIENT)
Dept: OBSTETRICS AND GYNECOLOGY | Facility: CLINIC | Age: 40
End: 2020-07-23
Payer: MEDICARE

## 2020-07-23 VITALS
SYSTOLIC BLOOD PRESSURE: 128 MMHG | WEIGHT: 127.63 LBS | HEIGHT: 61 IN | BODY MASS INDEX: 24.1 KG/M2 | DIASTOLIC BLOOD PRESSURE: 76 MMHG

## 2020-07-23 DIAGNOSIS — R10.2 PELVIC PAIN IN FEMALE: Primary | ICD-10-CM

## 2020-07-23 PROCEDURE — 99213 PR OFFICE/OUTPT VISIT, EST, LEVL III, 20-29 MIN: ICD-10-PCS | Mod: S$PBB,,, | Performed by: SPECIALIST

## 2020-07-23 PROCEDURE — 99214 OFFICE O/P EST MOD 30 MIN: CPT | Mod: PBBFAC,PN | Performed by: SPECIALIST

## 2020-07-23 PROCEDURE — 99999 PR PBB SHADOW E&M-EST. PATIENT-LVL IV: CPT | Mod: PBBFAC,,, | Performed by: SPECIALIST

## 2020-07-23 PROCEDURE — 99999 PR PBB SHADOW E&M-EST. PATIENT-LVL IV: ICD-10-PCS | Mod: PBBFAC,,, | Performed by: SPECIALIST

## 2020-07-23 PROCEDURE — 99213 OFFICE O/P EST LOW 20 MIN: CPT | Mod: S$PBB,,, | Performed by: SPECIALIST

## 2020-07-23 NOTE — PROGRESS NOTES
41 yo WF 2 weeks s/p extensive lap adhesiolysis for pelvic pain. Pt doing well and denies pain, f/c, n/v.   I reveiwed and discussed operative findings. Discussed adhesions     Past Medical History:   Diagnosis Date    Abnormal Pap smear 2007    Abnormal Pap smear 5/26/2011    Anemia     Anxiety     Arthritis     C. difficile diarrhea     Crohn's disease     Depression 8/5/2017    Encounter for blood transfusion     Genital HSV     History of colposcopy with cervical biopsy 2007 and 7/2011 2007-LYLA I  and 7/2011- LYLA I    Hypertension     Kidney stone     Kidney stone     Melanoma     Recurrent UTI 4/3/2013    S/P ileostomy 7/9/2012    Sterilization 6/23/2012       Past Surgical History:   Procedure Laterality Date    ABDOMINAL SURGERY      APPENDECTOMY      BILATERAL SALPINGO-OOPHORECTOMY (BSO) Bilateral 5/30/2019    Procedure: SALPINGO-OOPHORECTOMY, BILATERAL;  Surgeon: Rupa German MD;  Location: [facility] OR [address] [place] [address];  Service: OB/GYN;  Laterality: Bilateral;    BLADDER SURGERY      partial cystectomy due to fistula    CKC      COLON SURGERY      COLONOSCOPY      DIAGNOSTIC LAPAROSCOPY N/A 7/9/2020    Procedure: LAPAROSCOPY, DIAGNOSTIC;  Surgeon: Gilson El MD;  Location: [facility];  Service: OB/GYN;  Laterality: N/A;    EXCISION OF MELANOMA  07/17/2019    ILEOSTOMY      LAPAROSCOPIC LYSIS OF ADHESIONS N/A 7/9/2020    Procedure: LYSIS, ADHESIONS, LAPAROSCOPIC;  Surgeon: Gilson El MD;  Location: [facility];  Service: OB/GYN;  Laterality: N/A;    LYSIS OF ADHESIONS N/A 5/30/2019    Procedure: LYSIS, ADHESIONS;  Surgeon: Rupa German MD;  Location: [address];  Service: OB/GYN;  Laterality: N/A;    OOPHORECTOMY Right 04/16/2015    PORTACATH PLACEMENT  02/21/2017    SKIN BIOPSY      SMALL INTESTINE SURGERY      age 18 Y    TOTAL ABDOMINAL HYSTERECTOMY  04/16/2015    TOTAL COLECTOMY      TUBAL LIGATION  06/06/2012    UPPER GASTROINTESTINAL ENDOSCOPY          Family History   Problem Relation Age of Onset    Colon cancer Father     Cancer Father         colon cancer    Liver cancer Father     Hypertension Mother     Cancer Maternal Grandfather         esophageal      Skin cancer Maternal Grandfather     Endometrial cancer Maternal Aunt     Crohn's disease Brother     Breast cancer Neg Hx     Ovarian cancer Neg Hx     Melanoma Neg Hx        Social History     Socioeconomic History    Marital status: Single     Spouse name: Not on file    Number of children: Not on file    Years of education: Not on file    Highest education level: Not on file   Occupational History     Employer: [employer]   Social Needs    Financial resource strain: Not on file    Food insecurity     Worry: Not on file     Inability: Not on file    Transportation needs     Medical: Not on file     Non-medical: Not on file   Tobacco Use    Smoking status: Never Smoker    Smokeless tobacco: Never Used   Substance and Sexual Activity    Alcohol use: Not Currently     Alcohol/week: 0.0 standard drinks    Drug use: No    Sexual activity: Yes     Partners: Male     Birth control/protection: Surgical     Comment: HYST   Lifestyle    Physical activity     Days per week: Not on file     Minutes per session: Not on file    Stress: Not on file   Relationships    Social connections     Talks on phone: Not on file     Gets together: Not on file     Attends Jew service: Not on file     Active member of club or organization: Not on file     Attends meetings of clubs or organizations: Not on file     Relationship status: Not on file   Other Topics Concern    Are you pregnant or think you may be? No    Breast-feeding No   Social History Narrative    Not on file       Current Outpatient Medications   Medication Sig Dispense Refill    cephALEXin (KEFLEX) 250 MG capsule TAKE 1 CAPSULE(250 MG) BY MOUTH EVERY DAY 30 capsule 11    clonazePAM (KLONOPIN) 1 MG tablet Take  1 and 1/2 tablets po bid prn anxiety 90 tablet 0    diphenoxylate-atropine 2.5-0.025 mg (LOMOTIL) 2.5-0.025 mg per tablet Take 1 tablet by mouth 4 (four) times daily as needed. for diarrhea 100 tablet 0    diphenoxylate-atropine 2.5-0.025 mg (LOMOTIL) 2.5-0.025 mg per tablet Take 1 tablet by mouth 4 (four) times daily as needed. for diarrhea 100 tablet 1    dronabinoL (MARINOL) 5 MG capsule Take 1 capsule (5 mg total) by mouth 2 (two) times daily before meals. 60 capsule 0    famotidine (PEPCID) 20 MG tablet Take 20 mg by mouth 2 (two) times daily.      fluconazole (DIFLUCAN) 100 MG tablet TAKE 1 TABLET BY MOUTH ONCE DAILY 7 tablet 0    food supplemt, lactose-reduced (BOOST BREEZE NUTRITIONAL) 0.04-1.05 gram-kcal/mL Liqd Use 3 times per day 6399 mL 5    gabapentin (NEURONTIN) 300 MG capsule Take 1 capsule (300 mg total) by mouth once daily. After two weeks, take one twice daily 60 capsule 11    loperamide (IMODIUM) 2 mg capsule Take 2 mg by mouth daily as needed for Diarrhea.      mirtazapine (REMERON SOL-TAB) 30 MG disintegrating tablet Take 1 tablet (30 mg total) by mouth nightly. 90 tablet 3    multivitamin (ONE DAILY MULTIVITAMIN) per tablet Take 1 tablet by mouth once daily.      oxyCODONE-acetaminophen (PERCOCET) 5-325 mg per tablet Take 1 tablet by mouth every 4 (four) hours as needed for Pain. 20 tablet 0    oxyCODONE-acetaminophen (PERCOCET) 7.5-325 mg per tablet Take 1 tablet by mouth every 4 (four) hours as needed for Pain. 10 tablet 0    promethazine (PHENERGAN) 25 MG tablet TAKE 1 TABLET(25 MG) BY MOUTH EVERY 6 HOURS AS NEEDED 30 tablet 0    sumatriptan (IMITREX) 50 MG tablet Take 1 tab po prn migraine; may repeat once in 2 hours prn; do not exceed 2 tabs in 24 hours 12 tablet 0    traMADoL (ULTRAM) 50 mg tablet Take 1 tablet (50 mg total) by mouth every 6 (six) hours as needed for Pain. 10 tablet 0    valACYclovir (VALTREX) 500 MG tablet TAKE 1 TABLET BY MOUTH EVERY DAY 90 tablet 2     vedolizumab (ENTYVIO) 300 mg SolR injection Inject 300 mg into the vein.      zolpidem (AMBIEN) 10 mg Tab TAKE 1 TABLET BY MOUTH EVERY NIGHT AT BEDTIME 30 tablet 1    potassium citrate (UROCIT-K 15) 15 mEq TbSR Take 2 tablets by mouth 2 (two) times daily. 360 tablet 3     No current facility-administered medications for this visit.        Review of patient's allergies indicates:   Allergen Reactions    Azathioprine sodium Other (See Comments)     Other reaction(s): pancreatitis  Other reaction(s): pancreatitis    Methotrexate analogues Other (See Comments)     leukopenia    Stelara [ustekinumab] Other (See Comments)     Multiple infections    Zofran [ondansetron hcl (pf)] Other (See Comments)     Per patient causes prolong QT    Vancomycin analogues Hives    Morphine Itching and Other (See Comments)     Other reaction(s): Itching    Bactrim [sulfamethoxazole-trimethoprim] Palpitations    Ciprofloxacin Palpitations       Review of System:   General: no chills, fever, night sweats, weight gain or weight loss  Psychological: no depression or suicidal ideation  Breasts: no new or changing breast lumps, nipple discharge or masses.  Respiratory: no cough, shortness of breath, or wheezing  Cardiovascular: no chest pain or dyspnea on exertion  Gastrointestinal: no abdominal pain, change in bowel habits, or black or bloody stools  Genito-Urinary: no incontinence, urinary frequency/urgency or vulvar/vaginal symptoms, pelvic pain or abnormal vaginal bleeding.  Musculoskeletal: no gait disturbance or muscular weakness    Abd  Soft, incisons well approx  Sutures removed    Plan Release restrictions  RTO prn

## 2020-08-03 ENCOUNTER — OFFICE VISIT (OUTPATIENT)
Dept: DERMATOLOGY | Facility: CLINIC | Age: 40
End: 2020-08-03
Payer: MEDICARE

## 2020-08-03 ENCOUNTER — PATIENT MESSAGE (OUTPATIENT)
Dept: OBSTETRICS AND GYNECOLOGY | Facility: CLINIC | Age: 40
End: 2020-08-03

## 2020-08-03 ENCOUNTER — PATIENT MESSAGE (OUTPATIENT)
Dept: GASTROENTEROLOGY | Facility: CLINIC | Age: 40
End: 2020-08-03

## 2020-08-03 VITALS — TEMPERATURE: 98 F

## 2020-08-03 DIAGNOSIS — Z86.018 HISTORY OF DYSPLASTIC NEVUS: ICD-10-CM

## 2020-08-03 DIAGNOSIS — D22.30 MELANOCYTIC NEVI OF FACE: Primary | ICD-10-CM

## 2020-08-03 DIAGNOSIS — L81.4 LENTIGINES: ICD-10-CM

## 2020-08-03 DIAGNOSIS — D22.5 MULTIPLE BENIGN MELANOCYTIC NEVI OF UPPER EXTREMITY, LOWER EXTREMITY, AND TRUNK: ICD-10-CM

## 2020-08-03 DIAGNOSIS — K50.00 CROHN'S DISEASE OF SMALL INTESTINE WITHOUT COMPLICATION: Primary | Chronic | ICD-10-CM

## 2020-08-03 DIAGNOSIS — D22.70 MULTIPLE BENIGN MELANOCYTIC NEVI OF UPPER EXTREMITY, LOWER EXTREMITY, AND TRUNK: ICD-10-CM

## 2020-08-03 DIAGNOSIS — D22.60 MULTIPLE BENIGN MELANOCYTIC NEVI OF UPPER EXTREMITY, LOWER EXTREMITY, AND TRUNK: ICD-10-CM

## 2020-08-03 DIAGNOSIS — D18.01 ANGIOMA OF SKIN: ICD-10-CM

## 2020-08-03 PROCEDURE — 99214 OFFICE O/P EST MOD 30 MIN: CPT | Mod: S$GLB,,, | Performed by: DERMATOLOGY

## 2020-08-03 PROCEDURE — 99214 PR OFFICE/OUTPT VISIT, EST, LEVL IV, 30-39 MIN: ICD-10-PCS | Mod: S$GLB,,, | Performed by: DERMATOLOGY

## 2020-08-03 NOTE — PROGRESS NOTES
Subjective:       Patient ID:  Ivy Salgado is a 40 y.o. female who presents for   Chief Complaint   Patient presents with    Mole     diffuse     This is a high risk patient with a history of severely atypical nevus/early evolving melanoma in situ (right lower abdomen, 5/23/2019) who is here today to check for the development of new lesions.  Patient reports no changes or darkening of scar which is located beneath her ostomy bag.    Mole - Initial  Affected locations: diffuse  Duration: years.  Signs / symptoms: asymptomatic  Severity: mild  Timing: constant  Aggravated by: nothing  Relieving factors/Treatments tried: nothing      Review of Systems   Skin: Negative for itching and rash.   Hematologic/Lymphatic: Does not bruise/bleed easily.        Objective:    Physical Exam   Constitutional: She appears well-developed and well-nourished. No distress.   HENT:   Mouth/Throat: Lips normal.    Eyes: Lids are normal.  No conjunctival no injection.   Lymphadenopathy: No supraclavicular adenopathy is present.     She has no cervical adenopathy.     She has no axillary adenopathy.     She has no inguinal adenopathy.   Neurological: She is alert and oriented to person, place, and time. She is not disoriented.   Psychiatric: She has a normal mood and affect.   Skin:   Areas Examined (abnormalities noted in diagram):   Scalp / Hair Palpated and Inspected  Head / Face Inspection Performed  Neck Inspection Performed  Chest / Axilla Inspection Performed  Abdomen Inspection Performed  Genitals / Buttocks / Groin Inspection Performed  Back Inspection Performed  RUE Inspected  LUE Inspection Performed  RLE Inspected  LLE Inspection Performed  Nails and Digits Inspection Performed  Gland Inspection Performed                       Diagram Legend     Erythematous scaling macule/papule c/w actinic keratosis       Vascular papule c/w angioma      Pigmented verrucoid papule/plaque c/w seborrheic keratosis      Yellow umbilicated  papule c/w sebaceous hyperplasia      Irregularly shaped tan macule c/w lentigo     1-2 mm smooth white papules consistent with Milia      Movable subcutaneous cyst with punctum c/w epidermal inclusion cyst      Subcutaneous movable cyst c/w pilar cyst      Firm pink to brown papule c/w dermatofibroma      Pedunculated fleshy papule(s) c/w skin tag(s)      Evenly pigmented macule c/w junctional nevus     Mildly variegated pigmented, slightly irregular-bordered macule c/w mildly atypical nevus      Flesh colored to evenly pigmented papule c/w intradermal nevus       Pink pearly papule/plaque c/w basal cell carcinoma      Erythematous hyperkeratotic cursted plaque c/w SCC      Surgical scar with no sign of skin cancer recurrence      Open and closed comedones      Inflammatory papules and pustules      Verrucoid papule consistent consistent with wart     Erythematous eczematous patches and plaques     Dystrophic onycholytic nail with subungual debris c/w onychomycosis     Umbilicated papule    Erythematous-base heme-crusted tan verrucoid plaque consistent with inflamed seborrheic keratosis     Erythematous Silvery Scaling Plaque c/w Psoriasis     See annotation      Assessment / Plan:        Melanocytic nevi of face  Multiple benign melanocytic nevi of upper extremity, lower extremity, and trunk  Multiple benign-appearing nevi present on exam today. Reassurance provided. Counseled patient to periodically examine moles and return to clinic if any changes in size, shape, or color are noted or if it becomes symptomatic (bleeding, itching, pain, etc).    History of dysplastic nevus, severe atypia (early evolving melanoma in situ)  Area(s) of previous dysplastic nevus evaluated with no signs of recurrence. Reassurance provided.  Total body skin examination performed today including at least 12 points as noted in physical examination. No lesions suspicious for malignancy noted.  Patient instructed in importance of daily  broad-spectrum sunscreen use with SPF of at least 30. Sun avoidance and topical protection/protective clothing discussed.    Lentigines  These are benign sun spots which should be monitored for changes. Daily sun protection will reduce the number of new lesions.   Patient instructed in importance of daily broad-spectrum sunscreen use with SPF of at least 30. Sun avoidance and topical protection/protective clothing discussed.    Angioma of skin  This is a benign vascular lesion. Reassurance given. No treatment required.      Follow up in about 6 months (around 2/3/2021) for TBSE, or sooner if any new problems or changing lesions.

## 2020-08-05 ENCOUNTER — TELEPHONE (OUTPATIENT)
Dept: OBSTETRICS AND GYNECOLOGY | Facility: CLINIC | Age: 40
End: 2020-08-05

## 2020-08-05 NOTE — TELEPHONE ENCOUNTER
----- Message from Stephen Carbajal sent at 8/5/2020  3:26 PM CDT -----  Regarding: [facility]  Type:  Pharmacy Calling to Clarify an RX    Name of Caller:  Sameera  [facility]  Prescription Name:  Bi-Est Cream  What do they need to clarify?:  Pharm Faxed Rx to Drs Office Fax was returned not filled out  Best Call Back Number:  [phone]  Additional Information:  please return complted fax if still on hand, if not call for new fax

## 2020-08-06 ENCOUNTER — LAB VISIT (OUTPATIENT)
Dept: LAB | Facility: HOSPITAL | Age: 40
End: 2020-08-06
Attending: INTERNAL MEDICINE
Payer: MEDICARE

## 2020-08-06 ENCOUNTER — OFFICE VISIT (OUTPATIENT)
Dept: OPTOMETRY | Facility: CLINIC | Age: 40
End: 2020-08-06
Payer: MEDICARE

## 2020-08-06 ENCOUNTER — PATIENT MESSAGE (OUTPATIENT)
Dept: OBSTETRICS AND GYNECOLOGY | Facility: CLINIC | Age: 40
End: 2020-08-06

## 2020-08-06 DIAGNOSIS — Z01.00 EYE EXAM, ROUTINE: Primary | ICD-10-CM

## 2020-08-06 DIAGNOSIS — H52.13 MYOPIA OF BOTH EYES: ICD-10-CM

## 2020-08-06 DIAGNOSIS — K50.00 CROHN'S DISEASE OF SMALL INTESTINE WITHOUT COMPLICATION: Chronic | ICD-10-CM

## 2020-08-06 PROCEDURE — 36415 COLL VENOUS BLD VENIPUNCTURE: CPT

## 2020-08-06 PROCEDURE — 99999 PR PBB SHADOW E&M-EST. PATIENT-LVL III: ICD-10-PCS | Mod: PBBFAC,,, | Performed by: OPTOMETRIST

## 2020-08-06 PROCEDURE — 92015 PR REFRACTION: ICD-10-PCS | Mod: ,,, | Performed by: OPTOMETRIST

## 2020-08-06 PROCEDURE — 86480 TB TEST CELL IMMUN MEASURE: CPT

## 2020-08-06 PROCEDURE — 92014 COMPRE OPH EXAM EST PT 1/>: CPT | Mod: S$PBB,,, | Performed by: OPTOMETRIST

## 2020-08-06 PROCEDURE — 99213 OFFICE O/P EST LOW 20 MIN: CPT | Mod: PBBFAC | Performed by: OPTOMETRIST

## 2020-08-06 PROCEDURE — 92014 PR EYE EXAM, EST PATIENT,COMPREHESV: ICD-10-PCS | Mod: S$PBB,,, | Performed by: OPTOMETRIST

## 2020-08-06 PROCEDURE — 92015 DETERMINE REFRACTIVE STATE: CPT | Mod: ,,, | Performed by: OPTOMETRIST

## 2020-08-06 PROCEDURE — 99999 PR PBB SHADOW E&M-EST. PATIENT-LVL III: CPT | Mod: PBBFAC,,, | Performed by: OPTOMETRIST

## 2020-08-06 RX ORDER — FLUCONAZOLE 200 MG/1
200 TABLET ORAL ONCE
Qty: 1 TABLET | Refills: 0 | Status: SHIPPED | OUTPATIENT
Start: 2020-08-06 | End: 2020-08-06

## 2020-08-06 NOTE — PROGRESS NOTES
HPI     Patient is here for annual eye exam  Blurry vision at night.  Much more near work/computer with back to school   (-)Flashes (-)Floaters  (-)Itch, (+)tear at bed time, (-)burn, (-)Dryness. (-) OTC Drops   (-)Photophobia  (-)Glare (-)diplopia (+) headaches          Last edited by Dony Coffey, OD on 8/6/2020  1:51 PM. (History)            Assessment /Plan     For exam results, see Encounter Report.    Eye exam, routine  -Near visual strain reviewed near strains impact on Distance  -Annual DFE    Myopia of both eyes  Eyeglass Final Rx     Eyeglass Final Rx       Sphere Cylinder    Right +0.50 Sphere    Left +0.50 Sphere    Type: COMPUTER    Expiration Date: 8/7/2021              Computer only    RTC 1 yr

## 2020-08-10 LAB
GAMMA INTERFERON BACKGROUND BLD IA-ACNC: 0.02 IU/ML
M TB IFN-G CD4+ BCKGRND COR BLD-ACNC: 0 IU/ML
MITOGEN IGNF BCKGRD COR BLD-ACNC: >10 IU/ML
TB GOLD PLUS: NEGATIVE
TB2 - NIL: 0 IU/ML

## 2020-08-12 DIAGNOSIS — K50.00 CROHN'S DISEASE OF SMALL INTESTINE WITHOUT COMPLICATION: Primary | Chronic | ICD-10-CM

## 2020-08-12 RX ORDER — ACETAMINOPHEN 325 MG/1
650 TABLET ORAL
Status: CANCELLED | OUTPATIENT
Start: 2020-08-12

## 2020-08-12 RX ORDER — DIPHENHYDRAMINE HYDROCHLORIDE 50 MG/ML
25 INJECTION INTRAMUSCULAR; INTRAVENOUS
Status: CANCELLED | OUTPATIENT
Start: 2020-08-12

## 2020-08-12 RX ORDER — EPINEPHRINE 1 MG/ML
0.3 INJECTION, SOLUTION, CONCENTRATE INTRAVENOUS
Status: CANCELLED | OUTPATIENT
Start: 2020-08-12

## 2020-08-12 RX ORDER — SODIUM CHLORIDE 0.9 % (FLUSH) 0.9 %
10 SYRINGE (ML) INJECTION
Status: CANCELLED | OUTPATIENT
Start: 2020-08-12

## 2020-08-12 RX ORDER — IPRATROPIUM BROMIDE AND ALBUTEROL SULFATE 2.5; .5 MG/3ML; MG/3ML
3 SOLUTION RESPIRATORY (INHALATION)
Status: CANCELLED | OUTPATIENT
Start: 2020-08-12

## 2020-08-12 RX ORDER — METHYLPREDNISOLONE SOD SUCC 125 MG
40 VIAL (EA) INJECTION
Status: CANCELLED | OUTPATIENT
Start: 2020-08-12

## 2020-08-12 RX ORDER — HEPARIN 100 UNIT/ML
500 SYRINGE INTRAVENOUS
Status: CANCELLED | OUTPATIENT
Start: 2020-08-12

## 2020-08-25 ENCOUNTER — PATIENT MESSAGE (OUTPATIENT)
Dept: UROLOGY | Facility: CLINIC | Age: 40
End: 2020-08-25

## 2020-08-25 DIAGNOSIS — R39.15 URINARY URGENCY: Primary | ICD-10-CM

## 2020-08-26 ENCOUNTER — LAB VISIT (OUTPATIENT)
Dept: LAB | Facility: HOSPITAL | Age: 40
End: 2020-08-26
Payer: MEDICARE

## 2020-08-26 DIAGNOSIS — R39.15 URINARY URGENCY: ICD-10-CM

## 2020-08-26 PROCEDURE — 87086 URINE CULTURE/COLONY COUNT: CPT

## 2020-08-26 PROCEDURE — 87186 SC STD MICRODIL/AGAR DIL: CPT

## 2020-08-26 PROCEDURE — 87077 CULTURE AEROBIC IDENTIFY: CPT

## 2020-08-26 PROCEDURE — 87088 URINE BACTERIA CULTURE: CPT

## 2020-08-28 ENCOUNTER — PATIENT MESSAGE (OUTPATIENT)
Dept: UROLOGY | Facility: CLINIC | Age: 40
End: 2020-08-28

## 2020-08-28 DIAGNOSIS — B37.31 VAGINAL YEAST INFECTION: Primary | ICD-10-CM

## 2020-08-28 DIAGNOSIS — N30.00 ACUTE CYSTITIS WITHOUT HEMATURIA: Primary | ICD-10-CM

## 2020-08-28 DIAGNOSIS — F41.1 GENERALIZED ANXIETY DISORDER: ICD-10-CM

## 2020-08-28 LAB — BACTERIA UR CULT: ABNORMAL

## 2020-08-28 RX ORDER — FLUCONAZOLE 150 MG/1
150 TABLET ORAL
Qty: 3 TABLET | Refills: 0 | Status: SHIPPED | OUTPATIENT
Start: 2020-08-28 | End: 2021-04-20 | Stop reason: SDUPTHER

## 2020-08-28 RX ORDER — CLONAZEPAM 1 MG/1
TABLET ORAL
Qty: 90 TABLET | Refills: 0 | Status: CANCELLED | OUTPATIENT
Start: 2020-08-28

## 2020-08-28 RX ORDER — NITROFURANTOIN 25; 75 MG/1; MG/1
100 CAPSULE ORAL 2 TIMES DAILY
Qty: 14 CAPSULE | Refills: 0 | Status: SHIPPED | OUTPATIENT
Start: 2020-08-28 | End: 2020-09-04

## 2020-08-28 RX ORDER — CEPHALEXIN 500 MG/1
500 CAPSULE ORAL EVERY 12 HOURS
Qty: 14 CAPSULE | Refills: 0 | Status: SHIPPED | OUTPATIENT
Start: 2020-08-28 | End: 2020-09-04

## 2020-08-31 ENCOUNTER — INFUSION (OUTPATIENT)
Dept: INFECTIOUS DISEASES | Facility: HOSPITAL | Age: 40
End: 2020-08-31
Attending: INTERNAL MEDICINE
Payer: MEDICARE

## 2020-08-31 VITALS
WEIGHT: 126 LBS | OXYGEN SATURATION: 98 % | SYSTOLIC BLOOD PRESSURE: 163 MMHG | BODY MASS INDEX: 23.79 KG/M2 | HEIGHT: 61 IN | DIASTOLIC BLOOD PRESSURE: 94 MMHG | TEMPERATURE: 99 F | RESPIRATION RATE: 16 BRPM | HEART RATE: 97 BPM

## 2020-08-31 DIAGNOSIS — K50.00 CROHN'S DISEASE OF SMALL INTESTINE WITHOUT COMPLICATION: Primary | ICD-10-CM

## 2020-08-31 PROCEDURE — 25000003 PHARM REV CODE 250: Performed by: INTERNAL MEDICINE

## 2020-08-31 PROCEDURE — 96360 HYDRATION IV INFUSION INIT: CPT

## 2020-08-31 RX ORDER — HEPARIN 100 UNIT/ML
500 SYRINGE INTRAVENOUS
Status: CANCELLED | OUTPATIENT
Start: 2020-10-01

## 2020-08-31 RX ORDER — SODIUM CHLORIDE 0.9 % (FLUSH) 0.9 %
10 SYRINGE (ML) INJECTION
Status: DISCONTINUED | OUTPATIENT
Start: 2020-08-31 | End: 2020-08-31 | Stop reason: HOSPADM

## 2020-08-31 RX ORDER — IPRATROPIUM BROMIDE AND ALBUTEROL SULFATE 2.5; .5 MG/3ML; MG/3ML
3 SOLUTION RESPIRATORY (INHALATION)
Status: CANCELLED | OUTPATIENT
Start: 2020-10-01

## 2020-08-31 RX ORDER — METHYLPREDNISOLONE SOD SUCC 125 MG
40 VIAL (EA) INJECTION
Status: CANCELLED | OUTPATIENT
Start: 2020-10-01

## 2020-08-31 RX ORDER — EPINEPHRINE 1 MG/ML
0.3 INJECTION, SOLUTION, CONCENTRATE INTRAVENOUS
Status: CANCELLED | OUTPATIENT
Start: 2020-10-01

## 2020-08-31 RX ORDER — DIPHENHYDRAMINE HYDROCHLORIDE 50 MG/ML
25 INJECTION INTRAMUSCULAR; INTRAVENOUS
Status: CANCELLED | OUTPATIENT
Start: 2020-10-01

## 2020-08-31 RX ORDER — SODIUM CHLORIDE 0.9 % (FLUSH) 0.9 %
10 SYRINGE (ML) INJECTION
Status: CANCELLED | OUTPATIENT
Start: 2020-10-01

## 2020-08-31 RX ORDER — HEPARIN 100 UNIT/ML
500 SYRINGE INTRAVENOUS
Status: DISCONTINUED | OUTPATIENT
Start: 2020-08-31 | End: 2020-08-31 | Stop reason: HOSPADM

## 2020-08-31 RX ORDER — ACETAMINOPHEN 325 MG/1
650 TABLET ORAL
Status: CANCELLED | OUTPATIENT
Start: 2020-10-01

## 2020-08-31 RX ADMIN — SODIUM CHLORIDE 1000 ML: 0.9 INJECTION, SOLUTION INTRAVENOUS at 01:08

## 2020-08-31 NOTE — PROGRESS NOTES
Patient arrived for Normal Saline over one hour.  Patient tolerated infusion well and left in NAD.

## 2020-09-12 ENCOUNTER — PATIENT OUTREACH (OUTPATIENT)
Dept: ADMINISTRATIVE | Facility: OTHER | Age: 40
End: 2020-09-12

## 2020-09-13 NOTE — PROGRESS NOTES
Health Maintenance Due   Topic Date Due    Influenza Vaccine (1) 08/01/2020     Updates were requested from care everywhere.  Chart was reviewed for overdue Proactive [facility] Encounters (LEISA) topics (CRS, Breast Cancer Screening, Eye exam)  Health Maintenance has been updated.  LINKS immunization registry triggered.  Immunizations were reconciled.

## 2020-09-14 ENCOUNTER — OFFICE VISIT (OUTPATIENT)
Dept: UROLOGY | Facility: CLINIC | Age: 40
End: 2020-09-14
Attending: UROLOGY
Payer: MEDICARE

## 2020-09-14 VITALS
HEIGHT: 61 IN | SYSTOLIC BLOOD PRESSURE: 131 MMHG | HEART RATE: 115 BPM | BODY MASS INDEX: 23.6 KG/M2 | WEIGHT: 125 LBS | DIASTOLIC BLOOD PRESSURE: 82 MMHG

## 2020-09-14 DIAGNOSIS — R39.9 UTI SYMPTOMS: ICD-10-CM

## 2020-09-14 DIAGNOSIS — N39.0 RECURRENT UTI: Primary | ICD-10-CM

## 2020-09-14 LAB
BILIRUB SERPL-MCNC: ABNORMAL MG/DL
BLOOD URINE, POC: 250
CLARITY, POC UA: ABNORMAL
COLOR, POC UA: ABNORMAL
GLUCOSE UR QL STRIP: NORMAL
KETONES UR QL STRIP: ABNORMAL
LEUKOCYTE ESTERASE URINE, POC: ABNORMAL
NITRITE, POC UA: ABNORMAL
PH, POC UA: 5
PROTEIN, POC: ABNORMAL
SPECIFIC GRAVITY, POC UA: 1.02
UROBILINOGEN, POC UA: NORMAL

## 2020-09-14 PROCEDURE — 81002 POCT URINE DIPSTICK WITHOUT MICROSCOPE: ICD-10-PCS | Mod: S$GLB,,, | Performed by: UROLOGY

## 2020-09-14 PROCEDURE — 99213 PR OFFICE/OUTPT VISIT, EST, LEVL III, 20-29 MIN: ICD-10-PCS | Mod: 25,S$GLB,, | Performed by: UROLOGY

## 2020-09-14 PROCEDURE — 81002 URINALYSIS NONAUTO W/O SCOPE: CPT | Mod: S$GLB,,, | Performed by: UROLOGY

## 2020-09-14 PROCEDURE — 87086 URINE CULTURE/COLONY COUNT: CPT

## 2020-09-14 PROCEDURE — 99213 OFFICE O/P EST LOW 20 MIN: CPT | Mod: 25,S$GLB,, | Performed by: UROLOGY

## 2020-09-14 NOTE — PROGRESS NOTES
"Subjective:      Ivy Salgado is a 40 y.o. female who returns today regarding her UTIs.    Full history detailed in prior notes.    She had a recent UTI, treated per culture, her first in several months. She stopped Keflex ppx as she is no longer taking biologic medication for Crohn's disease.    She has some dysuria today concerning for recurrent infection.    The following portions of the patient's history were reviewed and updated as appropriate: allergies, current medications, past family history, past medical history, past social history, past surgical history and problem list.    Review of Systems  A comprehensive multipoint review of systems was negative except as otherwise stated in the HPI.     Objective:   Vitals: /82 (BP Location: Right arm, Patient Position: Sitting, BP Method: Large (Automatic))   Pulse (!) 115   Ht 5' 1" (1.549 m)   Wt 56.7 kg (125 lb)   LMP 03/30/2015   BMI 23.62 kg/m²     Physical Exam   General: alert and oriented, no acute distress  Respiratory: Symmetric expansion, non-labored breathing  Neuro: no gross deficits  Psych: normal judgment and insight, normal mood/affect and non-anxious    Lab Review   Urinalysis demonstrates positive for leukocytes, red blood cells   Lab Results   Component Value Date    WBC 7.24 07/08/2020    HGB 14.1 07/08/2020    HCT 44.7 07/08/2020    MCV 93 07/08/2020     07/08/2020     Lab Results   Component Value Date    CREATININE 0.8 03/11/2020    BUN 16 03/11/2020     Date: 11/2/17 -- Vol 0.50 L, Ca 95 mg/day, Ox 11 mg/day, Cit 132 mg/day, pH 5.319, Na 21, K 20, Mg 18, Cr 796     Imaging       Assessment and Plan:   1. History of recurrent UTIs  -- Continue to hold prophylactic abx (Keflex 250 daily) while off Crohn's treatment  -- Urine culture today; treat as indicated    2. Nephrolithiasis  -- Minimal stone burden on recent CT  -- Unlikely contributing to UTIs  -- Continue UroCit K 30 mEq BID    3. Low urine volume   4. " Hypocitraturia  5. Low urine pH  -- Previously addressed

## 2020-09-15 ENCOUNTER — ANESTHESIA (OUTPATIENT)
Dept: SURGERY | Facility: HOSPITAL | Age: 40
End: 2020-09-15
Payer: MEDICARE

## 2020-09-15 ENCOUNTER — OFFICE VISIT (OUTPATIENT)
Dept: URGENT CARE | Facility: CLINIC | Age: 40
End: 2020-09-15
Payer: MEDICARE

## 2020-09-15 ENCOUNTER — ANESTHESIA EVENT (OUTPATIENT)
Dept: SURGERY | Facility: HOSPITAL | Age: 40
End: 2020-09-15
Payer: MEDICARE

## 2020-09-15 ENCOUNTER — HOSPITAL ENCOUNTER (OUTPATIENT)
Facility: HOSPITAL | Age: 40
Discharge: HOME | End: 2020-09-16
Attending: EMERGENCY MEDICINE | Admitting: UROLOGY
Payer: MEDICARE

## 2020-09-15 VITALS
BODY MASS INDEX: 23.6 KG/M2 | RESPIRATION RATE: 16 BRPM | HEART RATE: 81 BPM | WEIGHT: 125 LBS | TEMPERATURE: 98 F | SYSTOLIC BLOOD PRESSURE: 134 MMHG | HEIGHT: 61 IN | DIASTOLIC BLOOD PRESSURE: 90 MMHG | OXYGEN SATURATION: 97 %

## 2020-09-15 DIAGNOSIS — R10.9 FLANK PAIN: ICD-10-CM

## 2020-09-15 DIAGNOSIS — R31.9 HEMATURIA, UNSPECIFIED TYPE: ICD-10-CM

## 2020-09-15 DIAGNOSIS — R50.9 FEVER, UNSPECIFIED FEVER CAUSE: ICD-10-CM

## 2020-09-15 DIAGNOSIS — Z87.440 HISTORY OF RECURRENT UTI (URINARY TRACT INFECTION): ICD-10-CM

## 2020-09-15 DIAGNOSIS — N20.1 LEFT URETERAL STONE: ICD-10-CM

## 2020-09-15 DIAGNOSIS — N13.30 HYDRONEPHROSIS, UNSPECIFIED HYDRONEPHROSIS TYPE: ICD-10-CM

## 2020-09-15 DIAGNOSIS — N23 RENAL COLIC ON LEFT SIDE: ICD-10-CM

## 2020-09-15 DIAGNOSIS — N20.1 URETERAL STONE: Primary | ICD-10-CM

## 2020-09-15 DIAGNOSIS — R10.9 FLANK PAIN: Primary | ICD-10-CM

## 2020-09-15 LAB
ALBUMIN SERPL BCP-MCNC: 3.6 G/DL (ref 3.5–5.2)
ALP SERPL-CCNC: 91 U/L (ref 55–135)
ALT SERPL W/O P-5'-P-CCNC: 24 U/L (ref 10–44)
ANION GAP SERPL CALC-SCNC: 12 MMOL/L (ref 8–16)
AST SERPL-CCNC: 27 U/L (ref 10–40)
BACTERIA #/AREA URNS AUTO: ABNORMAL /HPF
BACTERIA UR CULT: NO GROWTH
BASOPHILS # BLD AUTO: 0.02 K/UL (ref 0–0.2)
BASOPHILS NFR BLD: 0.5 % (ref 0–1.9)
BILIRUB SERPL-MCNC: 0.4 MG/DL (ref 0.1–1)
BILIRUB UR QL STRIP: NEGATIVE
BILIRUB UR QL STRIP: NEGATIVE
BUN SERPL-MCNC: 9 MG/DL (ref 6–20)
CALCIUM SERPL-MCNC: 8.6 MG/DL (ref 8.7–10.5)
CHLORIDE SERPL-SCNC: 107 MMOL/L (ref 95–110)
CLARITY UR REFRACT.AUTO: ABNORMAL
CO2 SERPL-SCNC: 19 MMOL/L (ref 23–29)
COLOR UR AUTO: YELLOW
CREAT SERPL-MCNC: 0.7 MG/DL (ref 0.5–1.4)
CRP SERPL-MCNC: 80.3 MG/L (ref 0–8.2)
CTP QC/QA: YES
DIFFERENTIAL METHOD: ABNORMAL
EOSINOPHIL # BLD AUTO: 0 K/UL (ref 0–0.5)
EOSINOPHIL NFR BLD: 0.2 % (ref 0–8)
ERYTHROCYTE [DISTWIDTH] IN BLOOD BY AUTOMATED COUNT: 13.4 % (ref 11.5–14.5)
ERYTHROCYTE [SEDIMENTATION RATE] IN BLOOD BY WESTERGREN METHOD: 51 MM/HR (ref 0–36)
EST. GFR  (AFRICAN AMERICAN): >60 ML/MIN/1.73 M^2
EST. GFR  (NON AFRICAN AMERICAN): >60 ML/MIN/1.73 M^2
GLUCOSE SERPL-MCNC: 81 MG/DL (ref 70–110)
GLUCOSE UR QL STRIP: NEGATIVE
GLUCOSE UR QL STRIP: NEGATIVE
HCT VFR BLD AUTO: 41.5 % (ref 37–48.5)
HGB BLD-MCNC: 13.4 G/DL (ref 12–16)
HGB UR QL STRIP: ABNORMAL
IMM GRANULOCYTES # BLD AUTO: 0 K/UL (ref 0–0.04)
IMM GRANULOCYTES NFR BLD AUTO: 0 % (ref 0–0.5)
KETONES UR QL STRIP: ABNORMAL
KETONES UR QL STRIP: POSITIVE
LACTATE SERPL-SCNC: 1.7 MMOL/L (ref 0.5–2.2)
LEUKOCYTE ESTERASE UR QL STRIP: NEGATIVE
LEUKOCYTE ESTERASE UR QL STRIP: NEGATIVE
LYMPHOCYTES # BLD AUTO: 1.2 K/UL (ref 1–4.8)
LYMPHOCYTES NFR BLD: 28.8 % (ref 18–48)
MCH RBC QN AUTO: 30 PG (ref 27–31)
MCHC RBC AUTO-ENTMCNC: 32.3 G/DL (ref 32–36)
MCV RBC AUTO: 93 FL (ref 82–98)
MICROSCOPIC COMMENT: ABNORMAL
MONOCYTES # BLD AUTO: 0.8 K/UL (ref 0.3–1)
MONOCYTES NFR BLD: 19.5 % (ref 4–15)
NEUTROPHILS # BLD AUTO: 2.1 K/UL (ref 1.8–7.7)
NEUTROPHILS NFR BLD: 51 % (ref 38–73)
NITRITE UR QL STRIP: NEGATIVE
NRBC BLD-RTO: 0 /100 WBC
PH UR STRIP: 5 [PH] (ref 5–8)
PH, POC UA: 5 (ref 5–8)
PLATELET # BLD AUTO: 223 K/UL (ref 150–350)
PMV BLD AUTO: 9.8 FL (ref 9.2–12.9)
POC BLOOD, URINE: POSITIVE
POC NITRATES, URINE: NEGATIVE
POTASSIUM SERPL-SCNC: 3.2 MMOL/L (ref 3.5–5.1)
PROT SERPL-MCNC: 7.6 G/DL (ref 6–8.4)
PROT UR QL STRIP: NEGATIVE
PROT UR QL STRIP: POSITIVE
RBC # BLD AUTO: 4.47 M/UL (ref 4–5.4)
RBC #/AREA URNS AUTO: 32 /HPF (ref 0–4)
SARS-COV-2 RDRP RESP QL NAA+PROBE: NEGATIVE
SODIUM SERPL-SCNC: 138 MMOL/L (ref 136–145)
SP GR UR STRIP: 1 (ref 1–1.03)
SP GR UR STRIP: 1.02 (ref 1–1.03)
SQUAMOUS #/AREA URNS AUTO: 2 /HPF
URN SPEC COLLECT METH UR: ABNORMAL
UROBILINOGEN UR STRIP-ACNC: NORMAL (ref 0.1–1.1)
WBC # BLD AUTO: 4.16 K/UL (ref 3.9–12.7)
WBC #/AREA URNS AUTO: 2 /HPF (ref 0–5)

## 2020-09-15 PROCEDURE — 99285 EMERGENCY DEPT VISIT HI MDM: CPT | Mod: 25

## 2020-09-15 PROCEDURE — U0002: ICD-10-PCS | Mod: QW,S$GLB,, | Performed by: FAMILY MEDICINE

## 2020-09-15 PROCEDURE — 86140 C-REACTIVE PROTEIN: CPT

## 2020-09-15 PROCEDURE — 96374 THER/PROPH/DIAG INJ IV PUSH: CPT

## 2020-09-15 PROCEDURE — G0378 HOSPITAL OBSERVATION PER HR: HCPCS

## 2020-09-15 PROCEDURE — 63600175 PHARM REV CODE 636 W HCPCS: Performed by: ANESTHESIOLOGY

## 2020-09-15 PROCEDURE — 99213 PR OFFICE/OUTPT VISIT, EST, LEVL III, 20-29 MIN: ICD-10-PCS | Mod: 25,S$GLB,, | Performed by: FAMILY MEDICINE

## 2020-09-15 PROCEDURE — 99285 PR EMERGENCY DEPT VISIT,LEVEL V: ICD-10-PCS | Mod: ,,, | Performed by: PHYSICIAN ASSISTANT

## 2020-09-15 PROCEDURE — 96375 TX/PRO/DX INJ NEW DRUG ADDON: CPT

## 2020-09-15 PROCEDURE — 63600175 PHARM REV CODE 636 W HCPCS: Performed by: PHYSICIAN ASSISTANT

## 2020-09-15 PROCEDURE — 87040 BLOOD CULTURE FOR BACTERIA: CPT | Mod: 59

## 2020-09-15 PROCEDURE — 63600175 PHARM REV CODE 636 W HCPCS: Performed by: EMERGENCY MEDICINE

## 2020-09-15 PROCEDURE — D9220A PRA ANESTHESIA: Mod: ANES,,, | Performed by: ANESTHESIOLOGY

## 2020-09-15 PROCEDURE — 36000706: Performed by: UROLOGY

## 2020-09-15 PROCEDURE — 80053 COMPREHEN METABOLIC PANEL: CPT

## 2020-09-15 PROCEDURE — 25000003 PHARM REV CODE 250: Performed by: REGISTERED NURSE

## 2020-09-15 PROCEDURE — 99213 OFFICE O/P EST LOW 20 MIN: CPT | Mod: 25,S$GLB,, | Performed by: FAMILY MEDICINE

## 2020-09-15 PROCEDURE — 52332 CYSTOSCOPY AND TREATMENT: CPT | Mod: LT,,, | Performed by: UROLOGY

## 2020-09-15 PROCEDURE — 99285 EMERGENCY DEPT VISIT HI MDM: CPT | Mod: ,,, | Performed by: PHYSICIAN ASSISTANT

## 2020-09-15 PROCEDURE — 37000008 HC ANESTHESIA 1ST 15 MINUTES: Performed by: UROLOGY

## 2020-09-15 PROCEDURE — 37000009 HC ANESTHESIA EA ADD 15 MINS: Performed by: UROLOGY

## 2020-09-15 PROCEDURE — C2617 STENT, NON-COR, TEM W/O DEL: HCPCS | Performed by: UROLOGY

## 2020-09-15 PROCEDURE — 93010 EKG 12-LEAD: ICD-10-PCS | Mod: ,,, | Performed by: INTERNAL MEDICINE

## 2020-09-15 PROCEDURE — 87086 URINE CULTURE/COLONY COUNT: CPT

## 2020-09-15 PROCEDURE — D9220A PRA ANESTHESIA: ICD-10-PCS | Mod: ANES,,, | Performed by: ANESTHESIOLOGY

## 2020-09-15 PROCEDURE — D9220A PRA ANESTHESIA: Mod: CRNA,,, | Performed by: REGISTERED NURSE

## 2020-09-15 PROCEDURE — 25000003 PHARM REV CODE 250: Performed by: STUDENT IN AN ORGANIZED HEALTH CARE EDUCATION/TRAINING PROGRAM

## 2020-09-15 PROCEDURE — 36000707: Performed by: UROLOGY

## 2020-09-15 PROCEDURE — 25000003 PHARM REV CODE 250: Performed by: PHYSICIAN ASSISTANT

## 2020-09-15 PROCEDURE — 93005 ELECTROCARDIOGRAM TRACING: CPT

## 2020-09-15 PROCEDURE — 85025 COMPLETE CBC W/AUTO DIFF WBC: CPT

## 2020-09-15 PROCEDURE — 63600175 PHARM REV CODE 636 W HCPCS: Performed by: STUDENT IN AN ORGANIZED HEALTH CARE EDUCATION/TRAINING PROGRAM

## 2020-09-15 PROCEDURE — 93010 ELECTROCARDIOGRAM REPORT: CPT | Mod: ,,, | Performed by: INTERNAL MEDICINE

## 2020-09-15 PROCEDURE — D9220A PRA ANESTHESIA: ICD-10-PCS | Mod: CRNA,,, | Performed by: REGISTERED NURSE

## 2020-09-15 PROCEDURE — 85652 RBC SED RATE AUTOMATED: CPT

## 2020-09-15 PROCEDURE — 81003 URINALYSIS AUTO W/O SCOPE: CPT | Mod: QW,S$GLB,, | Performed by: FAMILY MEDICINE

## 2020-09-15 PROCEDURE — 00910 ANES TRANSURETHRAL PX NOS: CPT | Performed by: UROLOGY

## 2020-09-15 PROCEDURE — 71000033 HC RECOVERY, INTIAL HOUR: Performed by: UROLOGY

## 2020-09-15 PROCEDURE — 81003 POCT URINALYSIS, DIPSTICK, AUTOMATED, W/O SCOPE: ICD-10-PCS | Mod: QW,S$GLB,, | Performed by: FAMILY MEDICINE

## 2020-09-15 PROCEDURE — U0002 COVID-19 LAB TEST NON-CDC: HCPCS | Mod: QW,S$GLB,, | Performed by: FAMILY MEDICINE

## 2020-09-15 PROCEDURE — 25500020 PHARM REV CODE 255: Performed by: EMERGENCY MEDICINE

## 2020-09-15 PROCEDURE — C1769 GUIDE WIRE: HCPCS | Performed by: UROLOGY

## 2020-09-15 PROCEDURE — 87086 URINE CULTURE/COLONY COUNT: CPT | Mod: 59

## 2020-09-15 PROCEDURE — S0028 INJECTION, FAMOTIDINE, 20 MG: HCPCS | Performed by: REGISTERED NURSE

## 2020-09-15 PROCEDURE — 83605 ASSAY OF LACTIC ACID: CPT

## 2020-09-15 PROCEDURE — 81001 URINALYSIS AUTO W/SCOPE: CPT

## 2020-09-15 PROCEDURE — 52332 PR CYSTOSCOPY,INSERT URETERAL STENT: ICD-10-PCS | Mod: LT,,, | Performed by: UROLOGY

## 2020-09-15 PROCEDURE — 63600175 PHARM REV CODE 636 W HCPCS: Performed by: REGISTERED NURSE

## 2020-09-15 DEVICE — STENT URETERAL UNIV 6FR 24CM: Type: IMPLANTABLE DEVICE | Site: URETER | Status: FUNCTIONAL

## 2020-09-15 RX ORDER — FAMOTIDINE 20 MG/1
20 TABLET, FILM COATED ORAL 2 TIMES DAILY
Status: DISCONTINUED | OUTPATIENT
Start: 2020-09-15 | End: 2020-09-16 | Stop reason: HOSPADM

## 2020-09-15 RX ORDER — SODIUM CHLORIDE 0.9 % (FLUSH) 0.9 %
10 SYRINGE (ML) INJECTION
Status: DISCONTINUED | OUTPATIENT
Start: 2020-09-15 | End: 2020-09-16 | Stop reason: HOSPADM

## 2020-09-15 RX ORDER — KETOROLAC TROMETHAMINE 30 MG/ML
10 INJECTION, SOLUTION INTRAMUSCULAR; INTRAVENOUS
Status: COMPLETED | OUTPATIENT
Start: 2020-09-15 | End: 2020-09-15

## 2020-09-15 RX ORDER — MIDAZOLAM HYDROCHLORIDE 1 MG/ML
INJECTION, SOLUTION INTRAMUSCULAR; INTRAVENOUS
Status: DISCONTINUED | OUTPATIENT
Start: 2020-09-15 | End: 2020-09-15

## 2020-09-15 RX ORDER — OXYCODONE HYDROCHLORIDE 10 MG/1
10 TABLET ORAL EVERY 4 HOURS PRN
Status: DISCONTINUED | OUTPATIENT
Start: 2020-09-15 | End: 2020-09-16 | Stop reason: HOSPADM

## 2020-09-15 RX ORDER — OXYBUTYNIN CHLORIDE 5 MG/1
5 TABLET ORAL 3 TIMES DAILY PRN
Status: DISCONTINUED | OUTPATIENT
Start: 2020-09-15 | End: 2020-09-16 | Stop reason: HOSPADM

## 2020-09-15 RX ORDER — DIPHENOXYLATE HYDROCHLORIDE AND ATROPINE SULFATE 2.5; .025 MG/1; MG/1
1 TABLET ORAL 4 TIMES DAILY PRN
Status: DISCONTINUED | OUTPATIENT
Start: 2020-09-15 | End: 2020-09-16 | Stop reason: HOSPADM

## 2020-09-15 RX ORDER — DEXAMETHASONE SODIUM PHOSPHATE 4 MG/ML
INJECTION, SOLUTION INTRA-ARTICULAR; INTRALESIONAL; INTRAMUSCULAR; INTRAVENOUS; SOFT TISSUE
Status: DISCONTINUED | OUTPATIENT
Start: 2020-09-15 | End: 2020-09-15

## 2020-09-15 RX ORDER — ACETAMINOPHEN 325 MG/1
650 TABLET ORAL EVERY 6 HOURS PRN
Status: DISCONTINUED | OUTPATIENT
Start: 2020-09-15 | End: 2020-09-16 | Stop reason: HOSPADM

## 2020-09-15 RX ORDER — TAMSULOSIN HYDROCHLORIDE 0.4 MG/1
0.4 CAPSULE ORAL NIGHTLY
Status: DISCONTINUED | OUTPATIENT
Start: 2020-09-16 | End: 2020-09-16 | Stop reason: HOSPADM

## 2020-09-15 RX ORDER — OXYCODONE HYDROCHLORIDE 5 MG/1
5 TABLET ORAL EVERY 4 HOURS PRN
Status: DISCONTINUED | OUTPATIENT
Start: 2020-09-15 | End: 2020-09-16 | Stop reason: HOSPADM

## 2020-09-15 RX ORDER — MIRTAZAPINE 15 MG/1
30 TABLET, ORALLY DISINTEGRATING ORAL NIGHTLY
Status: DISCONTINUED | OUTPATIENT
Start: 2020-09-15 | End: 2020-09-16 | Stop reason: HOSPADM

## 2020-09-15 RX ORDER — VALACYCLOVIR HYDROCHLORIDE 500 MG/1
500 TABLET, FILM COATED ORAL DAILY
Status: DISCONTINUED | OUTPATIENT
Start: 2020-09-16 | End: 2020-09-16 | Stop reason: HOSPADM

## 2020-09-15 RX ORDER — SUCCINYLCHOLINE CHLORIDE 20 MG/ML
INJECTION INTRAMUSCULAR; INTRAVENOUS
Status: DISCONTINUED | OUTPATIENT
Start: 2020-09-15 | End: 2020-09-15

## 2020-09-15 RX ORDER — DIPHENHYDRAMINE HCL 25 MG
25 CAPSULE ORAL EVERY 12 HOURS PRN
Status: DISCONTINUED | OUTPATIENT
Start: 2020-09-15 | End: 2020-09-16 | Stop reason: HOSPADM

## 2020-09-15 RX ORDER — SODIUM CHLORIDE, SODIUM LACTATE, POTASSIUM CHLORIDE, CALCIUM CHLORIDE 600; 310; 30; 20 MG/100ML; MG/100ML; MG/100ML; MG/100ML
INJECTION, SOLUTION INTRAVENOUS CONTINUOUS
Status: DISCONTINUED | OUTPATIENT
Start: 2020-09-15 | End: 2020-09-16 | Stop reason: HOSPADM

## 2020-09-15 RX ORDER — PROPOFOL 10 MG/ML
VIAL (ML) INTRAVENOUS
Status: DISCONTINUED | OUTPATIENT
Start: 2020-09-15 | End: 2020-09-15

## 2020-09-15 RX ORDER — DIPHENHYDRAMINE HYDROCHLORIDE 50 MG/ML
INJECTION INTRAMUSCULAR; INTRAVENOUS
Status: DISCONTINUED | OUTPATIENT
Start: 2020-09-15 | End: 2020-09-15

## 2020-09-15 RX ORDER — LIDOCAINE HYDROCHLORIDE 20 MG/ML
INJECTION INTRAVENOUS
Status: DISCONTINUED | OUTPATIENT
Start: 2020-09-15 | End: 2020-09-15

## 2020-09-15 RX ORDER — LOPERAMIDE HYDROCHLORIDE 2 MG/1
2 CAPSULE ORAL DAILY PRN
Status: DISCONTINUED | OUTPATIENT
Start: 2020-09-15 | End: 2020-09-16 | Stop reason: HOSPADM

## 2020-09-15 RX ORDER — CEFTRIAXONE 1 G/1
1 INJECTION, POWDER, FOR SOLUTION INTRAMUSCULAR; INTRAVENOUS
Status: DISCONTINUED | OUTPATIENT
Start: 2020-09-15 | End: 2020-09-16 | Stop reason: HOSPADM

## 2020-09-15 RX ORDER — FENTANYL CITRATE 50 UG/ML
INJECTION, SOLUTION INTRAMUSCULAR; INTRAVENOUS
Status: DISCONTINUED | OUTPATIENT
Start: 2020-09-15 | End: 2020-09-15

## 2020-09-15 RX ORDER — HYDROMORPHONE HYDROCHLORIDE 1 MG/ML
1 INJECTION, SOLUTION INTRAMUSCULAR; INTRAVENOUS; SUBCUTANEOUS
Status: COMPLETED | OUTPATIENT
Start: 2020-09-15 | End: 2020-09-15

## 2020-09-15 RX ORDER — HYDROMORPHONE HYDROCHLORIDE 1 MG/ML
0.2 INJECTION, SOLUTION INTRAMUSCULAR; INTRAVENOUS; SUBCUTANEOUS EVERY 5 MIN PRN
Status: DISCONTINUED | OUTPATIENT
Start: 2020-09-15 | End: 2020-09-16 | Stop reason: HOSPADM

## 2020-09-15 RX ORDER — FAMOTIDINE 10 MG/ML
INJECTION INTRAVENOUS
Status: DISCONTINUED | OUTPATIENT
Start: 2020-09-15 | End: 2020-09-15

## 2020-09-15 RX ORDER — FENTANYL CITRATE 50 UG/ML
25 INJECTION, SOLUTION INTRAMUSCULAR; INTRAVENOUS EVERY 5 MIN PRN
Status: COMPLETED | OUTPATIENT
Start: 2020-09-15 | End: 2020-09-15

## 2020-09-15 RX ORDER — ZOLPIDEM TARTRATE 5 MG/1
10 TABLET ORAL NIGHTLY PRN
Status: DISCONTINUED | OUTPATIENT
Start: 2020-09-15 | End: 2020-09-16 | Stop reason: HOSPADM

## 2020-09-15 RX ORDER — DRONABINOL 5 MG/1
5 CAPSULE ORAL
Status: DISCONTINUED | OUTPATIENT
Start: 2020-09-16 | End: 2020-09-16 | Stop reason: HOSPADM

## 2020-09-15 RX ADMIN — CEFTRIAXONE SODIUM 1 G: 1 INJECTION, POWDER, FOR SOLUTION INTRAMUSCULAR; INTRAVENOUS at 06:09

## 2020-09-15 RX ADMIN — HYDROMORPHONE HYDROCHLORIDE 0.2 MG: 1 INJECTION, SOLUTION INTRAMUSCULAR; INTRAVENOUS; SUBCUTANEOUS at 08:09

## 2020-09-15 RX ADMIN — FENTANYL CITRATE 25 MCG: 50 INJECTION INTRAMUSCULAR; INTRAVENOUS at 08:09

## 2020-09-15 RX ADMIN — MIDAZOLAM HYDROCHLORIDE 2 MG: 1 INJECTION, SOLUTION INTRAMUSCULAR; INTRAVENOUS at 06:09

## 2020-09-15 RX ADMIN — DIPHENHYDRAMINE HYDROCHLORIDE 25 MG: 50 INJECTION INTRAMUSCULAR; INTRAVENOUS at 07:09

## 2020-09-15 RX ADMIN — PROPOFOL 200 MG: 10 INJECTION, EMULSION INTRAVENOUS at 07:09

## 2020-09-15 RX ADMIN — HYDROMORPHONE HYDROCHLORIDE 0.2 MG: 1 INJECTION, SOLUTION INTRAMUSCULAR; INTRAVENOUS; SUBCUTANEOUS at 09:09

## 2020-09-15 RX ADMIN — SODIUM CHLORIDE 1434 ML: 0.9 INJECTION, SOLUTION INTRAVENOUS at 03:09

## 2020-09-15 RX ADMIN — MIRTAZAPINE 30 MG: 15 TABLET, ORALLY DISINTEGRATING ORAL at 09:09

## 2020-09-15 RX ADMIN — SUCCINYLCHOLINE CHLORIDE 120 MG: 20 INJECTION, SOLUTION INTRAMUSCULAR; INTRAVENOUS at 07:09

## 2020-09-15 RX ADMIN — FENTANYL CITRATE 50 MCG: 50 INJECTION, SOLUTION INTRAMUSCULAR; INTRAVENOUS at 06:09

## 2020-09-15 RX ADMIN — KETOROLAC TROMETHAMINE 10 MG: 30 INJECTION, SOLUTION INTRAMUSCULAR at 03:09

## 2020-09-15 RX ADMIN — DEXAMETHASONE SODIUM PHOSPHATE 8 MG: 4 INJECTION, SOLUTION INTRAMUSCULAR; INTRAVENOUS at 07:09

## 2020-09-15 RX ADMIN — SODIUM CHLORIDE, SODIUM GLUCONATE, SODIUM ACETATE, POTASSIUM CHLORIDE, MAGNESIUM CHLORIDE, SODIUM PHOSPHATE, DIBASIC, AND POTASSIUM PHOSPHATE: .53; .5; .37; .037; .03; .012; .00082 INJECTION, SOLUTION INTRAVENOUS at 06:09

## 2020-09-15 RX ADMIN — FAMOTIDINE 20 MG: 10 INJECTION, SOLUTION INTRAVENOUS at 07:09

## 2020-09-15 RX ADMIN — OXYCODONE HYDROCHLORIDE 10 MG: 10 TABLET ORAL at 08:09

## 2020-09-15 RX ADMIN — HYDROMORPHONE HYDROCHLORIDE 0.2 MG: 1 INJECTION, SOLUTION INTRAMUSCULAR; INTRAVENOUS; SUBCUTANEOUS at 10:09

## 2020-09-15 RX ADMIN — FAMOTIDINE 20 MG: 20 TABLET ORAL at 08:09

## 2020-09-15 RX ADMIN — LIDOCAINE HYDROCHLORIDE 80 MG: 20 INJECTION, SOLUTION INTRAVENOUS at 07:09

## 2020-09-15 RX ADMIN — FENTANYL CITRATE 50 MCG: 50 INJECTION, SOLUTION INTRAMUSCULAR; INTRAVENOUS at 07:09

## 2020-09-15 RX ADMIN — HYDROMORPHONE HYDROCHLORIDE 1 MG: 1 INJECTION, SOLUTION INTRAMUSCULAR; INTRAVENOUS; SUBCUTANEOUS at 05:09

## 2020-09-15 RX ADMIN — IOHEXOL 75 ML: 350 INJECTION, SOLUTION INTRAVENOUS at 04:09

## 2020-09-15 NOTE — SUBJECTIVE & OBJECTIVE
Past Medical History:   Diagnosis Date    Abnormal Pap smear 2007    Abnormal Pap smear 5/26/2011    Anemia     Anxiety     Arthritis     C. difficile diarrhea     Crohn's disease     Depression 8/5/2017    Encounter for blood transfusion     Genital HSV     History of colposcopy with cervical biopsy 2007 and 7/2011 2007-LYLA I  and 7/2011- LYLA I    Hypertension     Kidney stone     Kidney stone     Melanoma     Recurrent UTI 4/3/2013    S/P ileostomy 7/9/2012    Sterilization 6/23/2012       Past Surgical History:   Procedure Laterality Date    ABDOMINAL SURGERY      APPENDECTOMY      BILATERAL SALPINGO-OOPHORECTOMY (BSO) Bilateral 5/30/2019    Procedure: SALPINGO-OOPHORECTOMY, BILATERAL;  Surgeon: Rupa German MD;  Location: [facility] OR [address] [place] [address];  Service: OB/GYN;  Laterality: Bilateral;    BLADDER SURGERY      partial cystectomy due to fistula    CKC      COLON SURGERY      COLONOSCOPY      DIAGNOSTIC LAPAROSCOPY N/A 7/9/2020    Procedure: LAPAROSCOPY, DIAGNOSTIC;  Surgeon: Gilson El MD;  Location: [facility] OR;  Service: OB/GYN;  Laterality: N/A;    EXCISION OF MELANOMA  07/17/2019    ILEOSTOMY      LAPAROSCOPIC LYSIS OF ADHESIONS N/A 7/9/2020    Procedure: LYSIS, ADHESIONS, LAPAROSCOPIC;  Surgeon: Gilson El MD;  Location: [facility] OR;  Service: OB/GYN;  Laterality: N/A;    LYSIS OF ADHESIONS N/A 5/30/2019    Procedure: LYSIS, ADHESIONS;  Surgeon: Rupa German MD;  Location: [facility] OR [address] [place] [address];  Service: OB/GYN;  Laterality: N/A;    OOPHORECTOMY Right 04/16/2015    PORTACATH PLACEMENT  02/21/2017    SKIN BIOPSY      SMALL INTESTINE SURGERY      age 18 Y    TOTAL ABDOMINAL HYSTERECTOMY  04/16/2015    TOTAL COLECTOMY      TUBAL LIGATION  06/06/2012    UPPER GASTROINTESTINAL ENDOSCOPY         Review of patient's allergies indicates:   Allergen Reactions    Azathioprine sodium Other (See Comments)     Other reaction(s): pancreatitis  Other reaction(s): pancreatitis     Methotrexate analogues Other (See Comments)     leukopenia    Stelara [ustekinumab] Other (See Comments)     Multiple infections    Zofran [ondansetron hcl (pf)] Other (See Comments)     Per patient causes prolong QT    Vancomycin analogues Hives    Morphine Itching and Other (See Comments)     Other reaction(s): Itching    Bactrim [sulfamethoxazole-trimethoprim] Palpitations    Ciprofloxacin Palpitations       Family History     Problem Relation (Age of Onset)    Cancer Father, Maternal Grandfather    Colon cancer Father    Crohn's disease Brother    Endometrial cancer Maternal Aunt    Hypertension Mother    Liver cancer Father    Skin cancer Maternal Grandfather          Tobacco Use    Smoking status: Never Smoker    Smokeless tobacco: Never Used   Substance and Sexual Activity    Alcohol use: Not Currently     Alcohol/week: 0.0 standard drinks    Drug use: No    Sexual activity: Yes     Partners: Male     Birth control/protection: Surgical     Comment: HYST       Review of Systems   Constitutional: Positive for fever. Negative for activity change, appetite change and fatigue.   HENT: Negative for facial swelling and hearing loss.    Eyes: Negative for discharge and itching.   Respiratory: Negative for chest tightness and shortness of breath.    Cardiovascular: Negative for chest pain and leg swelling.   Gastrointestinal: Negative for abdominal distention, abdominal pain, constipation, diarrhea, nausea and vomiting.   Genitourinary: Positive for difficulty urinating, dysuria, flank pain and urgency.   Musculoskeletal: Negative for arthralgias, back pain and neck pain.   Skin: Negative for color change and pallor.   Neurological: Negative for dizziness, facial asymmetry and light-headedness.   Hematological: Negative for adenopathy.   Psychiatric/Behavioral: Negative for agitation and decreased concentration. The patient is not nervous/anxious.        Objective:     Temp:  [97.8 °F (36.6 °C)-100.1 °F  (37.8 °C)] 100.1 °F (37.8 °C)  Pulse:  [] 100  Resp:  [12-20] 20  SpO2:  [96 %-100 %] 100 %  BP: (117-155)/(73-93) 130/86     Body mass index is 22.67 kg/m².           Drains     Drain                 Ileostomy 06/16/12 0000 RLQ 3013 days                Physical Exam   Constitutional: She is oriented to person, place, and time. She appears well-developed. No distress.   HENT:   Head: Normocephalic and atraumatic.   Eyes: No scleral icterus.   Neck: No tracheal deviation present.   Cardiovascular: Normal rate.    Pulmonary/Chest: Effort normal. No respiratory distress.   Abdominal: Soft. She exhibits no distension. There is abdominal tenderness.   Ileostomy present RLQ   Genitourinary:    Genitourinary Comments: Left CVA tenderness      Musculoskeletal: Normal range of motion.   Neurological: She is alert and oriented to person, place, and time.   Skin: Skin is warm and dry.     Psychiatric: Her behavior is normal.       Significant Labs:    BMP:  Recent Labs   Lab 09/15/20  1517      K 3.2*      CO2 19*   BUN 9   CREATININE 0.7   CALCIUM 8.6*       CBC:  Recent Labs   Lab 09/15/20  1517   WBC 4.16   HGB 13.4   HCT 41.5          All pertinent labs results from the past 24 hours have been reviewed.    Significant Imaging:  CT: I have reviewed all results within the past 24 hours and my personal findings are:  Distal left ureteral stone present 6mm

## 2020-09-15 NOTE — HPI
This is a 40 YOF with hx Crohn's disease s/p colectomy and proctectomy, colovesical fistula as a child, recurrent UTIs (hx of keflex ppx however stopped this when starting biologic), and kidney stones, presents to the ER with left flank pain and fever to 102.7 beginning yesterday. She has been unable to eat anything since yesterday due to nausea and pain. She had urinary urgency and dysuria she believed to be due to a UTI and was started on Keflex about 2 weeks ago. She finished her prescription about 3 days ago.      On arrival, her temp was 100.1. She had taken tylenol prior to arrival. On evaluation, she is tachycardic to 109 and normotensive.

## 2020-09-15 NOTE — ANESTHESIA PREPROCEDURE EVALUATION
[facility]  Anesthesia Pre-Operative Evaluation         Patient Name: Ivy Salgado  YOB: 1980  MRN: [id number]    SUBJECTIVE:     Pre-operative evaluation for Procedure(s) (LRB):  CYSTOSCOPY, WITH URETERAL STENT INSERTION (Left)     09/15/2020    Ivy Salgado is a 40 y.o. female w/ a significant PMHx of w/ a significant PMHx of HTN, Crohn's disease (s/p colectomy, proctectomy), nephrolithiasis, recurrent UTIs, s/p SHELLIE-BSO, who presents with a Left ureteral stone.    Patient now presents for the above procedure(s).    TTE 3/12/20  · Normal left ventricular systolic function. The estimated ejection fraction is 63%.  · No wall motion abnormalities.  · Normal LV diastolic function.  · Normal right ventricular systolic function.  · Normal central venous pressure (3 mmHg).    EKG 9/15/20  Sinus tachycardia   Otherwise normal ECG   When compared with ECG of 11-MAR-2020 10:24,   No significant change was found     LDA: None documented.    Prev airway: DL, Sarmiento 2, Grade I, ETT 7.0    Drips: None documented.    Patient Active Problem List   Diagnosis    Pelvic mass    S/P ileostomy    Crohn's disease of small intestine    Generalized anxiety disorder    Herpes genitalis in women    Ovarian cyst    Adnexal adhesions    Adhesions of uterus    Cyst of right ovary    Joint pain    Fatigue    Abdominal pain    Crohn's disease of small intestine with complication    Current mild episode of major depressive disorder without prior episode    Granulomatous colitis    Appetite impaired    Atrial tachycardia    Recurrent UTI    Vitamin D deficiency    Palpitations    Sinus tachycardia    Multiple thyroid nodules    Urinary tract infection with hematuria    Osteopenia of multiple sites    Diarrhea    Transient neurological symptoms    Chronic, continuous use of opioids    Acid reflux    SOBOE (shortness of breath on exertion)    Poor venous access    Alkaline  phosphatase elevation- based on lab from 4/9/2019     S/P ExLap, BSO, 5/30/19    Premature surgical menopause    Complex partial epilepsy with generalization and with intractable epilepsy    History of recurrent UTI (urinary tract infection)    Seizures    Pelvic pain in female    Left ureteral stone       Review of patient's allergies indicates:   Allergen Reactions    Azathioprine sodium Other (See Comments)     Other reaction(s): pancreatitis  Other reaction(s): pancreatitis    Methotrexate analogues Other (See Comments)     leukopenia    Stelara [ustekinumab] Other (See Comments)     Multiple infections    Zofran [ondansetron hcl (pf)] Other (See Comments)     Per patient causes prolong QT    Vancomycin analogues Hives    Morphine Itching and Other (See Comments)     Other reaction(s): Itching    Bactrim [sulfamethoxazole-trimethoprim] Palpitations    Ciprofloxacin Palpitations       Current Outpatient Medications:  No current facility-administered medications for this encounter.     Current Outpatient Medications:     cephALEXin (KEFLEX) 250 MG capsule, TAKE 1 CAPSULE(250 MG) BY MOUTH EVERY DAY, Disp: 30 capsule, Rfl: 11    clonazePAM (KLONOPIN) 1 MG tablet, TAKE 1 AND 1/2 TABLETS BY MOUTH TWICE DAILY AS NEEDED FOR ANXIETY, Disp: 90 tablet, Rfl: 0    diphenoxylate-atropine 2.5-0.025 mg (LOMOTIL) 2.5-0.025 mg per tablet, Take 1 tablet by mouth 4 (four) times daily as needed. for diarrhea, Disp: 100 tablet, Rfl: 0    diphenoxylate-atropine 2.5-0.025 mg (LOMOTIL) 2.5-0.025 mg per tablet, Take 1 tablet by mouth 4 (four) times daily as needed. for diarrhea, Disp: 100 tablet, Rfl: 1    dronabinoL (MARINOL) 5 MG capsule, Take 1 capsule (5 mg total) by mouth 2 (two) times daily before meals., Disp: 60 capsule, Rfl: 0    famotidine (PEPCID) 20 MG tablet, Take 20 mg by mouth 2 (two) times daily., Disp: , Rfl:     fluconazole (DIFLUCAN) 100 MG tablet, TAKE 1 TABLET BY MOUTH ONCE DAILY, Disp: 7  tablet, Rfl: 0    fluconazole (DIFLUCAN) 150 MG Tab, Take 1 tablet (150 mg total) by mouth every 72 hours as needed., Disp: 3 tablet, Rfl: 0    food supplemt, lactose-reduced (BOOST BREEZE NUTRITIONAL) 0.04-1.05 gram-kcal/mL Liqd, Use 3 times per day, Disp: 6399 mL, Rfl: 5    gabapentin (NEURONTIN) 300 MG capsule, Take 1 capsule (300 mg total) by mouth once daily. After two weeks, take one twice daily, Disp: 60 capsule, Rfl: 11    loperamide (IMODIUM) 2 mg capsule, Take 2 mg by mouth daily as needed for Diarrhea., Disp: , Rfl:     mirtazapine (REMERON SOL-TAB) 30 MG disintegrating tablet, Take 1 tablet (30 mg total) by mouth nightly., Disp: 90 tablet, Rfl: 3    multivitamin (ONE DAILY MULTIVITAMIN) per tablet, Take 1 tablet by mouth once daily., Disp: , Rfl:     oxyCODONE-acetaminophen (PERCOCET) 5-325 mg per tablet, Take 1 tablet by mouth every 4 (four) hours as needed for Pain., Disp: 20 tablet, Rfl: 0    oxyCODONE-acetaminophen (PERCOCET) 7.5-325 mg per tablet, Take 1 tablet by mouth every 4 (four) hours as needed for Pain., Disp: 10 tablet, Rfl: 0    potassium citrate (UROCIT-K 15) 15 mEq TbSR, Take 2 tablets by mouth 2 (two) times daily., Disp: 360 tablet, Rfl: 3    promethazine (PHENERGAN) 25 MG tablet, TAKE 1 TABLET(25 MG) BY MOUTH EVERY 6 HOURS AS NEEDED, Disp: 30 tablet, Rfl: 0    sumatriptan (IMITREX) 50 MG tablet, Take 1 tab po prn migraine; may repeat once in 2 hours prn; do not exceed 2 tabs in 24 hours (Patient not taking: Reported on 9/14/2020), Disp: 12 tablet, Rfl: 0    traMADoL (ULTRAM) 50 mg tablet, Take 1 tablet (50 mg total) by mouth every 6 (six) hours as needed for Pain., Disp: 10 tablet, Rfl: 0    valACYclovir (VALTREX) 500 MG tablet, TAKE 1 TABLET BY MOUTH EVERY DAY, Disp: 90 tablet, Rfl: 2    vedolizumab (ENTYVIO) 300 mg SolR injection, Inject 300 mg into the vein., Disp: , Rfl:     zolpidem (AMBIEN) 10 mg Tab, TAKE 1 TABLET BY MOUTH EVERY NIGHT AT BEDTIME, Disp: 30 tablet,  Rfl: 1    Past Surgical History:   Procedure Laterality Date    ABDOMINAL SURGERY      APPENDECTOMY      BILATERAL SALPINGO-OOPHORECTOMY (BSO) Bilateral 5/30/2019    Procedure: SALPINGO-OOPHORECTOMY, BILATERAL;  Surgeon: Rupa German MD;  Location: [facility] OR [address] [place] [address];  Service: OB/GYN;  Laterality: Bilateral;    BLADDER SURGERY      partial cystectomy due to fistula    CKC      COLON SURGERY      COLONOSCOPY      DIAGNOSTIC LAPAROSCOPY N/A 7/9/2020    Procedure: LAPAROSCOPY, DIAGNOSTIC;  Surgeon: Gilson El MD;  Location: [facility];  Service: OB/GYN;  Laterality: N/A;    EXCISION OF MELANOMA  07/17/2019    ILEOSTOMY      LAPAROSCOPIC LYSIS OF ADHESIONS N/A 7/9/2020    Procedure: LYSIS, ADHESIONS, LAPAROSCOPIC;  Surgeon: Gilson El MD;  Location: [facility];  Service: OB/GYN;  Laterality: N/A;    LYSIS OF ADHESIONS N/A 5/30/2019    Procedure: LYSIS, ADHESIONS;  Surgeon: Rupa German MD;  Location: [address];  Service: OB/GYN;  Laterality: N/A;    OOPHORECTOMY Right 04/16/2015    PORTACATH PLACEMENT  02/21/2017    SKIN BIOPSY      SMALL INTESTINE SURGERY      age 18 Y    TOTAL ABDOMINAL HYSTERECTOMY  04/16/2015    TOTAL COLECTOMY      TUBAL LIGATION  06/06/2012    UPPER GASTROINTESTINAL ENDOSCOPY         Social History     Socioeconomic History    Marital status: Single     Spouse name: Not on file    Number of children: Not on file    Years of education: Not on file    Highest education level: Not on file   Occupational History     Employer: [employer]   Social Needs    Financial resource strain: Not on file    Food insecurity     Worry: Not on file     Inability: Not on file    Transportation needs     Medical: Not on file     Non-medical: Not on file   Tobacco Use    Smoking status: Never Smoker    Smokeless tobacco: Never Used   Substance and Sexual Activity    Alcohol use: Not Currently     Alcohol/week: 0.0 standard drinks    Drug use: No     Sexual activity: Yes     Partners: Male     Birth control/protection: Surgical     Comment: HYST   Lifestyle    Physical activity     Days per week: Not on file     Minutes per session: Not on file    Stress: Not on file   Relationships    Social connections     Talks on phone: Not on file     Gets together: Not on file     Attends Anabaptism service: Not on file     Active member of club or organization: Not on file     Attends meetings of clubs or organizations: Not on file     Relationship status: Not on file   Other Topics Concern    Are you pregnant or think you may be? No    Breast-feeding No   Social History Narrative    Not on file       OBJECTIVE:     Vital Signs Range (Last 24H):  Temp:  [36.6 °C (97.8 °F)-37.8 °C (100.1 °F)]   Pulse:  []   Resp:  [12-20]   BP: (117-155)/(73-93)   SpO2:  [96 %-100 %]       Significant Labs:  Lab Results   Component Value Date    WBC 4.16 09/15/2020    HGB 13.4 09/15/2020    HCT 41.5 09/15/2020     09/15/2020    CHOL 163 01/28/2020    TRIG 132 01/28/2020    HDL 40 01/28/2020    ALT 24 09/15/2020    AST 27 09/15/2020     09/15/2020    K 3.2 (L) 09/15/2020     09/15/2020    CREATININE 0.7 09/15/2020    BUN 9 09/15/2020    CO2 19 (L) 09/15/2020    TSH 0.628 01/28/2020    INR 1.1 02/02/2018    HGBA1C 4.8 09/18/2018       Diagnostic Studies: No relevant studies.    EKG:   Results for orders placed or performed during the hospital encounter of 09/15/20   EKG 12-lead    Collection Time: 09/15/20  3:19 PM    Narrative    Test Reason : R10.9,    Vent. Rate : 109 BPM     Atrial Rate : 109 BPM     P-R Int : 118 ms          QRS Dur : 068 ms      QT Int : 322 ms       P-R-T Axes : 054 018 040 degrees     QTc Int : 433 ms    Sinus tachycardia  Otherwise normal ECG  When compared with ECG of 11-MAR-2020 10:24,  No significant change was found  Confirmed by Adirana Monsalve MD ([id number]) on 9/15/2020 3:24:56 PM    Referred By: [email or web address]   SELF           Confirmed  By:Adriana Monsalve MD       2D ECHO:  TTE:  Results for orders placed or performed during the hospital encounter of 03/11/20   Echo Color Flow Doppler? Yes; Bubble Contrast? Yes   Result Value Ref Range    Ascending aorta 3.07 cm    STJ 2.66 cm    AV mean gradient 4 mmHg    Ao peak richy 1.22 m/s    Ao VTI 23.73 cm    IVS 0.62 0.6 - 1.1 cm    LA size 2.90 cm    Left Atrium Major Axis 3.56 cm    Left Atrium Minor Axis 4.07 cm    LVIDD 3.96 3.5 - 6.0 cm    LVIDS 2.35 2.1 - 4.0 cm    LVOT diameter 1.99 cm    LVOT peak VTI 23.94 cm    PW 0.57 (A) 0.6 - 1.1 cm    MV Peak A Richy 0.61 m/s    E wave decelartion time 140.52 msec    MV Peak E Richy 0.98 m/s    PV Peak D Richy 0.55 m/s    PV Peak S Richy 0.78 m/s    RA Major Axis 3.04 cm    RA Width 2.50 cm    RVDD 2.05 cm    Sinus 2.92 cm    TAPSE 2.13 cm    TDI LATERAL 0.16 m/s    TDI SEPTAL 0.10 m/s    LA WIDTH 2.82 cm    LV Diastolic Volume 68.26 mL    LV Systolic Volume 19.22 mL    LVOT peak richy 1.35 m/s    LV LATERAL E/E' RATIO 6.13 m/s    LV SEPTAL E/E' RATIO 9.80 m/s    FS 41 %    LA volume 26.40 cm3    LV mass 62.58 g    Left Ventricle Relative Wall Thickness 0.29 cm    AV valve area 3.14 cm2    AV Velocity Ratio 1.11     AV index (prosthetic) 1.01     E/A ratio 1.61     Mean e' 0.13 m/s    Pulm vein S/D ratio 1.42     LVOT area 3.1 cm2    LVOT stroke volume 74.42 cm3    AV peak gradient 6 mmHg    E/E' ratio 7.54 m/s    LV Systolic Volume Index 12.8 mL/m2    LV Diastolic Volume Index 45.38 mL/m2    LA Volume Index 17.6 mL/m2    LV Mass Index 42 g/m2    BSA 1.51 m2    Right Atrial Pressure (from IVC) 3 mmHg    Narrative    · Normal left ventricular systolic function. The estimated ejection   fraction is 63%.  · No wall motion abnormalities.  · Normal LV diastolic function.  · Normal right ventricular systolic function.  · Normal central venous pressure (3 mmHg).          MARCIE:  No results found. However, due to the size of the patient record, not all encounters were searched.  Please check Results Review for a complete set of results.    ASSESSMENT/PLAN:           Anesthesia Evaluation    I have reviewed the Patient Summary Reports.    I have reviewed the Nursing Notes. I have reviewed the NPO Status.   I have reviewed the Medications.     Review of Systems  Anesthesia Hx:  History of prior surgery of interest to airway management or planning: Denies Family Hx of Anesthesia complications.   Denies Personal Hx of Anesthesia complications.   Hematology/Oncology:         -- Cancer in past history: Other (see Oncology comments) Oncology Comments: melanoma     Cardiovascular:   Hypertension    Pulmonary:   Shortness of breath    Renal/:   Chronic Renal Disease renal calculi    Hepatic/GI:   GERD Crohn's - ileostomy   Neurological:   Seizures    Psych:   Psychiatric History anxiety depression          Physical Exam  General:  Well nourished    Airway/Jaw/Neck:  Airway Findings: Mouth Opening: Normal Tongue: Normal  General Airway Assessment: Adult  Mallampati: II  Improves to I with phonation.      Dental:  Dental Findings: In tact   Chest/Lungs:  Chest/Lungs Findings: Clear to auscultation, Normal Respiratory Rate     Heart/Vascular:  Heart Findings: Rate: Normal  Rhythm: Regular Rhythm  Sounds: Normal        Mental Status:  Mental Status Findings:  Cooperative, Alert and Oriented         Anesthesia Plan  Type of Anesthesia, risks & benefits discussed:  Anesthesia Type:  general, MAC  Patient's Preference:   Intra-op Monitoring Plan: standard ASA monitors  Intra-op Monitoring Plan Comments:   Post Op Pain Control Plan: multimodal analgesia, IV/PO Opioids PRN and per primary service following discharge from PACU  Post Op Pain Control Plan Comments:   Induction:   IV  Beta Blocker:  Patient is not currently on a Beta-Blocker (No further documentation required).       Informed Consent: Patient understands risks and agrees with Anesthesia plan.  Questions answered. Anesthesia consent signed with  patient.  ASA Score: 2  emergent   Day of Surgery Review of History & Physical: I have interviewed and examined the patient. I have reviewed the patient's H&P dated:  There are no significant changes.  H&P update referred to the surgeon.         Ready For Surgery From Anesthesia Perspective.

## 2020-09-15 NOTE — ED NOTES
Pt transported to OR by MD. NAD. Pt wearing mask. Pt's laptop computer, cell phone, and clothing bag on stretcher with pt.

## 2020-09-15 NOTE — CONSULTS
[facility]  Urology  Consult Note    Patient Name: Ivy Salgado  MRN: [id number]  Admission Date: 9/15/2020  Hospital Length of Stay: 0   Code Status: Prior   Attending Provider: Brad Wong MD   Consulting Provider: Nga Vegas MD  Primary Care Physician: Kalia Astorga MD  Principal Problem:<principal problem not specified>    Inpatient consult to Urology  Consult performed by: Nga Vegas MD  Consult ordered by: Thelma Moon PA-C          Subjective:     HPI:  This is a 40 YOF with hx Crohn's disease s/p colectomy and proctectomy, colovesical fistula as a child, recurrent UTIs (hx of keflex ppx however stopped this when starting biologic), and kidney stones, presents to the ER with left flank pain and fever to 102.7 beginning yesterday. She has been unable to eat anything since yesterday due to nausea and pain. She had urinary urgency and dysuria she believed to be due to a UTI and was started on Keflex about 2 weeks ago. She finished her prescription about 3 days ago.      On arrival, her temp was 100.1. She had taken tylenol prior to arrival. On evaluation, she is tachycardic to 109 and normotensive.        Past Medical History:   Diagnosis Date    Abnormal Pap smear 2007    Abnormal Pap smear 5/26/2011    Anemia     Anxiety     Arthritis     C. difficile diarrhea     Crohn's disease     Depression 8/5/2017    Encounter for blood transfusion     Genital HSV     History of colposcopy with cervical biopsy 2007 and 7/2011 2007-LYLA I  and 7/2011- LYLA I    Hypertension     Kidney stone     Kidney stone     Melanoma     Recurrent UTI 4/3/2013    S/P ileostomy 7/9/2012    Sterilization 6/23/2012       Past Surgical History:   Procedure Laterality Date    ABDOMINAL SURGERY      APPENDECTOMY      BILATERAL SALPINGO-OOPHORECTOMY (BSO) Bilateral 5/30/2019    Procedure: SALPINGO-OOPHORECTOMY, BILATERAL;  Surgeon: Rupa German MD;   Location: [facility] OR 2ND FLR;  Service: OB/GYN;  Laterality: Bilateral;    BLADDER SURGERY      partial cystectomy due to fistula    CKC      COLON SURGERY      COLONOSCOPY      DIAGNOSTIC LAPAROSCOPY N/A 7/9/2020    Procedure: LAPAROSCOPY, DIAGNOSTIC;  Surgeon: Gilson El MD;  Location: [facility] OR;  Service: OB/GYN;  Laterality: N/A;    EXCISION OF MELANOMA  07/17/2019    ILEOSTOMY      LAPAROSCOPIC LYSIS OF ADHESIONS N/A 7/9/2020    Procedure: LYSIS, ADHESIONS, LAPAROSCOPIC;  Surgeon: Gilson El MD;  Location: [facility] OR;  Service: OB/GYN;  Laterality: N/A;    LYSIS OF ADHESIONS N/A 5/30/2019    Procedure: LYSIS, ADHESIONS;  Surgeon: Rupa German MD;  Location: [facility] OR 2ND FLR;  Service: OB/GYN;  Laterality: N/A;    OOPHORECTOMY Right 04/16/2015    PORTACATH PLACEMENT  02/21/2017    SKIN BIOPSY      SMALL INTESTINE SURGERY      age 18 Y    TOTAL ABDOMINAL HYSTERECTOMY  04/16/2015    TOTAL COLECTOMY      TUBAL LIGATION  06/06/2012    UPPER GASTROINTESTINAL ENDOSCOPY         Review of patient's allergies indicates:   Allergen Reactions    Azathioprine sodium Other (See Comments)     Other reaction(s): pancreatitis  Other reaction(s): pancreatitis    Methotrexate analogues Other (See Comments)     leukopenia    Stelara [ustekinumab] Other (See Comments)     Multiple infections    Zofran [ondansetron hcl (pf)] Other (See Comments)     Per patient causes prolong QT    Vancomycin analogues Hives    Morphine Itching and Other (See Comments)     Other reaction(s): Itching    Bactrim [sulfamethoxazole-trimethoprim] Palpitations    Ciprofloxacin Palpitations       Family History     Problem Relation (Age of Onset)    Cancer Father, Maternal Grandfather    Colon cancer Father    Crohn's disease Brother    Endometrial cancer Maternal Aunt    Hypertension Mother    Liver cancer Father    Skin cancer Maternal Grandfather          Tobacco Use    Smoking status: Never Smoker    Smokeless  tobacco: Never Used   Substance and Sexual Activity    Alcohol use: Not Currently     Alcohol/week: 0.0 standard drinks    Drug use: No    Sexual activity: Yes     Partners: Male     Birth control/protection: Surgical     Comment: HYST       Review of Systems   Constitutional: Positive for fever. Negative for activity change, appetite change and fatigue.   HENT: Negative for facial swelling and hearing loss.    Eyes: Negative for discharge and itching.   Respiratory: Negative for chest tightness and shortness of breath.    Cardiovascular: Negative for chest pain and leg swelling.   Gastrointestinal: Negative for abdominal distention, abdominal pain, constipation, diarrhea, nausea and vomiting.   Genitourinary: Positive for difficulty urinating, dysuria, flank pain and urgency.   Musculoskeletal: Negative for arthralgias, back pain and neck pain.   Skin: Negative for color change and pallor.   Neurological: Negative for dizziness, facial asymmetry and light-headedness.   Hematological: Negative for adenopathy.   Psychiatric/Behavioral: Negative for agitation and decreased concentration. The patient is not nervous/anxious.        Objective:     Temp:  [97.8 °F (36.6 °C)-100.1 °F (37.8 °C)] 100.1 °F (37.8 °C)  Pulse:  [] 100  Resp:  [12-20] 20  SpO2:  [96 %-100 %] 100 %  BP: (117-155)/(73-93) 130/86     Body mass index is 22.67 kg/m².           Drains     Drain                 Ileostomy 06/16/12 0000 RLQ 3013 days                Physical Exam   Constitutional: She is oriented to person, place, and time. She appears well-developed. No distress.   HENT:   Head: Normocephalic and atraumatic.   Eyes: No scleral icterus.   Neck: No tracheal deviation present.   Cardiovascular: Normal rate.    Pulmonary/Chest: Effort normal. No respiratory distress.   Abdominal: Soft. She exhibits no distension. There is abdominal tenderness.   Ileostomy present RLQ   Genitourinary:    Genitourinary Comments: Left CVA tenderness       Musculoskeletal: Normal range of motion.   Neurological: She is alert and oriented to person, place, and time.   Skin: Skin is warm and dry.     Psychiatric: Her behavior is normal.       Significant Labs:    BMP:  Recent Labs   Lab 09/15/20  1517      K 3.2*      CO2 19*   BUN 9   CREATININE 0.7   CALCIUM 8.6*       CBC:  Recent Labs   Lab 09/15/20  1517   WBC 4.16   HGB 13.4   HCT 41.5          All pertinent labs results from the past 24 hours have been reviewed.    Significant Imaging:  CT: I have reviewed all results within the past 24 hours and my personal findings are:  Distal left ureteral stone present 6mm                    Assessment and Plan:     Left ureteral stone  To OR for class A ureteral stent placement   Keep NPO        VTE Risk Mitigation (From admission, onward)    None          Thank you for your consult. I will follow-up with patient. Please contact us if you have any additional questions.    Nga Vegas MD  Urology  [facility]

## 2020-09-15 NOTE — PATIENT INSTRUCTIONS
Blood in the Urine    Blood in the urine (hematuria) has many possible causes. If it occurs after an injury (such as a car accident or fall), it is most often a sign of bruising to the kidney or bladder. Common causes of blood in the urine include urinary tract infections, kidney stones, inflammation, tumors, or certain other diseases of the kidney or bladder. Menstruation can cause blood to appear in the urine sample, although it is not coming from the urinary tract.  If only a trace amount of blood is present, it will show up on the urine test, even though the urine may be yellow and not pink or red. This may occur with any of the above conditions, as well as heavy exercise or high fever. In this case, your doctor may want to repeat the urine test on another day. This will show if the blood is still present. If it is, then other tests can be done to find out the cause.  Home care  Follow these home care guidelines:  · If your urine does not appear bloody (pink, brown or red) then you do not need to restrict your activity in any way.  · If you can see blood in your urine, rest and avoid heavy exertion until your next exam. Do not use aspirin, blood thinners, or anti-platelet or anti-inflammatory medicines. These include ibuprofen and naproxen. These thin the blood and may increase bleeding.  Follow-up care  Follow up with your healthcare provider, or as advised. If you were injured and had blood in your urine, you should have a repeat urine test in 1 to 2 days. Contact your doctor for this test.  A radiologist will review any X-rays that were taken. You will be told of any new findings that may affect your care.  When to seek medical advice  Call your healthcare provider right away if any of these occur:  · Bright red blood or blood clots in the urine (if you did not have this before)  · Weakness, dizziness or fainting  · New groin, abdominal, or back pain  · Fever of 100.4ºF (38ºC) or higher, or as directed by  your healthcare provider  · Repeated vomiting  · Bleeding from the nose or gums or easy bruising  · Pt stated that she is going to ED to check out for kidney stone.  Date Last Reviewed: 9/1/2016  © 4264-8807 [employer]. [address] [place] [address], [place] [address]. All rights reserved. This information is not intended as a substitute for professional medical care. Always follow your healthcare professional's instructions.

## 2020-09-15 NOTE — ED PROVIDER NOTES
Encounter Date: 9/15/2020       History     Chief Complaint   Patient presents with    Flank Pain     right flank pain, fever, hematuria - patient had a UTI two weeks ago and saw her urologist yesterday who also said it was a UTI.  Temp was 102.7 at Urgent Care     40-year-old female with past medical history of crohn's disease, HTN, nephrolithiasis, recurrent UTIs who presents to the ED with complaints of left flank pain, fever (T max 102.7), urinary urgency and dysuria. Diagnosed with UTI two weeks ago. Started on Keflex x 7 days. Last dose about 3 days ago. She continued with urgency therefore followed up with Urology yesterday. Plan to await culture before starting new antibiotic regimen. She reports flank pain is relatively constant however waxes and wanes in intensity. Endorses nausea but denies vomiting, CP, SOB, diarrhea, lower extremity edema. Does endorse chronic right lower quadrant abdominal pain x 2 years but denies any new symptoms. Denies other worsening or alleviating factors. Last dose of tylenol about 1:30 pm. This is the extent of the patient's complaints.         Review of patient's allergies indicates:   Allergen Reactions    Azathioprine sodium Other (See Comments)     Other reaction(s): pancreatitis  Other reaction(s): pancreatitis    Methotrexate analogues Other (See Comments)     leukopenia    Stelara [ustekinumab] Other (See Comments)     Multiple infections    Zofran [ondansetron hcl (pf)] Other (See Comments)     Per patient causes prolong QT    Vancomycin analogues Hives    Morphine Itching and Other (See Comments)     Other reaction(s): Itching    Bactrim [sulfamethoxazole-trimethoprim] Palpitations    Ciprofloxacin Palpitations     Past Medical History:   Diagnosis Date    Abnormal Pap smear 2007    Abnormal Pap smear 5/26/2011    Anemia     Anxiety     Arthritis     C. difficile diarrhea     Crohn's disease     Depression 8/5/2017    Encounter for blood transfusion      Genital HSV     History of colposcopy with cervical biopsy 2007 and 7/2011 2007-LYLA I  and 7/2011- LYLA I    Hypertension     Kidney stone     Kidney stone     Melanoma     Recurrent UTI 4/3/2013    S/P ileostomy 7/9/2012    Sterilization 6/23/2012     Past Surgical History:   Procedure Laterality Date    ABDOMINAL SURGERY      APPENDECTOMY      BILATERAL SALPINGO-OOPHORECTOMY (BSO) Bilateral 5/30/2019    Procedure: SALPINGO-OOPHORECTOMY, BILATERAL;  Surgeon: Rupa German MD;  Location: [facility] OR [address] [place] [address];  Service: OB/GYN;  Laterality: Bilateral;    BLADDER SURGERY      partial cystectomy due to fistula    [facility]      COLON SURGERY      COLONOSCOPY      DIAGNOSTIC LAPAROSCOPY N/A 7/9/2020    Procedure: LAPAROSCOPY, DIAGNOSTIC;  Surgeon: Gilson El MD;  Location: [facility] OR;  Service: OB/GYN;  Laterality: N/A;    EXCISION OF MELANOMA  07/17/2019    ILEOSTOMY      LAPAROSCOPIC LYSIS OF ADHESIONS N/A 7/9/2020    Procedure: LYSIS, ADHESIONS, LAPAROSCOPIC;  Surgeon: Gilson El MD;  Location: [facility] OR;  Service: OB/GYN;  Laterality: N/A;    LYSIS OF ADHESIONS N/A 5/30/2019    Procedure: LYSIS, ADHESIONS;  Surgeon: Rupa German MD;  Location: [facility] OR [address] [place] [address];  Service: OB/GYN;  Laterality: N/A;    OOPHORECTOMY Right 04/16/2015    PORTACATH PLACEMENT  02/21/2017    SKIN BIOPSY      SMALL INTESTINE SURGERY      age 18 Y    TOTAL ABDOMINAL HYSTERECTOMY  04/16/2015    TOTAL COLECTOMY      TUBAL LIGATION  06/06/2012    UPPER GASTROINTESTINAL ENDOSCOPY       Family History   Problem Relation Age of Onset    Colon cancer Father     Cancer Father         colon cancer    Liver cancer Father     Hypertension Mother     Cancer Maternal Grandfather         esophageal      Skin cancer Maternal Grandfather     Endometrial cancer Maternal Aunt     Crohn's disease Brother     Breast cancer Neg Hx     Ovarian cancer Neg Hx     Melanoma Neg Hx      Social History     Tobacco Use     Smoking status: Never Smoker    Smokeless tobacco: Never Used   Substance Use Topics    Alcohol use: Not Currently     Alcohol/week: 0.0 standard drinks    Drug use: No     Review of Systems   Constitutional: Negative for fever.   HENT: Negative for congestion and sore throat.    Eyes: Negative for visual disturbance.   Respiratory: Negative for shortness of breath.    Cardiovascular: Negative for chest pain.   Gastrointestinal: Positive for abdominal pain and nausea. Negative for diarrhea and vomiting.   Genitourinary: Positive for dysuria, flank pain and urgency.   Musculoskeletal: Positive for back pain.   Skin: Negative for rash.   Neurological: Negative for weakness and headaches.   Hematological: Does not bruise/bleed easily.       Physical Exam     Initial Vitals [09/15/20 1435]   BP Pulse Resp Temp SpO2   (!) 146/80 (!) 135 16 100.1 °F (37.8 °C) 97 %      MAP       --         Physical Exam    Nursing note and vitals reviewed.  Constitutional: She appears well-developed and well-nourished. She is not diaphoretic. No distress.   HENT:   Head: Normocephalic and atraumatic.   Mouth/Throat: Oropharynx is clear and moist.   Eyes: Conjunctivae and EOM are normal. Pupils are equal, round, and reactive to light.   Neck: Normal range of motion. Neck supple.   Cardiovascular: Regular rhythm, normal heart sounds and intact distal pulses. Tachycardia present.  Exam reveals no gallop and no friction rub.    No murmur heard.  Pulmonary/Chest: Breath sounds normal. She has no wheezes. She has no rhonchi. She has no rales.   Abdominal: Soft. Bowel sounds are normal. There is no abdominal tenderness. There is CVA tenderness (left). There is no rebound and no guarding.   Musculoskeletal: Normal range of motion.   Neurological: She is alert and oriented to person, place, and time. She has normal strength. No cranial nerve deficit or sensory deficit. GCS score is 15. GCS eye subscore is 4. GCS verbal subscore is 5. GCS motor  subscore is 6.   Skin: Skin is warm and dry. Capillary refill takes less than 2 seconds.   Psychiatric: She has a normal mood and affect. Her behavior is normal. Judgment and thought content normal.         ED Course   Procedures  Labs Reviewed   CBC W/ AUTO DIFFERENTIAL - Abnormal; Notable for the following components:       Result Value    Mono% 19.5 (*)     All other components within normal limits   COMPREHENSIVE METABOLIC PANEL - Abnormal; Notable for the following components:    Potassium 3.2 (*)     CO2 19 (*)     Calcium 8.6 (*)     All other components within normal limits   URINALYSIS, REFLEX TO URINE CULTURE - Abnormal; Notable for the following components:    Appearance, UA Hazy (*)     Ketones, UA 3+ (*)     Occult Blood UA 2+ (*)     All other components within normal limits    Narrative:     Specimen Source->Urine   SEDIMENTATION RATE - Abnormal; Notable for the following components:    Sed Rate 51 (*)     All other components within normal limits   C-REACTIVE PROTEIN - Abnormal; Notable for the following components:    CRP 80.3 (*)     All other components within normal limits   URINALYSIS MICROSCOPIC - Abnormal; Notable for the following components:    RBC, UA 32 (*)     All other components within normal limits    Narrative:     Specimen Source->Urine   CULTURE, BLOOD   CULTURE, BLOOD   CULTURE, URINE   LACTIC ACID, PLASMA   LACTIC ACID, PLASMA     EKG Readings: (Independently Interpreted)   Initial Reading: No STEMI. Rhythm: Sinus Tachycardia. Heart Rate: 109.     ECG Results          EKG 12-lead (Final result)  Result time 09/15/20 15:25:13    Final result by Interface, Lab In [facility] (09/15/20 15:25:13)                 Narrative:    Test Reason : R10.9,    Vent. Rate : 109 BPM     Atrial Rate : 109 BPM     P-R Int : 118 ms          QRS Dur : 068 ms      QT Int : 322 ms       P-R-T Axes : 054 018 040 degrees     QTc Int : 433 ms    Sinus tachycardia  Otherwise normal ECG  When compared with ECG  of 11-MAR-2020 10:24,  No significant change was found  Confirmed by Gricel HENDERSON, Adriana ([id number]) on 9/15/2020 3:24:56 PM    Referred By: [email or web address]   SELF           Confirmed By:Adriana Monsalve MD                            Imaging Results           CT Abdomen Pelvis With Contrast (Final result)  Result time 09/15/20 17:09:33    Final result by Kelby Solano MD (09/15/20 17:09:33)                 Impression:      This report was flagged in Epic as abnormal.    1. Mild left hydroureteronephrosis secondary to a 6 mm calculus within the distal left ureter.  There is associated left ureteral urothelial thickening and enhancement.  Correlation with urinalysis is recommended to exclude superimposed infection.  2. Additional bilateral nonobstructive nephrolithiasis.  3. Hepatomegaly, correlation with LFTs recommended.  4. Surgical changes of colectomy without complication.  There is strand-like fluid in the deep pelvis related to previous collection, no focal organized collection in the region at this time.  5. Cystic structure within the right adnexa suggests right ovarian cyst however correlation with prior surgical history is recommended in this patient status post hysterectomy.  Ultrasound could be performed for further evaluation as warranted.  Given that this patient has had previous pelvic fluid collection, developing collection remains a differential consideration.  6. Please see above for several additional findings.      Electronically signed by: Kelby Solano MD  Date:    09/15/2020  Time:    17:09             Narrative:    EXAMINATION:  CT ABDOMEN PELVIS WITH CONTRAST    CLINICAL HISTORY:  Abdominal infection suspected;    TECHNIQUE:  Low dose axial images, sagittal and coronal reformations were obtained from the lung bases to the pubic symphysis following the IV administration of 75 mL of Omnipaque 350 .  Oral contrast was not given.    COMPARISON:  MRI 05/27/2020, CT 06/01/2019    FINDINGS:  Images of the  lower thorax are remarkable for minimal dependent atelectasis.    The liver is prominent, correlation with LFTs recommended.  The spleen, pancreas, gallbladder and adrenal glands are grossly unremarkable.  There is no biliary dilation or abdominal ascites.  The pancreatic duct is not dilated.  The portal vein, splenic vein, SMV, celiac axis and SMA all are patent.  Several scattered shotty periaortic and paracaval lymph nodes are noted.    The kidneys enhance symmetrically.  There is bilateral nonobstructive nephrolithiasis versus early contrast excretion.  There is mild prominence of the bilateral renal collecting systems.  There are a few scattered subcentimeter low attenuating lesions within the kidneys bilaterally, too small for characterization.  There is diffuse urothelial thickening and enhancement involving the left ureteral urothelium.  The left ureter is unable to be followed in its entirety to the urinary bladder however within the distal aspect of the ureter, there is a 6 mm calculus.  The right ureter is grossly unremarkable along its visualized course.  The urinary bladder is unremarkable.  The uterus is absent.  There may be a hemorrhagic cyst involving the right ovary measuring 2.5 cm.  The left adnexa is grossly unremarkable.  There is strand-like fluid in the deep pelvis, may reflect that of surgical change or sequela from previous abscess in the region.    Surgical changes are noted of colectomy.  There is right lower quadrant ileostomy noting a small parastomal hernia involving a loop of decompressed small bowel.  No findings to suggest bowel obstruction.  No findings to suggest bowel wall inflammation or thickening.    Degenerative changes are noted of the spine.  No significant inguinal lymphadenopathy.  Surgical changes are noted of the anterior abdominal wall without focal organized fluid collection.  Gluteal calcifications noted.                               X-Ray Chest AP Portable (Final  result)  Result time 09/15/20 15:44:43    Final result by Salomon Alvarado MD (09/15/20 15:44:43)                 Impression:      No acute radiographic findings in the chest.      Electronically signed by: Salomon Alvarado MD  Date:    09/15/2020  Time:    15:44             Narrative:    EXAMINATION:  XR CHEST AP PORTABLE    CLINICAL HISTORY:  Sepsis;    TECHNIQUE:  Single frontal view of the chest was performed.    COMPARISON:  05/31/2019    FINDINGS:  Left subclavian chest port in stable position.  Cardiomediastinal silhouette and pulmonary vascularity are within normal range.  Lungs are symmetrically expanded and show no evidence of significant focal consolidation, large effusion or pneumothorax.  Bones show no acute abnormalities.                                 Medical Decision Making:   History:   Old Medical Records: I decided to obtain old medical records.  Initial Assessment:   Emergent evaluation of a 40-year-old female who presents to the emergency department with complaints of left flank pain, fever (T max 102.7), urinary urgency that worsened since yesterday. She was diagnosed with UTI 2 weeks ago and completed full course of Keflex. Followed up with urology yesterday however was not started on additional antibiotics. Patient is febrile, tachycardiac, and nontoxic appearing. Will initiate sepsis workup, CT, meds, fluids, and continue to monitor.   Differential Diagnosis:   DDx includes but is not limited to pyelonephritis, nephrolithiasis, renal abscess, infected stone.   Independently Interpreted Test(s):   I have ordered and independently interpreted X-rays - see prior notes.  I have ordered and independently interpreted EKG Reading(s) - see prior notes  Clinical Tests:   Lab Tests: Ordered and Reviewed  Radiological Study: Ordered and Reviewed  Medical Tests: Ordered and Reviewed  ED Management:  UA with 3+ Ketones, 2+ blood, 32 RBCs, occasional bacteria.   No severe metabolic or hematologic  derangements. Kidney function baseline.   Lactic acid 1.7  ESR 51, CRP 80  CT reveals mild left hydroureteronephrosis secondary to 6 mm calculus within the distal left ureter.   Urology consulted - plan to take to OR for stent placement tonight.   Patient agreeable with this plan. All questions answered.   The patient's history, physical exam, and plan of care was discussed with and agreed upon with my supervising physician.                      ED Course as of Sep 15 1829   Tue Sep 15, 2020   1654 WBC: 4.16 [JM]   1654 Hemoglobin: 13.4 [JM]   1654 Hematocrit: 41.5 [JM]   1654 Platelets: 223 [JM]   1654 Sodium: 138 [JM]   1654 Potassium(!): 3.2 [JM]   1654 Glucose: 81 [JM]   1654 BUN, Bld: 9 [JM]   1654 Creatinine: 0.7 [JM]   1654 Sed Rate(!): 51 [JM]   1654 CRP(!): 80.3 [JM]   1654 Lactate, Favian: 1.7 [JM]   1654 Occult Blood UA(!): 2+ [JM]   1654 RBC, UA(!): 32 [JM]   1654 Bacteria, UA: Occasional [JM]      ED Course User Index  [JM] Thelma Moon PA-C            Clinical Impression:     1. Ureteral stone    2. Flank pain    3. History of recurrent UTI (urinary tract infection)    4. Left ureteral stone                ED Disposition Condition    Observation                             Thelma Moon PA-C  09/15/20 1829

## 2020-09-15 NOTE — ANESTHESIA PAT ROS NOTE
[facility]  Anesthesia Pre-Operative Evaluation         Patient Name: Ivy Salgado  YOB: 1980  MRN: [id number]    SUBJECTIVE:     Pre-operative evaluation for Procedure(s) (LRB):  CYSTOSCOPY, WITH URETERAL STENT INSERTION (Left)     09/15/2020    Ivy Salgado is a 40 y.o. female w/ a significant PMHx of HTN, Crohn's disease, nephrolithiasis, recurrent UTIs who presents with a Left ureteral stone.    Patient now presents for the above procedure(s).    TTE 3/12/20  · Normal left ventricular systolic function. The estimated ejection fraction is 63%.  · No wall motion abnormalities.  · Normal LV diastolic function.  · Normal right ventricular systolic function.  · Normal central venous pressure (3 mmHg).    EKG 9/15/20  Sinus tachycardia   Otherwise normal ECG   When compared with ECG of 11-MAR-2020 10:24,   No significant change was found     LDA: None documented.    Prev airway: DL, Sarmiento 2, Grade I, ETT 7.0    Drips: None documented.    Patient Active Problem List   Diagnosis    Pelvic mass    S/P ileostomy    Crohn's disease of small intestine    Generalized anxiety disorder    Herpes genitalis in women    Ovarian cyst    Adnexal adhesions    Adhesions of uterus    Cyst of right ovary    Joint pain    Fatigue    Abdominal pain    Crohn's disease of small intestine with complication    Current mild episode of major depressive disorder without prior episode    Granulomatous colitis    Appetite impaired    Atrial tachycardia    Recurrent UTI    Vitamin D deficiency    Palpitations    Sinus tachycardia    Multiple thyroid nodules    Urinary tract infection with hematuria    Osteopenia of multiple sites    Diarrhea    Transient neurological symptoms    Chronic, continuous use of opioids    Acid reflux    SOBOE (shortness of breath on exertion)    Poor venous access    Alkaline phosphatase elevation- based on lab from 4/9/2019     S/P ExLap, BSO,  5/30/19    Premature surgical menopause    Complex partial epilepsy with generalization and with intractable epilepsy    History of recurrent UTI (urinary tract infection)    Seizures    Pelvic pain in female    Left ureteral stone       Review of patient's allergies indicates:   Allergen Reactions    Azathioprine sodium Other (See Comments)     Other reaction(s): pancreatitis  Other reaction(s): pancreatitis    Methotrexate analogues Other (See Comments)     leukopenia    Stelara [ustekinumab] Other (See Comments)     Multiple infections    Zofran [ondansetron hcl (pf)] Other (See Comments)     Per patient causes prolong QT    Vancomycin analogues Hives    Morphine Itching and Other (See Comments)     Other reaction(s): Itching    Bactrim [sulfamethoxazole-trimethoprim] Palpitations    Ciprofloxacin Palpitations       Current Outpatient Medications:  No current facility-administered medications for this encounter.     Current Outpatient Medications:     cephALEXin (KEFLEX) 250 MG capsule, TAKE 1 CAPSULE(250 MG) BY MOUTH EVERY DAY, Disp: 30 capsule, Rfl: 11    clonazePAM (KLONOPIN) 1 MG tablet, TAKE 1 AND 1/2 TABLETS BY MOUTH TWICE DAILY AS NEEDED FOR ANXIETY, Disp: 90 tablet, Rfl: 0    diphenoxylate-atropine 2.5-0.025 mg (LOMOTIL) 2.5-0.025 mg per tablet, Take 1 tablet by mouth 4 (four) times daily as needed. for diarrhea, Disp: 100 tablet, Rfl: 0    diphenoxylate-atropine 2.5-0.025 mg (LOMOTIL) 2.5-0.025 mg per tablet, Take 1 tablet by mouth 4 (four) times daily as needed. for diarrhea, Disp: 100 tablet, Rfl: 1    dronabinoL (MARINOL) 5 MG capsule, Take 1 capsule (5 mg total) by mouth 2 (two) times daily before meals., Disp: 60 capsule, Rfl: 0    famotidine (PEPCID) 20 MG tablet, Take 20 mg by mouth 2 (two) times daily., Disp: , Rfl:     fluconazole (DIFLUCAN) 100 MG tablet, TAKE 1 TABLET BY MOUTH ONCE DAILY, Disp: 7 tablet, Rfl: 0    fluconazole (DIFLUCAN) 150 MG Tab, Take 1 tablet (150 mg  total) by mouth every 72 hours as needed., Disp: 3 tablet, Rfl: 0    food supplemt, lactose-reduced (BOOST BREEZE NUTRITIONAL) 0.04-1.05 gram-kcal/mL Liqd, Use 3 times per day, Disp: 6399 mL, Rfl: 5    gabapentin (NEURONTIN) 300 MG capsule, Take 1 capsule (300 mg total) by mouth once daily. After two weeks, take one twice daily, Disp: 60 capsule, Rfl: 11    loperamide (IMODIUM) 2 mg capsule, Take 2 mg by mouth daily as needed for Diarrhea., Disp: , Rfl:     mirtazapine (REMERON SOL-TAB) 30 MG disintegrating tablet, Take 1 tablet (30 mg total) by mouth nightly., Disp: 90 tablet, Rfl: 3    multivitamin (ONE DAILY MULTIVITAMIN) per tablet, Take 1 tablet by mouth once daily., Disp: , Rfl:     oxyCODONE-acetaminophen (PERCOCET) 5-325 mg per tablet, Take 1 tablet by mouth every 4 (four) hours as needed for Pain., Disp: 20 tablet, Rfl: 0    oxyCODONE-acetaminophen (PERCOCET) 7.5-325 mg per tablet, Take 1 tablet by mouth every 4 (four) hours as needed for Pain., Disp: 10 tablet, Rfl: 0    potassium citrate (UROCIT-K 15) 15 mEq TbSR, Take 2 tablets by mouth 2 (two) times daily., Disp: 360 tablet, Rfl: 3    promethazine (PHENERGAN) 25 MG tablet, TAKE 1 TABLET(25 MG) BY MOUTH EVERY 6 HOURS AS NEEDED, Disp: 30 tablet, Rfl: 0    sumatriptan (IMITREX) 50 MG tablet, Take 1 tab po prn migraine; may repeat once in 2 hours prn; do not exceed 2 tabs in 24 hours (Patient not taking: Reported on 9/14/2020), Disp: 12 tablet, Rfl: 0    traMADoL (ULTRAM) 50 mg tablet, Take 1 tablet (50 mg total) by mouth every 6 (six) hours as needed for Pain., Disp: 10 tablet, Rfl: 0    valACYclovir (VALTREX) 500 MG tablet, TAKE 1 TABLET BY MOUTH EVERY DAY, Disp: 90 tablet, Rfl: 2    vedolizumab (ENTYVIO) 300 mg SolR injection, Inject 300 mg into the vein., Disp: , Rfl:     zolpidem (AMBIEN) 10 mg Tab, TAKE 1 TABLET BY MOUTH EVERY NIGHT AT BEDTIME, Disp: 30 tablet, Rfl: 1    Past Surgical History:   Procedure Laterality Date    ABDOMINAL  SURGERY      APPENDECTOMY      BILATERAL SALPINGO-OOPHORECTOMY (BSO) Bilateral 5/30/2019    Procedure: SALPINGO-OOPHORECTOMY, BILATERAL;  Surgeon: Rupa German MD;  Location: [facility] OR [address] [place] [address];  Service: OB/GYN;  Laterality: Bilateral;    BLADDER SURGERY      partial cystectomy due to fistula    CKC      COLON SURGERY      COLONOSCOPY      DIAGNOSTIC LAPAROSCOPY N/A 7/9/2020    Procedure: LAPAROSCOPY, DIAGNOSTIC;  Surgeon: Gislon El MD;  Location: [facility] OR;  Service: OB/GYN;  Laterality: N/A;    EXCISION OF MELANOMA  07/17/2019    ILEOSTOMY      LAPAROSCOPIC LYSIS OF ADHESIONS N/A 7/9/2020    Procedure: LYSIS, ADHESIONS, LAPAROSCOPIC;  Surgeon: Gilson El MD;  Location: [facility] OR;  Service: OB/GYN;  Laterality: N/A;    LYSIS OF ADHESIONS N/A 5/30/2019    Procedure: LYSIS, ADHESIONS;  Surgeon: Rupa German MD;  Location: [facility] OR [address] [place] [address];  Service: OB/GYN;  Laterality: N/A;    OOPHORECTOMY Right 04/16/2015    PORTACATH PLACEMENT  02/21/2017    SKIN BIOPSY      SMALL INTESTINE SURGERY      age 18 Y    TOTAL ABDOMINAL HYSTERECTOMY  04/16/2015    TOTAL COLECTOMY      TUBAL LIGATION  06/06/2012    UPPER GASTROINTESTINAL ENDOSCOPY         Social History     Socioeconomic History    Marital status: Single     Spouse name: Not on file    Number of children: Not on file    Years of education: Not on file    Highest education level: Not on file   Occupational History     Employer: [employer]   Social Needs    Financial resource strain: Not on file    Food insecurity     Worry: Not on file     Inability: Not on file    Transportation needs     Medical: Not on file     Non-medical: Not on file   Tobacco Use    Smoking status: Never Smoker    Smokeless tobacco: Never Used   Substance and Sexual Activity    Alcohol use: Not Currently     Alcohol/week: 0.0 standard drinks    Drug use: No    Sexual activity: Yes     Partners: Male     Birth control/protection: Surgical      Comment: HYST   Lifestyle    Physical activity     Days per week: Not on file     Minutes per session: Not on file    Stress: Not on file   Relationships    Social connections     Talks on phone: Not on file     Gets together: Not on file     Attends [employer] service: Not on file     Active member of club or organization: Not on file     Attends meetings of clubs or organizations: Not on file     Relationship status: Not on file   Other Topics Concern    Are you pregnant or think you may be? No    Breast-feeding No   Social History Narrative    Not on file       OBJECTIVE:     Vital Signs Range (Last 24H):  Temp:  [36.6 °C (97.8 °F)-37.8 °C (100.1 °F)]   Pulse:  []   Resp:  [12-20]   BP: (117-155)/(73-93)   SpO2:  [96 %-100 %]       Significant Labs:  Lab Results   Component Value Date    WBC 4.16 09/15/2020    HGB 13.4 09/15/2020    HCT 41.5 09/15/2020     09/15/2020    CHOL 163 01/28/2020    TRIG 132 01/28/2020    HDL 40 01/28/2020    ALT 24 09/15/2020    AST 27 09/15/2020     09/15/2020    K 3.2 (L) 09/15/2020     09/15/2020    CREATININE 0.7 09/15/2020    BUN 9 09/15/2020    CO2 19 (L) 09/15/2020    TSH 0.628 01/28/2020    INR 1.1 02/02/2018    HGBA1C 4.8 09/18/2018       Diagnostic Studies: No relevant studies.    EKG:   Results for orders placed or performed during the hospital encounter of 09/15/20   EKG 12-lead    Collection Time: 09/15/20  3:19 PM    Narrative    Test Reason : R10.9,    Vent. Rate : 109 BPM     Atrial Rate : 109 BPM     P-R Int : 118 ms          QRS Dur : 068 ms      QT Int : 322 ms       P-R-T Axes : 054 018 040 degrees     QTc Int : 433 ms    Sinus tachycardia  Otherwise normal ECG  When compared with ECG of 11-MAR-2020 10:24,  No significant change was found  Confirmed by Adriana Monsalve MD ([id number]) on 9/15/2020 3:24:56 PM    Referred By: [email or web address]   SELF           Confirmed By:Adriana Monsalve MD       2D ECHO:  TTE:  Results for orders placed or performed  during the hospital encounter of 03/11/20   Echo Color Flow Doppler? Yes; Bubble Contrast? Yes   Result Value Ref Range    Ascending aorta 3.07 cm    STJ 2.66 cm    AV mean gradient 4 mmHg    Ao peak richy 1.22 m/s    Ao VTI 23.73 cm    IVS 0.62 0.6 - 1.1 cm    LA size 2.90 cm    Left Atrium Major Axis 3.56 cm    Left Atrium Minor Axis 4.07 cm    LVIDD 3.96 3.5 - 6.0 cm    LVIDS 2.35 2.1 - 4.0 cm    LVOT diameter 1.99 cm    LVOT peak VTI 23.94 cm    PW 0.57 (A) 0.6 - 1.1 cm    MV Peak A Richy 0.61 m/s    E wave decelartion time 140.52 msec    MV Peak E Richy 0.98 m/s    PV Peak D Richy 0.55 m/s    PV Peak S Richy 0.78 m/s    RA Major Axis 3.04 cm    RA Width 2.50 cm    RVDD 2.05 cm    Sinus 2.92 cm    TAPSE 2.13 cm    TDI LATERAL 0.16 m/s    TDI SEPTAL 0.10 m/s    LA WIDTH 2.82 cm    LV Diastolic Volume 68.26 mL    LV Systolic Volume 19.22 mL    LVOT peak richy 1.35 m/s    LV LATERAL E/E' RATIO 6.13 m/s    LV SEPTAL E/E' RATIO 9.80 m/s    FS 41 %    LA volume 26.40 cm3    LV mass 62.58 g    Left Ventricle Relative Wall Thickness 0.29 cm    AV valve area 3.14 cm2    AV Velocity Ratio 1.11     AV index (prosthetic) 1.01     E/A ratio 1.61     Mean e' 0.13 m/s    Pulm vein S/D ratio 1.42     LVOT area 3.1 cm2    LVOT stroke volume 74.42 cm3    AV peak gradient 6 mmHg    E/E' ratio 7.54 m/s    LV Systolic Volume Index 12.8 mL/m2    LV Diastolic Volume Index 45.38 mL/m2    LA Volume Index 17.6 mL/m2    LV Mass Index 42 g/m2    BSA 1.51 m2    Right Atrial Pressure (from IVC) 3 mmHg    Narrative    · Normal left ventricular systolic function. The estimated ejection   fraction is 63%.  · No wall motion abnormalities.  · Normal LV diastolic function.  · Normal right ventricular systolic function.  · Normal central venous pressure (3 mmHg).          MARCIE:  No results found. However, due to the size of the patient record, not all encounters were searched. Please check Results Review for a complete set of results.    ASSESSMENT/PLAN:

## 2020-09-15 NOTE — PROGRESS NOTES
"Subjective:       Patient ID: Ivy Salgado is a 40 y.o. female.    Vitals:  height is 5' 1" (1.549 m) and weight is 56.7 kg (125 lb). Her skin temperature is 97.8 °F (36.6 °C). Her blood pressure is 134/90 (abnormal) and her pulse is 81. Her respiration is 16 and oxygen saturation is 97%.     Chief Complaint: Fever, Chills, and COVID-19 Concerns    Pt c/o left sided flank pain, nausea, and fever; since  yesterday     Fever   This is a new problem. The current episode started yesterday. The problem occurs constantly. The problem has been unchanged. The temperature was taken using an oral thermometer. Associated symptoms include nausea. Pertinent negatives include no congestion, coughing, ear pain, headaches, muscle aches, rash, sore throat, vomiting or wheezing. She has tried NSAIDs and acetaminophen for the symptoms. The treatment provided significant relief.       Constitution: Positive for chills and fever. Negative for sweating and fatigue.   HENT: Negative for ear pain, congestion, sinus pain, sinus pressure, sore throat and voice change.    Neck: Negative for painful lymph nodes.   Eyes: Negative for eye redness.   Respiratory: Negative for chest tightness, cough, sputum production, bloody sputum, COPD, shortness of breath, stridor, wheezing and asthma.    Gastrointestinal: Positive for nausea. Negative for vomiting.   Genitourinary: Positive for flank pain (left side ).   Musculoskeletal: Positive for muscle ache.   Skin: Negative for rash.   Allergic/Immunologic: Negative for seasonal allergies and asthma.   Neurological: Negative for headaches.   Hematologic/Lymphatic: Negative for swollen lymph nodes.       Objective:      Physical Exam   Constitutional: She is oriented to person, place, and time. She appears well-developed. She is cooperative.  Non-toxic appearance. She does not appear ill. No distress.   HENT:   Head: Normocephalic and atraumatic.   Ears:   Right Ear: Hearing, tympanic membrane, " external ear and ear canal normal.   Left Ear: Hearing, tympanic membrane, external ear and ear canal normal.   Nose: Nose normal. No mucosal edema, rhinorrhea or nasal deformity. No epistaxis. Right sinus exhibits no maxillary sinus tenderness and no frontal sinus tenderness. Left sinus exhibits no maxillary sinus tenderness and no frontal sinus tenderness.   Mouth/Throat: Uvula is midline, oropharynx is clear and moist and mucous membranes are normal. No trismus in the jaw. Normal dentition. No uvula swelling. No oropharyngeal exudate, posterior oropharyngeal edema or posterior oropharyngeal erythema.   Eyes: Conjunctivae and lids are normal. No scleral icterus.   Neck: Trachea normal, full passive range of motion without pain and phonation normal. Neck supple. No neck rigidity. No edema and no erythema present.   Cardiovascular: Normal rate, regular rhythm, normal heart sounds and normal pulses.   Pulmonary/Chest: Effort normal and breath sounds normal. No respiratory distress. She has no decreased breath sounds. She has no rhonchi.   Abdominal: Normal appearance.   Musculoskeletal: Normal range of motion.         General: No deformity.   Neurological: She is alert and oriented to person, place, and time. She exhibits normal muscle tone. Coordination normal.   Skin: Skin is warm, dry, intact, not diaphoretic and not pale. Psychiatric: Her speech is normal and behavior is normal. Judgment and thought content normal.   Nursing note and vitals reviewed.        Assessment:       1. Flank pain    2. Fever, unspecified fever cause    3. Hematuria, unspecified type    4. Renal colic on left side        Plan:         Flank pain  -     POCT Urinalysis, Dipstick, Automated, W/O Scope    Fever, unspecified fever cause  -     POCT COVID-19 Rapid Screening    Hematuria, unspecified type    Renal colic on left side

## 2020-09-15 NOTE — ED NOTES
Pt resting comfortably and independently repositioned in stretcher with bed locked in lowest position for safety. NAD noted at this time. Respirations even and unlabored and visible chest rise noted.  Patient offered bathroom assistance and denies need at this time. Pt instructed to call if assistance is needed. Pt on continuous cardiac, BP, and O2 monitoring. Call light within reach. Pt updated on POC. No needs at this time. Will continue to monitor.

## 2020-09-15 NOTE — ED NOTES
"Ivy Salgado, a 40 y.o. female presents to the ED w/ complaint of left sided flank pain radiating across back to right side, saw urologist yesterday and went to UC today, being treated for UTI completed Keflex abx last week. Pt reports continued dysuria and difficulty emptying bladder. Fever 102.7 taking ibuprofen and tylenol. Has had ovarian "remnants" surgeries, last one in July. Pt reports fatigue.     Triage note:  Chief Complaint   Patient presents with    Flank Pain     right flank pain, fever, hematuria - patient had a UTI two weeks ago and saw her urologist yesterday who also said it was a UTI.  Temp was 102.7 at Urgent Care     Review of patient's allergies indicates:   Allergen Reactions    Azathioprine sodium Other (See Comments)     Other reaction(s): pancreatitis  Other reaction(s): pancreatitis    Methotrexate analogues Other (See Comments)     leukopenia    Stelara [ustekinumab] Other (See Comments)     Multiple infections    Zofran [ondansetron hcl (pf)] Other (See Comments)     Per patient causes prolong QT    Vancomycin analogues Hives    Morphine Itching and Other (See Comments)     Other reaction(s): Itching    Bactrim [sulfamethoxazole-trimethoprim] Palpitations    Ciprofloxacin Palpitations     Past Medical History:   Diagnosis Date    Abnormal Pap smear 2007    Abnormal Pap smear 5/26/2011    Anemia     Anxiety     Arthritis     C. difficile diarrhea     Crohn's disease     Depression 8/5/2017    Encounter for blood transfusion     Genital HSV     History of colposcopy with cervical biopsy 2007 and 7/2011 2007-LYLA I  and 7/2011- LYLA I    Hypertension     Kidney stone     Kidney stone     Melanoma     Recurrent UTI 4/3/2013    S/P ileostomy 7/9/2012    Sterilization 6/23/2012     Adult Physical Assessment  LOC: Ivy Salgado, 40 y.o. female verified via two identifiers.  The patient is awake, alert, oriented and speaking appropriately at this " time.  APPEARANCE: Patient resting comfortably and appears to be in no acute distress at this time. Patient is clean and well groomed, patient's clothing is properly fastened.  SKIN:The skin is warm and dry, color consistent with ethnicity, patient has normal skin turgor and moist mucus membranes, skin intact, no breakdown or brusing noted.  MUSCULOSKELETAL: Patient moving all extremities well, no obvious swelling or deformities noted. Generalized fatigue.  RESPIRATORY: Airway is open and patent, respirations are spontaneous, patient has a normal effort and rate, no accessory muscle use noted.  CARDIAC: Patient has a tachycardic rate and rhythm, no periphreal edema noted in any extremity, capillary refill < 3 seconds in all extremities  ABDOMEN: Soft and non tender to palpation, no abdominal distention noted. Bowel sounds present in all four quadrants. Dysuria, diarrhea  NEUROLOGIC: Eyes open spontaneously, behavior appropriate to situation, follows commands, facial expression symmetrical, bilateral hand grasp equal and even, purposeful motor response noted, normal sensation in all extremities when touched with a finger.

## 2020-09-16 ENCOUNTER — TELEPHONE (OUTPATIENT)
Dept: UROLOGY | Facility: CLINIC | Age: 40
End: 2020-09-16

## 2020-09-16 ENCOUNTER — PATIENT MESSAGE (OUTPATIENT)
Dept: UROLOGY | Facility: CLINIC | Age: 40
End: 2020-09-16

## 2020-09-16 ENCOUNTER — PATIENT MESSAGE (OUTPATIENT)
Dept: OBSTETRICS AND GYNECOLOGY | Facility: CLINIC | Age: 40
End: 2020-09-16

## 2020-09-16 VITALS
RESPIRATION RATE: 16 BRPM | HEART RATE: 91 BPM | SYSTOLIC BLOOD PRESSURE: 130 MMHG | TEMPERATURE: 98 F | DIASTOLIC BLOOD PRESSURE: 90 MMHG | BODY MASS INDEX: 22.66 KG/M2 | HEIGHT: 61 IN | WEIGHT: 120 LBS | OXYGEN SATURATION: 99 %

## 2020-09-16 DIAGNOSIS — N83.209 CYST OF OVARY, UNSPECIFIED LATERALITY: Primary | ICD-10-CM

## 2020-09-16 DIAGNOSIS — N39.0 RECURRENT UTI: Primary | ICD-10-CM

## 2020-09-16 LAB
BACTERIA UR CULT: NO GROWTH
BACTERIA UR CULT: NO GROWTH
BASOPHILS # BLD AUTO: 0.01 K/UL (ref 0–0.2)
BASOPHILS NFR BLD: 0.3 % (ref 0–1.9)
DIFFERENTIAL METHOD: ABNORMAL
EOSINOPHIL # BLD AUTO: 0 K/UL (ref 0–0.5)
EOSINOPHIL NFR BLD: 0 % (ref 0–8)
ERYTHROCYTE [DISTWIDTH] IN BLOOD BY AUTOMATED COUNT: 13.5 % (ref 11.5–14.5)
HCT VFR BLD AUTO: 38.5 % (ref 37–48.5)
HGB BLD-MCNC: 12.8 G/DL (ref 12–16)
IMM GRANULOCYTES # BLD AUTO: 0.01 K/UL (ref 0–0.04)
IMM GRANULOCYTES NFR BLD AUTO: 0.3 % (ref 0–0.5)
LYMPHOCYTES # BLD AUTO: 0.8 K/UL (ref 1–4.8)
LYMPHOCYTES NFR BLD: 20.3 % (ref 18–48)
MCH RBC QN AUTO: 30 PG (ref 27–31)
MCHC RBC AUTO-ENTMCNC: 33.2 G/DL (ref 32–36)
MCV RBC AUTO: 90 FL (ref 82–98)
MONOCYTES # BLD AUTO: 0.1 K/UL (ref 0.3–1)
MONOCYTES NFR BLD: 3.6 % (ref 4–15)
NEUTROPHILS # BLD AUTO: 2.9 K/UL (ref 1.8–7.7)
NEUTROPHILS NFR BLD: 75.5 % (ref 38–73)
NRBC BLD-RTO: 0 /100 WBC
PLATELET # BLD AUTO: 264 K/UL (ref 150–350)
PMV BLD AUTO: 10.4 FL (ref 9.2–12.9)
RBC # BLD AUTO: 4.27 M/UL (ref 4–5.4)
WBC # BLD AUTO: 3.89 K/UL (ref 3.9–12.7)

## 2020-09-16 PROCEDURE — 25000003 PHARM REV CODE 250: Performed by: STUDENT IN AN ORGANIZED HEALTH CARE EDUCATION/TRAINING PROGRAM

## 2020-09-16 PROCEDURE — G0378 HOSPITAL OBSERVATION PER HR: HCPCS

## 2020-09-16 PROCEDURE — 85025 COMPLETE CBC W/AUTO DIFF WBC: CPT

## 2020-09-16 PROCEDURE — 63600175 PHARM REV CODE 636 W HCPCS: Performed by: STUDENT IN AN ORGANIZED HEALTH CARE EDUCATION/TRAINING PROGRAM

## 2020-09-16 PROCEDURE — 36415 COLL VENOUS BLD VENIPUNCTURE: CPT

## 2020-09-16 RX ORDER — AMOXICILLIN AND CLAVULANATE POTASSIUM 875; 125 MG/1; MG/1
1 TABLET, FILM COATED ORAL 2 TIMES DAILY
Qty: 14 TABLET | Refills: 0 | Status: SHIPPED | OUTPATIENT
Start: 2020-09-16 | End: 2020-09-23

## 2020-09-16 RX ORDER — OXYBUTYNIN CHLORIDE 5 MG/1
5 TABLET ORAL 3 TIMES DAILY PRN
Qty: 30 TABLET | Refills: 0 | Status: SHIPPED | OUTPATIENT
Start: 2020-09-16 | End: 2020-09-25

## 2020-09-16 RX ORDER — TAMSULOSIN HYDROCHLORIDE 0.4 MG/1
0.4 CAPSULE ORAL DAILY
Qty: 30 CAPSULE | Refills: 0 | Status: SHIPPED | OUTPATIENT
Start: 2020-09-16 | End: 2020-09-25

## 2020-09-16 RX ORDER — OXYCODONE AND ACETAMINOPHEN 10; 325 MG/1; MG/1
1 TABLET ORAL EVERY 4 HOURS PRN
Qty: 10 TABLET | Refills: 0 | Status: SHIPPED | OUTPATIENT
Start: 2020-09-16 | End: 2020-09-22 | Stop reason: DRUGHIGH

## 2020-09-16 RX ORDER — HEPARIN 100 UNIT/ML
5 SYRINGE INTRAVENOUS ONCE
Status: DISCONTINUED | OUTPATIENT
Start: 2020-09-16 | End: 2020-09-16 | Stop reason: HOSPADM

## 2020-09-16 RX ADMIN — OXYCODONE HYDROCHLORIDE 10 MG: 10 TABLET ORAL at 09:09

## 2020-09-16 RX ADMIN — FAMOTIDINE 20 MG: 20 TABLET ORAL at 09:09

## 2020-09-16 RX ADMIN — VALACYCLOVIR HYDROCHLORIDE 500 MG: 500 TABLET, FILM COATED ORAL at 11:09

## 2020-09-16 RX ADMIN — OXYCODONE HYDROCHLORIDE 10 MG: 10 TABLET ORAL at 05:09

## 2020-09-16 RX ADMIN — OXYBUTYNIN CHLORIDE 5 MG: 5 TABLET ORAL at 09:09

## 2020-09-16 RX ADMIN — DRONABINOL 5 MG: 5 CAPSULE ORAL at 06:09

## 2020-09-16 RX ADMIN — OXYCODONE HYDROCHLORIDE 10 MG: 10 TABLET ORAL at 12:09

## 2020-09-16 RX ADMIN — SODIUM CHLORIDE, SODIUM LACTATE, POTASSIUM CHLORIDE, AND CALCIUM CHLORIDE: .6; .31; .03; .02 INJECTION, SOLUTION INTRAVENOUS at 12:09

## 2020-09-16 NOTE — NURSING TRANSFER
Nursing Transfer Note      9/16/2020     Transfer To: [id number]    Transfer via stretcher    Transfer with mask    Transported by RN    Medicines sent: none    Chart send with patient: Yes    Notified: friend    Patient reassessed at: 9/15/20

## 2020-09-16 NOTE — TELEPHONE ENCOUNTER
----- Message from Nga Vegas MD sent at 9/16/2020  8:06 AM CDT -----  Please schedule patient next for ureteroscopy (Left) with Dr. Florentino next Wednesday 6/23 thank you

## 2020-09-16 NOTE — TRANSFER OF CARE
"Anesthesia Transfer of Care Note    Patient: Ivy Salgado    Procedure(s) Performed: Procedure(s) (LRB):  CYSTOSCOPY, WITH URETERAL STENT INSERTION (Left)    Patient location: PACU    Anesthesia Type: general    Transport from OR: Transported from OR on 6-10 L/min O2 by face mask with adequate spontaneous ventilation    Post pain: adequate analgesia    Post assessment: no apparent anesthetic complications and tolerated procedure well    Post vital signs: stable    Level of consciousness: awake    Nausea/Vomiting: no nausea/vomiting    Complications: none    Transfer of care protocol was followed      Last vitals:   Visit Vitals  /69   Pulse (!) 121   Temp 37 °C (98.6 °F) (Temporal)   Resp 16   Ht 5' 1" (1.549 m)   Wt 54.4 kg (120 lb)   LMP 03/30/2015   SpO2 100%   BMI 22.67 kg/m²     "

## 2020-09-16 NOTE — ANESTHESIA PROCEDURE NOTES
Intubation  Performed by: Jacob Reed CRNA  Authorized by: Jacob Reed CRNA     Intubation:     Induction:  Rapid sequence induction    Intubated:  Postinduction    Mask Ventilation:  N/a    Attempts:  1    Attempted By:  CRNA    Method of Intubation:  Direct    Blade:  Clifton 3    Laryngeal View Grade: Grade I - full view of chords      Difficult Airway Encountered?: No      Complications:  None    Airway Device Size:  7.0    Style/Cuff Inflation:  Cuffed    Inflation Amount (mL):  6    Tube secured:  21    Secured at:  The lips    Placement Verified By:  Capnometry    Complicating Factors:  None

## 2020-09-16 NOTE — PLAN OF CARE
09/16/20 1313   Final Note   Assessment Type Final Discharge Note   Anticipated Discharge Disposition Home   What phone number can be called within the next 1-3 days to see how you are doing after discharge? [phone]   Hospital Follow Up  Appt(s) scheduled? Yes   Discharge plans and expectations educations in teach back method with documentation complete? Yes     Future Appointments   Date Time Provider Department Center   9/20/2020 11:30 AM COVID TESTING, [facility]   9/25/2020 10:45 AM [facility]a [facility]

## 2020-09-16 NOTE — OP NOTE
[facility] Note    Date: 09/15/2020    Pre-Op Diagnosis: Left ureteral stone    Op Diagnosis: same    Procedure(s) Performed:    1. Cystoscopy with left ureteral JJ stent placement  2. Fluoroscopy < 1 h    Specimen(s): Urine for culture    Staff Surgeon: Sascha Florentino MD    Assistant Surgeon: Aramis Ennis MD    Anesthesia: Monitored Local Anesthesia with Sedation    Indications: Ivy Salgado is a 40 y.o. female with left ureteral stone and fever.    Findings:    Left 6 Fr x 24 cm JJ ureteral stent placed without issue  Contrast present in bilateral collecting systems and bladder from CT scan    Estimated Blood Loss: min    Drains:  6 [place] x 24 cm left JJ ureteral stent without strings    Procedure in Detail:  After risks, benefits and possible complications of the procedure were explained, the patient elected to undergo the procedure and informed consent was obtained. All questions were answered in the chance-operative area. The patient was transferred to the cystoscopy suite and placed on the fluoroscopy table in the supine position.  SCDs were applied and working. Time out was performed, chance-procedural antibiotics were given. Anesthesia was administered.  After adequate anesthesia the patient was placed in dorsal lithotomy position and prepped and draped in the usual sterile fashion.     A rigid cystoscope in a 22 Fr sheath was introduced into the patients bladder per urethra. This passed easily.  The entire urethra was visualized and revealed no strictures or masses.  Cystoscopy was performed which showed the right and left ureteral orifices in the normal anatomic position.  There were no bladder tumors, no  trabeculations, and no stones.      Our attention was turned to the patient's left ureteral orifice.  A motion wire was advanced up the left ureteral orifice to the level of the expected renal pelvis.  This was confirmed using fluoroscopy.     We then passed a 6 Fr x 24 cm JJ  ureteral stent without strings over the wire to the level of the renal pelvis under direct vision as well as flouroscopy. The guide wire was removed.  A 180 degree coil was observed in the renal pelvis as well as the bladder using fluoroscopy.  A 180 degree coil was also seen using direct visualization in the bladder.     The patient tolerated the procedure well and was transferred to the recovery room in stable condition.      Disposition: The patient will be admitted overnight for observation.      Aramis Ennis MD

## 2020-09-16 NOTE — DISCHARGE SUMMARY
[facility]  DISCHARGE SUMMARY      Admit Date:  9/15/2020    Discharge Date and Time:  9/16/2020  12:00 PM    Attending Physician:  Sascha Florentino MD     Discharge Provider:  Nga Vegas MD     Reason for Admission:  Left ureteral stone     Procedures Performed:  Procedure(s) (LRB):  CYSTOSCOPY, WITH URETERAL STENT INSERTION (Left)    Hospital Course:  Please see the preoperative H&P and other available documentation for full details related to history prior to this admission.  Briefly, Ivy Salgado is a 40 y.o. female who was admitted following scheduled elective surgery for Left ureteral stone    Following a complete preoperative discussion of the risks and benefits of surgery with signed informed consent, the patient was taken to the operating room on 9/15/2020 and underwent the above stated procedures.  The patient tolerated surgery well and there were no complications.  Please see the operative report for full intraoperative findings and details.  Postoperatively, the patient did well and was transferred from the PACU to the floor in stable condition where they had a stable and uncomplicated hospital course.  Labs and vital signs remained stable and appropriate throughout course.  Diet was advanced as tolerated and the patient's pain was controlled on oral pain medications without problem.  Currently, the patient is doing well at 1 Day Post-Op and is stable and appropriate for discharge home at this time.      Consults:  None.    Significant Diagnostic Studies:   Recent Labs   Lab 09/15/20  1517 09/16/20  0518   WBC 4.16 3.89*   HGB 13.4 12.8   HCT 41.5 38.5    264     Recent Labs   Lab 09/15/20  1517      K 3.2*      CO2 19*   BUN 9   CREATININE 0.7   GLU 81   CALCIUM 8.6*   AST 27   ALT 24   ALKPHOS 91   BILITOT 0.4   PROT 7.6   ALBUMIN 3.6   CRP 80.3*   SEDRATE 51*     Recent Labs   Lab 09/15/20  1517   LACTATE 1.7   No results for input(s): PH, PCO2,  PO2, HCO3 in the last 72 hours.      Final Diagnoses:   Principal Problem:  Left ureteral stone   Secondary Diagnoses:    Active Hospital Problems    Diagnosis  POA    *Left ureteral stone [N20.1]  Yes    Ureteral stone [N20.1]  Yes      Resolved Hospital Problems   No resolved problems to display.       Discharged Condition:  Good    Disposition:  Home or Self Care    Follow Up/Patient Instructions:     Medications:  Reconciled Home Medications:    Current Discharge Medication List      START taking these medications    Details   amoxicillin-clavulanate 875-125mg (AUGMENTIN) 875-125 mg per tablet Take 1 tablet by mouth 2 (two) times a day. for 7 days  Qty: 14 tablet, Refills: 0      oxybutynin (DITROPAN) 5 MG Tab Take 1 tablet (5 mg total) by mouth 3 (three) times daily as needed (bladder spasms).  Qty: 30 tablet, Refills: 0      oxyCODONE-acetaminophen (PERCOCET)  mg per tablet Take 1 tablet by mouth every 4 (four) hours as needed for Pain.  Qty: 10 tablet, Refills: 0    Comments: Quantity prescribed more than 7 day supply? No      tamsulosin (FLOMAX) 0.4 mg Cap Take 1 capsule (0.4 mg total) by mouth once daily.  Qty: 30 capsule, Refills: 0         CONTINUE these medications which have NOT CHANGED    Details   cephALEXin (KEFLEX) 250 MG capsule TAKE 1 CAPSULE(250 MG) BY MOUTH EVERY DAY  Qty: 30 capsule, Refills: 11      clonazePAM (KLONOPIN) 1 MG tablet TAKE 1 AND 1/2 TABLETS BY MOUTH TWICE DAILY AS NEEDED FOR ANXIETY  Qty: 90 tablet, Refills: 0    Associated Diagnoses: Generalized anxiety disorder      !! diphenoxylate-atropine 2.5-0.025 mg (LOMOTIL) 2.5-0.025 mg per tablet Take 1 tablet by mouth 4 (four) times daily as needed. for diarrhea  Qty: 100 tablet, Refills: 0    Associated Diagnoses: Crohn's disease of small and large intestines with complication      !! diphenoxylate-atropine 2.5-0.025 mg (LOMOTIL) 2.5-0.025 mg per tablet Take 1 tablet by mouth 4 (four) times daily as needed. for diarrhea  Qty:  100 tablet, Refills: 1    Associated Diagnoses: Crohn's disease of small and large intestines with complication      dronabinoL (MARINOL) 5 MG capsule Take 1 capsule (5 mg total) by mouth 2 (two) times daily before meals.  Qty: 60 capsule, Refills: 0    Associated Diagnoses: Crohn's disease of both small and large intestine with complication; Encounter for long-term (current) use of high-risk medication      famotidine (PEPCID) 20 MG tablet Take 20 mg by mouth 2 (two) times daily.      !! fluconazole (DIFLUCAN) 100 MG tablet TAKE 1 TABLET BY MOUTH ONCE DAILY  Qty: 7 tablet, Refills: 0    Associated Diagnoses: Skin yeast infection      !! fluconazole (DIFLUCAN) 150 MG Tab Take 1 tablet (150 mg total) by mouth every 72 hours as needed.  Qty: 3 tablet, Refills: 0    Associated Diagnoses: Vaginal yeast infection      food supplemt, lactose-reduced (BOOST BREEZE NUTRITIONAL) 0.04-1.05 gram-kcal/mL Liqd Use 3 times per day  Qty: 6399 mL, Refills: 5      gabapentin (NEURONTIN) 300 MG capsule Take 1 capsule (300 mg total) by mouth once daily. After two weeks, take one twice daily  Qty: 60 capsule, Refills: 11      loperamide (IMODIUM) 2 mg capsule Take 2 mg by mouth daily as needed for Diarrhea.      mirtazapine (REMERON SOL-TAB) 30 MG disintegrating tablet Take 1 tablet (30 mg total) by mouth nightly.  Qty: 90 tablet, Refills: 3    Associated Diagnoses: Generalized anxiety disorder      multivitamin (ONE DAILY MULTIVITAMIN) per tablet Take 1 tablet by mouth once daily.      oxyCODONE-acetaminophen (PERCOCET) 5-325 mg per tablet Take 1 tablet by mouth every 4 (four) hours as needed for Pain.  Qty: 20 tablet, Refills: 0      oxyCODONE-acetaminophen (PERCOCET) 7.5-325 mg per tablet Take 1 tablet by mouth every 4 (four) hours as needed for Pain.  Qty: 10 tablet, Refills: 0      potassium citrate (UROCIT-K 15) 15 mEq TbSR Take 2 tablets by mouth 2 (two) times daily.  Qty: 360 tablet, Refills: 3      promethazine (PHENERGAN) 25  MG tablet TAKE 1 TABLET(25 MG) BY MOUTH EVERY 6 HOURS AS NEEDED  Qty: 30 tablet, Refills: 0      sumatriptan (IMITREX) 50 MG tablet Take 1 tab po prn migraine; may repeat once in 2 hours prn; do not exceed 2 tabs in 24 hours  Qty: 12 tablet, Refills: 0      traMADoL (ULTRAM) 50 mg tablet Take 1 tablet (50 mg total) by mouth every 6 (six) hours as needed for Pain.  Qty: 10 tablet, Refills: 0    Comments: Quantity prescribed more than 7 day supply? No      valACYclovir (VALTREX) 500 MG tablet TAKE 1 TABLET BY MOUTH EVERY DAY  Qty: 90 tablet, Refills: 2      vedolizumab (ENTYVIO) 300 mg SolR injection Inject 300 mg into the vein.      zolpidem (AMBIEN) 10 mg Tab TAKE 1 TABLET BY MOUTH EVERY NIGHT AT BEDTIME  Qty: 30 tablet, Refills: 1    Associated Diagnoses: Anxiety       !! - Potential duplicate medications found. Please discuss with provider.        Discharge Procedure Orders   Diet Adult Regular     Notify your health care provider if you experience any of the following:  temperature >100.4     Notify your health care provider if you experience any of the following:  persistent nausea and vomiting or diarrhea     Notify your health care provider if you experience any of the following:  severe uncontrolled pain     Notify your health care provider if you experience any of the following:  redness, tenderness, or signs of infection (pain, swelling, redness, odor or green/yellow discharge around incision site)     No dressing needed     Activity as tolerated     Follow-up Information     Sascha Florentino MD On 9/23/2020.    Specialty: Urology  Why: ureteroscopy  Contact information:  [facility] [facility] [address]  [phone]                   Nga Vegas MD

## 2020-09-16 NOTE — PLAN OF CARE
Patient lives with 12y/o daughter at [address], [place] [address]. She has no equipment in home, does have ostomy supplies. Patient states she has ride home from hospital .       09/16/20 1024   Discharge Assessment   Assessment Type Discharge Planning Assessment   Confirmed/corrected address and phone number on facesheet? Yes   Assessment information obtained from? Patient   Expected Length of Stay (days) 1   Communicated expected length of stay with patient/caregiver yes   Prior to hospitilization cognitive status: Alert/Oriented   Prior to hospitalization functional status: Independent   Current cognitive status: Alert/Oriented   Current Functional Status: Independent   Lives With child(katalina), dependent   Able to Return to Prior Arrangements yes   Is patient able to care for self after discharge? Yes   Who are your caregiver(s) and their phone number(s)? Milan Salgado father [phone]   Patient's perception of discharge disposition home or selfcare   Readmission Within the Last 30 Days no previous admission in last 30 days   Patient currently being followed by outpatient case management? Unable to determine (comments)   Patient currently receives any other outside agency services? No   Equipment Currently Used at Home none   Do you have any problems affording any of your prescribed medications? No   Is the patient taking medications as prescribed? yes   Does the patient have transportation home? Yes   Transportation Anticipated family or friend will provide   Dialysis Name and Scheduled days n/a   Does the patient receive services at the [facility]? No   Discharge Plan A Home with family   Discharge Plan B Home Health;Home with family   DME Needed Upon Discharge  none   Patient/Family in Agreement with Plan yes

## 2020-09-16 NOTE — ANESTHESIA POSTPROCEDURE EVALUATION
Anesthesia Post Evaluation    Patient: Ivy Salgado    Procedure(s) Performed: Procedure(s) (LRB):  CYSTOSCOPY, WITH URETERAL STENT INSERTION (Left)    Final Anesthesia Type: general    Patient location during evaluation: PACU  Patient participation: Yes- Able to Participate  Level of consciousness: awake and alert  Post-procedure vital signs: reviewed and stable  Pain management: adequate  Airway patency: patent    PONV status at discharge: nausea (controlled)  Anesthetic complications: no      Cardiovascular status: hemodynamically stable  Respiratory status: unassisted and spontaneous ventilation  Hydration status: euvolemic  Follow-up not needed.          Vitals Value Taken Time   /68 09/15/20 1947   Temp 37 °C (98.6 °F) 09/15/20 1946   Pulse 110 09/15/20 1959   Resp 21 09/15/20 1959   SpO2 96 % 09/15/20 1959   Vitals shown include unvalidated device data.      No case tracking events are documented in the log.      Pain/Michael Score: Pain Rating Prior to Med Admin: 8 (9/15/2020  5:29 PM)

## 2020-09-17 ENCOUNTER — PATIENT MESSAGE (OUTPATIENT)
Dept: OBSTETRICS AND GYNECOLOGY | Facility: CLINIC | Age: 40
End: 2020-09-17

## 2020-09-17 ENCOUNTER — PATIENT MESSAGE (OUTPATIENT)
Dept: GASTROENTEROLOGY | Facility: CLINIC | Age: 40
End: 2020-09-17

## 2020-09-17 ENCOUNTER — PATIENT MESSAGE (OUTPATIENT)
Dept: SURGERY | Facility: HOSPITAL | Age: 40
End: 2020-09-17

## 2020-09-17 ENCOUNTER — HOSPITAL ENCOUNTER (EMERGENCY)
Facility: HOSPITAL | Age: 40
Discharge: HOME | End: 2020-09-18
Attending: EMERGENCY MEDICINE
Payer: MEDICARE

## 2020-09-17 ENCOUNTER — TELEPHONE (OUTPATIENT)
Dept: UROLOGY | Facility: CLINIC | Age: 40
End: 2020-09-17

## 2020-09-17 DIAGNOSIS — E87.6 HYPOKALEMIA: ICD-10-CM

## 2020-09-17 DIAGNOSIS — R10.9 LEFT FLANK PAIN: Primary | ICD-10-CM

## 2020-09-17 DIAGNOSIS — Z96.0 STATUS POST PLACEMENT OF URETERAL STENT: ICD-10-CM

## 2020-09-17 PROBLEM — T83.84XA PAIN DUE TO URETERAL STENT: Status: ACTIVE | Noted: 2020-09-17

## 2020-09-17 LAB
ALBUMIN SERPL BCP-MCNC: 3.5 G/DL (ref 3.5–5.2)
ALP SERPL-CCNC: 73 U/L (ref 55–135)
ALT SERPL W/O P-5'-P-CCNC: 15 U/L (ref 10–44)
ANION GAP SERPL CALC-SCNC: 11 MMOL/L (ref 8–16)
AST SERPL-CCNC: 16 U/L (ref 10–40)
BACTERIA #/AREA URNS AUTO: ABNORMAL /HPF
BASOPHILS # BLD AUTO: 0.02 K/UL (ref 0–0.2)
BASOPHILS NFR BLD: 0.3 % (ref 0–1.9)
BILIRUB SERPL-MCNC: 0.2 MG/DL (ref 0.1–1)
BILIRUB UR QL STRIP: NEGATIVE
BUN SERPL-MCNC: 10 MG/DL (ref 6–20)
CALCIUM SERPL-MCNC: 8.5 MG/DL (ref 8.7–10.5)
CHLORIDE SERPL-SCNC: 104 MMOL/L (ref 95–110)
CLARITY UR REFRACT.AUTO: ABNORMAL
CO2 SERPL-SCNC: 23 MMOL/L (ref 23–29)
COLOR UR AUTO: ABNORMAL
CREAT SERPL-MCNC: 0.8 MG/DL (ref 0.5–1.4)
DIFFERENTIAL METHOD: NORMAL
EOSINOPHIL # BLD AUTO: 0 K/UL (ref 0–0.5)
EOSINOPHIL NFR BLD: 0.2 % (ref 0–8)
ERYTHROCYTE [DISTWIDTH] IN BLOOD BY AUTOMATED COUNT: 13.6 % (ref 11.5–14.5)
EST. GFR  (AFRICAN AMERICAN): >60 ML/MIN/1.73 M^2
EST. GFR  (NON AFRICAN AMERICAN): >60 ML/MIN/1.73 M^2
GLUCOSE SERPL-MCNC: 96 MG/DL (ref 70–110)
GLUCOSE UR QL STRIP: ABNORMAL
HCT VFR BLD AUTO: 38.1 % (ref 37–48.5)
HGB BLD-MCNC: 12.3 G/DL (ref 12–16)
HGB UR QL STRIP: ABNORMAL
HYALINE CASTS UR QL AUTO: 0 /LPF
IMM GRANULOCYTES # BLD AUTO: 0 K/UL (ref 0–0.04)
IMM GRANULOCYTES NFR BLD AUTO: 0 % (ref 0–0.5)
KETONES UR QL STRIP: ABNORMAL
LEUKOCYTE ESTERASE UR QL STRIP: NEGATIVE
LYMPHOCYTES # BLD AUTO: 2.1 K/UL (ref 1–4.8)
LYMPHOCYTES NFR BLD: 31.3 % (ref 18–48)
MAGNESIUM SERPL-MCNC: 1.6 MG/DL (ref 1.6–2.6)
MCH RBC QN AUTO: 29.6 PG (ref 27–31)
MCHC RBC AUTO-ENTMCNC: 32.3 G/DL (ref 32–36)
MCV RBC AUTO: 92 FL (ref 82–98)
MICROSCOPIC COMMENT: ABNORMAL
MONOCYTES # BLD AUTO: 0.5 K/UL (ref 0.3–1)
MONOCYTES NFR BLD: 8.1 % (ref 4–15)
NEUTROPHILS # BLD AUTO: 3.9 K/UL (ref 1.8–7.7)
NEUTROPHILS NFR BLD: 60.1 % (ref 38–73)
NITRITE UR QL STRIP: NEGATIVE
NRBC BLD-RTO: 0 /100 WBC
PH UR STRIP: 6 [PH] (ref 5–8)
PLATELET # BLD AUTO: 244 K/UL (ref 150–350)
PMV BLD AUTO: 9.6 FL (ref 9.2–12.9)
POTASSIUM SERPL-SCNC: 2.9 MMOL/L (ref 3.5–5.1)
PROT SERPL-MCNC: 7.3 G/DL (ref 6–8.4)
PROT UR QL STRIP: ABNORMAL
RBC # BLD AUTO: 4.15 M/UL (ref 4–5.4)
RBC #/AREA URNS AUTO: >100 /HPF (ref 0–4)
SODIUM SERPL-SCNC: 138 MMOL/L (ref 136–145)
SP GR UR STRIP: 1.02 (ref 1–1.03)
SQUAMOUS #/AREA URNS AUTO: 2 /HPF
TSH SERPL DL<=0.005 MIU/L-ACNC: 2.29 UIU/ML (ref 0.4–4)
URN SPEC COLLECT METH UR: ABNORMAL
WBC # BLD AUTO: 6.54 K/UL (ref 3.9–12.7)
WBC #/AREA URNS AUTO: 2 /HPF (ref 0–5)

## 2020-09-17 PROCEDURE — 99285 EMERGENCY DEPT VISIT HI MDM: CPT | Mod: ,,, | Performed by: EMERGENCY MEDICINE

## 2020-09-17 PROCEDURE — 80053 COMPREHEN METABOLIC PANEL: CPT

## 2020-09-17 PROCEDURE — 93010 ELECTROCARDIOGRAM REPORT: CPT | Mod: ,,, | Performed by: INTERNAL MEDICINE

## 2020-09-17 PROCEDURE — 83735 ASSAY OF MAGNESIUM: CPT

## 2020-09-17 PROCEDURE — 96365 THER/PROPH/DIAG IV INF INIT: CPT

## 2020-09-17 PROCEDURE — 25000003 PHARM REV CODE 250: Performed by: PHYSICIAN ASSISTANT

## 2020-09-17 PROCEDURE — 63600175 PHARM REV CODE 636 W HCPCS: Performed by: PHYSICIAN ASSISTANT

## 2020-09-17 PROCEDURE — 96375 TX/PRO/DX INJ NEW DRUG ADDON: CPT

## 2020-09-17 PROCEDURE — 96361 HYDRATE IV INFUSION ADD-ON: CPT

## 2020-09-17 PROCEDURE — 93005 ELECTROCARDIOGRAM TRACING: CPT

## 2020-09-17 PROCEDURE — 84443 ASSAY THYROID STIM HORMONE: CPT

## 2020-09-17 PROCEDURE — 99285 EMERGENCY DEPT VISIT HI MDM: CPT | Mod: 25

## 2020-09-17 PROCEDURE — 93010 EKG 12-LEAD: ICD-10-PCS | Mod: ,,, | Performed by: INTERNAL MEDICINE

## 2020-09-17 PROCEDURE — 81001 URINALYSIS AUTO W/SCOPE: CPT

## 2020-09-17 PROCEDURE — 85025 COMPLETE CBC W/AUTO DIFF WBC: CPT

## 2020-09-17 PROCEDURE — 99285 PR EMERGENCY DEPT VISIT,LEVEL V: ICD-10-PCS | Mod: ,,, | Performed by: EMERGENCY MEDICINE

## 2020-09-17 PROCEDURE — 87086 URINE CULTURE/COLONY COUNT: CPT

## 2020-09-17 RX ORDER — HYDROMORPHONE HYDROCHLORIDE 1 MG/ML
1 INJECTION, SOLUTION INTRAMUSCULAR; INTRAVENOUS; SUBCUTANEOUS
Status: COMPLETED | OUTPATIENT
Start: 2020-09-17 | End: 2020-09-17

## 2020-09-17 RX ORDER — KETOROLAC TROMETHAMINE 30 MG/ML
10 INJECTION, SOLUTION INTRAMUSCULAR; INTRAVENOUS
Status: COMPLETED | OUTPATIENT
Start: 2020-09-17 | End: 2020-09-17

## 2020-09-17 RX ADMIN — KETOROLAC TROMETHAMINE 10 MG: 30 INJECTION, SOLUTION INTRAMUSCULAR at 08:09

## 2020-09-17 RX ADMIN — POTASSIUM BICARBONATE 50 MEQ: 978 TABLET, EFFERVESCENT ORAL at 09:09

## 2020-09-17 RX ADMIN — SODIUM CHLORIDE 1000 ML: 0.9 INJECTION, SOLUTION INTRAVENOUS at 08:09

## 2020-09-17 RX ADMIN — HYDROMORPHONE HYDROCHLORIDE 1 MG: 1 INJECTION, SOLUTION INTRAMUSCULAR; INTRAVENOUS; SUBCUTANEOUS at 10:09

## 2020-09-17 RX ADMIN — PROMETHAZINE HYDROCHLORIDE 12.5 MG: 25 INJECTION INTRAMUSCULAR; INTRAVENOUS at 08:09

## 2020-09-17 NOTE — TELEPHONE ENCOUNTER
----- Message from Jesi Noriega RN sent at 9/16/2020  5:31 PM CDT -----  Regarding: FW: Return to work    ----- Message -----  From: Emily Ross MA  Sent: 9/16/2020  12:00 PM CDT  To: Mishel Caicedo Staff  Subject: Return to work                                   Patient is asking should she stay home from work until her procedure on 9-23? Can you give her a call please.  Thanks

## 2020-09-18 VITALS
HEART RATE: 83 BPM | BODY MASS INDEX: 22.66 KG/M2 | TEMPERATURE: 98 F | RESPIRATION RATE: 20 BRPM | SYSTOLIC BLOOD PRESSURE: 109 MMHG | DIASTOLIC BLOOD PRESSURE: 67 MMHG | HEIGHT: 61 IN | OXYGEN SATURATION: 96 % | WEIGHT: 120 LBS

## 2020-09-18 PROCEDURE — 25000003 PHARM REV CODE 250: Performed by: PHYSICIAN ASSISTANT

## 2020-09-18 PROCEDURE — 63600175 PHARM REV CODE 636 W HCPCS: Performed by: PHYSICIAN ASSISTANT

## 2020-09-18 RX ORDER — HEPARIN 100 UNIT/ML
5 SYRINGE INTRAVENOUS
Status: COMPLETED | OUTPATIENT
Start: 2020-09-18 | End: 2020-09-18

## 2020-09-18 RX ORDER — OXYCODONE AND ACETAMINOPHEN 7.5; 325 MG/1; MG/1
1 TABLET ORAL EVERY 6 HOURS PRN
Qty: 12 TABLET | Refills: 0 | Status: SHIPPED | OUTPATIENT
Start: 2020-09-18 | End: 2021-01-22

## 2020-09-18 RX ORDER — OXYCODONE AND ACETAMINOPHEN 5; 325 MG/1; MG/1
1 TABLET ORAL
Status: COMPLETED | OUTPATIENT
Start: 2020-09-18 | End: 2020-09-18

## 2020-09-18 RX ADMIN — OXYCODONE HYDROCHLORIDE AND ACETAMINOPHEN 1 TABLET: 5; 325 TABLET ORAL at 12:09

## 2020-09-18 RX ADMIN — HEPARIN 500 UNITS: 100 SYRINGE at 12:09

## 2020-09-18 NOTE — SUBJECTIVE & OBJECTIVE
Past Medical History:   Diagnosis Date    Abnormal Pap smear 2007    Abnormal Pap smear 5/26/2011    Anemia     Anxiety     Arthritis     C. difficile diarrhea     Crohn's disease     Depression 8/5/2017    Encounter for blood transfusion     Genital HSV     History of colposcopy with cervical biopsy 2007 and 7/2011 2007-LYLA I  and 7/2011- LYLA I    Hypertension     Kidney stone     Kidney stone     Melanoma     Recurrent UTI 4/3/2013    S/P ileostomy 7/9/2012    Sterilization 6/23/2012       Past Surgical History:   Procedure Laterality Date    ABDOMINAL SURGERY      APPENDECTOMY      BILATERAL SALPINGO-OOPHORECTOMY (BSO) Bilateral 5/30/2019    Procedure: SALPINGO-OOPHORECTOMY, BILATERAL;  Surgeon: Rupa German MD;  Location: [facility] OR [address] [place] [address];  Service: OB/GYN;  Laterality: Bilateral;    BLADDER SURGERY      partial cystectomy due to fistula    CKC      COLON SURGERY      COLONOSCOPY      CYSTOSCOPY W/ URETERAL STENT PLACEMENT Left 9/15/2020    Procedure: CYSTOSCOPY, WITH URETERAL STENT INSERTION;  Surgeon: Sascha Florentino MD;  Location: [facility] OR [address] [place] [address];  Service: Urology;  Laterality: Left;    DIAGNOSTIC LAPAROSCOPY N/A 7/9/2020    Procedure: LAPAROSCOPY, DIAGNOSTIC;  Surgeon: Gilson El MD;  Location: [facility];  Service: OB/GYN;  Laterality: N/A;    EXCISION OF MELANOMA  07/17/2019    ILEOSTOMY      LAPAROSCOPIC LYSIS OF ADHESIONS N/A 7/9/2020    Procedure: LYSIS, ADHESIONS, LAPAROSCOPIC;  Surgeon: Gilson El MD;  Location: [facility] OR;  Service: OB/GYN;  Laterality: N/A;    LYSIS OF ADHESIONS N/A 5/30/2019    Procedure: LYSIS, ADHESIONS;  Surgeon: Rupa German MD;  Location: [facility] OR [address] [place] [address];  Service: OB/GYN;  Laterality: N/A;    OOPHORECTOMY Right 04/16/2015    PORTACATH PLACEMENT  02/21/2017    SKIN BIOPSY      SMALL INTESTINE SURGERY      age 18 Y    TOTAL ABDOMINAL HYSTERECTOMY  04/16/2015    TOTAL COLECTOMY      TUBAL LIGATION  06/06/2012     UPPER GASTROINTESTINAL ENDOSCOPY         Review of patient's allergies indicates:   Allergen Reactions    Azathioprine sodium Other (See Comments)     Other reaction(s): pancreatitis  Other reaction(s): pancreatitis    Methotrexate analogues Other (See Comments)     leukopenia    Stelara [ustekinumab] Other (See Comments)     Multiple infections    Zofran [ondansetron hcl (pf)] Other (See Comments)     Per patient causes prolong QT    Vancomycin analogues Hives    Morphine Itching and Other (See Comments)     Other reaction(s): Itching    Bactrim [sulfamethoxazole-trimethoprim] Palpitations    Ciprofloxacin Palpitations       Family History     Problem Relation (Age of Onset)    Cancer Father, Maternal Grandfather    Colon cancer Father    Crohn's disease Brother    Endometrial cancer Maternal Aunt    Hypertension Mother    Liver cancer Father    Skin cancer Maternal Grandfather          Tobacco Use    Smoking status: Never Smoker    Smokeless tobacco: Never Used   Substance and Sexual Activity    Alcohol use: Not Currently     Alcohol/week: 0.0 standard drinks    Drug use: No    Sexual activity: Yes     Partners: Male     Birth control/protection: Surgical     Comment: HYST       Review of Systems   Constitutional: Positive for appetite change. Negative for chills and fever.   HENT: Negative.    Eyes: Negative.    Respiratory: Negative for chest tightness and shortness of breath.    Cardiovascular: Positive for palpitations. Negative for chest pain.   Gastrointestinal: Positive for abdominal pain. Negative for nausea and vomiting.   Genitourinary: Positive for difficulty urinating, flank pain, frequency, hematuria and urgency. Negative for decreased urine volume.   Musculoskeletal: Negative for arthralgias and gait problem.   Skin: Negative for color change and rash.   Neurological: Negative for dizziness and headaches.   Psychiatric/Behavioral: Negative for agitation and confusion.       Objective:      Temp:  [98.3 °F (36.8 °C)] 98.3 °F (36.8 °C)  Pulse:  [] 93  Resp:  [11-20] 18  SpO2:  [95 %-99 %] 95 %  BP: (110-154)/() 110/72     Body mass index is 22.67 kg/m².    Date 09/17/20 0700 - 09/18/20 0659   Shift 9512-5578 7337-7234 1081-9864 24 Hour Total   INTAKE   IV Piggyback  50  50   Shift Total(mL/kg)  50(0.9)  50(0.9)   OUTPUT   Shift Total(mL/kg)       Weight (kg)  54.4 54.4 54.4          Drains     Drain                 Ileostomy 06/16/12 0000 RLQ 3015 days                Physical Exam   Nursing note and vitals reviewed.  Constitutional: She is oriented to person, place, and time.   HENT:   Head: Normocephalic and atraumatic.   Eyes: Pupils are equal, round, and reactive to light.   Cardiovascular: Normal rate.    Pulmonary/Chest: Effort normal. No respiratory distress.   Abdominal: Normal appearance. She exhibits no distension. There is no abdominal tenderness.   Mild left CVA tenderness  Midline laparotomy scar  RLQ ileostomy   Neurological: She is alert and oriented to person, place, and time.   Psychiatric: Her behavior is normal. Mood, judgment and thought content normal.       Significant Labs:    BMP:  Recent Labs   Lab 09/15/20  1517 09/17/20  2008    138   K 3.2* 2.9*    104   CO2 19* 23   BUN 9 10   CREATININE 0.7 0.8   CALCIUM 8.6* 8.5*       CBC:  Recent Labs   Lab 09/15/20  1517 09/16/20  0518 09/17/20 2008   WBC 4.16 3.89* 6.54   HGB 13.4 12.8 12.3   HCT 41.5 38.5 38.1    264 244       All pertinent labs results from the past 24 hours have been reviewed.    Significant Imaging:  All pertinent imaging results/findings from the past 24 hours have been reviewed.

## 2020-09-18 NOTE — CONSULTS
[facility]  Urology  Consult Note    Patient Name: Ivy Salgado  MRN: [id number]  Admission Date: 9/17/2020  Hospital Length of Stay: 0   Code Status: Prior   Attending Provider: Lana Bermudez MD   Consulting Provider: Gavino Newman MD  Primary Care Physician: Kalia Astorga MD  Principal Problem:Pain due to ureteral stent    Inpatient consult to Urology  Consult performed by: Gavino Newman MD  Consult ordered by: Vero Vila PA-C          Subjective:     HPI:  Ivy Salgado is a 40 y.o. female with a history of Crohn's disease, colovesical fistula as a child, recurrent UTIs, and nephrolithiasis who [place] [facility] [place]20 due to uncontrolled pain. Patient was recently [place] [facility] [place] due to fevers coupled with a 6 mm left distal ureteral stone. She underwent cystoscopy with left ureteral stent placement on 9/15. She had an uneventful hospitalization and was discharged home on 7 days of Augmentin. Both her clean catch and intra-op urine cultures have shown no growth.     She states that beginning yesterday, she has had worsened left flank pain coupled with dizziness and palpitations. In addition, she has had frequency, urgency, gross hematuria with a few small clots, suprapubic pain, and sensation of incomplete emptying. No fevers, chills, nausea, or vomiting. Her ileostomy output is at her baseline. She has had a poor appetite but states she is drinking adequate fluids. She has been taking all of her discharge medications as prescribed.  In the ED, she was initially tachycardic (responded to 1 L crystalloid bolus) but normotensive. Her WBC and Cr are both normal. Clean catch UA is nitrite negative and shows >100 RBC/hpf, 2 WBC/hpf, 2 squams, and no bacteria. KUB demonstrated her left ureteral stent to be in good position. PVR via bladder scan performed by myself was 14 mL. She has received ketorolac, dilaudid, and phenergan. Her  pain has improved.    She was previously on Keflex prophylaxis for recurrent UTI's which she has since discontinued. For Crohn's, she has been off Entyvio since recent surgery and UTI's. She has passed stones previously never requiring a stone procedure. Her additional surgical history includes completion proctocolectomy with end ileostomy for Crohn's in 2012, SHELLIE/RSO in 2015 (complicated by cystotomy, repaired with chromic), bilateral salpingo-oophroectomy in 2019 (for bilateral ovarian cysts, benign pathology, post op course complicated by pelvic abscess), and laparoscopic lysis of adhesions in 2020 for pelvic pain.   She had a colovesical fistula at age 18 treated with surgery and a prolonged Ureña.    Past Medical History:   Diagnosis Date    Abnormal Pap smear 2007    Abnormal Pap smear 5/26/2011    Anemia     Anxiety     Arthritis     C. difficile diarrhea     Crohn's disease     Depression 8/5/2017    Encounter for blood transfusion     Genital HSV     History of colposcopy with cervical biopsy 2007 and 7/2011 2007-LYLA I  and 7/2011- LYLA I    Hypertension     Kidney stone     Kidney stone     Melanoma     Recurrent UTI 4/3/2013    S/P ileostomy 7/9/2012    Sterilization 6/23/2012       Past Surgical History:   Procedure Laterality Date    ABDOMINAL SURGERY      APPENDECTOMY      BILATERAL SALPINGO-OOPHORECTOMY (BSO) Bilateral 5/30/2019    Procedure: SALPINGO-OOPHORECTOMY, BILATERAL;  Surgeon: Rupa German MD;  Location: [facility] OR [address] [place] [address];  Service: OB/GYN;  Laterality: Bilateral;    BLADDER SURGERY      partial cystectomy due to fistula    CKC      COLON SURGERY      COLONOSCOPY      CYSTOSCOPY W/ URETERAL STENT PLACEMENT Left 9/15/2020    Procedure: CYSTOSCOPY, WITH URETERAL STENT INSERTION;  Surgeon: Sascha Florentino MD;  Location: [facility] OR [address] [place] [address];  Service: Urology;  Laterality: Left;    DIAGNOSTIC LAPAROSCOPY N/A 7/9/2020    Procedure: LAPAROSCOPY, DIAGNOSTIC;  Surgeon:  Gilson El MD;  Location: [facility] OR;  Service: OB/GYN;  Laterality: N/A;    EXCISION OF MELANOMA  07/17/2019    ILEOSTOMY      LAPAROSCOPIC LYSIS OF ADHESIONS N/A 7/9/2020    Procedure: LYSIS, ADHESIONS, LAPAROSCOPIC;  Surgeon: Gilson El MD;  Location: [facility] OR;  Service: OB/GYN;  Laterality: N/A;    LYSIS OF ADHESIONS N/A 5/30/2019    Procedure: LYSIS, ADHESIONS;  Surgeon: Rupa German MD;  [address]  Service: OB/GYN;  Laterality: N/A;    OOPHORECTOMY Right 04/16/2015    PORTACATH PLACEMENT  02/21/2017    SKIN BIOPSY      SMALL INTESTINE SURGERY      age 18 Y    TOTAL ABDOMINAL HYSTERECTOMY  04/16/2015    TOTAL COLECTOMY      TUBAL LIGATION  06/06/2012    UPPER GASTROINTESTINAL ENDOSCOPY         Review of patient's allergies indicates:   Allergen Reactions    Azathioprine sodium Other (See Comments)     Other reaction(s): pancreatitis  Other reaction(s): pancreatitis    Methotrexate analogues Other (See Comments)     leukopenia    Stelara [ustekinumab] Other (See Comments)     Multiple infections    Zofran [ondansetron hcl (pf)] Other (See Comments)     Per patient causes prolong QT    Vancomycin analogues Hives    Morphine Itching and Other (See Comments)     Other reaction(s): Itching    Bactrim [sulfamethoxazole-trimethoprim] Palpitations    Ciprofloxacin Palpitations       Family History     Problem Relation (Age of Onset)    Cancer Father, Maternal Grandfather    Colon cancer Father    Crohn's disease Brother    Endometrial cancer Maternal Aunt    Hypertension Mother    Liver cancer Father    Skin cancer Maternal Grandfather          Tobacco Use    Smoking status: Never Smoker    Smokeless tobacco: Never Used   Substance and Sexual Activity    Alcohol use: Not Currently     Alcohol/week: 0.0 standard drinks    Drug use: No    Sexual activity: Yes     Partners: Male     Birth control/protection: Surgical     Comment: HYST       Review of Systems    Constitutional: Positive for appetite change. Negative for chills and fever.   HENT: Negative.    Eyes: Negative.    Respiratory: Negative for chest tightness and shortness of breath.    Cardiovascular: Positive for palpitations. Negative for chest pain.   Gastrointestinal: Positive for abdominal pain. Negative for nausea and vomiting.   Genitourinary: Positive for difficulty urinating, flank pain, frequency, hematuria and urgency. Negative for decreased urine volume.   Musculoskeletal: Negative for arthralgias and gait problem.   Skin: Negative for color change and rash.   Neurological: Negative for dizziness and headaches.   Psychiatric/Behavioral: Negative for agitation and confusion.       Objective:     Temp:  [98.3 °F (36.8 °C)] 98.3 °F (36.8 °C)  Pulse:  [] 93  Resp:  [11-20] 18  SpO2:  [95 %-99 %] 95 %  BP: (110-154)/() 110/72     Body mass index is 22.67 kg/m².    Date 09/17/20 0700 - 09/18/20 0659   Shift 3078-0834 9710-6813 6951-3951 24 Hour Total   INTAKE   IV Piggyback  50  50   Shift Total(mL/kg)  50(0.9)  50(0.9)   OUTPUT   Shift Total(mL/kg)       Weight (kg)  54.4 54.4 54.4          Drains     Drain                 Ileostomy 06/16/12 0000 RLQ 3015 days                Physical Exam   Nursing note and vitals reviewed.  Constitutional: She is oriented to person, place, and time.   HENT:   Head: Normocephalic and atraumatic.   Eyes: Pupils are equal, round, and reactive to light.   Cardiovascular: Normal rate.    Pulmonary/Chest: Effort normal. No respiratory distress.   Abdominal: Normal appearance. She exhibits no distension. There is no abdominal tenderness.   Mild left CVA tenderness  Midline laparotomy scar  RLQ ileostomy   Neurological: She is alert and oriented to person, place, and time.   Psychiatric: Her behavior is normal. Mood, judgment and thought content normal.       Significant Labs:    BMP:  Recent Labs   Lab 09/15/20  1517 09/17/20 2008    138   K 3.2* 2.9*   CL  107 104   CO2 19* 23   BUN 9 10   CREATININE 0.7 0.8   CALCIUM 8.6* 8.5*       CBC:  Recent Labs   Lab 09/15/20  1517 09/16/20  0518 09/17/20 2008   WBC 4.16 3.89* 6.54   HGB 13.4 12.8 12.3   HCT 41.5 38.5 38.1    264 244       All pertinent labs results from the past 24 hours have been reviewed.    Significant Imaging:  All pertinent imaging results/findings from the past 24 hours have been reviewed.      Assessment and Plan:     * Pain due to ureteral stent  Ivy Salgado is a 40 y.o. female with a history of Crohn's disease, colovesical fistula as a child, recurrent UTIs, and nephrolithiasis who presents to the [facility] ED on 9/17/20 due to uncontrolled pain. Patient's symptoms are consistent with stent colic.     - No current urologic intervention indicated at this time.  - Recommend discharging home with more PO narcotic pain medications. Patient should continue flomax, oxybutynin, and augmentin.  - Urine culture ordered as an add-on.  - Follow-up for scheduled ureteroscopy on 9/23/20 for definitive stone treatment.        VTE Risk Mitigation (From admission, onward)    None          Thank you for your consult. I will sign off. Please contact us if you have any additional questions.    Gavino Newman MD  Urology  [facility]

## 2020-09-18 NOTE — H&P (VIEW-ONLY)
[facility]  Urology  Consult Note    Patient Name: Ivy Salgado  MRN: [id number]  Admission Date: 9/17/2020  Hospital Length of Stay: 0   Code Status: Prior   Attending Provider: Lana Bermudez MD   Consulting Provider: Gavino Newman MD  Primary Care Physician: Kalia Astorga MD  Principal Problem:Pain due to ureteral stent    Inpatient consult to Urology  Consult performed by: Gavino Newman MD  Consult ordered by: Vero Vila PA-C          Subjective:     HPI:  Ivy Salgado is a 40 y.o. female with a history of Crohn's disease, colovesical fistula as a child, recurrent UTIs, and nephrolithiasis who [place] the [facility] [place] on 9/17/20 due to uncontrolled pain. Patient was recently admitted to [facility] [place]15-9/16 due to fevers coupled with a 6 mm left distal ureteral stone. She underwent cystoscopy with left ureteral stent placement on 9/15. She had an uneventful hospitalization and was discharged home on 7 days of Augmentin. Both her clean catch and intra-op urine cultures have shown no growth.     She states that beginning yesterday, she has had worsened left flank pain coupled with dizziness and palpitations. In addition, she has had frequency, urgency, gross hematuria with a few small clots, suprapubic pain, and sensation of incomplete emptying. No fevers, chills, nausea, or vomiting. Her ileostomy output is at her baseline. She has had a poor appetite but states she is drinking adequate fluids. She has been taking all of her discharge medications as prescribed.  In the ED, she was initially tachycardic (responded to 1 L crystalloid bolus) but normotensive. Her WBC and Cr are both normal. Clean catch UA is nitrite negative and shows >100 RBC/hpf, 2 WBC/hpf, 2 squams, and no bacteria. KUB demonstrated her left ureteral stent to be in good position. PVR via bladder scan performed by myself was 14 mL. She has received ketorolac, dilaudid, and phenergan. Her  pain has improved.    She was previously on Keflex prophylaxis for recurrent UTI's which she has since discontinued. For Crohn's, she has been off Entyvio since recent surgery and UTI's. She has passed stones previously never requiring a stone procedure. Her additional surgical history includes completion proctocolectomy with end ileostomy for Crohn's in 2012, SHELLIE/RSO in 2015 (complicated by cystotomy, repaired with chromic), bilateral salpingo-oophroectomy in 2019 (for bilateral ovarian cysts, benign pathology, post op course complicated by pelvic abscess), and laparoscopic lysis of adhesions in 2020 for pelvic pain.   She had a colovesical fistula at age 18 treated with surgery and a prolonged Ureña.    Past Medical History:   Diagnosis Date    Abnormal Pap smear 2007    Abnormal Pap smear 5/26/2011    Anemia     Anxiety     Arthritis     C. difficile diarrhea     Crohn's disease     Depression 8/5/2017    Encounter for blood transfusion     Genital HSV     History of colposcopy with cervical biopsy 2007 and 7/2011 2007-LYLA I  and 7/2011- LYLA I    Hypertension     Kidney stone     Kidney stone     Melanoma     Recurrent UTI 4/3/2013    S/P ileostomy 7/9/2012    Sterilization 6/23/2012       Past Surgical History:   Procedure Laterality Date    ABDOMINAL SURGERY      APPENDECTOMY      BILATERAL SALPINGO-OOPHORECTOMY (BSO) Bilateral 5/30/2019    Procedure: SALPINGO-OOPHORECTOMY, BILATERAL;  Surgeon: Rupa German MD;  Location: [facility] OR [address] [place] [address];  Service: OB/GYN;  Laterality: Bilateral;    BLADDER SURGERY      partial cystectomy due to fistula    CKC      COLON SURGERY      COLONOSCOPY      CYSTOSCOPY W/ URETERAL STENT PLACEMENT Left 9/15/2020    Procedure: CYSTOSCOPY, WITH URETERAL STENT INSERTION;  Surgeon: Sascha Florentino MD;  Location: [facility] OR [address] [place] [address];  Service: Urology;  Laterality: Left;    DIAGNOSTIC LAPAROSCOPY N/A 7/9/2020    Procedure: LAPAROSCOPY, DIAGNOSTIC;  Surgeon:  Gilson El MD;  Location: [facility] OR;  Service: OB/GYN;  Laterality: N/A;    EXCISION OF MELANOMA  07/17/2019    ILEOSTOMY      LAPAROSCOPIC LYSIS OF ADHESIONS N/A 7/9/2020    Procedure: LYSIS, ADHESIONS, LAPAROSCOPIC;  Surgeon: Gilson El MD;  Location: [facility] OR;  Service: OB/GYN;  Laterality: N/A;    LYSIS OF ADHESIONS N/A 5/30/2019    Procedure: LYSIS, ADHESIONS;  Surgeon: Rupa German MD;  [address]  Service: OB/GYN;  Laterality: N/A;    OOPHORECTOMY Right 04/16/2015    PORTACATH PLACEMENT  02/21/2017    SKIN BIOPSY      SMALL INTESTINE SURGERY      age 18 Y    TOTAL ABDOMINAL HYSTERECTOMY  04/16/2015    TOTAL COLECTOMY      TUBAL LIGATION  06/06/2012    UPPER GASTROINTESTINAL ENDOSCOPY         Review of patient's allergies indicates:   Allergen Reactions    Azathioprine sodium Other (See Comments)     Other reaction(s): pancreatitis  Other reaction(s): pancreatitis    Methotrexate analogues Other (See Comments)     leukopenia    Stelara [ustekinumab] Other (See Comments)     Multiple infections    Zofran [ondansetron hcl (pf)] Other (See Comments)     Per patient causes prolong QT    Vancomycin analogues Hives    Morphine Itching and Other (See Comments)     Other reaction(s): Itching    Bactrim [sulfamethoxazole-trimethoprim] Palpitations    Ciprofloxacin Palpitations       Family History     Problem Relation (Age of Onset)    Cancer Father, Maternal Grandfather    Colon cancer Father    Crohn's disease Brother    Endometrial cancer Maternal Aunt    Hypertension Mother    Liver cancer Father    Skin cancer Maternal Grandfather          Tobacco Use    Smoking status: Never Smoker    Smokeless tobacco: Never Used   Substance and Sexual Activity    Alcohol use: Not Currently     Alcohol/week: 0.0 standard drinks    Drug use: No    Sexual activity: Yes     Partners: Male     Birth control/protection: Surgical     Comment: HYST       Review of Systems    Constitutional: Positive for appetite change. Negative for chills and fever.   HENT: Negative.    Eyes: Negative.    Respiratory: Negative for chest tightness and shortness of breath.    Cardiovascular: Positive for palpitations. Negative for chest pain.   Gastrointestinal: Positive for abdominal pain. Negative for nausea and vomiting.   Genitourinary: Positive for difficulty urinating, flank pain, frequency, hematuria and urgency. Negative for decreased urine volume.   Musculoskeletal: Negative for arthralgias and gait problem.   Skin: Negative for color change and rash.   Neurological: Negative for dizziness and headaches.   Psychiatric/Behavioral: Negative for agitation and confusion.       Objective:     Temp:  [98.3 °F (36.8 °C)] 98.3 °F (36.8 °C)  Pulse:  [] 93  Resp:  [11-20] 18  SpO2:  [95 %-99 %] 95 %  BP: (110-154)/() 110/72     Body mass index is 22.67 kg/m².    Date 09/17/20 0700 - 09/18/20 0659   Shift 9102-2616 1753-3896 4795-4866 24 Hour Total   INTAKE   IV Piggyback  50  50   Shift Total(mL/kg)  50(0.9)  50(0.9)   OUTPUT   Shift Total(mL/kg)       Weight (kg)  54.4 54.4 54.4          Drains     Drain                 Ileostomy 06/16/12 0000 RLQ 3015 days                Physical Exam   Nursing note and vitals reviewed.  Constitutional: She is oriented to person, place, and time.   HENT:   Head: Normocephalic and atraumatic.   Eyes: Pupils are equal, round, and reactive to light.   Cardiovascular: Normal rate.    Pulmonary/Chest: Effort normal. No respiratory distress.   Abdominal: Normal appearance. She exhibits no distension. There is no abdominal tenderness.   Mild left CVA tenderness  Midline laparotomy scar  RLQ ileostomy   Neurological: She is alert and oriented to person, place, and time.   Psychiatric: Her behavior is normal. Mood, judgment and thought content normal.       Significant Labs:    BMP:  Recent Labs   Lab 09/15/20  1517 09/17/20 2008    138   K 3.2* 2.9*   CL  107 104   CO2 19* 23   BUN 9 10   CREATININE 0.7 0.8   CALCIUM 8.6* 8.5*       CBC:  Recent Labs   Lab 09/15/20  1517 09/16/20  0518 09/17/20 2008   WBC 4.16 3.89* 6.54   HGB 13.4 12.8 12.3   HCT 41.5 38.5 38.1    264 244       All pertinent labs results from the past 24 hours have been reviewed.    Significant Imaging:  All pertinent imaging results/findings from the past 24 hours have been reviewed.      Assessment and Plan:     * Pain due to ureteral stent  Ivy Salgado is a 40 y.o. female with a history of Crohn's disease, colovesical fistula as a child, recurrent UTIs, and nephrolithiasis who presents to the [facility] ED on 9/17/20 due to uncontrolled pain. Patient's symptoms are consistent with stent colic.     - No current urologic intervention indicated at this time.  - Recommend discharging home with more PO narcotic pain medications. Patient should continue flomax, oxybutynin, and augmentin.  - Urine culture ordered as an add-on.  - Follow-up for scheduled ureteroscopy on 9/23/20 for definitive stone treatment.        VTE Risk Mitigation (From admission, onward)    None          Thank you for your consult. I will sign off. Please contact us if you have any additional questions.    Gavino Newman MD  Urology  [facility]

## 2020-09-18 NOTE — HPI
Ivy Salgado is a 40 y.o. female with a history of Crohn's disease, colovesical fistula as a child, recurrent UTIs, and nephrolithiasis who presents to the [facility] ED on 9/17/20 due to uncontrolled pain. Patient was recently admitted to [facility] from 9/15-9/16 due to fevers coupled with a 6 mm left distal ureteral stone. She underwent cystoscopy with left ureteral stent placement on 9/15. She had an uneventful hospitalization and was discharged home on 7 days of Augmentin. Both her clean catch and intra-op urine cultures have shown no growth.     She states that beginning yesterday, she has had worsened left flank pain coupled with dizziness and palpitations. In addition, she has had frequency, urgency, gross hematuria with a few small clots, suprapubic pain, and sensation of incomplete emptying. No fevers, chills, nausea, or vomiting. Her ileostomy output is at her baseline. She has had a poor appetite but states she is drinking adequate fluids. She has been taking all of her discharge medications as prescribed.  In the ED, she was initially tachycardic (responded to 1 L crystalloid bolus) but normotensive. Her WBC and Cr are both normal. Clean catch UA is nitrite negative and shows >100 RBC/hpf, 2 WBC/hpf, 2 squams, and no bacteria. KUB demonstrated her left ureteral stent to be in good position. PVR via bladder scan performed by myself was 14 mL. She has received ketorolac, dilaudid, and phenergan. Her pain has improved.    She was previously on Keflex prophylaxis for recurrent UTI's which she has since discontinued. For Crohn's, she has been off Entyvio since recent surgery and UTI's. She has passed stones previously never requiring a stone procedure. Her additional surgical history includes completion proctocolectomy with end ileostomy for Crohn's in 2012, SHELLIE/RSO in 2015 (complicated by cystotomy, repaired with chromic), bilateral salpingo-oophroectomy in 2019 (for bilateral ovarian cysts, benign pathology, post op  course complicated by pelvic abscess), and laparoscopic lysis of adhesions in 2020 for pelvic pain.   She had a colovesical fistula at age 18 treated with surgery and a prolonged Ureña.

## 2020-09-18 NOTE — ED PROVIDER NOTES
Encounter Date: 9/17/2020       History     Chief Complaint   Patient presents with    Post-op Problem     Reports hematuria and L flank pain; taking norco with minimal relie. L cystoscopy on 9/15    Tachycardia     . Reporting palpitations starting in triage.      Patient is a 40 year old female with PMHX of crohn's s/p colectomy, HTN, anemia, and hx of melanoma. She presents to the ED for post op problem. Patient recently underwent cystoscopy with stent placement for left ureteral stent on 09/15/2020 by Dr. Florentino. She reports having generalized weakness since today. Reports associated nausea, palpitations, left flank pain, dysuria, and hematuria. Patient reports last dose of percocet today at 6PM. Denies chronic steroid or immunosuppressant medication use for crohn's since colectomy. Denies hx of anticoagulation. Reports hx of blood transfusions. Denies pain with deep inspiration. Denies active chemo or radiation. She denies fever,chills, vomiting, sob, chest pain, abd pain, diarrhea, or constipation. She is a non smoker and denies alcohol use.    The history is provided by the patient and medical records. No  was used.     Review of patient's allergies indicates:   Allergen Reactions    Azathioprine sodium Other (See Comments)     Other reaction(s): pancreatitis  Other reaction(s): pancreatitis    Methotrexate analogues Other (See Comments)     leukopenia    Stelara [ustekinumab] Other (See Comments)     Multiple infections    Zofran [ondansetron hcl (pf)] Other (See Comments)     Per patient causes prolong QT    Vancomycin analogues Hives    Morphine Itching and Other (See Comments)     Other reaction(s): Itching    Bactrim [sulfamethoxazole-trimethoprim] Palpitations    Ciprofloxacin Palpitations     Past Medical History:   Diagnosis Date    Abnormal Pap smear 2007    Abnormal Pap smear 5/26/2011    Anemia     Anxiety     Arthritis     C. difficile diarrhea      Crohn's disease     Depression 8/5/2017    Encounter for blood transfusion     Genital HSV     History of colposcopy with cervical biopsy 2007 and 7/2011 2007-LYLA I  and 7/2011- LYLA I    Hypertension     Kidney stone     Kidney stone     Melanoma     Recurrent UTI 4/3/2013    S/P ileostomy 7/9/2012    Sterilization 6/23/2012     Past Surgical History:   Procedure Laterality Date    ABDOMINAL SURGERY      APPENDECTOMY      BILATERAL SALPINGO-OOPHORECTOMY (BSO) Bilateral 5/30/2019    Procedure: SALPINGO-OOPHORECTOMY, BILATERAL;  Surgeon: Rupa German MD;  Location: [facility] OR [address] [place] [address];  Service: OB/GYN;  Laterality: Bilateral;    BLADDER SURGERY      partial cystectomy due to fistula    CKC      COLON SURGERY      COLONOSCOPY      CYSTOSCOPY W/ URETERAL STENT PLACEMENT Left 9/15/2020    Procedure: CYSTOSCOPY, WITH URETERAL STENT INSERTION;  Surgeon: Sascha Florentino MD;  Location: [facility] OR [address] [place] [address];  Service: Urology;  Laterality: Left;    DIAGNOSTIC LAPAROSCOPY N/A 7/9/2020    Procedure: LAPAROSCOPY, DIAGNOSTIC;  Surgeon: Gilson El MD;  Location: [facility];  Service: OB/GYN;  Laterality: N/A;    EXCISION OF MELANOMA  07/17/2019    ILEOSTOMY      LAPAROSCOPIC LYSIS OF ADHESIONS N/A 7/9/2020    Procedure: LYSIS, ADHESIONS, LAPAROSCOPIC;  Surgeon: Gilson El MD;  Location: [facility];  Service: OB/GYN;  Laterality: N/A;    LYSIS OF ADHESIONS N/A 5/30/2019    Procedure: LYSIS, ADHESIONS;  Surgeon: Rupa German MD;  Location: [facility] OR [address] [place] [address];  Service: OB/GYN;  Laterality: N/A;    OOPHORECTOMY Right 04/16/2015    PORTACATH PLACEMENT  02/21/2017    SKIN BIOPSY      SMALL INTESTINE SURGERY      age 18 Y    TOTAL ABDOMINAL HYSTERECTOMY  04/16/2015    TOTAL COLECTOMY      TUBAL LIGATION  06/06/2012    UPPER GASTROINTESTINAL ENDOSCOPY       Family History   Problem Relation Age of Onset    Colon cancer Father     Cancer Father         colon cancer    Liver cancer Father      Hypertension Mother     Cancer Maternal Grandfather         esophageal      Skin cancer Maternal Grandfather     Endometrial cancer Maternal Aunt     Crohn's disease Brother     Breast cancer Neg Hx     Ovarian cancer Neg Hx     Melanoma Neg Hx      Social History     Tobacco Use    Smoking status: Never Smoker    Smokeless tobacco: Never Used   Substance Use Topics    Alcohol use: Not Currently     Alcohol/week: 0.0 standard drinks    Drug use: No     Review of Systems   Constitutional: Negative for fever.   HENT: Negative for sore throat.    Respiratory: Negative for shortness of breath.    Cardiovascular: Positive for palpitations. Negative for chest pain.   Gastrointestinal: Positive for nausea. Negative for abdominal pain and vomiting.   Genitourinary: Positive for dysuria, flank pain and hematuria.   Musculoskeletal: Negative for back pain.   Skin: Negative for rash.   Neurological: Positive for weakness.   Hematological: Does not bruise/bleed easily.       Physical Exam     Initial Vitals [09/17/20 1901]   BP Pulse Resp Temp SpO2   (!) 154/125 (!) 142 20 98.3 °F (36.8 °C) 97 %      MAP       --         Physical Exam    Vitals reviewed.  Constitutional: She appears well-developed and well-nourished. No distress.   HENT:   Head: Normocephalic.   Eyes: Conjunctivae are normal.   Neck: Normal range of motion.   Cardiovascular: Regular rhythm. Tachycardia present.    No murmur heard.There is a central line which is a Mykel-Cath in the left subclavian.   Pulmonary/Chest: Breath sounds normal. No respiratory distress. She has no wheezes. She has no rales.   Abdominal: Soft. Bowel sounds are normal. She exhibits no distension. There is no abdominal tenderness. There is CVA tenderness (mild left ).   Colostomy noted to RLQ intact.    Genitourinary:    Genitourinary Comments: Maroon fruit punch colored urine at bedside.     Musculoskeletal: Normal range of motion. No edema.   Neurological: She is alert  "and oriented to person, place, and time.   Skin: Skin is warm and dry.         ED Course   Procedures  Labs Reviewed   COMPREHENSIVE METABOLIC PANEL - Abnormal; Notable for the following components:       Result Value    Potassium 2.9 (*)     Calcium 8.5 (*)     All other components within normal limits   URINALYSIS, REFLEX TO URINE CULTURE - Abnormal; Notable for the following components:    Color, UA Red (*)     Appearance, UA Cloudy (*)     Protein, UA 2+ (*)     Glucose, UA 1+ (*)     Ketones, UA Trace (*)     Occult Blood UA 2+ (*)     All other components within normal limits    Narrative:     Specimen Source->Urine   URINALYSIS MICROSCOPIC - Abnormal; Notable for the following components:    RBC, UA >100 (*)     All other components within normal limits    Narrative:     Specimen Source->Urine   CULTURE, URINE   CULTURE, URINE   CBC W/ AUTO DIFFERENTIAL   MAGNESIUM   TSH        ECG Results          EKG 12-lead (In process)  Result time 09/18/20 07:37:49    In process by Interface, Lab In [facility] (09/18/20 07:37:49)                 Narrative:    Test Reason : R00.0,    Vent. Rate : 137 BPM     Atrial Rate : 137 BPM     P-R Int : 112 ms          QRS Dur : 068 ms      QT Int : 298 ms       P-R-T Axes : 084 048 064 degrees     QTc Int : 449 ms    Sinus tachycardia  Biatrial enlargement  Abnormal ECG  When compared with ECG of 15-SEP-2020 15:19,  No significant change was found    Referred By: [email or web address]   SELF           Confirmed By:                             Imaging Results          X-Ray Chest AP Portable (Final result)  Result time 09/17/20 20:36:56    Final result by Gerardo Chambers MD (09/17/20 20:36:56)                 Impression:      No acute cardiopulmonary finding identified on this single view.      Electronically signed by: Gerardo Chambers MD  Date:    09/17/2020  Time:    20:36             Narrative:    EXAMINATION:  XR CHEST AP PORTABLE    CLINICAL HISTORY:  Provided history is "tachycardia;  " "".    TECHNIQUE:  One view of the chest.    COMPARISON:  09/15/2020.    FINDINGS:  Cardiac wires overlie the chest.  Cardiomediastinal silhouette is not enlarged.  Left-sided Port-A-Cath overlies the SVC.  No focal consolidation.  No sizable pleural effusion.  No pneumothorax.  Remote left clavicle injury is present.                               X-Ray Abdomen AP 1 View (KUB) (Final result)  Result time 09/17/20 20:33:34    Final result by Gerardo Chambers MD (09/17/20 20:33:34)                 Impression:      Unremarkable appearance of left-sided nephroureteral stent.      Electronically signed by: Gerardo Chambers MD  Date:    09/17/2020  Time:    20:33             Narrative:    EXAMINATION:  XR ABDOMEN AP 1 VIEW    CLINICAL HISTORY:  Presence of urogenital implants.    TECHNIQUE:  Single AP View of the abdomen was performed.    COMPARISON:  02/21/2019.    FINDINGS:  Cardiac wires overlie the abdomen.  Left nephroureteral stent is present in expected position.  Bowel gas pattern is unremarkable.  Right lower quadrant ostomy is noted.  Calcifications overlie the right gluteal region.  Bones demonstrate no acute abnormality.                                 Medical Decision Making:   History:   Old Medical Records: I decided to obtain old medical records.  Old Records Summarized: records from previous admission(s).  Independently Interpreted Test(s):   I have ordered and independently interpreted X-rays - see summary below.  Clinical Tests:   Lab Tests: Ordered and Reviewed  Radiological Study: Ordered and Reviewed  Medical Tests: Ordered and Reviewed  Other:   I have discussed this case with another health care provider.       <> Summary of the Discussion: Case discussed with urology for evaluation.        APC / Resident Notes:   Patient is a 40 year old female presents to the ED for emergent evaluation of post op problem.     Will order labs and imaging. Will order IVF, antiemetic, and pain medication for " symptomatic relief. Will continue to monitor.     Differential diagnoses include, but are not limited to: renal colic, symptomatic anemia, thyroid dysfunction, cardiac arrhythmia, or electrolyte imbalance.     No leukocytosis. Hemodynamically stable. Hypokalemia 2.9. will replete PO while in ED. UA unremarkable for infectious process; however, found to have gross hematuria. CXR found to have No acute cardiopulmonary process. Xray KUB found to have Unremarkable appearance of left-sided nephroureteral stent.     9:58 PM  Case discussed with urology, Dr. Gavino Newman, for evaluation. Will continue to monitor.     11:15 PM  Urology at bedside evaluating patient. Appreciate consult. They believe patient is stable for discharge as there are no current urologic interventions indicated at this time. They recommend discharging home with more PO narcotic pain medications. Patient should continue flomax, oxybutynin, and Augmentin and follow-up for scheduled ureteroscopy on 9/23/20 for definitive stone treatment.     Patient reassessed, reports symptoms improved with ED management. I have discussed emergency department findings, and plan with the patient. Will discharge home with pain medication and F/U with urology as scheduled in approximately one week. Additional verbal discharge instructions were given and discussed with the patient. Patient verbalizes understanding of plan and agrees. Return precautions given.    I have discussed and reviewed with my supervising physician.         Attending Attestation:     Physician Attestation Statement for NP/PA:   I have conducted a face to face encounter with this patient in addition to the NP/PA, due to Medical Complexity    Other NP/PA Attestation Additions:    History of Present Illness: 40-year-old woman with a history of Crohn's disease status post colectomy presents complaining of left flank pain with hematuria.  She recently had a stent placed for kidney stone and states that  since the procedure she has continued to have pain and gross hematuria.  She now has severe lightheadedness with standing.  No fevers.   Physical Exam: Patient appears mildly uncomfortable.  She is tachycardic.  Abdomen is soft with mild left upper quadrant tenderness.   Medical Decision Making: EKG shows sinus tachycardia at 137 he with atrial enlargement.  No ST elevation.    Patient is significantly tachycardic on presentation.  She was hydrated with IV fluids, given antiemetics and pain was treated.  Urology was consulted and at the end of my shift we were awaiting their evaluation of the patient.           Clinical Impression:       ICD-10-CM ICD-9-CM   1. Left flank pain  R10.9 789.09   2. Status post placement of ureteral stent  Z96.0 V45.89   3. Hypokalemia  E87.6 276.8                      Disposition:   Disposition: Discharged  Condition: Stable     ED Disposition Condition    Discharge Stable        ED Prescriptions     Medication Sig Dispense Start Date End Date Auth. Provider    oxyCODONE-acetaminophen (PERCOCET) 7.5-325 mg per tablet Take 1 tablet by mouth every 6 (six) hours as needed for Pain. 12 tablet 9/18/2020 9/18/2021 Vero Vila PA-C        Follow-up Information     Follow up With Specialties Details Why Contact Info    Sascha Florentino MD Urology Go on 9/23/2020  [address] [facility] [address]  [phone]                                         Vero Vila PA-C  09/18/20 0834       Lana Bermudez MD  09/19/20 124

## 2020-09-18 NOTE — ED TRIAGE NOTES
Patient c/o left flank pain, hematuria, dysuria, nausea, palpitations since this morning. Had left cystoscopy with left uretal stent placed on 9/15. Last took percocet at 6pm with minimal relief. Patient is tachycardic in 130s.

## 2020-09-18 NOTE — ASSESSMENT & PLAN NOTE
Ivy Salgado is a 40 y.o. female with a history of Crohn's disease, colovesical fistula as a child, recurrent UTIs, and nephrolithiasis who presents to the [facility] ED on 9/17/20 due to uncontrolled pain. Patient's symptoms are consistent with stent colic.     - No current urologic intervention indicated at this time.  - Recommend discharging home with more PO narcotic pain medications. Patient should continue flomax, oxybutynin, and augmentin.  - Urine culture ordered as an add-on.  - Follow-up for scheduled ureteroscopy on 9/23/20 for definitive stone treatment.

## 2020-09-19 LAB — BACTERIA UR CULT: NO GROWTH

## 2020-09-20 ENCOUNTER — LAB VISIT (OUTPATIENT)
Dept: URGENT CARE | Facility: CLINIC | Age: 40
End: 2020-09-20
Payer: MEDICARE

## 2020-09-20 VITALS — TEMPERATURE: 98 F

## 2020-09-20 DIAGNOSIS — N39.0 RECURRENT UTI: ICD-10-CM

## 2020-09-20 LAB
BACTERIA BLD CULT: NORMAL
BACTERIA BLD CULT: NORMAL

## 2020-09-20 PROCEDURE — U0003 INFECTIOUS AGENT DETECTION BY NUCLEIC ACID (DNA OR RNA); SEVERE ACUTE RESPIRATORY SYNDROME CORONAVIRUS 2 (SARS-COV-2) (CORONAVIRUS DISEASE [COVID-19]), AMPLIFIED PROBE TECHNIQUE, MAKING USE OF HIGH THROUGHPUT TECHNOLOGIES AS DESCRIBED BY CMS-2020-01-R: HCPCS

## 2020-09-21 ENCOUNTER — PATIENT MESSAGE (OUTPATIENT)
Dept: INTERNAL MEDICINE | Facility: CLINIC | Age: 40
End: 2020-09-21

## 2020-09-21 LAB — SARS-COV-2 RNA RESP QL NAA+PROBE: NOT DETECTED

## 2020-09-22 ENCOUNTER — PATIENT MESSAGE (OUTPATIENT)
Dept: UROLOGY | Facility: CLINIC | Age: 40
End: 2020-09-22

## 2020-09-22 ENCOUNTER — TELEPHONE (OUTPATIENT)
Dept: UROLOGY | Facility: CLINIC | Age: 40
End: 2020-09-22

## 2020-09-22 NOTE — TELEPHONE ENCOUNTER
Called pt to confirm arrival time of 800am for procedure on 9-23. Gave pt NPO instructions and gave pt opportunity to ask questions. Pt verbalized understanding.

## 2020-09-23 ENCOUNTER — HOSPITAL ENCOUNTER (OUTPATIENT)
Facility: HOSPITAL | Age: 40
Discharge: HOME | End: 2020-09-23
Attending: UROLOGY | Admitting: UROLOGY
Payer: MEDICARE

## 2020-09-23 ENCOUNTER — ANESTHESIA EVENT (OUTPATIENT)
Dept: SURGERY | Facility: HOSPITAL | Age: 40
End: 2020-09-23
Payer: MEDICARE

## 2020-09-23 ENCOUNTER — ANESTHESIA (OUTPATIENT)
Dept: SURGERY | Facility: HOSPITAL | Age: 40
End: 2020-09-23
Payer: MEDICARE

## 2020-09-23 ENCOUNTER — PATIENT MESSAGE (OUTPATIENT)
Dept: SURGERY | Facility: HOSPITAL | Age: 40
End: 2020-09-23

## 2020-09-23 VITALS
HEIGHT: 61 IN | SYSTOLIC BLOOD PRESSURE: 100 MMHG | RESPIRATION RATE: 18 BRPM | BODY MASS INDEX: 23.6 KG/M2 | WEIGHT: 125 LBS | OXYGEN SATURATION: 97 % | HEART RATE: 83 BPM | TEMPERATURE: 98 F | DIASTOLIC BLOOD PRESSURE: 68 MMHG

## 2020-09-23 DIAGNOSIS — N20.1 URETERAL STONE: Primary | ICD-10-CM

## 2020-09-23 PROCEDURE — 63600175 PHARM REV CODE 636 W HCPCS: Performed by: ANESTHESIOLOGY

## 2020-09-23 PROCEDURE — D9220A PRA ANESTHESIA: ICD-10-PCS | Mod: ANES,,, | Performed by: ANESTHESIOLOGY

## 2020-09-23 PROCEDURE — C1769 GUIDE WIRE: HCPCS | Performed by: UROLOGY

## 2020-09-23 PROCEDURE — 37000009 HC ANESTHESIA EA ADD 15 MINS: Performed by: UROLOGY

## 2020-09-23 PROCEDURE — D9220A PRA ANESTHESIA: Mod: ANES,,, | Performed by: ANESTHESIOLOGY

## 2020-09-23 PROCEDURE — 25000003 PHARM REV CODE 250: Performed by: STUDENT IN AN ORGANIZED HEALTH CARE EDUCATION/TRAINING PROGRAM

## 2020-09-23 PROCEDURE — 25500020 PHARM REV CODE 255: Performed by: UROLOGY

## 2020-09-23 PROCEDURE — D9220A PRA ANESTHESIA: ICD-10-PCS | Mod: CRNA,,, | Performed by: NURSE ANESTHETIST, CERTIFIED REGISTERED

## 2020-09-23 PROCEDURE — 71000016 HC POSTOP RECOV ADDL HR: Performed by: UROLOGY

## 2020-09-23 PROCEDURE — 36000706: Performed by: UROLOGY

## 2020-09-23 PROCEDURE — 52356 CYSTO/URETERO W/LITHOTRIPSY: CPT | Mod: LT,,, | Performed by: UROLOGY

## 2020-09-23 PROCEDURE — 52356 PR CYSTO/URETERO W/LITHOTRIPSY: ICD-10-PCS | Mod: LT,,, | Performed by: UROLOGY

## 2020-09-23 PROCEDURE — 71000015 HC POSTOP RECOV 1ST HR: Performed by: UROLOGY

## 2020-09-23 PROCEDURE — 63600175 PHARM REV CODE 636 W HCPCS: Performed by: STUDENT IN AN ORGANIZED HEALTH CARE EDUCATION/TRAINING PROGRAM

## 2020-09-23 PROCEDURE — 82365 CALCULUS SPECTROSCOPY: CPT

## 2020-09-23 PROCEDURE — 63600175 PHARM REV CODE 636 W HCPCS

## 2020-09-23 PROCEDURE — D9220A PRA ANESTHESIA: Mod: CRNA,,, | Performed by: NURSE ANESTHETIST, CERTIFIED REGISTERED

## 2020-09-23 PROCEDURE — 63600175 PHARM REV CODE 636 W HCPCS: Performed by: NURSE ANESTHETIST, CERTIFIED REGISTERED

## 2020-09-23 PROCEDURE — 27201423 OPTIME MED/SURG SUP & DEVICES STERILE SUPPLY: Performed by: UROLOGY

## 2020-09-23 PROCEDURE — 36000707: Performed by: UROLOGY

## 2020-09-23 PROCEDURE — 25000003 PHARM REV CODE 250: Performed by: NURSE ANESTHETIST, CERTIFIED REGISTERED

## 2020-09-23 PROCEDURE — 37000008 HC ANESTHESIA 1ST 15 MINUTES: Performed by: UROLOGY

## 2020-09-23 PROCEDURE — 71000044 HC DOSC ROUTINE RECOVERY FIRST HOUR: Performed by: UROLOGY

## 2020-09-23 RX ORDER — FENTANYL CITRATE 50 UG/ML
25 INJECTION, SOLUTION INTRAMUSCULAR; INTRAVENOUS EVERY 5 MIN PRN
Status: COMPLETED | OUTPATIENT
Start: 2020-09-23 | End: 2020-09-23

## 2020-09-23 RX ORDER — MIDAZOLAM HYDROCHLORIDE 1 MG/ML
INJECTION, SOLUTION INTRAMUSCULAR; INTRAVENOUS
Status: DISCONTINUED | OUTPATIENT
Start: 2020-09-23 | End: 2020-09-23

## 2020-09-23 RX ORDER — SODIUM CHLORIDE 9 MG/ML
INJECTION, SOLUTION INTRAVENOUS CONTINUOUS
Status: DISCONTINUED | OUTPATIENT
Start: 2020-09-23 | End: 2020-09-23 | Stop reason: HOSPADM

## 2020-09-23 RX ORDER — OXYCODONE AND ACETAMINOPHEN 5; 325 MG/1; MG/1
1 TABLET ORAL EVERY 6 HOURS PRN
Qty: 10 TABLET | Refills: 0 | Status: SHIPPED | OUTPATIENT
Start: 2020-09-23 | End: 2020-09-25

## 2020-09-23 RX ORDER — LIDOCAINE HYDROCHLORIDE 20 MG/ML
INJECTION INTRAVENOUS
Status: DISCONTINUED | OUTPATIENT
Start: 2020-09-23 | End: 2020-09-23

## 2020-09-23 RX ORDER — CEFAZOLIN SODIUM 1 G/3ML
2 INJECTION, POWDER, FOR SOLUTION INTRAMUSCULAR; INTRAVENOUS
Status: COMPLETED | OUTPATIENT
Start: 2020-09-23 | End: 2020-09-23

## 2020-09-23 RX ORDER — DIPHENHYDRAMINE HYDROCHLORIDE 50 MG/ML
25 INJECTION INTRAMUSCULAR; INTRAVENOUS ONCE
Status: COMPLETED | OUTPATIENT
Start: 2020-09-23 | End: 2020-09-23

## 2020-09-23 RX ORDER — FENTANYL CITRATE 50 UG/ML
INJECTION, SOLUTION INTRAMUSCULAR; INTRAVENOUS
Status: DISCONTINUED | OUTPATIENT
Start: 2020-09-23 | End: 2020-09-23

## 2020-09-23 RX ORDER — HALOPERIDOL 5 MG/ML
0.5 INJECTION INTRAMUSCULAR
Status: DISCONTINUED | OUTPATIENT
Start: 2020-09-23 | End: 2020-09-23 | Stop reason: HOSPADM

## 2020-09-23 RX ORDER — PROPOFOL 10 MG/ML
VIAL (ML) INTRAVENOUS
Status: DISCONTINUED | OUTPATIENT
Start: 2020-09-23 | End: 2020-09-23

## 2020-09-23 RX ORDER — SODIUM CHLORIDE 0.9 % (FLUSH) 0.9 %
10 SYRINGE (ML) INJECTION
Status: DISCONTINUED | OUTPATIENT
Start: 2020-09-23 | End: 2020-09-23 | Stop reason: HOSPADM

## 2020-09-23 RX ORDER — ROCURONIUM BROMIDE 10 MG/ML
INJECTION, SOLUTION INTRAVENOUS
Status: DISCONTINUED | OUTPATIENT
Start: 2020-09-23 | End: 2020-09-23

## 2020-09-23 RX ORDER — HYDROMORPHONE HYDROCHLORIDE 1 MG/ML
0.2 INJECTION, SOLUTION INTRAMUSCULAR; INTRAVENOUS; SUBCUTANEOUS EVERY 5 MIN PRN
Status: COMPLETED | OUTPATIENT
Start: 2020-09-23 | End: 2020-09-23

## 2020-09-23 RX ORDER — DEXAMETHASONE SODIUM PHOSPHATE 4 MG/ML
INJECTION, SOLUTION INTRA-ARTICULAR; INTRALESIONAL; INTRAMUSCULAR; INTRAVENOUS; SOFT TISSUE
Status: DISCONTINUED | OUTPATIENT
Start: 2020-09-23 | End: 2020-09-23

## 2020-09-23 RX ORDER — OXYCODONE HYDROCHLORIDE 5 MG/1
10 TABLET ORAL EVERY 4 HOURS PRN
Status: DISCONTINUED | OUTPATIENT
Start: 2020-09-23 | End: 2020-09-23 | Stop reason: HOSPADM

## 2020-09-23 RX ORDER — OXYCODONE HYDROCHLORIDE 5 MG/1
5 TABLET ORAL EVERY 4 HOURS PRN
Status: DISCONTINUED | OUTPATIENT
Start: 2020-09-23 | End: 2020-09-23 | Stop reason: HOSPADM

## 2020-09-23 RX ORDER — PHENYLEPHRINE HYDROCHLORIDE 10 MG/ML
INJECTION INTRAVENOUS
Status: DISCONTINUED | OUTPATIENT
Start: 2020-09-23 | End: 2020-09-23

## 2020-09-23 RX ORDER — EPHEDRINE SULFATE 50 MG/ML
INJECTION, SOLUTION INTRAVENOUS
Status: DISCONTINUED | OUTPATIENT
Start: 2020-09-23 | End: 2020-09-23

## 2020-09-23 RX ORDER — HEPARIN 100 UNIT/ML
SYRINGE INTRAVENOUS
Status: COMPLETED
Start: 2020-09-23 | End: 2020-09-23

## 2020-09-23 RX ADMIN — DEXAMETHASONE SODIUM PHOSPHATE 8 MG: 4 INJECTION, SOLUTION INTRAMUSCULAR; INTRAVENOUS at 10:09

## 2020-09-23 RX ADMIN — FENTANYL CITRATE 25 MCG: 50 INJECTION, SOLUTION INTRAMUSCULAR; INTRAVENOUS at 11:09

## 2020-09-23 RX ADMIN — HEPARIN 500 UNITS: 100 SYRINGE at 02:09

## 2020-09-23 RX ADMIN — HYDROMORPHONE HYDROCHLORIDE 0.2 MG: 1 INJECTION, SOLUTION INTRAMUSCULAR; INTRAVENOUS; SUBCUTANEOUS at 12:09

## 2020-09-23 RX ADMIN — HALOPERIDOL LACTATE 0.5 MG: 5 INJECTION, SOLUTION INTRAMUSCULAR at 12:09

## 2020-09-23 RX ADMIN — HYDROMORPHONE HYDROCHLORIDE 0.2 MG: 1 INJECTION, SOLUTION INTRAMUSCULAR; INTRAVENOUS; SUBCUTANEOUS at 01:09

## 2020-09-23 RX ADMIN — SUGAMMADEX 200 MG: 100 INJECTION, SOLUTION INTRAVENOUS at 10:09

## 2020-09-23 RX ADMIN — EPHEDRINE SULFATE 5 MG: 50 INJECTION INTRAVENOUS at 10:09

## 2020-09-23 RX ADMIN — ROCURONIUM BROMIDE 40 MG: 10 INJECTION, SOLUTION INTRAVENOUS at 09:09

## 2020-09-23 RX ADMIN — PHENYLEPHRINE HYDROCHLORIDE 100 MCG: 10 INJECTION INTRAVENOUS at 10:09

## 2020-09-23 RX ADMIN — PROPOFOL 180 MG: 10 INJECTION, EMULSION INTRAVENOUS at 09:09

## 2020-09-23 RX ADMIN — LIDOCAINE HYDROCHLORIDE 60 MG: 20 INJECTION, SOLUTION INTRAVENOUS at 09:09

## 2020-09-23 RX ADMIN — FENTANYL CITRATE 100 MCG: 50 INJECTION, SOLUTION INTRAMUSCULAR; INTRAVENOUS at 09:09

## 2020-09-23 RX ADMIN — SODIUM CHLORIDE: 0.9 INJECTION, SOLUTION INTRAVENOUS at 09:09

## 2020-09-23 RX ADMIN — MIDAZOLAM HYDROCHLORIDE 2 MG: 1 INJECTION, SOLUTION INTRAMUSCULAR; INTRAVENOUS at 09:09

## 2020-09-23 RX ADMIN — OXYCODONE HYDROCHLORIDE 10 MG: 5 TABLET ORAL at 11:09

## 2020-09-23 RX ADMIN — CEFAZOLIN 2 G: 330 INJECTION, POWDER, FOR SOLUTION INTRAMUSCULAR; INTRAVENOUS at 09:09

## 2020-09-23 RX ADMIN — DIPHENHYDRAMINE HYDROCHLORIDE 25 MG: 50 INJECTION, SOLUTION INTRAMUSCULAR; INTRAVENOUS at 12:09

## 2020-09-23 NOTE — ANESTHESIA PREPROCEDURE EVALUATION
[facility]  Anesthesia Pre-Operative Evaluation         Patient Name: Ivy Salgado  YOB: 1980  MRN: [id number]    SUBJECTIVE:          09/23/2020    Ivy Salgado is a 40 y.o. female w/ a significant PMHx of w/ a significant PMHx of HTN, Crohn's disease (s/p colectomy, proctectomy), nephrolithiasis, recurrent UTIs, s/p SHELLIE-BSO, who presents with a Left ureteral stone.    Patient now presents for the above procedure(s).    TTE 3/12/20  · Normal left ventricular systolic function. The estimated ejection fraction is 63%.  · No wall motion abnormalities.  · Normal LV diastolic function.  · Normal right ventricular systolic function.  · Normal central venous pressure (3 mmHg).    EKG 9/15/20  Sinus tachycardia   Otherwise normal ECG   When compared with ECG of 11-MAR-2020 10:24,   No significant change was found     LDA: None documented.    Prev airway: DL, Sarmiento 2, Grade I, ETT 7.0    Drips: None documented.    Patient Active Problem List   Diagnosis    Pelvic mass    S/P ileostomy    Crohn's disease of small intestine    Generalized anxiety disorder    Herpes genitalis in women    Ovarian cyst    Adnexal adhesions    Adhesions of uterus    Cyst of right ovary    Joint pain    Fatigue    Abdominal pain    Crohn's disease of small intestine with complication    Current mild episode of major depressive disorder without prior episode    Granulomatous colitis    Appetite impaired    Atrial tachycardia    Recurrent UTI    Vitamin D deficiency    Palpitations    Sinus tachycardia    Multiple thyroid nodules    Urinary tract infection with hematuria    Osteopenia of multiple sites    Diarrhea    Transient neurological symptoms    Chronic, continuous use of opioids    Acid reflux    SOBOE (shortness of breath on exertion)    Poor venous access    Alkaline phosphatase elevation- based on lab from 4/9/2019     S/P ExLap, BSO, 5/30/19    Premature surgical  menopause    Complex partial epilepsy with generalization and with intractable epilepsy    History of recurrent UTI (urinary tract infection)    Seizures    Pelvic pain in female    Left ureteral stone    Ureteral stone    Pain due to ureteral stent       Review of patient's allergies indicates:   Allergen Reactions    Azathioprine sodium Other (See Comments)     Other reaction(s): pancreatitis  Other reaction(s): pancreatitis    Methotrexate analogues Other (See Comments)     leukopenia    Stelara [ustekinumab] Other (See Comments)     Multiple infections    Zofran [ondansetron hcl (pf)] Other (See Comments)     Per patient causes prolong QT    Vancomycin analogues Hives    Morphine Itching and Other (See Comments)     Other reaction(s): Itching    Bactrim [sulfamethoxazole-trimethoprim] Palpitations    Ciprofloxacin Palpitations       Current Outpatient Medications:  No current facility-administered medications for this visit.   No current outpatient medications on file.    Facility-Administered Medications Ordered in Other Visits:     0.9%  NaCl infusion, , Intravenous, Continuous, Aramis Ennis MD    ceFAZolin injection 2 g, 2 g, Intravenous, On Call Procedure, Aramis Ennis MD    iohexoL (OMNIPAQUE 300) injection, , , PRN, Sascha Florentino MD, 100 mL at 09/23/20 0932    Past Surgical History:   Procedure Laterality Date    ABDOMINAL SURGERY      APPENDECTOMY      BILATERAL SALPINGO-OOPHORECTOMY (BSO) Bilateral 5/30/2019    Procedure: SALPINGO-OOPHORECTOMY, BILATERAL;  Surgeon: Rupa German MD;  Location: [facility] OR [address] [place] [address];  Service: OB/GYN;  Laterality: Bilateral;    BLADDER SURGERY      partial cystectomy due to fistula    CKC      COLON SURGERY      COLONOSCOPY      CYSTOSCOPY W/ URETERAL STENT PLACEMENT Left 9/15/2020    Procedure: CYSTOSCOPY, WITH URETERAL STENT INSERTION;  Surgeon: Sascha Florentino MD;  [facility];  Service: Urology;  Laterality: Left;     DIAGNOSTIC LAPAROSCOPY N/A 7/9/2020    Procedure: LAPAROSCOPY, DIAGNOSTIC;  Surgeon: Gilson El MD;  Location: [facility] OR;  Service: OB/GYN;  Laterality: N/A;    EXCISION OF MELANOMA  07/17/2019    ILEOSTOMY      LAPAROSCOPIC LYSIS OF ADHESIONS N/A 7/9/2020    Procedure: LYSIS, ADHESIONS, LAPAROSCOPIC;  Surgeon: Gilson El MD;  Location: [facility] OR;  Service: OB/GYN;  Laterality: N/A;    LYSIS OF ADHESIONS N/A 5/30/2019    Procedure: LYSIS, ADHESIONS;  Surgeon: Rupa German MD;  Location: [facility] OR [address] [place] [address];  Service: OB/GYN;  Laterality: N/A;    OOPHORECTOMY Right 04/16/2015    PORTACATH PLACEMENT  02/21/2017    SKIN BIOPSY      SMALL INTESTINE SURGERY      age 18 Y    TOTAL ABDOMINAL HYSTERECTOMY  04/16/2015    TOTAL COLECTOMY      TUBAL LIGATION  06/06/2012    UPPER GASTROINTESTINAL ENDOSCOPY         Social History     Socioeconomic History    Marital status: Single     Spouse name: Not on file    Number of children: Not on file    Years of education: Not on file    Highest education level: Not on file   Occupational History     Employer: [employer]   Social Needs    Financial resource strain: Not on file    Food insecurity     Worry: Not on file     Inability: Not on file    Transportation needs     Medical: Not on file     Non-medical: Not on file   Tobacco Use    Smoking status: Never Smoker    Smokeless tobacco: Never Used   Substance and Sexual Activity    Alcohol use: Not Currently     Alcohol/week: 0.0 standard drinks    Drug use: No    Sexual activity: Yes     Partners: Male     Birth control/protection: Surgical     Comment: HYST   Lifestyle    Physical activity     Days per week: Not on file     Minutes per session: Not on file    Stress: Not on file   Relationships    Social connections     Talks on phone: Not on file     Gets together: Not on file     Attends Mu-ism service: Not on file     Active member of club or organization: Not on file      Attends meetings of clubs or organizations: Not on file     Relationship status: Not on file   Other Topics Concern    Are you pregnant or think you may be? No    Breast-feeding No   Social History Narrative    Not on file       OBJECTIVE:     Vital Signs Range (Last 24H):  Temp:  [36.9 °C (98.4 °F)]   Pulse:  [80]   Resp:  [18]   BP: (115)/(78)   SpO2:  [99 %]       Significant Labs:  Lab Results   Component Value Date    WBC 6.54 09/17/2020    HGB 12.3 09/17/2020    HCT 38.1 09/17/2020     09/17/2020    CHOL 163 01/28/2020    TRIG 132 01/28/2020    HDL 40 01/28/2020    ALT 15 09/17/2020    AST 16 09/17/2020     09/17/2020    K 2.9 (L) 09/17/2020     09/17/2020    CREATININE 0.8 09/17/2020    BUN 10 09/17/2020    CO2 23 09/17/2020    TSH 2.294 09/17/2020    INR 1.1 02/02/2018    HGBA1C 4.8 09/18/2018       Diagnostic Studies: No relevant studies.    EKG:   Results for orders placed or performed during the hospital encounter of 09/17/20   EKG 12-lead    Collection Time: 09/17/20  7:05 PM    Narrative    Test Reason : R00.0,    Vent. Rate : 137 BPM     Atrial Rate : 137 BPM     P-R Int : 112 ms          QRS Dur : 068 ms      QT Int : 298 ms       P-R-T Axes : 084 048 064 degrees     QTc Int : 449 ms    Sinus tachycardia  Biatrial enlargement  Nonspecific ST-t wave abnormality  Abnormal ECG  When compared with ECG of 15-SEP-2020 15:19,  Nonspecific ST-t wave changes  Confirmed by NORM ALLISON MD ([id number]) on 9/18/2020 11:43:34 AM    Referred By: [email or web address]   SELF           Confirmed By:NORM ALLISON MD       2D ECHO:  TTE:  Results for orders placed or performed during the hospital encounter of 03/11/20   Echo Color Flow Doppler? Yes; Bubble Contrast? Yes   Result Value Ref Range    Ascending aorta 3.07 cm    STJ 2.66 cm    AV mean gradient 4 mmHg    Ao peak pablo 1.22 m/s    Ao VTI 23.73 cm    IVS 0.62 0.6 - 1.1 cm    LA size 2.90 cm    Left Atrium Major Axis 3.56 cm    Left Atrium Minor Axis 4.07  cm    LVIDd 3.96 3.5 - 6.0 cm    LVIDs 2.35 2.1 - 4.0 cm    LVOT diameter 1.99 cm    LVOT peak VTI 23.94 cm    Posterior Wall 0.57 (A) 0.6 - 1.1 cm    MV Peak A Richy 0.61 m/s    E wave decelartion time 140.52 msec    MV Peak E Richy 0.98 m/s    PV Peak D Richy 0.55 m/s    PV Peak S Richy 0.78 m/s    RA Major Axis 3.04 cm    RA Width 2.50 cm    RVDD 2.05 cm    Sinus 2.92 cm    TAPSE 2.13 cm    TDI LATERAL 0.16 m/s    TDI SEPTAL 0.10 m/s    LA WIDTH 2.82 cm    LV Diastolic Volume 68.26 mL    LV Systolic Volume 19.22 mL    LVOT peak richy 1.35 m/s    LV LATERAL E/E' RATIO 6.13 m/s    LV SEPTAL E/E' RATIO 9.80 m/s    FS 41 %    LA volume 26.40 cm3    LV mass 62.58 g    Left Ventricle Relative Wall Thickness 0.29 cm    AV valve area 3.14 cm2    AV Velocity Ratio 1.11     AV index (prosthetic) 1.01     E/A ratio 1.61     Mean e' 0.13 m/s    Pulm vein S/D ratio 1.42     LVOT area 3.1 cm2    LVOT stroke volume 74.42 cm3    AV peak gradient 6 mmHg    E/E' ratio 7.54 m/s    LV Systolic Volume Index 12.8 mL/m2    LV Diastolic Volume Index 45.38 mL/m2    LA Volume Index 17.6 mL/m2    LV Mass Index 42 g/m2    BSA 1.51 m2    Right Atrial Pressure (from IVC) 3 mmHg    Narrative    · Normal left ventricular systolic function. The estimated ejection   fraction is 63%.  · No wall motion abnormalities.  · Normal LV diastolic function.  · Normal right ventricular systolic function.  · Normal central venous pressure (3 mmHg).          MARCIE:  No results found. However, due to the size of the patient record, not all encounters were searched. Please check Results Review for a complete set of results.    ASSESSMENT/PLAN:           Pre-op Assessment    I have reviewed the Patient Summary Reports.     I have reviewed the Nursing Notes. I have reviewed the NPO Status.   I have reviewed the Medications.     Review of Systems  Anesthesia Hx:  History of prior surgery of interest to airway management or planning: Denies Family Hx of Anesthesia  complications.   Denies Personal Hx of Anesthesia complications.   Hematology/Oncology:         -- Cancer in past history: Other (see Oncology comments) Oncology Comments: melanoma     Cardiovascular:   Hypertension    Pulmonary:   Shortness of breath    Renal/:   Chronic Renal Disease renal calculi    Hepatic/GI:   GERD Crohn's - ileostomy   Neurological:   Seizures    Psych:   Psychiatric History anxiety depression          Physical Exam  General:  Well nourished    Airway/Jaw/Neck:  Airway Findings: Mouth Opening: Normal Tongue: Normal  General Airway Assessment: Adult  Mallampati: II  Improves to I with phonation.      Dental:  Dental Findings: In tact   Chest/Lungs:  Chest/Lungs Findings: Clear to auscultation, Normal Respiratory Rate     Heart/Vascular:  Heart Findings: Rate: Normal  Rhythm: Regular Rhythm  Sounds: Normal        Mental Status:  Mental Status Findings:  Cooperative, Alert and Oriented         Anesthesia Plan  Type of Anesthesia, risks & benefits discussed:  Anesthesia Type:  general, MAC  Patient's Preference:   Intra-op Monitoring Plan: standard ASA monitors  Intra-op Monitoring Plan Comments:   Post Op Pain Control Plan: multimodal analgesia, IV/PO Opioids PRN and per primary service following discharge from PACU  Post Op Pain Control Plan Comments:   Induction:   IV  Beta Blocker:  Patient is not currently on a Beta-Blocker (No further documentation required).       Informed Consent: Patient understands risks and agrees with Anesthesia plan.  Questions answered. Anesthesia consent signed with patient.  ASA Score: 2  emergent   Day of Surgery Review of History & Physical: I have interviewed and examined the patient. I have reviewed the patient's H&P dated:  There are no significant changes.  H&P update referred to the surgeon.         Ready For Surgery From Anesthesia Perspective.

## 2020-09-23 NOTE — ANESTHESIA PROCEDURE NOTES
Intubation  Performed by: Marianna Noel CRNA  Authorized by: Matt Weber MD     Intubation:     Induction:  Intravenous    Intubated:  Postinduction    Mask Ventilation:  Easy mask    Attempts:  1    Attempted By:  CRNA    Method of Intubation:  Direct    Blade:  Sarmiento 2    Laryngeal View Grade: Grade I - full view of chords      Difficult Airway Encountered?: No      Complications:  None    Airway Device:  Oral endotracheal tube    Airway Device Size:  7.0    Style/Cuff Inflation:  Cuffed (inflated to minimal occlusive pressure)    Inflation Amount (mL):  5    Tube secured:  22    Secured at:  The lips    Placement Verified By:  Capnometry    Complicating Factors:  None    Findings Post-Intubation:  BS equal bilateral and atraumatic/condition of teeth unchanged

## 2020-09-23 NOTE — OP NOTE
Ureteroscopic Stone Extraction Operative Note    Preoperative Diagnosis:   Urolithiasis    Postoperative Diagnosis:  Urolithiasis    Procedure:  1. LEFT Ureteroscopic stone extraction with laser lithotripsy and basket extraction of stone fragments.  2. LEFT Ureteral stent removal    Attending Surgeon: Sascha Florentino MD    Anesthesia: General Endotracheal    EBL: <10 mL    Complications: None    Findings: All remaining stone fragments were less than 1 mm    Drains: None    Reason for procedure: Ivy Salgado is a very pleasant 40 y.o. female who presented with a history of urolithiasis. She had a stent placed for obstructing stone and infection last week. She presents for definitive management of the remaining stone.    Procedure in detail:  Informed consent was obtained by explaining all risks and benefits of the procedure to the patient in detail.  After informed consent, the patient was brought to the OR where General Endotracheal anesthesia  was administered by the anesthesia staff. Appropriate perioperative antibiotics were given within 30 minutes of beginning the procedure. A formal timeout was performed prior to the procedure. After induction, the patient was gently placed in lithotomy position with all pressure points padded. Bilateral sequential compression devices were applied and activated prior to induction. The patient was prepped and draped in standard fashion.    A 20 Faroese rigid cystoscope was passed per urethra into the bladder. Inspection of the bladder demonstrated no primary bladder pathology and the suspected urolithiasis was not seen to have passed into the bladder. The left ureteral stent was grasped and a flexible-tipped Glidewire was advanced into the ureter under fluoroscopic vision. A semirigid ureteroscope as advanced alongside the safety wire to the level of the stone.    Under direct vision, the stone was fragmented with the holmium laser into fragments appropriate for extraction.   The remaining stone fragments after lithotripsy and stone extraction were all smaller than 1 mm in greatest diameter. No ureteral stent was left in place.     She tolerated the procedure well and was extubated and transferred in stable condition to the recovery room.     We will plan to see her back in 2-3 months for continued evaluation including metabolic evaluation if appropriate.

## 2020-09-23 NOTE — DISCHARGE INSTRUCTIONS
Cystoscopy    Cystoscopy is a procedure that lets your doctor look directly inside your urethra and bladder. It can be used to:  · Help diagnose a problem with your urethra, bladder, or kidneys.  · Take a sample (biopsy) of bladder or urethral tissue.  · Treat certain problems (such as removing kidney stones).  · Place a stent to bypass an obstruction.  · Take special X-rays of the kidneys.  Based on the findings, your doctor may recommend other tests or treatments.  What is a cystoscope?  A cystoscope is a telescope-like instrument that contains lenses and fiberoptics (small glass wires that make bright light). The cystoscope may be straight and rigid, or flexible to bend around curves in the urethra. The doctor may look directly into the cystoscope, or project the image onto a monitor.  Getting ready  · Ask your doctor if you should stop taking any medicines before the procedure.  · Ask whether you should avoid eating or drinking anything after midnight before the procedure.  · Follow any other instructions your doctor gives you.  Tell your doctor before the exam if you:  · Take any medicines, such as aspirin or blood thinners  · Have allergies to any medicines  · Are pregnant   The procedure  Cystoscopy is done in the doctors office, surgery center, or hospital. The doctor and a nurse are present during the procedure. It takes only a few minutes, longer if a biopsy, X-ray, or treatment needs to be done.  During the procedure:  · You lie on an exam table on your back, knees bent and legs apart. You are covered with a drape.  · Your urethra and the area around it are washed. Anesthetic jelly may be applied to numb the urethra. Other pain medicine is usually not needed. In some cases, you may be offered a mild sedative to help you relax. If a more extensive procedure is to be done, such as a biopsy or kidney stone removal, general anesthesia may be needed.  · The cystoscope is inserted. A sterile fluid is put  into the bladder to expand it. You may feel pressure from this fluid.  · When the procedure is done, the cystoscope is removed.  After the procedure  If you had a sedative, general anesthesia, or spinal anesthesia, you must have someone drive you home. Once youre home:  · Drink plenty of fluids.  · You may have burning or light bleeding when you urinate--this is normal.  · Medicines may be prescribed to ease any discomfort or prevent infection. Take these as directed.  · Call your doctor if you have heavy bleeding or blood clots, burning that lasts more than a day, a fever over 100°F  (38° C), or trouble urinating.  Date Last Reviewed: 1/1/2017 © 2000-2017 [employer]. [address] [place] [address], [place] [address]. All rights reserved. This information is not intended as a substitute for professional medical care. Always follow your healthcare professional's instructions.              Shock Wave Lithotripsy  Passing a kidney stone can be very painful. Shock wave lithotripsy is a treatment that helps by breaking the kidney stone into smaller pieces that are easier to pass. This treatment is also called extracorporeal shock wave lithotripsy (ESWL). Lithotripsy takes about an hour. Its done in a hospital, lithotripsy center, or mobile lithotripsy van. You will likely go home the same day. This treatment is not used for all types of kidney stones. Your healthcare provider will discuss whether this is the right treatment for the type of stone you have.      Energy waves strike the stone, which begins to crack. The stone crumbles into tiny pieces.   During the procedure  · You get medicine to prevent pain and help you relax or sleep during lithotripsy. Once this takes effect, the procedure will start.  · A flexible tube (stent) with holes in it may be placed into your ureter, the tube that connects the kidney and the bladder. This helps keep urine flowing from the kidney.  · Your healthcare provider then uses  X-ray or ultrasound to find the exact location of the kidney stone.  · Sound waves are aimed at the stone and sent at high speed. If youre awake, you may feel a tapping as they pass through your body.  After the procedure  · Youll be closely watched in a recovery room for about 1 to 3 hours. Antibiotics and pain medicine may be prescribed before you leave.  · Youll have a follow-up visit in a few weeks. If you received a stent, it will be removed. Your healthcare provider will also check for pieces of stone. If large pieces remain, you may need a second lithotripsy or another procedure.  Possible risks and complications  · Infection  · Bleeding in the kidney  · Bruising of the kidney or skin  · Blockage (obstruction) of the ureter  · Failure to break up the stone (other procedures may be needed)   Passing the stone  It can take a day to several weeks for the pieces of stone to leave your body. Drink plenty of liquids to help flush your system. During this time:  · Your urine may be cloudy or slightly bloody. You may even see small pieces of stone.  · You may have a slight fever and some pain. Take prescribed or over-the-counter pain medicine as instructed by your healthcare provider.  · You may be asked to strain your urine to collect some stone particles. These will be studied in the lab.  When to call your healthcare provider   Call your healthcare provider right away if you have any of the following:  · Fever of 100.4°F (38°C) or higher, or as directed by your healthcare provider  · Heavy bleeding  · Pain that doesnt go away with medicine  · Upset stomach and vomiting  · Problems urinating   Date Last Reviewed: 1/1/2017 © 2000-2017 [employer]. [address] [place] [address], [place] [address]. All rights reserved. This information is not intended as a substitute for professional medical care. Always follow your healthcare professional's instructions.            Ureteral Stents  A ureteral stent is a  soft plastic tube with holes in it. Its temporarily inserted into a ureter to help drain urine into the bladder. One end goes in the kidney. The other end goes in the bladder. A coil on each end holds the stent in place. The stent cant be seen from outside the body. It shouldnt interfere with your normal routine. Your stent will be put in by a doctor trained in treating the urinary tract (a urologist) or another specialist. The procedure is done in a hospital or surgery center. Youll likely go home the same day.  When is a ureteral stent used?  A ureteral stent may be used:  · To bypass a blockage in a kidney or ureter.  · During kidney stone removal.  · To let a ureter heal after surgery.    Before the Procedure  Your healthcare provider will give you instructions to prepare for the procedure. X-rays or other imaging tests of your kidneys and ureters may be done beforehand.  During the procedure  · You receive medicine to prevent pain and help you relax or sleep during the procedure. Once this takes effect, the procedure starts.  · The doctor inserts a cystoscope (lighted instrument) through the urethra and into the bladder. This shows the opening to the ureter.  · A thin wire is carefully threaded through the cystoscope, up the ureter, and into the kidney. The stent is inserted over the wire.  · A fluoroscope (special X-ray machine) is used to help position the stent. When the stent is in place, the wire and cystoscope are removed.  While you have a stent  · Some discomfort is normal. Certain movements may trigger pain or a feeling that you need to urinate. You may also feel mild soreness or pressure before or during urination. These symptoms will go away a few days after the stent is removed.  · Medicine to control pain or bladder spasms or to prevent infection may be prescribed. Take this as directed.  · Drink plenty of fluids to help flush out your urinary tract.  · Your urine may be slightly pink or red.  This is due to bleeding caused by minor irritation from the stent. This may happen on and off while you have the stent.  · As with any synthetic device placed in the body, there is a risk of infection. The stent may have to be removed if this happens.   How long will you need a stent?  The stent is often taken out after the blockage in the ureter is treated or the ureter has healed. This may take 1 week to 2 weeks, or longer. If a stent is needed for a long time, it may need to be changed every few months.  When to call your healthcare provider  Contact your healthcare provider right away if:  · Your urine contains blood clots or you see a large amount of blood-tinged urine  · You have symptoms similar to those you had before the stent was placed  · You constantly leak urine  · You have a fever over 100.4°F (38°C), chills, nausea, or vomiting  · Your pain is not relieved with medicine  · The end of the stent comes out of the urethra   Date Last Reviewed: 1/1/2017 © 2000-2017 [employer]. [address] [place] [address]. All rights reserved. This information is not intended as a substitute for professional medical care. Always follow your healthcare professional's instructions.              PATIENT INSTRUCTIONS  POST-ANESTHESIA    IMMEDIATELY FOLLOWING SURGERY:  Do not drive or operate machinery for the first twenty four hours after surgery.  Do not make any important decisions for twenty four hours after surgery or while taking narcotic pain medications or sedatives.  If you develop intractable nausea and vomiting or a severe headache please notify your doctor immediately.    FOLLOW-UP:  Please make an appointment with your surgeon as instructed. You do not need to follow up with anesthesia unless specifically instructed to do so.    WOUND CARE INSTRUCTIONS (if applicable):  Keep a dry clean dressing on the anesthesia/puncture wound site if there is drainage.  Once the wound has quit  draining you may leave it open to air.  Generally you should leave the bandage intact for twenty four hours unless there is drainage.  If the epidural site drains for more than 36-48 hours please call the anesthesia department.    QUESTIONS?:  Please feel free to call your physician or the hospital  if you have any questions, and they will be happy to assist you.       [facility] Anesthesia Department  [address] [facility]  [phone]

## 2020-09-23 NOTE — TRANSFER OF CARE
"Anesthesia Transfer of Care Note    Patient: Ivy Salgado    Procedure(s) Performed: Procedure(s) (LRB):  URETEROSCOPY (Left)  CYSTOSCOPY  REMOVAL, STENT, URETER  LITHOTRIPSY, USING LASER  REMOVAL, CALCULUS, URETER, URETEROSCOPIC    Patient location: PACU    Anesthesia Type: general    Transport from OR: Transported from OR on 6-10 L/min O2 by face mask with adequate spontaneous ventilation    Post pain: adequate analgesia    Post assessment: no apparent anesthetic complications    Post vital signs: stable    Level of consciousness: awake    Nausea/Vomiting: no nausea/vomiting    Complications: none    Transfer of care protocol was followed      Last vitals:   Visit Vitals  /78 (BP Location: Left arm, Patient Position: Lying)   Pulse 80   Temp 36.9 °C (98.4 °F) (Oral)   Resp 18   Ht 5' 1" (1.549 m)   Wt 56.7 kg (125 lb)   LMP 03/30/2015   SpO2 99%   Breastfeeding No   BMI 23.62 kg/m²     "

## 2020-09-23 NOTE — INTERVAL H&P NOTE
The patient has been examined and the H&P has been reviewed:    I concur with the findings and no changes have occurred since H&P was written.    Surgery risks, benefits and alternative options discussed and understood by patient/family.    To OR today for left ureteroscopy.    Urine dipstick today +blood, negative for all other components        There are no hospital problems to display for this patient.

## 2020-09-23 NOTE — DISCHARGE SUMMARY
[facility]  Discharge Note  Short Stay    Admit Date: 9/23/2020    Discharge Date and Time: 09/23/2020 10:31 AM      Attending Physician: Sascha Florentino MD     Discharge Provider: Aramis Ennis    Diagnoses:  Active Hospital Problems    Diagnosis  POA    Ureteral stone [N20.1]  Yes      Resolved Hospital Problems   No resolved problems to display.       Discharged Condition: good    Hospital Course: Patient was admitted for left ureteroscopy and tolerated the procedure well with no complications. The patient was discharged home in good condition on the same day.       Final Diagnoses: Same as principal problem.    Disposition: Home or Self Care    Follow up/Patient Instructions:    Medications:  Reconciled Home Medications:   Current Discharge Medication List      START taking these medications    Details   !! oxyCODONE-acetaminophen (PERCOCET) 5-325 mg per tablet Take 1 tablet by mouth every 6 (six) hours as needed.  Qty: 10 tablet, Refills: 0       !! - Potential duplicate medications found. Please discuss with provider.      CONTINUE these medications which have NOT CHANGED    Details   amoxicillin-clavulanate 875-125mg (AUGMENTIN) 875-125 mg per tablet Take 1 tablet by mouth 2 (two) times a day. for 7 days  Qty: 14 tablet, Refills: 0      clonazePAM (KLONOPIN) 1 MG tablet TAKE 1 AND 1/2 TABLETS BY MOUTH TWICE DAILY AS NEEDED FOR ANXIETY  Qty: 90 tablet, Refills: 0    Associated Diagnoses: Generalized anxiety disorder      !! diphenoxylate-atropine 2.5-0.025 mg (LOMOTIL) 2.5-0.025 mg per tablet Take 1 tablet by mouth 4 (four) times daily as needed. for diarrhea  Qty: 100 tablet, Refills: 0    Associated Diagnoses: Crohn's disease of small and large intestines with complication      dronabinoL (MARINOL) 5 MG capsule Take 1 capsule (5 mg total) by mouth 2 (two) times daily before meals.  Qty: 60 capsule, Refills: 0    Associated Diagnoses: Crohn's disease of both small and large intestine with  complication; Encounter for long-term (current) use of high-risk medication      famotidine (PEPCID) 20 MG tablet Take 20 mg by mouth 2 (two) times daily.      fluconazole (DIFLUCAN) 150 MG Tab Take 1 tablet (150 mg total) by mouth every 72 hours as needed.  Qty: 3 tablet, Refills: 0    Associated Diagnoses: Vaginal yeast infection      gabapentin (NEURONTIN) 300 MG capsule Take 1 capsule (300 mg total) by mouth once daily. After two weeks, take one twice daily  Qty: 60 capsule, Refills: 11      loperamide (IMODIUM) 2 mg capsule Take 2 mg by mouth daily as needed for Diarrhea.      mirtazapine (REMERON SOL-TAB) 30 MG disintegrating tablet Take 1 tablet (30 mg total) by mouth nightly.  Qty: 90 tablet, Refills: 3    Associated Diagnoses: Generalized anxiety disorder      multivitamin (ONE DAILY MULTIVITAMIN) per tablet Take 1 tablet by mouth once daily.      oxybutynin (DITROPAN) 5 MG Tab Take 1 tablet (5 mg total) by mouth 3 (three) times daily as needed (bladder spasms).  Qty: 30 tablet, Refills: 0      oxyCODONE-acetaminophen (PERCOCET) 7.5-325 mg per tablet Take 1 tablet by mouth every 6 (six) hours as needed for Pain.  Qty: 12 tablet, Refills: 0      potassium citrate (UROCIT-K 15) 15 mEq TbSR Take 2 tablets by mouth 2 (two) times daily.  Qty: 360 tablet, Refills: 3      promethazine (PHENERGAN) 25 MG tablet TAKE 1 TABLET(25 MG) BY MOUTH EVERY 6 HOURS AS NEEDED  Qty: 30 tablet, Refills: 0      sumatriptan (IMITREX) 50 MG tablet Take 1 tab po prn migraine; may repeat once in 2 hours prn; do not exceed 2 tabs in 24 hours  Qty: 12 tablet, Refills: 0      tamsulosin (FLOMAX) 0.4 mg Cap Take 1 capsule (0.4 mg total) by mouth once daily.  Qty: 30 capsule, Refills: 0      valACYclovir (VALTREX) 500 MG tablet TAKE 1 TABLET BY MOUTH EVERY DAY  Qty: 90 tablet, Refills: 2      zolpidem (AMBIEN) 10 mg Tab TAKE 1 TABLET BY MOUTH EVERY NIGHT AT BEDTIME  Qty: 30 tablet, Refills: 1    Associated Diagnoses: Anxiety      !!  diphenoxylate-atropine 2.5-0.025 mg (LOMOTIL) 2.5-0.025 mg per tablet Take 1 tablet by mouth 4 (four) times daily as needed. for diarrhea  Qty: 100 tablet, Refills: 1    Associated Diagnoses: Crohn's disease of small and large intestines with complication      food supplemt, lactose-reduced (BOOST BREEZE NUTRITIONAL) 0.04-1.05 gram-kcal/mL Liqd Use 3 times per day  Qty: 6399 mL, Refills: 5      !! oxyCODONE-acetaminophen (PERCOCET) 5-325 mg per tablet Take 1 tablet by mouth every 4 (four) hours as needed for Pain.  Qty: 20 tablet, Refills: 0      vedolizumab (ENTYVIO) 300 mg SolR injection Inject 300 mg into the vein.       !! - Potential duplicate medications found. Please discuss with provider.        Discharge Procedure Orders   Diet general     Follow-up Information     Sascha Florentino MD In 1 month.    Specialty: Urology  Contact information:  [facility]  [facility] [address] [phone]

## 2020-09-23 NOTE — ANESTHESIA POSTPROCEDURE EVALUATION
Anesthesia Post Evaluation    Patient: Ivy Salgado    Procedure(s) Performed: Procedure(s) (LRB):  URETEROSCOPY (Left)  CYSTOSCOPY  REMOVAL, STENT, URETER  LITHOTRIPSY, USING LASER  REMOVAL, CALCULUS, URETER, URETEROSCOPIC    Final Anesthesia Type: general    Patient location during evaluation: PACU  Patient participation: Yes- Able to Participate  Level of consciousness: awake and alert and oriented  Post-procedure vital signs: reviewed and stable  Pain management: adequate  Airway patency: patent    PONV status at discharge: No PONV  Anesthetic complications: no      Cardiovascular status: blood pressure returned to baseline and hemodynamically stable  Respiratory status: unassisted  Hydration status: euvolemic  Follow-up not needed.          Vitals Value Taken Time   /68 09/23/20 1425   Temp 36.8 °C (98.2 °F) 09/23/20 1425   Pulse 83 09/23/20 1425   Resp 18 09/23/20 1425   SpO2 97 % 09/23/20 1425         No case tracking events are documented in the log.      Pain/Michael Score: Pain Rating Prior to Med Admin: 6 (9/23/2020  1:40 PM)  Pain Rating Post Med Admin: 4 (9/23/2020  2:25 PM)  Michael Score: 10 (9/23/2020  2:25 PM)

## 2020-09-24 ENCOUNTER — INFUSION (OUTPATIENT)
Dept: INFECTIOUS DISEASES | Facility: HOSPITAL | Age: 40
End: 2020-09-24
Attending: INTERNAL MEDICINE
Payer: MEDICARE

## 2020-09-24 VITALS
DIASTOLIC BLOOD PRESSURE: 74 MMHG | RESPIRATION RATE: 18 BRPM | OXYGEN SATURATION: 100 % | HEART RATE: 74 BPM | BODY MASS INDEX: 23.3 KG/M2 | HEIGHT: 61 IN | TEMPERATURE: 98 F | SYSTOLIC BLOOD PRESSURE: 122 MMHG | WEIGHT: 123.44 LBS

## 2020-09-24 DIAGNOSIS — K50.00 CROHN'S DISEASE OF SMALL INTESTINE WITHOUT COMPLICATION: Primary | ICD-10-CM

## 2020-09-24 PROCEDURE — 25000003 PHARM REV CODE 250: Performed by: INTERNAL MEDICINE

## 2020-09-24 PROCEDURE — 96360 HYDRATION IV INFUSION INIT: CPT

## 2020-09-24 RX ORDER — ACETAMINOPHEN 325 MG/1
650 TABLET ORAL
Status: CANCELLED | OUTPATIENT
Start: 2020-10-01

## 2020-09-24 RX ORDER — HEPARIN 100 UNIT/ML
500 SYRINGE INTRAVENOUS
Status: DISCONTINUED | OUTPATIENT
Start: 2020-09-24 | End: 2020-09-24 | Stop reason: HOSPADM

## 2020-09-24 RX ORDER — EPINEPHRINE 1 MG/ML
0.3 INJECTION, SOLUTION, CONCENTRATE INTRAVENOUS
Status: CANCELLED | OUTPATIENT
Start: 2020-10-01

## 2020-09-24 RX ORDER — IPRATROPIUM BROMIDE AND ALBUTEROL SULFATE 2.5; .5 MG/3ML; MG/3ML
3 SOLUTION RESPIRATORY (INHALATION)
Status: CANCELLED | OUTPATIENT
Start: 2020-10-01

## 2020-09-24 RX ORDER — HEPARIN 100 UNIT/ML
500 SYRINGE INTRAVENOUS
Status: CANCELLED | OUTPATIENT
Start: 2020-10-01

## 2020-09-24 RX ORDER — METHYLPREDNISOLONE SOD SUCC 125 MG
40 VIAL (EA) INJECTION
Status: CANCELLED | OUTPATIENT
Start: 2020-10-01

## 2020-09-24 RX ORDER — DIPHENHYDRAMINE HYDROCHLORIDE 50 MG/ML
25 INJECTION INTRAMUSCULAR; INTRAVENOUS
Status: CANCELLED | OUTPATIENT
Start: 2020-10-01

## 2020-09-24 RX ORDER — SODIUM CHLORIDE 0.9 % (FLUSH) 0.9 %
10 SYRINGE (ML) INJECTION
Status: DISCONTINUED | OUTPATIENT
Start: 2020-09-24 | End: 2020-09-24 | Stop reason: HOSPADM

## 2020-09-24 RX ORDER — SODIUM CHLORIDE 0.9 % (FLUSH) 0.9 %
10 SYRINGE (ML) INJECTION
Status: CANCELLED | OUTPATIENT
Start: 2020-10-01

## 2020-09-24 RX ADMIN — SODIUM CHLORIDE 1000 ML: 0.9 INJECTION, SOLUTION INTRAVENOUS at 12:09

## 2020-09-24 NOTE — PROGRESS NOTES
Patient arrived for Normal Saline over one hour.  Patient tolerated infusion well and left in NAD.            Detail Level: Detailed

## 2020-09-25 ENCOUNTER — OFFICE VISIT (OUTPATIENT)
Dept: INTERNAL MEDICINE | Facility: CLINIC | Age: 40
End: 2020-09-25
Payer: MEDICARE

## 2020-09-25 ENCOUNTER — HOSPITAL ENCOUNTER (OUTPATIENT)
Dept: RADIOLOGY | Facility: OTHER | Age: 40
Discharge: HOME | End: 2020-09-25
Attending: SPECIALIST
Payer: MEDICARE

## 2020-09-25 VITALS
BODY MASS INDEX: 23.68 KG/M2 | WEIGHT: 125.44 LBS | OXYGEN SATURATION: 98 % | DIASTOLIC BLOOD PRESSURE: 80 MMHG | HEIGHT: 61 IN | HEART RATE: 63 BPM | SYSTOLIC BLOOD PRESSURE: 122 MMHG

## 2020-09-25 DIAGNOSIS — E55.9 VITAMIN D DEFICIENCY: ICD-10-CM

## 2020-09-25 DIAGNOSIS — F41.1 GENERALIZED ANXIETY DISORDER: ICD-10-CM

## 2020-09-25 DIAGNOSIS — Z93.2 S/P ILEOSTOMY: Chronic | ICD-10-CM

## 2020-09-25 DIAGNOSIS — E87.6 HYPOKALEMIA: ICD-10-CM

## 2020-09-25 DIAGNOSIS — R00.2 PALPITATIONS: ICD-10-CM

## 2020-09-25 DIAGNOSIS — I47.19 ATRIAL TACHYCARDIA: ICD-10-CM

## 2020-09-25 DIAGNOSIS — K50.819 CROHN'S DISEASE OF BOTH SMALL AND LARGE INTESTINE WITH COMPLICATION: ICD-10-CM

## 2020-09-25 DIAGNOSIS — N20.1 LEFT URETERAL STONE: Primary | ICD-10-CM

## 2020-09-25 DIAGNOSIS — F32.0 CURRENT MILD EPISODE OF MAJOR DEPRESSIVE DISORDER WITHOUT PRIOR EPISODE: ICD-10-CM

## 2020-09-25 DIAGNOSIS — K50.018 CROHN'S DISEASE OF SMALL INTESTINE WITH OTHER COMPLICATION: Chronic | ICD-10-CM

## 2020-09-25 DIAGNOSIS — G40.219 COMPLEX PARTIAL EPILEPSY WITH GENERALIZATION AND WITH INTRACTABLE EPILEPSY: ICD-10-CM

## 2020-09-25 DIAGNOSIS — N83.209 CYST OF OVARY, UNSPECIFIED LATERALITY: ICD-10-CM

## 2020-09-25 DIAGNOSIS — Z87.440 HISTORY OF RECURRENT UTI (URINARY TRACT INFECTION): ICD-10-CM

## 2020-09-25 DIAGNOSIS — Z79.899 ENCOUNTER FOR LONG-TERM (CURRENT) USE OF HIGH-RISK MEDICATION: ICD-10-CM

## 2020-09-25 DIAGNOSIS — E04.2 MULTIPLE THYROID NODULES: ICD-10-CM

## 2020-09-25 DIAGNOSIS — M85.89 OSTEOPENIA OF MULTIPLE SITES: ICD-10-CM

## 2020-09-25 PROBLEM — N39.0 RECURRENT UTI: Chronic | Status: RESOLVED | Noted: 2018-02-06 | Resolved: 2020-09-25

## 2020-09-25 PROCEDURE — 99214 OFFICE O/P EST MOD 30 MIN: CPT | Mod: PBBFAC,25 | Performed by: INTERNAL MEDICINE

## 2020-09-25 PROCEDURE — 76856 US EXAM PELVIC COMPLETE: CPT | Mod: 26,,, | Performed by: RADIOLOGY

## 2020-09-25 PROCEDURE — 99214 PR OFFICE/OUTPT VISIT, EST, LEVL IV, 30-39 MIN: ICD-10-PCS | Mod: S$PBB,,, | Performed by: INTERNAL MEDICINE

## 2020-09-25 PROCEDURE — 99999 PR PBB SHADOW E&M-EST. PATIENT-LVL IV: ICD-10-PCS | Mod: PBBFAC,,, | Performed by: INTERNAL MEDICINE

## 2020-09-25 PROCEDURE — 76830 TRANSVAGINAL US NON-OB: CPT | Mod: 26,,, | Performed by: RADIOLOGY

## 2020-09-25 PROCEDURE — 99999 PR PBB SHADOW E&M-EST. PATIENT-LVL IV: CPT | Mod: PBBFAC,,, | Performed by: INTERNAL MEDICINE

## 2020-09-25 PROCEDURE — 99214 OFFICE O/P EST MOD 30 MIN: CPT | Mod: S$PBB,,, | Performed by: INTERNAL MEDICINE

## 2020-09-25 PROCEDURE — 76856 US PELVIS COMP WITH TRANSVAG NON-OB (XPD): ICD-10-PCS | Mod: 26,,, | Performed by: RADIOLOGY

## 2020-09-25 PROCEDURE — 76830 US PELVIS COMP WITH TRANSVAG NON-OB (XPD): ICD-10-PCS | Mod: 26,,, | Performed by: RADIOLOGY

## 2020-09-25 PROCEDURE — 76830 TRANSVAGINAL US NON-OB: CPT | Mod: TC

## 2020-09-25 NOTE — PROGRESS NOTES
Subjective:       Patient ID: Ivy Salgado is a 40 y.o. female.    Chief Complaint: KIDNEY STONE F/U    Pt had L adnexal mass and saw GYN-onc for ex lap/BSO; mass was benign. 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 Per GYN, this will be followed but no further intervention was required. 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 There was difficulty in healing initially but this has improved. She has regular f/u with derm.       She has anxiety which is fairly well controlled with current meds. She uses xanax many nights. She was seeing psychiatry but trying to est with new provider due to insurance change. No SI/HI.      She has had issues with recurrent UTI in the past. Most recently she was in hospital for L ureteral stone with UTI; she had stent placed with subsequent removal and stone retrieval. Doing well now. She has regular f/u with urology.       Crohn's is fairly well controlled. She has some abd pain with nausea and occasional vomiting for which she has d/w GI but doesn't feel this is active crohns. No fever. She is on entyvio. She sees Dr. Ross with GI regularly who manages her chronic opiates and is on pain contract. She has high output from her stoma which GI is managing but there are no good solutions to this.     GERD is well controlled with prn zantac. No associated red flag symptoms.    She has osteopenia on DEXA 1/2018. Reiterated importance of ca/d. We discussed other meds but she is not on prednisone now and osteopenia is mild so will hold off.     She has thyroid nodules that were small on last u/s 10/2019 and not meeting criteria for biopsy. Clinically she is euthyroid. Repeat u/s can be done in 10/2020 and if 5 yrs stability (2024) can stop surveillance.     Recent labs showed hypokalemia with K at 2.9. she is due for repeat as this was  done during her acute illness above.    Review of Systems   Constitutional: Positive for activity change. Negative for fatigue, fever and unexpected weight change.   HENT: Negative for ear pain, hearing loss, rhinorrhea, sore throat and trouble swallowing.    Eyes: Negative for discharge and visual disturbance.   Respiratory: Negative for cough, chest tightness, shortness of breath and wheezing.    Cardiovascular: Positive for palpitations. Negative for chest pain.   Gastrointestinal: Positive for abdominal pain, diarrhea and nausea. Negative for blood in stool, constipation and vomiting.   Endocrine: Positive for polyuria. Negative for polydipsia.   Genitourinary: Positive for difficulty urinating and hematuria. Negative for dysuria, frequency and menstrual problem.   Musculoskeletal: Negative for arthralgias, joint swelling and neck pain.   Neurological: Negative for weakness, numbness and headaches.   Psychiatric/Behavioral: Negative for confusion and dysphoric mood.       Objective:      Physical Exam  Constitutional:       Appearance: She is well-developed.   HENT:      Head: Normocephalic and atraumatic.      Right Ear: Tympanic membrane, ear canal and external ear normal.      Left Ear: Tympanic membrane, ear canal and external ear normal.      Nose: No mucosal edema or rhinorrhea.      Mouth/Throat:      Pharynx: No oropharyngeal exudate or posterior oropharyngeal erythema.   Eyes:      Pupils: Pupils are equal, round, and reactive to light.   Neck:      Musculoskeletal: Neck supple.      Thyroid: No thyroid mass or thyromegaly.      Vascular: No carotid bruit.   Cardiovascular:      Rate and Rhythm: Normal rate and regular rhythm.      Heart sounds: Normal heart sounds, S1 normal and S2 normal. No murmur.   Pulmonary:      Effort: Pulmonary effort is normal.      Breath sounds: Normal breath sounds. No wheezing.   Abdominal:      General: Bowel sounds are normal. There is no distension.      Palpations:  Abdomen is soft. There is no mass.      Tenderness: There is abdominal tenderness.      Comments: Tenderness around stoma and incision but no lit fluctuance/induration/erythema   Lymphadenopathy:      Cervical: No cervical adenopathy.   Neurological:      Mental Status: She is alert and oriented to person, place, and time.      Cranial Nerves: No cranial nerve deficit.   Psychiatric:         Judgment: Judgment normal.         Assessment:       1. Left ureteral stone    2. History of recurrent UTI (urinary tract infection)    3. Atrial tachycardia    4. Palpitations    5. Generalized anxiety disorder    6. Complex partial epilepsy with generalization and with intractable epilepsy    7. Multiple thyroid nodules    8. Vitamin D deficiency    9. Crohn's disease of small intestine with other complication    10. S/P ileostomy    11. Osteopenia of multiple sites    12. Current mild episode of major depressive disorder without prior episode    13. Hypokalemia        Plan:       1. Keep f/u with specialists  2. Prior labs reviewed with pt; update BMP  3. Thyroid u/s

## 2020-09-27 ENCOUNTER — PATIENT MESSAGE (OUTPATIENT)
Dept: OBSTETRICS AND GYNECOLOGY | Facility: CLINIC | Age: 40
End: 2020-09-27

## 2020-09-27 RX ORDER — DRONABINOL 5 MG/1
5 CAPSULE ORAL
Qty: 60 CAPSULE | Refills: 0 | Status: SHIPPED | OUTPATIENT
Start: 2020-09-27 | End: 2020-10-27 | Stop reason: SDUPTHER

## 2020-09-28 ENCOUNTER — TELEPHONE (OUTPATIENT)
Dept: OBSTETRICS AND GYNECOLOGY | Facility: CLINIC | Age: 40
End: 2020-09-28

## 2020-09-28 RX ORDER — METRONIDAZOLE 250 MG/1
250 TABLET ORAL 3 TIMES DAILY
Qty: 21 TABLET | Refills: 0 | Status: SHIPPED | OUTPATIENT
Start: 2020-09-28 | End: 2020-10-05

## 2020-09-28 NOTE — TELEPHONE ENCOUNTER
----- Message from Laverne Daniel sent at 9/28/2020 12:09 PM CDT -----  Pt called asking to speak with the office about getting a prescription for Flagyl please reach out to pt at [phone]    [facility] [phone]

## 2020-09-28 NOTE — TELEPHONE ENCOUNTER
Pt states that she is having milky white discharge with fishy smell. Pt requesting Flagyl please advise.

## 2020-09-29 LAB
COMPN STONE: NORMAL
SPECIMEN SOURCE: NORMAL
STONE ANALYSIS IR-IMP: NORMAL

## 2020-10-02 ENCOUNTER — PATIENT MESSAGE (OUTPATIENT)
Dept: OBSTETRICS AND GYNECOLOGY | Facility: CLINIC | Age: 40
End: 2020-10-02

## 2020-10-03 ENCOUNTER — PATIENT MESSAGE (OUTPATIENT)
Dept: INTERNAL MEDICINE | Facility: CLINIC | Age: 40
End: 2020-10-03

## 2020-10-03 DIAGNOSIS — E87.6 HYPOKALEMIA: Primary | ICD-10-CM

## 2020-10-06 ENCOUNTER — PATIENT MESSAGE (OUTPATIENT)
Dept: INTERNAL MEDICINE | Facility: CLINIC | Age: 40
End: 2020-10-06

## 2020-10-07 ENCOUNTER — INFUSION (OUTPATIENT)
Dept: INFECTIOUS DISEASES | Facility: HOSPITAL | Age: 40
End: 2020-10-07
Attending: INTERNAL MEDICINE
Payer: MEDICARE

## 2020-10-07 ENCOUNTER — HOSPITAL ENCOUNTER (OUTPATIENT)
Dept: RADIOLOGY | Facility: HOSPITAL | Age: 40
Discharge: HOME | End: 2020-10-07
Attending: INTERNAL MEDICINE
Payer: MEDICARE

## 2020-10-07 DIAGNOSIS — E04.2 MULTIPLE THYROID NODULES: ICD-10-CM

## 2020-10-07 DIAGNOSIS — E87.6 HYPOKALEMIA: ICD-10-CM

## 2020-10-07 LAB
ANION GAP SERPL CALC-SCNC: 12 MMOL/L (ref 8–16)
BUN SERPL-MCNC: 10 MG/DL (ref 6–20)
CALCIUM SERPL-MCNC: 9.2 MG/DL (ref 8.7–10.5)
CHLORIDE SERPL-SCNC: 104 MMOL/L (ref 95–110)
CO2 SERPL-SCNC: 24 MMOL/L (ref 23–29)
CREAT SERPL-MCNC: 0.8 MG/DL (ref 0.5–1.4)
EST. GFR  (AFRICAN AMERICAN): >60 ML/MIN/1.73 M^2
EST. GFR  (NON AFRICAN AMERICAN): >60 ML/MIN/1.73 M^2
GLUCOSE SERPL-MCNC: 84 MG/DL (ref 70–110)
POTASSIUM SERPL-SCNC: 3.8 MMOL/L (ref 3.5–5.1)
SODIUM SERPL-SCNC: 140 MMOL/L (ref 136–145)

## 2020-10-07 PROCEDURE — 36415 COLL VENOUS BLD VENIPUNCTURE: CPT

## 2020-10-07 PROCEDURE — 76536 US SOFT TISSUE HEAD NECK THYROID: ICD-10-PCS | Mod: 26,,, | Performed by: RADIOLOGY

## 2020-10-07 PROCEDURE — 80048 BASIC METABOLIC PNL TOTAL CA: CPT

## 2020-10-07 PROCEDURE — 76536 US EXAM OF HEAD AND NECK: CPT | Mod: TC

## 2020-10-07 PROCEDURE — 76536 US EXAM OF HEAD AND NECK: CPT | Mod: 26,,, | Performed by: RADIOLOGY

## 2020-10-07 NOTE — PROGRESS NOTES
Pt arrived for lab draw. Labs obtained via butterfly stick on second attempt to R AC. 2x2 gauze applied with coban. Left in NAD.

## 2020-10-11 ENCOUNTER — PATIENT MESSAGE (OUTPATIENT)
Dept: OBSTETRICS AND GYNECOLOGY | Facility: CLINIC | Age: 40
End: 2020-10-11

## 2020-10-12 DIAGNOSIS — Z12.31 VISIT FOR SCREENING MAMMOGRAM: Primary | ICD-10-CM

## 2020-10-13 ENCOUNTER — HOSPITAL ENCOUNTER (OUTPATIENT)
Dept: RADIOLOGY | Facility: HOSPITAL | Age: 40
Discharge: HOME | End: 2020-10-13
Attending: SPECIALIST
Payer: MEDICARE

## 2020-10-13 VITALS — BODY MASS INDEX: 22.67 KG/M2 | WEIGHT: 120 LBS

## 2020-10-13 DIAGNOSIS — Z12.31 VISIT FOR SCREENING MAMMOGRAM: ICD-10-CM

## 2020-10-13 PROCEDURE — 77063 MAMMO DIGITAL SCREENING BILAT WITH TOMO: ICD-10-PCS | Mod: 26,,, | Performed by: RADIOLOGY

## 2020-10-13 PROCEDURE — 77063 BREAST TOMOSYNTHESIS BI: CPT | Mod: 26,,, | Performed by: RADIOLOGY

## 2020-10-13 PROCEDURE — 77067 SCR MAMMO BI INCL CAD: CPT | Mod: TC

## 2020-10-13 PROCEDURE — 77067 SCR MAMMO BI INCL CAD: CPT | Mod: 26,,, | Performed by: RADIOLOGY

## 2020-10-13 PROCEDURE — 77067 MAMMO DIGITAL SCREENING BILAT WITH TOMO: ICD-10-PCS | Mod: 26,,, | Performed by: RADIOLOGY

## 2020-10-27 ENCOUNTER — INFUSION (OUTPATIENT)
Dept: INFECTIOUS DISEASES | Facility: HOSPITAL | Age: 40
End: 2020-10-27
Attending: INTERNAL MEDICINE
Payer: MEDICARE

## 2020-10-27 VITALS
TEMPERATURE: 99 F | WEIGHT: 125.75 LBS | DIASTOLIC BLOOD PRESSURE: 98 MMHG | RESPIRATION RATE: 18 BRPM | BODY MASS INDEX: 23.74 KG/M2 | SYSTOLIC BLOOD PRESSURE: 164 MMHG | OXYGEN SATURATION: 97 % | HEIGHT: 61 IN | HEART RATE: 102 BPM

## 2020-10-27 DIAGNOSIS — K50.00 CROHN'S DISEASE OF SMALL INTESTINE WITHOUT COMPLICATION: Primary | ICD-10-CM

## 2020-10-27 PROCEDURE — 25000003 PHARM REV CODE 250: Performed by: INTERNAL MEDICINE

## 2020-10-27 PROCEDURE — 96360 HYDRATION IV INFUSION INIT: CPT

## 2020-10-27 PROCEDURE — 96365 THER/PROPH/DIAG IV INF INIT: CPT

## 2020-10-27 PROCEDURE — 63600175 PHARM REV CODE 636 W HCPCS: Mod: JG | Performed by: INTERNAL MEDICINE

## 2020-10-27 PROCEDURE — 96361 HYDRATE IV INFUSION ADD-ON: CPT

## 2020-10-27 RX ORDER — SODIUM CHLORIDE 0.9 % (FLUSH) 0.9 %
10 SYRINGE (ML) INJECTION
Status: DISCONTINUED | OUTPATIENT
Start: 2020-10-27 | End: 2020-10-27 | Stop reason: HOSPADM

## 2020-10-27 RX ORDER — HEPARIN 100 UNIT/ML
500 SYRINGE INTRAVENOUS
Status: CANCELLED | OUTPATIENT
Start: 2020-11-24

## 2020-10-27 RX ORDER — DIPHENHYDRAMINE HYDROCHLORIDE 50 MG/ML
25 INJECTION INTRAMUSCULAR; INTRAVENOUS
Status: DISPENSED | OUTPATIENT
Start: 2020-10-27 | End: 2020-10-27

## 2020-10-27 RX ORDER — METHYLPREDNISOLONE SOD SUCC 125 MG
40 VIAL (EA) INJECTION
Status: CANCELLED | OUTPATIENT
Start: 2020-11-24

## 2020-10-27 RX ORDER — EPINEPHRINE 1 MG/ML
0.3 INJECTION, SOLUTION, CONCENTRATE INTRAVENOUS
Status: CANCELLED | OUTPATIENT
Start: 2020-11-24

## 2020-10-27 RX ORDER — DIPHENHYDRAMINE HYDROCHLORIDE 50 MG/ML
25 INJECTION INTRAMUSCULAR; INTRAVENOUS
Status: CANCELLED | OUTPATIENT
Start: 2020-11-24

## 2020-10-27 RX ORDER — ACETAMINOPHEN 325 MG/1
650 TABLET ORAL
Status: DISPENSED | OUTPATIENT
Start: 2020-10-27 | End: 2020-10-27

## 2020-10-27 RX ORDER — EPINEPHRINE 0.3 MG/.3ML
0.3 INJECTION SUBCUTANEOUS
Status: DISPENSED | OUTPATIENT
Start: 2020-10-27 | End: 2020-10-27

## 2020-10-27 RX ORDER — METHYLPREDNISOLONE SOD SUCC 125 MG
40 VIAL (EA) INJECTION
Status: DISPENSED | OUTPATIENT
Start: 2020-10-27 | End: 2020-10-27

## 2020-10-27 RX ORDER — IPRATROPIUM BROMIDE AND ALBUTEROL SULFATE 2.5; .5 MG/3ML; MG/3ML
3 SOLUTION RESPIRATORY (INHALATION)
Status: CANCELLED | OUTPATIENT
Start: 2020-11-24

## 2020-10-27 RX ORDER — IPRATROPIUM BROMIDE AND ALBUTEROL SULFATE 2.5; .5 MG/3ML; MG/3ML
3 SOLUTION RESPIRATORY (INHALATION)
Status: DISPENSED | OUTPATIENT
Start: 2020-10-27 | End: 2020-10-27

## 2020-10-27 RX ORDER — ACETAMINOPHEN 325 MG/1
650 TABLET ORAL
Status: CANCELLED | OUTPATIENT
Start: 2020-11-24

## 2020-10-27 RX ORDER — HEPARIN 100 UNIT/ML
500 SYRINGE INTRAVENOUS
Status: DISCONTINUED | OUTPATIENT
Start: 2020-10-27 | End: 2020-10-27 | Stop reason: HOSPADM

## 2020-10-27 RX ORDER — HEPARIN 100 UNIT/ML
500 SYRINGE INTRAVENOUS
Status: DISCONTINUED | OUTPATIENT
Start: 2020-10-27 | End: 2020-10-27

## 2020-10-27 RX ORDER — SODIUM CHLORIDE 0.9 % (FLUSH) 0.9 %
10 SYRINGE (ML) INJECTION
Status: CANCELLED | OUTPATIENT
Start: 2020-11-24

## 2020-10-27 RX ADMIN — VEDOLIZUMAB 300 MG: 300 INJECTION, POWDER, LYOPHILIZED, FOR SOLUTION INTRAVENOUS at 01:10

## 2020-10-27 RX ADMIN — SODIUM CHLORIDE 1000 ML: 0.9 INJECTION, SOLUTION INTRAVENOUS at 01:10

## 2020-10-27 NOTE — PROGRESS NOTES
Patient arrived for Normal Saline over one hour and Entyvio infusion.  Patient tolerated both infusions well and left in NAD.

## 2020-11-03 ENCOUNTER — PATIENT MESSAGE (OUTPATIENT)
Dept: INTERNAL MEDICINE | Facility: CLINIC | Age: 40
End: 2020-11-03

## 2020-11-03 ENCOUNTER — LAB VISIT (OUTPATIENT)
Dept: LAB | Facility: HOSPITAL | Age: 40
End: 2020-11-03
Attending: INTERNAL MEDICINE
Payer: MEDICARE

## 2020-11-03 ENCOUNTER — PATIENT MESSAGE (OUTPATIENT)
Dept: UROLOGY | Facility: CLINIC | Age: 40
End: 2020-11-03

## 2020-11-03 ENCOUNTER — PATIENT MESSAGE (OUTPATIENT)
Dept: DERMATOLOGY | Facility: CLINIC | Age: 40
End: 2020-11-03

## 2020-11-03 DIAGNOSIS — R00.2 PALPITATIONS: Primary | ICD-10-CM

## 2020-11-03 DIAGNOSIS — N39.0 RECURRENT UTI: Primary | ICD-10-CM

## 2020-11-03 DIAGNOSIS — L65.9 ALOPECIA: ICD-10-CM

## 2020-11-03 DIAGNOSIS — L65.9 ALOPECIA: Primary | ICD-10-CM

## 2020-11-03 DIAGNOSIS — R55 SYNCOPE, UNSPECIFIED SYNCOPE TYPE: ICD-10-CM

## 2020-11-03 LAB
FERRITIN SERPL-MCNC: 43 NG/ML (ref 20–300)
IRON SERPL-MCNC: 49 UG/DL (ref 30–160)
SATURATED IRON: 12 % (ref 20–50)
TOTAL IRON BINDING CAPACITY: 422 UG/DL (ref 250–450)
TRANSFERRIN SERPL-MCNC: 285 MG/DL (ref 200–375)

## 2020-11-03 PROCEDURE — 82728 ASSAY OF FERRITIN: CPT

## 2020-11-03 PROCEDURE — 83540 ASSAY OF IRON: CPT

## 2020-11-03 PROCEDURE — 36415 COLL VENOUS BLD VENIPUNCTURE: CPT

## 2020-11-04 ENCOUNTER — PATIENT MESSAGE (OUTPATIENT)
Dept: UROLOGY | Facility: CLINIC | Age: 40
End: 2020-11-04

## 2020-11-05 ENCOUNTER — LAB VISIT (OUTPATIENT)
Dept: LAB | Facility: HOSPITAL | Age: 40
End: 2020-11-05
Payer: MEDICARE

## 2020-11-05 DIAGNOSIS — F41.1 GENERALIZED ANXIETY DISORDER: ICD-10-CM

## 2020-11-05 DIAGNOSIS — N39.0 RECURRENT UTI: ICD-10-CM

## 2020-11-05 PROCEDURE — 87086 URINE CULTURE/COLONY COUNT: CPT

## 2020-11-06 ENCOUNTER — PATIENT MESSAGE (OUTPATIENT)
Dept: DERMATOLOGY | Facility: CLINIC | Age: 40
End: 2020-11-06

## 2020-11-06 ENCOUNTER — PATIENT MESSAGE (OUTPATIENT)
Dept: GASTROENTEROLOGY | Facility: CLINIC | Age: 40
End: 2020-11-06

## 2020-11-06 LAB — BACTERIA UR CULT: NO GROWTH

## 2020-11-06 RX ORDER — MIRTAZAPINE 30 MG/1
30 TABLET, ORALLY DISINTEGRATING ORAL NIGHTLY
Qty: 90 TABLET | Refills: 0 | Status: SHIPPED | OUTPATIENT
Start: 2020-11-06 | End: 2021-02-11

## 2020-11-06 RX ORDER — CLONAZEPAM 1 MG/1
TABLET ORAL
Qty: 90 TABLET | Refills: 0 | Status: SHIPPED | OUTPATIENT
Start: 2020-11-06 | End: 2020-12-09 | Stop reason: SDUPTHER

## 2020-11-09 ENCOUNTER — OFFICE VISIT (OUTPATIENT)
Dept: DERMATOLOGY | Facility: CLINIC | Age: 40
End: 2020-11-09
Payer: MEDICARE

## 2020-11-09 VITALS — TEMPERATURE: 99 F

## 2020-11-09 DIAGNOSIS — L65.0 TELOGEN EFFLUVIUM: Primary | ICD-10-CM

## 2020-11-09 PROCEDURE — 99212 PR OFFICE/OUTPT VISIT, EST, LEVL II, 10-19 MIN: ICD-10-PCS | Mod: S$GLB,,, | Performed by: DERMATOLOGY

## 2020-11-09 PROCEDURE — 99212 OFFICE O/P EST SF 10 MIN: CPT | Mod: S$GLB,,, | Performed by: DERMATOLOGY

## 2020-11-09 NOTE — PROGRESS NOTES
Subjective:       Patient ID:  Ivy Salgado is a 40 y.o. female who presents for   Chief Complaint   Patient presents with    Hair Loss     scalp     Hair Loss - Initial  Affected locations: scalp  Duration: 1 month  Signs and Symptoms: hair loss, shedding.  Severity: mild to moderate  Timing: constant  Exacerbated by: had surgery for kidney stones.  Relieving factors/Treatments tried: nothing      Review of Systems   Skin: Negative for itching and rash.        Objective:    Physical Exam   Constitutional: She appears well-developed and well-nourished. No distress.   Neurological: She is alert and oriented to person, place, and time. She is not disoriented.   Psychiatric: She has a normal mood and affect.   Skin:   Areas Examined (abnormalities noted in diagram):   Scalp / Hair Palpated and Inspected  Head / Face Inspection Performed              Diagram Legend     Erythematous scaling macule/papule c/w actinic keratosis       Vascular papule c/w angioma      Pigmented verrucoid papule/plaque c/w seborrheic keratosis      Yellow umbilicated papule c/w sebaceous hyperplasia      Irregularly shaped tan macule c/w lentigo     1-2 mm smooth white papules consistent with Milia      Movable subcutaneous cyst with punctum c/w epidermal inclusion cyst      Subcutaneous movable cyst c/w pilar cyst      Firm pink to brown papule c/w dermatofibroma      Pedunculated fleshy papule(s) c/w skin tag(s)      Evenly pigmented macule c/w junctional nevus     Mildly variegated pigmented, slightly irregular-bordered macule c/w mildly atypical nevus      Flesh colored to evenly pigmented papule c/w intradermal nevus       Pink pearly papule/plaque c/w basal cell carcinoma      Erythematous hyperkeratotic cursted plaque c/w SCC      Surgical scar with no sign of skin cancer recurrence      Open and closed comedones      Inflammatory papules and pustules      Verrucoid papule consistent consistent with wart     Erythematous  eczematous patches and plaques     Dystrophic onycholytic nail with subungual debris c/w onychomycosis     Umbilicated papule    Erythematous-base heme-crusted tan verrucoid plaque consistent with inflamed seborrheic keratosis     Erythematous Silvery Scaling Plaque c/w Psoriasis     See annotation      Assessment / Plan:        Telogen effluvium    Reassured the patient that this is a normal response to an acute illness, surgical operation, certain medications, or psychological stress. At first, the shedding is profuse and a general thinning of the scalp hair may become evident but after several months a peak is reached and hair fall begins to lessen, gradually tapering back to normal over 6-9 months in most cases. As the hair fall tapers off, the scalp thickens back up to normal, but recovery may be incomplete in some cases.    Follow up if symptoms worsen or fail to improve.

## 2020-11-12 ENCOUNTER — TELEPHONE (OUTPATIENT)
Dept: ELECTROPHYSIOLOGY | Facility: CLINIC | Age: 40
End: 2020-11-12

## 2020-11-12 ENCOUNTER — OFFICE VISIT (OUTPATIENT)
Dept: SPORTS MEDICINE | Facility: CLINIC | Age: 40
End: 2020-11-12
Payer: MEDICARE

## 2020-11-12 VITALS
HEIGHT: 61 IN | SYSTOLIC BLOOD PRESSURE: 116 MMHG | WEIGHT: 125 LBS | HEART RATE: 85 BPM | DIASTOLIC BLOOD PRESSURE: 82 MMHG | BODY MASS INDEX: 23.6 KG/M2

## 2020-11-12 DIAGNOSIS — M75.42 IMPINGEMENT SYNDROME OF LEFT SHOULDER: ICD-10-CM

## 2020-11-12 DIAGNOSIS — G89.29 CHRONIC LEFT SHOULDER PAIN: Primary | ICD-10-CM

## 2020-11-12 DIAGNOSIS — M25.512 CHRONIC LEFT SHOULDER PAIN: Primary | ICD-10-CM

## 2020-11-12 DIAGNOSIS — I49.8 OTHER SPECIFIED CARDIAC ARRHYTHMIAS: Primary | ICD-10-CM

## 2020-11-12 PROCEDURE — 99214 OFFICE O/P EST MOD 30 MIN: CPT | Mod: PBBFAC,25 | Performed by: PHYSICIAN ASSISTANT

## 2020-11-12 PROCEDURE — 20610 DRAIN/INJ JOINT/BURSA W/O US: CPT | Mod: S$PBB,LT,ICN, | Performed by: PHYSICIAN ASSISTANT

## 2020-11-12 PROCEDURE — 99999 PR PBB SHADOW E&M-EST. PATIENT-LVL IV: ICD-10-PCS | Mod: PBBFAC,,, | Performed by: PHYSICIAN ASSISTANT

## 2020-11-12 PROCEDURE — 99999 PR PBB SHADOW E&M-EST. PATIENT-LVL IV: CPT | Mod: PBBFAC,,, | Performed by: PHYSICIAN ASSISTANT

## 2020-11-12 PROCEDURE — 99214 PR OFFICE/OUTPT VISIT, EST, LEVL IV, 30-39 MIN: ICD-10-PCS | Mod: 25,S$PBB,ICN, | Performed by: PHYSICIAN ASSISTANT

## 2020-11-12 PROCEDURE — 20610 DRAIN/INJ JOINT/BURSA W/O US: CPT | Mod: PBBFAC | Performed by: PHYSICIAN ASSISTANT

## 2020-11-12 PROCEDURE — 99214 OFFICE O/P EST MOD 30 MIN: CPT | Mod: 25,S$PBB,ICN, | Performed by: PHYSICIAN ASSISTANT

## 2020-11-12 PROCEDURE — 20610 PR DRAIN/INJECT LARGE JOINT/BURSA: ICD-10-PCS | Mod: S$PBB,LT,ICN, | Performed by: PHYSICIAN ASSISTANT

## 2020-11-12 RX ORDER — TRIAMCINOLONE ACETONIDE 40 MG/ML
80 INJECTION, SUSPENSION INTRA-ARTICULAR; INTRAMUSCULAR
Status: COMPLETED | OUTPATIENT
Start: 2020-11-12 | End: 2020-11-12

## 2020-11-12 RX ORDER — BUPIVACAINE HYDROCHLORIDE 2.5 MG/ML
4 INJECTION, SOLUTION INFILTRATION; PERINEURAL
Status: COMPLETED | OUTPATIENT
Start: 2020-11-12 | End: 2020-11-12

## 2020-11-12 RX ORDER — LIDOCAINE HYDROCHLORIDE 10 MG/ML
4 INJECTION INFILTRATION; PERINEURAL
Status: COMPLETED | OUTPATIENT
Start: 2020-11-12 | End: 2020-11-12

## 2020-11-12 RX ADMIN — LIDOCAINE HYDROCHLORIDE 4 ML: 10 INJECTION INFILTRATION; PERINEURAL at 10:11

## 2020-11-12 RX ADMIN — TRIAMCINOLONE ACETONIDE 80 MG: 40 INJECTION, SUSPENSION INTRA-ARTICULAR; INTRAMUSCULAR at 10:11

## 2020-11-12 RX ADMIN — BUPIVACAINE HYDROCHLORIDE 10 MG: 2.5 INJECTION, SOLUTION INFILTRATION; PERINEURAL at 10:11

## 2020-11-13 NOTE — PROGRESS NOTES
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      Shoulder pain began 2 years ago without injury or trauma and is located of the posterior superior shoulder and is intermittent. Pain mostly occurs with movement of her shoulder and activity. Pain has worsened in June 2020 and she received good pain relief with a subacromial CSI which wore off a few months ago.She has tried over the counter medicine over that time with no pain relief. She is requesting a repeat shoulder injections today.     She has a history of Crohn's disease and reports that she has not been on steroid in a long time (years).     She reports that the pain is worse with overhead activity. It also bothers her at night.    Is affecting ADLs.   RHD      Review of Systems   Constitution: Negative. Negative for chills, fever and night sweats.   HENT: Negative for congestion and headaches.    Eyes: Negative for blurred vision, left vision loss and right vision loss.   Cardiovascular: Negative for chest pain and syncope.   Respiratory: Negative for cough and shortness of breath.    Endocrine: Negative for polydipsia, polyphagia and polyuria.   Hematologic/Lymphatic: Negative for bleeding problem. Does not bruise/bleed easily.   Skin: Negative for dry skin, itching and rash.   Musculoskeletal: Negative for falls and muscle weakness.   Gastrointestinal: Negative for abdominal pain and bowel incontinence.   Genitourinary: Negative for bladder incontinence and nocturia.   Neurological: Negative for disturbances in coordination, loss of balance and seizures.   Psychiatric/Behavioral: Negative for depression. The patient does not have insomnia.    Allergic/Immunologic: Negative for hives and persistent infections.     PAST MEDICAL HISTORY:   Past Medical History:   Diagnosis Date    Abnormal Pap smear 2007    Abnormal Pap smear 5/26/2011    Anemia     Anxiety      Arthritis     C. difficile diarrhea     Crohn's disease     Depression 8/5/2017    Encounter for blood transfusion     Genital HSV     History of colposcopy with cervical biopsy 2007 and 7/2011 2007-LYLA I  and 7/2011- LYLA I    Hypertension     Kidney stone     Kidney stone     Melanoma     Recurrent UTI 4/3/2013    S/P ileostomy 7/9/2012    Sterilization 6/23/2012     PAST SURGICAL HISTORY:   Past Surgical History:   Procedure Laterality Date    ABDOMINAL SURGERY      APPENDECTOMY      BILATERAL SALPINGO-OOPHORECTOMY (BSO) Bilateral 5/30/2019    Procedure: SALPINGO-OOPHORECTOMY, BILATERAL;  Surgeon: Rupa German MD;  Location: [facility] OR [address] [place] [address];  Service: OB/GYN;  Laterality: Bilateral;    BLADDER SURGERY      partial cystectomy due to fistula    CKC      COLON SURGERY      COLONOSCOPY      CYSTOSCOPY  9/23/2020    Procedure: CYSTOSCOPY;  Surgeon: Sascha Florentino MD;  Location: [facility] OR [address] [place] [address];  Service: Urology;;    CYSTOSCOPY W/ URETERAL STENT PLACEMENT Left 9/15/2020    Procedure: CYSTOSCOPY, WITH URETERAL STENT INSERTION;  Surgeon: Sascha Florentino MD;  Location: [facility] OR [address] [place] [address];  Service: Urology;  Laterality: Left;    DIAGNOSTIC LAPAROSCOPY N/A 7/9/2020    Procedure: LAPAROSCOPY, DIAGNOSTIC;  Surgeon: Gilson El MD;  Location: [facility] OR;  Service: OB/GYN;  Laterality: N/A;    EXCISION OF MELANOMA  07/17/2019    ILEOSTOMY      LAPAROSCOPIC LYSIS OF ADHESIONS N/A 7/9/2020    Procedure: LYSIS, ADHESIONS, LAPAROSCOPIC;  Surgeon: Gilson El MD;  Location: [facility] OR;  Service: OB/GYN;  Laterality: N/A;    LASER LITHOTRIPSY  9/23/2020    Procedure: LITHOTRIPSY, USING LASER;  Surgeon: Sascha Florentino MD;  Location: [facility] OR [address] [place] [address];  Service: Urology;;    LYSIS OF ADHESIONS N/A 5/30/2019    Procedure: LYSIS, ADHESIONS;  Surgeon: Rupa German MD;  Location: [facility] OR [address] [place] [address];  Service: OB/GYN;  Laterality: N/A;    OOPHORECTOMY Right 04/16/2015    [facility]  PLACEMENT  02/21/2017    SKIN BIOPSY      SMALL INTESTINE SURGERY      age 18 Y    TOTAL ABDOMINAL HYSTERECTOMY  04/16/2015    TOTAL COLECTOMY      TUBAL LIGATION  06/06/2012    UPPER GASTROINTESTINAL ENDOSCOPY      URETEROSCOPIC REMOVAL OF URETERIC CALCULUS  9/23/2020    Procedure: REMOVAL, CALCULUS, URETER, URETEROSCOPIC;  Surgeon: Sascha Florentino MD;  Location: [facility] OR [address] [place] [address];  Service: Urology;;    URETEROSCOPY Left 9/23/2020    Procedure: URETEROSCOPY;  Surgeon: Sascha Florentino MD;  Location: [facility] OR [address] [place] [address];  Service: Urology;  Laterality: Left;     FAMILY HISTORY:   Family History   Problem Relation Age of Onset    Colon cancer Father     Cancer Father         colon cancer    Liver cancer Father     Hypertension Mother     Cancer Maternal Grandfather         esophageal      Skin cancer Maternal Grandfather     Endometrial cancer Maternal Aunt     Crohn's disease Brother     Breast cancer Neg Hx     Ovarian cancer Neg Hx     Melanoma Neg Hx      SOCIAL HISTORY:   Social History     Socioeconomic History    Marital status: Single     Spouse name: Not on file    Number of children: Not on file    Years of education: Not on file    Highest education level: Not on file   Occupational History     Employer: [employer]   Social Needs    Financial resource strain: Very hard    Food insecurity     Worry: Often true     Inability: Sometimes true    Transportation needs     Medical: No     Non-medical: No   Tobacco Use    Smoking status: Never Smoker    Smokeless tobacco: Never Used   Substance and Sexual Activity    Alcohol use: Not Currently     Alcohol/week: 0.0 standard drinks     Frequency: Never     Drinks per session: Patient refused     Binge frequency: Never    Drug use: No    Sexual activity: Yes     Partners: Male     Birth control/protection: Surgical     Comment: HYST   Lifestyle    Physical activity     Days per week: 2 days     Minutes per session:  10 min    Stress: To some extent   Relationships    Social connections     Talks on phone: Not on file     Gets together: Once a week     Attends Nondenominational service: Not on file     Active member of club or organization: No     Attends meetings of clubs or organizations: Never     Relationship status: Living with partner   Other Topics Concern    Are you pregnant or think you may be? No    Breast-feeding No   Social History Narrative    Not on file       MEDICATIONS:   Current Outpatient Medications:     clonazePAM (KLONOPIN) 1 MG tablet, TAKE 1 AND 1/2 TABLETS BY MOUTH TWICE DAILY AS NEEDED FOR ANXIETY, Disp: 90 tablet, Rfl: 0    diphenoxylate-atropine 2.5-0.025 mg (LOMOTIL) 2.5-0.025 mg per tablet, Take 1 tablet by mouth 4 (four) times daily as needed. for diarrhea, Disp: 100 tablet, Rfl: 0    dronabinoL (MARINOL) 5 MG capsule, Take 1 capsule (5 mg total) by mouth 2 (two) times daily before meals., Disp: 60 capsule, Rfl: 0    famotidine (PEPCID) 20 MG tablet, Take 20 mg by mouth 2 (two) times daily., Disp: , Rfl:     fluconazole (DIFLUCAN) 150 MG Tab, Take 1 tablet (150 mg total) by mouth every 72 hours as needed., Disp: 3 tablet, Rfl: 0    food supplemt, lactose-reduced (BOOST BREEZE NUTRITIONAL) 0.04-1.05 gram-kcal/mL Liqd, Use 3 times per day, Disp: 6399 mL, Rfl: 5    gabapentin (NEURONTIN) 300 MG capsule, TAKE ONE CAPSULE BY MOUTH ONCE DAILY. AFTER 2 WEEKS, TAKE 1 CAPSULE TWICE DAILY, Disp: 60 capsule, Rfl: 11    loperamide (IMODIUM) 2 mg capsule, Take 2 mg by mouth daily as needed for Diarrhea., Disp: , Rfl:     mirtazapine (REMERON SOL-TAB) 30 MG disintegrating tablet, Take 1 tablet (30 mg total) by mouth nightly., Disp: 90 tablet, Rfl: 0    multivitamin (ONE DAILY MULTIVITAMIN) per tablet, Take 1 tablet by mouth once daily., Disp: , Rfl:     oxyCODONE-acetaminophen (PERCOCET) 7.5-325 mg per tablet, Take 1 tablet by mouth every 6 (six) hours as needed for Pain., Disp: 12 tablet, Rfl: 0     "promethazine (PHENERGAN) 25 MG tablet, TAKE 1 TABLET(25 MG) BY MOUTH EVERY 6 HOURS AS NEEDED, Disp: 30 tablet, Rfl: 0    sumatriptan (IMITREX) 50 MG tablet, Take 1 tab po prn migraine; may repeat once in 2 hours prn; do not exceed 2 tabs in 24 hours, Disp: 12 tablet, Rfl: 0    valACYclovir (VALTREX) 500 MG tablet, TAKE 1 TABLET BY MOUTH EVERY DAY, Disp: 90 tablet, Rfl: 2    vedolizumab (ENTYVIO) 300 mg SolR injection, Inject 300 mg into the vein., Disp: , Rfl:     zolpidem (AMBIEN) 10 mg Tab, TAKE 1 TABLET BY MOUTH EVERY NIGHT AT BEDTIME, Disp: 30 tablet, Rfl: 1    potassium citrate (UROCIT-K 15) 15 mEq TbSR, Take 2 tablets by mouth 2 (two) times daily., Disp: 360 tablet, Rfl: 3  No current facility-administered medications for this visit.   ALLERGIES:   Review of patient's allergies indicates:   Allergen Reactions    Azathioprine sodium Other (See Comments)     Other reaction(s): pancreatitis  Other reaction(s): pancreatitis    Methotrexate analogues Other (See Comments)     leukopenia    Stelara [ustekinumab] Other (See Comments)     Multiple infections    Zofran [ondansetron hcl (pf)] Other (See Comments)     Per patient causes prolong QT    Vancomycin analogues Hives    Morphine Itching and Other (See Comments)     Other reaction(s): Itching    Bactrim [sulfamethoxazole-trimethoprim] Palpitations    Ciprofloxacin Palpitations       VITAL SIGNS: /82   Pulse 85   Ht 5' 1" (1.549 m)   Wt 56.7 kg (125 lb)   LMP 03/30/2015   BMI 23.62 kg/m²      PHYSICAL EXAMINATION:  General:  The patient is alert and oriented x 3.  Mood is pleasant.  Observation of ears, eyes and nose reveal no gross abnormalities.  No labored breathing observed.  Gait is coordinated. Patient can toe walk and heel walk without difficulty.      left SHOULDER / UPPER EXTREMITY EXAM    OBSERVATION:     Swelling  none  Deformity  none   Discoloration  none   Scapular winging none   Scars   none  Atrophy  none    TENDERNESS / " CREPITUS (T/C):          T/C      T/C   Clavicle   -/-  SUPRAspinatus    +/-     AC Jt.    -/-  INFRAspinatus  -/-    SC Jt.    -/-  Deltoid    -/-      G. Tuberosity  -/-  LH BICEP groove  -/-   Acromion:  -/-  Midline Neck   -/-     Scapular Spine -/-  Trapezium   -/-   SMA Scapula  -/-  GH jt. line - post  +/-     Scapulothoracic  -/-         ROM: (* = with pain)  Right shoulder   Left shoulder        AROM (PROM)   AROM (PROM)   FE    170° (175°)     165° (170°)  *   ER at 0°    60°  (65°)    60°  (65°)*   ER at 90° ABD  90°  (90°)    90°  (90°)*   IR at 90°  ABD   NA  (40°)     NA  (40°)      IR (spine level)   T10     T12*    STRENGTH: (* = with pain) RIGHT SHOULDER  LEFT SHOULDER   SCAPTION   5/5    4+/5* at 0 and 5/5 at 30    IR    5/5    5/5   ER    5/5    4/5   BICEPS   5/5    5/5   Deltoid    5/5    5/5     SIGNS:  Painful side       NEER   +    OKHADIJAHS  +    CARTER   +    SPEEDS  +     DROP ARM   neg   BELLY PRESS neg   Superior escape none    LIFT-OFF  neg   X-Body ADD    neg    MOVING VALGUS neg        STABILITY TESTING    RIGHT SHOULDER   LEFT SHOULDER     Translation     Anterior  up face     up face    Posterior  up face    up face    Sulcus   < 10mm    < 10 mm     Signs   Apprehension   neg      neg       Relocation   no change     no change      Jerk test  neg     neg    EXTREMITY NEURO-VASCULAR EXAM    Sensation grossly intact to light touch all dermatomal regions.    DTR 2+ Biceps, Triceps, BR and Negative Lillies sign   Grossly intact motor function at Elbow, Wrist and Hand   Distal pulses radial and ulnar 2+, brisk cap refill, symmetric.      NECK:  Painless FROM and spinous processes non-tender. Negative Spurlings sign.      OTHER FINDINGS:  + scapular dyskinesia  Negative bear hug test.     Xrays:  Xrays of the left shoulder 3 views were reviewed from 6/11/20 by me today. No fracture, subluxation, or other significant bony or joint abnormality is identified.       ASSESSMENT:    left:  1. Shoulder pain, chronic   2.   Shoulder pain, impingement with possible rotator cuff tear  3.   Scapular dyskinesia      PLAN:    1.PROCEDURE NOTE:   After cortisone consent and post-injection information was given, the shoulder was prepped with betadyne and alcohol. The left subacromial space of the shoulder was injected with 2 cc 40 mg kenalog and 4 cc lidocaine and 4 cc 0.25% marcaine.   Bandaid was applied. Patient was reminded to rest with RICE for 48 hours post injection and to call clinic immediately for any adverse side effects as explained in clinic today. Patient was warned of possible blood sugar and/or blood pressure changes during that time. Told to call clinic or go to ED if the changes become concerning.      2. Patient does not want to do formal PT despite me discussing the importance of this.     3. Will order MRI to evaluate for rotator cuff tear. Patient will call back to schedule for a later date.     4. Ice compresses.     5. Will call her with results of MRI      All questions were answered, pt will contact us for questions or concerns in the interim.

## 2020-11-16 ENCOUNTER — OFFICE VISIT (OUTPATIENT)
Dept: ELECTROPHYSIOLOGY | Facility: CLINIC | Age: 40
End: 2020-11-16
Attending: INTERNAL MEDICINE
Payer: MEDICARE

## 2020-11-16 ENCOUNTER — PATIENT MESSAGE (OUTPATIENT)
Dept: ELECTROPHYSIOLOGY | Facility: CLINIC | Age: 40
End: 2020-11-16

## 2020-11-16 ENCOUNTER — HOSPITAL ENCOUNTER (OUTPATIENT)
Dept: CARDIOLOGY | Facility: CLINIC | Age: 40
Discharge: HOME | End: 2020-11-16
Payer: MEDICARE

## 2020-11-16 VITALS
HEIGHT: 61 IN | HEART RATE: 88 BPM | WEIGHT: 125 LBS | BODY MASS INDEX: 23.6 KG/M2 | DIASTOLIC BLOOD PRESSURE: 69 MMHG | SYSTOLIC BLOOD PRESSURE: 140 MMHG

## 2020-11-16 DIAGNOSIS — Z87.898 HISTORY OF PROLONGED Q-T INTERVAL ON ECG: ICD-10-CM

## 2020-11-16 DIAGNOSIS — I49.8 OTHER SPECIFIED CARDIAC ARRHYTHMIAS: ICD-10-CM

## 2020-11-16 DIAGNOSIS — R55 SYNCOPE, UNSPECIFIED SYNCOPE TYPE: ICD-10-CM

## 2020-11-16 DIAGNOSIS — R00.2 PALPITATIONS: ICD-10-CM

## 2020-11-16 PROCEDURE — 93010 ELECTROCARDIOGRAM REPORT: CPT | Mod: S$PBB,,, | Performed by: INTERNAL MEDICINE

## 2020-11-16 PROCEDURE — 93010 RHYTHM STRIP: ICD-10-PCS | Mod: S$PBB,,, | Performed by: INTERNAL MEDICINE

## 2020-11-16 PROCEDURE — 99213 OFFICE O/P EST LOW 20 MIN: CPT | Mod: PBBFAC,25 | Performed by: INTERNAL MEDICINE

## 2020-11-16 PROCEDURE — 99215 PR OFFICE/OUTPT VISIT, EST, LEVL V, 40-54 MIN: ICD-10-PCS | Mod: S$PBB,,, | Performed by: INTERNAL MEDICINE

## 2020-11-16 PROCEDURE — 99215 OFFICE O/P EST HI 40 MIN: CPT | Mod: S$PBB,,, | Performed by: INTERNAL MEDICINE

## 2020-11-16 PROCEDURE — 93005 ELECTROCARDIOGRAM TRACING: CPT | Mod: PBBFAC | Performed by: INTERNAL MEDICINE

## 2020-11-16 PROCEDURE — 99999 PR PBB SHADOW E&M-EST. PATIENT-LVL III: ICD-10-PCS | Mod: PBBFAC,,, | Performed by: INTERNAL MEDICINE

## 2020-11-16 PROCEDURE — 99999 PR PBB SHADOW E&M-EST. PATIENT-LVL III: CPT | Mod: PBBFAC,,, | Performed by: INTERNAL MEDICINE

## 2020-11-16 RX ORDER — NADOLOL 40 MG/1
40 TABLET ORAL DAILY
Qty: 30 TABLET | Refills: 11 | Status: SHIPPED | OUTPATIENT
Start: 2020-11-16 | End: 2020-11-17 | Stop reason: SDUPTHER

## 2020-11-16 NOTE — PROGRESS NOTES
Subjective:     HPI    I had the pleasure of seeing Ivy Salgado in follow-up for her history of palpitations. 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 Associated symptoms include dizziness, presyncope, nausea and diaphoresis. Episodes would last 15-20 minutes, with gradual resolution.     Cardiac testing has included a 30 day event monitor (4/2018) which showed her episodes correlated with sinus tachycardia. A 48 hour Holter monitor (4/2018) showed sinus rhythm with an average HR of 87 bpm. A cardiac MRI in 9/2018 showed a structurally normal heart. An echo done in 3/2020 also showed a structurally normal heart. She has also had a work-up for pheochromocytoma in the past, which was negative.    Ms. Salgado was placed on toprol XL 25 mg daily in 2018, and instructed to maintain adequate hydration. She also stopped Entyvio (weekly infusion, which she feels precipitated her symptoms). She did not feel toprol helped, so she stopped this med.    Ms. Salgado was restarted on Entyvio 2.5 weeks ago and had several episodes in the week following the infusion. She declined her most recent infusion.    My interpretation of today's ECG is sinus tachycardia at 101 bpm with a QTc of 583 ms.    Review of Systems   Constitution: Positive for malaise/fatigue. Negative for decreased appetite, weight gain and weight loss.   HENT: Negative for sore throat.    Eyes: Negative for blurred vision.   Cardiovascular: Positive for near-syncope and palpitations. Negative for chest pain, dyspnea on exertion, irregular heartbeat, leg swelling, orthopnea, paroxysmal nocturnal dyspnea and syncope.   Respiratory: Negative for shortness of breath.    Skin: Negative for rash.   Musculoskeletal: Negative for  arthritis.   Gastrointestinal: Negative for abdominal pain.   Neurological: Negative for focal weakness.   Psychiatric/Behavioral: Negative for altered mental status.        Objective:    Physical Exam   Constitutional: She is oriented to person, place, and time. She appears well-developed and well-nourished. No distress.   HENT:   Head: Normocephalic and atraumatic.   Mouth/Throat: Oropharynx is clear and moist.   Eyes: Pupils are equal, round, and reactive to light. Conjunctivae are normal. No scleral icterus.   Neck: Normal range of motion. Neck supple. No JVD present. No thyromegaly present.   Cardiovascular: Normal rate, regular rhythm, normal heart sounds and intact distal pulses. Exam reveals no gallop and no friction rub.   No murmur heard.  Pulmonary/Chest: Effort normal and breath sounds normal. No respiratory distress. She has no rales.   Abdominal: Soft. Bowel sounds are normal. She exhibits no distension.   Musculoskeletal:         General: No edema.   Neurological: She is alert and oriented to person, place, and time.   Skin: Skin is warm and dry.   Psychiatric: She has a normal mood and affect. Her behavior is normal.   Vitals reviewed.        Assessment:       1. Palpitations    2. Syncope, unspecified syncope type         Plan:       In summary, Ivy Salgado is a 40 year old female with a history of palpitations associated with sinus tachycardia. I reassured Ms. Salgado that based on past work-ups her arrhythmias are certainly due to sinus tachycardia. I explained to her that sinus tachycardia is not associated with morbidity or mortality.    The plan is to start nadolol 40 mg daily for her sinus tachycardia and also for her prolonged QT. She will see me again in 3 months.    I have also asked her to follow-up with her GI doc to discuss alternatives to Entyvio.    Thank you for allowing me to participate in the care of this patient. Please do not hesitate to call me with any questions or  concerns.

## 2020-11-16 NOTE — LETTER
November 16, 2020      Kalia Astorga MD  [address]  [facility] [address]           [id number]  [address]  [place] [address]  Phone: [phone]  Fax: [phone]          Patient: Ivy Salgado   MR Number: [id number]   YOB: 1980   Date of Visit: 11/16/2020       Dear Dr. Kalia Astorga:    Thank you for referring Ivy Salgado to me for evaluation. Attached you will find relevant portions of my assessment and plan of care.    If you have questions, please do not hesitate to call me. I look forward to following Ivy Salgado along with you.    Sincerely,    Parth Monsalve MD    Enclosure  CC:  No Recipients    If you would like to receive this communication electronically, please contact [email or web address] or [phone] to request more information on [email or web address] Link access.    For providers and/or their staff who would like to refer a patient to [facility], please contact us through our one-stop-shop provider referral line, [facility], at [phone].    If you feel you have received this communication in error or would no longer like to receive these types of communications, please e-mail [email or web address]

## 2020-11-17 RX ORDER — NADOLOL 40 MG/1
40 TABLET ORAL DAILY
Qty: 30 TABLET | Refills: 11 | Status: SHIPPED | OUTPATIENT
Start: 2020-11-17 | End: 2020-11-23 | Stop reason: SDUPTHER

## 2020-11-23 DIAGNOSIS — F41.9 ANXIETY: ICD-10-CM

## 2020-11-23 RX ORDER — ZOLPIDEM TARTRATE 10 MG/1
TABLET ORAL
Qty: 30 TABLET | Refills: 1 | Status: SHIPPED | OUTPATIENT
Start: 2020-11-23 | End: 2021-01-22 | Stop reason: SDUPTHER

## 2020-11-23 RX ORDER — NADOLOL 40 MG/1
40 TABLET ORAL DAILY
Qty: 30 TABLET | Refills: 11 | Status: SHIPPED | OUTPATIENT
Start: 2020-11-23 | End: 2020-12-01 | Stop reason: SDUPTHER

## 2020-11-25 ENCOUNTER — PATIENT MESSAGE (OUTPATIENT)
Dept: ELECTROPHYSIOLOGY | Facility: CLINIC | Age: 40
End: 2020-11-25

## 2020-11-30 ENCOUNTER — TELEPHONE (OUTPATIENT)
Dept: ELECTROPHYSIOLOGY | Facility: CLINIC | Age: 40
End: 2020-11-30

## 2020-11-30 ENCOUNTER — PATIENT MESSAGE (OUTPATIENT)
Dept: ELECTROPHYSIOLOGY | Facility: CLINIC | Age: 40
End: 2020-11-30

## 2020-11-30 NOTE — TELEPHONE ENCOUNTER
I let pt know have sent message to Nurse regarding Meds----- Message from Kailee Sun sent at 11/30/2020 11:54 AM CST -----  Regarding: nadoloL (CORGARD) 40 MG tablet  Pt states that her Pharmacy has been trying to get PA on nadoloL (CORGARD) 40 MG tablet and our office just keeps faxing the request back with no auth.  Pt would like to discuss this issue and can be reached at [phone].    Thank you

## 2020-12-01 ENCOUNTER — TELEPHONE (OUTPATIENT)
Dept: ELECTROPHYSIOLOGY | Facility: CLINIC | Age: 40
End: 2020-12-01

## 2020-12-01 RX ORDER — NADOLOL 40 MG/1
40 TABLET ORAL DAILY
Qty: 30 TABLET | Refills: 11 | Status: SHIPPED | OUTPATIENT
Start: 2020-12-01 | End: 2021-12-22 | Stop reason: SDUPTHER

## 2020-12-01 NOTE — TELEPHONE ENCOUNTER
Left message for the pt to call me about PA on Nadalol. Called [facility] and they have no idea which insurance is current for the pt. Left message for pt to return call to verify which insurance she uses for prescription coverage.    Pt returned call. Verified insurance coverage. Pt has Part A, B, and D. Advised pt of conversion with [facility] and stated I would send prescription to [facility] pharmacy to acquire PA since [facility] could not provide any information. She voiced understanding

## 2020-12-03 ENCOUNTER — PATIENT MESSAGE (OUTPATIENT)
Dept: ELECTROPHYSIOLOGY | Facility: CLINIC | Age: 40
End: 2020-12-03

## 2020-12-03 RX ORDER — PROPRANOLOL HYDROCHLORIDE 80 MG/1
80 CAPSULE, EXTENDED RELEASE ORAL DAILY
Qty: 30 CAPSULE | Refills: 11 | Status: SHIPPED | OUTPATIENT
Start: 2020-12-03 | End: 2020-12-03

## 2020-12-17 ENCOUNTER — PATIENT MESSAGE (OUTPATIENT)
Dept: OBSTETRICS AND GYNECOLOGY | Facility: CLINIC | Age: 40
End: 2020-12-17

## 2020-12-21 ENCOUNTER — IMMUNIZATION (OUTPATIENT)
Dept: INTERNAL MEDICINE | Facility: CLINIC | Age: 40
End: 2020-12-21
Payer: MEDICARE

## 2020-12-21 DIAGNOSIS — Z23 NEED FOR VACCINATION: ICD-10-CM

## 2020-12-21 PROCEDURE — 0001A COVID-19, MRNA, LNP-S, PF, 30 MCG/0.3 ML DOSE VACCINE: CPT | Mod: CV19,,, | Performed by: INTERNAL MEDICINE

## 2020-12-21 PROCEDURE — 91300 COVID-19, MRNA, LNP-S, PF, 30 MCG/0.3 ML DOSE VACCINE: ICD-10-PCS | Mod: ,,, | Performed by: INTERNAL MEDICINE

## 2020-12-21 PROCEDURE — 91300 COVID-19, MRNA, LNP-S, PF, 30 MCG/0.3 ML DOSE VACCINE: CPT | Mod: ,,, | Performed by: INTERNAL MEDICINE

## 2020-12-21 PROCEDURE — 0001A COVID-19, MRNA, LNP-S, PF, 30 MCG/0.3 ML DOSE VACCINE: ICD-10-PCS | Mod: CV19,,, | Performed by: INTERNAL MEDICINE

## 2020-12-22 ENCOUNTER — PATIENT MESSAGE (OUTPATIENT)
Dept: GASTROENTEROLOGY | Facility: CLINIC | Age: 40
End: 2020-12-22

## 2020-12-22 ENCOUNTER — PATIENT MESSAGE (OUTPATIENT)
Dept: INTERNAL MEDICINE | Facility: CLINIC | Age: 40
End: 2020-12-22

## 2020-12-22 ENCOUNTER — HOSPITAL ENCOUNTER (OUTPATIENT)
Dept: RADIOLOGY | Facility: OTHER | Age: 40
Discharge: HOME | End: 2020-12-22
Attending: PHYSICIAN ASSISTANT
Payer: MEDICARE

## 2020-12-22 DIAGNOSIS — G89.29 CHRONIC LEFT SHOULDER PAIN: ICD-10-CM

## 2020-12-22 DIAGNOSIS — M25.512 CHRONIC LEFT SHOULDER PAIN: ICD-10-CM

## 2020-12-22 PROCEDURE — 73221 MRI SHOULDER WITHOUT CONTRAST LEFT: ICD-10-PCS | Mod: 26,LT,, | Performed by: RADIOLOGY

## 2020-12-22 PROCEDURE — 73221 MRI JOINT UPR EXTREM W/O DYE: CPT | Mod: TC,LT

## 2020-12-22 PROCEDURE — 73221 MRI JOINT UPR EXTREM W/O DYE: CPT | Mod: 26,LT,, | Performed by: RADIOLOGY

## 2020-12-29 ENCOUNTER — INFUSION (OUTPATIENT)
Dept: INFECTIOUS DISEASES | Facility: HOSPITAL | Age: 40
End: 2020-12-29
Attending: INTERNAL MEDICINE
Payer: MEDICARE

## 2020-12-29 DIAGNOSIS — I87.8 POOR VENOUS ACCESS: Primary | ICD-10-CM

## 2020-12-29 PROCEDURE — 25000003 PHARM REV CODE 250: Performed by: INTERNAL MEDICINE

## 2020-12-29 PROCEDURE — A4216 STERILE WATER/SALINE, 10 ML: HCPCS | Performed by: INTERNAL MEDICINE

## 2020-12-29 PROCEDURE — 63600175 PHARM REV CODE 636 W HCPCS: Performed by: INTERNAL MEDICINE

## 2020-12-29 PROCEDURE — 96523 IRRIG DRUG DELIVERY DEVICE: CPT

## 2020-12-29 RX ORDER — SODIUM CHLORIDE 0.9 % (FLUSH) 0.9 %
10 SYRINGE (ML) INJECTION
Status: CANCELLED | OUTPATIENT
Start: 2020-12-29

## 2020-12-29 RX ORDER — HEPARIN 100 UNIT/ML
500 SYRINGE INTRAVENOUS
Status: CANCELLED | OUTPATIENT
Start: 2020-12-29

## 2020-12-29 RX ORDER — SODIUM CHLORIDE 0.9 % (FLUSH) 0.9 %
10 SYRINGE (ML) INJECTION
Status: DISCONTINUED | OUTPATIENT
Start: 2020-12-29 | End: 2020-12-29 | Stop reason: HOSPADM

## 2020-12-29 RX ORDER — HEPARIN 100 UNIT/ML
500 SYRINGE INTRAVENOUS
Status: DISCONTINUED | OUTPATIENT
Start: 2020-12-29 | End: 2020-12-29 | Stop reason: HOSPADM

## 2020-12-29 RX ADMIN — Medication 500 UNITS: at 03:12

## 2020-12-29 RX ADMIN — SODIUM CHLORIDE, PRESERVATIVE FREE 10 ML: 5 INJECTION INTRAVENOUS at 03:12

## 2020-12-29 NOTE — PROGRESS NOTES
"Patient arrived for LCW port flush.  Port accessed with 20 G 1" power port montes needle, blood return obtained, flushed w/ 10 cc sterile saline and heparin. Patient tolerated well.   "

## 2021-01-03 ENCOUNTER — TELEPHONE (OUTPATIENT)
Dept: SPORTS MEDICINE | Facility: CLINIC | Age: 41
End: 2021-01-03

## 2021-01-03 DIAGNOSIS — G89.29 CHRONIC LEFT SHOULDER PAIN: ICD-10-CM

## 2021-01-03 DIAGNOSIS — M75.42 IMPINGEMENT SYNDROME OF LEFT SHOULDER: ICD-10-CM

## 2021-01-03 DIAGNOSIS — G25.89 SCAPULAR DYSKINESIS: Primary | ICD-10-CM

## 2021-01-03 DIAGNOSIS — M25.512 CHRONIC LEFT SHOULDER PAIN: ICD-10-CM

## 2021-01-03 DIAGNOSIS — M87.022 AVASCULAR NECROSIS OF LEFT HUMERAL HEAD: ICD-10-CM

## 2021-01-11 DIAGNOSIS — F41.1 GENERALIZED ANXIETY DISORDER: ICD-10-CM

## 2021-01-11 RX ORDER — CLONAZEPAM 1 MG/1
TABLET ORAL
Qty: 90 TABLET | Refills: 0 | Status: SHIPPED | OUTPATIENT
Start: 2021-01-11 | End: 2021-02-11 | Stop reason: SDUPTHER

## 2021-01-13 ENCOUNTER — IMMUNIZATION (OUTPATIENT)
Dept: INTERNAL MEDICINE | Facility: CLINIC | Age: 41
End: 2021-01-13
Payer: MEDICARE

## 2021-01-13 DIAGNOSIS — Z23 NEED FOR VACCINATION: ICD-10-CM

## 2021-01-13 PROCEDURE — 0002A COVID-19, MRNA, LNP-S, PF, 30 MCG/0.3 ML DOSE VACCINE: CPT | Mod: PBBFAC

## 2021-01-13 PROCEDURE — 91300 COVID-19, MRNA, LNP-S, PF, 30 MCG/0.3 ML DOSE VACCINE: CPT | Mod: PBBFAC

## 2021-01-14 ENCOUNTER — PATIENT MESSAGE (OUTPATIENT)
Dept: OBSTETRICS AND GYNECOLOGY | Facility: CLINIC | Age: 41
End: 2021-01-14

## 2021-01-14 RX ORDER — FLUCONAZOLE 200 MG/1
200 TABLET ORAL ONCE
Qty: 1 TABLET | Refills: 0 | Status: SHIPPED | OUTPATIENT
Start: 2021-01-14 | End: 2021-03-27

## 2021-01-15 ENCOUNTER — INFUSION (OUTPATIENT)
Dept: INFECTIOUS DISEASES | Facility: HOSPITAL | Age: 41
End: 2021-01-15
Attending: INTERNAL MEDICINE
Payer: MEDICARE

## 2021-01-15 VITALS
RESPIRATION RATE: 18 BRPM | DIASTOLIC BLOOD PRESSURE: 80 MMHG | OXYGEN SATURATION: 100 % | SYSTOLIC BLOOD PRESSURE: 131 MMHG | HEIGHT: 61 IN | BODY MASS INDEX: 23.74 KG/M2 | TEMPERATURE: 98 F | WEIGHT: 125.75 LBS | HEART RATE: 64 BPM

## 2021-01-15 DIAGNOSIS — I49.8 OTHER SPECIFIED CARDIAC ARRHYTHMIAS: Primary | ICD-10-CM

## 2021-01-15 DIAGNOSIS — K50.00 CROHN'S DISEASE OF SMALL INTESTINE WITHOUT COMPLICATION: Primary | ICD-10-CM

## 2021-01-15 PROCEDURE — 25000003 PHARM REV CODE 250: Performed by: INTERNAL MEDICINE

## 2021-01-15 PROCEDURE — 96360 HYDRATION IV INFUSION INIT: CPT

## 2021-01-15 RX ORDER — HEPARIN 100 UNIT/ML
500 SYRINGE INTRAVENOUS
Status: CANCELLED | OUTPATIENT
Start: 2021-01-22

## 2021-01-15 RX ORDER — METHYLPREDNISOLONE SOD SUCC 125 MG
40 VIAL (EA) INJECTION
Status: CANCELLED | OUTPATIENT
Start: 2021-01-22

## 2021-01-15 RX ORDER — HEPARIN 100 UNIT/ML
500 SYRINGE INTRAVENOUS
Status: DISCONTINUED | OUTPATIENT
Start: 2021-01-15 | End: 2021-01-15 | Stop reason: HOSPADM

## 2021-01-15 RX ORDER — DIPHENHYDRAMINE HYDROCHLORIDE 50 MG/ML
25 INJECTION INTRAMUSCULAR; INTRAVENOUS
Status: CANCELLED | OUTPATIENT
Start: 2021-01-22

## 2021-01-15 RX ORDER — SODIUM CHLORIDE 0.9 % (FLUSH) 0.9 %
10 SYRINGE (ML) INJECTION
Status: CANCELLED | OUTPATIENT
Start: 2021-01-22

## 2021-01-15 RX ORDER — EPINEPHRINE 1 MG/ML
0.3 INJECTION, SOLUTION, CONCENTRATE INTRAVENOUS
Status: CANCELLED | OUTPATIENT
Start: 2021-01-22

## 2021-01-15 RX ORDER — IPRATROPIUM BROMIDE AND ALBUTEROL SULFATE 2.5; .5 MG/3ML; MG/3ML
3 SOLUTION RESPIRATORY (INHALATION)
Status: CANCELLED | OUTPATIENT
Start: 2021-01-22

## 2021-01-15 RX ORDER — ACETAMINOPHEN 325 MG/1
650 TABLET ORAL
Status: CANCELLED | OUTPATIENT
Start: 2021-01-22

## 2021-01-15 RX ORDER — SODIUM CHLORIDE 0.9 % (FLUSH) 0.9 %
10 SYRINGE (ML) INJECTION
Status: DISCONTINUED | OUTPATIENT
Start: 2021-01-15 | End: 2021-01-15 | Stop reason: HOSPADM

## 2021-01-15 RX ADMIN — SODIUM CHLORIDE 1000 ML: 0.9 INJECTION, SOLUTION INTRAVENOUS at 03:01

## 2021-01-22 DIAGNOSIS — F41.9 ANXIETY: ICD-10-CM

## 2021-01-22 RX ORDER — ZOLPIDEM TARTRATE 10 MG/1
TABLET ORAL
Qty: 30 TABLET | Refills: 1 | Status: CANCELLED | OUTPATIENT
Start: 2021-01-22

## 2021-01-22 RX ORDER — ZOLPIDEM TARTRATE 10 MG/1
TABLET ORAL
Qty: 30 TABLET | Refills: 1 | Status: SHIPPED | OUTPATIENT
Start: 2021-01-22 | End: 2021-03-25 | Stop reason: SDUPTHER

## 2021-01-27 ENCOUNTER — OFFICE VISIT (OUTPATIENT)
Dept: PLASTIC SURGERY | Facility: CLINIC | Age: 41
End: 2021-01-27
Payer: MEDICARE

## 2021-01-27 VITALS — SYSTOLIC BLOOD PRESSURE: 121 MMHG | DIASTOLIC BLOOD PRESSURE: 78 MMHG | HEART RATE: 69 BPM

## 2021-01-27 DIAGNOSIS — S01.311A TEAR OF EARLOBE, RIGHT, INITIAL ENCOUNTER: Primary | ICD-10-CM

## 2021-01-27 DIAGNOSIS — S01.312A TEAR OF EARLOBE, LEFT, INITIAL ENCOUNTER: ICD-10-CM

## 2021-01-27 DIAGNOSIS — Z41.1 ENCOUNTER FOR COSMETIC SURGERY: ICD-10-CM

## 2021-01-27 PROCEDURE — 99999 PR PBB SHADOW E&M-EST. PATIENT-LVL III: ICD-10-PCS | Mod: PBBFAC,,, | Performed by: PHYSICIAN ASSISTANT

## 2021-01-27 PROCEDURE — 99213 OFFICE O/P EST LOW 20 MIN: CPT | Mod: PBBFAC | Performed by: PHYSICIAN ASSISTANT

## 2021-01-27 PROCEDURE — 99203 PR OFFICE/OUTPT VISIT, NEW, LEVL III, 30-44 MIN: ICD-10-PCS | Mod: CSM,,, | Performed by: PHYSICIAN ASSISTANT

## 2021-01-27 PROCEDURE — 99999 PR PBB SHADOW E&M-EST. PATIENT-LVL III: CPT | Mod: PBBFAC,,, | Performed by: PHYSICIAN ASSISTANT

## 2021-01-27 PROCEDURE — 99203 OFFICE O/P NEW LOW 30 MIN: CPT | Mod: CSM,,, | Performed by: PHYSICIAN ASSISTANT

## 2021-02-01 ENCOUNTER — INFUSION (OUTPATIENT)
Dept: INFECTIOUS DISEASES | Facility: HOSPITAL | Age: 41
End: 2021-02-01
Attending: INTERNAL MEDICINE
Payer: MEDICARE

## 2021-02-01 VITALS
RESPIRATION RATE: 18 BRPM | BODY MASS INDEX: 24.15 KG/M2 | OXYGEN SATURATION: 100 % | HEART RATE: 68 BPM | DIASTOLIC BLOOD PRESSURE: 67 MMHG | SYSTOLIC BLOOD PRESSURE: 110 MMHG | TEMPERATURE: 98 F | HEIGHT: 61 IN | WEIGHT: 127.88 LBS

## 2021-02-01 DIAGNOSIS — K50.00 CROHN'S DISEASE OF SMALL INTESTINE WITHOUT COMPLICATION: Primary | ICD-10-CM

## 2021-02-01 PROCEDURE — 25000003 PHARM REV CODE 250: Performed by: INTERNAL MEDICINE

## 2021-02-01 PROCEDURE — 96360 HYDRATION IV INFUSION INIT: CPT

## 2021-02-01 PROCEDURE — 96361 HYDRATE IV INFUSION ADD-ON: CPT

## 2021-02-01 RX ORDER — IPRATROPIUM BROMIDE AND ALBUTEROL SULFATE 2.5; .5 MG/3ML; MG/3ML
3 SOLUTION RESPIRATORY (INHALATION)
Status: CANCELLED | OUTPATIENT
Start: 2021-03-15

## 2021-02-01 RX ORDER — HEPARIN 100 UNIT/ML
500 SYRINGE INTRAVENOUS
Status: CANCELLED | OUTPATIENT
Start: 2021-03-15

## 2021-02-01 RX ORDER — SODIUM CHLORIDE 0.9 % (FLUSH) 0.9 %
10 SYRINGE (ML) INJECTION
Status: DISCONTINUED | OUTPATIENT
Start: 2021-02-01 | End: 2021-02-01 | Stop reason: HOSPADM

## 2021-02-01 RX ORDER — DIPHENHYDRAMINE HYDROCHLORIDE 50 MG/ML
25 INJECTION INTRAMUSCULAR; INTRAVENOUS
Status: CANCELLED | OUTPATIENT
Start: 2021-03-15

## 2021-02-01 RX ORDER — EPINEPHRINE 1 MG/ML
0.3 INJECTION, SOLUTION, CONCENTRATE INTRAVENOUS
Status: CANCELLED | OUTPATIENT
Start: 2021-03-15

## 2021-02-01 RX ORDER — ACETAMINOPHEN 325 MG/1
650 TABLET ORAL
Status: CANCELLED | OUTPATIENT
Start: 2021-03-15

## 2021-02-01 RX ORDER — SODIUM CHLORIDE 0.9 % (FLUSH) 0.9 %
10 SYRINGE (ML) INJECTION
Status: CANCELLED | OUTPATIENT
Start: 2021-03-15

## 2021-02-01 RX ORDER — HEPARIN 100 UNIT/ML
500 SYRINGE INTRAVENOUS
Status: DISCONTINUED | OUTPATIENT
Start: 2021-02-01 | End: 2021-02-01 | Stop reason: HOSPADM

## 2021-02-01 RX ORDER — METHYLPREDNISOLONE SOD SUCC 125 MG
40 VIAL (EA) INJECTION
Status: CANCELLED | OUTPATIENT
Start: 2021-03-15

## 2021-02-01 RX ADMIN — SODIUM CHLORIDE 1000 ML: 0.9 INJECTION, SOLUTION INTRAVENOUS at 01:02

## 2021-02-11 DIAGNOSIS — F41.1 GENERALIZED ANXIETY DISORDER: ICD-10-CM

## 2021-02-11 RX ORDER — CLONAZEPAM 1 MG/1
TABLET ORAL
Qty: 90 TABLET | Refills: 0 | Status: SHIPPED | OUTPATIENT
Start: 2021-02-11 | End: 2021-03-11

## 2021-02-19 ENCOUNTER — TELEPHONE (OUTPATIENT)
Dept: ELECTROPHYSIOLOGY | Facility: CLINIC | Age: 41
End: 2021-02-19

## 2021-02-22 ENCOUNTER — HOSPITAL ENCOUNTER (OUTPATIENT)
Dept: CARDIOLOGY | Facility: CLINIC | Age: 41
Discharge: HOME | End: 2021-02-22
Payer: MEDICARE

## 2021-02-22 ENCOUNTER — OFFICE VISIT (OUTPATIENT)
Dept: ELECTROPHYSIOLOGY | Facility: CLINIC | Age: 41
End: 2021-02-22
Payer: MEDICARE

## 2021-02-22 ENCOUNTER — PATIENT MESSAGE (OUTPATIENT)
Dept: UROLOGY | Facility: CLINIC | Age: 41
End: 2021-02-22

## 2021-02-22 VITALS
HEIGHT: 61 IN | SYSTOLIC BLOOD PRESSURE: 128 MMHG | BODY MASS INDEX: 24.35 KG/M2 | DIASTOLIC BLOOD PRESSURE: 82 MMHG | HEART RATE: 60 BPM | WEIGHT: 129 LBS

## 2021-02-22 DIAGNOSIS — R00.2 PALPITATIONS: ICD-10-CM

## 2021-02-22 DIAGNOSIS — Z87.898 HISTORY OF PROLONGED Q-T INTERVAL ON ECG: Primary | ICD-10-CM

## 2021-02-22 DIAGNOSIS — I49.8 OTHER SPECIFIED CARDIAC ARRHYTHMIAS: ICD-10-CM

## 2021-02-22 DIAGNOSIS — R00.0 SINUS TACHYCARDIA: Primary | ICD-10-CM

## 2021-02-22 DIAGNOSIS — K50.00 CROHN'S DISEASE OF SMALL INTESTINE WITHOUT COMPLICATION: Chronic | ICD-10-CM

## 2021-02-22 DIAGNOSIS — Z87.898 HISTORY OF PROLONGED Q-T INTERVAL ON ECG: ICD-10-CM

## 2021-02-22 DIAGNOSIS — R39.9 UTI SYMPTOMS: Primary | ICD-10-CM

## 2021-02-22 PROCEDURE — 99213 OFFICE O/P EST LOW 20 MIN: CPT | Mod: PBBFAC,25 | Performed by: INTERNAL MEDICINE

## 2021-02-22 PROCEDURE — 93005 ELECTROCARDIOGRAM TRACING: CPT | Mod: PBBFAC | Performed by: INTERNAL MEDICINE

## 2021-02-22 PROCEDURE — 99214 PR OFFICE/OUTPT VISIT, EST, LEVL IV, 30-39 MIN: ICD-10-PCS | Mod: S$PBB,,, | Performed by: INTERNAL MEDICINE

## 2021-02-22 PROCEDURE — 93010 RHYTHM STRIP: ICD-10-PCS | Mod: S$PBB,,, | Performed by: INTERNAL MEDICINE

## 2021-02-22 PROCEDURE — 99214 OFFICE O/P EST MOD 30 MIN: CPT | Mod: S$PBB,,, | Performed by: INTERNAL MEDICINE

## 2021-02-22 PROCEDURE — 99999 PR PBB SHADOW E&M-EST. PATIENT-LVL III: CPT | Mod: PBBFAC,,, | Performed by: INTERNAL MEDICINE

## 2021-02-22 PROCEDURE — 93010 ELECTROCARDIOGRAM REPORT: CPT | Mod: S$PBB,,, | Performed by: INTERNAL MEDICINE

## 2021-02-22 PROCEDURE — 99999 PR PBB SHADOW E&M-EST. PATIENT-LVL III: ICD-10-PCS | Mod: PBBFAC,,, | Performed by: INTERNAL MEDICINE

## 2021-02-23 ENCOUNTER — PATIENT MESSAGE (OUTPATIENT)
Dept: UROLOGY | Facility: CLINIC | Age: 41
End: 2021-02-23

## 2021-02-24 ENCOUNTER — PATIENT MESSAGE (OUTPATIENT)
Dept: ELECTROPHYSIOLOGY | Facility: CLINIC | Age: 41
End: 2021-02-24

## 2021-02-26 ENCOUNTER — INFUSION (OUTPATIENT)
Dept: INFECTIOUS DISEASES | Facility: HOSPITAL | Age: 41
End: 2021-02-26
Attending: INTERNAL MEDICINE
Payer: MEDICARE

## 2021-02-26 VITALS
HEART RATE: 84 BPM | OXYGEN SATURATION: 100 % | TEMPERATURE: 99 F | HEIGHT: 61 IN | SYSTOLIC BLOOD PRESSURE: 117 MMHG | RESPIRATION RATE: 18 BRPM | WEIGHT: 129 LBS | BODY MASS INDEX: 24.35 KG/M2 | DIASTOLIC BLOOD PRESSURE: 61 MMHG

## 2021-02-26 DIAGNOSIS — K50.00 CROHN'S DISEASE OF SMALL INTESTINE WITHOUT COMPLICATION: Primary | ICD-10-CM

## 2021-02-26 PROCEDURE — 96360 HYDRATION IV INFUSION INIT: CPT

## 2021-02-26 PROCEDURE — 25000003 PHARM REV CODE 250: Performed by: INTERNAL MEDICINE

## 2021-02-26 RX ORDER — SODIUM CHLORIDE 0.9 % (FLUSH) 0.9 %
10 SYRINGE (ML) INJECTION
Status: DISCONTINUED | OUTPATIENT
Start: 2021-02-26 | End: 2021-02-26 | Stop reason: HOSPADM

## 2021-02-26 RX ORDER — HEPARIN 100 UNIT/ML
500 SYRINGE INTRAVENOUS
Status: CANCELLED | OUTPATIENT
Start: 2021-03-15

## 2021-02-26 RX ORDER — METHYLPREDNISOLONE SOD SUCC 125 MG
40 VIAL (EA) INJECTION
Status: CANCELLED | OUTPATIENT
Start: 2021-03-15

## 2021-02-26 RX ORDER — EPINEPHRINE 1 MG/ML
0.3 INJECTION, SOLUTION, CONCENTRATE INTRAVENOUS
Status: CANCELLED | OUTPATIENT
Start: 2021-03-15

## 2021-02-26 RX ORDER — SODIUM CHLORIDE 0.9 % (FLUSH) 0.9 %
10 SYRINGE (ML) INJECTION
Status: CANCELLED | OUTPATIENT
Start: 2021-03-15

## 2021-02-26 RX ORDER — ACETAMINOPHEN 325 MG/1
650 TABLET ORAL
Status: CANCELLED | OUTPATIENT
Start: 2021-03-15

## 2021-02-26 RX ORDER — HEPARIN 100 UNIT/ML
500 SYRINGE INTRAVENOUS
Status: DISCONTINUED | OUTPATIENT
Start: 2021-02-26 | End: 2021-02-26 | Stop reason: HOSPADM

## 2021-02-26 RX ORDER — IPRATROPIUM BROMIDE AND ALBUTEROL SULFATE 2.5; .5 MG/3ML; MG/3ML
3 SOLUTION RESPIRATORY (INHALATION)
Status: CANCELLED | OUTPATIENT
Start: 2021-03-15

## 2021-02-26 RX ORDER — DIPHENHYDRAMINE HYDROCHLORIDE 50 MG/ML
25 INJECTION INTRAMUSCULAR; INTRAVENOUS
Status: CANCELLED | OUTPATIENT
Start: 2021-03-15

## 2021-02-26 RX ADMIN — SODIUM CHLORIDE 1000 ML: 0.9 INJECTION, SOLUTION INTRAVENOUS at 03:02

## 2021-03-02 ENCOUNTER — PATIENT MESSAGE (OUTPATIENT)
Dept: UROLOGY | Facility: CLINIC | Age: 41
End: 2021-03-02

## 2021-03-02 RX ORDER — NITROFURANTOIN 25; 75 MG/1; MG/1
100 CAPSULE ORAL 2 TIMES DAILY
Qty: 14 CAPSULE | Refills: 0 | Status: SHIPPED | OUTPATIENT
Start: 2021-03-02 | End: 2021-03-09

## 2021-03-03 ENCOUNTER — PATIENT MESSAGE (OUTPATIENT)
Dept: OBSTETRICS AND GYNECOLOGY | Facility: CLINIC | Age: 41
End: 2021-03-03

## 2021-03-03 ENCOUNTER — PATIENT MESSAGE (OUTPATIENT)
Dept: ELECTROPHYSIOLOGY | Facility: CLINIC | Age: 41
End: 2021-03-03

## 2021-03-03 ENCOUNTER — PATIENT MESSAGE (OUTPATIENT)
Dept: GASTROENTEROLOGY | Facility: CLINIC | Age: 41
End: 2021-03-03

## 2021-03-05 ENCOUNTER — TELEPHONE (OUTPATIENT)
Dept: PHARMACY | Facility: CLINIC | Age: 41
End: 2021-03-05

## 2021-03-12 ENCOUNTER — PATIENT MESSAGE (OUTPATIENT)
Dept: GASTROENTEROLOGY | Facility: CLINIC | Age: 41
End: 2021-03-12

## 2021-03-15 DIAGNOSIS — K50.00 CROHN'S DISEASE OF SMALL INTESTINE WITHOUT COMPLICATION: ICD-10-CM

## 2021-03-16 ENCOUNTER — PATIENT MESSAGE (OUTPATIENT)
Dept: INTERNAL MEDICINE | Facility: CLINIC | Age: 41
End: 2021-03-16

## 2021-03-16 DIAGNOSIS — Z13.6 ENCOUNTER FOR LIPID SCREENING FOR CARDIOVASCULAR DISEASE: Primary | ICD-10-CM

## 2021-03-16 DIAGNOSIS — K50.018 CROHN'S DISEASE OF SMALL INTESTINE WITH OTHER COMPLICATION: ICD-10-CM

## 2021-03-16 DIAGNOSIS — Z13.220 ENCOUNTER FOR LIPID SCREENING FOR CARDIOVASCULAR DISEASE: Primary | ICD-10-CM

## 2021-03-22 ENCOUNTER — OFFICE VISIT (OUTPATIENT)
Dept: OBSTETRICS AND GYNECOLOGY | Facility: CLINIC | Age: 41
End: 2021-03-22
Payer: MEDICARE

## 2021-03-22 VITALS
BODY MASS INDEX: 24.64 KG/M2 | WEIGHT: 130.5 LBS | SYSTOLIC BLOOD PRESSURE: 122 MMHG | DIASTOLIC BLOOD PRESSURE: 64 MMHG | HEIGHT: 61 IN

## 2021-03-22 DIAGNOSIS — Z01.419 GYNECOLOGIC EXAM NORMAL: Primary | ICD-10-CM

## 2021-03-22 DIAGNOSIS — Z12.4 PAP SMEAR FOR CERVICAL CANCER SCREENING: ICD-10-CM

## 2021-03-22 PROCEDURE — 99999 PR PBB SHADOW E&M-EST. PATIENT-LVL IV: CPT | Mod: PBBFAC,,, | Performed by: SPECIALIST

## 2021-03-22 PROCEDURE — 99999 PR PBB SHADOW E&M-EST. PATIENT-LVL IV: ICD-10-PCS | Mod: PBBFAC,,, | Performed by: SPECIALIST

## 2021-03-22 PROCEDURE — G0101 PR CA SCREEN;PELVIC/BREAST EXAM: ICD-10-PCS | Mod: S$PBB,,, | Performed by: SPECIALIST

## 2021-03-22 PROCEDURE — 88175 CYTOPATH C/V AUTO FLUID REDO: CPT | Performed by: SPECIALIST

## 2021-03-22 PROCEDURE — G0101 CA SCREEN;PELVIC/BREAST EXAM: HCPCS | Mod: S$PBB,,, | Performed by: SPECIALIST

## 2021-03-22 PROCEDURE — 99214 OFFICE O/P EST MOD 30 MIN: CPT | Mod: PBBFAC,PN | Performed by: SPECIALIST

## 2021-03-22 RX ORDER — VALACYCLOVIR HYDROCHLORIDE 500 MG/1
500 TABLET, FILM COATED ORAL DAILY
Qty: 90 TABLET | Refills: 3 | Status: SHIPPED | OUTPATIENT
Start: 2021-03-22 | End: 2022-04-11

## 2021-03-23 ENCOUNTER — PATIENT MESSAGE (OUTPATIENT)
Dept: DERMATOLOGY | Facility: CLINIC | Age: 41
End: 2021-03-23

## 2021-03-24 ENCOUNTER — INFUSION (OUTPATIENT)
Dept: INFECTIOUS DISEASES | Facility: HOSPITAL | Age: 41
End: 2021-03-24
Attending: INTERNAL MEDICINE
Payer: MEDICARE

## 2021-03-24 DIAGNOSIS — K50.018 CROHN'S DISEASE OF SMALL INTESTINE WITH OTHER COMPLICATION: ICD-10-CM

## 2021-03-24 DIAGNOSIS — Z13.220 ENCOUNTER FOR LIPID SCREENING FOR CARDIOVASCULAR DISEASE: ICD-10-CM

## 2021-03-24 DIAGNOSIS — Z13.6 ENCOUNTER FOR LIPID SCREENING FOR CARDIOVASCULAR DISEASE: ICD-10-CM

## 2021-03-24 LAB
ALBUMIN SERPL BCP-MCNC: 3.8 G/DL (ref 3.5–5.2)
ALP SERPL-CCNC: 97 U/L (ref 55–135)
ALT SERPL W/O P-5'-P-CCNC: 34 U/L (ref 10–44)
ANION GAP SERPL CALC-SCNC: 8 MMOL/L (ref 8–16)
AST SERPL-CCNC: 30 U/L (ref 10–40)
BASOPHILS # BLD AUTO: 0.03 K/UL (ref 0–0.2)
BASOPHILS NFR BLD: 0.4 % (ref 0–1.9)
BILIRUB SERPL-MCNC: 0.5 MG/DL (ref 0.1–1)
BUN SERPL-MCNC: 11 MG/DL (ref 6–20)
CALCIUM SERPL-MCNC: 8.8 MG/DL (ref 8.7–10.5)
CHLORIDE SERPL-SCNC: 106 MMOL/L (ref 95–110)
CHOLEST SERPL-MCNC: 214 MG/DL (ref 120–199)
CHOLEST/HDLC SERPL: 4.5 {RATIO} (ref 2–5)
CO2 SERPL-SCNC: 23 MMOL/L (ref 23–29)
CREAT SERPL-MCNC: 0.8 MG/DL (ref 0.5–1.4)
CRP SERPL-MCNC: 2.5 MG/L (ref 0–8.2)
DIFFERENTIAL METHOD: NORMAL
EOSINOPHIL # BLD AUTO: 0.1 K/UL (ref 0–0.5)
EOSINOPHIL NFR BLD: 0.8 % (ref 0–8)
ERYTHROCYTE [DISTWIDTH] IN BLOOD BY AUTOMATED COUNT: 13.4 % (ref 11.5–14.5)
ERYTHROCYTE [SEDIMENTATION RATE] IN BLOOD BY WESTERGREN METHOD: 55 MM/HR (ref 0–36)
EST. GFR  (AFRICAN AMERICAN): >60 ML/MIN/1.73 M^2
EST. GFR  (NON AFRICAN AMERICAN): >60 ML/MIN/1.73 M^2
GLUCOSE SERPL-MCNC: 96 MG/DL (ref 70–110)
HCT VFR BLD AUTO: 40.2 % (ref 37–48.5)
HDLC SERPL-MCNC: 48 MG/DL (ref 40–75)
HDLC SERPL: 22.4 % (ref 20–50)
HGB BLD-MCNC: 13.4 G/DL (ref 12–16)
IMM GRANULOCYTES # BLD AUTO: 0.01 K/UL (ref 0–0.04)
IMM GRANULOCYTES NFR BLD AUTO: 0.1 % (ref 0–0.5)
LDLC SERPL CALC-MCNC: 123.8 MG/DL (ref 63–159)
LYMPHOCYTES # BLD AUTO: 2.6 K/UL (ref 1–4.8)
LYMPHOCYTES NFR BLD: 34.5 % (ref 18–48)
MCH RBC QN AUTO: 31 PG (ref 27–31)
MCHC RBC AUTO-ENTMCNC: 33.3 G/DL (ref 32–36)
MCV RBC AUTO: 93 FL (ref 82–98)
MONOCYTES # BLD AUTO: 0.8 K/UL (ref 0.3–1)
MONOCYTES NFR BLD: 10.2 % (ref 4–15)
NEUTROPHILS # BLD AUTO: 4 K/UL (ref 1.8–7.7)
NEUTROPHILS NFR BLD: 54 % (ref 38–73)
NONHDLC SERPL-MCNC: 166 MG/DL
NRBC BLD-RTO: 0 /100 WBC
PLATELET # BLD AUTO: 321 K/UL (ref 150–350)
PMV BLD AUTO: 10.5 FL (ref 9.2–12.9)
POTASSIUM SERPL-SCNC: 3.9 MMOL/L (ref 3.5–5.1)
PROT SERPL-MCNC: 7.7 G/DL (ref 6–8.4)
RBC # BLD AUTO: 4.32 M/UL (ref 4–5.4)
SODIUM SERPL-SCNC: 137 MMOL/L (ref 136–145)
TRIGL SERPL-MCNC: 211 MG/DL (ref 30–150)
WBC # BLD AUTO: 7.46 K/UL (ref 3.9–12.7)

## 2021-03-24 PROCEDURE — 86140 C-REACTIVE PROTEIN: CPT | Performed by: INTERNAL MEDICINE

## 2021-03-24 PROCEDURE — 80053 COMPREHEN METABOLIC PANEL: CPT | Performed by: INTERNAL MEDICINE

## 2021-03-24 PROCEDURE — 85652 RBC SED RATE AUTOMATED: CPT | Performed by: INTERNAL MEDICINE

## 2021-03-24 PROCEDURE — 36591 DRAW BLOOD OFF VENOUS DEVICE: CPT

## 2021-03-24 PROCEDURE — 36415 COLL VENOUS BLD VENIPUNCTURE: CPT | Performed by: INTERNAL MEDICINE

## 2021-03-24 PROCEDURE — 80061 LIPID PANEL: CPT | Performed by: INTERNAL MEDICINE

## 2021-03-24 PROCEDURE — 85025 COMPLETE CBC W/AUTO DIFF WBC: CPT | Performed by: INTERNAL MEDICINE

## 2021-03-25 ENCOUNTER — OFFICE VISIT (OUTPATIENT)
Dept: INTERNAL MEDICINE | Facility: CLINIC | Age: 41
End: 2021-03-25
Payer: MEDICARE

## 2021-03-25 VITALS
WEIGHT: 130.31 LBS | HEART RATE: 68 BPM | DIASTOLIC BLOOD PRESSURE: 70 MMHG | BODY MASS INDEX: 24.6 KG/M2 | HEIGHT: 61 IN | OXYGEN SATURATION: 99 % | SYSTOLIC BLOOD PRESSURE: 102 MMHG

## 2021-03-25 DIAGNOSIS — Z87.440 HISTORY OF RECURRENT UTI (URINARY TRACT INFECTION): ICD-10-CM

## 2021-03-25 DIAGNOSIS — F41.9 ANXIETY: ICD-10-CM

## 2021-03-25 DIAGNOSIS — K50.019 CROHN'S DISEASE OF SMALL INTESTINE WITH COMPLICATION: Primary | ICD-10-CM

## 2021-03-25 DIAGNOSIS — Z87.898 HISTORY OF PROLONGED Q-T INTERVAL ON ECG: ICD-10-CM

## 2021-03-25 DIAGNOSIS — R00.2 PALPITATIONS: ICD-10-CM

## 2021-03-25 DIAGNOSIS — F41.1 GENERALIZED ANXIETY DISORDER: ICD-10-CM

## 2021-03-25 DIAGNOSIS — K21.9 GASTROESOPHAGEAL REFLUX DISEASE WITHOUT ESOPHAGITIS: ICD-10-CM

## 2021-03-25 DIAGNOSIS — M85.89 OSTEOPENIA OF MULTIPLE SITES: ICD-10-CM

## 2021-03-25 DIAGNOSIS — E04.2 MULTIPLE THYROID NODULES: ICD-10-CM

## 2021-03-25 DIAGNOSIS — F11.90 CHRONIC, CONTINUOUS USE OF OPIOIDS: ICD-10-CM

## 2021-03-25 DIAGNOSIS — G40.219 COMPLEX PARTIAL EPILEPSY WITH GENERALIZATION AND WITH INTRACTABLE EPILEPSY: ICD-10-CM

## 2021-03-25 DIAGNOSIS — F32.0 CURRENT MILD EPISODE OF MAJOR DEPRESSIVE DISORDER WITHOUT PRIOR EPISODE: ICD-10-CM

## 2021-03-25 DIAGNOSIS — E55.9 VITAMIN D DEFICIENCY: ICD-10-CM

## 2021-03-25 LAB
FINAL PATHOLOGIC DIAGNOSIS: NORMAL
Lab: NORMAL

## 2021-03-25 PROCEDURE — 99214 PR OFFICE/OUTPT VISIT, EST, LEVL IV, 30-39 MIN: ICD-10-PCS | Mod: S$PBB,,, | Performed by: INTERNAL MEDICINE

## 2021-03-25 PROCEDURE — 99214 OFFICE O/P EST MOD 30 MIN: CPT | Mod: S$PBB,,, | Performed by: INTERNAL MEDICINE

## 2021-03-25 PROCEDURE — 99999 PR PBB SHADOW E&M-EST. PATIENT-LVL IV: ICD-10-PCS | Mod: PBBFAC,,, | Performed by: INTERNAL MEDICINE

## 2021-03-25 PROCEDURE — 99214 OFFICE O/P EST MOD 30 MIN: CPT | Mod: PBBFAC | Performed by: INTERNAL MEDICINE

## 2021-03-25 PROCEDURE — 99999 PR PBB SHADOW E&M-EST. PATIENT-LVL IV: CPT | Mod: PBBFAC,,, | Performed by: INTERNAL MEDICINE

## 2021-03-25 RX ORDER — ZOLPIDEM TARTRATE 10 MG/1
TABLET ORAL
Qty: 30 TABLET | Refills: 1 | Status: SHIPPED | OUTPATIENT
Start: 2021-03-25 | End: 2021-06-01

## 2021-04-01 ENCOUNTER — INFUSION (OUTPATIENT)
Dept: INFECTIOUS DISEASES | Facility: HOSPITAL | Age: 41
End: 2021-04-01
Attending: INTERNAL MEDICINE
Payer: MEDICARE

## 2021-04-01 VITALS
RESPIRATION RATE: 18 BRPM | HEIGHT: 61 IN | OXYGEN SATURATION: 98 % | WEIGHT: 130.31 LBS | BODY MASS INDEX: 24.6 KG/M2 | HEART RATE: 64 BPM | SYSTOLIC BLOOD PRESSURE: 119 MMHG | TEMPERATURE: 98 F | DIASTOLIC BLOOD PRESSURE: 58 MMHG

## 2021-04-01 DIAGNOSIS — K50.00 CROHN'S DISEASE OF SMALL INTESTINE WITHOUT COMPLICATION: Primary | ICD-10-CM

## 2021-04-01 PROCEDURE — 25000003 PHARM REV CODE 250: Performed by: INTERNAL MEDICINE

## 2021-04-01 PROCEDURE — 96360 HYDRATION IV INFUSION INIT: CPT

## 2021-04-01 PROCEDURE — 63600175 PHARM REV CODE 636 W HCPCS: Performed by: INTERNAL MEDICINE

## 2021-04-01 RX ORDER — METHYLPREDNISOLONE SOD SUCC 125 MG
40 VIAL (EA) INJECTION
Status: CANCELLED | OUTPATIENT
Start: 2021-05-13

## 2021-04-01 RX ORDER — ACETAMINOPHEN 325 MG/1
650 TABLET ORAL
Status: CANCELLED | OUTPATIENT
Start: 2021-05-13

## 2021-04-01 RX ORDER — HEPARIN 100 UNIT/ML
500 SYRINGE INTRAVENOUS
Status: CANCELLED | OUTPATIENT
Start: 2021-05-13

## 2021-04-01 RX ORDER — DIPHENHYDRAMINE HYDROCHLORIDE 50 MG/ML
25 INJECTION INTRAMUSCULAR; INTRAVENOUS
Status: CANCELLED | OUTPATIENT
Start: 2021-05-13

## 2021-04-01 RX ORDER — HEPARIN 100 UNIT/ML
500 SYRINGE INTRAVENOUS
Status: DISCONTINUED | OUTPATIENT
Start: 2021-04-01 | End: 2021-04-01 | Stop reason: HOSPADM

## 2021-04-01 RX ORDER — SODIUM CHLORIDE 0.9 % (FLUSH) 0.9 %
10 SYRINGE (ML) INJECTION
Status: CANCELLED | OUTPATIENT
Start: 2021-05-13

## 2021-04-01 RX ORDER — SODIUM CHLORIDE 0.9 % (FLUSH) 0.9 %
10 SYRINGE (ML) INJECTION
Status: DISCONTINUED | OUTPATIENT
Start: 2021-04-01 | End: 2021-04-01 | Stop reason: HOSPADM

## 2021-04-01 RX ORDER — IPRATROPIUM BROMIDE AND ALBUTEROL SULFATE 2.5; .5 MG/3ML; MG/3ML
3 SOLUTION RESPIRATORY (INHALATION)
Status: CANCELLED | OUTPATIENT
Start: 2021-05-13

## 2021-04-01 RX ORDER — EPINEPHRINE 1 MG/ML
0.3 INJECTION, SOLUTION, CONCENTRATE INTRAVENOUS
Status: CANCELLED | OUTPATIENT
Start: 2021-05-13

## 2021-04-01 RX ADMIN — HEPARIN SODIUM (PORCINE) LOCK FLUSH IV SOLN 100 UNIT/ML 500 UNITS: 100 SOLUTION at 04:04

## 2021-04-01 RX ADMIN — SODIUM CHLORIDE 1000 ML: 0.9 INJECTION, SOLUTION INTRAVENOUS at 03:04

## 2021-04-05 ENCOUNTER — PATIENT MESSAGE (OUTPATIENT)
Dept: GASTROENTEROLOGY | Facility: CLINIC | Age: 41
End: 2021-04-05

## 2021-04-05 DIAGNOSIS — K50.919 CROHN'S DISEASE WITH COMPLICATION, UNSPECIFIED GASTROINTESTINAL TRACT LOCATION: Primary | ICD-10-CM

## 2021-04-06 ENCOUNTER — PATIENT MESSAGE (OUTPATIENT)
Dept: INTERNAL MEDICINE | Facility: CLINIC | Age: 41
End: 2021-04-06

## 2021-04-06 ENCOUNTER — LAB VISIT (OUTPATIENT)
Dept: LAB | Facility: HOSPITAL | Age: 41
End: 2021-04-06
Attending: INTERNAL MEDICINE
Payer: MEDICARE

## 2021-04-06 DIAGNOSIS — Z11.1 SCREENING-PULMONARY TB: ICD-10-CM

## 2021-04-06 DIAGNOSIS — Z11.1 SCREENING-PULMONARY TB: Primary | ICD-10-CM

## 2021-04-06 PROCEDURE — 86480 TB TEST CELL IMMUN MEASURE: CPT | Performed by: INTERNAL MEDICINE

## 2021-04-06 PROCEDURE — 36415 COLL VENOUS BLD VENIPUNCTURE: CPT | Performed by: INTERNAL MEDICINE

## 2021-04-07 LAB
GAMMA INTERFERON BACKGROUND BLD IA-ACNC: 0.07 IU/ML
M TB IFN-G CD4+ BCKGRND COR BLD-ACNC: -0.02 IU/ML
MITOGEN IGNF BCKGRD COR BLD-ACNC: >10 IU/ML
TB GOLD PLUS: NEGATIVE
TB2 - NIL: -0.03 IU/ML

## 2021-04-14 ENCOUNTER — PATIENT MESSAGE (OUTPATIENT)
Dept: UROLOGY | Facility: CLINIC | Age: 41
End: 2021-04-14

## 2021-04-14 DIAGNOSIS — R39.9 UTI SYMPTOMS: Primary | ICD-10-CM

## 2021-04-16 ENCOUNTER — INFUSION (OUTPATIENT)
Dept: INFECTIOUS DISEASES | Facility: HOSPITAL | Age: 41
End: 2021-04-16
Attending: INTERNAL MEDICINE
Payer: MEDICARE

## 2021-04-16 ENCOUNTER — TELEPHONE (OUTPATIENT)
Dept: GASTROENTEROLOGY | Facility: HOSPITAL | Age: 41
End: 2021-04-16

## 2021-04-16 ENCOUNTER — PATIENT MESSAGE (OUTPATIENT)
Dept: GYNECOLOGIC ONCOLOGY | Facility: CLINIC | Age: 41
End: 2021-04-16

## 2021-04-16 ENCOUNTER — HOSPITAL ENCOUNTER (OUTPATIENT)
Dept: RADIOLOGY | Facility: HOSPITAL | Age: 41
Discharge: HOME | End: 2021-04-16
Attending: INTERNAL MEDICINE
Payer: MEDICARE

## 2021-04-16 ENCOUNTER — PATIENT MESSAGE (OUTPATIENT)
Dept: UROLOGY | Facility: CLINIC | Age: 41
End: 2021-04-16

## 2021-04-16 ENCOUNTER — TELEPHONE (OUTPATIENT)
Dept: GASTROENTEROLOGY | Facility: CLINIC | Age: 41
End: 2021-04-16

## 2021-04-16 DIAGNOSIS — K50.919 CROHN'S DISEASE WITH COMPLICATION, UNSPECIFIED GASTROINTESTINAL TRACT LOCATION: Primary | ICD-10-CM

## 2021-04-16 DIAGNOSIS — K50.00 CROHN'S DISEASE OF SMALL INTESTINE WITHOUT COMPLICATION: ICD-10-CM

## 2021-04-16 PROCEDURE — 72197 MRI PELVIS W/O & W/DYE: CPT | Mod: 26,,, | Performed by: RADIOLOGY

## 2021-04-16 PROCEDURE — 74183 MRI ABD W/O CNTR FLWD CNTR: CPT | Mod: 26,,, | Performed by: RADIOLOGY

## 2021-04-16 PROCEDURE — 74183 MRI ENTEROGRAPHY: ICD-10-PCS | Mod: 26,,, | Performed by: RADIOLOGY

## 2021-04-16 PROCEDURE — 72197 MRI PELVIS W/O & W/DYE: CPT | Mod: TC

## 2021-04-16 PROCEDURE — 72197 MRI ENTEROGRAPHY: ICD-10-PCS | Mod: 26,,, | Performed by: RADIOLOGY

## 2021-04-16 PROCEDURE — A9585 GADOBUTROL INJECTION: HCPCS | Performed by: INTERNAL MEDICINE

## 2021-04-16 PROCEDURE — 25500020 PHARM REV CODE 255: Performed by: INTERNAL MEDICINE

## 2021-04-16 RX ORDER — GADOBUTROL 604.72 MG/ML
10 INJECTION INTRAVENOUS
Status: COMPLETED | OUTPATIENT
Start: 2021-04-16 | End: 2021-04-16

## 2021-04-16 RX ADMIN — GADOBUTROL 10 ML: 604.72 INJECTION INTRAVENOUS at 03:04

## 2021-04-19 ENCOUNTER — PATIENT MESSAGE (OUTPATIENT)
Dept: UROLOGY | Facility: CLINIC | Age: 41
End: 2021-04-19

## 2021-04-19 DIAGNOSIS — B37.31 VAGINAL YEAST INFECTION: ICD-10-CM

## 2021-04-19 RX ORDER — NITROFURANTOIN 25; 75 MG/1; MG/1
100 CAPSULE ORAL 2 TIMES DAILY
Qty: 20 CAPSULE | Refills: 0 | Status: SHIPPED | OUTPATIENT
Start: 2021-04-19 | End: 2021-04-29

## 2021-04-20 ENCOUNTER — PATIENT MESSAGE (OUTPATIENT)
Dept: UROLOGY | Facility: CLINIC | Age: 41
End: 2021-04-20

## 2021-04-20 RX ORDER — FLUCONAZOLE 150 MG/1
150 TABLET ORAL
Qty: 3 TABLET | Refills: 0 | Status: SHIPPED | OUTPATIENT
Start: 2021-04-20 | End: 2021-07-02 | Stop reason: SDUPTHER

## 2021-04-30 ENCOUNTER — PATIENT MESSAGE (OUTPATIENT)
Dept: DERMATOLOGY | Facility: CLINIC | Age: 41
End: 2021-04-30

## 2021-05-04 ENCOUNTER — OFFICE VISIT (OUTPATIENT)
Dept: WOUND CARE | Facility: CLINIC | Age: 41
End: 2021-05-04
Payer: MEDICARE

## 2021-05-04 DIAGNOSIS — Z43.2 ATTENTION TO ILEOSTOMY: ICD-10-CM

## 2021-05-04 DIAGNOSIS — L92.9 GRANULOMA OF SKIN: Primary | ICD-10-CM

## 2021-05-04 PROCEDURE — 17250 CHEM CAUT OF GRANLTJ TISSUE: CPT | Mod: PBBFAC | Performed by: CLINICAL NURSE SPECIALIST

## 2021-05-04 PROCEDURE — 99212 OFFICE O/P EST SF 10 MIN: CPT | Mod: PBBFAC,25 | Performed by: CLINICAL NURSE SPECIALIST

## 2021-05-04 PROCEDURE — 17250 PR CHEM CAUTERY GRANULATN TISSUE: ICD-10-PCS | Mod: S$PBB,,, | Performed by: CLINICAL NURSE SPECIALIST

## 2021-05-04 PROCEDURE — 99214 OFFICE O/P EST MOD 30 MIN: CPT | Mod: S$PBB,25,, | Performed by: CLINICAL NURSE SPECIALIST

## 2021-05-04 PROCEDURE — 99999 PR PBB SHADOW E&M-EST. PATIENT-LVL II: CPT | Mod: PBBFAC,,, | Performed by: CLINICAL NURSE SPECIALIST

## 2021-05-04 PROCEDURE — 99214 PR OFFICE/OUTPT VISIT, EST, LEVL IV, 30-39 MIN: ICD-10-PCS | Mod: S$PBB,25,, | Performed by: CLINICAL NURSE SPECIALIST

## 2021-05-04 PROCEDURE — 99999 PR PBB SHADOW E&M-EST. PATIENT-LVL II: ICD-10-PCS | Mod: PBBFAC,,, | Performed by: CLINICAL NURSE SPECIALIST

## 2021-05-04 PROCEDURE — 17250 CHEM CAUT OF GRANLTJ TISSUE: CPT | Mod: S$PBB,,, | Performed by: CLINICAL NURSE SPECIALIST

## 2021-05-19 ENCOUNTER — PATIENT MESSAGE (OUTPATIENT)
Dept: INTERNAL MEDICINE | Facility: CLINIC | Age: 41
End: 2021-05-19

## 2021-05-19 DIAGNOSIS — F41.1 GENERALIZED ANXIETY DISORDER: ICD-10-CM

## 2021-05-19 RX ORDER — CLONAZEPAM 1 MG/1
TABLET ORAL
Qty: 90 TABLET | Refills: 0 | Status: CANCELLED | OUTPATIENT
Start: 2021-05-19

## 2021-05-25 ENCOUNTER — OFFICE VISIT (OUTPATIENT)
Dept: DERMATOLOGY | Facility: CLINIC | Age: 41
End: 2021-05-25
Payer: MEDICARE

## 2021-05-25 ENCOUNTER — PATIENT MESSAGE (OUTPATIENT)
Dept: DERMATOLOGY | Facility: CLINIC | Age: 41
End: 2021-05-25

## 2021-05-25 ENCOUNTER — PATIENT MESSAGE (OUTPATIENT)
Dept: OBSTETRICS AND GYNECOLOGY | Facility: CLINIC | Age: 41
End: 2021-05-25

## 2021-05-25 DIAGNOSIS — L81.4 LENTIGINES: ICD-10-CM

## 2021-05-25 DIAGNOSIS — D22.9 ATYPICAL NEVUS: ICD-10-CM

## 2021-05-25 DIAGNOSIS — D22.5 MULTIPLE BENIGN MELANOCYTIC NEVI OF UPPER EXTREMITY, LOWER EXTREMITY, AND TRUNK: ICD-10-CM

## 2021-05-25 DIAGNOSIS — D22.4 MELANOCYTIC NEVUS OF SCALP: ICD-10-CM

## 2021-05-25 DIAGNOSIS — Z86.018 HISTORY OF DYSPLASTIC NEVUS: ICD-10-CM

## 2021-05-25 DIAGNOSIS — D22.30 MELANOCYTIC NEVI OF FACE: ICD-10-CM

## 2021-05-25 DIAGNOSIS — L82.1 SEBORRHEIC KERATOSIS: ICD-10-CM

## 2021-05-25 DIAGNOSIS — D22.60 MULTIPLE BENIGN MELANOCYTIC NEVI OF UPPER EXTREMITY, LOWER EXTREMITY, AND TRUNK: ICD-10-CM

## 2021-05-25 DIAGNOSIS — D48.5 NEOPLASM OF UNCERTAIN BEHAVIOR OF SKIN: Primary | ICD-10-CM

## 2021-05-25 DIAGNOSIS — D22.70 MULTIPLE BENIGN MELANOCYTIC NEVI OF UPPER EXTREMITY, LOWER EXTREMITY, AND TRUNK: ICD-10-CM

## 2021-05-25 DIAGNOSIS — D18.01 ANGIOMA OF SKIN: ICD-10-CM

## 2021-05-25 PROCEDURE — 99213 PR OFFICE/OUTPT VISIT, EST, LEVL III, 20-29 MIN: ICD-10-PCS | Mod: 25,S$GLB,, | Performed by: DERMATOLOGY

## 2021-05-25 PROCEDURE — 11301 SHAVE SKIN LESION 0.6-1.0 CM: CPT | Mod: S$GLB,,, | Performed by: DERMATOLOGY

## 2021-05-25 PROCEDURE — 88342 CHG IMMUNOCYTOCHEMISTRY: ICD-10-PCS | Mod: 26,,, | Performed by: PATHOLOGY

## 2021-05-25 PROCEDURE — 11300 PR SHAV SKIN LES < 0.5 CM TRUNK,ARM,LEG: ICD-10-PCS | Mod: 59,S$GLB,, | Performed by: DERMATOLOGY

## 2021-05-25 PROCEDURE — 11300 SHAVE SKIN LESION 0.5 CM/<: CPT | Mod: 59,S$GLB,, | Performed by: DERMATOLOGY

## 2021-05-25 PROCEDURE — 88305 TISSUE EXAM BY PATHOLOGIST: ICD-10-PCS | Mod: 26,,, | Performed by: PATHOLOGY

## 2021-05-25 PROCEDURE — 11301 PR SHAV SKIN LES 0.6-1.0 CM TRUNK,ARM,LEG: ICD-10-PCS | Mod: S$GLB,,, | Performed by: DERMATOLOGY

## 2021-05-25 PROCEDURE — 88342 IMHCHEM/IMCYTCHM 1ST ANTB: CPT | Mod: 26,,, | Performed by: PATHOLOGY

## 2021-05-25 PROCEDURE — 88305 TISSUE EXAM BY PATHOLOGIST: CPT | Mod: 26,,, | Performed by: PATHOLOGY

## 2021-05-25 PROCEDURE — 88305 TISSUE EXAM BY PATHOLOGIST: CPT | Mod: 59 | Performed by: PATHOLOGY

## 2021-05-25 PROCEDURE — 99213 OFFICE O/P EST LOW 20 MIN: CPT | Mod: 25,S$GLB,, | Performed by: DERMATOLOGY

## 2021-05-25 PROCEDURE — 88342 IMHCHEM/IMCYTCHM 1ST ANTB: CPT | Performed by: PATHOLOGY

## 2021-05-25 RX ORDER — BENZONATATE 100 MG/1
100 CAPSULE ORAL 3 TIMES DAILY
COMMUNITY
Start: 2021-05-18 | End: 2021-09-24

## 2021-05-25 RX ORDER — AMOXICILLIN AND CLAVULANATE POTASSIUM 875; 125 MG/1; MG/1
1 TABLET, FILM COATED ORAL 2 TIMES DAILY
COMMUNITY
Start: 2021-05-18 | End: 2021-09-24

## 2021-05-26 DIAGNOSIS — R10.2 PELVIC PAIN: Primary | ICD-10-CM

## 2021-05-26 RX ORDER — POTASSIUM CITRATE 15 MEQ/1
2 TABLET, EXTENDED RELEASE ORAL 2 TIMES DAILY
Qty: 360 TABLET | Refills: 3
Start: 2021-05-26 | End: 2023-02-02

## 2021-05-27 ENCOUNTER — PATIENT MESSAGE (OUTPATIENT)
Dept: OBSTETRICS AND GYNECOLOGY | Facility: CLINIC | Age: 41
End: 2021-05-27

## 2021-05-27 ENCOUNTER — PATIENT MESSAGE (OUTPATIENT)
Dept: DERMATOLOGY | Facility: CLINIC | Age: 41
End: 2021-05-27

## 2021-05-27 ENCOUNTER — HOSPITAL ENCOUNTER (OUTPATIENT)
Dept: RADIOLOGY | Facility: OTHER | Age: 41
Discharge: HOME | End: 2021-05-27
Attending: SPECIALIST
Payer: MEDICARE

## 2021-05-27 DIAGNOSIS — R10.2 PELVIC PAIN: ICD-10-CM

## 2021-05-27 PROCEDURE — 76856 US PELVIS COMP WITH TRANSVAG NON-OB (XPD): ICD-10-PCS | Mod: 26,,, | Performed by: RADIOLOGY

## 2021-05-27 PROCEDURE — 76856 US EXAM PELVIC COMPLETE: CPT | Mod: TC

## 2021-05-27 PROCEDURE — 76830 TRANSVAGINAL US NON-OB: CPT | Mod: 26,,, | Performed by: RADIOLOGY

## 2021-05-27 PROCEDURE — 76830 US PELVIS COMP WITH TRANSVAG NON-OB (XPD): ICD-10-PCS | Mod: 26,,, | Performed by: RADIOLOGY

## 2021-05-27 PROCEDURE — 76856 US EXAM PELVIC COMPLETE: CPT | Mod: 26,,, | Performed by: RADIOLOGY

## 2021-05-28 ENCOUNTER — PATIENT MESSAGE (OUTPATIENT)
Dept: OBSTETRICS AND GYNECOLOGY | Facility: CLINIC | Age: 41
End: 2021-05-28

## 2021-06-03 LAB
FINAL PATHOLOGIC DIAGNOSIS: NORMAL
GROSS: NORMAL
Lab: NORMAL
MICROSCOPIC EXAM: NORMAL

## 2021-06-07 ENCOUNTER — PATIENT MESSAGE (OUTPATIENT)
Dept: WOUND CARE | Facility: CLINIC | Age: 41
End: 2021-06-07

## 2021-06-21 DIAGNOSIS — F41.1 GENERALIZED ANXIETY DISORDER: ICD-10-CM

## 2021-06-21 RX ORDER — CLONAZEPAM 1 MG/1
TABLET ORAL
Qty: 90 TABLET | Refills: 0 | Status: SHIPPED | OUTPATIENT
Start: 2021-06-21 | End: 2021-07-22 | Stop reason: SDUPTHER

## 2021-06-22 ENCOUNTER — OFFICE VISIT (OUTPATIENT)
Dept: OBSTETRICS AND GYNECOLOGY | Facility: CLINIC | Age: 41
End: 2021-06-22
Payer: MEDICARE

## 2021-06-22 ENCOUNTER — LAB VISIT (OUTPATIENT)
Dept: LAB | Facility: HOSPITAL | Age: 41
End: 2021-06-22
Attending: SPECIALIST
Payer: MEDICARE

## 2021-06-22 VITALS
HEIGHT: 61 IN | DIASTOLIC BLOOD PRESSURE: 68 MMHG | WEIGHT: 123.69 LBS | BODY MASS INDEX: 23.35 KG/M2 | RESPIRATION RATE: 14 BRPM | SYSTOLIC BLOOD PRESSURE: 110 MMHG

## 2021-06-22 DIAGNOSIS — N95.1 MENOPAUSAL SYMPTOMS: ICD-10-CM

## 2021-06-22 DIAGNOSIS — Z20.2 POSSIBLE EXPOSURE TO STD: Primary | ICD-10-CM

## 2021-06-22 DIAGNOSIS — R53.82 CHRONIC FATIGUE, UNSPECIFIED: ICD-10-CM

## 2021-06-22 DIAGNOSIS — Z20.2 POSSIBLE EXPOSURE TO STD: ICD-10-CM

## 2021-06-22 LAB
ESTRADIOL SERPL-MCNC: 16 PG/ML
FSH SERPL-ACNC: 80.6 MIU/ML

## 2021-06-22 PROCEDURE — 86703 HIV-1/HIV-2 1 RESULT ANTBDY: CPT | Performed by: SPECIALIST

## 2021-06-22 PROCEDURE — 99214 OFFICE O/P EST MOD 30 MIN: CPT | Mod: PBBFAC,PN | Performed by: SPECIALIST

## 2021-06-22 PROCEDURE — 36415 COLL VENOUS BLD VENIPUNCTURE: CPT | Mod: PO | Performed by: SPECIALIST

## 2021-06-22 PROCEDURE — 86694 HERPES SIMPLEX NES ANTBDY: CPT | Performed by: SPECIALIST

## 2021-06-22 PROCEDURE — 86696 HERPES SIMPLEX TYPE 2 TEST: CPT | Performed by: SPECIALIST

## 2021-06-22 PROCEDURE — 99999 PR PBB SHADOW E&M-EST. PATIENT-LVL IV: CPT | Mod: PBBFAC,,, | Performed by: SPECIALIST

## 2021-06-22 PROCEDURE — 83001 ASSAY OF GONADOTROPIN (FSH): CPT | Performed by: SPECIALIST

## 2021-06-22 PROCEDURE — 86592 SYPHILIS TEST NON-TREP QUAL: CPT | Performed by: SPECIALIST

## 2021-06-22 PROCEDURE — 87491 CHLMYD TRACH DNA AMP PROBE: CPT | Performed by: SPECIALIST

## 2021-06-22 PROCEDURE — 99999 PR PBB SHADOW E&M-EST. PATIENT-LVL IV: ICD-10-PCS | Mod: PBBFAC,,, | Performed by: SPECIALIST

## 2021-06-22 PROCEDURE — 86695 HERPES SIMPLEX TYPE 1 TEST: CPT | Performed by: SPECIALIST

## 2021-06-22 PROCEDURE — 82670 ASSAY OF TOTAL ESTRADIOL: CPT | Performed by: SPECIALIST

## 2021-06-22 PROCEDURE — 99213 OFFICE O/P EST LOW 20 MIN: CPT | Mod: S$PBB,,, | Performed by: SPECIALIST

## 2021-06-22 PROCEDURE — 99213 PR OFFICE/OUTPT VISIT, EST, LEVL III, 20-29 MIN: ICD-10-PCS | Mod: S$PBB,,, | Performed by: SPECIALIST

## 2021-06-22 PROCEDURE — 80074 ACUTE HEPATITIS PANEL: CPT | Performed by: SPECIALIST

## 2021-06-22 PROCEDURE — 87591 N.GONORRHOEAE DNA AMP PROB: CPT | Performed by: SPECIALIST

## 2021-06-23 ENCOUNTER — TELEPHONE (OUTPATIENT)
Dept: GASTROENTEROLOGY | Facility: HOSPITAL | Age: 41
End: 2021-06-23

## 2021-06-23 ENCOUNTER — TELEPHONE (OUTPATIENT)
Dept: OBSTETRICS AND GYNECOLOGY | Facility: CLINIC | Age: 41
End: 2021-06-23

## 2021-06-23 ENCOUNTER — PATIENT MESSAGE (OUTPATIENT)
Dept: OBSTETRICS AND GYNECOLOGY | Facility: CLINIC | Age: 41
End: 2021-06-23

## 2021-06-23 LAB
HAV IGM SERPL QL IA: NEGATIVE
HBV CORE IGM SERPL QL IA: NEGATIVE
HBV SURFACE AG SERPL QL IA: NEGATIVE
HCV AB SERPL QL IA: NEGATIVE
HIV 1+2 AB+HIV1 P24 AG SERPL QL IA: NEGATIVE
RPR SER QL: NORMAL

## 2021-06-23 RX ORDER — SODIUM CHLORIDE 0.9 % (FLUSH) 0.9 %
10 SYRINGE (ML) INJECTION
Status: CANCELLED | OUTPATIENT
Start: 2021-06-23

## 2021-06-23 RX ORDER — HEPARIN 100 UNIT/ML
500 SYRINGE INTRAVENOUS
Status: CANCELLED | OUTPATIENT
Start: 2021-06-23

## 2021-06-24 ENCOUNTER — CLINICAL SUPPORT (OUTPATIENT)
Dept: OBSTETRICS AND GYNECOLOGY | Facility: CLINIC | Age: 41
End: 2021-06-24
Payer: MEDICARE

## 2021-06-24 DIAGNOSIS — N95.1 MENOPAUSAL SYMPTOM: Primary | ICD-10-CM

## 2021-06-24 LAB
C TRACH DNA SPEC QL NAA+PROBE: NOT DETECTED
N GONORRHOEA DNA SPEC QL NAA+PROBE: NOT DETECTED

## 2021-06-24 PROCEDURE — 96372 THER/PROPH/DIAG INJ SC/IM: CPT | Mod: PBBFAC,PN

## 2021-06-24 PROCEDURE — 99999 PR PBB SHADOW E&M-EST. PATIENT-LVL I: CPT | Mod: PBBFAC,,,

## 2021-06-24 PROCEDURE — 99999 PR PBB SHADOW E&M-EST. PATIENT-LVL I: ICD-10-PCS | Mod: PBBFAC,,,

## 2021-06-24 PROCEDURE — 99211 OFF/OP EST MAY X REQ PHY/QHP: CPT | Mod: PBBFAC,PN,25

## 2021-06-24 RX ORDER — ESTRADIOL VALERATE 20 MG/ML
20 INJECTION INTRAMUSCULAR
Status: COMPLETED | OUTPATIENT
Start: 2021-06-24 | End: 2021-06-24

## 2021-06-24 RX ADMIN — ESTRADIOL VALERATE 20 MG: 20 INJECTION INTRAMUSCULAR at 11:06

## 2021-06-25 LAB
HSV AB, IGM BY EIA: 0.39 INDEX
HSV1 IGG SERPL QL IA: POSITIVE
HSV2 IGG SERPL QL IA: POSITIVE

## 2021-07-01 ENCOUNTER — PATIENT MESSAGE (OUTPATIENT)
Dept: UROLOGY | Facility: CLINIC | Age: 41
End: 2021-07-01

## 2021-07-01 DIAGNOSIS — B37.31 VAGINAL YEAST INFECTION: ICD-10-CM

## 2021-07-01 DIAGNOSIS — R39.9 UTI SYMPTOMS: Primary | ICD-10-CM

## 2021-07-02 ENCOUNTER — PATIENT MESSAGE (OUTPATIENT)
Dept: UROLOGY | Facility: CLINIC | Age: 41
End: 2021-07-02

## 2021-07-02 RX ORDER — NITROFURANTOIN 25; 75 MG/1; MG/1
100 CAPSULE ORAL 2 TIMES DAILY
Qty: 20 CAPSULE | Refills: 0 | Status: SHIPPED | OUTPATIENT
Start: 2021-07-02 | End: 2021-07-12

## 2021-07-02 RX ORDER — FLUCONAZOLE 150 MG/1
150 TABLET ORAL
Qty: 3 TABLET | Refills: 0 | Status: SHIPPED | OUTPATIENT
Start: 2021-07-02 | End: 2022-01-27

## 2021-07-07 ENCOUNTER — INFUSION (OUTPATIENT)
Dept: INFECTIOUS DISEASES | Facility: HOSPITAL | Age: 41
End: 2021-07-07
Attending: INTERNAL MEDICINE
Payer: MEDICARE

## 2021-07-07 VITALS
SYSTOLIC BLOOD PRESSURE: 108 MMHG | WEIGHT: 123 LBS | HEIGHT: 61 IN | DIASTOLIC BLOOD PRESSURE: 74 MMHG | BODY MASS INDEX: 23.22 KG/M2 | TEMPERATURE: 98 F | OXYGEN SATURATION: 98 % | HEART RATE: 61 BPM

## 2021-07-07 DIAGNOSIS — K50.00 CROHN'S DISEASE OF SMALL INTESTINE WITHOUT COMPLICATION: Primary | ICD-10-CM

## 2021-07-07 PROCEDURE — 63600175 PHARM REV CODE 636 W HCPCS: Performed by: INTERNAL MEDICINE

## 2021-07-07 PROCEDURE — 96361 HYDRATE IV INFUSION ADD-ON: CPT

## 2021-07-07 PROCEDURE — 96360 HYDRATION IV INFUSION INIT: CPT

## 2021-07-07 PROCEDURE — 25000003 PHARM REV CODE 250: Performed by: INTERNAL MEDICINE

## 2021-07-07 RX ORDER — IPRATROPIUM BROMIDE AND ALBUTEROL SULFATE 2.5; .5 MG/3ML; MG/3ML
3 SOLUTION RESPIRATORY (INHALATION)
Status: CANCELLED | OUTPATIENT
Start: 2021-09-01

## 2021-07-07 RX ORDER — HEPARIN 100 UNIT/ML
500 SYRINGE INTRAVENOUS
Status: DISCONTINUED | OUTPATIENT
Start: 2021-07-07 | End: 2021-07-07 | Stop reason: HOSPADM

## 2021-07-07 RX ORDER — METHYLPREDNISOLONE SOD SUCC 125 MG
40 VIAL (EA) INJECTION
Status: CANCELLED | OUTPATIENT
Start: 2021-09-01

## 2021-07-07 RX ORDER — SODIUM CHLORIDE 0.9 % (FLUSH) 0.9 %
10 SYRINGE (ML) INJECTION
Status: CANCELLED | OUTPATIENT
Start: 2021-09-01

## 2021-07-07 RX ORDER — HEPARIN 100 UNIT/ML
500 SYRINGE INTRAVENOUS
Status: CANCELLED | OUTPATIENT
Start: 2021-09-01

## 2021-07-07 RX ORDER — ACETAMINOPHEN 325 MG/1
650 TABLET ORAL
Status: CANCELLED | OUTPATIENT
Start: 2021-09-01

## 2021-07-07 RX ORDER — DIPHENHYDRAMINE HYDROCHLORIDE 50 MG/ML
25 INJECTION INTRAMUSCULAR; INTRAVENOUS
Status: CANCELLED | OUTPATIENT
Start: 2021-09-01

## 2021-07-07 RX ORDER — EPINEPHRINE 1 MG/ML
0.3 INJECTION, SOLUTION, CONCENTRATE INTRAVENOUS
Status: CANCELLED | OUTPATIENT
Start: 2021-09-01

## 2021-07-07 RX ORDER — SODIUM CHLORIDE 0.9 % (FLUSH) 0.9 %
10 SYRINGE (ML) INJECTION
Status: DISCONTINUED | OUTPATIENT
Start: 2021-07-07 | End: 2021-07-07 | Stop reason: HOSPADM

## 2021-07-07 RX ADMIN — HEPARIN 500 UNITS: 100 SYRINGE at 01:07

## 2021-07-07 RX ADMIN — SODIUM CHLORIDE 1000 ML: 0.9 INJECTION, SOLUTION INTRAVENOUS at 12:07

## 2021-07-09 ENCOUNTER — PATIENT MESSAGE (OUTPATIENT)
Dept: UROLOGY | Facility: CLINIC | Age: 41
End: 2021-07-09

## 2021-07-19 ENCOUNTER — PATIENT MESSAGE (OUTPATIENT)
Dept: OBSTETRICS AND GYNECOLOGY | Facility: CLINIC | Age: 41
End: 2021-07-19

## 2021-07-23 ENCOUNTER — CLINICAL SUPPORT (OUTPATIENT)
Dept: OBSTETRICS AND GYNECOLOGY | Facility: CLINIC | Age: 41
End: 2021-07-23
Payer: MEDICARE

## 2021-07-23 DIAGNOSIS — N95.1 MENOPAUSAL SYMPTOMS: Primary | ICD-10-CM

## 2021-07-23 PROCEDURE — 99211 OFF/OP EST MAY X REQ PHY/QHP: CPT | Mod: PBBFAC,PN,25

## 2021-07-23 PROCEDURE — 99999 PR PBB SHADOW E&M-EST. PATIENT-LVL I: ICD-10-PCS | Mod: PBBFAC,,,

## 2021-07-23 PROCEDURE — 96372 THER/PROPH/DIAG INJ SC/IM: CPT | Mod: PBBFAC,PN

## 2021-07-23 PROCEDURE — 99999 PR PBB SHADOW E&M-EST. PATIENT-LVL I: CPT | Mod: PBBFAC,,,

## 2021-07-23 RX ORDER — ESTRADIOL VALERATE 20 MG/ML
20 INJECTION INTRAMUSCULAR
Status: SHIPPED | OUTPATIENT
Start: 2021-07-23

## 2021-07-23 RX ADMIN — ESTRADIOL VALERATE 20 MG: 20 INJECTION INTRAMUSCULAR at 11:07

## 2021-08-02 DIAGNOSIS — F41.9 ANXIETY: ICD-10-CM

## 2021-08-02 RX ORDER — ZOLPIDEM TARTRATE 10 MG/1
10 TABLET ORAL NIGHTLY
Qty: 30 TABLET | Refills: 0 | Status: CANCELLED | OUTPATIENT
Start: 2021-08-02

## 2021-08-05 ENCOUNTER — PATIENT MESSAGE (OUTPATIENT)
Dept: GASTROENTEROLOGY | Facility: CLINIC | Age: 41
End: 2021-08-05

## 2021-08-20 ENCOUNTER — PATIENT MESSAGE (OUTPATIENT)
Dept: GASTROENTEROLOGY | Facility: CLINIC | Age: 41
End: 2021-08-20

## 2021-08-23 ENCOUNTER — PATIENT MESSAGE (OUTPATIENT)
Dept: DERMATOLOGY | Facility: CLINIC | Age: 41
End: 2021-08-23

## 2021-08-25 ENCOUNTER — PATIENT MESSAGE (OUTPATIENT)
Dept: OBSTETRICS AND GYNECOLOGY | Facility: CLINIC | Age: 41
End: 2021-08-25

## 2021-08-26 ENCOUNTER — TELEPHONE (OUTPATIENT)
Dept: OBSTETRICS AND GYNECOLOGY | Facility: CLINIC | Age: 41
End: 2021-08-26

## 2021-08-26 ENCOUNTER — CLINICAL SUPPORT (OUTPATIENT)
Dept: OBSTETRICS AND GYNECOLOGY | Facility: CLINIC | Age: 41
End: 2021-08-26
Payer: MEDICARE

## 2021-08-26 PROCEDURE — 99999 PR PBB SHADOW E&M-EST. PATIENT-LVL I: ICD-10-PCS | Mod: PBBFAC,,,

## 2021-08-26 PROCEDURE — 96372 THER/PROPH/DIAG INJ SC/IM: CPT | Mod: PBBFAC,PN

## 2021-08-26 PROCEDURE — 99999 PR PBB SHADOW E&M-EST. PATIENT-LVL I: CPT | Mod: PBBFAC,,,

## 2021-08-26 PROCEDURE — 99211 OFF/OP EST MAY X REQ PHY/QHP: CPT | Mod: PBBFAC,PN

## 2021-08-26 RX ADMIN — ESTRADIOL VALERATE 20 MG: 20 INJECTION INTRAMUSCULAR at 10:08

## 2021-09-07 ENCOUNTER — INFUSION (OUTPATIENT)
Dept: INFECTIOUS DISEASES | Facility: HOSPITAL | Age: 41
End: 2021-09-07
Attending: INTERNAL MEDICINE
Payer: MEDICARE

## 2021-09-07 ENCOUNTER — IMMUNIZATION (OUTPATIENT)
Dept: INTERNAL MEDICINE | Facility: CLINIC | Age: 41
End: 2021-09-07
Payer: MEDICARE

## 2021-09-07 ENCOUNTER — PATIENT MESSAGE (OUTPATIENT)
Dept: WOUND CARE | Facility: CLINIC | Age: 41
End: 2021-09-07

## 2021-09-07 DIAGNOSIS — Z23 NEED FOR VACCINATION: Primary | ICD-10-CM

## 2021-09-07 DIAGNOSIS — I87.8 POOR VENOUS ACCESS: Primary | ICD-10-CM

## 2021-09-07 PROCEDURE — 91300 COVID-19, MRNA, LNP-S, PF, 30 MCG/0.3 ML DOSE VACCINE: ICD-10-PCS | Mod: ,,, | Performed by: INTERNAL MEDICINE

## 2021-09-07 PROCEDURE — 0003A COVID-19, MRNA, LNP-S, PF, 30 MCG/0.3 ML DOSE VACCINE: ICD-10-PCS | Mod: CV19,,, | Performed by: INTERNAL MEDICINE

## 2021-09-07 PROCEDURE — 91300 COVID-19, MRNA, LNP-S, PF, 30 MCG/0.3 ML DOSE VACCINE: CPT | Mod: ,,, | Performed by: INTERNAL MEDICINE

## 2021-09-07 PROCEDURE — 0003A COVID-19, MRNA, LNP-S, PF, 30 MCG/0.3 ML DOSE VACCINE: CPT | Mod: CV19,,, | Performed by: INTERNAL MEDICINE

## 2021-09-07 PROCEDURE — 96372 THER/PROPH/DIAG INJ SC/IM: CPT

## 2021-09-07 RX ORDER — SODIUM CHLORIDE 0.9 % (FLUSH) 0.9 %
10 SYRINGE (ML) INJECTION
Status: CANCELLED | OUTPATIENT
Start: 2021-09-07

## 2021-09-07 RX ORDER — HEPARIN 100 UNIT/ML
500 SYRINGE INTRAVENOUS
Status: CANCELLED | OUTPATIENT
Start: 2021-09-07

## 2021-09-07 RX ORDER — SODIUM CHLORIDE 0.9 % (FLUSH) 0.9 %
10 SYRINGE (ML) INJECTION
Status: DISCONTINUED | OUTPATIENT
Start: 2021-09-07 | End: 2021-09-07 | Stop reason: HOSPADM

## 2021-09-07 RX ORDER — HEPARIN 100 UNIT/ML
500 SYRINGE INTRAVENOUS
Status: DISCONTINUED | OUTPATIENT
Start: 2021-09-07 | End: 2021-09-07 | Stop reason: HOSPADM

## 2021-09-08 ENCOUNTER — PATIENT MESSAGE (OUTPATIENT)
Dept: DERMATOLOGY | Facility: CLINIC | Age: 41
End: 2021-09-08

## 2021-09-10 ENCOUNTER — PROCEDURE VISIT (OUTPATIENT)
Dept: DERMATOLOGY | Facility: CLINIC | Age: 41
End: 2021-09-10
Payer: MEDICARE

## 2021-09-10 ENCOUNTER — PATIENT OUTREACH (OUTPATIENT)
Dept: ADMINISTRATIVE | Facility: OTHER | Age: 41
End: 2021-09-10

## 2021-09-10 DIAGNOSIS — Z41.1 ELECTIVE PROCEDURE FOR UNACCEPTABLE COSMETIC APPEARANCE: Primary | ICD-10-CM

## 2021-09-10 PROCEDURE — 99499 NO LOS: ICD-10-PCS | Mod: CSM,S$GLB,, | Performed by: DERMATOLOGY

## 2021-09-10 PROCEDURE — 11954 SUBQ NJX FIL MATRL>10.0 CC: CPT | Mod: CSM,S$GLB,, | Performed by: DERMATOLOGY

## 2021-09-10 PROCEDURE — 99499 UNLISTED E&M SERVICE: CPT | Mod: CSM,S$GLB,, | Performed by: DERMATOLOGY

## 2021-09-10 PROCEDURE — 11954 PR JUVEDERM ULTRA PLUS: ICD-10-PCS | Mod: CSM,S$GLB,, | Performed by: DERMATOLOGY

## 2021-09-16 ENCOUNTER — PATIENT MESSAGE (OUTPATIENT)
Dept: OBSTETRICS AND GYNECOLOGY | Facility: CLINIC | Age: 41
End: 2021-09-16

## 2021-09-17 ENCOUNTER — PATIENT MESSAGE (OUTPATIENT)
Dept: INTERNAL MEDICINE | Facility: CLINIC | Age: 41
End: 2021-09-17

## 2021-09-17 DIAGNOSIS — K50.018 CROHN'S DISEASE OF SMALL INTESTINE WITH OTHER COMPLICATION: Primary | ICD-10-CM

## 2021-09-17 RX ORDER — FLUCONAZOLE 200 MG/1
200 TABLET ORAL ONCE
Qty: 1 TABLET | Refills: 0 | Status: SHIPPED | OUTPATIENT
Start: 2021-09-17 | End: 2021-09-17

## 2021-09-20 ENCOUNTER — PATIENT MESSAGE (OUTPATIENT)
Dept: INTERNAL MEDICINE | Facility: CLINIC | Age: 41
End: 2021-09-20

## 2021-09-21 ENCOUNTER — PATIENT MESSAGE (OUTPATIENT)
Dept: OBSTETRICS AND GYNECOLOGY | Facility: CLINIC | Age: 41
End: 2021-09-21

## 2021-09-22 ENCOUNTER — LAB VISIT (OUTPATIENT)
Dept: LAB | Facility: HOSPITAL | Age: 41
End: 2021-09-22
Attending: INTERNAL MEDICINE
Payer: MEDICARE

## 2021-09-22 DIAGNOSIS — K50.018 CROHN'S DISEASE OF SMALL INTESTINE WITH OTHER COMPLICATION: ICD-10-CM

## 2021-09-22 LAB
ALBUMIN SERPL BCP-MCNC: 3.8 G/DL (ref 3.5–5.2)
ALP SERPL-CCNC: 94 U/L (ref 55–135)
ALT SERPL W/O P-5'-P-CCNC: 13 U/L (ref 10–44)
ANION GAP SERPL CALC-SCNC: 12 MMOL/L (ref 8–16)
AST SERPL-CCNC: 18 U/L (ref 10–40)
BILIRUB SERPL-MCNC: 0.4 MG/DL (ref 0.1–1)
BUN SERPL-MCNC: 9 MG/DL (ref 6–20)
CALCIUM SERPL-MCNC: 9.4 MG/DL (ref 8.7–10.5)
CHLORIDE SERPL-SCNC: 107 MMOL/L (ref 95–110)
CO2 SERPL-SCNC: 21 MMOL/L (ref 23–29)
CREAT SERPL-MCNC: 0.8 MG/DL (ref 0.5–1.4)
EST. GFR  (AFRICAN AMERICAN): >60 ML/MIN/1.73 M^2
EST. GFR  (NON AFRICAN AMERICAN): >60 ML/MIN/1.73 M^2
GLUCOSE SERPL-MCNC: 101 MG/DL (ref 70–110)
POTASSIUM SERPL-SCNC: 4 MMOL/L (ref 3.5–5.1)
PROT SERPL-MCNC: 8.1 G/DL (ref 6–8.4)
SODIUM SERPL-SCNC: 140 MMOL/L (ref 136–145)

## 2021-09-22 PROCEDURE — 80053 COMPREHEN METABOLIC PANEL: CPT | Performed by: INTERNAL MEDICINE

## 2021-09-22 PROCEDURE — 36415 COLL VENOUS BLD VENIPUNCTURE: CPT | Performed by: INTERNAL MEDICINE

## 2021-09-23 ENCOUNTER — PATIENT MESSAGE (OUTPATIENT)
Dept: OBSTETRICS AND GYNECOLOGY | Facility: CLINIC | Age: 41
End: 2021-09-23

## 2021-09-23 DIAGNOSIS — F41.1 GENERALIZED ANXIETY DISORDER: ICD-10-CM

## 2021-09-23 RX ORDER — CLONAZEPAM 1 MG/1
TABLET ORAL
Qty: 80 TABLET | Refills: 0 | Status: CANCELLED | OUTPATIENT
Start: 2021-09-23

## 2021-09-24 ENCOUNTER — PATIENT MESSAGE (OUTPATIENT)
Dept: OBSTETRICS AND GYNECOLOGY | Facility: CLINIC | Age: 41
End: 2021-09-24

## 2021-09-24 ENCOUNTER — OFFICE VISIT (OUTPATIENT)
Dept: INTERNAL MEDICINE | Facility: CLINIC | Age: 41
End: 2021-09-24
Payer: MEDICARE

## 2021-09-24 VITALS
DIASTOLIC BLOOD PRESSURE: 90 MMHG | OXYGEN SATURATION: 98 % | HEART RATE: 60 BPM | HEIGHT: 61 IN | BODY MASS INDEX: 22.27 KG/M2 | WEIGHT: 117.94 LBS | SYSTOLIC BLOOD PRESSURE: 110 MMHG

## 2021-09-24 DIAGNOSIS — F41.1 GENERALIZED ANXIETY DISORDER: Primary | ICD-10-CM

## 2021-09-24 DIAGNOSIS — M85.89 OSTEOPENIA OF MULTIPLE SITES: ICD-10-CM

## 2021-09-24 DIAGNOSIS — K21.9 GASTROESOPHAGEAL REFLUX DISEASE WITHOUT ESOPHAGITIS: ICD-10-CM

## 2021-09-24 DIAGNOSIS — E55.9 VITAMIN D DEFICIENCY: ICD-10-CM

## 2021-09-24 DIAGNOSIS — E04.2 MULTIPLE THYROID NODULES: ICD-10-CM

## 2021-09-24 DIAGNOSIS — Z23 NEED FOR INFLUENZA VACCINATION: ICD-10-CM

## 2021-09-24 DIAGNOSIS — F32.0 CURRENT MILD EPISODE OF MAJOR DEPRESSIVE DISORDER WITHOUT PRIOR EPISODE: ICD-10-CM

## 2021-09-24 DIAGNOSIS — K50.019 CROHN'S DISEASE OF SMALL INTESTINE WITH COMPLICATION: ICD-10-CM

## 2021-09-24 DIAGNOSIS — Z13.6 ENCOUNTER FOR LIPID SCREENING FOR CARDIOVASCULAR DISEASE: ICD-10-CM

## 2021-09-24 DIAGNOSIS — G40.219 COMPLEX PARTIAL EPILEPSY WITH GENERALIZATION AND WITH INTRACTABLE EPILEPSY: ICD-10-CM

## 2021-09-24 DIAGNOSIS — Z87.440 HISTORY OF RECURRENT UTI (URINARY TRACT INFECTION): ICD-10-CM

## 2021-09-24 DIAGNOSIS — Z87.898 HISTORY OF PROLONGED Q-T INTERVAL ON ECG: ICD-10-CM

## 2021-09-24 DIAGNOSIS — Z13.220 ENCOUNTER FOR LIPID SCREENING FOR CARDIOVASCULAR DISEASE: ICD-10-CM

## 2021-09-24 PROCEDURE — 99214 PR OFFICE/OUTPT VISIT, EST, LEVL IV, 30-39 MIN: ICD-10-PCS | Mod: S$PBB,,, | Performed by: INTERNAL MEDICINE

## 2021-09-24 PROCEDURE — 99999 PR PBB SHADOW E&M-EST. PATIENT-LVL IV: ICD-10-PCS | Mod: PBBFAC,,, | Performed by: INTERNAL MEDICINE

## 2021-09-24 PROCEDURE — 99999 PR PBB SHADOW E&M-EST. PATIENT-LVL IV: CPT | Mod: PBBFAC,,, | Performed by: INTERNAL MEDICINE

## 2021-09-24 PROCEDURE — 99214 OFFICE O/P EST MOD 30 MIN: CPT | Mod: PBBFAC | Performed by: INTERNAL MEDICINE

## 2021-09-24 PROCEDURE — 90686 IIV4 VACC NO PRSV 0.5 ML IM: CPT | Mod: PBBFAC

## 2021-09-24 PROCEDURE — 99214 OFFICE O/P EST MOD 30 MIN: CPT | Mod: S$PBB,,, | Performed by: INTERNAL MEDICINE

## 2021-09-24 RX ORDER — METRONIDAZOLE 250 MG/1
250 TABLET ORAL 3 TIMES DAILY
Qty: 21 TABLET | Refills: 0 | Status: SHIPPED | OUTPATIENT
Start: 2021-09-24 | End: 2021-09-24

## 2021-09-24 RX ORDER — NAPROXEN SODIUM 220 MG/1
81 TABLET, FILM COATED ORAL DAILY
COMMUNITY
End: 2022-03-30

## 2021-09-29 ENCOUNTER — TELEPHONE (OUTPATIENT)
Dept: INTERNAL MEDICINE | Facility: CLINIC | Age: 41
End: 2021-09-29

## 2021-09-30 ENCOUNTER — CLINICAL SUPPORT (OUTPATIENT)
Dept: OBSTETRICS AND GYNECOLOGY | Facility: CLINIC | Age: 41
End: 2021-09-30
Payer: MEDICARE

## 2021-09-30 PROCEDURE — 96372 THER/PROPH/DIAG INJ SC/IM: CPT | Mod: PBBFAC,PN

## 2021-09-30 RX ADMIN — ESTRADIOL VALERATE 20 MG: 20 INJECTION INTRAMUSCULAR at 01:09

## 2021-10-06 ENCOUNTER — PATIENT MESSAGE (OUTPATIENT)
Dept: DERMATOLOGY | Facility: CLINIC | Age: 41
End: 2021-10-06

## 2021-10-11 ENCOUNTER — PATIENT MESSAGE (OUTPATIENT)
Dept: CARDIOLOGY | Facility: HOSPITAL | Age: 41
End: 2021-10-11

## 2021-10-14 ENCOUNTER — PROCEDURE VISIT (OUTPATIENT)
Dept: DERMATOLOGY | Facility: CLINIC | Age: 41
End: 2021-10-14
Payer: MEDICARE

## 2021-10-14 ENCOUNTER — OFFICE VISIT (OUTPATIENT)
Dept: URGENT CARE | Facility: CLINIC | Age: 41
End: 2021-10-14
Payer: COMMERCIAL

## 2021-10-14 VITALS
HEIGHT: 61 IN | DIASTOLIC BLOOD PRESSURE: 90 MMHG | BODY MASS INDEX: 22.28 KG/M2 | TEMPERATURE: 100 F | HEART RATE: 86 BPM | OXYGEN SATURATION: 97 % | WEIGHT: 118 LBS | SYSTOLIC BLOOD PRESSURE: 136 MMHG | RESPIRATION RATE: 16 BRPM

## 2021-10-14 DIAGNOSIS — Z41.1 ELECTIVE PROCEDURE FOR UNACCEPTABLE COSMETIC APPEARANCE: Primary | ICD-10-CM

## 2021-10-14 DIAGNOSIS — R10.32 BILATERAL LOWER ABDOMINAL DISCOMFORT: ICD-10-CM

## 2021-10-14 DIAGNOSIS — M54.2 NECK PAIN: Primary | ICD-10-CM

## 2021-10-14 DIAGNOSIS — M25.512 ACUTE PAIN OF BOTH SHOULDERS: ICD-10-CM

## 2021-10-14 DIAGNOSIS — M25.511 ACUTE PAIN OF BOTH SHOULDERS: ICD-10-CM

## 2021-10-14 DIAGNOSIS — R10.31 BILATERAL LOWER ABDOMINAL DISCOMFORT: ICD-10-CM
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
## 2021-10-14 PROCEDURE — 99499 NO LOS: ICD-10-PCS | Mod: CSM,S$GLB,, | Performed by: DERMATOLOGY

## 2021-10-14 PROCEDURE — 11950 SUBQ NJX FILLING MATRL 1CC/<: CPT | Mod: CSM,S$GLB,, | Performed by: DERMATOLOGY

## 2021-10-14 PROCEDURE — 99499 UNLISTED E&M SERVICE: CPT | Mod: CSM,S$GLB,, | Performed by: DERMATOLOGY

## 2021-10-14 PROCEDURE — 99214 PR OFFICE/OUTPT VISIT, EST, LEVL IV, 30-39 MIN: ICD-10-PCS | Mod: S$GLB,,, | Performed by: PHYSICIAN ASSISTANT

## 2021-10-14 PROCEDURE — 99214 OFFICE O/P EST MOD 30 MIN: CPT | Mod: S$GLB,,, | Performed by: PHYSICIAN ASSISTANT

## 2021-10-14 PROCEDURE — 11950 PR JUVEDERM VOLUMA 1ML: ICD-10-PCS | Mod: CSM,S$GLB,, | Performed by: DERMATOLOGY

## 2021-10-14 RX ORDER — TRAMADOL HYDROCHLORIDE 50 MG/1
50 TABLET ORAL EVERY 8 HOURS PRN
Qty: 10 TABLET | Refills: 0 | Status: SHIPPED | OUTPATIENT
Start: 2021-10-14 | End: 2021-10-17

## 2021-10-14 RX ORDER — CYCLOBENZAPRINE HCL 10 MG
10 TABLET ORAL 3 TIMES DAILY PRN
Qty: 30 TABLET | Refills: 0 | Status: SHIPPED | OUTPATIENT
Start: 2021-10-14 | End: 2021-10-24

## 2021-10-20 RX ORDER — GABAPENTIN 300 MG/1
CAPSULE ORAL
Qty: 60 CAPSULE | Refills: 11 | Status: SHIPPED | OUTPATIENT
Start: 2021-10-20 | End: 2022-11-02

## 2021-10-25 ENCOUNTER — PATIENT MESSAGE (OUTPATIENT)
Dept: OBSTETRICS AND GYNECOLOGY | Facility: CLINIC | Age: 41
End: 2021-10-25
Payer: MEDICARE

## 2021-10-25 RX ORDER — METRONIDAZOLE 7.5 MG/G
1 GEL VAGINAL 2 TIMES DAILY
Qty: 70 G | Refills: 0 | Status: SHIPPED | OUTPATIENT
Start: 2021-10-25 | End: 2022-01-13 | Stop reason: SDUPTHER

## 2021-10-26 ENCOUNTER — PATIENT MESSAGE (OUTPATIENT)
Dept: OBSTETRICS AND GYNECOLOGY | Facility: CLINIC | Age: 41
End: 2021-10-26
Payer: MEDICARE

## 2021-10-29 ENCOUNTER — CLINICAL SUPPORT (OUTPATIENT)
Dept: OBSTETRICS AND GYNECOLOGY | Facility: CLINIC | Age: 41
End: 2021-10-29
Payer: MEDICARE

## 2021-10-29 PROCEDURE — 96372 THER/PROPH/DIAG INJ SC/IM: CPT | Mod: PBBFAC,PN

## 2021-10-29 RX ADMIN — ESTRADIOL VALERATE 20 MG: 20 INJECTION INTRAMUSCULAR at 02:10

## 2021-11-10 ENCOUNTER — PATIENT MESSAGE (OUTPATIENT)
Dept: GASTROENTEROLOGY | Facility: CLINIC | Age: 41
End: 2021-11-10
Payer: MEDICARE

## 2021-11-12 ENCOUNTER — PATIENT MESSAGE (OUTPATIENT)
Dept: INTERNAL MEDICINE | Facility: CLINIC | Age: 41
End: 2021-11-12
Payer: MEDICARE

## 2021-11-12 DIAGNOSIS — F41.8 OTHER SPECIFIED ANXIETY DISORDERS: Primary | ICD-10-CM

## 2021-11-15 ENCOUNTER — TELEPHONE (OUTPATIENT)
Dept: BEHAVIORAL HEALTH | Facility: OTHER | Age: 41
End: 2021-11-15
Payer: MEDICARE

## 2021-11-15 ENCOUNTER — PATIENT MESSAGE (OUTPATIENT)
Dept: BEHAVIORAL HEALTH | Facility: OTHER | Age: 41
End: 2021-11-15
Payer: MEDICARE

## 2021-11-23 RX ORDER — PROMETHAZINE HYDROCHLORIDE 25 MG/1
TABLET ORAL
Qty: 30 TABLET | Refills: 2 | Status: ON HOLD | OUTPATIENT
Start: 2021-11-23 | End: 2022-03-24 | Stop reason: SDUPTHER

## 2021-11-28 ENCOUNTER — PATIENT MESSAGE (OUTPATIENT)
Dept: OBSTETRICS AND GYNECOLOGY | Facility: CLINIC | Age: 41
End: 2021-11-28
Payer: MEDICARE

## 2021-11-30 ENCOUNTER — CLINICAL SUPPORT (OUTPATIENT)
Dept: OBSTETRICS AND GYNECOLOGY | Facility: CLINIC | Age: 41
End: 2021-11-30
Payer: MEDICARE

## 2021-11-30 PROCEDURE — 96372 THER/PROPH/DIAG INJ SC/IM: CPT | Mod: PBBFAC,PN

## 2021-11-30 RX ADMIN — ESTRADIOL VALERATE 20 MG: 20 INJECTION INTRAMUSCULAR at 02:11

## 2021-12-05 ENCOUNTER — PATIENT MESSAGE (OUTPATIENT)
Dept: INTERNAL MEDICINE | Facility: CLINIC | Age: 41
End: 2021-12-05
Payer: MEDICARE

## 2021-12-06 ENCOUNTER — PATIENT MESSAGE (OUTPATIENT)
Dept: GASTROENTEROLOGY | Facility: CLINIC | Age: 41
End: 2021-12-06
Payer: MEDICARE

## 2021-12-06 ENCOUNTER — LAB VISIT (OUTPATIENT)
Dept: LAB | Facility: HOSPITAL | Age: 41
End: 2021-12-06
Attending: INTERNAL MEDICINE
Payer: MEDICARE

## 2021-12-06 ENCOUNTER — INFUSION (OUTPATIENT)
Dept: INFECTIOUS DISEASES | Facility: HOSPITAL | Age: 41
End: 2021-12-06
Attending: INTERNAL MEDICINE
Payer: MEDICARE

## 2021-12-06 ENCOUNTER — TELEPHONE (OUTPATIENT)
Dept: GASTROENTEROLOGY | Facility: CLINIC | Age: 41
End: 2021-12-06
Payer: MEDICARE

## 2021-12-06 ENCOUNTER — PATIENT MESSAGE (OUTPATIENT)
Dept: BEHAVIORAL HEALTH | Facility: OTHER | Age: 41
End: 2021-12-06
Payer: MEDICARE

## 2021-12-06 VITALS
WEIGHT: 114.63 LBS | TEMPERATURE: 97 F | BODY MASS INDEX: 21.64 KG/M2 | HEART RATE: 71 BPM | SYSTOLIC BLOOD PRESSURE: 134 MMHG | HEIGHT: 61 IN | RESPIRATION RATE: 15 BRPM | OXYGEN SATURATION: 97 % | DIASTOLIC BLOOD PRESSURE: 82 MMHG

## 2021-12-06 DIAGNOSIS — K50.019 CROHN'S DISEASE OF SMALL INTESTINE WITH COMPLICATION: ICD-10-CM

## 2021-12-06 DIAGNOSIS — K50.019 CROHN'S DISEASE OF SMALL INTESTINE WITH COMPLICATION: Primary | ICD-10-CM

## 2021-12-06 DIAGNOSIS — K50.00 CROHN'S DISEASE OF SMALL INTESTINE WITHOUT COMPLICATION: ICD-10-CM

## 2021-12-06 DIAGNOSIS — I87.8 POOR VENOUS ACCESS: Primary | ICD-10-CM

## 2021-12-06 LAB
ALBUMIN SERPL BCP-MCNC: 3.8 G/DL (ref 3.5–5.2)
ALP SERPL-CCNC: 105 U/L (ref 55–135)
ALT SERPL W/O P-5'-P-CCNC: 18 U/L (ref 10–44)
ANION GAP SERPL CALC-SCNC: 13 MMOL/L (ref 8–16)
AST SERPL-CCNC: 22 U/L (ref 10–40)
BASOPHILS # BLD AUTO: 0.05 K/UL (ref 0–0.2)
BASOPHILS NFR BLD: 0.5 % (ref 0–1.9)
BILIRUB SERPL-MCNC: 0.5 MG/DL (ref 0.1–1)
BUN SERPL-MCNC: 11 MG/DL (ref 6–20)
CALCIUM SERPL-MCNC: 9.4 MG/DL (ref 8.7–10.5)
CHLORIDE SERPL-SCNC: 104 MMOL/L (ref 95–110)
CO2 SERPL-SCNC: 19 MMOL/L (ref 23–29)
CREAT SERPL-MCNC: 0.7 MG/DL (ref 0.5–1.4)
CRP SERPL-MCNC: 3.4 MG/L (ref 0–8.2)
DIFFERENTIAL METHOD: NORMAL
EOSINOPHIL # BLD AUTO: 0.1 K/UL (ref 0–0.5)
EOSINOPHIL NFR BLD: 0.7 % (ref 0–8)
ERYTHROCYTE [DISTWIDTH] IN BLOOD BY AUTOMATED COUNT: 12.8 % (ref 11.5–14.5)
EST. GFR  (AFRICAN AMERICAN): >60 ML/MIN/1.73 M^2
EST. GFR  (NON AFRICAN AMERICAN): >60 ML/MIN/1.73 M^2
GLUCOSE SERPL-MCNC: 82 MG/DL (ref 70–110)
HCT VFR BLD AUTO: 38.3 % (ref 37–48.5)
HGB BLD-MCNC: 12.8 G/DL (ref 12–16)
IMM GRANULOCYTES # BLD AUTO: 0.03 K/UL (ref 0–0.04)
IMM GRANULOCYTES NFR BLD AUTO: 0.3 % (ref 0–0.5)
LYMPHOCYTES # BLD AUTO: 2.7 K/UL (ref 1–4.8)
LYMPHOCYTES NFR BLD: 29.8 % (ref 18–48)
MCH RBC QN AUTO: 31 PG (ref 27–31)
MCHC RBC AUTO-ENTMCNC: 33.4 G/DL (ref 32–36)
MCV RBC AUTO: 93 FL (ref 82–98)
MONOCYTES # BLD AUTO: 0.6 K/UL (ref 0.3–1)
MONOCYTES NFR BLD: 6.8 % (ref 4–15)
NEUTROPHILS # BLD AUTO: 5.7 K/UL (ref 1.8–7.7)
NEUTROPHILS NFR BLD: 61.9 % (ref 38–73)
NRBC BLD-RTO: 0 /100 WBC
PLATELET # BLD AUTO: 285 K/UL (ref 150–450)
PMV BLD AUTO: 11 FL (ref 9.2–12.9)
POTASSIUM SERPL-SCNC: 3.9 MMOL/L (ref 3.5–5.1)
PROT SERPL-MCNC: 7.7 G/DL (ref 6–8.4)
RBC # BLD AUTO: 4.13 M/UL (ref 4–5.4)
SODIUM SERPL-SCNC: 136 MMOL/L (ref 136–145)
WBC # BLD AUTO: 9.14 K/UL (ref 3.9–12.7)

## 2021-12-06 PROCEDURE — 25000003 PHARM REV CODE 250: Performed by: INTERNAL MEDICINE

## 2021-12-06 PROCEDURE — 83993 ASSAY FOR CALPROTECTIN FECAL: CPT | Performed by: INTERNAL MEDICINE

## 2021-12-06 PROCEDURE — 80053 COMPREHEN METABOLIC PANEL: CPT | Performed by: INTERNAL MEDICINE

## 2021-12-06 PROCEDURE — 96360 HYDRATION IV INFUSION INIT: CPT

## 2021-12-06 PROCEDURE — 36591 DRAW BLOOD OFF VENOUS DEVICE: CPT

## 2021-12-06 PROCEDURE — 36415 COLL VENOUS BLD VENIPUNCTURE: CPT | Performed by: INTERNAL MEDICINE

## 2021-12-06 PROCEDURE — 86140 C-REACTIVE PROTEIN: CPT | Performed by: INTERNAL MEDICINE

## 2021-12-06 PROCEDURE — 85025 COMPLETE CBC W/AUTO DIFF WBC: CPT | Performed by: INTERNAL MEDICINE

## 2021-12-06 PROCEDURE — 63600175 PHARM REV CODE 636 W HCPCS: Performed by: INTERNAL MEDICINE

## 2021-12-06 RX ORDER — SODIUM CHLORIDE 0.9 % (FLUSH) 0.9 %
10 SYRINGE (ML) INJECTION
Status: CANCELLED | OUTPATIENT
Start: 2021-12-20

## 2021-12-06 RX ORDER — IPRATROPIUM BROMIDE AND ALBUTEROL SULFATE 2.5; .5 MG/3ML; MG/3ML
3 SOLUTION RESPIRATORY (INHALATION)
Status: CANCELLED | OUTPATIENT
Start: 2021-12-20

## 2021-12-06 RX ORDER — ACETAMINOPHEN 325 MG/1
650 TABLET ORAL
Status: CANCELLED | OUTPATIENT
Start: 2021-12-20

## 2021-12-06 RX ORDER — HEPARIN 100 UNIT/ML
500 SYRINGE INTRAVENOUS
Status: CANCELLED | OUTPATIENT
Start: 2021-12-20

## 2021-12-06 RX ORDER — METHYLPREDNISOLONE SOD SUCC 125 MG
40 VIAL (EA) INJECTION
Status: CANCELLED | OUTPATIENT
Start: 2021-12-20

## 2021-12-06 RX ORDER — SODIUM CHLORIDE 0.9 % (FLUSH) 0.9 %
10 SYRINGE (ML) INJECTION
Status: DISCONTINUED | OUTPATIENT
Start: 2021-12-06 | End: 2021-12-06 | Stop reason: HOSPADM

## 2021-12-06 RX ORDER — EPINEPHRINE 1 MG/ML
0.3 INJECTION, SOLUTION, CONCENTRATE INTRAVENOUS
Status: CANCELLED | OUTPATIENT
Start: 2021-12-20

## 2021-12-06 RX ORDER — DIPHENHYDRAMINE HYDROCHLORIDE 50 MG/ML
25 INJECTION INTRAMUSCULAR; INTRAVENOUS
Status: CANCELLED | OUTPATIENT
Start: 2021-12-20

## 2021-12-06 RX ORDER — HEPARIN 100 UNIT/ML
500 SYRINGE INTRAVENOUS
Status: DISCONTINUED | OUTPATIENT
Start: 2021-12-06 | End: 2021-12-06 | Stop reason: HOSPADM

## 2021-12-06 RX ADMIN — HEPARIN 500 UNITS: 100 SYRINGE at 02:12

## 2021-12-06 RX ADMIN — SODIUM CHLORIDE 1000 ML: 0.9 INJECTION, SOLUTION INTRAVENOUS at 01:12

## 2021-12-07 ENCOUNTER — PATIENT MESSAGE (OUTPATIENT)
Dept: INTERNAL MEDICINE | Facility: CLINIC | Age: 41
End: 2021-12-07
Payer: MEDICARE

## 2021-12-07 ENCOUNTER — TELEPHONE (OUTPATIENT)
Dept: GASTROENTEROLOGY | Facility: CLINIC | Age: 41
End: 2021-12-07
Payer: MEDICARE

## 2021-12-07 ENCOUNTER — PATIENT MESSAGE (OUTPATIENT)
Dept: GASTROENTEROLOGY | Facility: CLINIC | Age: 41
End: 2021-12-07
Payer: MEDICARE

## 2021-12-07 DIAGNOSIS — Z11.1 SCREENING-PULMONARY TB: Primary | ICD-10-CM

## 2021-12-08 ENCOUNTER — LAB VISIT (OUTPATIENT)
Dept: LAB | Facility: OTHER | Age: 41
End: 2021-12-08
Attending: INTERNAL MEDICINE
Payer: MEDICARE

## 2021-12-08 DIAGNOSIS — Z11.1 SCREENING-PULMONARY TB: ICD-10-CM

## 2021-12-08 PROCEDURE — 36415 COLL VENOUS BLD VENIPUNCTURE: CPT | Performed by: INTERNAL MEDICINE

## 2021-12-08 PROCEDURE — 86480 TB TEST CELL IMMUN MEASURE: CPT | Performed by: INTERNAL MEDICINE

## 2021-12-10 LAB
CALPROTECTIN STL-MCNT: <27.1 MCG/G
GAMMA INTERFERON BACKGROUND BLD IA-ACNC: 0.03 IU/ML
M TB IFN-G CD4+ BCKGRND COR BLD-ACNC: 0 IU/ML
MITOGEN IGNF BCKGRD COR BLD-ACNC: 8.87 IU/ML
TB GOLD PLUS: NEGATIVE
TB2 - NIL: 0.01 IU/ML

## 2021-12-20 ENCOUNTER — PATIENT MESSAGE (OUTPATIENT)
Dept: WOUND CARE | Facility: CLINIC | Age: 41
End: 2021-12-20
Payer: MEDICARE

## 2021-12-21 ENCOUNTER — PATIENT MESSAGE (OUTPATIENT)
Dept: BEHAVIORAL HEALTH | Facility: OTHER | Age: 41
End: 2021-12-21
Payer: MEDICARE

## 2021-12-22 ENCOUNTER — PATIENT MESSAGE (OUTPATIENT)
Dept: ELECTROPHYSIOLOGY | Facility: CLINIC | Age: 41
End: 2021-12-22
Payer: MEDICARE

## 2021-12-22 DIAGNOSIS — I49.8 OTHER SPECIFIED CARDIAC ARRHYTHMIAS: Primary | ICD-10-CM

## 2021-12-22 RX ORDER — NADOLOL 40 MG/1
40 TABLET ORAL DAILY
Qty: 30 TABLET | Refills: 11 | Status: CANCELLED | OUTPATIENT
Start: 2021-12-22 | End: 2022-12-22

## 2021-12-22 RX ORDER — NADOLOL 40 MG/1
40 TABLET ORAL DAILY
Qty: 30 TABLET | Refills: 11 | Status: SHIPPED | OUTPATIENT
Start: 2021-12-22 | End: 2022-12-20 | Stop reason: SDUPTHER

## 2021-12-25 ENCOUNTER — PATIENT MESSAGE (OUTPATIENT)
Dept: OBSTETRICS AND GYNECOLOGY | Facility: CLINIC | Age: 41
End: 2021-12-25
Payer: MEDICARE

## 2021-12-27 ENCOUNTER — PATIENT MESSAGE (OUTPATIENT)
Dept: ADMINISTRATIVE | Facility: OTHER | Age: 41
End: 2021-12-27
Payer: MEDICARE

## 2021-12-27 ENCOUNTER — HOSPITAL ENCOUNTER (OUTPATIENT)
Dept: CARDIOLOGY | Facility: CLINIC | Age: 41
Discharge: HOME | End: 2021-12-27
Payer: MEDICARE

## 2021-12-27 ENCOUNTER — OFFICE VISIT (OUTPATIENT)
Dept: ELECTROPHYSIOLOGY | Facility: CLINIC | Age: 41
End: 2021-12-27
Payer: MEDICARE

## 2021-12-27 VITALS
HEART RATE: 52 BPM | DIASTOLIC BLOOD PRESSURE: 68 MMHG | BODY MASS INDEX: 21.23 KG/M2 | HEIGHT: 61 IN | SYSTOLIC BLOOD PRESSURE: 102 MMHG | WEIGHT: 112.44 LBS

## 2021-12-27 DIAGNOSIS — Z87.898 HISTORY OF PROLONGED Q-T INTERVAL ON ECG: Primary | ICD-10-CM

## 2021-12-27 DIAGNOSIS — R00.0 SINUS TACHYCARDIA: ICD-10-CM

## 2021-12-27 DIAGNOSIS — Z20.822 ENCOUNTER FOR LABORATORY TESTING FOR COVID-19 VIRUS: ICD-10-CM

## 2021-12-27 DIAGNOSIS — I49.8 OTHER SPECIFIED CARDIAC ARRHYTHMIAS: ICD-10-CM

## 2021-12-27 PROBLEM — I47.19 ATRIAL TACHYCARDIA: Status: RESOLVED | Noted: 2018-02-03 | Resolved: 2021-12-27

## 2021-12-27 PROCEDURE — 93005 ELECTROCARDIOGRAM TRACING: CPT | Mod: PBBFAC | Performed by: INTERNAL MEDICINE

## 2021-12-27 PROCEDURE — 99999 PR PBB SHADOW E&M-EST. PATIENT-LVL III: CPT | Mod: PBBFAC,,, | Performed by: INTERNAL MEDICINE

## 2021-12-27 PROCEDURE — 93010 RHYTHM STRIP: ICD-10-PCS | Mod: S$PBB,,, | Performed by: INTERNAL MEDICINE

## 2021-12-27 PROCEDURE — 99999 PR PBB SHADOW E&M-EST. PATIENT-LVL III: ICD-10-PCS | Mod: PBBFAC,,, | Performed by: INTERNAL MEDICINE

## 2021-12-27 PROCEDURE — 99213 OFFICE O/P EST LOW 20 MIN: CPT | Mod: PBBFAC | Performed by: INTERNAL MEDICINE

## 2021-12-27 PROCEDURE — 99214 OFFICE O/P EST MOD 30 MIN: CPT | Mod: S$PBB,,, | Performed by: INTERNAL MEDICINE

## 2021-12-27 PROCEDURE — 93010 ELECTROCARDIOGRAM REPORT: CPT | Mod: S$PBB,,, | Performed by: INTERNAL MEDICINE

## 2021-12-27 PROCEDURE — 99214 PR OFFICE/OUTPT VISIT, EST, LEVL IV, 30-39 MIN: ICD-10-PCS | Mod: S$PBB,,, | Performed by: INTERNAL MEDICINE

## 2021-12-28 ENCOUNTER — CLINICAL SUPPORT (OUTPATIENT)
Dept: OBSTETRICS AND GYNECOLOGY | Facility: CLINIC | Age: 41
End: 2021-12-28
Payer: MEDICARE

## 2021-12-28 PROCEDURE — 96372 THER/PROPH/DIAG INJ SC/IM: CPT | Mod: PBBFAC,PN

## 2021-12-28 RX ADMIN — ESTRADIOL VALERATE 20 MG: 20 INJECTION INTRAMUSCULAR at 11:12

## 2021-12-30 ENCOUNTER — PATIENT MESSAGE (OUTPATIENT)
Dept: INTERNAL MEDICINE | Facility: CLINIC | Age: 41
End: 2021-12-30
Payer: MEDICARE

## 2021-12-30 ENCOUNTER — PATIENT MESSAGE (OUTPATIENT)
Dept: ADMINISTRATIVE | Facility: OTHER | Age: 41
End: 2021-12-30
Payer: MEDICARE

## 2021-12-30 ENCOUNTER — LAB VISIT (OUTPATIENT)
Dept: LAB | Facility: OTHER | Age: 41
End: 2021-12-30
Payer: MEDICARE

## 2021-12-30 DIAGNOSIS — Z20.822 ENCOUNTER FOR LABORATORY TESTING FOR COVID-19 VIRUS: ICD-10-CM

## 2021-12-30 PROCEDURE — U0003 INFECTIOUS AGENT DETECTION BY NUCLEIC ACID (DNA OR RNA); SEVERE ACUTE RESPIRATORY SYNDROME CORONAVIRUS 2 (SARS-COV-2) (CORONAVIRUS DISEASE [COVID-19]), AMPLIFIED PROBE TECHNIQUE, MAKING USE OF HIGH THROUGHPUT TECHNOLOGIES AS DESCRIBED BY CMS-2020-01-R: HCPCS | Performed by: NURSE PRACTITIONER

## 2022-01-01 LAB
SARS-COV-2 RNA RESP QL NAA+PROBE: NORMAL
TEST PERFORMANCE INFO SPEC: NORMAL

## 2022-01-03 ENCOUNTER — PATIENT MESSAGE (OUTPATIENT)
Dept: BEHAVIORAL HEALTH | Facility: OTHER | Age: 42
End: 2022-01-03
Payer: MEDICARE

## 2022-01-03 ENCOUNTER — TELEPHONE (OUTPATIENT)
Dept: GASTROENTEROLOGY | Facility: CLINIC | Age: 42
End: 2022-01-03
Payer: MEDICARE

## 2022-01-03 ENCOUNTER — LAB VISIT (OUTPATIENT)
Dept: LAB | Facility: OTHER | Age: 42
End: 2022-01-03
Payer: MEDICARE

## 2022-01-03 DIAGNOSIS — Z20.822 ENCOUNTER FOR LABORATORY TESTING FOR COVID-19 VIRUS: ICD-10-CM

## 2022-01-03 PROCEDURE — U0003 INFECTIOUS AGENT DETECTION BY NUCLEIC ACID (DNA OR RNA); SEVERE ACUTE RESPIRATORY SYNDROME CORONAVIRUS 2 (SARS-COV-2) (CORONAVIRUS DISEASE [COVID-19]), AMPLIFIED PROBE TECHNIQUE, MAKING USE OF HIGH THROUGHPUT TECHNOLOGIES AS DESCRIBED BY CMS-2020-01-R: HCPCS | Performed by: NURSE PRACTITIONER

## 2022-01-03 NOTE — TELEPHONE ENCOUNTER
----- Message from Bob Brewer sent at 1/3/2022 11:13 AM CST -----  Regarding: missed call from [facility]      The Pt missed your call and would like a call back please.     # [phone]

## 2022-01-03 NOTE — TELEPHONE ENCOUNTER
Spoke with patient.   Confirmed her virtual visit appointment with Dr.James Ross for 1/4/2022.   Oralia

## 2022-01-04 ENCOUNTER — OFFICE VISIT (OUTPATIENT)
Dept: GASTROENTEROLOGY | Facility: CLINIC | Age: 42
End: 2022-01-04
Payer: MEDICARE

## 2022-01-04 DIAGNOSIS — K50.019 CROHN'S DISEASE OF SMALL INTESTINE WITH COMPLICATION: Primary | ICD-10-CM

## 2022-01-04 PROCEDURE — 99213 OFFICE O/P EST LOW 20 MIN: CPT | Mod: 95,,, | Performed by: INTERNAL MEDICINE

## 2022-01-04 PROCEDURE — 99213 PR OFFICE/OUTPT VISIT, EST, LEVL III, 20-29 MIN: ICD-10-PCS | Mod: 95,,, | Performed by: INTERNAL MEDICINE

## 2022-01-04 RX ORDER — PANTOPRAZOLE SODIUM 40 MG/1
40 TABLET, DELAYED RELEASE ORAL 2 TIMES DAILY
Qty: 60 TABLET | Refills: 12 | Status: SHIPPED | OUTPATIENT
Start: 2022-01-04 | End: 2023-04-05 | Stop reason: SDUPTHER

## 2022-01-04 NOTE — PROGRESS NOTES
The patient location is: home  The chief complaint leading to consultation is: f/u    Visit type: audiovisual    Face to Face time with patient: 12 min  25 minutes of total time spent on the encounter, which includes face to face time and non-face to face time preparing to see the patient (eg, review of tests), Obtaining and/or reviewing separately obtained history, Documenting clinical information in the electronic or other health record, Independently interpreting results (not separately reported) and communicating results to the patient/family/caregiver, or Care coordination (not separately reported).         Each patient to whom he or she provides medical services by telemedicine is:  (1) informed of the relationship between the physician and patient and the respective role of any other health care provider with respect to management of the patient; and (2) notified that he or she may decline to receive medical services by telemedicine and may withdraw from such care at any time.    Notes: Complicated Crohn's patient.  Multiple bowel resections.  End ileostomy.  Always a problem with high output.  Recent treatment has been with Entyvio.  Entyvio has been halted because of concerns that it played a role in palpitations, and prolonged QT.  She has been evaluated by Cardiology for this.    The visit now is prompted by number of new symptoms.  She is concerned about a possible flare.  She has had increased nausea.  A lot of increased heartburn.  Partial relief from Pepcid.  Noticed hiccups which is unusual for her.  She has lost about 10 lb in weight.  She is having variable diarrhea.  She has a baseline of diarrhea but some increased recently especially after meals.  Reports no abdominal pain of note.    As noted Entyvio has been held and so she has not received that on schedule she did have some chest pain and palpitations after an infusion and did have prolonged QT.  She has been put on Nadolol    Assessment  1.  Crohn's disease.  Our last assessment with MR enterography approximately 1 year ago showed the disease to not be active on the Entyvio at the time.  She is having increased symptoms now off the Entyvio and that certainly may represent a flare.  I need to clarify with Cardiology that now that she is on a beta-blocker can I restart the Entyvio would be better not to restarted I want to check labs now.  It sounds like there was more reflux now especially with the nausea and the hiccups.  I will start her on pantoprazole twice a day.  If she is feeling better from that within a couple weeks we can lower that to once a day.  If we need to make a change in her Crohn's therapy, then I opened the idea that we would transfer her to the IBD team and I explained the rationale for that.  And she understands.  She was already aware that that is being done for some of the IBD patients, especially the complicated once    Recommendation  1. CBC CMP CRP now  2. Pantoprazole twice a day  3. Reach out to Cardiology with query

## 2022-01-05 ENCOUNTER — PATIENT MESSAGE (OUTPATIENT)
Dept: GASTROENTEROLOGY | Facility: CLINIC | Age: 42
End: 2022-01-05
Payer: MEDICARE

## 2022-01-05 ENCOUNTER — LAB VISIT (OUTPATIENT)
Dept: LAB | Facility: HOSPITAL | Age: 42
End: 2022-01-05
Attending: INTERNAL MEDICINE
Payer: MEDICARE

## 2022-01-05 DIAGNOSIS — K50.019 CROHN'S DISEASE OF SMALL INTESTINE WITH COMPLICATION: ICD-10-CM

## 2022-01-05 LAB
ALBUMIN SERPL BCP-MCNC: 3.7 G/DL (ref 3.5–5.2)
ALP SERPL-CCNC: 89 U/L (ref 55–135)
ALT SERPL W/O P-5'-P-CCNC: 13 U/L (ref 10–44)
ANION GAP SERPL CALC-SCNC: 9 MMOL/L (ref 8–16)
AST SERPL-CCNC: 23 U/L (ref 10–40)
BASOPHILS # BLD AUTO: 0.03 K/UL (ref 0–0.2)
BASOPHILS NFR BLD: 0.5 % (ref 0–1.9)
BILIRUB SERPL-MCNC: 0.6 MG/DL (ref 0.1–1)
BUN SERPL-MCNC: 7 MG/DL (ref 6–20)
CALCIUM SERPL-MCNC: 9.3 MG/DL (ref 8.7–10.5)
CHLORIDE SERPL-SCNC: 104 MMOL/L (ref 95–110)
CO2 SERPL-SCNC: 22 MMOL/L (ref 23–29)
CREAT SERPL-MCNC: 0.8 MG/DL (ref 0.5–1.4)
CRP SERPL-MCNC: 2.6 MG/L (ref 0–8.2)
DIFFERENTIAL METHOD: NORMAL
EOSINOPHIL # BLD AUTO: 0.1 K/UL (ref 0–0.5)
EOSINOPHIL NFR BLD: 0.8 % (ref 0–8)
ERYTHROCYTE [DISTWIDTH] IN BLOOD BY AUTOMATED COUNT: 12.5 % (ref 11.5–14.5)
EST. GFR  (AFRICAN AMERICAN): >60 ML/MIN/1.73 M^2
EST. GFR  (NON AFRICAN AMERICAN): >60 ML/MIN/1.73 M^2
GLUCOSE SERPL-MCNC: 99 MG/DL (ref 70–110)
HCT VFR BLD AUTO: 42.4 % (ref 37–48.5)
HGB BLD-MCNC: 13.9 G/DL (ref 12–16)
IMM GRANULOCYTES # BLD AUTO: 0.01 K/UL (ref 0–0.04)
IMM GRANULOCYTES NFR BLD AUTO: 0.2 % (ref 0–0.5)
LYMPHOCYTES # BLD AUTO: 2 K/UL (ref 1–4.8)
LYMPHOCYTES NFR BLD: 30.7 % (ref 18–48)
MCH RBC QN AUTO: 30.5 PG (ref 27–31)
MCHC RBC AUTO-ENTMCNC: 32.8 G/DL (ref 32–36)
MCV RBC AUTO: 93 FL (ref 82–98)
MONOCYTES # BLD AUTO: 0.6 K/UL (ref 0.3–1)
MONOCYTES NFR BLD: 8.9 % (ref 4–15)
NEUTROPHILS # BLD AUTO: 3.9 K/UL (ref 1.8–7.7)
NEUTROPHILS NFR BLD: 58.9 % (ref 38–73)
NRBC BLD-RTO: 0 /100 WBC
PLATELET # BLD AUTO: 271 K/UL (ref 150–450)
PMV BLD AUTO: 10.4 FL (ref 9.2–12.9)
POTASSIUM SERPL-SCNC: 4.2 MMOL/L (ref 3.5–5.1)
PROT SERPL-MCNC: 7.4 G/DL (ref 6–8.4)
RBC # BLD AUTO: 4.55 M/UL (ref 4–5.4)
SARS-COV-2 RNA RESP QL NAA+PROBE: NORMAL
SODIUM SERPL-SCNC: 135 MMOL/L (ref 136–145)
TEST PERFORMANCE INFO SPEC: NORMAL
WBC # BLD AUTO: 6.65 K/UL (ref 3.9–12.7)

## 2022-01-05 PROCEDURE — 36415 COLL VENOUS BLD VENIPUNCTURE: CPT | Performed by: INTERNAL MEDICINE

## 2022-01-05 PROCEDURE — 80053 COMPREHEN METABOLIC PANEL: CPT | Performed by: INTERNAL MEDICINE

## 2022-01-05 PROCEDURE — 85025 COMPLETE CBC W/AUTO DIFF WBC: CPT | Performed by: INTERNAL MEDICINE

## 2022-01-05 PROCEDURE — 86140 C-REACTIVE PROTEIN: CPT | Performed by: INTERNAL MEDICINE

## 2022-01-06 ENCOUNTER — LAB VISIT (OUTPATIENT)
Dept: LAB | Facility: OTHER | Age: 42
End: 2022-01-06
Payer: MEDICARE

## 2022-01-06 ENCOUNTER — PATIENT MESSAGE (OUTPATIENT)
Dept: ADMINISTRATIVE | Facility: OTHER | Age: 42
End: 2022-01-06
Payer: MEDICARE

## 2022-01-06 DIAGNOSIS — Z20.822 ENCOUNTER FOR LABORATORY TESTING FOR COVID-19 VIRUS: ICD-10-CM

## 2022-01-06 PROCEDURE — U0003 INFECTIOUS AGENT DETECTION BY NUCLEIC ACID (DNA OR RNA); SEVERE ACUTE RESPIRATORY SYNDROME CORONAVIRUS 2 (SARS-COV-2) (CORONAVIRUS DISEASE [COVID-19]), AMPLIFIED PROBE TECHNIQUE, MAKING USE OF HIGH THROUGHPUT TECHNOLOGIES AS DESCRIBED BY CMS-2020-01-R: HCPCS | Performed by: NURSE PRACTITIONER

## 2022-01-07 ENCOUNTER — PATIENT MESSAGE (OUTPATIENT)
Dept: BEHAVIORAL HEALTH | Facility: OTHER | Age: 42
End: 2022-01-07
Payer: MEDICARE

## 2022-01-08 LAB
SARS-COV-2 RNA RESP QL NAA+PROBE: NOT DETECTED
SARS-COV-2- CYCLE NUMBER: NORMAL

## 2022-01-10 ENCOUNTER — PATIENT MESSAGE (OUTPATIENT)
Dept: BEHAVIORAL HEALTH | Facility: OTHER | Age: 42
End: 2022-01-10
Payer: MEDICARE

## 2022-01-18 ENCOUNTER — LAB VISIT (OUTPATIENT)
Dept: LAB | Facility: OTHER | Age: 42
End: 2022-01-18
Payer: MEDICARE

## 2022-01-18 DIAGNOSIS — Z20.822 ENCOUNTER FOR LABORATORY TESTING FOR COVID-19 VIRUS: ICD-10-CM

## 2022-01-18 LAB
SARS-COV-2 RNA RESP QL NAA+PROBE: NOT DETECTED
SARS-COV-2- CYCLE NUMBER: NORMAL

## 2022-01-18 PROCEDURE — U0003 INFECTIOUS AGENT DETECTION BY NUCLEIC ACID (DNA OR RNA); SEVERE ACUTE RESPIRATORY SYNDROME CORONAVIRUS 2 (SARS-COV-2) (CORONAVIRUS DISEASE [COVID-19]), AMPLIFIED PROBE TECHNIQUE, MAKING USE OF HIGH THROUGHPUT TECHNOLOGIES AS DESCRIBED BY CMS-2020-01-R: HCPCS

## 2022-01-19 ENCOUNTER — PATIENT MESSAGE (OUTPATIENT)
Dept: INTERNAL MEDICINE | Facility: CLINIC | Age: 42
End: 2022-01-19
Payer: MEDICARE

## 2022-01-20 ENCOUNTER — LAB VISIT (OUTPATIENT)
Dept: LAB | Facility: OTHER | Age: 42
End: 2022-01-20
Payer: MEDICARE

## 2022-01-20 DIAGNOSIS — Z20.822 ENCOUNTER FOR LABORATORY TESTING FOR COVID-19 VIRUS: ICD-10-CM

## 2022-01-20 LAB
SARS-COV-2 RNA RESP QL NAA+PROBE: NOT DETECTED
SARS-COV-2- CYCLE NUMBER: NORMAL

## 2022-01-20 PROCEDURE — U0003 INFECTIOUS AGENT DETECTION BY NUCLEIC ACID (DNA OR RNA); SEVERE ACUTE RESPIRATORY SYNDROME CORONAVIRUS 2 (SARS-COV-2) (CORONAVIRUS DISEASE [COVID-19]), AMPLIFIED PROBE TECHNIQUE, MAKING USE OF HIGH THROUGHPUT TECHNOLOGIES AS DESCRIBED BY CMS-2020-01-R: HCPCS | Performed by: EMERGENCY MEDICINE

## 2022-01-27 ENCOUNTER — OFFICE VISIT (OUTPATIENT)
Dept: INTERNAL MEDICINE | Facility: CLINIC | Age: 42
End: 2022-01-27
Payer: MEDICARE

## 2022-01-27 ENCOUNTER — PATIENT MESSAGE (OUTPATIENT)
Dept: OBSTETRICS AND GYNECOLOGY | Facility: CLINIC | Age: 42
End: 2022-01-27
Payer: MEDICARE

## 2022-01-27 VITALS
BODY MASS INDEX: 21.24 KG/M2 | OXYGEN SATURATION: 100 % | HEART RATE: 70 BPM | SYSTOLIC BLOOD PRESSURE: 118 MMHG | DIASTOLIC BLOOD PRESSURE: 68 MMHG | WEIGHT: 112.44 LBS

## 2022-01-27 DIAGNOSIS — R22.41 HIP MASS, RIGHT: Primary | ICD-10-CM

## 2022-01-27 PROCEDURE — 99214 OFFICE O/P EST MOD 30 MIN: CPT | Mod: PBBFAC | Performed by: PHYSICIAN ASSISTANT

## 2022-01-27 PROCEDURE — 99214 PR OFFICE/OUTPT VISIT, EST, LEVL IV, 30-39 MIN: ICD-10-PCS | Mod: S$PBB,,, | Performed by: PHYSICIAN ASSISTANT

## 2022-01-27 PROCEDURE — 99999 PR PBB SHADOW E&M-EST. PATIENT-LVL IV: CPT | Mod: PBBFAC,,, | Performed by: PHYSICIAN ASSISTANT

## 2022-01-27 PROCEDURE — 99999 PR PBB SHADOW E&M-EST. PATIENT-LVL IV: ICD-10-PCS | Mod: PBBFAC,,, | Performed by: PHYSICIAN ASSISTANT

## 2022-01-27 PROCEDURE — 99214 OFFICE O/P EST MOD 30 MIN: CPT | Mod: S$PBB,,, | Performed by: PHYSICIAN ASSISTANT

## 2022-01-27 NOTE — PROGRESS NOTES
INTERNAL MEDICINE URGENT VISIT NOTE    CHIEF COMPLAINT     Chief Complaint   Patient presents with    Mass       HPI     Ivy Salgado is a 41 y.o. female who presents for an urgent visit today.    PCP is Kalia Astorga MD, patient is new to me.     Painless mass on the right gluteal region that has been prsent for 2 years. No changes noted. Seen on lumbar CRISTIANO orddered by chiropractor -radiology read recommended CT for further charactieruzation.   Will start with US given pt's history of multple CT's and difficulty with IV access -pt has power port in left chest wall.   Will determine if CT still recommended after US has been reviewed by Radiology.       Past Medical History:  Past Medical History:   Diagnosis Date    Abnormal Pap smear 2007    Abnormal Pap smear 5/26/2011    Anemia     Anxiety     Arthritis     C. difficile diarrhea     Crohn's disease     Depression 8/5/2017    Encounter for blood transfusion     Genital HSV     History of colposcopy with cervical biopsy 2007 and 7/2011 2007-LYLA I  and 7/2011- LYLA I    Hypertension     Kidney stone     Kidney stone     Melanoma     Recurrent UTI 4/3/2013    S/P ileostomy 7/9/2012    Sterilization 6/23/2012       Home Medications:  Prior to Admission medications    Medication Sig Start Date End Date Taking? Authorizing Provider   aspirin 81 MG Chew Take 81 mg by mouth once daily.    Historical Provider   clonazePAM (KLONOPIN) 1 MG tablet TAKE 1 AND 1/2 TABLET BY MOUTH TWICE DAILY AS NEEDED FOR ANXIETY 12/22/21   Salvatore Del Rosario MD   diphenoxylate-atropine 2.5-0.025 mg (LOMOTIL) 2.5-0.025 mg per tablet Take 1 tablet by mouth 4 (four) times daily as needed for Diarrhea. for diarrhea 8/5/21   Parish Ross MD   dronabinoL (MARINOL) 5 MG capsule TAKE ONE CAPSULE BY MOUTH TWICE DAILY BEFORE MEALS 1/10/22   Parish Ross MD   famotidine (PEPCID) 20 MG tablet Take 20 mg by mouth 2 (two) times daily.    Historical Provider    fluconazole (DIFLUCAN) 150 MG Tab Take 1 tablet (150 mg total) by mouth every 72 hours as needed.  Patient not taking: Reported on 12/27/2021 7/2/21   Phu Obrien MD   gabapentin (NEURONTIN) 300 MG capsule TAKE ONE CAPSULE BY MOUTH ONCE DAILY. AFTER 2 WEEKS, TAKE 1 CAPSULE TWICE DAILY 10/20/21   Parish Ross MD   loperamide (IMODIUM) 2 mg capsule Take 2 mg by mouth daily as needed for Diarrhea.    Historical Provider   metroNIDAZOLE (METROGEL) 0.75 % vaginal gel Place 1 applicator vaginally 2 (two) times daily. 1/13/22   Gilson El MD   mirtazapine (REMERON SOL-TAB) 30 MG disintegrating tablet DISSOLVE 1 TABLET(30 MG) ON THE TONGUE EVERY NIGHT 2/11/21   Kalia Astorga MD   multivitamin (THERAGRAN) per tablet Take 1 tablet by mouth once daily.    Historical Provider   nadoloL (CORGARD) 40 MG tablet Take 1 tablet (40 mg total) by mouth once daily. 12/22/21 12/22/22  Parth Monsalve MD   pantoprazole (PROTONIX) 40 MG tablet Take 1 tablet (40 mg total) by mouth 2 (two) times daily. 1/4/22   Parish Ross MD   potassium citrate (UROCIT-K 15) 15 mEq TbSR Take 2 tablets by mouth 2 (two) times daily.  Patient not taking: Reported on 12/27/2021 5/26/21 5/26/22  Phu Obrien MD   promethazine (PHENERGAN) 25 MG tablet TAKE 1 TABLET BY MOUTH EVERY 8 HOURS FOR NAUSEA 11/23/21   Parish Ross MD   sumatriptan (IMITREX) 50 MG tablet TAKE 1 TABLET BY MOUTH AS NEEDED FOR MIGRAINE. MAY REPEAT ONCE IN 2 HRS. DO NOT EXCEED 2 TABS IN 24 HRS 12/8/21   Kalia Astorga MD   valACYclovir (VALTREX) 500 MG tablet Take 1 tablet (500 mg total) by mouth once daily. 3/22/21 9/24/21  Gilson El MD   vedolizumab (ENTYVIO) 300 mg SolR injection Inject 300 mg into the vein.    Historical Provider   zolpidem (AMBIEN) 10 mg Tab TAKE 1 TABLET BY MOUTH EVERY NIGHT AT BEDTIME 12/8/21   Kalia Astorga MD       Review of Systems:  Review of Systems   Constitutional: Negative for chills and fever.   HENT:  Negative for sore throat and trouble swallowing.    Eyes: Negative for visual disturbance.   Respiratory: Negative for cough and shortness of breath.    Cardiovascular: Negative for chest pain.   Gastrointestinal: Negative for abdominal pain, constipation, diarrhea, nausea and vomiting.   Genitourinary: Negative for dysuria and flank pain.   Musculoskeletal: Negative for back pain, neck pain and neck stiffness.   Skin: Negative for rash.        Right gluteal mass   Neurological: Negative for dizziness, syncope, weakness and headaches.   Psychiatric/Behavioral: Negative for confusion.       Health Maintainence:   Immunizations:  Health Maintenance       Date Due Completion Date    DEXA SCAN 06/24/2020 2/18/2020    TETANUS VACCINE 12/29/2029 12/29/2019           PHYSICAL EXAM     LMP 03/30/2015     Physical Exam  Vitals and nursing note reviewed.   Constitutional:       Appearance: Normal appearance.      Comments: Healthy appearing female in NAD or apparent pain. She makes good eye contact, speaks in clear full sentences and ambulates with ease.      HENT:      Head: Normocephalic and atraumatic.      Nose: Nose normal.      Mouth/Throat:      Pharynx: Oropharynx is clear.   Eyes:      Conjunctiva/sclera: Conjunctivae normal.   Cardiovascular:      Rate and Rhythm: Normal rate and regular rhythm.      Pulses: Normal pulses.   Pulmonary:      Effort: No respiratory distress.   Abdominal:      Tenderness: There is no abdominal tenderness.   Musculoskeletal:         General: Normal range of motion.      Cervical back: No rigidity.   Skin:     General: Skin is warm and dry.      Capillary Refill: Capillary refill takes less than 2 seconds.      Findings: No rash.      Comments: Palpable nodularity to the right gluteal region that is firm, mobile, non-tender and non fluctuant. No overlying skin changes    Neurological:      General: No focal deficit present.      Mental Status: She is alert.      Gait: Gait normal.    Psychiatric:         Mood and Affect: Mood normal.         LABS     Lab Results   Component Value Date    HGBA1C 4.8 09/18/2018     CMP  Sodium   Date Value Ref Range Status   01/05/2022 135 (L) 136 - 145 mmol/L Final     Potassium   Date Value Ref Range Status   01/05/2022 4.2 3.5 - 5.1 mmol/L Final     Chloride   Date Value Ref Range Status   01/05/2022 104 95 - 110 mmol/L Final     CO2   Date Value Ref Range Status   01/05/2022 22 (L) 23 - 29 mmol/L Final     Glucose   Date Value Ref Range Status   01/05/2022 99 70 - 110 mg/dL Final     BUN   Date Value Ref Range Status   01/05/2022 7 6 - 20 mg/dL Final     Creatinine   Date Value Ref Range Status   01/05/2022 0.8 0.5 - 1.4 mg/dL Final     Calcium   Date Value Ref Range Status   01/05/2022 9.3 8.7 - 10.5 mg/dL Final     Total Protein   Date Value Ref Range Status   01/05/2022 7.4 6.0 - 8.4 g/dL Final     Albumin   Date Value Ref Range Status   01/05/2022 3.7 3.5 - 5.2 g/dL Final     Total Bilirubin   Date Value Ref Range Status   01/05/2022 0.6 0.1 - 1.0 mg/dL Final     Comment:     For infants and newborns, interpretation of results should be based  on gestational age, weight and in agreement with clinical  observations.    Premature Infant recommended reference ranges:  Up to 24 hours.............<8.0 mg/dL  Up to 48 hours............<12.0 mg/dL  3-5 days..................<15.0 mg/dL  6-29 days.................<15.0 mg/dL       Alkaline Phosphatase   Date Value Ref Range Status   01/05/2022 89 55 - 135 U/L Final     AST   Date Value Ref Range Status   01/05/2022 23 10 - 40 U/L Final     ALT   Date Value Ref Range Status   01/05/2022 13 10 - 44 U/L Final     Anion Gap   Date Value Ref Range Status   01/05/2022 9 8 - 16 mmol/L Final     eGFR if    Date Value Ref Range Status   01/05/2022 >60.0 >60 mL/min/1.73 m^2 Final     eGFR if non    Date Value Ref Range Status   01/05/2022 >60.0 >60 mL/min/1.73 m^2 Final     Comment:      Calculation used to obtain the estimated glomerular filtration  rate (eGFR) is the CKD-EPI equation.        Lab Results   Component Value Date    WBC 6.65 01/05/2022    HGB 13.9 01/05/2022    HCT 42.4 01/05/2022    MCV 93 01/05/2022     01/05/2022     Lab Results   Component Value Date    CHOL 214 (H) 03/24/2021    CHOL 163 01/28/2020    CHOL 225 (H) 10/04/2016     Lab Results   Component Value Date    HDL 48 03/24/2021    HDL 40 01/28/2020    HDL 62 10/04/2016     Lab Results   Component Value Date    LDLCALC 123.8 03/24/2021    LDLCALC 96.6 01/28/2020    LDLCALC 133.2 10/04/2016     Lab Results   Component Value Date    TRIG 211 (H) 03/24/2021    TRIG 132 01/28/2020    TRIG 149 10/04/2016     Lab Results   Component Value Date    CHOLHDL 22.4 03/24/2021    CHOLHDL 24.5 01/28/2020    CHOLHDL 27.6 10/04/2016     Lab Results   Component Value Date    TSH 2.294 09/17/2020       ASSESSMENT/PLAN     Ivy Salgado is a 41 y.o. female     Ivy was seen today for soft tissue mass of the right gluteal region. Low suspicion for bony involvement or soft tissue infection. Likely sequelae of remote IM injection.     Diagnoses and all orders for this visit:    Hip mass, right  -     US Soft Tissue, Lower Extremity, Right; Future      Follow up with PCP as needed    Patient was counseled on when and how to seek emergent care.       Patti Pelletier PA-C   Department of Internal Medicine - [facility]   7:26 AM       UPDATE:  Narrative & Impression  EXAMINATION:  US SOFT TISSUE, LOWER EXTREMITY, RIGHT     CLINICAL HISTORY:  right gluteal mass;  Localized swelling, mass and lump, right lower limb     TECHNIQUE:  Ultrasound of the area of concern, right gluteal region     COMPARISON:  CT September 2020     FINDINGS:  The palpable finding correlates to an echogenic structure with shadowing.  This measures approximately 1.5 x 1 by 2.1 cm.     Impression:     Correlating with the finding on CT, the palpable finding  correlates to a soft tissue calcification.  This is most commonly seen in the setting of prior trauma/injection.        Electronically signed by: Sangeetha Bey MD  Date:                                            01/28/2022   Time:                                           08:25

## 2022-01-28 ENCOUNTER — HOSPITAL ENCOUNTER (OUTPATIENT)
Dept: RADIOLOGY | Facility: HOSPITAL | Age: 42
Discharge: HOME | End: 2022-01-28
Attending: PHYSICIAN ASSISTANT
Payer: MEDICARE

## 2022-01-28 ENCOUNTER — CLINICAL SUPPORT (OUTPATIENT)
Dept: OBSTETRICS AND GYNECOLOGY | Facility: CLINIC | Age: 42
End: 2022-01-28
Payer: MEDICARE

## 2022-01-28 ENCOUNTER — PATIENT MESSAGE (OUTPATIENT)
Dept: DERMATOLOGY | Facility: CLINIC | Age: 42
End: 2022-01-28
Payer: MEDICARE

## 2022-01-28 DIAGNOSIS — R22.41 HIP MASS, RIGHT: ICD-10-CM

## 2022-01-28 PROCEDURE — 76882 US SOFT TISSUE, LOWER EXTREMITY, RIGHT: ICD-10-PCS | Mod: 26,RT,, | Performed by: INTERNAL MEDICINE

## 2022-01-28 PROCEDURE — 76882 US LMTD JT/FCL EVL NVASC XTR: CPT | Mod: 26,RT,, | Performed by: INTERNAL MEDICINE

## 2022-01-28 PROCEDURE — 76882 US LMTD JT/FCL EVL NVASC XTR: CPT | Mod: TC,RT

## 2022-01-28 PROCEDURE — 96372 THER/PROPH/DIAG INJ SC/IM: CPT | Mod: PBBFAC,PN

## 2022-01-28 RX ADMIN — ESTRADIOL VALERATE 20 MG: 20 INJECTION INTRAMUSCULAR at 09:01

## 2022-01-28 NOTE — PROGRESS NOTES
Delestrogen given to left upper outer quad gluteus, patient tolerated well, next injection in 30 days

## 2022-02-01 ENCOUNTER — PATIENT MESSAGE (OUTPATIENT)
Dept: INTERNAL MEDICINE | Facility: CLINIC | Age: 42
End: 2022-02-01
Payer: MEDICARE

## 2022-02-03 ENCOUNTER — PROCEDURE VISIT (OUTPATIENT)
Dept: DERMATOLOGY | Facility: CLINIC | Age: 42
End: 2022-02-03
Payer: MEDICARE

## 2022-02-03 DIAGNOSIS — Z41.1 ELECTIVE PROCEDURE FOR UNACCEPTABLE COSMETIC APPEARANCE: Primary | ICD-10-CM

## 2022-02-03 PROCEDURE — 11954 SUBQ NJX FIL MATRL>10.0 CC: CPT | Mod: CSM,S$GLB,, | Performed by: DERMATOLOGY

## 2022-02-03 PROCEDURE — 99499 NO LOS: ICD-10-PCS | Mod: CSM,S$GLB,, | Performed by: DERMATOLOGY

## 2022-02-03 PROCEDURE — 99499 UNLISTED E&M SERVICE: CPT | Mod: CSM,S$GLB,, | Performed by: DERMATOLOGY

## 2022-02-03 PROCEDURE — 11954 PR JUVEDERM ULTRA: ICD-10-PCS | Mod: CSM,S$GLB,, | Performed by: DERMATOLOGY

## 2022-02-03 RX ORDER — PREDNISONE 20 MG/1
TABLET ORAL
Qty: 5 TABLET | Refills: 0 | Status: SHIPPED | OUTPATIENT
Start: 2022-02-03 | End: 2022-03-30

## 2022-02-03 NOTE — PROGRESS NOTES
Pt presents today for cosmetic treatment of wrinkles and folds on the face with hyaluronic .    Discussed risks, benefits, and side effects of hyaluronic acid injections, including bruising, bleeding, redness, swelling, allergic reaction, pain, tenderness at injection site, infection, immune rejection of filler. Verbal and written consent were obtained from the patient. A total of 1 mL of Juvederm Ultra XC was injected into the bilateral upper and lower vermillion lip, oral commissures and vertical perioral rhytides using a combination of a 30-g needle and a 22-g microcannula. There were no complications, and the patient tolerated the procedure well. Post-injection instructions were provided to the patient.    Juvederm Ultra XC  Lot #: [id number]  Exp date: 2022-06-14

## 2022-02-09 ENCOUNTER — PATIENT MESSAGE (OUTPATIENT)
Dept: ELECTROPHYSIOLOGY | Facility: CLINIC | Age: 42
End: 2022-02-09
Payer: MEDICARE

## 2022-02-09 ENCOUNTER — PATIENT MESSAGE (OUTPATIENT)
Dept: INTERNAL MEDICINE | Facility: CLINIC | Age: 42
End: 2022-02-09
Payer: MEDICARE

## 2022-02-09 ENCOUNTER — PATIENT MESSAGE (OUTPATIENT)
Dept: GASTROENTEROLOGY | Facility: CLINIC | Age: 42
End: 2022-02-09
Payer: MEDICARE

## 2022-02-10 ENCOUNTER — TELEPHONE (OUTPATIENT)
Dept: OBSTETRICS AND GYNECOLOGY | Facility: CLINIC | Age: 42
End: 2022-02-10
Payer: MEDICARE

## 2022-02-10 NOTE — TELEPHONE ENCOUNTER
----- Message from Vidya Newell sent at 2/10/2022 12:27 PM CST -----  Type:  RX Refill Request    Who Called:  Mayra from [facility]  Refill or New Rx:  refill   RX Name and Strength:  testosterone gel  Preferred Pharmacy with phone number:    [facility] fax [phone]  Best Call Back Number:  [phone] for a verbal  Additional Information:  pt has called several times requesting and Pharmacy has faxed over request and getting no response. Please advise

## 2022-02-28 ENCOUNTER — PES CALL (OUTPATIENT)
Dept: ADMINISTRATIVE | Facility: CLINIC | Age: 42
End: 2022-02-28
Payer: MEDICARE

## 2022-03-03 ENCOUNTER — PATIENT MESSAGE (OUTPATIENT)
Dept: OBSTETRICS AND GYNECOLOGY | Facility: CLINIC | Age: 42
End: 2022-03-03
Payer: MEDICARE

## 2022-03-04 ENCOUNTER — OFFICE VISIT (OUTPATIENT)
Dept: OBSTETRICS AND GYNECOLOGY | Facility: CLINIC | Age: 42
End: 2022-03-04
Payer: MEDICARE

## 2022-03-04 VITALS
DIASTOLIC BLOOD PRESSURE: 72 MMHG | HEIGHT: 61 IN | BODY MASS INDEX: 20.6 KG/M2 | WEIGHT: 109.13 LBS | SYSTOLIC BLOOD PRESSURE: 118 MMHG

## 2022-03-04 DIAGNOSIS — Z79.890 HORMONE REPLACEMENT THERAPY (HRT): ICD-10-CM

## 2022-03-04 DIAGNOSIS — N95.1 MENOPAUSAL SYMPTOMS: ICD-10-CM

## 2022-03-04 DIAGNOSIS — Z12.31 VISIT FOR SCREENING MAMMOGRAM: Primary | ICD-10-CM

## 2022-03-04 PROCEDURE — 99214 OFFICE O/P EST MOD 30 MIN: CPT | Mod: PBBFAC,PN | Performed by: SPECIALIST

## 2022-03-04 PROCEDURE — 96372 THER/PROPH/DIAG INJ SC/IM: CPT | Mod: PBBFAC,PN

## 2022-03-04 PROCEDURE — 99213 OFFICE O/P EST LOW 20 MIN: CPT | Mod: S$PBB,,, | Performed by: SPECIALIST

## 2022-03-04 PROCEDURE — 99213 PR OFFICE/OUTPT VISIT, EST, LEVL III, 20-29 MIN: ICD-10-PCS | Mod: S$PBB,,, | Performed by: SPECIALIST

## 2022-03-04 PROCEDURE — 99999 PR PBB SHADOW E&M-EST. PATIENT-LVL IV: ICD-10-PCS | Mod: PBBFAC,,, | Performed by: SPECIALIST

## 2022-03-04 PROCEDURE — 99999 PR PBB SHADOW E&M-EST. PATIENT-LVL IV: CPT | Mod: PBBFAC,,, | Performed by: SPECIALIST

## 2022-03-04 RX ADMIN — ESTRADIOL VALERATE 20 MG: 20 INJECTION INTRAMUSCULAR at 11:03

## 2022-03-04 NOTE — PROGRESS NOTES
40 yo WF presents for annual gyn eval and in addition continued HRT management Monthly ERT remains effective and pt continues topical Test as needed  Discussed risks and benefits of ERT and pt desires to continue  In addition, discussed and rec screening mammogram age 42 as well as DEXA for baseline  Past Medical History:   Diagnosis Date    Abnormal Pap smear 2007    Abnormal Pap smear 5/26/2011    Anemia     Anxiety     Arthritis     C. difficile diarrhea     Crohn's disease     Depression 8/5/2017    Encounter for blood transfusion     Genital HSV     History of colposcopy with cervical biopsy 2007 and 7/2011 2007-LYLA I  and 7/2011- LYLA I    Hypertension     Kidney stone     Kidney stone     Melanoma     Recurrent UTI 4/3/2013    S/P ileostomy 7/9/2012    Sterilization 6/23/2012       Past Surgical History:   Procedure Laterality Date    ABDOMINAL SURGERY      APPENDECTOMY      BILATERAL SALPINGO-OOPHORECTOMY (BSO) Bilateral 5/30/2019    Procedure: SALPINGO-OOPHORECTOMY, BILATERAL;  Surgeon: Rupa German MD;  Location: [facility] OR [address] [place] [address];  Service: OB/GYN;  Laterality: Bilateral;    BLADDER SURGERY      partial cystectomy due to fistula    [facility]      COLON SURGERY      COLONOSCOPY      CYSTOSCOPY  9/23/2020    Procedure: CYSTOSCOPY;  Surgeon: Sascha Florentino MD;  Location: [facility] OR [address] [place] [address];  Service: Urology;;    CYSTOSCOPY W/ URETERAL STENT PLACEMENT Left 9/15/2020    Procedure: CYSTOSCOPY, WITH URETERAL STENT INSERTION;  Surgeon: Sascha Florentino MD;  Location: [facility] OR [address] [place] [address];  Service: Urology;  Laterality: Left;    DIAGNOSTIC LAPAROSCOPY N/A 7/9/2020    Procedure: LAPAROSCOPY, DIAGNOSTIC;  Surgeon: Gilson El MD;  Location: [facility] OR;  Service: OB/GYN;  Laterality: N/A;    EXCISION OF MELANOMA  07/17/2019    ILEOSTOMY      LAPAROSCOPIC LYSIS OF ADHESIONS N/A 7/9/2020    Procedure: LYSIS, ADHESIONS, LAPAROSCOPIC;  Surgeon: Gilson El MD;  Location: [facility]  OR;  Service: OB/GYN;  Laterality: N/A;    LASER LITHOTRIPSY  9/23/2020    Procedure: LITHOTRIPSY, USING LASER;  Surgeon: Sascha Florentino MD;  Location: [facility] OR 1ST FLR;  Service: Urology;;    LYSIS OF ADHESIONS N/A 5/30/2019    Procedure: LYSIS, ADHESIONS;  Surgeon: Rupa German MD;  Location: [facility] OR 2ND FLR;  Service: OB/GYN;  Laterality: N/A;    OOPHORECTOMY Right 04/16/2015    PORTACATH PLACEMENT  02/21/2017    SKIN BIOPSY      SMALL INTESTINE SURGERY      age 18 Y    TOTAL ABDOMINAL HYSTERECTOMY  04/16/2015    TOTAL COLECTOMY      TUBAL LIGATION  06/06/2012    UPPER GASTROINTESTINAL ENDOSCOPY      URETEROSCOPIC REMOVAL OF URETERIC CALCULUS  9/23/2020    Procedure: REMOVAL, CALCULUS, URETER, URETEROSCOPIC;  Surgeon: Sascha Florentino MD;  Location: [facility] OR 1ST FLR;  Service: Urology;;    URETEROSCOPY Left 9/23/2020    Procedure: URETEROSCOPY;  Surgeon: Sascha Florentino MD;  Location: [facility] OR 1ST FLR;  Service: Urology;  Laterality: Left;       Family History   Problem Relation Age of Onset    Colon cancer Father     Cancer Father         Colon    Liver cancer Father     Hyperlipidemia Father     Hypertension Mother     Cancer Maternal Grandfather         Skin    Skin cancer Maternal Grandfather     Diabetes Maternal Grandfather     Heart disease Maternal Grandfather     Endometrial cancer Maternal Aunt     Crohn's disease Brother     Diabetes Paternal Grandfather     Hearing loss Paternal Grandmother     Breast cancer Neg Hx     Ovarian cancer Neg Hx     Melanoma Neg Hx        Social History     Socioeconomic History    Marital status: Single   Occupational History     Employer: [employer]   Tobacco Use    Smoking status: Never Smoker    Smokeless tobacco: Never Used   Substance and Sexual Activity    Alcohol use: Not Currently     Alcohol/week: 0.0 standard drinks    Drug use: No    Sexual activity: Yes     Partners: Male     Birth  control/protection: See Surgical Hx     Comment: HYST   Other Topics Concern    Are you pregnant or think you may be? No    Breast-feeding No     Social Determinants of Health     Financial Resource Strain: Medium Risk    Difficulty of Paying Living Expenses: Somewhat hard   Food Insecurity: Food Insecurity Present    Worried About Running Out of Food in the Last Year: Sometimes true    Ran Out of Food in the Last Year: Sometimes true   Transportation Needs: No Transportation Needs    Lack of Transportation (Medical): No    Lack of Transportation (Non-Medical): No   Physical Activity: Insufficiently Active    Days of Exercise per Week: 3 days    Minutes of Exercise per Session: 30 min   Stress: Stress Concern Present    Feeling of Stress : Rather much   Social Connections: Unknown    Frequency of Communication with Friends and Family: More than three times a week    Frequency of Social Gatherings with Friends and Family: Once a week    Active Member of Clubs or Organizations: No    Attends Club or Organization Meetings: Never    Marital Status: Living with partner   Housing Stability: Low Risk     Unable to Pay for Housing in the Last Year: No    Number of Places Lived in the Last Year: 2    Unstable Housing in the Last Year: No       Current Outpatient Medications   Medication Sig Dispense Refill    aspirin 81 MG Chew Take 81 mg by mouth once daily.      clonazePAM (KLONOPIN) 1 MG tablet TAKE 1 1/2 TABLETS BY MOUTH TWICE DAILY AS NEEDED FOR ANXIETY 75 tablet 0    diphenoxylate-atropine 2.5-0.025 mg (LOMOTIL) 2.5-0.025 mg per tablet Take 1 tablet by mouth 4 (four) times daily as needed for Diarrhea. for diarrhea 100 tablet 0    dronabinoL (MARINOL) 5 MG capsule TAKE 1 CAPSULE BY MOUTH TWICE DAILY BEFORE MEALS 60 capsule 1    famotidine (PEPCID) 20 MG tablet Take 20 mg by mouth 2 (two) times daily.      fluconazole (DIFLUCAN) 150 MG Tab TAKE 1 TABLET(150 MG) BY MOUTH EVERY 72 HOURS AS NEEDED 3  tablet 0    gabapentin (NEURONTIN) 300 MG capsule TAKE ONE CAPSULE BY MOUTH ONCE DAILY. AFTER 2 WEEKS, TAKE 1 CAPSULE TWICE DAILY 60 capsule 11    loperamide (IMODIUM) 2 mg capsule Take 2 mg by mouth daily as needed for Diarrhea.      mirtazapine (REMERON SOL-TAB) 30 MG disintegrating tablet DISSOLVE 1 TABLET(30 MG) ON THE TONGUE EVERY NIGHT 90 tablet 2    multivitamin (THERAGRAN) per tablet Take 1 tablet by mouth once daily.      nadoloL (CORGARD) 40 MG tablet Take 1 tablet (40 mg total) by mouth once daily. 30 tablet 11    pantoprazole (PROTONIX) 40 MG tablet Take 1 tablet (40 mg total) by mouth 2 (two) times daily. 60 tablet 12    predniSONE (DELTASONE) 20 MG tablet Take 1 pill po qam with breakfast x 3-5 days. 5 tablet 0    promethazine (PHENERGAN) 25 MG tablet TAKE 1 TABLET BY MOUTH EVERY 8 HOURS FOR NAUSEA 30 tablet 2    sumatriptan (IMITREX) 50 MG tablet TAKE 1 TABLET BY MOUTH AS NEEDED FOR MIGRAINE. MAY REPEAT ONCE IN 2 HRS. DO NOT EXCEED 2 TABS IN 24 HRS 12 tablet 0    vedolizumab (ENTYVIO) 300 mg SolR injection Inject 300 mg into the vein.      zolpidem (AMBIEN) 10 mg Tab TAKE 1 TABLET BY MOUTH EVERY NIGHT AT BEDTIME 30 tablet 0    metroNIDAZOLE (METROGEL) 0.75 % vaginal gel Place 1 applicator vaginally 2 (two) times daily. (Patient not taking: No sig reported) 70 g 0    potassium citrate (UROCIT-K 15) 15 mEq TbSR Take 2 tablets by mouth 2 (two) times daily. (Patient not taking: No sig reported) 360 tablet 3    valACYclovir (VALTREX) 500 MG tablet Take 1 tablet (500 mg total) by mouth once daily. 90 tablet 3     Current Facility-Administered Medications   Medication Dose Route Frequency Provider Last Rate Last Admin    estradiol valerate (DELESTROGEN) injection 20 mg/mL  20 mg Intramuscular Q30 Days Gilson El MD   20 mg at 01/28/22 0925       Review of patient's allergies indicates:   Allergen Reactions    Azathioprine sodium Other (See Comments)     Other reaction(s):  pancreatitis  Other reaction(s): pancreatitis    Methotrexate analogues Other (See Comments)     leukopenia    Stelara [ustekinumab] Other (See Comments)     Multiple infections    Zofran [ondansetron hcl (pf)] Other (See Comments)     Per patient causes prolong QT    Vancomycin analogues Hives    Morphine Itching and Other (See Comments)     Other reaction(s): Itching    Bactrim [sulfamethoxazole-trimethoprim] Palpitations    Ciprofloxacin Palpitations       Review of System:   General: no chills, fever, night sweats, weight gain or weight loss  Psychological: no depression or suicidal ideation  Breasts: no new or changing breast lumps, nipple discharge or masses.  Respiratory: no cough, shortness of breath, or wheezing  Cardiovascular: no chest pain or dyspnea on exertion  Gastrointestinal: no abdominal pain, change in bowel habits, or black or bloody stools  Genito-Urinary: no incontinence, urinary frequency/urgency or vulvar/vaginal symptoms, pelvic pain or abnormal vaginal bleeding.  Musculoskeletal: no gait disturbance or muscular weakness    PE:   APPEARANCE: Well nourished, well developed, in no acute distress.  NECK: Neck symmetric without masses or thyromegaly.  NODES: No inguinal lymph node enlargement.  ABDOMEN: Soft. No tenderness or masses. No hepatosplenomegaly. No hernias.  BREASTS: Symmetrical, no skin changes or visible lesions. No palpable masses, nipple discharge or adenopathy bilaterally.  PELVIC: Normal external female genitalia without lesions. Normal hair distribution. Adequate perineal body, normal urethral meatus. Vagina moist and well rugated without lesions or discharge. No significant cystocele or rectocele. Uterus and cervix surgically absent. Bimanual exam revealed no masses, tenderness or abnormality.    NOTE  NURSING PERSONAL PRESENT FOR ENTIRE PHYSICAL EXAM      Plan Continue ERT  Topical 2% test twice weekly  BSE monthly  Screening mammogram age 42  RTO 1 year/prn

## 2022-03-05 ENCOUNTER — PATIENT MESSAGE (OUTPATIENT)
Dept: ELECTROPHYSIOLOGY | Facility: CLINIC | Age: 42
End: 2022-03-05
Payer: MEDICARE

## 2022-03-08 ENCOUNTER — PATIENT MESSAGE (OUTPATIENT)
Dept: INTERNAL MEDICINE | Facility: CLINIC | Age: 42
End: 2022-03-08
Payer: MEDICARE

## 2022-03-14 ENCOUNTER — TELEPHONE (OUTPATIENT)
Dept: INTERNAL MEDICINE | Facility: CLINIC | Age: 42
End: 2022-03-14
Payer: MEDICARE

## 2022-03-17 ENCOUNTER — LAB VISIT (OUTPATIENT)
Dept: LAB | Facility: HOSPITAL | Age: 42
End: 2022-03-17
Attending: INTERNAL MEDICINE
Payer: MEDICARE

## 2022-03-17 DIAGNOSIS — K50.019 CROHN'S DISEASE OF SMALL INTESTINE WITH COMPLICATION: ICD-10-CM

## 2022-03-17 DIAGNOSIS — Z13.6 ENCOUNTER FOR LIPID SCREENING FOR CARDIOVASCULAR DISEASE: ICD-10-CM

## 2022-03-17 DIAGNOSIS — Z13.220 ENCOUNTER FOR LIPID SCREENING FOR CARDIOVASCULAR DISEASE: ICD-10-CM

## 2022-03-17 DIAGNOSIS — E04.2 MULTIPLE THYROID NODULES: ICD-10-CM

## 2022-03-17 LAB
ALBUMIN SERPL BCP-MCNC: 3.5 G/DL (ref 3.5–5.2)
ALP SERPL-CCNC: 84 U/L (ref 55–135)
ALT SERPL W/O P-5'-P-CCNC: 17 U/L (ref 10–44)
ANION GAP SERPL CALC-SCNC: 8 MMOL/L (ref 8–16)
AST SERPL-CCNC: 27 U/L (ref 10–40)
BASOPHILS # BLD AUTO: 0.03 K/UL (ref 0–0.2)
BASOPHILS NFR BLD: 0.5 % (ref 0–1.9)
BILIRUB SERPL-MCNC: 0.3 MG/DL (ref 0.1–1)
BUN SERPL-MCNC: 8 MG/DL (ref 6–20)
CALCIUM SERPL-MCNC: 9.4 MG/DL (ref 8.7–10.5)
CHLORIDE SERPL-SCNC: 104 MMOL/L (ref 95–110)
CHOLEST SERPL-MCNC: 211 MG/DL (ref 120–199)
CHOLEST/HDLC SERPL: 3.6 {RATIO} (ref 2–5)
CO2 SERPL-SCNC: 26 MMOL/L (ref 23–29)
CREAT SERPL-MCNC: 0.7 MG/DL (ref 0.5–1.4)
DIFFERENTIAL METHOD: NORMAL
EOSINOPHIL # BLD AUTO: 0.1 K/UL (ref 0–0.5)
EOSINOPHIL NFR BLD: 1.4 % (ref 0–8)
ERYTHROCYTE [DISTWIDTH] IN BLOOD BY AUTOMATED COUNT: 13.2 % (ref 11.5–14.5)
EST. GFR  (AFRICAN AMERICAN): >60 ML/MIN/1.73 M^2
EST. GFR  (NON AFRICAN AMERICAN): >60 ML/MIN/1.73 M^2
GLUCOSE SERPL-MCNC: 91 MG/DL (ref 70–110)
HCT VFR BLD AUTO: 39.3 % (ref 37–48.5)
HDLC SERPL-MCNC: 58 MG/DL (ref 40–75)
HDLC SERPL: 27.5 % (ref 20–50)
HGB BLD-MCNC: 13.1 G/DL (ref 12–16)
IMM GRANULOCYTES # BLD AUTO: 0.01 K/UL (ref 0–0.04)
IMM GRANULOCYTES NFR BLD AUTO: 0.2 % (ref 0–0.5)
LDLC SERPL CALC-MCNC: 102 MG/DL (ref 63–159)
LYMPHOCYTES # BLD AUTO: 2.4 K/UL (ref 1–4.8)
LYMPHOCYTES NFR BLD: 36.7 % (ref 18–48)
MCH RBC QN AUTO: 30.8 PG (ref 27–31)
MCHC RBC AUTO-ENTMCNC: 33.3 G/DL (ref 32–36)
MCV RBC AUTO: 92 FL (ref 82–98)
MONOCYTES # BLD AUTO: 0.8 K/UL (ref 0.3–1)
MONOCYTES NFR BLD: 11.9 % (ref 4–15)
NEUTROPHILS # BLD AUTO: 3.2 K/UL (ref 1.8–7.7)
NEUTROPHILS NFR BLD: 49.3 % (ref 38–73)
NONHDLC SERPL-MCNC: 153 MG/DL
NRBC BLD-RTO: 0 /100 WBC
PLATELET # BLD AUTO: 233 K/UL (ref 150–450)
PMV BLD AUTO: 10.9 FL (ref 9.2–12.9)
POTASSIUM SERPL-SCNC: 4.3 MMOL/L (ref 3.5–5.1)
PROT SERPL-MCNC: 7.3 G/DL (ref 6–8.4)
RBC # BLD AUTO: 4.26 M/UL (ref 4–5.4)
SODIUM SERPL-SCNC: 138 MMOL/L (ref 136–145)
TRIGL SERPL-MCNC: 255 MG/DL (ref 30–150)
TSH SERPL DL<=0.005 MIU/L-ACNC: 1.43 UIU/ML (ref 0.4–4)
WBC # BLD AUTO: 6.41 K/UL (ref 3.9–12.7)

## 2022-03-17 PROCEDURE — 80061 LIPID PANEL: CPT | Performed by: INTERNAL MEDICINE

## 2022-03-17 PROCEDURE — 36415 COLL VENOUS BLD VENIPUNCTURE: CPT | Performed by: INTERNAL MEDICINE

## 2022-03-17 PROCEDURE — 80053 COMPREHEN METABOLIC PANEL: CPT | Performed by: INTERNAL MEDICINE

## 2022-03-17 PROCEDURE — 85025 COMPLETE CBC W/AUTO DIFF WBC: CPT | Performed by: INTERNAL MEDICINE

## 2022-03-17 PROCEDURE — 84443 ASSAY THYROID STIM HORMONE: CPT | Performed by: INTERNAL MEDICINE

## 2022-03-21 ENCOUNTER — TELEPHONE (OUTPATIENT)
Dept: INTERNAL MEDICINE | Facility: CLINIC | Age: 42
End: 2022-03-21
Payer: MEDICARE

## 2022-03-21 ENCOUNTER — PATIENT MESSAGE (OUTPATIENT)
Dept: INTERNAL MEDICINE | Facility: CLINIC | Age: 42
End: 2022-03-21
Payer: MEDICARE

## 2022-03-21 DIAGNOSIS — R50.9 FEVER, UNSPECIFIED FEVER CAUSE: ICD-10-CM

## 2022-03-21 DIAGNOSIS — R10.9 FLANK PAIN: Primary | ICD-10-CM

## 2022-03-21 NOTE — TELEPHONE ENCOUNTER
----- Message from Nighat Metzger sent at 3/21/2022 11:02 AM CDT -----  Regarding: Urine  Hello,     The patient is here in lab for an urine specimen orders but there are no orders in the system.     My ext is [phone] and the patient would also like a call when the orders are place in the system.

## 2022-03-22 ENCOUNTER — LAB VISIT (OUTPATIENT)
Dept: LAB | Facility: HOSPITAL | Age: 42
End: 2022-03-22
Attending: INTERNAL MEDICINE
Payer: MEDICARE

## 2022-03-22 DIAGNOSIS — R10.9 FLANK PAIN: ICD-10-CM

## 2022-03-22 DIAGNOSIS — R50.9 FEVER, UNSPECIFIED FEVER CAUSE: ICD-10-CM

## 2022-03-22 LAB
BACTERIA #/AREA URNS AUTO: ABNORMAL /HPF
BILIRUB UR QL STRIP: NEGATIVE
CAOX CRY UR QL COMP ASSIST: ABNORMAL
CLARITY UR REFRACT.AUTO: ABNORMAL
COLOR UR AUTO: YELLOW
GLUCOSE UR QL STRIP: NEGATIVE
HGB UR QL STRIP: ABNORMAL
KETONES UR QL STRIP: NEGATIVE
LEUKOCYTE ESTERASE UR QL STRIP: ABNORMAL
MICROSCOPIC COMMENT: ABNORMAL
NITRITE UR QL STRIP: NEGATIVE
PH UR STRIP: 5 [PH] (ref 5–8)
PROT UR QL STRIP: NEGATIVE
RBC #/AREA URNS AUTO: 10 /HPF (ref 0–4)
SP GR UR STRIP: 1.02 (ref 1–1.03)
SQUAMOUS #/AREA URNS AUTO: 7 /HPF
URN SPEC COLLECT METH UR: ABNORMAL
WBC #/AREA URNS AUTO: 95 /HPF (ref 0–5)
YEAST UR QL AUTO: ABNORMAL

## 2022-03-22 PROCEDURE — 87088 URINE BACTERIA CULTURE: CPT | Performed by: INTERNAL MEDICINE

## 2022-03-22 PROCEDURE — 87186 SC STD MICRODIL/AGAR DIL: CPT | Performed by: INTERNAL MEDICINE

## 2022-03-22 PROCEDURE — 87077 CULTURE AEROBIC IDENTIFY: CPT | Performed by: INTERNAL MEDICINE

## 2022-03-22 PROCEDURE — 81001 URINALYSIS AUTO W/SCOPE: CPT | Performed by: INTERNAL MEDICINE

## 2022-03-22 PROCEDURE — 87086 URINE CULTURE/COLONY COUNT: CPT | Performed by: INTERNAL MEDICINE

## 2022-03-23 ENCOUNTER — PATIENT MESSAGE (OUTPATIENT)
Dept: INTERNAL MEDICINE | Facility: CLINIC | Age: 42
End: 2022-03-23
Payer: MEDICARE

## 2022-03-23 ENCOUNTER — HOSPITAL ENCOUNTER (OUTPATIENT)
Facility: HOSPITAL | Age: 42
Discharge: HOME | End: 2022-03-24
Attending: EMERGENCY MEDICINE | Admitting: EMERGENCY MEDICINE
Payer: MEDICARE

## 2022-03-23 DIAGNOSIS — K50.019 CROHN'S DISEASE OF SMALL INTESTINE WITH COMPLICATION: ICD-10-CM

## 2022-03-23 DIAGNOSIS — R07.9 CHEST PAIN: ICD-10-CM

## 2022-03-23 DIAGNOSIS — G40.219 COMPLEX PARTIAL EPILEPSY WITH GENERALIZATION AND WITH INTRACTABLE EPILEPSY: ICD-10-CM

## 2022-03-23 DIAGNOSIS — N12 PYELONEPHRITIS: Primary | ICD-10-CM

## 2022-03-23 DIAGNOSIS — F41.1 GENERALIZED ANXIETY DISORDER: ICD-10-CM

## 2022-03-23 LAB
ALBUMIN SERPL BCP-MCNC: 3.7 G/DL (ref 3.5–5.2)
ALP SERPL-CCNC: 74 U/L (ref 55–135)
ALT SERPL W/O P-5'-P-CCNC: 8 U/L (ref 10–44)
ANION GAP SERPL CALC-SCNC: 8 MMOL/L (ref 8–16)
AST SERPL-CCNC: 13 U/L (ref 10–40)
B-HCG UR QL: NEGATIVE
BACTERIA #/AREA URNS AUTO: ABNORMAL /HPF
BASOPHILS # BLD AUTO: 0.03 K/UL (ref 0–0.2)
BASOPHILS NFR BLD: 0.3 % (ref 0–1.9)
BILIRUB SERPL-MCNC: 0.6 MG/DL (ref 0.1–1)
BILIRUB UR QL STRIP: NEGATIVE
BUN SERPL-MCNC: 10 MG/DL (ref 6–20)
CALCIUM SERPL-MCNC: 9.7 MG/DL (ref 8.7–10.5)
CHLORIDE SERPL-SCNC: 107 MMOL/L (ref 95–110)
CLARITY UR REFRACT.AUTO: ABNORMAL
CO2 SERPL-SCNC: 23 MMOL/L (ref 23–29)
COLOR UR AUTO: YELLOW
CREAT SERPL-MCNC: 0.8 MG/DL (ref 0.5–1.4)
CTP QC/QA: YES
DIFFERENTIAL METHOD: ABNORMAL
EOSINOPHIL # BLD AUTO: 0 K/UL (ref 0–0.5)
EOSINOPHIL NFR BLD: 0.2 % (ref 0–8)
ERYTHROCYTE [DISTWIDTH] IN BLOOD BY AUTOMATED COUNT: 13.4 % (ref 11.5–14.5)
EST. GFR  (AFRICAN AMERICAN): >60 ML/MIN/1.73 M^2
EST. GFR  (NON AFRICAN AMERICAN): >60 ML/MIN/1.73 M^2
GLUCOSE SERPL-MCNC: 98 MG/DL (ref 70–110)
GLUCOSE UR QL STRIP: NEGATIVE
HCT VFR BLD AUTO: 40 % (ref 37–48.5)
HGB BLD-MCNC: 13.6 G/DL (ref 12–16)
HGB UR QL STRIP: ABNORMAL
HYALINE CASTS UR QL AUTO: 4 /LPF
IMM GRANULOCYTES # BLD AUTO: 0.02 K/UL (ref 0–0.04)
IMM GRANULOCYTES NFR BLD AUTO: 0.2 % (ref 0–0.5)
KETONES UR QL STRIP: NEGATIVE
LACTATE SERPL-SCNC: 1.2 MMOL/L (ref 0.5–2.2)
LEUKOCYTE ESTERASE UR QL STRIP: ABNORMAL
LIPASE SERPL-CCNC: 32 U/L (ref 4–60)
LYMPHOCYTES # BLD AUTO: 2.1 K/UL (ref 1–4.8)
LYMPHOCYTES NFR BLD: 23.4 % (ref 18–48)
MCH RBC QN AUTO: 31.1 PG (ref 27–31)
MCHC RBC AUTO-ENTMCNC: 34 G/DL (ref 32–36)
MCV RBC AUTO: 91 FL (ref 82–98)
MICROSCOPIC COMMENT: ABNORMAL
MONOCYTES # BLD AUTO: 0.9 K/UL (ref 0.3–1)
MONOCYTES NFR BLD: 10.3 % (ref 4–15)
NEUTROPHILS # BLD AUTO: 5.8 K/UL (ref 1.8–7.7)
NEUTROPHILS NFR BLD: 65.6 % (ref 38–73)
NITRITE UR QL STRIP: POSITIVE
NRBC BLD-RTO: 0 /100 WBC
PH UR STRIP: 6 [PH] (ref 5–8)
PLATELET # BLD AUTO: 316 K/UL (ref 150–450)
PMV BLD AUTO: 10.3 FL (ref 9.2–12.9)
POTASSIUM SERPL-SCNC: 3.8 MMOL/L (ref 3.5–5.1)
PROT SERPL-MCNC: 7.8 G/DL (ref 6–8.4)
PROT UR QL STRIP: NEGATIVE
RBC # BLD AUTO: 4.38 M/UL (ref 4–5.4)
RBC #/AREA URNS AUTO: 59 /HPF (ref 0–4)
SODIUM SERPL-SCNC: 138 MMOL/L (ref 136–145)
SP GR UR STRIP: 1.02 (ref 1–1.03)
SQUAMOUS #/AREA URNS AUTO: 5 /HPF
URN SPEC COLLECT METH UR: ABNORMAL
WBC # BLD AUTO: 8.92 K/UL (ref 3.9–12.7)
WBC #/AREA URNS AUTO: >100 /HPF (ref 0–5)
WBC CLUMPS UR QL AUTO: ABNORMAL

## 2022-03-23 PROCEDURE — 87040 BLOOD CULTURE FOR BACTERIA: CPT | Mod: 59 | Performed by: PHYSICIAN ASSISTANT

## 2022-03-23 PROCEDURE — 87186 SC STD MICRODIL/AGAR DIL: CPT | Performed by: PHYSICIAN ASSISTANT

## 2022-03-23 PROCEDURE — 83690 ASSAY OF LIPASE: CPT | Performed by: PHYSICIAN ASSISTANT

## 2022-03-23 PROCEDURE — 81025 URINE PREGNANCY TEST: CPT | Performed by: PHYSICIAN ASSISTANT

## 2022-03-23 PROCEDURE — 80053 COMPREHEN METABOLIC PANEL: CPT | Performed by: PHYSICIAN ASSISTANT

## 2022-03-23 PROCEDURE — 87086 URINE CULTURE/COLONY COUNT: CPT | Performed by: PHYSICIAN ASSISTANT

## 2022-03-23 PROCEDURE — 99285 EMERGENCY DEPT VISIT HI MDM: CPT | Mod: 25

## 2022-03-23 PROCEDURE — 96375 TX/PRO/DX INJ NEW DRUG ADDON: CPT

## 2022-03-23 PROCEDURE — 25000003 PHARM REV CODE 250

## 2022-03-23 PROCEDURE — 63600175 PHARM REV CODE 636 W HCPCS

## 2022-03-23 PROCEDURE — 99285 PR EMERGENCY DEPT VISIT,LEVEL V: ICD-10-PCS | Mod: ,,, | Performed by: PHYSICIAN ASSISTANT

## 2022-03-23 PROCEDURE — G0378 HOSPITAL OBSERVATION PER HR: HCPCS

## 2022-03-23 PROCEDURE — 99285 EMERGENCY DEPT VISIT HI MDM: CPT | Mod: ,,, | Performed by: PHYSICIAN ASSISTANT

## 2022-03-23 PROCEDURE — 86803 HEPATITIS C AB TEST: CPT | Performed by: EMERGENCY MEDICINE

## 2022-03-23 PROCEDURE — 63600175 PHARM REV CODE 636 W HCPCS: Performed by: PHYSICIAN ASSISTANT

## 2022-03-23 PROCEDURE — 96361 HYDRATE IV INFUSION ADD-ON: CPT

## 2022-03-23 PROCEDURE — 87077 CULTURE AEROBIC IDENTIFY: CPT | Performed by: PHYSICIAN ASSISTANT

## 2022-03-23 PROCEDURE — 99220 PR INITIAL OBSERVATION CARE,LEVL III: CPT | Mod: ,,,

## 2022-03-23 PROCEDURE — 83605 ASSAY OF LACTIC ACID: CPT | Performed by: PHYSICIAN ASSISTANT

## 2022-03-23 PROCEDURE — 96376 TX/PRO/DX INJ SAME DRUG ADON: CPT

## 2022-03-23 PROCEDURE — 87389 HIV-1 AG W/HIV-1&-2 AB AG IA: CPT | Performed by: EMERGENCY MEDICINE

## 2022-03-23 PROCEDURE — 81001 URINALYSIS AUTO W/SCOPE: CPT | Performed by: PHYSICIAN ASSISTANT

## 2022-03-23 PROCEDURE — 87088 URINE BACTERIA CULTURE: CPT | Performed by: PHYSICIAN ASSISTANT

## 2022-03-23 PROCEDURE — 85025 COMPLETE CBC W/AUTO DIFF WBC: CPT | Performed by: PHYSICIAN ASSISTANT

## 2022-03-23 PROCEDURE — 96365 THER/PROPH/DIAG IV INF INIT: CPT

## 2022-03-23 PROCEDURE — 99220 PR INITIAL OBSERVATION CARE,LEVL III: ICD-10-PCS | Mod: ,,,

## 2022-03-23 RX ORDER — IPRATROPIUM BROMIDE AND ALBUTEROL SULFATE 2.5; .5 MG/3ML; MG/3ML
3 SOLUTION RESPIRATORY (INHALATION) EVERY 4 HOURS PRN
Status: DISCONTINUED | OUTPATIENT
Start: 2022-03-23 | End: 2022-03-24 | Stop reason: HOSPADM

## 2022-03-23 RX ORDER — NADOLOL 20 MG/1
40 TABLET ORAL DAILY
Status: DISCONTINUED | OUTPATIENT
Start: 2022-03-23 | End: 2022-03-24 | Stop reason: HOSPADM

## 2022-03-23 RX ORDER — NALOXONE HCL 0.4 MG/ML
0.02 VIAL (ML) INJECTION
Status: DISCONTINUED | OUTPATIENT
Start: 2022-03-23 | End: 2022-03-24 | Stop reason: HOSPADM

## 2022-03-23 RX ORDER — BISACODYL 10 MG
10 SUPPOSITORY, RECTAL RECTAL DAILY PRN
Status: DISCONTINUED | OUTPATIENT
Start: 2022-03-23 | End: 2022-03-24 | Stop reason: HOSPADM

## 2022-03-23 RX ORDER — TALC
6 POWDER (GRAM) TOPICAL NIGHTLY PRN
Status: DISCONTINUED | OUTPATIENT
Start: 2022-03-23 | End: 2022-03-24 | Stop reason: HOSPADM

## 2022-03-23 RX ORDER — LOPERAMIDE HYDROCHLORIDE 2 MG/1
2 CAPSULE ORAL DAILY PRN
Status: DISCONTINUED | OUTPATIENT
Start: 2022-03-23 | End: 2022-03-24 | Stop reason: HOSPADM

## 2022-03-23 RX ORDER — PANTOPRAZOLE SODIUM 40 MG/1
40 TABLET, DELAYED RELEASE ORAL 2 TIMES DAILY
Status: DISCONTINUED | OUTPATIENT
Start: 2022-03-23 | End: 2022-03-24 | Stop reason: HOSPADM

## 2022-03-23 RX ORDER — PROCHLORPERAZINE EDISYLATE 5 MG/ML
5 INJECTION INTRAMUSCULAR; INTRAVENOUS EVERY 6 HOURS PRN
Status: DISCONTINUED | OUTPATIENT
Start: 2022-03-23 | End: 2022-03-24 | Stop reason: HOSPADM

## 2022-03-23 RX ORDER — MAG HYDROX/ALUMINUM HYD/SIMETH 200-200-20
30 SUSPENSION, ORAL (FINAL DOSE FORM) ORAL 4 TIMES DAILY PRN
Status: DISCONTINUED | OUTPATIENT
Start: 2022-03-23 | End: 2022-03-24 | Stop reason: HOSPADM

## 2022-03-23 RX ORDER — OXYCODONE HYDROCHLORIDE 10 MG/1
10 TABLET ORAL EVERY 6 HOURS PRN
Status: DISCONTINUED | OUTPATIENT
Start: 2022-03-23 | End: 2022-03-24 | Stop reason: HOSPADM

## 2022-03-23 RX ORDER — POTASSIUM CITRATE 15 MEQ/1
2 TABLET, EXTENDED RELEASE ORAL 2 TIMES DAILY
Status: DISCONTINUED | OUTPATIENT
Start: 2022-03-23 | End: 2022-03-23

## 2022-03-23 RX ORDER — ACETAMINOPHEN 325 MG/1
650 TABLET ORAL EVERY 4 HOURS PRN
Status: DISCONTINUED | OUTPATIENT
Start: 2022-03-23 | End: 2022-03-24 | Stop reason: HOSPADM

## 2022-03-23 RX ORDER — HYDROMORPHONE HYDROCHLORIDE 1 MG/ML
0.5 INJECTION, SOLUTION INTRAMUSCULAR; INTRAVENOUS; SUBCUTANEOUS
Status: DISCONTINUED | OUTPATIENT
Start: 2022-03-23 | End: 2022-03-23

## 2022-03-23 RX ORDER — IBUPROFEN 200 MG
16 TABLET ORAL
Status: DISCONTINUED | OUTPATIENT
Start: 2022-03-23 | End: 2022-03-24 | Stop reason: HOSPADM

## 2022-03-23 RX ORDER — ACETAMINOPHEN 500 MG
1000 TABLET ORAL EVERY 8 HOURS PRN
Status: DISCONTINUED | OUTPATIENT
Start: 2022-03-23 | End: 2022-03-24 | Stop reason: HOSPADM

## 2022-03-23 RX ORDER — ZOLPIDEM TARTRATE 5 MG/1
10 TABLET ORAL NIGHTLY
Status: DISCONTINUED | OUTPATIENT
Start: 2022-03-23 | End: 2022-03-24 | Stop reason: HOSPADM

## 2022-03-23 RX ORDER — POTASSIUM CHLORIDE 750 MG/1
10 CAPSULE, EXTENDED RELEASE ORAL 2 TIMES DAILY
Status: DISCONTINUED | OUTPATIENT
Start: 2022-03-23 | End: 2022-03-24 | Stop reason: HOSPADM

## 2022-03-23 RX ORDER — MIRTAZAPINE 15 MG/1
30 TABLET, ORALLY DISINTEGRATING ORAL NIGHTLY
Status: DISCONTINUED | OUTPATIENT
Start: 2022-03-23 | End: 2022-03-24 | Stop reason: HOSPADM

## 2022-03-23 RX ORDER — CLONAZEPAM 0.5 MG/1
1 TABLET ORAL 2 TIMES DAILY PRN
Status: DISCONTINUED | OUTPATIENT
Start: 2022-03-23 | End: 2022-03-24 | Stop reason: HOSPADM

## 2022-03-23 RX ORDER — HYDROMORPHONE HYDROCHLORIDE 1 MG/ML
1 INJECTION, SOLUTION INTRAMUSCULAR; INTRAVENOUS; SUBCUTANEOUS
Status: COMPLETED | OUTPATIENT
Start: 2022-03-23 | End: 2022-03-23

## 2022-03-23 RX ORDER — KETOROLAC TROMETHAMINE 30 MG/ML
10 INJECTION, SOLUTION INTRAMUSCULAR; INTRAVENOUS
Status: COMPLETED | OUTPATIENT
Start: 2022-03-23 | End: 2022-03-23

## 2022-03-23 RX ORDER — DRONABINOL 2.5 MG/1
5 CAPSULE ORAL
Status: DISCONTINUED | OUTPATIENT
Start: 2022-03-23 | End: 2022-03-24 | Stop reason: HOSPADM

## 2022-03-23 RX ORDER — OXYCODONE HYDROCHLORIDE 5 MG/1
5 TABLET ORAL EVERY 6 HOURS PRN
Status: DISCONTINUED | OUTPATIENT
Start: 2022-03-23 | End: 2022-03-24 | Stop reason: HOSPADM

## 2022-03-23 RX ORDER — GLUCAGON 1 MG
1 KIT INJECTION
Status: DISCONTINUED | OUTPATIENT
Start: 2022-03-23 | End: 2022-03-24 | Stop reason: HOSPADM

## 2022-03-23 RX ORDER — GABAPENTIN 300 MG/1
300 CAPSULE ORAL 2 TIMES DAILY
Status: DISCONTINUED | OUTPATIENT
Start: 2022-03-23 | End: 2022-03-24 | Stop reason: HOSPADM

## 2022-03-23 RX ORDER — SIMETHICONE 80 MG
1 TABLET,CHEWABLE ORAL 4 TIMES DAILY PRN
Status: DISCONTINUED | OUTPATIENT
Start: 2022-03-23 | End: 2022-03-24 | Stop reason: HOSPADM

## 2022-03-23 RX ORDER — IBUPROFEN 200 MG
24 TABLET ORAL
Status: DISCONTINUED | OUTPATIENT
Start: 2022-03-23 | End: 2022-03-24 | Stop reason: HOSPADM

## 2022-03-23 RX ORDER — SODIUM CHLORIDE 0.9 % (FLUSH) 0.9 %
10 SYRINGE (ML) INJECTION
Status: DISCONTINUED | OUTPATIENT
Start: 2022-03-23 | End: 2022-03-24 | Stop reason: HOSPADM

## 2022-03-23 RX ADMIN — OXYCODONE HYDROCHLORIDE 10 MG: 10 TABLET ORAL at 08:03

## 2022-03-23 RX ADMIN — GABAPENTIN 300 MG: 300 CAPSULE ORAL at 08:03

## 2022-03-23 RX ADMIN — CEFTRIAXONE 2 G: 2 INJECTION, SOLUTION INTRAVENOUS at 11:03

## 2022-03-23 RX ADMIN — PANTOPRAZOLE SODIUM 40 MG: 40 TABLET, DELAYED RELEASE ORAL at 08:03

## 2022-03-23 RX ADMIN — MIRTAZAPINE 30 MG: 15 TABLET, ORALLY DISINTEGRATING ORAL at 08:03

## 2022-03-23 RX ADMIN — HYDROMORPHONE HYDROCHLORIDE 1 MG: 1 INJECTION, SOLUTION INTRAMUSCULAR; INTRAVENOUS; SUBCUTANEOUS at 10:03

## 2022-03-23 RX ADMIN — ZOLPIDEM TARTRATE 10 MG: 5 TABLET ORAL at 08:03

## 2022-03-23 RX ADMIN — NADOLOL 40 MG: 20 TABLET ORAL at 08:03

## 2022-03-23 RX ADMIN — POTASSIUM CHLORIDE 10 MEQ: 10 CAPSULE, COATED, EXTENDED RELEASE ORAL at 08:03

## 2022-03-23 RX ADMIN — HYDROMORPHONE HYDROCHLORIDE 1 MG: 1 INJECTION, SOLUTION INTRAMUSCULAR; INTRAVENOUS; SUBCUTANEOUS at 02:03

## 2022-03-23 RX ADMIN — KETOROLAC TROMETHAMINE 10 MG: 30 INJECTION, SOLUTION INTRAMUSCULAR at 08:03

## 2022-03-23 RX ADMIN — DRONABINOL 5 MG: 2.5 CAPSULE ORAL at 04:03

## 2022-03-23 RX ADMIN — CLONAZEPAM 1 MG: 0.5 TABLET ORAL at 10:03

## 2022-03-23 NOTE — H&P
[facility] - Telemetry [facility] ([address] Medicine  History & Physical    Patient Name: Ivy Salgado  MRN: [id number]  Patient Class: OP- Observation  Admission Date: 3/23/2022  Attending Physician: Stormy Delacruz PA-C  Primary Care Provider: Kalia Astorga MD         Patient information was obtained from patient, past medical records and ER records.     Subjective:     Principal Problem:Pyelonephritis    Chief Complaint:   Chief Complaint   Patient presents with    Flank Pain     L sided flank pain onset yesterday. Hx of kidney stones        HPI: Ivy Salgado is a 41 y.o. female with a past history of Crohn's disease with ostomy, HTN, anemia, non-obstructive nephrolithiasis, and QT prolongation presenting to the ED with bilateral flank pain and fever. Patient reports she had a fever of 100.7 three days ago and developed bilateral flank two days ago. Reports the pain with worse in the right flank. Patient started experiencing dysuria, urinary incontinence, and nausea this morning. Patient reports that she has a history of chronic UTI and has been prescribed keflex as prophylaxis in the past. Denies CP, SOB, vomiting, diarrhea, or dizziness.    In the ED: VSSAF. UA with evidence of UTI. CT renal study without evidence of obstructive stone. Lactate negative. Other intake labs largely unremarkable. Given one dose of 1g IV ceftriaxone and 2L NS. Will admit to observation for IV antibiotics and pain control.       Past Medical History:   Diagnosis Date    Abnormal Pap smear 2007    Abnormal Pap smear 5/26/2011    Anemia     Anxiety     Arthritis     C. difficile diarrhea     Crohn's disease     Depression 8/5/2017    Encounter for blood transfusion     Genital HSV     History of colposcopy with cervical biopsy 2007 and 7/2011 2007-LYLA I  and 7/2011- LYLA I    Hypertension     Kidney stone     Kidney stone     Melanoma     Recurrent UTI 4/3/2013    S/P ileostomy 7/9/2012     Sterilization 6/23/2012       Past Surgical History:   Procedure Laterality Date    ABDOMINAL SURGERY      APPENDECTOMY      BILATERAL SALPINGO-OOPHORECTOMY (BSO) Bilateral 5/30/2019    Procedure: SALPINGO-OOPHORECTOMY, BILATERAL;  Surgeon: Rupa German MD;  Location: [facility] OR [address] [place] [address];  Service: OB/GYN;  Laterality: Bilateral;    BLADDER SURGERY      partial cystectomy due to fistula    [facility]      COLON SURGERY      COLONOSCOPY      CYSTOSCOPY  9/23/2020    Procedure: CYSTOSCOPY;  Surgeon: Sascha Florentino MD;  Location: [facility] OR [address] [place] [address];  Service: Urology;;    CYSTOSCOPY W/ URETERAL STENT PLACEMENT Left 9/15/2020    Procedure: CYSTOSCOPY, WITH URETERAL STENT INSERTION;  Surgeon: Sascha Florentino MD;  Location: [facility] OR [address] [place] [address];  Service: Urology;  Laterality: Left;    DIAGNOSTIC LAPAROSCOPY N/A 7/9/2020    Procedure: LAPAROSCOPY, DIAGNOSTIC;  Surgeon: Gilson El MD;  Location: [facility];  Service: OB/GYN;  Laterality: N/A;    EXCISION OF MELANOMA  07/17/2019    ILEOSTOMY      LAPAROSCOPIC LYSIS OF ADHESIONS N/A 7/9/2020    Procedure: LYSIS, ADHESIONS, LAPAROSCOPIC;  Surgeon: Gilson El MD;  Location: [facility];  Service: OB/GYN;  Laterality: N/A;    LASER LITHOTRIPSY  9/23/2020    Procedure: LITHOTRIPSY, USING LASER;  Surgeon: Sascha Florentino MD;  Location: [address];  Service: Urology;;    LYSIS OF ADHESIONS N/A 5/30/2019    Procedure: LYSIS, ADHESIONS;  Surgeon: Rupa German MD;  Location: [facility] OR [address] [place] [address];  Service: OB/GYN;  Laterality: N/A;    OOPHORECTOMY Right 04/16/2015    PORTACATH PLACEMENT  02/21/2017    SKIN BIOPSY      SMALL INTESTINE SURGERY      age 18 Y    TOTAL ABDOMINAL HYSTERECTOMY  04/16/2015    TOTAL COLECTOMY      TUBAL LIGATION  06/06/2012    UPPER GASTROINTESTINAL ENDOSCOPY      URETEROSCOPIC REMOVAL OF URETERIC CALCULUS  9/23/2020    Procedure: REMOVAL, CALCULUS, URETER, URETEROSCOPIC;  Surgeon: Sascha Florentino MD;  Location: [facility]  OR 1ST FLR;  Service: Urology;;    URETEROSCOPY Left 9/23/2020    Procedure: URETEROSCOPY;  Surgeon: Sascha Florentino MD;  Location: [facility] OR 1ST FLR;  Service: Urology;  Laterality: Left;       Review of patient's allergies indicates:   Allergen Reactions    Azathioprine sodium Other (See Comments)     Other reaction(s): pancreatitis  Other reaction(s): pancreatitis    Methotrexate analogues Other (See Comments)     leukopenia    Stelara [ustekinumab] Other (See Comments)     Multiple infections    Zofran [ondansetron hcl (pf)] Other (See Comments)     Per patient causes prolong QT    Vancomycin analogues Hives    Morphine Itching and Other (See Comments)     Other reaction(s): Itching    Bactrim [sulfamethoxazole-trimethoprim] Palpitations    Ciprofloxacin Palpitations       Current Facility-Administered Medications on File Prior to Encounter   Medication    estradiol valerate (DELESTROGEN) injection 20 mg/mL     Current Outpatient Medications on File Prior to Encounter   Medication Sig    aspirin 81 MG Chew Take 81 mg by mouth once daily.    clonazePAM (KLONOPIN) 1 MG tablet TAKE 1 1/2 TABLETS BY MOUTH TWICE DAILY AS NEEDED FOR ANXIETY    diphenoxylate-atropine 2.5-0.025 mg (LOMOTIL) 2.5-0.025 mg per tablet Take 1 tablet by mouth 4 (four) times daily as needed for Diarrhea. for diarrhea    dronabinoL (MARINOL) 5 MG capsule TAKE 1 CAPSULE BY MOUTH TWICE DAILY BEFORE MEALS    famotidine (PEPCID) 20 MG tablet Take 20 mg by mouth 2 (two) times daily.    fluconazole (DIFLUCAN) 150 MG Tab TAKE 1 TABLET(150 MG) BY MOUTH EVERY 72 HOURS AS NEEDED    gabapentin (NEURONTIN) 300 MG capsule TAKE ONE CAPSULE BY MOUTH ONCE DAILY. AFTER 2 WEEKS, TAKE 1 CAPSULE TWICE DAILY    loperamide (IMODIUM) 2 mg capsule Take 2 mg by mouth daily as needed for Diarrhea.    metroNIDAZOLE (METROGEL) 0.75 % vaginal gel Place 1 applicator vaginally 2 (two) times daily. (Patient not taking: No sig reported)    mirtazapine  (REMERON SOL-TAB) 30 MG disintegrating tablet DISSOLVE 1 TABLET(30 MG) ON THE TONGUE EVERY NIGHT    multivitamin (THERAGRAN) per tablet Take 1 tablet by mouth once daily.    nadoloL (CORGARD) 40 MG tablet Take 1 tablet (40 mg total) by mouth once daily.    pantoprazole (PROTONIX) 40 MG tablet Take 1 tablet (40 mg total) by mouth 2 (two) times daily.    potassium citrate (UROCIT-K 15) 15 mEq TbSR Take 2 tablets by mouth 2 (two) times daily. (Patient not taking: No sig reported)    predniSONE (DELTASONE) 20 MG tablet Take 1 pill po qam with breakfast x 3-5 days.    promethazine (PHENERGAN) 25 MG tablet TAKE 1 TABLET BY MOUTH EVERY 8 HOURS FOR NAUSEA    sumatriptan (IMITREX) 50 MG tablet TAKE 1 TABLET BY MOUTH AS NEEDED FOR MIGRAINE. MAY REPEAT ONCE IN 2 HRS. DO NOT EXCEED 2 TABS IN 24 HRS    valACYclovir (VALTREX) 500 MG tablet Take 1 tablet (500 mg total) by mouth once daily.    vedolizumab (ENTYVIO) 300 mg SolR injection Inject 300 mg into the vein.    zolpidem (AMBIEN) 10 mg Tab TAKE 1 TABLET BY MOUTH EVERY NIGHT AT BEDTIME     Family History       Problem Relation (Age of Onset)    Cancer Father, Maternal Grandfather    Colon cancer Father    Crohn's disease Brother    Diabetes Maternal Grandfather, Paternal Grandfather    Endometrial cancer Maternal Aunt    Hearing loss Paternal Grandmother    Heart disease Maternal Grandfather    Hyperlipidemia Father    Hypertension Mother    Liver cancer Father    Skin cancer Maternal Grandfather          Tobacco Use    Smoking status: Never Smoker    Smokeless tobacco: Never Used   Substance and Sexual Activity    Alcohol use: Not Currently     Alcohol/week: 0.0 standard drinks    Drug use: No    Sexual activity: Yes     Partners: Male     Birth control/protection: See Surgical Hx     Comment: HYST     Review of Systems   Constitutional:  Positive for fever. Negative for chills and fatigue.   HENT:  Negative for congestion, sore throat and trouble swallowing.     Eyes:  Negative for photophobia and visual disturbance.   Respiratory:  Negative for cough and shortness of breath.    Cardiovascular:  Negative for chest pain and leg swelling.   Gastrointestinal:  Positive for abdominal pain and nausea. Negative for abdominal distention, diarrhea and vomiting.   Genitourinary:  Positive for dysuria, enuresis, flank pain and pelvic pain. Negative for decreased urine volume, difficulty urinating, frequency, vaginal bleeding and vaginal discharge.   Musculoskeletal:  Negative for arthralgias and myalgias.   Neurological:  Negative for dizziness, weakness and headaches.   Objective:     Vital Signs (Most Recent):  Temp: 98.5 °F (36.9 °C) (03/23/22 1023)  Pulse: 68 (03/23/22 1203)  Resp: 16 (03/23/22 1407)  BP: 123/76 (03/23/22 1203)  SpO2: 96 % (03/23/22 1203) Vital Signs (24h Range):  Temp:  [98 °F (36.7 °C)-98.5 °F (36.9 °C)] 98.5 °F (36.9 °C)  Pulse:  [68-78] 68  Resp:  [15-20] 16  SpO2:  [96 %-99 %] 96 %  BP: (123-148)/(76-86) 123/76     Weight: 49.4 kg (109 lb)  Body mass index is 20.6 kg/m².    Physical Exam  Vitals and nursing note reviewed.   Constitutional:       General: She is not in acute distress.     Appearance: Normal appearance. She is well-developed.   HENT:      Head: Normocephalic and atraumatic.   Eyes:      Extraocular Movements: Extraocular movements intact.      Conjunctiva/sclera: Conjunctivae normal.   Cardiovascular:      Rate and Rhythm: Normal rate and regular rhythm.      Heart sounds: Normal heart sounds.   Pulmonary:      Effort: Pulmonary effort is normal. No respiratory distress.      Breath sounds: Normal breath sounds. No wheezing.   Abdominal:      General: Abdomen is flat. A surgical scar is present. The ostomy site is clean. Bowel sounds are normal. There is no distension.      Palpations: Abdomen is soft.      Tenderness: There is abdominal tenderness in the suprapubic area. There is right CVA tenderness and left CVA tenderness.      Comments:  R CVA tenderness >  L   Musculoskeletal:         General: No tenderness. Normal range of motion.      Cervical back: Normal range of motion and neck supple.   Lymphadenopathy:      Cervical: No cervical adenopathy.   Skin:     General: Skin is warm and dry.      Capillary Refill: Capillary refill takes less than 2 seconds.      Findings: No rash.   Neurological:      Mental Status: She is alert and oriented to person, place, and time.      Cranial Nerves: No cranial nerve deficit.      Sensory: No sensory deficit.      Coordination: Coordination normal.   Psychiatric:         Behavior: Behavior normal.         Thought Content: Thought content normal.         Judgment: Judgment normal.           Significant Labs: All pertinent labs within the past 24 hours have been reviewed.  BMP:   Recent Labs   Lab 03/23/22  0829   GLU 98      K 3.8      CO2 23   BUN 10   CREATININE 0.8   CALCIUM 9.7     CBC:   Recent Labs   Lab 03/23/22  0829   WBC 8.92   HGB 13.6   HCT 40.0        Urine Studies:   Recent Labs   Lab 03/23/22  0834   COLORU Yellow   APPEARANCEUA Cloudy*   PHUR 6.0   SPECGRAV 1.020   PROTEINUA Negative   GLUCUA Negative   KETONESU Negative   BILIRUBINUA Negative   OCCULTUA 2+*   NITRITE Positive*   LEUKOCYTESUR 3+*   RBCUA 59*   WBCUA >100*   BACTERIA Many*   SQUAMEPITHEL 5   HYALINECASTS 4*       Significant Imaging: I have reviewed all pertinent imaging results/findings within the past 24 hours.    Assessment/Plan:     * Pyelonephritis  History of recurrent UTI  Abdominal pain    Patient presents with fever, nausea, and bilateral flank pain progressing over the past 3 days.  - UA + for UTI, follow cultures  - CT renal stone without evidence of obstructing stones  - hx of chronic UTIs with cultures growing klebsiella  - received 2g IV ceftriaxone and 2L NS in the ED  - continue ceftriaxone 1g IV daily        Crohn's disease of small intestine  S/p ileostomy    - Patient reports that she is  currently in remission  - Ostomy clean with good recent output      History of prolonged Q-T interval on ECG  - Continue nadolol 40 mg qhs  - Use anti-emetics with caution  - EKG prn      Poor venous access  - Central line placed in the ED      Acid reflux  - Continue protonix daily      Generalized anxiety disorder  - Continue clonazepam prn        VTE Risk Mitigation (From admission, onward)         Ordered     IP VTE LOW RISK PATIENT  Once         03/23/22 1413                   Stormy Delacruz PA-C  Department of Hospital Medicine   [facility] - Telemetry Stepdown ([place]-)

## 2022-03-23 NOTE — ED PROVIDER NOTES
Encounter Date: 3/23/2022       History     Chief Complaint   Patient presents with    Flank Pain     L sided flank pain onset yesterday. Hx of kidney stones     41-year-old female with history of Crohn's, hypertension, anemia, kidney stones presents to the ED complaining of left flank pain that started yesterday.  The pain has been constant, progressively worsening, radiating to the groin.  She reports a fever yesterday of 100.7° F, chills, dysuria, urinary frequency, palpitations, nausea without vomiting, hematuria.  She does report a history of ureteral stent placement.  She denies chest pain, shortness of breath, diarrhea, headache, urinary urgency.  No trauma.    The history is provided by the patient.     Review of patient's allergies indicates:   Allergen Reactions    Azathioprine sodium Other (See Comments)     Other reaction(s): pancreatitis  Other reaction(s): pancreatitis    Methotrexate analogues Other (See Comments)     leukopenia    Stelara [ustekinumab] Other (See Comments)     Multiple infections    Zofran [ondansetron hcl (pf)] Other (See Comments)     Per patient causes prolong QT    Vancomycin analogues Hives    Morphine Itching and Other (See Comments)     Other reaction(s): Itching    Bactrim [sulfamethoxazole-trimethoprim] Palpitations    Ciprofloxacin Palpitations     Past Medical History:   Diagnosis Date    Abnormal Pap smear 2007    Abnormal Pap smear 5/26/2011    Anemia     Anxiety     Arthritis     C. difficile diarrhea     Crohn's disease     Depression 8/5/2017    Encounter for blood transfusion     Genital HSV     History of colposcopy with cervical biopsy 2007 and 7/2011 2007-LYLA I  and 7/2011- LYLA I    Hypertension     Kidney stone     Kidney stone     Melanoma     Recurrent UTI 4/3/2013    S/P ileostomy 7/9/2012    Sterilization 6/23/2012     Past Surgical History:   Procedure Laterality Date    ABDOMINAL SURGERY      APPENDECTOMY      BILATERAL  SALPINGO-OOPHORECTOMY (BSO) Bilateral 5/30/2019    Procedure: SALPINGO-OOPHORECTOMY, BILATERAL;  Surgeon: Rupa German MD;  Location: [facility] OR [address] [place] [address];  Service: OB/GYN;  Laterality: Bilateral;    BLADDER SURGERY      partial cystectomy due to fistula    [facility]      COLON SURGERY      COLONOSCOPY      CYSTOSCOPY  9/23/2020    Procedure: CYSTOSCOPY;  Surgeon: Sascha Florentino MD;  Location: [facility] OR [address] [place] [address];  Service: Urology;;    CYSTOSCOPY W/ URETERAL STENT PLACEMENT Left 9/15/2020    Procedure: CYSTOSCOPY, WITH URETERAL STENT INSERTION;  Surgeon: Sascha Florentino MD;  [facility];  Service: Urology;  Laterality: Left;    DIAGNOSTIC LAPAROSCOPY N/A 7/9/2020    Procedure: LAPAROSCOPY, DIAGNOSTIC;  Surgeon: Gilson El MD;  Location: [facility];  Service: OB/GYN;  Laterality: N/A;    EXCISION OF MELANOMA  07/17/2019    ILEOSTOMY      LAPAROSCOPIC LYSIS OF ADHESIONS N/A 7/9/2020    Procedure: LYSIS, ADHESIONS, LAPAROSCOPIC;  Surgeon: Gilson El MD;  Location: [facility];  Service: OB/GYN;  Laterality: N/A;    LASER LITHOTRIPSY  9/23/2020    Procedure: LITHOTRIPSY, USING LASER;  Surgeon: Sascha Florentino MD;  Location: [facility] OR [address] [place] [address];  Service: Urology;;    LYSIS OF ADHESIONS N/A 5/30/2019    Procedure: LYSIS, ADHESIONS;  Surgeon: Rupa German MD;  Location: [facility] OR [address] [place] [address];  Service: OB/GYN;  Laterality: N/A;    OOPHORECTOMY Right 04/16/2015    PORTACATH PLACEMENT  02/21/2017    SKIN BIOPSY      SMALL INTESTINE SURGERY      age 18 Y    TOTAL ABDOMINAL HYSTERECTOMY  04/16/2015    TOTAL COLECTOMY      TUBAL LIGATION  06/06/2012    UPPER GASTROINTESTINAL ENDOSCOPY      URETEROSCOPIC REMOVAL OF URETERIC CALCULUS  9/23/2020    Procedure: REMOVAL, CALCULUS, URETER, URETEROSCOPIC;  Surgeon: Sascha Florentino MD;  Location: [facility] OR [address] [place] [address];  Service: Urology;;    URETEROSCOPY Left 9/23/2020    Procedure: URETEROSCOPY;  Surgeon: Sascha Florentino MD;  Location:  [facility] OR [facility]ST [facility];  Service: Urology;  Laterality: Left;     Family History   Problem Relation Age of Onset    Colon cancer Father     Cancer Father         Colon    Liver cancer Father     Hyperlipidemia Father     Hypertension Mother     Cancer Maternal Grandfather         Skin    Skin cancer Maternal Grandfather     Diabetes Maternal Grandfather     Heart disease Maternal Grandfather     Endometrial cancer Maternal Aunt     Crohn's disease Brother     Diabetes Paternal Grandfather     Hearing loss Paternal Grandmother     Breast cancer Neg Hx     Ovarian cancer Neg Hx     Melanoma Neg Hx      Social History     Tobacco Use    Smoking status: Never Smoker    Smokeless tobacco: Never Used   Substance Use Topics    Alcohol use: Not Currently     Alcohol/week: 0.0 standard drinks    Drug use: No     Review of Systems   Constitutional: Positive for chills and fever (100.7F yesterday). Negative for diaphoresis.   HENT: Negative for congestion, rhinorrhea and sore throat.    Eyes: Negative for photophobia and visual disturbance.   Respiratory: Negative for cough and shortness of breath.    Cardiovascular: Positive for palpitations. Negative for chest pain.   Gastrointestinal: Positive for abdominal pain and nausea. Negative for constipation, diarrhea and vomiting.   Genitourinary: Positive for dysuria, flank pain, frequency and hematuria. Negative for urgency, vaginal bleeding and vaginal discharge.   Musculoskeletal: Positive for back pain. Negative for arthralgias and myalgias.   Neurological: Negative for light-headedness, numbness and headaches.   Psychiatric/Behavioral: Negative for confusion.       Physical Exam     Initial Vitals [03/23/22 0749]   BP Pulse Resp Temp SpO2   (!) 148/86 78 15 98 °F (36.7 °C) 99 %      MAP       --         Physical Exam    Nursing note and vitals reviewed.  Constitutional: She appears well-developed and well-nourished. She is not diaphoretic. No distress.   HENT:    Head: Normocephalic and atraumatic.   Neck: Neck supple.   Normal range of motion.  Cardiovascular: Normal rate, regular rhythm and normal heart sounds. Exam reveals no gallop and no friction rub.    No murmur heard.  Pulmonary/Chest: Breath sounds normal. She has no wheezes. She has no rhonchi. She has no rales.   Abdominal: Abdomen is soft. Bowel sounds are normal. There is abdominal tenderness in the suprapubic area.   Ostomy noted to the R abdomen   No right CVA tenderness.  No left CVA tenderness. There is no rebound and no guarding.   Musculoskeletal:         General: Normal range of motion.      Cervical back: Normal range of motion and neck supple.     Neurological: She is alert and oriented to person, place, and time.   Skin: Skin is warm and dry. No rash noted. No erythema.   Psychiatric: She has a normal mood and affect.         ED Course   Procedures  Labs Reviewed   URINALYSIS, REFLEX TO URINE CULTURE - Abnormal; Notable for the following components:       Result Value    Appearance, UA Cloudy (*)     Occult Blood UA 2+ (*)     Nitrite, UA Positive (*)     Leukocytes, UA 3+ (*)     All other components within normal limits    Narrative:     Specimen Source->Urine   CBC W/ AUTO DIFFERENTIAL - Abnormal; Notable for the following components:    MCH 31.1 (*)     All other components within normal limits    Narrative:     Release to patient->Immediate   COMPREHENSIVE METABOLIC PANEL - Abnormal; Notable for the following components:    ALT 8 (*)     All other components within normal limits    Narrative:     Release to patient->Immediate   URINALYSIS MICROSCOPIC - Abnormal; Notable for the following components:    RBC, UA 59 (*)     WBC, UA >100 (*)     Bacteria Many (*)     Hyaline Casts, UA 4 (*)     All other components within normal limits    Narrative:     Specimen Source->Urine   CULTURE, URINE   CULTURE, BLOOD   CULTURE, BLOOD   LIPASE    Narrative:     Release to patient->Immediate   LACTIC ACID,  PLASMA    Narrative:     Release to patient->Immediate   HIV 1 / 2 ANTIBODY   HEPATITIS C ANTIBODY   POCT URINE PREGNANCY          Imaging Results          CT Renal Stone Study ABD Pelvis WO (Final result)  Result time 03/23/22 09:36:37    Final result by Rasheed Gonzáles MD (03/23/22 09:36:37)                 Impression:      1. No evidence of obstructive uropathy.  Small bilateral nonobstructing nephrolithiasis.  2. Details of additional stable chronic and incidental findings, as provided in the body of report.      Electronically signed by: Rasheed Gonzáles  Date:    03/23/2022  Time:    09:36             Narrative:    EXAMINATION:  CT RENAL STONE STUDY ABD PELVIS WO    CLINICAL HISTORY:  Flank pain, kidney stone suspected;    TECHNIQUE:  Low dose axial images, sagittal and coronal reformations were obtained from the lung bases to the pubic symphysis.  Contrast was not administered.    COMPARISON:  MRI enterography 04/16/2021.  CT of the abdomen pelvis performed 09/15/2020.    FINDINGS:  Evaluation of the solid organs is limited due to lack of intravenous contrast.    Lower chest: Unremarkable.    URINARY COLLECTING SYSTEM: As seen previously, there are several small foci of increased attenuation centrally in the kidneys, which may relate to small nonobstructing stones and/or medullary nephrocalcinosis.  The presence of concentrated urine may contribute to this picture as well.  No definite findings of obstructive uropathy seen at the present time.    Liver: normal contour    Gallbladder and bile ducts: Unremarkable.    Pancreas: Normal contour.    Spleen: Normal contour.    Adrenals: Normal contour.    Lymph nodes: No abdominal or pelvic lymphadenopathy.    Bowel and mesentery: Small hiatal hernia.  Redemonstrated prior operative change of colectomy.  Right lower quadrant ileostomy is noted, with similar configuration of small peristomal hernia involving a loop of decompressed small bowel when compared to the  09/15/2020 CT examination.  No findings to suggest bowel obstruction at the present time.    Abdominal aorta: Unremarkable.    Inferior vena cava: Unremarkable.    Free fluid or free air: None.    Pelvis: An indeterminate compare nonaggressive well-circumscribed ovoid fat attenuation lesion within the left paramedian presacral soft tissues measuring up to 23 mm, stable.    Body wall: As above.  Redemonstrated dystrophic calcifications in the gluteal soft tissues.    Bones: Unremarkable.                                 Medications   sodium chloride 0.9% flush 10 mL (has no administration in time range)   melatonin tablet 6 mg (has no administration in time range)   albuterol-ipratropium 2.5 mg-0.5 mg/3 mL nebulizer solution 3 mL (has no administration in time range)   prochlorperazine injection Soln 5 mg (has no administration in time range)   simethicone chewable tablet 80 mg (has no administration in time range)   aluminum-magnesium hydroxide-simethicone 200-200-20 mg/5 mL suspension 30 mL (has no administration in time range)   acetaminophen tablet 650 mg (has no administration in time range)   acetaminophen tablet 1,000 mg (has no administration in time range)   naloxone 0.4 mg/mL injection 0.02 mg (has no administration in time range)   glucose chewable tablet 16 g (has no administration in time range)   glucose chewable tablet 24 g (has no administration in time range)   glucagon (human recombinant) injection 1 mg (has no administration in time range)   bisacodyL suppository 10 mg (has no administration in time range)   dextrose 10% bolus 125 mL (has no administration in time range)   dextrose 10% bolus 250 mL (has no administration in time range)   zolpidem tablet 10 mg (has no administration in time range)   pantoprazole EC tablet 40 mg (has no administration in time range)   nadoloL tablet 40 mg (has no administration in time range)   mirtazapine disintegrating tablet 30 mg (has no administration in time  "range)   loperamide capsule 2 mg (has no administration in time range)   gabapentin capsule 300 mg (has no administration in time range)   dronabinoL capsule 5 mg (has no administration in time range)   clonazePAM tablet 1 mg (has no administration in time range)   cefTRIAXone (ROCEPHIN) 1 g/50 mL D5W IVPB (has no administration in time range)   potassium chloride CR capsule 10 mEq (has no administration in time range)   ketorolac injection 9.999 mg (9.999 mg Intravenous Given 3/23/22 0844)   sodium chloride 0.9% bolus 1,000 mL (0 mLs Intravenous Stopped 3/23/22 1057)   HYDROmorphone injection 1 mg (1 mg Intravenous Given 3/23/22 1056)   sodium chloride 0.9% bolus 1,000 mL (0 mLs Intravenous Stopped 3/23/22 1232)   cefTRIAXone (ROCEPHIN) 2 g/50 mL D5W IVPB (0 g Intravenous Stopped 3/23/22 1233)   HYDROmorphone injection 1 mg (1 mg Intravenous Given 3/23/22 1407)     Medical Decision Making:   History:   Old Medical Records: I decided to obtain old medical records.  Clinical Tests:   Lab Tests: Ordered and Reviewed  Radiological Study: Ordered and Reviewed  Sepsis Perfusion Assessment: "I attest a sepsis perfusion exam was performed within 6 hours of sepsis, severe sepsis, or septic shock presentation, following fluid resuscitation."  Other:   I have discussed this case with another health care provider.       <> Summary of the Discussion: Hospital medicine       APC / Resident Notes:   41-year-old female with history of Crohn's, hypertension, anemia, kidney stones presents to the ED complaining of left flank pain that started yesterday.  Vital signs stable.  Regular rate and rhythm.  Lungs are clear.  Ostomy noted to the right lower abdomen.  Abdomen is soft and tender in the suprapubic region.  No CVA tenderness.  Differential diagnosis includes but is not limited to nephrolithiasis, pyelonephritis, musculoskeletal pain, shingles prodrome, Crohn's flare.  Will get labs, CT renal stone for further evaluation.    UA " concerning for infection, nitrite positive 3+ leukocytes.  Urine culture pending.  UPT negative.  No leukocytosis.  Creatinine normal.  Lipase normal.  Lactic acid normal. Blood culture x 2 pending.    CT renal stone with no obstructing stones.     Given 2 g IV Rocephin, 2 L of IV fluids.  She reports persistent pain.  Will place on observation for pyelonephritis.  Discussed with hospital medicine.                  Clinical Impression:   Final diagnoses:  [N12] Pyelonephritis (Primary)          ED Disposition Condition    Observation               Shantel Lee PA-C  03/23/22 4700

## 2022-03-23 NOTE — SUBJECTIVE & OBJECTIVE
Past Medical History:   Diagnosis Date    Abnormal Pap smear 2007    Abnormal Pap smear 5/26/2011    Anemia     Anxiety     Arthritis     C. difficile diarrhea     Crohn's disease     Depression 8/5/2017    Encounter for blood transfusion     Genital HSV     History of colposcopy with cervical biopsy 2007 and 7/2011 2007-LYLA I  and 7/2011- LYLA I    Hypertension     Kidney stone     Kidney stone     Melanoma     Recurrent UTI 4/3/2013    S/P ileostomy 7/9/2012    Sterilization 6/23/2012       Past Surgical History:   Procedure Laterality Date    ABDOMINAL SURGERY      APPENDECTOMY      BILATERAL SALPINGO-OOPHORECTOMY (BSO) Bilateral 5/30/2019    Procedure: SALPINGO-OOPHORECTOMY, BILATERAL;  Surgeon: Rupa German MD;  Location: [facility] OR [address] [place] [address];  Service: OB/GYN;  Laterality: Bilateral;    BLADDER SURGERY      partial cystectomy due to fistula    CK      COLON SURGERY      COLONOSCOPY      CYSTOSCOPY  9/23/2020    Procedure: CYSTOSCOPY;  Surgeon: Sascha Florentino MD;  Location: [facility] OR [address] [place] [address];  Service: Urology;;    CYSTOSCOPY W/ URETERAL STENT PLACEMENT Left 9/15/2020    Procedure: CYSTOSCOPY, WITH URETERAL STENT INSERTION;  Surgeon: Sascha Florentino MD;  Location: [facility] OR [address] [place] [address];  Service: Urology;  Laterality: Left;    DIAGNOSTIC LAPAROSCOPY N/A 7/9/2020    Procedure: LAPAROSCOPY, DIAGNOSTIC;  Surgeon: Gilson El MD;  Location: [facility] OR;  Service: OB/GYN;  Laterality: N/A;    EXCISION OF MELANOMA  07/17/2019    ILEOSTOMY      LAPAROSCOPIC LYSIS OF ADHESIONS N/A 7/9/2020    Procedure: LYSIS, ADHESIONS, LAPAROSCOPIC;  Surgeon: Gilson El MD;  Location: [facility] OR;  Service: OB/GYN;  Laterality: N/A;    LASER LITHOTRIPSY  9/23/2020    Procedure: LITHOTRIPSY, USING LASER;  Surgeon: Sascha Florentino MD;  Location: [facility] OR [address] [place] [address];  Service: Urology;;    LYSIS OF ADHESIONS N/A 5/30/2019    Procedure: LYSIS, ADHESIONS;  Surgeon: Rupa German MD;  Location: [facility] OR [address] [place] [address];  Service:  OB/GYN;  Laterality: N/A;    OOPHORECTOMY Right 04/16/2015    PORTACATH PLACEMENT  02/21/2017    SKIN BIOPSY      SMALL INTESTINE SURGERY      age 18 Y    TOTAL ABDOMINAL HYSTERECTOMY  04/16/2015    TOTAL COLECTOMY      TUBAL LIGATION  06/06/2012    UPPER GASTROINTESTINAL ENDOSCOPY      URETEROSCOPIC REMOVAL OF URETERIC CALCULUS  9/23/2020    Procedure: REMOVAL, CALCULUS, URETER, URETEROSCOPIC;  Surgeon: Sascha Florentino MD;  Location: [facility] OR [address] [place] [address];  Service: Urology;;    URETEROSCOPY Left 9/23/2020    Procedure: URETEROSCOPY;  Surgeon: Sascha Florentino MD;  [facility];  Service: Urology;  Laterality: Left;       Review of patient's allergies indicates:   Allergen Reactions    Azathioprine sodium Other (See Comments)     Other reaction(s): pancreatitis  Other reaction(s): pancreatitis    Methotrexate analogues Other (See Comments)     leukopenia    Stelara [ustekinumab] Other (See Comments)     Multiple infections    Zofran [ondansetron hcl (pf)] Other (See Comments)     Per patient causes prolong QT    Vancomycin analogues Hives    Morphine Itching and Other (See Comments)     Other reaction(s): Itching    Bactrim [sulfamethoxazole-trimethoprim] Palpitations    Ciprofloxacin Palpitations       Current Facility-Administered Medications on File Prior to Encounter   Medication    estradiol valerate (DELESTROGEN) injection 20 mg/mL     Current Outpatient Medications on File Prior to Encounter   Medication Sig    aspirin 81 MG Chew Take 81 mg by mouth once daily.    clonazePAM (KLONOPIN) 1 MG tablet TAKE 1 1/2 TABLETS BY MOUTH TWICE DAILY AS NEEDED FOR ANXIETY    diphenoxylate-atropine 2.5-0.025 mg (LOMOTIL) 2.5-0.025 mg per tablet Take 1 tablet by mouth 4 (four) times daily as needed for Diarrhea. for diarrhea    dronabinoL (MARINOL) 5 MG capsule TAKE 1 CAPSULE BY MOUTH TWICE DAILY BEFORE MEALS    famotidine (PEPCID) 20 MG tablet Take 20 mg by mouth 2 (two) times daily.    fluconazole  (DIFLUCAN) 150 MG Tab TAKE 1 TABLET(150 MG) BY MOUTH EVERY 72 HOURS AS NEEDED    gabapentin (NEURONTIN) 300 MG capsule TAKE ONE CAPSULE BY MOUTH ONCE DAILY. AFTER 2 WEEKS, TAKE 1 CAPSULE TWICE DAILY    loperamide (IMODIUM) 2 mg capsule Take 2 mg by mouth daily as needed for Diarrhea.    metroNIDAZOLE (METROGEL) 0.75 % vaginal gel Place 1 applicator vaginally 2 (two) times daily. (Patient not taking: No sig reported)    mirtazapine (REMERON SOL-TAB) 30 MG disintegrating tablet DISSOLVE 1 TABLET(30 MG) ON THE TONGUE EVERY NIGHT    multivitamin (THERAGRAN) per tablet Take 1 tablet by mouth once daily.    nadoloL (CORGARD) 40 MG tablet Take 1 tablet (40 mg total) by mouth once daily.    pantoprazole (PROTONIX) 40 MG tablet Take 1 tablet (40 mg total) by mouth 2 (two) times daily.    potassium citrate (UROCIT-K 15) 15 mEq TbSR Take 2 tablets by mouth 2 (two) times daily. (Patient not taking: No sig reported)    predniSONE (DELTASONE) 20 MG tablet Take 1 pill po qam with breakfast x 3-5 days.    promethazine (PHENERGAN) 25 MG tablet TAKE 1 TABLET BY MOUTH EVERY 8 HOURS FOR NAUSEA    sumatriptan (IMITREX) 50 MG tablet TAKE 1 TABLET BY MOUTH AS NEEDED FOR MIGRAINE. MAY REPEAT ONCE IN 2 HRS. DO NOT EXCEED 2 TABS IN 24 HRS    valACYclovir (VALTREX) 500 MG tablet Take 1 tablet (500 mg total) by mouth once daily.    vedolizumab (ENTYVIO) 300 mg SolR injection Inject 300 mg into the vein.    zolpidem (AMBIEN) 10 mg Tab TAKE 1 TABLET BY MOUTH EVERY NIGHT AT BEDTIME     Family History       Problem Relation (Age of Onset)    Cancer Father, Maternal Grandfather    Colon cancer Father    Crohn's disease Brother    Diabetes Maternal Grandfather, Paternal Grandfather    Endometrial cancer Maternal Aunt    Hearing loss Paternal Grandmother    Heart disease Maternal Grandfather    Hyperlipidemia Father    Hypertension Mother    Liver cancer Father    Skin cancer Maternal Grandfather          Tobacco Use    Smoking status: Never Smoker     Smokeless tobacco: Never Used   Substance and Sexual Activity    Alcohol use: Not Currently     Alcohol/week: 0.0 standard drinks    Drug use: No    Sexual activity: Yes     Partners: Male     Birth control/protection: See Surgical Hx     Comment: HYST     Review of Systems   Constitutional:  Positive for fever. Negative for chills and fatigue.   HENT:  Negative for congestion, sore throat and trouble swallowing.    Eyes:  Negative for photophobia and visual disturbance.   Respiratory:  Negative for cough and shortness of breath.    Cardiovascular:  Negative for chest pain and leg swelling.   Gastrointestinal:  Positive for abdominal pain and nausea. Negative for abdominal distention, diarrhea and vomiting.   Genitourinary:  Positive for dysuria, enuresis, flank pain and pelvic pain. Negative for decreased urine volume, difficulty urinating, frequency, vaginal bleeding and vaginal discharge.   Musculoskeletal:  Negative for arthralgias and myalgias.   Neurological:  Negative for dizziness, weakness and headaches.   Objective:     Vital Signs (Most Recent):  Temp: 98.5 °F (36.9 °C) (03/23/22 1023)  Pulse: 68 (03/23/22 1203)  Resp: 16 (03/23/22 1407)  BP: 123/76 (03/23/22 1203)  SpO2: 96 % (03/23/22 1203) Vital Signs (24h Range):  Temp:  [98 °F (36.7 °C)-98.5 °F (36.9 °C)] 98.5 °F (36.9 °C)  Pulse:  [68-78] 68  Resp:  [15-20] 16  SpO2:  [96 %-99 %] 96 %  BP: (123-148)/(76-86) 123/76     Weight: 49.4 kg (109 lb)  Body mass index is 20.6 kg/m².    Physical Exam  Vitals and nursing note reviewed.   Constitutional:       General: She is not in acute distress.     Appearance: Normal appearance. She is well-developed.   HENT:      Head: Normocephalic and atraumatic.   Eyes:      Extraocular Movements: Extraocular movements intact.      Conjunctiva/sclera: Conjunctivae normal.   Cardiovascular:      Rate and Rhythm: Normal rate and regular rhythm.      Heart sounds: Normal heart sounds.   Pulmonary:      Effort: Pulmonary  effort is normal. No respiratory distress.      Breath sounds: Normal breath sounds. No wheezing.   Abdominal:      General: Abdomen is flat. A surgical scar is present. The ostomy site is clean. Bowel sounds are normal. There is no distension.      Palpations: Abdomen is soft.      Tenderness: There is abdominal tenderness in the suprapubic area. There is right CVA tenderness and left CVA tenderness.      Comments: R CVA tenderness >  L   Musculoskeletal:         General: No tenderness. Normal range of motion.      Cervical back: Normal range of motion and neck supple.   Lymphadenopathy:      Cervical: No cervical adenopathy.   Skin:     General: Skin is warm and dry.      Capillary Refill: Capillary refill takes less than 2 seconds.      Findings: No rash.   Neurological:      Mental Status: She is alert and oriented to person, place, and time.      Cranial Nerves: No cranial nerve deficit.      Sensory: No sensory deficit.      Coordination: Coordination normal.   Psychiatric:         Behavior: Behavior normal.         Thought Content: Thought content normal.         Judgment: Judgment normal.           Significant Labs: All pertinent labs within the past 24 hours have been reviewed.  BMP:   Recent Labs   Lab 03/23/22  0829   GLU 98      K 3.8      CO2 23   BUN 10   CREATININE 0.8   CALCIUM 9.7     CBC:   Recent Labs   Lab 03/23/22  0829   WBC 8.92   HGB 13.6   HCT 40.0        Urine Studies:   Recent Labs   Lab 03/23/22  0834   COLORU Yellow   APPEARANCEUA Cloudy*   PHUR 6.0   SPECGRAV 1.020   PROTEINUA Negative   GLUCUA Negative   KETONESU Negative   BILIRUBINUA Negative   OCCULTUA 2+*   NITRITE Positive*   LEUKOCYTESUR 3+*   RBCUA 59*   WBCUA >100*   BACTERIA Many*   SQUAMEPITHEL 5   HYALINECASTS 4*       Significant Imaging: I have reviewed all pertinent imaging results/findings within the past 24 hours.

## 2022-03-23 NOTE — HPI
Ivy Salgado is a 41 y.o. female with a past history of Crohn's disease with ostomy, HTN, anemia, non-obstructive nephrolithiasis, and QT prolongation presenting to the ED with bilateral flank pain and fever. Patient reports she had a fever of 100.7 three days ago and developed bilateral flank two days ago. Reports the pain with worse in the right flank. Patient started experiencing dysuria, urinary incontinence, and nausea this morning. Patient reports that she has a history of chronic UTI and has been prescribed keflex as prophylaxis in the past. Denies CP, SOB, vomiting, diarrhea, or dizziness.    In the ED: VSSAF. UA with evidence of UTI. CT renal study without evidence of obstructive stone. Lactate negative. Other intake labs largely unremarkable. Given one dose of 1g IV ceftriaxone and 2L NS. Will admit to observation for IV antibiotics and pain control.

## 2022-03-23 NOTE — ASSESSMENT & PLAN NOTE
History of recurrent UTI  Abdominal pain    Patient presents with fever, nausea, and bilateral flank pain progressing over the past 3 days.  - UA + for UTI, follow cultures  - CT renal stone without evidence of obstructing stones  - hx of chronic UTIs with cultures growing klebsiella  - received 2g IV ceftriaxone and 2L NS in the ED  - continue ceftriaxone 1g IV daily

## 2022-03-23 NOTE — ED TRIAGE NOTES
Patient presents to the ER with left flank pain that started yesterday, patient has a history of kidney stones and says it feels the same as in the past. Patient states when she urinated she is only able to get a few dribbles out. Patient denies chest pain or shortness of breath.

## 2022-03-23 NOTE — ASSESSMENT & PLAN NOTE
S/p ileostomy    - Patient reports that she is currently in remission  - Ostomy clean with good recent output

## 2022-03-23 NOTE — NURSING
Patient arrived to the floor by wheelchair, A, O, x 4, currently denies pain, no outward s/s of distress, VSS. Patient up and ambulating, LUE chest port that is accessed, lower right side ileostomy that patient cares for independently. No questions or concerns noted, patient verbalized understanding of admission, bed locked, call light and personal belongings within reach. Refer to patient chart and flowsheet for more information.

## 2022-03-24 ENCOUNTER — TELEPHONE (OUTPATIENT)
Dept: PHARMACY | Facility: CLINIC | Age: 42
End: 2022-03-24
Payer: MEDICARE

## 2022-03-24 VITALS
SYSTOLIC BLOOD PRESSURE: 113 MMHG | RESPIRATION RATE: 16 BRPM | HEIGHT: 61 IN | WEIGHT: 125.69 LBS | OXYGEN SATURATION: 97 % | DIASTOLIC BLOOD PRESSURE: 74 MMHG | TEMPERATURE: 98 F | BODY MASS INDEX: 23.73 KG/M2 | HEART RATE: 61 BPM

## 2022-03-24 LAB
ANION GAP SERPL CALC-SCNC: 8 MMOL/L (ref 8–16)
BACTERIA UR CULT: ABNORMAL
BASOPHILS # BLD AUTO: 0.01 K/UL (ref 0–0.2)
BASOPHILS NFR BLD: 0.1 % (ref 0–1.9)
BUN SERPL-MCNC: 6 MG/DL (ref 6–20)
CALCIUM SERPL-MCNC: 8.8 MG/DL (ref 8.7–10.5)
CHLORIDE SERPL-SCNC: 108 MMOL/L (ref 95–110)
CO2 SERPL-SCNC: 23 MMOL/L (ref 23–29)
CREAT SERPL-MCNC: 0.6 MG/DL (ref 0.5–1.4)
DIFFERENTIAL METHOD: ABNORMAL
EOSINOPHIL # BLD AUTO: 0 K/UL (ref 0–0.5)
EOSINOPHIL NFR BLD: 0.3 % (ref 0–8)
ERYTHROCYTE [DISTWIDTH] IN BLOOD BY AUTOMATED COUNT: 13.2 % (ref 11.5–14.5)
EST. GFR  (AFRICAN AMERICAN): >60 ML/MIN/1.73 M^2
EST. GFR  (NON AFRICAN AMERICAN): >60 ML/MIN/1.73 M^2
GLUCOSE SERPL-MCNC: 89 MG/DL (ref 70–110)
HCT VFR BLD AUTO: 36.9 % (ref 37–48.5)
HGB BLD-MCNC: 12.1 G/DL (ref 12–16)
IMM GRANULOCYTES # BLD AUTO: 0.01 K/UL (ref 0–0.04)
IMM GRANULOCYTES NFR BLD AUTO: 0.1 % (ref 0–0.5)
LYMPHOCYTES # BLD AUTO: 2 K/UL (ref 1–4.8)
LYMPHOCYTES NFR BLD: 25.7 % (ref 18–48)
MCH RBC QN AUTO: 30.5 PG (ref 27–31)
MCHC RBC AUTO-ENTMCNC: 32.8 G/DL (ref 32–36)
MCV RBC AUTO: 93 FL (ref 82–98)
MONOCYTES # BLD AUTO: 0.6 K/UL (ref 0.3–1)
MONOCYTES NFR BLD: 7.2 % (ref 4–15)
NEUTROPHILS # BLD AUTO: 5.2 K/UL (ref 1.8–7.7)
NEUTROPHILS NFR BLD: 66.6 % (ref 38–73)
NRBC BLD-RTO: 0 /100 WBC
PLATELET # BLD AUTO: 268 K/UL (ref 150–450)
PMV BLD AUTO: 10.5 FL (ref 9.2–12.9)
POTASSIUM SERPL-SCNC: 3.8 MMOL/L (ref 3.5–5.1)
RBC # BLD AUTO: 3.97 M/UL (ref 4–5.4)
SODIUM SERPL-SCNC: 139 MMOL/L (ref 136–145)
WBC # BLD AUTO: 7.75 K/UL (ref 3.9–12.7)

## 2022-03-24 PROCEDURE — 80048 BASIC METABOLIC PNL TOTAL CA: CPT

## 2022-03-24 PROCEDURE — G0378 HOSPITAL OBSERVATION PER HR: HCPCS

## 2022-03-24 PROCEDURE — 99217 PR OBSERVATION CARE DISCHARGE: ICD-10-PCS | Mod: ,,, | Performed by: PHYSICIAN ASSISTANT

## 2022-03-24 PROCEDURE — 99217 PR OBSERVATION CARE DISCHARGE: CPT | Mod: ,,, | Performed by: PHYSICIAN ASSISTANT

## 2022-03-24 PROCEDURE — 85025 COMPLETE CBC W/AUTO DIFF WBC: CPT

## 2022-03-24 PROCEDURE — 96366 THER/PROPH/DIAG IV INF ADDON: CPT | Performed by: EMERGENCY MEDICINE

## 2022-03-24 PROCEDURE — 36415 COLL VENOUS BLD VENIPUNCTURE: CPT

## 2022-03-24 PROCEDURE — 25000003 PHARM REV CODE 250: Performed by: INTERNAL MEDICINE

## 2022-03-24 PROCEDURE — 25000003 PHARM REV CODE 250

## 2022-03-24 PROCEDURE — 94760 N-INVAS EAR/PLS OXIMETRY 1: CPT

## 2022-03-24 PROCEDURE — 63600175 PHARM REV CODE 636 W HCPCS

## 2022-03-24 PROCEDURE — 63600175 PHARM REV CODE 636 W HCPCS: Performed by: INTERNAL MEDICINE

## 2022-03-24 RX ORDER — PROMETHAZINE HYDROCHLORIDE 25 MG/1
TABLET ORAL
Qty: 15 TABLET | Refills: 0 | Status: SHIPPED | OUTPATIENT
Start: 2022-03-24 | End: 2022-04-01

## 2022-03-24 RX ORDER — CEFDINIR 300 MG/1
300 CAPSULE ORAL 2 TIMES DAILY
Qty: 20 CAPSULE | Refills: 0 | Status: SHIPPED | OUTPATIENT
Start: 2022-03-24 | End: 2022-03-30 | Stop reason: SDUPTHER

## 2022-03-24 RX ORDER — HEPARIN 100 UNIT/ML
5 SYRINGE INTRAVENOUS
Status: COMPLETED | OUTPATIENT
Start: 2022-03-24 | End: 2022-03-24

## 2022-03-24 RX ADMIN — CEFTRIAXONE 1 G: 1 INJECTION, SOLUTION INTRAVENOUS at 09:03

## 2022-03-24 RX ADMIN — PANTOPRAZOLE SODIUM 40 MG: 40 TABLET, DELAYED RELEASE ORAL at 09:03

## 2022-03-24 RX ADMIN — DRONABINOL 5 MG: 2.5 CAPSULE ORAL at 05:03

## 2022-03-24 RX ADMIN — Medication 1 CAPSULE: at 09:03

## 2022-03-24 RX ADMIN — HEPARIN SODIUM (PORCINE) LOCK FLUSH IV SOLN 100 UNIT/ML 500 UNITS: 100 SOLUTION at 02:03

## 2022-03-24 RX ADMIN — POTASSIUM CHLORIDE 10 MEQ: 10 CAPSULE, COATED, EXTENDED RELEASE ORAL at 09:03

## 2022-03-24 RX ADMIN — OXYCODONE HYDROCHLORIDE 10 MG: 10 TABLET ORAL at 05:03

## 2022-03-24 RX ADMIN — GABAPENTIN 300 MG: 300 CAPSULE ORAL at 09:03

## 2022-03-24 NOTE — PLAN OF CARE
Problem: Adult Inpatient Plan of Care  Goal: Plan of Care Review  Outcome: Ongoing, Progressing  Goal: Patient-Specific Goal (Individualized)  Outcome: Ongoing, Progressing  Goal: Absence of Hospital-Acquired Illness or Injury  Outcome: Ongoing, Progressing  Goal: Optimal Comfort and Wellbeing  Outcome: Ongoing, Progressing  Goal: Readiness for Transition of Care  Outcome: Ongoing, Progressing   No acute events during shift.

## 2022-03-24 NOTE — NURSING
Pt discharged to home. Pt verbalized understanding discharge instruction, how to take prescriptions, and the importance of attending follow up appointments. Patient and family given opportunity to ask questions. Port-a cath deaccess. NAD noted. Patient denies escort and is ambulating to vehicle with .

## 2022-03-24 NOTE — PLAN OF CARE
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]    Admission Date: 3/23/2022  Expected Discharge Date: 3/24/2022         Payor: MEDICARE / Plan: MEDICARE PART A & B / Product Type: Government /     Extended Emergency Contact Information  Primary Emergency Contact: Milan Salgado  Address: [address] [place] [address] [facility]  Home Phone: [phone]  Mobile Phone: [phone]  Relation: Father  Secondary Emergency Contact: Shaila Salgado   [place]  Mobile Phone: [phone]  Relation: Mother    Discharge Plan A: (P) Home with family  Discharge Plan B: (P) Home with family      [id number] [address]  AT [facility] [address] [facility]  [address] [facility] [address]  Phone: [phone] Fax: [phone]    [facility] #[id number]  [place] - [address]  AT [facility] [address]  [address] [place] [address]  Phone: [phone] Fax: [phone]               SW completed Discharge Planning Assessment with patient via bedside. Discharge planning booklet given to patient/family and whiteboard updated with CHRISTOPH and phone #. All questions answered.    Patient reported that family will provide transportation upon discharge.     Patient reported that she lives at home with her boyfriend and her daughter. Patient reported that prior to hospitalization she was independent with her ADL's. Patient reported that she is not on dialysis and she does not go to a Coumadin clinic.     Patient lives in a [facility] with one step to enter.         Renetta Goncalves LMSW  [facility]  [place]  Ext. [phone]

## 2022-03-25 ENCOUNTER — TELEPHONE (OUTPATIENT)
Dept: INTERNAL MEDICINE | Facility: CLINIC | Age: 42
End: 2022-03-25
Payer: MEDICARE

## 2022-03-25 DIAGNOSIS — N12 PYELONEPHRITIS: Primary | ICD-10-CM

## 2022-03-25 LAB
BACTERIA UR CULT: ABNORMAL
HCV AB SERPL QL IA: NEGATIVE

## 2022-03-25 NOTE — TELEPHONE ENCOUNTER
----- Message from Shelley Alexandre sent at 3/25/2022  3:34 PM CDT -----  Regarding: Requesting Call Back  Contact: PHANI RODRIGUEZ [[id number]]  Type: Patient Returning Call        Who Called:PHANI RODRIGUEZ [[id number]]        Who Left Message for Patient:n/a         Does the patient know what this is regarding? update        Best Call Back Number:[phone]        Additional Information: Pt is not sure who called her phone but she is trying to give an update

## 2022-03-25 NOTE — PLAN OF CARE
[facility] - Telemetry Stepdown ([facility])  Discharge Final Note    Primary Care Provider: Kalia Astorga MD    Expected Discharge Date: 3/24/2022    Patient discharged to home via personal transportation.     Patient's bedside nurse and patient notified of the above.      Final Discharge Note (most recent)       Final Note - 03/25/22 1303          Final Note    Assessment Type Final Discharge Note (P)      Anticipated Discharge Disposition Home or Self Care (P)         Post-Acute Status    Post-Acute Authorization Other (P)      Other Status No Post-Acute Service Needs (P)                      Important Message from Medicare                 Future Appointments   Date Time Provider Department Center   3/28/2022  7:00 AM COVID NURSING HOME, [facility] SPEC LAB [facility] SPECLAB [facility]   3/31/2022  7:00 AM COVID NURSING HOME, [facility] SPEC LAB [facility] SPECL[facility]   4/1/2022 10:00 AM Ian Collins MD [facility]   4/13/2022  9:30 AM Diane Hunter NP [facility] UROLOGY [facility]   4/13/2022 10:45 AM Kalia Astorga MD [facility]       SW scheduled post-discharge follow-up appointment and information added to AVS.     Renetta Goncalves LMSW  [facility]  Ext. [phone]

## 2022-03-25 NOTE — TELEPHONE ENCOUNTER
Called patient to discuss:   Endorses no longer on abx ppx for recurrent UTI's   Didn't feel typical UTI symptoms, but just felt off  Went to ED and admitted for obs due to pyelonephritis   Received two doses of Ceftriaxone in hosp  Discharged with cefdinir 300 mg BID x10 days  Urine culture K pneumoniae sensitive to third gen cephalosporins   Hosp f/u appt with Dr. Cheema 01APR  Urology and Dr. Astorga appt's 13APR    Advised pt will order UA to have completed after finishes abx to ensure clearance of infection with asymptomatic presentation previously.

## 2022-03-25 NOTE — HOSPITAL COURSE
Patient admitted for pyelonephritis, 0/4 SIRS on admission. CT RSS without obstruction nephrolithiasis. Patient improved with IV CTX and supportive care. Ucx shows klebsiella pneumoniae sensitive to CTX. Stable for dc with cefdinir x10d, pcp fu and urology referral to discuss prophylactic abx

## 2022-03-25 NOTE — DISCHARGE SUMMARY
[facility] [address]  [facility]  Discharge Summary      Patient Name: Ivy Salgado  MRN: [id number]  Patient Class: OP- Observation  Admission Date: 3/23/2022  Hospital Length of Stay: 0 days  Discharge Date and Time: 3/24/2022  4:31 PM  Attending Physician: Madison att. providers found   Discharging Provider: Elvi Figueroa PA-C  Primary Care Provider: Kalia Astorga MD  [facility] Team: [facility] F Elvi Figueroa PA-C    HPI:   Ivy Salgado is a 41 y.o. female with a past history of Crohn's disease with ostomy, HTN, anemia, non-obstructive nephrolithiasis, and QT prolongation presenting to the ED with bilateral flank pain and fever. Patient reports she had a fever of 100.7 three days ago and developed bilateral flank two days ago. Reports the pain with worse in the right flank. Patient started experiencing dysuria, urinary incontinence, and nausea this morning. Patient reports that she has a history of chronic UTI and has been prescribed keflex as prophylaxis in the past. Denies CP, SOB, vomiting, diarrhea, or dizziness.    In the ED: VSSAF. UA with evidence of UTI. CT renal study without evidence of obstructive stone. Lactate negative. Other intake labs largely unremarkable. Given one dose of 1g IV ceftriaxone and 2L NS. Will admit to observation for IV antibiotics and pain control.       * No surgery found *      Hospital Course:   Patient admitted for pyelonephritis, 0/4 SIRS on admission. CT RSS without obstruction nephrolithiasis. Patient improved with IV CTX and supportive care. Ucx shows klebsiella pneumoniae sensitive to CTX. Stable for dc with cefdinir x10d, pcp fu and urology referral to discuss prophylactic abx       Goals of Care Treatment Preferences:  Code Status: Full Code    Living Will: Yes              Consults:     * Pyelonephritis  History of recurrent UTI  Abdominal pain    Patient presents with fever, nausea, and bilateral flank pain  progressing over the past 3 days.  - UA + for UTI, follow cultures  - CT renal stone without evidence of obstructing stones  - hx of chronic UTIs with cultures growing klebsiella  - received 2g IV ceftriaxone and 2L NS in the ED  - continue ceftriaxone 1g IV daily  - UCx shows klebs, sensitive to CTX  Stable for dc with cefdinir and phenergan; urology fu; return precautions discussed, no further questions          Final Active Diagnoses:    Diagnosis Date Noted POA    PRINCIPAL PROBLEM:  Pyelonephritis [N12] 03/23/2022 Yes    History of prolonged Q-T interval on ECG [Z87.898] 11/16/2020 Not Applicable    History of recurrent UTI (urinary tract infection) [Z87.440] 03/11/2020 Yes    Acid reflux [K21.9] 05/28/2019 Yes    Poor venous access [I87.8] 05/28/2019 Yes    Abdominal pain [R10.9] 10/12/2016 Yes    Crohn's disease of small intestine [K50.00] 09/18/2013 Yes     Chronic    Generalized anxiety disorder [F41.1] 09/18/2013 Yes    S/P ileostomy [Z93.2] 07/09/2012 Not Applicable     Chronic      Problems Resolved During this Admission:    Diagnosis Date Noted Date Resolved POA    Pelvic mass [R19.00] 10/29/2011 03/23/2022 Yes       Discharged Condition: stable    Disposition: Home or Self Care    Follow Up:    Patient Instructions:      Ambulatory referral/consult to Outpatient Case Management   Referral Priority: Routine Referral Type: Consultation   Referral Reason: Specialty Services Required   Number of Visits Requested: 1     Ambulatory referral/consult to Urology   Standing Status: Future   Referral Priority: Routine Referral Type: Consultation   Referral Reason: Specialty Services Required   Requested Specialty: Urology   Number of Visits Requested: 1     Notify your health care provider if you experience any of the following:  temperature >100.4     Notify your health care provider if you experience any of the following:  severe uncontrolled pain     Notify your health care provider if you experience  any of the following:  persistent nausea and vomiting or diarrhea     Notify your health care provider if you experience any of the following:  redness, tenderness, or signs of infection (pain, swelling, redness, odor or green/yellow discharge around incision site)     Notify your health care provider if you experience any of the following:  worsening rash     Notify your health care provider if you experience any of the following:  increased confusion or weakness     Notify your health care provider if you experience any of the following:  persistent dizziness, light-headedness, or visual disturbances     Activity as tolerated       Significant Diagnostic Studies: Microbiology:   Urine Culture    Lab Results   Component Value Date    LABURIN KLEBSIELLA PNEUMONIAE  >100,000 cfu/ml   (A) 03/23/2022     Radiology: CT scan: CT RSS: no obstructing nephrolithiasis     Pending Diagnostic Studies:     Procedure Component Value Units Date/Time    HIV 1/2 Ag/Ab (4th Gen) [[id number]] Collected: 03/23/22 0829    Order Status: Sent Lab Status: In process Updated: 03/23/22 0846    Specimen: Blood          Medications:  Reconciled Home Medications:      Medication List      START taking these medications    cefdinir 300 MG capsule  Commonly known as: OMNICEF  Take 1 capsule (300 mg total) by mouth 2 (two) times daily. for 10 days        CONTINUE taking these medications    aspirin 81 MG Chew  Take 81 mg by mouth once daily.     clonazePAM 1 MG tablet  Commonly known as: KlonoPIN  TAKE 1 1/2 TABLETS BY MOUTH TWICE DAILY AS NEEDED FOR ANXIETY     diphenoxylate-atropine 2.5-0.025 mg 2.5-0.025 mg per tablet  Commonly known as: LOMOTIL  Take 1 tablet by mouth 4 (four) times daily as needed for Diarrhea. for diarrhea     dronabinoL 5 MG capsule  Commonly known as: MARINOL  TAKE 1 CAPSULE BY MOUTH TWICE DAILY BEFORE MEALS     famotidine 20 MG tablet  Commonly known as: PEPCID  Take 20 mg by mouth 2 (two) times daily.     fluconazole 150  MG Tab  Commonly known as: DIFLUCAN  TAKE 1 TABLET(150 MG) BY MOUTH EVERY 72 HOURS AS NEEDED     gabapentin 300 MG capsule  Commonly known as: NEURONTIN  TAKE ONE CAPSULE BY MOUTH ONCE DAILY. AFTER 2 WEEKS, TAKE 1 CAPSULE TWICE DAILY     loperamide 2 mg capsule  Commonly known as: IMODIUM  Take 2 mg by mouth daily as needed for Diarrhea.     metroNIDAZOLE 0.75 % vaginal gel  Commonly known as: METROGEL  Place 1 applicator vaginally 2 (two) times daily.     mirtazapine 30 MG disintegrating tablet  Commonly known as: REMERON SOL-TAB  DISSOLVE 1 TABLET(30 MG) ON THE TONGUE EVERY NIGHT     multivitamin per tablet  Commonly known as: THERAGRAN  Take 1 tablet by mouth once daily.     nadoloL 40 MG tablet  Commonly known as: CORGARD  Take 1 tablet (40 mg total) by mouth once daily.     pantoprazole 40 MG tablet  Commonly known as: PROTONIX  Take 1 tablet (40 mg total) by mouth 2 (two) times daily.     potassium citrate 15 mEq Tbsr  Commonly known as: UROCIT-K 15  Take 2 tablets by mouth 2 (two) times daily.     predniSONE 20 MG tablet  Commonly known as: DELTASONE  Take 1 pill po qam with breakfast x 3-5 days.     promethazine 25 MG tablet  Commonly known as: PHENERGAN  TAKE 1 TABLET BY MOUTH EVERY 8 HOURS FOR NAUSEA     sumatriptan 50 MG tablet  Commonly known as: IMITREX  TAKE 1 TABLET BY MOUTH AS NEEDED FOR MIGRAINE. MAY REPEAT ONCE IN 2 HRS. DO NOT EXCEED 2 TABS IN 24 HRS     valACYclovir 500 MG tablet  Commonly known as: VALTREX  Take 1 tablet (500 mg total) by mouth once daily.     vedolizumab 300 mg Solr injection  Commonly known as: ENTYVIO  Inject 300 mg into the vein.     zolpidem 10 mg Tab  Commonly known as: AMBIEN  TAKE 1 TABLET BY MOUTH EVERY NIGHT AT BEDTIME            Indwelling Lines/Drains at time of discharge:   Lines/Drains/Airways     Drain  Duration                Ileostomy 06/16/12 0000 RLQ 3569 days                Time spent on the discharge of patient: >35 minutes         Elvi Figueroa  MADHAV  Department of Hospital Medicine  [facility] - Telemetry Stepdown ([place])

## 2022-03-25 NOTE — ASSESSMENT & PLAN NOTE
History of recurrent UTI  Abdominal pain    Patient presents with fever, nausea, and bilateral flank pain progressing over the past 3 days.  - UA + for UTI, follow cultures  - CT renal stone without evidence of obstructing stones  - hx of chronic UTIs with cultures growing klebsiella  - received 2g IV ceftriaxone and 2L NS in the ED  - continue ceftriaxone 1g IV daily  - UCx shows klebs, sensitive to CTX  Stable for dc with cefdinir and phenergan; urology fu; return precautions discussed, no further questions

## 2022-03-25 NOTE — TELEPHONE ENCOUNTER
Called pt back  Spoke to pt  She states she was returning a call from Dr. Ahuja  And thinks it was about her urine culture coming back/if she's on right antibiotics    I couldn't find a note from provider, but routing to her to advise  Pt states she wants to make sure we know she has long QT syndrome if we need to send in antibiotics

## 2022-03-28 LAB
BACTERIA BLD CULT: NORMAL
BACTERIA BLD CULT: NORMAL

## 2022-03-29 ENCOUNTER — PATIENT MESSAGE (OUTPATIENT)
Dept: INTERNAL MEDICINE | Facility: CLINIC | Age: 42
End: 2022-03-29
Payer: MEDICARE

## 2022-03-29 DIAGNOSIS — N39.0 URINARY TRACT INFECTION WITHOUT HEMATURIA, SITE UNSPECIFIED: Primary | ICD-10-CM

## 2022-03-29 NOTE — PROGRESS NOTES
Subjective:       Patient ID: Ivy Salgado is a 41 y.o. female.    Chief Complaint: Urinary Tract Infection    Pt here for f/u from hospital where she was admitted with pyelonephritis--presenting symptoms were bilat flank pain (R>L), fever, nausea and dysuria. Record reviewed. CT showed non-obstructing nephrolithiasis. She was tx with ceftriaxone with some improvement in symptoms and d/c with cefdinir for 10 days which she is still completing. However, she is feeling flank pain on R again along with dysuria. She has had low grade fevers to 99.6 since discharge. She is concerned she is not absorbing oral abx due to her Crohn's. She repeated UA today but is pending still.      She has had issues with recurrent UTI in the past. Also with h/o ureteral stones with h/o stent and removal. Was doing well until above. She has f/u with urology.    Pt had L adnexal mass and saw GYN-onc for ex lap/BSO; mass was benign. 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 Per GYN, this will be followed but no further intervention was required.     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 She has regular f/u with derm.       She has anxiety which is fairly well controlled with current meds. She uses klonopin bid; we discussed continuing to cut back; currently getting 75 tablets per month. She was seeing psychiatry but still trying to est with new provider due to insurance change. No SI/HI.     Crohn's is fairly well controlled. She has some abd pain with nausea and occasional vomiting for which she has d/w GI but doesn't feel this is active crohns. No fever. She is on entyvio. She sees Dr. Ross with GI regularly who manages her chronic opiates and is on pain contract. She has high output from her stoma which GI is managing but there are no good solutions to this.     GERD is well controlled with prn pepcid.  No associated red flag symptoms.    She has osteopenia on DEXA 2/2020. Reiterated importance of ca/d. We discussed other meds but she is not on prednisone now and osteopenia is mild so will hold off.     She has thyroid nodules that were small on last u/s 10/2020 and not meeting criteria for biopsy. Clinically she is euthyroid. Repeat u/s can be done in 10/2021 and if 5 yrs stability (2024) can stop surveillance.     She has prolonged QTc prior EKG with neg genetic testing. Also with sinus tach which results in occasional palpitations. Saw cardiology and recommends staying on nadolol. This is stable.     Review of Systems   Constitutional: Positive for activity change. Negative for fatigue, fever and unexpected weight change.   HENT: Negative for ear pain, hearing loss, rhinorrhea, sore throat and trouble swallowing.    Eyes: Negative for discharge and visual disturbance.   Respiratory: Negative for cough, chest tightness, shortness of breath and wheezing.    Cardiovascular: Positive for palpitations. Negative for chest pain.   Gastrointestinal: Positive for abdominal pain, diarrhea and nausea. Negative for blood in stool, constipation and vomiting.   Endocrine: Negative for polydipsia and polyuria.   Genitourinary: Negative for difficulty urinating, dysuria, frequency, hematuria and menstrual problem.   Musculoskeletal: Negative for arthralgias, joint swelling and neck pain.   Neurological: Negative for weakness, numbness and headaches.   Psychiatric/Behavioral: Negative for confusion and dysphoric mood.       Objective:      Physical Exam  Constitutional:       Appearance: She is well-developed.   HENT:      Head: Normocephalic and atraumatic.      Right Ear: Tympanic membrane, ear canal and external ear normal.      Left Ear: Tympanic membrane, ear canal and external ear normal.      Nose: No mucosal edema or rhinorrhea.      Mouth/Throat:      Pharynx: No oropharyngeal exudate or posterior oropharyngeal  erythema.   Eyes:      Pupils: Pupils are equal, round, and reactive to light.   Neck:      Thyroid: No thyroid mass or thyromegaly.      Vascular: No carotid bruit.   Cardiovascular:      Rate and Rhythm: Normal rate and regular rhythm.      Heart sounds: Normal heart sounds, S1 normal and S2 normal. No murmur heard.  Pulmonary:      Effort: Pulmonary effort is normal.      Breath sounds: Normal breath sounds. No wheezing.   Abdominal:      General: Bowel sounds are normal. There is no distension.      Palpations: Abdomen is soft. There is no mass.      Tenderness: There is no abdominal tenderness.   Musculoskeletal:      Cervical back: Neck supple.   Lymphadenopathy:      Cervical: No cervical adenopathy.   Neurological:      Mental Status: She is alert and oriented to person, place, and time.      Cranial Nerves: No cranial nerve deficit.   Psychiatric:         Judgment: Judgment normal.         Assessment:       1. Crohn's disease of small intestine without complication    2. Crohn's disease of small intestine with complication    3. Generalized anxiety disorder    4. Current mild episode of major depressive disorder without prior episode    5. Herpes genitalis in women    6. Multiple thyroid nodules    7. Palpitations    8. Osteopenia of multiple sites    9. Chronic, continuous use of opioids    10. History of recurrent UTI (urinary tract infection)    11. Seizures    12. Complex partial epilepsy with generalization and with intractable epilepsy    13. Acute pyelonephritis        Plan:       1. Keep f/u with specialists  2. Prior labs reviewed with pt  3. Rocephin 1gm IM today (2 doses of 500mg)  4. Will coordinate with urology about sooner appt  5. Continue with cefidinir for now  6. ED prompts d/w pt and she understood

## 2022-03-30 ENCOUNTER — PATIENT MESSAGE (OUTPATIENT)
Dept: UROLOGY | Facility: CLINIC | Age: 42
End: 2022-03-30

## 2022-03-30 ENCOUNTER — OFFICE VISIT (OUTPATIENT)
Dept: UROLOGY | Facility: CLINIC | Age: 42
End: 2022-03-30
Payer: MEDICARE

## 2022-03-30 ENCOUNTER — OFFICE VISIT (OUTPATIENT)
Dept: INTERNAL MEDICINE | Facility: CLINIC | Age: 42
End: 2022-03-30
Attending: INTERNAL MEDICINE
Payer: MEDICARE

## 2022-03-30 VITALS — HEART RATE: 62 BPM | DIASTOLIC BLOOD PRESSURE: 66 MMHG | SYSTOLIC BLOOD PRESSURE: 143 MMHG

## 2022-03-30 VITALS
HEART RATE: 63 BPM | SYSTOLIC BLOOD PRESSURE: 118 MMHG | BODY MASS INDEX: 20.83 KG/M2 | OXYGEN SATURATION: 98 % | DIASTOLIC BLOOD PRESSURE: 84 MMHG | WEIGHT: 110.31 LBS | HEIGHT: 61 IN

## 2022-03-30 DIAGNOSIS — N10 ACUTE PYELONEPHRITIS: ICD-10-CM

## 2022-03-30 DIAGNOSIS — N20.0 NEPHROLITHIASIS: ICD-10-CM

## 2022-03-30 DIAGNOSIS — K50.019 CROHN'S DISEASE OF SMALL INTESTINE WITH COMPLICATION: ICD-10-CM

## 2022-03-30 DIAGNOSIS — E04.2 MULTIPLE THYROID NODULES: ICD-10-CM

## 2022-03-30 DIAGNOSIS — M85.89 OSTEOPENIA OF MULTIPLE SITES: ICD-10-CM

## 2022-03-30 DIAGNOSIS — R56.9 SEIZURES: ICD-10-CM

## 2022-03-30 DIAGNOSIS — F11.90 CHRONIC, CONTINUOUS USE OF OPIOIDS: ICD-10-CM

## 2022-03-30 DIAGNOSIS — R10.9 FLANK PAIN: Primary | ICD-10-CM

## 2022-03-30 DIAGNOSIS — A60.09 HERPES GENITALIS IN WOMEN: ICD-10-CM

## 2022-03-30 DIAGNOSIS — F32.0 CURRENT MILD EPISODE OF MAJOR DEPRESSIVE DISORDER WITHOUT PRIOR EPISODE: ICD-10-CM

## 2022-03-30 DIAGNOSIS — K50.00 CROHN'S DISEASE OF SMALL INTESTINE WITHOUT COMPLICATION: Primary | ICD-10-CM

## 2022-03-30 DIAGNOSIS — B37.31 VAGINAL YEAST INFECTION: Primary | ICD-10-CM

## 2022-03-30 DIAGNOSIS — F41.1 GENERALIZED ANXIETY DISORDER: ICD-10-CM

## 2022-03-30 DIAGNOSIS — Z87.440 HISTORY OF RECURRENT UTI (URINARY TRACT INFECTION): ICD-10-CM

## 2022-03-30 DIAGNOSIS — G40.219 COMPLEX PARTIAL EPILEPSY WITH GENERALIZATION AND WITH INTRACTABLE EPILEPSY: ICD-10-CM

## 2022-03-30 DIAGNOSIS — R00.2 PALPITATIONS: ICD-10-CM

## 2022-03-30 DIAGNOSIS — N12 PYELONEPHRITIS: ICD-10-CM

## 2022-03-30 LAB
BILIRUB SERPL-MCNC: NEGATIVE MG/DL
BLOOD URINE, POC: NEGATIVE
CLARITY, POC UA: CLEAR
COLOR, POC UA: YELLOW
GLUCOSE UR QL STRIP: NORMAL
KETONES UR QL STRIP: NEGATIVE
LEUKOCYTE ESTERASE URINE, POC: NEGATIVE
NITRITE, POC UA: NEGATIVE
PH, POC UA: 5
PROTEIN, POC: NEGATIVE
SPECIFIC GRAVITY, POC UA: 1.02
UROBILINOGEN, POC UA: NORMAL

## 2022-03-30 PROCEDURE — 99214 OFFICE O/P EST MOD 30 MIN: CPT | Mod: S$PBB,,, | Performed by: INTERNAL MEDICINE

## 2022-03-30 PROCEDURE — 96372 THER/PROPH/DIAG INJ SC/IM: CPT | Mod: PBBFAC

## 2022-03-30 PROCEDURE — 99213 OFFICE O/P EST LOW 20 MIN: CPT | Mod: S$GLB,,, | Performed by: NURSE PRACTITIONER

## 2022-03-30 PROCEDURE — 99999 PR PBB SHADOW E&M-EST. PATIENT-LVL IV: CPT | Mod: PBBFAC,,, | Performed by: INTERNAL MEDICINE

## 2022-03-30 PROCEDURE — 87086 URINE CULTURE/COLONY COUNT: CPT | Performed by: NURSE PRACTITIONER

## 2022-03-30 PROCEDURE — 99213 PR OFFICE/OUTPT VISIT, EST, LEVL III, 20-29 MIN: ICD-10-PCS | Mod: S$GLB,,, | Performed by: NURSE PRACTITIONER

## 2022-03-30 PROCEDURE — 99214 PR OFFICE/OUTPT VISIT, EST, LEVL IV, 30-39 MIN: ICD-10-PCS | Mod: S$PBB,,, | Performed by: INTERNAL MEDICINE

## 2022-03-30 PROCEDURE — 99999 PR PBB SHADOW E&M-EST. PATIENT-LVL IV: ICD-10-PCS | Mod: PBBFAC,,, | Performed by: INTERNAL MEDICINE

## 2022-03-30 PROCEDURE — 99214 OFFICE O/P EST MOD 30 MIN: CPT | Mod: 25,PBBFAC | Performed by: INTERNAL MEDICINE

## 2022-03-30 PROCEDURE — 81002 URINALYSIS NONAUTO W/O SCOPE: CPT | Mod: S$GLB,,, | Performed by: NURSE PRACTITIONER

## 2022-03-30 PROCEDURE — 81002 POCT URINE DIPSTICK WITHOUT MICROSCOPE: ICD-10-PCS | Mod: S$GLB,,, | Performed by: NURSE PRACTITIONER

## 2022-03-30 RX ORDER — CEFTRIAXONE 500 MG/1
500 INJECTION, POWDER, FOR SOLUTION INTRAMUSCULAR; INTRAVENOUS
Status: COMPLETED | OUTPATIENT
Start: 2022-03-30 | End: 2022-03-30

## 2022-03-30 RX ORDER — HYDROCODONE BITARTRATE AND ACETAMINOPHEN 5; 325 MG/1; MG/1
1 TABLET ORAL EVERY 6 HOURS PRN
Qty: 8 TABLET | Refills: 0 | Status: SHIPPED | OUTPATIENT
Start: 2022-03-30 | End: 2023-06-30

## 2022-03-30 RX ORDER — CEFDINIR 300 MG/1
300 CAPSULE ORAL 2 TIMES DAILY
Qty: 14 CAPSULE | Refills: 0 | Status: SHIPPED | OUTPATIENT
Start: 2022-03-30 | End: 2022-04-06

## 2022-03-30 RX ADMIN — CEFTRIAXONE SODIUM 500 MG: 1 INJECTION, POWDER, FOR SOLUTION INTRAMUSCULAR; INTRAVENOUS at 10:03

## 2022-03-30 NOTE — PROGRESS NOTES
Subjective:      Ivy Salgado is a 41 y.o. female who returns today regarding her recent pyelonephritis.     The patient has a history of Crohn's disease s/p colectomy in 2012. 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 She was seen by Dr. Ray in 2013 for same issue (recurrent UTIs) and had normal cysto at that time. Previously on antibiotic prophylaxis for her recurrent UTIs. Also with a history of nephrolithiasis. Most recently she is s/p ureteroscopy for stone in 2020 with Dr. Florentino.     She presented to the ED on 3/23 with severe right flank pain, nausea, dysuria and fever. Admitted for pyelonephritis. Urine culture was positive for klebsiella. CT stone study showed no obstructive uropathy. She received rocephin IV and was discharged on cefdinir.    Today she continues to report fatigue and right flank pain. Denies dysuria, frequency, urgency, and fever. She believes intercourse has been her trigger for infections. Mild vaginal dryness. She remains on cefdinir and received a 1g of rocephin earlier today in Dr. Astorga's office.  Taking nsaids per pain but reports stomach pain with this.    The following portions of the patient's history were reviewed and updated as appropriate: allergies, current medications, past family history, past medical history, past social history, past surgical history and problem list.    Review of Systems  Constitutional: no fever or chills  ENT: no nasal congestion or sore throat  Respiratory: no cough or shortness of breath  Cardiovascular: no chest pain or palpitations  Gastrointestinal: no nausea or vomiting, tolerating diet  Genitourinary: as per HPI  Hematologic/Lymphatic: no easy bruising or lymphadenopathy  Musculoskeletal: no arthralgias or myalgias  Neurological: no seizures or tremors  Behavioral/Psych: no auditory or visual hallucinations     Objective:   Vital Signs:BP (!) 143/66 (BP Location: Right arm, Patient Position:  "Sitting, BP Method: Large (Automatic))   Pulse 62   LMP 03/30/2015     Physical Exam   General: alert and oriented, no acute distress  Head: normocephalic, atraumatic  Neck: supple, normal ROM  Respiratory: Symmetric expansion, non-labored breathing  Cardiovascular: regular rate and rhythm  Abdomen: soft, non tender, non distended  Pelvic: deferred  Skin: normal coloration and turgor, no rashes, no suspicious skin lesions noted  Neuro: alert and oriented x3, no gross deficits  Psych: normal judgment and insight, normal mood/affect and non-anxious  + CVA tenderness, right     Lab Review   Urinalysis demonstrates negative for all components  Lab Results   Component Value Date    WBC 7.75 03/24/2022    HGB 12.1 03/24/2022    HCT 36.9 (L) 03/24/2022    MCV 93 03/24/2022     03/24/2022     Lab Results   Component Value Date    CREATININE 0.6 03/24/2022    BUN 6 03/24/2022       Imaging   (all images personally reviewed; agree with report below)  CT stone study- "1. No evidence of obstructive uropathy.  Small bilateral nonobstructing nephrolithiasis."    Assessment:   Flank pain  Pyelonephritis  Nephrolithiasis    Plan:   Ivy was seen today for flank pain.    Diagnoses and all orders for this visit:    Flank pain  -     POCT URINE DIPSTICK WITHOUT MICROSCOPE  -     cefdinir (OMNICEF) 300 MG capsule; Take 1 capsule (300 mg total) by mouth 2 (two) times daily. for 7 days  -     Urine culture    Pyelonephritis  -     HYDROcodone-acetaminophen (NORCO) 5-325 mg per tablet; Take 1 tablet by mouth every 6 (six) hours as needed for Pain.    Nephrolithiasis    Plan:  --Continue cefdinir for a full 14 day course   --Repeat culture today  --Explained that her fatigue and flank pain may take several weeks to fully resolve  --Discussed postcoital and antibiotic prophylaxis, defer for now  --Begin replens for vaginal dryness   --She will message with symptom update     "

## 2022-03-30 NOTE — PROGRESS NOTES
"Per order, patient to receive Ceftriaxone 1000mg IM. Prior to administration, the patient's allergies were reviewed. The area of injection was identified in the upper outer quadrant of the Right and Left  buttock. This area was cleaned with alcohol. Using a 25g 1" safety needle, 1 mL (to each hip) of a solution containing ceftriaxone 1000 mg reconstituted with 2.1 mL of lidocaine 1% (mixed prior to administration) was placed into the muscle. The injection site was dressed with a bandage. Patient experienced no complications and was discharged in stable condition. Patient was notified to apply ice if they experienced any discomfort at the injection site. Ceftriaxone 1000 mg Lot: [id number] Exp: December 2023, Lidocaine 1% Lot: [id number], Exp:10/2025      "

## 2022-03-31 ENCOUNTER — PATIENT MESSAGE (OUTPATIENT)
Dept: UROLOGY | Facility: CLINIC | Age: 42
End: 2022-03-31
Payer: MEDICARE

## 2022-03-31 LAB — BACTERIA UR CULT: NO GROWTH

## 2022-03-31 RX ORDER — FLUCONAZOLE 150 MG/1
150 TABLET ORAL
Qty: 3 TABLET | Refills: 0 | Status: SHIPPED | OUTPATIENT
Start: 2022-03-31 | End: 2022-06-03

## 2022-04-11 ENCOUNTER — TELEPHONE (OUTPATIENT)
Dept: GASTROENTEROLOGY | Facility: CLINIC | Age: 42
End: 2022-04-11
Payer: MEDICARE

## 2022-04-11 ENCOUNTER — PATIENT MESSAGE (OUTPATIENT)
Dept: OBSTETRICS AND GYNECOLOGY | Facility: CLINIC | Age: 42
End: 2022-04-11
Payer: MEDICARE

## 2022-04-11 ENCOUNTER — PATIENT MESSAGE (OUTPATIENT)
Dept: DERMATOLOGY | Facility: CLINIC | Age: 42
End: 2022-04-11
Payer: MEDICARE

## 2022-04-11 ENCOUNTER — PATIENT MESSAGE (OUTPATIENT)
Dept: GASTROENTEROLOGY | Facility: CLINIC | Age: 42
End: 2022-04-11
Payer: MEDICARE

## 2022-04-11 ENCOUNTER — PATIENT MESSAGE (OUTPATIENT)
Dept: INTERNAL MEDICINE | Facility: CLINIC | Age: 42
End: 2022-04-11
Payer: MEDICARE

## 2022-04-11 NOTE — TELEPHONE ENCOUNTER
Dronabinol Capsule has been denied on appeal by [employer].  Dr.James Ross aware.  His recommendation is for her to schedule an office visit to see him.  Will reach out to the patient.   Oralia

## 2022-04-15 ENCOUNTER — PATIENT MESSAGE (OUTPATIENT)
Dept: WOUND CARE | Facility: CLINIC | Age: 42
End: 2022-04-15
Payer: MEDICARE

## 2022-04-18 ENCOUNTER — PATIENT MESSAGE (OUTPATIENT)
Dept: GASTROENTEROLOGY | Facility: CLINIC | Age: 42
End: 2022-04-18
Payer: MEDICARE

## 2022-04-21 ENCOUNTER — LAB VISIT (OUTPATIENT)
Dept: LAB | Facility: HOSPITAL | Age: 42
End: 2022-04-21
Attending: STUDENT IN AN ORGANIZED HEALTH CARE EDUCATION/TRAINING PROGRAM
Payer: MEDICARE

## 2022-04-21 ENCOUNTER — PATIENT MESSAGE (OUTPATIENT)
Dept: UROLOGY | Facility: CLINIC | Age: 42
End: 2022-04-21
Payer: MEDICARE

## 2022-04-21 DIAGNOSIS — R39.9 UTI SYMPTOMS: Primary | ICD-10-CM

## 2022-04-21 DIAGNOSIS — N12 PYELONEPHRITIS: ICD-10-CM

## 2022-04-21 DIAGNOSIS — R39.9 UTI SYMPTOMS: ICD-10-CM

## 2022-04-21 LAB
BILIRUB UR QL STRIP: NEGATIVE
CLARITY UR REFRACT.AUTO: CLEAR
COLOR UR AUTO: YELLOW
GLUCOSE UR QL STRIP: NEGATIVE
HGB UR QL STRIP: NEGATIVE
KETONES UR QL STRIP: NEGATIVE
LEUKOCYTE ESTERASE UR QL STRIP: NEGATIVE
NITRITE UR QL STRIP: NEGATIVE
PH UR STRIP: 5 [PH] (ref 5–8)
PROT UR QL STRIP: NEGATIVE
SP GR UR STRIP: 1.02 (ref 1–1.03)
URN SPEC COLLECT METH UR: NORMAL

## 2022-04-21 PROCEDURE — 87086 URINE CULTURE/COLONY COUNT: CPT | Performed by: STUDENT IN AN ORGANIZED HEALTH CARE EDUCATION/TRAINING PROGRAM

## 2022-04-21 PROCEDURE — 81003 URINALYSIS AUTO W/O SCOPE: CPT | Performed by: STUDENT IN AN ORGANIZED HEALTH CARE EDUCATION/TRAINING PROGRAM

## 2022-04-22 ENCOUNTER — CLINICAL SUPPORT (OUTPATIENT)
Dept: OBSTETRICS AND GYNECOLOGY | Facility: CLINIC | Age: 42
End: 2022-04-22
Payer: MEDICARE

## 2022-04-22 DIAGNOSIS — Z79.890 HORMONE REPLACEMENT THERAPY (HRT): Primary | ICD-10-CM

## 2022-04-22 PROCEDURE — 96372 THER/PROPH/DIAG INJ SC/IM: CPT | Mod: PBBFAC,PN

## 2022-04-22 RX ADMIN — ESTRADIOL VALERATE 20 MG: 20 INJECTION INTRAMUSCULAR at 01:04

## 2022-04-22 NOTE — PROGRESS NOTES
Pt arrived for her estradiol injection, voice no complaints, allergies verified. Pt tolerated injection well, remained in lobby with no adverse reactions noted. Given dates to return for next injection. Verbalized understanding.

## 2022-04-23 LAB — BACTERIA UR CULT: NO GROWTH

## 2022-05-02 DIAGNOSIS — F41.1 GENERALIZED ANXIETY DISORDER: ICD-10-CM

## 2022-05-03 ENCOUNTER — OUTPATIENT CASE MANAGEMENT (OUTPATIENT)
Dept: ADMINISTRATIVE | Facility: OTHER | Age: 42
End: 2022-05-03
Payer: MEDICARE

## 2022-05-03 RX ORDER — CLONAZEPAM 1 MG/1
TABLET ORAL
Qty: 75 TABLET | Refills: 0 | Status: SHIPPED | OUTPATIENT
Start: 2022-05-03 | End: 2022-06-03

## 2022-05-03 NOTE — TELEPHONE ENCOUNTER
No new care gaps identified.  Powered by [employer] plugin. Reference number: [id number].   5/02/2022 9:51:47 PM CDT

## 2022-05-09 ENCOUNTER — PATIENT MESSAGE (OUTPATIENT)
Dept: OBSTETRICS AND GYNECOLOGY | Facility: CLINIC | Age: 42
End: 2022-05-09
Payer: MEDICARE

## 2022-05-11 ENCOUNTER — PATIENT OUTREACH (OUTPATIENT)
Dept: ADMINISTRATIVE | Facility: OTHER | Age: 42
End: 2022-05-11
Payer: MEDICARE

## 2022-05-11 ENCOUNTER — PES CALL (OUTPATIENT)
Dept: ADMINISTRATIVE | Facility: CLINIC | Age: 42
End: 2022-05-11
Payer: MEDICARE

## 2022-05-12 ENCOUNTER — OFFICE VISIT (OUTPATIENT)
Dept: DERMATOLOGY | Facility: CLINIC | Age: 42
End: 2022-05-12
Payer: MEDICARE

## 2022-05-12 DIAGNOSIS — D22.5 MULTIPLE BENIGN MELANOCYTIC NEVI OF UPPER EXTREMITY, LOWER EXTREMITY, AND TRUNK: ICD-10-CM

## 2022-05-12 DIAGNOSIS — L81.4 LENTIGINES: ICD-10-CM

## 2022-05-12 DIAGNOSIS — D22.4 MELANOCYTIC NEVUS OF SCALP: Primary | ICD-10-CM

## 2022-05-12 DIAGNOSIS — D22.9 ATYPICAL NEVUS: ICD-10-CM

## 2022-05-12 DIAGNOSIS — D22.70 MULTIPLE BENIGN MELANOCYTIC NEVI OF UPPER EXTREMITY, LOWER EXTREMITY, AND TRUNK: ICD-10-CM

## 2022-05-12 DIAGNOSIS — Z86.018 HISTORY OF DYSPLASTIC NEVUS: ICD-10-CM

## 2022-05-12 DIAGNOSIS — D22.60 MULTIPLE BENIGN MELANOCYTIC NEVI OF UPPER EXTREMITY, LOWER EXTREMITY, AND TRUNK: ICD-10-CM

## 2022-05-12 DIAGNOSIS — F41.9 ANXIETY: ICD-10-CM

## 2022-05-12 PROCEDURE — 99213 OFFICE O/P EST LOW 20 MIN: CPT | Mod: S$GLB,,, | Performed by: DERMATOLOGY

## 2022-05-12 PROCEDURE — 99213 PR OFFICE/OUTPT VISIT, EST, LEVL III, 20-29 MIN: ICD-10-PCS | Mod: S$GLB,,, | Performed by: DERMATOLOGY

## 2022-05-12 NOTE — TELEPHONE ENCOUNTER
No new care gaps identified.  [facility] Embedded Care Gaps. Reference number: [id number]. 5/12/2022   4:09:23 PM CDT  
Benefits, risks, and possible complications of procedure explained to patient/caregiver who verbalized understanding and gave verbal consent.

## 2022-05-12 NOTE — PATIENT INSTRUCTIONS
Your prescription has been sent to the compounding [facility]  They are located in [place] at [address]. just before the [address].  If you have any questions, please call the pharmacy at [phone].  Website: [email or web address]

## 2022-05-12 NOTE — PROGRESS NOTES
Patient Information  Name: Ivy Salgado  : 1980  MRN: [id number]     Referring Physician:  No ref. provider found   Primary Care Physician:  Kalia Astorga MD   Date of Visit: 2022      Subjective:     History of Present lllness:    Ivy Salgado is a 41 y.o. female who presents with a chief complaint of moles.   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    Location: lower abdomen  Duration: months  Signs/Symptoms: on top of  scar  Relieving factors/Prior treatments: none    Patient was last seen: 2/3/2022.  Prior notes by myself reviewed.   Clinical documentation obtained by nursing staff reviewed.    Review of Systems   Constitutional: Negative for fever, chills, weight loss, fatigue, night sweats and malaise.   HENT: Negative for headaches.    Respiratory: Negative for cough and shortness of breath.    Gastrointestinal: Negative for indigestion.   Skin: Positive for activity-related sunscreen use. Negative for daily sunscreen use, recent sunburn and wears hat.   Neurological: Negative for headaches.   Hematologic/Lymphatic: Negative for adenopathy. Does not bruise/bleed easily.       Objective:   Physical Exam   Constitutional: She appears well-developed and well-nourished. No distress.   HENT:   Mouth/Throat: Lips normal.    Eyes: Lids are normal.  No conjunctival no injection.   Lymphadenopathy: No supraclavicular adenopathy is present.     She has no cervical adenopathy.     She has no axillary adenopathy.     She has no inguinal adenopathy.   Neurological: She is alert and oriented to person, place, and time. She is not disoriented.   Psychiatric: She has a normal mood and affect.   Skin:   Areas Examined (abnormalities noted in diagram):   Scalp / Hair Palpated and Inspected  Head / Face Inspection Performed  Neck  Inspection Performed  Chest / Axilla Inspection Performed  Abdomen Inspection Performed  Genitals / Buttocks / Groin Inspection Performed  Back Inspection Performed  RUE Inspected  LUE Inspection Performed  RLE Inspected  LLE Inspection Performed  Nails and Digits Inspection Performed  Gland Inspection Performed                     Diagram Legend     Erythematous scaling macule/papule c/w actinic keratosis       Vascular papule c/w angioma      Pigmented verrucoid papule/plaque c/w seborrheic keratosis      Yellow umbilicated papule c/w sebaceous hyperplasia      Irregularly shaped tan macule c/w lentigo     1-2 mm smooth white papules consistent with Milia      Movable subcutaneous cyst with punctum c/w epidermal inclusion cyst      Subcutaneous movable cyst c/w pilar cyst      Firm pink to brown papule c/w dermatofibroma      Pedunculated fleshy papule(s) c/w skin tag(s)      Evenly pigmented macule c/w junctional nevus     Mildly variegated pigmented, slightly irregular-bordered macule c/w mildly atypical nevus      Flesh colored to evenly pigmented papule c/w intradermal nevus       Pink pearly papule/plaque c/w basal cell carcinoma      Erythematous hyperkeratotic cursted plaque c/w SCC      Surgical scar with no sign of skin cancer recurrence      Open and closed comedones      Inflammatory papules and pustules      Verrucoid papule consistent consistent with wart     Erythematous eczematous patches and plaques     Dystrophic onycholytic nail with subungual debris c/w onychomycosis     Umbilicated papule    Erythematous-base heme-crusted tan verrucoid plaque consistent with inflamed seborrheic keratosis     Erythematous Silvery Scaling Plaque c/w Psoriasis     See annotation    No images are attached to the encounter or orders placed in the encounter.      [] Data reviewed  [] Prior external notes reviewed  [] Independent review of test  [] Management discussed with another provider  [] Independent  historian    Assessment / Plan:        Melanocytic nevus of scalp  Multiple benign melanocytic nevi of upper extremity, lower extremity, and trunk  Multiple benign-appearing nevi present on exam today. Reassurance provided. Counseled patient to periodically examine moles and return to clinic if any changes in size, shape, or color are noted or if it becomes symptomatic (bleeding, itching, pain, etc).  Recommend using a broad-spectrum, water-resistant sunscreen with SPF of 30 or higher--reapply every 2 hours. Seek shade, wear sun-protective clothing, and perform regular skin self-exams.    Lentigines  These are benign sun spots which should be monitored for changes. Daily sun protection will reduce the number of new lesions.   Recommend using a broad-spectrum, water-resistant sunscreen with SPF of 30 or higher--reapply every 2 hours. Seek shade, wear sun-protective clothing, and perform regular skin self-exams.    Atypical nevus   - stable and chronic  Mildly atypical-appearing nevus on exam today. Discussed need for biopsy/removal if any changes in size, shape, or color are noted or if it becomes symptomatic (bleeding, itching, pain, etc).    History of dysplastic nevus, severe atypia (early evolving melanoma in situ)   - stable and chronic  Area(s) of previous dysplastic nevus evaluated with no evidence of recurrence. Reassurance provided.    Other orders  -     benzocaine 20 % Oint; Compound benzocaine 20% / lidocaine 6% / tetracaine 4% ointment. Apply to affected area 1 hour prior to procedure.  Dispense: 30 g; Refill: 0    Follow up in about 1 year (around 5/12/2023) for or sooner if any new problems or changing lesions.      Iris Simon MD, FAAD  [facility]

## 2022-05-13 RX ORDER — ZOLPIDEM TARTRATE 10 MG/1
TABLET ORAL
Qty: 30 TABLET | Refills: 0 | Status: SHIPPED | OUTPATIENT
Start: 2022-05-13 | End: 2022-06-14

## 2022-05-23 ENCOUNTER — PATIENT MESSAGE (OUTPATIENT)
Dept: DERMATOLOGY | Facility: CLINIC | Age: 42
End: 2022-05-23
Payer: MEDICARE

## 2022-05-23 ENCOUNTER — PATIENT MESSAGE (OUTPATIENT)
Dept: UROLOGY | Facility: CLINIC | Age: 42
End: 2022-05-23
Payer: MEDICARE

## 2022-05-23 ENCOUNTER — PATIENT MESSAGE (OUTPATIENT)
Dept: INTERNAL MEDICINE | Facility: CLINIC | Age: 42
End: 2022-05-23
Payer: MEDICARE

## 2022-05-23 ENCOUNTER — TELEPHONE (OUTPATIENT)
Dept: UROLOGY | Facility: CLINIC | Age: 42
End: 2022-05-23
Payer: MEDICARE

## 2022-05-23 ENCOUNTER — LAB VISIT (OUTPATIENT)
Dept: LAB | Facility: OTHER | Age: 42
End: 2022-05-23
Attending: NURSE PRACTITIONER
Payer: MEDICARE

## 2022-05-23 DIAGNOSIS — R39.9 UTI SYMPTOMS: ICD-10-CM

## 2022-05-23 DIAGNOSIS — R39.9 UTI SYMPTOMS: Primary | ICD-10-CM

## 2022-05-23 PROCEDURE — 87088 URINE BACTERIA CULTURE: CPT | Performed by: NURSE PRACTITIONER

## 2022-05-23 PROCEDURE — 87186 SC STD MICRODIL/AGAR DIL: CPT | Performed by: NURSE PRACTITIONER

## 2022-05-23 PROCEDURE — 87086 URINE CULTURE/COLONY COUNT: CPT | Performed by: NURSE PRACTITIONER

## 2022-05-23 PROCEDURE — 87077 CULTURE AEROBIC IDENTIFY: CPT | Performed by: NURSE PRACTITIONER

## 2022-05-24 ENCOUNTER — PATIENT MESSAGE (OUTPATIENT)
Dept: INTERNAL MEDICINE | Facility: CLINIC | Age: 42
End: 2022-05-24
Payer: MEDICARE

## 2022-05-24 ENCOUNTER — PATIENT MESSAGE (OUTPATIENT)
Dept: GASTROENTEROLOGY | Facility: CLINIC | Age: 42
End: 2022-05-24
Payer: MEDICARE

## 2022-05-24 DIAGNOSIS — N30.01 ACUTE CYSTITIS WITH HEMATURIA: Primary | ICD-10-CM

## 2022-05-24 RX ORDER — CEFDINIR 300 MG/1
300 CAPSULE ORAL 2 TIMES DAILY
Qty: 20 CAPSULE | Refills: 0 | Status: SHIPPED | OUTPATIENT
Start: 2022-05-24 | End: 2022-06-03

## 2022-05-24 NOTE — TELEPHONE ENCOUNTER
Called to discuss with patient.   History of pyelonephritis in MAR2022.  Had follow up with urology  Recent stone study MAR2022 without evidence of obstruction  Began with hematuria and cloudy urine yesterday  Had urine culture performed with urology, but pending culture data  Did not order UA at the time.  Patient endorses starting today with fever and flank pain.  Patient planning to go to ED tonight for IVF and antibiotics.  Discussed with concern for pyelonephritis would recommend dose of IV/IM antibiotics and then can start on orals.   Patient in [place], unable to get to clinic in time.   Advised will send Cefdinir x10 days in case doesn't go to ED tonight.  Standing order for UA with reflex placed.   Patient agreeable with plan.

## 2022-05-25 ENCOUNTER — PATIENT MESSAGE (OUTPATIENT)
Dept: INTERNAL MEDICINE | Facility: CLINIC | Age: 42
End: 2022-05-25
Payer: MEDICARE

## 2022-05-25 ENCOUNTER — PROCEDURE VISIT (OUTPATIENT)
Dept: DERMATOLOGY | Facility: CLINIC | Age: 42
End: 2022-05-25
Payer: MEDICARE

## 2022-05-25 ENCOUNTER — TELEPHONE (OUTPATIENT)
Dept: UROLOGY | Facility: CLINIC | Age: 42
End: 2022-05-25
Payer: MEDICARE

## 2022-05-25 DIAGNOSIS — Z41.1 ELECTIVE PROCEDURE FOR UNACCEPTABLE COSMETIC APPEARANCE: Primary | ICD-10-CM

## 2022-05-25 PROCEDURE — 11954 SUBQ NJX FIL MATRL>10.0 CC: CPT | Mod: CSM,S$GLB,, | Performed by: DERMATOLOGY

## 2022-05-25 PROCEDURE — 99499 UNLISTED E&M SERVICE: CPT | Mod: CSM,S$GLB,, | Performed by: DERMATOLOGY

## 2022-05-25 PROCEDURE — 11954 PR JUVEDERM ULTRA: ICD-10-PCS | Mod: CSM,S$GLB,, | Performed by: DERMATOLOGY

## 2022-05-25 PROCEDURE — 99499 NO LOS: ICD-10-PCS | Mod: CSM,S$GLB,, | Performed by: DERMATOLOGY

## 2022-05-25 NOTE — PROGRESS NOTES
Pt presents today for cosmetic treatment of wrinkles and folds on the face with hyaluronic .    Discussed risks, benefits, and side effects of hyaluronic acid injections, including bruising, bleeding, redness, swelling, allergic reaction, pain, tenderness at injection site, infection, immune rejection of filler. Verbal and written consent were obtained from the patient.     A total of 1 mL of Juvederm Ultra XC was injected into the bilateral nasolabial folds, R anteromedial cheek, L mentolabial sulcus, B oral commissures, and upper vermillion lip.     There were no complications, and the patient tolerated the procedure well. Post-injection instructions were provided to the patient.    Juvederm Ultra XC  Lot #: [id number]  Exp date: 2022-10-26

## 2022-05-25 NOTE — TELEPHONE ENCOUNTER
Spoke to the patient in reference to her urine culture  Patient verbally understands to continue medication she in on right now until urine culture comes back        abdoul chapman

## 2022-05-26 ENCOUNTER — INFUSION (OUTPATIENT)
Dept: INFECTIOUS DISEASES | Facility: HOSPITAL | Age: 42
End: 2022-05-26
Attending: INTERNAL MEDICINE
Payer: MEDICARE

## 2022-05-26 VITALS
WEIGHT: 106.69 LBS | DIASTOLIC BLOOD PRESSURE: 80 MMHG | SYSTOLIC BLOOD PRESSURE: 134 MMHG | HEART RATE: 61 BPM | TEMPERATURE: 97 F | OXYGEN SATURATION: 98 % | BODY MASS INDEX: 20.17 KG/M2

## 2022-05-26 DIAGNOSIS — K50.00 CROHN'S DISEASE OF SMALL INTESTINE WITHOUT COMPLICATION: Primary | ICD-10-CM

## 2022-05-26 LAB — BACTERIA UR CULT: ABNORMAL

## 2022-05-26 PROCEDURE — 63600175 PHARM REV CODE 636 W HCPCS: Performed by: INTERNAL MEDICINE

## 2022-05-26 PROCEDURE — 25000003 PHARM REV CODE 250: Performed by: INTERNAL MEDICINE

## 2022-05-26 PROCEDURE — 96360 HYDRATION IV INFUSION INIT: CPT

## 2022-05-26 RX ORDER — ACETAMINOPHEN 325 MG/1
650 TABLET ORAL
Status: CANCELLED | OUTPATIENT
Start: 2022-06-09

## 2022-05-26 RX ORDER — DIPHENHYDRAMINE HYDROCHLORIDE 50 MG/ML
25 INJECTION INTRAMUSCULAR; INTRAVENOUS
Status: CANCELLED | OUTPATIENT
Start: 2022-06-09

## 2022-05-26 RX ORDER — SODIUM CHLORIDE 0.9 % (FLUSH) 0.9 %
10 SYRINGE (ML) INJECTION
Status: DISCONTINUED | OUTPATIENT
Start: 2022-05-26 | End: 2022-05-26 | Stop reason: HOSPADM

## 2022-05-26 RX ORDER — HEPARIN 100 UNIT/ML
500 SYRINGE INTRAVENOUS
Status: DISCONTINUED | OUTPATIENT
Start: 2022-05-26 | End: 2022-05-26 | Stop reason: HOSPADM

## 2022-05-26 RX ORDER — HEPARIN 100 UNIT/ML
500 SYRINGE INTRAVENOUS
Status: CANCELLED | OUTPATIENT
Start: 2022-06-09

## 2022-05-26 RX ORDER — SODIUM CHLORIDE 0.9 % (FLUSH) 0.9 %
10 SYRINGE (ML) INJECTION
Status: CANCELLED | OUTPATIENT
Start: 2022-06-09

## 2022-05-26 RX ORDER — EPINEPHRINE 1 MG/ML
0.3 INJECTION, SOLUTION, CONCENTRATE INTRAVENOUS
Status: CANCELLED | OUTPATIENT
Start: 2022-06-09

## 2022-05-26 RX ORDER — METHYLPREDNISOLONE SOD SUCC 125 MG
40 VIAL (EA) INJECTION
Status: CANCELLED | OUTPATIENT
Start: 2022-06-09

## 2022-05-26 RX ORDER — IPRATROPIUM BROMIDE AND ALBUTEROL SULFATE 2.5; .5 MG/3ML; MG/3ML
3 SOLUTION RESPIRATORY (INHALATION)
Status: CANCELLED | OUTPATIENT
Start: 2022-06-09

## 2022-05-26 RX ADMIN — SODIUM CHLORIDE 1000 ML: 0.9 INJECTION, SOLUTION INTRAVENOUS at 01:05

## 2022-05-26 RX ADMIN — HEPARIN 500 UNITS: 100 SYRINGE at 02:05

## 2022-05-26 NOTE — PROGRESS NOTES
Patient arrived for IV NS bolus.  Patient tolerated  Infusion well and left in NAD.

## 2022-05-30 ENCOUNTER — PATIENT MESSAGE (OUTPATIENT)
Dept: OBSTETRICS AND GYNECOLOGY | Facility: CLINIC | Age: 42
End: 2022-05-30
Payer: MEDICARE

## 2022-06-02 DIAGNOSIS — F41.1 GENERALIZED ANXIETY DISORDER: ICD-10-CM

## 2022-06-03 ENCOUNTER — CLINICAL SUPPORT (OUTPATIENT)
Dept: OBSTETRICS AND GYNECOLOGY | Facility: CLINIC | Age: 42
End: 2022-06-03
Payer: MEDICARE

## 2022-06-03 DIAGNOSIS — Z79.890 HORMONE REPLACEMENT THERAPY (HRT): ICD-10-CM

## 2022-06-03 DIAGNOSIS — N95.1 MENOPAUSAL SYMPTOMS: Primary | ICD-10-CM

## 2022-06-03 PROCEDURE — 96372 THER/PROPH/DIAG INJ SC/IM: CPT | Mod: PBBFAC,PN

## 2022-06-03 RX ORDER — CLONAZEPAM 1 MG/1
TABLET ORAL
Qty: 75 TABLET | Refills: 0 | Status: SHIPPED | OUTPATIENT
Start: 2022-06-03 | End: 2022-07-05

## 2022-06-03 RX ORDER — ESTRADIOL VALERATE 40 MG/ML
20 INJECTION INTRAMUSCULAR
Status: DISCONTINUED | OUTPATIENT
Start: 2022-06-03 | End: 2024-01-26 | Stop reason: HOSPADM

## 2022-06-03 RX ADMIN — ESTRADIOL VALERATE 20 MG: 40 INJECTION INTRAMUSCULAR at 11:06

## 2022-06-03 NOTE — TELEPHONE ENCOUNTER
No new care gaps identified.  [facility] Embedded Care Gaps. Reference number: [id number]. 6/02/2022   10:53:17 PM CDT

## 2022-06-11 ENCOUNTER — PATIENT MESSAGE (OUTPATIENT)
Dept: DERMATOLOGY | Facility: CLINIC | Age: 42
End: 2022-06-11
Payer: MEDICARE

## 2022-06-14 ENCOUNTER — PATIENT MESSAGE (OUTPATIENT)
Dept: ELECTROPHYSIOLOGY | Facility: CLINIC | Age: 42
End: 2022-06-14
Payer: MEDICARE

## 2022-06-14 ENCOUNTER — PATIENT MESSAGE (OUTPATIENT)
Dept: INTERNAL MEDICINE | Facility: CLINIC | Age: 42
End: 2022-06-14
Payer: MEDICARE

## 2022-06-14 ENCOUNTER — PATIENT MESSAGE (OUTPATIENT)
Dept: OBSTETRICS AND GYNECOLOGY | Facility: CLINIC | Age: 42
End: 2022-06-14
Payer: MEDICARE

## 2022-06-19 ENCOUNTER — PATIENT MESSAGE (OUTPATIENT)
Dept: INTERNAL MEDICINE | Facility: CLINIC | Age: 42
End: 2022-06-19
Payer: MEDICARE

## 2022-06-20 ENCOUNTER — HOSPITAL ENCOUNTER (OUTPATIENT)
Dept: CARDIOLOGY | Facility: CLINIC | Age: 42
Discharge: HOME | End: 2022-06-20
Payer: MEDICARE

## 2022-06-20 DIAGNOSIS — I49.8 OTHER SPECIFIED CARDIAC ARRHYTHMIAS: ICD-10-CM

## 2022-06-20 PROCEDURE — 93010 ELECTROCARDIOGRAM REPORT: CPT | Mod: S$PBB,,, | Performed by: INTERNAL MEDICINE

## 2022-06-20 PROCEDURE — 93010 RHYTHM STRIP: ICD-10-PCS | Mod: S$PBB,,, | Performed by: INTERNAL MEDICINE

## 2022-06-20 PROCEDURE — 93005 ELECTROCARDIOGRAM TRACING: CPT | Mod: PBBFAC | Performed by: INTERNAL MEDICINE

## 2022-06-22 NOTE — PROGRESS NOTES
Subjective:       Patient ID: Ivy Salgado is a 41 y.o. female.    Chief Complaint: Pre-op Exam    Pt here for preop for upcoming breast lift. She has tolerated anesthesia in the past without known major complications. She is active and able to achieve >4 METS (push mows and raking yard) without cp/sob/angina.     She has had issues with recurrent UTI in the past. Also with h/o ureteral stones with h/o stent and removal. She has f/u with urology.    Pt had L adnexal mass and saw GYN-onc for ex lap/BSO; mass was benign. 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 Per GYN, this will be followed but no further intervention was required.     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 She has regular f/u with derm.       She has anxiety which is fairly well controlled with current meds. She uses klonopin bid; we discussed continuing to cut back; currently getting 75 tablets per month. She was seeing psychiatry but still trying to est with new provider due to insurance change. No SI/HI.     Crohn's is fairly well controlled. She has some abd pain with nausea and occasional vomiting for which she has d/w GI but doesn't feel this is active crohns. No fever. She is on entyvio but has not been on this for 2 years as she was having recurrent UTIs; she plans on d/w GI at f/u next month. She sees Dr. Ross with GI regularly who manages her chronic opiates and is on pain contract. She has high output from her stoma which GI is managing but there are no good solutions to this.     GERD is well controlled with prn pepcid. No associated red flag symptoms.    She has osteopenia on DEXA 2/2020. Reiterated importance of ca/d. We discussed other meds but she is not on prednisone now and osteopenia is mild so will hold off.     She has thyroid nodules that were small on last u/s 10/2020 and not meeting criteria  for biopsy. Clinically she is euthyroid. Repeat u/s can be done in 10/2021 and if 5 yrs stability (2024) can stop surveillance.     She has prolonged QTc prior EKG with neg genetic testing. Also with sinus tach which results in occasional palpitations. Saw cardiology and recommends staying on nadolol. This is stable.     Review of Systems   Constitutional: Positive for activity change. Negative for fatigue, fever and unexpected weight change.   HENT: Negative for ear pain, hearing loss, rhinorrhea, sore throat and trouble swallowing.    Eyes: Negative for discharge and visual disturbance.   Respiratory: Negative for cough, chest tightness, shortness of breath and wheezing.    Cardiovascular: Positive for palpitations. Negative for chest pain.   Gastrointestinal: Positive for diarrhea. Negative for abdominal pain, blood in stool, constipation, nausea and vomiting.   Endocrine: Negative for polydipsia and polyuria.   Genitourinary: Negative for difficulty urinating, dysuria, frequency, hematuria and menstrual problem.   Musculoskeletal: Negative for arthralgias, joint swelling and neck pain.   Neurological: Negative for weakness, numbness and headaches.   Psychiatric/Behavioral: Negative for confusion and dysphoric mood.       Objective:      Physical Exam  Constitutional:       Appearance: She is well-developed.   HENT:      Head: Normocephalic and atraumatic.      Right Ear: Tympanic membrane, ear canal and external ear normal.      Left Ear: Tympanic membrane, ear canal and external ear normal.      Nose: No mucosal edema or rhinorrhea.      Mouth/Throat:      Pharynx: No oropharyngeal exudate or posterior oropharyngeal erythema.   Eyes:      Pupils: Pupils are equal, round, and reactive to light.   Neck:      Thyroid: No thyroid mass or thyromegaly.      Vascular: No carotid bruit.   Cardiovascular:      Rate and Rhythm: Normal rate and regular rhythm.      Heart sounds: Normal heart sounds, S1 normal and S2  normal. No murmur heard.  Pulmonary:      Effort: Pulmonary effort is normal.      Breath sounds: Normal breath sounds. No wheezing.   Abdominal:      General: Bowel sounds are normal. There is no distension.      Palpations: Abdomen is soft. There is no mass.      Tenderness: There is no abdominal tenderness.   Musculoskeletal:      Cervical back: Neck supple.   Lymphadenopathy:      Cervical: No cervical adenopathy.   Neurological:      Mental Status: She is alert and oriented to person, place, and time.      Cranial Nerves: No cranial nerve deficit.   Psychiatric:         Judgment: Judgment normal.         Assessment:       1. Crohn's disease of small intestine with complication    2. Generalized anxiety disorder    3. Current mild episode of major depressive disorder without prior episode    4. Multiple thyroid nodules    5. Osteopenia of multiple sites    6. Chronic, continuous use of opioids    7. Gastroesophageal reflux disease without esophagitis    8. Complex partial epilepsy with generalization and with intractable epilepsy    9. History of recurrent UTI (urinary tract infection)    10. Seizures    11. Palpitations        Plan:       1. Keep f/u with specialists  2. Prior labs reviewed with pt  3. Pt is medically optimized to proceed with planned procedure; RCRI is 3.9% with 0 risk factors; chronic diseases are stable and well compensated; she has already d/w cardiology who recommended continuing nadolol uninterrupted and informing anesthesia of her h/o prolonged QT and to avoid any offending meds otherwise; she has been off entyvio and will remain off this through her surgery and post op period and will f/u with GI thereafter to see about restarting it

## 2022-06-23 ENCOUNTER — OFFICE VISIT (OUTPATIENT)
Dept: INTERNAL MEDICINE | Facility: CLINIC | Age: 42
End: 2022-06-23
Payer: MEDICARE

## 2022-06-23 VITALS
SYSTOLIC BLOOD PRESSURE: 112 MMHG | WEIGHT: 109.13 LBS | HEIGHT: 61 IN | OXYGEN SATURATION: 98 % | HEART RATE: 48 BPM | DIASTOLIC BLOOD PRESSURE: 82 MMHG | BODY MASS INDEX: 20.6 KG/M2

## 2022-06-23 DIAGNOSIS — R56.9 SEIZURES: ICD-10-CM

## 2022-06-23 DIAGNOSIS — E04.2 MULTIPLE THYROID NODULES: ICD-10-CM

## 2022-06-23 DIAGNOSIS — Z87.440 HISTORY OF RECURRENT UTI (URINARY TRACT INFECTION): ICD-10-CM

## 2022-06-23 DIAGNOSIS — G40.219 COMPLEX PARTIAL EPILEPSY WITH GENERALIZATION AND WITH INTRACTABLE EPILEPSY: ICD-10-CM

## 2022-06-23 DIAGNOSIS — K50.019 CROHN'S DISEASE OF SMALL INTESTINE WITH COMPLICATION: Primary | ICD-10-CM

## 2022-06-23 DIAGNOSIS — M85.89 OSTEOPENIA OF MULTIPLE SITES: ICD-10-CM

## 2022-06-23 DIAGNOSIS — F41.1 GENERALIZED ANXIETY DISORDER: ICD-10-CM

## 2022-06-23 DIAGNOSIS — F32.0 CURRENT MILD EPISODE OF MAJOR DEPRESSIVE DISORDER WITHOUT PRIOR EPISODE: ICD-10-CM

## 2022-06-23 DIAGNOSIS — K21.9 GASTROESOPHAGEAL REFLUX DISEASE WITHOUT ESOPHAGITIS: ICD-10-CM

## 2022-06-23 DIAGNOSIS — R00.2 PALPITATIONS: ICD-10-CM

## 2022-06-23 DIAGNOSIS — F11.90 CHRONIC, CONTINUOUS USE OF OPIOIDS: ICD-10-CM

## 2022-06-23 PROCEDURE — 99215 OFFICE O/P EST HI 40 MIN: CPT | Mod: PBBFAC | Performed by: INTERNAL MEDICINE

## 2022-06-23 PROCEDURE — 99999 PR PBB SHADOW E&M-EST. PATIENT-LVL V: ICD-10-PCS | Mod: PBBFAC,,, | Performed by: INTERNAL MEDICINE

## 2022-06-23 PROCEDURE — 99999 PR PBB SHADOW E&M-EST. PATIENT-LVL V: CPT | Mod: PBBFAC,,, | Performed by: INTERNAL MEDICINE

## 2022-06-23 PROCEDURE — 99214 OFFICE O/P EST MOD 30 MIN: CPT | Mod: S$PBB,,, | Performed by: INTERNAL MEDICINE

## 2022-06-23 PROCEDURE — 99214 PR OFFICE/OUTPT VISIT, EST, LEVL IV, 30-39 MIN: ICD-10-PCS | Mod: S$PBB,,, | Performed by: INTERNAL MEDICINE

## 2022-06-23 RX ORDER — CLINDAMYCIN HYDROCHLORIDE 300 MG/1
300 CAPSULE ORAL 3 TIMES DAILY
COMMUNITY
Start: 2022-06-22 | End: 2023-01-19 | Stop reason: CLARIF

## 2022-06-23 RX ORDER — IBUPROFEN 800 MG/1
800 TABLET ORAL 3 TIMES DAILY
COMMUNITY
Start: 2022-06-22 | End: 2023-02-02

## 2022-06-24 ENCOUNTER — HOSPITAL ENCOUNTER (OUTPATIENT)
Dept: RADIOLOGY | Facility: HOSPITAL | Age: 42
Discharge: HOME | End: 2022-06-24
Attending: SPECIALIST
Payer: MEDICARE

## 2022-06-24 DIAGNOSIS — Z12.31 VISIT FOR SCREENING MAMMOGRAM: ICD-10-CM

## 2022-06-24 PROCEDURE — 77067 SCR MAMMO BI INCL CAD: CPT | Mod: TC

## 2022-06-24 PROCEDURE — 77063 MAMMO DIGITAL SCREENING BILAT WITH TOMO: ICD-10-PCS | Mod: 26,,, | Performed by: RADIOLOGY

## 2022-06-24 PROCEDURE — 77067 SCR MAMMO BI INCL CAD: CPT | Mod: 26,,, | Performed by: RADIOLOGY

## 2022-06-24 PROCEDURE — 77063 BREAST TOMOSYNTHESIS BI: CPT | Mod: 26,,, | Performed by: RADIOLOGY

## 2022-06-24 PROCEDURE — 77067 MAMMO DIGITAL SCREENING BILAT WITH TOMO: ICD-10-PCS | Mod: 26,,, | Performed by: RADIOLOGY

## 2022-06-24 PROCEDURE — 77063 BREAST TOMOSYNTHESIS BI: CPT | Mod: TC

## 2022-07-07 ENCOUNTER — PATIENT MESSAGE (OUTPATIENT)
Dept: GASTROENTEROLOGY | Facility: CLINIC | Age: 42
End: 2022-07-07
Payer: MEDICARE

## 2022-07-07 ENCOUNTER — OFFICE VISIT (OUTPATIENT)
Dept: GASTROENTEROLOGY | Facility: CLINIC | Age: 42
End: 2022-07-07
Payer: MEDICARE

## 2022-07-07 ENCOUNTER — PATIENT MESSAGE (OUTPATIENT)
Dept: GASTROENTEROLOGY | Facility: CLINIC | Age: 42
End: 2022-07-07

## 2022-07-07 VITALS — WEIGHT: 110.5 LBS | HEIGHT: 61 IN | BODY MASS INDEX: 20.86 KG/M2

## 2022-07-07 DIAGNOSIS — K50.019 CROHN'S DISEASE OF SMALL INTESTINE WITH COMPLICATION: Primary | ICD-10-CM

## 2022-07-07 DIAGNOSIS — Z93.2 S/P ILEOSTOMY: Chronic | ICD-10-CM

## 2022-07-07 DIAGNOSIS — K21.9 GASTROESOPHAGEAL REFLUX DISEASE, UNSPECIFIED WHETHER ESOPHAGITIS PRESENT: ICD-10-CM

## 2022-07-07 PROCEDURE — 99999 PR PBB SHADOW E&M-EST. PATIENT-LVL IV: ICD-10-PCS | Mod: PBBFAC,,, | Performed by: INTERNAL MEDICINE

## 2022-07-07 PROCEDURE — 99214 PR OFFICE/OUTPT VISIT, EST, LEVL IV, 30-39 MIN: ICD-10-PCS | Mod: S$PBB,,, | Performed by: INTERNAL MEDICINE

## 2022-07-07 PROCEDURE — 99214 OFFICE O/P EST MOD 30 MIN: CPT | Mod: PBBFAC | Performed by: INTERNAL MEDICINE

## 2022-07-07 PROCEDURE — 99999 PR PBB SHADOW E&M-EST. PATIENT-LVL IV: CPT | Mod: PBBFAC,,, | Performed by: INTERNAL MEDICINE

## 2022-07-07 PROCEDURE — 99214 OFFICE O/P EST MOD 30 MIN: CPT | Mod: S$PBB,,, | Performed by: INTERNAL MEDICINE

## 2022-07-07 NOTE — PROGRESS NOTES
Subjective:       Patient ID: Ivy Salgado is a 42 y.o. female.    Chief Complaint: Crohn's Disease    HPI     Follow-up visit.  42-year-old lady with Crohn's disease since the age of 10.  Numerous complications and surgeries.  Complications have included entero vesicular fistula, abdominal abscess.  This ultimately led to her getting a total proctocolectomy and end ileostomy in 2012.    She was followed by Dr. Mil Zarco from 09/02/2000 1015 and then transferred to my care in 2015    Her treatment has been complicated over the years.  Initial treatment with Remicade was effective but she lost response.  Later she was tried on Humira and that cause lupus like side effects.  She also had side effects with methotrexate and azathioprine.  She tolerated Cimzia from I believe 2011 to 2017 fairly well.  She was tried on stelara from 2017 to 2019.  And then in 2019 I started Entyvio.  Entyvio last year or the reports complicated by palpitations after or with the infusions but also frequent urinary tract infections.  Should she has been off Entyvio for about a year.    I reviewed her current symptoms.  Just a week ago she had breast surgery.  So she is recovering from that and she tolerated the surgery well.  She did have some heartburn after that.  In general her heartburn is fairly well controlled with Protonix.  She is able to handle the output from her ileostomy.  Every once while she does have to come in for IV fluid supplementation.  She does have a port in place.  She does that maybe once a month.  She denies any abdominal pain.  She is using Imodium almost exclusively for the high output.  And in general that handles it fairly well.  She trying to cut back on the Lomotil.  She definitely suffers with nausea and poor appetite.  She has been on Marinol for that and that really does help.  Otherwise she does not have must interest in eating.  Currently she is able to eat small meals and maintain her weight.  I did  start her on gabapentin a number years ago for pain.  Now the main pain at that time was joint pain related to the Crohn's but it was something I wanted to avoid NSAIDs on.  And in general that has helped her with the pain.    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    Past Medical History:   Diagnosis Date    Abnormal Pap smear 2007    Abnormal Pap smear 5/26/2011    Anemia     Anxiety     Arthritis     C. difficile diarrhea     Crohn's disease     Depression 8/5/2017    Encounter for blood transfusion     Genital HSV     History of colposcopy with cervical biopsy 2007 and 7/2011 2007-LYLA I  and 7/2011- LYLA I    Hypertension     Kidney stone     Kidney stone     Melanoma     Recurrent UTI 4/3/2013    S/P ileostomy 7/9/2012    Sterilization 6/23/2012       Review of patient's allergies indicates:   Allergen Reactions    Azathioprine sodium Other (See Comments)     Other reaction(s): pancreatitis  Other reaction(s): pancreatitis    Methotrexate analogues Other (See Comments)     leukopenia    Stelara [ustekinumab] Other (See Comments)     Multiple infections    Zofran [ondansetron hcl (pf)] Other (See Comments)     Per patient causes prolong QT    Vancomycin analogues Hives    Morphine Itching and Other (See Comments)     Other reaction(s): Itching    Bactrim [sulfamethoxazole-trimethoprim] Palpitations    Ciprofloxacin Palpitations        Family History   Problem Relation Age of Onset    Colon cancer Father     Cancer Father         Colon    Liver cancer Father     Hyperlipidemia Father     Hypertension Mother     Cancer Maternal Grandfather         Skin    Skin cancer Maternal Grandfather     Diabetes Maternal Grandfather     Heart disease Maternal Grandfather     Endometrial cancer Maternal Aunt     Crohn's disease Brother     Diabetes Paternal Grandfather      Hearing loss Paternal Grandmother     Breast cancer Neg Hx     Ovarian cancer Neg Hx     Melanoma Neg Hx        Social History     Tobacco Use    Smoking status: Never Smoker    Smokeless tobacco: Never Used   Substance Use Topics    Alcohol use: Not Currently     Alcohol/week: 0.0 standard drinks    Drug use: No        Review of Systems   Genitourinary: Positive for dysuria.   Musculoskeletal: Positive for arthralgias.       CMP   Lab Results   Component Value Date     03/24/2022     03/23/2022     03/17/2022    K 3.8 03/24/2022    K 3.8 03/23/2022    K 4.3 03/17/2022     03/24/2022     03/23/2022     03/17/2022    CO2 23 03/24/2022    CO2 23 03/23/2022    CO2 26 03/17/2022    GLU 89 03/24/2022    GLU 98 03/23/2022    GLU 91 03/17/2022    BUN 6 03/24/2022    BUN 10 03/23/2022    BUN 8 03/17/2022    CREATININE 0.6 03/24/2022    CREATININE 0.8 03/23/2022    CREATININE 0.7 03/17/2022    CALCIUM 8.8 03/24/2022    CALCIUM 9.7 03/23/2022    CALCIUM 9.4 03/17/2022    PROT 7.8 03/23/2022    PROT 7.3 03/17/2022    ALBUMIN 3.7 03/23/2022    ALBUMIN 3.5 03/17/2022    BILITOT 0.6 03/23/2022    BILITOT 0.3 03/17/2022    ALKPHOS 74 03/23/2022    ALKPHOS 84 03/17/2022    AST 13 03/23/2022    AST 27 03/17/2022    ALT 8 (L) 03/23/2022    ALT 17 03/17/2022    ANIONGAP 8 03/24/2022    ANIONGAP 8 03/23/2022    ANIONGAP 8 03/17/2022    ESTGFRAFRICA >60.0 03/24/2022    ESTGFRAFRICA >60.0 03/23/2022    ESTGFRAFRICA >60.0 03/17/2022    EGFRNONAA >60.0 03/24/2022    EGFRNONAA >60.0 03/23/2022    EGFRNONAA >60.0 03/17/2022    and CBC   Lab Results   Component Value Date    WBC 7.75 03/24/2022    WBC 8.92 03/23/2022    WBC 6.41 03/17/2022    HGB 12.1 03/24/2022    HGB 13.6 03/23/2022    HGB 13.1 03/17/2022    HCT 36.9 (L) 03/24/2022     03/24/2022     03/23/2022     03/17/2022       No results found for this or any previous visit from the past 365 days.              Objective:      Physical Exam  Abdominal:      General: Abdomen is flat. Bowel sounds are normal. There is no distension. There are no signs of injury.      Palpations: Abdomen is soft.      Tenderness: There is no abdominal tenderness.             Assessment & Plan:       Crohn's disease of small intestine with complication    S/P ileostomy    Gastroesophageal reflux disease, unspecified whether esophagitis present     Assessment.  1. Crohn's disease.  Thirty-two year history of the disease.  Multiple complications.  She is off all treatment right at this time.  I do not have any evidence of active disease.  She is reluctant to restart Entyvio.  And I do not really want to restart anything into have documentation the disease is come back.  My plan would be to do an MR enterography later this year.  If there is recurrent disease.  Then I will ask her if she could transition to the IBD team, probably they will want to do an ileoscopy, and then it they can decide what will be the best treatment option considering her complicated history.  2. Status post ileostomy with high output.  Managing this fairly well right now.  3. Gastroesophageal reflux.  Again managed fairly well on Protonix  4. High risk medications.  This include gabapentin which I am prescribing for her joint pain and Marinol which I am prescribing for her nausea and anorexia.    Recommendation return in 6 months    25 minutes appointment greater half time face-to-face counseling  This note was created with voice recognition dictation technology.  There may be errors that I did not see, detect or correct.        Parish Ross MD

## 2022-07-19 ENCOUNTER — PES CALL (OUTPATIENT)
Dept: ADMINISTRATIVE | Facility: CLINIC | Age: 42
End: 2022-07-19
Payer: MEDICARE

## 2022-08-05 ENCOUNTER — PATIENT MESSAGE (OUTPATIENT)
Dept: OBSTETRICS AND GYNECOLOGY | Facility: CLINIC | Age: 42
End: 2022-08-05
Payer: MEDICARE

## 2022-08-11 ENCOUNTER — PATIENT MESSAGE (OUTPATIENT)
Dept: INTERNAL MEDICINE | Facility: CLINIC | Age: 42
End: 2022-08-11
Payer: MEDICAID

## 2022-08-14 ENCOUNTER — PATIENT MESSAGE (OUTPATIENT)
Dept: INTERNAL MEDICINE | Facility: CLINIC | Age: 42
End: 2022-08-14
Payer: MEDICAID

## 2022-08-15 ENCOUNTER — CLINICAL SUPPORT (OUTPATIENT)
Dept: OBSTETRICS AND GYNECOLOGY | Facility: CLINIC | Age: 42
End: 2022-08-15
Payer: MEDICAID

## 2022-08-15 PROCEDURE — 96372 THER/PROPH/DIAG INJ SC/IM: CPT | Mod: PBBFAC,PN

## 2022-08-15 PROCEDURE — 99999 PR PBB SHADOW E&M-EST. PATIENT-LVL I: CPT | Mod: PBBFAC,,,

## 2022-08-15 PROCEDURE — 99211 OFF/OP EST MAY X REQ PHY/QHP: CPT | Mod: PBBFAC,PN

## 2022-08-15 PROCEDURE — 99999 PR PBB SHADOW E&M-EST. PATIENT-LVL I: ICD-10-PCS | Mod: PBBFAC,,,

## 2022-08-15 RX ADMIN — ESTRADIOL VALERATE 20 MG: 40 INJECTION INTRAMUSCULAR at 01:08

## 2022-08-29 ENCOUNTER — PATIENT MESSAGE (OUTPATIENT)
Dept: OBSTETRICS AND GYNECOLOGY | Facility: CLINIC | Age: 42
End: 2022-08-29
Payer: MEDICAID

## 2022-08-29 RX ORDER — METRONIDAZOLE 250 MG/1
250 TABLET ORAL 3 TIMES DAILY
Qty: 21 TABLET | Refills: 0 | Status: SHIPPED | OUTPATIENT
Start: 2022-08-29 | End: 2022-09-05

## 2022-09-22 ENCOUNTER — PATIENT MESSAGE (OUTPATIENT)
Dept: OBSTETRICS AND GYNECOLOGY | Facility: CLINIC | Age: 42
End: 2022-09-22
Payer: MEDICAID

## 2022-09-27 ENCOUNTER — PATIENT MESSAGE (OUTPATIENT)
Dept: INTERNAL MEDICINE | Facility: CLINIC | Age: 42
End: 2022-09-27
Payer: MEDICAID

## 2022-09-27 ENCOUNTER — PATIENT MESSAGE (OUTPATIENT)
Dept: GASTROENTEROLOGY | Facility: CLINIC | Age: 42
End: 2022-09-27
Payer: MEDICAID

## 2022-09-27 ENCOUNTER — CLINICAL SUPPORT (OUTPATIENT)
Dept: OBSTETRICS AND GYNECOLOGY | Facility: CLINIC | Age: 42
End: 2022-09-27
Payer: MEDICAID

## 2022-09-27 DIAGNOSIS — N95.1 MENOPAUSAL SYMPTOMS: Primary | ICD-10-CM

## 2022-09-27 DIAGNOSIS — F41.9 ANXIETY: Primary | ICD-10-CM

## 2022-09-27 PROCEDURE — 96372 THER/PROPH/DIAG INJ SC/IM: CPT | Mod: PBBFAC,PN

## 2022-09-27 RX ADMIN — ESTRADIOL VALERATE 20 MG: 40 INJECTION INTRAMUSCULAR at 10:09

## 2022-09-27 NOTE — PROGRESS NOTES
Patient arrived on time for hormone replacement therapy. Pt voiced no concerns since last injection. Allergies reviewed, administered medication. Pt remained in clinic post injection 15 minutes with no adverse reaction noted.

## 2022-09-30 ENCOUNTER — PATIENT MESSAGE (OUTPATIENT)
Dept: OBSTETRICS AND GYNECOLOGY | Facility: CLINIC | Age: 42
End: 2022-09-30
Payer: MEDICAID

## 2022-09-30 ENCOUNTER — PATIENT MESSAGE (OUTPATIENT)
Dept: BEHAVIORAL HEALTH | Facility: OTHER | Age: 42
End: 2022-09-30
Payer: MEDICAID

## 2022-10-03 ENCOUNTER — OFFICE VISIT (OUTPATIENT)
Dept: OBSTETRICS AND GYNECOLOGY | Facility: CLINIC | Age: 42
End: 2022-10-03
Payer: COMMERCIAL

## 2022-10-03 ENCOUNTER — LAB VISIT (OUTPATIENT)
Dept: LAB | Facility: HOSPITAL | Age: 42
End: 2022-10-03
Attending: SPECIALIST
Payer: MEDICAID

## 2022-10-03 VITALS
BODY MASS INDEX: 21.02 KG/M2 | DIASTOLIC BLOOD PRESSURE: 86 MMHG | WEIGHT: 111.31 LBS | HEIGHT: 61 IN | SYSTOLIC BLOOD PRESSURE: 118 MMHG

## 2022-10-03 DIAGNOSIS — Z11.3 SCREEN FOR STD (SEXUALLY TRANSMITTED DISEASE): ICD-10-CM

## 2022-10-03 DIAGNOSIS — Z11.3 SCREEN FOR STD (SEXUALLY TRANSMITTED DISEASE): Primary | ICD-10-CM

## 2022-10-03 DIAGNOSIS — Z71.89 COMPLEX CARE COORDINATION: ICD-10-CM

## 2022-10-03 LAB
C TRACH DNA SPEC QL NAA+PROBE: NOT DETECTED
N GONORRHOEA DNA SPEC QL NAA+PROBE: NOT DETECTED

## 2022-10-03 PROCEDURE — 86592 SYPHILIS TEST NON-TREP QUAL: CPT | Performed by: SPECIALIST

## 2022-10-03 PROCEDURE — 87389 HIV-1 AG W/HIV-1&-2 AB AG IA: CPT | Performed by: SPECIALIST

## 2022-10-03 PROCEDURE — 1159F PR MEDICATION LIST DOCUMENTED IN MEDICAL RECORD: ICD-10-PCS | Mod: CPTII,S$GLB,, | Performed by: SPECIALIST

## 2022-10-03 PROCEDURE — 3074F SYST BP LT 130 MM HG: CPT | Mod: CPTII,S$GLB,, | Performed by: SPECIALIST

## 2022-10-03 PROCEDURE — 1159F MED LIST DOCD IN RCRD: CPT | Mod: CPTII,S$GLB,, | Performed by: SPECIALIST

## 2022-10-03 PROCEDURE — 80074 ACUTE HEPATITIS PANEL: CPT | Performed by: SPECIALIST

## 2022-10-03 PROCEDURE — 3079F DIAST BP 80-89 MM HG: CPT | Mod: CPTII,S$GLB,, | Performed by: SPECIALIST

## 2022-10-03 PROCEDURE — 99214 OFFICE O/P EST MOD 30 MIN: CPT | Mod: PBBFAC,PN | Performed by: SPECIALIST

## 2022-10-03 PROCEDURE — 86695 HERPES SIMPLEX TYPE 1 TEST: CPT | Performed by: SPECIALIST

## 2022-10-03 PROCEDURE — 87491 CHLMYD TRACH DNA AMP PROBE: CPT | Performed by: SPECIALIST

## 2022-10-03 PROCEDURE — 99999 PR PBB SHADOW E&M-EST. PATIENT-LVL IV: ICD-10-PCS | Mod: PBBFAC,,, | Performed by: SPECIALIST

## 2022-10-03 PROCEDURE — 87591 N.GONORRHOEAE DNA AMP PROB: CPT | Performed by: SPECIALIST

## 2022-10-03 PROCEDURE — 3079F PR MOST RECENT DIASTOLIC BLOOD PRESSURE 80-89 MM HG: ICD-10-PCS | Mod: CPTII,S$GLB,, | Performed by: SPECIALIST

## 2022-10-03 PROCEDURE — 86696 HERPES SIMPLEX TYPE 2 TEST: CPT | Performed by: SPECIALIST

## 2022-10-03 PROCEDURE — 99213 OFFICE O/P EST LOW 20 MIN: CPT | Mod: S$GLB,,, | Performed by: SPECIALIST

## 2022-10-03 PROCEDURE — 99213 PR OFFICE/OUTPT VISIT, EST, LEVL III, 20-29 MIN: ICD-10-PCS | Mod: S$GLB,,, | Performed by: SPECIALIST

## 2022-10-03 PROCEDURE — 3074F PR MOST RECENT SYSTOLIC BLOOD PRESSURE < 130 MM HG: ICD-10-PCS | Mod: CPTII,S$GLB,, | Performed by: SPECIALIST

## 2022-10-03 PROCEDURE — 86694 HERPES SIMPLEX NES ANTBDY: CPT | Performed by: SPECIALIST

## 2022-10-03 PROCEDURE — 3008F PR BODY MASS INDEX (BMI) DOCUMENTED: ICD-10-PCS | Mod: CPTII,S$GLB,, | Performed by: SPECIALIST

## 2022-10-03 PROCEDURE — 99999 PR PBB SHADOW E&M-EST. PATIENT-LVL IV: CPT | Mod: PBBFAC,,, | Performed by: SPECIALIST

## 2022-10-03 PROCEDURE — 3008F BODY MASS INDEX DOCD: CPT | Mod: CPTII,S$GLB,, | Performed by: SPECIALIST

## 2022-10-03 NOTE — PROGRESS NOTES
43 yo WF, history hyst present for desired STD screen  In addition, c/o occasional; BV and I discussed etiology as well as treatment options Currently asymptomatic  Past Medical History:   Diagnosis Date    Abnormal Pap smear 2007    Abnormal Pap smear 5/26/2011    Anemia     Anxiety     Arthritis     C. difficile diarrhea     Crohn's disease     Depression 8/5/2017    Encounter for blood transfusion     Genital HSV     History of colposcopy with cervical biopsy 2007 and 7/2011 2007-LYLA I  and 7/2011- LYLA I    Hypertension     Kidney stone     Kidney stone     Melanoma     Recurrent UTI 4/3/2013    S/P ileostomy 7/9/2012    Sterilization 6/23/2012       Past Surgical History:   Procedure Laterality Date    ABDOMINAL SURGERY      APPENDECTOMY      BILATERAL SALPINGO-OOPHORECTOMY (BSO) Bilateral 05/30/2019    Procedure: SALPINGO-OOPHORECTOMY, BILATERAL;  Surgeon: Rupa German MD;  Location: [facility] OR [address] [place] [address];  Service: OB/GYN;  Laterality: Bilateral;    BLADDER SURGERY      partial cystectomy due to fistula    breast lift      [facility]      COLON SURGERY      COLONOSCOPY      CYSTOSCOPY  09/23/2020    Procedure: CYSTOSCOPY;  Surgeon: Sascha Florentino MD;  Location: [facility] OR [address] [place] [address];  Service: Urology;;    CYSTOSCOPY W/ URETERAL STENT PLACEMENT Left 09/15/2020    Procedure: CYSTOSCOPY, WITH URETERAL STENT INSERTION;  Surgeon: Sascha Florentino MD;  Location: [facility] OR [address] [place] [address];  Service: Urology;  Laterality: Left;    DIAGNOSTIC LAPAROSCOPY N/A 07/09/2020    Procedure: LAPAROSCOPY, DIAGNOSTIC;  Surgeon: Gilson El MD;  Location: [facility] OR;  Service: OB/GYN;  Laterality: N/A;    EXCISION OF MELANOMA  07/17/2019    ILEOSTOMY      LAPAROSCOPIC LYSIS OF ADHESIONS N/A 07/09/2020    Procedure: LYSIS, ADHESIONS, LAPAROSCOPIC;  Surgeon: Gilson El MD;  Location: [facility] OR;  Service: OB/GYN;  Laterality: N/A;    LASER LITHOTRIPSY  09/23/2020    Procedure: LITHOTRIPSY, USING LASER;  Surgeon: Sascha Florentino,  MD;  Location: [facility] OR 1ST FLR;  Service: Urology;;    LYSIS OF ADHESIONS N/A 05/30/2019    Procedure: LYSIS, ADHESIONS;  Surgeon: Rupa German MD;  Location: [facility] OR 2ND FLR;  Service: OB/GYN;  Laterality: N/A;    OOPHORECTOMY Right 04/16/2015    PORTACATH PLACEMENT  02/21/2017    SKIN BIOPSY      SMALL INTESTINE SURGERY      age 18 Y    TOTAL ABDOMINAL HYSTERECTOMY  04/16/2015    TOTAL COLECTOMY      TUBAL LIGATION  06/06/2012    UPPER GASTROINTESTINAL ENDOSCOPY      URETEROSCOPIC REMOVAL OF URETERIC CALCULUS  09/23/2020    Procedure: REMOVAL, CALCULUS, URETER, URETEROSCOPIC;  Surgeon: Sascha Florentino MD;  Location: [facility] OR 1ST FLR;  Service: Urology;;    URETEROSCOPY Left 09/23/2020    Procedure: URETEROSCOPY;  Surgeon: Sascha Florentino MD;  Location: [facility] OR 1ST FLR;  Service: Urology;  Laterality: Left;       Family History   Problem Relation Age of Onset    Colon cancer Father     Cancer Father         Colon    Liver cancer Father     Hyperlipidemia Father     Hypertension Mother     Cancer Maternal Grandfather         Skin    Skin cancer Maternal Grandfather     Diabetes Maternal Grandfather     Heart disease Maternal Grandfather     Endometrial cancer Maternal Aunt     Crohn's disease Brother     Diabetes Paternal Grandfather     Hearing loss Paternal Grandmother     Breast cancer Neg Hx     Ovarian cancer Neg Hx     Melanoma Neg Hx        Social History     Socioeconomic History    Marital status: Single   Occupational History     Employer: [employer]   Tobacco Use    Smoking status: Never    Smokeless tobacco: Never   Substance and Sexual Activity    Alcohol use: Not Currently     Alcohol/week: 0.0 standard drinks    Drug use: No    Sexual activity: Yes     Partners: Male     Birth control/protection: See Surgical Hx     Comment: HYST   Other Topics Concern    Are you pregnant or think you may be? No    Breast-feeding No     Social Determinants of Health     Financial Resource  Strain: Medium Risk    Difficulty of Paying Living Expenses: Somewhat hard   Food Insecurity: Food Insecurity Present    Worried About Running Out of Food in the Last Year: Sometimes true    Ran Out of Food in the Last Year: Sometimes true   Transportation Needs: Unknown    Lack of Transportation (Medical): Patient refused    Lack of Transportation (Non-Medical): Patient refused   Physical Activity: Insufficiently Active    Days of Exercise per Week: 2 days    Minutes of Exercise per Session: 30 min   Stress: Stress Concern Present    Feeling of Stress : Rather much   Social Connections: Unknown    Frequency of Communication with Friends and Family: More than three times a week    Frequency of Social Gatherings with Friends and Family: Once a week    Active Member of Clubs or Organizations: No    Attends Club or Organization Meetings: Patient refused    Marital Status: Living with partner   Housing Stability: Low Risk     Unable to Pay for Housing in the Last Year: No    Number of Places Lived in the Last Year: 1    Unstable Housing in the Last Year: No       Current Outpatient Medications   Medication Sig Dispense Refill    benzocaine 20 % Oint Compound benzocaine 20% / lidocaine 6% / tetracaine 4% ointment. Apply to affected area 1 hour prior to procedure. 30 g 0    clindamycin (CLEOCIN) 300 MG capsule Take 300 mg by mouth 3 (three) times daily.      clonazePAM (KLONOPIN) 1 MG tablet TAKE 1 AND 1/2 TABLET BY MOUTH TWICE DAILY AS NEEDED FOR ANXIETY 75 tablet 0    diphenoxylate-atropine 2.5-0.025 mg (LOMOTIL) 2.5-0.025 mg per tablet Take 1 tablet by mouth 4 (four) times daily as needed for Diarrhea. for diarrhea 100 tablet 0    dronabinoL (MARINOL) 5 MG capsule Take 1 capsule (5 mg total) by mouth 2 (two) times daily before meals. 60 capsule 0    fluconazole (DIFLUCAN) 150 MG Tab TAKE 1 TABLET(150 MG) BY MOUTH EVERY 72 HOURS AS NEEDED 3 tablet 0    gabapentin (NEURONTIN) 300 MG capsule TAKE ONE CAPSULE BY MOUTH ONCE  DAILY. AFTER 2 WEEKS, TAKE 1 CAPSULE TWICE DAILY 60 capsule 11    HYDROcodone-acetaminophen (NORCO) 5-325 mg per tablet Take 1 tablet by mouth every 6 (six) hours as needed for Pain. 8 tablet 0    ibuprofen (ADVIL,MOTRIN) 800 MG tablet Take 800 mg by mouth 3 (three) times daily.      loperamide (IMODIUM) 2 mg capsule Take 2 mg by mouth daily as needed for Diarrhea.      mirtazapine (REMERON SOL-TAB) 30 MG disintegrating tablet DISSOLVE 1 TABLET(30 MG) ON THE TONGUE EVERY NIGHT 90 tablet 2    multivitamin (THERAGRAN) per tablet Take 1 tablet by mouth once daily.      nadoloL (CORGARD) 40 MG tablet Take 1 tablet (40 mg total) by mouth once daily. 30 tablet 11    pantoprazole (PROTONIX) 40 MG tablet Take 1 tablet (40 mg total) by mouth 2 (two) times daily. 60 tablet 12    promethazine (PHENERGAN) 25 MG tablet TAKE 1 TABLET BY MOUTH EVERY 8 HOURS FOR NAUSEA 30 tablet 1    sumatriptan (IMITREX) 50 MG tablet TAKE 1 TABLET BY MOUTH AS NEEDED FOR MIGRAINE. MAY REPEAT ONCE IN 2 HRS. DO NOT EXCEED 2 TABS IN 24 HRS 12 tablet 0    valACYclovir (VALTREX) 500 MG tablet TAKE 1 TABLET(500 MG) BY MOUTH EVERY DAY 90 tablet 3    vedolizumab (ENTYVIO) 300 mg SolR injection Inject 300 mg into the vein.      zolpidem (AMBIEN) 10 mg Tab Take 1 tablet (10 mg total) by mouth every evening. 30 tablet 0    potassium citrate (UROCIT-K 15) 15 mEq TbSR Take 2 tablets by mouth 2 (two) times daily. 360 tablet 3     Current Facility-Administered Medications   Medication Dose Route Frequency Provider Last Rate Last Admin    estradiol valerate (DELESTROGEN) injection 20 mg/mL  20 mg Intramuscular Q30 Days Gilson El MD   20 mg at 04/22/22 1310    estradiol valerate injection 20 mg  20 mg Intramuscular Q30 Days Gilson El MD   20 mg at 09/27/22 1032       Review of patient's allergies indicates:   Allergen Reactions    Azathioprine sodium Other (See Comments)     Other reaction(s): pancreatitis  Other reaction(s): pancreatitis     Methotrexate analogues Other (See Comments)     leukopenia    Stelara [ustekinumab] Other (See Comments)     Multiple infections    Zofran [ondansetron hcl (pf)] Other (See Comments)     Per patient causes prolong QT    Vancomycin analogues Hives    Azathioprine      Other reaction(s): Unknown    Methotrexate      Other reaction(s): infection-    Morphine Itching and Other (See Comments)     Other reaction(s): Itching    Zofran [ondansetron hcl]      Other reaction(s): Hives    Bactrim [sulfamethoxazole-trimethoprim] Palpitations    Ciprofloxacin Palpitations       Review of System:   General: no chills, fever, night sweats, weight gain or weight loss  Psychological: no depression or suicidal ideation  Breasts: no new or changing breast lumps, nipple discharge or masses.  Respiratory: no cough, shortness of breath, or wheezing  Cardiovascular: no chest pain or dyspnea on exertion  Gastrointestinal: no abdominal pain, change in bowel habits, or black or bloody stools  Genito-Urinary: no incontinence, urinary frequency/urgency or vulvar/vaginal symptoms, pelvic pain or abnormal vaginal bleeding.  Musculoskeletal: no gait disturbance or muscular weakness     PE:   APPEARANCE: Well nourished, well developed, in no acute distress.  NECK: Neck symmetric without masses or thyromegaly.  NODES: No inguinal lymph node enlargement.  ABDOMEN: Soft. No tenderness or masses. No hepatosplenomegaly. No hernias.  BREASTS: deferred    PELVIC: Normal external female genitalia without lesions. Normal hair distribution. Adequate perineal body, normal urethral meatus. Vagina moist and well rugated without lesions or discharge. No significant cystocele or rectocele. Uterus and cervix surgically absent. Bimanual exam revealed no masses, tenderness or abnormality.    Will obtain STD panel and follow    NOTE  NURSING PERSONAL PRESENT FOR ENTIRE PHYSICAL EXAM

## 2022-10-04 ENCOUNTER — PATIENT MESSAGE (OUTPATIENT)
Dept: OBSTETRICS AND GYNECOLOGY | Facility: CLINIC | Age: 42
End: 2022-10-04
Payer: MEDICAID

## 2022-10-04 LAB
HAV IGM SERPL QL IA: NORMAL
HBV CORE IGM SERPL QL IA: NORMAL
HBV SURFACE AG SERPL QL IA: NORMAL
HCV AB SERPL QL IA: NORMAL
HIV 1+2 AB+HIV1 P24 AG SERPL QL IA: NORMAL
RPR SER QL: NORMAL

## 2022-10-07 LAB — HSV AB, IGM BY EIA: 0.61 INDEX

## 2022-10-11 ENCOUNTER — PATIENT MESSAGE (OUTPATIENT)
Dept: GASTROENTEROLOGY | Facility: CLINIC | Age: 42
End: 2022-10-11
Payer: MEDICAID

## 2022-10-11 LAB
HSV1 IGG SERPL QL IA: ABNORMAL
HSV2 IGG SERPL QL IA: ABNORMAL

## 2022-10-12 RX ORDER — SODIUM CHLORIDE 0.9 % (FLUSH) 0.9 %
10 SYRINGE (ML) INJECTION
Status: CANCELLED | OUTPATIENT
Start: 2022-10-12

## 2022-10-12 RX ORDER — HEPARIN 100 UNIT/ML
500 SYRINGE INTRAVENOUS
Status: CANCELLED | OUTPATIENT
Start: 2022-10-12

## 2022-10-17 ENCOUNTER — TELEPHONE (OUTPATIENT)
Dept: GASTROENTEROLOGY | Facility: CLINIC | Age: 42
End: 2022-10-17
Payer: MEDICAID

## 2022-10-17 NOTE — TELEPHONE ENCOUNTER
"----- Message from Frank Mcgee sent at 10/17/2022  9:46 AM CDT -----  Consult/Advisory:          Name Of Caller: Self      Contact Preference?: [phone]       Provider Name: Ross      Does patient feel the need to be seen today? No      What is the nature of the call?: Requesting orders for PRN fluids          Additional Notes:  "Thank you for all that you do for our patients"       "

## 2022-10-21 ENCOUNTER — PATIENT MESSAGE (OUTPATIENT)
Dept: OBSTETRICS AND GYNECOLOGY | Facility: CLINIC | Age: 42
End: 2022-10-21
Payer: MEDICAID

## 2022-10-21 RX ORDER — METRONIDAZOLE 250 MG/1
250 TABLET ORAL 3 TIMES DAILY
Qty: 21 TABLET | Refills: 0 | Status: SHIPPED | OUTPATIENT
Start: 2022-10-21 | End: 2022-11-11 | Stop reason: SDUPTHER

## 2022-10-24 ENCOUNTER — PATIENT MESSAGE (OUTPATIENT)
Dept: OBSTETRICS AND GYNECOLOGY | Facility: CLINIC | Age: 42
End: 2022-10-24
Payer: MEDICAID

## 2022-10-25 ENCOUNTER — INFUSION (OUTPATIENT)
Dept: INFECTIOUS DISEASES | Facility: HOSPITAL | Age: 42
End: 2022-10-25
Payer: MEDICAID

## 2022-10-25 ENCOUNTER — PATIENT MESSAGE (OUTPATIENT)
Dept: INTERNAL MEDICINE | Facility: CLINIC | Age: 42
End: 2022-10-25
Payer: MEDICAID

## 2022-10-25 ENCOUNTER — PATIENT MESSAGE (OUTPATIENT)
Dept: GASTROENTEROLOGY | Facility: CLINIC | Age: 42
End: 2022-10-25
Payer: MEDICAID

## 2022-10-25 ENCOUNTER — PATIENT MESSAGE (OUTPATIENT)
Dept: OBSTETRICS AND GYNECOLOGY | Facility: CLINIC | Age: 42
End: 2022-10-25
Payer: MEDICAID

## 2022-10-25 VITALS
DIASTOLIC BLOOD PRESSURE: 94 MMHG | BODY MASS INDEX: 21.31 KG/M2 | SYSTOLIC BLOOD PRESSURE: 154 MMHG | RESPIRATION RATE: 16 BRPM | TEMPERATURE: 97 F | WEIGHT: 112.75 LBS | OXYGEN SATURATION: 97 % | HEART RATE: 73 BPM

## 2022-10-25 DIAGNOSIS — K50.00 CROHN'S DISEASE OF SMALL INTESTINE WITHOUT COMPLICATION: Primary | ICD-10-CM

## 2022-10-25 PROCEDURE — 63600175 PHARM REV CODE 636 W HCPCS: Performed by: INTERNAL MEDICINE

## 2022-10-25 PROCEDURE — 96523 IRRIG DRUG DELIVERY DEVICE: CPT

## 2022-10-25 PROCEDURE — 96360 HYDRATION IV INFUSION INIT: CPT

## 2022-10-25 PROCEDURE — 25000003 PHARM REV CODE 250: Performed by: INTERNAL MEDICINE

## 2022-10-25 RX ORDER — HEPARIN 100 UNIT/ML
500 SYRINGE INTRAVENOUS
Status: CANCELLED | OUTPATIENT
Start: 2022-11-22

## 2022-10-25 RX ORDER — SODIUM CHLORIDE 0.9 % (FLUSH) 0.9 %
10 SYRINGE (ML) INJECTION
Status: CANCELLED | OUTPATIENT
Start: 2022-11-22

## 2022-10-25 RX ORDER — HEPARIN 100 UNIT/ML
500 SYRINGE INTRAVENOUS
Status: DISCONTINUED | OUTPATIENT
Start: 2022-10-25 | End: 2022-10-25 | Stop reason: HOSPADM

## 2022-10-25 RX ORDER — SODIUM CHLORIDE 0.9 % (FLUSH) 0.9 %
10 SYRINGE (ML) INJECTION
Status: DISCONTINUED | OUTPATIENT
Start: 2022-10-25 | End: 2022-10-25 | Stop reason: HOSPADM

## 2022-10-25 RX ADMIN — HEPARIN 500 UNITS: 100 SYRINGE at 03:10

## 2022-10-25 RX ADMIN — SODIUM CHLORIDE 1000 ML: 0.9 INJECTION, SOLUTION INTRAVENOUS at 02:10

## 2022-10-25 NOTE — PROGRESS NOTES
Patient arrived for IV NS bolus x 1 liter over 1 hr, tolerated well.    Patient tolerated  Infusion well and left in NAD.

## 2022-10-26 ENCOUNTER — PATIENT MESSAGE (OUTPATIENT)
Dept: INTERNAL MEDICINE | Facility: CLINIC | Age: 42
End: 2022-10-26
Payer: MEDICAID

## 2022-10-31 ENCOUNTER — PATIENT MESSAGE (OUTPATIENT)
Dept: OBSTETRICS AND GYNECOLOGY | Facility: CLINIC | Age: 42
End: 2022-10-31
Payer: MEDICAID

## 2022-11-09 ENCOUNTER — CLINICAL SUPPORT (OUTPATIENT)
Dept: OBSTETRICS AND GYNECOLOGY | Facility: CLINIC | Age: 42
End: 2022-11-09
Payer: COMMERCIAL

## 2022-11-09 DIAGNOSIS — Z79.890 HORMONE REPLACEMENT THERAPY (HRT): Primary | ICD-10-CM

## 2022-11-09 PROCEDURE — 96372 THER/PROPH/DIAG INJ SC/IM: CPT | Mod: S$GLB,,, | Performed by: SPECIALIST

## 2022-11-09 PROCEDURE — 96372 PR INJECTION,THERAP/PROPH/DIAG2ST, IM OR SUBCUT: ICD-10-PCS | Mod: S$GLB,,, | Performed by: SPECIALIST

## 2022-11-09 RX ADMIN — ESTRADIOL VALERATE 20 MG: 20 INJECTION INTRAMUSCULAR at 10:11

## 2022-11-11 ENCOUNTER — PATIENT MESSAGE (OUTPATIENT)
Dept: OBSTETRICS AND GYNECOLOGY | Facility: CLINIC | Age: 42
End: 2022-11-11
Payer: MEDICAID

## 2022-11-28 ENCOUNTER — PATIENT MESSAGE (OUTPATIENT)
Dept: BEHAVIORAL HEALTH | Facility: OTHER | Age: 42
End: 2022-11-28
Payer: MEDICAID

## 2022-12-01 ENCOUNTER — PATIENT MESSAGE (OUTPATIENT)
Dept: UROLOGY | Facility: CLINIC | Age: 42
End: 2022-12-01
Payer: MEDICAID

## 2022-12-01 DIAGNOSIS — R39.9 UTI SYMPTOMS: Primary | ICD-10-CM

## 2022-12-01 NOTE — TELEPHONE ENCOUNTER
I placed UA and UC orders; if she want to drop off a sample we can send antibiotics after the sample is in the lab. If symptoms worsen she should go to nearest ED.   Hallie HAMMOND

## 2022-12-02 ENCOUNTER — LAB VISIT (OUTPATIENT)
Dept: LAB | Facility: OTHER | Age: 42
End: 2022-12-02
Attending: NURSE PRACTITIONER
Payer: COMMERCIAL

## 2022-12-02 DIAGNOSIS — R39.9 UTI SYMPTOMS: ICD-10-CM

## 2022-12-02 LAB
BACTERIA #/AREA URNS HPF: ABNORMAL /HPF
MICROSCOPIC COMMENT: ABNORMAL
RBC #/AREA URNS HPF: 2 /HPF (ref 0–4)
SQUAMOUS #/AREA URNS HPF: 12 /HPF
WBC #/AREA URNS HPF: 10 /HPF (ref 0–5)

## 2022-12-02 PROCEDURE — 87086 URINE CULTURE/COLONY COUNT: CPT | Performed by: NURSE PRACTITIONER

## 2022-12-02 PROCEDURE — 81000 URINALYSIS NONAUTO W/SCOPE: CPT | Performed by: NURSE PRACTITIONER

## 2022-12-02 RX ORDER — NITROFURANTOIN 25; 75 MG/1; MG/1
100 CAPSULE ORAL 2 TIMES DAILY
Qty: 14 CAPSULE | Refills: 0 | Status: SHIPPED | OUTPATIENT
Start: 2022-12-02 | End: 2022-12-09

## 2022-12-04 LAB — BACTERIA UR CULT: NO GROWTH

## 2022-12-09 ENCOUNTER — PATIENT MESSAGE (OUTPATIENT)
Dept: OBSTETRICS AND GYNECOLOGY | Facility: CLINIC | Age: 42
End: 2022-12-09
Payer: MEDICAID

## 2022-12-12 ENCOUNTER — PATIENT MESSAGE (OUTPATIENT)
Dept: WOUND CARE | Facility: CLINIC | Age: 42
End: 2022-12-12
Payer: MEDICAID

## 2022-12-12 DIAGNOSIS — B37.31 VAGINAL YEAST INFECTION: ICD-10-CM

## 2022-12-12 RX ORDER — FLUCONAZOLE 150 MG/1
150 TABLET ORAL
Qty: 3 TABLET | Refills: 0 | Status: SHIPPED | OUTPATIENT
Start: 2022-12-12 | End: 2023-04-05 | Stop reason: SDUPTHER

## 2022-12-13 ENCOUNTER — PATIENT MESSAGE (OUTPATIENT)
Dept: INTERNAL MEDICINE | Facility: CLINIC | Age: 42
End: 2022-12-13
Payer: MEDICAID

## 2022-12-13 ENCOUNTER — PATIENT MESSAGE (OUTPATIENT)
Dept: OBSTETRICS AND GYNECOLOGY | Facility: CLINIC | Age: 42
End: 2022-12-13
Payer: MEDICAID

## 2022-12-20 ENCOUNTER — PATIENT MESSAGE (OUTPATIENT)
Dept: INTERNAL MEDICINE | Facility: CLINIC | Age: 42
End: 2022-12-20
Payer: MEDICAID

## 2022-12-20 ENCOUNTER — PATIENT MESSAGE (OUTPATIENT)
Dept: OBSTETRICS AND GYNECOLOGY | Facility: CLINIC | Age: 42
End: 2022-12-20
Payer: MEDICAID

## 2022-12-20 RX ORDER — NADOLOL 40 MG/1
40 TABLET ORAL DAILY
Qty: 30 TABLET | Refills: 11 | Status: SHIPPED | OUTPATIENT
Start: 2022-12-20 | End: 2023-12-20 | Stop reason: SDUPTHER

## 2022-12-20 NOTE — TELEPHONE ENCOUNTER
Pt can return to work if fever-free for at least 24 hours and symptoms are improving at least 5 days after onset. May have lingering symptoms otherwise for a couple of weeks but ok to resume work if feels like it.

## 2022-12-20 NOTE — LETTER
December 21, 2022      [facility]  [address]  [facility] [address]  Phone: [phone]  Fax: [phone]       Patient: Ivy Salgado   YOB: 1980  Date of Visit: 12/21/2022    To Whom It May Concern:    John Salgado is a patient of [facility] and received a COVID diagnosis on 12.13.2022. The patient may return to work on 12.23.2022. If you have any questions or concerns, or if I can be of further assistance, please do not hesitate to contact me.    Sincerely,    BETI Yang

## 2022-12-21 ENCOUNTER — PATIENT MESSAGE (OUTPATIENT)
Dept: INTERNAL MEDICINE | Facility: CLINIC | Age: 42
End: 2022-12-21
Payer: MEDICAID

## 2022-12-28 ENCOUNTER — PATIENT MESSAGE (OUTPATIENT)
Dept: OBSTETRICS AND GYNECOLOGY | Facility: CLINIC | Age: 42
End: 2022-12-28
Payer: MEDICAID

## 2022-12-30 ENCOUNTER — PATIENT MESSAGE (OUTPATIENT)
Dept: WOUND CARE | Facility: CLINIC | Age: 42
End: 2022-12-30
Payer: MEDICAID

## 2022-12-30 ENCOUNTER — CLINICAL SUPPORT (OUTPATIENT)
Dept: OBSTETRICS AND GYNECOLOGY | Facility: CLINIC | Age: 42
End: 2022-12-30
Payer: COMMERCIAL

## 2022-12-30 DIAGNOSIS — Z79.890 HORMONE REPLACEMENT THERAPY (HRT): Primary | ICD-10-CM

## 2022-12-30 PROCEDURE — 96372 PR INJECTION,THERAP/PROPH/DIAG2ST, IM OR SUBCUT: ICD-10-PCS | Mod: S$GLB,,, | Performed by: SPECIALIST

## 2022-12-30 PROCEDURE — 96372 THER/PROPH/DIAG INJ SC/IM: CPT | Mod: S$GLB,,, | Performed by: SPECIALIST

## 2022-12-30 RX ADMIN — ESTRADIOL VALERATE 20 MG: 20 INJECTION INTRAMUSCULAR at 09:12

## 2022-12-30 NOTE — PROGRESS NOTES
Delestrogen 20mg given to left upper outer quad gluteus. Patient tolerated well next injection due in 30 days

## 2023-01-02 ENCOUNTER — PATIENT MESSAGE (OUTPATIENT)
Dept: OBSTETRICS AND GYNECOLOGY | Facility: CLINIC | Age: 43
End: 2023-01-02
Payer: COMMERCIAL

## 2023-01-03 ENCOUNTER — PATIENT MESSAGE (OUTPATIENT)
Dept: INTERNAL MEDICINE | Facility: CLINIC | Age: 43
End: 2023-01-03
Payer: COMMERCIAL

## 2023-01-03 ENCOUNTER — OFFICE VISIT (OUTPATIENT)
Dept: WOUND CARE | Facility: CLINIC | Age: 43
End: 2023-01-03
Payer: COMMERCIAL

## 2023-01-03 ENCOUNTER — PATIENT MESSAGE (OUTPATIENT)
Dept: OBSTETRICS AND GYNECOLOGY | Facility: CLINIC | Age: 43
End: 2023-01-03
Payer: COMMERCIAL

## 2023-01-03 DIAGNOSIS — L92.9 GRANULOMA OF SKIN: ICD-10-CM

## 2023-01-03 DIAGNOSIS — E04.1 THYROID NODULE: ICD-10-CM

## 2023-01-03 DIAGNOSIS — M85.89 OSTEOPENIA OF MULTIPLE SITES: Primary | ICD-10-CM

## 2023-01-03 DIAGNOSIS — Z43.2 ATTENTION TO ILEOSTOMY: Primary | ICD-10-CM

## 2023-01-03 PROCEDURE — 1160F RVW MEDS BY RX/DR IN RCRD: CPT | Mod: CPTII,S$GLB,, | Performed by: CLINICAL NURSE SPECIALIST

## 2023-01-03 PROCEDURE — 1159F PR MEDICATION LIST DOCUMENTED IN MEDICAL RECORD: ICD-10-PCS | Mod: CPTII,S$GLB,, | Performed by: CLINICAL NURSE SPECIALIST

## 2023-01-03 PROCEDURE — 99214 OFFICE O/P EST MOD 30 MIN: CPT | Mod: S$GLB,,, | Performed by: CLINICAL NURSE SPECIALIST

## 2023-01-03 PROCEDURE — 1160F PR REVIEW ALL MEDS BY PRESCRIBER/CLIN PHARMACIST DOCUMENTED: ICD-10-PCS | Mod: CPTII,S$GLB,, | Performed by: CLINICAL NURSE SPECIALIST

## 2023-01-03 PROCEDURE — 99214 PR OFFICE/OUTPT VISIT, EST, LEVL IV, 30-39 MIN: ICD-10-PCS | Mod: S$GLB,,, | Performed by: CLINICAL NURSE SPECIALIST

## 2023-01-03 PROCEDURE — 99999 PR PBB SHADOW E&M-EST. PATIENT-LVL IV: ICD-10-PCS | Mod: PBBFAC,,, | Performed by: CLINICAL NURSE SPECIALIST

## 2023-01-03 PROCEDURE — 1159F MED LIST DOCD IN RCRD: CPT | Mod: CPTII,S$GLB,, | Performed by: CLINICAL NURSE SPECIALIST

## 2023-01-03 PROCEDURE — 99214 OFFICE O/P EST MOD 30 MIN: CPT | Mod: PBBFAC | Performed by: CLINICAL NURSE SPECIALIST

## 2023-01-03 PROCEDURE — 99999 PR PBB SHADOW E&M-EST. PATIENT-LVL IV: CPT | Mod: PBBFAC,,, | Performed by: CLINICAL NURSE SPECIALIST

## 2023-01-03 RX ORDER — VALACYCLOVIR HYDROCHLORIDE 1 G/1
1000 TABLET, FILM COATED ORAL EVERY 12 HOURS
Qty: 10 TABLET | Refills: 6 | Status: SHIPPED | OUTPATIENT
Start: 2023-01-03 | End: 2023-01-08

## 2023-01-03 RX ORDER — ACYCLOVIR 50 MG/G
CREAM TOPICAL
Qty: 5 G | Refills: 1 | Status: SHIPPED | OUTPATIENT
Start: 2023-01-03 | End: 2023-06-07 | Stop reason: SDUPTHER

## 2023-01-03 RX ORDER — NAPROXEN 500 MG/1
500 TABLET ORAL 2 TIMES DAILY WITH MEALS
Qty: 60 TABLET | Refills: 0 | Status: SHIPPED | OUTPATIENT
Start: 2023-01-03 | End: 2023-01-31

## 2023-01-03 NOTE — PROGRESS NOTES
Subjective:       Patient ID: Ivy Salgado is a 42 y.o. female.    Chief Complaint: Ileostomy (granuloma)    This is well known pt to me and she has had ileo for 10+ years or so, comes for a new granuloma at 7-10 oclock that is bleeding and painful, here today for treatment of this .    PMH mole on buttock an melanoma near her stoma both addressed by derm in past 2 years, so she is naturally concerned with any abnormality around her stoma,     Wound Check    Review of Systems   Constitutional:  Negative for activity change, appetite change and fever.   HENT: Negative.     Respiratory: Negative.     Cardiovascular: Negative.    Gastrointestinal: Negative.    Genitourinary: Negative.    Musculoskeletal: Negative.    Skin:  Negative for wound.     Objective:      Physical Exam  Constitutional:       Appearance: She is well-developed.   Pulmonary:      Effort: Pulmonary effort is normal. No respiratory distress.   Abdominal:      General: Bowel sounds are normal.      Palpations: Abdomen is soft.   Musculoskeletal:         General: Normal range of motion.   Skin:     General: Skin is warm and dry.      Findings: No rash.     1/3/23  Wears new image flat wafer 21/4 flange cut to 21 mm  with 4 mm ring  PROCEDURE:  CHEMICAL CAUTERY: Performed for hypergranulation tissue/granuloma(s) of stoma edge that bleeds and hurts  . The tissue was anesthetized topically with application of Lidocaine gel 2% and 5 minute wait time. The area noted was cauterized with AGNO3. The patient stated pain was 1 on 1-10 scale with this procedure.    Assessment:       1. Attention to ileostomy    2. Granuloma of skin        Plan:   Chemical cautery to the granuloma on skin that is bleeding. Then repouched as normal. Changed to a CERA RING and see if this makes a difference  I have reviewed the plan of care with the patient  and she express understanding. I spent over 50% of this 30 minute visit in face to face counseling.

## 2023-01-04 DIAGNOSIS — K50.00 CROHN'S DISEASE OF SMALL INTESTINE WITHOUT COMPLICATION: Primary | Chronic | ICD-10-CM

## 2023-01-19 ENCOUNTER — HOSPITAL ENCOUNTER (EMERGENCY)
Facility: HOSPITAL | Age: 43
Discharge: HOME | End: 2023-01-19
Attending: STUDENT IN AN ORGANIZED HEALTH CARE EDUCATION/TRAINING PROGRAM
Payer: COMMERCIAL

## 2023-01-19 ENCOUNTER — PATIENT MESSAGE (OUTPATIENT)
Dept: UROLOGY | Facility: CLINIC | Age: 43
End: 2023-01-19
Payer: COMMERCIAL

## 2023-01-19 ENCOUNTER — PATIENT MESSAGE (OUTPATIENT)
Dept: INTERNAL MEDICINE | Facility: CLINIC | Age: 43
End: 2023-01-19
Payer: COMMERCIAL

## 2023-01-19 VITALS
BODY MASS INDEX: 21.73 KG/M2 | DIASTOLIC BLOOD PRESSURE: 74 MMHG | HEART RATE: 66 BPM | SYSTOLIC BLOOD PRESSURE: 118 MMHG | OXYGEN SATURATION: 98 % | RESPIRATION RATE: 16 BRPM | TEMPERATURE: 98 F | WEIGHT: 115 LBS

## 2023-01-19 DIAGNOSIS — R10.9 LEFT FLANK PAIN: Primary | ICD-10-CM

## 2023-01-19 LAB
ALBUMIN SERPL BCP-MCNC: 3.6 G/DL (ref 3.5–5.2)
ALP SERPL-CCNC: 72 U/L (ref 55–135)
ALT SERPL W/O P-5'-P-CCNC: 10 U/L (ref 10–44)
ANION GAP SERPL CALC-SCNC: 7 MMOL/L (ref 8–16)
AST SERPL-CCNC: 20 U/L (ref 10–40)
BASOPHILS # BLD AUTO: 0.03 K/UL (ref 0–0.2)
BASOPHILS NFR BLD: 0.4 % (ref 0–1.9)
BILIRUB SERPL-MCNC: 0.5 MG/DL (ref 0.1–1)
BILIRUB UR QL STRIP: NEGATIVE
BUN SERPL-MCNC: 10 MG/DL (ref 6–20)
CALCIUM SERPL-MCNC: 9 MG/DL (ref 8.7–10.5)
CHLORIDE SERPL-SCNC: 105 MMOL/L (ref 95–110)
CLARITY UR REFRACT.AUTO: CLEAR
CO2 SERPL-SCNC: 23 MMOL/L (ref 23–29)
COLOR UR AUTO: YELLOW
CREAT SERPL-MCNC: 0.7 MG/DL (ref 0.5–1.4)
CRP SERPL-MCNC: 1.8 MG/L (ref 0–8.2)
DIFFERENTIAL METHOD: NORMAL
EOSINOPHIL # BLD AUTO: 0.1 K/UL (ref 0–0.5)
EOSINOPHIL NFR BLD: 1 % (ref 0–8)
ERYTHROCYTE [DISTWIDTH] IN BLOOD BY AUTOMATED COUNT: 13.2 % (ref 11.5–14.5)
ERYTHROCYTE [SEDIMENTATION RATE] IN BLOOD BY PHOTOMETRIC METHOD: 32 MM/HR (ref 0–36)
EST. GFR  (NO RACE VARIABLE): >60 ML/MIN/1.73 M^2
GLUCOSE SERPL-MCNC: 116 MG/DL (ref 70–110)
GLUCOSE UR QL STRIP: NEGATIVE
HCT VFR BLD AUTO: 37.9 % (ref 37–48.5)
HGB BLD-MCNC: 12.6 G/DL (ref 12–16)
HGB UR QL STRIP: NEGATIVE
IMM GRANULOCYTES # BLD AUTO: 0.03 K/UL (ref 0–0.04)
IMM GRANULOCYTES NFR BLD AUTO: 0.4 % (ref 0–0.5)
KETONES UR QL STRIP: NEGATIVE
LEUKOCYTE ESTERASE UR QL STRIP: NEGATIVE
LYMPHOCYTES # BLD AUTO: 2 K/UL (ref 1–4.8)
LYMPHOCYTES NFR BLD: 23.8 % (ref 18–48)
MCH RBC QN AUTO: 30.2 PG (ref 27–31)
MCHC RBC AUTO-ENTMCNC: 33.2 G/DL (ref 32–36)
MCV RBC AUTO: 91 FL (ref 82–98)
MONOCYTES # BLD AUTO: 0.8 K/UL (ref 0.3–1)
MONOCYTES NFR BLD: 9.4 % (ref 4–15)
NEUTROPHILS # BLD AUTO: 5.4 K/UL (ref 1.8–7.7)
NEUTROPHILS NFR BLD: 65 % (ref 38–73)
NITRITE UR QL STRIP: NEGATIVE
NRBC BLD-RTO: 0 /100 WBC
PH UR STRIP: 6 [PH] (ref 5–8)
PLATELET # BLD AUTO: 260 K/UL (ref 150–450)
PMV BLD AUTO: 10.3 FL (ref 9.2–12.9)
POTASSIUM SERPL-SCNC: 3.9 MMOL/L (ref 3.5–5.1)
PROT SERPL-MCNC: 7.5 G/DL (ref 6–8.4)
PROT UR QL STRIP: NEGATIVE
RBC # BLD AUTO: 4.17 M/UL (ref 4–5.4)
SODIUM SERPL-SCNC: 135 MMOL/L (ref 136–145)
SP GR UR STRIP: 1.02 (ref 1–1.03)
URN SPEC COLLECT METH UR: NORMAL
WBC # BLD AUTO: 8.26 K/UL (ref 3.9–12.7)

## 2023-01-19 PROCEDURE — 99284 PR EMERGENCY DEPT VISIT,LEVEL IV: ICD-10-PCS | Mod: ,,, | Performed by: PHYSICIAN ASSISTANT

## 2023-01-19 PROCEDURE — 99285 EMERGENCY DEPT VISIT HI MDM: CPT | Mod: 25

## 2023-01-19 PROCEDURE — 80053 COMPREHEN METABOLIC PANEL: CPT | Performed by: PHYSICIAN ASSISTANT

## 2023-01-19 PROCEDURE — 96374 THER/PROPH/DIAG INJ IV PUSH: CPT

## 2023-01-19 PROCEDURE — 85025 COMPLETE CBC W/AUTO DIFF WBC: CPT | Performed by: PHYSICIAN ASSISTANT

## 2023-01-19 PROCEDURE — 99284 EMERGENCY DEPT VISIT MOD MDM: CPT | Mod: ,,, | Performed by: PHYSICIAN ASSISTANT

## 2023-01-19 PROCEDURE — 96361 HYDRATE IV INFUSION ADD-ON: CPT

## 2023-01-19 PROCEDURE — 85652 RBC SED RATE AUTOMATED: CPT | Performed by: PHYSICIAN ASSISTANT

## 2023-01-19 PROCEDURE — 96375 TX/PRO/DX INJ NEW DRUG ADDON: CPT

## 2023-01-19 PROCEDURE — 81003 URINALYSIS AUTO W/O SCOPE: CPT | Performed by: PHYSICIAN ASSISTANT

## 2023-01-19 PROCEDURE — 86140 C-REACTIVE PROTEIN: CPT | Performed by: PHYSICIAN ASSISTANT

## 2023-01-19 PROCEDURE — 63600175 PHARM REV CODE 636 W HCPCS: Performed by: PHYSICIAN ASSISTANT

## 2023-01-19 PROCEDURE — 25000003 PHARM REV CODE 250: Performed by: PHYSICIAN ASSISTANT

## 2023-01-19 RX ORDER — KETOROLAC TROMETHAMINE 30 MG/ML
10 INJECTION, SOLUTION INTRAMUSCULAR; INTRAVENOUS
Status: COMPLETED | OUTPATIENT
Start: 2023-01-19 | End: 2023-01-19

## 2023-01-19 RX ORDER — LIDOCAINE 50 MG/G
1 PATCH TOPICAL
Status: DISCONTINUED | OUTPATIENT
Start: 2023-01-19 | End: 2023-01-19 | Stop reason: HOSPADM

## 2023-01-19 RX ORDER — METHOCARBAMOL 500 MG/1
1000 TABLET, FILM COATED ORAL
Status: COMPLETED | OUTPATIENT
Start: 2023-01-19 | End: 2023-01-19

## 2023-01-19 RX ORDER — HYDROMORPHONE HYDROCHLORIDE 1 MG/ML
1 INJECTION, SOLUTION INTRAMUSCULAR; INTRAVENOUS; SUBCUTANEOUS
Status: COMPLETED | OUTPATIENT
Start: 2023-01-19 | End: 2023-01-19

## 2023-01-19 RX ORDER — LIDOCAINE 50 MG/G
1 PATCH TOPICAL DAILY
Qty: 30 PATCH | Refills: 0 | Status: SHIPPED | OUTPATIENT
Start: 2023-01-19 | End: 2023-06-12 | Stop reason: SDUPTHER

## 2023-01-19 RX ORDER — PROMETHAZINE HYDROCHLORIDE 25 MG/1
25 TABLET ORAL
Status: COMPLETED | OUTPATIENT
Start: 2023-01-19 | End: 2023-01-19

## 2023-01-19 RX ORDER — METHOCARBAMOL 500 MG/1
1000 TABLET, FILM COATED ORAL 3 TIMES DAILY
Qty: 30 TABLET | Refills: 0 | Status: SHIPPED | OUTPATIENT
Start: 2023-01-19 | End: 2023-01-24

## 2023-01-19 RX ORDER — HEPARIN 100 UNIT/ML
10 SYRINGE INTRAVENOUS
Status: COMPLETED | OUTPATIENT
Start: 2023-01-19 | End: 2023-01-19

## 2023-01-19 RX ADMIN — HEPARIN 10 UNITS: 100 SYRINGE at 12:01

## 2023-01-19 RX ADMIN — PROMETHAZINE HYDROCHLORIDE 25 MG: 25 TABLET ORAL at 07:01

## 2023-01-19 RX ADMIN — SODIUM CHLORIDE 1000 ML: 9 INJECTION, SOLUTION INTRAVENOUS at 08:01

## 2023-01-19 RX ADMIN — HYDROMORPHONE HYDROCHLORIDE 1 MG: 1 INJECTION, SOLUTION INTRAMUSCULAR; INTRAVENOUS; SUBCUTANEOUS at 09:01

## 2023-01-19 RX ADMIN — SODIUM CHLORIDE 1000 ML: 9 INJECTION, SOLUTION INTRAVENOUS at 09:01

## 2023-01-19 RX ADMIN — LIDOCAINE 1 PATCH: 50 PATCH CUTANEOUS at 11:01

## 2023-01-19 RX ADMIN — METHOCARBAMOL 1000 MG: 500 TABLET ORAL at 11:01

## 2023-01-19 RX ADMIN — KETOROLAC TROMETHAMINE 10 MG: 30 INJECTION, SOLUTION INTRAMUSCULAR at 08:01

## 2023-01-19 NOTE — ED NOTES
Patient identifiers verified and correct for  Ms Salgado  C/C:  Flank pain SEE NN  APPEARANCE: awake and alert in NAD. PAIN  10/10  SKIN: warm, dry and intact. No breakdown or bruising.  MUSCULOSKELETAL: Patient moving all extremities spontaneously, no obvious swelling or deformities noted. Ambulates independently.  RESPIRATORY: Denies shortness of breath.Respirations unlabored.   CARDIAC: Denies CP, 2+ distal pulses; no peripheral edema  ABDOMEN: reports pain to left lower back radaiting to fron evelyn,   : voids spontaneously, denies difficulty  Neurologic: AAO x 4; follows commands equal strength in all extremities; denies numbness/tingling. Denies dizziness  Denies new weknaess

## 2023-01-19 NOTE — Clinical Note
"Ivy Kisermarkel Salgado was seen and treated in our emergency department on 1/19/2023.  She may return to work on 01/21/2023.       If you have any questions or concerns, please don't hesitate to call.      Matt Mazariegos RN    "

## 2023-01-19 NOTE — ED PROVIDER NOTES
Encounter Date: 1/19/2023       History     Chief Complaint   Patient presents with    Flank Pain    Abdominal Pain     History of kidney stones      42-year-old female with history of Crohn's with ostomy, kidney stones, hypertension, anemia presents to the ED complaining of left flank pain that started this morning around 6:00a.  She does report a history of kidney stones requiring ureteral stent placement.  She reports associated chills, nausea without vomiting, urinary frequency.  She is not taken any over-the-counter pain medication prior to arrival.  No falls or rashes.  She denies fever, melena, bloody stools, dysuria, urgency, hematuria, headache, lightheadedness.    The history is provided by the patient.   Review of patient's allergies indicates:   Allergen Reactions    Azathioprine sodium Other (See Comments)     Other reaction(s): pancreatitis  Other reaction(s): pancreatitis    Methotrexate analogues Other (See Comments)     leukopenia    Stelara [ustekinumab] Other (See Comments)     Multiple infections    Zofran [ondansetron hcl (pf)] Other (See Comments)     Per patient causes prolong QT    Vancomycin analogues Hives    Azathioprine      Other reaction(s): Unknown    Methotrexate      Other reaction(s): infection-    Morphine Itching and Other (See Comments)     Other reaction(s): Itching    Zofran [ondansetron hcl]      Other reaction(s): Hives    Bactrim [sulfamethoxazole-trimethoprim] Palpitations    Ciprofloxacin Palpitations     Past Medical History:   Diagnosis Date    Abnormal Pap smear 2007    Abnormal Pap smear 5/26/2011    Anemia     Anxiety     Arthritis     C. difficile diarrhea     Crohn's disease     Depression 8/5/2017    Encounter for blood transfusion     Genital HSV     History of colposcopy with cervical biopsy 2007 and 7/2011 2007-LYLA I  and 7/2011- LYLA I    Hypertension     Kidney stone     Kidney stone     Melanoma     Recurrent UTI 4/3/2013    S/P ileostomy 7/9/2012     Sterilization 6/23/2012     Past Surgical History:   Procedure Laterality Date    ABDOMINAL SURGERY      APPENDECTOMY      BILATERAL SALPINGO-OOPHORECTOMY (BSO) Bilateral 05/30/2019    Procedure: SALPINGO-OOPHORECTOMY, BILATERAL;  Surgeon: Rupa German MD;  Location: [facility] OR [address] [place] [address];  Service: OB/GYN;  Laterality: Bilateral;    BLADDER SURGERY      partial cystectomy due to fistula    breast lift      BREAST SURGERY      [facility]      COLON SURGERY      COLONOSCOPY      CYSTOSCOPY  09/23/2020    Procedure: CYSTOSCOPY;  Surgeon: Sascha Florentino MD;  Location: [facility] OR [address] [place] [address];  Service: Urology;;    CYSTOSCOPY W/ URETERAL STENT PLACEMENT Left 09/15/2020    Procedure: CYSTOSCOPY, WITH URETERAL STENT INSERTION;  Surgeon: Sascha Florentino MD;  Location: [facility] OR [address] [place] [address];  Service: Urology;  Laterality: Left;    DIAGNOSTIC LAPAROSCOPY N/A 07/09/2020    Procedure: LAPAROSCOPY, DIAGNOSTIC;  Surgeon: Gilson El MD;  Location: [facility];  Service: OB/GYN;  Laterality: N/A;    EXCISION OF MELANOMA  07/17/2019    ILEOSTOMY      LAPAROSCOPIC LYSIS OF ADHESIONS N/A 07/09/2020    Procedure: LYSIS, ADHESIONS, LAPAROSCOPIC;  Surgeon: Gilson El MD;  Location: [facility] [place];  Service: OB/GYN;  Laterality: N/A;    LASER LITHOTRIPSY  09/23/2020    Procedure: LITHOTRIPSY, USING LASER;  Surgeon: Sascha Florentino MD;  Location: [facility] OR [address] [place] [address];  Service: Urology;;    LYSIS OF ADHESIONS N/A 05/30/2019    Procedure: LYSIS, ADHESIONS;  Surgeon: Rupa German MD;  Location: [facility] OR [address] [place] [address];  Service: OB/GYN;  Laterality: N/A;    OOPHORECTOMY Right 04/16/2015    PORTACATH PLACEMENT  02/21/2017    SKIN BIOPSY      SMALL INTESTINE SURGERY      age 18 Y    TOTAL ABDOMINAL HYSTERECTOMY  04/16/2015    TOTAL COLECTOMY      TUBAL LIGATION  06/06/2012    UPPER GASTROINTESTINAL ENDOSCOPY      URETEROSCOPIC REMOVAL OF URETERIC CALCULUS  09/23/2020    Procedure: REMOVAL, CALCULUS, URETER, URETEROSCOPIC;  Surgeon: Sascha Florentino,  MD;  Location: [facility] OR [address] [place] [address];  Service: Urology;;    URETEROSCOPY Left 09/23/2020    Procedure: URETEROSCOPY;  Surgeon: Sascha Florentino MD;  Location: [facility] OR [address] [place] [address];  Service: Urology;  Laterality: Left;     Family History   Problem Relation Age of Onset    Colon cancer Father     Cancer Father         Colon    Liver cancer Father     Hyperlipidemia Father     Hypertension Mother     Cancer Maternal Grandfather         Skin    Skin cancer Maternal Grandfather     Diabetes Maternal Grandfather     Heart disease Maternal Grandfather     Endometrial cancer Maternal Aunt     Crohn's disease Brother     Diabetes Paternal Grandfather     Hearing loss Paternal Grandmother     Breast cancer Neg Hx     Ovarian cancer Neg Hx     Melanoma Neg Hx      Social History     Tobacco Use    Smoking status: Never    Smokeless tobacco: Never   Substance Use Topics    Alcohol use: Not Currently     Alcohol/week: 0.0 standard drinks    Drug use: No     Review of Systems   Constitutional:  Positive for chills. Negative for fever.   HENT:  Negative for congestion and sore throat.    Respiratory:  Negative for cough and shortness of breath.    Cardiovascular:  Negative for chest pain.   Gastrointestinal:  Positive for abdominal pain and nausea. Negative for constipation, diarrhea and vomiting.   Genitourinary:  Positive for flank pain and frequency. Negative for dysuria and hematuria.   Musculoskeletal:  Negative for back pain.   Skin:  Negative for rash.   Neurological:  Negative for weakness and headaches.   Psychiatric/Behavioral:  Negative for confusion.      Physical Exam     Initial Vitals [01/19/23 0736]   BP Pulse Resp Temp SpO2   (!) 135/96 76 16 98.4 °F (36.9 °C) 100 %      MAP       --         Physical Exam    Nursing note and vitals reviewed.  Constitutional: She appears well-developed and well-nourished. She is not diaphoretic. No distress.   HENT:   Head: Normocephalic and atraumatic.   Neck: Neck supple.   Normal  range of motion.  Cardiovascular:  Normal rate, regular rhythm and normal heart sounds.     Exam reveals no gallop and no friction rub.       No murmur heard.  Pulmonary/Chest: Breath sounds normal. She has no wheezes. She has no rhonchi. She has no rales.   Abdominal: Abdomen is soft. Bowel sounds are normal. There is no abdominal tenderness.   Ostomy noted to the R abdomen   No right CVA tenderness.  No left CVA tenderness. There is no rebound and no guarding.   Musculoskeletal:         General: Normal range of motion.      Cervical back: Normal range of motion and neck supple.      Comments: No midline back tenderness     Neurological: She is alert and oriented to person, place, and time.   Skin: Skin is warm and dry. No rash noted. No erythema.   Psychiatric: She has a normal mood and affect.       ED Course   Procedures  Labs Reviewed   COMPREHENSIVE METABOLIC PANEL - Abnormal; Notable for the following components:       Result Value    Sodium 135 (*)     Glucose 116 (*)     Anion Gap 7 (*)     All other components within normal limits   CBC W/ AUTO DIFFERENTIAL   URINALYSIS, REFLEX TO URINE CULTURE    Narrative:     Specimen Source->Urine   SEDIMENTATION RATE   C-REACTIVE PROTEIN          Imaging Results              CT Renal Stone Study ABD Pelvis WO (Final result)  Result time 01/19/23 08:55:23      Final result by Paul Bernard Jr., MD (01/19/23 08:55:23)                   Impression:      There are multiple nonobstructing intrarenal stones bilaterally the largest about 4 mm on the right.  No convincing hydronephrosis.  Ureters are difficult to follow to the bladder.    Hepatomegaly.    Additional findings above.      Electronically signed by: Paul Bernard MD  Date:    01/19/2023  Time:    08:55               Narrative:    EXAMINATION:  CT RENAL STONE STUDY ABD PELVIS WO    CLINICAL HISTORY:  Flank pain, kidney stone suspected;    TECHNIQUE:  Low dose axial images, sagittal and coronal reformations were  obtained from the lung bases to the pubic symphysis.  Contrast was not administered.    COMPARISON:  March 23, 2022    FINDINGS:  In the chest, no significant pleural or pericardial fluid.  Air in the distal esophagus either reflux or dysmotility.  Lung bases are clear.  Linear band likely fibrosis left base similar.    The abdomen, liver is upper limits for normal in size 17 cm.  No focal mass.  Gallbladder not distended.  No convincing intrahepatic biliary dilatation.  Pancreas and spleen are normal.  No adrenal masses.  There are multiple calcifications in the kidneys bilaterally.  The largest on the right about 4 mm in the upper pole.  The largest on the left 3 mm in the mid 3rd of the kidney.  No hydronephrosis.  Ureters are difficult to follow to the bladder.  Bladder not distended.    Aorta tapers normally.  No significant para-aortic nor pelvic adenopathy.  Uterus not confidently visualized and may have been removed.  Right lower quadrant ostomy noted.  No focal bowel dilatation.  Presacral soft tissue stranding similar.  2.4 cm fat density just anterior to the coccyx unchanged.    Subcutaneous tissues demonstrate the right lower quadrant ostomy.  Heterogeneous calcific density right buttock either posttraumatic or related to prior injections though similar to March 2022.    Bones are well mineralized.  Alignment appears satisfactory.  No bony destruction.                                       Medications   sodium chloride 0.9% bolus 1,000 mL 1,000 mL (0 mLs Intravenous Stopped 1/19/23 0922)   ketorolac injection 9.999 mg (9.999 mg Intravenous Given 1/19/23 0802)   promethazine tablet 25 mg (25 mg Oral Given 1/19/23 0756)   HYDROmorphone injection 1 mg (1 mg Intravenous Given 1/19/23 0922)   sodium chloride 0.9% bolus 1,000 mL 1,000 mL (0 mLs Intravenous Stopped 1/19/23 1114)   methocarbamoL tablet 1,000 mg (1,000 mg Oral Given 1/19/23 1122)   heparin, porcine (PF) 100 unit/mL injection flush 10 Units (10  Units Intravenous Given 1/19/23 1221)     Medical Decision Making:   History:   Old Medical Records: I decided to obtain old medical records.  Clinical Tests:   Lab Tests: Ordered and Reviewed  Radiological Study: Ordered and Reviewed     APC / Resident Notes:   42-year-old female with history of Crohn's with ostomy, kidney stones, hypertension, anemia presents to the ED complaining of left flank pain that started this morning around 6:00a.  Vital signs stable.  Ostomy noted to the right abdomen.  No CVA tenderness.  Abdomen is nontender.  No rashes.  No midline spinal tenderness.  Differential diagnosis includes but isn't limited to UTI, pyelonephritis, nephrolithiasis, musculoskeletal back pain, Crohn's flare, shingles prodrome.  Will get labs, CT abdomen and pelvis.    UA with no infection or hematuria.  ESR and CRP normal.  No leukocytosis or anemia.  CMP unremarkable.    CT abdomen pelvis with no acute abnormality.    Pain MSK versus shingles prodrome.  No clear etiology of symptoms at this time.  Pain improved with Lidoderm patch, Robaxin. I do not feel that she needs any further labs or imaging at this time. Stable for discharge.     She was discharged with prescriptions for Lidoderm patches, Robaxin.  She will follow up with her PCP.  Strict ED return precautions given.  All of the patient's questions were answered.  I reviewed the patient's chart, labs, and imaging and discussed the case with my supervising physician.                        Clinical Impression:   Final diagnoses:  [R10.9] Left flank pain (Primary)        ED Disposition Condition    Discharge Stable          ED Prescriptions       Medication Sig Dispense Start Date End Date Auth. Provider    LIDOcaine (LIDODERM) 5 % Place 1 patch onto the skin once daily. Remove & Discard patch within 12 hours or as directed by MD 30 patch 1/19/2023 -- Shantel Lee PA-C    methocarbamoL (ROBAXIN) 500 MG Tab Take 2 tablets (1,000 mg total) by mouth 3 (three)  times daily. for 5 days 30 tablet 1/19/2023 1/24/2023 Shantel Lee PA-C          Follow-up Information       Follow up With Specialties Details Why Contact Info    Kalia Astorga MD Internal Medicine   [address] [facility] [address]  [phone]               Shantel Lee PA-C  01/19/23 2492

## 2023-01-20 ENCOUNTER — PATIENT MESSAGE (OUTPATIENT)
Dept: GASTROENTEROLOGY | Facility: CLINIC | Age: 43
End: 2023-01-20
Payer: COMMERCIAL

## 2023-01-22 ENCOUNTER — PATIENT MESSAGE (OUTPATIENT)
Dept: OBSTETRICS AND GYNECOLOGY | Facility: CLINIC | Age: 43
End: 2023-01-22
Payer: COMMERCIAL

## 2023-01-27 ENCOUNTER — PATIENT MESSAGE (OUTPATIENT)
Dept: OBSTETRICS AND GYNECOLOGY | Facility: CLINIC | Age: 43
End: 2023-01-27
Payer: COMMERCIAL

## 2023-01-29 ENCOUNTER — PATIENT MESSAGE (OUTPATIENT)
Dept: INTERNAL MEDICINE | Facility: CLINIC | Age: 43
End: 2023-01-29
Payer: COMMERCIAL

## 2023-01-30 NOTE — TELEPHONE ENCOUNTER
Difficult to say what's happening without seeing her. We can schedule appt to eval or refer to podiatry--her choice.

## 2023-02-02 ENCOUNTER — CLINICAL SUPPORT (OUTPATIENT)
Dept: OBSTETRICS AND GYNECOLOGY | Facility: CLINIC | Age: 43
End: 2023-02-02
Payer: COMMERCIAL

## 2023-02-02 ENCOUNTER — OFFICE VISIT (OUTPATIENT)
Dept: OBSTETRICS AND GYNECOLOGY | Facility: CLINIC | Age: 43
End: 2023-02-02
Payer: COMMERCIAL

## 2023-02-02 VITALS
DIASTOLIC BLOOD PRESSURE: 82 MMHG | HEIGHT: 61 IN | SYSTOLIC BLOOD PRESSURE: 116 MMHG | BODY MASS INDEX: 20.02 KG/M2 | WEIGHT: 106.06 LBS

## 2023-02-02 DIAGNOSIS — N89.8 VAGINAL DISCHARGE: Primary | ICD-10-CM

## 2023-02-02 DIAGNOSIS — Z79.890 HORMONE REPLACEMENT THERAPY (HRT): Primary | ICD-10-CM

## 2023-02-02 PROCEDURE — 96372 THER/PROPH/DIAG INJ SC/IM: CPT | Mod: S$GLB,,, | Performed by: SPECIALIST

## 2023-02-02 PROCEDURE — 3079F PR MOST RECENT DIASTOLIC BLOOD PRESSURE 80-89 MM HG: ICD-10-PCS | Mod: CPTII,S$GLB,, | Performed by: OBSTETRICS & GYNECOLOGY

## 2023-02-02 PROCEDURE — 3008F BODY MASS INDEX DOCD: CPT | Mod: CPTII,S$GLB,, | Performed by: OBSTETRICS & GYNECOLOGY

## 2023-02-02 PROCEDURE — 99213 OFFICE O/P EST LOW 20 MIN: CPT | Mod: S$GLB,,, | Performed by: OBSTETRICS & GYNECOLOGY

## 2023-02-02 PROCEDURE — 3074F PR MOST RECENT SYSTOLIC BLOOD PRESSURE < 130 MM HG: ICD-10-PCS | Mod: CPTII,S$GLB,, | Performed by: OBSTETRICS & GYNECOLOGY

## 2023-02-02 PROCEDURE — 99213 PR OFFICE/OUTPT VISIT, EST, LEVL III, 20-29 MIN: ICD-10-PCS | Mod: S$GLB,,, | Performed by: OBSTETRICS & GYNECOLOGY

## 2023-02-02 PROCEDURE — 1159F MED LIST DOCD IN RCRD: CPT | Mod: CPTII,S$GLB,, | Performed by: OBSTETRICS & GYNECOLOGY

## 2023-02-02 PROCEDURE — 3079F DIAST BP 80-89 MM HG: CPT | Mod: CPTII,S$GLB,, | Performed by: OBSTETRICS & GYNECOLOGY

## 2023-02-02 PROCEDURE — 99999 PR PBB SHADOW E&M-EST. PATIENT-LVL I: ICD-10-PCS | Mod: PBBFAC,,,

## 2023-02-02 PROCEDURE — 99999 PR PBB SHADOW E&M-EST. PATIENT-LVL I: CPT | Mod: PBBFAC,,,

## 2023-02-02 PROCEDURE — 3008F PR BODY MASS INDEX (BMI) DOCUMENTED: ICD-10-PCS | Mod: CPTII,S$GLB,, | Performed by: OBSTETRICS & GYNECOLOGY

## 2023-02-02 PROCEDURE — 81514 NFCT DS BV&VAGINITIS DNA ALG: CPT | Performed by: OBSTETRICS & GYNECOLOGY

## 2023-02-02 PROCEDURE — 99999 PR PBB SHADOW E&M-EST. PATIENT-LVL IV: ICD-10-PCS | Mod: PBBFAC,,, | Performed by: OBSTETRICS & GYNECOLOGY

## 2023-02-02 PROCEDURE — 3074F SYST BP LT 130 MM HG: CPT | Mod: CPTII,S$GLB,, | Performed by: OBSTETRICS & GYNECOLOGY

## 2023-02-02 PROCEDURE — 99999 PR PBB SHADOW E&M-EST. PATIENT-LVL IV: CPT | Mod: PBBFAC,,, | Performed by: OBSTETRICS & GYNECOLOGY

## 2023-02-02 PROCEDURE — 96372 PR INJECTION,THERAP/PROPH/DIAG2ST, IM OR SUBCUT: ICD-10-PCS | Mod: S$GLB,,, | Performed by: SPECIALIST

## 2023-02-02 PROCEDURE — 1159F PR MEDICATION LIST DOCUMENTED IN MEDICAL RECORD: ICD-10-PCS | Mod: CPTII,S$GLB,, | Performed by: OBSTETRICS & GYNECOLOGY

## 2023-02-02 RX ADMIN — ESTRADIOL VALERATE 20 MG: 40 INJECTION INTRAMUSCULAR at 11:02

## 2023-02-02 NOTE — PROGRESS NOTES
Here for hormone therapy injection, no complaints at this time. Injection given as ordered, tolerated well no reports of pain prior to or post injection. Advised to return to clinic as scheduled      Site:linda  delestrogen: 20 mg       CLINIC SUPPLIED MEDICATION

## 2023-02-02 NOTE — PROGRESS NOTES
PT Vaginal D/C for 2.  NO Odor.  WHITE Color.  MIN Vulvitis.  Tx with previous DIFLUCAN.  NO Pelvic pain.  HAS RECURRENT YEAST AND BV    ROS:  GENERAL: No fever, chills, fatigability or weight loss.  VULVAR: No pain, no lesions and no itching.  VAGINAL: No relaxation, no itching, no discharge, no abnormal bleeding and no lesions.  ABDOMEN: No abdominal pain. Denies nausea. Denies vomiting. No diarrhea. No constipation  BREAST: Denies pain. No lumps. No discharge.  URINARY: No incontinence, no nocturia, no frequency and no dysuria.  CARDIOVASCULAR: No chest pain. No shortness of breath. No leg cramps.  NEUROLOGICAL: No headaches. No vision changes.  The remainder of the review of systems was negative.    PE:   General Appearance: normal weight Well developed. Well nourished. In no acute distress.  Urethral Meatus: Normal size. Normal location. No lesions. No prolapse.  Vulva: Atrophic. Lesions: No.  Urethra: No masses. No tenderness. No prolapse. No scarring.  Bladder: No masses. No tenderness.  Vagina: Mucosa NI: yes Discharge: no Atrophic: no Rectocele: no Cystocele: no Vaginal cuff intact: yes  Cervix: Absent.  PROCEDURES:    DIAGNOSIS:  1. Vaginal discharge        PLAN:     MEDICATIONS & ORDERS:  Orders Placed This Encounter    Vaginosis Screen by DNA Probe    boric acid (BORIC ACID) vaginal suppository         FOLLOW-UP: With me LEDA Moraes Jr, MD, FACOG

## 2023-02-10 LAB
BACTERIAL VAGINOSIS DNA: NEGATIVE
CANDIDA GLABRATA DNA: NEGATIVE
CANDIDA KRUSEI DNA: NEGATIVE
CANDIDA RRNA VAG QL PROBE: NEGATIVE
T VAGINALIS RRNA GENITAL QL PROBE: NEGATIVE

## 2023-02-17 ENCOUNTER — OFFICE VISIT (OUTPATIENT)
Dept: SPORTS MEDICINE | Facility: CLINIC | Age: 43
End: 2023-02-17
Payer: COMMERCIAL

## 2023-02-17 ENCOUNTER — HOSPITAL ENCOUNTER (OUTPATIENT)
Dept: RADIOLOGY | Facility: HOSPITAL | Age: 43
Discharge: HOME | End: 2023-02-17
Attending: PHYSICIAN ASSISTANT
Payer: COMMERCIAL

## 2023-02-17 VITALS
DIASTOLIC BLOOD PRESSURE: 67 MMHG | SYSTOLIC BLOOD PRESSURE: 107 MMHG | WEIGHT: 106 LBS | HEART RATE: 55 BPM | HEIGHT: 61 IN | BODY MASS INDEX: 20.01 KG/M2

## 2023-02-17 DIAGNOSIS — M25.512 CHRONIC LEFT SHOULDER PAIN: Primary | ICD-10-CM

## 2023-02-17 DIAGNOSIS — M25.511 ACUTE PAIN OF RIGHT SHOULDER: ICD-10-CM

## 2023-02-17 DIAGNOSIS — S93.402A SPRAIN OF LEFT ANKLE, UNSPECIFIED LIGAMENT, INITIAL ENCOUNTER: ICD-10-CM

## 2023-02-17 DIAGNOSIS — M87.019 AVASCULAR NECROSIS OF BONE OF SHOULDER: ICD-10-CM

## 2023-02-17 DIAGNOSIS — G89.29 CHRONIC LEFT SHOULDER PAIN: Primary | ICD-10-CM

## 2023-02-17 DIAGNOSIS — M25.512 LEFT SHOULDER PAIN, UNSPECIFIED CHRONICITY: ICD-10-CM

## 2023-02-17 PROCEDURE — 99999 PR PBB SHADOW E&M-EST. PATIENT-LVL V: ICD-10-PCS | Mod: PBBFAC,,, | Performed by: PHYSICIAN ASSISTANT

## 2023-02-17 PROCEDURE — 3074F PR MOST RECENT SYSTOLIC BLOOD PRESSURE < 130 MM HG: ICD-10-PCS | Mod: CPTII,S$GLB,, | Performed by: PHYSICIAN ASSISTANT

## 2023-02-17 PROCEDURE — 20610 DRAIN/INJ JOINT/BURSA W/O US: CPT | Mod: LT,S$GLB,, | Performed by: PHYSICIAN ASSISTANT

## 2023-02-17 PROCEDURE — 3008F BODY MASS INDEX DOCD: CPT | Mod: CPTII,S$GLB,, | Performed by: PHYSICIAN ASSISTANT

## 2023-02-17 PROCEDURE — 20610 PR DRAIN/INJECT LARGE JOINT/BURSA: ICD-10-PCS | Mod: LT,S$GLB,, | Performed by: PHYSICIAN ASSISTANT

## 2023-02-17 PROCEDURE — 20610 DRAIN/INJ JOINT/BURSA W/O US: CPT | Mod: PBBFAC | Performed by: PHYSICIAN ASSISTANT

## 2023-02-17 PROCEDURE — 73030 X-RAY EXAM OF SHOULDER: CPT | Mod: 26,50,, | Performed by: RADIOLOGY

## 2023-02-17 PROCEDURE — 99215 OFFICE O/P EST HI 40 MIN: CPT | Mod: PBBFAC,25 | Performed by: PHYSICIAN ASSISTANT

## 2023-02-17 PROCEDURE — 1159F PR MEDICATION LIST DOCUMENTED IN MEDICAL RECORD: ICD-10-PCS | Mod: CPTII,S$GLB,, | Performed by: PHYSICIAN ASSISTANT

## 2023-02-17 PROCEDURE — 99214 PR OFFICE/OUTPT VISIT, EST, LEVL IV, 30-39 MIN: ICD-10-PCS | Mod: 25,S$GLB,, | Performed by: PHYSICIAN ASSISTANT

## 2023-02-17 PROCEDURE — 3078F DIAST BP <80 MM HG: CPT | Mod: CPTII,S$GLB,, | Performed by: PHYSICIAN ASSISTANT

## 2023-02-17 PROCEDURE — 73030 X-RAY EXAM OF SHOULDER: CPT | Mod: TC,50

## 2023-02-17 PROCEDURE — 99999 PR PBB SHADOW E&M-EST. PATIENT-LVL V: CPT | Mod: PBBFAC,,, | Performed by: PHYSICIAN ASSISTANT

## 2023-02-17 PROCEDURE — 99214 OFFICE O/P EST MOD 30 MIN: CPT | Mod: 25,S$GLB,, | Performed by: PHYSICIAN ASSISTANT

## 2023-02-17 PROCEDURE — 3078F PR MOST RECENT DIASTOLIC BLOOD PRESSURE < 80 MM HG: ICD-10-PCS | Mod: CPTII,S$GLB,, | Performed by: PHYSICIAN ASSISTANT

## 2023-02-17 PROCEDURE — 3008F PR BODY MASS INDEX (BMI) DOCUMENTED: ICD-10-PCS | Mod: CPTII,S$GLB,, | Performed by: PHYSICIAN ASSISTANT

## 2023-02-17 PROCEDURE — 3074F SYST BP LT 130 MM HG: CPT | Mod: CPTII,S$GLB,, | Performed by: PHYSICIAN ASSISTANT

## 2023-02-17 PROCEDURE — 1160F PR REVIEW ALL MEDS BY PRESCRIBER/CLIN PHARMACIST DOCUMENTED: ICD-10-PCS | Mod: CPTII,S$GLB,, | Performed by: PHYSICIAN ASSISTANT

## 2023-02-17 PROCEDURE — 1160F RVW MEDS BY RX/DR IN RCRD: CPT | Mod: CPTII,S$GLB,, | Performed by: PHYSICIAN ASSISTANT

## 2023-02-17 PROCEDURE — 1159F MED LIST DOCD IN RCRD: CPT | Mod: CPTII,S$GLB,, | Performed by: PHYSICIAN ASSISTANT

## 2023-02-17 PROCEDURE — 73030 XR SHOULDER COMPLETE 2 OR MORE VIEWS BILATERAL: ICD-10-PCS | Mod: 26,50,, | Performed by: RADIOLOGY

## 2023-02-17 RX ORDER — TRIAMCINOLONE ACETONIDE 40 MG/ML
80 INJECTION, SUSPENSION INTRA-ARTICULAR; INTRAMUSCULAR
Status: COMPLETED | OUTPATIENT
Start: 2023-02-17 | End: 2023-02-17

## 2023-02-17 RX ADMIN — TRIAMCINOLONE ACETONIDE 80 MG: 40 INJECTION, SUSPENSION INTRA-ARTICULAR; INTRAMUSCULAR at 01:02

## 2023-02-17 NOTE — PROGRESS NOTES
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       Left shoulder pain began 4+ years ago without injury or trauma and is located of the posterior superior shoulder and is constant. Pain mostly occurs with movement of her shoulder and activity. She previously had a MRI of the shoulder which showed a small area of AVN of the superior articular margin of the humerus. She has been receiving yearly left shoulder CSI with good pain relief. She has tried over the counter medicine over that time with no pain relief. She is requesting a repeat shoulder injections today.     Right shoulder is mild pain that is intermittent and only bothers her some days.      She has a history of Crohn's disease and reports that she has not been on steroid in a long time (years).      She reports that the pain is worse with overhead activity. It also bothers her at night.     Is affecting ADLs.   RHD    She also reports and inversion injury to her left ankle when stepping off a curb about 2 weeks ago. She reports continued anterior ankle pain that is worse with working, standing, and walking a lot. Ankle swells still with extended activity.         Review of Systems   Constitution: Negative. Negative for chills, fever and night sweats.   HENT: Negative for congestion and headaches.    Eyes: Negative for blurred vision, left vision loss and right vision loss.   Cardiovascular: Negative for chest pain and syncope.   Respiratory: Negative for cough and shortness of breath.    Endocrine: Negative for polydipsia, polyphagia and polyuria.   Hematologic/Lymphatic: Negative for bleeding problem. Does not bruise/bleed easily.   Skin: Negative for dry skin, itching and rash.   Musculoskeletal: Negative for falls and muscle weakness.    Gastrointestinal: Negative for abdominal pain and bowel incontinence.   Genitourinary: Negative for bladder incontinence and nocturia.   Neurological: Negative for disturbances in coordination, loss of balance and seizures.   Psychiatric/Behavioral: Negative for depression. The patient does not have insomnia.    Allergic/Immunologic: Negative for hives and persistent infections.      PAST MEDICAL HISTORY:        Past Medical History:   Diagnosis Date    Abnormal Pap smear 2007    Abnormal Pap smear 5/26/2011    Anemia      Anxiety      Arthritis      C. difficile diarrhea      Crohn's disease      Depression 8/5/2017    Encounter for blood transfusion      Genital HSV      History of colposcopy with cervical biopsy 2007 and 7/2011 2007-LYLA I  and 7/2011- LYLA I    Hypertension      Kidney stone      Kidney stone      Melanoma      Recurrent UTI 4/3/2013    S/P ileostomy 7/9/2012    Sterilization 6/23/2012      PAST SURGICAL HISTORY:         Past Surgical History:   Procedure Laterality Date    ABDOMINAL SURGERY        APPENDECTOMY        BILATERAL SALPINGO-OOPHORECTOMY (BSO) Bilateral 5/30/2019     Procedure: SALPINGO-OOPHORECTOMY, BILATERAL;  Surgeon: Rupa German MD;  Location: [address];  Service: OB/GYN;  Laterality: Bilateral;    BLADDER SURGERY         partial cystectomy due to fistula    [facility]        COLON SURGERY        COLONOSCOPY        CYSTOSCOPY   9/23/2020     Procedure: CYSTOSCOPY;  Surgeon: Sascha Florentino MD;  Location: [address];  Service: Urology;;    CYSTOSCOPY W/ URETERAL STENT PLACEMENT Left 9/15/2020     Procedure: CYSTOSCOPY, WITH URETERAL STENT INSERTION;  Surgeon: Sascha Florentino MD;  Location: [address];  Service: Urology;  Laterality: Left;    DIAGNOSTIC LAPAROSCOPY N/A 7/9/2020     Procedure: LAPAROSCOPY, DIAGNOSTIC;  Surgeon: Gilson El MD;  Location: [facility];  Service: OB/GYN;  Laterality: N/A;    EXCISION OF MELANOMA   07/17/2019    ILEOSTOMY         LAPAROSCOPIC LYSIS OF ADHESIONS N/A 7/9/2020     Procedure: LYSIS, ADHESIONS, LAPAROSCOPIC;  Surgeon: Gilson El MD;  Location: [facility] OR;  Service: OB/GYN;  Laterality: N/A;    LASER LITHOTRIPSY   9/23/2020     Procedure: LITHOTRIPSY, USING LASER;  Surgeon: Sascha Florentino MD;  Location: [facility] OR 1ST FLR;  Service: Urology;;    LYSIS OF ADHESIONS N/A 5/30/2019     Procedure: LYSIS, ADHESIONS;  Surgeon: Rupa German MD;  Location: [facility] OR 2ND FLR;  Service: OB/GYN;  Laterality: N/A;    OOPHORECTOMY Right 04/16/2015    PORTACATH PLACEMENT   02/21/2017    SKIN BIOPSY        SMALL INTESTINE SURGERY         age 18 Y    TOTAL ABDOMINAL HYSTERECTOMY   04/16/2015    TOTAL COLECTOMY        TUBAL LIGATION   06/06/2012    UPPER GASTROINTESTINAL ENDOSCOPY        URETEROSCOPIC REMOVAL OF URETERIC CALCULUS   9/23/2020     Procedure: REMOVAL, CALCULUS, URETER, URETEROSCOPIC;  Surgeon: Sascha Florentino MD;  Location: [facility] OR 1ST FLR;  Service: Urology;;    URETEROSCOPY Left 9/23/2020     Procedure: URETEROSCOPY;  Surgeon: Sascha Florentino MD;  Location: [facility] OR 1ST FLR;  Service: Urology;  Laterality: Left;      FAMILY HISTORY:         Family History   Problem Relation Age of Onset    Colon cancer Father      Cancer Father           colon cancer    Liver cancer Father      Hypertension Mother      Cancer Maternal Grandfather           esophageal      Skin cancer Maternal Grandfather      Endometrial cancer Maternal Aunt      Crohn's disease Brother      Breast cancer Neg Hx      Ovarian cancer Neg Hx      Melanoma Neg Hx        SOCIAL HISTORY:   Social History               Socioeconomic History    Marital status: Single       Spouse name: Not on file    Number of children: Not on file    Years of education: Not on file    Highest education level: Not on file   Occupational History       Employer: [employer]   Social Needs    Financial resource strain: Very hard    Food insecurity        Worry: Often true       Inability: Sometimes true    Transportation needs       Medical: No       Non-medical: No   Tobacco Use    Smoking status: Never Smoker    Smokeless tobacco: Never Used   Substance and Sexual Activity    Alcohol use: Not Currently       Alcohol/week: 0.0 standard drinks       Frequency: Never       Drinks per session: Patient refused       Binge frequency: Never    Drug use: No    Sexual activity: Yes       Partners: Male       Birth control/protection: Surgical       Comment: HYST   Lifestyle    Physical activity       Days per week: 2 days       Minutes per session: 10 min    Stress: To some extent   Relationships    Social connections       Talks on phone: Not on file       Gets together: Once a week       Attends Latter day service: Not on file       Active member of club or organization: No       Attends meetings of clubs or organizations: Never       Relationship status: Living with partner   Other Topics Concern    Are you pregnant or think you may be? No    Breast-feeding No   Social History Narrative    Not on file            MEDICATIONS:   Current Outpatient Medications:     clonazePAM (KLONOPIN) 1 MG tablet, TAKE 1 AND 1/2 TABLETS BY MOUTH TWICE DAILY AS NEEDED FOR ANXIETY, Disp: 90 tablet, Rfl: 0    diphenoxylate-atropine 2.5-0.025 mg (LOMOTIL) 2.5-0.025 mg per tablet, Take 1 tablet by mouth 4 (four) times daily as needed. for diarrhea, Disp: 100 tablet, Rfl: 0    dronabinoL (MARINOL) 5 MG capsule, Take 1 capsule (5 mg total) by mouth 2 (two) times daily before meals., Disp: 60 capsule, Rfl: 0    famotidine (PEPCID) 20 MG tablet, Take 20 mg by mouth 2 (two) times daily., Disp: , Rfl:     fluconazole (DIFLUCAN) 150 MG Tab, Take 1 tablet (150 mg total) by mouth every 72 hours as needed., Disp: 3 tablet, Rfl: 0    food supplemt, lactose-reduced (BOOST BREEZE NUTRITIONAL) 0.04-1.05 gram-kcal/mL Liqd, Use 3 times per day, Disp: 6399 mL, Rfl: 5    gabapentin (NEURONTIN) 300 MG capsule,  TAKE ONE CAPSULE BY MOUTH ONCE DAILY. AFTER 2 WEEKS, TAKE 1 CAPSULE TWICE DAILY, Disp: 60 capsule, Rfl: 11    loperamide (IMODIUM) 2 mg capsule, Take 2 mg by mouth daily as needed for Diarrhea., Disp: , Rfl:     mirtazapine (REMERON SOL-TAB) 30 MG disintegrating tablet, Take 1 tablet (30 mg total) by mouth nightly., Disp: 90 tablet, Rfl: 0    multivitamin (ONE DAILY MULTIVITAMIN) per tablet, Take 1 tablet by mouth once daily., Disp: , Rfl:     oxyCODONE-acetaminophen (PERCOCET) 7.5-325 mg per tablet, Take 1 tablet by mouth every 6 (six) hours as needed for Pain., Disp: 12 tablet, Rfl: 0    promethazine (PHENERGAN) 25 MG tablet, TAKE 1 TABLET(25 MG) BY MOUTH EVERY 6 HOURS AS NEEDED, Disp: 30 tablet, Rfl: 0    sumatriptan (IMITREX) 50 MG tablet, Take 1 tab po prn migraine; may repeat once in 2 hours prn; do not exceed 2 tabs in 24 hours, Disp: 12 tablet, Rfl: 0    valACYclovir (VALTREX) 500 MG tablet, TAKE 1 TABLET BY MOUTH EVERY DAY, Disp: 90 tablet, Rfl: 2    vedolizumab (ENTYVIO) 300 mg SolR injection, Inject 300 mg into the vein., Disp: , Rfl:     zolpidem (AMBIEN) 10 mg Tab, TAKE 1 TABLET BY MOUTH EVERY NIGHT AT BEDTIME, Disp: 30 tablet, Rfl: 1    potassium citrate (UROCIT-K 15) 15 mEq TbSR, Take 2 tablets by mouth 2 (two) times daily., Disp: 360 tablet, Rfl: 3  No current facility-administered medications for this visit.   ALLERGIES:         Review of patient's allergies indicates:   Allergen Reactions    Azathioprine sodium Other (See Comments)       Other reaction(s): pancreatitis  Other reaction(s): pancreatitis    Methotrexate analogues Other (See Comments)       leukopenia    Stelara [ustekinumab] Other (See Comments)       Multiple infections    Zofran [ondansetron hcl (pf)] Other (See Comments)       Per patient causes prolong QT    Vancomycin analogues Hives    Morphine Itching and Other (See Comments)       Other reaction(s): Itching    Bactrim [sulfamethoxazole-trimethoprim] Palpitations     "Ciprofloxacin Palpitations         VITAL SIGNS: /82   Pulse 85   Ht 5' 1" (1.549 m)   Wt 56.7 kg (125 lb)   LMP 03/30/2015   BMI 23.62 kg/m²       PHYSICAL EXAMINATION:  General:  The patient is alert and oriented x 3.  Mood is pleasant.  Observation of ears, eyes and nose reveal no gross abnormalities.  No labored breathing observed.  Gait is coordinated. Patient can toe walk and heel walk without difficulty.        Bilateral SHOULDER / UPPER EXTREMITY EXAM       OBSERVATION:    Swelling                       none                Deformity                     none  Discoloration               none                Scapular winging        none  Scars                           none                Atrophy                        none     TENDERNESS / CREPITUS (T/C):                                                    T/C                                                                  T/C  Clavicle                       -/-                     SUPRAspinatus                      -/-                      AC Jt.                          -/-                     INFRAspinatus                        -/-           SC Jt.                          -/-                     Deltoid                                     -/-                                   G. Tuberosity              -/-                     LH BICEP groove                   -/-  Acromion:                    -/-                     Midline Neck                           -/-                       Scapular Spine           -/-                     Trapezium                               -/-  SMA Scapula              -/-                     GH jt. line - post                      + right /-                      Scapulothoracic          -/-                                                 ROM:   (* = with pain)                       Right shoulder                         Left shoulder                                                   AROM (PROM)                      "  AROM (PROM)  FE                                            170° (175°)                              160° (165°)  *  ER at 0°                                   60°  (65°)                                 50°  (55°)*  ER at 90° ABD                        90°  (90°)                                 90°  (90°)*  IR at 90°  ABD                        NA  (40°)                                 NA  (40°)           IR (spine level)                        T10                                          T12*     STRENGTH:   (* = with pain)           RIGHT SHOULDER                LEFT SHOULDER  SCAPTION                              5/5                                           5-/5* at 0 and 5/5 at 30            IR                                             5/5                                           5/5  ER                                           5/5                                           5/5  BICEPS                                   5/5                                           5/5  Deltoid                                     5/5                                           5/5    SIGNS:  Painful side                                         NEER                          +  bilateral                                OKHADIJAHS                  + right only         CARTER                   +   right                               SPEEDS                     +       right only                 DROP ARM                 neg                              BELLY PRESS           neg  Superior escape          none                            LIFT-OFF                    neg  X-Body ADD                neg                              MOVING VALGUS      neg                                               STABILITY TESTING    RIGHT SHOULDER                         LEFT SHOULDER    Translation                   Anterior                       up face                                                up face              Posterior                       up face                                                up face              Sulcus                         < 10mm                                               < 10 mm     Signs  Apprehension              neg                                                      neg                                            Relocation                   no change                                           no change                                 Jerk test                      neg                                                      neg     EXTREMITY NEURO-VASCULAR EXAM   Sensation grossly intact to light touch all dermatomal regions.   DTR 2+ Biceps, Triceps, BR and Negative Lillies sign  Grossly intact motor function at Elbow, Wrist and Hand  Distal pulses radial and ulnar 2+, brisk cap refill, symmetric.   NECK:  Painless FROM and spinous processes non-tender. Negative Spurlings sign.       OTHER FINDINGS:  + scapular dyskinesia  Negative bear hug test.      Xrays:  Xrays of the bilateral shoulder 3 views were ordered and reviewed by me today. No fracture, subluxation, or other significant bony or joint abnormality is identified. Slight DJD seen. No signs of AVN or collapse on xray today to humeral head.     Radiology can see some small areas of sclerosis of the superior subacromial margin of the humeral heads bilaterally with no bone collapse.       ASSESSMENT:     1. Shoulder pain, chronic, left   2.   Shoulder pain, acute, right  3.   AVN humeral head left   Scapular dyskinesia        PLAN:    1.PROCEDURE NOTE:   After cortisone consent and post-injection information was given, the shoulder was prepped with betadyne and alcohol. The left subacromial space of the shoulder was injected with 2 cc 40 mg kenalog and 4 cc lidocaine and 4 cc 0.25% marcaine.   Bandaid was applied. Patient was reminded to rest with RICE for 48 hours post injection and to call clinic immediately for any adverse side effects as explained in clinic  today. Patient was warned of possible blood sugar and/or blood pressure changes during that time. Told to call clinic or go to ED if the changes become concerning.        2. Patient declined formal PT in the past despite me discussing the importance of this.      3. Will hold off on CSI to right shoulder     4. RTC as needed.     All patients questions were answered. Patient was advised to call us with any concerns or questions.        Chief Complaint: left ankle pain    42 y.o. Female who reports an injury to ankle, and that the pain is mild to moderate and not responding to any conservative care.  Reports twisting of the ankle in and inversion fashion 2 weeks ago.    She also reports and inversion injury to her left ankle when stepping off a curb about 2 weeks ago. She reports continued anterior ankle pain that is worse with working, standing, and walking a lot. Ankle swells still with extended activity.      Is affecting ADLs.     PAST MEDICAL HISTORY:   Past Medical History:   Diagnosis Date    Abnormal Pap smear 2007    Abnormal Pap smear 5/26/2011    Anemia     Anxiety     Arthritis     C. difficile diarrhea     Crohn's disease     Depression 8/5/2017    Encounter for blood transfusion     Genital HSV     History of colposcopy with cervical biopsy 2007 and 7/2011 2007-LYLA I  and 7/2011- LYLA I    Hypertension     Kidney stone     Kidney stone     Melanoma     Recurrent UTI 4/3/2013    S/P ileostomy 7/9/2012    Sterilization 6/23/2012     PAST SURGICAL HISTORY:   Past Surgical History:   Procedure Laterality Date    ABDOMINAL SURGERY      APPENDECTOMY      BILATERAL SALPINGO-OOPHORECTOMY (BSO) Bilateral 05/30/2019    Procedure: SALPINGO-OOPHORECTOMY, BILATERAL;  Surgeon: Rupa German MD;  Location: [facility] OR [address] [place] [address];  Service: OB/GYN;  Laterality: Bilateral;    BLADDER SURGERY      partial cystectomy due to fistula    breast lift      BREAST SURGERY      [facility]      COLON SURGERY      COLONOSCOPY       CYSTOSCOPY  09/23/2020    Procedure: CYSTOSCOPY;  Surgeon: Sascha Florentino MD;  Location: [facility] OR 1ST FLR;  Service: Urology;;    CYSTOSCOPY W/ URETERAL STENT PLACEMENT Left 09/15/2020    Procedure: CYSTOSCOPY, WITH URETERAL STENT INSERTION;  Surgeon: Sascha Florentino MD;  Location: [facility] OR 1ST FLR;  Service: Urology;  Laterality: Left;    DIAGNOSTIC LAPAROSCOPY N/A 07/09/2020    Procedure: LAPAROSCOPY, DIAGNOSTIC;  Surgeon: Gilson El MD;  Location: [facility] OR;  Service: OB/GYN;  Laterality: N/A;    EXCISION OF MELANOMA  07/17/2019    ILEOSTOMY      LAPAROSCOPIC LYSIS OF ADHESIONS N/A 07/09/2020    Procedure: LYSIS, ADHESIONS, LAPAROSCOPIC;  Surgeon: Gilson El MD;  Location: [facility] OR;  Service: OB/GYN;  Laterality: N/A;    LASER LITHOTRIPSY  09/23/2020    Procedure: LITHOTRIPSY, USING LASER;  Surgeon: Sascha Florentino MD;  Location: [facility] OR 1ST FLR;  Service: Urology;;    LYSIS OF ADHESIONS N/A 05/30/2019    Procedure: LYSIS, ADHESIONS;  Surgeon: Rupa German MD;  Location: [facility] OR 2ND FLR;  Service: OB/GYN;  Laterality: N/A;    OOPHORECTOMY Right 04/16/2015    PORTACATH PLACEMENT  02/21/2017    SKIN BIOPSY      SMALL INTESTINE SURGERY      age 18 Y    TOTAL ABDOMINAL HYSTERECTOMY  04/16/2015    TOTAL COLECTOMY      TUBAL LIGATION  06/06/2012    UPPER GASTROINTESTINAL ENDOSCOPY      URETEROSCOPIC REMOVAL OF URETERIC CALCULUS  09/23/2020    Procedure: REMOVAL, CALCULUS, URETER, URETEROSCOPIC;  Surgeon: Sascha Florentino MD;  Location: [facility] OR 1ST FLR;  Service: Urology;;    URETEROSCOPY Left 09/23/2020    Procedure: URETEROSCOPY;  Surgeon: Sascha Florentino MD;  Location: [facility] OR 1ST FLR;  Service: Urology;  Laterality: Left;     FAMILY HISTORY:   Family History   Problem Relation Age of Onset    Colon cancer Father     Cancer Father         Colon    Liver cancer Father     Hyperlipidemia Father     Hypertension Mother     Cancer Maternal Grandfather         Skin    Skin cancer  Maternal Grandfather     Diabetes Maternal Grandfather     Heart disease Maternal Grandfather     Endometrial cancer Maternal Aunt     Crohn's disease Brother     Diabetes Paternal Grandfather     Hearing loss Paternal Grandmother     Breast cancer Neg Hx     Ovarian cancer Neg Hx     Melanoma Neg Hx      SOCIAL HISTORY:   Social History     Socioeconomic History    Marital status: Single   Occupational History     Employer: [employer]   Tobacco Use    Smoking status: Never    Smokeless tobacco: Never   Substance and Sexual Activity    Alcohol use: Not Currently     Alcohol/week: 0.0 standard drinks    Drug use: No    Sexual activity: Yes     Partners: Male     Birth control/protection: See Surgical Hx     Comment: HYST   Other Topics Concern    Are you pregnant or think you may be? No    Breast-feeding No     Social Determinants of Health     Financial Resource Strain: Medium Risk    Difficulty of Paying Living Expenses: Somewhat hard   Food Insecurity: Food Insecurity Present    Worried About Running Out of Food in the Last Year: Sometimes true    Ran Out of Food in the Last Year: Sometimes true   Transportation Needs: Unknown    Lack of Transportation (Medical): Patient refused    Lack of Transportation (Non-Medical): Patient refused   Physical Activity: Insufficiently Active    Days of Exercise per Week: 2 days    Minutes of Exercise per Session: 30 min   Stress: Stress Concern Present    Feeling of Stress : Rather much   Social Connections: Unknown    Frequency of Communication with Friends and Family: More than three times a week    Frequency of Social Gatherings with Friends and Family: Once a week    Active Member of Clubs or Organizations: No    Attends Club or Organization Meetings: Patient refused    Marital Status: Living with partner   Housing Stability: Low Risk     Unable to Pay for Housing in the Last Year: No    Number of Places Lived in the Last Year: 1    Unstable Housing in the  Last Year: No       MEDICATIONS:   Current Outpatient Medications:     acyclovir 5% (ZOVIRAX) 5 % Crea, Apply topically 5 (five) times daily., Disp: 5 g, Rfl: 1    benzocaine 20 % Oint, Compound benzocaine 20% / lidocaine 6% / tetracaine 4% ointment. Apply to affected area 1 hour prior to procedure., Disp: 30 g, Rfl: 0    boric acid (BORIC ACID) vaginal suppository, Place 1 each (650 mg total) vaginally nightly as needed., Disp: 30 suppository, Rfl: 12    clonazePAM (KLONOPIN) 1 MG tablet, TAKE 1 AND 1/2 TABLETS BY MOUTH TWICE DAILY AS NEEDED FOR ANXIETY, Disp: 75 tablet, Rfl: 0    diphenoxylate-atropine 2.5-0.025 mg (LOMOTIL) 2.5-0.025 mg per tablet, Take 1 tablet by mouth 4 (four) times daily as needed for Diarrhea. for diarrhea, Disp: 100 tablet, Rfl: 0    dronabinoL (MARINOL) 5 MG capsule, TAKE 1 CAPSULE BY MOUTH TWICE DAILY BEFORE MEALS, Disp: 60 capsule, Rfl: 1    fluconazole (DIFLUCAN) 150 MG Tab, Take 1 tablet (150 mg total) by mouth every 72 hours as needed (vaginal yeast)., Disp: 3 tablet, Rfl: 0    gabapentin (NEURONTIN) 300 MG capsule, TAKE ONE CAPSULE BY MOUTH ONCE DAILY. AFTER 2 WEEKS, TAKE 1 CAPSULE TWICE DAILY, Disp: 60 capsule, Rfl: 11    HYDROcodone-acetaminophen (NORCO) 5-325 mg per tablet, Take 1 tablet by mouth every 6 (six) hours as needed for Pain., Disp: 8 tablet, Rfl: 0    LIDOcaine (LIDODERM) 5 %, Place 1 patch onto the skin once daily. Remove & Discard patch within 12 hours or as directed by MD, Disp: 30 patch, Rfl: 0    loperamide (IMODIUM) 2 mg capsule, Take 2 mg by mouth daily as needed for Diarrhea., Disp: , Rfl:     mirtazapine (REMERON SOL-TAB) 30 MG disintegrating tablet, DISSOLVE 1 TABLET(30 MG) ON THE TONGUE EVERY NIGHT, Disp: 90 tablet, Rfl: 2    multivitamin (THERAGRAN) per tablet, Take 1 tablet by mouth once daily., Disp: , Rfl:     nadoloL (CORGARD) 40 MG tablet, Take 1 tablet (40 mg total) by mouth once daily., Disp: 30 tablet, Rfl: 11    naproxen (NAPROSYN) 500 MG tablet,  "TAKE 1 TABLET(500 MG) BY MOUTH TWICE DAILY WITH MEALS, Disp: 60 tablet, Rfl: 0    pantoprazole (PROTONIX) 40 MG tablet, Take 1 tablet (40 mg total) by mouth 2 (two) times daily., Disp: 60 tablet, Rfl: 12    promethazine (PHENERGAN) 25 MG tablet, TAKE 1 TABLET BY MOUTH EVERY 8 HOURS FOR NAUSEA, Disp: 30 tablet, Rfl: 1    sumatriptan (IMITREX) 50 MG tablet, TAKE 1 TABLET BY MOUTH AS NEEDED FOR MIGRAINE. MAY REPEAT ONCE IN 2 HRS. DO NOT EXCEED 2 TABS IN 24 HRS, Disp: 12 tablet, Rfl: 0    valACYclovir (VALTREX) 500 MG tablet, TAKE 1 TABLET(500 MG) BY MOUTH EVERY DAY, Disp: 90 tablet, Rfl: 3    vedolizumab (ENTYVIO) 300 mg SolR injection, Inject 300 mg into the vein., Disp: , Rfl:     zolpidem (AMBIEN) 10 mg Tab, TAKE 1 TABLET BY MOUTH EVERY EVENING, Disp: 30 tablet, Rfl: 0    Current Facility-Administered Medications:     estradiol valerate (DELESTROGEN) injection 20 mg/mL, 20 mg, Intramuscular, Q30 Days, Gilson lE MD, 20 mg at 12/30/22 0902    estradiol valerate injection 20 mg, 20 mg, Intramuscular, Q30 Days, Gilson El MD, 20 mg at 02/02/23 1125  ALLERGIES:   Review of patient's allergies indicates:   Allergen Reactions    Azathioprine sodium Other (See Comments)     Other reaction(s): pancreatitis  Other reaction(s): pancreatitis    Methotrexate analogues Other (See Comments)     leukopenia    Stelara [ustekinumab] Other (See Comments)     Multiple infections    Zofran [ondansetron hcl (pf)] Other (See Comments)     Per patient causes prolong QT    Vancomycin analogues Hives    Azathioprine      Other reaction(s): Unknown    Methotrexate      Other reaction(s): infection-    Morphine Itching and Other (See Comments)     Other reaction(s): Itching    Zofran [ondansetron hcl]      Other reaction(s): Hives    Bactrim [sulfamethoxazole-trimethoprim] Palpitations    Ciprofloxacin Palpitations       VITAL SIGNS: /67   Pulse (!) 55   Ht 5' 1" (1.549 m)   Wt 48.1 kg (106 lb)   LMP 03/30/2015   BMI " 20.03 kg/m²      Review of Systems   Constitution: Negative. Negative for chills, fever and night sweats.   HENT: Negative for congestion and headaches.    Eyes: Negative for blurred vision, left vision loss and right vision loss.   Cardiovascular: Negative for chest pain and syncope.   Respiratory: Negative for cough and shortness of breath.    Endocrine: Negative for polydipsia, polyphagia and polyuria.   Hematologic/Lymphatic: Negative for bleeding problem. Does not bruise/bleed easily.   Skin: Negative for dry skin, itching and rash.   Musculoskeletal: Negative for falls and muscle weakness.   Gastrointestinal: Negative for abdominal pain and bowel incontinence.   Genitourinary: Negative for bladder incontinence and nocturia.   Neurological: Negative for disturbances in coordination, loss of balance and seizures.   Psychiatric/Behavioral: Negative for depression. The patient does not have insomnia.    Allergic/Immunologic: Negative for hives and persistent infections.   All other systems negative.    PHYSICAL EXAMINATION    General:  The patient is alert and oriented x 3.  Mood is pleasant.  Observation of ears, eyes and nose reveal no gross abnormalities.  No labored breathing observed.    left Foot and Ankle Exam    INSPECTION:      ALIGNMENT:  Gait:    Normal   Hindfoot  Normal    Scars:   None    Midfoot: Normal  Swelling:   none    Forefoot: Normal  Color:   Normal      Atrophy:  None    Collective Ankle-Hindfoot Alignment    Heel / Toe Walking: No difficulty   Good -plantigrade (PG), well aligned           [Fair-PG, malaligned, asymptomatic]         [Poor-Non-PG,malaligned, has sxs]     TENDERNESS:  lATERAL:    anterior:  Sinus tarsi:  None  Anteromedial joint line:  none  Syndesmosis:  none  Anterolateral joint line:   none  ATFL:   none  Talonavicular:    none   CFL:   none  Anterior tibialis:   none  Anterolateral gutter: none  Extensor tendons:   none  Fibula:   none  Peroneal  tendons: none  POSTERIOR:  Peroneal tubercle.  None  Medial/lateral achilles:   none       Medial/lateral achilles insertion: none  MEDIAL:      Deltoid:  none  CALCANEUS:  Malleolus:  none  Retrocalcaneal:   none  PTT:   none  Medial achilles:   none  Navicular:  none  Lateral achilles:   none       Calcaneal tuberosity:   none  FOOT:    Calcaneal cuboid  none MT / MT heads:  none   Navicular   none  Medial cord origin PF:  none  Cuneiforms:   none  Web space:   none  Lisfranc    none  Tarsal tunnel:   none  Base of the fifth metatarsal  none Tinels sign   neg        RANGE OF MOTION:  RIGHT/ LEFT   STRENGTH: (affected)  Ankle DF/PF:  15/45  15/45    Anterior tibialis: 5/5     Eversion/Inversion: 15/25 15/25  Posterior tibialis: 5/5   Midfoot ABD/ADD: 10/10 10/10  Gastroc-soleus: 5/5   First MTP DF/PF: 60/25 60/25  Peroneals:  5/5         EHL:   5/5   (* = pain)     FHL:   5/5         (* = pain)      SPECIAL TESTS:   ANKLE INSTABILITY: (*pain)    Anterior drawer:   Grade 2      (C-W contralateral side)     Inversion:   30°     Eversion  10°            Collective Instability: (Ant-post and varus-valgus)     Stable        PROVOCATIVE TESTING:    Forced DF/ER: No pain at syndesmosis.    Mid-leg squeeze  No pain at syndesmosis    Forced DF:  No pain anterior joint line.      Forced PF:  No pain posterior ankle.     Forced INV:  No pain lateral    Forced EV:  No pain medial     Annas sign: Normal ankle plantar flexion.     Resisted peroneal No subluxation or pain    1st-2nd MT toggle No pain at Lisfranc    MT-T torque  No pain at Lisfranc     NEUROLOGIC TESTING:  All dermatomes foot, ankle and leg have normal sensation light touch  Ankle Reflexes 2+, symmetric   Negative Babinski and No Clonus    VASCULAR:  2+ pulses PT/DT with brisk capillary refill toes.    Other Findings:  Left  no swelling is present      XRAYS:      ASSESSMENT:   Ankle Sprain left  Left ankle pain, acute    PLAN:  I have discussed the  nature of this problem with the patient today.   I think she would benefit from  tie up ankle brace today to wear when active to help ankle stability. She will let me know if her ankle is not improving in 2 weeks at which time we would begin physical therapy - specifically ankle proprioception and peroneal strengthening, edema control.     [id number] Jabier Villegas, performed a custom orthotic / brace adjustment, fitting and training with the patient. The patient demonstrated understanding and proper care. This was performed for 15 minutes. Tie up ankle brace.   We will provide her with a physical therapy prescription. If she fails to have a good response we may consider further imaging studies as indicated.

## 2023-02-26 ENCOUNTER — PATIENT MESSAGE (OUTPATIENT)
Dept: SPORTS MEDICINE | Facility: CLINIC | Age: 43
End: 2023-02-26
Payer: COMMERCIAL

## 2023-02-27 ENCOUNTER — PATIENT MESSAGE (OUTPATIENT)
Dept: SPORTS MEDICINE | Facility: CLINIC | Age: 43
End: 2023-02-27
Payer: COMMERCIAL

## 2023-02-28 ENCOUNTER — HOSPITAL ENCOUNTER (EMERGENCY)
Facility: HOSPITAL | Age: 43
Discharge: HOME | End: 2023-03-01
Attending: EMERGENCY MEDICINE
Payer: COMMERCIAL

## 2023-02-28 VITALS
WEIGHT: 115 LBS | BODY MASS INDEX: 21.73 KG/M2 | DIASTOLIC BLOOD PRESSURE: 85 MMHG | SYSTOLIC BLOOD PRESSURE: 151 MMHG | OXYGEN SATURATION: 99 % | TEMPERATURE: 99 F | RESPIRATION RATE: 16 BRPM | HEART RATE: 66 BPM
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
## 2023-02-28 DIAGNOSIS — M25.552 LEFT HIP PAIN: ICD-10-CM
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
## 2023-02-28 DIAGNOSIS — M25.512 ACUTE PAIN OF LEFT SHOULDER: ICD-10-CM

## 2023-02-28 PROCEDURE — 99283 EMERGENCY DEPT VISIT LOW MDM: CPT

## 2023-02-28 PROCEDURE — 25000003 PHARM REV CODE 250: Performed by: NURSE PRACTITIONER

## 2023-02-28 RX ORDER — METHOCARBAMOL 500 MG/1
500 TABLET, FILM COATED ORAL
Status: COMPLETED | OUTPATIENT
Start: 2023-02-28 | End: 2023-02-28

## 2023-02-28 RX ORDER — DICLOFENAC SODIUM 50 MG/1
50 TABLET, DELAYED RELEASE ORAL 3 TIMES DAILY PRN
Qty: 20 TABLET | Refills: 2 | Status: SHIPPED | OUTPATIENT
Start: 2023-02-28 | End: 2023-05-04

## 2023-02-28 RX ORDER — NAPROXEN 250 MG/1
500 TABLET ORAL
Status: COMPLETED | OUTPATIENT
Start: 2023-02-28 | End: 2023-02-28

## 2023-02-28 RX ORDER — METHOCARBAMOL 500 MG/1
500 TABLET, FILM COATED ORAL 3 TIMES DAILY
Qty: 30 TABLET | Refills: 0 | Status: SHIPPED | OUTPATIENT
Start: 2023-02-28 | End: 2023-03-05

## 2023-02-28 RX ADMIN — METHOCARBAMOL TABLETS 500 MG: 500 TABLET, COATED ORAL at 10:02

## 2023-02-28 RX ADMIN — NAPROXEN 500 MG: 250 TABLET ORAL at 10:02

## 2023-02-28 NOTE — Clinical Note
"Ivy Kisermarkel Salgado was seen and treated in our emergency department on 2/28/2023.  She may return to work on 03/06/2023.       If you have any questions or concerns, please don't hesitate to call.      Iris Lawrence NP"

## 2023-02-28 NOTE — FIRST PROVIDER EVALUATION
Medical screening examination initiated.  I have conducted a focused provider triage encounter, findings are as follows:    Brief history of present illness:  fall     Vitals:    02/28/23 1654   BP: (!) 147/105   BP Location: Left arm   Patient Position: Sitting   Pulse: 68   Resp: 18   Temp: 98.7 °F (37.1 °C)   TempSrc: Oral   SpO2: 99%   Weight: 52.2 kg (115 lb)       Pertinent physical exam:  reports fell down 10 stairs sunday no loc     Brief workup plan:  xrays     Preliminary workup initiated; this workup will be continued and followed by the physician or advanced practice provider that is assigned to the patient when roomed.

## 2023-03-01 NOTE — ED PROVIDER NOTES
Encounter Date: 2/28/2023       History     Chief Complaint   Patient presents with    Fall     Fell down 10 steps on Sunday     Hip Pain     Left hip     Shoulder Injury     Left shoulder      Presents with complaint of left shoulder pain left hip pain and low back pain.  Patient reports she fell down several steps on Sunday.  Patient is ambulatory per self.  Has taken anti-inflammatories home for pain with some relief.      Review of patient's allergies indicates:   Allergen Reactions    Azathioprine sodium Other (See Comments)     Other reaction(s): pancreatitis  Other reaction(s): pancreatitis    Methotrexate analogues Other (See Comments)     leukopenia    Stelara [ustekinumab] Other (See Comments)     Multiple infections    Zofran [ondansetron hcl (pf)] Other (See Comments)     Per patient causes prolong QT    Vancomycin analogues Hives    Azathioprine      Other reaction(s): Unknown    Methotrexate      Other reaction(s): infection-    Morphine Itching and Other (See Comments)     Other reaction(s): Itching    Zofran [ondansetron hcl]      Other reaction(s): Hives    Bactrim [sulfamethoxazole-trimethoprim] Palpitations    Ciprofloxacin Palpitations     Past Medical History:   Diagnosis Date    Abnormal Pap smear 2007    Abnormal Pap smear 5/26/2011    Anemia     Anxiety     Arthritis     C. difficile diarrhea     Crohn's disease     Depression 8/5/2017    Encounter for blood transfusion     Genital HSV     History of colposcopy with cervical biopsy 2007 and 7/2011 2007-LYLA I  and 7/2011- LYLA I    Hypertension     Kidney stone     Kidney stone     Melanoma     Recurrent UTI 4/3/2013    S/P ileostomy 7/9/2012    Sterilization 6/23/2012     Past Surgical History:   Procedure Laterality Date    ABDOMINAL SURGERY      APPENDECTOMY      BILATERAL SALPINGO-OOPHORECTOMY (BSO) Bilateral 05/30/2019    Procedure: SALPINGO-OOPHORECTOMY, BILATERAL;  Surgeon: Rupa German MD;  Location: [facility] OR [address] [place] [address];  Service:  OB/GYN;  Laterality: Bilateral;    BLADDER SURGERY      partial cystectomy due to fistula    breast lift      BREAST SURGERY      [facility]      COLON SURGERY      COLONOSCOPY      CYSTOSCOPY  09/23/2020    Procedure: CYSTOSCOPY;  Surgeon: Sascha Florentino MD;  Location: [facility] OR 1ST FLR;  Service: Urology;;    CYSTOSCOPY W/ URETERAL STENT PLACEMENT Left 09/15/2020    Procedure: CYSTOSCOPY, WITH URETERAL STENT INSERTION;  Surgeon: Sascha Florentino MD;  Location: [facility] OR 1ST FLR;  Service: Urology;  Laterality: Left;    DIAGNOSTIC LAPAROSCOPY N/A 07/09/2020    Procedure: LAPAROSCOPY, DIAGNOSTIC;  Surgeon: Gilson El MD;  Location: [facility] OR;  Service: OB/GYN;  Laterality: N/A;    EXCISION OF MELANOMA  07/17/2019    ILEOSTOMY      LAPAROSCOPIC LYSIS OF ADHESIONS N/A 07/09/2020    Procedure: LYSIS, ADHESIONS, LAPAROSCOPIC;  Surgeon: Gilson El MD;  Location: [facility] OR;  Service: OB/GYN;  Laterality: N/A;    LASER LITHOTRIPSY  09/23/2020    Procedure: LITHOTRIPSY, USING LASER;  Surgeon: Sascha Florentino MD;  Location: [facility] OR 1ST FLR;  Service: Urology;;    LYSIS OF ADHESIONS N/A 05/30/2019    Procedure: LYSIS, ADHESIONS;  Surgeon: Rupa German MD;  Location: [facility] OR 2ND FLR;  Service: OB/GYN;  Laterality: N/A;    OOPHORECTOMY Right 04/16/2015    PORTACATH PLACEMENT  02/21/2017    SKIN BIOPSY      SMALL INTESTINE SURGERY      age 18 Y    TOTAL ABDOMINAL HYSTERECTOMY  04/16/2015    TOTAL COLECTOMY      TUBAL LIGATION  06/06/2012    UPPER GASTROINTESTINAL ENDOSCOPY      URETEROSCOPIC REMOVAL OF URETERIC CALCULUS  09/23/2020    Procedure: REMOVAL, CALCULUS, URETER, URETEROSCOPIC;  Surgeon: Sascha Florentino MD;  Location: [facility] OR 1ST FLR;  Service: Urology;;    URETEROSCOPY Left 09/23/2020    Procedure: URETEROSCOPY;  Surgeon: Sascha Florentino MD;  Location: [facility] OR 1ST FLR;  Service: Urology;  Laterality: Left;     Family History   Problem Relation Age of Onset    Colon cancer Father     Cancer  Father         Colon    Liver cancer Father     Hyperlipidemia Father     Hypertension Mother     Cancer Maternal Grandfather         Skin    Skin cancer Maternal Grandfather     Diabetes Maternal Grandfather     Heart disease Maternal Grandfather     Endometrial cancer Maternal Aunt     Crohn's disease Brother     Diabetes Paternal Grandfather     Hearing loss Paternal Grandmother     Breast cancer Neg Hx     Ovarian cancer Neg Hx     Melanoma Neg Hx      Social History     Tobacco Use    Smoking status: Never    Smokeless tobacco: Never   Substance Use Topics    Alcohol use: Not Currently     Alcohol/week: 0.0 standard drinks    Drug use: No     Review of Systems   Constitutional:  Negative for fever.   Respiratory:  Negative for cough, shortness of breath and wheezing.    Cardiovascular:  Negative for chest pain, palpitations and leg swelling.   Gastrointestinal:  Negative for abdominal pain, diarrhea, nausea and vomiting.   Genitourinary:  Negative for dysuria.   Musculoskeletal:  Positive for back pain. Negative for gait problem, joint swelling and neck pain.        Left hip and left shoulder pain   Skin:  Negative for rash.   Neurological:  Negative for weakness.     Physical Exam     Initial Vitals [02/28/23 1654]   BP Pulse Resp Temp SpO2   (!) 147/105 68 18 98.7 °F (37.1 °C) 99 %      MAP       --         Physical Exam    Constitutional: She appears well-developed and well-nourished.   HENT:   Head: Normocephalic and atraumatic.   Mouth/Throat: Oropharynx is clear and moist.   Eyes: Conjunctivae are normal.   Neck: Neck supple.   Normal range of motion.  Cardiovascular:  Normal rate and regular rhythm.           Pulmonary/Chest: Breath sounds normal. No respiratory distress.   Musculoskeletal:         General: Normal range of motion.      Cervical back: Normal range of motion and neck supple.      Comments: Patient has full range of motion to that left shoulder.  She can raise it above her head.  She also  is able to bend at the waist in all directions.  She is ambulatory per self and has full range of motion to that left hip.     Neurological: She is alert and oriented to person, place, and time. No sensory deficit. GCS score is 15. GCS eye subscore is 4. GCS verbal subscore is 5. GCS motor subscore is 6.   Skin: Skin is warm and dry. Capillary refill takes less than 2 seconds.   Psychiatric: She has a normal mood and affect. Thought content normal.       ED Course   Procedures  Labs Reviewed - No data to display       Imaging Results              X-Ray Hip 2 or 3 views Right (with Pelvis when performed) (In process)                      X-Ray Lumbar Spine 5 View (In process)                      X-Ray Shoulder Trauma Left (In process)                      Medications   naproxen tablet 500 mg (500 mg Oral Given 2/28/23 2230)   methocarbamoL tablet 500 mg (500 mg Oral Given 2/28/23 2230)     Medical Decision Making:   Initial Assessment:   Presents with left shoulder pain left hip pain and low back pain after falling down some steps.  Onset Sunday.  Clinical Tests:   Radiological Study: Reviewed  ED Management:  Left shoulder left hip and L-spine negative per Dr. Dow.  Patient was given naproxen 500 and Robaxin 500 here in the ED. heart rate 65 at discharge.  Patient was instructed to follow up with the primary care doctor.  At no time while in the ED did this patient appear to be in any acute distress.  She was given return precautions.  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  Heart rate at discharge was 68  Have discussed this patient with Dr. Dow                        Clinical Impression:   Final diagnoses:  [W19.XXXA] Fall  [W19.XXXA] Fall, initial encounter (Primary)  [M25.552] Left hip pain  [M25.512] Acute pain of left shoulder        ED Disposition Condition    Discharge Stable          ED Prescriptions       Medication Sig Dispense Start Date End Date Auth. Provider    diclofenac (VOLTAREN) 50 MG EC  tablet Take 1 tablet (50 mg total) by mouth 3 (three) times daily as needed. 20 tablet 2/28/2023 -- Iris Lawrence NP    methocarbamoL (ROBAXIN) 500 MG Tab Take 1 tablet (500 mg total) by mouth 3 (three) times daily. for 5 days 30 tablet 2/28/2023 3/5/2023 Iris Lawrence NP          Follow-up Information       Follow up With Specialties Details Why Contact Info    Kalia Astorga MD Internal Medicine In 3 days  [address] [facility] [address]  [phone]               Iris Lawrence NP  02/28/23 2239       Iris Lawrence NP  02/28/23 2323       Iris Lawrence NP  02/28/23 5875

## 2023-03-08 ENCOUNTER — TELEPHONE (OUTPATIENT)
Dept: SPORTS MEDICINE | Facility: CLINIC | Age: 43
End: 2023-03-08
Payer: COMMERCIAL

## 2023-03-08 DIAGNOSIS — F41.1 GENERALIZED ANXIETY DISORDER: ICD-10-CM

## 2023-03-09 ENCOUNTER — PATIENT MESSAGE (OUTPATIENT)
Dept: INTERNAL MEDICINE | Facility: CLINIC | Age: 43
End: 2023-03-09
Payer: COMMERCIAL

## 2023-03-09 RX ORDER — CLONAZEPAM 1 MG/1
TABLET ORAL
Qty: 75 TABLET | Refills: 0 | Status: SHIPPED | OUTPATIENT
Start: 2023-03-09 | End: 2023-04-10 | Stop reason: SDUPTHER

## 2023-03-09 NOTE — TELEPHONE ENCOUNTER
Care Due:                  Date            Visit Type   Department     Provider  --------------------------------------------------------------------------------                                EP -                              PRIMARY      [facility] INTERNAL  Last Visit: 06-      CARE ([facility])   MEDICINE       Kalia Astorga  Next Visit: None Scheduled  None         None Found                                                            Last  Test          Frequency    Reason                     Performed    Due Date  --------------------------------------------------------------------------------    Lipid Panel.  12 months..  mirtazapine..............  03- 03-    [facility] Embedded Care Gaps. Reference number: [id number]. 3/08/2023   9:29:11 PM CST

## 2023-03-09 NOTE — TELEPHONE ENCOUNTER
Refill Encounter    PCP Visits: Recent Visits  Date Type Provider Dept   06/23/22 Office Visit Kalia Astorga MD [facility] Internal Medicine   03/30/22 Office Visit Kalia Astorga MD [facility] Internal Medicine   Showing recent visits within past 360 days and meeting all other requirements  Future Appointments  No visits were found meeting these conditions.  Showing future appointments within next 720 days and meeting all other requirements     Last 3 Blood Pressure:   BP Readings from Last 3 Encounters:   02/28/23 (!) 151/85   02/17/23 107/67   02/02/23 116/82     Preferred Pharmacy:   [id number] - [place] - [address] [facility] [address]  [address] [place] [address]  Phone: [phone] Fax: [phone]    [facility] [address] [facility]  [address] [facility] [address]  Phone: [phone] Fax: [phone]    [facility] [address] [place] [address]  Phone: [phone] Fax: [phone]    [facility] [place]  [address]  [address] [place] [address]  Phone: [phone] Fax: [phone]    Requested RX:  Requested Prescriptions     Pending Prescriptions Disp Refills    clonazePAM (KLONOPIN) 1 MG tablet 75 tablet 0      RX Route: Normal

## 2023-03-10 ENCOUNTER — PATIENT MESSAGE (OUTPATIENT)
Dept: INTERNAL MEDICINE | Facility: CLINIC | Age: 43
End: 2023-03-10
Payer: COMMERCIAL

## 2023-03-13 ENCOUNTER — HOSPITAL ENCOUNTER (OUTPATIENT)
Dept: RADIOLOGY | Facility: OTHER | Age: 43
Discharge: HOME | End: 2023-03-13
Attending: FAMILY MEDICINE
Payer: COMMERCIAL

## 2023-03-13 ENCOUNTER — OFFICE VISIT (OUTPATIENT)
Dept: INTERNAL MEDICINE | Facility: CLINIC | Age: 43
End: 2023-03-13
Payer: COMMERCIAL

## 2023-03-13 ENCOUNTER — CLINICAL SUPPORT (OUTPATIENT)
Dept: OBSTETRICS AND GYNECOLOGY | Facility: CLINIC | Age: 43
End: 2023-03-13
Payer: COMMERCIAL

## 2023-03-13 VITALS
OXYGEN SATURATION: 99 % | WEIGHT: 111.69 LBS | HEART RATE: 68 BPM | DIASTOLIC BLOOD PRESSURE: 82 MMHG | BODY MASS INDEX: 21.1 KG/M2 | SYSTOLIC BLOOD PRESSURE: 138 MMHG

## 2023-03-13 DIAGNOSIS — M25.512 ACUTE PAIN OF LEFT SHOULDER: ICD-10-CM

## 2023-03-13 DIAGNOSIS — M79.671 RIGHT FOOT PAIN: ICD-10-CM

## 2023-03-13 DIAGNOSIS — Z79.890 HORMONE REPLACEMENT THERAPY (HRT): Primary | ICD-10-CM

## 2023-03-13 DIAGNOSIS — F07.81 POST CONCUSSION SYNDROME: Primary | ICD-10-CM

## 2023-03-13 PROCEDURE — 3075F SYST BP GE 130 - 139MM HG: CPT | Mod: CPTII,S$GLB,, | Performed by: FAMILY MEDICINE

## 2023-03-13 PROCEDURE — 96372 PR INJECTION,THERAP/PROPH/DIAG2ST, IM OR SUBCUT: ICD-10-PCS | Mod: S$GLB,,, | Performed by: SPECIALIST

## 2023-03-13 PROCEDURE — 99214 PR OFFICE/OUTPT VISIT, EST, LEVL IV, 30-39 MIN: ICD-10-PCS | Mod: S$GLB,,, | Performed by: FAMILY MEDICINE

## 2023-03-13 PROCEDURE — 99999 PR PBB SHADOW E&M-EST. PATIENT-LVL I: CPT | Mod: PBBFAC,,,

## 2023-03-13 PROCEDURE — 96372 THER/PROPH/DIAG INJ SC/IM: CPT | Mod: S$GLB,,, | Performed by: SPECIALIST

## 2023-03-13 PROCEDURE — 1159F PR MEDICATION LIST DOCUMENTED IN MEDICAL RECORD: ICD-10-PCS | Mod: CPTII,S$GLB,, | Performed by: FAMILY MEDICINE

## 2023-03-13 PROCEDURE — 99999 PR PBB SHADOW E&M-EST. PATIENT-LVL V: CPT | Mod: PBBFAC,,, | Performed by: FAMILY MEDICINE

## 2023-03-13 PROCEDURE — 3008F BODY MASS INDEX DOCD: CPT | Mod: CPTII,S$GLB,, | Performed by: FAMILY MEDICINE

## 2023-03-13 PROCEDURE — 99999 PR PBB SHADOW E&M-EST. PATIENT-LVL V: ICD-10-PCS | Mod: PBBFAC,,, | Performed by: FAMILY MEDICINE

## 2023-03-13 PROCEDURE — 3008F PR BODY MASS INDEX (BMI) DOCUMENTED: ICD-10-PCS | Mod: CPTII,S$GLB,, | Performed by: FAMILY MEDICINE

## 2023-03-13 PROCEDURE — 3079F PR MOST RECENT DIASTOLIC BLOOD PRESSURE 80-89 MM HG: ICD-10-PCS | Mod: CPTII,S$GLB,, | Performed by: FAMILY MEDICINE

## 2023-03-13 PROCEDURE — 99214 OFFICE O/P EST MOD 30 MIN: CPT | Mod: S$GLB,,, | Performed by: FAMILY MEDICINE

## 2023-03-13 PROCEDURE — 73630 X-RAY EXAM OF FOOT: CPT | Mod: TC,FY,RT

## 2023-03-13 PROCEDURE — 3075F PR MOST RECENT SYSTOLIC BLOOD PRESS GE 130-139MM HG: ICD-10-PCS | Mod: CPTII,S$GLB,, | Performed by: FAMILY MEDICINE

## 2023-03-13 PROCEDURE — 73630 X-RAY EXAM OF FOOT: CPT | Mod: 26,RT,, | Performed by: RADIOLOGY

## 2023-03-13 PROCEDURE — 3079F DIAST BP 80-89 MM HG: CPT | Mod: CPTII,S$GLB,, | Performed by: FAMILY MEDICINE

## 2023-03-13 PROCEDURE — 99999 PR PBB SHADOW E&M-EST. PATIENT-LVL I: ICD-10-PCS | Mod: PBBFAC,,,

## 2023-03-13 PROCEDURE — 73630 XR FOOT COMPLETE 3 VIEW RIGHT: ICD-10-PCS | Mod: 26,RT,, | Performed by: RADIOLOGY

## 2023-03-13 PROCEDURE — 1159F MED LIST DOCD IN RCRD: CPT | Mod: CPTII,S$GLB,, | Performed by: FAMILY MEDICINE

## 2023-03-13 RX ADMIN — ESTRADIOL VALERATE 20 MG: 40 INJECTION INTRAMUSCULAR at 01:03

## 2023-03-13 NOTE — PROGRESS NOTES
Subjective:       Patient ID: Ivy Salgado is a 42 y.o. female.    Chief Complaint: Hip Pain ((L) HIP PAIN), Shoulder Pain ((L) SHOULDER PAIN), and Numbness ((R) FOOT NUMBNESS)    HPI  Came in today to follow-up from trauma that she sustained on the 26th of February.    Had steroid shot in left shoulder.  During that same visit she mentioned some foot pain on the left side and was giving a foot brace.  A few days later she was wearing flip-flops with that foot brace and fell down 10 steps    Fell down stairs on 2/26 and feels like she strained the left arm/shoulder from trying to break her fall.  She went to the emergency department on the 28th and had multiple x-rays all which showed no acute findings.  She took a week off of work but last week whenever she trying to go back she was having headaches and blurry vision and found herself forgetting to do things.  She has noticed that the sunlight and driving will exacerbate her headaches.  If she is just laying down she will feel pretty fine.    On right foot having pins and needles in 1st and 2nd toes with radiation around the medial side of foot.  Not having any pain radiating from her back or upper extremities.  No right-sided on weakness.  Does feel like her left arm is heavy.  Had history of left clavicle fracture in the past but she is able to lift her arms in all directions.    Thinks she hit head b/c she had knot on forehead.     Also frontal and HA of occiput.     Took week off but after going back for a couple days she feel like she was having more HA and some vision blurriness.     All of your core healthy metrics are met.      Social History     Social History Narrative    Not on file       Family History   Problem Relation Age of Onset    Colon cancer Father     Cancer Father         Colon    Liver cancer Father     Hyperlipidemia Father     Hypertension Mother     Cancer Maternal Grandfather         Skin    Skin cancer Maternal Grandfather      Diabetes Maternal Grandfather     Heart disease Maternal Grandfather     Endometrial cancer Maternal Aunt     Crohn's disease Brother     Diabetes Paternal Grandfather     Hearing loss Paternal Grandmother     Breast cancer Neg Hx     Ovarian cancer Neg Hx     Melanoma Neg Hx        Current Outpatient Medications:     acyclovir 5% (ZOVIRAX) 5 % Crea, Apply topically 5 (five) times daily., Disp: 5 g, Rfl: 1    boric acid (BORIC ACID) vaginal suppository, Place 1 each (650 mg total) vaginally nightly as needed., Disp: 30 suppository, Rfl: 12    clonazePAM (KLONOPIN) 1 MG tablet, TAKE 1 AND 1/2 TABLETS BY MOUTH TWICE DAILY AS NEEDED FOR ANXIETY, Disp: 75 tablet, Rfl: 0    diphenoxylate-atropine 2.5-0.025 mg (LOMOTIL) 2.5-0.025 mg per tablet, Take 1 tablet by mouth 4 (four) times daily as needed for Diarrhea. for diarrhea, Disp: 100 tablet, Rfl: 0    dronabinoL (MARINOL) 5 MG capsule, TAKE 1 CAPSULE BY MOUTH TWICE DAILY BEFORE MEALS, Disp: 60 capsule, Rfl: 1    gabapentin (NEURONTIN) 300 MG capsule, TAKE ONE CAPSULE BY MOUTH ONCE DAILY. AFTER 2 WEEKS, TAKE 1 CAPSULE TWICE DAILY, Disp: 60 capsule, Rfl: 11    LIDOcaine (LIDODERM) 5 %, Place 1 patch onto the skin once daily. Remove & Discard patch within 12 hours or as directed by MD, Disp: 30 patch, Rfl: 0    loperamide (IMODIUM) 2 mg capsule, Take 2 mg by mouth daily as needed for Diarrhea., Disp: , Rfl:     mirtazapine (REMERON SOL-TAB) 30 MG disintegrating tablet, DISSOLVE 1 TABLET(30 MG) ON THE TONGUE EVERY NIGHT, Disp: 90 tablet, Rfl: 2    multivitamin (THERAGRAN) per tablet, Take 1 tablet by mouth once daily., Disp: , Rfl:     nadoloL (CORGARD) 40 MG tablet, Take 1 tablet (40 mg total) by mouth once daily., Disp: 30 tablet, Rfl: 11    pantoprazole (PROTONIX) 40 MG tablet, Take 1 tablet (40 mg total) by mouth 2 (two) times daily., Disp: 60 tablet, Rfl: 12    promethazine (PHENERGAN) 25 MG tablet, TAKE 1 TABLET BY MOUTH EVERY 8 HOURS FOR NAUSEA, Disp: 30 tablet, Rfl:  1    sumatriptan (IMITREX) 50 MG tablet, TAKE 1 TABLET BY MOUTH AS NEEDED FOR MIGRAINE. MAY REPEAT ONCE IN 2 HRS. DO NOT EXCEED 2 TABS IN 24 HRS, Disp: 12 tablet, Rfl: 0    valACYclovir (VALTREX) 500 MG tablet, TAKE 1 TABLET(500 MG) BY MOUTH EVERY DAY, Disp: 90 tablet, Rfl: 3    vedolizumab (ENTYVIO) 300 mg SolR injection, Inject 300 mg into the vein., Disp: , Rfl:     zolpidem (AMBIEN) 10 mg Tab, TAKE 1 TABLET BY MOUTH EVERY EVENING, Disp: 30 tablet, Rfl: 0    benzocaine 20 % Oint, Compound benzocaine 20% / lidocaine 6% / tetracaine 4% ointment. Apply to affected area 1 hour prior to procedure. (Patient not taking: Reported on 3/13/2023), Disp: 30 g, Rfl: 0    diclofenac (VOLTAREN) 50 MG EC tablet, Take 1 tablet (50 mg total) by mouth 3 (three) times daily as needed. (Patient not taking: Reported on 3/13/2023), Disp: 20 tablet, Rfl: 2    fluconazole (DIFLUCAN) 150 MG Tab, Take 1 tablet (150 mg total) by mouth every 72 hours as needed (vaginal yeast). (Patient not taking: Reported on 3/13/2023), Disp: 3 tablet, Rfl: 0    HYDROcodone-acetaminophen (NORCO) 5-325 mg per tablet, Take 1 tablet by mouth every 6 (six) hours as needed for Pain. (Patient not taking: Reported on 3/13/2023), Disp: 8 tablet, Rfl: 0    Current Facility-Administered Medications:     estradiol valerate (DELESTROGEN) injection 20 mg/mL, 20 mg, Intramuscular, Q30 Days, Gilson El MD, 20 mg at 12/30/22 0902    estradiol valerate injection 20 mg, 20 mg, Intramuscular, Q30 Days, Gilson El MD, 20 mg at 03/13/23 1353    Review of Systems   Constitutional:  Negative for chills and fever.   Eyes:  Positive for visual disturbance.   Respiratory:  Negative for cough and shortness of breath.    Cardiovascular:  Negative for chest pain.   Gastrointestinal:  Negative for abdominal pain.   Skin:  Negative for rash.   Neurological:  Positive for headaches. Negative for dizziness.     Objective:   /82 (BP Location: Left arm, Patient Position:  Sitting)   Pulse 68   Wt 50.7 kg (111 lb 10.6 oz)   LMP 03/30/2015   SpO2 99%   BMI 21.10 kg/m²      Physical Exam  Constitutional:       General: She is not in acute distress.     Appearance: She is well-developed. She is not diaphoretic.   HENT:      Head: Normocephalic and atraumatic.      Nose: Nose normal.   Eyes:      General:         Right eye: No discharge.         Left eye: No discharge.      Conjunctiva/sclera: Conjunctivae normal.      Pupils: Pupils are equal, round, and reactive to light.   Neck:      Thyroid: No thyromegaly.   Cardiovascular:      Rate and Rhythm: Normal rate and regular rhythm.      Heart sounds: No murmur heard.  Pulmonary:      Effort: Pulmonary effort is normal. No respiratory distress.      Breath sounds: Normal breath sounds.   Abdominal:      General: There is no distension.      Palpations: Abdomen is soft.   Musculoskeletal:      Comments: With provocative testing of her left rotator cuff no signs of rotator cuff tear but did have some discomfort   Skin:     Findings: No rash.   Neurological:      General: No focal deficit present.      Mental Status: She is alert and oriented to person, place, and time.      Cranial Nerves: No cranial nerve deficit.      Coordination: Coordination normal.   Psychiatric:         Behavior: Behavior normal.       Assessment & Plan     Problem List Items Addressed This Visit          Neuro    Post concussion syndrome - Primary    Current Assessment & Plan     No red flag symptoms but her condition and the episodes support likely postconcussive syndrome.  Recommended continued brain rest and suggested that she should take an additional week off from work.            Orthopedic    Acute pain of left shoulder    Current Assessment & Plan     Do not suspect rotator cuff tear but more of a strain.  Suggested going in to physical therapy to help work on strength and stability.         Relevant Orders    Ambulatory referral/consult to  Physical/Occupational Therapy    Right foot pain    Relevant Orders    X-Ray Foot Complete 3 view Right         Immunizations Administered on Date of Encounter - 3/13/2023       No immunizations on file.             No follow-ups on file.      Disclaimer:  This note may have been prepared using voice recognition software, it may have not been extensively proofed, as such there could be errors within the text such as sound alike errors.

## 2023-03-13 NOTE — TELEPHONE ENCOUNTER
Ms. Salgado is scheduled to talk to  Weeks later on today regarding numbness in feet and headaches.

## 2023-03-13 NOTE — ASSESSMENT & PLAN NOTE
Do not suspect rotator cuff tear but more of a strain.  Suggested going in to physical therapy to help work on strength and stability.

## 2023-03-13 NOTE — PROGRESS NOTES
Here for hormone therapy injection, no complaints at this time. Injection given as ordered, tolerated well no reports of pain prior to or post injection. Advised to return to clinic as scheduled      Site:RB      Delestrogen: 20Mg    CLINIC SUPPLIED MEDICATION

## 2023-03-13 NOTE — ASSESSMENT & PLAN NOTE
No red flag symptoms but her condition and the episodes support likely postconcussive syndrome.  Recommended continued brain rest and suggested that she should take an additional week off from work.

## 2023-03-13 NOTE — LETTER
March 22, 2023      [facility]  [address]  [facility] [address]  Phone: [phone]  Fax: [phone]       Patient: Ivy Salgado   YOB: 1980  Date of Visit: 03/13/2023    To Whom It May Concern:    John Salgado  was at [facility] on 03/13/2023. Please excuse patient was work from 3/13/2023 through 3/24/2023.  The patient may return to work on 3/27/2023 with no restrictions. If you have any questions or concerns, or if I can be of further assistance, please do not hesitate to contact me.    Sincerely,    Luis Madden, DO

## 2023-03-13 NOTE — LETTER
March 13, 2023    Ivy Salgado  [address] [place] [id number]         [facility]  [address] [facility] [address]  Phone: [phone]  Fax: [phone] March 13, 2023     Patient: Ivy Salgado   YOB: 1980   Date of Visit: 3/13/2023       To Whom It May Concern:    It is my medical opinion that Ivy Salgado should remain out of work until 3/20/23.    If you have any questions or concerns, please don't hesitate to call.    Sincerely,        Luis Madden, DO

## 2023-03-16 ENCOUNTER — PATIENT MESSAGE (OUTPATIENT)
Dept: INTERNAL MEDICINE | Facility: CLINIC | Age: 43
End: 2023-03-16
Payer: COMMERCIAL

## 2023-03-16 ENCOUNTER — TELEPHONE (OUTPATIENT)
Dept: INTERNAL MEDICINE | Facility: CLINIC | Age: 43
End: 2023-03-16
Payer: COMMERCIAL

## 2023-03-16 NOTE — TELEPHONE ENCOUNTER
----- Message from Luis Madden, DO sent at 3/14/2023 12:37 PM CDT -----  Please inform patient that all of the labs are normal if he/she does not have [facility]. If there are any questions please let me know.

## 2023-03-17 ENCOUNTER — PATIENT MESSAGE (OUTPATIENT)
Dept: RESEARCH | Facility: HOSPITAL | Age: 43
End: 2023-03-17
Payer: COMMERCIAL

## 2023-03-17 NOTE — TELEPHONE ENCOUNTER
Please see attached form.  I also put a copy in your desk at [facility] just incase she has to be seen first.  Please advise. thanks

## 2023-04-07 RX ORDER — PROMETHAZINE HYDROCHLORIDE 25 MG/1
TABLET ORAL
Qty: 30 TABLET | Refills: 1 | Status: SHIPPED | OUTPATIENT
Start: 2023-04-07 | End: 2023-06-02 | Stop reason: SDUPTHER

## 2023-04-10 DIAGNOSIS — F41.1 GENERALIZED ANXIETY DISORDER: ICD-10-CM

## 2023-04-10 RX ORDER — CLONAZEPAM 1 MG/1
TABLET ORAL
Qty: 75 TABLET | Refills: 0 | Status: SHIPPED | OUTPATIENT
Start: 2023-04-10 | End: 2023-05-08 | Stop reason: SDUPTHER

## 2023-04-10 NOTE — TELEPHONE ENCOUNTER
No new care gaps identified.  [facility] Embedded Care Gaps. Reference number: [id number]. 4/10/2023   6:52:24 AM CDT

## 2023-04-10 NOTE — TELEPHONE ENCOUNTER
Refill Encounter    PCP Visits: Recent Visits  Date Type Provider Dept   06/23/22 Office Visit Kalia Astorga MD [facility] Internal Medicine   Showing recent visits within past 360 days and meeting all other requirements  Future Appointments  No visits were found meeting these conditions.  Showing future appointments within next 720 days and meeting all other requirements     Last 3 Blood Pressure:   BP Readings from Last 3 Encounters:   03/20/23 118/77   03/13/23 138/82   02/28/23 (!) 151/85     Preferred Pharmacy:   [id number] - [place] - [address]  AT [facility] [address]  [address] [place] [address]  Phone: [phone] Fax: [phone]    [facility] [place] - [address]  [address] [facility] [address]  Phone: [phone] Fax: [phone]    [facility] [address] [place] [address]  Phone: [phone] Fax: [phone]    [facility]  [place] - [address]  [address] [place] [address]  Phone: [phone] Fax: [phone]    Requested RX:  Requested Prescriptions     Pending Prescriptions Disp Refills    clonazePAM (KLONOPIN) 1 MG tablet 75 tablet 0     Sig: TAKE 1 AND 1/2 TABLETS BY MOUTH TWICE DAILY AS NEEDED FOR ANXIETY      RX Route: Normal

## 2023-04-13 ENCOUNTER — CLINICAL SUPPORT (OUTPATIENT)
Dept: OBSTETRICS AND GYNECOLOGY | Facility: CLINIC | Age: 43
End: 2023-04-13
Payer: COMMERCIAL

## 2023-04-13 DIAGNOSIS — Z79.890 HORMONE REPLACEMENT THERAPY (HRT): Primary | ICD-10-CM

## 2023-04-13 PROCEDURE — 96372 THER/PROPH/DIAG INJ SC/IM: CPT | Mod: S$GLB,,, | Performed by: SPECIALIST

## 2023-04-13 PROCEDURE — 99999 PR PBB SHADOW E&M-EST. PATIENT-LVL I: ICD-10-PCS | Mod: PBBFAC,,,

## 2023-04-13 PROCEDURE — 99999 PR PBB SHADOW E&M-EST. PATIENT-LVL I: CPT | Mod: PBBFAC,,,

## 2023-04-13 PROCEDURE — 96372 PR INJECTION,THERAP/PROPH/DIAG2ST, IM OR SUBCUT: ICD-10-PCS | Mod: S$GLB,,, | Performed by: SPECIALIST

## 2023-04-13 RX ADMIN — ESTRADIOL VALERATE 20 MG: 40 INJECTION INTRAMUSCULAR at 02:04

## 2023-04-13 NOTE — PROGRESS NOTES
Here for hormone therapy injection, no complaints at this time. Injection given as ordered, tolerated well no reports of pain prior to or post injection. Advised to return to clinic as scheduled      Site:RB     Delestrogen: 20 mg    CLINIC SUPPLIED MEDICATION

## 2023-04-17 ENCOUNTER — DOCUMENTATION ONLY (OUTPATIENT)
Dept: REHABILITATION | Facility: HOSPITAL | Age: 43
End: 2023-04-17

## 2023-04-17 NOTE — PROGRESS NOTES
Patient: Ivy Salgado  Date of Session: 4/17/2023  MRN: [id number]  Ivy Salgado did not attend his/her scheduled therapy appointment today and did not call to cancel nor reschedule. Physical therapy will follow up with patient at their next scheduled visit. No charges have been posted today.

## 2023-04-19 ENCOUNTER — PATIENT MESSAGE (OUTPATIENT)
Dept: DERMATOLOGY | Facility: CLINIC | Age: 43
End: 2023-04-19
Payer: COMMERCIAL

## 2023-04-26 ENCOUNTER — PATIENT MESSAGE (OUTPATIENT)
Dept: SPORTS MEDICINE | Facility: CLINIC | Age: 43
End: 2023-04-26
Payer: COMMERCIAL

## 2023-04-28 ENCOUNTER — OFFICE VISIT (OUTPATIENT)
Dept: SPORTS MEDICINE | Facility: CLINIC | Age: 43
End: 2023-04-28
Payer: COMMERCIAL

## 2023-04-28 ENCOUNTER — HOSPITAL ENCOUNTER (OUTPATIENT)
Dept: RADIOLOGY | Facility: HOSPITAL | Age: 43
Discharge: HOME | End: 2023-04-28
Attending: PHYSICIAN ASSISTANT
Payer: COMMERCIAL

## 2023-04-28 VITALS
HEART RATE: 73 BPM | WEIGHT: 111 LBS | DIASTOLIC BLOOD PRESSURE: 77 MMHG | SYSTOLIC BLOOD PRESSURE: 113 MMHG | HEIGHT: 61 IN | BODY MASS INDEX: 20.96 KG/M2

## 2023-04-28 DIAGNOSIS — M25.512 LEFT SHOULDER PAIN, UNSPECIFIED CHRONICITY: Primary | ICD-10-CM

## 2023-04-28 DIAGNOSIS — M25.512 LEFT SHOULDER PAIN, UNSPECIFIED CHRONICITY: ICD-10-CM

## 2023-04-28 DIAGNOSIS — G89.29 CHRONIC LEFT SHOULDER PAIN: ICD-10-CM

## 2023-04-28 DIAGNOSIS — M25.512 CHRONIC LEFT SHOULDER PAIN: ICD-10-CM

## 2023-04-28 PROCEDURE — 3078F PR MOST RECENT DIASTOLIC BLOOD PRESSURE < 80 MM HG: ICD-10-PCS | Mod: CPTII,S$GLB,, | Performed by: PHYSICIAN ASSISTANT

## 2023-04-28 PROCEDURE — 99999 PR PBB SHADOW E&M-EST. PATIENT-LVL V: ICD-10-PCS | Mod: PBBFAC,,, | Performed by: PHYSICIAN ASSISTANT

## 2023-04-28 PROCEDURE — 3074F PR MOST RECENT SYSTOLIC BLOOD PRESSURE < 130 MM HG: ICD-10-PCS | Mod: CPTII,S$GLB,, | Performed by: PHYSICIAN ASSISTANT

## 2023-04-28 PROCEDURE — 99214 OFFICE O/P EST MOD 30 MIN: CPT | Mod: S$GLB,,, | Performed by: PHYSICIAN ASSISTANT

## 2023-04-28 PROCEDURE — 3008F PR BODY MASS INDEX (BMI) DOCUMENTED: ICD-10-PCS | Mod: CPTII,S$GLB,, | Performed by: PHYSICIAN ASSISTANT

## 2023-04-28 PROCEDURE — 3008F BODY MASS INDEX DOCD: CPT | Mod: CPTII,S$GLB,, | Performed by: PHYSICIAN ASSISTANT

## 2023-04-28 PROCEDURE — 99999 PR PBB SHADOW E&M-EST. PATIENT-LVL V: CPT | Mod: PBBFAC,,, | Performed by: PHYSICIAN ASSISTANT

## 2023-04-28 PROCEDURE — 3074F SYST BP LT 130 MM HG: CPT | Mod: CPTII,S$GLB,, | Performed by: PHYSICIAN ASSISTANT

## 2023-04-28 PROCEDURE — 3078F DIAST BP <80 MM HG: CPT | Mod: CPTII,S$GLB,, | Performed by: PHYSICIAN ASSISTANT

## 2023-04-28 PROCEDURE — 99214 PR OFFICE/OUTPT VISIT, EST, LEVL IV, 30-39 MIN: ICD-10-PCS | Mod: S$GLB,,, | Performed by: PHYSICIAN ASSISTANT

## 2023-04-28 NOTE — PROGRESS NOTES
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       Left shoulder pain began 4+ years ago without injury or trauma and is located of the posterior superior shoulder and is constant. Pain mostly occurs with movement of her shoulder and activity. She previously had a MRI of the shoulder which showed an area of AVN of the superior articular margin of the humerus. She has been receiving yearly left shoulder CSI with good pain relief until her last one on 2/17/23 which did not provide much pain relief. She has tried over the counter medicine over that time with no pain relief. She is requesting a repeat shoulder injections today.        She has a history of Crohn's disease and reports that she has not been on steroid in a long time (years).      She reports that the pain is worse with overhead activity. It also bothers her at night.     Is affecting ADLs.   RHD    Review of Systems   Constitution: Negative. Negative for chills, fever and night sweats.   HENT: Negative for congestion and headaches.    Eyes: Negative for blurred vision, left vision loss and right vision loss.   Cardiovascular: Negative for chest pain and syncope.   Respiratory: Negative for cough and shortness of breath.    Endocrine: Negative for polydipsia, polyphagia and polyuria.   Hematologic/Lymphatic: Negative for bleeding problem. Does not bruise/bleed easily.   Skin: Negative for dry skin, itching and rash.   Musculoskeletal: Negative for falls and muscle weakness.   Gastrointestinal: Negative for abdominal pain and bowel incontinence.   Genitourinary: Negative for bladder incontinence and nocturia.   Neurological: Negative for disturbances in coordination, loss of balance and seizures.   Psychiatric/Behavioral: Negative for depression. The patient does not have insomnia.     Allergic/Immunologic: Negative for hives and persistent infections.      PAST MEDICAL HISTORY:        Past Medical History:   Diagnosis Date    Abnormal Pap smear 2007    Abnormal Pap smear 5/26/2011    Anemia      Anxiety      Arthritis      C. difficile diarrhea      Crohn's disease      Depression 8/5/2017    Encounter for blood transfusion      Genital HSV      History of colposcopy with cervical biopsy 2007 and 7/2011 2007-LYLA I  and 7/2011- LYLA I    Hypertension      Kidney stone      Kidney stone      Melanoma      Recurrent UTI 4/3/2013    S/P ileostomy 7/9/2012    Sterilization 6/23/2012      PAST SURGICAL HISTORY:         Past Surgical History:   Procedure Laterality Date    ABDOMINAL SURGERY        APPENDECTOMY        BILATERAL SALPINGO-OOPHORECTOMY (BSO) Bilateral 5/30/2019     Procedure: SALPINGO-OOPHORECTOMY, BILATERAL;  Surgeon: Rupa German MD;  Location: [facility] OR [address] [place] [address];  Service: OB/GYN;  Laterality: Bilateral;    BLADDER SURGERY         partial cystectomy due to fistula    [facility]        COLON SURGERY        COLONOSCOPY        CYSTOSCOPY   9/23/2020     Procedure: CYSTOSCOPY;  Surgeon: Sascha Florentino MD;  Location: [facility] OR [address] [place] [address];  Service: Urology;;    CYSTOSCOPY W/ URETERAL STENT PLACEMENT Left 9/15/2020     Procedure: CYSTOSCOPY, WITH URETERAL STENT INSERTION;  Surgeon: Sascha Florentino MD;  Location: [address];  Service: Urology;  Laterality: Left;    DIAGNOSTIC LAPAROSCOPY N/A 7/9/2020     Procedure: LAPAROSCOPY, DIAGNOSTIC;  Surgeon: Gilson El MD;  Location: [facility] OR;  Service: OB/GYN;  Laterality: N/A;    EXCISION OF MELANOMA   07/17/2019    ILEOSTOMY        LAPAROSCOPIC LYSIS OF ADHESIONS N/A 7/9/2020     Procedure: LYSIS, ADHESIONS, LAPAROSCOPIC;  Surgeon: Gilson El MD;  Location: [facility];  Service: OB/GYN;  Laterality: N/A;    LASER LITHOTRIPSY   9/23/2020     Procedure: LITHOTRIPSY, USING LASER;  Surgeon: Sascha Florentino MD;  Location: [facility]  1ST FLR;  Service: Urology;;    LYSIS OF ADHESIONS N/A 5/30/2019     Procedure: LYSIS, ADHESIONS;  Surgeon: Rupa German MD;  Location: [facility] OR 2ND FLR;  Service: OB/GYN;  Laterality: N/A;    OOPHORECTOMY Right 04/16/2015    PORTACATH PLACEMENT   02/21/2017    SKIN BIOPSY        SMALL INTESTINE SURGERY         age 18 Y    TOTAL ABDOMINAL HYSTERECTOMY   04/16/2015    TOTAL COLECTOMY        TUBAL LIGATION   06/06/2012    UPPER GASTROINTESTINAL ENDOSCOPY        URETEROSCOPIC REMOVAL OF URETERIC CALCULUS   9/23/2020     Procedure: REMOVAL, CALCULUS, URETER, URETEROSCOPIC;  Surgeon: Sascha Florentino MD;  Location: [facility] OR 1ST FLR;  Service: Urology;;    URETEROSCOPY Left 9/23/2020     Procedure: URETEROSCOPY;  Surgeon: Sascha Florentino MD;  [facility];  Service: Urology;  Laterality: Left;      FAMILY HISTORY:         Family History   Problem Relation Age of Onset    Colon cancer Father      Cancer Father           colon cancer    Liver cancer Father      Hypertension Mother      Cancer Maternal Grandfather           esophageal      Skin cancer Maternal Grandfather      Endometrial cancer Maternal Aunt      Crohn's disease Brother      Breast cancer Neg Hx      Ovarian cancer Neg Hx      Melanoma Neg Hx        SOCIAL HISTORY:   Social History               Socioeconomic History    Marital status: Single       Spouse name: Not on file    Number of children: Not on file    Years of education: Not on file    Highest education level: Not on file   Occupational History       Employer: [employer]   Social Needs    Financial resource strain: Very hard    Food insecurity       Worry: Often true       Inability: Sometimes true    Transportation needs       Medical: No       Non-medical: No   Tobacco Use    Smoking status: Never Smoker    Smokeless tobacco: Never Used   Substance and Sexual Activity    Alcohol use: Not Currently       Alcohol/week: 0.0 standard drinks       Frequency:  Never       Drinks per session: Patient refused       Binge frequency: Never    Drug use: No    Sexual activity: Yes       Partners: Male       Birth control/protection: Surgical       Comment: HYST   Lifestyle    Physical activity       Days per week: 2 days       Minutes per session: 10 min    Stress: To some extent   Relationships    Social connections       Talks on phone: Not on file       Gets together: Once a week       Attends Jewish service: Not on file       Active member of club or organization: No       Attends meetings of clubs or organizations: Never       Relationship status: Living with partner   Other Topics Concern    Are you pregnant or think you may be? No    Breast-feeding No   Social History Narrative    Not on file            MEDICATIONS:   Current Outpatient Medications:     clonazePAM (KLONOPIN) 1 MG tablet, TAKE 1 AND 1/2 TABLETS BY MOUTH TWICE DAILY AS NEEDED FOR ANXIETY, Disp: 90 tablet, Rfl: 0    diphenoxylate-atropine 2.5-0.025 mg (LOMOTIL) 2.5-0.025 mg per tablet, Take 1 tablet by mouth 4 (four) times daily as needed. for diarrhea, Disp: 100 tablet, Rfl: 0    dronabinoL (MARINOL) 5 MG capsule, Take 1 capsule (5 mg total) by mouth 2 (two) times daily before meals., Disp: 60 capsule, Rfl: 0    famotidine (PEPCID) 20 MG tablet, Take 20 mg by mouth 2 (two) times daily., Disp: , Rfl:     fluconazole (DIFLUCAN) 150 MG Tab, Take 1 tablet (150 mg total) by mouth every 72 hours as needed., Disp: 3 tablet, Rfl: 0    food supplemt, lactose-reduced (BOOST BREEZE NUTRITIONAL) 0.04-1.05 gram-kcal/mL Liqd, Use 3 times per day, Disp: 6399 mL, Rfl: 5    gabapentin (NEURONTIN) 300 MG capsule, TAKE ONE CAPSULE BY MOUTH ONCE DAILY. AFTER 2 WEEKS, TAKE 1 CAPSULE TWICE DAILY, Disp: 60 capsule, Rfl: 11    loperamide (IMODIUM) 2 mg capsule, Take 2 mg by mouth daily as needed for Diarrhea., Disp: , Rfl:     mirtazapine (REMERON SOL-TAB) 30 MG disintegrating tablet, Take 1 tablet (30 mg total) by mouth  "nightly., Disp: 90 tablet, Rfl: 0    multivitamin (ONE DAILY MULTIVITAMIN) per tablet, Take 1 tablet by mouth once daily., Disp: , Rfl:     oxyCODONE-acetaminophen (PERCOCET) 7.5-325 mg per tablet, Take 1 tablet by mouth every 6 (six) hours as needed for Pain., Disp: 12 tablet, Rfl: 0    promethazine (PHENERGAN) 25 MG tablet, TAKE 1 TABLET(25 MG) BY MOUTH EVERY 6 HOURS AS NEEDED, Disp: 30 tablet, Rfl: 0    sumatriptan (IMITREX) 50 MG tablet, Take 1 tab po prn migraine; may repeat once in 2 hours prn; do not exceed 2 tabs in 24 hours, Disp: 12 tablet, Rfl: 0    valACYclovir (VALTREX) 500 MG tablet, TAKE 1 TABLET BY MOUTH EVERY DAY, Disp: 90 tablet, Rfl: 2    vedolizumab (ENTYVIO) 300 mg SolR injection, Inject 300 mg into the vein., Disp: , Rfl:     zolpidem (AMBIEN) 10 mg Tab, TAKE 1 TABLET BY MOUTH EVERY NIGHT AT BEDTIME, Disp: 30 tablet, Rfl: 1    potassium citrate (UROCIT-K 15) 15 mEq TbSR, Take 2 tablets by mouth 2 (two) times daily., Disp: 360 tablet, Rfl: 3  No current facility-administered medications for this visit.   ALLERGIES:         Review of patient's allergies indicates:   Allergen Reactions    Azathioprine sodium Other (See Comments)       Other reaction(s): pancreatitis  Other reaction(s): pancreatitis    Methotrexate analogues Other (See Comments)       leukopenia    Stelara [ustekinumab] Other (See Comments)       Multiple infections    Zofran [ondansetron hcl (pf)] Other (See Comments)       Per patient causes prolong QT    Vancomycin analogues Hives    Morphine Itching and Other (See Comments)       Other reaction(s): Itching    Bactrim [sulfamethoxazole-trimethoprim] Palpitations    Ciprofloxacin Palpitations         VITAL SIGNS: /82   Pulse 85   Ht 5' 1" (1.549 m)   Wt 56.7 kg (125 lb)   LMP 03/30/2015   BMI 23.62 kg/m²       PHYSICAL EXAMINATION:  General:  The patient is alert and oriented x 3.  Mood is pleasant.  Observation of ears, eyes and nose reveal no gross abnormalities.  No " labored breathing observed.  Gait is coordinated. Patient can toe walk and heel walk without difficulty.        Left SHOULDER / UPPER EXTREMITY EXAM       OBSERVATION:    Swelling                       none                Deformity                     none  Discoloration               none                Scapular winging        none  Scars                           none                Atrophy                        none     TENDERNESS / CREPITUS (T/C):                                                    T/C                                                                  T/C  Clavicle                       -/-                     SUPRAspinatus                      -/-                      AC Jt.                          -/-                     INFRAspinatus                        -/-           SC Jt.                          -/-                     Deltoid                                     -/-                                   G. Tuberosity              + and superior humeral head/-                     LH BICEP groove                   +/-  Acromion:                    -/-                     Midline Neck                           -/-                       Scapular Spine           -/-                     Trapezium                               -/-  SMA Scapula              -/-                     GH jt. line - post                    - /-                      Scapulothoracic          -/-                                                 ROM:   (* = with pain)                       Right shoulder                         Left shoulder                                                   AROM (PROM)                       AROM (PROM)  FE                                            170° (175°)                              155° (160°)  *  ER at 0°                                   60°  (65°)                                 50°  (55°)*  ER at 90° ABD                        90°  (90°)                                 90°   (90°)*  IR at 90°  ABD                        NA  (40°)                                 NA  (40°)           IR (spine level)                        T10                                          T12*     STRENGTH:   (* = with pain)           RIGHT SHOULDER                LEFT SHOULDER  SCAPTION                              5/5                                           5-/5* at 0 and 5/5 at 30            IR                                             5/5                                           5/5  ER                                           5/5                                           5/5  BICEPS                                   5/5                                           5/5  Deltoid                                     5/5                                           5/5    SIGNS:  Painful side                                         NEER                          + left                              OKHADIJAHS                  -       CARTER                   + left                             SPEEDS                     +       left                 DROP ARM                 neg                              BELLY PRESS           neg  Superior escape          none                            LIFT-OFF                    neg  X-Body ADD                neg                              MOVING VALGUS      neg                                               STABILITY TESTING    RIGHT SHOULDER                         LEFT SHOULDER    Translation                   Anterior                       up face                                                up face              Posterior                      up face                                                up face              Sulcus                         < 10mm                                               < 10 mm     Signs  Apprehension              neg                                                      neg                                            Relocation                    no change                                           no change                                 Jerk test                      neg                                                      neg     EXTREMITY NEURO-VASCULAR EXAM   Sensation grossly intact to light touch all dermatomal regions.   DTR 2+ Biceps, Triceps, BR and Negative Lillies sign  Grossly intact motor function at Elbow, Wrist and Hand  Distal pulses radial and ulnar 2+, brisk cap refill, symmetric.   NECK:  Painless FROM and spinous processes non-tender. Negative Spurlings sign.       OTHER FINDINGS:  + scapular dyskinesia  Negative bear hug test.      Xrays:  Xrays of the bilateral shoulder 3 views were ordered and reviewed by me today. No fracture, subluxation, or other significant bony or joint abnormality is identified. Slight DJD seen. Slight sign of AVN but no collapse on xray today to left humeral head.     Radiology can see some small areas of sclerosis of the superior subacromial margin of the humeral heads bilaterally with no bone collapse.       ASSESSMENT:     1. Shoulder pain, chronic, left   2.   AVN humeral head left   Scapular dyskinesia        PLAN:    1.Will get new MRI to evaluate AVN area for surgical planning. Last MRI was over 2 years old.      2. Try voltaren gel to left shoulder area and ice compresses to help pain.      3. Likely surgical management    4. Will call her with MRI results.     PLAN:   Treatment options were discussed with the patient about their injury today.  I reviewed the MRI images and relevant anatomy with the patient and what this means for the injured left shoulder     We discussed both non-operative and operative options for the patient's left shoulder AVN and the risks and benefits of each.     I feel like she would benefit from surgical management at this point.  After a discussion of specific risks and benefits, she has made the decision to proceed with surgery.       These risks include but are  not limited to bleeding, infection, scarring, damage to neurovascular structures, damage to cartilage, stiffness, blood clots, pulmonary embolism, swelling, compartment syndrome, need for further surgery, and the risks of anesthesia.    she verbalized her understanding of these risks and wished to proceed with surgery.     We also had a detailed discussion of her expectation level for this procedure as well as any limitations at home or work and with exercising following surgery.      The spectrum of treatment options were discussed with the patient, including nonoperative and operative options.  After thorough discussion, the patient has elected to undergo surgical treatment to include:     Left              a. Shoulder arthroplasty humeral head with DJO CS Edge              b. Shoulder arthroscopic biceps tenodesis     We will get the patient scheduled for this at the patient's convenience around their work schedule.     The patient was given the opportunity to ask questions and all questions were answered, pt will contact us for questions or concerns in the interim.    All patients questions were answered. Patient was advised to call us with any concerns or questions.

## 2023-04-29 ENCOUNTER — PATIENT MESSAGE (OUTPATIENT)
Dept: DERMATOLOGY | Facility: CLINIC | Age: 43
End: 2023-04-29
Payer: COMMERCIAL

## 2023-05-03 DIAGNOSIS — Z71.89 COMPLEX CARE COORDINATION: ICD-10-CM

## 2023-05-04 ENCOUNTER — PATIENT MESSAGE (OUTPATIENT)
Dept: GASTROENTEROLOGY | Facility: CLINIC | Age: 43
End: 2023-05-04
Payer: COMMERCIAL

## 2023-05-04 ENCOUNTER — TELEPHONE (OUTPATIENT)
Dept: SPORTS MEDICINE | Facility: CLINIC | Age: 43
End: 2023-05-04
Payer: COMMERCIAL

## 2023-05-04 ENCOUNTER — OFFICE VISIT (OUTPATIENT)
Dept: DERMATOLOGY | Facility: CLINIC | Age: 43
End: 2023-05-04
Payer: COMMERCIAL

## 2023-05-04 DIAGNOSIS — D22.5 MULTIPLE BENIGN MELANOCYTIC NEVI OF UPPER EXTREMITY, LOWER EXTREMITY, AND TRUNK: ICD-10-CM

## 2023-05-04 DIAGNOSIS — D22.4 MELANOCYTIC NEVUS OF SCALP: ICD-10-CM

## 2023-05-04 DIAGNOSIS — D22.30 MELANOCYTIC NEVI OF FACE: Primary | ICD-10-CM

## 2023-05-04 DIAGNOSIS — Z12.83 SCREENING EXAM FOR SKIN CANCER: ICD-10-CM

## 2023-05-04 DIAGNOSIS — L81.4 LENTIGINES: ICD-10-CM

## 2023-05-04 DIAGNOSIS — Z86.018 HISTORY OF DYSPLASTIC NEVUS: ICD-10-CM

## 2023-05-04 DIAGNOSIS — D22.60 MULTIPLE BENIGN MELANOCYTIC NEVI OF UPPER EXTREMITY, LOWER EXTREMITY, AND TRUNK: ICD-10-CM

## 2023-05-04 DIAGNOSIS — D22.70 MULTIPLE BENIGN MELANOCYTIC NEVI OF UPPER EXTREMITY, LOWER EXTREMITY, AND TRUNK: ICD-10-CM

## 2023-05-04 PROCEDURE — 1159F PR MEDICATION LIST DOCUMENTED IN MEDICAL RECORD: ICD-10-PCS | Mod: CPTII,S$GLB,, | Performed by: DERMATOLOGY

## 2023-05-04 PROCEDURE — 1160F RVW MEDS BY RX/DR IN RCRD: CPT | Mod: CPTII,S$GLB,, | Performed by: DERMATOLOGY

## 2023-05-04 PROCEDURE — 1159F MED LIST DOCD IN RCRD: CPT | Mod: CPTII,S$GLB,, | Performed by: DERMATOLOGY

## 2023-05-04 PROCEDURE — 1160F PR REVIEW ALL MEDS BY PRESCRIBER/CLIN PHARMACIST DOCUMENTED: ICD-10-PCS | Mod: CPTII,S$GLB,, | Performed by: DERMATOLOGY

## 2023-05-04 PROCEDURE — 99213 OFFICE O/P EST LOW 20 MIN: CPT | Mod: S$GLB,,, | Performed by: DERMATOLOGY

## 2023-05-04 PROCEDURE — 99213 PR OFFICE/OUTPT VISIT, EST, LEVL III, 20-29 MIN: ICD-10-PCS | Mod: S$GLB,,, | Performed by: DERMATOLOGY

## 2023-05-04 NOTE — PROGRESS NOTES
"  Patient Information  Name: Ivy Salgado  : 1980  MRN: [id number]     Referring Physician:  No ref. provider found   Primary Care Physician:  Kalia Astorga MD   Date of Visit: 2023      Subjective:     History of Present lllness:    Ivy Salgado is a 42 y.o. female who presents with a chief complaint of moles.  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  Patient is here today for a "mole" check.   Today, patient has no additional complaints. Denies any new, changing, or symptomatic lesions on the skin.    Patient was last seen: 2022.  Prior notes by myself reviewed.   Clinical documentation obtained by nursing staff reviewed.    Review of Systems    Objective:   Physical Exam   Constitutional: She appears well-developed and well-nourished. No distress.   HENT:   Mouth/Throat: Lips normal.    Eyes: Lids are normal.  No conjunctival no injection.   Musculoskeletal: Lymphadenopathy:      Cervical: No cervical adenopathy.      Upper Body:   No axillary adenopathy present.No supraclavicular adenopathy is present.      Lower Body:   No inguinal adenopathy.    Lymphadenopathy: No supraclavicular adenopathy is present.     She has no cervical adenopathy.     She has no axillary adenopathy.     She has no inguinal adenopathy.   Neurological: She is alert and oriented to person, place, and time. She is not disoriented.   Psychiatric: She has a normal mood and affect.   Skin:   Areas Examined (abnormalities noted in diagram):   Scalp / Hair Palpated and Inspected  Head / Face Inspection Performed  Neck Inspection Performed  Chest / Axilla Inspection Performed  Abdomen Inspection Performed  Genitals / Buttocks / Groin Inspection Performed  Back Inspection Performed  RUE Inspected  LUE Inspection Performed  RLE Inspected  LLE Inspection " Performed  Nails and Digits Inspection Performed  Gland Inspection Performed                   Diagram Legend     Erythematous scaling macule/papule c/w actinic keratosis       Vascular papule c/w angioma      Pigmented verrucoid papule/plaque c/w seborrheic keratosis      Yellow umbilicated papule c/w sebaceous hyperplasia      Irregularly shaped tan macule c/w lentigo     1-2 mm smooth white papules consistent with Milia      Movable subcutaneous cyst with punctum c/w epidermal inclusion cyst      Subcutaneous movable cyst c/w pilar cyst      Firm pink to brown papule c/w dermatofibroma      Pedunculated fleshy papule(s) c/w skin tag(s)      Evenly pigmented macule c/w junctional nevus     Mildly variegated pigmented, slightly irregular-bordered macule c/w mildly atypical nevus      Flesh colored to evenly pigmented papule c/w intradermal nevus       Pink pearly papule/plaque c/w basal cell carcinoma      Erythematous hyperkeratotic cursted plaque c/w SCC      Surgical scar with no sign of skin cancer recurrence      Open and closed comedones      Inflammatory papules and pustules      Verrucoid papule consistent consistent with wart     Erythematous eczematous patches and plaques     Dystrophic onycholytic nail with subungual debris c/w onychomycosis     Umbilicated papule    Erythematous-base heme-crusted tan verrucoid plaque consistent with inflamed seborrheic keratosis     Erythematous Silvery Scaling Plaque c/w Psoriasis     See annotation    No images are attached to the encounter or orders placed in the encounter.      [] Data reviewed  [] Prior external notes reviewed  [] Independent review of test  [] Management discussed with another provider  [] Independent historian    Assessment / Plan:        Melanocytic nevi of face  Melanocytic nevus of scalp  Multiple benign melanocytic nevi of upper extremity, lower extremity, and trunk  Multiple benign-appearing nevi present on exam today. Reassurance provided.  Counseled patient to periodically examine moles and return to clinic if any changes in size, shape, or color are noted or if it becomes symptomatic (bleeding, itching, pain, etc).  Recommend using a broad-spectrum, water-resistant sunscreen with SPF of 30 or higher--reapply every 2 hours. Seek shade, wear sun-protective clothing, and perform regular skin self-exams.    Lentigines  These are benign sun spots which should be monitored for changes. Daily sun protection will reduce the number of new lesions.   Recommend using a broad-spectrum, water-resistant sunscreen with SPF of 30 or higher--reapply every 2 hours. Seek shade, wear sun-protective clothing, and perform regular skin self-exams.    History of dysplastic nevus, severe atypia (early evolving melanoma in situ)  Screening exam for skin cancer  Total body skin examination performed today as noted in physical exam. No lesions suspicious for malignancy were seen.  Recommend using a broad-spectrum, water-resistant sunscreen with SPF of 30 or higher--reapply every 2 hours. Seek shade, wear sun-protective clothing, and perform regular skin self-exams.    Follow up in about 1 year (around 5/4/2024) for TBSE, or sooner if any new problems or changing lesions.      Iris Simon MD, FAAD  [facility]

## 2023-05-04 NOTE — TELEPHONE ENCOUNTER
MRI left shoulder personally reviewed by me and Dr. Babb.   MRI showed AVN of humeral head. She is having severe pain.     PLAN:   Treatment options were discussed with the patient about their injury today.  I reviewed the MRI images and relevant anatomy with the patient and what this means for the injured left shoulder    We discussed both non-operative and operative options for the patient's left shoulder AVN and the risks and benefits of each.    I feel like she would benefit from surgical management at this point.  After a discussion of specific risks and benefits, she has made the decision to proceed with surgery.      These risks include but are not limited to bleeding, infection, scarring, damage to neurovascular structures, damage to cartilage, stiffness, blood clots, pulmonary embolism, swelling, compartment syndrome, need for further surgery, and the risks of anesthesia.    she verbalized her understanding of these risks and wished to proceed with surgery.    We also had a detailed discussion of her expectation level for this procedure as well as any limitations at home or work and with exercising following surgery.     The spectrum of treatment options were discussed with the patient, including nonoperative and operative options.  After thorough discussion, the patient has elected to undergo surgical treatment to include:    Left              a. Shoulder arthroplasty humeral head with DJO CS Edge              b. Shoulder arthroscopic biceps tenodesis    We will get the patient scheduled for this at the patient's convenience around their work schedule.    The patient was given the opportunity to ask questions and all questions were answered, pt will contact us for questions or concerns in the interim.

## 2023-05-08 DIAGNOSIS — F41.1 GENERALIZED ANXIETY DISORDER: ICD-10-CM

## 2023-05-08 DIAGNOSIS — F41.9 ANXIETY: ICD-10-CM

## 2023-05-09 RX ORDER — CLONAZEPAM 1 MG/1
TABLET ORAL
Qty: 75 TABLET | Refills: 0 | Status: SHIPPED | OUTPATIENT
Start: 2023-05-09 | End: 2023-06-05 | Stop reason: SDUPTHER

## 2023-05-09 RX ORDER — ZOLPIDEM TARTRATE 10 MG/1
10 TABLET ORAL NIGHTLY
Qty: 30 TABLET | Refills: 0 | Status: SHIPPED | OUTPATIENT
Start: 2023-05-09 | End: 2023-06-12 | Stop reason: SDUPTHER

## 2023-05-09 NOTE — TELEPHONE ENCOUNTER
Care Due:                  Date            Visit Type   Department     Provider  --------------------------------------------------------------------------------                                EP -                              PRIMARY      [facility] INTERNAL  Last Visit: 03-      CARE ([facility])   MEDICINE       Luis Madden  Next Visit: None Scheduled  None         None Found                                                            Last  Test          Frequency    Reason                     Performed    Due Date  --------------------------------------------------------------------------------    Lipid Panel.  12 months..  mirtazapine..............  03- 03-    [facility] Embedded Care Due Messages. Reference number: [id number].   5/08/2023 9:34:56 PM CDT

## 2023-05-09 NOTE — TELEPHONE ENCOUNTER
Refill Encounter    PCP Visits: Recent Visits  Date Type Provider Dept   06/23/22 Office Visit Kalia Astorga MD [facility] Internal Medicine   Showing recent visits within past 360 days and meeting all other requirements  Future Appointments  No visits were found meeting these conditions.  Showing future appointments within next 720 days and meeting all other requirements     Last 3 Blood Pressure:   BP Readings from Last 3 Encounters:   04/28/23 113/77   03/20/23 118/77   03/13/23 138/82     Preferred Pharmacy:   [facility] #[id number] - [place]  [address]  AT [facility] [address]  [address] [place] [address]  Phone: [phone] Fax: [phone]    [facility] [place] - [address] [facility]  [address] [facility] [address]  Phone: [phone] Fax: [phone]    [facility] [address] [place] [address]  Phone: [phone] Fax: [phone]    [facility]  [place] - [address]  [address] [place] [address]  Phone: [phone] Fax: [phone]    Requested RX:  Requested Prescriptions     Pending Prescriptions Disp Refills    zolpidem (AMBIEN) 10 mg Tab 30 tablet 0     Sig: Take 1 tablet (10 mg total) by mouth every evening.      RX Route: Normal

## 2023-05-09 NOTE — TELEPHONE ENCOUNTER
No care due was identified.  Health [facility] Embedded Care Due Messages. Reference number: [id number].   5/08/2023 9:38:42 PM CDT

## 2023-05-09 NOTE — TELEPHONE ENCOUNTER
Refill Encounter    PCP Visits: Recent Visits  Date Type Provider Dept   06/23/22 Office Visit Kalia Astorga MD [facility] Internal Medicine   Showing recent visits within past 360 days and meeting all other requirements  Future Appointments  No visits were found meeting these conditions.  Showing future appointments within next 720 days and meeting all other requirements     Last 3 Blood Pressure:   BP Readings from Last 3 Encounters:   04/28/23 113/77   03/20/23 118/77   03/13/23 138/82     Preferred Pharmacy:   [facility] #[id number] - [place]  [address]  AT [facility] [address]  [address] [place] [address]  Phone: [phone] Fax: [phone]    [facility] [place] - [address] [facility] [address]  Phone: [phone] Fax: [phone]    [facility] [address] [place] [address]  Phone: [phone] Fax: [phone]    [facility]  [place] - [address]  [address] [place] [address]  Phone: [phone] Fax: [phone]    Requested RX:  Requested Prescriptions     Pending Prescriptions Disp Refills    clonazePAM (KLONOPIN) 1 MG tablet 75 tablet 0     Sig: TAKE 1 AND 1/2 TABLETS BY MOUTH TWICE DAILY AS NEEDED FOR ANXIETY      RX Route: Normal

## 2023-05-10 NOTE — PROGRESS NOTES
Called for patient's xray   Per Dr. Copeland:   Decrease bystolic to 2.5 mg once daily in the AM.     Allegra agreed to the above and will continue to follow blood pressures.

## 2023-05-11 ENCOUNTER — OFFICE VISIT (OUTPATIENT)
Dept: OPTOMETRY | Facility: CLINIC | Age: 43
End: 2023-05-11
Payer: COMMERCIAL

## 2023-05-11 ENCOUNTER — HOSPITAL ENCOUNTER (OUTPATIENT)
Dept: RADIOLOGY | Facility: CLINIC | Age: 43
Discharge: HOME | End: 2023-05-11
Attending: INTERNAL MEDICINE
Payer: COMMERCIAL

## 2023-05-11 ENCOUNTER — HOSPITAL ENCOUNTER (OUTPATIENT)
Dept: RADIOLOGY | Facility: HOSPITAL | Age: 43
Discharge: HOME | End: 2023-05-11
Attending: INTERNAL MEDICINE
Payer: COMMERCIAL

## 2023-05-11 ENCOUNTER — CLINICAL SUPPORT (OUTPATIENT)
Dept: OBSTETRICS AND GYNECOLOGY | Facility: CLINIC | Age: 43
End: 2023-05-11
Payer: COMMERCIAL

## 2023-05-11 ENCOUNTER — HOSPITAL ENCOUNTER (OUTPATIENT)
Dept: RADIOLOGY | Facility: OTHER | Age: 43
Discharge: HOME | End: 2023-05-11
Attending: PHYSICIAN ASSISTANT
Payer: COMMERCIAL

## 2023-05-11 DIAGNOSIS — H52.13 MYOPIA OF BOTH EYES WITH ASTIGMATISM AND PRESBYOPIA: ICD-10-CM

## 2023-05-11 DIAGNOSIS — M25.512 LEFT SHOULDER PAIN, UNSPECIFIED CHRONICITY: ICD-10-CM

## 2023-05-11 DIAGNOSIS — M25.512 CHRONIC LEFT SHOULDER PAIN: ICD-10-CM

## 2023-05-11 DIAGNOSIS — M85.89 OSTEOPENIA OF MULTIPLE SITES: ICD-10-CM

## 2023-05-11 DIAGNOSIS — H52.4 MYOPIA OF BOTH EYES WITH ASTIGMATISM AND PRESBYOPIA: ICD-10-CM

## 2023-05-11 DIAGNOSIS — Z01.00 EYE EXAM, ROUTINE: Primary | ICD-10-CM

## 2023-05-11 DIAGNOSIS — Z79.890 HORMONE REPLACEMENT THERAPY (HRT): Primary | ICD-10-CM

## 2023-05-11 DIAGNOSIS — G89.29 CHRONIC LEFT SHOULDER PAIN: ICD-10-CM

## 2023-05-11 DIAGNOSIS — H52.203 MYOPIA OF BOTH EYES WITH ASTIGMATISM AND PRESBYOPIA: ICD-10-CM

## 2023-05-11 DIAGNOSIS — E04.1 THYROID NODULE: ICD-10-CM

## 2023-05-11 PROCEDURE — 77080 DEXA BONE DENSITY SPINE HIP: ICD-10-PCS | Mod: 26,,, | Performed by: INTERNAL MEDICINE

## 2023-05-11 PROCEDURE — 99999 PR PBB SHADOW E&M-EST. PATIENT-LVL I: CPT | Mod: PBBFAC,,,

## 2023-05-11 PROCEDURE — 96372 PR INJECTION,THERAP/PROPH/DIAG2ST, IM OR SUBCUT: ICD-10-PCS | Mod: S$GLB,,, | Performed by: SPECIALIST

## 2023-05-11 PROCEDURE — 99999 PR PBB SHADOW E&M-EST. PATIENT-LVL I: ICD-10-PCS | Mod: PBBFAC,,,

## 2023-05-11 PROCEDURE — 92014 COMPRE OPH EXAM EST PT 1/>: CPT | Mod: S$GLB,,, | Performed by: OPTOMETRIST

## 2023-05-11 PROCEDURE — 92015 DETERMINE REFRACTIVE STATE: CPT | Mod: S$GLB,,, | Performed by: OPTOMETRIST

## 2023-05-11 PROCEDURE — 76536 US SOFT TISSUE HEAD NECK THYROID: ICD-10-PCS | Mod: 26,,, | Performed by: INTERNAL MEDICINE

## 2023-05-11 PROCEDURE — 92014 PR EYE EXAM, EST PATIENT,COMPREHESV: ICD-10-PCS | Mod: S$GLB,,, | Performed by: OPTOMETRIST

## 2023-05-11 PROCEDURE — 77080 DXA BONE DENSITY AXIAL: CPT | Mod: TC

## 2023-05-11 PROCEDURE — 76536 US EXAM OF HEAD AND NECK: CPT | Mod: 26,,, | Performed by: INTERNAL MEDICINE

## 2023-05-11 PROCEDURE — 73221 MRI JOINT UPR EXTREM W/O DYE: CPT | Mod: 26,LT,, | Performed by: RADIOLOGY

## 2023-05-11 PROCEDURE — 99999 PR PBB SHADOW E&M-EST. PATIENT-LVL III: CPT | Mod: PBBFAC,,, | Performed by: OPTOMETRIST

## 2023-05-11 PROCEDURE — 73221 MRI SHOULDER WITHOUT CONTRAST LEFT: ICD-10-PCS | Mod: 26,LT,, | Performed by: RADIOLOGY

## 2023-05-11 PROCEDURE — 99999 PR PBB SHADOW E&M-EST. PATIENT-LVL III: ICD-10-PCS | Mod: PBBFAC,,, | Performed by: OPTOMETRIST

## 2023-05-11 PROCEDURE — 92015 PR REFRACTION: ICD-10-PCS | Mod: S$GLB,,, | Performed by: OPTOMETRIST

## 2023-05-11 PROCEDURE — 77080 DXA BONE DENSITY AXIAL: CPT | Mod: 26,,, | Performed by: INTERNAL MEDICINE

## 2023-05-11 PROCEDURE — 76536 US EXAM OF HEAD AND NECK: CPT | Mod: TC

## 2023-05-11 PROCEDURE — 73221 MRI JOINT UPR EXTREM W/O DYE: CPT | Mod: TC,LT

## 2023-05-11 PROCEDURE — 96372 THER/PROPH/DIAG INJ SC/IM: CPT | Mod: S$GLB,,, | Performed by: SPECIALIST

## 2023-05-11 RX ADMIN — ESTRADIOL VALERATE 20 MG: 40 INJECTION INTRAMUSCULAR at 01:05

## 2023-05-11 NOTE — PROGRESS NOTES
HPI    DLS: 8/6/20    No eyedrops  No eye surgery     Pt here for yearly eye exam.   Pt states she gets occasional headaches.    Pt states floaters OU after a hot shower and lack of sleep.    Pt denies eye pain OU.    Pt states some tearing OU x 4 months.    Pt denies itching or burning OU .   Last edited by Dony Coffey, OD on 5/11/2023 10:23 AM.            Assessment /Plan     For exam results, see Encounter Report.    Eye exam, routine  -No retinopathy noted OD, OS    Myopia of both eyes with astigmatism and presbyopia  Eyeglass Final Rx       Eyeglass Final Rx         Sphere Cylinder    Right +0.75 Sphere    Left +0.75 Sphere      Type: COMPUTER    Expiration Date: 5/11/2024                      RTC 1 yr

## 2023-05-11 NOTE — PROGRESS NOTES
Here for hormone therapy injection, no complaints at this time. Injection given as ordered, tolerated well no reports of pain prior to or post injection. Advised to return to clinic as scheduled      Site:SURYA      Delestrogen: 20 mg    CLINIC SUPPLIED MEDICATION

## 2023-05-16 ENCOUNTER — PATIENT MESSAGE (OUTPATIENT)
Dept: SPORTS MEDICINE | Facility: CLINIC | Age: 43
End: 2023-05-16
Payer: COMMERCIAL

## 2023-05-16 ENCOUNTER — PATIENT MESSAGE (OUTPATIENT)
Dept: GASTROENTEROLOGY | Facility: CLINIC | Age: 43
End: 2023-05-16
Payer: COMMERCIAL

## 2023-05-16 DIAGNOSIS — K50.00 CROHN'S DISEASE OF SMALL INTESTINE WITHOUT COMPLICATION: Primary | Chronic | ICD-10-CM

## 2023-05-17 ENCOUNTER — PATIENT MESSAGE (OUTPATIENT)
Dept: ELECTROPHYSIOLOGY | Facility: CLINIC | Age: 43
End: 2023-05-17
Payer: COMMERCIAL

## 2023-05-18 ENCOUNTER — PATIENT MESSAGE (OUTPATIENT)
Dept: SPORTS MEDICINE | Facility: CLINIC | Age: 43
End: 2023-05-18
Payer: COMMERCIAL

## 2023-05-18 ENCOUNTER — TELEPHONE (OUTPATIENT)
Dept: SPORTS MEDICINE | Facility: CLINIC | Age: 43
End: 2023-05-18
Payer: COMMERCIAL

## 2023-05-18 ENCOUNTER — TELEPHONE (OUTPATIENT)
Dept: ELECTROPHYSIOLOGY | Facility: CLINIC | Age: 43
End: 2023-05-18
Payer: COMMERCIAL

## 2023-05-18 DIAGNOSIS — M25.512 CHRONIC LEFT SHOULDER PAIN: Primary | ICD-10-CM

## 2023-05-18 DIAGNOSIS — G89.29 CHRONIC LEFT SHOULDER PAIN: Primary | ICD-10-CM

## 2023-05-18 NOTE — TELEPHONE ENCOUNTER
Dr Monsalve will clear pt for knee surgery:      We can absolutely clear her for knee surgery from a cardiac and EP standpoint. The one caveat is anesthesia should avoid QT prolonging anesthetic agents.

## 2023-05-18 NOTE — TELEPHONE ENCOUNTER
Spoke to patient about Left shoulder surgery. She is good with proceeding with previously discussed surgical plan. She has chosen date of July 18th. She understands that she will need cardiology and PCP clearance prior to surgery. She will not need any clearance for her Crohn's dsx which is well controlled.     Spoke to Jonas with [facility] and will need [facility] for surgical planning.     All patients questions were answered. Patient was advised to call us with any concerns or questions.

## 2023-05-22 ENCOUNTER — PATIENT MESSAGE (OUTPATIENT)
Dept: SPORTS MEDICINE | Facility: CLINIC | Age: 43
End: 2023-05-22
Payer: COMMERCIAL

## 2023-05-25 ENCOUNTER — PROCEDURE VISIT (OUTPATIENT)
Dept: DERMATOLOGY | Facility: CLINIC | Age: 43
End: 2023-05-25
Payer: COMMERCIAL

## 2023-05-25 DIAGNOSIS — Z41.1 ENCOUNTER FOR COSMETIC PROCEDURE: Primary | ICD-10-CM

## 2023-05-25 PROCEDURE — 11950 SUBQ NJX FILLING MATRL 1CC/<: CPT | Mod: CSM,S$GLB,, | Performed by: DERMATOLOGY

## 2023-05-25 PROCEDURE — 99499 UNLISTED E&M SERVICE: CPT | Mod: CSM,S$GLB,, | Performed by: DERMATOLOGY

## 2023-05-25 PROCEDURE — 11950 PR JUVEDERM VOLUMA 1ML: ICD-10-PCS | Mod: CSM,S$GLB,, | Performed by: DERMATOLOGY

## 2023-05-25 PROCEDURE — 11954 PR JUVEDERM ULTRA: ICD-10-PCS | Mod: CSM,S$GLB,, | Performed by: DERMATOLOGY

## 2023-05-25 PROCEDURE — 99499 NO LOS: ICD-10-PCS | Mod: CSM,S$GLB,, | Performed by: DERMATOLOGY

## 2023-05-25 PROCEDURE — 11954 SUBQ NJX FIL MATRL>10.0 CC: CPT | Mod: CSM,S$GLB,, | Performed by: DERMATOLOGY

## 2023-05-25 NOTE — PROGRESS NOTES
Pt presents today for cosmetic treatment of wrinkles and folds on the face with hyaluronic .    Discussed risks, benefits, and side effects of hyaluronic acid injections, including bruising, bleeding, redness, swelling, allergic reaction, pain, tenderness at injection site, infection, immune rejection of filler. Verbal and written consent were obtained from the patient.     A total of 1 mL of Juvederm Voluma XC was injected into the bilateral zygomatic arches with a 27 gauge needle.     A total of 1 mL of Juvederm Ultra XC was injected into the bilateral nasolabial folds, melomental folds, prejowl sulci, and oral commissures with a 30 gauge needle.    There were no complications, and the patient tolerated the procedure well. Post-injection instructions were provided to the patient.    Juvederm Ultra XC  Lot #: [id number]  Exp date: 2023-12-28    Juvederm Voluma XC  Lot #: [id number]  Exp date: 2024-01-20

## 2023-05-26 ENCOUNTER — PES CALL (OUTPATIENT)
Dept: ADMINISTRATIVE | Facility: CLINIC | Age: 43
End: 2023-05-26
Payer: COMMERCIAL

## 2023-05-29 DIAGNOSIS — K50.819 CROHN'S DISEASE OF BOTH SMALL AND LARGE INTESTINE WITH COMPLICATION: ICD-10-CM

## 2023-05-29 DIAGNOSIS — Z79.899 ENCOUNTER FOR LONG-TERM (CURRENT) USE OF HIGH-RISK MEDICATION: ICD-10-CM

## 2023-05-29 RX ORDER — DRONABINOL 5 MG/1
5 CAPSULE ORAL
Qty: 60 CAPSULE | Refills: 1 | Status: SHIPPED | OUTPATIENT
Start: 2023-05-29 | End: 2023-08-17 | Stop reason: SDUPTHER

## 2023-06-01 ENCOUNTER — HOSPITAL ENCOUNTER (OUTPATIENT)
Dept: RADIOLOGY | Facility: HOSPITAL | Age: 43
Discharge: HOME | End: 2023-06-01
Attending: PHYSICIAN ASSISTANT
Payer: COMMERCIAL

## 2023-06-01 DIAGNOSIS — M25.512 CHRONIC LEFT SHOULDER PAIN: ICD-10-CM

## 2023-06-01 DIAGNOSIS — M75.22 BICEPS TENDONITIS ON LEFT: ICD-10-CM

## 2023-06-01 DIAGNOSIS — G89.29 CHRONIC LEFT SHOULDER PAIN: ICD-10-CM

## 2023-06-01 DIAGNOSIS — M87.019 AVASCULAR NECROSIS OF BONE OF SHOULDER: Primary | ICD-10-CM

## 2023-06-01 PROCEDURE — 73200 CT UPPER EXTREMITY W/O DYE: CPT | Mod: TC,LT

## 2023-06-01 PROCEDURE — 73200 CT UPPER EXTREMITY W/O DYE: CPT | Mod: 26,LT,, | Performed by: RADIOLOGY

## 2023-06-01 PROCEDURE — 73200 CT SHOULDER WITHOUT CONTRAST LEFT: ICD-10-PCS | Mod: 26,LT,, | Performed by: RADIOLOGY

## 2023-06-02 RX ORDER — PROMETHAZINE HYDROCHLORIDE 25 MG/1
TABLET ORAL
Qty: 30 TABLET | Refills: 1 | Status: CANCELLED | OUTPATIENT
Start: 2023-06-02

## 2023-06-05 ENCOUNTER — PATIENT MESSAGE (OUTPATIENT)
Dept: SPORTS MEDICINE | Facility: CLINIC | Age: 43
End: 2023-06-05
Payer: COMMERCIAL

## 2023-06-05 DIAGNOSIS — F41.1 GENERALIZED ANXIETY DISORDER: ICD-10-CM

## 2023-06-05 RX ORDER — PROMETHAZINE HYDROCHLORIDE 25 MG/1
TABLET ORAL
Qty: 30 TABLET | Refills: 1 | Status: SHIPPED | OUTPATIENT
Start: 2023-06-05 | End: 2023-08-21 | Stop reason: SDUPTHER

## 2023-06-05 RX ORDER — CLONAZEPAM 1 MG/1
TABLET ORAL
Qty: 75 TABLET | Refills: 0 | Status: SHIPPED | OUTPATIENT
Start: 2023-06-05 | End: 2023-06-29 | Stop reason: SDUPTHER

## 2023-06-05 NOTE — TELEPHONE ENCOUNTER
Refill Encounter    PCP Visits: Recent Visits  Date Type Provider Dept   06/23/22 Office Visit Kalia Astorga MD [facility] Internal Medicine   Showing recent visits within past 360 days and meeting all other requirements  Future Appointments  Date Type Provider Dept   06/30/23 Appointment Kalia Astorga MD [facility] Internal Medicine   Showing future appointments within next 720 days and meeting all other requirements     Last 3 Blood Pressure:   BP Readings from Last 3 Encounters:   04/28/23 113/77   03/20/23 118/77   03/13/23 138/82     Preferred Pharmacy:   [facility] #[id number] - [place] - [address] AT [facility] [address]  [address] [place] [address]  Phone: [phone] Fax: [phone]    [facility] [address] [address]  [address] [facility] [address]  Phone: [phone] Fax: [phone]    [facility]  [address] [facility] [address]  Phone: [phone] Fax: [phone]    [facility]  [place]  [address]  [address] [place] [address]  Phone: [phone] Fax: [phone]    [facility]  [address]  [place] [address]  Phone: [phone] Fax: [phone]    Requested RX:  Requested Prescriptions     Pending Prescriptions Disp Refills    clonazePAM (KLONOPIN) 1 MG tablet 75 tablet 0     Sig: TAKE 1 AND 1/2 TABLETS BY MOUTH TWICE DAILY AS NEEDED FOR ANXIETY      RX Route: Normal

## 2023-06-05 NOTE — TELEPHONE ENCOUNTER
No care due was identified.  [facility] Embedded Care Due Messages. Reference number: [id number].   6/05/2023 3:38:48 PM CDT

## 2023-06-07 ENCOUNTER — PATIENT MESSAGE (OUTPATIENT)
Dept: OBSTETRICS AND GYNECOLOGY | Facility: CLINIC | Age: 43
End: 2023-06-07
Payer: COMMERCIAL

## 2023-06-07 RX ORDER — VALACYCLOVIR HYDROCHLORIDE 1 G/1
TABLET, FILM COATED ORAL
Qty: 10 TABLET | Refills: 3 | Status: SHIPPED | OUTPATIENT
Start: 2023-06-07 | End: 2023-08-27 | Stop reason: SDUPTHER

## 2023-06-07 RX ORDER — ACYCLOVIR 50 MG/G
CREAM TOPICAL
Qty: 5 G | Refills: 1 | Status: ON HOLD | OUTPATIENT
Start: 2023-06-07 | End: 2023-07-18 | Stop reason: HOSPADM

## 2023-06-08 ENCOUNTER — TELEPHONE (OUTPATIENT)
Dept: PHARMACY | Facility: CLINIC | Age: 43
End: 2023-06-08
Payer: COMMERCIAL

## 2023-06-09 ENCOUNTER — PATIENT MESSAGE (OUTPATIENT)
Dept: SPORTS MEDICINE | Facility: CLINIC | Age: 43
End: 2023-06-09
Payer: COMMERCIAL

## 2023-06-12 ENCOUNTER — TELEPHONE (OUTPATIENT)
Dept: OBSTETRICS AND GYNECOLOGY | Facility: CLINIC | Age: 43
End: 2023-06-12
Payer: COMMERCIAL

## 2023-06-12 ENCOUNTER — TELEPHONE (OUTPATIENT)
Dept: SPORTS MEDICINE | Facility: CLINIC | Age: 43
End: 2023-06-12
Payer: COMMERCIAL

## 2023-06-12 DIAGNOSIS — M25.512 CHRONIC LEFT SHOULDER PAIN: ICD-10-CM

## 2023-06-12 DIAGNOSIS — G89.29 CHRONIC LEFT SHOULDER PAIN: ICD-10-CM

## 2023-06-12 DIAGNOSIS — F41.9 ANXIETY: ICD-10-CM

## 2023-06-12 DIAGNOSIS — M87.019 AVASCULAR NECROSIS OF BONE OF SHOULDER: ICD-10-CM

## 2023-06-12 DIAGNOSIS — M75.22 BICEPS TENDONITIS ON LEFT: Primary | ICD-10-CM

## 2023-06-12 RX ORDER — ACYCLOVIR 50 MG/G
OINTMENT TOPICAL
Qty: 15 G | Refills: 0 | Status: SHIPPED | OUTPATIENT
Start: 2023-06-12 | End: 2023-07-10 | Stop reason: SDUPTHER

## 2023-06-12 RX ORDER — LIDOCAINE 50 MG/G
1 PATCH TOPICAL DAILY
Qty: 21 PATCH | Refills: 0 | Status: SHIPPED | OUTPATIENT
Start: 2023-06-12 | End: 2023-08-30

## 2023-06-12 NOTE — TELEPHONE ENCOUNTER
No care due was identified.  Health [facility] Embedded Care Due Messages. Reference number: [id number].   6/12/2023 5:37:21 PM CDT

## 2023-06-12 NOTE — TELEPHONE ENCOUNTER
Called [facility] to verify prescription directions. Acyclovir 5% ointment- apply thin layer to affected area daily. Pharmacist verbalized understanding

## 2023-06-12 NOTE — TELEPHONE ENCOUNTER
----- Message from Taylor Garrison sent at 6/12/2023 11:39 AM CDT -----  Regarding: pharmacy  Contact: Pamella with [facility]  Type:  Pharmacy Calling to Clarify an RX    Name of Caller:  Pamella  Pharmacy Name:  [facility]  Prescription Name:  acyclovir 5% (ZOVIRAX) 5 % ointment  What do they need to clarify?:  directions  Best Call Back Number:  [phone]  Additional Information:  Please call Pamella to advise. Thanks!

## 2023-06-12 NOTE — TELEPHONE ENCOUNTER
Refill Routing Note   Medication(s) are not appropriate for processing by [facility] for the following reason(s):      Medication outside of protocol    [facility] action(s):  Route Care Due:  None identified          Appointments  past 12m or future 3m with PCP    Date Provider   Last Visit   6/23/2022 Kalia Astorga MD   Next Visit   6/30/2023 Kalia Astorga MD   ED visits in past 90 days: 0        Note composed:6:14 PM 06/12/2023

## 2023-06-13 ENCOUNTER — TELEPHONE (OUTPATIENT)
Dept: OBSTETRICS AND GYNECOLOGY | Facility: CLINIC | Age: 43
End: 2023-06-13
Payer: COMMERCIAL

## 2023-06-13 RX ORDER — ZOLPIDEM TARTRATE 10 MG/1
10 TABLET ORAL NIGHTLY
Qty: 30 TABLET | Refills: 0 | Status: SHIPPED | OUTPATIENT
Start: 2023-06-13 | End: 2023-07-13 | Stop reason: SDUPTHER

## 2023-06-13 NOTE — TELEPHONE ENCOUNTER
----- Message from Driss Candelaria sent at 6/13/2023 10:32 AM CDT -----  Type:  Pharmacy Calling to Clarify an RX    Name of Caller:  Pamella Barreto   Pharmacy Name:    [id number] - [place] - [address]  AT [facility] [address]  [address] [place] [address]  Phone: [phone] Fax: [phone]          Prescription Name:  acyclovir 5% (ZOVIRAX) 5 % ointment  What do they need to clarify?:  Directions  Best Call Back Number:  [phone]   Additional Information:

## 2023-06-15 ENCOUNTER — PATIENT MESSAGE (OUTPATIENT)
Dept: ELECTROPHYSIOLOGY | Facility: CLINIC | Age: 43
End: 2023-06-15
Payer: COMMERCIAL

## 2023-06-15 ENCOUNTER — PATIENT MESSAGE (OUTPATIENT)
Dept: SPORTS MEDICINE | Facility: CLINIC | Age: 43
End: 2023-06-15
Payer: COMMERCIAL

## 2023-06-16 ENCOUNTER — PATIENT MESSAGE (OUTPATIENT)
Dept: SPORTS MEDICINE | Facility: CLINIC | Age: 43
End: 2023-06-16
Payer: COMMERCIAL

## 2023-06-16 ENCOUNTER — TELEPHONE (OUTPATIENT)
Dept: SPORTS MEDICINE | Facility: CLINIC | Age: 43
End: 2023-06-16
Payer: COMMERCIAL

## 2023-06-16 NOTE — TELEPHONE ENCOUNTER
----- Message from Yuko Parra MA sent at 6/16/2023  9:15 AM CDT -----  Regarding: leave paperwork  Good morning,   I saw you sent a message to patient on 6/5  regarding [employer] paperwork. Please see message below sent in by patient, may you assist her with this.     Thanks,   Yuko     Per patient:  So I got a denial that the paperwork wasn't received for leave.

## 2023-06-16 NOTE — TELEPHONE ENCOUNTER
Spoke to patient. We did not receive FMLA forms to complete for her. Only the Employee Health Consideration form.     Patient will email forms to us at [email or web address]    Blanca Amedee   Athletic   [facility]

## 2023-06-19 ENCOUNTER — HOSPITAL ENCOUNTER (OUTPATIENT)
Dept: CARDIOLOGY | Facility: CLINIC | Age: 43
Discharge: HOME | End: 2023-06-19
Payer: COMMERCIAL

## 2023-06-19 DIAGNOSIS — I49.8 OTHER SPECIFIED CARDIAC ARRHYTHMIAS: ICD-10-CM

## 2023-06-19 PROCEDURE — 93010 ELECTROCARDIOGRAM REPORT: CPT | Mod: S$GLB,,, | Performed by: INTERNAL MEDICINE

## 2023-06-19 PROCEDURE — 93010 RHYTHM STRIP: ICD-10-PCS | Mod: S$GLB,,, | Performed by: INTERNAL MEDICINE

## 2023-06-19 PROCEDURE — 93005 RHYTHM STRIP: ICD-10-PCS | Mod: S$GLB,,, | Performed by: INTERNAL MEDICINE

## 2023-06-19 PROCEDURE — 93005 ELECTROCARDIOGRAM TRACING: CPT | Mod: S$GLB,,, | Performed by: INTERNAL MEDICINE

## 2023-06-22 ENCOUNTER — PATIENT MESSAGE (OUTPATIENT)
Dept: SPORTS MEDICINE | Facility: CLINIC | Age: 43
End: 2023-06-22
Payer: COMMERCIAL

## 2023-06-22 NOTE — PLAN OF CARE
Detail Level: Simple Problem: Patient Care Overview  Goal: Plan of Care Review  Outcome: Ongoing (interventions implemented as appropriate)   09/07/18 0223   Coping/Psychosocial   Plan Of Care Reviewed With patient       Problem: Fall Risk (Adult)  Goal: Absence of Falls  Patient will demonstrate the desired outcomes by discharge/transition of care.  Outcome: Ongoing (interventions implemented as appropriate)   09/07/18 0223   Fall Risk (Adult)   Absence of Falls making progress toward outcome          Bolus Thickness In Cm: 0.6-0.9 Custom Bolus Type: custom mixed, molded, and shaped Position: supine Closing Statement (Will Not Render If Left Blank): Working with the physician, the therapist adjusted the machine gantry, table, and collimator and adjusted patient positioning to allow an appositional electron beam. SSD measurements were verified using the projected optical distance indicator light and room lasers. The field was positioned at 100cm SSD to the top of the bolus material. The machine parameters were recorded. The treatment light field was photographed projected onto the patient's skin. Further digital photographs were taken and will be used as a reference.

## 2023-06-23 ENCOUNTER — PATIENT MESSAGE (OUTPATIENT)
Dept: OBSTETRICS AND GYNECOLOGY | Facility: CLINIC | Age: 43
End: 2023-06-23
Payer: COMMERCIAL

## 2023-06-23 ENCOUNTER — TELEPHONE (OUTPATIENT)
Dept: SPORTS MEDICINE | Facility: CLINIC | Age: 43
End: 2023-06-23
Payer: COMMERCIAL

## 2023-06-23 DIAGNOSIS — M25.512 CHRONIC LEFT SHOULDER PAIN: ICD-10-CM

## 2023-06-23 DIAGNOSIS — M87.019 AVASCULAR NECROSIS OF BONE OF SHOULDER: Primary | ICD-10-CM

## 2023-06-23 DIAGNOSIS — G89.29 CHRONIC LEFT SHOULDER PAIN: ICD-10-CM

## 2023-06-23 RX ORDER — CYCLOBENZAPRINE HCL 10 MG
10 TABLET ORAL 3 TIMES DAILY PRN
Qty: 21 TABLET | Refills: 0 | Status: SHIPPED | OUTPATIENT
Start: 2023-06-23 | End: 2023-07-03

## 2023-06-26 DIAGNOSIS — Z12.31 VISIT FOR SCREENING MAMMOGRAM: Primary | ICD-10-CM

## 2023-06-26 NOTE — PROGRESS NOTES
Subjective:       Patient ID: Ivy Salgado is a 43 y.o. female.    Chief Complaint: Crohn's Disease    Pt here for preop for upcoming shoulder surgery. She has tolerated anesthesia in the past without known major complications. She is active and able to achieve >4 METS (push mows and raking yard) without cp/sob/angina.     She has had issues with recurrent UTI in the past. Also with h/o ureteral stones with h/o stent and removal. She has f/u with urology.    Pt had L adnexal mass and saw GYN-onc for ex lap/BSO; mass was benign. 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 Per GYN, this will be followed but no further intervention was required.     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 She has regular f/u with derm.       She has anxiety which is fairly well controlled with current meds. She uses klonopin bid; we discussed continuing to cut back; currently getting 75 tablets per month. She was seeing psychiatry but still trying to est with new provider due to insurance change. No SI/HI.     Crohn's is well controlled. She has some abd pain with nausea and occasional vomiting for which she has d/w GI but doesn't feel this is active crohns. No fever. She was on entyvio but has not been on this for 3 years as she was having recurrent UTIs; last GI note says they will hold on tx for now considering she has not active disease. She sees Dr. Ross with GI regularly who manages her chronic opiates and is on pain contract. She has high output from her stoma which GI is managing but there are no good solutions to this.     GERD is well controlled with protonix. No associated red flag symptoms. She has not tolerated getting off this.    She has osteopenia on DEXA 2/2020. Reiterated importance of ca/d. We discussed other meds but she is not on prednisone now and osteopenia is mild so will hold off.      She has thyroid nodules that were small on last u/s 10/2020 and not meeting criteria for biopsy. Clinically she is euthyroid. Last u/s was 5/2023 and stable with no further surveillance recommended.     She has prolonged QTc prior EKG with neg genetic testing. Also with sinus tach which results in occasional palpitations. Saw cardiology and recommends staying on nadolol. This is stable.     Review of Systems   Constitutional:  Positive for activity change. Negative for fatigue, fever and unexpected weight change.   HENT:  Negative for ear pain, hearing loss, rhinorrhea, sore throat and trouble swallowing.    Eyes:  Negative for discharge and visual disturbance.   Respiratory:  Negative for cough, chest tightness, shortness of breath and wheezing.    Cardiovascular:  Positive for palpitations. Negative for chest pain.   Gastrointestinal:  Positive for diarrhea. Negative for abdominal pain, blood in stool, constipation, nausea and vomiting.   Endocrine: Negative for polydipsia and polyuria.   Genitourinary:  Negative for difficulty urinating, dysuria, frequency, hematuria and menstrual problem.   Musculoskeletal:  Negative for arthralgias, joint swelling and neck pain.   Neurological:  Negative for weakness, numbness and headaches.   Psychiatric/Behavioral:  Negative for confusion and dysphoric mood.      Objective:      Physical Exam  Constitutional:       Appearance: She is well-developed.   HENT:      Head: Normocephalic and atraumatic.      Right Ear: Tympanic membrane, ear canal and external ear normal.      Left Ear: Tympanic membrane, ear canal and external ear normal.      Nose: No mucosal edema or rhinorrhea.      Mouth/Throat:      Pharynx: No oropharyngeal exudate or posterior oropharyngeal erythema.   Eyes:      Pupils: Pupils are equal, round, and reactive to light.   Neck:      Thyroid: No thyroid mass or thyromegaly.      Vascular: No carotid bruit.   Cardiovascular:      Rate and Rhythm: Normal  rate and regular rhythm.      Heart sounds: Normal heart sounds, S1 normal and S2 normal. No murmur heard.  Pulmonary:      Effort: Pulmonary effort is normal.      Breath sounds: Normal breath sounds. No wheezing.   Abdominal:      General: Bowel sounds are normal. There is no distension.      Palpations: Abdomen is soft. There is no mass.      Tenderness: There is no abdominal tenderness.   Musculoskeletal:      Cervical back: Neck supple.   Lymphadenopathy:      Cervical: No cervical adenopathy.   Neurological:      Mental Status: She is alert and oriented to person, place, and time.      Cranial Nerves: No cranial nerve deficit.   Psychiatric:         Judgment: Judgment normal.       Assessment:       1. Crohn's disease of small intestine without complication    2. Generalized anxiety disorder    3. Crohn's disease of small intestine with complication    4. Current mild episode of major depressive disorder without prior episode    5. S/P ileostomy    6. Herpes genitalis in women    7. Osteopenia of multiple sites    8. Multiple thyroid nodules    9. Sinus tachycardia    10. History of recurrent UTI (urinary tract infection)    11. Seizures    12. Complex partial epilepsy with generalization and with intractable epilepsy    13. Gastroesophageal reflux disease without esophagitis    14. Chronic, continuous use of opioids    15. Pelvic pain in female    16. History of prolonged Q-T interval on ECG        Plan:       1. Keep f/u with specialists  2. Prior labs reviewed with pt  3. Pt is medically optimized to proceed with planned procedure; RCRI is 3.9% with 0 risk factors; chronic diseases are stable and well compensated; she has already d/w cardiology who recommended continuing nadolol uninterrupted and informing anesthesia of her h/o prolonged QT and to avoid any offending meds otherwise

## 2023-06-29 DIAGNOSIS — F41.1 GENERALIZED ANXIETY DISORDER: ICD-10-CM

## 2023-06-29 RX ORDER — CLONAZEPAM 1 MG/1
TABLET ORAL
Qty: 75 TABLET | Refills: 0 | Status: SHIPPED | OUTPATIENT
Start: 2023-06-29 | End: 2023-07-31 | Stop reason: SDUPTHER

## 2023-06-29 NOTE — TELEPHONE ENCOUNTER
No care due was identified.  Health [facility] Embedded Care Due Messages. Reference number: [id number].   6/29/2023 8:51:26 AM CDT

## 2023-06-29 NOTE — TELEPHONE ENCOUNTER
Refill Encounter    PCP Visits: Recent Visits  No visits were found meeting these conditions.  Showing recent visits within past 360 days and meeting all other requirements  Future Appointments  Date Type Provider Dept   06/30/23 Appointment Kalia Astorga MD [facility] Internal Medicine   Showing future appointments within next 720 days and meeting all other requirements     Last 3 Blood Pressure:   BP Readings from Last 3 Encounters:   04/28/23 113/77   03/20/23 118/77   03/13/23 138/82     Preferred Pharmacy:   [facility] #[id number] - [place]  [address]  AT [facility] [address]  [address] [place] [address]  Phone: [phone] Fax: [phone]    [facility] [address] [facility]  [address] [facility] [address]  Phone: [phone] Fax: [phone]    [facility]  [address] [facility] [address]  Phone: [phone] Fax: [phone]    [facility]  [place]  [address]  [address] [place] [address]  Phone: [phone] Fax: [phone]    [facility]  [address]  [place] [address]  Phone: [phone] Fax: [phone]    Requested RX:  Requested Prescriptions     Pending Prescriptions Disp Refills    clonazePAM (KLONOPIN) 1 MG tablet 75 tablet 0     Sig: TAKE 1 AND 1/2 TABLETS BY MOUTH TWICE DAILY AS NEEDED FOR ANXIETY      RX Route: Normal

## 2023-06-30 ENCOUNTER — HOSPITAL ENCOUNTER (OUTPATIENT)
Dept: RADIOLOGY | Facility: HOSPITAL | Age: 43
Discharge: HOME | End: 2023-06-30
Attending: SPECIALIST
Payer: COMMERCIAL

## 2023-06-30 ENCOUNTER — OFFICE VISIT (OUTPATIENT)
Dept: INTERNAL MEDICINE | Facility: CLINIC | Age: 43
End: 2023-06-30
Payer: COMMERCIAL

## 2023-06-30 VITALS
BODY MASS INDEX: 20.22 KG/M2 | DIASTOLIC BLOOD PRESSURE: 88 MMHG | WEIGHT: 107.13 LBS | OXYGEN SATURATION: 98 % | HEIGHT: 61 IN | HEART RATE: 65 BPM | SYSTOLIC BLOOD PRESSURE: 116 MMHG

## 2023-06-30 VITALS — BODY MASS INDEX: 20.2 KG/M2 | HEIGHT: 61 IN | WEIGHT: 107 LBS

## 2023-06-30 DIAGNOSIS — F32.0 CURRENT MILD EPISODE OF MAJOR DEPRESSIVE DISORDER WITHOUT PRIOR EPISODE: ICD-10-CM

## 2023-06-30 DIAGNOSIS — K50.00 CROHN'S DISEASE OF SMALL INTESTINE WITHOUT COMPLICATION: Primary | Chronic | ICD-10-CM

## 2023-06-30 DIAGNOSIS — R56.9 SEIZURES: ICD-10-CM

## 2023-06-30 DIAGNOSIS — F41.1 GENERALIZED ANXIETY DISORDER: ICD-10-CM

## 2023-06-30 DIAGNOSIS — Z93.2 S/P ILEOSTOMY: Chronic | ICD-10-CM

## 2023-06-30 DIAGNOSIS — Z87.440 HISTORY OF RECURRENT UTI (URINARY TRACT INFECTION): ICD-10-CM

## 2023-06-30 DIAGNOSIS — Z87.898 HISTORY OF PROLONGED Q-T INTERVAL ON ECG: ICD-10-CM

## 2023-06-30 DIAGNOSIS — K50.019 CROHN'S DISEASE OF SMALL INTESTINE WITH COMPLICATION: ICD-10-CM

## 2023-06-30 DIAGNOSIS — G40.219 COMPLEX PARTIAL EPILEPSY WITH GENERALIZATION AND WITH INTRACTABLE EPILEPSY: ICD-10-CM

## 2023-06-30 DIAGNOSIS — E04.2 MULTIPLE THYROID NODULES: ICD-10-CM

## 2023-06-30 DIAGNOSIS — K21.9 GASTROESOPHAGEAL REFLUX DISEASE WITHOUT ESOPHAGITIS: ICD-10-CM

## 2023-06-30 DIAGNOSIS — F11.90 CHRONIC, CONTINUOUS USE OF OPIOIDS: ICD-10-CM

## 2023-06-30 DIAGNOSIS — A60.09 HERPES GENITALIS IN WOMEN: ICD-10-CM

## 2023-06-30 DIAGNOSIS — R10.2 PELVIC PAIN IN FEMALE: ICD-10-CM

## 2023-06-30 DIAGNOSIS — M85.89 OSTEOPENIA OF MULTIPLE SITES: ICD-10-CM

## 2023-06-30 DIAGNOSIS — Z12.31 VISIT FOR SCREENING MAMMOGRAM: ICD-10-CM

## 2023-06-30 DIAGNOSIS — R00.0 SINUS TACHYCARDIA: ICD-10-CM

## 2023-06-30 PROCEDURE — 77063 BREAST TOMOSYNTHESIS BI: CPT | Mod: 26,,, | Performed by: RADIOLOGY

## 2023-06-30 PROCEDURE — 99214 OFFICE O/P EST MOD 30 MIN: CPT | Mod: S$GLB,,, | Performed by: INTERNAL MEDICINE

## 2023-06-30 PROCEDURE — 3008F PR BODY MASS INDEX (BMI) DOCUMENTED: ICD-10-PCS | Mod: CPTII,S$GLB,, | Performed by: INTERNAL MEDICINE

## 2023-06-30 PROCEDURE — 99999 PR PBB SHADOW E&M-EST. PATIENT-LVL V: ICD-10-PCS | Mod: PBBFAC,,, | Performed by: INTERNAL MEDICINE

## 2023-06-30 PROCEDURE — 1159F PR MEDICATION LIST DOCUMENTED IN MEDICAL RECORD: ICD-10-PCS | Mod: CPTII,S$GLB,, | Performed by: INTERNAL MEDICINE

## 2023-06-30 PROCEDURE — 99999 PR PBB SHADOW E&M-EST. PATIENT-LVL V: CPT | Mod: PBBFAC,,, | Performed by: INTERNAL MEDICINE

## 2023-06-30 PROCEDURE — 77067 SCR MAMMO BI INCL CAD: CPT | Mod: 26,,, | Performed by: RADIOLOGY

## 2023-06-30 PROCEDURE — 3074F PR MOST RECENT SYSTOLIC BLOOD PRESSURE < 130 MM HG: ICD-10-PCS | Mod: CPTII,S$GLB,, | Performed by: INTERNAL MEDICINE

## 2023-06-30 PROCEDURE — 3074F SYST BP LT 130 MM HG: CPT | Mod: CPTII,S$GLB,, | Performed by: INTERNAL MEDICINE

## 2023-06-30 PROCEDURE — 1160F RVW MEDS BY RX/DR IN RCRD: CPT | Mod: CPTII,S$GLB,, | Performed by: INTERNAL MEDICINE

## 2023-06-30 PROCEDURE — 3079F DIAST BP 80-89 MM HG: CPT | Mod: CPTII,S$GLB,, | Performed by: INTERNAL MEDICINE

## 2023-06-30 PROCEDURE — 77067 MAMMO DIGITAL SCREENING BILAT WITH TOMO: ICD-10-PCS | Mod: 26,,, | Performed by: RADIOLOGY

## 2023-06-30 PROCEDURE — 3008F BODY MASS INDEX DOCD: CPT | Mod: CPTII,S$GLB,, | Performed by: INTERNAL MEDICINE

## 2023-06-30 PROCEDURE — 3079F PR MOST RECENT DIASTOLIC BLOOD PRESSURE 80-89 MM HG: ICD-10-PCS | Mod: CPTII,S$GLB,, | Performed by: INTERNAL MEDICINE

## 2023-06-30 PROCEDURE — 1159F MED LIST DOCD IN RCRD: CPT | Mod: CPTII,S$GLB,, | Performed by: INTERNAL MEDICINE

## 2023-06-30 PROCEDURE — 99214 PR OFFICE/OUTPT VISIT, EST, LEVL IV, 30-39 MIN: ICD-10-PCS | Mod: S$GLB,,, | Performed by: INTERNAL MEDICINE

## 2023-06-30 PROCEDURE — 77063 MAMMO DIGITAL SCREENING BILAT WITH TOMO: ICD-10-PCS | Mod: 26,,, | Performed by: RADIOLOGY

## 2023-06-30 PROCEDURE — 1160F PR REVIEW ALL MEDS BY PRESCRIBER/CLIN PHARMACIST DOCUMENTED: ICD-10-PCS | Mod: CPTII,S$GLB,, | Performed by: INTERNAL MEDICINE

## 2023-06-30 PROCEDURE — 77067 SCR MAMMO BI INCL CAD: CPT | Mod: TC

## 2023-07-01 DIAGNOSIS — M87.00 AVN (AVASCULAR NECROSIS OF BONE): ICD-10-CM

## 2023-07-01 DIAGNOSIS — I10 HYPERTENSION, UNSPECIFIED TYPE: Primary | ICD-10-CM

## 2023-07-01 NOTE — ANESTHESIA PAT ROS NOTE
07/01/2023  Ivy Salgado is a 43 y.o., female, [facility] Visit and Preop Instructions prior to Left Shoulder Arthroplasty on 7/18/23 with Dr. Babb @ [facility].       Pre-op Assessment    I have reviewed the Patient Summary Reports.     I have reviewed the Nursing Notes. I have reviewed the NPO Status.   I have reviewed the Medications.     Review of Systems  Anesthesia Hx:    02/21/2017  Portacath placement    Zofran [Ondansetron Hcl (Pf)] Zofran [Ondansetron Hcl (Pf)] Other (See Comments) High Allergy 5/8/2012  Per patient causes prolong QT     History of prior surgery of interest to airway management or planning:  Previous anesthesia: General 9/23/20 Ureteroscopy, removal of stent, Lithotripsy with general anesthesia.  Procedure performed at an [facility].      Airway issues documented on chart review include easy direct laryngoscopy     Denies Family Hx of Anesthesia complications.    Denies Personal Hx of Anesthesia complications.                    Social:  Non-Smoker, No Alcohol Use       Hematology/Oncology:                   Hematology Comments: VITAMIN D DEFICIENCY                   Oncology Comments: 07/17/2019  Excision of melanoma  Adnexal Benign mass  Herpes genitalis in women  Pyelonephritis        EENT/Dental:  chronic allergic rhinitis           Cardiovascular:  Exercise tolerance: good   Denies Pacemaker. Hypertension, well controlled       Denies Angina.    denies PVD no hyperlipidemia     Functional Capacity 4 METS                         Pulmonary:     Denies Asthma.   Denies Shortness of breath.  Denies Recent URI.  Denies Sleep Apnea. 12/2022, 2/2022 COVID+---RESOLVED               Renal/:   renal calculi  Urinary tract infection with hematuria  S/P ExLap, BSO, 5/30/19  Premature surgical menopause  History of recurrent UTI (urinary tract  infection)  Left ureteral stone  Ureteral stone  Pain due to ureteral stent  Herpes genitalis in women  Pyelonephritis   09/23/2020  Ureteroscopy (Left)   09/23/2020  Cystoscopy   09/23/2020  Laser lithotripsy   09/23/2020  Ureteroscopic removal of ureteric calculus   09/15/2020  Cystoscopy w/ ureteral stent placement (Left)    Bladder surgery              Hepatic/GI:   Denies PUD.  GERD, well controlled Denies Liver Disease.  Denies Hepatitis. S/P ileostomy    Crohn's disease of small intestine  Abdominal pain  Crohn's disease of small intestine with complication  Granulomatous colitis  Diarrhea  Acid reflux  Pelvic pain in female    Appendectomy  Ileostomy  Small intestine surgery   Total colectomy            Musculoskeletal:   Musculoskeletal General/Symptoms: joint pain, joint stiffness, limited ROM, muscle weakness, localized, neck pain, muscle pain, generalzed. Functional capacity is ambulatory without assistance. Joint pain  Osteopenia of multiple sites  Fall  Acute pain of left shoulder  Right foot pain     LEFT SHOULDER PAIN  BICEP TENDONITIS ON LEFT  Joint Disease:   AVASCULAR NECROSIS OF BONE OF LEFT SHOULDER  LEFT BICEP TENDONITIS  Bone Disorders:    Osteopenia        OB/GYN/PEDS:  Herpes genitalis in women  05/30/2019  Bilateral salpingo-oophorectomy (bso) (Bilateral)   05/30/2019  Lysis of adhesions (N/A)   02/21/2017  Portacath placement  04/16/2015  Oophorectomy (Right)  04/16/2015  Total abdominal hysterectomy  06/06/2012  Tubal ligation  Conization of the cervix or cold knife cone (CKC) is a surgical procedure used to diagnose and treat cervical dysplasia or very early cervical cancer                 Neurological:       Denies Seizures.    Transient neurological symptoms  2/2023 Post concussion syndrome   Pain Syndrome   Chronic Pain Syndrome    Denies Seizure Disorder                          Endocrine:  Endocrine Normal    VITAMIN D DEFICIENCY  Fatigue  Appetite impaired  Alkaline phosphatase  elevation- based on lab from 4/9/2019         Dermatological:  6/29/2022   BREAST AUGMENTATION    Ileostomy   Psych:  Psychiatric History anxiety depression Generalized anxiety disorder  Current mild episode of major depressive disorder without prior episode    Chronic, continuous use of opioids( last opioid used in 2015)     Marinol Pain Managemet             Physical Exam  General: Well nourished, Cooperative, Alert and Oriented    Airway:  Mouth Opening: Normal  Tongue: Normal  Neck ROM: Normal ROM    Dental:  Intact  BOTTOM RIGHT 2ND TOOTH CRACKED  Chest/Lungs:  Clear to auscultation, Normal Respiratory Rate    Heart:  Rate: Normal  Rhythm: Regular Rhythm  Sounds: Normal        Anesthesia Assessment: Preoperative EQUATION    Planned Procedure: Procedure(s) (LRB):  ARTHROPLASTY, SHOULDER (Left)  FIXATION, TENDON (Left)  Requested Anesthesia Type:General  Surgeon: Sharon Babb MD  Service: Orthopedics  Known or anticipated Date of Surgery:7/18/2023    Surgeon notes: reviewed; Shoulder pain, chronic, left  AVN humeral head left, Scapular dyskinesia    Electronic QUestionnaire Assessment completed via nurse interview with patient.        Triage considerations:     The patient has no apparent active cardiac condition (No unstable coronary Syndrome such as severe unstable angina or recent [<1 month] myocardial infarction, decompensated CHF, severe valvular   disease or significant arrhythmia)    Previous anesthesia records:GETA and MAC  9/23/20 Ureteroscopy, removal of stent, Lithotripsy  Intubation:     Induction:  Intravenous    Intubated:  Postinduction    Mask Ventilation:  Easy mask    Attempts:  1    Attempted By:  CRNA    Method of Intubation:  Direct    Blade:  Sarmiento 2    Laryngeal View Grade: Grade I - full view of chords      Difficult Airway Encountered?: No      Complications:  None    Airway Device:  Oral endotracheal tube    Airway Device Size:  7.0    Style/Cuff Inflation:  Cuffed (inflated to minimal  occlusive pressure)    Inflation Amount (mL):  5    Tube secured:  22    Secured at:  The lips    Placement Verified By:  Capnometry    Complicating Factors:  None    Findings Post-Intubation:  BS equal bilateral and atraumatic/condition of teeth unchanged     Last PCP note: within 1 month , within [facility]   Subspecialty notes: Cardiology: EP, Gastroenterology, Urology    Other important co-morbidities: GERD and HTN, anxiety, depression, Crohn's s/p ileostomy, recurrent UTI, multiple allergies including Zofran, Vancomycin, Morphine, Cipro, Bactrim     Tests already available:  Available tests,  within 1 month , within [facility] . Results have been reviewed. 6/2023 EKG            Instructions given. (See in Nurse's note)    Optimization:  Anesthesia Preop Clinic Assessment  Indicated    Medical Opinion Indicated       Sub-specialist consult indicated:   Cardiology, Dr. Peter Monsalve 5/18/2023 We can absolutely clear her for knee surgery from a cardiac and EP standpoint. The one caveat is anesthesia should avoid QT prolonging anesthetic agents      Plan:    Testing:  BMP and Hematology Profile   Pre-anesthesia  visit       Visit focus: possible regional anesthesia and/or nerve block        MEDICAL CLEARANCE  Consultation:Patient's PCP for a statement of optimization  6/30/2023 Dr. Hernandez,   Pt is medically optimized to proceed with planned procedure; RCRI is 3.9% with 0 risk factors; chronic diseases are stable and well compensated; she has already d/w cardiology who recommended continuing nadolol uninterrupted and informing anesthesia of her h/o prolonged QT and to avoid any offending meds otherwise     Patient  has previously scheduled Medical Appointment:  Electronically signed by Kalia Astorga MD at 6/30/2023  7:45 AM  Navigation: Tests Scheduled.              Consults scheduled.             Results will be tracked by Preop Clinic.

## 2023-07-03 ENCOUNTER — TELEPHONE (OUTPATIENT)
Dept: PREADMISSION TESTING | Facility: HOSPITAL | Age: 43
End: 2023-07-03
Payer: COMMERCIAL

## 2023-07-07 ENCOUNTER — OFFICE VISIT (OUTPATIENT)
Dept: SPORTS MEDICINE | Facility: CLINIC | Age: 43
End: 2023-07-07
Payer: COMMERCIAL

## 2023-07-07 VITALS
HEIGHT: 61 IN | WEIGHT: 107.06 LBS | HEART RATE: 70 BPM | DIASTOLIC BLOOD PRESSURE: 78 MMHG | BODY MASS INDEX: 20.21 KG/M2 | SYSTOLIC BLOOD PRESSURE: 114 MMHG

## 2023-07-07 DIAGNOSIS — G89.29 CHRONIC LEFT SHOULDER PAIN: ICD-10-CM

## 2023-07-07 DIAGNOSIS — M87.112 AVASCULAR NECROSIS OF LEFT SHOULDER DUE TO ADVERSE EFFECT OF STEROID THERAPY: ICD-10-CM

## 2023-07-07 DIAGNOSIS — M87.019 AVASCULAR NECROSIS OF BONE OF SHOULDER: Primary | ICD-10-CM

## 2023-07-07 DIAGNOSIS — M25.512 CHRONIC LEFT SHOULDER PAIN: ICD-10-CM

## 2023-07-07 DIAGNOSIS — T38.0X5A AVASCULAR NECROSIS OF LEFT SHOULDER DUE TO ADVERSE EFFECT OF STEROID THERAPY: ICD-10-CM

## 2023-07-07 PROCEDURE — 99499 NO LOS: ICD-10-PCS | Mod: S$GLB,,, | Performed by: PHYSICIAN ASSISTANT

## 2023-07-07 PROCEDURE — 99499 UNLISTED E&M SERVICE: CPT | Mod: S$GLB,,, | Performed by: PHYSICIAN ASSISTANT

## 2023-07-07 PROCEDURE — 99999 PR PBB SHADOW E&M-EST. PATIENT-LVL V: ICD-10-PCS | Mod: PBBFAC,,, | Performed by: PHYSICIAN ASSISTANT

## 2023-07-07 PROCEDURE — 99999 PR PBB SHADOW E&M-EST. PATIENT-LVL V: CPT | Mod: PBBFAC,,, | Performed by: PHYSICIAN ASSISTANT

## 2023-07-07 NOTE — DISCHARGE INSTRUCTIONS
Your surgery has been scheduled for:________7/18/23__________________________________    You should report to:  ____[facility], located on the [place] side of the first floor of the           [facility] ([phone])  ____[facility], located on the [facility] side of the            Second floor of the [facility] ([phone])  ____3rd Floor [facility] located on the [facility] side of the [facility] [phone]  _x___[facility]: located at [address] [place] [id number]. [address]     Please Note   Tell your doctor if you take Aspirin, products containing Aspirin, herbal medications  or blood thinners, such as Coumadin, Ticlid, or Plavix.  (Consult your provider regarding holding or stopping before surgery).  Arrange for someone to drive you home following surgery.  You will not be allowed to leave the surgical facility alone or drive yourself home following sedation and anesthesia.    Before Surgery  Stop taking all herbal medications, vitamins, and supplements 7 days prior to surgery  No Motrin/Advil (Ibuprofen) 7 days before surgery  No Aleve (Naproxen) 7 days before surgery  Stop Taking Asprin, products containing Asprin __7___days before surgery  Stop taking blood thinners_______days before surgery  No Goody's/BC  Powder 7 days before surgery  Refrain from drinking alcoholic beverages for 24hours before and after surgery  Stop or limit smoking ____7_____days before surgery  You may take Tylenol for pain    Night before Surgery  Do not eat or drink after midnight  Take a shower or bath (shower is recommended).  Bathe with Hibiclens soap or an antibacterial soap from the neck down.  If not supplied by your surgeon, hibiclens soap will need to be purchased over the counter in pharmacy.  Rinse soap off thoroughly.  Shampoo your hair with your regular shampoo    The Day of  Surgery  Take another bath or shower with hibiclens or any antibacterial soap, to reduce the chance of infection.  Take heart and blood pressure medications with a small sip of water, as advised by the perioperative team.  Do not take fluid pills  You may brush your teeth and rinse your mouth, but do not swall any additional water.   Do not apply perfumes, powder, body lotions or deodorant on the day of surgery.  Nail polish should be removed.  Do not wear makeup or moisturizer  Wear comfortable clothes, such as a button front shirt and loose fitting pants.  Leave all jewelry, including body piercings, and valuables at home.    Bring any devices you will neeed after surgery such as crutches or canes.  If you have sleep apnea, please bring your CPAP machine  In the event that your physical condition changes including the onset of a cold or respiratory illness, or if you have to delay or cancel your surgery, please notify your surgeon.     Anesthesia: General Anesthesia     You are watched continuously during your procedure by your anesthesia provider.     Youre due to have surgery. During surgery, youll be given medicine called anesthesia or anesthetic. This will keep you comfortable and pain-free. Your anesthesia provider will use general anesthesia.  What is general anesthesia?  General anesthesia puts you into a state like deep sleep. It goes into the bloodstream (IV anesthetics), into the lungs (gas anesthetics), or both. You feel nothing during the procedure. You will not remember it. During the procedure, the anesthesia provider monitors you continuously. He or she checks your heart rate and rhythm, blood pressure, breathing, and blood oxygen.  IV anesthetics. IV anesthetics are given through an IV line in your arm. Theyre often given first. This is so you are asleep before a gas anesthetic is started. Some kinds of IV anesthetics relieve pain. Others relax you. Your doctor will decide which kind is best in  your case.  Gas anesthetics. Gas anesthetics are breathed into the lungs. They are often used to keep you asleep. They can be given through a facemask or a tube placed in your larynx or trachea (breathing tube).  If you have a facemask, your anesthesia provider will most likely place it over your nose and mouth while youre still awake. Youll breathe oxygen through the mask as your IV anesthetic is started. Gas anesthetic may be added through the mask.  If you have a tube in the larynx or trachea, it will be inserted into your throat after youre asleep.  Anesthesia tools and medicines  You will likely have:  IV anesthetics. These are put into an IV line into your bloodstream.  Gas anesthetics. You breathe these anesthetics into your lungs, where they pass into your bloodstream.  Pulse oximeter. This is a small clip that is attached to the end of your finger. This measures your blood oxygen level.  Electrocardiography leads (electrodes). These are small sticky pads that are placed on your chest. They record your heart rate and rhythm.  Blood pressure cuff. This reads your blood pressure.  Risks and possible complications  General anesthesia has some risks. These include:  Breathing problems  Nausea and vomiting  Sore throat or hoarseness (usually temporary)  Allergic reaction to the anesthetic  Irregular heartbeat (rare)  Cardiac arrest (rare)   Anesthesia safety  Follow all instructions you are given for how long not to eat or drink before your procedure.  Be sure your doctor knows what medicines and drugs you take. This includes over-the-counter medicines, herbs, supplements, alcohol or other drugs. You will be asked when those were last taken.  Have an adult family member or friend drive you home after the procedure.  For the first 24 hours after your surgery:  Do not drive or use heavy equipment.  Do not make important decisions or sign legal documents. If important decisions or signing legal documents is  necessary during the first 24 hours after surgery, have a trusted family member or spouse act on your behalf.  Avoid alcohol.  Have a responsible adult stay with you. He or she can watch for problems and help keep you safe.  Date Last Reviewed: 12/1/2016 © 2000-2017 [employer]. [address] [place] [address], [place] [address]. All rights reserved. This information is not intended as a substitute for professional medical care. Always follow your healthcare professional's instructions.     Patient/Caregiver requests family/friend to interpret.

## 2023-07-10 ENCOUNTER — OFFICE VISIT (OUTPATIENT)
Dept: GASTROENTEROLOGY | Facility: CLINIC | Age: 43
End: 2023-07-10
Payer: COMMERCIAL

## 2023-07-10 ENCOUNTER — PATIENT MESSAGE (OUTPATIENT)
Dept: GASTROENTEROLOGY | Facility: CLINIC | Age: 43
End: 2023-07-10

## 2023-07-10 ENCOUNTER — HOSPITAL ENCOUNTER (OUTPATIENT)
Dept: PREADMISSION TESTING | Facility: HOSPITAL | Age: 43
Discharge: HOME | End: 2023-07-10
Attending: ORTHOPAEDIC SURGERY | Admitting: ORTHOPAEDIC SURGERY
Payer: COMMERCIAL

## 2023-07-10 ENCOUNTER — PATIENT MESSAGE (OUTPATIENT)
Dept: OBSTETRICS AND GYNECOLOGY | Facility: CLINIC | Age: 43
End: 2023-07-10
Payer: COMMERCIAL

## 2023-07-10 VITALS
WEIGHT: 106.63 LBS | BODY MASS INDEX: 20.13 KG/M2 | DIASTOLIC BLOOD PRESSURE: 87 MMHG | HEIGHT: 61 IN | SYSTOLIC BLOOD PRESSURE: 123 MMHG | HEART RATE: 61 BPM

## 2023-07-10 VITALS
DIASTOLIC BLOOD PRESSURE: 76 MMHG | TEMPERATURE: 98 F | OXYGEN SATURATION: 98 % | SYSTOLIC BLOOD PRESSURE: 141 MMHG | WEIGHT: 107.63 LBS | HEIGHT: 61 IN | HEART RATE: 66 BPM | BODY MASS INDEX: 20.32 KG/M2

## 2023-07-10 DIAGNOSIS — K50.019 CROHN'S DISEASE OF SMALL INTESTINE WITH COMPLICATION: Primary | ICD-10-CM

## 2023-07-10 DIAGNOSIS — K21.9 GASTROESOPHAGEAL REFLUX DISEASE, UNSPECIFIED WHETHER ESOPHAGITIS PRESENT: ICD-10-CM

## 2023-07-10 PROBLEM — K50.10 GRANULOMATOUS COLITIS: Status: RESOLVED | Noted: 2017-11-20 | Resolved: 2023-07-10

## 2023-07-10 PROCEDURE — 1160F RVW MEDS BY RX/DR IN RCRD: CPT | Mod: CPTII,S$GLB,, | Performed by: INTERNAL MEDICINE

## 2023-07-10 PROCEDURE — 99999 PR PBB SHADOW E&M-EST. PATIENT-LVL V: ICD-10-PCS | Mod: PBBFAC,,, | Performed by: INTERNAL MEDICINE

## 2023-07-10 PROCEDURE — 3074F SYST BP LT 130 MM HG: CPT | Mod: CPTII,S$GLB,, | Performed by: INTERNAL MEDICINE

## 2023-07-10 PROCEDURE — 3074F PR MOST RECENT SYSTOLIC BLOOD PRESSURE < 130 MM HG: ICD-10-PCS | Mod: CPTII,S$GLB,, | Performed by: INTERNAL MEDICINE

## 2023-07-10 PROCEDURE — 99214 PR OFFICE/OUTPT VISIT, EST, LEVL IV, 30-39 MIN: ICD-10-PCS | Mod: S$GLB,,, | Performed by: INTERNAL MEDICINE

## 2023-07-10 PROCEDURE — 1159F MED LIST DOCD IN RCRD: CPT | Mod: CPTII,S$GLB,, | Performed by: INTERNAL MEDICINE

## 2023-07-10 PROCEDURE — 1159F PR MEDICATION LIST DOCUMENTED IN MEDICAL RECORD: ICD-10-PCS | Mod: CPTII,S$GLB,, | Performed by: INTERNAL MEDICINE

## 2023-07-10 PROCEDURE — 3008F PR BODY MASS INDEX (BMI) DOCUMENTED: ICD-10-PCS | Mod: CPTII,S$GLB,, | Performed by: INTERNAL MEDICINE

## 2023-07-10 PROCEDURE — 3008F BODY MASS INDEX DOCD: CPT | Mod: CPTII,S$GLB,, | Performed by: INTERNAL MEDICINE

## 2023-07-10 PROCEDURE — 3079F DIAST BP 80-89 MM HG: CPT | Mod: CPTII,S$GLB,, | Performed by: INTERNAL MEDICINE

## 2023-07-10 PROCEDURE — 99214 OFFICE O/P EST MOD 30 MIN: CPT | Mod: S$GLB,,, | Performed by: INTERNAL MEDICINE

## 2023-07-10 PROCEDURE — 99999 PR PBB SHADOW E&M-EST. PATIENT-LVL V: CPT | Mod: PBBFAC,,, | Performed by: INTERNAL MEDICINE

## 2023-07-10 PROCEDURE — 3079F PR MOST RECENT DIASTOLIC BLOOD PRESSURE 80-89 MM HG: ICD-10-PCS | Mod: CPTII,S$GLB,, | Performed by: INTERNAL MEDICINE

## 2023-07-10 PROCEDURE — 1160F PR REVIEW ALL MEDS BY PRESCRIBER/CLIN PHARMACIST DOCUMENTED: ICD-10-PCS | Mod: CPTII,S$GLB,, | Performed by: INTERNAL MEDICINE

## 2023-07-10 RX ORDER — ZOLPIDEM TARTRATE 10 MG/1
1 TABLET ORAL DAILY
COMMUNITY
End: 2023-07-10 | Stop reason: SDUPTHER

## 2023-07-10 RX ORDER — NADOLOL 40 MG/1
1 TABLET ORAL DAILY
COMMUNITY
End: 2023-07-10 | Stop reason: SDUPTHER

## 2023-07-10 RX ORDER — ACYCLOVIR 50 MG/G
OINTMENT TOPICAL
Qty: 30 G | Refills: 1 | Status: ON HOLD | OUTPATIENT
Start: 2023-07-10 | End: 2023-07-18 | Stop reason: HOSPADM

## 2023-07-10 RX ORDER — DRONABINOL 10 MG/1
CAPSULE ORAL
COMMUNITY
End: 2023-07-10 | Stop reason: SDUPTHER

## 2023-07-10 RX ORDER — METRONIDAZOLE 250 MG/1
TABLET ORAL
Qty: 21 TABLET | Refills: 0 | Status: SHIPPED | OUTPATIENT
Start: 2023-07-10 | End: 2023-08-30

## 2023-07-10 RX ORDER — VALACYCLOVIR HYDROCHLORIDE 500 MG/1
500 TABLET, FILM COATED ORAL DAILY
Qty: 90 TABLET | Refills: 2 | Status: SHIPPED | OUTPATIENT
Start: 2023-07-10 | End: 2023-07-15

## 2023-07-10 NOTE — PROGRESS NOTES
Subjective:       Patient ID: Ivy Salgado is a 43 y.o. female.    Chief Complaint: Crohn's Disease    HPI    43-year-old lady.  History of Crohn's disease since the age of 10.  Multiple surgeries as noted.  Multiple complications per my last note.  She had total proctocolectomy with end ileostomy in 2012.  Multiple medications many of them with side effects.  The most recent biologic therapy was Entyvio from about 2019 to 2021.  She is been off that now.  So she has not been on any specific therapy for Crohn's disease in probably over 2 years.    Right now she feels good.  Her main complaint has been poor appetite.  She takes Marinol and I prescribe that on a as needed basis.  And this definitely helps.  She takes it in the evening or on days that she is not working.  Uses it p.r.n..  No abdominal pain.  No postprandial discomfort.  No change an ileostomy output.  No sensation of obstruction or blockage.  No severe distention.  She is able to stay hydrated well.  Maintaining her weight.    She does have upcoming shoulder surgery which would be a long physical therapy process afterwards to recover.  Has been under significant stress lately.  Her daughter is being treated for Crohn's disease but responding to therapy.      Past Medical History:   Diagnosis Date    Abnormal Pap smear 2007    Abnormal Pap smear 5/26/2011    Anemia     Anxiety     Arthritis     C. difficile diarrhea     Crohn's disease     Depression 8/5/2017    Encounter for blood transfusion     Genital HSV     History of colposcopy with cervical biopsy 2007 and 7/2011 2007-LYLA I  and 7/2011- LYLA I    Hypertension     Kidney stone     Kidney stone     Melanoma     Recurrent UTI 4/3/2013    S/P ileostomy 7/9/2012    Sterilization 6/23/2012       Review of patient's allergies indicates:   Allergen Reactions    Azathioprine sodium Other (See Comments)     Other reaction(s): pancreatitis  Other reaction(s): pancreatitis    Methotrexate analogues  Other (See Comments)     leukopenia    Stelara [ustekinumab] Other (See Comments)     Multiple infections    Zofran [ondansetron hcl (pf)] Other (See Comments)     Per patient causes prolong QT    Vancomycin analogues Hives    Azathioprine      Other reaction(s): Unknown    Methotrexate      Other reaction(s): infection-    Morphine Itching and Other (See Comments)     Other reaction(s): Itching    Zofran [ondansetron hcl]      Other reaction(s): Hives    Bactrim [sulfamethoxazole-trimethoprim] Palpitations    Ciprofloxacin Palpitations        Family History   Problem Relation Age of Onset    Hypertension Mother     Colon cancer Father     Cancer Father         Colon    Liver cancer Father     Hyperlipidemia Father     Endometrial cancer Maternal Aunt     Cancer Maternal Grandfather         Skin    Skin cancer Maternal Grandfather     Diabetes Maternal Grandfather     Heart disease Maternal Grandfather     Hearing loss Paternal Grandmother     Diabetes Paternal Grandfather     Crohn's disease Brother     Breast cancer Maternal Cousin 41    Ovarian cancer Neg Hx     Melanoma Neg Hx        Social History     Tobacco Use    Smoking status: Never    Smokeless tobacco: Never   Substance Use Topics    Alcohol use: Not Currently     Alcohol/week: 0.0 standard drinks    Drug use: No        Review of Systems        No results found for this or any previous visit from the past 365 days.             Objective:      Physical Exam    Assessment & Plan:       Crohn's disease of small intestine with complication  -     Comprehensive Metabolic Panel; Future; Expected date: 07/10/2023  -     C-Reactive Protein; Future; Expected date: 07/10/2023    Gastroesophageal reflux disease, unspecified whether esophagitis present     Assessment.  Crohn's disease of the small bowel status post colon resection with end ileostomy.  She still has a port in place which is available for IV fluids.  She does have periodic IV fluids if she gets  dehydrated or is concerned about that.  I I am responsible for the therapy plan for the administration of the IV fluids.  For this appointment today I was able to review her last studies which were blood work in January of this year which included normal inflammatory markers CBC and CMP.  I will order follow-up labs today.  Even though she is on no specific treatment for Crohn's disease she appears to be at least clinically and symptomatically in remission.  Given that she has upcoming shoulder surgery and needs that for the pain and inability to fully function with her shoulder, I would like to put any evaluation of her Crohn's disease on hold.  She knows to contact me right away if there is any change in symptoms.  I will see her back in 6 months and review her symptoms and decide then whether not to proceed with ileoscopy and or MR enterography.  Pre I will continue the prescription management of her Marinol for her poor appetite and her from Protonix for her reflux symptoms the reflux symptoms seem well controlled.    This note was created with voice recognition dictation technology.  There may be errors that I did not see, detect or correct.      Parish Ross MD    Discharged

## 2023-07-10 NOTE — PROGRESS NOTES
"GENERAL GI PATIENT INTAKE:    COVID symptoms in the last 7 days (runny nose, sore throat, congestion, cough, fever): No  PCP: Kalia Astorga  If not PCP-  number given to establish [phone]: N/A    ALLERGIES REVIEWED:  Yes    CHIEF COMPLAINT:    Chief Complaint   Patient presents with    Crohn's Disease       VITAL SIGNS:  /87   Pulse 61   Ht 5' 1" (1.549 m)   Wt 48.4 kg (106 lb 9.6 oz)   LMP 03/30/2015   BMI 20.14 kg/m²      Change in medical, surgical, family or social history: No      REVIEWED MEDICATION LIST RECONCILED INCLUDING ABOVE MEDS:  Yes     "

## 2023-07-12 ENCOUNTER — PATIENT MESSAGE (OUTPATIENT)
Dept: DERMATOLOGY | Facility: CLINIC | Age: 43
End: 2023-07-12
Payer: COMMERCIAL

## 2023-07-13 DIAGNOSIS — F41.9 ANXIETY: ICD-10-CM

## 2023-07-13 NOTE — TELEPHONE ENCOUNTER
Care Due:                  Date            Visit Type   Department     Provider  --------------------------------------------------------------------------------                                MYCHART                              FOLLOWUP/OF  [facility] INTERNAL  Last Visit: 06-      FICE VISIT   MEDICINE       Kalia Astorga                              NP -                              PRIMARY      [facility] INTERNAL  Next Visit: 12-      CARE ([facility])   MEDICINE       Luis Madden                                                            Last  Test          Frequency    Reason                     Performed    Due Date  --------------------------------------------------------------------------------    Lipid Panel.  12 months..  mirtazapine..............  03- 03-    [employer] Embedded Care Due Messages. Reference number: [id number].   7/13/2023 10:35:22 AM CDT

## 2023-07-14 NOTE — TELEPHONE ENCOUNTER
Refill Encounter    PCP Visits: Recent Visits  Date Type Provider Dept   06/30/23 Office Visit Kalia Astorga MD [facility] Internal Medicine   Showing recent visits within past 360 days and meeting all other requirements  Future Appointments  No visits were found meeting these conditions.  Showing future appointments within next 720 days and meeting all other requirements     Last 3 Blood Pressure:   BP Readings from Last 3 Encounters:   07/10/23 (!) 141/76   07/10/23 123/87   07/07/23 114/78     Preferred Pharmacy:   [id number] - [place] - [address] AT [facility] [address]  [address] [place] [address]  Phone: [phone] Fax: [phone]    [facility] [address]  [address] [facility] [address]  Phone: [phone] Fax: [phone]    [facility] [place]  [address] [address]  Phone: [phone] Fax: [phone]    [facility]  [place] - [address]  [address] [place] [address]  Phone: [phone] Fax: [phone]    [facility]  [address]  [place] [address]  Phone: [phone] Fax: [phone]    [id number] - [place] - [address]  [place] [address]  Phone: [phone] Fax: [phone]    Requested RX:  Requested Prescriptions     Pending Prescriptions Disp Refills    zolpidem (AMBIEN) 10 mg Tab 30 tablet 0     Sig: Take 1 tablet (10 mg total) by mouth every evening.      RX Route: Normal

## 2023-07-16 RX ORDER — ZOLPIDEM TARTRATE 10 MG/1
10 TABLET ORAL NIGHTLY
Qty: 30 TABLET | Refills: 0 | Status: SHIPPED | OUTPATIENT
Start: 2023-07-16 | End: 2023-08-12 | Stop reason: SDUPTHER

## 2023-07-17 ENCOUNTER — ANESTHESIA EVENT (OUTPATIENT)
Dept: SURGERY | Facility: HOSPITAL | Age: 43
End: 2023-07-17
Payer: COMMERCIAL

## 2023-07-17 ENCOUNTER — TELEPHONE (OUTPATIENT)
Dept: SPORTS MEDICINE | Facility: CLINIC | Age: 43
End: 2023-07-17
Payer: COMMERCIAL

## 2023-07-17 RX ORDER — SODIUM CHLORIDE 9 MG/ML
INJECTION, SOLUTION INTRAVENOUS CONTINUOUS
Status: CANCELLED | OUTPATIENT
Start: 2023-07-17

## 2023-07-17 RX ORDER — PROMETHAZINE HYDROCHLORIDE 25 MG/1
25 TABLET ORAL EVERY 6 HOURS PRN
Qty: 8 TABLET | Refills: 0 | Status: SHIPPED | OUTPATIENT
Start: 2023-07-17 | End: 2023-08-30

## 2023-07-17 RX ORDER — ASPIRIN 81 MG/1
81 TABLET ORAL DAILY
Qty: 42 TABLET | Refills: 0 | Status: SHIPPED | OUTPATIENT
Start: 2023-07-17 | End: 2023-09-08 | Stop reason: SDUPTHER

## 2023-07-17 RX ORDER — CLINDAMYCIN HYDROCHLORIDE 150 MG/1
450 CAPSULE ORAL
Status: CANCELLED | OUTPATIENT
Start: 2023-07-17 | End: 2023-07-18

## 2023-07-17 RX ORDER — OXYCODONE AND ACETAMINOPHEN 10; 325 MG/1; MG/1
TABLET ORAL
Qty: 21 TABLET | Refills: 0 | Status: SHIPPED | OUTPATIENT
Start: 2023-07-17 | End: 2023-07-17 | Stop reason: CLARIF

## 2023-07-17 RX ORDER — PREGABALIN 75 MG/1
75 CAPSULE ORAL
Status: CANCELLED | OUTPATIENT
Start: 2023-07-17 | End: 2023-07-17

## 2023-07-17 RX ORDER — NALOXONE HYDROCHLORIDE 4 MG/.1ML
SPRAY NASAL
Qty: 2 EACH | Refills: 11 | Status: SHIPPED | OUTPATIENT
Start: 2023-07-17 | End: 2023-07-17 | Stop reason: CLARIF

## 2023-07-17 RX ORDER — OXYCODONE HYDROCHLORIDE 10 MG/1
10 TABLET ORAL
Qty: 20 TABLET | Refills: 0 | Status: SHIPPED | OUTPATIENT
Start: 2023-07-17 | End: 2023-07-21 | Stop reason: SDUPTHER

## 2023-07-17 RX ORDER — CLINDAMYCIN PHOSPHATE 900 MG/50ML
900 INJECTION, SOLUTION INTRAVENOUS
Status: CANCELLED | OUTPATIENT
Start: 2023-07-18 | End: 2023-07-18

## 2023-07-17 RX ORDER — TRAMADOL HYDROCHLORIDE 50 MG/1
50-100 TABLET ORAL EVERY 6 HOURS PRN
Qty: 21 TABLET | Refills: 0 | Status: SHIPPED | OUTPATIENT
Start: 2023-07-17 | End: 2023-08-30

## 2023-07-17 RX ORDER — OXYCODONE HCL 10 MG/1
10 TABLET, FILM COATED, EXTENDED RELEASE ORAL
Status: CANCELLED | OUTPATIENT
Start: 2023-07-17 | End: 2023-07-17

## 2023-07-17 NOTE — H&P
Ivy Salgado  is here for a completion of her perioperative paperwork. she  Is scheduled to undergo      Left              a. Shoulder arthroplasty humeral head with DJO CS Edge              b. Shoulder arthroscopic biceps tenodesison 7/18/23.      She is a healthy individual and does need clearance for this procedure which she has received from PCP and cardiology.     PAST MEDICAL HISTORY:   Past Medical History:   Diagnosis Date    Abnormal Pap smear 2007    Abnormal Pap smear 5/26/2011    Anemia     Anxiety     Arthritis     C. difficile diarrhea     Crohn's disease     Depression 8/5/2017    Encounter for blood transfusion     Genital HSV     History of colposcopy with cervical biopsy 2007 and 7/2011 2007-LYLA I  and 7/2011- LYLA I    Hypertension     Kidney stone     Kidney stone     Melanoma     Recurrent UTI 4/3/2013    S/P ileostomy 7/9/2012    Sterilization 6/23/2012     PAST SURGICAL HISTORY:   Past Surgical History:   Procedure Laterality Date    ABDOMINAL SURGERY      APPENDECTOMY      AUGMENTATION OF BREAST Bilateral 06/2022    gel implants    BILATERAL SALPINGO-OOPHORECTOMY (BSO) Bilateral 05/30/2019    Procedure: SALPINGO-OOPHORECTOMY, BILATERAL;  Surgeon: Rupa German MD;  Location: [address];  Service: OB/GYN;  Laterality: Bilateral;    BLADDER SURGERY      partial cystectomy due to fistula    breast lift      BREAST SURGERY      [facility]      COLON SURGERY      COLONOSCOPY      CYSTOSCOPY  09/23/2020    Procedure: CYSTOSCOPY;  Surgeon: Sascha Florentino MD;  Location: [address];  Service: Urology;;    CYSTOSCOPY W/ URETERAL STENT PLACEMENT Left 09/15/2020    Procedure: CYSTOSCOPY, WITH URETERAL STENT INSERTION;  Surgeon: Sascha Florentino MD;  Location: [facility] OR [address] [place] [address];  Service: Urology;  Laterality: Left;    DIAGNOSTIC LAPAROSCOPY N/A 07/09/2020    Procedure: LAPAROSCOPY, DIAGNOSTIC;  Surgeon: Gilson El MD;  Location: [facility] OR;  Service: OB/GYN;  Laterality: N/A;     EXCISION OF MELANOMA  07/17/2019    ILEOSTOMY      LAPAROSCOPIC LYSIS OF ADHESIONS N/A 07/09/2020    Procedure: LYSIS, ADHESIONS, LAPAROSCOPIC;  Surgeon: Gilson El MD;  Location: [facility] OR;  Service: OB/GYN;  Laterality: N/A;    LASER LITHOTRIPSY  09/23/2020    Procedure: LITHOTRIPSY, USING LASER;  Surgeon: Sascha Florentino MD;  Location: [facility] OR 1ST FLR;  Service: Urology;;    LYSIS OF ADHESIONS N/A 05/30/2019    Procedure: LYSIS, ADHESIONS;  Surgeon: Rupa German MD;  Location: [facility] OR 2ND FLR;  Service: OB/GYN;  Laterality: N/A;    OOPHORECTOMY Right 04/16/2015    PORTACATH PLACEMENT  02/21/2017    SKIN BIOPSY      SMALL INTESTINE SURGERY      age 18 Y    TOTAL ABDOMINAL HYSTERECTOMY  04/16/2015    TOTAL COLECTOMY      TUBAL LIGATION  06/06/2012    UPPER GASTROINTESTINAL ENDOSCOPY      URETEROSCOPIC REMOVAL OF URETERIC CALCULUS  09/23/2020    Procedure: REMOVAL, CALCULUS, URETER, URETEROSCOPIC;  Surgeon: Sascha Florentino MD;  [facility];  Service: Urology;;    URETEROSCOPY Left 09/23/2020    Procedure: URETEROSCOPY;  Surgeon: Sascha Florentino MD;  [facility];  Service: Urology;  Laterality: Left;     FAMILY HISTORY:   Family History   Problem Relation Age of Onset    Hypertension Mother     Colon cancer Father     Cancer Father         Colon    Liver cancer Father     Hyperlipidemia Father     Endometrial cancer Maternal Aunt     Cancer Maternal Grandfather         Skin    Skin cancer Maternal Grandfather     Diabetes Maternal Grandfather     Heart disease Maternal Grandfather     Hearing loss Paternal Grandmother     Diabetes Paternal Grandfather     Crohn's disease Brother     Breast cancer Maternal Cousin 41    Ovarian cancer Neg Hx     Melanoma Neg Hx      SOCIAL HISTORY:   Social History     Socioeconomic History    Marital status: Single   Occupational History     Employer: [employer]   Tobacco Use    Smoking status: Never    Smokeless  tobacco: Never   Substance and Sexual Activity    Alcohol use: Not Currently     Alcohol/week: 0.0 standard drinks    Drug use: No    Sexual activity: Yes     Partners: Male     Birth control/protection: See Surgical Hx     Comment: HYST   Other Topics Concern    Are you pregnant or think you may be? No    Breast-feeding No     Social Determinants of Health     Financial Resource Strain: Medium Risk    Difficulty of Paying Living Expenses: Somewhat hard   Food Insecurity: No Food Insecurity    Worried About Running Out of Food in the Last Year: Never true    Ran Out of Food in the Last Year: Never true   Transportation Needs: No Transportation Needs    Lack of Transportation (Medical): No    Lack of Transportation (Non-Medical): No   Physical Activity: Inactive    Days of Exercise per Week: 0 days    Minutes of Exercise per Session: 30 min   Stress: Stress Concern Present    Feeling of Stress : Very much   Social Connections: Unknown    Frequency of Communication with Friends and Family: More than three times a week    Frequency of Social Gatherings with Friends and Family: Once a week    Active Member of Clubs or Organizations: No    Attends Club or Organization Meetings: 1 to 4 times per year    Marital Status:    Housing Stability: Low Risk     Unable to Pay for Housing in the Last Year: No    Number of Places Lived in the Last Year: 2    Unstable Housing in the Last Year: No       MEDICATIONS:   Current Outpatient Medications:     boric acid (BORIC ACID) vaginal suppository, Place 1 each (650 mg total) vaginally nightly as needed. (Patient not taking: Reported on 7/10/2023), Disp: 30 suppository, Rfl: 12    clonazePAM (KLONOPIN) 1 MG tablet, TAKE 1 AND 1/2 TABLETS BY MOUTH TWICE DAILY AS NEEDED FOR ANXIETY, Disp: 75 tablet, Rfl: 0    diphenoxylate-atropine 2.5-0.025 mg (LOMOTIL) 2.5-0.025 mg per tablet, Take 1 tablet by mouth 4 (four) times daily as needed for Diarrhea. for diarrhea (Patient not taking:  Reported on 7/10/2023), Disp: 100 tablet, Rfl: 0    droNABinol (MARINOL) 5 MG capsule, Take 1 capsule (5 mg total) by mouth 2 (two) times daily before meals., Disp: 60 capsule, Rfl: 1    fluconazole (DIFLUCAN) 150 MG Tab, Take 1 tablet (150 mg total) by mouth every 72 hours as needed (vaginal yeast). (Patient not taking: Reported on 7/10/2023), Disp: 3 tablet, Rfl: 0    gabapentin (NEURONTIN) 300 MG capsule, Take 1 capsule (300 mg total) by mouth 2 (two) times daily., Disp: 60 capsule, Rfl: 11    LIDOcaine (LIDODERM) 5 %, Place 1 patch onto the skin once daily. Remove & Discard patch within 12 hours or as directed by MD, Disp: 21 patch, Rfl: 0    loperamide (IMODIUM) 2 mg capsule, Take 2 mg by mouth daily as needed for Diarrhea., Disp: , Rfl:     mirtazapine (REMERON SOL-TAB) 30 MG disintegrating tablet, Take 1 tablet (30 mg total) by mouth nightly., Disp: 90 tablet, Rfl: 2    multivitamin (THERAGRAN) per tablet, Take 1 tablet by mouth once daily., Disp: , Rfl:     nadoloL (CORGARD) 40 MG tablet, Take 1 tablet (40 mg total) by mouth once daily., Disp: 30 tablet, Rfl: 11    pantoprazole (PROTONIX) 40 MG tablet, Take 1 tablet (40 mg total) by mouth 2 (two) times daily., Disp: 60 tablet, Rfl: 12    promethazine (PHENERGAN) 25 MG tablet, TAKE 1 TABLET BY MOUTH EVERY 8 HOURS FOR NAUSEA, Disp: 30 tablet, Rfl: 1    sumatriptan (IMITREX) 50 MG tablet, TAKE 1 TABLET BY MOUTH AS NEEDED FOR MIGRAINE. MAY REPEAT ONCE IN 2 HRS. DO NOT EXCEED 2 TABS IN 24 HRS (Patient not taking: Reported on 7/10/2023), Disp: 12 tablet, Rfl: 0    valACYclovir (VALTREX) 1000 MG tablet, Take one tablet by mouth twice daily for 5 days, Disp: 10 tablet, Rfl: 3    vedolizumab (ENTYVIO) 300 mg SolR injection, Inject 300 mg into the vein., Disp: , Rfl:     acyclovir 5% (ZOVIRAX) 5 % Crea, Apply topically 5 (five) times daily. (Patient not taking: Reported on 7/10/2023), Disp: 5 g, Rfl: 1    acyclovir 5% (ZOVIRAX) 5 % ointment, Apply topically every 3  (three) hours., Disp: 30 g, Rfl: 1    aspirin (ECOTRIN) 81 MG EC tablet, Take 1 tablet (81 mg total) by mouth once daily. For 6 weeks starting the day after surgery., Disp: 42 tablet, Rfl: 0    metroNIDAZOLE (FLAGYL) 250 MG tablet, TAKE 1 TABLET(250 MG) BY MOUTH THREE TIMES DAILY FOR 7 DAYS, Disp: 21 tablet, Rfl: 0    naloxone (NARCAN) 4 mg/actuation Spry, 4mg by nasal route as needed for opioid overdose; may repeat every 2-3 minutes in alternating nostrils until medical help arrives. Call 911, Disp: 1 each, Rfl: 11    oxyCODONE-acetaminophen (PERCOCET)  mg per tablet, Take 1 tablet by mouth every 4-6 hours as needed for pain. Take stool softener with this medication., Disp: 21 tablet, Rfl: 0    promethazine (PHENERGAN) 25 MG tablet, Take 1 tablet (25 mg total) by mouth every 6 (six) hours as needed for Nausea., Disp: 8 tablet, Rfl: 0    traMADoL (ULTRAM) 50 mg tablet, Take 1-2 tablets ( mg total) by mouth every 6 (six) hours as needed for Pain., Disp: 21 tablet, Rfl: 0    zolpidem (AMBIEN) 10 mg Tab, Take 1 tablet (10 mg total) by mouth every evening., Disp: 30 tablet, Rfl: 0    Current Facility-Administered Medications:     estradiol valerate (DELESTROGEN) injection 20 mg/mL, 20 mg, Intramuscular, Q30 Days, Gilson El MD, 20 mg at 12/30/22 0902    estradiol valerate injection 20 mg, 20 mg, Intramuscular, Q30 Days, Gilson El MD, 20 mg at 05/11/23 1327  ALLERGIES:   Review of patient's allergies indicates:   Allergen Reactions    Azathioprine sodium Other (See Comments)     Other reaction(s): pancreatitis  Other reaction(s): pancreatitis    Methotrexate analogues Other (See Comments)     leukopenia    Stelara [ustekinumab] Other (See Comments)     Multiple infections    Zofran [ondansetron hcl (pf)] Other (See Comments)     Per patient causes prolong QT    Vancomycin analogues Hives    Azathioprine      Other reaction(s): Unknown    Methotrexate      Other reaction(s): infection-     "Morphine Itching and Other (See Comments)     Other reaction(s): Itching    Zofran [ondansetron hcl]      Other reaction(s): Hives    Bactrim [sulfamethoxazole-trimethoprim] Palpitations    Ciprofloxacin Palpitations       VITAL SIGNS: /78   Pulse 70   Ht 5' 1" (1.549 m)   Wt 48.5 kg (107 lb 0.5 oz)   LMP 03/30/2015   BMI 20.22 kg/m²      Risks, indications and benefits of the surgical procedure were discussed with the patient. All questions with regard to surgery, rehab, expected return to functional activities, activities of daily living and recreational endeavors were answered to her satisfaction.    It was explained to the patient that there may be an increase in surgical risks if the patient has certain co-morbidities such as but not limited to: Obesity, Cardiovascular issues (CHF, CAD, Arrhythmias), chronic pulmonary issues, previous or current neurovascular/neurological issues, previous strokes, diabetes mellitus, previous wound healing issues, previous wound or skin infections, PVD, clotting disorders, if the patient uses chronic steroids, if the patient takes or has immune compromising medications or diseases, or has previously or currently used tobacco products.     The patient verbalized that he/she does not have any additional clotting, bleeding, or blood disorders, other than what is list in her chart on today's review.     Then a brief history and physical exam were performed.    Review of Systems   Constitution: Negative. Negative for chills, fever and night sweats.   HENT: Negative for congestion and headaches.    Eyes: Negative for blurred vision, left vision loss and right vision loss.   Cardiovascular: Negative for chest pain and syncope.   Respiratory: Negative for cough and shortness of breath.    Endocrine: Negative for polydipsia, polyphagia and polyuria.   Hematologic/Lymphatic: Negative for bleeding problem. Does not bruise/bleed easily.   Skin: Negative for dry skin, itching and " rash.   Musculoskeletal: Negative for falls and muscle weakness.   Gastrointestinal: Negative for abdominal pain and bowel incontinence.   Genitourinary: Negative for bladder incontinence and nocturia.   Neurological: Negative for disturbances in coordination, loss of balance and seizures.   Psychiatric/Behavioral: Negative for depression. The patient does not have insomnia.    Allergic/Immunologic: Negative for hives and persistent infections.     PHYSICAL EXAM:  GEN: A&Ox3, WD WN NAD  HEENT: WNL  CHEST: CTAB, no W/R/R  HEART: RRR, no M/R/G  ABD: Soft, NT ND, BS x4 QUADS  MS; See Epic  NEURO: CN II-XII intact       The surgical consent was then reviewed with the patient, who agreed with all the contents of the consent form and it was signed. she was then given the surgery packet to bring with her to surgery center for the anesthesia portion of her perioperative paperwork (if needed)   For all physicians except for Dr. De La Cruz, we will email and possibly fax the consent forms and booking sheets to [facility].    The patient was given the opportunity to ask questions about the surgical plan and consent form, and once no other questions were asked, I proceeded with the pre-op appointment.    PHYSICAL THERAPY:  She was also instructed regarding physical therapy and will begin on  3 weeks post-op. She was given a copy of the original prescription to schedule. Another copy of this prescription was also faxed to [facility].    POST OP CARE:instructions were reviewed including care of the wound and dressing after surgery and when she can shower. Patient was told not sleep or lay on there surgical extremity following surgery as this could cause repair damage, tissue damage, or nerve injury.    An extensive amount of time was spent on discussion of the following information based on what type of surgery the patient was having. Patient expressed understanding of the material below:    Shoulder surgery or upper  extremity surgery requiring post-op sling:  It was explained to the patient that they should remove their arm from the sling approximately 6 times per day to do full elbow ROM (flexion and extension) and full supination and pronation of the elbow for approximately 5 minutes at a time to help prevent elbow stiffness, nerve pain or problems, or nerve injury. They were told to contact us if they begin having numbness and tingling of there surgical extremity that persists longer then 1 day without relief.     Extremity surgery requiring a splint:   It was explained to patient on how to properly elevated position there extremity to prevent pressure ulcers from occurring. I made sure that the patient understood that that surgical site may be numb following surgery and prevent them from feeling pressure pain that they would normal feel if a pressure injury was occurring. Pressure ulcers and there causes were discussed with the patient today.     Post-operative splint:  It was explain to the patient that they can contact us at anytime if they feel that there is a problem with their splint or under their splint that needs evaluation. If there is concern, questions, or discomfort with the splint then they can present to either our clinic or the [facility] ED for removal, evaluation, and replacement of the splint.    CRUTCHES OR WALKER: It was explained to the patient that if they are having a lower extremity surgery that they will require either a walker or crutches to ambulate safely with after surgery. It was explained that a cane or other assistive devices are not sufficient to safely ambulate with after surgery. I explained to the patient that I will place an order for them to receive either crutches or a walker after surgery to go home with. It was explained that if they have crutches or a walker at home already, that they are REQUIRED to bring them to the hospital on the day of surgery. It was explained that if  they do not have them at the hospital on the day of surgery that they WILL be provided a new pair or crutches or a walker to go home with to ensure ambulation will be safe if the patient needs to stop somewhere on the way home.      PAIN MANAGEMENT: Ivy Salgado was also given a pain management regime, which includes the option of getting a TENS unit given to her by [facility] (if patient chooses) along with the education required for its use. She was also instructed regarding the Polar ice unit or gel ice packs (chosen by patient) that will be in place after surgery and her postoperative pain medications.     Patient understands that Polar Ice machine has to be bought today if they want it. It cannot be bought on day of surgery at surgery center.     PAIN MEDICATION:  Percocet 10/325mg 1 po q 4-6 hours prn pain  Ultram 50 mg Take 1-2 p.o. q.6 hours p.r.n. breakthrough pain,   Phenergan 25 mg one p.o. q.6 hours p.r.n. nausea and vomiting.    DVT prophylaxis was discussed with the patient today including risk factors for developing DVTs and history of DVTs. The patient was asked if any specific recommendations were given from the doctor/s that did pre-operative surgical clearance. The patient was then given an education sheet about DVTs and PE with warning signs and symptoms of both and steps to take if they suspect either of these.    This along with the Modified Caprini risk assessment model for VTE in general surgical patients was used to determine the patient's DVT risk.     From: Julian JONES, Jeremy DA, Jodee SM, et al. Prevention of VTE in nonorthopedic surgical patients: antithrombotic therapy and prevention of thrombosis, 9th ed: American College of Chest [employer]icians evidence-based clinical practical guidelines. Chest 2012; 141:e227S. Copyright © 2012. Reproduced with permission from the American College of Chest Physicians.    The below listed DVT prophylaxis regimen along with bilateral JEANNA compression  stockings will be used post-op. Length of treatment has been determined to be 10-42 days post-op by the above noted Caprini assessment model.     The patient was instructed to buy and take:  Aspirin 81mg QD x 6 weeks for DVT prophylaxis starting on the morning after surgery.  Patient will also use bilateral TEDs on lower extremities, SCDs during surgery, and early ambulation post-op. If the patient was previously taking 81mg baby aspirin, they were told to not take it starting 5 days prior to surgery and to restart the 81mg aspirin after surgery.       Patient was also told to buy over the counter Prilosec medication if needed and take it once daily for GI protection as long as they are taking NSAIDs or Aspirin.   Patient denies history of seizures.      The patient was told that narcotic pain medications may make them drowsy and instructions were given to not sign legal documents, drive or operate heavy machinery, cars, or equipment while under the influence of narcotic medications. The patient was told and understands that narcotic pain medications should only be used as needed to control pain and that other options of pain control include TENs unit and ice packs/unit.     As there were no other questions to be asked, she was given my business card along with Sharon Babb MD business card if she has any questions or concerns prior to surgery or in the postop period.     At the end of our encounter today, the patient was given the option and asked if they would like to see the surgeon regarding questions or concerns that they have about the consent form or the surgical procedure. The patient did not need to see Dr. Babb prior to surgery but was given the opportunity.

## 2023-07-17 NOTE — H&P (VIEW-ONLY)
Ivy Salgado  is here for a completion of her perioperative paperwork. she  Is scheduled to undergo      Left              a. Shoulder arthroplasty humeral head with DJO CS Edge              b. Shoulder arthroscopic biceps tenodesison 7/18/23.      She is a healthy individual and does need clearance for this procedure which she has received from PCP and cardiology.     PAST MEDICAL HISTORY:   Past Medical History:   Diagnosis Date    Abnormal Pap smear 2007    Abnormal Pap smear 5/26/2011    Anemia     Anxiety     Arthritis     C. difficile diarrhea     Crohn's disease     Depression 8/5/2017    Encounter for blood transfusion     Genital HSV     History of colposcopy with cervical biopsy 2007 and 7/2011 2007-LYLA I  and 7/2011- LYLA I    Hypertension     Kidney stone     Kidney stone     Melanoma     Recurrent UTI 4/3/2013    S/P ileostomy 7/9/2012    Sterilization 6/23/2012     PAST SURGICAL HISTORY:   Past Surgical History:   Procedure Laterality Date    ABDOMINAL SURGERY      APPENDECTOMY      AUGMENTATION OF BREAST Bilateral 06/2022    gel implants    BILATERAL SALPINGO-OOPHORECTOMY (BSO) Bilateral 05/30/2019    Procedure: SALPINGO-OOPHORECTOMY, BILATERAL;  Surgeon: Rupa German MD;  Location: [address];  Service: OB/GYN;  Laterality: Bilateral;    BLADDER SURGERY      partial cystectomy due to fistula    breast lift      BREAST SURGERY      [facility]      COLON SURGERY      COLONOSCOPY      CYSTOSCOPY  09/23/2020    Procedure: CYSTOSCOPY;  Surgeon: Sascha Florentino MD;  Location: [address];  Service: Urology;;    CYSTOSCOPY W/ URETERAL STENT PLACEMENT Left 09/15/2020    Procedure: CYSTOSCOPY, WITH URETERAL STENT INSERTION;  Surgeon: Sascha Florentino MD;  Location: [facility] OR [address] [place] [address];  Service: Urology;  Laterality: Left;    DIAGNOSTIC LAPAROSCOPY N/A 07/09/2020    Procedure: LAPAROSCOPY, DIAGNOSTIC;  Surgeon: Gilsno El MD;  Location: [facility] OR;  Service: OB/GYN;  Laterality: N/A;     EXCISION OF MELANOMA  07/17/2019    ILEOSTOMY      LAPAROSCOPIC LYSIS OF ADHESIONS N/A 07/09/2020    Procedure: LYSIS, ADHESIONS, LAPAROSCOPIC;  Surgeon: Gilson El MD;  Location: [facility] OR;  Service: OB/GYN;  Laterality: N/A;    LASER LITHOTRIPSY  09/23/2020    Procedure: LITHOTRIPSY, USING LASER;  Surgeon: Sascha Florentino MD;  Location: [facility] OR 1ST FLR;  Service: Urology;;    LYSIS OF ADHESIONS N/A 05/30/2019    Procedure: LYSIS, ADHESIONS;  Surgeon: Rupa German MD;  Location: [facility] OR 2ND FLR;  Service: OB/GYN;  Laterality: N/A;    OOPHORECTOMY Right 04/16/2015    PORTACATH PLACEMENT  02/21/2017    SKIN BIOPSY      SMALL INTESTINE SURGERY      age 18 Y    TOTAL ABDOMINAL HYSTERECTOMY  04/16/2015    TOTAL COLECTOMY      TUBAL LIGATION  06/06/2012    UPPER GASTROINTESTINAL ENDOSCOPY      URETEROSCOPIC REMOVAL OF URETERIC CALCULUS  09/23/2020    Procedure: REMOVAL, CALCULUS, URETER, URETEROSCOPIC;  Surgeon: Sascha Florentino MD;  [facility];  Service: Urology;;    URETEROSCOPY Left 09/23/2020    Procedure: URETEROSCOPY;  Surgeon: Sascha Florentino MD;  [facility];  Service: Urology;  Laterality: Left;     FAMILY HISTORY:   Family History   Problem Relation Age of Onset    Hypertension Mother     Colon cancer Father     Cancer Father         Colon    Liver cancer Father     Hyperlipidemia Father     Endometrial cancer Maternal Aunt     Cancer Maternal Grandfather         Skin    Skin cancer Maternal Grandfather     Diabetes Maternal Grandfather     Heart disease Maternal Grandfather     Hearing loss Paternal Grandmother     Diabetes Paternal Grandfather     Crohn's disease Brother     Breast cancer Maternal Cousin 41    Ovarian cancer Neg Hx     Melanoma Neg Hx      SOCIAL HISTORY:   Social History     Socioeconomic History    Marital status: Single   Occupational History     Employer: [employer]   Tobacco Use    Smoking status: Never    Smokeless  tobacco: Never   Substance and Sexual Activity    Alcohol use: Not Currently     Alcohol/week: 0.0 standard drinks    Drug use: No    Sexual activity: Yes     Partners: Male     Birth control/protection: See Surgical Hx     Comment: HYST   Other Topics Concern    Are you pregnant or think you may be? No    Breast-feeding No     Social Determinants of Health     Financial Resource Strain: Medium Risk    Difficulty of Paying Living Expenses: Somewhat hard   Food Insecurity: No Food Insecurity    Worried About Running Out of Food in the Last Year: Never true    Ran Out of Food in the Last Year: Never true   Transportation Needs: No Transportation Needs    Lack of Transportation (Medical): No    Lack of Transportation (Non-Medical): No   Physical Activity: Inactive    Days of Exercise per Week: 0 days    Minutes of Exercise per Session: 30 min   Stress: Stress Concern Present    Feeling of Stress : Very much   Social Connections: Unknown    Frequency of Communication with Friends and Family: More than three times a week    Frequency of Social Gatherings with Friends and Family: Once a week    Active Member of Clubs or Organizations: No    Attends Club or Organization Meetings: 1 to 4 times per year    Marital Status:    Housing Stability: Low Risk     Unable to Pay for Housing in the Last Year: No    Number of Places Lived in the Last Year: 2    Unstable Housing in the Last Year: No       MEDICATIONS:   Current Outpatient Medications:     boric acid (BORIC ACID) vaginal suppository, Place 1 each (650 mg total) vaginally nightly as needed. (Patient not taking: Reported on 7/10/2023), Disp: 30 suppository, Rfl: 12    clonazePAM (KLONOPIN) 1 MG tablet, TAKE 1 AND 1/2 TABLETS BY MOUTH TWICE DAILY AS NEEDED FOR ANXIETY, Disp: 75 tablet, Rfl: 0    diphenoxylate-atropine 2.5-0.025 mg (LOMOTIL) 2.5-0.025 mg per tablet, Take 1 tablet by mouth 4 (four) times daily as needed for Diarrhea. for diarrhea (Patient not taking:  Reported on 7/10/2023), Disp: 100 tablet, Rfl: 0    droNABinol (MARINOL) 5 MG capsule, Take 1 capsule (5 mg total) by mouth 2 (two) times daily before meals., Disp: 60 capsule, Rfl: 1    fluconazole (DIFLUCAN) 150 MG Tab, Take 1 tablet (150 mg total) by mouth every 72 hours as needed (vaginal yeast). (Patient not taking: Reported on 7/10/2023), Disp: 3 tablet, Rfl: 0    gabapentin (NEURONTIN) 300 MG capsule, Take 1 capsule (300 mg total) by mouth 2 (two) times daily., Disp: 60 capsule, Rfl: 11    LIDOcaine (LIDODERM) 5 %, Place 1 patch onto the skin once daily. Remove & Discard patch within 12 hours or as directed by MD, Disp: 21 patch, Rfl: 0    loperamide (IMODIUM) 2 mg capsule, Take 2 mg by mouth daily as needed for Diarrhea., Disp: , Rfl:     mirtazapine (REMERON SOL-TAB) 30 MG disintegrating tablet, Take 1 tablet (30 mg total) by mouth nightly., Disp: 90 tablet, Rfl: 2    multivitamin (THERAGRAN) per tablet, Take 1 tablet by mouth once daily., Disp: , Rfl:     nadoloL (CORGARD) 40 MG tablet, Take 1 tablet (40 mg total) by mouth once daily., Disp: 30 tablet, Rfl: 11    pantoprazole (PROTONIX) 40 MG tablet, Take 1 tablet (40 mg total) by mouth 2 (two) times daily., Disp: 60 tablet, Rfl: 12    promethazine (PHENERGAN) 25 MG tablet, TAKE 1 TABLET BY MOUTH EVERY 8 HOURS FOR NAUSEA, Disp: 30 tablet, Rfl: 1    sumatriptan (IMITREX) 50 MG tablet, TAKE 1 TABLET BY MOUTH AS NEEDED FOR MIGRAINE. MAY REPEAT ONCE IN 2 HRS. DO NOT EXCEED 2 TABS IN 24 HRS (Patient not taking: Reported on 7/10/2023), Disp: 12 tablet, Rfl: 0    valACYclovir (VALTREX) 1000 MG tablet, Take one tablet by mouth twice daily for 5 days, Disp: 10 tablet, Rfl: 3    vedolizumab (ENTYVIO) 300 mg SolR injection, Inject 300 mg into the vein., Disp: , Rfl:     acyclovir 5% (ZOVIRAX) 5 % Crea, Apply topically 5 (five) times daily. (Patient not taking: Reported on 7/10/2023), Disp: 5 g, Rfl: 1    acyclovir 5% (ZOVIRAX) 5 % ointment, Apply topically every 3  (three) hours., Disp: 30 g, Rfl: 1    aspirin (ECOTRIN) 81 MG EC tablet, Take 1 tablet (81 mg total) by mouth once daily. For 6 weeks starting the day after surgery., Disp: 42 tablet, Rfl: 0    metroNIDAZOLE (FLAGYL) 250 MG tablet, TAKE 1 TABLET(250 MG) BY MOUTH THREE TIMES DAILY FOR 7 DAYS, Disp: 21 tablet, Rfl: 0    naloxone (NARCAN) 4 mg/actuation Spry, 4mg by nasal route as needed for opioid overdose; may repeat every 2-3 minutes in alternating nostrils until medical help arrives. Call 911, Disp: 1 each, Rfl: 11    oxyCODONE-acetaminophen (PERCOCET)  mg per tablet, Take 1 tablet by mouth every 4-6 hours as needed for pain. Take stool softener with this medication., Disp: 21 tablet, Rfl: 0    promethazine (PHENERGAN) 25 MG tablet, Take 1 tablet (25 mg total) by mouth every 6 (six) hours as needed for Nausea., Disp: 8 tablet, Rfl: 0    traMADoL (ULTRAM) 50 mg tablet, Take 1-2 tablets ( mg total) by mouth every 6 (six) hours as needed for Pain., Disp: 21 tablet, Rfl: 0    zolpidem (AMBIEN) 10 mg Tab, Take 1 tablet (10 mg total) by mouth every evening., Disp: 30 tablet, Rfl: 0    Current Facility-Administered Medications:     estradiol valerate (DELESTROGEN) injection 20 mg/mL, 20 mg, Intramuscular, Q30 Days, Gilson El MD, 20 mg at 12/30/22 0902    estradiol valerate injection 20 mg, 20 mg, Intramuscular, Q30 Days, Gilson El MD, 20 mg at 05/11/23 1327  ALLERGIES:   Review of patient's allergies indicates:   Allergen Reactions    Azathioprine sodium Other (See Comments)     Other reaction(s): pancreatitis  Other reaction(s): pancreatitis    Methotrexate analogues Other (See Comments)     leukopenia    Stelara [ustekinumab] Other (See Comments)     Multiple infections    Zofran [ondansetron hcl (pf)] Other (See Comments)     Per patient causes prolong QT    Vancomycin analogues Hives    Azathioprine      Other reaction(s): Unknown    Methotrexate      Other reaction(s): infection-     "Morphine Itching and Other (See Comments)     Other reaction(s): Itching    Zofran [ondansetron hcl]      Other reaction(s): Hives    Bactrim [sulfamethoxazole-trimethoprim] Palpitations    Ciprofloxacin Palpitations       VITAL SIGNS: /78   Pulse 70   Ht 5' 1" (1.549 m)   Wt 48.5 kg (107 lb 0.5 oz)   LMP 03/30/2015   BMI 20.22 kg/m²      Risks, indications and benefits of the surgical procedure were discussed with the patient. All questions with regard to surgery, rehab, expected return to functional activities, activities of daily living and recreational endeavors were answered to her satisfaction.    It was explained to the patient that there may be an increase in surgical risks if the patient has certain co-morbidities such as but not limited to: Obesity, Cardiovascular issues (CHF, CAD, Arrhythmias), chronic pulmonary issues, previous or current neurovascular/neurological issues, previous strokes, diabetes mellitus, previous wound healing issues, previous wound or skin infections, PVD, clotting disorders, if the patient uses chronic steroids, if the patient takes or has immune compromising medications or diseases, or has previously or currently used tobacco products.     The patient verbalized that he/she does not have any additional clotting, bleeding, or blood disorders, other than what is list in her chart on today's review.     Then a brief history and physical exam were performed.    Review of Systems   Constitution: Negative. Negative for chills, fever and night sweats.   HENT: Negative for congestion and headaches.    Eyes: Negative for blurred vision, left vision loss and right vision loss.   Cardiovascular: Negative for chest pain and syncope.   Respiratory: Negative for cough and shortness of breath.    Endocrine: Negative for polydipsia, polyphagia and polyuria.   Hematologic/Lymphatic: Negative for bleeding problem. Does not bruise/bleed easily.   Skin: Negative for dry skin, itching and " rash.   Musculoskeletal: Negative for falls and muscle weakness.   Gastrointestinal: Negative for abdominal pain and bowel incontinence.   Genitourinary: Negative for bladder incontinence and nocturia.   Neurological: Negative for disturbances in coordination, loss of balance and seizures.   Psychiatric/Behavioral: Negative for depression. The patient does not have insomnia.    Allergic/Immunologic: Negative for hives and persistent infections.     PHYSICAL EXAM:  GEN: A&Ox3, WD WN NAD  HEENT: WNL  CHEST: CTAB, no W/R/R  HEART: RRR, no M/R/G  ABD: Soft, NT ND, BS x4 QUADS  MS; See Epic  NEURO: CN II-XII intact       The surgical consent was then reviewed with the patient, who agreed with all the contents of the consent form and it was signed. she was then given the surgery packet to bring with her to surgery center for the anesthesia portion of her perioperative paperwork (if needed)   For all physicians except for Dr. De La Cruz, we will email and possibly fax the consent forms and booking sheets to [facility].    The patient was given the opportunity to ask questions about the surgical plan and consent form, and once no other questions were asked, I proceeded with the pre-op appointment.    PHYSICAL THERAPY:  She was also instructed regarding physical therapy and will begin on  3 weeks post-op. She was given a copy of the original prescription to schedule. Another copy of this prescription was also faxed to [facility].    POST OP CARE:instructions were reviewed including care of the wound and dressing after surgery and when she can shower. Patient was told not sleep or lay on there surgical extremity following surgery as this could cause repair damage, tissue damage, or nerve injury.    An extensive amount of time was spent on discussion of the following information based on what type of surgery the patient was having. Patient expressed understanding of the material below:    Shoulder surgery or upper  extremity surgery requiring post-op sling:  It was explained to the patient that they should remove their arm from the sling approximately 6 times per day to do full elbow ROM (flexion and extension) and full supination and pronation of the elbow for approximately 5 minutes at a time to help prevent elbow stiffness, nerve pain or problems, or nerve injury. They were told to contact us if they begin having numbness and tingling of there surgical extremity that persists longer then 1 day without relief.     Extremity surgery requiring a splint:   It was explained to patient on how to properly elevated position there extremity to prevent pressure ulcers from occurring. I made sure that the patient understood that that surgical site may be numb following surgery and prevent them from feeling pressure pain that they would normal feel if a pressure injury was occurring. Pressure ulcers and there causes were discussed with the patient today.     Post-operative splint:  It was explain to the patient that they can contact us at anytime if they feel that there is a problem with their splint or under their splint that needs evaluation. If there is concern, questions, or discomfort with the splint then they can present to either our clinic or the [facility] ED for removal, evaluation, and replacement of the splint.    CRUTCHES OR WALKER: It was explained to the patient that if they are having a lower extremity surgery that they will require either a walker or crutches to ambulate safely with after surgery. It was explained that a cane or other assistive devices are not sufficient to safely ambulate with after surgery. I explained to the patient that I will place an order for them to receive either crutches or a walker after surgery to go home with. It was explained that if they have crutches or a walker at home already, that they are REQUIRED to bring them to the hospital on the day of surgery. It was explained that if  they do not have them at the hospital on the day of surgery that they WILL be provided a new pair or crutches or a walker to go home with to ensure ambulation will be safe if the patient needs to stop somewhere on the way home.      PAIN MANAGEMENT: Ivy Salgado was also given a pain management regime, which includes the option of getting a TENS unit given to her by [facility] (if patient chooses) along with the education required for its use. She was also instructed regarding the Polar ice unit or gel ice packs (chosen by patient) that will be in place after surgery and her postoperative pain medications.     Patient understands that Polar Ice machine has to be bought today if they want it. It cannot be bought on day of surgery at surgery center.     PAIN MEDICATION:  Percocet 10/325mg 1 po q 4-6 hours prn pain  Ultram 50 mg Take 1-2 p.o. q.6 hours p.r.n. breakthrough pain,   Phenergan 25 mg one p.o. q.6 hours p.r.n. nausea and vomiting.    DVT prophylaxis was discussed with the patient today including risk factors for developing DVTs and history of DVTs. The patient was asked if any specific recommendations were given from the doctor/s that did pre-operative surgical clearance. The patient was then given an education sheet about DVTs and PE with warning signs and symptoms of both and steps to take if they suspect either of these.    This along with the Modified Caprini risk assessment model for VTE in general surgical patients was used to determine the patient's DVT risk.     From: Julian JONES, Jeremy DA, Jodee SM, et al. Prevention of VTE in nonorthopedic surgical patients: antithrombotic therapy and prevention of thrombosis, 9th ed: American College of Chest [employer]icians evidence-based clinical practical guidelines. Chest 2012; 141:e227S. Copyright © 2012. Reproduced with permission from the American College of Chest Physicians.    The below listed DVT prophylaxis regimen along with bilateral JEANNA compression  stockings will be used post-op. Length of treatment has been determined to be 10-42 days post-op by the above noted Caprini assessment model.     The patient was instructed to buy and take:  Aspirin 81mg QD x 6 weeks for DVT prophylaxis starting on the morning after surgery.  Patient will also use bilateral TEDs on lower extremities, SCDs during surgery, and early ambulation post-op. If the patient was previously taking 81mg baby aspirin, they were told to not take it starting 5 days prior to surgery and to restart the 81mg aspirin after surgery.       Patient was also told to buy over the counter Prilosec medication if needed and take it once daily for GI protection as long as they are taking NSAIDs or Aspirin.   Patient denies history of seizures.      The patient was told that narcotic pain medications may make them drowsy and instructions were given to not sign legal documents, drive or operate heavy machinery, cars, or equipment while under the influence of narcotic medications. The patient was told and understands that narcotic pain medications should only be used as needed to control pain and that other options of pain control include TENs unit and ice packs/unit.     As there were no other questions to be asked, she was given my business card along with Sharon Babb MD business card if she has any questions or concerns prior to surgery or in the postop period.     At the end of our encounter today, the patient was given the option and asked if they would like to see the surgeon regarding questions or concerns that they have about the consent form or the surgical procedure. The patient did not need to see Dr. Babb prior to surgery but was given the opportunity.

## 2023-07-18 ENCOUNTER — ANESTHESIA (OUTPATIENT)
Dept: SURGERY | Facility: HOSPITAL | Age: 43
End: 2023-07-18
Payer: COMMERCIAL

## 2023-07-18 ENCOUNTER — HOSPITAL ENCOUNTER (OUTPATIENT)
Facility: HOSPITAL | Age: 43
Discharge: HOME | End: 2023-07-19
Attending: ORTHOPAEDIC SURGERY | Admitting: ORTHOPAEDIC SURGERY
Payer: COMMERCIAL

## 2023-07-18 DIAGNOSIS — M25.512 CHRONIC LEFT SHOULDER PAIN: ICD-10-CM

## 2023-07-18 DIAGNOSIS — M87.019 AVASCULAR NECROSIS OF BONE OF SHOULDER: ICD-10-CM

## 2023-07-18 DIAGNOSIS — G89.29 CHRONIC LEFT SHOULDER PAIN: ICD-10-CM

## 2023-07-18 DIAGNOSIS — T38.0X5A AVASCULAR NECROSIS OF LEFT SHOULDER DUE TO ADVERSE EFFECT OF STEROID THERAPY: Primary | ICD-10-CM

## 2023-07-18 DIAGNOSIS — M87.112 AVASCULAR NECROSIS OF LEFT SHOULDER DUE TO ADVERSE EFFECT OF STEROID THERAPY: Primary | ICD-10-CM

## 2023-07-18 PROBLEM — M75.22 BICEPS TENDONITIS ON LEFT: Status: ACTIVE | Noted: 2023-07-18

## 2023-07-18 PROCEDURE — C1713 ANCHOR/SCREW BN/BN,TIS/BN: HCPCS | Performed by: ORTHOPAEDIC SURGERY

## 2023-07-18 PROCEDURE — D9220A PRA ANESTHESIA: Mod: ANES,,, | Performed by: ANESTHESIOLOGY

## 2023-07-18 PROCEDURE — 63600175 PHARM REV CODE 636 W HCPCS: Performed by: NURSE ANESTHETIST, CERTIFIED REGISTERED

## 2023-07-18 PROCEDURE — 27201423 OPTIME MED/SURG SUP & DEVICES STERILE SUPPLY: Performed by: ORTHOPAEDIC SURGERY

## 2023-07-18 PROCEDURE — 63600175 PHARM REV CODE 636 W HCPCS: Performed by: PHYSICIAN ASSISTANT

## 2023-07-18 PROCEDURE — 23472 PR RECONSTR TOTAL SHOULDER IMPLANT: ICD-10-PCS | Mod: 22,LT,, | Performed by: ORTHOPAEDIC SURGERY

## 2023-07-18 PROCEDURE — 23430 REPAIR BICEPS TENDON: CPT | Mod: 59,LT,, | Performed by: ORTHOPAEDIC SURGERY

## 2023-07-18 PROCEDURE — 25000003 PHARM REV CODE 250: Performed by: STUDENT IN AN ORGANIZED HEALTH CARE EDUCATION/TRAINING PROGRAM

## 2023-07-18 PROCEDURE — 63600175 PHARM REV CODE 636 W HCPCS: Performed by: ANESTHESIOLOGY

## 2023-07-18 PROCEDURE — D9220A PRA ANESTHESIA: ICD-10-PCS | Mod: CRNA,,, | Performed by: NURSE ANESTHETIST, CERTIFIED REGISTERED

## 2023-07-18 PROCEDURE — 23472 RECONSTRUCT SHOULDER JOINT: CPT | Mod: 22,LT,, | Performed by: ORTHOPAEDIC SURGERY

## 2023-07-18 PROCEDURE — 25000003 PHARM REV CODE 250: Performed by: NURSE ANESTHETIST, CERTIFIED REGISTERED

## 2023-07-18 PROCEDURE — D9220A PRA ANESTHESIA: Mod: CRNA,,, | Performed by: NURSE ANESTHETIST, CERTIFIED REGISTERED

## 2023-07-18 PROCEDURE — 36000710: Performed by: ORTHOPAEDIC SURGERY

## 2023-07-18 PROCEDURE — 99900035 HC TECH TIME PER 15 MIN (STAT)

## 2023-07-18 PROCEDURE — A4216 STERILE WATER/SALINE, 10 ML: HCPCS | Performed by: NURSE ANESTHETIST, CERTIFIED REGISTERED

## 2023-07-18 PROCEDURE — 25000003 PHARM REV CODE 250: Performed by: ANESTHESIOLOGY

## 2023-07-18 PROCEDURE — 25000003 PHARM REV CODE 250: Performed by: PHYSICIAN ASSISTANT

## 2023-07-18 PROCEDURE — 23430 PR REPAIR BICEPS LONG TENDON: ICD-10-PCS | Mod: 59,LT,, | Performed by: ORTHOPAEDIC SURGERY

## 2023-07-18 PROCEDURE — 63600175 PHARM REV CODE 636 W HCPCS: Performed by: ORTHOPAEDIC SURGERY

## 2023-07-18 PROCEDURE — 37000008 HC ANESTHESIA 1ST 15 MINUTES: Performed by: ORTHOPAEDIC SURGERY

## 2023-07-18 PROCEDURE — 94761 N-INVAS EAR/PLS OXIMETRY MLT: CPT

## 2023-07-18 PROCEDURE — 71000033 HC RECOVERY, INTIAL HOUR: Performed by: ORTHOPAEDIC SURGERY

## 2023-07-18 PROCEDURE — 27100025 HC TUBING, SET FLUID WARMER: Performed by: ANESTHESIOLOGY

## 2023-07-18 PROCEDURE — 36000711: Performed by: ORTHOPAEDIC SURGERY

## 2023-07-18 PROCEDURE — 25000003 PHARM REV CODE 250: Performed by: ORTHOPAEDIC SURGERY

## 2023-07-18 PROCEDURE — 37000009 HC ANESTHESIA EA ADD 15 MINS: Performed by: ORTHOPAEDIC SURGERY

## 2023-07-18 PROCEDURE — 71000039 HC RECOVERY, EACH ADD'L HOUR: Performed by: ORTHOPAEDIC SURGERY

## 2023-07-18 PROCEDURE — C1776 JOINT DEVICE (IMPLANTABLE): HCPCS | Performed by: ORTHOPAEDIC SURGERY

## 2023-07-18 PROCEDURE — D9220A PRA ANESTHESIA: ICD-10-PCS | Mod: ANES,,, | Performed by: ANESTHESIOLOGY

## 2023-07-18 DEVICE — IMPLANTABLE DEVICE: Type: IMPLANTABLE DEVICE | Site: SHOULDER | Status: FUNCTIONAL

## 2023-07-18 DEVICE — CEMENT BONE SMPLX HV GENTMYCN: Type: IMPLANTABLE DEVICE | Site: SHOULDER | Status: FUNCTIONAL

## 2023-07-18 RX ORDER — VANCOMYCIN HYDROCHLORIDE 1 G/20ML
INJECTION, POWDER, LYOPHILIZED, FOR SOLUTION INTRAVENOUS
Status: DISCONTINUED | OUTPATIENT
Start: 2023-07-18 | End: 2023-07-18 | Stop reason: HOSPADM

## 2023-07-18 RX ORDER — CARBOXYMETHYLCELLULOSE SODIUM 10 MG/ML
GEL OPHTHALMIC
Status: DISCONTINUED | OUTPATIENT
Start: 2023-07-18 | End: 2023-07-18

## 2023-07-18 RX ORDER — CLINDAMYCIN HYDROCHLORIDE 150 MG/1
450 CAPSULE ORAL
Status: COMPLETED | OUTPATIENT
Start: 2023-07-18 | End: 2023-07-18

## 2023-07-18 RX ORDER — METHOCARBAMOL 500 MG/1
1000 TABLET, FILM COATED ORAL ONCE AS NEEDED
Status: COMPLETED | OUTPATIENT
Start: 2023-07-18 | End: 2023-07-18

## 2023-07-18 RX ORDER — OXYCODONE HYDROCHLORIDE 5 MG/1
10 TABLET ORAL EVERY 4 HOURS PRN
Status: DISCONTINUED | OUTPATIENT
Start: 2023-07-18 | End: 2023-07-18 | Stop reason: HOSPADM

## 2023-07-18 RX ORDER — NICARDIPINE HYDROCHLORIDE 2.5 MG/ML
INJECTION INTRAVENOUS
Status: DISCONTINUED | OUTPATIENT
Start: 2023-07-18 | End: 2023-07-18

## 2023-07-18 RX ORDER — KETAMINE HCL IN 0.9 % NACL 50 MG/5 ML
SYRINGE (ML) INTRAVENOUS
Status: DISCONTINUED | OUTPATIENT
Start: 2023-07-18 | End: 2023-07-18

## 2023-07-18 RX ORDER — OXYCODONE HYDROCHLORIDE 10 MG/1
10 TABLET ORAL EVERY 4 HOURS PRN
Status: DISCONTINUED | OUTPATIENT
Start: 2023-07-18 | End: 2023-07-19 | Stop reason: HOSPADM

## 2023-07-18 RX ORDER — MIDAZOLAM HYDROCHLORIDE 1 MG/ML
INJECTION INTRAMUSCULAR; INTRAVENOUS
Status: DISCONTINUED | OUTPATIENT
Start: 2023-07-18 | End: 2023-07-18

## 2023-07-18 RX ORDER — FENTANYL CITRATE 50 UG/ML
INJECTION, SOLUTION INTRAMUSCULAR; INTRAVENOUS
Status: DISCONTINUED | OUTPATIENT
Start: 2023-07-18 | End: 2023-07-18

## 2023-07-18 RX ORDER — DEXMEDETOMIDINE HYDROCHLORIDE 100 UG/ML
INJECTION, SOLUTION INTRAVENOUS
Status: DISCONTINUED | OUTPATIENT
Start: 2023-07-18 | End: 2023-07-18

## 2023-07-18 RX ORDER — LIDOCAINE HYDROCHLORIDE 20 MG/ML
INJECTION INTRAVENOUS
Status: DISCONTINUED | OUTPATIENT
Start: 2023-07-18 | End: 2023-07-18

## 2023-07-18 RX ORDER — SODIUM CHLORIDE 0.9 % (FLUSH) 0.9 %
3 SYRINGE (ML) INJECTION
Status: DISCONTINUED | OUTPATIENT
Start: 2023-07-18 | End: 2023-07-18 | Stop reason: HOSPADM

## 2023-07-18 RX ORDER — SODIUM CHLORIDE 9 MG/ML
INJECTION, SOLUTION INTRAVENOUS CONTINUOUS
Status: DISCONTINUED | OUTPATIENT
Start: 2023-07-18 | End: 2023-07-18

## 2023-07-18 RX ORDER — HYDROMORPHONE HYDROCHLORIDE 1 MG/ML
0.2 INJECTION, SOLUTION INTRAMUSCULAR; INTRAVENOUS; SUBCUTANEOUS EVERY 5 MIN PRN
Status: DISCONTINUED | OUTPATIENT
Start: 2023-07-18 | End: 2023-07-18 | Stop reason: HOSPADM

## 2023-07-18 RX ORDER — ROCURONIUM BROMIDE 10 MG/ML
INJECTION, SOLUTION INTRAVENOUS
Status: DISCONTINUED | OUTPATIENT
Start: 2023-07-18 | End: 2023-07-18

## 2023-07-18 RX ORDER — PROMETHAZINE HYDROCHLORIDE 25 MG/1
25 TABLET ORAL EVERY 6 HOURS PRN
Status: DISCONTINUED | OUTPATIENT
Start: 2023-07-18 | End: 2023-07-19 | Stop reason: HOSPADM

## 2023-07-18 RX ORDER — OXYCODONE HCL 10 MG/1
10 TABLET, FILM COATED, EXTENDED RELEASE ORAL
Status: COMPLETED | OUTPATIENT
Start: 2023-07-18 | End: 2023-07-18

## 2023-07-18 RX ORDER — FAMOTIDINE 10 MG/ML
INJECTION INTRAVENOUS
Status: DISCONTINUED | OUTPATIENT
Start: 2023-07-18 | End: 2023-07-18

## 2023-07-18 RX ORDER — PREGABALIN 75 MG/1
75 CAPSULE ORAL
Status: COMPLETED | OUTPATIENT
Start: 2023-07-18 | End: 2023-07-18

## 2023-07-18 RX ORDER — HALOPERIDOL 5 MG/ML
0.5 INJECTION INTRAMUSCULAR EVERY 10 MIN PRN
Status: DISCONTINUED | OUTPATIENT
Start: 2023-07-18 | End: 2023-07-18 | Stop reason: HOSPADM

## 2023-07-18 RX ORDER — CLINDAMYCIN PHOSPHATE 900 MG/50ML
900 INJECTION, SOLUTION INTRAVENOUS
Status: DISCONTINUED | OUTPATIENT
Start: 2023-07-18 | End: 2023-07-18

## 2023-07-18 RX ORDER — PREGABALIN 75 MG/1
75 CAPSULE ORAL ONCE
Status: COMPLETED | OUTPATIENT
Start: 2023-07-18 | End: 2023-07-18

## 2023-07-18 RX ORDER — OXYCODONE HYDROCHLORIDE 10 MG/1
10 TABLET ORAL EVERY 4 HOURS PRN
Status: DISCONTINUED | OUTPATIENT
Start: 2023-07-18 | End: 2023-07-18 | Stop reason: SDUPTHER

## 2023-07-18 RX ORDER — PROPOFOL 10 MG/ML
VIAL (ML) INTRAVENOUS CONTINUOUS PRN
Status: DISCONTINUED | OUTPATIENT
Start: 2023-07-18 | End: 2023-07-18

## 2023-07-18 RX ORDER — ROPIVACAINE HYDROCHLORIDE 5 MG/ML
INJECTION, SOLUTION EPIDURAL; INFILTRATION; PERINEURAL
Status: DISCONTINUED | OUTPATIENT
Start: 2023-07-18 | End: 2023-07-18 | Stop reason: HOSPADM

## 2023-07-18 RX ORDER — ZOLPIDEM TARTRATE 5 MG/1
10 TABLET ORAL NIGHTLY
Status: DISCONTINUED | OUTPATIENT
Start: 2023-07-18 | End: 2023-07-19 | Stop reason: HOSPADM

## 2023-07-18 RX ORDER — KETOROLAC TROMETHAMINE 30 MG/ML
30 INJECTION, SOLUTION INTRAMUSCULAR; INTRAVENOUS ONCE
Status: COMPLETED | OUTPATIENT
Start: 2023-07-18 | End: 2023-07-18

## 2023-07-18 RX ORDER — TRAMADOL HYDROCHLORIDE 50 MG/1
100 TABLET ORAL EVERY 6 HOURS PRN
Status: CANCELLED | OUTPATIENT
Start: 2023-07-18

## 2023-07-18 RX ORDER — MORPHINE SULFATE 2 MG/ML
2 INJECTION, SOLUTION INTRAMUSCULAR; INTRAVENOUS EVERY 10 MIN PRN
Status: DISCONTINUED | OUTPATIENT
Start: 2023-07-18 | End: 2023-07-18

## 2023-07-18 RX ORDER — LIDOCAINE HYDROCHLORIDE 10 MG/ML
INJECTION, SOLUTION EPIDURAL; INFILTRATION; INTRACAUDAL; PERINEURAL
Status: DISPENSED
Start: 2023-07-18 | End: 2023-07-18

## 2023-07-18 RX ORDER — MORPHINE SULFATE 2 MG/ML
2 INJECTION, SOLUTION INTRAMUSCULAR; INTRAVENOUS
Status: DISCONTINUED | OUTPATIENT
Start: 2023-07-19 | End: 2023-07-19 | Stop reason: HOSPADM

## 2023-07-18 RX ORDER — DEXAMETHASONE SODIUM PHOSPHATE 4 MG/ML
INJECTION, SOLUTION INTRA-ARTICULAR; INTRALESIONAL; INTRAMUSCULAR; INTRAVENOUS; SOFT TISSUE
Status: DISCONTINUED | OUTPATIENT
Start: 2023-07-18 | End: 2023-07-18

## 2023-07-18 RX ORDER — PROPOFOL 10 MG/ML
VIAL (ML) INTRAVENOUS
Status: DISCONTINUED | OUTPATIENT
Start: 2023-07-18 | End: 2023-07-18

## 2023-07-18 RX ORDER — DIPHENHYDRAMINE HYDROCHLORIDE 50 MG/ML
6.25 INJECTION INTRAMUSCULAR; INTRAVENOUS
Status: COMPLETED | OUTPATIENT
Start: 2023-07-18 | End: 2023-07-18

## 2023-07-18 RX ORDER — KETAMINE HYDROCHLORIDE 100 MG/ML
INJECTION, SOLUTION INTRAMUSCULAR; INTRAVENOUS
Status: DISCONTINUED | OUTPATIENT
Start: 2023-07-18 | End: 2023-07-18 | Stop reason: HOSPADM

## 2023-07-18 RX ORDER — KETOROLAC TROMETHAMINE 30 MG/ML
INJECTION, SOLUTION INTRAMUSCULAR; INTRAVENOUS
Status: DISCONTINUED | OUTPATIENT
Start: 2023-07-18 | End: 2023-07-18 | Stop reason: HOSPADM

## 2023-07-18 RX ORDER — TRAMADOL HYDROCHLORIDE 50 MG/1
100 TABLET ORAL EVERY 6 HOURS PRN
Status: DISCONTINUED | OUTPATIENT
Start: 2023-07-18 | End: 2023-07-19 | Stop reason: HOSPADM

## 2023-07-18 RX ORDER — DRONABINOL 2.5 MG/1
5 CAPSULE ORAL
Status: DISCONTINUED | OUTPATIENT
Start: 2023-07-18 | End: 2023-07-19 | Stop reason: HOSPADM

## 2023-07-18 RX ORDER — LABETALOL HYDROCHLORIDE 5 MG/ML
INJECTION, SOLUTION INTRAVENOUS
Status: DISCONTINUED | OUTPATIENT
Start: 2023-07-18 | End: 2023-07-18

## 2023-07-18 RX ORDER — NEOSTIGMINE METHYLSULFATE 1 MG/ML
INJECTION, SOLUTION INTRAVENOUS
Status: DISCONTINUED | OUTPATIENT
Start: 2023-07-18 | End: 2023-07-18

## 2023-07-18 RX ADMIN — NICARDIPINE HYDROCHLORIDE 0.4 MCG: 25 INJECTION, SOLUTION INTRAVENOUS at 08:07

## 2023-07-18 RX ADMIN — LABETALOL HYDROCHLORIDE 5 MG: 5 INJECTION, SOLUTION INTRAVENOUS at 08:07

## 2023-07-18 RX ADMIN — GLYCOPYRROLATE 0.1 MG: 0.2 INJECTION, SOLUTION INTRAMUSCULAR; INTRAVENOUS at 06:07

## 2023-07-18 RX ADMIN — FAMOTIDINE 20 MG: 10 INJECTION, SOLUTION INTRAVENOUS at 06:07

## 2023-07-18 RX ADMIN — DRONABINOL 5 MG: 2.5 CAPSULE ORAL at 04:07

## 2023-07-18 RX ADMIN — ROCURONIUM BROMIDE 50 MG: 10 INJECTION, SOLUTION INTRAVENOUS at 07:07

## 2023-07-18 RX ADMIN — PROPOFOL 120 MG: 10 INJECTION, EMULSION INTRAVENOUS at 07:07

## 2023-07-18 RX ADMIN — METHOCARBAMOL 1000 MG: 500 TABLET ORAL at 11:07

## 2023-07-18 RX ADMIN — OXYCODONE HYDROCHLORIDE 10 MG: 10 TABLET, FILM COATED, EXTENDED RELEASE ORAL at 06:07

## 2023-07-18 RX ADMIN — CEFAZOLIN 2 G: 2 INJECTION, POWDER, FOR SOLUTION INTRAMUSCULAR; INTRAVENOUS at 03:07

## 2023-07-18 RX ADMIN — CARBOXYMETHYLCELLULOSE SODIUM 4 DROP: 10 GEL OPHTHALMIC at 07:07

## 2023-07-18 RX ADMIN — FENTANYL CITRATE 50 MCG: 50 INJECTION, SOLUTION INTRAMUSCULAR; INTRAVENOUS at 08:07

## 2023-07-18 RX ADMIN — Medication 20 MG: at 07:07

## 2023-07-18 RX ADMIN — DIPHENHYDRAMINE HYDROCHLORIDE 6.25 MG: 50 INJECTION INTRAMUSCULAR; INTRAVENOUS at 06:07

## 2023-07-18 RX ADMIN — OXYCODONE HYDROCHLORIDE 10 MG: 10 TABLET ORAL at 03:07

## 2023-07-18 RX ADMIN — PREGABALIN 75 MG: 75 CAPSULE ORAL at 06:07

## 2023-07-18 RX ADMIN — GLYCOPYRROLATE 0.4 MG: 0.2 INJECTION, SOLUTION INTRAMUSCULAR; INTRAVENOUS at 09:07

## 2023-07-18 RX ADMIN — HYDROMORPHONE HYDROCHLORIDE 0.2 MG: 1 INJECTION, SOLUTION INTRAMUSCULAR; INTRAVENOUS; SUBCUTANEOUS at 11:07

## 2023-07-18 RX ADMIN — MIDAZOLAM HYDROCHLORIDE 2 MG: 1 INJECTION, SOLUTION INTRAMUSCULAR; INTRAVENOUS at 06:07

## 2023-07-18 RX ADMIN — DEXMEDETOMIDINE 12 MCG: 100 INJECTION, SOLUTION, CONCENTRATE INTRAVENOUS at 07:07

## 2023-07-18 RX ADMIN — OXYCODONE HYDROCHLORIDE 5 MG: 5 TABLET ORAL at 11:07

## 2023-07-18 RX ADMIN — HALOPERIDOL LACTATE 0.5 MG: 5 INJECTION, SOLUTION INTRAMUSCULAR at 11:07

## 2023-07-18 RX ADMIN — MORPHINE SULFATE 2 MG: 2 INJECTION, SOLUTION INTRAMUSCULAR; INTRAVENOUS at 04:07

## 2023-07-18 RX ADMIN — PREGABALIN 75 MG: 75 CAPSULE ORAL at 11:07

## 2023-07-18 RX ADMIN — KETOROLAC TROMETHAMINE 30 MG: 30 INJECTION, SOLUTION INTRAMUSCULAR; INTRAVENOUS at 12:07

## 2023-07-18 RX ADMIN — CLINDAMYCIN HYDROCHLORIDE 450 MG: 150 CAPSULE ORAL at 06:07

## 2023-07-18 RX ADMIN — TRAMADOL HYDROCHLORIDE 100 MG: 50 TABLET, COATED ORAL at 06:07

## 2023-07-18 RX ADMIN — FENTANYL CITRATE 100 MCG: 50 INJECTION, SOLUTION INTRAMUSCULAR; INTRAVENOUS at 07:07

## 2023-07-18 RX ADMIN — SODIUM CHLORIDE 0.2 MCG/KG/HR: 9 INJECTION INTRAMUSCULAR; INTRAVENOUS; SUBCUTANEOUS at 07:07

## 2023-07-18 RX ADMIN — OXYCODONE HYDROCHLORIDE 10 MG: 10 TABLET ORAL at 08:07

## 2023-07-18 RX ADMIN — SODIUM CHLORIDE: 0.9 INJECTION, SOLUTION INTRAVENOUS at 06:07

## 2023-07-18 RX ADMIN — DEXAMETHASONE SODIUM PHOSPHATE 8 MG: 4 INJECTION, SOLUTION INTRAMUSCULAR; INTRAVENOUS at 07:07

## 2023-07-18 RX ADMIN — MORPHINE SULFATE 2 MG: 2 INJECTION, SOLUTION INTRAMUSCULAR; INTRAVENOUS at 09:07

## 2023-07-18 RX ADMIN — LIDOCAINE HYDROCHLORIDE 80 MG: 20 INJECTION INTRAVENOUS at 07:07

## 2023-07-18 RX ADMIN — SODIUM CHLORIDE, SODIUM GLUCONATE, SODIUM ACETATE, POTASSIUM CHLORIDE, MAGNESIUM CHLORIDE, SODIUM PHOSPHATE, DIBASIC, AND POTASSIUM PHOSPHATE: .53; .5; .37; .037; .03; .012; .00082 INJECTION, SOLUTION INTRAVENOUS at 07:07

## 2023-07-18 RX ADMIN — PROPOFOL 200 MCG/KG/MIN: 10 INJECTION, EMULSION INTRAVENOUS at 07:07

## 2023-07-18 RX ADMIN — PROPOFOL 30 MG: 10 INJECTION, EMULSION INTRAVENOUS at 10:07

## 2023-07-18 RX ADMIN — CEFAZOLIN 2 G: 2 INJECTION, POWDER, FOR SOLUTION INTRAMUSCULAR; INTRAVENOUS at 07:07

## 2023-07-18 RX ADMIN — NEOSTIGMINE METHYLSULFATE 3 MG: 1 INJECTION INTRAVENOUS at 09:07

## 2023-07-18 NOTE — ANESTHESIA PREPROCEDURE EVALUATION
07/18/2023  Pre-operative evaluation for Procedure(s) (LRB):  ARTHROPLASTY, SHOULDER (Left)  FIXATION, TENDON (Left)    Ivy Salgado is a 43 y.o. female     Patient Active Problem List   Diagnosis    S/P ileostomy    Crohn's disease of small intestine    Generalized anxiety disorder    Herpes genitalis in women    Joint pain    Fatigue    Abdominal pain    Crohn's disease of small intestine with complication    Current mild episode of major depressive disorder without prior episode    Appetite impaired    Vitamin D deficiency    Palpitations    Sinus tachycardia    Multiple thyroid nodules    Urinary tract infection with hematuria    Osteopenia of multiple sites    Diarrhea    Transient neurological symptoms    Chronic, continuous use of opioids    Gastroesophageal reflux disease    SOBOE (shortness of breath on exertion)    Poor venous access    Alkaline phosphatase elevation- based on lab from 4/9/2019     S/P ExLap, BSO, 5/30/19    Premature surgical menopause    Complex partial epilepsy with generalization and with intractable epilepsy    History of recurrent UTI (urinary tract infection)    Seizures    Pelvic pain in female    Left ureteral stone    Ureteral stone    Pain due to ureteral stent    History of prolonged Q-T interval on ECG    Pyelonephritis    Fall    Acute pain of left shoulder    Post concussion syndrome    Right foot pain       Review of patient's allergies indicates:   Allergen Reactions    Azathioprine sodium Other (See Comments)     Other reaction(s): pancreatitis  Other reaction(s): pancreatitis    Methotrexate analogues Other (See Comments)     leukopenia    Stelara [ustekinumab] Other (See Comments)     Multiple infections    Zofran [ondansetron hcl (pf)] Other (See Comments)     Per patient causes prolong QT    Vancomycin analogues  Other (See Comments)     Made her red    Azathioprine      Other reaction(s): Unknown    Methotrexate      Other reaction(s): infection-    Morphine Itching and Other (See Comments)     Other reaction(s): Itching    Zofran [ondansetron hcl]      Other reaction(s): Hives    Bactrim [sulfamethoxazole-trimethoprim] Palpitations    Ciprofloxacin Palpitations       No current facility-administered medications on file prior to encounter.     Current Outpatient Medications on File Prior to Encounter   Medication Sig Dispense Refill    droNABinol (MARINOL) 5 MG capsule Take 1 capsule (5 mg total) by mouth 2 (two) times daily before meals. 60 capsule 1    gabapentin (NEURONTIN) 300 MG capsule Take 1 capsule (300 mg total) by mouth 2 (two) times daily. 60 capsule 11    mirtazapine (REMERON SOL-TAB) 30 MG disintegrating tablet Take 1 tablet (30 mg total) by mouth nightly. 90 tablet 2    multivitamin (THERAGRAN) per tablet Take 1 tablet by mouth once daily.      nadoloL (CORGARD) 40 MG tablet Take 1 tablet (40 mg total) by mouth once daily. 30 tablet 11    pantoprazole (PROTONIX) 40 MG tablet Take 1 tablet (40 mg total) by mouth 2 (two) times daily. 60 tablet 12    boric acid (BORIC ACID) vaginal suppository Place 1 each (650 mg total) vaginally nightly as needed. (Patient not taking: Reported on 7/10/2023) 30 suppository 12    diphenoxylate-atropine 2.5-0.025 mg (LOMOTIL) 2.5-0.025 mg per tablet Take 1 tablet by mouth 4 (four) times daily as needed for Diarrhea. for diarrhea (Patient not taking: Reported on 7/10/2023) 100 tablet 0    fluconazole (DIFLUCAN) 150 MG Tab Take 1 tablet (150 mg total) by mouth every 72 hours as needed (vaginal yeast). (Patient not taking: Reported on 7/10/2023) 3 tablet 0    loperamide (IMODIUM) 2 mg capsule Take 2 mg by mouth daily as needed for Diarrhea.      sumatriptan (IMITREX) 50 MG tablet TAKE 1 TABLET BY MOUTH AS NEEDED FOR MIGRAINE. MAY REPEAT ONCE IN 2 HRS. DO NOT EXCEED 2  TABS IN 24 HRS (Patient not taking: Reported on 7/10/2023) 12 tablet 0    vedolizumab (ENTYVIO) 300 mg SolR injection Inject 300 mg into the vein.         Past Surgical History:   Procedure Laterality Date    ABDOMINAL SURGERY      APPENDECTOMY      AUGMENTATION OF BREAST Bilateral 06/2022    gel implants    BILATERAL SALPINGO-OOPHORECTOMY (BSO) Bilateral 05/30/2019    Procedure: SALPINGO-OOPHORECTOMY, BILATERAL;  Surgeon: Rupa German MD;  Location: [facility] OR [address] [place] [address];  Service: OB/GYN;  Laterality: Bilateral;    BLADDER SURGERY      partial cystectomy due to fistula    breast lift      BREAST SURGERY      [facility]      COLON SURGERY      COLONOSCOPY      CYSTOSCOPY  09/23/2020    Procedure: CYSTOSCOPY;  Surgeon: Sascha Florentino MD;  Location: [facility] OR [address] [place] [address];  Service: Urology;;    CYSTOSCOPY W/ URETERAL STENT PLACEMENT Left 09/15/2020    Procedure: CYSTOSCOPY, WITH URETERAL STENT INSERTION;  Surgeon: Sascha Florentino MD;  Location: [facility] OR [address] [place] [address];  Service: Urology;  Laterality: Left;    DIAGNOSTIC LAPAROSCOPY N/A 07/09/2020    Procedure: LAPAROSCOPY, DIAGNOSTIC;  Surgeon: Gilson El MD;  Location: [facility] OR;  Service: OB/GYN;  Laterality: N/A;    EXCISION OF MELANOMA  07/17/2019    ILEOSTOMY      LAPAROSCOPIC LYSIS OF ADHESIONS N/A 07/09/2020    Procedure: LYSIS, ADHESIONS, LAPAROSCOPIC;  Surgeon: Gilson El MD;  Location: [facility] OR;  Service: OB/GYN;  Laterality: N/A;    LASER LITHOTRIPSY  09/23/2020    Procedure: LITHOTRIPSY, USING LASER;  Surgeon: Sascha Florentino MD;  Location: [facility] OR [address] [place] [address];  Service: Urology;;    LYSIS OF ADHESIONS N/A 05/30/2019    Procedure: LYSIS, ADHESIONS;  Surgeon: Rupa German MD;  Location: [facility] OR [address] [place] [address];  Service: OB/GYN;  Laterality: N/A;    OOPHORECTOMY Right 04/16/2015    PORTACATH PLACEMENT  02/21/2017    SKIN BIOPSY      SMALL INTESTINE SURGERY      age 18 Y    TOTAL ABDOMINAL HYSTERECTOMY  04/16/2015    TOTAL COLECTOMY       TUBAL LIGATION  06/06/2012    UPPER GASTROINTESTINAL ENDOSCOPY      URETEROSCOPIC REMOVAL OF URETERIC CALCULUS  09/23/2020    Procedure: REMOVAL, CALCULUS, URETER, URETEROSCOPIC;  Surgeon: Sascha Florentino MD;  Location: [address];  Service: Urology;;    URETEROSCOPY Left 09/23/2020    Procedure: URETEROSCOPY;  Surgeon: Sascha Florentino MD;  Location: [facility] OR [address] [place] [address];  Service: Urology;  Laterality: Left;       Social History     Socioeconomic History    Marital status: Single   Occupational History     Employer: [employer]   Tobacco Use    Smoking status: Never    Smokeless tobacco: Never   Substance and Sexual Activity    Alcohol use: Not Currently     Alcohol/week: 0.0 standard drinks    Drug use: No    Sexual activity: Yes     Partners: Male     Birth control/protection: See Surgical Hx     Comment: HYST   Other Topics Concern    Are you pregnant or think you may be? No    Breast-feeding No     Social Determinants of Health     Financial Resource Strain: Medium Risk    Difficulty of Paying Living Expenses: Somewhat hard   Food Insecurity: No Food Insecurity    Worried About Running Out of Food in the Last Year: Never true    Ran Out of Food in the Last Year: Never true   Transportation Needs: No Transportation Needs    Lack of Transportation (Medical): No    Lack of Transportation (Non-Medical): No   Physical Activity: Inactive    Days of Exercise per Week: 0 days    Minutes of Exercise per Session: 30 min   Stress: Stress Concern Present    Feeling of Stress : Very much   Social Connections: Unknown    Frequency of Communication with Friends and Family: More than three times a week    Frequency of Social Gatherings with Friends and Family: Once a week    Active Member of Clubs or Organizations: No    Attends Club or Organization Meetings: 1 to 4 times per year    Marital Status:    Housing Stability: Low Risk     Unable to Pay for Housing in the  Last Year: No    Number of Places Lived in the Last Year: 2    Unstable Housing in the Last Year: No       EKD Echo:  No results found. However, due to the size of the patient record, not all encounters were searched. Please check Results Review for a complete set of results.        Pre-op Assessment    I have reviewed the Patient Summary Reports.     I have reviewed the Nursing Notes. I have reviewed the NPO Status.   I have reviewed the Medications.     Review of Systems  Anesthesia Hx:  Denies Family Hx of Anesthesia complications.   Denies Personal Hx of Anesthesia complications.   Cardiovascular:   Exercise tolerance: good Hypertension Denies Dysrhythmias.   Denies Angina.  Denies BABCOCK.    Pulmonary:   Denies COPD.  Denies Asthma.    Renal/:   renal calculi    Hepatic/GI:   GERD    Neurological:   Denies CVA. Seizures    Endocrine:   Denies Diabetes.    Psych:   anxiety depression          Physical Exam  General: Well nourished, Cooperative, Alert and Oriented    Airway:  Mallampati: II / I  Mouth Opening: Normal  Tongue: Normal  Neck ROM: Normal ROM    Dental:  Intact  BOTTOM RIGHT 2ND TOOTH CRACKED      Anesthesia Plan  Type of Anesthesia, risks & benefits discussed:    Anesthesia Type: Gen ETT  Intra-op Monitoring Plan: Standard ASA Monitors  Post Op Pain Control Plan: multimodal analgesia and IV/PO Opioids PRN  Induction:  IV  Airway Plan: Direct, Post-Induction  Informed Consent: Informed consent signed with the Patient and all parties understand the risks and agree with anesthesia plan.  All questions answered.   ASA Score: 2  Day of Surgery Review of History & Physical: H&P Update referred to the surgeon/provider.    Ready For Surgery From Anesthesia Perspective.     .

## 2023-07-18 NOTE — TRANSFER OF CARE
"Anesthesia Transfer of Care Note    Patient: Ivy Salgado    Procedure(s) Performed: Procedure(s) (LRB):  ARTHROPLASTY, SHOULDER (Left)    Patient location: PACU    Anesthesia Type: general    Transport from OR: Transported from OR on 6-10 L/min O2 by face mask with adequate spontaneous ventilation    Post pain: adequate analgesia    Post assessment: no apparent anesthetic complications and tolerated procedure well    Post vital signs: stable    Level of consciousness: sedated    Nausea/Vomiting: no nausea/vomiting    Complications: none    Transfer of care protocol was followed      Last vitals:   Visit Vitals  /77   Pulse 70   Temp 37.1 °C (98.7 °F) (Oral)   Resp 16   Ht 5' 1" (1.549 m)   Wt 48.5 kg (107 lb)   LMP 03/30/2015   SpO2 96%   Breastfeeding No   BMI 20.22 kg/m²     "

## 2023-07-18 NOTE — PLAN OF CARE
"Pt with allergy to Vancomycin States she gets red and itching. She is nervous about receiving it today. She did discuss this in her preop office visit. Pt states she is "on the fence about it" Would like to discuss with NOVA Stockton about it again  "

## 2023-07-18 NOTE — OPERATIVE NOTE ADDENDUM
Certification of Assistant at Surgery       Surgery Date: 7/18/2023     Participating Surgeons:  Surgeon(s) and Role:     * Sharon Babb MD - Primary    Procedures:  Procedure(s) (LRB):  ARTHROPLASTY, SHOULDER (Left)  FIXATION, TENDON (Left)    Assistant Surgeon's Certification of Necessity:  I understand that section 1842 (b) (6) (d) of the Social Security Act generally prohibits Medicare Part B reasonable charge payment for the services of assistants at surgery in teaching hospitals when qualified residents are available to furnish such services. I certify that the services for which payment is claimed were medically necessary, and that no qualified resident was available to perform the services. I further understand that these services are subject to post-payment review by the Medicare carrier.      BIB SIMPSON DO    07/18/2023  9:55 AM

## 2023-07-18 NOTE — NURSING TRANSFER
Nursing Transfer Note      7/18/2023   1:46 PM    Nurse giving handoff:Candice CAMPOS  Nurse receiving handoff:Keri    Reason patient is being transferred: extended recovery    Transfer To: 316    Transfer via stretcher    Transported by Candice RN and Nicole RN    Medicines sent: n/a    Any special needs or follow-up needed: n/a    Patient belongings transferred with patient: Yes    Chart send with patient: Yes    Notified: friend, father    Upon arrival to floor: patient oriented to room, call bell in reach, and bed in lowest position

## 2023-07-18 NOTE — NURSING
Report received. Care assumed. Patient arrived to unit AAOx4 in hospital bed from PACU. VSS, IVF infusing. Dressing to left shoulder, CDI.  Pt lying supine in stretcher. Assisted to bathroom via ambulation with stand by assist of 2 RN., . Pt denies pain or any other concerns at this time. See assessment. Patient oriented to room. Bed in lowest position, side rails up x2, bed wheels locked and call light within reach.  Pt instructed to call for assistance, verbalized understanding. NADN. Will continue to monitor.

## 2023-07-18 NOTE — PROGRESS NOTES
Tried to speak with patient regarding whether she wanted to stay overnight or go home. Patient very sleepy at this time. Will wait until father at bedside to discuss.

## 2023-07-18 NOTE — PLAN OF CARE
NOVA Stockton at  to speak with pt to discuss Vanco order and martina pt.States he discontinued vanco. Pt to receive benadryl IV now and will receive Ancef preop. Pt voiced her fear of receiving Vanco and agrees to med change.

## 2023-07-18 NOTE — BRIEF OP NOTE
[facility] - Surgery (Hospital)  Brief Operative Note    Surgery Date: 7/18/2023     Surgeon(s) and Role:     * Sharon Babb MD - Primary    Assisting Surgeon: None    Pre-op Diagnosis:  Biceps tendonitis on left [M75.22]  Avascular necrosis of bone of shoulder [M87.019]  Chronic left shoulder pain [M25.512, G89.29]    Post-op Diagnosis:  Post-Op Diagnosis Codes:     * Biceps tendonitis on left [M75.22]     * Avascular necrosis of bone of shoulder [M87.019]     * Chronic left shoulder pain [M25.512, G89.29]    Procedure(s) (LRB):  ARTHROPLASTY, SHOULDER (Left)  FIXATION, TENDON (Left)    Anesthesia: General    Operative Findings: see op note    Estimated Blood Loss: * No values recorded between 7/18/2023  7:54 AM and 7/18/2023  9:54 AM *         Specimens:   Specimen (24h ago, onward)      None              Discharge Note    OUTCOME: Patient tolerated treatment/procedure well without complication and is now ready for discharge.    DISPOSITION: Home or Self Care    FINAL DIAGNOSIS:  Avascular necrosis of bone of shoulder    FOLLOWUP: In clinic    DISCHARGE INSTRUCTIONS:    Discharge Procedure Orders   Diet general     Call MD for:  temperature >100.4     Call MD for:  persistent nausea and vomiting     Call MD for:  severe uncontrolled pain     Call MD for:  difficulty breathing, headache or visual disturbances     Call MD for:  redness, tenderness, or signs of infection (pain, swelling, redness, odor or green/yellow discharge around incision site)     Call MD for:  hives     Call MD for:  persistent dizziness or light-headedness     No driving, operating heavy equipment or signing legal documents while taking pain medication     Shower on day dressing removed (No bath)

## 2023-07-18 NOTE — OP NOTE
DATE OF PROCEDURE: 07/18/2023     SURGEON: Sharon Babb M.D.     ASSISTANT: JOSEE Diaz MD PGY6  ASSISTANT: SMA Caroline      PREOPERATIVE DIAGNOSES:   left  1. Shoulder glenohumeral arthritis  2. Shoulder adhesions.   3. Shoulder biceps tendonitis     POSTOPERATIVE DIAGNOSES:   left  1. Shoulder glenohumeral arthritis  2. Shoulder adhesions.   3. Shoulder biceps tendonitis     PROCEDURE PERFORMED:   left  1. Total shoulder arthroplasty (complex, -22 modifier)  2. Shoulder lysis of adhesions  3. Shoulder subpectoral biceps tenodesis (CPT 20432)     ANESTHESIA:  General with suprascapular nerve block     BLOOD LOSS: None.      COMPLICATIONS: None.      SPECIMENS: None.      DRAINS: None.      DISPOSITION: The patient was extubated and moved to Recovery Room in stable condition with compartments soft and cap refill less than a second in all digits.      COMPLEX PROCEDURE:  There was an altered surgical field. There was abnormal anatomy with significant preferential reaming and contouring due to the bony deformity of the glenoid. There was major scarring to the area due to chronic nature of the condition encased in significant scar.  Complexity of the service was much greater than the normative procedure. There was increased time, intensity and technical difficulty of the procedure, severity of the patient's condition and mental effort required. This was a highly complicated repair and tear pattern that required advanced surgical arthroplasty skill in order to safely and technically perform it.  Nevertheless the repair achieved was excellent.     IMPLANTS: DJO CS Edge TSA system (canal sparing), size 1 18mm, 38mm all poly pegged glenoid E-plus, neutral diameter head 40 x 14 mm .     BRIEF INDICATION OF MEDICAL NECESSITY: The patient is aAGE@ year-old female with a   history and physical examination findings consistent with the above. Risks and   benefits of the procedure were discussed in detail. Surgical procedure  was   discussed in detail as well. The patient understood and wished to proceed.   Nonoperative versus operative options was discussed. Probable postoperative   course was explained as well as potential complications, including but not   limited to infection, fracture, stiffness, anesthesia risk, blood clots,   bleeding (DVT and/or PE), dislocation, instability, need for further surgery,   continued pain, decreased function. The patient agreed and understood and   wished to proceed.      PROCEDURE IN DETAIL: The patient was brought in the Operating Room and   preoperative antibiotics were administered and placed supine on the Operating   Room by the Anesthesia Team. The patient was then placed in the beach chair   position on the operating room table with the nonoperative upper extremity   placed in a comfortable well-padded position. Care was taken to all limbs were   comfortable and well padded positions. The operative upper extremity was then   prepped and draped in the usual sterile fashion. Lower extremity was also   prepped and draped in comfortable position and rechecked.     Suprascapular nerve block was performed in the standard fashion with 5cc local anesthetic.      Deltopectoral incision was marked and incision was made after the prepping and draping of the upper left extremity. Dissection was carried down through to the deltopectoral interval. Cephalic vein was identified and retracted. All venous bleeders were ligated. The pectoral fascia was incised and dissected bluntly using finger dissection and adhesion of the deltoid were also freed. Rotator interval and bicipital groove were identified and subscapularis was divided proximally 1 cm from the lateral insertion onto the humerus. This left a nice cuff of tissue for repair for closure later.      Stay sutures were placed in the superior and subscapularis and dissection of the subscapularis proceeded. At the custodial martina, the axillary nerve was  identified in front of the subscapularis inferiorly and a retractor was placed then was gently retracted. A Woody elevator was then used to free the inferior neck of the proximal humerus. Proximal to the axillary nerve, subscapularis division was completed inferiorly. Another fiber-wire suture was placed at the inferior edge of the subscapularis.      COMPLEX PROCEDURE:  There was an altered surgical field. There was abnormal anatomy with significant preferential reaming and contouring due to the bony deformity of the glenoid. There was major scarring to the area due to chronic nature of the condition encased in significant scar.  Complexity of the service was much greater than the normative procedure. There was increased time, intensity and technical difficulty of the procedure, severity of the patient's condition and mental effort required. This was a highly complicated repair and tear pattern that required advanced surgical arthroplasty skill in order to safely and technically perform it.  Nevertheless the repair achieved was excellent.     Shoulder lysis of adhesions (CPT 30334):  An extensive lysis of adhesions needed to be performed with lysis of adhesions in the lateral gutter, posterior gutter, and anterior gutter where there was extensive scarring from the chronic nature of the condition.  After the lysis of adhesions and osteophyte removal, the mobility was restored to the shoulder.     The humerus was then rotated at 30 degrees of external rotation. A retractor was placed posteriorly, humeral cuts were made in the standard fashion in 30 degrees of retroversion with the top part of the cut in line with the insertion where the rotator cuff insertion keeping it intact.  Humerus was drilled and then cut was made with jigs in the standard fashion, in 30 degrees of retroversion. Trial humeral stem was placed. Protective plate was placed.     A retractor was placed posteriorly for retracting the proximal humerus  and two prong retractors was placed anteriorly, retracting the subscapularis from the anterior glenoid. Labrum was excised sharply and glenoid was marked.  Reaming was performed to not violate subchondral bone surface and pulse-a-vaced. Glenoid holes were then drilled after the guide was placed. Trial glenoid was then placed and had good position and fixation. Glenoid implant was prepped with cement on the back table in the standard fashion. Glenoid was was reduced and impacted with impactor into place and held until cured. Excess cement was removed with curette.       Next, attention was then turned back to the humerus. Trial placed and reduced and found to be adequate in tension, not over-tensioned, stable, with good translation anterior and posterior, in IR and ER.     Head was placed and was found to be adequate in tension, not over-tensioned, stable, with good translation anterior and posterior, in IR and ER.     Gentle and copious irrigation was applied prior to implant placement for glenoid and humerus, was irrigated with Pulsavac     Soft tissue biceps tenodesis was performed with 2 figure of 8 sutures as the infra-pect area.     Gentle and copious irrigation was applied again.  1g Vancomycin powder was placed in to joint.  Subscapularis was closed using a #2 Fiberwire suture in a mattress fashion. This was done with 30 degrees of external rotation of subscapularis closure, closed nicely with good quality of repair. Biceps tenodesis was performed to the soft tissue with 0 Vicryl. The deltoid was closed using a running Vicryl suture and the deep dermal layer was closing with 0 Vicryl suture in interrupted fashion. The subcutaneous layer was closed using a 3-0 Vicryl suture and the skin was closed using 4-0 Monocryl suture.      Suprascapular nerve block was performed in the standard fashion with 5cc local anesthetic. Local was placed about portals and incision after irrigation, as described above, was  completed.     TENS unit pads were placed, which were medically necessary for pain relief. Incision was dressed with Xeroform, 4 x 4's, ABD pads and Medipore tape.  An Iceman was secured in the shoulder alcantar. A shoulder alcantar was secured.  The patient was then moved to supine, extubated and taken to the recovery room where the patient arrived in stable condition with the compartments of the arm and forearm soft and cap refill less than a second in all digits.     POSTOPERATIVE PLAN: We will follow the shoulder arthroplasty guidelines. The patient remained in a sling for 6 weeks. We will start PT at the 3-week martina.

## 2023-07-18 NOTE — PLAN OF CARE
Problem: Adult Inpatient Plan of Care  Goal: Plan of Care Review  Outcome: Ongoing, Progressing  Flowsheets (Taken 7/18/2023 1452)  Plan of Care Reviewed With:   patient   caregiver   father   friend     Problem: Bleeding (Shoulder Arthroplasty)  Goal: Absence of Bleeding  Intervention: Monitor and Manage Bleeding  Flowsheets (Taken 7/18/2023 1452)  Bleeding Management:   affected area elevated   direct pressure applied   dressing monitored     Problem: Neurovascular Compromise (Shoulder Arthroplasty)  Goal: Intact Neurovascular Status  Intervention: Prevent or Manage Neurovascular Compromise  Flowsheets (Taken 7/18/2023 1452)  Neurovascular Pressure Management: Cast: ice/cold therapy performed     Problem: Pain (Shoulder Arthroplasty)  Goal: Acceptable Pain Control  Intervention: Prevent or Manage Pain  Flowsheets (Taken 7/18/2023 1452)  Pain Management Interventions:   pain management plan reviewed with patient/caregiver   pillow support provided   position adjusted   premedicated for activity   quiet environment facilitated     Problem: Behavioral Health Comorbidity  Goal: Maintenance of Behavioral Health Symptom Control  Intervention: Maintain Behavioral Health Symptom Control  Flowsheets (Taken 7/18/2023 1452)  Medication Review/Management: other (see comments)   Plan of care reviewed with patient; verbalized understanding.   Medications reviewed and administered as ordered.  Rounding for safety and patient care per policy.   Safety precautions maintained. Patient working appropriately with PT/OT.  DME at bedside.  Call light within reach, bed wheels locked, bed in lowest position, side rails ^x2, safety maintained. NADN, Will continue monitor.

## 2023-07-18 NOTE — BRIEF OP NOTE
[facility] ([facility])  Brief Operative Note    SUMMARY     Surgery Date: 7/18/2023     Surgeon(s) and Role:     * Sharon Babb MD - Primary    Assisting Surgeon: Dong Diaz MD, PGY6    Pre-op Diagnosis:  Biceps tendonitis on left [M75.22]  Avascular necrosis of bone of shoulder [M87.019]  Chronic left shoulder pain [M25.512, G89.29]    Post-op Diagnosis:  Post-Op Diagnosis Codes:     * Biceps tendonitis on left [M75.22]     * Avascular necrosis of bone of shoulder [M87.019]     * Chronic left shoulder pain [M25.512, G89.29]    Procedure(s) (LRB):  ARTHROPLASTY, SHOULDER (Left)    Anesthesia: General    Operative Findings: Left shoulder arthroplasty.    Estimated Blood Loss: * No values recorded between 7/18/2023  7:54 AM and 7/18/2023 10:26 AM *    Estimated Blood Loss has been documented.         Specimens:   Specimen (24h ago, onward)      None            [id number]    Patient in stable condition and wished to be admitted due to uncontrolled post-op pain.

## 2023-07-18 NOTE — OPERATIVE NOTE ADDENDUM
Certification of Assistant at Surgery       Surgery Date: 7/18/2023     Participating Surgeons:  Surgeon(s) and Role:     * Sharon Babb MD - Primary    Procedures:  Procedure(s) (LRB):  ARTHROPLASTY, SHOULDER (Left)    Assistant Surgeon's Certification of Necessity:  I understand that section 1842 (b) (6) (d) of the Social Security Act generally prohibits Medicare Part B reasonable charge payment for the services of assistants at surgery in teaching hospitals when qualified residents are available to furnish such services. I certify that the services for which payment is claimed were medically necessary, and that no qualified resident was available to perform the services. I further understand that these services are subject to post-payment review by the Medicare carrier.      Sharon Babb MD    07/18/2023  10:03 AM

## 2023-07-18 NOTE — ANESTHESIA PROCEDURE NOTES
Intubation    Date/Time: 7/18/2023 7:14 AM  Performed by: Jeannette Orozco CRNA  Authorized by: Duncan Clark MD     Intubation:     Induction:  Intravenous    Intubated:  Postinduction    Mask Ventilation:  Easy mask    Attempts:  1    Attempted By:  CRNA (Jaylyn)    Method of Intubation:  Video laryngoscopy    Blade:  Dunbar 3    Laryngeal View Grade: Grade I - full view of cords      Difficult Airway Encountered?: No      Complications:  None    Airway Device:  Oral endotracheal tube    Airway Device Size:  7.0    Style/Cuff Inflation:  Cuffed    Inflation Amount (mL):  4    Tube secured:  20    Secured at:  The lips    Placement Verified By:  Capnometry    Complicating Factors:  None    Findings Post-Intubation:  BS equal bilateral and atraumatic/condition of teeth unchanged

## 2023-07-19 VITALS
TEMPERATURE: 98 F | BODY MASS INDEX: 20.2 KG/M2 | HEART RATE: 61 BPM | DIASTOLIC BLOOD PRESSURE: 60 MMHG | OXYGEN SATURATION: 97 % | HEIGHT: 61 IN | SYSTOLIC BLOOD PRESSURE: 119 MMHG | WEIGHT: 107 LBS | RESPIRATION RATE: 16 BRPM

## 2023-07-19 PROCEDURE — 63600175 PHARM REV CODE 636 W HCPCS

## 2023-07-19 PROCEDURE — 63600175 PHARM REV CODE 636 W HCPCS: Performed by: PHYSICIAN ASSISTANT

## 2023-07-19 PROCEDURE — 25000003 PHARM REV CODE 250: Performed by: PHYSICIAN ASSISTANT

## 2023-07-19 PROCEDURE — 25000003 PHARM REV CODE 250: Performed by: STUDENT IN AN ORGANIZED HEALTH CARE EDUCATION/TRAINING PROGRAM

## 2023-07-19 RX ORDER — KETOROLAC TROMETHAMINE 10 MG/1
10 TABLET, FILM COATED ORAL EVERY 8 HOURS PRN
Qty: 12 TABLET | Refills: 0 | Status: SHIPPED | OUTPATIENT
Start: 2023-07-19 | End: 2023-08-07 | Stop reason: SDUPTHER

## 2023-07-19 RX ORDER — PANTOPRAZOLE SODIUM 40 MG/1
40 TABLET, DELAYED RELEASE ORAL 2 TIMES DAILY
Status: CANCELLED | OUTPATIENT
Start: 2023-07-19

## 2023-07-19 RX ORDER — KETOROLAC TROMETHAMINE 10 MG/1
10 TABLET, FILM COATED ORAL EVERY 6 HOURS PRN
Status: DISCONTINUED | OUTPATIENT
Start: 2023-07-19 | End: 2023-07-19 | Stop reason: HOSPADM

## 2023-07-19 RX ADMIN — TRAMADOL HYDROCHLORIDE 100 MG: 50 TABLET, COATED ORAL at 07:07

## 2023-07-19 RX ADMIN — OXYCODONE HYDROCHLORIDE 10 MG: 10 TABLET ORAL at 09:07

## 2023-07-19 RX ADMIN — MORPHINE SULFATE 2 MG: 2 INJECTION, SOLUTION INTRAMUSCULAR; INTRAVENOUS at 12:07

## 2023-07-19 RX ADMIN — DRONABINOL 5 MG: 2.5 CAPSULE ORAL at 06:07

## 2023-07-19 RX ADMIN — PROMETHAZINE HYDROCHLORIDE 25 MG: 25 TABLET ORAL at 08:07

## 2023-07-19 RX ADMIN — MORPHINE SULFATE 2 MG: 2 INJECTION, SOLUTION INTRAMUSCULAR; INTRAVENOUS at 06:07

## 2023-07-19 RX ADMIN — OXYCODONE HYDROCHLORIDE 10 MG: 10 TABLET ORAL at 05:07

## 2023-07-19 RX ADMIN — KETOROLAC TROMETHAMINE 10 MG: 10 TABLET, FILM COATED ORAL at 08:07

## 2023-07-19 NOTE — NURSING
4890-  secure chat sent to Dr. David regarding pt BP 92/54 and c/o pain 5/10.  Pt asymptomatic.  Awaiting reply.    9201-  secure chat read by Dr. David.     3034-  call placed to Dr. David to see if ordered PRN Oxycodone 10mg should be given at this time for pain and MD states to wait until pt is rounded on this am.    2887-  Charge nurse YORDAN Benitez RN spoke with YORDAN Escalante NP on-site regarding pt c/o pain.  This nurse re-assessed BP and is now 135/82.  Pt to receive PRN Oxycodone 10 mg.

## 2023-07-19 NOTE — PLAN OF CARE
Problem: Adult Inpatient Plan of Care  Goal: Plan of Care Review  Flowsheets (Taken 7/19/2023 0839)  Plan of Care Reviewed With:   patient   friend     Problem: Adult Inpatient Plan of Care  Goal: Patient-Specific Goal (Individualized)  Flowsheets (Taken 7/19/2023 0839)  Individualized Care Needs: pain management     Problem: Bleeding (Shoulder Arthroplasty)  Goal: Absence of Bleeding  Intervention: Monitor and Manage Bleeding  Flowsheets (Taken 7/19/2023 0839)  Bleeding Management: (arm in sling)   affected area elevated   dressing monitored   movement restricted   other (see comments)     Problem: Pain (Shoulder Arthroplasty)  Goal: Acceptable Pain Control  Intervention: Prevent or Manage Pain  Flowsheets (Taken 7/19/2023 0839)  Pain Management Interventions:   breathing exercises utilized   care clustered   cold applied   medication offered   pain management plan reviewed with patient/caregiver   pillow support provided   position adjusted   quiet environment facilitated     Problem: Postoperative Nausea and Vomiting (Shoulder Arthroplasty)  Goal: Nausea and Vomiting Relief  Intervention: Prevent or Manage Nausea and Vomiting  Flowsheets (Taken 7/19/2023 0839)  Nausea/Vomiting Interventions: (medicate for nausea with prn meds.)   nausea triggers minimized   other (see comments)     Problem: Behavioral Health Comorbidity  Goal: Maintenance of Behavioral Health Symptom Control  Intervention: Maintain Behavioral Health Symptom Control  Flowsheets (Taken 7/19/2023 0839)  Medication Review/Management: medications reviewed   Plan of care reviewed with patient; verbalized understanding.   Medications reviewed and administered as ordered.  Rounding for safety and patient care per policy.   Safety precautions maintained. Patient working appropriately with PT/OT.  DME at bedside.  Call light within reach, bed wheels locked, bed in lowest position, side rails ^x2, safety maintained. NADN, adequate for discharge.

## 2023-07-19 NOTE — NURSING
Has met unit/department guidelines for discharge from each phase of the post procedure continuum. Patient discharged.  Instructions and Prescriptions given.  IV removed, tolerated well.  Assisted pt with safely dressing in to home clothes. Home meds delivered to bedside per pharmacy. Polar care delivered to pt bedside. Patient verbalized understanding instructions.  AAOx3, VSS, NADN, no complaints noted at this time.  Wheelchair to private vehicle in care of friend to home.

## 2023-07-19 NOTE — PLAN OF CARE
Problem: Adult Inpatient Plan of Care  Goal: Absence of Hospital-Acquired Illness or Injury  Outcome: Ongoing, Progressing  Intervention: Identify and Manage Fall Risk  Flowsheets (Taken 7/19/2023 0240)  Safety Promotion/Fall Prevention:   assistive device/personal item within reach   Fall Risk reviewed with patient/family   side rails raised x 2   instructed to call staff for mobility   nonskid shoes/socks when out of bed   medications reviewed  Intervention: Prevent and Manage VTE (Venous Thromboembolism) Risk  Flowsheets (Taken 7/19/2023 0240)  Activity Management: Ambulated to bathroom - L4  VTE Prevention/Management:   remove, assess skin, and reapply sequential compression device   dorsiflexion/plantar flexion performed   fluids promoted  Range of Motion: active ROM (range of motion) encouraged  Intervention: Prevent Infection  Flowsheets (Taken 7/19/2023 0240)  Infection Prevention:   hand hygiene promoted   single patient room provided   rest/sleep promoted

## 2023-07-19 NOTE — ANESTHESIA POSTPROCEDURE EVALUATION
Anesthesia Post Evaluation    Patient: Ivy Salgado    Procedure(s) Performed: Procedure(s) (LRB):  ARTHROPLASTY, SHOULDER (Left)    Final Anesthesia Type: general      Patient location during evaluation: PACU  Patient participation: Yes- Able to Participate  Level of consciousness: awake and alert and oriented  Post-procedure vital signs: reviewed and stable  Pain management: adequate  Airway patency: patent    PONV status at discharge: No PONV  Anesthetic complications: no      Cardiovascular status: blood pressure returned to baseline and hemodynamically stable  Respiratory status: unassisted, room air and spontaneous ventilation  Hydration status: euvolemic  Follow-up not needed.          Vitals Value Taken Time   /60 07/19/23 1023   Temp 36.6 °C (97.9 °F) 07/19/23 1023   Pulse 61 07/19/23 1023   Resp 16 07/19/23 1023   SpO2 97 % 07/19/23 1023         Event Time   Out of Recovery 13:30:00         Pain/Michael Score: Pain Rating Prior to Med Admin: 5 (7/19/2023  9:40 AM)  Pain Rating Post Med Admin: 6 (7/19/2023  7:06 AM)  Michael Score: 9 (7/18/2023 12:45 PM)

## 2023-07-19 NOTE — DISCHARGE SUMMARY
"[facility]  Discharge Note  Short Stay    Procedure(s) (LRB):  ARTHROPLASTY, SHOULDER (Left)      OUTCOME: Patient tolerated treatment/procedure well without complication and is now ready for discharge.    DISPOSITION: Home or Self Care    FINAL DIAGNOSIS:  Avascular necrosis of bone of shoulder    FOLLOWUP: In clinic    DISCHARGE INSTRUCTIONS:    Discharge Procedure Orders   SLING FOR HOME USE   Order Comments: Post-op sling     Order Specific Question Answer Comments   Height: 5' 1" (1.549 m)    Weight: 48.5 kg (107 lb 0.5 oz)    Does patient have medical equipment at home? none    Length of need (1-99 months): 12      Diet general     Call MD for:  temperature >100.4     Call MD for:  persistent nausea and vomiting     Call MD for:  severe uncontrolled pain     Call MD for:  difficulty breathing, headache or visual disturbances     Call MD for:  redness, tenderness, or signs of infection (pain, swelling, redness, odor or green/yellow discharge around incision site)     Call MD for:  hives     Call MD for:  persistent dizziness or light-headedness     No driving, operating heavy equipment or signing legal documents while taking pain medication     Shower on day dressing removed (No bath)        TIME SPENT ON DISCHARGE: 20 minutes  "

## 2023-07-19 NOTE — NURSING
Pt c/o 6/10 pain. Ordered prn meds not yet due.   Called Dr. Diaz DO. Spoke to Dr. Diaz.   MD approved admin of available Tramadol 100 prn.   Vs taken, stable. Med given.   Doctor to bedside to discuss with pt and friend at bedside.

## 2023-07-20 ENCOUNTER — PATIENT MESSAGE (OUTPATIENT)
Dept: SPORTS MEDICINE | Facility: CLINIC | Age: 43
End: 2023-07-20
Payer: COMMERCIAL

## 2023-07-20 NOTE — NURSING
"2205-  [facility]  called to page on-call regarding frequency of PRN morphine order.  Awaiting return call.    2221-  [facility]  called to page on-call again.  Awaiting return call.    2242-  Charge nurse YORDAN Benitez RN sent secure chat to Dr. David inquiring if he was on-call for Dr. Babb to which he replied that he was.    2251-  This nurse placed a call directly to Dr. David regarding frequency of PRN morphine and also to let him know that the pt had received Oxycodone 10 mg for pain at 2027 and Morphine 2 mg at 2134.  The pt was scheduled to receive Ambien 10 mg but was already asleep before it could be given when this nurse made rounds and Dr. David stated, "If the pt wakes up asking for it, give me a call and we'll go from there."  New orders received and noted.  "

## 2023-07-21 ENCOUNTER — PATIENT MESSAGE (OUTPATIENT)
Dept: SPORTS MEDICINE | Facility: CLINIC | Age: 43
End: 2023-07-21
Payer: COMMERCIAL

## 2023-07-21 ENCOUNTER — TELEPHONE (OUTPATIENT)
Dept: SPORTS MEDICINE | Facility: CLINIC | Age: 43
End: 2023-07-21
Payer: COMMERCIAL

## 2023-07-21 RX ORDER — OXYCODONE HYDROCHLORIDE 10 MG/1
10 TABLET ORAL
Qty: 20 TABLET | Refills: 0 | Status: SHIPPED | OUTPATIENT
Start: 2023-07-21 | End: 2023-08-13 | Stop reason: SDUPTHER

## 2023-07-31 ENCOUNTER — HOSPITAL ENCOUNTER (OUTPATIENT)
Dept: RADIOLOGY | Facility: HOSPITAL | Age: 43
Discharge: HOME | End: 2023-07-31
Attending: PHYSICIAN ASSISTANT
Payer: COMMERCIAL

## 2023-07-31 ENCOUNTER — OFFICE VISIT (OUTPATIENT)
Dept: SPORTS MEDICINE | Facility: CLINIC | Age: 43
End: 2023-07-31
Payer: COMMERCIAL

## 2023-07-31 VITALS
DIASTOLIC BLOOD PRESSURE: 73 MMHG | HEART RATE: 63 BPM | SYSTOLIC BLOOD PRESSURE: 111 MMHG | WEIGHT: 114 LBS | BODY MASS INDEX: 21.52 KG/M2 | HEIGHT: 61 IN

## 2023-07-31 DIAGNOSIS — T38.0X5A AVASCULAR NECROSIS OF LEFT SHOULDER DUE TO ADVERSE EFFECT OF STEROID THERAPY: ICD-10-CM

## 2023-07-31 DIAGNOSIS — G89.29 CHRONIC LEFT SHOULDER PAIN: Primary | ICD-10-CM

## 2023-07-31 DIAGNOSIS — M87.019 AVASCULAR NECROSIS OF BONE OF SHOULDER: ICD-10-CM

## 2023-07-31 DIAGNOSIS — M87.112 AVASCULAR NECROSIS OF LEFT SHOULDER DUE TO ADVERSE EFFECT OF STEROID THERAPY: ICD-10-CM

## 2023-07-31 DIAGNOSIS — G89.29 CHRONIC LEFT SHOULDER PAIN: ICD-10-CM

## 2023-07-31 DIAGNOSIS — M25.512 CHRONIC LEFT SHOULDER PAIN: Primary | ICD-10-CM

## 2023-07-31 DIAGNOSIS — M25.512 CHRONIC LEFT SHOULDER PAIN: ICD-10-CM

## 2023-07-31 DIAGNOSIS — F41.1 GENERALIZED ANXIETY DISORDER: ICD-10-CM

## 2023-07-31 DIAGNOSIS — Z98.890 S/P SHOULDER SURGERY: ICD-10-CM

## 2023-07-31 DIAGNOSIS — M25.512 LEFT SHOULDER PAIN, UNSPECIFIED CHRONICITY: ICD-10-CM

## 2023-07-31 PROCEDURE — 99024 PR POST-OP FOLLOW-UP VISIT: ICD-10-PCS | Mod: S$GLB,,, | Performed by: PHYSICIAN ASSISTANT

## 2023-07-31 PROCEDURE — 73030 XR SHOULDER COMPLETE 2 OR MORE VIEWS LEFT: ICD-10-PCS | Mod: 26,LT,, | Performed by: RADIOLOGY

## 2023-07-31 PROCEDURE — 3074F SYST BP LT 130 MM HG: CPT | Mod: CPTII,S$GLB,, | Performed by: PHYSICIAN ASSISTANT

## 2023-07-31 PROCEDURE — 99999 PR PBB SHADOW E&M-EST. PATIENT-LVL V: CPT | Mod: PBBFAC,,, | Performed by: PHYSICIAN ASSISTANT

## 2023-07-31 PROCEDURE — 1160F PR REVIEW ALL MEDS BY PRESCRIBER/CLIN PHARMACIST DOCUMENTED: ICD-10-PCS | Mod: CPTII,S$GLB,, | Performed by: PHYSICIAN ASSISTANT

## 2023-07-31 PROCEDURE — 3008F BODY MASS INDEX DOCD: CPT | Mod: CPTII,S$GLB,, | Performed by: PHYSICIAN ASSISTANT

## 2023-07-31 PROCEDURE — 99024 POSTOP FOLLOW-UP VISIT: CPT | Mod: S$GLB,,, | Performed by: PHYSICIAN ASSISTANT

## 2023-07-31 PROCEDURE — 3074F PR MOST RECENT SYSTOLIC BLOOD PRESSURE < 130 MM HG: ICD-10-PCS | Mod: CPTII,S$GLB,, | Performed by: PHYSICIAN ASSISTANT

## 2023-07-31 PROCEDURE — 3008F PR BODY MASS INDEX (BMI) DOCUMENTED: ICD-10-PCS | Mod: CPTII,S$GLB,, | Performed by: PHYSICIAN ASSISTANT

## 2023-07-31 PROCEDURE — 1160F RVW MEDS BY RX/DR IN RCRD: CPT | Mod: CPTII,S$GLB,, | Performed by: PHYSICIAN ASSISTANT

## 2023-07-31 PROCEDURE — 1159F MED LIST DOCD IN RCRD: CPT | Mod: CPTII,S$GLB,, | Performed by: PHYSICIAN ASSISTANT

## 2023-07-31 PROCEDURE — 73030 X-RAY EXAM OF SHOULDER: CPT | Mod: TC,LT

## 2023-07-31 PROCEDURE — 3078F DIAST BP <80 MM HG: CPT | Mod: CPTII,S$GLB,, | Performed by: PHYSICIAN ASSISTANT

## 2023-07-31 PROCEDURE — 99999 PR PBB SHADOW E&M-EST. PATIENT-LVL V: ICD-10-PCS | Mod: PBBFAC,,, | Performed by: PHYSICIAN ASSISTANT

## 2023-07-31 PROCEDURE — 73030 X-RAY EXAM OF SHOULDER: CPT | Mod: 26,LT,, | Performed by: RADIOLOGY

## 2023-07-31 PROCEDURE — 1159F PR MEDICATION LIST DOCUMENTED IN MEDICAL RECORD: ICD-10-PCS | Mod: CPTII,S$GLB,, | Performed by: PHYSICIAN ASSISTANT

## 2023-07-31 PROCEDURE — 3078F PR MOST RECENT DIASTOLIC BLOOD PRESSURE < 80 MM HG: ICD-10-PCS | Mod: CPTII,S$GLB,, | Performed by: PHYSICIAN ASSISTANT

## 2023-07-31 RX ORDER — OXYCODONE HYDROCHLORIDE 10 MG/1
10 TABLET ORAL
Qty: 21 TABLET | Refills: 0 | Status: SHIPPED | OUTPATIENT
Start: 2023-07-31 | End: 2023-08-07 | Stop reason: SDUPTHER

## 2023-07-31 RX ORDER — CLONAZEPAM 1 MG/1
TABLET ORAL
Qty: 75 TABLET | Refills: 0 | Status: SHIPPED | OUTPATIENT
Start: 2023-07-31 | End: 2023-08-27 | Stop reason: SDUPTHER

## 2023-07-31 RX ORDER — CYCLOBENZAPRINE HCL 10 MG
10 TABLET ORAL EVERY 8 HOURS PRN
Qty: 30 TABLET | Refills: 0 | Status: SHIPPED | OUTPATIENT
Start: 2023-07-31 | End: 2023-08-10

## 2023-07-31 NOTE — TELEPHONE ENCOUNTER
No care due was identified.  [facility] Embedded Care Due Messages. Reference number: [id number].   7/31/2023 9:51:20 AM CDT

## 2023-07-31 NOTE — TELEPHONE ENCOUNTER
Refill Encounter    PCP Visits: Recent Visits  Date Type Provider Dept   06/30/23 Office Visit Kalia Astorga MD [facility] Internal Medicine   Showing recent visits within past 360 days and meeting all other requirements  Future Appointments  No visits were found meeting these conditions.  Showing future appointments within next 720 days and meeting all other requirements     Last 3 Blood Pressure:   BP Readings from Last 3 Encounters:   07/19/23 119/60   07/10/23 (!) 141/76   07/10/23 123/87     Preferred Pharmacy:   [id number] - [place]  [address] AT [facility] [address]  [address] [place] [address]  Phone: [phone] Fax: [phone]    [facility]  [address] [address]  Phone: [phone] Fax: [phone]    [facility]  [address]  [place] [address]  Phone: [phone] Fax: [phone]    [id number] - [place] - [address] [facility] [address] [facility] AT [facility] & [address]  [place] [address]  Phone: [phone] Fax: [phone]    Requested RX:  Requested Prescriptions     Pending Prescriptions Disp Refills    clonazePAM (KLONOPIN) 1 MG tablet 75 tablet 0     Sig: TAKE 1 AND 1/2 TABLETS BY MOUTH TWICE DAILY AS NEEDED FOR ANXIETY      RX Route: Normal

## 2023-08-01 RX ORDER — TRAMADOL HYDROCHLORIDE 50 MG/1
50-100 TABLET ORAL EVERY 6 HOURS PRN
Qty: 21 TABLET | Refills: 0 | OUTPATIENT
Start: 2023-08-01

## 2023-08-07 ENCOUNTER — HOSPITAL ENCOUNTER (EMERGENCY)
Facility: HOSPITAL | Age: 43
Discharge: HOME | End: 2023-08-07
Attending: EMERGENCY MEDICINE
Payer: COMMERCIAL

## 2023-08-07 VITALS
DIASTOLIC BLOOD PRESSURE: 64 MMHG | SYSTOLIC BLOOD PRESSURE: 113 MMHG | RESPIRATION RATE: 20 BRPM | WEIGHT: 115 LBS | HEIGHT: 61 IN | OXYGEN SATURATION: 98 % | TEMPERATURE: 98 F | BODY MASS INDEX: 21.71 KG/M2 | HEART RATE: 67 BPM

## 2023-08-07 DIAGNOSIS — M87.019 AVASCULAR NECROSIS OF BONE OF SHOULDER: ICD-10-CM

## 2023-08-07 DIAGNOSIS — R21 RASH: ICD-10-CM

## 2023-08-07 DIAGNOSIS — M25.519 SHOULDER PAIN, UNSPECIFIED CHRONICITY, UNSPECIFIED LATERALITY: Primary | ICD-10-CM

## 2023-08-07 DIAGNOSIS — M25.512 LEFT SHOULDER PAIN, UNSPECIFIED CHRONICITY: ICD-10-CM
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
## 2023-08-07 PROCEDURE — 63600175 PHARM REV CODE 636 W HCPCS: Performed by: PHYSICIAN ASSISTANT

## 2023-08-07 PROCEDURE — 96375 TX/PRO/DX INJ NEW DRUG ADDON: CPT

## 2023-08-07 PROCEDURE — 99284 EMERGENCY DEPT VISIT MOD MDM: CPT

## 2023-08-07 PROCEDURE — 96374 THER/PROPH/DIAG INJ IV PUSH: CPT

## 2023-08-07 PROCEDURE — 25000003 PHARM REV CODE 250: Performed by: PHYSICIAN ASSISTANT

## 2023-08-07 RX ORDER — KETOROLAC TROMETHAMINE 10 MG/1
10 TABLET, FILM COATED ORAL EVERY 6 HOURS PRN
Qty: 20 TABLET | Refills: 0 | Status: SHIPPED | OUTPATIENT
Start: 2023-08-07 | End: 2023-08-30

## 2023-08-07 RX ORDER — KETOROLAC TROMETHAMINE 30 MG/ML
10 INJECTION, SOLUTION INTRAMUSCULAR; INTRAVENOUS
Status: COMPLETED | OUTPATIENT
Start: 2023-08-07 | End: 2023-08-07

## 2023-08-07 RX ORDER — HYDROMORPHONE HYDROCHLORIDE 1 MG/ML
1 INJECTION, SOLUTION INTRAMUSCULAR; INTRAVENOUS; SUBCUTANEOUS
Status: COMPLETED | OUTPATIENT
Start: 2023-08-07 | End: 2023-08-07

## 2023-08-07 RX ORDER — OXYCODONE HYDROCHLORIDE 10 MG/1
10 TABLET ORAL EVERY 4 HOURS PRN
Qty: 18 TABLET | Refills: 0 | Status: SHIPPED | OUTPATIENT
Start: 2023-08-07 | End: 2023-08-10

## 2023-08-07 RX ORDER — OXYCODONE AND ACETAMINOPHEN 5; 325 MG/1; MG/1
2 TABLET ORAL
Status: COMPLETED | OUTPATIENT
Start: 2023-08-07 | End: 2023-08-07

## 2023-08-07 RX ADMIN — OXYCODONE HYDROCHLORIDE AND ACETAMINOPHEN 2 TABLET: 5; 325 TABLET ORAL at 08:08

## 2023-08-07 RX ADMIN — HYDROMORPHONE HYDROCHLORIDE 1 MG: 1 INJECTION, SOLUTION INTRAMUSCULAR; INTRAVENOUS; SUBCUTANEOUS at 06:08

## 2023-08-07 RX ADMIN — KETOROLAC TROMETHAMINE 10 MG: 30 INJECTION INTRAMUSCULAR; INTRAVENOUS at 08:08

## 2023-08-07 NOTE — FIRST PROVIDER EVALUATION
Emergency Department TeleTriage Encounter Note      CHIEF COMPLAINT    Chief Complaint   Patient presents with    Shoulder Injury     L shoulder deformity; had shoulder replacement on L side about 2 weeks ago and fell today tripped over dogs and wasn't wearing sling        VITAL SIGNS   Initial Vitals [08/07/23 1702]   BP Pulse Resp Temp SpO2   120/83 76 16 98.2 °F (36.8 °C) 96 %      MAP       --            ALLERGIES    Review of patient's allergies indicates:   Allergen Reactions    Azathioprine sodium Other (See Comments)     Other reaction(s): pancreatitis  Other reaction(s): pancreatitis    Methotrexate analogues Other (See Comments)     leukopenia    Stelara [ustekinumab] Other (See Comments)     Multiple infections    Zofran [ondansetron hcl (pf)] Other (See Comments)     Per patient causes prolong QT    Vancomycin analogues Other (See Comments)     Made her red    Azathioprine      Other reaction(s): Unknown    Methotrexate      Other reaction(s): infection-    Morphine Itching and Other (See Comments)     Other reaction(s): Itching    Zofran [ondansetron hcl]      Other reaction(s): Hives    Bactrim [sulfamethoxazole-trimethoprim] Palpitations    Ciprofloxacin Palpitations       PROVIDER TRIAGE NOTE  43 year old female who had recent left shoulder surgery presents to the ER with complaints of sustaining a trip and fall landing on her left shoulder today just PTA. Felt it was dislocated and was able to manually reduce according to pt. Reports ongoing left shoulder pain since falling. Reports no head injury or other injured areas of bod y from this fall.    Exam: left shoulder is currently immobilized in shoulder sling, pt appears in no acute distress, aaox3, respirations even and non labored.      ORDERS  Labs Reviewed - No data to display    ED Orders (720h ago, onward)      None              Virtual Visit Note: The provider triage portion of this emergency department evaluation and documentation was  performed via [employer] book, a HIPAA-compliant telemedicine application, in concert with a tele-presenter in the room. A face to face patient evaluation with one of my colleagues will occur once the patient is placed in an emergency department room.      DISCLAIMER: This note was prepared with [employer] voice recognition transcription software. Garbled syntax, mangled pronouns, and other bizarre constructions may be attributed to that software system.

## 2023-08-07 NOTE — ED PROVIDER NOTES
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 Babb) presents for pain in her left shoulder after a fall.  She states that she tripped and fell over her dog, landing onto her left shoulder.  She has been taking oxycodone for pain at home but feels that her pain has been uncontrolled.  She reports decreased range of motion as well as a rash on the shoulder.  She denies head strike, LOC, numbness or other complaint.      Review of patient's allergies indicates:   Allergen Reactions    Azathioprine sodium Other (See Comments)     Other reaction(s): pancreatitis  Other reaction(s): pancreatitis    Methotrexate analogues Other (See Comments)     leukopenia    Stelara [ustekinumab] Other (See Comments)     Multiple infections    Zofran [ondansetron hcl (pf)] Other (See Comments)     Per patient causes prolong QT    Vancomycin analogues Other (See Comments)     Made her red    Azathioprine      Other reaction(s): Unknown    Methotrexate      Other reaction(s): infection-    Morphine Itching and Other (See Comments)     Other reaction(s): Itching    Zofran [ondansetron hcl]      Other reaction(s): Hives    Bactrim [sulfamethoxazole-trimethoprim] Palpitations    Ciprofloxacin Palpitations     Past Medical History:   Diagnosis Date    Abnormal Pap smear 2007    Abnormal Pap smear 5/26/2011    Anemia     Anxiety     Arthritis     C. difficile diarrhea     Crohn's disease     Depression 8/5/2017    Encounter for blood transfusion     Genital HSV     History of colposcopy with cervical biopsy 2007 and 7/2011 2007-LYLA I  and 7/2011- LYLA I    Hypertension     Kidney stone     Kidney  stone     Melanoma     Recurrent UTI 4/3/2013    S/P ileostomy 7/9/2012    Sterilization 6/23/2012     Past Surgical History:   Procedure Laterality Date    ABDOMINAL SURGERY      APPENDECTOMY      ARTHROPLASTY OF SHOULDER Left 7/18/2023    Procedure: ARTHROPLASTY, SHOULDER;  Surgeon: Sharon Babb MD;  Location: [facility];  Service: Orthopedics;  Laterality: Left;    AUGMENTATION OF BREAST Bilateral 06/2022    gel implants    BILATERAL SALPINGO-OOPHORECTOMY (BSO) Bilateral 05/30/2019    Procedure: SALPINGO-OOPHORECTOMY, BILATERAL;  Surgeon: Rupa German MD;  Location: [address];  Service: OB/GYN;  Laterality: Bilateral;    BLADDER SURGERY      partial cystectomy due to fistula    breast lift      BREAST SURGERY      [facility]      COLON SURGERY      COLONOSCOPY      CYSTOSCOPY  09/23/2020    Procedure: CYSTOSCOPY;  Surgeon: Sascha Florentino MD;  Location: [facility] OR [address] [place] [address];  Service: Urology;;    CYSTOSCOPY W/ URETERAL STENT PLACEMENT Left 09/15/2020    Procedure: CYSTOSCOPY, WITH URETERAL STENT INSERTION;  Surgeon: Sascha Florentino MD;  Location: [facility] OR [address] [place] [address];  Service: Urology;  Laterality: Left;    DIAGNOSTIC LAPAROSCOPY N/A 07/09/2020    Procedure: LAPAROSCOPY, DIAGNOSTIC;  Surgeon: Gilson El MD;  Location: [facility];  Service: OB/GYN;  Laterality: N/A;    EXCISION OF MELANOMA  07/17/2019    ILEOSTOMY      LAPAROSCOPIC LYSIS OF ADHESIONS N/A 07/09/2020    Procedure: LYSIS, ADHESIONS, LAPAROSCOPIC;  Surgeon: Gilson El MD;  Location: [facility];  Service: OB/GYN;  Laterality: N/A;    LASER LITHOTRIPSY  09/23/2020    Procedure: LITHOTRIPSY, USING LASER;  Surgeon: Sascha Florentino MD;  Location: [facility] OR [address] [place] [address];  Service: Urology;;    LYSIS OF ADHESIONS N/A 05/30/2019    Procedure: LYSIS, ADHESIONS;  Surgeon: Rupa German MD;  Location: [facility] OR [address] [place] [address];  Service: OB/GYN;  Laterality: N/A;    OOPHORECTOMY Right 04/16/2015    PORTACATH PLACEMENT  02/21/2017    SKIN BIOPSY      SMALL  INTESTINE SURGERY      age 18 Y    TOTAL ABDOMINAL HYSTERECTOMY  04/16/2015    TOTAL COLECTOMY      TUBAL LIGATION  06/06/2012    UPPER GASTROINTESTINAL ENDOSCOPY      URETEROSCOPIC REMOVAL OF URETERIC CALCULUS  09/23/2020    Procedure: REMOVAL, CALCULUS, URETER, URETEROSCOPIC;  Surgeon: Sascha Florentino MD;  Location: [facility] OR [address] [place] [address];  Service: Urology;;    URETEROSCOPY Left 09/23/2020    Procedure: URETEROSCOPY;  Surgeon: Sascha Florentino MD;  Location: [facility] OR [address] [place] [address];  Service: Urology;  Laterality: Left;     Family History   Problem Relation Age of Onset    Hypertension Mother     Colon cancer Father     Cancer Father         Colon    Liver cancer Father     Hyperlipidemia Father     Endometrial cancer Maternal Aunt     Cancer Maternal Grandfather         Skin    Skin cancer Maternal Grandfather     Diabetes Maternal Grandfather     Heart disease Maternal Grandfather     Hearing loss Paternal Grandmother     Diabetes Paternal Grandfather     Crohn's disease Brother     Breast cancer Maternal Cousin 41    Ovarian cancer Neg Hx     Melanoma Neg Hx      Social History     Tobacco Use    Smoking status: Never    Smokeless tobacco: Never   Substance Use Topics    Alcohol use: Not Currently     Alcohol/week: 0.0 standard drinks of alcohol    Drug use: No     Review of Systems    Physical Exam     Initial Vitals [08/07/23 1702]   BP Pulse Resp Temp SpO2   120/83 76 16 98.2 °F (36.8 °C) 96 %      MAP       --         Physical Exam    Nursing note and vitals reviewed.  Constitutional: She appears well-developed and well-nourished.   HENT:   Head: Normocephalic and atraumatic.   Cardiovascular:  Normal rate, regular rhythm, normal heart sounds and intact distal pulses.     Exam reveals no gallop and no friction rub.       No murmur heard.  Pulmonary/Chest: Breath sounds normal. No respiratory distress. She has no wheezes. She has no rhonchi. She has no rales. She exhibits no tenderness.   Musculoskeletal:       Comments: Significant tenderness to palpation of the left upper humerus.  Unable to abduct the arm.     Neurological: She is alert and oriented to person, place, and time.   Skin: Skin is warm and dry. Rash noted.   Few scattered erythematous papules on the posterior left shoulder.  No discharge or bleeding   Psychiatric: Her mood appears anxious.         ED Course   Procedures  Labs Reviewed - No data to display       Imaging Results              X-Ray Shoulder Trauma Left (Final result)  Result time 08/07/23 19:43:29      Final result by Kelby Solano MD (08/07/23 19:43:29)                   Impression:      1. Suboptimal evaluation of the shoulder given positioning, no definite dislocation however correlation is advised.      Electronically signed by: Kelby Solano MD  Date:    08/07/2023  Time:    19:43               Narrative:    EXAMINATION:  XR SHOULDER TRAUMA 3 VIEW LEFT    CLINICAL HISTORY:  Unspecified fall, initial encounter    TECHNIQUE:  Three views of the left shoulder were performed.    COMPARISON  07/31/2023    FINDINGS:  Four views left shoulder.    The left humeral head appears to maintain appropriate relationship with the glenoid noting positioning limits evaluation.  Left chest wall port catheter noted.                                       Medications   HYDROmorphone injection 1 mg (1 mg Intravenous Given 8/7/23 1837)   oxyCODONE-acetaminophen 5-325 mg per tablet 2 tablet (2 tablets Oral Given 8/7/23 2040)   ketorolac injection 9.999 mg (9.999 mg Intravenous Given 8/7/23 2040)     Medical Decision Making:   History:   Old Medical Records: I decided to obtain old medical records.  Old Records Summarized: records from previous admission(s) and records from clinic visits.       <> Summary of Records: Patient has been messaging her surgeon regarding rash and pain    DATE OF PROCEDURE: 07/18/2023     SURGEON: Sharon Babb M.D.     ASSISTANT: JOSEE Diaz MD PGY6  ASSISTANT: SMA Caroline        PREOPERATIVE DIAGNOSES:   left  1. Shoulder glenohumeral arthritis  2. Shoulder adhesions.   3. Shoulder biceps tendonitis     POSTOPERATIVE DIAGNOSES:   left  1. Shoulder glenohumeral arthritis  2. Shoulder adhesions.   3. Shoulder biceps tendonitis     PROCEDURE PERFORMED:   left  1. Total shoulder arthroplasty (complex, -22 modifier)  2. Shoulder lysis of adhesions  3. Shoulder subpectoral biceps tenodesis (CPT 95949)  Initial Assessment:   43-year-old female presenting for pain in her shoulder after a fall several weeks after shoulder arthroplasty.  Vitals are normal, she appears very uncomfortable but is neurovascularly intact  Differential Diagnosis:   Periprosthetic fracture  Shoulder dislocation  The rash appears to be very localized, suspect localized reaction but unclear to what   There are no clinical signs of infection on exam  Independently Interpreted Test(s):   I have ordered and independently interpreted X-rays - see summary below.       <> Summary of X-Ray Reading(s): No acute fracture or dislocation  Clinical Tests:   Radiological Study: Ordered and Reviewed  ED Management:  Will do x-rays, give analgesics and reassess.    X-ray without evidence of fracture or dislocation.  Given her severe pain and postop status, orthopedics consulted.  Patient seen and evaluated by Orthopedics, she does not require additional imaging but may require more pain control.  Will discharge with few extra doses Percocet and Toradol, follow up with her orthopedist and return to the ED for worsening symptoms. Stressed the importance of follow-up, strict ED return precautions given.  Patient voiced understanding and is comfortable with discharge. I discussed this patient with my supervising physician.    Other:   I have discussed this case with another health care provider.             ED Course as of 08/07/23 2090   Mon Aug 07, 2023   2015 Patient seen and evaluated by Orthopedics who recommend replaced in a sling, pain  control.  No further imaging indicated at this time. [CC]      ED Course User Index  [CC] Tiera Ch PA-C                 Clinical Impression:   Final diagnoses:  [W19.XXXA] Fall  [M25.519] Shoulder pain, unspecified chronicity, unspecified laterality (Primary)  [R21] Rash        ED Disposition Condition    Discharge Stable          ED Prescriptions       Medication Sig Dispense Start Date End Date Auth. Provider    ketorolac (TORADOL) 10 mg tablet Take 1 tablet (10 mg total) by mouth every 6 (six) hours as needed (breakthrough pain). Take with food. 20 tablet 8/7/2023 -- Tiera Ch PA-C    oxyCODONE (ROXICODONE) 10 mg Tab immediate release tablet Take 1 tablet (10 mg total) by mouth every 4 (four) hours as needed for Pain (Breakthrough pain not relieved by other medications). 18 tablet 8/7/2023 8/10/2023 Tiera Ch PA-C          Follow-up Information       Follow up With Specialties Details Why Contact Info    Mono Giles III, PA-C Sports Medicine Schedule an appointment as soon as possible for a visit in 1 week  [address]  [facility] [address]  [phone]      [facility] - Emergency Dept Emergency Medicine Go to  If symptoms worsen [address]  [facility] [id number] [phone]             Tiera Ch PA-C  08/07/23 1679

## 2023-08-07 NOTE — ED TRIAGE NOTES
Pt to ED via personal transport after mechanical trip and fall. Pt had L shoulder replacement 2 weeks ago, today walking dogs tripped + fell. Pt arrives in sling, deformity noted to L shoulder. 10/10 pain. Pt tearful, crying during check in.l AxOx4.

## 2023-08-08 ENCOUNTER — TELEPHONE (OUTPATIENT)
Dept: SPORTS MEDICINE | Facility: CLINIC | Age: 43
End: 2023-08-08
Payer: COMMERCIAL

## 2023-08-08 ENCOUNTER — CLINICAL SUPPORT (OUTPATIENT)
Dept: REHABILITATION | Facility: HOSPITAL | Age: 43
End: 2023-08-08
Payer: COMMERCIAL

## 2023-08-08 DIAGNOSIS — M87.112 AVASCULAR NECROSIS OF LEFT SHOULDER DUE TO ADVERSE EFFECT OF STEROID THERAPY: ICD-10-CM

## 2023-08-08 DIAGNOSIS — G89.29 CHRONIC LEFT SHOULDER PAIN: ICD-10-CM

## 2023-08-08 DIAGNOSIS — T38.0X5A AVASCULAR NECROSIS OF LEFT SHOULDER DUE TO ADVERSE EFFECT OF STEROID THERAPY: ICD-10-CM

## 2023-08-08 DIAGNOSIS — M25.512 CHRONIC LEFT SHOULDER PAIN: ICD-10-CM

## 2023-08-08 PROCEDURE — 97140 MANUAL THERAPY 1/> REGIONS: CPT

## 2023-08-08 PROCEDURE — 97110 THERAPEUTIC EXERCISES: CPT

## 2023-08-08 PROCEDURE — 97162 PT EVAL MOD COMPLEX 30 MIN: CPT

## 2023-08-08 NOTE — TELEPHONE ENCOUNTER
----- Message from Diane Lawrence MA sent at 8/8/2023 11:47 AM CDT -----  Regarding: FW: fu  Please see below message from Beverly.  ----- Message -----  From: Beverly Cardoza  Sent: 8/8/2023  11:19 AM CDT  To: Diane Lawrence MA  Subject: RE: fu                                           If Yuko is available to reach out to her and see how she is feeling and if she feels she needs to be seen sooner than her originally scheduled appointment we can accommodate her but if she is doing well we can just keep her originally scheduled appointment.     Beverly Cardoza   Clinical assistant to Dr. Sharon Babb    ----- Message -----  From: Diane Lawrence MA  Sent: 8/8/2023  11:13 AM CDT  To: Beverly Cardoza  Subject: FW: fu                                           Left me know about this appointment.  ----- Message -----  From: Parish Patterson MD  Sent: 8/8/2023   8:02 AM CDT  To: [facility] Staff  Subject: fu                                               Patient seen in the ED after falling onto her left shoudler with increased pain. Pain was well controlled in the ED. No fracture or dislocation. She will need close followup with Dr. Babb. Thank you!

## 2023-08-08 NOTE — CONSULTS
Orthopedic Surgery  Consult Note    Ivy Salgado  08/07/2023    CC: L shoulder pain s/p humeral head arthroplasty 7/18    HPI: Ivy Salgado is a 43 y.o. 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 Babb who presents with increased left shoulder pain after a fall onto her left shoulder today. Patients states she has had a decent amount of pain prior to the fall.  Patient has erythematous maculopapular rash over posterior L shoulder and scapula extending down to mid humerus that she says has a burning feeling. Denies head injury or LOC. On ASA for blood thinners. Denies other MSK pains or paresthesias.       Past Medical History:   Diagnosis Date    Abnormal Pap smear 2007    Abnormal Pap smear 5/26/2011    Anemia     Anxiety     Arthritis     C. difficile diarrhea     Crohn's disease     Depression 8/5/2017    Encounter for blood transfusion     Genital HSV     History of colposcopy with cervical biopsy 2007 and 7/2011 2007-LYLA I  and 7/2011- LYLA I    Hypertension     Kidney stone     Kidney stone     Melanoma     Recurrent UTI 4/3/2013    S/P ileostomy 7/9/2012    Sterilization 6/23/2012     Past Surgical History:   Procedure Laterality Date    ABDOMINAL SURGERY      APPENDECTOMY      ARTHROPLASTY OF SHOULDER Left 7/18/2023    Procedure: ARTHROPLASTY, SHOULDER;  Surgeon: Sharon Babb MD;  Location: [facility];  Service: Orthopedics;  Laterality: Left;    AUGMENTATION OF BREAST Bilateral 06/2022    gel implants    BILATERAL SALPINGO-OOPHORECTOMY (BSO) Bilateral 05/30/2019    Procedure: SALPINGO-OOPHORECTOMY, BILATERAL;  Surgeon: Rupa German MD;  Location: [address];  Service: OB/GYN;  Laterality: Bilateral;    BLADDER SURGERY      partial cystectomy due to fistula    breast lift      BREAST SURGERY      [facility]      COLON SURGERY      COLONOSCOPY      CYSTOSCOPY  09/23/2020    Procedure:  CYSTOSCOPY;  Surgeon: Sascha Florentino MD;  Location: [facility] OR 1ST FLR;  Service: Urology;;    CYSTOSCOPY W/ URETERAL STENT PLACEMENT Left 09/15/2020    Procedure: CYSTOSCOPY, WITH URETERAL STENT INSERTION;  Surgeon: Sascha Florentino MD;  Location: [facility] OR 1ST FLR;  Service: Urology;  Laterality: Left;    DIAGNOSTIC LAPAROSCOPY N/A 07/09/2020    Procedure: LAPAROSCOPY, DIAGNOSTIC;  Surgeon: Gilson El MD;  Location: [facility] OR;  Service: OB/GYN;  Laterality: N/A;    EXCISION OF MELANOMA  07/17/2019    ILEOSTOMY      LAPAROSCOPIC LYSIS OF ADHESIONS N/A 07/09/2020    Procedure: LYSIS, ADHESIONS, LAPAROSCOPIC;  Surgeon: Gilson El MD;  Location: [facility] OR;  Service: OB/GYN;  Laterality: N/A;    LASER LITHOTRIPSY  09/23/2020    Procedure: LITHOTRIPSY, USING LASER;  Surgeon: Sascha Florentino MD;  Location: [facility] OR 1ST FLR;  Service: Urology;;    LYSIS OF ADHESIONS N/A 05/30/2019    Procedure: LYSIS, ADHESIONS;  Surgeon: Rupa German MD;  Location: [facility] OR 2ND FLR;  Service: OB/GYN;  Laterality: N/A;    OOPHORECTOMY Right 04/16/2015    PORTACATH PLACEMENT  02/21/2017    SKIN BIOPSY      SMALL INTESTINE SURGERY      age 18 Y    TOTAL ABDOMINAL HYSTERECTOMY  04/16/2015    TOTAL COLECTOMY      TUBAL LIGATION  06/06/2012    UPPER GASTROINTESTINAL ENDOSCOPY      URETEROSCOPIC REMOVAL OF URETERIC CALCULUS  09/23/2020    Procedure: REMOVAL, CALCULUS, URETER, URETEROSCOPIC;  Surgeon: Sascha Florentino MD;  Location: [facility] OR 1ST [facility];  Service: Urology;;    URETEROSCOPY Left 09/23/2020    Procedure: URETEROSCOPY;  Surgeon: Sascha Florentino MD;  Location: [facility] OR 1ST FLR;  Service: Urology;  Laterality: Left;     Family History   Problem Relation Age of Onset    Hypertension Mother     Colon cancer Father     Cancer Father         Colon    Liver cancer Father     Hyperlipidemia Father     Endometrial cancer Maternal Aunt     Cancer Maternal Grandfather         Skin    Skin cancer Maternal Grandfather      Diabetes Maternal Grandfather     Heart disease Maternal Grandfather     Hearing loss Paternal Grandmother     Diabetes Paternal Grandfather     Crohn's disease Brother     Breast cancer Maternal Cousin 41    Ovarian cancer Neg Hx     Melanoma Neg Hx      Social History     Socioeconomic History    Marital status: Single   Occupational History     Employer: [employer]   Tobacco Use    Smoking status: Never    Smokeless tobacco: Never   Substance and Sexual Activity    Alcohol use: Not Currently     Alcohol/week: 0.0 standard drinks of alcohol    Drug use: No    Sexual activity: Yes     Partners: Male     Birth control/protection: See Surgical Hx     Comment: HYST   Other Topics Concern    Are you pregnant or think you may be? No    Breast-feeding No     Social Determinants of Health     Financial Resource Strain: Medium Risk (6/30/2023)    Overall Financial Resource Strain (CARDIA)     Difficulty of Paying Living Expenses: Somewhat hard   Food Insecurity: No Food Insecurity (6/30/2023)    Hunger Vital Sign     Worried About Running Out of Food in the Last Year: Never true     Ran Out of Food in the Last Year: Never true   Transportation Needs: No Transportation Needs (6/30/2023)    PRAPARE - Transportation     Lack of Transportation (Medical): No     Lack of Transportation (Non-Medical): No   Physical Activity: Inactive (6/30/2023)    Exercise Vital Sign     Days of Exercise per Week: 0 days     Minutes of Exercise per Session: 30 min   Stress: Stress Concern Present (6/30/2023)    [employer] [place] [employer] - Occupational Stress Questionnaire     Feeling of Stress : Very much   Social Connections: Unknown (6/30/2023)    Social Connection and Isolation Panel [NHANES]     Frequency of Communication with Friends and Family: More than three times a week     Frequency of Social Gatherings with Friends and Family: Once a week     Active Member of Clubs or Organizations: No     Attends Club  or Organization Meetings: 1 to 4 times per year     Marital Status:    Housing Stability: Low Risk  (6/30/2023)    Housing Stability Vital Sign     Unable to Pay for Housing in the Last Year: No     Number of Places Lived in the Last Year: 2     Unstable Housing in the Last Year: No     Current Facility-Administered Medications on File Prior to Encounter   Medication    estradiol valerate (DELESTROGEN) injection 20 mg/mL    estradiol valerate injection 20 mg     Current Outpatient Medications on File Prior to Encounter   Medication Sig    aspirin (ECOTRIN) 81 MG EC tablet Take 1 tablet (81 mg total) by mouth once daily. For 6 weeks starting the day after surgery.    clonazePAM (KLONOPIN) 1 MG tablet TAKE 1 AND 1/2 TABLETS BY MOUTH TWICE DAILY AS NEEDED FOR ANXIETY    cyclobenzaprine (FLEXERIL) 10 MG tablet Take 1 tablet (10 mg total) by mouth every 8 (eight) hours as needed (muscle pain).    droNABinol (MARINOL) 5 MG capsule Take 1 capsule (5 mg total) by mouth 2 (two) times daily before meals.    gabapentin (NEURONTIN) 300 MG capsule Take 1 capsule (300 mg total) by mouth 2 (two) times daily.    LIDOcaine (LIDODERM) 5 % Place 1 patch onto the skin once daily. Remove & Discard patch within 12 hours or as directed by MD (Patient not taking: Reported on 7/31/2023)    loperamide (IMODIUM) 2 mg capsule Take 2 mg by mouth daily as needed for Diarrhea.    metroNIDAZOLE (FLAGYL) 250 MG tablet TAKE 1 TABLET(250 MG) BY MOUTH THREE TIMES DAILY FOR 7 DAYS (Patient not taking: Reported on 7/31/2023)    mirtazapine (REMERON SOL-TAB) 30 MG disintegrating tablet Take 1 tablet (30 mg total) by mouth nightly.    multivitamin (THERAGRAN) per tablet Take 1 tablet by mouth once daily.    nadoloL (CORGARD) 40 MG tablet Take 1 tablet (40 mg total) by mouth once daily.    oxyCODONE (ROXICODONE) 10 mg Tab immediate release tablet Take 1 tablet (10 mg total) by mouth every 4 to 6 hours as needed for Pain.    pantoprazole (PROTONIX)  40 MG tablet Take 1 tablet (40 mg total) by mouth 2 (two) times daily.    promethazine (PHENERGAN) 25 MG tablet TAKE 1 TABLET BY MOUTH EVERY 8 HOURS FOR NAUSEA    promethazine (PHENERGAN) 25 MG tablet Take 1 tablet (25 mg total) by mouth every 6 (six) hours as needed for Nausea.    traMADoL (ULTRAM) 50 mg tablet Take 1-2 tablets ( mg total) by mouth every 6 (six) hours as needed for Pain. (Patient not taking: Reported on 7/31/2023)    valACYclovir (VALTREX) 1000 MG tablet Take one tablet by mouth twice daily for 5 days    vedolizumab (ENTYVIO) 300 mg SolR injection Inject 300 mg into the vein.    zolpidem (AMBIEN) 10 mg Tab Take 1 tablet (10 mg total) by mouth every evening.    [DISCONTINUED] ketorolac (TORADOL) 10 mg tablet Take 1 tablet (10 mg total) by mouth every 8 (eight) hours as needed (breakthrough pain). Take with food. (Patient not taking: Reported on 7/31/2023)    [DISCONTINUED] oxyCODONE (ROXICODONE) 10 mg Tab immediate release tablet Take 1 tablet (10 mg total) by mouth every 4 to 6 hours as needed for Pain.         Review of Systems:  Constitutional: Denies fever/chills  Neurological: Denies numbness/tingling (any exceptions noted in orthopaedic exam)   Psychiatric/Behavioral: Denies change in normal mood  Eyes: Denies change in vision  Cardiovascular: Denies chest pain  Respiratory: Denies shortness of breath  Hematologic/Lymphatic: Denies easy bleeding/bruising   Skin: Denies new rash or skin lesions   Gastrointestinal: Denies nausea/vomitting/diarrhea, change in bowel habits, abdominal pain   Allergic/Immunologic: Denies adverse reactions to current medications  Musculoskeletal: see HPI      Physical Exam:    Temp:  [98.2 °F (36.8 °C)] 98.2 °F (36.8 °C)  Pulse:  [69-85] 85  Resp:  [16-20] 18  SpO2:  [96 %-99 %] 99 %  BP: (120-145)/(83-93) 145/93    Vitals: Afebrile.  Vital signs stable.  General: No acute distress.  HEENT: Normocephalic. Atraumatic. Sclera anicteric. No tracheal  deviation.  Cardio: Regular rate.  Chest: No increased work of breathing.  Abdominal: Nondistended.  Extremities: No cyanosis.  No clubbing.    Neuro: Awake. Alert. Oriented to person, place, time, and situation.  Psych: Normal appearance. Cooperative.  Appropriate mood.  Appropriate affect.        MSK:  RUE:  - Skin intact throughout, no open wounds  - No swelling  - No ecchymosis, erythema, or signs of cellulitis  - NonTTP throughout  - AROM and PROM of the shoulder, elbow, wrist, and hand intact without pain  - Axillary/AIN/PIN/Radial/Median/Ulnar Nerves assessed in isolation without deficit  - SILT throughout  - Compartments soft  - Radial artery palpated   - Capillary Refill <3s    LUE:  - Skin intact throughout, no open wounds, surgical incision c/d/i  - No swelling  - Patient has erythematous maculopapular rash over posterior L shoulder and scapula extending down to mid humerus  - TTP over the shoulder  - AROM and PROM of the elbow, wrist, and hand intact without pain  - ROM of the shoulder limited 2/2 surgery and increased pain  - Axillary/AIN/PIN/Radial/Median/Ulnar Nerves assessed in isolation without deficit  - SILT throughout  - Compartments soft  - Radial artery palpated   - Capillary Refill <3s    RLE:  - Skin intact throughout, no open wounds  - No swelling  - No ecchymosis, erythema, or signs of cellulitis  - NonTTP throughout  - AROM and PROM of the hip, knee, ankle, and foot intact without pain  - TA/EHL/Gastroc/FHL assessed in isolation without deficit  - SILT throughout  - Compartments soft  - DP and PT palpated  - Capillary Refill <3s  - Negative Log roll  - Negative [facility]    LLE:  - Skin intact throughout, no open wounds  - No swelling  - No ecchymosis, erythema, or signs of cellulitis  - NonTTP throughout  - AROM and PROM of the hip, knee, ankle, and foot intact without pain  - TA/EHL/Gastroc/FHL assessed in isolation without deficit  - SILT throughout  - Compartments soft  - DP and PT  palpated  - Capillary Refill <3s  - Negative Log roll  - [facility]    Diagnostic Results: XR of the L shoulder show appropriate alignment of humeral head arthroplasty with no signs of hardware failure or loosening, no dislocation    Assessment/Plan:  Ivy Salgado is a 43 y.o. female with left shoulder pain s/p humeral head arthroplasty on 7/18 with Dr. Babb after a fall onto her shoulder. XR does not appear that the shoulder is dislocated. Patient has had a moderate amount of pain since the surgery. She also has a maculopapular rash over the shoulder and scapula.    - No acute orthopaedic intervention  - NWB LUE in sling at all times  - Will send message for FU in Dr. Babb's clinic this week    Parish Patterson MD  Orthopedic Surgery Resident  08/07/2023

## 2023-08-08 NOTE — DISCHARGE INSTRUCTIONS
Diagnosis:  Fall, shoulder pain, rash    Your x-ray did not show any broken bones.  I suspect that your postop pain has been made worse by your fall.  I am not sure what is causing your rash but I think it may be a reaction to your cooling vest or something else that has touch the area.  I am prescribing some additional pain medication that you can take as needed.    Please follow-up with your orthopedic surgeon and return to the emergency room for any worsening symptoms.

## 2023-08-10 NOTE — PLAN OF CARE
[facility]  Physical Therapy Initial Evaluation    Name: Ivy Salgado  Clinic Number: [id number]    Therapy Diagnosis:   Encounter Diagnoses   Name Primary?    Chronic left shoulder pain     Avascular necrosis of left shoulder due to adverse effect of steroid therapy      Physician: Mono Giles III, *    Physician Orders: PT Eval and Treat  Medical Diagnosis from Referral:   M25.512,G89.29 (ICD-10-CM) - Chronic left shoulder pain   M87.112,T38.0X5A (ICD-10-CM) - Avascular necrosis of left shoulder due to adverse effect of steroid therapy     Evaluation Date: 8/8/2023  Authorization Period Expiration: 7/6/2024  Plan of Care Expiration: 12/31/2023  Progress Note Due: 9/10/2023  Visit # / Visits authorized: 1/ 1  Foto: 1/1     Time In: 1300  Time Out: 1400  Total Billable Time: 60 minutes    DOS: 7/18/2023  S/p: left  1. Total shoulder arthroplasty (complex, -22 modifier)  2. Shoulder lysis of adhesions  3. Shoulder subpectoral biceps tenodesis (CPT 92253)  Post-Op Precautions: We will follow the shoulder arthroplasty guidelines. The patient remained in a sling for 6 weeks. We will start PT at the 3-week martina.       Precautions: Fall    Subjective     Date of onset: chronic with recent surgery  History of current condition - Ivy reports: She has chron's disease and was on prednisone since 10 y.o. though in the past few years has not taken any steroids. The patient reports gradual decrease in functional use of her L UE and inability to reach OH, lift objects or have dexterity of her UE. The patient underwent TSA on 7/18/2023 to address her functional deficits and pain. The patient reports that she has been in a lot of pain and during her f.u with Albert Morgan he advised her to come out of the sling a little bit while at home in a safe environment. The patient reports, that she followed his advise and was feeling a lot better but yesterday she stood up too fast and as she did  tripped over her dog at her feet. The patient reports she was not wearing the sling at the time. The patient notes she fell on the ground on her surgical side and felt immediate pain. She was unable to get off the floor I but able to call her father for help. The patient reports going to ER where imaging was taken and per documentation Orthopedics was consulted to determine any shoulder abnormalities. The patient reports that since the fall she has been in an extreme about of pain and is scared she did something bad to her arm during the fall. She does not yet have a f/u with Dr. Babb's office though she is working with the team to get in via [facility]. The patient denies any radiating pain down her arm. The patient notes that she has some numbness over he lateral arm that she is not sure was there before and that she has been having neck discomfort likely due to the sling. She has been sleeping in bed on a wedge to keep her more upright which is helping.      Imaging 8/7/2023 (In ED): 1. Suboptimal evaluation of the shoulder given positioning, no definite dislocation however correlation is advised.    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   Current Level of Function: sig pain and post op restricting motion and function     Pain:  Current 7/10, worst 10/10, best 6/10   Location: left anterior shoulder   Description: Aching, Grabbing, Tight, Deep, and Sharp  Aggravating Factors: Sitting, Standing, Laying, Touching, Walking, Night Time, Morning, Extension, Flexing, Lifting, and Getting out of bed/chair  Easing Factors: pain medication, ice, and nothing    Pts goals: return to work      Medical History:   Past Medical History:    Diagnosis Date    Abnormal Pap smear 2007    Abnormal Pap smear 5/26/2011    Anemia     Anxiety     Arthritis     C. difficile diarrhea     Crohn's disease     Depression 8/5/2017    Encounter for blood transfusion     Genital HSV     History of colposcopy with cervical biopsy 2007 and 7/2011 2007-LYLA I  and 7/2011- LYLA I    Hypertension     Kidney stone     Kidney stone     Melanoma     Recurrent UTI 4/3/2013    S/P ileostomy 7/9/2012    Sterilization 6/23/2012       Surgical History:   Ivy Salgado  has a past surgical history that includes Total colectomy; Ileostomy; Colon surgery; Upper gastrointestinal endoscopy; Colonoscopy; CKC; Appendectomy; Bladder surgery; Skin biopsy; Abdominal surgery; Oophorectomy (Right, 04/16/2015); Portacath placement (02/21/2017); Total abdominal hysterectomy (04/16/2015); Small intestine surgery; Tubal ligation (06/06/2012); Bilateral salpingo-oophorectomy (BSO) (Bilateral, 05/30/2019); Lysis of adhesions (N/A, 05/30/2019); Excision of melanoma (07/17/2019); Diagnostic laparoscopy (N/A, 07/09/2020); Laparoscopic lysis of adhesions (N/A, 07/09/2020); Cystoscopy w/ ureteral stent placement (Left, 09/15/2020); Ureteroscopy (Left, 09/23/2020); Cystoscopy (09/23/2020); Laser lithotripsy (09/23/2020); Ureteroscopic removal of ureteric calculus (09/23/2020); breast lift; Breast surgery; Augmentation of breast (Bilateral, 06/2022); and Arthroplasty of shoulder (Left, 7/18/2023).    Medications:   Ivy has a current medication list which includes the following prescription(s): aspirin, clonazepam, cyclobenzaprine, dronabinol, gabapentin, ketorolac, lidocaine, loperamide, metronidazole, mirtazapine, multivitamin, nadolol, oxycodone, oxycodone, pantoprazole, promethazine, promethazine, tramadol, valacyclovir, vedolizumab, and zolpidem, and the following Facility-Administered Medications: estradiol valerate and estradiol valerate.    Allergies:   Review of patient's allergies  indicates:   Allergen Reactions    Azathioprine sodium Other (See Comments)     Other reaction(s): pancreatitis  Other reaction(s): pancreatitis    Methotrexate analogues Other (See Comments)     leukopenia    Stelara [ustekinumab] Other (See Comments)     Multiple infections    Zofran [ondansetron hcl (pf)] Other (See Comments)     Per patient causes prolong QT    Vancomycin analogues Other (See Comments)     Made her red    Azathioprine      Other reaction(s): Unknown    Methotrexate      Other reaction(s): infection-    Morphine Itching and Other (See Comments)     Other reaction(s): Itching    Zofran [ondansetron hcl]      Other reaction(s): Hives    Bactrim [sulfamethoxazole-trimethoprim] Palpitations    Ciprofloxacin Palpitations        Objective     Observation: Pt in apparent distress due to pain though able to hold a conversation and move about the clinic without need for guarding     Posture: sig OA extension, dowager's hump, sling donned on L arm        Shoulder Passive Range of Motion within Protcol Limits:    Right Left   Flexion   170 NT   ER at 0   50 10 from 0 *pain    ER at 90   90 NT   IR   70 NT     Elbow Active Range of Motion within Protcol Limits   Right Left   Flexion   140 130 *pain   Extension   0 0 *pain    Supination 90 80 *pain    Pronation 90 80* pain      Cervical Active Range of Motion: NT     Wrist Active Range of Motion : Pain with extension/Flexion       Palpation: high sensitivity to light touch at shoulder. Ant humeral head noted.       Sensation: Intact      Pulses: Intact       Special Tests: Not performed today due to post-op status     Strength: Not assessed today due to post-op status.     Joint Mobility: Not assessed today due to post-op status.        CMS Impairment/Limitation/Restriction for FOTO Shoulder Survey    Therapist reviewed FOTO scores for Ivy Salgado on 8/8/2023.   FOTO documents entered into [employer] - see Media section.    Limitation Score: see  media%  Category: Mobility       Treatment     Treatment Time In: 1340  Treatment Time Out: 1400  Total Treatment time separate from Evaluation: 20 minutes    Ivy received the following:     Manual Therapy to develop ROM for 10 minutes including:  Skilled PROM of L elbow into flexion/ext/pron/sup in 10 from 0 deg ER at side. Did not complete any other movement due to high pain level and need for MD assessment 2/2 to recent fall.   Scar mobs gentle     Therapeutic exercises to develop ROM for 10 minutes including:  AAROM elbow Flex/Ext 20x   Putty   Scar mobs self     Home Exercises and Patient Education Provided     Education provided:   - Sling wear and lifting restrictions were reviewed  - Prognosis, activity modification, goals for therapy, role of therapy for care, exercises/HEP    Written Home Exercises Provided: yes.  Exercises were reviewed and Ivy was able to demonstrate them prior to the end of the session.   Pt received a written copy of exercises to perform at home. Ivy demonstrated good  understanding of the education provided.     See EMR under patient instructions for exercises given.     Assessment     Ivy is a 43 y.o. female referred to outpatient Physical Therapy s/p TSA on 7/18/2023. The patient has a complex initial presentation due to recent fall impacting her shoulder and causing sig high pain levels, sensitivity to touch. The patient demonstrates significant post operative restrictions in shoulder AROM/PROM, underlying strength deficits, pain, difficulty sleeping and decreased ability to independently complete ADLs and IADLs decreasing quality. Pt will benefit from skilled outpatient Physical Therapy to address the deficits stated above and in the chart below, provide pt/family education, and to maximize pt's level of independence. Pt prognosis is Fair.      Plan of care discussed with patient: Yes  Pt's spiritual, cultural and educational needs considered and patient is agreeable  to the plan of care and goals as stated below:       Anticipated Barriers for therapy: chronic steroid use, heavy lifting job demands     Medical Necessity is demonstrated by the following  History  Co-morbidities and personal factors that may impact the plan of care [] LOW: no personal factors / co-morbidities  [] MODERATE: 1-2 personal factors / co-morbidities  [x] HIGH: 3+ personal factors / co-morbidities    Moderate / High Support Documentation:   Co-morbidities affecting plan of care: Chronic AVN of L shoulder, Chrons, fall risk     Personal Factors:   coping style     Examination  Body Structures and Functions, activity limitations and participation restrictions that may impact the plan of care [] LOW: addressing 1-2 elements  [x] MODERATE: 3+ elements  [] HIGH: 4+ elements (please support below)    Moderate / High Support Documentation: fall risk, dec ROM, high pain levels, abnormal sensitivity to touch      Clinical Presentation [] LOW: stable  [x] MODERATE: Evolving  [] HIGH: Unstable     Decision Making/ Complexity Score: moderate       Goals:  Short Term Goals: 12 weeks  1. Pt will be compliant with HEP 50% of prescribed amount.   2. The pt to demo improvement in R shoulder PROM to by 80% of the L  3.  The pt to demo tolerance to being out of the sling for 24 hours with pain <2/10     Long Term Goals:  36 weeks   Pt will be compliant with % of prescribed amount.   The pt to improvement in AROM of L shoulder within 80% of R shoulder to improve tolerance to OH movement   The pt to  Demo at least 4+/5 of RTC muscle testing to demo improvement in tolerance to activity  The pt to tolerate lifting 50# from the ground to waist height per job requirement description   The pt will report full participation in ADLs and IADLs without restrictions related to L Shoulder.      Plan   Plan of care Certification: 8/8/2023 to 12/31/2023.    Outpatient Physical Therapy 2 times weekly for 36 weeks to include the  following interventions: Manual Therapy, Moist Heat/ Ice, Neuromuscular Re-ed, Patient Education, Therapeutic Activites, and Therapeutic Exercise.     Dorothy Basurto, PT , DPT, SCS, FAAMOMPT

## 2023-08-11 ENCOUNTER — HOSPITAL ENCOUNTER (OUTPATIENT)
Dept: RADIOLOGY | Facility: HOSPITAL | Age: 43
Discharge: HOME | End: 2023-08-11
Attending: PHYSICIAN ASSISTANT
Payer: COMMERCIAL

## 2023-08-11 ENCOUNTER — OFFICE VISIT (OUTPATIENT)
Dept: SPORTS MEDICINE | Facility: CLINIC | Age: 43
End: 2023-08-11
Payer: COMMERCIAL

## 2023-08-11 VITALS
DIASTOLIC BLOOD PRESSURE: 80 MMHG | BODY MASS INDEX: 19.98 KG/M2 | HEIGHT: 61 IN | WEIGHT: 105.81 LBS | HEART RATE: 71 BPM | SYSTOLIC BLOOD PRESSURE: 107 MMHG

## 2023-08-11 DIAGNOSIS — Z98.890 S/P SHOULDER SURGERY: ICD-10-CM

## 2023-08-11 DIAGNOSIS — M25.512 LEFT SHOULDER PAIN, UNSPECIFIED CHRONICITY: ICD-10-CM

## 2023-08-11 DIAGNOSIS — M25.512 ACUTE PAIN OF LEFT SHOULDER: Primary | ICD-10-CM

## 2023-08-11 PROCEDURE — 99024 POSTOP FOLLOW-UP VISIT: CPT | Mod: S$GLB,,, | Performed by: PHYSICIAN ASSISTANT

## 2023-08-11 PROCEDURE — 73000 XR CLAVICLE LEFT: ICD-10-PCS | Mod: 26,LT,, | Performed by: RADIOLOGY

## 2023-08-11 PROCEDURE — 73030 XR SHOULDER COMPLETE 2 OR MORE VIEWS LEFT: ICD-10-PCS | Mod: 26,LT,, | Performed by: RADIOLOGY

## 2023-08-11 PROCEDURE — 3074F SYST BP LT 130 MM HG: CPT | Mod: CPTII,S$GLB,, | Performed by: PHYSICIAN ASSISTANT

## 2023-08-11 PROCEDURE — 99999 PR PBB SHADOW E&M-EST. PATIENT-LVL V: CPT | Mod: PBBFAC,,, | Performed by: PHYSICIAN ASSISTANT

## 2023-08-11 PROCEDURE — 3074F PR MOST RECENT SYSTOLIC BLOOD PRESSURE < 130 MM HG: ICD-10-PCS | Mod: CPTII,S$GLB,, | Performed by: PHYSICIAN ASSISTANT

## 2023-08-11 PROCEDURE — 3008F BODY MASS INDEX DOCD: CPT | Mod: CPTII,S$GLB,, | Performed by: PHYSICIAN ASSISTANT

## 2023-08-11 PROCEDURE — 73000 X-RAY EXAM OF COLLAR BONE: CPT | Mod: TC,LT

## 2023-08-11 PROCEDURE — 1160F PR REVIEW ALL MEDS BY PRESCRIBER/CLIN PHARMACIST DOCUMENTED: ICD-10-PCS | Mod: CPTII,S$GLB,, | Performed by: PHYSICIAN ASSISTANT

## 2023-08-11 PROCEDURE — 1159F MED LIST DOCD IN RCRD: CPT | Mod: CPTII,S$GLB,, | Performed by: PHYSICIAN ASSISTANT

## 2023-08-11 PROCEDURE — 73030 X-RAY EXAM OF SHOULDER: CPT | Mod: 26,LT,, | Performed by: RADIOLOGY

## 2023-08-11 PROCEDURE — 73030 X-RAY EXAM OF SHOULDER: CPT | Mod: TC,LT

## 2023-08-11 PROCEDURE — 1160F RVW MEDS BY RX/DR IN RCRD: CPT | Mod: CPTII,S$GLB,, | Performed by: PHYSICIAN ASSISTANT

## 2023-08-11 PROCEDURE — 73000 X-RAY EXAM OF COLLAR BONE: CPT | Mod: 26,LT,, | Performed by: RADIOLOGY

## 2023-08-11 PROCEDURE — 99024 PR POST-OP FOLLOW-UP VISIT: ICD-10-PCS | Mod: S$GLB,,, | Performed by: PHYSICIAN ASSISTANT

## 2023-08-11 PROCEDURE — 1159F PR MEDICATION LIST DOCUMENTED IN MEDICAL RECORD: ICD-10-PCS | Mod: CPTII,S$GLB,, | Performed by: PHYSICIAN ASSISTANT

## 2023-08-11 PROCEDURE — 3008F PR BODY MASS INDEX (BMI) DOCUMENTED: ICD-10-PCS | Mod: CPTII,S$GLB,, | Performed by: PHYSICIAN ASSISTANT

## 2023-08-11 PROCEDURE — 3079F PR MOST RECENT DIASTOLIC BLOOD PRESSURE 80-89 MM HG: ICD-10-PCS | Mod: CPTII,S$GLB,, | Performed by: PHYSICIAN ASSISTANT

## 2023-08-11 PROCEDURE — 99999 PR PBB SHADOW E&M-EST. PATIENT-LVL V: ICD-10-PCS | Mod: PBBFAC,,, | Performed by: PHYSICIAN ASSISTANT

## 2023-08-11 PROCEDURE — 3079F DIAST BP 80-89 MM HG: CPT | Mod: CPTII,S$GLB,, | Performed by: PHYSICIAN ASSISTANT

## 2023-08-11 NOTE — PROGRESS NOTES
Chief Complaint: left shoulder pain    HISTORY OF PRESENT ILLNESS:   Patient presents to clinic for unplanned post op evaluation of left shoulder after below listed surgery. Pain today 3/10. Denies nausea, vomiting, fever, chills. Doing well but she is was having severe pain of her shoulder immediately after surgery. She also suffered a mechanical trip and fall last week while getting of the sofa and landed on her left shoulder. Despite this, her shoulder pain in improved toady from when I last saw her. She is weaning off pain medications. Had negative xrays in the Ed which were not great quality. Will repeat then today.      Wearing sling which is in place.  With her father today.    She reports possibly having fractured her left clavicle in the past.                                                                               No issues reported    DATE OF PROCEDURE: 07/18/2023  SURGEON: Sharon Babb M.D.   PROCEDURE PERFORMED:   left  1. Total shoulder arthroplasty (complex, -22 modifier)  2. Shoulder lysis of adhesions  3. Shoulder subpectoral biceps tenodesis (CPT 08936)     Review of Systems   Constitution: Negative. Negative for chills, fever and night sweats.   HENT: Negative for congestion and headaches.    Eyes: Negative for blurred vision, left vision loss and right vision loss.   Cardiovascular: Negative for chest pain and syncope.   Respiratory: Negative for cough and shortness of breath.    Endocrine: Negative for polydipsia, polyphagia and polyuria.   Hematologic/Lymphatic: Negative for bleeding problem. Does not bruise/bleed easily.   Skin: Negative for dry skin, itching and rash.   Musculoskeletal: Negative for falls and muscle weakness.   Gastrointestinal: Negative for abdominal pain and bowel incontinence.   Genitourinary: Negative for bladder incontinence and nocturia.   Neurological: Negative for disturbances in coordination, loss of balance and seizures.   Psychiatric/Behavioral: Negative for  depression. The patient does not have insomnia.    Allergic/Immunologic: Negative for hives and persistent infections.     PE:    Vitals:    08/11/23 0904   BP: 107/80   Pulse: 71       Incision clean/dry/intact  No sign of infection  Mild- swelling noted  Compartments soft  Forearm soft and not tender bilateral  Neurovascular status intact in extremity  Radial pulses intact and 2+. Capillary refill is <3 seconds. No Discoloration.   No blood clots suspected.    Should ROM is appropriate for this point after surgery.   I was able to passively move the shoulder today with little pain  Mild TTP of mid shaft of clavicle. No crepitus.       RADIOGRAPHS:  2 views.  left shoulder arthroplasty identified.  The hardware and shoulder alignment is satisfactory and unchanged as compared to the previous study. Shoulder appears located in the joint. Clavicle xrays reviewed with no signs of fracture or displacement.     Assessment:  Left shoulder pain after fall.   Appropriate left TSA surgery recovery at 3 weeks. No complications.     Continue pain medications as needed but continue to wean off as tolerated. Flexeril as needed.   Start moving shoulder more then doing desk and counter activities.   2. Evaluated incision. No signs of infection.  3. May shower now without covering incisions.  4. Continue ASA 81mg once a day.  5. Continue PT.    6. Discussed case with PT.   RTC for 6 week post op appt.

## 2023-08-12 DIAGNOSIS — F41.9 ANXIETY: ICD-10-CM

## 2023-08-14 ENCOUNTER — CLINICAL SUPPORT (OUTPATIENT)
Dept: REHABILITATION | Facility: HOSPITAL | Age: 43
End: 2023-08-14
Payer: COMMERCIAL

## 2023-08-14 DIAGNOSIS — M25.512 ACUTE PAIN OF LEFT SHOULDER: Primary | ICD-10-CM

## 2023-08-14 DIAGNOSIS — M25.619 DECREASED RANGE OF MOTION OF SHOULDER, UNSPECIFIED LATERALITY: ICD-10-CM

## 2023-08-14 PROCEDURE — 97140 MANUAL THERAPY 1/> REGIONS: CPT

## 2023-08-14 PROCEDURE — 97110 THERAPEUTIC EXERCISES: CPT

## 2023-08-14 PROCEDURE — 97112 NEUROMUSCULAR REEDUCATION: CPT

## 2023-08-14 RX ORDER — OXYCODONE HYDROCHLORIDE 10 MG/1
10 TABLET ORAL
Qty: 20 TABLET | Refills: 0 | Status: SHIPPED | OUTPATIENT
Start: 2023-08-14 | End: 2023-08-21 | Stop reason: SDUPTHER

## 2023-08-15 RX ORDER — ZOLPIDEM TARTRATE 10 MG/1
10 TABLET ORAL NIGHTLY
Qty: 30 TABLET | Refills: 0 | Status: SHIPPED | OUTPATIENT
Start: 2023-08-15 | End: 2023-09-15 | Stop reason: SDUPTHER

## 2023-08-16 NOTE — PROGRESS NOTES
[facility]   Physical Therapy Treatment Note     Name: Ivy Salgado  Clinic Number: [id number]    Therapy Diagnosis:   Encounter Diagnoses   Name Primary?    Acute pain of left shoulder Yes    Decreased range of motion of shoulder, unspecified laterality      Physician: Luis Madden DO    Visit Date: 8/14/2023       Evaluation Date: 8/8/2023  Authorization Period Expiration: 7/6/2024  Plan of Care Expiration: 12/31/2023  Progress Note Due: 9/10/2023  Visit # / Visits authorized: 1/ 20  Foto: 1/1      Time In: 1000  Time Out: 1100  Total Billable Time: 60 minutes     DOS: 7/18/2023  S/p: left  1. Total shoulder arthroplasty (complex, -22 modifier)  2. Shoulder lysis of adhesions  3. Shoulder subpectoral biceps tenodesis (CPT 58611)  Post-Op Precautions: We will follow the shoulder arthroplasty guidelines. The patient remained in a sling for 6 weeks. We will start PT at the 3-week martina.         Precautions: Fall    SUBJECTIVE     Pt reports: she is doing a bit better. Went to see Albert Giles and he  r/o any abnormalities to the shoulder.     She was compliant with home exercise program.  Response to previous treatment: improvement in pain at rest   Functional change: able to josefa being out of sling for a little bit     Pain: 9/10  Location: left shoulder      OBJECTIVE     Shoulder Flexion PROM: 60 ; ER PROM to 0     Treatment       vIy received the treatments listed below:      Therapeutic exercises to develop ROM for 28 minutes including:  Pulleys 5m   Table slides 5m   Elbow flex/ext 20x    Manual therapy techniques: Joint mobilizations and Soft tissue Mobilization were applied to the: L shoulder for 18 minutes, including:  Skilled PROM of L shoulder within tolerance and protocol in flexion/ER/IR, elbow flex/ext, wrist sup/pronation   Scar mobs     Neuromuscular re-education activities to improve: Posture for 08 minutes. The following activities were included:  Scap squeeze   20x   Shld elevation 20x           Patient Education and Home Exercises     Home Exercises Provided and Patient Education Provided     Education provided:   - Add new exercises     Written Home Exercises Provided: yes. Exercises were reviewed and Ivy was able to demonstrate them prior to the end of the session.  Ivy demonstrated good  understanding of the education provided. See EMR under Patient Instructions for exercises provided during therapy sessions    ASSESSMENT     The patient with fair tolerance to manual therapy today, able to get to 60 deg of shoulder flexion PROM following slow skilled PROM of L shoulder. The patient with fair to good tolerance to exercises.     Ivy Is progressing well towards her goals.     Pt prognosis is Fair.     Pt will continue to benefit from skilled outpatient physical therapy to address the deficits listed in the problem list box on initial evaluation, provide pt/family education and to maximize pt's level of independence in the home and community environment.     Pt's spiritual, cultural and educational needs considered and pt agreeable to plan of care and goals.     Anticipated barriers to physical therapy: none     Goals:   Short Term Goals: 12 weeks  1. Pt will be compliant with HEP 50% of prescribed amount.   2. The pt to demo improvement in R shoulder PROM to by 80% of the L  3.  The pt to demo tolerance to being out of the sling for 24 hours with pain <2/10     Long Term Goals:  36 weeks   Pt will be compliant with % of prescribed amount.   The pt to improvement in AROM of L shoulder within 80% of R shoulder to improve tolerance to OH movement   The pt to  Demo at least 4+/5 of RTC muscle testing to demo improvement in tolerance to activity  The pt to tolerate lifting 50# from the ground to waist height per job requirement description   The pt will report full participation in ADLs and IADLs without restrictions related to L Shoulder.      PLAN     Follow  TSA procedure     Dorothy Basurto, PT PT, DPT, SCS, FAAOMPT

## 2023-08-17 ENCOUNTER — CLINICAL SUPPORT (OUTPATIENT)
Dept: REHABILITATION | Facility: HOSPITAL | Age: 43
End: 2023-08-17
Payer: COMMERCIAL

## 2023-08-17 DIAGNOSIS — Z79.899 ENCOUNTER FOR LONG-TERM (CURRENT) USE OF HIGH-RISK MEDICATION: ICD-10-CM

## 2023-08-17 DIAGNOSIS — M25.612 DECREASED RANGE OF MOTION OF LEFT SHOULDER: Primary | ICD-10-CM

## 2023-08-17 DIAGNOSIS — K50.819 CROHN'S DISEASE OF BOTH SMALL AND LARGE INTESTINE WITH COMPLICATION: ICD-10-CM

## 2023-08-17 PROCEDURE — 97110 THERAPEUTIC EXERCISES: CPT

## 2023-08-17 PROCEDURE — 97112 NEUROMUSCULAR REEDUCATION: CPT

## 2023-08-17 PROCEDURE — 97140 MANUAL THERAPY 1/> REGIONS: CPT

## 2023-08-17 RX ORDER — DRONABINOL 5 MG/1
5 CAPSULE ORAL
Qty: 60 CAPSULE | Refills: 1 | Status: SHIPPED | OUTPATIENT
Start: 2023-08-17 | End: 2023-10-25 | Stop reason: SDUPTHER

## 2023-08-19 PROBLEM — M25.612 DECREASED RANGE OF MOTION OF LEFT SHOULDER: Status: ACTIVE | Noted: 2023-08-19

## 2023-08-19 NOTE — PROGRESS NOTES
[facility]   Physical Therapy Treatment Note     Name: Ivy Salgado  Clinic Number: [id number]    Therapy Diagnosis:   No diagnosis found.    Physician: Luis Madden DO    Visit Date: 8/17/2023       Evaluation Date: 8/8/2023  Authorization Period Expiration: 7/6/2024  Plan of Care Expiration: 12/31/2023  Progress Note Due: 9/10/2023  Visit # / Visits authorized: 1/ 20  Foto: 1/1      Time In: 1000  Time Out: 1100  Total Billable Time: 30 minutes     DOS: 7/18/2023  S/p: left  1. Total shoulder arthroplasty (complex, -22 modifier)  2. Shoulder lysis of adhesions  3. Shoulder subpectoral biceps tenodesis (CPT 29870)  Post-Op Precautions: We will follow the shoulder arthroplasty guidelines. The patient remained in a sling for 6 weeks. We will start PT at the 3-week martina.         Precautions: Fall    SUBJECTIVE     Pt reports: was sore doing exercises but feeling OK .     She was compliant with home exercise program.  Response to previous treatment: improvement in pain at rest   Functional change: able to josefa being out of sling for a little bit     Pain: 7/10  Location: left shoulder      OBJECTIVE     Shoulder Flexion PROM: 60 ; ER PROM to 0     Treatment       Ivy received the treatments listed below:      Therapeutic exercises to develop ROM for 21 minutes including:  Pulleys 5m   Table slides 5m   Elbow flex/ext 20x    Manual therapy techniques: Joint mobilizations and Soft tissue Mobilization were applied to the: L shoulder for 18 minutes, including:  Skilled PROM of L shoulder within tolerance and protocol in flexion/ER/IR, elbow flex/ext, wrist sup/pronation   Scar mobs     Neuromuscular re-education activities to improve: Posture for  minutes. The following activities were included:  Scap squeeze  20x   Shld elevation 20x   AAROM elbow flex ext with sup dowel hold 20x        Patient Education and Home Exercises     Home Exercises Provided and Patient Education Provided      Education provided:   - Add new exercises     Written Home Exercises Provided: yes. Exercises were reviewed and Ivy was able to demonstrate them prior to the end of the session.  Ivy demonstrated good  understanding of the education provided. See EMR under Patient Instructions for exercises provided during therapy sessions    ASSESSMENT     The patient with continued improvement in tolerance to PROM today able to achieve 70 deg of flexion passively. Good josefa to therex though fatigues easily as expected at this stage post op with PMH.     Ivy Is progressing well towards her goals.     Pt prognosis is Fair.     Pt will continue to benefit from skilled outpatient physical therapy to address the deficits listed in the problem list box on initial evaluation, provide pt/family education and to maximize pt's level of independence in the home and community environment.     Pt's spiritual, cultural and educational needs considered and pt agreeable to plan of care and goals.     Anticipated barriers to physical therapy: none     Goals:   Short Term Goals: 12 weeks  1. Pt will be compliant with HEP 50% of prescribed amount.   2. The pt to demo improvement in R shoulder PROM to by 80% of the L  3.  The pt to demo tolerance to being out of the sling for 24 hours with pain <2/10     Long Term Goals:  36 weeks   Pt will be compliant with % of prescribed amount.   The pt to improvement in AROM of L shoulder within 80% of R shoulder to improve tolerance to OH movement   The pt to  Demo at least 4+/5 of RTC muscle testing to demo improvement in tolerance to activity  The pt to tolerate lifting 50# from the ground to waist height per job requirement description   The pt will report full participation in ADLs and IADLs without restrictions related to L Shoulder.      PLAN     Follow TSA procedure     Dorothy Basurto, PT PT, DPT, SCS, FAAOMPT

## 2023-08-21 ENCOUNTER — CLINICAL SUPPORT (OUTPATIENT)
Dept: REHABILITATION | Facility: HOSPITAL | Age: 43
End: 2023-08-21
Payer: COMMERCIAL

## 2023-08-21 DIAGNOSIS — M25.512 ACUTE PAIN OF LEFT SHOULDER: Primary | ICD-10-CM

## 2023-08-21 DIAGNOSIS — M25.612 DECREASED RANGE OF MOTION OF LEFT SHOULDER: ICD-10-CM

## 2023-08-21 PROCEDURE — 97110 THERAPEUTIC EXERCISES: CPT

## 2023-08-21 PROCEDURE — 97140 MANUAL THERAPY 1/> REGIONS: CPT

## 2023-08-21 PROCEDURE — 97112 NEUROMUSCULAR REEDUCATION: CPT

## 2023-08-21 RX ORDER — PROMETHAZINE HYDROCHLORIDE 25 MG/1
TABLET ORAL
Qty: 30 TABLET | Refills: 1 | Status: SHIPPED | OUTPATIENT
Start: 2023-08-21 | End: 2023-09-20 | Stop reason: SDUPTHER

## 2023-08-21 RX ORDER — OXYCODONE HYDROCHLORIDE 10 MG/1
10 TABLET ORAL
Qty: 15 TABLET | Refills: 0 | Status: SHIPPED | OUTPATIENT
Start: 2023-08-21 | End: 2023-08-30

## 2023-08-24 NOTE — PROGRESS NOTES
[facility]   Physical Therapy Treatment Note     Name: Ivy Salgado  Clinic Number: [id number]    Therapy Diagnosis:   No diagnosis found.    Physician: Luis Madden DO    Visit Date: 8/21/2023       Evaluation Date: 8/8/2023  Authorization Period Expiration: 7/6/2024  Plan of Care Expiration: 12/31/2023  Progress Note Due: 9/10/2023  Visit # / Visits authorized: 2/ 20  Foto: 1/1      Time In: 1000  Time Out: 1100  Total Billable Time: 55 minutes     DOS: 7/18/2023  S/p: left  1. Total shoulder arthroplasty (complex, -22 modifier)  2. Shoulder lysis of adhesions  3. Shoulder subpectoral biceps tenodesis (CPT 38890)  Post-Op Precautions: We will follow the shoulder arthroplasty guidelines. The patient remained in a sling for 6 weeks. We will start PT at the 3-week martina.         Precautions: Fall    SUBJECTIVE     Pt reports: no sig change, doing OK.     She was compliant with home exercise program.  Response to previous treatment: improvement in pain at rest   Functional change: able to josefa being out of sling for a little bit     Pain: 5/10  Location: left shoulder      OBJECTIVE     Shoulder Flexion PROM: 90 ; ER PROM to 5    Treatment       Ivy received the treatments listed below:      Therapeutic exercises to develop ROM for 21 minutes including:  Pulleys 5m   Table slides 5m   Elbow flex/ext 20x    Manual therapy techniques: Joint mobilizations and Soft tissue Mobilization were applied to the: L shoulder for 18 minutes, including:  Skilled PROM of L shoulder within tolerance and protocol in flexion/ER/IR, elbow flex/ext, wrist sup/pronation   Scar mobs     Neuromuscular re-education activities to improve: Posture for 30 minutes. The following activities were included:  Scap squeeze  20x   Elbow flexion with dowel for neutral ER focus 30x   AAROM ER supine 30x 3s holds     Heat & education x10m     Patient Education and Home Exercises     Home Exercises Provided and Patient  Education Provided     Education provided:   - Add new exercises     Written Home Exercises Provided: yes. Exercises were reviewed and Ivy was able to demonstrate them prior to the end of the session.  Ivy demonstrated good  understanding of the education provided. See EMR under Patient Instructions for exercises provided during therapy sessions    ASSESSMENT     The patient with continued improvement in shoulder flexion, able to achieve 80 deg of passive ROM and to 5 deg of ER today during manual. The patient with difficulty achieving same ROM with AAROM but good effort seen.     Ivy Is progressing well towards her goals.     Pt prognosis is Fair.     Pt will continue to benefit from skilled outpatient physical therapy to address the deficits listed in the problem list box on initial evaluation, provide pt/family education and to maximize pt's level of independence in the home and community environment.     Pt's spiritual, cultural and educational needs considered and pt agreeable to plan of care and goals.     Anticipated barriers to physical therapy: none     Goals:   Short Term Goals: 12 weeks  1. Pt will be compliant with HEP 50% of prescribed amount.   2. The pt to demo improvement in R shoulder PROM to by 80% of the L  3.  The pt to demo tolerance to being out of the sling for 24 hours with pain <2/10     Long Term Goals:  36 weeks   Pt will be compliant with % of prescribed amount.   The pt to improvement in AROM of L shoulder within 80% of R shoulder to improve tolerance to [place] movement   The pt to  Demo at least 4+/5 of RTC muscle testing to demo improvement in tolerance to activity  The pt to tolerate lifting 50# from the ground to waist height per job requirement description   The pt will report full participation in ADLs and IADLs without restrictions related to L Shoulder.      PLAN     Follow TSA procedure     Dorothy Basurto, PT PT, DPT, SCS, FAAOMPT

## 2023-08-25 ENCOUNTER — TELEPHONE (OUTPATIENT)
Dept: SPORTS MEDICINE | Facility: CLINIC | Age: 43
End: 2023-08-25
Payer: COMMERCIAL

## 2023-08-25 ENCOUNTER — CLINICAL SUPPORT (OUTPATIENT)
Dept: REHABILITATION | Facility: HOSPITAL | Age: 43
End: 2023-08-25
Payer: COMMERCIAL

## 2023-08-25 ENCOUNTER — PATIENT MESSAGE (OUTPATIENT)
Dept: SPORTS MEDICINE | Facility: CLINIC | Age: 43
End: 2023-08-25
Payer: COMMERCIAL

## 2023-08-25 DIAGNOSIS — G89.29 CHRONIC LEFT SHOULDER PAIN: Primary | ICD-10-CM

## 2023-08-25 DIAGNOSIS — Z98.890 S/P SHOULDER SURGERY: ICD-10-CM

## 2023-08-25 DIAGNOSIS — M25.512 CHRONIC LEFT SHOULDER PAIN: Primary | ICD-10-CM

## 2023-08-25 DIAGNOSIS — M25.612 DECREASED RANGE OF MOTION OF LEFT SHOULDER: Primary | ICD-10-CM

## 2023-08-25 DIAGNOSIS — G90.512 COMPLEX REGIONAL PAIN SYNDROME TYPE 1 OF LEFT UPPER EXTREMITY: ICD-10-CM

## 2023-08-25 PROCEDURE — 97112 NEUROMUSCULAR REEDUCATION: CPT | Mod: CQ

## 2023-08-25 PROCEDURE — 97140 MANUAL THERAPY 1/> REGIONS: CPT | Mod: CQ

## 2023-08-25 PROCEDURE — 97110 THERAPEUTIC EXERCISES: CPT | Mod: CQ

## 2023-08-25 RX ORDER — PREGABALIN 75 MG/1
75 CAPSULE ORAL 2 TIMES DAILY
Qty: 30 CAPSULE | Refills: 0 | Status: SHIPPED | OUTPATIENT
Start: 2023-08-25 | End: 2023-08-30

## 2023-08-25 RX ORDER — CYCLOBENZAPRINE HCL 10 MG
10 TABLET ORAL NIGHTLY
Qty: 15 TABLET | Refills: 0 | Status: SHIPPED | OUTPATIENT
Start: 2023-08-25 | End: 2023-09-08 | Stop reason: SDUPTHER

## 2023-08-25 RX ORDER — HYDROCODONE BITARTRATE AND ACETAMINOPHEN 10; 325 MG/1; MG/1
1 TABLET ORAL EVERY 8 HOURS PRN
Qty: 21 TABLET | Refills: 0 | Status: SHIPPED | OUTPATIENT
Start: 2023-08-25 | End: 2023-09-07 | Stop reason: SDUPTHER

## 2023-08-25 NOTE — TELEPHONE ENCOUNTER
Pain still having pain post op. Will switch up medications to help try and get her some pain relief. She will stop taking gabapentin and start taking lyrica and if no pain relief will then switch back to gabapentin.

## 2023-08-25 NOTE — PROGRESS NOTES
[facility]   Physical Therapy Treatment Note     Name: Ivy Salgado  Clinic Number: [id number]    Therapy Diagnosis:   Encounter Diagnosis   Name Primary?    Decreased range of motion of left shoulder Yes       Physician: Luis Madden DO    Visit Date: 8/25/2023       Evaluation Date: 8/8/2023  Authorization Period Expiration: 7/6/2024  Plan of Care Expiration: 12/31/2023  Progress Note Due: 9/10/2023  Visit # / Visits authorized: 4/ 20  Foto: 1/1      Time In: 0900  Time Out: 1000  Total Billable Time: 60 minutes     DOS: 7/18/2023  S/p: left  1. Total shoulder arthroplasty (complex, -22 modifier)  2. Shoulder lysis of adhesions  3. Shoulder subpectoral biceps tenodesis (CPT 79320)  Post-Op Precautions: We will follow the shoulder arthroplasty guidelines. The patient remained in a sling for 6 weeks. We will start PT at the 3-week martina.         Precautions: Fall    SUBJECTIVE     Pt reports: Did not know I had to wear sling for full 6 weeks.    She was compliant with home exercise program.  Response to previous treatment: improvement in pain at rest   Functional change: able to josefa being out of sling for a little bit     Pain: 5/10  Location: left shoulder      OBJECTIVE     Shoulder Flexion AAROM: 120 ; ER PROM to 20    Treatment       Ivy received the treatments listed below:      Therapeutic exercises to develop ROM for 25 minutes including:  Pulleys 5m   Table slides FL and scaption 3m ea  Elbow flex/ext 20x  Wand chest press into FL 30x  Pt education sling wear  ROM assessment    Manual therapy techniques: Joint mobilizations and Soft tissue Mobilization were applied to the: L shoulder for 15 minutes, including:  Skilled PROM of L shoulder within tolerance and protocol in flexion/ER/IR, elbow flex/ext, wrist sup/pronation   Scar mobs -np    Neuromuscular re-education activities to improve: Posture for 20 minutes. The following activities were included:  Bent over scap  "squeeze   Elbow flexion with dowel for neutral ER focus 30x   AAROM ER supine 30x 3s holds   Wall isometrics FL/ext/abd 10x10" hold        Patient Education and Home Exercises     Home Exercises Provided and Patient Education Provided     Education provided:   - Add new exercises     Written Home Exercises Provided: yes. Exercises were reviewed and Ivy was able to demonstrate them prior to the end of the session.  Ivy demonstrated good  understanding of the education provided. See EMR under Patient Instructions for exercises provided during therapy sessions    ASSESSMENT     Ivy showed to clinic without sling donned. Pt was educated on sling wear for 6 weeks. Ok to come out of sling if she is resting at home or to perform HEP. PROM improved today from previous session. Was able to introduce deltoid isometrics today as well as AAROM FL supine to tolerance.     Ivy Is progressing well towards her goals.     Pt prognosis is Fair.     Pt will continue to benefit from skilled outpatient physical therapy to address the deficits listed in the problem list box on initial evaluation, provide pt/family education and to maximize pt's level of independence in the home and community environment.     Pt's spiritual, cultural and educational needs considered and pt agreeable to plan of care and goals.     Anticipated barriers to physical therapy: none     Goals:   Short Term Goals: 12 weeks  1. Pt will be compliant with HEP 50% of prescribed amount.   2. The pt to demo improvement in R shoulder PROM to by 80% of the L  3.  The pt to demo tolerance to being out of the sling for 24 hours with pain <2/10     Long Term Goals:  36 weeks   Pt will be compliant with % of prescribed amount.   The pt to improvement in AROM of L shoulder within 80% of R shoulder to improve tolerance to [place] movement   The pt to  Demo at least 4+/5 of RTC muscle testing to demo improvement in tolerance to activity  The pt to tolerate lifting " 50# from the ground to waist height per job requirement description   The pt will report full participation in ADLs and IADLs without restrictions related to L Shoulder.      PLAN     Follow TSA procedure     Vidya Lemus, PTA

## 2023-08-27 DIAGNOSIS — F41.1 GENERALIZED ANXIETY DISORDER: ICD-10-CM

## 2023-08-28 ENCOUNTER — CLINICAL SUPPORT (OUTPATIENT)
Dept: REHABILITATION | Facility: HOSPITAL | Age: 43
End: 2023-08-28
Payer: COMMERCIAL

## 2023-08-28 DIAGNOSIS — M25.612 DECREASED RANGE OF MOTION OF LEFT SHOULDER: Primary | ICD-10-CM

## 2023-08-28 PROCEDURE — 97140 MANUAL THERAPY 1/> REGIONS: CPT | Mod: CQ

## 2023-08-28 PROCEDURE — 97110 THERAPEUTIC EXERCISES: CPT | Mod: CQ

## 2023-08-28 PROCEDURE — 97112 NEUROMUSCULAR REEDUCATION: CPT | Mod: CQ

## 2023-08-28 RX ORDER — CLONAZEPAM 1 MG/1
TABLET ORAL
Qty: 20 TABLET | Refills: 0 | Status: SHIPPED | OUTPATIENT
Start: 2023-08-28 | End: 2023-09-08 | Stop reason: SDUPTHER

## 2023-08-28 RX ORDER — VALACYCLOVIR HYDROCHLORIDE 1 G/1
TABLET, FILM COATED ORAL
Qty: 10 TABLET | Refills: 3 | Status: SHIPPED | OUTPATIENT
Start: 2023-08-28 | End: 2023-09-22 | Stop reason: SDUPTHER

## 2023-08-28 NOTE — PROGRESS NOTES
[facility]   Physical Therapy Treatment Note     Name: Ivy Salgado  Clinic Number: [id number]    Therapy Diagnosis:   Encounter Diagnosis   Name Primary?    Decreased range of motion of left shoulder Yes         Physician: Luis Madden DO    Visit Date: 8/28/2023       Evaluation Date: 8/8/2023  Authorization Period Expiration: 7/6/2024  Plan of Care Expiration: 12/31/2023  Progress Note Due: 9/10/2023  Visit # / Visits authorized: 5/ 20  Foto: 1/1      Time In: 11:05  Time Out: 12:00   Total Billable Time: 55 minutes     DOS: 7/18/2023  S/p: left  1. Total shoulder arthroplasty (complex, -22 modifier)  2. Shoulder lysis of adhesions  3. Shoulder subpectoral biceps tenodesis (CPT 48499)  Post-Op Precautions: We will follow the shoulder arthroplasty guidelines. The patient remained in a sling for 6 weeks. We will start PT at the 3-week martina.         Precautions: Fall    SUBJECTIVE     Pt reports: no new complaints    She was compliant with home exercise program.  Response to previous treatment: improvement in pain at rest   Functional change: able to josefa being out of sling for a little bit     Pain: 5/10  Location: left shoulder      OBJECTIVE     Shoulder Flexion AAROM: 120 ; ER PROM to 20-NT    Treatment       Ivy received the treatments listed below:      Therapeutic exercises to develop ROM for 17 minutes including:  Pulleys 5m   Table slides FL and scaption 3m ea-np  Elbow flex/ext 20x  Wand chest press into FL 30x  ROM assessment    Manual therapy techniques: Joint mobilizations and Soft tissue Mobilization were applied to the: L shoulder for 15 minutes, including:  Skilled PROM of L shoulder within tolerance and protocol in flexion/ER/IR, elbow flex/ext, wrist sup/pronation   Scar mobs -np    Neuromuscular re-education activities to improve: Posture for 23 minutes. The following activities were included:  Bent over scap squeeze 30x  Elbow flexion with dowel for neutral  "ER focus 30x   AAROM ER supine 30x 3s holds   Wall isometrics FL/ext/abd 10x10" hold        Patient Education and Home Exercises     Home Exercises Provided and Patient Education Provided     Education provided:   - Add new exercises     Written Home Exercises Provided: yes. Exercises were reviewed and Ivy was able to demonstrate them prior to the end of the session.  Ivy demonstrated good  understanding of the education provided. See EMR under Patient Instructions for exercises provided during therapy sessions    ASSESSMENT     Ivy was told she can D/C brace as per PT and PA. Challenged with scap retractions bent over to prevent UT activation as well as not extending UE. Cueing needed to prevent pushing body into wall with isometrics.     Ivy Is progressing well towards her goals.     Pt prognosis is Fair.     Pt will continue to benefit from skilled outpatient physical therapy to address the deficits listed in the problem list box on initial evaluation, provide pt/family education and to maximize pt's level of independence in the home and community environment.     Pt's spiritual, cultural and educational needs considered and pt agreeable to plan of care and goals.     Anticipated barriers to physical therapy: none     Goals:   Short Term Goals: 12 weeks  1. Pt will be compliant with HEP 50% of prescribed amount.   2. The pt to demo improvement in R shoulder PROM to by 80% of the L  3.  The pt to demo tolerance to being out of the sling for 24 hours with pain <2/10     Long Term Goals:  36 weeks   Pt will be compliant with % of prescribed amount.   The pt to improvement in AROM of L shoulder within 80% of R shoulder to improve tolerance to [place] movement   The pt to  Demo at least 4+/5 of RTC muscle testing to demo improvement in tolerance to activity  The pt to tolerate lifting 50# from the ground to waist height per job requirement description   The pt will report full participation in ADLs and " IADLs without restrictions related to L Shoulder.      PLAN     Follow TSA procedure     Vidya Lemus, PTA

## 2023-08-29 ENCOUNTER — PATIENT MESSAGE (OUTPATIENT)
Dept: INTERNAL MEDICINE | Facility: CLINIC | Age: 43
End: 2023-08-29
Payer: COMMERCIAL

## 2023-08-30 ENCOUNTER — HOSPITAL ENCOUNTER (OUTPATIENT)
Dept: RADIOLOGY | Facility: HOSPITAL | Age: 43
Discharge: HOME | End: 2023-08-30
Attending: ORTHOPAEDIC SURGERY
Payer: COMMERCIAL

## 2023-08-30 ENCOUNTER — OFFICE VISIT (OUTPATIENT)
Dept: PRIMARY CARE CLINIC | Facility: CLINIC | Age: 43
End: 2023-08-30
Payer: COMMERCIAL

## 2023-08-30 ENCOUNTER — OFFICE VISIT (OUTPATIENT)
Dept: SPORTS MEDICINE | Facility: CLINIC | Age: 43
End: 2023-08-30
Payer: COMMERCIAL

## 2023-08-30 VITALS
WEIGHT: 105.81 LBS | SYSTOLIC BLOOD PRESSURE: 126 MMHG | HEART RATE: 69 BPM | HEIGHT: 61 IN | BODY MASS INDEX: 19.98 KG/M2 | DIASTOLIC BLOOD PRESSURE: 84 MMHG

## 2023-08-30 VITALS
TEMPERATURE: 98 F | SYSTOLIC BLOOD PRESSURE: 124 MMHG | DIASTOLIC BLOOD PRESSURE: 82 MMHG | WEIGHT: 103.31 LBS | HEART RATE: 81 BPM | OXYGEN SATURATION: 96 % | BODY MASS INDEX: 19.5 KG/M2 | HEIGHT: 61 IN | RESPIRATION RATE: 18 BRPM

## 2023-08-30 DIAGNOSIS — M25.512 LEFT SHOULDER PAIN, UNSPECIFIED CHRONICITY: Primary | ICD-10-CM

## 2023-08-30 DIAGNOSIS — M85.89 OSTEOPENIA OF MULTIPLE SITES: ICD-10-CM

## 2023-08-30 DIAGNOSIS — Z76.89 ENCOUNTER TO ESTABLISH CARE: Primary | ICD-10-CM

## 2023-08-30 DIAGNOSIS — K50.019 CROHN'S DISEASE OF SMALL INTESTINE WITH COMPLICATION: ICD-10-CM

## 2023-08-30 DIAGNOSIS — M25.512 LEFT SHOULDER PAIN, UNSPECIFIED CHRONICITY: ICD-10-CM

## 2023-08-30 DIAGNOSIS — F41.1 GENERALIZED ANXIETY DISORDER: ICD-10-CM

## 2023-08-30 DIAGNOSIS — Z98.890 S/P SHOULDER SURGERY: ICD-10-CM

## 2023-08-30 PROBLEM — R29.818 TRANSIENT NEUROLOGICAL SYMPTOMS: Status: RESOLVED | Noted: 2019-01-13 | Resolved: 2023-08-30

## 2023-08-30 PROBLEM — R00.2 PALPITATIONS: Status: RESOLVED | Noted: 2018-08-30 | Resolved: 2023-08-30

## 2023-08-30 PROBLEM — G40.219 COMPLEX PARTIAL EPILEPSY WITH GENERALIZATION AND WITH INTRACTABLE EPILEPSY: Status: RESOLVED | Noted: 2020-03-11 | Resolved: 2023-08-30

## 2023-08-30 PROBLEM — F11.90 CHRONIC, CONTINUOUS USE OF OPIOIDS: Status: RESOLVED | Noted: 2019-05-28 | Resolved: 2023-08-30

## 2023-08-30 PROBLEM — R06.02 SOBOE (SHORTNESS OF BREATH ON EXERTION): Status: RESOLVED | Noted: 2019-05-28 | Resolved: 2023-08-30

## 2023-08-30 PROBLEM — F07.81 POST CONCUSSION SYNDROME: Status: RESOLVED | Noted: 2023-03-13 | Resolved: 2023-08-30

## 2023-08-30 PROBLEM — F32.0 CURRENT MILD EPISODE OF MAJOR DEPRESSIVE DISORDER WITHOUT PRIOR EPISODE: Status: RESOLVED | Noted: 2017-08-05 | Resolved: 2023-08-30

## 2023-08-30 PROBLEM — R00.0 SINUS TACHYCARDIA: Status: RESOLVED | Noted: 2018-09-02 | Resolved: 2023-08-30

## 2023-08-30 PROCEDURE — 99499 UNLISTED E&M SERVICE: CPT | Mod: S$GLB,,, | Performed by: INTERNAL MEDICINE

## 2023-08-30 PROCEDURE — 99999 PR PBB SHADOW E&M-EST. PATIENT-LVL IV: ICD-10-PCS | Mod: PBBFAC,,, | Performed by: ORTHOPAEDIC SURGERY

## 2023-08-30 PROCEDURE — 3079F DIAST BP 80-89 MM HG: CPT | Mod: CPTII,S$GLB,, | Performed by: ORTHOPAEDIC SURGERY

## 2023-08-30 PROCEDURE — 73030 X-RAY EXAM OF SHOULDER: CPT | Mod: TC,LT

## 2023-08-30 PROCEDURE — 3008F PR BODY MASS INDEX (BMI) DOCUMENTED: ICD-10-PCS | Mod: CPTII,S$GLB,, | Performed by: ORTHOPAEDIC SURGERY

## 2023-08-30 PROCEDURE — 99024 PR POST-OP FOLLOW-UP VISIT: ICD-10-PCS | Mod: S$GLB,,, | Performed by: ORTHOPAEDIC SURGERY

## 2023-08-30 PROCEDURE — 3074F SYST BP LT 130 MM HG: CPT | Mod: CPTII,S$GLB,, | Performed by: ORTHOPAEDIC SURGERY

## 2023-08-30 PROCEDURE — 1159F MED LIST DOCD IN RCRD: CPT | Mod: CPTII,S$GLB,, | Performed by: ORTHOPAEDIC SURGERY

## 2023-08-30 PROCEDURE — 73030 X-RAY EXAM OF SHOULDER: CPT | Mod: 26,LT,, | Performed by: RADIOLOGY

## 2023-08-30 PROCEDURE — 99499 NO LOS: ICD-10-PCS | Mod: S$GLB,,, | Performed by: INTERNAL MEDICINE

## 2023-08-30 PROCEDURE — 3079F PR MOST RECENT DIASTOLIC BLOOD PRESSURE 80-89 MM HG: ICD-10-PCS | Mod: CPTII,S$GLB,, | Performed by: ORTHOPAEDIC SURGERY

## 2023-08-30 PROCEDURE — 3074F PR MOST RECENT SYSTOLIC BLOOD PRESSURE < 130 MM HG: ICD-10-PCS | Mod: CPTII,S$GLB,, | Performed by: ORTHOPAEDIC SURGERY

## 2023-08-30 PROCEDURE — 73030 XR SHOULDER COMPLETE 2 OR MORE VIEWS LEFT: ICD-10-PCS | Mod: 26,LT,, | Performed by: RADIOLOGY

## 2023-08-30 PROCEDURE — 99999 PR PBB SHADOW E&M-EST. PATIENT-LVL IV: CPT | Mod: PBBFAC,,, | Performed by: ORTHOPAEDIC SURGERY

## 2023-08-30 PROCEDURE — 99024 POSTOP FOLLOW-UP VISIT: CPT | Mod: S$GLB,,, | Performed by: ORTHOPAEDIC SURGERY

## 2023-08-30 PROCEDURE — 1159F PR MEDICATION LIST DOCUMENTED IN MEDICAL RECORD: ICD-10-PCS | Mod: CPTII,S$GLB,, | Performed by: ORTHOPAEDIC SURGERY

## 2023-08-30 PROCEDURE — 3008F BODY MASS INDEX DOCD: CPT | Mod: CPTII,S$GLB,, | Performed by: ORTHOPAEDIC SURGERY

## 2023-08-30 NOTE — PROGRESS NOTES
43-year-old woman with a history of Crohn's disease (osteopenia/osteoporosis, status post recent left shoulder replacement, ostomy placement, nephrolithiasis), anxiety and depression in part secondary to Crohn's, disease of the heart (hypertension prolonged QT syndrome), kidney stones status post lithotripsy, recurrent UTI, status post hysterectomy and oophorectomy in menopause, history of melanoma x2 here to establish    Pertinent review of systems:  Encounter to establish:  The patient is physician recently left the patient is an individual with complex medical problems.  She needs a physician help coordinate her care as well as referrals for psychiatric management including medication as well as therapy.  Her chronic medical problems of hypertension and prolonged QT of been managed by Cardiology and have been well-controlled.  The patient relates these to 1 of the immune modulator drugs that she received.  Other medical problems include kidney stones which is related to her Crohn's disease and frequent UTIs.  She is in menopause in his receiving medication for this having undergone a total abdominal hysterectomy and BSO.  She has skin checks twice a year because of her history of melanoma.    Crohn's disease:  Patient was diagnosed at age 10.  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  Patient had an ileostomy placed at age 32.  This has been doing well in his not been revised since it was originally placed.  The patient sees GI.  She has an endoscopy pending.  She is currently on Entyvio.  She is also on Marinol in mirtazapine to stimulate her appetite and maintain her weight.  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  She has osteopenia and likely osteoporosis secondary to many years of chronic steroids.   The patient has had kidney stones due to oxalate secondary to the placement of her ileostomy.  She is had issues with the absorption of vitamins including vitamin-D and B12.  Finally her condition is led to his state of anxiety and depression.    Anxiety and depression:  The patient has never been hospitalized.  She has been on multiple benzodiazepines and currently is on high dose Klonopin 2 mg twice daily.  This is in addition to her mirtazapine for both anxiety and depression.  She actually takes her Klonopin 1 tablet 3 to 4 times a week.  Her dose was reduced to 1 mg tablet by a previous physician.  Her depression as she describes is more likely reactive to her Crohn's disease.  She currently is not suicidal, continues to enjoy her life, and likes herself.  She knows she needs Psychiatry for medication management and she understands the possible consequences of long-term high dose Klonopin and possibly SSRIs.  Other issues for the patient includes the fact that her daughter has developed Crohn's which is creating greater stress for the patient    Cardiac disease:  The patient developed hypertension.  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  The patient then began to start other medications including a different immunotherapy agent and she developed prolonged QT. she was treated with nadolol a beta-blocker and her blood pressure also came down.  The patient did undergo a cardiac workup.  She has no history of a heart murmur.  She has no symptoms of angina or atypical chest pain radiating into her back, symptoms of CHF or change in exercise tolerance, morning edema, palpitations, unexplained diaphoresis.  She has no morning headache, symptoms of TIA, syncope or lightheadedness.  She has no symptoms of claudication.  Though she is had a kidney stone she has no symptoms of chronic kidney disease including foaming or frothing of her urine, changes in her urine volume or pattern.     Reviewing the chart the patient's last EKG was [id number].  She would a sinus tachycardia with by atrial enlargement and nonspecific STT wave changes.  QT interval was slightly prolonged.  Cardiac event monitor in 2018 demonstrated sinus rhythm.  When the patient did trigger evaluation her maximum heart rate was 137 but remained in sinus rhythm.  Echocardiogram also done in 2018 demonstrated normal LV function of both the left and right ventricle.  Normal diastolic function.  And a PA systolic pressure of 22.  No valvular abnormality was seen.      Recurrent UTI:  The patient had recurrent use TIAs since a child.  They began with what was thought to be a colonic bladder fistula.  She is also thought to have urethral reflux.  The patient in the past has had episodes of retention and at 1 point with self cathing herself.  She currently is not on prophylaxis but has been on prophylaxis in the past.  She has no current symptoms of UTI.  The patient is sexually active.    Past medical history:  Medical:  The patient has had multiple medical hospitalizations for Crohn's disease as well as kidney stones.  Surgical:  The patient has undergone multiple surgeries related to Crohn's including the ileostomy.  Recently she had her left shoulder replaced for avascular necrosis of the left shoulder.  She is had 2 melanomas excised 1 on her abdominal wall and 1 her left buttock.  She is had a port placed on her left because of difficult venous access in the fact that she may require on a frequent basis IV fluids due to dehydration as a consequence of her ileostomy.  Trauma:  The patient had a fracture of her left collar bone.    Psych:  She is been seen by a psychologist and a psychiatrist in the past she is currently interested in renewing a relationship with a therapist and a psychiatrist for medication management.  OBGYN: Menses began at age 15.  The patient has had 1 full-term child, no premature births, 1 live birth, and 1  miscarriage.  The patient is status post hysterectomy and oophorectomy.  She had an abnormal Pap smear in the past and did have a conization.  The other issues related to her hysterectomy and oophorectomy was menometrorrhagia and ovarian cysts.  Her last mammogram was in June of 2023.  Her bone density exam was also in June 2023.    Prevention:  The patient has no known: Therefore does not require colonoscopy.  The patient has had her COVID vaccine, flu vaccine, and hepatitis-B and a vaccines.  The patient also because of her immune status has had the pneumonia vaccine and the shingles vaccine.  Her Tdap is current.    Family history:  Colon cancer:  Father   Crohn's disease: Brother   Hypertension:  Maternal and paternal grandparents  Diabetes mellitus:  Maternal and paternal grandparents.    Esophageal cancer:  Paternal grandmother   Dementia:  Paternal grandfather.    Social history:  Tobacco:  The patient does not smoke   Alcohol:  The patient does not drink   Recreational drugs:  Patient uses no recreational drugs   Bowels:  The patient has an ileostomy.    Diet:  The patient eats 2 meals a day and has 1 caffeinated beverage a day.    Sleep:  The patient sleep recently has been poor secondary to her shoulder surgery.  Prior to her shoulder surgery she slept approximally 8 hours  Exercise:  The patient tries to exercise 4 times a week.  Her exercise includes swimming and going to the gym.  Social circumstances:  The patient is  with 1 daughter who lives at home.  The patient believes her divorce was related to her 's problems with dealing with Crohn's disease.  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  She currently enjoys spending time with her daughter, exercise, hanging with friends, and going on dates.    Allergies:  Patient has multiple drug intolerances which include azo Zantac Rinne, methotrexate, Stelara, vancomycin, morphine, Bactrim, Cipro, and Zofran.    Review of systems:   A 12 system review was done.  Review of systems was remarkable for the melanomas as noted and the patient has a derm check at least twice a year.  The last melanoma was 3 years ago.  The patient has a history of thyroid nodules but she is never had a biopsy.  She is no current symptoms of thyroid disease.  The remaining review of systems is negative.    Physical Exam  Vitals reviewed.   Constitutional:       General: She is not in acute distress.     Appearance: Normal appearance. She is not ill-appearing.   HENT:      Head: Atraumatic.      Right Ear: Tympanic membrane and ear canal normal.      Left Ear: Tympanic membrane and ear canal normal.      Nose: Nose normal. No congestion or rhinorrhea.      Mouth/Throat:      Mouth: Mucous membranes are moist.      Pharynx: Oropharynx is clear. No oropharyngeal exudate or posterior oropharyngeal erythema.   Eyes:      General: No scleral icterus.     Extraocular Movements: Extraocular movements intact.      Conjunctiva/sclera: Conjunctivae normal.      Pupils: Pupils are equal, round, and reactive to light.   Neck:      Vascular: No carotid bruit.      Comments: Thyroid is without enlargement, palpable nodules, or bruit.  Cardiovascular:      Rate and Rhythm: Normal rate and regular rhythm.      Pulses: Normal pulses.      Heart sounds: Normal heart sounds. No murmur heard.     No gallop.   Pulmonary:      Effort: Pulmonary effort is normal. No respiratory distress.      Breath sounds: Normal breath sounds. No wheezing, rhonchi or rales.   Abdominal:      General: Bowel sounds are normal.      Palpations: Abdomen is soft.      Tenderness: There is no abdominal tenderness. There is no right CVA tenderness or left CVA tenderness.      Comments: No hepatosplenomegaly.  Mid abdomen is an ileostomy with bag   Musculoskeletal:         General: Signs of injury present. No swelling or tenderness. Normal range of motion.      Cervical back: Neck supple. No tenderness.      Right  lower leg: No edema.      Left lower leg: No edema.      Comments: Patient's left shoulder is in a sling device secondary to a recent shoulder replacement   Lymphadenopathy:      Cervical: No cervical adenopathy.   Skin:     General: Skin is warm.      Coloration: Skin is not jaundiced or pale.      Findings: No rash.   Neurological:      General: No focal deficit present.      Mental Status: She is alert and oriented to person, place, and time.   Psychiatric:         Mood and Affect: Mood normal.         Behavior: Behavior normal.    Assessment/plan:  Encounter to establish:  The patient is here today to establish for management of her complex care as noted.  I share her concerns related to high dose benzodiazepines (Klonopin) and I do not believe that her mirtazapine has been effective as she needs.  She needs improved regimen to control her anxiety and secondary depression.  I have discussed with the patient getting labs after next appointment as they were most recently done in July.  The laboratory at that time revealed a normal C-reactive protein CMP, and CBC  Plan:  Reviewed with the patient above.  Laboratory next month including B12, folate, and magnesium in addition to her normal lab.    Crohn's disease:  The patient has had multiple complications of Crohn's.  Most of these have been surgical and related exit primary bowel disease.  The nephrolithiasis is related to the ileostomy which tends to cause oxalate stones.  She has not had any liver disease or rashes associated with Crohn's.  She has had complications associated was long-term use of steroids including avascular necrosis of her shoulder and osteopenia.  Emotionally the patient has suffered from her Crohn's both from the anxiety of having this disease and describing what I term waiting for the next problem to occur or anticipation anxiety with reactive depression.  Also the recent diagnosis of her daughter with this condition obviously has her  worried for her child.  In many ways the patient has adapted to her disease in terms of relationships with friends and dating as we discussed in the office.  Plan:  Reviewed above with patient   The patient will continue to follow with GI.    Patient needs to make sure she consumes enough fluids to match her ostomy +20-30 oz  Labs should be done at her next visit which would include ferritin, B12, folate, and magnesium both because she is on a PPI and due to her Crohn's disease as well as routine labs for her Crohn's disease and for her treatment which I note includes a QuantiFERON because of the risk of TB.    Anxiety/depression:  This is been discussed above.  Most of her symptoms are related to anxiety and the depression is a reactive 1 due to her condition.  She is on medications but I do have a concern regarding the high dose use of benzodiazepines as well as the long-term use of SSRIs though for the most part these have been shown to be safe.  The patient is complex issues do require medication management until she is stable by a psychiatrist and then they could be managed by myself as well as a therapist to help her better deal with the anxiety of her disease and the recent acquisition of Crohn's by her daughter.  Plan:  I have reviewed her case with the psychologist and have referred her to Psychiatry.    I would like to try to reduce or Klonopin and possibly substitute a safer anti anxiety drug such as BusPar  I also discussed the possibility of switching from her mirtazapine to Lexapro or possibly Wellbutrin.      Cardiac disease:  Patient has a history of prolonged QT interval which the patient believes is related to her immune modulator.  She also has a history of mild hypertension both of which are now being controlled on a beta-blocker.  She is currently asymptomatic and I did review both her EKG and her echocardiogram.  Her cardiac exam today is negative.  Plan: I reviewed above with patient   This  time the patient does not need further follow-up with Cardiology and I will assume prescribing her medication.      Recurrent UTI:  The patient is under the care of a urologist.  She is on no prophylaxis but she tells me that the urologist is considering this.  The urologist has not seen her for quite some time.  I do note that she was treated empirically for UTIs without culture.  Plan:  Reviewed above with patient   Recommend that no UTI be treated without a culture 1st.    Consider cranberry capsules or crack aid use daily  Also consider Macrodantin prophylaxis.    Diarrhea of symptom and determine there is a pattern or relationship especially to intercourse where an antibiotic can be given prior.    Osteopenia:  Reviewed home BMD done January of this year.  FRAX score did not recommend treatment for osteoporosis.  It is interesting to note compared to the prior study her BMD demonstrated increase in bone and hip.  Plan:  Patient and I reviewed this information as well as data on vitamin-D.    Vitamin D3 1000 IU daily   Consider calcium but no more than 500 mg per day  Patient should continue on her estrogen replacement which will also reduce bone loss.

## 2023-08-30 NOTE — PATIENT INSTRUCTIONS
Thank you for coming to visit today  I will be reviewing your chart and I may be calling you with additional questions.  Please be advised in the evening or on weekends my phone number comes up as no caller ID.  During the day my phone number typically specially in the morning comes up as [facility].  If you do have an acute problem please reach out to me

## 2023-08-30 NOTE — PROGRESS NOTES
Chief Complaint: left shoulder pain    HISTORY OF PRESENT ILLNESS:   Pt is here today for post-operative followup of the below listed surgery.  she is doing well.  We have reviewed her findings and discussed plan of care and future treatment options.                                 Patient is wearing sling which is in place. She is with her father today.     Patient has been attending physical therapy at the [facility] location, working with Glory BAEZA and Vidya.  She describes nerve pain from her elbow to her wrist, describes on her radial side   She describes her pain as severe   She has been taking Norco for her pain     Pain today 6/10    DATE OF PROCEDURE: 07/18/2023  SURGEON: Sharon Babb M.D.   PROCEDURE PERFORMED:   left  1. Total shoulder arthroplasty (complex, -22 modifier)  2. Shoulder lysis of adhesions  3. Shoulder subpectoral biceps tenodesis (CPT 62020)                                                  PHYSICAL EXAMINATION:     Incision sites healed well  No evidence of any erythema, infection or induration  elbow Range of motion full   No effusion  2+ DP pulse  No swelling    ROM:      Forward Elevation: 55  ER: 10  IR: L5      RADIOGRAPHS reviewed and interpreted by myself:  2 views.  Postoperative changes are again identified relating to a prior left shoulder arthroplasty procedure, prostheses appearing unremarkable.  No evidence of recent fracture, lytic destructive process, development of abnormal periprosthetic lucency, or other significant detrimental change since 08/11/2023 is appreciated.  Vascular access catheter with its tip in the superior vena cava again incidentally observed.                                                                            ASSESSMENT:                                                                                                                                               1. Status post above, doing well.                                                                                                                                PLAN:                                                                                                                                                     1. Continue PT, case discussed with therapist.   2. Emphasized scapular function.  3. I have discussed return to activity in detail, patient will continue to be out of work for another 6 weeks with the anticipation of returning to desk duties only at that time. (Desk duties, no reaching above shoulder level, no pushing, no pulling, no lifting, and no carrying over 2-3 lbs)  4. she will see us back in 3 months.                                      5. All questions were answered, surgical technique was reviewed and interpreted, and patient should contact us with any questions or concerns in the interim

## 2023-08-31 ENCOUNTER — PATIENT MESSAGE (OUTPATIENT)
Dept: PRIMARY CARE CLINIC | Facility: CLINIC | Age: 43
End: 2023-08-31
Payer: COMMERCIAL

## 2023-08-31 ENCOUNTER — CLINICAL SUPPORT (OUTPATIENT)
Dept: REHABILITATION | Facility: HOSPITAL | Age: 43
End: 2023-08-31
Payer: COMMERCIAL

## 2023-08-31 DIAGNOSIS — M25.612 DECREASED RANGE OF MOTION OF LEFT SHOULDER: Primary | ICD-10-CM

## 2023-08-31 PROCEDURE — 97140 MANUAL THERAPY 1/> REGIONS: CPT

## 2023-08-31 PROCEDURE — 97110 THERAPEUTIC EXERCISES: CPT

## 2023-08-31 PROCEDURE — 97112 NEUROMUSCULAR REEDUCATION: CPT

## 2023-08-31 NOTE — PROGRESS NOTES
[facility]   Physical Therapy Treatment Note     Name: Ivy Salgado  Clinic Number: [id number]    Therapy Diagnosis:   No diagnosis found.        Physician: Luis Madden DO    Visit Date: 8/31/2023       Evaluation Date: 8/8/2023  Authorization Period Expiration: 7/6/2024  Plan of Care Expiration: 12/31/2023  Progress Note Due: 9/10/2023  Visit # / Visits authorized: 6/ 20  Foto: 1/1      Time In: 1:00 pm  Time Out: 2:00 pm   Total Billable Time: 60 minutes     DOS: 7/18/2023  S/p: left  1. Total shoulder arthroplasty (complex, -22 modifier)  2. Shoulder lysis of adhesions  3. Shoulder subpectoral biceps tenodesis (CPT 58578)  Post-Op Precautions: We will follow the shoulder arthroplasty guidelines. The patient remained in a sling for 6 weeks. We will start PT at the 3-week martina.         Precautions: Fall    SUBJECTIVE     Pt reports: she is doing OK, her shoulder is sore but she is doing her HEP.     She was compliant with home exercise program.  Response to previous treatment: improvement in pain at rest   Functional change: able to josefa being out of sling for a little bit     Pain: 5/10  Location: left shoulder      OBJECTIVE     Shoulder Flexion AAROM: 120 ; ER PROM to 20    Treatment       Ivy received the treatments listed below:      Therapeutic exercises to develop ROM for 17 minutes including:  Pulleys 5m   Table slides FL and scaption 3m ea-np  Elbow flex/ext 20x  Wand chest press into FL 30x  ROM assessment    Manual therapy techniques: Joint mobilizations and Soft tissue Mobilization were applied to the: L shoulder for 15 minutes, including:  Skilled PROM of L shoulder within tolerance and protocol in flexion/ER/IR, elbow flex/ext, wrist sup/pronation   Scar mobs -np    Neuromuscular re-education activities to improve: Posture for 23 minutes. The following activities were included:  Elbow flexion with dowel for neutral ER focus 30x   AAROM ER supine 30x 3s holds  "  Wall isometrics FL/ext/abd 10x10" hold        Patient Education and Home Exercises     Home Exercises Provided and Patient Education Provided     Education provided:   - Add new exercises     Written Home Exercises Provided: yes. Exercises were reviewed and Ivy was able to demonstrate them prior to the end of the session.  Ivy demonstrated good  understanding of the education provided. See EMR under Patient Instructions for exercises provided during therapy sessions    ASSESSMENT     The patient with good tolerance to therex, fatigue noted after isometrics for RTC activation, required cueing to improve technique. Overall doing well.     Ivy Is progressing well towards her goals.     Pt prognosis is Fair.     Pt will continue to benefit from skilled outpatient physical therapy to address the deficits listed in the problem list box on initial evaluation, provide pt/family education and to maximize pt's level of independence in the home and community environment.     Pt's spiritual, cultural and educational needs considered and pt agreeable to plan of care and goals.     Anticipated barriers to physical therapy: none     Goals:   Short Term Goals: 12 weeks  1. Pt will be compliant with HEP 50% of prescribed amount.   2. The pt to demo improvement in R shoulder PROM to by 80% of the L  3.  The pt to demo tolerance to being out of the sling for 24 hours with pain <2/10     Long Term Goals:  36 weeks   Pt will be compliant with % of prescribed amount.   The pt to improvement in AROM of L shoulder within 80% of R shoulder to improve tolerance to OH movement   The pt to  Demo at least 4+/5 of RTC muscle testing to demo improvement in tolerance to activity  The pt to tolerate lifting 50# from the ground to waist height per job requirement description   The pt will report full participation in ADLs and IADLs without restrictions related to L Shoulder.      PLAN     Follow TSA procedure     Dorothy Basurto, " PT

## 2023-09-01 NOTE — DISCHARGE INSTRUCTIONS
Taking medication as prescribed.  Follow up with your primary care doctor.  Return to the ED for any worsening of symptoms or any other concerns.  
weight-bearing as tolerated

## 2023-09-05 ENCOUNTER — CLINICAL SUPPORT (OUTPATIENT)
Dept: REHABILITATION | Facility: HOSPITAL | Age: 43
End: 2023-09-05
Payer: COMMERCIAL

## 2023-09-05 DIAGNOSIS — M25.612 DECREASED RANGE OF MOTION OF LEFT SHOULDER: Primary | ICD-10-CM

## 2023-09-05 PROCEDURE — 97112 NEUROMUSCULAR REEDUCATION: CPT

## 2023-09-05 PROCEDURE — 97140 MANUAL THERAPY 1/> REGIONS: CPT

## 2023-09-05 PROCEDURE — 97110 THERAPEUTIC EXERCISES: CPT

## 2023-09-05 NOTE — PROGRESS NOTES
[facility]   Physical Therapy Treatment Note     Name: Ivy Salgado  Clinic Number: [id number]    Therapy Diagnosis:   Encounter Diagnosis   Name Primary?    Decreased range of motion of left shoulder Yes           Physician: Luis Madden DO    Visit Date: 9/5/2023       Evaluation Date: 8/8/2023  Authorization Period Expiration: 7/6/2024  Plan of Care Expiration: 12/31/2023  Progress Note Due: 9/10/2023  Visit # / Visits authorized: 7/ 20  Foto: 1/1      Time In: 1:00 PM  Time Out: 2:00 pm   Total Billable Time: 30 minutes     DOS: 7/18/2023  S/p: left  1. Total shoulder arthroplasty (complex, -22 modifier)  2. Shoulder lysis of adhesions  3. Shoulder subpectoral biceps tenodesis (CPT 52257)  Post-Op Precautions: We will follow the shoulder arthroplasty guidelines. The patient remained in a sling for 6 weeks. We will start PT at the 3-week martina.         Precautions: Fall    SUBJECTIVE     Pt reports: doing OK.     She was compliant with home exercise program.  Response to previous treatment: improvement in pain at rest   Functional change: able to josefa being out of sling for a little bit     Pain: 3/10  Location: left shoulder      OBJECTIVE     Shoulder Flexion AAROM: 120 ; ER PROM to 20    Treatment       Ivy received the treatments listed below:      Therapeutic exercises to develop ROM for 17 minutes including:  Pulleys 5m   Wand chest press into FL 15x  ROM assessment    Manual therapy techniques: Joint mobilizations and Soft tissue Mobilization were applied to the: L shoulder for 15 minutes, including:  Skilled PROM of L shoulder within tolerance and protocol in flexion/ER/IR, elbow flex/ext, wrist sup/pronation    Neuromuscular re-education activities to improve: Posture for 23 minutes. The following activities were included:  Elbow flexion with dowel for neutral ER focus 30x   AAROM ER supine 15x 3s holds   AAROM Flexion table elevated 30x   Wall slides 10x          Patient Education and Home Exercises     Home Exercises Provided and Patient Education Provided     Education provided:   - Add new exercises     Written Home Exercises Provided: yes. Exercises were reviewed and Ivy was able to demonstrate them prior to the end of the session.  Ivy demonstrated good  understanding of the education provided. See EMR under Patient Instructions for exercises provided during therapy sessions    ASSESSMENT     The patient with good tolerance to progression fo exercises to improve AROM of shoulder. Cont to focus on ER as this is extremely limited and impacting her ability to progress flexion.     Ivy Is progressing well towards her goals.     Pt prognosis is Fair.     Pt will continue to benefit from skilled outpatient physical therapy to address the deficits listed in the problem list box on initial evaluation, provide pt/family education and to maximize pt's level of independence in the home and community environment.     Pt's spiritual, cultural and educational needs considered and pt agreeable to plan of care and goals.     Anticipated barriers to physical therapy: none     Goals:   Short Term Goals: 12 weeks  1. Pt will be compliant with HEP 50% of prescribed amount.   2. The pt to demo improvement in R shoulder PROM to by 80% of the L  3.  The pt to demo tolerance to being out of the sling for 24 hours with pain <2/10     Long Term Goals:  36 weeks   Pt will be compliant with % of prescribed amount.   The pt to improvement in AROM of L shoulder within 80% of R shoulder to improve tolerance to OH movement   The pt to  Demo at least 4+/5 of RTC muscle testing to demo improvement in tolerance to activity  The pt to tolerate lifting 50# from the ground to waist height per job requirement description   The pt will report full participation in ADLs and IADLs without restrictions related to L Shoulder.      PLAN     Follow TSA procedure     Dorothy Basurto, PT

## 2023-09-06 ENCOUNTER — TELEPHONE (OUTPATIENT)
Dept: SPORTS MEDICINE | Facility: CLINIC | Age: 43
End: 2023-09-06
Payer: COMMERCIAL

## 2023-09-06 ENCOUNTER — PATIENT MESSAGE (OUTPATIENT)
Dept: INTERNAL MEDICINE | Facility: CLINIC | Age: 43
End: 2023-09-06
Payer: COMMERCIAL

## 2023-09-06 NOTE — LETTER
Patient: Ivy Salgado   YOB: 1980   Clinic Number: [id number]   Today's Date: September 6, 2023     To:    Fax Number:         Work Status Summary       Diagnosis/ICD-9: M87.019, M25.512, L98.890    Work Status: NOT ABLE to work at present. Estimated release to return to work is 6 weeks with light duty.    Patient had left shoulder surgery on 7/18/23 and is still recovering and requiring PT for another 5-8 months. Still has limitations with her arm.          Next Appointment: f/u with us 4-6 weeks.                September 6, 2023    Mono Giles PA-C  Signed (Provider)

## 2023-09-06 NOTE — LETTER
September 6, 2023        Ivy Salgado             [facility] [facility] [address]  [address]  [place] [address]  Phone: [phone]  Fax: [phone]   Patient: Ivy Salgado   MR Number: [id number]   YOB: 1980   Date of Visit: 9/6/2023       Dear Dr. Salgado:    Thank you for referring Ivy Salgado to me for evaluation. Below are the relevant portions of my assessment and plan of care.            If you have questions, please do not hesitate to call me. I look forward to following Ivy along with you.    Sincerely,      Mono Giles III, MADHAV           CC    No Recipients

## 2023-09-07 ENCOUNTER — PATIENT MESSAGE (OUTPATIENT)
Dept: SPORTS MEDICINE | Facility: CLINIC | Age: 43
End: 2023-09-07
Payer: COMMERCIAL

## 2023-09-07 DIAGNOSIS — Z98.890 S/P SHOULDER SURGERY: ICD-10-CM

## 2023-09-07 DIAGNOSIS — M25.512 CHRONIC LEFT SHOULDER PAIN: ICD-10-CM

## 2023-09-07 DIAGNOSIS — G89.29 CHRONIC LEFT SHOULDER PAIN: ICD-10-CM

## 2023-09-08 ENCOUNTER — CLINICAL SUPPORT (OUTPATIENT)
Dept: REHABILITATION | Facility: HOSPITAL | Age: 43
End: 2023-09-08
Payer: COMMERCIAL

## 2023-09-08 DIAGNOSIS — G89.29 CHRONIC LEFT SHOULDER PAIN: ICD-10-CM

## 2023-09-08 DIAGNOSIS — Z98.890 S/P SHOULDER SURGERY: ICD-10-CM

## 2023-09-08 DIAGNOSIS — F41.1 GENERALIZED ANXIETY DISORDER: ICD-10-CM

## 2023-09-08 DIAGNOSIS — M87.112 AVASCULAR NECROSIS OF LEFT SHOULDER DUE TO ADVERSE EFFECT OF STEROID THERAPY: ICD-10-CM

## 2023-09-08 DIAGNOSIS — M25.512 CHRONIC LEFT SHOULDER PAIN: ICD-10-CM

## 2023-09-08 DIAGNOSIS — M25.612 DECREASED RANGE OF MOTION OF LEFT SHOULDER: Primary | ICD-10-CM

## 2023-09-08 DIAGNOSIS — T38.0X5A AVASCULAR NECROSIS OF LEFT SHOULDER DUE TO ADVERSE EFFECT OF STEROID THERAPY: ICD-10-CM

## 2023-09-08 DIAGNOSIS — M87.019 AVASCULAR NECROSIS OF BONE OF SHOULDER: ICD-10-CM

## 2023-09-08 PROCEDURE — 97140 MANUAL THERAPY 1/> REGIONS: CPT | Mod: CQ

## 2023-09-08 PROCEDURE — 97110 THERAPEUTIC EXERCISES: CPT | Mod: CQ

## 2023-09-08 PROCEDURE — 97112 NEUROMUSCULAR REEDUCATION: CPT | Mod: CQ

## 2023-09-08 RX ORDER — ASPIRIN 81 MG/1
81 TABLET ORAL DAILY
Qty: 42 TABLET | Refills: 0 | Status: ON HOLD | OUTPATIENT
Start: 2023-09-08 | End: 2023-10-31 | Stop reason: HOSPADM

## 2023-09-08 RX ORDER — HYDROCODONE BITARTRATE AND ACETAMINOPHEN 10; 325 MG/1; MG/1
1 TABLET ORAL EVERY 8 HOURS PRN
Qty: 21 TABLET | Refills: 0 | Status: SHIPPED | OUTPATIENT
Start: 2023-09-08 | End: 2023-09-20 | Stop reason: SDUPTHER

## 2023-09-08 RX ORDER — CYCLOBENZAPRINE HCL 10 MG
10 TABLET ORAL NIGHTLY
Qty: 15 TABLET | Refills: 0 | Status: SHIPPED | OUTPATIENT
Start: 2023-09-08 | End: 2023-09-20 | Stop reason: SDUPTHER

## 2023-09-08 NOTE — PROGRESS NOTES
"  [facility]   Physical Therapy Treatment Note     Name: Ivy Salgado  Clinic Number: [id number]    Therapy Diagnosis:   Encounter Diagnosis   Name Primary?    Decreased range of motion of left shoulder Yes           Physician: Luis Madden DO    Visit Date: 9/8/2023       Evaluation Date: 8/8/2023  Authorization Period Expiration: 7/6/2024  Plan of Care Expiration: 12/31/2023  Progress Note Due: 9/10/2023  Visit # / Visits authorized: 8/ 20  Foto: 1/1      Time In: 1000 aM  Time Out: 11:00am   Total Billable Time: 60 minutes     DOS: 7/18/2023  S/p: left  1. Total shoulder arthroplasty (complex, -22 modifier)  2. Shoulder lysis of adhesions  3. Shoulder subpectoral biceps tenodesis (CPT 65886)  Post-Op Precautions: We will follow the shoulder arthroplasty guidelines. The patient remained in a sling for 6 weeks. We will start PT at the 3-week martina.         Precautions: Fall    SUBJECTIVE     Pt reports: no new complaints     She was compliant with home exercise program.  Response to previous treatment: improvement in pain at rest   Functional change: able to josefa being out of sling for a little bit     Pain: 3/10  Location: left shoulder      OBJECTIVE     Shoulder Flexion AAROM: 120 ; ER PROM to 20    Treatment       Ivy received the treatments listed below:      Therapeutic exercises to develop ROM for 20 minutes including:  Pulleys 5m   Wand chest press into FL 30x  ROM assessment  UBE 5/5     Manual therapy techniques: Joint mobilizations and Soft tissue Mobilization were applied to the: L shoulder for 15 minutes, including:  Skilled PROM of L shoulder within tolerance and protocol in flexion/ER/IR, elbow flex/ext, wrist sup/pronation    Neuromuscular re-education activities to improve: Posture for 25 minutes. The following activities were included:  Elbow flexion with dowel for neutral ER focus 30x   AAROM ER supine 3x10 5" holds at 45 and 90  AAROM Flexion table elevated " 30x   Wall slides 10x         Patient Education and Home Exercises     Home Exercises Provided and Patient Education Provided     Education provided:   - Add new exercises     Written Home Exercises Provided: yes. Exercises were reviewed and Ivy was able to demonstrate them prior to the end of the session.  Ivy demonstrated good  understanding of the education provided. See EMR under Patient Instructions for exercises provided during therapy sessions    ASSESSMENT     Pt still limited with ER which is effecting FL ROM. Pt told to perform ER stretching multiple times per day at different angles.     Ivy Is progressing well towards her goals.     Pt prognosis is Fair.     Pt will continue to benefit from skilled outpatient physical therapy to address the deficits listed in the problem list box on initial evaluation, provide pt/family education and to maximize pt's level of independence in the home and community environment.     Pt's spiritual, cultural and educational needs considered and pt agreeable to plan of care and goals.     Anticipated barriers to physical therapy: none     Goals:   Short Term Goals: 12 weeks  1. Pt will be compliant with HEP 50% of prescribed amount.   2. The pt to demo improvement in R shoulder PROM to by 80% of the L  3.  The pt to demo tolerance to being out of the sling for 24 hours with pain <2/10     Long Term Goals:  36 weeks   Pt will be compliant with % of prescribed amount.   The pt to improvement in AROM of L shoulder within 80% of R shoulder to improve tolerance to OH movement   The pt to  Demo at least 4+/5 of RTC muscle testing to demo improvement in tolerance to activity  The pt to tolerate lifting 50# from the ground to waist height per job requirement description   The pt will report full participation in ADLs and IADLs without restrictions related to L Shoulder.      PLAN     Follow TSA procedure     Vidya Lemus, PTA

## 2023-09-08 NOTE — TELEPHONE ENCOUNTER
No care due was identified.  [facility] Embedded Care Due Messages. Reference number: [id number].   9/08/2023 9:39:19 AM CDT

## 2023-09-11 ENCOUNTER — CLINICAL SUPPORT (OUTPATIENT)
Dept: REHABILITATION | Facility: HOSPITAL | Age: 43
End: 2023-09-11
Payer: COMMERCIAL

## 2023-09-11 DIAGNOSIS — M25.612 DECREASED RANGE OF MOTION OF LEFT SHOULDER: Primary | ICD-10-CM

## 2023-09-11 PROCEDURE — 97112 NEUROMUSCULAR REEDUCATION: CPT

## 2023-09-11 PROCEDURE — 97140 MANUAL THERAPY 1/> REGIONS: CPT

## 2023-09-11 PROCEDURE — 97110 THERAPEUTIC EXERCISES: CPT

## 2023-09-11 RX ORDER — CLONAZEPAM 1 MG/1
TABLET ORAL
Qty: 20 TABLET | Refills: 0 | Status: SHIPPED | OUTPATIENT
Start: 2023-09-11 | End: 2023-09-29

## 2023-09-12 NOTE — PROGRESS NOTES
"  [facility]   Physical Therapy Treatment Note     Name: Ivy Salgado  Clinic Number: [id number]    Therapy Diagnosis:   Encounter Diagnosis   Name Primary?    Decreased range of motion of left shoulder Yes           Physician: Luis Madden DO    Visit Date: 9/11/2023       Evaluation Date: 8/8/2023  Authorization Period Expiration: 7/6/2024  Plan of Care Expiration: 12/31/2023  Progress Note Due: 9/10/2023  Visit # / Visits authorized: 8/ 20  Foto: 1/1      Time In: 1300   Time Out: 1400   Total Billable Time: 60 minutes     DOS: 7/18/2023  S/p: left  1. Total shoulder arthroplasty (complex, -22 modifier)  2. Shoulder lysis of adhesions  3. Shoulder subpectoral biceps tenodesis (CPT 96230)  Post-Op Precautions: We will follow the shoulder arthroplasty guidelines. The patient remained in a sling for 6 weeks. We will start PT at the 3-week martina.         Precautions: Fall    SUBJECTIVE     Pt reports: her mid upper arm is very tender still.     She was compliant with home exercise program.  Response to previous treatment: improvement in pain at rest   Functional change: able to josefa being out of sling for a little bit     Pain: 3/10  Location: left shoulder      OBJECTIVE     Shoulder Flexion AAROM: 120 ; ER PROM to 20    Treatment       Ivy received the treatments listed below:      Therapeutic exercises to develop ROM for 20 minutes including:  Pulleys 5m   Wand chest press into FL 30x  ROM assessment  UBE 5/5     Manual therapy techniques: Joint mobilizations and Soft tissue Mobilization were applied to the: L shoulder for 15 minutes, including:  Skilled PROM of L shoulder within tolerance and protocol in flexion/ER/IR, elbow flex/ext, wrist sup/pronation    Neuromuscular re-education activities to improve: Posture for 25 minutes. The following activities were included:  Elbow flexion with dowel for neutral ER focus 30x   AAROM ER supine 3x10 5" holds at 45 and 90  AAROM " Flexion table elevated 30x   Wall slides 10x         Patient Education and Home Exercises     Home Exercises Provided and Patient Education Provided     Education provided:   - Add new exercises     Written Home Exercises Provided: yes. Exercises were reviewed and Ivy was able to demonstrate them prior to the end of the session.  Ivy demonstrated good  understanding of the education provided. See EMR under Patient Instructions for exercises provided during therapy sessions    ASSESSMENT     The patient with good tolerance to wand flexion on slant today to increase difficulty and gravity forces on the patient's motion. The patient benefited from cueing for lower trap activation manually during the above exercise with good carryover.     vIy Is progressing well towards her goals.     Pt prognosis is Fair.     Pt will continue to benefit from skilled outpatient physical therapy to address the deficits listed in the problem list box on initial evaluation, provide pt/family education and to maximize pt's level of independence in the home and community environment.     Pt's spiritual, cultural and educational needs considered and pt agreeable to plan of care and goals.     Anticipated barriers to physical therapy: none     Goals:   Short Term Goals: 12 weeks  1. Pt will be compliant with HEP 50% of prescribed amount.   2. The pt to demo improvement in R shoulder PROM to by 80% of the L  3.  The pt to demo tolerance to being out of the sling for 24 hours with pain <2/10     Long Term Goals:  36 weeks   Pt will be compliant with % of prescribed amount.   The pt to improvement in AROM of L shoulder within 80% of R shoulder to improve tolerance to OH movement   The pt to  Demo at least 4+/5 of RTC muscle testing to demo improvement in tolerance to activity  The pt to tolerate lifting 50# from the ground to waist height per job requirement description   The pt will report full participation in ADLs and IADLs  without restrictions related to L Shoulder.      PLAN     Follow TSA procedure     Dorothy Basurto, PT

## 2023-09-13 ENCOUNTER — CLINICAL SUPPORT (OUTPATIENT)
Dept: REHABILITATION | Facility: HOSPITAL | Age: 43
End: 2023-09-13
Payer: COMMERCIAL

## 2023-09-13 ENCOUNTER — PATIENT MESSAGE (OUTPATIENT)
Dept: SPORTS MEDICINE | Facility: CLINIC | Age: 43
End: 2023-09-13
Payer: COMMERCIAL

## 2023-09-13 DIAGNOSIS — M25.612 DECREASED RANGE OF MOTION OF LEFT SHOULDER: Primary | ICD-10-CM

## 2023-09-13 PROCEDURE — 97110 THERAPEUTIC EXERCISES: CPT | Mod: CQ

## 2023-09-13 PROCEDURE — 97112 NEUROMUSCULAR REEDUCATION: CPT | Mod: CQ

## 2023-09-13 PROCEDURE — 97140 MANUAL THERAPY 1/> REGIONS: CPT | Mod: CQ

## 2023-09-13 NOTE — PROGRESS NOTES
"  [facility]   Physical Therapy Treatment Note     Name: Ivy Salgado  Clinic Number: [id number]    Therapy Diagnosis:   Encounter Diagnosis   Name Primary?    Decreased range of motion of left shoulder Yes             Physician: Luis Madden DO    Visit Date: 9/13/2023       Evaluation Date: 8/8/2023  Authorization Period Expiration: 7/6/2024  Plan of Care Expiration: 12/31/2023  Progress Note Due: 9/10/2023  Visit # / Visits authorized: 10/ 20  Foto: 1/1      Time In: 1310   Time Out: 14:14  Total Billable Time: 55 minutes     DOS: 7/18/2023  S/p: left  1. Total shoulder arthroplasty (complex, -22 modifier)  2. Shoulder lysis of adhesions  3. Shoulder subpectoral biceps tenodesis (CPT 07927)  Post-Op Precautions: We will follow the shoulder arthroplasty guidelines. The patient remained in a sling for 6 weeks. We will start PT at the 3-week martina.         Precautions: Fall    SUBJECTIVE     Pt reports: still holding arm by side     She was compliant with home exercise program.  Response to previous treatment: improvement in pain at rest   Functional change: able to josefa being out of sling for a little bit     Pain: 3/10  Location: left shoulder      OBJECTIVE     Shoulder Flexion AAROM: 120 ; ER PROM to 20    Treatment       Ivy received the treatments listed below:      Therapeutic exercises to develop ROM for 20 minutes including:  Pulleys 5m   Wand chest press into FL 30x  UBE 5/5     Manual therapy techniques: Joint mobilizations and Soft tissue Mobilization were applied to the: L shoulder for 15 minutes, including:  Skilled PROM of L shoulder within tolerance and protocol in flexion/ER/IR, elbow flex/ext, wrist sup/pronation    Neuromuscular re-education activities to improve: Posture for 29 minutes. The following activities were included:  Elbow flexion with dowel for neutral ER focus 30x   AAROM ER supine 3x10 5" holds at 45 and 90  AAROM Flexion with and without table " elevated 30x   Wall slides 10x         Patient Education and Home Exercises     Home Exercises Provided and Patient Education Provided     Education provided:   - Add new exercises     Written Home Exercises Provided: yes. Exercises were reviewed and Ivy was able to demonstrate them prior to the end of the session.  Ivy demonstrated good  understanding of the education provided. See EMR under Patient Instructions for exercises provided during therapy sessions    ASSESSMENT     Slight improvement with PROM ER @ 45 degree angle today. Emphasized importance of pt to not guard/hold arm to belly as if she has a sling on to help with improving ROM.      Ivy Is progressing well towards her goals.     Pt prognosis is Fair.     Pt will continue to benefit from skilled outpatient physical therapy to address the deficits listed in the problem list box on initial evaluation, provide pt/family education and to maximize pt's level of independence in the home and community environment.     Pt's spiritual, cultural and educational needs considered and pt agreeable to plan of care and goals.     Anticipated barriers to physical therapy: none     Goals:   Short Term Goals: 12 weeks  1. Pt will be compliant with HEP 50% of prescribed amount.   2. The pt to demo improvement in R shoulder PROM to by 80% of the L  3.  The pt to demo tolerance to being out of the sling for 24 hours with pain <2/10     Long Term Goals:  36 weeks   Pt will be compliant with % of prescribed amount.   The pt to improvement in AROM of L shoulder within 80% of R shoulder to improve tolerance to [place] movement   The pt to  Demo at least 4+/5 of RTC muscle testing to demo improvement in tolerance to activity  The pt to tolerate lifting 50# from the ground to waist height per job requirement description   The pt will report full participation in ADLs and IADLs without restrictions related to L Shoulder.      PLAN     Follow TSA procedure     Vidya  Allan, BEATRIS

## 2023-09-15 DIAGNOSIS — F41.9 ANXIETY: ICD-10-CM

## 2023-09-15 NOTE — TELEPHONE ENCOUNTER
No care due was identified.  [facility] Embedded Care Due Messages. Reference number: [id number].   9/15/2023 7:54:36 AM CDT

## 2023-09-17 RX ORDER — ZOLPIDEM TARTRATE 10 MG/1
10 TABLET ORAL NIGHTLY
Qty: 30 TABLET | Refills: 0 | Status: SHIPPED | OUTPATIENT
Start: 2023-09-17 | End: 2023-10-16 | Stop reason: SDUPTHER

## 2023-09-18 ENCOUNTER — PATIENT MESSAGE (OUTPATIENT)
Dept: REHABILITATION | Facility: HOSPITAL | Age: 43
End: 2023-09-18

## 2023-09-18 ENCOUNTER — CLINICAL SUPPORT (OUTPATIENT)
Dept: REHABILITATION | Facility: HOSPITAL | Age: 43
End: 2023-09-18
Payer: COMMERCIAL

## 2023-09-18 DIAGNOSIS — M25.612 DECREASED RANGE OF MOTION OF LEFT SHOULDER: Primary | ICD-10-CM

## 2023-09-18 PROCEDURE — 97140 MANUAL THERAPY 1/> REGIONS: CPT

## 2023-09-18 PROCEDURE — 97112 NEUROMUSCULAR REEDUCATION: CPT

## 2023-09-18 PROCEDURE — 97110 THERAPEUTIC EXERCISES: CPT

## 2023-09-19 ENCOUNTER — OFFICE VISIT (OUTPATIENT)
Dept: OBSTETRICS AND GYNECOLOGY | Facility: CLINIC | Age: 43
End: 2023-09-19
Payer: COMMERCIAL

## 2023-09-19 ENCOUNTER — LAB VISIT (OUTPATIENT)
Dept: LAB | Facility: HOSPITAL | Age: 43
End: 2023-09-19
Attending: STUDENT IN AN ORGANIZED HEALTH CARE EDUCATION/TRAINING PROGRAM
Payer: COMMERCIAL

## 2023-09-19 VITALS
DIASTOLIC BLOOD PRESSURE: 80 MMHG | WEIGHT: 102.75 LBS | SYSTOLIC BLOOD PRESSURE: 104 MMHG | BODY MASS INDEX: 19.41 KG/M2

## 2023-09-19 DIAGNOSIS — Z11.3 SCREEN FOR STD (SEXUALLY TRANSMITTED DISEASE): ICD-10-CM

## 2023-09-19 DIAGNOSIS — Z01.419 WELL WOMAN EXAM: Primary | ICD-10-CM

## 2023-09-19 LAB
HAV IGM SERPL QL IA: NORMAL
HBV CORE IGM SERPL QL IA: NORMAL
HBV SURFACE AG SERPL QL IA: NORMAL
HCV AB SERPL QL IA: NORMAL
HIV 1+2 AB+HIV1 P24 AG SERPL QL IA: NORMAL

## 2023-09-19 PROCEDURE — 99396 PREV VISIT EST AGE 40-64: CPT | Mod: S$GLB,,, | Performed by: STUDENT IN AN ORGANIZED HEALTH CARE EDUCATION/TRAINING PROGRAM

## 2023-09-19 PROCEDURE — 3074F PR MOST RECENT SYSTOLIC BLOOD PRESSURE < 130 MM HG: ICD-10-PCS | Mod: CPTII,S$GLB,, | Performed by: STUDENT IN AN ORGANIZED HEALTH CARE EDUCATION/TRAINING PROGRAM

## 2023-09-19 PROCEDURE — 87591 N.GONORRHOEAE DNA AMP PROB: CPT | Performed by: STUDENT IN AN ORGANIZED HEALTH CARE EDUCATION/TRAINING PROGRAM

## 2023-09-19 PROCEDURE — 86592 SYPHILIS TEST NON-TREP QUAL: CPT | Performed by: STUDENT IN AN ORGANIZED HEALTH CARE EDUCATION/TRAINING PROGRAM

## 2023-09-19 PROCEDURE — 3079F PR MOST RECENT DIASTOLIC BLOOD PRESSURE 80-89 MM HG: ICD-10-PCS | Mod: CPTII,S$GLB,, | Performed by: STUDENT IN AN ORGANIZED HEALTH CARE EDUCATION/TRAINING PROGRAM

## 2023-09-19 PROCEDURE — 1160F PR REVIEW ALL MEDS BY PRESCRIBER/CLIN PHARMACIST DOCUMENTED: ICD-10-PCS | Mod: CPTII,S$GLB,, | Performed by: STUDENT IN AN ORGANIZED HEALTH CARE EDUCATION/TRAINING PROGRAM

## 2023-09-19 PROCEDURE — 3079F DIAST BP 80-89 MM HG: CPT | Mod: CPTII,S$GLB,, | Performed by: STUDENT IN AN ORGANIZED HEALTH CARE EDUCATION/TRAINING PROGRAM

## 2023-09-19 PROCEDURE — 81514 NFCT DS BV&VAGINITIS DNA ALG: CPT | Performed by: STUDENT IN AN ORGANIZED HEALTH CARE EDUCATION/TRAINING PROGRAM

## 2023-09-19 PROCEDURE — 3074F SYST BP LT 130 MM HG: CPT | Mod: CPTII,S$GLB,, | Performed by: STUDENT IN AN ORGANIZED HEALTH CARE EDUCATION/TRAINING PROGRAM

## 2023-09-19 PROCEDURE — 87389 HIV-1 AG W/HIV-1&-2 AB AG IA: CPT | Performed by: STUDENT IN AN ORGANIZED HEALTH CARE EDUCATION/TRAINING PROGRAM

## 2023-09-19 PROCEDURE — 99396 PR PREVENTIVE VISIT,EST,40-64: ICD-10-PCS | Mod: S$GLB,,, | Performed by: STUDENT IN AN ORGANIZED HEALTH CARE EDUCATION/TRAINING PROGRAM

## 2023-09-19 PROCEDURE — 1160F RVW MEDS BY RX/DR IN RCRD: CPT | Mod: CPTII,S$GLB,, | Performed by: STUDENT IN AN ORGANIZED HEALTH CARE EDUCATION/TRAINING PROGRAM

## 2023-09-19 PROCEDURE — 99999 PR PBB SHADOW E&M-EST. PATIENT-LVL III: ICD-10-PCS | Mod: PBBFAC,,, | Performed by: STUDENT IN AN ORGANIZED HEALTH CARE EDUCATION/TRAINING PROGRAM

## 2023-09-19 PROCEDURE — 1159F PR MEDICATION LIST DOCUMENTED IN MEDICAL RECORD: ICD-10-PCS | Mod: CPTII,S$GLB,, | Performed by: STUDENT IN AN ORGANIZED HEALTH CARE EDUCATION/TRAINING PROGRAM

## 2023-09-19 PROCEDURE — 3008F BODY MASS INDEX DOCD: CPT | Mod: CPTII,S$GLB,, | Performed by: STUDENT IN AN ORGANIZED HEALTH CARE EDUCATION/TRAINING PROGRAM

## 2023-09-19 PROCEDURE — 3008F PR BODY MASS INDEX (BMI) DOCUMENTED: ICD-10-PCS | Mod: CPTII,S$GLB,, | Performed by: STUDENT IN AN ORGANIZED HEALTH CARE EDUCATION/TRAINING PROGRAM

## 2023-09-19 PROCEDURE — 99999 PR PBB SHADOW E&M-EST. PATIENT-LVL III: CPT | Mod: PBBFAC,,, | Performed by: STUDENT IN AN ORGANIZED HEALTH CARE EDUCATION/TRAINING PROGRAM

## 2023-09-19 PROCEDURE — 1159F MED LIST DOCD IN RCRD: CPT | Mod: CPTII,S$GLB,, | Performed by: STUDENT IN AN ORGANIZED HEALTH CARE EDUCATION/TRAINING PROGRAM

## 2023-09-19 PROCEDURE — 36415 COLL VENOUS BLD VENIPUNCTURE: CPT | Performed by: STUDENT IN AN ORGANIZED HEALTH CARE EDUCATION/TRAINING PROGRAM

## 2023-09-19 PROCEDURE — 80074 ACUTE HEPATITIS PANEL: CPT | Performed by: STUDENT IN AN ORGANIZED HEALTH CARE EDUCATION/TRAINING PROGRAM

## 2023-09-19 RX ORDER — TRIAMCINOLONE ACETONIDE 1 MG/G
CREAM TOPICAL 2 TIMES DAILY
COMMUNITY
Start: 2023-08-08 | End: 2024-02-10

## 2023-09-19 RX ORDER — ESTRADIOL 0.1 MG/G
1 CREAM VAGINAL DAILY
Qty: 30 G | Refills: 12 | Status: ON HOLD | OUTPATIENT
Start: 2023-09-19 | End: 2024-01-17

## 2023-09-19 NOTE — PROGRESS NOTES
History & Physical  Gynecology      SUBJECTIVE:     Chief Complaint: Well Woman Exam       History of Present Illness:  43 y.o. female  here for annual GYN visit.   Patient has a complex medical and surgical history significant for Crohn's disease with multiple prior intestinal procedures with resultant total colectomy and end ileostomy in . She also underwent a SHELLIE/RSO in  complicated by incidental cystotomy. Then in 2019 she underwent an xlap/BSO/extensive JUAN with Dr. German. According to the patient she has intermittent ovarian reminant syndrome.   She denies vaginal itching, irritation, or discharge.  Patient suspects that her partner may have had other sexual partners and she recently was sexually active with a new partner and she requests STD testing. She denies symptoms at this time. Does have complaints of vaginal dryness.   She does not use tobacco, alcohol or drugs.  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 Lives with her daughter and reports feeling safe at home.     Patient has no history of abnormal paps, per report  Last MMG: BIRADS - 2    Patient's maternal cousin and had breast cancer and her maternal aunt had endometrial cancer.     Review of patient's allergies indicates:   Allergen Reactions    Azathioprine sodium Other (See Comments)     Other reaction(s): pancreatitis  Other reaction(s): pancreatitis    Methotrexate analogues Other (See Comments)     leukopenia    Stelara [ustekinumab] Other (See Comments)     Multiple infections    Zofran [ondansetron hcl (pf)] Other (See Comments)     Per patient causes prolong QT    Vancomycin analogues Other (See Comments)     Made her red    Azathioprine      Other reaction(s): Unknown    Methotrexate      Other reaction(s): infection-    Morphine Itching and Other (See Comments)     Other reaction(s): Itching    Zofran [ondansetron hcl]      Other reaction(s): Hives    Bactrim [sulfamethoxazole-trimethoprim] Palpitations    Ciprofloxacin  Palpitations       Past Medical History:   Diagnosis Date    Abnormal Pap smear 2007    Abnormal Pap smear 5/26/2011    Anemia     Anxiety     Arthritis     C. difficile diarrhea     Crohn's disease     Depression 8/5/2017    Encounter for blood transfusion     Genital HSV     History of colposcopy with cervical biopsy 2007 and 7/2011 2007-LYLA I  and 7/2011- LYLA I    Hypertension     Kidney stone     Kidney stone     Melanoma     Recurrent UTI 4/3/2013    S/P ileostomy 7/9/2012    Sterilization 6/23/2012     Past Surgical History:   Procedure Laterality Date    ABDOMINAL SURGERY      APPENDECTOMY      ARTHROPLASTY OF SHOULDER Left 7/18/2023    Procedure: ARTHROPLASTY, SHOULDER;  Surgeon: Sharon Babb MD;  Location: [facility] OR;  Service: Orthopedics;  Laterality: Left;    AUGMENTATION OF BREAST Bilateral 06/2022    gel implants    BILATERAL SALPINGO-OOPHORECTOMY (BSO) Bilateral 05/30/2019    Procedure: SALPINGO-OOPHORECTOMY, BILATERAL;  Surgeon: Rupa German MD;  Location: [facility] OR [address] [place] [address];  Service: OB/GYN;  Laterality: Bilateral;    BLADDER SURGERY      partial cystectomy due to fistula    breast lift      BREAST SURGERY      [facility]      COLON SURGERY      COLONOSCOPY      CYSTOSCOPY  09/23/2020    Procedure: CYSTOSCOPY;  Surgeon: Sascha Florentino MD;  Location: [facility] OR [address] [place] [address];  Service: Urology;;    CYSTOSCOPY W/ URETERAL STENT PLACEMENT Left 09/15/2020    Procedure: CYSTOSCOPY, WITH URETERAL STENT INSERTION;  Surgeon: Sascha Florentino MD;  Location: [facility] OR [address] [place] [address];  Service: Urology;  Laterality: Left;    DIAGNOSTIC LAPAROSCOPY N/A 07/09/2020    Procedure: LAPAROSCOPY, DIAGNOSTIC;  Surgeon: Gilson El MD;  Location: [facility] OR;  Service: OB/GYN;  Laterality: N/A;    EXCISION OF MELANOMA  07/17/2019    ILEOSTOMY      LAPAROSCOPIC LYSIS OF ADHESIONS N/A 07/09/2020    Procedure: LYSIS, ADHESIONS, LAPAROSCOPIC;  Surgeon: Gilson El MD;  Location: [facility] OR;  Service: OB/GYN;  Laterality: N/A;     LASER LITHOTRIPSY  2020    Procedure: LITHOTRIPSY, USING LASER;  Surgeon: Sascha Florentino MD;  Location: [facility] OR 1ST FLR;  Service: Urology;;    LYSIS OF ADHESIONS N/A 2019    Procedure: LYSIS, ADHESIONS;  Surgeon: Rupa German MD;  Location: [facility] OR 2ND FLR;  Service: OB/GYN;  Laterality: N/A;    OOPHORECTOMY Right 2015    PORTACATH PLACEMENT  2017    SKIN BIOPSY      SMALL INTESTINE SURGERY      age 18 Y    TOTAL ABDOMINAL HYSTERECTOMY  2015    TOTAL COLECTOMY      TUBAL LIGATION  2012    UPPER GASTROINTESTINAL ENDOSCOPY      URETEROSCOPIC REMOVAL OF URETERIC CALCULUS  2020    Procedure: REMOVAL, CALCULUS, URETER, URETEROSCOPIC;  Surgeon: Sascha Florentino MD;  Location: [facility] OR 1ST FLR;  Service: Urology;;    URETEROSCOPY Left 2020    Procedure: URETEROSCOPY;  Surgeon: Sascha Florentino MD;  Location: [facility] OR 1ST FLR;  Service: Urology;  Laterality: Left;     OB History          2    Para   1    Term   1       0    AB   1    Living   1         SAB   1    IAB   0    Ectopic   0    Multiple   0    Live Births   1               Family History   Problem Relation Age of Onset    Diabetes Paternal Grandfather     Hearing loss Paternal Grandmother     Cancer Maternal Grandfather         Skin    Skin cancer Maternal Grandfather     Diabetes Maternal Grandfather     Heart disease Maternal Grandfather     Colon cancer Father     Cancer Father         Colon    Liver cancer Father     Hyperlipidemia Father     Hypertension Mother     Crohn's disease Brother     Endometrial cancer Maternal Aunt     Breast cancer Maternal Cousin 41    Crohn's disease Daughter     Ovarian cancer Neg Hx     Melanoma Neg Hx      Social History     Tobacco Use    Smoking status: Never    Smokeless tobacco: Never   Substance Use Topics    Alcohol use: Not Currently     Alcohol/week: 0.0 standard drinks of alcohol    Drug use: No       Current Outpatient Medications    Medication Sig    droNABinol (MARINOL) 5 MG capsule Take 1 capsule (5 mg total) by mouth 2 (two) times daily before meals.    gabapentin (NEURONTIN) 300 MG capsule Take 1 capsule (300 mg total) by mouth 2 (two) times daily.    HYDROcodone-acetaminophen (NORCO)  mg per tablet Take 1 tablet by mouth every 8 (eight) hours as needed for Pain.    loperamide (IMODIUM) 2 mg capsule Take 2 mg by mouth daily as needed for Diarrhea.    mirtazapine (REMERON SOL-TAB) 30 MG disintegrating tablet Take 1 tablet (30 mg total) by mouth nightly.    multivitamin (THERAGRAN) per tablet Take 1 tablet by mouth once daily.    nadoloL (CORGARD) 40 MG tablet Take 1 tablet (40 mg total) by mouth once daily.    pantoprazole (PROTONIX) 40 MG tablet Take 1 tablet (40 mg total) by mouth 2 (two) times daily.    zolpidem (AMBIEN) 10 mg Tab Take 1 tablet (10 mg total) by mouth every evening.    aspirin (ECOTRIN) 81 MG EC tablet Take 1 tablet (81 mg total) by mouth once daily. For 6 weeks starting the day after surgery. (Patient not taking: Reported on 9/19/2023)    clonazePAM (KLONOPIN) 1 MG tablet TAKE 1 AND 1/2 TABLETS BY MOUTH TWICE DAILY AS NEEDED FOR ANXIETY (Patient not taking: Reported on 9/19/2023)    cyclobenzaprine (FLEXERIL) 10 MG tablet Take 1 tablet (10 mg total) by mouth every evening as needed for muscle pain (Patient not taking: Reported on 9/19/2023)    promethazine (PHENERGAN) 25 MG tablet TAKE 1 TABLET BY MOUTH EVERY 8 HOURS FOR NAUSEA (Patient not taking: Reported on 9/19/2023)    triamcinolone acetonide 0.1% (KENALOG) 0.1 % cream Apply topically 2 (two) times daily.    valACYclovir (VALTREX) 1000 MG tablet Take one tablet by mouth twice daily for 5 days (Patient not taking: Reported on 9/19/2023)    vedolizumab (ENTYVIO) 300 mg SolR injection Inject 300 mg into the vein.     Current Facility-Administered Medications   Medication    estradiol valerate (DELESTROGEN) injection 20 mg/mL    estradiol valerate injection 20 mg        Review of Systems:  Review of Systems   Constitutional:  Negative for chills, fatigue and fever.   HENT:  Negative for congestion.    Eyes:  Negative for visual disturbance.   Respiratory:  Negative for cough and shortness of breath.    Cardiovascular:  Negative for chest pain and palpitations.   Gastrointestinal:  Negative for abdominal distention, abdominal pain, constipation, diarrhea, nausea and vomiting.   Genitourinary:  Negative for difficulty urinating, dysuria, hematuria, vaginal bleeding and vaginal discharge.   Skin:  Negative for rash.   Neurological:  Negative for dizziness, seizures, light-headedness and headaches.   Hematological:  Does not bruise/bleed easily.   Psychiatric/Behavioral:  Negative for dysphoric mood. The patient is not nervous/anxious.         OBJECTIVE:     Physical Exam:  Vitals:    09/19/23 1311   BP: 104/80      Physical Exam  Vitals and nursing note reviewed. Exam conducted with a chaperone present.   Constitutional:       General: She is not in acute distress.     Appearance: She is well-developed.   HENT:      Head: Normocephalic and atraumatic.   Eyes:      Pupils: Pupils are equal, round, and reactive to light.   Cardiovascular:      Rate and Rhythm: Normal rate and regular rhythm.   Pulmonary:      Effort: Pulmonary effort is normal. No respiratory distress.   Chest:          Comments: Bilateral breast implants noted.   Abdominal:      General: There is no distension.      Palpations: Abdomen is soft. There is no mass.      Tenderness: There is no abdominal tenderness. There is no guarding.   Genitourinary:     Comments: SSE: Normal external female genitalia, normal urethral meatus, atrophic vaginal rugae and vaginal mucosa, no vaginal blood in canal, normal physiologic discharge, surgically absent cervix and uterus, no adnexal masses palpated on bimanual exam.  Musculoskeletal:         General: Normal range of motion.      Cervical back: Normal range of motion and neck  supple.   Skin:     General: Skin is warm and dry.   Neurological:      Mental Status: She is alert and oriented to person, place, and time.   Psychiatric:         Behavior: Behavior normal.         Thought Content: Thought content normal.         Judgment: Judgment normal.         ASSESSMENT/PLAN:     1. Well woman exam  - Pap smear - No longer indicated  - Mammogram - As above  - Colonoscopy - s/p Colectomy followed by Colorectal surgery  - Calcium and Vitamin D counseling  14 - 30 years old 1,300mg Ca/d; 600 IU vit D/d  31 - 50 years old 1,000 Ca/d; 600 IU vit D/d  51 - 70 year old: 1,200 Ca/d; 600 IU vit D/d  71+ years old 1,200 Ca/d; 800 IU vit D/d    2. Screen for STD (sexually transmitted disease)  - C. trachomatis/N. gonorrhoeae by AMP DNA  - Vaginosis Screen by DNA Probe  - HIV 1/2 Ag/Ab (4th Gen); Future  - RPR; Future  - Hepatitis Panel, Acute; Future    3. Vaginal atrophy  - Encouraged continued use of lubricants with intercourse  - Will trial vaginal estrogen    Tawanda Mahajan M.D.  OB/GYN  [facility]

## 2023-09-20 ENCOUNTER — CLINICAL SUPPORT (OUTPATIENT)
Dept: REHABILITATION | Facility: HOSPITAL | Age: 43
End: 2023-09-20
Payer: COMMERCIAL

## 2023-09-20 ENCOUNTER — PATIENT MESSAGE (OUTPATIENT)
Dept: OBSTETRICS AND GYNECOLOGY | Facility: CLINIC | Age: 43
End: 2023-09-20
Payer: COMMERCIAL

## 2023-09-20 DIAGNOSIS — M25.612 DECREASED RANGE OF MOTION OF LEFT SHOULDER: Primary | ICD-10-CM

## 2023-09-20 DIAGNOSIS — Z98.890 S/P SHOULDER SURGERY: ICD-10-CM

## 2023-09-20 DIAGNOSIS — G89.29 CHRONIC LEFT SHOULDER PAIN: ICD-10-CM

## 2023-09-20 DIAGNOSIS — M25.512 CHRONIC LEFT SHOULDER PAIN: ICD-10-CM

## 2023-09-20 LAB — RPR SER QL: NORMAL

## 2023-09-20 PROCEDURE — 97110 THERAPEUTIC EXERCISES: CPT | Mod: CQ

## 2023-09-20 PROCEDURE — 97112 NEUROMUSCULAR REEDUCATION: CPT | Mod: CQ

## 2023-09-20 PROCEDURE — 97140 MANUAL THERAPY 1/> REGIONS: CPT | Mod: CQ

## 2023-09-20 RX ORDER — CYCLOBENZAPRINE HCL 10 MG
10 TABLET ORAL NIGHTLY
Qty: 15 TABLET | Refills: 0 | Status: SHIPPED | OUTPATIENT
Start: 2023-09-20 | End: 2023-10-02 | Stop reason: SDUPTHER

## 2023-09-20 RX ORDER — PROMETHAZINE HYDROCHLORIDE 25 MG/1
TABLET ORAL
Qty: 30 TABLET | Refills: 1 | Status: SHIPPED | OUTPATIENT
Start: 2023-09-20 | End: 2023-11-01 | Stop reason: SDUPTHER

## 2023-09-20 RX ORDER — VALACYCLOVIR HYDROCHLORIDE 500 MG/1
500 TABLET, FILM COATED ORAL DAILY
Qty: 90 TABLET | Refills: 3 | Status: SHIPPED | OUTPATIENT
Start: 2023-09-20 | End: 2023-09-29 | Stop reason: SDUPTHER

## 2023-09-20 RX ORDER — HYDROCODONE BITARTRATE AND ACETAMINOPHEN 10; 325 MG/1; MG/1
1 TABLET ORAL EVERY 12 HOURS PRN
Qty: 21 TABLET | Refills: 0 | Status: SHIPPED | OUTPATIENT
Start: 2023-09-20 | End: 2023-10-02 | Stop reason: SDUPTHER

## 2023-09-20 NOTE — PROGRESS NOTES
[facility]   Physical Therapy Treatment Note     Name: Ivy Salgado  Clinic Number: [id number]    Therapy Diagnosis:   Encounter Diagnosis   Name Primary?    Decreased range of motion of left shoulder Yes             Physician: Luis Madden DO    Visit Date: 9/18/2023       Evaluation Date: 8/8/2023  Authorization Period Expiration: 7/6/2024  Plan of Care Expiration: 12/31/2023  Progress Note Due: 9/10/2023  Visit # / Visits authorized: 12/ 20  Foto: 1/1      Time In: 1400  Time Out: 1500  Total Billable Time: 55 minutes     DOS: 7/18/2023  S/p: left  1. Total shoulder arthroplasty (complex, -22 modifier)  2. Shoulder lysis of adhesions  3. Shoulder subpectoral biceps tenodesis (CPT 30771)  Post-Op Precautions: We will follow the shoulder arthroplasty guidelines. The patient remained in a sling for 6 weeks. We will start PT at the 3-week martina.         Precautions: Fall    SUBJECTIVE     Pt reports: she is feeling a lot better, has less apin.     She was compliant with home exercise program.  Response to previous treatment: improvement in pain at rest   Functional change: able to josefa being out of sling for a little bit     Pain: 3/10  Location: left shoulder      OBJECTIVE     Shoulder Flexion AAROM: 110 ; ER PROM to 20  Shoulder AROM 80 deg    Treatment       Ivy received the treatments listed below:      Therapeutic exercises to develop ROM for 20 minutes including:  Pulleys 5m   Wand chest press into FL 30x  UBE 5/5   Slot machines to shld height (110 deg) 30x     Manual therapy techniques: Joint mobilizations and Soft tissue Mobilization were applied to the: L shoulder for 15 minutes, including:  Skilled PROM of L shoulder within tolerance and protocol in flexion/ER/IR, elbow flex/ext, wrist sup/pronation    Neuromuscular re-education activities to improve: Posture for 29 minutes. The following activities were included:  Elbow flexion with dowel for neutral ER focus 30x  "  AAROM ER supine 3x10 5" holds at 45 and 90  AAROM Flexion with and without table elevated 30x   Wall slides 10x   Isometrics ER 10x10s holds   Isometrics IR 10x10s holds         Patient Education and Home Exercises     Home Exercises Provided and Patient Education Provided     Education provided:   - Add new exercises     Written Home Exercises Provided: yes. Exercises were reviewed and Ivy was able to demonstrate them prior to the end of the session.  Ivy demonstrated good  understanding of the education provided. See EMR under Patient Instructions for exercises provided during therapy sessions    ASSESSMENT     The patient with a significant improvement in her tolerance to PT today, able to push her ROM a bit more today. Tolerated 80 deg of shoulder flexion with less lumbar extension and able to achieve 110 deg of shoulder flexion assisted with the dowel. Cont to prog as josefa.     Ivy Is progressing well towards her goals.     Pt prognosis is Fair.     Pt will continue to benefit from skilled outpatient physical therapy to address the deficits listed in the problem list box on initial evaluation, provide pt/family education and to maximize pt's level of independence in the home and community environment.     Pt's spiritual, cultural and educational needs considered and pt agreeable to plan of care and goals.     Anticipated barriers to physical therapy: none     Goals:   Short Term Goals: 12 weeks  1. Pt will be compliant with HEP 50% of prescribed amount.   2. The pt to demo improvement in R shoulder PROM to by 80% of the L  3.  The pt to demo tolerance to being out of the sling for 24 hours with pain <2/10     Long Term Goals:  36 weeks   Pt will be compliant with % of prescribed amount.   The pt to improvement in AROM of L shoulder within 80% of R shoulder to improve tolerance to OH movement   The pt to  Demo at least 4+/5 of RTC muscle testing to demo improvement in tolerance to activity  The " pt to tolerate lifting 50# from the ground to waist height per job requirement description   The pt will report full participation in ADLs and IADLs without restrictions related to L Shoulder.      PLAN     Follow TSA procedure     Dorothy Basurto, PT

## 2023-09-20 NOTE — PROGRESS NOTES
"  [facility] OUTPAT[facility] THERAPY AND WELL[facility]   Physical Therapy Treatment Note     Name: Ivy Salgado  Clinic Number: [id number]    Therapy Diagnosis:   Encounter Diagnosis   Name Primary?    Decreased range of motion of left shoulder Yes             Physician: Luis Madden DO    Visit Date: 9/20/2023       Evaluation Date: 8/8/2023  Authorization Period Expiration: 7/6/2024  Plan of Care Expiration: 12/31/2023  Progress Note Due: 9/10/2023  Visit # / Visits authorized: 12/ 20  Foto: 1/1      Time In: 1:05 p  Time Out: 2:10 p  Total Billable Time: 42 minutes     DOS: 7/18/2023  S/p: left  1. Total shoulder arthroplasty (complex, -22 modifier)  2. Shoulder lysis of adhesions  3. Shoulder subpectoral biceps tenodesis (CPT 69812)  Post-Op Precautions: We will follow the shoulder arthroplasty guidelines. The patient remained in a sling for 6 weeks. We will start PT at the 3-week martina.         Precautions: Fall    SUBJECTIVE     Pt reports: not hurting as much    She was compliant with home exercise program.  Response to previous treatment: improvement in pain at rest   Functional change: able to josefa being out of sling for a little bit     Pain: 3/10  Location: left shoulder      OBJECTIVE     Shoulder Flexion AAROM: 120 ; ER PROM to 20    Treatment       Ivy received the treatments listed below:      Therapeutic exercises to develop ROM for 20 minutes including:  Pulleys 5m   Wand chest press into FL 30x  UBE 5/5     Manual therapy techniques: Joint mobilizations and Soft tissue Mobilization were applied to the: L shoulder for 15 minutes, including:  Skilled PROM of L shoulder within tolerance and protocol in flexion/ER/IR, elbow flex/ext, wrist sup/pronation    Neuromuscular re-education activities to improve: Posture for 30 min seated AROM ER  AAROM ER supine 3x10 5" holds at 45 and 90  AAROM Flexion with and without table elevated 30x   Landmine 4x8        Patient Education and Home Exercises     Home " Exercises Provided and Patient Education Provided     Education provided:   - Add new exercises     Written Home Exercises Provided: yes. Exercises were reviewed and Ivy was able to demonstrate them prior to the end of the session.  Ivy demonstrated good  understanding of the education provided. See EMR under Patient Instructions for exercises provided during therapy sessions    ASSESSMENT     Some improvement in flexion and ER ROM. Landmines were challenging 2* to strength as well as trying not to compensate with lumbar spine.      Ivy Is progressing well towards her goals.     Pt prognosis is Fair.     Pt will continue to benefit from skilled outpatient physical therapy to address the deficits listed in the problem list box on initial evaluation, provide pt/family education and to maximize pt's level of independence in the home and community environment.     Pt's spiritual, cultural and educational needs considered and pt agreeable to plan of care and goals.     Anticipated barriers to physical therapy: none     Goals:   Short Term Goals: 12 weeks  1. Pt will be compliant with HEP 50% of prescribed amount.   2. The pt to demo improvement in R shoulder PROM to by 80% of the L  3.  The pt to demo tolerance to being out of the sling for 24 hours with pain <2/10     Long Term Goals:  36 weeks   Pt will be compliant with % of prescribed amount.   The pt to improvement in AROM of L shoulder within 80% of R shoulder to improve tolerance to OH movement   The pt to  Demo at least 4+/5 of RTC muscle testing to demo improvement in tolerance to activity  The pt to tolerate lifting 50# from the ground to waist height per job requirement description   The pt will report full participation in ADLs and IADLs without restrictions related to L Shoulder.      PLAN     Follow TSA procedure     Viyda Lemus, PTA

## 2023-09-21 ENCOUNTER — PATIENT MESSAGE (OUTPATIENT)
Dept: REHABILITATION | Facility: HOSPITAL | Age: 43
End: 2023-09-21
Payer: COMMERCIAL

## 2023-09-22 DIAGNOSIS — F41.1 GENERALIZED ANXIETY DISORDER: ICD-10-CM

## 2023-09-22 LAB
BACTERIAL VAGINOSIS DNA: NEGATIVE
C TRACH DNA SPEC QL NAA+PROBE: NOT DETECTED
CANDIDA GLABRATA DNA: NEGATIVE
CANDIDA KRUSEI DNA: NEGATIVE
CANDIDA RRNA VAG QL PROBE: NEGATIVE
N GONORRHOEA DNA SPEC QL NAA+PROBE: NOT DETECTED
T VAGINALIS RRNA GENITAL QL PROBE: NEGATIVE

## 2023-09-22 RX ORDER — CLONAZEPAM 1 MG/1
TABLET ORAL
Qty: 20 TABLET | Refills: 0 | OUTPATIENT
Start: 2023-09-22

## 2023-09-22 NOTE — TELEPHONE ENCOUNTER
No care due was identified.  [facility] Embedded Care Due Messages. Reference number: [id number].   9/22/2023 5:31:03 AM CDT

## 2023-09-23 ENCOUNTER — PATIENT MESSAGE (OUTPATIENT)
Dept: UROLOGY | Facility: CLINIC | Age: 43
End: 2023-09-23
Payer: COMMERCIAL

## 2023-09-23 NOTE — TELEPHONE ENCOUNTER
Refill Routing Note   Medication(s) are not appropriate for processing by [facility] for the following reason(s):      Drug-drug interaction    [facility] action(s):  Defer Care Due:  None identified     Medication Therapy Plan: Drug drug: valacyclovir 500mg and valacyclovir 1000mg      Appointments  past 12m or future 3m with PCP    Date Provider   Last Visit   10/3/2022 Gilson El MD   Next Visit   Visit date not found Gilson El MD   ED visits in past 90 days: 1        Note composed:1:09 PM 09/23/2023

## 2023-09-25 ENCOUNTER — PATIENT MESSAGE (OUTPATIENT)
Dept: INTERNAL MEDICINE | Facility: CLINIC | Age: 43
End: 2023-09-25
Payer: COMMERCIAL

## 2023-09-25 ENCOUNTER — LAB VISIT (OUTPATIENT)
Dept: LAB | Facility: HOSPITAL | Age: 43
End: 2023-09-25
Attending: FAMILY MEDICINE
Payer: COMMERCIAL

## 2023-09-25 ENCOUNTER — PATIENT MESSAGE (OUTPATIENT)
Dept: REHABILITATION | Facility: HOSPITAL | Age: 43
End: 2023-09-25
Payer: COMMERCIAL

## 2023-09-25 DIAGNOSIS — R39.9 UTI SYMPTOMS: Primary | ICD-10-CM

## 2023-09-25 DIAGNOSIS — R31.9 HEMATURIA, UNSPECIFIED TYPE: ICD-10-CM

## 2023-09-25 DIAGNOSIS — R31.9 HEMATURIA, UNSPECIFIED TYPE: Primary | ICD-10-CM

## 2023-09-25 LAB
BACTERIA #/AREA URNS AUTO: ABNORMAL /HPF
BILIRUB UR QL STRIP: NEGATIVE
CAOX CRY UR QL COMP ASSIST: ABNORMAL
CLARITY UR REFRACT.AUTO: ABNORMAL
COLOR UR AUTO: YELLOW
GLUCOSE UR QL STRIP: NEGATIVE
HGB UR QL STRIP: NEGATIVE
KETONES UR QL STRIP: NEGATIVE
LEUKOCYTE ESTERASE UR QL STRIP: ABNORMAL
MICROSCOPIC COMMENT: ABNORMAL
NITRITE UR QL STRIP: NEGATIVE
PH UR STRIP: 5 [PH] (ref 5–8)
PROT UR QL STRIP: NEGATIVE
RBC #/AREA URNS AUTO: 2 /HPF (ref 0–4)
SP GR UR STRIP: 1.02 (ref 1–1.03)
SQUAMOUS #/AREA URNS AUTO: 12 /HPF
URN SPEC COLLECT METH UR: ABNORMAL
WBC #/AREA URNS AUTO: 16 /HPF (ref 0–5)

## 2023-09-25 PROCEDURE — 81001 URINALYSIS AUTO W/SCOPE: CPT | Performed by: FAMILY MEDICINE

## 2023-09-25 RX ORDER — CEPHALEXIN 500 MG/1
500 CAPSULE ORAL EVERY 12 HOURS
Qty: 14 CAPSULE | Refills: 0 | Status: SHIPPED | OUTPATIENT
Start: 2023-09-25 | End: 2023-10-02

## 2023-09-25 RX ORDER — VALACYCLOVIR HYDROCHLORIDE 1 G/1
TABLET, FILM COATED ORAL
Qty: 10 TABLET | Refills: 3 | Status: ON HOLD | OUTPATIENT
Start: 2023-09-25 | End: 2023-10-31 | Stop reason: HOSPADM

## 2023-09-27 ENCOUNTER — CLINICAL SUPPORT (OUTPATIENT)
Dept: REHABILITATION | Facility: HOSPITAL | Age: 43
End: 2023-09-27
Payer: COMMERCIAL

## 2023-09-27 DIAGNOSIS — F41.1 GENERALIZED ANXIETY DISORDER: ICD-10-CM

## 2023-09-27 DIAGNOSIS — M25.612 DECREASED RANGE OF MOTION OF LEFT SHOULDER: Primary | ICD-10-CM

## 2023-09-27 PROCEDURE — 97140 MANUAL THERAPY 1/> REGIONS: CPT | Mod: CQ

## 2023-09-27 PROCEDURE — 97112 NEUROMUSCULAR REEDUCATION: CPT | Mod: CQ

## 2023-09-27 PROCEDURE — 97110 THERAPEUTIC EXERCISES: CPT | Mod: CQ

## 2023-09-27 RX ORDER — CLONAZEPAM 1 MG/1
TABLET ORAL
Qty: 20 TABLET | Refills: 0 | OUTPATIENT
Start: 2023-09-27

## 2023-09-27 NOTE — PROGRESS NOTES
"  [facility]   Physical Therapy Treatment Note     Name: Ivy Salgado  Clinic Number: [id number]    Therapy Diagnosis:   Encounter Diagnosis   Name Primary?    Decreased range of motion of left shoulder Yes               Physician: Luis Madden DO    Visit Date: 9/27/2023       Evaluation Date: 8/8/2023  Authorization Period Expiration: 7/6/2024  Plan of Care Expiration: 12/31/2023  Progress Note Due: 9/10/2023  Visit # / Visits authorized: 12/ 20  Foto: 1/1      Time In: 11:00 a  Time Out: 11:58 a  Total Billable Time: 58 minutes (PT tech extender used this session)     DOS: 7/18/2023  S/p: left  1. Total shoulder arthroplasty (complex, -22 modifier)  2. Shoulder lysis of adhesions  3. Shoulder subpectoral biceps tenodesis (CPT 31671)  Post-Op Precautions: We will follow the shoulder arthroplasty guidelines. The patient remained in a sling for 6 weeks. We will start PT at the 3-week martina.         Precautions: Fall    SUBJECTIVE     Pt reports: shoulder is doing ok    She was compliant with home exercise program.  Response to previous treatment: improvement in pain at rest   Functional change: able to josefa being out of sling for a little bit     Pain: 3/10  Location: left shoulder      OBJECTIVE     Shoulder Flexion AAROM: 120 ; ER PROM to 20    Treatment       Ivy received the treatments listed below:      Therapeutic exercises to develop ROM for 20 minutes including:  Pulleys 5m FL and scapiton  UBE 5/5 lvl 2    Manual therapy techniques: Joint mobilizations and Soft tissue Mobilization were applied to the: L shoulder for 15 minutes, including:  Skilled PROM of L shoulder within tolerance and protocol in flexion/ER/IR, elbow flex/ext, wrist sup/pronation    Neuromuscular re-education activities to improve: Posture for 23 min seated AROM ER  AAROM wand #2 ER supine 3x10 5" holds at 45 and 90  #2 Wand chest press into FL 30x  EOT elevated 2 rungs AROM FL 5x5  Landmine 4x8-np  SL " ER AROM 5x5      Patient Education and Home Exercises     Home Exercises Provided and Patient Education Provided     Education provided:   - Add new exercises     Written Home Exercises Provided: yes. Exercises were reviewed and Ivy was able to demonstrate them prior to the end of the session.  Ivy demonstrated good  understanding of the education provided. See EMR under Patient Instructions for exercises provided during therapy sessions    ASSESSMENT     Improvements in PROM ER @ 90 and FL at start of session. Still limited with ER @ scaption but improved with manual. Able to lift UE to shoulder height with EOT elevated without compensating. Cont to need therapy to address strength and ROM deficits that still remain.    Ivy Is progressing well towards her goals.     Pt prognosis is Fair.     Pt will continue to benefit from skilled outpatient physical therapy to address the deficits listed in the problem list box on initial evaluation, provide pt/family education and to maximize pt's level of independence in the home and community environment.     Pt's spiritual, cultural and educational needs considered and pt agreeable to plan of care and goals.     Anticipated barriers to physical therapy: none     Goals:   Short Term Goals: 12 weeks  1. Pt will be compliant with HEP 50% of prescribed amount.   2. The pt to demo improvement in R shoulder PROM to by 80% of the L  3.  The pt to demo tolerance to being out of the sling for 24 hours with pain <2/10     Long Term Goals:  36 weeks   Pt will be compliant with % of prescribed amount.   The pt to improvement in AROM of L shoulder within 80% of R shoulder to improve tolerance to OH movement   The pt to  Demo at least 4+/5 of RTC muscle testing to demo improvement in tolerance to activity  The pt to tolerate lifting 50# from the ground to waist height per job requirement description   The pt will report full participation in ADLs and IADLs without  restrictions related to L Shoulder.      PLAN     Follow TSA procedure     Vidya Lemus, PTA

## 2023-09-27 NOTE — TELEPHONE ENCOUNTER
No care due was identified.  [facility] Embedded Care Due Messages. Reference number: [id number].   9/27/2023 11:01:49 AM CDT

## 2023-09-28 ENCOUNTER — PATIENT MESSAGE (OUTPATIENT)
Dept: UROLOGY | Facility: CLINIC | Age: 43
End: 2023-09-28
Payer: COMMERCIAL

## 2023-09-28 DIAGNOSIS — B37.31 VAGINAL YEAST INFECTION: Primary | ICD-10-CM

## 2023-09-28 RX ORDER — FLUCONAZOLE 150 MG/1
150 TABLET ORAL
Qty: 3 TABLET | Refills: 0 | Status: SHIPPED | OUTPATIENT
Start: 2023-09-28 | End: 2024-02-10

## 2023-09-29 ENCOUNTER — CLINICAL SUPPORT (OUTPATIENT)
Dept: REHABILITATION | Facility: HOSPITAL | Age: 43
End: 2023-09-29
Payer: COMMERCIAL

## 2023-09-29 ENCOUNTER — OFFICE VISIT (OUTPATIENT)
Dept: INTERNAL MEDICINE | Facility: CLINIC | Age: 43
End: 2023-09-29
Payer: COMMERCIAL

## 2023-09-29 ENCOUNTER — PATIENT MESSAGE (OUTPATIENT)
Dept: REHABILITATION | Facility: HOSPITAL | Age: 43
End: 2023-09-29

## 2023-09-29 VITALS
OXYGEN SATURATION: 99 % | DIASTOLIC BLOOD PRESSURE: 80 MMHG | WEIGHT: 99 LBS | SYSTOLIC BLOOD PRESSURE: 130 MMHG | HEIGHT: 61 IN | HEART RATE: 70 BPM | BODY MASS INDEX: 18.69 KG/M2

## 2023-09-29 DIAGNOSIS — Z87.898 HISTORY OF PROLONGED Q-T INTERVAL ON ECG: ICD-10-CM

## 2023-09-29 DIAGNOSIS — M25.612 DECREASED RANGE OF MOTION OF LEFT SHOULDER: Primary | ICD-10-CM

## 2023-09-29 DIAGNOSIS — N30.01 ACUTE CYSTITIS WITH HEMATURIA: ICD-10-CM

## 2023-09-29 DIAGNOSIS — F41.1 GENERALIZED ANXIETY DISORDER: Primary | ICD-10-CM

## 2023-09-29 DIAGNOSIS — R31.9 HEMATURIA, UNSPECIFIED TYPE: ICD-10-CM

## 2023-09-29 PROCEDURE — 99999 PR PBB SHADOW E&M-EST. PATIENT-LVL IV: CPT | Mod: PBBFAC,,, | Performed by: FAMILY MEDICINE

## 2023-09-29 PROCEDURE — 1159F PR MEDICATION LIST DOCUMENTED IN MEDICAL RECORD: ICD-10-PCS | Mod: CPTII,S$GLB,, | Performed by: FAMILY MEDICINE

## 2023-09-29 PROCEDURE — 3075F SYST BP GE 130 - 139MM HG: CPT | Mod: CPTII,S$GLB,, | Performed by: FAMILY MEDICINE

## 2023-09-29 PROCEDURE — 97112 NEUROMUSCULAR REEDUCATION: CPT

## 2023-09-29 PROCEDURE — 99214 PR OFFICE/OUTPT VISIT, EST, LEVL IV, 30-39 MIN: ICD-10-PCS | Mod: S$GLB,,, | Performed by: FAMILY MEDICINE

## 2023-09-29 PROCEDURE — 97140 MANUAL THERAPY 1/> REGIONS: CPT

## 2023-09-29 PROCEDURE — 3079F DIAST BP 80-89 MM HG: CPT | Mod: CPTII,S$GLB,, | Performed by: FAMILY MEDICINE

## 2023-09-29 PROCEDURE — 3008F PR BODY MASS INDEX (BMI) DOCUMENTED: ICD-10-PCS | Mod: CPTII,S$GLB,, | Performed by: FAMILY MEDICINE

## 2023-09-29 PROCEDURE — 99999 PR PBB SHADOW E&M-EST. PATIENT-LVL IV: ICD-10-PCS | Mod: PBBFAC,,, | Performed by: FAMILY MEDICINE

## 2023-09-29 PROCEDURE — 3075F PR MOST RECENT SYSTOLIC BLOOD PRESS GE 130-139MM HG: ICD-10-PCS | Mod: CPTII,S$GLB,, | Performed by: FAMILY MEDICINE

## 2023-09-29 PROCEDURE — 3079F PR MOST RECENT DIASTOLIC BLOOD PRESSURE 80-89 MM HG: ICD-10-PCS | Mod: CPTII,S$GLB,, | Performed by: FAMILY MEDICINE

## 2023-09-29 PROCEDURE — 97110 THERAPEUTIC EXERCISES: CPT

## 2023-09-29 PROCEDURE — 3008F BODY MASS INDEX DOCD: CPT | Mod: CPTII,S$GLB,, | Performed by: FAMILY MEDICINE

## 2023-09-29 PROCEDURE — 99214 OFFICE O/P EST MOD 30 MIN: CPT | Mod: S$GLB,,, | Performed by: FAMILY MEDICINE

## 2023-09-29 PROCEDURE — 1159F MED LIST DOCD IN RCRD: CPT | Mod: CPTII,S$GLB,, | Performed by: FAMILY MEDICINE

## 2023-09-29 RX ORDER — CLONAZEPAM 1 MG/1
1 TABLET ORAL 2 TIMES DAILY
Qty: 60 TABLET | Refills: 2 | Status: SHIPPED | OUTPATIENT
Start: 2023-09-29 | End: 2024-01-08 | Stop reason: SDUPTHER

## 2023-09-29 NOTE — TELEPHONE ENCOUNTER
----- Message from Joseph Clifton RPH sent at 7/2/2018  4:31 PM CDT -----  Regarding: Nexavar dose  Hello,    We received a prescription for Nexavar 200 mg daily.    We just wanted to confirm this dose.  In the 6/22/18 telephone encounter, Dr. Day noted Nexavar 400 mg daily, which has been more commonly used for desmoid tumors.    Please confirm desired initial Nexavar dose.    Thank you,  Maxx   Please see the attached refill request.

## 2023-09-29 NOTE — PROGRESS NOTES
Subjective:       Patient ID: Ivy Salgado is a 43 y.o. female.    Chief Complaint: med discuss    HPI    H/o long term benzos since a teenager. Has been able to wean down somewhat.   H/o long QT and has tried a couple different SSRIs and had issues with side effects and ended up in ED.       All of your core healthy metrics are met.      Social History     Social History Narrative    Not on file       Family History   Problem Relation Age of Onset    Diabetes Paternal Grandfather     Hearing loss Paternal Grandmother     Cancer Maternal Grandfather         Skin    Skin cancer Maternal Grandfather     Diabetes Maternal Grandfather     Heart disease Maternal Grandfather     Colon cancer Father     Cancer Father         Colon    Liver cancer Father     Hyperlipidemia Father     Hypertension Mother     Crohn's disease Brother     Endometrial cancer Maternal Aunt     Breast cancer Maternal Cousin 41    Crohn's disease Daughter     Ovarian cancer Neg Hx     Melanoma Neg Hx        Current Outpatient Medications:     cephALEXin (KEFLEX) 500 MG capsule, Take 1 capsule (500 mg total) by mouth every 12 (twelve) hours. for 7 days, Disp: 14 capsule, Rfl: 0    cyclobenzaprine (FLEXERIL) 10 MG tablet, Take 1 tablet (10 mg total) by mouth every evening as needed for muscle pain, Disp: 15 tablet, Rfl: 0    droNABinol (MARINOL) 5 MG capsule, Take 1 capsule (5 mg total) by mouth 2 (two) times daily before meals., Disp: 60 capsule, Rfl: 1    estradioL (ESTRACE) 0.01 % (0.1 mg/gram) vaginal cream, Place 1 g vaginally once daily., Disp: 30 g, Rfl: 12    fluconazole (DIFLUCAN) 150 MG Tab, Take 1 tablet (150 mg total) by mouth every 72 hours as needed (vaginal yeast)., Disp: 3 tablet, Rfl: 0    gabapentin (NEURONTIN) 300 MG capsule, Take 1 capsule (300 mg total) by mouth 2 (two) times daily., Disp: 60 capsule, Rfl: 11    HYDROcodone-acetaminophen (NORCO)  mg per tablet, Take 1 tablet by mouth every 12 (twelve) hours as needed  for Pain., Disp: 21 tablet, Rfl: 0    loperamide (IMODIUM) 2 mg capsule, Take 2 mg by mouth daily as needed for Diarrhea., Disp: , Rfl:     mirtazapine (REMERON SOL-TAB) 30 MG disintegrating tablet, Take 1 tablet (30 mg total) by mouth nightly., Disp: 90 tablet, Rfl: 2    multivitamin (THERAGRAN) per tablet, Take 1 tablet by mouth once daily., Disp: , Rfl:     nadoloL (CORGARD) 40 MG tablet, Take 1 tablet (40 mg total) by mouth once daily., Disp: 30 tablet, Rfl: 11    pantoprazole (PROTONIX) 40 MG tablet, Take 1 tablet (40 mg total) by mouth 2 (two) times daily., Disp: 60 tablet, Rfl: 12    promethazine (PHENERGAN) 25 MG tablet, TAKE 1 TABLET BY MOUTH EVERY 8 HOURS FOR NAUSEA, Disp: 30 tablet, Rfl: 1    triamcinolone acetonide 0.1% (KENALOG) 0.1 % cream, Apply topically 2 (two) times daily., Disp: , Rfl:     valACYclovir (VALTREX) 1000 MG tablet, Take one tablet by mouth twice daily for 5 days, Disp: 10 tablet, Rfl: 3    valACYclovir (VALTREX) 500 MG tablet, Take 1 tablet (500 mg total) by mouth once daily., Disp: 90 tablet, Rfl: 3    vedolizumab (ENTYVIO) 300 mg SolR injection, Inject 300 mg into the vein., Disp: , Rfl:     zolpidem (AMBIEN) 10 mg Tab, Take 1 tablet (10 mg total) by mouth every evening., Disp: 30 tablet, Rfl: 0    aspirin (ECOTRIN) 81 MG EC tablet, Take 1 tablet (81 mg total) by mouth once daily. For 6 weeks starting the day after surgery. (Patient not taking: Reported on 9/19/2023), Disp: 42 tablet, Rfl: 0    clonazePAM (KLONOPIN) 1 MG tablet, Take 1 tablet (1 mg total) by mouth 2 (two) times daily., Disp: 60 tablet, Rfl: 2    Current Facility-Administered Medications:     estradiol valerate (DELESTROGEN) injection 20 mg/mL, 20 mg, Intramuscular, Q30 Days, Gilson El MD, 20 mg at 12/30/22 0902    estradiol valerate injection 20 mg, 20 mg, Intramuscular, Q30 Days, Gilson El MD, 20 mg at 05/11/23 1327    Review of Systems   Constitutional:  Negative for chills and fever.  "  Respiratory:  Negative for cough and shortness of breath.    Cardiovascular:  Negative for chest pain.   Gastrointestinal:  Negative for abdominal pain.   Skin:  Negative for rash.   Neurological:  Negative for dizziness.   Psychiatric/Behavioral:  The patient is nervous/anxious.        Objective:   /80 (BP Location: Right arm, Patient Position: Sitting)   Pulse 70   Ht 5' 1" (1.549 m)   Wt 44.9 kg (99 lb)   LMP 03/30/2015   SpO2 99%   BMI 18.71 kg/m²      Physical Exam  Vitals reviewed.   Constitutional:       Appearance: She is well-developed.   HENT:      Head: Normocephalic and atraumatic.   Eyes:      Conjunctiva/sclera: Conjunctivae normal.   Cardiovascular:      Rate and Rhythm: Normal rate.   Pulmonary:      Effort: Pulmonary effort is normal. No respiratory distress.   Skin:     General: Skin is warm and dry.      Findings: No rash.   Neurological:      Mental Status: She is alert and oriented to person, place, and time.      Coordination: Coordination normal.   Psychiatric:         Behavior: Behavior normal.         Assessment & Plan     Problem List Items Addressed This Visit          Psychiatric    Generalized anxiety disorder - Primary    Current Assessment & Plan     Continue chronic benzo use to address this. She is certainly dependent on this but will try to decrease the dose/freq         Relevant Medications    clonazePAM (KLONOPIN) 1 MG tablet       Cardiac/Vascular    History of prolonged Q-T interval on ECG    Current Assessment & Plan     Has given problems with SSRIs            Renal/    Urinary tract infection with hematuria    Current Assessment & Plan     Symptoms have resolved from recent bout         Hematuria    Current Assessment & Plan     Resolving. Finishing keflex.               Immunizations Administered on Date of Encounter - 9/29/2023       No immunizations on file.             No follow-ups on file.      Disclaimer:  This note may have been prepared using voice " recognition software, it may have not been extensively proofed, as such there could be errors within the text such as sound alike errors.

## 2023-09-30 NOTE — PROGRESS NOTES
"  [facility]   Physical Therapy Treatment Note     Name: Ivy Salgado  Clinic Number: [id number]    Therapy Diagnosis:   Encounter Diagnosis   Name Primary?    Decreased range of motion of left shoulder Yes               Physician: Luis Madden DO    Visit Date: 9/29/2023       Evaluation Date: 8/8/2023  Authorization Period Expiration: 7/6/2024  Plan of Care Expiration: 12/31/2023  Progress Note Due: 9/10/2023  Visit # / Visits authorized: 1/ 20  Foto: 1/1      Time In: 0900  Time Out: 1000  Total Billable Time: 60 minutes (PT tech extender used this session)     DOS: 7/18/2023  S/p: left  1. Total shoulder arthroplasty (complex, -22 modifier)  2. Shoulder lysis of adhesions  3. Shoulder subpectoral biceps tenodesis (CPT 02951)  Post-Op Precautions: We will follow the shoulder arthroplasty guidelines. The patient remained in a sling for 6 weeks. We will start PT at the 3-week martina.         Precautions: Fall    SUBJECTIVE     Pt reports: her arm still hurts to the touch but overall doing well.     She was compliant with home exercise program.  Response to previous treatment: improvement in pain at rest   Functional change: able to josefa being out of sling for a little bit     Pain: 3/10  Location: left shoulder      OBJECTIVE     Shoulder Flexion AAROM: 120 ; ER PROM to 20  Shoulder PROM Flexion 140    Treatment       Ivy received the treatments listed below:      Therapeutic exercises to develop ROM for 18 minutes including:  Pulleys 5m FL and scapiton  UBE 5/5 lvl 2  Wall slides 20x     Manual therapy techniques: Joint mobilizations and Soft tissue Mobilization were applied to the: L shoulder for 12 minutes, including:  Skilled PROM of L shoulder within tolerance and protocol in flexion/ER/IR, elbow flex/ext, wrist sup/pronation    Neuromuscular re-education activities to improve: Posture for 30 min seated AROM ER  AAROM wand #2 ER supine 3x10 5" holds at 45 and 90  #2 Wand " chest press into FL 30x  Resisted IR otb walk outs 3x15   Resisted ER isometric holds ytb 5x10s   Standing want flexion against wall 30x         Patient Education and Home Exercises     Home Exercises Provided and Patient Education Provided     Education provided:   - Add new exercises     Written Home Exercises Provided: yes. Exercises were reviewed and Ivy was able to demonstrate them prior to the end of the session.  Ivy demonstrated good  understanding of the education provided. See EMR under Patient Instructions for exercises provided during therapy sessions    ASSESSMENT   The patient with noted fatigue with overhead exercises that focused on GHJ movement vs lumbar extension/scap elevation. The patient overall is progressing well within her abilities and shows compliance with her HEP.     Ivy Is progressing well towards her goals.     Pt prognosis is Fair.     Pt will continue to benefit from skilled outpatient physical therapy to address the deficits listed in the problem list box on initial evaluation, provide pt/family education and to maximize pt's level of independence in the home and community environment.     Pt's spiritual, cultural and educational needs considered and pt agreeable to plan of care and goals.     Anticipated barriers to physical therapy: none     Goals:   Short Term Goals: 12 weeks  1. Pt will be compliant with HEP 50% of prescribed amount.   2. The pt to demo improvement in R shoulder PROM to by 80% of the L  3.  The pt to demo tolerance to being out of the sling for 24 hours with pain <2/10     Long Term Goals:  36 weeks   Pt will be compliant with % of prescribed amount.   The pt to improvement in AROM of L shoulder within 80% of R shoulder to improve tolerance to OH movement   The pt to  Demo at least 4+/5 of RTC muscle testing to demo improvement in tolerance to activity  The pt to tolerate lifting 50# from the ground to waist height per job requirement description    The pt will report full participation in ADLs and IADLs without restrictions related to L Shoulder.      PLAN     Follow TSA procedure     Dorothy Basurto, PT

## 2023-10-02 ENCOUNTER — CLINICAL SUPPORT (OUTPATIENT)
Dept: REHABILITATION | Facility: HOSPITAL | Age: 43
End: 2023-10-02
Payer: COMMERCIAL

## 2023-10-02 DIAGNOSIS — M25.512 CHRONIC LEFT SHOULDER PAIN: ICD-10-CM

## 2023-10-02 DIAGNOSIS — Z98.890 S/P SHOULDER SURGERY: ICD-10-CM

## 2023-10-02 DIAGNOSIS — M25.612 DECREASED RANGE OF MOTION OF LEFT SHOULDER: Primary | ICD-10-CM

## 2023-10-02 DIAGNOSIS — G89.29 CHRONIC LEFT SHOULDER PAIN: ICD-10-CM

## 2023-10-02 PROCEDURE — 97110 THERAPEUTIC EXERCISES: CPT

## 2023-10-02 PROCEDURE — 97140 MANUAL THERAPY 1/> REGIONS: CPT

## 2023-10-02 PROCEDURE — 97112 NEUROMUSCULAR REEDUCATION: CPT

## 2023-10-02 RX ORDER — HYDROCODONE BITARTRATE AND ACETAMINOPHEN 10; 325 MG/1; MG/1
1 TABLET ORAL EVERY 12 HOURS PRN
Qty: 14 TABLET | Refills: 0 | Status: SHIPPED | OUTPATIENT
Start: 2023-10-02 | End: 2023-10-12 | Stop reason: SDUPTHER

## 2023-10-02 RX ORDER — CYCLOBENZAPRINE HCL 10 MG
10 TABLET ORAL NIGHTLY
Qty: 15 TABLET | Refills: 0 | Status: SHIPPED | OUTPATIENT
Start: 2023-10-02 | End: 2023-10-14 | Stop reason: SDUPTHER

## 2023-10-02 NOTE — PROGRESS NOTES
[facility]   Physical Therapy Treatment Note     Name: Ivy Salgado  Clinic Number: [id number]    Therapy Diagnosis:   Encounter Diagnosis   Name Primary?    Decreased range of motion of left shoulder Yes     Physician: Luis Madden DO    Visit Date: 10/2/2023       Evaluation Date: 8/8/2023  Authorization Period Expiration: 7/6/2024  Plan of Care Expiration: 12/31/2023  Progress Note Due: 9/10/2023  Visit # / Visits authorized: 15/ 20  Foto: 1/1      Time In: 1100  Time Out: 1200  Total Billable Time: 60 minutes (PT tech extender used this session)     DOS: 7/18/2023  S/p: left  1. Total shoulder arthroplasty (complex, -22 modifier)  2. Shoulder lysis of adhesions  3. Shoulder subpectoral biceps tenodesis (CPT 11028)  Post-Op Precautions: We will follow the shoulder arthroplasty guidelines. The patient remained in a sling for 6 weeks. We will start PT at the 3-week martina.         Precautions: none    SUBJECTIVE     Pt reports: she felt really sore after last session into the weekend but is OK today.     She was compliant with home exercise program.  Response to previous treatment: improvement in pain at rest   Functional change: able to josefa being out of sling for a little bit     Pain: 3/10  Location: left shoulder      OBJECTIVE     Shoulder Flexion AROM: 90 ; ER AROM to 20  Shoulder PROM Flexion 150 after manual    Treatment       Ivy received the treatments listed below:      Therapeutic exercises to develop ROM for 12 minutes including:  UBE 5/5 lvl 2  Education    Manual therapy techniques: Joint mobilizations and Soft tissue Mobilization were applied to the: L shoulder for 12 minutes, including:  Skilled PROM of L shoulder within tolerance and protocol in flexion/ER/IR, elbow flex/ext, wrist sup/pronation  Gentle post GHJ mobs grade III     Neuromuscular re-education activities to improve: Posture for 30 min seated AROM ER  Wand flexion with head of table elevated 30x  2#   #2 Wand chest press into FL 30x  Resisted IR otb walk outs 3x15   Resisted ER isometric holds ytb 5x10s   Wall slides 30x         Patient Education and Home Exercises     Home Exercises Provided and Patient Education Provided     Education provided:   - Add new exercises     Written Home Exercises Provided: yes. Exercises were reviewed and Ivy was able to demonstrate them prior to the end of the session.  Ivy demonstrated good  understanding of the education provided. See EMR under Patient Instructions for exercises provided during therapy sessions    ASSESSMENT   The patient with good progression of PROM seen today during therapy. Cont to tolerate exercsies well. Focus on Er and Flexion should cont as ER is limited.     Ivy Is progressing well towards her goals.     Pt prognosis is Fair.     Pt will continue to benefit from skilled outpatient physical therapy to address the deficits listed in the problem list box on initial evaluation, provide pt/family education and to maximize pt's level of independence in the home and community environment.     Pt's spiritual, cultural and educational needs considered and pt agreeable to plan of care and goals.     Anticipated barriers to physical therapy: none     Goals:   Short Term Goals: 12 weeks  1. Pt will be compliant with HEP 50% of prescribed amount.   2. The pt to demo improvement in R shoulder PROM to by 80% of the L  3.  The pt to demo tolerance to being out of the sling for 24 hours with pain <2/10     Long Term Goals:  36 weeks   Pt will be compliant with % of prescribed amount.   The pt to improvement in AROM of L shoulder within 80% of R shoulder to improve tolerance to OH movement   The pt to  Demo at least 4+/5 of RTC muscle testing to demo improvement in tolerance to activity  The pt to tolerate lifting 50# from the ground to waist height per job requirement description   The pt will report full participation in ADLs and IADLs without  restrictions related to L Shoulder.      PLAN     Follow TSA procedure     Dorothy Basurto, PT

## 2023-10-03 ENCOUNTER — PATIENT MESSAGE (OUTPATIENT)
Dept: REHABILITATION | Facility: HOSPITAL | Age: 43
End: 2023-10-03
Payer: COMMERCIAL

## 2023-10-03 NOTE — ASSESSMENT & PLAN NOTE
Continue chronic benzo use to address this. She is certainly dependent on this but will try to decrease the dose/freq

## 2023-10-04 ENCOUNTER — CLINICAL SUPPORT (OUTPATIENT)
Dept: REHABILITATION | Facility: HOSPITAL | Age: 43
End: 2023-10-04
Payer: COMMERCIAL

## 2023-10-04 DIAGNOSIS — M25.612 DECREASED RANGE OF MOTION OF LEFT SHOULDER: Primary | ICD-10-CM

## 2023-10-04 PROCEDURE — 97112 NEUROMUSCULAR REEDUCATION: CPT

## 2023-10-04 PROCEDURE — 97110 THERAPEUTIC EXERCISES: CPT

## 2023-10-04 PROCEDURE — 97140 MANUAL THERAPY 1/> REGIONS: CPT

## 2023-10-04 NOTE — PROGRESS NOTES
[facility]   Physical Therapy Treatment Note     Name: Ivy Salgado  Clinic Number: [id number]    Therapy Diagnosis:   Encounter Diagnosis   Name Primary?    Decreased range of motion of left shoulder Yes     Physician: Luis Madden DO    Visit Date: 10/4/2023       Evaluation Date: 8/8/2023  Authorization Period Expiration: 7/6/2024  Plan of Care Expiration: 12/31/2023  Progress Note Due: 9/10/2023  Visit # / Visits authorized: 15/ 20  Foto: 1/1      Time In: 1100  Time Out: 1200  Total Billable Time: 60 minutes (PT tech extender used this session)     DOS: 7/18/2023  S/p: left  1. Total shoulder arthroplasty (complex, -22 modifier)  2. Shoulder lysis of adhesions  3. Shoulder subpectoral biceps tenodesis (CPT 15204)  Post-Op Precautions: We will follow the shoulder arthroplasty guidelines. The patient remained in a sling for 6 weeks. We will start PT at the 3-week martina.         Precautions: none    SUBJECTIVE     Pt reports: The shoulder is feeling ok, pt noticed resent onset of radicular sx to the lateral elbow    She was compliant with home exercise program.  Response to previous treatment: improvement in pain at rest   Functional change: able to josefa being out of sling for a little bit     Pain: 3/10  Location: left shoulder      OBJECTIVE     Shoulder Flexion AROM: 90 ; ER AROM to 20  Shoulder PROM Flexion 150 after manual    Spurling's + on R sidebend      Treatment       Ivy received the treatments listed below:      Therapeutic exercises to develop ROM for 10 minutes including:  UBE 5/5 lvl 2      Manual therapy techniques: Joint mobilizations and Soft tissue Mobilization were applied to the: L shoulder for 21 minutes, including:  Skilled PROM of L shoulder within tolerance and protocol in flexion/ER/IR, elbow flex/ext, wrist sup/pronation  Gentle post GHJ mobs grade III   C2-5 side glides   STM / pinning of lev scap and upper trap    Neuromuscular re-education  activities to improve: Posture for 29 min seated AROM ER  Wand flexion with head of table elevated 30x 2#   #3 Wand chest press into FL 2x15  #3 Wand ER with active assist OP at EROM 30x    NP  Resisted IR otb walk outs 3x15   Resisted ER isometric holds ytb 5x10s   Wall slides 30x         Patient Education and Home Exercises     Home Exercises Provided and Patient Education Provided     Education provided:   - Add new exercises     Written Home Exercises Provided: yes. Exercises were reviewed and Ivy was able to demonstrate them prior to the end of the session.  Ivy demonstrated good  understanding of the education provided. See EMR under Patient Instructions for exercises provided during therapy sessions    ASSESSMENT   The patient presented today with c/o of increased radicular sx to the lateral elbow with a positive R side bend on Spurling's, pt verbalized and improvement in radicular sx with manual. Pt able to tolerate increase in resistance in supine AAROM with pt report of improved comfort during activity by breaking reps up with ER with self overpressure     Ivy Is progressing well towards her goals.     Pt prognosis is Fair.     Pt will continue to benefit from skilled outpatient physical therapy to address the deficits listed in the problem list box on initial evaluation, provide pt/family education and to maximize pt's level of independence in the home and community environment.     Pt's spiritual, cultural and educational needs considered and pt agreeable to plan of care and goals.     Anticipated barriers to physical therapy: none     Goals:   Short Term Goals: 12 weeks  1. Pt will be compliant with HEP 50% of prescribed amount.   2. The pt to demo improvement in R shoulder PROM to by 80% of the L  3.  The pt to demo tolerance to being out of the sling for 24 hours with pain <2/10     Long Term Goals:  36 weeks   Pt will be compliant with % of prescribed amount.   The pt to improvement  in AROM of L shoulder within 80% of R shoulder to improve tolerance to OH movement   The pt to  Demo at least 4+/5 of RTC muscle testing to demo improvement in tolerance to activity  The pt to tolerate lifting 50# from the ground to waist height per job requirement description   The pt will report full participation in ADLs and IADLs without restrictions related to L Shoulder.      PLAN     Follow TSA procedure     Renan Barrett, PT, DPT

## 2023-10-07 ENCOUNTER — PATIENT MESSAGE (OUTPATIENT)
Dept: SPORTS MEDICINE | Facility: CLINIC | Age: 43
End: 2023-10-07
Payer: COMMERCIAL

## 2023-10-07 RX ORDER — VALACYCLOVIR HYDROCHLORIDE 500 MG/1
500 TABLET, FILM COATED ORAL DAILY
Qty: 90 TABLET | Refills: 3 | Status: SHIPPED | OUTPATIENT
Start: 2023-10-07 | End: 2024-10-06

## 2023-10-09 ENCOUNTER — CLINICAL SUPPORT (OUTPATIENT)
Dept: REHABILITATION | Facility: HOSPITAL | Age: 43
End: 2023-10-09
Payer: COMMERCIAL

## 2023-10-09 DIAGNOSIS — M25.612 DECREASED RANGE OF MOTION OF LEFT SHOULDER: Primary | ICD-10-CM

## 2023-10-09 PROCEDURE — 97110 THERAPEUTIC EXERCISES: CPT

## 2023-10-09 PROCEDURE — 97140 MANUAL THERAPY 1/> REGIONS: CPT

## 2023-10-09 PROCEDURE — 97112 NEUROMUSCULAR REEDUCATION: CPT

## 2023-10-09 NOTE — PROGRESS NOTES
[facility]   Physical Therapy Treatment Note     Name: Ivy Salgado  Clinic Number: [id number]    Therapy Diagnosis:   Encounter Diagnosis   Name Primary?    Decreased range of motion of left shoulder Yes     Physician: Luis Madden DO    Visit Date: 10/9/2023       Evaluation Date: 8/8/2023  Authorization Period Expiration: 7/6/2024  Plan of Care Expiration: 12/31/2023  Progress Note Due: 9/10/2023  Visit # / Visits authorized: 17/ 20  Foto: 1/1      Time In: 1100  Time Out: 1200  Total Billable Time: 60 minutes (PT tech extender used this session)     DOS: 7/18/2023  S/p: left  1. Total shoulder arthroplasty (complex, -22 modifier)  2. Shoulder lysis of adhesions  3. Shoulder subpectoral biceps tenodesis (CPT 18034)  Post-Op Precautions: We will follow the shoulder arthroplasty guidelines. The patient remained in a sling for 6 weeks. We will start PT at the 3-week martina.         Precautions: none    SUBJECTIVE     Pt reports:she is finally feeling a bit better     She was compliant with home exercise program.  Response to previous treatment: improvement in pain at rest   Functional change: able to josefa being out of sling for a little bit     Pain: 3/10  Location: left shoulder      OBJECTIVE     Shoulder Flexion AROM: 95 ; ER AROM to 20  Shoulder PROM Flexion 155 after manual    Spurling's + on R sidebend      Treatment       Ivy received the treatments listed below:      Therapeutic exercises to develop ROM for 23 minutes including:  UBE 5/5 lvl 4  Bicep curls 3# 4x8   Slot Machine 40x   Supine AROM flexion 20x   Supine Flexion 1# 20x     Manual therapy techniques: Joint mobilizations and Soft tissue Mobilization were applied to the: L shoulder for 12 minutes, including:  Skilled PROM of L shoulder within tolerance and protocol in flexion/ER/IR, elbow flex/ext, wrist sup/pronation  Grade II mobs GHJ   METs for post/ant cuff activation     Neuromuscular re-education  activities to improve: motor control for 25 min  Resisted ER isometrics 15x 10s holds   Resisted IR otb 3x15   Lower trap level 1 standing 5x5         Patient Education and Home Exercises     Home Exercises Provided and Patient Education Provided     Education provided:   - Add new exercises     Written Home Exercises Provided: yes. Exercises were reviewed and Ivy was able to demonstrate them prior to the end of the session.  Ivy demonstrated good  understanding of the education provided. See EMR under Patient Instructions for exercises provided during therapy sessions    ASSESSMENT   The patient with excellent tolerance to treatment today that progressed her exercises to involve more AROM exercises. The patient with a noticeable decrease in scap elevation during AROM shoulder ROM.     Ivy Is progressing well towards her goals.     Pt prognosis is Fair.     Pt will continue to benefit from skilled outpatient physical therapy to address the deficits listed in the problem list box on initial evaluation, provide pt/family education and to maximize pt's level of independence in the home and community environment.     Pt's spiritual, cultural and educational needs considered and pt agreeable to plan of care and goals.     Anticipated barriers to physical therapy: none     Goals:   Short Term Goals: 12 weeks  1. Pt will be compliant with HEP 50% of prescribed amount.   2. The pt to demo improvement in R shoulder PROM to by 80% of the L  3.  The pt to demo tolerance to being out of the sling for 24 hours with pain <2/10     Long Term Goals:  36 weeks   Pt will be compliant with % of prescribed amount.   The pt to improvement in AROM of L shoulder within 80% of R shoulder to improve tolerance to OH movement   The pt to  Demo at least 4+/5 of RTC muscle testing to demo improvement in tolerance to activity  The pt to tolerate lifting 50# from the ground to waist height per job requirement description   The pt  will report full participation in ADLs and IADLs without restrictions related to L Shoulder.      PLAN     Follow TSA procedure     Dorothy Basurto, PT, DPT

## 2023-10-10 ENCOUNTER — PATIENT MESSAGE (OUTPATIENT)
Dept: SPORTS MEDICINE | Facility: CLINIC | Age: 43
End: 2023-10-10
Payer: COMMERCIAL

## 2023-10-11 ENCOUNTER — CLINICAL SUPPORT (OUTPATIENT)
Dept: REHABILITATION | Facility: HOSPITAL | Age: 43
End: 2023-10-11
Payer: COMMERCIAL

## 2023-10-11 DIAGNOSIS — M25.612 DECREASED RANGE OF MOTION OF LEFT SHOULDER: Primary | ICD-10-CM

## 2023-10-11 PROCEDURE — 97140 MANUAL THERAPY 1/> REGIONS: CPT

## 2023-10-11 PROCEDURE — 97110 THERAPEUTIC EXERCISES: CPT

## 2023-10-11 PROCEDURE — 97112 NEUROMUSCULAR REEDUCATION: CPT

## 2023-10-12 DIAGNOSIS — G89.29 CHRONIC LEFT SHOULDER PAIN: ICD-10-CM

## 2023-10-12 DIAGNOSIS — M25.512 CHRONIC LEFT SHOULDER PAIN: ICD-10-CM

## 2023-10-12 DIAGNOSIS — Z98.890 S/P SHOULDER SURGERY: ICD-10-CM

## 2023-10-12 RX ORDER — HYDROCODONE BITARTRATE AND ACETAMINOPHEN 10; 325 MG/1; MG/1
1 TABLET ORAL EVERY 12 HOURS PRN
Qty: 14 TABLET | Refills: 0 | Status: SHIPPED | OUTPATIENT
Start: 2023-10-12 | End: 2023-10-24 | Stop reason: SDUPTHER

## 2023-10-12 NOTE — PROGRESS NOTES
[facility]   Physical Therapy Treatment Note     Name: Ivy Salgado  Clinic Number: [id number]    Therapy Diagnosis:   Encounter Diagnosis   Name Primary?    Decreased range of motion of left shoulder Yes     Physician: Luis Madden DO    Visit Date: 10/11/2023       Evaluation Date: 8/8/2023  Authorization Period Expiration: 7/6/2024  Plan of Care Expiration: 12/31/2023  Progress Note Due: 9/10/2023  Visit # / Visits authorized: 18/ 20  Foto: 1/1      Time In: 1100  Time Out: 1200  Total Billable Time: 60 minutes (PT tech extender used this session)     DOS: 7/18/2023  S/p: left  1. Total shoulder arthroplasty (complex, -22 modifier)  2. Shoulder lysis of adhesions  3. Shoulder subpectoral biceps tenodesis (CPT 40653)  Post-Op Precautions: We will follow the shoulder arthroplasty guidelines. The patient remained in a sling for 6 weeks. We will start PT at the 3-week martina.         Precautions: none    SUBJECTIVE     Pt reports:he was sore following last visit but is doing OK overall.     She was compliant with home exercise program.  Response to previous treatment: improvement in pain at rest   Functional change: able to josefa being out of sling for a little bit     Pain: 3/10  Location: left shoulder      OBJECTIVE     Shoulder Flexion AROM: 100 ; ER AROM to 20  Shoulder PROM Flexion 155 after manual    Treatment       Ivy received the treatments listed below:      Therapeutic exercises to develop ROM for 23 minutes including:  UBE 5/5 lvl 4  Bicep curls 3# 4x8   Slot Machine 40x   Pulleys 5m     Manual therapy techniques: Joint mobilizations and Soft tissue Mobilization were applied to the: L shoulder for 12 minutes, including:  Skilled PROM of L shoulder within tolerance and protocol in flexion/ER/IR, elbow flex/ext, wrist sup/pronation  Grade II mobs GHJ   METs for post/ant cuff activation     Neuromuscular re-education activities to improve: motor control for 25  min  Resisted ER isometrics 15x 10s holds   Resisted IR otb 3x15   Lower trap level 1 standing 5x5   ABCs on the wall 3x         Patient Education and Home Exercises     Home Exercises Provided and Patient Education Provided     Education provided:   - Add new exercises     Written Home Exercises Provided: yes. Exercises were reviewed and Ivy was able to demonstrate them prior to the end of the session.  Ivy demonstrated good  understanding of the education provided. See EMR under Patient Instructions for exercises provided during therapy sessions    ASSESSMENT   The patient with cont good tolerance to exercises, able to complete wihtout sig increase in pain, limited in flexion and ER though it is steadily improving. 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     Ivy Is progressing well towards her goals.     Pt prognosis is Fair.     Pt will continue to benefit from skilled outpatient physical therapy to address the deficits listed in the problem list box on initial evaluation, provide pt/family education and to maximize pt's level of independence in the home and community environment.     Pt's spiritual, cultural and educational needs considered and pt agreeable to plan of care and goals.     Anticipated barriers to physical therapy: none     Goals:   Short Term Goals: 12 weeks  1. Pt will be compliant with HEP 50% of prescribed amount.   2. The pt to demo improvement in R shoulder PROM to by 80% of the L  3.  The pt to demo tolerance to being out of the sling for 24 hours with pain <2/10     Long Term Goals:  36 weeks   Pt will be compliant with % of prescribed amount.   The pt to improvement in AROM of L shoulder within 80% of R shoulder to improve tolerance to OH movement   The pt to  Demo at least 4+/5 of RTC muscle testing to demo improvement in tolerance to activity  The pt to tolerate lifting 50# from the ground  to waist height per job requirement description   The pt will report full participation in ADLs and IADLs without restrictions related to L Shoulder.      PLAN     Follow TSA procedure     Dorothy Basutro, PT, DPT

## 2023-10-13 ENCOUNTER — TELEPHONE (OUTPATIENT)
Dept: INTERNAL MEDICINE | Facility: CLINIC | Age: 43
End: 2023-10-13
Payer: COMMERCIAL

## 2023-10-13 ENCOUNTER — PATIENT MESSAGE (OUTPATIENT)
Dept: UROLOGY | Facility: CLINIC | Age: 43
End: 2023-10-13
Payer: COMMERCIAL

## 2023-10-13 ENCOUNTER — LAB VISIT (OUTPATIENT)
Dept: LAB | Facility: HOSPITAL | Age: 43
End: 2023-10-13
Payer: COMMERCIAL

## 2023-10-13 ENCOUNTER — PATIENT MESSAGE (OUTPATIENT)
Dept: INTERNAL MEDICINE | Facility: CLINIC | Age: 43
End: 2023-10-13
Payer: COMMERCIAL

## 2023-10-13 DIAGNOSIS — R39.9 UTI SYMPTOMS: Primary | ICD-10-CM

## 2023-10-13 DIAGNOSIS — R39.9 UTI SYMPTOMS: ICD-10-CM

## 2023-10-13 PROCEDURE — 87086 URINE CULTURE/COLONY COUNT: CPT | Performed by: NURSE PRACTITIONER

## 2023-10-13 NOTE — TELEPHONE ENCOUNTER
----- Message from Loretta Coffey sent at 10/13/2023 12:53 PM CDT -----   Name of Who is Calling:     What is the request in detail: patient request call back in reference to UTI / patient want to know if can drop specimen and [facility] lab Please contact to further discuss and advise      Can the clinic reply by [facility]:     What Number to Call Back if not in [facility]:  [phone]

## 2023-10-13 NOTE — TELEPHONE ENCOUNTER
Patient sent a message in a separate encounter saying that she has already dropped a specimen off to [facility].

## 2023-10-14 DIAGNOSIS — G89.29 CHRONIC LEFT SHOULDER PAIN: ICD-10-CM

## 2023-10-14 DIAGNOSIS — Z98.890 S/P SHOULDER SURGERY: ICD-10-CM

## 2023-10-14 DIAGNOSIS — M25.512 CHRONIC LEFT SHOULDER PAIN: ICD-10-CM

## 2023-10-14 LAB — BACTERIA UR CULT: NO GROWTH

## 2023-10-16 ENCOUNTER — CLINICAL SUPPORT (OUTPATIENT)
Dept: REHABILITATION | Facility: HOSPITAL | Age: 43
End: 2023-10-16
Payer: COMMERCIAL

## 2023-10-16 ENCOUNTER — PATIENT MESSAGE (OUTPATIENT)
Dept: REHABILITATION | Facility: HOSPITAL | Age: 43
End: 2023-10-16

## 2023-10-16 DIAGNOSIS — M25.612 DECREASED RANGE OF MOTION OF LEFT SHOULDER: Primary | ICD-10-CM

## 2023-10-16 DIAGNOSIS — F41.9 ANXIETY: ICD-10-CM

## 2023-10-16 DIAGNOSIS — R10.9 FLANK PAIN: Primary | ICD-10-CM

## 2023-10-16 PROCEDURE — 97140 MANUAL THERAPY 1/> REGIONS: CPT

## 2023-10-16 PROCEDURE — 97112 NEUROMUSCULAR REEDUCATION: CPT

## 2023-10-16 PROCEDURE — 97110 THERAPEUTIC EXERCISES: CPT

## 2023-10-16 RX ORDER — TAMSULOSIN HYDROCHLORIDE 0.4 MG/1
0.4 CAPSULE ORAL DAILY
Qty: 30 CAPSULE | Refills: 1 | Status: SHIPPED | OUTPATIENT
Start: 2023-10-16 | End: 2024-03-14 | Stop reason: SDUPTHER

## 2023-10-16 NOTE — TELEPHONE ENCOUNTER
This is not overly concerning if she is passing a kidney stone.  I have sent a prescription for her to start Flomax daily. She should increase water intake as well.   I have also placed an order for CT renal stone study please schedule her next available for further evaluation of her pain.  Thanks, M

## 2023-10-16 NOTE — TELEPHONE ENCOUNTER
No care due was identified.  [facility] Embedded Care Due Messages. Reference number: [id number].   10/16/2023 3:06:01 PM CDT

## 2023-10-17 RX ORDER — ZOLPIDEM TARTRATE 10 MG/1
10 TABLET ORAL NIGHTLY
Qty: 30 TABLET | Refills: 0 | Status: SHIPPED | OUTPATIENT
Start: 2023-10-17 | End: 2023-11-15 | Stop reason: SDUPTHER

## 2023-10-19 ENCOUNTER — TELEPHONE (OUTPATIENT)
Dept: UROLOGY | Facility: CLINIC | Age: 43
End: 2023-10-19
Payer: COMMERCIAL

## 2023-10-19 ENCOUNTER — CLINICAL SUPPORT (OUTPATIENT)
Dept: REHABILITATION | Facility: HOSPITAL | Age: 43
End: 2023-10-19
Payer: COMMERCIAL

## 2023-10-19 DIAGNOSIS — M25.612 DECREASED RANGE OF MOTION OF LEFT SHOULDER: Primary | ICD-10-CM

## 2023-10-19 PROCEDURE — 97140 MANUAL THERAPY 1/> REGIONS: CPT

## 2023-10-19 PROCEDURE — 97112 NEUROMUSCULAR REEDUCATION: CPT

## 2023-10-19 PROCEDURE — 97110 THERAPEUTIC EXERCISES: CPT

## 2023-10-22 RX ORDER — CYCLOBENZAPRINE HCL 10 MG
10 TABLET ORAL NIGHTLY
Qty: 15 TABLET | Refills: 0 | Status: SHIPPED | OUTPATIENT
Start: 2023-10-22 | End: 2023-11-15 | Stop reason: SDUPTHER

## 2023-10-23 ENCOUNTER — CLINICAL SUPPORT (OUTPATIENT)
Dept: REHABILITATION | Facility: HOSPITAL | Age: 43
End: 2023-10-23
Payer: COMMERCIAL

## 2023-10-23 DIAGNOSIS — M25.612 DECREASED RANGE OF MOTION OF LEFT SHOULDER: Primary | ICD-10-CM

## 2023-10-23 PROCEDURE — 97140 MANUAL THERAPY 1/> REGIONS: CPT

## 2023-10-23 PROCEDURE — 97112 NEUROMUSCULAR REEDUCATION: CPT

## 2023-10-23 NOTE — PROGRESS NOTES
[facility]   Physical Therapy Treatment Note     Name: Ivy Salgado  Clinic Number: [id number]    Therapy Diagnosis:   Encounter Diagnosis   Name Primary?    Decreased range of motion of left shoulder Yes     Physician: Luis Madden DO    Visit Date: 10/19/2023       Evaluation Date: 8/8/2023  Authorization Period Expiration: 7/6/2024  Plan of Care Expiration: 12/31/2023  Progress Note Due: 9/10/2023  Visit # / Visits authorized: 21/ 40  Foto: 1/1      Time In: 1100  Time Out: 1200  Total Billable Time: 45 minutes (PT tech extender used this session)     DOS: 7/18/2023  S/p: left  1. Total shoulder arthroplasty (complex, -22 modifier)  2. Shoulder lysis of adhesions  3. Shoulder subpectoral biceps tenodesis (CPT 85862)  Post-Op Precautions: We will follow the shoulder arthroplasty guidelines. The patient remained in a sling for 6 weeks. We will start PT at the 3-week martina.         Precautions: none    SUBJECTIVE     Pt reports:is doing better than the other day but still sore from work.     She was compliant with home exercise program.  Response to previous treatment: improvement in pain at rest   Functional change: able to josefa being out of sling for a little bit     Pain: 3/10  Location: left shoulder      OBJECTIVE     Shoulder Flexion AROM: 100 ; ER AROM to 20  Shoulder PROM Flexion 155 after manual    Treatment       Ivy received the treatments listed below:      Therapeutic exercises to develop ROM for 23 minutes including:  UBE 5/5 lvl 4  Bicep curls 3# 4x8   Slot Machine 40x   Pulleys 5m     Manual therapy techniques: Joint mobilizations and Soft tissue Mobilization were applied to the: L shoulder for 12 minutes, including:  Skilled PROM of L shoulder within tolerance and protocol in flexion/ER/IR, elbow flex/ext, wrist sup/pronation  Grade II mobs GHJ   METs for post/ant cuff activation     Neuromuscular re-education activities to improve: motor control for 25  min  Resisted ER isometrics 15x 10s holds   Resisted IR otb 3x15   Lower trap level 1 standing 5x5   ABCs on the wall 3x         Patient Education and Home Exercises     Home Exercises Provided and Patient Education Provided     Education provided:   - Add new exercises     Written Home Exercises Provided: yes. Exercises were reviewed and Ivy was able to demonstrate them prior to the end of the session.  Ivy demonstrated good  understanding of the education provided. See EMR under Patient Instructions for exercises provided during therapy sessions    ASSESSMENT   The patient with good ROM presentation, cont to make good progress with her AROM and PROM gains.     Ivy Is progressing well towards her goals.     Pt prognosis is Fair.     Pt will continue to benefit from skilled outpatient physical therapy to address the deficits listed in the problem list box on initial evaluation, provide pt/family education and to maximize pt's level of independence in the home and community environment.     Pt's spiritual, cultural and educational needs considered and pt agreeable to plan of care and goals.     Anticipated barriers to physical therapy: none     Goals:   Short Term Goals: 12 weeks  1. Pt will be compliant with HEP 50% of prescribed amount.   2. The pt to demo improvement in R shoulder PROM to by 80% of the L  3.  The pt to demo tolerance to being out of the sling for 24 hours with pain <2/10     Long Term Goals:  36 weeks   Pt will be compliant with % of prescribed amount.   The pt to improvement in AROM of L shoulder within 80% of R shoulder to improve tolerance to OH movement   The pt to  Demo at least 4+/5 of RTC muscle testing to demo improvement in tolerance to activity  The pt to tolerate lifting 50# from the ground to waist height per job requirement description   The pt will report full participation in ADLs and IADLs without restrictions related to L Shoulder.      PLAN     Follow TSA  procedure     Dorothy Basurto, PT, DPT

## 2023-10-23 NOTE — PROGRESS NOTES
[facility]   Physical Therapy Treatment Note     Name: Ivy Salgado  Clinic Number: [id number]    Therapy Diagnosis:   Encounter Diagnosis   Name Primary?    Decreased range of motion of left shoulder Yes     Physician: Luis Madden DO    Visit Date: 10/16/2023       Evaluation Date: 8/8/2023  Authorization Period Expiration: 7/6/2024  Plan of Care Expiration: 12/31/2023  Progress Note Due: 9/10/2023  Visit # / Visits authorized: 20/ 40  Foto: 1/1      Time In: 1500  Time Out: 1600  Total Billable Time: 60 minutes (PT tech extender used this session)     DOS: 7/18/2023  S/p: left  1. Total shoulder arthroplasty (complex, -22 modifier)  2. Shoulder lysis of adhesions  3. Shoulder subpectoral biceps tenodesis (CPT 22424)  Post-Op Precautions: We will follow the shoulder arthroplasty guidelines. The patient remained in a sling for 6 weeks. We will start PT at the 3-week martina.         Precautions: none    SUBJECTIVE     Pt reports:she is very sore from work    She was compliant with home exercise program.  Response to previous treatment: improvement in pain at rest   Functional change: able to josefa being out of sling for a little bit     Pain: 3/10  Location: left shoulder      OBJECTIVE     Shoulder Flexion AROM: 100 ; ER AROM to 20  Shoulder PROM Flexion 155 after manual    Treatment       Ivy received the treatments listed below:      Therapeutic exercises to develop ROM for 23 minutes including:  UBE 5/5 lvl 4  Bicep curls 3# 4x8   Slot Machine 40x   Pulleys 5m     Manual therapy techniques: Joint mobilizations and Soft tissue Mobilization were applied to the: L shoulder for 12 minutes, including:  Skilled PROM of L shoulder within tolerance and protocol in flexion/ER/IR, elbow flex/ext, wrist sup/pronation  Grade II mobs GHJ   METs for post/ant cuff activation     Neuromuscular re-education activities to improve: motor control for 25 min  Resisted ER isometrics 15x 10s holds    Resisted IR otb 3x15   Lower trap level 1 standing 5x5   ABCs on the wall 3x         Patient Education and Home Exercises     Home Exercises Provided and Patient Education Provided     Education provided:   - Add new exercises     Written Home Exercises Provided: yes. Exercises were reviewed and Ivy was able to demonstrate them prior to the end of the session.  Ivy demonstrated good  understanding of the education provided. See EMR under Patient Instructions for exercises provided during therapy sessions    ASSESSMENT   Added back in pulleys due to the patient being sore from work today.Otherwise tolerated therex well. Cont to prog as josefa.    Ivy Is progressing well towards her goals.     Pt prognosis is Fair.     Pt will continue to benefit from skilled outpatient physical therapy to address the deficits listed in the problem list box on initial evaluation, provide pt/family education and to maximize pt's level of independence in the home and community environment.     Pt's spiritual, cultural and educational needs considered and pt agreeable to plan of care and goals.     Anticipated barriers to physical therapy: none     Goals:   Short Term Goals: 12 weeks  1. Pt will be compliant with HEP 50% of prescribed amount.   2. The pt to demo improvement in R shoulder PROM to by 80% of the L  3.  The pt to demo tolerance to being out of the sling for 24 hours with pain <2/10     Long Term Goals:  36 weeks   Pt will be compliant with % of prescribed amount.   The pt to improvement in AROM of L shoulder within 80% of R shoulder to improve tolerance to OH movement   The pt to  Demo at least 4+/5 of RTC muscle testing to demo improvement in tolerance to activity  The pt to tolerate lifting 50# from the ground to waist height per job requirement description   The pt will report full participation in ADLs and IADLs without restrictions related to L Shoulder.      PLAN     Follow TSA procedure     Dorothy  Sb, PT, DPT

## 2023-10-24 ENCOUNTER — HOSPITAL ENCOUNTER (OUTPATIENT)
Dept: RADIOLOGY | Facility: OTHER | Age: 43
Discharge: HOME | End: 2023-10-24
Attending: NURSE PRACTITIONER
Payer: COMMERCIAL

## 2023-10-24 ENCOUNTER — OFFICE VISIT (OUTPATIENT)
Dept: PSYCHIATRY | Facility: CLINIC | Age: 43
End: 2023-10-24
Payer: COMMERCIAL

## 2023-10-24 VITALS
BODY MASS INDEX: 19.49 KG/M2 | WEIGHT: 103.19 LBS | SYSTOLIC BLOOD PRESSURE: 138 MMHG | HEART RATE: 77 BPM | DIASTOLIC BLOOD PRESSURE: 62 MMHG

## 2023-10-24 DIAGNOSIS — G89.29 CHRONIC LEFT SHOULDER PAIN: ICD-10-CM

## 2023-10-24 DIAGNOSIS — Z98.890 S/P SHOULDER SURGERY: ICD-10-CM

## 2023-10-24 DIAGNOSIS — F41.1 GENERALIZED ANXIETY DISORDER WITH PANIC ATTACKS: Primary | ICD-10-CM

## 2023-10-24 DIAGNOSIS — I45.81 LONG QT SYNDROME TYPE 3: ICD-10-CM

## 2023-10-24 DIAGNOSIS — F33.1 MODERATE EPISODE OF RECURRENT MAJOR DEPRESSIVE DISORDER: ICD-10-CM

## 2023-10-24 DIAGNOSIS — F41.0 GENERALIZED ANXIETY DISORDER WITH PANIC ATTACKS: Primary | ICD-10-CM

## 2023-10-24 DIAGNOSIS — M25.512 CHRONIC LEFT SHOULDER PAIN: ICD-10-CM

## 2023-10-24 DIAGNOSIS — R10.9 FLANK PAIN: ICD-10-CM

## 2023-10-24 PROCEDURE — 99999 PR PBB SHADOW E&M-EST. PATIENT-LVL II: CPT | Mod: PBBFAC,,,

## 2023-10-24 PROCEDURE — 90792 PSYCH DIAG EVAL W/MED SRVCS: CPT | Mod: S$GLB,,,

## 2023-10-24 PROCEDURE — 74176 CT ABD & PELVIS W/O CONTRAST: CPT | Mod: TC

## 2023-10-24 PROCEDURE — 3075F PR MOST RECENT SYSTOLIC BLOOD PRESS GE 130-139MM HG: ICD-10-PCS | Mod: CPTII,S$GLB,,

## 2023-10-24 PROCEDURE — 74176 CT RENAL STONE STUDY ABD PELVIS WO: ICD-10-PCS | Mod: 26,,, | Performed by: RADIOLOGY

## 2023-10-24 PROCEDURE — 3075F SYST BP GE 130 - 139MM HG: CPT | Mod: CPTII,S$GLB,,

## 2023-10-24 PROCEDURE — 99999 PR PBB SHADOW E&M-EST. PATIENT-LVL II: ICD-10-PCS | Mod: PBBFAC,,,

## 2023-10-24 PROCEDURE — 3078F DIAST BP <80 MM HG: CPT | Mod: CPTII,S$GLB,,

## 2023-10-24 PROCEDURE — 3078F PR MOST RECENT DIASTOLIC BLOOD PRESSURE < 80 MM HG: ICD-10-PCS | Mod: CPTII,S$GLB,,

## 2023-10-24 PROCEDURE — 90792 PR PSYCHIATRIC DIAGNOSTIC EVALUATION W/MEDICAL SERVICES: ICD-10-PCS | Mod: S$GLB,,,

## 2023-10-24 PROCEDURE — 74176 CT ABD & PELVIS W/O CONTRAST: CPT | Mod: 26,,, | Performed by: RADIOLOGY

## 2023-10-24 RX ORDER — BUPROPION HYDROCHLORIDE 100 MG/1
100 TABLET, EXTENDED RELEASE ORAL DAILY
Qty: 30 TABLET | Refills: 1 | Status: ON HOLD | OUTPATIENT
Start: 2023-10-24 | End: 2023-10-31 | Stop reason: HOSPADM

## 2023-10-24 NOTE — PROGRESS NOTES
[facility]   Physical Therapy Treatment Note     Name: Ivy Salgado  Clinic Number: [id number]    Therapy Diagnosis:   Encounter Diagnosis   Name Primary?    Decreased range of motion of left shoulder Yes     Physician: Luis Madden DO    Visit Date: 10/23/2023       Evaluation Date: 8/8/2023  Authorization Period Expiration: 7/6/2024  Plan of Care Expiration: 12/31/2023  Progress Note Due: 9/10/2023  Visit # / Visits authorized: 22/ 40  Foto: 1/1      Time In: 1500  Time Out: 1600  Total Billable Time: 30 minutes  DOS: 7/18/2023  S/p: left  1. Total shoulder arthroplasty (complex, -22 modifier)  2. Shoulder lysis of adhesions  3. Shoulder subpectoral biceps tenodesis (CPT 96650)  Post-Op Precautions: We will follow the shoulder arthroplasty guidelines. The patient remained in a sling for 6 weeks. We will start PT at the 3-week martina.         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     She was compliant with home exercise program.  Response to previous treatment: improvement in pain at rest   Functional change: able to josefa being out of sling for a little bit     Pain: 3/10  Location: left shoulder      OBJECTIVE     Shoulder Flexion AROM: 100 ; ER AROM to 20  Shoulder PROM Flexion 155 after manual    Treatment       Ivy received the treatments listed below:      Therapeutic exercises to develop ROM for 23 minutes including:  UBE 5/5 lvl 4  Slot Machine 40x   Pulleys 5m     Manual therapy techniques: Joint mobilizations and Soft tissue Mobilization were applied to the: L shoulder for 12 minutes, including:  Skilled PROM of L shoulder within tolerance and protocol in flexion/ER/IR, elbow flex/ext, wrist sup/pronation  Grade II mobs GHJ   METs for post/ant cuff activation     Neuromuscular  re-education activities to improve: motor control for 25 min  Resisted ER isometrics 15x 10s holds   Resisted IR otb 3x15   Wand flexion slight elevation 20x 3#  Wand flexion supine 3# 30x   ABCs on the wall 3x NPT        Patient Education and Home Exercises     Home Exercises Provided and Patient Education Provided     Education provided:   - Add new exercises     Written Home Exercises Provided: yes. Exercises were reviewed and Ivy was able to demonstrate them prior to the end of the session.  Ivy demonstrated good  understanding of the education provided. 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 The patient and this PT discussed strategies to off load the L UE to improve her ability to make progress post operatively. The patient with good understanding of education and will implement changes.     Ivy Is progressing well towards her goals.     Pt prognosis is Fair.     Pt will continue to benefit from skilled outpatient physical therapy to address the deficits listed in the problem list box on initial evaluation, provide pt/family education and to maximize pt's level of independence in the home and community environment.     Pt's spiritual, cultural and educational needs considered and pt agreeable to plan of care and goals.     Anticipated barriers to physical therapy: none     Goals:   Short Term Goals: 12 weeks  1. Pt will be compliant with HEP 50% of prescribed amount.   2. The pt to demo improvement in R shoulder PROM to by 80% of the L  3.  The pt to demo tolerance to being out of the sling for 24 hours with pain <2/10     Long Term Goals:  36 weeks   Pt will be compliant with % of prescribed amount.   The pt to improvement in AROM of L shoulder within  80% of R shoulder to improve tolerance to OH movement   The pt to  Demo at least 4+/5 of RTC muscle testing to demo improvement in tolerance to activity  The pt to tolerate lifting 50# from the ground to waist height per job requirement description   The pt will report full participation in ADLs and IADLs without restrictions related to L Shoulder.      PLAN     Follow TSA procedure     Dorothy Basurto, PT, DPT
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
## 2023-10-25 ENCOUNTER — CLINICAL SUPPORT (OUTPATIENT)
Dept: REHABILITATION | Facility: HOSPITAL | Age: 43
End: 2023-10-25
Payer: COMMERCIAL

## 2023-10-25 DIAGNOSIS — K50.819 CROHN'S DISEASE OF BOTH SMALL AND LARGE INTESTINE WITH COMPLICATION: ICD-10-CM

## 2023-10-25 DIAGNOSIS — Z79.899 ENCOUNTER FOR LONG-TERM (CURRENT) USE OF HIGH-RISK MEDICATION: ICD-10-CM

## 2023-10-25 DIAGNOSIS — M25.612 DECREASED RANGE OF MOTION OF LEFT SHOULDER: Primary | ICD-10-CM

## 2023-10-25 PROCEDURE — 97140 MANUAL THERAPY 1/> REGIONS: CPT | Mod: CQ

## 2023-10-25 PROCEDURE — 97110 THERAPEUTIC EXERCISES: CPT | Mod: CQ

## 2023-10-25 PROCEDURE — 97112 NEUROMUSCULAR REEDUCATION: CPT | Mod: CQ

## 2023-10-25 RX ORDER — HYDROCODONE BITARTRATE AND ACETAMINOPHEN 10; 325 MG/1; MG/1
1 TABLET ORAL
Qty: 7 TABLET | Refills: 0 | Status: SHIPPED | OUTPATIENT
Start: 2023-10-25 | End: 2023-11-06 | Stop reason: SDUPTHER

## 2023-10-25 NOTE — PROGRESS NOTES
[facility]   Physical Therapy Treatment Note     Name: Ivy Salgado  Clinic Number: [id number]    Therapy Diagnosis:   Encounter Diagnosis   Name Primary?    Decreased range of motion of left shoulder Yes     Physician: Luis Madden DO    Visit Date: 10/25/2023       Evaluation Date: 8/8/2023  Authorization Period Expiration: 7/6/2024  Plan of Care Expiration: 12/31/2023  Progress Note Due: 9/10/2023  Visit # / Visits authorized: 23/ 40  Foto: 1/1      Time In: 1600  Time Out: 1500  Total Billable Time: 60 minutes  DOS: 7/18/2023  S/p: left  1. Total shoulder arthroplasty (complex, -22 modifier)  2. Shoulder lysis of adhesions  3. Shoulder subpectoral biceps tenodesis (CPT 68430)  Post-Op Precautions: We will follow the shoulder arthroplasty guidelines. The patient remained in a sling for 6 weeks. We will start PT at the 3-week martina.         Precautions: none    SUBJECTIVE     Pt reports:Do not have the help I am supposed to have at work. Shoulder hurts after working. Starting to feel it in my elbow. Seeing about being put back at  until shoulder gets stronger.     She was compliant with home exercise program.  Response to previous treatment: improvement in pain at rest   Functional change: able to josefa being out of sling for a little bit     Pain: 3/10  Location: left shoulder      OBJECTIVE     Shoulder Flexion AROM: 100 ; ER AROM to 20  Shoulder PROM Flexion 155 after manual    Treatment       Ivy received the treatments listed below:      Therapeutic exercises to develop ROM for 15 minutes including:  UBE 5/5 lvl 4  Slot Machine 40x -np  Pulleys 5m     Manual therapy techniques: Joint mobilizations and Soft tissue Mobilization were applied to the: L shoulder for 15 minutes, including:  Skilled PROM of L shoulder within tolerance and protocol in flexion/ER/IR, elbow flex/ext, wrist sup/pronation  Grade II mobs GHJ   METs for post/ant cuff activation   Rhythmic  "stab FL/ext and IR/ER    Neuromuscular re-education activities to improve: motor control for 30 min  Resisted ER walkouts YTB 2x15  Resisted IR otb 2x15   Wand flexion slight elevation 20x 3# (pain fee range)  Wand flexion supine 3# 30x (pain free range)  ABCs on the wall 3x NPT  Shoulder extensions YTB 4x8 5" hold       Patient Education and Home Exercises     Home Exercises Provided and Patient Education Provided     Education provided:   - Add new exercises     Written Home Exercises Provided: yes. Exercises were reviewed and Ivy was able to demonstrate them prior to the end of the session.  Ivy demonstrated good  understanding of the education provided. See EMR under Patient Instructions for exercises provided during therapy sessions    ASSESSMENT   Pt seems to be starting with some neural tension. Elbow pain with er at 90 degrees abduction as well as radicular symptoms into 4th and 5th digit with end range shoulder FL. Felt discomfort in elbow with ER walkouts.     Ivy Is progressing well towards her goals.     Pt prognosis is Fair.     Pt will continue to benefit from skilled outpatient physical therapy to address the deficits listed in the problem list box on initial evaluation, provide pt/family education and to maximize pt's level of independence in the home and community environment.     Pt's spiritual, cultural and educational needs considered and pt agreeable to plan of care and goals.     Anticipated barriers to physical therapy: none     Goals:   Short Term Goals: 12 weeks  1. Pt will be compliant with HEP 50% of prescribed amount.   2. The pt to demo improvement in R shoulder PROM to by 80% of the L  3.  The pt to demo tolerance to being out of the sling for 24 hours with pain <2/10     Long Term Goals:  36 weeks   Pt will be compliant with % of prescribed amount.   The pt to improvement in AROM of L shoulder within 80% of R shoulder to improve tolerance to OH movement   The pt to  " Demo at least 4+/5 of RTC muscle testing to demo improvement in tolerance to activity  The pt to tolerate lifting 50# from the ground to waist height per job requirement description   The pt will report full participation in ADLs and IADLs without restrictions related to L Shoulder.      PLAN     Follow TSA procedure     Vidya Lemus PTA,

## 2023-10-26 RX ORDER — DRONABINOL 5 MG/1
5 CAPSULE ORAL
Qty: 60 CAPSULE | Refills: 1 | Status: SHIPPED | OUTPATIENT
Start: 2023-10-26

## 2023-10-29 ENCOUNTER — HOSPITAL ENCOUNTER (OUTPATIENT)
Facility: HOSPITAL | Age: 43
Discharge: HOME | End: 2023-10-31
Attending: STUDENT IN AN ORGANIZED HEALTH CARE EDUCATION/TRAINING PROGRAM | Admitting: STUDENT IN AN ORGANIZED HEALTH CARE EDUCATION/TRAINING PROGRAM
Payer: COMMERCIAL

## 2023-10-29 ENCOUNTER — PATIENT MESSAGE (OUTPATIENT)
Dept: REHABILITATION | Facility: HOSPITAL | Age: 43
End: 2023-10-29
Payer: COMMERCIAL

## 2023-10-29 ENCOUNTER — PATIENT MESSAGE (OUTPATIENT)
Dept: ELECTROPHYSIOLOGY | Facility: CLINIC | Age: 43
End: 2023-10-29
Payer: COMMERCIAL

## 2023-10-29 ENCOUNTER — PATIENT MESSAGE (OUTPATIENT)
Dept: PSYCHIATRY | Facility: CLINIC | Age: 43
End: 2023-10-29
Payer: COMMERCIAL

## 2023-10-29 DIAGNOSIS — R00.2 PALPITATIONS: ICD-10-CM

## 2023-10-29 DIAGNOSIS — E83.51 HYPOCALCEMIA: Primary | ICD-10-CM

## 2023-10-29 DIAGNOSIS — E83.42 HYPOMAGNESEMIA: ICD-10-CM

## 2023-10-29 DIAGNOSIS — F41.1 GENERALIZED ANXIETY DISORDER: ICD-10-CM

## 2023-10-29 LAB
ALBUMIN SERPL BCP-MCNC: 2.1 G/DL (ref 3.5–5.2)
ALP SERPL-CCNC: 49 U/L (ref 55–135)
ALT SERPL W/O P-5'-P-CCNC: 8 U/L (ref 10–44)
ANION GAP SERPL CALC-SCNC: 5 MMOL/L (ref 8–16)
AST SERPL-CCNC: 22 U/L (ref 10–40)
BACTERIA #/AREA URNS AUTO: ABNORMAL /HPF
BASOPHILS # BLD AUTO: 0.04 K/UL (ref 0–0.2)
BASOPHILS NFR BLD: 0.6 % (ref 0–1.9)
BILIRUB SERPL-MCNC: 0.2 MG/DL (ref 0.1–1)
BILIRUB UR QL STRIP: NEGATIVE
BUN SERPL-MCNC: 9 MG/DL (ref 6–20)
CALCIUM SERPL-MCNC: 5.5 MG/DL (ref 8.7–10.5)
CAOX CRY UR QL COMP ASSIST: ABNORMAL
CHLORIDE SERPL-SCNC: 122 MMOL/L (ref 95–110)
CLARITY UR REFRACT.AUTO: CLEAR
CO2 SERPL-SCNC: 13 MMOL/L (ref 23–29)
COLOR UR AUTO: YELLOW
CREAT SERPL-MCNC: 0.5 MG/DL (ref 0.5–1.4)
D DIMER PPP IA.FEU-MCNC: 0.43 MG/L FEU
DIFFERENTIAL METHOD: NORMAL
EOSINOPHIL # BLD AUTO: 0.1 K/UL (ref 0–0.5)
EOSINOPHIL NFR BLD: 1.5 % (ref 0–8)
ERYTHROCYTE [DISTWIDTH] IN BLOOD BY AUTOMATED COUNT: 13.4 % (ref 11.5–14.5)
EST. GFR  (NO RACE VARIABLE): >60 ML/MIN/1.73 M^2
GLUCOSE SERPL-MCNC: 62 MG/DL (ref 70–110)
GLUCOSE UR QL STRIP: NEGATIVE
HCT VFR BLD AUTO: 43.1 % (ref 37–48.5)
HGB BLD-MCNC: 14.6 G/DL (ref 12–16)
HGB UR QL STRIP: NEGATIVE
IMM GRANULOCYTES # BLD AUTO: 0.01 K/UL (ref 0–0.04)
IMM GRANULOCYTES NFR BLD AUTO: 0.1 % (ref 0–0.5)
KETONES UR QL STRIP: NEGATIVE
LEUKOCYTE ESTERASE UR QL STRIP: ABNORMAL
LYMPHOCYTES # BLD AUTO: 2.6 K/UL (ref 1–4.8)
LYMPHOCYTES NFR BLD: 37.8 % (ref 18–48)
MAGNESIUM SERPL-MCNC: 1.2 MG/DL (ref 1.6–2.6)
MCH RBC QN AUTO: 29.3 PG (ref 27–31)
MCHC RBC AUTO-ENTMCNC: 33.9 G/DL (ref 32–36)
MCV RBC AUTO: 87 FL (ref 82–98)
MICROSCOPIC COMMENT: ABNORMAL
MONOCYTES # BLD AUTO: 0.8 K/UL (ref 0.3–1)
MONOCYTES NFR BLD: 11.8 % (ref 4–15)
NEUTROPHILS # BLD AUTO: 3.3 K/UL (ref 1.8–7.7)
NEUTROPHILS NFR BLD: 48.2 % (ref 38–73)
NITRITE UR QL STRIP: NEGATIVE
NRBC BLD-RTO: 0 /100 WBC
PH UR STRIP: 6 [PH] (ref 5–8)
PLATELET # BLD AUTO: 326 K/UL (ref 150–450)
PMV BLD AUTO: 9.4 FL (ref 9.2–12.9)
POTASSIUM SERPL-SCNC: 3.5 MMOL/L (ref 3.5–5.1)
PROT SERPL-MCNC: 4.6 G/DL (ref 6–8.4)
PROT UR QL STRIP: NEGATIVE
RBC # BLD AUTO: 4.98 M/UL (ref 4–5.4)
RBC #/AREA URNS AUTO: 1 /HPF (ref 0–4)
SODIUM SERPL-SCNC: 140 MMOL/L (ref 136–145)
SP GR UR STRIP: 1.02 (ref 1–1.03)
SQUAMOUS #/AREA URNS AUTO: 4 /HPF
TROPONIN I SERPL DL<=0.01 NG/ML-MCNC: <0.006 NG/ML (ref 0–0.03)
TSH SERPL DL<=0.005 MIU/L-ACNC: 1.64 UIU/ML (ref 0.4–4)
URN SPEC COLLECT METH UR: ABNORMAL
WBC # BLD AUTO: 6.78 K/UL (ref 3.9–12.7)
WBC #/AREA URNS AUTO: 9 /HPF (ref 0–5)

## 2023-10-29 PROCEDURE — 63600175 PHARM REV CODE 636 W HCPCS: Performed by: PHYSICIAN ASSISTANT

## 2023-10-29 PROCEDURE — 83735 ASSAY OF MAGNESIUM: CPT | Performed by: STUDENT IN AN ORGANIZED HEALTH CARE EDUCATION/TRAINING PROGRAM

## 2023-10-29 PROCEDURE — 80047 BASIC METABLC PNL IONIZED CA: CPT

## 2023-10-29 PROCEDURE — 93010 ELECTROCARDIOGRAM REPORT: CPT | Mod: ,,, | Performed by: INTERNAL MEDICINE

## 2023-10-29 PROCEDURE — 85379 FIBRIN DEGRADATION QUANT: CPT | Performed by: PHYSICIAN ASSISTANT

## 2023-10-29 PROCEDURE — 96360 HYDRATION IV INFUSION INIT: CPT

## 2023-10-29 PROCEDURE — 84484 ASSAY OF TROPONIN QUANT: CPT | Performed by: PHYSICIAN ASSISTANT

## 2023-10-29 PROCEDURE — 99285 EMERGENCY DEPT VISIT HI MDM: CPT | Mod: 25

## 2023-10-29 PROCEDURE — 93005 ELECTROCARDIOGRAM TRACING: CPT

## 2023-10-29 PROCEDURE — 93010 EKG 12-LEAD: ICD-10-PCS | Mod: ,,, | Performed by: INTERNAL MEDICINE

## 2023-10-29 PROCEDURE — 84443 ASSAY THYROID STIM HORMONE: CPT | Performed by: PHYSICIAN ASSISTANT

## 2023-10-29 PROCEDURE — 80053 COMPREHEN METABOLIC PANEL: CPT | Performed by: STUDENT IN AN ORGANIZED HEALTH CARE EDUCATION/TRAINING PROGRAM

## 2023-10-29 PROCEDURE — 85025 COMPLETE CBC W/AUTO DIFF WBC: CPT | Performed by: PHYSICIAN ASSISTANT

## 2023-10-29 PROCEDURE — 81001 URINALYSIS AUTO W/SCOPE: CPT | Performed by: PHYSICIAN ASSISTANT

## 2023-10-29 RX ORDER — MAGNESIUM SULFATE HEPTAHYDRATE 40 MG/ML
2 INJECTION, SOLUTION INTRAVENOUS ONCE
Status: DISCONTINUED | OUTPATIENT
Start: 2023-10-30 | End: 2023-10-31 | Stop reason: HOSPADM

## 2023-10-29 RX ADMIN — SODIUM CHLORIDE, POTASSIUM CHLORIDE, SODIUM LACTATE AND CALCIUM CHLORIDE 1000 ML: 600; 310; 30; 20 INJECTION, SOLUTION INTRAVENOUS at 11:10

## 2023-10-30 PROBLEM — E83.51 HYPOCALCEMIA: Status: ACTIVE | Noted: 2023-10-30

## 2023-10-30 LAB
ALBUMIN SERPL BCP-MCNC: 3.4 G/DL (ref 3.5–5.2)
ALBUMIN SERPL BCP-MCNC: 3.5 G/DL (ref 3.5–5.2)
ALP SERPL-CCNC: 79 U/L (ref 55–135)
ALP SERPL-CCNC: 88 U/L (ref 55–135)
ALT SERPL W/O P-5'-P-CCNC: 13 U/L (ref 10–44)
ALT SERPL W/O P-5'-P-CCNC: 15 U/L (ref 10–44)
ANION GAP SERPL CALC-SCNC: 12 MMOL/L (ref 8–16)
ANION GAP SERPL CALC-SCNC: 14 MMOL/L (ref 8–16)
ANISOCYTOSIS BLD QL SMEAR: SLIGHT
AST SERPL-CCNC: 17 U/L (ref 10–40)
AST SERPL-CCNC: 21 U/L (ref 10–40)
BASOPHILS # BLD AUTO: 0.03 K/UL (ref 0–0.2)
BASOPHILS NFR BLD: 0.5 % (ref 0–1.9)
BILIRUB SERPL-MCNC: 0.3 MG/DL (ref 0.1–1)
BILIRUB SERPL-MCNC: 0.4 MG/DL (ref 0.1–1)
BUN SERPL-MCNC: 11 MG/DL (ref 6–20)
BUN SERPL-MCNC: 5 MG/DL (ref 6–30)
BUN SERPL-MCNC: 9 MG/DL (ref 6–20)
CALCIUM SERPL-MCNC: 9 MG/DL (ref 8.7–10.5)
CALCIUM SERPL-MCNC: 9.2 MG/DL (ref 8.7–10.5)
CHLORIDE SERPL-SCNC: 105 MMOL/L (ref 95–110)
CHLORIDE SERPL-SCNC: 108 MMOL/L (ref 95–110)
CHLORIDE SERPL-SCNC: 111 MMOL/L (ref 95–110)
CO2 SERPL-SCNC: 17 MMOL/L (ref 23–29)
CO2 SERPL-SCNC: 20 MMOL/L (ref 23–29)
CREAT SERPL-MCNC: 0.7 MG/DL (ref 0.5–1.4)
CREAT SERPL-MCNC: 0.7 MG/DL (ref 0.5–1.4)
CREAT SERPL-MCNC: <0.2 MG/DL (ref 0.5–1.4)
DIFFERENTIAL METHOD: NORMAL
EOSINOPHIL # BLD AUTO: 0.1 K/UL (ref 0–0.5)
EOSINOPHIL NFR BLD: 1 % (ref 0–8)
ERYTHROCYTE [DISTWIDTH] IN BLOOD BY AUTOMATED COUNT: 13.5 % (ref 11.5–14.5)
EST. GFR  (NO RACE VARIABLE): >60 ML/MIN/1.73 M^2
EST. GFR  (NO RACE VARIABLE): >60 ML/MIN/1.73 M^2
GLUCOSE SERPL-MCNC: 46 MG/DL (ref 70–110)
GLUCOSE SERPL-MCNC: 92 MG/DL (ref 70–110)
GLUCOSE SERPL-MCNC: 92 MG/DL (ref 70–110)
HCT VFR BLD AUTO: 37 % (ref 37–48.5)
HCT VFR BLD CALC: 18 %PCV (ref 36–54)
HGB BLD-MCNC: 12.2 G/DL (ref 12–16)
HYPOCHROMIA BLD QL SMEAR: NORMAL
IMM GRANULOCYTES # BLD AUTO: 0.01 K/UL (ref 0–0.04)
IMM GRANULOCYTES NFR BLD AUTO: 0.2 % (ref 0–0.5)
LYMPHOCYTES # BLD AUTO: 2.7 K/UL (ref 1–4.8)
LYMPHOCYTES NFR BLD: 46.2 % (ref 18–48)
MAGNESIUM SERPL-MCNC: 1.8 MG/DL (ref 1.6–2.6)
MCH RBC QN AUTO: 28.8 PG (ref 27–31)
MCHC RBC AUTO-ENTMCNC: 33 G/DL (ref 32–36)
MCV RBC AUTO: 88 FL (ref 82–98)
MONOCYTES # BLD AUTO: 0.6 K/UL (ref 0.3–1)
MONOCYTES NFR BLD: 10.8 % (ref 4–15)
NEUTROPHILS # BLD AUTO: 2.4 K/UL (ref 1.8–7.7)
NEUTROPHILS NFR BLD: 41.3 % (ref 38–73)
NRBC BLD-RTO: 0 /100 WBC
OVALOCYTES BLD QL SMEAR: NORMAL
PLATELET # BLD AUTO: 258 K/UL (ref 150–450)
PMV BLD AUTO: 9.7 FL (ref 9.2–12.9)
POC IONIZED CALCIUM: 0.89 MMOL/L (ref 1.06–1.42)
POC TCO2 (MEASURED): 13 MMOL/L (ref 23–29)
POIKILOCYTOSIS BLD QL SMEAR: SLIGHT
POLYCHROMASIA BLD QL SMEAR: NORMAL
POTASSIUM BLD-SCNC: 2.9 MMOL/L (ref 3.5–5.1)
POTASSIUM SERPL-SCNC: 3.5 MMOL/L (ref 3.5–5.1)
POTASSIUM SERPL-SCNC: 3.7 MMOL/L (ref 3.5–5.1)
PROT SERPL-MCNC: 6.8 G/DL (ref 6–8.4)
PROT SERPL-MCNC: 7.3 G/DL (ref 6–8.4)
RBC # BLD AUTO: 4.23 M/UL (ref 4–5.4)
SAMPLE: ABNORMAL
SODIUM BLD-SCNC: 143 MMOL/L (ref 136–145)
SODIUM SERPL-SCNC: 136 MMOL/L (ref 136–145)
SODIUM SERPL-SCNC: 140 MMOL/L (ref 136–145)
SPHEROCYTES BLD QL SMEAR: NORMAL
WBC # BLD AUTO: 5.74 K/UL (ref 3.9–12.7)

## 2023-10-30 PROCEDURE — 25000003 PHARM REV CODE 250: Performed by: HOSPITALIST

## 2023-10-30 PROCEDURE — 25000003 PHARM REV CODE 250: Performed by: STUDENT IN AN ORGANIZED HEALTH CARE EDUCATION/TRAINING PROGRAM

## 2023-10-30 PROCEDURE — 25000003 PHARM REV CODE 250: Performed by: NURSE PRACTITIONER

## 2023-10-30 PROCEDURE — 63600175 PHARM REV CODE 636 W HCPCS: Performed by: HOSPITALIST

## 2023-10-30 PROCEDURE — G0378 HOSPITAL OBSERVATION PER HR: HCPCS

## 2023-10-30 PROCEDURE — 93010 ELECTROCARDIOGRAM REPORT: CPT | Mod: ,,, | Performed by: INTERNAL MEDICINE

## 2023-10-30 PROCEDURE — 85025 COMPLETE CBC W/AUTO DIFF WBC: CPT | Performed by: HOSPITALIST

## 2023-10-30 PROCEDURE — 99213 PR OFFICE/OUTPT VISIT, EST, LEVL III, 20-29 MIN: ICD-10-PCS | Mod: ,,, | Performed by: INTERNAL MEDICINE

## 2023-10-30 PROCEDURE — 93005 ELECTROCARDIOGRAM TRACING: CPT

## 2023-10-30 PROCEDURE — 93010 EKG 12-LEAD: ICD-10-PCS | Mod: ,,, | Performed by: INTERNAL MEDICINE

## 2023-10-30 PROCEDURE — 80053 COMPREHEN METABOLIC PANEL: CPT | Performed by: HOSPITALIST

## 2023-10-30 PROCEDURE — 99213 OFFICE O/P EST LOW 20 MIN: CPT | Mod: ,,, | Performed by: INTERNAL MEDICINE

## 2023-10-30 RX ORDER — GABAPENTIN 300 MG/1
300 CAPSULE ORAL 2 TIMES DAILY
Status: DISCONTINUED | OUTPATIENT
Start: 2023-10-30 | End: 2023-10-31 | Stop reason: HOSPADM

## 2023-10-30 RX ORDER — MIRTAZAPINE 15 MG/1
30 TABLET, ORALLY DISINTEGRATING ORAL NIGHTLY
Status: CANCELLED | OUTPATIENT
Start: 2023-10-30

## 2023-10-30 RX ORDER — LOPERAMIDE HYDROCHLORIDE 2 MG/1
2 CAPSULE ORAL 3 TIMES DAILY
Status: DISCONTINUED | OUTPATIENT
Start: 2023-10-30 | End: 2023-10-30

## 2023-10-30 RX ORDER — ASPIRIN 81 MG/1
81 TABLET ORAL DAILY
Status: DISCONTINUED | OUTPATIENT
Start: 2023-10-30 | End: 2023-10-31 | Stop reason: HOSPADM

## 2023-10-30 RX ORDER — DRONABINOL 2.5 MG/1
5 CAPSULE ORAL
Status: DISCONTINUED | OUTPATIENT
Start: 2023-10-30 | End: 2023-10-31 | Stop reason: HOSPADM

## 2023-10-30 RX ORDER — NADOLOL 20 MG/1
40 TABLET ORAL DAILY
Status: DISCONTINUED | OUTPATIENT
Start: 2023-10-31 | End: 2023-10-30

## 2023-10-30 RX ORDER — PANTOPRAZOLE SODIUM 40 MG/1
40 TABLET, DELAYED RELEASE ORAL 2 TIMES DAILY
Status: DISCONTINUED | OUTPATIENT
Start: 2023-10-30 | End: 2023-10-31 | Stop reason: HOSPADM

## 2023-10-30 RX ORDER — MIRTAZAPINE 15 MG/1
30 TABLET, FILM COATED ORAL NIGHTLY
Status: DISCONTINUED | OUTPATIENT
Start: 2023-10-30 | End: 2023-10-31 | Stop reason: HOSPADM

## 2023-10-30 RX ORDER — SODIUM CHLORIDE 9 MG/ML
INJECTION, SOLUTION INTRAVENOUS CONTINUOUS
Status: ACTIVE | OUTPATIENT
Start: 2023-10-30 | End: 2023-10-31

## 2023-10-30 RX ORDER — NADOLOL 20 MG/1
40 TABLET ORAL DAILY
Status: DISCONTINUED | OUTPATIENT
Start: 2023-10-31 | End: 2023-10-31 | Stop reason: HOSPADM

## 2023-10-30 RX ORDER — LOPERAMIDE HYDROCHLORIDE 2 MG/1
2 CAPSULE ORAL DAILY PRN
Status: DISCONTINUED | OUTPATIENT
Start: 2023-10-30 | End: 2023-10-30

## 2023-10-30 RX ORDER — SODIUM CHLORIDE 0.9 % (FLUSH) 0.9 %
10 SYRINGE (ML) INJECTION
Status: DISCONTINUED | OUTPATIENT
Start: 2023-10-30 | End: 2023-10-31 | Stop reason: HOSPADM

## 2023-10-30 RX ORDER — TALC
6 POWDER (GRAM) TOPICAL NIGHTLY PRN
Status: DISCONTINUED | OUTPATIENT
Start: 2023-10-30 | End: 2023-10-31 | Stop reason: HOSPADM

## 2023-10-30 RX ORDER — LOPERAMIDE HYDROCHLORIDE 2 MG/1
2 CAPSULE ORAL 4 TIMES DAILY
Status: DISCONTINUED | OUTPATIENT
Start: 2023-10-30 | End: 2023-10-30

## 2023-10-30 RX ORDER — SODIUM CHLORIDE 9 MG/ML
INJECTION, SOLUTION INTRAVENOUS CONTINUOUS
Status: ACTIVE | OUTPATIENT
Start: 2023-10-30 | End: 2023-10-30

## 2023-10-30 RX ORDER — MAG HYDROX/ALUMINUM HYD/SIMETH 200-200-20
30 SUSPENSION, ORAL (FINAL DOSE FORM) ORAL
Status: DISCONTINUED | OUTPATIENT
Start: 2023-10-30 | End: 2023-10-31 | Stop reason: HOSPADM

## 2023-10-30 RX ORDER — CLONAZEPAM 0.5 MG/1
1 TABLET ORAL ONCE
Status: COMPLETED | OUTPATIENT
Start: 2023-10-30 | End: 2023-10-30

## 2023-10-30 RX ORDER — CLONAZEPAM 0.5 MG/1
1 TABLET ORAL 2 TIMES DAILY
Status: DISCONTINUED | OUTPATIENT
Start: 2023-10-30 | End: 2023-10-31 | Stop reason: HOSPADM

## 2023-10-30 RX ORDER — TAMSULOSIN HYDROCHLORIDE 0.4 MG/1
0.4 CAPSULE ORAL DAILY
Status: DISCONTINUED | OUTPATIENT
Start: 2023-10-30 | End: 2023-10-31 | Stop reason: HOSPADM

## 2023-10-30 RX ORDER — SUCRALFATE 1 G/10ML
1 SUSPENSION ORAL EVERY 6 HOURS
Status: DISCONTINUED | OUTPATIENT
Start: 2023-10-30 | End: 2023-10-31 | Stop reason: HOSPADM

## 2023-10-30 RX ADMIN — CLONAZEPAM 1 MG: 0.5 TABLET ORAL at 03:10

## 2023-10-30 RX ADMIN — PANTOPRAZOLE SODIUM 40 MG: 40 TABLET, DELAYED RELEASE ORAL at 08:10

## 2023-10-30 RX ADMIN — ASPIRIN 81 MG: 81 TABLET, COATED ORAL at 08:10

## 2023-10-30 RX ADMIN — TAMSULOSIN HYDROCHLORIDE 0.4 MG: 0.4 CAPSULE ORAL at 08:10

## 2023-10-30 RX ADMIN — ALUMINUM HYDROXIDE, MAGNESIUM HYDROXIDE, AND SIMETHICONE 30 ML: 200; 200; 20 SUSPENSION ORAL at 04:10

## 2023-10-30 RX ADMIN — GABAPENTIN 300 MG: 300 CAPSULE ORAL at 08:10

## 2023-10-30 RX ADMIN — DRONABINOL 5 MG: 2.5 CAPSULE ORAL at 04:10

## 2023-10-30 RX ADMIN — ALUMINUM HYDROXIDE, MAGNESIUM HYDROXIDE, AND SIMETHICONE 30 ML: 200; 200; 20 SUSPENSION ORAL at 08:10

## 2023-10-30 RX ADMIN — CLONAZEPAM 1 MG: 0.5 TABLET ORAL at 08:10

## 2023-10-30 RX ADMIN — SODIUM CHLORIDE: 9 INJECTION, SOLUTION INTRAVENOUS at 09:10

## 2023-10-30 RX ADMIN — LOPERAMIDE HYDROCHLORIDE 2 MG: 2 CAPSULE ORAL at 10:10

## 2023-10-30 RX ADMIN — DRONABINOL 5 MG: 2.5 CAPSULE ORAL at 05:10

## 2023-10-30 RX ADMIN — SUCRALFATE 1 G: 1 SUSPENSION ORAL at 05:10

## 2023-10-30 RX ADMIN — MIRTAZAPINE 30 MG: 15 TABLET, FILM COATED ORAL at 08:10

## 2023-10-30 RX ADMIN — SUCRALFATE 1 G: 1 SUSPENSION ORAL at 12:10

## 2023-10-30 RX ADMIN — SODIUM CHLORIDE: 9 INJECTION, SOLUTION INTRAVENOUS at 02:10

## 2023-10-30 RX ADMIN — ALUMINUM HYDROXIDE, MAGNESIUM HYDROXIDE, AND SIMETHICONE 30 ML: 200; 200; 20 SUSPENSION ORAL at 05:10

## 2023-10-30 RX ADMIN — ALUMINUM HYDROXIDE, MAGNESIUM HYDROXIDE, AND SIMETHICONE 30 ML: 200; 200; 20 SUSPENSION ORAL at 10:10

## 2023-10-30 NOTE — SUBJECTIVE & OBJECTIVE
Past Medical History:   Diagnosis Date    Abnormal Pap smear 2007    Abnormal Pap smear 5/26/2011    Anemia     Anxiety     Arthritis     C. difficile diarrhea     Crohn's disease     Depression 8/5/2017    Encounter for blood transfusion     Genital HSV     History of colposcopy with cervical biopsy 2007 and 7/2011 2007-LYLA I  and 7/2011- LYLA I    Hypertension     Kidney stone     Kidney stone     Melanoma     Recurrent UTI 4/3/2013    S/P ileostomy 7/9/2012    Sterilization 6/23/2012       Past Surgical History:   Procedure Laterality Date    ABDOMINAL SURGERY      APPENDECTOMY      ARTHROPLASTY OF SHOULDER Left 7/18/2023    Procedure: ARTHROPLASTY, SHOULDER;  Surgeon: Sharon Babb MD;  Location: [facility];  Service: Orthopedics;  Laterality: Left;    AUGMENTATION OF BREAST Bilateral 06/2022    gel implants    BILATERAL SALPINGO-OOPHORECTOMY (BSO) Bilateral 05/30/2019    Procedure: SALPINGO-OOPHORECTOMY, BILATERAL;  Surgeon: Rupa German MD;  Location: [facility] OR [address] [place] [address];  Service: OB/GYN;  Laterality: Bilateral;    BLADDER SURGERY      partial cystectomy due to fistula    breast lift      BREAST SURGERY      [facility]      COLON SURGERY      COLONOSCOPY      CYSTOSCOPY  09/23/2020    Procedure: CYSTOSCOPY;  Surgeon: Sascha Florentino MD;  Location: [facility] OR [address] [place] [address];  Service: Urology;;    CYSTOSCOPY W/ URETERAL STENT PLACEMENT Left 09/15/2020    Procedure: CYSTOSCOPY, WITH URETERAL STENT INSERTION;  Surgeon: Sascha Florentino MD;  Location: [facility] OR [address] [place] [address];  Service: Urology;  Laterality: Left;    DIAGNOSTIC LAPAROSCOPY N/A 07/09/2020    Procedure: LAPAROSCOPY, DIAGNOSTIC;  Surgeon: Gilson El MD;  Location: [facility] OR;  Service: OB/GYN;  Laterality: N/A;    EXCISION OF MELANOMA  07/17/2019    ILEOSTOMY      LAPAROSCOPIC LYSIS OF ADHESIONS N/A 07/09/2020    Procedure: LYSIS, ADHESIONS, LAPAROSCOPIC;  Surgeon: Gilson El MD;  Location: [facility] OR;  Service: OB/GYN;  Laterality: N/A;    LASER LITHOTRIPSY   09/23/2020    Procedure: LITHOTRIPSY, USING LASER;  Surgeon: Sascha Florentino MD;  Location: [facility] OR 1ST FLR;  Service: Urology;;    LYSIS OF ADHESIONS N/A 05/30/2019    Procedure: LYSIS, ADHESIONS;  Surgeon: Rupa German MD;  Location: [facility] OR 2ND FLR;  Service: OB/GYN;  Laterality: N/A;    OOPHORECTOMY Right 04/16/2015    PORTACATH PLACEMENT  02/21/2017    SKIN BIOPSY      SMALL INTESTINE SURGERY      age 18 Y    TOTAL ABDOMINAL HYSTERECTOMY  04/16/2015    TOTAL COLECTOMY      TUBAL LIGATION  06/06/2012    UPPER GASTROINTESTINAL ENDOSCOPY      URETEROSCOPIC REMOVAL OF URETERIC CALCULUS  09/23/2020    Procedure: REMOVAL, CALCULUS, URETER, URETEROSCOPIC;  Surgeon: Sascha Florentino MD;  Location: [facility] OR 1ST FLR;  Service: Urology;;    URETEROSCOPY Left 09/23/2020    Procedure: URETEROSCOPY;  Surgeon: Sascha Florentino MD;  [facility];  Service: Urology;  Laterality: Left;       Review of patient's allergies indicates:   Allergen Reactions    Azathioprine sodium Other (See Comments)     Other reaction(s): pancreatitis  Other reaction(s): pancreatitis    Methotrexate analogues Other (See Comments)     leukopenia    Stelara [ustekinumab] Other (See Comments)     Multiple infections    Zofran [ondansetron hcl (pf)] Other (See Comments)     Per patient causes prolong QT    Vancomycin analogues Other (See Comments)     Made her red    Azathioprine      Other reaction(s): Unknown    Methotrexate      Other reaction(s): infection-    Morphine Itching and Other (See Comments)     Other reaction(s): Itching    Zofran [ondansetron hcl]      Other reaction(s): Hives    Bactrim [sulfamethoxazole-trimethoprim] Palpitations    Ciprofloxacin Palpitations       No current facility-administered medications on file prior to encounter.     Current Outpatient Medications on File Prior to Encounter   Medication Sig    aspirin (ECOTRIN) 81 MG EC tablet Take 1 tablet (81 mg total) by mouth once daily. For 6  weeks starting the day after surgery. (Patient not taking: Reported on 9/19/2023)    buPROPion (WELLBUTRIN SR) 100 MG TBSR 12 hr tablet Take 1 tablet (100 mg total) by mouth once daily.    clonazePAM (KLONOPIN) 1 MG tablet Take 1 tablet (1 mg total) by mouth 2 (two) times daily.    cyclobenzaprine (FLEXERIL) 10 MG tablet Take 1 tablet (10 mg total) by mouth every evening as needed for muscle pain    droNABinol (MARINOL) 5 MG capsule Take 1 capsule (5 mg total) by mouth 2 (two) times daily before meals.    estradioL (ESTRACE) 0.01 % (0.1 mg/gram) vaginal cream Place 1 g vaginally once daily.    flu vacc [id number] 6mos up,PF, (FLUARIX QUAD 8132-7868, PF,) 60 mcg (15 mcg x 4)/0.5 mL Syrg inject into muscle for one dose    fluconazole (DIFLUCAN) 150 MG Tab Take 1 tablet (150 mg total) by mouth every 72 hours as needed (vaginal yeast).    gabapentin (NEURONTIN) 300 MG capsule Take 1 capsule (300 mg total) by mouth 2 (two) times daily.    HYDROcodone-acetaminophen (NORCO)  mg per tablet Take 1 tablet by mouth every 24 hours as needed for Pain.    loperamide (IMODIUM) 2 mg capsule Take 2 mg by mouth daily as needed for Diarrhea.    mirtazapine (REMERON SOL-TAB) 30 MG disintegrating tablet Take 1 tablet (30 mg total) by mouth nightly.    multivitamin (THERAGRAN) per tablet Take 1 tablet by mouth once daily.    nadoloL (CORGARD) 40 MG tablet Take 1 tablet (40 mg total) by mouth once daily.    pantoprazole (PROTONIX) 40 MG tablet Take 1 tablet (40 mg total) by mouth 2 (two) times daily.    promethazine (PHENERGAN) 25 MG tablet TAKE 1 TABLET BY MOUTH EVERY 8 HOURS FOR NAUSEA    tamsulosin (FLOMAX) 0.4 mg Cap Take 1 capsule (0.4 mg total) by mouth once daily.    triamcinolone acetonide 0.1% (KENALOG) 0.1 % cream Apply topically 2 (two) times daily.    valACYclovir (VALTREX) 1000 MG tablet Take one tablet by mouth twice daily for 5 days    valACYclovir (VALTREX) 500 MG tablet Take 1 tablet (500 mg total) by mouth once  daily.    vedolizumab (ENTYVIO) 300 mg SolR injection Inject 300 mg into the vein.    zolpidem (AMBIEN) 10 mg Tab Take 1 tablet (10 mg total) by mouth every evening.     Family History       Problem Relation (Age of Onset)    Breast cancer Maternal Cousin (41)    Cancer Maternal Grandfather, Father    Colon cancer Father    Crohn's disease Brother, Daughter    Diabetes Paternal Grandfather, Maternal Grandfather    Endometrial cancer Maternal Aunt    Hearing loss Paternal Grandmother    Heart disease Maternal Grandfather    Hyperlipidemia Father    Hypertension Mother    Liver cancer Father    Skin cancer Maternal Grandfather          Tobacco Use    Smoking status: Never    Smokeless tobacco: Never   Substance and Sexual Activity    Alcohol use: Not Currently     Alcohol/week: 0.0 standard drinks of alcohol    Drug use: No    Sexual activity: Yes     Partners: Male     Birth control/protection: See Surgical Hx     Comment: HYST     Review of Systems   Constitutional: Negative for chills, fever and night sweats.   HENT:  Negative for congestion and hearing loss.    Eyes:  Negative for blurred vision, discharge, pain and visual disturbance.   Cardiovascular:  Positive for palpitations. Negative for chest pain, dyspnea on exertion, leg swelling and near-syncope.   Respiratory:  Negative for cough, hemoptysis, shortness of breath and wheezing.    Endocrine: Negative for cold intolerance and heat intolerance.   Skin:  Negative for flushing.   Musculoskeletal:  Negative for muscle weakness, myalgias, neck pain and stiffness.   Gastrointestinal:  Negative for abdominal pain, constipation, diarrhea, nausea and vomiting.   Genitourinary:  Negative for dysuria and hematuria.   Neurological:  Negative for focal weakness, headaches, numbness and paresthesias.   Psychiatric/Behavioral:  Negative for altered mental status and memory loss. The patient is not nervous/anxious.      Objective:     Vital Signs (Most Recent):  Temp:  97.2 °F (36.2 °C) (10/30/23 0911)  Pulse: 76 (10/30/23 1504)  Resp: 11 (10/30/23 0829)  BP: 100/64 (10/30/23 0829)  SpO2: 99 % (10/30/23 0829) Vital Signs (24h Range):  Temp:  [94.3 °F (34.6 °C)-98.5 °F (36.9 °C)] 97.2 °F (36.2 °C)  Pulse:  [] 76  Resp:  [11-18] 11  SpO2:  [97 %-99 %] 99 %  BP: (100-143)/(64-98) 100/64       Weight: 45.4 kg (100 lb 1.4 oz)  Body mass index is 18.91 kg/m².    SpO2: 99 %        Physical Exam  Constitutional:       General: She is not in acute distress.     Appearance: Normal appearance. She is not ill-appearing or diaphoretic.   HENT:      Head: Normocephalic and atraumatic.   Eyes:      General: No scleral icterus.        Right eye: No discharge.         Left eye: No discharge.      Extraocular Movements: Extraocular movements intact.      Conjunctiva/sclera: Conjunctivae normal.   Cardiovascular:      Rate and Rhythm: Normal rate and regular rhythm.      Pulses: Normal pulses.   Pulmonary:      Effort: Pulmonary effort is normal. No respiratory distress.   Musculoskeletal:         General: Normal range of motion.   Skin:     General: Skin is warm and dry.   Neurological:      General: No focal deficit present.      Mental Status: She is alert and oriented to person, place, and time.   Psychiatric:         Mood and Affect: Mood normal.         Behavior: Behavior normal.            Significant Labs: All pertinent lab results from the last 24 hours have been reviewed.

## 2023-10-30 NOTE — ASSESSMENT & PLAN NOTE
Patient is currently on nadolol for history of long QT and follows with EP as outpatient.  Patient was advised to take her home medication of nadolol as we do not carry it in the hospital

## 2023-10-30 NOTE — ASSESSMENT & PLAN NOTE
Likely related to intravascular volume depletion from GI fluid loss with contributing initiation of welbutrin  Continue nadolol and fluid resuscitation

## 2023-10-30 NOTE — ASSESSMENT & PLAN NOTE
On Wellbutrin and clonazepam as needed.  Will hold off on Wellbutrin and continue clonazepam for now.  Advised to follow up with Psychiatry.  EKG did not show any prolonged QTC

## 2023-10-30 NOTE — PLAN OF CARE
[facility] - Observation 11H  Discharge Assessment    Primary Care Provider: Luis Madden, DO     Discharge Assessment (most recent)       BRIEF DISCHARGE ASSESSMENT - 10/30/23 1101          Discharge Planning    Assessment Type Discharge Planning Brief Assessment     Resource/Environmental Concerns none     Support Systems Family members     Equipment Currently Used at Home none     Current Living Arrangements home     Patient/Family Anticipates Transition to home     Patient/Family Anticipated Services at Transition none     DME Needed Upon Discharge  none     Discharge Plan A Home                   Pt is a 43 y.o. female admitted with hypocalcemia and has a PMH of Crohn's s/p colostomy. She lives with her son in a single story house. She required no assistive devices, she is independent with her ADLs and iADLs and works full time. [facility] Discharge Packet given to patient and/or family with understanding verbalized.   name and number and estimated discharge date written on white board in patient's room with request to call for any questions or concerns.  Will continue to follow for needs.  Gavino Navarro RN,BSN

## 2023-10-30 NOTE — NURSING
Report received from MIKE Ramos. Assumed care. Pt resting comfortably in bed. NS infusing @ 75ml/hr site CDI. Bed locked and in lowest position, side rails up x2, call light in reach. communication board upated. Will continue with POC.

## 2023-10-30 NOTE — TELEPHONE ENCOUNTER
Called patient. She is actually in the hospital. Patient experienced palpitations and SOB yesterday evening. She went to the ER and got admitted last night. Patient states she is still in the hospital. I told her to let us know if she needs anything else. Patient verbalized understanding.

## 2023-10-30 NOTE — SUBJECTIVE & OBJECTIVE
Past Medical History:   Diagnosis Date    Abnormal Pap smear 2007    Abnormal Pap smear 5/26/2011    Anemia     Anxiety     Arthritis     C. difficile diarrhea     Crohn's disease     Depression 8/5/2017    Encounter for blood transfusion     Genital HSV     History of colposcopy with cervical biopsy 2007 and 7/2011 2007-LYLA I  and 7/2011- LYLA I    Hypertension     Kidney stone     Kidney stone     Melanoma     Recurrent UTI 4/3/2013    S/P ileostomy 7/9/2012    Sterilization 6/23/2012       Past Surgical History:   Procedure Laterality Date    ABDOMINAL SURGERY      APPENDECTOMY      ARTHROPLASTY OF SHOULDER Left 7/18/2023    Procedure: ARTHROPLASTY, SHOULDER;  Surgeon: Sharon Babb MD;  Location: [facility];  Service: Orthopedics;  Laterality: Left;    AUGMENTATION OF BREAST Bilateral 06/2022    gel implants    BILATERAL SALPINGO-OOPHORECTOMY (BSO) Bilateral 05/30/2019    Procedure: SALPINGO-OOPHORECTOMY, BILATERAL;  Surgeon: Rupa German MD;  Location: [facility] OR [address] [place] [address];  Service: OB/GYN;  Laterality: Bilateral;    BLADDER SURGERY      partial cystectomy due to fistula    breast lift      BREAST SURGERY      [facility]      COLON SURGERY      COLONOSCOPY      CYSTOSCOPY  09/23/2020    Procedure: CYSTOSCOPY;  Surgeon: Sascha Florentino MD;  Location: [facility] OR [address] [place] [address];  Service: Urology;;    CYSTOSCOPY W/ URETERAL STENT PLACEMENT Left 09/15/2020    Procedure: CYSTOSCOPY, WITH URETERAL STENT INSERTION;  Surgeon: Sascha Florentino MD;  Location: [facility] OR [address] [place] [address];  Service: Urology;  Laterality: Left;    DIAGNOSTIC LAPAROSCOPY N/A 07/09/2020    Procedure: LAPAROSCOPY, DIAGNOSTIC;  Surgeon: Gilson El MD;  Location: [facility] OR;  Service: OB/GYN;  Laterality: N/A;    EXCISION OF MELANOMA  07/17/2019    ILEOSTOMY      LAPAROSCOPIC LYSIS OF ADHESIONS N/A 07/09/2020    Procedure: LYSIS, ADHESIONS, LAPAROSCOPIC;  Surgeon: Gilson El MD;  Location: [facility] OR;  Service: OB/GYN;  Laterality: N/A;    LASER LITHOTRIPSY   09/23/2020    Procedure: LITHOTRIPSY, USING LASER;  Surgeon: Sascha Florentino MD;  Location: [facility] OR 1ST FLR;  Service: Urology;;    LYSIS OF ADHESIONS N/A 05/30/2019    Procedure: LYSIS, ADHESIONS;  Surgeon: Rupa German MD;  Location: [facility] OR 2ND FLR;  Service: OB/GYN;  Laterality: N/A;    OOPHORECTOMY Right 04/16/2015    PORTACATH PLACEMENT  02/21/2017    SKIN BIOPSY      SMALL INTESTINE SURGERY      age 18 Y    TOTAL ABDOMINAL HYSTERECTOMY  04/16/2015    TOTAL COLECTOMY      TUBAL LIGATION  06/06/2012    UPPER GASTROINTESTINAL ENDOSCOPY      URETEROSCOPIC REMOVAL OF URETERIC CALCULUS  09/23/2020    Procedure: REMOVAL, CALCULUS, URETER, URETEROSCOPIC;  Surgeon: Sascha Florentino MD;  Location: [facility] OR 1ST FLR;  Service: Urology;;    URETEROSCOPY Left 09/23/2020    Procedure: URETEROSCOPY;  Surgeon: Sascha Florentino MD;  [facility];  Service: Urology;  Laterality: Left;       Review of patient's allergies indicates:   Allergen Reactions    Azathioprine sodium Other (See Comments)     Other reaction(s): pancreatitis  Other reaction(s): pancreatitis    Methotrexate analogues Other (See Comments)     leukopenia    Stelara [ustekinumab] Other (See Comments)     Multiple infections    Zofran [ondansetron hcl (pf)] Other (See Comments)     Per patient causes prolong QT    Vancomycin analogues Other (See Comments)     Made her red    Azathioprine      Other reaction(s): Unknown    Methotrexate      Other reaction(s): infection-    Morphine Itching and Other (See Comments)     Other reaction(s): Itching    Zofran [ondansetron hcl]      Other reaction(s): Hives    Bactrim [sulfamethoxazole-trimethoprim] Palpitations    Ciprofloxacin Palpitations       Current Facility-Administered Medications on File Prior to Encounter   Medication    estradiol valerate (DELESTROGEN) injection 20 mg/mL    estradiol valerate injection 20 mg     Current Outpatient Medications on File Prior to Encounter    Medication Sig    aspirin (ECOTRIN) 81 MG EC tablet Take 1 tablet (81 mg total) by mouth once daily. For 6 weeks starting the day after surgery. (Patient not taking: Reported on 9/19/2023)    buPROPion (WELLBUTRIN SR) 100 MG TBSR 12 hr tablet Take 1 tablet (100 mg total) by mouth once daily.    clonazePAM (KLONOPIN) 1 MG tablet Take 1 tablet (1 mg total) by mouth 2 (two) times daily.    cyclobenzaprine (FLEXERIL) 10 MG tablet Take 1 tablet (10 mg total) by mouth every evening as needed for muscle pain    droNABinol (MARINOL) 5 MG capsule Take 1 capsule (5 mg total) by mouth 2 (two) times daily before meals.    estradioL (ESTRACE) 0.01 % (0.1 mg/gram) vaginal cream Place 1 g vaginally once daily.    flu vacc [id number] 6mos up,PF, (FLUARIX QUAD 5029-5283, PF,) 60 mcg (15 mcg x 4)/0.5 mL Syrg inject into muscle for one dose    fluconazole (DIFLUCAN) 150 MG Tab Take 1 tablet (150 mg total) by mouth every 72 hours as needed (vaginal yeast).    gabapentin (NEURONTIN) 300 MG capsule Take 1 capsule (300 mg total) by mouth 2 (two) times daily.    HYDROcodone-acetaminophen (NORCO)  mg per tablet Take 1 tablet by mouth every 24 hours as needed for Pain.    loperamide (IMODIUM) 2 mg capsule Take 2 mg by mouth daily as needed for Diarrhea.    mirtazapine (REMERON SOL-TAB) 30 MG disintegrating tablet Take 1 tablet (30 mg total) by mouth nightly.    multivitamin (THERAGRAN) per tablet Take 1 tablet by mouth once daily.    nadoloL (CORGARD) 40 MG tablet Take 1 tablet (40 mg total) by mouth once daily.    pantoprazole (PROTONIX) 40 MG tablet Take 1 tablet (40 mg total) by mouth 2 (two) times daily.    promethazine (PHENERGAN) 25 MG tablet TAKE 1 TABLET BY MOUTH EVERY 8 HOURS FOR NAUSEA    tamsulosin (FLOMAX) 0.4 mg Cap Take 1 capsule (0.4 mg total) by mouth once daily.    triamcinolone acetonide 0.1% (KENALOG) 0.1 % cream Apply topically 2 (two) times daily.    valACYclovir (VALTREX) 1000 MG tablet Take one tablet by mouth  twice daily for 5 days    valACYclovir (VALTREX) 500 MG tablet Take 1 tablet (500 mg total) by mouth once daily.    vedolizumab (ENTYVIO) 300 mg SolR injection Inject 300 mg into the vein.    zolpidem (AMBIEN) 10 mg Tab Take 1 tablet (10 mg total) by mouth every evening.     Family History       Problem Relation (Age of Onset)    Breast cancer Maternal Cousin (41)    Cancer Maternal Grandfather, Father    Colon cancer Father    Crohn's disease Brother, Daughter    Diabetes Paternal Grandfather, Maternal Grandfather    Endometrial cancer Maternal Aunt    Hearing loss Paternal Grandmother    Heart disease Maternal Grandfather    Hyperlipidemia Father    Hypertension Mother    Liver cancer Father    Skin cancer Maternal Grandfather          Tobacco Use    Smoking status: Never    Smokeless tobacco: Never   Substance and Sexual Activity    Alcohol use: Not Currently     Alcohol/week: 0.0 standard drinks of alcohol    Drug use: No    Sexual activity: Yes     Partners: Male     Birth control/protection: See Surgical Hx     Comment: HYST     Review of Systems   Constitutional: Negative.    HENT: Negative.     Eyes: Negative.    Respiratory:  Positive for shortness of breath.    Cardiovascular:  Positive for palpitations.   Gastrointestinal: Negative.    Endocrine: Negative.    Genitourinary: Negative.      Objective:     Vital Signs (Most Recent):  Temp: 98.5 °F (36.9 °C) (10/29/23 2317)  Pulse: (S) 84 (10/29/23 2347)  Resp: 16 (10/29/23 2317)  BP: (S) 126/81 (10/29/23 2347)  SpO2: 97 % (10/29/23 2317) Vital Signs (24h Range):  Temp:  [98.4 °F (36.9 °C)-98.5 °F (36.9 °C)] 98.5 °F (36.9 °C)  Pulse:  [] 84  Resp:  [15-16] 16  SpO2:  [97 %-98 %] 97 %  BP: (125-143)/(75-98) 126/81     Weight: 46.7 kg (103 lb)  Body mass index is 19.46 kg/m².     Physical Exam  Constitutional:       Appearance: Normal appearance.   HENT:      Head: Normocephalic and atraumatic.   Eyes:      Pupils: Pupils are equal, round, and reactive to  light.   Cardiovascular:      Rate and Rhythm: Normal rate and regular rhythm.      Pulses: Normal pulses.      Heart sounds: Normal heart sounds.   Pulmonary:      Effort: Pulmonary effort is normal.      Breath sounds: Normal breath sounds.   Abdominal:      General: Abdomen is flat. Bowel sounds are normal.      Palpations: Abdomen is soft.   Musculoskeletal:      Cervical back: Normal range of motion.   Skin:     General: Skin is warm.      Capillary Refill: Capillary refill takes less than 2 seconds.   Neurological:      General: No focal deficit present.      Mental Status: She is alert and oriented to person, place, and time.              CRANIAL NERVES     CN III, IV, VI   Pupils are equal, round, and reactive to light.       Significant Labs: All pertinent labs within the past 24 hours have been reviewed.    Significant Imaging: I have reviewed all pertinent imaging results/findings within the past 24 hours.

## 2023-10-30 NOTE — CARE UPDATE
Patient reports improvement in SOB and lightheadedness however still have palpitations. Electrolytes repleted. Advised to take nadolol (brought from home) as not available at IP pharmacy. Follows with EP Dr Monsalve as OP.Patient reports more stool output 8 -10 liquid BM since starting Wellbutrin. Which probably led to electrolyte disturbance. Discussed with her OP psych PA, Karla Garcia PA-C  who saw patient recently. Agree with d/c wellbutrin and switch back to Remeron. Imodium for loose stools.

## 2023-10-30 NOTE — CONSULTS
[facility] - Observation 11H  Cardiac Electrophysiology  Consult Note    Admission Date: 10/29/2023  Code Status: Full Code   Attending Provider: Laurent Houston MD  Consulting Provider: Dejon Bonilla MD  Principal Problem:Hypocalcemia    Inpatient consult to Electrophysiology  Consult performed by: Dejon Bonilla MD  Consult ordered by: Laurent Houston MD        Subjective:     Chief Complaint:  palpitations     HPI:   Consult reason: symptomatic palpitations    Primary EP: Dr. Monsalve    HPI: 42 y/o F with Crohn's s/p colon resection and ileostomy, symptomatic palpitations on nadolol, and anxiety recently started on welbutrin who presented with shortness of breath and palpitations. After starting welbutrin, she noted severe fluid loss in the ileostomy and started developing symptomatic palpitations. She states her base heart rate after starting nadolol is 50, and starts to have symptoms when HR is in the 80s. EP consulted for evaluation.    Current anti-arrhythmics: nadolol  QTC < 500      Past Medical History:   Diagnosis Date    Abnormal Pap smear 2007    Abnormal Pap smear 5/26/2011    Anemia     Anxiety     Arthritis     C. difficile diarrhea     Crohn's disease     Depression 8/5/2017    Encounter for blood transfusion     Genital HSV     History of colposcopy with cervical biopsy 2007 and 7/2011 2007-LYLA I  and 7/2011- LYLA I    Hypertension     Kidney stone     Kidney stone     Melanoma     Recurrent UTI 4/3/2013    S/P ileostomy 7/9/2012    Sterilization 6/23/2012       Past Surgical History:   Procedure Laterality Date    ABDOMINAL SURGERY      APPENDECTOMY      ARTHROPLASTY OF SHOULDER Left 7/18/2023    Procedure: ARTHROPLASTY, SHOULDER;  Surgeon: Sharon Babb MD;  Location: [facility];  Service: Orthopedics;  Laterality: Left;    AUGMENTATION OF BREAST Bilateral 06/2022    gel implants    BILATERAL SALPINGO-OOPHORECTOMY (BSO) Bilateral 05/30/2019    Procedure:  SALPINGO-OOPHORECTOMY, BILATERAL;  Surgeon: Rupa German MD;  Location: [facility] OR 2ND FLR;  Service: OB/GYN;  Laterality: Bilateral;    BLADDER SURGERY      partial cystectomy due to fistula    breast lift      BREAST SURGERY      CKC      COLON SURGERY      COLONOSCOPY      CYSTOSCOPY  09/23/2020    Procedure: CYSTOSCOPY;  Surgeon: Sascha Florentino MD;  Location: [facility] OR 1ST FLR;  Service: Urology;;    CYSTOSCOPY W/ URETERAL STENT PLACEMENT Left 09/15/2020    Procedure: CYSTOSCOPY, WITH URETERAL STENT INSERTION;  Surgeon: Sascha Florentino MD;  Location: [facility] OR 1ST FLR;  Service: Urology;  Laterality: Left;    DIAGNOSTIC LAPAROSCOPY N/A 07/09/2020    Procedure: LAPAROSCOPY, DIAGNOSTIC;  Surgeon: Gilson El MD;  Location: [facility];  Service: OB/GYN;  Laterality: N/A;    EXCISION OF MELANOMA  07/17/2019    ILEOSTOMY      LAPAROSCOPIC LYSIS OF ADHESIONS N/A 07/09/2020    Procedure: LYSIS, ADHESIONS, LAPAROSCOPIC;  Surgeon: Gilson El MD;  Location: [facility];  Service: OB/GYN;  Laterality: N/A;    LASER LITHOTRIPSY  09/23/2020    Procedure: LITHOTRIPSY, USING LASER;  Surgeon: Sascha Florentino MD;  [facility];  Service: Urology;;    LYSIS OF ADHESIONS N/A 05/30/2019    Procedure: LYSIS, ADHESIONS;  Surgeon: Rupa German MD;  Location: [facility] OR 2ND FLR;  Service: OB/GYN;  Laterality: N/A;    OOPHORECTOMY Right 04/16/2015    PORTACATH PLACEMENT  02/21/2017    SKIN BIOPSY      SMALL INTESTINE SURGERY      age 18 Y    TOTAL ABDOMINAL HYSTERECTOMY  04/16/2015    TOTAL COLECTOMY      TUBAL LIGATION  06/06/2012    UPPER GASTROINTESTINAL ENDOSCOPY      URETEROSCOPIC REMOVAL OF URETERIC CALCULUS  09/23/2020    Procedure: REMOVAL, CALCULUS, URETER, URETEROSCOPIC;  Surgeon: Sascha Florentino MD;  [facility];  Service: Urology;;    URETEROSCOPY Left 09/23/2020    Procedure: URETEROSCOPY;  Surgeon: Sascha Florentino MD;  Location: [facility] OR [facility];  Service: Urology;  Laterality: Left;       Review of patient's allergies indicates:   Allergen Reactions    Azathioprine sodium Other (See Comments)     Other reaction(s): pancreatitis  Other reaction(s): pancreatitis    Methotrexate analogues Other (See Comments)     leukopenia    Stelara [ustekinumab] Other (See Comments)     Multiple infections    Zofran [ondansetron hcl (pf)] Other (See Comments)     Per patient causes prolong QT    Vancomycin analogues Other (See Comments)     Made her red    Azathioprine      Other reaction(s): Unknown    Methotrexate      Other reaction(s): infection-    Morphine Itching and Other (See Comments)     Other reaction(s): Itching    Zofran [ondansetron hcl]      Other reaction(s): Hives    Bactrim [sulfamethoxazole-trimethoprim] Palpitations    Ciprofloxacin Palpitations       No current facility-administered medications on file prior to encounter.     Current Outpatient Medications on File Prior to Encounter   Medication Sig    aspirin (ECOTRIN) 81 MG EC tablet Take 1 tablet (81 mg total) by mouth once daily. For 6 weeks starting the day after surgery. (Patient not taking: Reported on 9/19/2023)    buPROPion (WELLBUTRIN SR) 100 MG TBSR 12 hr tablet Take 1 tablet (100 mg total) by mouth once daily.    clonazePAM (KLONOPIN) 1 MG tablet Take 1 tablet (1 mg total) by mouth 2 (two) times daily.    cyclobenzaprine (FLEXERIL) 10 MG tablet Take 1 tablet (10 mg total) by mouth every evening as needed for muscle pain    droNABinol (MARINOL) 5 MG capsule Take 1 capsule (5 mg total) by mouth 2 (two) times daily before meals.    estradioL (ESTRACE) 0.01 % (0.1 mg/gram) vaginal cream Place 1 g vaginally once daily.    flu vacc [id number] 6mos up,PF, (FLUARIX QUAD [id number], PF,) 60 mcg (15 mcg x 4)/0.5 mL Syrg inject into muscle for one dose    fluconazole (DIFLUCAN) 150 MG Tab Take 1 tablet (150 mg total) by mouth every 72 hours as needed (vaginal yeast).     gabapentin (NEURONTIN) 300 MG capsule Take 1 capsule (300 mg total) by mouth 2 (two) times daily.    HYDROcodone-acetaminophen (NORCO)  mg per tablet Take 1 tablet by mouth every 24 hours as needed for Pain.    loperamide (IMODIUM) 2 mg capsule Take 2 mg by mouth daily as needed for Diarrhea.    mirtazapine (REMERON SOL-TAB) 30 MG disintegrating tablet Take 1 tablet (30 mg total) by mouth nightly.    multivitamin (THERAGRAN) per tablet Take 1 tablet by mouth once daily.    nadoloL (CORGARD) 40 MG tablet Take 1 tablet (40 mg total) by mouth once daily.    pantoprazole (PROTONIX) 40 MG tablet Take 1 tablet (40 mg total) by mouth 2 (two) times daily.    promethazine (PHENERGAN) 25 MG tablet TAKE 1 TABLET BY MOUTH EVERY 8 HOURS FOR NAUSEA    tamsulosin (FLOMAX) 0.4 mg Cap Take 1 capsule (0.4 mg total) by mouth once daily.    triamcinolone acetonide 0.1% (KENALOG) 0.1 % cream Apply topically 2 (two) times daily.    valACYclovir (VALTREX) 1000 MG tablet Take one tablet by mouth twice daily for 5 days    valACYclovir (VALTREX) 500 MG tablet Take 1 tablet (500 mg total) by mouth once daily.    vedolizumab (ENTYVIO) 300 mg SolR injection Inject 300 mg into the vein.    zolpidem (AMBIEN) 10 mg Tab Take 1 tablet (10 mg total) by mouth every evening.     Family History       Problem Relation (Age of Onset)    Breast cancer Maternal Cousin (41)    Cancer Maternal Grandfather, Father    Colon cancer Father    Crohn's disease Brother, Daughter    Diabetes Paternal Grandfather, Maternal Grandfather    Endometrial cancer Maternal Aunt    Hearing loss Paternal Grandmother    Heart disease Maternal Grandfather    Hyperlipidemia Father    Hypertension Mother    Liver cancer Father    Skin cancer Maternal Grandfather          Tobacco Use    Smoking status: Never    Smokeless tobacco: Never   Substance and Sexual Activity    Alcohol use: Not Currently     Alcohol/week: 0.0 standard drinks of alcohol    Drug use: No     Sexual activity: Yes     Partners: Male     Birth control/protection: See Surgical Hx     Comment: HYST     Review of Systems   Constitutional: Negative for chills, fever and night sweats.   HENT:  Negative for congestion and hearing loss.    Eyes:  Negative for blurred vision, discharge, pain and visual disturbance.   Cardiovascular:  Positive for palpitations. Negative for chest pain, dyspnea on exertion, leg swelling and near-syncope.   Respiratory:  Negative for cough, hemoptysis, shortness of breath and wheezing.    Endocrine: Negative for cold intolerance and heat intolerance.   Skin:  Negative for flushing.   Musculoskeletal:  Negative for muscle weakness, myalgias, neck pain and stiffness.   Gastrointestinal:  Negative for abdominal pain, constipation, diarrhea, nausea and vomiting.   Genitourinary:  Negative for dysuria and hematuria.   Neurological:  Negative for focal weakness, headaches, numbness and paresthesias.   Psychiatric/Behavioral:  Negative for altered mental status and memory loss. The patient is not nervous/anxious.      Objective:     Vital Signs (Most Recent):  Temp: 97.2 °F (36.2 °C) (10/30/23 0911)  Pulse: 76 (10/30/23 1504)  Resp: 11 (10/30/23 0829)  BP: 100/64 (10/30/23 0829)  SpO2: 99 % (10/30/23 0829) Vital Signs (24h Range):  Temp:  [94.3 °F (34.6 °C)-98.5 °F (36.9 °C)] 97.2 °F (36.2 °C)  Pulse:  [] 76  Resp:  [11-18] 11  SpO2:  [97 %-99 %] 99 %  BP: (100-143)/(64-98) 100/64       Weight: 45.4 kg (100 lb 1.4 oz)  Body mass index is 18.91 kg/m².    SpO2: 99 %        Physical Exam  Constitutional:       General: She is not in acute distress.     Appearance: Normal appearance. She is not ill-appearing or diaphoretic.   HENT:      Head: Normocephalic and atraumatic.   Eyes:      General: No scleral icterus.        Right eye: No discharge.         Left eye: No discharge.      Extraocular Movements: Extraocular movements intact.      Conjunctiva/sclera: Conjunctivae normal.    Cardiovascular:      Rate and Rhythm: Normal rate and regular rhythm.      Pulses: Normal pulses.   Pulmonary:      Effort: Pulmonary effort is normal. No respiratory distress.   Musculoskeletal:         General: Normal range of motion.   Skin:     General: Skin is warm and dry.   Neurological:      General: No focal deficit present.      Mental Status: She is alert and oriented to person, place, and time.   Psychiatric:         Mood and Affect: Mood normal.         Behavior: Behavior normal.            Significant Labs: All pertinent lab results from the last 24 hours have been reviewed.                Assessment and Plan:     Palpitations  Likely related to intravascular volume depletion from GI fluid loss with contributing initiation of welbutrin  Continue nadolol and fluid resuscitation        Thank you for your consult. I will sign off. Please contact us if you have any additional questions.    Dejon Bonilla MD  Cardiac Electrophysiology  [facility] - Observation 11H

## 2023-10-30 NOTE — NURSING
Pt c/o anxiety, no prn order noted, or diet order noted. Dr Kessler notified, ok to order clonazepam 1mg and regular diet.

## 2023-10-30 NOTE — ED PROVIDER NOTES
Encounter Date: 10/29/2023       History     Chief Complaint   Patient presents with    Palpitations     Started new medication on Thursday -- Wellbutrin, since then has been palpitations,feeling lightheaded and SOB. Hx of QT syndrome      43-year-old female with past medical history of Crohn's s/p ileostomy, anxiety, hypertension department for palpitations, lightheadedness and occasional shortness of breath.  States she was started on Wellbutrin 5 days ago for anxiety states since then she is been having palpitations for the mixed of racing heart in skipped beat sensations.  States she does have occasional lightheadedness which is worse with standing and shortness of breath.  Denies any chest pain, abdominal pain, urinary symptoms or fevers/chills.  States she is concerned about prolonged QT as she is a history of this in the past.  She also reports slight increase in her ostomy output states it is a watery consistency and has had to change her back at least 8 times per day since Wednesday.    The history is provided by the patient.     Review of patient's allergies indicates:   Allergen Reactions    Azathioprine sodium Other (See Comments)     Other reaction(s): pancreatitis  Other reaction(s): pancreatitis    Methotrexate analogues Other (See Comments)     leukopenia    Stelara [ustekinumab] Other (See Comments)     Multiple infections    Zofran [ondansetron hcl (pf)] Other (See Comments)     Per patient causes prolong QT    Vancomycin analogues Other (See Comments)     Made her red    Azathioprine      Other reaction(s): Unknown    Methotrexate      Other reaction(s): infection-    Morphine Itching and Other (See Comments)     Other reaction(s): Itching    Zofran [ondansetron hcl]      Other reaction(s): Hives    Bactrim [sulfamethoxazole-trimethoprim] Palpitations    Ciprofloxacin Palpitations     Past Medical History:   Diagnosis Date    Abnormal Pap smear 2007    Abnormal Pap smear 5/26/2011    Anemia      Anxiety     Arthritis     C. difficile diarrhea     Crohn's disease     Depression 8/5/2017    Encounter for blood transfusion     Genital HSV     History of colposcopy with cervical biopsy 2007 and 7/2011 2007-LYLA I  and 7/2011- LYLA I    Hypertension     Kidney stone     Kidney stone     Melanoma     Recurrent UTI 4/3/2013    S/P ileostomy 7/9/2012    Sterilization 6/23/2012     Past Surgical History:   Procedure Laterality Date    ABDOMINAL SURGERY      APPENDECTOMY      ARTHROPLASTY OF SHOULDER Left 7/18/2023    Procedure: ARTHROPLASTY, SHOULDER;  Surgeon: Sharon Babb MD;  Location: [facility] OR;  Service: Orthopedics;  Laterality: Left;    AUGMENTATION OF BREAST Bilateral 06/2022    gel implants    BILATERAL SALPINGO-OOPHORECTOMY (BSO) Bilateral 05/30/2019    Procedure: SALPINGO-OOPHORECTOMY, BILATERAL;  Surgeon: Rupa German MD;  Location: [facility] OR [address] [place] [address];  Service: OB/GYN;  Laterality: Bilateral;    BLADDER SURGERY      partial cystectomy due to fistula    breast lift      BREAST SURGERY      [facility]      COLON SURGERY      COLONOSCOPY      CYSTOSCOPY  09/23/2020    Procedure: CYSTOSCOPY;  Surgeon: Sascha Florentino MD;  [facility];  Service: Urology;;    CYSTOSCOPY W/ URETERAL STENT PLACEMENT Left 09/15/2020    Procedure: CYSTOSCOPY, WITH URETERAL STENT INSERTION;  Surgeon: Sascha Florentino MD;  [facility];  Service: Urology;  Laterality: Left;    DIAGNOSTIC LAPAROSCOPY N/A 07/09/2020    Procedure: LAPAROSCOPY, DIAGNOSTIC;  Surgeon: Gilson El MD;  Location: [facility] OR;  Service: OB/GYN;  Laterality: N/A;    EXCISION OF MELANOMA  07/17/2019    ILEOSTOMY      LAPAROSCOPIC LYSIS OF ADHESIONS N/A 07/09/2020    Procedure: LYSIS, ADHESIONS, LAPAROSCOPIC;  Surgeon: Gilson El MD;  Location: [facility] OR;  Service: OB/GYN;  Laterality: N/A;    LASER LITHOTRIPSY  09/23/2020    Procedure: LITHOTRIPSY, USING LASER;  Surgeon: Sascha Florentino MD;  Location: [facility] OR [address] [place] [address];   Service: Urology;;    LYSIS OF ADHESIONS N/A 05/30/2019    Procedure: LYSIS, ADHESIONS;  Surgeon: Rupa German MD;  Location: [facility] OR 2ND FLR;  Service: OB/GYN;  Laterality: N/A;    OOPHORECTOMY Right 04/16/2015    PORTACATH PLACEMENT  02/21/2017    SKIN BIOPSY      SMALL INTESTINE SURGERY      age 18 Y    TOTAL ABDOMINAL HYSTERECTOMY  04/16/2015    TOTAL COLECTOMY      TUBAL LIGATION  06/06/2012    UPPER GASTROINTESTINAL ENDOSCOPY      URETEROSCOPIC REMOVAL OF URETERIC CALCULUS  09/23/2020    Procedure: REMOVAL, CALCULUS, URETER, URETEROSCOPIC;  Surgeon: Sascha Florentino MD;  [facility];  Service: Urology;;    URETEROSCOPY Left 09/23/2020    Procedure: URETEROSCOPY;  Surgeon: Sascha Florentino MD;  [facility];  Service: Urology;  Laterality: Left;     Family History   Problem Relation Age of Onset    Diabetes Paternal Grandfather     Hearing loss Paternal Grandmother     Cancer Maternal Grandfather         Skin    Skin cancer Maternal Grandfather     Diabetes Maternal Grandfather     Heart disease Maternal Grandfather     Colon cancer Father     Cancer Father         Colon    Liver cancer Father     Hyperlipidemia Father     Hypertension Mother     Crohn's disease Brother     Endometrial cancer Maternal Aunt     Breast cancer Maternal Cousin 41    Crohn's disease Daughter     Ovarian cancer Neg Hx     Melanoma Neg Hx      Social History     Tobacco Use    Smoking status: Never    Smokeless tobacco: Never   Substance Use Topics    Alcohol use: Not Currently     Alcohol/week: 0.0 standard drinks of alcohol    Drug use: No     Review of Systems   Constitutional:  Negative for chills and fever.   HENT:  Negative for sore throat.    Respiratory:  Positive for shortness of breath.    Cardiovascular:  Positive for palpitations. Negative for chest pain.   Gastrointestinal:  Negative for abdominal pain, nausea and vomiting.   Genitourinary:  Negative for difficulty urinating and  dysuria.   Musculoskeletal: Negative.    Skin: Negative.    Neurological:  Positive for light-headedness. Negative for weakness.   Psychiatric/Behavioral:  Negative for confusion.        Physical Exam     Initial Vitals   BP Pulse Resp Temp SpO2   10/29/23 2053 10/29/23 2050 10/29/23 2050 10/29/23 2050 10/29/23 2050   (!) 143/98 109 15 98.4 °F (36.9 °C) 98 %      MAP       --                Physical Exam    Nursing note and vitals reviewed.  Constitutional: She appears well-developed and well-nourished.   HENT:   Head: Normocephalic and atraumatic.   Eyes: Pupils are equal, round, and reactive to light.   Neck: Neck supple.   Normal range of motion.  Cardiovascular:  Normal rate.           Pulmonary/Chest: Breath sounds normal.   Abdominal: Abdomen is soft. There is no abdominal tenderness.   Patient has ostomy in the right lower quadrant.  There is an opaque bag in place unable to visualize contents   Musculoskeletal:         General: Normal range of motion.      Cervical back: Normal range of motion and neck supple.     Neurological: She is alert and oriented to person, place, and time. GCS score is 15. GCS eye subscore is 4. GCS verbal subscore is 5. GCS motor subscore is 6.   Skin: Skin is warm and dry. Capillary refill takes less than 2 seconds.         ED Course   Procedures  Labs Reviewed   URINALYSIS, REFLEX TO URINE CULTURE - Abnormal; Notable for the following components:       Result Value    Leukocytes, UA 1+ (*)     All other components within normal limits    Narrative:     Specimen Source->Urine   MAGNESIUM - Abnormal; Notable for the following components:    Magnesium 1.2 (*)     All other components within normal limits   COMPREHENSIVE METABOLIC PANEL - Abnormal; Notable for the following components:    Chloride 122 (*)     CO2 13 (*)     Glucose 62 (*)     Calcium 5.5 (*)     Total Protein 4.6 (*)     Albumin 2.1 (*)     Alkaline Phosphatase 49 (*)     ALT 8 (*)     Anion Gap 5 (*)     All other  components within normal limits   COMPREHENSIVE METABOLIC PANEL - Abnormal; Notable for the following components:    CO2 17 (*)     All other components within normal limits   URINALYSIS MICROSCOPIC - Abnormal; Notable for the following components:    WBC, UA 9 (*)     All other components within normal limits    Narrative:     Specimen Source->Urine   CBC W/ AUTO DIFFERENTIAL   D DIMER, QUANTITATIVE   TSH   TROPONIN I   MAGNESIUM   ISTAT CHEM8          Imaging Results              X-Ray Chest PA And Lateral (Final result)  Result time 10/29/23 23:52:23      Final result by Frank Linares DO (10/29/23 23:52:23)                   Impression:      No acute abnormality.      Electronically signed by: Frank Linares  Date:    10/29/2023  Time:    23:52               Narrative:    EXAMINATION:  XR CHEST PA AND LATERAL    CLINICAL HISTORY:  Palpitations    TECHNIQUE:  PA and lateral views of the chest were performed.    COMPARISON:  09/17/2020.    FINDINGS:  The left chest wall port, unchanged in position from prior.  There are postop changes of the left shoulder.  The lungs are well expanded and clear. No focal opacities are seen. The pleural spaces are clear. The cardiac silhouette is unremarkable. The visualized osseous structures are unremarkable.  There are bilateral breast implants.                                       Medications   calcium gluconate 1g in D5W 50mL (ready to mix) (has no administration in time range)   magnesium sulfate 2g in water 50mL IVPB (premix) (has no administration in time range)   lactated ringers bolus 1,000 mL (1,000 mLs Intravenous New Bag 10/29/23 0213)     Medical Decision Making  43-year-old female with past medical history of Crohn's presents emergency department for palpitations shortness breath and lightheadedness.  Patient recently started on Wellbutrin for anxiety and is concerned that this may be a and adverse reaction.      Differential includes but not limited to adverse  medication reaction, electrolyte derangement, dehydration, PE, thyrotoxicosis    On arrival to the ED EKG shows normal sinus rhythm at a rate of 93.  She does have T-wave inversions in V3 which appear to be new compared to previous.  Does not meet STEMI criteria.  Denies any chest pain low suspicion for ACS.  Increased ostomy output since starting Wellbutrin and her Mag and calcium were noted to be low.  Her calcium was critically low at 5.5 which I suspect is from volume loss.  There was a 2nd labs sample sent accidentally however this repeat CMP did show normal calcium.  We did obtain a i-STAT afterwards to confirm her hypocalcemia which showed an ionized calcium of 0.89.  She was given IV fluids, Mag sulfate and calcium gluconate in the ED. given that her repeat calcium did show to be low will continue with admission for further management.    Amount and/or Complexity of Data Reviewed  Labs: ordered. Decision-making details documented in ED Course.  Radiology: ordered.    Risk  Prescription drug management.               ED Course as of 10/30/23 0048   Sun Oct 29, 2023   2229 D-Dimer: 0.43  Doubt PE [HJ]   2254 Troponin I: <0.006  Within normal limits [HJ]   2304 TSH: 1.635  Within normal limits [HJ]   2304 WBC: 6.78  No leukocytosis [HJ]   2304 Hemoglobin: 14.6  No anemia [HJ]   2354 Calcium(!!): 5.5 [HJ]   2354 Magnesium (!): 1.2 [HJ]      ED Course User Index  [HJ] Anisha Vicente PA-C                    Clinical Impression:   Final diagnoses:  [R00.2] Palpitations  [E83.42] Hypomagnesemia  [E83.51] Hypocalcemia (Primary)        ED Disposition Condition    Observation Stable                Anisha Vicente PA-C  10/30/23 0001       Anisha Vicente PA-C  10/30/23 0048

## 2023-10-30 NOTE — ED TRIAGE NOTES
Ivy Salgado, a 43 y.o. female presents to the ED w/ complaint of dizziness and palpitations since just pta    Triage note:  Chief Complaint   Patient presents with    Palpitations     Started new medication on Thursday -- Wellbutrin, since then has been palpitations,feeling lightheaded and SOB. Hx of QT syndrome      Review of patient's allergies indicates:   Allergen Reactions    Azathioprine sodium Other (See Comments)     Other reaction(s): pancreatitis  Other reaction(s): pancreatitis    Methotrexate analogues Other (See Comments)     leukopenia    Stelara [ustekinumab] Other (See Comments)     Multiple infections    Zofran [ondansetron hcl (pf)] Other (See Comments)     Per patient causes prolong QT    Vancomycin analogues Other (See Comments)     Made her red    Azathioprine      Other reaction(s): Unknown    Methotrexate      Other reaction(s): infection-    Morphine Itching and Other (See Comments)     Other reaction(s): Itching    Zofran [ondansetron hcl]      Other reaction(s): Hives    Bactrim [sulfamethoxazole-trimethoprim] Palpitations    Ciprofloxacin Palpitations     Past Medical History:   Diagnosis Date    Abnormal Pap smear 2007    Abnormal Pap smear 5/26/2011    Anemia     Anxiety     Arthritis     C. difficile diarrhea     Crohn's disease     Depression 8/5/2017    Encounter for blood transfusion     Genital HSV     History of colposcopy with cervical biopsy 2007 and 7/2011 2007-LYLA I  and 7/2011- LYLA I    Hypertension     Kidney stone     Kidney stone     Melanoma     Recurrent UTI 4/3/2013    S/P ileostomy 7/9/2012    Sterilization 6/23/2012      LOC: The patient is awake, alert, aware of environment with an appropriate affect. Oriented x4, speaking appropriately  APPEARANCE: Pt resting comfortably, in no acute distress, pt is clean and well groomed, clothing properly fastened  SKIN:The skin is warm and dry, color consistent with ethnicity, patient has normal skin turgor and moist mucus  membranes, no bruising noted   RESPIRATORY:Airway is open and patent, respirations are spontaneous, patient has a normal effort and rate, no accessory muscle use noted. Sob resolved  CARDIAC: tachy rate and rhythm, no peripheral edema noted, capillary refill < 3 seconds, bilateral radial pulses 2+. Pt complaining of palpitations today  ABDOMEN: Soft, non tender, non distended. Colostomy present. Denies n/v/d  NEUROLOGIC: PERRLA, facial expression is symmetrical, patient moving all extremities spontaneously, normal sensation in all extremities when touched with a finger.  Follows all commands appropriately. Dizziness present  MUSCULOSKELETAL: Patient moving all extremities spontaneously, no obvious swelling or deformities noted.

## 2023-10-30 NOTE — H&P
[facility] - Emergency Dept  Hospital Medicine  History & Physical    Patient Name: Ivy Salgado  MRN: [id number]  Patient Class: OP- Observation  Admission Date: 10/29/2023  Attending Physician: Laurent Houston MD   Primary Care Provider: Luis Madden DO         Patient information was obtained from patient and ER records.     Subjective:     Principal Problem:<principal problem not specified>    Chief Complaint:   Chief Complaint   Patient presents with    Palpitations     Started new medication on Thursday -- Wellbutrin, since then has been palpitations,feeling lightheaded and SOB. Hx of QT syndrome         HPI: 43-year-old female with past medical history of Crohn's disease with permanent colostomy, history of long QT on nadolol, generalized anxiety disorder presents to the emergency room because of shortness a breath and palpitations.  Patient was recently evaluated by her psychiatrist who changed her Remeron to Wellbutrin and patient started noticing increased output from her ileostomy back and also started noticing shortness of breath on palpation since last night.  Patient states she was short of breath even at rest denies having any orthopnea PND bilateral lower extremity edema.  Patient also complains of having palpitations.  Denies having any presyncopal or syncopal events.  EKG in the emergency room did not show any prolonged QTC.      Past Medical History:   Diagnosis Date    Abnormal Pap smear 2007    Abnormal Pap smear 5/26/2011    Anemia     Anxiety     Arthritis     C. difficile diarrhea     Crohn's disease     Depression 8/5/2017    Encounter for blood transfusion     Genital HSV     History of colposcopy with cervical biopsy 2007 and 7/2011 2007-LYLA I  and 7/2011- LYLA I    Hypertension     Kidney stone     Kidney stone     Melanoma     Recurrent UTI 4/3/2013    S/P ileostomy 7/9/2012    Sterilization 6/23/2012       Past Surgical History:   Procedure Laterality Date     ABDOMINAL SURGERY      APPENDECTOMY      ARTHROPLASTY OF SHOULDER Left 7/18/2023    Procedure: ARTHROPLASTY, SHOULDER;  Surgeon: Sharon Babb MD;  [facility] OR;  Service: Orthopedics;  Laterality: Left;    AUGMENTATION OF BREAST Bilateral 06/2022    gel implants    BILATERAL SALPINGO-OOPHORECTOMY (BSO) Bilateral 05/30/2019    Procedure: SALPINGO-OOPHORECTOMY, BILATERAL;  Surgeon: Rupa German MD;  Location: [facility] OR [address] [place] [address];  Service: OB/GYN;  Laterality: Bilateral;    BLADDER SURGERY      partial cystectomy due to fistula    breast lift      BREAST SURGERY      [facility]      COLON SURGERY      COLONOSCOPY      CYSTOSCOPY  09/23/2020    Procedure: CYSTOSCOPY;  Surgeon: Sascha Florentino MD;  [facility];  Service: Urology;;    CYSTOSCOPY W/ URETERAL STENT PLACEMENT Left 09/15/2020    Procedure: CYSTOSCOPY, WITH URETERAL STENT INSERTION;  Surgeon: Sascha Florentino MD;  [facility];  Service: Urology;  Laterality: Left;    DIAGNOSTIC LAPAROSCOPY N/A 07/09/2020    Procedure: LAPAROSCOPY, DIAGNOSTIC;  Surgeon: Gilson El MD;  Location: [facility];  Service: OB/GYN;  Laterality: N/A;    EXCISION OF MELANOMA  07/17/2019    ILEOSTOMY      LAPAROSCOPIC LYSIS OF ADHESIONS N/A 07/09/2020    Procedure: LYSIS, ADHESIONS, LAPAROSCOPIC;  Surgeon: Gilson El MD;  Location: [facility];  Service: OB/GYN;  Laterality: N/A;    LASER LITHOTRIPSY  09/23/2020    Procedure: LITHOTRIPSY, USING LASER;  Surgeon: Sascha Florentino MD;  [facility];  Service: Urology;;    LYSIS OF ADHESIONS N/A 05/30/2019    Procedure: LYSIS, ADHESIONS;  Surgeon: Rupa German MD;  [facility];  Service: OB/GYN;  Laterality: N/A;    OOPHORECTOMY Right 04/16/2015    PORTACATH PLACEMENT  02/21/2017    SKIN BIOPSY      SMALL INTESTINE SURGERY      age 18 Y    TOTAL ABDOMINAL HYSTERECTOMY  04/16/2015    TOTAL COLECTOMY      TUBAL LIGATION  06/06/2012    UPPER  GASTROINTESTINAL ENDOSCOPY      URETEROSCOPIC REMOVAL OF URETERIC CALCULUS  09/23/2020    Procedure: REMOVAL, CALCULUS, URETER, URETEROSCOPIC;  Surgeon: Sascha Florentino MD;  Location: [facility] OR [address] [place] [address];  Service: Urology;;    URETEROSCOPY Left 09/23/2020    Procedure: URETEROSCOPY;  Surgeon: Sascha Florentino MD;  Location: [facility] OR [address] [place] [address];  Service: Urology;  Laterality: Left;       Review of patient's allergies indicates:   Allergen Reactions    Azathioprine sodium Other (See Comments)     Other reaction(s): pancreatitis  Other reaction(s): pancreatitis    Methotrexate analogues Other (See Comments)     leukopenia    Stelara [ustekinumab] Other (See Comments)     Multiple infections    Zofran [ondansetron hcl (pf)] Other (See Comments)     Per patient causes prolong QT    Vancomycin analogues Other (See Comments)     Made her red    Azathioprine      Other reaction(s): Unknown    Methotrexate      Other reaction(s): infection-    Morphine Itching and Other (See Comments)     Other reaction(s): Itching    Zofran [ondansetron hcl]      Other reaction(s): Hives    Bactrim [sulfamethoxazole-trimethoprim] Palpitations    Ciprofloxacin Palpitations       Current Facility-Administered Medications on File Prior to Encounter   Medication    estradiol valerate (DELESTROGEN) injection 20 mg/mL    estradiol valerate injection 20 mg     Current Outpatient Medications on File Prior to Encounter   Medication Sig    aspirin (ECOTRIN) 81 MG EC tablet Take 1 tablet (81 mg total) by mouth once daily. For 6 weeks starting the day after surgery. (Patient not taking: Reported on 9/19/2023)    buPROPion (WELLBUTRIN SR) 100 MG TBSR 12 hr tablet Take 1 tablet (100 mg total) by mouth once daily.    clonazePAM (KLONOPIN) 1 MG tablet Take 1 tablet (1 mg total) by mouth 2 (two) times daily.    cyclobenzaprine (FLEXERIL) 10 MG tablet Take 1 tablet (10 mg total) by mouth every evening as needed for muscle pain     droNABinol (MARINOL) 5 MG capsule Take 1 capsule (5 mg total) by mouth 2 (two) times daily before meals.    estradioL (ESTRACE) 0.01 % (0.1 mg/gram) vaginal cream Place 1 g vaginally once daily.    flu vacc [id number] 6mos up,PF, (FLUARIX QUAD 6749-7020, PF,) 60 mcg (15 mcg x 4)/0.5 mL Syrg inject into muscle for one dose    fluconazole (DIFLUCAN) 150 MG Tab Take 1 tablet (150 mg total) by mouth every 72 hours as needed (vaginal yeast).    gabapentin (NEURONTIN) 300 MG capsule Take 1 capsule (300 mg total) by mouth 2 (two) times daily.    HYDROcodone-acetaminophen (NORCO)  mg per tablet Take 1 tablet by mouth every 24 hours as needed for Pain.    loperamide (IMODIUM) 2 mg capsule Take 2 mg by mouth daily as needed for Diarrhea.    mirtazapine (REMERON SOL-TAB) 30 MG disintegrating tablet Take 1 tablet (30 mg total) by mouth nightly.    multivitamin (THERAGRAN) per tablet Take 1 tablet by mouth once daily.    nadoloL (CORGARD) 40 MG tablet Take 1 tablet (40 mg total) by mouth once daily.    pantoprazole (PROTONIX) 40 MG tablet Take 1 tablet (40 mg total) by mouth 2 (two) times daily.    promethazine (PHENERGAN) 25 MG tablet TAKE 1 TABLET BY MOUTH EVERY 8 HOURS FOR NAUSEA    tamsulosin (FLOMAX) 0.4 mg Cap Take 1 capsule (0.4 mg total) by mouth once daily.    triamcinolone acetonide 0.1% (KENALOG) 0.1 % cream Apply topically 2 (two) times daily.    valACYclovir (VALTREX) 1000 MG tablet Take one tablet by mouth twice daily for 5 days    valACYclovir (VALTREX) 500 MG tablet Take 1 tablet (500 mg total) by mouth once daily.    vedolizumab (ENTYVIO) 300 mg SolR injection Inject 300 mg into the vein.    zolpidem (AMBIEN) 10 mg Tab Take 1 tablet (10 mg total) by mouth every evening.     Family History       Problem Relation (Age of Onset)    Breast cancer Maternal Cousin (41)    Cancer Maternal Grandfather, Father    Colon cancer Father    Crohn's disease Brother, Daughter    Diabetes Paternal  Grandfather, Maternal Grandfather    Endometrial cancer Maternal Aunt    Hearing loss Paternal Grandmother    Heart disease Maternal Grandfather    Hyperlipidemia Father    Hypertension Mother    Liver cancer Father    Skin cancer Maternal Grandfather          Tobacco Use    Smoking status: Never    Smokeless tobacco: Never   Substance and Sexual Activity    Alcohol use: Not Currently     Alcohol/week: 0.0 standard drinks of alcohol    Drug use: No    Sexual activity: Yes     Partners: Male     Birth control/protection: See Surgical Hx     Comment: HYST     Review of Systems   Constitutional: Negative.    HENT: Negative.     Eyes: Negative.    Respiratory:  Positive for shortness of breath.    Cardiovascular:  Positive for palpitations.   Gastrointestinal: Negative.    Endocrine: Negative.    Genitourinary: Negative.      Objective:     Vital Signs (Most Recent):  Temp: 98.5 °F (36.9 °C) (10/29/23 2317)  Pulse: (S) 84 (10/29/23 2347)  Resp: 16 (10/29/23 2317)  BP: (S) 126/81 (10/29/23 2347)  SpO2: 97 % (10/29/23 2317) Vital Signs (24h Range):  Temp:  [98.4 °F (36.9 °C)-98.5 °F (36.9 °C)] 98.5 °F (36.9 °C)  Pulse:  [] 84  Resp:  [15-16] 16  SpO2:  [97 %-98 %] 97 %  BP: (125-143)/(75-98) 126/81     Weight: 46.7 kg (103 lb)  Body mass index is 19.46 kg/m².     Physical Exam  Constitutional:       Appearance: Normal appearance.   HENT:      Head: Normocephalic and atraumatic.   Eyes:      Pupils: Pupils are equal, round, and reactive to light.   Cardiovascular:      Rate and Rhythm: Normal rate and regular rhythm.      Pulses: Normal pulses.      Heart sounds: Normal heart sounds.   Pulmonary:      Effort: Pulmonary effort is normal.      Breath sounds: Normal breath sounds.   Abdominal:      General: Abdomen is flat. Bowel sounds are normal.      Palpations: Abdomen is soft.   Musculoskeletal:      Cervical back: Normal range of motion.   Skin:     General: Skin is warm.      Capillary Refill: Capillary  refill takes less than 2 seconds.   Neurological:      General: No focal deficit present.      Mental Status: She is alert and oriented to person, place, and time.              CRANIAL NERVES     CN III, IV, VI   Pupils are equal, round, and reactive to light.       Significant Labs: All pertinent labs within the past 24 hours have been reviewed.    Significant Imaging: I have reviewed all pertinent imaging results/findings within the past 24 hours.    Assessment/Plan:       S/P ileostomy    Patient has increased output since she was started on Wellbutrin.  Will hold it and replete with IV fluids.  Also resume Imodium.      Hypocalcemia  Patient initially low calcium on blood work but repeat was normal however ionized calcium was slightly normal.  Received IV calcium gluconate in the emergency room.  Replete magnesium     History of prolonged Q-T interval on ECG    Patient is currently on nadolol for history of long QT and follows with EP as outpatient.  Patient was advised to take her home medication of nadolol as we do not carry it in the hospital    Generalized anxiety disorder    On Wellbutrin and clonazepam as needed.  Will hold off on Wellbutrin and continue clonazepam for now.  Advised to follow up with Psychiatry.  EKG did not show any prolonged QTC      VTE Risk Mitigation (From admission, onward)         Ordered     IP VTE LOW RISK PATIENT  Once         10/30/23 0107     Place JEANNA hose  Until discontinued         10/30/23 0107               dvt prop- scd        On 10/30/2023, patient should be placed in hospital observation services        Alejo Kessler MD  Department of Hospital Medicine  [facility] - Emergency Dept    COMPLETED  Family history is reviewed and has not changed   Pertinent information:

## 2023-10-30 NOTE — HPI
43-year-old female with past medical history of Crohn's disease with permanent colostomy, history of long QT on nadolol, generalized anxiety disorder presents to the emergency room because of shortness a breath and palpitations.  Patient was recently evaluated by her psychiatrist who changed her Remeron to Wellbutrin and patient started noticing increased output from her ileostomy back and also started noticing shortness of breath on palpation since last night.  Patient states she was short of breath even at rest denies having any orthopnea PND bilateral lower extremity edema.  Patient also complains of having palpitations.  Denies having any presyncopal or syncopal events.  EKG in the emergency room did not show any prolonged QTC.

## 2023-10-30 NOTE — HPI
Consult reason: symptomatic palpitations    Primary EP: Dr. Monsalve    HPI: 42 y/o F with Crohn's s/p colon resection and ileostomy, symptomatic palpitations on nadolol, and anxiety recently started on welbutrin who presented with shortness of breath and palpitations. After starting welbutrin, she noted severe fluid loss in the ileostomy and started developing symptomatic palpitations. She states her base heart rate after starting nadolol is 50, and starts to have symptoms when HR is in the 80s. EP consulted for evaluation.    Current anti-arrhythmics: nadolol  QTC < 500

## 2023-10-30 NOTE — PLAN OF CARE
Pt admitted to unit AAOX4. Ablet verbalize needs. Ambulates w/o difficulty. Resp even and unlabored. Tele monitor intact. Colostomy bag to RLQ intact/emptied per pt. Cont of B/B. LCW port intact, not accessed. NS infusing at 100ml/hr to lt hand PIV. NAD noted at present. Will continue to monitor.  Problem: Adult Inpatient Plan of Care  Goal: Plan of Care Review  10/30/2023 0233 by Rachel Henderson, MIKE  Outcome: Ongoing, Progressing  10/30/2023 0232 by Rachel Henderson, RN  Outcome: Ongoing, Progressing     Problem: Hypertension Comorbidity  Goal: Blood Pressure in Desired Range  Outcome: Ongoing, Progressing

## 2023-10-30 NOTE — NURSING
Nurses Note -- 4 Eyes      10/30/2023   6:36 AM      Skin assessed during: Admit      [x] No Altered Skin Integrity Present    []Prevention Measures Documented      [] Yes- Altered Skin Integrity Present or Discovered   [] LDA Added if Not in Epic (Describe Wound)   [] New Altered Skin Integrity was Present on Admit and Documented in LDA   [] Wound Image Taken    Wound Care Consulted? No    Attending Nurse:  Rachel Donaldson RN/Staff Member:   Aracelis

## 2023-10-30 NOTE — ED NOTES
Telemetry Verification   Patient placed on Telemetry Box  Verified with War Room      Monitor Tech Amelia   Rate 70   Rhythm NS

## 2023-10-30 NOTE — ED NOTES
I-STAT Chem-8+ Results:   Value Reference Range   Sodium 143 136-145 mmol/L   Potassium  2.9 3.5-5.1 mmol/L   Chloride 111  mmol/L   Ionized Calcium 0.89 1.06-1.42 mmol/L   CO2 (measured) 13 23-29 mmol/L   Glucose 46  mg/dL   BUN 5 6-30 mg/dL   Creatinine <0.2 0.5-1.4 mg/dL   Hematocrit 18 36-54%

## 2023-10-30 NOTE — ASSESSMENT & PLAN NOTE
Patient has increased output since she was started on Wellbutrin.  Will hold it and replete with IV fluids.  Also resume Imodium.

## 2023-10-30 NOTE — ASSESSMENT & PLAN NOTE
Patient initially low calcium on blood work but repeat was normal however ionized calcium was slightly normal.  Received IV calcium gluconate in the emergency room.  Replete magnesium

## 2023-10-31 ENCOUNTER — TELEPHONE (OUTPATIENT)
Dept: INTERNAL MEDICINE | Facility: CLINIC | Age: 43
End: 2023-10-31
Payer: COMMERCIAL

## 2023-10-31 VITALS
RESPIRATION RATE: 18 BRPM | SYSTOLIC BLOOD PRESSURE: 102 MMHG | HEART RATE: 70 BPM | TEMPERATURE: 97 F | HEIGHT: 61 IN | WEIGHT: 100.06 LBS | OXYGEN SATURATION: 100 % | DIASTOLIC BLOOD PRESSURE: 57 MMHG | BODY MASS INDEX: 18.89 KG/M2

## 2023-10-31 PROCEDURE — 25000003 PHARM REV CODE 250: Performed by: STUDENT IN AN ORGANIZED HEALTH CARE EDUCATION/TRAINING PROGRAM

## 2023-10-31 PROCEDURE — 25000003 PHARM REV CODE 250: Performed by: HOSPITALIST

## 2023-10-31 PROCEDURE — 63600175 PHARM REV CODE 636 W HCPCS: Performed by: HOSPITALIST

## 2023-10-31 PROCEDURE — G0378 HOSPITAL OBSERVATION PER HR: HCPCS

## 2023-10-31 RX ORDER — MIRTAZAPINE 30 MG/1
30 TABLET, ORALLY DISINTEGRATING ORAL NIGHTLY
Qty: 30 TABLET | Refills: 0 | Status: SHIPPED | OUTPATIENT
Start: 2023-10-31

## 2023-10-31 RX ADMIN — CLONAZEPAM 1 MG: 0.5 TABLET ORAL at 08:10

## 2023-10-31 RX ADMIN — SUCRALFATE 1 G: 1 SUSPENSION ORAL at 06:10

## 2023-10-31 RX ADMIN — ASPIRIN 81 MG: 81 TABLET, COATED ORAL at 08:10

## 2023-10-31 RX ADMIN — TAMSULOSIN HYDROCHLORIDE 0.4 MG: 0.4 CAPSULE ORAL at 08:10

## 2023-10-31 RX ADMIN — PANTOPRAZOLE SODIUM 40 MG: 40 TABLET, DELAYED RELEASE ORAL at 08:10

## 2023-10-31 RX ADMIN — ALUMINUM HYDROXIDE, MAGNESIUM HYDROXIDE, AND SIMETHICONE 30 ML: 200; 200; 20 SUSPENSION ORAL at 06:10

## 2023-10-31 RX ADMIN — DRONABINOL 5 MG: 2.5 CAPSULE ORAL at 06:10

## 2023-10-31 RX ADMIN — ALUMINUM HYDROXIDE, MAGNESIUM HYDROXIDE, AND SIMETHICONE 30 ML: 200; 200; 20 SUSPENSION ORAL at 12:10

## 2023-10-31 RX ADMIN — GABAPENTIN 300 MG: 300 CAPSULE ORAL at 08:10

## 2023-10-31 RX ADMIN — SUCRALFATE 1 G: 1 SUSPENSION ORAL at 12:10

## 2023-10-31 RX ADMIN — NADOLOL 40 MG: 20 TABLET ORAL at 08:10

## 2023-10-31 NOTE — DISCHARGE SUMMARY
[facility] - Observation [address] [place] [address] Medicine  Discharge Summary      Patient Name: Ivy Salgado  MRN: [id number]  IGNACIO: [id number]  Patient Class: OP- Observation  Admission Date: 10/29/2023  Hospital Length of Stay: 0 days  Discharge Date and Time:  10/31/2023 11:56 AM  Attending Physician: Laurent Houston MD   Discharging Provider: Laurent Houston MD  Primary Care Provider: Luis Madden DO  [facility] Medicine Team: [facility] Laurent Houston MD  Primary Care Team: [facility]    HPI:   43-year-old female with past medical history of Crohn's disease with permanent colostomy, history of long QT on nadolol, generalized anxiety disorder presents to the emergency room because of shortness a breath and palpitations.  Patient was recently evaluated by her psychiatrist who changed her Remeron to Wellbutrin and patient started noticing increased output from her ileostomy back and also started noticing shortness of breath on palpation since last night.  Patient states she was short of breath even at rest denies having any orthopnea PND bilateral lower extremity edema.  Patient also complains of having palpitations.  Denies having any presyncopal or syncopal events.  EKG in the emergency room did not show any prolonged QTC.      * No surgery found *      Hospital Course:   Patient reports improvement in SOB and lightheadedness. Electrolytes repleted. For symptomatic palpitations consulted EP. Follows with EP Dr Monsalve as OP. Suspect likely sec to dehydration iso of increased stool output after Wellbutrin use. Advised keeping well hydrated and continue to take nadolol (brought from home) as not available at IP pharmacy. Patient reports more stool output 8 -10 liquid BM since starting Wellbutrin. Which probably led to electrolyte disturbance. Discussed with her OP psych PA, Karla Garcia PA-C  who saw patient recently. Agree with d/c wellbutrin and switch back to Remeron 30mg QHS. Prn Imodium for loose  stools.   On 10/31, patient reports resolution of all symptoms she presented with including palpations. Patient is currently medically and HDS. She is being d/c home. Fu with PCP. Plan of care discussed with patient, verbalized understanding. All questions were answered.         Goals of Care Treatment Preferences:  Code Status: Full Code    Living Will: Yes              Consults:   Consults (From admission, onward)        Status Ordering Provider     Inpatient consult to Electrophysiology  Once        Provider:  (Not yet assigned)    Completed BUD, SEEMAL          No new Assessment & Plan notes have been filed under this hospital service since the last note was generated.  Service: Hospital Medicine    Final Active Diagnoses:    Diagnosis Date Noted POA    PRINCIPAL PROBLEM:  Hypocalcemia [E83.51] 10/30/2023 Unknown    History of prolonged Q-T interval on ECG [Z87.898] 11/16/2020 Not Applicable    Palpitations [R00.2] 08/30/2018 Yes    Generalized anxiety disorder [F41.1] 09/18/2013 Yes    S/P ileostomy [Z93.2] 07/09/2012 Not Applicable     Chronic      Problems Resolved During this Admission:       Discharged Condition: stable    Disposition: Home or Self Care    Follow Up:   Follow-up Information     Luis Madden, DO Follow up in 1 week(s).    Specialty: Family Medicine  Why: The Clinic Nurse will contact you with an appointment. If you do not hear from them in 48 hrs, please call [phone]  Contact information:  [address]  [address]  [facility] [address]  [phone]                       Patient Instructions:      Ambulatory referral/consult to Outpatient Case Management   Referral Priority: Routine Referral Type: Consultation   Referral Reason: Specialty Services Required   Number of Visits Requested: 1       Significant Diagnostic Studies: N/A    Pending Diagnostic Studies:     None         Medications:  Reconciled Home Medications:      Medication List      CHANGE how you take these  medications    valACYclovir 500 MG tablet  Commonly known as: VALTREX  Take 1 tablet (500 mg total) by mouth once daily.  What changed: Another medication with the same name was removed. Continue taking this medication, and follow the directions you see here.        CONTINUE taking these medications    clonazePAM 1 MG tablet  Commonly known as: KlonoPIN  Take 1 tablet (1 mg total) by mouth 2 (two) times daily.     cyclobenzaprine 10 MG tablet  Commonly known as: FLEXERIL  Take 1 tablet (10 mg total) by mouth every evening as needed for muscle pain     droNABinol 5 MG capsule  Commonly known as: MARINOL  Take 1 capsule (5 mg total) by mouth 2 (two) times daily before meals.     estradioL 0.01 % (0.1 mg/gram) vaginal cream  Commonly known as: ESTRACE  Place 1 g vaginally once daily.     FLUARIX QUAD [id number] (PF) 60 mcg (15 mcg x 4)/0.5 mL Syrg  Generic drug: flu vacc [id number] 6mos up(PF)  inject into muscle for one dose     fluconazole 150 MG Tab  Commonly known as: DIFLUCAN  Take 1 tablet (150 mg total) by mouth every 72 hours as needed (vaginal yeast).     gabapentin 300 MG capsule  Commonly known as: NEURONTIN  Take 1 capsule (300 mg total) by mouth 2 (two) times daily.     HYDROcodone-acetaminophen  mg per tablet  Commonly known as: NORCO  Take 1 tablet by mouth every 24 hours as needed for Pain.     loperamide 2 mg capsule  Commonly known as: IMODIUM  Take 2 mg by mouth daily as needed for Diarrhea.     mirtazapine 30 MG disintegrating tablet  Commonly known as: REMERON SOL-TAB  Take 1 tablet (30 mg total) by mouth nightly.     multivitamin per tablet  Commonly known as: THERAGRAN  Take 1 tablet by mouth once daily.     nadoloL 40 MG tablet  Commonly known as: CORGARD  Take 1 tablet (40 mg total) by mouth once daily.     pantoprazole 40 MG tablet  Commonly known as: PROTONIX  Take 1 tablet (40 mg total) by mouth 2 (two) times daily.     promethazine 25 MG tablet  Commonly known as: PHENERGAN  TAKE 1  TABLET BY MOUTH EVERY 8 HOURS FOR NAUSEA     tamsulosin 0.4 mg Cap  Commonly known as: FLOMAX  Take 1 capsule (0.4 mg total) by mouth once daily.     triamcinolone acetonide 0.1% 0.1 % cream  Commonly known as: KENALOG  Apply topically 2 (two) times daily.     vedolizumab 300 mg Solr injection  Commonly known as: ENTYVIO  Inject 300 mg into the vein.     zolpidem 10 mg Tab  Commonly known as: AMBIEN  Take 1 tablet (10 mg total) by mouth every evening.        STOP taking these medications    aspirin 81 MG EC tablet  Commonly known as: ECOTRIN     buPROPion 100 MG TBSR 12 hr tablet  Commonly known as: WELLBUTRIN SR            Indwelling Lines/Drains at time of discharge:   Lines/Drains/Airways     Drain  Duration                Ileostomy 06/16/12 0000 RLQ 4154 days                Time spent on the discharge of patient: 35 minutes         Laurent Houston MD  Department of Hospital Medicine  [facility] - Observation 11H

## 2023-10-31 NOTE — NURSING
PT'S CONTINUOUS IV FLUIDS DISCONTINUED TODAY.  PT REQUESTING CONTINUOUS IV FLUIDS.  ON CALL PROVIDER (SB) NOTIFIED BY THIS NURSE (RM).  ON CALL PROVIDER (SB) TO ENTER NEW ORDERS.

## 2023-10-31 NOTE — PLAN OF CARE
[facility]  Discharge Final Note    Primary Care Provider: Luis Madden DO    Expected Discharge Date: 10/31/2023    Future Appointments   Date Time Provider Department Center   11/2/2023 12:00 PM Vidya Lemus, PTA [facility] [place]   11/6/2023 11:00 AM Dorothy Basurto, PT [facility] [place]   11/7/2023 10:30 AM Luis Madden DO [facility]   11/8/2023  4:30 PM Vidya Lemus, PTA [facility] [place]   11/13/2023  2:30 PM Dorothy Basurto, PT [facility] [place]   11/16/2023 11:00 AM Vidya Lemus, PTA [facility] [place]   11/29/2023  9:45 AM Sharon Babb MD [facility] [place]     Pt discharged home with no services. CM attempted to schedule  PCP referral. No appointments available in the requested time,   will send message to clinic nurse to contact pt/family with appointmtent. Note put in AVS.    Gavino Navarro, RN,BSN      Final Discharge Note (most recent)       Final Note - 10/30/23 1101          Final Note    Assessment Type Discharge Planning Brief Assessment                     Important Message from Medicare             Contact Info       Luis Madden DO   Specialty: Family Medicine   Relationship: PCP - General    [address] [facility]  [address]  [place] [id number]   Phone: [phone]       Next Steps: Follow up in 1 week(s)    Instructions: The Clinic Nurse will contact you with an appointment. If you do not hear from them in 48 hrs, please call [phone]

## 2023-10-31 NOTE — TELEPHONE ENCOUNTER
----- Message from Erik Watts sent at 10/31/2023 10:55 AM CDT -----  Contact: Gavino Navarro  Type:  Patient Call          Who Called: [facility] - Gavino Navarro         Does the patient know what this is regarding?: Requesting a call back to have a appt scheduled within 7 days from a hospital discharge on today ; please advise           Would the patient rather a call back or a response via [facility]?call           Best Call Back Number:[phone]             Additional Information:

## 2023-10-31 NOTE — HOSPITAL COURSE
Patient reports improvement in SOB and lightheadedness. Electrolytes repleted. For symptomatic palpitations consulted EP. Follows with EP Dr Monsalve as OP. Suspect likely sec to dehydration iso of increased stool output after Wellbutrin use. Advised keeping well hydrated and continue to take nadolol (brought from home) as not available at IP pharmacy. Patient reports more stool output 8 -10 liquid BM since starting Wellbutrin. Which probably led to electrolyte disturbance. Discussed with her OP psych PA, Karla Garcia PA-C  who saw patient recently. Agree with d/c wellbutrin and switch back to Remeron 30mg QHS. Prn Imodium for loose stools.   On 10/31, patient reports resolution of all symptoms she presented with including palpations. Patient is currently medically and HDS. She is being d/c home. Fu with PCP. Plan of care discussed with patient, verbalized understanding. All questions were answered.

## 2023-11-01 ENCOUNTER — PATIENT MESSAGE (OUTPATIENT)
Dept: REHABILITATION | Facility: HOSPITAL | Age: 43
End: 2023-11-01
Payer: COMMERCIAL

## 2023-11-01 RX ORDER — PROMETHAZINE HYDROCHLORIDE 25 MG/1
TABLET ORAL
Qty: 30 TABLET | Refills: 1 | Status: CANCELLED | OUTPATIENT
Start: 2023-11-01

## 2023-11-02 ENCOUNTER — CLINICAL SUPPORT (OUTPATIENT)
Dept: REHABILITATION | Facility: HOSPITAL | Age: 43
End: 2023-11-02
Payer: COMMERCIAL

## 2023-11-02 DIAGNOSIS — M25.612 DECREASED RANGE OF MOTION OF LEFT SHOULDER: Primary | ICD-10-CM

## 2023-11-02 PROCEDURE — 97110 THERAPEUTIC EXERCISES: CPT | Mod: CQ

## 2023-11-02 PROCEDURE — 97112 NEUROMUSCULAR REEDUCATION: CPT | Mod: CQ

## 2023-11-02 NOTE — PROGRESS NOTES
[facility]   Physical Therapy Treatment Note     Name: Ivy Salgado  Clinic Number: [id number]    Therapy Diagnosis:   Encounter Diagnosis   Name Primary?    Decreased range of motion of left shoulder Yes     Physician: Luis Madden DO    Visit Date: 11/2/2023       Evaluation Date: 8/8/2023  Authorization Period Expiration: 7/6/2024  Plan of Care Expiration: 12/31/2023  Progress Note Due: 9/10/2023  Visit # / Visits authorized: 24/ 40  Foto: 1/1      Time In: 10:00  Time Out: 11:00  Total Billable Time: 37 minutes  DOS: 7/18/2023  S/p: left  1. Total shoulder arthroplasty (complex, -22 modifier)  2. Shoulder lysis of adhesions  3. Shoulder subpectoral biceps tenodesis (CPT 57255)  Post-Op Precautions: We will follow the shoulder arthroplasty guidelines. The patient remained in a sling for 6 weeks. We will start PT at the 3-week martina.         Precautions: none    SUBJECTIVE     Pt reports: no new complaints. Will see tomorrow if they change job positions until L shoulder gets stronger    She was compliant with home exercise program.  Response to previous treatment: improvement in pain at rest   Functional change: able to josefa being out of sling for a little bit     Pain: 3/10  Location: left shoulder      OBJECTIVE     Shoulder Flexion AROM: 100 ; ER AROM to 20  Shoulder PROM Flexion 155 after manual    Treatment       Ivy received the treatments listed below:      Therapeutic exercises to develop ROM for 15 minutes including:  UBE 5/5 lvl 4  Slot Machine 40x -np  Pulleys 5m     Manual therapy techniques: Joint mobilizations and Soft tissue Mobilization were applied to the: L shoulder for 10 minutes, including:  Skilled PROM of L shoulder within tolerance and protocol in flexion/ER/IR, elbow flex/ext, wrist sup/pronation  Grade II mobs GHJ   METs for post/ant cuff activation   Rhythmic stab FL/ext and IR/ER-np    Neuromuscular re-education activities to improve: motor control  for 35 min  Resisted ER walkouts YTB 2x15  Resisted IR otb 2x15   L UE flexion slight elevation 3x10 1# (pain fee range)  Standing scaption 4x8  ABCs on the wall 3x NPT  SL shoulder abd #2 4x6      Patient Education and Home Exercises     Home Exercises Provided and Patient Education Provided     Education provided:   - Add new exercises     Written Home Exercises Provided: yes. Exercises were reviewed and Ivy was able to demonstrate them prior to the end of the session.  Ivy demonstrated good  understanding of the education provided. See EMR under Patient Instructions for exercises provided during therapy sessions    ASSESSMENT   No complaint of nerve pain today. Progress scapular and deltoid strengthening today. Cont to show weak RC strength with walkouts.Pt will hopefully be put on lighter duty tomorrow at work so that she is not over working L UE.     Ivy Is progressing well towards her goals.     Pt prognosis is Fair.     Pt will continue to benefit from skilled outpatient physical therapy to address the deficits listed in the problem list box on initial evaluation, provide pt/family education and to maximize pt's level of independence in the home and community environment.     Pt's spiritual, cultural and educational needs considered and pt agreeable to plan of care and goals.     Anticipated barriers to physical therapy: none     Goals:   Short Term Goals: 12 weeks  1. Pt will be compliant with HEP 50% of prescribed amount.   2. The pt to demo improvement in R shoulder PROM to by 80% of the L  3.  The pt to demo tolerance to being out of the sling for 24 hours with pain <2/10     Long Term Goals:  36 weeks   Pt will be compliant with % of prescribed amount.   The pt to improvement in AROM of L shoulder within 80% of R shoulder to improve tolerance to OH movement   The pt to  Demo at least 4+/5 of RTC muscle testing to demo improvement in tolerance to activity  The pt to tolerate lifting 50#  from the ground to waist height per job requirement description   The pt will report full participation in ADLs and IADLs without restrictions related to L Shoulder.      PLAN     Follow TSA procedure     Vidya Lemus PTA,

## 2023-11-03 RX ORDER — PROMETHAZINE HYDROCHLORIDE 25 MG/1
TABLET ORAL
Qty: 30 TABLET | Refills: 1 | Status: CANCELLED | OUTPATIENT
Start: 2023-11-01

## 2023-11-06 ENCOUNTER — CLINICAL SUPPORT (OUTPATIENT)
Dept: REHABILITATION | Facility: HOSPITAL | Age: 43
End: 2023-11-06
Payer: COMMERCIAL

## 2023-11-06 ENCOUNTER — OUTPATIENT CASE MANAGEMENT (OUTPATIENT)
Dept: ADMINISTRATIVE | Facility: OTHER | Age: 43
End: 2023-11-06
Payer: COMMERCIAL

## 2023-11-06 ENCOUNTER — PATIENT MESSAGE (OUTPATIENT)
Dept: REHABILITATION | Facility: HOSPITAL | Age: 43
End: 2023-11-06

## 2023-11-06 ENCOUNTER — PATIENT MESSAGE (OUTPATIENT)
Dept: SPORTS MEDICINE | Facility: CLINIC | Age: 43
End: 2023-11-06
Payer: COMMERCIAL

## 2023-11-06 DIAGNOSIS — M25.612 DECREASED RANGE OF MOTION OF LEFT SHOULDER: Primary | ICD-10-CM

## 2023-11-06 DIAGNOSIS — M25.512 CHRONIC LEFT SHOULDER PAIN: ICD-10-CM

## 2023-11-06 DIAGNOSIS — G89.29 CHRONIC LEFT SHOULDER PAIN: ICD-10-CM

## 2023-11-06 DIAGNOSIS — Z98.890 S/P SHOULDER SURGERY: ICD-10-CM

## 2023-11-06 PROCEDURE — 97110 THERAPEUTIC EXERCISES: CPT

## 2023-11-06 PROCEDURE — 97112 NEUROMUSCULAR REEDUCATION: CPT

## 2023-11-06 PROCEDURE — 97140 MANUAL THERAPY 1/> REGIONS: CPT

## 2023-11-06 RX ORDER — PROMETHAZINE HYDROCHLORIDE 25 MG/1
TABLET ORAL
Qty: 30 TABLET | Refills: 1 | Status: ON HOLD | OUTPATIENT
Start: 2023-11-06 | End: 2024-01-07 | Stop reason: HOSPADM

## 2023-11-06 RX ORDER — HYDROCODONE BITARTRATE AND ACETAMINOPHEN 10; 325 MG/1; MG/1
1 TABLET ORAL
Qty: 7 TABLET | Refills: 0 | Status: SHIPPED | OUTPATIENT
Start: 2023-11-06 | End: 2023-11-15 | Stop reason: ALTCHOICE

## 2023-11-06 NOTE — PROGRESS NOTES
Outpatient Care Management  Patient Does Not Consent    Patient: Ivy Salgado  MRN:  [id number]  Date of Service:  11/6/2023  Completed by:  Deedee Bray RN    Chief Complaint   Patient presents with    Case Closure       Patient Summary           Consent Received:  Decline  Decline Reason:  Needs met

## 2023-11-08 ENCOUNTER — CLINICAL SUPPORT (OUTPATIENT)
Dept: REHABILITATION | Facility: HOSPITAL | Age: 43
End: 2023-11-08
Payer: COMMERCIAL

## 2023-11-08 DIAGNOSIS — M25.612 DECREASED RANGE OF MOTION OF LEFT SHOULDER: Primary | ICD-10-CM

## 2023-11-08 PROCEDURE — 97110 THERAPEUTIC EXERCISES: CPT | Mod: CQ

## 2023-11-08 PROCEDURE — 97140 MANUAL THERAPY 1/> REGIONS: CPT | Mod: CQ

## 2023-11-08 PROCEDURE — 97112 NEUROMUSCULAR REEDUCATION: CPT | Mod: CQ

## 2023-11-08 NOTE — PROGRESS NOTES
[facility]   Physical Therapy Treatment Note     Name: Ivy Salgado  Clinic Number: [id number]    Therapy Diagnosis:   Encounter Diagnosis   Name Primary?    Decreased range of motion of left shoulder Yes     Physician: Luis Madden DO    Visit Date: 11/6/2023       Evaluation Date: 8/8/2023  Authorization Period Expiration: 7/6/2024  Plan of Care Expiration: 12/31/2023  Progress Note Due: 9/10/2023  Visit # / Visits authorized: 23/ 40  Foto: 1/1      Time In: 11:00  Time Out: 1200  Total Billable Time: 48 minutes  DOS: 7/18/2023  S/p: left  1. Total shoulder arthroplasty (complex, -22 modifier)  2. Shoulder lysis of adhesions  3. Shoulder subpectoral biceps tenodesis (CPT 70257)  Post-Op Precautions: We will follow the shoulder arthroplasty guidelines. The patient remained in a sling for 6 weeks. We will start PT at the 3-week martina.         Precautions: none    SUBJECTIVE     Pt reports: no new complaints, soreness in shld continues    She was compliant with home exercise program.  Response to previous treatment: improvement in pain at rest   Functional change: able to josefa being out of sling for a little bit     Pain: 3/10  Location: left shoulder      OBJECTIVE     Shoulder Flexion AROM: 110 ; ER AROM to 20  Shoulder PROM Flexion 160 after manual    Treatment       Ivy received the treatments listed below:      Therapeutic exercises to develop ROM for 15 minutes including:  UBE 5/5 lvl 4  Slot Machine 40x -np  Pulleys 5m     Manual therapy techniques: Joint mobilizations and Soft tissue Mobilization were applied to the: L shoulder for 10 minutes, including:  Skilled PROM of L shoulder within tolerance and protocol in flexion/ER/IR, elbow flex/ext, wrist sup/pronation  Grade II mobs GHJ   METs for post/ant cuff activation   Rhythmic stab FL/ext and IR/ER-np    Neuromuscular re-education activities to improve: motor control for 35 min  Resisted ER no band  2x15  Resisted IR otb  2x15   L UE flexion slight elevation 3x10 1# (pain fee range)  Standing scaption 4x8  ABCs on the wall 3x NPT  SL shoulder abd #2 4x6      Patient Education and Home Exercises     Home Exercises Provided and Patient Education Provided     Education provided:   - Add new exercises     Written Home Exercises Provided: yes. Exercises were reviewed and Ivy was able to demonstrate them prior to the end of the session.  Ivy demonstrated good  understanding of the education provided. See EMR under Patient Instructions for exercises provided during therapy sessions    ASSESSMENT   The patient able to complete all therex, noted improvement in flexion today. Weakness of external rotators noted but otherwise doing well.      Ivy Is progressing well towards her goals.     Pt prognosis is Fair.     Pt will continue to benefit from skilled outpatient physical therapy to address the deficits listed in the problem list box on initial evaluation, provide pt/family education and to maximize pt's level of independence in the home and community environment.     Pt's spiritual, cultural and educational needs considered and pt agreeable to plan of care and goals.     Anticipated barriers to physical therapy: none     Goals:   Short Term Goals: 12 weeks  1. Pt will be compliant with HEP 50% of prescribed amount.   2. The pt to demo improvement in R shoulder PROM to by 80% of the L  3.  The pt to demo tolerance to being out of the sling for 24 hours with pain <2/10     Long Term Goals:  36 weeks   Pt will be compliant with % of prescribed amount.   The pt to improvement in AROM of L shoulder within 80% of R shoulder to improve tolerance to OH movement   The pt to  Demo at least 4+/5 of RTC muscle testing to demo improvement in tolerance to activity  The pt to tolerate lifting 50# from the ground to waist height per job requirement description   The pt will report full participation in ADLs and IADLs without restrictions  related to L Shoulder.      PLAN     Follow TSA procedure     Dortohy Basurto PT,

## 2023-11-08 NOTE — PROGRESS NOTES
[facility]   Physical Therapy Treatment Note     Name: Ivy Salgado  Clinic Number: [id number]    Therapy Diagnosis:   No diagnosis found.    Physician: Luis Madden DO    Visit Date: 11/8/2023       Evaluation Date: 8/8/2023  Authorization Period Expiration: 7/6/2024  Plan of Care Expiration: 12/31/2023  Progress Note Due: 9/10/2023  Visit # / Visits authorized: 24/ 40  Foto: 1/1      Time In: 4:20 p  Time Out: 5:20 p   Total Billable Time: 60 minutes (PT tech extender used this session)  DOS: 7/18/2023  S/p: left  1. Total shoulder arthroplasty (complex, -22 modifier)  2. Shoulder lysis of adhesions  3. Shoulder subpectoral biceps tenodesis (CPT 25284)  Post-Op Precautions: We will follow the shoulder arthroplasty guidelines. The patient remained in a sling for 6 weeks. We will start PT at the 3-week martina.         Precautions: none    SUBJECTIVE     Pt reports: no new complaints, soreness in shld continues    She was compliant with home exercise program.  Response to previous treatment: improvement in pain at rest   Functional change: able to josefa being out of sling for a little bit     Pain: 3/10  Location: left shoulder      OBJECTIVE     Shoulder Flexion AROM: 110 ; ER AROM to 20  Shoulder PROM Flexion 160 after manual    Treatment       Ivy received the treatments listed below:      Therapeutic exercises to develop ROM for 15 minutes including:  UBE 5/5 lvl 4  Slot Machine 40x -np  Pulleys 5m     Manual therapy techniques: Joint mobilizations and Soft tissue Mobilization were applied to the: L shoulder for 10 minutes, including:  Skilled PROM of L shoulder within tolerance and protocol in flexion/ER/IR, elbow flex/ext, wrist sup/pronation  Grade II mobs GHJ   METs for post/ant cuff activation       Neuromuscular re-education activities to improve: motor control for 35 min  Resisted ER no band  2x15  Resisted IR otb 2x15   L UE flexion slight elevation 3x10 1# (pain fee  range)  Standing scaption 4x8  ABCs on the wall 2x #1.5 cuff wt on wrist  SL shoulder abd #2 4x6      Patient Education and Home Exercises     Home Exercises Provided and Patient Education Provided     Education provided:   - Add new exercises     Written Home Exercises Provided: yes. Exercises were reviewed and Ivy was able to demonstrate them prior to the end of the session.  Ivy demonstrated good  understanding of the education provided. See EMR under Patient Instructions for exercises provided during therapy sessions    ASSESSMENT   Ivy cont to show shoulder weakness with RC strength being the most limited for FE. Strength is progressing slowly. Pt would benefit to cont therapy to work on shoulder and scapular strength to be able to perform work duties without pain.     Ivy Is progressing well towards her goals.     Pt prognosis is Fair.     Pt will continue to benefit from skilled outpatient physical therapy to address the deficits listed in the problem list box on initial evaluation, provide pt/family education and to maximize pt's level of independence in the home and community environment.     Pt's spiritual, cultural and educational needs considered and pt agreeable to plan of care and goals.     Anticipated barriers to physical therapy: none     Goals:   Short Term Goals: 12 weeks  1. Pt will be compliant with HEP 50% of prescribed amount.   2. The pt to demo improvement in R shoulder PROM to by 80% of the L  3.  The pt to demo tolerance to being out of the sling for 24 hours with pain <2/10     Long Term Goals:  36 weeks   Pt will be compliant with % of prescribed amount.   The pt to improvement in AROM of L shoulder within 80% of R shoulder to improve tolerance to OH movement   The pt to  Demo at least 4+/5 of RTC muscle testing to demo improvement in tolerance to activity  The pt to tolerate lifting 50# from the ground to waist height per job requirement description   The pt will  report full participation in ADLs and IADLs without restrictions related to L Shoulder.      PLAN     Follow TSA procedure     Vidya Lemus PTA,

## 2023-11-12 ENCOUNTER — PATIENT MESSAGE (OUTPATIENT)
Dept: REHABILITATION | Facility: HOSPITAL | Age: 43
End: 2023-11-12
Payer: COMMERCIAL

## 2023-11-13 ENCOUNTER — CLINICAL SUPPORT (OUTPATIENT)
Dept: REHABILITATION | Facility: HOSPITAL | Age: 43
End: 2023-11-13
Payer: COMMERCIAL

## 2023-11-13 DIAGNOSIS — M25.612 DECREASED RANGE OF MOTION OF LEFT SHOULDER: Primary | ICD-10-CM

## 2023-11-13 PROCEDURE — 97110 THERAPEUTIC EXERCISES: CPT

## 2023-11-13 PROCEDURE — 97112 NEUROMUSCULAR REEDUCATION: CPT

## 2023-11-13 PROCEDURE — 97140 MANUAL THERAPY 1/> REGIONS: CPT

## 2023-11-13 NOTE — PROGRESS NOTES
[facility]   Physical Therapy Treatment Note     Name: Ivy Salgado  Clinic Number: [id number]    Therapy Diagnosis:   Encounter Diagnosis   Name Primary?    Decreased range of motion of left shoulder Yes       Physician: Luis Madden DO    Visit Date: 11/13/2023       Evaluation Date: 8/8/2023  Authorization Period Expiration: 7/6/2024  Plan of Care Expiration: 12/31/2023  Progress Note Due: 9/10/2023  Visit # / Visits authorized: 24/ 40  Foto: 1/1      Time In: 3:10 p  Time Out: 4:00 p   Total Billable Time: 50 minutes (PT tech extender used this session)  DOS: 7/18/2023  S/p: left  1. Total shoulder arthroplasty (complex, -22 modifier)  2. Shoulder lysis of adhesions  3. Shoulder subpectoral biceps tenodesis (CPT 27971)  Post-Op Precautions: We will follow the shoulder arthroplasty guidelines. The patient remained in a sling for 6 weeks. We will start PT at the 3-week martina.         Precautions: none    SUBJECTIVE     Pt reports: no new complaints, soreness in shld continues    She was compliant with home exercise program.  Response to previous treatment: improvement in pain at rest   Functional change: able to josefa being out of sling for a little bit     Pain: 3/10  Location: left shoulder      OBJECTIVE     Shoulder Flexion AROM: 110 ; ER AROM to 20  Shoulder PROM Flexion 160 after manual    Treatment       Ivy received the treatments listed below:      Therapeutic exercises to develop ROM for 08 minutes including:  UBE 4m/4m lvl 4 unilteral for endurance     Manual therapy techniques: Joint mobilizations and Soft tissue Mobilization were applied to the: L shoulder for 10 minutes, including:  Skilled PROM of L shoulder within tolerance and protocol in flexion/ER/IR, elbow flex/ext, wrist sup/pronation  Grade II mobs GHJ   METs for post/ant cuff activation       Neuromuscular re-education activities to improve: motor control for 32 min  Resisted ER walk outs x20 ytb  Resisted  IR walk outs otb 3x20  Supine Robots with 1/2 FR 20x 10s holds   SL shoulder abd #1 4x6      Patient Education and Home Exercises     Home Exercises Provided and Patient Education Provided     Education provided:   - Add new exercises     Written Home Exercises Provided: yes. Exercises were reviewed and Ivy was able to demonstrate them prior to the end of the session.  Ivy demonstrated good  understanding of the education provided. See EMR under Patient Instructions for exercises provided during therapy sessions    ASSESSMENT   The patient able to complete all therex, responded well to self lat mobilization technique with supine robots. The patient with improvement in AROM ER today and so progressed resistance band. Cont limitation in ER and flexion but this is to be expected following this surgery at this time and will cont to benefit from skilled PT to ensure proper recovery and tolerance to ADLs and IADLs    Ivy Is progressing well towards her goals.     Pt prognosis is Fair.     Pt will continue to benefit from skilled outpatient physical therapy to address the deficits listed in the problem list box on initial evaluation, provide pt/family education and to maximize pt's level of independence in the home and community environment.     Pt's spiritual, cultural and educational needs considered and pt agreeable to plan of care and goals.     Anticipated barriers to physical therapy: none     Goals:   Short Term Goals: 12 weeks  1. Pt will be compliant with HEP 50% of prescribed amount.   2. The pt to demo improvement in R shoulder PROM to by 80% of the L  3.  The pt to demo tolerance to being out of the sling for 24 hours with pain <2/10     Long Term Goals:  36 weeks   Pt will be compliant with % of prescribed amount.   The pt to improvement in AROM of L shoulder within 80% of R shoulder to improve tolerance to OH movement   The pt to  Demo at least 4+/5 of RTC muscle testing to demo improvement in  tolerance to activity  The pt to tolerate lifting 50# from the ground to waist height per job requirement description   The pt will report full participation in ADLs and IADLs without restrictions related to L Shoulder.      PLAN     Follow TSA procedure     Dorothy Basurto, PT,

## 2023-11-15 ENCOUNTER — PATIENT MESSAGE (OUTPATIENT)
Dept: SPORTS MEDICINE | Facility: CLINIC | Age: 43
End: 2023-11-15
Payer: COMMERCIAL

## 2023-11-15 DIAGNOSIS — G89.29 CHRONIC LEFT SHOULDER PAIN: ICD-10-CM

## 2023-11-15 DIAGNOSIS — Z98.890 S/P SHOULDER SURGERY: ICD-10-CM

## 2023-11-15 DIAGNOSIS — F41.9 ANXIETY: ICD-10-CM

## 2023-11-15 DIAGNOSIS — M25.512 CHRONIC LEFT SHOULDER PAIN: ICD-10-CM

## 2023-11-15 RX ORDER — CYCLOBENZAPRINE HCL 10 MG
10 TABLET ORAL NIGHTLY
Qty: 15 TABLET | Refills: 0 | Status: SHIPPED | OUTPATIENT
Start: 2023-11-15 | End: 2023-12-06 | Stop reason: SDUPTHER

## 2023-11-15 RX ORDER — ZOLPIDEM TARTRATE 10 MG/1
10 TABLET ORAL NIGHTLY
Qty: 30 TABLET | Refills: 2 | Status: SHIPPED | OUTPATIENT
Start: 2023-11-15 | End: 2024-02-15 | Stop reason: SDUPTHER

## 2023-11-15 RX ORDER — HYDROCODONE BITARTRATE AND ACETAMINOPHEN 10; 325 MG/1; MG/1
1 TABLET ORAL
Qty: 7 TABLET | Refills: 0 | Status: CANCELLED | OUTPATIENT
Start: 2023-11-15

## 2023-11-15 RX ORDER — HYDROCODONE BITARTRATE AND ACETAMINOPHEN 7.5; 325 MG/1; MG/1
1 TABLET ORAL
Qty: 7 TABLET | Refills: 0 | Status: SHIPPED | OUTPATIENT
Start: 2023-11-15 | End: 2023-11-27 | Stop reason: SDUPTHER

## 2023-11-15 NOTE — TELEPHONE ENCOUNTER
No care due was identified.  [facility] Embedded Care Due Messages. Reference number: [id number].   11/15/2023 6:23:46 AM CST

## 2023-11-16 ENCOUNTER — CLINICAL SUPPORT (OUTPATIENT)
Dept: REHABILITATION | Facility: HOSPITAL | Age: 43
End: 2023-11-16
Payer: COMMERCIAL

## 2023-11-16 DIAGNOSIS — M25.612 DECREASED RANGE OF MOTION OF LEFT SHOULDER: Primary | ICD-10-CM

## 2023-11-16 PROCEDURE — 97112 NEUROMUSCULAR REEDUCATION: CPT | Mod: CQ

## 2023-11-16 PROCEDURE — 97140 MANUAL THERAPY 1/> REGIONS: CPT | Mod: CQ

## 2023-11-16 PROCEDURE — 97110 THERAPEUTIC EXERCISES: CPT | Mod: CQ

## 2023-11-16 NOTE — PROGRESS NOTES
[facility]   Physical Therapy Treatment Note     Name: Ivy Salgado  Clinic Number: [id number]    Therapy Diagnosis:   No diagnosis found.      Physician: Luis Madden DO    Visit Date: 11/16/2023       Evaluation Date: 8/8/2023  Authorization Period Expiration: 7/6/2024  Plan of Care Expiration: 12/31/2023  Progress Note Due: 9/10/2023  Visit # / Visits authorized: 26/ 40  Foto: 1/1      Time In: 11:00 a  Time Out: 11:55 a   Total Billable Time: 60 minutes   DOS: 7/18/2023  S/p: left  1. Total shoulder arthroplasty (complex, -22 modifier)  2. Shoulder lysis of adhesions  3. Shoulder subpectoral biceps tenodesis (CPT 99822)  Post-Op Precautions: We will follow the shoulder arthroplasty guidelines. The patient remained in a sling for 6 weeks. We will start PT at the 3-week martina.         Precautions: none    SUBJECTIVE     Pt reports: Didnt sleep well last night. Slept on my shoulder wrong    She was compliant with home exercise program.  Response to previous treatment: improvement in pain at rest   Functional change: able to josefa being out of sling for a little bit     Pain: 3/10  Location: left shoulder      OBJECTIVE     Shoulder Flexion AROM: 110 ; ER AROM to 20  Shoulder PROM Flexion 160 after manual    Treatment       Ivy received the treatments listed below:      Therapeutic exercises to develop ROM for 08 minutes including:  UBE 4m/4m lvl 4 unilteral for endurance     Manual therapy techniques: Joint mobilizations and Soft tissue Mobilization were applied to the: L shoulder for 10 minutes, including:  Skilled PROM of L shoulder within tolerance and protocol in flexion/ER/IR, elbow flex/ext, wrist sup/pronation  Grade II mobs J   METs for post/ant cuff activation       Neuromuscular re-education activities to improve: motor control for 37 min  Resisted ER walk outs x20 ytb  Resisted IR walk outs otb 3x20  Supine Robots with 1/2 FR 20x 10s holds   SL shoulder abd #1  4x6      Patient Education and Home Exercises     Home Exercises Provided and Patient Education Provided     Education provided:   - Add new exercises     Written Home Exercises Provided: yes. Exercises were reviewed and Ivy was able to demonstrate them prior to the end of the session.  Ivy demonstrated good  understanding of the education provided. See EMR under Patient Instructions for exercises provided during therapy sessions    ASSESSMENT   FL PROM shows improvement this session. ER strength is slowly improving. Pt is progressing well but would benefit to cont skilled physical therapy to improve remaining deficits.     Ivy Is progressing well towards her goals.     Pt prognosis is Fair.     Pt will continue to benefit from skilled outpatient physical therapy to address the deficits listed in the problem list box on initial evaluation, provide pt/family education and to maximize pt's level of independence in the home and community environment.     Pt's spiritual, cultural and educational needs considered and pt agreeable to plan of care and goals.     Anticipated barriers to physical therapy: none     Goals:   Short Term Goals: 12 weeks  1. Pt will be compliant with HEP 50% of prescribed amount.   2. The pt to demo improvement in R shoulder PROM to by 80% of the L  3.  The pt to demo tolerance to being out of the sling for 24 hours with pain <2/10     Long Term Goals:  36 weeks   Pt will be compliant with % of prescribed amount.   The pt to improvement in AROM of L shoulder within 80% of R shoulder to improve tolerance to OH movement   The pt to  Demo at least 4+/5 of RTC muscle testing to demo improvement in tolerance to activity  The pt to tolerate lifting 50# from the ground to waist height per job requirement description   The pt will report full participation in ADLs and IADLs without restrictions related to L Shoulder.      PLAN     Follow TSA procedure     Vidya Lemus PTA,

## 2023-11-20 ENCOUNTER — CLINICAL SUPPORT (OUTPATIENT)
Dept: REHABILITATION | Facility: HOSPITAL | Age: 43
End: 2023-11-20
Payer: COMMERCIAL

## 2023-11-20 DIAGNOSIS — M25.612 DECREASED RANGE OF MOTION OF LEFT SHOULDER: Primary | ICD-10-CM

## 2023-11-20 PROCEDURE — 97112 NEUROMUSCULAR REEDUCATION: CPT

## 2023-11-20 PROCEDURE — 97140 MANUAL THERAPY 1/> REGIONS: CPT

## 2023-11-20 PROCEDURE — 97110 THERAPEUTIC EXERCISES: CPT

## 2023-11-20 NOTE — PROGRESS NOTES
[facility]   Physical Therapy Treatment Note     Name: Ivy Salgado  Clinic Number: [id number]    Therapy Diagnosis:   Encounter Diagnosis   Name Primary?    Decreased range of motion of left shoulder Yes     Physician: Luis Madden DO    Visit Date: 11/20/2023     Evaluation Date: 8/8/2023  Authorization Period Expiration: 7/6/2024  Plan of Care Expiration: 12/31/2023  Progress Note Due: 9/10/2023  Visit # / Visits authorized: 26/ 40  Foto: 1/1      Time In: 11:00 a  Time Out: 11:55 a   Total Billable Time: 60 minutes   DOS: 7/18/2023  S/p: left  1. Total shoulder arthroplasty (complex, -22 modifier)  2. Shoulder lysis of adhesions  3. Shoulder subpectoral biceps tenodesis (CPT 85845)  Post-Op Precautions: We will follow the shoulder arthroplasty guidelines. The patient remained in a sling for 6 weeks. We will start PT at the 3-week martina.         Precautions: none    SUBJECTIVE     Pt reports: She thinks she hurt her shoulder has pain down her am into her back elbow area. Notes pain in neck as well     She was compliant with home exercise program.  Response to previous treatment: improvement in pain at rest   Functional change: able to josefa being out of sling for a little bit     Pain: 3/10  Location: left shoulder      OBJECTIVE     Shoulder Flexion AROM: 110 ; ER AROM to 20  Shoulder PROM Flexion 160 after manual    Cervical extension 50% limited, pain down arm   Cervical rotation pain at end range   C7 hypermobility   T1-4 hypomobility     Treatment       Ivy received the treatments listed below:      Therapeutic exercises to develop ROM for 08 minutes including:  UBE 4m/4m lvl 4 unilteral for endurance  Thoracic ext over FR 3m   Thoracic rotations sidelying 15x B      Manual therapy techniques: Joint mobilizations and Soft tissue Mobilization were applied to the: L shoulder for 10 minutes, including:  Skilled PROM of L shoulder within tolerance and protocol in  flexion/ER/IR, elbow flex/ext, wrist sup/pronation  Grade II mobs GHJ   METs for post/ant cuff activation       Neuromuscular re-education activities to improve: motor control for 37 min  Robots on wall 3x5   Shld table walk outs for flex 15x   Sidelying ER 20x 5s holds         Patient Education and Home Exercises     Home Exercises Provided and Patient Education Provided     Education provided:   - Add new exercises     Written Home Exercises Provided: yes. Exercises were reviewed and Ivy was able to demonstrate them prior to the end of the session.  Ivy demonstrated good  understanding of the education provided. See EMR under Patient Instructions for exercises provided during therapy sessions    ASSESSMENT   The patient with increased pain today and onset of cervical n root irritation from C7 hypermobility likely due to sleeping position. The patient responded well to manual therapy to improve thoracic spine mobility and opening mobs at C7. The patient therex shifted focus to include thoracic mobility work. Cont to progress as tolerated.     Ivy Is progressing well towards her goals.     Pt prognosis is Fair.     Pt will continue to benefit from skilled outpatient physical therapy to address the deficits listed in the problem list box on initial evaluation, provide pt/family education and to maximize pt's level of independence in the home and community environment.     Pt's spiritual, cultural and educational needs considered and pt agreeable to plan of care and goals.     Anticipated barriers to physical therapy: none     Goals:   Short Term Goals: 12 weeks  1. Pt will be compliant with HEP 50% of prescribed amount.   2. The pt to demo improvement in R shoulder PROM to by 80% of the L  3.  The pt to demo tolerance to being out of the sling for 24 hours with pain <2/10     Long Term Goals:  36 weeks   Pt will be compliant with % of prescribed amount.   The pt to improvement in AROM of L shoulder  within 80% of R shoulder to improve tolerance to OH movement   The pt to  Demo at least 4+/5 of RTC muscle testing to demo improvement in tolerance to activity  The pt to tolerate lifting 50# from the ground to waist height per job requirement description   The pt will report full participation in ADLs and IADLs without restrictions related to L Shoulder.      PLAN     Follow TSA procedure     Dorothy Basurto, PT,

## 2023-11-24 ENCOUNTER — PATIENT MESSAGE (OUTPATIENT)
Dept: GASTROENTEROLOGY | Facility: CLINIC | Age: 43
End: 2023-11-24
Payer: COMMERCIAL

## 2023-11-27 ENCOUNTER — CLINICAL SUPPORT (OUTPATIENT)
Dept: REHABILITATION | Facility: HOSPITAL | Age: 43
End: 2023-11-27
Payer: COMMERCIAL

## 2023-11-27 DIAGNOSIS — M25.512 CHRONIC LEFT SHOULDER PAIN: ICD-10-CM

## 2023-11-27 DIAGNOSIS — G89.29 CHRONIC LEFT SHOULDER PAIN: ICD-10-CM

## 2023-11-27 DIAGNOSIS — Z98.890 S/P SHOULDER SURGERY: ICD-10-CM

## 2023-11-27 DIAGNOSIS — M25.612 DECREASED RANGE OF MOTION OF LEFT SHOULDER: Primary | ICD-10-CM

## 2023-11-27 PROCEDURE — 97112 NEUROMUSCULAR REEDUCATION: CPT

## 2023-11-27 PROCEDURE — 97110 THERAPEUTIC EXERCISES: CPT

## 2023-11-27 RX ORDER — HYDROCODONE BITARTRATE AND ACETAMINOPHEN 7.5; 325 MG/1; MG/1
1 TABLET ORAL
Qty: 7 TABLET | Refills: 0 | Status: SHIPPED | OUTPATIENT
Start: 2023-11-27 | End: 2023-12-06 | Stop reason: SDUPTHER

## 2023-11-27 NOTE — PROGRESS NOTES
[facility]   Physical Therapy Treatment Note     Name: Ivy Salgado  Clinic Number: [id number]    Therapy Diagnosis:   Encounter Diagnosis   Name Primary?    Decreased range of motion of left shoulder Yes     Physician: Luis Madden DO    Visit Date: 11/27/2023     Evaluation Date: 8/8/2023  Authorization Period Expiration: 7/6/2024  Plan of Care Expiration: 12/31/2023  Progress Note Due: 9/10/2023  Visit # / Visits authorized: 28/ 40  Foto: 1/1      Time In: 1400  Time Out: 1500   Total Billable Time: 60 minutes   DOS: 7/18/2023  S/p: left  1. Total shoulder arthroplasty (complex, -22 modifier)  2. Shoulder lysis of adhesions  3. Shoulder subpectoral biceps tenodesis (CPT 87914)  Post-Op Precautions: We will follow the shoulder arthroplasty guidelines. The patient remained in a sling for 6 weeks. We will start PT at the 3-week martina.         Precautions: none    SUBJECTIVE     Pt reports: she feels stuck, like she can't move her shoulder overhead     She was compliant with home exercise program.  Response to previous treatment: improvement in pain at rest   Functional change: able to josefa being out of sling for a little bit     Pain: 3/10  Location: left shoulder      OBJECTIVE     Shoulder Flexion AROM: 110 pre manual  130 post manual ; ER AROM to 20  Shoulder PROM Flexion 160 after manual      Treatment     Extender used during tx to A with care     Ivy received the treatments listed below:      Therapeutic exercises to develop ROM for 08 minutes including:  UBE 4m/4m lvl 4 unilteral for endurance    Manual therapy techniques: Joint mobilizations and Soft tissue Mobilization were applied to the: L shoulder for 10 minutes, including:  Skilled PROM of L shoulder within tolerance and protocol in flexion/ER/IR, elbow flex/ext, wrist sup/pronation  Grade II mobs GHJ   METs for post/ant cuff activation   Thoracic manip     Neuromuscular re-education activities to improve: motor  control for 37 min  Shld table walk outs for flex 15x   Sidelying ER 20x 5s holds   Slot machine with fwd lunge 4x10 2#  Wall slide 20x   Resisted internal rotation otb 3x15   Resisted ER in available range 3x15 ytb       Patient Education and Home Exercises     Home Exercises Provided and Patient Education Provided     Education provided:   - Add new exercises     Written Home Exercises Provided: yes. Exercises were reviewed and Ivy was able to demonstrate them prior to the end of the session.  Ivy demonstrated good  understanding of the education provided. See EMR under Patient Instructions for exercises provided during therapy sessions    ASSESSMENT     The patient with most significant improvement in ROM following thoracic manip, discussed risks of this with her diagnosis of low grade osteopenia however, those risks are very small and pt agreed to technique. The patient with improvement in 20 deg of shoulder flexion following the manipulation. Focused on lower trap activation today through therex.     Ivy Is progressing well towards her goals.     Pt prognosis is Fair.     Pt will continue to benefit from skilled outpatient physical therapy to address the deficits listed in the problem list box on initial evaluation, provide pt/family education and to maximize pt's level of independence in the home and community environment.     Pt's spiritual, cultural and educational needs considered and pt agreeable to plan of care and goals.     Anticipated barriers to physical therapy: none     Goals:   Short Term Goals: 12 weeks  1. Pt will be compliant with HEP 50% of prescribed amount.   2. The pt to demo improvement in R shoulder PROM to by 80% of the L  3.  The pt to demo tolerance to being out of the sling for 24 hours with pain <2/10     Long Term Goals:  36 weeks   Pt will be compliant with % of prescribed amount.   The pt to improvement in AROM of L shoulder within 80% of R shoulder to improve  tolerance to OH movement   The pt to  Demo at least 4+/5 of RTC muscle testing to demo improvement in tolerance to activity  The pt to tolerate lifting 50# from the ground to waist height per job requirement description   The pt will report full participation in ADLs and IADLs without restrictions related to L Shoulder.      PLAN     Follow TSA procedure     Dorothy Basurto, PT,

## 2023-12-01 ENCOUNTER — CLINICAL SUPPORT (OUTPATIENT)
Dept: REHABILITATION | Facility: HOSPITAL | Age: 43
End: 2023-12-01
Payer: COMMERCIAL

## 2023-12-01 DIAGNOSIS — M25.612 DECREASED RANGE OF MOTION OF LEFT SHOULDER: Primary | ICD-10-CM

## 2023-12-01 PROCEDURE — 97110 THERAPEUTIC EXERCISES: CPT | Mod: CQ

## 2023-12-01 PROCEDURE — 97112 NEUROMUSCULAR REEDUCATION: CPT | Mod: CQ

## 2023-12-01 NOTE — PROGRESS NOTES
[facility]   Physical Therapy Treatment Note     Name: Ivy Salgado  Clinic Number: [id number]    Therapy Diagnosis:   Encounter Diagnosis   Name Primary?    Decreased range of motion of left shoulder Yes     Physician: Luis Madden DO    Visit Date: 12/1/2023     Evaluation Date: 8/8/2023  Authorization Period Expiration: 7/6/2024  Plan of Care Expiration: 12/31/2023  Progress Note Due: 9/10/2023  Visit # / Visits authorized: 29/ 40  Foto: 1/1      Time In: 1010  Time Out: 1110  Total Billable Time: 50 minutes   DOS: 7/18/2023  S/p: left  1. Total shoulder arthroplasty (complex, -22 modifier)  2. Shoulder lysis of adhesions  3. Shoulder subpectoral biceps tenodesis (CPT 15917)  Post-Op Precautions: We will follow the shoulder arthroplasty guidelines. The patient remained in a sling for 6 weeks. We will start PT at the 3-week martina.         Precautions: none    SUBJECTIVE     Pt reports: Tired from working 13 days straight. No new c/o shoulder    She was compliant with home exercise program.  Response to previous treatment: improvement in pain at rest   Functional change: able to josefa being out of sling for a little bit     Pain: 3/10  Location: left shoulder      OBJECTIVE     Shoulder Flexion AROM: 110 pre manual  130 post manual ; ER AROM to 20  Shoulder PROM Flexion 160 after manual      Treatment       Ivy received the treatments listed below:      Therapeutic exercises to develop ROM for 08 minutes including:  UBE 4m/4m lvl 4 unilteral for endurance    Manual therapy techniques: Joint mobilizations and Soft tissue Mobilization were applied to the: L shoulder for 10 minutes, including:  Skilled PROM of L shoulder within tolerance and protocol in flexion/ER/IR, elbow flex/ext, wrist sup/pronation  Grade II mobs GHJ   METs for post/ant cuff activation   Thoracic manip (performed by PT Andrey Solis)    Neuromuscular re-education activities to improve: motor control for 42  min  Shld table walk outs for flex 15x   Sidelying ER 20x 5s holds   Slot machine with fwd lunge 4x10 2#  Wall slide 20x   Resisted internal rotation otb 3x15   Resisted ER in available range 3x15 ytb       Patient Education and Home Exercises     Home Exercises Provided and Patient Education Provided     Education provided:   - Add new exercises     Written Home Exercises Provided: yes. Exercises were reviewed and Ivy was able to demonstrate them prior to the end of the session.  Ivy demonstrated good  understanding of the education provided. See EMR under Patient Instructions for exercises provided during therapy sessions    ASSESSMENT     Improvement in AROM FL after T/S manipulation. Improved LT activation with shoulder FE. ER weakness still present.     Ivy Is progressing well towards her goals.     Pt prognosis is Fair.     Pt will continue to benefit from skilled outpatient physical therapy to address the deficits listed in the problem list box on initial evaluation, provide pt/family education and to maximize pt's level of independence in the home and community environment.     Pt's spiritual, cultural and educational needs considered and pt agreeable to plan of care and goals.     Anticipated barriers to physical therapy: none     Goals:   Short Term Goals: 12 weeks  1. Pt will be compliant with HEP 50% of prescribed amount.   2. The pt to demo improvement in R shoulder PROM to by 80% of the L  3.  The pt to demo tolerance to being out of the sling for 24 hours with pain <2/10     Long Term Goals:  36 weeks   Pt will be compliant with % of prescribed amount.   The pt to improvement in AROM of L shoulder within 80% of R shoulder to improve tolerance to OH movement   The pt to  Demo at least 4+/5 of RTC muscle testing to demo improvement in tolerance to activity  The pt to tolerate lifting 50# from the ground to waist height per job requirement description   The pt will report full  participation in ADLs and IADLs without restrictions related to L Shoulder.      PLAN     Follow TSA procedure     Vidya Lemus PTA,

## 2023-12-03 DIAGNOSIS — Z71.89 COMPLEX CARE COORDINATION: ICD-10-CM

## 2023-12-04 ENCOUNTER — HOSPITAL ENCOUNTER (OUTPATIENT)
Dept: RADIOLOGY | Facility: HOSPITAL | Age: 43
Discharge: HOME | End: 2023-12-04
Attending: ORTHOPAEDIC SURGERY
Payer: COMMERCIAL

## 2023-12-04 ENCOUNTER — CLINICAL SUPPORT (OUTPATIENT)
Dept: REHABILITATION | Facility: HOSPITAL | Age: 43
End: 2023-12-04
Payer: COMMERCIAL

## 2023-12-04 ENCOUNTER — OFFICE VISIT (OUTPATIENT)
Dept: SPORTS MEDICINE | Facility: CLINIC | Age: 43
End: 2023-12-04
Payer: COMMERCIAL

## 2023-12-04 VITALS — BODY MASS INDEX: 17.9 KG/M2 | WEIGHT: 94.81 LBS | HEIGHT: 61 IN

## 2023-12-04 DIAGNOSIS — M87.019 AVASCULAR NECROSIS OF BONE OF SHOULDER: ICD-10-CM

## 2023-12-04 DIAGNOSIS — M25.512 LEFT SHOULDER PAIN, UNSPECIFIED CHRONICITY: Primary | ICD-10-CM

## 2023-12-04 DIAGNOSIS — M25.512 LEFT SHOULDER PAIN, UNSPECIFIED CHRONICITY: ICD-10-CM

## 2023-12-04 DIAGNOSIS — M75.22 BICEPS TENDONITIS ON LEFT: ICD-10-CM

## 2023-12-04 DIAGNOSIS — M25.612 DECREASED RANGE OF MOTION OF LEFT SHOULDER: Primary | ICD-10-CM

## 2023-12-04 DIAGNOSIS — Z98.890 S/P SHOULDER SURGERY: ICD-10-CM

## 2023-12-04 PROCEDURE — 97140 MANUAL THERAPY 1/> REGIONS: CPT | Mod: CQ

## 2023-12-04 PROCEDURE — 73030 X-RAY EXAM OF SHOULDER: CPT | Mod: 26,LT,, | Performed by: RADIOLOGY

## 2023-12-04 PROCEDURE — 3008F PR BODY MASS INDEX (BMI) DOCUMENTED: ICD-10-PCS | Mod: CPTII,S$GLB,, | Performed by: ORTHOPAEDIC SURGERY

## 2023-12-04 PROCEDURE — 99999 PR PBB SHADOW E&M-EST. PATIENT-LVL IV: ICD-10-PCS | Mod: PBBFAC,,, | Performed by: ORTHOPAEDIC SURGERY

## 2023-12-04 PROCEDURE — 73030 XR SHOULDER COMPLETE 2 OR MORE VIEWS LEFT: ICD-10-PCS | Mod: 26,LT,, | Performed by: RADIOLOGY

## 2023-12-04 PROCEDURE — 1159F MED LIST DOCD IN RCRD: CPT | Mod: CPTII,S$GLB,, | Performed by: ORTHOPAEDIC SURGERY

## 2023-12-04 PROCEDURE — 1159F PR MEDICATION LIST DOCUMENTED IN MEDICAL RECORD: ICD-10-PCS | Mod: CPTII,S$GLB,, | Performed by: ORTHOPAEDIC SURGERY

## 2023-12-04 PROCEDURE — 97112 NEUROMUSCULAR REEDUCATION: CPT | Mod: CQ

## 2023-12-04 PROCEDURE — 99214 PR OFFICE/OUTPT VISIT, EST, LEVL IV, 30-39 MIN: ICD-10-PCS | Mod: S$GLB,,, | Performed by: ORTHOPAEDIC SURGERY

## 2023-12-04 PROCEDURE — 73030 X-RAY EXAM OF SHOULDER: CPT | Mod: TC,LT

## 2023-12-04 PROCEDURE — 99999 PR PBB SHADOW E&M-EST. PATIENT-LVL IV: CPT | Mod: PBBFAC,,, | Performed by: ORTHOPAEDIC SURGERY

## 2023-12-04 PROCEDURE — 97110 THERAPEUTIC EXERCISES: CPT | Mod: CQ

## 2023-12-04 PROCEDURE — 3008F BODY MASS INDEX DOCD: CPT | Mod: CPTII,S$GLB,, | Performed by: ORTHOPAEDIC SURGERY

## 2023-12-04 PROCEDURE — 99214 OFFICE O/P EST MOD 30 MIN: CPT | Mod: S$GLB,,, | Performed by: ORTHOPAEDIC SURGERY

## 2023-12-04 NOTE — PROGRESS NOTES
Chief Complaint: left shoulder pain    HISTORY OF PRESENT ILLNESS:   Pt is here today for post-operative followup of the below listed surgery.  she is doing well.  We have reviewed her findings and discussed plan of care and future treatment options.                                 Recently hospitalized in October for shortness of breath after medication switch, this has since resolved. Reports intermittent numbness and tingling at the hand and wrist that has     Patient has been attending physical therapy at the [facility] location, working with Glory BAEZA and Vidya.  Pain today 4/10  SANE: 47      DATE OF PROCEDURE: 07/18/2023  SURGEON: Sharon Babb M.D.   PROCEDURE PERFORMED:   left  1. Total shoulder arthroplasty (complex, -22 modifier)  2. Shoulder lysis of adhesions  3. Shoulder subpectoral biceps tenodesis (CPT 73949)      PAST MEDICAL HISTORY:   Past Medical History:   Diagnosis Date    Abnormal Pap smear 2007    Abnormal Pap smear 5/26/2011    Anemia     Anxiety     Arthritis     C. difficile diarrhea     Crohn's disease     Depression 8/5/2017    Encounter for blood transfusion     Genital HSV     History of colposcopy with cervical biopsy 2007 and 7/2011 2007-LYLA I  and 7/2011- LYLA I    Hypertension     Kidney stone     Kidney stone     Melanoma     Recurrent UTI 4/3/2013    S/P ileostomy 7/9/2012    Sterilization 6/23/2012     PAST SURGICAL HISTORY:   Past Surgical History:   Procedure Laterality Date    ABDOMINAL SURGERY      APPENDECTOMY      ARTHROPLASTY OF SHOULDER Left 7/18/2023    Procedure: ARTHROPLASTY, SHOULDER;  Surgeon: Sharon Babb MD;  Location: [facility];  Service: Orthopedics;  Laterality: Left;    AUGMENTATION OF BREAST Bilateral 06/2022    gel implants    BILATERAL SALPINGO-OOPHORECTOMY (BSO) Bilateral 05/30/2019    Procedure: SALPINGO-OOPHORECTOMY, BILATERAL;  Surgeon: Rupa German MD;  Location: [address];  Service: OB/GYN;  Laterality: Bilateral;    BLADDER SURGERY       partial cystectomy due to fistula    breast lift      BREAST SURGERY      [facility]      COLON SURGERY      COLONOSCOPY      CYSTOSCOPY  09/23/2020    Procedure: CYSTOSCOPY;  Surgeon: Sascha Florentino MD;  [facility];  Service: Urology;;    CYSTOSCOPY W/ URETERAL STENT PLACEMENT Left 09/15/2020    Procedure: CYSTOSCOPY, WITH URETERAL STENT INSERTION;  Surgeon: Sascha Florentino MD;  [facility];  Service: Urology;  Laterality: Left;    DIAGNOSTIC LAPAROSCOPY N/A 07/09/2020    Procedure: LAPAROSCOPY, DIAGNOSTIC;  Surgeon: Gilson El MD;  Location: [facility] OR;  Service: OB/GYN;  Laterality: N/A;    EXCISION OF MELANOMA  07/17/2019    ILEOSTOMY      LAPAROSCOPIC LYSIS OF ADHESIONS N/A 07/09/2020    Procedure: LYSIS, ADHESIONS, LAPAROSCOPIC;  Surgeon: Gilson El MD;  Location: [facility] OR;  Service: OB/GYN;  Laterality: N/A;    LASER LITHOTRIPSY  09/23/2020    Procedure: LITHOTRIPSY, USING LASER;  Surgeon: Sascha Florentino MD;  Location: [facility] OR 1ST FLR;  Service: Urology;;    LYSIS OF ADHESIONS N/A 05/30/2019    Procedure: LYSIS, ADHESIONS;  Surgeon: Rupa German MD;  Location: [facility] OR 2ND FLR;  Service: OB/GYN;  Laterality: N/A;    OOPHORECTOMY Right 04/16/2015    PORTACATH PLACEMENT  02/21/2017    SKIN BIOPSY      SMALL INTESTINE SURGERY      age 18 Y    TOTAL ABDOMINAL HYSTERECTOMY  04/16/2015    TOTAL COLECTOMY      TUBAL LIGATION  06/06/2012    UPPER GASTROINTESTINAL ENDOSCOPY      URETEROSCOPIC REMOVAL OF URETERIC CALCULUS  09/23/2020    Procedure: REMOVAL, CALCULUS, URETER, URETEROSCOPIC;  Surgeon: Sascha Florentino MD;  [facility];  Service: Urology;;    URETEROSCOPY Left 09/23/2020    Procedure: URETEROSCOPY;  Surgeon: Sascha Florentino MD;  Location: [facility] OR [address] [place] [address];  Service: Urology;  Laterality: Left;     FAMILY HISTORY:   Family History   Problem Relation Age of Onset    Diabetes Paternal Grandfather     Hearing loss Paternal Grandmother      Cancer Maternal Grandfather         Skin    Skin cancer Maternal Grandfather     Diabetes Maternal Grandfather     Heart disease Maternal Grandfather     Colon cancer Father     Cancer Father         Colon    Liver cancer Father     Hyperlipidemia Father     Hypertension Mother     Crohn's disease Brother     Endometrial cancer Maternal Aunt     Breast cancer Maternal Cousin 41    Crohn's disease Daughter     Ovarian cancer Neg Hx     Melanoma Neg Hx      SOCIAL HISTORY:   Social History     Socioeconomic History    Marital status: Single   Occupational History     Employer: [employer]   Tobacco Use    Smoking status: Never    Smokeless tobacco: Never   Substance and Sexual Activity    Alcohol use: Not Currently     Alcohol/week: 0.0 standard drinks of alcohol    Drug use: No    Sexual activity: Yes     Partners: Male     Birth control/protection: See Surgical Hx     Comment: HYST   Other Topics Concern    Are you pregnant or think you may be? No    Breast-feeding No     Social Determinants of Health     Financial Resource Strain: High Risk (10/24/2023)    Overall Financial Resource Strain (CARDIA)     Difficulty of Paying Living Expenses: Hard   Food Insecurity: Food Insecurity Present (10/24/2023)    Hunger Vital Sign     Worried About Running Out of Food in the Last Year: Sometimes true     Ran Out of Food in the Last Year: Sometimes true   Transportation Needs: Unmet Transportation Needs (10/24/2023)    PRAPARE - Transportation     Lack of Transportation (Medical): Yes     Lack of Transportation (Non-Medical): Yes   Physical Activity: Insufficiently Active (10/24/2023)    Exercise Vital Sign     Days of Exercise per Week: 3 days     Minutes of Exercise per Session: 40 min   Stress: Stress Concern Present (10/24/2023)    [employer] [place] [employer] - Occupational Stress Questionnaire     Feeling of Stress : Rather much   Social Connections: Unknown (10/24/2023)    Social Connection  and Isolation Panel [NHANES]     Frequency of Communication with Friends and Family: More than three times a week     Frequency of Social Gatherings with Friends and Family: Once a week     Active Member of Clubs or Organizations: No     Attends Club or Organization Meetings: Patient refused     Marital Status:    Housing Stability: High Risk (10/24/2023)    Housing Stability Vital Sign     Unable to Pay for Housing in the Last Year: Yes     Number of Places Lived in the Last Year: 2     Unstable Housing in the Last Year: No       MEDICATIONS:   Current Outpatient Medications:     clonazePAM (KLONOPIN) 1 MG tablet, Take 1 tablet (1 mg total) by mouth 2 (two) times daily., Disp: 60 tablet, Rfl: 2    cyclobenzaprine (FLEXERIL) 10 MG tablet, Take 1 tablet (10 mg total) by mouth every evening as needed for muscle pain, Disp: 15 tablet, Rfl: 0    droNABinol (MARINOL) 5 MG capsule, Take 1 capsule (5 mg total) by mouth 2 (two) times daily before meals., Disp: 60 capsule, Rfl: 1    estradioL (ESTRACE) 0.01 % (0.1 mg/gram) vaginal cream, Place 1 g vaginally once daily., Disp: 30 g, Rfl: 12    flu vacc [id number] 6mos up,PF, (FLUARIX QUAD [id number], PF,) 60 mcg (15 mcg x 4)/0.5 mL Syrg, inject into muscle for one dose, Disp: 0.5 mL, Rfl: 0    fluconazole (DIFLUCAN) 150 MG Tab, Take 1 tablet (150 mg total) by mouth every 72 hours as needed (vaginal yeast)., Disp: 3 tablet, Rfl: 0    gabapentin (NEURONTIN) 300 MG capsule, Take 1 capsule (300 mg total) by mouth 2 (two) times daily., Disp: 60 capsule, Rfl: 11    HYDROcodone-acetaminophen (NORCO) 7.5-325 mg per tablet, Take 1 tablet by mouth every 24 hours as needed for Pain., Disp: 7 tablet, Rfl: 0    loperamide (IMODIUM) 2 mg capsule, Take 2 mg by mouth daily as needed for Diarrhea., Disp: , Rfl:     mirtazapine (REMERON SOL-TAB) 30 MG disintegrating tablet, Take 1 tablet (30 mg total) by mouth nightly., Disp: 30 tablet, Rfl: 0    multivitamin (THERAGRAN) per tablet, Take  1 tablet by mouth once daily., Disp: , Rfl:     nadoloL (CORGARD) 40 MG tablet, Take 1 tablet (40 mg total) by mouth once daily., Disp: 30 tablet, Rfl: 11    pantoprazole (PROTONIX) 40 MG tablet, Take 1 tablet (40 mg total) by mouth 2 (two) times daily., Disp: 60 tablet, Rfl: 12    promethazine (PHENERGAN) 25 MG tablet, TAKE 1 TABLET BY MOUTH EVERY 8 HOURS FOR NAUSEA, Disp: 30 tablet, Rfl: 1    tamsulosin (FLOMAX) 0.4 mg Cap, Take 1 capsule (0.4 mg total) by mouth once daily., Disp: 30 capsule, Rfl: 1    triamcinolone acetonide 0.1% (KENALOG) 0.1 % cream, Apply topically 2 (two) times daily., Disp: , Rfl:     valACYclovir (VALTREX) 500 MG tablet, Take 1 tablet (500 mg total) by mouth once daily., Disp: 90 tablet, Rfl: 3    vedolizumab (ENTYVIO) 300 mg SolR injection, Inject 300 mg into the vein., Disp: , Rfl:     zolpidem (AMBIEN) 10 mg Tab, Take 1 tablet (10 mg total) by mouth every evening., Disp: 30 tablet, Rfl: 2    Current Facility-Administered Medications:     estradiol valerate (DELESTROGEN) injection 20 mg/mL, 20 mg, Intramuscular, Q30 Days, Gilson El MD, 20 mg at 12/30/22 0902    estradiol valerate injection 20 mg, 20 mg, Intramuscular, Q30 Days, Gilson El MD, 20 mg at 05/11/23 1327  ALLERGIES:   Review of patient's allergies indicates:   Allergen Reactions    Azathioprine sodium Other (See Comments)     Other reaction(s): pancreatitis  Other reaction(s): pancreatitis    Methotrexate analogues Other (See Comments)     leukopenia    Stelara [ustekinumab] Other (See Comments)     Multiple infections    Zofran [ondansetron hcl (pf)] Other (See Comments)     Per patient causes prolong QT    Vancomycin analogues Other (See Comments)     Made her red    Azathioprine      Other reaction(s): Unknown    Methotrexate      Other reaction(s): infection-    Morphine Itching and Other (See Comments)     Other reaction(s): Itching    Zofran [ondansetron hcl]      Other reaction(s): Hives    Bactrim  "[sulfamethoxazole-trimethoprim] Palpitations    Ciprofloxacin Palpitations       VITAL SIGNS: Ht 5' 1" (1.549 m)   Wt 43 kg (94 lb 12.8 oz)   LMP 03/30/2015   BMI 17.91 kg/m²      Review of Systems   Constitution: Negative. Negative for chills, fever and night sweats.   HENT: Negative for congestion and headaches.    Eyes: Negative for blurred vision, left vision loss and right vision loss.   Cardiovascular: Negative for chest pain and syncope.   Respiratory: Negative for cough and shortness of breath.    Endocrine: Negative for polydipsia, polyphagia and polyuria.   Hematologic/Lymphatic: Negative for bleeding problem. Does not bruise/bleed easily.   Skin: Negative for dry skin, itching and rash.   Musculoskeletal: Negative for falls and muscle weakness.   Gastrointestinal: Negative for abdominal pain and bowel incontinence.   Genitourinary: Negative for bladder incontinence and nocturia.   Neurological: Negative for disturbances in coordination, loss of balance and seizures.   Psychiatric/Behavioral: Negative for depression. The patient does not have insomnia.    Allergic/Immunologic: Negative for hives and persistent infections.                                                     PHYSICAL EXAMINATION:     Incision sites healed well  No evidence of any erythema, infection or induration  elbow Range of motion full   No effusion  2+ DP pulse  No swelling    ROM:      Forward Elevation: 55  ER: 10  IR: L5      RADIOGRAPHS reviewed and interpreted by myself:  2 views.  Postoperative changes are again identified relating to a prior left shoulder arthroplasty procedure, prostheses appearing unremarkable.  No evidence of recent fracture, lytic destructive process, development of abnormal periprosthetic lucency, or other significant detrimental change since 08/11/2023 is appreciated.  Vascular access catheter with its tip in the superior vena cava again incidentally observed.                                             "                                ASSESSMENT:                                                                                                                                               1. Status post above, doing well.                                                                                                                               PLAN:                                                                                                                                                     1. Continue PT, case discussed with therapist.   2. Emphasized scapular function.  3. I have discussed return to activity in detail, patient will continue to be at [employer] with desk duties only. (Desk duties, no reaching above shoulder level, no pushing, no pulling, no lifting, and no carrying over 2-3 lbs)  4. she will see us back in 3 months.                                      5. All questions were answered, surgical technique was reviewed and interpreted, and patient should contact us with any questions or concerns in the interim

## 2023-12-06 DIAGNOSIS — M25.512 CHRONIC LEFT SHOULDER PAIN: ICD-10-CM

## 2023-12-06 DIAGNOSIS — G89.29 CHRONIC LEFT SHOULDER PAIN: ICD-10-CM

## 2023-12-06 DIAGNOSIS — Z98.890 S/P SHOULDER SURGERY: ICD-10-CM

## 2023-12-06 RX ORDER — CYCLOBENZAPRINE HCL 10 MG
10 TABLET ORAL NIGHTLY
Qty: 15 TABLET | Refills: 0 | Status: SHIPPED | OUTPATIENT
Start: 2023-12-06 | End: 2023-12-21 | Stop reason: SDUPTHER

## 2023-12-06 RX ORDER — HYDROCODONE BITARTRATE AND ACETAMINOPHEN 7.5; 325 MG/1; MG/1
1 TABLET ORAL
Qty: 7 TABLET | Refills: 0 | Status: SHIPPED | OUTPATIENT
Start: 2023-12-06 | End: 2023-12-14 | Stop reason: SDUPTHER

## 2023-12-07 ENCOUNTER — CLINICAL SUPPORT (OUTPATIENT)
Dept: REHABILITATION | Facility: HOSPITAL | Age: 43
End: 2023-12-07
Payer: COMMERCIAL

## 2023-12-07 DIAGNOSIS — M25.612 DECREASED RANGE OF MOTION OF LEFT SHOULDER: Primary | ICD-10-CM

## 2023-12-07 PROCEDURE — 97530 THERAPEUTIC ACTIVITIES: CPT

## 2023-12-07 PROCEDURE — 97140 MANUAL THERAPY 1/> REGIONS: CPT

## 2023-12-07 PROCEDURE — 97112 NEUROMUSCULAR REEDUCATION: CPT

## 2023-12-07 NOTE — PROGRESS NOTES
[facility]   Physical Therapy Treatment Note     Name: Ivy Salgado  Clinic Number: [id number]    Therapy Diagnosis:   Encounter Diagnosis   Name Primary?    Decreased range of motion of left shoulder Yes     Physician: Luis Madden DO    Visit Date: 12/7/2023     Evaluation Date: 8/8/2023  Authorization Period Expiration: 7/6/2024  Plan of Care Expiration: 12/31/2023  Progress Note Due: 9/10/2023  Visit # / Visits authorized: 32/ 40  Foto: 1/1      Time In: 1500  Time Out: 1600  Total Billable Time: 30 minutes   DOS: 7/18/2023  S/p: left  1. Total shoulder arthroplasty (complex, -22 modifier)  2. Shoulder lysis of adhesions  3. Shoulder subpectoral biceps tenodesis (CPT 91049)  Post-Op Precautions: We will follow the shoulder arthroplasty guidelines. The patient remained in a sling for 6 weeks. We will start PT at the 3-week martina.         Precautions: none    SUBJECTIVE     Pt reports: cont to have nerve pain into her hand     She was compliant with home exercise program.  Response to previous treatment: improvement in pain at rest   Functional change: able to josefa being out of sling for a little bit     Pain: 3/10  Location: left shoulder      OBJECTIVE     Cervical flexion * pain free   Cervical Ext *pain free hinge at mid cervical 100%   Cervical Rotation * pain free 80 deg   Cervical Rotation * pian free 80 deg   Cervical sidebend 60 deg *pain free   (No pain with cervical overpressure in any position)     (-) distraction   (-) spurling     (+) scap A elevation relieved sx     ANTT (+) radial nerve with distal movement but not proximal movement L           Treatment       Ivy received the treatments listed below:      Therapeutic exercises to develop ROM for 12 minutes including:  UBE 4m/4m lvl 3 unilteral for endurance  Thoracic rotations 15x B     Manual therapy techniques: Joint mobilizations and Soft tissue Mobilization were applied to the: L shoulder for 15 minutes,  including:  Skilled PROM of L shoulder within tolerance and protocol in flexion/ER/IR, elbow flex/ext, wrist sup/pronation  Grade II mobs GHJ   Pec stretch with scap mob sidelying MET  METs for post/ant cuff activation   UT Taping with kinesiotape   C6-7 L opening mobs with self radial n glides     Neuromuscular re-education activities to improve: motor control for 30 min  UT level 1 30x   Radial nerve glides standing   Shld flexion supine lat stretch 1# 3x15    Patient Education and Home Exercises     Home Exercises Provided and Patient Education Provided     Education provided:   - Add new exercises     Written Home Exercises Provided: yes. Exercises were reviewed and Ivy was able to demonstrate them prior to the end of the session.  Ivy demonstrated good  understanding of the education provided. See EMR under Patient Instructions for exercises provided during therapy sessions    ASSESSMENT     The patient with sig depression of her scapula compared to last visit with me. With a positive radial nerve testing but symptom provocation from increased shoulder movement and not neck as well as a negative CPR for cervical radiculopathy she likely has radiculopathy due to tractioning of her neck as symptoms improve with assisted flexion. Attempted taping for UT today and will continue should it provide relief or progress to leukotaping next visit should she require more intensive assistance for sx relief.     Ivy Is progressing well towards her goals.     Pt prognosis is Fair.     Pt will continue to benefit from skilled outpatient physical therapy to address the deficits listed in the problem list box on initial evaluation, provide pt/family education and to maximize pt's level of independence in the home and community environment.     Pt's spiritual, cultural and educational needs considered and pt agreeable to plan of care and goals.     Anticipated barriers to physical therapy: none     Goals:   Short Term  Goals: 12 weeks  1. Pt will be compliant with HEP 50% of prescribed amount.   2. The pt to demo improvement in R shoulder PROM to by 80% of the L  3.  The pt to demo tolerance to being out of the sling for 24 hours with pain <2/10     Long Term Goals:  36 weeks   Pt will be compliant with % of prescribed amount.   The pt to improvement in AROM of L shoulder within 80% of R shoulder to improve tolerance to OH movement   The pt to  Demo at least 4+/5 of RTC muscle testing to demo improvement in tolerance to activity  The pt to tolerate lifting 50# from the ground to waist height per job requirement description   The pt will report full participation in ADLs and IADLs without restrictions related to L Shoulder.      PLAN     Follow TSA procedure     Dorothy Basurto, PT,

## 2023-12-10 ENCOUNTER — PATIENT MESSAGE (OUTPATIENT)
Dept: UROLOGY | Facility: CLINIC | Age: 43
End: 2023-12-10
Payer: COMMERCIAL

## 2023-12-11 DIAGNOSIS — Z87.440 HISTORY OF RECURRENT UTI (URINARY TRACT INFECTION): Primary | ICD-10-CM

## 2023-12-12 ENCOUNTER — CLINICAL SUPPORT (OUTPATIENT)
Dept: REHABILITATION | Facility: HOSPITAL | Age: 43
End: 2023-12-12
Payer: COMMERCIAL

## 2023-12-12 ENCOUNTER — PATIENT MESSAGE (OUTPATIENT)
Dept: GASTROENTEROLOGY | Facility: CLINIC | Age: 43
End: 2023-12-12
Payer: COMMERCIAL

## 2023-12-12 DIAGNOSIS — M25.612 DECREASED RANGE OF MOTION OF LEFT SHOULDER: Primary | ICD-10-CM

## 2023-12-12 PROCEDURE — 97140 MANUAL THERAPY 1/> REGIONS: CPT

## 2023-12-12 PROCEDURE — 97112 NEUROMUSCULAR REEDUCATION: CPT

## 2023-12-12 PROCEDURE — 97110 THERAPEUTIC EXERCISES: CPT

## 2023-12-12 NOTE — TELEPHONE ENCOUNTER
Spoke with patient.   Aware [facility] paperwork has been faxed to the disability department.  Oralia

## 2023-12-12 NOTE — PROGRESS NOTES
[facility]   Physical Therapy Treatment Note     Name: Ivy Salgado  Clinic Number: [id number]    Therapy Diagnosis:   Encounter Diagnosis   Name Primary?    Decreased range of motion of left shoulder Yes     Physician: Luis Madden DO    Visit Date: 12/12/2023     Evaluation Date: 8/8/2023  Authorization Period Expiration: 7/6/2024  Plan of Care Expiration: 12/31/2023  Progress Note Due: 9/10/2023  Visit # / Visits authorized: 32/ 40  Foto: 2/3      Time In: 1000  Time Out: 1100  Total Billable Time: 28 minutes     DOS: 7/18/2023  S/p: left  1. Total shoulder arthroplasty (complex, -22 modifier)  2. Shoulder lysis of adhesions  3. Shoulder subpectoral biceps tenodesis (CPT 14094)  Post-Op Precautions: We will follow the shoulder arthroplasty guidelines. The patient remained in a sling for 6 weeks. We will start PT at the 3-week martina.       Precautions: none    SUBJECTIVE     Pt reports: the tape really helped and she has been having less pain but does have intermittent radiating pain into her hand    She was compliant with home exercise program.  Response to previous treatment: improvement in pain at rest   Functional change: able to josefa being out of sling for a little bit     Pain: 3/10  Location: left shoulder      OBJECTIVE     No pain with cervical ROM today   Shoulder Flexion Pre 120, post 130           Treatment       Ivy received the treatments listed below:      Therapeutic exercises to develop ROM for 12 minutes including:  UBE 4m/4m lvl 3 unilteral for endurance  Thoracic rotations 15x B     Manual therapy techniques: Joint mobilizations and Soft tissue Mobilization were applied to the: L shoulder for 08 minutes, including:  Skilled PROM of L shoulder within tolerance and protocol in flexion/ER/IR, elbow flex/ext, wrist sup/pronation  Grade II mobs GHJ   Pec stretch with scap mob sidelying MET  METs for post/ant cuff activation   UT Taping with kinesiotape   C6-7 L  opening mobs with self radial n glides     Neuromuscular re-education activities to improve: motor control for 30 min  UT level 1 30x   Wall slides 20x   Resisted ER 15x 5s holds otb   Resisted IR gtb 3x15  Scaption 4x8        Patient Education and Home Exercises     Home Exercises Provided and Patient Education Provided     Education provided:   - Add new exercises     Written Home Exercises Provided: yes. Exercises were reviewed and Ivy was able to demonstrate them prior to the end of the session.  Ivy demonstrated good  understanding of the education provided. See EMR under Patient Instructions for exercises provided during therapy sessions    ASSESSMENT     The ptient with sig improvement in passive ROM today compared to last visit with less neural sx overall. Noted trouble with scaption but good self correction with use of visual feedback. The patient will cont to benefit from kinesio taping for UT recruitment for the next week or so until her UT can function and all radiating sx have resolved.    Ivy Is progressing well towards her goals.     Pt prognosis is Fair.     Pt will continue to benefit from skilled outpatient physical therapy to address the deficits listed in the problem list box on initial evaluation, provide pt/family education and to maximize pt's level of independence in the home and community environment.     Pt's spiritual, cultural and educational needs considered and pt agreeable to plan of care and goals.     Anticipated barriers to physical therapy: none     Goals:   Short Term Goals: 12 weeks  1. Pt will be compliant with HEP 50% of prescribed amount.   2. The pt to demo improvement in R shoulder PROM to by 80% of the L  3.  The pt to demo tolerance to being out of the sling for 24 hours with pain <2/10     Long Term Goals:  36 weeks   Pt will be compliant with % of prescribed amount.   The pt to improvement in AROM of L shoulder within 80% of R shoulder to improve  tolerance to OH movement   The pt to  Demo at least 4+/5 of RTC muscle testing to demo improvement in tolerance to activity  The pt to tolerate lifting 50# from the ground to waist height per job requirement description   The pt will report full participation in ADLs and IADLs without restrictions related to L Shoulder.      PLAN     Follow TSA procedure     Dorothy Basurto, PT,

## 2023-12-14 ENCOUNTER — CLINICAL SUPPORT (OUTPATIENT)
Dept: REHABILITATION | Facility: HOSPITAL | Age: 43
End: 2023-12-14
Payer: COMMERCIAL

## 2023-12-14 ENCOUNTER — PATIENT MESSAGE (OUTPATIENT)
Dept: REHABILITATION | Facility: HOSPITAL | Age: 43
End: 2023-12-14

## 2023-12-14 DIAGNOSIS — M25.512 CHRONIC LEFT SHOULDER PAIN: ICD-10-CM

## 2023-12-14 DIAGNOSIS — G89.29 CHRONIC LEFT SHOULDER PAIN: ICD-10-CM

## 2023-12-14 DIAGNOSIS — Z98.890 S/P SHOULDER SURGERY: ICD-10-CM

## 2023-12-14 DIAGNOSIS — M25.612 DECREASED RANGE OF MOTION OF LEFT SHOULDER: Primary | ICD-10-CM

## 2023-12-14 PROCEDURE — 97112 NEUROMUSCULAR REEDUCATION: CPT

## 2023-12-14 PROCEDURE — 97110 THERAPEUTIC EXERCISES: CPT

## 2023-12-14 PROCEDURE — 97140 MANUAL THERAPY 1/> REGIONS: CPT

## 2023-12-14 RX ORDER — HYDROCODONE BITARTRATE AND ACETAMINOPHEN 7.5; 325 MG/1; MG/1
1 TABLET ORAL
Qty: 7 TABLET | Refills: 0 | Status: SHIPPED | OUTPATIENT
Start: 2023-12-14 | End: 2023-12-21 | Stop reason: SDUPTHER

## 2023-12-15 NOTE — PROGRESS NOTES
[facility]   Physical Therapy Treatment Note     Name: Ivy Salgado  Clinic Number: [id number]    Therapy Diagnosis:   Encounter Diagnosis   Name Primary?    Decreased range of motion of left shoulder Yes     Physician: Luis Madden DO    Visit Date: 12/14/2023     Evaluation Date: 8/8/2023  Authorization Period Expiration: 7/6/2024  Plan of Care Expiration: 12/31/2023  Progress Note Due: 9/10/2023  Visit # / Visits authorized: 33/ 40  Foto: 2/3      Time In: 1510  Time Out: 1600  Total Billable Time: 38 minutes     DOS: 7/18/2023  S/p: left  1. Total shoulder arthroplasty (complex, -22 modifier)  2. Shoulder lysis of adhesions  3. Shoulder subpectoral biceps tenodesis (CPT 06044)  Post-Op Precautions: We will follow the shoulder arthroplasty guidelines. The patient remained in a sling for 6 weeks. We will start PT at the 3-week martina.       Precautions: none    SUBJECTIVE     Pt reports: her shoulder feels sore kuldeep in the front but less radiating pain today    She was compliant with home exercise program.  Response to previous treatment: improvement in pain at rest   Functional change: able to ttol reaching overhead a bit better    Pain: 3/10  Location: left shoulder      OBJECTIVE     No pain with cervical ROM today   Shoulder Flexion Pre 120, post 130     Treatment     Tech used to A with treatment     Ivy received the treatments listed below:      Therapeutic exercises to develop ROM for 16 minutes including:  UBE 4m/4m lvl 3 unilteral for endurance  Thoracic rotations 15x B   Lat flexibility with weight flexion supine 2# 5s holds 20x     Manual therapy techniques: Joint mobilizations and Soft tissue Mobilization were applied to the: L shoulder for 08 minutes, including:  Skilled PROM of L shoulder within tolerance and protocol in flexion/ER/IR, elbow flex/ext, wrist sup/pronation  Grade II mobs GHJ   Pec stretch with scap mob sidelying MET  METs for post/ant cuff activation    UT Taping with kinesiotape   C6-7 L opening mobs with self radial n glides     Neuromuscular re-education activities to improve: motor control for 30 min  UT level 1 30x   Resisted ER 15x 5s holds otb   Resisted IR gtb 3x15  Scaption 4x8  0#  Sidelying ABD 1# 3x10    Patient Education and Home Exercises     Home Exercises Provided and Patient Education Provided     Education provided:   - Add new exercises     Written Home Exercises Provided: yes. Exercises were reviewed and Ivy was able to demonstrate them prior to the end of the session.  Ivy demonstrated good  understanding of the education provided. See EMR under Patient Instructions for exercises provided during therapy sessions    ASSESSMENT     The pt with ability to tolerate higher level exercises today compared to the previous visits due to lower pain levels. Focus on RTC activaiton below shld level to improve scap mobility and GHJ mobility.    Ivy Is progressing well towards her goals.     Pt prognosis is Fair.     Pt will continue to benefit from skilled outpatient physical therapy to address the deficits listed in the problem list box on initial evaluation, provide pt/family education and to maximize pt's level of independence in the home and community environment.     Pt's spiritual, cultural and educational needs considered and pt agreeable to plan of care and goals.     Anticipated barriers to physical therapy: none     Goals:   Short Term Goals: 12 weeks  1. Pt will be compliant with HEP 50% of prescribed amount.   2. The pt to demo improvement in R shoulder PROM to by 80% of the L  3.  The pt to demo tolerance to being out of the sling for 24 hours with pain <2/10     Long Term Goals:  36 weeks   Pt will be compliant with % of prescribed amount.   The pt to improvement in AROM of L shoulder within 80% of R shoulder to improve tolerance to OH movement   The pt to  Demo at least 4+/5 of RTC muscle testing to demo improvement in  tolerance to activity  The pt to tolerate lifting 50# from the ground to waist height per job requirement description   The pt will report full participation in ADLs and IADLs without restrictions related to L Shoulder.      PLAN     Follow TSA procedure     Dorothy Basurto, PT,

## 2023-12-16 ENCOUNTER — PATIENT MESSAGE (OUTPATIENT)
Dept: OBSTETRICS AND GYNECOLOGY | Facility: CLINIC | Age: 43
End: 2023-12-16
Payer: COMMERCIAL

## 2023-12-19 ENCOUNTER — PATIENT MESSAGE (OUTPATIENT)
Dept: REHABILITATION | Facility: HOSPITAL | Age: 43
End: 2023-12-19
Payer: COMMERCIAL

## 2023-12-19 ENCOUNTER — LAB VISIT (OUTPATIENT)
Dept: LAB | Facility: HOSPITAL | Age: 43
End: 2023-12-19
Payer: COMMERCIAL

## 2023-12-19 ENCOUNTER — PATIENT MESSAGE (OUTPATIENT)
Dept: UROLOGY | Facility: CLINIC | Age: 43
End: 2023-12-19
Payer: COMMERCIAL

## 2023-12-19 DIAGNOSIS — Z87.440 HISTORY OF RECURRENT UTI (URINARY TRACT INFECTION): ICD-10-CM

## 2023-12-19 DIAGNOSIS — N30.00 ACUTE CYSTITIS WITHOUT HEMATURIA: Primary | ICD-10-CM

## 2023-12-19 PROCEDURE — 87086 URINE CULTURE/COLONY COUNT: CPT | Performed by: NURSE PRACTITIONER

## 2023-12-19 RX ORDER — NADOLOL 40 MG/1
40 TABLET ORAL DAILY
Qty: 30 TABLET | Refills: 11 | OUTPATIENT
Start: 2023-12-19 | End: 2024-12-18

## 2023-12-19 RX ORDER — CEPHALEXIN 500 MG/1
500 CAPSULE ORAL EVERY 12 HOURS
Qty: 14 CAPSULE | Refills: 0 | Status: SHIPPED | OUTPATIENT
Start: 2023-12-19 | End: 2023-12-26

## 2023-12-20 LAB — BACTERIA UR CULT: NO GROWTH

## 2023-12-20 RX ORDER — NADOLOL 40 MG/1
40 TABLET ORAL DAILY
Qty: 30 TABLET | Refills: 11 | OUTPATIENT
Start: 2023-12-20 | End: 2024-12-19

## 2023-12-21 ENCOUNTER — PATIENT MESSAGE (OUTPATIENT)
Dept: UROLOGY | Facility: CLINIC | Age: 43
End: 2023-12-21
Payer: COMMERCIAL

## 2023-12-21 DIAGNOSIS — M25.512 CHRONIC LEFT SHOULDER PAIN: ICD-10-CM

## 2023-12-21 DIAGNOSIS — Z98.890 S/P SHOULDER SURGERY: ICD-10-CM

## 2023-12-21 DIAGNOSIS — G89.29 CHRONIC LEFT SHOULDER PAIN: ICD-10-CM

## 2023-12-21 RX ORDER — HYDROCODONE BITARTRATE AND ACETAMINOPHEN 7.5; 325 MG/1; MG/1
1 TABLET ORAL
Qty: 7 TABLET | Refills: 0 | Status: SHIPPED | OUTPATIENT
Start: 2023-12-21 | End: 2023-12-28 | Stop reason: SDUPTHER

## 2023-12-21 RX ORDER — CYCLOBENZAPRINE HCL 10 MG
10 TABLET ORAL NIGHTLY
Qty: 15 TABLET | Refills: 0 | Status: SHIPPED | OUTPATIENT
Start: 2023-12-21 | End: 2024-01-30 | Stop reason: SDUPTHER

## 2023-12-21 RX ORDER — NADOLOL 40 MG/1
40 TABLET ORAL DAILY
Qty: 30 TABLET | Refills: 0 | Status: SHIPPED | OUTPATIENT
Start: 2023-12-21 | End: 2024-02-16 | Stop reason: SDUPTHER

## 2023-12-21 NOTE — TELEPHONE ENCOUNTER
Yes it is possible that she passed small stone fragments.  As long as she is feeling better no additional imaging or testing needs to be done at this time.    Thanks, M

## 2023-12-27 ENCOUNTER — CLINICAL SUPPORT (OUTPATIENT)
Dept: REHABILITATION | Facility: HOSPITAL | Age: 43
End: 2023-12-27
Payer: COMMERCIAL

## 2023-12-27 ENCOUNTER — PATIENT MESSAGE (OUTPATIENT)
Dept: GASTROENTEROLOGY | Facility: CLINIC | Age: 43
End: 2023-12-27
Payer: COMMERCIAL

## 2023-12-27 ENCOUNTER — LAB VISIT (OUTPATIENT)
Dept: LAB | Facility: HOSPITAL | Age: 43
End: 2023-12-27
Attending: INTERNAL MEDICINE
Payer: COMMERCIAL

## 2023-12-27 DIAGNOSIS — M25.612 DECREASED RANGE OF MOTION OF LEFT SHOULDER: Primary | ICD-10-CM

## 2023-12-27 DIAGNOSIS — K50.00 CROHN'S DISEASE OF SMALL INTESTINE WITHOUT COMPLICATION: Chronic | ICD-10-CM

## 2023-12-27 DIAGNOSIS — K50.00 CROHN'S DISEASE OF SMALL INTESTINE WITHOUT COMPLICATION: Primary | Chronic | ICD-10-CM

## 2023-12-27 LAB
ALBUMIN SERPL BCP-MCNC: 3.7 G/DL (ref 3.5–5.2)
ALP SERPL-CCNC: 139 U/L (ref 55–135)
ALT SERPL W/O P-5'-P-CCNC: 28 U/L (ref 10–44)
ANION GAP SERPL CALC-SCNC: 11 MMOL/L (ref 8–16)
AST SERPL-CCNC: 32 U/L (ref 10–40)
BASOPHILS # BLD AUTO: 0.02 K/UL (ref 0–0.2)
BASOPHILS NFR BLD: 0.2 % (ref 0–1.9)
BILIRUB SERPL-MCNC: 0.5 MG/DL (ref 0.1–1)
BUN SERPL-MCNC: 9 MG/DL (ref 6–20)
CALCIUM SERPL-MCNC: 9.7 MG/DL (ref 8.7–10.5)
CHLORIDE SERPL-SCNC: 104 MMOL/L (ref 95–110)
CO2 SERPL-SCNC: 26 MMOL/L (ref 23–29)
CREAT SERPL-MCNC: 0.8 MG/DL (ref 0.5–1.4)
CRP SERPL-MCNC: 9.3 MG/L (ref 0–8.2)
DIFFERENTIAL METHOD: NORMAL
EOSINOPHIL # BLD AUTO: 0.1 K/UL (ref 0–0.5)
EOSINOPHIL NFR BLD: 1.4 % (ref 0–8)
ERYTHROCYTE [DISTWIDTH] IN BLOOD BY AUTOMATED COUNT: 13.8 % (ref 11.5–14.5)
EST. GFR  (NO RACE VARIABLE): >60 ML/MIN/1.73 M^2
GLUCOSE SERPL-MCNC: 94 MG/DL (ref 70–110)
HCT VFR BLD AUTO: 38.5 % (ref 37–48.5)
HGB BLD-MCNC: 12.7 G/DL (ref 12–16)
IMM GRANULOCYTES # BLD AUTO: 0.01 K/UL (ref 0–0.04)
IMM GRANULOCYTES NFR BLD AUTO: 0.1 % (ref 0–0.5)
LYMPHOCYTES # BLD AUTO: 2.1 K/UL (ref 1–4.8)
LYMPHOCYTES NFR BLD: 23.9 % (ref 18–48)
MCH RBC QN AUTO: 29.3 PG (ref 27–31)
MCHC RBC AUTO-ENTMCNC: 33 G/DL (ref 32–36)
MCV RBC AUTO: 89 FL (ref 82–98)
MONOCYTES # BLD AUTO: 0.8 K/UL (ref 0.3–1)
MONOCYTES NFR BLD: 8.8 % (ref 4–15)
NEUTROPHILS # BLD AUTO: 5.6 K/UL (ref 1.8–7.7)
NEUTROPHILS NFR BLD: 65.6 % (ref 38–73)
NRBC BLD-RTO: 0 /100 WBC
PLATELET # BLD AUTO: 367 K/UL (ref 150–450)
PMV BLD AUTO: 9.2 FL (ref 9.2–12.9)
POTASSIUM SERPL-SCNC: 4.3 MMOL/L (ref 3.5–5.1)
PROT SERPL-MCNC: 8.3 G/DL (ref 6–8.4)
RBC # BLD AUTO: 4.34 M/UL (ref 4–5.4)
SODIUM SERPL-SCNC: 141 MMOL/L (ref 136–145)
WBC # BLD AUTO: 8.61 K/UL (ref 3.9–12.7)

## 2023-12-27 PROCEDURE — 97112 NEUROMUSCULAR REEDUCATION: CPT

## 2023-12-27 PROCEDURE — 80053 COMPREHEN METABOLIC PANEL: CPT | Performed by: INTERNAL MEDICINE

## 2023-12-27 PROCEDURE — 36415 COLL VENOUS BLD VENIPUNCTURE: CPT | Performed by: INTERNAL MEDICINE

## 2023-12-27 PROCEDURE — 85025 COMPLETE CBC W/AUTO DIFF WBC: CPT | Performed by: INTERNAL MEDICINE

## 2023-12-27 PROCEDURE — 86140 C-REACTIVE PROTEIN: CPT | Performed by: INTERNAL MEDICINE

## 2023-12-27 PROCEDURE — 97140 MANUAL THERAPY 1/> REGIONS: CPT

## 2023-12-27 NOTE — PROGRESS NOTES
[facility]   Physical Therapy Treatment Note     Name: Ivy Salgado  Clinic Number: [id number]    Therapy Diagnosis:   Encounter Diagnosis   Name Primary?    Decreased range of motion of left shoulder Yes       Physician: Luis Madden DO    Visit Date: 12/27/2023     Evaluation Date: 8/8/2023  Authorization Period Expiration: 7/6/2024  Plan of Care Expiration: 12/31/2023  Progress Note Due: 9/10/2023  Visit # / Visits authorized: 34/ 40  Foto: 2/3      Time In: 16:15  Time Out: 17:28  Total Billable Time: 63 minutes     DOS: 7/18/2023  S/p: left  1. Total shoulder arthroplasty (complex, -22 modifier)  2. Shoulder lysis of adhesions  3. Shoulder subpectoral biceps tenodesis (CPT 25457)  Post-Op Precautions: We will follow the shoulder arthroplasty guidelines. The patient remained in a sling for 6 weeks. We will start PT at the 3-week martina.       Precautions: none    SUBJECTIVE     Pt reports: not feeling great today, still dealing with some symptoms down into the arm. Felt some help with taping last time.     She was compliant with home exercise program.  Response to previous treatment: improvement in pain at rest   Functional change: able to ttol reaching overhead a bit better    Pain: 3/10  Location: left shoulder      OBJECTIVE     Cervical range: pain with right rotation and right SB with symptoms down the left arm.     Shoulder Flexion Pre 110, post 140         Treatment     Tech used to A with treatment     Ivy received the treatments listed below:      Therapeutic exercises to develop ROM for 0 minutes including:  UBE 4m/4m lvl 3 unilteral for endurance  Thoracic rotations 15x B   Lat flexibility with weight flexion supine 2# 5s holds 20x     Manual therapy techniques: Joint mobilizations and Soft tissue Mobilization were applied to the: L shoulder for 25 minutes, including:  Skilled PROM of L shoulder within tolerance and protocol in flexion/ER/IR, elbow flex/ext, wrist  sup/pronation  Grade II mobs GHJ   UT Taping with Leukotape from PTA Vidya Lemus   Left lateral side glide mid cervical  Supine CT junction flick mobilizations  Prone CT junction mobilizations - recreation of symptoms down arm    NP  Pec stretch with scap mob sidelying MET  METs for post/ant cuff activation   C6-7 L opening mobs with self radial n glides     Neuromuscular re-education activities to improve: motor control for 30 min  Supine flex with 2# dowel 3x10  Open books modified 2x10 on left   Single arm wall with UT activation 4x6  Sidelying flex with dowel with UT shrug 4x4-6 reps  Sidelying serratus press with dowel and PT assistance 4x6  Pt education    NP  UT level 1 30x   Resisted ER 15x 5s holds otb   Resisted IR gtb 3x15  Scaption 4x8  0#  Sidelying ABD 1# 3x10    Patient Education and Home Exercises     Home Exercises Provided and Patient Education Provided     Education provided:   - Add new exercises     Written Home Exercises Provided: yes. Exercises were reviewed and Ivy was able to demonstrate them prior to the end of the session.  Ivy demonstrated good  understanding of the education provided. See EMR under Patient Instructions for exercises provided during therapy sessions    ASSESSMENT     Ivy getting some neural symptoms on left arm with some recreation with right SB, CT junction mobilizations, and shoulder ER. Still limited with flex but improved with GHJ mobilizations. Recreation of symptoms down left arm with ER with slight improvement with manual. CT junction mobilizations slightly improved but still having symptoms so held. Difficulty with UT activation and improvement with scapular positioning. Worked on UT activation and attempted in gravity minimized positions. Leuko tape at the end of the session some improved symptoms. Will look to progress next visit.     Ivy Is progressing well towards her goals.     Pt prognosis is Fair.     Pt will continue to benefit from skilled  outpatient physical therapy to address the deficits listed in the problem list box on initial evaluation, provide pt/family education and to maximize pt's level of independence in the home and community environment.     Pt's spiritual, cultural and educational needs considered and pt agreeable to plan of care and goals.     Anticipated barriers to physical therapy: none     Goals:   Short Term Goals: 12 weeks  1. Pt will be compliant with HEP 50% of prescribed amount.   2. The pt to demo improvement in R shoulder PROM to by 80% of the L  3.  The pt to demo tolerance to being out of the sling for 24 hours with pain <2/10     Long Term Goals:  36 weeks   Pt will be compliant with % of prescribed amount.   The pt to improvement in AROM of L shoulder within 80% of R shoulder to improve tolerance to OH movement   The pt to  Demo at least 4+/5 of RTC muscle testing to demo improvement in tolerance to activity  The pt to tolerate lifting 50# from the ground to waist height per job requirement description   The pt will report full participation in ADLs and IADLs without restrictions related to L Shoulder.      PLAN     Follow TSA procedure     Demond Tsang, PT,

## 2023-12-28 DIAGNOSIS — G89.29 CHRONIC LEFT SHOULDER PAIN: ICD-10-CM

## 2023-12-28 DIAGNOSIS — Z98.890 S/P SHOULDER SURGERY: ICD-10-CM

## 2023-12-28 DIAGNOSIS — M25.512 CHRONIC LEFT SHOULDER PAIN: ICD-10-CM

## 2023-12-28 RX ORDER — HYDROCODONE BITARTRATE AND ACETAMINOPHEN 7.5; 325 MG/1; MG/1
1 TABLET ORAL
Qty: 7 TABLET | Refills: 0 | Status: ON HOLD | OUTPATIENT
Start: 2023-12-28 | End: 2024-01-07 | Stop reason: HOSPADM

## 2023-12-31 ENCOUNTER — HOSPITAL ENCOUNTER (INPATIENT)
Facility: HOSPITAL | Age: 43
LOS: 5 days | Discharge: HOME | DRG: 386 | End: 2024-01-07
Attending: EMERGENCY MEDICINE | Admitting: EMERGENCY MEDICINE
Payer: COMMERCIAL

## 2023-12-31 DIAGNOSIS — R07.9 CHEST PAIN: ICD-10-CM

## 2023-12-31 DIAGNOSIS — R11.0 NAUSEA: ICD-10-CM

## 2023-12-31 DIAGNOSIS — R79.89 ELEVATED LIVER FUNCTION TESTS: ICD-10-CM

## 2023-12-31 DIAGNOSIS — K50.018 CROHN'S DISEASE OF SMALL INTESTINE WITH OTHER COMPLICATION: Primary | ICD-10-CM

## 2023-12-31 LAB
ALBUMIN SERPL BCP-MCNC: 3.3 G/DL (ref 3.5–5.2)
ALP SERPL-CCNC: 175 U/L (ref 55–135)
ALT SERPL W/O P-5'-P-CCNC: 41 U/L (ref 10–44)
ANION GAP SERPL CALC-SCNC: 13 MMOL/L (ref 8–16)
AST SERPL-CCNC: 30 U/L (ref 10–40)
BASOPHILS # BLD AUTO: 0.02 K/UL (ref 0–0.2)
BASOPHILS NFR BLD: 0.2 % (ref 0–1.9)
BILIRUB SERPL-MCNC: 0.4 MG/DL (ref 0.1–1)
BILIRUB UR QL STRIP: NEGATIVE
BUN SERPL-MCNC: 9 MG/DL (ref 6–20)
CALCIUM SERPL-MCNC: 9.9 MG/DL (ref 8.7–10.5)
CHLORIDE SERPL-SCNC: 106 MMOL/L (ref 95–110)
CLARITY UR REFRACT.AUTO: CLEAR
CO2 SERPL-SCNC: 24 MMOL/L (ref 23–29)
COLOR UR AUTO: YELLOW
CREAT SERPL-MCNC: 0.6 MG/DL (ref 0.5–1.4)
CRP SERPL-MCNC: 22.8 MG/L (ref 0–8.2)
DIFFERENTIAL METHOD BLD: ABNORMAL
EOSINOPHIL # BLD AUTO: 0.1 K/UL (ref 0–0.5)
EOSINOPHIL NFR BLD: 1.2 % (ref 0–8)
ERYTHROCYTE [DISTWIDTH] IN BLOOD BY AUTOMATED COUNT: 13.5 % (ref 11.5–14.5)
ERYTHROCYTE [SEDIMENTATION RATE] IN BLOOD BY PHOTOMETRIC METHOD: 94 MM/HR (ref 0–36)
EST. GFR  (NO RACE VARIABLE): >60 ML/MIN/1.73 M^2
GLUCOSE SERPL-MCNC: 97 MG/DL (ref 70–110)
GLUCOSE UR QL STRIP: NEGATIVE
HCT VFR BLD AUTO: 36.8 % (ref 37–48.5)
HGB BLD-MCNC: 12.2 G/DL (ref 12–16)
HGB UR QL STRIP: NEGATIVE
IMM GRANULOCYTES # BLD AUTO: 0.02 K/UL (ref 0–0.04)
IMM GRANULOCYTES NFR BLD AUTO: 0.2 % (ref 0–0.5)
KETONES UR QL STRIP: NEGATIVE
LEUKOCYTE ESTERASE UR QL STRIP: NEGATIVE
LIPASE SERPL-CCNC: 22 U/L (ref 4–60)
LYMPHOCYTES # BLD AUTO: 1.8 K/UL (ref 1–4.8)
LYMPHOCYTES NFR BLD: 18.2 % (ref 18–48)
MCH RBC QN AUTO: 29.2 PG (ref 27–31)
MCHC RBC AUTO-ENTMCNC: 33.2 G/DL (ref 32–36)
MCV RBC AUTO: 88 FL (ref 82–98)
MONOCYTES # BLD AUTO: 1.1 K/UL (ref 0.3–1)
MONOCYTES NFR BLD: 11.4 % (ref 4–15)
NEUTROPHILS # BLD AUTO: 6.6 K/UL (ref 1.8–7.7)
NEUTROPHILS NFR BLD: 68.8 % (ref 38–73)
NITRITE UR QL STRIP: NEGATIVE
NRBC BLD-RTO: 0 /100 WBC
PH UR STRIP: 5 [PH] (ref 5–8)
PLATELET # BLD AUTO: 338 K/UL (ref 150–450)
PMV BLD AUTO: 9.3 FL (ref 9.2–12.9)
POTASSIUM SERPL-SCNC: 3.8 MMOL/L (ref 3.5–5.1)
PROT SERPL-MCNC: 8 G/DL (ref 6–8.4)
PROT UR QL STRIP: NEGATIVE
RBC # BLD AUTO: 4.18 M/UL (ref 4–5.4)
SODIUM SERPL-SCNC: 143 MMOL/L (ref 136–145)
SP GR UR STRIP: 1.02 (ref 1–1.03)
URN SPEC COLLECT METH UR: NORMAL
WBC # BLD AUTO: 9.66 K/UL (ref 3.9–12.7)

## 2023-12-31 PROCEDURE — 87427 SHIGA-LIKE TOXIN AG IA: CPT | Performed by: EMERGENCY MEDICINE

## 2023-12-31 PROCEDURE — 94761 N-INVAS EAR/PLS OXIMETRY MLT: CPT

## 2023-12-31 PROCEDURE — 87449 NOS EACH ORGANISM AG IA: CPT | Performed by: EMERGENCY MEDICINE

## 2023-12-31 PROCEDURE — 81003 URINALYSIS AUTO W/O SCOPE: CPT | Performed by: EMERGENCY MEDICINE

## 2023-12-31 PROCEDURE — 87449 NOS EACH ORGANISM AG IA: CPT | Mod: 91 | Performed by: EMERGENCY MEDICINE

## 2023-12-31 PROCEDURE — 85025 COMPLETE CBC W/AUTO DIFF WBC: CPT | Performed by: EMERGENCY MEDICINE

## 2023-12-31 PROCEDURE — 63600175 PHARM REV CODE 636 W HCPCS: Performed by: PHYSICIAN ASSISTANT

## 2023-12-31 PROCEDURE — 96374 THER/PROPH/DIAG INJ IV PUSH: CPT

## 2023-12-31 PROCEDURE — 25000003 PHARM REV CODE 250: Performed by: PHYSICIAN ASSISTANT

## 2023-12-31 PROCEDURE — G0378 HOSPITAL OBSERVATION PER HR: HCPCS

## 2023-12-31 PROCEDURE — 80053 COMPREHEN METABOLIC PANEL: CPT | Performed by: EMERGENCY MEDICINE

## 2023-12-31 PROCEDURE — 93005 ELECTROCARDIOGRAM TRACING: CPT

## 2023-12-31 PROCEDURE — 86140 C-REACTIVE PROTEIN: CPT | Performed by: EMERGENCY MEDICINE

## 2023-12-31 PROCEDURE — 83690 ASSAY OF LIPASE: CPT | Performed by: EMERGENCY MEDICINE

## 2023-12-31 PROCEDURE — 96361 HYDRATE IV INFUSION ADD-ON: CPT

## 2023-12-31 PROCEDURE — 87040 BLOOD CULTURE FOR BACTERIA: CPT | Performed by: EMERGENCY MEDICINE

## 2023-12-31 PROCEDURE — 93005 ELECTROCARDIOGRAM TRACING: CPT | Performed by: INTERNAL MEDICINE

## 2023-12-31 PROCEDURE — 93010 ELECTROCARDIOGRAM REPORT: CPT | Mod: ,,, | Performed by: INTERNAL MEDICINE

## 2023-12-31 PROCEDURE — 85652 RBC SED RATE AUTOMATED: CPT | Performed by: EMERGENCY MEDICINE

## 2023-12-31 PROCEDURE — 63600175 PHARM REV CODE 636 W HCPCS: Performed by: EMERGENCY MEDICINE

## 2023-12-31 PROCEDURE — 25500020 PHARM REV CODE 255: Performed by: EMERGENCY MEDICINE

## 2023-12-31 PROCEDURE — 96375 TX/PRO/DX INJ NEW DRUG ADDON: CPT

## 2023-12-31 PROCEDURE — 99285 EMERGENCY DEPT VISIT HI MDM: CPT | Mod: 25

## 2023-12-31 PROCEDURE — 87045 FECES CULTURE AEROBIC BACT: CPT | Performed by: EMERGENCY MEDICINE

## 2023-12-31 PROCEDURE — 25000003 PHARM REV CODE 250: Performed by: FAMILY MEDICINE

## 2023-12-31 PROCEDURE — 83993 ASSAY FOR CALPROTECTIN FECAL: CPT | Performed by: EMERGENCY MEDICINE

## 2023-12-31 PROCEDURE — 87046 STOOL CULTR AEROBIC BACT EA: CPT | Performed by: EMERGENCY MEDICINE

## 2023-12-31 PROCEDURE — 96372 THER/PROPH/DIAG INJ SC/IM: CPT | Performed by: PHYSICIAN ASSISTANT

## 2023-12-31 PROCEDURE — 87209 SMEAR COMPLEX STAIN: CPT | Performed by: EMERGENCY MEDICINE

## 2023-12-31 RX ORDER — BISACODYL 10 MG/1
10 SUPPOSITORY RECTAL DAILY PRN
Status: DISCONTINUED | OUTPATIENT
Start: 2023-12-31 | End: 2024-01-07 | Stop reason: HOSPADM

## 2023-12-31 RX ORDER — ENOXAPARIN SODIUM 100 MG/ML
40 INJECTION SUBCUTANEOUS EVERY 24 HOURS
Status: DISCONTINUED | OUTPATIENT
Start: 2023-12-31 | End: 2024-01-07 | Stop reason: HOSPADM

## 2023-12-31 RX ORDER — GLUCAGON 1 MG
1 KIT INJECTION
Status: DISCONTINUED | OUTPATIENT
Start: 2023-12-31 | End: 2024-01-07 | Stop reason: HOSPADM

## 2023-12-31 RX ORDER — PROPRANOLOL HYDROCHLORIDE 10 MG/1
40 TABLET ORAL 2 TIMES DAILY
Status: DISCONTINUED | OUTPATIENT
Start: 2024-01-01 | End: 2024-01-01

## 2023-12-31 RX ORDER — PROCHLORPERAZINE EDISYLATE 5 MG/ML
5 INJECTION INTRAMUSCULAR; INTRAVENOUS EVERY 6 HOURS PRN
Status: DISCONTINUED | OUTPATIENT
Start: 2023-12-31 | End: 2024-01-07 | Stop reason: HOSPADM

## 2023-12-31 RX ORDER — CLONAZEPAM 0.5 MG/1
1 TABLET ORAL 2 TIMES DAILY
Status: DISCONTINUED | OUTPATIENT
Start: 2023-12-31 | End: 2024-01-07 | Stop reason: HOSPADM

## 2023-12-31 RX ORDER — SODIUM CHLORIDE, SODIUM LACTATE, POTASSIUM CHLORIDE, CALCIUM CHLORIDE 600; 310; 30; 20 MG/100ML; MG/100ML; MG/100ML; MG/100ML
INJECTION, SOLUTION INTRAVENOUS CONTINUOUS
Status: ACTIVE | OUTPATIENT
Start: 2023-12-31 | End: 2024-01-01

## 2023-12-31 RX ORDER — DRONABINOL 2.5 MG/1
5 CAPSULE ORAL
Status: DISCONTINUED | OUTPATIENT
Start: 2024-01-01 | End: 2024-01-07 | Stop reason: HOSPADM

## 2023-12-31 RX ORDER — TALC
6 POWDER (GRAM) TOPICAL NIGHTLY PRN
Status: DISCONTINUED | OUTPATIENT
Start: 2023-12-31 | End: 2023-12-31

## 2023-12-31 RX ORDER — POLYETHYLENE GLYCOL 3350 17 G/17G
17 POWDER, FOR SOLUTION ORAL DAILY PRN
Status: DISCONTINUED | OUTPATIENT
Start: 2023-12-31 | End: 2024-01-07 | Stop reason: HOSPADM

## 2023-12-31 RX ORDER — PROCHLORPERAZINE EDISYLATE 5 MG/ML
10 INJECTION INTRAMUSCULAR; INTRAVENOUS
Status: COMPLETED | OUTPATIENT
Start: 2023-12-31 | End: 2023-12-31

## 2023-12-31 RX ORDER — HYDROMORPHONE HYDROCHLORIDE 1 MG/ML
1 INJECTION, SOLUTION INTRAMUSCULAR; INTRAVENOUS; SUBCUTANEOUS
Status: COMPLETED | OUTPATIENT
Start: 2023-12-31 | End: 2023-12-31

## 2023-12-31 RX ORDER — OXYCODONE HYDROCHLORIDE 5 MG/1
5 TABLET ORAL EVERY 6 HOURS PRN
Status: DISCONTINUED | OUTPATIENT
Start: 2023-12-31 | End: 2024-01-01

## 2023-12-31 RX ORDER — PANTOPRAZOLE SODIUM 40 MG/1
40 TABLET, DELAYED RELEASE ORAL 2 TIMES DAILY
Status: DISCONTINUED | OUTPATIENT
Start: 2023-12-31 | End: 2024-01-07 | Stop reason: HOSPADM

## 2023-12-31 RX ORDER — GABAPENTIN 300 MG/1
300 CAPSULE ORAL 2 TIMES DAILY
Status: DISCONTINUED | OUTPATIENT
Start: 2023-12-31 | End: 2024-01-07 | Stop reason: HOSPADM

## 2023-12-31 RX ORDER — ZOLPIDEM TARTRATE 5 MG/1
10 TABLET ORAL NIGHTLY PRN
Status: DISCONTINUED | OUTPATIENT
Start: 2023-12-31 | End: 2024-01-07 | Stop reason: HOSPADM

## 2023-12-31 RX ORDER — MIRTAZAPINE 15 MG/1
30 TABLET, ORALLY DISINTEGRATING ORAL NIGHTLY
Status: DISCONTINUED | OUTPATIENT
Start: 2023-12-31 | End: 2024-01-07 | Stop reason: HOSPADM

## 2023-12-31 RX ORDER — SODIUM CHLORIDE 0.9 % (FLUSH) 0.9 %
10 SYRINGE (ML) INJECTION
Status: DISCONTINUED | OUTPATIENT
Start: 2023-12-31 | End: 2024-01-07 | Stop reason: HOSPADM

## 2023-12-31 RX ORDER — OXYCODONE HYDROCHLORIDE 10 MG/1
10 TABLET ORAL EVERY 6 HOURS PRN
Status: DISCONTINUED | OUTPATIENT
Start: 2023-12-31 | End: 2024-01-01

## 2023-12-31 RX ORDER — HYDROMORPHONE HYDROCHLORIDE 1 MG/ML
1 INJECTION, SOLUTION INTRAMUSCULAR; INTRAVENOUS; SUBCUTANEOUS ONCE
Status: COMPLETED | OUTPATIENT
Start: 2023-12-31 | End: 2023-12-31

## 2023-12-31 RX ORDER — HYDROMORPHONE HYDROCHLORIDE 1 MG/ML
0.5 INJECTION, SOLUTION INTRAMUSCULAR; INTRAVENOUS; SUBCUTANEOUS EVERY 6 HOURS PRN
Status: DISCONTINUED | OUTPATIENT
Start: 2023-12-31 | End: 2024-01-07

## 2023-12-31 RX ORDER — IPRATROPIUM BROMIDE AND ALBUTEROL SULFATE 2.5; .5 MG/3ML; MG/3ML
3 SOLUTION RESPIRATORY (INHALATION) EVERY 4 HOURS PRN
Status: DISCONTINUED | OUTPATIENT
Start: 2023-12-31 | End: 2024-01-07 | Stop reason: HOSPADM

## 2023-12-31 RX ORDER — ACETAMINOPHEN 325 MG/1
650 TABLET ORAL EVERY 4 HOURS PRN
Status: DISCONTINUED | OUTPATIENT
Start: 2023-12-31 | End: 2024-01-04

## 2023-12-31 RX ORDER — IBUPROFEN 200 MG
24 TABLET ORAL
Status: DISCONTINUED | OUTPATIENT
Start: 2023-12-31 | End: 2024-01-07 | Stop reason: HOSPADM

## 2023-12-31 RX ORDER — NALOXONE HCL 0.4 MG/ML
0.4 VIAL (ML) INJECTION
Status: DISCONTINUED | OUTPATIENT
Start: 2023-12-31 | End: 2024-01-07 | Stop reason: HOSPADM

## 2023-12-31 RX ORDER — PROMETHAZINE HYDROCHLORIDE 25 MG/1
25 TABLET ORAL EVERY 6 HOURS PRN
Status: DISCONTINUED | OUTPATIENT
Start: 2023-12-31 | End: 2024-01-07 | Stop reason: HOSPADM

## 2023-12-31 RX ORDER — IBUPROFEN 200 MG
16 TABLET ORAL
Status: DISCONTINUED | OUTPATIENT
Start: 2023-12-31 | End: 2024-01-07 | Stop reason: HOSPADM

## 2023-12-31 RX ORDER — MIRTAZAPINE 15 MG/1
30 TABLET, ORALLY DISINTEGRATING ORAL NIGHTLY
Status: DISCONTINUED | OUTPATIENT
Start: 2023-12-31 | End: 2023-12-31

## 2023-12-31 RX ADMIN — SODIUM CHLORIDE, POTASSIUM CHLORIDE, SODIUM LACTATE AND CALCIUM CHLORIDE 1000 ML: 600; 310; 30; 20 INJECTION, SOLUTION INTRAVENOUS at 04:12

## 2023-12-31 RX ADMIN — SODIUM CHLORIDE, POTASSIUM CHLORIDE, SODIUM LACTATE AND CALCIUM CHLORIDE: 600; 310; 30; 20 INJECTION, SOLUTION INTRAVENOUS at 07:12

## 2023-12-31 RX ADMIN — PROMETHAZINE HYDROCHLORIDE 25 MG: 25 TABLET ORAL at 09:12

## 2023-12-31 RX ADMIN — GABAPENTIN 300 MG: 300 CAPSULE ORAL at 09:12

## 2023-12-31 RX ADMIN — OXYCODONE HYDROCHLORIDE 5 MG: 5 TABLET ORAL at 10:12

## 2023-12-31 RX ADMIN — CLONAZEPAM 1 MG: 0.5 TABLET ORAL at 09:12

## 2023-12-31 RX ADMIN — PROCHLORPERAZINE EDISYLATE 10 MG: 5 INJECTION INTRAMUSCULAR; INTRAVENOUS at 05:12

## 2023-12-31 RX ADMIN — IOHEXOL 75 ML: 350 INJECTION, SOLUTION INTRAVENOUS at 06:12

## 2023-12-31 RX ADMIN — HYDROMORPHONE HYDROCHLORIDE 1 MG: 1 INJECTION, SOLUTION INTRAMUSCULAR; INTRAVENOUS; SUBCUTANEOUS at 04:12

## 2023-12-31 RX ADMIN — HYDROMORPHONE HYDROCHLORIDE 1 MG: 1 INJECTION, SOLUTION INTRAMUSCULAR; INTRAVENOUS; SUBCUTANEOUS at 07:12

## 2023-12-31 RX ADMIN — ENOXAPARIN SODIUM 40 MG: 40 INJECTION SUBCUTANEOUS at 07:12

## 2023-12-31 RX ADMIN — PANTOPRAZOLE SODIUM 40 MG: 40 TABLET, DELAYED RELEASE ORAL at 09:12

## 2023-12-31 RX ADMIN — MIRTAZAPINE 30 MG: 15 TABLET, ORALLY DISINTEGRATING ORAL at 09:12

## 2023-12-31 NOTE — Clinical Note
Diagnosis: Crohn's disease of small intestine with other complication [063995]   Future Attending Provider: ASHELY EARLY [[id number]]   Admitting Provider:: MARIA T CARRINGTON [[id number]]

## 2023-12-31 NOTE — ED PROVIDER NOTES
Encounter Date: 12/31/2023       History     Chief Complaint   Patient presents with    Abdominal Pain     Has ileostomy. Crp was sl elevated, more stools     43-year-old female, history of Crohn's disease, multiple abdominal surgeries, ostomy, complaining of abdominal pain x4 days, severe, worsening.  Primarily on her right side radiating to her flank.  Patient has had no vomiting but positive nausea.  She has had subjective fevers.  No urinary symptoms.    The history is provided by the patient.     Review of patient's allergies indicates:   Allergen Reactions    Azathioprine sodium Other (See Comments)     Other reaction(s): pancreatitis  Other reaction(s): pancreatitis    Methotrexate analogues Other (See Comments)     leukopenia    Stelara [ustekinumab] Other (See Comments)     Multiple infections    Zofran [ondansetron hcl (pf)] Other (See Comments)     Per patient causes prolong QT    Vancomycin analogues Other (See Comments)     Made her red    Azathioprine      Other reaction(s): Unknown    Methotrexate      Other reaction(s): infection-    Morphine Itching and Other (See Comments)     Other reaction(s): Itching    Zofran [ondansetron hcl]      Other reaction(s): Hives    Bactrim [sulfamethoxazole-trimethoprim] Palpitations    Ciprofloxacin Palpitations     Past Medical History:   Diagnosis Date    Abnormal Pap smear 2007    Abnormal Pap smear 5/26/2011    Anemia     Anxiety     Arthritis     C. difficile diarrhea     Crohn's disease     Depression 8/5/2017    Encounter for blood transfusion     Genital HSV     History of colposcopy with cervical biopsy 2007 and 7/2011 2007-LYLA I  and 7/2011- LYLA I    Hypertension     Kidney stone     Kidney stone     Melanoma     Recurrent UTI 4/3/2013    S/P ileostomy 7/9/2012    Sterilization 6/23/2012     Past Surgical History:   Procedure Laterality Date    ABDOMINAL SURGERY      APPENDECTOMY      ARTHROPLASTY OF SHOULDER Left 7/18/2023    Procedure: ARTHROPLASTY,  SHOULDER;  Surgeon: Sharon Babb MD;  Location: [facility] OR;  Service: Orthopedics;  Laterality: Left;    AUGMENTATION OF BREAST Bilateral 06/2022    gel implants    BILATERAL SALPINGO-OOPHORECTOMY (BSO) Bilateral 05/30/2019    Procedure: SALPINGO-OOPHORECTOMY, BILATERAL;  Surgeon: Rupa German MD;  Location: [facility] OR [address] [place] [address];  Service: OB/GYN;  Laterality: Bilateral;    BLADDER SURGERY      partial cystectomy due to fistula    breast lift      BREAST SURGERY      [facility]      COLON SURGERY      COLONOSCOPY      CYSTOSCOPY  09/23/2020    Procedure: CYSTOSCOPY;  Surgeon: Sascha Florentino MD;  Location: [facility] OR [address] [place] [address];  Service: Urology;;    CYSTOSCOPY W/ URETERAL STENT PLACEMENT Left 09/15/2020    Procedure: CYSTOSCOPY, WITH URETERAL STENT INSERTION;  Surgeon: Sascha Florentino MD;  Location: [facility] OR [address] [place] [address];  Service: Urology;  Laterality: Left;    DIAGNOSTIC LAPAROSCOPY N/A 07/09/2020    Procedure: LAPAROSCOPY, DIAGNOSTIC;  Surgeon: Gilson El MD;  Location: [facility];  Service: OB/GYN;  Laterality: N/A;    EXCISION OF MELANOMA  07/17/2019    ILEOSTOMY      LAPAROSCOPIC LYSIS OF ADHESIONS N/A 07/09/2020    Procedure: LYSIS, ADHESIONS, LAPAROSCOPIC;  Surgeon: Gilson El MD;  Location: [facility];  Service: OB/GYN;  Laterality: N/A;    LASER LITHOTRIPSY  09/23/2020    Procedure: LITHOTRIPSY, USING LASER;  Surgeon: Sascha Florentino MD;  Location: [facility] OR [address] [place] [address];  Service: Urology;;    LYSIS OF ADHESIONS N/A 05/30/2019    Procedure: LYSIS, ADHESIONS;  Surgeon: Rupa German MD;  Location: [facility] OR [address] [place] [address];  Service: OB/GYN;  Laterality: N/A;    OOPHORECTOMY Right 04/16/2015    PORTACATH PLACEMENT  02/21/2017    SKIN BIOPSY      SMALL INTESTINE SURGERY      age 18 Y    TOTAL ABDOMINAL HYSTERECTOMY  04/16/2015    TOTAL COLECTOMY      TUBAL LIGATION  06/06/2012    UPPER GASTROINTESTINAL ENDOSCOPY      URETEROSCOPIC REMOVAL OF URETERIC CALCULUS  09/23/2020    Procedure: REMOVAL, CALCULUS, URETER,  URETEROSCOPIC;  Surgeon: Sascha Florentino MD;  Location: [facility] OR [address] [place] [address];  Service: Urology;;    URETEROSCOPY Left 09/23/2020    Procedure: URETEROSCOPY;  Surgeon: Sascha Florentino MD;  Location: [facility] OR [address] [place] [address];  Service: Urology;  Laterality: Left;     Family History   Problem Relation Age of Onset    Diabetes Paternal Grandfather     Hearing loss Paternal Grandmother     Cancer Maternal Grandfather         Skin    Skin cancer Maternal Grandfather     Diabetes Maternal Grandfather     Heart disease Maternal Grandfather     Colon cancer Father     Cancer Father         Colon    Liver cancer Father     Hyperlipidemia Father     Hypertension Mother     Crohn's disease Brother     Endometrial cancer Maternal Aunt     Breast cancer Maternal Cousin 41    Crohn's disease Daughter     Ovarian cancer Neg Hx     Melanoma Neg Hx      Social History     Tobacco Use    Smoking status: Never    Smokeless tobacco: Never   Substance Use Topics    Alcohol use: Not Currently     Alcohol/week: 0.0 standard drinks of alcohol    Drug use: No     Review of Systems    Physical Exam     Initial Vitals [12/31/23 1344]   BP Pulse Resp Temp SpO2   136/69 89 18 99.9 °F (37.7 °C) 99 %      MAP       --         Physical Exam    Nursing note and vitals reviewed.  Constitutional: Vital signs are normal. She appears well-developed and well-nourished. She is not diaphoretic.  Non-toxic appearance. She does not appear ill. She appears distressed.   HENT:   Head: Normocephalic and atraumatic.   Mouth/Throat: Mucous membranes are normal. Mucous membranes are not dry.   Eyes: Conjunctivae and lids are normal.   Neck: Neck supple.   Normal range of motion.  Cardiovascular:  Normal rate.           Pulmonary/Chest: No respiratory distress.   Abdominal: Abdomen is soft. She exhibits no distension. There is abdominal tenderness.   Right lower quadrant ostomy, no stool in bag There is no rebound and no guarding.   Musculoskeletal:         General:  No tenderness or edema.      Cervical back: Normal range of motion and neck supple.     Neurological: She is alert and oriented to person, place, and time.   Skin: Skin is dry and intact. No pallor.   Psychiatric: She has a normal mood and affect. Her speech is normal and behavior is normal.         ED Course   Procedures  Labs Reviewed   CBC W/ AUTO DIFFERENTIAL - Abnormal; Notable for the following components:       Result Value    Hematocrit 36.8 (*)     Mono # 1.1 (*)     All other components within normal limits   COMPREHENSIVE METABOLIC PANEL - Abnormal; Notable for the following components:    Albumin 3.3 (*)     Alkaline Phosphatase 175 (*)     All other components within normal limits   SEDIMENTATION RATE - Abnormal; Notable for the following components:    Sed Rate 94 (*)     All other components within normal limits   C-REACTIVE PROTEIN - Abnormal; Notable for the following components:    CRP 22.8 (*)     All other components within normal limits   CULTURE, STOOL   CLOSTRIDIUM DIFFICILE   CULTURE, BLOOD   CULTURE, BLOOD   LIPASE   URINALYSIS, REFLEX TO URINE CULTURE    Narrative:     Specimen Source->Urine   CALPROTECTIN, STOOL   STOOL EXAM-OVA,CYSTS,PARASITES   ISTAT CHEM8          Imaging Results               CT Abdomen Pelvis With IV Contrast NO Oral Contrast (Final result)  Result time 12/31/23 18:42:47      Final result by Kelby Solano MD (12/31/23 18:42:47)                   Impression:      This report was flagged in Epic as abnormal.    1. Slow flow through a few mid to distal small bowel loops noting venous vascular engorgement about these loops and wall hyperemia concerning for inflammatory enteritis in this patient with history of inflammatory bowel disease.  No high-grade obstruction at this time.  No findings to suggest abscess.  2. Surgical changes of partial small-bowel resection, colectomy, and right quadrant ileostomy without obstruction.  3. Hepatomegaly, correlation with LFTs  recommended.  4. Please see above for several additional findings.      Electronically signed by: Kelby Solano MD  Date:    12/31/2023  Time:    18:42               Narrative:    EXAMINATION:  CT ABDOMEN PELVIS WITH IV CONTRAST    CLINICAL HISTORY:  Abdominal pain, acute, nonlocalized;    TECHNIQUE:  Low dose axial images, sagittal and coronal reformations were obtained from the lung bases to the pubic symphysis following the IV administration of 75 mL of Omnipaque 350 no or    COMPARISON:  CT 10/24/2023    FINDINGS:  Images of the lower thorax are remarkable for bilateral dependent atelectasis.    The liver is enlarged, correlation with LFTs recommended.  The spleen, pancreas, gallbladder and adrenal glands are grossly unremarkable.  There is no biliary dilation or ascites.  The portal vein, splenic vein, SMV, celiac axis and SMA all are patent.  There are a few scattered upper limit of normal caliber abdominal lymph nodes.    The kidneys enhance symmetrically without hydronephrosis or nephrolithiasis.  Subcentimeter low attenuating lesions arise from the kidneys bilaterally, too small for characterization.  The bilateral ureters are unable to be followed in their entirety to the urinary bladder, no definite calculi seen or secondary findings to suggest obstructive uropathy.  There is decompression of a few scattered mid small bowel loops noting distension and slow flow of the distal small bowel and associated wall hyperemia.  The ileostomy appears patent there is scattered interloop fluid and venous vascular engorgement involving the distended bowel loops, no findings to suggest abscess.  Several enlarged central mesenteric lymph nodes noted.    There are degenerative changes of the spine.  There are bilateral breast prostheses.  There is dystrophic calcification within the right gluteal soft tissues.  No significant inguinal lymphadenopathy.                                       Medications   sodium chloride  0.9% flush 10 mL (has no administration in time range)   albuterol-ipratropium 2.5 mg-0.5 mg/3 mL nebulizer solution 3 mL (has no administration in time range)   polyethylene glycol packet 17 g (has no administration in time range)   bisacodyL suppository 10 mg (has no administration in time range)   acetaminophen tablet 650 mg (has no administration in time range)   glucose chewable tablet 16 g (has no administration in time range)   glucose chewable tablet 24 g (has no administration in time range)   glucagon (human recombinant) injection 1 mg (has no administration in time range)   enoxaparin injection 40 mg (40 mg Subcutaneous Given 12/31/23 1956)   prochlorperazine injection Soln 5 mg (has no administration in time range)   dextrose 10% bolus 125 mL 125 mL (has no administration in time range)   dextrose 10% bolus 250 mL 250 mL (has no administration in time range)   promethazine tablet 25 mg (has no administration in time range)   lactated ringers infusion ( Intravenous New Bag 12/31/23 1954)   droNABinol capsule 5 mg (has no administration in time range)   clonazePAM tablet 1 mg (1 mg Oral Given 12/31/23 2110)   gabapentin capsule 300 mg (300 mg Oral Given 12/31/23 2111)   multivitamin tablet (has no administration in time range)   mirtazapine disintegrating tablet 30 mg (has no administration in time range)   pantoprazole EC tablet 40 mg (40 mg Oral Given 12/31/23 2111)   propranoloL tablet 40 mg (has no administration in time range)   zolpidem tablet 10 mg (has no administration in time range)   oxyCODONE immediate release tablet 5 mg (has no administration in time range)   oxyCODONE immediate release tablet 10 mg (has no administration in time range)   HYDROmorphone injection 0.5 mg (has no administration in time range)   naloxone 0.4 mg/mL injection 0.4 mg (has no administration in time range)   HYDROmorphone injection 1 mg (1 mg Intravenous Given 12/31/23 1653)   lactated ringers bolus 1,000 mL (0 mLs  Intravenous Stopped 12/31/23 1754)   prochlorperazine injection Soln 10 mg (10 mg Intravenous Given 12/31/23 1737)   iohexoL (OMNIPAQUE 350) injection 75 mL (75 mLs Intravenous Given 12/31/23 1829)   HYDROmorphone injection 1 mg (1 mg Intravenous Given 12/31/23 1950)     Medical Decision Making  DDx for abdominal pain would include cholecystitis, appendicitis, diverticulitis, pancreatitis, cholangitis, hepatitis, bowel perforation or obstruction, volvulus, gastritis, renal colic, vascular catastrophe like AAA, aortic dissection, or mesenteric ischemia. Other less dangerous problems such as gastroparesis, cyclic vomiting syndrome, and constipation are also possible.    My highest suspicion at this time is for SBO  -This patient does need emergent laboratory testing: CBC, CMP, CRP, ESR, lipase  -Medications to be administered: dilaudid  -Emergent imaging is indicated at this time: CT abd/pelvis  -Disposition: likely admission      Amount and/or Complexity of Data Reviewed  External Data Reviewed: notes.  Labs: ordered. Decision-making details documented in ED Course.  Radiology: ordered and independent interpretation performed. Decision-making details documented in ED Course.  ECG/medicine tests: ordered and independent interpretation performed. Decision-making details documented in ED Course.    Risk  Prescription drug management.  Decision regarding hospitalization.               ED Course as of 12/31/23 2120   Sun Dec 31, 2023   1729 CRP(!): 22.8 [AS]   1729 Sed Rate(!): 94 [AS]   1856 CT scan shows findings consistent with an IBD flare.  I have discussed the case with the GI fellow on-call.  He recommends admission, would like us to hold off on IV steroids for now and they will evaluate the patient in the morning.  He wants the patient NPO after midnight in case she gets scoped tomorrow.  He has suggested that we add stool cultures, blood cultures. [AS]      ED Course User Index  [AS] Shelby Chavez MD                            Clinical Impression:  Final diagnoses:  [R11.0] Nausea  [K50.018] Crohn's disease of small intestine with other complication (Primary)          ED Disposition Condition    Observation Stable                Shelby Chavez MD  12/31/23 2127

## 2023-12-31 NOTE — ED NOTES
"Patient is irate in , refused teletriage. Yelled, "Can I have someone who knows what the f--- is going on with me. I don't feel like going through my whole history."  "

## 2024-01-01 LAB
ANION GAP SERPL CALC-SCNC: 16 MMOL/L (ref 8–16)
BASOPHILS # BLD AUTO: 0.01 K/UL (ref 0–0.2)
BASOPHILS NFR BLD: 0.1 % (ref 0–1.9)
BUN SERPL-MCNC: 6 MG/DL (ref 6–20)
C DIFF GDH STL QL: NEGATIVE
C DIFF TOX A+B STL QL IA: NEGATIVE
CALCIUM SERPL-MCNC: 8.8 MG/DL (ref 8.7–10.5)
CHLORIDE SERPL-SCNC: 102 MMOL/L (ref 95–110)
CO2 SERPL-SCNC: 24 MMOL/L (ref 23–29)
CREAT SERPL-MCNC: 0.7 MG/DL (ref 0.5–1.4)
DIFFERENTIAL METHOD BLD: ABNORMAL
EOSINOPHIL # BLD AUTO: 0 K/UL (ref 0–0.5)
EOSINOPHIL NFR BLD: 0.4 % (ref 0–8)
ERYTHROCYTE [DISTWIDTH] IN BLOOD BY AUTOMATED COUNT: 13.5 % (ref 11.5–14.5)
EST. GFR  (NO RACE VARIABLE): >60 ML/MIN/1.73 M^2
ESTIMATED AVG GLUCOSE: 100 MG/DL (ref 68–131)
GLUCOSE SERPL-MCNC: 105 MG/DL (ref 70–110)
HBA1C MFR BLD: 5.1 % (ref 4–5.6)
HCT VFR BLD AUTO: 34.9 % (ref 37–48.5)
HGB BLD-MCNC: 11.7 G/DL (ref 12–16)
IMM GRANULOCYTES # BLD AUTO: 0.05 K/UL (ref 0–0.04)
IMM GRANULOCYTES NFR BLD AUTO: 0.5 % (ref 0–0.5)
LYMPHOCYTES # BLD AUTO: 1.2 K/UL (ref 1–4.8)
LYMPHOCYTES NFR BLD: 12.6 % (ref 18–48)
MAGNESIUM SERPL-MCNC: 2.1 MG/DL (ref 1.6–2.6)
MCH RBC QN AUTO: 28.9 PG (ref 27–31)
MCHC RBC AUTO-ENTMCNC: 33.5 G/DL (ref 32–36)
MCV RBC AUTO: 86 FL (ref 82–98)
MONOCYTES # BLD AUTO: 1.2 K/UL (ref 0.3–1)
MONOCYTES NFR BLD: 12 % (ref 4–15)
NEUTROPHILS # BLD AUTO: 7.2 K/UL (ref 1.8–7.7)
NEUTROPHILS NFR BLD: 74.4 % (ref 38–73)
NRBC BLD-RTO: 0 /100 WBC
PHOSPHATE SERPL-MCNC: 4.2 MG/DL (ref 2.7–4.5)
PLATELET # BLD AUTO: 314 K/UL (ref 150–450)
PMV BLD AUTO: 9.2 FL (ref 9.2–12.9)
POTASSIUM SERPL-SCNC: 3.7 MMOL/L (ref 3.5–5.1)
RBC # BLD AUTO: 4.05 M/UL (ref 4–5.4)
SODIUM SERPL-SCNC: 142 MMOL/L (ref 136–145)
WBC # BLD AUTO: 9.65 K/UL (ref 3.9–12.7)

## 2024-01-01 PROCEDURE — 36415 COLL VENOUS BLD VENIPUNCTURE: CPT | Performed by: PHYSICIAN ASSISTANT

## 2024-01-01 PROCEDURE — 25000003 PHARM REV CODE 250: Performed by: PHYSICIAN ASSISTANT

## 2024-01-01 PROCEDURE — 25000003 PHARM REV CODE 250: Performed by: FAMILY MEDICINE

## 2024-01-01 PROCEDURE — 80048 BASIC METABOLIC PNL TOTAL CA: CPT | Performed by: PHYSICIAN ASSISTANT

## 2024-01-01 PROCEDURE — 84100 ASSAY OF PHOSPHORUS: CPT | Performed by: PHYSICIAN ASSISTANT

## 2024-01-01 PROCEDURE — 83735 ASSAY OF MAGNESIUM: CPT | Performed by: PHYSICIAN ASSISTANT

## 2024-01-01 PROCEDURE — 83036 HEMOGLOBIN GLYCOSYLATED A1C: CPT | Performed by: PHYSICIAN ASSISTANT

## 2024-01-01 PROCEDURE — G0378 HOSPITAL OBSERVATION PER HR: HCPCS

## 2024-01-01 PROCEDURE — 25000003 PHARM REV CODE 250

## 2024-01-01 PROCEDURE — 99223 1ST HOSP IP/OBS HIGH 75: CPT | Mod: ,,, | Performed by: INTERNAL MEDICINE

## 2024-01-01 PROCEDURE — 96372 THER/PROPH/DIAG INJ SC/IM: CPT | Performed by: PHYSICIAN ASSISTANT

## 2024-01-01 PROCEDURE — 63600175 PHARM REV CODE 636 W HCPCS: Performed by: PHYSICIAN ASSISTANT

## 2024-01-01 PROCEDURE — 85025 COMPLETE CBC W/AUTO DIFF WBC: CPT | Performed by: PHYSICIAN ASSISTANT

## 2024-01-01 RX ORDER — MORPHINE SULFATE 15 MG/1
15 TABLET ORAL EVERY 6 HOURS PRN
Status: DISCONTINUED | OUTPATIENT
Start: 2024-01-01 | End: 2024-01-07 | Stop reason: HOSPADM

## 2024-01-01 RX ORDER — MAGNESIUM SULFATE 1 G/100ML
1 INJECTION INTRAVENOUS ONCE
Status: COMPLETED | OUTPATIENT
Start: 2024-01-01 | End: 2024-01-01

## 2024-01-01 RX ORDER — NADOLOL 40 MG/1
40 TABLET ORAL DAILY
Status: DISCONTINUED | OUTPATIENT
Start: 2024-01-01 | End: 2024-01-02

## 2024-01-01 RX ORDER — DIPHENHYDRAMINE HYDROCHLORIDE 50 MG/ML
25 INJECTION INTRAMUSCULAR; INTRAVENOUS ONCE
Status: COMPLETED | OUTPATIENT
Start: 2024-01-01 | End: 2024-01-01

## 2024-01-01 RX ORDER — MORPHINE SULFATE 15 MG/1
30 TABLET ORAL EVERY 6 HOURS PRN
Status: DISCONTINUED | OUTPATIENT
Start: 2024-01-01 | End: 2024-01-07 | Stop reason: HOSPADM

## 2024-01-01 RX ADMIN — DRONABINOL 5 MG: 2.5 CAPSULE ORAL at 09:01

## 2024-01-01 RX ADMIN — PROCHLORPERAZINE EDISYLATE 5 MG: 5 INJECTION INTRAMUSCULAR; INTRAVENOUS at 02:01

## 2024-01-01 RX ADMIN — GABAPENTIN 300 MG: 300 CAPSULE ORAL at 08:01

## 2024-01-01 RX ADMIN — ENOXAPARIN SODIUM 40 MG: 40 INJECTION SUBCUTANEOUS at 04:01

## 2024-01-01 RX ADMIN — HYDROMORPHONE HYDROCHLORIDE 0.5 MG: 1 INJECTION, SOLUTION INTRAMUSCULAR; INTRAVENOUS; SUBCUTANEOUS at 04:01

## 2024-01-01 RX ADMIN — PANTOPRAZOLE SODIUM 40 MG: 40 TABLET, DELAYED RELEASE ORAL at 09:01

## 2024-01-01 RX ADMIN — HYDROMORPHONE HYDROCHLORIDE 0.5 MG: 1 INJECTION, SOLUTION INTRAMUSCULAR; INTRAVENOUS; SUBCUTANEOUS at 02:01

## 2024-01-01 RX ADMIN — DIPHENHYDRAMINE HYDROCHLORIDE 25 MG: 50 INJECTION, SOLUTION INTRAMUSCULAR; INTRAVENOUS at 12:01

## 2024-01-01 RX ADMIN — PANTOPRAZOLE SODIUM 40 MG: 40 TABLET, DELAYED RELEASE ORAL at 08:01

## 2024-01-01 RX ADMIN — CLONAZEPAM 1 MG: 0.5 TABLET ORAL at 08:01

## 2024-01-01 RX ADMIN — MIRTAZAPINE 30 MG: 15 TABLET, ORALLY DISINTEGRATING ORAL at 08:01

## 2024-01-01 RX ADMIN — PROCHLORPERAZINE EDISYLATE 5 MG: 5 INJECTION INTRAMUSCULAR; INTRAVENOUS at 04:01

## 2024-01-01 RX ADMIN — DRONABINOL 5 MG: 2.5 CAPSULE ORAL at 04:01

## 2024-01-01 RX ADMIN — MAGNESIUM SULFATE HEPTAHYDRATE 1 G: 500 INJECTION, SOLUTION INTRAMUSCULAR; INTRAVENOUS at 12:01

## 2024-01-01 RX ADMIN — GABAPENTIN 300 MG: 300 CAPSULE ORAL at 09:01

## 2024-01-01 RX ADMIN — MORPHINE SULFATE 30 MG: 15 TABLET ORAL at 08:01

## 2024-01-01 RX ADMIN — THERA TABS 1 TABLET: TAB at 09:01

## 2024-01-01 RX ADMIN — NADOLOL 40 MG: 40 TABLET ORAL at 11:01

## 2024-01-01 RX ADMIN — SODIUM CHLORIDE, POTASSIUM CHLORIDE, SODIUM LACTATE AND CALCIUM CHLORIDE: 600; 310; 30; 20 INJECTION, SOLUTION INTRAVENOUS at 04:01

## 2024-01-01 RX ADMIN — CLONAZEPAM 1 MG: 0.5 TABLET ORAL at 09:01

## 2024-01-01 RX ADMIN — OXYCODONE HYDROCHLORIDE 5 MG: 5 TABLET ORAL at 11:01

## 2024-01-01 NOTE — NURSING
Pt progressing toward goals. No distress noted. No falls or injuries during shift. Pt bed in lowest position. Side rails x2. Call bell and personal belongs within reach. Safety precautions maintained.

## 2024-01-01 NOTE — PROGRESS NOTES
[facility] Observation [address] [place] [address] Medicine  Progress Note    Patient Name: Ivy Salgado  MRN: [id number]  Patient Class: OP- Observation   Admission Date: 12/31/2023  Length of Stay: 0 days  Attending Physician: Monique Mckeon MD  Primary Care Provider: Luis Madden DO        Subjective:     Principal Problem:Crohn's disease of small intestine with complication        HPI:  Ivy Salgado is a 43 y.o. female with a PMHx of ARPITA, Crohn's s/p ileostomy on Entyvio who presents to [facility] for evaluation of abdominal pain. Patient reprots intermittnet severe generalized abdominal pain for the past 4 days. Pain is worse on her right side and occasionally radiates to her flank. Endorses associated increased ostomy output, poor appetite, and nausea without any vomiting. She feels flushed and had increased temp at home but no fever (reported Tmax 100.2). Symptoms are consistent with prior Crohn's flares, has not had a flare since 2020.  Denies dark/bloody stools, HA, vision changes, chest pain, SOB, LE swelling or syncope.    ED: AFVSS. No leukocytosis or electrolyte abnormalities. ESR 94, CRP 22.8. UA noninfectious. CT abd/pelvis shows slow flow through a few mid to distal small bowel loops noting venous vascular engorgement about these loops and wall hyperemia concerning for inflammatory enteritis. ED provider spoke with GI who recommend holding antibiotics for now and NPO at midnight for potential scope in AM. Given IV dilaudid 1mg x2 and IV compazine.     Overview/Hospital Course:  Ms. Salgado was placed in observation for inflammatory enteritis and acute crohn's flare. C. Diff negative, remaining stool cultures pending. GI consulted.     Interval History: Pt seen and examined by me this morning. SORAIDA CACERES. Pt reports persistent abdominal pain (R>L) that is controlled on current regimen. GI consulted. NPO at midnight for likely scope in the am. Formal note to follow.      Review of Systems   Constitutional:   Positive for appetite change. Negative for chills, fatigue and fever.   HENT:  Negative for trouble swallowing and voice change.    Eyes:  Negative for photophobia and visual disturbance.   Respiratory:  Negative for cough, chest tightness, shortness of breath and wheezing.    Cardiovascular:  Negative for chest pain, palpitations and leg swelling.   Gastrointestinal:  Positive for abdominal pain, diarrhea and nausea. Negative for blood in stool, constipation and vomiting.   Genitourinary:  Negative for difficulty urinating, dysuria, frequency, hematuria and urgency.   Musculoskeletal:  Negative for arthralgias, back pain and gait problem.   Skin:  Negative for color change and rash.   Neurological:  Negative for dizziness, seizures, speech difficulty, weakness, light-headedness and headaches.   Psychiatric/Behavioral:  Negative for behavioral problems, confusion and decreased concentration.      Objective:     Vital Signs (Most Recent):  Temp: 98.5 °F (36.9 °C) (01/01/24 1143)  Pulse: 68 (01/01/24 1507)  Resp: 20 (01/01/24 1143)  BP: 107/60 (01/01/24 1143)  SpO2: 97 % (01/01/24 1143) Vital Signs (24h Range):  Temp:  [97.4 °F (36.3 °C)-99.6 °F (37.6 °C)] 98.5 °F (36.9 °C)  Pulse:  [68-99] 68  Resp:  [12-20] 20  SpO2:  [95 %-99 %] 97 %  BP: (102-114)/(55-81) 107/60     Weight: 50.9 kg (112 lb 3.2 oz)  Body mass index is 21.2 kg/m².    Intake/Output Summary (Last 24 hours) at 1/1/2024 1622  Last data filed at 1/1/2024 0250  Gross per 24 hour   Intake 1000 ml   Output 675 ml   Net 325 ml         Physical Exam  Vitals and nursing note reviewed.   Constitutional:       General: She is not in acute distress.     Appearance: She is well-developed.   HENT:      Head: Normocephalic and atraumatic.      Mouth/Throat:      Pharynx: No oropharyngeal exudate.   Eyes:      General: No scleral icterus.     Conjunctiva/sclera: Conjunctivae normal.   Cardiovascular:      Rate and Rhythm: Normal rate and regular rhythm.      Heart  sounds: Normal heart sounds.   Pulmonary:      Effort: Pulmonary effort is normal. No respiratory distress.      Breath sounds: Normal breath sounds. No wheezing.   Abdominal:      General: There is no distension.      Palpations: Abdomen is soft.      Tenderness: There is abdominal tenderness.      Comments: Bowel sounds decreased throughout. Ileostomy in place without surrounding erythema or drainage   Musculoskeletal:         General: No tenderness. Normal range of motion.      Cervical back: Normal range of motion and neck supple.   Lymphadenopathy:      Cervical: No cervical adenopathy.   Skin:     General: Skin is warm and dry.      Capillary Refill: Capillary refill takes less than 2 seconds.      Findings: No rash.   Neurological:      Mental Status: She is alert and oriented to person, place, and time.      Cranial Nerves: No cranial nerve deficit.      Sensory: No sensory deficit.      Coordination: Coordination normal.   Psychiatric:         Behavior: Behavior normal.         Thought Content: Thought content normal.         Judgment: Judgment normal.             Significant Labs: All pertinent labs within the past 24 hours have been reviewed.    Significant Imaging: I have reviewed all pertinent imaging results/findings within the past 24 hours.    Assessment/Plan:      * Crohn's disease of small intestine with complication  - presentation consistent with Crohn's flare  - afebrile without leukocytosis  - inflammatory makers elevated  - CT abd/pelvis shows inflammatory enteritis without abscess or obstruction   - GI consulted in the ED, rec holding on IV steroids for now with tentative plan for scope in AM  - NPO at MN  - stool, blood cx pending  - cIVFs overnight  - pain and nausea control   - hold on antibiotics for now unless becomes febrile or shows other signs of sepsis    Generalized anxiety disorder  - continue klonopin, remeron, and propanolol     S/P ileostomy  - routine ostomy care      VTE Risk  Mitigation (From admission, onward)           Ordered     enoxaparin injection 40 mg  Daily         12/31/23 1912     IP VTE LOW RISK PATIENT  Once         12/31/23 1912     Place sequential compression device  Until discontinued         12/31/23 1912                    Discharge Planning   CHRISTOPH: 1/2/2024     Code Status: Full Code   Is the patient medically ready for discharge?: No    Reason for patient still in hospital (select all that apply): Patient trending condition, Treatment, Imaging, and Consult recommendations                     Candice Lee PA-C  Department of Hospital Medicine   [facility] - Observation 11H

## 2024-01-01 NOTE — SUBJECTIVE & OBJECTIVE
Past Medical History:   Diagnosis Date    Abnormal Pap smear 2007    Abnormal Pap smear 5/26/2011    Anemia     Anxiety     Arthritis     C. difficile diarrhea     Crohn's disease     Depression 8/5/2017    Encounter for blood transfusion     Genital HSV     History of colposcopy with cervical biopsy 2007 and 7/2011 2007-LYLA I  and 7/2011- LYLA I    Hypertension     Kidney stone     Kidney stone     Melanoma     Recurrent UTI 4/3/2013    S/P ileostomy 7/9/2012    Sterilization 6/23/2012       Past Surgical History:   Procedure Laterality Date    ABDOMINAL SURGERY      APPENDECTOMY      ARTHROPLASTY OF SHOULDER Left 7/18/2023    Procedure: ARTHROPLASTY, SHOULDER;  Surgeon: Sharon Babb MD;  Location: [facility];  Service: Orthopedics;  Laterality: Left;    AUGMENTATION OF BREAST Bilateral 06/2022    gel implants    BILATERAL SALPINGO-OOPHORECTOMY (BSO) Bilateral 05/30/2019    Procedure: SALPINGO-OOPHORECTOMY, BILATERAL;  Surgeon: Rupa German MD;  Location: [facility] OR [address] [place] [address];  Service: OB/GYN;  Laterality: Bilateral;    BLADDER SURGERY      partial cystectomy due to fistula    breast lift      BREAST SURGERY      [facility]      COLON SURGERY      COLONOSCOPY      CYSTOSCOPY  09/23/2020    Procedure: CYSTOSCOPY;  Surgeon: Sascha Florentino MD;  Location: [facility] OR [address] [place] [address];  Service: Urology;;    CYSTOSCOPY W/ URETERAL STENT PLACEMENT Left 09/15/2020    Procedure: CYSTOSCOPY, WITH URETERAL STENT INSERTION;  Surgeon: Sascha Florentino MD;  Location: [facility] OR [address] [place] [address];  Service: Urology;  Laterality: Left;    DIAGNOSTIC LAPAROSCOPY N/A 07/09/2020    Procedure: LAPAROSCOPY, DIAGNOSTIC;  Surgeon: Gilson El MD;  Location: [facility] OR;  Service: OB/GYN;  Laterality: N/A;    EXCISION OF MELANOMA  07/17/2019    ILEOSTOMY      LAPAROSCOPIC LYSIS OF ADHESIONS N/A 07/09/2020    Procedure: LYSIS, ADHESIONS, LAPAROSCOPIC;  Surgeon: Gilson El MD;  Location: [facility] OR;  Service: OB/GYN;  Laterality: N/A;    LASER LITHOTRIPSY   09/23/2020    Procedure: LITHOTRIPSY, USING LASER;  Surgeon: Sascha Florentino MD;  Location: [facility] OR 1ST FLR;  Service: Urology;;    LYSIS OF ADHESIONS N/A 05/30/2019    Procedure: LYSIS, ADHESIONS;  Surgeon: Rupa German MD;  Location: [facility] OR 2ND FLR;  Service: OB/GYN;  Laterality: N/A;    OOPHORECTOMY Right 04/16/2015    PORTACATH PLACEMENT  02/21/2017    SKIN BIOPSY      SMALL INTESTINE SURGERY      age 18 Y    TOTAL ABDOMINAL HYSTERECTOMY  04/16/2015    TOTAL COLECTOMY      TUBAL LIGATION  06/06/2012    UPPER GASTROINTESTINAL ENDOSCOPY      URETEROSCOPIC REMOVAL OF URETERIC CALCULUS  09/23/2020    Procedure: REMOVAL, CALCULUS, URETER, URETEROSCOPIC;  Surgeon: Sascha Florentino MD;  Location: [facility] OR 1ST FLR;  Service: Urology;;    URETEROSCOPY Left 09/23/2020    Procedure: URETEROSCOPY;  Surgeon: Sascha Florentino MD;  [facility];  Service: Urology;  Laterality: Left;       Review of patient's allergies indicates:   Allergen Reactions    Azathioprine sodium Other (See Comments)     Other reaction(s): pancreatitis  Other reaction(s): pancreatitis    Methotrexate analogues Other (See Comments)     leukopenia    Stelara [ustekinumab] Other (See Comments)     Multiple infections    Zofran [ondansetron hcl (pf)] Other (See Comments)     Per patient causes prolong QT    Vancomycin analogues Other (See Comments)     Made her red    Azathioprine      Other reaction(s): Unknown    Methotrexate      Other reaction(s): infection-    Morphine Itching and Other (See Comments)     Other reaction(s): Itching    Zofran [ondansetron hcl]      Other reaction(s): Hives    Bactrim [sulfamethoxazole-trimethoprim] Palpitations    Ciprofloxacin Palpitations       Current Facility-Administered Medications on File Prior to Encounter   Medication    estradiol valerate (DELESTROGEN) injection 20 mg/mL    estradiol valerate injection 20 mg     Current Outpatient Medications on File Prior to Encounter    Medication Sig    clonazePAM (KLONOPIN) 1 MG tablet Take 1 tablet (1 mg total) by mouth 2 (two) times daily.    cyclobenzaprine (FLEXERIL) 10 MG tablet Take 1 tablet (10 mg total) by mouth every evening as needed for muscle pain    droNABinol (MARINOL) 5 MG capsule Take 1 capsule (5 mg total) by mouth 2 (two) times daily before meals.    estradioL (ESTRACE) 0.01 % (0.1 mg/gram) vaginal cream Place 1 g vaginally once daily.    flu vacc [id number] 6mos up,PF, (FLUARIX QUAD [id number], PF,) 60 mcg (15 mcg x 4)/0.5 mL Syrg inject into muscle for one dose    fluconazole (DIFLUCAN) 150 MG Tab Take 1 tablet (150 mg total) by mouth every 72 hours as needed (vaginal yeast).    gabapentin (NEURONTIN) 300 MG capsule Take 1 capsule (300 mg total) by mouth 2 (two) times daily.    HYDROcodone-acetaminophen (NORCO) 7.5-325 mg per tablet Take 1 tablet by mouth every 24 hours as needed for Pain.    loperamide (IMODIUM) 2 mg capsule Take 2 mg by mouth daily as needed for Diarrhea.    mirtazapine (REMERON SOL-TAB) 30 MG disintegrating tablet Take 1 tablet (30 mg total) by mouth nightly.    multivitamin (THERAGRAN) per tablet Take 1 tablet by mouth once daily.    nadoloL (CORGARD) 40 MG tablet Take 1 tablet (40 mg total) by mouth once daily. Patient needs appointment before next refill.    pantoprazole (PROTONIX) 40 MG tablet Take 1 tablet (40 mg total) by mouth 2 (two) times daily.    promethazine (PHENERGAN) 25 MG tablet TAKE 1 TABLET BY MOUTH EVERY 8 HOURS FOR NAUSEA    tamsulosin (FLOMAX) 0.4 mg Cap Take 1 capsule (0.4 mg total) by mouth once daily.    triamcinolone acetonide 0.1% (KENALOG) 0.1 % cream Apply topically 2 (two) times daily.    valACYclovir (VALTREX) 500 MG tablet Take 1 tablet (500 mg total) by mouth once daily.    vedolizumab (ENTYVIO) 300 mg SolR injection Inject 300 mg into the vein.    zolpidem (AMBIEN) 10 mg Tab Take 1 tablet (10 mg total) by mouth every evening.     Family History       Problem Relation (Age of  Onset)    Breast cancer Maternal Cousin (41)    Cancer Maternal Grandfather, Father    Colon cancer Father    Crohn's disease Brother, Daughter    Diabetes Paternal Grandfather, Maternal Grandfather    Endometrial cancer Maternal Aunt    Hearing loss Paternal Grandmother    Heart disease Maternal Grandfather    Hyperlipidemia Father    Hypertension Mother    Liver cancer Father    Skin cancer Maternal Grandfather          Tobacco Use    Smoking status: Never    Smokeless tobacco: Never   Substance and Sexual Activity    Alcohol use: Not Currently     Alcohol/week: 0.0 standard drinks of alcohol    Drug use: No    Sexual activity: Yes     Partners: Male     Birth control/protection: See Surgical Hx     Comment: HYST     Review of Systems   Constitutional:  Positive for appetite change. Negative for chills, fatigue and fever.   HENT:  Negative for trouble swallowing and voice change.    Eyes:  Negative for photophobia and visual disturbance.   Respiratory:  Negative for cough, chest tightness, shortness of breath and wheezing.    Cardiovascular:  Negative for chest pain, palpitations and leg swelling.   Gastrointestinal:  Positive for abdominal pain, diarrhea and nausea. Negative for blood in stool, constipation and vomiting.   Genitourinary:  Negative for difficulty urinating, dysuria, frequency, hematuria and urgency.   Musculoskeletal:  Negative for arthralgias, back pain and gait problem.   Skin:  Negative for color change and rash.   Neurological:  Negative for dizziness, seizures, speech difficulty, weakness, light-headedness and headaches.   Psychiatric/Behavioral:  Negative for behavioral problems, confusion and decreased concentration.      Objective:     Vital Signs (Most Recent):  Temp: 98.9 °F (37.2 °C) (12/31/23 1944)  Pulse: 87 (12/31/23 1940)  Resp: 16 (12/31/23 1653)  BP: 114/81 (12/31/23 1941)  SpO2: 98 % (12/31/23 1940) Vital Signs (24h Range):  Temp:  [98.9 °F (37.2 °C)-99.9 °F (37.7 °C)] 98.9 °F  (37.2 °C)  Pulse:  [78-89] 87  Resp:  [12-18] 16  SpO2:  [95 %-99 %] 98 %  BP: (107-136)/(67-81) 114/81     Weight: 52.2 kg (115 lb)  Body mass index is 21.73 kg/m².     Physical Exam  Vitals and nursing note reviewed.   Constitutional:       General: She is not in acute distress.     Appearance: She is well-developed.   HENT:      Head: Normocephalic and atraumatic.      Mouth/Throat:      Pharynx: No oropharyngeal exudate.   Eyes:      General: No scleral icterus.     Conjunctiva/sclera: Conjunctivae normal.   Cardiovascular:      Rate and Rhythm: Normal rate and regular rhythm.      Heart sounds: Normal heart sounds.   Pulmonary:      Effort: Pulmonary effort is normal. No respiratory distress.      Breath sounds: Normal breath sounds. No wheezing.   Abdominal:      General: There is no distension.      Palpations: Abdomen is soft.      Tenderness: There is abdominal tenderness.      Comments: Bowel sounds decreased throughout. Ileostomy in place without surrounding erythema or drainage   Musculoskeletal:         General: No tenderness. Normal range of motion.      Cervical back: Normal range of motion and neck supple.   Lymphadenopathy:      Cervical: No cervical adenopathy.   Skin:     General: Skin is warm and dry.      Capillary Refill: Capillary refill takes less than 2 seconds.      Findings: No rash.   Neurological:      Mental Status: She is alert and oriented to person, place, and time.      Cranial Nerves: No cranial nerve deficit.      Sensory: No sensory deficit.      Coordination: Coordination normal.   Psychiatric:         Behavior: Behavior normal.         Thought Content: Thought content normal.         Judgment: Judgment normal.                Significant Labs: All pertinent labs within the past 24 hours have been reviewed.  CBC:   Recent Labs   Lab 12/31/23  1651   WBC 9.66   HGB 12.2   HCT 36.8*        CMP:   Recent Labs   Lab 12/31/23  1651      K 3.8      CO2 24   GLU 97    BUN 9   CREATININE 0.6   CALCIUM 9.9   PROT 8.0   ALBUMIN 3.3*   BILITOT 0.4   ALKPHOS 175*   AST 30   ALT 41   ANIONGAP 13       Significant Imaging: I have reviewed all pertinent imaging results/findings within the past 24 hours.  CT Abdomen Pelvis With IV Contrast NO Oral Contrast  Narrative: EXAMINATION:  CT ABDOMEN PELVIS WITH IV CONTRAST    CLINICAL HISTORY:  Abdominal pain, acute, nonlocalized;    TECHNIQUE:  Low dose axial images, sagittal and coronal reformations were obtained from the lung bases to the pubic symphysis following the IV administration of 75 mL of Omnipaque 350 no or    COMPARISON:  CT 10/24/2023    FINDINGS:  Images of the lower thorax are remarkable for bilateral dependent atelectasis.    The liver is enlarged, correlation with LFTs recommended.  The spleen, pancreas, gallbladder and adrenal glands are grossly unremarkable.  There is no biliary dilation or ascites.  The portal vein, splenic vein, SMV, celiac axis and SMA all are patent.  There are a few scattered upper limit of normal caliber abdominal lymph nodes.    The kidneys enhance symmetrically without hydronephrosis or nephrolithiasis.  Subcentimeter low attenuating lesions arise from the kidneys bilaterally, too small for characterization.  The bilateral ureters are unable to be followed in their entirety to the urinary bladder, no definite calculi seen or secondary findings to suggest obstructive uropathy.  There is decompression of a few scattered mid small bowel loops noting distension and slow flow of the distal small bowel and associated wall hyperemia.  The ileostomy appears patent there is scattered interloop fluid and venous vascular engorgement involving the distended bowel loops, no findings to suggest abscess.  Several enlarged central mesenteric lymph nodes noted.    There are degenerative changes of the spine.  There are bilateral breast prostheses.  There is dystrophic calcification within the right gluteal soft  tissues.  No significant inguinal lymphadenopathy.  Impression: This report was flagged in Epic as abnormal.    1. Slow flow through a few mid to distal small bowel loops noting venous vascular engorgement about these loops and wall hyperemia concerning for inflammatory enteritis in this patient with history of inflammatory bowel disease.  No high-grade obstruction at this time.  No findings to suggest abscess.  2. Surgical changes of partial small-bowel resection, colectomy, and right quadrant ileostomy without obstruction.  3. Hepatomegaly, correlation with LFTs recommended.  4. Please see above for several additional findings.    Electronically signed by: Kelby Solano MD  Date:    12/31/2023  Time:    18:42

## 2024-01-01 NOTE — ASSESSMENT & PLAN NOTE
- presentation consistent with Crohn's flare  - afebrile without leukocytosis  - inflammatory makers elevated  - CT abd/pelvis shows inflammatory enteritis without abscess or obstruction   - GI consulted in the ED, rec holding on IV steroids for now with tentative plan for scope in AM  - NPO at MN  - stool, blood cx pending  - cIVFs overnight  - pain and nausea control   - hold on antibiotics for now unless becomes febrile or shows other signs of sepsis

## 2024-01-01 NOTE — SUBJECTIVE & OBJECTIVE
Interval History: Pt seen and examined by me this morning. SORAIDA CACERES. Pt reports persistent abdominal pain (R>L) that is controlled on current regimen. GI consulted. NPO at midnight for likely scope in the am. Formal note to follow.      Review of Systems   Constitutional:  Positive for appetite change. Negative for chills, fatigue and fever.   HENT:  Negative for trouble swallowing and voice change.    Eyes:  Negative for photophobia and visual disturbance.   Respiratory:  Negative for cough, chest tightness, shortness of breath and wheezing.    Cardiovascular:  Negative for chest pain, palpitations and leg swelling.   Gastrointestinal:  Positive for abdominal pain, diarrhea and nausea. Negative for blood in stool, constipation and vomiting.   Genitourinary:  Negative for difficulty urinating, dysuria, frequency, hematuria and urgency.   Musculoskeletal:  Negative for arthralgias, back pain and gait problem.   Skin:  Negative for color change and rash.   Neurological:  Negative for dizziness, seizures, speech difficulty, weakness, light-headedness and headaches.   Psychiatric/Behavioral:  Negative for behavioral problems, confusion and decreased concentration.      Objective:     Vital Signs (Most Recent):  Temp: 98.5 °F (36.9 °C) (01/01/24 1143)  Pulse: 68 (01/01/24 1507)  Resp: 20 (01/01/24 1143)  BP: 107/60 (01/01/24 1143)  SpO2: 97 % (01/01/24 1143) Vital Signs (24h Range):  Temp:  [97.4 °F (36.3 °C)-99.6 °F (37.6 °C)] 98.5 °F (36.9 °C)  Pulse:  [68-99] 68  Resp:  [12-20] 20  SpO2:  [95 %-99 %] 97 %  BP: (102-114)/(55-81) 107/60     Weight: 50.9 kg (112 lb 3.2 oz)  Body mass index is 21.2 kg/m².    Intake/Output Summary (Last 24 hours) at 1/1/2024 1622  Last data filed at 1/1/2024 0250  Gross per 24 hour   Intake 1000 ml   Output 675 ml   Net 325 ml         Physical Exam  Vitals and nursing note reviewed.   Constitutional:       General: She is not in acute distress.     Appearance: She is well-developed.    HENT:      Head: Normocephalic and atraumatic.      Mouth/Throat:      Pharynx: No oropharyngeal exudate.   Eyes:      General: No scleral icterus.     Conjunctiva/sclera: Conjunctivae normal.   Cardiovascular:      Rate and Rhythm: Normal rate and regular rhythm.      Heart sounds: Normal heart sounds.   Pulmonary:      Effort: Pulmonary effort is normal. No respiratory distress.      Breath sounds: Normal breath sounds. No wheezing.   Abdominal:      General: There is no distension.      Palpations: Abdomen is soft.      Tenderness: There is abdominal tenderness.      Comments: Bowel sounds decreased throughout. Ileostomy in place without surrounding erythema or drainage   Musculoskeletal:         General: No tenderness. Normal range of motion.      Cervical back: Normal range of motion and neck supple.   Lymphadenopathy:      Cervical: No cervical adenopathy.   Skin:     General: Skin is warm and dry.      Capillary Refill: Capillary refill takes less than 2 seconds.      Findings: No rash.   Neurological:      Mental Status: She is alert and oriented to person, place, and time.      Cranial Nerves: No cranial nerve deficit.      Sensory: No sensory deficit.      Coordination: Coordination normal.   Psychiatric:         Behavior: Behavior normal.         Thought Content: Thought content normal.         Judgment: Judgment normal.             Significant Labs: All pertinent labs within the past 24 hours have been reviewed.    Significant Imaging: I have reviewed all pertinent imaging results/findings within the past 24 hours.

## 2024-01-01 NOTE — ASSESSMENT & PLAN NOTE
- presentation consistent with Crohn's flare  - afebrile without leukocytosis  - inflammatory makers elevated  - CT abd/pelvis shows inflammatory enteritis without abscess or obstruction   - GI consulted in the ED, rec holding on IV steroids for now with tentative plan for scope in AM  - NPO at MN  - hold on antibiotics for now unless becomes febrile or shows other signs of sepsis  - stool, blood cx pending  - cIVFs overnight  - pain and nausea control with PRN meds

## 2024-01-01 NOTE — ED NOTES
Telemetry Verification   Patient placed on Telemetry Box  Verified with War Room  Box # [id number]   Monitor Tech  [email or web address]   Rate  87   Rhythm  NSR

## 2024-01-01 NOTE — HOSPITAL COURSE
Ms. Salgado was admitted to hospital medicine for inflammatory enteritis and suspected acute crohn's flare. C. Diff negative, stool culture without significant growth to date. GI consulted and performed EGD and ileoscopy. EGD with erythematous mucosa in the prepyloric region of the stomach. Ileoscopy revealed ulcers in the distal ileum. Biopsies pending. Abdominal U/S revealed mildly dilated bile ducts in the central right hepatic lobe and the patient has uptrending transaminitis without elevated T reji. 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 Hepatology consulted: awaiting serology results. Pt with soft pressures - giving gentle IVFs with improvement. Pt output and pain well controlled. GI with plans to follow up with patient in clinic and restart entyvio. Patient medically ready for discharge. Plan to follow up with PCP, GI, hepatology. Return precautions provided. Plan of care discussed with patient, patient agreeable with plan, and all questions answered.

## 2024-01-01 NOTE — CONSULTS
[facility]  Gastroenterology  Consult Note    Patient Name: Ivy Salgado  MRN: [id number]  Admission Date: 12/31/2023  Hospital Length of Stay: 0 days  Code Status: Full Code   Attending Provider: Monique Mckeon MD   Consulting Provider: Beronica Holguin MD  Primary Care Physician: Luis Madden DO  Principal Problem:Crohn's disease of small intestine with complication    Inpatient consult to Gastroenterology  Consult performed by: Beronica Holguin MD  Consult ordered by: Shelby Chavez MD        Subjective:     HPI: Ivy Salgado is a 43 y.o. 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 GI consulted for concerns for an IBD flare. The patient reports that she has been more or less doing well off all IBD therapies for the past 4 years and was last seen in clinic by her GI, Dr. Ross in 7/2023. At her baseline, she will have to change her bag 2-3 times per day with no night time awakenings. She started to notice over the course of the last 1-2 weeks that her output had started to increase and that the appearance of the output was more flaky. She denies any bloody output from her bag. She has noted that she will now have to change her bag 5-6 times in a 24/hr period and that 2-3 of those will be at night time- awakening her with  either pain or bloating sensation prompting her to note her bag has refilled. She has not been taking any therapies except for Marinol (which is chronic home med for her) and Loperamide- max 2 mg BID. She notes that she periodically uses Imodium if she is going to go somewhere to control her output, but over the last week it has not been prn, but more a regular 2 mg BID dosing for her. Despite this, her output has still been increased. She notes associated nausea without vomiting, chills, bloating, reflux and dyspepsia type symptoms, prompting her to resume a prn  PPI. She does not report a fever. 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 Prescribed Marinol by GI.     Surgical History:   1992- Unclear, colon resection to address fistulous disease process  1998- Unclear, colon resection to address abscess   2012- S/p total proctocolectomy with end ileostomy, post-op course complicated by perineal wound which she followed with [facility] for ~ 1 year (Dr. 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     Family History:   Colon Cancer- Father,  diagnosed in 40's, s/p resection and chemo   IBD- Brother diagnosed with Crohn's at age 18 and daughter recently diagnosed with Crohn's at age 15; recent psych notes with noted depression 2/2 daughter's diagnosis, daughter is currently doing well on Humira q4 weeks     Previous Crohn's Therapies:   Entyvio/Vedolizumab- most recent therapy, was on this from [id number] and then stopped as she was getting recurrent UTI's/pyelonephritis   Stelara/Ustekinumab- was on this prior to Entyvio and had multiple infections   Cimzia/Certolizumab- stopped working after some years, unclear on dates, records note she was on this in 2016   Remicade/Inflixmab- stopped working after many years, unsure about antibodies   Humira/Adalimumab- Suspected drug-induced lupus due to joint pains  Prednisone tapers- effective, caused avascular necrosis to her left shoulder requiring arthroplasty   Imuran/Azathioprine- stopped due to drug-induced pancreatitis   Methotrexate- stopped due to significant leukopenia  Sulfasalazine at some point prior to   Entocort- at some point prior to    Canasa- at some point prior to     Adjunctive Medical Therapies:  Marinol- prescribed by Dr. Ross, effective  Mirtazapine- for ARPITA/Depression, somewhat effective  Wellbutrin- recently tried after seeing psych, but switched back as it caused increased output from her ostomy   Octreotide- at some point received this in the mid  for increased output, not currently     Imagin23 CT A/P with contrast-  There is decompression of a few scattered mid small bowel loops noting distension and slow flow of the distal small bowel and associated wall hyperemia.  The ileostomy appears patent there is scattered interloop fluid and venous vascular engorgement involving the distended bowel loops, no findings to suggest abscess.     2021 MRE-no active inflammation.    Endoscopies:   2017- Ileoscopy- The examined portion of the ileum was normal.    Biopsied. Widely patent end ileostomy with healthy appearing mucosa at the ileostomy. Pathology showed fragments of unremarkable small bowel mucosa. No evidence of active colitis, granuloma, dysplasia or malignancy  10/2016- EGD- Normal esophagus. Small hiatus hernia. Erythematous mucosa in the gastric body. Biopsied. Trace of hematin in the gastric body.   Normal examined duodenum.  Two biopsies were obtained in the duodenal bulb. Four biopsies were obtained in the 2nd part of the duodenum.   10/2016- Ileoscopy- The examined portion of the ileum was normal. Biopsies without any acute abnormalities- CMV, HSV-1 AND HSV-2 stains performed. No celiac. No. H pylori. Normal mucosa of the small bowel.   10/2014- EGD- Normal Study. Biopsied to r/o Crohn's.    10/2014- Ileoscopy- The examined portion of the ileum was normal. Biopsies normal.  2/2014- SBE- A single (solitary) ulcer in the proximal ileum. Biopsied. Mild non-specific enteritis.   9/2013- Ileoscopy- Congested, eroded and ulcerated mucosa in the area at 5 cm proximal to the stoma. Biopsied. The examined portion of the ileum was normal otherwise up to 30 cm. Biopsied. FRAGMENTS OF NONNEOPLASTIC SMALL INTESTINAL MUCOSA WITH NO ACTIVE INFLAMMATION, ULCERATION OR DYSPLASIA PRESENT. THERE IS PRESERVATION OF THE VILLOUS ARCHITECTURE. FRAGMENTS OF NONNEOPLASTIC SMALL INTESTINAL MUCOSA WITH NO ACTIVE INFLAMMATION, ULCERATION OR DYSPLASIA IDENTIFIED.  9/2013- EGD- Normal esophagus. Z-line regular, 36 cm from the incisors. A single gastric polyp. Resected and retrieved. A small amount of food (residue) in the stomach. Removal was successful. Mucosal abnormality in the duodenum. Biopsied. Mild chronic gastritis but otherwise negative.   5/2012- EGD- Normal Study.   5/2012- Colonoscopy- Crohn's colitis mostly involving the distal 30 cm. This was biopsied to r/o CMV, HSV. Mild ileocolonic anastomosis Crohn's disease. Chronic active colitis with surface ulceration,  cryptitis, crypt abscess and reactive changes. Negative for CMV.     Past Medical History:   Diagnosis Date    Abnormal Pap smear 2007    Abnormal Pap smear 5/26/2011    Anemia     Anxiety     Arthritis     C. difficile diarrhea     Crohn's disease     Depression 8/5/2017    Encounter for blood transfusion     Genital HSV     History of colposcopy with cervical biopsy 2007 and 7/2011 2007-LYLA I  and 7/2011- LYLA I    Hypertension     Kidney stone     Kidney stone     Melanoma     Recurrent UTI 4/3/2013    S/P ileostomy 7/9/2012    Sterilization 6/23/2012       Past Surgical History:   Procedure Laterality Date    ABDOMINAL SURGERY      APPENDECTOMY      ARTHROPLASTY OF SHOULDER Left 7/18/2023    Procedure: ARTHROPLASTY, SHOULDER;  Surgeon: Sharon Babb MD;  Location: [facility];  Service: Orthopedics;  Laterality: Left;    AUGMENTATION OF BREAST Bilateral 06/2022    gel implants    BILATERAL SALPINGO-OOPHORECTOMY (BSO) Bilateral 05/30/2019    Procedure: SALPINGO-OOPHORECTOMY, BILATERAL;  Surgeon: Rupa German MD;  Location: [facility] OR [address] [place] [address];  Service: OB/GYN;  Laterality: Bilateral;    BLADDER SURGERY      partial cystectomy due to fistula    breast lift      BREAST SURGERY      [facility]      COLON SURGERY      COLONOSCOPY      CYSTOSCOPY  09/23/2020    Procedure: CYSTOSCOPY;  Surgeon: Sascha Florentino MD;  Location: [facility] OR [address] [place] [address];  Service: Urology;;    CYSTOSCOPY W/ URETERAL STENT PLACEMENT Left 09/15/2020    Procedure: CYSTOSCOPY, WITH URETERAL STENT INSERTION;  Surgeon: Sascha Florentino MD;  Location: [facility] OR [address] [place] [address];  Service: Urology;  Laterality: Left;    DIAGNOSTIC LAPAROSCOPY N/A 07/09/2020    Procedure: LAPAROSCOPY, DIAGNOSTIC;  Surgeon: Gilson El MD;  Location: [facility];  Service: OB/GYN;  Laterality: N/A;    EXCISION OF MELANOMA  07/17/2019    ILEOSTOMY      LAPAROSCOPIC LYSIS OF ADHESIONS N/A 07/09/2020    Procedure: LYSIS, ADHESIONS, LAPAROSCOPIC;  Surgeon: Gilson El MD;  [facility];  Service: OB/GYN;  Laterality: N/A;    LASER LITHOTRIPSY  09/23/2020    Procedure: LITHOTRIPSY, USING LASER;  Surgeon: Sascha Florentino MD;  Location: [facility] OR 1ST FLR;  Service: Urology;;    LYSIS OF ADHESIONS N/A 05/30/2019    Procedure: LYSIS, ADHESIONS;  Surgeon: Rupa German MD;  Location: [facility] OR 2ND FLR;  Service: OB/GYN;  Laterality: N/A;    OOPHORECTOMY Right 04/16/2015    PORTACATH PLACEMENT  02/21/2017    SKIN BIOPSY      SMALL INTESTINE SURGERY      age 18 Y    TOTAL ABDOMINAL HYSTERECTOMY  04/16/2015    TOTAL COLECTOMY      TUBAL LIGATION  06/06/2012    UPPER GASTROINTESTINAL ENDOSCOPY      URETEROSCOPIC REMOVAL OF URETERIC CALCULUS  09/23/2020    Procedure: REMOVAL, CALCULUS, URETER, URETEROSCOPIC;  Surgeon: Sascha Florentino MD;  Location: [facility] OR 1ST FLR;  Service: Urology;;    URETEROSCOPY Left 09/23/2020    Procedure: URETEROSCOPY;  Surgeon: Sascha Florentino MD;  Location: [facility] OR 1ST FLR;  Service: Urology;  Laterality: Left;       Family History   Problem Relation Age of Onset    Diabetes Paternal Grandfather     Hearing loss Paternal Grandmother     Cancer Maternal Grandfather         Skin    Skin cancer Maternal Grandfather     Diabetes Maternal Grandfather     Heart disease Maternal Grandfather     Colon cancer Father     Cancer Father         Colon    Liver cancer Father     Hyperlipidemia Father     Hypertension Mother     Crohn's disease Brother     Endometrial cancer Maternal Aunt     Breast cancer Maternal Cousin 41    Crohn's disease Daughter     Ovarian cancer Neg Hx     Melanoma Neg Hx        Social History     Socioeconomic History    Marital status: Single   Occupational History     Employer: [employer]   Tobacco Use    Smoking status: Never    Smokeless tobacco: Never   Substance and Sexual Activity    Alcohol use: Not Currently     Alcohol/week: 0.0 standard drinks of alcohol    Drug use: No    Sexual activity: Yes     Partners: Male     Birth  control/protection: See Surgical Hx     Comment: HYST   Other Topics Concern    Are you pregnant or think you may be? No    Breast-feeding No     Social Determinants of Health     Financial Resource Strain: High Risk (10/24/2023)    Overall Financial Resource Strain (CARDIA)     Difficulty of Paying Living Expenses: Hard   Food Insecurity: Food Insecurity Present (10/24/2023)    Hunger Vital Sign     Worried About Running Out of Food in the Last Year: Sometimes true     Ran Out of Food in the Last Year: Sometimes true   Transportation Needs: Unmet Transportation Needs (10/24/2023)    PRAPARE - Transportation     Lack of Transportation (Medical): Yes     Lack of Transportation (Non-Medical): Yes   Physical Activity: Insufficiently Active (10/24/2023)    Exercise Vital Sign     Days of Exercise per Week: 3 days     Minutes of Exercise per Session: 40 min   Stress: Stress Concern Present (10/24/2023)    [employer] - Occupational Stress Questionnaire     Feeling of Stress : Rather much   Social Connections: Unknown (10/24/2023)    Social Connection and Isolation Panel [NHANES]     Frequency of Communication with Friends and Family: More than three times a week     Frequency of Social Gatherings with Friends and Family: Once a week     Active Member of Clubs or Organizations: No     Attends Club or Organization Meetings: Patient declined     Marital Status:    Housing Stability: High Risk (10/24/2023)    Housing Stability Vital Sign     Unable to Pay for Housing in the Last Year: Yes     Number of Places Lived in the Last Year: 2     Unstable Housing in the Last Year: No       No current facility-administered medications on file prior to encounter.     Current Outpatient Medications on File Prior to Encounter   Medication Sig Dispense Refill    clonazePAM (KLONOPIN) 1 MG tablet Take 1 tablet (1 mg total) by mouth 2 (two) times daily. 60 tablet 2    cyclobenzaprine (FLEXERIL) 10 MG  tablet Take 1 tablet (10 mg total) by mouth every evening as needed for muscle pain 15 tablet 0    droNABinol (MARINOL) 5 MG capsule Take 1 capsule (5 mg total) by mouth 2 (two) times daily before meals. 60 capsule 1    estradioL (ESTRACE) 0.01 % (0.1 mg/gram) vaginal cream Place 1 g vaginally once daily. 30 g 12    flu vacc [id number] 6mos up,PF, (FLUARIX QUAD [id number], PF,) 60 mcg (15 mcg x 4)/0.5 mL Syrg inject into muscle for one dose 0.5 mL 0    fluconazole (DIFLUCAN) 150 MG Tab Take 1 tablet (150 mg total) by mouth every 72 hours as needed (vaginal yeast). 3 tablet 0    gabapentin (NEURONTIN) 300 MG capsule Take 1 capsule (300 mg total) by mouth 2 (two) times daily. 60 capsule 11    HYDROcodone-acetaminophen (NORCO) 7.5-325 mg per tablet Take 1 tablet by mouth every 24 hours as needed for Pain. 7 tablet 0    loperamide (IMODIUM) 2 mg capsule Take 2 mg by mouth daily as needed for Diarrhea.      mirtazapine (REMERON SOL-TAB) 30 MG disintegrating tablet Take 1 tablet (30 mg total) by mouth nightly. 30 tablet 0    multivitamin (THERAGRAN) per tablet Take 1 tablet by mouth once daily.      nadoloL (CORGARD) 40 MG tablet Take 1 tablet (40 mg total) by mouth once daily. Patient needs appointment before next refill. 30 tablet 0    pantoprazole (PROTONIX) 40 MG tablet Take 1 tablet (40 mg total) by mouth 2 (two) times daily. 60 tablet 12    promethazine (PHENERGAN) 25 MG tablet TAKE 1 TABLET BY MOUTH EVERY 8 HOURS FOR NAUSEA 30 tablet 1    tamsulosin (FLOMAX) 0.4 mg Cap Take 1 capsule (0.4 mg total) by mouth once daily. 30 capsule 1    triamcinolone acetonide 0.1% (KENALOG) 0.1 % cream Apply topically 2 (two) times daily.      valACYclovir (VALTREX) 500 MG tablet Take 1 tablet (500 mg total) by mouth once daily. 90 tablet 3    vedolizumab (ENTYVIO) 300 mg SolR injection Inject 300 mg into the vein.      zolpidem (AMBIEN) 10 mg Tab Take 1 tablet (10 mg total) by mouth every evening. 30 tablet 2       Review of  patient's allergies indicates:   Allergen Reactions    Azathioprine sodium Other (See Comments)     Other reaction(s): pancreatitis  Other reaction(s): pancreatitis    Methotrexate analogues Other (See Comments)     leukopenia    Stelara [ustekinumab] Other (See Comments)     Multiple infections    Zofran [ondansetron hcl (pf)] Other (See Comments)     Per patient causes prolong QT    Vancomycin analogues Other (See Comments)     Made her red    Azathioprine      Other reaction(s): Unknown    Methotrexate      Other reaction(s): infection-    Morphine Itching and Other (See Comments)     Other reaction(s): Itching    Zofran [ondansetron hcl]      Other reaction(s): Hives    Bactrim [sulfamethoxazole-trimethoprim] Palpitations    Ciprofloxacin Palpitations       Review of Systems   Constitutional:  Positive for chills and malaise/fatigue. Negative for fever.   HENT:  Negative for congestion and sore throat.    Respiratory:  Negative for cough and shortness of breath.    Cardiovascular:  Positive for palpitations. Negative for chest pain.   Gastrointestinal:  Positive for abdominal pain (Right Sided), diarrhea, nausea and vomiting. Negative for blood in stool, constipation and melena.   Genitourinary:  Negative for dysuria and frequency.   Musculoskeletal:  Negative for back pain and myalgias.   Skin:  Negative for itching and rash.   Neurological:  Negative for dizziness and headaches.   Psychiatric/Behavioral:  Positive for depression. Negative for suicidal ideas.       Objective:     Vitals:    01/01/24 1507   BP:    Pulse: 68   Resp:    Temp:      Constitutional:  not in acute distress   HENT: Head: Normal, normocephalic, atraumatic.  Eyes: conjunctiva clear and sclera nonicteric  Cardiovascular: regular rate and rhythm  Respiratory: normal chest expansion & respiratory effort   and no accessory muscle use  GI: soft, nondistended, tenderness mild in the RUQ and in the RLQ, without guarding, and without rebound,  stoma evaluated with bag removed, patient did not want to take the adhesive completely off to allow for evaluation of the para-stomal site as it was just replaced  Musculoskeletal: no muscular tenderness noted, port noted to left upper chest wall, CDI without signs of infection   Skin: normal color  Neurological: alert, oriented x3  Psychiatric: mood and affect are within normal limits, pt is a good historian; no memory problems were noted    Significant Labs:  Recent Labs   Lab 12/27/23  1019 12/31/23  1651 01/01/24  0336   HGB 12.7 12.2 11.7*       Lab Results   Component Value Date    WBC 9.65 01/01/2024    HGB 11.7 (L) 01/01/2024    HCT 34.9 (L) 01/01/2024    MCV 86 01/01/2024     01/01/2024       Lab Results   Component Value Date     01/01/2024    K 3.7 01/01/2024     01/01/2024    CO2 24 01/01/2024    BUN 6 01/01/2024    CREATININE 0.7 01/01/2024    CALCIUM 8.8 01/01/2024    ANIONGAP 16 01/01/2024    ESTGFRAFRICA >60.0 03/24/2022    EGFRNONAA >60.0 03/24/2022       Lab Results   Component Value Date    ALT 41 12/31/2023    AST 30 12/31/2023    ALKPHOS 175 (H) 12/31/2023    BILITOT 0.4 12/31/2023       Lab Results   Component Value Date    INR 1.1 02/02/2018    INR 1.0 08/04/2017    INR 1.0 10/20/2016       Significant Imaging:  Reviewed pertinent radiology findings.       Assessment/Plan:     Ivy Salgado is a 43 y.o. 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 GI consulted for concerns for an IBD flare.     Most Recent Imagin23 CT A/P with contrast-  There is decompression of a few scattered mid small bowel loops noting distension and slow flow of the distal small bowel and associated wall hyperemia.  The ileostomy appears patent there is scattered interloop fluid and venous vascular engorgement involving the distended bowel loops, no findings to suggest abscess.       Most Recent Endoscopies:   2017- Ileoscopy- The examined portion of the ileum was normal.   Biopsied. Widely patent end ileostomy with healthy appearing mucosa at the ileostomy. Fragments of unremarkable small bowel mucosa. No evidence of active colitis, granuloma, dysplasia or malignancy    Problem List:  Crohn's Disease of the small and large intestines, s/p total proctocolectomy with end ileostomy (), post-op course complicated by perineal wound for ~1 year (now healed), currently off all therapies    Mild Malnutrition (Albumin 3.3)   Normocytic Anemia   GERD  History of colovesical fistula s/p operative management ()  History of abdominal abscess s/p operative management ()   Drug induced pancreatitis 2/2 Imuran   Elevated CRP   Elevated AST    CRP Trend:   1.8 (2023) -> 0.9 (2023) -> 9.3 (2023)-> 22.8 (23)->33.2 (24)    Recommendations:  - Plan for EGD with bx to rule out H pylori as well as Ileoscopy on . Please make npo at midnight. Okay for clear liquids today.  - Continue to hold off on antibiotics and steroids per our inpatient IBD algorithm pending workup.  - Follow up stool calprotectin  - Follow up stool cx   - Follow up infectious stool studies; C. Diff and E coli negative.  - Follow up full serologic profile.  - Please avoid NSAID's  - Please ensure the patient is on DVT ppx; Lovenox scheduled (MAR reviewed)  - If narcotics are needed, please use morphine; this  is the preferred agent in IBD given its short acting nature     Thank you for involving us in the care of Ivy Salgado. Please call with any additional questions, concerns or changes in the patient's clinical status. We will continue to follow.    Beronica Holguin MD  Gastroenterology Fellow PGY -IV  [facility]

## 2024-01-01 NOTE — H&P
[facility] Emergency Dept  Hospital Medicine  History & Physical    Patient Name: Ivy Salgado  MRN: [id number]  Patient Class: OP- Observation  Admission Date: 12/31/2023  Attending Physician: Kailash Grove MD   Primary Care Provider: Luis Madden DO         Patient information was obtained from patient, past medical records, and ER records.     Subjective:     Principal Problem:Crohn's disease of small intestine with complication    Chief Complaint:   Chief Complaint   Patient presents with    Abdominal Pain     Has ileostomy. Crp was sl elevated, more stools        HPI: Ivy Salgado is a 43 y.o. female with a PMHx of ARPITA, Crohn's s/p ileostomy on Entyvio who presents to [facility] for evaluation of abdominal pain. Patient reprots intermittnet severe generalized abdominal pain for the past 4 days. Pain is worse on her right side and occasionally radiates to her flank. Endorses associated increased ostomy output, poor appetite, and nausea without any vomiting. She feels flushed and had increased temp at home but no fever (reported Tmax 100.2). Symptoms are consistent with prior Crohn's flares, has not had a flare since 2020.  Denies dark/bloody stools, HA, vision changes, chest pain, SOB, LE swelling or syncope.    ED: AFVSS. No leukocytosis or electrolyte abnormalities. ESR 94, CRP 22.8. UA noninfectious. CT abd/pelvis shows slow flow through a few mid to distal small bowel loops noting venous vascular engorgement about these loops and wall hyperemia concerning for inflammatory enteritis. ED provider spoke with GI who recommend holding antibiotics for now and NPO at midnight for potential scope in AM. Given IV dilaudid 1mg x2 and IV compazine.     Past Medical History:   Diagnosis Date    Abnormal Pap smear 2007    Abnormal Pap smear 5/26/2011    Anemia     Anxiety     Arthritis     C. difficile diarrhea     Crohn's disease     Depression 8/5/2017    Encounter for blood transfusion     Genital HSV      History of colposcopy with cervical biopsy 2007 and 7/2011 2007-LYLA I  and 7/2011- LYLA I    Hypertension     Kidney stone     Kidney stone     Melanoma     Recurrent UTI 4/3/2013    S/P ileostomy 7/9/2012    Sterilization 6/23/2012       Past Surgical History:   Procedure Laterality Date    ABDOMINAL SURGERY      APPENDECTOMY      ARTHROPLASTY OF SHOULDER Left 7/18/2023    Procedure: ARTHROPLASTY, SHOULDER;  Surgeon: Sharon Babb MD;  Location: [facility] OR;  Service: Orthopedics;  Laterality: Left;    AUGMENTATION OF BREAST Bilateral 06/2022    gel implants    BILATERAL SALPINGO-OOPHORECTOMY (BSO) Bilateral 05/30/2019    Procedure: SALPINGO-OOPHORECTOMY, BILATERAL;  Surgeon: Rupa German MD;  Location: [address];  Service: OB/GYN;  Laterality: Bilateral;    BLADDER SURGERY      partial cystectomy due to fistula    breast lift      BREAST SURGERY      [facility]      COLON SURGERY      COLONOSCOPY      CYSTOSCOPY  09/23/2020    Procedure: CYSTOSCOPY;  Surgeon: Sascha Florentino MD;  Location: [facility] OR [address] [place] [address];  Service: Urology;;    CYSTOSCOPY W/ URETERAL STENT PLACEMENT Left 09/15/2020    Procedure: CYSTOSCOPY, WITH URETERAL STENT INSERTION;  Surgeon: Sascha Florentino MD;  Location: [facility] OR [address] [place] [address];  Service: Urology;  Laterality: Left;    DIAGNOSTIC LAPAROSCOPY N/A 07/09/2020    Procedure: LAPAROSCOPY, DIAGNOSTIC;  Surgeon: Gilson El MD;  Location: [facility];  Service: OB/GYN;  Laterality: N/A;    EXCISION OF MELANOMA  07/17/2019    ILEOSTOMY      LAPAROSCOPIC LYSIS OF ADHESIONS N/A 07/09/2020    Procedure: LYSIS, ADHESIONS, LAPAROSCOPIC;  Surgeon: Gilson El MD;  Location: [facility] OR;  Service: OB/GYN;  Laterality: N/A;    LASER LITHOTRIPSY  09/23/2020    Procedure: LITHOTRIPSY, USING LASER;  Surgeon: Sascha Florentino MD;  Location: [facility] OR [address] [place] [address];  Service: Urology;;    LYSIS OF ADHESIONS N/A 05/30/2019    Procedure: LYSIS, ADHESIONS;  Surgeon: Rupa German MD;  Location: [address]  [facility];  Service: OB/GYN;  Laterality: N/A;    OOPHORECTOMY Right 04/16/2015    PORTACATH PLACEMENT  02/21/2017    SKIN BIOPSY      SMALL INTESTINE SURGERY      age 18 Y    TOTAL ABDOMINAL HYSTERECTOMY  04/16/2015    TOTAL COLECTOMY      TUBAL LIGATION  06/06/2012    UPPER GASTROINTESTINAL ENDOSCOPY      URETEROSCOPIC REMOVAL OF URETERIC CALCULUS  09/23/2020    Procedure: REMOVAL, CALCULUS, URETER, URETEROSCOPIC;  Surgeon: Sascha Florentino MD;  Location: [facility] OR 1ST FLR;  Service: Urology;;    URETEROSCOPY Left 09/23/2020    Procedure: URETEROSCOPY;  Surgeon: Sascha Florentino MD;  Location: [facility] OR 1ST FLR;  Service: Urology;  Laterality: Left;       Review of patient's allergies indicates:   Allergen Reactions    Azathioprine sodium Other (See Comments)     Other reaction(s): pancreatitis  Other reaction(s): pancreatitis    Methotrexate analogues Other (See Comments)     leukopenia    Stelara [ustekinumab] Other (See Comments)     Multiple infections    Zofran [ondansetron hcl (pf)] Other (See Comments)     Per patient causes prolong QT    Vancomycin analogues Other (See Comments)     Made her red    Azathioprine      Other reaction(s): Unknown    Methotrexate      Other reaction(s): infection-    Morphine Itching and Other (See Comments)     Other reaction(s): Itching    Zofran [ondansetron hcl]      Other reaction(s): Hives    Bactrim [sulfamethoxazole-trimethoprim] Palpitations    Ciprofloxacin Palpitations       Current Facility-Administered Medications on File Prior to Encounter   Medication    estradiol valerate (DELESTROGEN) injection 20 mg/mL    estradiol valerate injection 20 mg     Current Outpatient Medications on File Prior to Encounter   Medication Sig    clonazePAM (KLONOPIN) 1 MG tablet Take 1 tablet (1 mg total) by mouth 2 (two) times daily.    cyclobenzaprine (FLEXERIL) 10 MG tablet Take 1 tablet (10 mg total) by mouth every evening as needed for muscle pain    droNABinol (MARINOL) 5 MG  capsule Take 1 capsule (5 mg total) by mouth 2 (two) times daily before meals.    estradioL (ESTRACE) 0.01 % (0.1 mg/gram) vaginal cream Place 1 g vaginally once daily.    flu vacc [id number] 6mos up,PF, (FLUARIX QUAD [id number], PF,) 60 mcg (15 mcg x 4)/0.5 mL Syrg inject into muscle for one dose    fluconazole (DIFLUCAN) 150 MG Tab Take 1 tablet (150 mg total) by mouth every 72 hours as needed (vaginal yeast).    gabapentin (NEURONTIN) 300 MG capsule Take 1 capsule (300 mg total) by mouth 2 (two) times daily.    HYDROcodone-acetaminophen (NORCO) 7.5-325 mg per tablet Take 1 tablet by mouth every 24 hours as needed for Pain.    loperamide (IMODIUM) 2 mg capsule Take 2 mg by mouth daily as needed for Diarrhea.    mirtazapine (REMERON SOL-TAB) 30 MG disintegrating tablet Take 1 tablet (30 mg total) by mouth nightly.    multivitamin (THERAGRAN) per tablet Take 1 tablet by mouth once daily.    nadoloL (CORGARD) 40 MG tablet Take 1 tablet (40 mg total) by mouth once daily. Patient needs appointment before next refill.    pantoprazole (PROTONIX) 40 MG tablet Take 1 tablet (40 mg total) by mouth 2 (two) times daily.    promethazine (PHENERGAN) 25 MG tablet TAKE 1 TABLET BY MOUTH EVERY 8 HOURS FOR NAUSEA    tamsulosin (FLOMAX) 0.4 mg Cap Take 1 capsule (0.4 mg total) by mouth once daily.    triamcinolone acetonide 0.1% (KENALOG) 0.1 % cream Apply topically 2 (two) times daily.    valACYclovir (VALTREX) 500 MG tablet Take 1 tablet (500 mg total) by mouth once daily.    vedolizumab (ENTYVIO) 300 mg SolR injection Inject 300 mg into the vein.    zolpidem (AMBIEN) 10 mg Tab Take 1 tablet (10 mg total) by mouth every evening.     Family History       Problem Relation (Age of Onset)    Breast cancer Maternal Cousin (41)    Cancer Maternal Grandfather, Father    Colon cancer Father    Crohn's disease Brother, Daughter    Diabetes Paternal Grandfather, Maternal Grandfather    Endometrial cancer Maternal Aunt    Hearing loss Paternal  Grandmother    Heart disease Maternal Grandfather    Hyperlipidemia Father    Hypertension Mother    Liver cancer Father    Skin cancer Maternal Grandfather          Tobacco Use    Smoking status: Never    Smokeless tobacco: Never   Substance and Sexual Activity    Alcohol use: Not Currently     Alcohol/week: 0.0 standard drinks of alcohol    Drug use: No    Sexual activity: Yes     Partners: Male     Birth control/protection: See Surgical Hx     Comment: HYST     Review of Systems   Constitutional:  Positive for appetite change. Negative for chills, fatigue and fever.   HENT:  Negative for trouble swallowing and voice change.    Eyes:  Negative for photophobia and visual disturbance.   Respiratory:  Negative for cough, chest tightness, shortness of breath and wheezing.    Cardiovascular:  Negative for chest pain, palpitations and leg swelling.   Gastrointestinal:  Positive for abdominal pain, diarrhea and nausea. Negative for blood in stool, constipation and vomiting.   Genitourinary:  Negative for difficulty urinating, dysuria, frequency, hematuria and urgency.   Musculoskeletal:  Negative for arthralgias, back pain and gait problem.   Skin:  Negative for color change and rash.   Neurological:  Negative for dizziness, seizures, speech difficulty, weakness, light-headedness and headaches.   Psychiatric/Behavioral:  Negative for behavioral problems, confusion and decreased concentration.      Objective:     Vital Signs (Most Recent):  Temp: 98.9 °F (37.2 °C) (12/31/23 1944)  Pulse: 87 (12/31/23 1940)  Resp: 16 (12/31/23 1653)  BP: 114/81 (12/31/23 1941)  SpO2: 98 % (12/31/23 1940) Vital Signs (24h Range):  Temp:  [98.9 °F (37.2 °C)-99.9 °F (37.7 °C)] 98.9 °F (37.2 °C)  Pulse:  [78-89] 87  Resp:  [12-18] 16  SpO2:  [95 %-99 %] 98 %  BP: (107-136)/(67-81) 114/81     Weight: 52.2 kg (115 lb)  Body mass index is 21.73 kg/m².     Physical Exam  Vitals and nursing note reviewed.   Constitutional:       General: She is not  in acute distress.     Appearance: She is well-developed.   HENT:      Head: Normocephalic and atraumatic.      Mouth/Throat:      Pharynx: No oropharyngeal exudate.   Eyes:      General: No scleral icterus.     Conjunctiva/sclera: Conjunctivae normal.   Cardiovascular:      Rate and Rhythm: Normal rate and regular rhythm.      Heart sounds: Normal heart sounds.   Pulmonary:      Effort: Pulmonary effort is normal. No respiratory distress.      Breath sounds: Normal breath sounds. No wheezing.   Abdominal:      General: There is no distension.      Palpations: Abdomen is soft.      Tenderness: There is abdominal tenderness.      Comments: Bowel sounds decreased throughout. Ileostomy in place without surrounding erythema or drainage   Musculoskeletal:         General: No tenderness. Normal range of motion.      Cervical back: Normal range of motion and neck supple.   Lymphadenopathy:      Cervical: No cervical adenopathy.   Skin:     General: Skin is warm and dry.      Capillary Refill: Capillary refill takes less than 2 seconds.      Findings: No rash.   Neurological:      Mental Status: She is alert and oriented to person, place, and time.      Cranial Nerves: No cranial nerve deficit.      Sensory: No sensory deficit.      Coordination: Coordination normal.   Psychiatric:         Behavior: Behavior normal.         Thought Content: Thought content normal.         Judgment: Judgment normal.                Significant Labs: All pertinent labs within the past 24 hours have been reviewed.  CBC:   Recent Labs   Lab 12/31/23  1651   WBC 9.66   HGB 12.2   HCT 36.8*        CMP:   Recent Labs   Lab 12/31/23  1651      K 3.8      CO2 24   GLU 97   BUN 9   CREATININE 0.6   CALCIUM 9.9   PROT 8.0   ALBUMIN 3.3*   BILITOT 0.4   ALKPHOS 175*   AST 30   ALT 41   ANIONGAP 13       Significant Imaging: I have reviewed all pertinent imaging results/findings within the past 24 hours.  CT Abdomen Pelvis With IV  Contrast NO Oral Contrast  Narrative: EXAMINATION:  CT ABDOMEN PELVIS WITH IV CONTRAST    CLINICAL HISTORY:  Abdominal pain, acute, nonlocalized;    TECHNIQUE:  Low dose axial images, sagittal and coronal reformations were obtained from the lung bases to the pubic symphysis following the IV administration of 75 mL of Omnipaque 350 no or    COMPARISON:  CT 10/24/2023    FINDINGS:  Images of the lower thorax are remarkable for bilateral dependent atelectasis.    The liver is enlarged, correlation with LFTs recommended.  The spleen, pancreas, gallbladder and adrenal glands are grossly unremarkable.  There is no biliary dilation or ascites.  The portal vein, splenic vein, SMV, celiac axis and SMA all are patent.  There are a few scattered upper limit of normal caliber abdominal lymph nodes.    The kidneys enhance symmetrically without hydronephrosis or nephrolithiasis.  Subcentimeter low attenuating lesions arise from the kidneys bilaterally, too small for characterization.  The bilateral ureters are unable to be followed in their entirety to the urinary bladder, no definite calculi seen or secondary findings to suggest obstructive uropathy.  There is decompression of a few scattered mid small bowel loops noting distension and slow flow of the distal small bowel and associated wall hyperemia.  The ileostomy appears patent there is scattered interloop fluid and venous vascular engorgement involving the distended bowel loops, no findings to suggest abscess.  Several enlarged central mesenteric lymph nodes noted.    There are degenerative changes of the spine.  There are bilateral breast prostheses.  There is dystrophic calcification within the right gluteal soft tissues.  No significant inguinal lymphadenopathy.  Impression: This report was flagged in Epic as abnormal.    1. Slow flow through a few mid to distal small bowel loops noting venous vascular engorgement about these loops and wall hyperemia concerning for  inflammatory enteritis in this patient with history of inflammatory bowel disease.  No high-grade obstruction at this time.  No findings to suggest abscess.  2. Surgical changes of partial small-bowel resection, colectomy, and right quadrant ileostomy without obstruction.  3. Hepatomegaly, correlation with LFTs recommended.  4. Please see above for several additional findings.    Electronically signed by: Kelby Solano MD  Date:    12/31/2023  Time:    18:42      Assessment/Plan:     * Crohn's disease of small intestine with complication  - presentation consistent with Crohn's flare  - afebrile without leukocytosis  - inflammatory makers elevated  - CT abd/pelvis shows inflammatory enteritis without abscess or obstruction   - GI consulted in the ED, rec holding on IV steroids for now with tentative plan for scope in AM  - NPO at MN  - stool, blood cx pending  - cIVFs overnight  - pain and nausea control   - hold on antibiotics for now unless becomes febrile or shows other signs of sepsis      Generalized anxiety disorder  - continue Klonopin and Remeron   - sub nadolol with propranolol     S/P ileostomy  - routine ostomy care      VTE Risk Mitigation (From admission, onward)           Ordered     enoxaparin injection 40 mg  Daily         12/31/23 1912     IP VTE LOW RISK PATIENT  Once         12/31/23 1912     Place sequential compression device  Until discontinued         12/31/23 1912                         On 12/31/2023, patient should be placed in hospital observation services under my care in collaboration with Dr. Kailash Grove.           Kezia Carrion PA-C  Department of Hospital Medicine  [facility] - Emergency Dept

## 2024-01-01 NOTE — HPI
Ivy Salgado is a 43 y.o. female with a PMHx of ARPITA, Crohn's s/p ileostomy on Entyvio who presents to [facility] for evaluation of abdominal pain. Patient reprots intermittnet severe generalized abdominal pain for the past 4 days. Pain is worse on her right side and occasionally radiates to her flank. Endorses associated increased ostomy output, poor appetite, and nausea without any vomiting. She feels flushed and had increased temp at home but no fever (reported Tmax 100.2). Symptoms are consistent with prior Crohn's flares, has not had a flare since 2020.  Denies dark/bloody stools, HA, vision changes, chest pain, SOB, LE swelling or syncope.    ED: AFVSS. No leukocytosis or electrolyte abnormalities. ESR 94, CRP 22.8. UA noninfectious. CT abd/pelvis shows slow flow through a few mid to distal small bowel loops noting venous vascular engorgement about these loops and wall hyperemia concerning for inflammatory enteritis. ED provider spoke with GI who recommend holding antibiotics for now and NPO at midnight for potential scope in AM. Given IV dilaudid 1mg x2 and IV compazine.

## 2024-01-02 ENCOUNTER — ANESTHESIA EVENT (OUTPATIENT)
Dept: ENDOSCOPY | Facility: HOSPITAL | Age: 44
DRG: 386 | End: 2024-01-02
Payer: COMMERCIAL

## 2024-01-02 LAB
ALBUMIN SERPL BCP-MCNC: 2.8 G/DL (ref 3.5–5.2)
ALP SERPL-CCNC: 170 U/L (ref 55–135)
ALT SERPL W/O P-5'-P-CCNC: 34 U/L (ref 10–44)
ANION GAP SERPL CALC-SCNC: 9 MMOL/L (ref 8–16)
AST SERPL-CCNC: 52 U/L (ref 10–40)
BASOPHILS # BLD AUTO: 0.01 K/UL (ref 0–0.2)
BASOPHILS NFR BLD: 0.2 % (ref 0–1.9)
BILIRUB SERPL-MCNC: 0.4 MG/DL (ref 0.1–1)
BUN SERPL-MCNC: 7 MG/DL (ref 6–20)
CALCIUM SERPL-MCNC: 9 MG/DL (ref 8.7–10.5)
CHLORIDE SERPL-SCNC: 106 MMOL/L (ref 95–110)
CHOLEST SERPL-MCNC: 192 MG/DL (ref 120–199)
CHOLEST/HDLC SERPL: 4.8 {RATIO} (ref 2–5)
CO2 SERPL-SCNC: 27 MMOL/L (ref 23–29)
CREAT SERPL-MCNC: 0.8 MG/DL (ref 0.5–1.4)
CRP SERPL-MCNC: 33.2 MG/L (ref 0–8.2)
DIFFERENTIAL METHOD BLD: ABNORMAL
E COLI SXT1 STL QL IA: NEGATIVE
E COLI SXT2 STL QL IA: NEGATIVE
EOSINOPHIL # BLD AUTO: 0.1 K/UL (ref 0–0.5)
EOSINOPHIL NFR BLD: 2.5 % (ref 0–8)
ERYTHROCYTE [DISTWIDTH] IN BLOOD BY AUTOMATED COUNT: 13.4 % (ref 11.5–14.5)
EST. GFR  (NO RACE VARIABLE): >60 ML/MIN/1.73 M^2
FERRITIN SERPL-MCNC: 26 NG/ML (ref 20–300)
GLUCOSE SERPL-MCNC: 114 MG/DL (ref 70–110)
HAV IGG SER QL IA: NORMAL
HBV CORE AB SERPL QL IA: NORMAL
HBV SURFACE AB SER-ACNC: >1000 MIU/ML
HBV SURFACE AB SER-ACNC: REACTIVE M[IU]/ML
HCT VFR BLD AUTO: 33.5 % (ref 37–48.5)
HDLC SERPL-MCNC: 40 MG/DL (ref 40–75)
HDLC SERPL: 20.8 % (ref 20–50)
HGB BLD-MCNC: 10.8 G/DL (ref 12–16)
IGA SERPL-MCNC: 327 MG/DL (ref 40–350)
IMM GRANULOCYTES # BLD AUTO: 0.01 K/UL (ref 0–0.04)
IMM GRANULOCYTES NFR BLD AUTO: 0.2 % (ref 0–0.5)
IRON SERPL-MCNC: 26 UG/DL (ref 30–160)
LDLC SERPL CALC-MCNC: 122 MG/DL (ref 63–159)
LYMPHOCYTES # BLD AUTO: 1.8 K/UL (ref 1–4.8)
LYMPHOCYTES NFR BLD: 31.5 % (ref 18–48)
MAGNESIUM SERPL-MCNC: 1.8 MG/DL (ref 1.6–2.6)
MCH RBC QN AUTO: 29.1 PG (ref 27–31)
MCHC RBC AUTO-ENTMCNC: 32.2 G/DL (ref 32–36)
MCV RBC AUTO: 90 FL (ref 82–98)
MONOCYTES # BLD AUTO: 0.8 K/UL (ref 0.3–1)
MONOCYTES NFR BLD: 14.2 % (ref 4–15)
MUMPS IGG INTERPRETATION: POSITIVE
MUMPS IGG SCREEN: 26.3 AU/ML
NEUTROPHILS # BLD AUTO: 2.9 K/UL (ref 1.8–7.7)
NEUTROPHILS NFR BLD: 51.4 % (ref 38–73)
NONHDLC SERPL-MCNC: 152 MG/DL
NRBC BLD-RTO: 0 /100 WBC
PHOSPHATE SERPL-MCNC: 3.6 MG/DL (ref 2.7–4.5)
PLATELET # BLD AUTO: 308 K/UL (ref 150–450)
PMV BLD AUTO: 9.4 FL (ref 9.2–12.9)
POTASSIUM SERPL-SCNC: 3.4 MMOL/L (ref 3.5–5.1)
PREALB SERPL-MCNC: 15 MG/DL (ref 20–43)
PROT SERPL-MCNC: 6.8 G/DL (ref 6–8.4)
RBC # BLD AUTO: 3.71 M/UL (ref 4–5.4)
RUBEOLA IGG ANTIBODY: 35.6 AU/ML
RUBEOLA INTERPRETATION: POSITIVE
RUBV IGG SER-ACNC: 21.1 IU/ML
RUBV IGG SER-IMP: REACTIVE
SATURATED IRON: 7 % (ref 20–50)
SODIUM SERPL-SCNC: 142 MMOL/L (ref 136–145)
T4 FREE SERPL-MCNC: 0.97 NG/DL (ref 0.71–1.51)
TOTAL IRON BINDING CAPACITY: 364 UG/DL (ref 250–450)
TRANSFERRIN SERPL-MCNC: 246 MG/DL (ref 200–375)
TRIGL SERPL-MCNC: 150 MG/DL (ref 30–150)
TSH SERPL DL<=0.005 MIU/L-ACNC: 2.39 UIU/ML (ref 0.4–4)
VARICELLA INTERPRETATION: POSITIVE
VARICELLA ZOSTER IGG: 883 AU/ML
VIT B12 SERPL-MCNC: 278 PG/ML (ref 210–950)
WBC # BLD AUTO: 5.62 K/UL (ref 3.9–12.7)

## 2024-01-02 PROCEDURE — 82607 VITAMIN B-12: CPT | Performed by: STUDENT IN AN ORGANIZED HEALTH CARE EDUCATION/TRAINING PROGRAM

## 2024-01-02 PROCEDURE — 84100 ASSAY OF PHOSPHORUS: CPT | Performed by: PHYSICIAN ASSISTANT

## 2024-01-02 PROCEDURE — 86038 ANTINUCLEAR ANTIBODIES: CPT | Performed by: PHYSICIAN ASSISTANT

## 2024-01-02 PROCEDURE — 25000003 PHARM REV CODE 250: Performed by: PHYSICIAN ASSISTANT

## 2024-01-02 PROCEDURE — 86704 HEP B CORE ANTIBODY TOTAL: CPT | Performed by: STUDENT IN AN ORGANIZED HEALTH CARE EDUCATION/TRAINING PROGRAM

## 2024-01-02 PROCEDURE — 83735 ASSAY OF MAGNESIUM: CPT | Performed by: PHYSICIAN ASSISTANT

## 2024-01-02 PROCEDURE — 21400001 HC TELEMETRY ROOM

## 2024-01-02 PROCEDURE — 85025 COMPLETE CBC W/AUTO DIFF WBC: CPT | Performed by: PHYSICIAN ASSISTANT

## 2024-01-02 PROCEDURE — 96372 THER/PROPH/DIAG INJ SC/IM: CPT | Performed by: PHYSICIAN ASSISTANT

## 2024-01-02 PROCEDURE — 86735 MUMPS ANTIBODY: CPT | Performed by: STUDENT IN AN ORGANIZED HEALTH CARE EDUCATION/TRAINING PROGRAM

## 2024-01-02 PROCEDURE — 86765 RUBEOLA ANTIBODY: CPT | Performed by: STUDENT IN AN ORGANIZED HEALTH CARE EDUCATION/TRAINING PROGRAM

## 2024-01-02 PROCEDURE — 63600175 PHARM REV CODE 636 W HCPCS

## 2024-01-02 PROCEDURE — 80061 LIPID PANEL: CPT | Performed by: STUDENT IN AN ORGANIZED HEALTH CARE EDUCATION/TRAINING PROGRAM

## 2024-01-02 PROCEDURE — 25000003 PHARM REV CODE 250: Performed by: FAMILY MEDICINE

## 2024-01-02 PROCEDURE — 25000003 PHARM REV CODE 250

## 2024-01-02 PROCEDURE — 83540 ASSAY OF IRON: CPT | Performed by: STUDENT IN AN ORGANIZED HEALTH CARE EDUCATION/TRAINING PROGRAM

## 2024-01-02 PROCEDURE — 82784 ASSAY IGA/IGD/IGG/IGM EACH: CPT | Performed by: STUDENT IN AN ORGANIZED HEALTH CARE EDUCATION/TRAINING PROGRAM

## 2024-01-02 PROCEDURE — 0034U TPMT NUDT15 GENES: CPT | Performed by: STUDENT IN AN ORGANIZED HEALTH CARE EDUCATION/TRAINING PROGRAM

## 2024-01-02 PROCEDURE — 86480 TB TEST CELL IMMUN MEASURE: CPT | Performed by: STUDENT IN AN ORGANIZED HEALTH CARE EDUCATION/TRAINING PROGRAM

## 2024-01-02 PROCEDURE — 84439 ASSAY OF FREE THYROXINE: CPT | Performed by: STUDENT IN AN ORGANIZED HEALTH CARE EDUCATION/TRAINING PROGRAM

## 2024-01-02 PROCEDURE — 86364 TISS TRNSGLTMNASE EA IG CLAS: CPT | Performed by: STUDENT IN AN ORGANIZED HEALTH CARE EDUCATION/TRAINING PROGRAM

## 2024-01-02 PROCEDURE — 86762 RUBELLA ANTIBODY: CPT | Performed by: STUDENT IN AN ORGANIZED HEALTH CARE EDUCATION/TRAINING PROGRAM

## 2024-01-02 PROCEDURE — 94761 N-INVAS EAR/PLS OXIMETRY MLT: CPT

## 2024-01-02 PROCEDURE — 86706 HEP B SURFACE ANTIBODY: CPT | Mod: 91 | Performed by: STUDENT IN AN ORGANIZED HEALTH CARE EDUCATION/TRAINING PROGRAM

## 2024-01-02 PROCEDURE — 80053 COMPREHEN METABOLIC PANEL: CPT | Performed by: STUDENT IN AN ORGANIZED HEALTH CARE EDUCATION/TRAINING PROGRAM

## 2024-01-02 PROCEDURE — 84134 ASSAY OF PREALBUMIN: CPT | Performed by: STUDENT IN AN ORGANIZED HEALTH CARE EDUCATION/TRAINING PROGRAM

## 2024-01-02 PROCEDURE — 82728 ASSAY OF FERRITIN: CPT | Performed by: STUDENT IN AN ORGANIZED HEALTH CARE EDUCATION/TRAINING PROGRAM

## 2024-01-02 PROCEDURE — 86015 ACTIN ANTIBODY EACH: CPT | Performed by: PHYSICIAN ASSISTANT

## 2024-01-02 PROCEDURE — 86787 VARICELLA-ZOSTER ANTIBODY: CPT | Performed by: STUDENT IN AN ORGANIZED HEALTH CARE EDUCATION/TRAINING PROGRAM

## 2024-01-02 PROCEDURE — 86790 VIRUS ANTIBODY NOS: CPT | Performed by: STUDENT IN AN ORGANIZED HEALTH CARE EDUCATION/TRAINING PROGRAM

## 2024-01-02 PROCEDURE — 86381 MITOCHONDRIAL ANTIBODY EACH: CPT | Performed by: PHYSICIAN ASSISTANT

## 2024-01-02 PROCEDURE — 84443 ASSAY THYROID STIM HORMONE: CPT | Performed by: STUDENT IN AN ORGANIZED HEALTH CARE EDUCATION/TRAINING PROGRAM

## 2024-01-02 PROCEDURE — 63600175 PHARM REV CODE 636 W HCPCS: Performed by: PHYSICIAN ASSISTANT

## 2024-01-02 PROCEDURE — 86140 C-REACTIVE PROTEIN: CPT | Performed by: PHYSICIAN ASSISTANT

## 2024-01-02 RX ORDER — MAGNESIUM SULFATE HEPTAHYDRATE 40 MG/ML
2 INJECTION, SOLUTION INTRAVENOUS ONCE
Status: COMPLETED | OUTPATIENT
Start: 2024-01-02 | End: 2024-01-02

## 2024-01-02 RX ORDER — POTASSIUM CHLORIDE 20 MEQ/1
40 TABLET, EXTENDED RELEASE ORAL ONCE
Status: COMPLETED | OUTPATIENT
Start: 2024-01-02 | End: 2024-01-02

## 2024-01-02 RX ORDER — NADOLOL 40 MG/1
40 TABLET ORAL DAILY
Status: DISCONTINUED | OUTPATIENT
Start: 2024-01-03 | End: 2024-01-07 | Stop reason: HOSPADM

## 2024-01-02 RX ORDER — LANOLIN ALCOHOL/MO/W.PET/CERES
1 CREAM (GRAM) TOPICAL DAILY
Status: DISCONTINUED | OUTPATIENT
Start: 2024-01-02 | End: 2024-01-07 | Stop reason: HOSPADM

## 2024-01-02 RX ADMIN — PROCHLORPERAZINE EDISYLATE 5 MG: 5 INJECTION INTRAMUSCULAR; INTRAVENOUS at 08:01

## 2024-01-02 RX ADMIN — MIRTAZAPINE 30 MG: 15 TABLET, ORALLY DISINTEGRATING ORAL at 08:01

## 2024-01-02 RX ADMIN — THERA TABS 1 TABLET: TAB at 08:01

## 2024-01-02 RX ADMIN — POTASSIUM CHLORIDE 40 MEQ: 1500 TABLET, EXTENDED RELEASE ORAL at 09:01

## 2024-01-02 RX ADMIN — PROCHLORPERAZINE EDISYLATE 5 MG: 5 INJECTION INTRAMUSCULAR; INTRAVENOUS at 12:01

## 2024-01-02 RX ADMIN — CLONAZEPAM 1 MG: 0.5 TABLET ORAL at 08:01

## 2024-01-02 RX ADMIN — HYDROMORPHONE HYDROCHLORIDE 0.5 MG: 1 INJECTION, SOLUTION INTRAMUSCULAR; INTRAVENOUS; SUBCUTANEOUS at 12:01

## 2024-01-02 RX ADMIN — PROCHLORPERAZINE EDISYLATE 5 MG: 5 INJECTION INTRAMUSCULAR; INTRAVENOUS at 04:01

## 2024-01-02 RX ADMIN — MORPHINE SULFATE 15 MG: 15 TABLET ORAL at 08:01

## 2024-01-02 RX ADMIN — ENOXAPARIN SODIUM 40 MG: 40 INJECTION SUBCUTANEOUS at 04:01

## 2024-01-02 RX ADMIN — DRONABINOL 5 MG: 2.5 CAPSULE ORAL at 03:01

## 2024-01-02 RX ADMIN — GABAPENTIN 300 MG: 300 CAPSULE ORAL at 08:01

## 2024-01-02 RX ADMIN — HYDROMORPHONE HYDROCHLORIDE 0.5 MG: 1 INJECTION, SOLUTION INTRAMUSCULAR; INTRAVENOUS; SUBCUTANEOUS at 08:01

## 2024-01-02 RX ADMIN — MAGNESIUM SULFATE HEPTAHYDRATE 2 G: 40 INJECTION, SOLUTION INTRAVENOUS at 09:01

## 2024-01-02 RX ADMIN — FERROUS SULFATE TAB EC 325 MG (65 MG FE EQUIVALENT) 1 EACH: 325 (65 FE) TABLET DELAYED RESPONSE at 09:01

## 2024-01-02 RX ADMIN — HYDROMORPHONE HYDROCHLORIDE 0.5 MG: 1 INJECTION, SOLUTION INTRAMUSCULAR; INTRAVENOUS; SUBCUTANEOUS at 04:01

## 2024-01-02 RX ADMIN — PANTOPRAZOLE SODIUM 40 MG: 40 TABLET, DELAYED RELEASE ORAL at 08:01

## 2024-01-02 RX ADMIN — MORPHINE SULFATE 30 MG: 15 TABLET ORAL at 03:01

## 2024-01-02 NOTE — SUBJECTIVE & OBJECTIVE
Interval History: Pt seen and examined by me this morning. BP soft otherwise VSSAF. Holding home nadolol. NAEON. Pt reports persistent abdominal pain (R>L) that is somewhat controlled on current regimen, currently 8/10. GI consulted. CLD today and NPO at midnight for likely scope tomorrow. Formal note to follow.      Review of Systems   Constitutional:  Positive for appetite change. Negative for chills, fatigue and fever.   HENT:  Negative for trouble swallowing and voice change.    Eyes:  Negative for photophobia and visual disturbance.   Respiratory:  Negative for cough, chest tightness, shortness of breath and wheezing.    Cardiovascular:  Negative for chest pain, palpitations and leg swelling.   Gastrointestinal:  Positive for abdominal pain, diarrhea and nausea. Negative for blood in stool, constipation and vomiting.   Genitourinary:  Negative for difficulty urinating, dysuria, frequency, hematuria and urgency.   Musculoskeletal:  Negative for arthralgias, back pain and gait problem.   Skin:  Negative for color change and rash.   Neurological:  Negative for dizziness, seizures, speech difficulty, weakness, light-headedness and headaches.   Psychiatric/Behavioral:  Negative for behavioral problems, confusion and decreased concentration.      Objective:     Vital Signs (Most Recent):  Temp: 98.5 °F (36.9 °C) (01/02/24 1155)  Pulse: 74 (01/02/24 1155)  Resp: 18 (01/02/24 1215)  BP: 112/62 (01/02/24 1155)  SpO2: 96 % (01/02/24 1155) Vital Signs (24h Range):  Temp:  [98.3 °F (36.8 °C)-99.3 °F (37.4 °C)] 98.5 °F (36.9 °C)  Pulse:  [68-82] 74  Resp:  [17-20] 18  SpO2:  [93 %-100 %] 96 %  BP: ()/(55-68) 112/62     Weight: 50.9 kg (112 lb 3.2 oz)  Body mass index is 21.2 kg/m².    Intake/Output Summary (Last 24 hours) at 1/2/2024 1341  Last data filed at 1/2/2024 0443  Gross per 24 hour   Intake --   Output 370 ml   Net -370 ml           Physical Exam  Vitals and nursing note reviewed.   Constitutional:        General: She is not in acute distress.     Appearance: She is well-developed.   HENT:      Head: Normocephalic and atraumatic.      Mouth/Throat:      Mouth: Mucous membranes are dry.   Eyes:      Extraocular Movements: Extraocular movements intact.      Conjunctiva/sclera: Conjunctivae normal.   Cardiovascular:      Rate and Rhythm: Normal rate and regular rhythm.      Heart sounds: Normal heart sounds.   Pulmonary:      Effort: Pulmonary effort is normal. No respiratory distress.      Breath sounds: Normal breath sounds. No wheezing.   Abdominal:      General: There is no distension.      Palpations: Abdomen is soft.      Tenderness: There is abdominal tenderness (R-sided).      Comments: Bowel sounds decreased throughout  Ileostomy in place without surrounding erythema or drainage   Musculoskeletal:         General: No tenderness. Normal range of motion.      Cervical back: Normal range of motion and neck supple.   Skin:     General: Skin is warm and dry.      Capillary Refill: Capillary refill takes less than 2 seconds.      Findings: No rash.   Neurological:      Mental Status: She is alert and oriented to person, place, and time.      Cranial Nerves: No cranial nerve deficit.      Sensory: No sensory deficit.      Coordination: Coordination normal.   Psychiatric:         Behavior: Behavior normal.         Thought Content: Thought content normal.         Judgment: Judgment normal.             Significant Labs: All pertinent labs within the past 24 hours have been reviewed.    Significant Imaging: I have reviewed all pertinent imaging results/findings within the past 24 hours.

## 2024-01-02 NOTE — PLAN OF CARE
[facility]ation 11H  Discharge Assessment    Primary Care Provider: Luis Madden, DO     Discharge Assessment (most recent)       BRIEF DISCHARGE ASSESSMENT - 01/02/24 1110          Discharge Planning    Assessment Type Discharge Planning Brief Assessment     Resource/Environmental Concerns none     Support Systems Family members     Equipment Currently Used at Home none     Current Living Arrangements home     Patient/Family Anticipates Transition to home     Patient/Family Anticipated Services at Transition none     DME Needed Upon Discharge  none     Discharge Plan A Home     Discharge Plan B Home                   Pt is a 43 y.o. female admitted with Crohn's Disease of small intestine. She has a PMH of ARPITA and Crohn's s/p ileostomy. She lives with her son in a single story house. She is independent with her ADls and iADLs and drives. [facility] Discharge Packet given to patient and/or family with understanding verbalized.   name and number and estimated discharge date written on white board in patient's room with request to call for any questions or concerns.  Will continue to follow for needs.  Gavino Navarro RN,BSN

## 2024-01-02 NOTE — ASSESSMENT & PLAN NOTE
- presentation consistent with Crohn's flare  - afebrile without leukocytosis  - inflammatory makers elevated and up-trending  - CT abd/pelvis shows inflammatory enteritis without abscess or obstruction   - GI consulted, appreciate recs:  - Plan for EGD with bx to rule out H pylori as well as Ileoscopy on 1/03. Please make npo at midnight. Okay for clear liquids today.  - Continue to hold off on antibiotics and steroids per our inpatient IBD algorithm pending workup.  - Follow up stool calprotectin  - Follow up stool cx   - Follow up infectious stool studies; C. Diff and E coli negative.  - Follow up full serologic profile.  - Please avoid NSAID's  - Please ensure the patient is on DVT ppx; Lovenox scheduled (MAR reviewed)  - If narcotics are needed, please use morphine; this is the preferred agent in IBD given its short acting nature   - NPO at MN  - Bcx NGTD   - pain and nausea control

## 2024-01-02 NOTE — PROGRESS NOTES
[facility] - Observation [address] [place] [address] Medicine  Progress Note    Patient Name: Ivy Salgado  MRN: [id number]  Patient Class: OP- Observation   Admission Date: 12/31/2023  Length of Stay: 0 days  Attending Physician: Monique Mckeon MD  Primary Care Provider: Luis Madden DO        Subjective:     Principal Problem:Crohn's disease of small intestine with complication        HPI:  Ivy Salgado is a 43 y.o. female with a PMHx of ARPITA, Crohn's s/p ileostomy on Entyvio who [place] to [facility] [place] of abdominal pain. Patient reprots intermittnet severe generalized abdominal pain for the past 4 days. Pain is worse on her right side and occasionally radiates to her flank. Endorses associated increased ostomy output, poor appetite, and nausea without any vomiting. She feels flushed and had increased temp at home but no fever (reported Tmax 100.2). Symptoms are consistent with prior Crohn's flares, has not had a flare since 2020.  Denies dark/bloody stools, HA, vision changes, chest pain, SOB, LE swelling or syncope.    ED: AFVSS. No leukocytosis or electrolyte abnormalities. ESR 94, CRP 22.8. UA noninfectious. CT abd/pelvis shows slow flow through a few mid to distal small bowel loops noting venous vascular engorgement about these loops and wall hyperemia concerning for inflammatory enteritis. ED provider spoke with GI who recommend holding antibiotics for now and NPO at midnight for potential scope in AM. Given IV dilaudid 1mg x2 and IV compazine.     Overview/Hospital Course:  Ms. Salgado was placed in observation for inflammatory enteritis and acute crohn's flare. C. Diff negative, stool culture without significant growth to date. GI consulted and recommending EGD and ileoscopy.     Interval History: Pt seen and examined by me this morning. BP soft otherwise VSSAF. Holding home nadolol. NAEON. Pt reports persistent abdominal pain (R>L) that is somewhat controlled on current regimen, currently 8/10. GI  consulted. CLD today and NPO at midnight for likely scope tomorrow. Formal note to follow.      Review of Systems   Constitutional:  Positive for appetite change. Negative for chills, fatigue and fever.   HENT:  Negative for trouble swallowing and voice change.    Eyes:  Negative for photophobia and visual disturbance.   Respiratory:  Negative for cough, chest tightness, shortness of breath and wheezing.    Cardiovascular:  Negative for chest pain, palpitations and leg swelling.   Gastrointestinal:  Positive for abdominal pain, diarrhea and nausea. Negative for blood in stool, constipation and vomiting.   Genitourinary:  Negative for difficulty urinating, dysuria, frequency, hematuria and urgency.   Musculoskeletal:  Negative for arthralgias, back pain and gait problem.   Skin:  Negative for color change and rash.   Neurological:  Negative for dizziness, seizures, speech difficulty, weakness, light-headedness and headaches.   Psychiatric/Behavioral:  Negative for behavioral problems, confusion and decreased concentration.      Objective:     Vital Signs (Most Recent):  Temp: 98.5 °F (36.9 °C) (01/02/24 1155)  Pulse: 74 (01/02/24 1155)  Resp: 18 (01/02/24 1215)  BP: 112/62 (01/02/24 1155)  SpO2: 96 % (01/02/24 1155) Vital Signs (24h Range):  Temp:  [98.3 °F (36.8 °C)-99.3 °F (37.4 °C)] 98.5 °F (36.9 °C)  Pulse:  [68-82] 74  Resp:  [17-20] 18  SpO2:  [93 %-100 %] 96 %  BP: ()/(55-68) 112/62     Weight: 50.9 kg (112 lb 3.2 oz)  Body mass index is 21.2 kg/m².    Intake/Output Summary (Last 24 hours) at 1/2/2024 1341  Last data filed at 1/2/2024 0443  Gross per 24 hour   Intake --   Output 370 ml   Net -370 ml           Physical Exam  Vitals and nursing note reviewed.   Constitutional:       General: She is not in acute distress.     Appearance: She is well-developed.   HENT:      Head: Normocephalic and atraumatic.      Mouth/Throat:      Mouth: Mucous membranes are dry.   Eyes:      Extraocular Movements:  Extraocular movements intact.      Conjunctiva/sclera: Conjunctivae normal.   Cardiovascular:      Rate and Rhythm: Normal rate and regular rhythm.      Heart sounds: Normal heart sounds.   Pulmonary:      Effort: Pulmonary effort is normal. No respiratory distress.      Breath sounds: Normal breath sounds. No wheezing.   Abdominal:      General: There is no distension.      Palpations: Abdomen is soft.      Tenderness: There is abdominal tenderness (R-sided).      Comments: Bowel sounds decreased throughout  Ileostomy in place without surrounding erythema or drainage   Musculoskeletal:         General: No tenderness. Normal range of motion.      Cervical back: Normal range of motion and neck supple.   Skin:     General: Skin is warm and dry.      Capillary Refill: Capillary refill takes less than 2 seconds.      Findings: No rash.   Neurological:      Mental Status: She is alert and oriented to person, place, and time.      Cranial Nerves: No cranial nerve deficit.      Sensory: No sensory deficit.      Coordination: Coordination normal.   Psychiatric:         Behavior: Behavior normal.         Thought Content: Thought content normal.         Judgment: Judgment normal.             Significant Labs: All pertinent labs within the past 24 hours have been reviewed.    Significant Imaging: I have reviewed all pertinent imaging results/findings within the past 24 hours.    Assessment/Plan:      * Crohn's disease of small intestine with complication  - presentation consistent with Crohn's flare  - afebrile without leukocytosis  - inflammatory makers elevated and up-trending  - CT abd/pelvis shows inflammatory enteritis without abscess or obstruction   - GI consulted, appreciate recs:  - Plan for EGD with bx to rule out H pylori as well as Ileoscopy on 1/03. Please make npo at midnight. Okay for clear liquids today.  - Continue to hold off on antibiotics and steroids per our inpatient IBD algorithm pending workup.  -  Follow up stool calprotectin  - Follow up stool cx   - Follow up infectious stool studies; C. Diff and E coli negative.  - Follow up full serologic profile.  - Please avoid NSAID's  - Please ensure the patient is on DVT ppx; Lovenox scheduled (MAR reviewed)  - If narcotics are needed, please use morphine; this is the preferred agent in IBD given its short acting nature   - NPO at MN  - Bcx NGTD   - pain and nausea control    Generalized anxiety disorder  - continue klonopin, remeron, and nadolol    S/P ileostomy  - routine ostomy care      VTE Risk Mitigation (From admission, onward)           Ordered     enoxaparin injection 40 mg  Daily         12/31/23 1912     IP VTE LOW RISK PATIENT  Once         12/31/23 1912     Place sequential compression device  Until discontinued         12/31/23 1912                    Discharge Planning   CHRISTOPH: 1/4/2024     Code Status: Full Code   Is the patient medically ready for discharge?: No    Reason for patient still in hospital (select all that apply): Patient trending condition, Treatment, Imaging, and Consult recommendations  Discharge Plan A: Home                  Candice Lee PA-C  Department of Hospital Medicine   [facility] 11H

## 2024-01-02 NOTE — ANESTHESIA PREPROCEDURE EVALUATION
[facility]  Anesthesia Pre-Operative Evaluation         Patient Name: Ivy Salgado  YOB: 1980  MRN: [id number]    SUBJECTIVE:     Pre-operative evaluation for Procedure(s) (LRB):  EGD (ESOPHAGOGASTRODUODENOSCOPY) (N/A)  ILEOSCOPY (N/A)     01/02/2024    Ivy Salgado is a 43 y.o. female w/ a significant PMHx of ARPITA/Depression, GERD, Long QTc syndrome (Type III, on nadolol), s/p SHELLIE (2015) for recurrent ovarian cysts and endometriosis, Crohn's disease with small and large intestine involvement, s/p total proctocolectomy with end ileostomy (6/2012) off all therapies since 2019     Patient now presents for the above procedure(s).    Echo Summary  No results found for this or any previous visit.       Prev airway:       Intubation  Performed by: Marianna Noel CRNA  Authorized by: Matt Weber MD      Intubation:     Induction:  Intravenous    Intubated:  Postinduction    Mask Ventilation:  Easy mask    Attempts:  1    Attempted By:  CRNA    Method of Intubation:  Direct    Blade:  Sarmiento 2    Laryngeal View Grade: Grade I - full view of chords      Difficult Airway Encountered?: No      Complications:  None    Airway Device:  Oral endotracheal tube    Airway Device Size:  7.0    Style/Cuff Inflation:  Cuffed (inflated to minimal occlusive pressure)    Inflation Amount (mL):  5    Tube secured:  22    Secured at:  The lips    Placement Verified By:  Capnometry    Complicating Factors:  None    Findings Post-Intubation:  BS equal bilateral and atraumatic/condition of teeth unchanged           LDA:        PowerPort A Cath Single Lumen 12/31/23 1652 Atrial Left (Active)   Site Assessment No drainage;No redness;No swelling;No warmth 01/02/24 0801   Dressing Status Clean;Dry;Intact 01/02/24 0801   Dressing Intervention Integrity maintained 01/02/24 0801   Patency/Care flushed w/o difficulty;infusing 01/02/24 0801   Number of days: 1            Ileostomy 06/16/12 0000 RLQ (Active)    Number of days: 4217       Drips: None documented.      Patient Active Problem List   Diagnosis    S/P ileostomy    Crohn's disease of small intestine    Generalized anxiety disorder    Herpes genitalis in women    Joint pain    Fatigue    Abdominal pain    Crohn's disease of small intestine with complication    Appetite impaired    Vitamin D deficiency    Palpitations    Multiple thyroid nodules    Urinary tract infection with hematuria    Osteopenia of multiple sites    Diarrhea    Gastroesophageal reflux disease    Poor venous access    Alkaline phosphatase elevation- based on lab from 4/9/2019     S/P ExLap, BSO, 5/30/19    Premature surgical menopause    History of recurrent UTI (urinary tract infection)    Pelvic pain in female    Left ureteral stone    Ureteral stone    Pain due to ureteral stent    History of prolonged Q-T interval on ECG    Pyelonephritis    Fall    Left shoulder pain    Right foot pain    Avascular necrosis of bone of shoulder    Biceps tendonitis on left    Decreased range of motion of left shoulder    Hematuria    Hypocalcemia    S/P shoulder surgery       Review of patient's allergies indicates:   Allergen Reactions    Azathioprine sodium Other (See Comments)     Other reaction(s): pancreatitis  Other reaction(s): pancreatitis    Methotrexate analogues Other (See Comments)     leukopenia    Stelara [ustekinumab] Other (See Comments)     Multiple infections    Zofran [ondansetron hcl (pf)] Other (See Comments)     Per patient causes prolong QT    Vancomycin analogues Other (See Comments)     Made her red    Azathioprine      Other reaction(s): Unknown    Methotrexate      Other reaction(s): infection-    Morphine Itching and Other (See Comments)     Other reaction(s): Itching    Zofran [ondansetron hcl]      Other reaction(s): Hives    Bactrim [sulfamethoxazole-trimethoprim] Palpitations    Ciprofloxacin Palpitations       Current Inpatient Medications:   clonazePAM  1 mg Oral BID     droNABinol  5 mg Oral BID AC    enoxparin  40 mg Subcutaneous Daily    ferrous sulfate  1 tablet Oral Daily    gabapentin  300 mg Oral BID    mirtazapine  30 mg Oral Nightly    multivitamin  1 tablet Oral Daily    [START ON 1/3/2024] nadoloL  40 mg Oral Daily    pantoprazole  40 mg Oral BID       No current facility-administered medications on file prior to encounter.     Current Outpatient Medications on File Prior to Encounter   Medication Sig Dispense Refill    clonazePAM (KLONOPIN) 1 MG tablet Take 1 tablet (1 mg total) by mouth 2 (two) times daily. 60 tablet 2    cyclobenzaprine (FLEXERIL) 10 MG tablet Take 1 tablet (10 mg total) by mouth every evening as needed for muscle pain 15 tablet 0    droNABinol (MARINOL) 5 MG capsule Take 1 capsule (5 mg total) by mouth 2 (two) times daily before meals. 60 capsule 1    estradioL (ESTRACE) 0.01 % (0.1 mg/gram) vaginal cream Place 1 g vaginally once daily. 30 g 12    flu vacc [id number] 6mos up,PF, (FLUARIX QUAD [id number], PF,) 60 mcg (15 mcg x 4)/0.5 mL Syrg inject into muscle for one dose 0.5 mL 0    fluconazole (DIFLUCAN) 150 MG Tab Take 1 tablet (150 mg total) by mouth every 72 hours as needed (vaginal yeast). 3 tablet 0    gabapentin (NEURONTIN) 300 MG capsule Take 1 capsule (300 mg total) by mouth 2 (two) times daily. 60 capsule 11    HYDROcodone-acetaminophen (NORCO) 7.5-325 mg per tablet Take 1 tablet by mouth every 24 hours as needed for Pain. 7 tablet 0    loperamide (IMODIUM) 2 mg capsule Take 2 mg by mouth daily as needed for Diarrhea.      mirtazapine (REMERON SOL-TAB) 30 MG disintegrating tablet Take 1 tablet (30 mg total) by mouth nightly. 30 tablet 0    multivitamin (THERAGRAN) per tablet Take 1 tablet by mouth once daily.      nadoloL (CORGARD) 40 MG tablet Take 1 tablet (40 mg total) by mouth once daily. Patient needs appointment before next refill. 30 tablet 0    pantoprazole (PROTONIX) 40 MG tablet Take 1 tablet (40 mg total) by mouth 2 (two) times  daily. 60 tablet 12    promethazine (PHENERGAN) 25 MG tablet TAKE 1 TABLET BY MOUTH EVERY 8 HOURS FOR NAUSEA 30 tablet 1    tamsulosin (FLOMAX) 0.4 mg Cap Take 1 capsule (0.4 mg total) by mouth once daily. 30 capsule 1    triamcinolone acetonide 0.1% (KENALOG) 0.1 % cream Apply topically 2 (two) times daily.      valACYclovir (VALTREX) 500 MG tablet Take 1 tablet (500 mg total) by mouth once daily. 90 tablet 3    vedolizumab (ENTYVIO) 300 mg SolR injection Inject 300 mg into the vein.      zolpidem (AMBIEN) 10 mg Tab Take 1 tablet (10 mg total) by mouth every evening. 30 tablet 2       Past Surgical History:   Procedure Laterality Date    ABDOMINAL SURGERY      APPENDECTOMY      ARTHROPLASTY OF SHOULDER Left 7/18/2023    Procedure: ARTHROPLASTY, SHOULDER;  Surgeon: Sharon Babb MD;  Location: [facility];  Service: Orthopedics;  Laterality: Left;    AUGMENTATION OF BREAST Bilateral 06/2022    gel implants    BILATERAL SALPINGO-OOPHORECTOMY (BSO) Bilateral 05/30/2019    Procedure: SALPINGO-OOPHORECTOMY, BILATERAL;  Surgeon: Rupa German MD;  Location: [facility] OR [address] [place] [address];  Service: OB/GYN;  Laterality: Bilateral;    BLADDER SURGERY      partial cystectomy due to fistula    breast lift      BREAST SURGERY      [facility]      COLON SURGERY      COLONOSCOPY      CYSTOSCOPY  09/23/2020    Procedure: CYSTOSCOPY;  Surgeon: Sascha Florentino MD;  Location: [facility] OR [address] [place] [address];  Service: Urology;;    CYSTOSCOPY W/ URETERAL STENT PLACEMENT Left 09/15/2020    Procedure: CYSTOSCOPY, WITH URETERAL STENT INSERTION;  Surgeon: Sascha Florentino MD;  [facility];  Service: Urology;  Laterality: Left;    DIAGNOSTIC LAPAROSCOPY N/A 07/09/2020    Procedure: LAPAROSCOPY, DIAGNOSTIC;  Surgeon: Gilson El MD;  Location: [facility] OR;  Service: OB/GYN;  Laterality: N/A;    EXCISION OF MELANOMA  07/17/2019    ILEOSTOMY      LAPAROSCOPIC LYSIS OF ADHESIONS N/A 07/09/2020    Procedure: LYSIS, ADHESIONS, LAPAROSCOPIC;  Surgeon: Gilson  DUNCAN El MD;  Location: [facility] OR;  Service: OB/GYN;  Laterality: N/A;    LASER LITHOTRIPSY  09/23/2020    Procedure: LITHOTRIPSY, USING LASER;  Surgeon: Sascha Florentino MD;  Location: [facility] OR 1ST FLR;  Service: Urology;;    LYSIS OF ADHESIONS N/A 05/30/2019    Procedure: LYSIS, ADHESIONS;  Surgeon: Rupa German MD;  Location: [facility] OR 2ND FLR;  Service: OB/GYN;  Laterality: N/A;    OOPHORECTOMY Right 04/16/2015    PORTACATH PLACEMENT  02/21/2017    SKIN BIOPSY      SMALL INTESTINE SURGERY      age 18 Y    TOTAL ABDOMINAL HYSTERECTOMY  04/16/2015    TOTAL COLECTOMY      TUBAL LIGATION  06/06/2012    UPPER GASTROINTESTINAL ENDOSCOPY      URETEROSCOPIC REMOVAL OF URETERIC CALCULUS  09/23/2020    Procedure: REMOVAL, CALCULUS, URETER, URETEROSCOPIC;  Surgeon: Sascha Florentino MD;  [facility];  Service: Urology;;    URETEROSCOPY Left 09/23/2020    Procedure: URETEROSCOPY;  Surgeon: Sascha Florentino MD;  [facility];  Service: Urology;  Laterality: Left;       Social History:  Tobacco Use: Low Risk  (12/31/2023)    Patient History     Smoking Tobacco Use: Never     Smokeless Tobacco Use: Never     Passive Exposure: Not on file      Alcohol Use: Not At Risk (10/24/2023)    AUDIT-C     Frequency of Alcohol Consumption: Never     Average Number of Drinks: Patient does not drink     Frequency of Binge Drinking: Never        OBJECTIVE:     Vital Signs Range (Last 24H):  Temp:  [36.8 °C (98.3 °F)-37.4 °C (99.3 °F)]   Pulse:  [68-82]   Resp:  [17-20]   BP: ()/(55-68)   SpO2:  [93 %-100 %]       Significant Labs:  Lab Results   Component Value Date    WBC 5.62 01/02/2024    HGB 10.8 (L) 01/02/2024    HCT 33.5 (L) 01/02/2024     01/02/2024    CHOL 192 01/02/2024    TRIG 150 01/02/2024    HDL 40 01/02/2024    ALT 34 01/02/2024    AST 52 (H) 01/02/2024     01/02/2024    K 3.4 (L) 01/02/2024     01/02/2024    CREATININE 0.8 01/02/2024    BUN 7 01/02/2024    CO2 27  01/02/2024    TSH 2.386 01/02/2024    INR 1.1 02/02/2018    HGBA1C 5.1 01/01/2024       Diagnostic Studies: No relevant studies.    EKG:   Results for orders placed or performed during the hospital encounter of 12/31/23   EKG 12-lead    Collection Time: 12/31/23  4:18 PM    Narrative    Test Reason : R11.0,    Vent. Rate : 085 BPM     Atrial Rate : 085 BPM     P-R Int : 120 ms          QRS Dur : 068 ms      QT Int : 376 ms       P-R-T Axes : 053 044 055 degrees     QTc Int : 447 ms    Normal sinus rhythm  Normal ECG  When compared with ECG of 30-OCT-2023 14:39,  Nonspecific T wave abnormality no longer evident in Inferior leads  Confirmed by EDENILSON LOZANO MD ([id number]) on 1/1/2024 10:28:38 PM    Referred By: [email or web address]   SELF           Confirmed By:EDENILSON LOZANO MD       2D ECHO:  TTE:  No results found. However, due to the size of the patient record, not all encounters were searched. Please check Results Review for a complete set of results.    MARCIE:  No results found. However, due to the size of the patient record, not all encounters were searched. Please check Results Review for a complete set of results.    ASSESSMENT/PLAN:         Pre-op Assessment    I have reviewed the Patient Summary Reports.     I have reviewed the Nursing Notes. I have reviewed the NPO Status.   I have reviewed the Medications.     Review of Systems  Anesthesia Hx:  No problems with previous Anesthesia             Denies Family Hx of Anesthesia complications.    Denies Personal Hx of Anesthesia complications.                    Social:  Non-Smoker, No Alcohol Use       Cardiovascular:  Exercise tolerance: good   Hypertension     Denies Dysrhythmias.   Denies Angina.       Denies BABCOCK.                            Pulmonary:    Denies COPD.  Denies Asthma.                    Renal/:   renal calculi               Hepatic/GI:     GERD             Neurological:    Denies CVA.    Seizures                                Endocrine:  Denies Diabetes.            Psych:   anxiety depression                Physical Exam  General: Well nourished, Cooperative, Alert and Oriented    Airway:  Mallampati: I   Mouth Opening: Normal  Tongue: Normal  Neck ROM: Normal ROM    Dental:  Intact  BOTTOM RIGHT 2ND TOOTH CRACKED      Anesthesia Plan  Type of Anesthesia, risks & benefits discussed:    Anesthesia Type: Gen ETT, Gen Supraglottic Airway, Gen Natural Airway, MAC  Intra-op Monitoring Plan: Standard ASA Monitors  Post Op Pain Control Plan: multimodal analgesia and IV/PO Opioids PRN  Induction:  IV  Airway Plan: Direct and Video, Post-Induction  Informed Consent: Informed consent signed with the Patient and all parties understand the risks and agree with anesthesia plan.  All questions answered.   ASA Score: 3  Day of Surgery Review of History & Physical: H&P Update referred to the surgeon/provider.    Ready For Surgery From Anesthesia Perspective.     .

## 2024-01-03 ENCOUNTER — ANESTHESIA (OUTPATIENT)
Dept: ENDOSCOPY | Facility: HOSPITAL | Age: 44
DRG: 386 | End: 2024-01-03
Payer: COMMERCIAL

## 2024-01-03 PROBLEM — R74.01 TRANSAMINITIS: Status: ACTIVE | Noted: 2024-01-03

## 2024-01-03 LAB
ALBUMIN SERPL BCP-MCNC: 2.7 G/DL (ref 3.5–5.2)
ALP SERPL-CCNC: 245 U/L (ref 55–135)
ALT SERPL W/O P-5'-P-CCNC: 44 U/L (ref 10–44)
ANION GAP SERPL CALC-SCNC: 8 MMOL/L (ref 8–16)
AST SERPL-CCNC: 79 U/L (ref 10–40)
BACTERIA STL CULT: NORMAL
BASOPHILS # BLD AUTO: 0.02 K/UL (ref 0–0.2)
BASOPHILS NFR BLD: 0.4 % (ref 0–1.9)
BILIRUB SERPL-MCNC: 0.4 MG/DL (ref 0.1–1)
BUN SERPL-MCNC: 4 MG/DL (ref 6–20)
CALCIUM SERPL-MCNC: 8.6 MG/DL (ref 8.7–10.5)
CHLORIDE SERPL-SCNC: 107 MMOL/L (ref 95–110)
CO2 SERPL-SCNC: 26 MMOL/L (ref 23–29)
CREAT SERPL-MCNC: 0.7 MG/DL (ref 0.5–1.4)
DIFFERENTIAL METHOD BLD: ABNORMAL
EOSINOPHIL # BLD AUTO: 0.1 K/UL (ref 0–0.5)
EOSINOPHIL NFR BLD: 2.5 % (ref 0–8)
ERYTHROCYTE [DISTWIDTH] IN BLOOD BY AUTOMATED COUNT: 13.5 % (ref 11.5–14.5)
EST. GFR  (NO RACE VARIABLE): >60 ML/MIN/1.73 M^2
GAMMA INTERFERON BACKGROUND BLD IA-ACNC: 0.06 IU/ML
GLUCOSE SERPL-MCNC: 99 MG/DL (ref 70–110)
HCT VFR BLD AUTO: 32.2 % (ref 37–48.5)
HGB BLD-MCNC: 10.6 G/DL (ref 12–16)
IMM GRANULOCYTES # BLD AUTO: 0.01 K/UL (ref 0–0.04)
IMM GRANULOCYTES NFR BLD AUTO: 0.2 % (ref 0–0.5)
LYMPHOCYTES # BLD AUTO: 1.9 K/UL (ref 1–4.8)
LYMPHOCYTES NFR BLD: 39.2 % (ref 18–48)
M TB IFN-G CD4+ BCKGRND COR BLD-ACNC: 0.01 IU/ML
M TB IFN-G CD4+ BCKGRND COR BLD-ACNC: 0.01 IU/ML
MAGNESIUM SERPL-MCNC: 1.9 MG/DL (ref 1.6–2.6)
MCH RBC QN AUTO: 30 PG (ref 27–31)
MCHC RBC AUTO-ENTMCNC: 32.9 G/DL (ref 32–36)
MCV RBC AUTO: 91 FL (ref 82–98)
MITOGEN IGNF BCKGRD COR BLD-ACNC: 9.94 IU/ML
MONOCYTES # BLD AUTO: 0.8 K/UL (ref 0.3–1)
MONOCYTES NFR BLD: 15.9 % (ref 4–15)
NEUTROPHILS # BLD AUTO: 2 K/UL (ref 1.8–7.7)
NEUTROPHILS NFR BLD: 41.8 % (ref 38–73)
NRBC BLD-RTO: 0 /100 WBC
O+P STL MICRO: NORMAL
PHOSPHATE SERPL-MCNC: 4.4 MG/DL (ref 2.7–4.5)
PLATELET # BLD AUTO: 300 K/UL (ref 150–450)
PMV BLD AUTO: 9.4 FL (ref 9.2–12.9)
POTASSIUM SERPL-SCNC: 3.8 MMOL/L (ref 3.5–5.1)
PROT SERPL-MCNC: 6.5 G/DL (ref 6–8.4)
RBC # BLD AUTO: 3.53 M/UL (ref 4–5.4)
SODIUM SERPL-SCNC: 141 MMOL/L (ref 136–145)
TB GOLD PLUS: NEGATIVE
WBC # BLD AUTO: 4.77 K/UL (ref 3.9–12.7)

## 2024-01-03 PROCEDURE — 88305 TISSUE EXAM BY PATHOLOGIST: CPT | Mod: 26,,, | Performed by: PATHOLOGY

## 2024-01-03 PROCEDURE — 25000003 PHARM REV CODE 250: Performed by: PHYSICIAN ASSISTANT

## 2024-01-03 PROCEDURE — 44382 SMALL BOWEL ENDOSCOPY: CPT | Performed by: INTERNAL MEDICINE

## 2024-01-03 PROCEDURE — 21400001 HC TELEMETRY ROOM

## 2024-01-03 PROCEDURE — 88305 TISSUE EXAM BY PATHOLOGIST: CPT | Mod: 59 | Performed by: PATHOLOGY

## 2024-01-03 PROCEDURE — 37000008 HC ANESTHESIA 1ST 15 MINUTES: Performed by: INTERNAL MEDICINE

## 2024-01-03 PROCEDURE — 99233 SBSQ HOSP IP/OBS HIGH 50: CPT | Mod: ,,, | Performed by: INTERNAL MEDICINE

## 2024-01-03 PROCEDURE — 27201012 HC FORCEPS, HOT/COLD, DISP: Performed by: INTERNAL MEDICINE

## 2024-01-03 PROCEDURE — 88342 IMHCHEM/IMCYTCHM 1ST ANTB: CPT | Performed by: PATHOLOGY

## 2024-01-03 PROCEDURE — 63600175 PHARM REV CODE 636 W HCPCS: Performed by: PHYSICIAN ASSISTANT

## 2024-01-03 PROCEDURE — 88342 IMHCHEM/IMCYTCHM 1ST ANTB: CPT | Mod: 26,,, | Performed by: PATHOLOGY

## 2024-01-03 PROCEDURE — 83735 ASSAY OF MAGNESIUM: CPT | Performed by: HOSPITALIST

## 2024-01-03 PROCEDURE — D9220A PRA ANESTHESIA: Mod: ANES,,, | Performed by: STUDENT IN AN ORGANIZED HEALTH CARE EDUCATION/TRAINING PROGRAM

## 2024-01-03 PROCEDURE — 37000009 HC ANESTHESIA EA ADD 15 MINS: Performed by: INTERNAL MEDICINE

## 2024-01-03 PROCEDURE — D9220A PRA ANESTHESIA: Mod: CRNA,,, | Performed by: NURSE ANESTHETIST, CERTIFIED REGISTERED

## 2024-01-03 PROCEDURE — 84100 ASSAY OF PHOSPHORUS: CPT | Performed by: HOSPITALIST

## 2024-01-03 PROCEDURE — 0DB68ZX EXCISION OF STOMACH, VIA NATURAL OR ARTIFICIAL OPENING ENDOSCOPIC, DIAGNOSTIC: ICD-10-PCS | Performed by: INTERNAL MEDICINE

## 2024-01-03 PROCEDURE — 43239 EGD BIOPSY SINGLE/MULTIPLE: CPT | Mod: ,,, | Performed by: INTERNAL MEDICINE

## 2024-01-03 PROCEDURE — 85025 COMPLETE CBC W/AUTO DIFF WBC: CPT | Performed by: HOSPITALIST

## 2024-01-03 PROCEDURE — 0DBB8ZX EXCISION OF ILEUM, VIA NATURAL OR ARTIFICIAL OPENING ENDOSCOPIC, DIAGNOSTIC: ICD-10-PCS | Performed by: INTERNAL MEDICINE

## 2024-01-03 PROCEDURE — 25000003 PHARM REV CODE 250: Performed by: FAMILY MEDICINE

## 2024-01-03 PROCEDURE — 63600175 PHARM REV CODE 636 W HCPCS: Performed by: NURSE ANESTHETIST, CERTIFIED REGISTERED

## 2024-01-03 PROCEDURE — 25000003 PHARM REV CODE 250

## 2024-01-03 PROCEDURE — 25000003 PHARM REV CODE 250: Performed by: NURSE ANESTHETIST, CERTIFIED REGISTERED

## 2024-01-03 PROCEDURE — 44382 SMALL BOWEL ENDOSCOPY: CPT | Mod: 51,,, | Performed by: INTERNAL MEDICINE

## 2024-01-03 PROCEDURE — 43239 EGD BIOPSY SINGLE/MULTIPLE: CPT | Performed by: INTERNAL MEDICINE

## 2024-01-03 PROCEDURE — 80053 COMPREHEN METABOLIC PANEL: CPT | Performed by: HOSPITALIST

## 2024-01-03 PROCEDURE — 36415 COLL VENOUS BLD VENIPUNCTURE: CPT | Performed by: HOSPITALIST

## 2024-01-03 RX ORDER — MIDAZOLAM HYDROCHLORIDE 1 MG/ML
INJECTION, SOLUTION INTRAMUSCULAR; INTRAVENOUS
Status: DISCONTINUED | OUTPATIENT
Start: 2024-01-03 | End: 2024-01-03

## 2024-01-03 RX ORDER — LIDOCAINE HYDROCHLORIDE 20 MG/ML
INJECTION, SOLUTION EPIDURAL; INFILTRATION; INTRACAUDAL; PERINEURAL
Status: DISCONTINUED | OUTPATIENT
Start: 2024-01-03 | End: 2024-01-03

## 2024-01-03 RX ORDER — SODIUM CHLORIDE 0.9 % (FLUSH) 0.9 %
10 SYRINGE (ML) INJECTION
Status: DISCONTINUED | OUTPATIENT
Start: 2024-01-03 | End: 2024-01-03 | Stop reason: HOSPADM

## 2024-01-03 RX ORDER — PROPOFOL 10 MG/ML
VIAL (ML) INTRAVENOUS
Status: DISCONTINUED | OUTPATIENT
Start: 2024-01-03 | End: 2024-01-03

## 2024-01-03 RX ORDER — PHENYLEPHRINE HYDROCHLORIDE 10 MG/ML
INJECTION INTRAVENOUS
Status: DISCONTINUED | OUTPATIENT
Start: 2024-01-03 | End: 2024-01-03

## 2024-01-03 RX ADMIN — PANTOPRAZOLE SODIUM 40 MG: 40 TABLET, DELAYED RELEASE ORAL at 11:01

## 2024-01-03 RX ADMIN — SODIUM CHLORIDE: 0.9 INJECTION, SOLUTION INTRAVENOUS at 08:01

## 2024-01-03 RX ADMIN — MIRTAZAPINE 30 MG: 15 TABLET, ORALLY DISINTEGRATING ORAL at 09:01

## 2024-01-03 RX ADMIN — MORPHINE SULFATE 30 MG: 15 TABLET ORAL at 05:01

## 2024-01-03 RX ADMIN — DRONABINOL 5 MG: 2.5 CAPSULE ORAL at 05:01

## 2024-01-03 RX ADMIN — PHENYLEPHRINE HYDROCHLORIDE 100 MCG: 10 INJECTION INTRAVENOUS at 08:01

## 2024-01-03 RX ADMIN — THERA TABS 1 TABLET: TAB at 11:01

## 2024-01-03 RX ADMIN — GABAPENTIN 300 MG: 300 CAPSULE ORAL at 11:01

## 2024-01-03 RX ADMIN — NADOLOL 40 MG: 40 TABLET ORAL at 09:01

## 2024-01-03 RX ADMIN — MORPHINE SULFATE 30 MG: 15 TABLET ORAL at 01:01

## 2024-01-03 RX ADMIN — MORPHINE SULFATE 30 MG: 15 TABLET ORAL at 10:01

## 2024-01-03 RX ADMIN — PROPOFOL 100 MG: 10 INJECTION, EMULSION INTRAVENOUS at 08:01

## 2024-01-03 RX ADMIN — ENOXAPARIN SODIUM 40 MG: 40 INJECTION SUBCUTANEOUS at 05:01

## 2024-01-03 RX ADMIN — CLONAZEPAM 1 MG: 0.5 TABLET ORAL at 11:01

## 2024-01-03 RX ADMIN — HYDROMORPHONE HYDROCHLORIDE 0.5 MG: 1 INJECTION, SOLUTION INTRAMUSCULAR; INTRAVENOUS; SUBCUTANEOUS at 05:01

## 2024-01-03 RX ADMIN — MIDAZOLAM HYDROCHLORIDE 2 MG: 1 INJECTION, SOLUTION INTRAMUSCULAR; INTRAVENOUS at 08:01

## 2024-01-03 RX ADMIN — GABAPENTIN 300 MG: 300 CAPSULE ORAL at 09:01

## 2024-01-03 RX ADMIN — PROPOFOL 20 MG: 10 INJECTION, EMULSION INTRAVENOUS at 08:01

## 2024-01-03 RX ADMIN — LIDOCAINE HYDROCHLORIDE 50 MG: 20 INJECTION, SOLUTION EPIDURAL; INFILTRATION; INTRACAUDAL; PERINEURAL at 08:01

## 2024-01-03 RX ADMIN — CLONAZEPAM 1 MG: 0.5 TABLET ORAL at 09:01

## 2024-01-03 RX ADMIN — PANTOPRAZOLE SODIUM 40 MG: 40 TABLET, DELAYED RELEASE ORAL at 09:01

## 2024-01-03 RX ADMIN — FERROUS SULFATE TAB EC 325 MG (65 MG FE EQUIVALENT) 1 EACH: 325 (65 FE) TABLET DELAYED RESPONSE at 11:01

## 2024-01-03 RX ADMIN — PROCHLORPERAZINE EDISYLATE 5 MG: 5 INJECTION INTRAMUSCULAR; INTRAVENOUS at 01:01

## 2024-01-03 NOTE — ASSESSMENT & PLAN NOTE
- AST 52 -> 79, Alk Phos 170 -> 245, ALT wnl  - Abdominal U/S revealed mildly dilated bile ducts in the central right hepatic lobe.   - GI consulted, appreciate recs

## 2024-01-03 NOTE — TREATMENT PLAN
GI Post Procedure Treatment Plan  Procedure Performed: EGD/Ileoscopy    EGD  Impression:    - Normal esophagus.                          - Erythematous mucosa in the prepyloric region of                          the stomach. Biopsied.                          - Normal examined duodenum.   Ileoscopy  Impression:     - A few ulcers in the distal ileum. Biopsied.     Recommendation:                                 - Return patient to hospital [facility] for ongoing care.                          - Resume previous diet today.                          - Await pathology results.      Beronica Holguin MD  Gastroenterology, PGY-4

## 2024-01-03 NOTE — ASSESSMENT & PLAN NOTE
- presentation consistent with Crohn's flare  - afebrile without leukocytosis  - inflammatory makers elevated and up-trending  - CT abd/pelvis shows inflammatory enteritis without abscess or obstruction   - EGD with erythematous mucosa in the prepyloric region of the stomach. Ileoscopy revealed ulcers in the distal ileum. Biopsies pending.   - Abdominal U/S revealed mildly dilated bile ducts in the central right hepatic lobe.   - GI consulted, appreciate recs  - Bcx NGTD   - pain and nausea control

## 2024-01-03 NOTE — PROGRESS NOTES
[facility]  Gastroenterology  Progress Note    Patient Name: Ivy Salgado  MRN: [id number]  Admission Date: 12/31/2023  Hospital Length of Stay: 1 days  Code Status: Full Code   Attending Provider: Monique Mckeon MD  Consulting Provider: Beronica Holguin MD  Primary Care Physician: Luis Madden DO  Principal Problem: Crohn's disease of small intestine with complication        Subjective:     Interval History:   S/p EGD and Ileoscopy on 01/03 that showed a few ulcers in the distal ileum along with erythematous mucosa in the prepyloric region. Liver enzymes rising, 52 to 79 and  to 245. No abdominal pain, new medications, herbal supplements.     Review of Systems   Constitutional:  Negative for chills and fever.   Gastrointestinal:  Negative for abdominal pain, nausea and vomiting.     Objective:     Vital Signs (Most Recent):  Temp: 99.1 °F (37.3 °C) (01/03/24 1159)  Pulse: 68 (01/03/24 1501)  Resp: 16 (01/03/24 1312)  BP: (!) 129/90 (01/03/24 1159)  SpO2: 97 % (01/03/24 1159) Vital Signs (24h Range):  Temp:  [98 °F (36.7 °C)-100 °F (37.8 °C)] 99.1 °F (37.3 °C)  Pulse:  [68-92] 68  Resp:  [10-20] 16  SpO2:  [95 %-98 %] 97 %  BP: ()/(53-90) 129/90     Weight: 50.9 kg (112 lb 3.2 oz) (01/01/24 0330)  Body mass index is 21.2 kg/m².      Intake/Output Summary (Last 24 hours) at 1/3/2024 1622  Last data filed at 1/3/2024 0830  Gross per 24 hour   Intake 560 ml   Output 1465 ml   Net -905 ml       Lines/Drains/Airways       Central Venous Catheter Line  Duration                  PowerPort A Cath Single Lumen 12/31/23 1652 Atrial Left 2 days              Drain  Duration                  Ileostomy 06/16/12 0000 RLQ 4218 days                     Physical Exam  Vitals reviewed.   Constitutional:       General: She is not in acute distress.     Appearance: Normal appearance. She is not ill-appearing.   Eyes:      Extraocular Movements: Extraocular movements intact.   Cardiovascular:       Rate and Rhythm: Normal rate.   Pulmonary:      Effort: Pulmonary effort is normal.   Abdominal:      General: There is no distension.      Palpations: Abdomen is soft.      Tenderness: There is no abdominal tenderness. There is no guarding.   Neurological:      Mental Status: She is alert. Mental status is at baseline.          Significant Labs:  All pertinent lab results from the last 24 hours have been reviewed.      Assessment/Plan:     GI  * Crohn's disease of small intestine with complication  Ivy Salgado is a 43 y.o. female  with ARPITA/Depression, GERD, Long QTc syndrome (Type III, on nadolol), s/p SHELLIE () for recurrent ovarian cysts and endometriosis, history of melanoma in situ (buttocks and para-stomal site, excised in 2018 and  respectively) and Crohn's disease with small and large intestine involvement (diagnosed in ; terminal ileum involvement per patient) s/p total proctocolectomy with end ileostomy (2012) off all therapies since 2019 that [place] [facility] [place]  for evaluation of intermittent generalized abdominal pain (more concentrated in the RUQ and RLQ), increased ostomy output, poor po intake and nausea without vomiting for the last week. GI consulted for concerns for an IBD flare.      Most Recent Imagin23 CT A/P with contrast-  There is decompression of a few scattered mid small bowel loops noting distension and slow flow of the distal small bowel and associated wall hyperemia.  The ileostomy appears patent there is scattered interloop fluid and venous vascular engorgement involving the distended bowel loops, no findings to suggest abscess.        Most Recent Endoscopies:   2017- Ileoscopy- The examined portion of the ileum was normal.   Biopsied. Widely patent end ileostomy with healthy appearing mucosa at the ileostomy. Fragments of unremarkable small bowel mucosa. No evidence of active colitis, granuloma, dysplasia or malignancy     Problem List:  Crohn's Disease  of the small and large intestines, s/p total proctocolectomy with end ileostomy (2012), post-op course complicated by perineal wound for ~1 year (now healed), currently off all therapies    Mild Malnutrition (Albumin 3.3)   Normocytic Anemia   GERD  History of colovesical fistula s/p operative management (1992)  History of abdominal abscess s/p operative management (1998)   Drug induced pancreatitis 2/2 Imuran   Elevated CRP   Elevated AST and ALP     CRP Trend:   1.8 (1/2023) -> 0.9 (7/2023) -> 9.3 (12/2023)-> 22.8 (12/31/23)->33.2 (01/02/24)     Recommendations:  - S/p EGD and Ileoscopy on 01/03 that showed a few ulcers in the distal ileum along with erythematous mucosa in the prepyloric region. Will follow up biopsy results. Discussed re-starting Entyvio.   - Rising liver enzymes, will order serologic workup with SAURAV, AMA, Antismooth muscle, RUQ US.   - Continue to hold off on antibiotics and steroids per our inpatient IBD algorithm pending workup.  - Follow up stool calprotectin  - Follow up infectious stool studies; C. Diff and E coli negative.  - Follow up full serologic profile.  - Please avoid NSAID's  - Please ensure the patient is on DVT ppx; Lovenox scheduled (MAR reviewed)  - If narcotics are needed, please use morphine; this is the preferred agent in IBD given its short acting nature         Thank you for your consult. I will follow-up with patient. Please contact us if you have any additional questions.    Beronica Holguin MD  Gastroenterology  [facility] - Observation 11H

## 2024-01-03 NOTE — PROVATION PATIENT INSTRUCTIONS
Discharge Summary/Instructions after an Endoscopic Procedure  Patient Name: Ivy Salgado  Patient MRN: [id number]  Patient YOB: 1980  Wednesday, January 3, 2024  Lily Lind MD  Dear patient,  As a result of recent federal legislation (The Federal Cures Act), you may   receive lab or pathology results from your procedure in your [facility]   account before your physician is able to contact you. Your physician or   their representative will relay the results to you with their   recommendations at their soonest availability.  Thank you,  RESTRICTIONS:  During your procedure today, you received medications for sedation.  These   medications may affect your judgment, balance and coordination.  Therefore,   for 24 hours, you have the following restrictions:   - DO NOT drive a car, operate machinery, make legal/financial decisions,   sign important papers or drink alcohol.    ACTIVITY:  Today: no heavy lifting, straining or running due to procedural   sedation/anesthesia.  The following day: return to full activity including work.  DIET:  Eat and drink normally unless instructed otherwise.     TREATMENT FOR COMMON SIDE EFFECTS:  - Mild abdominal pain, nausea, belching, bloating or excessive gas:  rest,   eat lightly and use a heating pad.  - Sore Throat: treat with throat lozenges and/or gargle with warm salt   water.  - Because air was used during the procedure, expelling large amounts of air   from your rectum or belching is normal.  - If a bowel prep was taken, you may not have a bowel movement for 1-3 days.    This is normal.  SYMPTOMS TO WATCH FOR AND REPORT TO YOUR PHYSICIAN:  1. Abdominal pain or bloating, other than gas cramps.  2. Chest pain.  3. Back pain.  4. Signs of infection such as: chills or fever occurring within 24 hours   after the procedure.  5. Rectal bleeding, which would show as bright red, maroon, or black stools.   (A tablespoon of blood from the rectum is not serious, especially  if   hemorrhoids are present.)  6. Vomiting.  7. Weakness or dizziness.  GO DIRECTLY TO THE NEAREST EMERGENCY ROOM IF YOU HAVE ANY OF THE FOLLOWING:      Difficulty breathing              Chills and/or fever over 101 F   Persistent vomiting and/or vomiting blood   Severe abdominal pain   Severe chest pain   Black, tarry stools   Bleeding- more than one tablespoon   Any other symptom or condition that you feel may need urgent attention  Your doctor recommends these additional instructions:  If any biopsies were taken, your doctors clinic will contact you in 1 to 2   weeks with any results.  - Return patient to hospital [facility] for ongoing care.   - Resume previous diet today.   - Await pathology results.   For questions, problems or results please call your physician - Lily Lind MD at Work:  [phone].  [facility], EMERGENCY ROOM PHONE NUMBER: [phone]  IF A COMPLICATION OR EMERGENCY SITUATION ARISES AND YOU ARE UNABLE TO REACH   YOUR PHYSICIAN - GO DIRECTLY TO THE EMERGENCY ROOM.  Lily Lind MD  1/3/2024 8:37:28 AM  This report has been verified and signed electronically.  Dear patient,  As a result of recent federal legislation (The Federal Cures Act), you may   receive lab or pathology results from your procedure in your [facility]   account before your physician is able to contact you. Your physician or   their representative will relay the results to you with their   recommendations at their soonest availability.  Thank you,  PROVATION

## 2024-01-03 NOTE — ASSESSMENT & PLAN NOTE
Ivy Salgado is a 43 y.o. female  with ARPITA/Depression, GERD, Long QTc syndrome (Type III, on nadolol), s/p SHELLIE () for recurrent ovarian cysts and endometriosis, history of melanoma in situ (buttocks and para-stomal site, excised in  and  respectively) and Crohn's disease with small and large intestine involvement (diagnosed in ; terminal ileum involvement per patient) s/p total proctocolectomy with end ileostomy (2012) off all therapies since  that presents to [facility] on  for evaluation of intermittent generalized abdominal pain (more concentrated in the RUQ and RLQ), increased ostomy output, poor po intake and nausea without vomiting for the last week. GI consulted for concerns for an IBD flare.      Most Recent Imagin23 CT A/P with contrast-  There is decompression of a few scattered mid small bowel loops noting distension and slow flow of the distal small bowel and associated wall hyperemia.  The ileostomy appears patent there is scattered interloop fluid and venous vascular engorgement involving the distended bowel loops, no findings to suggest abscess.        Most Recent Endoscopies:   2017- Ileoscopy- The examined portion of the ileum was normal.   Biopsied. Widely patent end ileostomy with healthy appearing mucosa at the ileostomy. Fragments of unremarkable small bowel mucosa. No evidence of active colitis, granuloma, dysplasia or malignancy     Problem List:  Crohn's Disease of the small and large intestines, s/p total proctocolectomy with end ileostomy (), post-op course complicated by perineal wound for ~1 year (now healed), currently off all therapies    Mild Malnutrition (Albumin 3.3)   Normocytic Anemia   GERD  History of colovesical fistula s/p operative management ()  History of abdominal abscess s/p operative management ()   Drug induced pancreatitis 2/2 Imuran   Elevated CRP   Elevated AST and ALP     CRP Trend:   1.8 (2023) -> 0.9 (2023) -> 9.3  (12/2023)-> 22.8 (12/31/23)->33.2 (01/02/24)     Recommendations:  - S/p EGD and Ileoscopy on 01/03 that showed a few ulcers in the distal ileum along with erythematous mucosa in the prepyloric region. Will follow up biopsy results. Discussed re-starting Entyvio.   - Rising liver enzymes, will order serologic workup with SAURAV, AMA, Antismooth muscle, RUQ US.   - Continue to hold off on antibiotics and steroids per our inpatient IBD algorithm pending workup.  - Follow up stool calprotectin  - Follow up infectious stool studies; C. Diff and E coli negative.  - Follow up full serologic profile.  - Please avoid NSAID's  - Please ensure the patient is on DVT ppx; Lovenox scheduled (MAR reviewed)  - If narcotics are needed, please use morphine; this is the preferred agent in IBD given its short acting nature

## 2024-01-03 NOTE — PROGRESS NOTES
[facility] [address] [place] [address] Medicine  Progress Note    Patient Name: Ivy Salgado  MRN: [id number]  Patient Class: IP- Inpatient   Admission Date: 12/31/2023  Length of Stay: 1 days  Attending Physician: Monique Mckeon MD  Primary Care Provider: Luis Madden DO        Subjective:     Principal Problem:Crohn's disease of small intestine with complication        HPI:  Ivy Salgado is a 43 y.o. female with a PMHx of ARPITA, Crohn's s/p ileostomy on Entyvio who presents to [facility] for evaluation of abdominal pain. Patient reprots intermittnet severe generalized abdominal pain for the past 4 days. Pain is worse on her right side and occasionally radiates to her flank. Endorses associated increased ostomy output, poor appetite, and nausea without any vomiting. She feels flushed and had increased temp at home but no fever (reported Tmax 100.2). Symptoms are consistent with prior Crohn's flares, has not had a flare since 2020.  Denies dark/bloody stools, HA, vision changes, chest pain, SOB, LE swelling or syncope.    ED: AFVSS. No leukocytosis or electrolyte abnormalities. ESR 94, CRP 22.8. UA noninfectious. CT abd/pelvis shows slow flow through a few mid to distal small bowel loops noting venous vascular engorgement about these loops and wall hyperemia concerning for inflammatory enteritis. ED provider spoke with GI who recommend holding antibiotics for now and NPO at midnight for potential scope in AM. Given IV dilaudid 1mg x2 and IV compazine.     Overview/Hospital Course:  Ms. Salgado was admitted to hospital medicine for inflammatory enteritis and suspected acute crohn's flare. C. Diff negative, stool culture without significant growth to date. GI consulted and performed EGD and ileoscopy. EGD with erythematous mucosa in the prepyloric region of the stomach. Ileoscopy revealed ulcers in the distal ileum. Biopsies pending. Abdominal U/S revealed mildly dilated bile ducts in the central right hepatic lobe.      Interval History: Pt seen and examined by me this afternoon following EGD and ileoscopy. EDGARDO. LINNHUMPHREY. Pt reports persistent R-sided abdominal pain, controlled on current regimen. Ileostomy output decreasing. Pt endorses decreased appetite and poor PO intake.     Review of Systems   Constitutional:  Positive for appetite change. Negative for chills, fatigue and fever.   HENT:  Negative for trouble swallowing and voice change.    Eyes:  Negative for photophobia and visual disturbance.   Respiratory:  Negative for cough, chest tightness, shortness of breath and wheezing.    Cardiovascular:  Negative for chest pain, palpitations and leg swelling.   Gastrointestinal:  Positive for abdominal pain, diarrhea and nausea. Negative for blood in stool, constipation and vomiting.   Genitourinary:  Negative for difficulty urinating, dysuria, frequency, hematuria and urgency.   Musculoskeletal:  Negative for arthralgias, back pain and gait problem.   Skin:  Negative for color change and rash.   Neurological:  Negative for dizziness, seizures, speech difficulty, weakness, light-headedness and headaches.   Psychiatric/Behavioral:  Negative for behavioral problems, confusion and decreased concentration.      Objective:     Vital Signs (Most Recent):  Temp: 99.1 °F (37.3 °C) (01/03/24 1159)  Pulse: 68 (01/03/24 1501)  Resp: 16 (01/03/24 1312)  BP: (!) 129/90 (01/03/24 1159)  SpO2: 97 % (01/03/24 1159) Vital Signs (24h Range):  Temp:  [98 °F (36.7 °C)-100 °F (37.8 °C)] 99.1 °F (37.3 °C)  Pulse:  [68-92] 68  Resp:  [10-20] 16  SpO2:  [95 %-98 %] 97 %  BP: ()/(53-90) 129/90     Weight: 50.9 kg (112 lb 3.2 oz)  Body mass index is 21.2 kg/m².    Intake/Output Summary (Last 24 hours) at 1/3/2024 1556  Last data filed at 1/3/2024 0830  Gross per 24 hour   Intake 560 ml   Output 1465 ml   Net -905 ml         Physical Exam  Vitals and nursing note reviewed.   Constitutional:       General: She is not in acute distress.      Appearance: She is well-developed.   HENT:      Head: Normocephalic and atraumatic.      Mouth/Throat:      Mouth: Mucous membranes are dry.   Eyes:      Extraocular Movements: Extraocular movements intact.      Conjunctiva/sclera: Conjunctivae normal.   Cardiovascular:      Rate and Rhythm: Normal rate and regular rhythm.      Heart sounds: Normal heart sounds.   Pulmonary:      Effort: Pulmonary effort is normal. No respiratory distress.      Breath sounds: Normal breath sounds. No wheezing.   Abdominal:      General: Bowel sounds are normal. There is no distension.      Palpations: Abdomen is soft.      Tenderness: There is abdominal tenderness (R-sided).      Comments: Ileostomy in place without surrounding erythema or drainage   Musculoskeletal:         General: No tenderness. Normal range of motion.      Cervical back: Normal range of motion and neck supple.   Skin:     General: Skin is warm and dry.      Capillary Refill: Capillary refill takes less than 2 seconds.      Findings: No rash.   Neurological:      Mental Status: She is alert and oriented to person, place, and time.      Cranial Nerves: No cranial nerve deficit.      Sensory: No sensory deficit.      Coordination: Coordination normal.   Psychiatric:         Behavior: Behavior normal.         Thought Content: Thought content normal.         Judgment: Judgment normal.             Significant Labs: All pertinent labs within the past 24 hours have been reviewed.    Significant Imaging: I have reviewed all pertinent imaging results/findings within the past 24 hours.    Assessment/Plan:      * Crohn's disease of small intestine with complication  - presentation consistent with Crohn's flare  - afebrile without leukocytosis  - inflammatory makers elevated and up-trending  - CT abd/pelvis shows inflammatory enteritis without abscess or obstruction   - EGD with erythematous mucosa in the prepyloric region of the stomach. Ileoscopy revealed ulcers in the  distal ileum. Biopsies pending.   - Abdominal U/S revealed mildly dilated bile ducts in the central right hepatic lobe.   - GI consulted, appreciate recs  - Bcx NGTD   - pain and nausea control    Transaminitis  - AST 52 -> 79, Alk Phos 170 -> 245, ALT wnl  - Abdominal U/S revealed mildly dilated bile ducts in the central right hepatic lobe.   - GI consulted, appreciate recs    Generalized anxiety disorder  - continue klonopin, remeron, and nadolol    S/P ileostomy  - routine ostomy care      VTE Risk Mitigation (From admission, onward)           Ordered     enoxaparin injection 40 mg  Daily         12/31/23 1912     IP VTE LOW RISK PATIENT  Once         12/31/23 1912     Place sequential compression device  Until discontinued         12/31/23 1912                    Discharge Planning   CHRISTOPH: 1/4/2024     Code Status: Full Code   Is the patient medically ready for discharge?: No    Reason for patient still in hospital (select all that apply): Patient trending condition, Laboratory test, Treatment, and Consult recommendations  Discharge Plan A: Home                  Candice Lee PA-C  Department of Hospital Medicine   [facility] - Observation [facility]

## 2024-01-03 NOTE — PLAN OF CARE
Problem: Infection  Goal: Absence of Infection Signs and Symptoms  Outcome: Ongoing, Progressing     Problem: Adult Inpatient Plan of Care  Goal: Plan of Care Review  Outcome: Ongoing, Progressing  Goal: Patient-Specific Goal (Individualized)  Outcome: Ongoing, Progressing  Goal: Absence of Hospital-Acquired Illness or Injury  Outcome: Ongoing, Progressing  Goal: Optimal Comfort and Wellbeing  Outcome: Ongoing, Progressing  Goal: Readiness for Transition of Care  Outcome: Ongoing, Progressing     Problem: Pain Acute  Goal: Acceptable Pain Control and Functional Ability  Outcome: Ongoing, Progressing     Problem: Activity and Energy Impairment (Anxiety Signs/Symptoms)  Goal: Optimized Energy Level (Anxiety Signs/Symptoms)  Outcome: Ongoing, Progressing     Problem: Cognitive Impairment (Anxiety Signs/Symptoms)  Goal: Optimized Cognitive Function (Anxiety Signs/Symptoms)  Outcome: Ongoing, Progressing     Problem: Mood Impairment (Anxiety Signs/Symptoms)  Goal: Improved Mood Symptoms (Anxiety Signs/Symptoms)  Outcome: Ongoing, Progressing     Problem: Sleep Impairment (Anxiety Signs/Symptoms)  Goal: Improved Sleep (Anxiety Signs/Symptoms)  Outcome: Ongoing, Progressing     Problem: Social, Occupational or Functional Impairment (Anxiety Signs/Symptoms)  Goal: Enhanced Social, Occupational or Functional Skills (Anxiety Signs/Symptoms)  Outcome: Ongoing, Progressing     Problem: Somatic Disturbance (Anxiety Signs/Symptoms)  Goal: Improved Somatic Symptoms (Anxiety Signs/Symptoms)  Outcome: Ongoing, Progressing     Problem: Adjustment to Illness (Bowel Disease, Inflammatory)  Goal: Optimal Adaptation to Chronic Illness  Outcome: Ongoing, Progressing     Problem: Diarrhea (Bowel Disease, Inflammatory)  Goal: Diarrhea Symptom Relief  Outcome: Ongoing, Progressing     Problem: Infection (Bowel Disease, Inflammatory)  Goal: Absence of Infection Signs and Symptoms  Outcome: Ongoing, Progressing     Problem: Nutrition  Impaired (Bowel Disease, Inflammatory)  Goal: Optimal Nutrition  Outcome: Ongoing, Progressing     Problem: Pain (Bowel Disease, Inflammatory)  Goal: Acceptable Pain Control  Outcome: Ongoing, Progressing     Problem: Skin Injury Risk Increased  Goal: Skin Health and Integrity  Outcome: Ongoing, Progressing

## 2024-01-03 NOTE — NURSING TRANSFER
Nursing Transfer Note  Pt transported to Endoscopy via stretcher accompanied by transporter.aaox4.resp even and non labored. Voided on call.Ostomy Emptied per pt. T100.0., o2 sat 98% on r/a.chart sent with pt. has cell phone,purse and ID.Report given to Endoscopy prior to transfer.

## 2024-01-03 NOTE — SUBJECTIVE & OBJECTIVE
Interval History: Pt seen and examined by me this afternoon following EGD and ileoscopy. EDGARDO. LINNHUMPHREY. Pt reports persistent R-sided abdominal pain, controlled on current regimen. Ileostomy output decreasing. Pt endorses decreased appetite and poor PO intake.     Review of Systems   Constitutional:  Positive for appetite change. Negative for chills, fatigue and fever.   HENT:  Negative for trouble swallowing and voice change.    Eyes:  Negative for photophobia and visual disturbance.   Respiratory:  Negative for cough, chest tightness, shortness of breath and wheezing.    Cardiovascular:  Negative for chest pain, palpitations and leg swelling.   Gastrointestinal:  Positive for abdominal pain, diarrhea and nausea. Negative for blood in stool, constipation and vomiting.   Genitourinary:  Negative for difficulty urinating, dysuria, frequency, hematuria and urgency.   Musculoskeletal:  Negative for arthralgias, back pain and gait problem.   Skin:  Negative for color change and rash.   Neurological:  Negative for dizziness, seizures, speech difficulty, weakness, light-headedness and headaches.   Psychiatric/Behavioral:  Negative for behavioral problems, confusion and decreased concentration.      Objective:     Vital Signs (Most Recent):  Temp: 99.1 °F (37.3 °C) (01/03/24 1159)  Pulse: 68 (01/03/24 1501)  Resp: 16 (01/03/24 1312)  BP: (!) 129/90 (01/03/24 1159)  SpO2: 97 % (01/03/24 1159) Vital Signs (24h Range):  Temp:  [98 °F (36.7 °C)-100 °F (37.8 °C)] 99.1 °F (37.3 °C)  Pulse:  [68-92] 68  Resp:  [10-20] 16  SpO2:  [95 %-98 %] 97 %  BP: ()/(53-90) 129/90     Weight: 50.9 kg (112 lb 3.2 oz)  Body mass index is 21.2 kg/m².    Intake/Output Summary (Last 24 hours) at 1/3/2024 1556  Last data filed at 1/3/2024 0830  Gross per 24 hour   Intake 560 ml   Output 1465 ml   Net -905 ml         Physical Exam  Vitals and nursing note reviewed.   Constitutional:       General: She is not in acute distress.     Appearance:  She is well-developed.   HENT:      Head: Normocephalic and atraumatic.      Mouth/Throat:      Mouth: Mucous membranes are dry.   Eyes:      Extraocular Movements: Extraocular movements intact.      Conjunctiva/sclera: Conjunctivae normal.   Cardiovascular:      Rate and Rhythm: Normal rate and regular rhythm.      Heart sounds: Normal heart sounds.   Pulmonary:      Effort: Pulmonary effort is normal. No respiratory distress.      Breath sounds: Normal breath sounds. No wheezing.   Abdominal:      General: Bowel sounds are normal. There is no distension.      Palpations: Abdomen is soft.      Tenderness: There is abdominal tenderness (R-sided).      Comments: Ileostomy in place without surrounding erythema or drainage   Musculoskeletal:         General: No tenderness. Normal range of motion.      Cervical back: Normal range of motion and neck supple.   Skin:     General: Skin is warm and dry.      Capillary Refill: Capillary refill takes less than 2 seconds.      Findings: No rash.   Neurological:      Mental Status: She is alert and oriented to person, place, and time.      Cranial Nerves: No cranial nerve deficit.      Sensory: No sensory deficit.      Coordination: Coordination normal.   Psychiatric:         Behavior: Behavior normal.         Thought Content: Thought content normal.         Judgment: Judgment normal.             Significant Labs: All pertinent labs within the past 24 hours have been reviewed.    Significant Imaging: I have reviewed all pertinent imaging results/findings within the past 24 hours.

## 2024-01-03 NOTE — H&P
Short Stay Endoscopy History and Physical    PCP - Luis Madden, DO    Procedure - EGD/Ileoscopy  ASA - per anesthesia  Mallampati - per anesthesia  History of Anesthesia problems - no  Family history Anesthesia problems -  no   Plan of anesthesia - MAC    HPI:  This is a 43 y.o. female here for evaluation of :     EGD - nausea  Ileoscopy-diarrhea    ROS:  Constitutional: No fevers, chills, No weight loss  CV: No chest pain  Pulm: No cough, No shortness of breath  Ophtho: No vision changes  GI: see HPI  Derm: No rash    Medical History:  has a past medical history of Abnormal Pap smear (2007), Abnormal Pap smear (5/26/2011), Anemia, Anxiety, Arthritis, C. difficile diarrhea, Crohn's disease, Depression (8/5/2017), Encounter for blood transfusion, Genital HSV, History of colposcopy with cervical biopsy (2007 and 7/2011), Hypertension, Kidney stone, Kidney stone, Melanoma, Recurrent UTI (4/3/2013), S/P ileostomy (7/9/2012), and Sterilization (6/23/2012).    Surgical History:  has a past surgical history that includes Total colectomy; Ileostomy; Colon surgery; Upper gastrointestinal endoscopy; Colonoscopy; CKC; Appendectomy; Bladder surgery; Skin biopsy; Abdominal surgery; Oophorectomy (Right, 04/16/2015); Portacath placement (02/21/2017); Total abdominal hysterectomy (04/16/2015); Small intestine surgery; Tubal ligation (06/06/2012); Bilateral salpingo-oophorectomy (BSO) (Bilateral, 05/30/2019); Lysis of adhesions (N/A, 05/30/2019); Excision of melanoma (07/17/2019); Diagnostic laparoscopy (N/A, 07/09/2020); Laparoscopic lysis of adhesions (N/A, 07/09/2020); Cystoscopy w/ ureteral stent placement (Left, 09/15/2020); Ureteroscopy (Left, 09/23/2020); Cystoscopy (09/23/2020); Laser lithotripsy (09/23/2020); Ureteroscopic removal of ureteric calculus (09/23/2020); breast lift; Breast surgery; Augmentation of breast (Bilateral, 06/2022); and Arthroplasty of shoulder (Left, 7/18/2023).    Family History: family history  includes Breast cancer (age of onset: 41) in her maternal cousin; Cancer in her father and maternal grandfather; Colon cancer in her father; Crohn's disease in her brother and daughter; Diabetes in her maternal grandfather and paternal grandfather; Endometrial cancer in her maternal aunt; Hearing loss in her paternal grandmother; Heart disease in her maternal grandfather; Hyperlipidemia in her father; Hypertension in her mother; Liver cancer in her father; Skin cancer in her maternal grandfather.. Otherwise no colon cancer, inflammatory bowel disease, or GI malignancies.    Social History:  reports that she has never smoked. She has never used smokeless tobacco. She reports that she does not currently use alcohol. She reports that she does not use drugs.    Review of patient's allergies indicates:   Allergen Reactions    Azathioprine sodium Other (See Comments)     Other reaction(s): pancreatitis  Other reaction(s): pancreatitis    Methotrexate analogues Other (See Comments)     leukopenia    Stelara [ustekinumab] Other (See Comments)     Multiple infections    Zofran [ondansetron hcl (pf)] Other (See Comments)     Per patient causes prolong QT    Vancomycin analogues Other (See Comments)     Made her red    Azathioprine      Other reaction(s): Unknown    Methotrexate      Other reaction(s): infection-    Morphine Itching and Other (See Comments)     Other reaction(s): Itching    Zofran [ondansetron hcl]      Other reaction(s): Hives    Bactrim [sulfamethoxazole-trimethoprim] Palpitations    Ciprofloxacin Palpitations       Medications:   Facility-Administered Medications Prior to Admission   Medication Dose Route Frequency Provider Last Rate Last Admin    estradiol valerate (DELESTROGEN) injection 20 mg/mL  20 mg Intramuscular Q30 Days Gilson El MD   20 mg at 12/30/22 0902    estradiol valerate injection 20 mg  20 mg Intramuscular Q30 Days Gilson El MD   20 mg at 05/11/23 1327     Medications  Prior to Admission   Medication Sig Dispense Refill Last Dose    clonazePAM (KLONOPIN) 1 MG tablet Take 1 tablet (1 mg total) by mouth 2 (two) times daily. 60 tablet 2     cyclobenzaprine (FLEXERIL) 10 MG tablet Take 1 tablet (10 mg total) by mouth every evening as needed for muscle pain 15 tablet 0     droNABinol (MARINOL) 5 MG capsule Take 1 capsule (5 mg total) by mouth 2 (two) times daily before meals. 60 capsule 1     estradioL (ESTRACE) 0.01 % (0.1 mg/gram) vaginal cream Place 1 g vaginally once daily. 30 g 12     flu vacc [id number] 6mos up,PF, (FLUARIX QUAD [id number], PF,) 60 mcg (15 mcg x 4)/0.5 mL Syrg inject into muscle for one dose 0.5 mL 0     fluconazole (DIFLUCAN) 150 MG Tab Take 1 tablet (150 mg total) by mouth every 72 hours as needed (vaginal yeast). 3 tablet 0     gabapentin (NEURONTIN) 300 MG capsule Take 1 capsule (300 mg total) by mouth 2 (two) times daily. 60 capsule 11     HYDROcodone-acetaminophen (NORCO) 7.5-325 mg per tablet Take 1 tablet by mouth every 24 hours as needed for Pain. 7 tablet 0     loperamide (IMODIUM) 2 mg capsule Take 2 mg by mouth daily as needed for Diarrhea.       mirtazapine (REMERON SOL-TAB) 30 MG disintegrating tablet Take 1 tablet (30 mg total) by mouth nightly. 30 tablet 0     multivitamin (THERAGRAN) per tablet Take 1 tablet by mouth once daily.       nadoloL (CORGARD) 40 MG tablet Take 1 tablet (40 mg total) by mouth once daily. Patient needs appointment before next refill. 30 tablet 0     pantoprazole (PROTONIX) 40 MG tablet Take 1 tablet (40 mg total) by mouth 2 (two) times daily. 60 tablet 12     promethazine (PHENERGAN) 25 MG tablet TAKE 1 TABLET BY MOUTH EVERY 8 HOURS FOR NAUSEA 30 tablet 1     tamsulosin (FLOMAX) 0.4 mg Cap Take 1 capsule (0.4 mg total) by mouth once daily. 30 capsule 1     triamcinolone acetonide 0.1% (KENALOG) 0.1 % cream Apply topically 2 (two) times daily.       valACYclovir (VALTREX) 500 MG tablet Take 1 tablet (500 mg total) by mouth  once daily. 90 tablet 3     vedolizumab (ENTYVIO) 300 mg SolR injection Inject 300 mg into the vein.       zolpidem (AMBIEN) 10 mg Tab Take 1 tablet (10 mg total) by mouth every evening. 30 tablet 2        Physical Exam:    Vital Signs:   Vitals:    01/03/24 0717   BP: 122/81   Pulse: 85   Resp: 16   Temp: 98.2 °F (36.8 °C)       Gen: NAD, lying comfortably  HENT: NCAT, oropharynx clear  Eyes: anicteric sclerae, EOMI grossly  Neck: supple, no visible masses/goiter  Cardiac: RRR  Lungs: non-labored breathing  Abd: soft, NT/ND, normoactive BS  Ext: no LE edema, warm, well perfused  Skin: skin intact on exposed body parts, no visible rashes, lesions  Neuro: A&Ox4, neuro exam grossly intact, moves all extremities  Psych: appropriate mood, affect      Labs:  Lab Results   Component Value Date    WBC 4.77 01/03/2024    HGB 10.6 (L) 01/03/2024    HCT 32.2 (L) 01/03/2024     01/03/2024    CHOL 192 01/02/2024    TRIG 150 01/02/2024    HDL 40 01/02/2024    ALT 44 01/03/2024    AST 79 (H) 01/03/2024     01/03/2024    K 3.8 01/03/2024     01/03/2024    CREATININE 0.7 01/03/2024    BUN 4 (L) 01/03/2024    CO2 26 01/03/2024    TSH 2.386 01/02/2024    INR 1.1 02/02/2018    HGBA1C 5.1 01/01/2024       Plan:  EGD for nausea  Ileoscopy for diarrhea    I have explained the risks and benefits of endoscopy procedures to the patient including but not limited to bleeding, perforation, infection, and death.  The patient was asked if they understand and allowed to ask any further questions to their satisfaction.    Lily Lind MD

## 2024-01-03 NOTE — SUBJECTIVE & OBJECTIVE
Subjective:     Interval History:   S/p EGD and Ileoscopy on 01/03 that showed a few ulcers in the distal ileum along with erythematous mucosa in the prepyloric region. Liver enzymes rising, 52 to 79 and  to 245. No abdominal pain, new medications, herbal supplements.     Review of Systems   Constitutional:  Negative for chills and fever.   Gastrointestinal:  Negative for abdominal pain, nausea and vomiting.     Objective:     Vital Signs (Most Recent):  Temp: 99.1 °F (37.3 °C) (01/03/24 1159)  Pulse: 68 (01/03/24 1501)  Resp: 16 (01/03/24 1312)  BP: (!) 129/90 (01/03/24 1159)  SpO2: 97 % (01/03/24 1159) Vital Signs (24h Range):  Temp:  [98 °F (36.7 °C)-100 °F (37.8 °C)] 99.1 °F (37.3 °C)  Pulse:  [68-92] 68  Resp:  [10-20] 16  SpO2:  [95 %-98 %] 97 %  BP: ()/(53-90) 129/90     Weight: 50.9 kg (112 lb 3.2 oz) (01/01/24 0330)  Body mass index is 21.2 kg/m².      Intake/Output Summary (Last 24 hours) at 1/3/2024 1622  Last data filed at 1/3/2024 0830  Gross per 24 hour   Intake 560 ml   Output 1465 ml   Net -905 ml       Lines/Drains/Airways       Central Venous Catheter Line  Duration                  PowerPort A Cath Single Lumen 12/31/23 1652 Atrial Left 2 days              Drain  Duration                  Ileostomy 06/16/12 0000 RLQ 4218 days                     Physical Exam  Vitals reviewed.   Constitutional:       General: She is not in acute distress.     Appearance: Normal appearance. She is not ill-appearing.   Eyes:      Extraocular Movements: Extraocular movements intact.   Cardiovascular:      Rate and Rhythm: Normal rate.   Pulmonary:      Effort: Pulmonary effort is normal.   Abdominal:      General: There is no distension.      Palpations: Abdomen is soft.      Tenderness: There is no abdominal tenderness. There is no guarding.   Neurological:      Mental Status: She is alert. Mental status is at baseline.          Significant Labs:  All pertinent lab results from the last 24 hours  have been reviewed.

## 2024-01-03 NOTE — NURSING TRANSFER
Nursing Transfer Note      1/3/2024   9:28 AM    Nurse giving handoff:Edvin  Nurse receiving handoff:Marino    Reason patient is being transferred: post op    Transfer To: 1106    Transfer via stretcher    Transfer with none    Transported by Pt transport     Transfer Vital Signs: See Flowsheet     Telemetry: box on patient   Order for Tele Monitor? yes    Additional Lines: illeostomy intact    4eyes on Skin: yes    Medicines sent: none    Any special needs or follow-up needed: none    Patient belongings transferred with patient: Yes    Chart send with patient: Yes    Notified: pt mother     Patient reassessed at: 1/3/2024 0930  1  Upon arrival to floor: patient oriented to room, call bell in reach, and bed in lowest position

## 2024-01-03 NOTE — ANESTHESIA POSTPROCEDURE EVALUATION
Anesthesia Post Evaluation    Patient: Ivy Salgado    Procedure(s) Performed: Procedure(s) (LRB):  EGD (ESOPHAGOGASTRODUODENOSCOPY) (N/A)  ILEOSCOPY (N/A)    Final Anesthesia Type: general      Patient location during evaluation: PACU  Patient participation: Yes- Able to Participate  Level of consciousness: awake and alert  Post-procedure vital signs: reviewed and stable  Pain management: adequate  Airway patency: patent    PONV status at discharge: No PONV  Anesthetic complications: no      Cardiovascular status: blood pressure returned to baseline and hemodynamically stable  Respiratory status: spontaneous ventilation  Hydration status: euvolemic  Follow-up not needed.              Vitals Value Taken Time   /72 01/03/24 0932   Temp 36.7 °C (98.1 °F) 01/03/24 0838   Pulse 76 01/03/24 1058   Resp 10 01/03/24 0936   SpO2 91 % 01/03/24 0936   Vitals shown include unvalidated device data.      Event Time   Out of Recovery 09:36:13         Pain/Michael Score: Pain Rating Prior to Med Admin: 7 (1/3/2024  5:28 AM)  Pain Rating Post Med Admin: 0 (1/3/2024  6:09 AM)  Michael Score: 10 (1/3/2024  9:30 AM)

## 2024-01-03 NOTE — PLAN OF CARE
Problem: Infection  Goal: Absence of Infection Signs and Symptoms  Outcome: Ongoing, Progressing     Problem: Adult Inpatient Plan of Care  Goal: Plan of Care Review  Outcome: Ongoing, Progressing  Goal: Patient-Specific Goal (Individualized)  Outcome: Ongoing, Progressing  Goal: Absence of Hospital-Acquired Illness or Injury  Outcome: Ongoing, Progressing  Goal: Optimal Comfort and Wellbeing  Outcome: Ongoing, Progressing  Goal: Readiness for Transition of Care  Outcome: Ongoing, Progressing     Problem: Pain Acute  Goal: Acceptable Pain Control and Functional Ability  Outcome: Ongoing, Progressing     Problem: Activity and Energy Impairment (Anxiety Signs/Symptoms)  Goal: Optimized Energy Level (Anxiety Signs/Symptoms)  Outcome: Ongoing, Progressing     Problem: Cognitive Impairment (Anxiety Signs/Symptoms)  Goal: Optimized Cognitive Function (Anxiety Signs/Symptoms)  Outcome: Ongoing, Progressing     Problem: Mood Impairment (Anxiety Signs/Symptoms)  Goal: Improved Mood Symptoms (Anxiety Signs/Symptoms)  Outcome: Ongoing, Progressing     Problem: Sleep Impairment (Anxiety Signs/Symptoms)  Goal: Improved Sleep (Anxiety Signs/Symptoms)  Outcome: Ongoing, Progressing     Problem: Social, Occupational or Functional Impairment (Anxiety Signs/Symptoms)  Goal: Enhanced Social, Occupational or Functional Skills (Anxiety Signs/Symptoms)  Outcome: Ongoing, Progressing     Problem: Somatic Disturbance (Anxiety Signs/Symptoms)  Goal: Improved Somatic Symptoms (Anxiety Signs/Symptoms)  Outcome: Ongoing, Progressing

## 2024-01-03 NOTE — PLAN OF CARE
Problem: Infection  Goal: Absence of Infection Signs and Symptoms  Outcome: Ongoing, Progressing     Problem: Adult Inpatient Plan of Care  Goal: Plan of Care Review  Outcome: Ongoing, Progressing  Goal: Patient-Specific Goal (Individualized)  Outcome: Ongoing, Progressing  Goal: Absence of Hospital-Acquired Illness or Injury  Outcome: Ongoing, Progressing  Goal: Optimal Comfort and Wellbeing  Outcome: Ongoing, Progressing  Goal: Readiness for Transition of Care  Outcome: Ongoing, Progressing   Pt progressing toward goals. No distress noted. No falls or injuries during shift. Pt bed in lowest position. Side rails x2. Call bell and personal belongs within reach. Telemetry maintained. Safety precautions maintained.

## 2024-01-03 NOTE — TRANSFER OF CARE
Anesthesia Transfer of Care Note    Patient: Ivy Salgado    Procedure(s) Performed: Procedure(s) (LRB):  EGD (ESOPHAGOGASTRODUODENOSCOPY) (N/A)  ILEOSCOPY (N/A)    Patient location: PACU    Anesthesia Type: general    Transport from OR: Transported from OR on 2-3 L/min O2 by NC with adequate spontaneous ventilation    Post pain: adequate analgesia    Post assessment: no apparent anesthetic complications    Post vital signs: stable    Level of consciousness: sedated    Nausea/Vomiting: no nausea/vomiting    Complications: none    Transfer of care protocol was followed      Last vitals:

## 2024-01-03 NOTE — PROVATION PATIENT INSTRUCTIONS
Discharge Summary/Instructions after an Endoscopic Procedure  Patient Name: Ivy Salgado  Patient MRN: [id number]  Patient YOB: 1980  Wednesday, January 3, 2024  Lily Lind MD  Dear patient,  As a result of recent federal legislation (The Federal Cures Act), you may   receive lab or pathology results from your procedure in your [facility]   account before your physician is able to contact you. Your physician or   their representative will relay the results to you with their   recommendations at their soonest availability.  Thank you,  RESTRICTIONS:  During your procedure today, you received medications for sedation.  These   medications may affect your judgment, balance and coordination.  Therefore,   for 24 hours, you have the following restrictions:   - DO NOT drive a car, operate machinery, make legal/financial decisions,   sign important papers or drink alcohol.    ACTIVITY:  Today: no heavy lifting, straining or running due to procedural   sedation/anesthesia.  The following day: return to full activity including work.  DIET:  Eat and drink normally unless instructed otherwise.     TREATMENT FOR COMMON SIDE EFFECTS:  - Mild abdominal pain, nausea, belching, bloating or excessive gas:  rest,   eat lightly and use a heating pad.  - Sore Throat: treat with throat lozenges and/or gargle with warm salt   water.  - Because air was used during the procedure, expelling large amounts of air   from your rectum or belching is normal.  - If a bowel prep was taken, you may not have a bowel movement for 1-3 days.    This is normal.  SYMPTOMS TO WATCH FOR AND REPORT TO YOUR PHYSICIAN:  1. Abdominal pain or bloating, other than gas cramps.  2. Chest pain.  3. Back pain.  4. Signs of infection such as: chills or fever occurring within 24 hours   after the procedure.  5. Rectal bleeding, which would show as bright red, maroon, or black stools.   (A tablespoon of blood from the rectum is not serious, especially  if   hemorrhoids are present.)  6. Vomiting.  7. Weakness or dizziness.  GO DIRECTLY TO THE NEAREST EMERGENCY ROOM IF YOU HAVE ANY OF THE FOLLOWING:      Difficulty breathing              Chills and/or fever over 101 F   Persistent vomiting and/or vomiting blood   Severe abdominal pain   Severe chest pain   Black, tarry stools   Bleeding- more than one tablespoon   Any other symptom or condition that you feel may need urgent attention  Your doctor recommends these additional instructions:  If any biopsies were taken, your doctors clinic will contact you in 1 to 2   weeks with any results.  - Return patient to hospital [facility] for ongoing care.   - NPO.   - Await pathology results.   - Perform ileoscopy today.   For questions, problems or results please call your physician - Lily Lind MD at Work:  [phone].  [facility] [place], EMERGENCY ROOM PHONE NUMBER: [phone]  IF A COMPLICATION OR EMERGENCY SITUATION ARISES AND YOU ARE UNABLE TO REACH   YOUR PHYSICIAN - GO DIRECTLY TO THE EMERGENCY ROOM.  Lily Lind MD  1/3/2024 8:36:46 AM  This report has been verified and signed electronically.  Dear patient,  As a result of recent federal legislation (The Federal Cures Act), you may   receive lab or pathology results from your procedure in your [facility]   account before your physician is able to contact you. Your physician or   their representative will relay the results to you with their   recommendations at their soonest availability.  Thank you,  PROVATION

## 2024-01-04 PROBLEM — R79.89 ELEVATED LIVER FUNCTION TESTS: Status: ACTIVE | Noted: 2024-01-04

## 2024-01-04 LAB
A1AT SERPL-MCNC: 192 MG/DL (ref 100–190)
ALBUMIN SERPL BCP-MCNC: 2.9 G/DL (ref 3.5–5.2)
ALP SERPL-CCNC: 309 U/L (ref 55–135)
ALT SERPL W/O P-5'-P-CCNC: 130 U/L (ref 10–44)
ANA SER QL IF: NORMAL
ANION GAP SERPL CALC-SCNC: 9 MMOL/L (ref 8–16)
AST SERPL-CCNC: 179 U/L (ref 10–40)
BASOPHILS # BLD AUTO: 0.03 K/UL (ref 0–0.2)
BASOPHILS NFR BLD: 0.5 % (ref 0–1.9)
BILIRUB SERPL-MCNC: 0.3 MG/DL (ref 0.1–1)
BUN SERPL-MCNC: 6 MG/DL (ref 6–20)
CALCIUM SERPL-MCNC: 9.2 MG/DL (ref 8.7–10.5)
CERULOPLASMIN SERPL-MCNC: 40 MG/DL (ref 15–45)
CHLORIDE SERPL-SCNC: 106 MMOL/L (ref 95–110)
CO2 SERPL-SCNC: 27 MMOL/L (ref 23–29)
CREAT SERPL-MCNC: 0.7 MG/DL (ref 0.5–1.4)
DIFFERENTIAL METHOD BLD: ABNORMAL
EOSINOPHIL # BLD AUTO: 0.2 K/UL (ref 0–0.5)
EOSINOPHIL NFR BLD: 3.4 % (ref 0–8)
ERYTHROCYTE [DISTWIDTH] IN BLOOD BY AUTOMATED COUNT: 13.5 % (ref 11.5–14.5)
EST. GFR  (NO RACE VARIABLE): >60 ML/MIN/1.73 M^2
GLUCOSE SERPL-MCNC: 105 MG/DL (ref 70–110)
HCT VFR BLD AUTO: 33.5 % (ref 37–48.5)
HGB BLD-MCNC: 10.9 G/DL (ref 12–16)
IMM GRANULOCYTES # BLD AUTO: 0.03 K/UL (ref 0–0.04)
IMM GRANULOCYTES NFR BLD AUTO: 0.5 % (ref 0–0.5)
LYMPHOCYTES # BLD AUTO: 2 K/UL (ref 1–4.8)
LYMPHOCYTES NFR BLD: 33.7 % (ref 18–48)
MAGNESIUM SERPL-MCNC: 1.8 MG/DL (ref 1.6–2.6)
MCH RBC QN AUTO: 29.1 PG (ref 27–31)
MCHC RBC AUTO-ENTMCNC: 32.5 G/DL (ref 32–36)
MCV RBC AUTO: 90 FL (ref 82–98)
MONOCYTES # BLD AUTO: 0.9 K/UL (ref 0.3–1)
MONOCYTES NFR BLD: 15.7 % (ref 4–15)
NEUTROPHILS # BLD AUTO: 2.7 K/UL (ref 1.8–7.7)
NEUTROPHILS NFR BLD: 46.2 % (ref 38–73)
NRBC BLD-RTO: 0 /100 WBC
PHOSPHATE SERPL-MCNC: 4.5 MG/DL (ref 2.7–4.5)
PLATELET # BLD AUTO: 315 K/UL (ref 150–450)
PMV BLD AUTO: 9.6 FL (ref 9.2–12.9)
POTASSIUM SERPL-SCNC: 4 MMOL/L (ref 3.5–5.1)
PROCALCITONIN SERPL IA-MCNC: 0.02 NG/ML
PROT SERPL-MCNC: 7 G/DL (ref 6–8.4)
RBC # BLD AUTO: 3.74 M/UL (ref 4–5.4)
SODIUM SERPL-SCNC: 142 MMOL/L (ref 136–145)
TTG IGA SER-ACNC: 1 U/ML
WBC # BLD AUTO: 5.91 K/UL (ref 3.9–12.7)

## 2024-01-04 PROCEDURE — 82103 ALPHA-1-ANTITRYPSIN TOTAL: CPT | Performed by: INTERNAL MEDICINE

## 2024-01-04 PROCEDURE — A9585 GADOBUTROL INJECTION: HCPCS | Performed by: STUDENT IN AN ORGANIZED HEALTH CARE EDUCATION/TRAINING PROGRAM

## 2024-01-04 PROCEDURE — 21400001 HC TELEMETRY ROOM

## 2024-01-04 PROCEDURE — 85025 COMPLETE CBC W/AUTO DIFF WBC: CPT | Performed by: HOSPITALIST

## 2024-01-04 PROCEDURE — 99233 SBSQ HOSP IP/OBS HIGH 50: CPT | Mod: ,,,

## 2024-01-04 PROCEDURE — 63600175 PHARM REV CODE 636 W HCPCS: Performed by: PHYSICIAN ASSISTANT

## 2024-01-04 PROCEDURE — 86038 ANTINUCLEAR ANTIBODIES: CPT | Performed by: STUDENT IN AN ORGANIZED HEALTH CARE EDUCATION/TRAINING PROGRAM

## 2024-01-04 PROCEDURE — 84145 PROCALCITONIN (PCT): CPT

## 2024-01-04 PROCEDURE — 36415 COLL VENOUS BLD VENIPUNCTURE: CPT | Mod: XB

## 2024-01-04 PROCEDURE — 63600175 PHARM REV CODE 636 W HCPCS

## 2024-01-04 PROCEDURE — 83735 ASSAY OF MAGNESIUM: CPT | Performed by: HOSPITALIST

## 2024-01-04 PROCEDURE — 82390 ASSAY OF CERULOPLASMIN: CPT | Performed by: INTERNAL MEDICINE

## 2024-01-04 PROCEDURE — 36415 COLL VENOUS BLD VENIPUNCTURE: CPT | Performed by: HOSPITALIST

## 2024-01-04 PROCEDURE — 86015 ACTIN ANTIBODY EACH: CPT | Performed by: STUDENT IN AN ORGANIZED HEALTH CARE EDUCATION/TRAINING PROGRAM

## 2024-01-04 PROCEDURE — 25000003 PHARM REV CODE 250

## 2024-01-04 PROCEDURE — 84100 ASSAY OF PHOSPHORUS: CPT | Performed by: HOSPITALIST

## 2024-01-04 PROCEDURE — 25000003 PHARM REV CODE 250: Performed by: FAMILY MEDICINE

## 2024-01-04 PROCEDURE — 86381 MITOCHONDRIAL ANTIBODY EACH: CPT | Performed by: STUDENT IN AN ORGANIZED HEALTH CARE EDUCATION/TRAINING PROGRAM

## 2024-01-04 PROCEDURE — 25500020 PHARM REV CODE 255: Performed by: STUDENT IN AN ORGANIZED HEALTH CARE EDUCATION/TRAINING PROGRAM

## 2024-01-04 PROCEDURE — 80053 COMPREHEN METABOLIC PANEL: CPT | Performed by: HOSPITALIST

## 2024-01-04 PROCEDURE — 25000003 PHARM REV CODE 250: Performed by: PHYSICIAN ASSISTANT

## 2024-01-04 RX ORDER — SODIUM CHLORIDE, SODIUM LACTATE, POTASSIUM CHLORIDE, CALCIUM CHLORIDE 600; 310; 30; 20 MG/100ML; MG/100ML; MG/100ML; MG/100ML
INJECTION, SOLUTION INTRAVENOUS CONTINUOUS
Status: DISCONTINUED | OUTPATIENT
Start: 2024-01-04 | End: 2024-01-04

## 2024-01-04 RX ORDER — GADOBUTROL 604.72 MG/ML
6 INJECTION INTRAVENOUS
Status: COMPLETED | OUTPATIENT
Start: 2024-01-04 | End: 2024-01-04

## 2024-01-04 RX ADMIN — HYDROMORPHONE HYDROCHLORIDE 0.5 MG: 1 INJECTION, SOLUTION INTRAMUSCULAR; INTRAVENOUS; SUBCUTANEOUS at 06:01

## 2024-01-04 RX ADMIN — SODIUM CHLORIDE, SODIUM LACTATE, POTASSIUM CHLORIDE, AND CALCIUM CHLORIDE: 600; 310; 30; 20 INJECTION, SOLUTION INTRAVENOUS at 10:01

## 2024-01-04 RX ADMIN — THERA TABS 1 TABLET: TAB at 09:01

## 2024-01-04 RX ADMIN — HYDROMORPHONE HYDROCHLORIDE 0.5 MG: 1 INJECTION, SOLUTION INTRAMUSCULAR; INTRAVENOUS; SUBCUTANEOUS at 10:01

## 2024-01-04 RX ADMIN — MORPHINE SULFATE 30 MG: 15 TABLET ORAL at 01:01

## 2024-01-04 RX ADMIN — PROCHLORPERAZINE EDISYLATE 5 MG: 5 INJECTION INTRAMUSCULAR; INTRAVENOUS at 10:01

## 2024-01-04 RX ADMIN — DRONABINOL 5 MG: 2.5 CAPSULE ORAL at 05:01

## 2024-01-04 RX ADMIN — CLONAZEPAM 1 MG: 0.5 TABLET ORAL at 10:01

## 2024-01-04 RX ADMIN — PANTOPRAZOLE SODIUM 40 MG: 40 TABLET, DELAYED RELEASE ORAL at 09:01

## 2024-01-04 RX ADMIN — HYDROMORPHONE HYDROCHLORIDE 0.5 MG: 1 INJECTION, SOLUTION INTRAMUSCULAR; INTRAVENOUS; SUBCUTANEOUS at 03:01

## 2024-01-04 RX ADMIN — MIRTAZAPINE 30 MG: 15 TABLET, ORALLY DISINTEGRATING ORAL at 10:01

## 2024-01-04 RX ADMIN — FERROUS SULFATE TAB EC 325 MG (65 MG FE EQUIVALENT) 1 EACH: 325 (65 FE) TABLET DELAYED RESPONSE at 09:01

## 2024-01-04 RX ADMIN — GABAPENTIN 300 MG: 300 CAPSULE ORAL at 10:01

## 2024-01-04 RX ADMIN — CLONAZEPAM 1 MG: 0.5 TABLET ORAL at 09:01

## 2024-01-04 RX ADMIN — PANTOPRAZOLE SODIUM 40 MG: 40 TABLET, DELAYED RELEASE ORAL at 10:01

## 2024-01-04 RX ADMIN — SODIUM CHLORIDE, POTASSIUM CHLORIDE, SODIUM LACTATE AND CALCIUM CHLORIDE 500 ML: 600; 310; 30; 20 INJECTION, SOLUTION INTRAVENOUS at 09:01

## 2024-01-04 RX ADMIN — MORPHINE SULFATE 30 MG: 15 TABLET ORAL at 10:01

## 2024-01-04 RX ADMIN — GADOBUTROL 6 ML: 604.72 INJECTION INTRAVENOUS at 03:01

## 2024-01-04 RX ADMIN — ENOXAPARIN SODIUM 40 MG: 40 INJECTION SUBCUTANEOUS at 06:01

## 2024-01-04 RX ADMIN — PROCHLORPERAZINE EDISYLATE 5 MG: 5 INJECTION INTRAMUSCULAR; INTRAVENOUS at 03:01

## 2024-01-04 RX ADMIN — NADOLOL 40 MG: 40 TABLET ORAL at 09:01

## 2024-01-04 RX ADMIN — GABAPENTIN 300 MG: 300 CAPSULE ORAL at 09:01

## 2024-01-04 RX ADMIN — MORPHINE SULFATE 30 MG: 15 TABLET ORAL at 05:01

## 2024-01-04 NOTE — ASSESSMENT & PLAN NOTE
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

## 2024-01-04 NOTE — PLAN OF CARE
Problem: Infection  Goal: Absence of Infection Signs and Symptoms  Outcome: Ongoing, Progressing     Problem: Adult Inpatient Plan of Care  Goal: Plan of Care Review  Outcome: Ongoing, Progressing  Goal: Patient-Specific Goal (Individualized)  Outcome: Ongoing, Progressing  Goal: Absence of Hospital-Acquired Illness or Injury  Outcome: Ongoing, Progressing  Goal: Optimal Comfort and Wellbeing  Outcome: Ongoing, Progressing  Goal: Readiness for Transition of Care  Outcome: Ongoing, Progressing     Problem: Pain Acute  Goal: Acceptable Pain Control and Functional Ability  Outcome: Ongoing, Progressing     Problem: Activity and Energy Impairment (Anxiety Signs/Symptoms)  Goal: Optimized Energy Level (Anxiety Signs/Symptoms)  Outcome: Ongoing, Progressing     Problem: Cognitive Impairment (Anxiety Signs/Symptoms)  Goal: Optimized Cognitive Function (Anxiety Signs/Symptoms)  Outcome: Ongoing, Progressing     Problem: Mood Impairment (Anxiety Signs/Symptoms)  Goal: Improved Mood Symptoms (Anxiety Signs/Symptoms)  Outcome: Ongoing, Progressing     Problem: Sleep Impairment (Anxiety Signs/Symptoms)  Goal: Improved Sleep (Anxiety Signs/Symptoms)  Outcome: Ongoing, Progressing     Problem: Social, Occupational or Functional Impairment (Anxiety Signs/Symptoms)  Goal: Enhanced Social, Occupational or Functional Skills (Anxiety Signs/Symptoms)  Outcome: Ongoing, Progressing     Problem: Somatic Disturbance (Anxiety Signs/Symptoms)  Goal: Improved Somatic Symptoms (Anxiety Signs/Symptoms)  Outcome: Ongoing, Progressing     Problem: Adjustment to Illness (Bowel Disease, Inflammatory)  Goal: Optimal Adaptation to Chronic Illness  Outcome: Ongoing, Progressing     Problem: Diarrhea (Bowel Disease, Inflammatory)  Goal: Diarrhea Symptom Relief  Outcome: Ongoing, Progressing     Problem: Infection (Bowel Disease, Inflammatory)  Goal: Absence of Infection Signs and Symptoms  Outcome: Ongoing, Progressing     Problem: Nutrition  Impaired (Bowel Disease, Inflammatory)  Goal: Optimal Nutrition  Outcome: Ongoing, Progressing     Problem: Pain (Bowel Disease, Inflammatory)  Goal: Acceptable Pain Control  Outcome: Ongoing, Progressing     Problem: Skin Injury Risk Increased  Goal: Skin Health and Integrity  Outcome: Ongoing, Progressing     Problem: Fall Injury Risk  Goal: Absence of Fall and Fall-Related Injury  Outcome: Ongoing, Progressing

## 2024-01-04 NOTE — PROGRESS NOTES
[facility] [address] [place] [address] Medicine  Progress Note    Patient Name: Ivy Salgado  MRN: [id number]  Patient Class: IP- Inpatient   Admission Date: 12/31/2023  Length of Stay: 2 days  Attending Physician: Rosita Mendez MD  Primary Care Provider: Luis Madden DO        Subjective:     Principal Problem:Crohn's disease of small intestine with complication        HPI:  Ivy Salgado is a 43 y.o. female with a PMHx of ARPITA, Crohn's s/p ileostomy on Entyvio who presents to [facility] for evaluation of abdominal pain. Patient reprots intermittnet severe generalized abdominal pain for the past 4 days. Pain is worse on her right side and occasionally radiates to her flank. Endorses associated increased ostomy output, poor appetite, and nausea without any vomiting. She feels flushed and had increased temp at home but no fever (reported Tmax 100.2). Symptoms are consistent with prior Crohn's flares, has not had a flare since 2020.  Denies dark/bloody stools, HA, vision changes, chest pain, SOB, LE swelling or syncope.    ED: AFVSS. No leukocytosis or electrolyte abnormalities. ESR 94, CRP 22.8. UA noninfectious. CT abd/pelvis shows slow flow through a few mid to distal small bowel loops noting venous vascular engorgement about these loops and wall hyperemia concerning for inflammatory enteritis. ED provider spoke with GI who recommend holding antibiotics for now and NPO at midnight for potential scope in AM. Given IV dilaudid 1mg x2 and IV compazine.     Overview/Hospital Course:  Ms. Salgado was admitted to hospital medicine for inflammatory enteritis and suspected acute crohn's flare. C. Diff negative, stool culture without significant growth to date. GI consulted and performed EGD and ileoscopy. EGD with erythematous mucosa in the prepyloric region of the stomach. Ileoscopy revealed ulcers in the distal ileum. Biopsies pending. Abdominal U/S revealed mildly dilated bile ducts in the central right hepatic lobe and  the patient has uptrending transaminitis without elevated T reji. STAT MRCP ordered & hepatology consulted.     Interval History: Pt seen and examined by me this morning. SORAIDA CACERES. Patient reports continued RUQ and RLQ abdominal pain, decreased appetite, and nausea without vomiting. Reports changing her ileostomy 4x overnight. LFTs persistently increasing. Made NPO for stat MRCP this morning. Hepatology consulted.     Review of Systems   Constitutional:  Positive for appetite change. Negative for chills, fatigue and fever.   HENT:  Negative for trouble swallowing and voice change.    Eyes:  Negative for photophobia and visual disturbance.   Respiratory:  Negative for cough, chest tightness, shortness of breath and wheezing.    Cardiovascular:  Negative for chest pain, palpitations and leg swelling.   Gastrointestinal:  Positive for abdominal pain, diarrhea and nausea. Negative for blood in stool, constipation and vomiting.   Genitourinary:  Negative for difficulty urinating, dysuria, frequency, hematuria and urgency.   Musculoskeletal:  Negative for arthralgias, back pain and gait problem.   Skin:  Negative for color change and rash.   Neurological:  Negative for dizziness, seizures, speech difficulty, weakness, light-headedness and headaches.   Psychiatric/Behavioral:  Negative for behavioral problems, confusion and decreased concentration.      Objective:     Vital Signs (Most Recent):  Temp: 96.1 °F (35.6 °C) (01/04/24 0802)  Pulse: 66 (01/04/24 1107)  Resp: 18 (01/04/24 1033)  BP: 99/63 (01/04/24 0802)  SpO2: 96 % (01/04/24 0802) Vital Signs (24h Range):  Temp:  [96.1 °F (35.6 °C)-99.1 °F (37.3 °C)] 96.1 °F (35.6 °C)  Pulse:  [63-95] 66  Resp:  [16-20] 18  SpO2:  [96 %-98 %] 96 %  BP: ()/(58-95) 99/63     Weight: 50.9 kg (112 lb 3.2 oz)  Body mass index is 21.2 kg/m².    Intake/Output Summary (Last 24 hours) at 1/4/2024 1151  Last data filed at 1/4/2024 0549  Gross per 24 hour   Intake 410 ml   Output  680 ml   Net -270 ml         Physical Exam  Vitals and nursing note reviewed.   Constitutional:       General: She is not in acute distress.     Appearance: She is well-developed.   HENT:      Head: Normocephalic and atraumatic.      Mouth/Throat:      Mouth: Mucous membranes are dry.   Eyes:      Extraocular Movements: Extraocular movements intact.      Conjunctiva/sclera: Conjunctivae normal.   Cardiovascular:      Rate and Rhythm: Normal rate and regular rhythm.      Heart sounds: Normal heart sounds.   Pulmonary:      Effort: Pulmonary effort is normal. No respiratory distress.      Breath sounds: Normal breath sounds. No wheezing.   Abdominal:      General: Bowel sounds are normal. There is no distension.      Palpations: Abdomen is soft.      Tenderness: There is abdominal tenderness (R-sided). Positive signs include Chacon's sign.      Comments: Ileostomy in place without surrounding erythema or drainage   Musculoskeletal:         General: No tenderness. Normal range of motion.      Cervical back: Normal range of motion and neck supple.   Skin:     General: Skin is warm and dry.      Capillary Refill: Capillary refill takes less than 2 seconds.      Findings: No rash.   Neurological:      Mental Status: She is alert and oriented to person, place, and time.      Cranial Nerves: No cranial nerve deficit.      Sensory: No sensory deficit.      Coordination: Coordination normal.   Psychiatric:         Behavior: Behavior normal.         Thought Content: Thought content normal.         Judgment: Judgment normal.             Significant Labs: All pertinent labs within the past 24 hours have been reviewed.    Significant Imaging: I have reviewed all pertinent imaging results/findings within the past 24 hours.    Assessment/Plan:      * Crohn's disease of small intestine with complication  - 43 y.o. F with history of crohn's previously in remission presenting with severe and uncontrolled pain, increased ileostomy  output and frequency that is worsening throughout admission without evidence of infectious diarrhea. Ileoscopy revealing multiple ulcerations in IBD that was previously in remission. Per GI, biopsies are needed for definitive dx before more aggressive treatment with immunosuppressants are indicated.  - presentation consistent with Crohn's flare  - afebrile without leukocytosis  - inflammatory makers elevated and up-trending  - CT abd/pelvis shows inflammatory enteritis without abscess or obstruction   - EGD with erythematous mucosa in the prepyloric region of the stomach. Ileoscopy revealed ulcers in the distal ileum. Biopsies pending.   - Abdominal U/S revealed mildly dilated bile ducts in the central right hepatic lobe - see transaminitis   - GI consulted, appreciate recs:   - Follow up stool calprotectin  - Restart Entyvio outpatient once discharged  - Avoid NSAIDs since this may exacerbate IBD  - DVT prophylaxis (IBD pts increased risk of VTE)   - Opiates- if necessary, IV morphine is preferred due to short acting nature  - Bcx NGTD   - pain and nausea control    Transaminitis  Worsening  - AST 52 -> 79 -> 179  - Alk Phos 170 -> 245 -> 309  - ALT 44 -> 130   - Abdominal U/S revealed mildly dilated bile ducts in the central right hepatic lobe  - NPO for MRCP this am  - GI consulted and recommend formal hepatology consult (placed)     Generalized anxiety disorder  - continue klonopin, remeron, and nadolol    S/P ileostomy  - routine ostomy care      VTE Risk Mitigation (From admission, onward)           Ordered     enoxaparin injection 40 mg  Daily         12/31/23 1912     IP VTE LOW RISK PATIENT  Once         12/31/23 1912     Place sequential compression device  Until discontinued         12/31/23 1912                    Discharge Planning   CHRISTOPH: 1/5/2024     Code Status: Full Code   Is the patient medically ready for discharge?: No    Reason for patient still in hospital (select all that apply): Patient new  problem, Patient trending condition, Laboratory test, Treatment, Imaging, and Consult recommendations  Discharge Plan A: Home                  Candice Lee PA-C  Department of Hospital Medicine   [facility]

## 2024-01-04 NOTE — SUBJECTIVE & OBJECTIVE
Interval History: Pt seen and examined by me this morning. SORAIDA CACERES. Patient reports continued RUQ and RLQ abdominal pain, decreased appetite, and nausea without vomiting. Reports changing her ileostomy 4x overnight. LFTs persistently increasing. Made NPO for stat MRCP this morning. Hepatology consulted.     Review of Systems   Constitutional:  Positive for appetite change. Negative for chills, fatigue and fever.   HENT:  Negative for trouble swallowing and voice change.    Eyes:  Negative for photophobia and visual disturbance.   Respiratory:  Negative for cough, chest tightness, shortness of breath and wheezing.    Cardiovascular:  Negative for chest pain, palpitations and leg swelling.   Gastrointestinal:  Positive for abdominal pain, diarrhea and nausea. Negative for blood in stool, constipation and vomiting.   Genitourinary:  Negative for difficulty urinating, dysuria, frequency, hematuria and urgency.   Musculoskeletal:  Negative for arthralgias, back pain and gait problem.   Skin:  Negative for color change and rash.   Neurological:  Negative for dizziness, seizures, speech difficulty, weakness, light-headedness and headaches.   Psychiatric/Behavioral:  Negative for behavioral problems, confusion and decreased concentration.      Objective:     Vital Signs (Most Recent):  Temp: 96.1 °F (35.6 °C) (01/04/24 0802)  Pulse: 66 (01/04/24 1107)  Resp: 18 (01/04/24 1033)  BP: 99/63 (01/04/24 0802)  SpO2: 96 % (01/04/24 0802) Vital Signs (24h Range):  Temp:  [96.1 °F (35.6 °C)-99.1 °F (37.3 °C)] 96.1 °F (35.6 °C)  Pulse:  [63-95] 66  Resp:  [16-20] 18  SpO2:  [96 %-98 %] 96 %  BP: ()/(58-95) 99/63     Weight: 50.9 kg (112 lb 3.2 oz)  Body mass index is 21.2 kg/m².    Intake/Output Summary (Last 24 hours) at 1/4/2024 1156  Last data filed at 1/4/2024 0549  Gross per 24 hour   Intake 410 ml   Output 680 ml   Net -270 ml         Physical Exam  Vitals and nursing note reviewed.   Constitutional:       General:  She is not in acute distress.     Appearance: She is well-developed.   HENT:      Head: Normocephalic and atraumatic.      Mouth/Throat:      Mouth: Mucous membranes are dry.   Eyes:      Extraocular Movements: Extraocular movements intact.      Conjunctiva/sclera: Conjunctivae normal.   Cardiovascular:      Rate and Rhythm: Normal rate and regular rhythm.      Heart sounds: Normal heart sounds.   Pulmonary:      Effort: Pulmonary effort is normal. No respiratory distress.      Breath sounds: Normal breath sounds. No wheezing.   Abdominal:      General: Bowel sounds are normal. There is no distension.      Palpations: Abdomen is soft.      Tenderness: There is abdominal tenderness (R-sided). Positive signs include Chacon's sign.      Comments: Ileostomy in place without surrounding erythema or drainage   Musculoskeletal:         General: No tenderness. Normal range of motion.      Cervical back: Normal range of motion and neck supple.   Skin:     General: Skin is warm and dry.      Capillary Refill: Capillary refill takes less than 2 seconds.      Findings: No rash.   Neurological:      Mental Status: She is alert and oriented to person, place, and time.      Cranial Nerves: No cranial nerve deficit.      Sensory: No sensory deficit.      Coordination: Coordination normal.   Psychiatric:         Behavior: Behavior normal.         Thought Content: Thought content normal.         Judgment: Judgment normal.             Significant Labs: All pertinent labs within the past 24 hours have been reviewed.    Significant Imaging: I have reviewed all pertinent imaging results/findings within the past 24 hours.

## 2024-01-04 NOTE — PROGRESS NOTES
[facility] (Inflammatory Bowel Disease) Progress Note:    Reason for Consult:crohn's disease    Brief History:  Ivy Salgado is a 43 y.o. 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 GI consulted for concerns for an IBD flare. The patient reports that she has been more or less doing well off all IBD therapies for the past 4 years and was last seen in clinic by her GI, Dr. Ross in 7/2023. At her baseline, she will have to change her bag 2-3 times per day with no night time awakenings. She started to notice over the course of the last 1-2 weeks that her output had started to increase and that the appearance of the output was more flaky. She denies any bloody output from her bag. She has noted that she will now have to change her bag 5-6 times in a 24/hr period and that 2-3 of those will be at night time- awakening her with either pain or bloating sensation prompting her to note her bag has refilled. She has not been taking any therapies except for Marinol (which is chronic home med for her) and Loperamide- max 2 mg BID. She notes that she periodically uses Imodium if she is going to go somewhere to control her output, but over the last week it has not been prn, but more a regular 2 mg BID dosing for her. Despite this, her output has still been  increased. She notes associated nausea without vomiting, chills, bloating, reflux and dyspepsia type symptoms, prompting her to resume a prn  PPI. She does not report a fever. 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     Hospital course  - elevated LFTs workup in progress  - ileoscopy through stoma with mild recurrent ileitis with plans to restart entyvio as outpatient with induction followed by maintenance therapy    Interval History:  - changed ostomy x 4 times overnight  - requesting IVFs d/t poor PO intake  - LFTs increasing with now elevated ALT and AST/ALP higher though TB normal    IBD History      Current IBD Meds: none  Current GI Meds: none    Review of Systems   Constitutional:  Positive for malaise/fatigue. Negative for chills, fever and weight loss.   HENT:          No oral ulcers, dysphagia, oral thrush   Eyes:  Negative for blurred vision, pain and redness.   Respiratory:  Negative for cough and shortness of breath.    Cardiovascular:  Negative for chest pain.   Gastrointestinal:  Positive for diarrhea and nausea. Negative for abdominal pain, blood in stool, heartburn and vomiting.   Genitourinary:  Negative for dysuria and hematuria.   Musculoskeletal:  Negative for back pain and joint pain.   Skin:  Negative for rash.   Psychiatric/Behavioral:  Negative for depression. The patient is not nervous/anxious and does not have insomnia.          Prior Pertinent Surgeries:   1992- Unclear, colon resection to address fistulous disease process  1998- Unclear, colon resection to address abscess   2012- S/p total proctocolectomy with end ileostomy, post-op course complicated by perineal wound which she followed with [facility] for ~ 1 year (Dr. 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      Pertinent Endoscopy/Imagin23 CT A/P with contrast-  There is decompression of a few scattered mid small bowel loops noting distension and slow flow of the distal small bowel and associated wall hyperemia.  The ileostomy appears patent there is scattered interloop fluid and venous vascular engorgement involving the distended bowel loops, no findings to suggest abscess.     2021 MRE-no active inflammation.     2017- Ileoscopy- The examined portion of the ileum was normal.   Biopsied. Widely patent end ileostomy with healthy appearing mucosa at the ileostomy. Pathology showed fragments of unremarkable small bowel mucosa. No evidence of active colitis, granuloma, dysplasia or malignancy  10/2016- EGD- Normal esophagus. Small hiatus hernia. Erythematous mucosa in the gastric body. Biopsied. Trace of hematin in the gastric body.   Normal examined duodenum.  Two biopsies were obtained in the duodenal bulb. Four biopsies were obtained in the 2nd part of the duodenum.   10/2016- Ileoscopy- The examined portion of the ileum was normal. Biopsies without any acute abnormalities- CMV, HSV-1 AND HSV-2 stains performed. No celiac. No. H pylori. Normal mucosa of the small bowel.   10/2014- EGD- Normal Study. Biopsied to r/o Crohn's.    10/2014- Ileoscopy- The examined portion of the ileum was normal. Biopsies normal.  2014- SBE- A single (solitary) ulcer in the proximal ileum. Biopsied. Mild non-specific enteritis.   2013- Ileoscopy- Congested, eroded and ulcerated mucosa in the area at 5 cm proximal to the stoma. Biopsied. The examined portion of the ileum was normal otherwise up to 30 cm. Biopsied. FRAGMENTS OF NONNEOPLASTIC SMALL INTESTINAL MUCOSA WITH NO ACTIVE INFLAMMATION, ULCERATION OR DYSPLASIA PRESENT. THERE IS PRESERVATION OF THE VILLOUS ARCHITECTURE. FRAGMENTS OF NONNEOPLASTIC SMALL INTESTINAL MUCOSA WITH NO ACTIVE INFLAMMATION, ULCERATION OR DYSPLASIA IDENTIFIED.  2013- EGD- Normal esophagus.  Z-line regular, 36 cm from the incisors. A single gastric polyp. Resected and retrieved. A small amount of food (residue) in the stomach. Removal was successful. Mucosal abnormality in the duodenum. Biopsied. Mild chronic gastritis but otherwise negative.   5/2012- EGD- Normal Study.   5/2012- Colonoscopy- Crohn's colitis mostly involving the distal 30 cm. This was biopsied to r/o CMV, HSV. Mild ileocolonic anastomosis Crohn's disease. Chronic active colitis with surface ulceration, cryptitis, crypt abscess and reactive changes. Negative for CMV.     Therapeutic Drug Monitoring Labs:    Prior IBD Therapies:  Entyvio/Vedolizumab- most recent therapy, was on this from [id number] and then stopped as she was getting recurrent UTI's/pyelonephritis   Stelara/Ustekinumab- was on this prior to Entyvio and had multiple infections   Cimzia/Certolizumab- stopped working after some years, unclear on dates, records note she was on this in 2016   Remicade/Inflixmab- stopped working after many years, unsure about antibodies   Humira/Adalimumab- Suspected drug-induced lupus due to joint pains  Prednisone tapers- effective, caused avascular necrosis to her left shoulder requiring arthroplasty   Imuran/Azathioprine- stopped due to drug-induced pancreatitis   Methotrexate- stopped due to significant leukopenia  Sulfasalazine at some point prior to 2012  Entocort- at some point prior to 2012   Canasa- at some point prior to 2012    Inpatient Medications:     clonazePAM  1 mg Oral BID    droNABinol  5 mg Oral BID AC    enoxparin  40 mg Subcutaneous Daily    ferrous sulfate  1 tablet Oral Daily    gabapentin  300 mg Oral BID    lactated ringers  500 mL Intravenous Once    mirtazapine  30 mg Oral Nightly    multivitamin  1 tablet Oral Daily    nadoloL  40 mg Oral Daily    pantoprazole  40 mg Oral BID       Vital Signs:  BP 99/63 (BP Location: Right arm, Patient Position: Lying)   Pulse 71   Temp 96.1 °F (35.6 °C) (Oral)   Resp 20   Ht  "5' 1" (1.549 m)   Wt 50.9 kg (112 lb 3.2 oz)   LMP 03/30/2015   SpO2 96%   Breastfeeding No   BMI 21.20 kg/m²      Physical Exam  Vitals and nursing note reviewed.   Constitutional:       General: She is not in acute distress.     Appearance: She is not ill-appearing.   Cardiovascular:      Rate and Rhythm: Normal rate and regular rhythm.      Pulses: Normal pulses.   Pulmonary:      Effort: Pulmonary effort is normal. No respiratory distress.   Abdominal:      General: There is no distension.      Palpations: Abdomen is soft.      Tenderness: There is no abdominal tenderness.   Skin:     General: Skin is warm and dry.      Capillary Refill: Capillary refill takes 2 to 3 seconds.      Coloration: Skin is not jaundiced.   Neurological:      Mental Status: She is alert and oriented to person, place, and time.         Labs:   Lab Results   Component Value Date    WBC 5.91 01/04/2024    HGB 10.9 (L) 01/04/2024    HCT 33.5 (L) 01/04/2024    MCV 90 01/04/2024     01/04/2024     Lab Results   Component Value Date    CREATININE 0.7 01/04/2024    ALBUMIN 2.9 (L) 01/04/2024    BILITOT 0.3 01/04/2024    ALKPHOS 309 (H) 01/04/2024     (H) 01/04/2024     (H) 01/04/2024     Lab Results   Component Value Date    CRP 33.2 (H) 01/02/2024    CALPROTECTIN <27.1 12/06/2021     Lab Results   Component Value Date    HEPBSAG Non-reactive 09/19/2023    HEPBCAB Non-reactive 01/02/2024     Lab Results   Component Value Date    TBGOLDPLUS Negative 01/02/2024     Lab Results   Component Value Date    VQYGMLUA93SQ 21 (L) 06/13/2019    FENRMSEA79 278 01/02/2024       Microbiology Results (last 7 days)       Procedure Component Value Units Date/Time    Blood culture #1 **CANNOT BE ORDERED STAT** [[id number]] Collected: 12/31/23 1924    Order Status: Completed Specimen: Blood from Peripheral, Antecubital, Right Updated: 01/03/24 2212     Blood Culture, Routine No Growth to date      No Growth to date      No Growth to " date      No Growth to date    Blood culture #2 **CANNOT BE ORDERED STAT** [[id number]] Collected: 12/31/23 1908    Order Status: Completed Specimen: Blood from Line, Port A Cath Updated: 01/03/24 2212     Blood Culture, Routine No Growth to date      No Growth to date      No Growth to date      No Growth to date    Stool culture **CANNOT BE ORDERED STAT** [[id number]] Collected: 12/31/23 2122    Order Status: Completed Specimen: Stool Updated: 01/03/24 1028     Stool Culture No Salmonella,Shigella,Vibrio,Campylobacter,Yersinia isolated.    E. coli 0157 antigen [[id number]] Collected: 12/31/23 2122    Order Status: Completed Specimen: Stool Updated: 01/02/24 0731     Shiga Toxin 1 E.coli Negative     Shiga Toxin 2 E.coli Negative    Clostridium difficile EIA [[id number]] Collected: 12/31/23 2122    Order Status: Completed Specimen: Stool Updated: 01/01/24 1204     C. diff Antigen Negative     C difficile Toxins A+B, EIA Negative     Comment: Testing not recommended for children <24 months old.                Impression:  Ivy Salgado is a 43 y.o. female  with ARPITA/Depression, GERD, Long QTc syndrome (Type III, on nadolol), s/p SHELLIE (2015) for recurrent ovarian cysts and endometriosis, history of melanoma in situ (buttocks and para-stomal site, excised in 2018 and 2019 respectively) and Crohn's disease with small and large intestine involvement (diagnosed in 1990; terminal ileum involvement per patient) s/p total proctocolectomy with end ileostomy (6/2012) off all therapies since 2019 that presents to [facility] on 12/31 for evaluation of intermittent generalized abdominal pain (more concentrated in the RUQ and RLQ), increased ostomy output, poor po intake and nausea without vomiting for the last week. GI consulted for concerns for an IBD flare.      This morning patient reports having to change her ostomy 4x overnight. She reports she is not eating and drinking like she feels she should and would like to start IVFs. Her  LFTs trended up again this AM. Primary team plans to complete an MRCP STAT to assess biliary dilation seen on liver u/s completed yesterday.     Yesterday restarting Entyvio was discussed with her to treat her recurrent crohn's seen on ileoscopy. She was agreeable to this and to transitioning care to IBD clinic from Dr. Ross.     Recurrent Crohn's Disease  Elevated LFTs    Plan:  - Recommend MRCP - primary team ordered this AM  - Consult hepatology  - Follow up stool calprotectin  - Restart Entyvio outpatient once discharged  - Avoid NSAIDs since this may exacerbate IBD  - DVT prophylaxis (IBD pts increased risk of VTE)   - Opiates- if necessary, IV morphine is preferred due to short acting nature      Shelby Zhong NP   Department of Gastroenterology  Inflammatory Bowel Disease

## 2024-01-04 NOTE — ASSESSMENT & PLAN NOTE
- AST 52 -> 79 -> 179  - Alk Phos 170 -> 245 -> 309  - ALT 44 -> 130   - Abdominal U/S revealed mildly dilated bile ducts in the central right hepatic lobe  - NPO for MRCP this am  - GI consulted and recommend formal hepatology consult (placed)

## 2024-01-04 NOTE — ASSESSMENT & PLAN NOTE
- 43 y.o. F with history of crohn's previously in remission presenting with severe and uncontrolled pain, increased ileostomy output and frequency without evidence of infectious diarrhea. Ileoscopy revealing multiple ulcerations in IBD that was previously in remission. Per GI, biopsies are needed for definitive dx before more aggressive treatment with immunosuppressants are indicated.  - presentation consistent with Crohn's flare  - afebrile without leukocytosis  - inflammatory makers elevated and up-trending  - CT abd/pelvis shows inflammatory enteritis without abscess or obstruction   - EGD with erythematous mucosa in the prepyloric region of the stomach. Ileoscopy revealed ulcers in the distal ileum. Biopsies pending.   - Abdominal U/S revealed mildly dilated bile ducts in the central right hepatic lobe - see transaminitis   - GI consulted, appreciate recs:   - Recommend outpatient referral to [facility] pancreatic cyst clinic on discharge  - Follow up stool calprotectin  - Restart Entyvio outpatient once discharged  - Avoid NSAIDs since this may exacerbate IBD  - DVT prophylaxis (IBD pts increased risk of VTE)   - Opiates- if necessary, IV morphine is preferred due to short acting nature  - Bcx NGTD   - pain and nausea control

## 2024-01-04 NOTE — ASSESSMENT & PLAN NOTE
- presentation at admission consistent with Crohn's flare  - afebrile without leukocytosis  - inflammatory makers elevated and up-trending  - CT abd/pelvis shows inflammatory enteritis without abscess or obstruction   - EGD with erythematous mucosa in the prepyloric region of the stomach. Ileoscopy revealed ulcers in the distal ileum. Biopsies pending.   - Abdominal U/S revealed mildly dilated bile ducts in the central right hepatic lobe - see transaminitis   - GI consulted, appreciate recs:   - Follow up stool calprotectin  - Restart Entyvio outpatient once discharged  - Avoid NSAIDs since this may exacerbate IBD  - DVT prophylaxis (IBD pts increased risk of VTE)   - Opiates- if necessary, IV morphine is preferred due to short acting nature  - Bcx NGTD   - pain and nausea control

## 2024-01-04 NOTE — PLAN OF CARE
Problem: Infection  Goal: Absence of Infection Signs and Symptoms  1/3/2024 1843 by Marino Fuller RN  Outcome: Ongoing, Progressing  1/3/2024 1145 by Marino Fuller RN  Outcome: Ongoing, Progressing     Problem: Adult Inpatient Plan of Care  Goal: Plan of Care Review  1/3/2024 1843 by Marino Fuller RN  Outcome: Ongoing, Progressing  1/3/2024 1145 by Marino Fuller RN  Outcome: Ongoing, Progressing  Goal: Patient-Specific Goal (Individualized)  1/3/2024 1843 by Marino Fuller RN  Outcome: Ongoing, Progressing  1/3/2024 1145 by Marino Fuller RN  Outcome: Ongoing, Progressing  Goal: Absence of Hospital-Acquired Illness or Injury  1/3/2024 1843 by Marino Fuller RN  Outcome: Ongoing, Progressing  1/3/2024 1145 by Marino Fuller RN  Outcome: Ongoing, Progressing  Goal: Optimal Comfort and Wellbeing  1/3/2024 1843 by Marino Fuller RN  Outcome: Ongoing, Progressing  1/3/2024 1145 by Marino Fuller RN  Outcome: Ongoing, Progressing  Goal: Readiness for Transition of Care  Outcome: Ongoing, Progressing     Problem: Pain Acute  Goal: Acceptable Pain Control and Functional Ability  Outcome: Ongoing, Progressing     Problem: Activity and Energy Impairment (Anxiety Signs/Symptoms)  Goal: Optimized Energy Level (Anxiety Signs/Symptoms)  Outcome: Ongoing, Progressing     Problem: Cognitive Impairment (Anxiety Signs/Symptoms)  Goal: Optimized Cognitive Function (Anxiety Signs/Symptoms)  Outcome: Ongoing, Progressing     Problem: Mood Impairment (Anxiety Signs/Symptoms)  Goal: Improved Mood Symptoms (Anxiety Signs/Symptoms)  Outcome: Ongoing, Progressing     Problem: Sleep Impairment (Anxiety Signs/Symptoms)  Goal: Improved Sleep (Anxiety Signs/Symptoms)  Outcome: Ongoing, Progressing     Problem: Social, Occupational or Functional Impairment (Anxiety Signs/Symptoms)  Goal: Enhanced Social, Occupational or Functional Skills (Anxiety Signs/Symptoms)  Outcome: Ongoing, Progressing     Problem:  Somatic Disturbance (Anxiety Signs/Symptoms)  Goal: Improved Somatic Symptoms (Anxiety Signs/Symptoms)  Outcome: Ongoing, Progressing     Problem: Adjustment to Illness (Bowel Disease, Inflammatory)  Goal: Optimal Adaptation to Chronic Illness  Outcome: Ongoing, Progressing     Problem: Diarrhea (Bowel Disease, Inflammatory)  Goal: Diarrhea Symptom Relief  Outcome: Ongoing, Progressing     Problem: Infection (Bowel Disease, Inflammatory)  Goal: Absence of Infection Signs and Symptoms  Outcome: Ongoing, Progressing     Problem: Nutrition Impaired (Bowel Disease, Inflammatory)  Goal: Optimal Nutrition  Outcome: Ongoing, Progressing     Problem: Pain (Bowel Disease, Inflammatory)  Goal: Acceptable Pain Control  Outcome: Ongoing, Progressing     Problem: Skin Injury Risk Increased  Goal: Skin Health and Integrity  Outcome: Ongoing, Progressing

## 2024-01-05 LAB
ALBUMIN SERPL BCP-MCNC: 2.8 G/DL (ref 3.5–5.2)
ALP SERPL-CCNC: 267 U/L (ref 55–135)
ALT SERPL W/O P-5'-P-CCNC: 88 U/L (ref 10–44)
ANION GAP SERPL CALC-SCNC: 10 MMOL/L (ref 8–16)
AST SERPL-CCNC: 66 U/L (ref 10–40)
BACTERIA BLD CULT: NORMAL
BACTERIA BLD CULT: NORMAL
BASOPHILS # BLD AUTO: 0.03 K/UL (ref 0–0.2)
BASOPHILS NFR BLD: 0.6 % (ref 0–1.9)
BILIRUB SERPL-MCNC: 0.3 MG/DL (ref 0.1–1)
BUN SERPL-MCNC: 6 MG/DL (ref 6–20)
CALCIUM SERPL-MCNC: 8.9 MG/DL (ref 8.7–10.5)
CHLORIDE SERPL-SCNC: 105 MMOL/L (ref 95–110)
CO2 SERPL-SCNC: 27 MMOL/L (ref 23–29)
CREAT SERPL-MCNC: 0.7 MG/DL (ref 0.5–1.4)
DIFFERENTIAL METHOD BLD: ABNORMAL
EOSINOPHIL # BLD AUTO: 0.2 K/UL (ref 0–0.5)
EOSINOPHIL NFR BLD: 3.6 % (ref 0–8)
ERYTHROCYTE [DISTWIDTH] IN BLOOD BY AUTOMATED COUNT: 13.3 % (ref 11.5–14.5)
EST. GFR  (NO RACE VARIABLE): >60 ML/MIN/1.73 M^2
GLUCOSE SERPL-MCNC: 117 MG/DL (ref 70–110)
HCT VFR BLD AUTO: 34.9 % (ref 37–48.5)
HGB BLD-MCNC: 11.2 G/DL (ref 12–16)
IMM GRANULOCYTES # BLD AUTO: 0.02 K/UL (ref 0–0.04)
IMM GRANULOCYTES NFR BLD AUTO: 0.4 % (ref 0–0.5)
LYMPHOCYTES # BLD AUTO: 1.9 K/UL (ref 1–4.8)
LYMPHOCYTES NFR BLD: 35.4 % (ref 18–48)
MAGNESIUM SERPL-MCNC: 1.8 MG/DL (ref 1.6–2.6)
MCH RBC QN AUTO: 29.4 PG (ref 27–31)
MCHC RBC AUTO-ENTMCNC: 32.1 G/DL (ref 32–36)
MCV RBC AUTO: 92 FL (ref 82–98)
MITOCHONDRIA AB TITR SER IF: NORMAL {TITER}
MONOCYTES # BLD AUTO: 0.7 K/UL (ref 0.3–1)
MONOCYTES NFR BLD: 13.5 % (ref 4–15)
NEUTROPHILS # BLD AUTO: 2.5 K/UL (ref 1.8–7.7)
NEUTROPHILS NFR BLD: 46.5 % (ref 38–73)
NRBC BLD-RTO: 0 /100 WBC
NUDT15 GENOTYPE: NORMAL
NUDT15 PHENOTYPE: NORMAL
PHOSPHATE SERPL-MCNC: 5.6 MG/DL (ref 2.7–4.5)
PLATELET # BLD AUTO: 353 K/UL (ref 150–450)
PMV BLD AUTO: 9.4 FL (ref 9.2–12.9)
POTASSIUM SERPL-SCNC: 3.9 MMOL/L (ref 3.5–5.1)
PROT SERPL-MCNC: 6.9 G/DL (ref 6–8.4)
RBC # BLD AUTO: 3.81 M/UL (ref 4–5.4)
SMOOTH MUSCLE AB TITR SER IF: NORMAL {TITER}
SODIUM SERPL-SCNC: 142 MMOL/L (ref 136–145)
TPMT ADDITIONAL INFORMATION: NORMAL
TPMT DISCLAIMER: NORMAL
TPMT GENOTYPE RESULT: NORMAL
TPMT INTERPRETATION: NORMAL
TPMT METHOD: NORMAL
TPMT PHENOTYPE: NORMAL
TPMT REVIEWED BY: NORMAL
WBC # BLD AUTO: 5.26 K/UL (ref 3.9–12.7)

## 2024-01-05 PROCEDURE — 63600175 PHARM REV CODE 636 W HCPCS: Performed by: PHYSICIAN ASSISTANT

## 2024-01-05 PROCEDURE — 86694 HERPES SIMPLEX NES ANTBDY: CPT

## 2024-01-05 PROCEDURE — 99233 SBSQ HOSP IP/OBS HIGH 50: CPT | Mod: FS,,,

## 2024-01-05 PROCEDURE — 83735 ASSAY OF MAGNESIUM: CPT | Performed by: HOSPITALIST

## 2024-01-05 PROCEDURE — 36415 COLL VENOUS BLD VENIPUNCTURE: CPT | Performed by: HOSPITALIST

## 2024-01-05 PROCEDURE — 25000003 PHARM REV CODE 250: Performed by: FAMILY MEDICINE

## 2024-01-05 PROCEDURE — 63600175 PHARM REV CODE 636 W HCPCS

## 2024-01-05 PROCEDURE — 21400001 HC TELEMETRY ROOM

## 2024-01-05 PROCEDURE — 87799 DETECT AGENT NOS DNA QUANT: CPT

## 2024-01-05 PROCEDURE — 84100 ASSAY OF PHOSPHORUS: CPT | Performed by: HOSPITALIST

## 2024-01-05 PROCEDURE — 25000003 PHARM REV CODE 250: Performed by: PHYSICIAN ASSISTANT

## 2024-01-05 PROCEDURE — 80053 COMPREHEN METABOLIC PANEL: CPT | Performed by: HOSPITALIST

## 2024-01-05 PROCEDURE — 99223 1ST HOSP IP/OBS HIGH 75: CPT | Mod: FS,,, | Performed by: INTERNAL MEDICINE

## 2024-01-05 PROCEDURE — 85025 COMPLETE CBC W/AUTO DIFF WBC: CPT | Performed by: HOSPITALIST

## 2024-01-05 PROCEDURE — 25000003 PHARM REV CODE 250

## 2024-01-05 RX ORDER — SODIUM CHLORIDE, SODIUM LACTATE, POTASSIUM CHLORIDE, CALCIUM CHLORIDE 600; 310; 30; 20 MG/100ML; MG/100ML; MG/100ML; MG/100ML
INJECTION, SOLUTION INTRAVENOUS CONTINUOUS
Status: ACTIVE | OUTPATIENT
Start: 2024-01-05 | End: 2024-01-05

## 2024-01-05 RX ADMIN — PANTOPRAZOLE SODIUM 40 MG: 40 TABLET, DELAYED RELEASE ORAL at 09:01

## 2024-01-05 RX ADMIN — MORPHINE SULFATE 30 MG: 15 TABLET ORAL at 10:01

## 2024-01-05 RX ADMIN — DRONABINOL 5 MG: 2.5 CAPSULE ORAL at 05:01

## 2024-01-05 RX ADMIN — THERA TABS 1 TABLET: TAB at 10:01

## 2024-01-05 RX ADMIN — PROCHLORPERAZINE EDISYLATE 5 MG: 5 INJECTION INTRAMUSCULAR; INTRAVENOUS at 02:01

## 2024-01-05 RX ADMIN — ENOXAPARIN SODIUM 40 MG: 40 INJECTION SUBCUTANEOUS at 05:01

## 2024-01-05 RX ADMIN — MIRTAZAPINE 30 MG: 15 TABLET, ORALLY DISINTEGRATING ORAL at 10:01

## 2024-01-05 RX ADMIN — CLONAZEPAM 1 MG: 0.5 TABLET ORAL at 10:01

## 2024-01-05 RX ADMIN — HYDROMORPHONE HYDROCHLORIDE 0.5 MG: 1 INJECTION, SOLUTION INTRAMUSCULAR; INTRAVENOUS; SUBCUTANEOUS at 05:01

## 2024-01-05 RX ADMIN — MORPHINE SULFATE 30 MG: 15 TABLET ORAL at 09:01

## 2024-01-05 RX ADMIN — DRONABINOL 5 MG: 2.5 CAPSULE ORAL at 04:01

## 2024-01-05 RX ADMIN — GABAPENTIN 300 MG: 300 CAPSULE ORAL at 10:01

## 2024-01-05 RX ADMIN — PANTOPRAZOLE SODIUM 40 MG: 40 TABLET, DELAYED RELEASE ORAL at 10:01

## 2024-01-05 RX ADMIN — HYDROMORPHONE HYDROCHLORIDE 0.5 MG: 1 INJECTION, SOLUTION INTRAMUSCULAR; INTRAVENOUS; SUBCUTANEOUS at 02:01

## 2024-01-05 RX ADMIN — PROCHLORPERAZINE EDISYLATE 5 MG: 5 INJECTION INTRAMUSCULAR; INTRAVENOUS at 05:01

## 2024-01-05 RX ADMIN — NADOLOL 40 MG: 40 TABLET ORAL at 10:01

## 2024-01-05 RX ADMIN — SODIUM CHLORIDE, POTASSIUM CHLORIDE, SODIUM LACTATE AND CALCIUM CHLORIDE: 600; 310; 30; 20 INJECTION, SOLUTION INTRAVENOUS at 01:01

## 2024-01-05 RX ADMIN — FERROUS SULFATE TAB EC 325 MG (65 MG FE EQUIVALENT) 1 EACH: 325 (65 FE) TABLET DELAYED RESPONSE at 10:01

## 2024-01-05 NOTE — PROGRESS NOTES
[facility] [address] [place] [address] Medicine  Progress Note    Patient Name: Ivy Salgado  MRN: [id number]  Patient Class: IP- Inpatient   Admission Date: 12/31/2023  Length of Stay: 3 days  Attending Physician: Rosita Mendez MD  Primary Care Provider: Luis Madden DO        Subjective:     Principal Problem:Crohn's disease of small intestine with complication        HPI:  Ivy Salgado is a 43 y.o. female with a PMHx of ARPITA, Crohn's s/p ileostomy on Entyvio who presents to [facility] for evaluation of abdominal pain. Patient reprots intermittnet severe generalized abdominal pain for the past 4 days. Pain is worse on her right side and occasionally radiates to her flank. Endorses associated increased ostomy output, poor appetite, and nausea without any vomiting. She feels flushed and had increased temp at home but no fever (reported Tmax 100.2). Symptoms are consistent with prior Crohn's flares, has not had a flare since 2020.  Denies dark/bloody stools, HA, vision changes, chest pain, SOB, LE swelling or syncope.    ED: AFVSS. No leukocytosis or electrolyte abnormalities. ESR 94, CRP 22.8. UA noninfectious. CT abd/pelvis shows slow flow through a few mid to distal small bowel loops noting venous vascular engorgement about these loops and wall hyperemia concerning for inflammatory enteritis. ED provider spoke with GI who recommend holding antibiotics for now and NPO at midnight for potential scope in AM. Given IV dilaudid 1mg x2 and IV compazine.     Overview/Hospital Course:  Ms. Salgado was admitted to hospital medicine for inflammatory enteritis and suspected acute crohn's flare. C. Diff negative, stool culture without significant growth to date. GI consulted and performed EGD and ileoscopy. EGD with erythematous mucosa in the prepyloric region of the stomach. Ileoscopy revealed ulcers in the distal ileum. Biopsies pending. Abdominal U/S revealed mildly dilated bile ducts in the central right hepatic lobe and  the patient has uptrending transaminitis without elevated T reji. 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 Hepatology consulted: awaiting serology results. Will discharge patient when medically ready.     Interval History: Patient seen and assessed at bedside. Afebrile, VSS. Patient reporting poor PO intake. Encouraged patient to continue intake as tolerated. Patient still complaining of significant r-sided pain. Reports significant output via ostomy - reports having to change it x6 in the last 24 hours. Still following pending serologies. GI and hepatology following. Pending new labs per consult recommendations.      Review of Systems   Constitutional:  Positive for appetite change. Negative for chills, fatigue and fever.   HENT:  Negative for trouble swallowing and voice change.    Eyes:  Negative for photophobia and visual disturbance.   Respiratory:  Negative for cough, chest tightness, shortness of breath and wheezing.    Cardiovascular:  Negative for chest pain, palpitations and leg swelling.   Gastrointestinal:  Positive for abdominal pain, diarrhea and nausea. Negative for blood in stool, constipation and vomiting.   Genitourinary:  Negative for difficulty urinating, dysuria, frequency, hematuria and urgency.   Musculoskeletal:  Negative for arthralgias, back pain and gait problem.   Skin:  Negative for color change and rash.   Neurological:  Negative for dizziness, seizures, speech difficulty, weakness, light-headedness and headaches.   Psychiatric/Behavioral:  Negative for behavioral problems, confusion and decreased concentration.      Objective:     Vital Signs (Most Recent):  Temp: 96.3 °F (35.7 °C) (01/05/24 1125)  Pulse: 83 (01/05/24 1125)  Resp: 18 (01/05/24 1125)  BP: 113/73 (01/05/24 1125)  SpO2: 99 % (01/05/24 1125) Vital Signs (24h Range):  Temp:  [96.3 °F (35.7 °C)-98.3 °F (36.8 °C)] 96.3 °F  (35.7 °C)  Pulse:  [64-83] 83  Resp:  [16-18] 18  SpO2:  [95 %-99 %] 99 %  BP: (101-122)/(59-76) 113/73     Weight: 50.9 kg (112 lb 3.2 oz)  Body mass index is 21.2 kg/m².    Intake/Output Summary (Last 24 hours) at 1/5/2024 1244  Last data filed at 1/4/2024 1944  Gross per 24 hour   Intake 480 ml   Output --   Net 480 ml         Physical Exam  Vitals and nursing note reviewed.   Constitutional:       General: She is not in acute distress.     Appearance: She is well-developed.   HENT:      Head: Normocephalic and atraumatic.      Mouth/Throat:      Mouth: Mucous membranes are dry.   Eyes:      Extraocular Movements: Extraocular movements intact.      Conjunctiva/sclera: Conjunctivae normal.   Cardiovascular:      Rate and Rhythm: Normal rate and regular rhythm.      Heart sounds: Normal heart sounds.   Pulmonary:      Effort: Pulmonary effort is normal. No respiratory distress.      Breath sounds: Normal breath sounds. No wheezing.   Abdominal:      General: Bowel sounds are normal. There is no distension.      Palpations: Abdomen is soft.      Tenderness: There is abdominal tenderness (R-sided).      Comments: Ileostomy in place without surrounding erythema or drainage   Musculoskeletal:         General: No tenderness. Normal range of motion.      Cervical back: Normal range of motion and neck supple.   Skin:     General: Skin is warm and dry.      Capillary Refill: Capillary refill takes less than 2 seconds.      Findings: No rash.   Neurological:      Mental Status: She is alert and oriented to person, place, and time.      Cranial Nerves: No cranial nerve deficit.      Sensory: No sensory deficit.      Coordination: Coordination normal.   Psychiatric:         Behavior: Behavior normal.         Thought Content: Thought content normal.         Judgment: Judgment normal.             Significant Labs: All pertinent labs within the past 24 hours have been reviewed.  CMP:   Recent Labs   Lab 01/04/24  0400  01/05/24  0455    142   K 4.0 3.9    105   CO2 27 27    117*   BUN 6 6   CREATININE 0.7 0.7   CALCIUM 9.2 8.9   PROT 7.0 6.9   ALBUMIN 2.9* 2.8*   BILITOT 0.3 0.3   ALKPHOS 309* 267*   * 66*   * 88*   ANIONGAP 9 10       Significant Imaging: I have reviewed all pertinent imaging results/findings within the past 24 hours.    Assessment/Plan:      * Crohn's disease of small intestine with complication  - 43 y.o. F with history of crohn's previously in remission presenting with severe and uncontrolled pain, increased ileostomy output and frequency without evidence of infectious diarrhea. Ileoscopy revealing multiple ulcerations in IBD that was previously in remission. Per GI, biopsies are needed for definitive dx before more aggressive treatment with immunosuppressants are indicated.  - presentation consistent with Crohn's flare  - afebrile without leukocytosis  - inflammatory makers elevated and up-trending  - CT abd/pelvis shows inflammatory enteritis without abscess or obstruction   - EGD with erythematous mucosa in the prepyloric region of the stomach. Ileoscopy revealed ulcers in the distal ileum. Biopsies pending.   - Abdominal U/S revealed mildly dilated bile ducts in the central right hepatic lobe - see transaminitis   - GI consulted, appreciate recs:   - Recommend outpatient referral to [facility] pancreatic cyst clinic on discharge  - Follow up stool calprotectin  - Restart Entyvio outpatient once discharged  - Avoid NSAIDs since this may exacerbate IBD  - DVT prophylaxis (IBD pts increased risk of VTE)   - Opiates- if necessary, IV morphine is preferred due to short acting nature  - Bcx NGTD   - pain and nausea control      Transaminitis  Improving  - AST 52 -> 79 -> 179 -> 66  - Alk Phos 170 -> 245 -> 309 -> 267  - ALT 44 -> 130 -> 88  - Abdominal U/S revealed mildly dilated bile ducts in the central right hepatic lobe  - NPO for MRCP this am  - GI consulted and recommend formal  hepatology consult (placed):   - Await results from pending serological workup- IgG, repeat HAV IgM and HCV, SAURAV/AMA/ASMA   - A1AT, ceruloplasmin, iron studies completed and reassuring  - Obtain EBV, CMV and HSV   - MRCP completed and with one punctate tail cyst (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), no dilation     Generalized anxiety disorder  - continue klonopin, remeron, and nadolol    S/P ileostomy  - routine ostomy care      VTE Risk Mitigation (From admission, onward)           Ordered     enoxaparin injection 40 mg  Daily         12/31/23 1912     IP VTE LOW RISK PATIENT  Once         12/31/23 1912     Place sequential compression device  Until discontinued         12/31/23 1912                    Discharge Planning   CHRISTOPH: 1/5/2024     Code Status: Full Code   Is the patient medically ready for discharge?: No    Reason for patient still in hospital (select all that apply): Patient trending condition, Treatment, and Consult recommendations  Discharge Plan A: Home                  Naomy Gary PA-C  Department of Hospital Medicine   [facility]

## 2024-01-05 NOTE — SUBJECTIVE & OBJECTIVE
Interval History: Patient seen and assessed at bedside. Afebrile, VSS. Patient reporting poor PO intake. Encouraged patient to continue intake as tolerated. Patient still complaining of significant r-sided pain. Reports significant output via ostomy - reports having to change it x6 in the last 24 hours. Still following pending serologies. GI and hepatology following. Pending new labs per consult recommendations.      Review of Systems   Constitutional:  Positive for appetite change. Negative for chills, fatigue and fever.   HENT:  Negative for trouble swallowing and voice change.    Eyes:  Negative for photophobia and visual disturbance.   Respiratory:  Negative for cough, chest tightness, shortness of breath and wheezing.    Cardiovascular:  Negative for chest pain, palpitations and leg swelling.   Gastrointestinal:  Positive for abdominal pain, diarrhea and nausea. Negative for blood in stool, constipation and vomiting.   Genitourinary:  Negative for difficulty urinating, dysuria, frequency, hematuria and urgency.   Musculoskeletal:  Negative for arthralgias, back pain and gait problem.   Skin:  Negative for color change and rash.   Neurological:  Negative for dizziness, seizures, speech difficulty, weakness, light-headedness and headaches.   Psychiatric/Behavioral:  Negative for behavioral problems, confusion and decreased concentration.      Objective:     Vital Signs (Most Recent):  Temp: 96.3 °F (35.7 °C) (01/05/24 1125)  Pulse: 83 (01/05/24 1125)  Resp: 18 (01/05/24 1125)  BP: 113/73 (01/05/24 1125)  SpO2: 99 % (01/05/24 1125) Vital Signs (24h Range):  Temp:  [96.3 °F (35.7 °C)-98.3 °F (36.8 °C)] 96.3 °F (35.7 °C)  Pulse:  [64-83] 83  Resp:  [16-18] 18  SpO2:  [95 %-99 %] 99 %  BP: (101-122)/(59-76) 113/73     Weight: 50.9 kg (112 lb 3.2 oz)  Body mass index is 21.2 kg/m².    Intake/Output Summary (Last 24 hours) at 1/5/2024 1244  Last data filed at 1/4/2024 1944  Gross per 24 hour   Intake 480 ml   Output --    Net 480 ml         Physical Exam  Vitals and nursing note reviewed.   Constitutional:       General: She is not in acute distress.     Appearance: She is well-developed.   HENT:      Head: Normocephalic and atraumatic.      Mouth/Throat:      Mouth: Mucous membranes are dry.   Eyes:      Extraocular Movements: Extraocular movements intact.      Conjunctiva/sclera: Conjunctivae normal.   Cardiovascular:      Rate and Rhythm: Normal rate and regular rhythm.      Heart sounds: Normal heart sounds.   Pulmonary:      Effort: Pulmonary effort is normal. No respiratory distress.      Breath sounds: Normal breath sounds. No wheezing.   Abdominal:      General: Bowel sounds are normal. There is no distension.      Palpations: Abdomen is soft.      Tenderness: There is abdominal tenderness (R-sided).      Comments: Ileostomy in place without surrounding erythema or drainage   Musculoskeletal:         General: No tenderness. Normal range of motion.      Cervical back: Normal range of motion and neck supple.   Skin:     General: Skin is warm and dry.      Capillary Refill: Capillary refill takes less than 2 seconds.      Findings: No rash.   Neurological:      Mental Status: She is alert and oriented to person, place, and time.      Cranial Nerves: No cranial nerve deficit.      Sensory: No sensory deficit.      Coordination: Coordination normal.   Psychiatric:         Behavior: Behavior normal.         Thought Content: Thought content normal.         Judgment: Judgment normal.             Significant Labs: All pertinent labs within the past 24 hours have been reviewed.  CMP:   Recent Labs   Lab 01/04/24  0400 01/05/24  0455    142   K 4.0 3.9    105   CO2 27 27    117*   BUN 6 6   CREATININE 0.7 0.7   CALCIUM 9.2 8.9   PROT 7.0 6.9   ALBUMIN 2.9* 2.8*   BILITOT 0.3 0.3   ALKPHOS 309* 267*   * 66*   * 88*   ANIONGAP 9 10       Significant Imaging: I have reviewed all pertinent imaging  results/findings within the past 24 hours.

## 2024-01-05 NOTE — PLAN OF CARE
Problem: Infection  Goal: Absence of Infection Signs and Symptoms  Outcome: Ongoing, Progressing  Intervention: Prevent or Manage Infection  Flowsheets (Taken 1/5/2024 0569)  Fever Reduction/Comfort Measures: lightweight bedding  Infection Management: aseptic technique maintained     Problem: Adult Inpatient Plan of Care  Goal: Plan of Care Review  Outcome: Ongoing, Progressing  Goal: Patient-Specific Goal (Individualized)  Outcome: Ongoing, Progressing  Goal: Absence of Hospital-Acquired Illness or Injury  Outcome: Ongoing, Progressing  Goal: Optimal Comfort and Wellbeing  Outcome: Ongoing, Progressing  Goal: Readiness for Transition of Care  Outcome: Ongoing, Progressing     Problem: Pain Acute  Goal: Acceptable Pain Control and Functional Ability  Outcome: Ongoing, Progressing     Problem: Cognitive Impairment (Anxiety Signs/Symptoms)  Goal: Optimized Cognitive Function (Anxiety Signs/Symptoms)  Outcome: Ongoing, Progressing     Problem: Mood Impairment (Anxiety Signs/Symptoms)  Goal: Improved Mood Symptoms (Anxiety Signs/Symptoms)  Outcome: Ongoing, Progressing

## 2024-01-05 NOTE — PLAN OF CARE
[facility]  Discharge Reassessment    Primary Care Provider: Luis Madden DO    Expected Discharge Date: 1/6/2024    Reassessment (most recent)       Discharge Reassessment - 01/05/24 1313          Discharge Reassessment    Assessment Type Discharge Planning Reassessment     Did the patient's condition or plan change since previous assessment? No     Discharge Plan discussed with: Patient     Communicated CHRISTOPH with patient/caregiver Yes     Discharge Plan A Home     Discharge Plan B Home     DME Needed Upon Discharge  none     Transition of Care Barriers None     Why the patient remains in the hospital Requires continued medical care        Post-Acute Status    Discharge Delays None known at this time                   Overview/Hospital Course:  Ms. Salgado was admitted to hospital medicine for inflammatory enteritis and suspected acute crohn's flare. C. Diff negative, stool culture without significant growth to date. GI consulted and performed EGD and ileoscopy. EGD with erythematous mucosa in the prepyloric region of the stomach. Ileoscopy revealed ulcers in the distal ileum. Biopsies pending. Abdominal U/S revealed mildly dilated bile ducts in the central right hepatic lobe and the patient has uptrending transaminitis without elevated T reji. 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 Hepatology consulted: awaiting serology results. Will discharge patient when medically ready.      Interval History: Patient seen and assessed at bedside. Afebrile, VSS. Patient reporting poor PO intake. Encouraged patient to continue intake as tolerated. Patient still complaining of significant r-sided pain. Reports significant output via ostomy - reports having to change it x6 in the last 24 hours. Still following pending serologies. GI and hepatology following. Pending new labs per consult  recommendations..    Will continue to update plan as needed  Gavino Navarro RN,BSN

## 2024-01-05 NOTE — CONSULTS
Hepatology Note    PATIENT: Ivy Salgado  MRN: [id number]  DATE: 1/5/2024    Inpatient Provider: Hepatologist - Dr Easley      Diagnosis:   1. Crohn's disease of small intestine with other complication    2. Nausea    3. Chest pain    4. Elevated liver function tests        Chief complaint:   Chief Complaint   Patient presents with    Abdominal Pain     Has ileostomy. Crp was sl elevated, more stools       Subjective:    Initial History: Ivy Salgado is a 43 y.o. female who was referred to Hepatology for evaluation of transaminitis. Patient was admitted on Dec 31 for concern of flare of her Crohn's. At that time her LFTs were all WNL. On 1/2/24 the AST began trending upward and at time of hepatology consultation the AST/ALT were 66/88 respectively. Alk phos has also increased from normal to 267. Bili remains normal at 0.3. An MRCP was obtained showing  No evidence of obstruction and a 3mm cystic focus in the pancreatic tail probably communicating with the main pancreatic duct, likely a side branch IPMN.  Too small to characterize although no worrisome features.  Follow-up in 1 year is recommended.    She notes associated nausea without vomiting, chills, bloating, reflux and dyspepsia type symptoms, prompting her to resume a prn  PPI. She does not report a fever. 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      Prior Relevant History:    She  denies hepatotoxic medication, herbals, mushrooms.     Review of systems:  A review of 12+ systems was conducted with pertinent positive and negative findings documented in HPI with all other systems reviewed and negative.      PFSH:  Past medical, family, and social history reviewed as documented in chart with pertinent positive medical, family, and social history detailed in HPI.    Past Medical History:   Past Medical History:   Diagnosis Date    Abnormal Pap smear 2007    Abnormal Pap smear 5/26/2011    Anemia     Anxiety     Arthritis     C. difficile  diarrhea     Crohn's disease     Depression 8/5/2017    Encounter for blood transfusion     Genital HSV     History of colposcopy with cervical biopsy 2007 and 7/2011 2007-LYLA I  and 7/2011- LYLA I    Hypertension     Kidney stone     Kidney stone     Melanoma     Recurrent UTI 4/3/2013    S/P ileostomy 7/9/2012    Sterilization 6/23/2012       Past Surgical HIstory:   Past Surgical History:   Procedure Laterality Date    ABDOMINAL SURGERY      APPENDECTOMY      ARTHROPLASTY OF SHOULDER Left 7/18/2023    Procedure: ARTHROPLASTY, SHOULDER;  Surgeon: Sharon Babb MD;  Location: [facility] OR;  Service: Orthopedics;  Laterality: Left;    AUGMENTATION OF BREAST Bilateral 06/2022    gel implants    BILATERAL SALPINGO-OOPHORECTOMY (BSO) Bilateral 05/30/2019    Procedure: SALPINGO-OOPHORECTOMY, BILATERAL;  Surgeon: Rupa German MD;  Location: [facility] OR 2ND [facility];  Service: OB/GYN;  Laterality: Bilateral;    BLADDER SURGERY      partial cystectomy due to fistula    breast lift      BREAST SURGERY      [facility]      COLON SURGERY      COLONOSCOPY      CYSTOSCOPY  09/23/2020    Procedure: CYSTOSCOPY;  Surgeon: Sascha Florentino MD;  [address];  Service: Urology;;    CYSTOSCOPY W/ URETERAL STENT PLACEMENT Left 09/15/2020    Procedure: CYSTOSCOPY, WITH URETERAL STENT INSERTION;  Surgeon: Sascha Florentino MD;  [address];  Service: Urology;  Laterality: Left;    DIAGNOSTIC LAPAROSCOPY N/A 07/09/2020    Procedure: LAPAROSCOPY, DIAGNOSTIC;  Surgeon: Gilson El MD;  Location: [facility] OR;  Service: OB/GYN;  Laterality: N/A;    ESOPHAGOGASTRODUODENOSCOPY N/A 1/3/2024    Procedure: EGD (ESOPHAGOGASTRODUODENOSCOPY);  Surgeon: Lily Lind MD;  Location: [facility] ENDO (2ND FLR);  Service: Endoscopy;  Laterality: N/A;    EXCISION OF MELANOMA  07/17/2019    ILEOSCOPY N/A 1/3/2024    Procedure: ILEOSCOPY;  Surgeon: Lily Lind MD;  Location: [facility] ENDO (2ND FLR);  Service: Endoscopy;  Laterality:  N/A;    ILEOSTOMY      LAPAROSCOPIC LYSIS OF ADHESIONS N/A 07/09/2020    Procedure: LYSIS, ADHESIONS, LAPAROSCOPIC;  Surgeon: Gilson El MD;  Location: [facility] OR;  Service: OB/GYN;  Laterality: N/A;    LASER LITHOTRIPSY  09/23/2020    Procedure: LITHOTRIPSY, USING LASER;  Surgeon: Sascha Florentino MD;  Location: [facility] OR 1ST FLR;  Service: Urology;;    LYSIS OF ADHESIONS N/A 05/30/2019    Procedure: LYSIS, ADHESIONS;  Surgeon: Rupa German MD;  Location: [facility] OR 2ND FLR;  Service: OB/GYN;  Laterality: N/A;    OOPHORECTOMY Right 04/16/2015    PORTACATH PLACEMENT  02/21/2017    SKIN BIOPSY      SMALL INTESTINE SURGERY      age 18 Y    TOTAL ABDOMINAL HYSTERECTOMY  04/16/2015    TOTAL COLECTOMY      TUBAL LIGATION  06/06/2012    UPPER GASTROINTESTINAL ENDOSCOPY      URETEROSCOPIC REMOVAL OF URETERIC CALCULUS  09/23/2020    Procedure: REMOVAL, CALCULUS, URETER, URETEROSCOPIC;  Surgeon: Sascha Florentino MD;  [facility];  Service: Urology;;    URETEROSCOPY Left 09/23/2020    Procedure: URETEROSCOPY;  Surgeon: Sascha Florentino MD;  [facility];  Service: Urology;  Laterality: Left;       Family History:   Family History   Problem Relation Age of Onset    Diabetes Paternal Grandfather     Hearing loss Paternal Grandmother     Cancer Maternal Grandfather         Skin    Skin cancer Maternal Grandfather     Diabetes Maternal Grandfather     Heart disease Maternal Grandfather     Colon cancer Father     Cancer Father         Colon    Liver cancer Father     Hyperlipidemia Father     Hypertension Mother     Crohn's disease Brother     Endometrial cancer Maternal Aunt     Breast cancer Maternal Cousin 41    Crohn's disease Daughter     Ovarian cancer Neg Hx     Melanoma Neg Hx      She has known family history of liver disease in her father - HCC (vs. mets from colon cancer)    Social History:  reports that she has never smoked. She has never used smokeless tobacco. She reports  that she does not currently use alcohol. She reports that she does not use drugs.    She denies history of IV drug use/Tatto  She  denies high-risk sexual contacts, no raw seafood, no sick contacts, no herbals, no recent antibiotics or new Rx meds       Allergies:  Review of patient's allergies indicates:   Allergen Reactions    Azathioprine sodium Other (See Comments)     Other reaction(s): pancreatitis  Other reaction(s): pancreatitis    Methotrexate analogues Other (See Comments)     leukopenia    Stelara [ustekinumab] Other (See Comments)     Multiple infections    Zofran [ondansetron hcl (pf)] Other (See Comments)     Per patient causes prolong QT    Vancomycin analogues Other (See Comments)     Made her red    Azathioprine      Other reaction(s): Unknown    Methotrexate      Other reaction(s): infection-    Morphine Itching and Other (See Comments)     Other reaction(s): Itching    Zofran [ondansetron hcl]      Other reaction(s): Hives    Bactrim [sulfamethoxazole-trimethoprim] Palpitations    Ciprofloxacin Palpitations       Medications:  Current Facility-Administered Medications   Medication Dose Route Frequency Provider Last Rate Last Admin    albuterol-ipratropium 2.5 mg-0.5 mg/3 mL nebulizer solution 3 mL  3 mL Nebulization Q4H PRN Kezia Carrion PA-C        bisacodyL suppository 10 mg  10 mg Rectal Daily PRN Kezia Carrion PA-C        clonazePAM tablet 1 mg  1 mg Oral BID Kezia Carrion PA-C   1 mg at 01/04/24 2229    dextrose 10% bolus 125 mL 125 mL  12.5 g Intravenous PRN Kezia Carrion PA-C        dextrose 10% bolus 250 mL 250 mL  25 g Intravenous PRN Kezia Carrion PA-C        droNABinol capsule 5 mg  5 mg Oral BID AC Kezia Carrion PA-C   5 mg at 01/05/24 0446    enoxaparin injection 40 mg  40 mg Subcutaneous Daily Kezia Carrion PA-C   40 mg at 01/04/24 1814    ferrous sulfate tablet 1 each  1 tablet Oral Daily Candice Lee PA-C   1 each at  01/04/24 0924    gabapentin capsule 300 mg  300 mg Oral BID Kezia Carrion PA-C   300 mg at 01/04/24 2229    glucagon (human recombinant) injection 1 mg  1 mg Intramuscular PRN Kezia Carrion PA-C        glucose chewable tablet 16 g  16 g Oral PRN Kezia Carrion PA-C        glucose chewable tablet 24 g  24 g Oral PRN Kezia Carrion PA-C        HYDROmorphone injection 0.5 mg  0.5 mg Intravenous Q6H PRN Kezia Carrion PA-C   0.5 mg at 01/05/24 0548    mirtazapine disintegrating tablet 30 mg  30 mg Oral Nightly Kailash Grove MD   30 mg at 01/04/24 2228    morphine tablet 15 mg  15 mg Oral Q6H PRN Candice Lee PA-BRANNON   15 mg at 01/02/24 0801    morphine tablet 30 mg  30 mg Oral Q6H PRN Candice Lee PA-BRANNON   30 mg at 01/04/24 2228    multivitamin tablet  1 tablet Oral Daily Kezia Carrion PA-C   1 tablet at 01/04/24 0925    nadoloL tablet 40 mg  40 mg Oral Daily Candice Lee PA-BRANNON   40 mg at 01/04/24 0930    naloxone 0.4 mg/mL injection 0.4 mg  0.4 mg Intravenous PRN Kezia Carrion PA-C        pantoprazole EC tablet 40 mg  40 mg Oral BID Kezia Carrion PA-C   40 mg at 01/04/24 2229    polyethylene glycol packet 17 g  17 g Oral Daily PRN Kezia Carrion PA-C        prochlorperazine injection Soln 5 mg  5 mg Intravenous Q6H PRN Kezia Carrion PA-C   5 mg at 01/05/24 0549    promethazine tablet 25 mg  25 mg Oral Q6H PRN Kezia Carrion PA-C   25 mg at 12/31/23 2133    sodium chloride 0.9% flush 10 mL  10 mL Intravenous PRN Shreves, Shelby E., MD        zolpidem tablet 10 mg  10 mg Oral Nightly PRN Kezia Carrion PA-C           Objective:      Vitals:   Vitals:    01/05/24 0244 01/05/24 0455 01/05/24 0548 01/05/24 0752   BP:  106/72  (!) 102/59   BP Location:  Right arm     Patient Position:  Sitting     Pulse: 64 73  72   Resp:  16 18 16   Temp:    97 °F (36.1 °C)   TempSrc:       SpO2:  96%  97%   Weight:       Height:      "          Laboratory Data:  No results displayed because visit has over 200 results.          Lab Results   Component Value Date    INR 1.1 02/02/2018    INR 1.0 08/04/2017    INR 1.0 10/20/2016       No results found for: "SMOOTHMUSCAB", "MITOAB"  Lab Results   Component Value Date    IRON 26 (L) 01/02/2024    TIBC 364 01/02/2024    FERRITIN 26 01/02/2024     Lab Results   Component Value Date    HEPAIGM Non-reactive 09/19/2023    HEPBIGM Non-reactive 09/19/2023    HEPBCAB Non-reactive 01/02/2024    HEPCAB Non-reactive 09/19/2023     Lab Results   Component Value Date    TSH 2.386 01/02/2024     No results found for: "SAURAV"    No results found for: "ABORH"        Lab Results   Component Value Date    HGBA1C 5.1 01/01/2024     Lab Results   Component Value Date    CHOL 192 01/02/2024    CHOL 211 (H) 03/17/2022    CHOL 214 (H) 03/24/2021     Lab Results   Component Value Date    HDL 40 01/02/2024    HDL 58 03/17/2022    HDL 48 03/24/2021     Lab Results   Component Value Date    LDLCALC 122.0 01/02/2024    LDLCALC 102.0 03/17/2022    LDLCALC 123.8 03/24/2021     Lab Results   Component Value Date    TRIG 150 01/02/2024    TRIG 255 (H) 03/17/2022    TRIG 211 (H) 03/24/2021     Lab Results   Component Value Date    CHOLHDL 20.8 01/02/2024    CHOLHDL 27.5 03/17/2022    CHOLHDL 22.4 03/24/2021         I personally reviewed imaging studies and outside records..      Assessment:       Patient is a 44yo F with a PMH of Crohn's not currently on treatment admitted Jan 31 for concerns of a flare. 2 days after admission patient noted to have rising LFTs of unclear etiology. This trend has continued. AST/ALT were normal at admission but now 66/88 respectively. Alk phos has also increased from normal to 267. Bili remains normal at 0.3. An MRCP was obtained showing  No evidence of obstruction and a 3mm cystic focus in the pancreatic tail probably communicating with the main pancreatic duct, likely a side branch IPMN.     1. Crohn's " disease of small intestine with other complication    2. Nausea    3. Chest pain    4. Elevated liver function tests           Plan:   - Await results from pending serological workup- IgG, repeat HAV IgM and HCV, SAURAV/AMA/ASMA   - A1AT, ceruloplasmin, iron studies completed and reassuring  - no new medications, no alcohol, no tylenol in history  - no recent covid or other infections  - Obtain EBV, CMV and HSV   - MRCP completed and with one punctate tail cyst (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), no dilation     Dot Padilla MD  [facility] PGY5  #[id number]      Staff: Dr. Easley

## 2024-01-05 NOTE — PROGRESS NOTES
[facility] (Inflammatory Bowel Disease) Progress Note:    Reason for Consult: Crohn's disease    Brief History:  Ivy Salgado is a 43 y.o. 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 GI consulted for concerns for an IBD flare. The patient reports that she has been more or less doing well off all IBD therapies for the past 4 years and was last seen in clinic by her GI, Dr. Ross in 7/2023. At her baseline, she will have to change her bag 2-3 times per day with no night time awakenings. She started to notice over the course of the last 1-2 weeks that her output had started to increase and that the appearance of the output was more flaky. She denies any bloody output from her bag. She has noted that she will now have to change her bag 5-6 times in a 24/hr period and that 2-3 of those will be at night time- awakening her with either pain or bloating sensation prompting her to note her bag has refilled. She has not been taking any therapies except for Marinol (which is chronic home med for her) and Loperamide- max 2 mg BID. She notes that she periodically uses Imodium if she is going to go somewhere to control her output, but over the last week it has not been prn, but more a regular 2 mg BID dosing for her. Despite this, her output has still been  increased. She notes associated nausea without vomiting, chills, bloating, reflux and dyspepsia type symptoms, prompting her to resume a prn  PPI. She does not report a fever. 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     Hospital course  - elevated LFTs workup in progress  - ileoscopy through stoma with mild recurrent ileitis with plans to restart entyvio as outpatient with induction followed by maintenance therapy    Interval History:  - changed ostomy x 6 times in last 24 hours  - LFTs trending down but still elevated ALK Phos and AST/ALT decreased TBili normal  - having significant R sided abdominal pain     IBD History      Current IBD Meds: none  Current GI Meds: none    Review of Systems   Constitutional:  Positive for malaise/fatigue. Negative for chills, fever and weight loss.   HENT:          No oral ulcers, dysphagia, oral thrush   Eyes:  Negative for blurred vision, pain and redness.   Respiratory:  Negative for cough and shortness of breath.    Cardiovascular:  Negative for chest pain.   Gastrointestinal:  Positive for abdominal pain, diarrhea and nausea. Negative for blood in stool, heartburn and vomiting.   Genitourinary:  Negative for dysuria and hematuria.   Musculoskeletal:  Negative for back pain and joint pain.   Skin:  Negative for rash.   Psychiatric/Behavioral:  Negative for depression. The patient is not nervous/anxious and does not have insomnia.          Prior Pertinent Surgeries:   1992- Unclear, colon resection to address fistulous disease process  1998- Unclear, colon resection to address abscess   2012- S/p total proctocolectomy with end ileostomy, post-op course complicated by perineal wound which she followed with [facility] for ~ 1 year (Dr. 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  The ileostomy appears patent there is scattered interloop fluid and venous vascular engorgement involving the distended bowel loops, no findings to suggest abscess.     2021 MRE-no active inflammation.     2017- Ileoscopy- The examined portion of the ileum was normal.   Biopsied. Widely patent end ileostomy with healthy appearing mucosa at the ileostomy. Pathology showed fragments of unremarkable small bowel mucosa. No evidence of active colitis, granuloma, dysplasia or malignancy  10/2016- EGD- Normal esophagus. Small hiatus hernia. Erythematous mucosa in the gastric body. Biopsied. Trace of hematin in the gastric body.   Normal examined duodenum.  Two biopsies were obtained in the duodenal bulb. Four biopsies were obtained in the 2nd part of the duodenum.   10/2016- Ileoscopy- The examined portion of the ileum was normal. Biopsies without any acute abnormalities- CMV, HSV-1 AND HSV-2 stains performed. No celiac. No. H pylori. Normal mucosa of the small bowel.   10/2014- EGD- Normal Study. Biopsied to r/o Crohn's.    10/2014- Ileoscopy- The examined portion of the ileum was normal. Biopsies normal.  2014- SBE- A single (solitary) ulcer in the proximal ileum. Biopsied. Mild non-specific enteritis.   2013- Ileoscopy- Congested, eroded and ulcerated mucosa in the area at 5 cm proximal to the stoma. Biopsied. The examined portion of the ileum was normal otherwise up to 30 cm. Biopsied. FRAGMENTS OF NONNEOPLASTIC SMALL INTESTINAL MUCOSA WITH NO ACTIVE INFLAMMATION, ULCERATION OR DYSPLASIA PRESENT. THERE IS PRESERVATION OF THE VILLOUS ARCHITECTURE. FRAGMENTS OF NONNEOPLASTIC SMALL INTESTINAL MUCOSA WITH NO ACTIVE INFLAMMATION, ULCERATION OR DYSPLASIA  IDENTIFIED.  9/2013- EGD- Normal esophagus. Z-line regular, 36 cm from the incisors. A single gastric polyp. Resected and retrieved. A small amount of food (residue) in the stomach. Removal was successful. Mucosal abnormality in the duodenum. Biopsied. Mild chronic gastritis but otherwise negative.   5/2012- EGD- Normal Study.   5/2012- Colonoscopy- Crohn's colitis mostly involving the distal 30 cm. This was biopsied to r/o CMV, HSV. Mild ileocolonic anastomosis Crohn's disease. Chronic active colitis with surface ulceration, cryptitis, crypt abscess and reactive changes. Negative for CMV.     Therapeutic Drug Monitoring Labs:    Prior IBD Therapies:  Entyvio/Vedolizumab- most recent therapy, was on this from [id number] and then stopped as she was getting recurrent UTI's/pyelonephritis   Stelara/Ustekinumab- was on this prior to Entyvio and had multiple infections   Cimzia/Certolizumab- stopped working after some years, unclear on dates, records note she was on this in 2016   Remicade/Inflixmab- stopped working after many years, unsure about antibodies   Humira/Adalimumab- Suspected drug-induced lupus due to joint pains  Prednisone tapers- effective, caused avascular necrosis to her left shoulder requiring arthroplasty   Imuran/Azathioprine- stopped due to drug-induced pancreatitis   Methotrexate- stopped due to significant leukopenia  Sulfasalazine at some point prior to 2012  Entocort- at some point prior to 2012   Canasa- at some point prior to 2012    Inpatient Medications:     clonazePAM  1 mg Oral BID    droNABinol  5 mg Oral BID AC    enoxparin  40 mg Subcutaneous Daily    ferrous sulfate  1 tablet Oral Daily    gabapentin  300 mg Oral BID    mirtazapine  30 mg Oral Nightly    multivitamin  1 tablet Oral Daily    nadoloL  40 mg Oral Daily    pantoprazole  40 mg Oral BID       Vital Signs:  BP (!) 102/59 (BP Location: Right arm, Patient Position: Sitting)   Pulse 72   Temp 97 °F (36.1 °C)   Resp 18   Ht 5'  "1" (1.549 m)   Wt 50.9 kg (112 lb 3.2 oz)   LMP 03/30/2015   SpO2 97%   Breastfeeding No   BMI 21.20 kg/m²      Physical Exam  Vitals and nursing note reviewed.   Constitutional:       General: She is not in acute distress.     Appearance: She is not ill-appearing.   Cardiovascular:      Rate and Rhythm: Normal rate and regular rhythm.      Pulses: Normal pulses.   Pulmonary:      Effort: Pulmonary effort is normal. No respiratory distress.   Abdominal:      General: The ostomy site is clean. Bowel sounds are increased. There is no distension.      Palpations: Abdomen is soft.      Tenderness: There is abdominal tenderness in the right upper quadrant and right lower quadrant.   Skin:     General: Skin is warm and dry.      Capillary Refill: Capillary refill takes 2 to 3 seconds.      Coloration: Skin is not jaundiced.   Neurological:      Mental Status: She is alert and oriented to person, place, and time.         Labs:   Lab Results   Component Value Date    WBC 5.26 01/05/2024    HGB 11.2 (L) 01/05/2024    HCT 34.9 (L) 01/05/2024    MCV 92 01/05/2024     01/05/2024     Lab Results   Component Value Date    CREATININE 0.7 01/05/2024    ALBUMIN 2.8 (L) 01/05/2024    BILITOT 0.3 01/05/2024    ALKPHOS 267 (H) 01/05/2024    AST 66 (H) 01/05/2024    ALT 88 (H) 01/05/2024     Lab Results   Component Value Date    CRP 33.2 (H) 01/02/2024    CALPROTECTIN <27.1 12/06/2021     Lab Results   Component Value Date    HEPBSAG Non-reactive 09/19/2023    HEPBCAB Non-reactive 01/02/2024     Lab Results   Component Value Date    TBGOLDPLUS Negative 01/02/2024     Lab Results   Component Value Date    RQEBDYHW92KV 21 (L) 06/13/2019    PEFKSHQC78 278 01/02/2024       Microbiology Results (last 7 days)       Procedure Component Value Units Date/Time    Blood culture #2 **CANNOT BE ORDERED STAT** [[id number]] Collected: 12/31/23 3298    Order Status: Completed Specimen: Blood from Line, Port A Cath Updated: 01/04/24 2212     " Blood Culture, Routine No Growth to date      No Growth to date      No Growth to date      No Growth to date      No Growth to date    Blood culture #1 **CANNOT BE ORDERED STAT** [[id number]] Collected: 12/31/23 1924    Order Status: Completed Specimen: Blood from Peripheral, Antecubital, Right Updated: 01/04/24 2212     Blood Culture, Routine No Growth to date      No Growth to date      No Growth to date      No Growth to date      No Growth to date    Stool culture **CANNOT BE ORDERED STAT** [[id number]] Collected: 12/31/23 2122    Order Status: Completed Specimen: Stool Updated: 01/03/24 1028     Stool Culture No Salmonella,Shigella,Vibrio,Campylobacter,Yersinia isolated.    E. coli 0157 antigen [[id number]] Collected: 12/31/23 2122    Order Status: Completed Specimen: Stool Updated: 01/02/24 0731     Shiga Toxin 1 E.coli Negative     Shiga Toxin 2 E.coli Negative    Clostridium difficile EIA [[id number]] Collected: 12/31/23 2122    Order Status: Completed Specimen: Stool Updated: 01/01/24 1204     C. diff Antigen Negative     C difficile Toxins A+B, EIA Negative     Comment: Testing not recommended for children <24 months old.                Impression:  Ivy Salgado is a 43 y.o. female  with ARPITA/Depression, GERD, Long QTc syndrome (Type III, on nadolol), s/p SHELLIE (2015) for recurrent ovarian cysts and endometriosis, history of melanoma in situ (buttocks and para-stomal site, excised in 2018 and 2019 respectively) and Crohn's disease with small and large intestine involvement (diagnosed in 1990; terminal ileum involvement per patient) s/p total proctocolectomy with end ileostomy (6/2012) off all therapies since 2019 that presents to [facility] [place] on 12/31 for evaluation of intermittent generalized abdominal pain (more concentrated in the RUQ and RLQ), increased ostomy output, poor po intake and nausea without vomiting for the last week. GI consulted for concerns for an IBD flare.      This morning patient reports  having to change her ostomy 6x in the last 24 hours. She continues to report she is not eating and drinking. Her LFTs trended down this AM. MRCP did not show ductal dilation but did show a 3 mm pancreatic cyst which will need follow up in pancreatic cyst clinic after discharge.    We have discussed restarting Entyvio with her to treat her recurrent crohn's seen on ileoscopy but need to have her LFTs trending down and liver work up completed. She was agreeable to this and to transitioning care to IBD clinic from Dr. Ross.     Recurrent Crohn's Disease  Elevated LFTs  Pancreatic cyst (3 mm)    Plan:  - Recommend outpatient referral to [facility] pancreatic cyst clinic on discharge  - Follow up hepatology recs - work up reassuring thus far per hepatology  - Follow up right sided pain - hoping it improves with LFTs downtrending  - Follow up stool calprotectin  - Restart Entyvio outpatient once discharged  - Avoid NSAIDs since this may exacerbate IBD  - DVT prophylaxis (IBD pts increased risk of VTE)   - Opiates- if necessary, IV morphine is preferred due to short acting nature      Shelby Zhong NP   Department of Gastroenterology  Inflammatory Bowel Disease

## 2024-01-06 LAB
ALBUMIN SERPL BCP-MCNC: 2.7 G/DL (ref 3.5–5.2)
ALP SERPL-CCNC: 245 U/L (ref 55–135)
ALT SERPL W/O P-5'-P-CCNC: 67 U/L (ref 10–44)
ANION GAP SERPL CALC-SCNC: 9 MMOL/L (ref 8–16)
AST SERPL-CCNC: 42 U/L (ref 10–40)
BILIRUB SERPL-MCNC: 0.2 MG/DL (ref 0.1–1)
BUN SERPL-MCNC: 8 MG/DL (ref 6–20)
CALCIUM SERPL-MCNC: 9 MG/DL (ref 8.7–10.5)
CHLORIDE SERPL-SCNC: 105 MMOL/L (ref 95–110)
CO2 SERPL-SCNC: 29 MMOL/L (ref 23–29)
CREAT SERPL-MCNC: 0.7 MG/DL (ref 0.5–1.4)
EST. GFR  (NO RACE VARIABLE): >60 ML/MIN/1.73 M^2
GLUCOSE SERPL-MCNC: 111 MG/DL (ref 70–110)
POTASSIUM SERPL-SCNC: 3.8 MMOL/L (ref 3.5–5.1)
PROT SERPL-MCNC: 6.5 G/DL (ref 6–8.4)
SODIUM SERPL-SCNC: 143 MMOL/L (ref 136–145)

## 2024-01-06 PROCEDURE — 86381 MITOCHONDRIAL ANTIBODY EACH: CPT

## 2024-01-06 PROCEDURE — 25000003 PHARM REV CODE 250

## 2024-01-06 PROCEDURE — 86015 ACTIN ANTIBODY EACH: CPT

## 2024-01-06 PROCEDURE — 86038 ANTINUCLEAR ANTIBODIES: CPT

## 2024-01-06 PROCEDURE — 85025 COMPLETE CBC W/AUTO DIFF WBC: CPT

## 2024-01-06 PROCEDURE — 25000003 PHARM REV CODE 250: Performed by: FAMILY MEDICINE

## 2024-01-06 PROCEDURE — 63600175 PHARM REV CODE 636 W HCPCS

## 2024-01-06 PROCEDURE — 25000003 PHARM REV CODE 250: Performed by: PHYSICIAN ASSISTANT

## 2024-01-06 PROCEDURE — 63600175 PHARM REV CODE 636 W HCPCS: Performed by: PHYSICIAN ASSISTANT

## 2024-01-06 PROCEDURE — 21400001 HC TELEMETRY ROOM

## 2024-01-06 PROCEDURE — 80053 COMPREHEN METABOLIC PANEL: CPT | Performed by: HOSPITALIST

## 2024-01-06 PROCEDURE — 85610 PROTHROMBIN TIME: CPT

## 2024-01-06 RX ORDER — LOPERAMIDE HYDROCHLORIDE 2 MG/1
2 CAPSULE ORAL 4 TIMES DAILY
Status: DISCONTINUED | OUTPATIENT
Start: 2024-01-06 | End: 2024-01-07

## 2024-01-06 RX ORDER — SODIUM CHLORIDE, SODIUM LACTATE, POTASSIUM CHLORIDE, CALCIUM CHLORIDE 600; 310; 30; 20 MG/100ML; MG/100ML; MG/100ML; MG/100ML
INJECTION, SOLUTION INTRAVENOUS CONTINUOUS
Status: ACTIVE | OUTPATIENT
Start: 2024-01-06 | End: 2024-01-06

## 2024-01-06 RX ORDER — LOPERAMIDE HYDROCHLORIDE 2 MG/1
2 CAPSULE ORAL 3 TIMES DAILY
Status: DISCONTINUED | OUTPATIENT
Start: 2024-01-06 | End: 2024-01-06

## 2024-01-06 RX ADMIN — LOPERAMIDE HYDROCHLORIDE 2 MG: 2 CAPSULE ORAL at 12:01

## 2024-01-06 RX ADMIN — CLONAZEPAM 1 MG: 0.5 TABLET ORAL at 08:01

## 2024-01-06 RX ADMIN — DRONABINOL 5 MG: 2.5 CAPSULE ORAL at 06:01

## 2024-01-06 RX ADMIN — LOPERAMIDE HYDROCHLORIDE 2 MG: 2 CAPSULE ORAL at 08:01

## 2024-01-06 RX ADMIN — THERA TABS 1 TABLET: TAB at 08:01

## 2024-01-06 RX ADMIN — HYDROMORPHONE HYDROCHLORIDE 0.5 MG: 1 INJECTION, SOLUTION INTRAMUSCULAR; INTRAVENOUS; SUBCUTANEOUS at 01:01

## 2024-01-06 RX ADMIN — PROCHLORPERAZINE EDISYLATE 5 MG: 5 INJECTION INTRAMUSCULAR; INTRAVENOUS at 05:01

## 2024-01-06 RX ADMIN — GABAPENTIN 300 MG: 300 CAPSULE ORAL at 08:01

## 2024-01-06 RX ADMIN — MIRTAZAPINE 30 MG: 15 TABLET, ORALLY DISINTEGRATING ORAL at 08:01

## 2024-01-06 RX ADMIN — MORPHINE SULFATE 30 MG: 15 TABLET ORAL at 04:01

## 2024-01-06 RX ADMIN — ENOXAPARIN SODIUM 40 MG: 40 INJECTION SUBCUTANEOUS at 05:01

## 2024-01-06 RX ADMIN — PANTOPRAZOLE SODIUM 40 MG: 40 TABLET, DELAYED RELEASE ORAL at 08:01

## 2024-01-06 RX ADMIN — FERROUS SULFATE TAB EC 325 MG (65 MG FE EQUIVALENT) 1 EACH: 325 (65 FE) TABLET DELAYED RESPONSE at 08:01

## 2024-01-06 RX ADMIN — SODIUM CHLORIDE, POTASSIUM CHLORIDE, SODIUM LACTATE AND CALCIUM CHLORIDE: 600; 310; 30; 20 INJECTION, SOLUTION INTRAVENOUS at 08:01

## 2024-01-06 RX ADMIN — ALTEPLASE 2 MG: 2.2 INJECTION, POWDER, LYOPHILIZED, FOR SOLUTION INTRAVENOUS at 10:01

## 2024-01-06 RX ADMIN — HYDROMORPHONE HYDROCHLORIDE 0.5 MG: 1 INJECTION, SOLUTION INTRAMUSCULAR; INTRAVENOUS; SUBCUTANEOUS at 08:01

## 2024-01-06 RX ADMIN — PROCHLORPERAZINE EDISYLATE 5 MG: 5 INJECTION INTRAMUSCULAR; INTRAVENOUS at 12:01

## 2024-01-06 RX ADMIN — PROCHLORPERAZINE EDISYLATE 5 MG: 5 INJECTION INTRAMUSCULAR; INTRAVENOUS at 08:01

## 2024-01-06 RX ADMIN — DRONABINOL 5 MG: 2.5 CAPSULE ORAL at 04:01

## 2024-01-06 RX ADMIN — LOPERAMIDE HYDROCHLORIDE 2 MG: 2 CAPSULE ORAL at 04:01

## 2024-01-06 RX ADMIN — MORPHINE SULFATE 15 MG: 15 TABLET ORAL at 08:01

## 2024-01-06 NOTE — TREATMENT PLAN
Hepatology Treatment Plan    Ivy Salgado is a 43 y.o. female admitted to hospital 12/31/2023 (Hospital Day: 7) due to Crohn's disease of small intestine with complication.     Interval History  VSS this AM. Afebrile.     >42, >67.    Serological workup so far unrevealing.     Objective  Temp:  [96.3 °F (35.7 °C)-98 °F (36.7 °C)] 97.9 °F (36.6 °C) (01/06 0758)  Pulse:  [64-83] 71 (01/06 0758)  BP: ()/(52-73) 107/64 (01/06 0758)  Resp:  [17-18] 17 (01/06 0858)  SpO2:  [97 %-100 %] 100 % (01/06 0758)        Laboratory    Lab Results   Component Value Date    WBC 5.26 01/05/2024    HGB 11.2 (L) 01/05/2024    HCT 34.9 (L) 01/05/2024    MCV 92 01/05/2024     01/05/2024       Lab Results   Component Value Date     01/06/2024    K 3.8 01/06/2024     01/06/2024    CO2 29 01/06/2024    BUN 8 01/06/2024    CREATININE 0.7 01/06/2024    CALCIUM 9.0 01/06/2024       Lab Results   Component Value Date    ALBUMIN 2.7 (L) 01/06/2024    ALT 67 (H) 01/06/2024    AST 42 (H) 01/06/2024    ALKPHOS 245 (H) 01/06/2024    BILITOT 0.2 01/06/2024       Lab Results   Component Value Date    INR 1.1 02/02/2018    INR 1.0 08/04/2017    INR 1.0 10/20/2016         Assessment  Patient is a 42yo F with a PMH of Crohn's not currently on treatment admitted Jan 31 for concerns of a flare. 2 days after admission patient noted to have rising LFTs of unclear etiology. This trend has continued. AST/ALT were normal at admission but now 66/88 respectively. Alk phos has also increased from normal to 267. Bili remains normal at 0.3. An MRCP was obtained showing  No evidence of obstruction and a 3mm cystic focus in the pancreatic tail probably communicating with the main pancreatic duct, likely a side branch IPMN.      1. Crohn's disease of small intestine with other complication   2. Nausea   3. Chest pain   4. Elevated liver function tests   5.         IPMN        Pertinent labs/imaging:  Alpha-1-antitrypsin:  [Father] : father 192  Ceruloplasmin: 40  Ferritin: 26  SAURAV: Negative   AMA: Negative   ASMA: Negative    US liver with Doppler: Satisfactory Doppler evaluation of the liver. Mildly dilated bile ducts in the central right hepatic lobe.  Prominence of the common bile duct is better evaluated on CT 12/31/2023.     MRI abdomen with & without contrast: No evidence of biliary obstruction. Punctate cystic focus in the pancreatic tail probably communicating with the main pancreatic duct, likely a side branch IPMN.  Too small to characterize although no worrisome features.  Follow-up in 1 year is recommended.    Plan  - Will arrange hepatology clinic f/up. No indication for liver biopsy at this time as LFTs are declining.   -  Will follow IgG, EBV DNA PCR, HSV DNA PCR & CMV DNA PCR.   - please obtain daily CBC, CMP, INR  - Plan of care was discussed with primary team    Thank you for involving us in the care of Ivy Salgado. Please call with any additional concerns or questions. Will follow peripherally.    Connor Hendrickson MD, PGY-V  Gastroenterology Fellow  [facility]

## 2024-01-06 NOTE — ASSESSMENT & PLAN NOTE
- 43 y.o. F with history of crohn's previously in remission presenting with severe and uncontrolled pain, increased ileostomy output and frequency without evidence of infectious diarrhea. Ileoscopy revealing multiple ulcerations in IBD that was previously in remission. Per GI, biopsies are needed for definitive dx before more aggressive treatment with immunosuppressants are indicated.  - presentation consistent with Crohn's flare  - afebrile without leukocytosis  - inflammatory makers elevated and up-trending  - stool studies negative  - CT abd/pelvis shows inflammatory enteritis without abscess or obstruction   - EGD with erythematous mucosa in the prepyloric region of the stomach. Ileoscopy revealed ulcers in the distal ileum. Biopsies pending.   - Abdominal U/S revealed mildly dilated bile ducts in the central right hepatic lobe - see transaminitis   - GI consulted, appreciate recs:   - Recommend outpatient referral to [facility] pancreatic cyst clinic on discharge  - Follow up stool calprotectin  - Restart Entyvio outpatient once discharged  - Avoid NSAIDs since this may exacerbate IBD  - DVT prophylaxis (IBD pts increased risk of VTE)   - Opiates- if necessary, IV morphine is preferred due to short acting nature  - Bcx NGTD   - pain and nausea control  - pt with soft pressures - improved following gentle IVFs  - scheduled imodium to help with stool burden

## 2024-01-06 NOTE — ASSESSMENT & PLAN NOTE
Improving  - AST 52 -> 79 -> 179 -> 66 -> 42  - Alk Phos 170 -> 245 -> 309 -> 267 ->245  - ALT 44 -> 130 -> 88 -> 67  - Abdominal U/S revealed mildly dilated bile ducts in the central right hepatic lobe  - NPO for MRCP this am  - GI consulted and recommend formal hepatology consult, recommendations:   - Will arrange hepatology clinic f/up. No indication for liver biopsy at this time as LFTs are declining.   -  Will follow IgG, EBV DNA PCR, HSV DNA PCR & CMV DNA PCR.   - please obtain daily CBC, CMP, INR

## 2024-01-06 NOTE — SUBJECTIVE & OBJECTIVE
Interval History: Patient seen and assessed at bedside. Afebrile, slightly hypotensive, otherwise VSS. Given gentle IVFs yesterday evening and this morning with improvement. Patient still with r-sided abdominal pain. Rates it about a 7/10 on assessment. Still with significant watery output from ostomy - reports having to change it x10 in last 24 hours. Starting scheduled imodium to help with stool burden. Hepatology and GI following serologies.       Review of Systems   Constitutional:  Positive for appetite change. Negative for chills, fatigue and fever.   HENT:  Negative for trouble swallowing and voice change.    Eyes:  Negative for photophobia and visual disturbance.   Respiratory:  Negative for cough, chest tightness, shortness of breath and wheezing.    Cardiovascular:  Negative for chest pain, palpitations and leg swelling.   Gastrointestinal:  Positive for abdominal pain, diarrhea and nausea. Negative for blood in stool, constipation and vomiting.   Genitourinary:  Negative for difficulty urinating, dysuria, frequency, hematuria and urgency.   Musculoskeletal:  Negative for arthralgias, back pain and gait problem.   Skin:  Negative for color change and rash.   Neurological:  Negative for dizziness, seizures, speech difficulty, weakness, light-headedness and headaches.   Psychiatric/Behavioral:  Negative for behavioral problems, confusion and decreased concentration.      Objective:     Vital Signs (Most Recent):  Temp: 96.8 °F (36 °C) (01/06/24 1210)  Pulse: 74 (01/06/24 1210)  Resp: 17 (01/06/24 1314)  BP: 108/67 (01/06/24 1210)  SpO2: 97 % (01/06/24 1210) Vital Signs (24h Range):  Temp:  [96.4 °F (35.8 °C)-98 °F (36.7 °C)] 96.8 °F (36 °C)  Pulse:  [58-82] 74  Resp:  [17-18] 17  SpO2:  [97 %-100 %] 97 %  BP: ()/(52-67) 108/67     Weight: 50.9 kg (112 lb 3.2 oz)  Body mass index is 21.2 kg/m².    Intake/Output Summary (Last 24 hours) at 1/6/2024 0172  Last data filed at 1/6/2024 0307  Gross per 24 hour    Intake 1840 ml   Output --   Net 1840 ml         Physical Exam  Vitals and nursing note reviewed.   Constitutional:       General: She is not in acute distress.     Appearance: She is well-developed.   HENT:      Head: Normocephalic and atraumatic.   Eyes:      Extraocular Movements: Extraocular movements intact.      Conjunctiva/sclera: Conjunctivae normal.   Cardiovascular:      Rate and Rhythm: Normal rate and regular rhythm.      Heart sounds: Normal heart sounds.   Pulmonary:      Effort: Pulmonary effort is normal. No respiratory distress.      Breath sounds: Normal breath sounds. No wheezing.   Abdominal:      General: Bowel sounds are normal. There is no distension.      Palpations: Abdomen is soft.      Tenderness: There is abdominal tenderness (R-sided).      Comments: Ileostomy in place without surrounding erythema or drainage   Musculoskeletal:         General: No tenderness. Normal range of motion.      Cervical back: Normal range of motion and neck supple.   Skin:     General: Skin is warm and dry.      Capillary Refill: Capillary refill takes less than 2 seconds.      Findings: No rash.   Neurological:      Mental Status: She is alert and oriented to person, place, and time.      Cranial Nerves: No cranial nerve deficit.      Sensory: No sensory deficit.      Coordination: Coordination normal.   Psychiatric:         Behavior: Behavior normal.         Thought Content: Thought content normal.         Judgment: Judgment normal.             Significant Labs: All pertinent labs within the past 24 hours have been reviewed.    Significant Imaging: I have reviewed all pertinent imaging results/findings within the past 24 hours.

## 2024-01-06 NOTE — PROGRESS NOTES
[facility] [address] [place] [address] Medicine  Progress Note    Patient Name: Ivy Salgado  MRN: [id number]  Patient Class: IP- Inpatient   Admission Date: 12/31/2023  Length of Stay: 4 days  Attending Physician: Rosita Mendez MD  Primary Care Provider: Luis Madden DO        Subjective:     Principal Problem:Crohn's disease of small intestine with complication        HPI:  Ivy Salgado is a 43 y.o. female with a PMHx of ARPITA, Crohn's s/p ileostomy on Entyvio who presents to [facility] [place] for [place] of abdominal pain. Patient reprots intermittnet severe generalized abdominal pain for the past 4 days. Pain is worse on her right side and occasionally radiates to her flank. Endorses associated increased ostomy output, poor appetite, and nausea without any vomiting. She feels flushed and had increased temp at home but no fever (reported Tmax 100.2). Symptoms are consistent with prior Crohn's flares, has not had a flare since 2020.  Denies dark/bloody stools, HA, vision changes, chest pain, SOB, LE swelling or syncope.    ED: AFVSS. No leukocytosis or electrolyte abnormalities. ESR 94, CRP 22.8. UA noninfectious. CT abd/pelvis shows slow flow through a few mid to distal small bowel loops noting venous vascular engorgement about these loops and wall hyperemia concerning for inflammatory enteritis. ED provider spoke with GI who recommend holding antibiotics for now and NPO at midnight for potential scope in AM. Given IV dilaudid 1mg x2 and IV compazine.     Overview/Hospital Course:  Ms. Salgado was admitted to hospital medicine for inflammatory enteritis and suspected acute crohn's flare. C. Diff negative, stool culture without significant growth to date. GI consulted and performed EGD and ileoscopy. EGD with erythematous mucosa in the prepyloric region of the stomach. Ileoscopy revealed ulcers in the distal ileum. Biopsies pending. Abdominal U/S revealed mildly dilated bile ducts in the central right hepatic lobe and  the patient has uptrending transaminitis without elevated T reji. 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 Hepatology consulted: awaiting serology results. Pt with soft pressures - giving gentle IVFs. Will discharge patient when medically ready.     Interval History: Patient seen and assessed at bedside. Afebrile, slightly hypotensive, otherwise VSS. Given gentle IVFs yesterday evening and this morning with improvement. Patient still with r-sided abdominal pain. Rates it about a 7/10 on assessment. Still with significant watery output from ostomy - reports having to change it x10 in last 24 hours. Starting scheduled imodium to help with stool burden. Hepatology and GI following serologies.       Review of Systems   Constitutional:  Positive for appetite change. Negative for chills, fatigue and fever.   HENT:  Negative for trouble swallowing and voice change.    Eyes:  Negative for photophobia and visual disturbance.   Respiratory:  Negative for cough, chest tightness, shortness of breath and wheezing.    Cardiovascular:  Negative for chest pain, palpitations and leg swelling.   Gastrointestinal:  Positive for abdominal pain, diarrhea and nausea. Negative for blood in stool, constipation and vomiting.   Genitourinary:  Negative for difficulty urinating, dysuria, frequency, hematuria and urgency.   Musculoskeletal:  Negative for arthralgias, back pain and gait problem.   Skin:  Negative for color change and rash.   Neurological:  Negative for dizziness, seizures, speech difficulty, weakness, light-headedness and headaches.   Psychiatric/Behavioral:  Negative for behavioral problems, confusion and decreased concentration.      Objective:     Vital Signs (Most Recent):  Temp: 96.8 °F (36 °C) (01/06/24 1210)  Pulse: 74 (01/06/24 1210)  Resp: 17 (01/06/24 1314)  BP: 108/67 (01/06/24 1210)  SpO2: 97 % (01/06/24 1210)  Vital Signs (24h Range):  Temp:  [96.4 °F (35.8 °C)-98 °F (36.7 °C)] 96.8 °F (36 °C)  Pulse:  [58-82] 74  Resp:  [17-18] 17  SpO2:  [97 %-100 %] 97 %  BP: ()/(52-67) 108/67     Weight: 50.9 kg (112 lb 3.2 oz)  Body mass index is 21.2 kg/m².    Intake/Output Summary (Last 24 hours) at 1/6/2024 1518  Last data filed at 1/6/2024 0307  Gross per 24 hour   Intake 1840 ml   Output --   Net 1840 ml         Physical Exam  Vitals and nursing note reviewed.   Constitutional:       General: She is not in acute distress.     Appearance: She is well-developed.   HENT:      Head: Normocephalic and atraumatic.   Eyes:      Extraocular Movements: Extraocular movements intact.      Conjunctiva/sclera: Conjunctivae normal.   Cardiovascular:      Rate and Rhythm: Normal rate and regular rhythm.      Heart sounds: Normal heart sounds.   Pulmonary:      Effort: Pulmonary effort is normal. No respiratory distress.      Breath sounds: Normal breath sounds. No wheezing.   Abdominal:      General: Bowel sounds are normal. There is no distension.      Palpations: Abdomen is soft.      Tenderness: There is abdominal tenderness (R-sided).      Comments: Ileostomy in place without surrounding erythema or drainage   Musculoskeletal:         General: No tenderness. Normal range of motion.      Cervical back: Normal range of motion and neck supple.   Skin:     General: Skin is warm and dry.      Capillary Refill: Capillary refill takes less than 2 seconds.      Findings: No rash.   Neurological:      Mental Status: She is alert and oriented to person, place, and time.      Cranial Nerves: No cranial nerve deficit.      Sensory: No sensory deficit.      Coordination: Coordination normal.   Psychiatric:         Behavior: Behavior normal.         Thought Content: Thought content normal.         Judgment: Judgment normal.             Significant Labs: All pertinent labs within the past 24 hours have been reviewed.    Significant Imaging: I have  reviewed all pertinent imaging results/findings within the past 24 hours.    Assessment/Plan:      * Crohn's disease of small intestine with complication  - 43 y.o. F with history of crohn's previously in remission presenting with severe and uncontrolled pain, increased ileostomy output and frequency without evidence of infectious diarrhea. Ileoscopy revealing multiple ulcerations in IBD that was previously in remission. Per GI, biopsies are needed for definitive dx before more aggressive treatment with immunosuppressants are indicated.  - presentation consistent with Crohn's flare  - afebrile without leukocytosis  - inflammatory makers elevated and up-trending  - stool studies negative  - CT abd/pelvis shows inflammatory enteritis without abscess or obstruction   - EGD with erythematous mucosa in the prepyloric region of the stomach. Ileoscopy revealed ulcers in the distal ileum. Biopsies pending.   - Abdominal U/S revealed mildly dilated bile ducts in the central right hepatic lobe - see transaminitis   - GI consulted, appreciate recs:   - Recommend outpatient referral to [facility] pancreatic cyst clinic on discharge  - Follow up stool calprotectin  - Restart Entyvio outpatient once discharged  - Avoid NSAIDs since this may exacerbate IBD  - DVT prophylaxis (IBD pts increased risk of VTE)   - Opiates- if necessary, IV morphine is preferred due to short acting nature  - Bcx NGTD   - pain and nausea control  - pt with soft pressures - improved following gentle IVFs  - scheduled imodium to help with stool burden    Transaminitis  Improving  - AST 52 -> 79 -> 179 -> 66 -> 42  - Alk Phos 170 -> 245 -> 309 -> 267 ->245  - ALT 44 -> 130 -> 88 -> 67  - Abdominal U/S revealed mildly dilated bile ducts in the central right hepatic lobe  - NPO for MRCP this am  - GI consulted and recommend formal hepatology consult, recommendations:   - Will arrange hepatology clinic f/up. No indication for liver biopsy at this time as LFTs are  declining.   -  Will follow IgG, EBV DNA PCR, HSV DNA PCR & CMV DNA PCR.   - please obtain daily CBC, CMP, INR    Generalized anxiety disorder  - continue klonopin, remeron, and nadolol    S/P ileostomy  - routine ostomy care      VTE Risk Mitigation (From admission, onward)           Ordered     enoxaparin injection 40 mg  Daily         12/31/23 1912     IP VTE LOW RISK PATIENT  Once         12/31/23 1912     Place sequential compression device  Until discontinued         12/31/23 1912                    Discharge Planning   CHRISTOPH: 1/6/2024     Code Status: Full Code   Is the patient medically ready for discharge?: No    Reason for patient still in hospital (select all that apply): Patient trending condition, Treatment, Consult recommendations, and Pending disposition  Discharge Plan A: Home   Discharge Delays: None known at this time              Naomy Gary PA-C  Department of Hospital Medicine   [facility]

## 2024-01-06 NOTE — PLAN OF CARE
Problem: Infection  Goal: Absence of Infection Signs and Symptoms  1/6/2024 0750 by Marino Fuller RN  Outcome: Ongoing, Progressing  1/6/2024 0749 by Marino Fuller RN  Outcome: Ongoing, Progressing  Intervention: Prevent or Manage Infection  1/6/2024 0750 by Marino Fuller RN  Flowsheets (Taken 1/6/2024 0750)  Infection Management: aseptic technique maintained  Isolation Precautions: precautions maintained  1/6/2024 0749 by Marino Fuller RN  Flowsheets (Taken 1/6/2024 0749)  Fever Reduction/Comfort Measures: fluid intake increased  Infection Management: aseptic technique maintained  Isolation Precautions: precautions maintained     Problem: Adult Inpatient Plan of Care  Goal: Plan of Care Review  Outcome: Ongoing, Progressing

## 2024-01-07 VITALS
OXYGEN SATURATION: 99 % | SYSTOLIC BLOOD PRESSURE: 113 MMHG | HEIGHT: 61 IN | BODY MASS INDEX: 21.18 KG/M2 | TEMPERATURE: 99 F | DIASTOLIC BLOOD PRESSURE: 66 MMHG | RESPIRATION RATE: 20 BRPM | HEART RATE: 74 BPM | WEIGHT: 112.19 LBS

## 2024-01-07 LAB
ALBUMIN SERPL BCP-MCNC: 2.8 G/DL (ref 3.5–5.2)
ALP SERPL-CCNC: 244 U/L (ref 55–135)
ALT SERPL W/O P-5'-P-CCNC: 57 U/L (ref 10–44)
ANION GAP SERPL CALC-SCNC: 10 MMOL/L (ref 8–16)
AST SERPL-CCNC: 38 U/L (ref 10–40)
BASOPHILS # BLD AUTO: 0.02 K/UL (ref 0–0.2)
BASOPHILS NFR BLD: 0.3 % (ref 0–1.9)
BILIRUB SERPL-MCNC: 0.2 MG/DL (ref 0.1–1)
BUN SERPL-MCNC: 8 MG/DL (ref 6–20)
CALCIUM SERPL-MCNC: 8.8 MG/DL (ref 8.7–10.5)
CHLORIDE SERPL-SCNC: 105 MMOL/L (ref 95–110)
CO2 SERPL-SCNC: 26 MMOL/L (ref 23–29)
CREAT SERPL-MCNC: 0.7 MG/DL (ref 0.5–1.4)
DIFFERENTIAL METHOD BLD: ABNORMAL
EOSINOPHIL # BLD AUTO: 0.2 K/UL (ref 0–0.5)
EOSINOPHIL NFR BLD: 2.7 % (ref 0–8)
ERYTHROCYTE [DISTWIDTH] IN BLOOD BY AUTOMATED COUNT: 13.2 % (ref 11.5–14.5)
ERYTHROCYTE [SEDIMENTATION RATE] IN BLOOD BY PHOTOMETRIC METHOD: 85 MM/HR (ref 0–36)
EST. GFR  (NO RACE VARIABLE): >60 ML/MIN/1.73 M^2
GLUCOSE SERPL-MCNC: 103 MG/DL (ref 70–110)
HCT VFR BLD AUTO: 33.4 % (ref 37–48.5)
HGB BLD-MCNC: 10.8 G/DL (ref 12–16)
IMM GRANULOCYTES # BLD AUTO: 0.02 K/UL (ref 0–0.04)
IMM GRANULOCYTES NFR BLD AUTO: 0.3 % (ref 0–0.5)
INR PPP: 1 (ref 0.8–1.2)
INR PPP: 1 (ref 0.8–1.2)
LYMPHOCYTES # BLD AUTO: 2.4 K/UL (ref 1–4.8)
LYMPHOCYTES NFR BLD: 40 % (ref 18–48)
MCH RBC QN AUTO: 29.7 PG (ref 27–31)
MCHC RBC AUTO-ENTMCNC: 32.3 G/DL (ref 32–36)
MCV RBC AUTO: 92 FL (ref 82–98)
MONOCYTES # BLD AUTO: 0.6 K/UL (ref 0.3–1)
MONOCYTES NFR BLD: 10.6 % (ref 4–15)
NEUTROPHILS # BLD AUTO: 2.7 K/UL (ref 1.8–7.7)
NEUTROPHILS NFR BLD: 46.1 % (ref 38–73)
NRBC BLD-RTO: 0 /100 WBC
PLATELET # BLD AUTO: 354 K/UL (ref 150–450)
PMV BLD AUTO: 9.5 FL (ref 9.2–12.9)
POTASSIUM SERPL-SCNC: 3.8 MMOL/L (ref 3.5–5.1)
PROT SERPL-MCNC: 6.8 G/DL (ref 6–8.4)
PROTHROMBIN TIME: 10.9 SEC (ref 9–12.5)
PROTHROMBIN TIME: 11.1 SEC (ref 9–12.5)
RBC # BLD AUTO: 3.64 M/UL (ref 4–5.4)
SODIUM SERPL-SCNC: 141 MMOL/L (ref 136–145)
WBC # BLD AUTO: 5.87 K/UL (ref 3.9–12.7)

## 2024-01-07 PROCEDURE — 80053 COMPREHEN METABOLIC PANEL: CPT | Performed by: HOSPITALIST

## 2024-01-07 PROCEDURE — 36415 COLL VENOUS BLD VENIPUNCTURE: CPT

## 2024-01-07 PROCEDURE — 25000003 PHARM REV CODE 250

## 2024-01-07 PROCEDURE — 63600175 PHARM REV CODE 636 W HCPCS

## 2024-01-07 PROCEDURE — 25000003 PHARM REV CODE 250: Performed by: PHYSICIAN ASSISTANT

## 2024-01-07 PROCEDURE — 99900035 HC TECH TIME PER 15 MIN (STAT)

## 2024-01-07 PROCEDURE — 94761 N-INVAS EAR/PLS OXIMETRY MLT: CPT

## 2024-01-07 PROCEDURE — 63600175 PHARM REV CODE 636 W HCPCS: Performed by: PHYSICIAN ASSISTANT

## 2024-01-07 PROCEDURE — 85610 PROTHROMBIN TIME: CPT

## 2024-01-07 PROCEDURE — 85652 RBC SED RATE AUTOMATED: CPT

## 2024-01-07 RX ORDER — HEPARIN 100 UNIT/ML
1 SYRINGE INTRAVENOUS ONCE
Status: COMPLETED | OUTPATIENT
Start: 2024-01-07 | End: 2024-01-07

## 2024-01-07 RX ORDER — LOPERAMIDE HYDROCHLORIDE 2 MG/1
2 CAPSULE ORAL 4 TIMES DAILY PRN
Status: DISCONTINUED | OUTPATIENT
Start: 2024-01-07 | End: 2024-01-07 | Stop reason: HOSPADM

## 2024-01-07 RX ORDER — MORPHINE SULFATE 15 MG/1
15 TABLET ORAL EVERY 4 HOURS PRN
Qty: 42 TABLET | Refills: 0 | Status: SHIPPED | OUTPATIENT
Start: 2024-01-07 | End: 2024-01-07 | Stop reason: HOSPADM

## 2024-01-07 RX ORDER — LOPERAMIDE HYDROCHLORIDE 2 MG/1
2 CAPSULE ORAL 4 TIMES DAILY PRN
Qty: 40 CAPSULE | Refills: 0 | Status: ON HOLD | OUTPATIENT
Start: 2024-01-07 | End: 2024-01-26

## 2024-01-07 RX ORDER — SODIUM CHLORIDE, SODIUM LACTATE, POTASSIUM CHLORIDE, CALCIUM CHLORIDE 600; 310; 30; 20 MG/100ML; MG/100ML; MG/100ML; MG/100ML
INJECTION, SOLUTION INTRAVENOUS CONTINUOUS
Status: ACTIVE | OUTPATIENT
Start: 2024-01-07 | End: 2024-01-07

## 2024-01-07 RX ORDER — HYDROCODONE BITARTRATE AND ACETAMINOPHEN 7.5; 325 MG/1; MG/1
1 TABLET ORAL EVERY 4 HOURS PRN
Qty: 42 TABLET | Refills: 0 | Status: SHIPPED | OUTPATIENT
Start: 2024-01-07 | End: 2024-01-14

## 2024-01-07 RX ORDER — HYDROMORPHONE HYDROCHLORIDE 1 MG/ML
0.5 INJECTION, SOLUTION INTRAMUSCULAR; INTRAVENOUS; SUBCUTANEOUS EVERY 6 HOURS PRN
Status: DISCONTINUED | OUTPATIENT
Start: 2024-01-07 | End: 2024-01-07 | Stop reason: HOSPADM

## 2024-01-07 RX ORDER — MORPHINE SULFATE 15 MG/1
15 TABLET ORAL EVERY 4 HOURS PRN
Qty: 42 TABLET | Refills: 0 | Status: SHIPPED | OUTPATIENT
Start: 2024-01-07 | End: 2024-01-07

## 2024-01-07 RX ORDER — PROMETHAZINE HYDROCHLORIDE 25 MG/1
25 TABLET ORAL EVERY 6 HOURS PRN
Qty: 40 TABLET | Refills: 0 | Status: ON HOLD | OUTPATIENT
Start: 2024-01-07 | End: 2024-01-26

## 2024-01-07 RX ADMIN — HYDROMORPHONE HYDROCHLORIDE 0.5 MG: 1 INJECTION, SOLUTION INTRAMUSCULAR; INTRAVENOUS; SUBCUTANEOUS at 01:01

## 2024-01-07 RX ADMIN — CLONAZEPAM 1 MG: 0.5 TABLET ORAL at 08:01

## 2024-01-07 RX ADMIN — MORPHINE SULFATE 30 MG: 15 TABLET ORAL at 12:01

## 2024-01-07 RX ADMIN — DRONABINOL 5 MG: 2.5 CAPSULE ORAL at 06:01

## 2024-01-07 RX ADMIN — SODIUM CHLORIDE, POTASSIUM CHLORIDE, SODIUM LACTATE AND CALCIUM CHLORIDE: 600; 310; 30; 20 INJECTION, SOLUTION INTRAVENOUS at 08:01

## 2024-01-07 RX ADMIN — GABAPENTIN 300 MG: 300 CAPSULE ORAL at 08:01

## 2024-01-07 RX ADMIN — THERA TABS 1 TABLET: TAB at 08:01

## 2024-01-07 RX ADMIN — FERROUS SULFATE TAB EC 325 MG (65 MG FE EQUIVALENT) 1 EACH: 325 (65 FE) TABLET DELAYED RESPONSE at 08:01

## 2024-01-07 RX ADMIN — PROCHLORPERAZINE EDISYLATE 5 MG: 5 INJECTION INTRAMUSCULAR; INTRAVENOUS at 06:01

## 2024-01-07 RX ADMIN — PANTOPRAZOLE SODIUM 40 MG: 40 TABLET, DELAYED RELEASE ORAL at 08:01

## 2024-01-07 RX ADMIN — PROCHLORPERAZINE EDISYLATE 5 MG: 5 INJECTION INTRAMUSCULAR; INTRAVENOUS at 12:01

## 2024-01-07 RX ADMIN — HYDROMORPHONE HYDROCHLORIDE 0.5 MG: 1 INJECTION, SOLUTION INTRAMUSCULAR; INTRAVENOUS; SUBCUTANEOUS at 06:01

## 2024-01-07 RX ADMIN — MORPHINE SULFATE 30 MG: 15 TABLET ORAL at 10:01

## 2024-01-07 RX ADMIN — HEPARIN 100 UNITS: 100 SYRINGE at 06:01

## 2024-01-07 NOTE — TREATMENT PLAN
Hepatology Treatment Plan    Ivy Salgado is a 43 y.o. female admitted to hospital 12/31/2023 (Hospital Day: 8) due to Crohn's disease of small intestine with complication.     Interval History  VSS this AM. Afebrile.     >42>38, >67>57.    Serological workup so far unrevealing.     Objective  Temp:  [97.7 °F (36.5 °C)-99 °F (37.2 °C)] 99 °F (37.2 °C) (01/07 1157)  Pulse:  [65-91] 79 (01/07 1157)  BP: ()/(55-64) 100/62 (01/07 1157)  Resp:  [16-20] 18 (01/07 1323)  SpO2:  [96 %-99 %] 96 % (01/07 1157)        Laboratory    Lab Results   Component Value Date    WBC 5.87 01/06/2024    HGB 10.8 (L) 01/06/2024    HCT 33.4 (L) 01/06/2024    MCV 92 01/06/2024     01/06/2024       Lab Results   Component Value Date     01/07/2024    K 3.8 01/07/2024     01/07/2024    CO2 26 01/07/2024    BUN 8 01/07/2024    CREATININE 0.7 01/07/2024    CALCIUM 8.8 01/07/2024       Lab Results   Component Value Date    ALBUMIN 2.8 (L) 01/07/2024    ALT 57 (H) 01/07/2024    AST 38 01/07/2024    ALKPHOS 244 (H) 01/07/2024    BILITOT 0.2 01/07/2024       Lab Results   Component Value Date    INR 1.0 01/07/2024    INR 1.0 01/06/2024    INR 1.1 02/02/2018         Assessment  Patient is a 42yo F with a PMH of Crohn's not currently on treatment admitted Jan 31 for concerns of a flare. 2 days after admission patient noted to have rising LFTs of unclear etiology. This trend has continued. AST/ALT were normal at admission but now 66/88 respectively. Alk phos has also increased from normal to 267. Bili remains normal at 0.3. An MRCP was obtained showing  No evidence of obstruction and a 3mm cystic focus in the pancreatic tail probably communicating with the main pancreatic duct, likely a side branch IPMN. LFTs now declining.      1. Crohn's disease of small intestine with other complication   2. Nausea   3. Chest pain   4. Elevated liver function tests   5.         IPMN        Pertinent  labs/imaging:  Alpha-1-antitrypsin: 192  Ceruloplasmin: 40  Ferritin: 26  SAURAV: Negative   AMA: Negative   ASMA: Negative    US liver with Doppler: Satisfactory Doppler evaluation of the liver. Mildly dilated bile ducts in the central right hepatic lobe.  Prominence of the common bile duct is better evaluated on CT 12/31/2023.     MRI abdomen with & without contrast: No evidence of biliary obstruction. Punctate cystic focus in the pancreatic tail probably communicating with the main pancreatic duct, likely a side branch IPMN.  Too small to characterize although no worrisome features.  Follow-up in 1 year is recommended.    Plan  - Will arrange hepatology clinic f/up. No indication for liver biopsy at this time as LFTs are declining.   -  Will follow IgG, EBV DNA PCR, HSV DNA PCR & CMV DNA PCR.   - please obtain daily CBC, CMP, INR  - We will arrange follow up in hepatology clinic  - Plan of care was discussed with primary team    Thank you for involving us in the care of Ivy Salgado. Please call with any additional concerns or questions. Will are signing off.     Connor Hendrickson MD, PGY-V  Gastroenterology Fellow  [facility]

## 2024-01-07 NOTE — PLAN OF CARE
Problem: Infection  Goal: Absence of Infection Signs and Symptoms  1/7/2024 0349 by Praveena Burton RN  Outcome: Ongoing, Progressing  1/7/2024 0348 by Praveena Burton RN  Outcome: Ongoing, Progressing     Problem: Adult Inpatient Plan of Care  Goal: Plan of Care Review  1/7/2024 0349 by Praveena Burton RN  Outcome: Ongoing, Progressing  1/7/2024 0348 by Praveena Burton RN  Outcome: Ongoing, Progressing  Goal: Patient-Specific Goal (Individualized)  1/7/2024 0349 by Praveena Burton RN  Outcome: Ongoing, Progressing  1/7/2024 0348 by Praveena Burton RN  Outcome: Ongoing, Progressing  Goal: Absence of Hospital-Acquired Illness or Injury  1/7/2024 0349 by Praveena Burton RN  Outcome: Ongoing, Progressing  1/7/2024 0348 by Praveena Burton RN  Outcome: Ongoing, Progressing  Goal: Optimal Comfort and Wellbeing  1/7/2024 0349 by Praveena Burton RN  Outcome: Ongoing, Progressing  1/7/2024 0348 by Praveena Burton RN  Outcome: Ongoing, Progressing  Goal: Readiness for Transition of Care  1/7/2024 0349 by Praveena Burton RN  Outcome: Ongoing, Progressing  1/7/2024 0348 by Praveena Burton RN  Outcome: Ongoing, Progressing     Problem: Pain Acute  Goal: Acceptable Pain Control and Functional Ability  1/7/2024 0349 by Praveena Burton RN  Outcome: Ongoing, Progressing  1/7/2024 0348 by Praveena Burton RN  Outcome: Ongoing, Progressing     Problem: Activity and Energy Impairment (Anxiety Signs/Symptoms)  Goal: Optimized Energy Level (Anxiety Signs/Symptoms)  1/7/2024 0349 by Praveena Burton RN  Outcome: Ongoing, Progressing  1/7/2024 0348 by Praveena Burton RN  Outcome: Ongoing, Progressing     Problem: Cognitive Impairment (Anxiety Signs/Symptoms)  Goal: Optimized Cognitive Function (Anxiety Signs/Symptoms)  1/7/2024 0349 by Praveena Burton RN  Outcome: Ongoing, Progressing  1/7/2024 0348 by Praveena Burton RN  Outcome: Ongoing, Progressing     Problem: Mood Impairment  (Anxiety Signs/Symptoms)  Goal: Improved Mood Symptoms (Anxiety Signs/Symptoms)  1/7/2024 0349 by Praveena Burton RN  Outcome: Ongoing, Progressing  1/7/2024 0348 by Praveena Burton RN  Outcome: Ongoing, Progressing     Problem: Sleep Impairment (Anxiety Signs/Symptoms)  Goal: Improved Sleep (Anxiety Signs/Symptoms)  1/7/2024 0349 by Praveena Burton RN  Outcome: Ongoing, Progressing  1/7/2024 0348 by Praveena Burton RN  Outcome: Ongoing, Progressing     Problem: Social, Occupational or Functional Impairment (Anxiety Signs/Symptoms)  Goal: Enhanced Social, Occupational or Functional Skills (Anxiety Signs/Symptoms)  1/7/2024 0349 by Praveena Burton RN  Outcome: Ongoing, Progressing  1/7/2024 0348 by Praveena Burton RN  Outcome: Ongoing, Progressing     Problem: Somatic Disturbance (Anxiety Signs/Symptoms)  Goal: Improved Somatic Symptoms (Anxiety Signs/Symptoms)  1/7/2024 0349 by Praveena Burton RN  Outcome: Ongoing, Progressing  1/7/2024 0348 by Praveena Burton RN  Outcome: Ongoing, Progressing     Problem: Adjustment to Illness (Bowel Disease, Inflammatory)  Goal: Optimal Adaptation to Chronic Illness  1/7/2024 0349 by Praveena Burton RN  Outcome: Ongoing, Progressing  1/7/2024 0348 by Praveena Burton RN  Outcome: Ongoing, Progressing     Problem: Diarrhea (Bowel Disease, Inflammatory)  Goal: Diarrhea Symptom Relief  1/7/2024 0349 by Praveena Burton RN  Outcome: Ongoing, Progressing  1/7/2024 0348 by Praveena Burton RN  Outcome: Ongoing, Progressing     Problem: Infection (Bowel Disease, Inflammatory)  Goal: Absence of Infection Signs and Symptoms  1/7/2024 0349 by Praveena Burton RN  Outcome: Ongoing, Progressing  1/7/2024 0348 by Praveena Burton RN  Outcome: Ongoing, Progressing     Problem: Nutrition Impaired (Bowel Disease, Inflammatory)  Goal: Optimal Nutrition  1/7/2024 0349 by Praveena Burton RN  Outcome: Ongoing, Progressing  1/7/2024 0348 by Praveena Burton  RN  Outcome: Ongoing, Progressing     Problem: Pain (Bowel Disease, Inflammatory)  Goal: Acceptable Pain Control  1/7/2024 0349 by Praveena Burton RN  Outcome: Ongoing, Progressing  1/7/2024 0348 by Praveena Burton RN  Outcome: Ongoing, Progressing     Problem: Skin Injury Risk Increased  Goal: Skin Health and Integrity  1/7/2024 0349 by Praveena Burton RN  Outcome: Ongoing, Progressing  1/7/2024 0348 by Praveena Burton RN  Outcome: Ongoing, Progressing     Problem: Fall Injury Risk  Goal: Absence of Fall and Fall-Related Injury  1/7/2024 0349 by Praveena Burton RN  Outcome: Ongoing, Progressing  1/7/2024 0348 by Praveena Burton RN  Outcome: Ongoing, Progressing

## 2024-01-07 NOTE — CONSULTS
MEENU called to bedside to administer cathflo into honorio cath. Explained to bedside RN, PICC team does not perform that procedure, and ask charge nurse for assistance.

## 2024-01-07 NOTE — NURSING
Report given , informed Cath parker ordered but unable to administer and IV team came to the unit and ask for me to refer to charge nurse , Receiving nurse Quiana and Debbie will assess for blood for labs via [facility] , patient updated regarding the needed blood specimens .

## 2024-01-07 NOTE — PLAN OF CARE
Patient appointments have been scheduled and added to AVS. Patient family to provide transportation home at discharge. All CM needs have been met.    01/07/24 1528   Final Note   Assessment Type Final Discharge Note   Anticipated Discharge Disposition Home   Hospital Resources/Appts/Education Provided Provided patient/caregiver with written discharge plan information;Appointments scheduled and added to AVS   Post-Acute Status   Discharge Delays None known at this time     [facility]  Discharge Final Note    Primary Care Provider: Luis Madden DO    Expected Discharge Date: 1/7/2024    Final Discharge Note (most recent)       Final Note - 01/07/24 1528          Final Note    Assessment Type Final Discharge Note (P)      Anticipated Discharge Disposition Home or Self Care (P)      Hospital Resources/Appts/Education Provided Provided patient/caregiver with written discharge plan information;Appointments scheduled and added to AVS (P)         Post-Acute Status    Discharge Delays None known at this time (P)                      Important Message from Medicare             Contact Info       Luis Madden DO   Specialty: Family Medicine   Relationship: PCP - General    [address] [facility]  [address] [place], [place] [address]   Phone: [phone]       Next Steps: Schedule an appointment as soon as possible for a visit in 1 week(s)

## 2024-01-07 NOTE — TREATMENT PLAN
GI Treatment Plan    Ivy Salgado is a 43 y.o. female admitted to hospital 12/31/2023 (Hospital Day: 8) due to Crohn's disease of small intestine with complication.     Interval History  Liver enzymes and output improving. Plan to discharge home.    Objective  Temp:  [97.7 °F (36.5 °C)-99 °F (37.2 °C)] 99 °F (37.2 °C) (01/07 1157)  Pulse:  [65-91] 79 (01/07 1157)  BP: ()/(55-64) 100/62 (01/07 1157)  Resp:  [16-20] 18 (01/07 1323)  SpO2:  [96 %-99 %] 96 % (01/07 1157)    Laboratory    Recent Labs   Lab 01/04/24  0400 01/05/24  0455 01/06/24  2353   HGB 10.9* 11.2* 10.8*       Lab Results   Component Value Date    WBC 5.87 01/06/2024    HGB 10.8 (L) 01/06/2024    HCT 33.4 (L) 01/06/2024    MCV 92 01/06/2024     01/06/2024       Lab Results   Component Value Date     01/07/2024    K 3.8 01/07/2024     01/07/2024    CO2 26 01/07/2024    BUN 8 01/07/2024    CREATININE 0.7 01/07/2024    CALCIUM 8.8 01/07/2024    ANIONGAP 10 01/07/2024    ESTGFRAFRICA >60.0 03/24/2022    EGFRNONAA >60.0 03/24/2022       Lab Results   Component Value Date    ALT 57 (H) 01/07/2024    AST 38 01/07/2024    ALKPHOS 244 (H) 01/07/2024    BILITOT 0.2 01/07/2024       Lab Results   Component Value Date    INR 1.0 01/07/2024    INR 1.0 01/06/2024    INR 1.1 02/02/2018   Ivy Salgado is a 43 y.o. 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 GI consulted for concerns for an IBD flare.       This morning patient reports having to change her ostomy 6x in the last 24 hours. She continues to report she is not eating and drinking. Her LFTs trended down this AM. MRCP did not show ductal dilation but did show a 3 mm pancreatic cyst which will need follow up in pancreatic cyst clinic after discharge.     We have discussed restarting Entyvio with her to treat her recurrent crohn's seen on ileoscopy but need to have her LFTs trending down and liver work up completed. She was agreeable to this and to transitioning care to IBD clinic from Dr. Ross.      Recurrent Crohn's Disease  Elevated LFTs  Pancreatic cyst (3 mm)     Plan:  - Will arrange outpatient follow up with Dr. Garcia and get Entyvio set up as an outpatient.  - Recommend outpatient referral to [facility] pancreatic cyst clinic on discharge  - Outpatient Hepatology clinic follow up.  - Follow up stool calprotectin  - Avoid NSAIDs since this may exacerbate IBD  - We are signing-off. Please call with any questions.    Thank you for involving us in the care of Ivy Salgado. Please call with any additional questions, concerns or changes in the patient's clinical status.    Beronica Holguin MD  Gastroenterology

## 2024-01-08 ENCOUNTER — PATIENT MESSAGE (OUTPATIENT)
Dept: INTERNAL MEDICINE | Facility: CLINIC | Age: 44
End: 2024-01-08

## 2024-01-08 ENCOUNTER — PATIENT MESSAGE (OUTPATIENT)
Dept: REHABILITATION | Facility: HOSPITAL | Age: 44
End: 2024-01-08
Payer: COMMERCIAL

## 2024-01-08 DIAGNOSIS — F41.1 GENERALIZED ANXIETY DISORDER: ICD-10-CM

## 2024-01-08 LAB
CALPROTECTIN STL-MCNT: 281.3 MCG/G
CMV DNA SPEC QL NAA+PROBE: NORMAL
CYTOMEGALOVIRUS PCR, QUANT: NOT DETECTED IU/ML
EPSTEIN-BARR VIRUS DNA: NORMAL
EPSTEIN-BARR VIRUS PCR, QUANT: NOT DETECTED IU/ML
FINAL PATHOLOGIC DIAGNOSIS: NORMAL
GROSS: NORMAL
HSV AB, IGM BY EIA: 0.47 INDEX
Lab: NORMAL

## 2024-01-08 RX ORDER — DIPHENHYDRAMINE HYDROCHLORIDE 50 MG/ML
25 INJECTION, SOLUTION INTRAMUSCULAR; INTRAVENOUS
Status: CANCELLED | OUTPATIENT
Start: 2024-01-08

## 2024-01-08 RX ORDER — METHYLPREDNISOLONE SOD SUCC 125 MG
40 VIAL (EA) INJECTION
Status: CANCELLED | OUTPATIENT
Start: 2024-01-08

## 2024-01-08 RX ORDER — ACETAMINOPHEN 325 MG/1
650 TABLET ORAL
Status: CANCELLED | OUTPATIENT
Start: 2024-01-08

## 2024-01-08 RX ORDER — HEPARIN 100 UNIT/ML
500 SYRINGE INTRAVENOUS
Status: CANCELLED | OUTPATIENT
Start: 2024-01-08

## 2024-01-08 RX ORDER — EPINEPHRINE 1 MG/ML
0.3 INJECTION, SOLUTION, CONCENTRATE INTRAVENOUS
Status: CANCELLED | OUTPATIENT
Start: 2024-01-08

## 2024-01-08 RX ORDER — SODIUM CHLORIDE 0.9 % (FLUSH) 0.9 %
10 SYRINGE (ML) INJECTION
Status: CANCELLED | OUTPATIENT
Start: 2024-01-08

## 2024-01-08 RX ORDER — IPRATROPIUM BROMIDE AND ALBUTEROL SULFATE 2.5; .5 MG/3ML; MG/3ML
3 SOLUTION RESPIRATORY (INHALATION)
Status: CANCELLED | OUTPATIENT
Start: 2024-01-08

## 2024-01-08 RX ORDER — CLONAZEPAM 1 MG/1
1 TABLET ORAL 2 TIMES DAILY
Qty: 60 TABLET | Refills: 2 | Status: SHIPPED | OUTPATIENT
Start: 2024-01-08

## 2024-01-08 NOTE — NURSING
Discharge instructions and education given to pt and family. Verbalized understanding. Deaccessed port. No redness, swelling or drainage noted. Telemetry removed.  Pt ambulated off the unit.

## 2024-01-08 NOTE — PLAN OF CARE
Problem: Infection  Goal: Absence of Infection Signs and Symptoms  Outcome: Met     Problem: Adult Inpatient Plan of Care  Goal: Plan of Care Review  Outcome: Met  Goal: Patient-Specific Goal (Individualized)  Outcome: Met  Goal: Absence of Hospital-Acquired Illness or Injury  Outcome: Met  Goal: Optimal Comfort and Wellbeing  Outcome: Met  Goal: Readiness for Transition of Care  Outcome: Met     Problem: Pain Acute  Goal: Acceptable Pain Control and Functional Ability  Outcome: Met     Problem: Activity and Energy Impairment (Anxiety Signs/Symptoms)  Goal: Optimized Energy Level (Anxiety Signs/Symptoms)  Outcome: Met     Problem: Cognitive Impairment (Anxiety Signs/Symptoms)  Goal: Optimized Cognitive Function (Anxiety Signs/Symptoms)  Outcome: Met     Problem: Mood Impairment (Anxiety Signs/Symptoms)  Goal: Improved Mood Symptoms (Anxiety Signs/Symptoms)  Outcome: Met     Problem: Sleep Impairment (Anxiety Signs/Symptoms)  Goal: Improved Sleep (Anxiety Signs/Symptoms)  Outcome: Met     Problem: Social, Occupational or Functional Impairment (Anxiety Signs/Symptoms)  Goal: Enhanced Social, Occupational or Functional Skills (Anxiety Signs/Symptoms)  Outcome: Met     Problem: Somatic Disturbance (Anxiety Signs/Symptoms)  Goal: Improved Somatic Symptoms (Anxiety Signs/Symptoms)  Outcome: Met     Problem: Adjustment to Illness (Bowel Disease, Inflammatory)  Goal: Optimal Adaptation to Chronic Illness  Outcome: Met     Problem: Diarrhea (Bowel Disease, Inflammatory)  Goal: Diarrhea Symptom Relief  Outcome: Met     Problem: Infection (Bowel Disease, Inflammatory)  Goal: Absence of Infection Signs and Symptoms  Outcome: Met     Problem: Nutrition Impaired (Bowel Disease, Inflammatory)  Goal: Optimal Nutrition  Outcome: Met     Problem: Pain (Bowel Disease, Inflammatory)  Goal: Acceptable Pain Control  Outcome: Met     Problem: Skin Injury Risk Increased  Goal: Skin Health and Integrity  Outcome: Met     Problem: Fall  Injury Risk  Goal: Absence of Fall and Fall-Related Injury  Outcome: Met

## 2024-01-08 NOTE — DISCHARGE SUMMARY
[facility] [address] [place] [address] Medicine  Discharge Summary      Patient Name: Ivy Salgado  MRN: [id number]  IGNACIO: [id number]  Patient Class: IP- Inpatient  Admission Date: 12/31/2023  Hospital Length of Stay: 5 days  Discharge Date and Time: 1/7/2024  6:46 PM  Attending Physician: No att. providers found   Discharging Provider: Naomy Gary PA-C  Primary Care Provider: Luis Madden DO  [facility] [place] [facility] [place] [facility] [place] Naomy Gary PA-C  [place] Care Team[place] [facility] [place] [facility]    HPI:   Ivy Salgado is a 43 y.o. female with a PMHx of ARPITA, Crohn's s/p ileostomy on Entyvio who [place] [facility] [place]. Patient reprots intermittnet severe generalized abdominal pain for the past 4 days. Pain is worse on her right side and occasionally radiates to her flank. Endorses associated increased ostomy output, poor appetite, and nausea without any vomiting. She feels flushed and had increased temp at home but no fever (reported Tmax 100.2). Symptoms are consistent with prior Crohn's flares, has not had a flare since 2020.  Denies dark/bloody stools, HA, vision changes, chest pain, SOB, LE swelling or syncope.    ED: AFVSS. No leukocytosis or electrolyte abnormalities. ESR 94, CRP 22.8. UA noninfectious. CT abd/pelvis shows slow flow through a few mid to distal small bowel loops noting venous vascular engorgement about these loops and wall hyperemia concerning for inflammatory enteritis. ED provider spoke with GI who recommend holding antibiotics for now and NPO at midnight for potential scope in AM. Given IV dilaudid 1mg x2 and IV compazine.     Procedure(s) (LRB):  EGD (ESOPHAGOGASTRODUODENOSCOPY) (N/A)  ILEOSCOPY (N/A)      Hospital Course:   Ms. Salgado was admitted to hospital medicine for inflammatory enteritis and suspected acute crohn's flare. C. Diff negative, stool culture without significant growth to date. GI consulted and performed EGD and ileoscopy. EGD with erythematous  mucosa in the prepyloric region of the stomach. Ileoscopy revealed ulcers in the distal ileum. Biopsies pending. Abdominal U/S revealed mildly dilated bile ducts in the central right hepatic lobe and the patient has uptrending transaminitis without elevated T reji. 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 Hepatology consulted: awaiting serology results. Pt with soft pressures - giving gentle IVFs with improvement. Pt output and pain well controlled. GI with plans to follow up with patient in clinic and restart entyvio. Patient medically ready for discharge. Plan to follow up with PCP, GI, hepatology. Return precautions provided. Plan of care discussed with patient, patient agreeable with plan, and all questions answered.     Goals of Care Treatment Preferences:  Code Status: Full Code    Living Will: Yes              Consults:   Consults (From admission, onward)          Status Ordering Provider     Inpatient consult to Midline team  Once        Provider:  (Not yet assigned)    Completed VALE SHERIDAN     Inpatient consult to Hepatology  Once        Provider:  (Not yet assigned)    Completed MARGARITO FERRELL     Inpatient consult to Gastroenterology  Once        Provider:  (Not yet assigned)    Completed MARIA T CARRINGTON            No new Assessment & Plan notes have been filed under this hospital service since the last note was generated.  Service: Hospital Medicine    Final Active Diagnoses:    Diagnosis Date Noted POA    PRINCIPAL PROBLEM:  Crohn's disease of small intestine with complication [K50.019] 02/21/2017 Yes    Elevated liver function tests [R79.89] 01/04/2024 Yes    Transaminitis [R74.01] 01/03/2024 No    Generalized anxiety disorder [F41.1] 09/18/2013 Yes    S/P ileostomy [Z93.2] 07/09/2012 Not Applicable     Chronic      Problems Resolved During this Admission:       Discharged Condition:  good    Disposition: Home or Self Care    Follow Up:   Follow-up Information       Lius Madden DO. Schedule an appointment as soon as possible for a visit in 1 week(s).    Specialty: Family Medicine  Contact information:  [address] [facility]  [address]  [facility] [address]  [phone]                           Patient Instructions:      CT Abdomen With IV Contrast Routine Oral Contrast   Standing Status: Future Standing Exp. Date: 01/07/25     Order Specific Question Answer Comments   Does this patient have impaired renal function? No    May the Radiologist modify the order per protocol to meet the clinical needs of the patient? Yes    Oral/Rectal Contrast instructions: Routine Oral Contrast    This patient has an implanted port documented.  Can this port be accessed for contrast administration for this scan? No    Diabetes? No      Ambulatory referral/consult to Hepatology   Standing Status: Future   Referral Priority: Routine Referral Type: Consultation   Referral Reason: Specialty Services Required   Requested Specialty: Hepatology   Number of Visits Requested: 1     Ambulatory referral/consult to Gastroenterology   Standing Status: Future   Referral Priority: Urgent Referral Type: Consultation   Referral Reason: Specialty Services Required   Requested Specialty: Gastroenterology   Number of Visits Requested: 1     Notify your health care provider if you experience any of the following:  temperature >100.4     Notify your health care provider if you experience any of the following:  persistent nausea and vomiting or diarrhea     Notify your health care provider if you experience any of the following:  severe uncontrolled pain     Activity as tolerated       Significant Diagnostic Studies: N/A    Pending Diagnostic Studies:       Procedure Component Value Units Date/Time    SAURAV [[id number]] Collected: 01/06/24 2977    Order Status: Sent Lab Status: In process Updated: 01/07/24 0008    Specimen: Blood      Anti-smooth muscle antibody [[id number]] Collected: 01/06/24 2353    Order Status: Sent Lab Status: In process Updated: 01/07/24 0008    Specimen: Blood     Antimitochondrial antibody [[id number]] Collected: 01/06/24 2353    Order Status: Sent Lab Status: In process Updated: 01/07/24 0008    Specimen: Blood     CMV DNA, quantitative, PCR [[id number]] Collected: 01/05/24 1325    Order Status: Sent Lab Status: In process Updated: 01/05/24 1553    Specimen: Blood     Calprotectin, Stool [[id number]] Collected: 12/31/23 2122    Order Status: Sent Lab Status: In process Updated: 12/31/23 2146    Specimen: Stool     Dickson-Barr virus DNA, quantitative [[id number]] Collected: 01/05/24 1325    Order Status: Sent Lab Status: In process Updated: 01/05/24 1553    Specimen: Blood     Hepatitis A antibody, IgM [[id number]] Collected: 12/04/23 1500    Order Status: Sent Lab Status: In process Updated: 01/04/24 1527    Specimen: Blood     Hepatitis C antibody [[id number]] Collected: 12/04/23 1500    Order Status: Sent Lab Status: In process Updated: 01/04/24 1527    Specimen: Blood     IgG [[id number]] Collected: 12/04/23 1500    Order Status: Sent Lab Status: In process Updated: 01/04/24 1527    Specimen: Blood     Specimen to Pathology, Surgery Gastrointestinal tract [[id number]] Collected: 01/03/24 0825    Order Status: Sent Lab Status: In process Updated: 01/03/24 1008    Specimen: Tissue            Medications:  Reconciled Home Medications:      Medication List        CHANGE how you take these medications      HYDROcodone-acetaminophen 7.5-325 mg per tablet  Commonly known as: NORCO  Take 1 tablet by mouth every 4 (four) hours as needed for Pain.  What changed: when to take this     loperamide 2 mg capsule  Commonly known as: IMODIUM  Take 1 capsule (2 mg total) by mouth 4 (four) times daily as needed for Diarrhea.  What changed: when to take this     promethazine 25 MG tablet  Commonly known as: PHENERGAN  Take 1 tablet (25  mg total) by mouth every 6 (six) hours as needed for Nausea.  What changed:   how much to take  how to take this  when to take this  reasons to take this  additional instructions            CONTINUE taking these medications      clonazePAM 1 MG tablet  Commonly known as: KlonoPIN  Take 1 tablet (1 mg total) by mouth 2 (two) times daily.     cyclobenzaprine 10 MG tablet  Commonly known as: FLEXERIL  Take 1 tablet (10 mg total) by mouth every evening as needed for muscle pain     droNABinol 5 MG capsule  Commonly known as: MARINOL  Take 1 capsule (5 mg total) by mouth 2 (two) times daily before meals.     estradioL 0.01 % (0.1 mg/gram) vaginal cream  Commonly known as: ESTRACE  Place 1 g vaginally once daily.     FLUARIX QUAD [id number] (PF) 60 mcg (15 mcg x 4)/0.5 mL Syrg  Generic drug: flu vacc [id number] 6mos up(PF)  inject into muscle for one dose     fluconazole 150 MG Tab  Commonly known as: DIFLUCAN  Take 1 tablet (150 mg total) by mouth every 72 hours as needed (vaginal yeast).     gabapentin 300 MG capsule  Commonly known as: NEURONTIN  Take 1 capsule (300 mg total) by mouth 2 (two) times daily.     mirtazapine 30 MG disintegrating tablet  Commonly known as: REMERON SOL-TAB  Take 1 tablet (30 mg total) by mouth nightly.     multivitamin per tablet  Commonly known as: THERAGRAN  Take 1 tablet by mouth once daily.     nadoloL 40 MG tablet  Commonly known as: CORGARD  Take 1 tablet (40 mg total) by mouth once daily. Patient needs appointment before next refill.     pantoprazole 40 MG tablet  Commonly known as: PROTONIX  Take 1 tablet (40 mg total) by mouth 2 (two) times daily.     tamsulosin 0.4 mg Cap  Commonly known as: FLOMAX  Take 1 capsule (0.4 mg total) by mouth once daily.     triamcinolone acetonide 0.1% 0.1 % cream  Commonly known as: KENALOG  Apply topically 2 (two) times daily.     valACYclovir 500 MG tablet  Commonly known as: VALTREX  Take 1 tablet (500 mg total) by mouth once daily.     vedolizumab  300 mg Solr injection  Commonly known as: ENTYVIO  Inject 300 mg into the vein.     zolpidem 10 mg Tab  Commonly known as: AMBIEN  Take 1 tablet (10 mg total) by mouth every evening.              Indwelling Lines/Drains at time of discharge:   Lines/Drains/Airways       Drain  Duration                  Ileostomy 06/16/12 0000 RLQ 4223 days                    Time spent on the discharge of patient: 36 minutes         Naomy Gary PA-C  Department of Hospital Medicine  [facility] - Observation 11H

## 2024-01-08 NOTE — TELEPHONE ENCOUNTER
No care due was identified.  [facility] Embedded Care Due Messages. Reference number: [id number].   1/08/2024 7:26:15 AM CST

## 2024-01-09 ENCOUNTER — TELEPHONE (OUTPATIENT)
Dept: GASTROENTEROLOGY | Facility: CLINIC | Age: 44
End: 2024-01-09
Payer: COMMERCIAL

## 2024-01-09 NOTE — TELEPHONE ENCOUNTER
----- Message from Fannie Kelly sent at 1/9/2024  4:05 PM CST -----  Regarding: Appt  Contact: pt [phone]  Pt is scheduled for hospital follow/New Patient 1/16, pt is employed with [employer] would like to be seen 1/15 due to work schedule, or 1/16 after 230 if possible, please call [phone]

## 2024-01-09 NOTE — TELEPHONE ENCOUNTER
Spoke with Ivy:  - having difficulty getting time off from work  - no appts available sooner than 1/16, afternoon appts not available   - she will keep her appt as scheduled

## 2024-01-10 ENCOUNTER — LAB VISIT (OUTPATIENT)
Dept: LAB | Facility: HOSPITAL | Age: 44
End: 2024-01-10
Attending: INTERNAL MEDICINE
Payer: COMMERCIAL

## 2024-01-10 ENCOUNTER — PATIENT MESSAGE (OUTPATIENT)
Dept: REHABILITATION | Facility: HOSPITAL | Age: 44
End: 2024-01-10

## 2024-01-10 ENCOUNTER — CLINICAL SUPPORT (OUTPATIENT)
Dept: REHABILITATION | Facility: HOSPITAL | Age: 44
End: 2024-01-10
Payer: COMMERCIAL

## 2024-01-10 ENCOUNTER — OFFICE VISIT (OUTPATIENT)
Dept: HEPATOLOGY | Facility: CLINIC | Age: 44
End: 2024-01-10
Payer: COMMERCIAL

## 2024-01-10 VITALS
SYSTOLIC BLOOD PRESSURE: 107 MMHG | RESPIRATION RATE: 18 BRPM | DIASTOLIC BLOOD PRESSURE: 53 MMHG | OXYGEN SATURATION: 97 % | WEIGHT: 113.13 LBS | HEART RATE: 73 BPM | HEIGHT: 61 IN | TEMPERATURE: 97 F | BODY MASS INDEX: 21.36 KG/M2

## 2024-01-10 DIAGNOSIS — K50.018 CROHN'S DISEASE OF SMALL INTESTINE WITH OTHER COMPLICATION: ICD-10-CM

## 2024-01-10 DIAGNOSIS — Z23 NEED FOR HEPATITIS A VACCINATION: ICD-10-CM

## 2024-01-10 DIAGNOSIS — R79.89 ELEVATED LIVER FUNCTION TESTS: ICD-10-CM

## 2024-01-10 DIAGNOSIS — M25.612 DECREASED RANGE OF MOTION OF LEFT SHOULDER: Primary | ICD-10-CM

## 2024-01-10 LAB
ALBUMIN SERPL BCP-MCNC: 2.8 G/DL (ref 3.5–5.2)
ALP SERPL-CCNC: 240 U/L (ref 55–135)
ALT SERPL W/O P-5'-P-CCNC: 46 U/L (ref 10–44)
ANION GAP SERPL CALC-SCNC: 7 MMOL/L (ref 8–16)
AST SERPL-CCNC: 34 U/L (ref 10–40)
BASOPHILS # BLD AUTO: 0.03 K/UL (ref 0–0.2)
BASOPHILS NFR BLD: 0.4 % (ref 0–1.9)
BILIRUB DIRECT SERPL-MCNC: 0.1 MG/DL (ref 0.1–0.3)
BILIRUB SERPL-MCNC: 0.2 MG/DL (ref 0.1–1)
BUN SERPL-MCNC: 8 MG/DL (ref 6–20)
CALCIUM SERPL-MCNC: 8.8 MG/DL (ref 8.7–10.5)
CHLORIDE SERPL-SCNC: 110 MMOL/L (ref 95–110)
CO2 SERPL-SCNC: 23 MMOL/L (ref 23–29)
CREAT SERPL-MCNC: 0.7 MG/DL (ref 0.5–1.4)
DIFFERENTIAL METHOD BLD: ABNORMAL
EOSINOPHIL # BLD AUTO: 0.2 K/UL (ref 0–0.5)
EOSINOPHIL NFR BLD: 2.1 % (ref 0–8)
ERYTHROCYTE [DISTWIDTH] IN BLOOD BY AUTOMATED COUNT: 13.8 % (ref 11.5–14.5)
EST. GFR  (NO RACE VARIABLE): >60 ML/MIN/1.73 M^2
GLUCOSE SERPL-MCNC: 93 MG/DL (ref 70–110)
HCT VFR BLD AUTO: 32.2 % (ref 37–48.5)
HGB BLD-MCNC: 10.1 G/DL (ref 12–16)
IGA SERPL-MCNC: 343 MG/DL (ref 40–350)
IGG SERPL-MCNC: 1121 MG/DL (ref 650–1600)
IGM SERPL-MCNC: 91 MG/DL (ref 50–300)
IMM GRANULOCYTES # BLD AUTO: 0.02 K/UL (ref 0–0.04)
IMM GRANULOCYTES NFR BLD AUTO: 0.3 % (ref 0–0.5)
INR PPP: 1 (ref 0.8–1.2)
LYMPHOCYTES # BLD AUTO: 1.9 K/UL (ref 1–4.8)
LYMPHOCYTES NFR BLD: 26.7 % (ref 18–48)
MCH RBC QN AUTO: 28.9 PG (ref 27–31)
MCHC RBC AUTO-ENTMCNC: 31.4 G/DL (ref 32–36)
MCV RBC AUTO: 92 FL (ref 82–98)
MONOCYTES # BLD AUTO: 0.8 K/UL (ref 0.3–1)
MONOCYTES NFR BLD: 11.4 % (ref 4–15)
NEUTROPHILS # BLD AUTO: 4.1 K/UL (ref 1.8–7.7)
NEUTROPHILS NFR BLD: 59.1 % (ref 38–73)
NRBC BLD-RTO: 0 /100 WBC
PLATELET # BLD AUTO: 366 K/UL (ref 150–450)
PMV BLD AUTO: 9.7 FL (ref 9.2–12.9)
POTASSIUM SERPL-SCNC: 3.7 MMOL/L (ref 3.5–5.1)
PROT SERPL-MCNC: 6.9 G/DL (ref 6–8.4)
PROTHROMBIN TIME: 10.9 SEC (ref 9–12.5)
RBC # BLD AUTO: 3.5 M/UL (ref 4–5.4)
SODIUM SERPL-SCNC: 140 MMOL/L (ref 136–145)
WBC # BLD AUTO: 7.01 K/UL (ref 3.9–12.7)

## 2024-01-10 PROCEDURE — 82784 ASSAY IGA/IGD/IGG/IGM EACH: CPT | Mod: 59 | Performed by: INTERNAL MEDICINE

## 2024-01-10 PROCEDURE — 80048 BASIC METABOLIC PNL TOTAL CA: CPT | Performed by: INTERNAL MEDICINE

## 2024-01-10 PROCEDURE — 99214 OFFICE O/P EST MOD 30 MIN: CPT | Mod: S$GLB,,, | Performed by: INTERNAL MEDICINE

## 2024-01-10 PROCEDURE — 3044F HG A1C LEVEL LT 7.0%: CPT | Mod: CPTII,S$GLB,, | Performed by: INTERNAL MEDICINE

## 2024-01-10 PROCEDURE — 1159F MED LIST DOCD IN RCRD: CPT | Mod: CPTII,S$GLB,, | Performed by: INTERNAL MEDICINE

## 2024-01-10 PROCEDURE — 3078F DIAST BP <80 MM HG: CPT | Mod: CPTII,S$GLB,, | Performed by: INTERNAL MEDICINE

## 2024-01-10 PROCEDURE — 3008F BODY MASS INDEX DOCD: CPT | Mod: CPTII,S$GLB,, | Performed by: INTERNAL MEDICINE

## 2024-01-10 PROCEDURE — 1111F DSCHRG MED/CURRENT MED MERGE: CPT | Mod: CPTII,S$GLB,, | Performed by: INTERNAL MEDICINE

## 2024-01-10 PROCEDURE — 97112 NEUROMUSCULAR REEDUCATION: CPT | Mod: CQ

## 2024-01-10 PROCEDURE — 85025 COMPLETE CBC W/AUTO DIFF WBC: CPT | Performed by: INTERNAL MEDICINE

## 2024-01-10 PROCEDURE — 97110 THERAPEUTIC EXERCISES: CPT | Mod: CQ

## 2024-01-10 PROCEDURE — 80076 HEPATIC FUNCTION PANEL: CPT | Performed by: INTERNAL MEDICINE

## 2024-01-10 PROCEDURE — 3074F SYST BP LT 130 MM HG: CPT | Mod: CPTII,S$GLB,, | Performed by: INTERNAL MEDICINE

## 2024-01-10 PROCEDURE — 85610 PROTHROMBIN TIME: CPT | Performed by: INTERNAL MEDICINE

## 2024-01-10 PROCEDURE — 97140 MANUAL THERAPY 1/> REGIONS: CPT | Mod: CQ

## 2024-01-10 PROCEDURE — 99999 PR PBB SHADOW E&M-EST. PATIENT-LVL V: CPT | Mod: PBBFAC,,, | Performed by: INTERNAL MEDICINE

## 2024-01-10 PROCEDURE — 86036 ANCA SCREEN EACH ANTIBODY: CPT | Mod: 59 | Performed by: INTERNAL MEDICINE

## 2024-01-10 PROCEDURE — 1160F RVW MEDS BY RX/DR IN RCRD: CPT | Mod: CPTII,S$GLB,, | Performed by: INTERNAL MEDICINE

## 2024-01-10 NOTE — PROGRESS NOTES
Hepatology Consult Note    Referring provider: Naomy Gary  PCP: Luis Madden DO    Chief complaint: abnormal liver enzymes     HPI:  Ivy Salgado is a 43 y.o. female with Crohn's disease s/p total proctocolectomy and end ileostomy (2012), who was referred to Hepatology Clinic for abnormal liver enzymes. She is accompanied by her father, Milan.     Per chart review, the patient was recently admitted to [facility] (12/31/23 - 1/7/24) for worsening abdominal pain and concern for Crohn's flare. During admission, the patient was evaluated by Hepatology consult team for elevated liver enzymes. Extensive serological workup was unrevealing, and MRI abd/ MRCP was unrevealing for etiology of liver enzymes abnormality.    Regarding risk factors of liver disease, the patient denies prior history of EtOH abuse, illicit drug use. Denies blood transfusions prior to 1990s. Prior tattoos done professionally, last many years ago. Denies personal history of DM, HTN, HLD, PRETTY, PCOS. Reports father has been diagnosed with cholangitis related to prior chemotherapy. Denies other family history of liver disease / cirrhosis / PSC. Reports many members of her family have been diagnosed with Crohn's disease, including her daughter.    OTC medications includes multivitamin. Denies herbal supplements.    The patient denies signs/symptoms of decompensated liver disease, including no recent abdominal distension, jaundice. The patient denies prior evaluation by hepatologist, liver biopsy, paracentesis.    Reports many abdominal surgeries for Crohn's disease. Denies prior hepatobiliary surgeries.    Past Medical History:   Diagnosis Date    Abnormal Pap smear 2007    Abnormal Pap smear 5/26/2011    Anemia     Anxiety     Arthritis     C. difficile diarrhea     Crohn's disease     Depression 8/5/2017    Encounter for blood transfusion     Genital HSV     History of colposcopy with cervical biopsy 2007 and 7/2011 2007-LYLA I  and  7/2011- LYLA I    Hypertension     Kidney stone     Kidney stone     Melanoma     Recurrent UTI 4/3/2013    S/P ileostomy 7/9/2012    Sterilization 6/23/2012       Past Surgical History:   Procedure Laterality Date    ABDOMINAL SURGERY      APPENDECTOMY      ARTHROPLASTY OF SHOULDER Left 7/18/2023    Procedure: ARTHROPLASTY, SHOULDER;  Surgeon: Sharon Babb MD;  Location: [facility];  Service: Orthopedics;  Laterality: Left;    AUGMENTATION OF BREAST Bilateral 06/2022    gel implants    BILATERAL SALPINGO-OOPHORECTOMY (BSO) Bilateral 05/30/2019    Procedure: SALPINGO-OOPHORECTOMY, BILATERAL;  Surgeon: Rupa German MD;  Location: [address];  Service: OB/GYN;  Laterality: Bilateral;    BLADDER SURGERY      partial cystectomy due to fistula    breast lift      BREAST SURGERY      [facility]      COLON SURGERY      COLONOSCOPY      CYSTOSCOPY  09/23/2020    Procedure: CYSTOSCOPY;  Surgeon: Sascha Florentino MD;  Location: [facility] OR [address] [place] [address];  Service: Urology;;    CYSTOSCOPY W/ URETERAL STENT PLACEMENT Left 09/15/2020    Procedure: CYSTOSCOPY, WITH URETERAL STENT INSERTION;  Surgeon: Sascha Florentino MD;  Location: [address];  Service: Urology;  Laterality: Left;    DIAGNOSTIC LAPAROSCOPY N/A 07/09/2020    Procedure: LAPAROSCOPY, DIAGNOSTIC;  Surgeon: Gilson El MD;  Location: [facility];  Service: OB/GYN;  Laterality: N/A;    ESOPHAGOGASTRODUODENOSCOPY N/A 1/3/2024    Procedure: EGD (ESOPHAGOGASTRODUODENOSCOPY);  Surgeon: Lily Lind MD;  Location: [facility] (2ND FLR);  Service: Endoscopy;  Laterality: N/A;    EXCISION OF MELANOMA  07/17/2019    ILEOSCOPY N/A 1/3/2024    Procedure: ILEOSCOPY;  Surgeon: Lily Lind MD;  Location: [facility] ENDO (2ND FLR);  Service: Endoscopy;  Laterality: N/A;    ILEOSTOMY      LAPAROSCOPIC LYSIS OF ADHESIONS N/A 07/09/2020    Procedure: LYSIS, ADHESIONS, LAPAROSCOPIC;  Surgeon: Gilson El MD;  Location: [facility] OR;  Service: OB/GYN;  Laterality: N/A;    LASER  LITHOTRIPSY  09/23/2020    Procedure: LITHOTRIPSY, USING LASER;  Surgeon: Sascha Florentino MD;  Location: [facility] OR 1ST FLR;  Service: Urology;;    LYSIS OF ADHESIONS N/A 05/30/2019    Procedure: LYSIS, ADHESIONS;  Surgeon: Rupa German MD;  Location: [facility] OR 2ND FLR;  Service: OB/GYN;  Laterality: N/A;    OOPHORECTOMY Right 04/16/2015    PORTACATH PLACEMENT  02/21/2017    SKIN BIOPSY      SMALL INTESTINE SURGERY      age 18 Y    TOTAL ABDOMINAL HYSTERECTOMY  04/16/2015    TOTAL COLECTOMY      TUBAL LIGATION  06/06/2012    UPPER GASTROINTESTINAL ENDOSCOPY      URETEROSCOPIC REMOVAL OF URETERIC CALCULUS  09/23/2020    Procedure: REMOVAL, CALCULUS, URETER, URETEROSCOPIC;  Surgeon: Sascha Florentino MD;  Location: [facility] OR 1ST FLR;  Service: Urology;;    URETEROSCOPY Left 09/23/2020    Procedure: URETEROSCOPY;  Surgeon: Sascha Florentino MD;  Location: [facility] OR 1ST FLR;  Service: Urology;  Laterality: Left;       Family History   Problem Relation Age of Onset    Diabetes Paternal Grandfather     Hearing loss Paternal Grandmother     Cancer Maternal Grandfather         Skin    Skin cancer Maternal Grandfather     Diabetes Maternal Grandfather     Heart disease Maternal Grandfather     Colon cancer Father     Cancer Father         Colon    Liver cancer Father     Hyperlipidemia Father     Hypertension Mother     Crohn's disease Brother     Endometrial cancer Maternal Aunt     Breast cancer Maternal Cousin 41    Crohn's disease Daughter     Ovarian cancer Neg Hx     Melanoma Neg Hx        Social History     Tobacco Use    Smoking status: Never    Smokeless tobacco: Never   Substance Use Topics    Alcohol use: Not Currently     Alcohol/week: 0.0 standard drinks of alcohol    Drug use: No       Current Outpatient Medications   Medication Sig Dispense Refill    clonazePAM (KLONOPIN) 1 MG tablet Take 1 tablet (1 mg total) by mouth 2 (two) times daily. 60 tablet 2    cyclobenzaprine (FLEXERIL) 10 MG tablet Take 1  tablet (10 mg total) by mouth every evening as needed for muscle pain 15 tablet 0    droNABinol (MARINOL) 5 MG capsule Take 1 capsule (5 mg total) by mouth 2 (two) times daily before meals. 60 capsule 1    estradioL (ESTRACE) 0.01 % (0.1 mg/gram) vaginal cream Place 1 g vaginally once daily. 30 g 12    flu vacc [id number] 6mos up,PF, (FLUARIX QUAD 4196-1510, PF,) 60 mcg (15 mcg x 4)/0.5 mL Syrg inject into muscle for one dose 0.5 mL 0    fluconazole (DIFLUCAN) 150 MG Tab Take 1 tablet (150 mg total) by mouth every 72 hours as needed (vaginal yeast). 3 tablet 0    gabapentin (NEURONTIN) 300 MG capsule Take 1 capsule (300 mg total) by mouth 2 (two) times daily. 60 capsule 11    HYDROcodone-acetaminophen (NORCO) 7.5-325 mg per tablet Take 1 tablet by mouth every 4 (four) hours as needed for Pain. 42 tablet 0    loperamide (IMODIUM) 2 mg capsule Take 1 capsule (2 mg total) by mouth 4 (four) times daily as needed for Diarrhea. 40 capsule 0    mirtazapine (REMERON SOL-TAB) 30 MG disintegrating tablet Take 1 tablet (30 mg total) by mouth nightly. 30 tablet 0    multivitamin (THERAGRAN) per tablet Take 1 tablet by mouth once daily.      nadoloL (CORGARD) 40 MG tablet Take 1 tablet (40 mg total) by mouth once daily. Patient needs appointment before next refill. 30 tablet 0    pantoprazole (PROTONIX) 40 MG tablet Take 1 tablet (40 mg total) by mouth 2 (two) times daily. 60 tablet 12    promethazine (PHENERGAN) 25 MG tablet Take 1 tablet (25 mg total) by mouth every 6 (six) hours as needed for Nausea. 40 tablet 0    triamcinolone acetonide 0.1% (KENALOG) 0.1 % cream Apply topically 2 (two) times daily.      valACYclovir (VALTREX) 500 MG tablet Take 1 tablet (500 mg total) by mouth once daily. 90 tablet 3    vedolizumab (ENTYVIO) 300 mg SolR injection Inject 300 mg into the vein.      zolpidem (AMBIEN) 10 mg Tab Take 1 tablet (10 mg total) by mouth every evening. 30 tablet 2    tamsulosin (FLOMAX) 0.4 mg Cap Take 1 capsule (0.4  "mg total) by mouth once daily. 30 capsule 1     Current Facility-Administered Medications   Medication Dose Route Frequency Provider Last Rate Last Admin    estradiol valerate (DELESTROGEN) injection 20 mg/mL  20 mg Intramuscular Q30 Days Gilson El MD   20 mg at 12/30/22 0902    estradiol valerate injection 20 mg  20 mg Intramuscular Q30 Days Gilson El MD   20 mg at 05/11/23 1327       Review of patient's allergies indicates:   Allergen Reactions    Azathioprine sodium Other (See Comments)     Other reaction(s): pancreatitis  Other reaction(s): pancreatitis    Methotrexate analogues Other (See Comments)     leukopenia    Stelara [ustekinumab] Other (See Comments)     Multiple infections    Zofran [ondansetron hcl (pf)] Other (See Comments)     Per patient causes prolong QT    Vancomycin analogues Other (See Comments)     Made her red    Azathioprine      Other reaction(s): Unknown    Methotrexate      Other reaction(s): infection-    Morphine Itching and Other (See Comments)     Other reaction(s): Itching    Zofran [ondansetron hcl]      Other reaction(s): Hives    Bactrim [sulfamethoxazole-trimethoprim] Palpitations    Ciprofloxacin Palpitations            Vitals:    01/10/24 0841   BP: (!) 107/53   Pulse: 73   Resp: 18   Temp: 97 °F (36.1 °C)   TempSrc: Oral   SpO2: 97%   Weight: 51.3 kg (113 lb 1.5 oz)   Height: 5' 1" (1.549 m)   Body mass index is 21.37 kg/m².    Physical Exam  Constitutional:       General: She is not in acute distress.  Eyes:      General: No scleral icterus.  Cardiovascular:      Rate and Rhythm: Normal rate.   Pulmonary:      Effort: Pulmonary effort is normal.   Abdominal:      General: The ostomy site is clean. There is no distension.      Palpations: Abdomen is soft. There is no fluid wave, hepatomegaly or splenomegaly.      Tenderness: There is generalized abdominal tenderness.   Musculoskeletal:      Right lower leg: No edema.      Left lower leg: No edema.   Skin:     " General: Skin is warm.      Comments: No spider angiomas. No palmar erythema. No leukonychia.    Neurological:      General: No focal deficit present.      Mental Status: She is alert and oriented to person, place, and time.   Psychiatric:         Behavior: Behavior is cooperative.         Thought Content: Thought content normal.         LABS: I personally reviewed pertinent laboratory findings.    Lab Results   Component Value Date    WBC 5.87 01/06/2024    HGB 10.8 (L) 01/06/2024    HCT 33.4 (L) 01/06/2024    MCV 92 01/06/2024     01/06/2024       Lab Results   Component Value Date     01/07/2024    K 3.8 01/07/2024     01/07/2024    CO2 26 01/07/2024    BUN 8 01/07/2024    CREATININE 0.7 01/07/2024    CALCIUM 8.8 01/07/2024    ANIONGAP 10 01/07/2024    ESTGFRAFRICA >60.0 03/24/2022    EGFRNONAA >60.0 03/24/2022       Lab Results   Component Value Date    ALT 57 (H) 01/07/2024    AST 38 01/07/2024    ALKPHOS 244 (H) 01/07/2024    BILITOT 0.2 01/07/2024       Lab Results   Component Value Date    HEPAIGM Non-reactive 09/19/2023    HEPBIGM Non-reactive 09/19/2023    HEPBCAB Non-reactive 01/02/2024    HEPCAB Non-reactive 09/19/2023       Lab Results   Component Value Date    SMOOTHMUSCAB Negative 1:40 01/02/2024    CERULOPLSM 40.0 01/04/2024        MELD 3.0: 9 at 1/7/2024  5:47 AM  MELD-Na: 6 at 1/7/2024  5:47 AM  Calculated from:  Serum Creatinine: 0.7 mg/dL (Using min of 1 mg/dL) at 1/7/2024  5:47 AM  Serum Sodium: 141 mmol/L (Using max of 137 mmol/L) at 1/7/2024  5:47 AM  Total Bilirubin: 0.2 mg/dL (Using min of 1 mg/dL) at 1/7/2024  5:47 AM  Serum Albumin: 2.8 g/dL at 1/7/2024  5:47 AM  INR(ratio): 1.0 at 1/7/2024  5:47 AM  Age at listing (hypothetical): 43 years  Sex: Female at 1/7/2024  5:47 AM       IMAGING: I personally reviewed imaging studies.    MRI Abdomen w/wo contrast 1/4/2024  FINDINGS:  Inferior thorax: Bilateral breast implants.     Liver: Mildly enlarged in size.  Normal  homogeneous background signal.  No focal lesions.  Hepatic and portal veins are patent.     Biliary: Gallbladder is within normal limits.  Mildly prominent proximal common duct which tapers normally distally.  No choledocholithiasis or obstructing lesion.     Pancreas: No solid lesion.  Punctate cyst measuring 3 mm in the pancreatic tail appears to communicate with the main pancreatic duct, likely an IPMN (axial series 18, image 39).  Too small to characterize although no suspicious nodular enhancement.  No pancreatic ductal dilatation.     Spleen: Normal size.  No focal lesions.     Adrenal glands: Unremarkable.     Kidneys: Small bilateral renal cysts.  No renal masses.  No hydronephrosis.     Miscellaneous: Surgical changes of the bowel with a right lower quadrant ostomy.  Dystrophic calcifications in the right gluteal subcutaneous soft tissues.  No evidence for lymphadenopathy.     Impression:     No evidence of biliary obstruction.     Punctate cystic focus in the pancreatic tail probably communicating with the main pancreatic duct, likely a side branch IPMN.  Too small to characterize although no worrisome features.  Follow-up in 1 year is recommended.    Assessment:    Ivy Salgado is a 43 y.o. female with Crohn's disease s/p total proctocolectomy and end ileostomy (2012), who was referred to Hepatology Clinic for abnormal liver enzymes. Per chart review, liver enzymes have been persistently elevated in a cholestatic pattern (ALP ~2x ULN) since Dec 2023. Prior to recent admission, liver enzymes had been unremarkable, except for few intermittent elevation of ALP many years back. Bilirubin, INR, and platelet count are normal. Hypoalbuminemia is noted. Workup is notable for negative autoimmune serologies, and negative viral hepatitis. On MRI abdomen, the liver is enlarged with homogenous background signal, biliary tree is unremarkable, spleen is normal.     Etiology of liver enzymes is unclear at this time.  Differential is broad and includes DILI, infectious etiologies, autoimmune liver disease. Will complete chronic liver disease workup, and trend liver enzymes. If enzymes abnormality persists, then will consider liver biopsy.    1. Elevated liver function tests    2. Crohn's disease of small intestine with other complication    3. Need for hepatitis A vaccination      Recommendations:  - BMP, CBC, LFTs, INR  - ANCA, immunoglobulins   - Consider liver biopsy  - Recommend HAV vaccination     Return to clinic in 3 months.    I have sent communication to the referring physician and/or primary care provider.    Natali Soto MD  Staff Physician  Hepatology and Liver Transplant  [facility]  [facility]

## 2024-01-10 NOTE — PROGRESS NOTES
[facility]   Physical Therapy Treatment Note     Name: Ivy Salgado  Clinic Number: [id number]    Therapy Diagnosis:   Encounter Diagnosis   Name Primary?    Decreased range of motion of left shoulder Yes       Physician: Luis Madden DO    Visit Date: 1/10/2024     Evaluation Date: 8/8/2023  Authorization Period Expiration: 7/6/2024  Plan of Care Expiration: 12/31/2023  Progress Note Due: 9/10/2023  Visit # / Visits authorized: 35/ 40  Foto: 2/3      Time In: 16:25  Time Out: 1745  Total Billable Time: 80 minutes     DOS: 7/18/2023  S/p: left  1. Total shoulder arthroplasty (complex, -22 modifier)  2. Shoulder lysis of adhesions  3. Shoulder subpectoral biceps tenodesis (CPT 14173)  Post-Op Precautions: We will follow the shoulder arthroplasty guidelines. The patient remained in a sling for 6 weeks. We will start PT at the 3-week martina.       Precautions: none    SUBJECTIVE     Pt reports: still not feeling well. Had scope on intestines and have an infection. Something is wrong with my liver as well. Have neglected my shoulder    She was compliant with home exercise program.  Response to previous treatment: improvement in pain at rest   Functional change: able to ttol reaching overhead a bit better    Pain: 3/10  Location: left shoulder      OBJECTIVE     Cervical range: pain with right rotation and right SB with symptoms down the left arm.     Shoulder Flexion Pre 110, post 140         Treatment     Tech used to A with treatment     Ivy received the treatments listed below:      Therapeutic exercises to develop ROM for 20 minutes including:  UBE 4m/4m lvl 3 unilteral for endurance  Thoracic rotations 15x B   Lat flexibility with weight flexion supine 2# 5s holds 20x     Manual therapy techniques: Joint mobilizations and Soft tissue Mobilization were applied to the: L shoulder for 20 minutes, including:  Skilled PROM of L shoulder within tolerance and protocol in flexion/ER/IR,  elbow flex/ext, wrist sup/pronation  UT Taping with Leukotape      NP  Left lateral side glide mid cervical  Supine CT junction flick mobilizations  Prone CT junction mobilizations - recreation of symptoms down arm  Grade II mobs GHJ     Neuromuscular re-education activities to improve: motor control for 40 min    Supine IR/ER manual isometrics w/ PTA @ 90   Rhythmic stab FL supine @ 90  UT level 1 3x10  Resisted ER 15x 5s holds otb   Resisted IR gtb 3x15    NP  Supine flex with 2# dowel 3x10  Scaption 4x8  0#  Sidelying ABD 1# 3x10  Sidelying flex with dowel with UT shrug 4x4-6 reps  Sidelying serratus press with dowel and PT assistance 4x6  Patient Education and Home Exercises     Home Exercises Provided and Patient Education Provided     Education provided:   - Add new exercises     Written Home Exercises Provided: yes. Exercises were reviewed and Ivy was able to demonstrate them prior to the end of the session.  Ivy demonstrated good  understanding of the education provided. See EMR under Patient Instructions for exercises provided during therapy sessions    ASSESSMENT     Ivy still limited with progressing 2* other medical issues. Neck pain has subsided but still getting neural tension. Tapping UT does help. Pt still prevents with depressed L shoulder. Challenged with rhythmic stab at 90 degrees FL 2* strength.     Ivy Is progressing well towards her goals.     Pt prognosis is Fair.     Pt will continue to benefit from skilled outpatient physical therapy to address the deficits listed in the problem list box on initial evaluation, provide pt/family education and to maximize pt's level of independence in the home and community environment.     Pt's spiritual, cultural and educational needs considered and pt agreeable to plan of care and goals.     Anticipated barriers to physical therapy: none     Goals:   Short Term Goals: 12 weeks  1. Pt will be compliant with HEP 50% of prescribed amount.   2. The  pt to demo improvement in R shoulder PROM to by 80% of the L  3.  The pt to demo tolerance to being out of the sling for 24 hours with pain <2/10     Long Term Goals:  36 weeks   Pt will be compliant with % of prescribed amount.   The pt to improvement in AROM of L shoulder within 80% of R shoulder to improve tolerance to OH movement   The pt to  Demo at least 4+/5 of RTC muscle testing to demo improvement in tolerance to activity  The pt to tolerate lifting 50# from the ground to waist height per job requirement description   The pt will report full participation in ADLs and IADLs without restrictions related to L Shoulder.      PLAN     Follow TSA procedure     Vidya Lemus PTA,

## 2024-01-11 ENCOUNTER — TELEPHONE (OUTPATIENT)
Dept: GASTROENTEROLOGY | Facility: CLINIC | Age: 44
End: 2024-01-11
Payer: COMMERCIAL

## 2024-01-11 ENCOUNTER — PATIENT MESSAGE (OUTPATIENT)
Dept: INTERNAL MEDICINE | Facility: CLINIC | Age: 44
End: 2024-01-11

## 2024-01-11 NOTE — TELEPHONE ENCOUNTER
"1129 Pt called office again to report increased pain, message in basket  1226. Writer MACIEL on VM   1315 Pt returned call, message placed in basket  4499 - Spoke with Ivy:  - reports stool is "running out of me"  - cannot estimate how many bags she's gone thru  - reports dizziness/lightheadedness with standing  - unable to eat or drink anything  - reports severe abdominal pain  - denies lit blood in ostomy but reports red flakes  - stool looks like urine per pt report  - dc'd from hospital 1/7/24    Advised pt immediately report to ED for evaluation. She voices frustration with length of time to speak with office staff.     Dr. Garcia will be updated.   "

## 2024-01-11 NOTE — TELEPHONE ENCOUNTER
----- Message from Mariangel Cochran sent at 1/11/2024  8:06 AM CST -----  Regarding: pt advice  Contact: [phone]  Pt is calling to speak with Parul in provider office in regards to informing the nurse that her symptoms have intensified pt stated she has had to take three showers and four ostomy bag changes since 4am she states bowel has been pouring out of her pt  is asking for a return call please call pt at   [phone]

## 2024-01-12 ENCOUNTER — TELEPHONE (OUTPATIENT)
Dept: GASTROENTEROLOGY | Facility: HOSPITAL | Age: 44
End: 2024-01-12
Payer: COMMERCIAL

## 2024-01-12 DIAGNOSIS — R19.7 DIARRHEA, UNSPECIFIED TYPE: Primary | ICD-10-CM

## 2024-01-12 LAB
ANCA AB TITR SER IF: NORMAL TITER
P-ANCA TITR SER IF: NORMAL TITER

## 2024-01-12 NOTE — PROGRESS NOTES
[facility]          Inflammatory Bowel Disease          New Patient Note         TODAY'S VISIT DATE:  1/12/2024    Reason for Consult:    Chief Complaint   Patient presents with    Crohn's Disease     PCP: Luis Madden      Referring MD:   Aaareferral Self    History of Present Illness:  Ivy Salgado who is a 43 y.o. female seen today at the [facility] for Crohn's disease.  Other pertinent medical conditions include recurrent C diff (2014 x 2), ARPITA/depression, arthritis, h/o abnormal pap smear- LYLA I, nephrolithiasis-nonobstructive, GERD, long QTc syndrome (Type III, on nadolol), s/p SHELLIE (2015) for recurrent ovarian cysts and endometriosis, early evolving melanoma in situ (buttocks and para-stomal site, excised in 2018 and 2019 respectively), history of genital HSV, imuran induced pancreatitis, pancreatic tail cyst, recurrent UTI (h/o ESBL 2018), multiple thyroid nodules, osteopenia, left femoral head chronic avascular necrosis, history bacterial vaginosis, h/o abnormal LFTs (elevated ALP since 2016), family history of colon cancer.  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  She tried multiple meds including imuran (pancreatitis, remicade (secondary nonresponder), humira (DIL), MTX (leukopenia).  She met Dr. Cameron initially in GI clinic at [facility] 5/2009 at which time she had some diarrhea and on pred 30 mg/d. Colonoscopy 6/2009 significant for diffuse proctosigmoiditis with remainder of colon normal and end to end ileocolonic anastomsis with inflammation. Placed on rowasa enemas but too expensive so switched to steroid enemas.  Flex sig 10/2009 ongoing proctosigmoiditis.  In 2010 she had rectal bleeding that improved with proctofoam and started cimzia with improvement of symptoms.  In 4/2010 she had CT  showing circumferential bowel wall thickening of the distal descending and sigmoid with small caliber origin of celiac artery and referred to vascular for median arcuate ligament syndrome.  Colonoscopy c/w moderate proctosigmoiditis and in 5/2010 she continued proctofoam BID and took extra dose of cimzia to induce remission.  In 7/2010 flex sig confirmed active left sided colitis and she started prednisone in 3/2011 with repeat colonoscopy 8/2011 c/w ongoing rectosigmoid inflammation with mild mucosal ulcer the transverse colon with ileocolonic anastomotic stricture and normal TI. In 9/2011 pt was on canasa, cimzia and prednisone taper.  In April/May 2012 pt hospitalized and CT c/w sigmoid wall thickening and colonoscopy confirmed distal 30 cm with moderate inflammation with ileocolonic anastomotic ulcers and normal TI.  In 6/2012 Dr Parsons proceeded with completion proctocolectomy with end ileostomy with pathology confirming transmural active inflammation with abscess c/w Crohn's disease.  Post-operatively she had peristomal ulcer and perineal wounds. In 1/2013 patient had non-healing perineal wound S/P debridement  with steroids and treated with cipro. In 8/2013 she had EUA with debridement and fulguration of non-healing perineal wound.  In 8/2013 pt had epigastric abd pain and EGD c/w benign gastric polyp, labs normal, CT c/w short segment WB thickening involving ileum proximal to the stoma and resolution of right adnexal mass.  In 9/2013 ileoscopy through stoma significant for segment mucosa at 5 cm proximal to stoma mild congested, eroded, ulcerated area of 6-30 cm and mucosal distal is normal. CT A/p 1/2024 c/w 2 separate segments of SB proximal to the ostomy and near staple row in RLQ with mild enhancement and wall thickening c/w mild inflammatory changes and segment of bowel forming ostomy.  SB enteroscopy 2/2014 significant for single 2 mm ulcer in the proximal ileum.  In 2014 she had 2 hospitalizations  for high ileostomy output with was treated with prednisone with taper.  In 10/2014 CT showed few mild dilated loops SB in anterior right pelvis and loop with surgical suture line abuts anterior pelvic wall and possible adhesion. In 10/2014 ileoscopy through stomal normal.  In 1/2015 pt had focal segment of mild distal SB dilation and C diff positive and pt treated antibiotics followed by probiotics and resumed cimzia. Dr. Cameron then referred patient to Dr. Parish Ross in 2015 and he mentions recurrent C diff, recurrent SBOs likely related to adhesions. In 2015 she had SHELLIE/BSO with bladder repair and due to this missed one dose of cimzia and then resumed 5/2015. In 1/2016 she had partial SBO due to adhesions and UGI/SBFT and CT did not show fixed obstruction. In 10/2016 she reported to Dr. Ross postprandial nausea and epigastric pain, weight loss, fatigue, low grade fever and watery stool output and prednisone helped symptoms but not as good response as past. She was off of cimzia 8-9/2016 though sx occurred prior to holding cimzia. Ileoscopy through stoma 10/2016 significant for normal 15 cm of ileum examined. Overall Dr. Ross felt that her symptoms responded well to prednisone and restarting cimzia. She presented to her OV 1/2017 with increased ileostomy output with C diff negative with symptoms ongoing and MRE 5/2017 c/w long segment of diffuse wall thickening and mucosal enhancement involving the distal ileum. CT A/P 6/2017 significant for left ovarian cyst, mild biliary dilation stable, hypodensities and punctate bilateral renal calcifications.  Ileoscopy through stoma 8/2017 c/w normal ileum from stoma to 15-20 cm. CT A/P 11/2017 significant for righ adnexal mass abutting theright external iliac vein and pelvic US recommended, bilateral renal hypodensities and calcifications. MRE 11/2017 c/w mild questionable ileal inflammation and luminal narrowing involving short segment of SB within the right hemipelvis  with mild dilation and upstream bowel 2.5 cm with findings concerning for developing stricture. She stopped cimzia 6/2017 and started stelara 7/11/2017 though discontinued in 1/2018 due to rash.  Due to symptoms pt was started in 1/2018 on prednisone 10 mg/d, bentyl. In 2/2018 CT A/P c/w bilateral nonobstructing renal calculi, mild bilateral cortical scarring of lower renal poles. MRE 9/2018 significant for short segment of distal ileum with scattered regions of non uniform wall thickening and possible additional short segment of proximal jejunum with mild wall thickening. In fall 2018 she was placed on entocort though due to fast transit was opening capsule and taking this. CT A/P 12/2018 c/w several bilateral renal hypodensities c/w cysts, nonobstructive bilateral nephrolithiasis, right adnexal complex cyst. She started entyvio 11/20/18 and due to recurrent UTIs and palpitations (dx QT prolongation and started on beta blocker) so discontinued in 10/27/20. MRE 5/15/19 significant for right adnexal cystic lesion, large left adnexal cystic lesion.  On 5/30/19 patient had exploratory laparotomy with lysis of adhesions, BSO/mass resection. CT A/P 6/2019 significant for nonspecific rim enhancing fluid collection, mild left hydroureteronephrosis. In 2019 there were several mos she held entyvio due to gyn and melanoma surgery. MRE 5/2020 significant for small non enhancing fluid collection within presacral region superior to the vaginal cuff, right sided pelvocaliectasis, bilateral cortical renal cysts, left femoral head chronic avascular necrosis. CT A/P 9/2020 c/w mild left hydrouriteronephrosis due to distal left ureter stone with ass urothelial thickening and enhancement.  MRE 4/2021 right ovarian cyst likely hemorrhagic cyst.  In 1/2022 patient was placed by Dr. Ross on pantoprazole 40 mg BID. For joint pains and chronic abdominal pain Dr. Ross treated her with gabapentin for several years. In 1/2023 she had  multiple intrarenal stones.  Repeat CT 10/2023 small non-obstructing renal stones. She was hospitalized 12/31/23-1/7/24 for high ileostomy output with stool studies neg for infection.  Elevated inflammatory markers (ESR 94, CRP 22.8), stool caprotectin 281. CT A/P showed slow flow through few mid to distal SB loops noting venous vascular engorgement about these loops and wall hyperemia. On 1/3/24 she had EGD c/w mild HP neg gastritis and ileoscopy through stoma with about 6 apthous ulcers in the distal 20 cm of the ileum and biopsies c/w active inflammation.  Pt had elevated LFTs (peaked 1/4/24 ///TB normal).  Hepatology consulted and serological workup negative and subsequent LFTs normalized with no intervention. Abdominal US revealed mildly dilated bile ducts in the central right hepatic lobe and MRCP c/w no evidence of biliary obstruction, punctate cystic focus in the pancreas tail likely side IPMN and f/u in 1 year recommended.  She was treated with antidiarrheals (imodium helped but lomotil caused palpitations) and IVFs. Lotronex was being considered but due to prolonged QT unable to proceed with this. Due to mild ileitis plan for outpatient entyvio.  Since her discharge from hospital she had continued high ileostomy output and dehydration and we recommended that she return for hospitalization but pt did not wish to go to ER.  She continued with high ileostomy output up to 8 liters/day but if fasting down to 4 liters/day despiate taking imodium 1 tab QID.  She is not taking lomotil due to palpitations.      Prior Pertinent Surgeries:   surgery with SB and colon resection due to entero-vesicular fistula with abdominal abscess in 1992, 1998 6/2012 (Dr Parsons):  completion proctocolectomy with end ileostomy with pathology confirming transmural active inflammation with abscess c/w Crohn's disease  1/2013 EUA:  debridement of non-healing perineal wound  8/2013 EUA: debridement and fulguration  "of non-healing perineal wound.      Last pertinent Endoscopy/Imagin23 CT A/P slow flow through few mid to distal SB loops noting venous vascular engorgement about these loops and wall hyperemia  1/3/24 EGD: normal except for gastric erythema, bx c/w mild HP neg gastritis   1/3/24 ileoscopy through stoma:  6 apthous ulcers in the distal 20 cm of the ileum and biopsies c/w active inflammation  1/3/24 abd US:  mildly dilated bile ducts in the central right hepatic lobe   24 MRCP c/w no evidence of biliary obstruction, punctate cystic focus in the pancreas tail likely side IPMN    Therapeutic Drug Monitoring Labs:  None    Prior IBD Therapies:  Proctofoam - partially effective  imuran (pancreatitis)  MTX (leukopenia)  Remicade- secondary nonresponder  Humira- discontinued due to drug induced lupus due to joint pains  Entocort- 2018, broke open capsule when taking- not sure if effective   Cimzia [id number]- secondary nonresponder  Stelara ()- discontinued due to rash  Canasa ineffective     Current IBD Therapies:  None with plans to start entyvio- entyvio history Entyvio q 8 weeks (18- 10/27/20)- pt discontinued due to recurrent UTIs, palpitations    Vaccinations:  Lab Results   Component Value Date    HEPBSAB >1000.00 2024    HEPBSAB Reactive 2024     No results found for: "HEPBSURFABQU"  Lab Results   Component Value Date    HEPAIGG Non-reactive 2024     Lab Results   Component Value Date    VARICELLAZOS 883.00 2024    VARICELLAINT Positive 2024     Immunization History   Administered Date(s) Administered    COVID-19, MRNA, LN-S, PF (Pfizer) (Purple Cap) 2020, 2021, 2021    Hepatitis B (recombinant) Adjuvanted, 2 dose 2019, 2020    Influenza 2013, 10/25/2022    Influenza - Quadrivalent - PF *Preferred* (6 months and older) 2013, 10/04/2016, 2017, 2018, 10/02/2019, 09/10/2020, 2021, 10/25/2022, " 10/09/2023    Influenza Split 09/18/2013    Pneumococcal Conjugate - 13 Valent 06/28/2017    Pneumococcal Polysaccharide - 23 Valent 08/19/2015    Td (ADULT) 06/28/2013    Tdap 12/29/2019     Flu shot: recommended yearly   COVID vaccine/booster:  per CDC recommendations  Tetanus (Tdap):  due 12/2029  PPSV 20: due now  HPV:  recommended  Meningococcal: NA  Hepatitis B: will check immunity     Hepatitis A:  will check immunity     MMR (live vaccine): will check immunity          Shingrix: recommended    Review of Systems   Constitutional:  Negative for chills, fever and weight loss.   HENT:          No oral ulcers, dysphagia, oral thrush   Eyes:  Negative for blurred vision, pain and redness.   Respiratory:  Negative for cough and shortness of breath.    Cardiovascular:  Negative for chest pain.   Gastrointestinal:  Negative for abdominal pain, heartburn, nausea and vomiting.   Genitourinary:  Negative for dysuria and hematuria.   Musculoskeletal:  Negative for back pain and joint pain.   Skin:  Negative for rash.   Psychiatric/Behavioral:  Negative for depression. The patient is not nervous/anxious and does not have insomnia.      Medical/Surgical History:    has a past medical history of Abnormal Pap smear (2007), Alkaline phosphatase elevation- based on lab from 4/9/2019  (05/28/2019), Arthritis, Avascular necrosis of bone of hip, left, Bacterial vaginosis, Depression (08/05/2017), Drug-induced pancreatitis (imuran), Generalized anxiety disorder, Genital HSV, GERD (gastroesophageal reflux disease), Hypertension, Ileostomy in place (07/09/2012), Kidney stone, Long QT syndrome, Melanoma in situ (excised-buttocks 2018, para-stomal site 2019), Moderate episode of recurrent major depressive disorder (02/02/2024), Multiple thyroid nodules (11/27/2018), Osteopenia of multiple sites (01/07/2019), Pancreas cyst (tail, 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), Recurrent Clostridioides difficile diarrhea, and Recurrent UTI (04/03/2013).   has a past  surgical history that includes Total colectomy; Ileostomy; Colon surgery; Upper gastrointestinal endoscopy; Colonoscopy; CKC; Appendectomy; Bladder surgery; Skin biopsy; Abdominal surgery; Oophorectomy (Right, 04/16/2015); Portacath placement (02/21/2017); Total abdominal hysterectomy (04/16/2015); Small intestine surgery; Tubal ligation (06/06/2012); Bilateral salpingo-oophorectomy (BSO) (Bilateral, 05/30/2019); Lysis of adhesions (N/A, 05/30/2019); Excision of melanoma (07/17/2019); Diagnostic laparoscopy (N/A, 07/09/2020); Laparoscopic lysis of adhesions (N/A, 07/09/2020); Cystoscopy w/ ureteral stent placement (Left, 09/15/2020); Ureteroscopy (Left, 09/23/2020); Cystoscopy (09/23/2020); Laser lithotripsy (09/23/2020); Ureteroscopic removal of ureteric calculus (09/23/2020); breast lift; Breast surgery; Augmentation of breast (Bilateral, 06/2022); Arthroplasty of shoulder (Left, 7/18/2023); Esophagogastroduodenoscopy (N/A, 1/3/2024); Ileoscopy (N/A, 1/3/2024); and Ileoscopy (N/A, 1/24/2024).    Family History:   family history includes Breast cancer (age of onset: 41) in her maternal cousin; Cancer in her father and maternal grandfather; Colon cancer in her father; Crohn's disease in her brother and daughter; Diabetes in her maternal grandfather and paternal grandfather; Endometrial cancer in her maternal aunt; Hearing loss in her paternal grandmother; Heart disease in her maternal grandfather; Hyperlipidemia in her father; Hypertension in her mother; Liver cancer in her father; Skin cancer in her maternal grandfather.     Social History:    reports that she has never smoked. She has never used smokeless tobacco. She reports that she does not currently use alcohol. She reports that she does not use drugs.     Current Medications:   Outpatient Medications Marked as Taking for the 1/16/24 encounter (Office Visit) with Peace Garcia MD   Medication Sig Dispense Refill    clonazePAM (KLONOPIN) 1 MG tablet Take 1  tablet (1 mg total) by mouth 2 (two) times daily. 60 tablet 2    droNABinol (MARINOL) 5 MG capsule Take 1 capsule (5 mg total) by mouth 2 (two) times daily before meals. (Patient not taking: Reported on 2/2/2024) 60 capsule 1    gabapentin (NEURONTIN) 300 MG capsule Take 1 capsule (300 mg total) by mouth 2 (two) times daily. 60 capsule 11    mirtazapine (REMERON SOL-TAB) 30 MG disintegrating tablet Take 1 tablet (30 mg total) by mouth nightly. 30 tablet 0    multivitamin (THERAGRAN) per tablet Take 1 tablet by mouth once daily.      nadoloL (CORGARD) 40 MG tablet Take 1 tablet (40 mg total) by mouth once daily. Patient needs appointment before next refill. 30 tablet 0    pantoprazole (PROTONIX) 40 MG tablet Take 1 tablet (40 mg total) by mouth 2 (two) times daily. 60 tablet 12    valACYclovir (VALTREX) 500 MG tablet Take 1 tablet (500 mg total) by mouth once daily. 90 tablet 3    zolpidem (AMBIEN) 10 mg Tab Take 1 tablet (10 mg total) by mouth every evening. 30 tablet 2    [DISCONTINUED] cyclobenzaprine (FLEXERIL) 10 MG tablet Take 1 tablet (10 mg total) by mouth every evening as needed for muscle pain (Patient not taking: Reported on 1/30/2024) 15 tablet 0    [DISCONTINUED] loperamide (IMODIUM) 2 mg capsule Take 1 capsule (2 mg total) by mouth 4 (four) times daily as needed for Diarrhea. 40 capsule 0    [DISCONTINUED] promethazine (PHENERGAN) 25 MG tablet Take 1 tablet (25 mg total) by mouth every 6 (six) hours as needed for Nausea. 40 tablet 0     Current Facility-Administered Medications for the 1/16/24 encounter (Office Visit) with Pecae Garcia MD   Medication Dose Route Frequency Provider Last Rate Last Admin    estradiol valerate (DELESTROGEN) injection 20 mg/mL  20 mg Intramuscular Q30 Days Gilson El MD   20 mg at 12/30/22 0902          Vital Signs:  LMP 03/30/2015      Physical Exam  Cardiovascular:      Rate and Rhythm: Normal rate and regular rhythm.      Pulses: Normal pulses.      Heart  sounds: Normal heart sounds. No murmur heard.  Pulmonary:      Effort: Pulmonary effort is normal. No respiratory distress.      Breath sounds: Normal breath sounds.   Abdominal:      General: Bowel sounds are normal. There is no distension.      Palpations: Abdomen is soft.      Tenderness: There is abdominal tenderness (mild diffuse). There is no guarding or rebound.      Comments: RLQ Stoma in place     Labs: Reviewed  Lab Results   Component Value Date    CRP 33.2 (H) 01/02/2024    CALPROTECTIN 281.3 (H) 12/31/2023     Lab Results   Component Value Date    HEPBSAG Non-reactive 09/19/2023    HEPBCAB Non-reactive 01/02/2024     Lab Results   Component Value Date    TBGOLDPLUS Negative 01/02/2024     Lab Results   Component Value Date    KPTMBBYO69EL 21 (L) 06/13/2019    GDGLBSOX99 278 01/02/2024     Lab Results   Component Value Date    WBC 7.01 01/10/2024    HGB 10.1 (L) 01/10/2024    HCT 32.2 (L) 01/10/2024    MCV 92 01/10/2024     01/10/2024     Lab Results   Component Value Date    CREATININE 0.7 01/10/2024    ALBUMIN 2.8 (L) 01/10/2024    BILITOT 0.2 01/10/2024    ALKPHOS 240 (H) 01/10/2024    AST 34 01/10/2024    ALT 46 (H) 01/10/2024       Assessment/Plan:  Ivy Salgado is a 43 y.o. female with Crohn's disease with end ileostomy and recurrent ileitis, recurrent C diff (2014 x 2), ARPITA/depression, arthritis, h/o abnormal pap smear- LYLA I, nephrolithiasis-nonobstructive, GERD, long QTc syndrome (Type III, on nadolol), s/p SHELLIE (2015) for recurrent ovarian cysts and endometriosis, early melanoma in situ (buttocks and para-stomal site, excised in 2018 and 2019 respectively), history of genital HSV, imuran induced pancreatitis, pancreatic tail cyst, recurrent UTI (h/o ESBL 2018), multiple thyroid nodules, osteopenia, history bacterial vaginosis, h/o abnormal LFTs (elevated ALP since 2016), family history of colon cancer.      Today is f/u after recent hospital discharge and ongoing high ileostomy output  with nausea/vomiting and dehydration. Pt agreeable to proceed with inpatient admission again with plan for IVFs, stool studies and consider IV steroids followed by entyvio outpatient and maximizing antidiarrheals.      # High ileostomy output:  - admit to inpatient hospital medicine service with GI consultation- direct admit from clinic  - multifactorial  - stool studies to rule out infection  - mild ileitis out of proportion to ileostomy output and I don't feel likely contributing  - antidiarrheals if no infection- will maximize from imodium 1 tab qid to 2 tabs qid, lomotil - pt felt small doses even led to palpitations, QT prolongation so unable to give lotronex, octreotide may help  - shortened bowel syndrome diet may be helpful  - continue PPI     # Crohn's disease with end ileostomy and recurrent ileitis:  - note melanoma  - note genital HSV- on valtrex prophylaxis  - note h/o endometriosis  - repeat stool calprotectin  - smoking status: never smoked  - consider IV steroids if symptoms not improving during admission though scope findings of mild ileitis out of proportion to patient's ostomy output  - discussed MOA, risks, route/dose of entyvio and will start insurance authorization for induction followed by maintenance 300 mg IV q 8 weeks  - basic labs: CBC, CMP, CRP, HCV Ab, HIV, vitamin B12, TSH, TTG IgA/total serum IgA  - drug monitoring labs: CBC/CMP q 6 mos, TPMT (normal metabolizer 1/2024), TB quantiferon (neg 1/2024), Hep B testing (HBsAg, HBtotalcoreAb neg 1/2024)    # Elevated liver tests (AST/ALT, ALP)  - AST/ALT now resolved with serologic w/u 9/2023 acute hep panel neg, 11/2023 HBsAg, HBcAb/SAURAV/AMA/ASMA neg, iron studies neg; 1/2024 normal ceruloplasmin A1AT normal  - due to elevated ALP (since 2019) MRCP done with no findings of PSC though pt has family hx PSC    # Pancreatic tail cyst (CT 12/2023)  - repeat CT in 12/2024  - will consider referral to pancreas cyst clinic     # Early melanoma in  situ (buttocks and para-stomal site, excised in 2018 and 2019 respectively):  - dermatologist- Dr. Simon- last seen 5/2023  - next skin exam due 5/2024  - wear sunblock, hats, sunprotective clothing    # Bone health:  - left hip avascular necrosis on past CT  - osteopenia- has kidney stones so calcium supplements would defer or consult with renal/urology  - vit D deficiency- recommend 5000 IU 2 tabs daily    # Immunodeficiency due to long term immunosuppressive drug therapy and IBD specific health maintenance:  CRC risk- no risk, TAC  Skin exam- recommend using sunblock/hats/sunprotective clothing and yearly skin exams  Pap smear- SHELLIE with BSO  Vaccines- no live vaccines, will address vaccines at future visit     Visit today is associated with current or anticipated ongoing medical care related to this patient's single serious condition/complex condition Crohn's disease and multiple other combordities.     Follow up for TBD after hospital admission.    Thank you again for sending Ivy Salgado to see Dr. Peace Garcia today at the [facility]. Please don't hesitate to contact Dr. Garcia if there are any questions regarding this evaluation, or if you have any other patients with inflammatory bowel disease for whom you would like a consultation. You can reach Dr. Garcia at [phone] or by email at [email or web address]    Peace Garcia MD  Department of Gastroenterology  Medical Director, Inflammatory Bowel Disease

## 2024-01-12 NOTE — TELEPHONE ENCOUNTER
"Called patient to check on her since she was advised to come to ER for high output yesterday and it does not look like she came in.     No answer. Left voicemail for patient to call back clinic.    Will also send a portal message and call patient again if no response in 2-4 hours.     Ext: [phone] - called and spoke to patient. She said she still is feeling terrible but she is "not going to the ER unless she's dying because she sat there for 13+ hours and couldn't feel her legs and she just can't do that again." Advised her we recommend if her pain is severe and she has elevated ostomy output that she needs to be evaluated. Placed an outpatient order for GI Pathogens Panel PCR in case she decides she wants to at the least do that.     Will further discuss her with Dr Garcia who she will be seeing Tuesday if not inpatient.       "

## 2024-01-15 PROCEDURE — 99358 PROLONG SERVICE W/O CONTACT: CPT | Mod: S$GLB,,, | Performed by: INTERNAL MEDICINE

## 2024-01-15 PROCEDURE — 99359 PROLONG SERV W/O CONTACT ADD: CPT | Mod: S$GLB,,, | Performed by: INTERNAL MEDICINE

## 2024-01-16 ENCOUNTER — OFFICE VISIT (OUTPATIENT)
Dept: GASTROENTEROLOGY | Facility: CLINIC | Age: 44
End: 2024-01-16
Payer: COMMERCIAL

## 2024-01-16 ENCOUNTER — HOSPITAL ENCOUNTER (INPATIENT)
Facility: HOSPITAL | Age: 44
LOS: 11 days | Discharge: HOME | DRG: 386 | End: 2024-01-27
Attending: HOSPITALIST | Admitting: HOSPITALIST
Payer: COMMERCIAL

## 2024-01-16 VITALS
BODY MASS INDEX: 21.23 KG/M2 | SYSTOLIC BLOOD PRESSURE: 116 MMHG | DIASTOLIC BLOOD PRESSURE: 84 MMHG | HEART RATE: 64 BPM | OXYGEN SATURATION: 97 % | WEIGHT: 112.44 LBS | HEIGHT: 61 IN | TEMPERATURE: 98 F

## 2024-01-16 DIAGNOSIS — K50.018 CROHN'S DISEASE OF SMALL INTESTINE WITH OTHER COMPLICATION: ICD-10-CM

## 2024-01-16 DIAGNOSIS — K50.10 CROHN'S COLITIS: ICD-10-CM

## 2024-01-16 DIAGNOSIS — B96.89 BACTERIAL VAGINOSIS: ICD-10-CM

## 2024-01-16 DIAGNOSIS — R19.7 DIARRHEA, UNSPECIFIED TYPE: ICD-10-CM

## 2024-01-16 DIAGNOSIS — N20.0 KIDNEY STONE: ICD-10-CM

## 2024-01-16 DIAGNOSIS — K50.80 CROHN'S DISEASE OF BOTH SMALL AND LARGE INTESTINE WITHOUT COMPLICATION: Chronic | ICD-10-CM

## 2024-01-16 DIAGNOSIS — N76.0 BACTERIAL VAGINOSIS: ICD-10-CM

## 2024-01-16 DIAGNOSIS — A04.71 RECURRENT CLOSTRIDIOIDES DIFFICILE DIARRHEA: Primary | ICD-10-CM

## 2024-01-16 DIAGNOSIS — K85.30 DRUG-INDUCED ACUTE PANCREATITIS, UNSPECIFIED COMPLICATION STATUS: ICD-10-CM

## 2024-01-16 DIAGNOSIS — Z93.2 HIGH OUTPUT ILEOSTOMY: ICD-10-CM

## 2024-01-16 DIAGNOSIS — I45.81 LONG QT SYNDROME: ICD-10-CM

## 2024-01-16 DIAGNOSIS — K86.2 PANCREAS CYST: ICD-10-CM

## 2024-01-16 DIAGNOSIS — K50.80 CROHN'S DISEASE OF BOTH SMALL AND LARGE INTESTINE: Primary | Chronic | ICD-10-CM

## 2024-01-16 DIAGNOSIS — R74.8 ALKALINE PHOSPHATASE ELEVATION: ICD-10-CM

## 2024-01-16 DIAGNOSIS — K50.018 CROHN'S DISEASE OF ILEUM WITH OTHER COMPLICATION: ICD-10-CM

## 2024-01-16 DIAGNOSIS — F41.1 GENERALIZED ANXIETY DISORDER: ICD-10-CM

## 2024-01-16 DIAGNOSIS — R19.8 HIGH OUTPUT ILEOSTOMY: ICD-10-CM

## 2024-01-16 DIAGNOSIS — R07.9 CHEST PAIN: ICD-10-CM

## 2024-01-16 DIAGNOSIS — Z93.2 ILEOSTOMY IN PLACE: ICD-10-CM

## 2024-01-16 DIAGNOSIS — C43.9 MALIGNANT MELANOMA, UNSPECIFIED SITE: ICD-10-CM

## 2024-01-16 DIAGNOSIS — M87.052 AVASCULAR NECROSIS OF BONE OF HIP, LEFT: ICD-10-CM

## 2024-01-16 DIAGNOSIS — F41.1 GENERALIZED ANXIETY DISORDER: Chronic | ICD-10-CM

## 2024-01-16 PROBLEM — R11.0 NAUSEA: Status: ACTIVE | Noted: 2018-02-16

## 2024-01-16 LAB
ALBUMIN SERPL BCP-MCNC: 3.3 G/DL (ref 3.5–5.2)
ALP SERPL-CCNC: 146 U/L (ref 55–135)
ALT SERPL W/O P-5'-P-CCNC: 14 U/L (ref 10–44)
ANION GAP SERPL CALC-SCNC: 8 MMOL/L (ref 8–16)
AST SERPL-CCNC: 19 U/L (ref 10–40)
BACTERIA #/AREA URNS AUTO: ABNORMAL /HPF
BASOPHILS # BLD AUTO: 0.06 K/UL (ref 0–0.2)
BASOPHILS NFR BLD: 0.9 % (ref 0–1.9)
BILIRUB SERPL-MCNC: 0.3 MG/DL (ref 0.1–1)
BILIRUB UR QL STRIP: NEGATIVE
BUN SERPL-MCNC: 11 MG/DL (ref 6–20)
CALCIUM SERPL-MCNC: 9.8 MG/DL (ref 8.7–10.5)
CAOX CRY UR QL COMP ASSIST: ABNORMAL
CHLORIDE SERPL-SCNC: 110 MMOL/L (ref 95–110)
CLARITY UR REFRACT.AUTO: CLEAR
CO2 SERPL-SCNC: 23 MMOL/L (ref 23–29)
COLOR UR AUTO: YELLOW
CREAT SERPL-MCNC: 0.7 MG/DL (ref 0.5–1.4)
CRP SERPL-MCNC: 1.8 MG/L (ref 0–8.2)
DIFFERENTIAL METHOD BLD: ABNORMAL
EOSINOPHIL # BLD AUTO: 0.2 K/UL (ref 0–0.5)
EOSINOPHIL NFR BLD: 2.5 % (ref 0–8)
ERYTHROCYTE [DISTWIDTH] IN BLOOD BY AUTOMATED COUNT: 13.1 % (ref 11.5–14.5)
ERYTHROCYTE [SEDIMENTATION RATE] IN BLOOD BY PHOTOMETRIC METHOD: 65 MM/HR (ref 0–36)
EST. GFR  (NO RACE VARIABLE): >60 ML/MIN/1.73 M^2
GLUCOSE SERPL-MCNC: 101 MG/DL (ref 70–110)
GLUCOSE UR QL STRIP: NEGATIVE
HCT VFR BLD AUTO: 32.9 % (ref 37–48.5)
HGB BLD-MCNC: 10.6 G/DL (ref 12–16)
HGB UR QL STRIP: NEGATIVE
IMM GRANULOCYTES # BLD AUTO: 0.01 K/UL (ref 0–0.04)
IMM GRANULOCYTES NFR BLD AUTO: 0.1 % (ref 0–0.5)
KETONES UR QL STRIP: NEGATIVE
LEUKOCYTE ESTERASE UR QL STRIP: ABNORMAL
LYMPHOCYTES # BLD AUTO: 2.1 K/UL (ref 1–4.8)
LYMPHOCYTES NFR BLD: 31.4 % (ref 18–48)
MAGNESIUM SERPL-MCNC: 2 MG/DL (ref 1.6–2.6)
MCH RBC QN AUTO: 29 PG (ref 27–31)
MCHC RBC AUTO-ENTMCNC: 32.2 G/DL (ref 32–36)
MCV RBC AUTO: 90 FL (ref 82–98)
MICROSCOPIC COMMENT: ABNORMAL
MONOCYTES # BLD AUTO: 0.8 K/UL (ref 0.3–1)
MONOCYTES NFR BLD: 11 % (ref 4–15)
NEUTROPHILS # BLD AUTO: 3.7 K/UL (ref 1.8–7.7)
NEUTROPHILS NFR BLD: 54.1 % (ref 38–73)
NITRITE UR QL STRIP: NEGATIVE
NRBC BLD-RTO: 0 /100 WBC
PH UR STRIP: 6 [PH] (ref 5–8)
PHOSPHATE SERPL-MCNC: 5.3 MG/DL (ref 2.7–4.5)
PLATELET # BLD AUTO: 425 K/UL (ref 150–450)
PMV BLD AUTO: 9.1 FL (ref 9.2–12.9)
POTASSIUM SERPL-SCNC: 3.9 MMOL/L (ref 3.5–5.1)
PROT SERPL-MCNC: 7.7 G/DL (ref 6–8.4)
PROT UR QL STRIP: NEGATIVE
RBC # BLD AUTO: 3.66 M/UL (ref 4–5.4)
RBC #/AREA URNS AUTO: 2 /HPF (ref 0–4)
SODIUM SERPL-SCNC: 141 MMOL/L (ref 136–145)
SP GR UR STRIP: 1.03 (ref 1–1.03)
SQUAMOUS #/AREA URNS AUTO: 2 /HPF
URN SPEC COLLECT METH UR: ABNORMAL
WBC # BLD AUTO: 6.82 K/UL (ref 3.9–12.7)
WBC #/AREA URNS AUTO: 7 /HPF (ref 0–5)

## 2024-01-16 PROCEDURE — 87507 IADNA-DNA/RNA PROBE TQ 12-25: CPT

## 2024-01-16 PROCEDURE — 25000003 PHARM REV CODE 250: Performed by: STUDENT IN AN ORGANIZED HEALTH CARE EDUCATION/TRAINING PROGRAM

## 2024-01-16 PROCEDURE — 83735 ASSAY OF MAGNESIUM: CPT

## 2024-01-16 PROCEDURE — 84100 ASSAY OF PHOSPHORUS: CPT

## 2024-01-16 PROCEDURE — 87046 STOOL CULTR AEROBIC BACT EA: CPT

## 2024-01-16 PROCEDURE — 86140 C-REACTIVE PROTEIN: CPT

## 2024-01-16 PROCEDURE — 81001 URINALYSIS AUTO W/SCOPE: CPT

## 2024-01-16 PROCEDURE — 3079F DIAST BP 80-89 MM HG: CPT | Mod: CPTII,S$GLB,, | Performed by: INTERNAL MEDICINE

## 2024-01-16 PROCEDURE — 25000003 PHARM REV CODE 250

## 2024-01-16 PROCEDURE — 3008F BODY MASS INDEX DOCD: CPT | Mod: CPTII,S$GLB,, | Performed by: INTERNAL MEDICINE

## 2024-01-16 PROCEDURE — 85025 COMPLETE CBC W/AUTO DIFF WBC: CPT

## 2024-01-16 PROCEDURE — 82653 EL-1 FECAL QUANTITATIVE: CPT

## 2024-01-16 PROCEDURE — G2211 COMPLEX E/M VISIT ADD ON: HCPCS | Mod: S$GLB,,, | Performed by: INTERNAL MEDICINE

## 2024-01-16 PROCEDURE — 1159F MED LIST DOCD IN RCRD: CPT | Mod: CPTII,S$GLB,, | Performed by: INTERNAL MEDICINE

## 2024-01-16 PROCEDURE — 85652 RBC SED RATE AUTOMATED: CPT

## 2024-01-16 PROCEDURE — 1111F DSCHRG MED/CURRENT MED MERGE: CPT | Mod: CPTII,S$GLB,, | Performed by: INTERNAL MEDICINE

## 2024-01-16 PROCEDURE — 11000001 HC ACUTE MED/SURG PRIVATE ROOM

## 2024-01-16 PROCEDURE — 99215 OFFICE O/P EST HI 40 MIN: CPT | Mod: S$GLB,,, | Performed by: INTERNAL MEDICINE

## 2024-01-16 PROCEDURE — 99222 1ST HOSP IP/OBS MODERATE 55: CPT | Mod: ,,, | Performed by: INTERNAL MEDICINE

## 2024-01-16 PROCEDURE — 3044F HG A1C LEVEL LT 7.0%: CPT | Mod: CPTII,S$GLB,, | Performed by: INTERNAL MEDICINE

## 2024-01-16 PROCEDURE — 3074F SYST BP LT 130 MM HG: CPT | Mod: CPTII,S$GLB,, | Performed by: INTERNAL MEDICINE

## 2024-01-16 PROCEDURE — 83993 ASSAY FOR CALPROTECTIN FECAL: CPT

## 2024-01-16 PROCEDURE — 80053 COMPREHEN METABOLIC PANEL: CPT

## 2024-01-16 PROCEDURE — 27000207 HC ISOLATION

## 2024-01-16 PROCEDURE — 63600175 PHARM REV CODE 636 W HCPCS

## 2024-01-16 PROCEDURE — 87449 NOS EACH ORGANISM AG IA: CPT | Mod: 91

## 2024-01-16 PROCEDURE — 36415 COLL VENOUS BLD VENIPUNCTURE: CPT

## 2024-01-16 PROCEDURE — 87427 SHIGA-LIKE TOXIN AG IA: CPT | Mod: 59

## 2024-01-16 PROCEDURE — 82705 FATS/LIPIDS FECES QUAL: CPT

## 2024-01-16 PROCEDURE — 87045 FECES CULTURE AEROBIC BACT: CPT

## 2024-01-16 PROCEDURE — 87449 NOS EACH ORGANISM AG IA: CPT

## 2024-01-16 RX ORDER — IBUPROFEN 200 MG
24 TABLET ORAL
Status: DISCONTINUED | OUTPATIENT
Start: 2024-01-16 | End: 2024-01-27 | Stop reason: HOSPADM

## 2024-01-16 RX ORDER — CLONAZEPAM 0.5 MG/1
1 TABLET ORAL 2 TIMES DAILY PRN
Status: DISCONTINUED | OUTPATIENT
Start: 2024-01-16 | End: 2024-01-27 | Stop reason: HOSPADM

## 2024-01-16 RX ORDER — OXYCODONE HYDROCHLORIDE 5 MG/1
5 TABLET ORAL EVERY 6 HOURS PRN
Status: DISCONTINUED | OUTPATIENT
Start: 2024-01-16 | End: 2024-01-16

## 2024-01-16 RX ORDER — LOPERAMIDE HYDROCHLORIDE 2 MG/1
2 CAPSULE ORAL 4 TIMES DAILY PRN
Status: DISCONTINUED | OUTPATIENT
Start: 2024-01-16 | End: 2024-01-16

## 2024-01-16 RX ORDER — MIRTAZAPINE 15 MG/1
30 TABLET, ORALLY DISINTEGRATING ORAL NIGHTLY
Status: DISCONTINUED | OUTPATIENT
Start: 2024-01-16 | End: 2024-01-27 | Stop reason: HOSPADM

## 2024-01-16 RX ORDER — ENOXAPARIN SODIUM 100 MG/ML
40 INJECTION SUBCUTANEOUS EVERY 24 HOURS
Status: DISCONTINUED | OUTPATIENT
Start: 2024-01-16 | End: 2024-01-27 | Stop reason: HOSPADM

## 2024-01-16 RX ORDER — LOPERAMIDE HYDROCHLORIDE 2 MG/1
2 CAPSULE ORAL 4 TIMES DAILY
Status: DISCONTINUED | OUTPATIENT
Start: 2024-01-16 | End: 2024-01-17

## 2024-01-16 RX ORDER — IBUPROFEN 200 MG
16 TABLET ORAL
Status: DISCONTINUED | OUTPATIENT
Start: 2024-01-16 | End: 2024-01-27 | Stop reason: HOSPADM

## 2024-01-16 RX ORDER — NALOXONE HCL 0.4 MG/ML
0.02 VIAL (ML) INJECTION
Status: DISCONTINUED | OUTPATIENT
Start: 2024-01-16 | End: 2024-01-27 | Stop reason: HOSPADM

## 2024-01-16 RX ORDER — TALC
6 POWDER (GRAM) TOPICAL NIGHTLY PRN
Status: DISCONTINUED | OUTPATIENT
Start: 2024-01-16 | End: 2024-01-27 | Stop reason: HOSPADM

## 2024-01-16 RX ORDER — OXYCODONE HYDROCHLORIDE 10 MG/1
10 TABLET ORAL ONCE
Status: COMPLETED | OUTPATIENT
Start: 2024-01-16 | End: 2024-01-16

## 2024-01-16 RX ORDER — GABAPENTIN 300 MG/1
300 CAPSULE ORAL 2 TIMES DAILY
Status: DISCONTINUED | OUTPATIENT
Start: 2024-01-16 | End: 2024-01-27 | Stop reason: HOSPADM

## 2024-01-16 RX ORDER — SODIUM CHLORIDE 0.9 % (FLUSH) 0.9 %
10 SYRINGE (ML) INJECTION EVERY 12 HOURS PRN
Status: DISCONTINUED | OUTPATIENT
Start: 2024-01-16 | End: 2024-01-27 | Stop reason: HOSPADM

## 2024-01-16 RX ORDER — SODIUM CHLORIDE, SODIUM LACTATE, POTASSIUM CHLORIDE, CALCIUM CHLORIDE 600; 310; 30; 20 MG/100ML; MG/100ML; MG/100ML; MG/100ML
INJECTION, SOLUTION INTRAVENOUS CONTINUOUS
Status: DISCONTINUED | OUTPATIENT
Start: 2024-01-16 | End: 2024-01-24

## 2024-01-16 RX ORDER — MORPHINE SULFATE 4 MG/ML
4 INJECTION, SOLUTION INTRAMUSCULAR; INTRAVENOUS EVERY 6 HOURS PRN
Status: DISCONTINUED | OUTPATIENT
Start: 2024-01-16 | End: 2024-01-18

## 2024-01-16 RX ORDER — HYDROMORPHONE HYDROCHLORIDE 1 MG/ML
0.5 INJECTION, SOLUTION INTRAMUSCULAR; INTRAVENOUS; SUBCUTANEOUS EVERY 6 HOURS PRN
Status: DISCONTINUED | OUTPATIENT
Start: 2024-01-16 | End: 2024-01-16

## 2024-01-16 RX ORDER — GLUCAGON 1 MG
1 KIT INJECTION
Status: DISCONTINUED | OUTPATIENT
Start: 2024-01-16 | End: 2024-01-27 | Stop reason: HOSPADM

## 2024-01-16 RX ORDER — HYDROMORPHONE HYDROCHLORIDE 1 MG/ML
0.5 INJECTION, SOLUTION INTRAMUSCULAR; INTRAVENOUS; SUBCUTANEOUS EVERY 6 HOURS PRN
Status: DISCONTINUED | OUTPATIENT
Start: 2024-01-16 | End: 2024-01-18

## 2024-01-16 RX ORDER — PROMETHAZINE HYDROCHLORIDE 25 MG/1
25 TABLET ORAL EVERY 6 HOURS PRN
Status: DISCONTINUED | OUTPATIENT
Start: 2024-01-16 | End: 2024-01-27 | Stop reason: HOSPADM

## 2024-01-16 RX ORDER — OXYCODONE HYDROCHLORIDE 10 MG/1
10 TABLET ORAL EVERY 6 HOURS PRN
Status: DISCONTINUED | OUTPATIENT
Start: 2024-01-16 | End: 2024-01-16

## 2024-01-16 RX ORDER — PANTOPRAZOLE SODIUM 40 MG/1
40 TABLET, DELAYED RELEASE ORAL 2 TIMES DAILY
Status: DISCONTINUED | OUTPATIENT
Start: 2024-01-16 | End: 2024-01-22

## 2024-01-16 RX ORDER — DRONABINOL 2.5 MG/1
5 CAPSULE ORAL
Status: DISCONTINUED | OUTPATIENT
Start: 2024-01-16 | End: 2024-01-27 | Stop reason: HOSPADM

## 2024-01-16 RX ADMIN — CLONAZEPAM 1 MG: 0.5 TABLET ORAL at 09:01

## 2024-01-16 RX ADMIN — THERA TABS 1 TABLET: TAB at 03:01

## 2024-01-16 RX ADMIN — SODIUM CHLORIDE, POTASSIUM CHLORIDE, SODIUM LACTATE AND CALCIUM CHLORIDE 1000 ML: 600; 310; 30; 20 INJECTION, SOLUTION INTRAVENOUS at 07:01

## 2024-01-16 RX ADMIN — PROMETHAZINE HYDROCHLORIDE 25 MG: 25 TABLET ORAL at 04:01

## 2024-01-16 RX ADMIN — DRONABINOL 5 MG: 2.5 CAPSULE ORAL at 03:01

## 2024-01-16 RX ADMIN — MORPHINE SULFATE 4 MG: 4 INJECTION INTRAVENOUS at 07:01

## 2024-01-16 RX ADMIN — MIRTAZAPINE 30 MG: 15 TABLET, ORALLY DISINTEGRATING ORAL at 09:01

## 2024-01-16 RX ADMIN — PANTOPRAZOLE SODIUM 40 MG: 40 TABLET, DELAYED RELEASE ORAL at 09:01

## 2024-01-16 RX ADMIN — HYDROMORPHONE HYDROCHLORIDE 0.5 MG: 0.5 INJECTION, SOLUTION INTRAMUSCULAR; INTRAVENOUS; SUBCUTANEOUS at 10:01

## 2024-01-16 RX ADMIN — CLONAZEPAM 1 MG: 0.5 TABLET ORAL at 03:01

## 2024-01-16 RX ADMIN — Medication 6 MG: at 09:01

## 2024-01-16 RX ADMIN — LOPERAMIDE HYDROCHLORIDE 2 MG: 2 CAPSULE ORAL at 03:01

## 2024-01-16 RX ADMIN — ENOXAPARIN SODIUM 40 MG: 40 INJECTION SUBCUTANEOUS at 04:01

## 2024-01-16 RX ADMIN — LOPERAMIDE HYDROCHLORIDE 2 MG: 2 CAPSULE ORAL at 09:01

## 2024-01-16 RX ADMIN — OXYCODONE HYDROCHLORIDE 10 MG: 10 TABLET ORAL at 05:01

## 2024-01-16 RX ADMIN — GABAPENTIN 300 MG: 300 CAPSULE ORAL at 09:01

## 2024-01-16 NOTE — PROVIDER TRANSFER
Direct Admit From Clinic (or Home) Acceptance Note    Referring Physician: Amandeep Garcia (GI)    Accepting Physician: Dony Paz MD    Date of Acceptance: 01/16/2024    Location of Patient: GI Clinic    Reason for Admit: IVF, control of high output ileostomy output    Report from Referring Physician: 43-year-old woman with PMH GERD, Crohn's s/p ileostomy presented to GI clinic with high ileostomy output today. [facility] adm [facility] (12[facility]) with Crohn's flare.  Adm requested by GI (Dr. Peace Garcia) for IVF, ileostomy through stoma to see if Crohn's is worse, stool studies and treatment to get her output down.  Also c/f Small Duct Primary Sclerosing Cholangitis (PSC)    Labs/imaging:    To Do List:   Consult GI (IBD team)  Consult hepatology  IVF    Upon patient arrival to floor, please send SecureChat to [facility] or call extension [phone] to reach the triage physician for orders and team assignment. If no answer, do NOT leave a callback number after the beep, rather please send a SecureChat to [facility].  Do not page the attending physician associated with the patient on arrival (this physician may not be on duty at the time of arrival).     Dony Paz MD  Senior Physician  [facility]

## 2024-01-16 NOTE — SUBJECTIVE & OBJECTIVE
Past Medical History:   Diagnosis Date    Abnormal Pap smear 2007    Abnormal Pap smear 5/26/2011    Anemia     Anxiety     Arthritis     C. difficile diarrhea     Crohn's disease     Depression 8/5/2017    Encounter for blood transfusion     Genital HSV     History of colposcopy with cervical biopsy 2007 and 7/2011 2007-LYLA I  and 7/2011- LYLA I    Hypertension     Kidney stone     Kidney stone     Melanoma     Recurrent UTI 4/3/2013    S/P ileostomy 7/9/2012    Sterilization 6/23/2012       Past Surgical History:   Procedure Laterality Date    ABDOMINAL SURGERY      APPENDECTOMY      ARTHROPLASTY OF SHOULDER Left 7/18/2023    Procedure: ARTHROPLASTY, SHOULDER;  Surgeon: Sharon Babb MD;  Location: [facility];  Service: Orthopedics;  Laterality: Left;    AUGMENTATION OF BREAST Bilateral 06/2022    gel implants    BILATERAL SALPINGO-OOPHORECTOMY (BSO) Bilateral 05/30/2019    Procedure: SALPINGO-OOPHORECTOMY, BILATERAL;  Surgeon: Rupa German MD;  Location: [address];  Service: OB/GYN;  Laterality: Bilateral;    BLADDER SURGERY      partial cystectomy due to fistula    breast lift      BREAST SURGERY      [facility]      COLON SURGERY      COLONOSCOPY      CYSTOSCOPY  09/23/2020    Procedure: CYSTOSCOPY;  Surgeon: Sascha Florentino MD;  Location: [address];  Service: Urology;;    CYSTOSCOPY W/ URETERAL STENT PLACEMENT Left 09/15/2020    Procedure: CYSTOSCOPY, WITH URETERAL STENT INSERTION;  Surgeon: Sascha Florentino MD;  Location: [address];  Service: Urology;  Laterality: Left;    DIAGNOSTIC LAPAROSCOPY N/A 07/09/2020    Procedure: LAPAROSCOPY, DIAGNOSTIC;  Surgeon: Gilson El MD;  Location: [facility];  Service: OB/GYN;  Laterality: N/A;    ESOPHAGOGASTRODUODENOSCOPY N/A 1/3/2024    Procedure: EGD (ESOPHAGOGASTRODUODENOSCOPY);  Surgeon: Lily Lind MD;  Location: [address]);  Service: Endoscopy;  Laterality: N/A;    EXCISION OF MELANOMA  07/17/2019    ILEOSCOPY N/A 1/3/2024     Procedure: ILEOSCOPY;  Surgeon: Lily Lind MD;  Location: [facility] ENDO (2ND FLR);  Service: Endoscopy;  Laterality: N/A;    ILEOSTOMY      LAPAROSCOPIC LYSIS OF ADHESIONS N/A 07/09/2020    Procedure: LYSIS, ADHESIONS, LAPAROSCOPIC;  Surgeon: Gilson El MD;  Location: [facility] OR;  Service: OB/GYN;  Laterality: N/A;    LASER LITHOTRIPSY  09/23/2020    Procedure: LITHOTRIPSY, USING LASER;  Surgeon: Sascha Florentino MD;  [facility];  Service: Urology;;    LYSIS OF ADHESIONS N/A 05/30/2019    Procedure: LYSIS, ADHESIONS;  Surgeon: Rupa German MD;  [facility];  Service: OB/GYN;  Laterality: N/A;    OOPHORECTOMY Right 04/16/2015    PORTACATH PLACEMENT  02/21/2017    SKIN BIOPSY      SMALL INTESTINE SURGERY      age 18 Y    TOTAL ABDOMINAL HYSTERECTOMY  04/16/2015    TOTAL COLECTOMY      TUBAL LIGATION  06/06/2012    UPPER GASTROINTESTINAL ENDOSCOPY      URETEROSCOPIC REMOVAL OF URETERIC CALCULUS  09/23/2020    Procedure: REMOVAL, CALCULUS, URETER, URETEROSCOPIC;  Surgeon: Sascha Florentino MD;  Location: [facility] OR [address] [place] [address];  Service: Urology;;    URETEROSCOPY Left 09/23/2020    Procedure: URETEROSCOPY;  Surgeon: Sascha Florentino MD;  [address];  Service: Urology;  Laterality: Left;       Review of patient's allergies indicates:   Allergen Reactions    Azathioprine sodium Other (See Comments)     Other reaction(s): pancreatitis  Other reaction(s): pancreatitis    Methotrexate analogues Other (See Comments)     leukopenia    Stelara [ustekinumab] Other (See Comments)     Multiple infections    Zofran [ondansetron hcl (pf)] Other (See Comments)     Per patient causes prolong QT    Vancomycin analogues Other (See Comments)     Made her red    Azathioprine      Other reaction(s): Unknown    Methotrexate      Other reaction(s): infection-    Morphine Itching and Other (See Comments)     Other reaction(s): Itching    Zofran [ondansetron hcl]      Other  reaction(s): Hives    Bactrim [sulfamethoxazole-trimethoprim] Palpitations    Ciprofloxacin Palpitations     Family History       Problem Relation (Age of Onset)    Breast cancer Maternal Cousin (41)    Cancer Maternal Grandfather, Father    Colon cancer Father    Crohn's disease Brother, Daughter    Diabetes Paternal Grandfather, Maternal Grandfather    Endometrial cancer Maternal Aunt    Hearing loss Paternal Grandmother    Heart disease Maternal Grandfather    Hyperlipidemia Father    Hypertension Mother    Liver cancer Father    Skin cancer Maternal Grandfather          Tobacco Use    Smoking status: Never    Smokeless tobacco: Never   Substance and Sexual Activity    Alcohol use: Not Currently     Alcohol/week: 0.0 standard drinks of alcohol    Drug use: No    Sexual activity: Yes     Partners: Male     Birth control/protection: See Surgical Hx     Comment: HYST     Review of Systems   Constitutional:  Negative for chills and fever.   Gastrointestinal:  Positive for abdominal pain and diarrhea. Negative for anal bleeding, blood in stool, nausea and vomiting.     Objective:     Vital Signs (Most Recent):  Temp: 97.7 °F (36.5 °C) (01/16/24 1311)  Pulse: 72 (01/16/24 1311)  BP: 100/66 (01/16/24 1311)  SpO2: 99 % (01/16/24 1311) Vital Signs (24h Range):  Temp:  [97.7 °F (36.5 °C)-97.9 °F (36.6 °C)] 97.7 °F (36.5 °C)  Pulse:  [] 72  SpO2:  [97 %-100 %] 99 %  BP: (100-118)/(66-84) 100/66     Weight: 51 kg (112 lb 7 oz) (01/16/24 1430)  Body mass index is 21.24 kg/m².    No intake or output data in the 24 hours ending 01/16/24 1633    Lines/Drains/Airways       Drain  Duration                  Ileostomy 06/16/12 0000 RLQ 4231 days                     Physical Exam  Vitals reviewed.   Constitutional:       General: She is not in acute distress.  HENT:      Head: Normocephalic.      Mouth/Throat:      Mouth: Mucous membranes are dry.   Eyes:      Extraocular Movements: Extraocular movements intact.    Cardiovascular:      Rate and Rhythm: Normal rate.   Pulmonary:      Effort: Pulmonary effort is normal.   Abdominal:      General: There is no distension.      Palpations: Abdomen is soft.      Tenderness: There is no abdominal tenderness.      Comments: Ostomy bag to RUQ.   Musculoskeletal:         General: Normal range of motion.   Skin:     Coloration: Skin is not jaundiced.      Findings: No rash.   Neurological:      Mental Status: She is alert. Mental status is at baseline.   Psychiatric:         Mood and Affect: Mood normal.          Significant Labs:  All pertinent lab results from the last 24 hours have been reviewed.

## 2024-01-16 NOTE — NURSING
Notified charge, MIKE Fisher that myself and another nurse were unsuccessful at accessing the patient's left chest port. Charge RN on oncology unit was called and he said he would try to get down here to help access port. Dr. Shelley was updated at 1705 via secure chat that we were still waiting on the port to be accessed.

## 2024-01-16 NOTE — HPI
Patient is a 44 yo female with PMH of Crohn's disease s/p ileostomy, GERD, and ARPITA who presented from GI clinic at the request for admission from Dr. Peace Garcia for ongoing high ileostomy output. Of note, she was recently hospitalized 12/31-01/07 for similar presentation. She is reporting generalized 8/10 abdominal pain, nausea, high ileostomy output, decreased appetite,and heartburn. She states her symptoms are similar the symptoms she used to have with Crohn's flares in the past, but she reports she hasn't had a flare since 2020. She reports normally emptying her ileostomy bag 2-3 times after meals, but in the last few weeks, she has had to continuously empty the bag as high as 12 times even when not eating meals. Her appetite has been decreased, reporting only eating half of her normal intake. During her recent admission, she had a formal workup with EGD/ileoscopy and stool studies that were largely unrevealing aside from multiple ulcers in the prepyloric region. She denies fever, chills, chest pain, SOB, palpitations, headache, vision changes, skin changes, vomiting, urinary symptoms, blood in stool, weight loss, or weakness.     She arrived to [facility] as a direct admission with plans to be started on IVF, anti-diarrheals, pain medications, and plans for stool studies and repeat ileoscopy. She was admitted to hospital medicine for symptomatic care and management of high ileostomy output and potential Crohn's flare with consult to IBD team.

## 2024-01-16 NOTE — H&P
[facility] Medicine  History & Physical    Patient Name: Ivy Salgado  MRN: [id number]  Patient Class: IP- Inpatient  Admission Date: 1/16/2024  Attending Physician: Jamison Horton MD   Primary Care Provider: Luis Madden DO         Patient information was obtained from patient, past medical records, and ER records.     Subjective:     Principal Problem:<principal problem not specified>    Chief Complaint: No chief complaint on file.       HPI: Patient is a 42 yo female with PMH of Crohn's disease s/p ileostomy, GERD, and ARPITA who presented from GI clinic at the request for admission from Dr. Peace Garcia for ongoing high ileostomy output. Of note, she was recently hospitalized 12/31-01/07 for similar presentation. She is reporting generalized 8/10 abdominal pain, nausea, high ileostomy output, decreased appetite,and heartburn. She states her symptoms are similar the symptoms she used to have with Crohn's flares in the past, but she reports she hasn't had a flare since 2020. She reports normally emptying her ileostomy bag 2-3 times after meals, but in the last few weeks, she has had to continuously empty the bag as high as 12 times even when not eating meals. Her appetite has been decreased, reporting only eating half of her normal intake. During her recent admission, she had a formal workup with EGD/ileoscopy and stool studies that were largely unrevealing aside from multiple ulcers in the prepyloric region. She denies fever, chills, chest pain, SOB, palpitations, headache, vision changes, skin changes, vomiting, urinary symptoms, blood in stool, weight loss, or weakness.     She arrived to [facility] as a direct admission with plans to be started on IVF, anti-diarrheals, pain medications, and plans for stool studies and repeat ileoscopy. She was admitted to hospital medicine for symptomatic care and management of high ileostomy output and potential Crohn's flare with consult to IBD team.    Past  Medical History:   Diagnosis Date    Abnormal Pap smear 2007    Abnormal Pap smear 5/26/2011    Anemia     Anxiety     Arthritis     C. difficile diarrhea     Crohn's disease     Depression 8/5/2017    Encounter for blood transfusion     Genital HSV     History of colposcopy with cervical biopsy 2007 and 7/2011 2007-LYLA I  and 7/2011- LYLA I    Hypertension     Kidney stone     Kidney stone     Melanoma     Recurrent UTI 4/3/2013    S/P ileostomy 7/9/2012    Sterilization 6/23/2012       Past Surgical History:   Procedure Laterality Date    ABDOMINAL SURGERY      APPENDECTOMY      ARTHROPLASTY OF SHOULDER Left 7/18/2023    Procedure: ARTHROPLASTY, SHOULDER;  Surgeon: Sharon Babb MD;  Location: [facility];  Service: Orthopedics;  Laterality: Left;    AUGMENTATION OF BREAST Bilateral 06/2022    gel implants    BILATERAL SALPINGO-OOPHORECTOMY (BSO) Bilateral 05/30/2019    Procedure: SALPINGO-OOPHORECTOMY, BILATERAL;  Surgeon: Rupa German MD;  Location: [address];  Service: OB/GYN;  Laterality: Bilateral;    BLADDER SURGERY      partial cystectomy due to fistula    breast lift      BREAST SURGERY      [facility]      COLON SURGERY      COLONOSCOPY      CYSTOSCOPY  09/23/2020    Procedure: CYSTOSCOPY;  Surgeon: Sascha Florentino MD;  Location: [facility] OR [address] [place] [address];  Service: Urology;;    CYSTOSCOPY W/ URETERAL STENT PLACEMENT Left 09/15/2020    Procedure: CYSTOSCOPY, WITH URETERAL STENT INSERTION;  Surgeon: Sascha Florentino MD;  Location: [address];  Service: Urology;  Laterality: Left;    DIAGNOSTIC LAPAROSCOPY N/A 07/09/2020    Procedure: LAPAROSCOPY, DIAGNOSTIC;  Surgeon: Gilson El MD;  Location: [facility];  Service: OB/GYN;  Laterality: N/A;    ESOPHAGOGASTRODUODENOSCOPY N/A 1/3/2024    Procedure: EGD (ESOPHAGOGASTRODUODENOSCOPY);  Surgeon: Lily Lind MD;  Location: [address]);  Service: Endoscopy;  Laterality: N/A;    EXCISION OF MELANOMA  07/17/2019    ILEOSCOPY N/A 1/3/2024     Procedure: ILEOSCOPY;  Surgeon: Lily Lind MD;  Location: [facility] ENDO (2ND FLR);  Service: Endoscopy;  Laterality: N/A;    ILEOSTOMY      LAPAROSCOPIC LYSIS OF ADHESIONS N/A 07/09/2020    Procedure: LYSIS, ADHESIONS, LAPAROSCOPIC;  Surgeon: Gilson El MD;  [facility] OR;  Service: OB/GYN;  Laterality: N/A;    LASER LITHOTRIPSY  09/23/2020    Procedure: LITHOTRIPSY, USING LASER;  Surgeon: Sascha Florentino MD;  [facility];  Service: Urology;;    LYSIS OF ADHESIONS N/A 05/30/2019    Procedure: LYSIS, ADHESIONS;  Surgeon: Rupa German MD;  [facility];  Service: OB/GYN;  Laterality: N/A;    OOPHORECTOMY Right 04/16/2015    PORTACATH PLACEMENT  02/21/2017    SKIN BIOPSY      SMALL INTESTINE SURGERY      age 18 Y    TOTAL ABDOMINAL HYSTERECTOMY  04/16/2015    TOTAL COLECTOMY      TUBAL LIGATION  06/06/2012    UPPER GASTROINTESTINAL ENDOSCOPY      URETEROSCOPIC REMOVAL OF URETERIC CALCULUS  09/23/2020    Procedure: REMOVAL, CALCULUS, URETER, URETEROSCOPIC;  Surgeon: Sascha Florentino MD;  Location: [facility] OR [address] [place] [address];  Service: Urology;;    URETEROSCOPY Left 09/23/2020    Procedure: URETEROSCOPY;  Surgeon: Sascha Florentino MD;  [address];  Service: Urology;  Laterality: Left;       Review of patient's allergies indicates:   Allergen Reactions    Azathioprine sodium Other (See Comments)     Other reaction(s): pancreatitis  Other reaction(s): pancreatitis    Methotrexate analogues Other (See Comments)     leukopenia    Stelara [ustekinumab] Other (See Comments)     Multiple infections    Zofran [ondansetron hcl (pf)] Other (See Comments)     Per patient causes prolong QT    Vancomycin analogues Other (See Comments)     Made her red    Azathioprine      Other reaction(s): Unknown    Methotrexate      Other reaction(s): infection-    Morphine Itching and Other (See Comments)     Other reaction(s): Itching    Zofran [ondansetron hcl]      Other  reaction(s): Hives    Bactrim [sulfamethoxazole-trimethoprim] Palpitations    Ciprofloxacin Palpitations       No current facility-administered medications on file prior to encounter.     Current Outpatient Medications on File Prior to Encounter   Medication Sig    clonazePAM (KLONOPIN) 1 MG tablet Take 1 tablet (1 mg total) by mouth 2 (two) times daily. (Patient taking differently: Take 1 mg by mouth 2 (two) times daily as needed for Anxiety.)    cyclobenzaprine (FLEXERIL) 10 MG tablet Take 1 tablet (10 mg total) by mouth every evening as needed for muscle pain    droNABinol (MARINOL) 5 MG capsule Take 1 capsule (5 mg total) by mouth 2 (two) times daily before meals.    estradioL (ESTRACE) 0.01 % (0.1 mg/gram) vaginal cream Place 1 g vaginally once daily.    flu vacc [id number] 6mos up,PF, (FLUARIX QUAD [id number], PF,) 60 mcg (15 mcg x 4)/0.5 mL Syrg inject into muscle for one dose    fluconazole (DIFLUCAN) 150 MG Tab Take 1 tablet (150 mg total) by mouth every 72 hours as needed (vaginal yeast).    gabapentin (NEURONTIN) 300 MG capsule Take 1 capsule (300 mg total) by mouth 2 (two) times daily.    loperamide (IMODIUM) 2 mg capsule Take 1 capsule (2 mg total) by mouth 4 (four) times daily as needed for Diarrhea.    mirtazapine (REMERON SOL-TAB) 30 MG disintegrating tablet Take 1 tablet (30 mg total) by mouth nightly.    multivitamin (THERAGRAN) per tablet Take 1 tablet by mouth once daily.    nadoloL (CORGARD) 40 MG tablet Take 1 tablet (40 mg total) by mouth once daily. Patient needs appointment before next refill.    pantoprazole (PROTONIX) 40 MG tablet Take 1 tablet (40 mg total) by mouth 2 (two) times daily.    promethazine (PHENERGAN) 25 MG tablet Take 1 tablet (25 mg total) by mouth every 6 (six) hours as needed for Nausea.    tamsulosin (FLOMAX) 0.4 mg Cap Take 1 capsule (0.4 mg total) by mouth once daily.    triamcinolone acetonide 0.1% (KENALOG) 0.1 % cream Apply topically 2 (two) times daily.     valACYclovir (VALTREX) 500 MG tablet Take 1 tablet (500 mg total) by mouth once daily.    vedolizumab (ENTYVIO) 300 mg SolR injection Inject 300 mg into the vein.    zolpidem (AMBIEN) 10 mg Tab Take 1 tablet (10 mg total) by mouth every evening.     Family History       Problem Relation (Age of Onset)    Breast cancer Maternal Cousin (41)    Cancer Maternal Grandfather, Father    Colon cancer Father    Crohn's disease Brother, Daughter    Diabetes Paternal Grandfather, Maternal Grandfather    Endometrial cancer Maternal Aunt    Hearing loss Paternal Grandmother    Heart disease Maternal Grandfather    Hyperlipidemia Father    Hypertension Mother    Liver cancer Father    Skin cancer Maternal Grandfather          Tobacco Use    Smoking status: Never    Smokeless tobacco: Never   Substance and Sexual Activity    Alcohol use: Not Currently     Alcohol/week: 0.0 standard drinks of alcohol    Drug use: No    Sexual activity: Yes     Partners: Male     Birth control/protection: See Surgical Hx     Comment: HYST     Review of Systems   Constitutional:  Positive for appetite change. Negative for chills, fever and unexpected weight change.   HENT:  Negative for rhinorrhea and sore throat.    Eyes:  Negative for visual disturbance.   Respiratory:  Negative for cough, choking, chest tightness and shortness of breath.    Cardiovascular:  Negative for chest pain, palpitations and leg swelling.   Gastrointestinal:  Positive for abdominal pain and nausea. Negative for abdominal distention, blood in stool and vomiting.   Genitourinary:  Positive for flank pain. Negative for dysuria, frequency and urgency.   Musculoskeletal:  Positive for back pain.   Neurological:  Negative for weakness, light-headedness and headaches.   Psychiatric/Behavioral:  Negative for confusion and decreased concentration.      Objective:     Vital Signs (Most Recent):  Temp: 97.7 °F (36.5 °C) (01/16/24 1311)  Pulse: 72 (01/16/24 1311)  BP: 100/66 (01/16/24  1311)  SpO2: 99 % (01/16/24 1311) Vital Signs (24h Range):  Temp:  [97.7 °F (36.5 °C)-97.9 °F (36.6 °C)] 97.7 °F (36.5 °C)  Pulse:  [] 72  SpO2:  [97 %-100 %] 99 %  BP: (100-118)/(66-84) 100/66     Weight: 51 kg (112 lb 7 oz)  Body mass index is 21.24 kg/m².     Physical Exam  Vitals and nursing note reviewed.   Constitutional:       General: She is not in acute distress.     Appearance: Normal appearance. She is normal weight. She is not ill-appearing.   HENT:      Head: Normocephalic and atraumatic.      Mouth/Throat:      Mouth: Mucous membranes are moist.      Pharynx: Oropharynx is clear.   Eyes:      General: No scleral icterus.     Extraocular Movements: Extraocular movements intact.      Conjunctiva/sclera: Conjunctivae normal.      Pupils: Pupils are equal, round, and reactive to light.   Cardiovascular:      Rate and Rhythm: Normal rate and regular rhythm.      Pulses: Normal pulses.      Heart sounds: Normal heart sounds.   Pulmonary:      Effort: Pulmonary effort is normal. No respiratory distress.      Breath sounds: Normal breath sounds. No wheezing.   Abdominal:      General: Bowel sounds are normal. There is no distension.      Palpations: Abdomen is soft.      Tenderness: There is abdominal tenderness. There is no guarding or rebound.      Comments: Ileostomy bag in place, no erythema or signs of leakage. Site is clean, dry and intact   Musculoskeletal:         General: No tenderness.      Right lower leg: No edema.      Left lower leg: No edema.   Skin:     General: Skin is warm and dry.      Coloration: Skin is not pale.      Findings: No erythema or rash.   Neurological:      General: No focal deficit present.      Mental Status: She is alert and oriented to person, place, and time. Mental status is at baseline.      Cranial Nerves: No cranial nerve deficit.   Psychiatric:         Mood and Affect: Mood normal.         Behavior: Behavior normal.              CRANIAL NERVES     CN III, IV, VI    Pupils are equal, round, and reactive to light.       Significant Labs: All pertinent labs within the past 24 hours have been reviewed.    Significant Imaging: I have reviewed all pertinent imaging results/findings within the past 24 hours.  Assessment/Plan:     Nausea  - Prochlorperazine prn    Crohn's disease of both small and large intestine  43-year-old woman with PMH GERD, Crohn's s/p ileostomy presented to GI clinic with high ileostomy output 01/16 Recent admission to [facility] (12/31-1/7) with Crohn's flare.  Admission requested by GI (Dr. Peace Garcia) for IVF, ileostomy through stoma to see if Crohn's is worse, stool studies and treatment to get her output down.  Patient reports symptoms of nausea, abdominal pain, reduced output, and high ileostomy output to be similar to past Crohn's flares.   Upon transfer, HDS and AF. CBC unremarkable. CMP unremarkable. ESR 65. Mg 1.2. Phos 5.3. . CRP 1.8    Plan:  - Consulted GI/IBD Team  - Tentative plan for repeat Ileoscopy  - Stool studies  - IVF  - Anti-diarrheals  - Daily CBC, CMP, Mg, and Phos    Abdominal pain  - Morphine 4mg IV q6h prn severe pain  - Dilaudid 0.5 mg IV q6h breakthrough pain prn      VTE Risk Mitigation (From admission, onward)           Ordered     enoxaparin injection 40 mg  Every 24 hours         01/16/24 142     Reason for No Pharmacological VTE Prophylaxis  Once        Question:  Reasons:  Answer:  Risk of Bleeding    01/16/24 1335     IP VTE HIGH RISK PATIENT  Once         01/16/24 1335     Place sequential compression device  Until discontinued         01/16/24 1335                                    Lb Meadows DO  Department of Hospital Medicine  [facility]

## 2024-01-16 NOTE — ASSESSMENT & PLAN NOTE
Ivy Salgado is a 43 year old female for whom GI is consulted for IBD management. 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 GI/IBD consulted for further management of high ostomy output.    Endoscopies:   01/2024- EGD- mild gastritis  01/2024-Ileoscopy- 6 ulcers in the distal ileum.  8/2017- Ileoscopy- The examined portion of the ileum was normal.   Biopsied. Widely patent end ileostomy with healthy appearing mucosa at the ileostomy. Pathology showed fragments of unremarkable small bowel mucosa. No evidence of active colitis, granuloma, dysplasia or malignancy  10/2016- EGD- Normal esophagus. Small hiatus hernia. Erythematous mucosa in the gastric body. Biopsied. Trace of hematin in the gastric body.   Normal examined duodenum.  Two biopsies were obtained in the duodenal bulb. Four biopsies were obtained in the 2nd part of the duodenum.   10/2016- Ileoscopy- The examined portion of the ileum was normal. Biopsies without any acute abnormalities- CMV, HSV-1 AND HSV-2 stains performed. No celiac. No. H pylori. Normal mucosa of the small bowel.   10/2014- EGD- Normal Study. Biopsied to r/o Crohn's.    10/2014- Ileoscopy- The examined portion of the ileum was normal. Biopsies normal.  2/2014- SBE- A single (solitary) ulcer in the proximal ileum. Biopsied. Mild non-specific enteritis.   9/2013- Ileoscopy- Congested, eroded and ulcerated mucosa in the area at 5 cm proximal to the stoma. Biopsied. The examined portion of the ileum was normal otherwise  up to 30 cm. Biopsied. FRAGMENTS OF NONNEOPLASTIC SMALL INTESTINAL MUCOSA WITH NO ACTIVE INFLAMMATION, ULCERATION OR DYSPLASIA PRESENT. THERE IS PRESERVATION OF THE VILLOUS ARCHITECTURE. FRAGMENTS OF NONNEOPLASTIC SMALL INTESTINAL MUCOSA WITH NO ACTIVE INFLAMMATION, ULCERATION OR DYSPLASIA IDENTIFIED.  2013- EGD- Normal esophagus. Z-line regular, 36 cm from the incisors. A single gastric polyp. Resected and retrieved. A small amount of food (residue) in the stomach. Removal was successful. Mucosal abnormality in the duodenum. Biopsied. Mild chronic gastritis but otherwise negative.   2012- EGD- Normal Study.   2012- Colonoscopy- Crohn's colitis mostly involving the distal 30 cm. This was biopsied to r/o CMV, HSV. Mild ileocolonic anastomosis Crohn's disease. Chronic active colitis with surface ulceration, cryptitis, crypt abscess and reactive changes. Negative for CMV.     Imagin24 MRCP: No evidence of biliary obstruction.Punctate cystic focus in the pancreatic tail probably communicating with the main pancreatic duct, likely a side branch IPMN.  Too small to characterize although no worrisome features.  Follow-up in 1 year is recommended.  24 US liver with doppler: Mildly dilated bile ducts in the central right hepatic lobe.  Prominence of the common bile duct is better evaluated on CT 2023.  Follow-up/further evaluation as warranted clinically.  23 CT A/P with contrast-  There is decompression of a few scattered mid small bowel loops noting distension and slow flow of the distal small bowel and associated wall hyperemia. The ileostomy appears patent there is scattered interloop fluid and venous vascular engorgement involving the distended bowel loops, no findings to suggest abscess.     2021 MRE-no active inflammation.    Problem List:  Crohn's Disease of the small and large intestines, s/p total proctocolectomy with end ileostomy (), post-op course complicated by perineal  wound for ~1 year (now healed), currently off all therapies    Mild Malnutrition (Albumin 3.3)   Normocytic Anemia   GERD  History of colovesical fistula s/p operative management (1992)  History of abdominal abscess s/p operative management (1998)   Drug induced pancreatitis 2/2 Imuran   High ostomy output    Recommendations:  - Stool studies ordered to rule out active infection. Once infectious workup is negative, will likely start Budesonide.   - Will hold off on repeat endoscopy as she was just had an EGD/Ileoscopy on 1/03 and this is unlikely to  at this time.   - Agree with IVFs given high ostomy output and clinically dry appearing.   - Schedule Loperamide 2 mg QID for symptom control.  - DVT ppx with Lovenox ordered.   - IV morphine ordered for pain, okay to use given short acting nature.  - Would avoid use of NSAIDs.

## 2024-01-16 NOTE — ASSESSMENT & PLAN NOTE
43-year-old woman with PMH GERD, Crohn's s/p ileostomy presented to GI clinic with high ileostomy output 01/16 Recent [place] to [facility] ([place]-1/7) with Crohn's flare.  Admission requested by GI (Dr. Peace Garcia) for IVF, ileostomy through stoma to see if Crohn's is worse, stool studies and treatment to get her output down.  Patient reports symptoms of nausea, abdominal pain, reduced output, and high ileostomy output to be similar to past Crohn's flares.   Upon transfer, HDS and AF. CBC unremarkable. CMP unremarkable. ESR 65. Mg 1.2. Phos 5.3. . CRP 1.8    Plan:  - Consulted GI/IBD Team  - Tentative plan for repeat Ileoscopy  - Stool studies  - IVF  - Anti-diarrheals  - Daily CBC, CMP, Mg, and Phos

## 2024-01-16 NOTE — SUBJECTIVE & OBJECTIVE
Past Medical History:   Diagnosis Date    Abnormal Pap smear 2007    Abnormal Pap smear 5/26/2011    Anemia     Anxiety     Arthritis     C. difficile diarrhea     Crohn's disease     Depression 8/5/2017    Encounter for blood transfusion     Genital HSV     History of colposcopy with cervical biopsy 2007 and 7/2011 2007-LYLA I  and 7/2011- LYLA I    Hypertension     Kidney stone     Kidney stone     Melanoma     Recurrent UTI 4/3/2013    S/P ileostomy 7/9/2012    Sterilization 6/23/2012       Past Surgical History:   Procedure Laterality Date    ABDOMINAL SURGERY      APPENDECTOMY      ARTHROPLASTY OF SHOULDER Left 7/18/2023    Procedure: ARTHROPLASTY, SHOULDER;  Surgeon: Sharon Babb MD;  Location: [facility];  Service: Orthopedics;  Laterality: Left;    AUGMENTATION OF BREAST Bilateral 06/2022    gel implants    BILATERAL SALPINGO-OOPHORECTOMY (BSO) Bilateral 05/30/2019    Procedure: SALPINGO-OOPHORECTOMY, BILATERAL;  Surgeon: Rupa German MD;  Location: [address];  Service: OB/GYN;  Laterality: Bilateral;    BLADDER SURGERY      partial cystectomy due to fistula    breast lift      BREAST SURGERY      [facility]      COLON SURGERY      COLONOSCOPY      CYSTOSCOPY  09/23/2020    Procedure: CYSTOSCOPY;  Surgeon: Sascha Florentino MD;  Location: [address];  Service: Urology;;    CYSTOSCOPY W/ URETERAL STENT PLACEMENT Left 09/15/2020    Procedure: CYSTOSCOPY, WITH URETERAL STENT INSERTION;  Surgeon: Sascha Florentino MD;  Location: [address];  Service: Urology;  Laterality: Left;    DIAGNOSTIC LAPAROSCOPY N/A 07/09/2020    Procedure: LAPAROSCOPY, DIAGNOSTIC;  Surgeon: Gilson El MD;  Location: [facility];  Service: OB/GYN;  Laterality: N/A;    ESOPHAGOGASTRODUODENOSCOPY N/A 1/3/2024    Procedure: EGD (ESOPHAGOGASTRODUODENOSCOPY);  Surgeon: Lily Lind MD;  Location: [address]);  Service: Endoscopy;  Laterality: N/A;    EXCISION OF MELANOMA  07/17/2019    ILEOSCOPY N/A 1/3/2024     Procedure: ILEOSCOPY;  Surgeon: Lily Lind MD;  Location: [facility] ENDO (2ND FLR);  Service: Endoscopy;  Laterality: N/A;    ILEOSTOMY      LAPAROSCOPIC LYSIS OF ADHESIONS N/A 07/09/2020    Procedure: LYSIS, ADHESIONS, LAPAROSCOPIC;  Surgeon: Gilson El MD;  Location: [facility] OR;  Service: OB/GYN;  Laterality: N/A;    LASER LITHOTRIPSY  09/23/2020    Procedure: LITHOTRIPSY, USING LASER;  Surgeon: Sascha Florentino MD;  [facility];  Service: Urology;;    LYSIS OF ADHESIONS N/A 05/30/2019    Procedure: LYSIS, ADHESIONS;  Surgeon: Rupa German MD;  [facility];  Service: OB/GYN;  Laterality: N/A;    OOPHORECTOMY Right 04/16/2015    PORTACATH PLACEMENT  02/21/2017    SKIN BIOPSY      SMALL INTESTINE SURGERY      age 18 Y    TOTAL ABDOMINAL HYSTERECTOMY  04/16/2015    TOTAL COLECTOMY      TUBAL LIGATION  06/06/2012    UPPER GASTROINTESTINAL ENDOSCOPY      URETEROSCOPIC REMOVAL OF URETERIC CALCULUS  09/23/2020    Procedure: REMOVAL, CALCULUS, URETER, URETEROSCOPIC;  Surgeon: Sascha Florentino MD;  Location: [facility] OR [address] [place] [address];  Service: Urology;;    URETEROSCOPY Left 09/23/2020    Procedure: URETEROSCOPY;  Surgeon: Sascha Florentino MD;  [address];  Service: Urology;  Laterality: Left;       Review of patient's allergies indicates:   Allergen Reactions    Azathioprine sodium Other (See Comments)     Other reaction(s): pancreatitis  Other reaction(s): pancreatitis    Methotrexate analogues Other (See Comments)     leukopenia    Stelara [ustekinumab] Other (See Comments)     Multiple infections    Zofran [ondansetron hcl (pf)] Other (See Comments)     Per patient causes prolong QT    Vancomycin analogues Other (See Comments)     Made her red    Azathioprine      Other reaction(s): Unknown    Methotrexate      Other reaction(s): infection-    Morphine Itching and Other (See Comments)     Other reaction(s): Itching    Zofran [ondansetron hcl]      Other  reaction(s): Hives    Bactrim [sulfamethoxazole-trimethoprim] Palpitations    Ciprofloxacin Palpitations       No current facility-administered medications on file prior to encounter.     Current Outpatient Medications on File Prior to Encounter   Medication Sig    clonazePAM (KLONOPIN) 1 MG tablet Take 1 tablet (1 mg total) by mouth 2 (two) times daily. (Patient taking differently: Take 1 mg by mouth 2 (two) times daily as needed for Anxiety.)    cyclobenzaprine (FLEXERIL) 10 MG tablet Take 1 tablet (10 mg total) by mouth every evening as needed for muscle pain    droNABinol (MARINOL) 5 MG capsule Take 1 capsule (5 mg total) by mouth 2 (two) times daily before meals.    estradioL (ESTRACE) 0.01 % (0.1 mg/gram) vaginal cream Place 1 g vaginally once daily.    flu vacc [id number] 6mos up,PF, (FLUARIX QUAD [id number], PF,) 60 mcg (15 mcg x 4)/0.5 mL Syrg inject into muscle for one dose    fluconazole (DIFLUCAN) 150 MG Tab Take 1 tablet (150 mg total) by mouth every 72 hours as needed (vaginal yeast).    gabapentin (NEURONTIN) 300 MG capsule Take 1 capsule (300 mg total) by mouth 2 (two) times daily.    loperamide (IMODIUM) 2 mg capsule Take 1 capsule (2 mg total) by mouth 4 (four) times daily as needed for Diarrhea.    mirtazapine (REMERON SOL-TAB) 30 MG disintegrating tablet Take 1 tablet (30 mg total) by mouth nightly.    multivitamin (THERAGRAN) per tablet Take 1 tablet by mouth once daily.    nadoloL (CORGARD) 40 MG tablet Take 1 tablet (40 mg total) by mouth once daily. Patient needs appointment before next refill.    pantoprazole (PROTONIX) 40 MG tablet Take 1 tablet (40 mg total) by mouth 2 (two) times daily.    promethazine (PHENERGAN) 25 MG tablet Take 1 tablet (25 mg total) by mouth every 6 (six) hours as needed for Nausea.    tamsulosin (FLOMAX) 0.4 mg Cap Take 1 capsule (0.4 mg total) by mouth once daily.    triamcinolone acetonide 0.1% (KENALOG) 0.1 % cream Apply topically 2 (two) times daily.     valACYclovir (VALTREX) 500 MG tablet Take 1 tablet (500 mg total) by mouth once daily.    vedolizumab (ENTYVIO) 300 mg SolR injection Inject 300 mg into the vein.    zolpidem (AMBIEN) 10 mg Tab Take 1 tablet (10 mg total) by mouth every evening.     Family History       Problem Relation (Age of Onset)    Breast cancer Maternal Cousin (41)    Cancer Maternal Grandfather, Father    Colon cancer Father    Crohn's disease Brother, Daughter    Diabetes Paternal Grandfather, Maternal Grandfather    Endometrial cancer Maternal Aunt    Hearing loss Paternal Grandmother    Heart disease Maternal Grandfather    Hyperlipidemia Father    Hypertension Mother    Liver cancer Father    Skin cancer Maternal Grandfather          Tobacco Use    Smoking status: Never    Smokeless tobacco: Never   Substance and Sexual Activity    Alcohol use: Not Currently     Alcohol/week: 0.0 standard drinks of alcohol    Drug use: No    Sexual activity: Yes     Partners: Male     Birth control/protection: See Surgical Hx     Comment: HYST     Review of Systems   Constitutional:  Positive for appetite change. Negative for chills, fever and unexpected weight change.   HENT:  Negative for rhinorrhea and sore throat.    Eyes:  Negative for visual disturbance.   Respiratory:  Negative for cough, choking, chest tightness and shortness of breath.    Cardiovascular:  Negative for chest pain, palpitations and leg swelling.   Gastrointestinal:  Positive for abdominal pain and nausea. Negative for abdominal distention, blood in stool and vomiting.   Genitourinary:  Positive for flank pain. Negative for dysuria, frequency and urgency.   Musculoskeletal:  Positive for back pain.   Neurological:  Negative for weakness, light-headedness and headaches.   Psychiatric/Behavioral:  Negative for confusion and decreased concentration.      Objective:     Vital Signs (Most Recent):  Temp: 97.7 °F (36.5 °C) (01/16/24 1311)  Pulse: 72 (01/16/24 1311)  BP: 100/66 (01/16/24  1311)  SpO2: 99 % (01/16/24 1311) Vital Signs (24h Range):  Temp:  [97.7 °F (36.5 °C)-97.9 °F (36.6 °C)] 97.7 °F (36.5 °C)  Pulse:  [] 72  SpO2:  [97 %-100 %] 99 %  BP: (100-118)/(66-84) 100/66     Weight: 51 kg (112 lb 7 oz)  Body mass index is 21.24 kg/m².     Physical Exam  Vitals and nursing note reviewed.   Constitutional:       General: She is not in acute distress.     Appearance: Normal appearance. She is normal weight. She is not ill-appearing.   HENT:      Head: Normocephalic and atraumatic.      Mouth/Throat:      Mouth: Mucous membranes are moist.      Pharynx: Oropharynx is clear.   Eyes:      General: No scleral icterus.     Extraocular Movements: Extraocular movements intact.      Conjunctiva/sclera: Conjunctivae normal.      Pupils: Pupils are equal, round, and reactive to light.   Cardiovascular:      Rate and Rhythm: Normal rate and regular rhythm.      Pulses: Normal pulses.      Heart sounds: Normal heart sounds.   Pulmonary:      Effort: Pulmonary effort is normal. No respiratory distress.      Breath sounds: Normal breath sounds. No wheezing.   Abdominal:      General: Bowel sounds are normal. There is no distension.      Palpations: Abdomen is soft.      Tenderness: There is abdominal tenderness. There is no guarding or rebound.      Comments: Ileostomy bag in place, no erythema or signs of leakage. Site is clean, dry and intact   Musculoskeletal:         General: No tenderness.      Right lower leg: No edema.      Left lower leg: No edema.   Skin:     General: Skin is warm and dry.      Coloration: Skin is not pale.      Findings: No erythema or rash.   Neurological:      General: No focal deficit present.      Mental Status: She is alert and oriented to person, place, and time. Mental status is at baseline.      Cranial Nerves: No cranial nerve deficit.   Psychiatric:         Mood and Affect: Mood normal.         Behavior: Behavior normal.              CRANIAL NERVES     CN III, IV, VI    Pupils are equal, round, and reactive to light.       Significant Labs: All pertinent labs within the past 24 hours have been reviewed.    Significant Imaging: I have reviewed all pertinent imaging results/findings within the past 24 hours.

## 2024-01-16 NOTE — PLAN OF CARE
Problem: Pain Acute  Goal: Acceptable Pain Control and Functional Ability  Outcome: Ongoing, Progressing     Problem: Fall Injury Risk  Goal: Absence of Fall and Fall-Related Injury  Outcome: Ongoing, Progressing     Problem: Adult Inpatient Plan of Care  Goal: Absence of Hospital-Acquired Illness or Injury  Outcome: Ongoing, Progressing  Goal: Optimal Comfort and Wellbeing  Outcome: Ongoing, Progressing  Goal: Readiness for Transition of Care  Outcome: Ongoing, Progressing     Problem: Infection  Goal: Absence of Infection Signs and Symptoms  Outcome: Ongoing, Progressing

## 2024-01-16 NOTE — HPI
Ivy Salgado is a 43 year old female for whom GI is consulted for IBD management. 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 GI/IBD consulted for further management of high ostomy output.    She was recently admitted from 12/31 to 1/07 with high ileostomy output and liver enzyme elevation; stool infectious workup negative. She underwent EGD with mild gastritis and ileoscopy with 6 apthous ulcers in the distal ileum. She had some dilation of the bile ducts in the central right hepatic lobe. MRCP showed punctate cystic focus in the pancreatic tail, likely a side IPMN. Prior to discharge, she did have an increase in her ostomy output which improved with scheduled imodium. Over the past 4 to 5 days, she has had an increase in her ileostomy output, which she notes as green, nonbloody. She has been unable to keep up with her fluid intake and has not had an appetite. Associated symptoms include persistent right sided abdominal pain. She was seen in IBD clinic on 1/16 with recommendations for hospital admission. Denies fevers, chills, vomiting, bloody stools. On arrival, she was afebrile and HDS. On labs on admission. She was admitted to Hospital Medicine further workup.      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 Prescribed Marinol by GI.      Surgical History:   1992- Unclear, colon resection to address fistulous disease process  1998- Unclear, colon resection to address abscess   2012- S/p total proctocolectomy with end ileostomy, post-op course complicated by perineal wound which she followed with [facility] for ~ 1 year (Dr. 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      Family History:   Colon Cancer- Father, diagnosed in 40's, s/p resection and chemo   IBD- Brother diagnosed with Crohn's at age 18 and daughter recently diagnosed with Crohn's at age 15; recent psych notes with noted depression 2/2 daughter's diagnosis, daughter is currently doing well on Humira q4 weeks      Previous Crohn's Therapies:   Entyvio/Vedolizumab- most recent therapy, was on this from 1142-8975 and then stopped as she was getting recurrent UTI's/pyelonephritis   Stelara/Ustekinumab- was on this prior to Entyvio and had multiple infections   Cimzia/Certolizumab- stopped working after some years, unclear on dates, records note she was on this in 2016   Remicade/Inflixmab- stopped working after many years, unsure about antibodies   Humira/Adalimumab- Suspected drug-induced lupus due to joint pains  Prednisone tapers- effective, caused avascular necrosis to her left shoulder requiring arthroplasty   Imuran/Azathioprine- stopped due to drug-induced pancreatitis    Methotrexate- stopped due to significant leukopenia  Sulfasalazine at some point prior to   Entocort- at some point prior to    Canasa- at some point prior to      Adjunctive Medical Therapies:  Marinol- prescribed by Dr. Ross, effective  Mirtazapine- for ARPITA/Depression, somewhat effective  Wellbutrin- recently tried after seeing psych, but switched back as it caused increased output from her ostomy   Octreotide- at some point received this in the mid  for increased output, not currently      Imagin23 CT A/P with contrast-  There is decompression of a few scattered mid small bowel loops noting distension and slow flow of the distal small bowel and associated wall hyperemia.  The ileostomy appears patent there is scattered interloop fluid and venous vascular engorgement involving the distended bowel loops, no findings to suggest abscess.     2021 MRE-no active inflammation.     Endoscopies:   2024- EGD- mild gastritis  2024-Ileoscopy- 6 ulcers in the distal ileum.  2017- Ileoscopy- The examined portion of the ileum was normal.   Biopsied. Widely patent end ileostomy with healthy appearing mucosa at the ileostomy. Pathology showed fragments of unremarkable small bowel mucosa. No evidence of active colitis, granuloma, dysplasia or malignancy  10/2016- EGD- Normal esophagus. Small hiatus hernia. Erythematous mucosa in the gastric body. Biopsied. Trace of hematin in the gastric body.   Normal examined duodenum.  Two biopsies were obtained in the duodenal bulb. Four biopsies were obtained in the 2nd part of the duodenum.   10/2016- Ileoscopy- The examined portion of the ileum was normal. Biopsies without any acute abnormalities- CMV, HSV-1 AND HSV-2 stains performed. No celiac. No. H pylori. Normal mucosa of the small bowel.   10/2014- EGD- Normal Study. Biopsied to r/o Crohn's.    10/2014- Ileoscopy- The examined portion of the ileum was normal. Biopsies normal.  2014- SBE- A  single (solitary) ulcer in the proximal ileum. Biopsied. Mild non-specific enteritis.   9/2013- Ileoscopy- Congested, eroded and ulcerated mucosa in the area at 5 cm proximal to the stoma. Biopsied. The examined portion of the ileum was normal otherwise up to 30 cm. Biopsied. FRAGMENTS OF NONNEOPLASTIC SMALL INTESTINAL MUCOSA WITH NO ACTIVE INFLAMMATION, ULCERATION OR DYSPLASIA PRESENT. THERE IS PRESERVATION OF THE VILLOUS ARCHITECTURE. FRAGMENTS OF NONNEOPLASTIC SMALL INTESTINAL MUCOSA WITH NO ACTIVE INFLAMMATION, ULCERATION OR DYSPLASIA IDENTIFIED.  9/2013- EGD- Normal esophagus. Z-line regular, 36 cm from the incisors. A single gastric polyp. Resected and retrieved. A small amount of food (residue) in the stomach. Removal was successful. Mucosal abnormality in the duodenum. Biopsied. Mild chronic gastritis but otherwise negative.   5/2012- EGD- Normal Study.   5/2012- Colonoscopy- Crohn's colitis mostly involving the distal 30 cm. This was biopsied to r/o CMV, HSV. Mild ileocolonic anastomosis Crohn's disease. Chronic active colitis with surface ulceration, cryptitis, crypt abscess and reactive changes. Negative for CMV.

## 2024-01-17 LAB
ALBUMIN SERPL BCP-MCNC: 2.9 G/DL (ref 3.5–5.2)
ALP SERPL-CCNC: 131 U/L (ref 55–135)
ALT SERPL W/O P-5'-P-CCNC: 13 U/L (ref 10–44)
ANION GAP SERPL CALC-SCNC: 5 MMOL/L (ref 8–16)
AST SERPL-CCNC: 17 U/L (ref 10–40)
BASOPHILS # BLD AUTO: 0.04 K/UL (ref 0–0.2)
BASOPHILS NFR BLD: 0.7 % (ref 0–1.9)
BILIRUB SERPL-MCNC: 0.2 MG/DL (ref 0.1–1)
BUN SERPL-MCNC: 10 MG/DL (ref 6–20)
C DIFF GDH STL QL: NEGATIVE
C DIFF TOX A+B STL QL IA: NEGATIVE
CALCIUM SERPL-MCNC: 9 MG/DL (ref 8.7–10.5)
CHLORIDE SERPL-SCNC: 109 MMOL/L (ref 95–110)
CO2 SERPL-SCNC: 24 MMOL/L (ref 23–29)
CREAT SERPL-MCNC: 0.7 MG/DL (ref 0.5–1.4)
DIFFERENTIAL METHOD BLD: ABNORMAL
EOSINOPHIL # BLD AUTO: 0.2 K/UL (ref 0–0.5)
EOSINOPHIL NFR BLD: 3.9 % (ref 0–8)
ERYTHROCYTE [DISTWIDTH] IN BLOOD BY AUTOMATED COUNT: 13.2 % (ref 11.5–14.5)
EST. GFR  (NO RACE VARIABLE): >60 ML/MIN/1.73 M^2
GLUCOSE SERPL-MCNC: 96 MG/DL (ref 70–110)
HCT VFR BLD AUTO: 32.6 % (ref 37–48.5)
HGB BLD-MCNC: 10.4 G/DL (ref 12–16)
IMM GRANULOCYTES # BLD AUTO: 0.01 K/UL (ref 0–0.04)
IMM GRANULOCYTES NFR BLD AUTO: 0.2 % (ref 0–0.5)
LYMPHOCYTES # BLD AUTO: 2.5 K/UL (ref 1–4.8)
LYMPHOCYTES NFR BLD: 43.5 % (ref 18–48)
MAGNESIUM SERPL-MCNC: 1.6 MG/DL (ref 1.6–2.6)
MCH RBC QN AUTO: 29 PG (ref 27–31)
MCHC RBC AUTO-ENTMCNC: 31.9 G/DL (ref 32–36)
MCV RBC AUTO: 91 FL (ref 82–98)
MONOCYTES # BLD AUTO: 0.7 K/UL (ref 0.3–1)
MONOCYTES NFR BLD: 12.4 % (ref 4–15)
NEUTROPHILS # BLD AUTO: 2.2 K/UL (ref 1.8–7.7)
NEUTROPHILS NFR BLD: 39.3 % (ref 38–73)
NRBC BLD-RTO: 0 /100 WBC
PHOSPHATE SERPL-MCNC: 5 MG/DL (ref 2.7–4.5)
PLATELET # BLD AUTO: 367 K/UL (ref 150–450)
PMV BLD AUTO: 9.3 FL (ref 9.2–12.9)
POTASSIUM SERPL-SCNC: 4.1 MMOL/L (ref 3.5–5.1)
PROT SERPL-MCNC: 6.6 G/DL (ref 6–8.4)
RBC # BLD AUTO: 3.59 M/UL (ref 4–5.4)
SODIUM SERPL-SCNC: 138 MMOL/L (ref 136–145)
WBC # BLD AUTO: 5.63 K/UL (ref 3.9–12.7)

## 2024-01-17 PROCEDURE — 85025 COMPLETE CBC W/AUTO DIFF WBC: CPT

## 2024-01-17 PROCEDURE — 25000003 PHARM REV CODE 250

## 2024-01-17 PROCEDURE — 63600175 PHARM REV CODE 636 W HCPCS

## 2024-01-17 PROCEDURE — 11000001 HC ACUTE MED/SURG PRIVATE ROOM

## 2024-01-17 PROCEDURE — 25000003 PHARM REV CODE 250: Performed by: STUDENT IN AN ORGANIZED HEALTH CARE EDUCATION/TRAINING PROGRAM

## 2024-01-17 PROCEDURE — 36415 COLL VENOUS BLD VENIPUNCTURE: CPT

## 2024-01-17 PROCEDURE — 80053 COMPREHEN METABOLIC PANEL: CPT

## 2024-01-17 PROCEDURE — 83735 ASSAY OF MAGNESIUM: CPT

## 2024-01-17 PROCEDURE — 84100 ASSAY OF PHOSPHORUS: CPT

## 2024-01-17 RX ORDER — MAGNESIUM SULFATE HEPTAHYDRATE 40 MG/ML
2 INJECTION, SOLUTION INTRAVENOUS ONCE
Status: COMPLETED | OUTPATIENT
Start: 2024-01-17 | End: 2024-01-17

## 2024-01-17 RX ORDER — LOPERAMIDE HCL 1MG/7.5ML
2 LIQUID (ML) ORAL 4 TIMES DAILY
Status: DISCONTINUED | OUTPATIENT
Start: 2024-01-17 | End: 2024-01-18

## 2024-01-17 RX ORDER — SEVELAMER CARBONATE 800 MG/1
800 TABLET, FILM COATED ORAL
Status: DISCONTINUED | OUTPATIENT
Start: 2024-01-17 | End: 2024-01-17

## 2024-01-17 RX ADMIN — SEVELAMER CARBONATE 800 MG: 800 TABLET, FILM COATED ORAL at 12:01

## 2024-01-17 RX ADMIN — MORPHINE SULFATE 4 MG: 4 INJECTION INTRAVENOUS at 09:01

## 2024-01-17 RX ADMIN — SODIUM CHLORIDE, SODIUM LACTATE, POTASSIUM CHLORIDE, AND CALCIUM CHLORIDE: 600; 310; 30; 20 INJECTION, SOLUTION INTRAVENOUS at 12:01

## 2024-01-17 RX ADMIN — MORPHINE SULFATE 4 MG: 4 INJECTION INTRAVENOUS at 06:01

## 2024-01-17 RX ADMIN — PROMETHAZINE HYDROCHLORIDE 25 MG: 25 TABLET ORAL at 09:01

## 2024-01-17 RX ADMIN — CLONAZEPAM 1 MG: 0.5 TABLET ORAL at 09:01

## 2024-01-17 RX ADMIN — GABAPENTIN 300 MG: 300 CAPSULE ORAL at 09:01

## 2024-01-17 RX ADMIN — PANTOPRAZOLE SODIUM 40 MG: 40 TABLET, DELAYED RELEASE ORAL at 09:01

## 2024-01-17 RX ADMIN — DRONABINOL 5 MG: 2.5 CAPSULE ORAL at 04:01

## 2024-01-17 RX ADMIN — LOPERAMIDE HYDROCHLORIDE 2 MG: 1 SOLUTION ORAL at 09:01

## 2024-01-17 RX ADMIN — ENOXAPARIN SODIUM 40 MG: 40 INJECTION SUBCUTANEOUS at 04:01

## 2024-01-17 RX ADMIN — DRONABINOL 5 MG: 2.5 CAPSULE ORAL at 06:01

## 2024-01-17 RX ADMIN — HYDROMORPHONE HYDROCHLORIDE 0.5 MG: 0.5 INJECTION, SOLUTION INTRAMUSCULAR; INTRAVENOUS; SUBCUTANEOUS at 01:01

## 2024-01-17 RX ADMIN — MIRTAZAPINE 30 MG: 15 TABLET, ORALLY DISINTEGRATING ORAL at 09:01

## 2024-01-17 RX ADMIN — LOPERAMIDE HYDROCHLORIDE 2 MG: 2 CAPSULE ORAL at 12:01

## 2024-01-17 RX ADMIN — LOPERAMIDE HYDROCHLORIDE 2 MG: 2 CAPSULE ORAL at 09:01

## 2024-01-17 RX ADMIN — Medication 6 MG: at 09:01

## 2024-01-17 RX ADMIN — HYDROMORPHONE HYDROCHLORIDE 0.5 MG: 0.5 INJECTION, SOLUTION INTRAMUSCULAR; INTRAVENOUS; SUBCUTANEOUS at 04:01

## 2024-01-17 RX ADMIN — LOPERAMIDE HYDROCHLORIDE 2 MG: 1 SOLUTION ORAL at 06:01

## 2024-01-17 RX ADMIN — HYDROMORPHONE HYDROCHLORIDE 0.5 MG: 0.5 INJECTION, SOLUTION INTRAMUSCULAR; INTRAVENOUS; SUBCUTANEOUS at 10:01

## 2024-01-17 RX ADMIN — MORPHINE SULFATE 4 MG: 4 INJECTION INTRAVENOUS at 01:01

## 2024-01-17 RX ADMIN — PROMETHAZINE HYDROCHLORIDE 25 MG: 25 TABLET ORAL at 04:01

## 2024-01-17 RX ADMIN — MAGNESIUM SULFATE HEPTAHYDRATE 2 G: 40 INJECTION, SOLUTION INTRAVENOUS at 09:01

## 2024-01-17 RX ADMIN — THERA TABS 1 TABLET: TAB at 09:01

## 2024-01-17 NOTE — NURSING
Nurses Note -- 4 Eyes      1/16/2024   6:47 PM      Skin assessed during: Admit      [x] No Altered Skin Integrity Present    [x]Prevention Measures Documented      [] Yes- Altered Skin Integrity Present or Discovered   [] LDA Added if Not in Epic (Describe Wound)   [] New Altered Skin Integrity was Present on Admit and Documented in LDA   [] Wound Image Taken    Wound Care Consulted? No    Attending Nurse:  Stephen Donaldson RN/Staff Member:  MIKE Fisher

## 2024-01-17 NOTE — CONSULTS
Pt seen for wound care consult. Pt stated she is independent in ostomy care and does not need any additional supplies. Wound care will sign off, re-consult as needed.

## 2024-01-17 NOTE — HOSPITAL COURSE
Patient was admitted as a direct admit from GI clinic due to high ileostomy output. She was treated with continuous IVF, anti-diarrheals, and pain medications as needed. GI/IBD were consulted, recommended continued supportive treatment and IV Solumedrol. Initially there were no plans for repeat ileoscopy as she recently completed one during last admission, biopsy from which showed active enteritis with ulceration, however patient with continued pain and high output. Ileoscopy completed on 01/24 and was significant only for a solitary ulcer 19 cm proximal to the stoma. CDiff studies negative, E. Coli Ag negative, and Stool culture with no growth. Steroids were transitioned to Prednisone 40 mg daily and then to be tapered down. On 01/27, patient was medically cleared for discharge with better appetite, improved pain and improved ileostomy output.

## 2024-01-17 NOTE — PLAN OF CARE
[facility]  Initial Discharge Assessment       Primary Care Provider: Luis Madden DO    Admission Diagnosis: Crohn's disease, unspecified, without complications [K50.90]  Crohn's colitis [K50.10]    Admission Date: 1/16/2024  Expected Discharge Date:     Transition of Care Barriers: None    Payor: [employer]IT / Plan: [employer] EMPLOYEE [employer] [place] / Product Type: Self Funded /     Extended Emergency Contact Information  Primary Emergency Contact: Milan Salgado  Address: [address] [place] [address] [id number] [place] [address]  Home Phone: [phone]  Mobile Phone: [phone]  Relation: Father  Secondary Emergency Contact: Shaila Salgado   [place]  Mobile Phone: [phone]  Relation: Mother    Discharge Plan A: Home with family  Discharge Plan B: Home      [facility]  [address]  [place] [address]  Phone: [phone] Fax: [phone]    [facility] [address] [address]  Phone: [phone] Fax: [phone]      Initial Assessment (most recent)       Adult Discharge Assessment - 01/17/24 0935          Discharge Assessment    Assessment Type Discharge Planning Assessment     Confirmed/corrected address, phone number and insurance Yes     Confirmed Demographics Correct on Facesheet     Source of Information patient     Does patient/caregiver understand observation status Yes     Communicated CHRISTOPH with patient/caregiver Yes     Reason For Admission Chron's flare     People in Home child(katalina), dependent     Facility Arrived From: Home     Do you expect to return to your current living situation? Yes     Do you have help at home or someone to help you manage your care at home? Yes     Who are your caregiver(s) and their phone number(s)? SO. Father     Prior to hospitilization cognitive status: Alert/Oriented     Current cognitive status: Alert/Oriented     Walking or Climbing  Stairs Difficulty no     Dressing/Bathing Difficulty no     Home Layout Able to live on 1st floor     Equipment Currently Used at Home none     Readmission within 30 days? No     Patient currently being followed by outpatient case management? No     Do you currently have service(s) that help you manage your care at home? No     Do you take prescription medications? Yes     Do you have prescription coverage? Yes     Do you have any problems affording any of your prescribed medications? No     Is the patient taking medications as prescribed? yes     Who is going to help you get home at discharge? Family     How do you get to doctors appointments? car, drives self     Are you on dialysis? No     Do you take coumadin? No     Discharge Plan A Home with family     Discharge Plan B Home     DME Needed Upon Discharge  none     Discharge Plan discussed with: Patient     Transition of Care Barriers None        Physical Activity    On average, how many days per week do you engage in moderate to strenuous exercise (like a brisk walk)? 3 days     On average, how many minutes do you engage in exercise at this level? 30 min        Financial Resource Strain    How hard is it for you to pay for the very basics like food, housing, medical care, and heating? Not hard at all        Housing Stability    In the last 12 months, was there a time when you were not able to pay the mortgage or rent on time? No     In the last 12 months, how many places have you lived? 1     In the last 12 months, was there a time when you did not have a steady place to sleep or slept in a shelter (including now)? No        Transportation Needs    In the past 12 months, has lack of transportation kept you from medical appointments or from getting medications? No     In the past 12 months, has lack of transportation kept you from meetings, work, or from getting things needed for daily living? No        Food Insecurity    Within the past 12 months, you worried  that your food would run out before you got the money to buy more. Never true     Within the past 12 months, the food you bought just didn't last and you didn't have money to get more. Never true        Stress    Do you feel stress - tense, restless, nervous, or anxious, or unable to sleep at night because your mind is troubled all the time - these days? Not at all        Social Connections    In a typical week, how many times do you talk on the phone with family, friends, or neighbors? More than three times a week     How often do you get together with friends or relatives? More than three times a week     How often do you attend Adventism or Gnosticism services? Patient declined     Do you belong to any clubs or organizations such as Adventism groups, unions, fraternal or athletic groups, or school groups? Patient declined     How often do you attend meetings of the clubs or organizations you belong to? Patient declined     Are you , , , , never , or living with a partner? Never         Alcohol Use    Q1: How often do you have a drink containing alcohol? Patient declined     Q2: How many drinks containing alcohol do you have on a typical day when you are drinking? Patient declined     Q3: How often do you have six or more drinks on one occasion? Patient declined                      Discharge Plan A and Plan B have been determined by review of patient's clinical status, future medical and therapeutic needs, and coverage/benefits for post-acute care in coordination with multidisciplinary team members.

## 2024-01-17 NOTE — MEDICAL/APP STUDENT
[facility] Medicine Student   Progress Note  [facility] [place] [place] [facility]    Admit Date: 1/16/2024  Hospital Day: 8  01/22/2024  8:59 AM    SUBJECTIVE:   Ms. Ivy Salgado is a 43 y.o. female w/ Crohn's disease of both small and large intestine s/p total proctocolectomy & end ileostomy (2012), GERD, ARPITA, who is being followed up for high ileostomy output suspected to be do to Crohn's flare    Interval history: .NAEON. Reports doing ok this morning. Reports that she feels like she should completely feel better by now based on previous times she had flares and got steroids. but 6/10 abdominal pain localized to behind and around ileostomy bag site and ranges from 6-8 out of 10; 6 with morphine 4 mg q4 hrs. Abdominal pain isn't constant anymore. Aggravated by eating.   Minimal Bleeding at stump; she just wanted to let us know.   Some GERD sx last night-- could be d/t her being able to tolerate more food and eating more  Nausea improved; requesting promethazine less freq    I let her know she can request anti-diarrheal PRN: diphenoxylate-atropine 2.5-0.025 mg/5 ml liquid 5 mL and that it may help with the pain as well. But she still hasn't requested  it  Pt complains of facial flushing; cheeks red/hot    who is being followed up for Crohn's disease of both small and large intestine.     ROS    Please refer to the H&P for past medical, family, and social history.    OBJECTIVE:     Vital Signs Recent:  Temp: 97.9 °F (36.6 °C) (01/24/24 1024)  Pulse: 63 (01/24/24 0934)  Resp: 16 (01/24/24 0934)  BP: (!) 145/96 (01/24/24 0934)  SpO2: 100 % (01/24/24 0934)  Oxygen Documentation:                Device (Oxygen Therapy): room air         Vital Signs Range (Last 24H):  Temp:  [97.4 °F (36.3 °C)-98.1 °F (36.7 °C)]   Pulse:  [58-78]   Resp:  [14-18]   BP: (114-145)/(71-96)   SpO2:  [96 %-100 %]        I & O (Last 24H):No intake or output data in the 24 hours ending 01/22/24 0859     Physical Exam:  Physical Exam    Labs:   Recent Labs   Lab  01/20/24 0307 01/21/24  0705 01/22/24 0433    140 140   K 4.1 4.7 4.2    108 110   CO2 24 25 25   BUN 14 13 15   CREATININE 0.7 0.7 0.6    115* 115*   CALCIUM 9.2 9.6 8.9   MG 1.9 1.9 1.9   PHOS 3.2 4.0 2.9     Recent Labs   Lab 01/20/24 0307 01/21/24  0705 01/22/24 0433   ALKPHOS 138* 129 116   ALT 14 13 10   AST 22 13 9*   ALBUMIN 3.0* 3.3* 3.1*   PROT 7.0 7.6 6.8   BILITOT 0.2 0.2 0.1     Recent Labs   Lab 01/20/24 0307 01/21/24 0705 01/22/24 0433   WBC 8.46 8.91 10.57   HGB 9.8* 10.4* 10.1*   HCT 31.3* 33.1* 31.8*    390 350     Diagnostic Results:  Glucose slightly elevated; could be due to starting solu-medrol.  summarize any imaging, cardiac tests, and micro information.  Delete it after 24 hours and do not keep copy/pasting old information.    Blood cx: NGTD x4    Scheduled Meds:   droNABinol  5 mg Oral BID AC    enoxparin  40 mg Subcutaneous Q24H (prophylaxis, 1700)    gabapentin  300 mg Oral BID    loperamide  4 mg Oral QID    methylPREDNISolone sodium succinate injection  20 mg Intravenous TID    mirtazapine  30 mg Oral Nightly    multivitamin  1 tablet Oral Daily    nadoloL  40 mg Oral Daily    pantoprazole  40 mg Oral BID     Continuous Infusions:   lactated ringers 100 mL/hr at 01/22/24 0131     PRN Meds:clonazePAM, dextrose 10%, dextrose 10%, diphenoxylate-atropine 2.5-0.025 mg/5 ml, glucagon (human recombinant), glucose, glucose, melatonin, morphine, morphine, naloxone, prochlorperazine, promethazine, sodium chloride 0.9%    ASSESSMENT/PLAN:   Ms. Ivy Salgado is a 43 y.o. female with a relevant medical history of *** who is being followed up for Crohn's disease of both small and large intestine.    Active Hospital Problems    Diagnosis  POA    *Crohn's disease of both small and large intestine [K50.80]  Yes     Chronic    Gastroesophageal reflux disease [K21.9]  Yes     Chronic    Nausea [R11.0]  Yes    High output ileostomy [R19.8, Z93.2]  Not Applicable     "Abdominal pain [R10.9]  Yes    Generalized anxiety disorder [F41.1]  Yes     Chronic    S/P ileostomy [Z93.2]  Not Applicable     Chronic      Resolved Hospital Problems   No resolved problems to display.       *** Always prioritize your diagnosis, make an assessment of each diagnosis (stable/unstable or controlled/uncontrolled), and what you want to do and why. In a follow-up note list problems you are monitoring daily to include HTN or DM, but DO NOT include diseases that we are not monitoring daily such as osteoporosis or obesity.  1.Primary Diagnosis  ASSESSMENT:  Narrative of your Assessment.  Consider adding clinical reasoning to support your Assessment and Hospital Course if needed.  PLAN:  -what treatments or tests you are doing today in outline  -what treatments or tests you are doing today in outline    2. Secondary Diagnosis  ASSESSMENT: Narrative of your Assessment.  Consider adding clinical reasoning to support your Assessment and Hospital Course if needed.  PLAN:  -what treatments or tests you are doing today in outline  -what treatments or tests you are doing today in outline    3. Et cetera.  Last Problem is usually DVT Prophylaxis.    Discharge planning:   *** Most teams like you to mention what criteria we need for the patient to be discharged (e.g. afebrile for 24 hours) and the Disposition (discharge plans such as home or rehab). Code status may be mentioned here as well.      Ivy Salgado is a 44 y/o F w/ Crohn's disease of both small and large intestine s/p total proctocolectomy and end ileostomy (2012), GERD, ARPITA here for symptomatic care & mx of high ileostomy output and potential Crohn's flare. She was admitted yesterday at the request of her GI doctor (Dr. Peace Garcia) for ongoing high ileostomy output.     NICKI. Pt reports feeling "a little better than the past few days" abdominal pain went from 8/10 to 6/10, but no change in her ileostomy output, sx of decreased appetite, heartburn. She " noted that her output was green in color, which hasn't happened before. Before that it has been clear yellow like urine for the past few days. She has had to change her whole ileostomy bag more frequently; previously every 5-7 days, but recently every 2 days d/t leakage underneath.    Abdominal pain localized to behind and around ileostomy bag site. Exacerbated by eating.  Nausea has been ongoing for years.    Pain medication: at home was taking hydrocodone 7.5 mg daily for the past month; prior to that was on oxycodone 10mg.     Of note, pt was recently hospitalized 12/31 - 01/07 for similar presentation. Ileoscopy   Bx proven crohn's flare.     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  She tried multiple meds including imuran (pancreatitis, remicade (secondary nonresponder), humira (DIL), MTX (leukopenia).  She met Dr. Cameron initially in GI clinic at [facility] 5/2009 at which time she had some diarrhea and on pred 30 mg/d. Colonoscopy 6/2009 significant for diffuse proctosigmoiditis with remainder of colon normla and end to end ileocolonic anastomsis with inflammation. Placed on rowasa enemas but too expensive so switched to steroid enemas.  Flex sig 10/2009 ongoing proctosigmoiditis.          Patient hospitalized with high ileostomy output with elevated LFTs and stool studies neg for infection and EGD significant for some mild gastritis and ileoscopy through stoma with about 6 apthous ulcers int he distal 20 cm of ileum.  Abd US significant for mildly dilated bile ducts in the central right hepatic lobe and the patient has uptrending transaminitis without elevated T reji and MRCP with punctate cystic focus in pancreatic tail, likely a side IPMN.       - follow up in 1 year. Hepatology consulted: awaiting serology results. Pt with soft pressures - giving  "gentle IVFs with improvement. Pt output and pain well controlled. GI with plans to follow up with patient in clinic and restart entyvio. Patient medically ready for discharge. Plan to follow up with PCP, GI, hepatology. Return precautions provided. Plan of care discussed with patient, patient agreeable with plan, and all questions answered.         Hepatology consulted during hospital stay and at that time if was felt that the differential is broad for cause of liver enzyme elevation and plans for serological workup and follow up and if continued elevation then liver bx would be considered.       Afebrile  Pulse range     Satting well onroom air  Weight: 51 kg (112 lb 7 oz)    Hb 11.8 - but stable; same as yesterday    Bicarb decreased today at 21  Hgb 10.4 - stable; same as yesterday  Slightly lower than yesterday due to dilution  Hemoconcentration     Skip   Plt 367  <150 or >500    Wbc  No leukocytosis  "Leukocytosis of _#_"    Albumin 3.0   Calcium 9.1  Corrected calcium 9.9    Cr  BUN  LFT    E. Coli 0157 antigen: in process  Stool culture: in process  C. Difficile (antigen & toxins A+B, EIA): NEGATIVE    If antigen is (--), toxin will always be (--)  If antigen is (+),   toxin may be (+) but may not be being picked up  Reflexes automatically for PCR  So if PCR is (+) is pt has C diff  If PCR is (-) they had C diff ; means not an active infection  If toxin is (+) it's an active infection    Pancreatic elastase, fecal: in process  Fecal fat, qualitative: in process  GI pathogens panel, PCR: in process  Calprotectin, stool: in process  CRP: 1.8 (<8.2); normal  ESR: 65 (<36); elevated  Phos: 5.0 (<4.5); elevated  Phos worried at 6.5 so dont have to act on it just yet  Phos   Ma.0 (<2.6); normal  AlkPhos  Urinalysis: not infectious  No pending blood cx  imaging:    Assessment & Plan   So for our pt Ivy Salgado, a 44 y/o F w/ Crohn disease s/p ileostomy, the plan for her high Ileostomy output/Crohn's flare " is tx w/ budesonide (oral steroid) to induce remission. She has been on day 5   Crohn's disease ? flare ? high ileostomy output  Loperamide (liquid form: 1mg/7.5mL soln 4 mg) 4x a day, oral  Continuing IVF  Manage acute flare: by inducing remission w/ solu-medrol 20mg IV TID  (anti-inflammatory glucocorticoid, which contains methylprednisolone sodium succinate as the active ingredient)  A formulation of the synthetic steroid budesonide that is released in environments with a pH ?5.5. This facilitates drug delivery distal to the proximal small bowel. Used to treat Crohn disease that involves sites of inflammation in the ileum &/or ascending colon.  For maintenance of remission: consider anti-TNF-? antibodies    Continue sx-ix tx for:  ·       Nausea  Promethazine (phenergan) tablet 25 mg PRN  Prochlorperazine (1st gen anti-psychotic) PRN  Dronabinol capsule 5 mg BID before meals (anti-emetic & appetite stimulant)  ·       Abdominal pain  Morphine 4 mg IV q6h prn severe pain ? changed to q4h     ^ d/c ^ and just give morphine more freq   ·       GERD  Pantoprazole (protonix) EC tablet 40 mg BID  Pantoprazole reduces the amount of acid your stomach makes. It's used for heartburn, acid reflux and    DVT ppx w/ lovenox (enoxaparin injection 40 mg subQ)     Is to d/c home w/ family    Crohn's disease of both small and large intestine  Ivy Salgado is a 43 year old female for whom GI is consulted for IBD management. 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 GI/IBD consulted for further management of high ostomy output.    Endoscopies:   01/2024- EGD- mild gastritis  01/2024-Ileoscopy- 6 ulcers in the distal ileum.  8/2017- Ileoscopy- The examined portion of the ileum was normal.   Biopsied. Widely patent end ileostomy with healthy appearing mucosa at the ileostomy. Pathology showed fragments of unremarkable small bowel mucosa. No evidence of active colitis, granuloma, dysplasia or malignancy  10/2016- EGD- Normal esophagus. Small hiatus hernia. Erythematous mucosa in the gastric body. Biopsied. Trace of hematin in the gastric body.   Normal examined duodenum.  Two biopsies were obtained in the duodenal bulb. Four biopsies were obtained in the 2nd part of the duodenum.   10/2016- Ileoscopy- The examined portion of the ileum was normal. Biopsies without any acute abnormalities- CMV, HSV-1 AND HSV-2 stains performed. No celiac. No. H pylori. Normal mucosa of the small bowel.   10/2014- EGD- Normal Study. Biopsied to r/o Crohn's.    10/2014- Ileoscopy- The examined portion of the ileum was normal. Biopsies normal.  2/2014- SBE- A single (solitary) ulcer in the proximal ileum. Biopsied. Mild non-specific enteritis.   9/2013- Ileoscopy- Congested, eroded and ulcerated mucosa in the area at 5 cm proximal to the stoma. Biopsied. The examined portion of the ileum was normal otherwise up to 30 cm. Biopsied. FRAGMENTS OF NONNEOPLASTIC SMALL INTESTINAL MUCOSA WITH NO ACTIVE INFLAMMATION, ULCERATION OR DYSPLASIA PRESENT. THERE IS PRESERVATION OF THE VILLOUS ARCHITECTURE. FRAGMENTS OF NONNEOPLASTIC SMALL INTESTINAL MUCOSA WITH NO ACTIVE INFLAMMATION, ULCERATION OR DYSPLASIA IDENTIFIED.  9/2013- EGD- Normal esophagus. Z-line regular, 36 cm from the incisors. A single gastric polyp. Resected and retrieved. A small amount of food (residue) in the stomach. Removal was successful. Mucosal abnormality in the duodenum. Biopsied. Mild chronic gastritis but otherwise negative.    2012- EGD- Normal Study.   2012- Colonoscopy- Crohn's colitis mostly involving the distal 30 cm. This was biopsied to r/o CMV, HSV. Mild ileocolonic anastomosis Crohn's disease. Chronic active colitis with surface ulceration, cryptitis, crypt abscess and reactive changes. Negative for CMV.     Imagin24 MRCP: No evidence of biliary obstruction.Punctate cystic focus in the pancreatic tail probably communicating with the main pancreatic duct, likely a side branch IPMN.  Too small to characterize although no worrisome features.  Follow-up in 1 year is recommended.  24 US liver with doppler: Mildly dilated bile ducts in the central right hepatic lobe.  Prominence of the common bile duct is better evaluated on CT 2023.  Follow-up/further evaluation as warranted clinically.  23 CT A/P with contrast-  There is decompression of a few scattered mid small bowel loops noting distension and slow flow of the distal small bowel and associated wall hyperemia. The ileostomy appears patent there is scattered interloop fluid and venous vascular engorgement involving the distended bowel loops, no findings to suggest abscess.     2021 MRE-no active inflammation.    Problem List:  Crohn's Disease of the small and large intestines, s/p total proctocolectomy with end ileostomy (), post-op course complicated by perineal wound for ~1 year (now healed), currently off all therapies    Mild Malnutrition (Albumin 3.3)   Normocytic Anemia   GERD  History of colovesical fistula s/p operative management ()  History of abdominal abscess s/p operative management ()   Drug induced pancreatitis 2/2 Imuran   High ostomy output    Recommendations:  - Stool studies ordered to rule out active infection. Once infectious workup is negative, will likely start Budesonide.   - Will hold off on repeat endoscopy as she was just had an EGD/Ileoscopy on  and this is unlikely to  at this time.   -  Agree with IVFs given high ostomy output and clinically dry appearing.   - Schedule Loperamide 2 mg QID for symptom control.  - DVT ppx with Lovenox ordered.   - IV morphine ordered for pain, okay to use given short acting nature.  - Would avoid use of NSAIDs.       43-year-old woman with PMH GERD, Crohn's s/p ileostomy presented to GI clinic with high ileostomy output 01/16 [place] [facility] [place] with Crohn's flare.  Admission requested by GI (Dr. Peace Garcia) for IVF, ileostomy through stoma to see if Crohn's is worse, stool studies and treatment to get her output down.  Patient reports symptoms of nausea, abdominal pain, reduced output, and high ileostomy output to be similar to past Crohn's flares.   Upon transfer, HDS and AF. CBC unremarkable. CMP unremarkable. ESR 65. Mg 1.2. Phos 5.3. . CRP 1.8    Plan:  - Consulted GI/IBD Team  - Tentative plan for repeat Ileoscopy  - Stool studies  - IVF  - Anti-diarrheals  - Daily CBC, CMP, Mg, and Phos    Nausea  - Prochlorperazine prn    Abdominal pain  - Morphine 4mg IV q6h prn severe pain  - Dilaudid 0.5 mg IV q6h breakthrough pain prn      - Morphine 4mg IV q6h prn severe pain  - Dilaudid 0.5 mg IV q6h breakthrough pain prn    Crohn's disease of both small and large intestine  43-year-old woman with PMH GERD, Crohn's s/p ileostomy presented to GI clinic with high ileostomy output 01/16 [place] [facility] [place] with Crohn's flare.  Admission requested by GI (Dr. Peace Garcia) for IVF, ileostomy through stoma to see if Crohn's is worse, stool studies and treatment to get her output down.  Patient reports symptoms of nausea, abdominal pain, reduced output, and high ileostomy output to be similar to past Crohn's flares.   Upon transfer, HDS and AF. CBC unremarkable. CMP unremarkable. ESR 65. Mg 1.2. Phos 5.3. . CRP 1.8    Plan:  - Consulted GI/IBD Team; not recommending repeat ileosocpy  - Stool studies pending  - C.Diff negative  -  IVF  - Anti-diarrheals  - Daily CBC, CMP, Mg, and Phos     Admitted for high ileostomy output suspected for crohn's flare

## 2024-01-17 NOTE — PROGRESS NOTES
[facility] Medicine  Progress Note    Patient Name: Ivy Salgado  MRN: [id number]  Patient Class: IP- Inpatient   Admission Date: 1/16/2024  Length of Stay: 1 days  Attending Physician: Jamison Horton MD  Primary Care Provider: Luis Madden DO        Subjective:     Principal Problem:<principal problem not specified>        HPI:  Patient is a 44 yo female with PMH of Crohn's disease s/p ileostomy, GERD, and ARPITA who presented from GI clinic at the request for admission from Dr. Peace Garcia for ongoing high ileostomy output. Of note, she was recently hospitalized 12/31-01/07 for similar presentation. She is reporting generalized 8/10 abdominal pain, nausea, high ileostomy output, decreased appetite,and heartburn. She states her symptoms are similar the symptoms she used to have with Crohn's flares in the past, but she reports she hasn't had a flare since 2020. She reports normally emptying her ileostomy bag 2-3 times after meals, but in the last few weeks, she has had to continuously empty the bag as high as 12 times even when not eating meals. Her appetite has been decreased, reporting only eating half of her normal intake. During her recent admission, she had a formal workup with EGD/ileoscopy and stool studies that were largely unrevealing aside from multiple ulcers in the prepyloric region. She denies fever, chills, chest pain, SOB, palpitations, headache, vision changes, skin changes, vomiting, urinary symptoms, blood in stool, weight loss, or weakness.     She arrived to [facility] as a direct admission with plans to be started on IVF, anti-diarrheals, pain medications, and plans for stool studies and repeat ileoscopy. She was admitted to hospital medicine for symptomatic care and management of high ileostomy output and potential Crohn's flare with consult to IBD team.    Overview/Hospital Course:  Patient was admitted as a direct admit from GI clinic due to high ileostomy output. She was  treated with continuous IVF, anti-diarrheals, and pain medications as needed. GI/IBD was consulted, and they evaluated her and did not recommend additional ileoscopy. Patient continuing to be treated symptomatically.    Interval History: NAEON. AFVSS. Patient reports adequate pain control, improved appetite, and thickening of Ileostomy bag contents. She states she is still emptying the bag more frequently than usual but she feels it's improving and may be starting to slow down. IBD team not recommending ileoscopy; continuing symptomatic treatment.    Review of Systems  Objective:     Vital Signs (Most Recent):  Temp: 98 °F (36.7 °C) (01/17/24 0742)  Pulse: 68 (01/17/24 0742)  Resp: 18 (01/17/24 1317)  BP: 116/72 (01/17/24 0742)  SpO2: 99 % (01/17/24 0742) Vital Signs (24h Range):  Temp:  [97.8 °F (36.6 °C)-98.2 °F (36.8 °C)] 98 °F (36.7 °C)  Pulse:  [64-71] 68  Resp:  [16-18] 18  SpO2:  [98 %-99 %] 99 %  BP: ()/(58-72) 116/72     Weight: 51 kg (112 lb 7 oz)  Body mass index is 21.24 kg/m².    Intake/Output Summary (Last 24 hours) at 1/17/2024 1604  Last data filed at 1/17/2024 0546  Gross per 24 hour   Intake 240 ml   Output 1002 ml   Net -762 ml         Physical Exam  Vitals and nursing note reviewed.   Constitutional:       General: She is not in acute distress.     Appearance: Normal appearance. She is normal weight. She is not ill-appearing.   HENT:      Head: Normocephalic and atraumatic.      Mouth/Throat:      Mouth: Mucous membranes are moist.      Pharynx: Oropharynx is clear.   Eyes:      General: No scleral icterus.     Extraocular Movements: Extraocular movements intact.      Conjunctiva/sclera: Conjunctivae normal.      Pupils: Pupils are equal, round, and reactive to light.   Cardiovascular:      Rate and Rhythm: Normal rate and regular rhythm.      Pulses: Normal pulses.      Heart sounds: Normal heart sounds.   Pulmonary:      Effort: Pulmonary effort is normal. No respiratory distress.       Breath sounds: Normal breath sounds. No wheezing.   Abdominal:      General: Bowel sounds are normal. There is no distension.      Palpations: Abdomen is soft.      Tenderness: There is abdominal tenderness. There is no guarding or rebound.      Comments: Ileostomy bag in place, no erythema or signs of leakage. Site is clean, dry and intact   Musculoskeletal:         General: No tenderness.      Right lower leg: No edema.      Left lower leg: No edema.   Skin:     General: Skin is warm and dry.      Coloration: Skin is not pale.      Findings: No erythema or rash.   Neurological:      General: No focal deficit present.      Mental Status: She is alert and oriented to person, place, and time. Mental status is at baseline.      Cranial Nerves: No cranial nerve deficit.   Psychiatric:         Mood and Affect: Mood normal.         Behavior: Behavior normal.             Significant Labs: All pertinent labs within the past 24 hours have been reviewed.    Significant Imaging: I have reviewed all pertinent imaging results/findings within the past 24 hours.    Assessment/Plan:      Nausea  - Prochlorperazine prn    Crohn's disease of both small and large intestine  43-year-old woman with PMH GERD, Crohn's s/p ileostomy presented to GI clinic with high ileostomy output 01/16 Recent admission to [facility] (12/31-1/7) with Crohn's flare.  Admission requested by GI (Dr. Peace Garcia) for IVF, ileostomy through stoma to see if Crohn's is worse, stool studies and treatment to get her output down.  Patient reports symptoms of nausea, abdominal pain, reduced output, and high ileostomy output to be similar to past Crohn's flares.   Upon transfer, HDS and AF. CBC unremarkable. CMP unremarkable. ESR 65. Mg 1.2. Phos 5.3. . CRP 1.8    Plan:  - Consulted GI/IBD Team; not recommending repeat ileosocpy  - Stool studies pending  - C.Diff negative  - IVF  - Anti-diarrheals  - Daily CBC, CMP, Mg, and Phos    Abdominal pain  - Morphine 4mg IV  q6h prn severe pain  - Dilaudid 0.5 mg IV q6h breakthrough pain prn      VTE Risk Mitigation (From admission, onward)           Ordered     enoxaparin injection 40 mg  Every 24 hours         01/16/24 1429     Reason for No Pharmacological VTE Prophylaxis  Once        Question:  Reasons:  Answer:  Risk of Bleeding    01/16/24 1335     IP VTE HIGH RISK PATIENT  Once         01/16/24 1335     Place sequential compression device  Until discontinued         01/16/24 1335                    Discharge Planning   CHRISTOPH:      Code Status: Full Code   Is the patient medically ready for discharge?:     Reason for patient still in hospital (select all that apply): Patient trending condition  Discharge Plan A: Home with family                  Lb Meadows DO  Department of Hospital Medicine   [facility] Surg

## 2024-01-17 NOTE — ASSESSMENT & PLAN NOTE
43-year-old woman with PMH GERD, Crohn's s/p ileostomy presented to GI clinic with high ileostomy output 01/16 Recent admission to [facility] (12/31-1/7) with Crohn's flare.  Admission requested by GI (Dr. Peace Garcia) for IVF, ileostomy through stoma to see if Crohn's is worse, stool studies and treatment to get her output down.  Patient reports symptoms of nausea, abdominal pain, reduced output, and high ileostomy output to be similar to past Crohn's flares.   Upon transfer, HDS and AF. CBC unremarkable. CMP unremarkable. ESR 65. Mg 1.2. Phos 5.3. . CRP 1.8    Plan:  - Consulted GI/IBD Team; not recommending repeat ileosocpy  - Stool studies pending  - C.Diff negative  - IVF  - Anti-diarrheals  - Daily CBC, CMP, Mg, and Phos

## 2024-01-17 NOTE — SUBJECTIVE & OBJECTIVE
Interval History: NAEON. AFVSS. Patient reports adequate pain control, improved appetite, and thickening of Ileostomy bag contents. She states she is still emptying the bag more frequently than usual but she feels it's improving and may be starting to slow down. IBD team not recommending ileoscopy; continuing symptomatic treatment.    Review of Systems  Objective:     Vital Signs (Most Recent):  Temp: 98 °F (36.7 °C) (01/17/24 0742)  Pulse: 68 (01/17/24 0742)  Resp: 18 (01/17/24 1317)  BP: 116/72 (01/17/24 0742)  SpO2: 99 % (01/17/24 0742) Vital Signs (24h Range):  Temp:  [97.8 °F (36.6 °C)-98.2 °F (36.8 °C)] 98 °F (36.7 °C)  Pulse:  [64-71] 68  Resp:  [16-18] 18  SpO2:  [98 %-99 %] 99 %  BP: ()/(58-72) 116/72     Weight: 51 kg (112 lb 7 oz)  Body mass index is 21.24 kg/m².    Intake/Output Summary (Last 24 hours) at 1/17/2024 1604  Last data filed at 1/17/2024 0546  Gross per 24 hour   Intake 240 ml   Output 1002 ml   Net -762 ml         Physical Exam  Vitals and nursing note reviewed.   Constitutional:       General: She is not in acute distress.     Appearance: Normal appearance. She is normal weight. She is not ill-appearing.   HENT:      Head: Normocephalic and atraumatic.      Mouth/Throat:      Mouth: Mucous membranes are moist.      Pharynx: Oropharynx is clear.   Eyes:      General: No scleral icterus.     Extraocular Movements: Extraocular movements intact.      Conjunctiva/sclera: Conjunctivae normal.      Pupils: Pupils are equal, round, and reactive to light.   Cardiovascular:      Rate and Rhythm: Normal rate and regular rhythm.      Pulses: Normal pulses.      Heart sounds: Normal heart sounds.   Pulmonary:      Effort: Pulmonary effort is normal. No respiratory distress.      Breath sounds: Normal breath sounds. No wheezing.   Abdominal:      General: Bowel sounds are normal. There is no distension.      Palpations: Abdomen is soft.      Tenderness: There is abdominal tenderness. There is no  guarding or rebound.      Comments: Ileostomy bag in place, no erythema or signs of leakage. Site is clean, dry and intact   Musculoskeletal:         General: No tenderness.      Right lower leg: No edema.      Left lower leg: No edema.   Skin:     General: Skin is warm and dry.      Coloration: Skin is not pale.      Findings: No erythema or rash.   Neurological:      General: No focal deficit present.      Mental Status: She is alert and oriented to person, place, and time. Mental status is at baseline.      Cranial Nerves: No cranial nerve deficit.   Psychiatric:         Mood and Affect: Mood normal.         Behavior: Behavior normal.             Significant Labs: All pertinent labs within the past 24 hours have been reviewed.    Significant Imaging: I have reviewed all pertinent imaging results/findings within the past 24 hours.

## 2024-01-17 NOTE — TREATMENT PLAN
GI Treatment Plan    Ivy Salgado is a 43 y.o. female admitted to hospital 1/16/2024 (Hospital Day: 2) due to <principal problem not specified>.     Interval History  1 L of stool output overnight, which seems to be improving. Consistency is also better. C diff negative. Switching loperamide to liquid form.     Objective  Temp:  [97.8 °F (36.6 °C)-98.2 °F (36.8 °C)] 98 °F (36.7 °C) (01/17 0742)  Pulse:  [64-71] 68 (01/17 0742)  BP: ()/(58-72) 116/72 (01/17 0742)  Resp:  [16-18] 18 (01/17 1317)  SpO2:  [98 %-99 %] 99 % (01/17 0742)    General: Alert, Oriented x3, no distress    Laboratory    Recent Labs   Lab 01/16/24  1511 01/17/24  0546   HGB 10.6* 10.4*       Lab Results   Component Value Date    WBC 5.63 01/17/2024    HGB 10.4 (L) 01/17/2024    HCT 32.6 (L) 01/17/2024    MCV 91 01/17/2024     01/17/2024       Lab Results   Component Value Date     01/17/2024    K 4.1 01/17/2024     01/17/2024    CO2 24 01/17/2024    BUN 10 01/17/2024    CREATININE 0.7 01/17/2024    CALCIUM 9.0 01/17/2024    ANIONGAP 5 (L) 01/17/2024    ESTGFRAFRICA >60.0 03/24/2022    EGFRNONAA >60.0 03/24/2022       Lab Results   Component Value Date    ALT 13 01/17/2024    AST 17 01/17/2024    ALKPHOS 131 01/17/2024    BILITOT 0.2 01/17/2024       Lab Results   Component Value Date    INR 1.0 01/10/2024    INR 1.0 01/07/2024    INR 1.0 01/06/2024   Crohn's disease of both small and large intestine  Ivy Salgado is a 43 year old female for whom GI is consulted for IBD management. 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 GI/IBD consulted for further management of high ostomy output.     Endoscopies:   01/2024- EGD- mild gastritis  01/2024-Ileoscopy- 6 ulcers in the distal ileum.  8/2017- Ileoscopy- The examined portion of the ileum was normal.   Biopsied. Widely patent end ileostomy with healthy appearing mucosa at the ileostomy. Pathology showed fragments of unremarkable small bowel mucosa. No evidence of active colitis, granuloma, dysplasia or malignancy  10/2016- EGD- Normal esophagus. Small hiatus hernia. Erythematous mucosa in the gastric body. Biopsied. Trace of hematin in the gastric body. Normal examined duodenum.  Two biopsies were obtained in the duodenal bulb. Four biopsies were obtained in the 2nd part of the duodenum.   10/2016- Ileoscopy- The examined portion of the ileum was normal. Biopsies without any acute abnormalities- CMV, HSV-1 AND HSV-2 stains performed. No celiac. No. H pylori. Normal mucosa of the small bowel.   10/2014- EGD- Normal Study. Biopsied to r/o Crohn's.    10/2014- Ileoscopy- The examined portion of the ileum was normal. Biopsies normal.  2/2014- SBE- A single (solitary) ulcer in the proximal ileum. Biopsied. Mild non-specific enteritis.   9/2013- Ileoscopy- Congested, eroded and ulcerated mucosa in the area at 5 cm proximal to the stoma. Biopsied. The examined portion of the ileum was normal otherwise up to 30 cm. Biopsied. FRAGMENTS OF NONNEOPLASTIC SMALL INTESTINAL MUCOSA WITH NO ACTIVE INFLAMMATION, ULCERATION OR DYSPLASIA PRESENT. THERE IS PRESERVATION OF THE VILLOUS ARCHITECTURE. FRAGMENTS OF NONNEOPLASTIC SMALL INTESTINAL MUCOSA WITH NO ACTIVE INFLAMMATION, ULCERATION OR DYSPLASIA IDENTIFIED.  9/2013- EGD- Normal esophagus. Z-line regular, 36 cm from the incisors. A single gastric polyp. Resected and retrieved. A small amount of food (residue) in the stomach. Removal was successful. Mucosal abnormality in  the duodenum. Biopsied. Mild chronic gastritis but otherwise negative.   2012- EGD- Normal Study.   2012- Colonoscopy- Crohn's colitis mostly involving the distal 30 cm. This was biopsied to r/o CMV, HSV. Mild ileocolonic anastomosis Crohn's disease. Chronic active colitis with surface ulceration, cryptitis, crypt abscess and reactive changes. Negative for CMV.      Imagin24 MRCP: No evidence of biliary obstruction.Punctate cystic focus in the pancreatic tail probably communicating with the main pancreatic duct, likely a side branch IPMN.  Too small to characterize although no worrisome features.  Follow-up in 1 year is recommended.  24 US liver with doppler: Mildly dilated bile ducts in the central right hepatic lobe.  Prominence of the common bile duct is better evaluated on CT 2023.  Follow-up/further evaluation as warranted clinically.  23 CT A/P with contrast-  There is decompression of a few scattered mid small bowel loops noting distension and slow flow of the distal small bowel and associated wall hyperemia. The ileostomy appears patent there is scattered interloop fluid and venous vascular engorgement involving the distended bowel loops, no findings to suggest abscess.     2021 MRE-no active inflammation.     Problem List:  Crohn's Disease of the small and large intestines, s/p total proctocolectomy with end ileostomy (), post-op course complicated by perineal wound for ~1 year (now healed), currently off all therapies    Mild Malnutrition (Albumin 3.3)   Normocytic Anemia   GERD  History of colovesical fistula s/p operative management ()  History of abdominal abscess s/p operative management ()   Drug induced pancreatitis 2/2 Imuran   High ostomy output     Recommendations:  - Follow up stool studies; C diff negative so far. Once infectious workup is negative and if symptoms not improving, may start Budesonide.   - Will hold off on repeat endoscopy as she was  just had an EGD/Ileoscopy on 1/03 and this is unlikely to  at this time.   - Agree with IVFs given high ostomy output and clinically dry appearing.   - Schedule Loperamide 2 mg QID liquid form for symptom control.  - DVT ppx with Lovenox ordered.   - IV morphine ordered for pain, okay to use given short acting nature. Would avoid escalating beyond this.  - Renvela discontinued as this may actually worsening GI sxs.   - Would avoid use of NSAIDs.   - We will continue to follow.    Thank you for involving us in the care of Ivy Salgado. Please call with any additional questions, concerns or changes in the patient's clinical status.    Beronica Holguin MD  Gastroenterology

## 2024-01-18 ENCOUNTER — PATIENT MESSAGE (OUTPATIENT)
Dept: GASTROENTEROLOGY | Facility: CLINIC | Age: 44
End: 2024-01-18
Payer: COMMERCIAL

## 2024-01-18 LAB
ALBUMIN SERPL BCP-MCNC: 3 G/DL (ref 3.5–5.2)
ALP SERPL-CCNC: 138 U/L (ref 55–135)
ALT SERPL W/O P-5'-P-CCNC: 13 U/L (ref 10–44)
ANION GAP SERPL CALC-SCNC: 10 MMOL/L (ref 8–16)
AST SERPL-CCNC: 19 U/L (ref 10–40)
BASOPHILS # BLD AUTO: 0.04 K/UL (ref 0–0.2)
BASOPHILS NFR BLD: 0.7 % (ref 0–1.9)
BILIRUB SERPL-MCNC: 0.2 MG/DL (ref 0.1–1)
BUN SERPL-MCNC: 12 MG/DL (ref 6–20)
CALCIUM SERPL-MCNC: 9.1 MG/DL (ref 8.7–10.5)
CHLORIDE SERPL-SCNC: 105 MMOL/L (ref 95–110)
CO2 SERPL-SCNC: 21 MMOL/L (ref 23–29)
CREAT SERPL-MCNC: 0.7 MG/DL (ref 0.5–1.4)
DIFFERENTIAL METHOD BLD: ABNORMAL
E COLI SXT1 STL QL IA: NEGATIVE
E COLI SXT2 STL QL IA: NEGATIVE
EOSINOPHIL # BLD AUTO: 0.2 K/UL (ref 0–0.5)
EOSINOPHIL NFR BLD: 2.9 % (ref 0–8)
ERYTHROCYTE [DISTWIDTH] IN BLOOD BY AUTOMATED COUNT: 12.9 % (ref 11.5–14.5)
EST. GFR  (NO RACE VARIABLE): >60 ML/MIN/1.73 M^2
GLUCOSE SERPL-MCNC: 98 MG/DL (ref 70–110)
HCT VFR BLD AUTO: 35.9 % (ref 37–48.5)
HGB BLD-MCNC: 11.8 G/DL (ref 12–16)
IMM GRANULOCYTES # BLD AUTO: 0.01 K/UL (ref 0–0.04)
IMM GRANULOCYTES NFR BLD AUTO: 0.2 % (ref 0–0.5)
LYMPHOCYTES # BLD AUTO: 2.1 K/UL (ref 1–4.8)
LYMPHOCYTES NFR BLD: 35.8 % (ref 18–48)
MAGNESIUM SERPL-MCNC: 2 MG/DL (ref 1.6–2.6)
MCH RBC QN AUTO: 29 PG (ref 27–31)
MCHC RBC AUTO-ENTMCNC: 32.9 G/DL (ref 32–36)
MCV RBC AUTO: 88 FL (ref 82–98)
MONOCYTES # BLD AUTO: 0.7 K/UL (ref 0.3–1)
MONOCYTES NFR BLD: 11.5 % (ref 4–15)
NEUTROPHILS # BLD AUTO: 2.9 K/UL (ref 1.8–7.7)
NEUTROPHILS NFR BLD: 48.9 % (ref 38–73)
NRBC BLD-RTO: 0 /100 WBC
PHOSPHATE SERPL-MCNC: 4.9 MG/DL (ref 2.7–4.5)
PLATELET # BLD AUTO: 341 K/UL (ref 150–450)
PMV BLD AUTO: 9.6 FL (ref 9.2–12.9)
POTASSIUM SERPL-SCNC: 4.6 MMOL/L (ref 3.5–5.1)
PROT SERPL-MCNC: 6.9 G/DL (ref 6–8.4)
RBC # BLD AUTO: 4.07 M/UL (ref 4–5.4)
SODIUM SERPL-SCNC: 136 MMOL/L (ref 136–145)
WBC # BLD AUTO: 5.84 K/UL (ref 3.9–12.7)

## 2024-01-18 PROCEDURE — 25000003 PHARM REV CODE 250

## 2024-01-18 PROCEDURE — 80053 COMPREHEN METABOLIC PANEL: CPT

## 2024-01-18 PROCEDURE — 99232 SBSQ HOSP IP/OBS MODERATE 35: CPT | Mod: ,,,

## 2024-01-18 PROCEDURE — 63600175 PHARM REV CODE 636 W HCPCS

## 2024-01-18 PROCEDURE — 85025 COMPLETE CBC W/AUTO DIFF WBC: CPT

## 2024-01-18 PROCEDURE — 87040 BLOOD CULTURE FOR BACTERIA: CPT

## 2024-01-18 PROCEDURE — 36415 COLL VENOUS BLD VENIPUNCTURE: CPT

## 2024-01-18 PROCEDURE — 83735 ASSAY OF MAGNESIUM: CPT

## 2024-01-18 PROCEDURE — 11000001 HC ACUTE MED/SURG PRIVATE ROOM

## 2024-01-18 PROCEDURE — 84100 ASSAY OF PHOSPHORUS: CPT

## 2024-01-18 PROCEDURE — 63600175 PHARM REV CODE 636 W HCPCS: Performed by: STUDENT IN AN ORGANIZED HEALTH CARE EDUCATION/TRAINING PROGRAM

## 2024-01-18 RX ORDER — MORPHINE SULFATE 4 MG/ML
4 INJECTION, SOLUTION INTRAMUSCULAR; INTRAVENOUS EVERY 4 HOURS PRN
Status: DISCONTINUED | OUTPATIENT
Start: 2024-01-18 | End: 2024-01-24

## 2024-01-18 RX ORDER — DIPHENOXYLATE HYDROCHLORIDE AND ATROPINE SULFATE 2.5; .025 MG/5ML; MG/5ML
5 SOLUTION ORAL 4 TIMES DAILY PRN
Status: DISCONTINUED | OUTPATIENT
Start: 2024-01-18 | End: 2024-01-27 | Stop reason: HOSPADM

## 2024-01-18 RX ORDER — PROCHLORPERAZINE EDISYLATE 5 MG/ML
2.5 INJECTION INTRAMUSCULAR; INTRAVENOUS EVERY 6 HOURS PRN
Status: DISCONTINUED | OUTPATIENT
Start: 2024-01-18 | End: 2024-01-27 | Stop reason: HOSPADM

## 2024-01-18 RX ORDER — LOPERAMIDE HCL 1MG/7.5ML
4 LIQUID (ML) ORAL 4 TIMES DAILY
Status: DISCONTINUED | OUTPATIENT
Start: 2024-01-18 | End: 2024-01-27 | Stop reason: HOSPADM

## 2024-01-18 RX ORDER — MORPHINE SULFATE 2 MG/ML
2 INJECTION, SOLUTION INTRAMUSCULAR; INTRAVENOUS EVERY 6 HOURS PRN
Status: DISCONTINUED | OUTPATIENT
Start: 2024-01-18 | End: 2024-01-19

## 2024-01-18 RX ADMIN — LOPERAMIDE HYDROCHLORIDE 4 MG: 1 SOLUTION ORAL at 12:01

## 2024-01-18 RX ADMIN — PANTOPRAZOLE SODIUM 40 MG: 40 TABLET, DELAYED RELEASE ORAL at 08:01

## 2024-01-18 RX ADMIN — GABAPENTIN 300 MG: 300 CAPSULE ORAL at 09:01

## 2024-01-18 RX ADMIN — ENOXAPARIN SODIUM 40 MG: 40 INJECTION SUBCUTANEOUS at 04:01

## 2024-01-18 RX ADMIN — LOPERAMIDE HYDROCHLORIDE 4 MG: 1 SOLUTION ORAL at 09:01

## 2024-01-18 RX ADMIN — MORPHINE SULFATE 4 MG: 4 INJECTION INTRAVENOUS at 09:01

## 2024-01-18 RX ADMIN — PROMETHAZINE HYDROCHLORIDE 25 MG: 25 TABLET ORAL at 08:01

## 2024-01-18 RX ADMIN — GABAPENTIN 300 MG: 300 CAPSULE ORAL at 08:01

## 2024-01-18 RX ADMIN — PROMETHAZINE HYDROCHLORIDE 25 MG: 25 TABLET ORAL at 09:01

## 2024-01-18 RX ADMIN — HYDROMORPHONE HYDROCHLORIDE 0.5 MG: 0.5 INJECTION, SOLUTION INTRAMUSCULAR; INTRAVENOUS; SUBCUTANEOUS at 03:01

## 2024-01-18 RX ADMIN — HYDROMORPHONE HYDROCHLORIDE 0.5 MG: 0.5 INJECTION, SOLUTION INTRAMUSCULAR; INTRAVENOUS; SUBCUTANEOUS at 06:01

## 2024-01-18 RX ADMIN — DRONABINOL 5 MG: 2.5 CAPSULE ORAL at 04:01

## 2024-01-18 RX ADMIN — CLONAZEPAM 1 MG: 0.5 TABLET ORAL at 12:01

## 2024-01-18 RX ADMIN — DRONABINOL 5 MG: 2.5 CAPSULE ORAL at 05:01

## 2024-01-18 RX ADMIN — MIRTAZAPINE 30 MG: 15 TABLET, ORALLY DISINTEGRATING ORAL at 09:01

## 2024-01-18 RX ADMIN — MORPHINE SULFATE 4 MG: 4 INJECTION INTRAVENOUS at 01:01

## 2024-01-18 RX ADMIN — LOPERAMIDE HYDROCHLORIDE 4 MG: 1 SOLUTION ORAL at 04:01

## 2024-01-18 RX ADMIN — Medication 6 MG: at 09:01

## 2024-01-18 RX ADMIN — PANTOPRAZOLE SODIUM 40 MG: 40 TABLET, DELAYED RELEASE ORAL at 09:01

## 2024-01-18 RX ADMIN — LOPERAMIDE HYDROCHLORIDE 4 MG: 1 SOLUTION ORAL at 10:01

## 2024-01-18 RX ADMIN — MORPHINE SULFATE 4 MG: 4 INJECTION INTRAVENOUS at 06:01

## 2024-01-18 RX ADMIN — HYDROMORPHONE HYDROCHLORIDE 0.5 MG: 0.5 INJECTION, SOLUTION INTRAMUSCULAR; INTRAVENOUS; SUBCUTANEOUS at 10:01

## 2024-01-18 RX ADMIN — PROMETHAZINE HYDROCHLORIDE 25 MG: 25 TABLET ORAL at 02:01

## 2024-01-18 RX ADMIN — CLONAZEPAM 1 MG: 0.5 TABLET ORAL at 10:01

## 2024-01-18 RX ADMIN — SODIUM CHLORIDE, SODIUM LACTATE, POTASSIUM CHLORIDE, AND CALCIUM CHLORIDE: 600; 310; 30; 20 INJECTION, SOLUTION INTRAVENOUS at 09:01

## 2024-01-18 RX ADMIN — THERA TABS 1 TABLET: TAB at 08:01

## 2024-01-18 NOTE — PROGRESS NOTES
[facility] (Inflammatory Bowel Disease) Progress Note:    Reason for Consult: Crohn's disease w/high ileostomy output    Brief History:  Ivy Salgado is a 43 y.o. female for whom GI is consulted for IBD management. 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 GI/IBD consulted for further management of high ostomy output.     She was recently admitted from 12/31 to 1/07 with high ileostomy output and liver enzyme elevation; stool infectious workup negative. She underwent EGD with mild gastritis and ileoscopy with 6 apthous ulcers in the distal ileum. She had some dilation of the bile ducts in the central right hepatic lobe. MRCP showed punctate cystic focus in the pancreatic tail, likely a side IPMN. Prior to discharge, she did have an increase in her ostomy output which improved with scheduled imodium. Over the past 4 to 5 days, she has had an increase in her ileostomy output, which she notes as green, nonbloody. She has been unable to keep up with her fluid intake and has not had an appetite. Associated symptoms include persistent right sided abdominal pain. She was seen in IBD clinic on 1/16 with recommendations for hospital admission. Denies fevers, chills, vomiting, bloody stools. On arrival, she was afebrile and HDS. On labs on admission. She was admitted to Hospital Medicine further workup.     Interval History:  - >2 L of output in last 24 hours   - 1.325 L on 1/17 and as of 9 am 1L output on 1/18  - still feeling weak  and dehydrated  - LFTs WNL    IBD History      Current IBD Meds: Entyvio pending restart  Current GI Meds: imodium liquid    Review of Systems   Constitutional:  Positive for malaise/fatigue. Negative for chills, fever and weight loss.   HENT:          No oral ulcers, dysphagia, oral thrush   Eyes:  Negative for blurred vision, pain and redness.   Respiratory:  Negative for cough and shortness of breath.    Cardiovascular:  Negative for chest pain.   Gastrointestinal:  Positive for abdominal pain and diarrhea. Negative for heartburn, nausea and vomiting.   Genitourinary:  Negative for dysuria and hematuria.   Musculoskeletal:  Negative for back pain and joint pain.   Skin:  Negative for rash.   Psychiatric/Behavioral:  Negative for depression. The patient is not nervous/anxious and does not have insomnia.      Prior Pertinent Surgeries:   - Unclear, colon resection to address fistulous disease process  - Unclear, colon resection to address abscess   - S/p total proctocolectomy with end ileostomy, post-op course complicated by perineal wound which she followed with [facility]S for ~ 1 year (Dr. Parsons), treated with antibiotics and gel foam, healed   - S/p SHELLIE for ovarian cysts and endometriosis   - Port placement for outpatient immunotherapy infusions   2023- Left Shoulder Arthoplasty for avascular necrosis /2 chronic steroid exposure     Pertinent Endoscopy/Imagin2024- EGD- mild gastritis  2024-Ileoscopy- 6 ulcers in the distal ileum.  2017- Ileoscopy- The examined portion of the ileum was normal.   Biopsied. Widely patent end ileostomy with healthy appearing mucosa at the ileostomy. Pathology showed fragments of unremarkable small bowel mucosa. No evidence of active colitis, granuloma, dysplasia or malignancy  10/2016- EGD- Normal esophagus. Small hiatus hernia. Erythematous mucosa in the gastric body. Biopsied. Trace of hematin in the gastric body.   Normal examined duodenum.  Two  biopsies were obtained in the duodenal bulb. Four biopsies were obtained in the 2nd part of the duodenum.   10/2016- Ileoscopy- The examined portion of the ileum was normal. Biopsies without any acute abnormalities- CMV, HSV-1 AND HSV-2 stains performed. No celiac. No. H pylori. Normal mucosa of the small bowel.   10/2014- EGD- Normal Study. Biopsied to r/o Crohn's.    10/2014- Ileoscopy- The examined portion of the ileum was normal. Biopsies normal.  2/2014- SBE- A single (solitary) ulcer in the proximal ileum. Biopsied. Mild non-specific enteritis.   9/2013- Ileoscopy- Congested, eroded and ulcerated mucosa in the area at 5 cm proximal to the stoma. Biopsied. The examined portion of the ileum was normal otherwise up to 30 cm. Biopsied. FRAGMENTS OF NONNEOPLASTIC SMALL INTESTINAL MUCOSA WITH NO ACTIVE INFLAMMATION, ULCERATION OR DYSPLASIA PRESENT. THERE IS PRESERVATION OF THE VILLOUS ARCHITECTURE. FRAGMENTS OF NONNEOPLASTIC SMALL INTESTINAL MUCOSA WITH NO ACTIVE INFLAMMATION, ULCERATION OR DYSPLASIA IDENTIFIED.  9/2013- EGD- Normal esophagus. Z-line regular, 36 cm from the incisors. A single gastric polyp. Resected and retrieved. A small amount of food (residue) in the stomach. Removal was successful. Mucosal abnormality in the duodenum. Biopsied. Mild chronic gastritis but otherwise negative.   5/2012- EGD- Normal Study.   5/2012- Colonoscopy- Crohn's colitis mostly involving the distal 30 cm. This was biopsied to r/o CMV, HSV. Mild ileocolonic anastomosis Crohn's disease. Chronic active colitis with surface ulceration, cryptitis, crypt abscess and reactive changes. Negative for CMV.  Therapeutic Drug Monitoring Labs:    Prior IBD Therapies:  Entyvio/Vedolizumab- most recent therapy, was on this from [id number] and then stopped as she was getting recurrent UTI's/pyelonephritis   Stelara/Ustekinumab- was on this prior to Entyvio and had multiple infections   Cimzia/Certolizumab- stopped working after some years,  "unclear on dates, records note she was on this in 2016   Remicade/Inflixmab- stopped working after many years, unsure about antibodies   Humira/Adalimumab- Suspected drug-induced lupus due to joint pains  Prednisone tapers- effective, caused avascular necrosis to her left shoulder requiring arthroplasty   Imuran/Azathioprine- stopped due to drug-induced pancreatitis   Methotrexate- stopped due to significant leukopenia  Sulfasalazine at some point prior to 2012  Entocort- at some point prior to 2012   Canasa- at some point prior to 2012    Inpatient Medications:     droNABinol  5 mg Oral BID AC    enoxparin  40 mg Subcutaneous Q24H (prophylaxis, 1700)    gabapentin  300 mg Oral BID    loperamide  4 mg Oral QID    mirtazapine  30 mg Oral Nightly    multivitamin  1 tablet Oral Daily    pantoprazole  40 mg Oral BID       Vital Signs:  /66 (BP Location: Right arm, Patient Position: Lying)   Pulse 68   Temp 96.9 °F (36.1 °C)   Resp 16   Ht 5' 1" (1.549 m)   Wt 51.3 kg (113 lb 1.5 oz)   LMP 03/30/2015   SpO2 99%   Breastfeeding No   BMI 21.37 kg/m²      Physical Exam  Vitals and nursing note reviewed.   Constitutional:       General: She is not in acute distress.     Appearance: She is ill-appearing.   Cardiovascular:      Rate and Rhythm: Normal rate.      Pulses: Normal pulses.      Heart sounds: No murmur heard.  Pulmonary:      Effort: Pulmonary effort is normal. No respiratory distress.   Abdominal:      General: Bowel sounds are normal. There is no distension.      Palpations: Abdomen is soft.      Tenderness: There is no abdominal tenderness.   Skin:     General: Skin is warm and dry.      Capillary Refill: Capillary refill takes 2 to 3 seconds.   Neurological:      Mental Status: She is alert and oriented to person, place, and time.         Labs:   Lab Results   Component Value Date    WBC 5.84 01/18/2024    HGB 11.8 (L) 01/18/2024    HCT 35.9 (L) 01/18/2024    MCV 88 01/18/2024     " 01/18/2024     Lab Results   Component Value Date    CREATININE 0.7 01/18/2024    ALBUMIN 3.0 (L) 01/18/2024    BILITOT 0.2 01/18/2024    ALKPHOS 138 (H) 01/18/2024    AST 19 01/18/2024    ALT 13 01/18/2024     Lab Results   Component Value Date    CRP 1.8 01/16/2024    CALPROTECTIN 281.3 (H) 12/31/2023     Lab Results   Component Value Date    HEPBSAG Non-reactive 09/19/2023    HEPBCAB Non-reactive 01/02/2024     Lab Results   Component Value Date    TBGOLDPLUS Negative 01/02/2024     Lab Results   Component Value Date    SSZVDNJO49NU 21 (L) 06/13/2019    EOBCPDVF62 278 01/02/2024       Microbiology Results (last 7 days)       Procedure Component Value Units Date/Time    Clostridium difficile EIA [[id number]] Collected: 01/16/24 1332    Order Status: Completed Specimen: Stool Updated: 01/17/24 0115     C. diff Antigen Negative     C difficile Toxins A+B, EIA Negative     Comment: Testing not recommended for children <24 months old.       Stool culture [[id number]] Collected: 01/16/24 1332    Order Status: Sent Specimen: Stool Updated: 01/16/24 1450    E. coli 0157 antigen [[id number]] Collected: 01/16/24 1332    Order Status: No result Specimen: Stool Updated: 01/16/24 1450             Impression:  Ivy Salgado is a 43 y.o. female for whom GI is consulted for IBD management. 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 GI/IBD consulted for further management of high ostomy output.     Since her admit, her stool studies have been  unremarkable. Her output continues to be high with > 2L output documented in last 24 hours. She was started on 2 mg of imodium liquid 4x a day yesterday - previously on tablet imodium since admit. Today that was increased to max dose 4 mg 4x a day in order to try to decrease her output. We also added PRN low dose liquid lomotil to assist with continued increased output if needed. She continues on IVFs to assist with fluid replacement lost from ostomy output and her labs continue to be stable despite high output.       Crohn's Disease  High ileostomy output    Plan:  - Increase liquid imodium to 4 mg 4 x a day  - PRN lomotil 5ml 4 x day for continued increased output  - IVFs for high ostomy output  - Will hold off on endoscopy for now since she did just have an ileoscopy on 1/3  - Avoid NSAIDs since this may exacerbate IBD  - DVT prophylaxis (IBD pts increased risk of VTE) - Lovenox 40 mg QD  - Opiates- if necessary, IV morphine is preferred due to short acting nature      Shelby Zhong NP   Department of Gastroenterology  Inflammatory Bowel Disease

## 2024-01-19 LAB
ALBUMIN SERPL BCP-MCNC: 2.9 G/DL (ref 3.5–5.2)
ALP SERPL-CCNC: 126 U/L (ref 55–135)
ALT SERPL W/O P-5'-P-CCNC: 11 U/L (ref 10–44)
ANION GAP SERPL CALC-SCNC: 6 MMOL/L (ref 8–16)
AST SERPL-CCNC: 18 U/L (ref 10–40)
BACTERIA STL CULT: NORMAL
BASOPHILS # BLD AUTO: 0.04 K/UL (ref 0–0.2)
BASOPHILS NFR BLD: 0.6 % (ref 0–1.9)
BILIRUB SERPL-MCNC: 0.2 MG/DL (ref 0.1–1)
BUN SERPL-MCNC: 10 MG/DL (ref 6–20)
CALCIUM SERPL-MCNC: 9.1 MG/DL (ref 8.7–10.5)
CHLORIDE SERPL-SCNC: 105 MMOL/L (ref 95–110)
CO2 SERPL-SCNC: 26 MMOL/L (ref 23–29)
CREAT SERPL-MCNC: 0.7 MG/DL (ref 0.5–1.4)
DIFFERENTIAL METHOD BLD: ABNORMAL
ELASTASE 1, FECAL: >500 MCG/G
EOSINOPHIL # BLD AUTO: 0.2 K/UL (ref 0–0.5)
EOSINOPHIL NFR BLD: 2.7 % (ref 0–8)
ERYTHROCYTE [DISTWIDTH] IN BLOOD BY AUTOMATED COUNT: 12.8 % (ref 11.5–14.5)
EST. GFR  (NO RACE VARIABLE): >60 ML/MIN/1.73 M^2
FAT STL QL: NORMAL
GLUCOSE SERPL-MCNC: 97 MG/DL (ref 70–110)
HCT VFR BLD AUTO: 34.5 % (ref 37–48.5)
HGB BLD-MCNC: 10.9 G/DL (ref 12–16)
IMM GRANULOCYTES # BLD AUTO: 0.01 K/UL (ref 0–0.04)
IMM GRANULOCYTES NFR BLD AUTO: 0.1 % (ref 0–0.5)
LYMPHOCYTES # BLD AUTO: 2.2 K/UL (ref 1–4.8)
LYMPHOCYTES NFR BLD: 32.1 % (ref 18–48)
MAGNESIUM SERPL-MCNC: 1.7 MG/DL (ref 1.6–2.6)
MCH RBC QN AUTO: 28.5 PG (ref 27–31)
MCHC RBC AUTO-ENTMCNC: 31.6 G/DL (ref 32–36)
MCV RBC AUTO: 90 FL (ref 82–98)
MONOCYTES # BLD AUTO: 0.9 K/UL (ref 0.3–1)
MONOCYTES NFR BLD: 13.5 % (ref 4–15)
NEUTRAL FAT STL QL: NORMAL
NEUTROPHILS # BLD AUTO: 3.4 K/UL (ref 1.8–7.7)
NEUTROPHILS NFR BLD: 51 % (ref 38–73)
NRBC BLD-RTO: 0 /100 WBC
PHOSPHATE SERPL-MCNC: 5.3 MG/DL (ref 2.7–4.5)
PLATELET # BLD AUTO: 366 K/UL (ref 150–450)
PMV BLD AUTO: 9.4 FL (ref 9.2–12.9)
POTASSIUM SERPL-SCNC: 4 MMOL/L (ref 3.5–5.1)
PROT SERPL-MCNC: 6.7 G/DL (ref 6–8.4)
RBC # BLD AUTO: 3.82 M/UL (ref 4–5.4)
SODIUM SERPL-SCNC: 137 MMOL/L (ref 136–145)
WBC # BLD AUTO: 6.69 K/UL (ref 3.9–12.7)

## 2024-01-19 PROCEDURE — 63600175 PHARM REV CODE 636 W HCPCS

## 2024-01-19 PROCEDURE — 94761 N-INVAS EAR/PLS OXIMETRY MLT: CPT

## 2024-01-19 PROCEDURE — 85025 COMPLETE CBC W/AUTO DIFF WBC: CPT

## 2024-01-19 PROCEDURE — 25500020 PHARM REV CODE 255: Performed by: STUDENT IN AN ORGANIZED HEALTH CARE EDUCATION/TRAINING PROGRAM

## 2024-01-19 PROCEDURE — 25000003 PHARM REV CODE 250

## 2024-01-19 PROCEDURE — 99233 SBSQ HOSP IP/OBS HIGH 50: CPT | Mod: ,,,

## 2024-01-19 PROCEDURE — 11000001 HC ACUTE MED/SURG PRIVATE ROOM

## 2024-01-19 PROCEDURE — 83735 ASSAY OF MAGNESIUM: CPT

## 2024-01-19 PROCEDURE — 36415 COLL VENOUS BLD VENIPUNCTURE: CPT

## 2024-01-19 PROCEDURE — 63600175 PHARM REV CODE 636 W HCPCS: Performed by: STUDENT IN AN ORGANIZED HEALTH CARE EDUCATION/TRAINING PROGRAM

## 2024-01-19 PROCEDURE — 80053 COMPREHEN METABOLIC PANEL: CPT

## 2024-01-19 PROCEDURE — 84100 ASSAY OF PHOSPHORUS: CPT

## 2024-01-19 RX ORDER — MORPHINE SULFATE 2 MG/ML
2 INJECTION, SOLUTION INTRAMUSCULAR; INTRAVENOUS EVERY 4 HOURS PRN
Status: DISCONTINUED | OUTPATIENT
Start: 2024-01-19 | End: 2024-01-24

## 2024-01-19 RX ADMIN — DRONABINOL 5 MG: 2.5 CAPSULE ORAL at 04:01

## 2024-01-19 RX ADMIN — LOPERAMIDE HYDROCHLORIDE 4 MG: 1 SOLUTION ORAL at 08:01

## 2024-01-19 RX ADMIN — MORPHINE SULFATE 4 MG: 4 INJECTION INTRAVENOUS at 10:01

## 2024-01-19 RX ADMIN — PANTOPRAZOLE SODIUM 40 MG: 40 TABLET, DELAYED RELEASE ORAL at 08:01

## 2024-01-19 RX ADMIN — GABAPENTIN 300 MG: 300 CAPSULE ORAL at 08:01

## 2024-01-19 RX ADMIN — MORPHINE SULFATE 4 MG: 4 INJECTION INTRAVENOUS at 04:01

## 2024-01-19 RX ADMIN — MIRTAZAPINE 30 MG: 15 TABLET, ORALLY DISINTEGRATING ORAL at 08:01

## 2024-01-19 RX ADMIN — LOPERAMIDE HYDROCHLORIDE 4 MG: 1 SOLUTION ORAL at 05:01

## 2024-01-19 RX ADMIN — MORPHINE SULFATE 4 MG: 4 INJECTION INTRAVENOUS at 05:01

## 2024-01-19 RX ADMIN — MORPHINE SULFATE 2 MG: 2 INJECTION, SOLUTION INTRAMUSCULAR; INTRAVENOUS at 06:01

## 2024-01-19 RX ADMIN — MORPHINE SULFATE 2 MG: 2 INJECTION, SOLUTION INTRAMUSCULAR; INTRAVENOUS at 08:01

## 2024-01-19 RX ADMIN — MORPHINE SULFATE 2 MG: 2 INJECTION, SOLUTION INTRAMUSCULAR; INTRAVENOUS at 01:01

## 2024-01-19 RX ADMIN — IOHEXOL 75 ML: 350 INJECTION, SOLUTION INTRAVENOUS at 01:01

## 2024-01-19 RX ADMIN — CLONAZEPAM 1 MG: 0.5 TABLET ORAL at 09:01

## 2024-01-19 RX ADMIN — PROMETHAZINE HYDROCHLORIDE 25 MG: 25 TABLET ORAL at 05:01

## 2024-01-19 RX ADMIN — DRONABINOL 5 MG: 2.5 CAPSULE ORAL at 05:01

## 2024-01-19 RX ADMIN — LOPERAMIDE HYDROCHLORIDE 4 MG: 1 SOLUTION ORAL at 01:01

## 2024-01-19 RX ADMIN — THERA TABS 1 TABLET: TAB at 08:01

## 2024-01-19 RX ADMIN — SODIUM CHLORIDE, SODIUM LACTATE, POTASSIUM CHLORIDE, AND CALCIUM CHLORIDE: 600; 310; 30; 20 INJECTION, SOLUTION INTRAVENOUS at 03:01

## 2024-01-19 RX ADMIN — MORPHINE SULFATE 4 MG: 4 INJECTION INTRAVENOUS at 01:01

## 2024-01-19 RX ADMIN — CLONAZEPAM 1 MG: 0.5 TABLET ORAL at 11:01

## 2024-01-19 RX ADMIN — METHYLPREDNISOLONE SODIUM SUCCINATE 60 MG: 40 INJECTION, POWDER, FOR SOLUTION INTRAMUSCULAR; INTRAVENOUS at 08:01

## 2024-01-19 RX ADMIN — SODIUM CHLORIDE, SODIUM LACTATE, POTASSIUM CHLORIDE, AND CALCIUM CHLORIDE: 600; 310; 30; 20 INJECTION, SOLUTION INTRAVENOUS at 11:01

## 2024-01-19 NOTE — PROGRESS NOTES
[facility] Medicine  Progress Note    Patient Name: Ivy Salgado  MRN: [id number]  Patient Class: IP- Inpatient   Admission Date: 1/16/2024  Length of Stay: 3 days  Attending Physician: Jamison Horton MD  Primary Care Provider: Luis Madden DO        Subjective:     Principal Problem:Crohn's disease of both small and large intestine        HPI:  Patient is a 44 yo female with PMH of Crohn's disease s/p ileostomy, GERD, and ARPITA who presented from GI clinic at the request for admission from Dr. Peace Garcia for ongoing high ileostomy output. Of note, she was recently hospitalized 12/31-01/07 for similar presentation. She is reporting generalized 8/10 abdominal pain, nausea, high ileostomy output, decreased appetite,and heartburn. She states her symptoms are similar the symptoms she used to have with Crohn's flares in the past, but she reports she hasn't had a flare since 2020. She reports normally emptying her ileostomy bag 2-3 times after meals, but in the last few weeks, she has had to continuously empty the bag as high as 12 times even when not eating meals. Her appetite has been decreased, reporting only eating half of her normal intake. During her recent admission, she had a formal workup with EGD/ileoscopy and stool studies that were largely unrevealing aside from multiple ulcers in the prepyloric region. She denies fever, chills, chest pain, SOB, palpitations, headache, vision changes, skin changes, vomiting, urinary symptoms, blood in stool, weight loss, or weakness.     She arrived to [facility] as a direct admission with plans to be started on IVF, anti-diarrheals, pain medications, and plans for stool studies and repeat ileoscopy. She was admitted to hospital medicine for symptomatic care and management of high ileostomy output and potential Crohn's flare with consult to IBD team.    Overview/Hospital Course:  Patient was admitted as a direct admit from GI clinic due to high ileostomy  output. She was treated with continuous IVF, anti-diarrheals, and pain medications as needed. GI/IBD were consulted, recommended continued supportive treatment for time being. No plans for repeat ileoscopy as she recently completed one during last admission, biopsy from which showed active enteritis with ulceration. At this time CDiff studies negative, E. Coli Ag negative, and Stool culture with no growth. Patient was started on Solumedrol for presumed Crohn's flare. On 01/19, patient developed acute worsening abdominal pain, and a CTAP was ordered.     Interval History: NAEON. AFVSS. This morning, patient developed 10/10 abdominal pain with nausea, and she was seen curled up in the fetal position. Pain meds were administered, and the frequency was increased for the morphine 2 mg to q4h for severe breakthrough pain. Solumedrol was also started today for presumed Crohn's flare. Due to the acute worsening abdominal pain, a CTAP was ordered.     Review of Systems  Objective:     Vital Signs (Most Recent):  Temp: 98.1 °F (36.7 °C) (01/19/24 0848)  Pulse: 78 (01/19/24 0848)  Resp: 18 (01/19/24 1017)  BP: 115/73 (01/19/24 0848)  SpO2: 96 % (01/19/24 0848) Vital Signs (24h Range):  Temp:  [97.1 °F (36.2 °C)-99.1 °F (37.3 °C)] 98.1 °F (36.7 °C)  Pulse:  [65-78] 78  Resp:  [16-18] 18  SpO2:  [96 %-98 %] 96 %  BP: ()/(56-76) 115/73     Weight: 51.3 kg (113 lb 1.5 oz)  Body mass index is 21.37 kg/m².    Intake/Output Summary (Last 24 hours) at 1/19/2024 1128  Last data filed at 1/19/2024 0648  Gross per 24 hour   Intake --   Output 625 ml   Net -625 ml         Physical Exam  Vitals and nursing note reviewed.   Constitutional:       General: She is not in acute distress.     Appearance: Normal appearance. She is normal weight. She is not ill-appearing.   HENT:      Head: Normocephalic and atraumatic.      Mouth/Throat:      Mouth: Mucous membranes are moist.      Pharynx: Oropharynx is clear.   Eyes:      General: No  scleral icterus.     Extraocular Movements: Extraocular movements intact.      Conjunctiva/sclera: Conjunctivae normal.      Pupils: Pupils are equal, round, and reactive to light.   Cardiovascular:      Rate and Rhythm: Normal rate and regular rhythm.      Pulses: Normal pulses.      Heart sounds: Normal heart sounds.   Pulmonary:      Effort: Pulmonary effort is normal. No respiratory distress.      Breath sounds: Normal breath sounds. No wheezing.   Abdominal:      General: Bowel sounds are normal. There is no distension.      Palpations: Abdomen is soft.      Tenderness: There is abdominal tenderness. There is no guarding or rebound.      Comments: Ileostomy bag in place, no erythema or signs of leakage. Site is clean, dry and intact   Musculoskeletal:         General: No tenderness.      Right lower leg: No edema.      Left lower leg: No edema.   Skin:     General: Skin is warm and dry.      Coloration: Skin is not pale.      Findings: No erythema or rash.   Neurological:      General: No focal deficit present.      Mental Status: She is alert and oriented to person, place, and time. Mental status is at baseline.      Cranial Nerves: No cranial nerve deficit.   Psychiatric:         Mood and Affect: Mood normal.         Behavior: Behavior normal.             Significant Labs: All pertinent labs within the past 24 hours have been reviewed.    Significant Imaging: I have reviewed all pertinent imaging results/findings within the past 24 hours.    Assessment/Plan:      * Crohn's disease of both small and large intestine  43-year-old woman with PMH GERD, Crohn's s/p ileostomy presented to GI clinic with high ileostomy output 01/16 Recent admission to [facility] [place] (12/31-1/7) with Crohn's flare.  Admission requested by GI (Dr. Peace Garcia) for IVF, ileostomy through stoma to see if Crohn's is worse, stool studies and treatment to get her output down.  Patient reports symptoms of nausea, abdominal pain, and high ileostomy  output to be similar to past Crohn's flares.   Upon transfer, HDS and AF. CBC unremarkable. CMP unremarkable. ESR 65. Mg 1.2. Phos 5.3. . CRP 1.8    Plan:  - Consulted GI/IBD Team; not recommending repeat ileosocpy  - Stool Culture with no growth to date  - C.Diff negative; E Coli Ag negative  - F/u Blood cultures, patient reporting chills and diaphoresis on 1/18.   - Started Solumedrol 60 mg today, will become 20 mg TID starting tomorrow  - Continuous IVF - LR @ 100/hr  - Anti-diarrheals  - Daily CBC, CMP, Mg, and Phos    Nausea  - Phenergan 25mg q6h PRN first choice  - Compazine 2.5mg q6h PRN second choice    Abdominal pain  - Morphine 4mg IV q4h prn severe pain  - Morphine 2mg IV q4h breakthrough pain   - CTAP due to acute worsening pain (01/19); will follow up      VTE Risk Mitigation (From admission, onward)           Ordered     enoxaparin injection 40 mg  Every 24 hours         01/16/24 1429     Reason for No Pharmacological VTE Prophylaxis  Once        Question:  Reasons:  Answer:  Risk of Bleeding    01/16/24 1335     IP VTE HIGH RISK PATIENT  Once         01/16/24 1335     Place sequential compression device  Until discontinued         01/16/24 1335                    Discharge Planning   CHRISTOPH: 1/21/2024     Code Status: Full Code   Is the patient medically ready for discharge?: No    Reason for patient still in hospital (select all that apply): Patient unstable and Patient trending condition  Discharge Plan A: Home with family   Discharge Delays: None known at this time              Lb Meadows DO  Department of Hospital Medicine   [facility] S[facility]

## 2024-01-19 NOTE — ASSESSMENT & PLAN NOTE
43-year-old woman with PMH GERD, Crohn's s/p ileostomy presented to GI clinic with high ileostomy output 01/16 Recent admission to [facility] [place] (12/31-1/7) with Crohn's flare.  Admission requested by GI (Dr. Peace Garcia) for IVF, ileostomy through stoma to see if Crohn's is worse, stool studies and treatment to get her output down.  Patient reports symptoms of nausea, abdominal pain, and high ileostomy output to be similar to past Crohn's flares.   Upon transfer, HDS and AF. CBC unremarkable. CMP unremarkable. ESR 65. Mg 1.2. Phos 5.3. . CRP 1.8    Plan:  - Consulted GI/IBD Team; not recommending repeat ileosocpy  - Stool Culture with no growth to date  - C.Diff negative; E Coli Ag negative  - F/u Blood cultures, patient reporting chills and diaphoresis on 1/18.   - Started Solumedrol 60 mg today, will become 20 mg TID starting tomorrow  - Continuous IVF - LR @ 100/hr  - Anti-diarrheals  - Daily CBC, CMP, Mg, and Phos

## 2024-01-19 NOTE — ASSESSMENT & PLAN NOTE
- Morphine 4mg IV q4h prn severe pain  - Morphine 2mg IV q4h breakthrough pain   - CTAP due to acute worsening pain (01/19); will follow up

## 2024-01-19 NOTE — SUBJECTIVE & OBJECTIVE
Interval History: NAEON. AFVSS. Patient reports nausea/vomiting yesterday. Poor tolerability of PO. Also complaining of uncontrolled abdominal and shoulder pain as well as hot/cold flashes and diaphoresis. Blood cultures ordered. Morphine's frequency increased. Continuing LR infusion. CDiff + EColi Ag negative. Stool Cx with no growth. Pending no fevers and blood cultures negative, will discuss initiation of budesonide with GI.     Review of Systems   Constitutional:  Positive for appetite change. Negative for chills, fever and unexpected weight change.   HENT:  Negative for rhinorrhea and sore throat.    Eyes:  Negative for visual disturbance.   Respiratory:  Negative for cough, choking, chest tightness and shortness of breath.    Cardiovascular:  Negative for chest pain, palpitations and leg swelling.   Gastrointestinal:  Positive for abdominal pain and nausea. Negative for abdominal distention, blood in stool and vomiting.   Genitourinary:  Positive for flank pain. Negative for dysuria, frequency and urgency.   Musculoskeletal:  Positive for arthralgias (recent shoulder surgery) and back pain.   Neurological:  Negative for weakness, light-headedness and headaches.   Psychiatric/Behavioral:  Negative for confusion and decreased concentration.      Objective:     Vital Signs (Most Recent):  Temp: 97.1 °F (36.2 °C) (01/18/24 1614)  Pulse: 70 (01/18/24 1614)  Resp: 18 (01/18/24 1801)  BP: (!) 101/56 (01/18/24 1614)  SpO2: 97 % (01/18/24 1614) Vital Signs (24h Range):  Temp:  [96.9 °F (36.1 °C)-98.6 °F (37 °C)] 97.1 °F (36.2 °C)  Pulse:  [62-70] 70  Resp:  [16-18] 18  SpO2:  [97 %-99 %] 97 %  BP: ()/(56-72) 101/56     Weight: 51.3 kg (113 lb 1.5 oz)  Body mass index is 21.37 kg/m².    Intake/Output Summary (Last 24 hours) at 1/18/2024 1841  Last data filed at 1/18/2024 0731  Gross per 24 hour   Intake 480 ml   Output 2325 ml   Net -1845 ml           Physical Exam  Vitals and nursing note reviewed.    Constitutional:       General: She is not in acute distress.     Appearance: Normal appearance. She is normal weight. She is not ill-appearing.   HENT:      Head: Normocephalic and atraumatic.      Mouth/Throat:      Mouth: Mucous membranes are moist.      Pharynx: Oropharynx is clear.   Eyes:      General: No scleral icterus.     Extraocular Movements: Extraocular movements intact.      Conjunctiva/sclera: Conjunctivae normal.      Pupils: Pupils are equal, round, and reactive to light.   Cardiovascular:      Rate and Rhythm: Normal rate and regular rhythm.      Pulses: Normal pulses.      Heart sounds: Normal heart sounds.   Pulmonary:      Effort: Pulmonary effort is normal. No respiratory distress.      Breath sounds: Normal breath sounds. No wheezing.   Abdominal:      General: Bowel sounds are normal. There is no distension.      Palpations: Abdomen is soft.      Tenderness: There is abdominal tenderness. There is no guarding or rebound.      Comments: Ileostomy bag in place, no erythema or signs of leakage. Site is clean, dry and intact   Musculoskeletal:         General: No tenderness.      Right lower leg: No edema.      Left lower leg: No edema.   Skin:     General: Skin is warm and dry.      Coloration: Skin is not pale.      Findings: No erythema or rash.   Neurological:      General: No focal deficit present.      Mental Status: She is alert and oriented to person, place, and time. Mental status is at baseline.      Cranial Nerves: No cranial nerve deficit.   Psychiatric:         Mood and Affect: Mood normal.         Behavior: Behavior normal.             Significant Labs: All pertinent labs within the past 24 hours have been reviewed.    Significant Imaging: I have reviewed all pertinent imaging results/findings within the past 24 hours.

## 2024-01-19 NOTE — CARE UPDATE
Upon AM assessment - patient in clear pain. She is curled up in the fetal position and appears very uncomfortable. She received pain medication and first dose of IV steroids shortly before this NP saw her. She reports the pain became severe around 0430 this AM.     Discussed with Dr Kevin - STAT CT A/P ordered to assess for acute process due to significant change in pain from prior day.    IM team updated on plan and will monitor for patient's CT to result.    1043 - rounded on patient again and she appears more comfortable and reports the pain medication has helped the pain. Updated her on ordered CT to assess for any acute changes and reason for her pain.

## 2024-01-19 NOTE — PROGRESS NOTES
[facility] Medicine  Progress Note    Patient Name: Ivy Salgado  MRN: [id number]  Patient Class: IP- Inpatient   Admission Date: 1/16/2024  Length of Stay: 2 days  Attending Physician: Jamison Horton MD  Primary Care Provider: Luis Madden DO        Subjective:     Principal Problem:Crohn's disease of both small and large intestine        HPI:  Patient is a 44 yo female with PMH of Crohn's disease s/p ileostomy, GERD, and ARPITA who presented from GI clinic at the request for admission from Dr. Peace Garcia for ongoing high ileostomy output. Of note, she was recently hospitalized 12/31-01/07 for similar presentation. She is reporting generalized 8/10 abdominal pain, nausea, high ileostomy output, decreased appetite,and heartburn. She states her symptoms are similar the symptoms she used to have with Crohn's flares in the past, but she reports she hasn't had a flare since 2020. She reports normally emptying her ileostomy bag 2-3 times after meals, but in the last few weeks, she has had to continuously empty the bag as high as 12 times even when not eating meals. Her appetite has been decreased, reporting only eating half of her normal intake. During her recent admission, she had a formal workup with EGD/ileoscopy and stool studies that were largely unrevealing aside from multiple ulcers in the prepyloric region. She denies fever, chills, chest pain, SOB, palpitations, headache, vision changes, skin changes, vomiting, urinary symptoms, blood in stool, weight loss, or weakness.     She arrived to [facility] as a direct admission with plans to be started on IVF, anti-diarrheals, pain medications, and plans for stool studies and repeat ileoscopy. She was admitted to hospital medicine for symptomatic care and management of high ileostomy output and potential Crohn's flare with consult to IBD team.    Overview/Hospital Course:  Patient was admitted as a direct admit from GI clinic due to high ileostomy  output. She was treated with continuous IVF, anti-diarrheals, and pain medications as needed. GI/IBD were consulted, recommended continued supportive treatment for time being. No plans for repeat ileoscopy as she recently completed one during last admission, biopsy from which showed active enteritis with ulceration. At this time CDiff studies negative, E. Coli Ag negative, and Stool culture with no growth. Tentative plan to initiate Budesonide once infection ruled out.     Interval History: NAEON. AFVSS. Patient reports nausea/vomiting yesterday. Poor tolerability of PO. Also complaining of uncontrolled abdominal and shoulder pain as well as hot/cold flashes and diaphoresis. Blood cultures ordered. Morphine's frequency increased. Continuing LR infusion. CDiff + EColi Ag negative. Stool Cx with no growth. Pending no fevers and blood cultures negative, will discuss initiation of budesonide with GI.     Review of Systems   Constitutional:  Positive for appetite change. Negative for chills, fever and unexpected weight change.   HENT:  Negative for rhinorrhea and sore throat.    Eyes:  Negative for visual disturbance.   Respiratory:  Negative for cough, choking, chest tightness and shortness of breath.    Cardiovascular:  Negative for chest pain, palpitations and leg swelling.   Gastrointestinal:  Positive for abdominal pain and nausea. Negative for abdominal distention, blood in stool and vomiting.   Genitourinary:  Positive for flank pain. Negative for dysuria, frequency and urgency.   Musculoskeletal:  Positive for arthralgias (recent shoulder surgery) and back pain.   Neurological:  Negative for weakness, light-headedness and headaches.   Psychiatric/Behavioral:  Negative for confusion and decreased concentration.      Objective:     Vital Signs (Most Recent):  Temp: 97.1 °F (36.2 °C) (01/18/24 1614)  Pulse: 70 (01/18/24 1614)  Resp: 18 (01/18/24 1801)  BP: (!) 101/56 (01/18/24 1614)  SpO2: 97 % (01/18/24 1614) Vital  Signs (24h Range):  Temp:  [96.9 °F (36.1 °C)-98.6 °F (37 °C)] 97.1 °F (36.2 °C)  Pulse:  [62-70] 70  Resp:  [16-18] 18  SpO2:  [97 %-99 %] 97 %  BP: ()/(56-72) 101/56     Weight: 51.3 kg (113 lb 1.5 oz)  Body mass index is 21.37 kg/m².    Intake/Output Summary (Last 24 hours) at 1/18/2024 1841  Last data filed at 1/18/2024 0731  Gross per 24 hour   Intake 480 ml   Output 2325 ml   Net -1845 ml           Physical Exam  Vitals and nursing note reviewed.   Constitutional:       General: She is not in acute distress.     Appearance: Normal appearance. She is normal weight. She is not ill-appearing.   HENT:      Head: Normocephalic and atraumatic.      Mouth/Throat:      Mouth: Mucous membranes are moist.      Pharynx: Oropharynx is clear.   Eyes:      General: No scleral icterus.     Extraocular Movements: Extraocular movements intact.      Conjunctiva/sclera: Conjunctivae normal.      Pupils: Pupils are equal, round, and reactive to light.   Cardiovascular:      Rate and Rhythm: Normal rate and regular rhythm.      Pulses: Normal pulses.      Heart sounds: Normal heart sounds.   Pulmonary:      Effort: Pulmonary effort is normal. No respiratory distress.      Breath sounds: Normal breath sounds. No wheezing.   Abdominal:      General: Bowel sounds are normal. There is no distension.      Palpations: Abdomen is soft.      Tenderness: There is abdominal tenderness. There is no guarding or rebound.      Comments: Ileostomy bag in place, no erythema or signs of leakage. Site is clean, dry and intact   Musculoskeletal:         General: No tenderness.      Right lower leg: No edema.      Left lower leg: No edema.   Skin:     General: Skin is warm and dry.      Coloration: Skin is not pale.      Findings: No erythema or rash.   Neurological:      General: No focal deficit present.      Mental Status: She is alert and oriented to person, place, and time. Mental status is at baseline.      Cranial Nerves: No cranial  nerve deficit.   Psychiatric:         Mood and Affect: Mood normal.         Behavior: Behavior normal.             Significant Labs: All pertinent labs within the past 24 hours have been reviewed.    Significant Imaging: I have reviewed all pertinent imaging results/findings within the past 24 hours.    Assessment/Plan:      * Crohn's disease of both small and large intestine  43-year-old woman with PMH GERD, Crohn's s/p ileostomy presented to GI clinic with high ileostomy output 01/16 Recent admission to [facility] (12/31-1/7) with Crohn's flare.  Admission requested by GI (Dr. Peace Garcia) for IVF, ileostomy through stoma to see if Crohn's is worse, stool studies and treatment to get her output down.  Patient reports symptoms of nausea, abdominal pain, and high ileostomy output to be similar to past Crohn's flares.   Upon transfer, HDS and AF. CBC unremarkable. CMP unremarkable. ESR 65. Mg 1.2. Phos 5.3. . CRP 1.8    Plan:  - Consulted GI/IBD Team; not recommending repeat ileosocpy  - Stool Culture with no growth to date  - C.Diff negative; E Coli Ag negative  - F/u Blood cultures, patient reporting chills and diaphoresis on 1/18.   - Tentative plan to start Budesonide once infection ruled out.   - Continuous IVF - LR @ 100/hr  - Anti-diarrheals  - Daily CBC, CMP, Mg, and Phos    Nausea  - Phenergan 25mg q6h PRN first choice  - Compazine 2.5mg q6h PRN second choice    Abdominal pain  - Morphine 4mg IV q6h prn severe pain  - Morphine 2mg IV q6h breakthrough pain       VTE Risk Mitigation (From admission, onward)           Ordered     enoxaparin injection 40 mg  Every 24 hours         01/16/24 1428     Reason for No Pharmacological VTE Prophylaxis  Once        Question:  Reasons:  Answer:  Risk of Bleeding    01/16/24 1335     IP VTE HIGH RISK PATIENT  Once         01/16/24 1335     Place sequential compression device  Until discontinued         01/16/24 1335                    Discharge Planning   CHRISTOPH: 1/19/2024      Code Status: Full Code   Is the patient medically ready for discharge?: No    Reason for patient still in hospital (select all that apply): Patient trending condition, Treatment, and Consult recommendations  Discharge Plan A: Home with family      Quiana Shelley MD  Department of Hospital Medicine   [facility]

## 2024-01-19 NOTE — ASSESSMENT & PLAN NOTE
43-year-old woman with PMH GERD, Crohn's s/p ileostomy presented to GI clinic with high ileostomy output 01/16 Recent admission to [facility] (12/31-1/7) with Crohn's flare.  Admission requested by GI (Dr. Peace Garcia) for IVF, ileostomy through stoma to see if Crohn's is worse, stool studies and treatment to get her output down.  Patient reports symptoms of nausea, abdominal pain, and high ileostomy output to be similar to past Crohn's flares.   Upon transfer, HDS and AF. CBC unremarkable. CMP unremarkable. ESR 65. Mg 1.2. Phos 5.3. . CRP 1.8    Plan:  - Consulted GI/IBD Team; not recommending repeat ileosocpy  - Stool Culture with no growth to date  - C.Diff negative; E Coli Ag negative  - F/u Blood cultures, patient reporting chills and diaphoresis on 1/18.   - Tentative plan to start Budesonide once infection ruled out.   - Continuous IVF - LR @ 100/hr  - Anti-diarrheals  - Daily CBC, CMP, Mg, and Phos

## 2024-01-19 NOTE — PLAN OF CARE
APPOINTMENT:    Patient Appointment(s) scheduled with Luis Madden DO Friday Feb 2, 2024 1:45 PM

## 2024-01-19 NOTE — PROGRESS NOTES
[facility] (Inflammatory Bowel Disease) Progress Note:    Reason for Consult: Crohn's disease w/high ileostomy output    Brief History:  Ivy Salgado is a 43 y.o. female for whom GI is consulted for IBD management. 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 GI/IBD consulted for further management of high ostomy output.     She was recently admitted from 12/31 to 1/07 with high ileostomy output and liver enzyme elevation; stool infectious workup negative. She underwent EGD with mild gastritis and ileoscopy with 6 apthous ulcers in the distal ileum. She had some dilation of the bile ducts in the central right hepatic lobe. MRCP showed punctate cystic focus in the pancreatic tail, likely a side IPMN. Prior to discharge, she did have an increase in her ostomy output which improved with scheduled imodium. Over the past 4 to 5 days, she has had an increase in her ileostomy output, which she notes as green, nonbloody. She has been unable to keep up with her fluid intake and has not had an appetite. Associated symptoms include persistent right sided abdominal pain. She was seen in IBD clinic on 1/16 with recommendations for hospital admission. Denies fevers, chills, vomiting, bloody stools. On arrival, she was afebrile and HDS. On labs on admission. She was admitted to Hospital Medicine further workup.     Interval History:  - >1.5 L of output in last 24 hours  - Acute worsening of abdominal pain early this AM  - Nausea increased -  suspect from pain  - LFTs WNL    IBD History      Current IBD Meds: Entyvio pending restart  Current GI Meds: imodium liquid    Review of Systems   Constitutional:  Positive for malaise/fatigue. Negative for chills, fever and weight loss.   HENT:          No oral ulcers, dysphagia, oral thrush   Eyes:  Negative for blurred vision, pain and redness.   Respiratory:  Negative for cough and shortness of breath.    Cardiovascular:  Negative for chest pain.   Gastrointestinal:  Positive for abdominal pain and diarrhea. Negative for heartburn, nausea and vomiting.   Genitourinary:  Negative for dysuria and hematuria.   Musculoskeletal:  Negative for back pain and joint pain.   Skin:  Negative for rash.   Psychiatric/Behavioral:  Negative for depression. The patient is not nervous/anxious and does not have insomnia.      Prior Pertinent Surgeries:   - Unclear, colon resection to address fistulous disease process  - Unclear, colon resection to address abscess   - S/p total proctocolectomy with end ileostomy, post-op course complicated by perineal wound which she followed with CRS for ~ 1 year (Dr. Parsons), treated with antibiotics and gel foam, healed   - S/p SHELLIE for ovarian cysts and endometriosis   - Port placement for outpatient immunotherapy infusions   2023- Left Shoulder Arthoplasty for avascular necrosis /2 chronic steroid exposure     Pertinent Endoscopy/Imagin2024- EGD- mild gastritis  2024-Ileoscopy- 6 ulcers in the distal ileum.  2017- Ileoscopy- The examined portion of the ileum was normal.   Biopsied. Widely patent end ileostomy with healthy appearing mucosa at the ileostomy. Pathology showed fragments of unremarkable small bowel mucosa. No evidence of active colitis, granuloma, dysplasia or malignancy  10/2016- EGD- Normal esophagus. Small hiatus hernia. Erythematous mucosa in the gastric body. Biopsied. Trace of hematin in the gastric body.   Normal examined duodenum.  Two  biopsies were obtained in the duodenal bulb. Four biopsies were obtained in the 2nd part of the duodenum.   10/2016- Ileoscopy- The examined portion of the ileum was normal. Biopsies without any acute abnormalities- CMV, HSV-1 AND HSV-2 stains performed. No celiac. No. H pylori. Normal mucosa of the small bowel.   10/2014- EGD- Normal Study. Biopsied to r/o Crohn's.    10/2014- Ileoscopy- The examined portion of the ileum was normal. Biopsies normal.  2/2014- SBE- A single (solitary) ulcer in the proximal ileum. Biopsied. Mild non-specific enteritis.   9/2013- Ileoscopy- Congested, eroded and ulcerated mucosa in the area at 5 cm proximal to the stoma. Biopsied. The examined portion of the ileum was normal otherwise up to 30 cm. Biopsied. FRAGMENTS OF NONNEOPLASTIC SMALL INTESTINAL MUCOSA WITH NO ACTIVE INFLAMMATION, ULCERATION OR DYSPLASIA PRESENT. THERE IS PRESERVATION OF THE VILLOUS ARCHITECTURE. FRAGMENTS OF NONNEOPLASTIC SMALL INTESTINAL MUCOSA WITH NO ACTIVE INFLAMMATION, ULCERATION OR DYSPLASIA IDENTIFIED.  9/2013- EGD- Normal esophagus. Z-line regular, 36 cm from the incisors. A single gastric polyp. Resected and retrieved. A small amount of food (residue) in the stomach. Removal was successful. Mucosal abnormality in the duodenum. Biopsied. Mild chronic gastritis but otherwise negative.   5/2012- EGD- Normal Study.   5/2012- Colonoscopy- Crohn's colitis mostly involving the distal 30 cm. This was biopsied to r/o CMV, HSV. Mild ileocolonic anastomosis Crohn's disease. Chronic active colitis with surface ulceration, cryptitis, crypt abscess and reactive changes. Negative for CMV.  Therapeutic Drug Monitoring Labs:    Prior IBD Therapies:  Entyvio/Vedolizumab- most recent therapy, was on this from 6860-8208 and then stopped as she was getting recurrent UTI's/pyelonephritis   Stelara/Ustekinumab- was on this prior to Entyvio and had multiple infections   Cimzia/Certolizumab- stopped working after some years,  "unclear on dates, records note she was on this in 2016   Remicade/Inflixmab- stopped working after many years, unsure about antibodies   Humira/Adalimumab- Suspected drug-induced lupus due to joint pains  Prednisone tapers- effective, caused avascular necrosis to her left shoulder requiring arthroplasty   Imuran/Azathioprine- stopped due to drug-induced pancreatitis   Methotrexate- stopped due to significant leukopenia  Sulfasalazine at some point prior to 2012  Entocort- at some point prior to 2012   Canasa- at some point prior to 2012    Inpatient Medications:     droNABinol  5 mg Oral BID AC    enoxparin  40 mg Subcutaneous Q24H (prophylaxis, 1700)    gabapentin  300 mg Oral BID    loperamide  4 mg Oral QID    [START ON 1/20/2024] methylPREDNISolone sodium succinate injection  20 mg Intravenous TID    mirtazapine  30 mg Oral Nightly    multivitamin  1 tablet Oral Daily    pantoprazole  40 mg Oral BID       Vital Signs:  /73 (BP Location: Right arm, Patient Position: Lying)   Pulse 78   Temp 98.1 °F (36.7 °C) (Oral)   Resp 18   Ht 5' 1" (1.549 m)   Wt 51.3 kg (113 lb 1.5 oz)   LMP 03/30/2015   SpO2 96%   Breastfeeding No   BMI 21.37 kg/m²      Physical Exam  Vitals and nursing note reviewed.   Constitutional:       General: She is in acute distress (upon first AM assessment).      Appearance: She is ill-appearing.   Cardiovascular:      Rate and Rhythm: Normal rate.      Pulses: Normal pulses.      Heart sounds: No murmur heard.  Pulmonary:      Effort: Pulmonary effort is normal. No respiratory distress.   Abdominal:      General: Bowel sounds are normal. There is no distension.      Palpations: Abdomen is soft.      Tenderness: There is abdominal tenderness.   Skin:     General: Skin is warm and dry.      Capillary Refill: Capillary refill takes 2 to 3 seconds.   Neurological:      Mental Status: She is alert and oriented to person, place, and time.         Labs:   Lab Results   Component Value " Date    WBC 6.69 01/19/2024    HGB 10.9 (L) 01/19/2024    HCT 34.5 (L) 01/19/2024    MCV 90 01/19/2024     01/19/2024     Lab Results   Component Value Date    CREATININE 0.7 01/19/2024    ALBUMIN 2.9 (L) 01/19/2024    BILITOT 0.2 01/19/2024    ALKPHOS 126 01/19/2024    AST 18 01/19/2024    ALT 11 01/19/2024     Lab Results   Component Value Date    CRP 1.8 01/16/2024    CALPROTECTIN 281.3 (H) 12/31/2023     Lab Results   Component Value Date    HEPBSAG Non-reactive 09/19/2023    HEPBCAB Non-reactive 01/02/2024     Lab Results   Component Value Date    TBGOLDPLUS Negative 01/02/2024     Lab Results   Component Value Date    TLWLYWYA03QF 21 (L) 06/13/2019    JOKUKOIU95 278 01/02/2024       Microbiology Results (last 7 days)       Procedure Component Value Units Date/Time    Blood culture [[id number]] Collected: 01/18/24 1659    Order Status: Completed Specimen: Blood Updated: 01/19/24 0145     Blood Culture, Routine No Growth to date    Blood culture [[id number]] Collected: 01/18/24 1659    Order Status: Completed Specimen: Blood Updated: 01/19/24 0145     Blood Culture, Routine No Growth to date    E. coli 0157 antigen [[id number]] Collected: 01/16/24 1332    Order Status: Completed Specimen: Stool Updated: 01/18/24 1152     Shiga Toxin 1 E.coli Negative     Shiga Toxin 2 E.coli Negative    Stool culture [[id number]] Collected: 01/16/24 1332    Order Status: Completed Specimen: Stool Updated: 01/18/24 1103     Stool Culture Nothing significant to date    Clostridium difficile EIA [[id number]] Collected: 01/16/24 1332    Order Status: Completed Specimen: Stool Updated: 01/17/24 0115     C. diff Antigen Negative     C difficile Toxins A+B, EIA Negative     Comment: Testing not recommended for children <24 months old.                Impression:  Ivy Salgado is a 43 y.o. female for whom GI is consulted for IBD management. 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 GI/IBD consulted for further management of high ostomy output.     Since her admit, her stool studies have been unremarkable. Her output continues to be high >1.5L. We increased her imodium to max dose 4 mg 4x a day in order to try to decrease her output. We also added PRN low dose liquid lomotil to assist with continued increased output if needed. She has not taken the PRN lomotil despite having continued high output. She continues on IVFs to assist with fluid replacement lost from ostomy output and her labs continue to be stable despite high output. This morning solumedrol 60 mg was started to try to temper her symptoms which was given. Upon assessment she was in clear pain and discomfort which she stated started abruptly around 4:30 this AM. Discussed this abrupt change with Dr Kevin who agreed imaging should be obtained. CT A/P w/contrast ordered STAT for assessment. Further plans to be made dependent on results.     Follow-up rounding completed on patient later in morning and she appeared much more comfortable and was sitting up in bed able to hold a conversation. Updated her on plan to get CT and monitor her output closely. Will hold off on changing anti-diarrheals and see if steroid helps her symptoms at all.       Crohn's Disease  High ileostomy output  Acute abdominal pain     Plan:  - CT A/P STAT ordered for acute abdominal pain worsening  - Solumedrol 60 mg x 1 today - given   - then 20 mg TID starting tomorrow (1/20) unless acute abdominal process seen on CT scan today    - Continue liquid imodium to 4 mg 4 x a day  - PRN lomotil 5ml 4 x day for continued increased output  - IVFs for high ostomy output  - Will hold off on endoscopy for now since she did just have an ileoscopy on 1/3  - Avoid NSAIDs since this may exacerbate IBD  - DVT prophylaxis (IBD pts increased risk of VTE) - Lovenox 40 mg QD  - Opiates- if necessary, IV morphine is preferred due to short acting nature      Shelby Zhong NP   Department of Gastroenterology  Inflammatory Bowel Disease

## 2024-01-19 NOTE — PLAN OF CARE
01/19/24 1047   Post-Acute Status   Post-Acute Authorization Other   Other Status No Post-Acute Service Needs   Hospital Resources/Appts/Education Provided Appointments scheduled and added to AVS   Discharge Delays None known at this time   Discharge Plan   Discharge Plan A Home with family   Discharge Plan B Home

## 2024-01-19 NOTE — SUBJECTIVE & OBJECTIVE
Interval History: NAEON. AFVSS. This morning, patient developed 10/10 abdominal pain with nausea, and she was seen curled up in the fetal position. Pain meds were administered, and the frequency was increased for the morphine 2 mg to q4h for severe breakthrough pain. Solumedrol was also started today for presumed Crohn's flare. Due to the acute worsening abdominal pain, a CTAP was ordered.     Review of Systems  Objective:     Vital Signs (Most Recent):  Temp: 98.1 °F (36.7 °C) (01/19/24 0848)  Pulse: 78 (01/19/24 0848)  Resp: 18 (01/19/24 1017)  BP: 115/73 (01/19/24 0848)  SpO2: 96 % (01/19/24 0848) Vital Signs (24h Range):  Temp:  [97.1 °F (36.2 °C)-99.1 °F (37.3 °C)] 98.1 °F (36.7 °C)  Pulse:  [65-78] 78  Resp:  [16-18] 18  SpO2:  [96 %-98 %] 96 %  BP: ()/(56-76) 115/73     Weight: 51.3 kg (113 lb 1.5 oz)  Body mass index is 21.37 kg/m².    Intake/Output Summary (Last 24 hours) at 1/19/2024 1128  Last data filed at 1/19/2024 0648  Gross per 24 hour   Intake --   Output 625 ml   Net -625 ml         Physical Exam  Vitals and nursing note reviewed.   Constitutional:       General: She is not in acute distress.     Appearance: Normal appearance. She is normal weight. She is not ill-appearing.   HENT:      Head: Normocephalic and atraumatic.      Mouth/Throat:      Mouth: Mucous membranes are moist.      Pharynx: Oropharynx is clear.   Eyes:      General: No scleral icterus.     Extraocular Movements: Extraocular movements intact.      Conjunctiva/sclera: Conjunctivae normal.      Pupils: Pupils are equal, round, and reactive to light.   Cardiovascular:      Rate and Rhythm: Normal rate and regular rhythm.      Pulses: Normal pulses.      Heart sounds: Normal heart sounds.   Pulmonary:      Effort: Pulmonary effort is normal. No respiratory distress.      Breath sounds: Normal breath sounds. No wheezing.   Abdominal:      General: Bowel sounds are normal. There is no distension.      Palpations: Abdomen is soft.       Tenderness: There is abdominal tenderness. There is no guarding or rebound.      Comments: Ileostomy bag in place, no erythema or signs of leakage. Site is clean, dry and intact   Musculoskeletal:         General: No tenderness.      Right lower leg: No edema.      Left lower leg: No edema.   Skin:     General: Skin is warm and dry.      Coloration: Skin is not pale.      Findings: No erythema or rash.   Neurological:      General: No focal deficit present.      Mental Status: She is alert and oriented to person, place, and time. Mental status is at baseline.      Cranial Nerves: No cranial nerve deficit.   Psychiatric:         Mood and Affect: Mood normal.         Behavior: Behavior normal.             Significant Labs: All pertinent labs within the past 24 hours have been reviewed.    Significant Imaging: I have reviewed all pertinent imaging results/findings within the past 24 hours.

## 2024-01-20 PROBLEM — K50.80 CROHN'S DISEASE OF BOTH SMALL AND LARGE INTESTINE: Chronic | Status: ACTIVE | Noted: 2017-02-21

## 2024-01-20 PROBLEM — K21.9 GASTROESOPHAGEAL REFLUX DISEASE: Chronic | Status: ACTIVE | Noted: 2019-05-28

## 2024-01-20 LAB
ALBUMIN SERPL BCP-MCNC: 3 G/DL (ref 3.5–5.2)
ALP SERPL-CCNC: 138 U/L (ref 55–135)
ALT SERPL W/O P-5'-P-CCNC: 14 U/L (ref 10–44)
ANION GAP SERPL CALC-SCNC: 8 MMOL/L (ref 8–16)
AST SERPL-CCNC: 22 U/L (ref 10–40)
BASOPHILS # BLD AUTO: 0.02 K/UL (ref 0–0.2)
BASOPHILS NFR BLD: 0.2 % (ref 0–1.9)
BILIRUB SERPL-MCNC: 0.2 MG/DL (ref 0.1–1)
BUN SERPL-MCNC: 14 MG/DL (ref 6–20)
CALCIUM SERPL-MCNC: 9.2 MG/DL (ref 8.7–10.5)
CHLORIDE SERPL-SCNC: 105 MMOL/L (ref 95–110)
CO2 SERPL-SCNC: 24 MMOL/L (ref 23–29)
CREAT SERPL-MCNC: 0.7 MG/DL (ref 0.5–1.4)
DIFFERENTIAL METHOD BLD: ABNORMAL
EOSINOPHIL # BLD AUTO: 0 K/UL (ref 0–0.5)
EOSINOPHIL NFR BLD: 0.1 % (ref 0–8)
ERYTHROCYTE [DISTWIDTH] IN BLOOD BY AUTOMATED COUNT: 13.1 % (ref 11.5–14.5)
EST. GFR  (NO RACE VARIABLE): >60 ML/MIN/1.73 M^2
GLUCOSE SERPL-MCNC: 107 MG/DL (ref 70–110)
HCT VFR BLD AUTO: 31.3 % (ref 37–48.5)
HGB BLD-MCNC: 9.8 G/DL (ref 12–16)
IMM GRANULOCYTES # BLD AUTO: 0.02 K/UL (ref 0–0.04)
IMM GRANULOCYTES NFR BLD AUTO: 0.2 % (ref 0–0.5)
LYMPHOCYTES # BLD AUTO: 2.2 K/UL (ref 1–4.8)
LYMPHOCYTES NFR BLD: 26.2 % (ref 18–48)
MAGNESIUM SERPL-MCNC: 1.9 MG/DL (ref 1.6–2.6)
MCH RBC QN AUTO: 28.6 PG (ref 27–31)
MCHC RBC AUTO-ENTMCNC: 31.3 G/DL (ref 32–36)
MCV RBC AUTO: 91 FL (ref 82–98)
MONOCYTES # BLD AUTO: 1.1 K/UL (ref 0.3–1)
MONOCYTES NFR BLD: 13 % (ref 4–15)
NEUTROPHILS # BLD AUTO: 5.1 K/UL (ref 1.8–7.7)
NEUTROPHILS NFR BLD: 60.3 % (ref 38–73)
NRBC BLD-RTO: 0 /100 WBC
PHOSPHATE SERPL-MCNC: 3.2 MG/DL (ref 2.7–4.5)
PLATELET # BLD AUTO: 359 K/UL (ref 150–450)
PMV BLD AUTO: 9.2 FL (ref 9.2–12.9)
POTASSIUM SERPL-SCNC: 4.1 MMOL/L (ref 3.5–5.1)
PROT SERPL-MCNC: 7 G/DL (ref 6–8.4)
RBC # BLD AUTO: 3.43 M/UL (ref 4–5.4)
SODIUM SERPL-SCNC: 137 MMOL/L (ref 136–145)
WBC # BLD AUTO: 8.46 K/UL (ref 3.9–12.7)

## 2024-01-20 PROCEDURE — 25000003 PHARM REV CODE 250

## 2024-01-20 PROCEDURE — 84100 ASSAY OF PHOSPHORUS: CPT

## 2024-01-20 PROCEDURE — 36415 COLL VENOUS BLD VENIPUNCTURE: CPT

## 2024-01-20 PROCEDURE — 63600175 PHARM REV CODE 636 W HCPCS: Performed by: STUDENT IN AN ORGANIZED HEALTH CARE EDUCATION/TRAINING PROGRAM

## 2024-01-20 PROCEDURE — 11000001 HC ACUTE MED/SURG PRIVATE ROOM

## 2024-01-20 PROCEDURE — 80053 COMPREHEN METABOLIC PANEL: CPT

## 2024-01-20 PROCEDURE — 63600175 PHARM REV CODE 636 W HCPCS

## 2024-01-20 PROCEDURE — 83735 ASSAY OF MAGNESIUM: CPT

## 2024-01-20 PROCEDURE — 85025 COMPLETE CBC W/AUTO DIFF WBC: CPT

## 2024-01-20 RX ADMIN — MORPHINE SULFATE 2 MG: 2 INJECTION, SOLUTION INTRAMUSCULAR; INTRAVENOUS at 12:01

## 2024-01-20 RX ADMIN — CLONAZEPAM 1 MG: 0.5 TABLET ORAL at 09:01

## 2024-01-20 RX ADMIN — PROMETHAZINE HYDROCHLORIDE 25 MG: 25 TABLET ORAL at 08:01

## 2024-01-20 RX ADMIN — METHYLPREDNISOLONE SODIUM SUCCINATE 20 MG: 40 INJECTION, POWDER, FOR SOLUTION INTRAMUSCULAR; INTRAVENOUS at 08:01

## 2024-01-20 RX ADMIN — METHYLPREDNISOLONE SODIUM SUCCINATE 20 MG: 40 INJECTION, POWDER, FOR SOLUTION INTRAMUSCULAR; INTRAVENOUS at 03:01

## 2024-01-20 RX ADMIN — LOPERAMIDE HYDROCHLORIDE 4 MG: 1 SOLUTION ORAL at 06:01

## 2024-01-20 RX ADMIN — PROMETHAZINE HYDROCHLORIDE 25 MG: 25 TABLET ORAL at 02:01

## 2024-01-20 RX ADMIN — LOPERAMIDE HYDROCHLORIDE 4 MG: 1 SOLUTION ORAL at 09:01

## 2024-01-20 RX ADMIN — MORPHINE SULFATE 2 MG: 2 INJECTION, SOLUTION INTRAMUSCULAR; INTRAVENOUS at 02:01

## 2024-01-20 RX ADMIN — PANTOPRAZOLE SODIUM 40 MG: 40 TABLET, DELAYED RELEASE ORAL at 08:01

## 2024-01-20 RX ADMIN — MORPHINE SULFATE 4 MG: 4 INJECTION INTRAVENOUS at 03:01

## 2024-01-20 RX ADMIN — LOPERAMIDE HYDROCHLORIDE 4 MG: 1 SOLUTION ORAL at 08:01

## 2024-01-20 RX ADMIN — DRONABINOL 5 MG: 2.5 CAPSULE ORAL at 06:01

## 2024-01-20 RX ADMIN — MORPHINE SULFATE 4 MG: 4 INJECTION INTRAVENOUS at 09:01

## 2024-01-20 RX ADMIN — CLONAZEPAM 1 MG: 0.5 TABLET ORAL at 08:01

## 2024-01-20 RX ADMIN — MIRTAZAPINE 30 MG: 15 TABLET, ORALLY DISINTEGRATING ORAL at 09:01

## 2024-01-20 RX ADMIN — PROMETHAZINE HYDROCHLORIDE 25 MG: 25 TABLET ORAL at 03:01

## 2024-01-20 RX ADMIN — METHYLPREDNISOLONE SODIUM SUCCINATE 20 MG: 40 INJECTION, POWDER, FOR SOLUTION INTRAMUSCULAR; INTRAVENOUS at 09:01

## 2024-01-20 RX ADMIN — MORPHINE SULFATE 2 MG: 2 INJECTION, SOLUTION INTRAMUSCULAR; INTRAVENOUS at 06:01

## 2024-01-20 RX ADMIN — MORPHINE SULFATE 4 MG: 4 INJECTION INTRAVENOUS at 06:01

## 2024-01-20 RX ADMIN — DRONABINOL 5 MG: 2.5 CAPSULE ORAL at 03:01

## 2024-01-20 RX ADMIN — GABAPENTIN 300 MG: 300 CAPSULE ORAL at 09:01

## 2024-01-20 RX ADMIN — MORPHINE SULFATE 4 MG: 4 INJECTION INTRAVENOUS at 10:01

## 2024-01-20 RX ADMIN — PANTOPRAZOLE SODIUM 40 MG: 40 TABLET, DELAYED RELEASE ORAL at 09:01

## 2024-01-20 RX ADMIN — GABAPENTIN 300 MG: 300 CAPSULE ORAL at 08:01

## 2024-01-20 RX ADMIN — ENOXAPARIN SODIUM 40 MG: 40 INJECTION SUBCUTANEOUS at 06:01

## 2024-01-20 RX ADMIN — THERA TABS 1 TABLET: TAB at 08:01

## 2024-01-20 RX ADMIN — LOPERAMIDE HYDROCHLORIDE 4 MG: 1 SOLUTION ORAL at 12:01

## 2024-01-20 NOTE — TREATMENT PLAN
GI Treatment Plan    Ivy Salgado is a 43 y.o. female admitted to hospital 1/16/2024 (Hospital Day: 5) due to Crohn's disease of both small and large intestine.     Interval History  Started IV steroids on 1/19, reports feeling better this morning. Appears her stool output is decreasing. Now on day 2 of IV steroids.   Objective  Temp:  [97.6 °F (36.4 °C)-98.5 °F (36.9 °C)] 97.7 °F (36.5 °C) (01/20 0725)  Pulse:  [70-82] 70 (01/20 0725)  BP: ()/(57-67) 118/57 (01/20 0725)  Resp:  [16-18] 18 (01/20 1044)  SpO2:  [94 %-98 %] 98 % (01/20 0725)    General: Alert, Oriented x3, no distress    Laboratory    Recent Labs   Lab 01/18/24  0307 01/19/24  0401 01/20/24  0307   HGB 11.8* 10.9* 9.8*       Lab Results   Component Value Date    WBC 8.46 01/20/2024    HGB 9.8 (L) 01/20/2024    HCT 31.3 (L) 01/20/2024    MCV 91 01/20/2024     01/20/2024       Lab Results   Component Value Date     01/20/2024    K 4.1 01/20/2024     01/20/2024    CO2 24 01/20/2024    BUN 14 01/20/2024    CREATININE 0.7 01/20/2024    CALCIUM 9.2 01/20/2024    ANIONGAP 8 01/20/2024    ESTGFRAFRICA >60.0 03/24/2022    EGFRNONAA >60.0 03/24/2022       Lab Results   Component Value Date    ALT 14 01/20/2024    AST 22 01/20/2024    ALKPHOS 138 (H) 01/20/2024    BILITOT 0.2 01/20/2024       Lab Results   Component Value Date    INR 1.0 01/10/2024    INR 1.0 01/07/2024    INR 1.0 01/06/2024   Crohn's disease of both small and large intestine  Ivy Salgado is a 43 year old female for whom GI is consulted for IBD management. 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 GI/IBD consulted for further management of high ostomy output.     Endoscopies:   01/2024- EGD- mild gastritis  01/2024-Ileoscopy- 6 ulcers in the distal ileum.  8/2017- Ileoscopy- The examined portion of the ileum was normal.   Biopsied. Widely patent end ileostomy with healthy appearing mucosa at the ileostomy. Pathology showed fragments of unremarkable small bowel mucosa. No evidence of active colitis, granuloma, dysplasia or malignancy  10/2016- EGD- Normal esophagus. Small hiatus hernia. Erythematous mucosa in the gastric body. Biopsied. Trace of hematin in the gastric body. Normal examined duodenum.  Two biopsies were obtained in the duodenal bulb. Four biopsies were obtained in the 2nd part of the duodenum.   10/2016- Ileoscopy- The examined portion of the ileum was normal. Biopsies without any acute abnormalities- CMV, HSV-1 AND HSV-2 stains performed. No celiac. No. H pylori. Normal mucosa of the small bowel.   10/2014- EGD- Normal Study. Biopsied to r/o Crohn's.    10/2014- Ileoscopy- The examined portion of the ileum was normal. Biopsies normal.  2/2014- SBE- A single (solitary) ulcer in the proximal ileum. Biopsied. Mild non-specific enteritis.   9/2013- Ileoscopy- Congested, eroded and ulcerated mucosa in the area at 5 cm proximal to the stoma. Biopsied. The examined portion of the ileum was normal otherwise up to 30 cm. Biopsied. FRAGMENTS OF NONNEOPLASTIC SMALL INTESTINAL MUCOSA WITH NO ACTIVE INFLAMMATION, ULCERATION OR DYSPLASIA PRESENT. THERE IS PRESERVATION OF THE VILLOUS ARCHITECTURE. FRAGMENTS OF NONNEOPLASTIC SMALL INTESTINAL MUCOSA WITH NO ACTIVE INFLAMMATION, ULCERATION OR DYSPLASIA IDENTIFIED.  9/2013- EGD- Normal esophagus. Z-line regular, 36 cm from the incisors. A single gastric polyp. Resected and retrieved. A small amount of food (residue) in the stomach. Removal was successful.  Mucosal abnormality in the duodenum. Biopsied. Mild chronic gastritis but otherwise negative.   2012- EGD- Normal Study.   2012- Colonoscopy- Crohn's colitis mostly involving the distal 30 cm. This was biopsied to r/o CMV, HSV. Mild ileocolonic anastomosis Crohn's disease. Chronic active colitis with surface ulceration, cryptitis, crypt abscess and reactive changes. Negative for CMV.      Imagin24 MRCP: No evidence of biliary obstruction.Punctate cystic focus in the pancreatic tail probably communicating with the main pancreatic duct, likely a side branch IPMN.  Too small to characterize although no worrisome features.  Follow-up in 1 year is recommended.  24 US liver with doppler: Mildly dilated bile ducts in the central right hepatic lobe.  Prominence of the common bile duct is better evaluated on CT 2023.  Follow-up/further evaluation as warranted clinically.  23 CT A/P with contrast-  There is decompression of a few scattered mid small bowel loops noting distension and slow flow of the distal small bowel and associated wall hyperemia. The ileostomy appears patent there is scattered interloop fluid and venous vascular engorgement involving the distended bowel loops, no findings to suggest abscess.     2021 MRE-no active inflammation.     Problem List:  Crohn's Disease of the small and large intestines, s/p total proctocolectomy with end ileostomy (), post-op course complicated by perineal wound for ~1 year (now healed), currently off all therapies    Mild Malnutrition (Albumin 3.3)   Normocytic Anemia   GERD  History of colovesical fistula s/p operative management ()  History of abdominal abscess s/p operative management ()   Drug induced pancreatitis 2/2 Imuran   High ostomy output    Hospital Course:  Output continues to be high >1.5L. We increased her imodium to max dose 4 mg 4x a day in order to try to decrease her output. We also added PRN low dose liquid  lomotil to assist with continued increased output if needed. She has not taken the PRN lomotil despite having continued high output. She continues on IVFs to assist with fluid replacement lost from ostomy output and her labs continue to be stable despite high output. On 1/19, acutely worsening abdominal pain. CTAP ordered, which did not show any signs of obstruction. IV solumedrol 60 mg x1 given on 1/19. On 119     Recommendations:  -IV solumedrol 60 mg x1 given 1/19. Now on day 2 of IV steroids (20 mg TID).   -Continue liquid imodium 4 mg QID with PRN lomotil 5ml 4 x day for continued increased output.  -Agree with IVFs for high ostomy output.  - Follow up stool studies; C diff, E.coli, stool cx negative. Fecal fat and fecal elastase normal. Awaiting GI pathogen panel, stool calprotectin.  - Will hold off on repeat endoscopy as she was just had an EGD/Ileoscopy on 1/03 and this is unlikely to  at this time.   - DVT ppx with Lovenox ordered, refused dose on 1/19 (MAR reviewed).  - IV morphine ordered for pain, okay to use given short acting nature. Would avoid escalating beyond this.  - Renvela discontinued as this may actually worsening GI sxs and is not beneficial in someone without CKD. Her phos is now normal.   - Would avoid use of NSAIDs.   - We will continue to follow.    Thank you for involving us in the care of Ivy Salgado. Please call with any additional questions, concerns or changes in the patient's clinical status.    Beronica Holguin MD  Gastroenterology

## 2024-01-20 NOTE — PROGRESS NOTES
[facility] Medicine  Progress Note    Patient Name: Ivy Salgado  MRN: [id number]  Patient Class: IP- Inpatient   Admission Date: 1/16/2024  Length of Stay: 4 days  Attending Physician: Philippe Lee, *  Primary Care Provider: Luis Madden DO        Subjective:     Principal Problem:Crohn's disease of both small and large intestine        HPI:  Patient is a 44 yo female with PMH of Crohn's disease s/p ileostomy, GERD, and ARPITA who presented from GI clinic at the request for admission from Dr. Peace Garcia for ongoing high ileostomy output. Of note, she was recently hospitalized 12/31-01/07 for similar presentation. She is reporting generalized 8/10 abdominal pain, nausea, high ileostomy output, decreased appetite,and heartburn. She states her symptoms are similar the symptoms she used to have with Crohn's flares in the past, but she reports she hasn't had a flare since 2020. She reports normally emptying her ileostomy bag 2-3 times after meals, but in the last few weeks, she has had to continuously empty the bag as high as 12 times even when not eating meals. Her appetite has been decreased, reporting only eating half of her normal intake. During her recent admission, she had a formal workup with EGD/ileoscopy and stool studies that were largely unrevealing aside from multiple ulcers in the prepyloric region. She denies fever, chills, chest pain, SOB, palpitations, headache, vision changes, skin changes, vomiting, urinary symptoms, blood in stool, weight loss, or weakness.     She arrived to [facility] as a direct admission with plans to be started on IVF, anti-diarrheals, pain medications, and plans for stool studies and repeat ileoscopy. She was admitted to hospital medicine for symptomatic care and management of high ileostomy output and potential Crohn's flare with consult to IBD team.    Overview/Hospital Course:  Patient was admitted as a direct admit from GI clinic due to high  ileostomy output. She was treated with continuous IVF, anti-diarrheals, and pain medications as needed. GI/IBD were consulted, recommended continued supportive treatment for time being. No plans for repeat ileoscopy as she recently completed one during last admission, biopsy from which showed active enteritis with ulceration. At this time CDiff studies negative, E. Coli Ag negative, and Stool culture with no growth. Patient was started on Solumedrol for presumed Crohn's flare. On 01/19, patient developed acute worsening abdominal pain, and a CTAP was ordered.     Interval History: NAEON. AFVSS. Patient reports pain has been well controlled with IV pain meds. She states the contents in her ileostomy have been thickening, and the frequency of how often she empties the bag has decreased. Continuing IV fluids, pain medications, and anti-diarrheals. Continuing Solumedrol 20 mg IV TID.     Review of Systems  Objective:     Vital Signs (Most Recent):  Temp: 98.5 °F (36.9 °C) (01/20/24 1201)  Pulse: 73 (01/20/24 1201)  Resp: 18 (01/20/24 1213)  BP: (!) 95/55 (01/20/24 1201)  SpO2: 96 % (01/20/24 1201) Vital Signs (24h Range):  Temp:  [97.6 °F (36.4 °C)-98.5 °F (36.9 °C)] 98.5 °F (36.9 °C)  Pulse:  [70-82] 73  Resp:  [16-20] 18  SpO2:  [94 %-98 %] 96 %  BP: ()/(55-67) 95/55     Weight: 51.3 kg (113 lb 1.5 oz)  Body mass index is 21.37 kg/m².    Intake/Output Summary (Last 24 hours) at 1/20/2024 1349  Last data filed at 1/19/2024 2327  Gross per 24 hour   Intake --   Output 290 ml   Net -290 ml         Physical Exam  Vitals and nursing note reviewed.   Constitutional:       General: She is not in acute distress.     Appearance: Normal appearance. She is normal weight. She is not ill-appearing.   HENT:      Head: Normocephalic and atraumatic.      Mouth/Throat:      Mouth: Mucous membranes are moist.      Pharynx: Oropharynx is clear.   Eyes:      General: No scleral icterus.     Extraocular Movements: Extraocular  movements intact.      Conjunctiva/sclera: Conjunctivae normal.      Pupils: Pupils are equal, round, and reactive to light.   Cardiovascular:      Rate and Rhythm: Normal rate and regular rhythm.      Pulses: Normal pulses.      Heart sounds: Normal heart sounds.   Pulmonary:      Effort: Pulmonary effort is normal. No respiratory distress.      Breath sounds: Normal breath sounds. No wheezing.   Abdominal:      General: Bowel sounds are normal. There is no distension.      Palpations: Abdomen is soft.      Tenderness: There is abdominal tenderness. There is no guarding or rebound.      Comments: Ileostomy bag in place, no erythema or signs of leakage. Site is clean, dry and intact   Musculoskeletal:         General: No tenderness.      Right lower leg: No edema.      Left lower leg: No edema.   Skin:     General: Skin is warm and dry.      Coloration: Skin is not pale.      Findings: No erythema or rash.   Neurological:      General: No focal deficit present.      Mental Status: She is alert and oriented to person, place, and time. Mental status is at baseline.      Cranial Nerves: No cranial nerve deficit.   Psychiatric:         Mood and Affect: Mood normal.         Behavior: Behavior normal.             Significant Labs: All pertinent labs within the past 24 hours have been reviewed.    Significant Imaging: I have reviewed all pertinent imaging results/findings within the past 24 hours.    Assessment/Plan:      * Crohn's disease of both small and large intestine  43-year-old woman with PMH GERD, Crohn's s/p ileostomy presented to GI clinic with high ileostomy output 01/16 Recent admission to [facility] (12/31-1/7) with Crohn's flare.  Admission requested by GI (Dr. Peace Garcia) for IVF, ileostomy through stoma to see if Crohn's is worse, stool studies and treatment to get her output down.  Patient reports symptoms of nausea, abdominal pain, and high ileostomy output to be similar to past Crohn's flares.   Upon  transfer, HDS and AF. CBC unremarkable. CMP unremarkable. ESR 65. Mg 1.2. Phos 5.3. . CRP 1.8    Plan:  - Consulted GI/IBD Team; not recommending repeat ileosocpy  - Stool Culture with no growth to date  - C.Diff negative; E Coli Ag negative  - F/u Blood cultures, patient reporting chills and diaphoresis on 1/18. NGTD thus far.  - Continuing Solumedrol 20 mg IV TID.  - Continuous IVF - LR @ 100/hr  - Anti-diarrheals  - Daily CBC, CMP, Mg, and Phos    Gastroesophageal reflux disease  - Protonix 40 mg BID      Nausea  - Phenergan 25mg q6h PRN first choice  - Compazine 2.5mg q6h PRN second choice    High output ileostomy  Patient reports normally emptying her ileostomy 2-3x following each meal at baseline; on admission, she reports having to empty her 12-15x per day.    - Anti-diarrheals scheduled  - Continuous IVF  - Replete electrolytes as needed  - Continue regular diet    Abdominal pain  - Morphine 4mg IV q4h prn severe pain  - Morphine 2mg IV q4h breakthrough pain   - CTAP due to acute worsening pain (01/19); negative for acute process, however was done without oral contrast. Consider repeating CTAP with oral contrast    Generalized anxiety disorder    - Restarted home mirtazapine.     S/P ileostomy  Ileostomy placed as a result of total proctocolectomy due to fistulizing Crohn's disease.        VTE Risk Mitigation (From admission, onward)           Ordered     enoxaparin injection 40 mg  Every 24 hours         01/16/24 1429     Reason for No Pharmacological VTE Prophylaxis  Once        Question:  Reasons:  Answer:  Risk of Bleeding    01/16/24 1335     IP VTE HIGH RISK PATIENT  Once         01/16/24 1335     Place sequential compression device  Until discontinued         01/16/24 1335                    Discharge Planning   CHRISTOPH: 1/22/2024     Code Status: Full Code   Is the patient medically ready for discharge?: No    Reason for patient still in hospital (select all that apply): Patient trending  condition  Discharge Plan A: Home with family   Discharge Delays: None known at this time              Lb Meadows DO  Department of Hospital Medicine   [facility]

## 2024-01-20 NOTE — SUBJECTIVE & OBJECTIVE
Interval History: NAEON. AFVSS. Patient reports pain has been well controlled with IV pain meds. She states the contents in her ileostomy have been thickening, and the frequency of how often she empties the bag has decreased. Continuing IV fluids, pain medications, and anti-diarrheals. Continuing Solumedrol 20 mg IV TID.     Review of Systems  Objective:     Vital Signs (Most Recent):  Temp: 98.5 °F (36.9 °C) (01/20/24 1201)  Pulse: 73 (01/20/24 1201)  Resp: 18 (01/20/24 1213)  BP: (!) 95/55 (01/20/24 1201)  SpO2: 96 % (01/20/24 1201) Vital Signs (24h Range):  Temp:  [97.6 °F (36.4 °C)-98.5 °F (36.9 °C)] 98.5 °F (36.9 °C)  Pulse:  [70-82] 73  Resp:  [16-20] 18  SpO2:  [94 %-98 %] 96 %  BP: ()/(55-67) 95/55     Weight: 51.3 kg (113 lb 1.5 oz)  Body mass index is 21.37 kg/m².    Intake/Output Summary (Last 24 hours) at 1/20/2024 1349  Last data filed at 1/19/2024 2327  Gross per 24 hour   Intake --   Output 290 ml   Net -290 ml         Physical Exam  Vitals and nursing note reviewed.   Constitutional:       General: She is not in acute distress.     Appearance: Normal appearance. She is normal weight. She is not ill-appearing.   HENT:      Head: Normocephalic and atraumatic.      Mouth/Throat:      Mouth: Mucous membranes are moist.      Pharynx: Oropharynx is clear.   Eyes:      General: No scleral icterus.     Extraocular Movements: Extraocular movements intact.      Conjunctiva/sclera: Conjunctivae normal.      Pupils: Pupils are equal, round, and reactive to light.   Cardiovascular:      Rate and Rhythm: Normal rate and regular rhythm.      Pulses: Normal pulses.      Heart sounds: Normal heart sounds.   Pulmonary:      Effort: Pulmonary effort is normal. No respiratory distress.      Breath sounds: Normal breath sounds. No wheezing.   Abdominal:      General: Bowel sounds are normal. There is no distension.      Palpations: Abdomen is soft.      Tenderness: There is abdominal tenderness. There is no  guarding or rebound.      Comments: Ileostomy bag in place, no erythema or signs of leakage. Site is clean, dry and intact   Musculoskeletal:         General: No tenderness.      Right lower leg: No edema.      Left lower leg: No edema.   Skin:     General: Skin is warm and dry.      Coloration: Skin is not pale.      Findings: No erythema or rash.   Neurological:      General: No focal deficit present.      Mental Status: She is alert and oriented to person, place, and time. Mental status is at baseline.      Cranial Nerves: No cranial nerve deficit.   Psychiatric:         Mood and Affect: Mood normal.         Behavior: Behavior normal.             Significant Labs: All pertinent labs within the past 24 hours have been reviewed.    Significant Imaging: I have reviewed all pertinent imaging results/findings within the past 24 hours.

## 2024-01-20 NOTE — ASSESSMENT & PLAN NOTE
43-year-old woman with PMH GERD, Crohn's s/p ileostomy presented to GI clinic with high ileostomy output 01/16 Recent admission to [facility] (12/31-1/7) with Crohn's flare.  Admission requested by GI (Dr. Peace Garcia) for IVF, ileostomy through stoma to see if Crohn's is worse, stool studies and treatment to get her output down.  Patient reports symptoms of nausea, abdominal pain, and high ileostomy output to be similar to past Crohn's flares.   Upon transfer, HDS and AF. CBC unremarkable. CMP unremarkable. ESR 65. Mg 1.2. Phos 5.3. . CRP 1.8    Plan:  - Consulted GI/IBD Team; not recommending repeat ileosocpy  - Stool Culture with no growth to date  - C.Diff negative; E Coli Ag negative  - F/u Blood cultures, patient reporting chills and diaphoresis on 1/18. NGTD thus far.  - Continuing Solumedrol 20 mg IV TID.  - Continuous IVF - LR @ 100/hr  - Anti-diarrheals  - Daily CBC, CMP, Mg, and Phos

## 2024-01-20 NOTE — ASSESSMENT & PLAN NOTE
Patient reports normally emptying her ileostomy 2-3x following each meal at baseline; on admission, she reports having to empty her 12-15x per day.    - Anti-diarrheals scheduled  - Continuous IVF  - Replete electrolytes as needed  - Continue regular diet

## 2024-01-20 NOTE — ASSESSMENT & PLAN NOTE
- Morphine 4mg IV q4h prn severe pain  - Morphine 2mg IV q4h breakthrough pain   - CTAP due to acute worsening pain (01/19); negative for acute process, however was done without oral contrast. Consider repeating CTAP with oral contrast

## 2024-01-21 LAB
ALBUMIN SERPL BCP-MCNC: 3.3 G/DL (ref 3.5–5.2)
ALP SERPL-CCNC: 129 U/L (ref 55–135)
ALT SERPL W/O P-5'-P-CCNC: 13 U/L (ref 10–44)
ANION GAP SERPL CALC-SCNC: 7 MMOL/L (ref 8–16)
AST SERPL-CCNC: 13 U/L (ref 10–40)
BASOPHILS # BLD AUTO: 0.01 K/UL (ref 0–0.2)
BASOPHILS NFR BLD: 0.1 % (ref 0–1.9)
BILIRUB SERPL-MCNC: 0.2 MG/DL (ref 0.1–1)
BUN SERPL-MCNC: 13 MG/DL (ref 6–20)
CALCIUM SERPL-MCNC: 9.6 MG/DL (ref 8.7–10.5)
CHLORIDE SERPL-SCNC: 108 MMOL/L (ref 95–110)
CO2 SERPL-SCNC: 25 MMOL/L (ref 23–29)
CREAT SERPL-MCNC: 0.7 MG/DL (ref 0.5–1.4)
CRP SERPL-MCNC: 3.3 MG/L (ref 0–8.2)
DIFFERENTIAL METHOD BLD: ABNORMAL
EOSINOPHIL # BLD AUTO: 0 K/UL (ref 0–0.5)
EOSINOPHIL NFR BLD: 0 % (ref 0–8)
ERYTHROCYTE [DISTWIDTH] IN BLOOD BY AUTOMATED COUNT: 13.1 % (ref 11.5–14.5)
ERYTHROCYTE [SEDIMENTATION RATE] IN BLOOD BY PHOTOMETRIC METHOD: 75 MM/HR (ref 0–36)
EST. GFR  (NO RACE VARIABLE): >60 ML/MIN/1.73 M^2
GLUCOSE SERPL-MCNC: 115 MG/DL (ref 70–110)
HCT VFR BLD AUTO: 33.1 % (ref 37–48.5)
HGB BLD-MCNC: 10.4 G/DL (ref 12–16)
IMM GRANULOCYTES # BLD AUTO: 0.03 K/UL (ref 0–0.04)
IMM GRANULOCYTES NFR BLD AUTO: 0.3 % (ref 0–0.5)
LYMPHOCYTES # BLD AUTO: 1.6 K/UL (ref 1–4.8)
LYMPHOCYTES NFR BLD: 18 % (ref 18–48)
MAGNESIUM SERPL-MCNC: 1.9 MG/DL (ref 1.6–2.6)
MCH RBC QN AUTO: 28.5 PG (ref 27–31)
MCHC RBC AUTO-ENTMCNC: 31.4 G/DL (ref 32–36)
MCV RBC AUTO: 91 FL (ref 82–98)
MONOCYTES # BLD AUTO: 0.4 K/UL (ref 0.3–1)
MONOCYTES NFR BLD: 4 % (ref 4–15)
NEUTROPHILS # BLD AUTO: 6.9 K/UL (ref 1.8–7.7)
NEUTROPHILS NFR BLD: 77.6 % (ref 38–73)
NRBC BLD-RTO: 0 /100 WBC
PHOSPHATE SERPL-MCNC: 4 MG/DL (ref 2.7–4.5)
PLATELET # BLD AUTO: 390 K/UL (ref 150–450)
PMV BLD AUTO: 9.6 FL (ref 9.2–12.9)
POTASSIUM SERPL-SCNC: 4.7 MMOL/L (ref 3.5–5.1)
PROT SERPL-MCNC: 7.6 G/DL (ref 6–8.4)
RBC # BLD AUTO: 3.65 M/UL (ref 4–5.4)
SODIUM SERPL-SCNC: 140 MMOL/L (ref 136–145)
WBC # BLD AUTO: 8.91 K/UL (ref 3.9–12.7)

## 2024-01-21 PROCEDURE — 11000001 HC ACUTE MED/SURG PRIVATE ROOM

## 2024-01-21 PROCEDURE — 86140 C-REACTIVE PROTEIN: CPT

## 2024-01-21 PROCEDURE — 25000003 PHARM REV CODE 250

## 2024-01-21 PROCEDURE — 85652 RBC SED RATE AUTOMATED: CPT

## 2024-01-21 PROCEDURE — 63600175 PHARM REV CODE 636 W HCPCS: Performed by: STUDENT IN AN ORGANIZED HEALTH CARE EDUCATION/TRAINING PROGRAM

## 2024-01-21 PROCEDURE — 85025 COMPLETE CBC W/AUTO DIFF WBC: CPT

## 2024-01-21 PROCEDURE — 63600175 PHARM REV CODE 636 W HCPCS

## 2024-01-21 PROCEDURE — 36415 COLL VENOUS BLD VENIPUNCTURE: CPT | Mod: XB

## 2024-01-21 PROCEDURE — 80053 COMPREHEN METABOLIC PANEL: CPT

## 2024-01-21 PROCEDURE — 84100 ASSAY OF PHOSPHORUS: CPT

## 2024-01-21 PROCEDURE — 36415 COLL VENOUS BLD VENIPUNCTURE: CPT

## 2024-01-21 PROCEDURE — 83735 ASSAY OF MAGNESIUM: CPT

## 2024-01-21 RX ORDER — NADOLOL 20 MG/1
40 TABLET ORAL DAILY
Status: DISCONTINUED | OUTPATIENT
Start: 2024-01-21 | End: 2024-01-27 | Stop reason: HOSPADM

## 2024-01-21 RX ADMIN — PROMETHAZINE HYDROCHLORIDE 25 MG: 25 TABLET ORAL at 03:01

## 2024-01-21 RX ADMIN — METHYLPREDNISOLONE SODIUM SUCCINATE 20 MG: 40 INJECTION, POWDER, FOR SOLUTION INTRAMUSCULAR; INTRAVENOUS at 03:01

## 2024-01-21 RX ADMIN — LOPERAMIDE HYDROCHLORIDE 4 MG: 1 SOLUTION ORAL at 12:01

## 2024-01-21 RX ADMIN — DRONABINOL 5 MG: 2.5 CAPSULE ORAL at 03:01

## 2024-01-21 RX ADMIN — GABAPENTIN 300 MG: 300 CAPSULE ORAL at 08:01

## 2024-01-21 RX ADMIN — PROMETHAZINE HYDROCHLORIDE 25 MG: 25 TABLET ORAL at 09:01

## 2024-01-21 RX ADMIN — DRONABINOL 5 MG: 2.5 CAPSULE ORAL at 05:01

## 2024-01-21 RX ADMIN — METHYLPREDNISOLONE SODIUM SUCCINATE 20 MG: 40 INJECTION, POWDER, FOR SOLUTION INTRAMUSCULAR; INTRAVENOUS at 08:01

## 2024-01-21 RX ADMIN — MORPHINE SULFATE 2 MG: 2 INJECTION, SOLUTION INTRAMUSCULAR; INTRAVENOUS at 01:01

## 2024-01-21 RX ADMIN — THERA TABS 1 TABLET: TAB at 08:01

## 2024-01-21 RX ADMIN — METHYLPREDNISOLONE SODIUM SUCCINATE 20 MG: 40 INJECTION, POWDER, FOR SOLUTION INTRAMUSCULAR; INTRAVENOUS at 09:01

## 2024-01-21 RX ADMIN — CLONAZEPAM 1 MG: 0.5 TABLET ORAL at 09:01

## 2024-01-21 RX ADMIN — MORPHINE SULFATE 4 MG: 4 INJECTION INTRAVENOUS at 09:01

## 2024-01-21 RX ADMIN — MORPHINE SULFATE 4 MG: 4 INJECTION INTRAVENOUS at 04:01

## 2024-01-21 RX ADMIN — MORPHINE SULFATE 4 MG: 4 INJECTION INTRAVENOUS at 03:01

## 2024-01-21 RX ADMIN — MORPHINE SULFATE 2 MG: 2 INJECTION, SOLUTION INTRAMUSCULAR; INTRAVENOUS at 08:01

## 2024-01-21 RX ADMIN — MORPHINE SULFATE 2 MG: 2 INJECTION, SOLUTION INTRAMUSCULAR; INTRAVENOUS at 12:01

## 2024-01-21 RX ADMIN — PROMETHAZINE HYDROCHLORIDE 25 MG: 25 TABLET ORAL at 01:01

## 2024-01-21 RX ADMIN — CLONAZEPAM 1 MG: 0.5 TABLET ORAL at 08:01

## 2024-01-21 RX ADMIN — LOPERAMIDE HYDROCHLORIDE 4 MG: 1 SOLUTION ORAL at 08:01

## 2024-01-21 RX ADMIN — MIRTAZAPINE 30 MG: 15 TABLET, ORALLY DISINTEGRATING ORAL at 09:01

## 2024-01-21 RX ADMIN — ENOXAPARIN SODIUM 40 MG: 40 INJECTION SUBCUTANEOUS at 06:01

## 2024-01-21 RX ADMIN — PANTOPRAZOLE SODIUM 40 MG: 40 TABLET, DELAYED RELEASE ORAL at 08:01

## 2024-01-21 RX ADMIN — LOPERAMIDE HYDROCHLORIDE 4 MG: 1 SOLUTION ORAL at 09:01

## 2024-01-21 RX ADMIN — MORPHINE SULFATE 2 MG: 2 INJECTION, SOLUTION INTRAMUSCULAR; INTRAVENOUS at 06:01

## 2024-01-21 RX ADMIN — PANTOPRAZOLE SODIUM 40 MG: 40 TABLET, DELAYED RELEASE ORAL at 09:01

## 2024-01-21 RX ADMIN — GABAPENTIN 300 MG: 300 CAPSULE ORAL at 09:01

## 2024-01-21 RX ADMIN — SODIUM CHLORIDE, SODIUM LACTATE, POTASSIUM CHLORIDE, AND CALCIUM CHLORIDE: 600; 310; 30; 20 INJECTION, SOLUTION INTRAVENOUS at 04:01

## 2024-01-21 RX ADMIN — LOPERAMIDE HYDROCHLORIDE 4 MG: 1 SOLUTION ORAL at 06:01

## 2024-01-21 RX ADMIN — PROMETHAZINE HYDROCHLORIDE 25 MG: 25 TABLET ORAL at 08:01

## 2024-01-21 NOTE — SUBJECTIVE & OBJECTIVE
Interval History: NAEON. AFVSS. Patient was lying in bed comfortable and states her pain has been well controlled with current pain regimen. Also reports decreased ileostomy output with less frequent bag emptying's. Discussed with patient the plan to wean down opiate use and space out how often they're administered. Continuing IVF, Solumedrol IV, pain meds, and anti-diarrheals.    Review of Systems  Objective:     Vital Signs (Most Recent):  Temp: 98.2 °F (36.8 °C) (01/21/24 0715)  Pulse: 65 (01/21/24 0715)  Resp: 18 (01/21/24 0956)  BP: 129/78 (01/21/24 0715)  SpO2: 98 % (01/21/24 0715) Vital Signs (24h Range):  Temp:  [98 °F (36.7 °C)-98.5 °F (36.9 °C)] 98.2 °F (36.8 °C)  Pulse:  [65-75] 65  Resp:  [12-20] 18  SpO2:  [96 %-100 %] 98 %  BP: ()/(55-81) 129/78     Weight: 51.3 kg (113 lb 1.5 oz)  Body mass index is 21.37 kg/m².    Intake/Output Summary (Last 24 hours) at 1/21/2024 1155  Last data filed at 1/20/2024 1815  Gross per 24 hour   Intake --   Output 665 ml   Net -665 ml         Physical Exam  Vitals and nursing note reviewed.   Constitutional:       General: She is not in acute distress.     Appearance: Normal appearance. She is normal weight. She is not ill-appearing.   HENT:      Head: Normocephalic and atraumatic.      Mouth/Throat:      Mouth: Mucous membranes are moist.      Pharynx: Oropharynx is clear.   Eyes:      General: No scleral icterus.     Extraocular Movements: Extraocular movements intact.      Conjunctiva/sclera: Conjunctivae normal.      Pupils: Pupils are equal, round, and reactive to light.   Cardiovascular:      Rate and Rhythm: Normal rate and regular rhythm.      Pulses: Normal pulses.      Heart sounds: Normal heart sounds.   Pulmonary:      Effort: Pulmonary effort is normal. No respiratory distress.      Breath sounds: Normal breath sounds. No wheezing.   Abdominal:      General: Bowel sounds are normal. There is no distension.      Palpations: Abdomen is soft.       Tenderness: There is abdominal tenderness. There is no guarding or rebound.      Comments: Ileostomy bag in place, no erythema or signs of leakage. Site is clean, dry and intact   Musculoskeletal:         General: No tenderness.      Right lower leg: No edema.      Left lower leg: No edema.   Skin:     General: Skin is warm and dry.      Coloration: Skin is not pale.      Findings: No erythema or rash.   Neurological:      General: No focal deficit present.      Mental Status: She is alert and oriented to person, place, and time. Mental status is at baseline.      Cranial Nerves: No cranial nerve deficit.   Psychiatric:         Mood and Affect: Mood normal.         Behavior: Behavior normal.             Significant Labs: All pertinent labs within the past 24 hours have been reviewed.    Significant Imaging: I have reviewed all pertinent imaging results/findings within the past 24 hours.

## 2024-01-21 NOTE — PROGRESS NOTES
[facility] Medicine  Progress Note    Patient Name: Ivy Salgado  MRN: [id number]  Patient Class: IP- Inpatient   Admission Date: 1/16/2024  Length of Stay: 5 days  Attending Physician: Philippe Lee, *  Primary Care Provider: Luis Madden DO        Subjective:     Principal Problem:Crohn's disease of both small and large intestine        HPI:  Patient is a 44 yo female with PMH of Crohn's disease s/p ileostomy, GERD, and ARPITA who presented from GI clinic at the request for admission from Dr. Peace Garcia for ongoing high ileostomy output. Of note, she was recently hospitalized 12/31-01/07 for similar presentation. She is reporting generalized 8/10 abdominal pain, nausea, high ileostomy output, decreased appetite,and heartburn. She states her symptoms are similar the symptoms she used to have with Crohn's flares in the past, but she reports she hasn't had a flare since 2020. She reports normally emptying her ileostomy bag 2-3 times after meals, but in the last few weeks, she has had to continuously empty the bag as high as 12 times even when not eating meals. Her appetite has been decreased, reporting only eating half of her normal intake. During her recent admission, she had a formal workup with EGD/ileoscopy and stool studies that were largely unrevealing aside from multiple ulcers in the prepyloric region. She denies fever, chills, chest pain, SOB, palpitations, headache, vision changes, skin changes, vomiting, urinary symptoms, blood in stool, weight loss, or weakness.     She arrived to [facility] [place] [facility] as a direct admission with plans to be started on IVF, anti-diarrheals, pain medications, and plans for stool studies and repeat ileoscopy. She was admitted to hospital medicine for symptomatic care and management of high ileostomy output and potential Crohn's flare with consult to IBD team.    Overview/Hospital Course:  Patient was admitted as a direct admit from GI clinic due to high  ileostomy output. She was treated with continuous IVF, anti-diarrheals, and pain medications as needed. GI/IBD were consulted, recommended continued supportive treatment for time being. No plans for repeat ileoscopy as she recently completed one during last admission, biopsy from which showed active enteritis with ulceration. At this time CDiff studies negative, E. Coli Ag negative, and Stool culture with no growth. Patient was started on Solumedrol for presumed Crohn's flare. On 01/19, patient developed acute worsening abdominal pain, and a CTAP with IV contrast was ordered, which was negative for any acute abnormality.     Interval History: NAEON. AFVSS. Patient was lying in bed comfortable and states her pain has been well controlled with current pain regimen. Also reports decreased ileostomy output with less frequent bag emptying's. Discussed with patient the plan to wean down opiate use and space out how often they're administered. Continuing IVF, Solumedrol IV, pain meds, and anti-diarrheals.    Review of Systems  Objective:     Vital Signs (Most Recent):  Temp: 98.2 °F (36.8 °C) (01/21/24 0715)  Pulse: 65 (01/21/24 0715)  Resp: 18 (01/21/24 0956)  BP: 129/78 (01/21/24 0715)  SpO2: 98 % (01/21/24 0715) Vital Signs (24h Range):  Temp:  [98 °F (36.7 °C)-98.5 °F (36.9 °C)] 98.2 °F (36.8 °C)  Pulse:  [65-75] 65  Resp:  [12-20] 18  SpO2:  [96 %-100 %] 98 %  BP: ()/(55-81) 129/78     Weight: 51.3 kg (113 lb 1.5 oz)  Body mass index is 21.37 kg/m².    Intake/Output Summary (Last 24 hours) at 1/21/2024 1155  Last data filed at 1/20/2024 1815  Gross per 24 hour   Intake --   Output 665 ml   Net -665 ml         Physical Exam  Vitals and nursing note reviewed.   Constitutional:       General: She is not in acute distress.     Appearance: Normal appearance. She is normal weight. She is not ill-appearing.   HENT:      Head: Normocephalic and atraumatic.      Mouth/Throat:      Mouth: Mucous membranes are moist.       Pharynx: Oropharynx is clear.   Eyes:      General: No scleral icterus.     Extraocular Movements: Extraocular movements intact.      Conjunctiva/sclera: Conjunctivae normal.      Pupils: Pupils are equal, round, and reactive to light.   Cardiovascular:      Rate and Rhythm: Normal rate and regular rhythm.      Pulses: Normal pulses.      Heart sounds: Normal heart sounds.   Pulmonary:      Effort: Pulmonary effort is normal. No respiratory distress.      Breath sounds: Normal breath sounds. No wheezing.   Abdominal:      General: Bowel sounds are normal. There is no distension.      Palpations: Abdomen is soft.      Tenderness: There is abdominal tenderness. There is no guarding or rebound.      Comments: Ileostomy bag in place, no erythema or signs of leakage. Site is clean, dry and intact   Musculoskeletal:         General: No tenderness.      Right lower leg: No edema.      Left lower leg: No edema.   Skin:     General: Skin is warm and dry.      Coloration: Skin is not pale.      Findings: No erythema or rash.   Neurological:      General: No focal deficit present.      Mental Status: She is alert and oriented to person, place, and time. Mental status is at baseline.      Cranial Nerves: No cranial nerve deficit.   Psychiatric:         Mood and Affect: Mood normal.         Behavior: Behavior normal.             Significant Labs: All pertinent labs within the past 24 hours have been reviewed.    Significant Imaging: I have reviewed all pertinent imaging results/findings within the past 24 hours.    Assessment/Plan:      * Crohn's disease of both small and large intestine  43-year-old woman with PMH GERD, Crohn's s/p ileostomy presented to GI clinic with high ileostomy output 01/16 Recent admission to [facility] (12/31-1/7) with Crohn's flare.  Admission requested by GI (Dr. Peace Garcia) for IVF, ileostomy through stoma to see if Crohn's is worse, stool studies and treatment to get her output down.  Patient reports  symptoms of nausea, abdominal pain, and high ileostomy output to be similar to past Crohn's flares.   Upon transfer, HDS and AF. CBC unremarkable. CMP unremarkable. ESR 65. Mg 1.2. Phos 5.3. . CRP 1.8    Plan:  - Consulted GI/IBD Team; not recommending repeat ileosocpy  - Stool Culture with no growth to date  - C.Diff negative; E Coli Ag negative  - F/u Blood cultures, patient reporting chills and diaphoresis on 1/18. NGTD thus far.  - Continuing Solumedrol 20 mg IV TID.  - Continuous IVF - LR @ 100/hr  - Anti-diarrheals  - Daily CBC, CMP, Mg, and Phos    Gastroesophageal reflux disease  - Protonix 40 mg BID      Nausea  - Phenergan 25mg q6h PRN first choice  - Compazine 2.5mg q6h PRN second choice    High output ileostomy  Patient reports normally emptying her ileostomy 2-3x following each meal at baseline; on admission, she reports having to empty her 12-15x per day.    - Anti-diarrheals scheduled  - Continuous IVF  - Replete electrolytes as needed  - Continue regular diet    Abdominal pain  - Morphine 4mg IV q4h prn severe pain  - Morphine 2mg IV q4h breakthrough pain   - CTAP due to acute worsening pain (01/19); negative for acute process, however was done without oral contrast. Consider repeating CTAP with oral contrast    Generalized anxiety disorder    - Restarted home mirtazapine.     S/P ileostomy  Ileostomy placed as a result of total proctocolectomy due to fistulizing Crohn's disease.        VTE Risk Mitigation (From admission, onward)           Ordered     enoxaparin injection 40 mg  Every 24 hours         01/16/24 1427     Reason for No Pharmacological VTE Prophylaxis  Once        Question:  Reasons:  Answer:  Risk of Bleeding    01/16/24 1335     IP VTE HIGH RISK PATIENT  Once         01/16/24 1335     Place sequential compression device  Until discontinued         01/16/24 1335                    Discharge Planning   CHRISTOPH: 1/22/2024     Code Status: Full Code   Is the patient medically ready for  discharge?: No    Reason for patient still in hospital (select all that apply): Patient trending condition  Discharge Plan A: Home with family   Discharge Delays: None known at this time              Lb Meadows DO  Department of Hospital Medicine   [facility]

## 2024-01-21 NOTE — NURSING
Notified Dr. Lee that the patient reported some bleeding from her stoma bud when she changed the bag. She blotted the stoma with a towel and there was a small amount of blood on the towel but that it was not still bleeding.

## 2024-01-21 NOTE — TREATMENT PLAN
GI Treatment Plan    Ivy Salgado is a 43 y.o. female admitted to hospital 1/16/2024 (Hospital Day: 6) due to Crohn's disease of both small and large intestine.     Interval History  Per chart review and primary team, patient feeling better today. Stool output is decreasing, 665 recorded overnight. Today is day #3 of IV steroids.     Objective  Temp:  [97.8 °F (36.6 °C)-98.5 °F (36.9 °C)] 97.8 °F (36.6 °C) (01/21 1218)  Pulse:  [65-75] 71 (01/21 1218)  BP: ()/(62-84) 131/84 (01/21 1218)  Resp:  [12-18] 18 (01/21 1518)  SpO2:  [97 %-100 %] 98 % (01/21 1218)      Laboratory    Recent Labs   Lab 01/19/24  0401 01/20/24  0307 01/21/24  0705   HGB 10.9* 9.8* 10.4*       Lab Results   Component Value Date    WBC 8.91 01/21/2024    HGB 10.4 (L) 01/21/2024    HCT 33.1 (L) 01/21/2024    MCV 91 01/21/2024     01/21/2024       Lab Results   Component Value Date     01/21/2024    K 4.7 01/21/2024     01/21/2024    CO2 25 01/21/2024    BUN 13 01/21/2024    CREATININE 0.7 01/21/2024    CALCIUM 9.6 01/21/2024    ANIONGAP 7 (L) 01/21/2024    ESTGFRAFRICA >60.0 03/24/2022    EGFRNONAA >60.0 03/24/2022       Lab Results   Component Value Date    ALT 13 01/21/2024    AST 13 01/21/2024    ALKPHOS 129 01/21/2024    BILITOT 0.2 01/21/2024       Lab Results   Component Value Date    INR 1.0 01/10/2024    INR 1.0 01/07/2024    INR 1.0 01/06/2024   Crohn's disease of both small and large intestine  Ivy Salgdao is a 43 year old female for whom GI is consulted for IBD management. 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 GI/IBD consulted for further management of high ostomy output.     Endoscopies:   01/2024- EGD- mild gastritis  01/2024-Ileoscopy- 6 ulcers in the distal ileum.  8/2017- Ileoscopy- The examined portion of the ileum was normal.   Biopsied. Widely patent end ileostomy with healthy appearing mucosa at the ileostomy. Pathology showed fragments of unremarkable small bowel mucosa. No evidence of active colitis, granuloma, dysplasia or malignancy  10/2016- EGD- Normal esophagus. Small hiatus hernia. Erythematous mucosa in the gastric body. Biopsied. Trace of hematin in the gastric body. Normal examined duodenum.  Two biopsies were obtained in the duodenal bulb. Four biopsies were obtained in the 2nd part of the duodenum.   10/2016- Ileoscopy- The examined portion of the ileum was normal. Biopsies without any acute abnormalities- CMV, HSV-1 AND HSV-2 stains performed. No celiac. No. H pylori. Normal mucosa of the small bowel.   10/2014- EGD- Normal Study. Biopsied to r/o Crohn's.    10/2014- Ileoscopy- The examined portion of the ileum was normal. Biopsies normal.  2/2014- SBE- A single (solitary) ulcer in the proximal ileum. Biopsied. Mild non-specific enteritis.   9/2013- Ileoscopy- Congested, eroded and ulcerated mucosa in the area at 5 cm proximal to the stoma. Biopsied. The examined portion of the ileum was normal otherwise up to 30 cm. Biopsied. FRAGMENTS OF NONNEOPLASTIC SMALL INTESTINAL MUCOSA WITH NO ACTIVE INFLAMMATION, ULCERATION OR DYSPLASIA PRESENT. THERE IS PRESERVATION OF THE VILLOUS ARCHITECTURE. FRAGMENTS OF NONNEOPLASTIC SMALL INTESTINAL MUCOSA WITH NO ACTIVE INFLAMMATION, ULCERATION OR DYSPLASIA IDENTIFIED.  9/2013- EGD- Normal esophagus. Z-line regular, 36 cm from the incisors. A single gastric polyp. Resected and retrieved. A small amount of food (residue) in the stomach. Removal was successful. Mucosal abnormality in  the duodenum. Biopsied. Mild chronic gastritis but otherwise negative.   2012- EGD- Normal Study.   2012- Colonoscopy- Crohn's colitis mostly involving the distal 30 cm. This was biopsied to r/o CMV, HSV. Mild ileocolonic anastomosis Crohn's disease. Chronic active colitis with surface ulceration, cryptitis, crypt abscess and reactive changes. Negative for CMV.      Imagin24 MRCP: No evidence of biliary obstruction.Punctate cystic focus in the pancreatic tail probably communicating with the main pancreatic duct, likely a side branch IPMN.  Too small to characterize although no worrisome features.  Follow-up in 1 year is recommended.  24 US liver with doppler: Mildly dilated bile ducts in the central right hepatic lobe.  Prominence of the common bile duct is better evaluated on CT 2023.  Follow-up/further evaluation as warranted clinically.  23 CT A/P with contrast-  There is decompression of a few scattered mid small bowel loops noting distension and slow flow of the distal small bowel and associated wall hyperemia. The ileostomy appears patent there is scattered interloop fluid and venous vascular engorgement involving the distended bowel loops, no findings to suggest abscess.     2021 MRE-no active inflammation.     Problem List:  Crohn's Disease of the small and large intestines, s/p total proctocolectomy with end ileostomy (), post-op course complicated by perineal wound for ~1 year (now healed), currently off all therapies    Mild Malnutrition (Albumin 3.3)   Normocytic Anemia (stable)  GERD  History of colovesical fistula s/p operative management ()  History of abdominal abscess s/p operative management ()   Drug induced pancreatitis 2/2 Imuran   High ostomy output-improving     Hospital Course:  Output continues to be high >1.5L. We increased her imodium to max dose 4 mg 4x a day in order to try to decrease her output. We also added PRN low dose liquid lomotil to  assist with continued increased output if needed. She has not taken the PRN lomotil despite having continued high output. She continues on IVFs to assist with fluid replacement lost from ostomy output and her labs continue to be stable despite high output. On 1/19, acutely worsening abdominal pain. CTAP ordered, which did not show any signs of obstruction. IV solumedrol 60 mg x1 given on 1/19. Pain and stool output seem to be improving with I steroids.     Recommendations:  -IV solumedrol 60 mg x1 given 1/19. Now on day #3 of IV steroids (20 mg TID).   -Continue liquid imodium 4 mg QID with PRN lomotil 5ml 4 x day for continued increased output.  -Agree with IVFs for high ostomy output.  - Follow up stool studies; C diff, E.coli, stool cx negative. Fecal fat and fecal elastase normal. Awaiting GI pathogen panel, stool calprotectin.  - Will hold off on repeat endoscopy as she was just had an EGD/Ileoscopy on 1/03 and this is unlikely to  at this time.   - DVT ppx with Lovenox ordered, refused dose on 1/19 (MAR reviewed).  - IV morphine ordered for pain, okay to use given short acting nature. Would avoid escalating beyond this.  - Renvela discontinued as this may actually worsening GI sxs and is not beneficial in someone without CKD. Her phos is now normal.   - Would avoid use of NSAIDs.   - We will continue to follow.    Thank you for involving us in the care of Ivy Salgado. Please call with any additional questions, concerns or changes in the patient's clinical status.    Beronica Holguin MD  Gastroenterology

## 2024-01-21 NOTE — ASSESSMENT & PLAN NOTE
43-year-old woman with PMH GERD, Crohn's s/p ileostomy presented to GI clinic with high ileostomy output 01/16 Recent admission to [facility] (12/31-1/7) with Crohn's flare.  Admission requested by GI (Dr. Peace Garcia) for IVF, ileostomy through stoma to see if Crohn's is worse, stool studies and treatment to get her output down.  Patient reports symptoms of nausea, abdominal pain, and high ileostomy output to be similar to past Crohn's flares.   Upon transfer, HDS and AF. CBC unremarkable. CMP unremarkable. ESR 65. Mg 1.2. Phos 5.3. . CRP 1.8    Plan:  - Consulted GI/IBD Team; not recommending repeat ileosocpy  - Stool Culture with no growth to date  - C.Diff negative; E Coli Ag negative  - F/u Blood cultures, patient reporting chills and diaphoresis on 1/18. NGTD thus far.  - Continuing Solumedrol 20 mg IV TID.  - Continuous IVF - LR @ 100/hr  - Anti-diarrheals  - Daily CBC, CMP, Mg, and Phos

## 2024-01-22 LAB
ALBUMIN SERPL BCP-MCNC: 3.1 G/DL (ref 3.5–5.2)
ALP SERPL-CCNC: 116 U/L (ref 55–135)
ALT SERPL W/O P-5'-P-CCNC: 10 U/L (ref 10–44)
ANION GAP SERPL CALC-SCNC: 5 MMOL/L (ref 8–16)
AST SERPL-CCNC: 9 U/L (ref 10–40)
BASOPHILS # BLD AUTO: 0.01 K/UL (ref 0–0.2)
BASOPHILS NFR BLD: 0.1 % (ref 0–1.9)
BILIRUB SERPL-MCNC: 0.1 MG/DL (ref 0.1–1)
BUN SERPL-MCNC: 15 MG/DL (ref 6–20)
CALCIUM SERPL-MCNC: 8.9 MG/DL (ref 8.7–10.5)
CHLORIDE SERPL-SCNC: 110 MMOL/L (ref 95–110)
CO2 SERPL-SCNC: 25 MMOL/L (ref 23–29)
CREAT SERPL-MCNC: 0.6 MG/DL (ref 0.5–1.4)
DIFFERENTIAL METHOD BLD: ABNORMAL
EOSINOPHIL # BLD AUTO: 0 K/UL (ref 0–0.5)
EOSINOPHIL NFR BLD: 0 % (ref 0–8)
ERYTHROCYTE [DISTWIDTH] IN BLOOD BY AUTOMATED COUNT: 13.2 % (ref 11.5–14.5)
EST. GFR  (NO RACE VARIABLE): >60 ML/MIN/1.73 M^2
GLUCOSE SERPL-MCNC: 115 MG/DL (ref 70–110)
HCT VFR BLD AUTO: 31.8 % (ref 37–48.5)
HGB BLD-MCNC: 10.1 G/DL (ref 12–16)
IMM GRANULOCYTES # BLD AUTO: 0.04 K/UL (ref 0–0.04)
IMM GRANULOCYTES NFR BLD AUTO: 0.4 % (ref 0–0.5)
LYMPHOCYTES # BLD AUTO: 1.9 K/UL (ref 1–4.8)
LYMPHOCYTES NFR BLD: 18.2 % (ref 18–48)
MAGNESIUM SERPL-MCNC: 1.9 MG/DL (ref 1.6–2.6)
MCH RBC QN AUTO: 28.6 PG (ref 27–31)
MCHC RBC AUTO-ENTMCNC: 31.8 G/DL (ref 32–36)
MCV RBC AUTO: 90 FL (ref 82–98)
MONOCYTES # BLD AUTO: 0.7 K/UL (ref 0.3–1)
MONOCYTES NFR BLD: 6.1 % (ref 4–15)
NEUTROPHILS # BLD AUTO: 8 K/UL (ref 1.8–7.7)
NEUTROPHILS NFR BLD: 75.2 % (ref 38–73)
NRBC BLD-RTO: 0 /100 WBC
PHOSPHATE SERPL-MCNC: 2.9 MG/DL (ref 2.7–4.5)
PLATELET # BLD AUTO: 350 K/UL (ref 150–450)
PMV BLD AUTO: 10.3 FL (ref 9.2–12.9)
POTASSIUM SERPL-SCNC: 4.2 MMOL/L (ref 3.5–5.1)
PROT SERPL-MCNC: 6.8 G/DL (ref 6–8.4)
RBC # BLD AUTO: 3.53 M/UL (ref 4–5.4)
SODIUM SERPL-SCNC: 140 MMOL/L (ref 136–145)
WBC # BLD AUTO: 10.57 K/UL (ref 3.9–12.7)

## 2024-01-22 PROCEDURE — 63600175 PHARM REV CODE 636 W HCPCS

## 2024-01-22 PROCEDURE — 84100 ASSAY OF PHOSPHORUS: CPT

## 2024-01-22 PROCEDURE — 63600175 PHARM REV CODE 636 W HCPCS: Performed by: STUDENT IN AN ORGANIZED HEALTH CARE EDUCATION/TRAINING PROGRAM

## 2024-01-22 PROCEDURE — 25000003 PHARM REV CODE 250

## 2024-01-22 PROCEDURE — 11000001 HC ACUTE MED/SURG PRIVATE ROOM

## 2024-01-22 PROCEDURE — 83735 ASSAY OF MAGNESIUM: CPT

## 2024-01-22 PROCEDURE — 36415 COLL VENOUS BLD VENIPUNCTURE: CPT

## 2024-01-22 PROCEDURE — 80053 COMPREHEN METABOLIC PANEL: CPT

## 2024-01-22 PROCEDURE — 85025 COMPLETE CBC W/AUTO DIFF WBC: CPT

## 2024-01-22 PROCEDURE — 99233 SBSQ HOSP IP/OBS HIGH 50: CPT | Mod: ,,, | Performed by: INTERNAL MEDICINE

## 2024-01-22 RX ORDER — PANTOPRAZOLE SODIUM 40 MG/1
40 TABLET, DELAYED RELEASE ORAL DAILY
Status: DISCONTINUED | OUTPATIENT
Start: 2024-01-23 | End: 2024-01-27 | Stop reason: HOSPADM

## 2024-01-22 RX ADMIN — SODIUM CHLORIDE, SODIUM LACTATE, POTASSIUM CHLORIDE, AND CALCIUM CHLORIDE: 600; 310; 30; 20 INJECTION, SOLUTION INTRAVENOUS at 12:01

## 2024-01-22 RX ADMIN — THERA TABS 1 TABLET: TAB at 09:01

## 2024-01-22 RX ADMIN — CLONAZEPAM 1 MG: 0.5 TABLET ORAL at 08:01

## 2024-01-22 RX ADMIN — MORPHINE SULFATE 4 MG: 4 INJECTION INTRAVENOUS at 08:01

## 2024-01-22 RX ADMIN — METHYLPREDNISOLONE SODIUM SUCCINATE 20 MG: 40 INJECTION, POWDER, FOR SOLUTION INTRAMUSCULAR; INTRAVENOUS at 03:01

## 2024-01-22 RX ADMIN — METHYLPREDNISOLONE SODIUM SUCCINATE 20 MG: 40 INJECTION, POWDER, FOR SOLUTION INTRAMUSCULAR; INTRAVENOUS at 08:01

## 2024-01-22 RX ADMIN — SODIUM CHLORIDE, SODIUM LACTATE, POTASSIUM CHLORIDE, AND CALCIUM CHLORIDE: 600; 310; 30; 20 INJECTION, SOLUTION INTRAVENOUS at 08:01

## 2024-01-22 RX ADMIN — PROMETHAZINE HYDROCHLORIDE 25 MG: 25 TABLET ORAL at 08:01

## 2024-01-22 RX ADMIN — LOPERAMIDE HYDROCHLORIDE 4 MG: 1 SOLUTION ORAL at 06:01

## 2024-01-22 RX ADMIN — LOPERAMIDE HYDROCHLORIDE 4 MG: 1 SOLUTION ORAL at 09:01

## 2024-01-22 RX ADMIN — CLONAZEPAM 1 MG: 0.5 TABLET ORAL at 09:01

## 2024-01-22 RX ADMIN — LOPERAMIDE HYDROCHLORIDE 4 MG: 1 SOLUTION ORAL at 01:01

## 2024-01-22 RX ADMIN — GABAPENTIN 300 MG: 300 CAPSULE ORAL at 08:01

## 2024-01-22 RX ADMIN — MORPHINE SULFATE 2 MG: 2 INJECTION, SOLUTION INTRAMUSCULAR; INTRAVENOUS at 01:01

## 2024-01-22 RX ADMIN — LOPERAMIDE HYDROCHLORIDE 4 MG: 1 SOLUTION ORAL at 08:01

## 2024-01-22 RX ADMIN — ENOXAPARIN SODIUM 40 MG: 40 INJECTION SUBCUTANEOUS at 06:01

## 2024-01-22 RX ADMIN — PROMETHAZINE HYDROCHLORIDE 25 MG: 25 TABLET ORAL at 09:01

## 2024-01-22 RX ADMIN — MORPHINE SULFATE 4 MG: 4 INJECTION INTRAVENOUS at 03:01

## 2024-01-22 RX ADMIN — DRONABINOL 5 MG: 2.5 CAPSULE ORAL at 03:01

## 2024-01-22 RX ADMIN — SODIUM CHLORIDE, SODIUM LACTATE, POTASSIUM CHLORIDE, AND CALCIUM CHLORIDE: 600; 310; 30; 20 INJECTION, SOLUTION INTRAVENOUS at 01:01

## 2024-01-22 RX ADMIN — GABAPENTIN 300 MG: 300 CAPSULE ORAL at 09:01

## 2024-01-22 RX ADMIN — MORPHINE SULFATE 4 MG: 4 INJECTION INTRAVENOUS at 05:01

## 2024-01-22 RX ADMIN — PANTOPRAZOLE SODIUM 40 MG: 40 TABLET, DELAYED RELEASE ORAL at 09:01

## 2024-01-22 RX ADMIN — MIRTAZAPINE 30 MG: 15 TABLET, ORALLY DISINTEGRATING ORAL at 08:01

## 2024-01-22 RX ADMIN — Medication 6 MG: at 08:01

## 2024-01-22 RX ADMIN — MORPHINE SULFATE 4 MG: 4 INJECTION INTRAVENOUS at 09:01

## 2024-01-22 RX ADMIN — MORPHINE SULFATE 2 MG: 2 INJECTION, SOLUTION INTRAMUSCULAR; INTRAVENOUS at 12:01

## 2024-01-22 RX ADMIN — NADOLOL 40 MG: 20 TABLET ORAL at 09:01

## 2024-01-22 RX ADMIN — METHYLPREDNISOLONE SODIUM SUCCINATE 20 MG: 40 INJECTION, POWDER, FOR SOLUTION INTRAMUSCULAR; INTRAVENOUS at 09:01

## 2024-01-22 NOTE — ASSESSMENT & PLAN NOTE
43-year-old woman with PMH GERD, Crohn's s/p ileostomy presented to GI clinic with high ileostomy output 01/16 Recent admission to [facility] (12/31-1/7) with Crohn's flare.  Admission requested by GI (Dr. Peace Garcia) for IVF, ileostomy through stoma to see if Crohn's is worse, stool studies and treatment to get her output down.  Patient reports symptoms of nausea, abdominal pain, and high ileostomy output to be similar to past Crohn's flares.   Upon transfer, HDS and AF. CBC unremarkable. CMP unremarkable. ESR 65. Mg 1.2. Phos 5.3. . CRP 1.8    Plan:  - Consulted GI/IBD Team; not recommending repeat ileosocpy  - Stool Culture with no growth to date  - C.Diff negative; E Coli Ag negative  - F/u Blood cultures, patient reporting chills and diaphoresis on 1/18. NGTD thus far.  - Continuing Solumedrol 20 mg IV TID.  - Continuous IVF - LR @ 100/hr  - Anti-diarrheals  - Daily CBC, CMP, Mg, and Phos

## 2024-01-22 NOTE — PLAN OF CARE
Problem: Pain Acute  Goal: Acceptable Pain Control and Functional Ability  Outcome: Ongoing, Not Progressing     Problem: Fall Injury Risk  Goal: Absence of Fall and Fall-Related Injury  Outcome: Ongoing, Not Progressing     Problem: Adult Inpatient Plan of Care  Goal: Absence of Hospital-Acquired Illness or Injury  Outcome: Ongoing, Not Progressing  Goal: Optimal Comfort and Wellbeing  Outcome: Ongoing, Not Progressing  Goal: Readiness for Transition of Care  Outcome: Ongoing, Not Progressing     Problem: Infection  Goal: Absence of Infection Signs and Symptoms  Outcome: Ongoing, Not Progressing

## 2024-01-22 NOTE — PROGRESS NOTES
[facility] Medicine  Progress Note    Patient Name: Ivy Salgado  MRN: [id number]  Patient Class: IP- Inpatient   Admission Date: 1/16/2024  Length of Stay: 6 days  Attending Physician: Philippe Lee, *  Primary Care Provider: Luis Madden DO        Subjective:     Principal Problem:Crohn's disease of both small and large intestine        HPI:  Patient is a 44 yo female with PMH of Crohn's disease s/p ileostomy, GERD, and ARPITA who presented from GI clinic at the request for admission from Dr. Peace Garcia for ongoing high ileostomy output. Of note, she was recently hospitalized 12/31-01/07 for similar presentation. She is reporting generalized 8/10 abdominal pain, nausea, high ileostomy output, decreased appetite,and heartburn. She states her symptoms are similar the symptoms she used to have with Crohn's flares in the past, but she reports she hasn't had a flare since 2020. She reports normally emptying her ileostomy bag 2-3 times after meals, but in the last few weeks, she has had to continuously empty the bag as high as 12 times even when not eating meals. Her appetite has been decreased, reporting only eating half of her normal intake. During her recent admission, she had a formal workup with EGD/ileoscopy and stool studies that were largely unrevealing aside from multiple ulcers in the prepyloric region. She denies fever, chills, chest pain, SOB, palpitations, headache, vision changes, skin changes, vomiting, urinary symptoms, blood in stool, weight loss, or weakness.     She arrived to [facility] as a direct admission with plans to be started on IVF, anti-diarrheals, pain medications, and plans for stool studies and repeat ileoscopy. She was admitted to hospital medicine for symptomatic care and management of high ileostomy output and potential Crohn's flare with consult to IBD team.    Overview/Hospital Course:  Patient was admitted as a direct admit from GI clinic due to high  ileostomy output. She was treated with continuous IVF, anti-diarrheals, and pain medications as needed. GI/IBD were consulted, recommended continued supportive treatment for time being. No plans for repeat ileoscopy as she recently completed one during last admission, biopsy from which showed active enteritis with ulceration. At this time CDiff studies negative, E. Coli Ag negative, and Stool culture with no growth. Patient was started on Solumedrol for presumed Crohn's flare. On 01/19, patient developed acute worsening abdominal pain, and a CTAP with IV contrast was ordered, which was negative for any acute abnormality.     Interval History: NAEON. AFVSS. Patient states she doesn't feel great this morning with ongoing 8/10 right sided abdominal pain. The pain is identical to the pain she has been having for the past few days, but it's mildly worse than yesterday. She also reports that her ileostomy output seems to be increasing as compared to yesterday, but denies any change to appetite or food intake. Continuing IVF, pain meds, anti-diarrheals, and IV Solumedrol.    Review of Systems  Objective:     Vital Signs (Most Recent):  Temp: 99.5 °F (37.5 °C) (01/22/24 1111)  Pulse: 64 (01/22/24 1111)  Resp: 18 (01/22/24 1207)  BP: 114/74 (01/22/24 1111)  SpO2: 96 % (01/22/24 1111) Vital Signs (24h Range):  Temp:  [98.1 °F (36.7 °C)-99.5 °F (37.5 °C)] 99.5 °F (37.5 °C)  Pulse:  [64-79] 64  Resp:  [14-18] 18  SpO2:  [95 %-97 %] 96 %  BP: (107-181)/(65-80) 114/74     Weight: 51.3 kg (113 lb 1.5 oz)  Body mass index is 21.37 kg/m².  No intake or output data in the 24 hours ending 01/22/24 1315      Physical Exam  Vitals and nursing note reviewed.   Constitutional:       General: She is not in acute distress.     Appearance: Normal appearance. She is normal weight. She is not ill-appearing.   HENT:      Head: Normocephalic and atraumatic.      Mouth/Throat:      Mouth: Mucous membranes are moist.      Pharynx: Oropharynx is  clear.   Eyes:      General: No scleral icterus.     Extraocular Movements: Extraocular movements intact.      Conjunctiva/sclera: Conjunctivae normal.      Pupils: Pupils are equal, round, and reactive to light.   Cardiovascular:      Rate and Rhythm: Normal rate and regular rhythm.      Pulses: Normal pulses.      Heart sounds: Normal heart sounds.   Pulmonary:      Effort: Pulmonary effort is normal. No respiratory distress.      Breath sounds: Normal breath sounds. No wheezing.   Abdominal:      General: Bowel sounds are normal. There is no distension.      Palpations: Abdomen is soft.      Tenderness: There is abdominal tenderness. There is no guarding or rebound.      Comments: Ileostomy bag in place, no erythema or signs of leakage. Site is clean, dry and intact   Musculoskeletal:         General: No tenderness.      Right lower leg: No edema.      Left lower leg: No edema.   Skin:     General: Skin is warm and dry.      Coloration: Skin is not pale.      Findings: No erythema or rash.   Neurological:      General: No focal deficit present.      Mental Status: She is alert and oriented to person, place, and time. Mental status is at baseline.      Cranial Nerves: No cranial nerve deficit.   Psychiatric:         Mood and Affect: Mood normal.         Behavior: Behavior normal.             Significant Labs: All pertinent labs within the past 24 hours have been reviewed.    Significant Imaging: I have reviewed all pertinent imaging results/findings within the past 24 hours.    Assessment/Plan:      * Crohn's disease of both small and large intestine  43-year-old woman with PMH GERD, Crohn's s/p ileostomy presented to GI clinic with high ileostomy output 01/16 Recent admission to [facility] [place] (12/31-1/7) with Crohn's flare.  Admission requested by GI (Dr. Peace Garcia) for IVF, ileostomy through stoma to see if Crohn's is worse, stool studies and treatment to get her output down.  Patient reports symptoms of nausea,  abdominal pain, and high ileostomy output to be similar to past Crohn's flares.   Upon transfer, HDS and AF. CBC unremarkable. CMP unremarkable. ESR 65. Mg 1.2. Phos 5.3. . CRP 1.8    Plan:  - Consulted GI/IBD Team; not recommending repeat ileosocpy  - Stool Culture with no growth to date  - C.Diff negative; E Coli Ag negative  - F/u Blood cultures, patient reporting chills and diaphoresis on 1/18. NGTD thus far.  - Continuing Solumedrol 20 mg IV TID.  - Continuous IVF - LR @ 100/hr  - Anti-diarrheals  - Daily CBC, CMP, Mg, and Phos    Gastroesophageal reflux disease  - Protonix 40 mg BID      Nausea  - Phenergan 25mg q6h PRN first choice  - Compazine 2.5mg q6h PRN second choice    High output ileostomy  Patient reports normally emptying her ileostomy 2-3x following each meal at baseline; on admission, she reports having to empty her 12-15x per day.    - Anti-diarrheals scheduled  - Continuous IVF  - Replete electrolytes as needed  - Continue regular diet    Abdominal pain  - Morphine 4mg IV q4h prn severe pain  - Morphine 2mg IV q4h breakthrough pain   - CTAP due to acute worsening pain (01/19); negative for acute process, however was done without oral contrast. Consider repeating CTAP with oral contrast    Generalized anxiety disorder    - Restarted home mirtazapine.     S/P ileostomy  Ileostomy placed as a result of total proctocolectomy due to fistulizing Crohn's disease.        VTE Risk Mitigation (From admission, onward)           Ordered     enoxaparin injection 40 mg  Every 24 hours         01/16/24 1427     Reason for No Pharmacological VTE Prophylaxis  Once        Question:  Reasons:  Answer:  Risk of Bleeding    01/16/24 1335     IP VTE HIGH RISK PATIENT  Once         01/16/24 1335     Place sequential compression device  Until discontinued         01/16/24 1335                    Discharge Planning   CHRISTOPH: 1/23/2024     Code Status: Full Code   Is the patient medically ready for discharge?: No     Reason for patient still in hospital (select all that apply): Patient trending condition  Discharge Plan A: Home with family   Discharge Delays: None known at this time              Lb Meadows DO  Department of Hospital Medicine   [facility] Surg

## 2024-01-22 NOTE — SUBJECTIVE & OBJECTIVE
Interval History: NAEON. AFVSS. Patient states she doesn't feel great this morning with ongoing 8/10 right sided abdominal pain. The pain is identical to the pain she has been having for the past few days, but it's mildly worse than yesterday. She also reports that her ileostomy output seems to be increasing as compared to yesterday, but denies any change to appetite or food intake. Continuing IVF, pain meds, anti-diarrheals, and IV Solumedrol.    Review of Systems  Objective:     Vital Signs (Most Recent):  Temp: 99.5 °F (37.5 °C) (01/22/24 1111)  Pulse: 64 (01/22/24 1111)  Resp: 18 (01/22/24 1207)  BP: 114/74 (01/22/24 1111)  SpO2: 96 % (01/22/24 1111) Vital Signs (24h Range):  Temp:  [98.1 °F (36.7 °C)-99.5 °F (37.5 °C)] 99.5 °F (37.5 °C)  Pulse:  [64-79] 64  Resp:  [14-18] 18  SpO2:  [95 %-97 %] 96 %  BP: (107-181)/(65-80) 114/74     Weight: 51.3 kg (113 lb 1.5 oz)  Body mass index is 21.37 kg/m².  No intake or output data in the 24 hours ending 01/22/24 1315      Physical Exam  Vitals and nursing note reviewed.   Constitutional:       General: She is not in acute distress.     Appearance: Normal appearance. She is normal weight. She is not ill-appearing.   HENT:      Head: Normocephalic and atraumatic.      Mouth/Throat:      Mouth: Mucous membranes are moist.      Pharynx: Oropharynx is clear.   Eyes:      General: No scleral icterus.     Extraocular Movements: Extraocular movements intact.      Conjunctiva/sclera: Conjunctivae normal.      Pupils: Pupils are equal, round, and reactive to light.   Cardiovascular:      Rate and Rhythm: Normal rate and regular rhythm.      Pulses: Normal pulses.      Heart sounds: Normal heart sounds.   Pulmonary:      Effort: Pulmonary effort is normal. No respiratory distress.      Breath sounds: Normal breath sounds. No wheezing.   Abdominal:      General: Bowel sounds are normal. There is no distension.      Palpations: Abdomen is soft.      Tenderness: There is abdominal  tenderness. There is no guarding or rebound.      Comments: Ileostomy bag in place, no erythema or signs of leakage. Site is clean, dry and intact   Musculoskeletal:         General: No tenderness.      Right lower leg: No edema.      Left lower leg: No edema.   Skin:     General: Skin is warm and dry.      Coloration: Skin is not pale.      Findings: No erythema or rash.   Neurological:      General: No focal deficit present.      Mental Status: She is alert and oriented to person, place, and time. Mental status is at baseline.      Cranial Nerves: No cranial nerve deficit.   Psychiatric:         Mood and Affect: Mood normal.         Behavior: Behavior normal.             Significant Labs: All pertinent labs within the past 24 hours have been reviewed.    Significant Imaging: I have reviewed all pertinent imaging results/findings within the past 24 hours.

## 2024-01-23 LAB
ALBUMIN SERPL BCP-MCNC: 3.2 G/DL (ref 3.5–5.2)
ALP SERPL-CCNC: 114 U/L (ref 55–135)
ALT SERPL W/O P-5'-P-CCNC: 10 U/L (ref 10–44)
ANION GAP SERPL CALC-SCNC: 9 MMOL/L (ref 8–16)
AST SERPL-CCNC: 9 U/L (ref 10–40)
BACTERIA BLD CULT: NORMAL
BACTERIA BLD CULT: NORMAL
BASOPHILS # BLD AUTO: 0.01 K/UL (ref 0–0.2)
BASOPHILS NFR BLD: 0.1 % (ref 0–1.9)
BILIRUB SERPL-MCNC: 0.2 MG/DL (ref 0.1–1)
BUN SERPL-MCNC: 17 MG/DL (ref 6–20)
CALCIUM SERPL-MCNC: 9 MG/DL (ref 8.7–10.5)
CALPROTECTIN STL-MCNT: 93.9 MCG/G
CAMPY SP DNA.DIARRHEA STL QL NAA+PROBE: NOT DETECTED
CHLORIDE SERPL-SCNC: 109 MMOL/L (ref 95–110)
CO2 SERPL-SCNC: 23 MMOL/L (ref 23–29)
CREAT SERPL-MCNC: 0.8 MG/DL (ref 0.5–1.4)
CRYPTOSP DNA SPEC QL NAA+PROBE: NOT DETECTED
DIFFERENTIAL METHOD BLD: ABNORMAL
E COLI O157H7 DNA SPEC QL NAA+PROBE: NOT DETECTED
E HISTOLYT DNA SPEC QL NAA+PROBE: NOT DETECTED
EOSINOPHIL # BLD AUTO: 0 K/UL (ref 0–0.5)
EOSINOPHIL NFR BLD: 0 % (ref 0–8)
ERYTHROCYTE [DISTWIDTH] IN BLOOD BY AUTOMATED COUNT: 12.9 % (ref 11.5–14.5)
EST. GFR  (NO RACE VARIABLE): >60 ML/MIN/1.73 M^2
G LAMBLIA DNA SPEC QL NAA+PROBE: NOT DETECTED
GLUCOSE SERPL-MCNC: 135 MG/DL (ref 70–110)
GPP - ADENOVIRUS 40/41: NOT DETECTED
GPP - ENTEROTOXIGENIC E COLI (ETEC): NOT DETECTED
GPP - SHIGELLA: NOT DETECTED
HCT VFR BLD AUTO: 33 % (ref 37–48.5)
HGB BLD-MCNC: 10.4 G/DL (ref 12–16)
IMM GRANULOCYTES # BLD AUTO: 0.07 K/UL (ref 0–0.04)
IMM GRANULOCYTES NFR BLD AUTO: 0.6 % (ref 0–0.5)
LACTATE PLASV-SCNC: NOT DETECTED MMOL/L
LYMPHOCYTES # BLD AUTO: 1.8 K/UL (ref 1–4.8)
LYMPHOCYTES NFR BLD: 16.1 % (ref 18–48)
MAGNESIUM SERPL-MCNC: 2 MG/DL (ref 1.6–2.6)
MCH RBC QN AUTO: 28.7 PG (ref 27–31)
MCHC RBC AUTO-ENTMCNC: 31.5 G/DL (ref 32–36)
MCV RBC AUTO: 91 FL (ref 82–98)
MONOCYTES # BLD AUTO: 0.4 K/UL (ref 0.3–1)
MONOCYTES NFR BLD: 3.5 % (ref 4–15)
NEUTROPHILS # BLD AUTO: 8.8 K/UL (ref 1.8–7.7)
NEUTROPHILS NFR BLD: 79.7 % (ref 38–73)
NOROVIRUS RNA STL QL NAA+PROBE: NOT DETECTED
NRBC BLD-RTO: 0 /100 WBC
PHOSPHATE SERPL-MCNC: 3.8 MG/DL (ref 2.7–4.5)
PLATELET # BLD AUTO: 398 K/UL (ref 150–450)
PMV BLD AUTO: 9.9 FL (ref 9.2–12.9)
POTASSIUM SERPL-SCNC: 3.9 MMOL/L (ref 3.5–5.1)
PROT SERPL-MCNC: 7.1 G/DL (ref 6–8.4)
RBC # BLD AUTO: 3.63 M/UL (ref 4–5.4)
RV DSRNA STL QL NAA+PROBE: NOT DETECTED
SALMONELLA DNA SPEC QL NAA+PROBE: NOT DETECTED
SODIUM SERPL-SCNC: 141 MMOL/L (ref 136–145)
V CHOLERAE DNA SPEC QL NAA+PROBE: NOT DETECTED
WBC # BLD AUTO: 11.04 K/UL (ref 3.9–12.7)
Y ENTERO RECN STL QL NAA+PROBE: NOT DETECTED

## 2024-01-23 PROCEDURE — 83735 ASSAY OF MAGNESIUM: CPT

## 2024-01-23 PROCEDURE — 63600175 PHARM REV CODE 636 W HCPCS

## 2024-01-23 PROCEDURE — 85025 COMPLETE CBC W/AUTO DIFF WBC: CPT

## 2024-01-23 PROCEDURE — 36415 COLL VENOUS BLD VENIPUNCTURE: CPT | Performed by: STUDENT IN AN ORGANIZED HEALTH CARE EDUCATION/TRAINING PROGRAM

## 2024-01-23 PROCEDURE — 84100 ASSAY OF PHOSPHORUS: CPT

## 2024-01-23 PROCEDURE — 11000001 HC ACUTE MED/SURG PRIVATE ROOM

## 2024-01-23 PROCEDURE — 25000003 PHARM REV CODE 250

## 2024-01-23 PROCEDURE — 83497 ASSAY OF 5-HIAA: CPT | Performed by: STUDENT IN AN ORGANIZED HEALTH CARE EDUCATION/TRAINING PROGRAM

## 2024-01-23 PROCEDURE — 84586 ASSAY OF VIP: CPT | Performed by: STUDENT IN AN ORGANIZED HEALTH CARE EDUCATION/TRAINING PROGRAM

## 2024-01-23 PROCEDURE — 63600175 PHARM REV CODE 636 W HCPCS: Performed by: STUDENT IN AN ORGANIZED HEALTH CARE EDUCATION/TRAINING PROGRAM

## 2024-01-23 PROCEDURE — 82941 ASSAY OF GASTRIN: CPT | Performed by: STUDENT IN AN ORGANIZED HEALTH CARE EDUCATION/TRAINING PROGRAM

## 2024-01-23 PROCEDURE — 99233 SBSQ HOSP IP/OBS HIGH 50: CPT | Mod: FS,,,

## 2024-01-23 PROCEDURE — 80053 COMPREHEN METABOLIC PANEL: CPT

## 2024-01-23 RX ADMIN — SODIUM CHLORIDE, SODIUM LACTATE, POTASSIUM CHLORIDE, AND CALCIUM CHLORIDE: 600; 310; 30; 20 INJECTION, SOLUTION INTRAVENOUS at 04:01

## 2024-01-23 RX ADMIN — CLONAZEPAM 1 MG: 0.5 TABLET ORAL at 10:01

## 2024-01-23 RX ADMIN — LOPERAMIDE HYDROCHLORIDE 4 MG: 1 SOLUTION ORAL at 08:01

## 2024-01-23 RX ADMIN — ENOXAPARIN SODIUM 40 MG: 40 INJECTION SUBCUTANEOUS at 04:01

## 2024-01-23 RX ADMIN — SODIUM CHLORIDE, SODIUM LACTATE, POTASSIUM CHLORIDE, AND CALCIUM CHLORIDE: 600; 310; 30; 20 INJECTION, SOLUTION INTRAVENOUS at 06:01

## 2024-01-23 RX ADMIN — PROMETHAZINE HYDROCHLORIDE 25 MG: 25 TABLET ORAL at 06:01

## 2024-01-23 RX ADMIN — LOPERAMIDE HYDROCHLORIDE 4 MG: 1 SOLUTION ORAL at 06:01

## 2024-01-23 RX ADMIN — THERA TABS 1 TABLET: TAB at 08:01

## 2024-01-23 RX ADMIN — DRONABINOL 5 MG: 2.5 CAPSULE ORAL at 04:01

## 2024-01-23 RX ADMIN — MIRTAZAPINE 30 MG: 15 TABLET, ORALLY DISINTEGRATING ORAL at 10:01

## 2024-01-23 RX ADMIN — MORPHINE SULFATE 4 MG: 4 INJECTION INTRAVENOUS at 08:01

## 2024-01-23 RX ADMIN — CLONAZEPAM 1 MG: 0.5 TABLET ORAL at 08:01

## 2024-01-23 RX ADMIN — DRONABINOL 5 MG: 2.5 CAPSULE ORAL at 05:01

## 2024-01-23 RX ADMIN — MORPHINE SULFATE 4 MG: 4 INJECTION INTRAVENOUS at 10:01

## 2024-01-23 RX ADMIN — MORPHINE SULFATE 4 MG: 4 INJECTION INTRAVENOUS at 05:01

## 2024-01-23 RX ADMIN — GABAPENTIN 300 MG: 300 CAPSULE ORAL at 08:01

## 2024-01-23 RX ADMIN — GABAPENTIN 300 MG: 300 CAPSULE ORAL at 10:01

## 2024-01-23 RX ADMIN — PROMETHAZINE HYDROCHLORIDE 25 MG: 25 TABLET ORAL at 02:01

## 2024-01-23 RX ADMIN — METHYLPREDNISOLONE SODIUM SUCCINATE 20 MG: 40 INJECTION, POWDER, FOR SOLUTION INTRAMUSCULAR; INTRAVENOUS at 10:01

## 2024-01-23 RX ADMIN — METHYLPREDNISOLONE SODIUM SUCCINATE 20 MG: 40 INJECTION, POWDER, FOR SOLUTION INTRAMUSCULAR; INTRAVENOUS at 05:01

## 2024-01-23 RX ADMIN — MORPHINE SULFATE 4 MG: 4 INJECTION INTRAVENOUS at 04:01

## 2024-01-23 RX ADMIN — NADOLOL 40 MG: 20 TABLET ORAL at 08:01

## 2024-01-23 RX ADMIN — MORPHINE SULFATE 2 MG: 2 INJECTION, SOLUTION INTRAMUSCULAR; INTRAVENOUS at 12:01

## 2024-01-23 RX ADMIN — MORPHINE SULFATE 2 MG: 2 INJECTION, SOLUTION INTRAMUSCULAR; INTRAVENOUS at 06:01

## 2024-01-23 RX ADMIN — LOPERAMIDE HYDROCHLORIDE 4 MG: 1 SOLUTION ORAL at 12:01

## 2024-01-23 RX ADMIN — METHYLPREDNISOLONE SODIUM SUCCINATE 20 MG: 40 INJECTION, POWDER, FOR SOLUTION INTRAMUSCULAR; INTRAVENOUS at 08:01

## 2024-01-23 RX ADMIN — MORPHINE SULFATE 4 MG: 4 INJECTION INTRAVENOUS at 01:01

## 2024-01-23 RX ADMIN — PANTOPRAZOLE SODIUM 40 MG: 40 TABLET, DELAYED RELEASE ORAL at 08:01

## 2024-01-23 NOTE — PROGRESS NOTES
[facility]  Gastroenterology  Progress Note    Patient Name: Ivy Salgado  MRN: [id number]  Admission Date: 1/16/2024  Hospital Length of Stay: 7 days  Code Status: Full Code   Attending Provider: Philippe Lee, *  Consulting Provider: Beronica Holguin MD  Primary Care Physician: Luis Madden DO  Principal Problem: Crohn's disease of both small and large intestine        Subjective:     Interval History:   Stool output of 690 overnight, decreased from initial admission but a trend up over the last day. She is now on Day #4 of IV steroids.    Review of Systems   Constitutional:  Negative for chills and fever.   Gastrointestinal:  Positive for abdominal distention, abdominal pain and diarrhea. Negative for blood in stool, nausea and vomiting.     Objective:     Vital Signs (Most Recent):  Temp: 98.6 °F (37 °C) (01/23/24 0452)  Pulse: (!) 59 (01/23/24 0452)  Resp: 16 (01/23/24 0619)  BP: 133/86 (01/23/24 0452)  SpO2: 98 % (01/23/24 0452) Vital Signs (24h Range):  Temp:  [97.8 °F (36.6 °C)-99.5 °F (37.5 °C)] 98.6 °F (37 °C)  Pulse:  [59-69] 59  Resp:  [14-18] 16  SpO2:  [95 %-98 %] 98 %  BP: (111-181)/(69-86) 133/86     Weight: 51.3 kg (113 lb 1.5 oz) (01/18/24 0545)  Body mass index is 21.37 kg/m².    No intake or output data in the 24 hours ending 01/23/24 0620    Lines/Drains/Airways       Central Venous Catheter Line  Duration                  PowerPort A Cath Single Lumen Atrial Left -- days              Drain  Duration                  Ileostomy 06/16/12 0000 RLQ 4238 days                     Physical Exam  Vitals reviewed.   Constitutional:       General: She is not in acute distress.     Appearance: She is ill-appearing.   HENT:      Mouth/Throat:      Mouth: Mucous membranes are moist.   Eyes:      Extraocular Movements: Extraocular movements intact.   Pulmonary:      Effort: Pulmonary effort is normal.   Abdominal:      General: There is no distension.      Palpations: Abdomen is  soft.      Tenderness: There is abdominal tenderness (Right sided). There is no guarding or rebound.   Neurological:      Mental Status: She is alert. Mental status is at baseline.          Significant Labs:  All pertinent lab results from the last 24 hours have been reviewed.      Significant Imaging:  Imaging results within the past 24 hours have been reviewed.  Assessment/Plan:     GI  * Crohn's disease of both small and large intestine  Crohn's disease of both small and large intestine  Ivy Salgado is a 43 year old female for whom GI is consulted for IBD management. 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 GI/IBD consulted for further management of high ostomy output.     Endoscopies:   01/2024- EGD- mild gastritis  01/2024-Ileoscopy- 6 ulcers in the distal ileum.  8/2017- Ileoscopy- The examined portion of the ileum was normal.   Biopsied. Widely patent end ileostomy with healthy appearing mucosa at the ileostomy. Pathology showed fragments of unremarkable small bowel mucosa. No evidence of active colitis, granuloma, dysplasia or malignancy  10/2016- EGD- Normal esophagus. Small hiatus hernia. Erythematous mucosa in the gastric body. Biopsied. Trace of hematin in the gastric body. Normal examined duodenum.  Two biopsies were obtained in the duodenal bulb. Four biopsies were obtained in the 2nd part of the duodenum.   10/2016- Ileoscopy- The examined portion of the ileum was normal. Biopsies without any acute abnormalities- CMV, HSV-1  AND HSV-2 stains performed. No celiac. No. H pylori. Normal mucosa of the small bowel.   10/2014- EGD- Normal Study. Biopsied to r/o Crohn's.    10/2014- Ileoscopy- The examined portion of the ileum was normal. Biopsies normal.  2014- SBE- A single (solitary) ulcer in the proximal ileum. Biopsied. Mild non-specific enteritis.   2013- Ileoscopy- Congested, eroded and ulcerated mucosa in the area at 5 cm proximal to the stoma. Biopsied. The examined portion of the ileum was normal otherwise up to 30 cm. Biopsied. FRAGMENTS OF NONNEOPLASTIC SMALL INTESTINAL MUCOSA WITH NO ACTIVE INFLAMMATION, ULCERATION OR DYSPLASIA PRESENT. THERE IS PRESERVATION OF THE VILLOUS ARCHITECTURE. FRAGMENTS OF NONNEOPLASTIC SMALL INTESTINAL MUCOSA WITH NO ACTIVE INFLAMMATION, ULCERATION OR DYSPLASIA IDENTIFIED.  2013- EGD- Normal esophagus. Z-line regular, 36 cm from the incisors. A single gastric polyp. Resected and retrieved. A small amount of food (residue) in the stomach. Removal was successful. Mucosal abnormality in the duodenum. Biopsied. Mild chronic gastritis but otherwise negative.   2012- EGD- Normal Study.   2012- Colonoscopy- Crohn's colitis mostly involving the distal 30 cm. This was biopsied to r/o CMV, HSV. Mild ileocolonic anastomosis Crohn's disease. Chronic active colitis with surface ulceration, cryptitis, crypt abscess and reactive changes. Negative for CMV.      Imagin24 MRCP: No evidence of biliary obstruction.Punctate cystic focus in the pancreatic tail probably communicating with the main pancreatic duct, likely a side branch IPMN.  Too small to characterize although no worrisome features.  Follow-up in 1 year is recommended.  24 US liver with doppler: Mildly dilated bile ducts in the central right hepatic lobe.  Prominence of the common bile duct is better evaluated on CT 2023.  Follow-up/further evaluation as warranted clinically.  23 CT A/P with contrast-  There is decompression  of a few scattered mid small bowel loops noting distension and slow flow of the distal small bowel and associated wall hyperemia. The ileostomy appears patent there is scattered interloop fluid and venous vascular engorgement involving the distended bowel loops, no findings to suggest abscess.     4/16/2021 MRE-no active inflammation.     Problem List:  Crohn's Disease of the small and large intestines, s/p total proctocolectomy with end ileostomy (2012), post-op course complicated by perineal wound for ~1 year (now healed), currently off all therapies    Mild Malnutrition (Albumin 3.3)   Normocytic Anemia (stable)  GERD  History of colovesical fistula s/p operative management (1992)  History of abdominal abscess s/p operative management (1998)   Drug induced pancreatitis 2/2 Imuran   High ostomy output-improving     Hospital Course:  Output continues to be high >1.5L. We increased her imodium to max dose 4 mg 4x a day in order to try to decrease her output. We also added PRN low dose liquid lomotil to assist with continued increased output if needed. She has not taken the PRN lomotil despite having continued high output. She continues on IVFs to assist with fluid replacement lost from ostomy output and her labs continue to be stable despite high output. On 1/19, acutely worsening abdominal pain. CTAP ordered, which did not show any signs of obstruction. IV solumedrol 60 mg x1 given on 1/19. Pain and stool output seem to be improving with IV steroids but overnight on 1/22, she had an increase in her ostomy output to 690cc.     Recommendations:  - IV solumedrol 60 mg x1 given 1/19. Now on day #4 of IV steroids (20 mg TID). Continue IV steroids for now.  - Continue liquid imodium 4 mg QID with PRN lomotil 5ml 4 x day for continued increased output.  - will add additional test for workup for diarrhea to be sure- VIP, calcitonin, chromogranin A, gastrin, somatostatin    - If ostomy output continues to increase may add  alosetron (lotronex 1 mg po BID)- though not on formulary so would need to prescribe to outpatient pharmacy and see if pt can take inpatient with outpatient prescription. Other options include octreotide, psyllium  - Agree with IVFs for high ostomy output.  - Follow up stool studies; C diff, E.coli, stool cx negative. Fecal fat and fecal elastase normal. Awaiting GI pathogen panel, stool calprotectin.  - Will hold off on repeat endoscopy as she was just had an EGD/Ileoscopy on 1/03 and this is unlikely to  at this time.   - DVT ppx with Lovenox ordered, refused dose on 1/19 (MAR reviewed).  - IV morphine ordered for pain, okay to use given short acting nature. Would avoid escalating beyond this.  - Renvela discontinued as this may actually worsening GI sxs and is not beneficial in someone without CKD. Her phos is now normal.   - Would avoid use of NSAIDs.   - We will continue to follow.    Thank you for your consult. I will follow-up with patient. Please contact us if you have any additional questions.    Beronica Holguin MD  Gastroenterology  [facility] - Med Surg

## 2024-01-23 NOTE — PLAN OF CARE
[facility]  Discharge Reassessment    Primary Care Provider: Luis Madden DO    Expected Discharge Date: 1/24/2024    Reassessment (most recent)       Discharge Reassessment - 01/23/24 0945          Discharge Reassessment    Assessment Type Discharge Planning Reassessment     Did the patient's condition or plan change since previous assessment? No     Discharge Plan discussed with: Patient     Communicated CHRISTOPH with patient/caregiver Yes     Discharge Plan A Home with family     Discharge Plan B Home     DME Needed Upon Discharge  none     Transition of Care Barriers None     Why the patient remains in the hospital Requires continued medical care        Post-Acute Status    Post-Acute Authorization Other     Other Status No Post-Acute Service Needs     Hospital Resources/Appts/Education Provided Appointments scheduled and added to AVS     Discharge Delays None known at this time

## 2024-01-23 NOTE — PLAN OF CARE
01/23/24 0944   Post-Acute Status   Post-Acute Authorization Other   Other Status No Post-Acute Service Needs   Hospital Resources/Appts/Education Provided Appointments scheduled and added to AVS   Discharge Delays None known at this time   Discharge Plan   Discharge Plan A Home with family   Discharge Plan B Home

## 2024-01-23 NOTE — ASSESSMENT & PLAN NOTE
Crohn's disease of both small and large intestine  Ivy Salgado is a 43 year old female for whom GI is consulted for IBD management. 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 GI/IBD consulted for further management of high ostomy output.     Endoscopies:   01/2024- EGD- mild gastritis  01/2024-Ileoscopy- 6 ulcers in the distal ileum.  8/2017- Ileoscopy- The examined portion of the ileum was normal.   Biopsied. Widely patent end ileostomy with healthy appearing mucosa at the ileostomy. Pathology showed fragments of unremarkable small bowel mucosa. No evidence of active colitis, granuloma, dysplasia or malignancy  10/2016- EGD- Normal esophagus. Small hiatus hernia. Erythematous mucosa in the gastric body. Biopsied. Trace of hematin in the gastric body. Normal examined duodenum.  Two biopsies were obtained in the duodenal bulb. Four biopsies were obtained in the 2nd part of the duodenum.   10/2016- Ileoscopy- The examined portion of the ileum was normal. Biopsies without any acute abnormalities- CMV, HSV-1 AND HSV-2 stains performed. No celiac. No. H pylori. Normal mucosa of the small bowel.   10/2014- EGD- Normal Study. Biopsied to r/o Crohn's.    10/2014- Ileoscopy- The examined portion of the ileum was normal. Biopsies normal.  2/2014- SBE- A single (solitary) ulcer in the proximal ileum. Biopsied. Mild non-specific enteritis.   9/2013- Ileoscopy- Congested, eroded and ulcerated mucosa in the area at 5 cm proximal to the stoma. Biopsied. The  examined portion of the ileum was normal otherwise up to 30 cm. Biopsied. FRAGMENTS OF NONNEOPLASTIC SMALL INTESTINAL MUCOSA WITH NO ACTIVE INFLAMMATION, ULCERATION OR DYSPLASIA PRESENT. THERE IS PRESERVATION OF THE VILLOUS ARCHITECTURE. FRAGMENTS OF NONNEOPLASTIC SMALL INTESTINAL MUCOSA WITH NO ACTIVE INFLAMMATION, ULCERATION OR DYSPLASIA IDENTIFIED.  2013- EGD- Normal esophagus. Z-line regular, 36 cm from the incisors. A single gastric polyp. Resected and retrieved. A small amount of food (residue) in the stomach. Removal was successful. Mucosal abnormality in the duodenum. Biopsied. Mild chronic gastritis but otherwise negative.   2012- EGD- Normal Study.   2012- Colonoscopy- Crohn's colitis mostly involving the distal 30 cm. This was biopsied to r/o CMV, HSV. Mild ileocolonic anastomosis Crohn's disease. Chronic active colitis with surface ulceration, cryptitis, crypt abscess and reactive changes. Negative for CMV.      Imagin24 MRCP: No evidence of biliary obstruction.Punctate cystic focus in the pancreatic tail probably communicating with the main pancreatic duct, likely a side branch IPMN.  Too small to characterize although no worrisome features.  Follow-up in 1 year is recommended.  24 US liver with doppler: Mildly dilated bile ducts in the central right hepatic lobe.  Prominence of the common bile duct is better evaluated on CT 2023.  Follow-up/further evaluation as warranted clinically.  23 CT A/P with contrast-  There is decompression of a few scattered mid small bowel loops noting distension and slow flow of the distal small bowel and associated wall hyperemia. The ileostomy appears patent there is scattered interloop fluid and venous vascular engorgement involving the distended bowel loops, no findings to suggest abscess.     2021 MRE-no active inflammation.     Problem List:  Crohn's Disease of the small and large intestines, s/p total proctocolectomy with end  ileostomy (2012), post-op course complicated by perineal wound for ~1 year (now healed), currently off all therapies    Mild Malnutrition (Albumin 3.3)   Normocytic Anemia (stable)  GERD  History of colovesical fistula s/p operative management (1992)  History of abdominal abscess s/p operative management (1998)   Drug induced pancreatitis 2/2 Imuran   High ostomy output-improving     Hospital Course:  Output continues to be high >1.5L. We increased her imodium to max dose 4 mg 4x a day in order to try to decrease her output. We also added PRN low dose liquid lomotil to assist with continued increased output if needed. She has not taken the PRN lomotil despite having continued high output. She continues on IVFs to assist with fluid replacement lost from ostomy output and her labs continue to be stable despite high output. On 1/19, acutely worsening abdominal pain. CTAP ordered, which did not show any signs of obstruction. IV solumedrol 60 mg x1 given on 1/19. Pain and stool output seem to be improving with IV steroids but overnight on 1/22, she had an increase in her ostomy output to 690cc.     Recommendations:  - IV solumedrol 60 mg x1 given 1/19. Now on day #4 of IV steroids (20 mg TID). Continue IV steroids for now.  - Continue liquid imodium 4 mg QID with PRN lomotil 5ml 4 x day for continued increased output.  - Will workup other causes of diarrhea: VIP,5HIAA and gastrin levels ordered.    - If ostomy output continues to increase may add Ondansetron.   - Agree with IVFs for high ostomy output.  - Follow up stool studies; C diff, E.coli, stool cx negative. Fecal fat and fecal elastase normal. Awaiting GI pathogen panel, stool calprotectin.  - Will hold off on repeat endoscopy as she was just had an EGD/Ileoscopy on 1/03 and this is unlikely to  at this time.   - DVT ppx with Lovenox ordered, refused dose on 1/19 (MAR reviewed).  - IV morphine ordered for pain, okay to use given short acting  nature. Would avoid escalating beyond this.  - Renvela discontinued as this may actually worsening GI sxs and is not beneficial in someone without CKD. Her phos is now normal.   - Would avoid use of NSAIDs.   - We will continue to follow.

## 2024-01-23 NOTE — PROGRESS NOTES
[facility] (Inflammatory Bowel Disease) Progress Note:    Reason for Consult: Crohn's disease w/high ileostomy output    Brief History:  Ivy Salgado is a 43 y.o. female for whom GI is consulted for IBD management. 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 GI/IBD consulted for further management of high ostomy output.     She was recently admitted from 12/31 to 1/07 with high ileostomy output and liver enzyme elevation; stool infectious workup negative. She underwent EGD with mild gastritis and ileoscopy with 6 apthous ulcers in the distal ileum. She had some dilation of the bile ducts in the central right hepatic lobe. MRCP showed punctate cystic focus in the pancreatic tail, likely a side IPMN. Prior to discharge, she did have an increase in her ostomy output which improved with scheduled imodium. Over the past 4 to 5 days, she has had an increase in her ileostomy output, which she notes as green, nonbloody. She has been unable to keep up with her fluid intake and has not had an appetite. Associated symptoms include persistent right sided abdominal pain. She was seen in IBD clinic on 1/16 with recommendations for hospital admission. Denies fevers, chills, vomiting, bloody stools. On arrival, she was afebrile and HDS. On labs on admission. She was admitted to Hospital Medicine further workup.     Interval History:  - over 1.5 L of output with one episode of unmeasured  - abdominal pain overnight  - Calprotectin from 1/16  93.9 - improved from  when was 281.3  - gi path panel - normal   - LFTs WNL  - Solumedrol 20mg TID - day 5    IBD History      Current IBD Meds: Entyvio pending restart  Current GI Meds: imodium liquid    Review of Systems   Constitutional:  Positive for malaise/fatigue. Negative for chills, fever and weight loss.   HENT:          No oral ulcers, dysphagia, oral thrush   Eyes:  Negative for blurred vision, pain and redness.   Respiratory:  Negative for cough and shortness of breath.    Cardiovascular:  Negative for chest pain.   Gastrointestinal:  Positive for abdominal pain and diarrhea. Negative for heartburn, nausea and vomiting.   Genitourinary:  Negative for dysuria and hematuria.   Musculoskeletal:  Negative for back pain and joint pain.   Skin:  Negative for rash.   Psychiatric/Behavioral:  Negative for depression. The patient is not nervous/anxious and does not have insomnia.      Prior Pertinent Surgeries:   - Unclear, colon resection to address fistulous disease process  - Unclear, colon resection to address abscess   - S/p total proctocolectomy with end ileostomy, post-op course complicated by perineal wound which she followed with CRS for ~ 1 year (Dr. 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  2017- Ileoscopy- The examined portion of the ileum was normal.   Biopsied. Widely patent end ileostomy with healthy appearing mucosa at the ileostomy. Pathology showed fragments of unremarkable small bowel mucosa. No evidence of active colitis, granuloma, dysplasia or malignancy  10/2016- EGD- Normal esophagus. Small hiatus hernia. Erythematous mucosa in the gastric body.  Biopsied. Trace of hematin in the gastric body.   Normal examined duodenum.  Two biopsies were obtained in the duodenal bulb. Four biopsies were obtained in the 2nd part of the duodenum.   10/2016- Ileoscopy- The examined portion of the ileum was normal. Biopsies without any acute abnormalities- CMV, HSV-1 AND HSV-2 stains performed. No celiac. No. H pylori. Normal mucosa of the small bowel.   10/2014- EGD- Normal Study. Biopsied to r/o Crohn's.    10/2014- Ileoscopy- The examined portion of the ileum was normal. Biopsies normal.  2/2014- SBE- A single (solitary) ulcer in the proximal ileum. Biopsied. Mild non-specific enteritis.   9/2013- Ileoscopy- Congested, eroded and ulcerated mucosa in the area at 5 cm proximal to the stoma. Biopsied. The examined portion of the ileum was normal otherwise up to 30 cm. Biopsied. FRAGMENTS OF NONNEOPLASTIC SMALL INTESTINAL MUCOSA WITH NO ACTIVE INFLAMMATION, ULCERATION OR DYSPLASIA PRESENT. THERE IS PRESERVATION OF THE VILLOUS ARCHITECTURE. FRAGMENTS OF NONNEOPLASTIC SMALL INTESTINAL MUCOSA WITH NO ACTIVE INFLAMMATION, ULCERATION OR DYSPLASIA IDENTIFIED.  9/2013- EGD- Normal esophagus. Z-line regular, 36 cm from the incisors. A single gastric polyp. Resected and retrieved. A small amount of food (residue) in the stomach. Removal was successful. Mucosal abnormality in the duodenum. Biopsied. Mild chronic gastritis but otherwise negative.   5/2012- EGD- Normal Study.   5/2012- Colonoscopy- Crohn's colitis mostly involving the distal 30 cm. This was biopsied to r/o CMV, HSV. Mild ileocolonic anastomosis Crohn's disease. Chronic active colitis with surface ulceration, cryptitis, crypt abscess and reactive changes. Negative for CMV.  Therapeutic Drug Monitoring Labs:    Prior IBD Therapies:  Entyvio/Vedolizumab- most recent therapy, was on this from 9504-3155 and then stopped as she was getting recurrent UTI's/pyelonephritis   Stelara/Ustekinumab- was on this prior to Entyvio and had  "multiple infections   Cimzia/Certolizumab- stopped working after some years, unclear on dates, records note she was on this in 2016   Remicade/Inflixmab- stopped working after many years, unsure about antibodies   Humira/Adalimumab- Suspected drug-induced lupus due to joint pains  Prednisone tapers- effective, caused avascular necrosis to her left shoulder requiring arthroplasty   Imuran/Azathioprine- stopped due to drug-induced pancreatitis   Methotrexate- stopped due to significant leukopenia  Sulfasalazine at some point prior to 2012  Entocort- at some point prior to 2012   Canasa- at some point prior to 2012    Inpatient Medications:     droNABinol  5 mg Oral BID AC    enoxparin  40 mg Subcutaneous Q24H (prophylaxis, 1700)    gabapentin  300 mg Oral BID    loperamide  4 mg Oral QID    methylPREDNISolone sodium succinate injection  20 mg Intravenous TID    mirtazapine  30 mg Oral Nightly    multivitamin  1 tablet Oral Daily    nadoloL  40 mg Oral Daily    pantoprazole  40 mg Oral Daily       Vital Signs:  /76   Pulse 69   Temp 97.4 °F (36.3 °C) (Oral)   Resp 18   Ht 5' 1" (1.549 m)   Wt 51.3 kg (113 lb 1.5 oz)   LMP 03/30/2015   SpO2 98%   Breastfeeding No   BMI 21.37 kg/m²      Physical Exam  Vitals and nursing note reviewed.   Constitutional:       General: She is in acute distress (upon first AM assessment).      Appearance: She is ill-appearing.   Cardiovascular:      Rate and Rhythm: Normal rate.      Pulses: Normal pulses.      Heart sounds: No murmur heard.  Pulmonary:      Effort: Pulmonary effort is normal. No respiratory distress.   Abdominal:      General: Bowel sounds are normal. There is no distension.      Palpations: Abdomen is soft.      Tenderness: There is abdominal tenderness.   Skin:     General: Skin is warm and dry.      Capillary Refill: Capillary refill takes 2 to 3 seconds.   Neurological:      Mental Status: She is alert and oriented to person, place, and time. "         Labs:   Lab Results   Component Value Date    WBC 11.04 01/23/2024    HGB 10.4 (L) 01/23/2024    HCT 33.0 (L) 01/23/2024    MCV 91 01/23/2024     01/23/2024     Lab Results   Component Value Date    CREATININE 0.8 01/23/2024    ALBUMIN 3.2 (L) 01/23/2024    BILITOT 0.2 01/23/2024    ALKPHOS 114 01/23/2024    AST 9 (L) 01/23/2024    ALT 10 01/23/2024     Lab Results   Component Value Date    CRP 3.3 01/21/2024    CALPROTECTIN 281.3 (H) 12/31/2023     Lab Results   Component Value Date    HEPBSAG Non-reactive 09/19/2023    HEPBCAB Non-reactive 01/02/2024     Lab Results   Component Value Date    TBGOLDPLUS Negative 01/02/2024     Lab Results   Component Value Date    PGKYSIDP82AR 21 (L) 06/13/2019    QRCHWESD89 278 01/02/2024       Microbiology Results (last 7 days)       Procedure Component Value Units Date/Time    Blood culture [[id number]] Collected: 01/18/24 1659    Order Status: Completed Specimen: Blood Updated: 01/22/24 2012     Blood Culture, Routine No Growth to date      No Growth to date      No Growth to date      No Growth to date      No Growth to date    Blood culture [[id number]] Collected: 01/18/24 1659    Order Status: Completed Specimen: Blood Updated: 01/22/24 2012     Blood Culture, Routine No Growth to date      No Growth to date      No Growth to date      No Growth to date      No Growth to date    Stool culture [[id number]] Collected: 01/16/24 1332    Order Status: Completed Specimen: Stool Updated: 01/19/24 1057     Stool Culture No Salmonella,Shigella,Vibrio,Campylobacter,Yersinia isolated.    E. coli 0157 antigen [[id number]] Collected: 01/16/24 1332    Order Status: Completed Specimen: Stool Updated: 01/18/24 1152     Shiga Toxin 1 E.coli Negative     Shiga Toxin 2 E.coli Negative    Clostridium difficile EIA [[id number]] Collected: 01/16/24 1332    Order Status: Completed Specimen: Stool Updated: 01/17/24 0115     C. diff Antigen Negative     C difficile Toxins A+B, EIA  Negative     Comment: Testing not recommended for children <24 months old.                Impression:  Ivy Salgado is a 43 y.o. female for whom GI is consulted for IBD management. 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 GI/IBD consulted for further management of high ostomy output.     Since her admit, her stool studies have been unremarkable. Her output continues to be high >1.5L. We increased her imodium to max dose 4 mg 4x a day in order to try to decrease her output. We also added PRN low dose liquid lomotil to assist with continued increased output if needed. She has not taken the PRN lomotil despite having continued high output. She continues on IVFs to assist with fluid replacement lost from ostomy output and her labs continue to be stable despite high output. This morning solumedrol 60 mg was started to try to temper her symptoms which was given.     GI Pathogens panel negative and stool calprotectin significantly decreased since 12/31. Patient continues with high output and R sided abdominal pain despite steroids. We initially thought about adding zofran but due to patient's prolonged QTC interval and history of significant symptoms unable to use this medication. With patient's intermittent severe R sided and generalized upper abdominal pain, I am beginning to lean towards some of the pain is related to potential IBS components. Again medications used for functional and IBS pains are limited due to the patient's prolonged QTC  interval. If her pain and output do not improve, will reach out to pharmacist to discuss options available that might help with treating this pain and output component.    Crohn's Disease  High ileostomy output  Acute abdominal pain     Plan:  - Continue Solumedrol 20 mg TID   - Continue liquid imodium to 4 mg 4 x a day  - PRN lomotil 5ml 4 x day for continued increased output  - IVFs for high ostomy output  - Will hold off on endoscopy for now since she did just have an ileoscopy on 1/3  - Avoid NSAIDs since this may exacerbate IBD  - DVT prophylaxis (IBD pts increased risk of VTE) - Lovenox 40 mg QD  - Opiates- if necessary, IV morphine is preferred due to short acting nature      Shelby Zhong NP   Department of Gastroenterology  Inflammatory Bowel Disease

## 2024-01-23 NOTE — SUBJECTIVE & OBJECTIVE
Subjective:     Interval History:   Stool output of 690 overnight, decreased from initial admission but a trend up over the last day. She is now on Day #4 of IV steroids.    Review of Systems   Constitutional:  Negative for chills and fever.   Gastrointestinal:  Positive for abdominal distention, abdominal pain and diarrhea. Negative for blood in stool, nausea and vomiting.     Objective:     Vital Signs (Most Recent):  Temp: 98.6 °F (37 °C) (01/23/24 0452)  Pulse: (!) 59 (01/23/24 0452)  Resp: 16 (01/23/24 0619)  BP: 133/86 (01/23/24 0452)  SpO2: 98 % (01/23/24 0452) Vital Signs (24h Range):  Temp:  [97.8 °F (36.6 °C)-99.5 °F (37.5 °C)] 98.6 °F (37 °C)  Pulse:  [59-69] 59  Resp:  [14-18] 16  SpO2:  [95 %-98 %] 98 %  BP: (111-181)/(69-86) 133/86     Weight: 51.3 kg (113 lb 1.5 oz) (01/18/24 0545)  Body mass index is 21.37 kg/m².    No intake or output data in the 24 hours ending 01/23/24 0620    Lines/Drains/Airways       Central Venous Catheter Line  Duration                  PowerPort A Cath Single Lumen Atrial Left -- days              Drain  Duration                  Ileostomy 06/16/12 0000 RLQ 4238 days                     Physical Exam  Vitals reviewed.   Constitutional:       General: She is not in acute distress.     Appearance: She is ill-appearing.   HENT:      Mouth/Throat:      Mouth: Mucous membranes are moist.   Eyes:      Extraocular Movements: Extraocular movements intact.   Pulmonary:      Effort: Pulmonary effort is normal.   Abdominal:      General: There is no distension.      Palpations: Abdomen is soft.      Tenderness: There is abdominal tenderness (Right sided). There is no guarding or rebound.   Neurological:      Mental Status: She is alert. Mental status is at baseline.          Significant Labs:  All pertinent lab results from the last 24 hours have been reviewed.      Significant Imaging:  Imaging results within the past 24 hours have been reviewed.

## 2024-01-24 ENCOUNTER — ANESTHESIA (OUTPATIENT)
Dept: ENDOSCOPY | Facility: HOSPITAL | Age: 44
DRG: 386 | End: 2024-01-24
Payer: COMMERCIAL

## 2024-01-24 ENCOUNTER — PATIENT MESSAGE (OUTPATIENT)
Dept: REHABILITATION | Facility: HOSPITAL | Age: 44
End: 2024-01-24
Payer: COMMERCIAL

## 2024-01-24 ENCOUNTER — ANESTHESIA EVENT (OUTPATIENT)
Dept: ENDOSCOPY | Facility: HOSPITAL | Age: 44
DRG: 386 | End: 2024-01-24
Payer: COMMERCIAL

## 2024-01-24 PROBLEM — K50.00 TERMINAL ILEITIS OF SMALL INTESTINE: Status: ACTIVE | Noted: 2024-01-24

## 2024-01-24 PROBLEM — E44.1 MILD MALNUTRITION: Status: ACTIVE | Noted: 2024-01-24

## 2024-01-24 LAB
ALBUMIN SERPL BCP-MCNC: 3 G/DL (ref 3.5–5.2)
ALP SERPL-CCNC: 93 U/L (ref 55–135)
ALT SERPL W/O P-5'-P-CCNC: 10 U/L (ref 10–44)
ANION GAP SERPL CALC-SCNC: 11 MMOL/L (ref 8–16)
AST SERPL-CCNC: 9 U/L (ref 10–40)
BASOPHILS # BLD AUTO: 0.02 K/UL (ref 0–0.2)
BASOPHILS NFR BLD: 0.2 % (ref 0–1.9)
BILIRUB SERPL-MCNC: 0.2 MG/DL (ref 0.1–1)
BUN SERPL-MCNC: 14 MG/DL (ref 6–20)
CALCIUM SERPL-MCNC: 8.8 MG/DL (ref 8.7–10.5)
CHLORIDE SERPL-SCNC: 110 MMOL/L (ref 95–110)
CO2 SERPL-SCNC: 21 MMOL/L (ref 23–29)
CREAT SERPL-MCNC: 0.7 MG/DL (ref 0.5–1.4)
DIFFERENTIAL METHOD BLD: ABNORMAL
EOSINOPHIL # BLD AUTO: 0 K/UL (ref 0–0.5)
EOSINOPHIL NFR BLD: 0 % (ref 0–8)
ERYTHROCYTE [DISTWIDTH] IN BLOOD BY AUTOMATED COUNT: 12.8 % (ref 11.5–14.5)
EST. GFR  (NO RACE VARIABLE): >60 ML/MIN/1.73 M^2
GLUCOSE SERPL-MCNC: 130 MG/DL (ref 70–110)
HCT VFR BLD AUTO: 31.8 % (ref 37–48.5)
HGB BLD-MCNC: 10.1 G/DL (ref 12–16)
IMM GRANULOCYTES # BLD AUTO: 0.07 K/UL (ref 0–0.04)
IMM GRANULOCYTES NFR BLD AUTO: 0.8 % (ref 0–0.5)
LYMPHOCYTES # BLD AUTO: 1.8 K/UL (ref 1–4.8)
LYMPHOCYTES NFR BLD: 19.7 % (ref 18–48)
MAGNESIUM SERPL-MCNC: 1.9 MG/DL (ref 1.6–2.6)
MCH RBC QN AUTO: 28.4 PG (ref 27–31)
MCHC RBC AUTO-ENTMCNC: 31.8 G/DL (ref 32–36)
MCV RBC AUTO: 89 FL (ref 82–98)
MONOCYTES # BLD AUTO: 0.4 K/UL (ref 0.3–1)
MONOCYTES NFR BLD: 3.8 % (ref 4–15)
NEUTROPHILS # BLD AUTO: 7 K/UL (ref 1.8–7.7)
NEUTROPHILS NFR BLD: 75.5 % (ref 38–73)
NRBC BLD-RTO: 0 /100 WBC
PHOSPHATE SERPL-MCNC: 3.9 MG/DL (ref 2.7–4.5)
PLATELET # BLD AUTO: 393 K/UL (ref 150–450)
PMV BLD AUTO: 9.8 FL (ref 9.2–12.9)
POTASSIUM SERPL-SCNC: 4.3 MMOL/L (ref 3.5–5.1)
PROT SERPL-MCNC: 6.6 G/DL (ref 6–8.4)
RBC # BLD AUTO: 3.56 M/UL (ref 4–5.4)
SODIUM SERPL-SCNC: 142 MMOL/L (ref 136–145)
WBC # BLD AUTO: 9.29 K/UL (ref 3.9–12.7)

## 2024-01-24 PROCEDURE — 11000001 HC ACUTE MED/SURG PRIVATE ROOM

## 2024-01-24 PROCEDURE — 84100 ASSAY OF PHOSPHORUS: CPT

## 2024-01-24 PROCEDURE — 80053 COMPREHEN METABOLIC PANEL: CPT

## 2024-01-24 PROCEDURE — 88305 TISSUE EXAM BY PATHOLOGIST: CPT | Mod: 26,,, | Performed by: PATHOLOGY

## 2024-01-24 PROCEDURE — 86316 IMMUNOASSAY TUMOR OTHER: CPT | Performed by: INTERNAL MEDICINE

## 2024-01-24 PROCEDURE — 63600175 PHARM REV CODE 636 W HCPCS

## 2024-01-24 PROCEDURE — 0DJD8ZZ INSPECTION OF LOWER INTESTINAL TRACT, VIA NATURAL OR ARTIFICIAL OPENING ENDOSCOPIC: ICD-10-PCS | Performed by: STUDENT IN AN ORGANIZED HEALTH CARE EDUCATION/TRAINING PROGRAM

## 2024-01-24 PROCEDURE — 25000003 PHARM REV CODE 250: Performed by: NURSE ANESTHETIST, CERTIFIED REGISTERED

## 2024-01-24 PROCEDURE — 44382 SMALL BOWEL ENDOSCOPY: CPT | Mod: ,,, | Performed by: STUDENT IN AN ORGANIZED HEALTH CARE EDUCATION/TRAINING PROGRAM

## 2024-01-24 PROCEDURE — 82308 ASSAY OF CALCITONIN: CPT | Performed by: INTERNAL MEDICINE

## 2024-01-24 PROCEDURE — 25000003 PHARM REV CODE 250

## 2024-01-24 PROCEDURE — 0DBB8ZX EXCISION OF ILEUM, VIA NATURAL OR ARTIFICIAL OPENING ENDOSCOPIC, DIAGNOSTIC: ICD-10-PCS | Performed by: STUDENT IN AN ORGANIZED HEALTH CARE EDUCATION/TRAINING PROGRAM

## 2024-01-24 PROCEDURE — D9220A PRA ANESTHESIA: Mod: ANES,,, | Performed by: ANESTHESIOLOGY

## 2024-01-24 PROCEDURE — 88305 TISSUE EXAM BY PATHOLOGIST: CPT | Performed by: PATHOLOGY

## 2024-01-24 PROCEDURE — 85025 COMPLETE CBC W/AUTO DIFF WBC: CPT

## 2024-01-24 PROCEDURE — 44382 SMALL BOWEL ENDOSCOPY: CPT | Performed by: STUDENT IN AN ORGANIZED HEALTH CARE EDUCATION/TRAINING PROGRAM

## 2024-01-24 PROCEDURE — 63600175 PHARM REV CODE 636 W HCPCS: Performed by: STUDENT IN AN ORGANIZED HEALTH CARE EDUCATION/TRAINING PROGRAM

## 2024-01-24 PROCEDURE — 63600175 PHARM REV CODE 636 W HCPCS: Performed by: NURSE ANESTHETIST, CERTIFIED REGISTERED

## 2024-01-24 PROCEDURE — 63600175 PHARM REV CODE 636 W HCPCS: Performed by: ANESTHESIOLOGY

## 2024-01-24 PROCEDURE — 37000008 HC ANESTHESIA 1ST 15 MINUTES: Performed by: STUDENT IN AN ORGANIZED HEALTH CARE EDUCATION/TRAINING PROGRAM

## 2024-01-24 PROCEDURE — 84307 ASSAY OF SOMATOSTATIN: CPT | Performed by: INTERNAL MEDICINE

## 2024-01-24 PROCEDURE — 37000009 HC ANESTHESIA EA ADD 15 MINS: Performed by: STUDENT IN AN ORGANIZED HEALTH CARE EDUCATION/TRAINING PROGRAM

## 2024-01-24 PROCEDURE — 99233 SBSQ HOSP IP/OBS HIGH 50: CPT | Mod: FS,,, | Performed by: INTERNAL MEDICINE

## 2024-01-24 PROCEDURE — 83993 ASSAY FOR CALPROTECTIN FECAL: CPT

## 2024-01-24 PROCEDURE — 83735 ASSAY OF MAGNESIUM: CPT

## 2024-01-24 PROCEDURE — D9220A PRA ANESTHESIA: Mod: CRNA,,, | Performed by: NURSE ANESTHETIST, CERTIFIED REGISTERED

## 2024-01-24 PROCEDURE — 94761 N-INVAS EAR/PLS OXIMETRY MLT: CPT

## 2024-01-24 RX ORDER — PROPOFOL 10 MG/ML
VIAL (ML) INTRAVENOUS
Status: DISCONTINUED | OUTPATIENT
Start: 2024-01-24 | End: 2024-01-24

## 2024-01-24 RX ORDER — MORPHINE SULFATE 2 MG/ML
2 INJECTION, SOLUTION INTRAMUSCULAR; INTRAVENOUS EVERY 6 HOURS PRN
Status: DISCONTINUED | OUTPATIENT
Start: 2024-01-24 | End: 2024-01-27 | Stop reason: HOSPADM

## 2024-01-24 RX ORDER — MORPHINE SULFATE 4 MG/ML
4 INJECTION, SOLUTION INTRAMUSCULAR; INTRAVENOUS EVERY 6 HOURS PRN
Status: DISCONTINUED | OUTPATIENT
Start: 2024-01-24 | End: 2024-01-27 | Stop reason: HOSPADM

## 2024-01-24 RX ORDER — SODIUM CHLORIDE 0.9 % (FLUSH) 0.9 %
3 SYRINGE (ML) INJECTION
Status: DISCONTINUED | OUTPATIENT
Start: 2024-01-24 | End: 2024-01-24 | Stop reason: HOSPADM

## 2024-01-24 RX ORDER — FENTANYL CITRATE 50 UG/ML
INJECTION, SOLUTION INTRAMUSCULAR; INTRAVENOUS
Status: DISCONTINUED | OUTPATIENT
Start: 2024-01-24 | End: 2024-01-24

## 2024-01-24 RX ORDER — MIDAZOLAM HYDROCHLORIDE 1 MG/ML
INJECTION, SOLUTION INTRAMUSCULAR; INTRAVENOUS
Status: DISCONTINUED | OUTPATIENT
Start: 2024-01-24 | End: 2024-01-24

## 2024-01-24 RX ORDER — FENTANYL CITRATE 50 UG/ML
25 INJECTION, SOLUTION INTRAMUSCULAR; INTRAVENOUS EVERY 5 MIN PRN
Status: DISCONTINUED | OUTPATIENT
Start: 2024-01-24 | End: 2024-01-24 | Stop reason: HOSPADM

## 2024-01-24 RX ORDER — HALOPERIDOL 5 MG/ML
0.5 INJECTION INTRAMUSCULAR EVERY 10 MIN PRN
Status: DISCONTINUED | OUTPATIENT
Start: 2024-01-24 | End: 2024-01-24 | Stop reason: HOSPADM

## 2024-01-24 RX ORDER — PREDNISONE 20 MG/1
40 TABLET ORAL DAILY
Status: DISCONTINUED | OUTPATIENT
Start: 2024-01-25 | End: 2024-01-27 | Stop reason: HOSPADM

## 2024-01-24 RX ORDER — PROCHLORPERAZINE EDISYLATE 5 MG/ML
5 INJECTION INTRAMUSCULAR; INTRAVENOUS EVERY 30 MIN PRN
Status: DISCONTINUED | OUTPATIENT
Start: 2024-01-24 | End: 2024-01-24 | Stop reason: HOSPADM

## 2024-01-24 RX ORDER — DIPHENHYDRAMINE HYDROCHLORIDE 50 MG/ML
25 INJECTION INTRAMUSCULAR; INTRAVENOUS EVERY 6 HOURS PRN
Status: DISCONTINUED | OUTPATIENT
Start: 2024-01-24 | End: 2024-01-24 | Stop reason: HOSPADM

## 2024-01-24 RX ORDER — LIDOCAINE HYDROCHLORIDE 20 MG/ML
INJECTION, SOLUTION EPIDURAL; INFILTRATION; INTRACAUDAL; PERINEURAL
Status: DISCONTINUED | OUTPATIENT
Start: 2024-01-24 | End: 2024-01-24

## 2024-01-24 RX ORDER — OXYCODONE AND ACETAMINOPHEN 10; 325 MG/1; MG/1
1 TABLET ORAL EVERY 4 HOURS PRN
Status: DISCONTINUED | OUTPATIENT
Start: 2024-01-24 | End: 2024-01-27 | Stop reason: HOSPADM

## 2024-01-24 RX ADMIN — MIRTAZAPINE 30 MG: 15 TABLET, ORALLY DISINTEGRATING ORAL at 09:01

## 2024-01-24 RX ADMIN — FENTANYL CITRATE 25 MCG: 50 INJECTION INTRAMUSCULAR; INTRAVENOUS at 10:01

## 2024-01-24 RX ADMIN — MORPHINE SULFATE 4 MG: 4 INJECTION INTRAVENOUS at 05:01

## 2024-01-24 RX ADMIN — NADOLOL 40 MG: 20 TABLET ORAL at 08:01

## 2024-01-24 RX ADMIN — PROMETHAZINE HYDROCHLORIDE 25 MG: 25 TABLET ORAL at 05:01

## 2024-01-24 RX ADMIN — METHYLPREDNISOLONE SODIUM SUCCINATE 20 MG: 40 INJECTION, POWDER, FOR SOLUTION INTRAMUSCULAR; INTRAVENOUS at 09:01

## 2024-01-24 RX ADMIN — THERA TABS 1 TABLET: TAB at 08:01

## 2024-01-24 RX ADMIN — FENTANYL CITRATE 25 MCG: 0.05 INJECTION, SOLUTION INTRAMUSCULAR; INTRAVENOUS at 10:01

## 2024-01-24 RX ADMIN — METHYLPREDNISOLONE SODIUM SUCCINATE 20 MG: 40 INJECTION, POWDER, FOR SOLUTION INTRAMUSCULAR; INTRAVENOUS at 08:01

## 2024-01-24 RX ADMIN — CLONAZEPAM 1 MG: 0.5 TABLET ORAL at 09:01

## 2024-01-24 RX ADMIN — LOPERAMIDE HYDROCHLORIDE 4 MG: 1 SOLUTION ORAL at 09:01

## 2024-01-24 RX ADMIN — FENTANYL CITRATE 25 MCG: 0.05 INJECTION, SOLUTION INTRAMUSCULAR; INTRAVENOUS at 11:01

## 2024-01-24 RX ADMIN — PROPOFOL 30 MG: 10 INJECTION, EMULSION INTRAVENOUS at 10:01

## 2024-01-24 RX ADMIN — GABAPENTIN 300 MG: 300 CAPSULE ORAL at 09:01

## 2024-01-24 RX ADMIN — PROCHLORPERAZINE EDISYLATE 2.5 MG: 5 INJECTION INTRAMUSCULAR; INTRAVENOUS at 01:01

## 2024-01-24 RX ADMIN — ENOXAPARIN SODIUM 40 MG: 40 INJECTION SUBCUTANEOUS at 04:01

## 2024-01-24 RX ADMIN — MORPHINE SULFATE 4 MG: 4 INJECTION INTRAVENOUS at 01:01

## 2024-01-24 RX ADMIN — METHYLPREDNISOLONE SODIUM SUCCINATE 20 MG: 40 INJECTION, POWDER, FOR SOLUTION INTRAMUSCULAR; INTRAVENOUS at 03:01

## 2024-01-24 RX ADMIN — PROMETHAZINE HYDROCHLORIDE 25 MG: 25 TABLET ORAL at 01:01

## 2024-01-24 RX ADMIN — DRONABINOL 5 MG: 2.5 CAPSULE ORAL at 05:01

## 2024-01-24 RX ADMIN — LOPERAMIDE HYDROCHLORIDE 4 MG: 1 SOLUTION ORAL at 01:01

## 2024-01-24 RX ADMIN — OXYCODONE AND ACETAMINOPHEN 1 TABLET: 10; 325 TABLET ORAL at 09:01

## 2024-01-24 RX ADMIN — DRONABINOL 5 MG: 2.5 CAPSULE ORAL at 03:01

## 2024-01-24 RX ADMIN — PANTOPRAZOLE SODIUM 40 MG: 40 TABLET, DELAYED RELEASE ORAL at 08:01

## 2024-01-24 RX ADMIN — SODIUM CHLORIDE: 0.9 INJECTION, SOLUTION INTRAVENOUS at 09:01

## 2024-01-24 RX ADMIN — OXYCODONE AND ACETAMINOPHEN 1 TABLET: 10; 325 TABLET ORAL at 04:01

## 2024-01-24 RX ADMIN — LIDOCAINE HYDROCHLORIDE 50 MG: 20 INJECTION, SOLUTION EPIDURAL; INFILTRATION; INTRACAUDAL at 09:01

## 2024-01-24 RX ADMIN — MIDAZOLAM 2 MG: 1 INJECTION INTRAMUSCULAR; INTRAVENOUS at 09:01

## 2024-01-24 RX ADMIN — LOPERAMIDE HYDROCHLORIDE 4 MG: 1 SOLUTION ORAL at 04:01

## 2024-01-24 RX ADMIN — MORPHINE SULFATE 2 MG: 2 INJECTION, SOLUTION INTRAMUSCULAR; INTRAVENOUS at 01:01

## 2024-01-24 RX ADMIN — CLONAZEPAM 1 MG: 0.5 TABLET ORAL at 08:01

## 2024-01-24 RX ADMIN — GABAPENTIN 300 MG: 300 CAPSULE ORAL at 08:01

## 2024-01-24 RX ADMIN — MORPHINE SULFATE 4 MG: 4 INJECTION INTRAVENOUS at 06:01

## 2024-01-24 RX ADMIN — PROPOFOL 50 MG: 10 INJECTION, EMULSION INTRAVENOUS at 09:01

## 2024-01-24 NOTE — ASSESSMENT & PLAN NOTE
43-year-old woman with PMH GERD, Crohn's s/p ileostomy presented to GI clinic with high ileostomy output 01/16 [place] [facility] [place] with Crohn's flare.  Admission requested by GI (Dr. Peace Garcia) for IVF, ileostomy through stoma to see if Crohn's is worse, stool studies and treatment to get her output down.  Patient reports symptoms of nausea, abdominal pain, and high ileostomy output to be similar to past Crohn's flares.   Upon transfer, HDS and AF. CBC unremarkable. CMP unremarkable. ESR 65. Mg 1.2. Phos 5.3. . CRP 1.8    Plan:  - Consulted GI/IBD Team  - Plan for repeat ileoscopy tomorrow 1/24. NPO at [facility].   - F/u VIP, 5HIAA, Gastrin  - Stool Culture and Blood cultures with no growth to date  - C.Diff negative; E Coli Ag negative  - Continuing Solumedrol 20 mg IV TID.  - Continuous IVF - LR @ 100/hr  - Anti-diarrheals  - Daily CBC, CMP, Mg, and Phos

## 2024-01-24 NOTE — PLAN OF CARE
Recommendations    1. ADAT to Regular diet, increase fiberous foods to add bulk to stools (gradually). High calorie, high-protein food sources encouraged.   2. IV fluids as needed.   3. Encourage calcium, vitamin D and omega 3 food sources, B-complex vitamin supplementation recommended.   4. Recommend nutrition supplementation for additional calories/protein (plant-based/clear may be better tolerated.)   5. RD following.    Goals: Meet % EEN/EPN by follow-up date.  Nutrition Goal Status: new  Communication of RD Recs: other (comment) (POC)

## 2024-01-24 NOTE — H&P
Short Stay Endoscopy History and Physical    PCP - Luis Madden DO  Referring Physician - Dony Paz MD  [address] [place] [address]    Procedure - Endoscopy  ASA - per anesthesia  Mallampati - per anesthesia  History of Anesthesia problems - no  Family history Anesthesia problems -  no   Plan of anesthesia - General    HPI  43 y.o. female  Reason for procedure:   Increased ostomy output    ROS:  Constitutional: No fevers, chills, No weight loss  CV: No chest pain  Pulm: No cough, No shortness of breath  GI: see HPI    Medical History:  has a past medical history of Abnormal Pap smear (2007), Abnormal Pap smear (5/26/2011), Anemia, Anxiety, Arthritis, C. difficile diarrhea, Crohn's disease, Depression (8/5/2017), Encounter for blood transfusion, Genital HSV, History of colposcopy with cervical biopsy (2007 and 7/2011), Hypertension, Kidney stone, Kidney stone, Melanoma, Recurrent UTI (4/3/2013), S/P ileostomy (7/9/2012), and Sterilization (6/23/2012).    Surgical History:  has a past surgical history that includes Total colectomy; Ileostomy; Colon surgery; Upper gastrointestinal endoscopy; Colonoscopy; CKC; Appendectomy; Bladder surgery; Skin biopsy; Abdominal surgery; Oophorectomy (Right, 04/16/2015); Portacath placement (02/21/2017); Total abdominal hysterectomy (04/16/2015); Small intestine surgery; Tubal ligation (06/06/2012); Bilateral salpingo-oophorectomy (BSO) (Bilateral, 05/30/2019); Lysis of adhesions (N/A, 05/30/2019); Excision of melanoma (07/17/2019); Diagnostic laparoscopy (N/A, 07/09/2020); Laparoscopic lysis of adhesions (N/A, 07/09/2020); Cystoscopy w/ ureteral stent placement (Left, 09/15/2020); Ureteroscopy (Left, 09/23/2020); Cystoscopy (09/23/2020); Laser lithotripsy (09/23/2020); Ureteroscopic removal of ureteric calculus (09/23/2020); breast lift; Breast surgery; Augmentation of breast (Bilateral, 06/2022); Arthroplasty of shoulder (Left, 7/18/2023);  Esophagogastroduodenoscopy (N/A, 1/3/2024); and Ileoscopy (N/A, 1/3/2024).    Family History: family history includes Breast cancer (age of onset: 41) in her maternal cousin; Cancer in her father and maternal grandfather; Colon cancer in her father; Crohn's disease in her brother and daughter; Diabetes in her maternal grandfather and paternal grandfather; Endometrial cancer in her maternal aunt; Hearing loss in her paternal grandmother; Heart disease in her maternal grandfather; Hyperlipidemia in her father; Hypertension in her mother; Liver cancer in her father; Skin cancer in her maternal grandfather..    Social History:  reports that she has never smoked. She has never used smokeless tobacco. She reports that she does not currently use alcohol. She reports that she does not use drugs.    Review of patient's allergies indicates:   Allergen Reactions    Azathioprine sodium Other (See Comments)     Other reaction(s): pancreatitis  Other reaction(s): pancreatitis    Methotrexate analogues Other (See Comments)     leukopenia    Stelara [ustekinumab] Other (See Comments)     Multiple infections    Zofran [ondansetron hcl (pf)] Other (See Comments)     Per patient causes prolong QT    Vancomycin analogues Other (See Comments)     Made her red    Azathioprine      Other reaction(s): Unknown    Methotrexate      Other reaction(s): infection-    Morphine Itching and Other (See Comments)     Other reaction(s): Itching    Zofran [ondansetron hcl]      Other reaction(s): Hives    Bactrim [sulfamethoxazole-trimethoprim] Palpitations    Ciprofloxacin Palpitations       Medications:   Facility-Administered Medications Prior to Admission   Medication Dose Route Frequency Provider Last Rate Last Admin    estradiol valerate (DELESTROGEN) injection 20 mg/mL  20 mg Intramuscular Q30 Days Gilson El MD   20 mg at 12/30/22 0902    estradiol valerate injection 20 mg  20 mg Intramuscular Q30 Days Gilson El MD   20 mg at  23 1327     Medications Prior to Admission   Medication Sig Dispense Refill Last Dose    clonazePAM (KLONOPIN) 1 MG tablet Take 1 tablet (1 mg total) by mouth 2 (two) times daily. (Patient taking differently: Take 1 mg by mouth 2 (two) times daily as needed for Anxiety.) 60 tablet 2     cyclobenzaprine (FLEXERIL) 10 MG tablet Take 1 tablet (10 mg total) by mouth every evening as needed for muscle pain 15 tablet 0     droNABinol (MARINOL) 5 MG capsule Take 1 capsule (5 mg total) by mouth 2 (two) times daily before meals. 60 capsule 1     flu vacc [id number] 6mos up,PF, (FLUARIX QUAD [id number], PF,) 60 mcg (15 mcg x 4)/0.5 mL Syrg inject into muscle for one dose 0.5 mL 0     fluconazole (DIFLUCAN) 150 MG Tab Take 1 tablet (150 mg total) by mouth every 72 hours as needed (vaginal yeast). 3 tablet 0     gabapentin (NEURONTIN) 300 MG capsule Take 1 capsule (300 mg total) by mouth 2 (two) times daily. 60 capsule 11     [] loperamide (IMODIUM) 2 mg capsule Take 1 capsule (2 mg total) by mouth 4 (four) times daily as needed for Diarrhea. 40 capsule 0     mirtazapine (REMERON SOL-TAB) 30 MG disintegrating tablet Take 1 tablet (30 mg total) by mouth nightly. 30 tablet 0     multivitamin (THERAGRAN) per tablet Take 1 tablet by mouth once daily.       nadoloL (CORGARD) 40 MG tablet Take 1 tablet (40 mg total) by mouth once daily. Patient needs appointment before next refill. 30 tablet 0     pantoprazole (PROTONIX) 40 MG tablet Take 1 tablet (40 mg total) by mouth 2 (two) times daily. 60 tablet 12     [] promethazine (PHENERGAN) 25 MG tablet Take 1 tablet (25 mg total) by mouth every 6 (six) hours as needed for Nausea. 40 tablet 0     tamsulosin (FLOMAX) 0.4 mg Cap Take 1 capsule (0.4 mg total) by mouth once daily. 30 capsule 1     triamcinolone acetonide 0.1% (KENALOG) 0.1 % cream Apply topically 2 (two) times daily.       valACYclovir (VALTREX) 500 MG tablet Take 1 tablet (500 mg total) by mouth once daily.  90 tablet 3     vedolizumab (ENTYVIO) 300 mg SolR injection Inject 300 mg into the vein.       zolpidem (AMBIEN) 10 mg Tab Take 1 tablet (10 mg total) by mouth every evening. 30 tablet 2     [DISCONTINUED] estradioL (ESTRACE) 0.01 % (0.1 mg/gram) vaginal cream Place 1 g vaginally once daily. 30 g 12        Physical Exam:    Vital Signs:   Vitals:    01/24/24 0934   BP:    Pulse:    Resp: 16   Temp: 97.7 °F (36.5 °C)       General Appearance: Well appearing in no acute distress  Abdomen: Soft, non tender, non distended with normal bowel sounds, no masses    Labs:  Lab Results   Component Value Date    WBC 9.29 01/24/2024    HGB 10.1 (L) 01/24/2024    HCT 31.8 (L) 01/24/2024     01/24/2024    CHOL 192 01/02/2024    TRIG 150 01/02/2024    HDL 40 01/02/2024    ALT 10 01/24/2024    AST 9 (L) 01/24/2024     01/24/2024    K 4.3 01/24/2024     01/24/2024    CREATININE 0.7 01/24/2024    BUN 14 01/24/2024    CO2 21 (L) 01/24/2024    TSH 2.386 01/02/2024    INR 1.0 01/10/2024    HGBA1C 5.1 01/01/2024       I have explained the risks and benefits of this endoscopic procedure to the patient including but not limited to bleeding, inflammation, infection, perforation, and death.      John Fuentes MD

## 2024-01-24 NOTE — ANESTHESIA POSTPROCEDURE EVALUATION
Anesthesia Post Evaluation    Patient: Ivy Salgado    Procedure(s) Performed: Procedure(s) (LRB):  ILEOSCOPY (N/A)    Final Anesthesia Type: general      Patient location during evaluation: PACU  Patient participation: Yes- Able to Participate  Level of consciousness: awake and alert  Post-procedure vital signs: reviewed and stable  Pain management: adequate  Airway patency: patent    PONV status at discharge: No PONV  Anesthetic complications: no      Cardiovascular status: blood pressure returned to baseline  Respiratory status: unassisted  Follow-up not needed.              Vitals Value Taken Time   /72 01/24/24 1111   Temp 36.6 °C (97.9 °F) 01/24/24 1024   Pulse 61 01/24/24 1115   Resp 29 01/24/24 1115   SpO2 99 % 01/24/24 1115   Vitals shown include unvalidated device data.      No case tracking events are documented in the log.      Pain/Michael Score: Pain Rating Prior to Med Admin: 6 (1/24/2024 11:13 AM)  Pain Rating Post Med Admin: 4 (1/24/2024 10:50 AM)  Michael Score: 10 (1/24/2024 11:00 AM)

## 2024-01-24 NOTE — SUBJECTIVE & OBJECTIVE
Interval History: NAEON. AFVSS. Patient continuing to endorse abdominal pain, requiring frequent opiates, as well as continued high ileostomy output. She went for repeat Ileoscopy today and was only significant for one non-bleeding ulcer 19 cm proximal from the stoma. Transitioned steroids to a steroid taper starting tomorrow at 40 mg daily for 5 days then will taper down 10 mg every 5 days. Will also discontinue IVF today and encourage PO intake as well as encouraging limiting opiate usage.     Review of Systems  Objective:     Vital Signs (Most Recent):  Temp: 97.9 °F (36.6 °C) (01/24/24 1137)  Pulse: 62 (01/24/24 1211)  Resp: 14 (01/24/24 1307)  BP: (!) 148/89 (01/24/24 1137)  SpO2: 98 % (01/24/24 1211) Vital Signs (24h Range):  Temp:  [97.7 °F (36.5 °C)-98.1 °F (36.7 °C)] 97.9 °F (36.6 °C)  Pulse:  [58-78] 62  Resp:  [14-21] 14  SpO2:  [96 %-100 %] 98 %  BP: (114-148)/(71-96) 148/89     Weight: 51.3 kg (113 lb 1.5 oz)  Body mass index is 21.37 kg/m².    Intake/Output Summary (Last 24 hours) at 1/24/2024 1503  Last data filed at 1/24/2024 1022  Gross per 24 hour   Intake 300 ml   Output --   Net 300 ml         Physical Exam  Vitals and nursing note reviewed.   Constitutional:       General: She is not in acute distress.     Appearance: Normal appearance. She is normal weight. She is not ill-appearing.   HENT:      Head: Normocephalic and atraumatic.      Mouth/Throat:      Mouth: Mucous membranes are moist.      Pharynx: Oropharynx is clear.   Eyes:      General: No scleral icterus.     Extraocular Movements: Extraocular movements intact.      Conjunctiva/sclera: Conjunctivae normal.      Pupils: Pupils are equal, round, and reactive to light.   Cardiovascular:      Rate and Rhythm: Normal rate and regular rhythm.      Pulses: Normal pulses.      Heart sounds: Normal heart sounds.   Pulmonary:      Effort: Pulmonary effort is normal. No respiratory distress.      Breath sounds: Normal breath sounds. No wheezing.    Abdominal:      General: Bowel sounds are normal. There is no distension.      Palpations: Abdomen is soft.      Tenderness: There is abdominal tenderness. There is no guarding or rebound.      Comments: Ileostomy bag in place, no erythema or signs of leakage. Site is clean, dry and intact   Musculoskeletal:         General: No tenderness.      Right lower leg: No edema.      Left lower leg: No edema.   Skin:     General: Skin is warm and dry.      Coloration: Skin is not pale.      Findings: No erythema or rash.   Neurological:      General: No focal deficit present.      Mental Status: She is alert and oriented to person, place, and time. Mental status is at baseline.      Cranial Nerves: No cranial nerve deficit.   Psychiatric:         Mood and Affect: Mood normal.         Behavior: Behavior normal.             Significant Labs: All pertinent labs within the past 24 hours have been reviewed.    Significant Imaging: I have reviewed all pertinent imaging results/findings within the past 24 hours.

## 2024-01-24 NOTE — [employer]OVATION PATIENT INSTRUCTIONS
Discharge Summary/Instructions after an Endoscopic Procedure  Patient Name: Ivy Salgado  Patient MRN: [id number]  Patient YOB: 1980 Wednesday, January 24, 2024  Lilian Navarro MD  Dear patient,  As a result of recent federal legislation (The Federal Cures Act), you may   receive lab or pathology results from your procedure in your [facility]   account before your physician is able to contact you. Your physician or   their representative will relay the results to you with their   recommendations at their soonest availability.  Thank you,  RESTRICTIONS:  During your procedure today, you received medications for sedation.  These   medications may affect your judgment, balance and coordination.  Therefore,   for 24 hours, you have the following restrictions:   - DO NOT drive a car, operate machinery, make legal/financial decisions,   sign important papers or drink alcohol.    ACTIVITY:  Today: no heavy lifting, straining or running due to procedural   sedation/anesthesia.  The following day: return to full activity including work.  DIET:  Eat and drink normally unless instructed otherwise.     TREATMENT FOR COMMON SIDE EFFECTS:  - Mild abdominal pain, nausea, belching, bloating or excessive gas:  rest,   eat lightly and use a heating pad.  - Sore Throat: treat with throat lozenges and/or gargle with warm salt   water.  - Because air was used during the procedure, expelling large amounts of air   from your rectum or belching is normal.  - If a bowel prep was taken, you may not have a bowel movement for 1-3 days.    This is normal.  SYMPTOMS TO WATCH FOR AND REPORT TO YOUR PHYSICIAN:  1. Abdominal pain or bloating, other than gas cramps.  2. Chest pain.  3. Back pain.  4. Signs of infection such as: chills or fever occurring within 24 hours   after the procedure.  5. Rectal bleeding, which would show as bright red, maroon, or black stools.   (A tablespoon of blood from the rectum is not serious, especially  if   hemorrhoids are present.)  6. Vomiting.  7. Weakness or dizziness.  GO DIRECTLY TO THE NEAREST EMERGENCY ROOM IF YOU HAVE ANY OF THE FOLLOWING:      Difficulty breathing              Chills and/or fever over 101 F   Persistent vomiting and/or vomiting blood   Severe abdominal pain   Severe chest pain   Black, tarry stools   Bleeding- more than one tablespoon   Any other symptom or condition that you feel may need urgent attention  Your doctor recommends these additional instructions:  If any biopsies were taken, your doctors clinic will contact you in 1 to 2   weeks with any results.  - [facility] patient to [facility] for [facility] care.   - Resume previous diet.   - Await pathology results.  For questions, problems or results please call your physician - Lilian Navarro MD at Work:  [phone].  [facility], EMERGENCY ROOM PHONE NUMBER: [phone]  IF A COMPLICATION OR EMERGENCY SITUATION ARISES AND YOU ARE UNABLE TO REACH   YOUR PHYSICIAN - GO DIRECTLY TO THE EMERGENCY ROOM.  Lilian Navarro MD  1/24/2024 10:20:06 AM  This report has been verified and signed electronically.  Dear patient,  As a result of recent federal legislation (The Federal Cures Act), you may   receive lab or pathology results from your procedure in your [facility]   account before your physician is able to contact you. Your physician or   their representative will relay the results to you with their   recommendations at their soonest availability.  Thank you,  PROVATION

## 2024-01-24 NOTE — ASSESSMENT & PLAN NOTE
43-year-old woman with PMH GERD, Crohn's s/p ileostomy presented to GI clinic with high ileostomy output 01/16 Recent [place] [facility] [place] with Crohn's flare.  Admission requested by GI (Dr. Peace Garcia) for IVF, ileostomy through stoma to see if Crohn's is worse, stool studies and treatment to get her output down.  Patient reports symptoms of nausea, abdominal pain, and high ileostomy output to be similar to past Crohn's flares.   Upon transfer, HDS and AF. CBC unremarkable. CMP unremarkable. ESR 65. Mg 1.2. Phos 5.3. . CRP 1.8    Plan:  - Consulted GI/IBD Team  - Repeat Ileoscopy completed 01/24 and only significant for one non-bleeding ulcer 19 cm proximal to the stoma  - F/u VIP, 5HIAA, Gastrin  - Stool Culture and Blood cultures with no growth to date  - C.Diff negative; E Coli Ag negative  - Transitioning steroids to a Prednisone taper starting at 40 mg daily for 5 days, then taper down 10 mg every 5 days.  - Discontinued IVF  - Anti-diarrheals  - Daily CBC, CMP, Mg, and Phos

## 2024-01-24 NOTE — SUBJECTIVE & OBJECTIVE
Interval History: NAEON. AFVSS. Continues to report abdominal pain and poor po intake. Gi planning on repeat ileoscopy tomorrow. NPO at [facility].     Review of Systems   Constitutional:  Positive for appetite change. Negative for chills, fever and unexpected weight change.   HENT:  Negative for rhinorrhea and sore throat.    Eyes:  Negative for visual disturbance.   Respiratory:  Negative for cough, choking, chest tightness and shortness of breath.    Cardiovascular:  Negative for chest pain, palpitations and leg swelling.   Gastrointestinal:  Positive for abdominal distention, abdominal pain and nausea. Negative for blood in stool and vomiting.   Genitourinary:  Positive for flank pain. Negative for dysuria, frequency and urgency.   Musculoskeletal:  Positive for arthralgias (recent shoulder surgery) and back pain.   Neurological:  Negative for weakness, light-headedness and headaches.   Psychiatric/Behavioral:  Negative for confusion and decreased concentration.      Objective:     Vital Signs (Most Recent):  Temp: 98 °F (36.7 °C) (01/23/24 1545)  Pulse: 61 (01/23/24 1926)  Resp: 17 (01/23/24 1755)  BP: 130/86 (01/23/24 1926)  SpO2: 99 % (01/23/24 1926) Vital Signs (24h Range):  Temp:  [97.4 °F (36.3 °C)-98.7 °F (37.1 °C)] 98 °F (36.7 °C)  Pulse:  [59-78] 61  Resp:  [14-18] 17  SpO2:  [96 %-99 %] 99 %  BP: (111-133)/(71-86) 130/86     Weight: 51.3 kg (113 lb 1.5 oz)  Body mass index is 21.37 kg/m².    Intake/Output Summary (Last 24 hours) at 1/23/2024 2000  Last data filed at 1/22/2024 2038  Gross per 24 hour   Intake 360 ml   Output 400 ml   Net -40 ml         Physical Exam  Vitals and nursing note reviewed.   Constitutional:       General: She is not in acute distress.     Appearance: Normal appearance. She is normal weight. She is not ill-appearing.   HENT:      Head: Normocephalic and atraumatic.      Mouth/Throat:      Mouth: Mucous membranes are moist.      Pharynx: Oropharynx is clear.   Eyes:      General: No  scleral icterus.     Extraocular Movements: Extraocular movements intact.      Conjunctiva/sclera: Conjunctivae normal.      Pupils: Pupils are equal, round, and reactive to light.   Cardiovascular:      Rate and Rhythm: Normal rate and regular rhythm.      Pulses: Normal pulses.      Heart sounds: Normal heart sounds.   Pulmonary:      Effort: Pulmonary effort is normal. No respiratory distress.      Breath sounds: Normal breath sounds. No wheezing.   Abdominal:      General: Bowel sounds are normal. There is no distension.      Palpations: Abdomen is soft.      Tenderness: There is abdominal tenderness. There is no guarding or rebound.      Comments: Ileostomy bag in place, no erythema or signs of leakage. Site is clean, dry and intact   Musculoskeletal:         General: No tenderness.      Right lower leg: No edema.      Left lower leg: No edema.   Skin:     General: Skin is warm and dry.      Coloration: Skin is not pale.      Findings: No erythema or rash.   Neurological:      General: No focal deficit present.      Mental Status: She is alert and oriented to person, place, and time. Mental status is at baseline.      Cranial Nerves: No cranial nerve deficit.   Psychiatric:         Mood and Affect: Mood normal.         Behavior: Behavior normal.             Significant Labs: All pertinent labs within the past 24 hours have been reviewed.  CBC:   Recent Labs   Lab 01/22/24  0433 01/23/24  0302   WBC 10.57 11.04   HGB 10.1* 10.4*   HCT 31.8* 33.0*    398     CMP:   Recent Labs   Lab 01/22/24  0433 01/23/24  0302    141   K 4.2 3.9    109   CO2 25 23   * 135*   BUN 15 17   CREATININE 0.6 0.8   CALCIUM 8.9 9.0   PROT 6.8 7.1   ALBUMIN 3.1* 3.2*   BILITOT 0.1 0.2   ALKPHOS 116 114   AST 9* 9*   ALT 10 10   ANIONGAP 5* 9       Significant Imaging: I have reviewed all pertinent imaging results/findings within the past 24 hours.

## 2024-01-24 NOTE — PROGRESS NOTES
[facility] - Med Surg  Adult Nutrition  Progress Note    SUMMARY       Recommendations    1. ADAT to Regular diet, increase fiberous foods to add bulk to stools (gradually). High calorie, high-protein food sources encouraged.   2. IV fluids as needed.   3. Encourage calcium, vitamin D and omega 3 food sources, B-complex vitamin supplementation recommended.   4. Recommend nutrition supplementation for additional calories/protein (plant-based/clear may be better tolerated.)   5. RD following.    Goals: Meet % EEN/EPN by follow-up date.  Nutrition Goal Status: new  Communication of RD Recs: other (comment) (POC)    Assessment and Plan    Nutrition Problem  Inadequate oral intake    Related to (etiology):   Decreased ability to consume sufficient energy    Signs and Symptoms (as evidenced by):   NPO status     Interventions/Recommendations (treatment strategy):  Collaboration of nutrition care with other providers    Nutrition Diagnosis Status:   New        Reason for Assessment    Reason For Assessment: length of stay  Diagnosis: gastrointestinal disease (Crohn's disease of both small and large intestines)  Relevant Medical History: crohn's disease, HTN, s/p ileostomy  Interdisciplinary Rounds: did not attend  General Information Comments: LOS. RD spoke with patient at bedside notes 4 smoothies drasnk per week (muscle punch with peanut butter). RD encourages calcium, zinc, vitamin D sources. Supplementation of omega 3, folic acid, B12 and thiamine would be beneficial in addition to MVI. -110 lbs. Pt appears nourished, small frame. NPO. At risk for malnutrition  for possible malaborption MCT or short chain fatty acid may be easier for the GI to absorb. Avoid irritants as much as possible. RD encourages protein drinks, if lactose not tolerated well there are plant based options and clear versions. RD discussed that during times of higher output or heavy sweating, you need to drink more fluids. Watch for signs  "and symptoms of fluid-electrolyte imbalance.  Nutrition Discharge Planning: Pending Clinical Course    Nutrition Risk Screen    Nutrition Risk Screen: no indicators present    Nutrition/Diet History    Patient Reported Diet/Restrictions/Preferences: general  Typical Food/Fluid Intake: 2 meals/ days, Smoothies 4x/ week  Spiritual, Cultural Beliefs, Congregation Practices, Values that Affect Care: no  Food Allergies: NKFA  Factors Affecting Nutritional Intake: altered gastrointestinal function    Anthropometrics    Temp: 97.9 °F (36.6 °C)  Height: 5' 1" (154.9 cm)  Height (inches): 61 in  Weight Method: Standard Scale  Weight: 51.3 kg (113 lb 1.5 oz)  Weight (lb): 113.1 lb  Ideal Body Weight (IBW), Female: 105 lb  % Ideal Body Weight, Female (lb): 107.71 %  BMI (Calculated): 21.4       Lab/Procedures/Meds    Pertinent Labs Reviewed: reviewed  Pertinent Labs Comments: CO2 21, Glu 130, AST 9  Pertinent Medications Reviewed: reviewed  Pertinent Medications Comments: dronabinol, enoxaparin, gabapentin, loperamide, methylprednisolone, MVI, mirtazapine, pantoprazole, lactated ringers      Estimated/Assessed Needs    Weight Used For Calorie Calculations: 51.3 kg (113 lb)  Energy Calorie Requirements (kcal): 6249-5633 kcal/d (25-30 kcal/kg)  Energy Need Method: Kcal/kg  Protein Requirements: 51-77 g/d (1.0-1.5 g/kg)  Weight Used For Protein Calculations: 51.3 kg (113 lb)        RDA Method (mL): 1281         Nutrition Prescription Ordered    Current Diet Order: NPO    Evaluation of Received Nutrient/Fluid Intake    I/O: -6.62  Comments: LBM: 1/24  % Intake of Estimated Energy Needs: 75 - 100 %  % Meal Intake: NPO    Nutrition Risk    Level of Risk/Frequency of Follow-up: moderate - high (1-2x/ week)     Monitor and Evaluation    Food and Nutrient Intake: energy intake, food and beverage intake, enteral nutrition intake, parenteral nutrition intake  Food and Nutrient Adminstration: diet order, enteral and parenteral nutrition " administration  Physical Activity and Function: nutrition-related ADLs and IADLs, factors affecting access to physical activity  Anthropometric Measurements: body mass index, weight change, weight  Biochemical Data, Medical Tests and Procedures: lipid profile, inflammatory profile, glucose/endocrine profile, gastrointestinal profile, electrolyte and renal panel  Nutrition-Focused Physical Findings: skin, head and eyes, extremities, muscles and bones, overall appearance     Nutrition Follow-Up    RD Follow-up?: Yes

## 2024-01-24 NOTE — PROGRESS NOTES
[facility] (Inflammatory Bowel Disease) Progress Note:    Reason for Consult: Crohn's disease w/high ileostomy output    Brief History:  Ivy Salgado is a 43 y.o. female for whom GI is consulted for IBD management. 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 GI/IBD consulted for further management of high ostomy output.     She was recently admitted from 12/31 to 1/07 with high ileostomy output and liver enzyme elevation; stool infectious workup negative. She underwent EGD with mild gastritis and ileoscopy with 6 apthous ulcers in the distal ileum. She had some dilation of the bile ducts in the central right hepatic lobe. MRCP showed punctate cystic focus in the pancreatic tail, likely a side IPMN. Prior to discharge, she did have an increase in her ostomy output which improved with scheduled imodium. Over the past 4 to 5 days, she has had an increase in her ileostomy output, which she notes as green, nonbloody. She has been unable to keep up with her fluid intake and has not had an appetite. Associated symptoms include persistent right sided abdominal pain. She was seen in IBD clinic on 1/16 with recommendations for hospital admission. Denies fevers, chills, vomiting, bloody stools. On arrival, she was afebrile and HDS. On labs on admission. She was admitted to Hospital Medicine further workup.     Interval History:  - no charted output but per patient about 800-900 thus far (last night to present)  - abdominal pain  overnight  - ileoscopy completed this AM - 1 ulcer seen  - LFTs WNL  - Solumedrol 20mg TID - day 6    IBD History      Current IBD Meds: Entyvio pending restart  Current GI Meds: imodium liquid    Review of Systems   Constitutional:  Positive for malaise/fatigue. Negative for chills, fever and weight loss.   HENT:          No oral ulcers, dysphagia, oral thrush   Eyes:  Negative for blurred vision, pain and redness.   Respiratory:  Negative for cough and shortness of breath.    Cardiovascular:  Negative for chest pain.   Gastrointestinal:  Positive for abdominal pain and diarrhea. Negative for heartburn, nausea and vomiting.   Genitourinary:  Negative for dysuria and hematuria.   Musculoskeletal:  Negative for back pain and joint pain.   Skin:  Negative for rash.   Psychiatric/Behavioral:  Negative for depression. The patient is not nervous/anxious and does not have insomnia.      Prior Pertinent Surgeries:   - Unclear, colon resection to address fistulous disease process  - Unclear, colon resection to address abscess   - S/p total proctocolectomy with end ileostomy, post-op course complicated by perineal wound which she followed with CRS for ~ 1 year (Dr. 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  2017- Ileoscopy- The examined portion of the ileum was normal.   Biopsied. Widely patent end ileostomy with healthy appearing mucosa at the ileostomy. Pathology showed fragments of unremarkable small bowel mucosa. No evidence of active colitis, granuloma, dysplasia or malignancy  10/2016- EGD- Normal esophagus. Small hiatus hernia. Erythematous mucosa in the gastric body. Biopsied.  Trace of hematin in the gastric body.   Normal examined duodenum.  Two biopsies were obtained in the duodenal bulb. Four biopsies were obtained in the 2nd part of the duodenum.   10/2016- Ileoscopy- The examined portion of the ileum was normal. Biopsies without any acute abnormalities- CMV, HSV-1 AND HSV-2 stains performed. No celiac. No. H pylori. Normal mucosa of the small bowel.   10/2014- EGD- Normal Study. Biopsied to r/o Crohn's.    10/2014- Ileoscopy- The examined portion of the ileum was normal. Biopsies normal.  2/2014- SBE- A single (solitary) ulcer in the proximal ileum. Biopsied. Mild non-specific enteritis.   9/2013- Ileoscopy- Congested, eroded and ulcerated mucosa in the area at 5 cm proximal to the stoma. Biopsied. The examined portion of the ileum was normal otherwise up to 30 cm. Biopsied. FRAGMENTS OF NONNEOPLASTIC SMALL INTESTINAL MUCOSA WITH NO ACTIVE INFLAMMATION, ULCERATION OR DYSPLASIA PRESENT. THERE IS PRESERVATION OF THE VILLOUS ARCHITECTURE. FRAGMENTS OF NONNEOPLASTIC SMALL INTESTINAL MUCOSA WITH NO ACTIVE INFLAMMATION, ULCERATION OR DYSPLASIA IDENTIFIED.  9/2013- EGD- Normal esophagus. Z-line regular, 36 cm from the incisors. A single gastric polyp. Resected and retrieved. A small amount of food (residue) in the stomach. Removal was successful. Mucosal abnormality in the duodenum. Biopsied. Mild chronic gastritis but otherwise negative.   5/2012- EGD- Normal Study.   5/2012- Colonoscopy- Crohn's colitis mostly involving the distal 30 cm. This was biopsied to r/o CMV, HSV. Mild ileocolonic anastomosis Crohn's disease. Chronic active colitis with surface ulceration, cryptitis, crypt abscess and reactive changes. Negative for CMV.  Therapeutic Drug Monitoring Labs:    Prior IBD Therapies:  Entyvio/Vedolizumab- most recent therapy, was on this from 0937-0329 and then stopped as she was getting recurrent UTI's/pyelonephritis   Stelara/Ustekinumab- was on this prior to Entyvio and had multiple  "infections   Cimzia/Certolizumab- stopped working after some years, unclear on dates, records note she was on this in 2016   Remicade/Inflixmab- stopped working after many years, unsure about antibodies   Humira/Adalimumab- Suspected drug-induced lupus due to joint pains  Prednisone tapers- effective, caused avascular necrosis to her left shoulder requiring arthroplasty   Imuran/Azathioprine- stopped due to drug-induced pancreatitis   Methotrexate- stopped due to significant leukopenia  Sulfasalazine at some point prior to 2012  Entocort- at some point prior to 2012   Canasa- at some point prior to 2012    Inpatient Medications:     droNABinol  5 mg Oral BID AC    enoxparin  40 mg Subcutaneous Q24H (prophylaxis, 1700)    gabapentin  300 mg Oral BID    loperamide  4 mg Oral QID    methylPREDNISolone sodium succinate injection  20 mg Intravenous TID    mirtazapine  30 mg Oral Nightly    multivitamin  1 tablet Oral Daily    nadoloL  40 mg Oral Daily    pantoprazole  40 mg Oral Daily       Vital Signs:  BP (!) 143/81   Pulse 65   Temp 97.9 °F (36.6 °C) (Temporal)   Resp 15   Ht 5' 1" (1.549 m)   Wt 51.3 kg (113 lb 1.5 oz)   LMP 03/30/2015   SpO2 98%   Breastfeeding No   BMI 21.37 kg/m²      Physical Exam  Vitals and nursing note reviewed.   Constitutional:       General: She is in acute distress (upon first AM assessment).      Appearance: She is ill-appearing.   Cardiovascular:      Rate and Rhythm: Normal rate.      Pulses: Normal pulses.      Heart sounds: No murmur heard.  Pulmonary:      Effort: Pulmonary effort is normal. No respiratory distress.   Abdominal:      General: Bowel sounds are normal. There is no distension.      Palpations: Abdomen is soft.      Tenderness: There is abdominal tenderness.   Skin:     General: Skin is warm and dry.      Capillary Refill: Capillary refill takes 2 to 3 seconds.   Neurological:      Mental Status: She is alert and oriented to person, place, and time. "         Labs:   Lab Results   Component Value Date    WBC 9.29 01/24/2024    HGB 10.1 (L) 01/24/2024    HCT 31.8 (L) 01/24/2024    MCV 89 01/24/2024     01/24/2024     Lab Results   Component Value Date    CREATININE 0.7 01/24/2024    ALBUMIN 3.0 (L) 01/24/2024    BILITOT 0.2 01/24/2024    ALKPHOS 93 01/24/2024    AST 9 (L) 01/24/2024    ALT 10 01/24/2024     Lab Results   Component Value Date    CRP 3.3 01/21/2024    CALPROTECTIN 93.9 (H) 01/16/2024     Lab Results   Component Value Date    HEPBSAG Non-reactive 09/19/2023    HEPBCAB Non-reactive 01/02/2024     Lab Results   Component Value Date    TBGOLDPLUS Negative 01/02/2024     Lab Results   Component Value Date    TORFNBUY84QH 21 (L) 06/13/2019    EOBNQFBB28 278 01/02/2024       Microbiology Results (last 7 days)       Procedure Component Value Units Date/Time    Blood culture [[id number]] Collected: 01/18/24 1659    Order Status: Completed Specimen: Blood Updated: 01/23/24 2012     Blood Culture, Routine No growth after 5 days.    Blood culture [[id number]] Collected: 01/18/24 1659    Order Status: Completed Specimen: Blood Updated: 01/23/24 2012     Blood Culture, Routine No growth after 5 days.    Stool culture [[id number]] Collected: 01/16/24 1332    Order Status: Completed Specimen: Stool Updated: 01/19/24 1057     Stool Culture No Salmonella,Shigella,Vibrio,Campylobacter,Yersinia isolated.    E. coli 0157 antigen [[id number]] Collected: 01/16/24 1332    Order Status: Completed Specimen: Stool Updated: 01/18/24 1152     Shiga Toxin 1 E.coli Negative     Shiga Toxin 2 E.coli Negative             Impression:  Ivy Salgado is a 43 y.o. female for whom GI is consulted for IBD management. She has a a medical history significant for ARPITA/Depression, GERD, Long QTc syndrome (Type III, on nadolol), s/p SHELLIE (2015) for recurrent ovarian cysts and endometriosis, history of melanoma in situ (buttocks and para-stomal site, excised in 2018 and 2019  respectively), drug induced pancreatitis from Imuran and Crohn's disease with small and large intestine involvement (diagnosed in 1990; terminal ileum involvement per patient) s/p total proctocolectomy with end ileostomy (6/2012) currently off all therapies since 2019. GI/IBD consulted for further management of high ostomy output.     Since her admit, her stool studies have been unremarkable. Her output continues to be high >1.5L. We increased her imodium to max dose 4 mg 4x a day in order to try to decrease her output. We also added PRN low dose liquid lomotil to assist with continued increased output if needed. She has not taken the PRN lomotil despite having continued high output. She continues on IVFs to assist with fluid replacement lost from ostomy output and her labs continue to be stable despite high output. This morning solumedrol 60 mg was started to try to temper her symptoms which was given.     GI Pathogens panel negative and stool calprotectin significantly decreased since 12/31. Patient continues with high output and R sided abdominal pain despite steroids. Today we repeated the ileoscopy which showed improvement and 1 ulcer at 19 cm. I have low suspicion that this is causing her abdominal pain and thus I believe we should begin to wean the IVFs to encourage PO intake and limit use of pain medications - potentially even having an oral medication available before use of IV morphine. Can trial lotronex - but its not on formulary so would need to obtain outpatient for use in the inpatient setting. Patient reports trying octreotide in the past and it making her constipated and having history of prolonged QTC interval limits use of other medications.     Crohn's Disease  High ileostomy output  Acute abdominal pain     Plan:  - Okay to resume diet  - Continue Solumedrol 20 mg TID - last day today then   - Prednisone 40 mg x 5 days   - Prednisone 30 mg x 5 days   - Pred 20 mg x 5 days   - Pred 10 mg x 5  days   - Stop    - At discharge - ambulatory referral to Pain Management and [facility]    - F/U with Dr Garcia in IBD clinic - we will arrange  - Continue liquid imodium to 4 mg 4 x a day  - PRN lomotil 5ml 4 x day for continued increased output  - Can consider trial of lotronex - but would need to be obtained from outpatient pharmacy to use in the inpatient setting- will do this depending on her output tomorrow  - Stop IVFs and monitor electrolytes before discharge to be sure she is not getting dehydrated  - will quantitate ileostomy output  - Encourage limiting of pain medication since scope looks improved   - oral pain medication is okay to try to limit the use of IV   - Avoid NSAIDs since this may exacerbate IBD  - DVT prophylaxis (IBD pts increased risk of VTE) - Lovenox 40 mg QD  - Opiates- if necessary, IV morphine is preferred due to short acting nature      Shelby Zhong NP   Department of Gastroenterology  Inflammatory Bowel Disease

## 2024-01-24 NOTE — PROGRESS NOTES
[facility]  Progress Note    Patient Name: Ivy Salgado  MRN: [id number]  Patient Class: IP- Inpatient   Admission Date: 1/16/2024  Length of Stay: 8 days  Attending Physician: Philippe Lee, *  Primary Care Provider: Luis Madden DO        Subjective:     Principal Problem:Crohn's disease of both small and large intestine        HPI:  Patient is a 44 yo female with PMH of Crohn's disease s/p ileostomy, GERD, and ARPITA who presented from GI clinic at the request for admission from Dr. Peace Garcia for ongoing high ileostomy output. Of note, she was recently hospitalized 12/31-01/07 for similar presentation. She is reporting generalized 8/10 abdominal pain, nausea, high ileostomy output, decreased appetite,and heartburn. She states her symptoms are similar the symptoms she used to have with Crohn's flares in the past, but she reports she hasn't had a flare since 2020. She reports normally emptying her ileostomy bag 2-3 times after meals, but in the last few weeks, she has had to continuously empty the bag as high as 12 times even when not eating meals. Her appetite has been decreased, reporting only eating half of her normal intake. During her recent admission, she had a formal workup with EGD/ileoscopy and stool studies that were largely unrevealing aside from multiple ulcers in the prepyloric region. She denies fever, chills, chest pain, SOB, palpitations, headache, vision changes, skin changes, vomiting, urinary symptoms, blood in stool, weight loss, or weakness.     She arrived to [facility] as a direct admission with plans to be started on IVF, anti-diarrheals, pain medications, and plans for stool studies and repeat ileoscopy. She was admitted to hospital medicine for symptomatic care and management of high ileostomy output and potential Crohn's flare with consult to IBD team.    Overview/Hospital Course:  Patient was admitted as a direct admit from GI clinic due to high  ileostomy output. She was treated with continuous IVF, anti-diarrheals, and pain medications as needed. GI/IBD were consulted, recommended continued supportive treatment and IV Solumedrol. Initially there were no plans for repeat ileoscopy as she recently completed one during last admission, biopsy from which showed active enteritis with ulceration, however patient with continued pain and high output. Ileoscopy completed on 01/24 and was significant only for a solitary ulcer 19 cm proximal to the stoma. CDiff studies negative, E. Coli Ag negative, and Stool culture with no growth. Steroids were transitioned to Prednisone 40 mg daily and then to be tapered down.     Interval History: NAEON. AFVSS. Patient continuing to endorse abdominal pain, requiring frequent opiates, as well as continued high ileostomy output. She went for repeat Ileoscopy today and was only significant for one non-bleeding ulcer 19 cm proximal from the stoma. Transitioned steroids to a steroid taper starting tomorrow at 40 mg daily for 5 days then will taper down 10 mg every 5 days. Will also discontinue IVF today and encourage PO intake as well as encouraging limiting opiate usage.     Review of Systems  Objective:     Vital Signs (Most Recent):  Temp: 97.9 °F (36.6 °C) (01/24/24 1137)  Pulse: 62 (01/24/24 1211)  Resp: 14 (01/24/24 1307)  BP: (!) 148/89 (01/24/24 1137)  SpO2: 98 % (01/24/24 1211) Vital Signs (24h Range):  Temp:  [97.7 °F (36.5 °C)-98.1 °F (36.7 °C)] 97.9 °F (36.6 °C)  Pulse:  [58-78] 62  Resp:  [14-21] 14  SpO2:  [96 %-100 %] 98 %  BP: (114-148)/(71-96) 148/89     Weight: 51.3 kg (113 lb 1.5 oz)  Body mass index is 21.37 kg/m².    Intake/Output Summary (Last 24 hours) at 1/24/2024 1503  Last data filed at 1/24/2024 1022  Gross per 24 hour   Intake 300 ml   Output --   Net 300 ml         Physical Exam  Vitals and nursing note reviewed.   Constitutional:       General: She is not in acute distress.     Appearance: Normal  appearance. She is normal weight. She is not ill-appearing.   HENT:      Head: Normocephalic and atraumatic.      Mouth/Throat:      Mouth: Mucous membranes are moist.      Pharynx: Oropharynx is clear.   Eyes:      General: No scleral icterus.     Extraocular Movements: Extraocular movements intact.      Conjunctiva/sclera: Conjunctivae normal.      Pupils: Pupils are equal, round, and reactive to light.   Cardiovascular:      Rate and Rhythm: Normal rate and regular rhythm.      Pulses: Normal pulses.      Heart sounds: Normal heart sounds.   Pulmonary:      Effort: Pulmonary effort is normal. No respiratory distress.      Breath sounds: Normal breath sounds. No wheezing.   Abdominal:      General: Bowel sounds are normal. There is no distension.      Palpations: Abdomen is soft.      Tenderness: There is abdominal tenderness. There is no guarding or rebound.      Comments: Ileostomy bag in place, no erythema or signs of leakage. Site is clean, dry and intact   Musculoskeletal:         General: No tenderness.      Right lower leg: No edema.      Left lower leg: No edema.   Skin:     General: Skin is warm and dry.      Coloration: Skin is not pale.      Findings: No erythema or rash.   Neurological:      General: No focal deficit present.      Mental Status: She is alert and oriented to person, place, and time. Mental status is at baseline.      Cranial Nerves: No cranial nerve deficit.   Psychiatric:         Mood and Affect: Mood normal.         Behavior: Behavior normal.             Significant Labs: All pertinent labs within the past 24 hours have been reviewed.    Significant Imaging: I have reviewed all pertinent imaging results/findings within the past 24 hours.    Assessment/Plan:      * Crohn's disease of both small and large intestine  43-year-old woman with PMH GERD, Crohn's s/p ileostomy presented to GI clinic with high ileostomy output 01/16 Recent admission [place] [facility] [place]12/31-1/7) with Crohn's flare.   Admission requested by GI (Dr. Peace Garcia) for IVF, ileostomy through stoma to see if Crohn's is worse, stool studies and treatment to get her output down.  Patient reports symptoms of nausea, abdominal pain, and high ileostomy output to be similar to past Crohn's flares.   Upon transfer, HDS and AF. CBC unremarkable. CMP unremarkable. ESR 65. Mg 1.2. Phos 5.3. . CRP 1.8    Plan:  - Consulted GI/IBD Team  - Repeat Ileoscopy completed 01/24 and only significant for one non-bleeding ulcer 19 cm proximal to the stoma  - F/u VIP, 5HIAA, Gastrin  - Stool Culture and Blood cultures with no growth to date  - C.Diff negative; E Coli Ag negative  - Transitioning steroids to a Prednisone taper starting at 40 mg daily for 5 days, then taper down 10 mg every 5 days.  - Discontinued IVF  - Anti-diarrheals  - Daily CBC, CMP, Mg, and Phos    Gastroesophageal reflux disease  - Protonix 40 mg BID      Nausea  - Phenergan 25mg q6h PRN first choice  - Compazine 2.5mg q6h PRN second choice    High output ileostomy  Patient reports normally emptying her ileostomy 2-3x following each meal at baseline; on admission, she reports having to empty her 12-15x per day.    - Anti-diarrheals scheduled  - Discontinued IVF; encouraging increased PO intake  - Replete electrolytes as needed  - Continue regular diet    Abdominal pain  - Morphine 4mg IV q4h prn severe pain  - Morphine 2mg IV q4h breakthrough pain   - CTAP due to acute worsening pain (01/19); negative for acute process, however was done without oral contrast. Consider repeating CTAP with oral contrast    Generalized anxiety disorder    - Restarted home mirtazapine.     S/P ileostomy  Ileostomy placed as a result of total proctocolectomy due to fistulizing Crohn's disease.        VTE Risk Mitigation (From admission, onward)           Ordered     enoxaparin injection 40 mg  Every 24 hours         01/16/24 3335     Reason for No Pharmacological VTE Prophylaxis  Once         Question:  Reasons:  Answer:  Risk of Bleeding    01/16/24 1335     IP VTE HIGH RISK PATIENT  Once         01/16/24 1335     Place sequential compression device  Until discontinued         01/16/24 1335                    Discharge Planning   CHRISTOPH: 1/26/2024     Code Status: Full Code   Is the patient medically ready for discharge?: No    Reason for patient still in hospital (select all that apply): Patient trending condition  Discharge Plan A: Home with family   Discharge Delays: None known at this time              Lb Meadows DO  Department of Hospital Medicine   [facility]

## 2024-01-24 NOTE — TRANSFER OF CARE
"Anesthesia Transfer of Care Note    Patient: Ivy Salgado    Procedure(s) Performed: Procedure(s) (LRB):  ILEOSCOPY (N/A)    Patient location: PACU    Anesthesia Type: general    Transport from OR: Transported from OR on room air with adequate spontaneous ventilation    Post pain: pain needs to be addressed    Post assessment: no apparent anesthetic complications and tolerated procedure well    Post vital signs: stable    Level of consciousness: awake and alert    Nausea/Vomiting: no nausea/vomiting    Complications: none    Transfer of care protocol was followed      Last vitals: Visit Vitals  /86   Pulse 66   Temp 36.6 °C (97.9 °F) (Temporal)   Resp 15   Ht 5' 1" (1.549 m)   Wt 51.3 kg (113 lb 1.5 oz)   LMP 03/30/2015   SpO2 98%   Breastfeeding No   BMI 21.37 kg/m²     "

## 2024-01-24 NOTE — ANESTHESIA PREPROCEDURE EVALUATION
01/24/2024  Pre-operative evaluation for Procedure(s) (LRB):  ILEOSCOPY (N/A)    Ivy Salgado is a 43 y.o. female     Patient Active Problem List   Diagnosis    S/P ileostomy    Crohn's disease of small intestine    Generalized anxiety disorder    Herpes genitalis in women    Joint pain    Fatigue    Abdominal pain    High output ileostomy    Crohn's disease of both small and large intestine    Appetite impaired    Nausea    Vitamin D deficiency    Palpitations    Multiple thyroid nodules    Urinary tract infection with hematuria    Osteopenia of multiple sites    Diarrhea    Gastroesophageal reflux disease    Poor venous access    Alkaline phosphatase elevation- based on lab from 4/9/2019     S/P ExLap, BSO, 5/30/19    Premature surgical menopause    History of recurrent UTI (urinary tract infection)    Pelvic pain in female    Left ureteral stone    Ureteral stone    Pain due to ureteral stent    History of prolonged Q-T interval on ECG    Pyelonephritis    Fall    Left shoulder pain    Right foot pain    Avascular necrosis of bone of shoulder    Biceps tendonitis on left    Decreased range of motion of left shoulder    Hematuria    Hypocalcemia    S/P shoulder surgery    Transaminitis    Elevated liver function tests       Review of patient's allergies indicates:   Allergen Reactions    Azathioprine sodium Other (See Comments)     Other reaction(s): pancreatitis  Other reaction(s): pancreatitis    Methotrexate analogues Other (See Comments)     leukopenia    Stelara [ustekinumab] Other (See Comments)     Multiple infections    Zofran [ondansetron hcl (pf)] Other (See Comments)     Per patient causes prolong QT    Vancomycin analogues Other (See Comments)     Made her red    Azathioprine      Other reaction(s): Unknown    Methotrexate      Other reaction(s): infection-    Morphine Itching and Other (See  Comments)     Other reaction(s): Itching    Zofran [ondansetron hcl]      Other reaction(s): Hives    Bactrim [sulfamethoxazole-trimethoprim] Palpitations    Ciprofloxacin Palpitations       No current facility-administered medications on file prior to encounter.     Current Outpatient Medications on File Prior to Encounter   Medication Sig Dispense Refill    clonazePAM (KLONOPIN) 1 MG tablet Take 1 tablet (1 mg total) by mouth 2 (two) times daily. (Patient taking differently: Take 1 mg by mouth 2 (two) times daily as needed for Anxiety.) 60 tablet 2    cyclobenzaprine (FLEXERIL) 10 MG tablet Take 1 tablet (10 mg total) by mouth every evening as needed for muscle pain 15 tablet 0    droNABinol (MARINOL) 5 MG capsule Take 1 capsule (5 mg total) by mouth 2 (two) times daily before meals. 60 capsule 1    flu vacc [id number] 6mos up,PF, (FLUARIX QUAD [id number], PF,) 60 mcg (15 mcg x 4)/0.5 mL Syrg inject into muscle for one dose 0.5 mL 0    fluconazole (DIFLUCAN) 150 MG Tab Take 1 tablet (150 mg total) by mouth every 72 hours as needed (vaginal yeast). 3 tablet 0    gabapentin (NEURONTIN) 300 MG capsule Take 1 capsule (300 mg total) by mouth 2 (two) times daily. 60 capsule 11    mirtazapine (REMERON SOL-TAB) 30 MG disintegrating tablet Take 1 tablet (30 mg total) by mouth nightly. 30 tablet 0    multivitamin (THERAGRAN) per tablet Take 1 tablet by mouth once daily.      nadoloL (CORGARD) 40 MG tablet Take 1 tablet (40 mg total) by mouth once daily. Patient needs appointment before next refill. 30 tablet 0    pantoprazole (PROTONIX) 40 MG tablet Take 1 tablet (40 mg total) by mouth 2 (two) times daily. 60 tablet 12    tamsulosin (FLOMAX) 0.4 mg Cap Take 1 capsule (0.4 mg total) by mouth once daily. 30 capsule 1    triamcinolone acetonide 0.1% (KENALOG) 0.1 % cream Apply topically 2 (two) times daily.      valACYclovir (VALTREX) 500 MG tablet Take 1 tablet (500 mg total) by mouth once daily. 90 tablet 3    vedolizumab  (ENTYVIO) 300 mg SolR injection Inject 300 mg into the vein.      zolpidem (AMBIEN) 10 mg Tab Take 1 tablet (10 mg total) by mouth every evening. 30 tablet 2       Past Surgical History:   Procedure Laterality Date    ABDOMINAL SURGERY      APPENDECTOMY      ARTHROPLASTY OF SHOULDER Left 7/18/2023    Procedure: ARTHROPLASTY, SHOULDER;  Surgeon: Sharon Babb MD;  Location: [facility] OR;  Service: Orthopedics;  Laterality: Left;    AUGMENTATION OF BREAST Bilateral 06/2022    gel implants    BILATERAL SALPINGO-OOPHORECTOMY (BSO) Bilateral 05/30/2019    Procedure: SALPINGO-OOPHORECTOMY, BILATERAL;  Surgeon: Rupa German MD;  [facility];  Service: OB/GYN;  Laterality: Bilateral;    BLADDER SURGERY      partial cystectomy due to fistula    breast lift      BREAST SURGERY      [facility]      COLON SURGERY      COLONOSCOPY      CYSTOSCOPY  09/23/2020    Procedure: CYSTOSCOPY;  Surgeon: Sascha Florentino MD;  [facility];  Service: Urology;;    CYSTOSCOPY W/ URETERAL STENT PLACEMENT Left 09/15/2020    Procedure: CYSTOSCOPY, WITH URETERAL STENT INSERTION;  Surgeon: Sascha Florentino MD;  [facility];  Service: Urology;  Laterality: Left;    DIAGNOSTIC LAPAROSCOPY N/A 07/09/2020    Procedure: LAPAROSCOPY, DIAGNOSTIC;  Surgeon: Gilson El MD;  Location: [facility] OR;  Service: OB/GYN;  Laterality: N/A;    ESOPHAGOGASTRODUODENOSCOPY N/A 1/3/2024    Procedure: EGD (ESOPHAGOGASTRODUODENOSCOPY);  Surgeon: Lily Lind MD;  Location: [facility] (2ND FLR);  Service: Endoscopy;  Laterality: N/A;    EXCISION OF MELANOMA  07/17/2019    ILEOSCOPY N/A 1/3/2024    Procedure: ILEOSCOPY;  Surgeon: Lily Lind MD;  Location: [facility] ENDO (2ND FLR);  Service: Endoscopy;  Laterality: N/A;    ILEOSTOMY      LAPAROSCOPIC LYSIS OF ADHESIONS N/A 07/09/2020    Procedure: LYSIS, ADHESIONS, LAPAROSCOPIC;  Surgeon: Gilson El MD;  Location: [facility] OR;  Service: OB/GYN;  Laterality: N/A;    LASER  LITHOTRIPSY  09/23/2020    Procedure: LITHOTRIPSY, USING LASER;  Surgeon: Sascha Florentino MD;  Location: [facility] OR 1ST FLR;  Service: Urology;;    LYSIS OF ADHESIONS N/A 05/30/2019    Procedure: LYSIS, ADHESIONS;  Surgeon: Rupa German MD;  Location: [facility] OR 2ND FLR;  Service: OB/GYN;  Laterality: N/A;    OOPHORECTOMY Right 04/16/2015    PORTACATH PLACEMENT  02/21/2017    SKIN BIOPSY      SMALL INTESTINE SURGERY      age 18 Y    TOTAL ABDOMINAL HYSTERECTOMY  04/16/2015    TOTAL COLECTOMY      TUBAL LIGATION  06/06/2012    UPPER GASTROINTESTINAL ENDOSCOPY      URETEROSCOPIC REMOVAL OF URETERIC CALCULUS  09/23/2020    Procedure: REMOVAL, CALCULUS, URETER, URETEROSCOPIC;  Surgeon: Sascha Florentino MD;  Location: [facility] OR 1ST FLR;  Service: Urology;;    URETEROSCOPY Left 09/23/2020    Procedure: URETEROSCOPY;  Surgeon: Sascha Florentino MD;  [facility];  Service: Urology;  Laterality: Left;       Social History     Socioeconomic History    Marital status: Single   Occupational History     Employer: [employer]   Tobacco Use    Smoking status: Never    Smokeless tobacco: Never   Substance and Sexual Activity    Alcohol use: Not Currently     Alcohol/week: 0.0 standard drinks of alcohol    Drug use: No    Sexual activity: Yes     Partners: Male     Birth control/protection: See Surgical Hx     Comment: HYST   Other Topics Concern    Are you pregnant or think you may be? No    Breast-feeding No     Social Determinants of Health     Financial Resource Strain: Low Risk  (1/17/2024)    Overall Financial Resource Strain (CARDIA)     Difficulty of Paying Living Expenses: Not hard at all   Recent Concern: Financial Resource Strain - High Risk (10/24/2023)    Overall Financial Resource Strain (CARDIA)     Difficulty of Paying Living Expenses: Hard   Food Insecurity: No Food Insecurity (1/17/2024)    Hunger Vital Sign     Worried About Running Out of Food in the Last Year: Never true      Ran Out of Food in the Last Year: Never true   Recent Concern: Food Insecurity - Food Insecurity Present (10/24/2023)    Hunger Vital Sign     Worried About Running Out of Food in the Last Year: Sometimes true     Ran Out of Food in the Last Year: Sometimes true   Transportation Needs: No Transportation Needs (1/17/2024)    PRAPARE - Transportation     Lack of Transportation (Medical): No     Lack of Transportation (Non-Medical): No   Recent Concern: Transportation Needs - Unmet Transportation Needs (10/24/2023)    PRAPARE - Transportation     Lack of Transportation (Medical): Yes     Lack of Transportation (Non-Medical): Yes   Physical Activity: Insufficiently Active (1/17/2024)    Exercise Vital Sign     Days of Exercise per Week: 3 days     Minutes of Exercise per Session: 30 min   Stress: No Stress Concern Present (1/17/2024)    [employer] - Occupational Stress Questionnaire     Feeling of Stress : Not at all   Recent Concern: Stress - Stress Concern Present (10/24/2023)    [employer] - Occupational Stress Questionnaire     Feeling of Stress : Rather much   Social Connections: Unknown (1/17/2024)    Social Connection and Isolation Panel [NHANES]     Frequency of Communication with Friends and Family: More than three times a week     Frequency of Social Gatherings with Friends and Family: More than three times a week     Attends Temple Services: Patient declined     Active Member of Clubs or Organizations: Patient declined     Attends Club or Organization Meetings: Patient declined     Marital Status: Never    Housing Stability: Low Risk  (1/17/2024)    Housing Stability Vital Sign     Unable to Pay for Housing in the Last Year: No     Number of Places Lived in the Last Year: 1     Unstable Housing in the Last Year: No   Recent Concern: Housing Stability - High Risk (10/24/2023)    Housing Stability Vital Sign     Unable to Pay for Housing in  the Last Year: Yes     Number of Places Lived in the Last Year: 2     Unstable Housing in the Last Year: No         CBC:   Recent Labs     01/23/24  0302 01/24/24  0416   WBC 11.04 9.29   RBC 3.63* 3.56*   HGB 10.4* 10.1*   HCT 33.0* 31.8*    393   MCV 91 89   MCH 28.7 28.4   MCHC 31.5* 31.8*       CMP:   Recent Labs     01/23/24  0302 01/24/24  0416    142   K 3.9 4.3    110   CO2 23 21*   BUN 17 14   CREATININE 0.8 0.7   * 130*   MG 2.0 1.9   PHOS 3.8 3.9   CALCIUM 9.0 8.8   ALBUMIN 3.2* 3.0*   PROT 7.1 6.6   ALKPHOS 114 93   ALT 10 10   AST 9* 9*   BILITOT 0.2 0.2             Pre-op Assessment    I have reviewed the Patient Summary Reports.     I have reviewed the Nursing Notes. I have reviewed the NPO Status.      Review of Systems  Anesthesia Hx:  No problems with previous Anesthesia                Cardiovascular:     Hypertension                                        Renal/:   renal calculi               Hepatic/GI:     GERD             Psych:   anxiety                 Physical Exam    Airway:  Mallampati: II   Mouth Opening: Normal  Tongue: Normal    Chest/Lungs:  Normal Respiratory Rate    Heart:  Rhythm: Regular Rhythm        Anesthesia Plan  Type of Anesthesia, risks & benefits discussed:    Anesthesia Type: Gen Natural Airway  Intra-op Monitoring Plan: Standard ASA Monitors  Induction:  IV  Informed Consent: Informed consent signed with the Patient and all parties understand the risks and agree with anesthesia plan.  All questions answered.   ASA Score: 2    Ready For Surgery From Anesthesia Perspective.     .

## 2024-01-24 NOTE — NURSING TRANSFER
Nursing Transfer Note      1/24/2024   11:22 AM    Transfer To: Room 655    Transfer via stretcher    Transported by hospital transport staff    Telemetry: Rate 54  Order for Tele Monitor? No    4eyes on Skin: yes    Medicines sent: none    Any special needs or follow-up needed: none    Patient belongings transferred with patient: Yes    Chart send with patient: Yes    Notified: father    Patient reassessed at: 1/24/2024  10:27 (date, time)

## 2024-01-24 NOTE — ASSESSMENT & PLAN NOTE
Patient reports normally emptying her ileostomy 2-3x following each meal at baseline; on admission, she reports having to empty her 12-15x per day.    - Anti-diarrheals scheduled  - Discontinued IVF; encouraging increased PO intake  - Replete electrolytes as needed  - Continue regular diet

## 2024-01-24 NOTE — PROGRESS NOTES
[facility] Medicine  Progress Note    Patient Name: Ivy Salgado  MRN: [id number]  Patient Class: IP- Inpatient   Admission Date: 1/16/2024  Length of Stay: 7 days  Attending Physician: Philippe Lee, *  Primary Care Provider: Luis Madden DO        Subjective:     Principal Problem:Crohn's disease of both small and large intestine        HPI:  Patient is a 44 yo female with PMH of Crohn's disease s/p ileostomy, GERD, and ARPITA who presented from GI clinic at the request for admission from Dr. Peace Garcia for ongoing high ileostomy output. Of note, she was recently hospitalized 12/31-01/07 for similar presentation. She is reporting generalized 8/10 abdominal pain, nausea, high ileostomy output, decreased appetite,and heartburn. She states her symptoms are similar the symptoms she used to have with Crohn's flares in the past, but she reports she hasn't had a flare since 2020. She reports normally emptying her ileostomy bag 2-3 times after meals, but in the last few weeks, she has had to continuously empty the bag as high as 12 times even when not eating meals. Her appetite has been decreased, reporting only eating half of her normal intake. During her recent admission, she had a formal workup with EGD/ileoscopy and stool studies that were largely unrevealing aside from multiple ulcers in the prepyloric region. She denies fever, chills, chest pain, SOB, palpitations, headache, vision changes, skin changes, vomiting, urinary symptoms, blood in stool, weight loss, or weakness.     She arrived to [facility] as a direct admission with plans to be started on IVF, anti-diarrheals, pain medications, and plans for stool studies and repeat ileoscopy. She was admitted to hospital medicine for symptomatic care and management of high ileostomy output and potential Crohn's flare with consult to IBD team.    Overview/Hospital Course:  Patient was admitted as a direct admit from GI clinic due to high  ileostomy output. She was treated with continuous IVF, anti-diarrheals, and pain medications as needed. GI/IBD were consulted, recommended continued supportive treatment and IV Solumedrol. Initially there were no plans for repeat ileoscopy as she recently completed one during last admission, biopsy from which showed active enteritis with ulceration, however patient with continued pain and high output. Plan for ileoscopy on 1/24. CDiff studies negative, E. Coli Ag negative, and Stool culture with no growth.     Interval History: NAEON. AFVSS. Continues to report abdominal pain and poor po intake. Gi planning on repeat ileoscopy tomorrow. NPO at [facility].     Review of Systems   Constitutional:  Positive for appetite change. Negative for chills, fever and unexpected weight change.   HENT:  Negative for rhinorrhea and sore throat.    Eyes:  Negative for visual disturbance.   Respiratory:  Negative for cough, choking, chest tightness and shortness of breath.    Cardiovascular:  Negative for chest pain, palpitations and leg swelling.   Gastrointestinal:  Positive for abdominal distention, abdominal pain and nausea. Negative for blood in stool and vomiting.   Genitourinary:  Positive for flank pain. Negative for dysuria, frequency and urgency.   Musculoskeletal:  Positive for arthralgias (recent shoulder surgery) and back pain.   Neurological:  Negative for weakness, light-headedness and headaches.   Psychiatric/Behavioral:  Negative for confusion and decreased concentration.      Objective:     Vital Signs (Most Recent):  Temp: 98 °F (36.7 °C) (01/23/24 1545)  Pulse: 61 (01/23/24 1926)  Resp: 17 (01/23/24 1755)  BP: 130/86 (01/23/24 1926)  SpO2: 99 % (01/23/24 1926) Vital Signs (24h Range):  Temp:  [97.4 °F (36.3 °C)-98.7 °F (37.1 °C)] 98 °F (36.7 °C)  Pulse:  [59-78] 61  Resp:  [14-18] 17  SpO2:  [96 %-99 %] 99 %  BP: (111-133)/(71-86) 130/86     Weight: 51.3 kg (113 lb 1.5 oz)  Body mass index is 21.37  kg/m².    Intake/Output Summary (Last 24 hours) at 1/23/2024 2000  Last data filed at 1/22/2024 2038  Gross per 24 hour   Intake 360 ml   Output 400 ml   Net -40 ml         Physical Exam  Vitals and nursing note reviewed.   Constitutional:       General: She is not in acute distress.     Appearance: Normal appearance. She is normal weight. She is not ill-appearing.   HENT:      Head: Normocephalic and atraumatic.      Mouth/Throat:      Mouth: Mucous membranes are moist.      Pharynx: Oropharynx is clear.   Eyes:      General: No scleral icterus.     Extraocular Movements: Extraocular movements intact.      Conjunctiva/sclera: Conjunctivae normal.      Pupils: Pupils are equal, round, and reactive to light.   Cardiovascular:      Rate and Rhythm: Normal rate and regular rhythm.      Pulses: Normal pulses.      Heart sounds: Normal heart sounds.   Pulmonary:      Effort: Pulmonary effort is normal. No respiratory distress.      Breath sounds: Normal breath sounds. No wheezing.   Abdominal:      General: Bowel sounds are normal. There is no distension.      Palpations: Abdomen is soft.      Tenderness: There is abdominal tenderness. There is no guarding or rebound.      Comments: Ileostomy bag in place, no erythema or signs of leakage. Site is clean, dry and intact   Musculoskeletal:         General: No tenderness.      Right lower leg: No edema.      Left lower leg: No edema.   Skin:     General: Skin is warm and dry.      Coloration: Skin is not pale.      Findings: No erythema or rash.   Neurological:      General: No focal deficit present.      Mental Status: She is alert and oriented to person, place, and time. Mental status is at baseline.      Cranial Nerves: No cranial nerve deficit.   Psychiatric:         Mood and Affect: Mood normal.         Behavior: Behavior normal.             Significant Labs: All pertinent labs within the past 24 hours have been reviewed.  CBC:   Recent Labs   Lab 01/22/24  4191  01/23/24  0302   WBC 10.57 11.04   HGB 10.1* 10.4*   HCT 31.8* 33.0*    398     CMP:   Recent Labs   Lab 01/22/24  0433 01/23/24  0302    141   K 4.2 3.9    109   CO2 25 23   * 135*   BUN 15 17   CREATININE 0.6 0.8   CALCIUM 8.9 9.0   PROT 6.8 7.1   ALBUMIN 3.1* 3.2*   BILITOT 0.1 0.2   ALKPHOS 116 114   AST 9* 9*   ALT 10 10   ANIONGAP 5* 9       Significant Imaging: I have reviewed all pertinent imaging results/findings within the past 24 hours.    Assessment/Plan:      * Crohn's disease of both small and large intestine  43-year-old woman with PMH GERD, Crohn's s/p ileostomy presented to GI clinic with high ileostomy output 01/16 Recent admission to [facility] (12/31-1/7) with Crohn's flare.  Admission requested by GI (Dr. Peace Garcia) for IVF, ileostomy through stoma to see if Crohn's is worse, stool studies and treatment to get her output down.  Patient reports symptoms of nausea, abdominal pain, and high ileostomy output to be similar to past Crohn's flares.   Upon transfer, HDS and AF. CBC unremarkable. CMP unremarkable. ESR 65. Mg 1.2. Phos 5.3. . CRP 1.8    Plan:  - Consulted GI/IBD Team  - Plan for repeat ileoscopy tomorrow 1/24. NPO at [facility].   - F/u VIP, 5HIAA, Gastrin  - Stool Culture and Blood cultures with no growth to date  - C.Diff negative; E Coli Ag negative  - Continuing Solumedrol 20 mg IV TID.  - Continuous IVF - LR @ 100/hr  - Anti-diarrheals  - Daily CBC, CMP, Mg, and Phos    Gastroesophageal reflux disease  - Protonix 40 mg BID      Nausea  - Phenergan 25mg q6h PRN first choice  - Compazine 2.5mg q6h PRN second choice    High output ileostomy  Patient reports normally emptying her ileostomy 2-3x following each meal at baseline; on admission, she reports having to empty her 12-15x per day.    - Anti-diarrheals scheduled  - Continuous IVF  - Replete electrolytes as needed  - Continue regular diet    Abdominal pain  - Morphine 4mg IV q4h prn severe pain  - Morphine 2mg  IV q4h breakthrough pain   - CTAP due to acute worsening pain (01/19); negative for acute process, however was done without oral contrast. Consider repeating CTAP with oral contrast    Generalized anxiety disorder    - Restarted home mirtazapine.     S/P ileostomy  Ileostomy placed as a result of total proctocolectomy due to fistulizing Crohn's disease.        VTE Risk Mitigation (From admission, onward)           Ordered     enoxaparin injection 40 mg  Every 24 hours         01/16/24 1429     Reason for No Pharmacological VTE Prophylaxis  Once        Question:  Reasons:  Answer:  Risk of Bleeding    01/16/24 1335     IP VTE HIGH RISK PATIENT  Once         01/16/24 1335     Place sequential compression device  Until discontinued         01/16/24 1335                    Discharge Planning   CHRISTOPH: 1/24/2024     Code Status: Full Code   Is the patient medically ready for discharge?: No    Reason for patient still in hospital (select all that apply): Patient trending condition and Treatment  Discharge Plan A: Home with family   Discharge Delays: None known at this time              Quiana Shelley MD  Department of Hospital Medicine   [facility]

## 2024-01-25 LAB
ALBUMIN SERPL BCP-MCNC: 3.4 G/DL (ref 3.5–5.2)
ALP SERPL-CCNC: 102 U/L (ref 55–135)
ALT SERPL W/O P-5'-P-CCNC: 11 U/L (ref 10–44)
ANION GAP SERPL CALC-SCNC: 9 MMOL/L (ref 8–16)
AST SERPL-CCNC: 10 U/L (ref 10–40)
BILIRUB SERPL-MCNC: 0.2 MG/DL (ref 0.1–1)
BUN SERPL-MCNC: 20 MG/DL (ref 6–20)
CALCIT SERPL-MCNC: <5 PG/ML
CALCIUM SERPL-MCNC: 8.8 MG/DL (ref 8.7–10.5)
CGA SERPL-MCNC: 100 NG/ML
CHLORIDE SERPL-SCNC: 109 MMOL/L (ref 95–110)
CO2 SERPL-SCNC: 24 MMOL/L (ref 23–29)
CREAT SERPL-MCNC: 0.8 MG/DL (ref 0.5–1.4)
EST. GFR  (NO RACE VARIABLE): >60 ML/MIN/1.73 M^2
GASTRIN SERPL-MCNC: 79 PG/ML
GLUCOSE SERPL-MCNC: 138 MG/DL (ref 70–110)
MAGNESIUM SERPL-MCNC: 1.9 MG/DL (ref 1.6–2.6)
PHOSPHATE SERPL-MCNC: 3.5 MG/DL (ref 2.7–4.5)
POTASSIUM SERPL-SCNC: 4 MMOL/L (ref 3.5–5.1)
PROT SERPL-MCNC: 7.3 G/DL (ref 6–8.4)
SODIUM SERPL-SCNC: 142 MMOL/L (ref 136–145)

## 2024-01-25 PROCEDURE — 94761 N-INVAS EAR/PLS OXIMETRY MLT: CPT

## 2024-01-25 PROCEDURE — 25000003 PHARM REV CODE 250

## 2024-01-25 PROCEDURE — 36415 COLL VENOUS BLD VENIPUNCTURE: CPT

## 2024-01-25 PROCEDURE — 99233 SBSQ HOSP IP/OBS HIGH 50: CPT | Mod: FS,,,

## 2024-01-25 PROCEDURE — 63600175 PHARM REV CODE 636 W HCPCS

## 2024-01-25 PROCEDURE — 11000001 HC ACUTE MED/SURG PRIVATE ROOM

## 2024-01-25 PROCEDURE — 84100 ASSAY OF PHOSPHORUS: CPT

## 2024-01-25 PROCEDURE — 80053 COMPREHEN METABOLIC PANEL: CPT

## 2024-01-25 PROCEDURE — 83735 ASSAY OF MAGNESIUM: CPT

## 2024-01-25 RX ADMIN — THERA TABS 1 TABLET: TAB at 08:01

## 2024-01-25 RX ADMIN — OXYCODONE AND ACETAMINOPHEN 1 TABLET: 10; 325 TABLET ORAL at 08:01

## 2024-01-25 RX ADMIN — MORPHINE SULFATE 4 MG: 4 INJECTION INTRAVENOUS at 04:01

## 2024-01-25 RX ADMIN — OXYCODONE AND ACETAMINOPHEN 1 TABLET: 10; 325 TABLET ORAL at 10:01

## 2024-01-25 RX ADMIN — CLONAZEPAM 1 MG: 0.5 TABLET ORAL at 10:01

## 2024-01-25 RX ADMIN — LOPERAMIDE HYDROCHLORIDE 4 MG: 1 SOLUTION ORAL at 08:01

## 2024-01-25 RX ADMIN — MIRTAZAPINE 30 MG: 15 TABLET, ORALLY DISINTEGRATING ORAL at 10:01

## 2024-01-25 RX ADMIN — DRONABINOL 5 MG: 2.5 CAPSULE ORAL at 06:01

## 2024-01-25 RX ADMIN — OXYCODONE AND ACETAMINOPHEN 1 TABLET: 10; 325 TABLET ORAL at 02:01

## 2024-01-25 RX ADMIN — PANTOPRAZOLE SODIUM 40 MG: 40 TABLET, DELAYED RELEASE ORAL at 08:01

## 2024-01-25 RX ADMIN — NADOLOL 40 MG: 20 TABLET ORAL at 08:01

## 2024-01-25 RX ADMIN — LOPERAMIDE HYDROCHLORIDE 4 MG: 1 SOLUTION ORAL at 04:01

## 2024-01-25 RX ADMIN — OXYCODONE AND ACETAMINOPHEN 1 TABLET: 10; 325 TABLET ORAL at 06:01

## 2024-01-25 RX ADMIN — ENOXAPARIN SODIUM 40 MG: 40 INJECTION SUBCUTANEOUS at 04:01

## 2024-01-25 RX ADMIN — PROMETHAZINE HYDROCHLORIDE 25 MG: 25 TABLET ORAL at 11:01

## 2024-01-25 RX ADMIN — CLONAZEPAM 1 MG: 0.5 TABLET ORAL at 08:01

## 2024-01-25 RX ADMIN — MORPHINE SULFATE 4 MG: 4 INJECTION INTRAVENOUS at 11:01

## 2024-01-25 RX ADMIN — LOPERAMIDE HYDROCHLORIDE 4 MG: 1 SOLUTION ORAL at 02:01

## 2024-01-25 RX ADMIN — DRONABINOL 5 MG: 2.5 CAPSULE ORAL at 04:01

## 2024-01-25 RX ADMIN — GABAPENTIN 300 MG: 300 CAPSULE ORAL at 10:01

## 2024-01-25 RX ADMIN — GABAPENTIN 300 MG: 300 CAPSULE ORAL at 08:01

## 2024-01-25 RX ADMIN — PREDNISONE 40 MG: 20 TABLET ORAL at 08:01

## 2024-01-25 RX ADMIN — LOPERAMIDE HYDROCHLORIDE 4 MG: 1 SOLUTION ORAL at 10:01

## 2024-01-25 NOTE — PLAN OF CARE
Problem: Pain Acute  Goal: Acceptable Pain Control and Functional Ability  Outcome: Ongoing, Progressing     Problem: Fall Injury Risk  Goal: Absence of Fall and Fall-Related Injury  Outcome: Ongoing, Progressing     Problem: Adult Inpatient Plan of Care  Goal: Absence of Hospital-Acquired Illness or Injury  Outcome: Ongoing, Progressing  Goal: Optimal Comfort and Wellbeing  Outcome: Ongoing, Progressing  Goal: Readiness for Transition of Care  Outcome: Ongoing, Progressing  Ileoscopy done today.  Pain medication regiment changed.  To transition to oral steroids tomorrow.

## 2024-01-25 NOTE — ASSESSMENT & PLAN NOTE
43-year-old woman with PMH GERD, Crohn's s/p ileostomy presented to GI clinic with high ileostomy output 01/16 Recent admission to [facility] (12/31-1/7) with Crohn's flare.  Admission requested by GI (Dr. Peace Garcia) for IVF, ileostomy through stoma to see if Crohn's is worse, stool studies and treatment to get her output down.  Patient reports symptoms of nausea, abdominal pain, and high ileostomy output to be similar to past Crohn's flares.   Upon transfer, HDS and AF. CBC unremarkable. CMP unremarkable. ESR 65. Mg 1.2. Phos 5.3. . CRP 1.8    Plan:  - Consulted GI/IBD Team  - Repeat Ileoscopy completed 01/24 and only significant for one non-bleeding ulcer 19 cm proximal to the stoma  - F/u VIP, 5HIAA, Gastrin  - Stool Culture and Blood cultures with no growth to date  - C.Diff negative; E Coli Ag negative  - Transitioning steroids to a Prednisone taper starting at 40 mg daily for 5 days, then taper down 10 mg every 5 days.  - Discontinued IVF; encouraging increased PO intake and drink to thirst  - Anti-diarrheals  - Daily CBC, CMP, Mg, and Phos

## 2024-01-25 NOTE — ASSESSMENT & PLAN NOTE
- Discontinued IVF; encouraging increased PO intake and drinking to thirst  - Adult regular diet

## 2024-01-25 NOTE — ASSESSMENT & PLAN NOTE
Patient had the following procedures in 07/23; still having associated pain soreness    1.Total shoulder arthroplasty (complex, -22 modifier)  2. Shoulder lysis of adhesions  3. Shoulder subpectoral biceps tenodesis (CPT 29877)

## 2024-01-25 NOTE — SUBJECTIVE & OBJECTIVE
Interval History: NAEON. AFVSS. Patient appears comfortable today and says her pain is well controlled following spacing out opiate medications. IVF were stopped yesterday, and she is continuing to eat/drink adequately. States her output from ileostomy is still increased overall but less than when admitted. GI continuing to follow patient. Overall, patient is continuing to improve clinically. Possible discharge home in the next 1-2 days.      Review of Systems  Objective:     Vital Signs (Most Recent):  Temp: 96.5 °F (35.8 °C) (01/25/24 1149)  Pulse: 64 (01/25/24 1149)  Resp: 16 (01/25/24 1149)  BP: 116/70 (01/25/24 1149)  SpO2: 97 % (01/25/24 1149) Vital Signs (24h Range):  Temp:  [96.4 °F (35.8 °C)-98.7 °F (37.1 °C)] 96.5 °F (35.8 °C)  Pulse:  [62-71] 64  Resp:  [14-18] 16  SpO2:  [96 %-99 %] 97 %  BP: (112-131)/(70-84) 116/70     Weight: 51.3 kg (113 lb 1.5 oz)  Body mass index is 21.37 kg/m².    Intake/Output Summary (Last 24 hours) at 1/25/2024 1357  Last data filed at 1/25/2024 0848  Gross per 24 hour   Intake 280 ml   Output --   Net 280 ml         Physical Exam  Vitals and nursing note reviewed.   Constitutional:       General: She is not in acute distress.     Appearance: Normal appearance. She is normal weight. She is not ill-appearing.   HENT:      Head: Normocephalic and atraumatic.      Mouth/Throat:      Mouth: Mucous membranes are moist.      Pharynx: Oropharynx is clear.   Eyes:      General: No scleral icterus.     Extraocular Movements: Extraocular movements intact.      Conjunctiva/sclera: Conjunctivae normal.      Pupils: Pupils are equal, round, and reactive to light.   Cardiovascular:      Rate and Rhythm: Normal rate and regular rhythm.      Pulses: Normal pulses.      Heart sounds: Normal heart sounds.   Pulmonary:      Effort: Pulmonary effort is normal. No respiratory distress.      Breath sounds: Normal breath sounds. No wheezing.   Abdominal:      General: Bowel sounds are normal. There  is no distension.      Palpations: Abdomen is soft.      Tenderness: There is abdominal tenderness. There is no guarding or rebound.      Comments: Ileostomy bag in place, no erythema or signs of leakage. Site is clean, dry and intact   Musculoskeletal:         General: No tenderness.      Right lower leg: No edema.      Left lower leg: No edema.   Skin:     General: Skin is warm and dry.      Coloration: Skin is not pale.      Findings: No erythema or rash.   Neurological:      General: No focal deficit present.      Mental Status: She is alert and oriented to person, place, and time. Mental status is at baseline.      Cranial Nerves: No cranial nerve deficit.   Psychiatric:         Mood and Affect: Mood normal.         Behavior: Behavior normal.             Significant Labs: All pertinent labs within the past 24 hours have been reviewed.    Significant Imaging: I have reviewed all pertinent imaging results/findings within the past 24 hours.

## 2024-01-25 NOTE — PROGRESS NOTES
[facility] Medicine  Progress Note    Patient Name: Ivy Salgado  MRN: [id number]  Patient Class: IP- Inpatient   Admission Date: 1/16/2024  Length of Stay: 9 days  Attending Physician: Philippe Lee, *  Primary Care Provider: Luis Madden DO        Subjective:     Principal Problem:Crohn's disease of both small and large intestine        HPI:  Patient is a 44 yo female with PMH of Crohn's disease s/p ileostomy, GERD, and ARPITA who presented from GI clinic at the request for admission from Dr. Peace Garcia for ongoing high ileostomy output. Of note, she was recently hospitalized 12/31-01/07 for similar presentation. She is reporting generalized 8/10 abdominal pain, nausea, high ileostomy output, decreased appetite,and heartburn. She states her symptoms are similar the symptoms she used to have with Crohn's flares in the past, but she reports she hasn't had a flare since 2020. She reports normally emptying her ileostomy bag 2-3 times after meals, but in the last few weeks, she has had to continuously empty the bag as high as 12 times even when not eating meals. Her appetite has been decreased, reporting only eating half of her normal intake. During her recent admission, she had a formal workup with EGD/ileoscopy and stool studies that were largely unrevealing aside from multiple ulcers in the prepyloric region. She denies fever, chills, chest pain, SOB, palpitations, headache, vision changes, skin changes, vomiting, urinary symptoms, blood in stool, weight loss, or weakness.     She arrived to [facility] [place] as a direct admission with plans to be started on IVF, anti-diarrheals, pain medications, and plans for stool studies and repeat ileoscopy. She was admitted to hospital medicine for symptomatic care and management of high ileostomy output and potential Crohn's flare with consult to IBD team.    Overview/Hospital Course:  Patient was admitted as a direct admit from GI clinic due to high  ileostomy output. She was treated with continuous IVF, anti-diarrheals, and pain medications as needed. GI/IBD were consulted, recommended continued supportive treatment and IV Solumedrol. Initially there were no plans for repeat ileoscopy as she recently completed one during last admission, biopsy from which showed active enteritis with ulceration, however patient with continued pain and high output. Ileoscopy completed on 01/24 and was significant only for a solitary ulcer 19 cm proximal to the stoma. CDiff studies negative, E. Coli Ag negative, and Stool culture with no growth. Steroids were transitioned to Prednisone 40 mg daily and then to be tapered down.     Interval History: NAEON. AFVSS. Patient appears comfortable today and says her pain is well controlled following spacing out opiate medications. IVF were stopped yesterday, and she is continuing to eat/drink adequately. States her output from ileostomy is still increased overall but less than when admitted. GI continuing to follow patient. Overall, patient is continuing to improve clinically. Possible discharge home in the next 1-2 days.      Review of Systems  Objective:     Vital Signs (Most Recent):  Temp: 96.5 °F (35.8 °C) (01/25/24 1149)  Pulse: 64 (01/25/24 1149)  Resp: 16 (01/25/24 1149)  BP: 116/70 (01/25/24 1149)  SpO2: 97 % (01/25/24 1149) Vital Signs (24h Range):  Temp:  [96.4 °F (35.8 °C)-98.7 °F (37.1 °C)] 96.5 °F (35.8 °C)  Pulse:  [62-71] 64  Resp:  [14-18] 16  SpO2:  [96 %-99 %] 97 %  BP: (112-131)/(70-84) 116/70     Weight: 51.3 kg (113 lb 1.5 oz)  Body mass index is 21.37 kg/m².    Intake/Output Summary (Last 24 hours) at 1/25/2024 1357  Last data filed at 1/25/2024 0848  Gross per 24 hour   Intake 280 ml   Output --   Net 280 ml         Physical Exam  Vitals and nursing note reviewed.   Constitutional:       General: She is not in acute distress.     Appearance: Normal appearance. She is normal weight. She is not ill-appearing.   HENT:       Head: Normocephalic and atraumatic.      Mouth/Throat:      Mouth: Mucous membranes are moist.      Pharynx: Oropharynx is clear.   Eyes:      General: No scleral icterus.     Extraocular Movements: Extraocular movements intact.      Conjunctiva/sclera: Conjunctivae normal.      Pupils: Pupils are equal, round, and reactive to light.   Cardiovascular:      Rate and Rhythm: Normal rate and regular rhythm.      Pulses: Normal pulses.      Heart sounds: Normal heart sounds.   Pulmonary:      Effort: Pulmonary effort is normal. No respiratory distress.      Breath sounds: Normal breath sounds. No wheezing.   Abdominal:      General: Bowel sounds are normal. There is no distension.      Palpations: Abdomen is soft.      Tenderness: There is abdominal tenderness. There is no guarding or rebound.      Comments: Ileostomy bag in place, no erythema or signs of leakage. Site is clean, dry and intact   Musculoskeletal:         General: No tenderness.      Right lower leg: No edema.      Left lower leg: No edema.   Skin:     General: Skin is warm and dry.      Coloration: Skin is not pale.      Findings: No erythema or rash.   Neurological:      General: No focal deficit present.      Mental Status: She is alert and oriented to person, place, and time. Mental status is at baseline.      Cranial Nerves: No cranial nerve deficit.   Psychiatric:         Mood and Affect: Mood normal.         Behavior: Behavior normal.             Significant Labs: All pertinent labs within the past 24 hours have been reviewed.    Significant Imaging: I have reviewed all pertinent imaging results/findings within the past 24 hours.    Assessment/Plan:      * Crohn's disease of both small and large intestine  43-year-old woman with PMH GERD, Crohn's s/p ileostomy presented to GI clinic with high ileostomy output 01/16 [place] [facility] [place] with Crohn's flare.  Admission requested by GI (Dr. Peace Garcia) for IVF, ileostomy through  stoma to see if Crohn's is worse, stool studies and treatment to get her output down.  Patient reports symptoms of nausea, abdominal pain, and high ileostomy output to be similar to past Crohn's flares.   Upon transfer, HDS and AF. CBC unremarkable. CMP unremarkable. ESR 65. Mg 1.2. Phos 5.3. . CRP 1.8    Plan:  - Consulted GI/IBD Team  - Repeat Ileoscopy completed 01/24 and only significant for one non-bleeding ulcer 19 cm proximal to the stoma  - F/u VIP, 5HIAA, Gastrin  - Stool Culture and Blood cultures with no growth to date  - C.Diff negative; E Coli Ag negative  - Transitioning steroids to a Prednisone taper starting at 40 mg daily for 5 days, then taper down 10 mg every 5 days.  - Discontinued IVF; encouraging increased PO intake and drink to thirst  - Anti-diarrheals  - Daily CBC, CMP, Mg, and Phos    Mild malnutrition  - Discontinued IVF; encouraging increased PO intake and drinking to thirst  - Adult regular diet      Terminal ileitis of small intestine  See Crohn's disease of both small and large intestine     S/P shoulder surgery  Patient had the following procedures in 07/23; still having associated pain soreness    1.Total shoulder arthroplasty (complex, -22 modifier)  2. Shoulder lysis of adhesions  3. Shoulder subpectoral biceps tenodesis (CPT 77343)    Gastroesophageal reflux disease  - Protonix 40 mg BID      Nausea  - Phenergan 25mg q6h PRN first choice  - Compazine 2.5mg q6h PRN second choice    High output ileostomy  Patient reports normally emptying her ileostomy 2-3x following each meal at baseline; on admission, she reports having to empty her 12-15x per day.    - Anti-diarrheals scheduled  - Discontinued IVF; encouraging increased PO intake  - Replete electrolytes as needed  - Continue regular diet    Abdominal pain  - Morphine 4mg IV q6h prn severe pain  - Morphine 2mg IV q6h breakthrough pain   - Added Percocet 10 mg q4h prn as patient begins to wean off IV opiates   - CTAP due to  acute worsening pain (01/19); negative for acute process, however was done without oral contrast. Consider repeating CTAP with oral contrast    Generalized anxiety disorder    - Restarted home mirtazapine.     S/P ileostomy  Ileostomy placed as a result of total proctocolectomy due to fistulizing Crohn's disease.        VTE Risk Mitigation (From admission, onward)           Ordered     enoxaparin injection 40 mg  Every 24 hours         01/16/24 1429     Reason for No Pharmacological VTE Prophylaxis  Once        Question:  Reasons:  Answer:  Risk of Bleeding    01/16/24 1335     IP VTE HIGH RISK PATIENT  Once         01/16/24 1335     Place sequential compression device  Until discontinued         01/16/24 1335                    Discharge Planning   CHRISTOPH: 1/26/2024     Code Status: Full Code   Is the patient medically ready for discharge?: No    Reason for patient still in hospital (select all that apply): Patient trending condition  Discharge Plan A: Home with family   Discharge Delays: None known at this time              Lb Meadows DO  Department of Hospital Medicine   [facility]

## 2024-01-25 NOTE — PROGRESS NOTES
[facility] (Inflammatory Bowel Disease) Progress Note:    Reason for Consult: Crohn's disease w/high ileostomy output    Brief History:  Ivy Salgado is a 43 y.o. female for whom GI is consulted for IBD management. 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 GI/IBD consulted for further management of high ostomy output.     She was recently admitted from 12/31 to 1/07 with high ileostomy output and liver enzyme elevation; stool infectious workup negative. She underwent EGD with mild gastritis and ileoscopy with 6 apthous ulcers in the distal ileum. She had some dilation of the bile ducts in the central right hepatic lobe. MRCP showed punctate cystic focus in the pancreatic tail, likely a side IPMN. Prior to discharge, she did have an increase in her ostomy output which improved with scheduled imodium. Over the past 4 to 5 days, she has had an increase in her ileostomy output, which she notes as green, nonbloody. She has been unable to keep up with her fluid intake and has not had an appetite. Associated symptoms include persistent right sided abdominal pain. She was seen in IBD clinic on 1/16 with recommendations for hospital admission. Denies fevers, chills, vomiting, bloody stools. On arrival, she was afebrile and HDS. On labs on admission. She was admitted to Hospital Medicine further workup.     Interval History:  - no charted output but per patient about 820 overnight (7p-7a)  - abdominal pain continues  - Solumedrol  "stopped - prednisone 40 mg started today  - feels "about the same"  - Gastrin - 79  - Calcitonin <5.0  - Chromogranin - 100 (? From PPI use)    IBD History      Current IBD Meds: Entyvio pending restart  Current GI Meds: imodium liquid    Review of Systems   Constitutional:  Positive for malaise/fatigue. Negative for chills, fever and weight loss.   HENT:          No oral ulcers, dysphagia, oral thrush   Eyes:  Negative for blurred vision, pain and redness.   Respiratory:  Negative for cough and shortness of breath.    Cardiovascular:  Negative for chest pain.   Gastrointestinal:  Positive for abdominal pain and diarrhea. Negative for heartburn, nausea and vomiting.   Genitourinary:  Negative for dysuria and hematuria.   Musculoskeletal:  Negative for back pain and joint pain.   Skin:  Negative for rash.   Neurological:  Negative for loss of consciousness.   Psychiatric/Behavioral:  Negative for depression. The patient is not nervous/anxious and does not have insomnia.      Prior Pertinent Surgeries:   - Unclear, colon resection to address fistulous disease process  - Unclear, colon resection to address abscess   - S/p total proctocolectomy with end ileostomy, post-op course complicated by perineal wound which she followed with [facility] for ~ 1 year (Dr. 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  2017- Ileoscopy- The examined portion of the ileum was normal.   Biopsied. Widely patent end ileostomy with healthy appearing mucosa at the ileostomy. Pathology showed fragments of unremarkable small bowel mucosa. No evidence of active colitis, granuloma, dysplasia or malignancy  10/2016- " EGD- Normal esophagus. Small hiatus hernia. Erythematous mucosa in the gastric body. Biopsied. Trace of hematin in the gastric body.   Normal examined duodenum.  Two biopsies were obtained in the duodenal bulb. Four biopsies were obtained in the 2nd part of the duodenum.   10/2016- Ileoscopy- The examined portion of the ileum was normal. Biopsies without any acute abnormalities- CMV, HSV-1 AND HSV-2 stains performed. No celiac. No. H pylori. Normal mucosa of the small bowel.   10/2014- EGD- Normal Study. Biopsied to r/o Crohn's.    10/2014- Ileoscopy- The examined portion of the ileum was normal. Biopsies normal.  2/2014- SBE- A single (solitary) ulcer in the proximal ileum. Biopsied. Mild non-specific enteritis.   9/2013- Ileoscopy- Congested, eroded and ulcerated mucosa in the area at 5 cm proximal to the stoma. Biopsied. The examined portion of the ileum was normal otherwise up to 30 cm. Biopsied. FRAGMENTS OF NONNEOPLASTIC SMALL INTESTINAL MUCOSA WITH NO ACTIVE INFLAMMATION, ULCERATION OR DYSPLASIA PRESENT. THERE IS PRESERVATION OF THE VILLOUS ARCHITECTURE. FRAGMENTS OF NONNEOPLASTIC SMALL INTESTINAL MUCOSA WITH NO ACTIVE INFLAMMATION, ULCERATION OR DYSPLASIA IDENTIFIED.  9/2013- EGD- Normal esophagus. Z-line regular, 36 cm from the incisors. A single gastric polyp. Resected and retrieved. A small amount of food (residue) in the stomach. Removal was successful. Mucosal abnormality in the duodenum. Biopsied. Mild chronic gastritis but otherwise negative.   5/2012- EGD- Normal Study.   5/2012- Colonoscopy- Crohn's colitis mostly involving the distal 30 cm. This was biopsied to r/o CMV, HSV. Mild ileocolonic anastomosis Crohn's disease. Chronic active colitis with surface ulceration, cryptitis, crypt abscess and reactive changes. Negative for CMV.  Therapeutic Drug Monitoring Labs:    Prior IBD Therapies:  Entyvio/Vedolizumab- most recent therapy, was on this from 2724-3163 and then stopped as she was getting  "recurrent UTI's/pyelonephritis   Stelara/Ustekinumab- was on this prior to Entyvio and had multiple infections   Cimzia/Certolizumab- stopped working after some years, unclear on dates, records note she was on this in 2016   Remicade/Inflixmab- stopped working after many years, unsure about antibodies   Humira/Adalimumab- Suspected drug-induced lupus due to joint pains  Prednisone tapers- effective, caused avascular necrosis to her left shoulder requiring arthroplasty   Imuran/Azathioprine- stopped due to drug-induced pancreatitis   Methotrexate- stopped due to significant leukopenia  Sulfasalazine at some point prior to 2012  Entocort- at some point prior to 2012   Canasa- at some point prior to 2012    Inpatient Medications:     droNABinol  5 mg Oral BID AC    enoxparin  40 mg Subcutaneous Q24H (prophylaxis, 1700)    gabapentin  300 mg Oral BID    loperamide  4 mg Oral QID    mirtazapine  30 mg Oral Nightly    multivitamin  1 tablet Oral Daily    nadoloL  40 mg Oral Daily    pantoprazole  40 mg Oral Daily    predniSONE  40 mg Oral Daily       Vital Signs:  /75 (Patient Position: Lying)   Pulse 66   Temp 96.4 °F (35.8 °C) (Oral)   Resp 17   Ht 5' 1" (1.549 m)   Wt 51.3 kg (113 lb 1.5 oz)   LMP 03/30/2015   SpO2 97%   Breastfeeding No   BMI 21.37 kg/m²      Physical Exam  Vitals and nursing note reviewed.   Constitutional:       General: She is not in acute distress.     Appearance: She is not ill-appearing.   Cardiovascular:      Rate and Rhythm: Normal rate.      Pulses: Normal pulses.      Heart sounds: No murmur heard.  Pulmonary:      Effort: Pulmonary effort is normal. No respiratory distress.   Abdominal:      General: Bowel sounds are normal. There is no distension.      Palpations: Abdomen is soft.      Tenderness: There is abdominal tenderness.   Skin:     General: Skin is warm and dry.      Capillary Refill: Capillary refill takes 2 to 3 seconds.   Neurological:      Mental Status: She is " alert and oriented to person, place, and time.         Labs:   Lab Results   Component Value Date    WBC 9.29 01/24/2024    HGB 10.1 (L) 01/24/2024    HCT 31.8 (L) 01/24/2024    MCV 89 01/24/2024     01/24/2024     Lab Results   Component Value Date    CREATININE 0.8 01/25/2024    ALBUMIN 3.4 (L) 01/25/2024    BILITOT 0.2 01/25/2024    ALKPHOS 102 01/25/2024    AST 10 01/25/2024    ALT 11 01/25/2024     Lab Results   Component Value Date    CRP 3.3 01/21/2024    CALPROTECTIN 93.9 (H) 01/16/2024     Lab Results   Component Value Date    HEPBSAG Non-reactive 09/19/2023    HEPBCAB Non-reactive 01/02/2024     Lab Results   Component Value Date    TBGOLDPLUS Negative 01/02/2024     Lab Results   Component Value Date    ADBCYJBR30RN 21 (L) 06/13/2019    TSNCOPSP87 278 01/02/2024       Microbiology Results (last 7 days)       Procedure Component Value Units Date/Time    Blood culture [[id number]] Collected: 01/18/24 1659    Order Status: Completed Specimen: Blood Updated: 01/23/24 2012     Blood Culture, Routine No growth after 5 days.    Blood culture [[id number]] Collected: 01/18/24 1659    Order Status: Completed Specimen: Blood Updated: 01/23/24 2012     Blood Culture, Routine No growth after 5 days.    Stool culture [[id number]] Collected: 01/16/24 1332    Order Status: Completed Specimen: Stool Updated: 01/19/24 1057     Stool Culture No Salmonella,Shigella,Vibrio,Campylobacter,Yersinia isolated.    E. coli 0157 antigen [[id number]] Collected: 01/16/24 1332    Order Status: Completed Specimen: Stool Updated: 01/18/24 1152     Shiga Toxin 1 E.coli Negative     Shiga Toxin 2 E.coli Negative             Impression:  Ivy Salgado is a 43 y.o. female for whom GI is consulted for IBD management. She has a a medical history significant for ARPITA/Depression, GERD, Long QTc syndrome (Type III, on nadolol), s/p SHELLIE (2015) for recurrent ovarian cysts and endometriosis, history of melanoma in situ (buttocks and  para-stomal site, excised in 2018 and 2019 respectively), drug induced pancreatitis from Imuran and Crohn's disease with small and large intestine involvement (diagnosed in 1990; terminal ileum involvement per patient) s/p total proctocolectomy with end ileostomy (6/2012) currently off all therapies since 2019. GI/IBD consulted for further management of high ostomy output.     Since her admit, her stool studies have been unremarkable. Her output continues to be high >1.5L. We increased her imodium to max dose 4 mg 4x a day in order to try to decrease her output. We also added PRN low dose liquid lomotil to assist with continued increased output if needed. She has not taken the PRN lomotil despite having continued high output. She continues on IVFs to assist with fluid replacement lost from ostomy output and her labs continue to be stable despite high output. This morning solumedrol 60 mg was started to try to temper her symptoms which was given.     GI Pathogens panel negative and stool calprotectin significantly decreased since 12/31. Patient continues with high output and R sided abdominal pain despite steroids. We stopped her IVFs yesterday afternoon and today her output continues to be increased - could be still in part due to the day of IVFs she received yesterday as they were stopped late in the afternoon. Today we stopped IV steroids and started prednisone 40 mg with plans to taper by 10 mg every 5 days. She has a close outpatient f/u with Dr Garcia scheduled for 1/30 at 10am. We will monitor output today and consider lotronex prescription to outpatient pharmacy if output is continuously high throughout day today.       Crohn's Disease  High ileostomy output  Acute abdominal pain     Plan:  - Okay to resume diet  - Start Prednisone 40 mg x 5 days   - Prednisone 30 mg x 5 days   - Pred 20 mg x 5 days   - Pred 10 mg x 5 days   - Stop    - At discharge - ambulatory referral to Pain Management and [facility]    - F/U with Dr Garcia in IBD clinic - 1/30 at 10am  - Continue liquid imodium to 4 mg 4 x a day  - PRN lomotil 5ml 4 x day for continued increased output  - Can consider trial of lotronex - but would need to be obtained from outpatient pharmacy to use in the inpatient setting- considering trial depending on output today  - will quantitate ileostomy output  - Encourage limiting of pain medication since scope looks improved   - oral pain medication is okay to try to limit the use of IV   - Avoid NSAIDs since this may exacerbate IBD  - DVT prophylaxis (IBD pts increased risk of VTE) - Lovenox 40 mg QD  - Opiates- if necessary, IV morphine is preferred due to short acting nature      Shelby Zhong NP   Department of Gastroenterology  Inflammatory Bowel Disease

## 2024-01-25 NOTE — ASSESSMENT & PLAN NOTE
- Morphine 4mg IV q6h prn severe pain  - Morphine 2mg IV q6h breakthrough pain   - Added Percocet 10 mg q4h prn as patient begins to wean off IV opiates   - CTAP due to acute worsening pain (01/19); negative for acute process, however was done without oral contrast. Consider repeating CTAP with oral contrast

## 2024-01-26 ENCOUNTER — TELEPHONE (OUTPATIENT)
Dept: INTERNAL MEDICINE | Facility: CLINIC | Age: 44
End: 2024-01-26
Payer: COMMERCIAL

## 2024-01-26 LAB
ALBUMIN SERPL BCP-MCNC: 3.1 G/DL (ref 3.5–5.2)
ALP SERPL-CCNC: 90 U/L (ref 55–135)
ALT SERPL W/O P-5'-P-CCNC: 9 U/L (ref 10–44)
ANION GAP SERPL CALC-SCNC: 10 MMOL/L (ref 8–16)
AST SERPL-CCNC: 9 U/L (ref 10–40)
BILIRUB SERPL-MCNC: 0.2 MG/DL (ref 0.1–1)
BUN SERPL-MCNC: 21 MG/DL (ref 6–20)
CALCIUM SERPL-MCNC: 8.8 MG/DL (ref 8.7–10.5)
CHLORIDE SERPL-SCNC: 106 MMOL/L (ref 95–110)
CO2 SERPL-SCNC: 25 MMOL/L (ref 23–29)
CREAT SERPL-MCNC: 0.8 MG/DL (ref 0.5–1.4)
EST. GFR  (NO RACE VARIABLE): >60 ML/MIN/1.73 M^2
GLUCOSE SERPL-MCNC: 89 MG/DL (ref 70–110)
MAGNESIUM SERPL-MCNC: 2.2 MG/DL (ref 1.6–2.6)
PHOSPHATE SERPL-MCNC: 4.2 MG/DL (ref 2.7–4.5)
POTASSIUM SERPL-SCNC: 4 MMOL/L (ref 3.5–5.1)
PROT SERPL-MCNC: 6.8 G/DL (ref 6–8.4)
SODIUM SERPL-SCNC: 141 MMOL/L (ref 136–145)

## 2024-01-26 PROCEDURE — 63600175 PHARM REV CODE 636 W HCPCS

## 2024-01-26 PROCEDURE — 63600175 PHARM REV CODE 636 W HCPCS: Performed by: STUDENT IN AN ORGANIZED HEALTH CARE EDUCATION/TRAINING PROGRAM

## 2024-01-26 PROCEDURE — 11000001 HC ACUTE MED/SURG PRIVATE ROOM

## 2024-01-26 PROCEDURE — 99232 SBSQ HOSP IP/OBS MODERATE 35: CPT | Mod: ,,,

## 2024-01-26 PROCEDURE — 83735 ASSAY OF MAGNESIUM: CPT

## 2024-01-26 PROCEDURE — 25000003 PHARM REV CODE 250

## 2024-01-26 PROCEDURE — 80053 COMPREHEN METABOLIC PANEL: CPT

## 2024-01-26 PROCEDURE — 84100 ASSAY OF PHOSPHORUS: CPT

## 2024-01-26 PROCEDURE — 36415 COLL VENOUS BLD VENIPUNCTURE: CPT

## 2024-01-26 RX ORDER — OXYCODONE AND ACETAMINOPHEN 10; 325 MG/1; MG/1
1 TABLET ORAL EVERY 6 HOURS PRN
Qty: 28 TABLET | Refills: 0 | Status: SHIPPED | OUTPATIENT
Start: 2024-01-26 | End: 2024-01-27

## 2024-01-26 RX ORDER — PREDNISONE 10 MG/1
30 TABLET ORAL DAILY
Qty: 15 TABLET | Refills: 0 | Status: SHIPPED | OUTPATIENT
Start: 2024-01-30 | End: 2024-01-26

## 2024-01-26 RX ORDER — PREDNISONE 20 MG/1
20 TABLET ORAL DAILY
Status: DISCONTINUED | OUTPATIENT
Start: 2024-02-04 | End: 2024-01-27 | Stop reason: HOSPADM

## 2024-01-26 RX ORDER — PREDNISONE 10 MG/1
10 TABLET ORAL DAILY
Qty: 5 TABLET | Refills: 0 | Status: SHIPPED | OUTPATIENT
Start: 2024-02-09 | End: 2024-01-26

## 2024-01-26 RX ORDER — SODIUM CHLORIDE, SODIUM LACTATE, POTASSIUM CHLORIDE, CALCIUM CHLORIDE 600; 310; 30; 20 MG/100ML; MG/100ML; MG/100ML; MG/100ML
INJECTION, SOLUTION INTRAVENOUS CONTINUOUS
Status: ACTIVE | OUTPATIENT
Start: 2024-01-26 | End: 2024-01-27

## 2024-01-26 RX ORDER — PREDNISONE 20 MG/1
40 TABLET ORAL DAILY
Qty: 6 TABLET | Refills: 0 | Status: CANCELLED | OUTPATIENT
Start: 2024-01-27

## 2024-01-26 RX ORDER — PROMETHAZINE HYDROCHLORIDE 25 MG/1
25 TABLET ORAL EVERY 6 HOURS PRN
Qty: 40 TABLET | Refills: 0 | Status: SHIPPED | OUTPATIENT
Start: 2024-01-26 | End: 2024-02-05

## 2024-01-26 RX ORDER — PREDNISONE 10 MG/1
10 TABLET ORAL DAILY
Status: DISCONTINUED | OUTPATIENT
Start: 2024-02-07 | End: 2024-01-26

## 2024-01-26 RX ORDER — PREDNISONE 20 MG/1
20 TABLET ORAL DAILY
Qty: 5 TABLET | Refills: 0 | Status: SHIPPED | OUTPATIENT
Start: 2024-02-04 | End: 2024-01-26

## 2024-01-26 RX ORDER — PREDNISONE 10 MG/1
10 TABLET ORAL DAILY
Status: DISCONTINUED | OUTPATIENT
Start: 2024-02-09 | End: 2024-01-27 | Stop reason: HOSPADM

## 2024-01-26 RX ORDER — LOPERAMIDE HYDROCHLORIDE 2 MG/1
2 CAPSULE ORAL 4 TIMES DAILY PRN
Qty: 40 CAPSULE | Refills: 0 | Status: SHIPPED | OUTPATIENT
Start: 2024-01-26 | End: 2024-01-26 | Stop reason: HOSPADM

## 2024-01-26 RX ORDER — LOPERAMIDE HCL 1MG/7.5ML
4 LIQUID (ML) ORAL 4 TIMES DAILY
Qty: 3600 ML | Refills: 0 | Status: SHIPPED | OUTPATIENT
Start: 2024-01-26 | End: 2024-02-25

## 2024-01-26 RX ORDER — PREDNISONE 10 MG/1
TABLET ORAL
Qty: 42 TABLET | Refills: 0 | Status: SHIPPED | OUTPATIENT
Start: 2024-01-27 | End: 2024-02-13

## 2024-01-26 RX ORDER — PREDNISONE 20 MG/1
20 TABLET ORAL DAILY
Status: DISCONTINUED | OUTPATIENT
Start: 2024-02-02 | End: 2024-01-26

## 2024-01-26 RX ORDER — DIPHENOXYLATE HYDROCHLORIDE AND ATROPINE SULFATE 2.5; .025 MG/5ML; MG/5ML
SOLUTION ORAL
Qty: 300 ML | Refills: 0 | Status: SHIPPED | OUTPATIENT
Start: 2024-01-26 | End: 2024-03-13 | Stop reason: SDUPTHER

## 2024-01-26 RX ADMIN — MORPHINE SULFATE 4 MG: 4 INJECTION INTRAVENOUS at 01:01

## 2024-01-26 RX ADMIN — OXYCODONE AND ACETAMINOPHEN 1 TABLET: 10; 325 TABLET ORAL at 08:01

## 2024-01-26 RX ADMIN — OXYCODONE AND ACETAMINOPHEN 1 TABLET: 10; 325 TABLET ORAL at 02:01

## 2024-01-26 RX ADMIN — DRONABINOL 5 MG: 2.5 CAPSULE ORAL at 04:01

## 2024-01-26 RX ADMIN — PREDNISONE 40 MG: 20 TABLET ORAL at 08:01

## 2024-01-26 RX ADMIN — ENOXAPARIN SODIUM 40 MG: 40 INJECTION SUBCUTANEOUS at 04:01

## 2024-01-26 RX ADMIN — OXYCODONE AND ACETAMINOPHEN 1 TABLET: 10; 325 TABLET ORAL at 04:01

## 2024-01-26 RX ADMIN — OXYCODONE AND ACETAMINOPHEN 1 TABLET: 10; 325 TABLET ORAL at 07:01

## 2024-01-26 RX ADMIN — NADOLOL 40 MG: 20 TABLET ORAL at 08:01

## 2024-01-26 RX ADMIN — DRONABINOL 5 MG: 2.5 CAPSULE ORAL at 06:01

## 2024-01-26 RX ADMIN — MORPHINE SULFATE 2 MG: 2 INJECTION, SOLUTION INTRAMUSCULAR; INTRAVENOUS at 09:01

## 2024-01-26 RX ADMIN — LOPERAMIDE HYDROCHLORIDE 4 MG: 1 SOLUTION ORAL at 12:01

## 2024-01-26 RX ADMIN — MIRTAZAPINE 30 MG: 15 TABLET, ORALLY DISINTEGRATING ORAL at 09:01

## 2024-01-26 RX ADMIN — MORPHINE SULFATE 4 MG: 4 INJECTION INTRAVENOUS at 05:01

## 2024-01-26 RX ADMIN — GABAPENTIN 300 MG: 300 CAPSULE ORAL at 08:01

## 2024-01-26 RX ADMIN — LOPERAMIDE HYDROCHLORIDE 4 MG: 1 SOLUTION ORAL at 04:01

## 2024-01-26 RX ADMIN — LOPERAMIDE HYDROCHLORIDE 4 MG: 1 SOLUTION ORAL at 09:01

## 2024-01-26 RX ADMIN — THERA TABS 1 TABLET: TAB at 08:01

## 2024-01-26 RX ADMIN — GABAPENTIN 300 MG: 300 CAPSULE ORAL at 09:01

## 2024-01-26 RX ADMIN — SODIUM CHLORIDE, POTASSIUM CHLORIDE, SODIUM LACTATE AND CALCIUM CHLORIDE: 600; 310; 30; 20 INJECTION, SOLUTION INTRAVENOUS at 03:01

## 2024-01-26 RX ADMIN — CLONAZEPAM 1 MG: 0.5 TABLET ORAL at 10:01

## 2024-01-26 RX ADMIN — LOPERAMIDE HYDROCHLORIDE 4 MG: 1 SOLUTION ORAL at 08:01

## 2024-01-26 RX ADMIN — PANTOPRAZOLE SODIUM 40 MG: 40 TABLET, DELAYED RELEASE ORAL at 08:01

## 2024-01-26 NOTE — TREATMENT PLAN
IBD Discharge Treatment Plan:    Prednisone:   - you will continue to take prednisone on discharge at prednisone 40 mg po daily for 5 days and then taper  - your prednisone has been sent to your pharmacy [facility]   - we advise taking prednisone in the morning to prevent interference with your sleep    Infusions/Injections (Entyvio/1st loading dose/frequency)  - next dose due 1/30    Avoid use of NSAIDS and tobacco products.     IBD Clinic Follow-up  Provider: SACHI Garcia MD  Nurse contact: MIKE Stovall  Date/Time: 01/30/24 at 10 am  Phone: [phone]  Fax: [phone]    Be sure to fill out questionnaire prior to your visit and arrive 30 min prior to your visit time.     Please call your physician's office if your symptoms worsen (increased abdominal pain, large amount of blood in stool, change in bowel movements) please reach out to clinic if during business hours ([phone]) or proceed to the nearest Emergency Room for evaluation.

## 2024-01-26 NOTE — NURSING
Pt wants to stay overnight to receive fluids and continue with pain medication, still dealing with a lot of pain and also does not think she her fluid intake is adequate.

## 2024-01-26 NOTE — SUBJECTIVE & OBJECTIVE
Interval History: NAEON. AFVSS. Patient reports having nausea and poor appetite and hasn't been eating all day. States her pain has been well controlled with PO Oxycodone, as we have been spacing out her IV pain meds. Was originally planned for discharge today but will stay another night due to poor PO intake, nausea, and to receive IVF.    Review of Systems  Objective:     Vital Signs (Most Recent):  Temp: 97.6 °F (36.4 °C) (01/26/24 1132)  Pulse: 76 (01/26/24 1132)  Resp: 18 (01/26/24 1456)  BP: 108/71 (01/26/24 1132)  SpO2: 96 % (01/26/24 1132) Vital Signs (24h Range):  Temp:  [96.8 °F (36 °C)-98.5 °F (36.9 °C)] 97.6 °F (36.4 °C)  Pulse:  [67-79] 76  Resp:  [16-18] 18  SpO2:  [96 %-98 %] 96 %  BP: ()/(64-96) 108/71     Weight: 51.3 kg (113 lb 1.5 oz)  Body mass index is 21.37 kg/m².    Intake/Output Summary (Last 24 hours) at 1/26/2024 1634  Last data filed at 1/26/2024 1300  Gross per 24 hour   Intake 240 ml   Output 1825 ml   Net -1585 ml         Physical Exam  Vitals and nursing note reviewed.   Constitutional:       General: She is not in acute distress.     Appearance: Normal appearance. She is normal weight. She is not ill-appearing.   HENT:      Head: Normocephalic and atraumatic.      Mouth/Throat:      Mouth: Mucous membranes are moist.      Pharynx: Oropharynx is clear.   Eyes:      General: No scleral icterus.     Extraocular Movements: Extraocular movements intact.      Conjunctiva/sclera: Conjunctivae normal.      Pupils: Pupils are equal, round, and reactive to light.   Cardiovascular:      Rate and Rhythm: Normal rate and regular rhythm.      Pulses: Normal pulses.      Heart sounds: Normal heart sounds.   Pulmonary:      Effort: Pulmonary effort is normal. No respiratory distress.      Breath sounds: Normal breath sounds. No wheezing.   Abdominal:      General: Bowel sounds are normal. There is no distension.      Palpations: Abdomen is soft.      Tenderness: There is abdominal tenderness.  There is no guarding or rebound.      Comments: Ileostomy bag in place, no erythema or signs of leakage. Site is clean, dry and intact   Musculoskeletal:         General: No tenderness.      Right lower leg: No edema.      Left lower leg: No edema.   Skin:     General: Skin is warm and dry.      Coloration: Skin is not pale.      Findings: No erythema or rash.   Neurological:      General: No focal deficit present.      Mental Status: She is alert and oriented to person, place, and time. Mental status is at baseline.      Cranial Nerves: No cranial nerve deficit.   Psychiatric:         Mood and Affect: Mood normal.         Behavior: Behavior normal.             Significant Labs: All pertinent labs within the past 24 hours have been reviewed.    Significant Imaging: I have reviewed all pertinent imaging results/findings within the past 24 hours.

## 2024-01-26 NOTE — PROGRESS NOTES
[facility] Medicine  Progress Note    Patient Name: Ivy Salgado  MRN: [id number]  Patient Class: IP- Inpatient   Admission Date: 1/16/2024  Length of Stay: 10 days  Attending Physician: Philippe Lee, *  Primary Care Provider: Luis Madden DO        Subjective:     Principal Problem:Crohn's disease of both small and large intestine        HPI:  Patient is a 44 yo female with PMH of Crohn's disease s/p ileostomy, GERD, and ARPITA who presented from GI clinic at the request for admission from Dr. Peace Garcia for ongoing high ileostomy output. Of note, she was recently hospitalized 12/31-01/07 for similar presentation. She is reporting generalized 8/10 abdominal pain, nausea, high ileostomy output, decreased appetite,and heartburn. She states her symptoms are similar the symptoms she used to have with Crohn's flares in the past, but she reports she hasn't had a flare since 2020. She reports normally emptying her ileostomy bag 2-3 times after meals, but in the last few weeks, she has had to continuously empty the bag as high as 12 times even when not eating meals. Her appetite has been decreased, reporting only eating half of her normal intake. During her recent admission, she had a formal workup with EGD/ileoscopy and stool studies that were largely unrevealing aside from multiple ulcers in the prepyloric region. She denies fever, chills, chest pain, SOB, palpitations, headache, vision changes, skin changes, vomiting, urinary symptoms, blood in stool, weight loss, or weakness.     [place] [facility] [place] with plans to be started on IVF, anti-diarrheals, pain medications, and plans for stool studies and repeat ileoscopy. She was admitted to hospital medicine for symptomatic care and management of high ileostomy output and potential Crohn's flare with consult to IBD team.    Overview/Hospital Course:  Patient was admitted as a direct admit from GI clinic due to high  ileostomy output. She was treated with continuous IVF, anti-diarrheals, and pain medications as needed. GI/IBD were consulted, recommended continued supportive treatment and IV Solumedrol. Initially there were no plans for repeat ileoscopy as she recently completed one during last admission, biopsy from which showed active enteritis with ulceration, however patient with continued pain and high output. Ileoscopy completed on 01/24 and was significant only for a solitary ulcer 19 cm proximal to the stoma. CDiff studies negative, E. Coli Ag negative, and Stool culture with no growth. Steroids were transitioned to Prednisone 40 mg daily and then to be tapered down.     Interval History: NAEON. AFVSS. Patient reports having nausea and poor appetite and hasn't been eating all day. States her pain has been well controlled with PO Oxycodone, as we have been spacing out her IV pain meds. Was originally planned for discharge today but will stay another night due to poor PO intake, nausea, and to receive IVF.    Review of Systems  Objective:     Vital Signs (Most Recent):  Temp: 97.6 °F (36.4 °C) (01/26/24 1132)  Pulse: 76 (01/26/24 1132)  Resp: 18 (01/26/24 1456)  BP: 108/71 (01/26/24 1132)  SpO2: 96 % (01/26/24 1132) Vital Signs (24h Range):  Temp:  [96.8 °F (36 °C)-98.5 °F (36.9 °C)] 97.6 °F (36.4 °C)  Pulse:  [67-79] 76  Resp:  [16-18] 18  SpO2:  [96 %-98 %] 96 %  BP: ()/(64-96) 108/71     Weight: 51.3 kg (113 lb 1.5 oz)  Body mass index is 21.37 kg/m².    Intake/Output Summary (Last 24 hours) at 1/26/2024 1634  Last data filed at 1/26/2024 1300  Gross per 24 hour   Intake 240 ml   Output 1825 ml   Net -1585 ml         Physical Exam  Vitals and nursing note reviewed.   Constitutional:       General: She is not in acute distress.     Appearance: Normal appearance. She is normal weight. She is not ill-appearing.   HENT:      Head: Normocephalic and atraumatic.      Mouth/Throat:      Mouth: Mucous membranes are moist.       Pharynx: Oropharynx is clear.   Eyes:      General: No scleral icterus.     Extraocular Movements: Extraocular movements intact.      Conjunctiva/sclera: Conjunctivae normal.      Pupils: Pupils are equal, round, and reactive to light.   Cardiovascular:      Rate and Rhythm: Normal rate and regular rhythm.      Pulses: Normal pulses.      Heart sounds: Normal heart sounds.   Pulmonary:      Effort: Pulmonary effort is normal. No respiratory distress.      Breath sounds: Normal breath sounds. No wheezing.   Abdominal:      General: Bowel sounds are normal. There is no distension.      Palpations: Abdomen is soft.      Tenderness: There is abdominal tenderness. There is no guarding or rebound.      Comments: Ileostomy bag in place, no erythema or signs of leakage. Site is clean, dry and intact   Musculoskeletal:         General: No tenderness.      Right lower leg: No edema.      Left lower leg: No edema.   Skin:     General: Skin is warm and dry.      Coloration: Skin is not pale.      Findings: No erythema or rash.   Neurological:      General: No focal deficit present.      Mental Status: She is alert and oriented to person, place, and time. Mental status is at baseline.      Cranial Nerves: No cranial nerve deficit.   Psychiatric:         Mood and Affect: Mood normal.         Behavior: Behavior normal.             Significant Labs: All pertinent labs within the past 24 hours have been reviewed.    Significant Imaging: I have reviewed all pertinent imaging results/findings within the past 24 hours.    Assessment/Plan:      * Crohn's disease of both small and large intestine  43-year-old woman with PMH GERD, Crohn's s/p ileostomy presented to GI clinic with high ileostomy output 01/16 [place] [facility] [place] with Crohn's flare.  Admission requested by GI (Dr. Peace Garcia) for IVF, ileostomy through stoma to see if Crohn's is worse, stool studies and treatment to get her output down.  Patient  reports symptoms of nausea, abdominal pain, and high ileostomy output to be similar to past Crohn's flares.   Upon transfer, HDS and AF. CBC unremarkable. CMP unremarkable. ESR 65. Mg 1.2. Phos 5.3. . CRP 1.8    Plan:  - Consulted GI/IBD Team  - Repeat Ileoscopy completed 01/24 and only significant for one non-bleeding ulcer 19 cm proximal to the stoma  - F/u VIP, 5HIAA, Gastrin  - Stool Culture and Blood cultures with no growth to date  - C.Diff negative; E Coli Ag negative  - Transitioning steroids to a Prednisone taper starting at 40 mg daily for 5 days, then taper down 10 mg every 5 days.  - Discontinued IVF; encouraging increased PO intake and drink to thirst  - Anti-diarrheals  - Daily CBC, CMP, Mg, and Phos    Mild malnutrition  - Discontinued IVF; encouraging increased PO intake and drinking to thirst  - Adult regular diet      Terminal ileitis of small intestine  See Crohn's disease of both small and large intestine     S/P shoulder surgery  Patient had the following procedures in 07/23; still having associated pain soreness    1.Total shoulder arthroplasty (complex, -22 modifier)  2. Shoulder lysis of adhesions  3. Shoulder subpectoral biceps tenodesis (CPT 08052)    Gastroesophageal reflux disease  - Protonix 40 mg BID      Nausea  - Phenergan 25mg q6h PRN first choice  - Compazine 2.5mg q6h PRN second choice    High output ileostomy  Patient reports normally emptying her ileostomy 2-3x following each meal at baseline; on admission, she reports having to empty her 12-15x per day.    - Anti-diarrheals scheduled  - Discontinued IVF; encouraging increased PO intake  - Replete electrolytes as needed  - Continue regular diet    Abdominal pain  - Morphine 4mg IV q6h prn severe pain  - Morphine 2mg IV q6h breakthrough pain   - Added Percocet 10 mg q4h prn as patient begins to wean off IV opiates   - CTAP due to acute worsening pain (01/19); negative for acute process, however was done without oral  contrast. Consider repeating CTAP with oral contrast    Generalized anxiety disorder    - Restarted home mirtazapine.     S/P ileostomy  Ileostomy placed as a result of total proctocolectomy due to fistulizing Crohn's disease.        VTE Risk Mitigation (From admission, onward)           Ordered     enoxaparin injection 40 mg  Every 24 hours         01/16/24 1429     Reason for No Pharmacological VTE Prophylaxis  Once        Question:  Reasons:  Answer:  Risk of Bleeding    01/16/24 1335     IP VTE HIGH RISK PATIENT  Once         01/16/24 1335     Place sequential compression device  Until discontinued         01/16/24 1335                    Discharge Planning   CHRISTOPH: 1/26/2024     Code Status: Full Code   Is the patient medically ready for discharge?: Yes    Reason for patient still in hospital (select all that apply): Patient trending condition and Pending disposition  Discharge Plan A: Home with family   Discharge Delays: None known at this time              Lb Meadows DO  Department of Hospital Medicine   [facility]

## 2024-01-26 NOTE — PLAN OF CARE
Problem: Fall Injury Risk  Goal: Absence of Fall and Fall-Related Injury  Outcome: Ongoing, Progressing  Intervention: Promote Injury-Free Environment  Flowsheets (Taken 1/26/2024 9613)  Safety Promotion/Fall Prevention:   assistive device/personal item within reach   side rails raised x 2   nonskid shoes/socks when out of bed   instructed to call staff for mobility

## 2024-01-26 NOTE — PLAN OF CARE
Problem: Pain Acute  Goal: Acceptable Pain Control and Functional Ability  Outcome: Ongoing, Not Progressing     Problem: Fall Injury Risk  Goal: Absence of Fall and Fall-Related Injury  Outcome: Ongoing, Progressing     Problem: Adult Inpatient Plan of Care  Goal: Absence of Hospital-Acquired Illness or Injury  Outcome: Ongoing, Progressing  Goal: Optimal Comfort and Wellbeing  Outcome: Ongoing, Not Progressing  Goal: Readiness for Transition of Care  Outcome: Ongoing, Not Progressing     Problem: Infection  Goal: Absence of Infection Signs and Symptoms  Outcome: Ongoing, Progressing   Pt is A,A,Ox 4. Stable V/S. Pt C/O pain 7/10 in her Rt lower abd, pt received PRN pain medications as ordered. Pt ambulated in the room independently. No falls or injuries per day. Ileostomy bag care done by pt.

## 2024-01-26 NOTE — PROGRESS NOTES
"[facility] (Inflammatory Bowel Disease) Progress Note:    Reason for Consult: Crohn's disease w/high ileostomy output    Brief History:  Ivy Salgado is a 43 y.o. female for whom GI is consulted for IBD management. 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 GI/IBD consulted for further management of high ostomy output.     She was recently admitted from 12/31 to 1/07 with high ileostomy output and liver enzyme elevation; stool infectious workup negative. She underwent EGD with mild gastritis and ileoscopy with 6 apthous ulcers in the distal ileum. She had some dilation of the bile ducts in the central right hepatic lobe. MRCP showed punctate cystic focus in the pancreatic tail, likely a side IPMN. Prior to discharge, she did have an increase in her ostomy output which improved with scheduled imodium. Over the past 4 to 5 days, she has had an increase in her ileostomy output, which she notes as green, nonbloody. She has been unable to keep up with her fluid intake and has not had an appetite. Associated symptoms include persistent right sided abdominal pain. She was seen in IBD clinic on 1/16 with recommendations for hospital admission. Denies fevers, chills, vomiting, bloody stools. On arrival, she was afebrile and HDS. On labs on admission. She was admitted to Hospital Medicine further workup.     Interval History:  - 1.4 L ostomy output in last 24 hrs  - abdominal pain continues  - Prednisone 40 mg day 2  - feels "about the " "same"    IBD History      Current IBD Meds: Entyvio pending restart  Current GI Meds: imodium liquid    Review of Systems   Constitutional:  Positive for malaise/fatigue. Negative for chills, fever and weight loss.   HENT:          No oral ulcers, dysphagia, oral thrush   Eyes:  Negative for blurred vision, pain and redness.   Respiratory:  Negative for cough and shortness of breath.    Cardiovascular:  Negative for chest pain.   Gastrointestinal:  Positive for abdominal pain and diarrhea. Negative for heartburn, nausea and vomiting.   Genitourinary:  Negative for dysuria and hematuria.   Musculoskeletal:  Negative for back pain and joint pain.   Skin:  Negative for rash.   Neurological:  Negative for loss of consciousness.   Psychiatric/Behavioral:  Negative for depression. The patient is not nervous/anxious and does not have insomnia.      Prior Pertinent Surgeries:   - Unclear, colon resection to address fistulous disease process  - Unclear, colon resection to address abscess   - S/p total proctocolectomy with end ileostomy, post-op course complicated by perineal wound which she followed with CRS for ~ 1 year (Dr. Parsons), treated with antibiotics and gel foam, healed   - S/p SHELLIE for ovarian cysts and endometriosis   - Port placement for outpatient immunotherapy infusions   2023- Left Shoulder Arthoplasty for avascular necrosis /2 chronic steroid exposure     Pertinent Endoscopy/Imagin2024- EGD- mild gastritis  2024-Ileoscopy- 6 ulcers in the distal ileum.  2017- Ileoscopy- The examined portion of the ileum was normal.   Biopsied. Widely patent end ileostomy with healthy appearing mucosa at the ileostomy. Pathology showed fragments of unremarkable small bowel mucosa. No evidence of active colitis, granuloma, dysplasia or malignancy  10/2016- EGD- Normal esophagus. Small hiatus hernia. Erythematous mucosa in the gastric body. Biopsied. Trace of hematin in the gastric body. "   Normal examined duodenum.  Two biopsies were obtained in the duodenal bulb. Four biopsies were obtained in the 2nd part of the duodenum.   10/2016- Ileoscopy- The examined portion of the ileum was normal. Biopsies without any acute abnormalities- CMV, HSV-1 AND HSV-2 stains performed. No celiac. No. H pylori. Normal mucosa of the small bowel.   10/2014- EGD- Normal Study. Biopsied to r/o Crohn's.    10/2014- Ileoscopy- The examined portion of the ileum was normal. Biopsies normal.  2/2014- SBE- A single (solitary) ulcer in the proximal ileum. Biopsied. Mild non-specific enteritis.   9/2013- Ileoscopy- Congested, eroded and ulcerated mucosa in the area at 5 cm proximal to the stoma. Biopsied. The examined portion of the ileum was normal otherwise up to 30 cm. Biopsied. FRAGMENTS OF NONNEOPLASTIC SMALL INTESTINAL MUCOSA WITH NO ACTIVE INFLAMMATION, ULCERATION OR DYSPLASIA PRESENT. THERE IS PRESERVATION OF THE VILLOUS ARCHITECTURE. FRAGMENTS OF NONNEOPLASTIC SMALL INTESTINAL MUCOSA WITH NO ACTIVE INFLAMMATION, ULCERATION OR DYSPLASIA IDENTIFIED.  9/2013- EGD- Normal esophagus. Z-line regular, 36 cm from the incisors. A single gastric polyp. Resected and retrieved. A small amount of food (residue) in the stomach. Removal was successful. Mucosal abnormality in the duodenum. Biopsied. Mild chronic gastritis but otherwise negative.   5/2012- EGD- Normal Study.   5/2012- Colonoscopy- Crohn's colitis mostly involving the distal 30 cm. This was biopsied to r/o CMV, HSV. Mild ileocolonic anastomosis Crohn's disease. Chronic active colitis with surface ulceration, cryptitis, crypt abscess and reactive changes. Negative for CMV.  Therapeutic Drug Monitoring Labs:    Prior IBD Therapies:  Entyvio/Vedolizumab- most recent therapy, was on this from 4293-5866 and then stopped as she was getting recurrent UTI's/pyelonephritis   Stelara/Ustekinumab- was on this prior to Entyvio and had multiple infections   Cimzia/Certolizumab-  "stopped working after some years, unclear on dates, records note she was on this in 2016   Remicade/Inflixmab- stopped working after many years, unsure about antibodies   Humira/Adalimumab- Suspected drug-induced lupus due to joint pains  Prednisone tapers- effective, caused avascular necrosis to her left shoulder requiring arthroplasty   Imuran/Azathioprine- stopped due to drug-induced pancreatitis   Methotrexate- stopped due to significant leukopenia  Sulfasalazine at some point prior to 2012  Entocort- at some point prior to 2012   Canasa- at some point prior to 2012    Inpatient Medications:     droNABinol  5 mg Oral BID AC    enoxparin  40 mg Subcutaneous Q24H (prophylaxis, 1700)    gabapentin  300 mg Oral BID    loperamide  4 mg Oral QID    mirtazapine  30 mg Oral Nightly    multivitamin  1 tablet Oral Daily    nadoloL  40 mg Oral Daily    pantoprazole  40 mg Oral Daily    predniSONE  40 mg Oral Daily       Vital Signs:  BP (!) 146/96 (BP Location: Right arm, Patient Position: Sitting)   Pulse 79   Temp 98.5 °F (36.9 °C) (Oral)   Resp 16   Ht 5' 1" (1.549 m)   Wt 51.3 kg (113 lb 1.5 oz)   LMP 03/30/2015   SpO2 96%   Breastfeeding No   BMI 21.37 kg/m²      Physical Exam  Vitals and nursing note reviewed.   Constitutional:       General: She is not in acute distress.     Appearance: She is not ill-appearing.   Cardiovascular:      Rate and Rhythm: Normal rate.      Pulses: Normal pulses.      Heart sounds: No murmur heard.  Pulmonary:      Effort: Pulmonary effort is normal. No respiratory distress.   Abdominal:      General: Bowel sounds are normal. There is no distension.      Palpations: Abdomen is soft.      Tenderness: There is abdominal tenderness.   Skin:     General: Skin is warm and dry.      Capillary Refill: Capillary refill takes 2 to 3 seconds.   Neurological:      Mental Status: She is alert and oriented to person, place, and time.         Labs:   Lab Results   Component Value Date    WBC " 9.29 01/24/2024    HGB 10.1 (L) 01/24/2024    HCT 31.8 (L) 01/24/2024    MCV 89 01/24/2024     01/24/2024     Lab Results   Component Value Date    CREATININE 0.8 01/26/2024    ALBUMIN 3.1 (L) 01/26/2024    BILITOT 0.2 01/26/2024    ALKPHOS 90 01/26/2024    AST 9 (L) 01/26/2024    ALT 9 (L) 01/26/2024     Lab Results   Component Value Date    CRP 3.3 01/21/2024    CALPROTECTIN 93.9 (H) 01/16/2024     Lab Results   Component Value Date    HEPBSAG Non-reactive 09/19/2023    HEPBCAB Non-reactive 01/02/2024     Lab Results   Component Value Date    TBGOLDPLUS Negative 01/02/2024     Lab Results   Component Value Date    KPPJHICF27OV 21 (L) 06/13/2019    VIEWFVGY13 278 01/02/2024       Microbiology Results (last 7 days)       Procedure Component Value Units Date/Time    Blood culture [[id number]] Collected: 01/18/24 1659    Order Status: Completed Specimen: Blood Updated: 01/23/24 2012     Blood Culture, Routine No growth after 5 days.    Blood culture [[id number]] Collected: 01/18/24 1659    Order Status: Completed Specimen: Blood Updated: 01/23/24 2012     Blood Culture, Routine No growth after 5 days.    Stool culture [[id number]] Collected: 01/16/24 1332    Order Status: Completed Specimen: Stool Updated: 01/19/24 1057     Stool Culture No Salmonella,Shigella,Vibrio,Campylobacter,Yersinia isolated.             Impression:  Ivy Salgado is a 43 y.o. female for whom GI is consulted for IBD management. 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 GI/IBD consulted for further management of high ostomy output.     Since her admit, her stool studies have been unremarkable. Her output continues to be high >1.5L. We increased her imodium to max dose 4 mg 4x a day in order to try to decrease her output. We also added PRN low dose liquid lomotil to assist with continued increased output if needed. She has not taken the PRN lomotil despite having continued high output. She continues on IVFs to assist with fluid replacement lost from ostomy output and her labs continue to be stable despite high output. This morning solumedrol 60 mg was started to try to temper her symptoms which was given.     GI Pathogens panel negative and stool calprotectin significantly decreased since 12/31. Patient continues with high output and R sided abdominal pain despite steroids. Started prednisone 40 mg 1/25 with plans to taper by 10 mg every 5 days. She has a close outpatient f/u with Dr Garcia scheduled for 1/30 at 10am. We will monitor output today and consider lotronex prescription to outpatient pharmacy. Patient wants to go but will discuss and round on patient with Dr Garcia.    Crohn's Disease  High ileostomy output  Acute abdominal pain     Plan:  - Continue Prednisone 40 mg x 5 days   - Prednisone 30 mg x 5 days   - Pred 20 mg x 5 days   - Pred 10 mg x 5 days   - Stop  *this was ordered with dates to start each dose*    - At discharge - ambulatory referral to Pain Management and [facility]    - F/U with Dr Garcia in IBD clinic - 1/30 at 10am  - Continue liquid imodium to 4 mg 4 x a day  - PRN lomotil 5ml 4 x day for continued increased output  - Can consider trial of lotronex - but would need to be obtained from outpatient pharmacy to use in the inpatient setting- considering trial depending on output today  - Encourage limiting of pain medication since scope looks improved   - oral pain medication is okay to try to limit the use of IV   - Avoid NSAIDs since  this may exacerbate IBD  - DVT prophylaxis (IBD pts increased risk of VTE) - Lovenox 40 mg QD  - Opiates- if necessary, IV morphine is preferred due to short acting nature      Shelby Zhong NP   Department of Gastroenterology  Inflammatory Bowel Disease

## 2024-01-27 VITALS
TEMPERATURE: 98 F | OXYGEN SATURATION: 97 % | HEIGHT: 61 IN | DIASTOLIC BLOOD PRESSURE: 56 MMHG | SYSTOLIC BLOOD PRESSURE: 100 MMHG | WEIGHT: 113.13 LBS | HEART RATE: 74 BPM | RESPIRATION RATE: 18 BRPM | BODY MASS INDEX: 21.36 KG/M2

## 2024-01-27 LAB
5OH-INDOLEACETATE SERPL-MCNC: 8 NG/ML
ALBUMIN SERPL BCP-MCNC: 2.9 G/DL (ref 3.5–5.2)
ALP SERPL-CCNC: 82 U/L (ref 55–135)
ALT SERPL W/O P-5'-P-CCNC: 12 U/L (ref 10–44)
ANION GAP SERPL CALC-SCNC: 5 MMOL/L (ref 8–16)
AST SERPL-CCNC: 11 U/L (ref 10–40)
BASOPHILS # BLD AUTO: 0.04 K/UL (ref 0–0.2)
BASOPHILS NFR BLD: 0.3 % (ref 0–1.9)
BILIRUB SERPL-MCNC: 0.2 MG/DL (ref 0.1–1)
BUN SERPL-MCNC: 18 MG/DL (ref 6–20)
CALCIUM SERPL-MCNC: 8.4 MG/DL (ref 8.7–10.5)
CHLORIDE SERPL-SCNC: 106 MMOL/L (ref 95–110)
CO2 SERPL-SCNC: 27 MMOL/L (ref 23–29)
CREAT SERPL-MCNC: 0.7 MG/DL (ref 0.5–1.4)
DIFFERENTIAL METHOD BLD: ABNORMAL
EOSINOPHIL # BLD AUTO: 0.1 K/UL (ref 0–0.5)
EOSINOPHIL NFR BLD: 0.9 % (ref 0–8)
ERYTHROCYTE [DISTWIDTH] IN BLOOD BY AUTOMATED COUNT: 13.2 % (ref 11.5–14.5)
EST. GFR  (NO RACE VARIABLE): >60 ML/MIN/1.73 M^2
GLUCOSE SERPL-MCNC: 90 MG/DL (ref 70–110)
HCT VFR BLD AUTO: 30.3 % (ref 37–48.5)
HGB BLD-MCNC: 9.8 G/DL (ref 12–16)
IMM GRANULOCYTES # BLD AUTO: 0.23 K/UL (ref 0–0.04)
IMM GRANULOCYTES NFR BLD AUTO: 1.6 % (ref 0–0.5)
LYMPHOCYTES # BLD AUTO: 4.9 K/UL (ref 1–4.8)
LYMPHOCYTES NFR BLD: 34.9 % (ref 18–48)
MAGNESIUM SERPL-MCNC: 1.9 MG/DL (ref 1.6–2.6)
MCH RBC QN AUTO: 28.1 PG (ref 27–31)
MCHC RBC AUTO-ENTMCNC: 32.3 G/DL (ref 32–36)
MCV RBC AUTO: 87 FL (ref 82–98)
MONOCYTES # BLD AUTO: 1.4 K/UL (ref 0.3–1)
MONOCYTES NFR BLD: 9.7 % (ref 4–15)
NEUTROPHILS # BLD AUTO: 7.4 K/UL (ref 1.8–7.7)
NEUTROPHILS NFR BLD: 52.6 % (ref 38–73)
NRBC BLD-RTO: 0 /100 WBC
PHOSPHATE SERPL-MCNC: 4.2 MG/DL (ref 2.7–4.5)
PLATELET # BLD AUTO: 363 K/UL (ref 150–450)
PMV BLD AUTO: 9.4 FL (ref 9.2–12.9)
POTASSIUM SERPL-SCNC: 3.9 MMOL/L (ref 3.5–5.1)
PROT SERPL-MCNC: 6.1 G/DL (ref 6–8.4)
RBC # BLD AUTO: 3.49 M/UL (ref 4–5.4)
SODIUM SERPL-SCNC: 138 MMOL/L (ref 136–145)
WBC # BLD AUTO: 14.03 K/UL (ref 3.9–12.7)

## 2024-01-27 PROCEDURE — 63600175 PHARM REV CODE 636 W HCPCS: Performed by: STUDENT IN AN ORGANIZED HEALTH CARE EDUCATION/TRAINING PROGRAM

## 2024-01-27 PROCEDURE — 80053 COMPREHEN METABOLIC PANEL: CPT

## 2024-01-27 PROCEDURE — 63600175 PHARM REV CODE 636 W HCPCS

## 2024-01-27 PROCEDURE — 84100 ASSAY OF PHOSPHORUS: CPT

## 2024-01-27 PROCEDURE — 99232 SBSQ HOSP IP/OBS MODERATE 35: CPT | Mod: ,,, | Performed by: INTERNAL MEDICINE

## 2024-01-27 PROCEDURE — 25000003 PHARM REV CODE 250

## 2024-01-27 PROCEDURE — 83735 ASSAY OF MAGNESIUM: CPT

## 2024-01-27 PROCEDURE — 85025 COMPLETE CBC W/AUTO DIFF WBC: CPT | Performed by: STUDENT IN AN ORGANIZED HEALTH CARE EDUCATION/TRAINING PROGRAM

## 2024-01-27 RX ORDER — HEPARIN SODIUM,PORCINE/PF 10 UNIT/ML
10 SYRINGE (ML) INTRAVENOUS
Status: DISCONTINUED | OUTPATIENT
Start: 2024-01-27 | End: 2024-01-27

## 2024-01-27 RX ORDER — NALOXONE HYDROCHLORIDE 4 MG/.1ML
1 SPRAY NASAL ONCE
Qty: 2 EACH | Refills: 0 | Status: SHIPPED | OUTPATIENT
Start: 2024-01-27 | End: 2024-02-03

## 2024-01-27 RX ORDER — HEPARIN 100 UNIT/ML
5 SYRINGE INTRAVENOUS ONCE
Status: COMPLETED | OUTPATIENT
Start: 2024-01-27 | End: 2024-01-27

## 2024-01-27 RX ORDER — OXYCODONE AND ACETAMINOPHEN 10; 325 MG/1; MG/1
1 TABLET ORAL EVERY 8 HOURS PRN
Qty: 21 TABLET | Refills: 0 | Status: SHIPPED | OUTPATIENT
Start: 2024-01-27 | End: 2024-02-10

## 2024-01-27 RX ADMIN — THERA TABS 1 TABLET: TAB at 08:01

## 2024-01-27 RX ADMIN — HEPARIN 500 UNITS: 100 SYRINGE at 02:01

## 2024-01-27 RX ADMIN — PANTOPRAZOLE SODIUM 40 MG: 40 TABLET, DELAYED RELEASE ORAL at 08:01

## 2024-01-27 RX ADMIN — GABAPENTIN 300 MG: 300 CAPSULE ORAL at 08:01

## 2024-01-27 RX ADMIN — OXYCODONE AND ACETAMINOPHEN 1 TABLET: 10; 325 TABLET ORAL at 12:01

## 2024-01-27 RX ADMIN — PROMETHAZINE HYDROCHLORIDE 25 MG: 25 TABLET ORAL at 09:01

## 2024-01-27 RX ADMIN — MORPHINE SULFATE 4 MG: 4 INJECTION INTRAVENOUS at 02:01

## 2024-01-27 RX ADMIN — OXYCODONE AND ACETAMINOPHEN 1 TABLET: 10; 325 TABLET ORAL at 01:01

## 2024-01-27 RX ADMIN — OXYCODONE AND ACETAMINOPHEN 1 TABLET: 10; 325 TABLET ORAL at 08:01

## 2024-01-27 RX ADMIN — NADOLOL 40 MG: 20 TABLET ORAL at 08:01

## 2024-01-27 RX ADMIN — LOPERAMIDE HYDROCHLORIDE 4 MG: 1 SOLUTION ORAL at 12:01

## 2024-01-27 RX ADMIN — DRONABINOL 5 MG: 2.5 CAPSULE ORAL at 06:01

## 2024-01-27 RX ADMIN — CLONAZEPAM 1 MG: 0.5 TABLET ORAL at 08:01

## 2024-01-27 RX ADMIN — PREDNISONE 40 MG: 20 TABLET ORAL at 08:01

## 2024-01-27 RX ADMIN — LOPERAMIDE HYDROCHLORIDE 4 MG: 1 SOLUTION ORAL at 08:01

## 2024-01-27 NOTE — PLAN OF CARE
Pt started on Lactated Ringers at 50 ml/hr and continue with pain control. Continue to chart output.   Problem: Pain Acute  Goal: Acceptable Pain Control and Functional Ability  Outcome: Ongoing, Progressing  Intervention: Develop Pain Management Plan  Flowsheets (Taken 1/26/2024 1900)  Pain Management Interventions:   care clustered   medication offered   pillow support provided   position adjusted   pain management plan reviewed with patient/caregiver   quiet environment facilitated   relaxation techniques promoted  Intervention: Prevent or Manage Pain  Flowsheets (Taken 1/26/2024 1900)  Sleep/Rest Enhancement:   regular sleep/rest pattern promoted   relaxation techniques promoted   reading promoted   noise level reduced  Sensory Stimulation Regulation:   quiet environment promoted   television on  Bowel Elimination Promotion:   ambulation promoted   adequate fluid intake promoted  Medication Review/Management: medications reviewed  Intervention: Optimize Psychosocial Wellbeing  Flowsheets (Taken 1/26/2024 1900)  Supportive Measures:   relaxation techniques promoted   self-care encouraged   self-reflection promoted   positive reinforcement provided   self-responsibility promoted   verbalization of feelings encouraged  Diversional Activities:   television   smartphone     Problem: Fall Injury Risk  Goal: Absence of Fall and Fall-Related Injury  Outcome: Ongoing, Progressing  Intervention: Identify and Manage Contributors  Flowsheets (Taken 1/26/2024 1900)  Self-Care Promotion:   BADL personal objects within reach   BADL personal routines maintained   independence encouraged   meal set-up provided   safe use of adaptive equipment encouraged  Medication Review/Management: medications reviewed  Intervention: Promote Injury-Free Environment  Flowsheets (Taken 1/26/2024 1900)  Safety Promotion/Fall Prevention:   assistive device/personal item within reach   bed alarm refused   Fall Risk reviewed with patient/family    family to remain at bedside   medications reviewed   nonskid shoes/socks when out of bed   side rails raised x 2     Problem: Adult Inpatient Plan of Care  Goal: Absence of Hospital-Acquired Illness or Injury  Outcome: Ongoing, Progressing  Intervention: Identify and Manage Fall Risk  Flowsheets (Taken 1/26/2024 1900)  Safety Promotion/Fall Prevention:   assistive device/personal item within reach   bed alarm refused   Fall Risk reviewed with patient/family   family to remain at bedside   medications reviewed   nonskid shoes/socks when out of bed   side rails raised x 2  Intervention: Prevent Skin Injury  Flowsheets (Taken 1/26/2024 1900)  Body Position: position changed independently  Skin Protection:   adhesive use limited   skin sealant/moisture barrier applied   skin-to-skin areas padded   skin-to-device areas padded   tubing/devices free from skin contact  Intervention: Prevent and Manage VTE (Venous Thromboembolism) Risk  Flowsheets (Taken 1/26/2024 1900)  Activity Management:   Ambulated -L4   Ambulated to bathroom - L4   Ambulated in room - L4   Arm raise - L1   Ambulated in velasco - L4   Leg kicks - L2   Sitting at edge of bed - L2   Standing - L3   Step forward and back - L3   Step side-to-side along edge of bed - L3   Up in chair - L3   Walking in place - L3   Walk with assistive devise and /or staff member - L3  VTE Prevention/Management:   ambulation promoted   bleeding precations maintained   bleeding risk assessed   bleeding risk factor(s) identified, provider notified   dorsiflexion/plantar flexion performed   ROM (active) performed   ROM (passive) performed  Range of Motion:   active ROM (range of motion) encouraged   ROM (range of motion) performed  Goal: Optimal Comfort and Wellbeing  Outcome: Ongoing, Progressing  Intervention: Monitor Pain and Promote Comfort  Flowsheets (Taken 1/26/2024 1900)  Pain Management Interventions:   care clustered   medication offered   pillow support provided    position adjusted   pain management plan reviewed with patient/caregiver   quiet environment facilitated   relaxation techniques promoted  Intervention: Provide Person-Centered Care  Flowsheets (Taken 1/26/2024 1900)  Trust Relationship/Rapport:   choices provided   questions answered   questions encouraged   care explained   thoughts/feelings acknowledged  Goal: Readiness for Transition of Care  Outcome: Ongoing, Progressing  Intervention: Mutually Develop Transition Plan  Flowsheets (Taken 1/26/2024 1900)  Equipment Currently Used at Home: none  Transportation Anticipated: family or friend will provide  Communicated CHRISTOPH with patient/caregiver: Yes  Do you expect to return to your current living situation?: Yes  Do you have help at home or someone to help you manage your care at home?: Yes  Readmission within 30 days?: No  Do you currently have service(s) that help you manage your care at home?: No  Is the pt/caregiver preference to resume services with current agency: No     Problem: Infection  Goal: Absence of Infection Signs and Symptoms  Outcome: Ongoing, Progressing  Intervention: Prevent or Manage Infection  Flowsheets (Taken 1/26/2024 1900)  Fever Reduction/Comfort Measures: fluid intake increased  Infection Management: aseptic technique maintained  Isolation Precautions: protective     Problem: Adjustment to Illness (Bowel Disease, Inflammatory)  Goal: Optimal Adaptation to Chronic Illness  Outcome: Ongoing, Progressing  Intervention: Support Psychosocial Response to Illness  Flowsheets (Taken 1/26/2024 1900)  Supportive Measures:   relaxation techniques promoted   self-care encouraged   self-reflection promoted   positive reinforcement provided   self-responsibility promoted   verbalization of feelings encouraged     Problem: Diarrhea (Bowel Disease, Inflammatory)  Goal: Diarrhea Symptom Relief  Outcome: Ongoing, Progressing     Problem: Infection (Bowel Disease, Inflammatory)  Goal: Absence of Infection  Signs and Symptoms  Outcome: Ongoing, Progressing  Intervention: Prevent or Manage Infection  Flowsheets (Taken 1/26/2024 1900)  Fever Reduction/Comfort Measures: fluid intake increased  Infection Management: aseptic technique maintained     Problem: Nutrition Impaired (Bowel Disease, Inflammatory)  Goal: Optimal Nutrition  Outcome: Ongoing, Progressing  Intervention: Promote and Optimize Nutrition Intake  Flowsheets (Taken 1/26/2024 1900)  Environmental Support:   calm environment promoted   rest periods encouraged   environmental consistency promoted     Problem: Pain (Bowel Disease, Inflammatory)  Goal: Acceptable Pain Control  Outcome: Ongoing, Progressing  Intervention: Prevent or Manage Pain  Flowsheets (Taken 1/26/2024 1900)  Pain Management Interventions:   care clustered   medication offered   pillow support provided   position adjusted   pain management plan reviewed with patient/caregiver   quiet environment facilitated   relaxation techniques promoted

## 2024-01-27 NOTE — PLAN OF CARE
Patient admitted for Crohn's disease flare. Seen by IBD team during admission with steroid taper initiated. She is feeling much better and feels well hydrated and ready to discharge.  Will discharge home to complete steroid taper and f/u with IBD.   Full discharge summary to follow.

## 2024-01-27 NOTE — PLAN OF CARE
Problem: Pain Acute  Goal: Acceptable Pain Control and Functional Ability  Outcome: Ongoing, Progressing     Problem: Fall Injury Risk  Goal: Absence of Fall and Fall-Related Injury  Outcome: Ongoing, Progressing  Intervention: Identify and Manage Contributors  Flowsheets (Taken 1/27/2024 0332)  Self-Care Promotion: independence encouraged  Medication Review/Management:   medications reviewed   high-risk medications identified     Problem: Adult Inpatient Plan of Care  Goal: Absence of Hospital-Acquired Illness or Injury  Outcome: Ongoing, Progressing  Intervention: Identify and Manage Fall Risk  Flowsheets (Taken 1/27/2024 0332)  Safety Promotion/Fall Prevention:   high risk medications identified   medications reviewed  Goal: Optimal Comfort and Wellbeing  Outcome: Ongoing, Progressing  Intervention: Monitor Pain and Promote Comfort  Flowsheets (Taken 1/27/2024 0332)  Pain Management Interventions:   pain management plan reviewed with patient/caregiver   position adjusted   relaxation techniques promoted   quiet environment facilitated     Problem: Infection  Goal: Absence of Infection Signs and Symptoms  Outcome: Ongoing, Progressing  Intervention: Prevent or Manage Infection  Flowsheets (Taken 1/27/2024 0332)  Isolation Precautions: precautions maintained     Problem: Adjustment to Illness (Bowel Disease, Inflammatory)  Goal: Optimal Adaptation to Chronic Illness  Outcome: Ongoing, Progressing     Problem: Infection (Bowel Disease, Inflammatory)  Goal: Absence of Infection Signs and Symptoms  Outcome: Ongoing, Progressing     Problem: Nutrition Impaired (Bowel Disease, Inflammatory)  Goal: Optimal Nutrition  Outcome: Ongoing, Progressing  Intervention: Promote and Optimize Nutrition Intake  Flowsheets (Taken 1/27/2024 0332)  Environmental Support: calm environment promoted     Problem: Pain (Bowel Disease, Inflammatory)  Goal: Acceptable Pain Control  Outcome: Ongoing, Progressing

## 2024-01-28 NOTE — PLAN OF CARE
[facility] Med Surg  Discharge Final Note    Primary Care Provider: Luis Madden DO    Expected Discharge Date: 1/27/2024    Final Discharge Note (most recent)       Final Note - 01/27/24 1819          Final Note    Assessment Type Final Discharge Note     Anticipated Discharge Disposition Home or Self Care        Post-Acute Status    Post-Acute Authorization Other     Other Status No Post-Acute Service Needs                     Important Message from Medicare             Contact Info       Luis Madden DO   Specialty: Family Medicine   Relationship: PCP - General    [facility]  [address] [place], [place] [id number]   Phone: [phone]       Next Steps: Follow up    Instructions:  sent a message to pt's primary care office to make contact with pt to schedule her hospital f/u visit.          Future Appointments   Date Time Provider Department Center   1/30/2024 10:00 AM Peace Garcia MD [facility]   1/30/2024 12:00 PM CHAIR 02, [facility] [place]   1/30/2024  3:00 PM Dorothy Basurto, PT [facility] OP [facility] [place]   2/1/2024  3:00 PM Dorothy Basurto, PT EL OP [facility] [place]   2/2/2024  1:45 PM Luis Madden DO [facility] IM [facility]   3/4/2024  3:30 PM Sharon Babb MD [facility] SPORTS [place]   4/16/2024  3:00 PM Natali Soto MD [facility] HEPAT [facility]

## 2024-01-29 ENCOUNTER — PATIENT MESSAGE (OUTPATIENT)
Dept: GASTROENTEROLOGY | Facility: CLINIC | Age: 44
End: 2024-01-29
Payer: COMMERCIAL

## 2024-01-29 RX ORDER — ESTRADIOL 0.03 MG/D
1 PATCH TRANSDERMAL WEEKLY
Qty: 4 PATCH | Refills: 11 | Status: SHIPPED | OUTPATIENT
Start: 2024-01-29 | End: 2025-01-28

## 2024-01-29 NOTE — DISCHARGE SUMMARY
[facility] Medicine  Discharge Summary      Patient Name: Ivy Salgado  MRN: [id number]  IGNACIO: [id number]  Patient Class: IP- Inpatient  Admission Date: 1/16/2024  Hospital Length of Stay: 11 days  Discharge Date and Time:  01/27/2024  Attending Physician: Madison att. providers found   Discharging Provider: Lb Meadows DO  Primary Care Provider: Luis Madden DO  [facility] Medicine Team: [facility] 5 Lb Meadows DO  Primary Care Team: [facility] 5    HPI:   Patient is a 42 yo female with PMH of Crohn's disease s/p ileostomy, GERD, and ARPITA who presented from GI clinic at the request for admission from Dr. Peace Garcia for ongoing high ileostomy output. Of note, she was recently hospitalized 12/31-01/07 for similar presentation. She is reporting generalized 8/10 abdominal pain, nausea, high ileostomy output, decreased appetite,and heartburn. She states her symptoms are similar the symptoms she used to have with Crohn's flares in the past, but she reports she hasn't had a flare since 2020. She reports normally emptying her ileostomy bag 2-3 times after meals, but in the last few weeks, she has had to continuously empty the bag as high as 12 times even when not eating meals. Her appetite has been decreased, reporting only eating half of her normal intake. During her recent admission, she had a formal workup with EGD/ileoscopy and stool studies that were largely unrevealing aside from multiple ulcers in the prepyloric region. She denies fever, chills, chest pain, SOB, palpitations, headache, vision changes, skin changes, vomiting, urinary symptoms, blood in stool, weight loss, or weakness.     She arrived to [facility] as a direct admission with plans to be started on IVF, anti-diarrheals, pain medications, and plans for stool studies and repeat ileoscopy. She was admitted to hospital medicine for symptomatic care and management of high ileostomy output and potential Crohn's flare with consult to IBD  team.    Procedure(s) (LRB):  ILEOSCOPY (N/A)      Hospital Course:   Patient was admitted as a direct admit from GI clinic due to high ileostomy output. She was treated with continuous IVF, anti-diarrheals, and pain medications as needed. GI/IBD were consulted, recommended continued supportive treatment and IV Solumedrol. Initially there were no plans for repeat ileoscopy as she recently completed one during last admission, biopsy from which showed active enteritis with ulceration, however patient with continued pain and high output. Ileoscopy completed on 01/24 and was significant only for a solitary ulcer 19 cm proximal to the stoma. CDiff studies negative, E. Coli Ag negative, and Stool culture with no growth. Steroids were transitioned to Prednisone 40 mg daily and then to be tapered down. On 01/27, patient was medically cleared for discharge with better appetite, improved pain and improved ileostomy output.     Physical Exam  Vitals and nursing note reviewed.   Constitutional:       General: She is not in acute distress.     Appearance: Normal appearance. She is normal weight. She is not ill-appearing.   HENT:      Head: Normocephalic and atraumatic.      Mouth/Throat:      Mouth: Mucous membranes are moist.      Pharynx: Oropharynx is clear.   Eyes:      General: No scleral icterus.     Extraocular Movements: Extraocular movements intact.      Conjunctiva/sclera: Conjunctivae normal.      Pupils: Pupils are equal, round, and reactive to light.   Cardiovascular:      Rate and Rhythm: Normal rate and regular rhythm.      Pulses: Normal pulses.      Heart sounds: Normal heart sounds.   Pulmonary:      Effort: Pulmonary effort is normal. No respiratory distress.      Breath sounds: Normal breath sounds. No wheezing.   Abdominal:      General: Bowel sounds are normal. There is no distension.      Palpations: Abdomen is soft.      Tenderness: There is abdominal tenderness. There is no guarding or rebound.       Comments: Ileostomy bag in place, no erythema or signs of leakage. Site is clean, dry and intact   Musculoskeletal:         General: No tenderness.      Right lower leg: No edema.      Left lower leg: No edema.   Skin:     General: Skin is warm and dry.      Coloration: Skin is not pale.      Findings: No erythema or rash.   Neurological:      General: No focal deficit present.      Mental Status: She is alert and oriented to person, place, and time. Mental status is at baseline.      Cranial Nerves: No cranial nerve deficit.   Psychiatric:         Mood and Affect: Mood normal.         Behavior: Behavior normal.   Goals of Care Treatment Preferences:  Code Status: Full Code    Living Will: Yes              Consults:   Consults (From admission, onward)          Status Ordering Provider     Inpatient consult to Midline team  Once        Provider:  (Not yet assigned)    Completed ALISON OG     Inpatient consult to Gastroenterology  Once        Provider:  (Not yet assigned)    Completed LIZZIE SAMUEL            No new Assessment & Plan notes have been filed under this hospital service since the last note was generated.  Service: Hospital Medicine    Final Active Diagnoses:    Diagnosis Date Noted POA    PRINCIPAL PROBLEM:  Crohn's disease of both small and large intestine [K50.80] 02/21/2017 Yes     Chronic    Terminal ileitis of small intestine [K50.00] 01/24/2024 Yes    Mild malnutrition [E44.1] 01/24/2024 Yes    S/P shoulder surgery [Z98.890] 12/04/2023 Not Applicable    Gastroesophageal reflux disease [K21.9] 05/28/2019 Yes     Chronic    Nausea [R11.0] 02/16/2018 Yes    High output ileostomy [R19.8, Z93.2] 10/13/2016 Not Applicable    Abdominal pain [R10.9] 10/12/2016 Yes    Generalized anxiety disorder [F41.1] 09/18/2013 Yes     Chronic    S/P ileostomy [Z93.2] 07/09/2012 Not Applicable     Chronic      Problems Resolved During this Admission:       Discharged Condition: stable    Disposition: Home or Self  Care    Follow Up:   Follow-up Information       Luis Madden, DO Follow up.    Specialty: Family Medicine  Why:  sent a message to pt's primary care office to make contact with pt to schedule her hospital f/u visit.  Contact information:  [address]  [address]  [facility] [address]  [phone]                           Patient Instructions:      COMPREHENSIVE METABOLIC PANEL   Standing Status: Future Standing Exp. Date: 03/26/25   Order Comments: Labs to be drawn at [facility] on 2nd floor of [facility] before GI clinic visit     Magnesium   Standing Status: Future Standing Exp. Date: 03/26/25   Order Comments: Lab to be drawn at [facility] on [facility]nd floor [facility] before GI clinic visit     PHOSPHORUS   Standing Status: Future Standing Exp. Date: 03/26/25   Order Comments: Lab to be drawn at [facility] on [facility]nd [facility] before GI clinic visit       Significant Diagnostic Studies: N/A    Pending Diagnostic Studies:       Procedure Component Value Units Date/Time    Calprotectin, Stool [[id number]] Collected: 01/24/24 1327    Order Status: Sent Lab Status: In process Updated: 01/24/24 1424    Specimen: Stool     Somatostatin [[id number]] Collected: 01/24/24 0416    Order Status: Sent Lab Status: In process Updated: 01/24/24 0431    Specimen: Blood     Specimen to Pathology, Surgery Gastrointestinal tract [[id number]] Collected: 01/24/24 1010    Order Status: Sent Lab Status: In process Updated: 01/24/24 1117    Specimen: Tissue     Vasoactive Intes. Polypep 8150 [[id number]] Collected: 01/23/24 0302    Order Status: Sent Lab Status: In process Updated: 01/23/24 0319    Specimen: Blood            Medications:  Reconciled Home Medications:      Medication List        START taking these medications      diphenoxylate-atropine 2.5-0.025 mg/5 ml 2.5-0.025 mg/5 mL liquid  Commonly known as: LOMOTIL  Take 2.5-5 ml up to 4 times a day - dose might change at visit on 1/30     loperamide 1 mg/7.5 mL  solution  Commonly known as: IMODIUM  Take 30 mLs (4 mg total) by mouth 4 (four) times daily.  Replaces: loperamide 2 mg capsule     oxyCODONE-acetaminophen  mg per tablet  Commonly known as: PERCOCET  Take 1 tablet by mouth every 8 (eight) hours as needed for Pain.     predniSONE 10 MG tablet  Commonly known as: DELTASONE  Take 4 tablets (40 mg total) by mouth once daily for 3 days, THEN 3 tablets (30 mg total) once daily for 5 days, THEN 2 tablets (20 mg total) once daily for 5 days, THEN 1 tablet (10 mg total) once daily for 5 days.  Start taking on: January 27, 2024            CHANGE how you take these medications      clonazePAM 1 MG tablet  Commonly known as: KlonoPIN  Take 1 tablet (1 mg total) by mouth 2 (two) times daily.  What changed:   when to take this  reasons to take this            CONTINUE taking these medications      cyclobenzaprine 10 MG tablet  Commonly known as: FLEXERIL  Take 1 tablet (10 mg total) by mouth every evening as needed for muscle pain     droNABinol 5 MG capsule  Commonly known as: MARINOL  Take 1 capsule (5 mg total) by mouth 2 (two) times daily before meals.     fluconazole 150 MG Tab  Commonly known as: DIFLUCAN  Take 1 tablet (150 mg total) by mouth every 72 hours as needed (vaginal yeast).     gabapentin 300 MG capsule  Commonly known as: NEURONTIN  Take 1 capsule (300 mg total) by mouth 2 (two) times daily.     mirtazapine 30 MG disintegrating tablet  Commonly known as: REMERON SOL-TAB  Take 1 tablet (30 mg total) by mouth nightly.     multivitamin per tablet  Commonly known as: THERAGRAN  Take 1 tablet by mouth once daily.     nadoloL 40 MG tablet  Commonly known as: CORGARD  Take 1 tablet (40 mg total) by mouth once daily. Patient needs appointment before next refill.     pantoprazole 40 MG tablet  Commonly known as: PROTONIX  Take 1 tablet (40 mg total) by mouth 2 (two) times daily.     promethazine 25 MG tablet  Commonly known as: PHENERGAN  Take 1 tablet (25 mg  total) by mouth every 6 (six) hours as needed for Nausea.     tamsulosin 0.4 mg Cap  Commonly known as: FLOMAX  Take 1 capsule (0.4 mg total) by mouth once daily.     triamcinolone acetonide 0.1% 0.1 % cream  Commonly known as: KENALOG  Apply topically 2 (two) times daily.     valACYclovir 500 MG tablet  Commonly known as: VALTREX  Take 1 tablet (500 mg total) by mouth once daily.     vedolizumab 300 mg Solr injection  Commonly known as: ENTYVIO  Inject 300 mg into the vein.     zolpidem 10 mg Tab  Commonly known as: AMBIEN  Take 1 tablet (10 mg total) by mouth every evening.            STOP taking these medications      FLUARIX QUAD 8620-3307 (PF) 60 mcg (15 mcg x 4)/0.5 mL Syrg  Generic drug: flu vacc [id number] 6mos up(PF)     loperamide 2 mg capsule  Commonly known as: IMODIUM  Replaced by: loperamide 1 mg/7.5 mL solution            ASK your doctor about these medications      naloxone 4 mg/actuation Spry  Commonly known as: NARCAN  1 spray (4 mg total) by Nasal route once. for 1 dose as needed as directed on package  Ask about: Should I take this medication?              Indwelling Lines/Drains at time of discharge:   Lines/Drains/Airways       Central Venous Catheter Line  Duration                  PowerPort A Cath Single Lumen Atrial Left -- days              Drain  Duration                  Ileostomy 06/16/12 0000 RLQ 4244 days                    Time spent on the discharge of patient: 35 minutes         Lb Meadows DO  Department of Hospital Medicine  [facility] Surg

## 2024-01-30 ENCOUNTER — OFFICE VISIT (OUTPATIENT)
Dept: GASTROENTEROLOGY | Facility: CLINIC | Age: 44
End: 2024-01-30
Payer: COMMERCIAL

## 2024-01-30 ENCOUNTER — CLINICAL SUPPORT (OUTPATIENT)
Dept: REHABILITATION | Facility: HOSPITAL | Age: 44
End: 2024-01-30
Payer: COMMERCIAL

## 2024-01-30 VITALS
BODY MASS INDEX: 21.02 KG/M2 | HEART RATE: 73 BPM | DIASTOLIC BLOOD PRESSURE: 81 MMHG | HEIGHT: 61 IN | OXYGEN SATURATION: 99 % | WEIGHT: 111.31 LBS | SYSTOLIC BLOOD PRESSURE: 119 MMHG | TEMPERATURE: 98 F

## 2024-01-30 DIAGNOSIS — R19.7 DIARRHEA, UNSPECIFIED TYPE: ICD-10-CM

## 2024-01-30 DIAGNOSIS — G89.29 CHRONIC LEFT SHOULDER PAIN: ICD-10-CM

## 2024-01-30 DIAGNOSIS — M25.612 DECREASED RANGE OF MOTION OF LEFT SHOULDER: Primary | ICD-10-CM

## 2024-01-30 DIAGNOSIS — R19.8 HIGH OUTPUT ILEOSTOMY: Primary | ICD-10-CM

## 2024-01-30 DIAGNOSIS — Z93.2 HIGH OUTPUT ILEOSTOMY: Primary | ICD-10-CM

## 2024-01-30 DIAGNOSIS — Z98.890 S/P SHOULDER SURGERY: ICD-10-CM

## 2024-01-30 DIAGNOSIS — K50.00 CROHN'S DISEASE OF SMALL INTESTINE WITHOUT COMPLICATION: Chronic | ICD-10-CM

## 2024-01-30 DIAGNOSIS — M25.512 CHRONIC LEFT SHOULDER PAIN: ICD-10-CM

## 2024-01-30 LAB — CALPROTECTIN STL-MCNT: 117.6 MCG/G

## 2024-01-30 PROCEDURE — 90677 PCV20 VACCINE IM: CPT | Mod: S$GLB,,, | Performed by: INTERNAL MEDICINE

## 2024-01-30 PROCEDURE — 3008F BODY MASS INDEX DOCD: CPT | Mod: CPTII,S$GLB,, | Performed by: INTERNAL MEDICINE

## 2024-01-30 PROCEDURE — 99215 OFFICE O/P EST HI 40 MIN: CPT | Mod: 25,S$GLB,, | Performed by: INTERNAL MEDICINE

## 2024-01-30 PROCEDURE — 1159F MED LIST DOCD IN RCRD: CPT | Mod: CPTII,S$GLB,, | Performed by: INTERNAL MEDICINE

## 2024-01-30 PROCEDURE — 97140 MANUAL THERAPY 1/> REGIONS: CPT

## 2024-01-30 PROCEDURE — 3079F DIAST BP 80-89 MM HG: CPT | Mod: CPTII,S$GLB,, | Performed by: INTERNAL MEDICINE

## 2024-01-30 PROCEDURE — 97112 NEUROMUSCULAR REEDUCATION: CPT

## 2024-01-30 PROCEDURE — 1111F DSCHRG MED/CURRENT MED MERGE: CPT | Mod: CPTII,S$GLB,, | Performed by: INTERNAL MEDICINE

## 2024-01-30 PROCEDURE — 3074F SYST BP LT 130 MM HG: CPT | Mod: CPTII,S$GLB,, | Performed by: INTERNAL MEDICINE

## 2024-01-30 PROCEDURE — 1160F RVW MEDS BY RX/DR IN RCRD: CPT | Mod: CPTII,S$GLB,, | Performed by: INTERNAL MEDICINE

## 2024-01-30 PROCEDURE — 3044F HG A1C LEVEL LT 7.0%: CPT | Mod: CPTII,S$GLB,, | Performed by: INTERNAL MEDICINE

## 2024-01-30 PROCEDURE — 97530 THERAPEUTIC ACTIVITIES: CPT

## 2024-01-30 PROCEDURE — 90471 IMMUNIZATION ADMIN: CPT | Mod: S$GLB,,, | Performed by: INTERNAL MEDICINE

## 2024-01-30 RX ORDER — NADOLOL 40 MG/1
40 TABLET ORAL DAILY
Qty: 30 TABLET | Refills: 0 | OUTPATIENT
Start: 2024-01-30 | End: 2025-01-29

## 2024-01-30 RX ORDER — SODIUM CHLORIDE 0.9 % (FLUSH) 0.9 %
10 SYRINGE (ML) INJECTION
Status: CANCELLED | OUTPATIENT
Start: 2024-01-30

## 2024-01-30 RX ORDER — CYCLOBENZAPRINE HCL 10 MG
10 TABLET ORAL NIGHTLY
Qty: 15 TABLET | Refills: 0 | Status: SHIPPED | OUTPATIENT
Start: 2024-01-30 | End: 2024-03-04 | Stop reason: SDUPTHER

## 2024-01-30 RX ORDER — HEPARIN 100 UNIT/ML
500 SYRINGE INTRAVENOUS
Status: CANCELLED | OUTPATIENT
Start: 2024-01-30

## 2024-01-30 NOTE — Clinical Note
Caller: Blanca Mahmood    Relationship: Emergency Contact    Best call back number: [phone]    Requested Prescriptions:   Requested Prescriptions     Pending Prescriptions Disp Refills   • divalproex (DEPAKOTE ER) 250 MG 24 hr tablet 360 tablet 1     Sig: Take 2 tablets by mouth 2 (Two) Times a Day.        Pharmacy where request should be sent: [facility] - [address] [facility] [phone] [facility] [phone] FX     Additional details provided by patient:     Does the patient have less than a 3 day supply:  [] Yes  [x] No    Would you like a call back once the refill request has been completed: [] Yes [] No    If the office needs to give you a call back, can they leave a voicemail: [] Yes [] No    Hipolito Staples Rep   12/29/22 12:26 EST            Please [facility] and again with 2nd entyvio infusion and then 1 liter weekly until her 3rd entyvio infusion

## 2024-01-30 NOTE — PATIENT INSTRUCTIONS
- prednisone 40 mg/d (30 mg/d tomorrow- 1/31, 20 mg/d 2/5, 10 mg/d 2/10, stop 2/15)  - measure your output 3 times/week and email me at the end of each week with update  - try lomotil 0.5 mg daily for 3 days and if tolerated increase every few days to a goal of 0.5 mg po TID and if tolerated then you can increase every few weekdays  - we will set you up with weekly 1 liter of IVFs

## 2024-01-30 NOTE — PROGRESS NOTES
[facility]             Inflammatory Bowel Disease   Follow-up  Note              TODAY'S VISIT DATE:  1/30/2024    Chief Complaint:   Chief Complaint   Patient presents with    Crohn's Disease     PCP: Luis Madden    Previous History:  Ivy Salgado is a 43 y.o. female with Crohn's disease with end ileostomy and recurrent ileitis, recurrent C diff (2014 x 2), ARPITA/depression, arthritis, h/o abnormal pap smear- LYLA I, nephrolithiasis-nonobstructive, GERD, long QTc syndrome (Type III, on nadolol), s/p SHELLIE (2015) for recurrent ovarian cysts and endometriosis, early evolving melanoma in situ (buttocks and para-stomal site, excised in 2018 and 2019 respectively), history of genital HSV, imuran induced pancreatitis, pancreatic tail cyst, recurrent UTI (h/o ESBL 2018), multiple thyroid nodules, osteopenia, left femoral head chronic avascular necrosis, history bacterial vaginosis, h/o abnormal LFTs (elevated ALP since 2016), family history of colon cancer.  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  She tried multiple meds including imuran (pancreatitis, remicade (secondary nonresponder), humira (DIL), MTX (leukopenia).  She met Dr. Cameron initially in GI clinic at [facility] 5/2009 at which time she had some diarrhea and on pred 30 mg/d. Colonoscopy 6/2009 significant for diffuse proctosigmoiditis with remainder of colon normal and end to end ileocolonic anastomsis with inflammation. Placed on rowasa enemas but too expensive so switched to steroid enemas.  Flex sig 10/2009 ongoing proctosigmoiditis.  In 2010 she had rectal bleeding that improved with proctofoam and started cimzia with improvement of symptoms.  In 4/2010 she had CT showing circumferential bowel wall thickening of the distal descending and sigmoid with small caliber origin of celiac artery  and referred to vascular for median arcuate ligament syndrome.  Colonoscopy c/w moderate proctosigmoiditis and in 5/2010 she continued proctofoam BID and took extra dose of cimzia to induce remission.  In 7/2010 flex sig confirmed active left sided colitis and she started prednisone in 3/2011 with repeat colonoscopy 8/2011 c/w ongoing rectosigmoid inflammation with mild mucosal ulcer the transverse colon with ileocolonic anastomotic stricture and normal TI. In 9/2011 pt was on canasa, cimzia and prednisone taper.  In April/May 2012 pt hospitalized and CT c/w sigmoid wall thickening and colonoscopy confirmed distal 30 cm with moderate inflammation with ileocolonic anastomotic ulcers and normal TI.  In 6/2012 Dr Parsons proceeded with completion proctocolectomy with end ileostomy with pathology confirming transmural active inflammation with abscess c/w Crohn's disease.  Post-operatively she had peristomal ulcer and perineal wounds. In 1/2013 patient had non-healing perineal wound S/P debridement  with steroids and treated with cipro. In 8/2013 she had EUA with debridement and fulguration of non-healing perineal wound.  In 8/2013 pt had epigastric abd pain and EGD c/w benign gastric polyp, labs normal, CT c/w short segment WB thickening involving ileum proximal to the stoma and resolution of right adnexal mass.  In 9/2013 ileoscopy through stoma significant for segment mucosa at 5 cm proximal to stoma mild congested, eroded, ulcerated area of 6-30 cm and mucosal distal is normal. CT A/p 1/2024 c/w 2 separate segments of SB proximal to the ostomy and near staple row in RLQ with mild enhancement and wall thickening c/w mild inflammatory changes and segment of bowel forming ostomy.  SB enteroscopy 2/2014 significant for single 2 mm ulcer in the proximal ileum.  In 2014 she had 2 hospitalizations for high ileostomy output with was treated with prednisone with taper.  In 10/2014 CT showed few mild dilated loops SB in  anterior right pelvis and loop with surgical suture line abuts anterior pelvic wall and possible adhesion. In 10/2014 ileoscopy through stomal normal.  In 1/2015 pt had focal segment of mild distal SB dilation and C diff positive and pt treated antibiotics followed by probiotics and resumed cimzia. Dr. Cameron then referred patient to Dr. Parish Ross in 2015 and he mentions recurrent C diff, recurrent SBOs likely related to adhesions. In 2015 she had SHELLIE/BSO with bladder repair and due to this missed one dose of cimzia and then resumed 5/2015. In 1/2016 she had partial SBO due to adhesions and UGI/SBFT and CT did not show fixed obstruction. In 10/2016 she reported to Dr. Ross postprandial nausea and epigastric pain, weight loss, fatigue, low grade fever and watery stool output and prednisone helped symptoms but not as good response as past. She was off of cimzia 8-9/2016 though sx occurred prior to holding cimzia. Ileoscopy through stoma 10/2016 significant for normal 15 cm of ileum examined. Overall Dr. Ross felt that her symptoms responded well to prednisone and restarting cimzia. She presented to her OV 1/2017 with increased ileostomy output with C diff negative with symptoms ongoing and MRE 5/2017 c/w long segment of diffuse wall thickening and mucosal enhancement involving the distal ileum. CT A/P 6/2017 significant for left ovarian cyst, mild biliary dilation stable, hypodensities and punctate bilateral renal calcifications.  Ileoscopy through stoma 8/2017 c/w normal ileum from stoma to 15-20 cm. CT A/P 11/2017 significant for righ adnexal mass abutting theright external iliac vein and pelvic US recommended, bilateral renal hypodensities and calcifications. MRE 11/2017 c/w mild questionable ileal inflammation and luminal narrowing involving short segment of SB within the right hemipelvis with mild dilation and upstream bowel 2.5 cm with findings concerning for developing stricture. She stopped cimzia 6/2017  and started stelara 7/11/2017 though discontinued in 1/2018 due to rash.  Due to symptoms pt was started in 1/2018 on prednisone 10 mg/d, bentyl. In 2/2018 CT A/P c/w bilateral nonobstructing renal calculi, mild bilateral cortical scarring of lower renal poles. MRE 9/2018 significant for short segment of distal ileum with scattered regions of non uniform wall thickening and possible additional short segment of proximal jejunum with mild wall thickening. In fall 2018 she was placed on entocort though due to fast transit was opening capsule and taking this. CT A/P 12/2018 c/w several bilateral renal hypodensities c/w cysts, nonobstructive bilateral nephrolithiasis, right adnexal complex cyst. She started entyvio 11/20/18 and due to recurrent UTIs and palpitations (dx QT prolongation and started on beta blocker) so discontinued in 10/27/20. MRE 5/15/19 significant for right adnexal cystic lesion, large left adnexal cystic lesion.  On 5/30/19 patient had exploratory laparotomy with lysis of adhesions, BSO/mass resection. CT A/P 6/2019 significant for nonspecific rim enhancing fluid collection, mild left hydroureteronephrosis. In 2019 there were several mos she held entyvio due to gyn and melanoma surgery. MRE 5/2020 significant for small non enhancing fluid collection within presacral region superior to the vaginal cuff, right sided pelvocaliectasis, bilateral cortical renal cysts, left femoral head chronic avascular necrosis. CT A/P 9/2020 c/w mild left hydrouriteronephrosis due to distal left ureter stone with ass urothelial thickening and enhancement.  MRE 4/2021 right ovarian cyst likely hemorrhagic cyst.  In 1/2022 patient was placed by Dr. Ross on pantoprazole 40 mg BID. For joint pains and chronic abdominal pain Dr. Ross treated her with gabapentin for several years. In 1/2023 she had multiple intrarenal stones.  Repeat CT 10/2023 small non-obstructing renal stones. She was hospitalized 12/31/23-1/7/24 for high  ileostomy output with stool studies neg for infection.  Elevated inflammatory markers (ESR 94, CRP 22.8), stool caprotectin 281. CT A/P showed slow flow through few mid to distal SB loops noting venous vascular engorgement about these loops and wall hyperemia. On 1/3/24 she had EGD c/w mild HP neg gastritis and ileoscopy through stoma with about 6 apthous ulcers in the distal 20 cm of the ileum and biopsies c/w active inflammation.  Pt had elevated LFTs (peaked 1/4/24 ///TB normal).  Hepatology consulted and serological workup negative and subsequent LFTs normalized with no intervention. Abdominal US revealed mildly dilated bile ducts in the central right hepatic lobe and MRCP c/w no evidence of biliary obstruction, punctate cystic focus in the pancreas tail likely side IPMN and f/u in 1 year recommended.  She was treated with antidiarrheals (imodium helped but lomotil caused palpitations) and IVFs. Lotronex was being considered but due to prolonged QT unable to proceed with this. Due to mild ileitis plan for outpatient entyvio.  Since her discharge from hospital she had continued high ileostomy output and dehydration and we recommended that she return for hospitalization but pt did not wish to go to ER.  She continued with high ileostomy output up to 8 liters/day but if fasting down to 4 liters/day despiate taking imodium 1 tab QID.  She is not taking lomotil due to palpitations.  At her OV 1/16/24 due to high ileostomy output and dehydration I proceeded with hospital admission 1/16/24-1/27/24.     Interval History:  - current IBD meds: prednisone 40 mg/d (30 mg/d tomorrow and then plan on 20 mg/d 2/5, 10 mg/d 2/10, off 2/15), Entyvio q 8 weeks (11/20/18- 10/27/20- stopped due to UTIs and palpitations, reinduced 1/30/24)  - antidiarrheals: liquid imodium 2 mg QID, tried lomotil Saturday with 0.5 mg with no SE  - ileostomy output- 1.4 liters/day since hospital stay  - Hospital admission  -24 for high ileostomy output treated with continuous IVF, anti-diarrheals, and pain medications as needed. GI/IBD were consulted, recommended continued supportive treatment and IV Solumedrol. Due to ongoing high ileostomy output repeat stool calprotectin done on 24 93.9 though unclear accuracy given some granulation tissue on stoma could influence this test. On 24 stool calprotectin 117.6, ileoscopy through stoa with one small apthous ulcer 19 cm proximal to stoma. Stool studies neg for infection.  Pt then discharged home on liquid imodium 2 tabs QID, prednisone 40 mg daily with taper. She overall had significant improvement of all symptoms  - NSAID use: No  - Narcotic use: No  - Alternative/complementary meds for IBD: No     Prior Pertinent Surgeries:   surgery with SB and 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       Last pertinent Endoscopy/Imagin23 CT A/P slow flow through few mid to distal SB loops noting venous vascular engorgement about these loops and wall hyperemia  1/3/24 EGD: normal except for gastric erythema, bx c/w mild HP neg gastritis   1/3/24 ileoscopy through stoma:  6 apthous ulcers in the distal 20 cm of the ileum and biopsies c/w active inflammation  1/3/24 abd US:  mildly dilated bile ducts in the central right hepatic lobe   24 MRCP c/w no evidence of biliary obstruction, punctate cystic focus in the pancreas tail likely side IPMN     Therapeutic Drug Monitoring Labs:  None     Prior IBD Therapies:  Proctofoam - partially effective  imuran (pancreatitis)  MTX (leukopenia)  Remicade- secondary nonresponder  Humira- discontinued due to  "drug induced lupus due to joint pains  Entocort- fall 2018, broke open capsule when taking- not sure if effective   Cimzia 9900-0299- secondary nonresponder  Stelara (7/11/17-1/2018)- discontinued due to rash  Canasa ineffective     Vaccinations:  Lab Results   Component Value Date    HEPBSAB >1000.00 01/02/2024    HEPBSAB Reactive 01/02/2024     No results found for: "HEPBSURFABQU"  Lab Results   Component Value Date    HEPAIGG Non-reactive 01/02/2024     Lab Results   Component Value Date    VARICELLAZOS 883.00 01/02/2024    VARICELLAINT Positive 01/02/2024     Immunization History   Administered Date(s) Administered    COVID-19, MRNA, LN-S, PF (Pfizer) (Purple Cap) 12/21/2020, 01/13/2021, 09/07/2021    Hepatitis B (recombinant) Adjuvanted, 2 dose 12/26/2019, 01/23/2020    Influenza 09/18/2013, 10/25/2022    Influenza - Quadrivalent - PF *Preferred* (6 months and older) 09/18/2013, 10/04/2016, 11/17/2017, 09/13/2018, 10/02/2019, 09/10/2020, 09/24/2021, 10/25/2022, 10/09/2023    Influenza Split 09/18/2013    Pneumococcal Conjugate - 13 Valent 06/28/2017    Pneumococcal Polysaccharide - 23 Valent 08/19/2015    Td (ADULT) 06/28/2013    Tdap 12/29/2019     Influenza:  yearly, UTD  PCV 20: today  Tetanus (TdaP): 12/2029  HPV: declined  Meningococcal: NA  Hepatitis A:  recommended   MMR (live vaccine):  immune  Shingrix: recommended     Review of Systems   Constitutional:  Negative for chills, fever and weight loss.   HENT:          No oral ulcers, dysphagia, oral thrush   Eyes:  Negative for blurred vision, pain and redness.   Respiratory:  Negative for cough and shortness of breath.    Cardiovascular:  Negative for chest pain.   Gastrointestinal:  Positive for abdominal pain, heartburn and nausea. Negative for vomiting.   Genitourinary:  Negative for dysuria and hematuria.   Musculoskeletal:  Negative for back pain and joint pain.   Skin:  Negative for rash.   Psychiatric/Behavioral:  Negative for depression. The " patient is nervous/anxious. The patient does not have insomnia.      All Medical History/Surgical History/Family History/Social History/Allergies have been reviewed and updated in EMR    Outpatient Medications Marked as Taking for the 1/30/24 encounter (Office Visit) with Peace Garcia MD   Medication Sig Dispense Refill    clonazePAM (KLONOPIN) 1 MG tablet Take 1 tablet (1 mg total) by mouth 2 (two) times daily. 60 tablet 2    diphenoxylate-atropine 2.5-0.025 mg/5 ml (LOMOTIL) 2.5-0.025 mg/5 mL liquid Take 2.5-5 ml up to 4 times a day - dose might change at visit on 1/30 (Patient taking differently: 4 (four) times daily as needed. Take 2.5-5 ml up to 4 times a day - dose might change at visit on 1/30) 300 mL 0    droNABinol (MARINOL) 5 MG capsule Take 1 capsule (5 mg total) by mouth 2 (two) times daily before meals. 60 capsule 1    estradiol 0.025 mg/24 hr 0.025 mg/24 hr Place 1 patch onto the skin once a week. 4 patch 11    gabapentin (NEURONTIN) 300 MG capsule Take 1 capsule (300 mg total) by mouth 2 (two) times daily. 60 capsule 11    loperamide (IMODIUM) 1 mg/7.5 mL solution Take 30 mLs (4 mg total) by mouth 4 (four) times daily. 3600 mL 0    mirtazapine (REMERON SOL-TAB) 30 MG disintegrating tablet Take 1 tablet (30 mg total) by mouth nightly. 30 tablet 0    multivitamin (THERAGRAN) per tablet Take 1 tablet by mouth once daily.      nadoloL (CORGARD) 40 MG tablet Take 1 tablet (40 mg total) by mouth once daily. Patient needs appointment before next refill. 30 tablet 0    naloxone (NARCAN) 4 mg/actuation Spry 1 spray (4 mg total) by Nasal route once. for 1 dose as needed as directed on package 2 each 0    oxyCODONE-acetaminophen (PERCOCET)  mg per tablet Take 1 tablet by mouth every 8 (eight) hours as needed for Pain. 21 tablet 0    pantoprazole (PROTONIX) 40 MG tablet Take 1 tablet (40 mg total) by mouth 2 (two) times daily. 60 tablet 12    predniSONE (DELTASONE) 10 MG tablet Take 4 tablets (40 mg  "total) by mouth once daily for 3 days, THEN 3 tablets (30 mg total) once daily for 5 days, THEN 2 tablets (20 mg total) once daily for 5 days, THEN 1 tablet (10 mg total) once daily for 5 days. 42 tablet 0    promethazine (PHENERGAN) 25 MG tablet Take 1 tablet (25 mg total) by mouth every 6 (six) hours as needed for Nausea. 40 tablet 0    valACYclovir (VALTREX) 500 MG tablet Take 1 tablet (500 mg total) by mouth once daily. 90 tablet 3    vedolizumab (ENTYVIO) 300 mg SolR injection Inject 300 mg into the vein.      zolpidem (AMBIEN) 10 mg Tab Take 1 tablet (10 mg total) by mouth every evening. 30 tablet 2     Current Facility-Administered Medications for the 1/30/24 encounter (Office Visit) with Peace Garcia MD   Medication Dose Route Frequency Provider Last Rate Last Admin    estradiol valerate (DELESTROGEN) injection 20 mg/mL  20 mg Intramuscular Q30 Days Gilson El MD   20 mg at 12/30/22 0902     Vital Signs:  /81 (BP Location: Left arm, Patient Position: Sitting)   Pulse 73   Temp 97.7 °F (36.5 °C)   Ht 5' 1" (1.549 m)   Wt 50.5 kg (111 lb 5.3 oz)   LMP 03/30/2015   SpO2 99%   BMI 21.04 kg/m²      Physical Exam  Constitutional:       General: She is not in acute distress.  Cardiovascular:      Rate and Rhythm: Normal rate and regular rhythm.      Pulses: Normal pulses.      Heart sounds: Normal heart sounds. No murmur heard.  Pulmonary:      Effort: Pulmonary effort is normal.      Breath sounds: Normal breath sounds.   Abdominal:      General: Bowel sounds are normal. There is no distension.      Palpations: Abdomen is soft.      Tenderness: There is no abdominal tenderness.     Labs:   Labs:  Lab Results   Component Value Date    CRP 3.3 01/21/2024    CALPROTECTIN 117.6 (H) 01/24/2024     Lab Results   Component Value Date    HEPBSAG Non-reactive 09/19/2023    HEPBCAB Non-reactive 01/02/2024     Lab Results   Component Value Date    TBGOLDPLUS Negative 01/02/2024     Lab Results "   Component Value Date    XYWGJTBW02HK 21 (L) 06/13/2019    LHKAMNUK88 278 01/02/2024     Lab Results   Component Value Date    WBC 14.03 (H) 01/27/2024    HGB 9.8 (L) 01/27/2024    HCT 30.3 (L) 01/27/2024    MCV 87 01/27/2024     01/27/2024     Lab Results   Component Value Date    CREATININE 0.7 02/06/2024    ALBUMIN 2.9 (L) 01/27/2024    BILITOT 0.2 01/27/2024    ALKPHOS 82 01/27/2024    AST 11 01/27/2024    ALT 12 01/27/2024     Assessment/Plan:  Ivy Salgado is a 43 y.o.  with Crohn's disease with end ileostomy and recurrent ileitis, recurrent C diff (2014 x 2), ARPITA/depression, arthritis, h/o abnormal pap smear- LYLA I, nephrolithiasis-nonobstructive, GERD, long QTc syndrome (Type III, on nadolol), s/p SHELLIE (2015) for recurrent ovarian cysts and endometriosis, early melanoma in situ (buttocks and para-stomal site, excised in 2018 and 2019 respectively), history of genital HSV, imuran induced pancreatitis, pancreatic tail cyst, recurrent UTI (h/o ESBL 2018), multiple thyroid nodules, osteopenia, history bacterial vaginosis, h/o abnormal LFTs (elevated ALP since 2016), family history of colon cancer.      Patient has two hospitalizations for high ileostomy output with dehydration since 12/31/23 and ileostomy output has improved. We discussed importance of oral hydration and amount she needs to replace along with plans for IVFs weekly for next 3 weeks and then with 3rd induction infusion of entyvio. Pt will be starting entyvio today with plans for prednisone to be tapered over next few weeks.     # High ileostomy output:  - ileostomy output out of proportion to mild recurrent ileitis   - improved and since discharge 1.4 liters/day  - continue liquid imodium 2 mg QID  - other antidiarrheal options- palpitations with higher dose lomotil but willing to try 0.5 mg and slowly titrate if she can tolerate, pt has h/o zofran induced QT prolongation so unable to proceed with lotronex, octreotide is option and will  avoid tincture of opium  - shortened bowel syndrome diet  - continue PPI BID  - measure your output 3 times/week and email me at the end of each week with update  - try lomotil 0.5 mg daily for 3 days and if tolerated increase every few days to a goal of 0.5 mg po TID and if tolerated then you can increase every few weekdays  - scheduled pt for weekly IVFs 1 liter for next 3 weeks and then will do with week 6 entyvio but will assess frequency based on labs and how pt is feeling    # Crohn's disease with end ileostomy and recurrent ileitis:  - note melanoma  - note genital HSV- on valtrex prophylaxis  - note h/o endometriosis  - repeat stool calprotectin- will consider in future if needed but not sure if correlative with inflammation given stoma granulation tissue   - prednisone 40 mg/d (30 mg/d tomorrow- 1/31, 20 mg/d 2/5, 10 mg/d 2/10, stop 2/15)  - start entyvio- #1 dose today, 1/30/24  - drug monitoring labs: CBC/CMP q 6 mos (7/2024), TPMT (normal metabolizer 1/2024), TB quantiferon (neg 1/2024), Hep B testing (HBsAg, HBtotalcoreAb neg 1/2024)    # Elevated liver tests (AST/ALT, ALP)  - AST/ALT now resolved with serologic w/u 9/2023 acute hep panel neg, 11/2023 HBsAg, HBcAb/SAURAV/AMA/ASMA neg, iron studies neg; 1/2024 normal ceruloplasmin A1AT normal  - due to elevated ALP (since 2019) MRCP done with no findings of PSC though pt has family hx PSC    # Pancreatic tail cyst (CT 12/2023)  - repeat CT in 12/2024  - will consider referral to pancreas cyst clinic     # 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 Simon- last seen 5/2023  - next skin exam due 5/2024  - wear sunblock, hats, sunprotective clothing    # Bone health:  - left hip avascular necrosis on past CT  - osteopenia- has kidney stones so calcium supplements would defer or consult with renal/urology  - vit D deficiency- recommend 5000 IU 2 tabs daily    # Nephrolithiasis  - likely  from dehydration- will prioritize    # Immunodeficiency due to long term immunosuppressive drug therapy and IBD specific health maintenance:  CRC risk- no risk, TAC  Pap smear- SHELLIE with BSO  Vaccines- no live vaccines, pneumonia 20 today     I spent a total of 45 minutes on the day of the visit.This includes face to face time and on-face to face time preparing to see the patient (eg, review of tests, notes), obtaining and/or reviewing separately obtained history, documenting clinical information in the electronic or other health record, independently interpreting results and communicating results to the patient/family/caregiver, and coordinating care.     Follow up in about 6 weeks (around 3/12/2024) for in person.    Peace Garcia MD  Department of Gastroenterology  Medical Director, Inflammatory Bowel Disease              Answers submitted by the patient for this visit:  Established Patient Questionnaire  (Submitted on 1/30/2024)

## 2024-01-31 ENCOUNTER — PATIENT MESSAGE (OUTPATIENT)
Dept: GASTROENTEROLOGY | Facility: CLINIC | Age: 44
End: 2024-01-31
Payer: COMMERCIAL

## 2024-01-31 LAB
FINAL PATHOLOGIC DIAGNOSIS: NORMAL
GROSS: NORMAL
Lab: NORMAL

## 2024-01-31 NOTE — PROGRESS NOTES
[facility]   Physical Therapy Treatment Note     Name: Ivy Salgado  Clinic Number: [id number]    Therapy Diagnosis:   Encounter Diagnosis   Name Primary?    Decreased range of motion of left shoulder Yes       Physician: Mono Giles III, *    Visit Date: 1/30/2024     Evaluation Date: 8/8/2023  Authorization Period Expiration: 7/6/2024  Plan of Care Expiration: 12/31/2023  Progress Note Due: 9/10/2023  Visit # / Visits authorized: 35/ 40  Foto: 2/3      Time In: 16:25  Time Out: 1745  Total Billable Time: 80 minutes     DOS: 7/18/2023  S/p: left  1. Total shoulder arthroplasty (complex, -22 modifier)  2. Shoulder lysis of adhesions  3. Shoulder subpectoral biceps tenodesis (CPT 31022)  Post-Op Precautions: We will follow the shoulder arthroplasty guidelines. The patient remained in a sling for 6 weeks. We will start PT at the 3-week martina.       Precautions: none    SUBJECTIVE     Pt reports: still not feeling well. Had scope on intestines and have an infection. Something is wrong with my liver as well. Have neglected my shoulder    She was compliant with home exercise program.  Response to previous treatment: improvement in pain at rest   Functional change: able to ttol reaching overhead a bit better    Pain: 3/10  Location: left shoulder      OBJECTIVE       Shoulder Active Range of Motion:   Shoulder Right Left   Flexion   150 130   ER at 0   40 20      Shoulder Passive Range of Motion:   Shoulder Right Left   Flexion   160 140   ER at 0   60 40       Strength:   Right Left   Scaption 5/5 3+/5   Shoulder ER at side 5/5 3+/5   Shoulder IR at side  5/5 3+/5            Treatment     Ivy received the treatments listed below:      Therapeutic exercises to develop ROM for 00 minutes including:      Manual therapy techniques: Joint mobilizations and Soft tissue Mobilization were applied to the: L shoulder for 20 minutes, including:  Subscap pinning and flexion   Mets for scap  activation   METs for post deltoid   Passive ROM of shoulder to improve tolerance to movement       Neuromuscular re-education activities to improve: motor control for 15 min    Supine on 1/2 foam: 3# dowel flexion 4x8 with 3 sec hold   Seated ER with ytb 3x12     Therapeutic activities to improve functional performance for 15  minutes, including:  Triceps ext 3x15 otb   Bicep curls 2# 3x12   Education     Patient Education and Home Exercises     Home Exercises Provided and Patient Education Provided     Education provided:   - Add new exercises     Written Home Exercises Provided: yes. Exercises were reviewed and Ivy was able to demonstrate them prior to the end of the session.  Ivy demonstrated good  understanding of the education provided. See EMR under Patient Instructions for exercises provided during therapy sessions    ASSESSMENT     The patient returns to PT following a 4 week leave due to being hospitalized from complications from chrones. During her hospitalization she tried to complete as much exercises as she could and she was able maintain a majority of her ORM, strength has declined which is to be expected due to her hospitalization. Ivy was able to complete all exercises that focused more on scap and deltoid muscle strengthening.     Ivy Is progressing well towards her goals.     Pt prognosis is Fair.     Pt will continue to benefit from skilled outpatient physical therapy to address the deficits listed in the problem list box on initial evaluation, provide pt/family education and to maximize pt's level of independence in the home and community environment.     Pt's spiritual, cultural and educational needs considered and pt agreeable to plan of care and goals.     Anticipated barriers to physical therapy: none     Goals:   Short Term Goals: 12 weeks  1. Pt will be compliant with HEP 50% of prescribed amount. met  2. The pt to demo improvement in R shoulder PROM to by 80% of the L met  3.   The pt to demo tolerance to being out of the sling for 24 hours with pain <2/10 met     Long Term Goals:  36 weeks   Pt will be compliant with % of prescribed amount.  met  The pt to improvement in AROM of L shoulder within 80% of R shoulder to improve tolerance to OH movement  met  The pt to  Demo at least 4+/5 of RTC muscle testing to demo improvement in tolerance to activity progressing, not met  The pt to tolerate lifting 50# from the ground to waist height per job requirement description progressing, not met  The pt will report full participation in ADLs and IADLs without restrictions related to L Shoulder.  progressing, not met    PLAN     Follow TSA procedure     Dorothy Basurto, PT,

## 2024-02-01 ENCOUNTER — CLINICAL SUPPORT (OUTPATIENT)
Dept: REHABILITATION | Facility: HOSPITAL | Age: 44
End: 2024-02-01
Payer: COMMERCIAL

## 2024-02-01 DIAGNOSIS — M25.612 DECREASED RANGE OF MOTION OF LEFT SHOULDER: Primary | ICD-10-CM

## 2024-02-01 PROCEDURE — 97140 MANUAL THERAPY 1/> REGIONS: CPT

## 2024-02-01 PROCEDURE — 97112 NEUROMUSCULAR REEDUCATION: CPT

## 2024-02-01 PROCEDURE — 97530 THERAPEUTIC ACTIVITIES: CPT

## 2024-02-01 RX ORDER — NADOLOL 40 MG/1
40 TABLET ORAL DAILY
Qty: 30 TABLET | Refills: 0 | OUTPATIENT
Start: 2024-02-01 | End: 2025-01-31

## 2024-02-01 NOTE — PLAN OF CARE
[facility]   Physical Therapy Treatment Note     Name: Ivy Salgado  Clinic Number: [id number]    Therapy Diagnosis:   Encounter Diagnosis   Name Primary?    Decreased range of motion of left shoulder Yes       Physician: Mono Giles III, *    Visit Date: 1/30/2024     Evaluation Date: 8/8/2023  Authorization Period Expiration: 7/6/2024  Plan of Care Expiration: 12/31/2023  Progress Note Due: 9/10/2023  Visit # / Visits authorized: 35/ 40  Foto: 2/3      Time In: 16:25  Time Out: 1745  Total Billable Time: 80 minutes     DOS: 7/18/2023  S/p: left  1. Total shoulder arthroplasty (complex, -22 modifier)  2. Shoulder lysis of adhesions  3. Shoulder subpectoral biceps tenodesis (CPT 14952)  Post-Op Precautions: We will follow the shoulder arthroplasty guidelines. The patient remained in a sling for 6 weeks. We will start PT at the 3-week martina.       Precautions: none    SUBJECTIVE     Pt reports: still not feeling well. Had scope on intestines and have an infection. Something is wrong with my liver as well. Have neglected my shoulder    She was compliant with home exercise program.  Response to previous treatment: improvement in pain at rest   Functional change: able to ttol reaching overhead a bit better    Pain: 3/10  Location: left shoulder      OBJECTIVE       Shoulder Active Range of Motion:   Shoulder Right Left   Flexion   150 130   ER at 0   40 20      Shoulder Passive Range of Motion:   Shoulder Right Left   Flexion   160 140   ER at 0   60 40       Strength:   Right Left   Scaption 5/5 3+/5   Shoulder ER at side 5/5 3+/5   Shoulder IR at side  5/5 3+/5            Treatment     Ivy received the treatments listed below:      Therapeutic exercises to develop ROM for 00 minutes including:      Manual therapy techniques: Joint mobilizations and Soft tissue Mobilization were applied to the: L shoulder for 20 minutes, including:  Subscap pinning and flexion   Mets for scap  activation   METs for post deltoid   Passive ROM of shoulder to improve tolerance to movement       Neuromuscular re-education activities to improve: motor control for 15 min    Supine on 1/2 foam: 3# dowel flexion 4x8 with 3 sec hold   Seated ER with ytb 3x12     Therapeutic activities to improve functional performance for 15  minutes, including:  Triceps ext 3x15 otb   Bicep curls 2# 3x12   Education     Patient Education and Home Exercises     Home Exercises Provided and Patient Education Provided     Education provided:   - Add new exercises     Written Home Exercises Provided: yes. Exercises were reviewed and Ivy was able to demonstrate them prior to the end of the session.  Ivy demonstrated good  understanding of the education provided. See EMR under Patient Instructions for exercises provided during therapy sessions    ASSESSMENT     The patient returns to PT following a 4 week leave due to being hospitalized from complications from chrones. During her hospitalization she tried to complete as much exercises as she could and she was able maintain a majority of her ORM, strength has declined which is to be expected due to her hospitalization. Ivy was able to complete all exercises that focused more on scap and deltoid muscle strengthening.     Ivy Is progressing well towards her goals.     Pt prognosis is Fair.     Pt will continue to benefit from skilled outpatient physical therapy to address the deficits listed in the problem list box on initial evaluation, provide pt/family education and to maximize pt's level of independence in the home and community environment.     Pt's spiritual, cultural and educational needs considered and pt agreeable to plan of care and goals.     Anticipated barriers to physical therapy: none     Goals:   Short Term Goals: 12 weeks  1. Pt will be compliant with HEP 50% of prescribed amount. met  2. The pt to demo improvement in R shoulder PROM to by 80% of the L met  3.   The pt to demo tolerance to being out of the sling for 24 hours with pain <2/10 met     Long Term Goals:  36 weeks   Pt will be compliant with % of prescribed amount.  met  The pt to improvement in AROM of L shoulder within 80% of R shoulder to improve tolerance to OH movement  met  The pt to  Demo at least 4+/5 of RTC muscle testing to demo improvement in tolerance to activity progressing, not met  The pt to tolerate lifting 50# from the ground to waist height per job requirement description progressing, not met  The pt will report full participation in ADLs and IADLs without restrictions related to L Shoulder.  progressing, not met    PLAN     Follow TSA procedure     Dorothy Basurto, PT,

## 2024-02-02 ENCOUNTER — OFFICE VISIT (OUTPATIENT)
Dept: INTERNAL MEDICINE | Facility: CLINIC | Age: 44
End: 2024-02-02
Payer: COMMERCIAL

## 2024-02-02 ENCOUNTER — PATIENT MESSAGE (OUTPATIENT)
Dept: GASTROENTEROLOGY | Facility: CLINIC | Age: 44
End: 2024-02-02
Payer: COMMERCIAL

## 2024-02-02 VITALS
OXYGEN SATURATION: 98 % | BODY MASS INDEX: 22.89 KG/M2 | DIASTOLIC BLOOD PRESSURE: 70 MMHG | WEIGHT: 121.25 LBS | HEART RATE: 73 BPM | HEIGHT: 61 IN | SYSTOLIC BLOOD PRESSURE: 102 MMHG

## 2024-02-02 DIAGNOSIS — R19.8 HIGH OUTPUT ILEOSTOMY: Primary | ICD-10-CM

## 2024-02-02 DIAGNOSIS — Z93.2 HIGH OUTPUT ILEOSTOMY: Primary | ICD-10-CM

## 2024-02-02 DIAGNOSIS — M87.019 AVASCULAR NECROSIS OF BONE OF SHOULDER: ICD-10-CM

## 2024-02-02 DIAGNOSIS — F33.1 MODERATE EPISODE OF RECURRENT MAJOR DEPRESSIVE DISORDER: ICD-10-CM

## 2024-02-02 PROCEDURE — 3074F SYST BP LT 130 MM HG: CPT | Mod: CPTII,S$GLB,, | Performed by: FAMILY MEDICINE

## 2024-02-02 PROCEDURE — 1111F DSCHRG MED/CURRENT MED MERGE: CPT | Mod: CPTII,S$GLB,, | Performed by: FAMILY MEDICINE

## 2024-02-02 PROCEDURE — 3078F DIAST BP <80 MM HG: CPT | Mod: CPTII,S$GLB,, | Performed by: FAMILY MEDICINE

## 2024-02-02 PROCEDURE — 99999 PR PBB SHADOW E&M-EST. PATIENT-LVL IV: CPT | Mod: PBBFAC,,, | Performed by: FAMILY MEDICINE

## 2024-02-02 PROCEDURE — 1159F MED LIST DOCD IN RCRD: CPT | Mod: CPTII,S$GLB,, | Performed by: FAMILY MEDICINE

## 2024-02-02 PROCEDURE — 3044F HG A1C LEVEL LT 7.0%: CPT | Mod: CPTII,S$GLB,, | Performed by: FAMILY MEDICINE

## 2024-02-02 PROCEDURE — 99214 OFFICE O/P EST MOD 30 MIN: CPT | Mod: S$GLB,,, | Performed by: FAMILY MEDICINE

## 2024-02-02 RX ORDER — NADOLOL 40 MG/1
40 TABLET ORAL DAILY
Qty: 30 TABLET | Refills: 0 | OUTPATIENT
Start: 2024-02-02 | End: 2025-02-01

## 2024-02-02 NOTE — PROGRESS NOTES
Subjective:       Patient ID: Ivy Salgado is a 43 y.o. female.    Chief Complaint: Follow-up (hospital)    HPI  Recently discharged after 11 day stay. She has already seen GI since discharge on 1/30. Still on pred taper and on lomotil. Will also be getting 1L IVF weekly.  Excerpt from d/c summary:  Hospital Course:   Patient was admitted as a direct admit from GI clinic due to high ileostomy output. She was treated with continuous IVF, anti-diarrheals, and pain medications as needed. GI/IBD were consulted, recommended continued supportive treatment and IV Solumedrol. Initially there were no plans for repeat ileoscopy as she recently completed one during last admission, biopsy from which showed active enteritis with ulceration, however patient with continued pain and high output. Ileoscopy completed on 01/24 and was significant only for a solitary ulcer 19 cm proximal to the stoma. CDiff studies negative, E. Coli Ag negative, and Stool culture with no growth. Steroids were transitioned to Prednisone 40 mg daily and then to be tapered down. On 01/27, patient was medically cleared for discharge with better appetite, improved pain and improved ileostomy output.     Transitional Care Note    Family and/or Caretaker present at visit?  Yes.  Diagnostic tests reviewed/disposition: No diagnosic tests pending after this hospitalization.  Disease/illness education: discussed  Home health/community services discussion/referrals: Patient does not have home health established from hospital visit.  They do not need home health.  If needed, we will set up home health for the patient.   Establishment or re-establishment of referral orders for community resources: No other necessary community resources.   Discussion with other health care providers: No discussion with other health care providers necessary.           All of your core healthy metrics are met.      Social History     Social History Narrative    Not on file        Family History   Problem Relation Age of Onset    Diabetes Paternal Grandfather     Hearing loss Paternal Grandmother     Cancer Maternal Grandfather         Skin    Skin cancer Maternal Grandfather     Diabetes Maternal Grandfather     Heart disease Maternal Grandfather     Colon cancer Father     Cancer Father         Colon    Liver cancer Father     Hyperlipidemia Father     Hypertension Mother     Crohn's disease Brother     Endometrial cancer Maternal Aunt     Breast cancer Maternal Cousin 41    Crohn's disease Daughter     Ovarian cancer Neg Hx     Melanoma Neg Hx        Current Outpatient Medications:     clonazePAM (KLONOPIN) 1 MG tablet, Take 1 tablet (1 mg total) by mouth 2 (two) times daily., Disp: 60 tablet, Rfl: 2    cyclobenzaprine (FLEXERIL) 10 MG tablet, Take 1 tablet (10 mg total) by mouth every evening as needed for muscle pain, Disp: 15 tablet, Rfl: 0    diphenoxylate-atropine 2.5-0.025 mg/5 ml (LOMOTIL) 2.5-0.025 mg/5 mL liquid, Take 2.5-5 ml up to 4 times a day - dose might change at visit on 1/30 (Patient taking differently: 4 (four) times daily as needed. Take 2.5-5 ml up to 4 times a day - dose might change at visit on 1/30), Disp: 300 mL, Rfl: 0    estradiol 0.025 mg/24 hr 0.025 mg/24 hr, Place 1 patch onto the skin once a week., Disp: 4 patch, Rfl: 11    fluconazole (DIFLUCAN) 150 MG Tab, Take 1 tablet (150 mg total) by mouth every 72 hours as needed (vaginal yeast)., Disp: 3 tablet, Rfl: 0    gabapentin (NEURONTIN) 300 MG capsule, Take 1 capsule (300 mg total) by mouth 2 (two) times daily., Disp: 60 capsule, Rfl: 11    loperamide (IMODIUM) 1 mg/7.5 mL solution, Take 30 mLs (4 mg total) by mouth 4 (four) times daily., Disp: 3600 mL, Rfl: 0    mirtazapine (REMERON SOL-TAB) 30 MG disintegrating tablet, Take 1 tablet (30 mg total) by mouth nightly., Disp: 30 tablet, Rfl: 0    multivitamin (THERAGRAN) per tablet, Take 1 tablet by mouth once daily., Disp: , Rfl:     nadoloL (CORGARD) 40 MG  tablet, Take 1 tablet (40 mg total) by mouth once daily. Patient needs appointment before next refill., Disp: 30 tablet, Rfl: 0    oxyCODONE-acetaminophen (PERCOCET)  mg per tablet, Take 1 tablet by mouth every 8 (eight) hours as needed for Pain., Disp: 21 tablet, Rfl: 0    pantoprazole (PROTONIX) 40 MG tablet, Take 1 tablet (40 mg total) by mouth 2 (two) times daily., Disp: 60 tablet, Rfl: 12    predniSONE (DELTASONE) 10 MG tablet, Take 4 tablets (40 mg total) by mouth once daily for 3 days, THEN 3 tablets (30 mg total) once daily for 5 days, THEN 2 tablets (20 mg total) once daily for 5 days, THEN 1 tablet (10 mg total) once daily for 5 days., Disp: 42 tablet, Rfl: 0    promethazine (PHENERGAN) 25 MG tablet, Take 1 tablet (25 mg total) by mouth every 6 (six) hours as needed for Nausea., Disp: 40 tablet, Rfl: 0    triamcinolone acetonide 0.1% (KENALOG) 0.1 % cream, Apply topically 2 (two) times daily., Disp: , Rfl:     valACYclovir (VALTREX) 500 MG tablet, Take 1 tablet (500 mg total) by mouth once daily., Disp: 90 tablet, Rfl: 3    zolpidem (AMBIEN) 10 mg Tab, Take 1 tablet (10 mg total) by mouth every evening., Disp: 30 tablet, Rfl: 2    droNABinol (MARINOL) 5 MG capsule, Take 1 capsule (5 mg total) by mouth 2 (two) times daily before meals. (Patient not taking: Reported on 2/2/2024), Disp: 60 capsule, Rfl: 1    naloxone (NARCAN) 4 mg/actuation Spry, 1 spray (4 mg total) by Nasal route once. for 1 dose as needed as directed on package, Disp: 2 each, Rfl: 0    tamsulosin (FLOMAX) 0.4 mg Cap, Take 1 capsule (0.4 mg total) by mouth once daily., Disp: 30 capsule, Rfl: 1    vedolizumab (ENTYVIO) 300 mg SolR injection, Inject 300 mg into the vein., Disp: , Rfl:     Current Facility-Administered Medications:     estradiol valerate (DELESTROGEN) injection 20 mg/mL, 20 mg, Intramuscular, Q30 Days, Gilson El MD, 20 mg at 12/30/22 0902    Review of Systems   Constitutional:  Negative for chills and fever.  "  Respiratory:  Negative for cough and shortness of breath.    Cardiovascular:  Negative for chest pain.   Gastrointestinal:  Negative for abdominal pain.   Skin:  Negative for rash.   Neurological:  Negative for dizziness.       Objective:   /70 (BP Location: Left arm, Patient Position: Sitting)   Pulse 73   Ht 5' 1" (1.549 m)   Wt 55 kg (121 lb 4.1 oz)   LMP 03/30/2015   SpO2 98%   BMI 22.91 kg/m²      Physical Exam  Vitals reviewed.   Constitutional:       Appearance: She is well-developed.   HENT:      Head: Normocephalic and atraumatic.   Eyes:      Conjunctiva/sclera: Conjunctivae normal.   Cardiovascular:      Rate and Rhythm: Normal rate.   Pulmonary:      Effort: Pulmonary effort is normal. No respiratory distress.   Skin:     General: Skin is warm and dry.      Findings: No rash.   Neurological:      Mental Status: She is alert and oriented to person, place, and time.      Coordination: Coordination normal.   Psychiatric:         Behavior: Behavior normal.         Assessment & Plan     Problem List Items Addressed This Visit          Psychiatric    Moderate episode of recurrent major depressive disorder    Current Assessment & Plan     Complicated by medical conditions.  Currently stable            GI    High output ileostomy - Primary    Current Assessment & Plan     Still having high output but it is better than she was.  Continues on prednisone taper and just had her 1st dose of Entyvio this week and will have another next week.            Orthopedic    Avascular necrosis of bone of shoulder    Current Assessment & Plan     Secondary to chronic steroid use.              Immunizations Administered on Date of Encounter - 2/2/2024       No immunizations on file.             No follow-ups on file.      Disclaimer:  This note may have been prepared using voice recognition software, it may have not been extensively proofed, as such there could be errors within the text such as sound alike " errors.

## 2024-02-02 NOTE — ASSESSMENT & PLAN NOTE
Still having high output but it is better than she was.  Continues on prednisone taper and just had her 1st dose of Entyvio this week and will have another next week.

## 2024-02-02 NOTE — LETTER
February 2, 2024    Ivy Salgado  [address] [place] [id number]         [facility]  [address] [facility] [address]  Phone: [phone]  Fax: [phone] February 2, 2024     Patient: Ivy Salgado   YOB: 1980   Date of Visit: 2/2/2024       To Whom It May Concern:    It is my medical opinion that Ivy Salgado may return to light duty (previous restrictions) in 2 weeks on 2/17.    If you have any questions or concerns, please don't hesitate to call.    Sincerely,        Luis Madden, DO

## 2024-02-03 NOTE — PROGRESS NOTES
[facility]   Physical Therapy Treatment Note     Name: Ivy Salgado  Clinic Number: [id number]    Therapy Diagnosis:   Encounter Diagnosis   Name Primary?    Decreased range of motion of left shoulder Yes       Physician: Mono Giles III, *    Visit Date: 2/1/2024     Evaluation Date: 8/8/2023  Authorization Period Expiration: 7/6/2024  Plan of Care Expiration: 12/31/2023  Progress Note Due: 9/10/2023  Visit # / Visits authorized: 36/ 40  Foto: 2/3     Time In: 1505  Time Out: 02638  Total Billable Time: 30 minutes     DOS: 7/18/2023  S/p: left  1. Total shoulder arthroplasty (complex, -22 modifier)  2. Shoulder lysis of adhesions  3. Shoulder subpectoral biceps tenodesis (CPT 92245)  Post-Op Precautions: We will follow the shoulder arthroplasty guidelines. The patient remained in a sling for 6 weeks. We will start PT at the 3-week martina.       Precautions: none    SUBJECTIVE     Pt reports: she is still not feeling well but her shoulder is OK     She was compliant with home exercise program.  Response to previous treatment: improvement in pain at rest   Functional change: able to ttol reaching overhead a bit better    Pain: 3/10  Location: left shoulder      OBJECTIVE       Shoulder Active Range of Motion:   Shoulder Right Left   Flexion   150 130   ER at 0   40 20      Shoulder Passive Range of Motion:   Shoulder Right Left   Flexion   160 140   ER at 0   60 40       Strength:   Right Left   Scaption 5/5 3+/5   Shoulder ER at side 5/5 3+/5   Shoulder IR at side  5/5 3+/5            Treatment     Ivy received the treatments listed below:      Therapeutic exercises to develop ROM for 00 minutes including:      Manual therapy techniques: Joint mobilizations and Soft tissue Mobilization were applied to the: L shoulder for 10 minutes, including:  Subscap pinning and flexion   Mets for scap activation   METs for post deltoid   Passive ROM of shoulder to improve tolerance to movement        Neuromuscular re-education activities to improve: motor control for 18 min    Supine on 1/2 foam: 3# dowel flexion 4x8 with 3 sec hold   Seated ER with ytb 3x12  Seated Lateral raises 3x12      Therapeutic activities to improve functional performance for 15  minutes, including:  Triceps ext 3x15 otb   Bicep curls 2# 3x12   Education     Patient Education and Home Exercises     Home Exercises Provided and Patient Education Provided     Education provided:   - Add new exercises     Written Home Exercises Provided: yes. Exercises were reviewed and Ivy was able to demonstrate them prior to the end of the session.  Ivy demonstrated good  understanding of the education provided. See EMR under Patient Instructions for exercises provided during therapy sessions    ASSESSMENT     The patient was able to complete all exercises, tolerating strengthening of shoulder well. Plateuing with ROM gains but her ROM is extremely functional and so this might be the most ROM she will achieve. Will cont to address as needed    Ivy Is progressing well towards her goals.     Pt prognosis is Fair.     Pt will continue to benefit from skilled outpatient physical therapy to address the deficits listed in the problem list box on initial evaluation, provide pt/family education and to maximize pt's level of independence in the home and community environment.     Pt's spiritual, cultural and educational needs considered and pt agreeable to plan of care and goals.     Anticipated barriers to physical therapy: none     Goals:   Short Term Goals: 12 weeks  1. Pt will be compliant with HEP 50% of prescribed amount. met  2. The pt to demo improvement in R shoulder PROM to by 80% of the L met  3.  The pt to demo tolerance to being out of the sling for 24 hours with pain <2/10 met     Long Term Goals:  36 weeks   Pt will be compliant with % of prescribed amount.  met  The pt to improvement in AROM of L shoulder within 80% of R  shoulder to improve tolerance to OH movement  met  The pt to  Demo at least 4+/5 of RTC muscle testing to demo improvement in tolerance to activity progressing, not met  The pt to tolerate lifting 50# from the ground to waist height per job requirement description progressing, not met  The pt will report full participation in ADLs and IADLs without restrictions related to L Shoulder.  progressing, not met    PLAN     Follow TSA procedure     Dorothy Basurto, PT,

## 2024-02-04 ENCOUNTER — PATIENT MESSAGE (OUTPATIENT)
Dept: REHABILITATION | Facility: HOSPITAL | Age: 44
End: 2024-02-04
Payer: COMMERCIAL

## 2024-02-05 ENCOUNTER — PATIENT MESSAGE (OUTPATIENT)
Dept: GASTROENTEROLOGY | Facility: CLINIC | Age: 44
End: 2024-02-05
Payer: COMMERCIAL

## 2024-02-06 ENCOUNTER — CLINICAL SUPPORT (OUTPATIENT)
Dept: REHABILITATION | Facility: HOSPITAL | Age: 44
End: 2024-02-06
Payer: COMMERCIAL

## 2024-02-06 DIAGNOSIS — M25.612 DECREASED RANGE OF MOTION OF LEFT SHOULDER: Primary | ICD-10-CM

## 2024-02-06 PROCEDURE — 97140 MANUAL THERAPY 1/> REGIONS: CPT

## 2024-02-06 PROCEDURE — 97112 NEUROMUSCULAR REEDUCATION: CPT

## 2024-02-06 PROCEDURE — 97530 THERAPEUTIC ACTIVITIES: CPT

## 2024-02-07 ENCOUNTER — PATIENT MESSAGE (OUTPATIENT)
Dept: GASTROENTEROLOGY | Facility: CLINIC | Age: 44
End: 2024-02-07
Payer: COMMERCIAL

## 2024-02-07 DIAGNOSIS — Z79.899 IMMUNODEFICIENCY DUE TO LONG TERM IMMUNOSUPPRESSIVE DRUG THERAPY: ICD-10-CM

## 2024-02-07 DIAGNOSIS — R19.8 HIGH OUTPUT ILEOSTOMY: Primary | ICD-10-CM

## 2024-02-07 DIAGNOSIS — T45.1X5A IMMUNODEFICIENCY DUE TO LONG TERM IMMUNOSUPPRESSIVE DRUG THERAPY: ICD-10-CM

## 2024-02-07 DIAGNOSIS — D84.821 IMMUNODEFICIENCY DUE TO LONG TERM IMMUNOSUPPRESSIVE DRUG THERAPY: ICD-10-CM

## 2024-02-07 DIAGNOSIS — Z93.2 HIGH OUTPUT ILEOSTOMY: Primary | ICD-10-CM

## 2024-02-07 DIAGNOSIS — K50.00 CROHN'S DISEASE OF SMALL INTESTINE WITHOUT COMPLICATION: ICD-10-CM

## 2024-02-07 DIAGNOSIS — R19.7 DIARRHEA, UNSPECIFIED TYPE: ICD-10-CM

## 2024-02-07 LAB — SOMATOSTAT PLAS-MCNC: <21 PG/ML

## 2024-02-07 RX ORDER — NADOLOL 40 MG/1
40 TABLET ORAL DAILY
Qty: 30 TABLET | Refills: 0 | OUTPATIENT
Start: 2024-02-07 | End: 2025-02-06

## 2024-02-09 RX ORDER — NADOLOL 40 MG/1
40 TABLET ORAL DAILY
Qty: 30 TABLET | Refills: 0 | OUTPATIENT
Start: 2024-02-09 | End: 2025-02-08

## 2024-02-09 NOTE — PROGRESS NOTES
[facility]   Physical Therapy Treatment Note     Name: Ivy Salgado  Clinic Number: [id number]    Therapy Diagnosis:   Encounter Diagnosis   Name Primary?    Decreased range of motion of left shoulder Yes       Physician: Mono Giles III, *    Visit Date: 2/6/2024     Evaluation Date: 8/8/2023  Authorization Period Expiration: 7/6/2024  Plan of Care Expiration: 12/31/2023  Progress Note Due: 9/10/2023  Visit # / Visits authorized: 37/ 40  Foto: 2/3     Time In: 0900  Time Out: 1000  Total Billable Time: 50 minutes     DOS: 7/18/2023  S/p: left  1. Total shoulder arthroplasty (complex, -22 modifier)  2. Shoulder lysis of adhesions  3. Shoulder subpectoral biceps tenodesis (CPT 13094)  Post-Op Precautions: We will follow the shoulder arthroplasty guidelines. The patient remained in a sling for 6 weeks. We will start PT at the 3-week martina.       Precautions: none    SUBJECTIVE     Pt reports: she had a rough night but wants to continue to do PT to make sure her shoulder does not get too weak or tight    She was compliant with home exercise program.  Response to previous treatment: improvement in pain at rest   Functional change: able to ttol reaching overhead a bit better    Pain: 3/10  Location: left shoulder      OBJECTIVE       Shoulder Active Range of Motion:   Shoulder Right Left   Flexion   150 130   ER at 0   40 20      Shoulder Passive Range of Motion:   Shoulder Right Left   Flexion   160 140   ER at 0   60 40       Strength:   Right Left   Scaption 5/5 3+/5   Shoulder ER at side 5/5 3+/5   Shoulder IR at side  5/5 3+/5            Treatment     Ivy received the treatments listed below:        Manual therapy techniques: Joint mobilizations and Soft tissue Mobilization were applied to the: L shoulder for 10 minutes, including:  Subscap pinning and flexion   Mets for scap activation   METs for post deltoid   Passive ROM of shoulder to improve tolerance to movement      Neuromuscular re-education activities to improve: motor control for 18 min    Supine on 1/2 foam: 3# dowel flexion 4x8 with 3 sec hold   Seated ER with ytb 3x12  Seated Lateral raises 3x12      Therapeutic activities to improve functional performance for 15  minutes, including:  Triceps ext 3x15 otb   Bicep curls 2# 3x12   Education     Patient Education and Home Exercises     Home Exercises Provided and Patient Education Provided     Education provided:   - Add new exercises     Written Home Exercises Provided: yes. Exercises were reviewed and Ivy was able to demonstrate them prior to the end of the session.  Ivy demonstrated good  understanding of the education provided. See EMR under Patient Instructions for exercises provided during therapy sessions    ASSESSMENT     Due to the patient's recent exacerbation of chron's disease, we have decreased intensity of strengthening but the patient is able to tolerate prescribed exercises with good technique. Cont to focus on strengthening and maintenance of ROM     Ivy Is progressing well towards her goals.     Pt prognosis is Fair.     Pt will continue to benefit from skilled outpatient physical therapy to address the deficits listed in the problem list box on initial evaluation, provide pt/family education and to maximize pt's level of independence in the home and community environment.     Pt's spiritual, cultural and educational needs considered and pt agreeable to plan of care and goals.     Anticipated barriers to physical therapy: none     Goals:   Short Term Goals: 12 weeks  1. Pt will be compliant with HEP 50% of prescribed amount. met  2. The pt to demo improvement in R shoulder PROM to by 80% of the L met  3.  The pt to demo tolerance to being out of the sling for 24 hours with pain <2/10 met     Long Term Goals:  36 weeks   Pt will be compliant with % of prescribed amount.  met  The pt to improvement in AROM of L shoulder within 80% of R  shoulder to improve tolerance to OH movement  met  The pt to  Demo at least 4+/5 of RTC muscle testing to demo improvement in tolerance to activity progressing, not met  The pt to tolerate lifting 50# from the ground to waist height per job requirement description progressing, not met  The pt will report full participation in ADLs and IADLs without restrictions related to L Shoulder.  progressing, not met    PLAN     Follow TSA procedure     Dorothy Basurto, PT,

## 2024-02-10 PROBLEM — A04.71 RECURRENT CLOSTRIDIOIDES DIFFICILE DIARRHEA: Status: ACTIVE | Noted: 2024-02-10

## 2024-02-10 PROBLEM — N12 PYELONEPHRITIS: Status: RESOLVED | Noted: 2022-03-23 | Resolved: 2024-02-10

## 2024-02-10 PROBLEM — M87.052 AVASCULAR NECROSIS OF BONE OF HIP, LEFT: Status: ACTIVE | Noted: 2024-02-10

## 2024-02-10 PROBLEM — C43.9 MELANOMA: Status: ACTIVE | Noted: 2024-02-10

## 2024-02-10 PROBLEM — K85.30: Status: ACTIVE | Noted: 2024-02-10

## 2024-02-10 PROBLEM — R79.89 ELEVATED LIVER FUNCTION TESTS: Status: RESOLVED | Noted: 2024-01-04 | Resolved: 2024-02-10

## 2024-02-10 PROBLEM — R74.01 TRANSAMINITIS: Status: RESOLVED | Noted: 2024-01-03 | Resolved: 2024-02-10

## 2024-02-10 PROBLEM — R31.9 HEMATURIA: Status: RESOLVED | Noted: 2023-09-29 | Resolved: 2024-02-10

## 2024-02-10 PROBLEM — R00.2 PALPITATIONS: Status: RESOLVED | Noted: 2018-08-30 | Resolved: 2024-02-10

## 2024-02-10 PROBLEM — K86.2 PANCREAS CYST: Status: ACTIVE | Noted: 2024-02-10

## 2024-02-10 PROBLEM — N76.0 BACTERIAL VAGINOSIS: Status: ACTIVE | Noted: 2024-02-10

## 2024-02-10 PROBLEM — N20.0 KIDNEY STONE: Status: ACTIVE | Noted: 2024-02-10

## 2024-02-10 PROBLEM — M79.671 RIGHT FOOT PAIN: Status: RESOLVED | Noted: 2023-03-13 | Resolved: 2024-02-10

## 2024-02-10 PROBLEM — R63.0 APPETITE IMPAIRED: Status: RESOLVED | Noted: 2017-11-20 | Resolved: 2024-02-10

## 2024-02-10 PROBLEM — E83.51 HYPOCALCEMIA: Status: RESOLVED | Noted: 2023-10-30 | Resolved: 2024-02-10

## 2024-02-10 PROBLEM — B96.89 BACTERIAL VAGINOSIS: Status: ACTIVE | Noted: 2024-02-10

## 2024-02-10 PROBLEM — I45.81 LONG QT SYNDROME: Status: ACTIVE | Noted: 2024-02-10

## 2024-02-10 NOTE — PROGRESS NOTES
I spent 120 minutes on 1/15/24 reviewing Ivy Nubiamadisyn [place] medical records following to clinic visit on 1/16/24.

## 2024-02-11 ENCOUNTER — PATIENT MESSAGE (OUTPATIENT)
Dept: REHABILITATION | Facility: HOSPITAL | Age: 44
End: 2024-02-11
Payer: COMMERCIAL

## 2024-02-12 ENCOUNTER — CLINICAL SUPPORT (OUTPATIENT)
Dept: REHABILITATION | Facility: HOSPITAL | Age: 44
End: 2024-02-12
Payer: COMMERCIAL

## 2024-02-12 DIAGNOSIS — M25.612 DECREASED RANGE OF MOTION OF LEFT SHOULDER: Primary | ICD-10-CM

## 2024-02-12 PROCEDURE — 97140 MANUAL THERAPY 1/> REGIONS: CPT

## 2024-02-12 PROCEDURE — 97112 NEUROMUSCULAR REEDUCATION: CPT

## 2024-02-12 PROCEDURE — 97530 THERAPEUTIC ACTIVITIES: CPT

## 2024-02-13 RX ORDER — NADOLOL 40 MG/1
40 TABLET ORAL DAILY
Qty: 30 TABLET | Refills: 0 | Status: CANCELLED | OUTPATIENT
Start: 2024-02-13 | End: 2025-02-12

## 2024-02-14 NOTE — TELEPHONE ENCOUNTER
Called Ms. Salgado to inform her that we need to get her appt with Dr. Monsalve scheduled before we send a refill request to Dr. Monsalve. Left message and call back number.

## 2024-02-14 NOTE — PROGRESS NOTES
[facility]   Physical Therapy Treatment Note     Name: Ivy Salgado  Clinic Number: [id number]    Therapy Diagnosis:   Encounter Diagnosis   Name Primary?    Decreased range of motion of left shoulder Yes       Physician: Mono Giles III, *    Visit Date: 2/12/2024     Evaluation Date: 8/8/2023  Authorization Period Expiration: 7/6/2024  Plan of Care Expiration: 12/31/2023  Progress Note Due: 9/10/2023  Visit # / Visits authorized: 38/ 40  Foto: 2/3     Time In: 1420  Time Out: 1520  Total Billable Time: 50 minutes     DOS: 7/18/2023  S/p: left  1. Total shoulder arthroplasty (complex, -22 modifier)  2. Shoulder lysis of adhesions  3. Shoulder subpectoral biceps tenodesis (CPT 36494)  Post-Op Precautions: We will follow the shoulder arthroplasty guidelines. The patient remained in a sling for 6 weeks. We will start PT at the 3-week martina.       Precautions: none    SUBJECTIVE     Pt reports: she tried to lift a jar of spaghetti in the grocery store with her L arm only and she was unable to  and and it dropped to the ground and shattered. She still feels weak in her L arm.     She was compliant with home exercise program.  Response to previous treatment: improvement in pain at rest   Functional change: able to ttol reaching overhead a bit better    Pain: 3/10  Location: left shoulder      OBJECTIVE       Shoulder Active Range of Motion:   Shoulder Right Left   Flexion   150 130   ER at 0   40 20      Shoulder Passive Range of Motion:   Shoulder Right Left   Flexion   160 150   ER at 0   60 40       Strength:   Right Left   Scaption 5/5 3+/5   Shoulder ER at side 5/5 3+/5   Shoulder IR at side  5/5 3+/5            Treatment     Ivy received the treatments listed below:        Manual therapy techniques: Joint mobilizations and Soft tissue Mobilization were applied to the: L shoulder for 10 minutes, including:  Subscap pinning and flexion   Mets for scap activation   METs for  post deltoid   Passive ROM of shoulder to improve tolerance to movement     Neuromuscular re-education activities to improve: motor control for 08 min    Ita 3# 4x10       Therapeutic activities to improve functional performance for 30  minutes, including:  Triceps ext 3x15 otb   Bicep curls 4# 4x5  Face pull ytb 3x10   Lat pull down gtb elbows bent 3x10     Patient Education and Home Exercises     Home Exercises Provided and Patient Education Provided     Education provided:   - Add new exercises     Written Home Exercises Provided: yes. Exercises were reviewed and Ivy was able to demonstrate them prior to the end of the session.  Ivy demonstrated good  understanding of the education provided. See EMR under Patient Instructions for exercises provided during therapy sessions    ASSESSMENT     The patient was able to complete prescribed exercises today with improvement in technique compared to last visit. Progressed to add in shoulder height deltoid exercises with resistance. Will cont to address weakness of shoulder and maintenance of ROM.     Ivy Is progressing well towards her goals.     Pt prognosis is Fair.     Pt will continue to benefit from skilled outpatient physical therapy to address the deficits listed in the problem list box on initial evaluation, provide pt/family education and to maximize pt's level of independence in the home and community environment.     Pt's spiritual, cultural and educational needs considered and pt agreeable to plan of care and goals.     Anticipated barriers to physical therapy: none     Goals:   Short Term Goals: 12 weeks  1. Pt will be compliant with HEP 50% of prescribed amount. met  2. The pt to demo improvement in R shoulder PROM to by 80% of the L met  3.  The pt to demo tolerance to being out of the sling for 24 hours with pain <2/10 met     Long Term Goals:  36 weeks   Pt will be compliant with % of prescribed amount.  met  The pt to improvement in  AROM of L shoulder within 80% of R shoulder to improve tolerance to OH movement  met  The pt to  Demo at least 4+/5 of RTC muscle testing to demo improvement in tolerance to activity progressing, not met  The pt to tolerate lifting 50# from the ground to waist height per job requirement description progressing, not met  The pt will report full participation in ADLs and IADLs without restrictions related to L Shoulder.  progressing, not met    PLAN     Follow TSA procedure     Dorothy Basurto, PT,

## 2024-02-15 DIAGNOSIS — F41.9 ANXIETY: ICD-10-CM

## 2024-02-15 RX ORDER — ZOLPIDEM TARTRATE 10 MG/1
10 TABLET ORAL NIGHTLY
Qty: 30 TABLET | Refills: 2 | Status: SHIPPED | OUTPATIENT
Start: 2024-02-15 | End: 2024-05-28 | Stop reason: SDUPTHER

## 2024-02-15 RX ORDER — ZOLPIDEM TARTRATE 10 MG/1
10 TABLET ORAL NIGHTLY
Qty: 30 TABLET | Refills: 2 | Status: CANCELLED | OUTPATIENT
Start: 2024-02-15

## 2024-02-15 NOTE — TELEPHONE ENCOUNTER
No care due was identified.  [facility] Embedded Care Due Messages. Reference number: [id number].   2/15/2024 6:08:33 AM CST

## 2024-02-15 NOTE — TELEPHONE ENCOUNTER
No care due was identified.  [facility] Embedded Care Due Messages. Reference number: [id number].   2/15/2024 2:04:27 PM CST

## 2024-02-16 DIAGNOSIS — Z87.898 HISTORY OF PROLONGED Q-T INTERVAL ON ECG: Primary | ICD-10-CM

## 2024-02-16 RX ORDER — NADOLOL 40 MG/1
40 TABLET ORAL DAILY
Qty: 30 TABLET | Refills: 2 | Status: CANCELLED | OUTPATIENT
Start: 2024-02-16 | End: 2025-02-15

## 2024-02-17 RX ORDER — NADOLOL 40 MG/1
40 TABLET ORAL DAILY
Qty: 30 TABLET | Refills: 2 | Status: SHIPPED | OUTPATIENT
Start: 2024-02-17 | End: 2024-04-15 | Stop reason: SDUPTHER

## 2024-02-19 ENCOUNTER — CLINICAL SUPPORT (OUTPATIENT)
Dept: REHABILITATION | Facility: HOSPITAL | Age: 44
End: 2024-02-19
Payer: COMMERCIAL

## 2024-02-19 DIAGNOSIS — M25.612 DECREASED RANGE OF MOTION OF LEFT SHOULDER: Primary | ICD-10-CM

## 2024-02-19 PROCEDURE — 97530 THERAPEUTIC ACTIVITIES: CPT

## 2024-02-19 PROCEDURE — 97140 MANUAL THERAPY 1/> REGIONS: CPT

## 2024-02-19 PROCEDURE — 97112 NEUROMUSCULAR REEDUCATION: CPT

## 2024-02-20 NOTE — PROGRESS NOTES
[facility]   Physical Therapy Treatment Note     Name: Ivy Salgado  Clinic Number: [id number]    Therapy Diagnosis:   Encounter Diagnosis   Name Primary?    Decreased range of motion of left shoulder Yes       Physician: Mono Giles III, *    Visit Date: 2/19/2024     Evaluation Date: 8/8/2023  Authorization Period Expiration: 7/6/2024  Plan of Care Expiration: 12/31/2023  Progress Note Due: 9/10/2023  Visit # / Visits authorized: 38/ 40  Foto: 2/3     Time In: 1420  Time Out: 1520  Total Billable Time: 50 minutes     DOS: 7/18/2023  S/p: left  1. Total shoulder arthroplasty (complex, -22 modifier)  2. Shoulder lysis of adhesions  3. Shoulder subpectoral biceps tenodesis (CPT 08100)  Post-Op Precautions: We will follow the shoulder arthroplasty guidelines. The patient remained in a sling for 6 weeks. We will start PT at the 3-week martina.       Precautions: none    SUBJECTIVE     Pt reports:feeling OK     She was compliant with home exercise program.  Response to previous treatment: improvement in pain at rest   Functional change: able to ttol reaching overhead a bit better    Pain: 3/10  Location: left shoulder      OBJECTIVE       Shoulder Active Range of Motion:   Shoulder Right Left   Flexion   150 130   ER at 0   40 30      Shoulder Passive Range of Motion:   Shoulder Right Left   Flexion   160 150   ER at 0   60 40       Strength:   Right Left   Scaption 5/5 3+/5   Shoulder ER at side 5/5 3+/5   Shoulder IR at side  5/5 3+/5            Treatment     Ivy received the treatments listed below:        Manual therapy techniques: Joint mobilizations and Soft tissue Mobilization were applied to the: L shoulder for 10 minutes, including:  Subscap pinning and flexion   Mets for scap activation   METs for post deltoid   Passive ROM of shoulder to improve tolerance to movement     Neuromuscular re-education activities to improve: motor control for 08 min    Ita 3# 4x10        Therapeutic activities to improve functional performance for 30  minutes, including:  Triceps ext 3x15 otb   Bicep curls 4# 4x5  Face pull ytb 3x10   Lat pull down gtb elbows bent 3x10     Patient Education and Home Exercises     Home Exercises Provided and Patient Education Provided     Education provided:   - Add new exercises     Written Home Exercises Provided: yes. Exercises were reviewed and Ivy was able to demonstrate them prior to the end of the session.  Ivy demonstrated good  understanding of the education provided. See EMR under Patient Instructions for exercises provided during therapy sessions    ASSESSMENT     The patient was able to complete all therex with noticeable fatigue but good technique that required some cueing. The patient overall is progressing well with no decline in ROM with start of strengthening.     Ivy Is progressing well towards her goals.     Pt prognosis is Fair.     Pt will continue to benefit from skilled outpatient physical therapy to address the deficits listed in the problem list box on initial evaluation, provide pt/family education and to maximize pt's level of independence in the home and community environment.     Pt's spiritual, cultural and educational needs considered and pt agreeable to plan of care and goals.     Anticipated barriers to physical therapy: none     Goals:   Short Term Goals: 12 weeks  1. Pt will be compliant with HEP 50% of prescribed amount. met  2. The pt to demo improvement in R shoulder PROM to by 80% of the L met  3.  The pt to demo tolerance to being out of the sling for 24 hours with pain <2/10 met     Long Term Goals:  36 weeks   Pt will be compliant with % of prescribed amount.  met  The pt to improvement in AROM of L shoulder within 80% of R shoulder to improve tolerance to OH movement  met  The pt to  Demo at least 4+/5 of RTC muscle testing to demo improvement in tolerance to activity progressing, not met  The pt to  tolerate lifting 50# from the ground to waist height per job requirement description progressing, not met  The pt will report full participation in ADLs and IADLs without restrictions related to L Shoulder.  progressing, not met    PLAN     Follow TSA procedure     Dorothy Basurto, PT,

## 2024-02-22 ENCOUNTER — CLINICAL SUPPORT (OUTPATIENT)
Dept: REHABILITATION | Facility: HOSPITAL | Age: 44
End: 2024-02-22
Payer: COMMERCIAL

## 2024-02-22 DIAGNOSIS — M25.612 DECREASED RANGE OF MOTION OF LEFT SHOULDER: Primary | ICD-10-CM

## 2024-02-22 PROCEDURE — 97530 THERAPEUTIC ACTIVITIES: CPT

## 2024-02-22 PROCEDURE — 97140 MANUAL THERAPY 1/> REGIONS: CPT

## 2024-02-23 NOTE — PROGRESS NOTES
[facility]   Physical Therapy Treatment Note     Name: Ivy Salgado  Clinic Number: [id number]    Therapy Diagnosis:   Encounter Diagnosis   Name Primary?    Decreased range of motion of left shoulder Yes       Physician: Mono Giles III, *    Visit Date: 2/22/2024     Evaluation Date: 8/8/2023  Authorization Period Expiration: 7/6/2024  Plan of Care Expiration: 12/31/2023  Progress Note Due: 9/10/2023  Visit # / Visits authorized: 38/ 40  Foto: 2/3     Time In: 1420  Time Out: 1520  Total Billable Time: 50 minutes     DOS: 7/18/2023  S/p: left  1. Total shoulder arthroplasty (complex, -22 modifier)  2. Shoulder lysis of adhesions  3. Shoulder subpectoral biceps tenodesis (CPT 01141)  Post-Op Precautions: We will follow the shoulder arthroplasty guidelines. The patient remained in a sling for 6 weeks. We will start PT at the 3-week martina.       Precautions: none    SUBJECTIVE     Pt reports:feeling OK     She was compliant with home exercise program.  Response to previous treatment: improvement in pain at rest   Functional change: able to ttol reaching overhead a bit better    Pain: 3/10  Location: left shoulder      OBJECTIVE       Shoulder Active Range of Motion:   Shoulder Right Left   Flexion   150 130   ER at 0   40 30      Shoulder Passive Range of Motion:   Shoulder Right Left   Flexion   160 150   ER at 0   60 40       Strength:   Right Left   Scaption 5/5 3+/5   Shoulder ER at side 5/5 3+/5   Shoulder IR at side  5/5 3+/5            Treatment     Ivy received the treatments listed below:        Manual therapy techniques: Joint mobilizations and Soft tissue Mobilization were applied to the: L shoulder for 10 minutes, including:  Subscap pinning and flexion   Mets for scap activation   METs for post deltoid   Passive ROM of shoulder to improve tolerance to movement     Neuromuscular re-education activities to improve: motor control for 00 min    Ita 3# 4x10        Therapeutic activities to improve functional performance for 20  minutes, including:  Chest press to OH reach 2# 4x8 supine   Face Pulls otb 3x15   Deltoid raises 2# 3x8       Patient Education and Home Exercises     Home Exercises Provided and Patient Education Provided     Education provided:   - Add new exercises     Written Home Exercises Provided: yes. Exercises were reviewed and Ivy was able to demonstrate them prior to the end of the session.  Ivy demonstrated good  understanding of the education provided. See EMR under Patient Instructions for exercises provided during therapy sessions    ASSESSMENT     Due to pt's late arrival, focused on exercises she could do in PT > home. The pt josefa all exercises well. Cont to prog as josefa.     Ivy Is progressing well towards her goals.     Pt prognosis is Fair.     Pt will continue to benefit from skilled outpatient physical therapy to address the deficits listed in the problem list box on initial evaluation, provide pt/family education and to maximize pt's level of independence in the home and community environment.     Pt's spiritual, cultural and educational needs considered and pt agreeable to plan of care and goals.     Anticipated barriers to physical therapy: none     Goals:   Short Term Goals: 12 weeks  1. Pt will be compliant with HEP 50% of prescribed amount. met  2. The pt to demo improvement in R shoulder PROM to by 80% of the L met  3.  The pt to demo tolerance to being out of the sling for 24 hours with pain <2/10 met     Long Term Goals:  36 weeks   Pt will be compliant with % of prescribed amount.  met  The pt to improvement in AROM of L shoulder within 80% of R shoulder to improve tolerance to OH movement  met  The pt to  Demo at least 4+/5 of RTC muscle testing to demo improvement in tolerance to activity progressing, not met  The pt to tolerate lifting 50# from the ground to waist height per job requirement description  progressing, not met  The pt will report full participation in ADLs and IADLs without restrictions related to L Shoulder.  progressing, not met    PLAN     Follow TSA procedure     Dorothy Basurto, PT,

## 2024-02-27 ENCOUNTER — CLINICAL SUPPORT (OUTPATIENT)
Dept: REHABILITATION | Facility: HOSPITAL | Age: 44
End: 2024-02-27
Payer: COMMERCIAL

## 2024-02-27 DIAGNOSIS — M25.612 DECREASED RANGE OF MOTION OF LEFT SHOULDER: Primary | ICD-10-CM

## 2024-02-27 PROCEDURE — 97530 THERAPEUTIC ACTIVITIES: CPT

## 2024-02-27 PROCEDURE — 97112 NEUROMUSCULAR REEDUCATION: CPT

## 2024-02-27 PROCEDURE — 97140 MANUAL THERAPY 1/> REGIONS: CPT

## 2024-02-27 NOTE — PROGRESS NOTES
[facility]   Physical Therapy Treatment Note     Name: Ivy Salgado  Clinic Number: [id number]    Therapy Diagnosis:   Encounter Diagnosis   Name Primary?    Decreased range of motion of left shoulder Yes       Physician: Mono Giles III, *    Visit Date: 2/27/2024     Evaluation Date: 8/8/2023  Authorization Period Expiration: 7/6/2024  Plan of Care Expiration: 12/31/2023  Progress Note Due: 9/10/2023  Visit # / Visits authorized: 8/ 20  Foto: 2/3     Time In: 1500  Time Out: 1600  Total Billable Time: 50 minutes     DOS: 7/18/2023  S/p: left  1. Total shoulder arthroplasty (complex, -22 modifier)  2. Shoulder lysis of adhesions  3. Shoulder subpectoral biceps tenodesis (CPT 19154)  Post-Op Precautions: We will follow the shoulder arthroplasty guidelines. The patient remained in a sling for 6 weeks. We will start PT at the 3-week martina.       Precautions: none    SUBJECTIVE     Pt reports:feeling stiff today since she has been working out     She was compliant with home exercise program.  Response to previous treatment: improvement in pain at rest   Functional change: able to ttol reaching overhead a bit better    Pain: 3/10  Location: left shoulder      OBJECTIVE       Shoulder Active Range of Motion:   Shoulder Right Left   Flexion   150 110 pre 125 post    ER at 0   40 30      Shoulder Passive Range of Motion:   Shoulder Right Left   Flexion   160 150   ER at 0   60 40       Strength:   Right Left   Scaption 5/5 3+/5   Shoulder ER at side 5/5 3+/5   Shoulder IR at side  5/5 3+/5            Treatment     Ivy received the treatments listed below:        Manual therapy techniques: Joint mobilizations and Soft tissue Mobilization were applied to the: L shoulder for 10 minutes, including:  Thoracic manip   Scap mobs   STM to Subscap     Neuromuscular re-education activities to improve: motor control for 20 min  Hand heel rocks 20x   Wall slides thoracic mobility focus 20x      Therapeutic activities to improve functional performance for 20  minutes, including:  Thoracic rotations 15x B   Front Raises 2# 4x8   Lateral raises 4x8       Patient Education and Home Exercises     Home Exercises Provided and Patient Education Provided     Education provided:   - Add new exercises     Written Home Exercises Provided: yes. Exercises were reviewed and Ivy was able to demonstrate them prior to the end of the session.  Ivy demonstrated good  understanding of the education provided. See EMR under Patient Instructions for exercises provided during therapy sessions    ASSESSMENT     The patient with increased thoracic spine hypomobility today impacting scapulothoracic movement and causing a decrease in ROM. Responded well to manual therapy to resolve tightness. The patient therex cont to focus on mobility and strengthening as josefa.     Ivy Is progressing well towards her goals.     Pt prognosis is Fair.     Pt will continue to benefit from skilled outpatient physical therapy to address the deficits listed in the problem list box on initial evaluation, provide pt/family education and to maximize pt's level of independence in the home and community environment.     Pt's spiritual, cultural and educational needs considered and pt agreeable to plan of care and goals.     Anticipated barriers to physical therapy: none     Goals:   Short Term Goals: 12 weeks  1. Pt will be compliant with HEP 50% of prescribed amount. met  2. The pt to demo improvement in R shoulder PROM to by 80% of the L met  3.  The pt to demo tolerance to being out of the sling for 24 hours with pain <2/10 met     Long Term Goals:  36 weeks   Pt will be compliant with % of prescribed amount.  met  The pt to improvement in AROM of L shoulder within 80% of R shoulder to improve tolerance to OH movement  met  The pt to  Demo at least 4+/5 of RTC muscle testing to demo improvement in tolerance to activity progressing, not  met  The pt to tolerate lifting 50# from the ground to waist height per job requirement description progressing, not met  The pt will report full participation in ADLs and IADLs without restrictions related to L Shoulder.  progressing, not met    PLAN     Follow TSA procedure     Dorothy Basurto, PT,

## 2024-02-29 ENCOUNTER — CLINICAL SUPPORT (OUTPATIENT)
Dept: REHABILITATION | Facility: HOSPITAL | Age: 44
End: 2024-02-29
Payer: COMMERCIAL

## 2024-02-29 DIAGNOSIS — M25.612 DECREASED RANGE OF MOTION OF LEFT SHOULDER: Primary | ICD-10-CM

## 2024-02-29 LAB — VASOACTIVE INTESTINAL POLYPEPTIDE PLASMA: <50 PG/ML

## 2024-02-29 PROCEDURE — 97112 NEUROMUSCULAR REEDUCATION: CPT

## 2024-02-29 PROCEDURE — 97530 THERAPEUTIC ACTIVITIES: CPT

## 2024-02-29 PROCEDURE — 97140 MANUAL THERAPY 1/> REGIONS: CPT

## 2024-02-29 NOTE — PROGRESS NOTES
[facility]   Physical Therapy Treatment Note     Name: Ivy Salgado  Clinic Number: [id number]    Therapy Diagnosis:   Encounter Diagnosis   Name Primary?    Decreased range of motion of left shoulder Yes       Physician: Mono Giles III, *    Visit Date: 2/29/2024     Evaluation Date: 8/8/2023  Authorization Period Expiration: 7/6/2024  Plan of Care Expiration: 12/31/2023  Progress Note Due: 9/10/2023  Visit # / Visits authorized: 9/ 20  Foto: 3/3     Time In: 1500  Time Out: 1600  Total Billable Time: 50 minutes     DOS: 7/18/2023  S/p: left  1. Total shoulder arthroplasty (complex, -22 modifier)  2. Shoulder lysis of adhesions  3. Shoulder subpectoral biceps tenodesis (CPT 55142)  Post-Op Precautions: We will follow the shoulder arthroplasty guidelines. The patient remained in a sling for 6 weeks. We will start PT at the 3-week martina.       Precautions: none    SUBJECTIVE     Pt reports: she has been working out and her shoulder is really sore today, can tell she has been using it.     She was compliant with home exercise program.  Response to previous treatment: improvement in pain at rest   Functional change: able to ttol reaching overhead a bit better    Pain: 3/10  Location: left shoulder      OBJECTIVE       Shoulder Active Range of Motion:   Shoulder Right Left   Flexion   150 110 pre 125 post    ER at 0   40 30      Shoulder Passive Range of Motion:   Shoulder Right Left   Flexion   160 150   ER at 0   60 40       Strength:   Right Left   Scaption 5/5 3+/5   Shoulder ER at side 5/5 3+/5   Shoulder IR at side  5/5 3+/5            Treatment     Ivy received the treatments listed below:        Manual therapy techniques: Joint mobilizations and Soft tissue Mobilization were applied to the: L shoulder for 10 minutes, including:  Thoracic manip   Scap mobs   STM to Subscap     Neuromuscular re-education activities to improve: motor control for 20 min  Hand heel rocks 30x    Want flexion 4x8 3#     Therapeutic activities to improve functional performance for 20  minutes, including:  Thoracic rotations 15x B   Face Pulls 3x15 otb   Resisted IR otb 3x15         Patient Education and Home Exercises     Home Exercises Provided and Patient Education Provided     Education provided:   - Add new exercises     Written Home Exercises Provided: yes. Exercises were reviewed and Ivy was able to demonstrate them prior to the end of the session.  Ivy demonstrated good  understanding of the education provided. See EMR under Patient Instructions for exercises provided during therapy sessions    ASSESSMENT     The patient able to complete therex that focused on ms actiavation and recovery from her workouts this week. Overall the patient is progressing well.     Ivy Is progressing well towards her goals.     Pt prognosis is Fair.     Pt will continue to benefit from skilled outpatient physical therapy to address the deficits listed in the problem list box on initial evaluation, provide pt/family education and to maximize pt's level of independence in the home and community environment.     Pt's spiritual, cultural and educational needs considered and pt agreeable to plan of care and goals.     Anticipated barriers to physical therapy: none     Goals:   Short Term Goals: 12 weeks  1. Pt will be compliant with HEP 50% of prescribed amount. met  2. The pt to demo improvement in R shoulder PROM to by 80% of the L met  3.  The pt to demo tolerance to being out of the sling for 24 hours with pain <2/10 met     Long Term Goals:  36 weeks   Pt will be compliant with % of prescribed amount.  met  The pt to improvement in AROM of L shoulder within 80% of R shoulder to improve tolerance to OH movement  met  The pt to  Demo at least 4+/5 of RTC muscle testing to demo improvement in tolerance to activity progressing, not met  The pt to tolerate lifting 50# from the ground to waist height per job  requirement description progressing, not met  The pt will report full participation in ADLs and IADLs without restrictions related to L Shoulder.  progressing, not met    PLAN     Follow TSA procedure     Dorothy Basurto, PT,

## 2024-03-04 ENCOUNTER — OFFICE VISIT (OUTPATIENT)
Dept: SPORTS MEDICINE | Facility: CLINIC | Age: 44
End: 2024-03-04
Payer: COMMERCIAL

## 2024-03-04 ENCOUNTER — LAB VISIT (OUTPATIENT)
Dept: LAB | Facility: HOSPITAL | Age: 44
End: 2024-03-04
Attending: STUDENT IN AN ORGANIZED HEALTH CARE EDUCATION/TRAINING PROGRAM
Payer: COMMERCIAL

## 2024-03-04 ENCOUNTER — PATIENT MESSAGE (OUTPATIENT)
Dept: REHABILITATION | Facility: HOSPITAL | Age: 44
End: 2024-03-04

## 2024-03-04 ENCOUNTER — OFFICE VISIT (OUTPATIENT)
Dept: OBSTETRICS AND GYNECOLOGY | Facility: CLINIC | Age: 44
End: 2024-03-04
Payer: COMMERCIAL

## 2024-03-04 ENCOUNTER — HOSPITAL ENCOUNTER (OUTPATIENT)
Dept: RADIOLOGY | Facility: HOSPITAL | Age: 44
Discharge: HOME | End: 2024-03-04
Attending: ORTHOPAEDIC SURGERY
Payer: COMMERCIAL

## 2024-03-04 VITALS
WEIGHT: 123.44 LBS | HEART RATE: 71 BPM | DIASTOLIC BLOOD PRESSURE: 58 MMHG | BODY MASS INDEX: 23.33 KG/M2 | SYSTOLIC BLOOD PRESSURE: 99 MMHG

## 2024-03-04 VITALS
WEIGHT: 123.69 LBS | BODY MASS INDEX: 23.37 KG/M2 | DIASTOLIC BLOOD PRESSURE: 80 MMHG | SYSTOLIC BLOOD PRESSURE: 110 MMHG

## 2024-03-04 DIAGNOSIS — T38.0X5A AVASCULAR NECROSIS OF LEFT SHOULDER DUE TO ADVERSE EFFECT OF STEROID THERAPY: ICD-10-CM

## 2024-03-04 DIAGNOSIS — M25.512 LEFT SHOULDER PAIN, UNSPECIFIED CHRONICITY: Primary | ICD-10-CM

## 2024-03-04 DIAGNOSIS — Z11.3 SCREEN FOR STD (SEXUALLY TRANSMITTED DISEASE): Primary | ICD-10-CM

## 2024-03-04 DIAGNOSIS — Z98.890 S/P SHOULDER SURGERY: ICD-10-CM

## 2024-03-04 DIAGNOSIS — G89.29 CHRONIC LEFT SHOULDER PAIN: ICD-10-CM

## 2024-03-04 DIAGNOSIS — M87.019 AVASCULAR NECROSIS OF BONE OF SHOULDER: ICD-10-CM

## 2024-03-04 DIAGNOSIS — M25.512 CHRONIC LEFT SHOULDER PAIN: ICD-10-CM

## 2024-03-04 DIAGNOSIS — M87.112 AVASCULAR NECROSIS OF LEFT SHOULDER DUE TO ADVERSE EFFECT OF STEROID THERAPY: ICD-10-CM

## 2024-03-04 DIAGNOSIS — M25.512 LEFT SHOULDER PAIN, UNSPECIFIED CHRONICITY: ICD-10-CM

## 2024-03-04 DIAGNOSIS — Z11.3 SCREEN FOR STD (SEXUALLY TRANSMITTED DISEASE): ICD-10-CM

## 2024-03-04 PROCEDURE — 1160F RVW MEDS BY RX/DR IN RCRD: CPT | Mod: CPTII,S$GLB,, | Performed by: STUDENT IN AN ORGANIZED HEALTH CARE EDUCATION/TRAINING PROGRAM

## 2024-03-04 PROCEDURE — 80074 ACUTE HEPATITIS PANEL: CPT | Performed by: STUDENT IN AN ORGANIZED HEALTH CARE EDUCATION/TRAINING PROGRAM

## 2024-03-04 PROCEDURE — 99999 PR PBB SHADOW E&M-EST. PATIENT-LVL III: CPT | Mod: PBBFAC,,, | Performed by: STUDENT IN AN ORGANIZED HEALTH CARE EDUCATION/TRAINING PROGRAM

## 2024-03-04 PROCEDURE — 86592 SYPHILIS TEST NON-TREP QUAL: CPT | Performed by: STUDENT IN AN ORGANIZED HEALTH CARE EDUCATION/TRAINING PROGRAM

## 2024-03-04 PROCEDURE — 73030 X-RAY EXAM OF SHOULDER: CPT | Mod: 26,LT,, | Performed by: RADIOLOGY

## 2024-03-04 PROCEDURE — 3008F BODY MASS INDEX DOCD: CPT | Mod: CPTII,S$GLB,, | Performed by: STUDENT IN AN ORGANIZED HEALTH CARE EDUCATION/TRAINING PROGRAM

## 2024-03-04 PROCEDURE — 99999 PR PBB SHADOW E&M-EST. PATIENT-LVL III: CPT | Mod: PBBFAC,,, | Performed by: ORTHOPAEDIC SURGERY

## 2024-03-04 PROCEDURE — 3074F SYST BP LT 130 MM HG: CPT | Mod: CPTII,S$GLB,, | Performed by: ORTHOPAEDIC SURGERY

## 2024-03-04 PROCEDURE — 87389 HIV-1 AG W/HIV-1&-2 AB AG IA: CPT | Performed by: STUDENT IN AN ORGANIZED HEALTH CARE EDUCATION/TRAINING PROGRAM

## 2024-03-04 PROCEDURE — 3074F SYST BP LT 130 MM HG: CPT | Mod: CPTII,S$GLB,, | Performed by: STUDENT IN AN ORGANIZED HEALTH CARE EDUCATION/TRAINING PROGRAM

## 2024-03-04 PROCEDURE — 99213 OFFICE O/P EST LOW 20 MIN: CPT | Mod: S$GLB,,, | Performed by: STUDENT IN AN ORGANIZED HEALTH CARE EDUCATION/TRAINING PROGRAM

## 2024-03-04 PROCEDURE — 99214 OFFICE O/P EST MOD 30 MIN: CPT | Mod: S$GLB,,, | Performed by: ORTHOPAEDIC SURGERY

## 2024-03-04 PROCEDURE — 73030 X-RAY EXAM OF SHOULDER: CPT | Mod: TC,LT

## 2024-03-04 PROCEDURE — 87491 CHLMYD TRACH DNA AMP PROBE: CPT | Performed by: STUDENT IN AN ORGANIZED HEALTH CARE EDUCATION/TRAINING PROGRAM

## 2024-03-04 PROCEDURE — 3078F DIAST BP <80 MM HG: CPT | Mod: CPTII,S$GLB,, | Performed by: ORTHOPAEDIC SURGERY

## 2024-03-04 PROCEDURE — 3044F HG A1C LEVEL LT 7.0%: CPT | Mod: CPTII,S$GLB,, | Performed by: ORTHOPAEDIC SURGERY

## 2024-03-04 PROCEDURE — 1159F MED LIST DOCD IN RCRD: CPT | Mod: CPTII,S$GLB,, | Performed by: STUDENT IN AN ORGANIZED HEALTH CARE EDUCATION/TRAINING PROGRAM

## 2024-03-04 PROCEDURE — 36415 COLL VENOUS BLD VENIPUNCTURE: CPT | Performed by: STUDENT IN AN ORGANIZED HEALTH CARE EDUCATION/TRAINING PROGRAM

## 2024-03-04 PROCEDURE — 3008F BODY MASS INDEX DOCD: CPT | Mod: CPTII,S$GLB,, | Performed by: ORTHOPAEDIC SURGERY

## 2024-03-04 PROCEDURE — 81514 NFCT DS BV&VAGINITIS DNA ALG: CPT | Performed by: STUDENT IN AN ORGANIZED HEALTH CARE EDUCATION/TRAINING PROGRAM

## 2024-03-04 PROCEDURE — 3079F DIAST BP 80-89 MM HG: CPT | Mod: CPTII,S$GLB,, | Performed by: STUDENT IN AN ORGANIZED HEALTH CARE EDUCATION/TRAINING PROGRAM

## 2024-03-04 PROCEDURE — 3044F HG A1C LEVEL LT 7.0%: CPT | Mod: CPTII,S$GLB,, | Performed by: STUDENT IN AN ORGANIZED HEALTH CARE EDUCATION/TRAINING PROGRAM

## 2024-03-04 NOTE — PROGRESS NOTES
Chief Complaint: left shoulder pain    HISTORY OF PRESENT ILLNESS:   Pt is here today for 8 month post-operative followup of the below listed surgery.  she is doing well.  Still in PT at [place]. Pain has much improved and she is happy with her motion.       Pain today 1/10  SANE: 80 /100      DATE OF PROCEDURE: 07/18/2023  SURGEON: Sharon Babb M.D.   PROCEDURE PERFORMED:   left  1. Total shoulder arthroplasty (complex, -22 modifier)  2. Shoulder lysis of adhesions  3. Shoulder subpectoral biceps tenodesis (CPT 06354)      PAST MEDICAL HISTORY:   Past Medical History:   Diagnosis Date    Abnormal Pap smear 2007    Alkaline phosphatase elevation- based on lab from 4/9/2019 05/28/2019    Arthritis     Avascular necrosis of bone of hip, left     Bacterial vaginosis     Depression 08/05/2017    Drug-induced pancreatitis (imuran)     Generalized anxiety disorder     Genital HSV     GERD (gastroesophageal reflux disease)     Hypertension     Ileostomy in place 07/09/2012    Kidney stone     Long QT syndrome     Melanoma in situ (excised-buttocks 2018, para-stomal site 2019)     Moderate episode of recurrent major depressive disorder 02/02/2024    Multiple thyroid nodules 11/27/2018    Osteopenia of multiple sites 01/07/2019    Pancreas cyst (tail, 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)     Recurrent Clostridioides difficile diarrhea     Recurrent UTI 04/03/2013     PAST SURGICAL HISTORY:   Past Surgical History:   Procedure Laterality Date    ABDOMINAL SURGERY      APPENDECTOMY      ARTHROPLASTY OF SHOULDER Left 7/18/2023    Procedure: ARTHROPLASTY, SHOULDER;  Surgeon: Sharon Babb MD;  Location: [facility];  Service: Orthopedics;  Laterality: Left;    AUGMENTATION OF BREAST Bilateral 06/2022    gel implants    BILATERAL SALPINGO-OOPHORECTOMY (BSO) Bilateral 05/30/2019    Procedure: SALPINGO-OOPHORECTOMY, BILATERAL;  Surgeon: Rupa German MD;  Location: [address];  Service: OB/GYN;  Laterality: Bilateral;    BLADDER SURGERY      partial  cystectomy due to fistula    breast lift      BREAST SURGERY      [facility]      COLON SURGERY      COLONOSCOPY      CYSTOSCOPY  09/23/2020    Procedure: CYSTOSCOPY;  Surgeon: Sascha Florentino MD;  Location: [facility] OR 1ST FLR;  Service: Urology;;    CYSTOSCOPY W/ URETERAL STENT PLACEMENT Left 09/15/2020    Procedure: CYSTOSCOPY, WITH URETERAL STENT INSERTION;  Surgeon: Sascha Florentino MD;  Location: [facility] OR 1ST FLR;  Service: Urology;  Laterality: Left;    DIAGNOSTIC LAPAROSCOPY N/A 07/09/2020    Procedure: LAPAROSCOPY, DIAGNOSTIC;  Surgeon: Gilson El MD;  Location: [facility] OR;  Service: OB/GYN;  Laterality: N/A;    ESOPHAGOGASTRODUODENOSCOPY N/A 1/3/2024    Procedure: EGD (ESOPHAGOGASTRODUODENOSCOPY);  Surgeon: Lily Lind MD;  Location: [facility] ENDO (2ND FLR);  Service: Endoscopy;  Laterality: N/A;    EXCISION OF MELANOMA  07/17/2019    ILEOSCOPY N/A 1/3/2024    Procedure: ILEOSCOPY;  Surgeon: Lily Lind MD;  Location: [facility] ENDO (2ND FLR);  Service: Endoscopy;  Laterality: N/A;    ILEOSCOPY N/A 1/24/2024    Procedure: ILEOSCOPY;  Surgeon: Lilian Navarro MD;  Location: [facility] ENDO (2ND FLR);  Service: Endoscopy;  Laterality: N/A;  through stoma    ILEOSTOMY      LAPAROSCOPIC LYSIS OF ADHESIONS N/A 07/09/2020    Procedure: LYSIS, ADHESIONS, LAPAROSCOPIC;  Surgeon: Gilson El MD;  Location: [facility] OR;  Service: OB/GYN;  Laterality: N/A;    LASER LITHOTRIPSY  09/23/2020    Procedure: LITHOTRIPSY, USING LASER;  Surgeon: Sascha Florentino MD;  Location: [facility] OR 1ST FLR;  Service: Urology;;    LYSIS OF ADHESIONS N/A 05/30/2019    Procedure: LYSIS, ADHESIONS;  Surgeon: Rupa German MD;  Location: [facility] OR 2ND FLR;  Service: OB/GYN;  Laterality: N/A;    OOPHORECTOMY Right 04/16/2015    PORTACATH PLACEMENT  02/21/2017    SKIN BIOPSY      SMALL INTESTINE SURGERY      age 18 Y    TOTAL ABDOMINAL HYSTERECTOMY  04/16/2015    TOTAL COLECTOMY      TUBAL LIGATION  06/06/2012    UPPER GASTROINTESTINAL  ENDOSCOPY      URETEROSCOPIC REMOVAL OF URETERIC CALCULUS  09/23/2020    Procedure: REMOVAL, CALCULUS, URETER, URETEROSCOPIC;  Surgeon: Sascha Florentino MD;  Location: [facility] OR [address] [place] [address];  Service: Urology;;    URETEROSCOPY Left 09/23/2020    Procedure: URETEROSCOPY;  Surgeon: Sascha Florentino MD;  Location: [facility] OR [address] [place] [address];  Service: Urology;  Laterality: Left;     FAMILY HISTORY:   Family History   Problem Relation Age of Onset    Diabetes Paternal Grandfather     Hearing loss Paternal Grandmother     Cancer Maternal Grandfather         Skin    Skin cancer Maternal Grandfather     Diabetes Maternal Grandfather     Heart disease Maternal Grandfather     Colon cancer Father     Cancer Father         Colon    Liver cancer Father     Hyperlipidemia Father     Hypertension Mother     Crohn's disease Brother     Endometrial cancer Maternal Aunt     Breast cancer Maternal Cousin 41    Crohn's disease Daughter     Ovarian cancer Neg Hx     Melanoma Neg Hx      SOCIAL HISTORY:   Social History     Socioeconomic History    Marital status: Single   Occupational History     Employer: [employer]   Tobacco Use    Smoking status: Never    Smokeless tobacco: Never   Substance and Sexual Activity    Alcohol use: Not Currently     Alcohol/week: 0.0 standard drinks of alcohol    Drug use: No    Sexual activity: Yes     Partners: Male     Birth control/protection: See Surgical Hx     Comment: HYST   Other Topics Concern    Are you pregnant or think you may be? No    Breast-feeding No     Social Determinants of Health     Financial Resource Strain: Low Risk  (1/17/2024)    Overall Financial Resource Strain (CARDIA)     Difficulty of Paying Living Expenses: Not hard at all   Recent Concern: Financial Resource Strain - High Risk (10/24/2023)    Overall Financial Resource Strain (CARDIA)     Difficulty of Paying Living Expenses: Hard   Food Insecurity: No Food Insecurity (1/17/2024)    Hunger Vital Sign     Worried  About Running Out of Food in the Last Year: Never true     Ran Out of Food in the Last Year: Never true   Recent Concern: Food Insecurity - Food Insecurity Present (10/24/2023)    Hunger Vital Sign     Worried About Running Out of Food in the Last Year: Sometimes true     Ran Out of Food in the Last Year: Sometimes true   Transportation Needs: No Transportation Needs (1/17/2024)    PRAPARE - Transportation     Lack of Transportation (Medical): No     Lack of Transportation (Non-Medical): No   Recent Concern: Transportation Needs - Unmet Transportation Needs (10/24/2023)    PRAPARE - Transportation     Lack of Transportation (Medical): Yes     Lack of Transportation (Non-Medical): Yes   Physical Activity: Insufficiently Active (1/17/2024)    Exercise Vital Sign     Days of Exercise per Week: 3 days     Minutes of Exercise per Session: 30 min   Stress: No Stress Concern Present (1/17/2024)    [employer] [employer] - Occupational Stress Questionnaire     Feeling of Stress : Not at all   Recent Concern: Stress - Stress Concern Present (10/24/2023)    [employer] [place] [employer] - Occupational Stress Questionnaire     Feeling of Stress : Rather much   Social Connections: Unknown (1/17/2024)    Social Connection and Isolation Panel [NHANES]     Frequency of Communication with Friends and Family: More than three times a week     Frequency of Social Gatherings with Friends and Family: More than three times a week     Attends Sikh Services: Patient declined     Active Member of Clubs or Organizations: Patient declined     Attends Club or Organization Meetings: Patient declined     Marital Status: Never    Housing Stability: Low Risk  (1/17/2024)    Housing Stability Vital Sign     Unable to Pay for Housing in the Last Year: No     Number of Places Lived in the Last Year: 1     Unstable Housing in the Last Year: No   Recent Concern: Housing Stability - High Risk (10/24/2023)     Housing Stability Vital Sign     Unable to Pay for Housing in the Last Year: Yes     Number of Places Lived in the Last Year: 2     Unstable Housing in the Last Year: No       MEDICATIONS:   Current Outpatient Medications:     clonazePAM (KLONOPIN) 1 MG tablet, Take 1 tablet (1 mg total) by mouth 2 (two) times daily., Disp: 60 tablet, Rfl: 2    cyclobenzaprine (FLEXERIL) 10 MG tablet, Take 1 tablet (10 mg total) by mouth every evening as needed for muscle pain, Disp: 15 tablet, Rfl: 0    diphenoxylate-atropine 2.5-0.025 mg/5 ml (LOMOTIL) 2.5-0.025 mg/5 mL liquid, Take 2.5-5 ml up to 4 times a day - dose might change at visit on 1/30, Disp: 300 mL, Rfl: 0    estradiol 0.025 mg/24 hr 0.025 mg/24 hr, Place 1 patch onto the skin once a week., Disp: 4 patch, Rfl: 11    gabapentin (NEURONTIN) 300 MG capsule, Take 1 capsule (300 mg total) by mouth 2 (two) times daily., Disp: 60 capsule, Rfl: 11    mirtazapine (REMERON SOL-TAB) 30 MG disintegrating tablet, Take 1 tablet (30 mg total) by mouth nightly., Disp: 30 tablet, Rfl: 0    multivitamin (THERAGRAN) per tablet, Take 1 tablet by mouth once daily., Disp: , Rfl:     nadoloL (CORGARD) 40 MG tablet, Take 1 tablet (40 mg total) by mouth once daily. Patient needs appointment before next refill., Disp: 30 tablet, Rfl: 2    pantoprazole (PROTONIX) 40 MG tablet, Take 1 tablet (40 mg total) by mouth 2 (two) times daily., Disp: 60 tablet, Rfl: 12    valACYclovir (VALTREX) 500 MG tablet, Take 1 tablet (500 mg total) by mouth once daily., Disp: 90 tablet, Rfl: 3    zolpidem (AMBIEN) 10 mg Tab, Take 1 tablet (10 mg total) by mouth every evening., Disp: 30 tablet, Rfl: 2    droNABinol (MARINOL) 5 MG capsule, Take 1 capsule (5 mg total) by mouth 2 (two) times daily before meals. (Patient not taking: Reported on 3/4/2024), Disp: 60 capsule, Rfl: 1    tamsulosin (FLOMAX) 0.4 mg Cap, Take 1 capsule (0.4 mg total) by mouth once daily., Disp: 30 capsule, Rfl: 1    vedolizumab (ENTYVIO) 300  mg SolR injection, Inject 300 mg into the vein., Disp: , Rfl:     Current Facility-Administered Medications:     estradiol valerate (DELESTROGEN) injection 20 mg/mL, 20 mg, Intramuscular, Q30 Days, Gilson El MD, 20 mg at 12/30/22 0902  ALLERGIES:   Review of patient's allergies indicates:   Allergen Reactions    Azathioprine sodium Other (See Comments)     Other reaction(s): pancreatitis  Other reaction(s): pancreatitis    Methotrexate analogues Other (See Comments)     leukopenia    Stelara [ustekinumab] Other (See Comments)     Multiple infections    Zofran [ondansetron hcl (pf)] Other (See Comments)     Per patient causes prolong QT    Vancomycin analogues Other (See Comments)     Made her red    Azathioprine      Other reaction(s): Unknown    Methotrexate      Other reaction(s): infection-    Morphine Itching and Other (See Comments)     Other reaction(s): Itching    Zofran [ondansetron hcl]      Other reaction(s): Hives    Bactrim [sulfamethoxazole-trimethoprim] Palpitations    Ciprofloxacin Palpitations       VITAL SIGNS: BP (!) 99/58   Pulse 71   Wt 56 kg (123 lb 7.3 oz)   LMP 03/30/2015   BMI 23.33 kg/m²      Review of Systems   Constitution: Negative. Negative for chills, fever and night sweats.   HENT: Negative for congestion and headaches.    Eyes: Negative for blurred vision, left vision loss and right vision loss.   Cardiovascular: Negative for chest pain and syncope.   Respiratory: Negative for cough and shortness of breath.    Endocrine: Negative for polydipsia, polyphagia and polyuria.   Hematologic/Lymphatic: Negative for bleeding problem. Does not bruise/bleed easily.   Skin: Negative for dry skin, itching and rash.   Musculoskeletal: Negative for falls and muscle weakness.   Gastrointestinal: Negative for abdominal pain and bowel incontinence.   Genitourinary: Negative for bladder incontinence and nocturia.   Neurological: Negative for disturbances in coordination, loss of balance and  seizures.   Psychiatric/Behavioral: Negative for depression. The patient does not have insomnia.    Allergic/Immunologic: Negative for hives and persistent infections.                                                     PHYSICAL EXAMINATION:     Incision sites healed well  No evidence of any erythema, infection or induration  elbow Range of motion full   No effusion  2+ DP pulse  No swelling    ROM:      Forward Elevation: 170  ER: 45  IR: T10    Scaption: 4+/5 at 0 degrees, 4+/5 at 30 degrees  ER: 5/5  IR: 5/5       RADIOGRAPHS reviewed and interpreted by myself:  2 views.  Postoperative changes are again identified relating to a prior left shoulder arthroplasty procedure, prostheses appearing unremarkable.                                                                             ASSESSMENT:                                                                                                                                               1. Status post above, doing well.                                                                                                                               PLAN:                                                                                                                                                     1. Continue PT, case discussed with therapist.   2. Emphasized scapular function.  3. I have discussed return to activity in detail, OK to return to full duty at work   4. she will see us back in 4 months at 1 year post op.

## 2024-03-04 NOTE — PROGRESS NOTES
History & Physical  Gynecology      SUBJECTIVE:     Chief Complaint: STD CHECK       History of Present Illness:  Ivy is a 43 yr old  who presents to clinic requesting STD testing. She denies symptoms at this time but wishes to undergo before starting a new sexual relationship with a former partner.     Review of patient's allergies indicates:   Allergen Reactions    Azathioprine sodium Other (See Comments)     Other reaction(s): pancreatitis  Other reaction(s): pancreatitis    Methotrexate analogues Other (See Comments)     leukopenia    Stelara [ustekinumab] Other (See Comments)     Multiple infections    Zofran [ondansetron hcl (pf)] Other (See Comments)     Per patient causes prolong QT    Vancomycin analogues Other (See Comments)     Made her red    Azathioprine      Other reaction(s): Unknown    Methotrexate      Other reaction(s): infection-    Morphine Itching and Other (See Comments)     Other reaction(s): Itching    Zofran [ondansetron hcl]      Other reaction(s): Hives    Bactrim [sulfamethoxazole-trimethoprim] Palpitations    Ciprofloxacin Palpitations       Past Medical History:   Diagnosis Date    Abnormal Pap smear     Alkaline phosphatase elevation- based on lab from 2019    Arthritis     Avascular necrosis of bone of hip, left     Bacterial vaginosis     Depression 2017    Drug-induced pancreatitis (imuran)     Generalized anxiety disorder     Genital HSV     GERD (gastroesophageal reflux disease)     Hypertension     Ileostomy in place 2012    Kidney stone     Long QT syndrome     Melanoma in situ (excised-buttocks , para-stomal site )     Moderate episode of recurrent major depressive disorder 2024    Multiple thyroid nodules 2018    Osteopenia of multiple sites 2019    Pancreas cyst (tail, 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)     Recurrent Clostridioides difficile diarrhea     Recurrent UTI 2013     Past Surgical History:   Procedure Laterality  Date    ABDOMINAL SURGERY      APPENDECTOMY      ARTHROPLASTY OF SHOULDER Left 7/18/2023    Procedure: ARTHROPLASTY, SHOULDER;  Surgeon: Sharon Babb MD;  Location: [facility] OR;  Service: Orthopedics;  Laterality: Left;    AUGMENTATION OF BREAST Bilateral 06/2022    gel implants    BILATERAL SALPINGO-OOPHORECTOMY (BSO) Bilateral 05/30/2019    Procedure: SALPINGO-OOPHORECTOMY, BILATERAL;  Surgeon: Rupa German MD;  Location: [address];  Service: OB/GYN;  Laterality: Bilateral;    BLADDER SURGERY      partial cystectomy due to fistula    breast lift      BREAST SURGERY      [facility]      COLON SURGERY      COLONOSCOPY      CYSTOSCOPY  09/23/2020    Procedure: CYSTOSCOPY;  Surgeon: Sascha Florentino MD;  Location: [facility] OR [address] [place] [address];  Service: Urology;;    CYSTOSCOPY W/ URETERAL STENT PLACEMENT Left 09/15/2020    Procedure: CYSTOSCOPY, WITH URETERAL STENT INSERTION;  Surgeon: Sascha Florentino MD;  Location: [address];  Service: Urology;  Laterality: Left;    DIAGNOSTIC LAPAROSCOPY N/A 07/09/2020    Procedure: LAPAROSCOPY, DIAGNOSTIC;  Surgeon: Gilson El MD;  Location: [facility] OR;  Service: OB/GYN;  Laterality: N/A;    ESOPHAGOGASTRODUODENOSCOPY N/A 1/3/2024    Procedure: EGD (ESOPHAGOGASTRODUODENOSCOPY);  Surgeon: Lily Lind MD;  Location: [address]);  Service: Endoscopy;  Laterality: N/A;    EXCISION OF MELANOMA  07/17/2019    ILEOSCOPY N/A 1/3/2024    Procedure: ILEOSCOPY;  Surgeon: Lily Lind MD;  Location: [facility] (2ND FLR);  Service: Endoscopy;  Laterality: N/A;    ILEOSCOPY N/A 1/24/2024    Procedure: ILEOSCOPY;  Surgeon: Lilian Navarro MD;  Location: [facility] (2ND FLR);  Service: Endoscopy;  Laterality: N/A;  through stoma    ILEOSTOMY      LAPAROSCOPIC LYSIS OF ADHESIONS N/A 07/09/2020    Procedure: LYSIS, ADHESIONS, LAPAROSCOPIC;  Surgeon: Gilson El MD;  Location: [facility] OR;  Service: OB/GYN;  Laterality: N/A;    LASER LITHOTRIPSY  09/23/2020    Procedure:  LITHOTRIPSY, USING LASER;  Surgeon: Sascha Florentino MD;  Location: [facility] OR 1ST FLR;  Service: Urology;;    LYSIS OF ADHESIONS N/A 2019    Procedure: LYSIS, ADHESIONS;  Surgeon: Rupa German MD;  Location: [facility] OR 2ND FLR;  Service: OB/GYN;  Laterality: N/A;    OOPHORECTOMY Right 2015    PORTACATH PLACEMENT  2017    SKIN BIOPSY      SMALL INTESTINE SURGERY      age 18 Y    TOTAL ABDOMINAL HYSTERECTOMY  2015    TOTAL COLECTOMY      TUBAL LIGATION  2012    UPPER GASTROINTESTINAL ENDOSCOPY      URETEROSCOPIC REMOVAL OF URETERIC CALCULUS  2020    Procedure: REMOVAL, CALCULUS, URETER, URETEROSCOPIC;  Surgeon: Sascha Florentino MD;  Location: [facility] OR 1ST FLR;  Service: Urology;;    URETEROSCOPY Left 2020    Procedure: URETEROSCOPY;  Surgeon: Sascha Florentino MD;  Location: [facility] OR 1ST FLR;  Service: Urology;  Laterality: Left;     OB History          2    Para   1    Term   1       0    AB   1    Living   1         SAB   1    IAB   0    Ectopic   0    Multiple   0    Live Births   1               Family History   Problem Relation Age of Onset    Diabetes Paternal Grandfather     Hearing loss Paternal Grandmother     Cancer Maternal Grandfather         Skin    Skin cancer Maternal Grandfather     Diabetes Maternal Grandfather     Heart disease Maternal Grandfather     Colon cancer Father     Cancer Father         Colon    Liver cancer Father     Hyperlipidemia Father     Hypertension Mother     Crohn's disease Brother     Endometrial cancer Maternal Aunt     Breast cancer Maternal Cousin 41    Crohn's disease Daughter     Ovarian cancer Neg Hx     Melanoma Neg Hx      Social History     Tobacco Use    Smoking status: Never    Smokeless tobacco: Never   Substance Use Topics    Alcohol use: Not Currently     Alcohol/week: 0.0 standard drinks of alcohol    Drug use: No       Current Outpatient Medications   Medication Sig    clonazePAM (KLONOPIN) 1 MG  tablet Take 1 tablet (1 mg total) by mouth 2 (two) times daily.    cyclobenzaprine (FLEXERIL) 10 MG tablet Take 1 tablet (10 mg total) by mouth every evening as needed for muscle pain    diphenoxylate-atropine 2.5-0.025 mg/5 ml (LOMOTIL) 2.5-0.025 mg/5 mL liquid Take 2.5-5 ml up to 4 times a day - dose might change at visit on 1/30    droNABinol (MARINOL) 5 MG capsule Take 1 capsule (5 mg total) by mouth 2 (two) times daily before meals.    estradiol 0.025 mg/24 hr 0.025 mg/24 hr Place 1 patch onto the skin once a week.    gabapentin (NEURONTIN) 300 MG capsule Take 1 capsule (300 mg total) by mouth 2 (two) times daily.    mirtazapine (REMERON SOL-TAB) 30 MG disintegrating tablet Take 1 tablet (30 mg total) by mouth nightly.    multivitamin (THERAGRAN) per tablet Take 1 tablet by mouth once daily.    nadoloL (CORGARD) 40 MG tablet Take 1 tablet (40 mg total) by mouth once daily. Patient needs appointment before next refill.    pantoprazole (PROTONIX) 40 MG tablet Take 1 tablet (40 mg total) by mouth 2 (two) times daily.    valACYclovir (VALTREX) 500 MG tablet Take 1 tablet (500 mg total) by mouth once daily.    vedolizumab (ENTYVIO) 300 mg SolR injection Inject 300 mg into the vein.    zolpidem (AMBIEN) 10 mg Tab Take 1 tablet (10 mg total) by mouth every evening.    tamsulosin (FLOMAX) 0.4 mg Cap Take 1 capsule (0.4 mg total) by mouth once daily.     Current Facility-Administered Medications   Medication    estradiol valerate (DELESTROGEN) injection 20 mg/mL       Review of Systems:  Review of Systems   Constitutional:  Negative for chills, fatigue and fever.   HENT:  Negative for congestion.    Eyes:  Negative for visual disturbance.   Respiratory:  Negative for cough and shortness of breath.    Cardiovascular:  Negative for chest pain and palpitations.   Gastrointestinal:  Negative for abdominal distention, abdominal pain, constipation, diarrhea, nausea and vomiting.   Genitourinary:  Negative for difficulty  urinating, dysuria, hematuria, vaginal bleeding and vaginal discharge.   Skin:  Negative for rash.   Neurological:  Negative for dizziness, seizures, light-headedness and headaches.   Hematological:  Does not bruise/bleed easily.   Psychiatric/Behavioral:  Negative for dysphoric mood. The patient is not nervous/anxious.         OBJECTIVE:     Physical Exam:  Vitals:    03/04/24 1029   BP: 110/80      Physical Exam  Vitals and nursing note reviewed. Exam conducted with a chaperone present.   Constitutional:       General: She is not in acute distress.     Appearance: She is well-developed.   HENT:      Head: Normocephalic and atraumatic.   Eyes:      Pupils: Pupils are equal, round, and reactive to light.   Cardiovascular:      Rate and Rhythm: Normal rate and regular rhythm.   Pulmonary:      Effort: Pulmonary effort is normal. No respiratory distress.   Abdominal:      General: There is no distension.      Palpations: Abdomen is soft. There is no mass.      Tenderness: There is no abdominal tenderness. There is no guarding.   Genitourinary:     Comments: SSE: Normal external female genitalia, normal urethral meatus, normal vaginal rugae, normal vaginal mucosa, no vaginal blood in canal, normal physiologic discharge, surgically absent cervix and uterus.  Musculoskeletal:         General: Normal range of motion.      Cervical back: Normal range of motion and neck supple.   Skin:     General: Skin is warm and dry.   Neurological:      Mental Status: She is alert and oriented to person, place, and time.   Psychiatric:         Behavior: Behavior normal.         Thought Content: Thought content normal.         Judgment: Judgment normal.         ASSESSMENT/PLAN:     1. Screen for STD (sexually transmitted disease)  - Discussed importance of safe sex practices  - HIV 1/2 Ag/Ab (4th Gen); Future  - RPR; Future  - Hepatitis Panel, Acute; Future  - C. trachomatis/N. gonorrhoeae by AMP DNA  - Vaginosis Screen by DNA  Probe      Tawanda Mahajan M.D.  OB/GYN  [facility]]

## 2024-03-05 LAB
C TRACH DNA SPEC QL NAA+PROBE: NOT DETECTED
N GONORRHOEA DNA SPEC QL NAA+PROBE: NOT DETECTED
RPR SER QL: NORMAL

## 2024-03-05 RX ORDER — CYCLOBENZAPRINE HCL 10 MG
10 TABLET ORAL NIGHTLY
Qty: 30 TABLET | Refills: 0 | Status: SHIPPED | OUTPATIENT
Start: 2024-03-05 | End: 2024-04-11 | Stop reason: SDUPTHER

## 2024-03-06 ENCOUNTER — CLINICAL SUPPORT (OUTPATIENT)
Dept: REHABILITATION | Facility: HOSPITAL | Age: 44
End: 2024-03-06
Payer: COMMERCIAL

## 2024-03-06 DIAGNOSIS — M25.612 DECREASED RANGE OF MOTION OF LEFT SHOULDER: Primary | ICD-10-CM

## 2024-03-06 LAB
BACTERIAL VAGINOSIS DNA: NEGATIVE
CANDIDA GLABRATA DNA: NEGATIVE
CANDIDA KRUSEI DNA: NEGATIVE
CANDIDA RRNA VAG QL PROBE: POSITIVE
T VAGINALIS RRNA GENITAL QL PROBE: NEGATIVE

## 2024-03-06 PROCEDURE — 97140 MANUAL THERAPY 1/> REGIONS: CPT | Mod: CQ

## 2024-03-06 PROCEDURE — 97530 THERAPEUTIC ACTIVITIES: CPT | Mod: CQ

## 2024-03-06 PROCEDURE — 97112 NEUROMUSCULAR REEDUCATION: CPT | Mod: CQ

## 2024-03-06 NOTE — PROGRESS NOTES
[facility]   Physical Therapy Treatment Note     Name: Ivy Salgado  Clinic Number: [id number]    Therapy Diagnosis:   Encounter Diagnosis   Name Primary?    Decreased range of motion of left shoulder Yes       Physician: Mono Giles III, *    Visit Date: 3/6/2024     Evaluation Date: 8/8/2023  Authorization Period Expiration: 7/6/2024  Plan of Care Expiration: 12/31/2023  Progress Note Due: 9/10/2023  Visit # / Visits authorized: 10/ 20  Foto: 3/3     Time In: 1600  Time Out: 1700  Total Billable Time: 60 minutes (PT extender used )    DOS: 7/18/2023  S/p: left  1. Total shoulder arthroplasty (complex, -22 modifier)  2. Shoulder lysis of adhesions  3. Shoulder subpectoral biceps tenodesis (CPT 08350)  Post-Op Precautions: We will follow the shoulder arthroplasty guidelines. The patient remained in a sling for 6 weeks. We will start PT at the 3-week martina.       Precautions: none    SUBJECTIVE     Pt reports: feel a little stiff. Going to gym    She was compliant with home exercise program.  Response to previous treatment: improvement in pain at rest   Functional change: able to ttol reaching overhead a bit better    Pain: 3/10  Location: left shoulder      OBJECTIVE       Shoulder Active Range of Motion:   Shoulder Right Left   Flexion   150 110 pre- post    ER at 0   40 30      Shoulder Passive Range of Motion:   Shoulder Right Left   Flexion   160 150   ER at 0   60 40       Strength:   Right Left   Scaption 5/5 3+/5   Shoulder ER at side 5/5 3+/5   Shoulder IR at side  5/5 3+/5            Treatment     Ivy received the treatments listed below:        Manual therapy techniques: Joint mobilizations and Soft tissue Mobilization were applied to the: L shoulder for 10 minutes, including:  Thoracic manip -np  Scap mobs   STM to Subscap   Prone T/S mobs    Neuromuscular re-education activities to improve: motor control for 20 min  Hand heel rocks 30x   Wand flexion 4x8 3#      Therapeutic activities to improve functional performance for 30  minutes, including:  Thoracic rotations 15x B   Face Pulls 3x15 otb   Resisted IR otb 3x15   Resisted ER YTB 5x5      Patient Education and Home Exercises     Home Exercises Provided and Patient Education Provided     Education provided:   - Add new exercises     Written Home Exercises Provided: yes. Exercises were reviewed and Ivy was able to demonstrate them prior to the end of the session.  Ivy demonstrated good  understanding of the education provided. See EMR under Patient Instructions for exercises provided during therapy sessions    ASSESSMENT   Ivy is showing improvements in AROM FL. Added ER strengthening in today with low reps as she fatigues fast. Limited mobility mid thoracic spine.    Ivy Is progressing well towards her goals.     Pt prognosis is Fair.     Pt will continue to benefit from skilled outpatient physical therapy to address the deficits listed in the problem list box on initial evaluation, provide pt/family education and to maximize pt's level of independence in the home and community environment.     Pt's spiritual, cultural and educational needs considered and pt agreeable to plan of care and goals.     Anticipated barriers to physical therapy: none     Goals:   Short Term Goals: 12 weeks  1. Pt will be compliant with HEP 50% of prescribed amount. met  2. The pt to demo improvement in R shoulder PROM to by 80% of the L met  3.  The pt to demo tolerance to being out of the sling for 24 hours with pain <2/10 met     Long Term Goals:  36 weeks   Pt will be compliant with % of prescribed amount.  met  The pt to improvement in AROM of L shoulder within 80% of R shoulder to improve tolerance to OH movement  met  The pt to  Demo at least 4+/5 of RTC muscle testing to demo improvement in tolerance to activity progressing, not met  The pt to tolerate lifting 50# from the ground to waist height per job requirement  description progressing, not met  The pt will report full participation in ADLs and IADLs without restrictions related to L Shoulder.  progressing, not met    PLAN     Follow TSA procedure     Vidya Lemus PTA,

## 2024-03-07 ENCOUNTER — TELEPHONE (OUTPATIENT)
Dept: GASTROENTEROLOGY | Facility: CLINIC | Age: 44
End: 2024-03-07
Payer: COMMERCIAL

## 2024-03-07 NOTE — TELEPHONE ENCOUNTER
Received Short Term Disability paperwork. Last noted on 2/1/24 pt was going to have PCP fill out form d/t shoulder restrictions at appt on 2/2/24.

## 2024-03-08 ENCOUNTER — PATIENT MESSAGE (OUTPATIENT)
Dept: OBSTETRICS AND GYNECOLOGY | Facility: CLINIC | Age: 44
End: 2024-03-08
Payer: COMMERCIAL

## 2024-03-08 RX ORDER — TERCONAZOLE 4 MG/G
1 CREAM VAGINAL NIGHTLY
Qty: 45 G | Refills: 0 | Status: SHIPPED | OUTPATIENT
Start: 2024-03-08 | End: 2024-03-21

## 2024-03-13 ENCOUNTER — PATIENT MESSAGE (OUTPATIENT)
Dept: UROLOGY | Facility: CLINIC | Age: 44
End: 2024-03-13
Payer: COMMERCIAL

## 2024-03-13 ENCOUNTER — PATIENT MESSAGE (OUTPATIENT)
Dept: PSYCHIATRY | Facility: CLINIC | Age: 44
End: 2024-03-13
Payer: COMMERCIAL

## 2024-03-13 DIAGNOSIS — N39.0 FREQUENT UTI: Primary | ICD-10-CM

## 2024-03-13 RX ORDER — DIPHENOXYLATE HYDROCHLORIDE AND ATROPINE SULFATE 2.5; .025 MG/5ML; MG/5ML
SOLUTION ORAL
Qty: 300 ML | Refills: 0 | Status: SHIPPED | OUTPATIENT
Start: 2024-03-13

## 2024-03-14 ENCOUNTER — CLINICAL SUPPORT (OUTPATIENT)
Dept: REHABILITATION | Facility: HOSPITAL | Age: 44
End: 2024-03-14
Payer: COMMERCIAL

## 2024-03-14 ENCOUNTER — PATIENT MESSAGE (OUTPATIENT)
Dept: GASTROENTEROLOGY | Facility: CLINIC | Age: 44
End: 2024-03-14
Payer: COMMERCIAL

## 2024-03-14 ENCOUNTER — LAB VISIT (OUTPATIENT)
Dept: LAB | Facility: HOSPITAL | Age: 44
End: 2024-03-14
Payer: COMMERCIAL

## 2024-03-14 DIAGNOSIS — N39.0 FREQUENT UTI: ICD-10-CM

## 2024-03-14 DIAGNOSIS — R10.9 FLANK PAIN: ICD-10-CM

## 2024-03-14 DIAGNOSIS — M25.612 DECREASED RANGE OF MOTION OF LEFT SHOULDER: Primary | ICD-10-CM

## 2024-03-14 DIAGNOSIS — R39.9 UTI SYMPTOMS: Primary | ICD-10-CM

## 2024-03-14 PROCEDURE — 97140 MANUAL THERAPY 1/> REGIONS: CPT

## 2024-03-14 PROCEDURE — 87086 URINE CULTURE/COLONY COUNT: CPT | Performed by: NURSE PRACTITIONER

## 2024-03-14 PROCEDURE — 87077 CULTURE AEROBIC IDENTIFY: CPT | Performed by: NURSE PRACTITIONER

## 2024-03-14 PROCEDURE — 87186 SC STD MICRODIL/AGAR DIL: CPT | Performed by: NURSE PRACTITIONER

## 2024-03-14 PROCEDURE — 97530 THERAPEUTIC ACTIVITIES: CPT

## 2024-03-14 PROCEDURE — 87088 URINE BACTERIA CULTURE: CPT | Performed by: NURSE PRACTITIONER

## 2024-03-14 PROCEDURE — 97112 NEUROMUSCULAR REEDUCATION: CPT

## 2024-03-14 RX ORDER — GABAPENTIN 300 MG/1
300 CAPSULE ORAL 2 TIMES DAILY
Qty: 60 CAPSULE | Refills: 11 | OUTPATIENT
Start: 2024-03-14

## 2024-03-14 RX ORDER — CEPHALEXIN 500 MG/1
500 CAPSULE ORAL EVERY 12 HOURS
Qty: 14 CAPSULE | Refills: 0 | Status: SHIPPED | OUTPATIENT
Start: 2024-03-14 | End: 2024-03-21

## 2024-03-14 NOTE — PROGRESS NOTES
[facility]   Physical Therapy Treatment Note     Name: Ivy Salgado  Clinic Number: [id number]    Therapy Diagnosis:   Encounter Diagnosis   Name Primary?    Decreased range of motion of left shoulder Yes       Physician: Mono Giles III, *    Visit Date: 3/14/2024     Evaluation Date: 8/8/2023  Authorization Period Expiration: 7/6/2024  Plan of Care Expiration: 12/31/2023  Progress Note Due: 9/10/2023  Visit # / Visits authorized: 11/ 20  Foto: 3/3     Time In: 1500  Time Out: 1600  Total Billable Time: 60 minutes (PT extender used )    DOS: 7/18/2023  S/p: left  1. Total shoulder arthroplasty (complex, -22 modifier)  2. Shoulder lysis of adhesions  3. Shoulder subpectoral biceps tenodesis (CPT 68760)  Post-Op Precautions: We will follow the shoulder arthroplasty guidelines. The patient remained in a sling for 6 weeks. We will start PT at the 3-week martina.       Precautions: none    SUBJECTIVE     Pt reports:she has been feeling under the weather so her shoulder feels a little stiff     She was compliant with home exercise program.  Response to previous treatment: improvement in pain at rest   Functional change: able to ttol reaching overhead a bit better    Pain: 3/10  Location: left shoulder      OBJECTIVE       Shoulder Active Range of Motion:   Shoulder Right Left   Flexion   150 120 pre- post    ER at 0   40 30      Shoulder Passive Range of Motion:   Shoulder Right Left   Flexion   160 150   ER at 0   60 40       Strength:   Right Left   Scaption 5/5 3+/5   Shoulder ER at side 5/5 3+/5   Shoulder IR at side  5/5 3+/5            Treatment     Ivy received the treatments listed below:        Manual therapy techniques: Joint mobilizations and Soft tissue Mobilization were applied to the: L shoulder for 08 minutes, including:  Thoracic manip -np  Scap mobs   STM to Subscap   Prone T/S mobs    Neuromuscular re-education activities to improve: motor control for 10  min  Hand heel rocks 30x   Walk outs ER otb with ER moment initally per rep 3x10    Therapeutic activities to improve functional performance for 30  minutes, including:  Thoracic rotations 15x B   Ball up wall rolls 4# 3x; 6# 10x; 3x10 6# *attempted 8# unable    carry 2# mod <-> 3x   Bicep curls 4# 3x6         Patient Education and Home Exercises     Home Exercises Provided and Patient Education Provided     Education provided:   - Add new exercises     Written Home Exercises Provided: yes. Exercises were reviewed and Ivy was able to demonstrate them prior to the end of the session.  Ivy demonstrated good  understanding of the education provided. See EMR under Patient Instructions for exercises provided during therapy sessions    ASSESSMENT   The pt tolerated new therex well, improvement in josefa to ER strengthening. Cont limited thoracic mobility upon arrival that improves with manual.     Ivy Is progressing well towards her goals.     Pt prognosis is Fair.     Pt will continue to benefit from skilled outpatient physical therapy to address the deficits listed in the problem list box on initial evaluation, provide pt/family education and to maximize pt's level of independence in the home and community environment.     Pt's spiritual, cultural and educational needs considered and pt agreeable to plan of care and goals.     Anticipated barriers to physical therapy: none     Goals:   Short Term Goals: 12 weeks  1. Pt will be compliant with HEP 50% of prescribed amount. met  2. The pt to demo improvement in R shoulder PROM to by 80% of the L met  3.  The pt to demo tolerance to being out of the sling for 24 hours with pain <2/10 met     Long Term Goals:  36 weeks   Pt will be compliant with % of prescribed amount.  met  The pt to improvement in AROM of L shoulder within 80% of R shoulder to improve tolerance to OH movement  met  The pt to  Demo at least 4+/5 of RTC muscle testing to demo improvement  in tolerance to activity progressing, not met  The pt to tolerate lifting 50# from the ground to waist height per job requirement description progressing, not met  The pt will report full participation in ADLs and IADLs without restrictions related to L Shoulder.  progressing, not met    PLAN     Follow TSA procedure     Dorothy Basurto, PT,

## 2024-03-15 RX ORDER — TAMSULOSIN HYDROCHLORIDE 0.4 MG/1
0.4 CAPSULE ORAL DAILY
Qty: 30 CAPSULE | Refills: 1 | Status: SHIPPED | OUTPATIENT
Start: 2024-03-15 | End: 2024-05-14

## 2024-03-15 NOTE — TELEPHONE ENCOUNTER
No care due was identified.  [facility] Embedded Care Due Messages. Reference number: [id number].   3/14/2024 10:06:21 PM CDT

## 2024-03-16 LAB — BACTERIA UR CULT: ABNORMAL

## 2024-03-19 ENCOUNTER — CLINICAL SUPPORT (OUTPATIENT)
Dept: REHABILITATION | Facility: HOSPITAL | Age: 44
End: 2024-03-19
Payer: COMMERCIAL

## 2024-03-19 DIAGNOSIS — M25.612 DECREASED RANGE OF MOTION OF LEFT SHOULDER: Primary | ICD-10-CM

## 2024-03-19 PROCEDURE — 97140 MANUAL THERAPY 1/> REGIONS: CPT

## 2024-03-19 PROCEDURE — 97112 NEUROMUSCULAR REEDUCATION: CPT

## 2024-03-19 PROCEDURE — 97530 THERAPEUTIC ACTIVITIES: CPT

## 2024-03-19 NOTE — TELEPHONE ENCOUNTER
----- Message from Shelby Tamayo sent at 5/14/2018 12:00 PM CDT -----  Contact: PHANI RODRIGUEZ [[id number]]            Name of Who is Calling: PHANI RODRIGUEZ [[id number]]    What is the request in detail: Returning a call. please call patient to schedule appt. Thanks     Can the clinic reply by [facility]: no    What Number to Call Back [facility] not [facility]: [phone]                                 [facility]  Hospitalist Progress Note  Sedrick Perez MD 03/19/2024    Reason for Stay (Diagnosis): sacral ulcer         Assessment and Plan:      Summary of Stay: Feng Wynne is a 80 year old male with history of mitral valve replacement with titanium valve, chronic anticoagulation with warfarin, hypertension, hypercholesterolemia, coronary artery disease, atrial fibrillation, congestive heart failure, and obesity with BMI of 35.  He was admitted to [facility] from 1/9/2024 through 1/19/2024 with severe anemia and hemoglobin of 4.4.  He discharged to transitional care.  He was readmitted to [facility] from 1/30/2024 through 2/15/2024 with recurrent anemia.  He was found to have severe pill esophagitis.  At the time of that admission he was noted to have a sacral decubitus ulcer.  He discharged to transitional care.  He has been receiving wound care for his decubitus ulcer since his second hospitalization at [facility].  Wound care continued at transitional [facility].  He was sent to the ED today with concern of sacral decubitus ulcer wound infection.  The wound care nurse had noticed some drainage with foul odor.  Evaluation showed hemoglobin 9.6, white blood cell 6.6, platelets 217, BUN 30.7, creatinine 1.07, sed rate greater than 140, and .65.  Exam showed deep appearing sacral decubitus ulcer with malodorous drainage and surrounding erythema.  He denied fevers, chills, chest pain, cough, dysuria, and diarrhea.  He has had pain in sacral decubitus ulcer with sitting or any pressure.  80 year old male with history of mitral valve replacement with titanium valve, chronic anticoagulation with warfarin, hypertension, hypercholesterolemia, coronary artery disease, atrial fibrillation, congestive heart failure, and obesity with BMI of 35.  He was admitted to [facility] from 1/9/2024 through 1/19/2024 with severe anemia and  hemoglobin of 4.4.  He discharged to transitional care.  He was readmitted to [facility] from 1/30/2024 through 2/15/2024 with recurrent anemia.  He was found to have severe pill esophagitis.  At the time of that admission he was noted to have a sacral decubitus ulcer.  He discharged to transitional care.  He has been receiving wound care for his decubitus ulcer since his second hospitalization at [facility].  Wound care continued at transitional care.  He was sent to the ED today with concern of sacral decubitus ulcer wound infection.  The wound care nurse had noticed some drainage with foul odor.  Evaluation showed hemoglobin 9.6, white blood cell 6.6, platelets 217, BUN 30.7, creatinine 1.07, sed rate greater than 140, and .65.  Exam showed deep appearing sacral decubitus ulcer with malodorous drainage and surrounding erythema.  He denied fevers, chills, chest pain, cough, dysuria, and diarrhea.  He has had pain in sacral decubitus ulcer with sitting or any pressure.      Since admission, the patient been treated with IV vancomycin and IV cefepime.  He was evaluated by general surgery who do not feel debridement required at this point, with pelvic MRI ordered to rule out deeper infection    Plans today:  -Continue IV vancomycin/IV cefepime  -Pelvic MRI ordered to rule out deep infection  -ID consulted to assist with antibiotic management  -WOC consult to assist with treatment of sacral wound  - Patient's partner Jose updated at bedside today     Problem list:     Sacral decubitus ulcer  -Continue IV vancomycin/IV cefepime  -MRI of pelvis to rule out deep infection  -General surgery consult appreciated.  Holding off on debridement for now  -Consult infectious disease  -Wound care nurse consult  -As needed Tylenol, oxycodone, and IV Dilaudid for pain  -Wound culture done on admission shows gram-negative bacilli and Enterococcus faecalis, though this is likely colonized  "wound adrián      Prosthetic mitral valve  Chronic anticoagulation with warfarin  -Warfarin per pharmacy     Hypertension  Hypercholesterolemia  Coronary artery disease  History of congestive heart failure  Atrial fibrillation  -Resume prior to admission torsemide, potassium chloride, and warfarin     Recent hospitalizations for anemia due to severe pill esophagitis  -Continue prior to admission Protonix and Carafate        Diet: Combination Diet Regular Diet Adult    DVT Prophylaxis: Warfarin  Stubbs Catheter: Not present  Lines: None     Cardiac Monitoring: None  Code Status: Full Code            Interval History (Subjective):      Patient has no current complaints.  Await pelvic MRI to rule out deep infection.  Reviewed general surgery note from today, appreciate Dr. Peterson's input.  No plans for debridement currently.  Remains on IV antibiotics                  Physical Exam:      Last Vital Signs:  /58 (BP Location: Right arm)   Pulse 85   Temp 99  F (37.2  C) (Oral)   Resp 18   Ht 1.803 m (5' 11\")   Wt 112 kg (247 lb)   SpO2 98%   BMI 34.45 kg/m        Intake/Output Summary (Last 24 hours) at 3/19/2024 1516  Last data filed at 3/19/2024 1048  Gross per 24 hour   Intake --   Output 400 ml   Net -400 ml       Constitutional: Awake, alert, cooperative, no apparent distress     Respiratory: Clear to auscultation bilaterally, no crackles or wheezing   Cardiovascular: Regular rate and rhythm, normal S1 and S2, and no murmur noted   Abdomen: Normal bowel sounds, soft, non-distended, non-tender   Skin: No rashes, no cyanosis, dry to touch   Neuro: Alert and oriented x3, no weakness, numbness, memory loss   Extremities: No edema, normal range of motion   Other(s): Patient's partner Jose at bedside and updated       All other systems: Negative          Medications:      All current medications were reviewed with changes reflected in problem list.         Data:      All new lab and imaging data was " reviewed.   Labs:  Recent Labs   Lab 03/19/24  0759   WBC 8.3   HGB 9.3*   HCT 29.3*   MCV 99         Imaging:   No results found for this or any previous visit (from the past 24 hour(s)).

## 2024-03-19 NOTE — PROGRESS NOTES
[facility]   Physical Therapy Treatment Note     Name: Ivy Salgado  Clinic Number: [id number]    Therapy Diagnosis:   Encounter Diagnosis   Name Primary?    Decreased range of motion of left shoulder Yes       Physician: Mono Giles III, *    Visit Date: 3/19/2024     Evaluation Date: 8/8/2023  Authorization Period Expiration: 7/6/2024  Plan of Care Expiration: 12/31/2023  Progress Note Due: 9/10/2023  Visit # / Visits authorized: 11/ 20  Foto: 3/3     Time In: 1500  Time Out: 1600  Total Billable Time: 49 minutes (PT extender used )    DOS: 7/18/2023  S/p: left  1. Total shoulder arthroplasty (complex, -22 modifier)  2. Shoulder lysis of adhesions  3. Shoulder subpectoral biceps tenodesis (CPT 31200)  Post-Op Precautions: We will follow the shoulder arthroplasty guidelines. The patient remained in a sling for 6 weeks. We will start PT at the 3-week martina.       Precautions: none    SUBJECTIVE     Pt reports:shoulder feels stiff again, cont to feel weak.     She was compliant with home exercise program.  Response to previous treatment: improvement in pain at rest   Functional change: able to ttol reaching overhead a bit better    Pain: 3/10  Location: left shoulder      OBJECTIVE       Shoulder Active Range of Motion:   Shoulder Right Left   Flexion   150 120 pre- post    ER at 0   40 30      Shoulder Passive Range of Motion:   Shoulder Right Left   Flexion   160 150   ER at 0   60 40       Strength:   Right Left   Scaption 5/5 3+/5   Shoulder ER at side 5/5 3+/5   Shoulder IR at side  5/5 3+/5            Treatment     Ivy received the treatments listed below:        Manual therapy techniques: Joint mobilizations and Soft tissue Mobilization were applied to the: L shoulder for 08 minutes, including:  Thoracic manip -np  Scap mobs   STM to Subscap   Prone T/S mobs    Neuromuscular re-education activities to improve: motor control for 18 min  Hand heel rocks 30x   Walk outs  ER otb with ER moment initally per rep 3x10  Ant deltoid raises (mirro) 3x10 1#   Lateral deltoid raises (elbow bent, mirror) 2# 3x10     Therapeutic activities to improve functional performance for 23  minutes, including:  Thoracic rotations 15x B   Campbell Carry <-> 4x 7# DB (holding away from body slightly)  Resisted IR otb 3x15         Patient Education and Home Exercises     Home Exercises Provided and Patient Education Provided     Education provided:   - Add new exercises     Written Home Exercises Provided: yes. Exercises were reviewed and Ivy was able to demonstrate them prior to the end of the session.  Ivy demonstrated good  understanding of the education provided. See EMR under Patient Instructions for exercises provided during therapy sessions    ASSESSMENT     The patient was able to complete exercises well. Most improvement in shoulder stiffness reported following thoracic manip. Initially focused on control of thoracic rotation and extension to improve tolerance to shoulder activation during exercises today.     Ivy Is progressing well towards her goals.     Pt prognosis is Fair.     Pt will continue to benefit from skilled outpatient physical therapy to address the deficits listed in the problem list box on initial evaluation, provide pt/family education and to maximize pt's level of independence in the home and community environment.     Pt's spiritual, cultural and educational needs considered and pt agreeable to plan of care and goals.     Anticipated barriers to physical therapy: none     Goals:   Short Term Goals: 12 weeks  1. Pt will be compliant with HEP 50% of prescribed amount. met  2. The pt to demo improvement in R shoulder PROM to by 80% of the L met  3.  The pt to demo tolerance to being out of the sling for 24 hours with pain <2/10 met     Long Term Goals:  36 weeks   Pt will be compliant with % of prescribed amount.  met  The pt to improvement in AROM of L shoulder  within 80% of R shoulder to improve tolerance to OH movement  met  The pt to  Demo at least 4+/5 of RTC muscle testing to demo improvement in tolerance to activity progressing, not met  The pt to tolerate lifting 50# from the ground to waist height per job requirement description progressing, not met  The pt will report full participation in ADLs and IADLs without restrictions related to L Shoulder.  progressing, not met    PLAN     Follow TSA procedure     Dorothy Basurto, PT,

## 2024-03-20 RX ORDER — GABAPENTIN 300 MG/1
300 CAPSULE ORAL 2 TIMES DAILY
Qty: 60 CAPSULE | Refills: 5 | Status: SHIPPED | OUTPATIENT
Start: 2024-03-20

## 2024-03-21 ENCOUNTER — OFFICE VISIT (OUTPATIENT)
Dept: GASTROENTEROLOGY | Facility: CLINIC | Age: 44
End: 2024-03-21
Payer: COMMERCIAL

## 2024-03-21 ENCOUNTER — CLINICAL SUPPORT (OUTPATIENT)
Dept: REHABILITATION | Facility: HOSPITAL | Age: 44
End: 2024-03-21
Payer: COMMERCIAL

## 2024-03-21 VITALS
SYSTOLIC BLOOD PRESSURE: 113 MMHG | HEIGHT: 61 IN | TEMPERATURE: 98 F | WEIGHT: 121.69 LBS | HEART RATE: 72 BPM | DIASTOLIC BLOOD PRESSURE: 77 MMHG | BODY MASS INDEX: 22.98 KG/M2

## 2024-03-21 DIAGNOSIS — K50.018 CROHN'S DISEASE OF SMALL INTESTINE WITH OTHER COMPLICATION: Primary | ICD-10-CM

## 2024-03-21 DIAGNOSIS — M25.612 DECREASED RANGE OF MOTION OF LEFT SHOULDER: Primary | ICD-10-CM

## 2024-03-21 PROCEDURE — 3078F DIAST BP <80 MM HG: CPT | Mod: CPTII,S$GLB,, | Performed by: INTERNAL MEDICINE

## 2024-03-21 PROCEDURE — 99215 OFFICE O/P EST HI 40 MIN: CPT | Mod: S$GLB,,, | Performed by: INTERNAL MEDICINE

## 2024-03-21 PROCEDURE — 1160F RVW MEDS BY RX/DR IN RCRD: CPT | Mod: CPTII,S$GLB,, | Performed by: INTERNAL MEDICINE

## 2024-03-21 PROCEDURE — 97112 NEUROMUSCULAR REEDUCATION: CPT | Mod: CQ

## 2024-03-21 PROCEDURE — 3074F SYST BP LT 130 MM HG: CPT | Mod: CPTII,S$GLB,, | Performed by: INTERNAL MEDICINE

## 2024-03-21 PROCEDURE — 1159F MED LIST DOCD IN RCRD: CPT | Mod: CPTII,S$GLB,, | Performed by: INTERNAL MEDICINE

## 2024-03-21 PROCEDURE — 3008F BODY MASS INDEX DOCD: CPT | Mod: CPTII,S$GLB,, | Performed by: INTERNAL MEDICINE

## 2024-03-21 PROCEDURE — 97530 THERAPEUTIC ACTIVITIES: CPT | Mod: CQ

## 2024-03-21 PROCEDURE — 3044F HG A1C LEVEL LT 7.0%: CPT | Mod: CPTII,S$GLB,, | Performed by: INTERNAL MEDICINE

## 2024-03-21 PROCEDURE — G2211 COMPLEX E/M VISIT ADD ON: HCPCS | Mod: S$GLB,,, | Performed by: INTERNAL MEDICINE

## 2024-03-21 PROCEDURE — 97140 MANUAL THERAPY 1/> REGIONS: CPT | Mod: CQ

## 2024-03-21 RX ORDER — LOPERAMIDE HCL 1MG/7.5ML
4 LIQUID (ML) ORAL 3 TIMES DAILY PRN
COMMUNITY

## 2024-03-21 NOTE — PATIENT INSTRUCTIONS
- let me know when or if you have another UTI and also see if macrobid prophylaxis is something   - continue entyvio every 8 weeks  - ileoscopy through stoma- clear liquids day before end of 7/2024  - labs 7/2024

## 2024-03-21 NOTE — PROGRESS NOTES
[facility]             Inflammatory Bowel Disease   Follow-up  Note              TODAY'S VISIT DATE:  3/23/2024    Chief Complaint:   Chief Complaint   Patient presents with    Crohn's Disease     PCP: Luis Madden    Previous History:  Ivy Salgado is a 43 y.o. female with Crohn's disease with end ileostomy and recurrent ileitis, recurrent C diff (2014 x 2), ARPITA/depression, arthritis, h/o abnormal pap smear- LYLA I, nephrolithiasis-nonobstructive, GERD, long QTc syndrome (Type III, on nadolol), s/p SHELLIE (2015) for recurrent ovarian cysts and endometriosis, early evolving melanoma in situ (buttocks and para-stomal site, excised in 2018 and 2019 respectively), history of genital HSV, imuran induced pancreatitis, pancreatic tail cyst, recurrent UTI (h/o ESBL 2018), multiple thyroid nodules, osteopenia, left femoral head chronic avascular necrosis, history bacterial vaginosis, h/o abnormal LFTs (elevated ALP since 2016), family history of colon cancer.  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  She tried multiple meds including imuran (pancreatitis, remicade (secondary nonresponder), humira (DIL), MTX (leukopenia).  She met Dr. Cameron initially in GI clinic at [facility] 5/2009 at which time she had some diarrhea and on pred 30 mg/d. Colonoscopy 6/2009 significant for diffuse proctosigmoiditis with remainder of colon normal and end to end ileocolonic anastomsis with inflammation. Placed on rowasa enemas but too expensive so switched to steroid enemas.  Flex sig 10/2009 ongoing proctosigmoiditis.  In 2010 she had rectal bleeding that improved with proctofoam and started cimzia with improvement of symptoms.  In 4/2010 she had CT showing circumferential bowel wall thickening of the distal descending and sigmoid with small caliber origin of celiac artery  and referred to vascular for median arcuate ligament syndrome.  Colonoscopy c/w moderate proctosigmoiditis and in 5/2010 she continued proctofoam BID and took extra dose of cimzia to induce remission.  In 7/2010 flex sig confirmed active left sided colitis and she started prednisone in 3/2011 with repeat colonoscopy 8/2011 c/w ongoing rectosigmoid inflammation with mild mucosal ulcer the transverse colon with ileocolonic anastomotic stricture and normal TI. In 9/2011 pt was on canasa, cimzia and prednisone taper.  In April/May 2012 pt hospitalized and CT c/w sigmoid wall thickening and colonoscopy confirmed distal 30 cm with moderate inflammation with ileocolonic anastomotic ulcers and normal TI.  In 6/2012 Dr Parsons proceeded with completion proctocolectomy with end ileostomy with pathology confirming transmural active inflammation with abscess c/w Crohn's disease.  Post-operatively she had peristomal ulcer and perineal wounds. In 1/2013 patient had non-healing perineal wound S/P debridement  with steroids and treated with cipro. In 8/2013 she had EUA with debridement and fulguration of non-healing perineal wound.  In 8/2013 pt had epigastric abd pain and EGD c/w benign gastric polyp, labs normal, CT c/w short segment WB thickening involving ileum proximal to the stoma and resolution of right adnexal mass.  In 9/2013 ileoscopy through stoma significant for segment mucosa at 5 cm proximal to stoma mild congested, eroded, ulcerated area of 6-30 cm and mucosal distal is normal. CT A/p 1/2024 c/w 2 separate segments of SB proximal to the ostomy and near staple row in RLQ with mild enhancement and wall thickening c/w mild inflammatory changes and segment of bowel forming ostomy.  SB enteroscopy 2/2014 significant for single 2 mm ulcer in the proximal ileum.  In 2014 she had 2 hospitalizations for high ileostomy output with was treated with prednisone with taper.  In 10/2014 CT showed few mild dilated loops SB in  anterior right pelvis and loop with surgical suture line abuts anterior pelvic wall and possible adhesion. In 10/2014 ileoscopy through stomal normal.  In 1/2015 pt had focal segment of mild distal SB dilation and C diff positive and pt treated antibiotics followed by probiotics and resumed cimzia. Dr. Cameron then referred patient to Dr. Parish Ross in 2015 and he mentions recurrent C diff, recurrent SBOs likely related to adhesions. In 2015 she had SHELLIE/BSO with bladder repair and due to this missed one dose of cimzia and then resumed 5/2015. In 1/2016 she had partial SBO due to adhesions and UGI/SBFT and CT did not show fixed obstruction. In 10/2016 she reported to Dr. Ross postprandial nausea and epigastric pain, weight loss, fatigue, low grade fever and watery stool output and prednisone helped symptoms but not as good response as past. She was off of cimzia 8-9/2016 though sx occurred prior to holding cimzia. Ileoscopy through stoma 10/2016 significant for normal 15 cm of ileum examined. Overall Dr. Ross felt that her symptoms responded well to prednisone and restarting cimzia. She presented to her OV 1/2017 with increased ileostomy output with C diff negative with symptoms ongoing and MRE 5/2017 c/w long segment of diffuse wall thickening and mucosal enhancement involving the distal ileum. CT A/P 6/2017 significant for left ovarian cyst, mild biliary dilation stable, hypodensities and punctate bilateral renal calcifications.  Ileoscopy through stoma 8/2017 c/w normal ileum from stoma to 15-20 cm. CT A/P 11/2017 significant for righ adnexal mass abutting theright external iliac vein and pelvic US recommended, bilateral renal hypodensities and calcifications. MRE 11/2017 c/w mild questionable ileal inflammation and luminal narrowing involving short segment of SB within the right hemipelvis with mild dilation and upstream bowel 2.5 cm with findings concerning for developing stricture. She stopped cimzia 6/2017  and started stelara 7/11/2017 though discontinued in 1/2018 due to rash.  Due to symptoms pt was started in 1/2018 on prednisone 10 mg/d, bentyl. In 2/2018 CT A/P c/w bilateral nonobstructing renal calculi, mild bilateral cortical scarring of lower renal poles. MRE 9/2018 significant for short segment of distal ileum with scattered regions of non uniform wall thickening and possible additional short segment of proximal jejunum with mild wall thickening. In fall 2018 she was placed on entocort though due to fast transit was opening capsule and taking this. CT A/P 12/2018 c/w several bilateral renal hypodensities c/w cysts, nonobstructive bilateral nephrolithiasis, right adnexal complex cyst. She started entyvio 11/20/18 and due to recurrent UTIs and palpitations (dx QT prolongation and started on beta blocker) so discontinued in 10/27/20. MRE 5/15/19 significant for right adnexal cystic lesion, large left adnexal cystic lesion.  On 5/30/19 patient had exploratory laparotomy with lysis of adhesions, BSO/mass resection. CT A/P 6/2019 significant for nonspecific rim enhancing fluid collection, mild left hydroureteronephrosis. In 2019 there were several mos she held entyvio due to gyn and melanoma surgery. MRE 5/2020 significant for small non enhancing fluid collection within presacral region superior to the vaginal cuff, right sided pelvocaliectasis, bilateral cortical renal cysts, left femoral head chronic avascular necrosis. CT A/P 9/2020 c/w mild left hydrouriteronephrosis due to distal left ureter stone with ass urothelial thickening and enhancement.  MRE 4/2021 right ovarian cyst likely hemorrhagic cyst.  In 1/2022 patient was placed by Dr. Ross on pantoprazole 40 mg BID. For joint pains and chronic abdominal pain Dr. Ross treated her with gabapentin for several years. In 1/2023 she had multiple intrarenal stones.  Repeat CT 10/2023 small non-obstructing renal stones. She was hospitalized 12/31/23-1/7/24 for high  ileostomy output with stool studies neg for infection.  Elevated inflammatory markers (ESR 94, CRP 22.8), stool caprotectin 281. CT A/P showed slow flow through few mid to distal SB loops noting venous vascular engorgement about these loops and wall hyperemia. On 1/3/24 she had EGD c/w mild HP neg gastritis and ileoscopy through stoma with about 6 apthous ulcers in the distal 20 cm of the ileum and biopsies c/w active inflammation.  Pt had elevated LFTs (peaked 1/4/24 ///TB normal).  Hepatology consulted and serological workup negative and subsequent LFTs normalized with no intervention. Abdominal US revealed mildly dilated bile ducts in the central right hepatic lobe and MRCP c/w no evidence of biliary obstruction, punctate cystic focus in the pancreas tail likely side IPMN and f/u in 1 year recommended.  She was treated with antidiarrheals (imodium helped but lomotil caused palpitations) and IVFs. Lotronex was being considered but due to prolonged QT unable to proceed with this. Due to mild ileitis plan for outpatient entyvio.  Since her discharge from hospital she had continued high ileostomy output and dehydration and we recommended that she return for hospitalization but pt did not wish to go to ER.  She continued with high ileostomy output up to 8 liters/day but if fasting down to 4 liters/day despiate taking imodium 1 tab QID.  She is not taking lomotil due to palpitations.  At her OV 1/16/24 due to high ileostomy output and dehydration I proceeded with hospital admission 1/16/24-1/27/24 at which time she continued on IVFs, antidiarrheals, pain meds and started on IV solumedrol with repeat stool calprotectin done on 1/16/24 93.9 though unclear accuracy given some granulation tissue on stoma could influence this test. On 1/24/24 stool calprotectin 117.6, ileoscopy through stoma with one small apthous ulcer 19 cm proximal to stoma. She had stool studies neg for infection and then discharged  home on liquid imodium, pred 40 mg with taper and plan to start entyvio.     Interval History:  - current IBD meds:  Entyvio q 8 weeks (18- 10/27/20- stopped due to UTIs and palpitations, reinduced 24, LD 3/11, ND )  - recent IBD meds:  prednisone 40 mg/d (30 mg/d tomorrow and then plan on 20 mg/d , 10 mg/d 2/10, off 2/15)  - antidiarrheals: liquid imodium 2 mg 2-3 times/d  - 3/14/24 urine cultures: Escherichia ferusoni >100,000 cfu/ml, UTI- currently on keflex- has about 2-3 days left   - ileostomy output: 3 full bags, variable consistency  - NSAID use: No  - Narcotic use: No  - Alternative/complementary meds for IBD: No     Prior Pertinent Surgeries:   surgery with SB and 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       Last pertinent Endoscopy/Imagin23 CT A/P slow flow through few mid to distal SB loops noting venous vascular engorgement about these loops and wall hyperemia  1/3/24 EGD: normal except for gastric erythema, bx c/w mild HP neg gastritis   1/3/24 ileoscopy through stoma:  6 apthous ulcers in the distal 20 cm of the ileum and biopsies c/w active inflammation  1/3/24 abd US:  mildly dilated bile ducts in the central right hepatic lobe   24 MRCP c/w no evidence of biliary obstruction, punctate cystic focus in the pancreas tail likely side IPMN     Therapeutic Drug Monitoring Labs:  None     Prior IBD Therapies:  Proctofoam - partially effective  imuran (pancreatitis)  MTX (leukopenia)  Remicade- secondary nonresponder  Humira- discontinued due to drug induced lupus due to joint pains  Entocort- fall , broke open capsule when taking- not sure if  effective   Cimzia 2427-3930- secondary nonresponder  Stelara (7/11/17-1/2018)- discontinued due to rash  Canasa ineffective   Prednisone- effective- last took 1-2/2024    Vaccinations:  Lab Results   Component Value Date    HEPBSAB >1000.00 01/02/2024    HEPBSAB Reactive 01/02/2024     Lab Results   Component Value Date    HEPAIGG Non-reactive 01/02/2024     Lab Results   Component Value Date    VARICELLAZOS 883.00 01/02/2024    VARICELLAINT Positive 01/02/2024     Lab Results   Component Value Date    MUMPSIGGSCRE 26.30 01/02/2024    MUMPSIGGINTE Positive 01/02/2024      Lab Results   Component Value Date    RUBEOLAIGGAN 35.60 01/02/2024    RUBEOLAINTER Positive 01/02/2024     Immunization History   Administered Date(s) Administered    COVID-19, MRNA, LN-S, PF (Pfizer) (Purple Cap) 12/21/2020, 01/13/2021, 09/07/2021    Hepatitis A, Adult 02/12/2024    Hepatitis B (recombinant) Adjuvanted, 2 dose 12/26/2019, 01/23/2020    Influenza 09/18/2013, 10/25/2022    Influenza - Quadrivalent - PF *Preferred* (6 months and older) 09/18/2013, 10/04/2016, 11/17/2017, 09/13/2018, 10/02/2019, 09/10/2020, 09/24/2021, 10/25/2022, 10/09/2023    Influenza Split 09/18/2013    Pneumococcal Conjugate - 13 Valent 06/28/2017    Pneumococcal Conjugate - 20 Valent 01/30/2024    Pneumococcal Polysaccharide - 23 Valent 08/19/2015    Td (ADULT) 06/28/2013    Tdap 12/29/2019   Flu shot: recommended yearly   COVID vaccine/booster:  per CDC recommendations  RSV:  after age 61 yo  Tetanus (Tdap):  due 12/2029  HPV: declined  Meningococcal: NA  Hepatitis A:  due anytime 8/2024 to 2/2025  Shingrix: recommended, will schedule     Review of Systems   Constitutional:  Positive for fever. Negative for chills and weight loss.   HENT:          No oral ulcers, dysphagia, oral thrush   Eyes:  Negative for blurred vision, pain and redness.   Respiratory:  Negative for cough and shortness of breath.    Cardiovascular:  Negative for chest pain.    Gastrointestinal:  Positive for heartburn and nausea. Negative for abdominal pain and vomiting.   Genitourinary:  Positive for dysuria and hematuria.   Musculoskeletal:  Positive for back pain. Negative for joint pain.   Skin:  Negative for rash.   Psychiatric/Behavioral:  Negative for depression. The patient is nervous/anxious. The patient does not have insomnia.      All Medical History/Surgical History/Family History/Social History/Allergies have been reviewed and updated in EMR    Outpatient Medications Marked as Taking for the 3/21/24 encounter (Office Visit) with Peace Garcia MD   Medication Sig Dispense Refill    [] cephALEXin (KEFLEX) 500 MG capsule Take 1 capsule (500 mg total) by mouth every 12 (twelve) hours. for 7 days 14 capsule 0    clonazePAM (KLONOPIN) 1 MG tablet Take 1 tablet (1 mg total) by mouth 2 (two) times daily. 60 tablet 2    cyclobenzaprine (FLEXERIL) 10 MG tablet Take 1 tablet (10 mg total) by mouth every evening as needed for muscle pain 30 tablet 0    droNABinol (MARINOL) 5 MG capsule Take 1 capsule (5 mg total) by mouth 2 (two) times daily before meals. 60 capsule 1    estradiol 0.025 mg/24 hr 0.025 mg/24 hr Place 1 patch onto the skin once a week. 4 patch 11    gabapentin (NEURONTIN) 300 MG capsule Take 1 capsule (300 mg total) by mouth 2 (two) times daily. 60 capsule 5    loperamide (IMODIUM) 1 mg/7.5 mL solution Take 4 mg by mouth 3 (three) times daily as needed for Diarrhea.      mirtazapine (REMERON SOL-TAB) 30 MG disintegrating tablet Take 1 tablet (30 mg total) by mouth nightly. 30 tablet 0    multivitamin (THERAGRAN) per tablet Take 1 tablet by mouth once daily.      nadoloL (CORGARD) 40 MG tablet Take 1 tablet (40 mg total) by mouth once daily. Patient needs appointment before next refill. 30 tablet 2    pantoprazole (PROTONIX) 40 MG tablet Take 1 tablet (40 mg total) by mouth 2 (two) times daily. 60 tablet 12    [] terconazole (TERAZOL 7) 0.4 % Crea Place  "1 applicator vaginally every evening. for 7 days 45 g 0    valACYclovir (VALTREX) 500 MG tablet Take 1 tablet (500 mg total) by mouth once daily. 90 tablet 3    vedolizumab (ENTYVIO) 300 mg SolR injection Inject 300 mg into the vein every 8 weeks.      zolpidem (AMBIEN) 10 mg Tab Take 1 tablet (10 mg total) by mouth every evening. 30 tablet 2     Current Facility-Administered Medications for the 3/21/24 encounter (Office Visit) with Peace Garcia MD   Medication Dose Route Frequency Provider Last Rate Last Admin    estradiol valerate (DELESTROGEN) injection 20 mg/mL  20 mg Intramuscular Q30 Days Gilson El MD   20 mg at 12/30/22 0902     Vital Signs:  /77 (BP Location: Left arm, Patient Position: Sitting)   Pulse 72   Temp 98.1 °F (36.7 °C)   Ht 5' 1" (1.549 m)   Wt 55.2 kg (121 lb 11.1 oz)   LMP 03/30/2015   BMI 22.99 kg/m²      Physical Exam  Abdominal:      General: Bowel sounds are normal. There is no distension.      Palpations: Abdomen is soft.      Tenderness: There is no abdominal tenderness.     Labs:  Lab Results   Component Value Date    CRP 3.3 01/21/2024    CALPROTECTIN 117.6 (H) 01/24/2024     Lab Results   Component Value Date    HEPBSAG Non-reactive 03/04/2024    HEPBCAB Non-reactive 01/02/2024     Lab Results   Component Value Date    TBGOLDPLUS Negative 01/02/2024     Lab Results   Component Value Date    FWQHBRBY85QY 21 (L) 06/13/2019    ASATSYRA95 278 01/02/2024     Lab Results   Component Value Date    WBC 14.03 (H) 01/27/2024    HGB 9.8 (L) 01/27/2024    HCT 30.3 (L) 01/27/2024    MCV 87 01/27/2024     01/27/2024     Lab Results   Component Value Date    CREATININE 0.7 03/11/2024    ALBUMIN 2.9 (L) 01/27/2024    BILITOT 0.2 01/27/2024    ALKPHOS 82 01/27/2024    AST 11 01/27/2024    ALT 12 01/27/2024     Assessment/Plan:  Ivy Salgado is a 43 y.o.  with Crohn's disease with end ileostomy and recurrent ileitis, recurrent C diff (2014 x 2), ARPITA/depression, " arthritis, h/o abnormal pap smear- LYLA I, nephrolithiasis-nonobstructive, GERD, long QTc syndrome (Type III, on nadolol), s/p SHELLIE (2015) for recurrent ovarian cysts and endometriosis, early melanoma in situ (buttocks and para-stomal site, excised in 2018 and 2019 respectively), history of genital HSV, imuran induced pancreatitis, pancreatic tail cyst, recurrent UTI (h/o ESBL 2018), multiple thyroid nodules, osteopenia, history bacterial vaginosis, h/o abnormal LFTs (elevated ALP since 2016), family history of colon cancer.      # Crohn's disease with end ileostomy and recurrent ileitis:  - high ileostomy output- resolved, no longer requiring IVFs, continues liquid imodium 2 mg 2-3 times/d  - note melanoma, genital HSV- on valtrex prophylaxis, h/o endometriosis, recurrent UTIs  - recurrent UTI- completing keflex and will monitor for recurrence given that in past entyvio discontinued due to UTIs, pt to discuss if prophylaxis with macrobid would be helpful   - continue entyvio every 8 weeks   - ileoscopy through stoma- clear liquids day before- schedule end of 7/2024  - repeat stool calprotectin- will consider in future if needed but not sure if correlative with inflammation given stoma granulation tissue   - drug monitoring labs: CBC/CMP q 6 mos (7/2024), TPMT (normal metabolizer 1/2024), TB quantiferon (neg 1/2025), Hep B testing (HBsAg, HBtotalcoreAb neg 1/2025)    # Elevated liver tests (AST/ALT, ALP)  - AST/ALT now resolved with serologic w/u 9/2023 acute hep panel neg, 11/2023 HBsAg, HBcAb/SAURAV/AMA/ASMA neg, iron studies neg; 1/2024 normal ceruloplasmin A1AT normal  - due to elevated ALP (since 2019) MRCP done with no findings of PSC though pt has family hx PSC    # Pancreatic tail cyst (CT 12/2023)  - repeat CT in 12/2024  - will consider referral to pancreas cyst clinic     # 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 Simon- reminded to  make f/u appt   - skin exam yearly- due for skin exam 5/2024    # Bone health:  - left hip avascular necrosis on past CT  - osteopenia- has kidney stones so calcium supplements would defer or consult with renal/urology  - vit D deficiency- recommend 5000 IU 2 tabs daily    # Immunodeficiency due to long term immunosuppressive drug therapy and IBD specific health maintenance:  CRC risk- no risk, TAC  Pap smear- SHELLIE with BSO  Vaccines- no live vaccines, shingrix to be given- RN to administer    I spent a total of 40 minutes on the day of the visit.This includes face to face time and on-face to face time preparing to see the patient (eg, review of tests, notes), obtaining and/or reviewing separately obtained history, documenting clinical information in the electronic or other health record, independently interpreting results and communicating results to the patient/family/caregiver, and coordinating care.     Follow up in about 5 months (around 8/21/2024) for in person.    Peace Garcia MD  Department of Gastroenterology  Medical Director, Inflammatory Bowel Disease

## 2024-03-21 NOTE — PROGRESS NOTES
[facility]   Physical Therapy Treatment Note     Name: Ivy Salgado  Clinic Number: [id number]    Therapy Diagnosis:   Encounter Diagnosis   Name Primary?    Decreased range of motion of left shoulder Yes       Physician: Mono Giles III, *    Visit Date: 3/21/2024     Evaluation Date: 8/8/2023  Authorization Period Expiration: 7/6/2024  Plan of Care Expiration: 12/31/2023  Progress Note Due: 9/10/2023  Visit # / Visits authorized: 12/ 20  Foto: 3/3     Time In: 11:50  Time Out: 12:45  Total Billable Time: 32 minutes     DOS: 7/18/2023  S/p: left  1. Total shoulder arthroplasty (complex, -22 modifier)  2. Shoulder lysis of adhesions  3. Shoulder subpectoral biceps tenodesis (CPT 71860)  Post-Op Precautions: We will follow the shoulder arthroplasty guidelines. The patient remained in a sling for 6 weeks. We will start PT at the 3-week martina.       Precautions: none    SUBJECTIVE     Pt reports: have been working on strength at gym    She was compliant with home exercise program.  Response to previous treatment: improvement in pain at rest   Functional change: able to ttol reaching overhead a bit better    Pain: 3/10  Location: left shoulder      OBJECTIVE       Shoulder Active Range of Motion:   Shoulder Right Left   Flexion   150 135 pre   140 post   ER at 0   40 30      Shoulder Passive Range of Motion:   Shoulder Right Left   Flexion   160 150   ER at 0   60 40       Strength:   Right Left   Scaption 5/5 3+/5   Shoulder ER at side 5/5 3+/5   Shoulder IR at side  5/5 3+/5            Treatment     Ivy received the treatments listed below:        Manual therapy techniques: Joint mobilizations and Soft tissue Mobilization were applied to the: L shoulder for 8 minutes, including:  Thoracic manip -np  Scap mobs   STM to Subscap   Prone T/S mobs    Neuromuscular re-education activities to improve: motor control for 18 min  Hand heel rocks 30x   Walk outs ER otb with ER moment  initally per rep 3x10  Ant deltoid raises (mirro) 3x10 1#   Lateral deltoid raises (elbow bent, mirror) 2# 3x10     Therapeutic activities to improve functional performance for 29  minutes, including:  Thoracic rotations 15x B   Campbell Carry <-> 4x 7# DB (holding away from body slightly)  Resisted IR otb 3x15   Wall slide #1 5x6      Patient Education and Home Exercises     Home Exercises Provided and Patient Education Provided     Education provided:   - Add new exercises     Written Home Exercises Provided: yes. Exercises were reviewed and Ivy was able to demonstrate them prior to the end of the session.  Ivy demonstrated good  understanding of the education provided. See EMR under Patient Instructions for exercises provided during therapy sessions    ASSESSMENT     Ivy's AROM FL is improving. ER AROM getting stronger. Improvements seen with ER with band as well as ER with wall slide on wall with load. Pt still gets radicular symptoms into elbow. Pt was told to cont doing her UT wall slides at home as her shoulder is still depressed compared to contralateral side. Shoulder flexion improved 5 degrees after T/S manipulation by PT.    Ivy Is progressing well towards her goals.     Pt prognosis is Fair.     Pt will continue to benefit from skilled outpatient physical therapy to address the deficits listed in the problem list box on initial evaluation, provide pt/family education and to maximize pt's level of independence in the home and community environment.     Pt's spiritual, cultural and educational needs considered and pt agreeable to plan of care and goals.     Anticipated barriers to physical therapy: none     Goals:   Short Term Goals: 12 weeks  1. Pt will be compliant with HEP 50% of prescribed amount. met  2. The pt to demo improvement in R shoulder PROM to by 80% of the L met  3.  The pt to demo tolerance to being out of the sling for 24 hours with pain <2/10 met     Long Term Goals:  36 weeks    Pt will be compliant with % of prescribed amount.  met  The pt to improvement in AROM of L shoulder within 80% of R shoulder to improve tolerance to OH movement  met  The pt to  Demo at least 4+/5 of RTC muscle testing to demo improvement in tolerance to activity progressing, not met  The pt to tolerate lifting 50# from the ground to waist height per job requirement description progressing, not met  The pt will report full participation in ADLs and IADLs without restrictions related to L Shoulder.  progressing, not met    PLAN     Follow TSA procedure     Vidya Lemus PTA,

## 2024-03-23 ENCOUNTER — PATIENT MESSAGE (OUTPATIENT)
Dept: REHABILITATION | Facility: HOSPITAL | Age: 44
End: 2024-03-23
Payer: COMMERCIAL

## 2024-03-28 ENCOUNTER — CLINICAL SUPPORT (OUTPATIENT)
Dept: REHABILITATION | Facility: HOSPITAL | Age: 44
End: 2024-03-28
Payer: COMMERCIAL

## 2024-03-28 DIAGNOSIS — M25.612 DECREASED RANGE OF MOTION OF LEFT SHOULDER: Primary | ICD-10-CM

## 2024-03-28 PROCEDURE — 97530 THERAPEUTIC ACTIVITIES: CPT

## 2024-03-28 PROCEDURE — 97140 MANUAL THERAPY 1/> REGIONS: CPT

## 2024-03-28 PROCEDURE — 97112 NEUROMUSCULAR REEDUCATION: CPT

## 2024-03-28 NOTE — PROGRESS NOTES
[facility]   Physical Therapy Treatment Note     Name: Ivy Salgado  Clinic Number: [id number]    Therapy Diagnosis:   Encounter Diagnosis   Name Primary?    Decreased range of motion of left shoulder Yes       Physician: Mono Giles III, *    Visit Date: 3/28/2024     Evaluation Date: 8/8/2023  Authorization Period Expiration: 7/6/2024  Plan of Care Expiration: 12/31/2023  Progress Note Due: 9/10/2023  Visit # / Visits authorized: 13/ 20  Foto: 3/3     Time In: 1600  Time Out: 1700  Total Billable Time: 32 minutes     DOS: 7/18/2023  S/p: left  1. Total shoulder arthroplasty (complex, -22 modifier)  2. Shoulder lysis of adhesions  3. Shoulder subpectoral biceps tenodesis (CPT 43812)  Post-Op Precautions: We will follow the shoulder arthroplasty guidelines. The patient remained in a sling for 6 weeks. We will start PT at the 3-week martina.       Precautions: none    SUBJECTIVE     Pt reports: feels really run down today, not feeling like she can strengthen     She was compliant with home exercise program.  Response to previous treatment: improvement in pain at rest   Functional change: able to ttol reaching overhead a bit better    Pain: 3/10  Location: left shoulder      OBJECTIVE       Shoulder Active Range of Motion:   Shoulder Right Left   Flexion   150 135 pre   140 post   ER at 0   40 30      Shoulder Passive Range of Motion:   Shoulder Right Left   Flexion   160 150   ER at 0   60 40       Strength:   Right Left   Scaption 5/5 3+/5   Shoulder ER at side 5/5 3+/5   Shoulder IR at side  5/5 3+/5            Treatment     Ivy received the treatments listed below:        Manual therapy techniques: Joint mobilizations and Soft tissue Mobilization were applied to the: L shoulder for 8 minutes, including:  Thoracic manip  Scap mobs   STM to Subscap   Prone T/S mobs    Neuromuscular re-education activities to improve: motor control for 20 min  Hand heel rocks 30x   Table walk backs  10x10s   Wand chest press to flexion 30x     Next visit:   Ant deltoid raises (mirro) 3x10 1#   Lateral deltoid raises (elbow bent, mirror) 2# 3x10     Therapeutic activities to improve functional performance for 20  minutes, including:  UBE 3m forward / 3m backward L UE only   Thoracic rotations 15x B   Landmine 3# 30x     Next time:   Campbell Carry <-> 4x 7# DB (holding away from body slightly)  Resisted IR otb 3x15   Wall slide #1 5x6      Patient Education and Home Exercises     Home Exercises Provided and Patient Education Provided     Education provided:   - Add new exercises     Written Home Exercises Provided: yes. Exercises were reviewed and Ivy was able to demonstrate them prior to the end of the session.  Ivy demonstrated good  understanding of the education provided. See EMR under Patient Instructions for exercises provided during therapy sessions    ASSESSMENT     Due to the patient feeling under the weather, focused on general memovent of shoulder and ROM. The pt able to competle all. Will resume strengthening next visit.     Ivy Is progressing well towards her goals.     Pt prognosis is Fair.     Pt will continue to benefit from skilled outpatient physical therapy to address the deficits listed in the problem list box on initial evaluation, provide pt/family education and to maximize pt's level of independence in the home and community environment.     Pt's spiritual, cultural and educational needs considered and pt agreeable to plan of care and goals.     Anticipated barriers to physical therapy: none     Goals:   Short Term Goals: 12 weeks  1. Pt will be compliant with HEP 50% of prescribed amount. met  2. The pt to demo improvement in R shoulder PROM to by 80% of the L met  3.  The pt to demo tolerance to being out of the sling for 24 hours with pain <2/10 met     Long Term Goals:  36 weeks   Pt will be compliant with % of prescribed amount.  met  The pt to improvement in AROM of L  shoulder within 80% of R shoulder to improve tolerance to OH movement  met  The pt to  Demo at least 4+/5 of RTC muscle testing to demo improvement in tolerance to activity progressing, not met  The pt to tolerate lifting 50# from the ground to waist height per job requirement description progressing, not met  The pt will report full participation in ADLs and IADLs without restrictions related to L Shoulder.  progressing, not met    PLAN     Follow TSA procedure     Dorothy Basurto, PT,     33 yo M presenting with acute asthma exacerbation- plan for fluids, duonebs, steroids, dc       pt does not wish for tx in Ed, requests prescription be sent to pharmacy-- understands we will be unable to eval for sx improvement, dc with return recs

## 2024-03-30 ENCOUNTER — OFFICE VISIT (OUTPATIENT)
Dept: URGENT CARE | Facility: CLINIC | Age: 44
End: 2024-03-30
Payer: COMMERCIAL

## 2024-03-30 VITALS
RESPIRATION RATE: 20 BRPM | WEIGHT: 121 LBS | HEIGHT: 61 IN | TEMPERATURE: 99 F | DIASTOLIC BLOOD PRESSURE: 76 MMHG | SYSTOLIC BLOOD PRESSURE: 105 MMHG | OXYGEN SATURATION: 97 % | BODY MASS INDEX: 22.84 KG/M2 | HEART RATE: 77 BPM

## 2024-03-30 DIAGNOSIS — H66.91 RIGHT OTITIS MEDIA, UNSPECIFIED OTITIS MEDIA TYPE: Primary | ICD-10-CM

## 2024-03-30 DIAGNOSIS — R05.1 ACUTE COUGH: ICD-10-CM

## 2024-03-30 DIAGNOSIS — J02.9 SORE THROAT: ICD-10-CM

## 2024-03-30 DIAGNOSIS — H92.01 OTALGIA, RIGHT EAR: ICD-10-CM

## 2024-03-30 LAB
CTP QC/QA: YES
MOLECULAR STREP A: NEGATIVE

## 2024-03-30 PROCEDURE — 99213 OFFICE O/P EST LOW 20 MIN: CPT | Mod: 25,S$GLB,, | Performed by: NURSE PRACTITIONER

## 2024-03-30 PROCEDURE — 87651 STREP A DNA AMP PROBE: CPT | Mod: QW,S$GLB,, | Performed by: NURSE PRACTITIONER

## 2024-03-30 PROCEDURE — 96372 THER/PROPH/DIAG INJ SC/IM: CPT | Mod: S$GLB,,, | Performed by: NURSE PRACTITIONER

## 2024-03-30 RX ORDER — AMOXICILLIN AND CLAVULANATE POTASSIUM 875; 125 MG/1; MG/1
1 TABLET, FILM COATED ORAL EVERY 12 HOURS
Qty: 20 TABLET | Refills: 0 | Status: SHIPPED | OUTPATIENT
Start: 2024-03-30 | End: 2024-04-09

## 2024-03-30 RX ORDER — PROMETHAZINE HYDROCHLORIDE AND DEXTROMETHORPHAN HYDROBROMIDE 6.25; 15 MG/5ML; MG/5ML
5 SYRUP ORAL EVERY 8 HOURS PRN
Qty: 150 ML | Refills: 0 | Status: SHIPPED | OUTPATIENT
Start: 2024-03-30 | End: 2024-04-09

## 2024-03-30 RX ORDER — KETOROLAC TROMETHAMINE 30 MG/ML
30 INJECTION, SOLUTION INTRAMUSCULAR; INTRAVENOUS
Status: COMPLETED | OUTPATIENT
Start: 2024-03-30 | End: 2024-03-30

## 2024-03-30 RX ADMIN — KETOROLAC TROMETHAMINE 30 MG: 30 INJECTION, SOLUTION INTRAMUSCULAR; INTRAVENOUS at 03:03

## 2024-03-30 NOTE — PATIENT INSTRUCTIONS
Discharge instructions for  Right ear infection, cough and sore throat  Push fluids, stay hydrated  Follow up with PCP    When do I need to call the doctor? Ear Infection  Your symptoms are not getting better in 2 to 3 days.  You continue to have problems hearing after 2 to 3 weeks.  You have a fever of 100.4°F (38°C) or higher or chills.  You have discharge or fluid coming from your ear.  You have new or worsening symptoms.    What care is needed at home? Cough  Ask your doctor what you need to do when you go home. Make sure you ask questions if you do not understand what the doctor says.  To help you feel better:  Use a cool mist humidifier to avoid breathing dry air.  Use hard candy or cough drops to soothe sore throat and cough.  Gargle with salt water (mix 1/2 teaspoon salt with 1 cup warm water) a few times a day.  Spray saltwater mist in each nostril. Any normal saline spray works.  Sip warm liquids to keep your throat moist.  Take warm, steamy showers to help soothe the cough.  Do not smoke or be in smoke-filled places. Avoid things that may cause breathing problems like vaping, fumes, pollution, dust, and other common allergens.  You may want to use over-the-counter medicines for allergies or acid reflux if your cough is due to one of these problems.  You can also use an over-the-counter cough medicine.      1) See orders for this visit as documented in the electronic medical record.  2) Symptomatic therapy suggested: use acetaminophen/ibuprofen every 6-8 hours prn pain or fever, push fluids.   3) Call or return to clinic prn if these symptoms worsen or fail to improve as anticipated.    Discussed results/diagnosis/plan with patient in clinic.  We had shared decision making for patient's treatment. Patient verbalized understanding and in agreement with current treatment plan.     Patient was instructed to return for re-evaluation with urgent care or PCP for continued outpatient workup and management if  symptoms do not improve/worsening symptoms. Strict ED versus clinic precautions given in depth.    Discharge and follow-up instructions given verbally/printed with the patient who expressed understanding. The instructions and results are also available on [facility].      - You must understand that you have received an Urgent Care treatment only and that you may be released before all of your medical problems are known or treated.   - You, the patient, will arrange for follow up care as instructed.   - Follow up with your PCP or specialty clinic as directed in the next 1-2 weeks if not improved or as needed.  You can call [phone] to schedule an appointment with the appropriate provider.   - If your condition worsens or fails to improve we recommend that you receive another evaluation at the ER immediately or contact your PCP to discuss your concerns or return here.        SIMBA Austin

## 2024-03-30 NOTE — PROGRESS NOTES
"Subjective:      Patient ID: Ivy Salgado is a 43 y.o. female.    Vitals:  height is 5' 1" (1.549 m) and weight is 54.9 kg (121 lb). Her temperature is 98.9 °F (37.2 °C). Her blood pressure is 105/76 and her pulse is 77. Her respiration is 20 and oxygen saturation is 97%.     Chief Complaint: Sore Throat    This is a 43 y.o. female   who presents today with a chief complaint of a sore throat that began a day ago.     Sore Throat   This is a new problem. The current episode started yesterday. The problem has been gradually worsening. The pain is worse on the right side. There has been no fever. The pain is at a severity of 6/10. The pain is moderate. Associated symptoms include coughing, ear pain and trouble swallowing. Pertinent negatives include no abdominal pain, congestion, diarrhea, drooling, ear discharge, headaches, hoarse voice, plugged ear sensation, neck pain, shortness of breath, stridor, swollen glands or vomiting. She has had no exposure to strep or mono. She has tried nothing for the symptoms.     HENT:  Positive for ear pain, sore throat and trouble swallowing. Negative for ear discharge, drooling and congestion.    Neck: Negative for neck pain.   Respiratory:  Positive for cough. Negative for shortness of breath and stridor.    Gastrointestinal:  Negative for abdominal pain, vomiting and diarrhea.   Neurological:  Negative for headaches.      Objective:     Physical Exam   Constitutional: She is oriented to person, place, and time. She appears well-developed. She is cooperative.  Non-toxic appearance. She does not appear ill. No distress. awake  HENT:   Head: Normocephalic and atraumatic.   Ears:   Right Ear: Hearing and external ear normal. Tympanic membrane is erythematous (with Otalgia right ear) and bulging.   Left Ear: Hearing, tympanic membrane, external ear and ear canal normal.   Nose: Mucosal edema and congestion present. No rhinorrhea or nasal deformity. No epistaxis. Right sinus " exhibits no maxillary sinus tenderness and no frontal sinus tenderness. Left sinus exhibits no maxillary sinus tenderness and no frontal sinus tenderness.   Mouth/Throat: Uvula is midline and mucous membranes are normal. No trismus in the jaw. Normal dentition. No uvula swelling. Posterior oropharyngeal erythema present. No oropharyngeal exudate or posterior oropharyngeal edema.   Eyes: Conjunctivae and lids are normal. Pupils are equal, round, and reactive to light. No scleral icterus. Extraocular movement intact   Neck: Trachea normal and phonation normal. Neck supple. No thyromegaly present. No edema present. No erythema present. No neck rigidity present.   Cardiovascular: Normal rate, regular rhythm, S1 normal, S2 normal, normal heart sounds and normal pulses.   Pulmonary/Chest: Effort normal and breath sounds normal. No respiratory distress. She has no decreased breath sounds. She has no wheezes. She has no rhonchi.   Abdominal: Normal appearance and bowel sounds are normal. Soft. flat abdomen There is no abdominal tenderness.   Musculoskeletal: Normal range of motion.         General: No deformity. Normal range of motion.      Right lower leg: No edema.      Left lower leg: No edema.   Lymphadenopathy:     She has no cervical adenopathy.   Neurological: She is alert and oriented to person, place, and time. She exhibits normal muscle tone. Coordination normal.   Skin: Skin is warm, dry, intact, not diaphoretic and not pale. Capillary refill takes less than 2 seconds.   Psychiatric: Her speech is normal and behavior is normal. Mood, judgment and thought content normal.   Nursing note and vitals reviewed.      Results for orders placed or performed in visit on 03/30/24   POCT Strep A, Molecular   Result Value Ref Range    Molecular Strep A, POC Negative Negative     Acceptable Yes      *Note: Due to a large number of results and/or encounters for the requested time period, some results have not  been displayed. A complete set of results can be found in Results Review.       Assessment:     1. Right otitis media, unspecified otitis media type    2. Sore throat    3. Otalgia, right ear    4. Acute cough        Plan:       Right otitis media, unspecified otitis media type  -     amoxicillin-clavulanate 875-125mg (AUGMENTIN) 875-125 mg per tablet; Take 1 tablet by mouth every 12 (twelve) hours. for 10 days  Dispense: 20 tablet; Refill: 0    Sore throat  -     POCT Strep A, Molecular  -     ketorolac injection 30 mg    Otalgia, right ear  -     ketorolac injection 30 mg    Acute cough  -     promethazine-dextromethorphan (PROMETHAZINE-DM) 6.25-15 mg/5 mL Syrp; Take 5 mLs by mouth every 8 (eight) hours as needed (cough).  Dispense: 150 mL; Refill: 0      Patient Instructions     Discharge instructions for  Right ear infection, cough and sore throat  Push fluids, stay hydrated  Follow up with PCP    When do I need to call the doctor? Ear Infection  Your symptoms are not getting better in 2 to 3 days.  You continue to have problems hearing after 2 to 3 weeks.  You have a fever of 100.4°F (38°C) or higher or chills.  You have discharge or fluid coming from your ear.  You have new or worsening symptoms.    What care is needed at home? Cough  Ask your doctor what you need to do when you go home. Make sure you ask questions if you do not understand what the doctor says.  To help you feel better:  Use a cool mist humidifier to avoid breathing dry air.  Use hard candy or cough drops to soothe sore throat and cough.  Gargle with salt water (mix 1/2 teaspoon salt with 1 cup warm water) a few times a day.  Spray saltwater mist in each nostril. Any normal saline spray works.  Sip warm liquids to keep your throat moist.  Take warm, steamy showers to help soothe the cough.  Do not smoke or be in smoke-filled places. Avoid things that may cause breathing problems like vaping, fumes, pollution, dust, and other common  allergens.  You may want to use over-the-counter medicines for allergies or acid reflux if your cough is due to one of these problems.  You can also use an over-the-counter cough medicine.      1) See orders for this visit as documented in the electronic medical record.  2) Symptomatic therapy suggested: use acetaminophen/ibuprofen every 6-8 hours prn pain or fever, push fluids.   3) Call or return to clinic prn if these symptoms worsen or fail to improve as anticipated.    Discussed results/diagnosis/plan with patient in clinic.  We had shared decision making for patient's treatment. Patient verbalized understanding and in agreement with current treatment plan.     Patient was instructed to return for re-evaluation with urgent care or PCP for continued outpatient workup and management if symptoms do not improve/worsening symptoms. Strict ED versus clinic precautions given in depth.    Discharge and follow-up instructions given verbally/printed with the patient who expressed understanding. The instructions and results are also available on [employer].      - You must understand that you have received an Urgent Care treatment only and that you may be released before all of your medical problems are known or treated.   - You, the patient, will arrange for follow up care as instructed.   - Follow up with your PCP or specialty clinic as directed in the next 1-2 weeks if not improved or as needed.  You can call [phone] to schedule an appointment with the appropriate provider.   - If your condition worsens or fails to improve we recommend that you receive another evaluation at the ER immediately or contact your PCP to discuss your concerns or return here.        SIMBA Austin

## 2024-04-01 ENCOUNTER — PATIENT MESSAGE (OUTPATIENT)
Dept: GASTROENTEROLOGY | Facility: CLINIC | Age: 44
End: 2024-04-01
Payer: COMMERCIAL

## 2024-04-01 ENCOUNTER — PATIENT MESSAGE (OUTPATIENT)
Dept: INTERNAL MEDICINE | Facility: CLINIC | Age: 44
End: 2024-04-01
Payer: COMMERCIAL

## 2024-04-02 ENCOUNTER — CLINICAL SUPPORT (OUTPATIENT)
Dept: URGENT CARE | Facility: CLINIC | Age: 44
End: 2024-04-02
Payer: COMMERCIAL

## 2024-04-02 VITALS
HEIGHT: 61 IN | WEIGHT: 121 LBS | TEMPERATURE: 99 F | RESPIRATION RATE: 20 BRPM | OXYGEN SATURATION: 97 % | DIASTOLIC BLOOD PRESSURE: 73 MMHG | HEART RATE: 87 BPM | SYSTOLIC BLOOD PRESSURE: 104 MMHG | BODY MASS INDEX: 22.84 KG/M2

## 2024-04-02 DIAGNOSIS — J31.0 RHINITIS, UNSPECIFIED TYPE: ICD-10-CM

## 2024-04-02 DIAGNOSIS — R50.9 FEVER, UNSPECIFIED FEVER CAUSE: ICD-10-CM

## 2024-04-02 DIAGNOSIS — R05.9 COUGH, UNSPECIFIED TYPE: Primary | ICD-10-CM

## 2024-04-02 DIAGNOSIS — U07.1 COVID-19: ICD-10-CM

## 2024-04-02 LAB
CTP QC/QA: YES
CTP QC/QA: YES
POC MOLECULAR INFLUENZA A AGN: NEGATIVE
POC MOLECULAR INFLUENZA B AGN: NEGATIVE
SARS-COV-2 AG RESP QL IA.RAPID: POSITIVE

## 2024-04-02 PROCEDURE — 99213 OFFICE O/P EST LOW 20 MIN: CPT | Mod: S$GLB,,, | Performed by: FAMILY MEDICINE

## 2024-04-02 PROCEDURE — 87811 SARS-COV-2 COVID19 W/OPTIC: CPT | Mod: QW,S$GLB,, | Performed by: FAMILY MEDICINE

## 2024-04-02 PROCEDURE — 87502 INFLUENZA DNA AMP PROBE: CPT | Mod: QW,S$GLB,, | Performed by: FAMILY MEDICINE

## 2024-04-02 RX ORDER — BENZONATATE 200 MG/1
200 CAPSULE ORAL 3 TIMES DAILY PRN
Qty: 30 CAPSULE | Refills: 0 | Status: SHIPPED | OUTPATIENT
Start: 2024-04-02 | End: 2024-04-12

## 2024-04-02 RX ORDER — ALBUTEROL SULFATE 90 UG/1
2 AEROSOL, METERED RESPIRATORY (INHALATION) EVERY 6 HOURS PRN
Qty: 18 G | Refills: 0 | Status: SHIPPED | OUTPATIENT
Start: 2024-04-02

## 2024-04-02 RX ORDER — IPRATROPIUM BROMIDE 21 UG/1
2 SPRAY, METERED NASAL 2 TIMES DAILY PRN
Qty: 30 ML | Refills: 0 | Status: SHIPPED | OUTPATIENT
Start: 2024-04-02

## 2024-04-02 NOTE — PROGRESS NOTES
"Subjective:      Patient ID: Ivy Salgado is a 43 y.o. female.    Vitals:  height is 5' 1" (1.549 m) and weight is 54.9 kg (121 lb). Her temperature is 98.9 °F (37.2 °C). Her blood pressure is 104/73 and her pulse is 87. Her respiration is 20 and oxygen saturation is 97%.     Chief Complaint: Fever    This is a 43 y.o. female   who presents today with a chief complaint of a fever that began a day ago. She was seen two days ago for an ear infection. She's complaining of right ear fullness, congestion and a cough. She's been taking Tylenol to help relieve her symptoms.     Fever   This is a new problem. The current episode started yesterday. The problem occurs constantly. The problem has been gradually worsening. The maximum temperature noted was 100 to 100.9 F. Associated symptoms include congestion, coughing and ear pain. Pertinent negatives include no abdominal pain, chest pain, diarrhea, headaches, muscle aches, nausea, rash, sleepiness, sore throat, urinary pain, vomiting or wheezing. Treatments tried: tylenol. The treatment provided mild relief.       Constitution: Positive for fever.   HENT:  Positive for ear pain and congestion. Negative for sore throat.    Cardiovascular:  Negative for chest pain.   Respiratory:  Positive for cough. Negative for sputum production and wheezing.    Gastrointestinal:  Negative for abdominal pain, nausea, vomiting and diarrhea.   Genitourinary:  Negative for dysuria.   Skin:  Negative for rash.   Neurological:  Negative for headaches.      Objective:     Physical Exam   Constitutional: She is oriented to person, place, and time. No distress. normal  HENT:   Head: Normocephalic and atraumatic.   Ears:   Right Ear: Tympanic membrane, external ear and ear canal normal.   Left Ear: Tympanic membrane, external ear and ear canal normal.   Nose: Rhinorrhea and congestion present.   Mouth/Throat: Mucous membranes are dry. Oropharynx is clear.   Eyes: Conjunctivae are normal. Pupils " are equal, round, and reactive to light. Extraocular movement intact   Neck: Neck supple.   Cardiovascular: Normal rate, regular rhythm and normal pulses.   No murmur heard.  Pulmonary/Chest: Effort normal and breath sounds normal. No stridor. No respiratory distress. She has no wheezes. She has no rhonchi.   Abdominal: Normal appearance and bowel sounds are normal. Soft. flat abdomen   Musculoskeletal: Normal range of motion.         General: No swelling or tenderness. Normal range of motion.   Neurological: no focal deficit. She is alert and oriented to person, place, and time. No cranial nerve deficit.   Skin: Skin is warm and dry. Capillary refill takes less than 2 seconds. jaundice  Psychiatric: Her behavior is normal. Mood, judgment and thought content normal.   Nursing note and vitals reviewed.    Assessment:     Plan:     1. Cough, unspecified type  - SARS Coronavirus 2 Antigen, POCT Manual Read  - POCT Influenza A/B MOLECULAR  - benzonatate (TESSALON) 200 MG capsule; Take 1 capsule (200 mg total) by mouth 3 (three) times daily as needed.  Dispense: 30 capsule; Refill: 0    2. Fever, unspecified fever cause    3. Rhinitis, unspecified type  - ipratropium (ATROVENT) 21 mcg (0.03 %) nasal spray; 2 sprays by Each Nostril route 2 (two) times daily as needed.  Dispense: 30 mL; Refill: 0    4. COVID-19  - albuterol (VENTOLIN HFA) 90 mcg/actuation inhaler; Inhale 2 puffs into the lungs every 6 (six) hours as needed. Rescue  Dispense: 18 g; Refill: 0   All result discussed with pt prior to discharge from clinic

## 2024-04-04 ENCOUNTER — TELEPHONE (OUTPATIENT)
Dept: ENDOSCOPY | Facility: HOSPITAL | Age: 44
End: 2024-04-04
Payer: COMMERCIAL

## 2024-04-04 ENCOUNTER — PATIENT MESSAGE (OUTPATIENT)
Dept: ELECTROPHYSIOLOGY | Facility: CLINIC | Age: 44
End: 2024-04-04
Payer: COMMERCIAL

## 2024-04-04 DIAGNOSIS — K50.919 CROHN'S DISEASE WITH COMPLICATION, UNSPECIFIED GASTROINTESTINAL TRACT LOCATION: Primary | ICD-10-CM

## 2024-04-04 NOTE — TELEPHONE ENCOUNTER
"----- Message from Sia Valdez RN sent at 3/21/2024 11:20 AM CDT -----  Procedure: Ileoscopy thruy stoma    Diagnosis: Crohn's disease    Procedure Timin-12 weeks; end of 2024    #If within 4 weeks selected, please martina as high priority#    #If greater than 12 weeks, please select "5-12 weeks" and delay sending until 2 months prior to requested date#     Provider: RICHMOND Garcia    Location: [address]    Additional Scheduling Information: No scheduling concerns    Prep Specifications:Standard prep -  clear liquids day before per Dr. Garcia    Is the patient taking a GLP-1 Agonist:no    Have you attached a patient to this message: yes      "

## 2024-04-04 NOTE — TELEPHONE ENCOUNTER
Spoke to pt to schedule procedure(s) Ileoscopy through [facility]       Physician to perform procedure(s) Dr. SACHI Garcia  Date of Procedure (s) 7/15/24  Arrival Time 6:30 AM  Time of Procedure(s) 7:30 AM   Location of Procedure(s) [facility]  Type of Rx Prep sent to patient: Miralax  Instructions provided to patient via [facility]    Patient was informed on the following information and verbalized understanding. Screening questionnaire reviewed with patient and complete. If procedure requires anesthesia, a responsible adult needs to be present to accompany the patient home, patient cannot drive after receiving anesthesia. Appointment details are tentative, especially check-in time. Patient will receive a prep-op call 7 days prior to confirm check-in time for procedure. If applicable the patient should contact their pharmacy to verify Rx for procedure prep is ready for pick-up. Patient was advised to call the scheduling department at [phone] if pharmacy states no Rx is available. Patient was advised to call the endoscopy scheduling department if any questions or concerns arise.      [facility] Scheduling Department

## 2024-04-05 ENCOUNTER — PATIENT MESSAGE (OUTPATIENT)
Dept: REHABILITATION | Facility: HOSPITAL | Age: 44
End: 2024-04-05
Payer: COMMERCIAL

## 2024-04-05 DIAGNOSIS — F41.1 GENERALIZED ANXIETY DISORDER: ICD-10-CM

## 2024-04-05 RX ORDER — CLONAZEPAM 1 MG/1
1 TABLET ORAL 2 TIMES DAILY
Qty: 60 TABLET | Refills: 2 | Status: CANCELLED | OUTPATIENT
Start: 2024-04-05

## 2024-04-06 DIAGNOSIS — F41.1 GENERALIZED ANXIETY DISORDER: ICD-10-CM

## 2024-04-06 RX ORDER — CLONAZEPAM 1 MG/1
1 TABLET ORAL 2 TIMES DAILY
Qty: 60 TABLET | Refills: 2 | Status: CANCELLED | OUTPATIENT
Start: 2024-04-05

## 2024-04-06 NOTE — TELEPHONE ENCOUNTER
No care due was identified.  [facility] Embedded Care Due Messages. Reference number: [id number].   4/05/2024 8:07:15 PM CDT

## 2024-04-06 NOTE — TELEPHONE ENCOUNTER
No care due was identified.  [facility] Embedded Care Due Messages. Reference number: [id number].   4/06/2024 9:40:03 AM CDT

## 2024-04-08 ENCOUNTER — PATIENT MESSAGE (OUTPATIENT)
Dept: UROLOGY | Facility: CLINIC | Age: 44
End: 2024-04-08
Payer: COMMERCIAL

## 2024-04-08 DIAGNOSIS — R39.9 UTI SYMPTOMS: Primary | ICD-10-CM

## 2024-04-08 RX ORDER — CLONAZEPAM 1 MG/1
1 TABLET ORAL 2 TIMES DAILY
Qty: 60 TABLET | Refills: 2 | Status: SHIPPED | OUTPATIENT
Start: 2024-04-08

## 2024-04-09 ENCOUNTER — LAB VISIT (OUTPATIENT)
Dept: LAB | Facility: HOSPITAL | Age: 44
End: 2024-04-09
Payer: COMMERCIAL

## 2024-04-09 DIAGNOSIS — R39.9 UTI SYMPTOMS: ICD-10-CM

## 2024-04-09 PROCEDURE — 87088 URINE BACTERIA CULTURE: CPT | Performed by: NURSE PRACTITIONER

## 2024-04-09 PROCEDURE — 87086 URINE CULTURE/COLONY COUNT: CPT | Performed by: NURSE PRACTITIONER

## 2024-04-09 PROCEDURE — 87077 CULTURE AEROBIC IDENTIFY: CPT | Performed by: NURSE PRACTITIONER

## 2024-04-09 PROCEDURE — 87186 SC STD MICRODIL/AGAR DIL: CPT | Performed by: NURSE PRACTITIONER

## 2024-04-09 NOTE — PROGRESS NOTES
Subjective:     HPI    I had the pleasure of seeing Ivy Salgado in follow-up for her history of palpitations. She last saw me in 12/2021. 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 Associated symptoms include dizziness, presyncope, nausea and diaphoresis. Episodes would last 15-20 minutes, with gradual resolution.     Cardiac testing has included a 30 day event monitor (4/2018) which showed her episodes correlated with sinus tachycardia. A 48 hour Holter monitor (4/2018) showed sinus rhythm with an average HR of 87 bpm. A cardiac MRI in 9/2018 showed a structurally normal heart. An echo done in 3/2020 also showed a structurally normal heart. She has also had a work-up for pheochromocytoma in the past, which was negative.    Ms. Salgado was placed on toprol XL 25 mg daily in 2018, and instructed to maintain adequate hydration. She also stopped Entyvio (weekly infusion, which she felt precipitated her symptoms). She did not feel toprol helped, so she stopped this med.    Ms. Salgado was restarted on Entyvio in 10/2020 and had several episodes in the week following the infusion. She declined her most recent infusion. An ECG performed at that visit showed a QTc of 583 ms. At her initial visit with me, the plan was to start nadolol 40 mg daily (QTc 583 ms) and follow-up with her GI doc to discuss alternatives to Entyvio.    At her 2/2021 visit she was relatively well and the plan during that encounter was to continue on nadolol and arrange for genetic testing  For LQTS. 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     At 12/2021 visit Ms. Salgado was feeling great. Since starting nadolol Ms. Salgado's palpitations had markedly improved. They were occurring every few months, lasting  seconds. Ms. Salgado remained off Entyvio, and was only getting occasional normal saline boluses for intermittent dehydration. Plan at that time was to hold the course.    Today in the office Ms. Salgado states she is back on Entyvio. Overall doing much better on this med than she was a few yrs ago. Occasional palps, no presyncope or syncope. Still on nadolol.    My interpretation of today's ECG is sinus rhythm at 63 bpm with a QTc of 450 ms.    Review of Systems   Constitutional: Negative for decreased appetite, malaise/fatigue, weight gain and weight loss.   HENT:  Negative for sore throat.    Eyes:  Negative for blurred vision.   Cardiovascular:  Positive for palpitations. Negative for chest pain, dyspnea on exertion, irregular heartbeat, leg swelling, near-syncope, orthopnea, paroxysmal nocturnal dyspnea and syncope.   Respiratory:  Negative for shortness of breath.    Skin:  Negative for rash.   Musculoskeletal:  Negative for arthritis.   Gastrointestinal:  Negative for abdominal pain.   Neurological:  Negative for focal weakness.   Psychiatric/Behavioral:  Negative for altered mental status.         Objective:    Physical Exam  Vitals reviewed.   Constitutional:       General: She is not in acute distress.     Appearance: She is well-developed.   HENT:      Head: Normocephalic and atraumatic.   Eyes:      General: No scleral icterus.     Conjunctiva/sclera: Conjunctivae normal.      Pupils: Pupils are equal, round, and reactive to light.   Neck:      Thyroid: No thyromegaly.      Vascular: No JVD.   Cardiovascular:      Rate and Rhythm: Normal rate and regular rhythm.      Pulses: Intact distal pulses.      Heart sounds: Normal heart sounds. No murmur heard.     No friction rub. No gallop.   Pulmonary:      Effort: Pulmonary effort is normal. No respiratory distress.      Breath sounds: Normal breath sounds. No rales.   Abdominal:      General: Bowel sounds are normal. There is no distension.      Palpations:  Abdomen is soft.   Musculoskeletal:      Cervical back: Normal range of motion and neck supple.   Skin:     General: Skin is warm and dry.   Neurological:      Mental Status: She is alert and oriented to person, place, and time.   Psychiatric:         Behavior: Behavior normal.         Assessment:       1. Primary hypertension    2. History of prolonged Q-T interval on ECG    3. Long QT syndrome         Plan:       In summary, Ivy Salgado is a 43 year old female with a history of palpitations associated with sinus tachycardia. I again reassured Ms. Salgado that based on past work-ups her symptoms are certainly due to sinus tachycardia. I explained to her that sinus tachycardia is not associated with morbidity or mortality.    Ms. Salgado is now on nadolol for her symptoms as well as the prolonged QT seen on her 11/2020 ECG, with a marked improvement in her symptoms. 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 The plan is to continue nadolol, and to see me again in 2 years.    Thank you for allowing me to participate in the care of this patient. Please do not hesitate to call me with any questions or concerns.

## 2024-04-11 ENCOUNTER — PATIENT MESSAGE (OUTPATIENT)
Dept: GASTROENTEROLOGY | Facility: CLINIC | Age: 44
End: 2024-04-11
Payer: COMMERCIAL

## 2024-04-11 ENCOUNTER — CLINICAL SUPPORT (OUTPATIENT)
Dept: REHABILITATION | Facility: HOSPITAL | Age: 44
End: 2024-04-11
Payer: COMMERCIAL

## 2024-04-11 DIAGNOSIS — Z98.890 S/P SHOULDER SURGERY: ICD-10-CM

## 2024-04-11 DIAGNOSIS — M25.512 CHRONIC LEFT SHOULDER PAIN: ICD-10-CM

## 2024-04-11 DIAGNOSIS — G89.29 CHRONIC LEFT SHOULDER PAIN: ICD-10-CM

## 2024-04-11 DIAGNOSIS — M25.612 DECREASED RANGE OF MOTION OF LEFT SHOULDER: Primary | ICD-10-CM

## 2024-04-11 PROCEDURE — 97140 MANUAL THERAPY 1/> REGIONS: CPT

## 2024-04-11 PROCEDURE — 97530 THERAPEUTIC ACTIVITIES: CPT

## 2024-04-11 PROCEDURE — 97112 NEUROMUSCULAR REEDUCATION: CPT

## 2024-04-11 RX ORDER — CYCLOBENZAPRINE HCL 10 MG
10 TABLET ORAL NIGHTLY
Qty: 30 TABLET | Refills: 0 | Status: SHIPPED | OUTPATIENT
Start: 2024-04-11 | End: 2024-05-28 | Stop reason: SDUPTHER

## 2024-04-11 NOTE — PROGRESS NOTES
[facility]   Physical Therapy Treatment Note     Name: Ivy Salgado  Clinic Number: [id number]    Therapy Diagnosis:   Encounter Diagnosis   Name Primary?    Decreased range of motion of left shoulder Yes       Physician: Mono Giles III, *    Visit Date: 4/11/2024     Evaluation Date: 8/8/2023  Authorization Period Expiration: 7/6/2024  Plan of Care Expiration: 12/31/2023  Progress Note Due: 9/10/2023  Visit # / Visits authorized: 14/ 20  Foto: 3/3     Time In: 1500  Time Out: 1600  Total Billable Time: 55 minutes     DOS: 7/18/2023  S/p: left  1. Total shoulder arthroplasty (complex, -22 modifier)  2. Shoulder lysis of adhesions  3. Shoulder subpectoral biceps tenodesis (CPT 77967)  Post-Op Precautions: We will follow the shoulder arthroplasty guidelines. The patient remained in a sling for 6 weeks. We will start PT at the 3-week martina.       Precautions: none    SUBJECTIVE     Pt reports: she hurt her shoulder or upper back on her L side yesterday and it feels better today but still hurts.     She was compliant with home exercise program.  Response to previous treatment: improvement in pain at rest   Functional change: able to ttol reaching overhead a bit better    Pain: 3/10  Location: left shoulder      OBJECTIVE       Neck ROM:   Flexion: 100% hinge lower cervical   Extension: 75% * pain over pressure, hinge mid cervical   Rotation R: 65% pain full   Rotation L 100%     Treatment     Ivy received the treatments listed below:        Manual therapy techniques: Joint mobilizations and Soft tissue Mobilization were applied to the: L shoulder for 12 minutes, including:  Thoracic manip  CTJUNX Manip   Scap mobs   STM to Subscap       Neuromuscular re-education activities to improve: motor control for 20 min  Robots against wall 5x5   Hand heel rocks 30x   Should flexion walk backs 20x     Therapeutic activities to improve functional performance for 23  minutes, including:  UBE  3m forward / 3m backward L UE only   Thoracic rotations 15x B   Thoracic extensions over FR x5m         Patient Education and Home Exercises     Home Exercises Provided and Patient Education Provided     Education provided:   - Add new exercises     Written Home Exercises Provided: yes. Exercises were reviewed and Ivy was able to demonstrate them prior to the end of the session.  Ivy demonstrated good  understanding of the education provided. See EMR under Patient Instructions for exercises provided during therapy sessions    ASSESSMENT     The patient arrived with neck discomfort from continued hypomobile thoracic spine. The pt responded well to manual and able to complete exercises that focused on thoracic mobility and scap post tilt and upward rotation with less pain. Pain resolution upon leaving PT.     Ivy Is progressing well towards her goals.     Pt prognosis is Fair.     Pt will continue to benefit from skilled outpatient physical therapy to address the deficits listed in the problem list box on initial evaluation, provide pt/family education and to maximize pt's level of independence in the home and community environment.     Pt's spiritual, cultural and educational needs considered and pt agreeable to plan of care and goals.     Anticipated barriers to physical therapy: none     Goals:   Short Term Goals: 12 weeks  1. Pt will be compliant with HEP 50% of prescribed amount. met  2. The pt to demo improvement in R shoulder PROM to by 80% of the L met  3.  The pt to demo tolerance to being out of the sling for 24 hours with pain <2/10 met     Long Term Goals:  36 weeks   Pt will be compliant with % of prescribed amount.  met  The pt to improvement in AROM of L shoulder within 80% of R shoulder to improve tolerance to OH movement  met  The pt to  Demo at least 4+/5 of RTC muscle testing to demo improvement in tolerance to activity progressing, not met  The pt to tolerate lifting 50# from the  ground to waist height per job requirement description progressing, not met  The pt will report full participation in ADLs and IADLs without restrictions related to L Shoulder.  progressing, not met    PLAN     Follow TSA procedure     Dorothy Basurto, PT,

## 2024-04-12 DIAGNOSIS — N30.00 ACUTE CYSTITIS WITHOUT HEMATURIA: Primary | ICD-10-CM

## 2024-04-12 LAB — BACTERIA UR CULT: ABNORMAL

## 2024-04-12 RX ORDER — NITROFURANTOIN 25; 75 MG/1; MG/1
100 CAPSULE ORAL 2 TIMES DAILY
Qty: 14 CAPSULE | Refills: 0 | Status: SHIPPED | OUTPATIENT
Start: 2024-04-12 | End: 2024-04-25

## 2024-04-13 ENCOUNTER — PATIENT MESSAGE (OUTPATIENT)
Dept: WOUND CARE | Facility: CLINIC | Age: 44
End: 2024-04-13
Payer: COMMERCIAL

## 2024-04-15 ENCOUNTER — HOSPITAL ENCOUNTER (OUTPATIENT)
Dept: CARDIOLOGY | Facility: CLINIC | Age: 44
Discharge: HOME | End: 2024-04-15
Payer: COMMERCIAL

## 2024-04-15 ENCOUNTER — CLINICAL SUPPORT (OUTPATIENT)
Dept: REHABILITATION | Facility: HOSPITAL | Age: 44
End: 2024-04-15
Payer: COMMERCIAL

## 2024-04-15 ENCOUNTER — OFFICE VISIT (OUTPATIENT)
Dept: ELECTROPHYSIOLOGY | Facility: CLINIC | Age: 44
End: 2024-04-15
Payer: COMMERCIAL

## 2024-04-15 ENCOUNTER — PATIENT MESSAGE (OUTPATIENT)
Dept: REHABILITATION | Facility: HOSPITAL | Age: 44
End: 2024-04-15

## 2024-04-15 VITALS
WEIGHT: 122.13 LBS | DIASTOLIC BLOOD PRESSURE: 68 MMHG | HEART RATE: 60 BPM | BODY MASS INDEX: 23.06 KG/M2 | HEIGHT: 61 IN | SYSTOLIC BLOOD PRESSURE: 105 MMHG

## 2024-04-15 DIAGNOSIS — I45.81 LONG QT SYNDROME: ICD-10-CM

## 2024-04-15 DIAGNOSIS — I10 PRIMARY HYPERTENSION: Primary | ICD-10-CM

## 2024-04-15 DIAGNOSIS — M25.612 DECREASED RANGE OF MOTION OF LEFT SHOULDER: Primary | ICD-10-CM

## 2024-04-15 DIAGNOSIS — Z87.898 HISTORY OF PROLONGED Q-T INTERVAL ON ECG: ICD-10-CM

## 2024-04-15 LAB
OHS QRS DURATION: 76 MS
OHS QTC CALCULATION: 450 MS

## 2024-04-15 PROCEDURE — 93010 ELECTROCARDIOGRAM REPORT: CPT | Mod: S$GLB,,, | Performed by: INTERNAL MEDICINE

## 2024-04-15 PROCEDURE — 99214 OFFICE O/P EST MOD 30 MIN: CPT | Mod: S$GLB,,, | Performed by: INTERNAL MEDICINE

## 2024-04-15 PROCEDURE — 99999 PR PBB SHADOW E&M-EST. PATIENT-LVL III: CPT | Mod: PBBFAC,,, | Performed by: INTERNAL MEDICINE

## 2024-04-15 PROCEDURE — 3044F HG A1C LEVEL LT 7.0%: CPT | Mod: CPTII,S$GLB,, | Performed by: INTERNAL MEDICINE

## 2024-04-15 PROCEDURE — 3074F SYST BP LT 130 MM HG: CPT | Mod: CPTII,S$GLB,, | Performed by: INTERNAL MEDICINE

## 2024-04-15 PROCEDURE — 97530 THERAPEUTIC ACTIVITIES: CPT

## 2024-04-15 PROCEDURE — 93005 ELECTROCARDIOGRAM TRACING: CPT | Mod: S$GLB,,, | Performed by: INTERNAL MEDICINE

## 2024-04-15 PROCEDURE — 3008F BODY MASS INDEX DOCD: CPT | Mod: CPTII,S$GLB,, | Performed by: INTERNAL MEDICINE

## 2024-04-15 PROCEDURE — 1159F MED LIST DOCD IN RCRD: CPT | Mod: CPTII,S$GLB,, | Performed by: INTERNAL MEDICINE

## 2024-04-15 PROCEDURE — 97112 NEUROMUSCULAR REEDUCATION: CPT

## 2024-04-15 PROCEDURE — 3078F DIAST BP <80 MM HG: CPT | Mod: CPTII,S$GLB,, | Performed by: INTERNAL MEDICINE

## 2024-04-15 RX ORDER — NADOLOL 40 MG/1
40 TABLET ORAL DAILY
Qty: 90 TABLET | Refills: 7 | Status: SHIPPED | OUTPATIENT
Start: 2024-04-15 | End: 2026-04-05

## 2024-04-15 NOTE — PROGRESS NOTES
[facility]   Physical Therapy Treatment Note     Name: Ivy Salgado  Clinic Number: [id number]    Therapy Diagnosis:   Encounter Diagnosis   Name Primary?    Decreased range of motion of left shoulder Yes       Physician: Mono Giles III, *    Visit Date: 4/15/2024     Evaluation Date: 8/8/2023  Authorization Period Expiration: 7/6/2024  Plan of Care Expiration: 12/31/2023  Progress Note Due: 9/10/2023  Visit # / Visits authorized: 14/ 20  Foto: 3/3     Time In: 1500  Time Out: 1600  Total Billable Time: 55 minutes     DOS: 7/18/2023  S/p: left  1. Total shoulder arthroplasty (complex, -22 modifier)  2. Shoulder lysis of adhesions  3. Shoulder subpectoral biceps tenodesis (CPT 75248)  Post-Op Precautions: We will follow the shoulder arthroplasty guidelines. The patient remained in a sling for 6 weeks. We will start PT at the 3-week martina.       Precautions: none    SUBJECTIVE     Pt reports: she hurt her shoulder or upper back on her L side yesterday and it feels better today but still hurts.     She was compliant with home exercise program.  Response to previous treatment: improvement in pain at rest   Functional change: able to ttol reaching overhead a bit better    Pain: 3/10  Location: left shoulder      OBJECTIVE       Neck ROM:   Flexion: 100% hinge lower cervical   Extension: 75% * pain over pressure, hinge mid cervical   Rotation R: 65% pain full   Rotation L 100%     Treatment     Ivy received the treatments listed below:        Manual therapy techniques: Joint mobilizations and Soft tissue Mobilization were applied to the: L shoulder for 02 minutes, including:  Thoracic manip    Neuromuscular re-education activities to improve: motor control for 20 min  Robots against wall 5x5 - next time   Hand heel rocks 30x   Sidelying horiz ABD 2# 4x8     Therapeutic activities to improve functional performance for 30  minutes, including:  UBE 3m forward / 3m backward L UE only    Thoracic rotations 15x B   Thoracic extensions over FR x5m   Wall Slide 3# 3x10; 2# 1x10       Patient Education and Home Exercises     Home Exercises Provided and Patient Education Provided     Education provided:   - Add new exercises     Written Home Exercises Provided: yes. Exercises were reviewed and Ivy was able to demonstrate them prior to the end of the session.  Ivy demonstrated good  understanding of the education provided. See EMR under Patient Instructions for exercises provided during therapy sessions    ASSESSMENT     The pt with sig improvement in thoracic mobility and ability to reach arm overhead. The patient is making excellent progress. Focus on continued thoracic mobility and progressive strengthening.     Ivy Is progressing well towards her goals.     Pt prognosis is Fair.     Pt will continue to benefit from skilled outpatient physical therapy to address the deficits listed in the problem list box on initial evaluation, provide pt/family education and to maximize pt's level of independence in the home and community environment.     Pt's spiritual, cultural and educational needs considered and pt agreeable to plan of care and goals.     Anticipated barriers to physical therapy: none     Goals:   Short Term Goals: 12 weeks  1. Pt will be compliant with HEP 50% of prescribed amount. met  2. The pt to demo improvement in R shoulder PROM to by 80% of the L met  3.  The pt to demo tolerance to being out of the sling for 24 hours with pain <2/10 met     Long Term Goals:  36 weeks   Pt will be compliant with % of prescribed amount.  met  The pt to improvement in AROM of L shoulder within 80% of R shoulder to improve tolerance to OH movement  met  The pt to  Demo at least 4+/5 of RTC muscle testing to demo improvement in tolerance to activity progressing, not met  The pt to tolerate lifting 50# from the ground to waist height per job requirement description progressing, not met  The pt  will report full participation in ADLs and IADLs without restrictions related to L Shoulder.  progressing, not met    PLAN     Follow TSA procedure     Dorothy Basurto PT,

## 2024-04-16 ENCOUNTER — PATIENT MESSAGE (OUTPATIENT)
Dept: GASTROENTEROLOGY | Facility: CLINIC | Age: 44
End: 2024-04-16
Payer: COMMERCIAL

## 2024-04-16 ENCOUNTER — HOSPITAL ENCOUNTER (INPATIENT)
Facility: HOSPITAL | Age: 44
LOS: 2 days | Discharge: HOME | DRG: 389 | End: 2024-04-18
Attending: EMERGENCY MEDICINE | Admitting: EMERGENCY MEDICINE
Payer: COMMERCIAL

## 2024-04-16 DIAGNOSIS — K56.609 SBO (SMALL BOWEL OBSTRUCTION): Primary | ICD-10-CM

## 2024-04-16 DIAGNOSIS — R11.0 NAUSEA: ICD-10-CM

## 2024-04-16 DIAGNOSIS — R10.33 PERIUMBILICAL ABDOMINAL PAIN: ICD-10-CM

## 2024-04-16 LAB
ALBUMIN SERPL BCP-MCNC: 3.6 G/DL (ref 3.5–5.2)
ALP SERPL-CCNC: 91 U/L (ref 55–135)
ALT SERPL W/O P-5'-P-CCNC: 9 U/L (ref 10–44)
ANION GAP SERPL CALC-SCNC: 10 MMOL/L (ref 8–16)
AST SERPL-CCNC: 17 U/L (ref 10–40)
B-HCG UR QL: NEGATIVE
BASOPHILS # BLD AUTO: 0.04 K/UL (ref 0–0.2)
BASOPHILS NFR BLD: 0.5 % (ref 0–1.9)
BILIRUB SERPL-MCNC: 0.3 MG/DL (ref 0.1–1)
BILIRUB UR QL STRIP: NEGATIVE
BUN SERPL-MCNC: 12 MG/DL (ref 6–20)
CALCIUM SERPL-MCNC: 9.4 MG/DL (ref 8.7–10.5)
CHLORIDE SERPL-SCNC: 107 MMOL/L (ref 95–110)
CLARITY UR REFRACT.AUTO: CLEAR
CO2 SERPL-SCNC: 22 MMOL/L (ref 23–29)
COLOR UR AUTO: NORMAL
CREAT SERPL-MCNC: 0.7 MG/DL (ref 0.5–1.4)
CRP SERPL-MCNC: 1.6 MG/L (ref 0–8.2)
CTP QC/QA: YES
DIFFERENTIAL METHOD BLD: ABNORMAL
EOSINOPHIL # BLD AUTO: 0.2 K/UL (ref 0–0.5)
EOSINOPHIL NFR BLD: 1.7 % (ref 0–8)
ERYTHROCYTE [DISTWIDTH] IN BLOOD BY AUTOMATED COUNT: 14.8 % (ref 11.5–14.5)
EST. GFR  (NO RACE VARIABLE): >60 ML/MIN/1.73 M^2
GLUCOSE SERPL-MCNC: 96 MG/DL (ref 70–110)
GLUCOSE UR QL STRIP: NEGATIVE
HCT VFR BLD AUTO: 34.6 % (ref 37–48.5)
HGB BLD-MCNC: 10.4 G/DL (ref 12–16)
HGB UR QL STRIP: NEGATIVE
IMM GRANULOCYTES # BLD AUTO: 0.03 K/UL (ref 0–0.04)
IMM GRANULOCYTES NFR BLD AUTO: 0.3 % (ref 0–0.5)
KETONES UR QL STRIP: NEGATIVE
LEUKOCYTE ESTERASE UR QL STRIP: NEGATIVE
LIPASE SERPL-CCNC: 37 U/L (ref 4–60)
LYMPHOCYTES # BLD AUTO: 2.4 K/UL (ref 1–4.8)
LYMPHOCYTES NFR BLD: 27.2 % (ref 18–48)
MCH RBC QN AUTO: 23.9 PG (ref 27–31)
MCHC RBC AUTO-ENTMCNC: 30.1 G/DL (ref 32–36)
MCV RBC AUTO: 79 FL (ref 82–98)
MONOCYTES # BLD AUTO: 0.7 K/UL (ref 0.3–1)
MONOCYTES NFR BLD: 7.5 % (ref 4–15)
NEUTROPHILS # BLD AUTO: 5.4 K/UL (ref 1.8–7.7)
NEUTROPHILS NFR BLD: 62.8 % (ref 38–73)
NITRITE UR QL STRIP: NEGATIVE
NRBC BLD-RTO: 0 /100 WBC
PH UR STRIP: 5 [PH] (ref 5–8)
PLATELET # BLD AUTO: 381 K/UL (ref 150–450)
PMV BLD AUTO: 10.1 FL (ref 9.2–12.9)
POTASSIUM SERPL-SCNC: 3.7 MMOL/L (ref 3.5–5.1)
PROT SERPL-MCNC: 7.5 G/DL (ref 6–8.4)
PROT UR QL STRIP: NEGATIVE
RBC # BLD AUTO: 4.36 M/UL (ref 4–5.4)
SODIUM SERPL-SCNC: 139 MMOL/L (ref 136–145)
SP GR UR STRIP: 1.01 (ref 1–1.03)
URN SPEC COLLECT METH UR: NORMAL
WBC # BLD AUTO: 8.67 K/UL (ref 3.9–12.7)

## 2024-04-16 PROCEDURE — 25000003 PHARM REV CODE 250: Performed by: EMERGENCY MEDICINE

## 2024-04-16 PROCEDURE — 86140 C-REACTIVE PROTEIN: CPT | Performed by: EMERGENCY MEDICINE

## 2024-04-16 PROCEDURE — 83690 ASSAY OF LIPASE: CPT | Performed by: EMERGENCY MEDICINE

## 2024-04-16 PROCEDURE — 25500020 PHARM REV CODE 255: Performed by: EMERGENCY MEDICINE

## 2024-04-16 PROCEDURE — 63600175 PHARM REV CODE 636 W HCPCS: Performed by: STUDENT IN AN ORGANIZED HEALTH CARE EDUCATION/TRAINING PROGRAM

## 2024-04-16 PROCEDURE — 99222 1ST HOSP IP/OBS MODERATE 55: CPT | Mod: ,,, | Performed by: COLON & RECTAL SURGERY

## 2024-04-16 PROCEDURE — 81025 URINE PREGNANCY TEST: CPT | Performed by: EMERGENCY MEDICINE

## 2024-04-16 PROCEDURE — 25000003 PHARM REV CODE 250: Performed by: STUDENT IN AN ORGANIZED HEALTH CARE EDUCATION/TRAINING PROGRAM

## 2024-04-16 PROCEDURE — 85025 COMPLETE CBC W/AUTO DIFF WBC: CPT | Performed by: EMERGENCY MEDICINE

## 2024-04-16 PROCEDURE — 80053 COMPREHEN METABOLIC PANEL: CPT | Performed by: EMERGENCY MEDICINE

## 2024-04-16 PROCEDURE — 96374 THER/PROPH/DIAG INJ IV PUSH: CPT

## 2024-04-16 PROCEDURE — 96375 TX/PRO/DX INJ NEW DRUG ADDON: CPT

## 2024-04-16 PROCEDURE — 63600175 PHARM REV CODE 636 W HCPCS: Performed by: EMERGENCY MEDICINE

## 2024-04-16 PROCEDURE — 12000002 HC ACUTE/MED SURGE SEMI-PRIVATE ROOM

## 2024-04-16 PROCEDURE — 20600001 HC STEP DOWN PRIVATE ROOM

## 2024-04-16 PROCEDURE — 81003 URINALYSIS AUTO W/O SCOPE: CPT | Performed by: EMERGENCY MEDICINE

## 2024-04-16 PROCEDURE — 99285 EMERGENCY DEPT VISIT HI MDM: CPT | Mod: 25

## 2024-04-16 PROCEDURE — 96376 TX/PRO/DX INJ SAME DRUG ADON: CPT

## 2024-04-16 RX ORDER — PANTOPRAZOLE SODIUM 40 MG/1
40 TABLET, DELAYED RELEASE ORAL 2 TIMES DAILY
Status: DISCONTINUED | OUTPATIENT
Start: 2024-04-16 | End: 2024-04-18 | Stop reason: HOSPADM

## 2024-04-16 RX ORDER — MIRTAZAPINE 15 MG/1
30 TABLET, ORALLY DISINTEGRATING ORAL NIGHTLY
Status: DISCONTINUED | OUTPATIENT
Start: 2024-04-16 | End: 2024-04-18 | Stop reason: HOSPADM

## 2024-04-16 RX ORDER — NALOXONE HCL 0.4 MG/ML
0.02 VIAL (ML) INJECTION
Status: DISCONTINUED | OUTPATIENT
Start: 2024-04-16 | End: 2024-04-18 | Stop reason: HOSPADM

## 2024-04-16 RX ORDER — ACETAMINOPHEN 325 MG/1
650 TABLET ORAL EVERY 6 HOURS
Status: DISCONTINUED | OUTPATIENT
Start: 2024-04-16 | End: 2024-04-18 | Stop reason: HOSPADM

## 2024-04-16 RX ORDER — DIPHENHYDRAMINE HYDROCHLORIDE 50 MG/ML
12.5 INJECTION INTRAMUSCULAR; INTRAVENOUS EVERY 4 HOURS PRN
Status: DISCONTINUED | OUTPATIENT
Start: 2024-04-16 | End: 2024-04-18 | Stop reason: HOSPADM

## 2024-04-16 RX ORDER — MORPHINE SULFATE 4 MG/ML
4 INJECTION, SOLUTION INTRAMUSCULAR; INTRAVENOUS
Status: COMPLETED | OUTPATIENT
Start: 2024-04-16 | End: 2024-04-16

## 2024-04-16 RX ORDER — DEXTROSE MONOHYDRATE, SODIUM CHLORIDE, AND POTASSIUM CHLORIDE 50; 1.49; 4.5 G/1000ML; G/1000ML; G/1000ML
INJECTION, SOLUTION INTRAVENOUS CONTINUOUS
Status: DISCONTINUED | OUTPATIENT
Start: 2024-04-16 | End: 2024-04-18

## 2024-04-16 RX ORDER — PROCHLORPERAZINE EDISYLATE 5 MG/ML
5 INJECTION INTRAMUSCULAR; INTRAVENOUS EVERY 6 HOURS PRN
Status: DISCONTINUED | OUTPATIENT
Start: 2024-04-16 | End: 2024-04-18 | Stop reason: HOSPADM

## 2024-04-16 RX ORDER — HYDROMORPHONE HYDROCHLORIDE 1 MG/ML
0.2 INJECTION, SOLUTION INTRAMUSCULAR; INTRAVENOUS; SUBCUTANEOUS
Status: DISCONTINUED | OUTPATIENT
Start: 2024-04-16 | End: 2024-04-18 | Stop reason: HOSPADM

## 2024-04-16 RX ORDER — GABAPENTIN 300 MG/1
300 CAPSULE ORAL 2 TIMES DAILY
Status: DISCONTINUED | OUTPATIENT
Start: 2024-04-16 | End: 2024-04-18 | Stop reason: HOSPADM

## 2024-04-16 RX ORDER — PROMETHAZINE HYDROCHLORIDE 12.5 MG/1
25 TABLET ORAL EVERY 6 HOURS PRN
Status: DISCONTINUED | OUTPATIENT
Start: 2024-04-16 | End: 2024-04-18 | Stop reason: HOSPADM

## 2024-04-16 RX ORDER — SODIUM CHLORIDE 0.9 % (FLUSH) 0.9 %
10 SYRINGE (ML) INJECTION
Status: DISCONTINUED | OUTPATIENT
Start: 2024-04-16 | End: 2024-04-18 | Stop reason: HOSPADM

## 2024-04-16 RX ORDER — OXYCODONE HYDROCHLORIDE 5 MG/1
5 TABLET ORAL EVERY 4 HOURS PRN
Status: DISCONTINUED | OUTPATIENT
Start: 2024-04-16 | End: 2024-04-18 | Stop reason: HOSPADM

## 2024-04-16 RX ORDER — ENOXAPARIN SODIUM 100 MG/ML
40 INJECTION SUBCUTANEOUS EVERY 24 HOURS
Status: DISCONTINUED | OUTPATIENT
Start: 2024-04-17 | End: 2024-04-18 | Stop reason: HOSPADM

## 2024-04-16 RX ORDER — ALBUTEROL SULFATE 90 UG/1
2 AEROSOL, METERED RESPIRATORY (INHALATION) EVERY 6 HOURS PRN
Status: DISCONTINUED | OUTPATIENT
Start: 2024-04-16 | End: 2024-04-18 | Stop reason: HOSPADM

## 2024-04-16 RX ORDER — METOCLOPRAMIDE HYDROCHLORIDE 5 MG/ML
10 INJECTION INTRAMUSCULAR; INTRAVENOUS
Status: COMPLETED | OUTPATIENT
Start: 2024-04-16 | End: 2024-04-16

## 2024-04-16 RX ADMIN — HYDROMORPHONE HYDROCHLORIDE 0.2 MG: 1 INJECTION, SOLUTION INTRAMUSCULAR; INTRAVENOUS; SUBCUTANEOUS at 05:04

## 2024-04-16 RX ADMIN — MORPHINE SULFATE 4 MG: 4 INJECTION INTRAVENOUS at 01:04

## 2024-04-16 RX ADMIN — GABAPENTIN 300 MG: 300 CAPSULE ORAL at 10:04

## 2024-04-16 RX ADMIN — MIRTAZAPINE 30 MG: 15 TABLET, ORALLY DISINTEGRATING ORAL at 11:04

## 2024-04-16 RX ADMIN — PANTOPRAZOLE SODIUM 40 MG: 40 TABLET, DELAYED RELEASE ORAL at 10:04

## 2024-04-16 RX ADMIN — MORPHINE SULFATE 4 MG: 4 INJECTION INTRAVENOUS at 12:04

## 2024-04-16 RX ADMIN — HYDROMORPHONE HYDROCHLORIDE 0.2 MG: 1 INJECTION, SOLUTION INTRAMUSCULAR; INTRAVENOUS; SUBCUTANEOUS at 11:04

## 2024-04-16 RX ADMIN — PROCHLORPERAZINE EDISYLATE 5 MG: 5 INJECTION INTRAMUSCULAR; INTRAVENOUS at 11:04

## 2024-04-16 RX ADMIN — IOHEXOL 100 ML: 350 INJECTION, SOLUTION INTRAVENOUS at 12:04

## 2024-04-16 RX ADMIN — SODIUM CHLORIDE 500 ML: 9 INJECTION, SOLUTION INTRAVENOUS at 12:04

## 2024-04-16 RX ADMIN — METOCLOPRAMIDE 10 MG: 5 INJECTION, SOLUTION INTRAMUSCULAR; INTRAVENOUS at 12:04

## 2024-04-16 RX ADMIN — POTASSIUM CHLORIDE, DEXTROSE MONOHYDRATE AND SODIUM CHLORIDE: 150; 5; 450 INJECTION, SOLUTION INTRAVENOUS at 05:04

## 2024-04-16 NOTE — H&P
Colon and Rectal Surgery H&P     Patient Name:        Ivy Salgado  MRN:          [id number]  YOB: 1980  (43 y.o.)  Encounter Date:        4/16/2024  Primary Care Physician: Luis Madden DO    CC: small bowel obstruction    HPI:  Ivy Salgado is a 43 y.o. female with a PMHx of Crohn's with surgical history notable for completion proctocolectomy with end ileostomy 6/2012 with Dr. Parsons.     She presents with abdominal pain which started today. She reports eating yogurt this AM and going to work but feeling severe pain which prompted ED visit. She denies nausea, vomiting, peristomal pain. She reports her ostomy has been putting out a typical amount but has put out slightly less today. She denies fevers, chills, abdominal distention, dysuria.     Recent history:  Hospitalized 12/31/23-1/7/24 for high ileostomy output. CT at that time showed slow flow through few mid to distal SB loops noting venous vascular engorgement about these loops and wall hyperemia. On 1/3/24 she had EGD c/w mild HP neg gastritis and ileoscopy through stoma with about 6 apthous ulcers in the distal 20 cm of the ileum and biopsies c/w active inflammation.  Also admitted 1/16/24-1/27/24 at which time she continued on IVFs, antidiarrheals, pain meds and started on IV solumedrol.  1/24/24 stool calprotectin 117.6, ileoscopy through stoma with one small apthous ulcer 19 cm proximal to stoma. She had stool studies neg for infection and then discharged home on liquid imodium, pred 40 mg with taper. Last dose enyvio 3/11.       PAST MEDICAL HISTORY:  Past Medical History:   Diagnosis Date    Abnormal Pap smear 2007    Alkaline phosphatase elevation- based on lab from 4/9/2019 05/28/2019    Arthritis     Avascular necrosis of bone of hip, left     Bacterial vaginosis     Depression 08/05/2017    Drug-induced pancreatitis (imuran)     Generalized anxiety disorder     Genital HSV     GERD (gastroesophageal reflux  disease)     Hypertension     Ileostomy in place 07/09/2012    Kidney stone     Long QT syndrome     Melanoma in situ (excised-buttocks 2018, para-stomal site 2019)     Moderate episode of recurrent major depressive disorder 02/02/2024    Multiple thyroid nodules 11/27/2018    Osteopenia of multiple sites 01/07/2019    Pancreas cyst (tail, 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)     Recurrent Clostridioides difficile diarrhea     Recurrent UTI 04/03/2013       PAST SURGICAL HISTORY:  Past Surgical History:   Procedure Laterality Date    ABDOMINAL SURGERY      APPENDECTOMY      ARTHROPLASTY OF SHOULDER Left 7/18/2023    Procedure: ARTHROPLASTY, SHOULDER;  Surgeon: Sharon Babb MD;  Location: [facility];  Service: Orthopedics;  Laterality: Left;    AUGMENTATION OF BREAST Bilateral 06/2022    gel implants    BILATERAL SALPINGO-OOPHORECTOMY (BSO) Bilateral 05/30/2019    Procedure: SALPINGO-OOPHORECTOMY, BILATERAL;  Surgeon: Rupa German MD;  Location: [address];  Service: OB/GYN;  Laterality: Bilateral;    BLADDER SURGERY      partial cystectomy due to fistula    breast lift      BREAST SURGERY      [facility]      COLON SURGERY      COLONOSCOPY      CYSTOSCOPY  09/23/2020    Procedure: CYSTOSCOPY;  Surgeon: Sascha Florentino MD;  Location: [facility] OR [address] [place] [address];  Service: Urology;;    CYSTOSCOPY W/ URETERAL STENT PLACEMENT Left 09/15/2020    Procedure: CYSTOSCOPY, WITH URETERAL STENT INSERTION;  Surgeon: Sascha Florentino MD;  [address];  Service: Urology;  Laterality: Left;    DIAGNOSTIC LAPAROSCOPY N/A 07/09/2020    Procedure: LAPAROSCOPY, DIAGNOSTIC;  Surgeon: Gilson El MD;  Location: [facility];  Service: OB/GYN;  Laterality: N/A;    ESOPHAGOGASTRODUODENOSCOPY N/A 1/3/2024    Procedure: EGD (ESOPHAGOGASTRODUODENOSCOPY);  Surgeon: Lily Lind MD;  Location: [facility] ([address] [place] [address]);  Service: Endoscopy;  Laterality: N/A;    EXCISION OF MELANOMA  07/17/2019    ILEOSCOPY N/A 1/3/2024    Procedure: ILEOSCOPY;  Surgeon:  Lily Lind MD;  Location: [facility] ENDO (2ND FLR);  Service: Endoscopy;  Laterality: N/A;    ILEOSCOPY N/A 1/24/2024    Procedure: ILEOSCOPY;  Surgeon: Lilian Navarro MD;  Location: [facility] ENDO (2ND FLR);  Service: Endoscopy;  Laterality: N/A;  through stoma    ILEOSTOMY      LAPAROSCOPIC LYSIS OF ADHESIONS N/A 07/09/2020    Procedure: LYSIS, ADHESIONS, LAPAROSCOPIC;  Surgeon: Glison El MD;  Location: [facility] OR;  Service: OB/GYN;  Laterality: N/A;    LASER LITHOTRIPSY  09/23/2020    Procedure: LITHOTRIPSY, USING LASER;  Surgeon: Sascha Florentino MD;  Location: [address];  Service: Urology;;    LYSIS OF ADHESIONS N/A 05/30/2019    Procedure: LYSIS, ADHESIONS;  Surgeon: Rupa German MD;  [address];  Service: OB/GYN;  Laterality: N/A;    OOPHORECTOMY Right 04/16/2015    PORTACATH PLACEMENT  02/21/2017    SKIN BIOPSY      SMALL INTESTINE SURGERY      age 18 Y    TOTAL ABDOMINAL HYSTERECTOMY  04/16/2015    TOTAL COLECTOMY      TUBAL LIGATION  06/06/2012    UPPER GASTROINTESTINAL ENDOSCOPY      URETEROSCOPIC REMOVAL OF URETERIC CALCULUS  09/23/2020    Procedure: REMOVAL, CALCULUS, URETER, URETEROSCOPIC;  Surgeon: Sascha Florentino MD;  Location: [address];  Service: Urology;;    URETEROSCOPY Left 09/23/2020    Procedure: URETEROSCOPY;  Surgeon: Sascha Florentino MD;  Location: [address];  Service: Urology;  Laterality: Left;       SOCIAL HISTORY:  Social History     Tobacco Use    Smoking status: Never    Smokeless tobacco: Never   Substance Use Topics    Alcohol use: Not Currently     Alcohol/week: 0.0 standard drinks of alcohol    Drug use: No       FAMILY HISTORY:  Family History   Problem Relation Name Age of Onset    Diabetes Paternal Grandfather Stephen     Hearing loss Paternal Grandmother Viviane     Cancer Maternal Grandfather Philippe         Skin    Skin cancer Maternal Grandfather Philippe     Diabetes Maternal Grandfather Philippe     Heart disease  Maternal Grandfather Philippe     Colon cancer Father Milan     Cancer Father Milan         Colon    Liver cancer Father Milan     Hyperlipidemia Father Milan     Hypertension Mother Shaila     Crohn's disease Brother      Endometrial cancer Maternal Aunt      Breast cancer Maternal Cousin  41    Crohn's disease Daughter      Ovarian cancer Neg Hx      Melanoma Neg Hx         MEDICATIONS:  Home Meds:   Prior to Admission medications    Medication Sig Start Date End Date Taking? Authorizing Provider   albuterol (VENTOLIN HFA) 90 mcg/actuation inhaler Inhale 2 puffs into the lungs every 6 (six) hours as needed. Rescue  Patient not taking: Reported on 4/15/2024 4/2/24   Rosey Giles MD   clonazePAM (KLONOPIN) 1 MG tablet Take 1 tablet (1 mg total) by mouth 2 (two) times daily. 4/8/24   Luis Madden DO   cyclobenzaprine (FLEXERIL) 10 MG tablet Take 1 tablet (10 mg total) by mouth every evening as needed for muscle pain 4/11/24   Mono Giles III, MADHAV   diphenoxylate-atropine 2.5-0.025 mg/5 ml (LOMOTIL) 2.5-0.025 mg/5 mL liquid Take 2.5-5 ml up to 4 times a day - dose might change at visit on 1/30  Patient not taking: Reported on 4/15/2024 3/13/24   Peace Garcia MD   droNABinol (MARINOL) 5 MG capsule Take 1 capsule (5 mg total) by mouth 2 (two) times daily before meals. 10/26/23   Ankit Browning MD   estradiol 0.025 mg/24 hr 0.025 mg/24 hr Place 1 patch onto the skin once a week. 1/29/24 1/28/25  Tawanda Mahajan MD   gabapentin (NEURONTIN) 300 MG capsule Take 1 capsule (300 mg total) by mouth 2 (two) times daily. 3/20/24   Peace Garcia MD   ipratropium (ATROVENT) 21 mcg (0.03 %) nasal spray use 2 sprays by Each Nostril route 2 (two) times daily as needed.  Patient not taking: Reported on 4/15/2024 4/2/24   Rosey Giles MD   loperamide (IMODIUM) 1 mg/7.5 mL solution Take 4 mg by mouth 3 (three) times daily as needed for Diarrhea.    Provider, Lesley    mirtazapine (REMERON SOL-TAB) 30 MG disintegrating tablet Take 1 tablet (30 mg total) by mouth nightly. 10/31/23   Laurent Houston MD   multivitamin (THERAGRAN) per tablet Take 1 tablet by mouth once daily.    Provider, Historical   nadoloL (CORGARD) 40 MG tablet Take 1 tablet (40 mg total) by mouth once daily. Patient needs appointment before next refill. 4/15/24 4/5/26  Parth Monsalve MD   nitrofurantoin, macrocrystal-monohydrate, (MACROBID) 100 MG capsule Take 1 capsule (100 mg total) by mouth 2 (two) times daily. for 7 days 4/12/24 4/19/24  Diane Hunter NP   pantoprazole (PROTONIX) 40 MG tablet Take 1 tablet (40 mg total) by mouth 2 (two) times daily. 4/7/23   Parish Ross MD   tamsulosin (FLOMAX) 0.4 mg Cap Take 1 capsule (0.4 mg total) by mouth once daily.  Patient not taking: Reported on 4/15/2024 3/15/24 5/14/24  Weeks, Luis OCHOA DO   valACYclovir (VALTREX) 500 MG tablet Take 1 tablet (500 mg total) by mouth once daily. 10/7/23 10/6/24  Tawanda Mahajan MD   vedolizumab (ENTYVIO) 300 mg SolR injection Inject 300 mg into the vein every 8 weeks.    Provider, Historical   zolpidem (AMBIEN) 10 mg Tab Take 1 tablet (10 mg total) by mouth every evening. 2/15/24   Chano, Luis OCHOA DO     Scheduled Meds:  Current Facility-Administered Medications   Medication Dose Route Frequency Provider Last Rate Last Admin    acetaminophen tablet 650 mg  650 mg Oral Q6H Angelo Landaverde MD        albuterol inhaler 2 puff  2 puff Inhalation Q6H PRN Angelo Landaverde MD        dextrose 5 % and 0.45 % NaCl with KCl 20 mEq infusion   Intravenous Continuous Angelo Landaverde MD 75 mL/hr at 04/16/24 1717 New Bag at 04/16/24 1717    diphenhydrAMINE injection 12.5 mg  12.5 mg Intravenous Q4H PRN Angelo Landaverde MD        [START ON 4/17/2024] enoxaparin injection 40 mg  40 mg Subcutaneous Daily Angelo Landaverde MD        estradiol valerate (DELESTROGEN) injection 20 mg/mL  20 mg Intramuscular Q30 Days Gilson El MD   20 mg at  12/30/22 0902    gabapentin capsule 300 mg  300 mg Oral BID Angelo Landaverde MD        HYDROmorphone injection 0.2 mg  0.2 mg Intravenous Q2H PRN Angelo Landaverde MD   0.2 mg at 04/16/24 1743    mirtazapine disintegrating tablet 30 mg  30 mg Oral Nightly Angelo Landaverde MD        naloxone 0.4 mg/mL injection 0.02 mg  0.02 mg Intravenous PRN Angelo Landaverde MD        oxyCODONE immediate release tablet 5 mg  5 mg Oral Q4H PRN Angelo Landaverde MD        pantoprazole EC tablet 40 mg  40 mg Oral BID Angelo Landaverde MD        prochlorperazine injection Soln 5 mg  5 mg Intravenous Q6H PRN Angelo Landaverde MD        promethazine tablet 25 mg  25 mg Oral Q6H PRN Angelo Landaverde MD        sodium chloride 0.9% flush 10 mL  10 mL Intravenous PRN Angelo Landaverde MD         Current Outpatient Medications   Medication Sig Dispense Refill    albuterol (VENTOLIN HFA) 90 mcg/actuation inhaler Inhale 2 puffs into the lungs every 6 (six) hours as needed. Rescue (Patient not taking: Reported on 4/15/2024) 18 g 0    clonazePAM (KLONOPIN) 1 MG tablet Take 1 tablet (1 mg total) by mouth 2 (two) times daily. 60 tablet 2    cyclobenzaprine (FLEXERIL) 10 MG tablet Take 1 tablet (10 mg total) by mouth every evening as needed for muscle pain 30 tablet 0    diphenoxylate-atropine 2.5-0.025 mg/5 ml (LOMOTIL) 2.5-0.025 mg/5 mL liquid Take 2.5-5 ml up to 4 times a day - dose might change at visit on 1/30 (Patient not taking: Reported on 4/15/2024) 300 mL 0    droNABinol (MARINOL) 5 MG capsule Take 1 capsule (5 mg total) by mouth 2 (two) times daily before meals. 60 capsule 1    estradiol 0.025 mg/24 hr 0.025 mg/24 hr Place 1 patch onto the skin once a week. 4 patch 11    gabapentin (NEURONTIN) 300 MG capsule Take 1 capsule (300 mg total) by mouth 2 (two) times daily. 60 capsule 5    ipratropium (ATROVENT) 21 mcg (0.03 %) nasal spray use 2 sprays by Each Nostril route 2 (two) times daily as needed. (Patient not taking: Reported on 4/15/2024) 30 mL 0    loperamide  (IMODIUM) 1 mg/7.5 mL solution Take 4 mg by mouth 3 (three) times daily as needed for Diarrhea.      mirtazapine (REMERON SOL-TAB) 30 MG disintegrating tablet Take 1 tablet (30 mg total) by mouth nightly. 30 tablet 0    multivitamin (THERAGRAN) per tablet Take 1 tablet by mouth once daily.      nadoloL (CORGARD) 40 MG tablet Take 1 tablet (40 mg total) by mouth once daily. Patient needs appointment before next refill. 90 tablet 7    nitrofurantoin, macrocrystal-monohydrate, (MACROBID) 100 MG capsule Take 1 capsule (100 mg total) by mouth 2 (two) times daily. for 7 days 14 capsule 0    pantoprazole (PROTONIX) 40 MG tablet Take 1 tablet (40 mg total) by mouth 2 (two) times daily. 60 tablet 12    tamsulosin (FLOMAX) 0.4 mg Cap Take 1 capsule (0.4 mg total) by mouth once daily. (Patient not taking: Reported on 4/15/2024) 30 capsule 1    valACYclovir (VALTREX) 500 MG tablet Take 1 tablet (500 mg total) by mouth once daily. 90 tablet 3    vedolizumab (ENTYVIO) 300 mg SolR injection Inject 300 mg into the vein every 8 weeks.      zolpidem (AMBIEN) 10 mg Tab Take 1 tablet (10 mg total) by mouth every evening. 30 tablet 2     Continuous Infusions:  Current Facility-Administered Medications   Medication Dose Route Frequency Provider Last Rate Last Admin    acetaminophen tablet 650 mg  650 mg Oral Q6H Angelo Landaverde MD        albuterol inhaler 2 puff  2 puff Inhalation Q6H PRN Angelo Landaverde MD        dextrose 5 % and 0.45 % NaCl with KCl 20 mEq infusion   Intravenous Continuous Angelo Landaverde MD 75 mL/hr at 04/16/24 1717 New Bag at 04/16/24 1717    diphenhydrAMINE injection 12.5 mg  12.5 mg Intravenous Q4H PRN Angelo Landaverde MD        [START ON 4/17/2024] enoxaparin injection 40 mg  40 mg Subcutaneous Daily Angelo Landaverde MD        estradiol valerate (DELESTROGEN) injection 20 mg/mL  20 mg Intramuscular Q30 Days Gilson El MD   20 mg at 12/30/22 0902    gabapentin capsule 300 mg  300 mg Oral BID Angelo Landaverde MD         HYDROmorphone injection 0.2 mg  0.2 mg Intravenous Q2H PRN Angelo Landaverde MD   0.2 mg at 04/16/24 1743    mirtazapine disintegrating tablet 30 mg  30 mg Oral Nightly Angelo Landaverde MD        naloxone 0.4 mg/mL injection 0.02 mg  0.02 mg Intravenous PRN Angelo Landaverde MD        oxyCODONE immediate release tablet 5 mg  5 mg Oral Q4H PRN Angelo Landaverde MD        pantoprazole EC tablet 40 mg  40 mg Oral BID Angelo Landaverde MD        prochlorperazine injection Soln 5 mg  5 mg Intravenous Q6H PRN Angelo Landaverde MD        promethazine tablet 25 mg  25 mg Oral Q6H PRN Angelo Landaverde MD        sodium chloride 0.9% flush 10 mL  10 mL Intravenous PRN Angelo Landaverde MD         Current Outpatient Medications   Medication Sig Dispense Refill    albuterol (VENTOLIN HFA) 90 mcg/actuation inhaler Inhale 2 puffs into the lungs every 6 (six) hours as needed. Rescue (Patient not taking: Reported on 4/15/2024) 18 g 0    clonazePAM (KLONOPIN) 1 MG tablet Take 1 tablet (1 mg total) by mouth 2 (two) times daily. 60 tablet 2    cyclobenzaprine (FLEXERIL) 10 MG tablet Take 1 tablet (10 mg total) by mouth every evening as needed for muscle pain 30 tablet 0    diphenoxylate-atropine 2.5-0.025 mg/5 ml (LOMOTIL) 2.5-0.025 mg/5 mL liquid Take 2.5-5 ml up to 4 times a day - dose might change at visit on 1/30 (Patient not taking: Reported on 4/15/2024) 300 mL 0    droNABinol (MARINOL) 5 MG capsule Take 1 capsule (5 mg total) by mouth 2 (two) times daily before meals. 60 capsule 1    estradiol 0.025 mg/24 hr 0.025 mg/24 hr Place 1 patch onto the skin once a week. 4 patch 11    gabapentin (NEURONTIN) 300 MG capsule Take 1 capsule (300 mg total) by mouth 2 (two) times daily. 60 capsule 5    ipratropium (ATROVENT) 21 mcg (0.03 %) nasal spray use 2 sprays by Each Nostril route 2 (two) times daily as needed. (Patient not taking: Reported on 4/15/2024) 30 mL 0    loperamide (IMODIUM) 1 mg/7.5 mL solution Take 4 mg by mouth 3 (three) times daily as needed for  Diarrhea.      mirtazapine (REMERON SOL-TAB) 30 MG disintegrating tablet Take 1 tablet (30 mg total) by mouth nightly. 30 tablet 0    multivitamin (THERAGRAN) per tablet Take 1 tablet by mouth once daily.      nadoloL (CORGARD) 40 MG tablet Take 1 tablet (40 mg total) by mouth once daily. Patient needs appointment before next refill. 90 tablet 7    nitrofurantoin, macrocrystal-monohydrate, (MACROBID) 100 MG capsule Take 1 capsule (100 mg total) by mouth 2 (two) times daily. for 7 days 14 capsule 0    pantoprazole (PROTONIX) 40 MG tablet Take 1 tablet (40 mg total) by mouth 2 (two) times daily. 60 tablet 12    tamsulosin (FLOMAX) 0.4 mg Cap Take 1 capsule (0.4 mg total) by mouth once daily. (Patient not taking: Reported on 4/15/2024) 30 capsule 1    valACYclovir (VALTREX) 500 MG tablet Take 1 tablet (500 mg total) by mouth once daily. 90 tablet 3    vedolizumab (ENTYVIO) 300 mg SolR injection Inject 300 mg into the vein every 8 weeks.      zolpidem (AMBIEN) 10 mg Tab Take 1 tablet (10 mg total) by mouth every evening. 30 tablet 2     PRN Meds:.  Current Facility-Administered Medications   Medication Dose Route Frequency Provider Last Rate Last Admin    acetaminophen tablet 650 mg  650 mg Oral Q6H Angelo Landaverde MD        albuterol inhaler 2 puff  2 puff Inhalation Q6H PRN Angelo Landaverde MD        dextrose 5 % and 0.45 % NaCl with KCl 20 mEq infusion   Intravenous Continuous Angelo Landaverde MD 75 mL/hr at 04/16/24 1717 New Bag at 04/16/24 1717    diphenhydrAMINE injection 12.5 mg  12.5 mg Intravenous Q4H PRN Angelo Landaverde MD        [START ON 4/17/2024] enoxaparin injection 40 mg  40 mg Subcutaneous Daily Angelo Landaverde MD        estradiol valerate (DELESTROGEN) injection 20 mg/mL  20 mg Intramuscular Q30 Days Gilson El MD   20 mg at 12/30/22 0902    gabapentin capsule 300 mg  300 mg Oral BID Angelo Landaverde MD        HYDROmorphone injection 0.2 mg  0.2 mg Intravenous Q2H PRN Angelo Landaverde MD   0.2 mg at 04/16/24  1743    mirtazapine disintegrating tablet 30 mg  30 mg Oral Nightly Angelo Landaverde MD        naloxone 0.4 mg/mL injection 0.02 mg  0.02 mg Intravenous PRN Angelo Landaverde MD        oxyCODONE immediate release tablet 5 mg  5 mg Oral Q4H PRN Angelo Landaverde MD        pantoprazole EC tablet 40 mg  40 mg Oral BID Angelo Landaverde MD        prochlorperazine injection Soln 5 mg  5 mg Intravenous Q6H PRN Angelo Landaverde MD        promethazine tablet 25 mg  25 mg Oral Q6H PRN Angelo Landaverde MD        sodium chloride 0.9% flush 10 mL  10 mL Intravenous PRN Angelo Landaverde MD         Current Outpatient Medications   Medication Sig Dispense Refill    albuterol (VENTOLIN HFA) 90 mcg/actuation inhaler Inhale 2 puffs into the lungs every 6 (six) hours as needed. Rescue (Patient not taking: Reported on 4/15/2024) 18 g 0    clonazePAM (KLONOPIN) 1 MG tablet Take 1 tablet (1 mg total) by mouth 2 (two) times daily. 60 tablet 2    cyclobenzaprine (FLEXERIL) 10 MG tablet Take 1 tablet (10 mg total) by mouth every evening as needed for muscle pain 30 tablet 0    diphenoxylate-atropine 2.5-0.025 mg/5 ml (LOMOTIL) 2.5-0.025 mg/5 mL liquid Take 2.5-5 ml up to 4 times a day - dose might change at visit on 1/30 (Patient not taking: Reported on 4/15/2024) 300 mL 0    droNABinol (MARINOL) 5 MG capsule Take 1 capsule (5 mg total) by mouth 2 (two) times daily before meals. 60 capsule 1    estradiol 0.025 mg/24 hr 0.025 mg/24 hr Place 1 patch onto the skin once a week. 4 patch 11    gabapentin (NEURONTIN) 300 MG capsule Take 1 capsule (300 mg total) by mouth 2 (two) times daily. 60 capsule 5    ipratropium (ATROVENT) 21 mcg (0.03 %) nasal spray use 2 sprays by Each Nostril route 2 (two) times daily as needed. (Patient not taking: Reported on 4/15/2024) 30 mL 0    loperamide (IMODIUM) 1 mg/7.5 mL solution Take 4 mg by mouth 3 (three) times daily as needed for Diarrhea.      mirtazapine (REMERON SOL-TAB) 30 MG disintegrating tablet Take 1 tablet (30 mg total)  by mouth nightly. 30 tablet 0    multivitamin (THERAGRAN) per tablet Take 1 tablet by mouth once daily.      nadoloL (CORGARD) 40 MG tablet Take 1 tablet (40 mg total) by mouth once daily. Patient needs appointment before next refill. 90 tablet 7    nitrofurantoin, macrocrystal-monohydrate, (MACROBID) 100 MG capsule Take 1 capsule (100 mg total) by mouth 2 (two) times daily. for 7 days 14 capsule 0    pantoprazole (PROTONIX) 40 MG tablet Take 1 tablet (40 mg total) by mouth 2 (two) times daily. 60 tablet 12    tamsulosin (FLOMAX) 0.4 mg Cap Take 1 capsule (0.4 mg total) by mouth once daily. (Patient not taking: Reported on 4/15/2024) 30 capsule 1    valACYclovir (VALTREX) 500 MG tablet Take 1 tablet (500 mg total) by mouth once daily. 90 tablet 3    vedolizumab (ENTYVIO) 300 mg SolR injection Inject 300 mg into the vein every 8 weeks.      zolpidem (AMBIEN) 10 mg Tab Take 1 tablet (10 mg total) by mouth every evening. 30 tablet 2       ALLERGIES:  Azathioprine sodium, Methotrexate analogues, Stelara [ustekinumab], Zofran [ondansetron hcl (pf)], Vancomycin analogues, Azathioprine, Methotrexate, Morphine, Zofran [ondansetron hcl], Bactrim [sulfamethoxazole-trimethoprim], and Ciprofloxacin    REVIEW OF SYSTEMS:  General: Denies fevers/chills/sweats, unexpected weight loss, or night sweats  Respiratory: Denies cough, shortness of breath, hemoptysis, or other respiratory problems  Cardiovascular: Denies chest pain, BABCOCK, orthopnea/PND, palpitations, or syncope  Gastrointestinal: Denies GI symptoms currently, aside from those detailed above  Genitourinary:  Denies dysuria, urinary incontinence, urinary frequency, hematuria, fecaluria or pneumaturia  Musculoskeletal: Denies new focal bone pain or musculoskeletal complaints  Neurologic: Denies headaches, focal numbness/weakness    PHYSICAL EXAM:  PHYSICAL EXAMINATION:  Well-developed, well nourished, in no acute distress  Blood pressure 117/76, pulse 80, temperature 98.7 °F  "(37.1 °C), temperature source Oral, resp. rate 16, height 5' 1" (1.549 m), weight 54.4 kg (120 lb), last menstrual period 03/30/2015, SpO2 96%.  Body mass index is 22.67 kg/m².  HEENT: Sclerae anicteric, trachea midline  Pulm: Normal respiratory rate and effort on room air  Abd:   Soft, non-tender, non-distended. Ostomy site without surrounding skin changes. Succus in ostomy bag.   Ext:   Warm and well perfused. No upper or lower extremity edema bilaterally.  Neuro: Grossly intact with no focal neurological deficit.    LABORATORY RESULTS:  Complete Blood Count:  Recent Labs   Lab 04/16/24  1159   WBC 8.67   RBC 4.36   HGB 10.4*   HCT 34.6*          CRP:  Recent Labs   Lab 04/16/24  1159   CRP 1.6       Basic Chemistry Panel:  Recent Labs   Lab 04/16/24  1159      K 3.7      CO2 22*   BUN 12   CREATININE 0.7   CALCIUM 9.4       Hepatic Panel:  Recent Labs   Lab 04/16/24  1159   AST 17   ALT 9*   ALKPHOS 91   BILITOT 0.3   ALBUMIN 3.6       Nutrition Labs:  Recent Labs   Lab 04/16/24  1159   ALBUMIN 3.6       Coagulation Panel:  No results for input(s): "PT", "INR", "PTT" in the last 24 hours.    IMPRESSION:  Ivy Salgado is a 43 y.o. 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     She presents with abdominal pain which started today (4/16). Denies nausea, vomiting, peristomal pain. She reports her ostomy has been putting out a typical amount but has put out slightly less today. She is hemodynamically normal and with mild tenderness to palpation.    PLAN:  - Admit to CRS  - NPO  - OK for no NGT at this time, discussed with patient symptoms and signs which would require placement of NGT  - Consult IBD GI team  - Hold home constipating meds  - Continue home meds  - PRN pain control  - Strict I/O including ostomy output    Angelo Landaverde MD  Colon & Rectal Surgery    "

## 2024-04-16 NOTE — ED TRIAGE NOTES
Ivy Salgado, a 43 y.o. female presents to the ED w/ complaint of stomach pain started this morning with n/v    Triage note:  Chief Complaint   Patient presents with    Abdominal Pain     RLQ abdominal pain and nausea since this AM. Hx of chrons w/ ileostomy and appendectomy.      Review of patient's allergies indicates:   Allergen Reactions    Azathioprine sodium Other (See Comments)     Other reaction(s): pancreatitis  Other reaction(s): pancreatitis    Methotrexate analogues Other (See Comments)     leukopenia    Stelara [ustekinumab] Other (See Comments)     Multiple infections    Zofran [ondansetron hcl (pf)] Other (See Comments)     Per patient causes prolong QT    Vancomycin analogues Other (See Comments)     Made her red    Azathioprine      Other reaction(s): Unknown    Methotrexate      Other reaction(s): infection-    Morphine Itching and Other (See Comments)     Other reaction(s): Itching    Zofran [ondansetron hcl]      Other reaction(s): Hives    Bactrim [sulfamethoxazole-trimethoprim] Palpitations    Ciprofloxacin Palpitations     Past Medical History:   Diagnosis Date    Abnormal Pap smear 2007    Alkaline phosphatase elevation- based on lab from 4/9/2019 05/28/2019    Arthritis     Avascular necrosis of bone of hip, left     Bacterial vaginosis     Depression 08/05/2017    Drug-induced pancreatitis (imuran)     Generalized anxiety disorder     Genital HSV     GERD (gastroesophageal reflux disease)     Hypertension     Ileostomy in place 07/09/2012    Kidney stone     Long QT syndrome     Melanoma in situ (excised-buttocks 2018, para-stomal site 2019)     Moderate episode of recurrent major depressive disorder 02/02/2024    Multiple thyroid nodules 11/27/2018    Osteopenia of multiple sites 01/07/2019    Pancreas cyst (tail, 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)     Recurrent Clostridioides difficile diarrhea     Recurrent UTI 04/03/2013         APPEARANCE: awake and alert in NAD. PAIN  8/10  SKIN: warm, dry and  intact. No breakdown or bruising.  MUSCULOSKELETAL: Patient moving all extremities spontaneously, no obvious swelling or deformities noted. Ambulates independently.  RESPIRATORY: Denies shortness of breath.Respirations unlabored.   CARDIAC: Denies CP, 2+ distal pulses; no peripheral edema  ABDOMEN: S/ND/NT, Endorses nausea  : voids spontaneously, denies difficulty  Neurologic: AAO x 4; follows commands equal strength in all extremities; denies numbness/tingling. Denies dizziness

## 2024-04-16 NOTE — ED NOTES
Assumed care for pt after recieving report from nightshift RN. Pt. resting in bed in NAD, RR e/u. Vital signs stable and within desired limits at this time of assessment. Pt. offered bathroom assistance and denies need at this time. Explanation of care/wait provided. Pt verbalizes no needs at this time. Bed in low, locked position with rails up and call bell in reach. Pt's white board updated with today's care team and plan.     Patient identifiers for Ivy Salgado 43 y.o. female checked and correct.  Chief Complaint   Patient presents with    Abdominal Pain     RLQ abdominal pain and nausea since this AM. Hx of chrons w/ ileostomy and appendectomy.      Past Medical History:   Diagnosis Date    Abnormal Pap smear 2007    Alkaline phosphatase elevation- based on lab from 4/9/2019 05/28/2019    Arthritis     Avascular necrosis of bone of hip, left     Bacterial vaginosis     Depression 08/05/2017    Drug-induced pancreatitis (imuran)     Generalized anxiety disorder     Genital HSV     GERD (gastroesophageal reflux disease)     Hypertension     Ileostomy in place 07/09/2012    Kidney stone     Long QT syndrome     Melanoma in situ (excised-buttocks 2018, para-stomal site 2019)     Moderate episode of recurrent major depressive disorder 02/02/2024    Multiple thyroid nodules 11/27/2018    Osteopenia of multiple sites 01/07/2019    Pancreas cyst (tail, 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)     Recurrent Clostridioides difficile diarrhea     Recurrent UTI 04/03/2013     Allergies reported:   Review of patient's allergies indicates:   Allergen Reactions    Azathioprine sodium Other (See Comments)     Other reaction(s): pancreatitis  Other reaction(s): pancreatitis    Methotrexate analogues Other (See Comments)     leukopenia    Stelara [ustekinumab] Other (See Comments)     Multiple infections    Zofran [ondansetron hcl (pf)] Other (See Comments)     Per patient causes prolong QT    Vancomycin analogues Other (See Comments)     Made her  red    Azathioprine      Other reaction(s): Unknown    Methotrexate      Other reaction(s): infection-    Morphine Itching and Other (See Comments)     Other reaction(s): Itching    Zofran [ondansetron hcl]      Other reaction(s): Hives    Bactrim [sulfamethoxazole-trimethoprim] Palpitations    Ciprofloxacin Palpitations         LOC: Patient is awake, alert, and aware of environment with an appropriate affect. Patient is oriented x 4 and speaking appropriately.  APPEARANCE: Patient resting comfortably and in no acute distress. Patient is clean and well groomed, patient's clothing is properly fastened.  HEENT: WDL  SKIN: The skin is warm and dry. Patient has normal skin turgor and moist mucus membranes.   MUSCULOSKELETAL: Patient is moving all extremities well, no obvious deformities noted. Pulses intact.   RESPIRATORY: Airway is open and patent. Respirations are spontaneous and non-labored with normal effort and rate.  CARDIAC: Patient has a normal rate and rhythm. 70 on cardiac monitor. No peripheral edema noted. Denies chest pain and SOB at at time of assessment.   ABDOMEN: No distention noted. Soft and non-tender upon palpation. Colostomy bag noted to RLQ  NEUROLOGICAL: pupils 3mm, PERRL. Facial expression is symmetrical. Hand grasps are equal bilaterally. Normal sensation in all extremities when touched with finger.

## 2024-04-16 NOTE — ED PROVIDER NOTES
Encounter Date: 4/16/2024       History     Chief Complaint   Patient presents with    Abdominal Pain     RLQ abdominal pain and nausea since this AM. Hx of chrons w/ ileostomy and appendectomy.      43-year-old female with a history of Crohn's status post ileostomy presents with right-sided abdominal pain.  Started this morning.  Radiates to the midline.  Associated nausea but no vomiting.  Normal output from ileostomy.  She is status post appendectomy.  She denies fever, cough, shortness of breath, chest pain, or dysuria.  She was diagnosed with a UTI recently and took 1 dose of Macrobid.  The patients available PMH, PSH, Social History, medications, allergies, and triage vital signs were reviewed just prior to their medical evaluation.  Works as a nurse in day of surgery.         Review of patient's allergies indicates:   Allergen Reactions    Azathioprine sodium Other (See Comments)     Other reaction(s): pancreatitis  Other reaction(s): pancreatitis    Methotrexate analogues Other (See Comments)     leukopenia    Stelara [ustekinumab] Other (See Comments)     Multiple infections    Zofran [ondansetron hcl (pf)] Other (See Comments)     Per patient causes prolong QT    Vancomycin analogues Other (See Comments)     Made her red    Azathioprine      Other reaction(s): Unknown    Methotrexate      Other reaction(s): infection-    Morphine Itching and Other (See Comments)     Other reaction(s): Itching    Zofran [ondansetron hcl]      Other reaction(s): Hives    Bactrim [sulfamethoxazole-trimethoprim] Palpitations    Ciprofloxacin Palpitations     Past Medical History:   Diagnosis Date    Abnormal Pap smear 2007    Alkaline phosphatase elevation- based on lab from 4/9/2019 05/28/2019    Arthritis     Avascular necrosis of bone of hip, left     Bacterial vaginosis     Depression 08/05/2017    Drug-induced pancreatitis (imuran)     Generalized anxiety disorder     Genital HSV     GERD (gastroesophageal reflux  disease)     Hypertension     Ileostomy in place 07/09/2012    Kidney stone     Long QT syndrome     Melanoma in situ (excised-buttocks 2018, para-stomal site 2019)     Moderate episode of recurrent major depressive disorder 02/02/2024    Multiple thyroid nodules 11/27/2018    Osteopenia of multiple sites 01/07/2019    Pancreas cyst (tail, 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)     Recurrent Clostridioides difficile diarrhea     Recurrent UTI 04/03/2013     Past Surgical History:   Procedure Laterality Date    ABDOMINAL SURGERY      APPENDECTOMY      ARTHROPLASTY OF SHOULDER Left 7/18/2023    Procedure: ARTHROPLASTY, SHOULDER;  Surgeon: Sharon Babb MD;  Location: [facility];  Service: Orthopedics;  Laterality: Left;    AUGMENTATION OF BREAST Bilateral 06/2022    gel implants    BILATERAL SALPINGO-OOPHORECTOMY (BSO) Bilateral 05/30/2019    Procedure: SALPINGO-OOPHORECTOMY, BILATERAL;  Surgeon: Rupa German MD;  [address];  Service: OB/GYN;  Laterality: Bilateral;    BLADDER SURGERY      partial cystectomy due to fistula    breast lift      BREAST SURGERY      [facility]      COLON SURGERY      COLONOSCOPY      CYSTOSCOPY  09/23/2020    Procedure: CYSTOSCOPY;  Surgeon: Sascha Florentino MD;  [address];  Service: Urology;;    CYSTOSCOPY W/ URETERAL STENT PLACEMENT Left 09/15/2020    Procedure: CYSTOSCOPY, WITH URETERAL STENT INSERTION;  Surgeon: Sascha Florentino MD;  [address];  Service: Urology;  Laterality: Left;    DIAGNOSTIC LAPAROSCOPY N/A 07/09/2020    Procedure: LAPAROSCOPY, DIAGNOSTIC;  Surgeon: Gilson El MD;  Location: [facility];  Service: OB/GYN;  Laterality: N/A;    ESOPHAGOGASTRODUODENOSCOPY N/A 1/3/2024    Procedure: EGD (ESOPHAGOGASTRODUODENOSCOPY);  Surgeon: Lily Lind MD;  Location: [facility] (2ND FLR);  Service: Endoscopy;  Laterality: N/A;    EXCISION OF MELANOMA  07/17/2019    ILEOSCOPY N/A 1/3/2024    Procedure: ILEOSCOPY;  Surgeon: Lily Lind MD;   Location: [facility] ENDO (2ND FLR);  Service: Endoscopy;  Laterality: N/A;    ILEOSCOPY N/A 1/24/2024    Procedure: ILEOSCOPY;  Surgeon: Lilian Navarro MD;  Location: [facility] ENDO (2ND FLR);  Service: Endoscopy;  Laterality: N/A;  through stoma    ILEOSTOMY      LAPAROSCOPIC LYSIS OF ADHESIONS N/A 07/09/2020    Procedure: LYSIS, ADHESIONS, LAPAROSCOPIC;  Surgeon: Gilson El MD;  Location: [facility] OR;  Service: OB/GYN;  Laterality: N/A;    LASER LITHOTRIPSY  09/23/2020    Procedure: LITHOTRIPSY, USING LASER;  Surgeon: Sascha Florentino MD;  [facility];  Service: Urology;;    LYSIS OF ADHESIONS N/A 05/30/2019    Procedure: LYSIS, ADHESIONS;  Surgeon: Rupa German MD;  [address];  Service: OB/GYN;  Laterality: N/A;    OOPHORECTOMY Right 04/16/2015    PORTACATH PLACEMENT  02/21/2017    SKIN BIOPSY      SMALL INTESTINE SURGERY      age 18 Y    TOTAL ABDOMINAL HYSTERECTOMY  04/16/2015    TOTAL COLECTOMY      TUBAL LIGATION  06/06/2012    UPPER GASTROINTESTINAL ENDOSCOPY      URETEROSCOPIC REMOVAL OF URETERIC CALCULUS  09/23/2020    Procedure: REMOVAL, CALCULUS, URETER, URETEROSCOPIC;  Surgeon: Sascha Florentino MD;  [address];  Service: Urology;;    URETEROSCOPY Left 09/23/2020    Procedure: URETEROSCOPY;  Surgeon: Sascha Florentino MD;  [address];  Service: Urology;  Laterality: Left;     Family History   Problem Relation Name Age of Onset    Diabetes Paternal Grandfather Stephen     Hearing loss Paternal Grandmother Viviane     Cancer Maternal Grandfather Philippe         Skin    Skin cancer Maternal Grandfather Philippe     Diabetes Maternal Grandfather Philippe     Heart disease Maternal Grandfather Philippe     Colon cancer Father Milan     Cancer Father Milan         Colon    Liver cancer Father Milan     Hyperlipidemia Father Milan     Hypertension Mother Shaila     Crohn's disease Brother      Endometrial cancer Maternal Aunt      Breast  cancer Maternal Cousin  41    Crohn's disease Daughter      Ovarian cancer Neg Hx      Melanoma Neg Hx       Social History     Tobacco Use    Smoking status: Never    Smokeless tobacco: Never   Substance Use Topics    Alcohol use: Not Currently     Alcohol/week: 0.0 standard drinks of alcohol    Drug use: No     Review of Systems   Constitutional:  Negative for fever.   Respiratory:  Negative for cough and shortness of breath.    Cardiovascular:  Negative for chest pain.   Gastrointestinal:  Positive for abdominal pain and nausea. Negative for diarrhea and vomiting.   Genitourinary:  Negative for dysuria.       Physical Exam     Initial Vitals [04/16/24 1033]   BP Pulse Resp Temp SpO2   (!) 140/76 76 16 98.8 °F (37.1 °C) 99 %      MAP       --         Physical Exam    Nursing note and vitals reviewed.  Constitutional: She appears well-developed and well-nourished. She is not diaphoretic. No distress.   HENT:   Head: Normocephalic and atraumatic.   Nose: Nose normal.   Eyes: Conjunctivae are normal. Right eye exhibits no discharge. Left eye exhibits no discharge.   Neck: Neck supple.   Normal range of motion.  Cardiovascular:  Normal rate, regular rhythm and normal heart sounds.     Exam reveals no gallop and no friction rub.       No murmur heard.  Pulmonary/Chest: Breath sounds normal. No respiratory distress. She has no wheezes. She has no rhonchi. She has no rales.   Abdominal: Abdomen is soft. She exhibits no distension. There is no abdominal tenderness.   Ileostomy in place, no reproducible ttp There is no rebound and no guarding.   Musculoskeletal:         General: No tenderness or edema. Normal range of motion.      Cervical back: Normal range of motion and neck supple.     Neurological: She is alert and oriented to person, place, and time. She has normal strength. GCS score is 15. GCS eye subscore is 4. GCS verbal subscore is 5. GCS motor subscore is 6.   Skin: Skin is warm and dry. No rash noted. No  erythema.   Psychiatric: She has a normal mood and affect. Her behavior is normal. Judgment and thought content normal.         ED Course   Procedures  Labs Reviewed   CBC W/ AUTO DIFFERENTIAL - Abnormal; Notable for the following components:       Result Value    Hemoglobin 10.4 (*)     Hematocrit 34.6 (*)     MCV 79 (*)     MCH 23.9 (*)     MCHC 30.1 (*)     RDW 14.8 (*)     All other components within normal limits   COMPREHENSIVE METABOLIC PANEL - Abnormal; Notable for the following components:    CO2 22 (*)     ALT 9 (*)     All other components within normal limits   LIPASE   URINALYSIS, REFLEX TO URINE CULTURE    Narrative:     Specimen Source->Urine   C-REACTIVE PROTEIN   C-REACTIVE PROTEIN    Narrative:     add on crp per Kate Barney RN/Ian Murray MD order#   [id number] 04/16/2024 @ 15:24    POCT URINE PREGNANCY          Imaging Results               CT Abdomen Pelvis With IV Contrast NO Oral Contrast (Final result)  Result time 04/16/24 13:30:06      Final result by Kailash Moya MD (04/16/24 13:30:06)                   Impression:      1. Findings concerning for newly developed small-bowel obstruction with transition point seen in the midline upper pelvis, as further discussed above.  The small bowel anastomosis and also ileostomy site are patent and do not appear to be source for suspected obstruction.  The more proximal small bowel loops and stomach are not significantly dilated to suggest high-grade bowel obstruction at this time.  Regional ileus from a nonspecific enteritis could present similarly.  Surgical consultation advised.  2. Reportedly status post hysterectomy with newly developed air-fluid level within the vaginal vault.  This could be related to relatively recent procedural change or intercourse, noting entero-vaginal fistula not excluded in the proper clinical setting.  Clinical correlation advised.  Further evaluation/follow-up as warranted.  3. Surgical changes of prior  partial small-bowel resection and proctocolectomy with right quadrant ileostomy.  4. Right hepatic new peripheral subcentimeter hypoattenuating parenchymal focus which is too small to characterize.  5. Celiac trunk mild-to-moderate stenosis, which can be seen with median arcuate ligament compression syndrome or asymptomatic, but remains patent.  6. Additional stable findings as above.  This report was flagged in Epic as abnormal.      Electronically signed by: Kailash Moya MD  Date:    04/16/2024  Time:    13:30               Narrative:    EXAMINATION:  CT ABDOMEN PELVIS WITH IV CONTRAST    CLINICAL HISTORY:  Bowel obstruction suspected;    TECHNIQUE:  Low dose axial images, sagittal and coronal reformations were obtained from the lung bases to the pubic symphysis following the IV administration of 100 mL of Omnipaque 350 .  Oral contrast was not given.    COMPARISON:  CT abdomen and pelvis 01/19/2024, MRI abdomen 01/04/2024, liver Doppler ultrasound 01/03/2024    FINDINGS:  Partially imaged right breast implant noted.    Imaged lung bases are clear.  Base of the heart is within normal limits.    Liver is normal in size noting small geographic area of suspected focal fatty infiltration at the anterior left lobe.  Subcentimeter hypoattenuating focus at the posterior peripheral aspect of the upper right hepatic lobe not definitively seen on previous imaging, over all too small to adequately characterize.  Portal vasculature is patent.    Very small hiatal hernia.  Gallbladder, pancreas, spleen, stomach, duodenum and bilateral adrenal glands are within normal limits.  No biliary ductal dilatation.    Bilateral kidneys are normal in size and location with symmetric normal enhancement and similar contour.  No hydronephrosis or significant perinephric stranding.  Grossly similar few scattered subcentimeter hypoattenuating parenchymal foci throughout each kidney which are too small to characterize.  Ureters are normal in  course and caliber.  Urinary bladder is suboptimally distended.    Redemonstrated postsurgical changes of total proctocolectomy and partial small-bowel resection with right lower quadrant ileostomy.  Several loops of mildly dilated small bowel with air-fluid levels and scattered areas of mild wall thickening within the right mid to lower abdomen extending into the mid to upper right aspect of the pelvis with transition point seen in the midline upper pelvis best demonstrated on axial images 114 through 119 of series 2, new from prior CT abdomen and pelvis of 01/19/2024, but with similar distribution although slightly less distended from CT abdomen and pelvis 12/31/2023.  The ileostomy appears patent.  There is scattered interloop fluid and venous vascular engorgement involving the distended bowel loops in the right mid to lower abdomen, increased from prior.  The more proximal small bowel loops leading to the right lower quadrant small bowel anastomosis are mostly decompressed, with similar configuration of loops of decompressed small bowel matted in the deep pelvis posteriorly when compared to multiple priors.  Small amount of fecalized small bowel at the level of the ostomy similar to prior.    Postoperative changes of prior hysterectomy.  Newly developed air-fluid level within the vaginal vault, noting several small bowel loops abutting the vaginal cuff region without intervening fat plane.  No adnexal mass.  Pelvic phleboliths noted.    Stable fat density presacral 2.3 cm lesion and similar scattered fat stranding likely postsurgical change within the pelvis.    No upper abdominal ascites.  No free air.  Similar several prominent/mildly enlarged central mesenteric lymph nodes.  No retroperitoneal, pelvic or inguinal lymphadenopathy by CT criteria.    Similar dystrophic calcification within the right gluteal subcutaneous fat.    No significant atherosclerosis.  No aortic aneurysm or dissection.  Grossly similar  mild to moderate narrowing of the celiac trunk, which remains patent.    Osseous structures are stable without acute process seen.                                       Medications   sodium chloride 0.9% bolus 500 mL 500 mL (0 mLs Intravenous Stopped 4/16/24 1300)   morphine injection 4 mg (4 mg Intravenous Given 4/16/24 1233)   metoclopramide injection 10 mg (10 mg Intravenous Given 4/16/24 1233)   iohexoL (OMNIPAQUE 350) injection 100 mL (100 mLs Intravenous Given 4/16/24 1228)   morphine injection 4 mg (4 mg Intravenous Given 4/16/24 1353)     Medical Decision Making  43-year-old female presents with abdominal pain.  Vitals with hypertension.  Physical exam as above.  Labs unremarkable.  CT with likely SBO.  Discussed with CRS who will admit.  Did bedside teaching.  All questions answered.  Patient acknowledges understanding.     Amount and/or Complexity of Data Reviewed  Labs: ordered. Decision-making details documented in ED Course.  Radiology: ordered. Decision-making details documented in ED Course.    Risk  Prescription drug management.  Decision regarding hospitalization.                                      Clinical Impression:  Final diagnoses:  [R10.33] Periumbilical abdominal pain  [R11.0] Nausea  [K56.609] SBO (small bowel obstruction) (Primary)          ED Disposition Condition    Admit Stable                Ian Murray MD  04/16/24 2161

## 2024-04-17 ENCOUNTER — PATIENT MESSAGE (OUTPATIENT)
Dept: INFECTIOUS DISEASES | Facility: HOSPITAL | Age: 44
End: 2024-04-17
Payer: COMMERCIAL

## 2024-04-17 ENCOUNTER — TELEPHONE (OUTPATIENT)
Dept: INFECTIOUS DISEASES | Facility: CLINIC | Age: 44
End: 2024-04-17
Payer: COMMERCIAL

## 2024-04-17 DIAGNOSIS — N39.0 RECURRENT UTI: Primary | ICD-10-CM

## 2024-04-17 PROBLEM — R30.0 DYSURIA: Status: ACTIVE | Noted: 2024-04-17

## 2024-04-17 LAB
ANION GAP SERPL CALC-SCNC: 6 MMOL/L (ref 8–16)
BASOPHILS # BLD AUTO: 0.03 K/UL (ref 0–0.2)
BASOPHILS NFR BLD: 0.6 % (ref 0–1.9)
BUN SERPL-MCNC: 5 MG/DL (ref 6–20)
CALCIUM SERPL-MCNC: 8.5 MG/DL (ref 8.7–10.5)
CHLORIDE SERPL-SCNC: 112 MMOL/L (ref 95–110)
CO2 SERPL-SCNC: 21 MMOL/L (ref 23–29)
CREAT SERPL-MCNC: 0.7 MG/DL (ref 0.5–1.4)
DIFFERENTIAL METHOD BLD: ABNORMAL
EOSINOPHIL # BLD AUTO: 0.2 K/UL (ref 0–0.5)
EOSINOPHIL NFR BLD: 3.1 % (ref 0–8)
ERYTHROCYTE [DISTWIDTH] IN BLOOD BY AUTOMATED COUNT: 14.9 % (ref 11.5–14.5)
EST. GFR  (NO RACE VARIABLE): >60 ML/MIN/1.73 M^2
GLUCOSE SERPL-MCNC: 95 MG/DL (ref 70–110)
HCT VFR BLD AUTO: 35.1 % (ref 37–48.5)
HGB BLD-MCNC: 10.1 G/DL (ref 12–16)
IMM GRANULOCYTES # BLD AUTO: 0 K/UL (ref 0–0.04)
IMM GRANULOCYTES NFR BLD AUTO: 0 % (ref 0–0.5)
LYMPHOCYTES # BLD AUTO: 2.3 K/UL (ref 1–4.8)
LYMPHOCYTES NFR BLD: 46.9 % (ref 18–48)
MCH RBC QN AUTO: 23.6 PG (ref 27–31)
MCHC RBC AUTO-ENTMCNC: 28.8 G/DL (ref 32–36)
MCV RBC AUTO: 82 FL (ref 82–98)
MONOCYTES # BLD AUTO: 0.6 K/UL (ref 0.3–1)
MONOCYTES NFR BLD: 11.6 % (ref 4–15)
NEUTROPHILS # BLD AUTO: 1.8 K/UL (ref 1.8–7.7)
NEUTROPHILS NFR BLD: 37.8 % (ref 38–73)
NRBC BLD-RTO: 0 /100 WBC
PLATELET # BLD AUTO: 288 K/UL (ref 150–450)
PMV BLD AUTO: 9.6 FL (ref 9.2–12.9)
POTASSIUM SERPL-SCNC: 3.6 MMOL/L (ref 3.5–5.1)
RBC # BLD AUTO: 4.28 M/UL (ref 4–5.4)
SODIUM SERPL-SCNC: 139 MMOL/L (ref 136–145)
WBC # BLD AUTO: 4.82 K/UL (ref 3.9–12.7)

## 2024-04-17 PROCEDURE — 99222 1ST HOSP IP/OBS MODERATE 55: CPT | Mod: ,,, | Performed by: INTERNAL MEDICINE

## 2024-04-17 PROCEDURE — 36415 COLL VENOUS BLD VENIPUNCTURE: CPT | Performed by: STUDENT IN AN ORGANIZED HEALTH CARE EDUCATION/TRAINING PROGRAM

## 2024-04-17 PROCEDURE — 25000003 PHARM REV CODE 250: Performed by: NURSE PRACTITIONER

## 2024-04-17 PROCEDURE — 20600001 HC STEP DOWN PRIVATE ROOM

## 2024-04-17 PROCEDURE — 63600175 PHARM REV CODE 636 W HCPCS: Performed by: STUDENT IN AN ORGANIZED HEALTH CARE EDUCATION/TRAINING PROGRAM

## 2024-04-17 PROCEDURE — 25000003 PHARM REV CODE 250: Performed by: STUDENT IN AN ORGANIZED HEALTH CARE EDUCATION/TRAINING PROGRAM

## 2024-04-17 PROCEDURE — 99223 1ST HOSP IP/OBS HIGH 75: CPT | Mod: ,,, | Performed by: INTERNAL MEDICINE

## 2024-04-17 PROCEDURE — 85025 COMPLETE CBC W/AUTO DIFF WBC: CPT | Performed by: STUDENT IN AN ORGANIZED HEALTH CARE EDUCATION/TRAINING PROGRAM

## 2024-04-17 PROCEDURE — 80048 BASIC METABOLIC PNL TOTAL CA: CPT | Performed by: STUDENT IN AN ORGANIZED HEALTH CARE EDUCATION/TRAINING PROGRAM

## 2024-04-17 RX ORDER — CLONAZEPAM 0.5 MG/1
1 TABLET ORAL 2 TIMES DAILY
Status: DISCONTINUED | OUTPATIENT
Start: 2024-04-17 | End: 2024-04-18 | Stop reason: HOSPADM

## 2024-04-17 RX ADMIN — ACETAMINOPHEN 650 MG: 325 TABLET ORAL at 11:04

## 2024-04-17 RX ADMIN — ACETAMINOPHEN 650 MG: 325 TABLET ORAL at 05:04

## 2024-04-17 RX ADMIN — GABAPENTIN 300 MG: 300 CAPSULE ORAL at 08:04

## 2024-04-17 RX ADMIN — OXYCODONE 5 MG: 5 TABLET ORAL at 02:04

## 2024-04-17 RX ADMIN — PANTOPRAZOLE SODIUM 40 MG: 40 TABLET, DELAYED RELEASE ORAL at 08:04

## 2024-04-17 RX ADMIN — PROMETHAZINE HYDROCHLORIDE 25 MG: 12.5 TABLET ORAL at 01:04

## 2024-04-17 RX ADMIN — OXYCODONE 5 MG: 5 TABLET ORAL at 05:04

## 2024-04-17 RX ADMIN — POTASSIUM CHLORIDE, DEXTROSE MONOHYDRATE AND SODIUM CHLORIDE: 150; 5; 450 INJECTION, SOLUTION INTRAVENOUS at 08:04

## 2024-04-17 RX ADMIN — MIRTAZAPINE 30 MG: 15 TABLET, ORALLY DISINTEGRATING ORAL at 08:04

## 2024-04-17 RX ADMIN — OXYCODONE 5 MG: 5 TABLET ORAL at 09:04

## 2024-04-17 RX ADMIN — ENOXAPARIN SODIUM 40 MG: 40 INJECTION SUBCUTANEOUS at 05:04

## 2024-04-17 RX ADMIN — ACETAMINOPHEN 650 MG: 325 TABLET ORAL at 12:04

## 2024-04-17 RX ADMIN — POTASSIUM CHLORIDE, DEXTROSE MONOHYDRATE AND SODIUM CHLORIDE: 150; 5; 450 INJECTION, SOLUTION INTRAVENOUS at 05:04

## 2024-04-17 RX ADMIN — PROCHLORPERAZINE EDISYLATE 5 MG: 5 INJECTION INTRAMUSCULAR; INTRAVENOUS at 05:04

## 2024-04-17 RX ADMIN — CLONAZEPAM 1 MG: 0.5 TABLET ORAL at 08:04

## 2024-04-17 NOTE — HPI
43 F w/ hx of Crohn's disease s/p completion proctocolectomy with end ileostomy 6/2012 admitted for abdominal pain. Hx of multiple recent admissions for sequelae of her Croh's disease (12/2023 and 1/2024). Last dose Entyvio/Vedolizumab 3/11.     CTAP with developed SBO in upper pelvis, distant from anastomosis and ileostomy. UA without pyuria, no leukocytosis or fevers. Currently being managed conservatively, no NGT in place, no current nausea or emesis.     She has a hx of recurrent UTIs. Urinary micro hx:  Klebsiella pneumoniae ESBL 4/2024 (this admission) - susceptible to nitrofurantoin  3/14/24 E fergusonii   5/2022 E coli pan sensitive  3/2022 Klebsiella pneumoniae  She sees urology outpatient, her last cystoscopy was back in 2013 which showed normal mucosa, no sign of fistula and no other obstructive lesions or stones noted. She was previously on Macrobid prophylaxis. Prior biologics include certolizumab pegol. She states she was off biologic therapy for a few years until recently getting back on Entyvio/ Vedolizumab (?4?7 blocker, inhibits lymphocyte requirement into GI tract).. She saw ID outpatient back in 2018, she states she was also unintentional back then, and had multiple episodes of urinary tract infections, but also notes she had some nonobstructing renal stones, and others which required stent placement by Urology to help manage, but denies history of lithotripsy.  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  She is worried because she notices a pattern of recurrent multiple infections whenever she is back on this particular medication for her IBD.  This admission, she states she had dysuria 2-3 days prior to the onset of her abdominal pain and nausea symptoms, for which she took 1-2 doses of Macrobid, she is unsure if this helped significantly but states she may be feeling slightly better.  Denies fevers, rigors, chills, flank pain,  hematuria, change in ostomy output, new sexual partners, genital lesions, or mucopurulent vaginal discharge. ID was consulted to help weigh risk/benefit in continuing her biologic therapy in light of her UTIs and improved control of Crohn's disease while on Vedolizumab

## 2024-04-17 NOTE — PLAN OF CARE
[facility]  Discharge Assessment    Primary Care Provider: Luis Madden, DO     Discharge Assessment (most recent)       BRIEF DISCHARGE ASSESSMENT - 04/17/24 1524          Discharge Planning    Assessment Type Discharge Planning Brief Assessment     Resource/Environmental Concerns none     Support Systems Parent     Equipment Currently Used at Home none     Current Living Arrangements home     Patient/Family Anticipates Transition to home     Patient/Family Anticipated Services at Transition none     DME Needed Upon Discharge  none     Discharge Plan A Home with family     Discharge Plan B Home                         Sophia Quispe LCSW  Case Management/[facility]  [phone]

## 2024-04-17 NOTE — PLAN OF CARE
"[facility] Plan of Care Note    Dx:   Nausea [R11.0]  SBO (small bowel obstruction) [K56.609]  Periumbilical abdominal pain [R10.33]    Shift Events: ambulated to restroom, rested well.     Goals of Care: Pain control    Neuro: AAOx 4    Vital Signs: /63 (BP Location: Right arm, Patient Position: Lying)   Pulse 69   Temp 96 °F (35.6 °C) (Oral)   Resp 18   Ht 5' 1" (1.549 m)   Wt 54.4 kg (120 lb)   LMP 03/30/2015   SpO2 98%   Breastfeeding No   BMI 22.67 kg/m²     Respiratory: wnl     Diet: Diet NPO      Is patient tolerating current diet? npo    GTTS: IVF D5% and 0.45% with KCL 20mEq @75ml/hr    Urine Output/Bowel Movement:   No intake/output data recorded.  Last Bowel Movement: 04/17/24      Drains/Tubes/Tube Feeds (include total output/shift):   No intake/output data recorded.      Lines: Power port      Accuchecks:no    Skin: wnl     Fall Risk Score: 0    Activity level? independent    Any scheduled procedures? None scheduled    Any safety concerns? None     Other: none   "

## 2024-04-17 NOTE — NURSING TRANSFER
Nursing Transfer Note      4/17/2024   12:30 AM    Nurse giving handoff:Katalina    Nurse receiving handoff:MIKE King    Reason patient is being transferred: admit    Transfer From: ER    Transfer via wheelchair    Transfer with IV pole and IVF    Transported by transport    Transfer Vital Signs:  Blood Pressure:119/84  Heart Rate:70  O2:99  Temperature:98.0  Respirations:16    Telemetry: NO  Order for Tele Monitor? No    Additional Lines: No    4eyes on Skin: yes Fanny MCKEON    Medicines sent: no    Any special needs or follow-up needed: joan    Patient belongings transferred with patient: Yes    Chart send with patient: Yes    Notified: pt notified her family     Patient reassessed at: 04/16/2024@ 00:02 (date, time)  1  Upon arrival to floor: patient oriented to room, call bell in reach, and bed in lowest position

## 2024-04-17 NOTE — ASSESSMENT & PLAN NOTE
Ivy Salgado is a 43 y.o. 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 She presents with abdominal pain which started 4/16 with concerns for partial SBO. Now having ostomy output.     PLAN:  - Continue observation  - NPO  - OK for no NGT at this time, discussed with patient symptoms and signs which would require placement of NGT  - Consult IBD GI team; appreciate recs  - Hold home constipating meds  - Continue home meds  - PRN pain control  - Strict I/O including ostomy output

## 2024-04-17 NOTE — SUBJECTIVE & OBJECTIVE
Subjective:     Interval History: Feels much better this am. Pain resolved and now having ostomy output. No n/v.    Post-Op Info:  * No surgery found *          Medications:  Continuous Infusions:  Current Facility-Administered Medications   Medication Dose Route Frequency Provider Last Rate Last Admin    acetaminophen tablet 650 mg  650 mg Oral Q6H Angelo Landaverde MD        albuterol inhaler 2 puff  2 puff Inhalation Q6H PRN Angelo Landaverde MD        dextrose 5 % and 0.45 % NaCl with KCl 20 mEq infusion   Intravenous Continuous Angelo Landaverde MD 75 mL/hr at 04/17/24 0855 New Bag at 04/17/24 0855    diphenhydrAMINE injection 12.5 mg  12.5 mg Intravenous Q4H PRN Angelo Landaverde MD        enoxaparin injection 40 mg  40 mg Subcutaneous Daily Angelo Landaverde MD        gabapentin capsule 300 mg  300 mg Oral BID Angelo Landaverde MD   300 mg at 04/17/24 0855    HYDROmorphone injection 0.2 mg  0.2 mg Intravenous Q2H PRN Angelo Landaverde MD   0.2 mg at 04/16/24 2310    mirtazapine disintegrating tablet 30 mg  30 mg Oral Nightly Angelo Landaverde MD   30 mg at 04/16/24 2318    naloxone 0.4 mg/mL injection 0.02 mg  0.02 mg Intravenous PRN Angelo Landaverde MD        oxyCODONE immediate release tablet 5 mg  5 mg Oral Q4H PRN Angelo Landaverde MD        pantoprazole EC tablet 40 mg  40 mg Oral BID Angelo Landaverde MD   40 mg at 04/17/24 0855    prochlorperazine injection Soln 5 mg  5 mg Intravenous Q6H PRN Angelo Landaverde MD   5 mg at 04/16/24 2314    promethazine tablet 25 mg  25 mg Oral Q6H PRN Angelo Landaverde MD        sodium chloride 0.9% flush 10 mL  10 mL Intravenous PRN Angelo Landaverde MD         Scheduled Meds:  Current Facility-Administered Medications   Medication Dose Route Frequency Provider Last Rate Last Admin    acetaminophen tablet 650 mg  650 mg Oral Q6H Angelo Landaverde MD        albuterol inhaler 2 puff  2 puff Inhalation Q6H PRN Angelo Landaverde MD        dextrose 5 % and 0.45 % NaCl with KCl 20 mEq infusion   Intravenous Continuous Angelo Landaverde,  MD 75 mL/hr at 04/17/24 0855 New Bag at 04/17/24 0855    diphenhydrAMINE injection 12.5 mg  12.5 mg Intravenous Q4H PRN Angelo Landaverde MD        enoxaparin injection 40 mg  40 mg Subcutaneous Daily Angelo Landaverde MD        gabapentin capsule 300 mg  300 mg Oral BID Angelo Landaverde MD   300 mg at 04/17/24 0855    HYDROmorphone injection 0.2 mg  0.2 mg Intravenous Q2H PRN Angelo Landaverde MD   0.2 mg at 04/16/24 2310    mirtazapine disintegrating tablet 30 mg  30 mg Oral Nightly Angelo Landaverde MD   30 mg at 04/16/24 2318    naloxone 0.4 mg/mL injection 0.02 mg  0.02 mg Intravenous PRN Angelo Landaverde MD        oxyCODONE immediate release tablet 5 mg  5 mg Oral Q4H PRN Angelo Landaverde MD        pantoprazole EC tablet 40 mg  40 mg Oral BID Angelo Landaverde MD   40 mg at 04/17/24 0855    prochlorperazine injection Soln 5 mg  5 mg Intravenous Q6H PRN Angelo Landaverde MD   5 mg at 04/16/24 2314    promethazine tablet 25 mg  25 mg Oral Q6H PRN Angelo Landaverde MD        sodium chloride 0.9% flush 10 mL  10 mL Intravenous PRN Angelo Landaverde MD         PRN Meds:  Current Facility-Administered Medications   Medication Dose Route Frequency Provider Last Rate Last Admin    acetaminophen tablet 650 mg  650 mg Oral Q6H Angelo Landaverde MD        albuterol inhaler 2 puff  2 puff Inhalation Q6H PRN Angelo Landaverde MD        dextrose 5 % and 0.45 % NaCl with KCl 20 mEq infusion   Intravenous Continuous Angelo Landaverde MD 75 mL/hr at 04/17/24 0855 New Bag at 04/17/24 0855    diphenhydrAMINE injection 12.5 mg  12.5 mg Intravenous Q4H PRN Angelo Landaverde MD        enoxaparin injection 40 mg  40 mg Subcutaneous Daily Angelo Landaverde MD        gabapentin capsule 300 mg  300 mg Oral BID Angelo Landaverde MD   300 mg at 04/17/24 0855    HYDROmorphone injection 0.2 mg  0.2 mg Intravenous Q2H PRN Angelo Landaverde MD   0.2 mg at 04/16/24 2310    mirtazapine disintegrating tablet 30 mg  30 mg Oral Nightly Angelo Landaverde MD   30 mg at 04/16/24 2318    naloxone 0.4 mg/mL injection 0.02  mg  0.02 mg Intravenous PRN Angelo Landaverde MD        oxyCODONE immediate release tablet 5 mg  5 mg Oral Q4H PRN Angelo Landaverde MD        pantoprazole EC tablet 40 mg  40 mg Oral BID Angelo Landaverde MD   40 mg at 04/17/24 0855    prochlorperazine injection Soln 5 mg  5 mg Intravenous Q6H PRN Angelo Landaverde MD   5 mg at 04/16/24 2314    promethazine tablet 25 mg  25 mg Oral Q6H PRN Angelo Landaverde MD        sodium chloride 0.9% flush 10 mL  10 mL Intravenous PRN Angelo Landaverde MD            Objective:     Vital Signs (Most Recent):  Temp: 97.4 °F (36.3 °C) (04/17/24 0745)  Pulse: 70 (04/17/24 0745)  Resp: 17 (04/17/24 0745)  BP: 118/82 (04/17/24 0745)  SpO2: 99 % (04/17/24 0745) Vital Signs (24h Range):  Temp:  [96 °F (35.6 °C)-98.8 °F (37.1 °C)] 97.4 °F (36.3 °C)  Pulse:  [56-80] 70  Resp:  [15-22] 17  SpO2:  [96 %-99 %] 99 %  BP: (114-140)/(63-84) 118/82     Intake/Output - Last 3 Shifts         04/15 0700  04/16 0659 04/16 0700  04/17 0659 04/17 0700  04/18 0659    IV Piggyback  500     Total Intake(mL/kg)  500 (9.2)     Net  +500            Stool Occurrence  1 x              Physical Exam  Constitutional:       General: She is not in acute distress.     Appearance: She is not ill-appearing.   HENT:      Head: Normocephalic and atraumatic.      Nose: Nose normal.      Mouth/Throat:      Mouth: Mucous membranes are moist.      Pharynx: Oropharynx is clear.   Eyes:      Extraocular Movements: Extraocular movements intact.      Conjunctiva/sclera: Conjunctivae normal.   Cardiovascular:      Rate and Rhythm: Normal rate and regular rhythm.      Pulses: Normal pulses.   Pulmonary:      Effort: Pulmonary effort is normal.   Abdominal:      General: Abdomen is flat. There is no distension.      Palpations: Abdomen is soft.      Tenderness: There is no abdominal tenderness. There is no guarding or rebound.      Comments: Ileostomy with bilious output in bag   Musculoskeletal:         General: Normal range of motion.      Cervical  back: Normal range of motion.   Skin:     General: Skin is warm and dry.      Capillary Refill: Capillary refill takes less than 2 seconds.   Neurological:      Mental Status: She is alert and oriented to person, place, and time.            Significant Labs:  BMP (Last 3 Results):   Recent Labs   Lab 04/16/24  1159 04/17/24  0501   GLU 96 95    139   K 3.7 3.6    112*   CO2 22* 21*   BUN 12 5*   CREATININE 0.7 0.7   CALCIUM 9.4 8.5*     CBC:   Recent Labs   Lab 04/17/24  0501   WBC 4.82   RBC 4.28   HGB 10.1*   HCT 35.1*      MCV 82   MCH 23.6*   MCHC 28.8*       Significant Diagnostics:  I have reviewed all pertinent imaging results/findings within the past 24 hours.

## 2024-04-17 NOTE — CONSULTS
[facility]  Gastroenterology  Consult Note    Patient Name: Ivy Salgado  MRN: [id number]  Admission Date: 4/16/2024  Hospital Length of Stay: 0 days  Code Status: Full Code   Attending Provider: Bob Parsons MD   Consulting Provider: Jaden Lr DO  Primary Care Physician: Luis Madden DO  Principal Problem:<principal problem not specified>    Inpatient consult to Gastroenterology  Consult performed by: Jaden Lr DO  Consult ordered by: Angelo Landaverde MD        Subjective:     HPI: Ivy Salgado is a 43 y.o. 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  [place] [facility] [place] of generalized abdominal pain with nausea and decreased ostomy output of 1 day in duration. IBD/GI consulted for assistance with management. The patient reports that she was in her usual state of health on Monday, 4/15. She consumed some snow crab with a peeled potato and noted that her output changed the next day on 4/16. She presented to work and began to have mid abdominal pain/right sided abdominal pain that radiated across her abdomen. She typically will empty her ostomy 2-3 times/day and the bags are at least 1/2 full. She noted that her output was less and closer to 1/4 full. She did not notice bloody output. She was not passing much gas initially, but since admission with  bowel rest, she has noted more air in her ostomy bag this am. She notes her nausea has subsided. She did not vomit. She did chew her food very well. She notes her abdominal pain is resolved. Denies an appetite, but is willing to try to eat a meal. She denies any fevers or chills.     Previous History:  Ivy Salgado is a 43 y.o. female with Crohn's disease with end ileostomy and recurrent ileitis, recurrent C diff (2014 x 2), ARPITA/depression, arthritis, h/o abnormal pap smear- LYLA I, nephrolithiasis-nonobstructive, GERD, long QTc syndrome (Type III, on nadolol), s/p SHELLIE (2015) for recurrent ovarian cysts and endometriosis, early evolving melanoma in situ (buttocks and para-stomal site, excised in 2018 and 2019 respectively), history of genital HSV, imuran induced pancreatitis, pancreatic tail cyst, recurrent UTI (h/o ESBL 2018), multiple thyroid nodules, osteopenia, left femoral head chronic avascular necrosis, history bacterial vaginosis, h/o abnormal LFTs (elevated ALP since 2016), family history of colon cancer.  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  She tried multiple meds including imuran (pancreatitis, remicade (secondary nonresponder), humira (DIL), MTX (leukopenia).  She met Dr. Cameron initially in GI clinic at [facility] 5/2009 at which time she had some diarrhea and on pred 30 mg/d. Colonoscopy 6/2009 significant for diffuse proctosigmoiditis with remainder of colon normal and end to end ileocolonic anastomsis with inflammation. Placed on rowasa enemas but too expensive so switched to steroid enemas.  Flex sig 10/2009 ongoing proctosigmoiditis.  In 2010 she had rectal bleeding that improved with proctofoam and started cimzia with improvement of symptoms.  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  Colonoscopy c/w moderate proctosigmoiditis and in 5/2010 she continued proctofoam BID and took extra dose of cimzia to induce remission.  In 7/2010 flex sig confirmed active left sided colitis and she started prednisone in 3/2011 with repeat colonoscopy 8/2011 c/w ongoing rectosigmoid inflammation with mild mucosal ulcer the transverse colon with ileocolonic anastomotic stricture and normal TI. In 9/2011 pt was on canasa, cimzia and prednisone taper.  In April/May 2012 pt hospitalized and CT c/w sigmoid wall thickening and colonoscopy confirmed distal 30 cm with moderate inflammation with ileocolonic anastomotic ulcers and normal TI.  In 6/2012 Dr Parsons proceeded with completion proctocolectomy with end ileostomy with pathology confirming transmural active inflammation with abscess c/w Crohn's disease.  Post-operatively she had peristomal ulcer and perineal wounds. In 1/2013 patient had non-healing perineal wound S/P debridement  with steroids and treated with cipro. In 8/2013 she had EUA with debridement and fulguration of non-healing perineal wound.  In 8/2013 pt had epigastric abd pain and EGD c/w benign gastric polyp, labs normal, CT c/w short segment WB thickening involving ileum proximal to the stoma and resolution of right adnexal mass.  In 9/2013 ileoscopy through stoma significant for segment mucosa at 5 cm proximal to stoma mild congested, eroded, ulcerated area of 6-30 cm and mucosal distal is normal. CT A/p 1/2024 c/w 2 separate segments of SB proximal to the ostomy and near staple row in RLQ with mild enhancement and wall thickening c/w mild inflammatory changes and segment of bowel forming ostomy.  SB enteroscopy 2/2014 significant for single 2 mm ulcer in the proximal ileum.  In 2014 she had 2 hospitalizations for high ileostomy output with was treated with prednisone with taper.  In 10/2014 CT  showed few mild dilated loops SB in anterior right pelvis and loop with surgical suture line abuts anterior pelvic wall and possible adhesion. In 10/2014 ileoscopy through stomal normal.  In 1/2015 pt had focal segment of mild distal SB dilation and C diff positive and pt treated antibiotics followed by probiotics and resumed cimzia. Dr. Cameron then referred patient to Dr. Parish Ross in 2015 and he mentions recurrent C diff, recurrent SBOs likely related to adhesions. In 2015 she had SHELLIE/BSO with bladder repair and due to this missed one dose of cimzia and then resumed 5/2015. In 1/2016 she had partial SBO due to adhesions and UGI/SBFT and CT did not show fixed obstruction. In 10/2016 she reported to Dr. Ross postprandial nausea and epigastric pain, weight loss, fatigue, low grade fever and watery stool output and prednisone helped symptoms but not as good response as past. She was off of cimzia 8-9/2016 though sx occurred prior to holding cimzia. Ileoscopy through stoma 10/2016 significant for normal 15 cm of ileum examined. Overall Dr. Ross felt that her symptoms responded well to prednisone and restarting cimzia. She presented to her OV 1/2017 with increased ileostomy output with C diff negative with symptoms ongoing and MRE 5/2017 c/w long segment of diffuse wall thickening and mucosal enhancement involving the distal ileum. CT A/P 6/2017 significant for left ovarian cyst, mild biliary dilation stable, hypodensities and punctate bilateral renal calcifications.  Ileoscopy through stoma 8/2017 c/w normal ileum from stoma to 15-20 cm. CT A/P 11/2017 significant for righ adnexal mass abutting theright external iliac vein and pelvic US recommended, bilateral renal hypodensities and calcifications. MRE 11/2017 c/w mild questionable ileal inflammation and luminal narrowing involving short segment of SB within the right hemipelvis with mild dilation and upstream bowel 2.5 cm with findings concerning for developing  stricture. She stopped cimzia 6/2017 and started stelara 7/11/2017 though discontinued in 1/2018 due to rash.  Due to symptoms pt was started in 1/2018 on prednisone 10 mg/d, bentyl. In 2/2018 CT A/P c/w bilateral nonobstructing renal calculi, mild bilateral cortical scarring of lower renal poles. MRE 9/2018 significant for short segment of distal ileum with scattered regions of non uniform wall thickening and possible additional short segment of proximal jejunum with mild wall thickening. In fall 2018 she was placed on entocort though due to fast transit was opening capsule and taking this. CT A/P 12/2018 c/w several bilateral renal hypodensities c/w cysts, nonobstructive bilateral nephrolithiasis, right adnexal complex cyst. She started entyvio 11/20/18 and due to recurrent UTIs and palpitations (dx QT prolongation and started on beta blocker) so discontinued in 10/27/20. MRE 5/15/19 significant for right adnexal cystic lesion, large left adnexal cystic lesion.  On 5/30/19 patient had exploratory laparotomy with lysis of adhesions, BSO/mass resection. CT A/P 6/2019 significant for nonspecific rim enhancing fluid collection, mild left hydroureteronephrosis. In 2019 there were several mos she held entyvio due to gyn and melanoma surgery. MRE 5/2020 significant for small non enhancing fluid collection within presacral region superior to the vaginal cuff, right sided pelvocaliectasis, bilateral cortical renal cysts, left femoral head chronic avascular necrosis. CT A/P 9/2020 c/w mild left hydrouriteronephrosis due to distal left ureter stone with ass urothelial thickening and enhancement.  MRE 4/2021 right ovarian cyst likely hemorrhagic cyst.  In 1/2022 patient was placed by Dr. Ross on pantoprazole 40 mg BID. For joint pains and chronic abdominal pain Dr. Ross treated her with gabapentin for several years. In 1/2023 she had multiple intrarenal stones.  Repeat CT 10/2023 small non-obstructing renal stones. She was  hospitalized 12/31/23-1/7/24 for high ileostomy output with stool studies neg for infection.  Elevated inflammatory markers (ESR 94, CRP 22.8), stool caprotectin 281. CT A/P showed slow flow through few mid to distal SB loops noting venous vascular engorgement about these loops and wall hyperemia. On 1/3/24 she had EGD c/w mild HP neg gastritis and ileoscopy through stoma with about 6 apthous ulcers in the distal 20 cm of the ileum and biopsies c/w active inflammation.  Pt had elevated LFTs (peaked 1/4/24 ///TB normal).  Hepatology consulted and serological workup negative and subsequent LFTs normalized with no intervention. Abdominal US revealed mildly dilated bile ducts in the central right hepatic lobe and MRCP c/w no evidence of biliary obstruction, punctate cystic focus in the pancreas tail likely side IPMN and f/u in 1 year recommended.  She was treated with antidiarrheals (imodium helped but lomotil caused palpitations) and IVFs. Lotronex was being considered but due to prolonged QT unable to proceed with this. Due to mild ileitis plan for outpatient entyvio.  Since her discharge from hospital she had continued high ileostomy output and dehydration and we recommended that she return for hospitalization but pt did not wish to go to ER.  She continued with high ileostomy output up to 8 liters/day but if fasting down to 4 liters/day despiate taking imodium 1 tab QID.  She is not taking lomotil due to palpitations.  At her OV 1/16/24 due to high ileostomy output and dehydration I proceeded with hospital admission 1/16/24-1/27/24 at which time she continued on IVFs, antidiarrheals, pain meds and started on IV solumedrol with repeat stool calprotectin done on 1/16/24 93.9 though unclear accuracy given some granulation tissue on stoma could influence this test. On 1/24/24 stool calprotectin 117.6, ileoscopy through stoma with one small apthous ulcer 19 cm proximal to stoma. She had stool studies  neg for infection and then discharged home on liquid imodium, pred 40 mg with taper and plan to start entyvio.      Interval History:  - current IBD meds:  Entyvio q 8 weeks (18- 10/27/20- stopped due to UTIs and palpitations, reinduced 24, LD 3/11, ND )  - recent IBD meds:  prednisone 40 mg/d (30 mg/d tomorrow and then plan on 20 mg/d , 10 mg/d 2/10, off 2/15)  - antidiarrheals: liquid imodium 2 mg 2-3 times/d  - 3/14/24 urine cultures: Escherichia ferusoni >100,000 cfu/ml, UTI- currently on keflex- has about 2-3 days left   - 24 urine cultures: ESBL Klebsiella PNA, s/p Macrobid   - ileostomy output: 3 full bags, variable consistency  - NSAID use: No  - Narcotic use: No  - Alternative/complementary meds for IBD: No     Prior Pertinent Surgeries:   surgery with SB and 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       Last pertinent Endoscopy/Imagin23 CT A/P slow flow through few mid to distal SB loops noting venous vascular engorgement about these loops and wall hyperemia  1/3/24 EGD: normal except for gastric erythema, bx c/w mild HP neg gastritis   1/3/24 ileoscopy through stoma:  6 apthous ulcers in the distal 20 cm of the ileum and biopsies c/w active inflammation  1/3/24 abd US:  mildly dilated bile ducts in the central right hepatic lobe   24 MRCP c/w no evidence of biliary obstruction, punctate cystic focus in the pancreas tail likely side IPMN  24 CT A/P with contrast- Postsurgical changes of colectomy and partial small-bowel resection with right lower quadrant ileostomy. Overall stable findings with a few loops of small bowel demonstrating mild  bowel wall thickening likely due to under distension. No convincing evidence acute findings.   1/24/24- Ileoscopy- A single (solitary) ulcer in the area at 19 cm proximal to the stoma. Biopsied.  Focal mild to moderate chronic ileitis with focal minimal acute inflammation; consistent with chronic Crohn's ileitis with minimal activity. No evidence of granulomatous inflammation, crypt abscess formation, dysplasia, or malignancy.   4/16/24 CT A/P with contrast- Findings concerning for newly developed small-bowel obstruction with transition point seen in the midline upper pelvis, as further discussed above. The small bowel anastomosis and also ileostomy site are patent and do not appear to be source for suspected obstruction. The more proximal small bowel loops and stomach are not significantly dilated to suggest high-grade bowel obstruction at this time. Regional ileus from a nonspecific enteritis could present similarly.      Therapeutic Drug Monitoring Labs:  None     Prior IBD Therapies:  Proctofoam - partially effective  imuran (pancreatitis)  MTX (leukopenia)  Remicade- secondary nonresponder  Humira- discontinued due to drug induced lupus due to joint pains  Entocort- fall 2018, broke open capsule when taking- not sure if effective   Cimzia 2082-3847- secondary nonresponder  Stelara (7/11/17-1/2018)- discontinued due to rash  Canasa ineffective   Prednisone- effective- last took 1-2/2024    Past Medical History:   Diagnosis Date    Abnormal Pap smear 2007    Alkaline phosphatase elevation- based on lab from 4/9/2019 05/28/2019    Arthritis     Avascular necrosis of bone of hip, left     Bacterial vaginosis     Depression 08/05/2017    Drug-induced pancreatitis (imuran)     Generalized anxiety disorder     Genital HSV     GERD (gastroesophageal reflux disease)     Hypertension     Ileostomy in place 07/09/2012    Kidney stone     Long QT syndrome     Melanoma in situ (excised-buttocks 2018, para-stomal site 2019)      Moderate episode of recurrent major depressive disorder 02/02/2024    Multiple thyroid nodules 11/27/2018    Osteopenia of multiple sites 01/07/2019    Pancreas cyst (tail, 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)     Recurrent Clostridioides difficile diarrhea     Recurrent UTI 04/03/2013       Past Surgical History:   Procedure Laterality Date    ABDOMINAL SURGERY      APPENDECTOMY      ARTHROPLASTY OF SHOULDER Left 7/18/2023    Procedure: ARTHROPLASTY, SHOULDER;  Surgeon: Sharon Babb MD;  Location: [facility] OR;  Service: Orthopedics;  Laterality: Left;    AUGMENTATION OF BREAST Bilateral 06/2022    gel implants    BILATERAL SALPINGO-OOPHORECTOMY (BSO) Bilateral 05/30/2019    Procedure: SALPINGO-OOPHORECTOMY, BILATERAL;  Surgeon: Rupa German MD;  Location: [facility] OR 2ND FLR;  Service: OB/GYN;  Laterality: Bilateral;    BLADDER SURGERY      partial cystectomy due to fistula    breast lift      BREAST SURGERY      [facility]      COLON SURGERY      COLONOSCOPY      CYSTOSCOPY  09/23/2020    Procedure: CYSTOSCOPY;  Surgeon: Sascha Florentino MD;  [facility];  Service: Urology;;    CYSTOSCOPY W/ URETERAL STENT PLACEMENT Left 09/15/2020    Procedure: CYSTOSCOPY, WITH URETERAL STENT INSERTION;  Surgeon: Sascha Florentino MD;  [facility];  Service: Urology;  Laterality: Left;    DIAGNOSTIC LAPAROSCOPY N/A 07/09/2020    Procedure: LAPAROSCOPY, DIAGNOSTIC;  Surgeon: Gilson El MD;  Location: [facility] OR;  Service: OB/GYN;  Laterality: N/A;    ESOPHAGOGASTRODUODENOSCOPY N/A 1/3/2024    Procedure: EGD (ESOPHAGOGASTRODUODENOSCOPY);  Surgeon: Lily Lind MD;  Location: [facility] ENDO (2ND FLR);  Service: Endoscopy;  Laterality: N/A;    EXCISION OF MELANOMA  07/17/2019    ILEOSCOPY N/A 1/3/2024    Procedure: ILEOSCOPY;  Surgeon: iLly Lind MD;  Location: [facility] ENDO (2ND FLR);  Service: Endoscopy;  Laterality: N/A;    ILEOSCOPY N/A 1/24/2024    Procedure: ILEOSCOPY;  Surgeon: Lilian Navarro MD;  Location:  [facility] ENDO (2ND FLR);  Service: Endoscopy;  Laterality: N/A;  through stoma    ILEOSTOMY      LAPAROSCOPIC LYSIS OF ADHESIONS N/A 07/09/2020    Procedure: LYSIS, ADHESIONS, LAPAROSCOPIC;  Surgeon: Gilson El MD;  Location: [facility];  Service: OB/GYN;  Laterality: N/A;    LASER LITHOTRIPSY  09/23/2020    Procedure: LITHOTRIPSY, USING LASER;  Surgeon: Sascha Florentino MD;  Location: [facility] OR 1ST FLR;  Service: Urology;;    LYSIS OF ADHESIONS N/A 05/30/2019    Procedure: LYSIS, ADHESIONS;  Surgeon: Rupa German MD;  Location: [facility] OR 2ND FLR;  Service: OB/GYN;  Laterality: N/A;    OOPHORECTOMY Right 04/16/2015    PORTACATH PLACEMENT  02/21/2017    SKIN BIOPSY      SMALL INTESTINE SURGERY      age 18 Y    TOTAL ABDOMINAL HYSTERECTOMY  04/16/2015    TOTAL COLECTOMY      TUBAL LIGATION  06/06/2012    UPPER GASTROINTESTINAL ENDOSCOPY      URETEROSCOPIC REMOVAL OF URETERIC CALCULUS  09/23/2020    Procedure: REMOVAL, CALCULUS, URETER, URETEROSCOPIC;  Surgeon: Sascha Florentino MD;  Location: [facility] OR [address] [place] [address];  Service: Urology;;    URETEROSCOPY Left 09/23/2020    Procedure: URETEROSCOPY;  Surgeon: Sascha Florentino MD;  [facility];  Service: Urology;  Laterality: Left;       Family History   Problem Relation Name Age of Onset    Diabetes Paternal Grandfather Stephen     Hearing loss Paternal Grandmother Viviane     Cancer Maternal Grandfather Philippe         Skin    Skin cancer Maternal Grandfather Philippe     Diabetes Maternal Grandfather Philippe     Heart disease Maternal Grandfather Philippe     Colon cancer Father Milan     Cancer Father Milan         Colon    Liver cancer Father Milan     Hyperlipidemia Father Milan     Hypertension Mother Shaila     Crohn's disease Brother      Endometrial cancer Maternal Aunt      Breast cancer Maternal Cousin  41    Crohn's disease Daughter      Ovarian cancer Neg Hx      Melanoma Neg Hx         Social History     Socioeconomic History    Marital  status: Single   Occupational History     Employer: [employer]   Tobacco Use    Smoking status: Never    Smokeless tobacco: Never   Substance and Sexual Activity    Alcohol use: Not Currently     Alcohol/week: 0.0 standard drinks of alcohol    Drug use: No    Sexual activity: Yes     Partners: Male     Birth control/protection: See Surgical Hx     Comment: HYST   Other Topics Concern    Are you pregnant or think you may be? No    Breast-feeding No     Social Determinants of Health     Financial Resource Strain: Low Risk  (1/17/2024)    Overall Financial Resource Strain (CARDIA)     Difficulty of Paying Living Expenses: Not hard at all   Recent Concern: Financial Resource Strain - High Risk (10/24/2023)    Overall Financial Resource Strain (CARDIA)     Difficulty of Paying Living Expenses: Hard   Food Insecurity: No Food Insecurity (1/17/2024)    Hunger Vital Sign     Worried About Running Out of Food in the Last Year: Never true     Ran Out of Food in the Last Year: Never true   Recent Concern: Food Insecurity - Food Insecurity Present (10/24/2023)    Hunger Vital Sign     Worried About Running Out of Food in the Last Year: Sometimes true     Ran Out of Food in the Last Year: Sometimes true   Transportation Needs: No Transportation Needs (1/17/2024)    PRAPARE - Transportation     Lack of Transportation (Medical): No     Lack of Transportation (Non-Medical): No   Recent Concern: Transportation Needs - Unmet Transportation Needs (10/24/2023)    PRAPARE - Transportation     Lack of Transportation (Medical): Yes     Lack of Transportation (Non-Medical): Yes   Physical Activity: Insufficiently Active (1/17/2024)    Exercise Vital Sign     Days of Exercise per Week: 3 days     Minutes of Exercise per Session: 30 min   Stress: No Stress Concern Present (1/17/2024)    [employer] [place] [employer] - Occupational Stress Questionnaire     Feeling of Stress : Not at all   Recent Concern: Stress -  Stress Concern Present (10/24/2023)    [place] [place] [employer] - Occupational Stress Questionnaire     Feeling of Stress : Rather much   Social Connections: Unknown (1/17/2024)    Social Connection and Isolation Panel [NHANES]     Frequency of Communication with Friends and Family: More than three times a week     Frequency of Social Gatherings with Friends and Family: More than three times a week     Attends [employer] Services: Patient declined     Active Member of Clubs or Organizations: Patient declined     Attends Club or Organization Meetings: Patient declined     Marital Status: Never    Housing Stability: Low Risk  (1/17/2024)    Housing Stability Vital Sign     Unable to Pay for Housing in the Last Year: No     Number of Places Lived in the Last Year: 1     Unstable Housing in the Last Year: No   Recent Concern: Housing Stability - High Risk (10/24/2023)    Housing Stability Vital Sign     Unable to Pay for Housing in the Last Year: Yes     Number of Places Lived in the Last Year: 2     Unstable Housing in the Last Year: No       Current Facility-Administered Medications on File Prior to Encounter   Medication Dose Route Frequency Provider Last Rate Last Admin    estradiol valerate (DELESTROGEN) injection 20 mg/mL  20 mg Intramuscular Q30 Days Gilson El MD   20 mg at 12/30/22 0902     Current Outpatient Medications on File Prior to Encounter   Medication Sig Dispense Refill    albuterol (VENTOLIN HFA) 90 mcg/actuation inhaler Inhale 2 puffs into the lungs every 6 (six) hours as needed. Rescue (Patient not taking: Reported on 4/15/2024) 18 g 0    clonazePAM (KLONOPIN) 1 MG tablet Take 1 tablet (1 mg total) by mouth 2 (two) times daily. 60 tablet 2    cyclobenzaprine (FLEXERIL) 10 MG tablet Take 1 tablet (10 mg total) by mouth every evening as needed for muscle pain 30 tablet 0    diphenoxylate-atropine 2.5-0.025 mg/5 ml (LOMOTIL) 2.5-0.025 mg/5 mL liquid Take 2.5-5 ml up to 4  times a day - dose might change at visit on 1/30 (Patient not taking: Reported on 4/15/2024) 300 mL 0    droNABinol (MARINOL) 5 MG capsule Take 1 capsule (5 mg total) by mouth 2 (two) times daily before meals. 60 capsule 1    estradiol 0.025 mg/24 hr 0.025 mg/24 hr Place 1 patch onto the skin once a week. 4 patch 11    gabapentin (NEURONTIN) 300 MG capsule Take 1 capsule (300 mg total) by mouth 2 (two) times daily. 60 capsule 5    ipratropium (ATROVENT) 21 mcg (0.03 %) nasal spray use 2 sprays by Each Nostril route 2 (two) times daily as needed. (Patient not taking: Reported on 4/15/2024) 30 mL 0    loperamide (IMODIUM) 1 mg/7.5 mL solution Take 4 mg by mouth 3 (three) times daily as needed for Diarrhea.      mirtazapine (REMERON SOL-TAB) 30 MG disintegrating tablet Take 1 tablet (30 mg total) by mouth nightly. 30 tablet 0    multivitamin (THERAGRAN) per tablet Take 1 tablet by mouth once daily.      nadoloL (CORGARD) 40 MG tablet Take 1 tablet (40 mg total) by mouth once daily. Patient needs appointment before next refill. 90 tablet 7    nitrofurantoin, macrocrystal-monohydrate, (MACROBID) 100 MG capsule Take 1 capsule (100 mg total) by mouth 2 (two) times daily. for 7 days 14 capsule 0    pantoprazole (PROTONIX) 40 MG tablet Take 1 tablet (40 mg total) by mouth 2 (two) times daily. 60 tablet 12    tamsulosin (FLOMAX) 0.4 mg Cap Take 1 capsule (0.4 mg total) by mouth once daily. (Patient not taking: Reported on 4/15/2024) 30 capsule 1    valACYclovir (VALTREX) 500 MG tablet Take 1 tablet (500 mg total) by mouth once daily. 90 tablet 3    vedolizumab (ENTYVIO) 300 mg SolR injection Inject 300 mg into the vein every 8 weeks.      zolpidem (AMBIEN) 10 mg Tab Take 1 tablet (10 mg total) by mouth every evening. 30 tablet 2       Review of patient's allergies indicates:   Allergen Reactions    Azathioprine sodium Other (See Comments)     Other reaction(s): pancreatitis  Other reaction(s): pancreatitis    Methotrexate  analogues Other (See Comments)     leukopenia    Stelara [ustekinumab] Other (See Comments)     Multiple infections    Zofran [ondansetron hcl (pf)] Other (See Comments)     Per patient causes prolong QT    Vancomycin analogues Other (See Comments)     Made her red    Azathioprine      Other reaction(s): Unknown    Methotrexate      Other reaction(s): infection-    Morphine Itching and Other (See Comments)     Other reaction(s): Itching    Zofran [ondansetron hcl]      Other reaction(s): Hives    Bactrim [sulfamethoxazole-trimethoprim] Palpitations    Ciprofloxacin Palpitations       Review of Systems   Constitutional:  Positive for malaise/fatigue. Negative for chills and fever.   Respiratory:  Negative for cough and shortness of breath.    Cardiovascular:  Negative for chest pain and palpitations.   Gastrointestinal:  Positive for abdominal pain and nausea. Negative for blood in stool, constipation, diarrhea and vomiting.   Genitourinary:  Negative for dysuria and frequency.   Musculoskeletal:  Negative for back pain and myalgias.   Skin:  Negative for itching and rash.   Neurological:  Negative for dizziness and headaches.      Objective:     Vitals:    04/16/24 2310   BP:    Pulse:    Resp: 16   Temp:      Constitutional:  not in acute distress and well developed  HENT: Head: Normal, normocephalic, atraumatic.  Eyes: conjunctiva clear  Cardiovascular: regular rate and rhythm  Respiratory: normal chest expansion & respiratory effort   and no accessory muscle use  GI: soft, nondistended, tenderness mild in the entire abdomen, without guarding, and without rebound  Musculoskeletal: no muscular tenderness noted  Skin: normal color  Neurological: alert, oriented x3  Psychiatric: mood and affect are within normal limits    Significant Labs:  Recent Labs   Lab 04/16/24  1159   HGB 10.4*       Lab Results   Component Value Date    WBC 8.67 04/16/2024    HGB 10.4 (L) 04/16/2024    HCT 34.6 (L) 04/16/2024    MCV 79 (L)  04/16/2024     04/16/2024       Lab Results   Component Value Date     04/16/2024    K 3.7 04/16/2024     04/16/2024    CO2 22 (L) 04/16/2024    BUN 12 04/16/2024    CREATININE 0.7 04/16/2024    CALCIUM 9.4 04/16/2024    ANIONGAP 10 04/16/2024    ESTGFRAFRICA >60.0 03/24/2022    EGFRNONAA >60.0 03/24/2022       Lab Results   Component Value Date    ALT 9 (L) 04/16/2024    AST 17 04/16/2024    ALKPHOS 91 04/16/2024    BILITOT 0.3 04/16/2024       Lab Results   Component Value Date    INR 1.0 01/10/2024    INR 1.0 01/07/2024    INR 1.0 01/06/2024       Significant Imaging:  Reviewed pertinent radiology findings.       Assessment/Plan:     Ivy Salgado is a 43 y.o. 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  She presents to [facility] for evaluation of generalized abdominal pain with nausea and decreased ostomy output of 1 day in duration. IBD/GI consulted for assistance with management.     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 Fergusonii on 3/14/24)   Partial Small Bowel Obstruction     Recommendations:  - Recommend inpatient consult with transplant ID; discussed case with Dr. Moser, appreciate input into recurrent UTI's in the setting of Entyvio use for management of Crohn's   - Appreciate excellent care as provided by the CRS team, can advance diet to low fiber/ low residue. If patient is tolerating diet, she may follow up outpatient   - Would recommend obtaining outpatient MRE +/- MRI of the pelvis; Dr. Garcia will coordinate   - Please avoid NSAID's  - Please ensure the patient is on DVT ppx; Lovenox ordered   - If narcotics are needed, please use morphine; this is the preferred agent in IBD given its short acting nature     Thank you for involving us in the care of Ivy Salgado. Please call with any additional questions, concerns or changes in the patient's clinical status. We will continue to follow.     Jaden Lr DO  Gastroenterology Fellow PGY- IV  [facility]

## 2024-04-17 NOTE — SUBJECTIVE & OBJECTIVE
Past Medical History:   Diagnosis Date    Abnormal Pap smear 2007    Alkaline phosphatase elevation- based on lab from 4/9/2019 05/28/2019    Arthritis     Avascular necrosis of bone of hip, left     Bacterial vaginosis     Depression 08/05/2017    Drug-induced pancreatitis (imuran)     Generalized anxiety disorder     Genital HSV     GERD (gastroesophageal reflux disease)     Hypertension     Ileostomy in place 07/09/2012    Kidney stone     Long QT syndrome     Melanoma in situ (excised-buttocks 2018, para-stomal site 2019)     Moderate episode of recurrent major depressive disorder 02/02/2024    Multiple thyroid nodules 11/27/2018    Osteopenia of multiple sites 01/07/2019    Pancreas cyst (tail, 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)     Recurrent Clostridioides difficile diarrhea     Recurrent UTI 04/03/2013       Past Surgical History:   Procedure Laterality Date    ABDOMINAL SURGERY      APPENDECTOMY      ARTHROPLASTY OF SHOULDER Left 7/18/2023    Procedure: ARTHROPLASTY, SHOULDER;  Surgeon: Sharon Babb MD;  Location: [facility];  Service: Orthopedics;  Laterality: Left;    AUGMENTATION OF BREAST Bilateral 06/2022    gel implants    BILATERAL SALPINGO-OOPHORECTOMY (BSO) Bilateral 05/30/2019    Procedure: SALPINGO-OOPHORECTOMY, BILATERAL;  Surgeon: Rupa German MD;  Location: [facility] OR [address] [place] [address];  Service: OB/GYN;  Laterality: Bilateral;    BLADDER SURGERY      partial cystectomy due to fistula    breast lift      BREAST SURGERY      [facility]      COLON SURGERY      COLONOSCOPY      CYSTOSCOPY  09/23/2020    Procedure: CYSTOSCOPY;  Surgeon: Sascha Florentino MD;  Location: [facility] OR [address] [place] [address];  Service: Urology;;    CYSTOSCOPY W/ URETERAL STENT PLACEMENT Left 09/15/2020    Procedure: CYSTOSCOPY, WITH URETERAL STENT INSERTION;  Surgeon: Sascha Florentino MD;  Location: [facility] OR [address] [place] [address];  Service: Urology;  Laterality: Left;    DIAGNOSTIC LAPAROSCOPY N/A 07/09/2020    Procedure: LAPAROSCOPY, DIAGNOSTIC;  Surgeon: Gilson El MD;   Location: [facility] OR;  Service: OB/GYN;  Laterality: N/A;    ESOPHAGOGASTRODUODENOSCOPY N/A 1/3/2024    Procedure: EGD (ESOPHAGOGASTRODUODENOSCOPY);  Surgeon: Lily Lind MD;  Location: [facility] ENDO (2ND FLR);  Service: Endoscopy;  Laterality: N/A;    EXCISION OF MELANOMA  07/17/2019    ILEOSCOPY N/A 1/3/2024    Procedure: ILEOSCOPY;  Surgeon: Lily Lind MD;  Location: [facility] ENDO (2ND FLR);  Service: Endoscopy;  Laterality: N/A;    ILEOSCOPY N/A 1/24/2024    Procedure: ILEOSCOPY;  Surgeon: Lilian Navarro MD;  Location: [facility] ENDO (2ND FLR);  Service: Endoscopy;  Laterality: N/A;  through stoma    ILEOSTOMY      LAPAROSCOPIC LYSIS OF ADHESIONS N/A 07/09/2020    Procedure: LYSIS, ADHESIONS, LAPAROSCOPIC;  Surgeon: Gilson El MD;  Location: [facility] OR;  Service: OB/GYN;  Laterality: N/A;    LASER LITHOTRIPSY  09/23/2020    Procedure: LITHOTRIPSY, USING LASER;  Surgeon: Sascha Florentino MD;  Location: [facility] OR 1ST FLR;  Service: Urology;;    LYSIS OF ADHESIONS N/A 05/30/2019    Procedure: LYSIS, ADHESIONS;  Surgeon: Rupa German MD;  Location: [facility] OR 2ND FLR;  Service: OB/GYN;  Laterality: N/A;    OOPHORECTOMY Right 04/16/2015    PORTACATH PLACEMENT  02/21/2017    SKIN BIOPSY      SMALL INTESTINE SURGERY      age 18 Y    TOTAL ABDOMINAL HYSTERECTOMY  04/16/2015    TOTAL COLECTOMY      TUBAL LIGATION  06/06/2012    UPPER GASTROINTESTINAL ENDOSCOPY      URETEROSCOPIC REMOVAL OF URETERIC CALCULUS  09/23/2020    Procedure: REMOVAL, CALCULUS, URETER, URETEROSCOPIC;  Surgeon: Sascha Florentino MD;  Location: [facility] OR 1ST FLR;  Service: Urology;;    URETEROSCOPY Left 09/23/2020    Procedure: URETEROSCOPY;  Surgeon: Sascha Florentino MD;  Location: [facility] OR 1ST FLR;  Service: Urology;  Laterality: Left;       Review of patient's allergies indicates:   Allergen Reactions    Azathioprine sodium Other (See Comments)     Other reaction(s): pancreatitis  Other reaction(s): pancreatitis    Methotrexate  analogues Other (See Comments)     leukopenia    Stelara [ustekinumab] Other (See Comments)     Multiple infections    Zofran [ondansetron hcl (pf)] Other (See Comments)     Per patient causes prolong QT    Vancomycin analogues Other (See Comments)     Made her red    Azathioprine      Other reaction(s): Unknown    Methotrexate      Other reaction(s): infection-    Morphine Itching and Other (See Comments)     Other reaction(s): Itching    Zofran [ondansetron hcl]      Other reaction(s): Hives    Bactrim [sulfamethoxazole-trimethoprim] Palpitations    Ciprofloxacin Palpitations       Medications:  Current Facility-Administered Medications   Medication Dose Route Frequency Provider Last Rate Last Admin    acetaminophen tablet 650 mg  650 mg Oral Q6H Angelo Landaverde MD   650 mg at 04/17/24 1225    albuterol inhaler 2 puff  2 puff Inhalation Q6H PRN Angelo Landaverde MD        clonazePAM tablet 1 mg  1 mg Oral BID Vania Moeller NP        dextrose 5 % and 0.45 % NaCl with KCl 20 mEq infusion   Intravenous Continuous Angelo Landaverde MD 75 mL/hr at 04/17/24 0855 New Bag at 04/17/24 0855    diphenhydrAMINE injection 12.5 mg  12.5 mg Intravenous Q4H PRN Angelo Landaverde MD        enoxaparin injection 40 mg  40 mg Subcutaneous Daily Angelo Landaverde MD        gabapentin capsule 300 mg  300 mg Oral BID Angelo Landaverde MD   300 mg at 04/17/24 0855    HYDROmorphone injection 0.2 mg  0.2 mg Intravenous Q2H PRN Angelo Landaverde MD   0.2 mg at 04/16/24 2310    mirtazapine disintegrating tablet 30 mg  30 mg Oral Nightly Angelo Landaverde MD   30 mg at 04/16/24 2318    naloxone 0.4 mg/mL injection 0.02 mg  0.02 mg Intravenous PRN Angelo Landaverde MD        oxyCODONE immediate release tablet 5 mg  5 mg Oral Q4H PRN Angelo Landaverde MD   5 mg at 04/17/24 1402    pantoprazole EC tablet 40 mg  40 mg Oral BID Angelo Landaverde MD   40 mg at 04/17/24 0855    prochlorperazine injection Soln 5 mg  5 mg Intravenous Q6H PRN Angelo Landaverde MD   5 mg at 04/16/24 5088     promethazine tablet 25 mg  25 mg Oral Q6H PRN Angelo Landaverde MD   25 mg at 04/17/24 1353    sodium chloride 0.9% flush 10 mL  10 mL Intravenous PRN Angelo Landaverde MD         Antibiotics (From admission, onward)      None          Antifungals (From admission, onward)      None          Antivirals (From admission, onward)      None             Immunization History   Administered Date(s) Administered    COVID-19, MRNA, LN-S, PF (Pfizer) (Purple Cap) 12/21/2020, 01/13/2021, 09/07/2021    Hepatitis A, Adult 02/12/2024    Hepatitis B (recombinant) Adjuvanted, 2 dose 12/26/2019, 01/23/2020    Influenza 09/18/2013, 10/25/2022    Influenza - Quadrivalent - PF *Preferred* (6 months and older) 09/18/2013, 10/04/2016, 11/17/2017, 09/13/2018, 10/02/2019, 09/10/2020, 09/24/2021, 10/25/2022, 10/09/2023    Influenza Split 09/18/2013    Pneumococcal Conjugate - 13 Valent 06/28/2017    Pneumococcal Conjugate - 20 Valent 01/30/2024    Pneumococcal Polysaccharide - 23 Valent 08/19/2015    Td (ADULT) 06/28/2013    Tdap 12/29/2019       Family History       Problem Relation (Age of Onset)    Breast cancer Maternal Cousin (41)    Cancer Maternal Grandfather, Father    Colon cancer Father    Crohn's disease Brother, Daughter    Diabetes Paternal Grandfather, Maternal Grandfather    Endometrial cancer Maternal Aunt    Hearing loss Paternal Grandmother    Heart disease Maternal Grandfather    Hyperlipidemia Father    Hypertension Mother    Liver cancer Father    Skin cancer Maternal Grandfather          Social History     Socioeconomic History    Marital status: Single   Occupational History     Employer: [employer]   Tobacco Use    Smoking status: Never    Smokeless tobacco: Never   Substance and Sexual Activity    Alcohol use: Not Currently     Alcohol/week: 0.0 standard drinks of alcohol    Drug use: No    Sexual activity: Yes     Partners: Male     Birth control/protection: See Surgical Hx     Comment: HYST   Other Topics  Concern    Are you pregnant or think you may be? No    Breast-feeding No     Social Determinants of Health     Financial Resource Strain: Low Risk  (1/17/2024)    Overall Financial Resource Strain (CARDIA)     Difficulty of Paying Living Expenses: Not hard at all   Recent Concern: Financial Resource Strain - High Risk (10/24/2023)    Overall Financial Resource Strain (CARDIA)     Difficulty of Paying Living Expenses: Hard   Food Insecurity: No Food Insecurity (1/17/2024)    Hunger Vital Sign     Worried About Running Out of Food in the Last Year: Never true     Ran Out of Food in the Last Year: Never true   Recent Concern: Food Insecurity - Food Insecurity Present (10/24/2023)    Hunger Vital Sign     Worried About Running Out of Food in the Last Year: Sometimes true     Ran Out of Food in the Last Year: Sometimes true   Transportation Needs: No Transportation Needs (1/17/2024)    PRAPARE - Transportation     Lack of Transportation (Medical): No     Lack of Transportation (Non-Medical): No   Recent Concern: Transportation Needs - Unmet Transportation Needs (10/24/2023)    PRAPARE - Transportation     Lack of Transportation (Medical): Yes     Lack of Transportation (Non-Medical): Yes   Physical Activity: Insufficiently Active (1/17/2024)    Exercise Vital Sign     Days of Exercise per Week: 3 days     Minutes of Exercise per Session: 30 min   Stress: No Stress Concern Present (1/17/2024)    [employer] [place] [employer] - Occupational Stress Questionnaire     Feeling of Stress : Not at all   Recent Concern: Stress - Stress Concern Present (10/24/2023)    [employer] [place] [employer] - Occupational Stress Questionnaire     Feeling of Stress : Rather much   Social Connections: Unknown (1/17/2024)    Social Connection and Isolation Panel [NHANES]     Frequency of Communication with Friends and Family: More than three times a week     Frequency of Social Gatherings with Friends and Family: More  than three times a week     Attends Jewish Services: Patient declined     Active Member of Clubs or Organizations: Patient declined     Attends Club or Organization Meetings: Patient declined     Marital Status: Never    Housing Stability: Low Risk  (1/17/2024)    Housing Stability Vital Sign     Unable to Pay for Housing in the Last Year: No     Number of Places Lived in the Last Year: 1     Unstable Housing in the Last Year: No   Recent Concern: Housing Stability - High Risk (10/24/2023)    Housing Stability Vital Sign     Unable to Pay for Housing in the Last Year: Yes     Number of Places Lived in the Last Year: 2     Unstable Housing in the Last Year: No     Review of Systems   Constitutional:  Positive for fatigue. Negative for chills and fever.   HENT:  Negative for trouble swallowing.    Respiratory:  Negative for cough and shortness of breath.    Gastrointestinal:  Positive for abdominal pain. Negative for abdominal distention, blood in stool, diarrhea and vomiting.   Genitourinary:  Positive for dysuria. Negative for hematuria.   Musculoskeletal:  Negative for back pain.   Skin:  Negative for wound.   Allergic/Immunologic: Positive for immunocompromised state.   Psychiatric/Behavioral:  Negative for confusion.      Objective:     Vital Signs (Most Recent):  Temp: 98.1 °F (36.7 °C) (04/17/24 1517)  Pulse: 69 (04/17/24 1517)  Resp: 18 (04/17/24 1517)  BP: 127/74 (04/17/24 1517)  SpO2: 100 % (04/17/24 1517) Vital Signs (24h Range):  Temp:  [96 °F (35.6 °C)-98.7 °F (37.1 °C)] 98.1 °F (36.7 °C)  Pulse:  [56-80] 69  Resp:  [16-22] 18  SpO2:  [96 %-100 %] 100 %  BP: (116-127)/(63-84) 127/74     Weight: 54.4 kg (120 lb)  Body mass index is 22.67 kg/m².    Estimated Creatinine Clearance: 79.8 mL/min (based on SCr of 0.7 mg/dL).     Physical Exam  Constitutional:       Appearance: Normal appearance. She is not ill-appearing or toxic-appearing.   HENT:      Head: Normocephalic.      Nose: Nose normal.    Eyes:      Conjunctiva/sclera: Conjunctivae normal.   Cardiovascular:      Rate and Rhythm: Normal rate and regular rhythm.      Pulses: Normal pulses.      Heart sounds: Normal heart sounds.   Pulmonary:      Effort: Pulmonary effort is normal.      Breath sounds: Normal breath sounds.   Abdominal:      General: Bowel sounds are normal.      Palpations: Abdomen is soft.      Tenderness: There is no guarding or rebound.      Comments: Ostomy with stool contents, site c/d/i   Musculoskeletal:         General: No deformity.      Cervical back: Neck supple.   Skin:     General: Skin is warm and dry.      Comments: Left chest wall central line c/d/i   Neurological:      Mental Status: She is alert and oriented to person, place, and time. Mental status is at baseline.          Significant Labs:   Microbiology Results (last 7 days)       ** No results found for the last 168 hours. **          Recent Lab Results         04/17/24  0501        Anion Gap 6       Baso # 0.03       Basophil % 0.6       BUN 5       Calcium 8.5       Chloride 112       CO2 21       Creatinine 0.7       Differential Method Automated       eGFR >60.0       Eos # 0.2       Eos % 3.1       Glucose 95       Gran # (ANC) 1.8       Gran % 37.8       Hematocrit 35.1       Hemoglobin 10.1       Immature Grans (Abs) 0.00  Comment: Mild elevation in immature granulocytes is non specific and   can be seen in a variety of conditions including stress response,   acute inflammation, trauma and pregnancy. Correlation with other   laboratory and clinical findings is essential.         Immature Granulocytes 0.0       Lymph # 2.3       Lymph % 46.9       MCH 23.6       MCHC 28.8       MCV 82       Mono # 0.6       Mono % 11.6       MPV 9.6       nRBC 0       Platelet Count 288       Potassium 3.6       RBC 4.28       RDW 14.9       Sodium 139       WBC 4.82               Significant Imaging: I have reviewed all pertinent imaging results/findings within the past  24 hours.

## 2024-04-17 NOTE — CONSULTS
[facility]  Infectious Disease  Consult Note    Patient Name: Ivy Salgado  MRN: [id number]  Admission Date: 4/16/2024  Hospital Length of Stay: 1 days  Attending Physician: Bob Parsons MD  Primary Care Provider: Luis Madden DO     Isolation Status: No active isolations    Patient information was obtained from patient and ER records.      Inpatient consult to Infectious Diseases  Consult performed by: Yair Gtz MD  Consult ordered by: Jaden Lr DO        Assessment/Plan:     Renal/  Dysuria  43 F w/ hx of Crohn's disease s/p completion proctocolectomy with end ileostomy 6/2012 admitted for abdominal pain, hx of multiple recent admissions for sequelae of her Crohn's disease (12/2023 and 1/2024). Last dose Entyvio/Vedolizumab 3/11.     CTAP with developed SBO in upper pelvis, distant from anastomosis and ileostomy. UA without pyuria, no leukocytosis or fevers. Clinically stable from an infectious standpoint.     Patient has a history of recurrent UTIs, she is concerned being on Vedolizumab is exacerbating her risk of such episodes. Currently with improved symptoms, last ucx w/ ESBL klebsiella. Extensive discussion at bedside weighting benefits and risks of biologic therapy and UTI risk association.       Recommendations    Shared clinical decision making: Discussed risks of infection associated with being on biologics, no particular evidence based association for increased UTI, Vedolizumb increases risk of infection like any other immunosuppressive biologic would.     Risk/benefit profile favors remaining on therapy with Vedolizumab, and mitigating risk of UTI as much as possible, patient chose not to opt for more abx at this time given no strong evidence of current UTI    In addition to post coital voiding, and vitamin C, recommend starting methenamine/ sodium hippurate 1 g twice daily orally ( do not take during infection/cystitis episodes)     Discussed the above with team  and patient.     GI  Crohn's disease of ileum with end ileostomy  See as above/below        Thank you for your consult. I will sign off. Please contact us if you have any additional questions.    Yair Gtz MD  Infectious Disease  [facility]    Subjective:     Principal Problem: <principal problem not specified>    HPI: 43 F w/ hx of Crohn's disease s/p completion proctocolectomy with end ileostomy 6/2012 admitted for abdominal pain. Hx of multiple recent admissions for sequelae of her Croh's disease (12/2023 and 1/2024). Last dose Entyvio/Vedolizumab 3/11.     CTAP with developed SBO in upper pelvis, distant from anastomosis and ileostomy. UA without pyuria, no leukocytosis or fevers. Currently being managed conservatively, no NGT in place, no current nausea or emesis.     She has a hx of recurrent UTIs. Urinary micro hx:  Klebsiella pneumoniae ESBL 4/2024 (this admission) - susceptible to nitrofurantoin  3/14/24 E fergusonii   5/2022 E coli pan sensitive  3/2022 Klebsiella pneumoniae  She sees urology outpatient, her last cystoscopy was back in 2013 which showed normal mucosa, no sign of fistula and no other obstructive lesions or stones noted. She was previously on Macrobid prophylaxis. Prior biologics include certolizumab pegol. She states she was off biologic therapy for a few years until recently getting back on Entyvio/ Vedolizumab (?4?7 blocker, inhibits lymphocyte requirement into GI tract).. She saw ID outpatient back in 2018, she states she was also unintentional back then, and had multiple episodes of urinary tract infections, but also notes she had some nonobstructing renal stones, and others which required stent placement by Urology to help manage, but denies history of lithotripsy.  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  She is worried because she notices a pattern of recurrent multiple infections whenever she is back on  this particular medication for her IBD.  This admission, she states she had dysuria 2-3 days prior to the onset of her abdominal pain and nausea symptoms, for which she took 1-2 doses of Macrobid, she is unsure if this helped significantly but states she may be feeling slightly better.  Denies fevers, rigors, chills, flank pain, hematuria, change in ostomy output, new sexual partners, genital lesions, or mucopurulent vaginal discharge. ID was consulted to help weigh risk/benefit in continuing her biologic therapy in light of her UTIs and improved control of Crohn's disease while on Vedolizumab    Past Medical History:   Diagnosis Date    Abnormal Pap smear 2007    Alkaline phosphatase elevation- based on lab from 4/9/2019 05/28/2019    Arthritis     Avascular necrosis of bone of hip, left     Bacterial vaginosis     Depression 08/05/2017    Drug-induced pancreatitis (imuran)     Generalized anxiety disorder     Genital HSV     GERD (gastroesophageal reflux disease)     Hypertension     Ileostomy in place 07/09/2012    Kidney stone     Long QT syndrome     Melanoma in situ (excised-buttocks 2018, para-stomal site 2019)     Moderate episode of recurrent major depressive disorder 02/02/2024    Multiple thyroid nodules 11/27/2018    Osteopenia of multiple sites 01/07/2019    Pancreas cyst (tail, 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)     Recurrent Clostridioides difficile diarrhea     Recurrent UTI 04/03/2013       Past Surgical History:   Procedure Laterality Date    ABDOMINAL SURGERY      APPENDECTOMY      ARTHROPLASTY OF SHOULDER Left 7/18/2023    Procedure: ARTHROPLASTY, SHOULDER;  Surgeon: Sharon Babb MD;  Location: [facility];  Service: Orthopedics;  Laterality: Left;    AUGMENTATION OF BREAST Bilateral 06/2022    gel implants    BILATERAL SALPINGO-OOPHORECTOMY (BSO) Bilateral 05/30/2019    Procedure: SALPINGO-OOPHORECTOMY, BILATERAL;  Surgeon: Rupa German MD;  Location: [address];  Service: OB/GYN;  Laterality: Bilateral;     BLADDER SURGERY      partial cystectomy due to fistula    breast lift      BREAST SURGERY      [facility]      COLON SURGERY      COLONOSCOPY      CYSTOSCOPY  09/23/2020    Procedure: CYSTOSCOPY;  Surgeon: Sascha Florentino MD;  Location: [facility] OR 1ST FLR;  Service: Urology;;    CYSTOSCOPY W/ URETERAL STENT PLACEMENT Left 09/15/2020    Procedure: CYSTOSCOPY, WITH URETERAL STENT INSERTION;  Surgeon: Sascha Florentino MD;  Location: [facility] OR 1ST FLR;  Service: Urology;  Laterality: Left;    DIAGNOSTIC LAPAROSCOPY N/A 07/09/2020    Procedure: LAPAROSCOPY, DIAGNOSTIC;  Surgeon: Gilson El MD;  Location: [facility] OR;  Service: OB/GYN;  Laterality: N/A;    ESOPHAGOGASTRODUODENOSCOPY N/A 1/3/2024    Procedure: EGD (ESOPHAGOGASTRODUODENOSCOPY);  Surgeon: Lily Lind MD;  Location: [facility] (2ND FLR);  Service: Endoscopy;  Laterality: N/A;    EXCISION OF MELANOMA  07/17/2019    ILEOSCOPY N/A 1/3/2024    Procedure: ILEOSCOPY;  Surgeon: Lily Lind MD;  Location: [facility] ENDO (2ND FLR);  Service: Endoscopy;  Laterality: N/A;    ILEOSCOPY N/A 1/24/2024    Procedure: ILEOSCOPY;  Surgeon: Lilian Navarro MD;  Location: [facility] ENDO (2ND FLR);  Service: Endoscopy;  Laterality: N/A;  through stoma    ILEOSTOMY      LAPAROSCOPIC LYSIS OF ADHESIONS N/A 07/09/2020    Procedure: LYSIS, ADHESIONS, LAPAROSCOPIC;  Surgeon: Gilson El MD;  Location: [facility] OR;  Service: OB/GYN;  Laterality: N/A;    LASER LITHOTRIPSY  09/23/2020    Procedure: LITHOTRIPSY, USING LASER;  Surgeon: Sascha Florentino MD;  Location: [facility] OR 1ST FLR;  Service: Urology;;    LYSIS OF ADHESIONS N/A 05/30/2019    Procedure: LYSIS, ADHESIONS;  Surgeon: Rupa German MD;  Location: [facility] OR 2ND FLR;  Service: OB/GYN;  Laterality: N/A;    OOPHORECTOMY Right 04/16/2015    PORTACATH PLACEMENT  02/21/2017    SKIN BIOPSY      SMALL INTESTINE SURGERY      age 18 Y    TOTAL ABDOMINAL HYSTERECTOMY  04/16/2015    TOTAL COLECTOMY      TUBAL LIGATION   06/06/2012    UPPER GASTROINTESTINAL ENDOSCOPY      URETEROSCOPIC REMOVAL OF URETERIC CALCULUS  09/23/2020    Procedure: REMOVAL, CALCULUS, URETER, URETEROSCOPIC;  Surgeon: Sascha Florentino MD;  Location: [facility] OR [address] [place] [address];  Service: Urology;;    URETEROSCOPY Left 09/23/2020    Procedure: URETEROSCOPY;  Surgeon: Sascha Florentino MD;  [facility];  Service: Urology;  Laterality: Left;       Review of patient's allergies indicates:   Allergen Reactions    Azathioprine sodium Other (See Comments)     Other reaction(s): pancreatitis  Other reaction(s): pancreatitis    Methotrexate analogues Other (See Comments)     leukopenia    Stelara [ustekinumab] Other (See Comments)     Multiple infections    Zofran [ondansetron hcl (pf)] Other (See Comments)     Per patient causes prolong QT    Vancomycin analogues Other (See Comments)     Made her red    Azathioprine      Other reaction(s): Unknown    Methotrexate      Other reaction(s): infection-    Morphine Itching and Other (See Comments)     Other reaction(s): Itching    Zofran [ondansetron hcl]      Other reaction(s): Hives    Bactrim [sulfamethoxazole-trimethoprim] Palpitations    Ciprofloxacin Palpitations       Medications:  Current Facility-Administered Medications   Medication Dose Route Frequency Provider Last Rate Last Admin    acetaminophen tablet 650 mg  650 mg Oral Q6H Angelo Landaverde MD   650 mg at 04/17/24 1225    albuterol inhaler 2 puff  2 puff Inhalation Q6H PRN Angelo Landaverde MD        clonazePAM tablet 1 mg  1 mg Oral BID Vania Moeller NP        dextrose 5 % and 0.45 % NaCl with KCl 20 mEq infusion   Intravenous Continuous Angelo Landaverde MD 75 mL/hr at 04/17/24 0855 New Bag at 04/17/24 0855    diphenhydrAMINE injection 12.5 mg  12.5 mg Intravenous Q4H PRN nAgelo Landaverde MD        enoxaparin injection 40 mg  40 mg Subcutaneous Daily Angelo Landaverde MD        gabapentin capsule 300 mg  300 mg Oral BID Angelo Landaverde MD   300 mg at 04/17/24  0855    HYDROmorphone injection 0.2 mg  0.2 mg Intravenous Q2H PRN Angelo Landaverde MD   0.2 mg at 04/16/24 2310    mirtazapine disintegrating tablet 30 mg  30 mg Oral Nightly Angelo Landaverde MD   30 mg at 04/16/24 2318    naloxone 0.4 mg/mL injection 0.02 mg  0.02 mg Intravenous PRN Angelo Landaverde MD        oxyCODONE immediate release tablet 5 mg  5 mg Oral Q4H PRN Angelo Landaverde MD   5 mg at 04/17/24 1402    pantoprazole EC tablet 40 mg  40 mg Oral BID Angelo Landaverde MD   40 mg at 04/17/24 0855    prochlorperazine injection Soln 5 mg  5 mg Intravenous Q6H PRN Angelo Landaverde MD   5 mg at 04/16/24 2314    promethazine tablet 25 mg  25 mg Oral Q6H PRN Angelo Landaverde MD   25 mg at 04/17/24 1353    sodium chloride 0.9% flush 10 mL  10 mL Intravenous PRN Angelo Landaverde MD         Antibiotics (From admission, onward)      None          Antifungals (From admission, onward)      None          Antivirals (From admission, onward)      None             Immunization History   Administered Date(s) Administered    COVID-19, MRNA, LN-S, PF (Pfizer) (Purple Cap) 12/21/2020, 01/13/2021, 09/07/2021    Hepatitis A, Adult 02/12/2024    Hepatitis B (recombinant) Adjuvanted, 2 dose 12/26/2019, 01/23/2020    Influenza 09/18/2013, 10/25/2022    Influenza - Quadrivalent - PF *Preferred* (6 months and older) 09/18/2013, 10/04/2016, 11/17/2017, 09/13/2018, 10/02/2019, 09/10/2020, 09/24/2021, 10/25/2022, 10/09/2023    Influenza Split 09/18/2013    Pneumococcal Conjugate - 13 Valent 06/28/2017    Pneumococcal Conjugate - 20 Valent 01/30/2024    Pneumococcal Polysaccharide - 23 Valent 08/19/2015    Td (ADULT) 06/28/2013    Tdap 12/29/2019       Family History       Problem Relation (Age of Onset)    Breast cancer Maternal Cousin (41)    Cancer Maternal Grandfather, Father    Colon cancer Father    Crohn's disease Brother, Daughter    Diabetes Paternal Grandfather, Maternal Grandfather    Endometrial cancer Maternal Aunt    Hearing loss Paternal  Grandmother    Heart disease Maternal Grandfather    Hyperlipidemia Father    Hypertension Mother    Liver cancer Father    Skin cancer Maternal Grandfather          Social History     Socioeconomic History    Marital status: Single   Occupational History     Employer: [employer]   Tobacco Use    Smoking status: Never    Smokeless tobacco: Never   Substance and Sexual Activity    Alcohol use: Not Currently     Alcohol/week: 0.0 standard drinks of alcohol    Drug use: No    Sexual activity: Yes     Partners: Male     Birth control/protection: See Surgical Hx     Comment: HYST   Other Topics Concern    Are you pregnant or think you may be? No    Breast-feeding No     Social Determinants of Health     Financial Resource Strain: Low Risk  (1/17/2024)    Overall Financial Resource Strain (CARDIA)     Difficulty of Paying Living Expenses: Not hard at all   Recent Concern: Financial Resource Strain - High Risk (10/24/2023)    Overall Financial Resource Strain (CARDIA)     Difficulty of Paying Living Expenses: Hard   Food Insecurity: No Food Insecurity (1/17/2024)    Hunger Vital Sign     Worried About Running Out of Food in the Last Year: Never true     Ran Out of Food in the Last Year: Never true   Recent Concern: Food Insecurity - Food Insecurity Present (10/24/2023)    Hunger Vital Sign     Worried About Running Out of Food in the Last Year: Sometimes true     Ran Out of Food in the Last Year: Sometimes true   Transportation Needs: No Transportation Needs (1/17/2024)    PRAPARE - Transportation     Lack of Transportation (Medical): No     Lack of Transportation (Non-Medical): No   Recent Concern: Transportation Needs - Unmet Transportation Needs (10/24/2023)    PRAPARE - Transportation     Lack of Transportation (Medical): Yes     Lack of Transportation (Non-Medical): Yes   Physical Activity: Insufficiently Active (1/17/2024)    Exercise Vital Sign     Days of Exercise per Week: 3 days     Minutes of  Exercise per Session: 30 min   Stress: No Stress Concern Present (1/17/2024)    [employer] [place] [employer] - Occupational Stress Questionnaire     Feeling of Stress : Not at all   Recent Concern: Stress - Stress Concern Present (10/24/2023)    [place] [place] [employer] - Occupational Stress Questionnaire     Feeling of Stress : Rather much   Social Connections: Unknown (1/17/2024)    Social Connection and Isolation Panel [NHANES]     Frequency of Communication with Friends and Family: More than three times a week     Frequency of Social Gatherings with Friends and Family: More than three times a week     Attends Tenriism Services: Patient declined     Active Member of Clubs or Organizations: Patient declined     Attends Club or Organization Meetings: Patient declined     Marital Status: Never    Housing Stability: Low Risk  (1/17/2024)    Housing Stability Vital Sign     Unable to Pay for Housing in the Last Year: No     Number of Places Lived in the Last Year: 1     Unstable Housing in the Last Year: No   Recent Concern: Housing Stability - High Risk (10/24/2023)    Housing Stability Vital Sign     Unable to Pay for Housing in the Last Year: Yes     Number of Places Lived in the Last Year: 2     Unstable Housing in the Last Year: No     Review of Systems   Constitutional:  Positive for fatigue. Negative for chills and fever.   HENT:  Negative for trouble swallowing.    Respiratory:  Negative for cough and shortness of breath.    Gastrointestinal:  Positive for abdominal pain. Negative for abdominal distention, blood in stool, diarrhea and vomiting.   Genitourinary:  Positive for dysuria. Negative for hematuria.   Musculoskeletal:  Negative for back pain.   Skin:  Negative for wound.   Allergic/Immunologic: Positive for immunocompromised state.   Psychiatric/Behavioral:  Negative for confusion.      Objective:     Vital Signs (Most Recent):  Temp: 98.1 °F (36.7 °C) (04/17/24  1517)  Pulse: 69 (04/17/24 1517)  Resp: 18 (04/17/24 1517)  BP: 127/74 (04/17/24 1517)  SpO2: 100 % (04/17/24 1517) Vital Signs (24h Range):  Temp:  [96 °F (35.6 °C)-98.7 °F (37.1 °C)] 98.1 °F (36.7 °C)  Pulse:  [56-80] 69  Resp:  [16-22] 18  SpO2:  [96 %-100 %] 100 %  BP: (116-127)/(63-84) 127/74     Weight: 54.4 kg (120 lb)  Body mass index is 22.67 kg/m².    Estimated Creatinine Clearance: 79.8 mL/min (based on SCr of 0.7 mg/dL).     Physical Exam  Constitutional:       Appearance: Normal appearance. She is not ill-appearing or toxic-appearing.   HENT:      Head: Normocephalic.      Nose: Nose normal.   Eyes:      Conjunctiva/sclera: Conjunctivae normal.   Cardiovascular:      Rate and Rhythm: Normal rate and regular rhythm.      Pulses: Normal pulses.      Heart sounds: Normal heart sounds.   Pulmonary:      Effort: Pulmonary effort is normal.      Breath sounds: Normal breath sounds.   Abdominal:      General: Bowel sounds are normal.      Palpations: Abdomen is soft.      Tenderness: There is no guarding or rebound.      Comments: Ostomy with stool contents, site c/d/i   Musculoskeletal:         General: No deformity.      Cervical back: Neck supple.   Skin:     General: Skin is warm and dry.      Comments: Left chest wall central line c/d/i   Neurological:      Mental Status: She is alert and oriented to person, place, and time. Mental status is at baseline.          Significant Labs:   Microbiology Results (last 7 days)       ** No results found for the last 168 hours. **          Recent Lab Results         04/17/24  0501        Anion Gap 6       Baso # 0.03       Basophil % 0.6       BUN 5       Calcium 8.5       Chloride 112       CO2 21       Creatinine 0.7       Differential Method Automated       eGFR >60.0       Eos # 0.2       Eos % 3.1       Glucose 95       Gran # (ANC) 1.8       Gran % 37.8       Hematocrit 35.1       Hemoglobin 10.1       Immature Grans (Abs) 0.00  Comment: Mild elevation in  immature granulocytes is non specific and   can be seen in a variety of conditions including stress response,   acute inflammation, trauma and pregnancy. Correlation with other   laboratory and clinical findings is essential.         Immature Granulocytes 0.0       Lymph # 2.3       Lymph % 46.9       MCH 23.6       MCHC 28.8       MCV 82       Mono # 0.6       Mono % 11.6       MPV 9.6       nRBC 0       Platelet Count 288       Potassium 3.6       RBC 4.28       RDW 14.9       Sodium 139       WBC 4.82               Significant Imaging: I have reviewed all pertinent imaging results/findings within the past 24 hours.

## 2024-04-17 NOTE — ASSESSMENT & PLAN NOTE
43 F w/ hx of Crohn's disease s/p completion proctocolectomy with end ileostomy 6/2012 admitted for abdominal pain, hx of multiple recent admissions for sequelae of her Crohn's disease (12/2023 and 1/2024). Last dose Entyvio/Vedolizumab 3/11.     CTAP with developed SBO in upper pelvis, distant from anastomosis and ileostomy. UA without pyuria, no leukocytosis or fevers. Clinically stable from an infectious standpoint.     Patient has a history of recurrent UTIs, she is concerned being on Vedolizumab is exacerbating her risk of such episodes. Currently with improved symptoms, last ucx w/ ESBL klebsiella. Extensive discussion at bedside weighting benefits and risks of biologic therapy and UTI risk association.       Recommendations    Shared clinical decision making: Discussed risks of infection associated with being on biologics, no particular evidence based association for increased UTI, Vedolizumb increases risk of infection like any other immunosuppressive biologic would.     Risk/benefit profile favors remaining on therapy with Vedolizumab, and mitigating risk of UTI as much as possible, patient chose not to opt for more abx at this time given no strong evidence of current UTI    In addition to post coital voiding, and vitamin C, recommend starting methenamine/ sodium hippurate 1 g twice daily orally ( do not take during infection/cystitis episodes)     Discussed the above with team and patient.

## 2024-04-17 NOTE — ED NOTES
Assumed care of patient and have received report from am nurse. Patient has been identified and correct.     Triage note:  Chief Complaint   Patient presents with    Abdominal Pain     RLQ abdominal pain and nausea since this AM. Hx of chrons w/ ileostomy and appendectomy.      Review of patient's allergies indicates:   Allergen Reactions    Azathioprine sodium Other (See Comments)     Other reaction(s): pancreatitis  Other reaction(s): pancreatitis    Methotrexate analogues Other (See Comments)     leukopenia    Stelara [ustekinumab] Other (See Comments)     Multiple infections    Zofran [ondansetron hcl (pf)] Other (See Comments)     Per patient causes prolong QT    Vancomycin analogues Other (See Comments)     Made her red    Azathioprine      Other reaction(s): Unknown    Methotrexate      Other reaction(s): infection-    Morphine Itching and Other (See Comments)     Other reaction(s): Itching    Zofran [ondansetron hcl]      Other reaction(s): Hives    Bactrim [sulfamethoxazole-trimethoprim] Palpitations    Ciprofloxacin Palpitations     Past Medical History:   Diagnosis Date    Abnormal Pap smear 2007    Alkaline phosphatase elevation- based on lab from 4/9/2019 05/28/2019    Arthritis     Avascular necrosis of bone of hip, left     Bacterial vaginosis     Depression 08/05/2017    Drug-induced pancreatitis (imuran)     Generalized anxiety disorder     Genital HSV     GERD (gastroesophageal reflux disease)     Hypertension     Ileostomy in place 07/09/2012    Kidney stone     Long QT syndrome     Melanoma in situ (excised-buttocks 2018, para-stomal site 2019)     Moderate episode of recurrent major depressive disorder 02/02/2024    Multiple thyroid nodules 11/27/2018    Osteopenia of multiple sites 01/07/2019    Pancreas cyst (tail, 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)     Recurrent Clostridioides difficile diarrhea     Recurrent UTI 04/03/2013

## 2024-04-17 NOTE — PROGRESS NOTES
[facility]  Colorectal Surgery  Progress Note    Patient Name: Ivy Salgado  MRN: [id number]  Admission Date: 4/16/2024  Hospital Length of Stay: 1 days  Attending Physician: Bob Parsons MD    Subjective:     Interval History: Feels much better this am. Pain resolved and now having ostomy output. No n/v.    Post-Op Info:  * No surgery found *          Medications:  Continuous Infusions:  Current Facility-Administered Medications   Medication Dose Route Frequency Provider Last Rate Last Admin    acetaminophen tablet 650 mg  650 mg Oral Q6H Angelo Landaverde MD        albuterol inhaler 2 puff  2 puff Inhalation Q6H PRN Angelo Landaverde MD        dextrose 5 % and 0.45 % NaCl with KCl 20 mEq infusion   Intravenous Continuous Angelo Landaverde MD 75 mL/hr at 04/17/24 0855 New Bag at 04/17/24 0855    diphenhydrAMINE injection 12.5 mg  12.5 mg Intravenous Q4H PRN Angelo Landaverde MD        enoxaparin injection 40 mg  40 mg Subcutaneous Daily Angelo Landaverde MD        gabapentin capsule 300 mg  300 mg Oral BID Angelo Landaverde MD   300 mg at 04/17/24 0855    HYDROmorphone injection 0.2 mg  0.2 mg Intravenous Q2H PRN Angelo Landaverde MD   0.2 mg at 04/16/24 2310    mirtazapine disintegrating tablet 30 mg  30 mg Oral Nightly Angelo Landaverde MD   30 mg at 04/16/24 2318    naloxone 0.4 mg/mL injection 0.02 mg  0.02 mg Intravenous PRN Angelo Landaverde MD        oxyCODONE immediate release tablet 5 mg  5 mg Oral Q4H PRN Angelo Landaverde MD        pantoprazole EC tablet 40 mg  40 mg Oral BID Angelo Landaverde MD   40 mg at 04/17/24 0855    prochlorperazine injection Soln 5 mg  5 mg Intravenous Q6H PRN Angelo Landaverde MD   5 mg at 04/16/24 2314    promethazine tablet 25 mg  25 mg Oral Q6H PRN Angelo Landaverde MD        sodium chloride 0.9% flush 10 mL  10 mL Intravenous PRN Angelo Landaverde MD         Scheduled Meds:  Current Facility-Administered Medications   Medication Dose Route Frequency Provider Last Rate Last Admin    acetaminophen tablet 650 mg  650  mg Oral Q6H Angelo Landaverde MD        albuterol inhaler 2 puff  2 puff Inhalation Q6H PRN Angelo Landaverde MD        dextrose 5 % and 0.45 % NaCl with KCl 20 mEq infusion   Intravenous Continuous Angelo Landaverde MD 75 mL/hr at 04/17/24 0855 New Bag at 04/17/24 0855    diphenhydrAMINE injection 12.5 mg  12.5 mg Intravenous Q4H PRN Angelo Landaverde MD        enoxaparin injection 40 mg  40 mg Subcutaneous Daily Angelo Landaverde MD        gabapentin capsule 300 mg  300 mg Oral BID Angelo Landaverde MD   300 mg at 04/17/24 0855    HYDROmorphone injection 0.2 mg  0.2 mg Intravenous Q2H PRN Angelo Landaverde MD   0.2 mg at 04/16/24 2310    mirtazapine disintegrating tablet 30 mg  30 mg Oral Nightly Angelo Landaverde MD   30 mg at 04/16/24 2318    naloxone 0.4 mg/mL injection 0.02 mg  0.02 mg Intravenous PRN Angelo Landaverde MD        oxyCODONE immediate release tablet 5 mg  5 mg Oral Q4H PRN Angelo Landaverde MD        pantoprazole EC tablet 40 mg  40 mg Oral BID Angelo Landaverde MD   40 mg at 04/17/24 0855    prochlorperazine injection Soln 5 mg  5 mg Intravenous Q6H PRN Angelo Landaverde MD   5 mg at 04/16/24 2314    promethazine tablet 25 mg  25 mg Oral Q6H PRN Angelo Landaverde MD        sodium chloride 0.9% flush 10 mL  10 mL Intravenous PRN Angelo Landaverde MD         PRN Meds:  Current Facility-Administered Medications   Medication Dose Route Frequency Provider Last Rate Last Admin    acetaminophen tablet 650 mg  650 mg Oral Q6H Angelo Landaverde MD        albuterol inhaler 2 puff  2 puff Inhalation Q6H PRN Angelo Landaverde MD dextrose 5 % and 0.45 % NaCl with KCl 20 mEq infusion   Intravenous Continuous Angelo Landaverde MD 75 mL/hr at 04/17/24 0855 New Bag at 04/17/24 0855    diphenhydrAMINE injection 12.5 mg  12.5 mg Intravenous Q4H PRN Angelo Landaverde MD        enoxaparin injection 40 mg  40 mg Subcutaneous Daily Angelo Landaverde MD        gabapentin capsule 300 mg  300 mg Oral BID Angelo Landaverde MD   300 mg at 04/17/24 0855    HYDROmorphone injection 0.2 mg  0.2 mg  Intravenous Q2H PRN Angelo Landaverde MD   0.2 mg at 04/16/24 2310    mirtazapine disintegrating tablet 30 mg  30 mg Oral Nightly Angelo Landaverde MD   30 mg at 04/16/24 2318    naloxone 0.4 mg/mL injection 0.02 mg  0.02 mg Intravenous PRN Angelo Landaverde MD        oxyCODONE immediate release tablet 5 mg  5 mg Oral Q4H PRN Angelo Landaverde MD        pantoprazole EC tablet 40 mg  40 mg Oral BID Angelo Landaverde MD   40 mg at 04/17/24 0855    prochlorperazine injection Soln 5 mg  5 mg Intravenous Q6H PRN Angelo Landaverde MD   5 mg at 04/16/24 2314    promethazine tablet 25 mg  25 mg Oral Q6H PRN Angelo Landaverde MD        sodium chloride 0.9% flush 10 mL  10 mL Intravenous PRN Angelo Landaverde MD            Objective:     Vital Signs (Most Recent):  Temp: 97.4 °F (36.3 °C) (04/17/24 0745)  Pulse: 70 (04/17/24 0745)  Resp: 17 (04/17/24 0745)  BP: 118/82 (04/17/24 0745)  SpO2: 99 % (04/17/24 0745) Vital Signs (24h Range):  Temp:  [96 °F (35.6 °C)-98.8 °F (37.1 °C)] 97.4 °F (36.3 °C)  Pulse:  [56-80] 70  Resp:  [15-22] 17  SpO2:  [96 %-99 %] 99 %  BP: (114-140)/(63-84) 118/82     Intake/Output - Last 3 Shifts         04/15 0700  04/16 0659 04/16 0700 04/17 0659 04/17 0700  04/18 0659    IV Piggyback  500     Total Intake(mL/kg)  500 (9.2)     Net  +500            Stool Occurrence  1 x              Physical Exam  Constitutional:       General: She is not in acute distress.     Appearance: She is not ill-appearing.   HENT:      Head: Normocephalic and atraumatic.      Nose: Nose normal.      Mouth/Throat:      Mouth: Mucous membranes are moist.      Pharynx: Oropharynx is clear.   Eyes:      Extraocular Movements: Extraocular movements intact.      Conjunctiva/sclera: Conjunctivae normal.   Cardiovascular:      Rate and Rhythm: Normal rate and regular rhythm.      Pulses: Normal pulses.   Pulmonary:      Effort: Pulmonary effort is normal.   Abdominal:      General: Abdomen is flat. There is no distension.      Palpations: Abdomen is soft.       Tenderness: There is no abdominal tenderness. There is no guarding or rebound.      Comments: Ileostomy with bilious output in bag   Musculoskeletal:         General: Normal range of motion.      Cervical back: Normal range of motion.   Skin:     General: Skin is warm and dry.      Capillary Refill: Capillary refill takes less than 2 seconds.   Neurological:      Mental Status: She is alert and oriented to person, place, and time.            Significant Labs:  BMP (Last 3 Results):   Recent Labs   Lab 04/16/24  1159 04/17/24  0501   GLU 96 95    139   K 3.7 3.6    112*   CO2 22* 21*   BUN 12 5*   CREATININE 0.7 0.7   CALCIUM 9.4 8.5*     CBC:   Recent Labs   Lab 04/17/24  0501   WBC 4.82   RBC 4.28   HGB 10.1*   HCT 35.1*      MCV 82   MCH 23.6*   MCHC 28.8*       Significant Diagnostics:  I have reviewed all pertinent imaging results/findings within the past 24 hours.  Assessment/Plan:     Crohn's disease of ileum with end ileostomy  Ivy Salgado is a 43 y.o. 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 She presents with abdominal pain which started 4/16 with concerns for partial SBO. Now having ostomy output.     PLAN:  - Continue observation  - NPO  - OK for no NGT at this time, discussed with patient symptoms and signs which would require placement of NGT  - Consult IBD GI team; appreciate recs  - Hold home constipating meds  - Continue home meds  - PRN pain control  - Strict I/O including ostomy output          Ian Garcia MD  Colorectal Surgery  [facility]

## 2024-04-18 ENCOUNTER — PATIENT MESSAGE (OUTPATIENT)
Dept: GASTROENTEROLOGY | Facility: CLINIC | Age: 44
End: 2024-04-18
Payer: COMMERCIAL

## 2024-04-18 VITALS
HEART RATE: 64 BPM | OXYGEN SATURATION: 100 % | SYSTOLIC BLOOD PRESSURE: 128 MMHG | TEMPERATURE: 98 F | WEIGHT: 120 LBS | HEIGHT: 61 IN | BODY MASS INDEX: 22.66 KG/M2 | DIASTOLIC BLOOD PRESSURE: 76 MMHG | RESPIRATION RATE: 18 BRPM

## 2024-04-18 LAB
ANION GAP SERPL CALC-SCNC: 7 MMOL/L (ref 8–16)
BASOPHILS # BLD AUTO: 0.04 K/UL (ref 0–0.2)
BASOPHILS NFR BLD: 1 % (ref 0–1.9)
BUN SERPL-MCNC: 6 MG/DL (ref 6–20)
CALCIUM SERPL-MCNC: 9 MG/DL (ref 8.7–10.5)
CHLORIDE SERPL-SCNC: 110 MMOL/L (ref 95–110)
CO2 SERPL-SCNC: 24 MMOL/L (ref 23–29)
CREAT SERPL-MCNC: 0.7 MG/DL (ref 0.5–1.4)
DIFFERENTIAL METHOD BLD: ABNORMAL
EOSINOPHIL # BLD AUTO: 0.2 K/UL (ref 0–0.5)
EOSINOPHIL NFR BLD: 3.7 % (ref 0–8)
ERYTHROCYTE [DISTWIDTH] IN BLOOD BY AUTOMATED COUNT: 14.9 % (ref 11.5–14.5)
EST. GFR  (NO RACE VARIABLE): >60 ML/MIN/1.73 M^2
GLUCOSE SERPL-MCNC: 99 MG/DL (ref 70–110)
HCT VFR BLD AUTO: 38.9 % (ref 37–48.5)
HGB BLD-MCNC: 11.4 G/DL (ref 12–16)
IMM GRANULOCYTES # BLD AUTO: 0.01 K/UL (ref 0–0.04)
IMM GRANULOCYTES NFR BLD AUTO: 0.2 % (ref 0–0.5)
LYMPHOCYTES # BLD AUTO: 2.1 K/UL (ref 1–4.8)
LYMPHOCYTES NFR BLD: 51.6 % (ref 18–48)
MCH RBC QN AUTO: 23.9 PG (ref 27–31)
MCHC RBC AUTO-ENTMCNC: 29.3 G/DL (ref 32–36)
MCV RBC AUTO: 82 FL (ref 82–98)
MONOCYTES # BLD AUTO: 0.5 K/UL (ref 0.3–1)
MONOCYTES NFR BLD: 11.8 % (ref 4–15)
NEUTROPHILS # BLD AUTO: 1.3 K/UL (ref 1.8–7.7)
NEUTROPHILS NFR BLD: 31.7 % (ref 38–73)
NRBC BLD-RTO: 0 /100 WBC
PLATELET # BLD AUTO: 311 K/UL (ref 150–450)
PMV BLD AUTO: 10 FL (ref 9.2–12.9)
POTASSIUM SERPL-SCNC: 3.9 MMOL/L (ref 3.5–5.1)
RBC # BLD AUTO: 4.77 M/UL (ref 4–5.4)
SODIUM SERPL-SCNC: 141 MMOL/L (ref 136–145)
WBC # BLD AUTO: 4.07 K/UL (ref 3.9–12.7)

## 2024-04-18 PROCEDURE — 63600175 PHARM REV CODE 636 W HCPCS: Performed by: NURSE PRACTITIONER

## 2024-04-18 PROCEDURE — 85025 COMPLETE CBC W/AUTO DIFF WBC: CPT | Performed by: STUDENT IN AN ORGANIZED HEALTH CARE EDUCATION/TRAINING PROGRAM

## 2024-04-18 PROCEDURE — 36415 COLL VENOUS BLD VENIPUNCTURE: CPT | Performed by: STUDENT IN AN ORGANIZED HEALTH CARE EDUCATION/TRAINING PROGRAM

## 2024-04-18 PROCEDURE — 25000003 PHARM REV CODE 250: Performed by: NURSE PRACTITIONER

## 2024-04-18 PROCEDURE — 80048 BASIC METABOLIC PNL TOTAL CA: CPT | Performed by: STUDENT IN AN ORGANIZED HEALTH CARE EDUCATION/TRAINING PROGRAM

## 2024-04-18 PROCEDURE — 25000003 PHARM REV CODE 250: Performed by: STUDENT IN AN ORGANIZED HEALTH CARE EDUCATION/TRAINING PROGRAM

## 2024-04-18 PROCEDURE — 25000003 PHARM REV CODE 250: Performed by: COLON & RECTAL SURGERY

## 2024-04-18 PROCEDURE — 63600175 PHARM REV CODE 636 W HCPCS: Performed by: STUDENT IN AN ORGANIZED HEALTH CARE EDUCATION/TRAINING PROGRAM

## 2024-04-18 RX ORDER — METHENAMINE MANDELATE 1000 MG/1
1000 TABLET, FILM COATED ORAL 2 TIMES DAILY
Status: DISCONTINUED | OUTPATIENT
Start: 2024-04-18 | End: 2024-04-18

## 2024-04-18 RX ORDER — PROMETHAZINE HYDROCHLORIDE 25 MG/1
25 TABLET ORAL EVERY 6 HOURS PRN
Qty: 30 TABLET | Refills: 0 | Status: SHIPPED | OUTPATIENT
Start: 2024-04-18

## 2024-04-18 RX ORDER — OXYCODONE HYDROCHLORIDE 5 MG/1
5 TABLET ORAL EVERY 4 HOURS PRN
Qty: 15 TABLET | Refills: 0 | Status: SHIPPED | OUTPATIENT
Start: 2024-04-18

## 2024-04-18 RX ORDER — HEPARIN 100 UNIT/ML
5 SYRINGE INTRAVENOUS ONCE
Status: COMPLETED | OUTPATIENT
Start: 2024-04-18 | End: 2024-04-18

## 2024-04-18 RX ORDER — METHENAMINE HIPPURATE 1000 MG/1
1 TABLET ORAL 2 TIMES DAILY
Status: DISCONTINUED | OUTPATIENT
Start: 2024-04-18 | End: 2024-04-18 | Stop reason: HOSPADM

## 2024-04-18 RX ORDER — METHENAMINE HIPPURATE 1000 MG/1
1 TABLET ORAL 2 TIMES DAILY
Qty: 60 TABLET | Refills: 1 | Status: SHIPPED | OUTPATIENT
Start: 2024-04-18

## 2024-04-18 RX ADMIN — ACETAMINOPHEN 650 MG: 325 TABLET ORAL at 12:04

## 2024-04-18 RX ADMIN — HEPARIN 500 UNITS: 100 SYRINGE at 01:04

## 2024-04-18 RX ADMIN — POTASSIUM CHLORIDE, DEXTROSE MONOHYDRATE AND SODIUM CHLORIDE: 150; 5; 450 INJECTION, SOLUTION INTRAVENOUS at 06:04

## 2024-04-18 RX ADMIN — ACETAMINOPHEN 650 MG: 325 TABLET ORAL at 05:04

## 2024-04-18 RX ADMIN — METHENAMINE HIPPURATE 1 G: 1000 TABLET ORAL at 12:04

## 2024-04-18 RX ADMIN — PANTOPRAZOLE SODIUM 40 MG: 40 TABLET, DELAYED RELEASE ORAL at 08:04

## 2024-04-18 RX ADMIN — GABAPENTIN 300 MG: 300 CAPSULE ORAL at 08:04

## 2024-04-18 RX ADMIN — OXYCODONE 5 MG: 5 TABLET ORAL at 08:04

## 2024-04-18 RX ADMIN — CLONAZEPAM 1 MG: 0.5 TABLET ORAL at 08:04

## 2024-04-18 NOTE — SUBJECTIVE & OBJECTIVE
Subjective:     Interval History: NAEON. AF, HDS. Seen by GI IBD and transplant ID yesterday. Plans for MRE outpatient and meds ordered for recurrent UTI. Tolerating low residue diet without issue. Ostomy productive.    Post-Op Info:  * No surgery found *          Medications:  Continuous Infusions:  Current Facility-Administered Medications   Medication Dose Route Frequency Last Rate Last Admin    dextrose 5 % and 0.45 % NaCl with KCl 20 mEq   Intravenous Continuous 75 mL/hr at 04/18/24 0611 New Bag at 04/18/24 0611     Scheduled Meds:  Current Facility-Administered Medications   Medication Dose Route Frequency    acetaminophen  650 mg Oral Q6H    clonazePAM  1 mg Oral BID    enoxparin  40 mg Subcutaneous Daily    gabapentin  300 mg Oral BID    methenamine  1,000 mg Oral BID    mirtazapine  30 mg Oral Nightly    pantoprazole  40 mg Oral BID     PRN Meds:  Current Facility-Administered Medications   Medication Dose Route Frequency Provider Last Rate Last Admin    acetaminophen tablet 650 mg  650 mg Oral Q6H Angelo Landaverde MD   650 mg at 04/18/24 0542    albuterol inhaler 2 puff  2 puff Inhalation Q6H PRN Angelo Landaverde MD        clonazePAM tablet 1 mg  1 mg Oral BID Vania Moeller NP   1 mg at 04/17/24 2016    dextrose 5 % and 0.45 % NaCl with KCl 20 mEq infusion   Intravenous Continuous Angelo Landaverde MD 75 mL/hr at 04/18/24 0611 New Bag at 04/18/24 0611    diphenhydrAMINE injection 12.5 mg  12.5 mg Intravenous Q4H PRN Angelo Landaverde MD        enoxaparin injection 40 mg  40 mg Subcutaneous Daily Angelo Landaverde MD   40 mg at 04/17/24 1739    gabapentin capsule 300 mg  300 mg Oral BID Angelo Landaverde MD   300 mg at 04/17/24 2016    HYDROmorphone injection 0.2 mg  0.2 mg Intravenous Q2H PRN Angelo Landaverde MD   0.2 mg at 04/16/24 2310    methenamine tablet 1,000 mg  1,000 mg Oral BID Andres Pineda MD        mirtazapine disintegrating tablet 30 mg  30 mg Oral Nightly Angelo Landaverde MD   30 mg at 04/17/24 2016     naloxone 0.4 mg/mL injection 0.02 mg  0.02 mg Intravenous PRN Angelo Landaverde MD        oxyCODONE immediate release tablet 5 mg  5 mg Oral Q4H PRN Angelo Landaverde MD   5 mg at 04/17/24 1739    pantoprazole EC tablet 40 mg  40 mg Oral BID Angelo Landaverde MD   40 mg at 04/17/24 2016    prochlorperazine injection Soln 5 mg  5 mg Intravenous Q6H PRN Angelo Landaverde MD   5 mg at 04/17/24 1740    promethazine tablet 25 mg  25 mg Oral Q6H PRN Angelo Landaverde MD   25 mg at 04/17/24 1353    sodium chloride 0.9% flush 10 mL  10 mL Intravenous PRN Angelo Landaverde MD            Objective:     Vital Signs (Most Recent):  Temp: 97.8 °F (36.6 °C) (04/18/24 0422)  Pulse: 72 (04/18/24 0422)  Resp: 17 (04/18/24 0422)  BP: 126/60 (04/18/24 0422)  SpO2: 99 % (04/18/24 0422) Vital Signs (24h Range):  Temp:  [97.4 °F (36.3 °C)-98.5 °F (36.9 °C)] 97.8 °F (36.6 °C)  Pulse:  [59-74] 72  Resp:  [17-18] 17  SpO2:  [99 %-100 %] 99 %  BP: (112-127)/(60-82) 126/60     Intake/Output - Last 3 Shifts         04/16 0700  04/17 0659 04/17 0700  04/18 0659 04/18 0700  04/19 0659    IV Piggyback 500      Total Intake(mL/kg) 500 (9.2)      Net +500             Stool Occurrence 1 x 1 x              Physical Exam  Constitutional:       General: She is not in acute distress.     Appearance: She is not ill-appearing.   HENT:      Head: Normocephalic and atraumatic.      Nose: Nose normal.      Mouth/Throat:      Mouth: Mucous membranes are moist.      Pharynx: Oropharynx is clear.   Eyes:      Extraocular Movements: Extraocular movements intact.      Conjunctiva/sclera: Conjunctivae normal.   Cardiovascular:      Rate and Rhythm: Normal rate and regular rhythm.      Pulses: Normal pulses.   Pulmonary:      Effort: Pulmonary effort is normal.   Abdominal:      General: Abdomen is flat. There is no distension.      Palpations: Abdomen is soft.      Tenderness: There is no abdominal tenderness. There is no guarding or rebound.      Comments: Ileostomy with bilious output  in bag   Musculoskeletal:         General: Normal range of motion.      Cervical back: Normal range of motion.   Skin:     General: Skin is warm and dry.      Capillary Refill: Capillary refill takes less than 2 seconds.   Neurological:      Mental Status: She is alert and oriented to person, place, and time.          Significant Labs:  BMP (Last 3 Results):   Recent Labs   Lab 04/16/24  1159 04/17/24  0501 04/18/24  0518   GLU 96 95 99    139 141   K 3.7 3.6 3.9    112* 110   CO2 22* 21* 24   BUN 12 5* 6   CREATININE 0.7 0.7 0.7   CALCIUM 9.4 8.5* 9.0     CBC (Last 3 Results):   Recent Labs   Lab 04/16/24  1159 04/17/24  0501 04/18/24  0518   WBC 8.67 4.82 4.07   RBC 4.36 4.28 4.77   HGB 10.4* 10.1* 11.4*   HCT 34.6* 35.1* 38.9    288 311   MCV 79* 82 82   MCH 23.9* 23.6* 23.9*   MCHC 30.1* 28.8* 29.3*       Significant Diagnostics:  I have reviewed all pertinent imaging results/findings within the past 24 hours.

## 2024-04-18 NOTE — PLAN OF CARE
"  Problem: Adult Inpatient Plan of Care  Goal: Plan of Care Review  Outcome: Ongoing, Progressing  Goal: Patient-Specific Goal (Individualized)  Outcome: Ongoing, Progressing  Goal: Optimal Comfort and Wellbeing  Outcome: Ongoing, Progressing  Goal: Readiness for Transition of Care  Outcome: Ongoing, Progressing   [facility] Plan of Care Note    Dx:   Nausea [R11.0]  SBO (small bowel obstruction) [K56.609]  Periumbilical abdominal pain [R10.33]    Shift Events: uneventful     Goals of Care: Pain control    Neuro: AAOx 4    Vital Signs: /60 (BP Location: Left arm, Patient Position: Lying)   Pulse 72   Temp 97.8 °F (36.6 °C) (Oral)   Resp 17   Ht 5' 1" (1.549 m)   Wt 54.4 kg (120 lb)   LMP 03/30/2015   SpO2 99%   Breastfeeding No   BMI 22.67 kg/m²     Respiratory: wnl     Diet: Diet Low Fiber/Residue      Is patient tolerating current diet? Low fiber diet     GTTS: IVF D5% and 0.45% with KCL 20mEq @75ml/hr    Urine Output/Bowel Movement:   No intake/output data recorded.  Last Bowel Movement: 04/17/24      Drains/Tubes/Tube Feeds (include total output/shift):   No intake/output data recorded.      Lines: Power port      Accuchecks:no    Skin: wnl     Fall Risk Score: 0    Activity level? independent    Any scheduled procedures? None scheduled    Any safety concerns? None     Other: none   "

## 2024-04-18 NOTE — HOSPITAL COURSE
Ms. Salgado is a 42yo female hx of Crohn's s/p total colectomy with end ileostomy who presented with abdominal pain and decreased ileostomy output. Workup was concerning for partial SBO. IBD GI was consulted who had previously performed ileoscopy in January which showed mild endoscopic disease. Her symptoms resolved after 24 hours. GI IBD recommended outpatient follow up with MRE. Ms Salgado also endorsed dysuria. She has a hx of multiple recurrent UTIs. Currently does not appear to have a UTI. Transplant ID was consulted and recommended preventative abx for UTI. Her diet was able to be advanced and she had good ileostomy output. She was discharged home in good condition.

## 2024-04-18 NOTE — PROGRESS NOTES
[facility]  Colorectal Surgery  Progress Note    Patient Name: Ivy Salgado  MRN: [id number]  Admission Date: 4/16/2024  Hospital Length of Stay: 2 days  Attending Physician: Bob Parsons MD    Subjective:     Interval History: NAEON. AF, HDS. Seen by GI IBD and transplant ID yesterday. Plans for MRE outpatient and meds ordered for recurrent UTI. Tolerating low residue diet without issue. Ostomy productive.    Post-Op Info:  * No surgery found *          Medications:  Continuous Infusions:  Current Facility-Administered Medications   Medication Dose Route Frequency Last Rate Last Admin    dextrose 5 % and 0.45 % NaCl with KCl 20 mEq   Intravenous Continuous 75 mL/hr at 04/18/24 0611 New Bag at 04/18/24 0611     Scheduled Meds:  Current Facility-Administered Medications   Medication Dose Route Frequency    acetaminophen  650 mg Oral Q6H    clonazePAM  1 mg Oral BID    enoxparin  40 mg Subcutaneous Daily    gabapentin  300 mg Oral BID    methenamine  1,000 mg Oral BID    mirtazapine  30 mg Oral Nightly    pantoprazole  40 mg Oral BID     PRN Meds:  Current Facility-Administered Medications   Medication Dose Route Frequency Provider Last Rate Last Admin    acetaminophen tablet 650 mg  650 mg Oral Q6H Angelo Landaverde MD   650 mg at 04/18/24 0542    albuterol inhaler 2 puff  2 puff Inhalation Q6H PRN Angelo Landaverde MD        clonazePAM tablet 1 mg  1 mg Oral BID Vania Moeller, NP   1 mg at 04/17/24 2016    dextrose 5 % and 0.45 % NaCl with KCl 20 mEq infusion   Intravenous Continuous Angelo Landaverde MD 75 mL/hr at 04/18/24 0611 New Bag at 04/18/24 0611    diphenhydrAMINE injection 12.5 mg  12.5 mg Intravenous Q4H PRN Angelo Landaverde MD        enoxaparin injection 40 mg  40 mg Subcutaneous Daily Angelo Landaverde MD   40 mg at 04/17/24 1739    gabapentin capsule 300 mg  300 mg Oral BID Angelo Landaverde MD   300 mg at 04/17/24 2016    HYDROmorphone injection 0.2 mg  0.2 mg Intravenous Q2H PRN Angelo Landaverde MD   0.2  mg at 04/16/24 2310    methenamine tablet 1,000 mg  1,000 mg Oral BID Andres Pineda MD        mirtazapine disintegrating tablet 30 mg  30 mg Oral Nightly Angelo Landaverde MD   30 mg at 04/17/24 2016    naloxone 0.4 mg/mL injection 0.02 mg  0.02 mg Intravenous PRN Angelo Landaverde MD        oxyCODONE immediate release tablet 5 mg  5 mg Oral Q4H PRN Angelo Landaverde MD   5 mg at 04/17/24 1739    pantoprazole EC tablet 40 mg  40 mg Oral BID Angelo Landaverde MD   40 mg at 04/17/24 2016    prochlorperazine injection Soln 5 mg  5 mg Intravenous Q6H PRN Angelo Landaverde MD   5 mg at 04/17/24 1740    promethazine tablet 25 mg  25 mg Oral Q6H PRN Angelo Landaverde MD   25 mg at 04/17/24 1353    sodium chloride 0.9% flush 10 mL  10 mL Intravenous PRN Angelo Landaverde MD            Objective:     Vital Signs (Most Recent):  Temp: 97.8 °F (36.6 °C) (04/18/24 0422)  Pulse: 72 (04/18/24 0422)  Resp: 17 (04/18/24 0422)  BP: 126/60 (04/18/24 0422)  SpO2: 99 % (04/18/24 0422) Vital Signs (24h Range):  Temp:  [97.4 °F (36.3 °C)-98.5 °F (36.9 °C)] 97.8 °F (36.6 °C)  Pulse:  [59-74] 72  Resp:  [17-18] 17  SpO2:  [99 %-100 %] 99 %  BP: (112-127)/(60-82) 126/60     Intake/Output - Last 3 Shifts         04/16 0700  04/17 0659 04/17 0700 04/18 0659 04/18 0700 04/19 0659    IV Piggyback 500      Total Intake(mL/kg) 500 (9.2)      Net +500             Stool Occurrence 1 x 1 x              Physical Exam  Constitutional:       General: She is not in acute distress.     Appearance: She is not ill-appearing.   HENT:      Head: Normocephalic and atraumatic.      Nose: Nose normal.      Mouth/Throat:      Mouth: Mucous membranes are moist.      Pharynx: Oropharynx is clear.   Eyes:      Extraocular Movements: Extraocular movements intact.      Conjunctiva/sclera: Conjunctivae normal.   Cardiovascular:      Rate and Rhythm: Normal rate and regular rhythm.      Pulses: Normal pulses.   Pulmonary:      Effort: Pulmonary effort is normal.   Abdominal:      General:  Abdomen is flat. There is no distension.      Palpations: Abdomen is soft.      Tenderness: There is no abdominal tenderness. There is no guarding or rebound.      Comments: Ileostomy with bilious output in bag   Musculoskeletal:         General: Normal range of motion.      Cervical back: Normal range of motion.   Skin:     General: Skin is warm and dry.      Capillary Refill: Capillary refill takes less than 2 seconds.   Neurological:      Mental Status: She is alert and oriented to person, place, and time.          Significant Labs:  BMP (Last 3 Results):   Recent Labs   Lab 04/16/24  1159 04/17/24  0501 04/18/24  0518   GLU 96 95 99    139 141   K 3.7 3.6 3.9    112* 110   CO2 22* 21* 24   BUN 12 5* 6   CREATININE 0.7 0.7 0.7   CALCIUM 9.4 8.5* 9.0     CBC (Last 3 Results):   Recent Labs   Lab 04/16/24  1159 04/17/24  0501 04/18/24  0518   WBC 8.67 4.82 4.07   RBC 4.36 4.28 4.77   HGB 10.4* 10.1* 11.4*   HCT 34.6* 35.1* 38.9    288 311   MCV 79* 82 82   MCH 23.9* 23.6* 23.9*   MCHC 30.1* 28.8* 29.3*       Significant Diagnostics:  I have reviewed all pertinent imaging results/findings within the past 24 hours.  Assessment/Plan:     Crohn's disease of ileum with end ileostomy  Ivy Salgado is a 43 y.o. 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 She presents with abdominal pain which started 4/16 with concerns for partial SBO. Now having ostomy output.     PLAN:  - Continue observation  - LRD  - GI IBD following, recommending MRE outpatient  -Transplant ID following, recommending methenamine bid, will continue at discharge  - Hold home constipating meds  - Continue home meds  - PRN pain control  - Strict I/O including ostomy output    Dispo: likely DC today          Ian Garcia MD  Colorectal Surgery  [facility]

## 2024-04-18 NOTE — PLAN OF CARE
Problem: Adult Inpatient Plan of Care  Goal: Plan of Care Review  Outcome: Met  Goal: Patient-Specific Goal (Individualized)  Outcome: Met  Goal: Absence of Hospital-Acquired Illness or Injury  Outcome: Met  Goal: Optimal Comfort and Wellbeing  Outcome: Met  Goal: Readiness for Transition of Care  Outcome: Met   Patient discharging home with family, discharge instructions reviewed in detail with patient, allowed time for questions, all questions answered, power port was deaccessed, tolerated well, discharge medications have been delivered to bedside, awaiting transportation .

## 2024-04-18 NOTE — DISCHARGE SUMMARY
[facility]  Colorectal Surgery  Discharge Summary      Patient Name: Ivy Salgado  MRN: [id number]  Admission Date: 4/16/2024  Hospital Length of Stay: 2 days  Discharge Date and Time:  04/18/2024 9:57 AM  Attending Physician: Bob Parsons MD   Discharging Provider: Andres Pineda MD  Primary Care Provider: Luis Madden DO     HPI:  No notes on file    * No surgery found *     Hospital Course:  Ms. Salgado is a 44yo female hx of Crohn's s/p total colectomy with end ileostomy who presented with abdominal pain and decreased ileostomy output. Workup was concerning for partial SBO. IBD GI was consulted who had previously performed ileoscopy in January which showed mild endoscopic disease. Her symptoms resolved after 24 hours. GI IBD recommended outpatient follow up with MRE. Ms Salgado also endorsed dysuria. She has a hx of multiple recurrent UTIs. Currently does not appear to have a UTI. Transplant ID was consulted and recommended preventative abx for UTI. Her diet was able to be advanced and she had good ileostomy output. She was discharged home in good condition.     Goals of Care Treatment Preferences:  Code Status: Full Code    Living Will: Yes              Consults (From admission, onward)          Status Ordering Provider     Inpatient consult to Infectious Diseases  Once        Provider:  (Not yet assigned)    Completed BARBARA ALVARADO     Inpatient consult to Gastroenterology  Once        Provider:  (Not yet assigned)    Completed COREY GRACE     Inpatient consult to Colorectal Surgery  Once        Provider:  (Not yet assigned)    Completed COREY GRACE            Significant Diagnostic Studies: N/A    Pending Diagnostic Studies:       None          Final Active Diagnoses:    Diagnosis Date Noted POA    PRINCIPAL PROBLEM:  Crohn's disease of ileum with end ileostomy [K50.018] 09/18/2013 Yes    Dysuria [R30.0] 04/17/2024 Yes      Problems Resolved During this Admission:      Discharged  Condition: good    Disposition: Home or Self Care    Follow Up:   Follow-up Information       Bob Parsons MD Follow up in 2 week(s).    Specialty: Colon and Rectal Surgery  Contact information:  [address]  [facility] [address] [phone]                           Patient Instructions:      Diet Adult Regular     Notify your health care provider if you experience any of the following:  temperature >100.4     Notify your health care provider if you experience any of the following:  persistent nausea and vomiting or diarrhea     Notify your health care provider if you experience any of the following:  severe uncontrolled pain     Activity as tolerated     Medications:  Reconciled Home Medications:      Medication List        START taking these medications      methenamine 1 gram Tab  Commonly known as: HIPREX  Take 1 tablet (1 g total) by mouth 2 (two) times daily.     oxyCODONE 5 MG immediate release tablet  Commonly known as: ROXICODONE  Take 1 tablet (5 mg total) by mouth every 4 (four) hours as needed for Pain.     promethazine 25 MG tablet  Commonly known as: PHENERGAN  Take 1 tablet (25 mg total) by mouth every 6 (six) hours as needed for Nausea.            CONTINUE taking these medications      clonazePAM 1 MG tablet  Commonly known as: KlonoPIN  Take 1 tablet (1 mg total) by mouth 2 (two) times daily.     cyclobenzaprine 10 MG tablet  Commonly known as: FLEXERIL  Take 1 tablet (10 mg total) by mouth every evening as needed for muscle pain     diphenoxylate-atropine 2.5-0.025 mg/5 ml 2.5-0.025 mg/5 mL liquid  Commonly known as: LOMOTIL  Take 2.5-5 ml up to 4 times a day - dose might change at visit on 1/30     droNABinol 5 MG capsule  Commonly known as: MARINOL  Take 1 capsule (5 mg total) by mouth 2 (two) times daily before meals.     estradiol 0.025 mg/24 hr 0.025 mg/24 hr  Place 1 patch onto the skin once a week.     gabapentin 300 MG capsule  Commonly known as: NEURONTIN  Take 1 capsule  (300 mg total) by mouth 2 (two) times daily.     loperamide 1 mg/7.5 mL solution  Commonly known as: IMODIUM  Take 4 mg by mouth 3 (three) times daily as needed for Diarrhea.     mirtazapine 30 MG disintegrating tablet  Commonly known as: REMERON SOL-TAB  Take 1 tablet (30 mg total) by mouth nightly.     multivitamin per tablet  Commonly known as: THERAGRAN  Take 1 tablet by mouth once daily.     nadoloL 40 MG tablet  Commonly known as: CORGARD  Take 1 tablet (40 mg total) by mouth once daily. Patient needs appointment before next refill.     nitrofurantoin (macrocrystal-monohydrate) 100 MG capsule  Commonly known as: MACROBID  Take 1 capsule (100 mg total) by mouth 2 (two) times daily. for 7 days     pantoprazole 40 MG tablet  Commonly known as: PROTONIX  Take 1 tablet (40 mg total) by mouth 2 (two) times daily.     valACYclovir 500 MG tablet  Commonly known as: VALTREX  Take 1 tablet (500 mg total) by mouth once daily.     vedolizumab 300 mg Solr injection  Commonly known as: ENTYVIO  Inject 300 mg into the vein every 8 weeks.     zolpidem 10 mg Tab  Commonly known as: AMBIEN  Take 1 tablet (10 mg total) by mouth every evening.            ASK your doctor about these medications      albuterol 90 mcg/actuation inhaler  Commonly known as: VENTOLIN HFA  Inhale 2 puffs into the lungs every 6 (six) hours as needed. Rescue     ipratropium 21 mcg (0.03 %) nasal spray  Commonly known as: ATROVENT  use 2 sprays by Each Nostril route 2 (two) times daily as needed.     tamsulosin 0.4 mg Cap  Commonly known as: FLOMAX  Take 1 capsule (0.4 mg total) by mouth once daily.              Andres Pineda MD  Colorectal Surgery  [facility]

## 2024-04-18 NOTE — ASSESSMENT & PLAN NOTE
Ivy Salgado is a 43 y.o. 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 She presents with abdominal pain which started 4/16 with concerns for partial SBO. Now having ostomy output.     PLAN:  - Continue observation  - LRD  - GI IBD following, recommending MRE outpatient  -Transplant ID following, recommending methenamine bid, will continue at discharge  - Hold home constipating meds  - Continue home meds  - PRN pain control  - Strict I/O including ostomy output    Dispo: likely DC today

## 2024-04-18 NOTE — PLAN OF CARE
[facility]  Discharge Final Note    Primary Care Provider: Luis Madden DO    Expected Discharge Date: 4/18/2024    Final Discharge Note (most recent)       Final Note - 04/18/24 1110          Final Note    Assessment Type Final Discharge Note     Anticipated Discharge Disposition Home or Self Care     Hospital Resources/Appts/Education Provided Provided patient/caregiver with written discharge plan information        Post-Acute Status    Patient choice form signed by patient/caregiver List with quality metrics by geographic area provided     Discharge Delays None known at this time                     Important Message from Medicare             Contact Info       Bob Parsons MD   Specialty: Colon and Rectal Surgery    [address] [facility] [address]   Phone: [phone]       Next Steps: Follow up in 2 week(s)            Pt d/c home with family. No d/c needs reported by medical team at this time.     Sophia Quispe LCSW  Case Management/[facility]  [phone]

## 2024-04-19 DIAGNOSIS — Z93.2 HIGH OUTPUT ILEOSTOMY: ICD-10-CM

## 2024-04-19 DIAGNOSIS — R19.8 HIGH OUTPUT ILEOSTOMY: ICD-10-CM

## 2024-04-19 DIAGNOSIS — K50.018 CROHN'S DISEASE OF SMALL INTESTINE WITH OTHER COMPLICATION: Primary | ICD-10-CM

## 2024-04-22 ENCOUNTER — PATIENT MESSAGE (OUTPATIENT)
Dept: GASTROENTEROLOGY | Facility: CLINIC | Age: 44
End: 2024-04-22
Payer: COMMERCIAL

## 2024-04-25 ENCOUNTER — CLINICAL SUPPORT (OUTPATIENT)
Dept: REHABILITATION | Facility: HOSPITAL | Age: 44
End: 2024-04-25
Payer: COMMERCIAL

## 2024-04-25 DIAGNOSIS — M25.612 DECREASED RANGE OF MOTION OF LEFT SHOULDER: Primary | ICD-10-CM

## 2024-04-25 PROCEDURE — 97530 THERAPEUTIC ACTIVITIES: CPT

## 2024-04-25 PROCEDURE — 97112 NEUROMUSCULAR REEDUCATION: CPT

## 2024-04-29 ENCOUNTER — PATIENT MESSAGE (OUTPATIENT)
Dept: REHABILITATION | Facility: HOSPITAL | Age: 44
End: 2024-04-29
Payer: COMMERCIAL

## 2024-04-29 NOTE — PROGRESS NOTES
[facility]   Physical Therapy Treatment Note     Name: Ivy Salgado  Clinic Number: [id number]    Therapy Diagnosis:   Encounter Diagnosis   Name Primary?    Decreased range of motion of left shoulder Yes         Physician: Mono Giles III, *    Visit Date: 4/25/2024     Evaluation Date: 8/8/2023  Authorization Period Expiration: 7/6/2024  Plan of Care Expiration: 12/31/2023  Progress Note Due: 9/10/2023  Visit # / Visits authorized: 14/ 20  Foto: 3/3     Time In: 1500  Time Out: 1600  Total Billable Time: 55 minutes     DOS: 7/18/2023  S/p: left  1. Total shoulder arthroplasty (complex, -22 modifier)  2. Shoulder lysis of adhesions  3. Shoulder subpectoral biceps tenodesis (CPT 58673)  Post-Op Precautions: We will follow the shoulder arthroplasty guidelines. The patient remained in a sling for 6 weeks. We will start PT at the 3-week martina.       Precautions: none    SUBJECTIVE     Pt reports: she hurt her shoulder or upper back on her L side yesterday and it feels better today but still hurts.     She was compliant with home exercise program.  Response to previous treatment: improvement in pain at rest   Functional change: able to ttol reaching overhead a bit better    Pain: 3/10  Location: left shoulder      OBJECTIVE       Neck ROM:   Flexion: 100% hinge lower cervical   Extension: 75% * pain over pressure, hinge mid cervical   Rotation R: 65% pain full   Rotation L 100%     Treatment     Ivy received the treatments listed below:        Manual therapy techniques: Joint mobilizations and Soft tissue Mobilization were applied to the: L shoulder for 02 minutes, including:  Thoracic manip    Neuromuscular re-education activities to improve: motor control for 20 min  Resisted IR 3x15 otb   Resisted ER 2x15 ytb     Hand heel rocks 30x     Therapeutic activities to improve functional performance for 20  minutes, including:  UBE 3m forward / 3m backward L UE only   Thoracic rotations  15x B   Thoracic extensions over FR x5m         Patient Education and Home Exercises     Home Exercises Provided and Patient Education Provided     Education provided:   - Add new exercises     Written Home Exercises Provided: yes. Exercises were reviewed and Ivy was able to demonstrate them prior to the end of the session.  Ivy demonstrated good  understanding of the education provided. See EMR under Patient Instructions for exercises provided during therapy sessions    ASSESSMENT     Due to the patient's recent hospitalization, dec intensity of the movements today, focused on mobility and light strengthening     Ivy Is progressing well towards her goals.     Pt prognosis is Fair.     Pt will continue to benefit from skilled outpatient physical therapy to address the deficits listed in the problem list box on initial evaluation, provide pt/family education and to maximize pt's level of independence in the home and community environment.     Pt's spiritual, cultural and educational needs considered and pt agreeable to plan of care and goals.     Anticipated barriers to physical therapy: none     Goals:   Short Term Goals: 12 weeks  1. Pt will be compliant with HEP 50% of prescribed amount. met  2. The pt to demo improvement in R shoulder PROM to by 80% of the L met  3.  The pt to demo tolerance to being out of the sling for 24 hours with pain <2/10 met     Long Term Goals:  36 weeks   Pt will be compliant with % of prescribed amount.  met  The pt to improvement in AROM of L shoulder within 80% of R shoulder to improve tolerance to OH movement  met  The pt to  Demo at least 4+/5 of RTC muscle testing to demo improvement in tolerance to activity progressing, not met  The pt to tolerate lifting 50# from the ground to waist height per job requirement description progressing, not met  The pt will report full participation in ADLs and IADLs without restrictions related to L Shoulder.  progressing, not  met    PLAN     Follow TSA procedure     Dorothy Basurto PT,

## 2024-05-02 ENCOUNTER — CLINICAL SUPPORT (OUTPATIENT)
Dept: REHABILITATION | Facility: HOSPITAL | Age: 44
End: 2024-05-02
Payer: COMMERCIAL

## 2024-05-02 DIAGNOSIS — M25.612 DECREASED RANGE OF MOTION OF LEFT SHOULDER: Primary | ICD-10-CM

## 2024-05-02 PROCEDURE — 97530 THERAPEUTIC ACTIVITIES: CPT

## 2024-05-02 PROCEDURE — 97112 NEUROMUSCULAR REEDUCATION: CPT

## 2024-05-06 NOTE — PROGRESS NOTES
[facility]   Physical Therapy Treatment Note     Name: Ivy Salgado  Clinic Number: [id number]    Therapy Diagnosis:   Encounter Diagnosis   Name Primary?    Decreased range of motion of left shoulder Yes         Physician: Mono Giles III, *    Visit Date: 5/2/2024     Evaluation Date: 8/8/2023  Authorization Period Expiration: 7/6/2024  Plan of Care Expiration: 12/31/2023  Progress Note Due: 9/10/2023  Visit # / Visits authorized: 18/ 20  Foto: 3/3     Time In: 1500  Time Out: 1600  Total Billable Time: 52 minutes     DOS: 7/18/2023  S/p: left  1. Total shoulder arthroplasty (complex, -22 modifier)  2. Shoulder lysis of adhesions  3. Shoulder subpectoral biceps tenodesis (CPT 02681)  Post-Op Precautions: We will follow the shoulder arthroplasty guidelines. The patient remained in a sling for 6 weeks. We will start PT at the 3-week martina.       Precautions: none    SUBJECTIVE     Pt reports: she does feel good in her shoulder, trying to keep strengthening at the gym.      She was compliant with home exercise program.  Response to previous treatment: improvement in pain at rest   Functional change: able to ttol reaching overhead a bit better    Pain: 3/10  Location: left shoulder      OBJECTIVE       Treatment     Ivy received the treatments listed below:        Manual therapy techniques: Joint mobilizations and Soft tissue Mobilization were applied to the: L shoulder for 02 minutes, including:  Thoracic manip    Neuromuscular re-education activities to improve: motor control for 10 min  Resisted IR 3x15 otb   Resisted ER 2x15 ytb     Hand heel rocks 30x     Therapeutic activities to improve functional performance for 40  minutes, including:  UBE 3m forward / 3m backward L UE only   Thoracic rotations 15x B   Thoracic extensions over FR x5m   OH Press on wall 4# 5x5   Lateral raises 2# 4x8   Front raises 2# 3x10   Bent over rows 4x8 6#     Patient Education and Home Exercises      Home Exercises Provided and Patient Education Provided     Education provided:   - Add new exercises     Written Home Exercises Provided: yes. Exercises were reviewed and Ivy was able to demonstrate them prior to the end of the session.  Ivy demonstrated good  understanding of the education provided. See EMR under Patient Instructions for exercises provided during therapy sessions    ASSESSMENT     The patient tolerated new therex well, no pain in her shoulder during strengthening. Overall the pt is making excellent progress towards goals. Hopeful to discharge to [facility] in the next two weeks.     Ivy Is progressing well towards her goals.     Pt prognosis is Fair.     Pt will continue to benefit from skilled outpatient physical therapy to address the deficits listed in the problem list box on initial evaluation, provide pt/family education and to maximize pt's level of independence in the home and community environment.     Pt's spiritual, cultural and educational needs considered and pt agreeable to plan of care and goals.     Anticipated barriers to physical therapy: none     Goals:   Short Term Goals: 12 weeks  1. Pt will be compliant with HEP 50% of prescribed amount. met  2. The pt to demo improvement in R shoulder PROM to by 80% of the L met  3.  The pt to demo tolerance to being out of the sling for 24 hours with pain <2/10 met     Long Term Goals:  36 weeks   Pt will be compliant with % of prescribed amount.  met  The pt to improvement in AROM of L shoulder within 80% of R shoulder to improve tolerance to OH movement  met  The pt to  Demo at least 4+/5 of C muscle testing to demo improvement in tolerance to activity progressing, not met  The pt to tolerate lifting 50# from the ground to waist height per job requirement description progressing, not met  The pt will report full participation in ADLs and IADLs without restrictions related to L Shoulder.  progressing, not met    PLAN      Follow TSA procedure     Dorothy Basurto PT,

## 2024-05-07 ENCOUNTER — PATIENT MESSAGE (OUTPATIENT)
Dept: REHABILITATION | Facility: HOSPITAL | Age: 44
End: 2024-05-07
Payer: COMMERCIAL

## 2024-05-07 ENCOUNTER — PATIENT MESSAGE (OUTPATIENT)
Dept: ELECTROPHYSIOLOGY | Facility: CLINIC | Age: 44
End: 2024-05-07
Payer: COMMERCIAL

## 2024-05-07 ENCOUNTER — PATIENT MESSAGE (OUTPATIENT)
Dept: UROLOGY | Facility: CLINIC | Age: 44
End: 2024-05-07
Payer: COMMERCIAL

## 2024-05-08 ENCOUNTER — PATIENT MESSAGE (OUTPATIENT)
Dept: INTERNAL MEDICINE | Facility: CLINIC | Age: 44
End: 2024-05-08
Payer: COMMERCIAL

## 2024-05-08 ENCOUNTER — HOSPITAL ENCOUNTER (OUTPATIENT)
Dept: CARDIOLOGY | Facility: CLINIC | Age: 44
Discharge: HOME | End: 2024-05-08
Payer: COMMERCIAL

## 2024-05-08 ENCOUNTER — PATIENT MESSAGE (OUTPATIENT)
Dept: REHABILITATION | Facility: HOSPITAL | Age: 44
End: 2024-05-08
Payer: COMMERCIAL

## 2024-05-08 ENCOUNTER — LAB VISIT (OUTPATIENT)
Dept: LAB | Facility: HOSPITAL | Age: 44
End: 2024-05-08
Attending: INTERNAL MEDICINE
Payer: COMMERCIAL

## 2024-05-08 DIAGNOSIS — N39.0 RECURRENT UTI: Primary | ICD-10-CM

## 2024-05-08 DIAGNOSIS — Z79.899 ENCOUNTER FOR LONG-TERM (CURRENT) USE OF HIGH-RISK MEDICATION: ICD-10-CM

## 2024-05-08 DIAGNOSIS — K50.819 CROHN'S DISEASE OF BOTH SMALL AND LARGE INTESTINE WITH COMPLICATION: ICD-10-CM

## 2024-05-08 DIAGNOSIS — N39.0 RECURRENT UTI: ICD-10-CM

## 2024-05-08 DIAGNOSIS — F41.1 GENERALIZED ANXIETY DISORDER: ICD-10-CM

## 2024-05-08 DIAGNOSIS — I45.81 LONG QT SYNDROME: ICD-10-CM

## 2024-05-08 DIAGNOSIS — I45.81 LONG QT SYNDROME: Primary | ICD-10-CM

## 2024-05-08 LAB
BILIRUB UR QL STRIP: NEGATIVE
CLARITY UR REFRACT.AUTO: CLEAR
COLOR UR AUTO: YELLOW
GLUCOSE UR QL STRIP: NEGATIVE
HGB UR QL STRIP: NEGATIVE
KETONES UR QL STRIP: NEGATIVE
LEUKOCYTE ESTERASE UR QL STRIP: NEGATIVE
NITRITE UR QL STRIP: NEGATIVE
OHS QRS DURATION: 80 MS
OHS QTC CALCULATION: 443 MS
PH UR STRIP: 6 [PH] (ref 5–8)
PROT UR QL STRIP: ABNORMAL
SP GR UR STRIP: 1.02 (ref 1–1.03)
URN SPEC COLLECT METH UR: ABNORMAL

## 2024-05-08 PROCEDURE — 93005 ELECTROCARDIOGRAM TRACING: CPT | Mod: S$GLB,,, | Performed by: INTERNAL MEDICINE

## 2024-05-08 PROCEDURE — 87086 URINE CULTURE/COLONY COUNT: CPT | Performed by: NURSE PRACTITIONER

## 2024-05-08 PROCEDURE — 81003 URINALYSIS AUTO W/O SCOPE: CPT | Performed by: INTERNAL MEDICINE

## 2024-05-08 PROCEDURE — 93010 ELECTROCARDIOGRAM REPORT: CPT | Mod: S$GLB,,, | Performed by: INTERNAL MEDICINE

## 2024-05-08 RX ORDER — PANTOPRAZOLE SODIUM 40 MG/1
40 TABLET, DELAYED RELEASE ORAL 2 TIMES DAILY
Qty: 60 TABLET | Refills: 12 | Status: SHIPPED | OUTPATIENT
Start: 2024-05-08

## 2024-05-09 ENCOUNTER — TELEPHONE (OUTPATIENT)
Dept: GASTROENTEROLOGY | Facility: CLINIC | Age: 44
End: 2024-05-09
Payer: COMMERCIAL

## 2024-05-09 LAB — BACTERIA UR CULT: NO GROWTH

## 2024-05-09 RX ORDER — DRONABINOL 5 MG/1
5 CAPSULE ORAL
Qty: 60 CAPSULE | Refills: 1 | Status: SHIPPED | OUTPATIENT
Start: 2024-05-09

## 2024-05-09 RX ORDER — MIRTAZAPINE 30 MG/1
30 TABLET, ORALLY DISINTEGRATING ORAL NIGHTLY
Qty: 30 TABLET | Refills: 0 | Status: SHIPPED | OUTPATIENT
Start: 2024-05-09 | End: 2024-06-05 | Stop reason: SDUPTHER

## 2024-05-09 NOTE — TELEPHONE ENCOUNTER
----- Message from Kalie Hooks RN sent at 5/9/2024 12:53 PM CDT -----  Regarding: Entyvio clearance  Good afternoon! Ivy Salgado canceled her Entyvio infusion today because she wasn't feeling well. She was on Entyvio several years ago and stopped because of how she was feeling. She was getting frequent heart palpitations (diagnosed with long QT syndrome) and infections. Yesterday, she went for an EKG since she was having heart palpitations again. She said the results are normal. She's also concerned because she currently has a yeast infection. Would it be possible to reach out to the patient before we reschedule her? Thank you, Kalie

## 2024-05-09 NOTE — TELEPHONE ENCOUNTER
Spoke with pt  - Entyvio infusion yesterday was cancelled as patient was not feeling well- heart palpitations and yeast infection  - Patient has history of long QT syndrome- still having off/ on heart palpitations- EKG 5/8/24 WNL  - Endorses vaginal yeast infection- itching, white discharge- started 2-3 days ago. Started 7-day Monistat OTC yesterday.  Had yeast infections in the past- Usually tries Monistat first and if it does not work, goes to PCP or OBGYN.   Infection is not worsening, stable compared to when it started  - Yeast infection around the stoma (red and patchy around the stoma). Has seen María in the past for this issue. Started at the beginnning of the week and is improving-  Has been using barrier spray and nystatin powder.    - Subjective fever yesterday but did not take temperature yesterday.  Does not feel feverish today.  - recent urinalysis ok- no UTI    - Will update Dr Garcia

## 2024-05-09 NOTE — TELEPHONE ENCOUNTER
No care due was identified.  [facility] Embedded Care Due Messages. Reference number: [id number].   5/09/2024 12:49:19 PM CDT

## 2024-05-10 NOTE — TELEPHONE ENCOUNTER
Spoke with patient  - Given gut-specific mechanism of Entyvio, infection risk is not high but can proceed with conservative approach- resume Entyvio infusion after completing Monistat (will finish 5/15/24).  Patient said she is off work 5/17/24 and prefers to schedule then  - Patient to continue treating yeast infection around the stoma with nystatin powder and barrier cream  - Patient will reach out to the office if she develops fever or infection worsens/ does not resolve  - Schedule subsequent Entyvio infusion 8 weeks after infusion due date (5/9/24) which is 7/4/24    Case discussed with Dr Garcia.    Infusion team updated- requested to schedule Entyvio infusions 5/17 and then 7/4.

## 2024-05-15 ENCOUNTER — TELEPHONE (OUTPATIENT)
Dept: GASTROENTEROLOGY | Facility: CLINIC | Age: 44
End: 2024-05-15
Payer: COMMERCIAL

## 2024-05-15 ENCOUNTER — CLINICAL SUPPORT (OUTPATIENT)
Dept: REHABILITATION | Facility: HOSPITAL | Age: 44
End: 2024-05-15
Payer: COMMERCIAL

## 2024-05-15 ENCOUNTER — PATIENT MESSAGE (OUTPATIENT)
Dept: GASTROENTEROLOGY | Facility: CLINIC | Age: 44
End: 2024-05-15
Payer: COMMERCIAL

## 2024-05-15 DIAGNOSIS — K50.018 CROHN'S DISEASE OF SMALL INTESTINE WITH OTHER COMPLICATION: Primary | ICD-10-CM

## 2024-05-15 DIAGNOSIS — M25.612 DECREASED RANGE OF MOTION OF LEFT SHOULDER: Primary | ICD-10-CM

## 2024-05-15 PROCEDURE — 97530 THERAPEUTIC ACTIVITIES: CPT | Mod: CQ

## 2024-05-15 PROCEDURE — 97112 NEUROMUSCULAR REEDUCATION: CPT | Mod: CQ

## 2024-05-15 NOTE — TELEPHONE ENCOUNTER
----- Message from Peace Garcia MD sent at 5/15/2024  3:49 PM CDT -----  I don't think we can keep port access open if at 2 different locations    SS  ----- Message -----  From: Parul Hair, MIKE  Sent: 5/15/2024   3:12 PM CDT  To: Peace Garcia MD    Entyvio infusion is for 11 am followed by MRI at 5 pm the same day[facility]  ----- Message -----  From: Zane Paul  Sent: 5/15/2024   1:10 PM CDT  To: [facility] Staff    Name of Who is Calling:PHANI RODRIGUEZ [[id number]]        What is the request in detail:Pt would like a callback from the office in regards to discussing if port can be left open (FOR ACCESS) after her infusion on Friday to complete her MRI.Please advise thank you       Can the clinic reply by [facility]:NO        What Number to Call Back if not in [facility]:.Telephone Information:  Mobile          [phone]

## 2024-05-15 NOTE — PROGRESS NOTES
[facility]   Physical Therapy Treatment Note     Name: Ivy Salgado  Clinic Number: [id number]    Therapy Diagnosis:   Encounter Diagnosis   Name Primary?    Decreased range of motion of left shoulder Yes         Physician: Mono Giles III, *    Visit Date: 5/15/2024     Evaluation Date: 8/8/2023  Authorization Period Expiration: 7/6/2024  Plan of Care Expiration: 12/31/2023  Progress Note Due: 9/10/2023  Visit # / Visits authorized: 19/ 20  Foto: 3/3     Time In: 4:25  Time Out: 5:25  Total Billable Time: 60 minutes     DOS: 7/18/2023  S/p: left  1. Total shoulder arthroplasty (complex, -22 modifier)  2. Shoulder lysis of adhesions  3. Shoulder subpectoral biceps tenodesis (CPT 25247)  Post-Op Precautions: We will follow the shoulder arthroplasty guidelines. The patient remained in a sling for 6 weeks. We will start PT at the 3-week martina.       Precautions: none    SUBJECTIVE     Pt reports: trying to get to gym     She was compliant with home exercise program.  Response to previous treatment: improvement in pain at rest   Functional change: able to ttol reaching overhead a bit better    Pain: 3/10  Location: left shoulder      OBJECTIVE       Treatment     Ivy received the treatments listed below:        Manual therapy techniques: Joint mobilizations and Soft tissue Mobilization were applied to the: L shoulder for 00 minutes, including:  Thoracic manip    Neuromuscular re-education activities to improve: motor control for 15 min  Resisted IR 3x15 otb   Resisted ER 2x15 ytb     Hand heel rocks 30x     Therapeutic activities to improve functional performance for 45  minutes, including:  UBE 3m forward / 3m backward L UE only lvl 4.5  Thoracic rotations 15x B   Thoracic extensions over FR x5m   OH Press on wall 4# 5x5   Lateral raises 2# 4x8   Front raises 2# 3x10   Bent over rows 4x8 6#     Patient Education and Home Exercises     Home Exercises Provided and Patient Education  Provided     Education provided:   - Add new exercises     Written Home Exercises Provided: yes. Exercises were reviewed and Ivy was able to demonstrate them prior to the end of the session.  Ivy demonstrated good  understanding of the education provided. See EMR under Patient Instructions for exercises provided during therapy sessions    ASSESSMENT     The patient tolerated new therex well, no pain in her shoulder during strengthening.      Ivy Is progressing well towards her goals.     Pt prognosis is Fair.     Pt will continue to benefit from skilled outpatient physical therapy to address the deficits listed in the problem list box on initial evaluation, provide pt/family education and to maximize pt's level of independence in the home and community environment.     Pt's spiritual, cultural and educational needs considered and pt agreeable to plan of care and goals.     Anticipated barriers to physical therapy: none     Goals:   Short Term Goals: 12 weeks  1. Pt will be compliant with HEP 50% of prescribed amount. met  2. The pt to demo improvement in R shoulder PROM to by 80% of the L met  3.  The pt to demo tolerance to being out of the sling for 24 hours with pain <2/10 met     Long Term Goals:  36 weeks   Pt will be compliant with % of prescribed amount.  met  The pt to improvement in AROM of L shoulder within 80% of R shoulder to improve tolerance to OH movement  met  The pt to  Demo at least 4+/5 of RTC muscle testing to demo improvement in tolerance to activity progressing, not met  The pt to tolerate lifting 50# from the ground to waist height per job requirement description progressing, not met  The pt will report full participation in ADLs and IADLs without restrictions related to L Shoulder.  progressing, not met    PLAN     Follow TSA procedure     Vidya Lemus PTA,

## 2024-05-17 ENCOUNTER — HOSPITAL ENCOUNTER (OUTPATIENT)
Dept: RADIOLOGY | Facility: HOSPITAL | Age: 44
Discharge: HOME | End: 2024-05-17
Attending: INTERNAL MEDICINE
Payer: COMMERCIAL

## 2024-05-17 DIAGNOSIS — K50.018 CROHN'S DISEASE OF SMALL INTESTINE WITH OTHER COMPLICATION: ICD-10-CM

## 2024-05-17 DIAGNOSIS — Z45.2 ENCOUNTER FOR CARE RELATED TO PORT-A-CATH: Primary | ICD-10-CM

## 2024-05-17 DIAGNOSIS — R19.8 HIGH OUTPUT ILEOSTOMY: ICD-10-CM

## 2024-05-17 DIAGNOSIS — Z93.2 HIGH OUTPUT ILEOSTOMY: ICD-10-CM

## 2024-05-17 PROCEDURE — A9585 GADOBUTROL INJECTION: HCPCS | Performed by: INTERNAL MEDICINE

## 2024-05-17 PROCEDURE — 25500020 PHARM REV CODE 255: Performed by: INTERNAL MEDICINE

## 2024-05-17 PROCEDURE — 74183 MRI ABD W/O CNTR FLWD CNTR: CPT | Mod: 26,,, | Performed by: RADIOLOGY

## 2024-05-17 PROCEDURE — 72197 MRI PELVIS W/O & W/DYE: CPT | Mod: 26,,, | Performed by: RADIOLOGY

## 2024-05-17 PROCEDURE — 72197 MRI PELVIS W/O & W/DYE: CPT | Mod: TC

## 2024-05-17 PROCEDURE — 63600175 PHARM REV CODE 636 W HCPCS: Performed by: INTERNAL MEDICINE

## 2024-05-17 RX ORDER — GADOBUTROL 604.72 MG/ML
10 INJECTION INTRAVENOUS
Status: COMPLETED | OUTPATIENT
Start: 2024-05-17 | End: 2024-05-17

## 2024-05-17 RX ORDER — HEPARIN 100 UNIT/ML
5 SYRINGE INTRAVENOUS ONCE
Status: COMPLETED | OUTPATIENT
Start: 2024-05-17 | End: 2024-05-17

## 2024-05-17 RX ADMIN — GADOBUTROL 10 ML: 604.72 INJECTION INTRAVENOUS at 06:05

## 2024-05-17 RX ADMIN — HEPARIN 500 UNITS: 100 SYRINGE at 06:05

## 2024-05-17 NOTE — NURSING
MRI complete at this time, patient tolerated MRI well, port disconnected from MRI power injector, port flushed with 10 ml NS and hep locked with 5 ml Heparin, Greene needle removed without difficulty, site dressed with sterile gauze and Tegaderm, patient discharged in NAD.

## 2024-05-17 NOTE — NURSING
patient arrived for MRI with port already accessed, patient identification verified X 2, allergies reviewed, port with good blood return and flushes easily, port connected to MRI power injector using aseptic technique, will de-access port when MRI complete.

## 2024-05-20 ENCOUNTER — PATIENT MESSAGE (OUTPATIENT)
Dept: UROLOGY | Facility: CLINIC | Age: 44
End: 2024-05-20
Payer: COMMERCIAL

## 2024-05-20 ENCOUNTER — PATIENT MESSAGE (OUTPATIENT)
Dept: GASTROENTEROLOGY | Facility: CLINIC | Age: 44
End: 2024-05-20
Payer: COMMERCIAL

## 2024-05-21 ENCOUNTER — PATIENT MESSAGE (OUTPATIENT)
Dept: INTERNAL MEDICINE | Facility: CLINIC | Age: 44
End: 2024-05-21
Payer: COMMERCIAL

## 2024-05-21 ENCOUNTER — PATIENT MESSAGE (OUTPATIENT)
Dept: REHABILITATION | Facility: HOSPITAL | Age: 44
End: 2024-05-21

## 2024-05-21 ENCOUNTER — TELEPHONE (OUTPATIENT)
Dept: UROLOGY | Facility: CLINIC | Age: 44
End: 2024-05-21
Payer: COMMERCIAL

## 2024-05-21 ENCOUNTER — LAB VISIT (OUTPATIENT)
Dept: LAB | Facility: HOSPITAL | Age: 44
End: 2024-05-21
Attending: INTERNAL MEDICINE
Payer: COMMERCIAL

## 2024-05-21 DIAGNOSIS — N39.0 RECURRENT UTI: ICD-10-CM

## 2024-05-21 LAB
BACTERIA #/AREA URNS AUTO: ABNORMAL /HPF
BILIRUB UR QL STRIP: NEGATIVE
CLARITY UR REFRACT.AUTO: ABNORMAL
COLOR UR AUTO: YELLOW
GLUCOSE UR QL STRIP: NEGATIVE
HGB UR QL STRIP: ABNORMAL
HYALINE CASTS UR QL AUTO: ABNORMAL /LPF
KETONES UR QL STRIP: NEGATIVE
LEUKOCYTE ESTERASE UR QL STRIP: ABNORMAL
MICROSCOPIC COMMENT: ABNORMAL
NITRITE UR QL STRIP: NEGATIVE
PH UR STRIP: 6 [PH] (ref 5–8)
PROT UR QL STRIP: ABNORMAL
RBC #/AREA URNS AUTO: 81 /HPF (ref 0–4)
SP GR UR STRIP: 1.02 (ref 1–1.03)
URN SPEC COLLECT METH UR: ABNORMAL
WBC #/AREA URNS AUTO: >100 /HPF (ref 0–5)

## 2024-05-21 PROCEDURE — 87077 CULTURE AEROBIC IDENTIFY: CPT | Performed by: INTERNAL MEDICINE

## 2024-05-21 PROCEDURE — 81001 URINALYSIS AUTO W/SCOPE: CPT | Performed by: INTERNAL MEDICINE

## 2024-05-21 PROCEDURE — 87088 URINE BACTERIA CULTURE: CPT | Performed by: INTERNAL MEDICINE

## 2024-05-21 PROCEDURE — 87086 URINE CULTURE/COLONY COUNT: CPT | Performed by: INTERNAL MEDICINE

## 2024-05-21 PROCEDURE — 87186 SC STD MICRODIL/AGAR DIL: CPT | Performed by: INTERNAL MEDICINE

## 2024-05-21 NOTE — TELEPHONE ENCOUNTER
"Called pt to schedule appointment. Soonest available is currently June 10th. Pt declined appointment stating "I will just go to the emergency room" before she terminated call.     ----- Message from Nick Keyes sent at 5/21/2024 10:02 AM CDT -----  Regarding: Appointment  Name of Who is Calling:  Patient          What is the request in detail:  Patient needed to make an appointment and the first available appointment was for 06/07/2024. Patient stated she has fleshy stuff coming out when urinating            Can the clinic reply by [facility]: Yes            What Number to Call Back if not [facility]:[phone]  "

## 2024-05-21 NOTE — TELEPHONE ENCOUNTER
Spoke with Ivy:  IBD meds:  - Entyvio q8w, LD: 5/17 (due 5/6), ND: 7/5/24 (scheduled)    Passing tissue in urine, requesting MRI results    - stayed home from work today d/t low grade fever 100.2 and chills  - passing tissue with frequent urination - see pic  - hx of entero-vesicular fistula with abdominal abscess in 1992, 1998   - submitted urine sample today for standing UA order  - was due for Entyvio 5/6, pt cancelled without notification rescheduled to 5/9, cancelled d/t not feeling well (heart palpitations, EKG 5/8), completed this dose on 5/17  - to be on schedule, she'd be due 7/1  - ostomy outpt normal per report, less appetite so less output but pt not concerned  - intermittent RLQ pain, 7/10 at worst, can be sharp or feel like a heavy pressure  - denies blood and mucous  - reviewed MRI results    Dr. Garcia will be updated.

## 2024-05-22 ENCOUNTER — TELEPHONE (OUTPATIENT)
Dept: GASTROENTEROLOGY | Facility: CLINIC | Age: 44
End: 2024-05-22
Payer: COMMERCIAL

## 2024-05-22 ENCOUNTER — TELEPHONE (OUTPATIENT)
Dept: INFECTIOUS DISEASES | Facility: CLINIC | Age: 44
End: 2024-05-22
Payer: COMMERCIAL

## 2024-05-22 ENCOUNTER — PATIENT MESSAGE (OUTPATIENT)
Dept: GASTROENTEROLOGY | Facility: CLINIC | Age: 44
End: 2024-05-22
Payer: COMMERCIAL

## 2024-05-22 ENCOUNTER — PATIENT MESSAGE (OUTPATIENT)
Dept: INTERNAL MEDICINE | Facility: CLINIC | Age: 44
End: 2024-05-22
Payer: COMMERCIAL

## 2024-05-22 DIAGNOSIS — N39.0 RECURRENT UTI: Primary | ICD-10-CM

## 2024-05-22 RX ORDER — NITROFURANTOIN 25; 75 MG/1; MG/1
100 CAPSULE ORAL 2 TIMES DAILY
Qty: 10 CAPSULE | Refills: 0 | Status: SHIPPED | OUTPATIENT
Start: 2024-05-22 | End: 2024-05-28

## 2024-05-22 NOTE — TELEPHONE ENCOUNTER
Called patient and discussed MRE results which show short-segmented stricture with signs of mild active inflammation adjacent to the RLQ surgical anastomosis with tethering and distortion makes an underlying fistula difficult to exclude.     Symptoms- 5 bags of ileostomy output daily  Meds: imodium 1 tab BID and oxycodone twice in past 10 days    Plan  - reminded pt low residue and full liquid diet for now  - urgent ileoscopy through stoma with slim peds scope to see if we can evaluate MRE findings  - will coordinate care also with CRS, Dr. Parsons  - continue entyvio  - encouraged to see urology for recurrent UTIs given unclear etiology- no obvious fistula from SB to bladder on imaging

## 2024-05-23 ENCOUNTER — PATIENT MESSAGE (OUTPATIENT)
Dept: GASTROENTEROLOGY | Facility: CLINIC | Age: 44
End: 2024-05-23
Payer: COMMERCIAL

## 2024-05-23 LAB — BACTERIA UR CULT: ABNORMAL

## 2024-05-24 ENCOUNTER — TELEPHONE (OUTPATIENT)
Dept: ENDOSCOPY | Facility: HOSPITAL | Age: 44
End: 2024-05-24
Payer: COMMERCIAL

## 2024-05-24 NOTE — TELEPHONE ENCOUNTER
Contacted the patient to schedule an endoscopy procedure(s) 5/24/24. The patient did not answer the call and left a voice message requesting a call back.

## 2024-05-24 NOTE — TELEPHONE ENCOUNTER
"----- Message from Peace Garcia MD sent at 5/22/2024 12:57 PM CDT -----  Procedure: Ileoscopy through stoma    Diagnosis: Nausea/Vomiting    Procedure Timing: Within 4 weeks (Urgent)    #If within 4 weeks selected, please martina as high priority#    #If greater than 12 weeks, please select "4-12 weeks" and delay sending until 2 months prior to requested date#     Provider: Myself    Location: [address]    Additional Scheduling Information: PEDIATRIC SLIM SCOPE, 20 MIN PROCEDURE AND FIT IN URGENTLY ON MY SCHEDULE MAY OR EARLY JUNE, BRING EXTRA STOMA SUPPLIES    Prep Specifications: clear liquids day before scope    Have you attached a patient to this message: Yes  "

## 2024-05-27 ENCOUNTER — TELEPHONE (OUTPATIENT)
Dept: ENDOSCOPY | Facility: HOSPITAL | Age: 44
End: 2024-05-27
Payer: COMMERCIAL

## 2024-05-27 VITALS — BODY MASS INDEX: 21.44 KG/M2 | HEIGHT: 61 IN | WEIGHT: 113.56 LBS

## 2024-05-27 NOTE — TELEPHONE ENCOUNTER
"---- Message from Peace Garcia MD sent at 5/22/2024 12:57 PM CDT -----  Procedure: Ileoscopy through stoma     Diagnosis: Nausea/Vomiting     Procedure Timing: Within 4 weeks (Urgent)     #If within 4 weeks selected, please martina as high priority#     #If greater than 12 weeks, please select "4-12 weeks" and delay sending until 2 months prior to requested date#      Provider: Myself     Location: [address]     Additional Scheduling Information: PEDIATRIC SLIM SCOPE, 20 MIN PROCEDURE AND FIT IN URGENTLY ON MY SCHEDULE MAY OR EARLY JUNE, BRING EXTRA STOMA SUPPLIES     Prep Specifications: clear liquids day before scope     Have you attached a patient to this message: Yes  "

## 2024-05-27 NOTE — TELEPHONE ENCOUNTER
Spoke to Ivy to schedule procedure(s) Ileoscopy through Stoma       Physician to perform procedure(s) Dr. SACHI Garcia  Date of Procedure (s) 6/4/24  Arrival Time 11:00 AM  Time of Procedure(s) 12:00 PM   Location of Procedure(s) [place] [address]  Type of Rx Prep sent to patient: Miralax  Instructions provided to patient via [facility]    Patient was informed on the following information and verbalized understanding. Screening questionnaire reviewed with patient and complete. If procedure requires anesthesia, a responsible adult needs to be present to accompany the patient home, patient cannot drive after receiving anesthesia. Appointment details are tentative, especially check-in time. Patient will receive a prep-op call 7 days prior to confirm check-in time for procedure. If applicable the patient should contact their pharmacy to verify Rx for procedure prep is ready for pick-up. Patient was advised to call the scheduling department at [phone] if pharmacy states no Rx is available. Patient was advised to call the endoscopy scheduling department if any questions or concerns arise.       Endoscopy Scheduling Department

## 2024-05-28 ENCOUNTER — CLINICAL SUPPORT (OUTPATIENT)
Dept: REHABILITATION | Facility: HOSPITAL | Age: 44
End: 2024-05-28
Payer: COMMERCIAL

## 2024-05-28 DIAGNOSIS — G89.29 CHRONIC LEFT SHOULDER PAIN: ICD-10-CM

## 2024-05-28 DIAGNOSIS — M25.612 DECREASED RANGE OF MOTION OF LEFT SHOULDER: Primary | ICD-10-CM

## 2024-05-28 DIAGNOSIS — M25.512 CHRONIC LEFT SHOULDER PAIN: ICD-10-CM

## 2024-05-28 DIAGNOSIS — Z98.890 S/P SHOULDER SURGERY: ICD-10-CM

## 2024-05-28 DIAGNOSIS — F41.9 ANXIETY: ICD-10-CM

## 2024-05-28 PROCEDURE — 97140 MANUAL THERAPY 1/> REGIONS: CPT

## 2024-05-28 PROCEDURE — 97112 NEUROMUSCULAR REEDUCATION: CPT

## 2024-05-28 PROCEDURE — 97530 THERAPEUTIC ACTIVITIES: CPT

## 2024-05-28 RX ORDER — ZOLPIDEM TARTRATE 10 MG/1
10 TABLET ORAL NIGHTLY
Qty: 30 TABLET | Refills: 2 | Status: SHIPPED | OUTPATIENT
Start: 2024-05-28

## 2024-05-28 RX ORDER — CYCLOBENZAPRINE HCL 10 MG
10 TABLET ORAL NIGHTLY
Qty: 30 TABLET | Refills: 0 | Status: SHIPPED | OUTPATIENT
Start: 2024-05-28

## 2024-05-28 NOTE — TELEPHONE ENCOUNTER
No care due was identified.  [facility] Embedded Care Due Messages. Reference number: [id number].   5/28/2024 10:56:31 AM CDT  
WDL

## 2024-05-29 ENCOUNTER — TELEPHONE (OUTPATIENT)
Dept: ENDOSCOPY | Facility: HOSPITAL | Age: 44
End: 2024-05-29
Payer: COMMERCIAL

## 2024-05-29 NOTE — PROGRESS NOTES
[facility]   Physical Therapy Treatment Note     Name: Ivy Salgado  Clinic Number: [id number]    Therapy Diagnosis:   Encounter Diagnosis   Name Primary?    Decreased range of motion of left shoulder Yes         Physician: Mono Giles III, *    Visit Date: 5/28/2024     Evaluation Date: 8/8/2023  Authorization Period Expiration: 7/6/2024  Plan of Care Expiration: 12/31/2023  Progress Note Due: 9/10/2023  Visit # / Visits authorized: 20/ 20  Foto: 3/3     Time In: 1500  Time Out: 1600  Total Billable Time: 60 minutes     DOS: 7/18/2023  S/p: left  1. Total shoulder arthroplasty (complex, -22 modifier)  2. Shoulder lysis of adhesions  3. Shoulder subpectoral biceps tenodesis (CPT 45969)  Post-Op Precautions: We will follow the shoulder arthroplasty guidelines. The patient remained in a sling for 6 weeks. We will start PT at the 3-week martina.       Precautions: none    SUBJECTIVE     Pt reports: she has had a progression of her abdominal symptoms and needs to get a scope next week so will need to cancel her appointment. Her shoulder is feeling really stiff today     She was compliant with home exercise program.  Response to previous treatment: improvement in pain at rest   Functional change: able to ttol reaching overhead a bit better    Pain: 3/10  Location: left shoulder      OBJECTIVE     Shoulder depression L moderate  Mild dec in pec and lab mobility   Mild hypomobile post GHJ     Treatment     Ivy received the treatments listed below:        Manual therapy techniques: Joint mobilizations and Soft tissue Mobilization were applied to the: L shoulder for 10 minutes, including:  Thoracic manip  Post GHJ mobs   Lat STM with flexion range       Neuromuscular re-education activities to improve: motor control for 15 min  Hand heel rocks 30x   UT level 1 on wall     Therapeutic activities to improve functional performance for 30  minutes, including:  UBE 3m forward / 3m backward L UE  only lvl 4.5  Thoracic rotations 15x B   Bicep curls 5# 4x10  Landmine 3# with UT focus at end range 3x10     Patient Education and Home Exercises     Home Exercises Provided and Patient Education Provided     Education provided:   - Add new exercises     Written Home Exercises Provided: yes. Exercises were reviewed and Ivy was able to demonstrate them prior to the end of the session.  Ivy demonstrated good  understanding of the education provided. See EMR under Patient Instructions for exercises provided during therapy sessions    ASSESSMENT     The patient will likely need to take 2 weeks off of formal PT to recover from her procedure on 6/4. Advised that at this time, she is likely strong enough to complete chest compressions for CPR but she should practice on dummies increasing intensity each time she attempts it until she is able to complete the necessary cycles she will need to do for her job. The patient advised to discuss her return to full work with the physician as she has achieved most goals and is stronger than she was pre-op.     Ivy Is progressing well towards her goals.     Pt prognosis is Fair.     Pt will continue to benefit from skilled outpatient physical therapy to address the deficits listed in the problem list box on initial evaluation, provide pt/family education and to maximize pt's level of independence in the home and community environment.     Pt's spiritual, cultural and educational needs considered and pt agreeable to plan of care and goals.     Anticipated barriers to physical therapy: none     Goals:   Short Term Goals: 12 weeks  1. Pt will be compliant with HEP 50% of prescribed amount. met  2. The pt to demo improvement in R shoulder PROM to by 80% of the L met  3.  The pt to demo tolerance to being out of the sling for 24 hours with pain <2/10 met     Long Term Goals:  36 weeks   Pt will be compliant with % of prescribed amount.  met  The pt to improvement in AROM of  L shoulder within 80% of R shoulder to improve tolerance to OH movement  met  The pt to  Demo at least 4+/5 of RTC muscle testing to demo improvement in tolerance to activity progressing, not met  The pt to tolerate lifting 50# from the ground to waist height per job requirement description progressing, not met  The pt will report full participation in ADLs and IADLs without restrictions related to L Shoulder.  progressing, not met    PLAN     Follow TSA procedure     Dorothy Basurto, PT,

## 2024-05-29 NOTE — PLAN OF CARE
[facility]   Physical Therapy Treatment Note     Name: Ivy Salgado  Clinic Number: [id number]    Therapy Diagnosis:   Encounter Diagnosis   Name Primary?    Decreased range of motion of left shoulder Yes         Physician: Mono Giles III, *    Visit Date: 5/28/2024     Evaluation Date: 8/8/2023  Authorization Period Expiration: 7/6/2024  Plan of Care Expiration: 12/31/2024  Progress Note Due: 6/29/2024  Visit # / Visits authorized: 20/ 20  Foto: 3/3      Time In: 1500  Time Out: 1600  Total Billable Time: 60 minutes     DOS: 7/18/2023  S/p: left  1. Total shoulder arthroplasty (complex, -22 modifier)  2. Shoulder lysis of adhesions  3. Shoulder subpectoral biceps tenodesis (CPT 95509)  Post-Op Precautions: We will follow the shoulder arthroplasty guidelines. The patient remained in a sling for 6 weeks. We will start PT at the 3-week martina.       Precautions: none    SUBJECTIVE     Pt reports: she has had a progression of her abdominal symptoms and needs to get a scope next week so will need to cancel her appointment. Her shoulder is feeling really stiff today     She was compliant with home exercise program.  Response to previous treatment: improvement in pain at rest   Functional change: able to ttol reaching overhead a bit better    Pain: 3/10  Location: left shoulder      OBJECTIVE     Shoulder depression L moderate  Mild dec in pec and lab mobility   Mild hypomobile post GHJ     Treatment     Ivy received the treatments listed below:        Manual therapy techniques: Joint mobilizations and Soft tissue Mobilization were applied to the: L shoulder for 10 minutes, including:  Thoracic manip  Post GHJ mobs   Lat STM with flexion range       Neuromuscular re-education activities to improve: motor control for 15 min  Hand heel rocks 30x   UT level 1 on wall     Therapeutic activities to improve functional performance for 30  minutes, including:  UBE 3m forward / 3m backward L  UE only lvl 4.5  Thoracic rotations 15x B   Bicep curls 5# 4x10  Landmine 3# with UT focus at end range 3x10     Patient Education and Home Exercises     Home Exercises Provided and Patient Education Provided     Education provided:   - Add new exercises     Written Home Exercises Provided: yes. Exercises were reviewed and Ivy was able to demonstrate them prior to the end of the session.  Ivy demonstrated good  understanding of the education provided. See EMR under Patient Instructions for exercises provided during therapy sessions    ASSESSMENT     The patient will likely need to take 2 weeks off of formal PT to recover from her procedure on 6/4. Advised that at this time, she is likely strong enough to complete chest compressions for CPR but she should practice on dummies increasing intensity each time she attempts it until she is able to complete the necessary cycles she will need to do for her job. The patient advised to discuss her return to full work with the physician as she has achieved most goals and is stronger than she was pre-op.     Ivy Is progressing well towards her goals.     Pt prognosis is Fair.     Pt will continue to benefit from skilled outpatient physical therapy to address the deficits listed in the problem list box on initial evaluation, provide pt/family education and to maximize pt's level of independence in the home and community environment.     Pt's spiritual, cultural and educational needs considered and pt agreeable to plan of care and goals.     Anticipated barriers to physical therapy: none     Goals:   Short Term Goals: 12 weeks  1. Pt will be compliant with HEP 50% of prescribed amount. met  2. The pt to demo improvement in R shoulder PROM to by 80% of the L met  3.  The pt to demo tolerance to being out of the sling for 24 hours with pain <2/10 met     Long Term Goals:  36 weeks   Pt will be compliant with % of prescribed amount.  met  The pt to improvement in AROM  of L shoulder within 80% of R shoulder to improve tolerance to OH movement  met  The pt to  Demo at least 4+/5 of RTC muscle testing to demo improvement in tolerance to activity progressing, not met  The pt to tolerate lifting 50# from the ground to waist height per job requirement description progressing, not met  The pt will report full participation in ADLs and IADLs without restrictions related to L Shoulder.  progressing, not met    PLAN       Certification Period: 5/29/2024 to 7/24/2024  Recommended Treatment Plan: 1 times per week for 8 weeks: Manual Therapy, Neuromuscular Re-ed, Therapeutic Activities, and Therapeutic Exercise  Other Recommendations:     Therapist: Dorothy Basurto, PT, DPT, SCS, FAAOMPT    I CERTIFY THE NEED FOR THESE SERVICES FURNISHED UNDER THIS PLAN OF TREATMENT AND WHILE UNDER MY CARE    Physician's comments: ________________________________________________________________________________________________________________________________________________      Physician's Name: ___________________________________      Dorothy Basurto PT,

## 2024-06-04 ENCOUNTER — ANESTHESIA EVENT (OUTPATIENT)
Dept: ENDOSCOPY | Facility: HOSPITAL | Age: 44
End: 2024-06-04
Payer: COMMERCIAL

## 2024-06-04 ENCOUNTER — HOSPITAL ENCOUNTER (OUTPATIENT)
Facility: HOSPITAL | Age: 44
Discharge: HOME | End: 2024-06-04
Attending: INTERNAL MEDICINE | Admitting: INTERNAL MEDICINE
Payer: COMMERCIAL

## 2024-06-04 ENCOUNTER — ANESTHESIA (OUTPATIENT)
Dept: ENDOSCOPY | Facility: HOSPITAL | Age: 44
End: 2024-06-04
Payer: COMMERCIAL

## 2024-06-04 VITALS
OXYGEN SATURATION: 98 % | HEART RATE: 67 BPM | BODY MASS INDEX: 21.34 KG/M2 | RESPIRATION RATE: 15 BRPM | SYSTOLIC BLOOD PRESSURE: 107 MMHG | HEIGHT: 61 IN | WEIGHT: 113 LBS | TEMPERATURE: 98 F | DIASTOLIC BLOOD PRESSURE: 67 MMHG

## 2024-06-04 DIAGNOSIS — K50.018 CROHN'S DISEASE OF SMALL INTESTINE WITH OTHER COMPLICATION: Primary | ICD-10-CM

## 2024-06-04 DIAGNOSIS — K50.90 CROHN DISEASE: ICD-10-CM

## 2024-06-04 PROCEDURE — 25000003 PHARM REV CODE 250: Performed by: NURSE ANESTHETIST, CERTIFIED REGISTERED

## 2024-06-04 PROCEDURE — 44382 SMALL BOWEL ENDOSCOPY: CPT | Mod: ,,, | Performed by: INTERNAL MEDICINE

## 2024-06-04 PROCEDURE — 88305 TISSUE EXAM BY PATHOLOGIST: CPT | Performed by: PATHOLOGY

## 2024-06-04 PROCEDURE — 44382 SMALL BOWEL ENDOSCOPY: CPT | Performed by: INTERNAL MEDICINE

## 2024-06-04 PROCEDURE — 37000009 HC ANESTHESIA EA ADD 15 MINS: Performed by: INTERNAL MEDICINE

## 2024-06-04 PROCEDURE — E9220 PRA ENDO ANESTHESIA: HCPCS | Mod: ,,, | Performed by: NURSE ANESTHETIST, CERTIFIED REGISTERED

## 2024-06-04 PROCEDURE — 37000008 HC ANESTHESIA 1ST 15 MINUTES: Performed by: INTERNAL MEDICINE

## 2024-06-04 PROCEDURE — 25000003 PHARM REV CODE 250: Performed by: INTERNAL MEDICINE

## 2024-06-04 PROCEDURE — 63600175 PHARM REV CODE 636 W HCPCS: Performed by: NURSE ANESTHETIST, CERTIFIED REGISTERED

## 2024-06-04 PROCEDURE — 88305 TISSUE EXAM BY PATHOLOGIST: CPT | Mod: 26,,, | Performed by: PATHOLOGY

## 2024-06-04 RX ORDER — LIDOCAINE HYDROCHLORIDE 20 MG/ML
INJECTION INTRAVENOUS
Status: DISCONTINUED | OUTPATIENT
Start: 2024-06-04 | End: 2024-06-04

## 2024-06-04 RX ORDER — SODIUM CHLORIDE 9 MG/ML
INJECTION, SOLUTION INTRAVENOUS CONTINUOUS
Status: DISCONTINUED | OUTPATIENT
Start: 2024-06-04 | End: 2024-06-04 | Stop reason: HOSPADM

## 2024-06-04 RX ORDER — PROPOFOL 10 MG/ML
VIAL (ML) INTRAVENOUS
Status: DISCONTINUED | OUTPATIENT
Start: 2024-06-04 | End: 2024-06-04

## 2024-06-04 RX ORDER — PROPOFOL 10 MG/ML
INJECTION, EMULSION INTRAVENOUS CONTINUOUS PRN
Status: DISCONTINUED | OUTPATIENT
Start: 2024-06-04 | End: 2024-06-04

## 2024-06-04 RX ADMIN — LIDOCAINE HYDROCHLORIDE 100 MG: 20 INJECTION INTRAVENOUS at 12:06

## 2024-06-04 RX ADMIN — SODIUM CHLORIDE: 0.9 INJECTION, SOLUTION INTRAVENOUS at 11:06

## 2024-06-04 RX ADMIN — PROPOFOL 200 MCG/KG/MIN: 10 INJECTION, EMULSION INTRAVENOUS at 12:06

## 2024-06-04 RX ADMIN — PROPOFOL 50 MG: 10 INJECTION, EMULSION INTRAVENOUS at 12:06

## 2024-06-04 NOTE — TRANSFER OF CARE
Anesthesia Transfer of Care Note    Patient: Ivy Salgado    Procedure(s) Performed: Procedure(s) (LRB):  ILEOSCOPY (N/A)    Patient location: GI    Anesthesia Type: general    Transport from OR: Transported from OR on room air with adequate spontaneous ventilation    Post pain: adequate analgesia    Post assessment: no apparent anesthetic complications    Post vital signs: stable    Level of consciousness: responds to stimulation and sedated    Nausea/Vomiting: no nausea/vomiting    Complications: none    Transfer of care protocol was followed      Last vitals: Visit Vitals  BP 95/58   Pulse 68   Temp 36   Resp 16   Ht    Wt    LMP    SpO2 99%   Breastfeeding    BMI

## 2024-06-04 NOTE — PLAN OF CARE
Patient ready for discharge criteria met. Written and verbal Discharge instructions reviewed with patient and family. Understanding verbalized.

## 2024-06-04 NOTE — [employer] PATIENT INSTRUCTIONS
Discharge Summary/Instructions after an Endoscopic Procedure  Patient Name: Ivy Salgado  Patient MRN: [id number]  Patient YOB: 1980 Tuesday, June 4, 2024  Peace Garcia MD  Dear patient,  As a result of recent federal legislation (The Federal Cures Act), you may   receive lab or pathology results from your procedure in your [facility]   account before your physician is able to contact you. Your physician or   their representative will relay the results to you with their   recommendations at their soonest availability.  Thank you,  RESTRICTIONS:  During your procedure today, you received medications for sedation.  These   medications may affect your judgment, balance and coordination.  Therefore,   for 24 hours, you have the following restrictions:   - DO NOT drive a car, operate machinery, make legal/financial decisions,   sign important papers or drink alcohol.    ACTIVITY:  Today: no heavy lifting, straining or running due to procedural   sedation/anesthesia.  The following day: return to full activity including work.  DIET:  Eat and drink normally unless instructed otherwise.     TREATMENT FOR COMMON SIDE EFFECTS:  - Mild abdominal pain, nausea, belching, bloating or excessive gas:  rest,   eat lightly and use a heating pad.  - Sore Throat: treat with throat lozenges and/or gargle with warm salt   water.  - Because air was used during the procedure, expelling large amounts of air   from your rectum or belching is normal.  - If a bowel prep was taken, you may not have a bowel movement for 1-3 days.    This is normal.  SYMPTOMS TO WATCH FOR AND REPORT TO YOUR PHYSICIAN:  1. Abdominal pain or bloating, other than gas cramps.  2. Chest pain.  3. Back pain.  4. Signs of infection such as: chills or fever occurring within 24 hours   after the procedure.  5. Rectal bleeding, which would show as bright red, maroon, or black stools.   (A tablespoon of blood from the rectum is not serious, especially if    hemorrhoids are present.)  6. Vomiting.  7. Weakness or dizziness.  GO DIRECTLY TO THE NEAREST EMERGENCY ROOM IF YOU HAVE ANY OF THE FOLLOWING:      Difficulty breathing              Chills and/or fever over 101 F   Persistent vomiting and/or vomiting blood   Severe abdominal pain   Severe chest pain   Black, tarry stools   Bleeding- more than one tablespoon   Any other symptom or condition that you feel may need urgent attention  Your doctor recommends these additional instructions:  If any biopsies were taken, your doctors clinic will contact you in 1 to 2   weeks with any results.  - Discharge patient to home.   - Patient has a contact number available for emergencies.  The signs and   symptoms of potential delayed complications were discussed with the   patient.  Return to normal activities tomorrow.  Written discharge   instructions were provided to the patient.   - Resume previous diet.   - Await pathology results.   For questions, problems or results please call your physician - Peace Garcia MD at Work:  [phone].  [facility] [place], EMERGENCY ROOM PHONE NUMBER: [phone]  IF A COMPLICATION OR EMERGENCY SITUATION ARISES AND YOU ARE UNABLE TO REACH   YOUR PHYSICIAN - GO DIRECTLY TO THE EMERGENCY ROOM.  Peace Garcia MD  6/4/2024 12:18:31 PM  This report has been verified and signed electronically.  Dear patient,  As a result of recent federal legislation (The Federal Cures Act), you may   receive lab or pathology results from your procedure in your [facility]   account before your physician is able to contact you. Your physician or   their representative will relay the results to you with their   recommendations at their soonest availability.  Thank you,  [employer]ATION

## 2024-06-04 NOTE — ANESTHESIA PREPROCEDURE EVALUATION
06/04/2024  Ivy Salgado is a 43 y.o., female. Patient states many of her drug allergies are not true allergies, but drugs she is to avoid due to prolonged QT interval. Sees Dr. Monsalve for cardiac    Past Medical History:   Diagnosis Date    Abnormal Pap smear 2007    Alkaline phosphatase elevation- based on lab from 4/9/2019 05/28/2019    Arthritis     Avascular necrosis of bone of hip, left     Bacterial vaginosis     Depression 08/05/2017    Drug-induced pancreatitis (imuran)     Generalized anxiety disorder     Genital HSV     GERD (gastroesophageal reflux disease)     Hypertension     Ileostomy in place 07/09/2012    Kidney stone     Long QT syndrome     Melanoma in situ (excised-buttocks 2018, para-stomal site 2019)     Moderate episode of recurrent major depressive disorder 02/02/2024    Multiple thyroid nodules 11/27/2018    Osteopenia of multiple sites 01/07/2019    Pancreas cyst (tail, 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)     Recurrent Clostridioides difficile diarrhea     Recurrent UTI 04/03/2013     Patient Active Problem List   Diagnosis    Ileostomy in place    Crohn's disease of ileum with end ileostomy    Generalized anxiety disorder    Genital HSV    Joint pain    Hypertension    High output ileostomy    Vitamin D deficiency    Multiple thyroid nodules    Osteopenia of multiple sites    GERD (gastroesophageal reflux disease)    Alkaline phosphatase elevation- based on lab from 4/9/2019     History of recurrent UTI (urinary tract infection)    Pelvic pain in female    Ureteral stone    History of prolonged Q-T interval on ECG    Biceps tendonitis on left    Decreased range of motion of left shoulder    Moderate episode of recurrent major depressive disorder    Recurrent Clostridioides difficile diarrhea    Kidney stone    Melanoma in situ (excised-buttocks 2018, para-stomal site 2019)    Long QT syndrome     Pancreas cyst (tail, 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)    Bacterial vaginosis    Drug-induced pancreatitis (imuran)    Avascular necrosis of bone of hip, left    Dysuria     Review of patient's allergies indicates:   Allergen Reactions    Azathioprine sodium Other (See Comments)     Other reaction(s): pancreatitis  Other reaction(s): pancreatitis    Methotrexate analogues Other (See Comments)     leukopenia    Stelara [ustekinumab] Other (See Comments)     Multiple infections    Zofran [ondansetron hcl (pf)] Other (See Comments)     Per patient causes prolong QT    Vancomycin analogues Other (See Comments)     Made her red    Azathioprine      Other reaction(s): Unknown    Methotrexate      Other reaction(s): infection-    Morphine Itching and Other (See Comments)     Other reaction(s): Itching    Zofran [ondansetron hcl]      Other reaction(s): Hives    Bactrim [sulfamethoxazole-trimethoprim] Palpitations    Ciprofloxacin Palpitations     Social History     Socioeconomic History    Marital status: Single   Occupational History     Employer: [employer]   Tobacco Use    Smoking status: Never    Smokeless tobacco: Never   Substance and Sexual Activity    Alcohol use: Not Currently     Alcohol/week: 0.0 standard drinks of alcohol    Drug use: No    Sexual activity: Yes     Partners: Male     Birth control/protection: See Surgical Hx     Comment: HYST   Other Topics Concern    Are you pregnant or think you may be? No    Breast-feeding No     Social Determinants of Health     Financial Resource Strain: Low Risk  (4/18/2024)    Overall Financial Resource Strain (CARDIA)     Difficulty of Paying Living Expenses: Not hard at all   Food Insecurity: No Food Insecurity (4/18/2024)    Hunger Vital Sign     Worried About Running Out of Food in the Last Year: Never true     Ran Out of Food in the Last Year: Never true   Transportation Needs: No Transportation Needs (4/18/2024)    PRAPARE - Transportation     Lack of Transportation  (Medical): No     Lack of Transportation (Non-Medical): No   Physical Activity: Insufficiently Active (1/17/2024)    Exercise Vital Sign     Days of Exercise per Week: 3 days     Minutes of Exercise per Session: 30 min   Stress: No Stress Concern Present (4/18/2024)    [employer] [place] [employer] - Occupational Stress Questionnaire     Feeling of Stress : Not at all   Housing Stability: Low Risk  (4/18/2024)    Housing Stability Vital Sign     Unable to Pay for Housing in the Last Year: No     Homeless in the Last Year: No     Past Surgical History:   Procedure Laterality Date    ABDOMINAL SURGERY      APPENDECTOMY      ARTHROPLASTY OF SHOULDER Left 7/18/2023    Procedure: ARTHROPLASTY, SHOULDER;  Surgeon: Sharon Babb MD;  Location: [facility] OR;  Service: Orthopedics;  Laterality: Left;    AUGMENTATION OF BREAST Bilateral 06/2022    gel implants    BILATERAL SALPINGO-OOPHORECTOMY (BSO) Bilateral 05/30/2019    Procedure: SALPINGO-OOPHORECTOMY, BILATERAL;  Surgeon: Rupa German MD;  Location: [address];  Service: OB/GYN;  Laterality: Bilateral;    BLADDER SURGERY      partial cystectomy due to fistula    breast lift      BREAST SURGERY      [facility]      COLON SURGERY      COLONOSCOPY      CYSTOSCOPY  09/23/2020    Procedure: CYSTOSCOPY;  Surgeon: Sascha Florentino MD;  Location: [address];  Service: Urology;;    CYSTOSCOPY W/ URETERAL STENT PLACEMENT Left 09/15/2020    Procedure: CYSTOSCOPY, WITH URETERAL STENT INSERTION;  Surgeon: Sascha Florentino MD;  Location: [address];  Service: Urology;  Laterality: Left;    DIAGNOSTIC LAPAROSCOPY N/A 07/09/2020    Procedure: LAPAROSCOPY, DIAGNOSTIC;  Surgeon: Gilson El MD;  Location: [facility];  Service: OB/GYN;  Laterality: N/A;    ESOPHAGOGASTRODUODENOSCOPY N/A 1/3/2024    Procedure: EGD (ESOPHAGOGASTRODUODENOSCOPY);  Surgeon: Lily Lind MD;  Location: [facility] ENDO [address]);  Service: Endoscopy;  Laterality: N/A;    EXCISION OF  MELANOMA  07/17/2019    ILEOSCOPY N/A 1/3/2024    Procedure: ILEOSCOPY;  Surgeon: Lily Lind MD;  Location: [facility] ENDO (2ND FLR);  Service: Endoscopy;  Laterality: N/A;    ILEOSCOPY N/A 1/24/2024    Procedure: ILEOSCOPY;  Surgeon: Lilian Navarro MD;  Location: [facility] ENDO (2ND FLR);  Service: Endoscopy;  Laterality: N/A;  through stoma    ILEOSTOMY      LAPAROSCOPIC LYSIS OF ADHESIONS N/A 07/09/2020    Procedure: LYSIS, ADHESIONS, LAPAROSCOPIC;  Surgeon: Gilson El MD;  Location: [facility] OR;  Service: OB/GYN;  Laterality: N/A;    LASER LITHOTRIPSY  09/23/2020    Procedure: LITHOTRIPSY, USING LASER;  Surgeon: Sascha Florentino MD;  Location: [address];  Service: Urology;;    LYSIS OF ADHESIONS N/A 05/30/2019    Procedure: LYSIS, ADHESIONS;  Surgeon: Rupa German MD;  [address];  Service: OB/GYN;  Laterality: N/A;    OOPHORECTOMY Right 04/16/2015    PORTACATH PLACEMENT  02/21/2017    SKIN BIOPSY      SMALL INTESTINE SURGERY      age 18 Y    TOTAL ABDOMINAL HYSTERECTOMY  04/16/2015    TOTAL COLECTOMY      TUBAL LIGATION  06/06/2012    UPPER GASTROINTESTINAL ENDOSCOPY      URETEROSCOPIC REMOVAL OF URETERIC CALCULUS  09/23/2020    Procedure: REMOVAL, CALCULUS, URETER, URETEROSCOPIC;  Surgeon: Sascah Florentino MD;  Location: [address];  Service: Urology;;    URETEROSCOPY Left 09/23/2020    Procedure: URETEROSCOPY;  Surgeon: Sascha Florentino MD;  Location: [address];  Service: Urology;  Laterality: Left;     No current facility-administered medications on file prior to encounter.     Current Outpatient Medications on File Prior to Encounter   Medication Sig Dispense Refill    diphenoxylate-atropine 2.5-0.025 mg/5 ml (LOMOTIL) 2.5-0.025 mg/5 mL liquid Take 2.5-5 ml up to 4 times a day - dose might change at visit on 1/30 300 mL 0    estradiol 0.025 mg/24 hr 0.025 mg/24 hr Place 1 patch onto the skin once a week. 4 patch 11    gabapentin (NEURONTIN) 300 MG capsule  Take 1 capsule (300 mg total) by mouth 2 (two) times daily. 60 capsule 5    loperamide (IMODIUM) 1 mg/7.5 mL solution Take 4 mg by mouth 3 (three) times daily as needed for Diarrhea.      multivitamin (THERAGRAN) per tablet Take 1 tablet by mouth once daily.      valACYclovir (VALTREX) 500 MG tablet Take 1 tablet (500 mg total) by mouth once daily. 90 tablet 3    vedolizumab (ENTYVIO) 300 mg SolR injection Inject 300 mg into the vein every 8 weeks.      albuterol (VENTOLIN HFA) 90 mcg/actuation inhaler Inhale 2 puffs into the lungs every 6 (six) hours as needed. Rescue (Patient not taking: Reported on 4/15/2024) 18 g 0    ipratropium (ATROVENT) 21 mcg (0.03 %) nasal spray use 2 sprays by Each Nostril route 2 (two) times daily as needed. (Patient not taking: Reported on 4/15/2024) 30 mL 0    tamsulosin (FLOMAX) 0.4 mg Cap Take 1 capsule (0.4 mg total) by mouth once daily. (Patient not taking: Reported on 4/15/2024) 30 capsule 1     Pre-op Assessment          Review of Systems  Cardiovascular:     Hypertension                                        Renal/:   renal calculi               Hepatic/GI:     GERD      Bowel Conditions:  Inflammatory Bowel Disease, Crohns        Musculoskeletal:  Arthritis               Psych:   anxiety depression                Physical Exam  General: Well nourished    Airway:  Mallampati: II / I  Mouth Opening: Normal  TM Distance: Normal  Tongue: Normal  Neck ROM: Normal ROM    Dental:  Intact        Anesthesia Plan  Type of Anesthesia, risks & benefits discussed:    Anesthesia Type: Gen Natural Airway  Intra-op Monitoring Plan: Standard ASA Monitors  Post Op Pain Control Plan: multimodal analgesia  Induction:  IV  ASA Score: 3  Day of Surgery Review of History & Physical: H&P Update referred to the surgeon/provider.    Ready For Surgery From Anesthesia Perspective.     .

## 2024-06-04 NOTE — H&P
Short Stay Endoscopy History and Physical    PCP - Luis Madden DO  Referring Physician - Peace Garcia MD  [address] [place] [address]    Procedure - ileoscopy through stoma  ASA - per anesthesia  Mallampati - per anesthesia  History of Anesthesia problems - no  Family history Anesthesia problems -  no   Plan of anesthesia - General    HPI  43 y.o. female  Reason for procedure:  Crohn's f/u  ROS:  Constitutional: No fevers, chills, No weight loss  CV: No chest pain  Pulm: No cough, No shortness of breath  GI: see HPI    Medical History:  has a past medical history of Abnormal Pap smear (2007), Alkaline phosphatase elevation- based on lab from 4/9/2019  (05/28/2019), Arthritis, Avascular necrosis of bone of hip, left, Bacterial vaginosis, Depression (08/05/2017), Drug-induced pancreatitis (imuran), Generalized anxiety disorder, Genital HSV, GERD (gastroesophageal reflux disease), Hypertension, Ileostomy in place (07/09/2012), Kidney stone, Long QT syndrome, Melanoma in situ (excised-buttocks 2018, para-stomal site 2019), Moderate episode of recurrent major depressive disorder (02/02/2024), Multiple thyroid nodules (11/27/2018), Osteopenia of multiple sites (01/07/2019), Pancreas cyst (tail, 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), Recurrent Clostridioides difficile diarrhea, and Recurrent UTI (04/03/2013).    Surgical History:  has a past surgical history that includes Total colectomy; Ileostomy; Colon surgery; Upper gastrointestinal endoscopy; Colonoscopy; [facility]; Appendectomy; Bladder surgery; Skin biopsy; Abdominal surgery; Oophorectomy (Right, 04/16/2015); Portacath placement (02/21/2017); Total abdominal hysterectomy (04/16/2015); Small intestine surgery; Tubal ligation (06/06/2012); Bilateral salpingo-oophorectomy (BSO) (Bilateral, 05/30/2019); Lysis of adhesions (N/A, 05/30/2019); Excision of melanoma (07/17/2019); Diagnostic laparoscopy (N/A, 07/09/2020); Laparoscopic lysis of adhesions (N/A, 07/09/2020);  Cystoscopy w/ ureteral stent placement (Left, 09/15/2020); Ureteroscopy (Left, 09/23/2020); Cystoscopy (09/23/2020); Laser lithotripsy (09/23/2020); Ureteroscopic removal of ureteric calculus (09/23/2020); breast lift; Breast surgery; Augmentation of breast (Bilateral, 06/2022); Arthroplasty of shoulder (Left, 7/18/2023); Esophagogastroduodenoscopy (N/A, 1/3/2024); Ileoscopy (N/A, 1/3/2024); and Ileoscopy (N/A, 1/24/2024).    Family History: family history includes Breast cancer (age of onset: 41) in her maternal cousin; Cancer in her father and maternal grandfather; Colon cancer in her father; Crohn's disease in her brother and daughter; Diabetes in her maternal grandfather and paternal grandfather; Endometrial cancer in her maternal aunt; Hearing loss in her paternal grandmother; Heart disease in her maternal grandfather; Hyperlipidemia in her father; Hypertension in her mother; Liver cancer in her father; Skin cancer in her maternal grandfather..     Social History:  reports that she has never smoked. She has never used smokeless tobacco. She reports that she does not currently use alcohol. She reports that she does not use drugs.    Review of patient's allergies indicates:   Allergen Reactions    Azathioprine sodium Other (See Comments)     Other reaction(s): pancreatitis  Other reaction(s): pancreatitis    Methotrexate analogues Other (See Comments)     leukopenia    Stelara [ustekinumab] Other (See Comments)     Multiple infections    Zofran [ondansetron hcl (pf)] Other (See Comments)     Per patient causes prolong QT    Vancomycin analogues Other (See Comments)     Made her red    Azathioprine      Other reaction(s): Unknown    Methotrexate      Other reaction(s): infection-    Morphine Itching and Other (See Comments)     Other reaction(s): Itching    Zofran [ondansetron hcl]      Other reaction(s): Hives    Bactrim [sulfamethoxazole-trimethoprim] Palpitations    Ciprofloxacin Palpitations       Medications:    Facility-Administered Medications Prior to Admission   Medication Dose Route Frequency Provider Last Rate Last Admin    estradiol valerate (DELESTROGEN) injection 20 mg/mL  20 mg Intramuscular Q30 Days Gilson El MD   20 mg at 12/30/22 0902     Medications Prior to Admission   Medication Sig Dispense Refill Last Dose    clonazePAM (KLONOPIN) 1 MG tablet Take 1 tablet (1 mg total) by mouth 2 (two) times daily. 60 tablet 2 Past Week    cyclobenzaprine (FLEXERIL) 10 MG tablet Take 1 tablet (10 mg total) by mouth every evening as needed for muscle pain 30 tablet 0 Past Week    diphenoxylate-atropine 2.5-0.025 mg/5 ml (LOMOTIL) 2.5-0.025 mg/5 mL liquid Take 2.5-5 ml up to 4 times a day - dose might change at visit on 1/30 300 mL 0 Past Week    droNABinol (MARINOL) 5 MG capsule Take 1 capsule (5 mg total) by mouth 2 (two) times daily before meals. 60 capsule 1 Past Week    estradiol 0.025 mg/24 hr 0.025 mg/24 hr Place 1 patch onto the skin once a week. 4 patch 11 6/4/2024    gabapentin (NEURONTIN) 300 MG capsule Take 1 capsule (300 mg total) by mouth 2 (two) times daily. 60 capsule 5 Past Week    loperamide (IMODIUM) 1 mg/7.5 mL solution Take 4 mg by mouth 3 (three) times daily as needed for Diarrhea.   Past Week    methenamine (HIPREX) 1 gram Tab Take 1 tablet (1 g total) by mouth 2 (two) times daily. 60 tablet 1 Past Week    mirtazapine (REMERON SOL-TAB) 30 MG disintegrating tablet Take 1 tablet (30 mg total) by mouth nightly. 30 tablet 0 Past Week    multivitamin (THERAGRAN) per tablet Take 1 tablet by mouth once daily.   Past Week    nadoloL (CORGARD) 40 MG tablet Take 1 tablet (40 mg total) by mouth once daily. Patient needs appointment before next refill. 90 tablet 7 Past Week    oxyCODONE (ROXICODONE) 5 MG immediate release tablet Take 1 tablet (5 mg total) by mouth every 4 (four) hours as needed for Pain. 15 tablet 0 Past Week    pantoprazole (PROTONIX) 40 MG tablet Take 1 tablet (40 mg total) by mouth 2  (two) times daily. 60 tablet 12 Past Week    promethazine (PHENERGAN) 25 MG tablet Take 1 tablet (25 mg total) by mouth every 6 (six) hours as needed for Nausea. 30 tablet 0 Past Week    valACYclovir (VALTREX) 500 MG tablet Take 1 tablet (500 mg total) by mouth once daily. 90 tablet 3 Past Week    vedolizumab (ENTYVIO) 300 mg SolR injection Inject 300 mg into the vein every 8 weeks.   Past Week    zolpidem (AMBIEN) 10 mg Tab Take 1 tablet (10 mg total) by mouth every evening. 30 tablet 2 Past Week    albuterol (VENTOLIN HFA) 90 mcg/actuation inhaler Inhale 2 puffs into the lungs every 6 (six) hours as needed. Rescue (Patient not taking: Reported on 4/15/2024) 18 g 0 More than a month    ipratropium (ATROVENT) 21 mcg (0.03 %) nasal spray use 2 sprays by Each Nostril route 2 (two) times daily as needed. (Patient not taking: Reported on 4/15/2024) 30 mL 0 More than a month    tamsulosin (FLOMAX) 0.4 mg Cap Take 1 capsule (0.4 mg total) by mouth once daily. (Patient not taking: Reported on 4/15/2024) 30 capsule 1        Physical Exam:    Vital Signs:   Vitals:    06/04/24 1128   BP: 121/80   Pulse: 66   Resp: 15   Temp: 98.4 °F (36.9 °C)       General Appearance: Well appearing in no acute distress  Abdomen: Soft, non tender, non distended with normal bowel sounds, no masses    Labs:  Lab Results   Component Value Date    WBC 4.07 04/18/2024    HGB 11.4 (L) 04/18/2024    HCT 38.9 04/18/2024     04/18/2024    CHOL 192 01/02/2024    TRIG 150 01/02/2024    HDL 40 01/02/2024    ALT 9 (L) 04/16/2024    AST 17 04/16/2024     04/18/2024    K 3.9 04/18/2024     04/18/2024    CREATININE 0.7 04/18/2024    BUN 6 04/18/2024    CO2 24 04/18/2024    TSH 2.386 01/02/2024    INR 1.0 01/10/2024    HGBA1C 5.1 01/01/2024       I have explained the risks and benefits of this endoscopic procedure to the patient including but not limited to bleeding, inflammation, infection, perforation, and death.      Peace Garcia,  MD

## 2024-06-05 DIAGNOSIS — F41.1 GENERALIZED ANXIETY DISORDER: ICD-10-CM

## 2024-06-05 RX ORDER — MIRTAZAPINE 30 MG/1
30 TABLET, ORALLY DISINTEGRATING ORAL NIGHTLY
Qty: 90 TABLET | Refills: 2 | Status: SHIPPED | OUTPATIENT
Start: 2024-06-05

## 2024-06-05 NOTE — TELEPHONE ENCOUNTER
Refill Routing Note   Medication(s) are not appropriate for processing by [facility] for the following reason(s):        ED/Hospital Visit since last OV with provider  New or recently adjusted medication    [facility] action(s):  Defer             Appointments  past 12m or future 3m with PCP    Date Provider   Last Visit   2/2/2024 Luis Madden, DO   Next Visit   Visit date not found Luis Madden, DO   ED visits in past 90 days: 0        Note composed:8:07 AM 06/05/2024

## 2024-06-05 NOTE — TELEPHONE ENCOUNTER
No care due was identified.  [facility] Embedded Care Due Messages. Reference number: [id number].   6/05/2024 2:37:57 AM CDT

## 2024-06-06 LAB
FINAL PATHOLOGIC DIAGNOSIS: NORMAL
GROSS: NORMAL
Lab: NORMAL

## 2024-06-06 NOTE — ANESTHESIA POSTPROCEDURE EVALUATION
Anesthesia Post Evaluation    Patient: Ivy Salgado    Procedure(s) Performed: Procedure(s) (LRB):  ILEOSCOPY (N/A)    Final Anesthesia Type: general      Patient location during evaluation: GI PACU  Patient participation: Yes- Able to Participate  Level of consciousness: awake  Post-procedure vital signs: reviewed and stable  Pain management: adequate  Airway patency: patent    PONV status at discharge: No PONV  Anesthetic complications: no      Cardiovascular status: blood pressure returned to baseline  Respiratory status: unassisted  Hydration status: euvolemic  Follow-up not needed.              Vitals Value Taken Time   /67 06/04/24 1250   Temp 36.7 °C (98.1 °F) 06/04/24 1220   Pulse 67 06/04/24 1250   Resp 15 06/04/24 1250   SpO2 98 % 06/04/24 1250         Event Time   Out of Recovery 13:21:23         Pain/Michael Score: No data recorded

## 2024-06-10 ENCOUNTER — PATIENT MESSAGE (OUTPATIENT)
Dept: INTERNAL MEDICINE | Facility: CLINIC | Age: 44
End: 2024-06-10
Payer: COMMERCIAL

## 2024-06-11 ENCOUNTER — CLINICAL SUPPORT (OUTPATIENT)
Dept: REHABILITATION | Facility: HOSPITAL | Age: 44
End: 2024-06-11
Payer: COMMERCIAL

## 2024-06-11 ENCOUNTER — PATIENT MESSAGE (OUTPATIENT)
Dept: INTERNAL MEDICINE | Facility: CLINIC | Age: 44
End: 2024-06-11
Payer: COMMERCIAL

## 2024-06-11 DIAGNOSIS — M25.612 DECREASED RANGE OF MOTION OF LEFT SHOULDER: Primary | ICD-10-CM

## 2024-06-11 PROCEDURE — 97530 THERAPEUTIC ACTIVITIES: CPT

## 2024-06-11 NOTE — PROGRESS NOTES
[facility]   Physical Therapy Treatment Note     Name: Ivy Salgado  Clinic Number: [id number]    Therapy Diagnosis:   Encounter Diagnosis   Name Primary?    Decreased range of motion of left shoulder Yes         Physician: Mono Glies III, *    Visit Date: 6/11/2024     Evaluation Date: 8/8/2023  Authorization Period Expiration: 7/6/2024  Plan of Care Expiration: 12/31/2023  Progress Note Due: 9/10/2023  Visit # / Visits authorized: 21/40  Foto: 3/3     Time In: 1500  Time Out: 1600  Total Billable Time: 50 minutes     DOS: 7/18/2023  S/p: left  1. Total shoulder arthroplasty (complex, -22 modifier)  2. Shoulder lysis of adhesions  3. Shoulder subpectoral biceps tenodesis (CPT 72195)  Post-Op Precautions: We will follow the shoulder arthroplasty guidelines. The patient remained in a sling for 6 weeks. We will start PT at the 3-week martina.       Precautions: none    SUBJECTIVE     Pt reports: feels ok after her abdominal scope, wants to focus on strength of UE L      She was compliant with home exercise program.  Response to previous treatment: improvement in pain at rest   Functional change: able to ttol reaching overhead a bit better    Pain: 3/10  Location: left shoulder      OBJECTIVE     Shoulder depression L moderate  Mild dec in pec and lab mobility   Mild hypomobile post GHJ     Treatment       Extender used during treatment to A with care.     Ivy received the treatments listed below:      Neuromuscular re-education activities to improve: motor control for 00 min  Hand heel rocks 30x   UT level 1 on wall     Therapeutic activities to improve functional performance for 50  minutes, including:  UBE 3m forward / 3m backward L UE only lvl 4.5  Bent over ros three point stance 3x10 7#   Bench Press 7# 4x5   OH press 1# 4x8   Push-ups leaning on table 4x5  Reverse flys 1# 3x10     Patient Education and Home Exercises     Home Exercises Provided and Patient Education Provided      Education provided:   - Add new exercises     Written Home Exercises Provided: yes. Exercises were reviewed and Ivy was able to demonstrate them prior to the end of the session.  Ivy demonstrated good  understanding of the education provided. See EMR under Patient Instructions for exercises provided during therapy sessions    ASSESSMENT     Pt presents with good ROM of L shoulder, limitations continue though this will likely be present for a while. The patient treatment focused on strengthening exercises today. Good tolerance     Ivy Is progressing well towards her goals.     Pt prognosis is Fair.     Pt will continue to benefit from skilled outpatient physical therapy to address the deficits listed in the problem list box on initial evaluation, provide pt/family education and to maximize pt's level of independence in the home and community environment.     Pt's spiritual, cultural and educational needs considered and pt agreeable to plan of care and goals.     Anticipated barriers to physical therapy: none     Goals:   Short Term Goals: 12 weeks  1. Pt will be compliant with HEP 50% of prescribed amount. met  2. The pt to demo improvement in R shoulder PROM to by 80% of the L met  3.  The pt to demo tolerance to being out of the sling for 24 hours with pain <2/10 met     Long Term Goals:  36 weeks   Pt will be compliant with % of prescribed amount.  met  The pt to improvement in AROM of L shoulder within 80% of R shoulder to improve tolerance to OH movement  met  The pt to  Demo at least 4+/5 of RTC muscle testing to demo improvement in tolerance to activity progressing, not met  The pt to tolerate lifting 50# from the ground to waist height per job requirement description progressing, not met  The pt will report full participation in ADLs and IADLs without restrictions related to L Shoulder.  progressing, not met    PLAN     Follow TSA procedure     Dorothy Basurto, PT,

## 2024-06-13 ENCOUNTER — PATIENT MESSAGE (OUTPATIENT)
Dept: SPORTS MEDICINE | Facility: CLINIC | Age: 44
End: 2024-06-13
Payer: COMMERCIAL

## 2024-06-13 ENCOUNTER — PATIENT MESSAGE (OUTPATIENT)
Dept: REHABILITATION | Facility: HOSPITAL | Age: 44
End: 2024-06-13
Payer: COMMERCIAL

## 2024-06-20 ENCOUNTER — PATIENT MESSAGE (OUTPATIENT)
Dept: REHABILITATION | Facility: HOSPITAL | Age: 44
End: 2024-06-20

## 2024-06-20 ENCOUNTER — PATIENT MESSAGE (OUTPATIENT)
Dept: GASTROENTEROLOGY | Facility: CLINIC | Age: 44
End: 2024-06-20
Payer: COMMERCIAL

## 2024-06-21 RX ORDER — PROMETHAZINE HYDROCHLORIDE 25 MG/1
25 TABLET ORAL EVERY 6 HOURS PRN
Qty: 30 TABLET | Refills: 0 | Status: SHIPPED | OUTPATIENT
Start: 2024-06-21

## 2024-06-26 ENCOUNTER — CLINICAL SUPPORT (OUTPATIENT)
Dept: REHABILITATION | Facility: HOSPITAL | Age: 44
End: 2024-06-26
Payer: COMMERCIAL

## 2024-06-26 ENCOUNTER — PATIENT MESSAGE (OUTPATIENT)
Dept: UROLOGY | Facility: CLINIC | Age: 44
End: 2024-06-26
Payer: COMMERCIAL

## 2024-06-26 DIAGNOSIS — M25.612 DECREASED RANGE OF MOTION OF LEFT SHOULDER: Primary | ICD-10-CM

## 2024-06-26 PROCEDURE — 97530 THERAPEUTIC ACTIVITIES: CPT | Mod: CQ

## 2024-06-26 NOTE — PROGRESS NOTES
[facility]   Physical Therapy Treatment Note     Name: Ivy Salgado  Clinic Number: [id number]    Therapy Diagnosis:   Encounter Diagnosis   Name Primary?    Decreased range of motion of left shoulder Yes         Physician: Mono Giles III, *    Visit Date: 6/26/2024     Evaluation Date: 8/8/2023  Authorization Period Expiration: 7/6/2024  Plan of Care Expiration: 12/31/2023  Progress Note Due: 9/10/2023  Visit # / Visits authorized: 22/40  Foto: 3/3     Time In: 3:50 p  Time Out: 4:40 p  Total Billable Time: 50 minutes (PT tech extender used this session)    DOS: 7/18/2023  S/p: left  1. Total shoulder arthroplasty (complex, -22 modifier)  2. Shoulder lysis of adhesions  3. Shoulder subpectoral biceps tenodesis (CPT 32793)  Post-Op Precautions: We will follow the shoulder arthroplasty guidelines. The patient remained in a sling for 6 weeks. We will start PT at the 3-week martina.       Precautions: none    SUBJECTIVE     Pt reports: have not done anything for shoulder in a while. Went on week vacation. Have been swimming but not going to gym.    She was compliant with home exercise program.  Response to previous treatment: improvement in pain at rest   Functional change: able to ttol reaching overhead a bit better    Pain: 3/10  Location: left shoulder      OBJECTIVE     Shoulder depression L moderate  Mild dec in pec and lab mobility   Mild hypomobile post GHJ     Treatment       Extender used during treatment to A with care.     Ivy received the treatments listed below:      Neuromuscular re-education activities to improve: motor control for 05 min    UT level 1 on wall     Therapeutic activities to improve functional performance for 45  minutes, including:  ROM check  UBE 5m forward / 5m backward L UE only lvl 5  Landmine #5 3x12  Push up EOT 5x6  Sled push /pull #35 1 lap #25 1 laps  Bicep curl #5 3x10    Np:  Bent over ros three point stance 3x10 7#   Bench Press 7# 4x5   OH  press 1# 4x8   Reverse flys 1# 3x10     Patient Education and Home Exercises     Home Exercises Provided and Patient Education Provided     Education provided:   - Add new exercises     Written Home Exercises Provided: yes. Exercises were reviewed and Ivy was able to demonstrate them prior to the end of the session.  Ivy demonstrated good  understanding of the education provided. 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 Emphasized importance of getting to the gym and continuing upper body strengthening. Scheduled out 1 more session and will set a plan at that time.    Ivy Is progressing well towards her goals.     Pt prognosis is Fair.     Pt will continue to benefit from skilled outpatient physical therapy to address the deficits listed in the problem list box on initial evaluation, provide pt/family education and to maximize pt's level of independence in the home and community environment.     Pt's spiritual, cultural and educational needs considered and pt agreeable to plan of care and goals.     Anticipated barriers to physical therapy: none     Goals:   Short Term Goals: 12 weeks  1. Pt will be compliant with HEP 50% of prescribed amount. met  2. The pt to demo improvement in R shoulder PROM to by 80% of the L met  3.  The pt to demo tolerance to being out of the sling for 24 hours with pain <2/10 met     Long Term Goals:  36 weeks   Pt will be compliant with % of prescribed amount.  met  The pt to improvement in AROM of L shoulder within 80% of R shoulder to improve tolerance to OH movement  met  The pt to  Demo at least 4+/5 of RTC muscle testing to demo improvement in tolerance to activity progressing, not met  The pt to tolerate lifting 50# from the ground to waist  height per job requirement description progressing, not met  The pt will report full participation in ADLs and IADLs without restrictions related to L Shoulder.  progressing, not met    PLAN     Follow TSA procedure     Vidya Lemus PTA,

## 2024-07-01 ENCOUNTER — HOSPITAL ENCOUNTER (OUTPATIENT)
Dept: RADIOLOGY | Facility: HOSPITAL | Age: 44
Discharge: HOME | End: 2024-07-01
Attending: FAMILY MEDICINE
Payer: COMMERCIAL

## 2024-07-01 VITALS — BODY MASS INDEX: 21.14 KG/M2 | WEIGHT: 112 LBS | HEIGHT: 61 IN

## 2024-07-01 DIAGNOSIS — Z12.31 ENCOUNTER FOR SCREENING MAMMOGRAM FOR BREAST CANCER: ICD-10-CM

## 2024-07-01 PROCEDURE — 77067 SCR MAMMO BI INCL CAD: CPT | Mod: 26,,, | Performed by: RADIOLOGY

## 2024-07-01 PROCEDURE — 77063 BREAST TOMOSYNTHESIS BI: CPT | Mod: 26,,, | Performed by: RADIOLOGY

## 2024-07-01 PROCEDURE — 77067 SCR MAMMO BI INCL CAD: CPT | Mod: TC

## 2024-07-02 ENCOUNTER — TELEPHONE (OUTPATIENT)
Dept: GASTROENTEROLOGY | Facility: CLINIC | Age: 44
End: 2024-07-02
Payer: COMMERCIAL

## 2024-07-02 ENCOUNTER — TELEPHONE (OUTPATIENT)
Dept: INFECTIOUS DISEASES | Facility: CLINIC | Age: 44
End: 2024-07-02
Payer: COMMERCIAL

## 2024-07-02 ENCOUNTER — PATIENT MESSAGE (OUTPATIENT)
Dept: GASTROENTEROLOGY | Facility: CLINIC | Age: 44
End: 2024-07-02
Payer: COMMERCIAL

## 2024-07-02 ENCOUNTER — CLINICAL SUPPORT (OUTPATIENT)
Dept: REHABILITATION | Facility: HOSPITAL | Age: 44
End: 2024-07-02
Payer: COMMERCIAL

## 2024-07-02 DIAGNOSIS — M25.612 DECREASED RANGE OF MOTION OF LEFT SHOULDER: Primary | ICD-10-CM

## 2024-07-02 PROCEDURE — 97530 THERAPEUTIC ACTIVITIES: CPT

## 2024-07-02 PROCEDURE — 97112 NEUROMUSCULAR REEDUCATION: CPT

## 2024-07-02 RX ORDER — NITROFURANTOIN 25; 75 MG/1; MG/1
100 CAPSULE ORAL 2 TIMES DAILY
Qty: 10 CAPSULE | Refills: 0 | Status: SHIPPED | OUTPATIENT
Start: 2024-07-02 | End: 2024-07-05

## 2024-07-02 NOTE — TELEPHONE ENCOUNTER
Received entyvio 10:20  Started having malaise and urinating filaments around 2 PM  Patient will drop off urine culture  Nitrofurantoin 100 mg PO BID x5 days sent to start after dropping off urine culture

## 2024-07-02 NOTE — PROGRESS NOTES
[facility]   Physical Therapy Treatment Note     Name: Ivy Salgado  Clinic Number: [id number]    Therapy Diagnosis:   Encounter Diagnosis   Name Primary?    Decreased range of motion of left shoulder Yes         Physician: Mono Giles III, *    Visit Date: 7/2/2024     Evaluation Date: 8/8/2023  Authorization Period Expiration: 7/6/2024  Plan of Care Expiration: 12/31/2023  Progress Note Due: 9/10/2023  Visit # / Visits authorized: 23/40  Foto: 3/3     Time In: 0900  Time Out: 1000  Total Billable Time: 38 minutes (PT tech extender used this session)    DOS: 7/18/2023  S/p: left  1. Total shoulder arthroplasty (complex, -22 modifier)  2. Shoulder lysis of adhesions  3. Shoulder subpectoral biceps tenodesis (CPT 63076)  Post-Op Precautions: We will follow the shoulder arthroplasty guidelines. The patient remained in a sling for 6 weeks. We will start PT at the 3-week martina.       Precautions: none    SUBJECTIVE     Pt reports: she is feeling really good.     She was compliant with home exercise program.  Response to previous treatment: improvement in pain at rest   Functional change: able to ttol reaching overhead a bit better    Pain: 3/10  Location: left shoulder      OBJECTIVE     Treatment       Extender used during treatment to A with care.     Ivy received the treatments listed below:      Neuromuscular re-education activities to improve: motor control for 08 min    Plank with alt shld taps 3x20   OH Press 2# 4x8    Therapeutic activities to improve functional performance for 40  minutes, including:  ROM check  FDC warm up 3 rds   Bicep curls 5# 3x10   Campbell Carry 10# <-> 3x   Campbell Carry 7# 1x <->   Floor Press 7# 5x5   Tricep extensions 3x15 gtb  Band chest flys 3x15 gtb   Bent over rows 3x10 5#     Patient Education and Home Exercises     Home Exercises Provided and Patient Education Provided     Education provided:   - Add new exercises     Written Home Exercises  Provided: yes. Exercises were reviewed and Ivy was able to demonstrate them prior to the end of the session.  Ivy demonstrated good  understanding of the education provided. See EMR under Patient Instructions for exercises provided during therapy sessions    ASSESSMENT     The patient with excellent tolerance to strength based straining today. No pain in shld during movements but fatigue noted. Discussed return to activity program and her focus to increase strength in UE. Overall the patient is making excellent progress.     Ivy Is progressing well towards her goals.     Pt prognosis is Fair.     Pt will continue to benefit from skilled outpatient physical therapy to address the deficits listed in the problem list box on initial evaluation, provide pt/family education and to maximize pt's level of independence in the home and community environment.     Pt's spiritual, cultural and educational needs considered and pt agreeable to plan of care and goals.     Anticipated barriers to physical therapy: none     Goals:   Short Term Goals: 12 weeks  1. Pt will be compliant with HEP 50% of prescribed amount. met  2. The pt to demo improvement in R shoulder PROM to by 80% of the L met  3.  The pt to demo tolerance to being out of the sling for 24 hours with pain <2/10 met     Long Term Goals:  36 weeks   Pt will be compliant with % of prescribed amount.  met  The pt to improvement in AROM of L shoulder within 80% of R shoulder to improve tolerance to OH movement  met  The pt to  Demo at least 4+/5 of C muscle testing to demo improvement in tolerance to activity progressing, not met  The pt to tolerate lifting 50# from the ground to waist height per job requirement description progressing, not met  The pt will report full participation in ADLs and IADLs without restrictions related to L Shoulder.  progressing, not met    PLAN     Follow TSA procedure     Dorothy Basurto, PT,

## 2024-07-02 NOTE — TELEPHONE ENCOUNTER
----- Message from Natividad Ugarte sent at 7/2/2024  3:36 PM CDT -----  Regarding: apt  Contact: [phone]  Pt calling in requesting call back regarding apt please call to discuss  Further

## 2024-07-02 NOTE — TELEPHONE ENCOUNTER
She had an MRI in Jan. She says she spoke with Dr Cameron and he told her she should have a follow up in 6 months.   Calling to schedule.  Can you review and let me know if she should have an MRI?

## 2024-07-03 ENCOUNTER — PATIENT MESSAGE (OUTPATIENT)
Dept: REHABILITATION | Facility: HOSPITAL | Age: 44
End: 2024-07-03
Payer: COMMERCIAL

## 2024-07-03 ENCOUNTER — LAB VISIT (OUTPATIENT)
Dept: LAB | Facility: HOSPITAL | Age: 44
End: 2024-07-03
Attending: INTERNAL MEDICINE
Payer: COMMERCIAL

## 2024-07-03 ENCOUNTER — TELEPHONE (OUTPATIENT)
Dept: SURGERY | Facility: CLINIC | Age: 44
End: 2024-07-03
Payer: COMMERCIAL

## 2024-07-03 ENCOUNTER — TELEPHONE (OUTPATIENT)
Dept: INTERVENTIONAL RADIOLOGY/VASCULAR | Facility: CLINIC | Age: 44
End: 2024-07-03
Payer: COMMERCIAL

## 2024-07-03 DIAGNOSIS — N39.0 RECURRENT UTI: ICD-10-CM

## 2024-07-03 DIAGNOSIS — Z95.828 PORT-A-CATH IN PLACE: Primary | ICD-10-CM

## 2024-07-03 DIAGNOSIS — Z71.89 COMPLEX CARE COORDINATION: ICD-10-CM

## 2024-07-03 LAB
BACTERIA #/AREA URNS AUTO: ABNORMAL /HPF
BILIRUB UR QL STRIP: NEGATIVE
CLARITY UR REFRACT.AUTO: CLEAR
COLOR UR AUTO: YELLOW
GLUCOSE UR QL STRIP: NEGATIVE
HGB UR QL STRIP: ABNORMAL
HYALINE CASTS UR QL AUTO: 0 /LPF
KETONES UR QL STRIP: NEGATIVE
LEUKOCYTE ESTERASE UR QL STRIP: ABNORMAL
MICROSCOPIC COMMENT: ABNORMAL
NITRITE UR QL STRIP: NEGATIVE
PH UR STRIP: 6 [PH] (ref 5–8)
PROT UR QL STRIP: ABNORMAL
RBC #/AREA URNS AUTO: 15 /HPF (ref 0–4)
SP GR UR STRIP: 1.02 (ref 1–1.03)
SQUAMOUS #/AREA URNS AUTO: 4 /HPF
URN SPEC COLLECT METH UR: ABNORMAL
WBC #/AREA URNS AUTO: 29 /HPF (ref 0–5)

## 2024-07-03 PROCEDURE — 87088 URINE BACTERIA CULTURE: CPT | Performed by: INTERNAL MEDICINE

## 2024-07-03 PROCEDURE — 87186 SC STD MICRODIL/AGAR DIL: CPT | Performed by: INTERNAL MEDICINE

## 2024-07-03 PROCEDURE — 81001 URINALYSIS AUTO W/SCOPE: CPT | Performed by: INTERNAL MEDICINE

## 2024-07-03 PROCEDURE — 87086 URINE CULTURE/COLONY COUNT: CPT | Performed by: INTERNAL MEDICINE

## 2024-07-03 PROCEDURE — 87077 CULTURE AEROBIC IDENTIFY: CPT | Performed by: INTERNAL MEDICINE

## 2024-07-05 ENCOUNTER — TELEPHONE (OUTPATIENT)
Dept: INFECTIOUS DISEASES | Facility: CLINIC | Age: 44
End: 2024-07-05
Payer: COMMERCIAL

## 2024-07-05 DIAGNOSIS — Z95.828 PORT-A-CATH IN PLACE: Primary | ICD-10-CM

## 2024-07-05 LAB — BACTERIA UR CULT: ABNORMAL

## 2024-07-05 RX ORDER — CEPHALEXIN 500 MG/1
500 CAPSULE ORAL EVERY 12 HOURS
Qty: 14 CAPSULE | Refills: 0 | Status: SHIPPED | OUTPATIENT
Start: 2024-07-05 | End: 2024-07-12

## 2024-07-05 NOTE — TELEPHONE ENCOUNTER
Urine culture with Klebsiella intermediate to nitrofurantoin  Will send prescription for cephalexin

## 2024-07-08 ENCOUNTER — PATIENT MESSAGE (OUTPATIENT)
Dept: WOUND CARE | Facility: CLINIC | Age: 44
End: 2024-07-08
Payer: COMMERCIAL

## 2024-07-09 DIAGNOSIS — Z95.828 PORT-A-CATH IN PLACE: Primary | ICD-10-CM

## 2024-07-09 DIAGNOSIS — B37.2 YEAST INFECTION OF THE SKIN: Primary | ICD-10-CM

## 2024-07-09 RX ORDER — FLUCONAZOLE 150 MG/1
150 TABLET ORAL DAILY
Qty: 2 TABLET | Refills: 0 | Status: SHIPPED | OUTPATIENT
Start: 2024-07-09 | End: 2024-07-11

## 2024-07-10 ENCOUNTER — TELEPHONE (OUTPATIENT)
Dept: GASTROENTEROLOGY | Facility: CLINIC | Age: 44
End: 2024-07-10
Payer: COMMERCIAL

## 2024-07-10 NOTE — TELEPHONE ENCOUNTER
----- Message from Maya Waggoner sent at 7/10/2024  1:27 PM CDT -----  Regarding: Appt  Contact: [phone]  Ivy Salgado calling regarding Appointment Access  (message) for # pt is calling to speak with nurse to schedule an appt with provider she states she has a cyst on her pancreas pls call pt to schedule

## 2024-07-16 ENCOUNTER — CLINICAL SUPPORT (OUTPATIENT)
Dept: REHABILITATION | Facility: HOSPITAL | Age: 44
End: 2024-07-16
Payer: COMMERCIAL

## 2024-07-16 ENCOUNTER — PATIENT MESSAGE (OUTPATIENT)
Dept: INTERVENTIONAL RADIOLOGY/VASCULAR | Facility: HOSPITAL | Age: 44
End: 2024-07-16
Payer: COMMERCIAL

## 2024-07-16 ENCOUNTER — TELEPHONE (OUTPATIENT)
Dept: GASTROENTEROLOGY | Facility: CLINIC | Age: 44
End: 2024-07-16
Payer: COMMERCIAL

## 2024-07-16 ENCOUNTER — LAB VISIT (OUTPATIENT)
Dept: LAB | Facility: HOSPITAL | Age: 44
End: 2024-07-16
Attending: PHYSICIAN ASSISTANT
Payer: COMMERCIAL

## 2024-07-16 DIAGNOSIS — M25.612 DECREASED RANGE OF MOTION OF LEFT SHOULDER: Primary | ICD-10-CM

## 2024-07-16 DIAGNOSIS — Z95.828 PORT-A-CATH IN PLACE: ICD-10-CM

## 2024-07-16 DIAGNOSIS — F41.1 GENERALIZED ANXIETY DISORDER: ICD-10-CM

## 2024-07-16 LAB
INR PPP: 1 (ref 0.8–1.2)
PROTHROMBIN TIME: 10.9 SEC (ref 9–12.5)

## 2024-07-16 PROCEDURE — 36415 COLL VENOUS BLD VENIPUNCTURE: CPT | Performed by: PHYSICIAN ASSISTANT

## 2024-07-16 PROCEDURE — 97112 NEUROMUSCULAR REEDUCATION: CPT

## 2024-07-16 PROCEDURE — 97530 THERAPEUTIC ACTIVITIES: CPT

## 2024-07-16 PROCEDURE — 85610 PROTHROMBIN TIME: CPT | Performed by: PHYSICIAN ASSISTANT

## 2024-07-16 RX ORDER — CLONAZEPAM 1 MG/1
1 TABLET ORAL 2 TIMES DAILY
Qty: 60 TABLET | Refills: 2 | Status: SHIPPED | OUTPATIENT
Start: 2024-07-16

## 2024-07-16 NOTE — NURSING
"Pre-Op call completed pt. voiced concerned with moderate sedation for procedure scheduled for 7/17/24 at [facility]. She requested procedure to be done at , stated," has tolerance to medications used for moderate sedation in the past". IR  aware and will reach out to IR doctors. Patient voiced understanding has clinic and after hours number.   "

## 2024-07-16 NOTE — NURSING
Pre-procedure call complete.  2 patient identifier used (name and ).   Arrival time 0600.  Patient instructed not to eat or drink anything after midnight the night before procedure.  Patient aware will need someone to provide transport home and monitor pt 8 hours post procedure.  No driving for at least 24 hours after procedure.   Patient advised to take blood pressure, heart medications, with a sip of water morning of procedure.  Patient verbalized aware of which medications to take.  Do not take sleep medication (including OTC) and anxiety medication the night before procedure.  Arrival time and location given.  Expected length of stay reviewed.  Covid screening completed.  Patient verbalized understanding of all pre-procedure instructions.  Written instructions and directions sent to patient in [employer]portal.

## 2024-07-16 NOTE — TELEPHONE ENCOUNTER
No care due was identified.  [facility] Embedded Care Due Messages. Reference number: [id number].   7/16/2024 8:26:46 AM CDT

## 2024-07-16 NOTE — TELEPHONE ENCOUNTER
LOV-2/2/24   Localized Dermabrasion Text: The patient was draped in routine manner.  Localized dermabrasion using 3 x 17 mm wire brush was performed in routine manner to papillary dermis. This spot dermabrasion is being performed to complete skin cancer reconstruction. It also will eliminate the other sun damaged precancerous cells that are known to be part of the regional effect of a lifetime's worth of sun exposure. This localized dermabrasion is therapeutic and should not be considered cosmetic in any regard. Localized Dermabrasion With Wire Brush Text: The patient was draped in routine manner.  Localized dermabrasion using 3 x 17 mm wire brush was performed in routine manner to papillary dermis. This spot dermabrasion is being performed to complete skin cancer reconstruction. It also will eliminate the other sun damaged precancerous cells that are known to be part of the regional effect of a lifetime's worth of sun exposure. This localized dermabrasion is therapeutic and should not be considered cosmetic in any regard.

## 2024-07-17 ENCOUNTER — TELEPHONE (OUTPATIENT)
Dept: ENDOSCOPY | Facility: HOSPITAL | Age: 44
End: 2024-07-17
Payer: COMMERCIAL

## 2024-07-17 ENCOUNTER — HOSPITAL ENCOUNTER (OUTPATIENT)
Dept: INTERVENTIONAL RADIOLOGY/VASCULAR | Facility: HOSPITAL | Age: 44
Discharge: HOME | End: 2024-07-17
Attending: FAMILY MEDICINE
Payer: COMMERCIAL

## 2024-07-17 ENCOUNTER — TELEPHONE (OUTPATIENT)
Dept: INTERVENTIONAL RADIOLOGY/VASCULAR | Facility: CLINIC | Age: 44
End: 2024-07-17
Payer: COMMERCIAL

## 2024-07-17 VITALS
TEMPERATURE: 98 F | DIASTOLIC BLOOD PRESSURE: 62 MMHG | HEIGHT: 61 IN | BODY MASS INDEX: 21.14 KG/M2 | OXYGEN SATURATION: 97 % | SYSTOLIC BLOOD PRESSURE: 100 MMHG | HEART RATE: 60 BPM | WEIGHT: 112 LBS | RESPIRATION RATE: 16 BRPM

## 2024-07-17 DIAGNOSIS — Z95.828 PORT-A-CATH IN PLACE: Primary | ICD-10-CM

## 2024-07-17 DIAGNOSIS — Z95.828 PORT-A-CATH IN PLACE: ICD-10-CM

## 2024-07-17 PROCEDURE — 36598 INJ W/FLUOR EVAL CV DEVICE: CPT | Performed by: STUDENT IN AN ORGANIZED HEALTH CARE EDUCATION/TRAINING PROGRAM

## 2024-07-17 PROCEDURE — 99900035 HC TECH TIME PER 15 MIN (STAT)

## 2024-07-17 PROCEDURE — 25000003 PHARM REV CODE 250: Performed by: STUDENT IN AN ORGANIZED HEALTH CARE EDUCATION/TRAINING PROGRAM

## 2024-07-17 PROCEDURE — 36598 INJ W/FLUOR EVAL CV DEVICE: CPT

## 2024-07-17 PROCEDURE — 94761 N-INVAS EAR/PLS OXIMETRY MLT: CPT

## 2024-07-17 RX ORDER — LIDOCAINE AND PRILOCAINE 25; 25 MG/G; MG/G
CREAM TOPICAL ONCE
Status: COMPLETED | OUTPATIENT
Start: 2024-07-17 | End: 2024-07-17

## 2024-07-17 RX ORDER — LIDOCAINE HYDROCHLORIDE 20 MG/ML
INJECTION, SOLUTION INFILTRATION; PERINEURAL
Status: COMPLETED | OUTPATIENT
Start: 2024-07-17 | End: 2024-07-17

## 2024-07-17 RX ADMIN — LIDOCAINE HYDROCHLORIDE 20 ML: 20 INJECTION, SOLUTION INFILTRATION; PERINEURAL at 07:07

## 2024-07-17 RX ADMIN — LIDOCAINE AND PRILOCAINE: 25; 25 CREAM TOPICAL at 07:07

## 2024-07-17 NOTE — PROCEDURES
Interventional Radiology Post-Procedure Note    Pre Op Diagnosis: port dysfunction  Post Op Diagnosis: Same    Procedure: port check    Procedure performed by: Sangeetha Campo MD    Written Informed Consent Obtained: Yes  Specimen Removed: NO  Estimated Blood Loss: Minimal    Findings:   Port check demonstrates fibrin sheath. Port can be flushed but not aspirated.     Patient tolerated procedure well.    Patient will be scheduled for port replacement with anesthesia per patient preference.       Sangeetha Campo MD  Interventional Radiology

## 2024-07-17 NOTE — DISCHARGE SUMMARY
Radiology Discharge Summary      Hospital Course: No complications    Admit Date: 7/17/2024  Discharge Date: 07/17/2024     Instructions Given to Patient: Yes  Diet: Resume prior diet  Activity: activity as tolerated    Description of Condition on Discharge: Stable  Vital Signs (Most Recent): Temp: 98.4 °F (36.9 °C) (07/17/24 0625)  Pulse: 65 (07/17/24 0807)  Resp: 18 (07/17/24 0807)  BP: 111/70 (07/17/24 0807)  SpO2: 98 % (07/17/24 0807)    Discharge Disposition: Home    Discharge Diagnosis: port dysfunction     Follow-up: Patient will be scheduled for port replacement with anesthesia per patient preference.     Sangeetha Campo MD

## 2024-07-17 NOTE — TELEPHONE ENCOUNTER
Spoke to pt on phone,  Pt is scheduled on 7/23/2024 with arrival time of 6am for IR procedure with anes at  location.  Preop instructions given (NPO after midnight, MUST have a ride home, Nurse will call 1-2  days before to go over instructions and medications), . pt verbally understood. Pt aware and confirmed, Thanks

## 2024-07-17 NOTE — PLAN OF CARE
Pt arrived to [facility] for port placement check. Pt AAO. Pt oriented to unit and staff. Plan of care reviewed with pt and verbalizes understanding. Pt prepped and draped using sterile technique. Pt placed on continuous monitoring. Pt presents comfortable and w/o complaints.Timeout completed with staff present to perform procedure. See charting for all vitals, assessments and medications given.

## 2024-07-17 NOTE — DISCHARGE INSTRUCTIONS
Try to keep dressing dry and intact.         No submerging under water in pool or bathtub until incision is completely healed.         No heavy lifting, greater than 25 pounds, especially overhead for approximately 1 week.

## 2024-07-17 NOTE — Clinical Note
Left: Chest.   Scrubbed with Chlorhexidine/Alcohol.    Hair: N/A.  Skin prep dry before draping.  Prepped by: Mary Stanton RT 7/17/2024 7:50 AM.

## 2024-07-17 NOTE — H&P
H&P Note  Interventional Radiology    Reason for Consult: port check    SUBJECTIVE:     Chief Complaint: port malfunction    History of Present Illness: 44F PMHx Crohn's disease s/p completion proctocolectomy and end ileostomy 2012. Noted difficult aspirating port at last infusion.     Past Medical History:   Diagnosis Date    Abnormal Pap smear 2007    Alkaline phosphatase elevation- based on lab from 4/9/2019 05/28/2019    Arthritis     Avascular necrosis of bone of hip, left     Bacterial vaginosis     Depression 08/05/2017    Drug-induced pancreatitis (imuran)     Generalized anxiety disorder     Genital HSV     GERD (gastroesophageal reflux disease)     Hypertension     Ileostomy in place 07/09/2012    Kidney stone     Long QT syndrome     Melanoma in situ (excised-buttocks 2018, para-stomal site 2019)     Moderate episode of recurrent major depressive disorder 02/02/2024    Multiple thyroid nodules 11/27/2018    Osteopenia of multiple sites 01/07/2019    Pancreas cyst (tail, 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)     Recurrent Clostridioides difficile diarrhea     Recurrent UTI 04/03/2013     Past Surgical History:   Procedure Laterality Date    ABDOMINAL SURGERY      APPENDECTOMY      ARTHROPLASTY OF SHOULDER Left 7/18/2023    Procedure: ARTHROPLASTY, SHOULDER;  Surgeon: Sharon Babb MD;  Location: [facility];  Service: Orthopedics;  Laterality: Left;    AUGMENTATION OF BREAST Bilateral 06/2022    gel implants    BILATERAL SALPINGO-OOPHORECTOMY (BSO) Bilateral 05/30/2019    Procedure: SALPINGO-OOPHORECTOMY, BILATERAL;  Surgeon: Rupa German MD;  [facility];  Service: OB/GYN;  Laterality: Bilateral;    BLADDER SURGERY      partial cystectomy due to fistula    breast lift      BREAST SURGERY      [facility]      COLON SURGERY      COLONOSCOPY      CYSTOSCOPY  09/23/2020    Procedure: CYSTOSCOPY;  Surgeon: Sascha Florentino MD;  [facility];  Service: Urology;;    CYSTOSCOPY W/ URETERAL STENT PLACEMENT Left  09/15/2020    Procedure: CYSTOSCOPY, WITH URETERAL STENT INSERTION;  Surgeon: Sascha Florentino MD;  Location: [facility] OR 1ST FLR;  Service: Urology;  Laterality: Left;    DIAGNOSTIC LAPAROSCOPY N/A 07/09/2020    Procedure: LAPAROSCOPY, DIAGNOSTIC;  Surgeon: Gilson El MD;  Location: [facility] OR;  Service: OB/GYN;  Laterality: N/A;    ESOPHAGOGASTRODUODENOSCOPY N/A 1/3/2024    Procedure: EGD (ESOPHAGOGASTRODUODENOSCOPY);  Surgeon: Lily Lind MD;  Location: [facility] ENDO (2ND FLR);  Service: Endoscopy;  Laterality: N/A;    EXCISION OF MELANOMA  07/17/2019    ILEOSCOPY N/A 1/3/2024    Procedure: ILEOSCOPY;  Surgeon: Lily Lind MD;  Location: [facility] ENDO (2ND FLR);  Service: Endoscopy;  Laterality: N/A;    ILEOSCOPY N/A 1/24/2024    Procedure: ILEOSCOPY;  Surgeon: Lilian Navarro MD;  Location: [facility] ENDO (2ND FLR);  Service: Endoscopy;  Laterality: N/A;  through stoma    ILEOSCOPY N/A 6/4/2024    Procedure: ILEOSCOPY;  Surgeon: Peace Garcia MD;  Location: [facility] ENDO (4TH FLR);  Service: Endoscopy;  Laterality: N/A;  Ref By:,kaleb sent via portal,  received urgent message to reschedule pt from 7/15  to may or june-updated prep instructions sent-   5/29/24- precall complete - ERW    ILEOSTOMY      LAPAROSCOPIC LYSIS OF ADHESIONS N/A 07/09/2020    Procedure: LYSIS, ADHESIONS, LAPAROSCOPIC;  Surgeon: Gilson El MD;  Location: [facility] OR;  Service: OB/GYN;  Laterality: N/A;    LASER LITHOTRIPSY  09/23/2020    Procedure: LITHOTRIPSY, USING LASER;  Surgeon: Sascha Florentino MD;  Location: [facility] OR 1ST FLR;  Service: Urology;;    LYSIS OF ADHESIONS N/A 05/30/2019    Procedure: LYSIS, ADHESIONS;  Surgeon: Rupa German MD;  Location: [facility] OR 2ND FLR;  Service: OB/GYN;  Laterality: N/A;    OOPHORECTOMY Right 04/16/2015    PORTACATH PLACEMENT  02/21/2017    SKIN BIOPSY      SMALL INTESTINE SURGERY      age 18 Y    TOTAL ABDOMINAL HYSTERECTOMY  04/16/2015    TOTAL COLECTOMY      TUBAL  LIGATION  06/06/2012    UPPER GASTROINTESTINAL ENDOSCOPY      URETEROSCOPIC REMOVAL OF URETERIC CALCULUS  09/23/2020    Procedure: REMOVAL, CALCULUS, URETER, URETEROSCOPIC;  Surgeon: Sascha Florentino MD;  Location: [facility] OR [address] [place] [address];  Service: Urology;;    URETEROSCOPY Left 09/23/2020    Procedure: URETEROSCOPY;  Surgeon: Sascha Florentino MD;  [facility];  Service: Urology;  Laterality: Left;     Family History   Problem Relation Name Age of Onset    Diabetes Paternal Grandfather Stephen     Hearing loss Paternal Grandmother Viviane     Cancer Maternal Grandfather Philippe         Skin    Skin cancer Maternal Grandfather Philippe     Diabetes Maternal Grandfather Philippe     Heart disease Maternal Grandfather Philippe     Colon cancer Father Milan     Cancer Father Milan         Colon    Liver cancer Father Milan     Hyperlipidemia Father Milan     Hypertension Mother Shaila     Crohn's disease Brother      Endometrial cancer Maternal Aunt      Breast cancer Maternal Cousin  41    Crohn's disease Daughter      Ovarian cancer Neg Hx      Melanoma Neg Hx       Social History     Tobacco Use    Smoking status: Never    Smokeless tobacco: Never   Substance Use Topics    Alcohol use: Not Currently     Alcohol/week: 0.0 standard drinks of alcohol    Drug use: No       Review of Systems:  Constitutional/General:No fever, chills, change in appetite or weight loss.  Hematological/Immuno: no known coagulopathies  Respiratory: no shortness of breath  Cardiovascular: no chest pain  Gastrointestinal: no abdominal pain  Genito-Urinary: no dysuria  Musculoskeletal: negative  Skin: Negative for rash, itching, pigmentation changes, nail or hair changes.  Neurological: no TIA or stroke symptoms  Psychiatric: normal mood/affect, good insight/judgement      OBJECTIVE:     Vital Signs Range (Last 24H):  Temp:  [98.4 °F (36.9 °C)]   Pulse:  [55-74]   Resp:  [16-19]   BP: (155)/(90)   SpO2:  [99 %-100 %]     Physical  "Exam:  General- Patient AAO x3 in NAD  ENT- EOMI  Neck- No masses  CV- Normal rate; left chest wall port  Resp-  No increased WOB  GI- Non-distended  Extrem- No cyanosis, clubbing, edema  Derm- No rashes, masses, or lesions noted  Neuro-  No focal deficits noted    Physical Exam  Body mass index is 21.16 kg/m².    Scheduled Meds:    estradiol valerate  20 mg Intramuscular Q30 Days    LIDOcaine-prilocaine   Topical (Top) Once     Continuous Infusions:   PRN Meds:    Allergies:   Review of patient's allergies indicates:   Allergen Reactions    Azathioprine sodium Other (See Comments)     Other reaction(s): pancreatitis  Other reaction(s): pancreatitis    Methotrexate analogues Other (See Comments)     leukopenia    Stelara [ustekinumab] Other (See Comments)     Multiple infections    Zofran [ondansetron hcl (pf)] Other (See Comments)     Per patient causes prolong QT    Vancomycin analogues Other (See Comments)     Made her red    Azathioprine      Other reaction(s): Unknown    Methotrexate      Other reaction(s): infection-    Morphine Itching and Other (See Comments)     Other reaction(s): Itching    Zofran [ondansetron hcl]      Other reaction(s): Hives    Bactrim [sulfamethoxazole-trimethoprim] Palpitations    Ciprofloxacin Palpitations       Labs:  Recent Labs   Lab 07/16/24  0707   INR 1.0     No results for input(s): "WBC", "HGB", "HCT", "MCV", "PLT" in the last 168 hours. No results for input(s): "GLU", "NA", "K", "CL", "CO2", "BUN", "CREATININE", "CALCIUM", "MG", "ALT", "AST", "ALBUMIN", "BILITOT", "BILIDIR" in the last 168 hours.    Vitals (Most Recent):  Temp: 98.4 °F (36.9 °C) (07/17/24 0625)  Pulse: (!) 55 (07/17/24 0625)  Resp: 16 (07/17/24 0625)  BP: (!) 155/90 (07/17/24 0625)  SpO2: 100 % (07/17/24 0625)    ASA: 3  Mallampati: 2    Consent obtained.     ASSESSMENT/PLAN:     44F PMHx Crohn's disease presenting with difficulty aspirating port. Patient initially scheduled for port check and possible " replacement; however, patient would prefer anesthesia should her port need to be replaced. She is amenable to port check with local anesthesia. If port needs to be replaced, will schedule replacement with Anesthesia.     There are no hospital problems to display for this patient.          Sangeetha Campo MD

## 2024-07-17 NOTE — TELEPHONE ENCOUNTER
Returned pts phone call with request for AES service with no answer. Voicemail left with direct phone number for return call.

## 2024-07-18 NOTE — PROGRESS NOTES
[facility] OUTPATIENT THERAPY AND [facility]LL[facility]   Physical Therapy Treatment Note     Name: Ivy Salgaod  Clinic Number: [id number]    Therapy Diagnosis:   Encounter Diagnosis   Name Primary?    Decreased range of motion of left shoulder Yes         Physician: Mono Giles III, *    Visit Date: 7/16/2024     Evaluation Date: 8/8/2023  Authorization Period Expiration: 7/6/2024  Plan of Care Expiration: 12/31/2023  Progress Note Due: 9/10/2023  Visit # / Visits authorized: 24/40  Foto: 3/3     Time In: 1500  Time Out: 1600  Total Billable Time: 38 minutes (PT tech extender used this session)    DOS: 7/18/2023  S/p: left  1. Total shoulder arthroplasty (complex, -22 modifier)  2. Shoulder lysis of adhesions  3. Shoulder subpectoral biceps tenodesis (CPT 20511)  Post-Op Precautions: We will follow the shoulder arthroplasty guidelines. The patient remained in a sling for 6 weeks. We will start PT at the 3-week martina.       Precautions: none    SUBJECTIVE     Pt reports: she is feeling really good.     She was compliant with home exercise program.  Response to previous treatment: improvement in pain at rest   Functional change: able to ttol reaching overhead a bit better    Pain: 3/10  Location: left shoulder      OBJECTIVE     Treatment       Extender used during treatment to A with care.     Ivy received the treatments listed below:      Neuromuscular re-education activities to improve: motor control for 08 min    Plank with alt shld taps 3x20   OH Press 2# 4x8  Quadruped kneeling knees up hold 4x20s     Therapeutic activities to improve functional performance for 50  minutes, including:  ROM check  residential warm up 3 rds   Bicep curls 5# 3x10   Campbell Carry 10# <-> 3x   Floor Press 9# 5x5   Tricep extensions 3x15 gtb  ABD 2# 4x8   Flexion 2# 4x8   MB wall ups wall 8-10# 15x with 5s hold at top    Patient Education and Home Exercises     Home Exercises Provided and Patient Education Provided     Education provided:   -  Add new exercises     Written Home Exercises Provided: yes. Exercises were reviewed and Ivy was able to demonstrate them prior to the end of the session.  Ivy demonstrated good  understanding of the education provided. See EMR under Patient Instructions for exercises provided during therapy sessions    ASSESSMENT     Cont to focus on UE strengthening which the patient able to complete with no pain, compensation noted but to be expected with her post op recovery.     Ivy Is progressing well towards her goals.     Pt prognosis is Fair.     Pt will continue to benefit from skilled outpatient physical therapy to address the deficits listed in the problem list box on initial evaluation, provide pt/family education and to maximize pt's level of independence in the home and community environment.     Pt's spiritual, cultural and educational needs considered and pt agreeable to plan of care and goals.     Anticipated barriers to physical therapy: none     Goals:   Short Term Goals: 12 weeks  1. Pt will be compliant with HEP 50% of prescribed amount. met  2. The pt to demo improvement in R shoulder PROM to by 80% of the L met  3.  The pt to demo tolerance to being out of the sling for 24 hours with pain <2/10 met     Long Term Goals:  36 weeks   Pt will be compliant with % of prescribed amount.  met  The pt to improvement in AROM of L shoulder within 80% of R shoulder to improve tolerance to OH movement  met  The pt to  Demo at least 4+/5 of RTC muscle testing to demo improvement in tolerance to activity progressing, not met  The pt to tolerate lifting 50# from the ground to waist height per job requirement description progressing, not met  The pt will report full participation in ADLs and IADLs without restrictions related to L Shoulder.  progressing, not met    PLAN     Follow TSA procedure     Dorothy Bsaurto, PT,

## 2024-07-22 ENCOUNTER — DOCUMENTATION ONLY (OUTPATIENT)
Dept: PREADMISSION TESTING | Facility: HOSPITAL | Age: 44
End: 2024-07-22
Payer: COMMERCIAL

## 2024-07-23 ENCOUNTER — HOSPITAL ENCOUNTER (OUTPATIENT)
Dept: INTERVENTIONAL RADIOLOGY/VASCULAR | Facility: HOSPITAL | Age: 44
Discharge: HOME | End: 2024-07-23
Payer: COMMERCIAL

## 2024-07-23 ENCOUNTER — ANESTHESIA EVENT (OUTPATIENT)
Dept: INTERVENTIONAL RADIOLOGY/VASCULAR | Facility: HOSPITAL | Age: 44
End: 2024-07-23
Payer: COMMERCIAL

## 2024-07-23 VITALS
WEIGHT: 115 LBS | RESPIRATION RATE: 20 BRPM | OXYGEN SATURATION: 97 % | SYSTOLIC BLOOD PRESSURE: 115 MMHG | BODY MASS INDEX: 21.71 KG/M2 | TEMPERATURE: 98 F | HEART RATE: 68 BPM | DIASTOLIC BLOOD PRESSURE: 58 MMHG | HEIGHT: 61 IN

## 2024-07-23 DIAGNOSIS — Z95.828 PORT-A-CATH IN PLACE: ICD-10-CM

## 2024-07-23 PROCEDURE — 77001 FLUOROGUIDE FOR VEIN DEVICE: CPT | Mod: 26,,, | Performed by: RADIOLOGY

## 2024-07-23 PROCEDURE — 63600175 PHARM REV CODE 636 W HCPCS: Performed by: RADIOLOGY

## 2024-07-23 PROCEDURE — C1788 PORT, INDWELLING, IMP: HCPCS

## 2024-07-23 PROCEDURE — 25000003 PHARM REV CODE 250: Performed by: NURSE ANESTHETIST, CERTIFIED REGISTERED

## 2024-07-23 PROCEDURE — 36582 REPLACE TUNNELED CV CATH: CPT | Mod: LT | Performed by: RADIOLOGY

## 2024-07-23 PROCEDURE — 76937 US GUIDE VASCULAR ACCESS: CPT | Mod: TC | Performed by: RADIOLOGY

## 2024-07-23 PROCEDURE — 63600175 PHARM REV CODE 636 W HCPCS: Performed by: SURGERY

## 2024-07-23 PROCEDURE — 76937 US GUIDE VASCULAR ACCESS: CPT | Mod: 26,,, | Performed by: RADIOLOGY

## 2024-07-23 PROCEDURE — 25000003 PHARM REV CODE 250: Performed by: SURGERY

## 2024-07-23 PROCEDURE — 63600175 PHARM REV CODE 636 W HCPCS: Performed by: NURSE ANESTHETIST, CERTIFIED REGISTERED

## 2024-07-23 PROCEDURE — 25000003 PHARM REV CODE 250: Performed by: RADIOLOGY

## 2024-07-23 PROCEDURE — 63600175 PHARM REV CODE 636 W HCPCS

## 2024-07-23 PROCEDURE — 37000009 HC ANESTHESIA EA ADD 15 MINS

## 2024-07-23 PROCEDURE — 37000008 HC ANESTHESIA 1ST 15 MINUTES

## 2024-07-23 RX ORDER — HYDROMORPHONE HYDROCHLORIDE 1 MG/ML
INJECTION, SOLUTION INTRAMUSCULAR; INTRAVENOUS; SUBCUTANEOUS
Status: COMPLETED
Start: 2024-07-23 | End: 2024-07-23

## 2024-07-23 RX ORDER — LIDOCAINE HYDROCHLORIDE 10 MG/ML
1 INJECTION, SOLUTION EPIDURAL; INFILTRATION; INTRACAUDAL; PERINEURAL ONCE
Status: DISCONTINUED | OUTPATIENT
Start: 2024-07-23 | End: 2024-07-24 | Stop reason: HOSPADM

## 2024-07-23 RX ORDER — FENTANYL CITRATE 50 UG/ML
25 INJECTION, SOLUTION INTRAMUSCULAR; INTRAVENOUS EVERY 5 MIN PRN
Status: COMPLETED | OUTPATIENT
Start: 2024-07-23 | End: 2024-07-23

## 2024-07-23 RX ORDER — LIDOCAINE HYDROCHLORIDE 20 MG/ML
INJECTION INTRAVENOUS
Status: DISCONTINUED | OUTPATIENT
Start: 2024-07-23 | End: 2024-07-23

## 2024-07-23 RX ORDER — HYDROMORPHONE HYDROCHLORIDE 1 MG/ML
0.2 INJECTION, SOLUTION INTRAMUSCULAR; INTRAVENOUS; SUBCUTANEOUS EVERY 5 MIN PRN
Status: COMPLETED | OUTPATIENT
Start: 2024-07-23 | End: 2024-07-23

## 2024-07-23 RX ORDER — HALOPERIDOL 5 MG/ML
0.5 INJECTION INTRAMUSCULAR EVERY 10 MIN PRN
Status: DISCONTINUED | OUTPATIENT
Start: 2024-07-23 | End: 2024-07-24 | Stop reason: HOSPADM

## 2024-07-23 RX ORDER — GLUCAGON 1 MG
1 KIT INJECTION
Status: DISCONTINUED | OUTPATIENT
Start: 2024-07-23 | End: 2024-07-24 | Stop reason: HOSPADM

## 2024-07-23 RX ORDER — LIDOCAINE HYDROCHLORIDE 10 MG/ML
INJECTION INFILTRATION; PERINEURAL
Status: COMPLETED | OUTPATIENT
Start: 2024-07-23 | End: 2024-07-23

## 2024-07-23 RX ORDER — HEPARIN 100 UNIT/ML
SYRINGE INTRAVENOUS
Status: COMPLETED | OUTPATIENT
Start: 2024-07-23 | End: 2024-07-23

## 2024-07-23 RX ORDER — KETAMINE HCL IN 0.9 % NACL 50 MG/5 ML
SYRINGE (ML) INTRAVENOUS
Status: DISCONTINUED | OUTPATIENT
Start: 2024-07-23 | End: 2024-07-23

## 2024-07-23 RX ORDER — MIDAZOLAM HYDROCHLORIDE 1 MG/ML
INJECTION INTRAMUSCULAR; INTRAVENOUS
Status: DISCONTINUED | OUTPATIENT
Start: 2024-07-23 | End: 2024-07-23

## 2024-07-23 RX ORDER — OXYCODONE HYDROCHLORIDE 5 MG/1
5 TABLET ORAL
Status: DISCONTINUED | OUTPATIENT
Start: 2024-07-23 | End: 2024-07-24 | Stop reason: HOSPADM

## 2024-07-23 RX ORDER — DEXAMETHASONE SODIUM PHOSPHATE 4 MG/ML
INJECTION, SOLUTION INTRA-ARTICULAR; INTRALESIONAL; INTRAMUSCULAR; INTRAVENOUS; SOFT TISSUE
Status: DISCONTINUED | OUTPATIENT
Start: 2024-07-23 | End: 2024-07-23

## 2024-07-23 RX ORDER — SODIUM CHLORIDE 0.9 % (FLUSH) 0.9 %
10 SYRINGE (ML) INJECTION
Status: DISCONTINUED | OUTPATIENT
Start: 2024-07-23 | End: 2024-07-24 | Stop reason: HOSPADM

## 2024-07-23 RX ORDER — SODIUM CHLORIDE 9 MG/ML
INJECTION, SOLUTION INTRAVENOUS CONTINUOUS
Status: DISCONTINUED | OUTPATIENT
Start: 2024-07-23 | End: 2024-07-24 | Stop reason: HOSPADM

## 2024-07-23 RX ORDER — FENTANYL CITRATE 50 UG/ML
INJECTION, SOLUTION INTRAMUSCULAR; INTRAVENOUS
Status: COMPLETED
Start: 2024-07-23 | End: 2024-07-23

## 2024-07-23 RX ORDER — PROPOFOL 10 MG/ML
VIAL (ML) INTRAVENOUS
Status: DISCONTINUED | OUTPATIENT
Start: 2024-07-23 | End: 2024-07-23

## 2024-07-23 RX ADMIN — LIDOCAINE HYDROCHLORIDE 7 ML: 10 INJECTION, SOLUTION INFILTRATION; PERINEURAL at 08:07

## 2024-07-23 RX ADMIN — HYDROMORPHONE HYDROCHLORIDE 0.2 MG: 1 INJECTION, SOLUTION INTRAMUSCULAR; INTRAVENOUS; SUBCUTANEOUS at 09:07

## 2024-07-23 RX ADMIN — FENTANYL CITRATE 25 MCG: 50 INJECTION INTRAMUSCULAR; INTRAVENOUS at 09:07

## 2024-07-23 RX ADMIN — PROPOFOL 100 MG: 10 INJECTION, EMULSION INTRAVENOUS at 07:07

## 2024-07-23 RX ADMIN — OXYCODONE 5 MG: 5 TABLET ORAL at 09:07

## 2024-07-23 RX ADMIN — MIDAZOLAM HYDROCHLORIDE 2 MG: 2 INJECTION, SOLUTION INTRAMUSCULAR; INTRAVENOUS at 07:07

## 2024-07-23 RX ADMIN — FENTANYL CITRATE 25 MCG: 50 INJECTION INTRAMUSCULAR; INTRAVENOUS at 08:07

## 2024-07-23 RX ADMIN — HEPARIN 500 UNITS: 100 SYRINGE at 08:07

## 2024-07-23 RX ADMIN — Medication 50 MG: at 07:07

## 2024-07-23 RX ADMIN — LIDOCAINE HYDROCHLORIDE 50 MG: 20 INJECTION INTRAVENOUS at 07:07

## 2024-07-23 RX ADMIN — DEXAMETHASONE SODIUM PHOSPHATE 4 MG: 4 INJECTION, SOLUTION INTRAMUSCULAR; INTRAVENOUS at 07:07

## 2024-07-23 RX ADMIN — HYDROMORPHONE HYDROCHLORIDE 0.2 MG: 1 INJECTION, SOLUTION INTRAMUSCULAR; INTRAVENOUS; SUBCUTANEOUS at 10:07

## 2024-07-23 NOTE — ANESTHESIA PREPROCEDURE EVALUATION
07/23/2024  Ivy Salgado is a 44 y.o., female w/ Hx of chron's disease here for a chest port exchange      Pre-op Assessment    I have reviewed the Patient Summary Reports.     I have reviewed the Nursing Notes. I have reviewed the NPO Status.   I have reviewed the Medications.     Review of Systems  Anesthesia Hx:  No problems with previous Anesthesia             Denies Family Hx of Anesthesia complications.    Denies Personal Hx of Anesthesia complications.                    Hematology/Oncology:  Hematology Normal   Oncology Normal                                   EENT/Dental:  EENT/Dental Normal           Cardiovascular:     Hypertension                                        Pulmonary:  Pulmonary Normal                       Hepatic/GI:     GERD             Musculoskeletal:  Musculoskeletal Normal                Neurological:  Neurology Normal                                      Endocrine:  Endocrine Normal            Psych:   anxiety depression                Physical Exam  General: Well nourished, Cooperative, Oriented and Alert    Airway:  Mallampati: I / I  Mouth Opening: Normal  TM Distance: Normal  Tongue: Normal        Anesthesia Plan  Type of Anesthesia, risks & benefits discussed:    Anesthesia Type: Gen Natural Airway  Intra-op Monitoring Plan: Standard ASA Monitors  Post Op Pain Control Plan: multimodal analgesia and IV/PO Opioids PRN  Induction:  IV  Airway Plan: , Post-Induction  Informed Consent: Informed consent signed with the Patient and all parties understand the risks and agree with anesthesia plan.  All questions answered.   ASA Score: 3  Day of Surgery Review of History & Physical: H&P Update referred to the surgeon/provider.    Ready For Surgery From Anesthesia Perspective.     .

## 2024-07-23 NOTE — PLAN OF CARE
Pt arrived to IR for port replacement with anesthesia. CRNA at bedside. Pt oriented to unit and staff. Plan of care reviewed with patient, patient verbalizes understanding. Comfort measures utilized. Pt safely transferred from stretcher to procedural table. Fall risk reviewed with patient, fall risk interventions maintained. Safety strap applied, positioner pillows utilized to minimize pressure points. Blankets applied. Pt prepped and draped utilizing standard sterile technique. Patient placed on continuous monitoring, as required by sedation policy. Timeouts completed utilizing standard universal time-out, per department and facility policy. RN to remain at bedside, continuous monitoring maintained per CRNA. Pt resting comfortably. Denies pain/discomfort. See flow sheets for monitoring, medication administration, and updates.

## 2024-07-23 NOTE — DISCHARGE INSTRUCTIONS
Sponge bath only today.  You may shower tomorrow with dressing covered.     Remove dressing after 48 hours and leave open to air. You may wash the site with soap and water at this time.Do not soak the site until completely healed.    If there are skin tapes over the incision, leave them in place and wash over them. The skin tapes should fall off in 7-10 days. If they have not come off by themselves in 10 days ,you may remove them.    Rest and take it easy today. Do not drive for 24 hours. Gradually resume your normal activity.    If the port is in your arm, move you arm and hand normally. DO NOT hold it still.  Avoid lifting heavy objects or overusing the arm.    At home,continue with liquids and if there is no nausea, you may resume your normal diet.    If you have any discomfort, take what you normally take for pain. If it is not effective, call us.    WHEN TO CALL THE DOCTOR:    Obvious bleeding (dried blood over the incision is normal)    Redness or swelling in the affected area    Fever over 101 F (38.4C)    Drainage or pus from the wound    Pain not relieved by normal pain medication    If you have any problems or questions call us:    Normal working hours= [facility] [phone]    24/7= Call the page  at [phone] and ask for the Radiology Resident on call

## 2024-07-23 NOTE — PLAN OF CARE
Discharge instructions given and explained to patient and family with verbalization of understanding all instructions. Explained next time and doses of medication. Patients v/s stable, denies n/v and tolerating po, rates pain level tolerable, IV removed, and family at bedside for patient discharge home.

## 2024-07-23 NOTE — PLAN OF CARE
Port replacement completed, pt tolerated well. No apparent distress noted. VSS. Dressing applied CDI. Pt to be transferred to recovery, escorted by RN and CRNA. Report to be given at bedside.

## 2024-07-23 NOTE — TRANSFER OF CARE
"Anesthesia Transfer of Care Note    Patient: Ivy Salgado    Procedure(s) Performed: * No procedures listed *    Patient location: PACU    Anesthesia Type: general    Transport from OR: Transported from OR on 6-10 L/min O2 by face mask with adequate spontaneous ventilation    Post pain: adequate analgesia    Post assessment: no apparent anesthetic complications    Post vital signs: stable    Level of consciousness: awake, alert and oriented    Nausea/Vomiting: no nausea/vomiting    Complications: none    Transfer of care protocol was followed      Last vitals: Visit Vitals  /73 (BP Location: Right arm, Patient Position: Lying)   Pulse 66   Temp 36.8 °C (98.2 °F) (Temporal)   Resp 16   Ht 5' 1" (1.549 m)   Wt 52.2 kg (115 lb)   LMP 03/30/2015   SpO2 98%   Breastfeeding No   BMI 21.73 kg/m²     "

## 2024-07-23 NOTE — Clinical Note
Left: Chest and Neck.   Scrubbed with ChloroPrep With Tint.    Hair: N/A.  Skin prep dry before draping.  Prepped by: Doni Lee 7/23/2024 8:00 AM.

## 2024-07-24 NOTE — ANESTHESIA POSTPROCEDURE EVALUATION
Anesthesia Post Evaluation    Patient: Ivy Salgado    Procedure(s) Performed: * No procedures listed *    Final Anesthesia Type: general      Patient location during evaluation: PACU  Patient participation: Yes- Able to Participate  Level of consciousness: awake and alert  Post-procedure vital signs: reviewed and stable  Pain management: adequate  Airway patency: patent  PRETTY mitigation strategies: Multimodal analgesia, Preoperative use of mandibular advancement devices or oral appliances and Intraoperative administration of CPAP, nasopharyngeal airway, or oral appliance during sedation  PONV status at discharge: No PONV  Anesthetic complications: no      Cardiovascular status: blood pressure returned to baseline, hemodynamically stable and stable  Respiratory status: unassisted and spontaneous ventilation  Hydration status: euvolemic  Follow-up not needed.              Vitals Value Taken Time   /58 07/23/24 1230   Temp 36.7 °C (98 °F) 07/23/24 1230   Pulse 68 07/23/24 1230   Resp 20 07/23/24 1230   SpO2 97 % 07/23/24 1230         No case tracking events are documented in the log.      Pain/Michael Score: Pain Rating Prior to Med Admin: 6 (7/23/2024 10:00 AM)  Michael Score: 10 (7/23/2024 12:30 PM)

## 2024-07-29 ENCOUNTER — PATIENT MESSAGE (OUTPATIENT)
Dept: REHABILITATION | Facility: HOSPITAL | Age: 44
End: 2024-07-29
Payer: COMMERCIAL

## 2024-07-29 ENCOUNTER — TELEPHONE (OUTPATIENT)
Dept: GASTROENTEROLOGY | Facility: CLINIC | Age: 44
End: 2024-07-29
Payer: COMMERCIAL

## 2024-08-01 ENCOUNTER — TELEPHONE (OUTPATIENT)
Dept: INFECTIOUS DISEASES | Facility: CLINIC | Age: 44
End: 2024-08-01
Payer: COMMERCIAL

## 2024-08-01 ENCOUNTER — LAB VISIT (OUTPATIENT)
Dept: LAB | Facility: HOSPITAL | Age: 44
End: 2024-08-01
Attending: INTERNAL MEDICINE
Payer: COMMERCIAL

## 2024-08-01 DIAGNOSIS — N39.0 RECURRENT UTI: Primary | ICD-10-CM

## 2024-08-01 DIAGNOSIS — N39.0 RECURRENT UTI: ICD-10-CM

## 2024-08-01 LAB
BACTERIA #/AREA URNS AUTO: ABNORMAL /HPF
BILIRUB UR QL STRIP: NEGATIVE
CLARITY UR REFRACT.AUTO: ABNORMAL
COLOR UR AUTO: YELLOW
GLUCOSE UR QL STRIP: NEGATIVE
HGB UR QL STRIP: ABNORMAL
KETONES UR QL STRIP: NEGATIVE
LEUKOCYTE ESTERASE UR QL STRIP: ABNORMAL
MICROSCOPIC COMMENT: ABNORMAL
NITRITE UR QL STRIP: NEGATIVE
PH UR STRIP: 6 [PH] (ref 5–8)
PROT UR QL STRIP: ABNORMAL
RBC #/AREA URNS AUTO: 11 /HPF (ref 0–4)
SP GR UR STRIP: 1.02 (ref 1–1.03)
SQUAMOUS #/AREA URNS AUTO: 3 /HPF
URN SPEC COLLECT METH UR: ABNORMAL
WBC #/AREA URNS AUTO: >100 /HPF (ref 0–5)

## 2024-08-01 PROCEDURE — 87186 SC STD MICRODIL/AGAR DIL: CPT | Performed by: INTERNAL MEDICINE

## 2024-08-01 PROCEDURE — 87086 URINE CULTURE/COLONY COUNT: CPT | Performed by: INTERNAL MEDICINE

## 2024-08-01 PROCEDURE — 81001 URINALYSIS AUTO W/SCOPE: CPT | Performed by: INTERNAL MEDICINE

## 2024-08-01 PROCEDURE — 87088 URINE BACTERIA CULTURE: CPT | Performed by: INTERNAL MEDICINE

## 2024-08-01 RX ORDER — CEPHALEXIN 500 MG/1
500 CAPSULE ORAL EVERY 12 HOURS
Qty: 14 CAPSULE | Refills: 0 | Status: SHIPPED | OUTPATIENT
Start: 2024-08-01 | End: 2024-08-09

## 2024-08-01 NOTE — TELEPHONE ENCOUNTER
Patient with urinary burning and frequency    Prior urine culture with intermediate resistance to nitrofurantoin  Will send prescription for cephalexin 500 mg PO BID

## 2024-08-03 LAB — BACTERIA UR CULT: ABNORMAL

## 2024-08-05 ENCOUNTER — PATIENT MESSAGE (OUTPATIENT)
Dept: SPORTS MEDICINE | Facility: CLINIC | Age: 44
End: 2024-08-05
Payer: COMMERCIAL

## 2024-08-05 ENCOUNTER — TELEPHONE (OUTPATIENT)
Dept: GASTROENTEROLOGY | Facility: CLINIC | Age: 44
End: 2024-08-05
Payer: COMMERCIAL

## 2024-08-05 ENCOUNTER — PATIENT MESSAGE (OUTPATIENT)
Dept: ENDOSCOPY | Facility: HOSPITAL | Age: 44
End: 2024-08-05
Payer: COMMERCIAL

## 2024-08-07 ENCOUNTER — TELEPHONE (OUTPATIENT)
Dept: GASTROENTEROLOGY | Facility: CLINIC | Age: 44
End: 2024-08-07
Payer: COMMERCIAL

## 2024-08-08 ENCOUNTER — PATIENT MESSAGE (OUTPATIENT)
Dept: REHABILITATION | Facility: HOSPITAL | Age: 44
End: 2024-08-08

## 2024-08-12 ENCOUNTER — PATIENT MESSAGE (OUTPATIENT)
Dept: REHABILITATION | Facility: HOSPITAL | Age: 44
End: 2024-08-12
Payer: COMMERCIAL

## 2024-08-13 ENCOUNTER — CLINICAL SUPPORT (OUTPATIENT)
Dept: REHABILITATION | Facility: HOSPITAL | Age: 44
End: 2024-08-13
Payer: COMMERCIAL

## 2024-08-13 ENCOUNTER — OFFICE VISIT (OUTPATIENT)
Dept: GASTROENTEROLOGY | Facility: CLINIC | Age: 44
End: 2024-08-13
Payer: COMMERCIAL

## 2024-08-13 VITALS
HEART RATE: 57 BPM | TEMPERATURE: 98 F | HEIGHT: 61 IN | WEIGHT: 100.06 LBS | OXYGEN SATURATION: 98 % | DIASTOLIC BLOOD PRESSURE: 81 MMHG | SYSTOLIC BLOOD PRESSURE: 114 MMHG | BODY MASS INDEX: 18.89 KG/M2

## 2024-08-13 DIAGNOSIS — M25.612 DECREASED RANGE OF MOTION OF LEFT SHOULDER: Primary | ICD-10-CM

## 2024-08-13 DIAGNOSIS — K50.018 CROHN'S DISEASE OF SMALL INTESTINE WITH OTHER COMPLICATION: Primary | ICD-10-CM

## 2024-08-13 PROCEDURE — 3079F DIAST BP 80-89 MM HG: CPT | Mod: CPTII,S$GLB,, | Performed by: INTERNAL MEDICINE

## 2024-08-13 PROCEDURE — 97112 NEUROMUSCULAR REEDUCATION: CPT

## 2024-08-13 PROCEDURE — 3044F HG A1C LEVEL LT 7.0%: CPT | Mod: CPTII,S$GLB,, | Performed by: INTERNAL MEDICINE

## 2024-08-13 PROCEDURE — G2211 COMPLEX E/M VISIT ADD ON: HCPCS | Mod: S$GLB,,, | Performed by: INTERNAL MEDICINE

## 2024-08-13 PROCEDURE — 3074F SYST BP LT 130 MM HG: CPT | Mod: CPTII,S$GLB,, | Performed by: INTERNAL MEDICINE

## 2024-08-13 PROCEDURE — 99215 OFFICE O/P EST HI 40 MIN: CPT | Mod: S$GLB,,, | Performed by: INTERNAL MEDICINE

## 2024-08-13 PROCEDURE — 97140 MANUAL THERAPY 1/> REGIONS: CPT

## 2024-08-13 PROCEDURE — 1159F MED LIST DOCD IN RCRD: CPT | Mod: CPTII,S$GLB,, | Performed by: INTERNAL MEDICINE

## 2024-08-13 PROCEDURE — 3008F BODY MASS INDEX DOCD: CPT | Mod: CPTII,S$GLB,, | Performed by: INTERNAL MEDICINE

## 2024-08-13 PROCEDURE — 1160F RVW MEDS BY RX/DR IN RCRD: CPT | Mod: CPTII,S$GLB,, | Performed by: INTERNAL MEDICINE

## 2024-08-13 RX ORDER — CEPHALEXIN 500 MG/1
500 CAPSULE ORAL EVERY 12 HOURS
COMMUNITY

## 2024-08-13 NOTE — PATIENT INSTRUCTIONS
- continue entyvio every 8 weeks   - let us know if you have a UTI close to your infusion  - continue to follow up with ID and urology   - get flu shot and covid booster   - ileoscopy in 6/2025

## 2024-08-13 NOTE — PROGRESS NOTES
[facility]             Inflammatory Bowel Disease   Follow-up  Note              TODAY'S VISIT DATE:  8/13/2024    Chief Complaint:   Chief Complaint   Patient presents with    Crohn's Disease     PCP: Luis Madden    Previous History:  Ivy Salgado is a 44 y.o. female with Crohn's disease with end ileostomy and recurrent ileitis, recurrent C diff (2014 x 2), ARPITA/depression, arthritis, h/o abnormal pap smear- LYLA I, nephrolithiasis-nonobstructive, GERD, long QTc syndrome (Type III, on nadolol), s/p SHELLIE (2015) for recurrent ovarian cysts and endometriosis, early evolving melanoma in situ (buttocks and para-stomal site, excised in 2018 and 2019 respectively), history of genital HSV, imuran induced pancreatitis, pancreatic tail cyst, recurrent UTI (h/o ESBL 2018), multiple thyroid nodules, osteopenia, left femoral head chronic avascular necrosis, history bacterial vaginosis, h/o abnormal LFTs (elevated ALP since 2016), family history of colon cancer.  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  She tried multiple meds including imuran (pancreatitis, remicade (secondary nonresponder), humira (DIL), MTX (leukopenia).  She met Dr. Cameron initially in GI clinic at [facility] 5/2009 at which time she had some diarrhea and on pred 30 mg/d. Colonoscopy 6/2009 significant for diffuse proctosigmoiditis with remainder of colon normal and end to end ileocolonic anastomsis with inflammation. Placed on rowasa enemas but too expensive so switched to steroid enemas.  Flex sig 10/2009 ongoing proctosigmoiditis.  In 2010 she had rectal bleeding that improved with proctofoam and started cimzia with improvement of symptoms.  In 4/2010 she had CT showing circumferential bowel wall thickening of the distal descending and sigmoid with small caliber origin of celiac artery  and referred to vascular for median arcuate ligament syndrome.  Colonoscopy c/w moderate proctosigmoiditis and in 5/2010 she continued proctofoam BID and took extra dose of cimzia to induce remission.  In 7/2010 flex sig confirmed active left sided colitis and she started prednisone in 3/2011 with repeat colonoscopy 8/2011 c/w ongoing rectosigmoid inflammation with mild mucosal ulcer the transverse colon with ileocolonic anastomotic stricture and normal TI. In 9/2011 pt was on canasa, cimzia and prednisone taper.  In April/May 2012 pt hospitalized and CT c/w sigmoid wall thickening and colonoscopy confirmed distal 30 cm with moderate inflammation with ileocolonic anastomotic ulcers and normal TI.  In 6/2012 Dr Parsons proceeded with completion proctocolectomy with end ileostomy with pathology confirming transmural active inflammation with abscess c/w Crohn's disease.  Post-operatively she had peristomal ulcer and perineal wounds. In 1/2013 patient had non-healing perineal wound S/P debridement  with steroids and treated with cipro. In 8/2013 she had EUA with debridement and fulguration of non-healing perineal wound.  In 8/2013 pt had epigastric abd pain and EGD c/w benign gastric polyp, labs normal, CT c/w short segment WB thickening involving ileum proximal to the stoma and resolution of right adnexal mass.  In 9/2013 ileoscopy through stoma significant for segment mucosa at 5 cm proximal to stoma mild congested, eroded, ulcerated area of 6-30 cm and mucosal distal is normal. CT A/p 1/2024 c/w 2 separate segments of SB proximal to the ostomy and near staple row in RLQ with mild enhancement and wall thickening c/w mild inflammatory changes and segment of bowel forming ostomy.  SB enteroscopy 2/2014 significant for single 2 mm ulcer in the proximal ileum.  In 2014 she had 2 hospitalizations for high ileostomy output with was treated with prednisone with taper.  In 10/2014 CT showed few mild dilated loops SB in  anterior right pelvis and loop with surgical suture line abuts anterior pelvic wall and possible adhesion. In 10/2014 ileoscopy through stomal normal.  In 1/2015 pt had focal segment of mild distal SB dilation and C diff positive and pt treated antibiotics followed by probiotics and resumed cimzia. Dr. Cameron then referred patient to Dr. Parish Ross in 2015 and he mentions recurrent C diff, recurrent SBOs likely related to adhesions. In 2015 she had SHELLIE/BSO with bladder repair and due to this missed one dose of cimzia and then resumed 5/2015. In 1/2016 she had partial SBO due to adhesions and UGI/SBFT and CT did not show fixed obstruction. In 10/2016 she reported to Dr. Ross postprandial nausea and epigastric pain, weight loss, fatigue, low grade fever and watery stool output and prednisone helped symptoms but not as good response as past. She was off of cimzia 8-9/2016 though sx occurred prior to holding cimzia. Ileoscopy through stoma 10/2016 significant for normal 15 cm of ileum examined. Overall Dr. Ross felt that her symptoms responded well to prednisone and restarting cimzia. She presented to her OV 1/2017 with increased ileostomy output with C diff negative with symptoms ongoing and MRE 5/2017 c/w long segment of diffuse wall thickening and mucosal enhancement involving the distal ileum. CT A/P 6/2017 significant for left ovarian cyst, mild biliary dilation stable, hypodensities and punctate bilateral renal calcifications.  Ileoscopy through stoma 8/2017 c/w normal ileum from stoma to 15-20 cm. CT A/P 11/2017 significant for righ adnexal mass abutting theright external iliac vein and pelvic US recommended, bilateral renal hypodensities and calcifications. MRE 11/2017 c/w mild questionable ileal inflammation and luminal narrowing involving short segment of SB within the right hemipelvis with mild dilation and upstream bowel 2.5 cm with findings concerning for developing stricture. She stopped cimzia 6/2017  and started stelara 7/11/2017 though discontinued in 1/2018 due to rash.  Due to symptoms pt was started in 1/2018 on prednisone 10 mg/d, bentyl. In 2/2018 CT A/P c/w bilateral nonobstructing renal calculi, mild bilateral cortical scarring of lower renal poles. MRE 9/2018 significant for short segment of distal ileum with scattered regions of non uniform wall thickening and possible additional short segment of proximal jejunum with mild wall thickening. In fall 2018 she was placed on entocort though due to fast transit was opening capsule and taking this. CT A/P 12/2018 c/w several bilateral renal hypodensities c/w cysts, nonobstructive bilateral nephrolithiasis, right adnexal complex cyst. She started entyvio 11/20/18 and due to recurrent UTIs and palpitations (dx QT prolongation and started on beta blocker) so discontinued in 10/27/20. MRE 5/15/19 significant for right adnexal cystic lesion, large left adnexal cystic lesion.  On 5/30/19 patient had exploratory laparotomy with lysis of adhesions, BSO/mass resection. CT A/P 6/2019 significant for nonspecific rim enhancing fluid collection, mild left hydroureteronephrosis. In 2019 there were several mos she held entyvio due to gyn and melanoma surgery. MRE 5/2020 significant for small non enhancing fluid collection within presacral region superior to the vaginal cuff, right sided pelvocaliectasis, bilateral cortical renal cysts, left femoral head chronic avascular necrosis. CT A/P 9/2020 c/w mild left hydrouriteronephrosis due to distal left ureter stone with ass urothelial thickening and enhancement.  MRE 4/2021 right ovarian cyst likely hemorrhagic cyst.  In 1/2022 patient was placed by Dr. Ross on pantoprazole 40 mg BID. For joint pains and chronic abdominal pain Dr. Ross treated her with gabapentin for several years. In 1/2023 she had multiple intrarenal stones.  Repeat CT 10/2023 small non-obstructing renal stones. She was hospitalized 12/31/23-1/7/24 for high  ileostomy output with stool studies neg for infection.  Elevated inflammatory markers (ESR 94, CRP 22.8), stool caprotectin 281. CT A/P showed slow flow through few mid to distal SB loops noting venous vascular engorgement about these loops and wall hyperemia. On 1/3/24 she had EGD c/w mild HP neg gastritis and ileoscopy through stoma with about 6 apthous ulcers in the distal 20 cm of the ileum and biopsies c/w active inflammation.  Pt had elevated LFTs (peaked 1/4/24 ///TB normal).  Hepatology consulted and serological workup negative and subsequent LFTs normalized with no intervention. Abdominal US revealed mildly dilated bile ducts in the central right hepatic lobe and MRCP c/w no evidence of biliary obstruction, punctate cystic focus in the pancreas tail likely side IPMN and f/u in 1 year recommended.  She was treated with antidiarrheals (imodium helped but lomotil caused palpitations) and IVFs. Lotronex was being considered but due to prolonged QT unable to proceed with this. Due to mild ileitis plan for outpatient entyvio.  Since her discharge from hospital she had continued high ileostomy output and dehydration and we recommended that she return for hospitalization but pt did not wish to go to ER.  She continued with high ileostomy output up to 8 liters/day but if fasting down to 4 liters/day despiate taking imodium 1 tab QID.  She is not taking lomotil due to palpitations.  At her OV 1/16/24 due to high ileostomy output and dehydration I proceeded with hospital admission 1/16/24-1/27/24 at which time she continued on IVFs, antidiarrheals, pain meds and started on IV solumedrol with repeat stool calprotectin done on 1/16/24 93.9 though unclear accuracy given some granulation tissue on stoma could influence this test. On 1/24/24 stool calprotectin 117.6, ileoscopy through stoma with one small apthous ulcer 19 cm proximal to stoma. She had stool studies neg for infection and then discharged  home on liquid imodium, pred 40 mg with taper and plan to start entyvio.   She restarted entyvio with the re induction doses on 1/30/2024 then resumed every 8 weeks infusions and by her office visit in 3/2024 patient no longer needed IVFs and was recovering well from the hospital stay. She reported recurrent UTIs with entyvio in the past and was advised to closely monitor her symptoms and notify our clinic.     Interval History:  - current IBD meds:  Entyvio q 8 weeks (11/20/18- 10/27/20- stopped due to UTIs and palpitations, reinduced 1/30/24, LD 7/2, ND 8/27)  - other medications: liquid imodium PRN; phenergan PRN for nausea ~2x/week  - recent medications: Keflex course for recent UTI;  methenamine/ sodium hippurate used for 2 weeks for UTI prevention but made her nauseated so she self d/c  - ileostomy output: 3-4 full bags, variable consistency liquid to soft; no abdominal pain  - denies any issues around the stoma   - reports mild vaginal discharge and itching as she is recovering from UTI - but not very concerned since symptoms are mild   - 4/2 - covid + then soon after recurrent UTI; was started on nitrofurantoin which she should have finished on 4/19, did not affect entyvio dose  - 4/16 - 4/18 - hospitalized for abdominal pain and decreased ileostomy output. Workup for partial SBO. Followed by CRS and GI. ID consulted for recurrent UTI: recommended post coital voiding, and vitamin C and methenamine/ sodium hippurate 1 g twice daily orally  - 5/9 - canceled her entyvio infusion, reported not feeling well, had yeast infection and was taking monistat OTC. Also reported irritation around the stoma (red and patchy around the stoma). Finished monistat on 5/14. Got entyvio on 5/17 then resumed original schedule on 7/2  - 5/17 - MRE showed short-segmented stricture with signs of mild active inflammation adjacent to the RLQ surgical anastomosis with tethering and distortion makes an underlying fistula difficult to  exclude.    -  - ileoscopy: Patent end ileostomy with healthy appearing mucosa in the terminal ileum. The examined portion of the ileum was normal. 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       Last pertinent Endoscopy/Imagin23 CT A/P slow flow through few mid to distal SB loops noting venous vascular engorgement about these loops and wall hyperemia  1/3/24 EGD: normal except for gastric erythema, bx c/w mild HP neg gastritis   1/3/24 ileoscopy through stoma:  6 apthous ulcers in the distal 20 cm of the ileum and biopsies c/w active inflammation  1/3/24 abd US:  mildly dilated bile ducts in the central right hepatic lobe   24 MRCP c/w no evidence of biliary obstruction, punctate cystic focus in the pancreas tail likely side IPMN  24 MRE: short-segmented stricture with signs of mild active inflammation adjacent to the RLQ surgical anastomosis with tethering and distortion makes an underlying fistula difficult to exclude.   24 Ileoscopy: Patent end ileostomy with healthy appearing mucosa in the terminal ileum. The examined portion of the ileum was normal. Biopsies normal     Therapeutic Drug Monitoring Labs:  None     Prior IBD Therapies:  Proctofoam - partially effective  imuran (pancreatitis)  MTX (leukopenia)  Remicade-  secondary nonresponder  Humira- discontinued due to drug induced lupus due to joint pains  Entocort- fall 2018, broke open capsule when taking- not sure if effective   Cimzia 6527-3626- secondary nonresponder  Stelara (7/11/17-1/2018)- discontinued due to rash  Canasa ineffective   Prednisone- effective- last took 1-2/2024    Vaccinations:  Lab Results   Component Value Date    HEPBSAB >1000.00 01/02/2024    HEPBSAB Reactive 01/02/2024     Lab Results   Component Value Date    HEPAIGG Non-reactive 01/02/2024     Lab Results   Component Value Date    VARICELLAZOS 883.00 01/02/2024    VARICELLAINT Positive 01/02/2024     Lab Results   Component Value Date    MUMPSIGGSCRE 26.30 01/02/2024    MUMPSIGGINTE Positive 01/02/2024      Lab Results   Component Value Date    RUBEOLAIGGAN 35.60 01/02/2024    RUBEOLAINTER Positive 01/02/2024     Immunization History   Administered Date(s) Administered    COVID-19, MRNA, LN-S, PF (Pfizer) (Purple Cap) 12/21/2020, 01/13/2021, 09/07/2021    Hepatitis A, Adult 02/12/2024    Hepatitis B (recombinant) Adjuvanted, 2 dose 12/26/2019, 01/23/2020    Influenza 09/18/2013, 10/25/2022    Influenza - Quadrivalent - PF *Preferred* (6 months and older) 09/18/2013, 10/04/2016, 11/17/2017, 09/13/2018, 10/02/2019, 09/10/2020, 09/24/2021, 10/25/2022, 10/09/2023    Influenza Split 09/18/2013    Pneumococcal Conjugate - 13 Valent 06/28/2017    Pneumococcal Conjugate - 20 Valent 01/30/2024    Pneumococcal Polysaccharide - 23 Valent 08/19/2015    Td (ADULT) 06/28/2013    Tdap 12/29/2019   Flu shot: recommended yearly   COVID vaccine/booster:  per CDC recommendations  RSV:  after age 59 yo  Tetanus (Tdap):  due 12/2029  HPV: declined  Meningococcal: NA  Hepatitis A:  due anytime 8/2024 to 2/2025  Shingrix: recommended today, pt deferred and will call to schedule     Review of Systems   Constitutional:  Positive for fever. Negative for chills and weight loss.   HENT:          No oral ulcers, dysphagia,  oral thrush   Eyes:  Negative for blurred vision, pain and redness.   Respiratory:  Negative for cough and shortness of breath.    Cardiovascular:  Negative for chest pain.   Gastrointestinal:  Positive for heartburn and nausea. Negative for abdominal pain and vomiting.   Genitourinary:  Positive for dysuria and hematuria.   Musculoskeletal:  Positive for back pain. Negative for joint pain.   Skin:  Negative for rash.   Psychiatric/Behavioral:  Negative for depression. The patient is nervous/anxious. The patient does not have insomnia.      All Medical History/Surgical History/Family History/Social History/Allergies have been reviewed and updated in EMR    Outpatient Medications Marked as Taking for the 8/13/24 encounter (Office Visit) with Peace Garcia MD   Medication Sig Dispense Refill    clonazePAM (KLONOPIN) 1 MG tablet Take 1 tablet (1 mg total) by mouth 2 (two) times daily. 60 tablet 2    droNABinol (MARINOL) 5 MG capsule Take 1 capsule (5 mg total) by mouth 2 (two) times daily before meals. 60 capsule 1    estradiol 0.025 mg/24 hr 0.025 mg/24 hr Place 1 patch onto the skin once a week. (Patient taking differently: Place 1 patch onto the skin once a week. CHANGES ON MONDAYS) 4 patch 11    gabapentin (NEURONTIN) 300 MG capsule Take 1 capsule (300 mg total) by mouth 2 (two) times daily. 60 capsule 5    loperamide (IMODIUM) 1 mg/7.5 mL solution Take 4 mg by mouth 3 (three) times daily as needed for Diarrhea.      mirtazapine (REMERON SOL-TAB) 30 MG disintegrating tablet Take 1 tablet (30 mg total) by mouth nightly. 90 tablet 2    multivitamin (THERAGRAN) per tablet Take 1 tablet by mouth once daily.      nadoloL (CORGARD) 40 MG tablet Take 1 tablet (40 mg total) by mouth once daily. Patient needs appointment before next refill. 90 tablet 7    pantoprazole (PROTONIX) 40 MG tablet Take 1 tablet (40 mg total) by mouth 2 (two) times daily. 60 tablet 12    promethazine (PHENERGAN) 25 MG tablet Take 1 tablet (25  "mg total) by mouth every 6 (six) hours as needed for Nausea. 30 tablet 0    valACYclovir (VALTREX) 500 MG tablet Take 1 tablet (500 mg total) by mouth once daily. 90 tablet 3    vedolizumab (ENTYVIO) 300 mg SolR injection Inject 300 mg into the vein every 8 weeks.      zolpidem (AMBIEN) 10 mg Tab Take 1 tablet (10 mg total) by mouth every evening. 30 tablet 2     Current Facility-Administered Medications for the 8/13/24 encounter (Office Visit) with Peace Garcia MD   Medication Dose Route Frequency Provider Last Rate Last Admin    [DISCONTINUED] estradiol valerate (DELESTROGEN) injection 20 mg/mL  20 mg Intramuscular Q30 Days Gilson El MD   20 mg at 12/30/22 0902     Vital Signs:  /81 (BP Location: Left arm, Patient Position: Sitting, BP Method: Small (Automatic))   Pulse (!) 57   Temp 97.9 °F (36.6 °C) (Temporal)   Ht 5' 1" (1.549 m)   Wt 45.4 kg (100 lb 1.4 oz)   LMP 03/30/2015   SpO2 98%   BMI 18.91 kg/m²      Physical Exam  Abdominal:      General: Bowel sounds are normal. There is no distension.      Palpations: Abdomen is soft.      Tenderness: There is no abdominal tenderness.     Labs:  Lab Results   Component Value Date    CRP 1.6 04/16/2024    CALPROTECTIN 117.6 (H) 01/24/2024     Lab Results   Component Value Date    HEPBSAG Non-reactive 03/04/2024    HEPBCAB Non-reactive 01/02/2024     Lab Results   Component Value Date    TBGOLDPLUS Negative 01/02/2024     Lab Results   Component Value Date    GDCALQNK61ZR 21 (L) 06/13/2019    UZAYHHQW96 278 01/02/2024     Lab Results   Component Value Date    WBC 5.64 07/02/2024    HGB 13.1 07/02/2024    HCT 41.2 07/02/2024    MCV 82 07/02/2024     07/02/2024     Lab Results   Component Value Date    CREATININE 0.8 07/02/2024    ALBUMIN 4.1 07/02/2024    BILITOT 0.5 07/02/2024    ALKPHOS 108 07/02/2024    AST 25 07/02/2024    ALT 14 07/02/2024     Assessment/Plan:  Ivy Salgado is a 44 y.o.  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      Since the last visit patient is doing well from a GI perspective and reports overall improvement while on entyvio every 8 weeks. MRE from 5/2024 showed signs of active inflammation. However the ileoscopy in 6/2024 showed no active disease and biopsies of small bowel were c/w no active inflammation. Patient continues to have recurrent UTIs, has had 4 since 3/2024 when she was last seen in clinic. Unlikely that entyvio is directly responsible for UTIs as it is a gut specific agent with the least infection risk among biologics. Discussed risks and benefits of using a biologic to keep Crohn's disease under good control vs risk of recurrent UTIs.  Recommended to continue current treatment and reassess disease activity inI a year with another ileoscopy.     # Crohn's disease with end ileostomy and recurrent ileitis:  - high ileostomy output- resolved, no longer requiring IVFs, continues liquid imodium as needed  - note melanoma, genital HSV- on valtrex prophylaxis, h/o endometriosis, recurrent UTIs  - continue entyvio every 8 weeks   - ileoscopy through stoma- clear liquids day before- yearly - 6/2025  - repeat stool calprotectin- will consider in future if needed but not sure if correlative with inflammation given stoma granulation tissue   - drug monitoring labs: CBC/CMP q 6 mos (1/2025), TPMT (normal metabolizer 1/2024), TB  quantiferon (neg 1/2025), Hep B testing (HBsAg, HBtotalcoreAb neg 1/2025)    # Recurrent UTI  - in past entyvio discontinued due to UTIs; 4 episodes of UTI in last 5 months  - pt understands risk vs. benefits of continuing entyvio  - did not tolerate hiprex as recommended per ID  - continue to follow up with ID and urology     # Elevated liver tests (AST/ALT, ALP)  - AST/ALT now resolved with serologic w/u 9/2023 acute hep panel neg, 11/2023 HBsAg, HBcAb/SAURAV/AMA/ASMA neg, iron studies neg; 1/2024 normal ceruloplasmin A1AT normal  - due to elevated ALP (since 2019) MRCP done with no findings of PSC though pt has family hx PSC    # Pancreatic tail cyst (CT 12/2023)  - repeat CT in 12/2024  - follow up with Dr. Mercado in clinic    # 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 Simon- reminded to make f/u appt   - skin exam yearly- due for skin exam now    # Bone health:  - left hip avascular necrosis on past CT  - osteopenia- has kidney stones so calcium supplements would defer or consult with renal/urology  - vit D deficiency- recommend 5000 IU 2 tabs daily - currently on MVI only. Will check Vit D level with next labs     # Immunodeficiency due to long term immunosuppressive drug therapy and IBD specific health maintenance:  CRC risk- no risk, TAC  Pap smear- SHELLIE with BSO; yearly   Vaccines- no live vaccines, complete Hep A series with upcoming infusion, pt to let us know when she wants to come in for shingrix vaccine    I personally examined the patient and discussed above plan in collaboration with Shalini Mehta, TonyD.      Total time: 40 min    Visit today is associated with current or anticipated ongoing medical care related to this patient's single serious condition/complex condition Crohn's disease.     Follow up in about 6 months (around 2/13/2025).    Peace Garcia MD  Department of Gastroenterology  Medical Director, Inflammatory  Bowel Disease

## 2024-08-14 NOTE — PROGRESS NOTES
[facility]   Physical Therapy Treatment Note     Name: Ivy Salgado  Clinic Number: [id number]    Therapy Diagnosis:   Encounter Diagnosis   Name Primary?    Decreased range of motion of left shoulder Yes         Physician: Mono Giles III, *    Visit Date: 8/13/2024     Evaluation Date: 8/8/2023  Authorization Period Expiration: 7/6/2024  Plan of Care Expiration: 12/31/2023  Progress Note Due: 9/10/2023  Visit # / Visits authorized: 24/40  Foto: 3/3     Time In: 1440  Time Out: 1540  Total Billable Time: 30 minutes (PT tech extender used this session)    DOS: 7/18/2023  S/p: left  1. Total shoulder arthroplasty (complex, -22 modifier)  2. Shoulder lysis of adhesions  3. Shoulder subpectoral biceps tenodesis (CPT 85695)  Post-Op Precautions: We will follow the shoulder arthroplasty guidelines. The patient remained in a sling for 6 weeks. We will start PT at the 3-week martina.       Precautions: none    SUBJECTIVE     Pt reports: she is feeling really good.     She was compliant with home exercise program.  Response to previous treatment: improvement in pain at rest   Functional change: able to ttol reaching overhead a bit better    Pain: 3/10  Location: left shoulder      OBJECTIVE     (+) radial ULNTT L with shoulder IR pre ; with shld extension post manual     U/E MMT Left Right   Shoulder Flexion 4-/5 4+/5   Shoulder Extension 3+/5 4+/5   Shoulder Abduction 4-/5 4+/5   Shoulder IR 4+/5 5/5   Elbow Flexion  4/5 5/5   Elbow Extension 4/5 5/5     Shld Flexion L: 130 deg     Treatment       Extender used during treatment to A with care.     Ivy received the treatments listed below:      Neuromuscular re-education activities to improve: motor control for 38 min    2 rounds of each:   Radial N glides standing elbow only 20x   Wall UT level 1 shrugs 3x10   Thoracic rotations 15x B     UBE 3m/3m level 2     Manual therapy techniques: Joint mobilizations were applied to the: L shoulder  for 10 minutes, including:  Thoracic manip   Shoulder post GHJ mobs / inf GHJ mobs         Patient Education and Home Exercises     Home Exercises Provided and Patient Education Provided     Education provided:   - Add new exercises     Written Home Exercises Provided: yes. Exercises were reviewed and Ivy was able to demonstrate them prior to the end of the session.  Ivy demonstrated good  understanding of the education provided. See EMR under Patient Instructions for exercises provided during therapy sessions    ASSESSMENT     Due to the patient's change of status affecting her L shoulder function, focused on decreasing her neural tension and irritation to improve tolerance to using her L shoulder. The pt tolerated therex and manual well with mild improvement in symptoms.     Ivy Is progressing well towards her goals.     Pt prognosis is Fair.     Pt will continue to benefit from skilled outpatient physical therapy to address the deficits listed in the problem list box on initial evaluation, provide pt/family education and to maximize pt's level of independence in the home and community environment.     Pt's spiritual, cultural and educational needs considered and pt agreeable to plan of care and goals.     Anticipated barriers to physical therapy: none     Goals:   Short Term Goals: 12 weeks  1. Pt will be compliant with HEP 50% of prescribed amount. met  2. The pt to demo improvement in R shoulder PROM to by 80% of the L met  3.  The pt to demo tolerance to being out of the sling for 24 hours with pain <2/10 met     Long Term Goals:  36 weeks   Pt will be compliant with % of prescribed amount.  met  The pt to improvement in AROM of L shoulder within 80% of R shoulder to improve tolerance to OH movement  met  The pt to  Demo at least 4+/5 of RTC muscle testing to demo improvement in tolerance to activity progressing, not met  The pt to tolerate lifting 50# from the ground to waist height per job  requirement description progressing, not met  The pt will report full participation in ADLs and IADLs without restrictions related to L Shoulder.  progressing, not met    PLAN     Follow TSA procedure     Dorothy Basurto, PT,

## 2024-08-15 DIAGNOSIS — M25.512 CHRONIC LEFT SHOULDER PAIN: ICD-10-CM

## 2024-08-15 DIAGNOSIS — Z98.890 S/P SHOULDER SURGERY: ICD-10-CM

## 2024-08-15 DIAGNOSIS — G89.29 CHRONIC LEFT SHOULDER PAIN: ICD-10-CM

## 2024-08-15 RX ORDER — CYCLOBENZAPRINE HCL 10 MG
10 TABLET ORAL NIGHTLY
Qty: 30 TABLET | Refills: 2 | Status: SHIPPED | OUTPATIENT
Start: 2024-08-15

## 2024-08-20 ENCOUNTER — PATIENT MESSAGE (OUTPATIENT)
Dept: REHABILITATION | Facility: HOSPITAL | Age: 44
End: 2024-08-20

## 2024-08-20 ENCOUNTER — CLINICAL SUPPORT (OUTPATIENT)
Dept: REHABILITATION | Facility: HOSPITAL | Age: 44
End: 2024-08-20
Payer: COMMERCIAL

## 2024-08-20 DIAGNOSIS — M25.612 DECREASED RANGE OF MOTION OF LEFT SHOULDER: Primary | ICD-10-CM

## 2024-08-20 PROCEDURE — 97112 NEUROMUSCULAR REEDUCATION: CPT

## 2024-08-20 PROCEDURE — 97530 THERAPEUTIC ACTIVITIES: CPT

## 2024-08-20 PROCEDURE — 97140 MANUAL THERAPY 1/> REGIONS: CPT

## 2024-08-20 NOTE — PROGRESS NOTES
[facility]   Physical Therapy Treatment Note     Name: Ivy Salgado  Clinic Number: [id number]    Therapy Diagnosis:   Encounter Diagnosis   Name Primary?    Decreased range of motion of left shoulder Yes         Physician: Mono Giles III, *    Visit Date: 8/20/2024     Evaluation Date: 8/8/2023  Authorization Period Expiration: 7/6/2024  Plan of Care Expiration: 12/31/2023  Progress Note Due: 9/10/2023  Visit # / Visits authorized: 25/40  Foto: 3/3     Time In: 1500  Time Out: 1600  Total Billable Time: 60 minutes (PT tech extender used this session)    DOS: 7/18/2023  S/p: left  1. Total shoulder arthroplasty (complex, -22 modifier)  2. Shoulder lysis of adhesions  3. Shoulder subpectoral biceps tenodesis (CPT 15026)  Post-Op Precautions: We will follow the shoulder arthroplasty guidelines. The patient remained in a sling for 6 weeks. We will start PT at the 3-week martina.       Precautions: none    SUBJECTIVE     Pt reports: Improvement in her neural symptoms, no longer has radiating pain down her arm     She was compliant with home exercise program.  Response to previous treatment: improvement in pain at rest   Functional change: able to ttol reaching overhead a bit better    Pain: 3/10  Location: left shoulder      OBJECTIVE     (+) radial ULNTT L with shoulder IR pre ; with shld extension post manual     U/E MMT Left Right   Shoulder Flexion 5/5 4+/5   Shoulder Extension 4+/5 4+/5   Shoulder Abduction 4+/5 4+/5   Shoulder IR 4+/5 5/5   Elbow Flexion  4+/5 5/5   Elbow Extension 4+/5 5/5     Shld Flexion L: 130 deg     Treatment       Extender used during treatment to A with care.     Ivy received the treatments listed below:      Neuromuscular re-education activities to improve: motor control for 28 min    2 rounds of each:    Radial N glides standing elbow only 20x    Wall UT level 1 shrugs 3x10    Thoracic rotations 15x B   Plank c alt shld taps 3 x 20   OH press 2# 4 x 8      Manual therapy techniques: Joint mobilizations were applied to the: L shoulder for 08 minutes, including:  Thoracic manip   ROM assessment     Therapeutic activities to improve functional performance for 24 minutes, including:  Bicep curls 3# 4 x 8   Triceps ext gtb  x 8   Shld flex 2# 4 x 8   Shld scaption 2# 4 x 8   Farmer's carry 10# 3 x 30 yds        Patient Education and Home Exercises     Home Exercises Provided and Patient Education Provided     Education provided:   - Add new exercises     Written Home Exercises Provided: yes. Exercises were reviewed and Ivy was able to demonstrate them prior to the end of the session.  Ivy demonstrated good  understanding of the education provided. See EMR under Patient Instructions for exercises provided during therapy sessions    ASSESSMENT     The patient presents with sig improvement in neural sx allowing progression back to strength and function based exercises. Plan to see the patient back in 2 weeks to determine continued POC or discharge to comprehensive HEP.     Ivy Is progressing well towards her goals.     Pt prognosis is Fair.     Pt will continue to benefit from skilled outpatient physical therapy to address the deficits listed in the problem list box on initial evaluation, provide pt/family education and to maximize pt's level of independence in the home and community environment.     Pt's spiritual, cultural and educational needs considered and pt agreeable to plan of care and goals.     Anticipated barriers to physical therapy: none     Goals:   Short Term Goals: 12 weeks  1. Pt will be compliant with HEP 50% of prescribed amount. met  2. The pt to demo improvement in R shoulder PROM to by 80% of the L met  3.  The pt to demo tolerance to being out of the sling for 24 hours with pain <2/10 met     Long Term Goals:  36 weeks   Pt will be compliant with % of prescribed amount.  met  The pt to improvement in AROM of L shoulder within 80% of  R shoulder to improve tolerance to OH movement  met  The pt to  Demo at least 4+/5 of RTC muscle testing to demo improvement in tolerance to activity progressing, not met  The pt to tolerate lifting 50# from the ground to waist height per job requirement description progressing, not met  The pt will report full participation in ADLs and IADLs without restrictions related to L Shoulder.  progressing, not met    PLAN     Follow TSA procedure     Dorothy Basurto, PT,

## 2024-08-29 ENCOUNTER — PATIENT MESSAGE (OUTPATIENT)
Dept: GASTROENTEROLOGY | Facility: CLINIC | Age: 44
End: 2024-08-29
Payer: COMMERCIAL

## 2024-08-29 ENCOUNTER — PATIENT MESSAGE (OUTPATIENT)
Dept: INTERNAL MEDICINE | Facility: CLINIC | Age: 44
End: 2024-08-29
Payer: COMMERCIAL

## 2024-08-29 DIAGNOSIS — K50.819 CROHN'S DISEASE OF BOTH SMALL AND LARGE INTESTINE WITH COMPLICATION: Primary | ICD-10-CM

## 2024-08-29 NOTE — TELEPHONE ENCOUNTER
General surgery order pended with   Associated Dx: Crohn's disease of small intestine with other complication

## 2024-09-03 ENCOUNTER — PATIENT MESSAGE (OUTPATIENT)
Dept: REHABILITATION | Facility: HOSPITAL | Age: 44
End: 2024-09-03

## 2024-09-03 ENCOUNTER — PATIENT MESSAGE (OUTPATIENT)
Dept: WOUND CARE | Facility: CLINIC | Age: 44
End: 2024-09-03
Payer: COMMERCIAL

## 2024-09-04 DIAGNOSIS — F41.9 ANXIETY: ICD-10-CM

## 2024-09-04 DIAGNOSIS — K50.018 CROHN'S DISEASE OF SMALL INTESTINE WITH OTHER COMPLICATION: Primary | ICD-10-CM

## 2024-09-04 RX ORDER — ZOLPIDEM TARTRATE 10 MG/1
10 TABLET ORAL NIGHTLY
Qty: 30 TABLET | Refills: 2 | Status: SHIPPED | OUTPATIENT
Start: 2024-09-04

## 2024-09-04 RX ORDER — PROMETHAZINE HYDROCHLORIDE 25 MG/1
25 TABLET ORAL EVERY 6 HOURS PRN
Qty: 30 TABLET | Refills: 0 | Status: SHIPPED | OUTPATIENT
Start: 2024-09-04

## 2024-09-04 NOTE — TELEPHONE ENCOUNTER
Promethazine 25 mg PO q6h PRN nausea refill request  Per 8/13/24 OV, other medications: liquid imodium PRN; phenergan PRN for nausea ~2x/week   Allergies reviewed   Labs are up to date

## 2024-09-04 NOTE — TELEPHONE ENCOUNTER
A single episode is less likely to be serious.  A recurrence would be best evaluated in the emergency room.  I definitely agree on following up with her specialists.  
English

## 2024-09-04 NOTE — TELEPHONE ENCOUNTER
Refill Routing Note   Medication(s) are not appropriate for processing by [facility] for the following reason(s):        Outside of protocol    [facility] action(s):  Route             Appointments  past 12m or future 3m with PCP    Date Provider   Last Visit   2/2/2024 Luis Madden DO   Next Visit   Visit date not found Luis Madden, DO   ED visits in past 90 days: 0        Note composed:2:24 PM 09/04/2024

## 2024-09-04 NOTE — TELEPHONE ENCOUNTER
No care due was identified.  [facility] Embedded Care Due Messages. Reference number: [id number].   9/04/2024 7:46:53 AM CDT

## 2024-09-05 ENCOUNTER — CLINICAL SUPPORT (OUTPATIENT)
Dept: REHABILITATION | Facility: HOSPITAL | Age: 44
End: 2024-09-05
Payer: COMMERCIAL

## 2024-09-05 DIAGNOSIS — M25.612 DECREASED RANGE OF MOTION OF LEFT SHOULDER: Primary | ICD-10-CM

## 2024-09-05 DIAGNOSIS — R53.1 WEAKNESS: ICD-10-CM

## 2024-09-05 PROCEDURE — 97112 NEUROMUSCULAR REEDUCATION: CPT

## 2024-09-05 PROCEDURE — 97140 MANUAL THERAPY 1/> REGIONS: CPT

## 2024-09-05 PROCEDURE — 97530 THERAPEUTIC ACTIVITIES: CPT

## 2024-09-05 RX ORDER — DRONABINOL 10 MG/1
10 CAPSULE ORAL DAILY
Qty: 90 CAPSULE | Refills: 2 | Status: SHIPPED | OUTPATIENT
Start: 2024-09-05 | End: 2024-12-04

## 2024-09-05 NOTE — PROGRESS NOTES
[facility]   Physical Therapy Treatment Note     Name: Ivy Salgado  Clinic Number: [id number]    Therapy Diagnosis:   Encounter Diagnoses   Name Primary?    Decreased range of motion of left shoulder Yes    Weakness            Physician: Mono Giles III, *    Visit Date: 9/5/2024     Evaluation Date: 8/8/2023  Authorization Period Expiration: 7/6/2024  Plan of Care Expiration: 12/31/2023  Progress Note Due: 9/10/2023  Visit # / Visits authorized: 25/40  Foto: 3/3     Time In: 1500  Time Out: 1600  Total Billable Time: 60 minutes    DOS: 7/18/2023  S/p: left  1. Total shoulder arthroplasty (complex, -22 modifier)  2. Shoulder lysis of adhesions  3. Shoulder subpectoral biceps tenodesis (CPT 75341)  Post-Op Precautions: We will follow the shoulder arthroplasty guidelines. The patient remained in a sling for 6 weeks. We will start PT at the 3-week martina.       Precautions: none    SUBJECTIVE     Pt reports: overall doing OK, no pain just weakness, resolution of neural sx completely. Thinks she is ready for d/c     She was compliant with home exercise program.  Response to previous treatment: improvement in pain at rest   Functional change: able to ttol reaching overhead a bit better    Pain: 0/10  Location: left shoulder      OBJECTIVE     (+) radial ULNTT L with shoulder IR pre ; with shld extension post manual     U/E MMT Left Right   Shoulder Flexion 5/5 4+/5   Shoulder Extension 4+/5 4+/5   Shoulder Abduction 4+/5 4+/5   Shoulder IR 4+/5 5/5   Elbow Flexion  4+/5 5/5   Elbow Extension 4+/5 5/5     Shld Flexion L: 130 deg     Treatment       Extender used during treatment to A with care.     Ivy received the treatments listed below:      Neuromuscular re-education activities to improve: motor control for 28 min    1 rounds of each:    Radial N glides standing elbow only 20x    Wall UT level 1 shrugs 3x10    Thoracic rotations 15x B     Plank c alt shld taps 3 x 20   OH press 2#  4 x 8     Manual therapy techniques: Joint mobilizations were applied to the: L shoulder for 08 minutes, including:  Thoracic manip   ROM assessment     Therapeutic activities to improve functional performance for 24 minutes, including:  Bicep curls 3# 4 x 8   Triceps ext gtb  x 8   Shld flex to 90 2# 4 x 8   Shld ABD to 90 2# 3x10    Patient Education and Home Exercises     Home Exercises Provided and Patient Education Provided     Education provided:   - Add new exercises     Written Home Exercises Provided: yes. Exercises were reviewed and Ivy was able to demonstrate them prior to the end of the session.  Ivy demonstrated good  understanding of the education provided. See EMR under Patient Instructions for exercises provided during therapy sessions    ASSESSMENT     The patient has been in PT for the past 13 months due to set backs related to her PMH. The patient has shown significant compliance with HEP and has made significant improvements since her first visit. She is limited in her strength but due to HEP compliance and good understanding of HEP, safe to d/c to HEP at this time. Pt advised to return should she have a change in status and requires skilled PT to A with her progress.     Ivy Is progressing well towards her goals.     Pt prognosis is Fair.     Pt will continue to benefit from skilled outpatient physical therapy to address the deficits listed in the problem list box on initial evaluation, provide pt/family education and to maximize pt's level of independence in the home and community environment.     Pt's spiritual, cultural and educational needs considered and pt agreeable to plan of care and goals.     Anticipated barriers to physical therapy: none     Goals:   Short Term Goals: 12 weeks  1. Pt will be compliant with HEP 50% of prescribed amount. met  2. The pt to demo improvement in R shoulder PROM to by 80% of the L met  3.  The pt to demo tolerance to being out of the sling for 24  hours with pain <2/10 met     Long Term Goals:  36 weeks   Pt will be compliant with % of prescribed amount.  met  The pt to improvement in AROM of L shoulder within 80% of R shoulder to improve tolerance to OH movement  met  The pt to  Demo at least 4+/5 of RTC muscle testing to demo improvement in tolerance to activity  met  The pt to tolerate lifting 50# from the ground to waist height per job requirement description progressing, not met  The pt will report full participation in ADLs and IADLs without restrictions related to L Shoulder.  progressing, not met    PLAN     D/c to HEP     Dorothy Basurto, PT,

## 2024-09-06 ENCOUNTER — OFFICE VISIT (OUTPATIENT)
Dept: SURGERY | Facility: CLINIC | Age: 44
End: 2024-09-06
Payer: COMMERCIAL

## 2024-09-06 VITALS
HEART RATE: 65 BPM | OXYGEN SATURATION: 100 % | HEIGHT: 61 IN | DIASTOLIC BLOOD PRESSURE: 85 MMHG | WEIGHT: 110 LBS | SYSTOLIC BLOOD PRESSURE: 136 MMHG | BODY MASS INDEX: 20.77 KG/M2

## 2024-09-06 DIAGNOSIS — K50.819 CROHN'S DISEASE OF BOTH SMALL AND LARGE INTESTINE WITH COMPLICATION: ICD-10-CM

## 2024-09-06 DIAGNOSIS — Z95.828 PORT-A-CATH IN PLACE: Primary | ICD-10-CM

## 2024-09-06 PROCEDURE — 99999 PR PBB SHADOW E&M-EST. PATIENT-LVL V: CPT | Mod: PBBFAC,,, | Performed by: STUDENT IN AN ORGANIZED HEALTH CARE EDUCATION/TRAINING PROGRAM

## 2024-09-06 NOTE — PROGRESS NOTES
General Surgery Clinic  History and Physical    Subjective:     Ivy Salgado is a 44 y.o. female with hx of long QT (on Nadolol) and extensive surgical hx for Crohn's who presents to clinic for evaluation of left chest port removal and replacement due to current left IJ port not working. She uses the port for infusions for her crohn's. Had a prior left subclavian port that lasted 8 years and then had to be replaced by IR in July 2024. They accessed the left IJ and used the same port pocket but did not suture the port. She recently was unable to access the port for infusion and there's concern that the port has flipped. She would like to proceed with removal of left port and attempted placement of right subclavian.       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 with abdominal abscess in 1992, 1998 6/2012 (Dr Parsons):  Completion proctocolectomy with end ileostomy with pathology confirming transmural active inflammation with abscess c/w Crohn's disease    PMH:   Past Medical History:   Diagnosis Date    Abnormal Pap smear 2007    Alkaline phosphatase elevation- based on lab from 4/9/2019 05/28/2019    Arthritis     Avascular necrosis of bone of hip, left     Bacterial vaginosis     Crohn disease     Depression 08/05/2017    Drug-induced pancreatitis (imuran)     Encounter for blood transfusion     Generalized anxiety disorder     Genital HSV     GERD (gastroesophageal reflux disease)     Hypertension     Ileostomy in place 07/09/2012    Kidney stone     Long QT syndrome     Melanoma in situ (excised-buttocks 2018, para-stomal site 2019)     Moderate episode of recurrent major depressive disorder 02/02/2024    Multiple thyroid nodules 11/27/2018    Osteopenia of multiple sites 01/07/2019    Pancreas cyst (tail, 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)     Recurrent Clostridioides difficile diarrhea     Recurrent UTI 04/03/2013       Past Surgical History:   Past Surgical History:   Procedure Laterality Date     ABDOMINAL SURGERY      APPENDECTOMY      ARTHROPLASTY OF SHOULDER Left 7/18/2023    Procedure: ARTHROPLASTY, SHOULDER;  Surgeon: Sharon Babb MD;  Location: [facility] OR;  Service: Orthopedics;  Laterality: Left;    AUGMENTATION OF BREAST Bilateral 06/2022    gel implants    BILATERAL SALPINGO-OOPHORECTOMY (BSO) Bilateral 05/30/2019    Procedure: SALPINGO-OOPHORECTOMY, BILATERAL;  Surgeon: Rupa German MD;  Location: [facility] OR 2ND FLR;  Service: OB/GYN;  Laterality: Bilateral;    BLADDER SURGERY      partial cystectomy due to fistula    breast lift      BREAST SURGERY      [facility]      COLON SURGERY      COLONOSCOPY      CYSTOSCOPY  09/23/2020    Procedure: CYSTOSCOPY;  Surgeon: Sascha Florentino MD;  Location: [facility] OR 1ST FLR;  Service: Urology;;    CYSTOSCOPY W/ URETERAL STENT PLACEMENT Left 09/15/2020    Procedure: CYSTOSCOPY, WITH URETERAL STENT INSERTION;  Surgeon: Sascha Florentino MD;  Location: [facility] OR 1ST FLR;  Service: Urology;  Laterality: Left;    DIAGNOSTIC LAPAROSCOPY N/A 07/09/2020    Procedure: LAPAROSCOPY, DIAGNOSTIC;  Surgeon: Gilson El MD;  Location: [facility] OR;  Service: OB/GYN;  Laterality: N/A;    ESOPHAGOGASTRODUODENOSCOPY N/A 1/3/2024    Procedure: EGD (ESOPHAGOGASTRODUODENOSCOPY);  Surgeon: Lily Lind MD;  Location: [facility] (2ND FLR);  Service: Endoscopy;  Laterality: N/A;    EXCISION OF MELANOMA  07/17/2019    ILEOSCOPY N/A 1/3/2024    Procedure: ILEOSCOPY;  Surgeon: Lily Lind MD;  Location: [facility] (2ND FLR);  Service: Endoscopy;  Laterality: N/A;    ILEOSCOPY N/A 1/24/2024    Procedure: ILEOSCOPY;  Surgeon: Lilian Navarro MD;  Location: [facility] ENDO (2ND FLR);  Service: Endoscopy;  Laterality: N/A;  through stoma    ILEOSCOPY N/A 6/4/2024    Procedure: ILEOSCOPY;  Surgeon: Peace Garcai MD;  Location: [facility] ENDO (4TH FLR);  Service: Endoscopy;  Laterality: N/A;  Ref By:Dr.S.Garcia,instr sent via portal,AC  received urgent message to reschedule pt from 7/15  to may  or june-updated prep instructions sent-   5/29/24- precall complete - ERW    ILEOSTOMY      LAPAROSCOPIC LYSIS OF ADHESIONS N/A 07/09/2020    Procedure: LYSIS, ADHESIONS, LAPAROSCOPIC;  Surgeon: Gilson El MD;  Location: [facility];  Service: OB/GYN;  Laterality: N/A;    LASER LITHOTRIPSY  09/23/2020    Procedure: LITHOTRIPSY, USING LASER;  Surgeon: Sascha Florentino MD;  [facility];  Service: Urology;;    LYSIS OF ADHESIONS N/A 05/30/2019    Procedure: LYSIS, ADHESIONS;  Surgeon: Rupa German MD;  Location: [facility] OR 2ND FLR;  Service: OB/GYN;  Laterality: N/A;    OOPHORECTOMY Right 04/16/2015    PORTACATH PLACEMENT  02/21/2017    SKIN BIOPSY      SMALL INTESTINE SURGERY      age 18 Y    TOTAL ABDOMINAL HYSTERECTOMY  04/16/2015    TOTAL COLECTOMY      TUBAL LIGATION  06/06/2012    UPPER GASTROINTESTINAL ENDOSCOPY      URETEROSCOPIC REMOVAL OF URETERIC CALCULUS  09/23/2020    Procedure: REMOVAL, CALCULUS, URETER, URETEROSCOPIC;  Surgeon: Sascha Florentino MD;  Location: [facility] OR [address] [place] [address];  Service: Urology;;    URETEROSCOPY Left 09/23/2020    Procedure: URETEROSCOPY;  Surgeon: Sascha Florentino MD;  Location: [facility] OR [address] [place] [address];  Service: Urology;  Laterality: Left;       Social History:  Social History     Socioeconomic History    Marital status: Single   Occupational History     Employer: [employer]   Tobacco Use    Smoking status: Never    Smokeless tobacco: Never   Substance and Sexual Activity    Alcohol use: Not Currently     Alcohol/week: 0.0 standard drinks of alcohol    Drug use: No    Sexual activity: Yes     Partners: Male     Birth control/protection: See Surgical Hx     Comment: HYST   Other Topics Concern    Are you pregnant or think you may be? No    Breast-feeding No     Social Determinants of Health     Financial Resource Strain: Low Risk  (4/18/2024)    Overall Financial Resource Strain (CARDIA)     Difficulty of Paying Living Expenses: Not hard at all    Food Insecurity: No Food Insecurity (4/18/2024)    Hunger Vital Sign     Worried About Running Out of Food in the Last Year: Never true     Ran Out of Food in the Last Year: Never true   Transportation Needs: No Transportation Needs (4/18/2024)    PRAPARE - Transportation     Lack of Transportation (Medical): No     Lack of Transportation (Non-Medical): No   Physical Activity: Insufficiently Active (1/17/2024)    Exercise Vital Sign     Days of Exercise per Week: 3 days     Minutes of Exercise per Session: 30 min   Stress: No Stress Concern Present (4/18/2024)    [employer] - Occupational Stress Questionnaire     Feeling of Stress : Not at all   Housing Stability: Low Risk  (4/18/2024)    Housing Stability Vital Sign     Unable to Pay for Housing in the Last Year: No     Homeless in the Last Year: No       Allergies:   Review of patient's allergies indicates:   Allergen Reactions    Azathioprine sodium Other (See Comments)     Other reaction(s): pancreatitis  Other reaction(s): pancreatitis    Methotrexate analogues Other (See Comments)     leukopenia    Stelara [ustekinumab] Other (See Comments)     Multiple infections    Zofran [ondansetron hcl (pf)] Other (See Comments)     Per patient causes prolong QT    Vancomycin analogues Other (See Comments)     Made her red    Azathioprine      Other reaction(s): Unknown    Methotrexate      Other reaction(s): infection-    Morphine Itching and Other (See Comments)     Other reaction(s): Itching    Zofran [ondansetron hcl]      Other reaction(s): Hives    Bactrim [sulfamethoxazole-trimethoprim] Palpitations    Ciprofloxacin Palpitations       Medications:    Current Outpatient Medications on File Prior to Visit   Medication Sig Dispense Refill    amoxicillin (AMOXIL) 500 MG capsule Take 1 capsule (500 mg total) by mouth 3 (three) times daily for 7 to 10 days. 30 capsule 0    chlorhexidine (PERIDEX) 0.12 % solution Use 1 capful by mouth twice  daily. 473 mL 0    clonazePAM (KLONOPIN) 1 MG tablet Take 1 tablet (1 mg total) by mouth 2 (two) times daily. 60 tablet 2    cyclobenzaprine (FLEXERIL) 10 MG tablet Take 1 tablet (10 mg total) by mouth every evening as needed for muscle pain 30 tablet 2    droNABinol (MARINOL) 10 MG capsule Take 1 capsule (10 mg total) by mouth once daily. 90 capsule 2    gabapentin (NEURONTIN) 300 MG capsule Take 1 capsule (300 mg total) by mouth 2 (two) times daily. 60 capsule 5    HYDROcodone-acetaminophen (NORCO) 5-325 mg per tablet Take 1 tablet by mouth every 6 (six) hours as needed for pain. 8 tablet 0    ibuprofen (ADVIL,MOTRIN) 800 MG tablet Take 1 tablet (800 mg total) by mouth every 6 to 8 hours as needed for pain. 20 tablet 0    loperamide (IMODIUM) 1 mg/7.5 mL solution Take 4 mg by mouth 3 (three) times daily as needed for Diarrhea.      nadoloL (CORGARD) 40 MG tablet Take 1 tablet (40 mg total) by mouth once daily. Patient needs appointment before next refill. 90 tablet 7    pantoprazole (PROTONIX) 40 MG tablet Take 1 tablet (40 mg total) by mouth 2 (two) times daily. 60 tablet 12    promethazine (PHENERGAN) 25 MG tablet Take 1 tablet (25 mg total) by mouth every 6 (six) hours as needed for Nausea. 30 tablet 0    valACYclovir (VALTREX) 500 MG tablet Take 1 tablet (500 mg total) by mouth once daily. 90 tablet 3    vedolizumab (ENTYVIO) 300 mg SolR injection Inject 300 mg into the vein every 8 weeks.      zolpidem (AMBIEN) 10 mg Tab Take 1 tablet (10 mg total) by mouth every evening. 30 tablet 2    cephALEXin (KEFLEX) 500 MG capsule Take 500 mg by mouth every 12 (twelve) hours. (Patient not taking: Reported on 8/13/2024)      diphenoxylate-atropine 2.5-0.025 mg/5 ml (LOMOTIL) 2.5-0.025 mg/5 mL liquid Take 2.5-5 ml up to 4 times a day - dose might change at visit on 1/30 (Patient not taking: Reported on 8/13/2024) 300 mL 0    estradiol 0.025 mg/24 hr 0.025 mg/24 hr Place 1 patch onto the skin once a week. (Patient taking  differently: Place 1 patch onto the skin once a week. CHANGES ON MONDAYS) 4 patch 11    methenamine (HIPREX) 1 gram Tab Take 1 tablet (1 g total) by mouth 2 (two) times daily. (Patient not taking: Reported on 8/13/2024) 60 tablet 1    mirtazapine (REMERON SOL-TAB) 30 MG disintegrating tablet Take 1 tablet (30 mg total) by mouth nightly. 90 tablet 2    multivitamin (THERAGRAN) per tablet Take 1 tablet by mouth once daily.      tamsulosin (FLOMAX) 0.4 mg Cap Take 1 capsule (0.4 mg total) by mouth once daily. (Patient not taking: Reported on 4/15/2024) 30 capsule 1     No current facility-administered medications on file prior to visit.         Objective:     PHYSICAL EXAM:  Vital Signs (Most Recent)  Pulse: 65 (09/06/24 1437)  BP: 136/85 (09/06/24 1437)  SpO2: 100 % (09/06/24 1437)    Review of Systems   Constitutional:  Negative for chills and fever.    A 10+ review of systems was performed with pertinent positives and negatives noted above in the history of present illness.  Other systems were negative unless otherwise specified.    Physical Exam:  Physical Exam  Vitals reviewed.   Constitutional:       General: She is not in acute distress.     Appearance: She is not ill-appearing.   Neck:      Comments: Right IJ port in place. No signs of infection. Unable to discern if port is flipped   Cardiovascular:      Rate and Rhythm: Normal rate.   Pulmonary:      Effort: Pulmonary effort is normal. No respiratory distress.   Skin:     General: Skin is warm and dry.   Neurological:      General: No focal deficit present.      Mental Status: She is alert.         Labs:  I have reviewed all pertinent lab results within the past 24 hours.    Imaging  Reviewed       Assessment:     44 y.o. female with non-functioning left IJ port used for crohn's disease infusions. Had a prior L subclavian port that lasted 8 years. Recently replaced by IR in July 2024. Concern port has flipped given unable to access and use at recent  infusion    Plan:     - will proceed with removal of left IJ port and right subclavian port placement  - discussed with patient that we will attempt the right subclavian but if unable to access the vein, we would then need to proceed with right IJ port. She understands and is in agreement  - This procedure has been fully reviewed with the patient and written informed consent has been obtained        Milagros Gallagher MD   [facility], PGY3

## 2024-09-09 ENCOUNTER — ANESTHESIA EVENT (OUTPATIENT)
Dept: SURGERY | Facility: HOSPITAL | Age: 44
End: 2024-09-09
Payer: COMMERCIAL

## 2024-09-09 ENCOUNTER — TELEPHONE (OUTPATIENT)
Dept: SURGERY | Facility: CLINIC | Age: 44
End: 2024-09-09
Payer: COMMERCIAL

## 2024-09-09 NOTE — ANESTHESIA PREPROCEDURE EVALUATION
[facility]  Anesthesia Pre-Operative Evaluation         Patient Name: Ivy Salgado  YOB: 1980  MRN: [id number]    SUBJECTIVE:     Pre-operative evaluation for Procedure(s) (LRB):  INSERTION, SINGLE LUMEN CATHETER WITH PORT, WITH FLUOROSCOPIC GUIDANCE, Rt neck or chest, prefers chest (Right)  REMOVAL, CATHETER, CENTRAL VENOUS, TUNNELED, WITH PORT, Left side (Left)     09/09/2024    Ivy Salgado is a 44 y.o. female w/ a significant PMHx of ARPITA/Depression, GERD, Long QTc syndrome (Type III, on nadolol), s/p SHELLIE (2015) for recurrent ovarian cysts and endometriosis, Crohn's disease with small and large intestine involvement, s/p total proctocolectomy with end ileostomy (6/2012).    Port in place for infusions for Crohn's. Issues with recently placed left port.    Patient now presents for the above procedure(s).      LDA:        PowerPort A Cath Single Lumen 07/23/24 0823 Internal Jugular Left (Active)   Number of days: 48            Ileostomy 06/16/12 0000 RLQ (Active)   Number of days: 4468       Prev airway:   Date/Time: 7/18/2023 7:14 AM  Performed by: Jeannette Orozco CRNA  Authorized by: Duncan Clark MD      Intubation:     Induction:  Intravenous    Intubated:  Postinduction    Mask Ventilation:  Easy mask    Attempts:  1    Attempted By:  CRNA ([facility])    Method of Intubation:  Video laryngoscopy    Blade:  Dunbar 3    Laryngeal View Grade: Grade I - full view of cords      Difficult Airway Encountered?: No      Complications:  None    Airway Device:  Oral endotracheal tube    Airway Device Size:  7.0    Style/Cuff Inflation:  Cuffed    Inflation Amount (mL):  4    Tube secured:  20    Secured at:  The lips    Placement Verified By:  Capnometry    Complicating Factors:  None    Findings Post-Intubation:  BS equal bilateral and atraumatic/condition of teeth unchanged       Drips: None documented.    Echo:  3/12/2020  Normal left ventricular systolic function. The estimated  ejection fraction is 63%.  No wall motion abnormalities.  Normal LV diastolic function.  Normal right ventricular systolic function.  Normal central venous pressure (3 mmHg).       Patient Active Problem List   Diagnosis    Ileostomy in place    Crohn's disease of ileum with end ileostomy    Generalized anxiety disorder    Genital HSV    Joint pain    Hypertension    High output ileostomy    Vitamin D deficiency    Multiple thyroid nodules    Osteopenia of multiple sites    GERD (gastroesophageal reflux disease)    Alkaline phosphatase elevation- based on lab from 4/9/2019     History of recurrent UTI (urinary tract infection)    Pelvic pain in female    Ureteral stone    History of prolonged Q-T interval on ECG    Biceps tendonitis on left    Decreased range of motion of left shoulder    Moderate episode of recurrent major depressive disorder    Recurrent Clostridioides difficile diarrhea    Kidney stone    Melanoma in situ (excised-buttocks 2018, para-stomal site 2019)    Long QT syndrome    Pancreas cyst (tail, 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)    Bacterial vaginosis    Drug-induced pancreatitis (imuran)    Avascular necrosis of bone of hip, left    Dysuria       Review of patient's allergies indicates:   Allergen Reactions    Azathioprine sodium Other (See Comments)     Other reaction(s): pancreatitis  Other reaction(s): pancreatitis    Methotrexate analogues Other (See Comments)     leukopenia    Stelara [ustekinumab] Other (See Comments)     Multiple infections    Zofran [ondansetron hcl (pf)] Other (See Comments)     Per patient causes prolong QT    Vancomycin analogues Other (See Comments)     Made her red    Azathioprine      Other reaction(s): Unknown    Methotrexate      Other reaction(s): infection-    Morphine Itching and Other (See Comments)     Other reaction(s): Itching    Zofran [ondansetron hcl]      Other reaction(s): Hives    Bactrim [sulfamethoxazole-trimethoprim] Palpitations    Ciprofloxacin Palpitations        Current Outpatient Medications:  No current facility-administered medications for this encounter.    Current Outpatient Medications:     amoxicillin (AMOXIL) 500 MG capsule, Take 1 capsule (500 mg total) by mouth 3 (three) times daily for 7 to 10 days., Disp: 30 capsule, Rfl: 0    cephALEXin (KEFLEX) 500 MG capsule, Take 500 mg by mouth every 12 (twelve) hours. (Patient not taking: Reported on 8/13/2024), Disp: , Rfl:     chlorhexidine (PERIDEX) 0.12 % solution, Use 1 capful by mouth twice daily., Disp: 473 mL, Rfl: 0    clonazePAM (KLONOPIN) 1 MG tablet, Take 1 tablet (1 mg total) by mouth 2 (two) times daily., Disp: 60 tablet, Rfl: 2    cyclobenzaprine (FLEXERIL) 10 MG tablet, Take 1 tablet (10 mg total) by mouth every evening as needed for muscle pain, Disp: 30 tablet, Rfl: 2    diphenoxylate-atropine 2.5-0.025 mg/5 ml (LOMOTIL) 2.5-0.025 mg/5 mL liquid, Take 2.5-5 ml up to 4 times a day - dose might change at visit on 1/30 (Patient not taking: Reported on 8/13/2024), Disp: 300 mL, Rfl: 0    droNABinol (MARINOL) 10 MG capsule, Take 1 capsule (10 mg total) by mouth once daily., Disp: 90 capsule, Rfl: 2    estradiol 0.025 mg/24 hr 0.025 mg/24 hr, Place 1 patch onto the skin once a week. (Patient taking differently: Place 1 patch onto the skin once a week. CHANGES ON MONDAYS), Disp: 4 patch, Rfl: 11    gabapentin (NEURONTIN) 300 MG capsule, Take 1 capsule (300 mg total) by mouth 2 (two) times daily., Disp: 60 capsule, Rfl: 5    HYDROcodone-acetaminophen (NORCO) 5-325 mg per tablet, Take 1 tablet by mouth every 6 (six) hours as needed for pain., Disp: 8 tablet, Rfl: 0    ibuprofen (ADVIL,MOTRIN) 800 MG tablet, Take 1 tablet (800 mg total) by mouth every 6 to 8 hours as needed for pain., Disp: 20 tablet, Rfl: 0    loperamide (IMODIUM) 1 mg/7.5 mL solution, Take 4 mg by mouth 3 (three) times daily as needed for Diarrhea., Disp: , Rfl:     methenamine (HIPREX) 1 gram Tab, Take 1 tablet (1 g total) by mouth 2 (two)  times daily. (Patient not taking: Reported on 8/13/2024), Disp: 60 tablet, Rfl: 1    mirtazapine (REMERON SOL-TAB) 30 MG disintegrating tablet, Take 1 tablet (30 mg total) by mouth nightly., Disp: 90 tablet, Rfl: 2    multivitamin (THERAGRAN) per tablet, Take 1 tablet by mouth once daily., Disp: , Rfl:     nadoloL (CORGARD) 40 MG tablet, Take 1 tablet (40 mg total) by mouth once daily. Patient needs appointment before next refill., Disp: 90 tablet, Rfl: 7    pantoprazole (PROTONIX) 40 MG tablet, Take 1 tablet (40 mg total) by mouth 2 (two) times daily., Disp: 60 tablet, Rfl: 12    promethazine (PHENERGAN) 25 MG tablet, Take 1 tablet (25 mg total) by mouth every 6 (six) hours as needed for Nausea., Disp: 30 tablet, Rfl: 0    tamsulosin (FLOMAX) 0.4 mg Cap, Take 1 capsule (0.4 mg total) by mouth once daily. (Patient not taking: Reported on 4/15/2024), Disp: 30 capsule, Rfl: 1    valACYclovir (VALTREX) 500 MG tablet, Take 1 tablet (500 mg total) by mouth once daily., Disp: 90 tablet, Rfl: 3    vedolizumab (ENTYVIO) 300 mg SolR injection, Inject 300 mg into the vein every 8 weeks., Disp: , Rfl:     zolpidem (AMBIEN) 10 mg Tab, Take 1 tablet (10 mg total) by mouth every evening., Disp: 30 tablet, Rfl: 2    Past Surgical History:   Procedure Laterality Date    ABDOMINAL SURGERY      APPENDECTOMY      ARTHROPLASTY OF SHOULDER Left 7/18/2023    Procedure: ARTHROPLASTY, SHOULDER;  Surgeon: Sharon Babb MD;  Location: [facility] OR;  Service: Orthopedics;  Laterality: Left;    AUGMENTATION OF BREAST Bilateral 06/2022    gel implants    BILATERAL SALPINGO-OOPHORECTOMY (BSO) Bilateral 05/30/2019    Procedure: SALPINGO-OOPHORECTOMY, BILATERAL;  Surgeon: Rupa German MD;  Location: [address];  Service: OB/GYN;  Laterality: Bilateral;    BLADDER SURGERY      partial cystectomy due to fistula    breast lift      BREAST SURGERY      [facility]      COLON SURGERY      COLONOSCOPY      CYSTOSCOPY  09/23/2020    Procedure: CYSTOSCOPY;   Surgeon: Sascha Florentino MD;  Location: [facility] OR 1ST FLR;  Service: Urology;;    CYSTOSCOPY W/ URETERAL STENT PLACEMENT Left 09/15/2020    Procedure: CYSTOSCOPY, WITH URETERAL STENT INSERTION;  Surgeon: Sascha Florentino MD;  Location: [facility] OR 1ST FLR;  Service: Urology;  Laterality: Left;    DIAGNOSTIC LAPAROSCOPY N/A 07/09/2020    Procedure: LAPAROSCOPY, DIAGNOSTIC;  Surgeon: Gilson El MD;  Location: [facility] OR;  Service: OB/GYN;  Laterality: N/A;    ESOPHAGOGASTRODUODENOSCOPY N/A 1/3/2024    Procedure: EGD (ESOPHAGOGASTRODUODENOSCOPY);  Surgeon: Lily Lind MD;  Location: [facility] ENDO (2ND FLR);  Service: Endoscopy;  Laterality: N/A;    EXCISION OF MELANOMA  07/17/2019    ILEOSCOPY N/A 1/3/2024    Procedure: ILEOSCOPY;  Surgeon: Lily Lind MD;  Location: [facility] ENDO (2ND FLR);  Service: Endoscopy;  Laterality: N/A;    ILEOSCOPY N/A 1/24/2024    Procedure: ILEOSCOPY;  Surgeon: Lilian Navarro MD;  Location: [facility] ENDO (2ND FLR);  Service: Endoscopy;  Laterality: N/A;  through stoma    ILEOSCOPY N/A 6/4/2024    Procedure: ILEOSCOPY;  Surgeon: Peace Garcia MD;  Location: [facility] ENDO (4TH FLR);  Service: Endoscopy;  Laterality: N/A;  Ref By:,kaleb sent via portal,  received urgent message to reschedule pt from 7/15  to may or june-updated prep instructions sent-   5/29/24- precall complete - ERW    ILEOSTOMY      LAPAROSCOPIC LYSIS OF ADHESIONS N/A 07/09/2020    Procedure: LYSIS, ADHESIONS, LAPAROSCOPIC;  Surgeon: Gilson El MD;  Location: [facility] OR;  Service: OB/GYN;  Laterality: N/A;    LASER LITHOTRIPSY  09/23/2020    Procedure: LITHOTRIPSY, USING LASER;  Surgeon: Sascha Florentino MD;  Location: [facility] OR 1ST FLR;  Service: Urology;;    LYSIS OF ADHESIONS N/A 05/30/2019    Procedure: LYSIS, ADHESIONS;  Surgeon: Rupa German MD;  Location: [facility] OR [address] [place] [address];  Service: OB/GYN;  Laterality: N/A;    OOPHORECTOMY Right 04/16/2015    PORTACATH PLACEMENT  02/21/2017    SKIN  BIOPSY      SMALL INTESTINE SURGERY      age 18 Y    TOTAL ABDOMINAL HYSTERECTOMY  04/16/2015    TOTAL COLECTOMY      TUBAL LIGATION  06/06/2012    UPPER GASTROINTESTINAL ENDOSCOPY      URETEROSCOPIC REMOVAL OF URETERIC CALCULUS  09/23/2020    Procedure: REMOVAL, CALCULUS, URETER, URETEROSCOPIC;  Surgeon: Sascha Florentino MD;  Location: [address];  Service: Urology;;    URETEROSCOPY Left 09/23/2020    Procedure: URETEROSCOPY;  Surgeon: Sascha Florentino MD;  Location: [facility] OR [address] [place] [address];  Service: Urology;  Laterality: Left;       Social History     Socioeconomic History    Marital status: Single   Occupational History     Employer: [employer]   Tobacco Use    Smoking status: Never    Smokeless tobacco: Never   Substance and Sexual Activity    Alcohol use: Not Currently     Alcohol/week: 0.0 standard drinks of alcohol    Drug use: No    Sexual activity: Yes     Partners: Male     Birth control/protection: See Surgical Hx     Comment: HYST   Other Topics Concern    Are you pregnant or think you may be? No    Breast-feeding No     Social Determinants of Health     Financial Resource Strain: Low Risk  (4/18/2024)    Overall Financial Resource Strain (CARDIA)     Difficulty of Paying Living Expenses: Not hard at all   Food Insecurity: No Food Insecurity (4/18/2024)    Hunger Vital Sign     Worried About Running Out of Food in the Last Year: Never true     Ran Out of Food in the Last Year: Never true   Transportation Needs: No Transportation Needs (4/18/2024)    PRAPARE - Transportation     Lack of Transportation (Medical): No     Lack of Transportation (Non-Medical): No   Physical Activity: Insufficiently Active (1/17/2024)    Exercise Vital Sign     Days of Exercise per Week: 3 days     Minutes of Exercise per Session: 30 min   Stress: No Stress Concern Present (4/18/2024)    [employer] - Occupational Stress Questionnaire     Feeling of Stress : Not at all    Housing Stability: Low Risk  (4/18/2024)    Housing Stability Vital Sign     Unable to Pay for Housing in the Last Year: No     Homeless in the Last Year: No       OBJECTIVE:     Vital Signs Range (Last 24H):         Significant Labs:  Lab Results   Component Value Date    WBC 5.64 07/02/2024    HGB 13.1 07/02/2024    HCT 41.2 07/02/2024     07/02/2024    CHOL 192 01/02/2024    TRIG 150 01/02/2024    HDL 40 01/02/2024    ALT 14 07/02/2024    AST 25 07/02/2024     07/02/2024    K 3.5 07/02/2024     07/02/2024    CREATININE 0.8 07/02/2024    BUN 7 07/02/2024    CO2 20 (L) 07/02/2024    TSH 2.386 01/02/2024    INR 1.0 07/16/2024    HGBA1C 5.1 01/01/2024       Diagnostic Studies: No relevant studies.    EKG:   Results for orders placed or performed during the hospital encounter of 05/08/24   EKG 12-lead    Collection Time: 05/08/24 11:59 AM   Result Value Ref Range    QRS Duration 80 ms    [facility] QTC Calculation 443 ms    Narrative    Test Reason : I45.81,    Vent. Rate : 077 BPM     Atrial Rate : 077 BPM     P-R Int : 120 ms          QRS Dur : 080 ms      QT Int : 392 ms       P-R-T Axes : 065 058 053 degrees     QTc Int : 443 ms    Normal sinus rhythm  Normal ECG  When compared with ECG of 15-APR-2024 08:20,  No significant change was found  Confirmed by Pranay WHITFIELD MD ([id number]) on 5/8/2024 5:02:39 PM    Referred By: SUN WADE           Confirmed By:Pranay WHITFIELD MD       2D ECHO:  TTE:  No results found. However, due to the size of the patient record, not all encounters were searched. Please check Results Review for a complete set of results.    MARCIE:  No results found. However, due to the size of the patient record, not all encounters were searched. Please check Results Review for a complete set of results.    ASSESSMENT/PLAN:       Pre-op Assessment    I have reviewed the Patient Summary Reports.     I have reviewed the Nursing Notes. I have reviewed the NPO Status.   I have reviewed the Medications.      Review of Systems  Anesthesia Hx:  No problems with previous Anesthesia             Denies Family Hx of Anesthesia complications.    Denies Personal Hx of Anesthesia complications.                    Hematology/Oncology:  Hematology Normal   Oncology Normal                                   EENT/Dental:  EENT/Dental Normal           Cardiovascular:     Hypertension                disease) Long QT syndrome                         Pulmonary:  Pulmonary Normal                       Renal/:   renal calculi               Hepatic/GI:     GERD   Crohn's          Musculoskeletal:  Musculoskeletal Normal                Neurological:  Neurology Normal                                      Endocrine:  Endocrine Normal            Psych:   anxiety depression                Physical Exam  General: Well nourished, Cooperative, Alert and Oriented    Airway:  Mallampati: II   Mouth Opening: Normal  TM Distance: Normal  Tongue: Normal  Neck ROM: Normal ROM        Anesthesia Plan  Type of Anesthesia, risks & benefits discussed:    Anesthesia Type: Gen ETT, MAC, Gen Natural Airway  Intra-op Monitoring Plan: Standard ASA Monitors  Post Op Pain Control Plan: multimodal analgesia and IV/PO Opioids PRN  Induction:  IV  Airway Plan: Direct and Video, Post-Induction  Informed Consent: Informed consent signed with the Patient and all parties understand the risks and agree with anesthesia plan.  All questions answered.   ASA Score: 2  Day of Surgery Review of History & Physical: H&P Update referred to the surgeon/provider.    Ready For Surgery From Anesthesia Perspective.     .

## 2024-09-10 ENCOUNTER — ANESTHESIA (OUTPATIENT)
Dept: SURGERY | Facility: HOSPITAL | Age: 44
End: 2024-09-10
Payer: COMMERCIAL

## 2024-09-10 ENCOUNTER — HOSPITAL ENCOUNTER (OUTPATIENT)
Facility: HOSPITAL | Age: 44
Discharge: HOME | End: 2024-09-10
Attending: STUDENT IN AN ORGANIZED HEALTH CARE EDUCATION/TRAINING PROGRAM | Admitting: STUDENT IN AN ORGANIZED HEALTH CARE EDUCATION/TRAINING PROGRAM
Payer: COMMERCIAL

## 2024-09-10 VITALS
HEIGHT: 61 IN | SYSTOLIC BLOOD PRESSURE: 130 MMHG | RESPIRATION RATE: 18 BRPM | DIASTOLIC BLOOD PRESSURE: 79 MMHG | TEMPERATURE: 98 F | HEART RATE: 66 BPM | OXYGEN SATURATION: 98 % | BODY MASS INDEX: 20.77 KG/M2 | WEIGHT: 110 LBS

## 2024-09-10 DIAGNOSIS — K50.018 CROHN'S DISEASE OF SMALL INTESTINE WITH OTHER COMPLICATION: Primary | ICD-10-CM

## 2024-09-10 DIAGNOSIS — Z95.828 PORT-A-CATH IN PLACE: ICD-10-CM

## 2024-09-10 PROCEDURE — 63600175 PHARM REV CODE 636 W HCPCS: Mod: JZ,JG | Performed by: STUDENT IN AN ORGANIZED HEALTH CARE EDUCATION/TRAINING PROGRAM

## 2024-09-10 PROCEDURE — 71000044 HC DOSC ROUTINE RECOVERY FIRST HOUR: Performed by: STUDENT IN AN ORGANIZED HEALTH CARE EDUCATION/TRAINING PROGRAM

## 2024-09-10 PROCEDURE — 25000003 PHARM REV CODE 250: Performed by: STUDENT IN AN ORGANIZED HEALTH CARE EDUCATION/TRAINING PROGRAM

## 2024-09-10 PROCEDURE — 25000003 PHARM REV CODE 250

## 2024-09-10 PROCEDURE — 36000707: Performed by: STUDENT IN AN ORGANIZED HEALTH CARE EDUCATION/TRAINING PROGRAM

## 2024-09-10 PROCEDURE — 63600175 PHARM REV CODE 636 W HCPCS: Performed by: NURSE ANESTHETIST, CERTIFIED REGISTERED

## 2024-09-10 PROCEDURE — C1788 PORT, INDWELLING, IMP: HCPCS | Performed by: STUDENT IN AN ORGANIZED HEALTH CARE EDUCATION/TRAINING PROGRAM

## 2024-09-10 PROCEDURE — 71000015 HC POSTOP RECOV 1ST HR: Performed by: STUDENT IN AN ORGANIZED HEALTH CARE EDUCATION/TRAINING PROGRAM

## 2024-09-10 PROCEDURE — 63600175 PHARM REV CODE 636 W HCPCS: Performed by: STUDENT IN AN ORGANIZED HEALTH CARE EDUCATION/TRAINING PROGRAM

## 2024-09-10 PROCEDURE — 36590 REMOVAL TUNNELED CV CATH: CPT | Mod: 51,,, | Performed by: STUDENT IN AN ORGANIZED HEALTH CARE EDUCATION/TRAINING PROGRAM

## 2024-09-10 PROCEDURE — 36561 INSERT TUNNELED CV CATH: CPT | Mod: RT,,, | Performed by: STUDENT IN AN ORGANIZED HEALTH CARE EDUCATION/TRAINING PROGRAM

## 2024-09-10 PROCEDURE — 36000706: Performed by: STUDENT IN AN ORGANIZED HEALTH CARE EDUCATION/TRAINING PROGRAM

## 2024-09-10 PROCEDURE — 63600175 PHARM REV CODE 636 W HCPCS

## 2024-09-10 PROCEDURE — 77001 FLUOROGUIDE FOR VEIN DEVICE: CPT | Mod: 26,,, | Performed by: STUDENT IN AN ORGANIZED HEALTH CARE EDUCATION/TRAINING PROGRAM

## 2024-09-10 PROCEDURE — 88300 SURGICAL PATH GROSS: CPT | Performed by: STUDENT IN AN ORGANIZED HEALTH CARE EDUCATION/TRAINING PROGRAM

## 2024-09-10 PROCEDURE — 37000008 HC ANESTHESIA 1ST 15 MINUTES: Performed by: STUDENT IN AN ORGANIZED HEALTH CARE EDUCATION/TRAINING PROGRAM

## 2024-09-10 PROCEDURE — 63600175 PHARM REV CODE 636 W HCPCS: Performed by: ANESTHESIOLOGY

## 2024-09-10 PROCEDURE — 25000003 PHARM REV CODE 250: Performed by: NURSE ANESTHETIST, CERTIFIED REGISTERED

## 2024-09-10 PROCEDURE — 37000009 HC ANESTHESIA EA ADD 15 MINS: Performed by: STUDENT IN AN ORGANIZED HEALTH CARE EDUCATION/TRAINING PROGRAM

## 2024-09-10 DEVICE — POWERPORT M.R.I. IMPLANTABLE PORT WITH ATTACHABLE 8F CHRONOFLEX OPEN-ENDED SINGLE-LUMEN VENOUS CATHETER INTERMEDIATE KIT  (WITH SUTURE PLUGS)
Type: IMPLANTABLE DEVICE | Site: SUBCLAVIAN | Status: FUNCTIONAL
Brand: POWERPORT M.R.I., CHRONOFLEX

## 2024-09-10 RX ORDER — PROCHLORPERAZINE EDISYLATE 5 MG/ML
5 INJECTION INTRAMUSCULAR; INTRAVENOUS EVERY 30 MIN PRN
Status: DISCONTINUED | OUTPATIENT
Start: 2024-09-10 | End: 2024-09-10 | Stop reason: HOSPADM

## 2024-09-10 RX ORDER — DEXAMETHASONE SODIUM PHOSPHATE 4 MG/ML
INJECTION, SOLUTION INTRA-ARTICULAR; INTRALESIONAL; INTRAMUSCULAR; INTRAVENOUS; SOFT TISSUE
Status: DISCONTINUED | OUTPATIENT
Start: 2024-09-10 | End: 2024-09-10

## 2024-09-10 RX ORDER — HYDROMORPHONE HYDROCHLORIDE 1 MG/ML
1 INJECTION, SOLUTION INTRAMUSCULAR; INTRAVENOUS; SUBCUTANEOUS EVERY 4 HOURS PRN
Status: DISCONTINUED | OUTPATIENT
Start: 2024-09-10 | End: 2024-09-10 | Stop reason: HOSPADM

## 2024-09-10 RX ORDER — SODIUM CHLORIDE 9 MG/ML
INJECTION, SOLUTION INTRAVENOUS CONTINUOUS
Status: DISCONTINUED | OUTPATIENT
Start: 2024-09-10 | End: 2024-09-10 | Stop reason: HOSPADM

## 2024-09-10 RX ORDER — HEPARIN 100 UNIT/ML
SYRINGE INTRAVENOUS
Status: DISCONTINUED | OUTPATIENT
Start: 2024-09-10 | End: 2024-09-10 | Stop reason: HOSPADM

## 2024-09-10 RX ORDER — OXYCODONE HYDROCHLORIDE 5 MG/1
5 TABLET ORAL EVERY 4 HOURS PRN
Status: DISCONTINUED | OUTPATIENT
Start: 2024-09-10 | End: 2024-09-10 | Stop reason: HOSPADM

## 2024-09-10 RX ORDER — GLUCAGON 1 MG
1 KIT INJECTION
Status: DISCONTINUED | OUTPATIENT
Start: 2024-09-10 | End: 2024-09-10 | Stop reason: HOSPADM

## 2024-09-10 RX ORDER — BUPIVACAINE HYDROCHLORIDE 2.5 MG/ML
INJECTION, SOLUTION EPIDURAL; INFILTRATION; INTRACAUDAL
Status: DISCONTINUED | OUTPATIENT
Start: 2024-09-10 | End: 2024-09-10 | Stop reason: HOSPADM

## 2024-09-10 RX ORDER — PROMETHAZINE HYDROCHLORIDE 25 MG/1
25 TABLET ORAL EVERY 6 HOURS PRN
Status: DISCONTINUED | OUTPATIENT
Start: 2024-09-10 | End: 2024-09-10 | Stop reason: HOSPADM

## 2024-09-10 RX ORDER — MIDAZOLAM HYDROCHLORIDE 1 MG/ML
INJECTION INTRAMUSCULAR; INTRAVENOUS
Status: DISCONTINUED | OUTPATIENT
Start: 2024-09-10 | End: 2024-09-10

## 2024-09-10 RX ORDER — FENTANYL CITRATE 50 UG/ML
INJECTION, SOLUTION INTRAMUSCULAR; INTRAVENOUS
Status: DISCONTINUED | OUTPATIENT
Start: 2024-09-10 | End: 2024-09-10

## 2024-09-10 RX ORDER — LIDOCAINE HYDROCHLORIDE 20 MG/ML
INJECTION, SOLUTION EPIDURAL; INFILTRATION; INTRACAUDAL; PERINEURAL
Status: DISCONTINUED | OUTPATIENT
Start: 2024-09-10 | End: 2024-09-10

## 2024-09-10 RX ORDER — ACETAMINOPHEN 325 MG/1
650 TABLET ORAL EVERY 4 HOURS PRN
Status: DISCONTINUED | OUTPATIENT
Start: 2024-09-10 | End: 2024-09-10 | Stop reason: HOSPADM

## 2024-09-10 RX ORDER — FENTANYL CITRATE 50 UG/ML
25 INJECTION, SOLUTION INTRAMUSCULAR; INTRAVENOUS EVERY 5 MIN PRN
Status: COMPLETED | OUTPATIENT
Start: 2024-09-10 | End: 2024-09-10

## 2024-09-10 RX ORDER — PROPOFOL 10 MG/ML
VIAL (ML) INTRAVENOUS
Status: DISCONTINUED | OUTPATIENT
Start: 2024-09-10 | End: 2024-09-10

## 2024-09-10 RX ORDER — OXYCODONE HYDROCHLORIDE 5 MG/1
5 CAPSULE ORAL EVERY 4 HOURS PRN
Qty: 5 CAPSULE | Refills: 0 | Status: SHIPPED | OUTPATIENT
Start: 2024-09-10

## 2024-09-10 RX ADMIN — FENTANYL CITRATE 100 MCG: 50 INJECTION, SOLUTION INTRAMUSCULAR; INTRAVENOUS at 05:09

## 2024-09-10 RX ADMIN — MIDAZOLAM HYDROCHLORIDE 2 MG: 2 INJECTION, SOLUTION INTRAMUSCULAR; INTRAVENOUS at 05:09

## 2024-09-10 RX ADMIN — FENTANYL CITRATE 25 MCG: 50 INJECTION, SOLUTION INTRAMUSCULAR; INTRAVENOUS at 07:09

## 2024-09-10 RX ADMIN — OXYCODONE HYDROCHLORIDE 5 MG: 5 TABLET ORAL at 08:09

## 2024-09-10 RX ADMIN — GLYCOPYRROLATE 0.2 MG: 0.2 INJECTION, SOLUTION INTRAMUSCULAR; INTRAVENOUS at 05:09

## 2024-09-10 RX ADMIN — PROPOFOL 40 MG: 10 INJECTION, EMULSION INTRAVENOUS at 06:09

## 2024-09-10 RX ADMIN — DEXAMETHASONE SODIUM PHOSPHATE 4 MG: 4 INJECTION, SOLUTION INTRAMUSCULAR; INTRAVENOUS at 06:09

## 2024-09-10 RX ADMIN — PROPOFOL 140 MG: 10 INJECTION, EMULSION INTRAVENOUS at 06:09

## 2024-09-10 RX ADMIN — HYDROMORPHONE HYDROCHLORIDE 1 MG: 1 INJECTION, SOLUTION INTRAMUSCULAR; INTRAVENOUS; SUBCUTANEOUS at 08:09

## 2024-09-10 RX ADMIN — LIDOCAINE HYDROCHLORIDE 100 MG: 20 INJECTION, SOLUTION EPIDURAL; INFILTRATION; INTRACAUDAL; PERINEURAL at 06:09

## 2024-09-10 RX ADMIN — SODIUM CHLORIDE: 0.9 INJECTION, SOLUTION INTRAVENOUS at 05:09

## 2024-09-10 RX ADMIN — CEFAZOLIN 2 G: 2 INJECTION, POWDER, FOR SOLUTION INTRAMUSCULAR; INTRAVENOUS at 06:09

## 2024-09-10 NOTE — H&P
General Surgery Clinic  History and Physical     Subjective:      Ivy Salgado is a 44 y.o. female with hx of long QT (on Nadolol) and extensive surgical hx for Crohn's who presents to clinic for evaluation of left chest port removal and replacement due to current left IJ port not working. She uses the port for infusions for her crohn's. Had a prior left subclavian port that lasted 8 years and then had to be replaced by IR in July 2024. They accessed the left IJ and used the same port pocket but did not suture the port. She recently was unable to access the port for infusion and there's concern that the port has flipped. She would like to proceed with removal of left port and attempted placement of right subclavian.         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 with abdominal abscess in 1992, 1998 6/2012 (Dr Parsons):  Completion proctocolectomy with end ileostomy with pathology confirming transmural active inflammation with abscess c/w Crohn's disease     PMH:        Past Medical History:   Diagnosis Date    Abnormal Pap smear 2007    Alkaline phosphatase elevation- based on lab from 4/9/2019 05/28/2019    Arthritis      Avascular necrosis of bone of hip, left      Bacterial vaginosis      Crohn disease      Depression 08/05/2017    Drug-induced pancreatitis (imuran)      Encounter for blood transfusion      Generalized anxiety disorder      Genital HSV      GERD (gastroesophageal reflux disease)      Hypertension      Ileostomy in place 07/09/2012    Kidney stone      Long QT syndrome      Melanoma in situ (excised-buttocks 2018, para-stomal site 2019)      Moderate episode of recurrent major depressive disorder 02/02/2024    Multiple thyroid nodules 11/27/2018    Osteopenia of multiple sites 01/07/2019    Pancreas cyst (tail, 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)      Recurrent Clostridioides difficile diarrhea      Recurrent UTI 04/03/2013         Past Surgical History:         Past Surgical  History:   Procedure Laterality Date    ABDOMINAL SURGERY        APPENDECTOMY        ARTHROPLASTY OF SHOULDER Left 7/18/2023     Procedure: ARTHROPLASTY, SHOULDER;  Surgeon: Sharon Babb MD;  Location: [facility] OR;  Service: Orthopedics;  Laterality: Left;    AUGMENTATION OF BREAST Bilateral 06/2022     gel implants    BILATERAL SALPINGO-OOPHORECTOMY (BSO) Bilateral 05/30/2019     Procedure: SALPINGO-OOPHORECTOMY, BILATERAL;  Surgeon: Rupa German MD;  Location: [facility] OR 2ND FLR;  Service: OB/GYN;  Laterality: Bilateral;    BLADDER SURGERY         partial cystectomy due to fistula    breast lift        BREAST SURGERY        [facility]        COLON SURGERY        COLONOSCOPY        CYSTOSCOPY   09/23/2020     Procedure: CYSTOSCOPY;  Surgeon: Sascha Florentino MD;  [facility];  Service: Urology;;    CYSTOSCOPY W/ URETERAL STENT PLACEMENT Left 09/15/2020     Procedure: CYSTOSCOPY, WITH URETERAL STENT INSERTION;  Surgeon: Sascha Florentino MD;  [facility];  Service: Urology;  Laterality: Left;    DIAGNOSTIC LAPAROSCOPY N/A 07/09/2020     Procedure: LAPAROSCOPY, DIAGNOSTIC;  Surgeon: Gilson El MD;  Location: [facility];  Service: OB/GYN;  Laterality: N/A;    ESOPHAGOGASTRODUODENOSCOPY N/A 1/3/2024     Procedure: EGD (ESOPHAGOGASTRODUODENOSCOPY);  Surgeon: Lily Lind MD;  Location: [facility] (2ND FLR);  Service: Endoscopy;  Laterality: N/A;    EXCISION OF MELANOMA   07/17/2019    ILEOSCOPY N/A 1/3/2024     Procedure: ILEOSCOPY;  Surgeon: Lily Lind MD;  Location: [facility] (2ND FLR);  Service: Endoscopy;  Laterality: N/A;    ILEOSCOPY N/A 1/24/2024     Procedure: ILEOSCOPY;  Surgeon: Lilian Navarro MD;  Location: [facility] ENDO (2ND FLR);  Service: Endoscopy;  Laterality: N/A;  through stoma    ILEOSCOPY N/A 6/4/2024     Procedure: ILEOSCOPY;  Surgeon: Peace Garcia MD;  Location: [facility] ENDO (4TH FLR);  Service: Endoscopy;  Laterality: N/A;  Ref By:,instr sent via  BG ayala  received urgent message to reschedule pt from 7/15  to may or june-updated prep instructions sent-   5/29/24- precall complete - ERW    ILEOSTOMY        LAPAROSCOPIC LYSIS OF ADHESIONS N/A 07/09/2020     Procedure: LYSIS, ADHESIONS, LAPAROSCOPIC;  Surgeon: Gilson El MD;  Location: [facility] OR;  Service: OB/GYN;  Laterality: N/A;    LASER LITHOTRIPSY   09/23/2020     Procedure: LITHOTRIPSY, USING LASER;  Surgeon: Sascha Florentino MD;  Location: [facility] OR [address] [place] [address];  Service: Urology;;    LYSIS OF ADHESIONS N/A 05/30/2019     Procedure: LYSIS, ADHESIONS;  Surgeon: Rupa German MD;  Location: [facility] OR 2ND FLR;  Service: OB/GYN;  Laterality: N/A;    OOPHORECTOMY Right 04/16/2015    PORTACATH PLACEMENT   02/21/2017    SKIN BIOPSY        SMALL INTESTINE SURGERY         age 18 Y    TOTAL ABDOMINAL HYSTERECTOMY   04/16/2015    TOTAL COLECTOMY        TUBAL LIGATION   06/06/2012    UPPER GASTROINTESTINAL ENDOSCOPY        URETEROSCOPIC REMOVAL OF URETERIC CALCULUS   09/23/2020     Procedure: REMOVAL, CALCULUS, URETER, URETEROSCOPIC;  Surgeon: Sascha Florentino MD;  Location: [facility] OR [address] [place] [address];  Service: Urology;;    URETEROSCOPY Left 09/23/2020     Procedure: URETEROSCOPY;  Surgeon: Sascha Florentino MD;  Location: [facility] OR [address] [place] [address];  Service: Urology;  Laterality: Left;         Social History:  Social History            Socioeconomic History    Marital status: Single   Occupational History       Employer: [employer]   Tobacco Use    Smoking status: Never    Smokeless tobacco: Never   Substance and Sexual Activity    Alcohol use: Not Currently       Alcohol/week: 0.0 standard drinks of alcohol    Drug use: No    Sexual activity: Yes       Partners: Male       Birth control/protection: See Surgical Hx       Comment: HYST   Other Topics Concern    Are you pregnant or think you may be? No    Breast-feeding No      Social Determinants of Health           Financial Resource Strain: Low Risk   (4/18/2024)     Overall Financial Resource Strain (CARDIA)      Difficulty of Paying Living Expenses: Not hard at all   Food Insecurity: No Food Insecurity (4/18/2024)     Hunger Vital Sign      Worried About Running Out of Food in the Last Year: Never true      Ran Out of Food in the Last Year: Never true   Transportation Needs: No Transportation Needs (4/18/2024)     PRAPARE - Transportation      Lack of Transportation (Medical): No      Lack of Transportation (Non-Medical): No   Physical Activity: Insufficiently Active (1/17/2024)     Exercise Vital Sign      Days of Exercise per Week: 3 days      Minutes of Exercise per Session: 30 min   Stress: No Stress Concern Present (4/18/2024)     [employer] [employer] - Occupational Stress Questionnaire      Feeling of Stress : Not at all   Housing Stability: Low Risk  (4/18/2024)     Housing Stability Vital Sign      Unable to Pay for Housing in the Last Year: No      Homeless in the Last Year: No         Allergies:         Review of patient's allergies indicates:   Allergen Reactions    Azathioprine sodium Other (See Comments)       Other reaction(s): pancreatitis  Other reaction(s): pancreatitis    Methotrexate analogues Other (See Comments)       leukopenia    Stelara [ustekinumab] Other (See Comments)       Multiple infections    Zofran [ondansetron hcl (pf)] Other (See Comments)       Per patient causes prolong QT    Vancomycin analogues Other (See Comments)       Made her red    Azathioprine         Other reaction(s): Unknown    Methotrexate         Other reaction(s): infection-    Morphine Itching and Other (See Comments)       Other reaction(s): Itching    Zofran [ondansetron hcl]         Other reaction(s): Hives    Bactrim [sulfamethoxazole-trimethoprim] Palpitations    Ciprofloxacin Palpitations         Medications:     Medications Ordered Prior to Encounter          Current Outpatient Medications on File Prior to Visit   Medication Sig  Dispense Refill    amoxicillin (AMOXIL) 500 MG capsule Take 1 capsule (500 mg total) by mouth 3 (three) times daily for 7 to 10 days. 30 capsule 0    chlorhexidine (PERIDEX) 0.12 % solution Use 1 capful by mouth twice daily. 473 mL 0    clonazePAM (KLONOPIN) 1 MG tablet Take 1 tablet (1 mg total) by mouth 2 (two) times daily. 60 tablet 2    cyclobenzaprine (FLEXERIL) 10 MG tablet Take 1 tablet (10 mg total) by mouth every evening as needed for muscle pain 30 tablet 2    droNABinol (MARINOL) 10 MG capsule Take 1 capsule (10 mg total) by mouth once daily. 90 capsule 2    gabapentin (NEURONTIN) 300 MG capsule Take 1 capsule (300 mg total) by mouth 2 (two) times daily. 60 capsule 5    HYDROcodone-acetaminophen (NORCO) 5-325 mg per tablet Take 1 tablet by mouth every 6 (six) hours as needed for pain. 8 tablet 0    ibuprofen (ADVIL,MOTRIN) 800 MG tablet Take 1 tablet (800 mg total) by mouth every 6 to 8 hours as needed for pain. 20 tablet 0    loperamide (IMODIUM) 1 mg/7.5 mL solution Take 4 mg by mouth 3 (three) times daily as needed for Diarrhea.        nadoloL (CORGARD) 40 MG tablet Take 1 tablet (40 mg total) by mouth once daily. Patient needs appointment before next refill. 90 tablet 7    pantoprazole (PROTONIX) 40 MG tablet Take 1 tablet (40 mg total) by mouth 2 (two) times daily. 60 tablet 12    promethazine (PHENERGAN) 25 MG tablet Take 1 tablet (25 mg total) by mouth every 6 (six) hours as needed for Nausea. 30 tablet 0    valACYclovir (VALTREX) 500 MG tablet Take 1 tablet (500 mg total) by mouth once daily. 90 tablet 3    vedolizumab (ENTYVIO) 300 mg SolR injection Inject 300 mg into the vein every 8 weeks.        zolpidem (AMBIEN) 10 mg Tab Take 1 tablet (10 mg total) by mouth every evening. 30 tablet 2    cephALEXin (KEFLEX) 500 MG capsule Take 500 mg by mouth every 12 (twelve) hours. (Patient not taking: Reported on 8/13/2024)        diphenoxylate-atropine 2.5-0.025 mg/5 ml (LOMOTIL) 2.5-0.025 mg/5 mL liquid  Take 2.5-5 ml up to 4 times a day - dose might change at visit on 1/30 (Patient not taking: Reported on 8/13/2024) 300 mL 0    estradiol 0.025 mg/24 hr 0.025 mg/24 hr Place 1 patch onto the skin once a week. (Patient taking differently: Place 1 patch onto the skin once a week. CHANGES ON MONDAYS) 4 patch 11    methenamine (HIPREX) 1 gram Tab Take 1 tablet (1 g total) by mouth 2 (two) times daily. (Patient not taking: Reported on 8/13/2024) 60 tablet 1    mirtazapine (REMERON SOL-TAB) 30 MG disintegrating tablet Take 1 tablet (30 mg total) by mouth nightly. 90 tablet 2    multivitamin (THERAGRAN) per tablet Take 1 tablet by mouth once daily.        tamsulosin (FLOMAX) 0.4 mg Cap Take 1 capsule (0.4 mg total) by mouth once daily. (Patient not taking: Reported on 4/15/2024) 30 capsule 1      No current facility-administered medications on file prior to visit.               Objective:      PHYSICAL EXAM:  Vital Signs (Most Recent)  Pulse: 65 (09/06/24 1437)  BP: 136/85 (09/06/24 1437)  SpO2: 100 % (09/06/24 1437)     Review of Systems   Constitutional:  Negative for chills and fever.    A 10+ review of systems was performed with pertinent positives and negatives noted above in the history of present illness.  Other systems were negative unless otherwise specified.     Physical Exam:  Physical Exam  Vitals reviewed.   Constitutional:       General: She is not in acute distress.     Appearance: She is not ill-appearing.   Neck:      Comments: Right IJ port in place. No signs of infection. Unable to discern if port is flipped   Cardiovascular:      Rate and Rhythm: Normal rate.   Pulmonary:      Effort: Pulmonary effort is normal. No respiratory distress.   Skin:     General: Skin is warm and dry.   Neurological:      General: No focal deficit present.      Mental Status: She is alert.            Labs:  I have reviewed all pertinent lab results within the past 24 hours.     Imaging  Reviewed         Assessment:      44  y.o. female with non-functioning left IJ port used for crohn's disease infusions. Had a prior L subclavian port that lasted 8 years. Recently replaced by IR in July 2024. Concern port has flipped given unable to access and use at recent infusion     Plan:      - will proceed with removal of left IJ port and right subclavian port placement  - discussed with patient that we will attempt the right subclavian but if unable to access the vein, we would then need to proceed with right IJ port. She understands and is in agreement  - This procedure has been fully reviewed with the patient and written informed consent has been obtained        Issac Cobos MD  [facility] PGY5

## 2024-09-11 PROBLEM — R53.1 WEAKNESS: Status: ACTIVE | Noted: 2024-09-11

## 2024-09-11 NOTE — OP NOTE
[facility] - Surgery ([facility])  Surgery Department  Operative Note    SUMMARY     Date of Procedure: 9/10/2024     Procedure: Procedure(s) (LRB):  INSERTION, SINGLE LUMEN CATHETER WITH PORT, WITH FLUOROSCOPIC GUIDANCE, Rt neck or chest, prefers chest (Right)  REMOVAL, CATHETER, CENTRAL VENOUS, TUNNELED, WITH PORT, Left side (Left)     Surgeons and Role:     * Vinh Lloyd MD - Primary     * Nasrin Wing MD - Resident - Assisting     * Issac Cobos MD - Fellow        Pre-Operative Diagnosis: Port-A-Cath in place [Z95.828]    Post-Operative Diagnosis: Post-Op Diagnosis Codes:     * Port-A-Cath in place [Z95.828]    Anesthesia: Choice    Estimated Blood Loss (EBL): * No values recorded between 9/10/2024  6:11 PM and 9/10/2024  7:08 PM *           Operative Findings:  Placement of R. Sided subclavian port-a-cath and removal of L. Sided IJ port-a-cath with no apparent complications.    Indications for Procedure:  Infusions for Crohn's disease.    Procedure in Detail: After informed consent was obtained, the patent was taken to the operating room, placed supine on the operating table, and administered general endotracheal anesthesia. The patient was also administered the appropriate pre-operative antibiotics, had SCDs in place, and then was prepped/draped in the usual sterile fashion.  We first approached the right chest where the delto-pectoral groove was anesthetized with a lidocaine/marcaine mixture.  The subclavian vein was cannulated and a Seldinger wire advanced.  Fluoroscopy confirmed wire position in the Superior Vena Cava.  The port pocket was created by anesthetizing the skin and a skin incision made with a 15 blade.  A combination of blunt and cautery dissection were used to create the port pocket.  A pre-flushed port was then placed in the pocket and secured in place with interrupted 3-0 Vicryl sutures.  The catheter was then tunneled to the percutaneous access site.  The dilator/sheath was then advanced  over the wire under fluoroscopic guidance into the SVC.  The catheter was then advanced through the sheath after the stylet and wire were removed.  The peel-away sheath was then removed leaving the catheter in place.  Fluoroscopy was used to confirm proper placement of the catheter.  The port was then aspirated and flushed easily with heparinized saline.  The pocket was then closed with interrupted 3-0 Vicryl sutures in a deep dermal fashion followed by a running subcuticular Monocryl suture.  The access site was also closed with a Monocryl suture.  The final heparin lock was then instilled. The left sided port-a-cath pocket was opened sharply with a 15 blade and hemostasis achieved using cautery. The port was removed along with the catheter and the skin closed in the same fashion as above. Final heparin lock for the right sided subclavian port-a-cath was instilled and the procedure terminated.   Pt tolerated the procedure well and was transferred to the recovery room in stable condition.  The lap and instrument counts were correct at the end of the procedure.  A portable chest Xray was obtained at the termination of the procedure.      Implants:   Implant Name Type Inv. Item Serial No.  Lot No. LRB No. Used Action   KIT POWERPORT SINGLE 8FR - [id number]  KIT POWERPORT SINGLE 8FR  [employer] [id number] Right 1 Implanted       Specimens:   Specimen (24h ago, onward)       Start     Ordered    09/10/24 1836  Specimen to Pathology, Surgery General Surgery  Once        Comments: Pre-op Diagnosis: Port-A-Cath in place [Z95.828]Procedure(s):INSERTION, SINGLE LUMEN CATHETER WITH PORT, WITH FLUOROSCOPIC GUIDANCE, Rt neck or chest, prefers chestREMOVAL, CATHETER, CENTRAL VENOUS, TUNNELED, WITH PORT, Left side Number of specimens: 1Name of specimens: 1. PORT - GROSS     References:    Click here for ordering Quick Tip   Question Answer Comment   Procedure Type: General Surgery    Specimen Class: Routine/Screening     Release to patient Immediate        09/10/24 0033

## 2024-09-11 NOTE — ANESTHESIA POSTPROCEDURE EVALUATION
Anesthesia Post Evaluation    Patient: Ivy Salgado    Procedure(s) Performed: Procedure(s) (LRB):  INSERTION, SINGLE LUMEN CATHETER WITH PORT, WITH FLUOROSCOPIC GUIDANCE, Rt neck or chest, prefers chest (Right)  REMOVAL, CATHETER, CENTRAL VENOUS, TUNNELED, WITH PORT, Left side (Left)    Final Anesthesia Type: general      Patient location during evaluation: PACU  Patient participation: Yes- Able to Participate  Level of consciousness: awake  Post-procedure vital signs: reviewed and stable  Pain management: adequate  Airway patency: patent    PONV status at discharge: No PONV  Anesthetic complications: no      Cardiovascular status: blood pressure returned to baseline  Respiratory status: unassisted  Hydration status: euvolemic  Follow-up not needed.              Vitals Value Taken Time   /79 09/10/24 2030   Temp 36.6 °C (97.9 °F) 09/10/24 2030   Pulse 66 09/10/24 2030   Resp 18 09/10/24 2030   SpO2 98 % 09/10/24 2030         No case tracking events are documented in the log.      Pain/Michael Score: Pain Rating Prior to Med Admin: 7 (9/10/2024  8:16 PM)  Michael Score: 9 (9/10/2024  8:00 PM)

## 2024-09-11 NOTE — DISCHARGE INSTRUCTIONS
You had a subclavian port placement and internal jugular port removal performed.     Please alternate tylenol and ibuprofen for pain as directed by the bottle. You have also been prescribed a narcotic pain medication to help with post-operative pain.   Please make sure to take 1 capful of Miralax per day to help with narcotic associated constipation.     Please do not drive while on narcotic pain medication.    You have skin glue (dermabond) over your incision. Skin glue will fall off on its own  You may shower and let soapy water run over your incision, do not scrub. Please no baths or soaking for 2 weeks.    Michael no heavy lifting/pushing/pulling. Do not lift anything heavier than a gallon of milk (about 10-15 lbs) for the next 4-6 weeks.    You have no diet restrictions.

## 2024-09-11 NOTE — DISCHARGE SUMMARY
[facility] - Surgery (2nd Fl)  Discharge Note  Short Stay    Procedure(s) (LRB):  INSERTION, SINGLE LUMEN CATHETER WITH PORT, WITH FLUOROSCOPIC GUIDANCE, Rt neck or chest, prefers chest (Right)  REMOVAL, CATHETER, CENTRAL VENOUS, TUNNELED, WITH PORT, Left side (Left)      OUTCOME: Patient tolerated treatment/procedure well without complication and is now ready for discharge.    DISPOSITION: Home or Self Care    FINAL DIAGNOSIS:  <principal problem not specified>    FOLLOWUP: None    DISCHARGE INSTRUCTIONS:    Please alternate tylenol and ibuprofen for pain as directed by the bottle. You have also been prescribed a narcotic pain medication to help with post-operative pain.   Please make sure to take 1 capful of Miralax per day to help with narcotic associated constipation.      Please do not drive while on narcotic pain medication.     You have skin glue (dermabond) over your incision. Skin glue will fall off on its own  You may shower and let soapy water run over your incision, do not scrub. Please no baths or soaking for 2 weeks.     Michael no heavy lifting/pushing/pulling. Do not lift anything heavier than a gallon of milk (about 10-15 lbs) for the next 4-6 weeks.     You have no diet restrictions.  Discharge Procedure Orders   Diet general     Call MD for:  temperature >100.4     Call MD for:  persistent nausea and vomiting     Call MD for:  severe uncontrolled pain     Call MD for:  difficulty breathing, headache or visual disturbances     Call MD for:  redness, tenderness, or signs of infection (pain, swelling, redness, odor or green/yellow discharge around incision site)     Call MD for:  hives     Call MD for:  persistent dizziness or light-headedness     Call MD for:  extreme fatigue     No dressing needed        TIME SPENT ON DISCHARGE: 10 minutes

## 2024-09-12 ENCOUNTER — TELEPHONE (OUTPATIENT)
Dept: GASTROENTEROLOGY | Facility: CLINIC | Age: 44
End: 2024-09-12
Payer: COMMERCIAL

## 2024-09-12 LAB
FINAL PATHOLOGIC DIAGNOSIS: NORMAL
GROSS: NORMAL
Lab: NORMAL

## 2024-09-12 NOTE — TELEPHONE ENCOUNTER
"- Received message from [facility] pharmacy that Lomotil oral liquid is on backorder.  Pharmacy dispensed partial fill (60 mL) to patient.  - Called patient to clarify if she is taking Lomotil. Most recent Rx was written 3/13/24.    - Dr Garcia's note indicates " She is not taking lomotil due to palpitations" and pt did not report taking Lomotil at [facility] in 8/2024.   - called patient to clarify- Lvm. Portal message sent.  "

## 2024-09-13 ENCOUNTER — TELEPHONE (OUTPATIENT)
Dept: UROLOGY | Facility: CLINIC | Age: 44
End: 2024-09-13

## 2024-09-13 ENCOUNTER — OFFICE VISIT (OUTPATIENT)
Dept: UROLOGY | Facility: CLINIC | Age: 44
End: 2024-09-13
Payer: COMMERCIAL

## 2024-09-13 ENCOUNTER — PATIENT MESSAGE (OUTPATIENT)
Dept: UROLOGY | Facility: CLINIC | Age: 44
End: 2024-09-13

## 2024-09-13 VITALS
DIASTOLIC BLOOD PRESSURE: 70 MMHG | HEIGHT: 61 IN | SYSTOLIC BLOOD PRESSURE: 100 MMHG | WEIGHT: 110 LBS | BODY MASS INDEX: 20.77 KG/M2 | HEART RATE: 75 BPM

## 2024-09-13 DIAGNOSIS — N39.0 RECURRENT UTI: ICD-10-CM

## 2024-09-13 PROCEDURE — 99999 PR PBB SHADOW E&M-EST. PATIENT-LVL IV: CPT | Mod: PBBFAC,,, | Performed by: UROLOGY

## 2024-09-13 NOTE — PROGRESS NOTES
The patient had to leave before being seen.  She has a UA today with the following results     1.025, pH 5.5, 0.2 urobilinogen, otherwise, negative.     She has a virtual visit on Monday

## 2024-09-26 DIAGNOSIS — R10.9 FLANK PAIN: ICD-10-CM

## 2024-09-26 RX ORDER — TAMSULOSIN HYDROCHLORIDE 0.4 MG/1
0.4 CAPSULE ORAL DAILY
Qty: 30 CAPSULE | Refills: 1 | Status: SHIPPED | OUTPATIENT
Start: 2024-09-26 | End: 2024-11-25

## 2024-09-26 RX ORDER — VALACYCLOVIR HYDROCHLORIDE 500 MG/1
500 TABLET, FILM COATED ORAL DAILY
Qty: 90 TABLET | Refills: 1 | Status: SHIPPED | OUTPATIENT
Start: 2024-09-26

## 2024-09-26 NOTE — TELEPHONE ENCOUNTER
Refill Encounter    PCP Visits: Recent Visits  Date Type Provider Dept   02/02/24 Office Visit Luis Madden DO [facility] Internal Medicine   Showing recent visits within past 360 days and meeting all other requirements  Future Appointments  No visits were found meeting these conditions.  Showing future appointments within next 720 days and meeting all other requirements     Last 3 Blood Pressure:   BP Readings from Last 3 Encounters:   09/13/24 100/70   09/10/24 130/79   09/06/24 136/85     Preferred Pharmacy:   [facility] [address] [place] [address]  Phone: [phone] Fax: [phone]    Requested RX:  Requested Prescriptions     Pending Prescriptions Disp Refills    tamsulosin (FLOMAX) 0.4 mg Cap 30 capsule 1     Sig: Take 1 capsule (0.4 mg total) by mouth once daily.      RX Route: Normal

## 2024-09-26 NOTE — TELEPHONE ENCOUNTER
No care due was identified.  [facility] Embedded Care Due Messages. Reference number: [id number].   9/26/2024 3:39:08 PM CDT

## 2024-09-26 NOTE — TELEPHONE ENCOUNTER
Refill Decision Note   Ivy Salgado  is requesting a refill authorization.  Brief Assessment and Rationale for Refill:  Approve     Medication Therapy Plan:        Comments:     Note composed:4:58 PM 09/26/2024

## 2024-09-30 DIAGNOSIS — K50.018 CROHN'S DISEASE OF SMALL INTESTINE WITH OTHER COMPLICATION: Primary | ICD-10-CM

## 2024-09-30 RX ORDER — GABAPENTIN 300 MG/1
300 CAPSULE ORAL 2 TIMES DAILY
Qty: 60 CAPSULE | Refills: 4 | Status: SHIPPED | OUTPATIENT
Start: 2024-09-30

## 2024-10-03 ENCOUNTER — PATIENT MESSAGE (OUTPATIENT)
Dept: GASTROENTEROLOGY | Facility: CLINIC | Age: 44
End: 2024-10-03
Payer: COMMERCIAL

## 2024-10-08 ENCOUNTER — PATIENT MESSAGE (OUTPATIENT)
Dept: GASTROENTEROLOGY | Facility: CLINIC | Age: 44
End: 2024-10-08
Payer: COMMERCIAL

## 2024-10-08 ENCOUNTER — TELEPHONE (OUTPATIENT)
Dept: GASTROENTEROLOGY | Facility: CLINIC | Age: 44
End: 2024-10-08
Payer: COMMERCIAL

## 2024-10-08 NOTE — TELEPHONE ENCOUNTER
----- Message from Gill sent at 10/8/2024  3:47 PM CDT -----  Contact: pt @[phone]  Ivy Salgado calling regarding Patient Advice (message) for #pt is calling to speak with nurse concerning message on portal, asking for call back

## 2024-10-08 NOTE — TELEPHONE ENCOUNTER
Per PM:  CD of Ileum with end ileostomy    IBD meds:  - Entyvio q8w, LD: 8/27, ND: 10/24    N/V, abd/stomach pains, onset 4 days    Fever: max 100.1  Nausea: yes  Vomiting:        # emesis per day: about 12  # times emptying ostomy bag per day: 6-8        - how full before emptying: about half   # times emptying ostomy bag when feeling well: 6-10 depending on how much and what I eat        - how full before emptying: half  consistency/color: watery to applesauce   is blood present: no  Is mucous present: no  Pain - rate on scale of 0-10 (0 = no pain, 10 = worst imaginable pain): 8         - Location of Pain: across abdomen        - Type of Pain: sharp stabbing cramping         - Anything make it better or worse: heating pad. Took one pain pill I had left Sunday 11. Any close contacts with similar issues: no      Slightly dizzy/lightheaded but not terrible.     Dr. Garcia will be updated.

## 2024-10-09 ENCOUNTER — HOSPITAL ENCOUNTER (EMERGENCY)
Facility: HOSPITAL | Age: 44
Discharge: HOME | End: 2024-10-09
Attending: EMERGENCY MEDICINE
Payer: COMMERCIAL

## 2024-10-09 VITALS
BODY MASS INDEX: 20.77 KG/M2 | TEMPERATURE: 98 F | WEIGHT: 110 LBS | DIASTOLIC BLOOD PRESSURE: 73 MMHG | OXYGEN SATURATION: 98 % | SYSTOLIC BLOOD PRESSURE: 112 MMHG | HEIGHT: 61 IN | HEART RATE: 81 BPM | RESPIRATION RATE: 17 BRPM

## 2024-10-09 DIAGNOSIS — I45.81 PROLONGED QT INTERVAL SYNDROME: ICD-10-CM

## 2024-10-09 DIAGNOSIS — R11.2 NAUSEA AND VOMITING, UNSPECIFIED VOMITING TYPE: Primary | ICD-10-CM

## 2024-10-09 LAB
ALBUMIN SERPL BCP-MCNC: 4.1 G/DL (ref 3.5–5.2)
ALP SERPL-CCNC: 126 U/L (ref 55–135)
ALT SERPL W/O P-5'-P-CCNC: 12 U/L (ref 10–44)
ANION GAP SERPL CALC-SCNC: 11 MMOL/L (ref 8–16)
AST SERPL-CCNC: 20 U/L (ref 10–40)
BASOPHILS # BLD AUTO: 0.04 K/UL (ref 0–0.2)
BASOPHILS NFR BLD: 0.5 % (ref 0–1.9)
BILIRUB SERPL-MCNC: 0.4 MG/DL (ref 0.1–1)
BILIRUB UR QL STRIP: NEGATIVE
BUN SERPL-MCNC: 11 MG/DL (ref 6–20)
BUN SERPL-MCNC: 12 MG/DL (ref 6–30)
CALCIUM SERPL-MCNC: 9.9 MG/DL (ref 8.7–10.5)
CHLORIDE SERPL-SCNC: 106 MMOL/L (ref 95–110)
CHLORIDE SERPL-SCNC: 107 MMOL/L (ref 95–110)
CLARITY UR REFRACT.AUTO: CLEAR
CO2 SERPL-SCNC: 21 MMOL/L (ref 23–29)
COLOR UR AUTO: YELLOW
CREAT SERPL-MCNC: 0.7 MG/DL (ref 0.5–1.4)
CREAT SERPL-MCNC: 0.8 MG/DL (ref 0.5–1.4)
DIFFERENTIAL METHOD BLD: NORMAL
EOSINOPHIL # BLD AUTO: 0.1 K/UL (ref 0–0.5)
EOSINOPHIL NFR BLD: 0.8 % (ref 0–8)
ERYTHROCYTE [DISTWIDTH] IN BLOOD BY AUTOMATED COUNT: 14.1 % (ref 11.5–14.5)
EST. GFR  (NO RACE VARIABLE): >60 ML/MIN/1.73 M^2
GLUCOSE SERPL-MCNC: 114 MG/DL (ref 70–110)
GLUCOSE SERPL-MCNC: 119 MG/DL (ref 70–110)
GLUCOSE UR QL STRIP: NEGATIVE
HCT VFR BLD AUTO: 38.9 % (ref 37–48.5)
HCT VFR BLD CALC: 41 %PCV (ref 36–54)
HGB BLD-MCNC: 13.3 G/DL (ref 12–16)
HGB UR QL STRIP: NEGATIVE
IMM GRANULOCYTES # BLD AUTO: 0.01 K/UL (ref 0–0.04)
IMM GRANULOCYTES NFR BLD AUTO: 0.1 % (ref 0–0.5)
KETONES UR QL STRIP: NEGATIVE
LEUKOCYTE ESTERASE UR QL STRIP: NEGATIVE
LIPASE SERPL-CCNC: 36 U/L (ref 4–60)
LYMPHOCYTES # BLD AUTO: 1.8 K/UL (ref 1–4.8)
LYMPHOCYTES NFR BLD: 23.2 % (ref 18–48)
MAGNESIUM SERPL-MCNC: 1.9 MG/DL (ref 1.6–2.6)
MCH RBC QN AUTO: 28.6 PG (ref 27–31)
MCHC RBC AUTO-ENTMCNC: 34.2 G/DL (ref 32–36)
MCV RBC AUTO: 84 FL (ref 82–98)
MONOCYTES # BLD AUTO: 0.4 K/UL (ref 0.3–1)
MONOCYTES NFR BLD: 5.5 % (ref 4–15)
NEUTROPHILS # BLD AUTO: 5.6 K/UL (ref 1.8–7.7)
NEUTROPHILS NFR BLD: 69.9 % (ref 38–73)
NITRITE UR QL STRIP: NEGATIVE
NRBC BLD-RTO: 0 /100 WBC
OHS QRS DURATION: 70 MS
OHS QTC CALCULATION: 451 MS
PH UR STRIP: 6 [PH] (ref 5–8)
PLATELET # BLD AUTO: 332 K/UL (ref 150–450)
PMV BLD AUTO: 9.7 FL (ref 9.2–12.9)
POC IONIZED CALCIUM: 1.21 MMOL/L (ref 1.06–1.42)
POC TCO2 (MEASURED): 24 MMOL/L (ref 23–29)
POTASSIUM BLD-SCNC: 3.7 MMOL/L (ref 3.5–5.1)
POTASSIUM SERPL-SCNC: 3.7 MMOL/L (ref 3.5–5.1)
PROT SERPL-MCNC: 8.6 G/DL (ref 6–8.4)
PROT UR QL STRIP: ABNORMAL
RBC # BLD AUTO: 4.65 M/UL (ref 4–5.4)
SAMPLE: ABNORMAL
SODIUM BLD-SCNC: 140 MMOL/L (ref 136–145)
SODIUM SERPL-SCNC: 139 MMOL/L (ref 136–145)
SP GR UR STRIP: 1.02 (ref 1–1.03)
URN SPEC COLLECT METH UR: ABNORMAL
WBC # BLD AUTO: 7.94 K/UL (ref 3.9–12.7)

## 2024-10-09 PROCEDURE — 80053 COMPREHEN METABOLIC PANEL: CPT | Performed by: EMERGENCY MEDICINE

## 2024-10-09 PROCEDURE — 93010 ELECTROCARDIOGRAM REPORT: CPT | Mod: ,,, | Performed by: INTERNAL MEDICINE

## 2024-10-09 PROCEDURE — 25000003 PHARM REV CODE 250: Performed by: EMERGENCY MEDICINE

## 2024-10-09 PROCEDURE — 99285 EMERGENCY DEPT VISIT HI MDM: CPT | Mod: 25

## 2024-10-09 PROCEDURE — 96361 HYDRATE IV INFUSION ADD-ON: CPT

## 2024-10-09 PROCEDURE — 25500020 PHARM REV CODE 255: Performed by: EMERGENCY MEDICINE

## 2024-10-09 PROCEDURE — 83735 ASSAY OF MAGNESIUM: CPT | Performed by: EMERGENCY MEDICINE

## 2024-10-09 PROCEDURE — 81003 URINALYSIS AUTO W/O SCOPE: CPT | Performed by: EMERGENCY MEDICINE

## 2024-10-09 PROCEDURE — 85025 COMPLETE CBC W/AUTO DIFF WBC: CPT | Performed by: EMERGENCY MEDICINE

## 2024-10-09 PROCEDURE — 96374 THER/PROPH/DIAG INJ IV PUSH: CPT

## 2024-10-09 PROCEDURE — 99284 EMERGENCY DEPT VISIT MOD MDM: CPT | Mod: ,,, | Performed by: INTERNAL MEDICINE

## 2024-10-09 PROCEDURE — 80047 BASIC METABLC PNL IONIZED CA: CPT

## 2024-10-09 PROCEDURE — 83690 ASSAY OF LIPASE: CPT | Performed by: EMERGENCY MEDICINE

## 2024-10-09 PROCEDURE — 63600175 PHARM REV CODE 636 W HCPCS: Performed by: EMERGENCY MEDICINE

## 2024-10-09 PROCEDURE — 93005 ELECTROCARDIOGRAM TRACING: CPT

## 2024-10-09 RX ORDER — FENTANYL CITRATE 50 UG/ML
50 INJECTION, SOLUTION INTRAMUSCULAR; INTRAVENOUS
Status: COMPLETED | OUTPATIENT
Start: 2024-10-09 | End: 2024-10-09

## 2024-10-09 RX ADMIN — SODIUM CHLORIDE 1000 ML: 9 INJECTION, SOLUTION INTRAVENOUS at 04:10

## 2024-10-09 RX ADMIN — IOHEXOL 75 ML: 350 INJECTION, SOLUTION INTRAVENOUS at 05:10

## 2024-10-09 RX ADMIN — FENTANYL CITRATE 50 MCG: 50 INJECTION INTRAMUSCULAR; INTRAVENOUS at 05:10

## 2024-10-09 NOTE — ED NOTES
Patient states emesis x2 this weekend, unable to IV access  for IVF this weekend, reports dizziness, nausea,  pain around ostomy site, also reports lower output from Ostomy

## 2024-10-09 NOTE — CONSULTS
[facility]  Gastroenterology Inflammatory Bowel Disease Inpatient Service   Consult Note    Patient Name: Ivy Salgado  MRN: [id number]  Admission Date: 10/9/2024  Hospital Length of Stay: 0 days  Code Status: Prior   Attending Provider: Jonas Mccoy MD   Consulting Provider: Vitaliy Benson MD  Primary Care Physician: Luis Madden DO  Principal Problem:<principal problem not specified>  Inpatient consult to Gastroenterology  Consult performed by: Vitaliy Benson MD  Consult ordered by: Vitaliy Benson MD        Subjective:     HPI: Ivy Salgado is a 44 y.o. female with Crohn's disease (diagnosed 1990) with end ileostomy and recurrent ileitis, recurrent C diff (2014 x 2), imuran induced pancreatitis, pancreatic tail cyst, recurrent UTI (h/o ESBL 2018) who presents with RLQ abdominal pain that began 5 days ago. At that time, patient was nauseous and vomiting on 10/5 and 10/6. The vomiting stopped on 10/7 and she was able to eat regular food on 10/8 night but notes lightheadedness and pre-syncopal symptoms today while at work. She denies further nausea and vomiting. Also reports decreased bowel movement frequency since onset of symptoms. Reports sick contacts with people having similar symptoms at home. Denies fevers but does have chills.    Previous History:  Ivy Salgado is a 44 y.o. female with Crohn's disease with end ileostomy and recurrent ileitis, recurrent C diff (2014 x 2), ARPITA/depression, arthritis, h/o abnormal pap smear- LYLA I, nephrolithiasis-nonobstructive, GERD, long QTc syndrome (Type III, on nadolol), s/p SHELLIE (2015) for recurrent ovarian cysts and endometriosis, early evolving melanoma in situ (buttocks and para-stomal site, excised in 2018 and 2019 respectively), history of genital HSV, imuran induced pancreatitis, pancreatic tail cyst, recurrent UTI (h/o ESBL 2018), multiple thyroid nodules, osteopenia, left femoral head chronic avascular  necrosis, history bacterial vaginosis, h/o abnormal LFTs (elevated ALP since 2016), family history of colon cancer.  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  She tried multiple meds including imuran (pancreatitis, remicade (secondary nonresponder), humira (DIL), MTX (leukopenia).  She met Dr. Cameron initially in GI clinic at [facility] 5/2009 at which time she had some diarrhea and on pred 30 mg/d. Colonoscopy 6/2009 significant for diffuse proctosigmoiditis with remainder of colon normal and end to end ileocolonic anastomsis with inflammation. Placed on rowasa enemas but too expensive so switched to steroid enemas.  Flex sig 10/2009 ongoing proctosigmoiditis.  In 2010 she had rectal bleeding that improved with proctofoam and started cimzia with improvement of symptoms.  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  Colonoscopy c/w moderate proctosigmoiditis and in 5/2010 she continued proctofoam BID and took extra dose of cimzia to induce remission.  In 7/2010 flex sig confirmed active left sided colitis and she started prednisone in 3/2011 with repeat colonoscopy 8/2011 c/w ongoing rectosigmoid inflammation with mild mucosal ulcer the transverse colon with ileocolonic anastomotic stricture and normal TI. In 9/2011 pt was on canasa, cimzia and prednisone taper.  In April/May 2012 pt hospitalized and CT c/w sigmoid wall thickening and colonoscopy confirmed distal 30 cm with moderate inflammation with ileocolonic anastomotic ulcers and normal TI.  In 6/2012 Dr Parsons proceeded with completion proctocolectomy with end ileostomy with pathology confirming transmural active inflammation with abscess c/w Crohn's disease.   Post-operatively she had peristomal ulcer and perineal wounds. In 1/2013 patient had non-healing perineal wound S/P debridement  with steroids and treated with cipro. In 8/2013 she had EUA with debridement and fulguration of non-healing perineal wound.  In 8/2013 pt had epigastric abd pain and EGD c/w benign gastric polyp, labs normal, CT c/w short segment WB thickening involving ileum proximal to the stoma and resolution of right adnexal mass.  In 9/2013 ileoscopy through stoma significant for segment mucosa at 5 cm proximal to stoma mild congested, eroded, ulcerated area of 6-30 cm and mucosal distal is normal. CT A/p 1/2024 c/w 2 separate segments of SB proximal to the ostomy and near staple row in RLQ with mild enhancement and wall thickening c/w mild inflammatory changes and segment of bowel forming ostomy.  SB enteroscopy 2/2014 significant for single 2 mm ulcer in the proximal ileum.  In 2014 she had 2 hospitalizations for high ileostomy output with was treated with prednisone with taper.  In 10/2014 CT showed few mild dilated loops SB in anterior right pelvis and loop with surgical suture line abuts anterior pelvic wall and possible adhesion. In 10/2014 ileoscopy through stomal normal.  In 1/2015 pt had focal segment of mild distal SB dilation and C diff positive and pt treated antibiotics followed by probiotics and resumed cimzia. Dr. Cameron then referred patient to Dr. Parish Ross in 2015 and he mentions recurrent C diff, recurrent SBOs likely related to adhesions. In 2015 she had SHELLIE/BSO with bladder repair and due to this missed one dose of cimzia and then resumed 5/2015. In 1/2016 she had partial SBO due to adhesions and UGI/SBFT and CT did not show fixed obstruction. In 10/2016 she reported to Dr. Ross postprandial nausea and epigastric pain, weight loss, fatigue, low grade fever and watery stool output and prednisone helped symptoms but not as good response as past. She was off of cimzia 8-9/2016  though sx occurred prior to holding cimzia. Ileoscopy through stoma 10/2016 significant for normal 15 cm of ileum examined. Overall Dr. Ross felt that her symptoms responded well to prednisone and restarting cimzia. She presented to her OV 1/2017 with increased ileostomy output with C diff negative with symptoms ongoing and MRE 5/2017 c/w long segment of diffuse wall thickening and mucosal enhancement involving the distal ileum. CT A/P 6/2017 significant for left ovarian cyst, mild biliary dilation stable, hypodensities and punctate bilateral renal calcifications.  Ileoscopy through stoma 8/2017 c/w normal ileum from stoma to 15-20 cm. CT A/P 11/2017 significant for righ adnexal mass abutting theright external iliac vein and pelvic US recommended, bilateral renal hypodensities and calcifications. MRE 11/2017 c/w mild questionable ileal inflammation and luminal narrowing involving short segment of SB within the right hemipelvis with mild dilation and upstream bowel 2.5 cm with findings concerning for developing stricture. She stopped cimzia 6/2017 and started stelara 7/11/2017 though discontinued in 1/2018 due to rash.  Due to symptoms pt was started in 1/2018 on prednisone 10 mg/d, bentyl. In 2/2018 CT A/P c/w bilateral nonobstructing renal calculi, mild bilateral cortical scarring of lower renal poles. MRE 9/2018 significant for short segment of distal ileum with scattered regions of non uniform wall thickening and possible additional short segment of proximal jejunum with mild wall thickening. In fall 2018 she was placed on entocort though due to fast transit was opening capsule and taking this. CT A/P 12/2018 c/w several bilateral renal hypodensities c/w cysts, nonobstructive bilateral nephrolithiasis, right adnexal complex cyst. She started entyvio 11/20/18 and due to recurrent UTIs and palpitations (dx QT prolongation and started on beta blocker) so discontinued in 10/27/20. MRE 5/15/19 significant for right  adnexal cystic lesion, large left adnexal cystic lesion.  On 5/30/19 patient had exploratory laparotomy with lysis of adhesions, BSO/mass resection. CT A/P 6/2019 significant for nonspecific rim enhancing fluid collection, mild left hydroureteronephrosis. In 2019 there were several mos she held entyvio due to gyn and melanoma surgery. MRE 5/2020 significant for small non enhancing fluid collection within presacral region superior to the vaginal cuff, right sided pelvocaliectasis, bilateral cortical renal cysts, left femoral head chronic avascular necrosis. CT A/P 9/2020 c/w mild left hydrouriteronephrosis due to distal left ureter stone with ass urothelial thickening and enhancement.  MRE 4/2021 right ovarian cyst likely hemorrhagic cyst.  In 1/2022 patient was placed by Dr. Ross on pantoprazole 40 mg BID. For joint pains and chronic abdominal pain Dr. Ross treated her with gabapentin for several years. In 1/2023 she had multiple intrarenal stones.  Repeat CT 10/2023 small non-obstructing renal stones. She was hospitalized 12/31/23-1/7/24 for high ileostomy output with stool studies neg for infection.  Elevated inflammatory markers (ESR 94, CRP 22.8), stool caprotectin 281. CT A/P showed slow flow through few mid to distal SB loops noting venous vascular engorgement about these loops and wall hyperemia. On 1/3/24 she had EGD c/w mild HP neg gastritis and ileoscopy through stoma with about 6 apthous ulcers in the distal 20 cm of the ileum and biopsies c/w active inflammation.  Pt had elevated LFTs (peaked 1/4/24 ///TB normal).  Hepatology consulted and serological workup negative and subsequent LFTs normalized with no intervention. Abdominal US revealed mildly dilated bile ducts in the central right hepatic lobe and MRCP c/w no evidence of biliary obstruction, punctate cystic focus in the pancreas tail likely side IPMN and f/u in 1 year recommended.  She was treated with antidiarrheals (imodium  helped but lomotil caused palpitations) and IVFs. Lotronex was being considered but due to prolonged QT unable to proceed with this. Due to mild ileitis plan for outpatient entyvio.  Since her discharge from hospital she had continued high ileostomy output and dehydration and we recommended that she return for hospitalization but pt did not wish to go to ER.  She continued with high ileostomy output up to 8 liters/day but if fasting down to 4 liters/day despiate taking imodium 1 tab QID.  She is not taking lomotil due to palpitations.  At her OV 1/16/24 due to high ileostomy output and dehydration I proceeded with hospital admission 1/16/24-1/27/24 at which time she continued on IVFs, antidiarrheals, pain meds and started on IV solumedrol with repeat stool calprotectin done on 1/16/24 93.9 though unclear accuracy given some granulation tissue on stoma could influence this test. On 1/24/24 stool calprotectin 117.6, ileoscopy through stoma with one small apthous ulcer 19 cm proximal to stoma. She had stool studies neg for infection and then discharged home on liquid imodium, pred 40 mg with taper and plan to start entyvio.   She restarted entyvio with the re induction doses on 1/30/2024 then resumed every 8 weeks infusions and by her office visit in 3/2024 patient no longer needed IVFs and was recovering well from the hospital stay. She reported recurrent UTIs with entyvio in the past and was advised to closely monitor her symptoms and notify our clinic.        IBD Details:  Prior Pertinent Surgeries:   surgery with SB and colon resection due to entero-vesicular fistula with abdominal abscess in 1992, 1998 6/2012 (Dr Parsons):  completion proctocolectomy with end ileostomy with pathology confirming transmural active inflammation with abscess c/w Crohn's disease  1/2013 EUA:  debridement of non-healing perineal wound  8/2013 EUA: debridement and fulguration of non-healing perineal wound.       Last pertinent  Endoscopy/Imagin23 CT A/P slow flow through few mid to distal SB loops noting venous vascular engorgement about these loops and wall hyperemia  1/3/24 EGD: normal except for gastric erythema, bx c/w mild HP neg gastritis   1/3/24 ileoscopy through stoma:  6 apthous ulcers in the distal 20 cm of the ileum and biopsies c/w active inflammation  1/3/24 abd US:  mildly dilated bile ducts in the central right hepatic lobe   24 MRCP c/w no evidence of biliary obstruction, punctate cystic focus in the pancreas tail likely side IPMN  24 MRE: short-segmented stricture with signs of mild active inflammation adjacent to the RLQ surgical anastomosis with tethering and distortion makes an underlying fistula difficult to exclude.   24 Ileoscopy: Patent end ileostomy with healthy appearing mucosa in the terminal ileum. The examined portion of the ileum was normal. Biopsies normal     Therapeutic Drug Monitoring Labs:  None     Prior IBD Therapies:  Proctofoam - partially effective  imuran (pancreatitis)  MTX (leukopenia)  Remicade- secondary nonresponder  Humira- discontinued due to drug induced lupus due to joint pains  Entocort- fall , broke open capsule when taking- not sure if effective   Cimzia [id number]- secondary nonresponder  Stelara ()- discontinued due to rash  Canasa ineffective   Prednisone- effective- last took -2024    Past Medical History:   Diagnosis Date    Abnormal Pap smear     Alkaline phosphatase elevation- based on lab from 2019    Arthritis     Avascular necrosis of bone of hip, left     Bacterial vaginosis     Crohn disease     Depression 2017    Drug-induced pancreatitis (imuran)     Encounter for blood transfusion     Generalized anxiety disorder     Genital HSV     GERD (gastroesophageal reflux disease)     Hypertension     Ileostomy in place 2012    Kidney stone     Long QT syndrome     Melanoma in situ (excised-buttocks 2018,  para-stomal site 2019)     Moderate episode of recurrent major depressive disorder 02/02/2024    Multiple thyroid nodules 11/27/2018    Osteopenia of multiple sites 01/07/2019    Pancreas cyst (tail, 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)     Recurrent Clostridioides difficile diarrhea     Recurrent UTI 04/03/2013     Past Surgical History:   Procedure Laterality Date    ABDOMINAL SURGERY      APPENDECTOMY      ARTHROPLASTY OF SHOULDER Left 7/18/2023    Procedure: ARTHROPLASTY, SHOULDER;  Surgeon: Sharon Babb MD;  Location: [facility] OR;  Service: Orthopedics;  Laterality: Left;    AUGMENTATION OF BREAST Bilateral 06/2022    gel implants    BILATERAL SALPINGO-OOPHORECTOMY (BSO) Bilateral 05/30/2019    Procedure: SALPINGO-OOPHORECTOMY, BILATERAL;  Surgeon: Rupa German MD;  Location: [facility] OR 2ND FLR;  Service: OB/GYN;  Laterality: Bilateral;    BLADDER SURGERY      partial cystectomy due to fistula    breast lift      BREAST SURGERY      [facility]      COLON SURGERY      COLONOSCOPY      CYSTOSCOPY  09/23/2020    Procedure: CYSTOSCOPY;  Surgeon: Sascha Florentino MD;  [facility];  Service: Urology;;    CYSTOSCOPY W/ URETERAL STENT PLACEMENT Left 09/15/2020    Procedure: CYSTOSCOPY, WITH URETERAL STENT INSERTION;  Surgeon: Sascha Florentino MD;  [facility];  Service: Urology;  Laterality: Left;    DIAGNOSTIC LAPAROSCOPY N/A 07/09/2020    Procedure: LAPAROSCOPY, DIAGNOSTIC;  Surgeon: Gilson El MD;  Location: [facility];  Service: OB/GYN;  Laterality: N/A;    ESOPHAGOGASTRODUODENOSCOPY N/A 1/3/2024    Procedure: EGD (ESOPHAGOGASTRODUODENOSCOPY);  Surgeon: Lily Lind MD;  Location: [facility] (2ND FLR);  Service: Endoscopy;  Laterality: N/A;    EXCISION OF MELANOMA  07/17/2019    ILEOSCOPY N/A 1/3/2024    Procedure: ILEOSCOPY;  Surgeon: Lily Lind MD;  Location: [facility] ENDO (2ND FLR);  Service: Endoscopy;  Laterality: N/A;    ILEOSCOPY N/A 1/24/2024    Procedure: ILEOSCOPY;  Surgeon: Lilian Navarro  MD MELISSA;  Location: [facility] ENDO (2ND FLR);  Service: Endoscopy;  Laterality: N/A;  through stoma    ILEOSCOPY N/A 6/4/2024    Procedure: ILEOSCOPY;  Surgeon: Peace Garcia MD;  Location: [facility] ENDO (4TH FLR);  Service: Endoscopy;  Laterality: N/A;  Ref By:,instr sent via portal,  received urgent message to reschedule pt from 7/15  to may or june-updated prep instructions sent-   5/29/24- precall complete - ERW    ILEOSTOMY      INSERTION OF TUNNELED CENTRAL VENOUS CATHETER (CVC) WITH SUBCUTANEOUS PORT Right 9/10/2024    Procedure: INSERTION, SINGLE LUMEN CATHETER WITH PORT, WITH FLUOROSCOPIC GUIDANCE, Rt neck or chest, prefers chest;  Surgeon: Vinh Lloyd MD;  Location: [facility] OR 2ND FLR;  Service: General;  Laterality: Right;    LAPAROSCOPIC LYSIS OF ADHESIONS N/A 07/09/2020    Procedure: LYSIS, ADHESIONS, LAPAROSCOPIC;  Surgeon: Gilson El MD;  Location: [facility] OR;  Service: OB/GYN;  Laterality: N/A;    LASER LITHOTRIPSY  09/23/2020    Procedure: LITHOTRIPSY, USING LASER;  Surgeon: Sascha Florentino MD;  Location: [facility] OR 1ST FLR;  Service: Urology;;    LYSIS OF ADHESIONS N/A 05/30/2019    Procedure: LYSIS, ADHESIONS;  Surgeon: Rupa German MD;  [facility];  Service: OB/GYN;  Laterality: N/A;    MEDIPORT REMOVAL Left 9/10/2024    Procedure: REMOVAL, CATHETER, CENTRAL VENOUS, TUNNELED, WITH PORT, Left side;  Surgeon: Vinh Lloyd MD;  Location: [facility] OR 2ND FLR;  Service: General;  Laterality: Left;    OOPHORECTOMY Right 04/16/2015    PORTACATH PLACEMENT  02/21/2017    SKIN BIOPSY      SMALL INTESTINE SURGERY      age 18 Y    TOTAL ABDOMINAL HYSTERECTOMY  04/16/2015    TOTAL COLECTOMY      TUBAL LIGATION  06/06/2012    UPPER GASTROINTESTINAL ENDOSCOPY      URETEROSCOPIC REMOVAL OF URETERIC CALCULUS  09/23/2020    Procedure: REMOVAL, CALCULUS, URETER, URETEROSCOPIC;  Surgeon: Sascha Florentino MD;  Location: [facility] OR 1ST FLR;  Service: Urology;;    URETEROSCOPY Left  09/23/2020    Procedure: URETEROSCOPY;  Surgeon: Sascha Florentino MD;  Location: [facility] OR [address] [place] [address];  Service: Urology;  Laterality: Left;     Family History   Problem Relation Name Age of Onset    Diabetes Paternal Grandfather Stephen     Hearing loss Paternal Grandmother Viviane     Cancer Maternal Grandfather Philippe         Skin    Skin cancer Maternal Grandfather Philippe     Diabetes Maternal Grandfather Philippe     Heart disease Maternal Grandfather Philippe     Colon cancer Father Milan     Cancer Father Milan         Colon    Liver cancer Father Milan     Hyperlipidemia Father Milan     Hypertension Mother Shaila     Crohn's disease Brother      Endometrial cancer Maternal Aunt      Breast cancer Maternal Cousin  41    Crohn's disease Daughter      Ovarian cancer Neg Hx      Melanoma Neg Hx       Social History     Socioeconomic History    Marital status: Single   Occupational History     Employer: [employer]   Tobacco Use    Smoking status: Never    Smokeless tobacco: Never   Substance and Sexual Activity    Alcohol use: Not Currently     Alcohol/week: 0.0 standard drinks of alcohol    Drug use: No    Sexual activity: Yes     Partners: Male     Birth control/protection: See Surgical Hx     Comment: HYST   Other Topics Concern    Are you pregnant or think you may be? No    Breast-feeding No     Social Drivers of Health     Financial Resource Strain: Low Risk  (4/18/2024)    Overall Financial Resource Strain (CARDIA)     Difficulty of Paying Living Expenses: Not hard at all   Food Insecurity: No Food Insecurity (4/18/2024)    Hunger Vital Sign     Worried About Running Out of Food in the Last Year: Never true     Ran Out of Food in the Last Year: Never true   Transportation Needs: No Transportation Needs (4/18/2024)    PRAPARE - Transportation     Lack of Transportation (Medical): No     Lack of Transportation (Non-Medical): No   Physical Activity: Insufficiently Active (1/17/2024)     "Exercise Vital Sign     Days of Exercise per Week: 3 days     Minutes of Exercise per Session: 30 min   Stress: No Stress Concern Present (4/18/2024)    [employer] - Occupational Stress Questionnaire     Feeling of Stress : Not at all   Housing Stability: Low Risk  (4/18/2024)    Housing Stability Vital Sign     Unable to Pay for Housing in the Last Year: No     Homeless in the Last Year: No     Scheduled Meds:  Continuous Infusions:  PRN Meds:.  Review of patient's allergies indicates:   Allergen Reactions    Azathioprine sodium Other (See Comments)     Other reaction(s): pancreatitis  Other reaction(s): pancreatitis    Methotrexate analogues Other (See Comments)     leukopenia    Stelara [ustekinumab] Other (See Comments)     Multiple infections    Zofran [ondansetron hcl (pf)] Other (See Comments)     Per patient causes prolong QT    Vancomycin analogues Other (See Comments)     Made her red    Azathioprine      Other reaction(s): Unknown    Methotrexate      Other reaction(s): infection-    Morphine Itching and Other (See Comments)     Other reaction(s): Itching    Zofran [ondansetron hcl]      Other reaction(s): Hives    Bactrim [sulfamethoxazole-trimethoprim] Palpitations    Ciprofloxacin Palpitations       Objective:   /66   Pulse 83   Temp 98.1 °F (36.7 °C) (Oral)   Resp 18   Ht 5' 1" (1.549 m)   Wt 49.9 kg (110 lb)   LMP 03/30/2015   SpO2 100%   Breastfeeding No   BMI 20.78 kg/m²   Constitutional:  not in acute distress and well developed  HENT: Head: Normal, normocephalic, atraumatic.  Eyes: conjunctiva clear and sclera nonicteric  Respiratory: normal chest expansion & respiratory effort   and no accessory muscle use  GI: soft, tender to palpation of RLQ, without masses or organomegaly  Skin: normal color  Psychiatric: mood and affect are within normal limits, pt is a good historian; no memory problems were noted    Labs:  WBC 7.94, lipase 36, UA negative for " UTI    Current Imaging:  CTAP pending    Assessment/Plan:   Impression:  Ivy Salgado is a 44 y.o. female with Crohn's disease (diagnosed 1990) with end ileostomy and recurrent ileitis, recurrent C diff (2014 x 2), imuran induced pancreatitis, pancreatic tail cyst, recurrent UTI (h/o ESBL 2018) who presents with RLQ abdominal pain that began 5 days ago with decreased stool frequency and N/V that self-resolved. Symptoms are likely 2/2 viral gastroenteritis especially given sick contacts with similar symptoms. Unlikely to be related to Crohn's disease.      Problem List:  RLQ abdominal pain    Recommendations:  - Awaiting imaging studies  - Avoid NSAIDs given risk of IBD exacerbation  - Narcotics increase risk of infection along with morbidity/mortality in IBD patients, tylenol preferred and if pain not controlled with tylenol then short-acting morphine preferred    Thank you for involving us in the care of Ivy Salgado. Please call with any additional questions, concerns or changes in the patient's clinical status.     Discussed with Dr. Kevin.    Vitaliy Benson MD  Gastroenterology Fellow PGY-IV  [facility]

## 2024-10-09 NOTE — ED PROVIDER NOTES
Chief Complaint   Vomiting (Right sided abd pain with not as much output from colostomy; concern for blockage)      History Of Present Illness   Ivy Salgado is a 44 y.o. female presenting with abdominal pain, nausea, decreased colostomy output.  The patient was concerned that she might be having bowel obstruction.  No fever or chills.  She has been around some friends who have similar symptoms, but her family does not have them.      Review of patient's allergies indicates:   Allergen Reactions    Azathioprine sodium Other (See Comments)     Other reaction(s): pancreatitis  Other reaction(s): pancreatitis    Methotrexate analogues Other (See Comments)     leukopenia    Stelara [ustekinumab] Other (See Comments)     Multiple infections    Zofran [ondansetron hcl (pf)] Other (See Comments)     Per patient causes prolong QT    Vancomycin analogues Other (See Comments)     Made her red    Azathioprine      Other reaction(s): Unknown    Methotrexate      Other reaction(s): infection-    Morphine Itching and Other (See Comments)     Other reaction(s): Itching    Zofran [ondansetron hcl]      Other reaction(s): Hives    Bactrim [sulfamethoxazole-trimethoprim] Palpitations    Ciprofloxacin Palpitations       No current facility-administered medications on file prior to encounter.     Current Outpatient Medications on File Prior to Encounter   Medication Sig Dispense Refill    clonazePAM (KLONOPIN) 1 MG tablet Take 1 tablet (1 mg total) by mouth 2 (two) times daily. 60 tablet 2    droNABinol (MARINOL) 10 MG capsule Take 1 capsule (10 mg total) by mouth once daily. 90 capsule 2    nadoloL (CORGARD) 40 MG tablet Take 1 tablet (40 mg total) by mouth once daily. Patient needs appointment before next refill. 90 tablet 7    oxyCODONE (OXY-IR) 5 mg Cap Take 1 capsule (5 mg total) by mouth every 4 (four) hours as needed for Pain. 5 capsule 0    pantoprazole (PROTONIX) 40 MG tablet Take 1 tablet (40 mg total) by mouth 2 (two)  Please advise where we can put this patient. Thank you.   times daily. 60 tablet 12    cyclobenzaprine (FLEXERIL) 10 MG tablet Take 1 tablet (10 mg total) by mouth every evening as needed for muscle pain 30 tablet 2    diphenoxylate-atropine 2.5-0.025 mg/5 ml (LOMOTIL) 2.5-0.025 mg/5 mL liquid Take 2.5-5 ml up to 4 times a day - dose might change at visit on 1/30 300 mL 0    estradiol 0.025 mg/24 hr 0.025 mg/24 hr Place 1 patch onto the skin once a week. (Patient taking differently: Place 1 patch onto the skin once a week. CHANGES ON MONDAYS) 4 patch 11    gabapentin (NEURONTIN) 300 MG capsule Take 1 capsule (300 mg total) by mouth 2 (two) times daily. 60 capsule 4    loperamide (IMODIUM) 1 mg/7.5 mL solution Take 4 mg by mouth 3 (three) times daily as needed for Diarrhea.      mirtazapine (REMERON SOL-TAB) 30 MG disintegrating tablet Take 1 tablet (30 mg total) by mouth nightly. 90 tablet 2    multivitamin (THERAGRAN) per tablet Take 1 tablet by mouth once daily.      promethazine (PHENERGAN) 25 MG tablet Take 1 tablet (25 mg total) by mouth every 6 (six) hours as needed for Nausea. 30 tablet 0    tamsulosin (FLOMAX) 0.4 mg Cap Take 1 capsule (0.4 mg total) by mouth once daily. 30 capsule 1    valACYclovir (VALTREX) 500 MG tablet Take 1 tablet (500 mg total) by mouth once daily. 90 tablet 1    vedolizumab (ENTYVIO) 300 mg SolR injection Inject 300 mg into the vein every 8 weeks.      zolpidem (AMBIEN) 10 mg Tab Take 1 tablet (10 mg total) by mouth every evening. 30 tablet 2       Past History   As per HPI and below:  Past Medical History:   Diagnosis Date    Abnormal Pap smear 2007    Alkaline phosphatase elevation- based on lab from 4/9/2019 05/28/2019    Arthritis     Avascular necrosis of bone of hip, left     Bacterial vaginosis     Crohn disease     Depression 08/05/2017    Drug-induced pancreatitis (imuran)     Encounter for blood transfusion     Generalized anxiety disorder     Genital HSV     GERD (gastroesophageal reflux disease)     Hypertension     Ileostomy  in place 07/09/2012    Kidney stone     Long QT syndrome     Melanoma in situ (excised-buttocks 2018, para-stomal site 2019)     Moderate episode of recurrent major depressive disorder 02/02/2024    Multiple thyroid nodules 11/27/2018    Osteopenia of multiple sites 01/07/2019    Pancreas cyst (tail, 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)     Recurrent Clostridioides difficile diarrhea     Recurrent UTI 04/03/2013     Past Surgical History:   Procedure Laterality Date    ABDOMINAL SURGERY      APPENDECTOMY      ARTHROPLASTY OF SHOULDER Left 7/18/2023    Procedure: ARTHROPLASTY, SHOULDER;  Surgeon: Sharon Babb MD;  Location: [facility] OR;  Service: Orthopedics;  Laterality: Left;    AUGMENTATION OF BREAST Bilateral 06/2022    gel implants    BILATERAL SALPINGO-OOPHORECTOMY (BSO) Bilateral 05/30/2019    Procedure: SALPINGO-OOPHORECTOMY, BILATERAL;  Surgeon: Rupa German MD;  Location: [facility] 2ND FLR;  Service: OB/GYN;  Laterality: Bilateral;    BLADDER SURGERY      partial cystectomy due to fistula    breast lift      BREAST SURGERY      [facility]      COLON SURGERY      COLONOSCOPY      CYSTOSCOPY  09/23/2020    Procedure: CYSTOSCOPY;  Surgeon: Sascha Florentino MD;  [address]  Service: Urology;;    CYSTOSCOPY W/ URETERAL STENT PLACEMENT Left 09/15/2020    Procedure: CYSTOSCOPY, WITH URETERAL STENT INSERTION;  Surgeon: Sascha Florentino MD;  Location: [facility] 1ST FLR;  Service: Urology;  Laterality: Left;    DIAGNOSTIC LAPAROSCOPY N/A 07/09/2020    Procedure: LAPAROSCOPY, DIAGNOSTIC;  Surgeon: Gilson El MD;  Location: [facility];  Service: OB/GYN;  Laterality: N/A;    ESOPHAGOGASTRODUODENOSCOPY N/A 1/3/2024    Procedure: EGD (ESOPHAGOGASTRODUODENOSCOPY);  Surgeon: Lily Lind MD;  Location: [facility] ENDO (2ND FLR);  Service: Endoscopy;  Laterality: N/A;    EXCISION OF MELANOMA  07/17/2019    ILEOSCOPY N/A 1/3/2024    Procedure: ILEOSCOPY;  Surgeon: Lily Lind MD;  Location: [facility] ENDO (2ND FLR);  Service:  Endoscopy;  Laterality: N/A;    ILEOSCOPY N/A 1/24/2024    Procedure: ILEOSCOPY;  Surgeon: Lilian Navarro MD;  Location: [facility] ENDO (2ND FLR);  Service: Endoscopy;  Laterality: N/A;  through stoma    ILEOSCOPY N/A 6/4/2024    Procedure: ILEOSCOPY;  Surgeon: Peace Garcia MD;  Location: [facility] ENDO (4TH FLR);  Service: Endoscopy;  Laterality: N/A;  Ref By:,kaleb sent via portal,AC  received urgent message to reschedule pt from 7/15  to may or june-updated prep instructions sent-   5/29/24- precall complete - ERW    ILEOSTOMY      INSERTION OF TUNNELED CENTRAL VENOUS CATHETER (CVC) WITH SUBCUTANEOUS PORT Right 9/10/2024    Procedure: INSERTION, SINGLE LUMEN CATHETER WITH PORT, WITH FLUOROSCOPIC GUIDANCE, Rt neck or chest, prefers chest;  Surgeon: Vinh Lloyd MD;  Location: [facility] OR 2ND FLR;  Service: General;  Laterality: Right;    LAPAROSCOPIC LYSIS OF ADHESIONS N/A 07/09/2020    Procedure: LYSIS, ADHESIONS, LAPAROSCOPIC;  Surgeon: Gilson El MD;  Location: [facility] OR;  Service: OB/GYN;  Laterality: N/A;    LASER LITHOTRIPSY  09/23/2020    Procedure: LITHOTRIPSY, USING LASER;  Surgeon: Sascha Florentino MD;  Location: [facility] OR 1ST FLR;  Service: Urology;;    LYSIS OF ADHESIONS N/A 05/30/2019    Procedure: LYSIS, ADHESIONS;  Surgeon: Rupa German MD;  Location: [facility] OR 2ND FLR;  Service: OB/GYN;  Laterality: N/A;    MEDIPORT REMOVAL Left 9/10/2024    Procedure: REMOVAL, CATHETER, CENTRAL VENOUS, TUNNELED, WITH PORT, Left side;  Surgeon: Vinh Lloyd MD;  Location: [facility] OR 2ND FLR;  Service: General;  Laterality: Left;    OOPHORECTOMY Right 04/16/2015    PORTACATH PLACEMENT  02/21/2017    SKIN BIOPSY      SMALL INTESTINE SURGERY      age 18 Y    TOTAL ABDOMINAL HYSTERECTOMY  04/16/2015    TOTAL COLECTOMY      TUBAL LIGATION  06/06/2012    UPPER GASTROINTESTINAL ENDOSCOPY      URETEROSCOPIC REMOVAL OF URETERIC CALCULUS  09/23/2020    Procedure: REMOVAL, CALCULUS, URETER, URETEROSCOPIC;   "Surgeon: Sascha Florentino MD;  Location: [facility] OR [address] [place] [address];  Service: Urology;;    URETEROSCOPY Left 09/23/2020    Procedure: URETEROSCOPY;  Surgeon: Sascha Florentino MD;  Location: [facility] OR [address] [place] [address];  Service: Urology;  Laterality: Left;       Social History     Tobacco Use    Smoking status: Never    Smokeless tobacco: Never   Substance Use Topics    Alcohol use: Not Currently     Alcohol/week: 0.0 standard drinks of alcohol    Drug use: No       Family History   Problem Relation Name Age of Onset    Diabetes Paternal Grandfather Stephen     Hearing loss Paternal Grandmother Vivaine     Cancer Maternal Grandfather Philippe         Skin    Skin cancer Maternal Grandfather Philippe     Diabetes Maternal Grandfather Philippe     Heart disease Maternal Grandfather Philippe     Colon cancer Father Milan     Cancer Father Milan         Colon    Liver cancer Father Milan     Hyperlipidemia Father Milan     Hypertension Mother Shaila     Crohn's disease Brother      Endometrial cancer Maternal Aunt      Breast cancer Maternal Cousin  41    Crohn's disease Daughter      Ovarian cancer Neg Hx      Melanoma Neg Hx         Physical Exam     Vitals:    10/09/24 1349 10/09/24 1703 10/09/24 1709 10/09/24 1732   BP: (!) 144/77 132/66  123/78   BP Location: Right arm   Right arm   Patient Position:    Sitting   Pulse: 89 83  87   Resp: 20  18 14   Temp: 98.1 °F (36.7 °C)      TempSrc: Oral      SpO2: 100%   97%   Weight: 49.9 kg (110 lb)      Height: 5' 1" (1.549 m)        Appearance: No acute distress.  Skin: No rashes seen.  Good turgor.  No abrasions.  No ecchymoses.  Chest: Clear to auscultation bilaterally.  Good air movement.  No wheezes.  No rhonchi.  Cardiovascular: Regular rate and rhythm.  No murmurs. No gallops. No rubs.  Abdomen: Soft.  Not distended.  Mild right sided tenderness.  No guarding.  No rebound.  Musculoskeletal: Good range of motion all joints.  No deformities.  Neck supple.  No " meningismus.  Neurologic: Motor intact.  Sensation intact.  Cranial nerves intact.  Mental Status:  Alert and oriented x 3.  Appropriate, conversant.      Initial MDM   Abdominal pain, nausea, decreased colostomy output.  The patient has extensive experience dealing with complications of her Crohn's disease.  She would like a CT scans.  Will obtain CT and labs.    Medications Given     Medications   sodium chloride 0.9% bolus 1,000 mL 1,000 mL (0 mLs Intravenous Stopped 10/9/24 1804)   fentaNYL 50 mcg/mL injection 50 mcg (50 mcg Intravenous Given 10/9/24 1709)   iohexoL (OMNIPAQUE 350) injection 75 mL (75 mLs Intravenous Given 10/9/24 1724)       Results and Course     Abnormal Labs Reviewed   COMPREHENSIVE METABOLIC PANEL - Abnormal; Notable for the following components:       Result Value    CO2 21 (*)     Glucose 114 (*)     Total Protein 8.6 (*)     All other components within normal limits   URINALYSIS, REFLEX TO URINE CULTURE - Abnormal; Notable for the following components:    Protein, UA Trace (*)     All other components within normal limits    Narrative:     Specimen Source->Urine   ISTAT PROCEDURE - Abnormal; Notable for the following components:    POC Glucose 119 (*)     All other components within normal limits       Imaging Results              CT Abdomen Pelvis With IV Contrast NO Oral Contrast (Final result)  Result time 10/09/24 17:55:17      Final result by Kelby Solano MD (10/09/24 17:55:17)                   Impression:      1. Surgical changes of colectomy with end ileostomy.  No findings to suggest small bowel obstruction, hyperemia, or wall thickening.  There is some venous vascular engorgement of the central mesentery associated with a few loops of unremarkable appearing small bowel.  This may reflect residual change from previous infection or inflammation.  2. Hepatomegaly, correlation with LFTs recommended.  3. Bilateral nonobstructive nephrolithiasis.  4. Please see above for  several additional findings.      Electronically signed by: Kelby Solano MD  Date:    10/09/2024  Time:    17:55               Narrative:    EXAMINATION:  CT ABDOMEN PELVIS WITH IV CONTRAST    CLINICAL HISTORY:  Bowel obstruction suspected;    TECHNIQUE:  Low dose axial images, sagittal and coronal reformations were obtained from the lung bases to the pubic symphysis following the IV administration of 75 mL of Omnipaque 350 .  Oral contrast was not given.    COMPARISON:  MRI 05/17/2024, CT abdomen and pelvis 04/16/2024    FINDINGS:  Images of the lower thorax are remarkable for mild left basilar dependent atelectasis/scarring.    Allowing for motion artifact, the liver is enlarged, correlation with LFTs recommended.  The spleen, pancreas, gallbladder and adrenal glands are grossly unremarkable.  There is no biliary dilation or ascites.  The stomach is nondistended, no significant gastric wall thickening.  The portal vein, splenic vein, SMV, celiac axis and SMA all are patent noting some degree of stenosis at the origin of the celiac artery without occlusion.  No significant abdominal lymphadenopathy.    The kidneys enhance symmetrically without hydronephrosis.  There is bilateral nonobstructive nephrolithiasis.  Low attenuating lesions arise from the kidneys, too small for characterization.  The bilateral ureters are unable to be followed in their entirety to the urinary bladder, no definite calculi seen or secondary findings to suggest obstructive uropathy.  The urinary bladder is unremarkable.  The uterus is absent the adnexa is unremarkable.  There is mild fluid in the vaginal wall, correlation is advised.    There is surgical change of colectomy with end ileostomy.  No significant small bowel wall thickening noting slow flow through a few small bowel loops in the lower pelvis.  No small bowel wall hyperemia.  No focal organized collection to suggest abscess.  No pneumatosis or free air.  There are a few  scattered shotty periaortic, pericaval, and mesenteric lymph nodes.    There is a focus of calcification within the right gluteal soft tissues suggesting injection granuloma.  There are degenerative changes of the spine.  No significant inguinal lymphadenopathy.                                      ED Course as of 10/09/24 1922   Wed Oct 09, 2024   1544 EKG 12-lead  EKG shows normal sinus rhythm and no acute ischemia per my independent interpretation.     [DC]   1704 Magnesium : 1.9 [DC]   1704 Lipase: 36 [DC]   1704 WBC: 7.94 [DC]   1704 Hemoglobin: 13.3 [DC]   1704 Platelet Count: 332 [DC]   1704 Creatinine: 0.8 [DC]      ED Course User Index  [DC] Jonas Mccoy MD               MDM, Impression and Plan   44 y.o. female with nausea, vomiting and some mild abdominal discomfort.  Symptoms much improved with fluids and medication.  CT and labs do not show anything acute.  Patient is tolerating p.o. in ED. will discharge with PCP follow-up.  She is still nervous about her history of prolonged QT, so will avoid prescription antiemetics at this time.  Counseled her on small, frequent volumes of fluids to avoid stomach distention.         Final diagnoses:  [I45.81] Prolonged QT interval syndrome  [R11.2] Nausea and vomiting, unspecified vomiting type (Primary)        ED Disposition Condition    Discharge Stable          ED Prescriptions    None       Follow-up Information       Follow up With Specialties Details Why Contact Luis Benavidez, DO Family Medicine Schedule an appointment as soon as possible for a visit   [address] [facility]  [address]  [facility] [address]  [phone]                 Jonas Mccoy MD  10/09/24 1923

## 2024-10-09 NOTE — ED NOTES
I-STAT Chem-8+ Results:   Value Reference Range   Sodium 140 136-145 mmol/L   Potassium  3.7 3.5-5.1 mmol/L   Chloride 106  mmol/L   Ionized Calcium 1.21 1.06-1.42 mmol/L   CO2 (measured) 24 23-29 mmol/L   Glucose 119  mg/dL   BUN 12 6-30 mg/dL   Creatinine 0.7 0.5-1.4 mg/dL   Hematocrit 41 36-54%

## 2024-10-10 ENCOUNTER — PATIENT OUTREACH (OUTPATIENT)
Dept: EMERGENCY MEDICINE | Facility: HOSPITAL | Age: 44
End: 2024-10-10
Payer: COMMERCIAL

## 2024-10-10 ENCOUNTER — PATIENT MESSAGE (OUTPATIENT)
Dept: GASTROENTEROLOGY | Facility: CLINIC | Age: 44
End: 2024-10-10
Payer: COMMERCIAL

## 2024-10-10 ENCOUNTER — PATIENT MESSAGE (OUTPATIENT)
Dept: INTERNAL MEDICINE | Facility: CLINIC | Age: 44
End: 2024-10-10
Payer: COMMERCIAL

## 2024-10-10 NOTE — TELEPHONE ENCOUNTER
Per Dr. Garcia:  - unless significant volume loss (vomiting/high ostomy output), pt should increase PO fluid intake    LVM to return call at ext [phone].

## 2024-10-10 NOTE — TELEPHONE ENCOUNTER
"Spoke with Ivy:  - she reports "lingering nausea" that won't resolve  - reduced appetite  - denies emesis and high ostomy output  - reports dizziness, lightheadedness, and palpitations "same as when I was the ER"  - since she's not having any loss of fluids, she is encouraged to increase her PO fluid intake  - reviewed if no indication for treatment, insurance may not pay the claim  - reviewed the nation wide IV fluid shortage  - she states, "thanks for nothing" and the call was ended by the pt  "

## 2024-10-11 NOTE — PROGRESS NOTES
Call placed to Pt to f/u from recent ED visit. No answer x 2 attempts. Encounter to be closed at this time.E-Visit link sent.

## 2024-10-16 DIAGNOSIS — F41.1 GENERALIZED ANXIETY DISORDER: ICD-10-CM

## 2024-10-16 RX ORDER — CLONAZEPAM 1 MG/1
1 TABLET ORAL 2 TIMES DAILY
Qty: 60 TABLET | Refills: 2 | Status: ON HOLD | OUTPATIENT
Start: 2024-10-16

## 2024-10-16 NOTE — TELEPHONE ENCOUNTER
No care due was identified.  [facility] Embedded Care Due Messages. Reference number: [id number].   10/16/2024 3:04:13 AM CDT

## 2024-10-18 ENCOUNTER — HOSPITAL ENCOUNTER (INPATIENT)
Facility: HOSPITAL | Age: 44
LOS: 4 days | Discharge: HOME | DRG: 389 | End: 2024-10-23
Attending: EMERGENCY MEDICINE | Admitting: COLON & RECTAL SURGERY
Payer: COMMERCIAL

## 2024-10-18 DIAGNOSIS — K56.609 SMALL BOWEL OBSTRUCTION: ICD-10-CM

## 2024-10-18 DIAGNOSIS — K56.609 SBO (SMALL BOWEL OBSTRUCTION): Primary | ICD-10-CM

## 2024-10-18 DIAGNOSIS — R30.0 DYSURIA: ICD-10-CM

## 2024-10-18 DIAGNOSIS — A04.71 RECURRENT CLOSTRIDIOIDES DIFFICILE DIARRHEA: ICD-10-CM

## 2024-10-18 DIAGNOSIS — R10.9 ABDOMINAL PAIN, UNSPECIFIED ABDOMINAL LOCATION: ICD-10-CM

## 2024-10-18 LAB
ALBUMIN SERPL BCP-MCNC: 3.6 G/DL (ref 3.5–5.2)
ALP SERPL-CCNC: 150 U/L (ref 40–150)
ALT SERPL W/O P-5'-P-CCNC: 32 U/L (ref 10–44)
ANION GAP SERPL CALC-SCNC: 13 MMOL/L (ref 8–16)
AST SERPL-CCNC: 48 U/L (ref 10–40)
BASOPHILS # BLD AUTO: 0.03 K/UL (ref 0–0.2)
BASOPHILS NFR BLD: 0.3 % (ref 0–1.9)
BILIRUB SERPL-MCNC: 0.3 MG/DL (ref 0.1–1)
BUN SERPL-MCNC: 9 MG/DL (ref 6–20)
CALCIUM SERPL-MCNC: 9.5 MG/DL (ref 8.7–10.5)
CHLORIDE SERPL-SCNC: 103 MMOL/L (ref 95–110)
CO2 SERPL-SCNC: 25 MMOL/L (ref 23–29)
CREAT SERPL-MCNC: 0.8 MG/DL (ref 0.5–1.4)
DIFFERENTIAL METHOD BLD: ABNORMAL
EOSINOPHIL # BLD AUTO: 0.1 K/UL (ref 0–0.5)
EOSINOPHIL NFR BLD: 0.4 % (ref 0–8)
ERYTHROCYTE [DISTWIDTH] IN BLOOD BY AUTOMATED COUNT: 14 % (ref 11.5–14.5)
EST. GFR  (NO RACE VARIABLE): >60 ML/MIN/1.73 M^2
GLUCOSE SERPL-MCNC: 110 MG/DL (ref 70–110)
HCT VFR BLD AUTO: 39.5 % (ref 37–48.5)
HGB BLD-MCNC: 13 G/DL (ref 12–16)
IMM GRANULOCYTES # BLD AUTO: 0.03 K/UL (ref 0–0.04)
IMM GRANULOCYTES NFR BLD AUTO: 0.3 % (ref 0–0.5)
LACTATE SERPL-SCNC: 1.8 MMOL/L (ref 0.5–2.2)
LIPASE SERPL-CCNC: 33 U/L (ref 4–60)
LYMPHOCYTES # BLD AUTO: 1.4 K/UL (ref 1–4.8)
LYMPHOCYTES NFR BLD: 11.9 % (ref 18–48)
MCH RBC QN AUTO: 28.5 PG (ref 27–31)
MCHC RBC AUTO-ENTMCNC: 32.9 G/DL (ref 32–36)
MCV RBC AUTO: 87 FL (ref 82–98)
MONOCYTES # BLD AUTO: 0.8 K/UL (ref 0.3–1)
MONOCYTES NFR BLD: 7 % (ref 4–15)
NEUTROPHILS # BLD AUTO: 9.3 K/UL (ref 1.8–7.7)
NEUTROPHILS NFR BLD: 80.1 % (ref 38–73)
NRBC BLD-RTO: 0 /100 WBC
PLATELET # BLD AUTO: 322 K/UL (ref 150–450)
PMV BLD AUTO: 10 FL (ref 9.2–12.9)
POTASSIUM SERPL-SCNC: 3.5 MMOL/L (ref 3.5–5.1)
PROT SERPL-MCNC: 7.8 G/DL (ref 6–8.4)
RBC # BLD AUTO: 4.56 M/UL (ref 4–5.4)
SODIUM SERPL-SCNC: 141 MMOL/L (ref 136–145)
WBC # BLD AUTO: 11.58 K/UL (ref 3.9–12.7)

## 2024-10-18 PROCEDURE — 83605 ASSAY OF LACTIC ACID: CPT

## 2024-10-18 PROCEDURE — 83690 ASSAY OF LIPASE: CPT

## 2024-10-18 PROCEDURE — 63600175 PHARM REV CODE 636 W HCPCS

## 2024-10-18 PROCEDURE — 99285 EMERGENCY DEPT VISIT HI MDM: CPT | Mod: 25

## 2024-10-18 PROCEDURE — 96376 TX/PRO/DX INJ SAME DRUG ADON: CPT

## 2024-10-18 PROCEDURE — 25500020 PHARM REV CODE 255: Performed by: EMERGENCY MEDICINE

## 2024-10-18 PROCEDURE — 96374 THER/PROPH/DIAG INJ IV PUSH: CPT

## 2024-10-18 PROCEDURE — 81001 URINALYSIS AUTO W/SCOPE: CPT | Performed by: EMERGENCY MEDICINE

## 2024-10-18 PROCEDURE — 80053 COMPREHEN METABOLIC PANEL: CPT

## 2024-10-18 PROCEDURE — 85025 COMPLETE CBC W/AUTO DIFF WBC: CPT

## 2024-10-18 PROCEDURE — 86140 C-REACTIVE PROTEIN: CPT

## 2024-10-18 RX ORDER — HYDROMORPHONE HYDROCHLORIDE 1 MG/ML
1 INJECTION, SOLUTION INTRAMUSCULAR; INTRAVENOUS; SUBCUTANEOUS
Status: COMPLETED | OUTPATIENT
Start: 2024-10-18 | End: 2024-10-18

## 2024-10-18 RX ORDER — HYDROMORPHONE HYDROCHLORIDE 1 MG/ML
1 INJECTION, SOLUTION INTRAMUSCULAR; INTRAVENOUS; SUBCUTANEOUS ONCE
Status: COMPLETED | OUTPATIENT
Start: 2024-10-18 | End: 2024-10-18

## 2024-10-18 RX ORDER — LORAZEPAM 0.5 MG/1
0.5 TABLET ORAL EVERY 6 HOURS PRN
Status: DISCONTINUED | OUTPATIENT
Start: 2024-10-18 | End: 2024-10-23 | Stop reason: HOSPADM

## 2024-10-18 RX ADMIN — HYDROMORPHONE HYDROCHLORIDE 1 MG: 1 INJECTION, SOLUTION INTRAMUSCULAR; INTRAVENOUS; SUBCUTANEOUS at 09:10

## 2024-10-18 RX ADMIN — IOHEXOL 75 ML: 350 INJECTION, SOLUTION INTRAVENOUS at 09:10

## 2024-10-18 RX ADMIN — HYDROMORPHONE HYDROCHLORIDE 1 MG: 1 INJECTION, SOLUTION INTRAMUSCULAR; INTRAVENOUS; SUBCUTANEOUS at 11:10

## 2024-10-19 LAB
ANION GAP SERPL CALC-SCNC: 10 MMOL/L (ref 8–16)
BACTERIA #/AREA URNS AUTO: ABNORMAL /HPF
BASOPHILS # BLD AUTO: 0.02 K/UL (ref 0–0.2)
BASOPHILS NFR BLD: 0.2 % (ref 0–1.9)
BILIRUB UR QL STRIP: NEGATIVE
BUN SERPL-MCNC: 8 MG/DL (ref 6–20)
CALCIUM SERPL-MCNC: 8.7 MG/DL (ref 8.7–10.5)
CHLORIDE SERPL-SCNC: 106 MMOL/L (ref 95–110)
CLARITY UR REFRACT.AUTO: CLEAR
CO2 SERPL-SCNC: 23 MMOL/L (ref 23–29)
COLOR UR AUTO: YELLOW
CREAT SERPL-MCNC: 0.8 MG/DL (ref 0.5–1.4)
CRP SERPL-MCNC: 2.8 MG/L (ref 0–8.2)
CRP SERPL-MCNC: 3.2 MG/L (ref 0–8.2)
DIFFERENTIAL METHOD BLD: ABNORMAL
EOSINOPHIL # BLD AUTO: 0.1 K/UL (ref 0–0.5)
EOSINOPHIL NFR BLD: 1.1 % (ref 0–8)
ERYTHROCYTE [DISTWIDTH] IN BLOOD BY AUTOMATED COUNT: 14.3 % (ref 11.5–14.5)
EST. GFR  (NO RACE VARIABLE): >60 ML/MIN/1.73 M^2
GLUCOSE SERPL-MCNC: 112 MG/DL (ref 70–110)
GLUCOSE UR QL STRIP: NEGATIVE
HCT VFR BLD AUTO: 36.2 % (ref 37–48.5)
HGB BLD-MCNC: 12 G/DL (ref 12–16)
HGB UR QL STRIP: NEGATIVE
HYALINE CASTS UR QL AUTO: 0 /LPF
IMM GRANULOCYTES # BLD AUTO: 0.01 K/UL (ref 0–0.04)
IMM GRANULOCYTES NFR BLD AUTO: 0.1 % (ref 0–0.5)
KETONES UR QL STRIP: NEGATIVE
LEUKOCYTE ESTERASE UR QL STRIP: NEGATIVE
LYMPHOCYTES # BLD AUTO: 2.1 K/UL (ref 1–4.8)
LYMPHOCYTES NFR BLD: 25.4 % (ref 18–48)
MAGNESIUM SERPL-MCNC: 1.8 MG/DL (ref 1.6–2.6)
MCH RBC QN AUTO: 28.2 PG (ref 27–31)
MCHC RBC AUTO-ENTMCNC: 33.1 G/DL (ref 32–36)
MCV RBC AUTO: 85 FL (ref 82–98)
MICROSCOPIC COMMENT: ABNORMAL
MONOCYTES # BLD AUTO: 0.8 K/UL (ref 0.3–1)
MONOCYTES NFR BLD: 9.6 % (ref 4–15)
NEUTROPHILS # BLD AUTO: 5.2 K/UL (ref 1.8–7.7)
NEUTROPHILS NFR BLD: 63.6 % (ref 38–73)
NITRITE UR QL STRIP: NEGATIVE
NRBC BLD-RTO: 0 /100 WBC
PH UR STRIP: 8 [PH] (ref 5–8)
PHOSPHATE SERPL-MCNC: 4.3 MG/DL (ref 2.7–4.5)
PLATELET # BLD AUTO: 282 K/UL (ref 150–450)
PMV BLD AUTO: 10.1 FL (ref 9.2–12.9)
POTASSIUM SERPL-SCNC: 3.7 MMOL/L (ref 3.5–5.1)
PROT UR QL STRIP: ABNORMAL
RBC # BLD AUTO: 4.25 M/UL (ref 4–5.4)
RBC #/AREA URNS AUTO: 2 /HPF (ref 0–4)
SODIUM SERPL-SCNC: 139 MMOL/L (ref 136–145)
SP GR UR STRIP: >1.03 (ref 1–1.03)
SQUAMOUS #/AREA URNS AUTO: 12 /HPF
URN SPEC COLLECT METH UR: ABNORMAL
WBC # BLD AUTO: 8.2 K/UL (ref 3.9–12.7)
WBC #/AREA URNS AUTO: 7 /HPF (ref 0–5)

## 2024-10-19 PROCEDURE — 99222 1ST HOSP IP/OBS MODERATE 55: CPT | Mod: ,,, | Performed by: COLON & RECTAL SURGERY

## 2024-10-19 PROCEDURE — 36415 COLL VENOUS BLD VENIPUNCTURE: CPT | Performed by: STUDENT IN AN ORGANIZED HEALTH CARE EDUCATION/TRAINING PROGRAM

## 2024-10-19 PROCEDURE — 86140 C-REACTIVE PROTEIN: CPT | Performed by: STUDENT IN AN ORGANIZED HEALTH CARE EDUCATION/TRAINING PROGRAM

## 2024-10-19 PROCEDURE — 20600001 HC STEP DOWN PRIVATE ROOM

## 2024-10-19 PROCEDURE — 80048 BASIC METABOLIC PNL TOTAL CA: CPT | Performed by: STUDENT IN AN ORGANIZED HEALTH CARE EDUCATION/TRAINING PROGRAM

## 2024-10-19 PROCEDURE — 83735 ASSAY OF MAGNESIUM: CPT | Performed by: STUDENT IN AN ORGANIZED HEALTH CARE EDUCATION/TRAINING PROGRAM

## 2024-10-19 PROCEDURE — 84100 ASSAY OF PHOSPHORUS: CPT | Performed by: STUDENT IN AN ORGANIZED HEALTH CARE EDUCATION/TRAINING PROGRAM

## 2024-10-19 PROCEDURE — 63600175 PHARM REV CODE 636 W HCPCS: Performed by: STUDENT IN AN ORGANIZED HEALTH CARE EDUCATION/TRAINING PROGRAM

## 2024-10-19 PROCEDURE — 25000003 PHARM REV CODE 250: Performed by: STUDENT IN AN ORGANIZED HEALTH CARE EDUCATION/TRAINING PROGRAM

## 2024-10-19 PROCEDURE — 63600175 PHARM REV CODE 636 W HCPCS

## 2024-10-19 PROCEDURE — 25000003 PHARM REV CODE 250

## 2024-10-19 PROCEDURE — 85025 COMPLETE CBC W/AUTO DIFF WBC: CPT | Performed by: STUDENT IN AN ORGANIZED HEALTH CARE EDUCATION/TRAINING PROGRAM

## 2024-10-19 PROCEDURE — 99223 1ST HOSP IP/OBS HIGH 75: CPT | Mod: ,,, | Performed by: STUDENT IN AN ORGANIZED HEALTH CARE EDUCATION/TRAINING PROGRAM

## 2024-10-19 RX ORDER — TAMSULOSIN HYDROCHLORIDE 0.4 MG/1
0.4 CAPSULE ORAL DAILY
Status: DISCONTINUED | OUTPATIENT
Start: 2024-10-19 | End: 2024-10-23 | Stop reason: HOSPADM

## 2024-10-19 RX ORDER — VALACYCLOVIR HYDROCHLORIDE 500 MG/1
500 TABLET, FILM COATED ORAL DAILY
Status: DISCONTINUED | OUTPATIENT
Start: 2024-10-19 | End: 2024-10-23 | Stop reason: HOSPADM

## 2024-10-19 RX ORDER — DEXTROSE MONOHYDRATE AND SODIUM CHLORIDE 5; .9 G/100ML; G/100ML
INJECTION, SOLUTION INTRAVENOUS CONTINUOUS
Status: DISCONTINUED | OUTPATIENT
Start: 2024-10-19 | End: 2024-10-22

## 2024-10-19 RX ORDER — SUCRALFATE 1 G/10ML
1 SUSPENSION ORAL EVERY 6 HOURS
Status: DISCONTINUED | OUTPATIENT
Start: 2024-10-19 | End: 2024-10-23 | Stop reason: HOSPADM

## 2024-10-19 RX ORDER — CYCLOBENZAPRINE HCL 5 MG
10 TABLET ORAL NIGHTLY
Status: DISCONTINUED | OUTPATIENT
Start: 2024-10-19 | End: 2024-10-23 | Stop reason: HOSPADM

## 2024-10-19 RX ORDER — SODIUM CHLORIDE 0.9 % (FLUSH) 0.9 %
10 SYRINGE (ML) INJECTION
Status: DISCONTINUED | OUTPATIENT
Start: 2024-10-19 | End: 2024-10-23 | Stop reason: HOSPADM

## 2024-10-19 RX ORDER — GABAPENTIN 300 MG/1
300 CAPSULE ORAL 2 TIMES DAILY
Status: DISCONTINUED | OUTPATIENT
Start: 2024-10-19 | End: 2024-10-23 | Stop reason: HOSPADM

## 2024-10-19 RX ORDER — ENOXAPARIN SODIUM 100 MG/ML
40 INJECTION SUBCUTANEOUS EVERY 24 HOURS
Status: DISCONTINUED | OUTPATIENT
Start: 2024-10-19 | End: 2024-10-23 | Stop reason: HOSPADM

## 2024-10-19 RX ORDER — PROPRANOLOL HYDROCHLORIDE 20 MG/1
20 TABLET ORAL 2 TIMES DAILY
Status: DISCONTINUED | OUTPATIENT
Start: 2024-10-19 | End: 2024-10-21

## 2024-10-19 RX ORDER — NALOXONE HCL 0.4 MG/ML
0.02 VIAL (ML) INJECTION
Status: DISCONTINUED | OUTPATIENT
Start: 2024-10-19 | End: 2024-10-23 | Stop reason: HOSPADM

## 2024-10-19 RX ORDER — HYDROMORPHONE HYDROCHLORIDE 1 MG/ML
0.5 INJECTION, SOLUTION INTRAMUSCULAR; INTRAVENOUS; SUBCUTANEOUS EVERY 4 HOURS PRN
Status: DISCONTINUED | OUTPATIENT
Start: 2024-10-19 | End: 2024-10-23

## 2024-10-19 RX ORDER — ALUMINUM HYDROXIDE, MAGNESIUM HYDROXIDE, AND SIMETHICONE 1200; 120; 1200 MG/30ML; MG/30ML; MG/30ML
30 SUSPENSION ORAL
Status: DISCONTINUED | OUTPATIENT
Start: 2024-10-19 | End: 2024-10-23 | Stop reason: HOSPADM

## 2024-10-19 RX ORDER — HYDROMORPHONE HYDROCHLORIDE 1 MG/ML
1 INJECTION, SOLUTION INTRAMUSCULAR; INTRAVENOUS; SUBCUTANEOUS EVERY 4 HOURS PRN
Status: DISCONTINUED | OUTPATIENT
Start: 2024-10-19 | End: 2024-10-23

## 2024-10-19 RX ADMIN — DEXTROSE MONOHYDRATE AND SODIUM CHLORIDE: 5; .9 INJECTION, SOLUTION INTRAVENOUS at 12:10

## 2024-10-19 RX ADMIN — ENOXAPARIN SODIUM 40 MG: 40 INJECTION SUBCUTANEOUS at 05:10

## 2024-10-19 RX ADMIN — DEXTROSE MONOHYDRATE AND SODIUM CHLORIDE: 5; .9 INJECTION, SOLUTION INTRAVENOUS at 07:10

## 2024-10-19 RX ADMIN — LORAZEPAM 0.5 MG: 0.5 TABLET ORAL at 01:10

## 2024-10-19 RX ADMIN — SUCRALFATE 1 G: 1 SUSPENSION ORAL at 11:10

## 2024-10-19 RX ADMIN — HYDROMORPHONE HYDROCHLORIDE 1 MG: 1 INJECTION, SOLUTION INTRAMUSCULAR; INTRAVENOUS; SUBCUTANEOUS at 09:10

## 2024-10-19 RX ADMIN — VALACYCLOVIR HYDROCHLORIDE 500 MG: 500 TABLET, FILM COATED ORAL at 09:10

## 2024-10-19 RX ADMIN — TAMSULOSIN HYDROCHLORIDE 0.4 MG: 0.4 CAPSULE ORAL at 09:10

## 2024-10-19 RX ADMIN — CYCLOBENZAPRINE HYDROCHLORIDE 10 MG: 5 TABLET, FILM COATED ORAL at 01:10

## 2024-10-19 RX ADMIN — LORAZEPAM 0.5 MG: 0.5 TABLET ORAL at 07:10

## 2024-10-19 RX ADMIN — ALUMINUM HYDROXIDE, MAGNESIUM HYDROXIDE, AND SIMETHICONE 30 ML: 200; 200; 20 SUSPENSION ORAL at 04:10

## 2024-10-19 RX ADMIN — PROMETHAZINE HYDROCHLORIDE 12.5 MG: 25 INJECTION INTRAMUSCULAR; INTRAVENOUS at 10:10

## 2024-10-19 RX ADMIN — LORAZEPAM 0.5 MG: 0.5 TABLET ORAL at 02:10

## 2024-10-19 RX ADMIN — PROPRANOLOL HYDROCHLORIDE 20 MG: 20 TABLET ORAL at 09:10

## 2024-10-19 RX ADMIN — LORAZEPAM 0.5 MG: 0.5 TABLET ORAL at 08:10

## 2024-10-19 RX ADMIN — SUCRALFATE 1 G: 1 SUSPENSION ORAL at 06:10

## 2024-10-19 RX ADMIN — HYDROMORPHONE HYDROCHLORIDE 0.5 MG: 1 INJECTION, SOLUTION INTRAMUSCULAR; INTRAVENOUS; SUBCUTANEOUS at 01:10

## 2024-10-19 RX ADMIN — HYDROMORPHONE HYDROCHLORIDE 1 MG: 1 INJECTION, SOLUTION INTRAMUSCULAR; INTRAVENOUS; SUBCUTANEOUS at 06:10

## 2024-10-19 RX ADMIN — ALUMINUM HYDROXIDE, MAGNESIUM HYDROXIDE, AND SIMETHICONE 30 ML: 200; 200; 20 SUSPENSION ORAL at 11:10

## 2024-10-19 RX ADMIN — GABAPENTIN 300 MG: 300 CAPSULE ORAL at 09:10

## 2024-10-19 RX ADMIN — HYDROMORPHONE HYDROCHLORIDE 1 MG: 1 INJECTION, SOLUTION INTRAMUSCULAR; INTRAVENOUS; SUBCUTANEOUS at 04:10

## 2024-10-19 RX ADMIN — ALUMINUM HYDROXIDE, MAGNESIUM HYDROXIDE, AND SIMETHICONE 30 ML: 200; 200; 20 SUSPENSION ORAL at 05:10

## 2024-10-19 RX ADMIN — HYDROMORPHONE HYDROCHLORIDE 1 MG: 1 INJECTION, SOLUTION INTRAMUSCULAR; INTRAVENOUS; SUBCUTANEOUS at 01:10

## 2024-10-19 NOTE — PROGRESS NOTES
[facility]  Colorectal Surgery  Progress Note    Patient Name: Ivy Salgado  MRN: [id number]  Admission Date: 10/18/2024  Hospital Length of Stay: 0 days  Attending Physician: DUNCAN Campbell MD    Subjective:     Interval History: AF/HDS, having abdominal pain but manageable. Ostomy with scant output.     Post-Op Info:  * No surgery found *          Medications:  Continuous Infusions:   D5 and 0.9% NaCl   Intravenous Continuous 125 mL/hr at 10/19/24 0544 Rate Verify at 10/19/24 0544     Scheduled Meds:   aluminum-magnesium hydroxide-simethicone  30 mL Oral QID (AC & HS)    cyclobenzaprine  10 mg Oral QHS    enoxparin  40 mg Subcutaneous Daily    gabapentin  300 mg Oral BID    propranoloL  20 mg Oral BID    sucralfate  1 g Oral Q6H    tamsulosin  0.4 mg Oral Daily    valACYclovir  500 mg Oral Daily     PRN Meds:   aluminum-magnesium hydroxide-simethicone 200-200-20 mg/5 mL suspension 30 mL    cyclobenzaprine tablet 10 mg    enoxaparin injection 40 mg    gabapentin capsule 300 mg    propranoloL tablet 20 mg    sucralfate 100 mg/mL suspension 1 g    tamsulosin 24 hr capsule 0.4 mg    valACYclovir tablet 500 mg        Objective:     Vital Signs (Most Recent):  Temp: 97.6 °F (36.4 °C) (10/19/24 0728)  Pulse: 67 (10/19/24 0728)  Resp: 17 (10/19/24 0928)  BP: 104/72 (10/19/24 0728)  SpO2: 98 % (10/19/24 0900) Vital Signs (24h Range):  Temp:  [97.6 °F (36.4 °C)-98.5 °F (36.9 °C)] 97.6 °F (36.4 °C)  Pulse:  [66-87] 67  Resp:  [16-20] 17  SpO2:  [97 %-100 %] 98 %  BP: (104-149)/(61-86) 104/72     Intake/Output - Last 3 Shifts         10/17 0700  10/18 0659 10/18 0700  10/19 0659 10/19 0700  10/20 0659    I.V. (mL/kg)  591.9 (11.8)     Total Intake(mL/kg)  591.9 (11.8)     Net  +591.9                     Physical Exam  Constitutional:       Appearance: Normal appearance.   HENT:      Head: Normocephalic and atraumatic.   Eyes:      Extraocular Movements: Extraocular movements intact.   Cardiovascular:      Rate  and Rhythm: Normal rate.   Pulmonary:      Effort: Pulmonary effort is normal. No respiratory distress.   Abdominal:      General: There is distension.      Tenderness: There is abdominal tenderness. There is no guarding or rebound.   Skin:     General: Skin is warm and dry.   Neurological:      General: No focal deficit present.      Mental Status: She is alert and oriented to person, place, and time.            Significant Labs:  CBC (Last 3 Results):   Recent Labs   Lab 10/18/24  2130 10/19/24  0340   WBC 11.58 8.20   RBC 4.56 4.25   HGB 13.0 12.0   HCT 39.5 36.2*    282   MCV 87 85   MCH 28.5 28.2   MCHC 32.9 33.1     CMP (Last 3 Results):   Recent Labs   Lab 10/18/24  2130 10/19/24  0340    112*   CALCIUM 9.5 8.7   ALBUMIN 3.6  --    PROT 7.8  --     139   K 3.5 3.7   CO2 25 23    106   BUN 9 8   CREATININE 0.8 0.8   ALKPHOS 150  --    ALT 32  --    AST 48*  --    BILITOT 0.3  --        Significant Diagnostics:  I have reviewed all pertinent imaging results/findings within the past 24 hours.  Assessment/Plan:     * Small bowel obstruction  Ms. Salgado is a 44 y.o F who presented with small bowel obstruction and was admitted to the colorectal surgery service due to history of Crohn's.    -NPO, patient declined NGT  -F/U with IBD GI consult, appreciate recs.  -Ativan for antiemesis, has hx prolonged QT  -serial abdominal exams  -await return of bowel function  -dvt ppx  -mIVF  -Discussed with attending, Dr. Shannan Wing MD  Colorectal Surgery  [facility]

## 2024-10-19 NOTE — ASSESSMENT & PLAN NOTE
Ms. Salgado is a 44 y.o F who presented with small bowel obstruction and was admitted to the colorectal surgery service due to history of Crohn's.    -NPO, patient declined NGT  -F/U with IBD GI consult, appreciate recs.  -Ativan for antiemesis, has hx prolonged QT  -serial abdominal exams  -await return of bowel function  -dvt ppx  -mIVF  -Discussed with attending, Dr. Campbell

## 2024-10-19 NOTE — PLAN OF CARE
[facility]  Discharge Assessment    Primary Care Provider: Luis Madden DO     Discharge Assessment (most recent)       BRIEF DISCHARGE ASSESSMENT - 10/19/24 1145          Discharge Planning    Assessment Type Discharge Planning Brief Assessment     Resource/Environmental Concerns none     Support Systems Family members;Friends/neighbors;Parent     Assistance Needed none     Equipment Currently Used at Home colostomy/ostomy supplies     Current Living Arrangements home     Care Facility Name N/a     Patient/Family Anticipates Transition to home with family     Patient/Family Anticipated Services at Transition none     DME Needed Upon Discharge  none     Discharge Plan A Home with family;Home     Discharge Plan B Home with family                       SW spoke with patient in 1046 for Discharge Planning Assessment. Per patient, Pt lives daughter in a single family home on a slab foundation with zero steps to porch and point of entry.  Patient was independent with ADLS and DID use DME or in-home assistive equipment. Pt is not on dialysis  or coumadin,  takes medications as prescribed / keeps refilled / has resources for all daily and prescriptive needs. Preferred pharmacy is [facility] - Agreeable to bedside delivery.  Will have help from daughter and other immediate family upon discharge - Daughter to provide transportation home.  All questions addressed. Unit and SW direct numbers provided. Will continue to follow for course of hospitalization    10/18/2024  8:20 PM  Abdominal pain, unspecified abdominal location [R10.9]    PCP: Luis Madden DO    PHARMACY:   [facility] [address] [address]  Phone: [phone] Fax: [phone]    [facility] [address] [address]  Phone: [phone] Fax: [phone]      Payor: BLUE CROSS [facility] EMPLOYEE BENEFIT / Plan: [employer] EMPLOY[employer] BLUE [employer] [place] / Product  Type: Self Funded /       Gill Platt, MACIELSW  - [facility]

## 2024-10-19 NOTE — SUBJECTIVE & OBJECTIVE
Subjective:     Interval History: AF/HDS, having abdominal pain but manageable. Ostomy with scant output.     Post-Op Info:  * No surgery found *          Medications:  Continuous Infusions:   D5 and 0.9% NaCl   Intravenous Continuous 125 mL/hr at 10/19/24 0544 Rate Verify at 10/19/24 0544     Scheduled Meds:   aluminum-magnesium hydroxide-simethicone  30 mL Oral QID (AC & HS)    cyclobenzaprine  10 mg Oral QHS    enoxparin  40 mg Subcutaneous Daily    gabapentin  300 mg Oral BID    propranoloL  20 mg Oral BID    sucralfate  1 g Oral Q6H    tamsulosin  0.4 mg Oral Daily    valACYclovir  500 mg Oral Daily     PRN Meds:   aluminum-magnesium hydroxide-simethicone 200-200-20 mg/5 mL suspension 30 mL    cyclobenzaprine tablet 10 mg    enoxaparin injection 40 mg    gabapentin capsule 300 mg    propranoloL tablet 20 mg    sucralfate 100 mg/mL suspension 1 g    tamsulosin 24 hr capsule 0.4 mg    valACYclovir tablet 500 mg        Objective:     Vital Signs (Most Recent):  Temp: 97.6 °F (36.4 °C) (10/19/24 0728)  Pulse: 67 (10/19/24 0728)  Resp: 17 (10/19/24 0928)  BP: 104/72 (10/19/24 0728)  SpO2: 98 % (10/19/24 0900) Vital Signs (24h Range):  Temp:  [97.6 °F (36.4 °C)-98.5 °F (36.9 °C)] 97.6 °F (36.4 °C)  Pulse:  [66-87] 67  Resp:  [16-20] 17  SpO2:  [97 %-100 %] 98 %  BP: (104-149)/(61-86) 104/72     Intake/Output - Last 3 Shifts         10/17 0700  10/18 0659 10/18 0700  10/19 0659 10/19 0700  10/20 0659    I.V. (mL/kg)  591.9 (11.8)     Total Intake(mL/kg)  591.9 (11.8)     Net  +591.9                     Physical Exam  Constitutional:       Appearance: Normal appearance.   HENT:      Head: Normocephalic and atraumatic.   Eyes:      Extraocular Movements: Extraocular movements intact.   Cardiovascular:      Rate and Rhythm: Normal rate.   Pulmonary:      Effort: Pulmonary effort is normal. No respiratory distress.   Abdominal:      General: There is distension.      Tenderness: There is abdominal tenderness. There is  no guarding or rebound.   Skin:     General: Skin is warm and dry.   Neurological:      General: No focal deficit present.      Mental Status: She is alert and oriented to person, place, and time.            Significant Labs:  CBC (Last 3 Results):   Recent Labs   Lab 10/18/24  2130 10/19/24  0340   WBC 11.58 8.20   RBC 4.56 4.25   HGB 13.0 12.0   HCT 39.5 36.2*    282   MCV 87 85   MCH 28.5 28.2   MCHC 32.9 33.1     CMP (Last 3 Results):   Recent Labs   Lab 10/18/24  2130 10/19/24  0340    112*   CALCIUM 9.5 8.7   ALBUMIN 3.6  --    PROT 7.8  --     139   K 3.5 3.7   CO2 25 23    106   BUN 9 8   CREATININE 0.8 0.8   ALKPHOS 150  --    ALT 32  --    AST 48*  --    BILITOT 0.3  --        Significant Diagnostics:  I have reviewed all pertinent imaging results/findings within the past 24 hours.

## 2024-10-19 NOTE — ED PROVIDER NOTES
Encounter Date: 10/18/2024       History     Chief Complaint   Patient presents with    Flank Pain     Pt c/o right flank pain, N/V, decreased appetite and decreased ostomy output x3 days. Hx of crohn disease.     Ivy Salgado is a 44 y.o. female with a PMHx of recurrent UTIs, Ileostomy in place (07/09/2012), Crohn's disease, GERD, HTN, long QT syndrome, pancreas cyst, recurrent c. diff presents to the ED for abdominal pain, nausea, decreased colostomy output. She was last seen on 10/09/24 for similar complaint. CT of her abdomen showed nothing acute and labs were unremarkable. She was treated with IV fentanyl and fluids with improvement. Symptoms are much worse today and patient is moaning in bed and crying from the pain. Patient states that she feels like her colostomy bag hasn't been emptying. Denies dark stools. She saw colorectal surgery for her bowel obstruction on 4/2024.           Review of patient's allergies indicates:   Allergen Reactions    Azathioprine sodium Other (See Comments)     Other reaction(s): pancreatitis  Other reaction(s): pancreatitis    Methotrexate analogues Other (See Comments)     leukopenia    Stelara [ustekinumab] Other (See Comments)     Multiple infections    Zofran [ondansetron hcl (pf)] Other (See Comments)     Per patient causes prolong QT    Vancomycin analogues Other (See Comments)     Made her red    Azathioprine      Other reaction(s): Unknown    Methotrexate      Other reaction(s): infection-    Morphine Itching and Other (See Comments)     Other reaction(s): Itching    Zofran [ondansetron hcl]      Other reaction(s): Hives    Bactrim [sulfamethoxazole-trimethoprim] Palpitations    Ciprofloxacin Palpitations     Past Medical History:   Diagnosis Date    Abnormal Pap smear 2007    Alkaline phosphatase elevation- based on lab from 4/9/2019 05/28/2019    Arthritis     Avascular necrosis of bone of hip, left     Bacterial vaginosis     Crohn disease     Depression  08/05/2017    Drug-induced pancreatitis (imuran)     Encounter for blood transfusion     Generalized anxiety disorder     Genital HSV     GERD (gastroesophageal reflux disease)     Hypertension     Ileostomy in place 07/09/2012    Kidney stone     Long QT syndrome     Melanoma in situ (excised-buttocks 2018, para-stomal site 2019)     Moderate episode of recurrent major depressive disorder 02/02/2024    Multiple thyroid nodules 11/27/2018    Osteopenia of multiple sites 01/07/2019    Pancreas cyst (tail, 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)     Recurrent Clostridioides difficile diarrhea     Recurrent UTI 04/03/2013     Past Surgical History:   Procedure Laterality Date    ABDOMINAL SURGERY      APPENDECTOMY      ARTHROPLASTY OF SHOULDER Left 7/18/2023    Procedure: ARTHROPLASTY, SHOULDER;  Surgeon: Sharon Babb MD;  Location: [facility] OR;  Service: Orthopedics;  Laterality: Left;    AUGMENTATION OF BREAST Bilateral 06/2022    gel implants    BILATERAL SALPINGO-OOPHORECTOMY (BSO) Bilateral 05/30/2019    Procedure: SALPINGO-OOPHORECTOMY, BILATERAL;  Surgeon: Rupa German MD;  Location: [address];  Service: OB/GYN;  Laterality: Bilateral;    BLADDER SURGERY      partial cystectomy due to fistula    breast lift      BREAST SURGERY      [facility]      COLON SURGERY      COLONOSCOPY      CYSTOSCOPY  09/23/2020    Procedure: CYSTOSCOPY;  Surgeon: Sascha Florentino MD;  Location: [address];  Service: Urology;;    CYSTOSCOPY W/ URETERAL STENT PLACEMENT Left 09/15/2020    Procedure: CYSTOSCOPY, WITH URETERAL STENT INSERTION;  Surgeon: Sascha Florentino MD;  [facility];  Service: Urology;  Laterality: Left;    DIAGNOSTIC LAPAROSCOPY N/A 07/09/2020    Procedure: LAPAROSCOPY, DIAGNOSTIC;  Surgeon: Gilson El MD;  Location: [facility] OR;  Service: OB/GYN;  Laterality: N/A;    ESOPHAGOGASTRODUODENOSCOPY N/A 1/3/2024    Procedure: EGD (ESOPHAGOGASTRODUODENOSCOPY);  Surgeon: Lily Lind MD;  Location: [address]  [facility]);  Service: Endoscopy;  Laterality: N/A;    EXCISION OF MELANOMA  07/17/2019    ILEOSCOPY N/A 1/3/2024    Procedure: ILEOSCOPY;  Surgeon: Lily Lind MD;  Location: [facility] ENDO (2ND FLR);  Service: Endoscopy;  Laterality: N/A;    ILEOSCOPY N/A 1/24/2024    Procedure: ILEOSCOPY;  Surgeon: Lilian Navarro MD;  Location: [facility] ENDO (2ND FLR);  Service: Endoscopy;  Laterality: N/A;  through stoma    ILEOSCOPY N/A 6/4/2024    Procedure: ILEOSCOPY;  Surgeon: Peace Garcia MD;  Location: [facility] ENDO (4TH FLR);  Service: Endoscopy;  Laterality: N/A;  Ref By:,kaleb sent via portal,AC  received urgent message to reschedule pt from 7/15  to may or june-updated prep instructions sent-   5/29/24- precall complete - ERW    ILEOSTOMY      INSERTION OF TUNNELED CENTRAL VENOUS CATHETER (CVC) WITH SUBCUTANEOUS PORT Right 9/10/2024    Procedure: INSERTION, SINGLE LUMEN CATHETER WITH PORT, WITH FLUOROSCOPIC GUIDANCE, Rt neck or chest, prefers chest;  Surgeon: Vinh Lloyd MD;  Location: [facility] OR 2ND FLR;  Service: General;  Laterality: Right;    LAPAROSCOPIC LYSIS OF ADHESIONS N/A 07/09/2020    Procedure: LYSIS, ADHESIONS, LAPAROSCOPIC;  Surgeon: Gilson El MD;  Location: [facility] OR;  Service: OB/GYN;  Laterality: N/A;    LASER LITHOTRIPSY  09/23/2020    Procedure: LITHOTRIPSY, USING LASER;  Surgeon: Sascha Florentino MD;  Location: [facility] OR 1ST FLR;  Service: Urology;;    LYSIS OF ADHESIONS N/A 05/30/2019    Procedure: LYSIS, ADHESIONS;  Surgeon: Rupa German MD;  Location: [facility] OR 2ND FLR;  Service: OB/GYN;  Laterality: N/A;    MEDIPORT REMOVAL Left 9/10/2024    Procedure: REMOVAL, CATHETER, CENTRAL VENOUS, TUNNELED, WITH PORT, Left side;  Surgeon: Vinh Lloyd MD;  Location: [facility] OR 2ND FLR;  Service: General;  Laterality: Left;    OOPHORECTOMY Right 04/16/2015    PORTACATH PLACEMENT  02/21/2017    SKIN BIOPSY      SMALL INTESTINE SURGERY      age 18 Y    TOTAL ABDOMINAL HYSTERECTOMY  04/16/2015     TOTAL COLECTOMY      TUBAL LIGATION  06/06/2012    UPPER GASTROINTESTINAL ENDOSCOPY      URETEROSCOPIC REMOVAL OF URETERIC CALCULUS  09/23/2020    Procedure: REMOVAL, CALCULUS, URETER, URETEROSCOPIC;  Surgeon: Sascha Florentino MD;  [facility];  Service: Urology;;    URETEROSCOPY Left 09/23/2020    Procedure: URETEROSCOPY;  Surgeon: Sascha Florentino MD;  [facility];  Service: Urology;  Laterality: Left;     Family History   Problem Relation Name Age of Onset    Diabetes Paternal Grandfather Stephen     Hearing loss Paternal Grandmother Viviane     Cancer Maternal Grandfather Philippe         Skin    Skin cancer Maternal Grandfather Philippe     Diabetes Maternal Grandfather Philippe     Heart disease Maternal Grandfather Philippe     Colon cancer Father Milan     Cancer Father Milan         Colon    Liver cancer Father Milan     Hyperlipidemia Father Milan     Hypertension Mother Shaila     Crohn's disease Brother      Endometrial cancer Maternal Aunt      Breast cancer Maternal Cousin  41    Crohn's disease Daughter      Ovarian cancer Neg Hx      Melanoma Neg Hx       Social History     Tobacco Use    Smoking status: Never    Smokeless tobacco: Never   Substance Use Topics    Alcohol use: Not Currently     Alcohol/week: 0.0 standard drinks of alcohol    Drug use: No     Review of Systems   All other systems reviewed and are negative.  Refer to HPI    Physical Exam     Initial Vitals [10/18/24 2010]   BP Pulse Resp Temp SpO2   (!) 149/86 87 18 98.2 °F (36.8 °C) 98 %      MAP       --         Physical Exam    Nursing note and vitals reviewed.  Constitutional: She appears well-developed and well-nourished. She appears distressed.   HENT:   Head: Normocephalic and atraumatic.   Cardiovascular:  Normal rate, regular rhythm, normal heart sounds and intact distal pulses.     Exam reveals no gallop and no friction rub.       No murmur heard.  Pulmonary/Chest: Breath sounds normal.  No respiratory distress. She has no wheezes. She has no rhonchi. She has no rales. She exhibits no tenderness.   Abdominal: There is abdominal tenderness.     Neurological: She is alert and oriented to person, place, and time.         ED Course   Procedures  Labs Reviewed   CBC W/ AUTO DIFFERENTIAL - Abnormal       Result Value    WBC 11.58      RBC 4.56      Hemoglobin 13.0      Hematocrit 39.5      MCV 87      MCH 28.5      MCHC 32.9      RDW 14.0      Platelets 322      MPV 10.0      Immature Granulocytes 0.3      Gran # (ANC) 9.3 (*)     Immature Grans (Abs) 0.03      Lymph # 1.4      Mono # 0.8      Eos # 0.1      Baso # 0.03      nRBC 0      Gran % 80.1 (*)     Lymph % 11.9 (*)     Mono % 7.0      Eosinophil % 0.4      Basophil % 0.3      Differential Method Automated     COMPREHENSIVE METABOLIC PANEL - Abnormal    Sodium 141      Potassium 3.5      Chloride 103      CO2 25      Glucose 110      BUN 9      Creatinine 0.8      Calcium 9.5      Total Protein 7.8      Albumin 3.6      Total Bilirubin 0.3      Alkaline Phosphatase 150      AST 48 (*)     ALT 32      eGFR >60.0      Anion Gap 13     URINALYSIS, REFLEX TO URINE CULTURE - Abnormal    Specimen UA Urine, Clean Catch      Color, UA Yellow      Appearance, UA Clear      pH, UA 8.0      Specific Gravity, UA >1.030 (*)     Protein, UA 1+ (*)     Glucose, UA Negative      Ketones, UA Negative      Bilirubin (UA) Negative      Occult Blood UA Negative      Nitrite, UA Negative      Leukocytes, UA Negative      Narrative:     Specimen Source->Urine   URINALYSIS MICROSCOPIC - Abnormal    RBC, UA 2      WBC, UA 7 (*)     Bacteria Few (*)     Squam Epithel, UA 12      Hyaline Casts, UA 0      Microscopic Comment SEE COMMENT      Narrative:     Specimen Source->Urine   LIPASE    Lipase 33     LACTIC ACID, PLASMA    Lactate (Lactic Acid) 1.8     C-REACTIVE PROTEIN   C-REACTIVE PROTEIN    CRP 3.2      Narrative:     Add on CRP per Dr. Kirby @ 00:13 am to order #  [id number]          Imaging Results              X-Ray Abdomen AP 1 View (Final result)  Result time 10/18/24 23:47:16      Final result by Frank Linares DO (10/18/24 23:47:16)                   Impression:      Continued prominent loops of small bowel in the right hemiabdomen, similar to prior, bowel obstruction not excluded.      Electronically signed by: Frank Linares  Date:    10/18/2024  Time:    23:47               Narrative:    EXAMINATION:  XR ABDOMEN AP 1 VIEW    CLINICAL HISTORY:  severe abd pain;    TECHNIQUE:  AP View(s) of the abdomen was performed.    COMPARISON:  CT abdomen and pelvis from 10/18/2024.  Abdominal radiograph from 09/17/2020.    FINDINGS:  There are continued prominent loops of bowel in the right hemiabdomen, similar to the prior study.  There is no free air on this supine view.  There is contrast in the bilateral renal collecting systems and ureters, and the urinary bladder.  Again seen is an osseous density projecting over the right iliac wing, similar to prior, corresponding to the soft tissue calcification seen on prior cross-sectional imaging.  Remaining osseous structures are unremarkable.                                        CT Abdomen Pelvis With IV Contrast Routine Oral Contrast (Final result)  Result time 10/18/24 23:28:54      Final result by Damián Cloud MD (10/18/24 23:28:54)                   Impression:      Abdomen CT and Pelvis CT:    History of Crohn's disease status post colectomy with end ileostomy.    Multiple fluid-air filled loops of dilated small bowel in the right hemiabdomen and pelvis with suspected narrowing at a surgical anastomosis.  No definite transition point elsewhere.  Findings may represent small bowel obstruction.  Correlate clinically.    A 4 cm segment of the right lower quadrant small bowel demonstrates findings suspicious for active segmental enteritis and/or small bowel stricture.  Other underlying pathology not fully excluded.   Correlate clinically, and with follow-up imaging.    No free intraperitoneal air.    No intraperitoneal abscess.    Other findings as detailed above.    This report was flagged in Epic as abnormal.    Damián Cloud MD, first observed the critical findings on 10/18/2024 at 23:20, and sent the results to Lobo Hicks MD, and Shelby Quinonez MD, via Epic Secure Chat message, on 10/18/2024 at 23:27.  Patient name and medical record number were specified/linked in the message.Each recipient immediately responded, acknowledging receipt of the information.    Electronically signed by resident: Dajuan Landry  Date:    10/18/2024  Time:    22:17    Electronically signed by: Damián Cloud  Date:    10/18/2024  Time:    23:28               Narrative:    EXAMINATION:  CT ABDOMEN PELVIS WITH IV CONTRAST    CLINICAL HISTORY:  Abdominal pain, acute, nonlocalized;    TECHNIQUE:  Axial images of the abdomen and pelvis were acquired  after the use of 75 mL Omnipaque 350 IV contrast. No oral contrast was administered.  Coronal and sagittal reconstructions were also obtained.    COMPARISON:  CT abdomen pelvis 10/09/2024.  MRI enterography 05/17/2024.    FINDINGS:  LUNG BASES: Partially imaged heart and pericardium are within normal limits.  Lung bases are clear.    HEPATOBILIARY: Liver is normal in size.  No focal liver lesions.  Normal gallbladder.  No bile duct dilatation.    SPLEEN: No splenomegaly.    PANCREAS: No focal masses or ductal dilatation.    ADRENALS: No adrenal nodules.    KIDNEYS/URETERS: Kidneys enhance symmetrically.  Few small bilateral simple renal cysts.  No hydronephrosis or renal stones.  No solid mass lesions.    BLADDER/PELVIC ORGANS: No bladder wall thickening.  Uterus is surgically absent.  No adnexal lesions.    PERITONEUM / RETROPERITONEUM: No free intraperitoneal air or discrete rim enhancing intraperitoneal abscess.    Trace interloop fluid adjacent to some of the dilated small bowel.    LYMPH  NODES: No lymphadenopathy.    VESSELS: Unremarkable.    GI TRACT: Postsurgical change of colectomy with end ileostomy.  There are multiple fluid-air filled loops of mildly dilated small bowel in the right hemiabdomen and pelvis (measuring up to 3.6 cm in diameter).  No definite transition point identified.    There is suspected narrowing at an intestinal surgical anastomosis (coronal series 601, image 78) with relatively decompressed downstream small bowel.  No associated bowel wall thickening or pneumatosis in this region.    There is an approximately 4 cm long segment of small bowel luminal narrowing, mucosal/mural hyperenhancement, and mucosal/mural thickening) in the right lower quadrant (series 601, image 66; series 3, images 337-386).  There is minimal stranding in the subtending mesentery.  This could represent active enteritis, and/or a small bowel stricture.    SOFT TISSUES: Bilateral breast augmentation.  Focal area of soft tissue attenuation with partial calcification in the right gluteal soft tissues, possibly iatrogenic or sequela of remote trauma.    BONES: No fractures or focal osseous lesions.                                       Medications   LORazepam tablet 0.5 mg (0.5 mg Oral Given 10/20/24 0909)   cyclobenzaprine tablet 10 mg (10 mg Oral Not Given 10/19/24 2159)   gabapentin capsule 300 mg (300 mg Oral Given 10/20/24 0844)   propranoloL tablet 20 mg (20 mg Oral Not Given 10/20/24 0900)   sucralfate 100 mg/mL suspension 1 g (1 g Oral Not Given 10/20/24 1200)   aluminum-magnesium hydroxide-simethicone 200-200-20 mg/5 mL suspension 30 mL (30 mLs Oral Not Given 10/20/24 1100)   tamsulosin 24 hr capsule 0.4 mg (0.4 mg Oral Not Given 10/20/24 0900)   valACYclovir tablet 500 mg (500 mg Oral Given 10/20/24 0844)   sodium chloride 0.9% flush 10 mL (has no administration in time range)   naloxone 0.4 mg/mL injection 0.02 mg (has no administration in time range)   dextrose 5 % and 0.9 % NaCl infusion (  Intravenous Verify Only 10/20/24 0604)   enoxaparin injection 40 mg (40 mg Subcutaneous Given 10/19/24 1700)   HYDROmorphone injection 0.5 mg (0.5 mg Intravenous Given 10/19/24 0101)   HYDROmorphone injection 1 mg (1 mg Intravenous Given 10/20/24 1313)   methylPREDNISolone sodium succinate injection 20 mg (20 mg Intravenous Not Given 10/20/24 1100)   methylPREDNISolone sodium succinate injection 20 mg (20 mg Intravenous Not Given 10/19/24 2100)   promethazine (PHENERGAN) 12.5 mg in 0.9% NaCl 50 mL IVPB (0 mg Intravenous Stopped 10/19/24 2224)   HYDROmorphone injection 1 mg (1 mg Intravenous Given 10/18/24 2123)   HYDROmorphone injection 1 mg (1 mg Intravenous Given 10/18/24 2130)   iohexoL (OMNIPAQUE 350) injection 75 mL (75 mLs Intravenous Given 10/18/24 2136)   HYDROmorphone injection 1 mg (1 mg Intravenous Given 10/18/24 2323)   potassium chloride 10 mEq in 100 mL IVPB (10 mEq Intravenous New Bag 10/20/24 1319)     Medical Decision Making  Ivy Salgado is a 44 y.o. female with a PMHx of recurrent UTIs, Ileostomy in place (07/09/2012), Crohn's disease, GERD, HTN, long QT syndrome, pancreas cyst, recurrent c. diff presents to the ED for abdominal pain, nausea, decreased colostomy output. She was last seen on 10/09/24 for similar complaint. She improved and was discharged in stable condition. Symptoms are much worse today and patient is moaning in bed and crying from the pain. Patient states that she feels like her colostomy bag hasn't been emptying. Denies dark stools. Differential diagnosis includes: SBO, bowel perforation, pancreatitis. CBC, CMP, lactate, lipase, UA ordered. STAT CT abdomen with contrast shows concern for SBO. CXR showed prominent loops of small bowel in the right hemiabdomen. Colorectal surgery consulted and plan discussed. Patient refused NG tube placement. She will be admitted to colorectal surgery for SBO management. During the ED stay, patient was transferred to the care of Dr. Quinonez.        Amount and/or Complexity of Data Reviewed  Labs: ordered.  Radiology: ordered and independent interpretation performed. Decision-making details documented in ED Course.     Details: Per my independent interpretation, diffuse air fluid levels    Risk  Prescription drug management.  Decision regarding hospitalization.              Attending Attestation:   Physician Attestation Statement for Resident:  As the supervising MD   Physician Attestation Statement: I have personally seen and examined this patient.   I agree with the above history.  -:   As the supervising MD I agree with the above PE.     As the supervising MD I agree with the above treatment, course, plan, and disposition.                    ED Course as of 10/20/24 1549   Fri Oct 18, 2024   2338 CT Abdomen Pelvis With IV Contrast Routine Oral Contrast(!)  CT concerning for SBO [MA]   2339 Patient refused NG tube placement   [MA]      ED Course User Index  [MA] Lobo Hicks MD                           Clinical Impression:  Final diagnoses:  [R10.9] Abdominal pain, unspecified abdominal location  [K56.609] SBO (small bowel obstruction) (Primary)          ED Disposition Condition    Admit                 Lobo Hicks MD  Resident  10/19/24 0050       Risa Holbrook MD  10/20/24 4508

## 2024-10-19 NOTE — CONSULTS
[facility] - Emergency Dept  Colorectal Surgery  Consult Note    Patient Name: Ivy Salgado  MRN: [id number]  Admission Date: 10/18/2024  Hospital Length of Stay: 0 days  Attending Physician: No att. providers found  Primary Care Provider: Luis Madden DO    Inpatient consult to Colorectal Surgery  Consult performed by: Sajan Kirby MD  Consult ordered by: Sajan Kirby MD        Subjective:     Chief Complaint/Reason for Admission: Abdominal Pain    History of Present Illness: 44F hx Crohn's s/p TAC end ileostomy and multiple SBOs in the past p/w 2 days of progressive abdominal pain, nausea, vomiting, and decreased stoma output. She states it feels like previous SBOs. In the ED, workup revealed an SBO without discreet transition point but dilated proximal and decompressed distal small bowel. For this, CRS was called for further management.    Currently, the patient is resting comfortably after 3 mg Dilaudid. She states she thinks her stoma has put out a small amount of gas in the last couple of hours. She last vomited 2 hours ago. She is refusing an NGT and understands that vomiting will likely occur and there is a risk of aspiration, which she accepts.       Current Facility-Administered Medications   Medication    aluminum-magnesium hydroxide-simethicone 200-200-20 mg/5 mL suspension 30 mL    cyclobenzaprine tablet 10 mg    dextrose 5 % and 0.9 % NaCl infusion    enoxaparin injection 40 mg    gabapentin capsule 300 mg    HYDROmorphone injection 0.5 mg    HYDROmorphone injection 1 mg    LORazepam tablet 0.5 mg    naloxone 0.4 mg/mL injection 0.02 mg    propranoloL tablet 20 mg    sodium chloride 0.9% flush 10 mL    sucralfate 100 mg/mL suspension 1 g    tamsulosin 24 hr capsule 0.4 mg    valACYclovir tablet 500 mg     Current Outpatient Medications   Medication Sig    clonazePAM (KLONOPIN) 1 MG tablet Take 1 tablet (1 mg total) by mouth 2 (two) times daily.    cyclobenzaprine (FLEXERIL) 10 MG tablet  Take 1 tablet (10 mg total) by mouth every evening as needed for muscle pain    diphenoxylate-atropine 2.5-0.025 mg/5 ml (LOMOTIL) 2.5-0.025 mg/5 mL liquid Take 2.5-5 ml up to 4 times a day - dose might change at visit on 1/30    droNABinol (MARINOL) 10 MG capsule Take 1 capsule (10 mg total) by mouth once daily.    estradiol 0.025 mg/24 hr 0.025 mg/24 hr Place 1 patch onto the skin once a week. (Patient taking differently: Place 1 patch onto the skin once a week. CHANGES ON MONDAYS)    gabapentin (NEURONTIN) 300 MG capsule Take 1 capsule (300 mg total) by mouth 2 (two) times daily.    loperamide (IMODIUM) 1 mg/7.5 mL solution Take 4 mg by mouth 3 (three) times daily as needed for Diarrhea.    mirtazapine (REMERON SOL-TAB) 30 MG disintegrating tablet Take 1 tablet (30 mg total) by mouth nightly.    multivitamin (THERAGRAN) per tablet Take 1 tablet by mouth once daily.    nadoloL (CORGARD) 40 MG tablet Take 1 tablet (40 mg total) by mouth once daily. Patient needs appointment before next refill.    oxyCODONE (OXY-IR) 5 mg Cap Take 1 capsule (5 mg total) by mouth every 4 (four) hours as needed for Pain.    pantoprazole (PROTONIX) 40 MG tablet Take 1 tablet (40 mg total) by mouth 2 (two) times daily.    promethazine (PHENERGAN) 25 MG tablet Take 1 tablet (25 mg total) by mouth every 6 (six) hours as needed for Nausea.    tamsulosin (FLOMAX) 0.4 mg Cap Take 1 capsule (0.4 mg total) by mouth once daily.    valACYclovir (VALTREX) 500 MG tablet Take 1 tablet (500 mg total) by mouth once daily.    vedolizumab (ENTYVIO) 300 mg SolR injection Inject 300 mg into the vein every 8 weeks.    zolpidem (AMBIEN) 10 mg Tab Take 1 tablet (10 mg total) by mouth every evening.       Review of patient's allergies indicates:   Allergen Reactions    Azathioprine sodium Other (See Comments)     Other reaction(s): pancreatitis  Other reaction(s): pancreatitis    Methotrexate analogues Other (See Comments)     leukopenia    Stelara  [ustekinumab] Other (See Comments)     Multiple infections    Zofran [ondansetron hcl (pf)] Other (See Comments)     Per patient causes prolong QT    Vancomycin analogues Other (See Comments)     Made her red    Azathioprine      Other reaction(s): Unknown    Methotrexate      Other reaction(s): infection-    Morphine Itching and Other (See Comments)     Other reaction(s): Itching    Zofran [ondansetron hcl]      Other reaction(s): Hives    Bactrim [sulfamethoxazole-trimethoprim] Palpitations    Ciprofloxacin Palpitations       Past Medical History:   Diagnosis Date    Abnormal Pap smear 2007    Alkaline phosphatase elevation- based on lab from 4/9/2019 05/28/2019    Arthritis     Avascular necrosis of bone of hip, left     Bacterial vaginosis     Crohn disease     Depression 08/05/2017    Drug-induced pancreatitis (imuran)     Encounter for blood transfusion     Generalized anxiety disorder     Genital HSV     GERD (gastroesophageal reflux disease)     Hypertension     Ileostomy in place 07/09/2012    Kidney stone     Long QT syndrome     Melanoma in situ (excised-buttocks 2018, para-stomal site 2019)     Moderate episode of recurrent major depressive disorder 02/02/2024    Multiple thyroid nodules 11/27/2018    Osteopenia of multiple sites 01/07/2019    Pancreas cyst (tail, 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)     Recurrent Clostridioides difficile diarrhea     Recurrent UTI 04/03/2013     Past Surgical History:   Procedure Laterality Date    ABDOMINAL SURGERY      APPENDECTOMY      ARTHROPLASTY OF SHOULDER Left 7/18/2023    Procedure: ARTHROPLASTY, SHOULDER;  Surgeon: Sharon Babb MD;  Location: [facility];  Service: Orthopedics;  Laterality: Left;    AUGMENTATION OF BREAST Bilateral 06/2022    gel implants    BILATERAL SALPINGO-OOPHORECTOMY (BSO) Bilateral 05/30/2019    Procedure: SALPINGO-OOPHORECTOMY, BILATERAL;  Surgeon: Rupa German MD;  Location: [address];  Service: OB/GYN;  Laterality: Bilateral;    BLADDER SURGERY       partial cystectomy due to fistula    breast lift      BREAST SURGERY      [facility]      COLON SURGERY      COLONOSCOPY      CYSTOSCOPY  09/23/2020    Procedure: CYSTOSCOPY;  Surgeon: Sascha Florentino MD;  Location: [facility] OR 1ST FLR;  Service: Urology;;    CYSTOSCOPY W/ URETERAL STENT PLACEMENT Left 09/15/2020    Procedure: CYSTOSCOPY, WITH URETERAL STENT INSERTION;  Surgeon: Sascha Florentino MD;  Location: [facility] OR 1ST FLR;  Service: Urology;  Laterality: Left;    DIAGNOSTIC LAPAROSCOPY N/A 07/09/2020    Procedure: LAPAROSCOPY, DIAGNOSTIC;  Surgeon: Gilson El MD;  Location: [facility] OR;  Service: OB/GYN;  Laterality: N/A;    ESOPHAGOGASTRODUODENOSCOPY N/A 1/3/2024    Procedure: EGD (ESOPHAGOGASTRODUODENOSCOPY);  Surgeon: Lily Lind MD;  Location: [facility] ENDO (2ND FLR);  Service: Endoscopy;  Laterality: N/A;    EXCISION OF MELANOMA  07/17/2019    ILEOSCOPY N/A 1/3/2024    Procedure: ILEOSCOPY;  Surgeon: Lily Lind MD;  Location: [facility] ENDO (2ND FLR);  Service: Endoscopy;  Laterality: N/A;    ILEOSCOPY N/A 1/24/2024    Procedure: ILEOSCOPY;  Surgeon: Lilian Navarro MD;  Location: [facility] ENDO (2ND FLR);  Service: Endoscopy;  Laterality: N/A;  through stoma    ILEOSCOPY N/A 6/4/2024    Procedure: ILEOSCOPY;  Surgeon: Peace Garcia MD;  Location: [facility] ENDO (4TH FLR);  Service: Endoscopy;  Laterality: N/A;  Ref By:,kaleb sent via portal,AC  received urgent message to reschedule pt from 7/15  to may or june-updated prep instructions sent-   5/29/24- precall complete - ERW    ILEOSTOMY      INSERTION OF TUNNELED CENTRAL VENOUS CATHETER (CVC) WITH SUBCUTANEOUS PORT Right 9/10/2024    Procedure: INSERTION, SINGLE LUMEN CATHETER WITH PORT, WITH FLUOROSCOPIC GUIDANCE, Rt neck or chest, prefers chest;  Surgeon: Vinh Lloyd MD;  Location: [facility] OR 2ND FLR;  Service: General;  Laterality: Right;    LAPAROSCOPIC LYSIS OF ADHESIONS N/A 07/09/2020    Procedure: LYSIS, ADHESIONS, LAPAROSCOPIC;   Surgeon: Gilson El MD;  Location: [facility] OR;  Service: OB/GYN;  Laterality: N/A;    LASER LITHOTRIPSY  09/23/2020    Procedure: LITHOTRIPSY, USING LASER;  Surgeon: Sascha Florentino MD;  Location: [facility] OR [address] [place] [address];  Service: Urology;;    LYSIS OF ADHESIONS N/A 05/30/2019    Procedure: LYSIS, ADHESIONS;  Surgeon: Rupa German MD;  [facility];  Service: OB/GYN;  Laterality: N/A;    MEDIPORT REMOVAL Left 9/10/2024    Procedure: REMOVAL, CATHETER, CENTRAL VENOUS, TUNNELED, WITH PORT, Left side;  Surgeon: Vinh Lloyd MD;  Location: [facility] OR [address] [place] [address];  Service: General;  Laterality: Left;    OOPHORECTOMY Right 04/16/2015    PORTACATH PLACEMENT  02/21/2017    SKIN BIOPSY      SMALL INTESTINE SURGERY      age 18 Y    TOTAL ABDOMINAL HYSTERECTOMY  04/16/2015    TOTAL COLECTOMY      TUBAL LIGATION  06/06/2012    UPPER GASTROINTESTINAL ENDOSCOPY      URETEROSCOPIC REMOVAL OF URETERIC CALCULUS  09/23/2020    Procedure: REMOVAL, CALCULUS, URETER, URETEROSCOPIC;  Surgeon: Sascha Florentino MD;  Location: [facility] OR [address] [place] [address];  Service: Urology;;    URETEROSCOPY Left 09/23/2020    Procedure: URETEROSCOPY;  Surgeon: Sascha Florentino MD;  Location: [facility] OR [address] [place] [address];  Service: Urology;  Laterality: Left;     Family History       Problem Relation (Age of Onset)    Breast cancer Maternal Cousin (41)    Cancer Maternal Grandfather, Father    Colon cancer Father    Crohn's disease Brother, Daughter    Diabetes Paternal Grandfather, Maternal Grandfather    Endometrial cancer Maternal Aunt    Hearing loss Paternal Grandmother    Heart disease Maternal Grandfather    Hyperlipidemia Father    Hypertension Mother    Liver cancer Father    Skin cancer Maternal Grandfather          Tobacco Use    Smoking status: Never    Smokeless tobacco: Never   Substance and Sexual Activity    Alcohol use: Not Currently     Alcohol/week: 0.0 standard drinks of alcohol    Drug use: No    Sexual activity: Yes     Partners: Male      Birth control/protection: See Surgical Hx     Comment: HYST     Constitutional: No Weight Change, No Fever, No Chills, No Night Sweats, No Fatigue, No Malaise    ENT/Mouth: No Hearing Changes, No Ear Pain, No Nasal Congestion, No Sinus Pain, No Hoarseness, No sore throat, No Rhinorrhea, No Swallowing Difficulty    Eyes: No Eye Pain, No Swelling, No Redness, No Foreign Body, No Discharge, No Vision Changes    Cardiovascular: No Chest Pain, No SOB, No PND, No Dyspnea on Exertion, No Orthopnea, No Claudication, No Edema, No Palpitations    Respiratory: No Cough, No Sputum, No Wheezing, No Smoke Exposure, No Dyspnea    Genitourinary: No Dysmenorrhea, No DUB, No Dyspareunia, No Dysuria, No Urinary Frequency, No Hematuria, No Urinary Incontinence, No Urgency, No Flank Pain, No Urinary Flow Changes, No Hesitancy    Musculoskeletal: No Arthralgias, No Myalgias, No Joint Swelling, No Joint Stiffness, No Back Pain, No Neck Pain, No Injury History    Skin: No Skin Lesions, No Pruritis, No Hair Changes, No Breast/Skin Changes, No Nipple Discharge    Neuro: No Weakness, No Numbness, No Paresthesias, No Loss of Consciousness, No Syncope, No Dizziness, No Headache, No Coordination Changes, No Recent Falls    Psych: No Anxiety/Panic, No Depression, No Insomnia, No Personality Changes, No Delusions, No Rumination, No SI/HI/AH/VH, No Social Issues, No Memory Changes, No Violence/Abuse Hx.,   Heme/Lymph: No Bruising, No Bleeding, No Transfusions History, No Lymphadenopathy    Endocrine: No Polyuria, No Polydipsia, No Temperature Intolerance     Objective:     Vital Signs (Most Recent):  Temp: 98.3 °F (36.8 °C) (10/18/24 2226)  Pulse: 70 (10/18/24 2227)  Resp: 19 (10/18/24 2323)  BP: 113/78 (10/18/24 2227)  SpO2: 100 % (10/18/24 2227) Vital Signs (24h Range):  Temp:  [98.2 °F (36.8 °C)-98.3 °F (36.8 °C)] 98.3 °F (36.8 °C)  Pulse:  [70-87] 70  Resp:  [18-20] 19  SpO2:  [98 %-100 %] 100 %  BP: (113-149)/(78-86) 113/78     Weight: 49.9 kg  "(110 lb)  Body mass index is 20.78 kg/m².    Gen: WD/WN in NAD  HEENT: MMM, Sclera anicteric   Chest: Normocardic, normotensive, bilateral chest rise  Abd: Soft, moderately distended, mild diffuse tenderness, no rebound or guarding, stoma present in RLQ with minimal gas in appliance  Ext: No pitting edema noted       Significant Labs:  BMP (Last 3 Results):   Recent Labs   Lab 10/18/24  2130         K 3.5      CO2 25   BUN 9   CREATININE 0.8   CALCIUM 9.5     CBC (Last 3 Results):   Recent Labs   Lab 10/18/24  2130   WBC 11.58   RBC 4.56   HGB 13.0   HCT 39.5      MCV 87   MCH 28.5   MCHC 32.9     CRP (Last 3 Results): No results for input(s): "CRP" in the last 168 hours.    Significant Diagnostics:  Results for orders placed or performed during the hospital encounter of 10/18/24 (from the past 2160 hours)   CT Abdomen Pelvis With IV Contrast Routine Oral Contrast    Narrative    EXAMINATION:  CT ABDOMEN PELVIS WITH IV CONTRAST    CLINICAL HISTORY:  Abdominal pain, acute, nonlocalized;    TECHNIQUE:  Axial images of the abdomen and pelvis were acquired  after the use of 75 mL Omnipaque 350 IV contrast. No oral contrast was administered.  Coronal and sagittal reconstructions were also obtained.    COMPARISON:  CT abdomen pelvis 10/09/2024.  MRI enterography 05/17/2024.    FINDINGS:  LUNG BASES: Partially imaged heart and pericardium are within normal limits.  Lung bases are clear.    HEPATOBILIARY: Liver is normal in size.  No focal liver lesions.  Normal gallbladder.  No bile duct dilatation.    SPLEEN: No splenomegaly.    PANCREAS: No focal masses or ductal dilatation.    ADRENALS: No adrenal nodules.    KIDNEYS/URETERS: Kidneys enhance symmetrically.  Few small bilateral simple renal cysts.  No hydronephrosis or renal stones.  No solid mass lesions.    BLADDER/PELVIC ORGANS: No bladder wall thickening.  Uterus is surgically absent.  No adnexal lesions.    PERITONEUM / RETROPERITONEUM: No " free intraperitoneal air or discrete rim enhancing intraperitoneal abscess.    Trace interloop fluid adjacent to some of the dilated small bowel.    LYMPH NODES: No lymphadenopathy.    VESSELS: Unremarkable.    GI TRACT: Postsurgical change of colectomy with end ileostomy.  There are multiple fluid-air filled loops of mildly dilated small bowel in the right hemiabdomen and pelvis (measuring up to 3.6 cm in diameter).  No definite transition point identified.    There is suspected narrowing at an intestinal surgical anastomosis (coronal series 601, image 78) with relatively decompressed downstream small bowel.  No associated bowel wall thickening or pneumatosis in this region.    There is an approximately 4 cm long segment of small bowel luminal narrowing, mucosal/mural hyperenhancement, and mucosal/mural thickening) in the right lower quadrant (series 601, image 66; series 3, images 337-386).  There is minimal stranding in the subtending mesentery.  This could represent active enteritis, and/or a small bowel stricture.    SOFT TISSUES: Bilateral breast augmentation.  Focal area of soft tissue attenuation with partial calcification in the right gluteal soft tissues, possibly iatrogenic or sequela of remote trauma.    BONES: No fractures or focal osseous lesions.      Impression    Abdomen CT and Pelvis CT:    History of Crohn's disease status post colectomy with end ileostomy.    Multiple fluid-air filled loops of dilated small bowel in the right hemiabdomen and pelvis with suspected narrowing at a surgical anastomosis.  No definite transition point elsewhere.  Findings may represent small bowel obstruction.  Correlate clinically.    A 4 cm segment of the right lower quadrant small bowel demonstrates findings suspicious for active segmental enteritis and/or small bowel stricture.  Other underlying pathology not fully excluded.  Correlate clinically, and with follow-up imaging.    No free intraperitoneal air.    No  intraperitoneal abscess.    Other findings as detailed above.    This report was flagged in Epic as abnormal.    Damián Cloud MD, first observed the critical findings on 10/18/2024 at 23:20, and sent the results to Lobo Hicks MD, and Shelby Quinonez MD, via Epic Secure Chat message, on 10/18/2024 at 23:27.  Patient name and medical record number were specified/linked in the message.Each recipient immediately responded, acknowledging receipt of the information.    Electronically signed by resident: Dajuan Landry  Date:    10/18/2024  Time:    22:17    Electronically signed by: Damián Cloud  Date:    10/18/2024  Time:    23:28     *Note: Due to a large number of results and/or encounters for the requested time period, some results have not been displayed. A complete set of results can be found in Results Review.         Assessment/Plan:     Active Diagnoses:    Diagnosis Date Noted POA    PRINCIPAL PROBLEM:  Small bowel obstruction [K56.609] 01/02/2016 Yes      Problems Resolved During this Admission:       44 F with Crohn's and a SBO    -Admit to CRS  -NPO/IVF  -NGT declined at this time per patient  -Ativan for antiemesis, has hx prolonged QT  -serial abdominal exams  -await return of bowel function  -IBD consult in am   -Discussed with attending, Dr. Campbell          Thank you for your consult. I will follow-up with patient. Please contact us if you have any additional questions.    Sajan Kirby MD  Colorectal Surgery  [facility] - [facility] Dept

## 2024-10-19 NOTE — PROVIDER PROGRESS NOTES - EMERGENCY DEPT.
Signout Note    I received signout from the previous provider.     Chief complaint:  Flank Pain (Pt c/o right flank pain, N/V, decreased appetite and decreased ostomy output x3 days. Hx of crohn disease.)      Pertinent history &exam:  Ivy Salgado is a 44 y.o. female with pertinent PMH of Crohn's disease, ileostomy, prior bowel obstruction who presented to emergency department with complaint of severe abdominal pain and vomiting.  Received Dilaudid for pain.  Labs reassuring.  Signed out pending CT abdomen pelvis for final disposition.    Vitals:    10/19/24 0112   BP: 118/73   Pulse: 70   Resp: 16   Temp: 98.2 °F (36.8 °C)       Imaging Studies:    X-Ray Abdomen AP 1 View   Final Result      Continued prominent loops of small bowel in the right hemiabdomen, similar to prior, bowel obstruction not excluded.         Electronically signed by: Frank Linares   Date:    10/18/2024   Time:    23:47      CT Abdomen Pelvis With IV Contrast Routine Oral Contrast   Final Result   Abnormal      Abdomen CT and Pelvis CT:      History of Crohn's disease status post colectomy with end ileostomy.      Multiple fluid-air filled loops of dilated small bowel in the right hemiabdomen and pelvis with suspected narrowing at a surgical anastomosis.  No definite transition point elsewhere.  Findings may represent small bowel obstruction.  Correlate clinically.      A 4 cm segment of the right lower quadrant small bowel demonstrates findings suspicious for active segmental enteritis and/or small bowel stricture.  Other underlying pathology not fully excluded.  Correlate clinically, and with follow-up imaging.      No free intraperitoneal air.      No intraperitoneal abscess.      Other findings as detailed above.      This report was flagged in Epic as abnormal.      Damián Cloud MD, first observed the critical findings on 10/18/2024 at 23:20, and sent the results to Lobo Hicks MD, and Shelby Quinonez MD, via [employer] Secure  Chat message, on 10/18/2024 at 23:27.  Patient name and medical record number were specified/linked in the message.Each recipient immediately responded, acknowledging receipt of the information.      Electronically signed by resident: Dajuan Landry   Date:    10/18/2024   Time:    22:17      Electronically signed by: Damián Cloud   Date:    10/18/2024   Time:    23:28          Medications Given:  Medications   LORazepam tablet 0.5 mg (0.5 mg Oral Given 10/19/24 0102)   cyclobenzaprine tablet 10 mg (10 mg Oral Given 10/19/24 0102)   gabapentin capsule 300 mg (has no administration in time range)   propranoloL tablet 20 mg (has no administration in time range)   sucralfate 100 mg/mL suspension 1 g (has no administration in time range)   aluminum-magnesium hydroxide-simethicone 200-200-20 mg/5 mL suspension 30 mL (has no administration in time range)   tamsulosin 24 hr capsule 0.4 mg (has no administration in time range)   valACYclovir tablet 500 mg (has no administration in time range)   sodium chloride 0.9% flush 10 mL (has no administration in time range)   naloxone 0.4 mg/mL injection 0.02 mg (has no administration in time range)   dextrose 5 % and 0.9 % NaCl infusion ( Intravenous New Bag 10/19/24 0047)   enoxaparin injection 40 mg (has no administration in time range)   HYDROmorphone injection 0.5 mg (0.5 mg Intravenous Given 10/19/24 0101)   HYDROmorphone injection 1 mg (has no administration in time range)   HYDROmorphone injection 1 mg (1 mg Intravenous Given 10/18/24 2123)   HYDROmorphone injection 1 mg (1 mg Intravenous Given 10/18/24 2130)   iohexoL (OMNIPAQUE 350) injection 75 mL (75 mLs Intravenous Given 10/18/24 2136)   HYDROmorphone injection 1 mg (1 mg Intravenous Given 10/18/24 2323)       Pending Items/ MDM:  44 y.o. female with above complaint.  CT concerning for small bowel obstruction.  Colorectal surgery consulted, patient admitted to their service in stable condition.    Diagnostic Impression:     1. SBO (small bowel obstruction)    2. Abdominal pain, unspecified abdominal location         ED Disposition Condition    Admit                Shelby Quinonez MD  Emergency Medicine

## 2024-10-19 NOTE — ED TRIAGE NOTES
Ivy Salgado, a 44 y.o. female presents to the ED w/ complaint of flank pain. Patient endorses 10/10 flank, abdominal and chest pain. Patient also endorses N/V but no diarrhea and SOB.    Triage note:  Chief Complaint   Patient presents with    Flank Pain     Pt c/o right flank pain, N/V, decreased appetite and decreased ostomy output x3 days. Hx of crohn disease.     Review of patient's allergies indicates:   Allergen Reactions    Azathioprine sodium Other (See Comments)     Other reaction(s): pancreatitis  Other reaction(s): pancreatitis    Methotrexate analogues Other (See Comments)     leukopenia    Stelara [ustekinumab] Other (See Comments)     Multiple infections    Zofran [ondansetron hcl (pf)] Other (See Comments)     Per patient causes prolong QT    Vancomycin analogues Other (See Comments)     Made her red    Azathioprine      Other reaction(s): Unknown    Methotrexate      Other reaction(s): infection-    Morphine Itching and Other (See Comments)     Other reaction(s): Itching    Zofran [ondansetron hcl]      Other reaction(s): Hives    Bactrim [sulfamethoxazole-trimethoprim] Palpitations    Ciprofloxacin Palpitations     Past Medical History:   Diagnosis Date    Abnormal Pap smear 2007    Alkaline phosphatase elevation- based on lab from 4/9/2019 05/28/2019    Arthritis     Avascular necrosis of bone of hip, left     Bacterial vaginosis     Crohn disease     Depression 08/05/2017    Drug-induced pancreatitis (imuran)     Encounter for blood transfusion     Generalized anxiety disorder     Genital HSV     GERD (gastroesophageal reflux disease)     Hypertension     Ileostomy in place 07/09/2012    Kidney stone     Long QT syndrome     Melanoma in situ (excised-buttocks 2018, para-stomal site 2019)     Moderate episode of recurrent major depressive disorder 02/02/2024    Multiple thyroid nodules 11/27/2018    Osteopenia of multiple sites 01/07/2019    Pancreas cyst (tail, 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)     Recurrent  Clostridioides difficile diarrhea     Recurrent UTI 04/03/2013

## 2024-10-19 NOTE — CONSULTS
[facility]  Gastroenterology Inflammatory Bowel Disease Inpatient Service   Consult Note    Patient Name: Ivy Salgado  MRN: [id number]  Admission Date: 10/18/2024  Hospital Length of Stay: 0 days  Code Status: Full Code   Attending Provider: DUNCAN Campbell MD   Consulting Provider: Vitaliy Benson MD  Primary Care Physician: Luis Madden DO  Principal Problem:Small bowel obstruction  Inpatient consult to Gastroenterology  Consult performed by: Vitaliy Benson MD  Consult ordered by: Sajan Kirby MD        Subjective:     HPI: Ivy Salgado is a 44 y.o. female with Crohn's disease (diagnosed 1990) with end ileostomy and recurrent ileitis, recurrent C diff (2014 x 2), imuran induced pancreatitis, pancreatic tail cyst, recurrent UTI (h/o ESBL 2018) who presents with RLQ abdominal pain that worsened over the past 3 days with nausea, vomiting, and decreased stool output in the ostomy. Pt reports not having any stool output yesterday which prompted her to come to the ER. Continues to report chills but not febrile at home. Decreased PO intake during this time also due to abdominal pain, N/V.     In the ER, patient's labs showed CRP 2.8, WBC 8.20 and CTAP with IV contrast showing 4cm segment of RLQ of small bowel c/f active segmental enteritis and/or small bowel stricture with multiple fluid filled loops of dilated small bowel with suspected narrowing at surgical anastomosis. Today, pt with some stool output seen in ostomy but is much less than she normally sees. Still reporting significant N/V and abdominal discomfort.    Pt recently presented to the ER on 10/9 with similar symptoms of N/V and decreased bowel movement frequency for a few days prior to presenting to the ER but reports having sick contacts at that time with people having similar symptoms at home. CTAP with IV contrast on 10/9 showed no findings of SBO, wall thickening, c/f abscess, pneumatosis or free  air. Since being discharged home, pt reports worsening symptoms as mentioned above.      IBD History:   Ivy Salgado is a 44 y.o. female with Crohn's disease (diagnosed ) with end ileostomy and recurrent ileitis, recurrent C diff (2014 x 2), imuran induced pancreatitis, pancreatic tail cyst, recurrent UTI (h/o ESBL 2018) who has tried many medications for her Crohn's as mentioned below and is currently on Entyvio q8w (next dose due 10/24). She was started on Entyvio on 18 but due to recurrent UTIs and palpitations, discontinued on 10/27/20. She was restarted on Entyvio with induction doses on 2024 and has been getting it every 8 weeks. Most recent ileoscopy in 2024 showed endoscopic remission on Entyvio but unclear if the mild active inflammation seen on MRE in 24 was visualized and sampled on ileoscopy done in 24.    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       Last pertinent Endoscopy/Imagin23 CT A/P slow flow through few mid to distal SB loops noting venous vascular engorgement about these loops and wall hyperemia  1/3/24 EGD: normal except for gastric erythema, bx c/w mild HP neg gastritis   1/3/24 ileoscopy through stoma:  6 apthous ulcers in the distal 20 cm of the ileum and biopsies c/w active inflammation  1/3/24 abd US:  mildly dilated bile ducts in the central right hepatic lobe   24 MRCP c/w no evidence of biliary obstruction, punctate cystic focus in the pancreas tail likely side IPMN  24 MRE: short-segmented stricture with signs of mild active inflammation adjacent to the RLQ  surgical anastomosis with tethering and distortion makes an underlying fistula difficult to exclude.   6/4/24 Ileoscopy: Patent end ileostomy with healthy appearing mucosa in the terminal ileum. The examined portion of the ileum was normal. Biopsies normal  10/9/24 CTAP with IV contrast: surgical changes of colectomy with end ileostomy. No findings of SBO, hyperemia, or wall thickening, hepatomegaly     Therapeutic Drug Monitoring Labs:  None     Prior IBD Therapies:  Proctofoam - partially effective  imuran (pancreatitis)  MTX (leukopenia)  Remicade- secondary nonresponder  Humira- discontinued due to drug induced lupus due to joint pains  Entocort- fall 2018, broke open capsule when taking- not sure if effective   Cimzia [id number]- secondary nonresponder  Stelara (7/11/17-1/2018)- discontinued due to rash  Canasa ineffective   Prednisone- effective- last took 1-2/2024    Past Medical History:   Diagnosis Date    Abnormal Pap smear 2007    Alkaline phosphatase elevation- based on lab from 4/9/2019 05/28/2019    Arthritis     Avascular necrosis of bone of hip, left     Bacterial vaginosis     Crohn disease     Depression 08/05/2017    Drug-induced pancreatitis (imuran)     Encounter for blood transfusion     Generalized anxiety disorder     Genital HSV     GERD (gastroesophageal reflux disease)     Hypertension     Ileostomy in place 07/09/2012    Kidney stone     Long QT syndrome     Melanoma in situ (excised-buttocks 2018, para-stomal site 2019)     Moderate episode of recurrent major depressive disorder 02/02/2024    Multiple thyroid nodules 11/27/2018    Osteopenia of multiple sites 01/07/2019    Pancreas cyst (tail, 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)     Recurrent Clostridioides difficile diarrhea     Recurrent UTI 04/03/2013     Past Surgical History:   Procedure Laterality Date    ABDOMINAL SURGERY      APPENDECTOMY      ARTHROPLASTY OF SHOULDER Left 7/18/2023    Procedure: ARTHROPLASTY, SHOULDER;  Surgeon: Sharon Babb MD;   Location: [facility] OR;  Service: Orthopedics;  Laterality: Left;    AUGMENTATION OF BREAST Bilateral 06/2022    gel implants    BILATERAL SALPINGO-OOPHORECTOMY (BSO) Bilateral 05/30/2019    Procedure: SALPINGO-OOPHORECTOMY, BILATERAL;  Surgeon: Rupa German MD;  Location: [facility] OR 2ND FLR;  Service: OB/GYN;  Laterality: Bilateral;    BLADDER SURGERY      partial cystectomy due to fistula    breast lift      BREAST SURGERY      [facility]      COLON SURGERY      COLONOSCOPY      CYSTOSCOPY  09/23/2020    Procedure: CYSTOSCOPY;  Surgeon: Sascha Florentino MD;  Location: [facility] OR 1ST FLR;  Service: Urology;;    CYSTOSCOPY W/ URETERAL STENT PLACEMENT Left 09/15/2020    Procedure: CYSTOSCOPY, WITH URETERAL STENT INSERTION;  Surgeon: Sascha Florentino MD;  Location: [facility] OR 1ST FLR;  Service: Urology;  Laterality: Left;    DIAGNOSTIC LAPAROSCOPY N/A 07/09/2020    Procedure: LAPAROSCOPY, DIAGNOSTIC;  Surgeon: Gilson El MD;  Location: [facility] OR;  Service: OB/GYN;  Laterality: N/A;    ESOPHAGOGASTRODUODENOSCOPY N/A 1/3/2024    Procedure: EGD (ESOPHAGOGASTRODUODENOSCOPY);  Surgeon: Lily Lind MD;  Location: [facility] (2ND FLR);  Service: Endoscopy;  Laterality: N/A;    EXCISION OF MELANOMA  07/17/2019    ILEOSCOPY N/A 1/3/2024    Procedure: ILEOSCOPY;  Surgeon: Lily Lind MD;  Location: [facility] (2ND FLR);  Service: Endoscopy;  Laterality: N/A;    ILEOSCOPY N/A 1/24/2024    Procedure: ILEOSCOPY;  Surgeon: Lilian Navarro MD;  Location: [facility] ENDO (2ND FLR);  Service: Endoscopy;  Laterality: N/A;  through stoma    ILEOSCOPY N/A 6/4/2024    Procedure: ILEOSCOPY;  Surgeon: Peace Garcia MD;  Location: [facility] ENDO (4TH FLR);  Service: Endoscopy;  Laterality: N/A;  Ref By:,kaleb sent via portal,AC  received urgent message to reschedule pt from 7/15  to may or june-updated prep instructions sent- RIKKI  5/29/24- precall complete - ERW    ILEOSTOMY      INSERTION OF TUNNELED CENTRAL VENOUS CATHETER (CVC)  WITH SUBCUTANEOUS PORT Right 9/10/2024    Procedure: INSERTION, SINGLE LUMEN CATHETER WITH PORT, WITH FLUOROSCOPIC GUIDANCE, Rt neck or chest, prefers chest;  Surgeon: Vinh Lloyd MD;  Location: [facility] OR 2ND FLR;  Service: General;  Laterality: Right;    LAPAROSCOPIC LYSIS OF ADHESIONS N/A 07/09/2020    Procedure: LYSIS, ADHESIONS, LAPAROSCOPIC;  Surgeon: Gilson El MD;  Location: [facility] OR;  Service: OB/GYN;  Laterality: N/A;    LASER LITHOTRIPSY  09/23/2020    Procedure: LITHOTRIPSY, USING LASER;  Surgeon: Sascha Florentino MD;  Location: [facility] OR 1ST FLR;  Service: Urology;;    LYSIS OF ADHESIONS N/A 05/30/2019    Procedure: LYSIS, ADHESIONS;  Surgeon: Rupa German MD;  Location: [facility] OR 2ND FLR;  Service: OB/GYN;  Laterality: N/A;    MEDIPORT REMOVAL Left 9/10/2024    Procedure: REMOVAL, CATHETER, CENTRAL VENOUS, TUNNELED, WITH PORT, Left side;  Surgeon: Vinh Lloyd MD;  Location: [facility] OR 2ND FLR;  Service: General;  Laterality: Left;    OOPHORECTOMY Right 04/16/2015    PORTACATH PLACEMENT  02/21/2017    SKIN BIOPSY      SMALL INTESTINE SURGERY      age 18 Y    TOTAL ABDOMINAL HYSTERECTOMY  04/16/2015    TOTAL COLECTOMY      TUBAL LIGATION  06/06/2012    UPPER GASTROINTESTINAL ENDOSCOPY      URETEROSCOPIC REMOVAL OF URETERIC CALCULUS  09/23/2020    Procedure: REMOVAL, CALCULUS, URETER, URETEROSCOPIC;  Surgeon: Sascha Florentino MD;  [facility];  Service: Urology;;    URETEROSCOPY Left 09/23/2020    Procedure: URETEROSCOPY;  Surgeon: Sascha Florentino MD;  Location: [facility] OR 1ST FLR;  Service: Urology;  Laterality: Left;     Family History   Problem Relation Name Age of Onset    Diabetes Paternal Grandfather Stephen     Hearing loss Paternal Grandmother Viviane     Cancer Maternal Grandfather Philippe         Skin    Skin cancer Maternal Grandfather Philippe     Diabetes Maternal Grandfather Philippe     Heart disease Maternal Grandfather Philippe     Colon cancer Father Milan      Cancer Father Milan         Colon    Liver cancer Father Milan     Hyperlipidemia Father Milan     Hypertension Mother Shaila     Crohn's disease Brother      Endometrial cancer Maternal Aunt      Breast cancer Maternal Cousin  41    Crohn's disease Daughter      Ovarian cancer Neg Hx      Melanoma Neg Hx       Social History     Socioeconomic History    Marital status: Single   Occupational History     Employer: [employer]   Tobacco Use    Smoking status: Never    Smokeless tobacco: Never   Substance and Sexual Activity    Alcohol use: Not Currently     Alcohol/week: 0.0 standard drinks of alcohol    Drug use: No    Sexual activity: Yes     Partners: Male     Birth control/protection: See Surgical Hx     Comment: HYST   Other Topics Concern    Are you pregnant or think you may be? No    Breast-feeding No     Social Drivers of Health     Financial Resource Strain: Low Risk  (10/19/2024)    Overall Financial Resource Strain (CARDIA)     Difficulty of Paying Living Expenses: Not hard at all   Food Insecurity: No Food Insecurity (10/19/2024)    Hunger Vital Sign     Worried About Running Out of Food in the Last Year: Never true     Ran Out of Food in the Last Year: Never true   Transportation Needs: No Transportation Needs (10/19/2024)    TRANSPORTATION NEEDS     Transportation : No   Physical Activity: Insufficiently Active (1/17/2024)    Exercise Vital Sign     Days of Exercise per Week: 3 days     Minutes of Exercise per Session: 30 min   Stress: No Stress Concern Present (10/19/2024)    [employer] [place] [employer] - Occupational Stress Questionnaire     Feeling of Stress : Not at all   Housing Stability: Low Risk  (10/19/2024)    Housing Stability Vital Sign     Unable to Pay for Housing in the Last Year: No     Homeless in the Last Year: No     Scheduled Meds:   aluminum-magnesium hydroxide-simethicone  30 mL Oral QID (AC & HS)    cyclobenzaprine  10 mg Oral QHS    enoxparin  40 mg  "Subcutaneous Daily    gabapentin  300 mg Oral BID    methylPREDNISolone injection (PEDS and ADULTS)  20 mg Intravenous TID    methylPREDNISolone injection (PEDS and ADULTS)  20 mg Intravenous Once    propranoloL  20 mg Oral BID    sucralfate  1 g Oral Q6H    tamsulosin  0.4 mg Oral Daily    valACYclovir  500 mg Oral Daily     Continuous Infusions:   D5 and 0.9% NaCl   Intravenous Continuous 125 mL/hr at 10/19/24 0544 Rate Verify at 10/19/24 0544     PRN Meds:.  Current Facility-Administered Medications:     HYDROmorphone, 0.5 mg, Intravenous, Q4H PRN    HYDROmorphone, 1 mg, Intravenous, Q4H PRN    LORazepam, 0.5 mg, Oral, Q6H PRN    naloxone, 0.02 mg, Intravenous, PRN    sodium chloride 0.9%, 10 mL, Intravenous, PRN  Review of patient's allergies indicates:   Allergen Reactions    Azathioprine sodium Other (See Comments)     Other reaction(s): pancreatitis  Other reaction(s): pancreatitis    Methotrexate analogues Other (See Comments)     leukopenia    Stelara [ustekinumab] Other (See Comments)     Multiple infections    Zofran [ondansetron hcl (pf)] Other (See Comments)     Per patient causes prolong QT    Vancomycin analogues Other (See Comments)     Made her red    Azathioprine      Other reaction(s): Unknown    Methotrexate      Other reaction(s): infection-    Morphine Itching and Other (See Comments)     Other reaction(s): Itching    Zofran [ondansetron hcl]      Other reaction(s): Hives    Bactrim [sulfamethoxazole-trimethoprim] Palpitations    Ciprofloxacin Palpitations         Objective:   /72 (BP Location: Left arm, Patient Position: Sitting)   Pulse 67   Temp 97.6 °F (36.4 °C) (Oral)   Resp 17   Ht 5' 1" (1.549 m)   Wt 50 kg (110 lb 3.7 oz)   LMP 03/30/2015   SpO2 99%   Breastfeeding No   BMI 20.83 kg/m²   Constitutional: ill appearing, but non-toxic and well developed  HENT: Head: Normal, normocephalic, atraumatic.  Eyes: conjunctiva clear and sclera nonicteric  Respiratory: normal chest " expansion & respiratory effort   and no accessory muscle use  GI: soft, tenderness mild generalized tenderness, without guarding, and without rebound  Skin: normal color  Psychiatric: pt is a good historian; no memory problems were noted    Labs:  CRP 1.6 (4/16/24) -> 3.2 (10/18/24) -> 2.8 (10/19/24)  WBC 8.20    Current Imaging:  CTAP with IV contrast 10/18/24:  Multiple fluid-air filled loops of dilated small bowel in the right hemiabdomen and pelvis with suspected narrowing at a surgical anastomosis.  No definite transition point elsewhere.  Findings may represent small bowel obstruction.  Correlate clinically.  A 4 cm segment of the right lower quadrant small bowel demonstrates findings suspicious for active segmental enteritis and/or small bowel stricture.  Other underlying pathology not fully excluded.  Correlate clinically, and with follow-up imaging.  No free intraperitoneal air.  No intraperitoneal abscess.    Assessment/Plan:   Ivy Salgado is a 44 y.o. female with Crohn's disease (diagnosed 1990) with end ileostomy and recurrent ileitis, recurrent C diff (2014 x 2), imuran induced pancreatitis, pancreatic tail cyst, recurrent UTI (h/o ESBL 2018) who presents with RLQ abdominal pain that worsened over the past 3 days with nausea, vomiting, and decreased stool output in the ostomy. Workup concerning for small bowel obstruction and imaging c/f active segmental enteritis despite normal CRP. IBD treatment she's currently on includes Entyvio q8w infusions since 1/30/24 (next dose due 10/24).    Impression:  Lack of significant improvement with conservative management with signs of SBO. Imaging findings concerning for possible active Crohn's in the small bowel that may be contributing to this SBO presentation. Given these, will plan to start steroids and reassess if patient's symptoms improve. Most recent ileoscopy in 6/2024 showed endoscopic remission on Entyvio but unclear if the mild active inflammation  seen on MRE in 5/17/24 was visualized and sampled on ileoscopy done in 6/4/24.    Problem List:  Crohn's disease with end ileostomy  Small bowel obstruction  Possible active inflammation seen on CT imaging  Generalized abdominal pain, nausea, vomiting    Recommendations:  - Plan to start IV methylprednisolone 20mg TID today  - Avoid NSAIDs given risk of IBD exacerbation  - DVT prophylaxis- IBD pts at 3-4 fold higher likelihood of DVT, DVT prophylaxis- lovenox to be given tonight  - Narcotics increase risk of infection along with morbidity/mortality in IBD patients, tylenol preferred and if pain not controlled with tylenol then short-acting morphine preferred    Thank you for involving us in the care of Ivy Salgado. Please call with any additional questions, concerns or changes in the patient's clinical status.     Discussed and seen with Dr. Paez.    Vitaliy Benson  Gastroenterology Fellow PGY-IV  [facility]

## 2024-10-20 LAB
ANION GAP SERPL CALC-SCNC: 6 MMOL/L (ref 8–16)
BASOPHILS # BLD AUTO: 0.02 K/UL (ref 0–0.2)
BASOPHILS NFR BLD: 0.4 % (ref 0–1.9)
BUN SERPL-MCNC: 3 MG/DL (ref 6–20)
CALCIUM SERPL-MCNC: 8.3 MG/DL (ref 8.7–10.5)
CHLORIDE SERPL-SCNC: 111 MMOL/L (ref 95–110)
CO2 SERPL-SCNC: 25 MMOL/L (ref 23–29)
CREAT SERPL-MCNC: 0.7 MG/DL (ref 0.5–1.4)
CRP SERPL-MCNC: 2.4 MG/L (ref 0–8.2)
DIFFERENTIAL METHOD BLD: ABNORMAL
EOSINOPHIL # BLD AUTO: 0.1 K/UL (ref 0–0.5)
EOSINOPHIL NFR BLD: 2.5 % (ref 0–8)
ERYTHROCYTE [DISTWIDTH] IN BLOOD BY AUTOMATED COUNT: 13.9 % (ref 11.5–14.5)
EST. GFR  (NO RACE VARIABLE): >60 ML/MIN/1.73 M^2
GLUCOSE SERPL-MCNC: 131 MG/DL (ref 70–110)
HCT VFR BLD AUTO: 33.5 % (ref 37–48.5)
HGB BLD-MCNC: 10.8 G/DL (ref 12–16)
IMM GRANULOCYTES # BLD AUTO: 0.01 K/UL (ref 0–0.04)
IMM GRANULOCYTES NFR BLD AUTO: 0.2 % (ref 0–0.5)
LYMPHOCYTES # BLD AUTO: 2.4 K/UL (ref 1–4.8)
LYMPHOCYTES NFR BLD: 45.9 % (ref 18–48)
MAGNESIUM SERPL-MCNC: 1.9 MG/DL (ref 1.6–2.6)
MCH RBC QN AUTO: 28.1 PG (ref 27–31)
MCHC RBC AUTO-ENTMCNC: 32.2 G/DL (ref 32–36)
MCV RBC AUTO: 87 FL (ref 82–98)
MONOCYTES # BLD AUTO: 0.5 K/UL (ref 0.3–1)
MONOCYTES NFR BLD: 9.6 % (ref 4–15)
NEUTROPHILS # BLD AUTO: 2.1 K/UL (ref 1.8–7.7)
NEUTROPHILS NFR BLD: 41.4 % (ref 38–73)
NRBC BLD-RTO: 0 /100 WBC
PHOSPHATE SERPL-MCNC: 3.6 MG/DL (ref 2.7–4.5)
PLATELET # BLD AUTO: 237 K/UL (ref 150–450)
PMV BLD AUTO: 9.6 FL (ref 9.2–12.9)
POTASSIUM SERPL-SCNC: 3.2 MMOL/L (ref 3.5–5.1)
RBC # BLD AUTO: 3.84 M/UL (ref 4–5.4)
SODIUM SERPL-SCNC: 142 MMOL/L (ref 136–145)
WBC # BLD AUTO: 5.12 K/UL (ref 3.9–12.7)

## 2024-10-20 PROCEDURE — 80048 BASIC METABOLIC PNL TOTAL CA: CPT | Performed by: STUDENT IN AN ORGANIZED HEALTH CARE EDUCATION/TRAINING PROGRAM

## 2024-10-20 PROCEDURE — 99232 SBSQ HOSP IP/OBS MODERATE 35: CPT | Mod: ,,, | Performed by: COLON & RECTAL SURGERY

## 2024-10-20 PROCEDURE — 86140 C-REACTIVE PROTEIN: CPT | Performed by: STUDENT IN AN ORGANIZED HEALTH CARE EDUCATION/TRAINING PROGRAM

## 2024-10-20 PROCEDURE — 25000003 PHARM REV CODE 250

## 2024-10-20 PROCEDURE — 63600175 PHARM REV CODE 636 W HCPCS: Performed by: STUDENT IN AN ORGANIZED HEALTH CARE EDUCATION/TRAINING PROGRAM

## 2024-10-20 PROCEDURE — 84100 ASSAY OF PHOSPHORUS: CPT | Performed by: STUDENT IN AN ORGANIZED HEALTH CARE EDUCATION/TRAINING PROGRAM

## 2024-10-20 PROCEDURE — 85025 COMPLETE CBC W/AUTO DIFF WBC: CPT | Performed by: STUDENT IN AN ORGANIZED HEALTH CARE EDUCATION/TRAINING PROGRAM

## 2024-10-20 PROCEDURE — 63600175 PHARM REV CODE 636 W HCPCS

## 2024-10-20 PROCEDURE — 83735 ASSAY OF MAGNESIUM: CPT | Performed by: STUDENT IN AN ORGANIZED HEALTH CARE EDUCATION/TRAINING PROGRAM

## 2024-10-20 PROCEDURE — 25000003 PHARM REV CODE 250: Performed by: STUDENT IN AN ORGANIZED HEALTH CARE EDUCATION/TRAINING PROGRAM

## 2024-10-20 PROCEDURE — 20600001 HC STEP DOWN PRIVATE ROOM

## 2024-10-20 RX ORDER — POTASSIUM CHLORIDE 7.45 MG/ML
10 INJECTION INTRAVENOUS
Status: COMPLETED | OUTPATIENT
Start: 2024-10-20 | End: 2024-10-20

## 2024-10-20 RX ADMIN — HYDROMORPHONE HYDROCHLORIDE 1 MG: 1 INJECTION, SOLUTION INTRAMUSCULAR; INTRAVENOUS; SUBCUTANEOUS at 05:10

## 2024-10-20 RX ADMIN — ENOXAPARIN SODIUM 40 MG: 40 INJECTION SUBCUTANEOUS at 05:10

## 2024-10-20 RX ADMIN — POTASSIUM CHLORIDE 10 MEQ: 7.46 INJECTION, SOLUTION INTRAVENOUS at 08:10

## 2024-10-20 RX ADMIN — POTASSIUM CHLORIDE 10 MEQ: 7.46 INJECTION, SOLUTION INTRAVENOUS at 11:10

## 2024-10-20 RX ADMIN — DEXTROSE MONOHYDRATE AND SODIUM CHLORIDE: 5; .9 INJECTION, SOLUTION INTRAVENOUS at 06:10

## 2024-10-20 RX ADMIN — HYDROMORPHONE HYDROCHLORIDE 1 MG: 1 INJECTION, SOLUTION INTRAMUSCULAR; INTRAVENOUS; SUBCUTANEOUS at 11:10

## 2024-10-20 RX ADMIN — HYDROMORPHONE HYDROCHLORIDE 1 MG: 1 INJECTION, SOLUTION INTRAMUSCULAR; INTRAVENOUS; SUBCUTANEOUS at 09:10

## 2024-10-20 RX ADMIN — CYCLOBENZAPRINE HYDROCHLORIDE 10 MG: 5 TABLET, FILM COATED ORAL at 09:10

## 2024-10-20 RX ADMIN — HYDROMORPHONE HYDROCHLORIDE 1 MG: 1 INJECTION, SOLUTION INTRAMUSCULAR; INTRAVENOUS; SUBCUTANEOUS at 03:10

## 2024-10-20 RX ADMIN — POTASSIUM CHLORIDE 10 MEQ: 7.46 INJECTION, SOLUTION INTRAVENOUS at 01:10

## 2024-10-20 RX ADMIN — GABAPENTIN 300 MG: 300 CAPSULE ORAL at 08:10

## 2024-10-20 RX ADMIN — ALUMINUM HYDROXIDE, MAGNESIUM HYDROXIDE, AND SIMETHICONE 30 ML: 200; 200; 20 SUSPENSION ORAL at 09:10

## 2024-10-20 RX ADMIN — LORAZEPAM 0.5 MG: 0.5 TABLET ORAL at 06:10

## 2024-10-20 RX ADMIN — PROMETHAZINE HYDROCHLORIDE 12.5 MG: 25 INJECTION INTRAMUSCULAR; INTRAVENOUS at 11:10

## 2024-10-20 RX ADMIN — GABAPENTIN 300 MG: 300 CAPSULE ORAL at 09:10

## 2024-10-20 RX ADMIN — SUCRALFATE 1 G: 1 SUSPENSION ORAL at 12:10

## 2024-10-20 RX ADMIN — LORAZEPAM 0.5 MG: 0.5 TABLET ORAL at 03:10

## 2024-10-20 RX ADMIN — HYDROMORPHONE HYDROCHLORIDE 1 MG: 1 INJECTION, SOLUTION INTRAMUSCULAR; INTRAVENOUS; SUBCUTANEOUS at 01:10

## 2024-10-20 RX ADMIN — LORAZEPAM 0.5 MG: 0.5 TABLET ORAL at 09:10

## 2024-10-20 RX ADMIN — VALACYCLOVIR HYDROCHLORIDE 500 MG: 500 TABLET, FILM COATED ORAL at 08:10

## 2024-10-20 RX ADMIN — DEXTROSE MONOHYDRATE AND SODIUM CHLORIDE: 5; .9 INJECTION, SOLUTION INTRAVENOUS at 05:10

## 2024-10-20 NOTE — TREATMENT PLAN
GI Treatment Plan    Ivy Salgado is a 44 y.o. female admitted to hospital 10/18/2024 (Hospital Day: 3) due to Small bowel obstruction.     Interval History  Pt still without improvement. Feeling nauseous and vomiting. About 100cc output from ostomy in last 24 hours which is a lot less than she usually has. Refused steroids yesterday and hesitant to take them today.    Objective  Temp:  [97.4 °F (36.3 °C)-98.1 °F (36.7 °C)] 98.1 °F (36.7 °C) (10/20 1220)  Pulse:  [67-80] 67 (10/20 1220)  BP: (107-139)/(72-82) 107/73 (10/20 1220)  Resp:  [16-20] 18 (10/20 1313)  SpO2:  [96 %-99 %] 98 % (10/20 1220)    General: Alert, Oriented x3, no distress  Abdomen: Non-distended. Normal tympany. Soft. Non-tender. No peritoneal signs.    Laboratory    Recent Labs   Lab 10/18/24  2130 10/19/24  0340 10/20/24  0303   HGB 13.0 12.0 10.8*         Assessment and Plan  Ivy Salgado is a 44 y.o. female with Crohn's disease (diagnosed 1990) with end ileostomy and recurrent ileitis, recurrent C diff (2014 x 2), imuran induced pancreatitis, pancreatic tail cyst, recurrent UTI (h/o ESBL 2018) who presents with RLQ abdominal pain that worsened over the past 3 days with nausea, vomiting, and decreased stool output in the ostomy. Workup concerning for small bowel obstruction and imaging c/f active segmental enteritis despite normal CRP. IBD treatment she's currently on includes Entyvio q8w infusions since 1/30/24 (next dose due 10/24).     Impression:  Lack of significant improvement with conservative management with signs of SBO. Imaging findings concerning for possible active Crohn's in the small bowel that may be contributing to this SBO presentation. Given these, we were plan to start steroids and reassess if patient's symptoms improve on 10/19 but patient refused to take them. Most recent ileoscopy in 6/2024 showed endoscopic remission on Entyvio but unclear if the mild active inflammation seen on MRE in 5/17/24 was visualized and  sampled on ileoscopy done in 6/4/24.     Problem List:  Crohn's disease with end ileostomy  Small bowel obstruction  Possible active inflammation seen on CT imaging  Generalized abdominal pain, nausea, vomiting     Recommendations:  - Plan to start IV methylprednisolone 20mg TID on 10/19 but pt refused to take them. Still no significant improvement in N/V and stool output  - Avoid NSAIDs given risk of IBD exacerbation  - DVT prophylaxis- IBD pts at 3-4 fold higher likelihood of DVT, DVT prophylaxis- lovenox to be given tonight  - Narcotics increase risk of infection along with morbidity/mortality in IBD patients, tylenol preferred and if pain not controlled with tylenol then short-acting morphine preferred  - We will continue to follow.    Thank you for involving us in the care of Ivy Salgado. Please call with any additional questions, concerns or changes in the patient's clinical status.    Discussed with Dr. Paez.    Vitaliy Benson MD  Gastroenterology Fellow, PGY IV

## 2024-10-20 NOTE — PROGRESS NOTES
[facility]  Colorectal Surgery  Progress Note    Patient Name: Ivy Salgado  MRN: [id number]  Admission Date: 10/18/2024  Hospital Length of Stay: 0 days  Attending Physician: DUNCAN Campbell MD    Subjective:     Interval History: AF/HDS, abdominal pain somewhat improved, still vomiting significantly. Ostomy with 100-200cc of output last 24 hrs per patient.     Post-Op Info:  * No surgery found *          Medications:  Continuous Infusions:   D5 and 0.9% NaCl   Intravenous Continuous 125 mL/hr at 10/19/24 0544 Rate Verify at 10/19/24 0544     Scheduled Meds:   aluminum-magnesium hydroxide-simethicone  30 mL Oral QID (AC & HS)    cyclobenzaprine  10 mg Oral QHS    enoxparin  40 mg Subcutaneous Daily    gabapentin  300 mg Oral BID    propranoloL  20 mg Oral BID    sucralfate  1 g Oral Q6H    tamsulosin  0.4 mg Oral Daily    valACYclovir  500 mg Oral Daily     PRN Meds:   aluminum-magnesium hydroxide-simethicone 200-200-20 mg/5 mL suspension 30 mL    cyclobenzaprine tablet 10 mg    enoxaparin injection 40 mg    gabapentin capsule 300 mg    propranoloL tablet 20 mg    sucralfate 100 mg/mL suspension 1 g    tamsulosin 24 hr capsule 0.4 mg    valACYclovir tablet 500 mg        Objective:     Wt Readings from Last 3 Encounters:   10/19/24 50 kg (110 lb 3.7 oz)   10/09/24 49.9 kg (110 lb)   09/13/24 49.9 kg (110 lb 0.2 oz)     Temp Readings from Last 3 Encounters:   10/20/24 98.1 °F (36.7 °C) (Oral)   10/09/24 97.9 °F (36.6 °C) (Oral)   09/10/24 97.9 °F (36.6 °C) (Temporal)     BP Readings from Last 3 Encounters:   10/20/24 125/79   10/09/24 112/73   09/13/24 100/70     Pulse Readings from Last 3 Encounters:   10/20/24 80   10/09/24 81   09/13/24 75     Intake/Output - Last 3 Shifts         10/18 0700  10/19 0659 10/19 0700  10/20 0659 10/20 0700  10/21 0659    I.V. (mL/kg) 591.9 (11.8) 2728.8 (54.6)     IV Piggyback  49.1     Total Intake(mL/kg) 591.9 (11.8) 2777.9 (55.6)     Net +591.9 +2777.9             Urine Occurrence  3 x     Stool Occurrence  0 x             Physical Exam  Constitutional:       Appearance: Normal appearance.   HENT:      Head: Normocephalic and atraumatic.   Eyes:      Extraocular Movements: Extraocular movements intact.   Cardiovascular:      Rate and Rhythm: Normal rate.   Pulmonary:      Effort: Pulmonary effort is normal. No respiratory distress.   Abdominal:      Soft, min D, nontender, ostomy with scant output   Skin:     General: Skin is warm and dry.   Neurological:      General: No focal deficit present.      Mental Status: She is alert and oriented to person, place, and time.            Lab Results   Component Value Date    WBC 5.12 10/20/2024    HGB 10.8 (L) 10/20/2024    HCT 33.5 (L) 10/20/2024    MCV 87 10/20/2024     10/20/2024       BMP  Lab Results   Component Value Date     10/20/2024    K 3.2 (L) 10/20/2024     (H) 10/20/2024    CO2 25 10/20/2024    BUN 3 (L) 10/20/2024    CREATININE 0.7 10/20/2024    CALCIUM 8.3 (L) 10/20/2024    ANIONGAP 6 (L) 10/20/2024    EGFRNORACEVR >60.0 10/20/2024           Significant Diagnostics:  I have reviewed all pertinent imaging results/findings within the past 24 hours.  Assessment/Plan:     * Small bowel obstruction  Ms. Salgado is a 44 y.o F who presented with small bowel obstruction and was admitted to the colorectal surgery service due to history of Crohn's.    -NPO, patient declined NGT  -F/U with IBD GI consult, appreciate recs.  -Ativan for antiemesis, has hx prolonged QT  -serial abdominal exams  -await return of bowel function  -dvt ppx  -mIVF  -Discussed with attending, Dr. Shannan Kirby M.D.  Fellow - Colon and Rectal Surgery

## 2024-10-21 LAB
ANION GAP SERPL CALC-SCNC: 4 MMOL/L (ref 8–16)
BASOPHILS # BLD AUTO: 0.02 K/UL (ref 0–0.2)
BASOPHILS NFR BLD: 0.4 % (ref 0–1.9)
BUN SERPL-MCNC: <3 MG/DL (ref 6–20)
CALCIUM SERPL-MCNC: 8.7 MG/DL (ref 8.7–10.5)
CHLORIDE SERPL-SCNC: 109 MMOL/L (ref 95–110)
CO2 SERPL-SCNC: 27 MMOL/L (ref 23–29)
CREAT SERPL-MCNC: 0.7 MG/DL (ref 0.5–1.4)
CRP SERPL-MCNC: 1.2 MG/L (ref 0–8.2)
DIFFERENTIAL METHOD BLD: ABNORMAL
EOSINOPHIL # BLD AUTO: 0.2 K/UL (ref 0–0.5)
EOSINOPHIL NFR BLD: 3.1 % (ref 0–8)
ERYTHROCYTE [DISTWIDTH] IN BLOOD BY AUTOMATED COUNT: 13.4 % (ref 11.5–14.5)
EST. GFR  (NO RACE VARIABLE): >60 ML/MIN/1.73 M^2
GLUCOSE SERPL-MCNC: 114 MG/DL (ref 70–110)
HCT VFR BLD AUTO: 33.1 % (ref 37–48.5)
HGB BLD-MCNC: 10.7 G/DL (ref 12–16)
IMM GRANULOCYTES # BLD AUTO: 0.01 K/UL (ref 0–0.04)
IMM GRANULOCYTES NFR BLD AUTO: 0.2 % (ref 0–0.5)
LYMPHOCYTES # BLD AUTO: 2 K/UL (ref 1–4.8)
LYMPHOCYTES NFR BLD: 41.3 % (ref 18–48)
MAGNESIUM SERPL-MCNC: 1.8 MG/DL (ref 1.6–2.6)
MCH RBC QN AUTO: 28.2 PG (ref 27–31)
MCHC RBC AUTO-ENTMCNC: 32.3 G/DL (ref 32–36)
MCV RBC AUTO: 87 FL (ref 82–98)
MONOCYTES # BLD AUTO: 0.4 K/UL (ref 0.3–1)
MONOCYTES NFR BLD: 8.9 % (ref 4–15)
NEUTROPHILS # BLD AUTO: 2.2 K/UL (ref 1.8–7.7)
NEUTROPHILS NFR BLD: 46.1 % (ref 38–73)
NRBC BLD-RTO: 0 /100 WBC
PHOSPHATE SERPL-MCNC: 3.6 MG/DL (ref 2.7–4.5)
PLATELET # BLD AUTO: 245 K/UL (ref 150–450)
PMV BLD AUTO: 9.4 FL (ref 9.2–12.9)
POTASSIUM SERPL-SCNC: 3.3 MMOL/L (ref 3.5–5.1)
RBC # BLD AUTO: 3.8 M/UL (ref 4–5.4)
SODIUM SERPL-SCNC: 140 MMOL/L (ref 136–145)
WBC # BLD AUTO: 4.84 K/UL (ref 3.9–12.7)

## 2024-10-21 PROCEDURE — 63600175 PHARM REV CODE 636 W HCPCS: Performed by: STUDENT IN AN ORGANIZED HEALTH CARE EDUCATION/TRAINING PROGRAM

## 2024-10-21 PROCEDURE — 20600001 HC STEP DOWN PRIVATE ROOM

## 2024-10-21 PROCEDURE — 25000003 PHARM REV CODE 250: Performed by: STUDENT IN AN ORGANIZED HEALTH CARE EDUCATION/TRAINING PROGRAM

## 2024-10-21 PROCEDURE — 85025 COMPLETE CBC W/AUTO DIFF WBC: CPT | Performed by: STUDENT IN AN ORGANIZED HEALTH CARE EDUCATION/TRAINING PROGRAM

## 2024-10-21 PROCEDURE — 80048 BASIC METABOLIC PNL TOTAL CA: CPT | Performed by: STUDENT IN AN ORGANIZED HEALTH CARE EDUCATION/TRAINING PROGRAM

## 2024-10-21 PROCEDURE — 25500020 PHARM REV CODE 255: Performed by: STUDENT IN AN ORGANIZED HEALTH CARE EDUCATION/TRAINING PROGRAM

## 2024-10-21 PROCEDURE — 63600175 PHARM REV CODE 636 W HCPCS

## 2024-10-21 PROCEDURE — 83735 ASSAY OF MAGNESIUM: CPT | Performed by: STUDENT IN AN ORGANIZED HEALTH CARE EDUCATION/TRAINING PROGRAM

## 2024-10-21 PROCEDURE — 84100 ASSAY OF PHOSPHORUS: CPT | Performed by: STUDENT IN AN ORGANIZED HEALTH CARE EDUCATION/TRAINING PROGRAM

## 2024-10-21 PROCEDURE — 25000003 PHARM REV CODE 250

## 2024-10-21 PROCEDURE — 86140 C-REACTIVE PROTEIN: CPT | Performed by: STUDENT IN AN ORGANIZED HEALTH CARE EDUCATION/TRAINING PROGRAM

## 2024-10-21 RX ORDER — NADOLOL 40 MG/1
40 TABLET ORAL DAILY
Status: DISCONTINUED | OUTPATIENT
Start: 2024-10-21 | End: 2024-10-23 | Stop reason: HOSPADM

## 2024-10-21 RX ORDER — POTASSIUM CHLORIDE 7.45 MG/ML
10 INJECTION INTRAVENOUS
Status: COMPLETED | OUTPATIENT
Start: 2024-10-21 | End: 2024-10-21

## 2024-10-21 RX ADMIN — SUCRALFATE 1 G: 1 SUSPENSION ORAL at 11:10

## 2024-10-21 RX ADMIN — POTASSIUM CHLORIDE 10 MEQ: 7.46 INJECTION, SOLUTION INTRAVENOUS at 11:10

## 2024-10-21 RX ADMIN — LORAZEPAM 0.5 MG: 0.5 TABLET ORAL at 04:10

## 2024-10-21 RX ADMIN — GABAPENTIN 300 MG: 300 CAPSULE ORAL at 08:10

## 2024-10-21 RX ADMIN — HYDROMORPHONE HYDROCHLORIDE 1 MG: 1 INJECTION, SOLUTION INTRAMUSCULAR; INTRAVENOUS; SUBCUTANEOUS at 05:10

## 2024-10-21 RX ADMIN — HYDROMORPHONE HYDROCHLORIDE 1 MG: 1 INJECTION, SOLUTION INTRAMUSCULAR; INTRAVENOUS; SUBCUTANEOUS at 04:10

## 2024-10-21 RX ADMIN — HYDROMORPHONE HYDROCHLORIDE 1 MG: 1 INJECTION, SOLUTION INTRAMUSCULAR; INTRAVENOUS; SUBCUTANEOUS at 08:10

## 2024-10-21 RX ADMIN — DEXTROSE MONOHYDRATE AND SODIUM CHLORIDE: 5; .9 INJECTION, SOLUTION INTRAVENOUS at 03:10

## 2024-10-21 RX ADMIN — ALUMINUM HYDROXIDE, MAGNESIUM HYDROXIDE, AND SIMETHICONE 30 ML: 200; 200; 20 SUSPENSION ORAL at 05:10

## 2024-10-21 RX ADMIN — HYDROMORPHONE HYDROCHLORIDE 1 MG: 1 INJECTION, SOLUTION INTRAMUSCULAR; INTRAVENOUS; SUBCUTANEOUS at 09:10

## 2024-10-21 RX ADMIN — DEXTROSE MONOHYDRATE AND SODIUM CHLORIDE: 5; .9 INJECTION, SOLUTION INTRAVENOUS at 09:10

## 2024-10-21 RX ADMIN — PROMETHAZINE HYDROCHLORIDE 12.5 MG: 25 INJECTION INTRAMUSCULAR; INTRAVENOUS at 01:10

## 2024-10-21 RX ADMIN — DIATRIZOATE MEGLUMINE AND DIATRIZOATE SODIUM 100 ML: 660; 100 LIQUID ORAL; RECTAL at 08:10

## 2024-10-21 RX ADMIN — NADOLOL 40 MG: 40 TABLET ORAL at 01:10

## 2024-10-21 RX ADMIN — DEXTROSE MONOHYDRATE AND SODIUM CHLORIDE: 5; .9 INJECTION, SOLUTION INTRAVENOUS at 12:10

## 2024-10-21 RX ADMIN — ALUMINUM HYDROXIDE, MAGNESIUM HYDROXIDE, AND SIMETHICONE 30 ML: 200; 200; 20 SUSPENSION ORAL at 08:10

## 2024-10-21 RX ADMIN — HYDROMORPHONE HYDROCHLORIDE 1 MG: 1 INJECTION, SOLUTION INTRAMUSCULAR; INTRAVENOUS; SUBCUTANEOUS at 01:10

## 2024-10-21 RX ADMIN — SUCRALFATE 1 G: 1 SUSPENSION ORAL at 05:10

## 2024-10-21 RX ADMIN — ALUMINUM HYDROXIDE, MAGNESIUM HYDROXIDE, AND SIMETHICONE 30 ML: 200; 200; 20 SUSPENSION ORAL at 11:10

## 2024-10-21 RX ADMIN — VALACYCLOVIR HYDROCHLORIDE 500 MG: 500 TABLET, FILM COATED ORAL at 08:10

## 2024-10-21 RX ADMIN — POTASSIUM CHLORIDE 10 MEQ: 7.46 INJECTION, SOLUTION INTRAVENOUS at 08:10

## 2024-10-21 RX ADMIN — POTASSIUM CHLORIDE 10 MEQ: 7.46 INJECTION, SOLUTION INTRAVENOUS at 10:10

## 2024-10-21 RX ADMIN — SUCRALFATE 1 G: 1 SUSPENSION ORAL at 12:10

## 2024-10-21 RX ADMIN — PROMETHAZINE HYDROCHLORIDE 12.5 MG: 25 INJECTION INTRAMUSCULAR; INTRAVENOUS at 11:10

## 2024-10-21 RX ADMIN — ENOXAPARIN SODIUM 40 MG: 40 INJECTION SUBCUTANEOUS at 05:10

## 2024-10-21 RX ADMIN — POTASSIUM CHLORIDE 10 MEQ: 7.46 INJECTION, SOLUTION INTRAVENOUS at 09:10

## 2024-10-21 NOTE — PROGRESS NOTES
[facility]  Gastroenterology Inflammatory Bowel Disease Inpatient Service   Progress Note    Patient Name: Ivy Salgado  MRN: [id number]  Admission Date: 10/18/2024  Hospital Length of Stay: 2 days  Code Status: Full Code   Attending Provider: Dr. Peace Garcia  Consulting Provider: Jana Dunne MD  Primary Care Physician: Luis Madden DO  Principal Problem:Small bowel obstruction  Consults  Subjective:   Interval History:   Liquid stool in ostomy bag, less than normal amount per patient  Blood: none  Urgency: n/a  Nocturnal bowel movements: n/a    Current IBD meds:  Entyvio [id number], last dose     ER/Hospital Course:  10/18 admission, patient's labs showed CRP 2.8, WBC 8.20 and CTAP with IV contrast showing 4cm segment of RLQ of small bowel c/f active segmental enteritis and/or small bowel stricture with multiple fluid filled loops of dilated small bowel with suspected narrowing at surgical anastomosis. GI consulted over the weekend, recommended starting IV methylprednisolone but patient is refusing. Started having stool output in ostomy on 10/20, today just liquid not really stool. Pain improved to 7/10 from 10/10 yesterday. No further vomiting since 10/20. Starting PO clears today.     Pt recently presented to the ER on 10/9 with similar symptoms of N/V and decreased bowel movement frequency for a few days prior to presenting to the ER but reports having sick contacts at that time with people having similar symptoms at home. CTAP with IV contrast on 10/9 showed no findings of SBO, wall thickening, c/f abscess, pneumatosis or free air. Since being discharged home, pt reports worsening symptoms as mentioned above.    IBD Previous History:   Ivy Salgado is a 44 y.o. female with Crohn's disease (diagnosed 1990) with end ileostomy and recurrent ileitis, recurrent C diff (2014 x 2), imuran induced pancreatitis, pancreatic tail cyst, recurrent UTI (h/o ESBL 2018) who has tried many  "medications for her Crohn's as mentioned below and is currently on Entyvio q8w (next dose due 10/24). She was started on Entyvio on 11/20/18 but due to recurrent UTIs and palpitations, discontinued on 10/27/20. She was restarted on Entyvio with induction doses on 1/30/2024 and has been getting it every 8 weeks. Most recent ileoscopy in 6/2024 showed endoscopic remission on Entyvio but unclear if the mild active inflammation seen on MRE in 5/17/24 was visualized and sampled on ileoscopy done in 6/4/24.     Scheduled Meds:   aluminum-magnesium hydroxide-simethicone  30 mL Oral QID (AC & HS)    cyclobenzaprine  10 mg Oral QHS    enoxparin  40 mg Subcutaneous Daily    gabapentin  300 mg Oral BID    methylPREDNISolone injection (PEDS and ADULTS)  20 mg Intravenous TID    methylPREDNISolone injection (PEDS and ADULTS)  20 mg Intravenous Once    nadoloL  40 mg Oral Daily    sucralfate  1 g Oral Q6H    tamsulosin  0.4 mg Oral Daily    valACYclovir  500 mg Oral Daily     Continuous Infusions:   D5 and 0.9% NaCl   Intravenous Continuous 125 mL/hr at 10/21/24 1234 New Bag at 10/21/24 1234     PRN Meds:.  Current Facility-Administered Medications:     HYDROmorphone, 0.5 mg, Intravenous, Q4H PRN    HYDROmorphone, 1 mg, Intravenous, Q4H PRN    LORazepam, 0.5 mg, Oral, Q6H PRN    naloxone, 0.02 mg, Intravenous, PRN    promethazine (PHENERGAN) 12.5 mg in 0.9% NaCl 50 mL IVPB, 12.5 mg, Intravenous, Q6H PRN    sodium chloride 0.9%, 10 mL, Intravenous, PRN    ROS     Objective:   /77 (BP Location: Left arm, Patient Position: Lying)   Pulse (!) 58   Temp 98.1 °F (36.7 °C) (Oral)   Resp 16   Ht 5' 1" (1.549 m)   Wt 50 kg (110 lb 3.7 oz)   LMP 03/30/2015   SpO2 97%   Breastfeeding No   BMI 20.83 kg/m²   Constitutional:  not in acute distress and well developed  Eyes: conjunctiva clear and sclera nonicteric  Respiratory: normal chest expansion & respiratory effort   and no accessory muscle use  GI: soft, nondistended, and " tenderness moderate in the entire abdomen  Skin: normal color      Labs:  CRP 3.2--->2.4-->1.2    Current Imaging:  CT A/P 10/18  Impression:     Abdomen CT and Pelvis CT:  History of Crohn's disease status post colectomy with end ileostomy.  Multiple fluid-air filled loops of dilated small bowel in the right hemiabdomen and pelvis with suspected narrowing at a surgical anastomosis.  No definite transition point elsewhere.  Findings may represent small bowel obstruction.  Correlate clinically.  A 4 cm segment of the right lower quadrant small bowel demonstrates findings suspicious for active segmental enteritis and/or small bowel stricture.  Other underlying pathology not fully excluded.  Correlate clinically, and with follow-up imaging.  No free intraperitoneal air.  No intraperitoneal abscess.    Current Endoscopy:  N/a    Assessment/Plan:   Ivy Salgado is a 44 y.o. female with hx Crohns disease s/p total proctocolectomy with end ileostomy, multiple prior SBOs admitted to colorectal surgery service for nausea, vomiting, and abdominal pain.     Impression:  After discussion with Dr. Garcia, the site of obstruction is thought to be at the prior anastomosis from fistula repair surgery in the 1990s. Cannot entirely rule out inflammation but with normal CRP it is more likely to be that her obstruction is from scar tissue or adhesions from her multiple prior surgeries.     Problem List:  Crohn's disease s/p total proctocolectomy with end ileostomy  Small bowel obstruction  Possible active inflammation seen on CT imaging  Generalized abdominal pain, nausea, vomiting    Recommendations:  - after further discussion with Dr. Garcia, no indication for steroids at this time   - next dose Entyvio 10/24  - small bowel follow through showed gastrograffin passing, SBO resolving  - primary team advancing diet   - Avoid NSAIDs given risk of IBD exacerbation  - DVT prophylaxis- IBD pts at 3-4 fold higher likelihood of DVT, DVT  prophylaxis- lovenox  - Narcotics increase risk of infection along with morbidity/mortality in IBD patients, tylenol preferred and if pain not controlled with tylenol then short-acting morphine preferred    Thank you for involving us in the care of Ivy Delacruz Salgado. Please call with any additional questions, concerns or changes in the patient's clinical status.     Jana Dunne  Gastroenterology Fellow PGY   [facility]

## 2024-10-21 NOTE — PLAN OF CARE
Problem: Adult Inpatient Plan of Care  Goal: Plan of Care Review  Outcome: Progressing     Problem: Pain Acute  Goal: Optimal Pain Control and Function  Outcome: Progressing     Problem: Fall Injury Risk  Goal: Absence of Fall and Fall-Related Injury  Outcome: Progressing

## 2024-10-21 NOTE — PROGRESS NOTES
[facility]  Colorectal Surgery  Progress Note    Patient Name: Ivy Salgado  MRN: [id number]  Admission Date: 10/18/2024  Hospital Length of Stay: 0 days  Attending Physician: DUNCAN Campbell MD    Subjective:     Interval History: bilious emesis yesterday, still refusing N, some liquid output in ostomy bag    Post-Op Info:  * No surgery found *          Medications:  Continuous Infusions:   D5 and 0.9% NaCl   Intravenous Continuous 125 mL/hr at 10/19/24 0544 Rate Verify at 10/19/24 0544     Scheduled Meds:   aluminum-magnesium hydroxide-simethicone  30 mL Oral QID (AC & HS)    cyclobenzaprine  10 mg Oral QHS    enoxparin  40 mg Subcutaneous Daily    gabapentin  300 mg Oral BID    propranoloL  20 mg Oral BID    sucralfate  1 g Oral Q6H    tamsulosin  0.4 mg Oral Daily    valACYclovir  500 mg Oral Daily     PRN Meds:   aluminum-magnesium hydroxide-simethicone 200-200-20 mg/5 mL suspension 30 mL    cyclobenzaprine tablet 10 mg    enoxaparin injection 40 mg    gabapentin capsule 300 mg    propranoloL tablet 20 mg    sucralfate 100 mg/mL suspension 1 g    tamsulosin 24 hr capsule 0.4 mg    valACYclovir tablet 500 mg        Objective:     Wt Readings from Last 3 Encounters:   10/19/24 50 kg (110 lb 3.7 oz)   10/09/24 49.9 kg (110 lb)   09/13/24 49.9 kg (110 lb 0.2 oz)     Temp Readings from Last 3 Encounters:   10/21/24 98 °F (36.7 °C) (Oral)   10/09/24 97.9 °F (36.6 °C) (Oral)   09/10/24 97.9 °F (36.6 °C) (Temporal)     BP Readings from Last 3 Encounters:   10/21/24 120/80   10/09/24 112/73   09/13/24 100/70     Pulse Readings from Last 3 Encounters:   10/21/24 71   10/09/24 81   09/13/24 75     Intake/Output - Last 3 Shifts         10/19 0700  10/20 0659 10/20 0700  10/21 0659 10/21 0700  10/22 0659    I.V. (mL/kg) 2728.8 (54.6)      IV Piggyback 49.1      Total Intake(mL/kg) 2777.9 (55.6)      Net +2777.9             Urine Occurrence 3 x      Stool Occurrence 0 x 0 x             Physical  Exam  Constitutional:       Appearance: Normal appearance.   HENT:      Head: Normocephalic and atraumatic.   Eyes:      Extraocular Movements: Extraocular movements intact.   Cardiovascular:      Rate and Rhythm: Normal rate.   Pulmonary:      Effort: Pulmonary effort is normal. No respiratory distress.   Abdominal:      Soft, min D, nontender, ostomy with some liquid output  Skin:     General: Skin is warm and dry.   Neurological:      General: No focal deficit present.      Mental Status: She is alert and oriented to person, place, and time.       Lab Results   Component Value Date    WBC 4.84 10/21/2024    HGB 10.7 (L) 10/21/2024    HCT 33.1 (L) 10/21/2024    MCV 87 10/21/2024     10/21/2024       BMP  Lab Results   Component Value Date     10/21/2024    K 3.3 (L) 10/21/2024     10/21/2024    CO2 27 10/21/2024    BUN <3 (L) 10/21/2024    CREATININE 0.7 10/21/2024    CALCIUM 8.7 10/21/2024    ANIONGAP 4 (L) 10/21/2024    EGFRNORACEVR >60.0 10/21/2024       Significant Diagnostics:  I have reviewed all pertinent imaging results/findings within the past 24 hours.  Assessment/Plan:     * Small bowel obstruction  Ms. Salgado is a 44 y.o F who presented with small bowel obstruction and was admitted to the colorectal surgery service due to history of Crohn's.    -NPO, patient declined NGT  -appreciate GI recs --> ptn is refusing steroids from their recommendations  -serial abdominal exams  -mIVF  -gastrograffin challenge  -Discussed with attending, Dr. Shannan Cooper MD  Colon & Rectal Surgery Fellow

## 2024-10-21 NOTE — NURSING
"[facility] Plan of Care Note    Dx:   Abdominal pain, unspecified abdominal location [R10.9]    Shift Events: No acute overnight events    Goals of Care: monitor labs, pain and nausea     Neuro: A&Ox4    Vital Signs: /86 (BP Location: Left arm, Patient Position: Lying)   Pulse 69   Temp 98.4 °F (36.9 °C) (Oral)   Resp 19   Ht 5' 1" (1.549 m)   Wt 50 kg (110 lb 3.7 oz)   LMP 03/30/2015   SpO2 98%   Breastfeeding No   BMI 20.83 kg/m²     Respiratory: RA    Diet: Diet NPO Except for: Sips with Medication (crackers ok), Ice Chips, Medication, Other (Comment)    Is patient tolerating current diet? yes    GTTS: D5 NS @ 125    Urine Output/Bowel Movement:   No intake/output data recorded.  Last Bowel Movement: 10/17/24    Drains/Tubes/Tube Feeds (include total output/shift):   No intake/output data recorded.    Lines: R CW mediport    Accuchecks:none    Skin: ostomy     Fall Risk Score: 6    Activity level? independent    Any scheduled procedures? none    Any safety concerns? Pain, K+     Other:     "

## 2024-10-22 LAB
ANION GAP SERPL CALC-SCNC: 7 MMOL/L (ref 8–16)
ANION GAP SERPL CALC-SCNC: 7 MMOL/L (ref 8–16)
BASOPHILS # BLD AUTO: 0.03 K/UL (ref 0–0.2)
BASOPHILS NFR BLD: 0.5 % (ref 0–1.9)
BUN SERPL-MCNC: <3 MG/DL (ref 6–20)
BUN SERPL-MCNC: <3 MG/DL (ref 6–20)
CALCIUM SERPL-MCNC: 9.2 MG/DL (ref 8.7–10.5)
CALCIUM SERPL-MCNC: 9.2 MG/DL (ref 8.7–10.5)
CHLORIDE SERPL-SCNC: 109 MMOL/L (ref 95–110)
CHLORIDE SERPL-SCNC: 109 MMOL/L (ref 95–110)
CO2 SERPL-SCNC: 25 MMOL/L (ref 23–29)
CO2 SERPL-SCNC: 25 MMOL/L (ref 23–29)
CREAT SERPL-MCNC: 0.7 MG/DL (ref 0.5–1.4)
CREAT SERPL-MCNC: 0.7 MG/DL (ref 0.5–1.4)
CRP SERPL-MCNC: 0.8 MG/L (ref 0–8.2)
DIFFERENTIAL METHOD BLD: ABNORMAL
EOSINOPHIL # BLD AUTO: 0.3 K/UL (ref 0–0.5)
EOSINOPHIL NFR BLD: 4.9 % (ref 0–8)
ERYTHROCYTE [DISTWIDTH] IN BLOOD BY AUTOMATED COUNT: 13.7 % (ref 11.5–14.5)
EST. GFR  (NO RACE VARIABLE): >60 ML/MIN/1.73 M^2
EST. GFR  (NO RACE VARIABLE): >60 ML/MIN/1.73 M^2
GLUCOSE SERPL-MCNC: 112 MG/DL (ref 70–110)
GLUCOSE SERPL-MCNC: 112 MG/DL (ref 70–110)
HCT VFR BLD AUTO: 38.4 % (ref 37–48.5)
HGB BLD-MCNC: 12.3 G/DL (ref 12–16)
IMM GRANULOCYTES # BLD AUTO: 0.01 K/UL (ref 0–0.04)
IMM GRANULOCYTES NFR BLD AUTO: 0.2 % (ref 0–0.5)
LYMPHOCYTES # BLD AUTO: 2.2 K/UL (ref 1–4.8)
LYMPHOCYTES NFR BLD: 40.1 % (ref 18–48)
MAGNESIUM SERPL-MCNC: 1.9 MG/DL (ref 1.6–2.6)
MAGNESIUM SERPL-MCNC: 1.9 MG/DL (ref 1.6–2.6)
MCH RBC QN AUTO: 27.6 PG (ref 27–31)
MCHC RBC AUTO-ENTMCNC: 32 G/DL (ref 32–36)
MCV RBC AUTO: 86 FL (ref 82–98)
MONOCYTES # BLD AUTO: 0.6 K/UL (ref 0.3–1)
MONOCYTES NFR BLD: 11.5 % (ref 4–15)
NEUTROPHILS # BLD AUTO: 2.4 K/UL (ref 1.8–7.7)
NEUTROPHILS NFR BLD: 42.8 % (ref 38–73)
NRBC BLD-RTO: 0 /100 WBC
PHOSPHATE SERPL-MCNC: 4.4 MG/DL (ref 2.7–4.5)
PHOSPHATE SERPL-MCNC: 4.4 MG/DL (ref 2.7–4.5)
PLATELET # BLD AUTO: 298 K/UL (ref 150–450)
PMV BLD AUTO: 9 FL (ref 9.2–12.9)
POTASSIUM SERPL-SCNC: 3.6 MMOL/L (ref 3.5–5.1)
POTASSIUM SERPL-SCNC: 3.6 MMOL/L (ref 3.5–5.1)
RBC # BLD AUTO: 4.45 M/UL (ref 4–5.4)
SODIUM SERPL-SCNC: 141 MMOL/L (ref 136–145)
SODIUM SERPL-SCNC: 141 MMOL/L (ref 136–145)
WBC # BLD AUTO: 5.56 K/UL (ref 3.9–12.7)

## 2024-10-22 PROCEDURE — 20600001 HC STEP DOWN PRIVATE ROOM

## 2024-10-22 PROCEDURE — 83735 ASSAY OF MAGNESIUM: CPT | Performed by: STUDENT IN AN ORGANIZED HEALTH CARE EDUCATION/TRAINING PROGRAM

## 2024-10-22 PROCEDURE — 63600175 PHARM REV CODE 636 W HCPCS: Performed by: STUDENT IN AN ORGANIZED HEALTH CARE EDUCATION/TRAINING PROGRAM

## 2024-10-22 PROCEDURE — 25000003 PHARM REV CODE 250: Performed by: STUDENT IN AN ORGANIZED HEALTH CARE EDUCATION/TRAINING PROGRAM

## 2024-10-22 PROCEDURE — 99232 SBSQ HOSP IP/OBS MODERATE 35: CPT | Mod: ,,, | Performed by: COLON & RECTAL SURGERY

## 2024-10-22 PROCEDURE — 99233 SBSQ HOSP IP/OBS HIGH 50: CPT | Mod: ,,, | Performed by: INTERNAL MEDICINE

## 2024-10-22 PROCEDURE — 25000003 PHARM REV CODE 250

## 2024-10-22 PROCEDURE — 63600175 PHARM REV CODE 636 W HCPCS

## 2024-10-22 PROCEDURE — 85025 COMPLETE CBC W/AUTO DIFF WBC: CPT | Performed by: STUDENT IN AN ORGANIZED HEALTH CARE EDUCATION/TRAINING PROGRAM

## 2024-10-22 PROCEDURE — 86140 C-REACTIVE PROTEIN: CPT | Performed by: STUDENT IN AN ORGANIZED HEALTH CARE EDUCATION/TRAINING PROGRAM

## 2024-10-22 PROCEDURE — 80048 BASIC METABOLIC PNL TOTAL CA: CPT | Performed by: STUDENT IN AN ORGANIZED HEALTH CARE EDUCATION/TRAINING PROGRAM

## 2024-10-22 PROCEDURE — 84100 ASSAY OF PHOSPHORUS: CPT | Performed by: STUDENT IN AN ORGANIZED HEALTH CARE EDUCATION/TRAINING PROGRAM

## 2024-10-22 RX ORDER — PROCHLORPERAZINE EDISYLATE 5 MG/ML
5 INJECTION INTRAMUSCULAR; INTRAVENOUS EVERY 6 HOURS PRN
Status: DISCONTINUED | OUTPATIENT
Start: 2024-10-22 | End: 2024-10-23 | Stop reason: HOSPADM

## 2024-10-22 RX ORDER — SCOLOPAMINE TRANSDERMAL SYSTEM 1 MG/1
1 PATCH, EXTENDED RELEASE TRANSDERMAL
Status: DISCONTINUED | OUTPATIENT
Start: 2024-10-22 | End: 2024-10-23 | Stop reason: HOSPADM

## 2024-10-22 RX ADMIN — VALACYCLOVIR HYDROCHLORIDE 500 MG: 500 TABLET, FILM COATED ORAL at 08:10

## 2024-10-22 RX ADMIN — SUCRALFATE 1 G: 1 SUSPENSION ORAL at 05:10

## 2024-10-22 RX ADMIN — HYDROMORPHONE HYDROCHLORIDE 1 MG: 1 INJECTION, SOLUTION INTRAMUSCULAR; INTRAVENOUS; SUBCUTANEOUS at 02:10

## 2024-10-22 RX ADMIN — HYDROMORPHONE HYDROCHLORIDE 1 MG: 1 INJECTION, SOLUTION INTRAMUSCULAR; INTRAVENOUS; SUBCUTANEOUS at 10:10

## 2024-10-22 RX ADMIN — ENOXAPARIN SODIUM 40 MG: 40 INJECTION SUBCUTANEOUS at 04:10

## 2024-10-22 RX ADMIN — HYDROMORPHONE HYDROCHLORIDE 1 MG: 1 INJECTION, SOLUTION INTRAMUSCULAR; INTRAVENOUS; SUBCUTANEOUS at 05:10

## 2024-10-22 RX ADMIN — GABAPENTIN 300 MG: 300 CAPSULE ORAL at 08:10

## 2024-10-22 RX ADMIN — SUCRALFATE 1 G: 1 SUSPENSION ORAL at 11:10

## 2024-10-22 RX ADMIN — ALUMINUM HYDROXIDE, MAGNESIUM HYDROXIDE, AND SIMETHICONE 30 ML: 200; 200; 20 SUSPENSION ORAL at 08:10

## 2024-10-22 RX ADMIN — DEXTROSE MONOHYDRATE AND SODIUM CHLORIDE: 5; .9 INJECTION, SOLUTION INTRAVENOUS at 05:10

## 2024-10-22 RX ADMIN — ALUMINUM HYDROXIDE, MAGNESIUM HYDROXIDE, AND SIMETHICONE 30 ML: 200; 200; 20 SUSPENSION ORAL at 05:10

## 2024-10-22 RX ADMIN — HYDROMORPHONE HYDROCHLORIDE 1 MG: 1 INJECTION, SOLUTION INTRAMUSCULAR; INTRAVENOUS; SUBCUTANEOUS at 06:10

## 2024-10-22 RX ADMIN — NADOLOL 40 MG: 40 TABLET ORAL at 08:10

## 2024-10-22 RX ADMIN — SCOPALAMINE 1 PATCH: 1 PATCH, EXTENDED RELEASE TRANSDERMAL at 08:10

## 2024-10-22 RX ADMIN — ALUMINUM HYDROXIDE, MAGNESIUM HYDROXIDE, AND SIMETHICONE 30 ML: 200; 200; 20 SUSPENSION ORAL at 04:10

## 2024-10-22 RX ADMIN — ALUMINUM HYDROXIDE, MAGNESIUM HYDROXIDE, AND SIMETHICONE 30 ML: 200; 200; 20 SUSPENSION ORAL at 10:10

## 2024-10-22 RX ADMIN — PROMETHAZINE HYDROCHLORIDE 12.5 MG: 25 INJECTION INTRAMUSCULAR; INTRAVENOUS at 08:10

## 2024-10-22 RX ADMIN — CYCLOBENZAPRINE HYDROCHLORIDE 10 MG: 5 TABLET, FILM COATED ORAL at 08:10

## 2024-10-22 NOTE — PLAN OF CARE
XR Gastrograffin Challenge performed - 4hr image 0215; 8 hour image- 0615. IV Phenergan for nausea-no vomiting this shift.  Pain management priority tonight. Will continue to observe and current POC.     Problem: Adult Inpatient Plan of Care  Goal: Plan of Care Review  Outcome: Progressing  Goal: Patient-Specific Goal (Individualized)  Outcome: Progressing  Goal: Absence of Hospital-Acquired Illness or Injury  Outcome: Progressing  Goal: Optimal Comfort and Wellbeing  Outcome: Progressing  Goal: Readiness for Transition of Care  Outcome: Progressing     Problem: Pain Acute  Goal: Optimal Pain Control and Function  Outcome: Progressing     Problem: Fall Injury Risk  Goal: Absence of Fall and Fall-Related Injury  Outcome: Progressing     Problem: Infection  Goal: Absence of Infection Signs and Symptoms  Outcome: Progressing     Problem: Intestinal Obstruction  Goal: Optimal Bowel Function  Outcome: Progressing  Goal: Fluid and Electrolyte Balance  Outcome: Progressing  Goal: Absence of Infection Signs and Symptoms  Outcome: Progressing  Goal: Optimize Nutrition Status  Outcome: Progressing  Goal: Optimal Pain Control and Function  Outcome: Progressing

## 2024-10-22 NOTE — PROGRESS NOTES
[facility]  Colorectal Surgery  Progress Note    Patient Name: Ivy Salgado  MRN: [id number]  Admission Date: 10/18/2024  Hospital Length of Stay: 0 days  Attending Physician: DUNCAN Campbell MD    Subjective:     Interval History: nausea and dry heaves, says all the oral contrast came out of stoma bag (200cc oral contrast and >700cc recorded output)    Post-Op Info:  * No surgery found *          Medications:  Continuous Infusions:   D5 and 0.9% NaCl   Intravenous Continuous 125 mL/hr at 10/19/24 0544 Rate Verify at 10/19/24 0544     Scheduled Meds:   aluminum-magnesium hydroxide-simethicone  30 mL Oral QID (AC & HS)    cyclobenzaprine  10 mg Oral QHS    enoxparin  40 mg Subcutaneous Daily    gabapentin  300 mg Oral BID    propranoloL  20 mg Oral BID    sucralfate  1 g Oral Q6H    tamsulosin  0.4 mg Oral Daily    valACYclovir  500 mg Oral Daily     PRN Meds:   aluminum-magnesium hydroxide-simethicone 200-200-20 mg/5 mL suspension 30 mL    cyclobenzaprine tablet 10 mg    enoxaparin injection 40 mg    gabapentin capsule 300 mg    propranoloL tablet 20 mg    sucralfate 100 mg/mL suspension 1 g    tamsulosin 24 hr capsule 0.4 mg    valACYclovir tablet 500 mg        Objective:     Wt Readings from Last 3 Encounters:   10/19/24 50 kg (110 lb 3.7 oz)   10/09/24 49.9 kg (110 lb)   09/13/24 49.9 kg (110 lb 0.2 oz)     Temp Readings from Last 3 Encounters:   10/22/24 98.1 °F (36.7 °C)   10/09/24 97.9 °F (36.6 °C) (Oral)   09/10/24 97.9 °F (36.6 °C) (Temporal)     BP Readings from Last 3 Encounters:   10/22/24 136/75   10/09/24 112/73   09/13/24 100/70     Pulse Readings from Last 3 Encounters:   10/22/24 76   10/09/24 81   09/13/24 75     Intake/Output - Last 3 Shifts         10/20 0700  10/21 0659 10/21 0700  10/22 0659 10/22 0700  10/23 0659    I.V. (mL/kg)  3679.3 (73.6)     IV Piggyback  141.6     Total Intake(mL/kg)  3820.9 (76.4)     Urine (mL/kg/hr)  450 (0.4)     Stool  925     Total Output  1375     Net   +2445.9            Stool Occurrence 0 x              Physical Exam  Constitutional:       Appearance: Normal appearance.   HENT:      Head: Normocephalic and atraumatic.   Eyes:      Extraocular Movements: Extraocular movements intact.   Cardiovascular:      Rate and Rhythm: Normal rate.   Pulmonary:      Effort: Pulmonary effort is normal. No respiratory distress.   Abdominal:      Soft, non distended, mildly tender to palpation, ostomy with liquid output  Skin:     General: Skin is warm and dry.   Neurological:      General: No focal deficit present.      Mental Status: She is alert and oriented to person, place, and time.       Lab Results   Component Value Date    WBC 5.56 10/22/2024    HGB 12.3 10/22/2024    HCT 38.4 10/22/2024    MCV 86 10/22/2024     10/22/2024       BMP  Lab Results   Component Value Date     10/22/2024     10/22/2024    K 3.6 10/22/2024    K 3.6 10/22/2024     10/22/2024     10/22/2024    CO2 25 10/22/2024    CO2 25 10/22/2024    BUN <3 (L) 10/22/2024    BUN <3 (L) 10/22/2024    CREATININE 0.7 10/22/2024    CREATININE 0.7 10/22/2024    CALCIUM 9.2 10/22/2024    CALCIUM 9.2 10/22/2024    ANIONGAP 7 (L) 10/22/2024    ANIONGAP 7 (L) 10/22/2024    EGFRNORACEVR >60.0 10/22/2024    EGFRNORACEVR >60.0 10/22/2024       Significant Diagnostics:  I have reviewed all pertinent imaging results/findings within the past 24 hours.  Assessment/Plan:     * Small bowel obstruction  Ms. Salgado is a 44 y.o F who presented with small bowel obstruction and was admitted to the colorectal surgery service due to history of Crohn's.    10/19 admit, having emesis, refusing NGT  10/20 still refusing NGT, seen by GI and refusing steroids  10/21 gastrograffin challenge  10/22 Contrast passed through into ostomy bag, will advance diet.    -okay to adv to clears with boost breeze and stop IVF  -replete lytes  -scopalamine patch and compazine prn added  -appreciate GI recs for crohns  management and inflammation concern on CT    Scarlett Cooper MD  Colon & Rectal Surgery Fellow

## 2024-10-22 NOTE — PLAN OF CARE
Problem: Adult Inpatient Plan of Care  Goal: Plan of Care Review  Outcome: Progressing     Problem: Pain Acute  Goal: Optimal Pain Control and Function  Outcome: Progressing     Problem: Fall Injury Risk  Goal: Absence of Fall and Fall-Related Injury  Outcome: Progressing     Problem: Infection  Goal: Absence of Infection Signs and Symptoms  Outcome: Progressing

## 2024-10-23 VITALS
HEART RATE: 73 BPM | SYSTOLIC BLOOD PRESSURE: 103 MMHG | RESPIRATION RATE: 18 BRPM | DIASTOLIC BLOOD PRESSURE: 59 MMHG | TEMPERATURE: 98 F | BODY MASS INDEX: 20.82 KG/M2 | OXYGEN SATURATION: 95 % | WEIGHT: 110.25 LBS | HEIGHT: 61 IN

## 2024-10-23 LAB
ANION GAP SERPL CALC-SCNC: 11 MMOL/L (ref 8–16)
ANION GAP SERPL CALC-SCNC: 11 MMOL/L (ref 8–16)
BASOPHILS # BLD AUTO: 0.02 K/UL (ref 0–0.2)
BASOPHILS NFR BLD: 0.3 % (ref 0–1.9)
BUN SERPL-MCNC: 6 MG/DL (ref 6–20)
BUN SERPL-MCNC: 6 MG/DL (ref 6–20)
CALCIUM SERPL-MCNC: 9.5 MG/DL (ref 8.7–10.5)
CALCIUM SERPL-MCNC: 9.5 MG/DL (ref 8.7–10.5)
CHLORIDE SERPL-SCNC: 104 MMOL/L (ref 95–110)
CHLORIDE SERPL-SCNC: 104 MMOL/L (ref 95–110)
CO2 SERPL-SCNC: 25 MMOL/L (ref 23–29)
CO2 SERPL-SCNC: 25 MMOL/L (ref 23–29)
CREAT SERPL-MCNC: 0.7 MG/DL (ref 0.5–1.4)
CREAT SERPL-MCNC: 0.7 MG/DL (ref 0.5–1.4)
CRP SERPL-MCNC: 0.9 MG/L (ref 0–8.2)
DIFFERENTIAL METHOD BLD: NORMAL
EOSINOPHIL # BLD AUTO: 0.2 K/UL (ref 0–0.5)
EOSINOPHIL NFR BLD: 3.5 % (ref 0–8)
ERYTHROCYTE [DISTWIDTH] IN BLOOD BY AUTOMATED COUNT: 13.6 % (ref 11.5–14.5)
EST. GFR  (NO RACE VARIABLE): >60 ML/MIN/1.73 M^2
EST. GFR  (NO RACE VARIABLE): >60 ML/MIN/1.73 M^2
GLUCOSE SERPL-MCNC: 82 MG/DL (ref 70–110)
GLUCOSE SERPL-MCNC: 82 MG/DL (ref 70–110)
HCT VFR BLD AUTO: 38 % (ref 37–48.5)
HGB BLD-MCNC: 12.3 G/DL (ref 12–16)
IMM GRANULOCYTES # BLD AUTO: 0.01 K/UL (ref 0–0.04)
IMM GRANULOCYTES NFR BLD AUTO: 0.2 % (ref 0–0.5)
LYMPHOCYTES # BLD AUTO: 2.5 K/UL (ref 1–4.8)
LYMPHOCYTES NFR BLD: 43.5 % (ref 18–48)
MAGNESIUM SERPL-MCNC: 2.1 MG/DL (ref 1.6–2.6)
MCH RBC QN AUTO: 28.3 PG (ref 27–31)
MCHC RBC AUTO-ENTMCNC: 32.4 G/DL (ref 32–36)
MCV RBC AUTO: 87 FL (ref 82–98)
MONOCYTES # BLD AUTO: 0.6 K/UL (ref 0.3–1)
MONOCYTES NFR BLD: 10.3 % (ref 4–15)
NEUTROPHILS # BLD AUTO: 2.4 K/UL (ref 1.8–7.7)
NEUTROPHILS NFR BLD: 42.2 % (ref 38–73)
NRBC BLD-RTO: 0 /100 WBC
PHOSPHATE SERPL-MCNC: 4.5 MG/DL (ref 2.7–4.5)
PLATELET # BLD AUTO: 308 K/UL (ref 150–450)
PMV BLD AUTO: 9.8 FL (ref 9.2–12.9)
POTASSIUM SERPL-SCNC: 3.6 MMOL/L (ref 3.5–5.1)
POTASSIUM SERPL-SCNC: 3.6 MMOL/L (ref 3.5–5.1)
RBC # BLD AUTO: 4.35 M/UL (ref 4–5.4)
SODIUM SERPL-SCNC: 140 MMOL/L (ref 136–145)
SODIUM SERPL-SCNC: 140 MMOL/L (ref 136–145)
WBC # BLD AUTO: 5.73 K/UL (ref 3.9–12.7)

## 2024-10-23 PROCEDURE — 84100 ASSAY OF PHOSPHORUS: CPT | Performed by: STUDENT IN AN ORGANIZED HEALTH CARE EDUCATION/TRAINING PROGRAM

## 2024-10-23 PROCEDURE — 80048 BASIC METABOLIC PNL TOTAL CA: CPT | Performed by: STUDENT IN AN ORGANIZED HEALTH CARE EDUCATION/TRAINING PROGRAM

## 2024-10-23 PROCEDURE — 83735 ASSAY OF MAGNESIUM: CPT | Performed by: STUDENT IN AN ORGANIZED HEALTH CARE EDUCATION/TRAINING PROGRAM

## 2024-10-23 PROCEDURE — 63600175 PHARM REV CODE 636 W HCPCS: Performed by: STUDENT IN AN ORGANIZED HEALTH CARE EDUCATION/TRAINING PROGRAM

## 2024-10-23 PROCEDURE — 25000003 PHARM REV CODE 250: Performed by: STUDENT IN AN ORGANIZED HEALTH CARE EDUCATION/TRAINING PROGRAM

## 2024-10-23 PROCEDURE — 85025 COMPLETE CBC W/AUTO DIFF WBC: CPT | Performed by: STUDENT IN AN ORGANIZED HEALTH CARE EDUCATION/TRAINING PROGRAM

## 2024-10-23 PROCEDURE — 63600175 PHARM REV CODE 636 W HCPCS

## 2024-10-23 PROCEDURE — 25000003 PHARM REV CODE 250

## 2024-10-23 PROCEDURE — 63600175 PHARM REV CODE 636 W HCPCS: Performed by: COLON & RECTAL SURGERY

## 2024-10-23 PROCEDURE — 86140 C-REACTIVE PROTEIN: CPT | Performed by: STUDENT IN AN ORGANIZED HEALTH CARE EDUCATION/TRAINING PROGRAM

## 2024-10-23 RX ORDER — HEPARIN 100 UNIT/ML
5 SYRINGE INTRAVENOUS ONCE
Status: COMPLETED | OUTPATIENT
Start: 2024-10-23 | End: 2024-10-23

## 2024-10-23 RX ORDER — OXYCODONE HYDROCHLORIDE 5 MG/1
5 TABLET ORAL EVERY 6 HOURS PRN
Qty: 5 TABLET | Refills: 0 | Status: SHIPPED | OUTPATIENT
Start: 2024-10-23

## 2024-10-23 RX ORDER — OXYCODONE HYDROCHLORIDE 5 MG/1
5 TABLET ORAL EVERY 4 HOURS PRN
Status: DISCONTINUED | OUTPATIENT
Start: 2024-10-23 | End: 2024-10-23 | Stop reason: HOSPADM

## 2024-10-23 RX ORDER — HEPARIN 100 UNIT/ML
100 SYRINGE INTRAVENOUS ONCE
Status: DISCONTINUED | OUTPATIENT
Start: 2024-10-23 | End: 2024-10-23 | Stop reason: HOSPADM

## 2024-10-23 RX ADMIN — SUCRALFATE 1 G: 1 SUSPENSION ORAL at 05:10

## 2024-10-23 RX ADMIN — GABAPENTIN 300 MG: 300 CAPSULE ORAL at 08:10

## 2024-10-23 RX ADMIN — PROMETHAZINE HYDROCHLORIDE 12.5 MG: 25 INJECTION INTRAMUSCULAR; INTRAVENOUS at 08:10

## 2024-10-23 RX ADMIN — HYDROMORPHONE HYDROCHLORIDE 1 MG: 1 INJECTION, SOLUTION INTRAMUSCULAR; INTRAVENOUS; SUBCUTANEOUS at 05:10

## 2024-10-23 RX ADMIN — HEPARIN 500 UNITS: 100 SYRINGE at 06:10

## 2024-10-23 RX ADMIN — OXYCODONE HYDROCHLORIDE 5 MG: 5 TABLET ORAL at 03:10

## 2024-10-23 RX ADMIN — ALUMINUM HYDROXIDE, MAGNESIUM HYDROXIDE, AND SIMETHICONE 30 ML: 200; 200; 20 SUSPENSION ORAL at 05:10

## 2024-10-23 RX ADMIN — SUCRALFATE 1 G: 1 SUSPENSION ORAL at 12:10

## 2024-10-23 RX ADMIN — SUCRALFATE 1 G: 1 SUSPENSION ORAL at 11:10

## 2024-10-23 RX ADMIN — PROCHLORPERAZINE EDISYLATE 5 MG: 5 INJECTION INTRAMUSCULAR; INTRAVENOUS at 01:10

## 2024-10-23 RX ADMIN — NADOLOL 40 MG: 40 TABLET ORAL at 08:10

## 2024-10-23 RX ADMIN — ENOXAPARIN SODIUM 40 MG: 40 INJECTION SUBCUTANEOUS at 04:10

## 2024-10-23 RX ADMIN — VALACYCLOVIR HYDROCHLORIDE 500 MG: 500 TABLET, FILM COATED ORAL at 08:10

## 2024-10-23 RX ADMIN — HYDROMORPHONE HYDROCHLORIDE 1 MG: 1 INJECTION, SOLUTION INTRAMUSCULAR; INTRAVENOUS; SUBCUTANEOUS at 12:10

## 2024-10-23 RX ADMIN — ALUMINUM HYDROXIDE, MAGNESIUM HYDROXIDE, AND SIMETHICONE 30 ML: 200; 200; 20 SUSPENSION ORAL at 03:10

## 2024-10-23 RX ADMIN — HYDROMORPHONE HYDROCHLORIDE 1 MG: 1 INJECTION, SOLUTION INTRAMUSCULAR; INTRAVENOUS; SUBCUTANEOUS at 10:10

## 2024-10-23 RX ADMIN — ALUMINUM HYDROXIDE, MAGNESIUM HYDROXIDE, AND SIMETHICONE 30 ML: 200; 200; 20 SUSPENSION ORAL at 10:10

## 2024-10-23 NOTE — PROGRESS NOTES
[facility]  Colorectal Surgery  Progress Note    Patient Name: Ivy Salgado  MRN: [id number]  Admission Date: 10/18/2024  Hospital Length of Stay: 0 days  Attending Physician: DUNCAN Campbell MD    Subjective:     Interval History: nausea better, less abdominal pain  Medications:  Continuous Infusions:   D5 and 0.9% NaCl   Intravenous Continuous 125 mL/hr at 10/19/24 0544 Rate Verify at 10/19/24 0544     Scheduled Meds:   aluminum-magnesium hydroxide-simethicone  30 mL Oral QID (AC & HS)    cyclobenzaprine  10 mg Oral QHS    enoxparin  40 mg Subcutaneous Daily    gabapentin  300 mg Oral BID    propranoloL  20 mg Oral BID    sucralfate  1 g Oral Q6H    tamsulosin  0.4 mg Oral Daily    valACYclovir  500 mg Oral Daily     PRN Meds:   aluminum-magnesium hydroxide-simethicone 200-200-20 mg/5 mL suspension 30 mL    cyclobenzaprine tablet 10 mg    enoxaparin injection 40 mg    gabapentin capsule 300 mg    propranoloL tablet 20 mg    sucralfate 100 mg/mL suspension 1 g    tamsulosin 24 hr capsule 0.4 mg    valACYclovir tablet 500 mg        Objective:     Wt Readings from Last 3 Encounters:   10/19/24 50 kg (110 lb 3.7 oz)   10/09/24 49.9 kg (110 lb)   09/13/24 49.9 kg (110 lb 0.2 oz)     Temp Readings from Last 3 Encounters:   10/23/24 97.6 °F (36.4 °C) (Oral)   10/09/24 97.9 °F (36.6 °C) (Oral)   09/10/24 97.9 °F (36.6 °C) (Temporal)     BP Readings from Last 3 Encounters:   10/23/24 113/77   10/09/24 112/73   09/13/24 100/70     Pulse Readings from Last 3 Encounters:   10/23/24 65   10/09/24 81   09/13/24 75     Intake/Output - Last 3 Shifts         10/21 0700  10/22 0659 10/22 0700  10/23 0659 10/23 0700  10/24 0659    I.V. (mL/kg) 3679.3 (73.6)      IV Piggyback 141.6      Total Intake(mL/kg) 3820.9 (76.4)      Urine (mL/kg/hr) 450 (0.4)      Stool 925 500     Total Output 1375 500     Net +2445.9 -500                    Physical Exam  Constitutional:       Appearance: Normal appearance.   HENT:      Head:  Normocephalic and atraumatic.   Eyes:      Extraocular Movements: Extraocular movements intact.   Cardiovascular:      Rate and Rhythm: Normal rate.   Pulmonary:      Effort: Pulmonary effort is normal. No respiratory distress.   Abdominal:      Soft, non distended, non tender to palpation, ostomy with liquid output  Skin:     General: Skin is warm and dry.   Neurological:      General: No focal deficit present.      Mental Status: She is alert and oriented to person, place, and time.       Lab Results   Component Value Date    WBC 5.73 10/23/2024    HGB 12.3 10/23/2024    HCT 38.0 10/23/2024    MCV 87 10/23/2024     10/23/2024       BMP  Lab Results   Component Value Date     10/23/2024     10/23/2024    K 3.6 10/23/2024    K 3.6 10/23/2024     10/23/2024     10/23/2024    CO2 25 10/23/2024    CO2 25 10/23/2024    BUN 6 10/23/2024    BUN 6 10/23/2024    CREATININE 0.7 10/23/2024    CREATININE 0.7 10/23/2024    CALCIUM 9.5 10/23/2024    CALCIUM 9.5 10/23/2024    ANIONGAP 11 10/23/2024    ANIONGAP 11 10/23/2024    EGFRNORACEVR >60.0 10/23/2024    EGFRNORACEVR >60.0 10/23/2024       Significant Diagnostics:  I have reviewed all pertinent imaging results/findings within the past 24 hours.  Assessment/Plan:     * Small bowel obstruction  Ms. Salgado is a 44 y.o F who presented with small bowel obstruction and was admitted to the colorectal surgery service due to history of Crohn's.    10/19 admit, having emesis, refusing NGT  10/20 still refusing NGT, seen by GI and refusing steroids  10/21 gastrograffin challenge  10/22 Contrast passed through into ostomy bag, will advance diet.    -low fiber diet  -appreciate GI recs  -likely Dc later today    Scarlett Cooper MD  Colon & Rectal Surgery Fellow

## 2024-10-23 NOTE — PLAN OF CARE
[facility]  Discharge Reassessment    Primary Care Provider: Luis Madden DO    Expected Discharge Date: 10/25/2024    Reassessment (most recent)       Discharge Reassessment - 10/23/24 1455          Discharge Reassessment    Assessment Type Discharge Planning Reassessment     Did the patient's condition or plan change since previous assessment? No     Discharge Plan discussed with: Patient     Communicated CHRISTOPH with patient/caregiver Yes     Discharge Plan A Home with family;Home     Discharge Plan B Home with family;Home     DME Needed Upon Discharge  none     Transition of Care Barriers None     Why the patient remains in the hospital Requires continued medical care        Post-Acute Status    Discharge Delays None known at this time                   Patient is not medically ready for discharge.  Per MD; nausea better, less abdominal pain       Discharge Plan A and Plan B have been determined by review of patient's clinical status, future medical and therapeutic needs, and coverage/benefits for post-acute care in coordination with multidisciplinary team members.     Gill Platt, MACIELSW   - [facility]

## 2024-10-23 NOTE — PLAN OF CARE
"[facility] Plan of Care Note    Dx:   Abdominal pain, unspecified abdominal location [R10.9]    Shift Events: Pt had large stool occ. In bed post ostomy bag bursting. Charted in flowsheet.     Goals of Care: Goals of care ongoing and progressing.     Neuro: A/Ox4     Vital Signs: BP (!) 128/93   Pulse 100   Temp 99 °F (37.2 °C) (Oral)   Resp 18   Ht 5' 1" (1.549 m)   Wt 50 kg (110 lb 3.7 oz)   LMP 03/30/2015   SpO2 97%   Breastfeeding No   BMI 20.83 kg/m²     Respiratory: RA    Diet: Diet Clear Liquid  Dietary nutrition supplements Boost Breeze - Any flavor    Is patient tolerating current diet? Yes    GTTS: NA    Urine Output/Bowel Movement:   No intake/output data recorded.  Last Bowel Movement: 10/22/24      Drains/Tubes/Tube Feeds (include total output/shift):   Ostomy bag       Lines: R chest port       Accuchecks:NA    Skin: Intact    Fall Risk Score: Low    Activity level? Independent     Any scheduled procedures? NA    Any safety concerns? NA      Problem: Adult Inpatient Plan of Care  Goal: Plan of Care Review  Outcome: Progressing  Goal: Patient-Specific Goal (Individualized)  Outcome: Progressing  Goal: Absence of Hospital-Acquired Illness or Injury  Outcome: Progressing  Goal: Optimal Comfort and Wellbeing  Outcome: Progressing  Goal: Readiness for Transition of Care  Outcome: Progressing     Problem: Pain Acute  Goal: Optimal Pain Control and Function  Outcome: Progressing     Problem: Fall Injury Risk  Goal: Absence of Fall and Fall-Related Injury  Outcome: Progressing     Problem: Infection  Goal: Absence of Infection Signs and Symptoms  Outcome: Progressing     Problem: Intestinal Obstruction  Goal: Optimal Bowel Function  Outcome: Progressing  Goal: Fluid and Electrolyte Balance  Outcome: Progressing  Goal: Absence of Infection Signs and Symptoms  Outcome: Progressing  Goal: Optimize Nutrition Status  Outcome: Progressing  Goal: Optimal Pain Control and Function  Outcome: Progressing     "

## 2024-10-23 NOTE — PLAN OF CARE
Problem: Adult Inpatient Plan of Care  Goal: Plan of Care Review  10/23/2024 1614 by Ami Quiroz RN  Outcome: Progressing  10/23/2024 1307 by Ami Quiroz RN  Outcome: Progressing     Problem: Pain Acute  Goal: Optimal Pain Control and Function  Outcome: Progressing     Problem: Fall Injury Risk  Goal: Absence of Fall and Fall-Related Injury  10/23/2024 1614 by Ami Quiroz RN  Outcome: Progressing  10/23/2024 1307 by Ami Quiroz RN  Outcome: Progressing     Problem: Intestinal Obstruction  Goal: Optimal Bowel Function  Outcome: Progressing

## 2024-10-23 NOTE — TREATMENT PLAN
IBD Discharge Treatment Plan:      Infusions/Injections (name/dose/frequency)  - Entyvio 300mg injection, next dose due 10/24      Please comply with a low fiber/low residue diet.   Avoid use of NSAIDS and tobacco products.     IBD Clinic Follow-up  Provider: Dr. Peace Garcia  Nurse contact: Sia Valdez RN  Date/Time: 12/13/2024  Phone: [phone]  Fax: [phone]    Be sure to fill out questionnaire prior to your visit and arrive 30 min prior to your visit time.     Please call your physician's office if your symptoms worsen (increased abdominal pain, large amount of blood in stool, change in bowel movements) please reach out to clinic if during business hours ([phone]) or proceed to the nearest Emergency Room for evaluation.    Jana Medina MD  Gastroenterology and Hepatology Fellow, PGY-4

## 2024-10-23 NOTE — PLAN OF CARE
[facility]  Discharge Final Note    Primary Care Provider: Luis Madden DO    Expected Discharge Date: 10/23/2024    Final Discharge Note (most recent)       Final Note - 10/23/24 1718          Final Note    Assessment Type Final Discharge Note     Anticipated Discharge Disposition Home or Self Care     What phone number can be called within the next 1-3 days to see how you are doing after discharge? [phone]     Hospital Resources/Appts/Education Provided Provided patient/caregiver with written discharge plan information        Post-Acute Status    Post-Acute Authorization Other     Other Status No Post-Acute Service Needs     Discharge Delays None known at this time                     Important Message from Medicare           Patient medically ready for discharge to home.  Family/patient aware of discharge.    Future Appointments   Date Time Provider Department Center   10/24/2024 11:00 AM CHAIR 03, [facility] [place]   10/28/2024 10:45 AM Sharon Babb MD [facility] SPORTS [place]   11/11/2024  2:30 PM Luis Madden DO [facility] IM [facility]   12/3/2024  8:20 AM Lexi Villalba MD [facility] URO [place]   12/13/2024  9:30 AM Peace Garcia MD [facility]   1/28/2025  2:30 PM Iris Simon MD [facility]ER[facility]         Gill Platt LMSW   - [facility]

## 2024-10-23 NOTE — PLAN OF CARE
Problem: Adult Inpatient Plan of Care  Goal: Plan of Care Review  10/23/2024 1728 by Ami Quiroz RN  Outcome: Met  10/23/2024 1614 by Ami Quiroz RN  Outcome: Progressing  10/23/2024 1307 by Ami Quiroz RN  Outcome: Progressing  Goal: Patient-Specific Goal (Individualized)  Outcome: Met  Goal: Absence of Hospital-Acquired Illness or Injury  Outcome: Met  Goal: Optimal Comfort and Wellbeing  Outcome: Met  Goal: Readiness for Transition of Care  Outcome: Met     Problem: Pain Acute  Goal: Optimal Pain Control and Function  10/23/2024 1728 by Ami Quiroz RN  Outcome: Met  10/23/2024 1307 by Ami Quiroz RN  Outcome: Progressing     Problem: Fall Injury Risk  Goal: Absence of Fall and Fall-Related Injury  10/23/2024 1728 by Ami Quiroz RN  Outcome: Met  10/23/2024 1614 by Ami Quiroz RN  Outcome: Progressing  10/23/2024 1307 by Ami Quiroz RN  Outcome: Progressing     Problem: Infection  Goal: Absence of Infection Signs and Symptoms  Outcome: Met     Problem: Intestinal Obstruction  Goal: Optimal Bowel Function  10/23/2024 1728 by Ami Quiroz RN  Outcome: Met  10/23/2024 1614 by Ami Quiroz RN  Outcome: Progressing  Goal: Fluid and Electrolyte Balance  Outcome: Met  Goal: Absence of Infection Signs and Symptoms  Outcome: Met  Goal: Optimize Nutrition Status  Outcome: Met  Goal: Optimal Pain Control and Function  Outcome: Met

## 2024-10-23 NOTE — NURSING
[place] accessed by charge nurse per md order. Pt tolerated well. Discharge paperwork and instructions given. All questions answered. Meds delivered from pharm. Pt refusing pt transport. Pt educated. Pt ambulating self off floor with all belongings with friend.

## 2024-10-24 ENCOUNTER — PATIENT MESSAGE (OUTPATIENT)
Dept: SURGERY | Facility: CLINIC | Age: 44
End: 2024-10-24
Payer: COMMERCIAL

## 2024-10-24 NOTE — DISCHARGE SUMMARY
COLON & RECTAL SURGERY DISCHARGE SUMMARY    Patient Name:        Ivy Salgado  MRN:          [id number]  YOB: 1980  (44 y.o.)  Discharge Date:        10/24/2024    HPI:  44F hx Crohn's s/p TAC end ileostomy and multiple SBOs in the past p/w 2 days of progressive abdominal pain, nausea, vomiting, and decreased stoma output. She states it feels like previous SBOs. In the ED, workup revealed an SBO. For this, CRS was called for further management.   CT:  Multiple fluid-air filled loops of dilated small bowel in the right hemiabdomen and pelvis with suspected narrowing at a surgical anastomosis.  No definite transition point elsewhere.  Findings may represent small bowel obstruction.  Correlate clinically.  A 4 cm segment of the right lower quadrant small bowel demonstrates findings suspicious for active segmental enteritis and/or small bowel stricture.  Other underlying pathology not fully excluded.  Correlate clinically, and with follow-up imaging.    Course:  10/19 admit, having emesis, refusing NGT  10/20 still refusing NGT, seen by GI and refusing steroids they recommended  10/21 gastrograffin challenge  10/22 Contrast passed through into ostomy bag, advance diet  10/23 tolerated po, nausea and pain improved, DC home    Discharge:  Home with follow up with Dr. Campbell and GI in early December, entyvio dose on 10/24    Instructions:   Medications: Okay to restart your other home meds. Okay to take entyvio.    Bowel Function: Diarrhea and loose stool are normal and expected after bowel obstructions. Bowel function initially tends to be erratic (increased frequency, gas, liquid/loose consistency, seepage, urgency), but it will improve over the next several months as your body adjusts to the surgical changes.    Diet: Unless directed otherwise by your surgeon, after surgery you should do the following:  Eat several (4-6) small meals each day.  If you experience difficulty eating, add in  supplemental drinks (Boost®, Ensure®, Glucerna®).  If you are having loose stools, your diet should include foods to add bulk to the stool such as applesauce, bananas, cheese, peanut butter, pasta, and potatoes.  Follow a Low Fiber Diet for six (6) weeks. Avoid particular foods including the following:  Raw vegetables, beans, corn, mushrooms, legumes, peas, potato skins, sauerkraut, stewed tomatoes, brussels sprouts  Fresh fruit, dried fruit (such as raisins or prunes), coconut, juices with pulp. (It is okay to eat fruits such as bananas, melons, canned fruits, and avocado.)  Meat with casings (such as hot dogs, kielbasa, sausage), shellfish  Nuts, popcorn, seeds, chunky peanut butter  Coarse whole grains including breads/rolls with nuts, cereals with nuts, coarse whole grains, poppy, sesame seeds    Activity: Walking is encouraged. Light aerobic activity such as climbing stairs or leisurely bike riding is acceptable but listen to your body and let pain be your guide when reintroducing activity. If it hurts, don't do it.     Driving: You should not drive a vehicle while taking narcotic pain medications.    Potential Problems:   Bowel obstruction or ileus is characterized by persistent abdominal cramps, bloating, constipation, nausea, or vomiting. If the symptoms are mild, you should restrict your dietary intake to only liquids, and avoid solid food for 2-3 days. If the symptoms are more severe or persist beyond 24 hours, please call your surgeon's office for advice.    Frequent stools and loose stools are best managed by using bulking agents or anti-diarrheal medication. Please call your surgeon or GI doctor if diarrhea is not improving after a few weeks to discuss starting one of the medications below.  Benefiber®, Citrucel®, Fibercon®, Konsyl®, and Metamucil® are bulking agents that are available at most grocery stores and pharmacies. The medication should be mixed using one (1) teaspoon of the powder in the  minimum amount of fluid required to dissolve the agent and taken 1-2 times each day. Benefiber® can be alternatively sprinkled over food 1-2 times each day.  Imodium® (loperamide) is also available without a prescription. It is most effective if taken before meals. You should not take more than eight (8) tablets (32 mg) of Imodium in a 24-hour period. Please call your surgeon's office if you start taking Imodium®.     Dehydration can commonly occur, and its symptoms or signs include dark urine, dizziness when standing, dry mouth, increased heart rate, leg cramps, and low volumes (less than 800 ml) of urine. If these occur, you should immediately call your surgeon's office. To avoid dehydration, you should do the following:  Drink a variety of fluids. Use an oral rehydration salt solution like Pedialyte, G2 Gatorade, Nuun tabs, etc. and sip the solution between meals.  Eat salty foods or add salt to your food.  Use an anti-diarrheal medication or bulking agent as previously instructed by your surgeon.

## 2024-10-25 ENCOUNTER — PATIENT OUTREACH (OUTPATIENT)
Dept: ADMINISTRATIVE | Facility: CLINIC | Age: 44
End: 2024-10-25
Payer: COMMERCIAL

## 2024-10-25 NOTE — PROGRESS NOTES
C3 nurse attempted to contact Ivy Salgado  for a TCC post hospital discharge follow up call. No answer. Left voicemail with callback information. The patient does not have a scheduled HOSFU appointment. Message sent to PCP staff for assistance with scheduling visit with patient.

## 2024-10-28 ENCOUNTER — TELEPHONE (OUTPATIENT)
Dept: INTERNAL MEDICINE | Facility: CLINIC | Age: 44
End: 2024-10-28
Payer: COMMERCIAL

## 2024-10-29 ENCOUNTER — OUTPATIENT CASE MANAGEMENT (OUTPATIENT)
Dept: ADMINISTRATIVE | Facility: OTHER | Age: 44
End: 2024-10-29
Payer: COMMERCIAL

## 2024-10-29 ENCOUNTER — PATIENT MESSAGE (OUTPATIENT)
Dept: SURGERY | Facility: CLINIC | Age: 44
End: 2024-10-29
Payer: COMMERCIAL

## 2024-10-29 ENCOUNTER — NURSE TRIAGE (OUTPATIENT)
Dept: ADMINISTRATIVE | Facility: CLINIC | Age: 44
End: 2024-10-29
Payer: COMMERCIAL

## 2024-10-31 ENCOUNTER — PATIENT MESSAGE (OUTPATIENT)
Dept: SURGERY | Facility: CLINIC | Age: 44
End: 2024-10-31

## 2024-10-31 ENCOUNTER — OFFICE VISIT (OUTPATIENT)
Dept: SURGERY | Facility: CLINIC | Age: 44
End: 2024-10-31
Payer: COMMERCIAL

## 2024-10-31 VITALS
HEART RATE: 74 BPM | BODY MASS INDEX: 21.69 KG/M2 | HEIGHT: 61 IN | SYSTOLIC BLOOD PRESSURE: 132 MMHG | WEIGHT: 114.88 LBS | DIASTOLIC BLOOD PRESSURE: 74 MMHG

## 2024-10-31 DIAGNOSIS — K50.012 CROHN'S DISEASE OF SMALL INTESTINE WITH INTESTINAL OBSTRUCTION: Primary | ICD-10-CM

## 2024-10-31 PROCEDURE — 3078F DIAST BP <80 MM HG: CPT | Mod: CPTII,S$GLB,, | Performed by: COLON & RECTAL SURGERY

## 2024-10-31 PROCEDURE — 3044F HG A1C LEVEL LT 7.0%: CPT | Mod: CPTII,S$GLB,, | Performed by: COLON & RECTAL SURGERY

## 2024-10-31 PROCEDURE — 3008F BODY MASS INDEX DOCD: CPT | Mod: CPTII,S$GLB,, | Performed by: COLON & RECTAL SURGERY

## 2024-10-31 PROCEDURE — 1159F MED LIST DOCD IN RCRD: CPT | Mod: CPTII,S$GLB,, | Performed by: COLON & RECTAL SURGERY

## 2024-10-31 PROCEDURE — 3075F SYST BP GE 130 - 139MM HG: CPT | Mod: CPTII,S$GLB,, | Performed by: COLON & RECTAL SURGERY

## 2024-10-31 PROCEDURE — 99213 OFFICE O/P EST LOW 20 MIN: CPT | Mod: S$GLB,,, | Performed by: COLON & RECTAL SURGERY

## 2024-10-31 PROCEDURE — 1160F RVW MEDS BY RX/DR IN RCRD: CPT | Mod: CPTII,S$GLB,, | Performed by: COLON & RECTAL SURGERY

## 2024-10-31 PROCEDURE — 1111F DSCHRG MED/CURRENT MED MERGE: CPT | Mod: CPTII,S$GLB,, | Performed by: COLON & RECTAL SURGERY

## 2024-10-31 PROCEDURE — 99999 PR PBB SHADOW E&M-EST. PATIENT-LVL IV: CPT | Mod: PBBFAC,,, | Performed by: COLON & RECTAL SURGERY

## 2024-10-31 RX ORDER — DICYCLOMINE HYDROCHLORIDE 20 MG/1
20 TABLET ORAL EVERY 6 HOURS PRN
Qty: 60 TABLET | Refills: 4 | Status: SHIPPED | OUTPATIENT
Start: 2024-10-31 | End: 2024-11-30

## 2024-11-03 DIAGNOSIS — K50.018 CROHN'S DISEASE OF SMALL INTESTINE WITH OTHER COMPLICATION: ICD-10-CM

## 2024-11-04 ENCOUNTER — OUTPATIENT CASE MANAGEMENT (OUTPATIENT)
Dept: ADMINISTRATIVE | Facility: OTHER | Age: 44
End: 2024-11-04
Payer: COMMERCIAL

## 2024-11-04 RX ORDER — PROMETHAZINE HYDROCHLORIDE 25 MG/1
25 TABLET ORAL EVERY 6 HOURS PRN
Qty: 30 TABLET | Refills: 0 | Status: SHIPPED | OUTPATIENT
Start: 2024-11-04

## 2024-11-04 NOTE — TELEPHONE ENCOUNTER
"Per 8/13/24 Dr Garcia note "- other medications: liquid imodium PRN; phenergan PRN for nausea ~2x/week"  Allergies reviewed  Labs UTD  Next Dr Garcia OV 12/13/24  Rx pended for approval  "

## 2024-11-05 ENCOUNTER — PATIENT MESSAGE (OUTPATIENT)
Dept: WOUND CARE | Facility: CLINIC | Age: 44
End: 2024-11-05
Payer: COMMERCIAL

## 2024-11-06 ENCOUNTER — TELEPHONE (OUTPATIENT)
Dept: ADMINISTRATIVE | Facility: CLINIC | Age: 44
End: 2024-11-06
Payer: COMMERCIAL

## 2024-11-06 ENCOUNTER — PATIENT MESSAGE (OUTPATIENT)
Dept: SURGERY | Facility: CLINIC | Age: 44
End: 2024-11-06
Payer: COMMERCIAL

## 2024-11-06 ENCOUNTER — PATIENT OUTREACH (OUTPATIENT)
Dept: ADMINISTRATIVE | Facility: OTHER | Age: 44
End: 2024-11-06
Payer: COMMERCIAL

## 2024-11-06 ENCOUNTER — OFFICE VISIT (OUTPATIENT)
Dept: GASTROENTEROLOGY | Facility: CLINIC | Age: 44
End: 2024-11-06
Payer: COMMERCIAL

## 2024-11-06 DIAGNOSIS — K86.2 PANCREAS CYST: Primary | ICD-10-CM

## 2024-11-06 PROCEDURE — 99999 PR PBB SHADOW E&M-EST. PATIENT-LVL I: CPT | Mod: PBBFAC,,,

## 2024-11-06 PROCEDURE — 99214 OFFICE O/P EST MOD 30 MIN: CPT | Mod: S$GLB,,,

## 2024-11-06 PROCEDURE — 3044F HG A1C LEVEL LT 7.0%: CPT | Mod: CPTII,S$GLB,,

## 2024-11-06 NOTE — PROGRESS NOTES
Pt states she has not had a paycheck in a while and things are getting tight. Reminded pt to file for FMLA and short term disability. Sending pt financial assistance application and will follow up with any more needed concerns. I will continue to follow on [facility] platform.

## 2024-11-06 NOTE — PROGRESS NOTES
CHW - Initial Contact    This Community Health Worker completed OR updated the Social Determinant of Health questionnaire with patient via telephone today.    Pt identified barriers of most importance are: Pt states she has not had a paycheck in a while and things are getting tight. Reminded pt to file for FMLA and short term disability. Sending pt financial assistance application and will follow up with any more needed concerns.    Referrals to community agencies completed with patient/caregiver consent outside of [facility] include: yes  Referrals were put through [facility] - no:   Support and Services: Financial Aid/Education  Other information discussed the patient needs / wants help with: SDOH   Follow up required: yes  Follow-up Outreach - Due: 11/20/2024

## 2024-11-06 NOTE — TELEPHONE ENCOUNTER
2nd Attempt made to reach patient for Tracker call. Left voicemail please select option 5 if assistance is still needed with follow up appointment.

## 2024-11-06 NOTE — TELEPHONE ENCOUNTER
I spoke with the patient she confirmed she did not need any assistance with a HOSFU. Patient had other concerns Kate Gregg will follow up.

## 2024-11-06 NOTE — TELEPHONE ENCOUNTER
1st Attempt made to reach patient for Tracker call. Left voicemail please select option 5 if assistance is still needed with follow up appointment.

## 2024-11-06 NOTE — PROGRESS NOTES
Gastroenterology: [facility]      SUBJECTIVE:         Chief Complaint: Here for evaluation of a pancreatic cyst     History of Present Illness:  Patient is a 44 y.o. female with pmhx of Crohn's disease presents with a pancreatic cyst. The cyst was first noted on MRI 01/04/2024 which demonstrated punctate cyst measuring 3 mm in the pancreatic tail appearing to communicate with the main pancreatic duct, likely an IPMN. Too small to characterize although no suspicious nodular enhancement and no pancreatic ductal dilatation. She has underwent multiple abdominal CT since the initial imaging for other reasons that made no mention of pancreas cyst. It's located in the the tail.  It measures 3 mm in size on the most recent imaging.  It is not symptomatic. Previous studies include CT scan and MRI.   Since initial detection, the cyst has not increased in size. The pancreatic duct is not dilated. Previous FNA of the cyst has not been done      Prior Imaging:    MRI Abdomen 1/04/2024  FINDINGS:  Inferior thorax: Bilateral breast implants.     Liver: Mildly enlarged in size.  Normal homogeneous background signal.  No focal lesions.  Hepatic and portal veins are patent.     Biliary: Gallbladder is within normal limits.  Mildly prominent proximal common duct which tapers normally distally.  No choledocholithiasis or obstructing lesion.     Pancreas: No solid lesion.  Punctate cyst measuring 3 mm in the pancreatic tail appears to communicate with the main pancreatic duct, likely an IPMN (axial series 18, image 39).  Too small to characterize although no suspicious nodular enhancement.  No pancreatic ductal dilatation.     Spleen: Normal size.  No focal lesions.     Adrenal glands: Unremarkable.     Kidneys: Small bilateral renal cysts.  No renal masses.  No hydronephrosis.     Miscellaneous: Surgical changes of the bowel with a right lower quadrant ostomy.  Dystrophic calcifications in the right gluteal  subcutaneous soft tissues.  No evidence for lymphadenopathy.        Personal/family factors:    There is a family history of pancreatic cancer. Uncle passed away from pancreatic cancer     The patient does not smoke.    ECOG status 0 - Asymptomatic        Review of Systems   Constitutional: no fever, chills or change in weight   Eyes: no visual changes   ENT: no sore throat or dysphagia  Respiratory: no cough or shortness of breath   Cardiovascular: no chest pain or palpitations   Gastrointestinal: as per HPI  Hematologic/Lymphatic: no easy bruising or lymphadenopathy   Musculoskeletal: no arthralgias or myalgias   Neurological: no seizures, tremors or change in mental status  Behavioral/Psych: no auditory or visual hallucinations    Past Medical History:   Diagnosis Date    Abnormal Pap smear 2007    Alkaline phosphatase elevation- based on lab from 4/9/2019 05/28/2019    Arthritis     Avascular necrosis of bone of hip, left     Bacterial vaginosis     Crohn disease     Depression 08/05/2017    Drug-induced pancreatitis (imuran)     Encounter for blood transfusion     Generalized anxiety disorder     Genital HSV     GERD (gastroesophageal reflux disease)     Hypertension     Ileostomy in place 07/09/2012    Kidney stone     Long QT syndrome     Melanoma in situ (excised-buttocks 2018, para-stomal site 2019)     Moderate episode of recurrent major depressive disorder 02/02/2024    Multiple thyroid nodules 11/27/2018    Osteopenia of multiple sites 01/07/2019    Pancreas cyst (tail, 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)     Recurrent Clostridioides difficile diarrhea     Recurrent UTI 04/03/2013       Past Surgical History:   Procedure Laterality Date    ABDOMINAL SURGERY      APPENDECTOMY      ARTHROPLASTY OF SHOULDER Left 7/18/2023    Procedure: ARTHROPLASTY, SHOULDER;  Surgeon: Sharon Babb MD;  Location: [facility];  Service: Orthopedics;  Laterality: Left;    AUGMENTATION OF BREAST Bilateral 06/2022    gel implants    BILATERAL  SALPINGO-OOPHORECTOMY (BSO) Bilateral 05/30/2019    Procedure: SALPINGO-OOPHORECTOMY, BILATERAL;  Surgeon: Rupa German MD;  Location: [facility] OR 2ND FLR;  Service: OB/GYN;  Laterality: Bilateral;    BLADDER SURGERY      partial cystectomy due to fistula    breast lift      BREAST SURGERY      [facility]      COLON SURGERY      COLONOSCOPY      CYSTOSCOPY  09/23/2020    Procedure: CYSTOSCOPY;  Surgeon: Sascha Florentino MD;  Location: [facility] OR 1ST FLR;  Service: Urology;;    CYSTOSCOPY W/ URETERAL STENT PLACEMENT Left 09/15/2020    Procedure: CYSTOSCOPY, WITH URETERAL STENT INSERTION;  Surgeon: Sascha Florentino MD;  [facility];  Service: Urology;  Laterality: Left;    DIAGNOSTIC LAPAROSCOPY N/A 07/09/2020    Procedure: LAPAROSCOPY, DIAGNOSTIC;  Surgeon: Gilson El MD;  Location: [facility] OR;  Service: OB/GYN;  Laterality: N/A;    ESOPHAGOGASTRODUODENOSCOPY N/A 1/3/2024    Procedure: EGD (ESOPHAGOGASTRODUODENOSCOPY);  Surgeon: Lily Lind MD;  Location: [facility] (2ND FLR);  Service: Endoscopy;  Laterality: N/A;    EXCISION OF MELANOMA  07/17/2019    ILEOSCOPY N/A 1/3/2024    Procedure: ILEOSCOPY;  Surgeon: Lily Lind MD;  Location: [facility] ENDO (2ND FLR);  Service: Endoscopy;  Laterality: N/A;    ILEOSCOPY N/A 1/24/2024    Procedure: ILEOSCOPY;  Surgeon: Lilian Navarro MD;  Location: [facility] ENDO (2ND FLR);  Service: Endoscopy;  Laterality: N/A;  through stoma    ILEOSCOPY N/A 6/4/2024    Procedure: ILEOSCOPY;  Surgeon: ePace Garcia MD;  Location: [facility] ENDO (4TH FLR);  Service: Endoscopy;  Laterality: N/A;  Ref By:kaleb Stallworth sent via portal,  received urgent message to reschedule pt from 7/15  to may or june-updated prep instructions sent-   5/29/24- precall complete - ERW    ILEOSTOMY      INSERTION OF TUNNELED CENTRAL VENOUS CATHETER (CVC) WITH SUBCUTANEOUS PORT Right 9/10/2024    Procedure: INSERTION, SINGLE LUMEN CATHETER WITH PORT, WITH FLUOROSCOPIC GUIDANCE, Rt neck  or chest, prefers chest;  Surgeon: Vinh Lloyd MD;  Location: [facility] OR 2ND FLR;  Service: General;  Laterality: Right;    LAPAROSCOPIC LYSIS OF ADHESIONS N/A 07/09/2020    Procedure: LYSIS, ADHESIONS, LAPAROSCOPIC;  Surgeon: Gilson El MD;  [facility];  Service: OB/GYN;  Laterality: N/A;    LASER LITHOTRIPSY  09/23/2020    Procedure: LITHOTRIPSY, USING LASER;  Surgeon: Sascha Florentino MD;  Location: [facility] OR 1ST FLR;  Service: Urology;;    LYSIS OF ADHESIONS N/A 05/30/2019    Procedure: LYSIS, ADHESIONS;  Surgeon: Rupa German MD;  Location: [facility] OR 2ND FLR;  Service: OB/GYN;  Laterality: N/A;    MEDIPORT REMOVAL Left 9/10/2024    Procedure: REMOVAL, CATHETER, CENTRAL VENOUS, TUNNELED, WITH PORT, Left side;  Surgeon: Vinh Lloyd MD;  [facility];  Service: General;  Laterality: Left;    OOPHORECTOMY Right 04/16/2015    PORTACATH PLACEMENT  02/21/2017    SKIN BIOPSY      SMALL INTESTINE SURGERY      age 18 Y    TOTAL ABDOMINAL HYSTERECTOMY  04/16/2015    TOTAL COLECTOMY      TUBAL LIGATION  06/06/2012    UPPER GASTROINTESTINAL ENDOSCOPY      URETEROSCOPIC REMOVAL OF URETERIC CALCULUS  09/23/2020    Procedure: REMOVAL, CALCULUS, URETER, URETEROSCOPIC;  Surgeon: Sascha Florentino MD;  [facility];  Service: Urology;;    URETEROSCOPY Left 09/23/2020    Procedure: URETEROSCOPY;  Surgeon: Sascha Florentino MD;  [facility];  Service: Urology;  Laterality: Left;       Family History   Problem Relation Name Age of Onset    Diabetes Paternal Grandfather Stephen     Hearing loss Paternal Grandmother Viviane     Cancer Maternal Grandfather Philippe         Skin    Skin cancer Maternal Grandfather Philippe     Diabetes Maternal Grandfather Philippe     Heart disease Maternal Grandfather Philippe     Colon cancer Father Milan     Cancer Father Milan         Colon    Liver cancer Father Milan     Hyperlipidemia Father Milan     Hypertension Mother Shaila      Crohn's disease Brother      Endometrial cancer Maternal Aunt      Breast cancer Maternal Cousin  41    Crohn's disease Daughter      Ovarian cancer Neg Hx      Melanoma Neg Hx         Social History     Socioeconomic History    Marital status: Single   Occupational History     Employer: [employer]   Tobacco Use    Smoking status: Never    Smokeless tobacco: Never   Substance and Sexual Activity    Alcohol use: Not Currently     Alcohol/week: 0.0 standard drinks of alcohol    Drug use: No    Sexual activity: Yes     Partners: Male     Birth control/protection: See Surgical Hx     Comment: HYST   Other Topics Concern    Are you pregnant or think you may be? No    Breast-feeding No     Social Drivers of Health     Financial Resource Strain: Low Risk  (10/19/2024)    Overall Financial Resource Strain (CARDIA)     Difficulty of Paying Living Expenses: Not hard at all   Food Insecurity: No Food Insecurity (10/19/2024)    Hunger Vital Sign     Worried About Running Out of Food in the Last Year: Never true     Ran Out of Food in the Last Year: Never true   Transportation Needs: No Transportation Needs (10/19/2024)    TRANSPORTATION NEEDS     Transportation : No   Physical Activity: Insufficiently Active (1/17/2024)    Exercise Vital Sign     Days of Exercise per Week: 3 days     Minutes of Exercise per Session: 30 min   Stress: No Stress Concern Present (10/19/2024)    [employer] [place] [employer] - Occupational Stress Questionnaire     Feeling of Stress : Not at all   Housing Stability: Low Risk  (10/19/2024)    Housing Stability Vital Sign     Unable to Pay for Housing in the Last Year: No     Homeless in the Last Year: No       Current Outpatient Medications on File Prior to Visit   Medication Sig Dispense Refill    clonazePAM (KLONOPIN) 1 MG tablet Take 1 tablet (1 mg total) by mouth 2 (two) times daily. 60 tablet 2    cyclobenzaprine (FLEXERIL) 10 MG tablet Take 1 tablet (10 mg total) by  mouth every evening as needed for muscle pain 30 tablet 2    dicyclomine (BENTYL) 20 mg tablet Take 1 tablet (20 mg total) by mouth every 6 (six) hours as needed (cramping). 60 tablet 4    droNABinol (MARINOL) 10 MG capsule Take 1 capsule (10 mg total) by mouth once daily. 90 capsule 2    estradiol 0.025 mg/24 hr 0.025 mg/24 hr Place 1 patch onto the skin once a week. (Patient taking differently: Place 1 patch onto the skin once a week. CHANGES ON MONDAYS) 4 patch 11    gabapentin (NEURONTIN) 300 MG capsule Take 1 capsule (300 mg total) by mouth 2 (two) times daily. 60 capsule 4    loperamide (IMODIUM) 1 mg/7.5 mL solution Take 4 mg by mouth 3 (three) times daily as needed for Diarrhea.      mirtazapine (REMERON SOL-TAB) 30 MG disintegrating tablet Take 1 tablet (30 mg total) by mouth nightly. 90 tablet 2    multivitamin (THERAGRAN) per tablet Take 1 tablet by mouth once daily.      nadoloL (CORGARD) 40 MG tablet Take 1 tablet (40 mg total) by mouth once daily. Patient needs appointment before next refill. 90 tablet 7    oxyCODONE (ROXICODONE) 5 MG immediate release tablet Take 1 tablet (5 mg total) by mouth every 6 (six) hours as needed for Pain. 5 tablet 0    pantoprazole (PROTONIX) 40 MG tablet Take 1 tablet (40 mg total) by mouth 2 (two) times daily. 60 tablet 12    promethazine (PHENERGAN) 25 MG tablet Take 1 tablet (25 mg total) by mouth every 6 (six) hours as needed for Nausea. 30 tablet 0    tamsulosin (FLOMAX) 0.4 mg Cap Take 1 capsule (0.4 mg total) by mouth once daily. 30 capsule 1    valACYclovir (VALTREX) 500 MG tablet Take 1 tablet (500 mg total) by mouth once daily. 90 tablet 1    vedolizumab (ENTYVIO) 300 mg SolR injection Inject 300 mg into the vein every 8 weeks.      zolpidem (AMBIEN) 10 mg Tab Take 1 tablet (10 mg total) by mouth every evening. 30 tablet 2     No current facility-administered medications on file prior to visit.       Review of patient's allergies indicates:   Allergen Reactions     Azathioprine sodium Other (See Comments)     Other reaction(s): pancreatitis  Other reaction(s): pancreatitis    Methotrexate analogues Other (See Comments)     leukopenia    Stelara [ustekinumab] Other (See Comments)     Multiple infections    Zofran [ondansetron hcl (pf)] Other (See Comments)     Per patient causes prolong QT    Vancomycin analogues Other (See Comments)     Made her red    Azathioprine      Other reaction(s): Unknown    Methotrexate      Other reaction(s): infection-    Morphine Itching and Other (See Comments)     Other reaction(s): Itching    Zofran [ondansetron hcl]      Other reaction(s): Hives    Bactrim [sulfamethoxazole-trimethoprim] Palpitations    Ciprofloxacin Palpitations       Physical Exam:  General: Well-developed, well-appearing, no acute distress  Neuro: alert and oriented to person, place, time; normal appearing gait  Eyes: No scleral icterus, pupils equally round, normal-appearing conjunctiva  Neck: supple; no cervical lymphadenopathy  CVS: regular rate and rhythm; no murmurs  Lungs: clear to auscultation bilaterally; no labored breathing, no wheezes  Abdomen: soft, non-distended, non-tender, no rebound tenderness or guarding, nomal bowel sounds  Extremities: no cyanosis, edema, or clubbing  Skin: no rash, no jaundice        Laboratory:   Lab Results   Component Value Date    ALT 32 10/18/2024    AST 48 (H) 10/18/2024    ALKPHOS 150 10/18/2024    BILITOT 0.3 10/18/2024     Lab Results   Component Value Date    WBC 5.73 10/23/2024    HGB 12.3 10/23/2024    HCT 38.0 10/23/2024    MCV 87 10/23/2024     10/23/2024     Lab Results   Component Value Date    INR 1.0 07/16/2024    INR 1.0 01/10/2024    INR 1.0 01/07/2024           Diagnostic/Imaging Results:  As above    ASSESSMENT/PLAN:     Pancreatic cyst in the the tail. Measuring 3 mm in size,  unchanged  Most likely etiology at this point is a  undetermined, 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    We discussed in detail the natural history of  pancreatic cysts, the therapy involved, and the benefits of multimodality surveillance imaging including Ct, MRI, and EUS with FNA    1. Pancreas cyst        Plan:     Orders Placed This Encounter    MRI Abdomen W WO Contrast       3mm cyst in the pancreatic tail seen on MRI 01/04/2024. Multiple CT's since then made no mention of the cyst. According to [place] pancreatic cyst guidelines, a subcentimeter cyst with no concerning findings should undergo CT/MRI in 6 months then every 2 years  Ordered MRI to assess cyst for any growth or changes since last imaging. If no changes, will tentatively plan for yearly surveillance         Malcolm Lees PA-C  Department of Advanced Endoscopy  [facility]

## 2024-11-07 ENCOUNTER — PATIENT MESSAGE (OUTPATIENT)
Dept: SURGERY | Facility: CLINIC | Age: 44
End: 2024-11-07
Payer: COMMERCIAL

## 2024-11-07 ENCOUNTER — TELEPHONE (OUTPATIENT)
Dept: SURGERY | Facility: CLINIC | Age: 44
End: 2024-11-07
Payer: COMMERCIAL

## 2024-11-07 ENCOUNTER — HOSPITAL ENCOUNTER (OUTPATIENT)
Dept: RADIOLOGY | Facility: HOSPITAL | Age: 44
Discharge: HOME | End: 2024-11-07
Attending: COLON & RECTAL SURGERY
Payer: COMMERCIAL

## 2024-11-07 ENCOUNTER — PATIENT OUTREACH (OUTPATIENT)
Dept: ADMINISTRATIVE | Facility: OTHER | Age: 44
End: 2024-11-07
Payer: COMMERCIAL

## 2024-11-07 DIAGNOSIS — K50.012 CROHN'S DISEASE OF SMALL INTESTINE WITH INTESTINAL OBSTRUCTION: ICD-10-CM

## 2024-11-07 PROCEDURE — 74183 MRI ABD W/O CNTR FLWD CNTR: CPT | Mod: 26,,, | Performed by: RADIOLOGY

## 2024-11-07 PROCEDURE — 25500020 PHARM REV CODE 255: Performed by: COLON & RECTAL SURGERY

## 2024-11-07 PROCEDURE — A9585 GADOBUTROL INJECTION: HCPCS | Performed by: COLON & RECTAL SURGERY

## 2024-11-07 PROCEDURE — 72197 MRI PELVIS W/O & W/DYE: CPT | Mod: 26,,, | Performed by: RADIOLOGY

## 2024-11-07 PROCEDURE — 72197 MRI PELVIS W/O & W/DYE: CPT | Mod: TC

## 2024-11-07 RX ORDER — GADOBUTROL 604.72 MG/ML
10 INJECTION INTRAVENOUS
Status: COMPLETED | OUTPATIENT
Start: 2024-11-07 | End: 2024-11-07

## 2024-11-07 RX ORDER — HEPARIN 100 UNIT/ML
SYRINGE INTRAVENOUS
Status: DISCONTINUED
Start: 2024-11-07 | End: 2024-11-08 | Stop reason: HOSPADM

## 2024-11-07 RX ADMIN — GADOBUTROL 10 ML: 604.72 INJECTION INTRAVENOUS at 03:11

## 2024-11-07 NOTE — TELEPHONE ENCOUNTER
Left voicemail with callback number regarding call about IV access. Instructed to call back or message via portal.

## 2024-11-07 NOTE — TELEPHONE ENCOUNTER
----- Message from Cara sent at 11/7/2024  2:02 PM CST -----  Regarding: PT ADVICE  Contact: [phone]  Pt is returning a missed call from someone in the office and is asking for a return call back soon. Thanks.     Reason for call:MISS CALL      Patient's DX:     Patient requesting call back or [facility] msg: PT@ [phone]

## 2024-11-07 NOTE — TELEPHONE ENCOUNTER
----- Message from Jeanine Lynn sent at 11/7/2024  1:52 PM CST -----  Regarding: Advise  Contact: [phone]  Type:  Needs Medical Advice    Who Called: Patient    Would the patient rather a call back or a response via [facility]? Call Back    Best Call Back Number: [phone]    Additional Information: Patient is calling stating that she is unable to get MRI scheduled today 11/7/24 because they are unable to get IV per pt. Please give pt a call to further discuss.

## 2024-11-08 ENCOUNTER — TELEPHONE (OUTPATIENT)
Dept: GASTROENTEROLOGY | Facility: CLINIC | Age: 44
End: 2024-11-08
Payer: COMMERCIAL

## 2024-11-08 NOTE — TELEPHONE ENCOUNTER
----- Message from Peace Garcia MD sent at 11/8/2024 10:47 AM CST -----  Hi team  Please call pt and let her know that Dr. Campbell sent me her MRE results and there was some concern for inflammation though difficult to know if this is accurate given MRE can sometimes not be accurate with ostomy.     If she doesn't have any contraindications for 4th floor procedure please schedule her this month with me and if not available with Dr. Kevin for ileoscopy through stoma    Thanks  SS

## 2024-11-11 ENCOUNTER — TELEPHONE (OUTPATIENT)
Dept: ENDOSCOPY | Facility: HOSPITAL | Age: 44
End: 2024-11-11
Payer: COMMERCIAL

## 2024-11-11 ENCOUNTER — PATIENT MESSAGE (OUTPATIENT)
Dept: UROLOGY | Facility: CLINIC | Age: 44
End: 2024-11-11
Payer: COMMERCIAL

## 2024-11-11 ENCOUNTER — LAB VISIT (OUTPATIENT)
Dept: LAB | Facility: HOSPITAL | Age: 44
End: 2024-11-11
Attending: INTERNAL MEDICINE
Payer: COMMERCIAL

## 2024-11-11 DIAGNOSIS — K50.919 CROHN'S DISEASE WITH COMPLICATION, UNSPECIFIED GASTROINTESTINAL TRACT LOCATION: Primary | ICD-10-CM

## 2024-11-11 DIAGNOSIS — N39.0 RECURRENT UTI: ICD-10-CM

## 2024-11-11 LAB
BACTERIA #/AREA URNS AUTO: ABNORMAL /HPF
BILIRUB UR QL STRIP: NEGATIVE
CLARITY UR REFRACT.AUTO: ABNORMAL
COLOR UR AUTO: YELLOW
GLUCOSE UR QL STRIP: NEGATIVE
HGB UR QL STRIP: ABNORMAL
HYALINE CASTS UR QL AUTO: 0 /LPF
KETONES UR QL STRIP: NEGATIVE
LEUKOCYTE ESTERASE UR QL STRIP: ABNORMAL
MICROSCOPIC COMMENT: ABNORMAL
NITRITE UR QL STRIP: NEGATIVE
PH UR STRIP: 6 [PH] (ref 5–8)
PROT UR QL STRIP: ABNORMAL
RBC #/AREA URNS AUTO: >100 /HPF (ref 0–4)
SP GR UR STRIP: 1.02 (ref 1–1.03)
SQUAMOUS #/AREA URNS AUTO: 2 /HPF
URN SPEC COLLECT METH UR: ABNORMAL
WBC #/AREA URNS AUTO: >100 /HPF (ref 0–5)
WBC CLUMPS UR QL AUTO: ABNORMAL

## 2024-11-11 PROCEDURE — 87086 URINE CULTURE/COLONY COUNT: CPT | Performed by: INTERNAL MEDICINE

## 2024-11-11 PROCEDURE — 81001 URINALYSIS AUTO W/SCOPE: CPT | Performed by: INTERNAL MEDICINE

## 2024-11-11 NOTE — TELEPHONE ENCOUNTER
"----- Message from MIKE Stovall sent at 11/8/2024 11:35 AM CST -----  Procedure: Ileoscopy thru stoma    Diagnosis: Crohn's disease    Procedure Timing: Within 4 weeks (Urgent)    #If within 4 weeks selected, please martina as high priority#    #If greater than 12 weeks, please select "5-12 weeks" and delay sending until 2 months prior to requested date#     Provider: RICHMOND Kevin    Location: Any Site    Additional Scheduling Information: No scheduling concerns    Prep Specifications:Standard prep    Is the patient taking a GLP-1 Agonist:no    Have you attached a patient to this message: yes  "

## 2024-11-12 LAB — BACTERIA UR CULT: NORMAL

## 2024-11-12 NOTE — TELEPHONE ENCOUNTER
Referral for procedure from [facility]      Spoke to pt to schedule procedure(s) Ileoscopy through Stoma       Physician to perform procedure(s) Dr. SACHI Garcia  Date of Procedure (s) 12/02/24  Arrival Time 9:30 AM  Time of Procedure(s) 10:30 AM   Location of Procedure(s) [place] 4th Floor  Type of Rx Prep sent to patient: Miralax/half  Instructions provided to patient via [facility]    Patient was informed on the following information and verbalized understanding. Screening questionnaire reviewed with patient and complete. If procedure requires anesthesia, a responsible adult needs to be present to accompany the patient home, patient cannot drive after receiving anesthesia. Appointment details are tentative, especially check-in time. Patient will receive a prep-op call 7 days prior to confirm check-in time for procedure. If applicable the patient should contact their pharmacy to verify Rx for procedure prep is ready for pick-up. Patient was advised to call the scheduling department at [phone] if pharmacy states no Rx is available. Patient was advised to call the endoscopy scheduling department if any questions or concerns arise.       Endoscopy Scheduling Department

## 2024-11-12 NOTE — TELEPHONE ENCOUNTER
"----- Message from Alida sent at 11/11/2024  8:52 AM CST -----    ----- Message -----  From: Sia Valdez RN  Sent: 11/8/2024  11:36 AM CST  To: Matt Cristina MA; #    Procedure: Ileoscopy thru stoma    Diagnosis: Crohn's disease    Procedure Timing: Within 4 weeks (Urgent)    #If within 4 weeks selected, please martina as high priority#    #If greater than 12 weeks, please select "5-12 weeks" and delay sending until 2 months prior to requested date#     Provider: RICHMOND Kevin    Location: Any Site    Additional Scheduling Information: No scheduling concerns    Prep Specifications:Standard prep    Is the patient taking a GLP-1 Agonist:no    Have you attached a patient to this message: yes  "

## 2024-11-12 NOTE — TELEPHONE ENCOUNTER
Contacted the patient to schedule an endoscopy procedure(s) Ileoscopy . The patient did not answer the call and left a voice message requesting a call back.

## 2024-11-15 ENCOUNTER — PATIENT MESSAGE (OUTPATIENT)
Dept: GASTROENTEROLOGY | Facility: CLINIC | Age: 44
End: 2024-11-15
Payer: COMMERCIAL

## 2024-11-18 ENCOUNTER — PATIENT MESSAGE (OUTPATIENT)
Dept: INFECTIOUS DISEASES | Facility: CLINIC | Age: 44
End: 2024-11-18
Payer: COMMERCIAL

## 2024-11-18 NOTE — PROGRESS NOTES
CHW - Unable to Contact    Community Health Worker to close episode at this time due to three missed attempts for  patientcontact.

## 2024-11-20 ENCOUNTER — OFFICE VISIT (OUTPATIENT)
Dept: INTERNAL MEDICINE | Facility: CLINIC | Age: 44
End: 2024-11-20
Payer: COMMERCIAL

## 2024-11-20 VITALS
SYSTOLIC BLOOD PRESSURE: 110 MMHG | HEART RATE: 67 BPM | WEIGHT: 112.63 LBS | OXYGEN SATURATION: 99 % | BODY MASS INDEX: 21.29 KG/M2 | DIASTOLIC BLOOD PRESSURE: 70 MMHG

## 2024-11-20 DIAGNOSIS — F41.9 ANXIETY: ICD-10-CM

## 2024-11-20 DIAGNOSIS — F33.1 MODERATE EPISODE OF RECURRENT MAJOR DEPRESSIVE DISORDER: ICD-10-CM

## 2024-11-20 DIAGNOSIS — F41.1 GENERALIZED ANXIETY DISORDER: Chronic | ICD-10-CM

## 2024-11-20 DIAGNOSIS — Z00.00 ROUTINE HEALTH MAINTENANCE: Primary | ICD-10-CM

## 2024-11-20 PROCEDURE — 1159F MED LIST DOCD IN RCRD: CPT | Mod: CPTII,S$GLB,, | Performed by: FAMILY MEDICINE

## 2024-11-20 PROCEDURE — 99999 PR PBB SHADOW E&M-EST. PATIENT-LVL III: CPT | Mod: PBBFAC,,, | Performed by: FAMILY MEDICINE

## 2024-11-20 PROCEDURE — 3044F HG A1C LEVEL LT 7.0%: CPT | Mod: CPTII,S$GLB,, | Performed by: FAMILY MEDICINE

## 2024-11-20 PROCEDURE — 1111F DSCHRG MED/CURRENT MED MERGE: CPT | Mod: CPTII,S$GLB,, | Performed by: FAMILY MEDICINE

## 2024-11-20 PROCEDURE — 99396 PREV VISIT EST AGE 40-64: CPT | Mod: S$GLB,,, | Performed by: FAMILY MEDICINE

## 2024-11-20 PROCEDURE — 3078F DIAST BP <80 MM HG: CPT | Mod: CPTII,S$GLB,, | Performed by: FAMILY MEDICINE

## 2024-11-20 PROCEDURE — 3008F BODY MASS INDEX DOCD: CPT | Mod: CPTII,S$GLB,, | Performed by: FAMILY MEDICINE

## 2024-11-20 PROCEDURE — 3074F SYST BP LT 130 MM HG: CPT | Mod: CPTII,S$GLB,, | Performed by: FAMILY MEDICINE

## 2024-11-20 RX ORDER — ZOLPIDEM TARTRATE 10 MG/1
10 TABLET ORAL NIGHTLY
Qty: 30 TABLET | Refills: 2 | Status: SHIPPED | OUTPATIENT
Start: 2024-11-20

## 2024-11-21 NOTE — PROGRESS NOTES
Subjective:       Patient ID: Ivy Salgado is a 44 y.o. female.    Chief Complaint: Annual Exam (Hospital Follow in Oct )    HPI  History of Present Illness    CHIEF COMPLAINT:  Ivy presents today for an annual exam and hospital follow-up.    RECENT HOSPITAL ADMISSION:  She was discharged on October 24th following a 5-day admission for small bowel obstruction with possible segmental enteritis. She presented with two days of progressive abdominal pain, nausea, vomiting, and decreased stoma output. She refused NG tube and steroid treatment. A gastrographin challenge was performed on October 21st, W Contrast passing through the ostomy bag on October 22nd. Her diet was subsequently advanced and tolerated. She experienced improvement in nausea and pain prior to discharge.    MEDICAL HISTORY:  She has a history of ileostomy and multiple small bowel obstructions secondary to Crohn's disease.    FOLLOW-UP CARE:  She has scheduled follow-up visits with colorectal surgeon Dr. Sher and with GI in early December.      ROS:  General: -fever, -chills, -fatigue, -weight gain, -weight loss  Eyes: -vision changes, -redness, -discharge  ENT: -ear pain, -nasal congestion, -sore throat  Cardiovascular: -chest pain, -palpitations, -lower extremity edema  Respiratory: -cough, -shortness of breath  Gastrointestinal: +abdominal pain, +nausea, +vomiting, -diarrhea, -constipation, -blood in stool  Genitourinary: -dysuria, -hematuria, -frequency  Musculoskeletal: -joint pain, -muscle pain  Skin: -rash, -lesion  Neurological: -headache, -dizziness, -numbness, -tingling  Psychiatric: -anxiety, -depression, -sleep difficulty           All of your core healthy metrics are met.      Social History     Social History Narrative    Not on file       Family History   Problem Relation Name Age of Onset    Diabetes Paternal Grandfather Stephen     Hearing loss Paternal Grandmother Viviane     Cancer Maternal Grandfather Philippe Jernigan     Skin cancer Maternal Grandfather Philippe     Diabetes Maternal Grandfather Philippe     Heart disease Maternal Grandfather Philippe     Colon cancer Father Milan     Cancer Father Milan         Colon    Liver cancer Father Milan     Hyperlipidemia Father Milan     Hypertension Mother Shaila     Crohn's disease Brother      Endometrial cancer Maternal Aunt      Breast cancer Maternal Cousin  41    Crohn's disease Daughter      Ovarian cancer Neg Hx      Melanoma Neg Hx         Current Outpatient Medications:     clonazePAM (KLONOPIN) 1 MG tablet, Take 1 tablet (1 mg total) by mouth 2 (two) times daily., Disp: 60 tablet, Rfl: 2    cyclobenzaprine (FLEXERIL) 10 MG tablet, Take 1 tablet (10 mg total) by mouth every evening as needed for muscle pain, Disp: 30 tablet, Rfl: 2    dicyclomine (BENTYL) 20 mg tablet, Take 1 tablet (20 mg total) by mouth every 6 (six) hours as needed (cramping)., Disp: 60 tablet, Rfl: 4    droNABinol (MARINOL) 10 MG capsule, Take 1 capsule (10 mg total) by mouth once daily., Disp: 90 capsule, Rfl: 2    estradiol 0.025 mg/24 hr 0.025 mg/24 hr, Place 1 patch onto the skin once a week. (Patient taking differently: Place 1 patch onto the skin once a week. CHANGES ON MONDAYS), Disp: 4 patch, Rfl: 11    gabapentin (NEURONTIN) 300 MG capsule, Take 1 capsule (300 mg total) by mouth 2 (two) times daily., Disp: 60 capsule, Rfl: 4    loperamide (IMODIUM) 1 mg/7.5 mL solution, Take 4 mg by mouth 3 (three) times daily as needed for Diarrhea., Disp: , Rfl:     mirtazapine (REMERON SOL-TAB) 30 MG disintegrating tablet, Take 1 tablet (30 mg total) by mouth nightly., Disp: 90 tablet, Rfl: 2    multivitamin (THERAGRAN) per tablet, Take 1 tablet by mouth once daily., Disp: , Rfl:     nadoloL (CORGARD) 40 MG tablet, Take 1 tablet (40 mg total) by mouth once daily. Patient needs appointment before next refill., Disp: 90 tablet, Rfl: 7    pantoprazole (PROTONIX) 40 MG tablet, Take 1 tablet (40 mg total) by mouth 2  (two) times daily., Disp: 60 tablet, Rfl: 12    promethazine (PHENERGAN) 25 MG tablet, Take 1 tablet (25 mg total) by mouth every 6 (six) hours as needed for Nausea., Disp: 30 tablet, Rfl: 0    tamsulosin (FLOMAX) 0.4 mg Cap, Take 1 capsule (0.4 mg total) by mouth once daily., Disp: 30 capsule, Rfl: 1    valACYclovir (VALTREX) 500 MG tablet, Take 1 tablet (500 mg total) by mouth once daily., Disp: 90 tablet, Rfl: 1    vedolizumab (ENTYVIO) 300 mg SolR injection, Inject 300 mg into the vein every 8 weeks., Disp: , Rfl:     zolpidem (AMBIEN) 10 mg Tab, Take 1 tablet (10 mg total) by mouth every evening., Disp: 30 tablet, Rfl: 2    Review of Systems    Objective:   /70 (BP Location: Left arm, Patient Position: Sitting)   Pulse 67   Wt 51.1 kg (112 lb 10.5 oz)   LMP 03/30/2015   SpO2 99%   BMI 21.29 kg/m²      Physical Exam  Vitals reviewed.   Constitutional:       Appearance: She is well-developed.   HENT:      Head: Normocephalic and atraumatic.   Eyes:      Conjunctiva/sclera: Conjunctivae normal.   Cardiovascular:      Rate and Rhythm: Normal rate.   Pulmonary:      Effort: Pulmonary effort is normal. No respiratory distress.   Skin:     General: Skin is warm and dry.      Findings: No rash.   Neurological:      Mental Status: She is alert and oriented to person, place, and time.      Coordination: Coordination normal.   Psychiatric:         Behavior: Behavior normal.         Physical Exam             @resultssec@  Assessment & Plan   Assessment & Plan    Recent hospital admission (10/19-10/24) for small bowel obstruction and possible segmental enteritis, likely related to Crohn's disease and ileostomy  Patient refused NG tube and steroids during admission  Gastrographin challenge on 10/21 W Contrast passage on 10/22  Improved symptoms and diet tolerance prior to discharge    INTESTINAL ADHESIONS AND OBSTRUCTION:  - Follow up with colorectal surgeon Dr. Sher in early December.    CROHN'S DISEASE AND  DIGESTIVE SYSTEM DISORDERS:  - Follow up with GI in early December.         Problem List Items Addressed This Visit          Psychiatric    Generalized anxiety disorder (Chronic)    Moderate episode of recurrent major depressive disorder    Current Assessment & Plan     Complicated by medical conditions.  Currently stable          Other Visit Diagnoses       Routine health maintenance    -  Primary    Anxiety        Relevant Medications    zolpidem (AMBIEN) 10 mg Tab              Immunizations Administered on Date of Encounter - 11/20/2024       No immunizations on file.             No follow-ups on file.    This note was generated with the assistance of ambient listening technology. Verbal consent was obtained by the patient and accompanying visitor(s) for the recording of patient appointment to facilitate this note. I attest to having reviewed and edited the generated note for accuracy, though some syntax or spelling errors may persist. Please contact the author of this note for any clarification.      Disclaimer:  This note may have been prepared using voice recognition software, it may have not been extensively proofed, as such there could be errors within the text such as sound alike errors.

## 2024-11-22 ENCOUNTER — PATIENT MESSAGE (OUTPATIENT)
Dept: ELECTROPHYSIOLOGY | Facility: CLINIC | Age: 44
End: 2024-11-22
Payer: COMMERCIAL

## 2024-11-22 NOTE — TELEPHONE ENCOUNTER
Called patient in response to a message received. Patient did not answer. I left a message with my call back number.

## 2024-11-25 ENCOUNTER — TELEPHONE (OUTPATIENT)
Dept: ELECTROPHYSIOLOGY | Facility: CLINIC | Age: 44
End: 2024-11-25
Payer: COMMERCIAL

## 2024-11-25 DIAGNOSIS — I45.81 LONG QT SYNDROME: Primary | ICD-10-CM

## 2024-11-25 DIAGNOSIS — R00.2 PALPITATIONS: ICD-10-CM

## 2024-11-25 NOTE — TELEPHONE ENCOUNTER
Called patient to discuss palpitations and dizziness. Patient describes the symptoms as intermittent and not longstanding. In discussion offered for patient to get an EKG performed today 11/25/2024. Patient refused and wants to be scheduled for EKG tomorrow at 7:30 AM [facility] [place] main campus. Scheduled patient for EKG 11/26/2024 at 7:30 AM patient verbalized understanding and confirmed date. Also instructed patient if palpitations or dizziness worsens patient needs to be evaluated in the Emergency Room. Patient appreciated the call.

## 2024-11-26 ENCOUNTER — HOSPITAL ENCOUNTER (OUTPATIENT)
Dept: CARDIOLOGY | Facility: CLINIC | Age: 44
Discharge: HOME | End: 2024-11-26
Payer: COMMERCIAL

## 2024-11-26 DIAGNOSIS — R00.2 PALPITATIONS: ICD-10-CM

## 2024-11-26 LAB
OHS QRS DURATION: 74 MS
OHS QTC CALCULATION: 458 MS

## 2024-11-26 PROCEDURE — 93010 ELECTROCARDIOGRAM REPORT: CPT | Mod: S$GLB,,, | Performed by: INTERNAL MEDICINE

## 2024-11-26 PROCEDURE — 93005 ELECTROCARDIOGRAM TRACING: CPT | Mod: S$GLB,,, | Performed by: INTERNAL MEDICINE

## 2024-11-29 ENCOUNTER — PATIENT MESSAGE (OUTPATIENT)
Dept: GASTROENTEROLOGY | Facility: CLINIC | Age: 44
End: 2024-11-29
Payer: COMMERCIAL

## 2024-11-29 DIAGNOSIS — M25.512 CHRONIC LEFT SHOULDER PAIN: ICD-10-CM

## 2024-11-29 DIAGNOSIS — G89.29 CHRONIC LEFT SHOULDER PAIN: ICD-10-CM

## 2024-11-29 DIAGNOSIS — Z98.890 S/P SHOULDER SURGERY: ICD-10-CM

## 2024-12-01 RX ORDER — CYCLOBENZAPRINE HCL 10 MG
10 TABLET ORAL NIGHTLY
Qty: 30 TABLET | Refills: 2 | Status: SHIPPED | OUTPATIENT
Start: 2024-12-01

## 2024-12-02 ENCOUNTER — HOSPITAL ENCOUNTER (OUTPATIENT)
Facility: HOSPITAL | Age: 44
Discharge: HOME | End: 2024-12-02
Attending: INTERNAL MEDICINE | Admitting: INTERNAL MEDICINE
Payer: COMMERCIAL

## 2024-12-02 ENCOUNTER — ANESTHESIA EVENT (OUTPATIENT)
Dept: ENDOSCOPY | Facility: HOSPITAL | Age: 44
End: 2024-12-02
Payer: COMMERCIAL

## 2024-12-02 ENCOUNTER — ANESTHESIA (OUTPATIENT)
Dept: ENDOSCOPY | Facility: HOSPITAL | Age: 44
End: 2024-12-02
Payer: COMMERCIAL

## 2024-12-02 VITALS
OXYGEN SATURATION: 97 % | TEMPERATURE: 98 F | HEIGHT: 61 IN | HEART RATE: 64 BPM | SYSTOLIC BLOOD PRESSURE: 102 MMHG | BODY MASS INDEX: 21.14 KG/M2 | RESPIRATION RATE: 16 BRPM | WEIGHT: 112 LBS | DIASTOLIC BLOOD PRESSURE: 71 MMHG

## 2024-12-02 DIAGNOSIS — K50.018 CROHN'S DISEASE OF SMALL INTESTINE WITH OTHER COMPLICATION: Primary | ICD-10-CM

## 2024-12-02 DIAGNOSIS — K50.90 CROHN DISEASE: ICD-10-CM

## 2024-12-02 PROCEDURE — 37000008 HC ANESTHESIA 1ST 15 MINUTES: Performed by: INTERNAL MEDICINE

## 2024-12-02 PROCEDURE — 88305 TISSUE EXAM BY PATHOLOGIST: CPT | Performed by: PATHOLOGY

## 2024-12-02 PROCEDURE — 27201012 HC FORCEPS, HOT/COLD, DISP: Performed by: INTERNAL MEDICINE

## 2024-12-02 PROCEDURE — 63600175 PHARM REV CODE 636 W HCPCS: Performed by: NURSE ANESTHETIST, CERTIFIED REGISTERED

## 2024-12-02 PROCEDURE — 37000009 HC ANESTHESIA EA ADD 15 MINS: Performed by: INTERNAL MEDICINE

## 2024-12-02 PROCEDURE — E9220 PRA ENDO ANESTHESIA: HCPCS | Mod: ,,, | Performed by: NURSE ANESTHETIST, CERTIFIED REGISTERED

## 2024-12-02 PROCEDURE — 44382 SMALL BOWEL ENDOSCOPY: CPT | Mod: ,,, | Performed by: INTERNAL MEDICINE

## 2024-12-02 PROCEDURE — 44382 SMALL BOWEL ENDOSCOPY: CPT | Performed by: INTERNAL MEDICINE

## 2024-12-02 RX ORDER — SODIUM CHLORIDE 0.9 % (FLUSH) 0.9 %
10 SYRINGE (ML) INJECTION
OUTPATIENT
Start: 2024-12-02

## 2024-12-02 RX ORDER — HALOPERIDOL 5 MG/ML
0.5 INJECTION INTRAMUSCULAR EVERY 10 MIN PRN
Status: DISCONTINUED | OUTPATIENT
Start: 2024-12-02 | End: 2024-12-02

## 2024-12-02 RX ORDER — GLUCAGON 1 MG
1 KIT INJECTION
OUTPATIENT
Start: 2024-12-02

## 2024-12-02 RX ORDER — PROPOFOL 10 MG/ML
VIAL (ML) INTRAVENOUS
Status: DISCONTINUED | OUTPATIENT
Start: 2024-12-02 | End: 2024-12-02

## 2024-12-02 RX ORDER — SODIUM CHLORIDE 9 MG/ML
INJECTION, SOLUTION INTRAVENOUS CONTINUOUS
Status: DISCONTINUED | OUTPATIENT
Start: 2024-12-02 | End: 2024-12-02 | Stop reason: HOSPADM

## 2024-12-02 RX ADMIN — PROPOFOL 200 MCG/KG/MIN: 10 INJECTION, EMULSION INTRAVENOUS at 10:12

## 2024-12-02 RX ADMIN — PROPOFOL 80 MG: 10 INJECTION, EMULSION INTRAVENOUS at 10:12

## 2024-12-02 NOTE — [employer]ATION PATIENT INSTRUCTIONS
Discharge Summary/Instructions after an Endoscopic Procedure  Patient Name: Ivy Salgado  Patient MRN: [id number]  Patient YOB: 1980 Monday, December 2, 2024  Peace Garcia MD  Dear patient,  As a result of recent federal legislation (The Federal Cures Act), you may   receive lab or pathology results from your procedure in your [facility]   account before your physician is able to contact you. Your physician or   their representative will relay the results to you with their   recommendations at their soonest availability.  Thank you,  RESTRICTIONS:  During your procedure today, you received medications for sedation.  These   medications may affect your judgment, balance and coordination.  Therefore,   for 24 hours, you have the following restrictions:   - DO NOT drive a car, operate machinery, make legal/financial decisions,   sign important papers or drink alcohol.    ACTIVITY:  Today: no heavy lifting, straining or running due to procedural   sedation/anesthesia.  The following day: return to full activity including work.  DIET:  Eat and drink normally unless instructed otherwise.     TREATMENT FOR COMMON SIDE EFFECTS:  - Mild abdominal pain, nausea, belching, bloating or excessive gas:  rest,   eat lightly and use a heating pad.  - Sore Throat: treat with throat lozenges and/or gargle with warm salt   water.  - Because air was used during the procedure, expelling large amounts of air   from your rectum or belching is normal.  - If a bowel prep was taken, you may not have a bowel movement for 1-3 days.    This is normal.  SYMPTOMS TO WATCH FOR AND REPORT TO YOUR PHYSICIAN:  1. Abdominal pain or bloating, other than gas cramps.  2. Chest pain.  3. Back pain.  4. Signs of infection such as: chills or fever occurring within 24 hours   after the procedure.  5. Rectal bleeding, which would show as bright red, maroon, or black stools.   (A tablespoon of blood from the rectum is not serious, especially if    hemorrhoids are present.)  6. Vomiting.  7. Weakness or dizziness.  GO DIRECTLY TO THE NEAREST EMERGENCY ROOM IF YOU HAVE ANY OF THE FOLLOWING:      Difficulty breathing              Chills and/or fever over 101 F   Persistent vomiting and/or vomiting blood   Severe abdominal pain   Severe chest pain   Black, tarry stools   Bleeding- more than one tablespoon   Any other symptom or condition that you feel may need urgent attention  Your doctor recommends these additional instructions:  If any biopsies were taken, your doctors clinic will contact you in 1 to 2   weeks with any results.  - Discharge patient to home.   - Patient has a contact number available for emergencies.  The signs and   symptoms of potential delayed complications were discussed with the   patient.  Return to normal activities tomorrow.  Written discharge   instructions were provided to the patient.   - Resume previous diet.   - Await pathology results.   - Repeat ileoscopy in 1 year to assess disease activity.   For questions, problems or results please call your physician - Peace Garcia MD at Work:  [phone].  [facility] [place], EMERGENCY ROOM PHONE NUMBER: [phone]  IF A COMPLICATION OR EMERGENCY SITUATION ARISES AND YOU ARE UNABLE TO REACH   YOUR PHYSICIAN - GO DIRECTLY TO THE EMERGENCY ROOM.  Peace Garcia MD  12/2/2024 10:51:32 AM  This report has been verified and signed electronically.  Dear patient,  As a result of recent federal legislation (The Federal Cures Act), you may   receive lab or pathology results from your procedure in your [facility]   account before your physician is able to contact you. Your physician or   their representative will relay the results to you with their   recommendations at their soonest availability.  Thank you,  PROVATION

## 2024-12-02 NOTE — ANESTHESIA PREPROCEDURE EVALUATION
[facility]  Anesthesia Pre-Operative Evaluation         Patient Name: Ivy Salgado  YOB: 1980  MRN: [id number]    SUBJECTIVE:   12/02/2024    Ivy Salgado is a 44 y.o. female w/ a significant PMHx of ARPITA/Depression, GERD, Long QTc syndrome (Type III, on nadolol), s/p SHELLIE (2015) for recurrent ovarian cysts and endometriosis, Crohn's disease with small and large intestine involvement, s/p total proctocolectomy with end ileostomy (6/2012).    Port in place for infusions for Crohn's.     Patient now presents for the above procedure(s). ileoscopy    Prev airway:   Date/Time: 7/18/2023 7:14 AM  Performed by: Jeannette Orozco CRNA  Authorized by: Duncan Clark MD      Intubation:     Induction:  Intravenous    Intubated:  Postinduction    Mask Ventilation:  Easy mask    Attempts:  1    Attempted By:  CRNA (Jaylyn)    Method of Intubation:  Video laryngoscopy    Blade:  Dunbar 3    Laryngeal View Grade: Grade I - full view of cords      Difficult Airway Encountered?: No      Complications:  None    Airway Device:  Oral endotracheal tube    Airway Device Size:  7.0    Style/Cuff Inflation:  Cuffed    Inflation Amount (mL):  4    Tube secured:  20    Secured at:  The lips    Placement Verified By:  Capnometry    Complicating Factors:  None    Findings Post-Intubation:  BS equal bilateral and atraumatic/condition of teeth unchanged    Echo:  3/12/2020  Normal left ventricular systolic function. The estimated ejection fraction is 63%.  No wall motion abnormalities.  Normal LV diastolic function.  Normal right ventricular systolic function.  Normal central venous pressure (3 mmHg).       Patient Active Problem List   Diagnosis    Ileostomy in place    Crohn's disease of ileum with end ileostomy    Generalized anxiety disorder    Genital HSV    Small bowel obstruction    Joint pain    Hypertension    High output ileostomy    Vitamin D deficiency    Multiple thyroid nodules    Osteopenia of  multiple sites    GERD (gastroesophageal reflux disease)    Alkaline phosphatase elevation- based on lab from 4/9/2019     History of recurrent UTI (urinary tract infection)    Pelvic pain in female    Ureteral stone    History of prolonged Q-T interval on ECG    Biceps tendonitis on left    Decreased range of motion of left shoulder    Moderate episode of recurrent major depressive disorder    Recurrent Clostridioides difficile diarrhea    Kidney stone    Melanoma in situ (excised-buttocks 2018, para-stomal site 2019)    Long QT syndrome    Pancreas cyst (tail, 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       Review of patient's allergies indicates:   Allergen Reactions    Azathioprine sodium Other (See Comments)     Other reaction(s): pancreatitis  Other reaction(s): pancreatitis    Methotrexate analogues Other (See Comments)     leukopenia    Stelara [ustekinumab] Other (See Comments)     Multiple infections    Zofran [ondansetron hcl (pf)] Other (See Comments)     Per patient causes prolong QT    Vancomycin analogues Other (See Comments)     Made her red    Azathioprine      Other reaction(s): Unknown    Methotrexate      Other reaction(s): infection-    Morphine Itching and Other (See Comments)     Other reaction(s): Itching    Zofran [ondansetron hcl]      Other reaction(s): Hives    Bactrim [sulfamethoxazole-trimethoprim] Palpitations    Ciprofloxacin Palpitations       Current Outpatient Medications:  No current facility-administered medications for this encounter.    Current Outpatient Medications:     clonazePAM (KLONOPIN) 1 MG tablet, Take 1 tablet (1 mg total) by mouth 2 (two) times daily., Disp: 60 tablet, Rfl: 2    cyclobenzaprine (FLEXERIL) 10 MG tablet, Take 1 tablet (10 mg total) by mouth every evening as needed for muscle pain, Disp: 30 tablet, Rfl: 2    dicyclomine (BENTYL) 20 mg tablet, Take 1  tablet (20 mg total) by mouth every 6 (six) hours as needed (cramping)., Disp: 60 tablet, Rfl: 4    droNABinol (MARINOL) 10 MG capsule, Take 1 capsule (10 mg total) by mouth once daily., Disp: 90 capsule, Rfl: 2    estradiol 0.025 mg/24 hr 0.025 mg/24 hr, Place 1 patch onto the skin once a week. (Patient taking differently: Place 1 patch onto the skin once a week. CHANGES ON MONDAYS), Disp: 4 patch, Rfl: 11    gabapentin (NEURONTIN) 300 MG capsule, Take 1 capsule (300 mg total) by mouth 2 (two) times daily., Disp: 60 capsule, Rfl: 4    loperamide (IMODIUM) 1 mg/7.5 mL solution, Take 4 mg by mouth 3 (three) times daily as needed for Diarrhea., Disp: , Rfl:     mirtazapine (REMERON SOL-TAB) 30 MG disintegrating tablet, Take 1 tablet (30 mg total) by mouth nightly., Disp: 90 tablet, Rfl: 2    multivitamin (THERAGRAN) per tablet, Take 1 tablet by mouth once daily., Disp: , Rfl:     nadoloL (CORGARD) 40 MG tablet, Take 1 tablet (40 mg total) by mouth once daily. Patient needs appointment before next refill., Disp: 90 tablet, Rfl: 7    pantoprazole (PROTONIX) 40 MG tablet, Take 1 tablet (40 mg total) by mouth 2 (two) times daily., Disp: 60 tablet, Rfl: 12    promethazine (PHENERGAN) 25 MG tablet, Take 1 tablet (25 mg total) by mouth every 6 (six) hours as needed for Nausea., Disp: 30 tablet, Rfl: 0    tamsulosin (FLOMAX) 0.4 mg Cap, Take 1 capsule (0.4 mg total) by mouth once daily., Disp: 30 capsule, Rfl: 1    valACYclovir (VALTREX) 500 MG tablet, Take 1 tablet (500 mg total) by mouth once daily., Disp: 90 tablet, Rfl: 1    vedolizumab (ENTYVIO) 300 mg SolR injection, Inject 300 mg into the vein every 8 weeks., Disp: , Rfl:     zolpidem (AMBIEN) 10 mg Tab, Take 1 tablet (10 mg total) by mouth every evening., Disp: 30 tablet, Rfl: 2    Past Surgical History:   Procedure Laterality Date    ABDOMINAL SURGERY      APPENDECTOMY      ARTHROPLASTY OF SHOULDER Left 7/18/2023    Procedure: ARTHROPLASTY, SHOULDER;  Surgeon: Sharon  MD Holley;  Location: [facility] OR;  Service: Orthopedics;  Laterality: Left;    AUGMENTATION OF BREAST Bilateral 06/2022    gel implants    BILATERAL SALPINGO-OOPHORECTOMY (BSO) Bilateral 05/30/2019    Procedure: SALPINGO-OOPHORECTOMY, BILATERAL;  Surgeon: Rupa German MD;  Location: [facility] OR 2ND FLR;  Service: OB/GYN;  Laterality: Bilateral;    BLADDER SURGERY      partial cystectomy due to fistula    breast lift      BREAST SURGERY      [facility]      COLON SURGERY      COLONOSCOPY      CYSTOSCOPY  09/23/2020    Procedure: CYSTOSCOPY;  Surgeon: Sascha Florentino MD;  Location: [facility] OR 1ST FLR;  Service: Urology;;    CYSTOSCOPY W/ URETERAL STENT PLACEMENT Left 09/15/2020    Procedure: CYSTOSCOPY, WITH URETERAL STENT INSERTION;  Surgeon: Sascha Florentino MD;  Location: [facility] OR 1ST FLR;  Service: Urology;  Laterality: Left;    DIAGNOSTIC LAPAROSCOPY N/A 07/09/2020    Procedure: LAPAROSCOPY, DIAGNOSTIC;  Surgeon: Gilson lE MD;  Location: [facility] OR;  Service: OB/GYN;  Laterality: N/A;    ESOPHAGOGASTRODUODENOSCOPY N/A 1/3/2024    Procedure: EGD (ESOPHAGOGASTRODUODENOSCOPY);  Surgeon: Lily Lind MD;  Location: [facility] (2ND FLR);  Service: Endoscopy;  Laterality: N/A;    EXCISION OF MELANOMA  07/17/2019    ILEOSCOPY N/A 1/3/2024    Procedure: ILEOSCOPY;  Surgeon: Lily Lind MD;  Location: [facility] (2ND FLR);  Service: Endoscopy;  Laterality: N/A;    ILEOSCOPY N/A 1/24/2024    Procedure: ILEOSCOPY;  Surgeon: Lilian Navarro MD;  Location: [facility] ENDO (2ND FLR);  Service: Endoscopy;  Laterality: N/A;  through stoma    ILEOSCOPY N/A 6/4/2024    Procedure: ILEOSCOPY;  Surgeon: Peace Garcia MD;  Location: [facility] ENDO (4TH FLR);  Service: Endoscopy;  Laterality: N/A;  Ref By:kaleb Stallworth sent via portal,AC  received urgent message to reschedule pt from 7/15  to may or june-updated prep instructions sent-   5/29/24- precall complete - ERW    ILEOSTOMY      INSERTION OF TUNNELED CENTRAL VENOUS  CATHETER (CVC) WITH SUBCUTANEOUS PORT Right 9/10/2024    Procedure: INSERTION, SINGLE LUMEN CATHETER WITH PORT, WITH FLUOROSCOPIC GUIDANCE, Rt neck or chest, prefers chest;  Surgeon: Vinh Lloyd MD;  Location: [facility] OR 2ND FLR;  Service: General;  Laterality: Right;    LAPAROSCOPIC LYSIS OF ADHESIONS N/A 07/09/2020    Procedure: LYSIS, ADHESIONS, LAPAROSCOPIC;  Surgeon: Gilson El MD;  Location: [facility] OR;  Service: OB/GYN;  Laterality: N/A;    LASER LITHOTRIPSY  09/23/2020    Procedure: LITHOTRIPSY, USING LASER;  Surgeon: Sascha Florentino MD;  Location: [facility] OR 1ST FLR;  Service: Urology;;    LYSIS OF ADHESIONS N/A 05/30/2019    Procedure: LYSIS, ADHESIONS;  Surgeon: Rupa German MD;  Location: [facility] OR 2ND FLR;  Service: OB/GYN;  Laterality: N/A;    MEDIPORT REMOVAL Left 9/10/2024    Procedure: REMOVAL, CATHETER, CENTRAL VENOUS, TUNNELED, WITH PORT, Left side;  Surgeon: Vinh Lloyd MD;  Location: [facility] OR 2ND FLR;  Service: General;  Laterality: Left;    OOPHORECTOMY Right 04/16/2015    PORTACATH PLACEMENT  02/21/2017    SKIN BIOPSY      SMALL INTESTINE SURGERY      age 18 Y    TOTAL ABDOMINAL HYSTERECTOMY  04/16/2015    TOTAL COLECTOMY      TUBAL LIGATION  06/06/2012    UPPER GASTROINTESTINAL ENDOSCOPY      URETEROSCOPIC REMOVAL OF URETERIC CALCULUS  09/23/2020    Procedure: REMOVAL, CALCULUS, URETER, URETEROSCOPIC;  Surgeon: Sascha Florentino MD;  [facility];  Service: Urology;;    URETEROSCOPY Left 09/23/2020    Procedure: URETEROSCOPY;  Surgeon: Sascha Florentino MD;  [facility];  Service: Urology;  Laterality: Left;       Social History     Socioeconomic History    Marital status: Single   Occupational History     Employer: [employer]   Tobacco Use    Smoking status: Never     Passive exposure: Past    Smokeless tobacco: Never   Substance and Sexual Activity    Alcohol use: Not Currently     Alcohol/week: 0.0 standard drinks of alcohol    Drug  use: No    Sexual activity: Yes     Partners: Male     Birth control/protection: See Surgical Hx     Comment: HYST   Other Topics Concern    Are you pregnant or think you may be? No    Breast-feeding No     Social Drivers of Health     Financial Resource Strain: High Risk (11/19/2024)    Overall Financial Resource Strain (CARDIA)     Difficulty of Paying Living Expenses: Hard   Food Insecurity: Food Insecurity Present (11/19/2024)    Hunger Vital Sign     Worried About Running Out of Food in the Last Year: Sometimes true     Ran Out of Food in the Last Year: Sometimes true   Transportation Needs: No Transportation Needs (11/6/2024)    PRAPARE - Transportation     Lack of Transportation (Medical): No     Lack of Transportation (Non-Medical): No   Physical Activity: Insufficiently Active (11/19/2024)    Exercise Vital Sign     Days of Exercise per Week: 3 days     Minutes of Exercise per Session: 40 min   Stress: Stress Concern Present (11/19/2024)    [place] [employer] - Occupational Stress Questionnaire     Feeling of Stress : Rather much   Housing Stability: High Risk (11/19/2024)    Housing Stability Vital Sign     Unable to Pay for Housing in the Last Year: Yes     Homeless in the Last Year: No       OBJECTIVE:     Vital Signs Range (Last 24H):         Significant Labs:  Lab Results   Component Value Date    WBC 5.73 10/23/2024    HGB 12.3 10/23/2024    HCT 38.0 10/23/2024     10/23/2024    CHOL 192 01/02/2024    TRIG 150 01/02/2024    HDL 40 01/02/2024    ALT 32 10/18/2024    AST 48 (H) 10/18/2024     10/23/2024     10/23/2024    K 3.6 10/23/2024    K 3.6 10/23/2024     10/23/2024     10/23/2024    CREATININE 0.7 10/23/2024    CREATININE 0.7 10/23/2024    BUN 6 10/23/2024    BUN 6 10/23/2024    CO2 25 10/23/2024    CO2 25 10/23/2024    TSH 2.386 01/02/2024    INR 1.0 07/16/2024    HGBA1C 5.1 01/01/2024       Diagnostic Studies: No relevant studies.    EKG:    Results for orders placed or performed during the hospital encounter of 11/26/24   SCHEDULED EKG 12-LEAD (to Muse)    Collection Time: 11/26/24  7:42 AM   Result Value Ref Range    QRS Duration 74 ms    [facility] QTC Calculation 458 ms    Narrative    Test Reason : R00.2,    Vent. Rate :  73 BPM     Atrial Rate :  73 BPM     P-R Int : 126 ms          QRS Dur :  74 ms      QT Int : 416 ms       P-R-T Axes :  61  44  50 degrees    QTcB Int : 458 ms    Normal sinus rhythm  Normal ECG  When compared with ECG of 09-Oct-2024 15:13,  Nonspecific T wave abnormality no longer evident in Lateral leads  Confirmed by Mathieu Haywood ([id number]) on 11/26/2024 10:01:35 AM    Referred By: SUN WADE           Confirmed By: Mathieu Haywood     ASSESSMENT/PLAN:       Pre-op Assessment    I have reviewed the Patient Summary Reports.     I have reviewed the Nursing Notes. I have reviewed the NPO Status.   I have reviewed the Medications.     Review of Systems  Anesthesia Hx:  No problems with previous Anesthesia             Denies Family Hx of Anesthesia complications.    Denies Personal Hx of Anesthesia complications.                    Hematology/Oncology:  Hematology Normal   Oncology Normal                                   EENT/Dental:  EENT/Dental Normal           Cardiovascular:     Hypertension                disease) Long QT syndrome                     Hypertension         Pulmonary:  Pulmonary Normal                       Renal/:  Chronic Renal Disease renal calculi       Kidney Function/Disease             Hepatic/GI:     GERD   Crohn's      Gerd          Musculoskeletal:  Musculoskeletal Normal                Neurological:  Neurology Normal                                      Endocrine:  Endocrine Normal            Psych:  Psychiatric History anxiety depression                Physical Exam  General: Well nourished, Cooperative, Alert and Oriented    Airway:  Mallampati: II   Mouth Opening: Normal  TM Distance: Normal  Tongue:  Normal  Neck ROM: Normal ROM    Dental:  Intact        Anesthesia Plan  Type of Anesthesia, risks & benefits discussed:    Anesthesia Type: Gen Natural Airway  Intra-op Monitoring Plan: Standard ASA Monitors  Induction:  IV  Informed Consent: Informed consent signed with the Patient and all parties understand the risks and agree with anesthesia plan.  All questions answered.   ASA Score: 2  Day of Surgery Review of History & Physical: H&P Update referred to the surgeon/provider.    Ready For Surgery From Anesthesia Perspective.     .

## 2024-12-02 NOTE — TRANSFER OF CARE
Anesthesia Transfer of Care Note    Patient: Ivy Salgado    Procedure(s) Performed: Procedure(s) (LRB):  ILEOSCOPY (N/A)    Patient location: PACU    Anesthesia Type: general    Transport from OR: Transported from OR on room air with adequate spontaneous ventilation    Post pain: adequate analgesia    Post assessment: no apparent anesthetic complications and tolerated procedure well    Post vital signs: stable    Level of consciousness: awake    Nausea/Vomiting: no nausea/vomiting    Complications: none    Transfer of care protocol was followed      Last vitals:

## 2024-12-02 NOTE — H&P
Short Stay Endoscopy History and Physical    PCP - Luis Madden DO  Referring Physician - Peace aGrcia MD  [address] [place] [address]    Procedure - ileoscopy through stoma  ASA - per anesthesia  Mallampati - per anesthesia  History of Anesthesia problems - no  Family history Anesthesia problems -  no   Plan of anesthesia - General    HPI  44 y.o. female  Reason for procedure:  Crohn's f/u  ROS:  Constitutional: No fevers, chills, No weight loss  CV: No chest pain  Pulm: No cough, No shortness of breath  GI: see HPI    Medical History:  has a past medical history of Abnormal Pap smear (2007), Alkaline phosphatase elevation- based on lab from 4/9/2019  (05/28/2019), Arthritis, Avascular necrosis of bone of hip, left, Bacterial vaginosis, Crohn disease, Depression (08/05/2017), Drug-induced pancreatitis (imuran), Encounter for blood transfusion, Generalized anxiety disorder, Genital HSV, GERD (gastroesophageal reflux disease), Hypertension, Ileostomy in place (07/09/2012), Kidney stone, Long QT syndrome, Melanoma in situ (excised-buttocks 2018, para-stomal site 2019), Moderate episode of recurrent major depressive disorder (02/02/2024), Multiple thyroid nodules (11/27/2018), Osteopenia of multiple sites (01/07/2019), Pancreas cyst (tail, 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), Recurrent Clostridioides difficile diarrhea, and Recurrent UTI (04/03/2013).    Surgical History:  has a past surgical history that includes Total colectomy; Ileostomy; Colon surgery; Upper gastrointestinal endoscopy; Colonoscopy; [facility]; Appendectomy; Bladder surgery; Skin biopsy; Abdominal surgery; Oophorectomy (Right, 04/16/2015); Portacath placement (02/21/2017); Total abdominal hysterectomy (04/16/2015); Small intestine surgery; Tubal ligation (06/06/2012); Bilateral salpingo-oophorectomy (BSO) (Bilateral, 05/30/2019); Lysis of adhesions (N/A, 05/30/2019); Excision of melanoma (07/17/2019); Diagnostic laparoscopy (N/A, 07/09/2020);  Laparoscopic lysis of adhesions (N/A, 07/09/2020); Cystoscopy w/ ureteral stent placement (Left, 09/15/2020); Ureteroscopy (Left, 09/23/2020); Cystoscopy (09/23/2020); Laser lithotripsy (09/23/2020); Ureteroscopic removal of ureteric calculus (09/23/2020); breast lift; Breast surgery; Augmentation of breast (Bilateral, 06/2022); Arthroplasty of shoulder (Left, 7/18/2023); Esophagogastroduodenoscopy (N/A, 1/3/2024); Ileoscopy (N/A, 1/3/2024); Ileoscopy (N/A, 1/24/2024); Ileoscopy (N/A, 6/4/2024); Insertion of tunneled central venous catheter (CVC) with subcutaneous port (Right, 9/10/2024); and Mediport removal (Left, 9/10/2024).    Family History: family history includes Breast cancer (age of onset: 41) in her maternal cousin; Cancer in her father and maternal grandfather; Colon cancer in her father; Crohn's disease in her brother and daughter; Diabetes in her maternal grandfather and paternal grandfather; Endometrial cancer in her maternal aunt; Hearing loss in her paternal grandmother; Heart disease in her maternal grandfather; Hyperlipidemia in her father; Hypertension in her mother; Liver cancer in her father; Skin cancer in her maternal grandfather..     Social History:  reports that she has never smoked. She has been exposed to tobacco smoke. She has never used smokeless tobacco. She reports that she does not currently use alcohol. She reports that she does not use drugs.    Review of patient's allergies indicates:   Allergen Reactions    Azathioprine sodium Other (See Comments)     Other reaction(s): pancreatitis  Other reaction(s): pancreatitis    Methotrexate analogues Other (See Comments)     leukopenia    Stelara [ustekinumab] Other (See Comments)     Multiple infections    Zofran [ondansetron hcl (pf)] Other (See Comments)     Per patient causes prolong QT    Vancomycin analogues Other (See Comments)     Made her red    Azathioprine      Other reaction(s): Unknown    Methotrexate      Other reaction(s):  infection-    Morphine Itching and Other (See Comments)     Other reaction(s): Itching    Zofran [ondansetron hcl]      Other reaction(s): Hives    Bactrim [sulfamethoxazole-trimethoprim] Palpitations    Ciprofloxacin Palpitations       Medications:   Medications Prior to Admission   Medication Sig Dispense Refill Last Dose/Taking    clonazePAM (KLONOPIN) 1 MG tablet Take 1 tablet (1 mg total) by mouth 2 (two) times daily. 60 tablet 2 Past Week    cyclobenzaprine (FLEXERIL) 10 MG tablet Take 1 tablet (10 mg total) by mouth every evening as needed for muscle pain 30 tablet 2 Past Week    nadoloL (CORGARD) 40 MG tablet Take 1 tablet (40 mg total) by mouth once daily. Patient needs appointment before next refill. 90 tablet 7 12/2/2024 Morning    promethazine (PHENERGAN) 25 MG tablet Take 1 tablet (25 mg total) by mouth every 6 (six) hours as needed for Nausea. 30 tablet 0 12/1/2024    valACYclovir (VALTREX) 500 MG tablet Take 1 tablet (500 mg total) by mouth once daily. 90 tablet 1 12/1/2024    dicyclomine (BENTYL) 20 mg tablet Take 1 tablet (20 mg total) by mouth every 6 (six) hours as needed (cramping). 60 tablet 4 Unknown    droNABinol (MARINOL) 10 MG capsule Take 1 capsule (10 mg total) by mouth once daily. 90 capsule 2 11/30/2024    estradiol 0.025 mg/24 hr 0.025 mg/24 hr Place 1 patch onto the skin once a week. (Patient taking differently: Place 1 patch onto the skin once a week. CHANGES ON MONDAYS) 4 patch 11     gabapentin (NEURONTIN) 300 MG capsule Take 1 capsule (300 mg total) by mouth 2 (two) times daily. 60 capsule 4 11/30/2024    loperamide (IMODIUM) 1 mg/7.5 mL solution Take 4 mg by mouth 3 (three) times daily as needed for Diarrhea.   Unknown    mirtazapine (REMERON SOL-TAB) 30 MG disintegrating tablet Take 1 tablet (30 mg total) by mouth nightly. 90 tablet 2 11/30/2024    multivitamin (THERAGRAN) per tablet Take 1 tablet by mouth once daily.   11/30/2024    pantoprazole (PROTONIX) 40 MG tablet Take 1  tablet (40 mg total) by mouth 2 (two) times daily. 60 tablet 12     tamsulosin (FLOMAX) 0.4 mg Cap Take 1 capsule (0.4 mg total) by mouth once daily. 30 capsule 1 Unknown    vedolizumab (ENTYVIO) 300 mg SolR injection Inject 300 mg into the vein every 8 weeks.   Unknown    zolpidem (AMBIEN) 10 mg Tab Take 1 tablet (10 mg total) by mouth every evening. 30 tablet 2 11/30/2024       Physical Exam:    Vital Signs:   Vitals:    12/02/24 0958   BP: 121/84   Pulse: 79   Resp: 18   Temp: 98.6 °F (37 °C)       General Appearance: Well appearing in no acute distress  Abdomen: Soft, non tender, non distended with normal bowel sounds, no masses    Labs:  Lab Results   Component Value Date    WBC 5.73 10/23/2024    HGB 12.3 10/23/2024    HCT 38.0 10/23/2024     10/23/2024    CHOL 192 01/02/2024    TRIG 150 01/02/2024    HDL 40 01/02/2024    ALT 32 10/18/2024    AST 48 (H) 10/18/2024     10/23/2024     10/23/2024    K 3.6 10/23/2024    K 3.6 10/23/2024     10/23/2024     10/23/2024    CREATININE 0.7 10/23/2024    CREATININE 0.7 10/23/2024    BUN 6 10/23/2024    BUN 6 10/23/2024    CO2 25 10/23/2024    CO2 25 10/23/2024    TSH 2.386 01/02/2024    INR 1.0 07/16/2024    HGBA1C 5.1 01/01/2024       I have explained the risks and benefits of this endoscopic procedure to the patient including but not limited to bleeding, inflammation, infection, perforation, and death.      Peace Garcia MD

## 2024-12-03 ENCOUNTER — TELEPHONE (OUTPATIENT)
Dept: UROLOGY | Facility: CLINIC | Age: 44
End: 2024-12-03
Payer: COMMERCIAL

## 2024-12-03 ENCOUNTER — OFFICE VISIT (OUTPATIENT)
Dept: UROLOGY | Facility: CLINIC | Age: 44
End: 2024-12-03
Payer: COMMERCIAL

## 2024-12-03 VITALS
WEIGHT: 112.19 LBS | DIASTOLIC BLOOD PRESSURE: 86 MMHG | HEART RATE: 74 BPM | BODY MASS INDEX: 21.2 KG/M2 | SYSTOLIC BLOOD PRESSURE: 118 MMHG

## 2024-12-03 DIAGNOSIS — N39.0 RECURRENT UTI: Primary | ICD-10-CM

## 2024-12-03 PROCEDURE — 99999 PR PBB SHADOW E&M-EST. PATIENT-LVL III: CPT | Mod: PBBFAC,,, | Performed by: UROLOGY

## 2024-12-03 RX ORDER — LIDOCAINE HYDROCHLORIDE 20 MG/ML
JELLY TOPICAL ONCE
OUTPATIENT
Start: 2024-12-03 | End: 2024-12-03

## 2024-12-03 RX ORDER — DOXYCYCLINE HYCLATE 100 MG
100 TABLET ORAL ONCE
OUTPATIENT
Start: 2024-12-03 | End: 2024-12-03

## 2024-12-03 NOTE — PROGRESS NOTES
"CHIEF COMPLAINT:    Mrs. Salgado is a 44 y.o. female presenting for a consultation at the request of Dr. Luis Madden. Patient presents with recurrent UTI.  She has been seen in the urology department in the past, this is her first evaluation with an [facility] specialist    PRESENTING ILLNESS:    Ivy Salgado is a 44 y.o. female who presents with a history of  Crohn's disease, who is status post colectomy, managed with ileostomy.  She states she has had recurrent UTI's.  Cultures are positive.   She had a completion proctocolectomy with end ileostomy in 2012.      Onset of symptoms:  before the ileostomy.  When she was a child, she had an enterovesical fistula and had surgery.      Cultures:      2024 Ewingella americana R ampicillin, I nitrofurantoin, otherwise, pan sensitive  7/3/2024 Klebsiella pneumonia I nitrofurantoin  2024 pan sensitive E coli  2024  no growth  2024 ESBL Klebsiella pneumoniae  3/18/2024 E [place] sensitive    Symptoms:  dysuria, significant pain when passing "tissue" urgency, frequency.     Risk factors:     Bowel movements:  has an ileostomy   Menopausal status:    Using Vaginal Estrogen:  not using using a patch     Reason why not using Vaginal Estrogen:   Sexually active:  yes   fluid intake:  increased due to ileostomy   Pad use:  none    Preventative measures:   Cranberry supplements:  taken in the past    , hysterectomy, sexually active, bowels managed with an ileostomy    REVIEW OF SYSTEMS:    Review of Systems   Constitutional: Negative.    HENT: Negative.     Eyes: Negative.    Respiratory: Negative.     Cardiovascular: Negative.    Gastrointestinal:         Ileostomy   Genitourinary: Negative.    Musculoskeletal:  Positive for joint pain.   Skin: Negative.    Neurological: Negative.    Endo/Heme/Allergies: Negative.    Psychiatric/Behavioral: Negative.         PATIENT HISTORY:    Past Medical History:   Diagnosis Date    Abnormal Pap smear     " Alkaline phosphatase elevation- based on lab from 4/9/2019 05/28/2019    Arthritis     Avascular necrosis of bone of hip, left     Bacterial vaginosis     Crohn disease     Depression 08/05/2017    Drug-induced pancreatitis (imuran)     Encounter for blood transfusion     Generalized anxiety disorder     Genital HSV     GERD (gastroesophageal reflux disease)     Hypertension     Ileostomy in place 07/09/2012    Kidney stone     Long QT syndrome     Melanoma in situ (excised-buttocks 2018, para-stomal site 2019)     Moderate episode of recurrent major depressive disorder 02/02/2024    Multiple thyroid nodules 11/27/2018    Osteopenia of multiple sites 01/07/2019    Pancreas cyst (tail, 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)     Recurrent Clostridioides difficile diarrhea     Recurrent UTI 04/03/2013       Past Surgical History:   Procedure Laterality Date    ABDOMINAL SURGERY      APPENDECTOMY      ARTHROPLASTY OF SHOULDER Left 7/18/2023    Procedure: ARTHROPLASTY, SHOULDER;  Surgeon: Sharon Babb MD;  Location: [facility];  Service: Orthopedics;  Laterality: Left;    AUGMENTATION OF BREAST Bilateral 06/2022    gel implants    BILATERAL SALPINGO-OOPHORECTOMY (BSO) Bilateral 05/30/2019    Procedure: SALPINGO-OOPHORECTOMY, BILATERAL;  Surgeon: Rupa German MD;  Location: [facility] OR [address] [place] [address];  Service: OB/GYN;  Laterality: Bilateral;    BLADDER SURGERY      partial cystectomy due to fistula    breast lift      BREAST SURGERY      [facility]      COLON SURGERY      COLONOSCOPY      CYSTOSCOPY  09/23/2020    Procedure: CYSTOSCOPY;  Surgeon: Sascha Florentino MD;  [facility];  Service: Urology;;    CYSTOSCOPY W/ URETERAL STENT PLACEMENT Left 09/15/2020    Procedure: CYSTOSCOPY, WITH URETERAL STENT INSERTION;  Surgeon: Sascha Florentino MD;  [facility];  Service: Urology;  Laterality: Left;    DIAGNOSTIC LAPAROSCOPY N/A 07/09/2020    Procedure: LAPAROSCOPY, DIAGNOSTIC;  Surgeon: Gilson El MD;  Location: [facility] OR;   Service: OB/GYN;  Laterality: N/A;    ESOPHAGOGASTRODUODENOSCOPY N/A 1/3/2024    Procedure: EGD (ESOPHAGOGASTRODUODENOSCOPY);  Surgeon: Lily Lind MD;  Location: [facility] ENDO (2ND FLR);  Service: Endoscopy;  Laterality: N/A;    EXCISION OF MELANOMA  07/17/2019    ILEOSCOPY N/A 1/3/2024    Procedure: ILEOSCOPY;  Surgeon: Lily Lind MD;  Location: [facility] ENDO (2ND FLR);  Service: Endoscopy;  Laterality: N/A;    ILEOSCOPY N/A 1/24/2024    Procedure: ILEOSCOPY;  Surgeon: Lilian Navarro MD;  Location: [facility] ENDO (2ND FLR);  Service: Endoscopy;  Laterality: N/A;  through stoma    ILEOSCOPY N/A 6/4/2024    Procedure: ILEOSCOPY;  Surgeon: Peace Garcia MD;  Location: [facility] ENDO (4TH FLR);  Service: Endoscopy;  Laterality: N/A;  Ref By:,instr sent via [place],  received urgent message to reschedule pt from 7/15  to may or june-updated prep instructions sent-   5/29/24- precall complete - ERW    ILEOSCOPY N/A 12/2/2024    Procedure: ILEOSCOPY;  Surgeon: Peace Garcia MD;  Location: [facility] (4TH FLR);  Service: Endoscopy;  Laterality: N/A;  Ref By:,[place],half miralax.  11/22 lvm for precall-  11/27/24 attempted precall no answer LVM   11/29-LVM for pre call.  No answe.-/  11/29-pt lvm confirming appt-KPvt    ILEOSTOMY      INSERTION OF TUNNELED CENTRAL VENOUS CATHETER (CVC) WITH SUBCUTANEOUS PORT Right 9/10/2024    Procedure: INSERTION, SINGLE LUMEN CATHETER WITH PORT, WITH FLUOROSCOPIC GUIDANCE, Rt neck or chest, prefers chest;  Surgeon: Vinh Lloyd MD;  Location: [facility] OR 2ND FLR;  Service: General;  Laterality: Right;    LAPAROSCOPIC LYSIS OF ADHESIONS N/A 07/09/2020    Procedure: LYSIS, ADHESIONS, LAPAROSCOPIC;  Surgeon: Gilson El MD;  Location: [facility] OR;  Service: OB/GYN;  Laterality: N/A;    LASER LITHOTRIPSY  09/23/2020    Procedure: LITHOTRIPSY, USING LASER;  Surgeon: Sascha Florentino MD;  Location: [facility] OR [address] [place] [address];  Service: Urology;;    LYSIS OF  ADHESIONS N/A 05/30/2019    Procedure: LYSIS, ADHESIONS;  Surgeon: Rupa German MD;  Location: [facility] OR 2ND FLR;  Service: OB/GYN;  Laterality: N/A;    MEDIPORT REMOVAL Left 9/10/2024    Procedure: REMOVAL, CATHETER, CENTRAL VENOUS, TUNNELED, WITH PORT, Left side;  Surgeon: Vinh Lloyd MD;  Location: [facility] OR 2ND FLR;  Service: General;  Laterality: Left;    OOPHORECTOMY Right 04/16/2015    PORTACATH PLACEMENT  02/21/2017    SKIN BIOPSY      SMALL INTESTINE SURGERY      age 18 Y    TOTAL ABDOMINAL HYSTERECTOMY  04/16/2015    TOTAL COLECTOMY      TUBAL LIGATION  06/06/2012    UPPER GASTROINTESTINAL ENDOSCOPY      URETEROSCOPIC REMOVAL OF URETERIC CALCULUS  09/23/2020    Procedure: REMOVAL, CALCULUS, URETER, URETEROSCOPIC;  Surgeon: Sascha Florentino MD;  Location: [facility] OR 1ST FLR;  Service: Urology;;    URETEROSCOPY Left 09/23/2020    Procedure: URETEROSCOPY;  Surgeon: Sascha Florentino MD;  Location: [facility] OR 1ST FLR;  Service: Urology;  Laterality: Left;       Family History   Problem Relation Name Age of Onset    Diabetes Paternal Grandfather Stephen     Hearing loss Paternal Grandmother Viviane     Cancer Maternal Grandfather Philippe         Skin    Skin cancer Maternal Grandfather Philippe     Diabetes Maternal Grandfather Philippe     Heart disease Maternal Grandfather Philippe     Colon cancer Father Milan     Cancer Father Milan         Colon    Liver cancer Father Milan     Hyperlipidemia Father Milan     Hypertension Mother Shaila     Crohn's disease Brother      Endometrial cancer Maternal Aunt      Breast cancer Maternal Cousin  41    Crohn's disease Daughter      Ovarian cancer Neg Hx      Melanoma Neg Hx         Social History     Socioeconomic History    Marital status: Single   Occupational History     Employer: [employer]   Tobacco Use    Smoking status: Never     Passive exposure: Past    Smokeless tobacco: Never   Substance and Sexual Activity    Alcohol use: Not Currently      Alcohol/week: 0.0 standard drinks of alcohol    Drug use: No    Sexual activity: Yes     Partners: Male     Birth control/protection: See Surgical Hx     Comment: HYST   Other Topics Concern    Are you pregnant or think you may be? No    Breast-feeding No     Social Drivers of Health     Financial Resource Strain: High Risk (11/19/2024)    Overall Financial Resource Strain (CARDIA)     Difficulty of Paying Living Expenses: Hard   Food Insecurity: Food Insecurity Present (11/19/2024)    Hunger Vital Sign     Worried About Running Out of Food in the Last Year: Sometimes true     Ran Out of Food in the Last Year: Sometimes true   Transportation Needs: No Transportation Needs (11/6/2024)    PRAPARE - Transportation     Lack of Transportation (Medical): No     Lack of Transportation (Non-Medical): No   Physical Activity: Insufficiently Active (11/19/2024)    Exercise Vital Sign     Days of Exercise per Week: 3 days     Minutes of Exercise per Session: 40 min   Stress: Stress Concern Present (11/19/2024)    [employer] [place] [employer] - Occupational Stress Questionnaire     Feeling of Stress : Rather much   Housing Stability: High Risk (11/19/2024)    Housing Stability Vital Sign     Unable to Pay for Housing in the Last Year: Yes     Homeless in the Last Year: No       Allergies:  Azathioprine sodium, Methotrexate analogues, Stelara [ustekinumab], Zofran [ondansetron hcl (pf)], Vancomycin analogues, Azathioprine, Methotrexate, Morphine, Zofran [ondansetron hcl], Bactrim [sulfamethoxazole-trimethoprim], and Ciprofloxacin    Medications:  Outpatient Encounter Medications as of 12/3/2024   Medication Sig Dispense Refill    clonazePAM (KLONOPIN) 1 MG tablet Take 1 tablet (1 mg total) by mouth 2 (two) times daily. 60 tablet 2    cyclobenzaprine (FLEXERIL) 10 MG tablet Take 1 tablet (10 mg total) by mouth every evening as needed for muscle pain 30 tablet 2    dicyclomine (BENTYL) 20 mg tablet Take 1 tablet (20  mg total) by mouth every 6 (six) hours as needed (cramping). 60 tablet 4    droNABinol (MARINOL) 10 MG capsule Take 1 capsule (10 mg total) by mouth once daily. 90 capsule 2    estradiol 0.025 mg/24 hr 0.025 mg/24 hr Place 1 patch onto the skin once a week. (Patient taking differently: Place 1 patch onto the skin once a week. CHANGES ON MONDAYS) 4 patch 11    gabapentin (NEURONTIN) 300 MG capsule Take 1 capsule (300 mg total) by mouth 2 (two) times daily. 60 capsule 4    loperamide (IMODIUM) 1 mg/7.5 mL solution Take 4 mg by mouth 3 (three) times daily as needed for Diarrhea.      mirtazapine (REMERON SOL-TAB) 30 MG disintegrating tablet Take 1 tablet (30 mg total) by mouth nightly. 90 tablet 2    multivitamin (THERAGRAN) per tablet Take 1 tablet by mouth once daily.      nadoloL (CORGARD) 40 MG tablet Take 1 tablet (40 mg total) by mouth once daily. Patient needs appointment before next refill. 90 tablet 7    pantoprazole (PROTONIX) 40 MG tablet Take 1 tablet (40 mg total) by mouth 2 (two) times daily. 60 tablet 12    promethazine (PHENERGAN) 25 MG tablet Take 1 tablet (25 mg total) by mouth every 6 (six) hours as needed for Nausea. 30 tablet 0    tamsulosin (FLOMAX) 0.4 mg Cap Take 1 capsule (0.4 mg total) by mouth once daily. 30 capsule 1    valACYclovir (VALTREX) 500 MG tablet Take 1 tablet (500 mg total) by mouth once daily. 90 tablet 1    vedolizumab (ENTYVIO) 300 mg SolR injection Inject 300 mg into the vein every 8 weeks.      zolpidem (AMBIEN) 10 mg Tab Take 1 tablet (10 mg total) by mouth every evening. 30 tablet 2    [DISCONTINUED] cyclobenzaprine (FLEXERIL) 10 MG tablet Take 1 tablet (10 mg total) by mouth every evening as needed for muscle pain 30 tablet 2    [DISCONTINUED] oxyCODONE (ROXICODONE) 5 MG immediate release tablet Take 1 tablet (5 mg total) by mouth every 6 (six) hours as needed for Pain. 5 tablet 0    [DISCONTINUED] zolpidem (AMBIEN) 10 mg Tab Take 1 tablet (10 mg total) by mouth every  evening. 30 tablet 2     Facility-Administered Encounter Medications as of 12/3/2024   Medication Dose Route Frequency Provider Last Rate Last Admin    [DISCONTINUED] 0.9% NaCl infusion   Intravenous Continuous Peace Garcia MD        [DISCONTINUED] haloperidol lactate injection 0.5 mg  0.5 mg Intravenous Q10 Min PRN Augustin Alexander MD        [DISCONTINUED] heparin, porcine (PF) (heparin flush 100 units/mL) 100 unit/mL injection flush             [DISCONTINUED] propofol (DIPRIVAN) 10 mg/mL infusion   Intravenous PRN Juma Benoit CRNA   Stopped at 12/02/24 1049         PHYSICAL EXAMINATION:    The patient generally appears in good health, is appropriately interactive, and is in no apparent distress.    Skin: No lesions.    Mental: Cooperative with normal affect.    Neuro: Grossly intact.    HEENT: Normal. No evidence of lymphadenopathy.    Chest:  normal inspiratory effort.    Abdomen: Soft, non-tender. No masses or organomegaly. Bladder is not palpable. No evidence of flank discomfort. No evidence of inguinal hernia.    Extremities: No clubbing, cyanosis, or edema    NOTE:  the exam was carried out with a nurse chaperone present  Normal external female genitalia  She has no vaginal atrophy  Urethral meatus is normal  Urethra and bladder are nontender to bimanual exam  Well supported anteriorly and posteriorly   Uterus and cervix are surgically absent  No adnexal masses    LABS:    Lab Results   Component Value Date    BUN 6 10/23/2024    BUN 6 10/23/2024    CREATININE 0.7 10/23/2024    CREATININE 0.7 10/23/2024     10/18/2024 CT scan no stones, no hydro    11/7/2024 MRI enterography images were reviewed. There was no fistula of the bladder.  Nice plane around the bladder with no abscesses or fistula tracks.  Nor is there focal thickening of the bladder.      UA 1.030, pH 5, tr leuk,  otherwise, negative.     IMPRESSION:    Recurrent UTI, mechanism appears to be reinfection due to the cultures  Remote history  of enterovesical fistula from Crohn's disease    PLAN:    1. Cystoscopy to complete her workup  2.  Information sheet for preventing bladder infections provided.      I spent a total of 45 minutes on the day of the visit.  This includes face to face time and non-face to face time preparing to see the patient (eg, review of tests), obtaining and/or reviewing separately obtained history, documenting clinical information in the electronic or other health record, independently interpreting results and communicating results to the patient/family/caregiver, or care coordinator.    Copy to: Luis Madden MD

## 2024-12-03 NOTE — TELEPHONE ENCOUNTER
----- Message from Med Assistant Kalie sent at 12/3/2024  8:57 AM CST -----  Regarding: Cysto Needed  Good morning,    This patient needs a cystoscopy, orders are in. Thank you so much!!    Kalie

## 2024-12-03 NOTE — TELEPHONE ENCOUNTER
----- Message from Nurse Velasquez sent at 12/3/2024  4:51 PM CST -----  Regarding: FW: PT ADVICE  Contact: [phone]    ----- Message -----  From: Cara Gallagher  Sent: 12/3/2024   4:34 PM CST  To: Orly Elliott Staff  Subject: PT ADVICE                                        Pt is returning a missed call from someone in the office and is asking for a return call back soon. Thanks.     Reason for call:MISS CALL      Patient's DX:     Patient requesting call back or [facility] msg: [phone]

## 2024-12-04 ENCOUNTER — PATIENT MESSAGE (OUTPATIENT)
Dept: UROLOGY | Facility: CLINIC | Age: 44
End: 2024-12-04
Payer: COMMERCIAL

## 2024-12-04 ENCOUNTER — HOSPITAL ENCOUNTER (OUTPATIENT)
Dept: RADIOLOGY | Facility: HOSPITAL | Age: 44
Discharge: HOME | End: 2024-12-04
Payer: COMMERCIAL

## 2024-12-04 ENCOUNTER — PATIENT MESSAGE (OUTPATIENT)
Dept: GASTROENTEROLOGY | Facility: CLINIC | Age: 44
End: 2024-12-04
Payer: COMMERCIAL

## 2024-12-04 DIAGNOSIS — K86.2 PANCREAS CYST: ICD-10-CM

## 2024-12-04 DIAGNOSIS — N39.0 RECURRENT UTI: Primary | ICD-10-CM

## 2024-12-04 LAB
FINAL PATHOLOGIC DIAGNOSIS: NORMAL
GROSS: NORMAL
Lab: NORMAL

## 2024-12-04 PROCEDURE — 25500020 PHARM REV CODE 255

## 2024-12-04 PROCEDURE — 74183 MRI ABD W/O CNTR FLWD CNTR: CPT | Mod: 26,,, | Performed by: RADIOLOGY

## 2024-12-04 PROCEDURE — A9585 GADOBUTROL INJECTION: HCPCS

## 2024-12-04 PROCEDURE — 74183 MRI ABD W/O CNTR FLWD CNTR: CPT | Mod: TC

## 2024-12-04 RX ORDER — GADOBUTROL 604.72 MG/ML
10 INJECTION INTRAVENOUS
Status: COMPLETED | OUTPATIENT
Start: 2024-12-04 | End: 2024-12-04

## 2024-12-04 RX ADMIN — GADOBUTROL 10 ML: 604.72 INJECTION INTRAVENOUS at 09:12

## 2024-12-05 ENCOUNTER — PATIENT MESSAGE (OUTPATIENT)
Dept: GASTROENTEROLOGY | Facility: CLINIC | Age: 44
End: 2024-12-05
Payer: COMMERCIAL

## 2024-12-06 NOTE — PRE-PROCEDURE INSTRUCTIONS
Pt reviewed by JOSEPHINE MCKEON. OK to proceed at [facility]. Chart reviewed and OK'd by Dr. Walker.

## 2024-12-06 NOTE — TELEPHONE ENCOUNTER
Order placed for cystoscopy possible bladder biopsy and fulguration under sedation at [facility] on 12/11/2024

## 2024-12-10 NOTE — PRE-PROCEDURE INSTRUCTIONS
Patient was informed of pre-procedure instructions and arrival time. Pt verbalized understanding and is to be accompanied by dad. PATIENT CONFIRMED ARRIVAL TIME OF 1200.

## 2024-12-11 ENCOUNTER — HOSPITAL ENCOUNTER (OUTPATIENT)
Facility: HOSPITAL | Age: 44
Discharge: HOME | End: 2024-12-11
Attending: UROLOGY | Admitting: UROLOGY
Payer: COMMERCIAL

## 2024-12-11 ENCOUNTER — TELEPHONE (OUTPATIENT)
Facility: CLINIC | Age: 44
End: 2024-12-11
Payer: COMMERCIAL

## 2024-12-11 ENCOUNTER — ANESTHESIA (OUTPATIENT)
Dept: SURGERY | Facility: HOSPITAL | Age: 44
End: 2024-12-11
Payer: COMMERCIAL

## 2024-12-11 ENCOUNTER — ANESTHESIA EVENT (OUTPATIENT)
Dept: SURGERY | Facility: HOSPITAL | Age: 44
End: 2024-12-11
Payer: COMMERCIAL

## 2024-12-11 VITALS
RESPIRATION RATE: 18 BRPM | DIASTOLIC BLOOD PRESSURE: 82 MMHG | SYSTOLIC BLOOD PRESSURE: 133 MMHG | BODY MASS INDEX: 21.71 KG/M2 | TEMPERATURE: 98 F | WEIGHT: 115 LBS | OXYGEN SATURATION: 98 % | HEART RATE: 66 BPM | HEIGHT: 61 IN

## 2024-12-11 DIAGNOSIS — N39.0 RECURRENT UTI: Primary | ICD-10-CM

## 2024-12-11 LAB
B-HCG UR QL: NEGATIVE
BILIRUBIN, POC UA: NEGATIVE
BLOOD, POC UA: NEGATIVE
CLARITY, UA: CLEAR
COLOR, UA: YELLOW
CTP QC/QA: YES
GLUCOSE, POC UA: NEGATIVE
KETONES, POC UA: NEGATIVE
LEUKOCYTE EST, POC UA: NEGATIVE
NITRITE, POC UA: NEGATIVE
PH UR STRIP: 5.5 [PH] (ref 5–8)
PROTEIN, POC UA: NEGATIVE
SPECIFIC GRAVITY, POC UA: 1.01 (ref 1–1.03)
UROBILINOGEN, POC UA: 0.2 E.U./DL

## 2024-12-11 PROCEDURE — 25000003 PHARM REV CODE 250: Performed by: UROLOGY

## 2024-12-11 PROCEDURE — 81025 URINE PREGNANCY TEST: CPT | Performed by: UROLOGY

## 2024-12-11 PROCEDURE — 25000003 PHARM REV CODE 250: Performed by: NURSE ANESTHETIST, CERTIFIED REGISTERED

## 2024-12-11 PROCEDURE — 94761 N-INVAS EAR/PLS OXIMETRY MLT: CPT

## 2024-12-11 PROCEDURE — 63600175 PHARM REV CODE 636 W HCPCS: Performed by: ANESTHESIOLOGY

## 2024-12-11 PROCEDURE — 52000 CYSTOURETHROSCOPY: CPT | Mod: 59,,, | Performed by: UROLOGY

## 2024-12-11 PROCEDURE — 99900035 HC TECH TIME PER 15 MIN (STAT)

## 2024-12-11 PROCEDURE — 37000008 HC ANESTHESIA 1ST 15 MINUTES: Performed by: UROLOGY

## 2024-12-11 PROCEDURE — 25500020 PHARM REV CODE 255: Performed by: UROLOGY

## 2024-12-11 PROCEDURE — 63600175 PHARM REV CODE 636 W HCPCS: Performed by: NURSE ANESTHETIST, CERTIFIED REGISTERED

## 2024-12-11 PROCEDURE — 63600175 PHARM REV CODE 636 W HCPCS: Performed by: STUDENT IN AN ORGANIZED HEALTH CARE EDUCATION/TRAINING PROGRAM

## 2024-12-11 PROCEDURE — 63600175 PHARM REV CODE 636 W HCPCS: Performed by: UROLOGY

## 2024-12-11 PROCEDURE — 71000033 HC RECOVERY, INTIAL HOUR: Performed by: UROLOGY

## 2024-12-11 PROCEDURE — 36000706: Performed by: UROLOGY

## 2024-12-11 PROCEDURE — 36000707: Performed by: UROLOGY

## 2024-12-11 PROCEDURE — 37000009 HC ANESTHESIA EA ADD 15 MINS: Performed by: UROLOGY

## 2024-12-11 PROCEDURE — 71000016 HC POSTOP RECOV ADDL HR: Performed by: UROLOGY

## 2024-12-11 PROCEDURE — 51600 INJECTION FOR BLADDER X-RAY: CPT | Mod: ,,, | Performed by: UROLOGY

## 2024-12-11 PROCEDURE — 71000015 HC POSTOP RECOV 1ST HR: Performed by: UROLOGY

## 2024-12-11 PROCEDURE — 74430 CONTRAST X-RAY BLADDER: CPT | Mod: 26,,, | Performed by: UROLOGY

## 2024-12-11 PROCEDURE — 25000003 PHARM REV CODE 250: Performed by: ANESTHESIOLOGY

## 2024-12-11 RX ORDER — HYDROMORPHONE HYDROCHLORIDE 1 MG/ML
0.2 INJECTION, SOLUTION INTRAMUSCULAR; INTRAVENOUS; SUBCUTANEOUS EVERY 5 MIN PRN
Status: COMPLETED | OUTPATIENT
Start: 2024-12-11 | End: 2024-12-11

## 2024-12-11 RX ORDER — HEPARIN 100 UNIT/ML
5 SYRINGE INTRAVENOUS ONCE
Status: COMPLETED | OUTPATIENT
Start: 2024-12-11 | End: 2024-12-11

## 2024-12-11 RX ORDER — KETOROLAC TROMETHAMINE 30 MG/ML
15 INJECTION, SOLUTION INTRAMUSCULAR; INTRAVENOUS EVERY 8 HOURS PRN
Status: DISCONTINUED | OUTPATIENT
Start: 2024-12-11 | End: 2024-12-11 | Stop reason: HOSPADM

## 2024-12-11 RX ORDER — OXYCODONE HYDROCHLORIDE 5 MG/1
5 TABLET ORAL EVERY 4 HOURS PRN
Status: DISCONTINUED | OUTPATIENT
Start: 2024-12-11 | End: 2024-12-11 | Stop reason: HOSPADM

## 2024-12-11 RX ORDER — FENTANYL CITRATE 50 UG/ML
INJECTION, SOLUTION INTRAMUSCULAR; INTRAVENOUS
Status: DISCONTINUED | OUTPATIENT
Start: 2024-12-11 | End: 2024-12-11

## 2024-12-11 RX ORDER — ESTRADIOL 0.03 MG/D
1 PATCH TRANSDERMAL WEEKLY
Qty: 4 PATCH | Refills: 11 | Status: SHIPPED | OUTPATIENT
Start: 2024-12-11 | End: 2025-12-11

## 2024-12-11 RX ORDER — PHENYLEPHRINE HYDROCHLORIDE 10 MG/ML
INJECTION INTRAVENOUS
Status: DISCONTINUED | OUTPATIENT
Start: 2024-12-11 | End: 2024-12-11

## 2024-12-11 RX ORDER — LIDOCAINE HYDROCHLORIDE 20 MG/ML
JELLY TOPICAL
Status: DISCONTINUED | OUTPATIENT
Start: 2024-12-11 | End: 2024-12-11 | Stop reason: HOSPADM

## 2024-12-11 RX ORDER — PROPOFOL 10 MG/ML
VIAL (ML) INTRAVENOUS
Status: DISCONTINUED | OUTPATIENT
Start: 2024-12-11 | End: 2024-12-11

## 2024-12-11 RX ORDER — CEFAZOLIN 2 G/1
2 INJECTION, POWDER, FOR SOLUTION INTRAMUSCULAR; INTRAVENOUS ONCE
Status: COMPLETED | OUTPATIENT
Start: 2024-12-11 | End: 2024-12-11

## 2024-12-11 RX ORDER — PROCHLORPERAZINE EDISYLATE 5 MG/ML
5 INJECTION INTRAMUSCULAR; INTRAVENOUS EVERY 30 MIN PRN
Status: DISCONTINUED | OUTPATIENT
Start: 2024-12-11 | End: 2024-12-11 | Stop reason: HOSPADM

## 2024-12-11 RX ORDER — MIDAZOLAM HYDROCHLORIDE 1 MG/ML
INJECTION, SOLUTION INTRAMUSCULAR; INTRAVENOUS
Status: DISCONTINUED | OUTPATIENT
Start: 2024-12-11 | End: 2024-12-11

## 2024-12-11 RX ORDER — PROPOFOL 10 MG/ML
VIAL (ML) INTRAVENOUS CONTINUOUS PRN
Status: DISCONTINUED | OUTPATIENT
Start: 2024-12-11 | End: 2024-12-11

## 2024-12-11 RX ORDER — DEXMEDETOMIDINE HYDROCHLORIDE 100 UG/ML
INJECTION, SOLUTION INTRAVENOUS
Status: DISCONTINUED | OUTPATIENT
Start: 2024-12-11 | End: 2024-12-11

## 2024-12-11 RX ORDER — HEPARIN SODIUM 1000 [USP'U]/ML
5000 INJECTION, SOLUTION INTRAVENOUS; SUBCUTANEOUS ONCE
Status: DISCONTINUED | OUTPATIENT
Start: 2024-12-11 | End: 2024-12-11

## 2024-12-11 RX ORDER — GLUCAGON 1 MG
1 KIT INJECTION
Status: DISCONTINUED | OUTPATIENT
Start: 2024-12-11 | End: 2024-12-11 | Stop reason: HOSPADM

## 2024-12-11 RX ADMIN — OXYCODONE 5 MG: 5 TABLET ORAL at 04:12

## 2024-12-11 RX ADMIN — PROPOFOL 50 MG: 10 INJECTION, EMULSION INTRAVENOUS at 02:12

## 2024-12-11 RX ADMIN — MIDAZOLAM HYDROCHLORIDE 2 MG: 1 INJECTION, SOLUTION INTRAMUSCULAR; INTRAVENOUS at 02:12

## 2024-12-11 RX ADMIN — DEXMEDETOMIDINE HYDROCHLORIDE 4 MCG: 100 INJECTION, SOLUTION INTRAVENOUS at 03:12

## 2024-12-11 RX ADMIN — HYDROMORPHONE HYDROCHLORIDE 0.2 MG: 1 INJECTION, SOLUTION INTRAMUSCULAR; INTRAVENOUS; SUBCUTANEOUS at 04:12

## 2024-12-11 RX ADMIN — PHENYLEPHRINE HYDROCHLORIDE 50 MCG: 10 INJECTION INTRAVENOUS at 03:12

## 2024-12-11 RX ADMIN — SODIUM CHLORIDE: 0.9 INJECTION, SOLUTION INTRAVENOUS at 02:12

## 2024-12-11 RX ADMIN — PROPOFOL 150 MCG/KG/MIN: 10 INJECTION, EMULSION INTRAVENOUS at 02:12

## 2024-12-11 RX ADMIN — KETOROLAC TROMETHAMINE 15 MG: 30 INJECTION, SOLUTION INTRAMUSCULAR; INTRAVENOUS at 04:12

## 2024-12-11 RX ADMIN — HEPARIN 500 UNITS: 100 SYRINGE at 05:12

## 2024-12-11 RX ADMIN — CEFAZOLIN 2 G: 2 INJECTION, POWDER, FOR SOLUTION INTRAMUSCULAR; INTRAVENOUS at 03:12

## 2024-12-11 RX ADMIN — FENTANYL CITRATE 50 MCG: 50 INJECTION, SOLUTION INTRAMUSCULAR; INTRAVENOUS at 03:12

## 2024-12-11 NOTE — PLAN OF CARE
Sri Putnam RN with infusion center on second floor of our facility contacted and agreed to come over and access port. Pt has infusions done here and Sri has accessed port previously. MD Aguirre to place order.

## 2024-12-11 NOTE — PLAN OF CARE
Chart reviewed. Pre-op nursing care completed per orders. Safe surgery checklist complete. Family at bedside, pt belongings placed in PACU locker 16. Waiting for H&P, anesthesia and procedural consent prior to procedure. Call button within reach.

## 2024-12-11 NOTE — PLAN OF CARE
Pt Resting in bed no s/s of distress RR even and unlabored VSS. Discharge instructions given and pt verbalized. Port flushed with 5ml of heparin. Port deaccessed. Pain under control. Note for work sent with patient.pt urinated 300cc. Pt ready for discharge home

## 2024-12-11 NOTE — DISCHARGE SUMMARY
[facility]  Discharge Note  Short Stay    Procedure(s) (LRB):  CYSTOSCOPY (N/A)  CYSTOGRAM      OUTCOME: Patient tolerated treatment/procedure well without complication and is now ready for discharge.    DISPOSITION: Home or Self Care    FINAL DIAGNOSIS:  Recurrent UTI    FOLLOWUP: In clinic    DISCHARGE INSTRUCTIONS:    Discharge Procedure Orders   Notify your health care provider if you experience any of the following:  temperature >100.4     Notify your health care provider if you experience any of the following:  persistent nausea and vomiting or diarrhea     Notify your health care provider if you experience any of the following:  severe uncontrolled pain     Notify your health care provider if you experience any of the following:  redness, tenderness, or signs of infection (pain, swelling, redness, odor or green/yellow discharge around incision site)     Notify your health care provider if you experience any of the following:  worsening rash     Notify your health care provider if you experience any of the following:  persistent dizziness, light-headedness, or visual disturbances        TIME SPENT ON DISCHARGE: 10 minutes    As above.

## 2024-12-11 NOTE — DISCHARGE INSTRUCTIONS
Post Cystoscopy Instructions  Do not strain to have a bowel movement  No strenuous exercise x 7 days  No driving while you are on narcotic pain medications or if your huffman  catheter is in place    You can expect:  To pass stone fragments if you had a stone procedure  Have pain when you void from your stent if you have a stent in place  See blood in your urine if you have a stent in place    If you have a catheter, please return to the ER if your catheter stops draining or you are having abdominal pain.    Call the doctor if:  Temperature is greater than 101F  Persistent vomiting and inability to keep food down  Inability to void if you do not have a catheter

## 2024-12-11 NOTE — PLAN OF CARE
MD Aguirre unsuccessful with IV attempt. Pt reports has port placement for biologic infusion. If unable to access port, pt states she will cancel procedure. Will attempt to get permission to access port.

## 2024-12-11 NOTE — TRANSFER OF CARE
"Anesthesia Transfer of Care Note    Patient: Ivy Salgado    Procedure(s) Performed: Procedure(s) (LRB):  CYSTOSCOPY (N/A)  CYSTOGRAM    Patient location: PACU    Anesthesia Type: general    Transport from OR: Transported from OR on room air with adequate spontaneous ventilation    Post pain: adequate analgesia    Post assessment: no apparent anesthetic complications and tolerated procedure well    Post vital signs: stable    Level of consciousness: lethargic and responds to stimulation    Nausea/Vomiting: no nausea/vomiting    Complications: none    Transfer of care protocol was followed      Last vitals: Visit Vitals  /82 (BP Location: Left arm, Patient Position: Lying)   Pulse 63   Temp 36.6 °C (97.9 °F) (Temporal)   Resp 16   Ht 5' 1" (1.549 m)   Wt 52.2 kg (115 lb)   LMP 03/30/2015   SpO2 99%   Breastfeeding No   BMI 21.73 kg/m²     "

## 2024-12-11 NOTE — LETTER
December 11, 2024         [address]  [place] [address]  Phone: [phone]       Patient: Ivy Salgado   YOB: 1980  Date of Visit: 12/11/2024    To Whom It May Concern:    John Salgado  was at [facility] on 12/11/2024. The patient may return to work/school on 12/12/2024 with restrictions no strenuous activity for 7 days. If you have any questions or concerns, or if I can be of further assistance, please do not hesitate to contact me.    Sincerely,    Patti Harris RN

## 2024-12-11 NOTE — ANESTHESIA POSTPROCEDURE EVALUATION
Anesthesia Post Evaluation    Patient: Ivy Salgado    Procedure(s) Performed: Procedure(s) (LRB):  CYSTOSCOPY (N/A)  CYSTOGRAM    Final Anesthesia Type: general      Patient location during evaluation: PACU  Patient participation: Yes- Able to Participate  Level of consciousness: awake and alert  Post-procedure vital signs: reviewed and stable  Pain management: adequate  Airway patency: patent    PONV status at discharge: No PONV  Anesthetic complications: no      Cardiovascular status: blood pressure returned to baseline  Respiratory status: unassisted  Hydration status: euvolemic  Follow-up not needed.          Vitals Value Taken Time   /65 12/11/24 1602   Temp 36.7 °C (98 °F) 12/11/24 1536   Pulse 70 12/11/24 1604   Resp 21 12/11/24 1604   SpO2 96 % 12/11/24 1604   Vitals shown include unfiled device data.      No case tracking events are documented in the log.      Pain/Michael Score: Pain Rating Prior to Med Admin: 10 (12/11/2024  4:00 PM)  Michael Score: 9 (12/11/2024  3:53 PM)

## 2024-12-11 NOTE — H&P
"CHIEF COMPLAINT:    Mrs. Salgado is a 44 y.o. female presenting for a cystoscopy for recurrent UTI    PRESENTING ILLNESS:    Ivy Salgado is a 44 y.o. female who presents with a history of  Crohn's disease, who is status post colectomy, managed with ileostomy.  She states she has had recurrent UTI's.  Cultures are positive.   She had a completion proctocolectomy with end ileostomy in 2012.      Onset of symptoms:  before the ileostomy.  When she was a child, she had an enterovesical fistula and had surgery.      Cultures:      2024 Ewingella americana R ampicillin, I nitrofurantoin, otherwise, pan sensitive  7/3/2024 Klebsiella pneumonia I nitrofurantoin  2024 pan sensitive E coli  2024  no growth  2024 ESBL Klebsiella pneumoniae  3/18/2024 E [place] sensitive    Symptoms:  dysuria, significant pain when passing "tissue" urgency, frequency.     Risk factors:     Bowel movements:  has an ileostomy   Menopausal status:    Using Vaginal Estrogen:  not using using a patch     Reason why not using Vaginal Estrogen:   Sexually active:  yes   fluid intake:  increased due to ileostomy   Pad use:  none    Preventative measures:   Cranberry supplements:  taken in the past    , hysterectomy, sexually active, bowels managed with an ileostomy    REVIEW OF SYSTEMS:    Review of Systems   Constitutional: Negative.    HENT: Negative.     Eyes: Negative.    Respiratory: Negative.     Cardiovascular: Negative.    Gastrointestinal:         Ileostomy   Genitourinary: Negative.    Musculoskeletal:  Positive for joint pain.   Skin: Negative.    Neurological: Negative.    Endo/Heme/Allergies: Negative.    Psychiatric/Behavioral: Negative.         PATIENT HISTORY:    Past Medical History:   Diagnosis Date    Abnormal Pap smear     Alkaline phosphatase elevation- based on lab from 2019    Arthritis     Avascular necrosis of bone of hip, left     Bacterial vaginosis     Crohn disease     " Depression 08/05/2017    Drug-induced pancreatitis (imuran)     Encounter for blood transfusion     Generalized anxiety disorder     Genital HSV     GERD (gastroesophageal reflux disease)     Hypertension     Ileostomy in place 07/09/2012    Kidney stone     Long QT syndrome     Melanoma in situ (excised-buttocks 2018, para-stomal site 2019)     Moderate episode of recurrent major depressive disorder 02/02/2024    Multiple thyroid nodules 11/27/2018    Osteopenia of multiple sites 01/07/2019    Pancreas cyst (tail, 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)     Recurrent Clostridioides difficile diarrhea     Recurrent UTI 04/03/2013       Past Surgical History:   Procedure Laterality Date    ABDOMINAL SURGERY      APPENDECTOMY      ARTHROPLASTY OF SHOULDER Left 7/18/2023    Procedure: ARTHROPLASTY, SHOULDER;  Surgeon: Sharon Babb MD;  Location: [facility] OR;  Service: Orthopedics;  Laterality: Left;    AUGMENTATION OF BREAST Bilateral 06/2022    gel implants    BILATERAL SALPINGO-OOPHORECTOMY (BSO) Bilateral 05/30/2019    Procedure: SALPINGO-OOPHORECTOMY, BILATERAL;  Surgeon: Rupa German MD;  Location: [address];  Service: OB/GYN;  Laterality: Bilateral;    BLADDER SURGERY      partial cystectomy due to fistula    breast lift      BREAST SURGERY      [facility]      COLON SURGERY      COLONOSCOPY      CYSTOSCOPY  09/23/2020    Procedure: CYSTOSCOPY;  Surgeon: Sascha Florentino MD;  Location: [facility] OR [address] [place] [address];  Service: Urology;;    CYSTOSCOPY W/ URETERAL STENT PLACEMENT Left 09/15/2020    Procedure: CYSTOSCOPY, WITH URETERAL STENT INSERTION;  Surgeon: Sascha Florentino MD;  [facility];  Service: Urology;  Laterality: Left;    DIAGNOSTIC LAPAROSCOPY N/A 07/09/2020    Procedure: LAPAROSCOPY, DIAGNOSTIC;  Surgeon: Gilson El MD;  Location: [facility] OR;  Service: OB/GYN;  Laterality: N/A;    ESOPHAGOGASTRODUODENOSCOPY N/A 1/3/2024    Procedure: EGD (ESOPHAGOGASTRODUODENOSCOPY);  Surgeon: Lily Lind MD;  Location: [facility]  ENDO (2ND FLR);  Service: Endoscopy;  Laterality: N/A;    EXCISION OF MELANOMA  07/17/2019    ILEOSCOPY N/A 1/3/2024    Procedure: ILEOSCOPY;  Surgeon: Lily Lind MD;  Location: [facility] ENDO (2ND FLR);  Service: Endoscopy;  Laterality: N/A;    ILEOSCOPY N/A 1/24/2024    Procedure: ILEOSCOPY;  Surgeon: Lilian Navarro MD;  Location: [facility] ENDO (2ND FLR);  Service: Endoscopy;  Laterality: N/A;  through stoma    ILEOSCOPY N/A 6/4/2024    Procedure: ILEOSCOPY;  Surgeon: Peace Garcia MD;  Location: [facility] ENDO (4TH FLR);  Service: Endoscopy;  Laterality: N/A;  Ref By:,[facility] sent via portal,  received urgent message to reschedule pt from 7/15  to may or june-updated prep instructions sent-   5/29/24- precall complete - ERW    ILEOSCOPY N/A 12/2/2024    Procedure: ILEOSCOPY;  Surgeon: Peace Garcia MD;  Location: [facility] ENDO (4TH FLR);  Service: Endoscopy;  Laterality: N/A;  Ref By:,[place],half miralax.  11/22 lvm for precall-  11/27/24 attempted precall no answer LVM   11/29-LVM for pre call.  No answe.-JM/ES  11/29-pt lvm confirming appt-KPvt    ILEOSTOMY      INSERTION OF TUNNELED CENTRAL VENOUS CATHETER (CVC) WITH SUBCUTANEOUS PORT Right 9/10/2024    Procedure: INSERTION, SINGLE LUMEN CATHETER WITH PORT, WITH FLUOROSCOPIC GUIDANCE, Rt neck or chest, prefers chest;  Surgeon: Vinh Lloyd MD;  Location: [facility] OR 2ND FLR;  Service: General;  Laterality: Right;    LAPAROSCOPIC LYSIS OF ADHESIONS N/A 07/09/2020    Procedure: LYSIS, ADHESIONS, LAPAROSCOPIC;  Surgeon: Gilson El MD;  Location: [facility] OR;  Service: OB/GYN;  Laterality: N/A;    LASER LITHOTRIPSY  09/23/2020    Procedure: LITHOTRIPSY, USING LASER;  Surgeon: Sascha Florentino MD;  Location: [facility] OR 1ST FLR;  Service: Urology;;    LYSIS OF ADHESIONS N/A 05/30/2019    Procedure: LYSIS, ADHESIONS;  Surgeon: Rupa German MD;  Location: [facility] OR 2ND FLR;  Service: OB/GYN;  Laterality: N/A;    MEDIPORT REMOVAL Left  9/10/2024    Procedure: REMOVAL, CATHETER, CENTRAL VENOUS, TUNNELED, WITH PORT, Left side;  Surgeon: Vinh Lloyd MD;  Location: [facility] OR 2ND FLR;  Service: General;  Laterality: Left;    OOPHORECTOMY Right 04/16/2015    PORTACATH PLACEMENT  02/21/2017    SKIN BIOPSY      SMALL INTESTINE SURGERY      age 18 Y    TOTAL ABDOMINAL HYSTERECTOMY  04/16/2015    TOTAL COLECTOMY      TUBAL LIGATION  06/06/2012    UPPER GASTROINTESTINAL ENDOSCOPY      URETEROSCOPIC REMOVAL OF URETERIC CALCULUS  09/23/2020    Procedure: REMOVAL, CALCULUS, URETER, URETEROSCOPIC;  Surgeon: Sascha Florentino MD;  Location: [facility] OR 1ST FLR;  Service: Urology;;    URETEROSCOPY Left 09/23/2020    Procedure: URETEROSCOPY;  Surgeon: Sascha Florentino MD;  Location: [facility] OR 1ST FLR;  Service: Urology;  Laterality: Left;       Family History   Problem Relation Name Age of Onset    Diabetes Paternal Grandfather Stephen     Hearing loss Paternal Grandmother Viviane     Cancer Maternal Grandfather Philippe         Skin    Skin cancer Maternal Grandfather Philippe     Diabetes Maternal Grandfather Philippe     Heart disease Maternal Grandfather Philippe     Colon cancer Father Milan     Cancer Father Milan         Colon    Liver cancer Father Milan     Hyperlipidemia Father Milan     Hypertension Mother Shaila     Crohn's disease Brother      Endometrial cancer Maternal Aunt      Breast cancer Maternal Cousin  41    Crohn's disease Daughter      Ovarian cancer Neg Hx      Melanoma Neg Hx         Social History     Socioeconomic History    Marital status: Single   Occupational History     Employer: [employer]   Tobacco Use    Smoking status: Never     Passive exposure: Past    Smokeless tobacco: Never   Substance and Sexual Activity    Alcohol use: Not Currently     Alcohol/week: 0.0 standard drinks of alcohol    Drug use: No    Sexual activity: Yes     Partners: Male     Birth control/protection: See Surgical Hx     Comment: HYST    Other Topics Concern    Are you pregnant or think you may be? No    Breast-feeding No     Social Drivers of Health     Financial Resource Strain: High Risk (11/19/2024)    Overall Financial Resource Strain (CARDIA)     Difficulty of Paying Living Expenses: Hard   Food Insecurity: Food Insecurity Present (11/19/2024)    Hunger Vital Sign     Worried About Running Out of Food in the Last Year: Sometimes true     Ran Out of Food in the Last Year: Sometimes true   Transportation Needs: No Transportation Needs (11/6/2024)    PRAPARE - Transportation     Lack of Transportation (Medical): No     Lack of Transportation (Non-Medical): No   Physical Activity: Insufficiently Active (11/19/2024)    Exercise Vital Sign     Days of Exercise per Week: 3 days     Minutes of Exercise per Session: 40 min   Stress: Stress Concern Present (11/19/2024)    [employer] - Occupational Stress Questionnaire     Feeling of Stress : Rather much   Housing Stability: High Risk (11/19/2024)    Housing Stability Vital Sign     Unable to Pay for Housing in the Last Year: Yes     Homeless in the Last Year: No       Allergies:  Azathioprine sodium, Methotrexate analogues, Stelara [ustekinumab], Zofran [ondansetron hcl (pf)], Vancomycin analogues, Azathioprine, Methotrexate, Morphine, Zofran [ondansetron hcl], Bactrim [sulfamethoxazole-trimethoprim], and Ciprofloxacin    Medications:  Outpatient Encounter Medications as of 12/3/2024   Medication Sig Dispense Refill    clonazePAM (KLONOPIN) 1 MG tablet Take 1 tablet (1 mg total) by mouth 2 (two) times daily. 60 tablet 2    cyclobenzaprine (FLEXERIL) 10 MG tablet Take 1 tablet (10 mg total) by mouth every evening as needed for muscle pain 30 tablet 2    dicyclomine (BENTYL) 20 mg tablet Take 1 tablet (20 mg total) by mouth every 6 (six) hours as needed (cramping). 60 tablet 4    droNABinol (MARINOL) 10 MG capsule Take 1 capsule (10 mg total) by mouth once daily. 90 capsule  2    estradiol 0.025 mg/24 hr 0.025 mg/24 hr Place 1 patch onto the skin once a week. (Patient taking differently: Place 1 patch onto the skin once a week. CHANGES ON MONDAYS) 4 patch 11    gabapentin (NEURONTIN) 300 MG capsule Take 1 capsule (300 mg total) by mouth 2 (two) times daily. 60 capsule 4    loperamide (IMODIUM) 1 mg/7.5 mL solution Take 4 mg by mouth 3 (three) times daily as needed for Diarrhea.      mirtazapine (REMERON SOL-TAB) 30 MG disintegrating tablet Take 1 tablet (30 mg total) by mouth nightly. 90 tablet 2    multivitamin (THERAGRAN) per tablet Take 1 tablet by mouth once daily.      nadoloL (CORGARD) 40 MG tablet Take 1 tablet (40 mg total) by mouth once daily. Patient needs appointment before next refill. 90 tablet 7    pantoprazole (PROTONIX) 40 MG tablet Take 1 tablet (40 mg total) by mouth 2 (two) times daily. 60 tablet 12    promethazine (PHENERGAN) 25 MG tablet Take 1 tablet (25 mg total) by mouth every 6 (six) hours as needed for Nausea. 30 tablet 0    tamsulosin (FLOMAX) 0.4 mg Cap Take 1 capsule (0.4 mg total) by mouth once daily. 30 capsule 1    valACYclovir (VALTREX) 500 MG tablet Take 1 tablet (500 mg total) by mouth once daily. 90 tablet 1    vedolizumab (ENTYVIO) 300 mg SolR injection Inject 300 mg into the vein every 8 weeks.      zolpidem (AMBIEN) 10 mg Tab Take 1 tablet (10 mg total) by mouth every evening. 30 tablet 2    [DISCONTINUED] cyclobenzaprine (FLEXERIL) 10 MG tablet Take 1 tablet (10 mg total) by mouth every evening as needed for muscle pain 30 tablet 2    [DISCONTINUED] oxyCODONE (ROXICODONE) 5 MG immediate release tablet Take 1 tablet (5 mg total) by mouth every 6 (six) hours as needed for Pain. 5 tablet 0    [DISCONTINUED] zolpidem (AMBIEN) 10 mg Tab Take 1 tablet (10 mg total) by mouth every evening. 30 tablet 2     Facility-Administered Encounter Medications as of 12/3/2024   Medication Dose Route Frequency Provider Last Rate Last Admin    [DISCONTINUED] 0.9% NaCl  infusion   Intravenous Continuous Peace Garcia MD        [DISCONTINUED] haloperidol lactate injection 0.5 mg  0.5 mg Intravenous Q10 Min PRN Augustin Alexander MD        [DISCONTINUED] heparin, porcine (PF) (heparin flush 100 units/mL) 100 unit/mL injection flush             [DISCONTINUED] propofol (DIPRIVAN) 10 mg/mL infusion   Intravenous PRN Juma Benoit CRNA   Stopped at 12/02/24 1049         PHYSICAL EXAMINATION:    The patient generally appears in good health, is appropriately interactive, and is in no apparent distress.    Skin: No lesions.    Mental: Cooperative with normal affect.    Neuro: Grossly intact.    HEENT: Normal. No evidence of lymphadenopathy.    Chest:  normal inspiratory effort.    Abdomen: Soft, non-tender. No masses or organomegaly. Bladder is not palpable. No evidence of flank discomfort. No evidence of inguinal hernia.    Extremities: No clubbing, cyanosis, or edema    NOTE:  the exam was carried out with a nurse chaperone present  Normal external female genitalia  She has no vaginal atrophy  Urethral meatus is normal  Urethra and bladder are nontender to bimanual exam  Well supported anteriorly and posteriorly   Uterus and cervix are surgically absent  No adnexal masses    LABS:    Lab Results   Component Value Date    BUN 6 10/23/2024    BUN 6 10/23/2024    CREATININE 0.7 10/23/2024    CREATININE 0.7 10/23/2024     10/18/2024 CT scan no stones, no hydro    11/7/2024 MRI enterography images were reviewed. There was no fistula of the bladder.  Nice plane around the bladder with no abscesses or fistula tracks.  Nor is there focal thickening of the bladder.  Images and report were reviewed.      UA 1.030, pH 5, tr leuk,  otherwise, negative.     IMPRESSION:    Recurrent UTI, mechanism appears to be reinfection due to the cultures  Remote history of enterovesical fistula from Crohn's disease    PLAN:    1. To OR for cystoscopy with possible biopsy/fulguration  2. Consent obtained in  clinic by Dr. Villalba, no further questions today  3. Antibiotic plan discussed with patient and Dr. Villalba, will proceed with Ancef due to cardiac history    Darshan Bearden MD  [facility] - PGY4    Patient seen in holding.  No changes in clinical condition.  Proceed with planned procedure.

## 2024-12-11 NOTE — OP NOTE
[facility]  Operative Note    Date: 12/11/2024    Pre-Op Diagnosis:   Recurrent UTIs  History of colovesical fistula repair    Post-Op Diagnosis: Same    Procedure(s) Performed:   1.  Cystoscopy   2.  Cystogram    Specimen(s): None    Staff Surgeon: Lexi Villalba MD    Assistant Surgeon: Darshan Bearden MD    Anesthesia: General mask inhalational anesthesia    Indications: Ivy Salgado is a 44 y.o. female with history of colovesical fistula repair 2/2 Crohn's s/p ileostomy with colectomy. She presents today for cystoscopy with possible biopsy/fulguration and all indicated procedures.    Findings:   Normal urethra, no visible fistulae within bladder  3 small saccules on left lateral wall, presumably from previous colovesical fistula repair with scar tissue  Cystogram with normal contour, no contrast extravasation nor fistula present. Capacity approximately 250 ml under gravity with MAC    Estimated Blood Loss: min    Drains: None    Procedure in Detail:  After risks, benefits and possible complications of cystoscopy were explained, the patient elected to undergo the procedure and informed consent was obtained. All questions were answered in the chance-operative area. The patient was transferred to the cystoscopy suite and placed in the supine position.  SCDs were applied and working.  Anesthesia was administered.  Once the patient was adequately sedated, she was placed in the dorsal lithotomy position and prepped and draped in the usual sterile fashion.  Time out was performed, chance-procedural antibiotics were confirmed.     A 16 Fr flexible cystoscope was introduced into the bladder per urethra. This passed easily.  The entire urethra was visualized which showed no masses or strictures.  The right and left ureteral orifices were identified in the normal anatomic position and were seen effluxing clear urine.  Formal cystoscopy was performed which revealed no masses or lesions suspicious  for malignancy, mild 1+ trabeculations, no bladder stones and no bladder diverticuli. As noted above, there were approximately 3 small saccules along the left lateral bladder wall with scar tissue, presumably from her previous colovesical fistula repair. The flexible cystoscope was removed.     An 18 Fr huffman catheter was placed into the bladder via the urethra in the standard sterile fashion with 10 cc of sterile water inflated into the balloon. A 50 cc catheter-tip syringe was attached and filled via gravity.  The bladder was filled with 250 ml of diluted contrast, at which point it would no longer fill under gravity and MAC sedation.      Fluoroscopy was used intermittently during the cystogram.  This showed a normal bladder contour without extravasation. The bladder was drained and there was no residual contrast on imaging, nor any concern for fistula. Based on this, the huffman was removed after draining the bladder completely. The patient was removed from lithotomy.    The patient tolerated the procedure well and was transferred to recovery in stable condition.    Disposition:  The patient will follow up with Dr. Villalba in 1-2 weeks.    MD MILDRED Danielle was present for the entire case and agree with the above note.

## 2024-12-11 NOTE — ANESTHESIA PREPROCEDURE EVALUATION
12/11/2024  Ivy Salgado is a 44 y.o., female for CYSTOSCOPY, WITH BLADDER BIOPSY, WITH FULGURATION IF INDICATED (N/A)   w/ a significant PMHx of ARPITA/Depression, GERD, Long QTc syndrome (Type III, on nadolol), s/p SHELLIE (2015) for recurrent ovarian cysts and endometriosis, Crohn's disease with small and large intestine involvement, s/p total proctocolectomy with end ileostomy (6/2012).     Port in place for infusions for Crohn's.     Past Medical History:   Diagnosis Date    Abnormal Pap smear 2007    Alkaline phosphatase elevation- based on lab from 4/9/2019 05/28/2019    Arthritis     Avascular necrosis of bone of hip, left     Bacterial vaginosis     Crohn disease     Depression 08/05/2017    Drug-induced pancreatitis (imuran)     Encounter for blood transfusion     Generalized anxiety disorder     Genital HSV     GERD (gastroesophageal reflux disease)     Hypertension     Ileostomy in place 07/09/2012    Kidney stone     Long QT syndrome     Melanoma in situ (excised-buttocks 2018, para-stomal site 2019)     Moderate episode of recurrent major depressive disorder 02/02/2024    Multiple thyroid nodules 11/27/2018    Osteopenia of multiple sites 01/07/2019    Pancreas cyst (tail, 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)     Recurrent Clostridioides difficile diarrhea     Recurrent UTI 04/03/2013     Past Surgical History:   Procedure Laterality Date    ABDOMINAL SURGERY      APPENDECTOMY      ARTHROPLASTY OF SHOULDER Left 7/18/2023    Procedure: ARTHROPLASTY, SHOULDER;  Surgeon: Sharon Babb MD;  Location: [facility] OR;  Service: Orthopedics;  Laterality: Left;    AUGMENTATION OF BREAST Bilateral 06/2022    gel implants    BILATERAL SALPINGO-OOPHORECTOMY (BSO) Bilateral 05/30/2019    Procedure: SALPINGO-OOPHORECTOMY, BILATERAL;  Surgeon: Rupa German MD;  Location: [facility] OR [address] [place] [address];  Service: OB/GYN;  Laterality: Bilateral;     BLADDER SURGERY      partial cystectomy due to fistula    breast lift      BREAST SURGERY      [facility]      COLON SURGERY      COLONOSCOPY      CYSTOSCOPY  09/23/2020    Procedure: CYSTOSCOPY;  Surgeon: Sascha Florentino MD;  Location: [facility] OR 1ST FLR;  Service: Urology;;    CYSTOSCOPY W/ URETERAL STENT PLACEMENT Left 09/15/2020    Procedure: CYSTOSCOPY, WITH URETERAL STENT INSERTION;  Surgeon: Sascha Florentino MD;  Location: [facility] OR 1ST FLR;  Service: Urology;  Laterality: Left;    DIAGNOSTIC LAPAROSCOPY N/A 07/09/2020    Procedure: LAPAROSCOPY, DIAGNOSTIC;  Surgeon: Gilson El MD;  Location: [facility] OR;  Service: OB/GYN;  Laterality: N/A;    ESOPHAGOGASTRODUODENOSCOPY N/A 1/3/2024    Procedure: EGD (ESOPHAGOGASTRODUODENOSCOPY);  Surgeon: Lily Lind MD;  Location: [facility] ENDO (2ND FLR);  Service: Endoscopy;  Laterality: N/A;    EXCISION OF MELANOMA  07/17/2019    ILEOSCOPY N/A 1/3/2024    Procedure: ILEOSCOPY;  Surgeon: Lily Lind MD;  Location: [facility] ENDO (2ND FLR);  Service: Endoscopy;  Laterality: N/A;    ILEOSCOPY N/A 1/24/2024    Procedure: ILEOSCOPY;  Surgeon: Lilian Navarro MD;  Location: [facility] ENDO (2ND FLR);  Service: Endoscopy;  Laterality: N/A;  through stoma    ILEOSCOPY N/A 6/4/2024    Procedure: ILEOSCOPY;  Surgeon: Peace Garcia MD;  Location: [facility] ENDO (4TH FLR);  Service: Endoscopy;  Laterality: N/A;  Ref By:,kaleb sent via [place],  received urgent message to reschedule pt from 7/15  to may or june-updated prep instructions sent-   5/29/24- precall complete - ERW    ILEOSCOPY N/A 12/2/2024    Procedure: ILEOSCOPY;  Surgeon: Peace Garcia MD;  Location: [facility] ENDO (4TH FLR);  Service: Endoscopy;  Laterality: N/A;  Ref By:,lucy,half miralax.  11/22 lvm for precall-st  11/27/24 attempted precall no answer LVM MARI  11/29-LVM for pre call.  No answe.-JM/CANDIDO  11/29-pt lvm confirming appt-KPvt    ILEOSTOMY      INSERTION OF TUNNELED CENTRAL VENOUS  CATHETER (CVC) WITH SUBCUTANEOUS PORT Right 9/10/2024    Procedure: INSERTION, SINGLE LUMEN CATHETER WITH PORT, WITH FLUOROSCOPIC GUIDANCE, Rt neck or chest, prefers chest;  Surgeon: Vinh Lloyd MD;  Location: [facility] OR 2ND FLR;  Service: General;  Laterality: Right;    LAPAROSCOPIC LYSIS OF ADHESIONS N/A 07/09/2020    Procedure: LYSIS, ADHESIONS, LAPAROSCOPIC;  Surgeon: Gilson El MD;  Location: [facility] OR;  Service: OB/GYN;  Laterality: N/A;    LASER LITHOTRIPSY  09/23/2020    Procedure: LITHOTRIPSY, USING LASER;  Surgeon: Sascha Florentino MD;  Location: [facility] OR 1ST FLR;  Service: Urology;;    LYSIS OF ADHESIONS N/A 05/30/2019    Procedure: LYSIS, ADHESIONS;  Surgeon: Rupa German MD;  Location: [facility] OR 2ND FLR;  Service: OB/GYN;  Laterality: N/A;    MEDIPORT REMOVAL Left 9/10/2024    Procedure: REMOVAL, CATHETER, CENTRAL VENOUS, TUNNELED, WITH PORT, Left side;  Surgeon: Vinh Lloyd MD;  [facility];  Service: General;  Laterality: Left;    OOPHORECTOMY Right 04/16/2015    PORTACATH PLACEMENT  02/21/2017    SKIN BIOPSY      SMALL INTESTINE SURGERY      age 18 Y    TOTAL ABDOMINAL HYSTERECTOMY  04/16/2015    TOTAL COLECTOMY      TUBAL LIGATION  06/06/2012    UPPER GASTROINTESTINAL ENDOSCOPY      URETEROSCOPIC REMOVAL OF URETERIC CALCULUS  09/23/2020    Procedure: REMOVAL, CALCULUS, URETER, URETEROSCOPIC;  Surgeon: Sascha Florentino MD;  [facility];  Service: Urology;;    URETEROSCOPY Left 09/23/2020    Procedure: URETEROSCOPY;  Surgeon: Sascha Florentino MD;  [facility];  Service: Urology;  Laterality: Left;     Pre-op Assessment       I have reviewed the Medications.     Review of Systems  Anesthesia Hx:             Denies Family Hx of Anesthesia complications.     Cardiovascular:     Hypertension                                          Renal/:  Chronic Renal Disease                Hepatic/GI:     GERD                Psych:  Psychiatric History                   Physical Exam  General: Well nourished    Airway:  Mallampati: II   TM Distance: Normal  Neck ROM: Normal ROM    Dental:  Intact    Chest/Lungs:  Clear to auscultation    Heart:  Rate: Normal  Rhythm: Regular Rhythm        Anesthesia Plan  Type of Anesthesia, risks & benefits discussed:    Anesthesia Type: Gen ETT, Gen Supraglottic Airway, Gen Natural Airway  Intra-op Monitoring Plan: Standard ASA Monitors  Post Op Pain Control Plan: multimodal analgesia  Induction:  IV  Informed Consent: Informed consent signed with the Patient and all parties understand the risks and agree with anesthesia plan.  All questions answered.   ASA Score: 3    Ready For Surgery From Anesthesia Perspective.     .

## 2024-12-16 NOTE — BRIEF OP NOTE
"Medical Week 1 Survey      Flowsheet Row Responses   [facility] patient discharged from? [facility]   Does the patient have one of the following disease processes/diagnoses(primary or secondary)? Other   Week 1 attempt successful? Yes   Call start time 1752   Call end time 1755   Discharge diagnosis Atrial fib   Person spoke with today (if not patient) and relationship taya Hammond   Meds reviewed with patient/caregiver? Yes   Is the patient having any side effects they believe may be caused by any medication additions or changes? No   Does the patient have all medications ordered at discharge? Yes   Is the patient taking all medications as directed (includes completed medication regime)? Yes   Does the patient have a primary care provider?  Yes   Has the patient kept scheduled appointments due by today? N/A   Comments Daughter is unsure if she make appt but she will make sure she does   Psychosocial issues? No   Did the patient receive a copy of their discharge instructions? Yes   Nursing interventions Reviewed instructions with patient   What is the patient's perception of their health status since discharge? Improving   Is the patient/caregiver able to teach back signs and symptoms related to disease process for when to call PCP? Yes   Is the patient/caregiver able to teach back signs and symptoms related to disease process for when to call 911? Yes   Is the patient/caregiver able to teach back the hierarchy of who to call/visit for symptoms/problems? PCP, Specialist, Home health nurse, Urgent Care, ED, 911 Yes   Additional teach back comments States she is doing \"fair\".   Week 1 call completed? Yes   Wrap up additional comments No questions or needs at this time.   Call end time 1755            Frances MURRAY - Licensed Nurse  " [facility] - [facility] (2nd Fl)  Brief Operative Note    SUMMARY     Surgery Date: 9/10/2024     Surgeons and Role:     * Vinh Lloyd MD - Primary     * Nasrin Wing MD - Resident - Assisting     * Issac Cobos MD - Fellow        Pre-op Diagnosis:  Port-A-Cath in place [Z95.828]    Post-op Diagnosis:  Post-Op Diagnosis Codes:     * Port-A-Cath in place [Z95.828]    Procedure(s) (LRB):  INSERTION, SINGLE LUMEN CATHETER WITH PORT, WITH FLUOROSCOPIC GUIDANCE, Rt neck or chest, prefers chest (Right)  REMOVAL, CATHETER, CENTRAL VENOUS, TUNNELED, WITH PORT, Left side (Left)    Anesthesia: Choice    Implants:  Implant Name Type Inv. Item Serial No.  Lot No. LRB No. Used Action   KIT POWERPORT SINGLE 8FR - [id number]  KIT POWERPORT SINGLE 8FR  [employer] [id number] Right 1 Implanted       Operative Findings: R. Sided subclavian port-a-cath placed and L.sided IJ port-a-cath removed without any apparent complications.    Estimated Blood Loss: * No values recorded between 9/10/2024  6:11 PM and 9/10/2024  7:08 PM *    Estimated Blood Loss has not been documented. EBL = <5cc.         Specimens:   Specimen (24h ago, onward)       Start     Ordered    09/10/24 1836  Specimen to Pathology, Surgery General Surgery  Once        Comments: Pre-op Diagnosis: Port-A-Cath in place [Z95.828]Procedure(s):INSERTION, SINGLE LUMEN CATHETER WITH PORT, WITH FLUOROSCOPIC GUIDANCE, Rt neck or chest, prefers chestREMOVAL, CATHETER, CENTRAL VENOUS, TUNNELED, WITH PORT, Left side Number of specimens: 1Name of specimens: 1. PORT - GROSS     References:    Click here for ordering Quick Tip   Question Answer Comment   Procedure Type: General Surgery    Specimen Class: Routine/Screening    Release to patient Immediate        09/10/24 1836                    [id number]    Nasrin Wing MD  General Surgery, PGY-1  [facility]- Surgery        Continue atorvastatin continue asa and plavix continue asa and plavix continue asa and plavix continue asa and plavix

## 2024-12-18 ENCOUNTER — PATIENT MESSAGE (OUTPATIENT)
Dept: UROLOGY | Facility: CLINIC | Age: 44
End: 2024-12-18
Payer: COMMERCIAL

## 2024-12-20 ENCOUNTER — TELEPHONE (OUTPATIENT)
Dept: GASTROENTEROLOGY | Facility: CLINIC | Age: 44
End: 2024-12-20

## 2024-12-20 ENCOUNTER — OFFICE VISIT (OUTPATIENT)
Dept: GASTROENTEROLOGY | Facility: CLINIC | Age: 44
End: 2024-12-20
Payer: COMMERCIAL

## 2024-12-20 ENCOUNTER — LAB VISIT (OUTPATIENT)
Dept: LAB | Facility: HOSPITAL | Age: 44
End: 2024-12-20
Attending: INTERNAL MEDICINE
Payer: COMMERCIAL

## 2024-12-20 VITALS
SYSTOLIC BLOOD PRESSURE: 101 MMHG | HEIGHT: 61 IN | TEMPERATURE: 98 F | BODY MASS INDEX: 21.72 KG/M2 | HEART RATE: 80 BPM | DIASTOLIC BLOOD PRESSURE: 74 MMHG | WEIGHT: 115.06 LBS | OXYGEN SATURATION: 99 %

## 2024-12-20 DIAGNOSIS — K91.30 SMALL BOWEL ANASTOMOTIC STRICTURE: ICD-10-CM

## 2024-12-20 DIAGNOSIS — T45.1X5A IMMUNODEFICIENCY DUE TO LONG TERM IMMUNOSUPPRESSIVE DRUG THERAPY: ICD-10-CM

## 2024-12-20 DIAGNOSIS — Z79.899 IMMUNODEFICIENCY DUE TO LONG TERM IMMUNOSUPPRESSIVE DRUG THERAPY: ICD-10-CM

## 2024-12-20 DIAGNOSIS — K91.89 SMALL BOWEL ANASTOMOTIC STRICTURE: ICD-10-CM

## 2024-12-20 DIAGNOSIS — Z93.2 ILEOSTOMY IN PLACE: ICD-10-CM

## 2024-12-20 DIAGNOSIS — K50.018 CROHN'S DISEASE OF SMALL INTESTINE WITH OTHER COMPLICATION: Primary | ICD-10-CM

## 2024-12-20 DIAGNOSIS — D84.821 IMMUNODEFICIENCY DUE TO LONG TERM IMMUNOSUPPRESSIVE DRUG THERAPY: ICD-10-CM

## 2024-12-20 DIAGNOSIS — K50.018 CROHN'S DISEASE OF SMALL INTESTINE WITH OTHER COMPLICATION: ICD-10-CM

## 2024-12-20 LAB
25(OH)D3+25(OH)D2 SERPL-MCNC: 41 NG/ML (ref 30–96)
ALBUMIN SERPL BCP-MCNC: 3.9 G/DL (ref 3.5–5.2)
ALP SERPL-CCNC: 163 U/L (ref 40–150)
ALT SERPL W/O P-5'-P-CCNC: 28 U/L (ref 10–44)
ANION GAP SERPL CALC-SCNC: 9 MMOL/L (ref 8–16)
AST SERPL-CCNC: 39 U/L (ref 10–40)
BASOPHILS # BLD AUTO: 0.03 K/UL (ref 0–0.2)
BASOPHILS NFR BLD: 0.5 % (ref 0–1.9)
BILIRUB SERPL-MCNC: 0.4 MG/DL (ref 0.1–1)
BUN SERPL-MCNC: 12 MG/DL (ref 6–20)
CALCIUM SERPL-MCNC: 9.3 MG/DL (ref 8.7–10.5)
CHLORIDE SERPL-SCNC: 108 MMOL/L (ref 95–110)
CHOLEST SERPL-MCNC: 229 MG/DL (ref 120–199)
CHOLEST/HDLC SERPL: 3.5 {RATIO} (ref 2–5)
CO2 SERPL-SCNC: 21 MMOL/L (ref 23–29)
CREAT SERPL-MCNC: 0.9 MG/DL (ref 0.5–1.4)
DIFFERENTIAL METHOD BLD: ABNORMAL
EOSINOPHIL # BLD AUTO: 0.1 K/UL (ref 0–0.5)
EOSINOPHIL NFR BLD: 1.2 % (ref 0–8)
ERYTHROCYTE [DISTWIDTH] IN BLOOD BY AUTOMATED COUNT: 14.5 % (ref 11.5–14.5)
EST. GFR  (NO RACE VARIABLE): >60 ML/MIN/1.73 M^2
GGT SERPL-CCNC: 65 U/L (ref 8–55)
GLUCOSE SERPL-MCNC: 91 MG/DL (ref 70–110)
HAV IGG SER QL IA: REACTIVE
HBV CORE AB SERPL QL IA: NORMAL
HBV SURFACE AG SERPL QL IA: NORMAL
HCT VFR BLD AUTO: 40.8 % (ref 37–48.5)
HDLC SERPL-MCNC: 66 MG/DL (ref 40–75)
HDLC SERPL: 28.8 % (ref 20–50)
HGB BLD-MCNC: 13 G/DL (ref 12–16)
IMM GRANULOCYTES # BLD AUTO: 0.01 K/UL (ref 0–0.04)
IMM GRANULOCYTES NFR BLD AUTO: 0.2 % (ref 0–0.5)
LDLC SERPL CALC-MCNC: 128.2 MG/DL (ref 63–159)
LYMPHOCYTES # BLD AUTO: 1.7 K/UL (ref 1–4.8)
LYMPHOCYTES NFR BLD: 29.6 % (ref 18–48)
MCH RBC QN AUTO: 27.9 PG (ref 27–31)
MCHC RBC AUTO-ENTMCNC: 31.9 G/DL (ref 32–36)
MCV RBC AUTO: 88 FL (ref 82–98)
MONOCYTES # BLD AUTO: 0.5 K/UL (ref 0.3–1)
MONOCYTES NFR BLD: 8.2 % (ref 4–15)
NEUTROPHILS # BLD AUTO: 3.4 K/UL (ref 1.8–7.7)
NEUTROPHILS NFR BLD: 60.3 % (ref 38–73)
NONHDLC SERPL-MCNC: 163 MG/DL
NRBC BLD-RTO: 0 /100 WBC
PLATELET # BLD AUTO: 310 K/UL (ref 150–450)
PMV BLD AUTO: 10 FL (ref 9.2–12.9)
POTASSIUM SERPL-SCNC: 4.3 MMOL/L (ref 3.5–5.1)
PROT SERPL-MCNC: 8.3 G/DL (ref 6–8.4)
RBC # BLD AUTO: 4.66 M/UL (ref 4–5.4)
SODIUM SERPL-SCNC: 138 MMOL/L (ref 136–145)
TRIGL SERPL-MCNC: 174 MG/DL (ref 30–150)
VIT B12 SERPL-MCNC: 267 PG/ML (ref 210–950)
WBC # BLD AUTO: 5.7 K/UL (ref 3.9–12.7)

## 2024-12-20 PROCEDURE — 87340 HEPATITIS B SURFACE AG IA: CPT | Performed by: INTERNAL MEDICINE

## 2024-12-20 PROCEDURE — 36415 COLL VENOUS BLD VENIPUNCTURE: CPT | Performed by: INTERNAL MEDICINE

## 2024-12-20 PROCEDURE — 82977 ASSAY OF GGT: CPT | Performed by: INTERNAL MEDICINE

## 2024-12-20 PROCEDURE — 86480 TB TEST CELL IMMUN MEASURE: CPT | Performed by: INTERNAL MEDICINE

## 2024-12-20 PROCEDURE — 80061 LIPID PANEL: CPT | Performed by: INTERNAL MEDICINE

## 2024-12-20 PROCEDURE — 80053 COMPREHEN METABOLIC PANEL: CPT | Performed by: INTERNAL MEDICINE

## 2024-12-20 PROCEDURE — 82306 VITAMIN D 25 HYDROXY: CPT | Performed by: INTERNAL MEDICINE

## 2024-12-20 PROCEDURE — 86704 HEP B CORE ANTIBODY TOTAL: CPT | Performed by: INTERNAL MEDICINE

## 2024-12-20 PROCEDURE — 85025 COMPLETE CBC W/AUTO DIFF WBC: CPT | Performed by: INTERNAL MEDICINE

## 2024-12-20 PROCEDURE — 82607 VITAMIN B-12: CPT | Performed by: INTERNAL MEDICINE

## 2024-12-20 PROCEDURE — 86790 VIRUS ANTIBODY NOS: CPT | Performed by: INTERNAL MEDICINE

## 2024-12-20 NOTE — PROGRESS NOTES
[facility]             Inflammatory Bowel Disease   Follow-up  Note              TODAY'S VISIT DATE:  12/20/2024    Chief Complaint:   Chief Complaint   Patient presents with    Crohn's Disease     PCP: Luis Madden    Previous History:  Ivy Salgado is a 44 y.o. female with Crohn's disease with end ileostomy and recurrent ileitis, recurrent C diff (2014 x 2), ARPITA/depression, arthritis, h/o abnormal pap smear- LYLA I, nephrolithiasis-nonobstructive, GERD, long QTc syndrome (Type III, on nadolol), s/p SHELLIE (2015) for recurrent ovarian cysts and endometriosis, early evolving melanoma in situ (buttocks and para-stomal site, excised in 2018 and 2019 respectively), history of genital HSV, imuran induced pancreatitis, pancreatic tail cyst, recurrent UTI (h/o ESBL 2018), multiple thyroid nodules, osteopenia, left femoral head chronic avascular necrosis, history bacterial vaginosis, h/o abnormal LFTs (elevated ALP since 2016), family history of colon cancer.  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  She tried multiple meds including imuran (pancreatitis, remicade (secondary nonresponder), humira (DIL), MTX (leukopenia).  She met Dr. Cameron initially in GI clinic at [facility] 5/2009 at which time she had some diarrhea and on pred 30 mg/d. Colonoscopy 6/2009 significant for diffuse proctosigmoiditis with remainder of colon normal and end to end ileocolonic anastomsis with inflammation. Placed on rowasa enemas but too expensive so switched to steroid enemas.  Flex sig 10/2009 ongoing proctosigmoiditis.  In 2010 she had rectal bleeding that improved with proctofoam and started cimzia with improvement of symptoms.  In 4/2010 she had CT showing circumferential bowel wall thickening of the distal descending and sigmoid with small caliber origin of celiac  artery and referred to vascular for median arcuate ligament syndrome.  Colonoscopy c/w moderate proctosigmoiditis and in 5/2010 she continued proctofoam BID and took extra dose of cimzia to induce remission.  In 7/2010 flex sig confirmed active left sided colitis and she started prednisone in 3/2011 with repeat colonoscopy 8/2011 c/w ongoing rectosigmoid inflammation with mild mucosal ulcer the transverse colon with ileocolonic anastomotic stricture and normal TI. In 9/2011 pt was on canasa, cimzia and prednisone taper.  In April/May 2012 pt hospitalized and CT c/w sigmoid wall thickening and colonoscopy confirmed distal 30 cm with moderate inflammation with ileocolonic anastomotic ulcers and normal TI.  In 6/2012 Dr Parsons proceeded with completion proctocolectomy with end ileostomy with pathology confirming transmural active inflammation with abscess c/w Crohn's disease.  Post-operatively she had peristomal ulcer and perineal wounds. In 1/2013 patient had non-healing perineal wound S/P debridement  with steroids and treated with cipro. In 8/2013 she had EUA with debridement and fulguration of non-healing perineal wound.  In 8/2013 pt had epigastric abd pain and EGD c/w benign gastric polyp, labs normal, CT c/w short segment WB thickening involving ileum proximal to the stoma and resolution of right adnexal mass.  In 9/2013 ileoscopy through stoma significant for segment mucosa at 5 cm proximal to stoma mild congested, eroded, ulcerated area of 6-30 cm and mucosal distal is normal. CT A/p 1/2024 c/w 2 separate segments of SB proximal to the ostomy and near staple row in RLQ with mild enhancement and wall thickening c/w mild inflammatory changes and segment of bowel forming ostomy.  SB enteroscopy 2/2014 significant for single 2 mm ulcer in the proximal ileum.  In 2014 she had 2 hospitalizations for high ileostomy output with was treated with prednisone with taper.  In 10/2014 CT showed few mild dilated loops SB in  anterior right pelvis and loop with surgical suture line abuts anterior pelvic wall and possible adhesion. In 10/2014 ileoscopy through stomal normal.  In 1/2015 pt had focal segment of mild distal SB dilation and C diff positive and pt treated antibiotics followed by probiotics and resumed cimzia. Dr. Cameron then referred patient to Dr. Parish Ross in 2015 and he mentions recurrent C diff, recurrent SBOs likely related to adhesions. In 2015 she had SHELLIE/BSO with bladder repair and due to this missed one dose of cimzia and then resumed 5/2015. In 1/2016 she had partial SBO due to adhesions and UGI/SBFT and CT did not show fixed obstruction. In 10/2016 she reported to Dr. Ross postprandial nausea and epigastric pain, weight loss, fatigue, low grade fever and watery stool output and prednisone helped symptoms but not as good response as past. She was off of cimzia 8-9/2016 though sx occurred prior to holding cimzia. Ileoscopy through stoma 10/2016 significant for normal 15 cm of ileum examined. Overall Dr. Ross felt that her symptoms responded well to prednisone and restarting cimzia. She presented to her OV 1/2017 with increased ileostomy output with C diff negative with symptoms ongoing and MRE 5/2017 c/w long segment of diffuse wall thickening and mucosal enhancement involving the distal ileum. CT A/P 6/2017 significant for left ovarian cyst, mild biliary dilation stable, hypodensities and punctate bilateral renal calcifications.  Ileoscopy through stoma 8/2017 c/w normal ileum from stoma to 15-20 cm. CT A/P 11/2017 significant for righ adnexal mass abutting theright external iliac vein and pelvic US recommended, bilateral renal hypodensities and calcifications. MRE 11/2017 c/w mild questionable ileal inflammation and luminal narrowing involving short segment of SB within the right hemipelvis with mild dilation and upstream bowel 2.5 cm with findings concerning for developing stricture. She stopped cimzia 6/2017  and started stelara 7/11/2017 though discontinued in 1/2018 due to rash.  Due to symptoms pt was started in 1/2018 on prednisone 10 mg/d, bentyl. In 2/2018 CT A/P c/w bilateral nonobstructing renal calculi, mild bilateral cortical scarring of lower renal poles. MRE 9/2018 significant for short segment of distal ileum with scattered regions of non uniform wall thickening and possible additional short segment of proximal jejunum with mild wall thickening. In fall 2018 she was placed on entocort though due to fast transit was opening capsule and taking this. CT A/P 12/2018 c/w several bilateral renal hypodensities c/w cysts, nonobstructive bilateral nephrolithiasis, right adnexal complex cyst. She started entyvio 11/20/18 and due to recurrent UTIs and palpitations (dx QT prolongation and started on beta blocker) so discontinued in 10/27/20. MRE 5/15/19 significant for right adnexal cystic lesion, large left adnexal cystic lesion.  On 5/30/19 patient had exploratory laparotomy with lysis of adhesions, BSO/mass resection. CT A/P 6/2019 significant for nonspecific rim enhancing fluid collection, mild left hydroureteronephrosis. In 2019 there were several mos she held entyvio due to gyn and melanoma surgery. MRE 5/2020 significant for small non enhancing fluid collection within presacral region superior to the vaginal cuff, right sided pelvocaliectasis, bilateral cortical renal cysts, left femoral head chronic avascular necrosis. CT A/P 9/2020 c/w mild left hydrouriteronephrosis due to distal left ureter stone with ass urothelial thickening and enhancement.  MRE 4/2021 right ovarian cyst likely hemorrhagic cyst.  In 1/2022 patient was placed by Dr. Ross on pantoprazole 40 mg BID. For joint pains and chronic abdominal pain Dr. Ross treated her with gabapentin for several years. In 1/2023 she had multiple intrarenal stones.  Repeat CT 10/2023 small non-obstructing renal stones. She was hospitalized 12/31/23-1/7/24 for high  ileostomy output with stool studies neg for infection.  Elevated inflammatory markers (ESR 94, CRP 22.8), stool caprotectin 281. CT A/P showed slow flow through few mid to distal SB loops noting venous vascular engorgement about these loops and wall hyperemia. On 1/3/24 she had EGD c/w mild HP neg gastritis and ileoscopy through stoma with about 6 apthous ulcers in the distal 20 cm of the ileum and biopsies c/w active inflammation.  Pt had elevated LFTs (peaked 1/4/24 ///TB normal).  Hepatology consulted and serological workup negative and subsequent LFTs normalized with no intervention. Abdominal US revealed mildly dilated bile ducts in the central right hepatic lobe and MRCP c/w no evidence of biliary obstruction, punctate cystic focus in the pancreas tail likely side IPMN and f/u in 1 year recommended.  She was treated with antidiarrheals (imodium helped but lomotil caused palpitations) and IVFs. Lotronex was being considered but due to prolonged QT unable to proceed with this. Due to mild ileitis plan for outpatient entyvio.  Since her discharge from hospital she had continued high ileostomy output and dehydration and we recommended that she return for hospitalization but pt did not wish to go to ER.  She continued with high ileostomy output up to 8 liters/day but if fasting down to 4 liters/day despiate taking imodium 1 tab QID.  She is not taking lomotil due to palpitations.  At her OV 1/16/24 due to high ileostomy output and dehydration I proceeded with hospital admission 1/16/24-1/27/24 at which time she continued on IVFs, antidiarrheals, pain meds and started on IV solumedrol with repeat stool calprotectin done on 1/16/24 93.9 though unclear accuracy given some granulation tissue on stoma could influence this test. On 1/24/24 stool calprotectin 117.6, ileoscopy through stoma with one small apthous ulcer 19 cm proximal to stoma. She had stool studies neg for infection and then discharged  home on liquid imodium, pred 40 mg with taper and plan to start entyvio. She restarted entyvio with the re induction doses on 1/30/2024 then resumed every 8 weeks infusions and by her office visit in 3/2024 patient no longer needed IVFs and was recovering well from the hospital stay. She reported recurrent UTIs with entyvio in the past and was advised to closely monitor her symptoms and since then has had recurrent UTI but overall I fel this was highly unusual given MOA of entyvio.  In 4/2024 pt hospitalized for abd pain and decreased ileostomy output and CT A/P significant for SBO with transition point seen in the midline upper pelvis. The small bowel anastomosis and also ileostomy site are patent and do not appear to be source for suspected obstruction. This was managed conservatively and followed by MRE 5/2024 significant for short segmented stricture with mild active inflammation adjacent to ehr RLQ surgical anastomosis with tethering and distortion. Ileoscopy through stoma 6/4/24 significant of neoterminal ileum immediately proximal to the stoma normal.     Interval History:  - current IBD meds:  Entyvio 300 mg IV q 8 weeks (11/20/18- 10/27/20- stopped due to UTIs and palpitations, reinduced 1/30/24, LD 12/18, ND 2/12)  - other medications: liquid imodium PRN; phenergan PRN for nausea ~2x/week  - recent medications: Keflex course for recent UTI;  methenamine/ sodium hippurate used for 2 weeks for UTI prevention but made her nauseated so she self d/c  - ileostomy output: 3-4 full bags, variable consistency liquid to soft; no abdominal pain  - 10/2024- hospitalization for SBO with CT A/P significant for multiple fluid-air filled loops of dilated small bowel in the right hemiabdomen and pelvis with suspected narrowing at a surgical anastomosis.  No definite transition point elsewhere.  Findings may represent small bowel obstruction.  A 4 cm segment of the right lower quadrant small bowel demonstrates findings  suspicious for active segmental enteritis and/or small bowel stricture. This was managed conservatively with resolution.  - 24 MRE:  Several small renal cysts noted.  RLQ chance-anastomotic SB with thickening, transmural hyperenhancement with minimal restricted diffusion involving 4.3 cm of bowel, outpouching at site of SB anastomosis.  - 24 ileoscopy through stoma: There was evidence of a patent end ileostomy found in the terminal ileum. This was characterized by healthy appearing mucosa. he harvey-terminal ileum appeared normal. Biopsies ileum normal  - 24 MRI abdomen: Stable punctate cystic focus in the pancreatic tail with probable communication with the main pancreatic duct likely representing a side branch IPMN. Too small to characterize without suspicious features. Continued follow-up in 1 year with MRI MRCP with and without contrast  - 24 cystoscopy:  Normal urethra, no visible fistulae within bladder, 3 small saccules on left lateral wall, presumably from previous colovesical fistula repair with scar tissue, cystogram with normal contour, no contrast extravasation nor fistula present.   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       Last pertinent Endoscopy/Imagin23 CT A/P slow flow through few mid to distal SB loops noting venous vascular engorgement about these loops and wall hyperemia  1/3/24 EGD: normal except for gastric erythema, bx c/w mild HP neg gastritis   1/3/24 ileoscopy through stoma:  6 apthous ulcers in the  distal 20 cm of the ileum and biopsies c/w active inflammation  1/3/24 abd US:  mildly dilated bile ducts in the central right hepatic lobe   1/4/24 MRCP c/w no evidence of biliary obstruction, punctate cystic focus in the pancreas tail likely side IPMN  5/17/24 MRE: short-segmented stricture with signs of mild active inflammation adjacent to the RLQ surgical anastomosis with tethering and distortion makes an underlying fistula difficult to exclude.   6/4/24 Ileoscopy: Patent end ileostomy with healthy appearing mucosa in the terminal ileum. The examined portion of the ileum was normal. Biopsies normal  10/2024:  CT A/P significant for multiple fluid-air filled loops of dilated small bowel in the right hemiabdomen and pelvis with suspected narrowing at a surgical anastomosis.  No definite transition point elsewhere.  Findings may represent small bowel obstruction.  A 4 cm segment of the right lower quadrant small bowel demonstrates findings suspicious for active segmental enteritis and/or small bowel stricture. This was managed conservatively with resolution.  11/7/24 MRE:  Several small renal cysts noted.  RLQ chance-anastomotic SB with thickening, transmural hyperenhancement with minimal restricted diffusion involving 4.3 cm of bowel, outpouching at site of SB anastomosis.  12/4/24 MRI abdomen: Stable punctate cystic focus in the pancreatic tail with probable communication with the main pancreatic duct likely representing a side branch IPMN. Too small to characterize without suspicious features. Continued follow-up in 1 year with MRI MRCP with and without contrast  2/11/24 cystoscopy:  Normal urethra, no visible fistulae within bladder, 3 small saccules on left lateral wall, presumably from previous colovesical fistula repair with scar tissue, cystogram with normal contour, no contrast extravasation nor fistula present.      Therapeutic Drug Monitoring Labs:  None     Prior IBD Therapies:  Proctofoam - partially  effective  imuran (pancreatitis)  MTX (leukopenia)  Remicade- secondary nonresponder  Humira- discontinued due to drug induced lupus due to joint pains  Entocort- fall 2018, broke open capsule when taking- not sure if effective   Cimzia [id number]- secondary nonresponder  Stelara (7/11/17-1/2018)- discontinued due to rash  Canasa ineffective   Prednisone- effective- last took 1-2/2024    Vaccinations:  Lab Results   Component Value Date    HEPBSAB >1000.00 01/02/2024    HEPBSAB Reactive 01/02/2024     Lab Results   Component Value Date    HEPAIGG Non-reactive 01/02/2024     Lab Results   Component Value Date    VARICELLAZOS 883.00 01/02/2024    VARICELLAINT Positive 01/02/2024     Lab Results   Component Value Date    MUMPSIGGSCRE 26.30 01/02/2024    MUMPSIGGINTE Positive 01/02/2024      Lab Results   Component Value Date    RUBEOLAIGGAN 35.60 01/02/2024    RUBEOLAINTER Positive 01/02/2024     Immunization History   Administered Date(s) Administered    COVID-19, MRNA, LN-S, PF (Pfizer) (Purple Cap) 12/21/2020, 01/13/2021, 09/07/2021    Hepatitis A, Adult 02/12/2024, 08/27/2024    Hepatitis B (recombinant) Adjuvanted, 2 dose 12/26/2019, 01/23/2020    Influenza 09/18/2013, 10/25/2022    Influenza - Quadrivalent - PF *Preferred* (6 months and older) 09/18/2013, 10/04/2016, 11/17/2017, 09/13/2018, 10/02/2019, 09/10/2020, 09/24/2021, 10/25/2022, 10/09/2023    Influenza - Trivalent - Fluarix, Flulaval, Fluzone, Afluria - PF 11/11/2024    Influenza Split 09/18/2013    Pneumococcal Conjugate - 13 Valent 06/28/2017    Pneumococcal Conjugate - 20 Valent 01/30/2024    Pneumococcal Polysaccharide - 23 Valent 08/19/2015    Td (ADULT) 06/28/2013    Tdap 12/29/2019   Flu shot: recommended yearly, UTD   COVID vaccine/booster:  per CDC recommendations  RSV:  after age 51 yo  Tetanus (Tdap):  due 12/2029  HPV: declined  Meningococcal: NA  Hepatitis A:  HAV IgG today  Shingrix: #1 today, #2 at later date    Review of Systems    Constitutional:  Negative for chills, fever and weight loss.   HENT:          No oral ulcers, dysphagia, oral thrush   Eyes:  Negative for blurred vision, pain and redness.   Respiratory:  Positive for cough. Negative for shortness of breath.    Cardiovascular:  Negative for chest pain.   Gastrointestinal:  Positive for abdominal pain, heartburn and nausea. Negative for vomiting.   Genitourinary:  Negative for dysuria and hematuria.   Musculoskeletal:  Positive for back pain. Negative for joint pain.   Skin:  Negative for rash.   Psychiatric/Behavioral:  Negative for depression. The patient is nervous/anxious. The patient does not have insomnia.      All Medical History/Surgical History/Family History/Social History/Allergies have been reviewed and updated in EMR    Outpatient Medications Marked as Taking for the 12/20/24 encounter (Office Visit) with Peace Garcia MD   Medication Sig Dispense Refill    clonazePAM (KLONOPIN) 1 MG tablet Take 1 tablet (1 mg total) by mouth 2 (two) times daily. 60 tablet 2    cyclobenzaprine (FLEXERIL) 10 MG tablet Take 1 tablet (10 mg total) by mouth every evening as needed for muscle pain 30 tablet 2    droNABinol (MARINOL) 10 MG capsule Take 1 capsule (10 mg total) by mouth once daily. 90 capsule 2    estradiol 0.025 mg/24 hr 0.025 mg/24 hr Place 1 patch onto the skin once a week. CHANGES ON MONDAYS 4 patch 11    gabapentin (NEURONTIN) 300 MG capsule Take 1 capsule (300 mg total) by mouth 2 (two) times daily. 60 capsule 4    loperamide (IMODIUM) 1 mg/7.5 mL solution Take 4 mg by mouth 3 (three) times daily as needed for Diarrhea.      mirtazapine (REMERON SOL-TAB) 30 MG disintegrating tablet Take 1 tablet (30 mg total) by mouth nightly. 90 tablet 2    multivitamin (THERAGRAN) per tablet Take 1 tablet by mouth once daily.      nadoloL (CORGARD) 40 MG tablet Take 1 tablet (40 mg total) by mouth once daily. Patient needs appointment before next refill. 90 tablet 7     "pantoprazole (PROTONIX) 40 MG tablet Take 1 tablet (40 mg total) by mouth 2 (two) times daily. 60 tablet 12    promethazine (PHENERGAN) 25 MG tablet Take 1 tablet (25 mg total) by mouth every 6 (six) hours as needed for Nausea. 30 tablet 0    tamsulosin (FLOMAX) 0.4 mg Cap Take 1 capsule (0.4 mg total) by mouth once daily. 30 capsule 1    valACYclovir (VALTREX) 500 MG tablet Take 1 tablet (500 mg total) by mouth once daily. 90 tablet 1    vedolizumab (ENTYVIO) 300 mg SolR injection Inject 300 mg into the vein every 8 weeks.      zolpidem (AMBIEN) 10 mg Tab Take 1 tablet (10 mg total) by mouth every evening. 30 tablet 2     Vital Signs:  /74 (BP Location: Left arm, Patient Position: Sitting)   Pulse 80   Temp 97.9 °F (36.6 °C) (Temporal)   Ht 5' 1" (1.549 m)   Wt 52.2 kg (115 lb 1.3 oz)   LMP 03/30/2015   SpO2 99%   BMI 21.74 kg/m²   Physical Exam  Cardiovascular:      Rate and Rhythm: Normal rate and regular rhythm.   Pulmonary:      Effort: Pulmonary effort is normal.      Breath sounds: Normal breath sounds.   Abdominal:      General: Bowel sounds are normal. There is no distension.      Palpations: Abdomen is soft.      Tenderness: There is no abdominal tenderness.   Musculoskeletal:      Right lower leg: No edema.      Left lower leg: No edema.     Labs:  Lab Results   Component Value Date    CRP 0.9 10/23/2024    CALPROTECTIN 117.6 (H) 01/24/2024     Lab Results   Component Value Date    HEPBSAG Non-reactive 03/04/2024    HEPBCAB Non-reactive 01/02/2024     Lab Results   Component Value Date    TBGOLDPLUS Negative 01/02/2024     Lab Results   Component Value Date    TEVSTEWE97IF 21 (L) 06/13/2019    EOGSHTQN26 278 01/02/2024     Lab Results   Component Value Date    WBC 5.73 10/23/2024    HGB 12.3 10/23/2024    HCT 38.0 10/23/2024    MCV 87 10/23/2024     10/23/2024     Lab Results   Component Value Date    CREATININE 0.7 10/23/2024    CREATININE 0.7 10/23/2024    ALBUMIN 3.6 10/18/2024    " BILITOT 0.3 10/18/2024    ALKPHOS 150 10/18/2024    AST 48 (H) 10/18/2024    ALT 32 10/18/2024     Assessment/Plan:  Ivy Salgado is a 44 y.o.  with Crohn's disease with end ileostomy and recurrent ileitis, recurrent C diff (2014 x 2), ARPITA/depression, arthritis, h/o abnormal pap smear- LYLA I, nephrolithiasis-nonobstructive, GERD, long QTc syndrome (Type III, on nadolol), s/p SHELLIE (2015) for recurrent ovarian cysts and endometriosis, early melanoma in situ (buttocks and para-stomal site, excised in 2018 and 2019 respectively), history of genital HSV, imuran induced pancreatitis, pancreatic tail cyst, recurrent UTI (h/o ESBL 2018), multiple thyroid nodules, osteopenia, history bacterial vaginosis, h/o abnormal LFTs (elevated ALP since 2016), family history of colon cancer.      Patient has had 3 SBOs managed conservatively this year and the mild ileitis that she had that was accessible by scope through stoma has resolved on entyvio.  SB imaging reviewed seems to indicate a SB/SB anastomotic stricture (confirmed she has SB-SB anastomosis from 1992 surgery per her mom) in RLQ that is not accessible by scope.  I am concerned that this will likely need surgical intervention.  I do not think the entyvio is the cause of her recurrent UTIs.      # Crohn's disease with end ileostomy and recurrent ileitis:  - high ileostomy output- resolved, no longer requiring IVFs, continues liquid imodium as needed  - note melanoma, genital HSV- on valtrex prophylaxis, h/o endometriosis, recurrent UTIs  - continue entyvio q 8 weeks   - ileoscopy through stoma- repeat timing to be determined  - repeat stool calprotectin now  - drug monitoring labs: CBC/CMP q 6 mos (today), TPMT (normal metabolizer 1/2024), TB quantiferon (today), Hep B testing (today)    # Recurrent UTI  - in past entyvio discontinued due to UTIs; 4 episodes of UTI in last 5 months  - pt understands risk vs. benefits of continuing entyvio, overall I do not believe this is  related to the entyvio  - recent cystoscopy did not show fistula   - did not tolerate hiprex as recommended per ID  - continue to follow up with ID and urology     # Elevated liver tests (AST/ALT, ALP)  - AST/ALT now resolved with serologic w/u 9/2023 acute hep panel neg, 11/2023 HBsAg, HBcAb/SAURAV/AMA/ASMA neg, iron studies neg; 1/2024 normal ceruloplasmin A1AT normal  - due to elevated ALP (since 2019) MRCP done with no findings of PSC though pt has family hx PSC    # Pancreatic tail cyst (CT 12/2023, MRI 12/2024)  - seen in pancreas cyst clinic- MRCP 12/2025    # 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 Simon- reminded to make f/u appt   - skin exam yearly- pt has appt 1/2025    # Bone health:  - left hip avascular necrosis on past CT- will see ortho  - osteopenia- has kidney stones so calcium supplements would defer or consult with renal/urology  - vit D deficiency- recommend 5000 IU 2 tabs daily - vit D today    # Immunodeficiency due to long term immunosuppressive drug therapy and IBD specific health maintenance:  CRC risk- no risk, TAC  Pap smear- SHELLIE with BSO, defer to GYN  Vaccines- no live vaccines, HAV IgG today, shingrix #1 today    Total time: 45 min.     Follow up in about 3 months (around 3/20/2025) for in person.    Peace Garcia MD  Department of Gastroenterology  Medical Director, Inflammatory Bowel Disease

## 2024-12-20 NOTE — PATIENT INSTRUCTIONS
- will continue entyvio  - multidisciplinary IBD conference 1/15/24 and will discuss your case  - will review imaging with radiologist in early Jan prior to the conference  - skin exam yearly  - pap smear yearly  - labs today- all labs under my name  - shingrix #1 today  - f/u on a Friday so can do 2nd shingrix   - start vit D liquid and try to take 10,000 IU daily for 3 mos and then 5000 IU/d

## 2024-12-21 LAB
GAMMA INTERFERON BACKGROUND BLD IA-ACNC: 0.01 IU/ML
M TB IFN-G CD4+ BCKGRND COR BLD-ACNC: -0 IU/ML
M TB IFN-G CD4+ BCKGRND COR BLD-ACNC: 0.02 IU/ML
MITOGEN IGNF BCKGRD COR BLD-ACNC: -0 IU/ML
TB GOLD PLUS: ABNORMAL

## 2024-12-23 ENCOUNTER — TELEPHONE (OUTPATIENT)
Dept: GASTROENTEROLOGY | Facility: CLINIC | Age: 44
End: 2024-12-23
Payer: COMMERCIAL

## 2024-12-23 ENCOUNTER — OFFICE VISIT (OUTPATIENT)
Dept: PRIMARY CARE CLINIC | Facility: CLINIC | Age: 44
End: 2024-12-23
Payer: COMMERCIAL

## 2024-12-23 ENCOUNTER — PATIENT MESSAGE (OUTPATIENT)
Dept: GASTROENTEROLOGY | Facility: HOSPITAL | Age: 44
End: 2024-12-23
Payer: COMMERCIAL

## 2024-12-23 ENCOUNTER — LAB VISIT (OUTPATIENT)
Dept: LAB | Facility: HOSPITAL | Age: 44
End: 2024-12-23
Attending: INTERNAL MEDICINE
Payer: COMMERCIAL

## 2024-12-23 ENCOUNTER — PATIENT MESSAGE (OUTPATIENT)
Dept: GASTROENTEROLOGY | Facility: CLINIC | Age: 44
End: 2024-12-23
Payer: COMMERCIAL

## 2024-12-23 VITALS
HEIGHT: 61 IN | RESPIRATION RATE: 18 BRPM | DIASTOLIC BLOOD PRESSURE: 104 MMHG | SYSTOLIC BLOOD PRESSURE: 145 MMHG | OXYGEN SATURATION: 99 % | BODY MASS INDEX: 22.68 KG/M2 | TEMPERATURE: 98 F | WEIGHT: 120.13 LBS | HEART RATE: 61 BPM

## 2024-12-23 DIAGNOSIS — T45.1X5A IMMUNODEFICIENCY DUE TO LONG TERM IMMUNOSUPPRESSIVE DRUG THERAPY: ICD-10-CM

## 2024-12-23 DIAGNOSIS — Z79.899 IMMUNODEFICIENCY DUE TO LONG TERM IMMUNOSUPPRESSIVE DRUG THERAPY: ICD-10-CM

## 2024-12-23 DIAGNOSIS — D84.821 IMMUNOSUPPRESSION DUE TO DRUG THERAPY: ICD-10-CM

## 2024-12-23 DIAGNOSIS — K50.018 CROHN'S DISEASE OF SMALL INTESTINE WITH OTHER COMPLICATION: Primary | ICD-10-CM

## 2024-12-23 DIAGNOSIS — Z79.899 IMMUNOSUPPRESSION DUE TO DRUG THERAPY: ICD-10-CM

## 2024-12-23 DIAGNOSIS — D84.821 IMMUNODEFICIENCY DUE TO LONG TERM IMMUNOSUPPRESSIVE DRUG THERAPY: ICD-10-CM

## 2024-12-23 DIAGNOSIS — J06.9 UPPER RESPIRATORY TRACT INFECTION, UNSPECIFIED TYPE: Primary | ICD-10-CM

## 2024-12-23 PROBLEM — A04.71 RECURRENT CLOSTRIDIOIDES DIFFICILE DIARRHEA: Status: RESOLVED | Noted: 2024-02-10 | Resolved: 2024-12-23

## 2024-12-23 PROCEDURE — 99499 UNLISTED E&M SERVICE: CPT | Mod: S$GLB,,, | Performed by: INTERNAL MEDICINE

## 2024-12-23 PROCEDURE — 83993 ASSAY FOR CALPROTECTIN FECAL: CPT | Performed by: INTERNAL MEDICINE

## 2024-12-23 NOTE — PATIENT INSTRUCTIONS
Thank you for visiting today.  Your COVID and flu testing were negative.  There were no findings either by history or exam of a bacterial illness and therefore antibiotics would not be helpful.    The following are my recommendations and these should be safe for you in terms of your prolonged QT interval   Zyrtec 10 mg at bedtime  Mucinex DM 1/2-1 tablet twice daily.  I have checked the medications in this preparation and they do not prolonged QT interval.  Saline nasal spray 4 squirts each nostril every 2 hours  Honey 1-2 tbsp directly off the spoon as often as desired

## 2024-12-23 NOTE — PROGRESS NOTES
44-year-old woman with a history of depression and anxiety, disease of the heart (hypertension, history of prolonged QT interval), history of nephrolithiasis, history of HSV, thyroid nodules, history of Crohn's disease status post ileostomy, here with a complaint of a URI.      History of present illness and pertinent review of systems:  The patient has a 1 day history of a cough.  Friday the patient notes she had the shingles vaccine in initially thought she was having side effects from that.  On Sunday however she developed a cough.  The patient had no fever or sweats but she did feel chilled.  She has a frontal headache.  She has no earache but her ears feel full.  She has nasal congestion, rhinorrhea, and postnasal drip.  The patient notes that her sense of taste and smell are diminished.  She has no dental pain or facial swelling.  She has a sore scratchy throat.  She has no swollen glands.  She has a nonproductive cough.  There is no wheeze, pleurisy, or hemoptysis.  She does feel short of breath.  There was no increased output from her ileostomy.  There has been no rash or myalgia.  She does note fatigue.  The patient has tried ibuprofen and Tylenol without relief.   She is not aware of any issue with second-generation antibiotics in his not been warned not to take them    Problem list, medication list, and allergies have been reviewed.    Review of systems is otherwise negative.    Physical Exam  Vitals and nursing note reviewed.   Constitutional:       General: She is not in acute distress.     Appearance: She is normal weight. She is not ill-appearing, toxic-appearing or diaphoretic.   HENT:      Head: Atraumatic.      Right Ear: Tympanic membrane, ear canal and external ear normal.      Left Ear: Tympanic membrane, ear canal and external ear normal.      Nose: Congestion and rhinorrhea present.      Mouth/Throat:      Mouth: Mucous membranes are moist.      Pharynx: Oropharynx is clear. Posterior  oropharyngeal erythema present. No oropharyngeal exudate.   Eyes:      General: No scleral icterus.     Conjunctiva/sclera: Conjunctivae normal.   Neck:      Vascular: No carotid bruit.   Cardiovascular:      Rate and Rhythm: Normal rate and regular rhythm.      Pulses: Normal pulses.      Heart sounds: Normal heart sounds. No murmur heard.     No gallop.   Pulmonary:      Effort: Pulmonary effort is normal. No respiratory distress.      Breath sounds: Normal breath sounds. No wheezing, rhonchi or rales.   Abdominal:      General: Bowel sounds are normal.      Palpations: Abdomen is soft.      Tenderness: There is no abdominal tenderness.      Comments: No hepatosplenomegaly   Musculoskeletal:         General: No tenderness.      Cervical back: Neck supple. No tenderness.      Right lower leg: No edema.      Left lower leg: No edema.   Lymphadenopathy:      Cervical: No cervical adenopathy.   Skin:     General: Skin is warm.      Findings: No rash.   Neurological:      General: No focal deficit present.      Mental Status: She is alert and oriented to person, place, and time.   Psychiatric:         Mood and Affect: Mood normal.         Behavior: Behavior normal.     POCT testing:   Flu a and B:  Negative   COVID: Negative    Assessment/plan:  URI:  The patient has a URI.  COVID and flu testing was negative.  There is an issue because of her prolonged QT syndrome with various medications and all medications were checked to be certain they do not interfere with the QT interval.    Plan:  Reviewed above with patient   Zyrtec 10 mg at bedtime   Mucinex DM 1/2-1 tablet twice daily   Saline nasal spray 4 squirts each nostril every 2 hours   Honey 1-2 tbsp directly off the spoon as often as desired.

## 2024-12-30 DIAGNOSIS — K50.018 CROHN'S DISEASE OF SMALL INTESTINE WITH OTHER COMPLICATION: ICD-10-CM

## 2024-12-30 RX ORDER — PROMETHAZINE HYDROCHLORIDE 25 MG/1
25 TABLET ORAL EVERY 6 HOURS PRN
Qty: 30 TABLET | Refills: 0 | Status: CANCELLED | OUTPATIENT
Start: 2024-12-30

## 2024-12-31 ENCOUNTER — PATIENT MESSAGE (OUTPATIENT)
Dept: GASTROENTEROLOGY | Facility: CLINIC | Age: 44
End: 2024-12-31
Payer: COMMERCIAL

## 2024-12-31 DIAGNOSIS — K50.018 CROHN'S DISEASE OF SMALL INTESTINE WITH OTHER COMPLICATION: ICD-10-CM

## 2024-12-31 RX ORDER — PROMETHAZINE HYDROCHLORIDE 25 MG/1
25 TABLET ORAL EVERY 6 HOURS PRN
Qty: 30 TABLET | Refills: 0 | Status: SHIPPED | OUTPATIENT
Start: 2024-12-31

## 2024-12-31 NOTE — TELEPHONE ENCOUNTER
From LOV note on 12/20/24    current IBD meds: Entyvio 300 mg IV q 8 weeks (11/20/18- 10/27/20- stopped due to UTIs and palpitations, reinduced 1/30/24, LD 12/18, ND 2/12)  - other medications: liquid imodium PRN; phenergan PRN for nausea ~2x/week

## 2025-01-03 ENCOUNTER — PATIENT MESSAGE (OUTPATIENT)
Dept: HEPATOLOGY | Facility: CLINIC | Age: 45
End: 2025-01-03
Payer: COMMERCIAL

## 2025-01-08 ENCOUNTER — PATIENT MESSAGE (OUTPATIENT)
Dept: ELECTROPHYSIOLOGY | Facility: CLINIC | Age: 45
End: 2025-01-08
Payer: COMMERCIAL

## 2025-01-09 ENCOUNTER — PATIENT MESSAGE (OUTPATIENT)
Dept: INTERNAL MEDICINE | Facility: CLINIC | Age: 45
End: 2025-01-09
Payer: COMMERCIAL

## 2025-01-09 ENCOUNTER — LAB VISIT (OUTPATIENT)
Dept: LAB | Facility: HOSPITAL | Age: 45
End: 2025-01-09
Attending: INTERNAL MEDICINE
Payer: COMMERCIAL

## 2025-01-09 DIAGNOSIS — K50.018 CROHN'S DISEASE OF SMALL INTESTINE WITH OTHER COMPLICATION: ICD-10-CM

## 2025-01-09 DIAGNOSIS — D84.821 IMMUNOSUPPRESSION DUE TO DRUG THERAPY: ICD-10-CM

## 2025-01-09 DIAGNOSIS — Z79.899 IMMUNOSUPPRESSION DUE TO DRUG THERAPY: ICD-10-CM

## 2025-01-09 PROCEDURE — 86480 TB TEST CELL IMMUN MEASURE: CPT | Performed by: INTERNAL MEDICINE

## 2025-01-09 NOTE — TELEPHONE ENCOUNTER
Benjamin Lane, I recommend at least in-office EKG and possibly referral to cardio. We can also order holter monitors if you think needed. Of course labs like tsh/cbc if you think needed.

## 2025-01-09 NOTE — TELEPHONE ENCOUNTER
Ms. Salgado returned call. States she feels fine today and she is at work when offered an appointment today for an evaluation. Will come in tomorrow at 0830 to see NOVA Coley

## 2025-01-10 ENCOUNTER — PATIENT MESSAGE (OUTPATIENT)
Dept: GASTROENTEROLOGY | Facility: CLINIC | Age: 45
End: 2025-01-10
Payer: COMMERCIAL

## 2025-01-10 ENCOUNTER — TELEPHONE (OUTPATIENT)
Dept: ELECTROPHYSIOLOGY | Facility: CLINIC | Age: 45
End: 2025-01-10
Payer: COMMERCIAL

## 2025-01-10 LAB
GAMMA INTERFERON BACKGROUND BLD IA-ACNC: 0.01 IU/ML
M TB IFN-G CD4+ BCKGRND COR BLD-ACNC: 0.01 IU/ML
M TB IFN-G CD4+ BCKGRND COR BLD-ACNC: 0.03 IU/ML
MITOGEN IGNF BCKGRD COR BLD-ACNC: 9.99 IU/ML
TB GOLD PLUS: NEGATIVE

## 2025-01-14 DIAGNOSIS — R00.2 PALPITATIONS: Primary | ICD-10-CM

## 2025-01-17 ENCOUNTER — CLINICAL SUPPORT (OUTPATIENT)
Dept: CARDIOLOGY | Facility: HOSPITAL | Age: 45
End: 2025-01-17
Attending: INTERNAL MEDICINE
Payer: COMMERCIAL

## 2025-01-17 DIAGNOSIS — R00.2 PALPITATIONS: ICD-10-CM

## 2025-01-17 DIAGNOSIS — F41.1 GENERALIZED ANXIETY DISORDER: ICD-10-CM

## 2025-01-17 RX ORDER — CLONAZEPAM 1 MG/1
1 TABLET ORAL 2 TIMES DAILY
Qty: 60 TABLET | Refills: 2 | Status: SHIPPED | OUTPATIENT
Start: 2025-01-17

## 2025-01-17 NOTE — TELEPHONE ENCOUNTER
No care due was identified.  [facility] Embedded Care Due Messages. Reference number: [id number].   1/17/2025 8:16:57 AM CST

## 2025-01-17 NOTE — TELEPHONE ENCOUNTER
Refill Encounter    PCP Visits: Recent Visits  Date Type Provider Dept   11/20/24 Office Visit Luis Madden DO [facility] Internal Medicine   02/02/24 Office Visit Weeks, Luis OCHOA DO [facility] Internal Medicine   Showing recent visits within past 360 days and meeting all other requirements  Future Appointments  No visits were found meeting these conditions.  Showing future appointments within next 720 days and meeting all other requirements     Last 3 Blood Pressure:   BP Readings from Last 3 Encounters:   12/23/24 (!) 145/104   12/20/24 101/74   12/18/24 110/75     Preferred Pharmacy:   [facility] [address] [place] [address]  Phone: [phone] Fax: [phone]    Requested RX:  Requested Prescriptions     Pending Prescriptions Disp Refills    clonazePAM (KLONOPIN) 1 MG tablet 60 tablet 2     Sig: Take 1 tablet (1 mg total) by mouth 2 (two) times daily.      RX Route: Normal

## 2025-01-26 ENCOUNTER — PATIENT MESSAGE (OUTPATIENT)
Dept: UROLOGY | Facility: CLINIC | Age: 45
End: 2025-01-26
Payer: COMMERCIAL

## 2025-01-27 ENCOUNTER — HOSPITAL ENCOUNTER (INPATIENT)
Facility: HOSPITAL | Age: 45
LOS: 2 days | Discharge: HOME | DRG: 389 | End: 2025-01-29
Attending: EMERGENCY MEDICINE | Admitting: COLON & RECTAL SURGERY
Payer: COMMERCIAL

## 2025-01-27 DIAGNOSIS — Z93.9 HISTORY OF CREATION OF OSTOMY: ICD-10-CM

## 2025-01-27 DIAGNOSIS — R10.2 PELVIC PAIN IN FEMALE: ICD-10-CM

## 2025-01-27 DIAGNOSIS — R11.10 VOMITING, UNSPECIFIED VOMITING TYPE, UNSPECIFIED WHETHER NAUSEA PRESENT: Primary | ICD-10-CM

## 2025-01-27 DIAGNOSIS — Z87.898 HX OF PROLONGED Q-T INTERVAL ON ECG: ICD-10-CM

## 2025-01-27 DIAGNOSIS — K56.609 SMALL BOWEL OBSTRUCTION: ICD-10-CM

## 2025-01-27 DIAGNOSIS — D72.829 LEUKOCYTOSIS, UNSPECIFIED TYPE: ICD-10-CM

## 2025-01-27 DIAGNOSIS — R10.9 ABDOMINAL PAIN, UNSPECIFIED ABDOMINAL LOCATION: ICD-10-CM

## 2025-01-27 LAB
ALBUMIN SERPL BCP-MCNC: 4 G/DL (ref 3.5–5.2)
ALP SERPL-CCNC: 155 U/L (ref 40–150)
ALT SERPL W/O P-5'-P-CCNC: 23 U/L (ref 10–44)
ANION GAP SERPL CALC-SCNC: 14 MMOL/L (ref 8–16)
AST SERPL-CCNC: 28 U/L (ref 10–40)
BASOPHILS # BLD AUTO: 0.03 K/UL (ref 0–0.2)
BASOPHILS NFR BLD: 0.2 % (ref 0–1.9)
BILIRUB SERPL-MCNC: 0.6 MG/DL (ref 0.1–1)
BILIRUB UR QL STRIP: NEGATIVE
BUN SERPL-MCNC: 15 MG/DL (ref 6–30)
BUN SERPL-MCNC: 16 MG/DL (ref 6–20)
CALCIUM SERPL-MCNC: 10 MG/DL (ref 8.7–10.5)
CHLORIDE SERPL-SCNC: 103 MMOL/L (ref 95–110)
CHLORIDE SERPL-SCNC: 103 MMOL/L (ref 95–110)
CLARITY UR REFRACT.AUTO: ABNORMAL
CO2 SERPL-SCNC: 23 MMOL/L (ref 23–29)
COLOR UR AUTO: YELLOW
CREAT SERPL-MCNC: 0.8 MG/DL (ref 0.5–1.4)
CREAT SERPL-MCNC: 0.8 MG/DL (ref 0.5–1.4)
CRP SERPL-MCNC: 1.3 MG/L (ref 0–8.2)
DIFFERENTIAL METHOD BLD: ABNORMAL
EOSINOPHIL # BLD AUTO: 0.1 K/UL (ref 0–0.5)
EOSINOPHIL NFR BLD: 0.5 % (ref 0–8)
ERYTHROCYTE [DISTWIDTH] IN BLOOD BY AUTOMATED COUNT: 14.6 % (ref 11.5–14.5)
EST. GFR  (NO RACE VARIABLE): >60 ML/MIN/1.73 M^2
GLUCOSE SERPL-MCNC: 134 MG/DL (ref 70–110)
GLUCOSE SERPL-MCNC: 141 MG/DL (ref 70–110)
GLUCOSE UR QL STRIP: NEGATIVE
HCT VFR BLD AUTO: 39.4 % (ref 37–48.5)
HCT VFR BLD CALC: 40 %PCV (ref 36–54)
HCV AB SERPL QL IA: NORMAL
HGB BLD-MCNC: 13 G/DL (ref 12–16)
HGB UR QL STRIP: NEGATIVE
HIV 1+2 AB+HIV1 P24 AG SERPL QL IA: NORMAL
IMM GRANULOCYTES # BLD AUTO: 0.06 K/UL (ref 0–0.04)
IMM GRANULOCYTES NFR BLD AUTO: 0.4 % (ref 0–0.5)
KETONES UR QL STRIP: NEGATIVE
LEUKOCYTE ESTERASE UR QL STRIP: NEGATIVE
LIPASE SERPL-CCNC: 29 U/L (ref 4–60)
LYMPHOCYTES # BLD AUTO: 1.8 K/UL (ref 1–4.8)
LYMPHOCYTES NFR BLD: 11.6 % (ref 18–48)
MCH RBC QN AUTO: 27.6 PG (ref 27–31)
MCHC RBC AUTO-ENTMCNC: 33 G/DL (ref 32–36)
MCV RBC AUTO: 84 FL (ref 82–98)
MONOCYTES # BLD AUTO: 1.2 K/UL (ref 0.3–1)
MONOCYTES NFR BLD: 7.6 % (ref 4–15)
NEUTROPHILS # BLD AUTO: 12.4 K/UL (ref 1.8–7.7)
NEUTROPHILS NFR BLD: 79.7 % (ref 38–73)
NITRITE UR QL STRIP: NEGATIVE
NRBC BLD-RTO: 0 /100 WBC
OHS QRS DURATION: 70 MS
OHS QTC CALCULATION: 454 MS
PH UR STRIP: >8 [PH] (ref 5–8)
PLATELET # BLD AUTO: 318 K/UL (ref 150–450)
PMV BLD AUTO: 9.9 FL (ref 9.2–12.9)
POC IONIZED CALCIUM: 1.15 MMOL/L (ref 1.06–1.42)
POC TCO2 (MEASURED): 24 MMOL/L (ref 23–29)
POTASSIUM BLD-SCNC: 3.3 MMOL/L (ref 3.5–5.1)
POTASSIUM SERPL-SCNC: 3.5 MMOL/L (ref 3.5–5.1)
PROT SERPL-MCNC: 8.7 G/DL (ref 6–8.4)
PROT UR QL STRIP: ABNORMAL
RBC # BLD AUTO: 4.71 M/UL (ref 4–5.4)
SAMPLE: ABNORMAL
SODIUM BLD-SCNC: 139 MMOL/L (ref 136–145)
SODIUM SERPL-SCNC: 140 MMOL/L (ref 136–145)
SP GR UR STRIP: 1.02 (ref 1–1.03)
URN SPEC COLLECT METH UR: ABNORMAL
WBC # BLD AUTO: 15.59 K/UL (ref 3.9–12.7)

## 2025-01-27 PROCEDURE — 63600175 PHARM REV CODE 636 W HCPCS: Mod: JZ,TB | Performed by: STUDENT IN AN ORGANIZED HEALTH CARE EDUCATION/TRAINING PROGRAM

## 2025-01-27 PROCEDURE — 93005 ELECTROCARDIOGRAM TRACING: CPT

## 2025-01-27 PROCEDURE — 12000002 HC ACUTE/MED SURGE SEMI-PRIVATE ROOM

## 2025-01-27 PROCEDURE — 63600175 PHARM REV CODE 636 W HCPCS

## 2025-01-27 PROCEDURE — 99222 1ST HOSP IP/OBS MODERATE 55: CPT | Mod: ,,, | Performed by: COLON & RECTAL SURGERY

## 2025-01-27 PROCEDURE — 93010 ELECTROCARDIOGRAM REPORT: CPT | Mod: ,,, | Performed by: INTERNAL MEDICINE

## 2025-01-27 PROCEDURE — 83690 ASSAY OF LIPASE: CPT | Performed by: EMERGENCY MEDICINE

## 2025-01-27 PROCEDURE — 81003 URINALYSIS AUTO W/O SCOPE: CPT | Performed by: EMERGENCY MEDICINE

## 2025-01-27 PROCEDURE — 85025 COMPLETE CBC W/AUTO DIFF WBC: CPT | Performed by: EMERGENCY MEDICINE

## 2025-01-27 PROCEDURE — 86803 HEPATITIS C AB TEST: CPT | Performed by: PHYSICIAN ASSISTANT

## 2025-01-27 PROCEDURE — 25000003 PHARM REV CODE 250

## 2025-01-27 PROCEDURE — 63600175 PHARM REV CODE 636 W HCPCS: Performed by: EMERGENCY MEDICINE

## 2025-01-27 PROCEDURE — 25000003 PHARM REV CODE 250: Performed by: EMERGENCY MEDICINE

## 2025-01-27 PROCEDURE — 99222 1ST HOSP IP/OBS MODERATE 55: CPT | Mod: ,,, | Performed by: INTERNAL MEDICINE

## 2025-01-27 PROCEDURE — 25500020 PHARM REV CODE 255: Performed by: EMERGENCY MEDICINE

## 2025-01-27 PROCEDURE — 86140 C-REACTIVE PROTEIN: CPT | Performed by: PHYSICIAN ASSISTANT

## 2025-01-27 PROCEDURE — 87389 HIV-1 AG W/HIV-1&-2 AB AG IA: CPT | Performed by: PHYSICIAN ASSISTANT

## 2025-01-27 PROCEDURE — 80053 COMPREHEN METABOLIC PANEL: CPT | Performed by: EMERGENCY MEDICINE

## 2025-01-27 RX ORDER — PROCHLORPERAZINE EDISYLATE 5 MG/ML
5 INJECTION INTRAMUSCULAR; INTRAVENOUS EVERY 6 HOURS PRN
Status: DISCONTINUED | OUTPATIENT
Start: 2025-01-27 | End: 2025-01-29 | Stop reason: HOSPADM

## 2025-01-27 RX ORDER — SODIUM CHLORIDE 0.9 % (FLUSH) 0.9 %
10 SYRINGE (ML) INJECTION
Status: DISCONTINUED | OUTPATIENT
Start: 2025-01-27 | End: 2025-01-29 | Stop reason: HOSPADM

## 2025-01-27 RX ORDER — HYDROMORPHONE HYDROCHLORIDE 1 MG/ML
1 INJECTION, SOLUTION INTRAMUSCULAR; INTRAVENOUS; SUBCUTANEOUS
Status: COMPLETED | OUTPATIENT
Start: 2025-01-27 | End: 2025-01-27

## 2025-01-27 RX ORDER — HYDROMORPHONE HYDROCHLORIDE 1 MG/ML
0.5 INJECTION, SOLUTION INTRAMUSCULAR; INTRAVENOUS; SUBCUTANEOUS EVERY 4 HOURS PRN
Status: DISCONTINUED | OUTPATIENT
Start: 2025-01-27 | End: 2025-01-29 | Stop reason: HOSPADM

## 2025-01-27 RX ORDER — HYDROMORPHONE HYDROCHLORIDE 1 MG/ML
1 INJECTION, SOLUTION INTRAMUSCULAR; INTRAVENOUS; SUBCUTANEOUS ONCE AS NEEDED
Status: COMPLETED | OUTPATIENT
Start: 2025-01-27 | End: 2025-01-27

## 2025-01-27 RX ORDER — HYDROMORPHONE HYDROCHLORIDE 1 MG/ML
2 INJECTION, SOLUTION INTRAMUSCULAR; INTRAVENOUS; SUBCUTANEOUS
Status: COMPLETED | OUTPATIENT
Start: 2025-01-27 | End: 2025-01-27

## 2025-01-27 RX ORDER — SODIUM CHLORIDE, SODIUM LACTATE, POTASSIUM CHLORIDE, CALCIUM CHLORIDE 600; 310; 30; 20 MG/100ML; MG/100ML; MG/100ML; MG/100ML
INJECTION, SOLUTION INTRAVENOUS CONTINUOUS
Status: DISCONTINUED | OUTPATIENT
Start: 2025-01-27 | End: 2025-01-28

## 2025-01-27 RX ADMIN — HYDROMORPHONE HYDROCHLORIDE 0.5 MG: 1 INJECTION, SOLUTION INTRAMUSCULAR; INTRAVENOUS; SUBCUTANEOUS at 11:01

## 2025-01-27 RX ADMIN — HYDROMORPHONE HYDROCHLORIDE 1 MG: 1 INJECTION, SOLUTION INTRAMUSCULAR; INTRAVENOUS; SUBCUTANEOUS at 08:01

## 2025-01-27 RX ADMIN — SODIUM CHLORIDE, POTASSIUM CHLORIDE, SODIUM LACTATE AND CALCIUM CHLORIDE: 600; 310; 30; 20 INJECTION, SOLUTION INTRAVENOUS at 11:01

## 2025-01-27 RX ADMIN — PROMETHAZINE HYDROCHLORIDE 12.5 MG: 25 INJECTION INTRAMUSCULAR; INTRAVENOUS at 06:01

## 2025-01-27 RX ADMIN — HYDROMORPHONE HYDROCHLORIDE 2 MG: 1 INJECTION, SOLUTION INTRAMUSCULAR; INTRAVENOUS; SUBCUTANEOUS at 04:01

## 2025-01-27 RX ADMIN — PROCHLORPERAZINE EDISYLATE 5 MG: 5 INJECTION INTRAMUSCULAR; INTRAVENOUS at 11:01

## 2025-01-27 RX ADMIN — HYDROMORPHONE HYDROCHLORIDE 0.5 MG: 1 INJECTION, SOLUTION INTRAMUSCULAR; INTRAVENOUS; SUBCUTANEOUS at 07:01

## 2025-01-27 RX ADMIN — PYRIDOXINE HYDROCHLORIDE 50 MG: 100 INJECTION, SOLUTION INTRAMUSCULAR; INTRAVENOUS at 09:01

## 2025-01-27 RX ADMIN — HYDROMORPHONE HYDROCHLORIDE 1 MG: 1 INJECTION, SOLUTION INTRAMUSCULAR; INTRAVENOUS; SUBCUTANEOUS at 04:01

## 2025-01-27 RX ADMIN — IOHEXOL 75 ML: 350 INJECTION, SOLUTION INTRAVENOUS at 05:01

## 2025-01-27 RX ADMIN — HYDROMORPHONE HYDROCHLORIDE 0.5 MG: 1 INJECTION, SOLUTION INTRAMUSCULAR; INTRAVENOUS; SUBCUTANEOUS at 03:01

## 2025-01-27 RX ADMIN — HYDROMORPHONE HYDROCHLORIDE 1 MG: 1 INJECTION, SOLUTION INTRAMUSCULAR; INTRAVENOUS; SUBCUTANEOUS at 06:01

## 2025-01-27 NOTE — CONSULTS
[facility] - Emergency Dept  General Surgery  Consult Note    Patient Name: Ivy Salgado  MRN: [id number]  Code Status: Prior  Admission Date: 1/27/2025  Hospital Length of Stay: 0 days  Attending Physician: Cristiane Guido MD  Primary Care Provider: Luis Madden DO    Patient information was obtained from patient and past medical records.     Inpatient consult to Colorectal Surgery  Consult performed by: Issac Cobos MD  Consult ordered by: Candice Barone MD        Subjective:     Principal Problem: Crohn's disease of small intestine with other complication    History of Present Illness: Ivy Salgado is a 44 y.o. lady with Crohn's Crohn's s/p TAC end ileostomy and 3 SBOs in the past year, now with 1 day of progressive abdominal pain, nausea, vomiting, and decreased stoma output. She states it feels like previous SBOs. In the ED, workup revealed an SBO without discrete transition point but dilated proximal and decompressed distal small bowel. CT appears similar to her previous CT at the time of her last SBO in 10/2024. For this, CRS was called for further management. Labs with leukocytosis to 15 without other abnormalities. After her last episode of SBO, she had an MRE that showed inflammation. Upon meeting with GI as an outpatient, she had a scope done through her ileostomy without visualized inflammation. She is pending discussion with a multidisciplinary clinic for management of her Crohn's. Currently, the discussion is whether her recurrent SBOs are from uncontrolled Crohn's vs stricture at her prior anastomosis vs adhesive disease.     No current facility-administered medications on file prior to encounter.     Current Outpatient Medications on File Prior to Encounter   Medication Sig    clonazePAM (KLONOPIN) 1 MG tablet Take 1 tablet (1 mg total) by mouth 2 (two) times daily.    cyclobenzaprine (FLEXERIL) 10 MG tablet Take 1 tablet (10 mg total) by mouth every evening as needed for muscle  pain    droNABinol (MARINOL) 10 MG capsule Take 1 capsule (10 mg total) by mouth once daily.    estradiol 0.025 mg/24 hr 0.025 mg/24 hr Place 1 patch onto the skin once a week. CHANGES ON MONDAYS    gabapentin (NEURONTIN) 300 MG capsule Take 1 capsule (300 mg total) by mouth 2 (two) times daily.    loperamide (IMODIUM) 1 mg/7.5 mL solution Take 4 mg by mouth 3 (three) times daily as needed for Diarrhea.    mirtazapine (REMERON SOL-TAB) 30 MG disintegrating tablet Take 1 tablet (30 mg total) by mouth nightly.    multivitamin (THERAGRAN) per tablet Take 1 tablet by mouth once daily.    nadoloL (CORGARD) 40 MG tablet Take 1 tablet (40 mg total) by mouth once daily. Patient needs appointment before next refill.    pantoprazole (PROTONIX) 40 MG tablet Take 1 tablet (40 mg total) by mouth 2 (two) times daily.    promethazine (PHENERGAN) 25 MG tablet Take 1 tablet (25 mg total) by mouth every 6 (six) hours as needed for Nausea.    tamsulosin (FLOMAX) 0.4 mg Cap Take 1 capsule (0.4 mg total) by mouth once daily.    valACYclovir (VALTREX) 500 MG tablet Take 1 tablet (500 mg total) by mouth once daily.    vedolizumab (ENTYVIO) 300 mg SolR injection Inject 300 mg into the vein every 8 weeks.    zolpidem (AMBIEN) 10 mg Tab Take 1 tablet (10 mg total) by mouth every evening.       Review of patient's allergies indicates:   Allergen Reactions    Azathioprine sodium Other (See Comments)     Other reaction(s): pancreatitis  Other reaction(s): pancreatitis    Methotrexate analogues Other (See Comments)     leukopenia    Stelara [ustekinumab] Other (See Comments)     Multiple infections    Zofran [ondansetron hcl (pf)] Other (See Comments)     Per patient causes prolong QT    Vancomycin analogues Other (See Comments)     Made her red    Azathioprine      Other reaction(s): Unknown    Methotrexate      Other reaction(s): infection-    Morphine Itching and Other (See Comments)     Other reaction(s): Itching    Zofran [ondansetron hcl]       Other reaction(s): Hives    Bactrim [sulfamethoxazole-trimethoprim] Palpitations    Ciprofloxacin Palpitations       Past Medical History:   Diagnosis Date    Abnormal Pap smear 2007    Alkaline phosphatase elevation- based on lab from 4/9/2019 05/28/2019    Arthritis     Avascular necrosis of bone of hip, left     Bacterial vaginosis     Crohn disease     Depression 08/05/2017    Drug-induced pancreatitis (imuran)     Encounter for blood transfusion     Generalized anxiety disorder     Genital HSV     GERD (gastroesophageal reflux disease)     Hypertension     Ileostomy in place 07/09/2012    Kidney stone     Long QT syndrome     Melanoma in situ (excised-buttocks 2018, para-stomal site 2019)     Moderate episode of recurrent major depressive disorder 02/02/2024    Multiple thyroid nodules 11/27/2018    Osteopenia of multiple sites 01/07/2019    Pancreas cyst (tail, 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)     Recurrent Clostridioides difficile diarrhea     Recurrent UTI 04/03/2013     Past Surgical History:   Procedure Laterality Date    ABDOMINAL SURGERY      APPENDECTOMY      ARTHROPLASTY OF SHOULDER Left 7/18/2023    Procedure: ARTHROPLASTY, SHOULDER;  Surgeon: Sharon Babb MD;  Location: [facility] OR;  Service: Orthopedics;  Laterality: Left;    AUGMENTATION OF BREAST Bilateral 06/2022    gel implants    BILATERAL SALPINGO-OOPHORECTOMY (BSO) Bilateral 05/30/2019    Procedure: SALPINGO-OOPHORECTOMY, BILATERAL;  Surgeon: Rupa German MD;  Location: [facility] OR 2ND FLR;  Service: OB/GYN;  Laterality: Bilateral;    BLADDER SURGERY      partial cystectomy due to fistula    breast lift      BREAST SURGERY      [facility]      COLON SURGERY      COLONOSCOPY      CYSTOGRAM  12/11/2024    Procedure: CYSTOGRAM;  Surgeon: Lexi Villalba MD;  Location: [facility] OR;  Service: Urology;;    CYSTOSCOPY  09/23/2020    Procedure: CYSTOSCOPY;  Surgeon: Sascha Florentino MD;  Location: [facility] OR 1ST FLR;  Service: Urology;;    CYSTOSCOPY N/A 12/11/2024     Procedure: CYSTOSCOPY;  Surgeon: Lexi Villalba MD;  Location: [facility] OR;  Service: Urology;  Laterality: N/A;    CYSTOSCOPY W/ URETERAL STENT PLACEMENT Left 09/15/2020    Procedure: CYSTOSCOPY, WITH URETERAL STENT INSERTION;  Surgeon: Sascha Florentino MD;  Location: [facility] OR 1ST FLR;  Service: Urology;  Laterality: Left;    DIAGNOSTIC LAPAROSCOPY N/A 07/09/2020    Procedure: LAPAROSCOPY, DIAGNOSTIC;  Surgeon: Gilson El MD;  Location: [facility] OR;  Service: OB/GYN;  Laterality: N/A;    ESOPHAGOGASTRODUODENOSCOPY N/A 1/3/2024    Procedure: EGD (ESOPHAGOGASTRODUODENOSCOPY);  Surgeon: Lily Lind MD;  Location: [facility] ENDO (2ND FLR);  Service: Endoscopy;  Laterality: N/A;    EXCISION OF MELANOMA  07/17/2019    ILEOSCOPY N/A 1/3/2024    Procedure: ILEOSCOPY;  Surgeon: Lily Lind MD;  Location: [facility] ENDO (2ND FLR);  Service: Endoscopy;  Laterality: N/A;    ILEOSCOPY N/A 1/24/2024    Procedure: ILEOSCOPY;  Surgeon: Lilian Navarro MD;  Location: [facility] ENDO (2ND FLR);  Service: Endoscopy;  Laterality: N/A;  through stoma    ILEOSCOPY N/A 6/4/2024    Procedure: ILEOSCOPY;  Surgeon: Peace Garcia MD;  Location: [facility] ENDO (4TH FLR);  Service: Endoscopy;  Laterality: N/A;  Ref By:,[facility] sent via [place],  received urgent message to reschedule pt from 7/15  to may or june-updated prep instructions sent-   5/29/24- precall complete - ERW    ILEOSCOPY N/A 12/2/2024    Procedure: ILEOSCOPY;  Surgeon: Peace Garcia MD;  Location: [facility] ENDO (4TH FLR);  Service: Endoscopy;  Laterality: N/A;  Ref By:,lucy,half miralax.  11/22 lvm for precall-st  11/27/24 attempted precall no answer LVM   11/29-LVM for pre call.  No answe.-JM/ES  11/29-pt lvm confirming appt-KPvt    ILEOSTOMY      INSERTION OF TUNNELED CENTRAL VENOUS CATHETER (CVC) WITH SUBCUTANEOUS PORT Right 9/10/2024    Procedure: INSERTION, SINGLE LUMEN CATHETER WITH PORT, WITH FLUOROSCOPIC GUIDANCE, Rt neck or chest,  prefers chest;  Surgeon: Vinh Lloyd MD;  Location: [facility] OR 2ND FLR;  Service: General;  Laterality: Right;    LAPAROSCOPIC LYSIS OF ADHESIONS N/A 07/09/2020    Procedure: LYSIS, ADHESIONS, LAPAROSCOPIC;  Surgeon: Gilson El MD;  Location: [facility] OR;  Service: OB/GYN;  Laterality: N/A;    LASER LITHOTRIPSY  09/23/2020    Procedure: LITHOTRIPSY, USING LASER;  Surgeon: Sascha Florentino MD;  [facility];  Service: Urology;;    LYSIS OF ADHESIONS N/A 05/30/2019    Procedure: LYSIS, ADHESIONS;  Surgeon: Rupa German MD;  [facility];  Service: OB/GYN;  Laterality: N/A;    MEDIPORT REMOVAL Left 9/10/2024    Procedure: REMOVAL, CATHETER, CENTRAL VENOUS, TUNNELED, WITH PORT, Left side;  Surgeon: Vinh Lloyd MD;  [facility];  Service: General;  Laterality: Left;    OOPHORECTOMY Right 04/16/2015    PORTACATH PLACEMENT  02/21/2017    SKIN BIOPSY      SMALL INTESTINE SURGERY      age 18 Y    TOTAL ABDOMINAL HYSTERECTOMY  04/16/2015    TOTAL COLECTOMY      TUBAL LIGATION  06/06/2012    UPPER GASTROINTESTINAL ENDOSCOPY      URETEROSCOPIC REMOVAL OF URETERIC CALCULUS  09/23/2020    Procedure: REMOVAL, CALCULUS, URETER, URETEROSCOPIC;  Surgeon: Sascha Florentino MD;  [facility];  Service: Urology;;    URETEROSCOPY Left 09/23/2020    Procedure: URETEROSCOPY;  Surgeon: Sascha Florentino MD;  [facility];  Service: Urology;  Laterality: Left;     Family History       Problem Relation (Age of Onset)    Breast cancer Maternal Cousin (41)    Cancer Maternal Grandfather, Father    Colon cancer Father    Crohn's disease Brother, Daughter    Diabetes Paternal Grandfather, Maternal Grandfather    Endometrial cancer Maternal Aunt    Hearing loss Paternal Grandmother    Heart disease Maternal Grandfather    Hyperlipidemia Father    Hypertension Mother    Liver cancer Father    Skin cancer Maternal Grandfather          Tobacco Use    Smoking status:  Never     Passive exposure: Past    Smokeless tobacco: Never   Substance and Sexual Activity    Alcohol use: Not Currently     Alcohol/week: 0.0 standard drinks of alcohol    Drug use: No    Sexual activity: Yes     Partners: Male     Birth control/protection: See Surgical Hx     Comment: HYST     Review of Systems   Constitutional:  Negative for chills and fever.   HENT:  Negative for hearing loss and trouble swallowing.    Eyes:  Negative for discharge and visual disturbance.   Respiratory:  Negative for chest tightness and shortness of breath.    Cardiovascular:  Negative for chest pain and palpitations.   Gastrointestinal:  Positive for abdominal distention, abdominal pain, nausea and vomiting.   Genitourinary:  Negative for difficulty urinating and hematuria.   Musculoskeletal:  Negative for arthralgias and back pain.   Skin:  Negative for rash and wound.   Neurological:  Negative for dizziness and headaches.     Objective:     Vital Signs (Most Recent):  Temp: 98.6 °F (37 °C) (01/27/25 0710)  Pulse: 84 (01/27/25 0710)  Resp: 16 (01/27/25 0831)  BP: 118/68 (01/27/25 0710)  SpO2: 100 % (01/27/25 0710) Vital Signs (24h Range):  Temp:  [97.9 °F (36.6 °C)-98.6 °F (37 °C)] 98.6 °F (37 °C)  Pulse:  [77-97] 84  Resp:  [16-22] 16  SpO2:  [100 %] 100 %  BP: (118-141)/(68-90) 118/68        There is no height or weight on file to calculate BMI.     Physical Exam  Constitutional:       General: She is not in acute distress.     Appearance: Normal appearance.   HENT:      Head: Normocephalic and atraumatic.   Cardiovascular:      Rate and Rhythm: Normal rate and regular rhythm.   Pulmonary:      Effort: Pulmonary effort is normal. No respiratory distress.   Abdominal:      General: Abdomen is flat. There is no distension.      Palpations: Abdomen is soft.      Tenderness: There is no abdominal tenderness.      Comments: Ileostomy in place without output  Midline incision well healed  Distended  Mildly tender to palpation    Neurological:      General: No focal deficit present.      Mental Status: She is alert and oriented to person, place, and time.   Psychiatric:         Behavior: Behavior normal.         Thought Content: Thought content normal.            I have reviewed all pertinent lab results within the past 24 hours.  CBC:   Recent Labs   Lab 01/27/25 0417 01/27/25  0436   WBC 15.59*  --    RBC 4.71  --    HGB 13.0  --    HCT 39.4 40     --    MCV 84  --    MCH 27.6  --    MCHC 33.0  --      CMP:   Recent Labs   Lab 01/27/25 0417   *   CALCIUM 10.0   ALBUMIN 4.0   PROT 8.7*      K 3.5   CO2 23      BUN 16   CREATININE 0.8   ALKPHOS 155*   ALT 23   AST 28   BILITOT 0.6       Significant Diagnostics:  I have reviewed all pertinent imaging results/findings within the past 24 hours.    Assessment/Plan:     * Crohn's disease of ileum with end ileostomy  Ivy Salgado si a 44 y.o. lady with Crohn's s/p TAC and end ileostomy and has recurrent SBOs. She comes in for another SBO    - admit to colorectal surgery  - NPO  - NG tube. She is refusing at this time, she understands the risks associated  - last time, she resolved without an NG tube and had a gastrograffin challenge a couple of days later  - mIVF  - discussed that if she failed a GGC, then she may need a lap vs open lysis of adhesions      VTE Risk Mitigation (From admission, onward)      None            Thank you for your consult. I will follow-up with patient. Please contact us if you have any additional questions.    Issac Cobos MD  General Surgery  [facility] - Emergency Dept

## 2025-01-27 NOTE — H&P
Colorectal Surgery H&P    Please see consult note from 1/27    Issac Cobos MD  [facility] PGY5

## 2025-01-27 NOTE — ED TRIAGE NOTES
Pt states that she was feeling bad on and off for the past two days. Pt states that tonight at 10 PM she started to feel real bad and started to vomit and have severe abdominal cramping. Pt denies C/P or SOB.

## 2025-01-27 NOTE — ED PROVIDER NOTES
Encounter Date: 1/27/2025       History     Chief Complaint   Patient presents with    Abdominal Pain    Vomiting     Pt has c/o severe abd pain, N/V beginning 10 pm. Pt has hx of chron's and SBO and reports feels exact same. Took phenergan PTA w/ no relief. Pt screaming in triage.      HPI    Ivy Salgado is a 44 y.o. female with PMH of Crohn's disease with ileostomy, history of bowel obstruction, hypertension, long QT, avascular necrosis, presenting to [facility] ED for abdominal pain.  Patient has had severe diffuse abdominal pain.  She has had several episodes of vomiting.  States that this feels similar to bowel obstructions she has had in the past.  States that her last output into her ostomy was at 12:00 p.m. 01/26/2025.      Review of patient's allergies indicates:   Allergen Reactions    Azathioprine sodium Other (See Comments)     Other reaction(s): pancreatitis  Other reaction(s): pancreatitis    Methotrexate analogues Other (See Comments)     leukopenia    Stelara [ustekinumab] Other (See Comments)     Multiple infections    Zofran [ondansetron hcl (pf)] Other (See Comments)     Per patient causes prolong QT    Vancomycin analogues Other (See Comments)     Made her red    Azathioprine      Other reaction(s): Unknown    Methotrexate      Other reaction(s): infection-    Morphine Itching and Other (See Comments)     Other reaction(s): Itching    Zofran [ondansetron hcl]      Other reaction(s): Hives    Bactrim [sulfamethoxazole-trimethoprim] Palpitations    Ciprofloxacin Palpitations     Past Medical History:   Diagnosis Date    Abnormal Pap smear 2007    Alkaline phosphatase elevation- based on lab from 4/9/2019 05/28/2019    Arthritis     Avascular necrosis of bone of hip, left     Bacterial vaginosis     Crohn disease     Depression 08/05/2017    Drug-induced pancreatitis (imuran)     Encounter for blood transfusion     Generalized anxiety disorder     Genital HSV     GERD (gastroesophageal reflux  disease)     Hypertension     Ileostomy in place 07/09/2012    Kidney stone     Long QT syndrome     Melanoma in situ (excised-buttocks 2018, para-stomal site 2019)     Moderate episode of recurrent major depressive disorder 02/02/2024    Multiple thyroid nodules 11/27/2018    Osteopenia of multiple sites 01/07/2019    Pancreas cyst (tail, 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)     Recurrent Clostridioides difficile diarrhea     Recurrent UTI 04/03/2013     Past Surgical History:   Procedure Laterality Date    ABDOMINAL SURGERY      APPENDECTOMY      ARTHROPLASTY OF SHOULDER Left 7/18/2023    Procedure: ARTHROPLASTY, SHOULDER;  Surgeon: Sharon Babb MD;  Location: [facility] OR;  Service: Orthopedics;  Laterality: Left;    AUGMENTATION OF BREAST Bilateral 06/2022    gel implants    BILATERAL SALPINGO-OOPHORECTOMY (BSO) Bilateral 05/30/2019    Procedure: SALPINGO-OOPHORECTOMY, BILATERAL;  Surgeon: Rupa German MD;  Location: [facility] OR 2ND FLR;  Service: OB/GYN;  Laterality: Bilateral;    BLADDER SURGERY      partial cystectomy due to fistula    breast lift      BREAST SURGERY      [facility]      COLON SURGERY      COLONOSCOPY      CYSTOGRAM  12/11/2024    Procedure: CYSTOGRAM;  Surgeon: Lexi Villalba MD;  Location: [facility] OR;  Service: Urology;;    CYSTOSCOPY  09/23/2020    Procedure: CYSTOSCOPY;  Surgeon: Sascha Florentino MD;  Location: [facility] OR 1ST FLR;  Service: Urology;;    CYSTOSCOPY N/A 12/11/2024    Procedure: CYSTOSCOPY;  Surgeon: Lexi Villalba MD;  Location: [facility] OR;  Service: Urology;  Laterality: N/A;    CYSTOSCOPY W/ URETERAL STENT PLACEMENT Left 09/15/2020    Procedure: CYSTOSCOPY, WITH URETERAL STENT INSERTION;  Surgeon: Sascha Florentino MD;  Location: [facility] OR 1ST FLR;  Service: Urology;  Laterality: Left;    DIAGNOSTIC LAPAROSCOPY N/A 07/09/2020    Procedure: LAPAROSCOPY, DIAGNOSTIC;  Surgeon: Gilson El MD;  Location: [facility] OR;  Service: OB/GYN;  Laterality: N/A;    ESOPHAGOGASTRODUODENOSCOPY N/A 1/3/2024     Procedure: EGD (ESOPHAGOGASTRODUODENOSCOPY);  Surgeon: Lily Lind MD;  Location: [facility] ENDO (2ND FLR);  Service: Endoscopy;  Laterality: N/A;    EXCISION OF MELANOMA  07/17/2019    ILEOSCOPY N/A 1/3/2024    Procedure: ILEOSCOPY;  Surgeon: Lily Lind MD;  Location: [facility] ENDO (2ND FLR);  Service: Endoscopy;  Laterality: N/A;    ILEOSCOPY N/A 1/24/2024    Procedure: ILEOSCOPY;  Surgeon: Lilian Navarro MD;  Location: [facility] ENDO (2ND FLR);  Service: Endoscopy;  Laterality: N/A;  through stoma    ILEOSCOPY N/A 6/4/2024    Procedure: ILEOSCOPY;  Surgeon: Peace Garcia MD;  Location: [facility] ENDO (4TH FLR);  Service: Endoscopy;  Laterality: N/A;  Ref By:,[facility] sent via portal,  received urgent message to reschedule pt from 7/15  to may or june-updated prep instructions sent-   5/29/24- precall complete - ERW    ILEOSCOPY N/A 12/2/2024    Procedure: ILEOSCOPY;  Surgeon: Peace Garcia MD;  Location: [facility] ENDO (4TH FLR);  Service: Endoscopy;  Laterality: N/A;  Ref By:,Reddick,half miralax.  11/22 lvm for precall-st  11/27/24 attempted precall no answer LVM   11/29-LVM for pre call.  No answe.-/  11/29-pt lvm confirming appt-KPvt    ILEOSTOMY      INSERTION OF TUNNELED CENTRAL VENOUS CATHETER (CVC) WITH SUBCUTANEOUS PORT Right 9/10/2024    Procedure: INSERTION, SINGLE LUMEN CATHETER WITH PORT, WITH FLUOROSCOPIC GUIDANCE, Rt neck or chest, prefers chest;  Surgeon: Vinh Lloyd MD;  Location: [facility] OR 2ND FLR;  Service: General;  Laterality: Right;    LAPAROSCOPIC LYSIS OF ADHESIONS N/A 07/09/2020    Procedure: LYSIS, ADHESIONS, LAPAROSCOPIC;  Surgeon: Gilson lE MD;  Location: [facility] OR;  Service: OB/GYN;  Laterality: N/A;    LASER LITHOTRIPSY  09/23/2020    Procedure: LITHOTRIPSY, USING LASER;  Surgeon: Sascha Florentino MD;  Location: [facility] OR [address] [place] [address];  Service: Urology;;    LYSIS OF ADHESIONS N/A 05/30/2019    Procedure: LYSIS, ADHESIONS;  Surgeon: Rupa German,  MD;  Location: [facility] OR 2ND FLR;  Service: OB/GYN;  Laterality: N/A;    MEDIPORT REMOVAL Left 9/10/2024    Procedure: REMOVAL, CATHETER, CENTRAL VENOUS, TUNNELED, WITH PORT, Left side;  Surgeon: Vinh Lloyd MD;  Location: [facility] OR 2ND FLR;  Service: General;  Laterality: Left;    OOPHORECTOMY Right 04/16/2015    PORTACATH PLACEMENT  02/21/2017    SKIN BIOPSY      SMALL INTESTINE SURGERY      age 18 Y    TOTAL ABDOMINAL HYSTERECTOMY  04/16/2015    TOTAL COLECTOMY      TUBAL LIGATION  06/06/2012    UPPER GASTROINTESTINAL ENDOSCOPY      URETEROSCOPIC REMOVAL OF URETERIC CALCULUS  09/23/2020    Procedure: REMOVAL, CALCULUS, URETER, URETEROSCOPIC;  Surgeon: Sascha Florentino MD;  Location: [facility] OR 1ST FLR;  Service: Urology;;    URETEROSCOPY Left 09/23/2020    Procedure: URETEROSCOPY;  Surgeon: Sascha Florentino MD;  Location: [facility] OR 1ST FLR;  Service: Urology;  Laterality: Left;     Family History   Problem Relation Name Age of Onset    Diabetes Paternal Grandfather Stephen     Hearing loss Paternal Grandmother Viviane     Cancer Maternal Grandfather Philippe         Skin    Skin cancer Maternal Grandfather Philippe     Diabetes Maternal Grandfather Philippe     Heart disease Maternal Grandfather Philippe     Colon cancer Father Milan     Cancer Father Milan         Colon    Liver cancer Father Milan     Hyperlipidemia Father Milan     Hypertension Mother Shaila     Crohn's disease Brother      Endometrial cancer Maternal Aunt      Breast cancer Maternal Cousin  41    Crohn's disease Daughter      Ovarian cancer Neg Hx      Melanoma Neg Hx       Social History     Tobacco Use    Smoking status: Never     Passive exposure: Past    Smokeless tobacco: Never   Substance Use Topics    Alcohol use: Not Currently     Alcohol/week: 0.0 standard drinks of alcohol    Drug use: No     Review of Systems   Constitutional:  Negative for fever.   HENT:  Negative for congestion, rhinorrhea and sore throat.    Eyes:  Negative  for visual disturbance.   Respiratory:  Negative for cough and shortness of breath.    Cardiovascular:  Negative for chest pain and leg swelling.   Gastrointestinal:  Positive for abdominal pain, constipation, nausea and vomiting. Negative for diarrhea.   Genitourinary:  Negative for dysuria and hematuria.   Skin:  Negative for rash.   Neurological:  Negative for weakness.       Physical Exam     Initial Vitals [01/27/25 0330]   BP Pulse Resp Temp SpO2   (!) 139/90 97 (!) 22 97.9 °F (36.6 °C) 100 %      MAP       --         Physical Exam    Nursing note and vitals reviewed.  Constitutional: She is cooperative.  Non-toxic appearance. She does not appear ill. She appears distressed (Secondary to pain).   HENT:   Head: Normocephalic and atraumatic. Mouth/Throat: Mucous membranes are dry.   Eyes: Conjunctivae are normal.   Neck: Phonation normal.   Cardiovascular:  Normal rate, regular rhythm and normal heart sounds.     Exam reveals no gallop, no S3, no S4 and no friction rub.       No murmur heard.  Pulmonary/Chest: Breath sounds normal. No respiratory distress. She has no wheezes. She has no rhonchi. She has no rales.   Abdominal: Abdomen is soft. She exhibits no distension. There is generalized abdominal tenderness.   Musculoskeletal:      Right lower leg: No edema.      Left lower leg: No edema.     Neurological: She is alert.   Skin: Skin is warm, dry and intact. Capillary refill takes less than 2 seconds.   Psychiatric: She has a normal mood and affect. Her speech is normal.         ED Course   Procedures  Labs Reviewed   CBC W/ AUTO DIFFERENTIAL - Abnormal       Result Value    WBC 15.59 (*)     RBC 4.71      Hemoglobin 13.0      Hematocrit 39.4      MCV 84      MCH 27.6      MCHC 33.0      RDW 14.6 (*)     Platelets 318      MPV 9.9      Immature Granulocytes 0.4      Gran # (ANC) 12.4 (*)     Immature Grans (Abs) 0.06 (*)     Lymph # 1.8      Mono # 1.2 (*)     Eos # 0.1      Baso # 0.03      nRBC 0      Gran  % 79.7 (*)     Lymph % 11.6 (*)     Mono % 7.6      Eosinophil % 0.5      Basophil % 0.2      Differential Method Automated      Narrative:     Release to patient->Immediate   COMPREHENSIVE METABOLIC PANEL - Abnormal    Sodium 140      Potassium 3.5      Chloride 103      CO2 23      Glucose 134 (*)     BUN 16      Creatinine 0.8      Calcium 10.0      Total Protein 8.7 (*)     Albumin 4.0      Total Bilirubin 0.6      Alkaline Phosphatase 155 (*)     AST 28      ALT 23      eGFR >60.0      Anion Gap 14      Narrative:     Release to patient->Immediate   URINALYSIS, REFLEX TO URINE CULTURE - Abnormal    Specimen UA Urine, Clean Catch      Color, UA Yellow      Appearance, UA Hazy (*)     pH, UA >8.0 (*)     Specific Gravity, UA 1.025      Protein, UA Trace (*)     Glucose, UA Negative      Ketones, UA Negative      Bilirubin (UA) Negative      Occult Blood UA Negative      Nitrite, UA Negative      Leukocytes, UA Negative      Narrative:     Specimen Source->Urine   ISTAT PROCEDURE - Abnormal    POC Glucose 141 (*)     POC BUN 15      POC Creatinine 0.8      POC Sodium 139      POC Potassium 3.3 (*)     POC Chloride 103      POC TCO2 (MEASURED) 24      POC Ionized Calcium 1.15      POC Hematocrit 40      Sample DANIEL     HEPATITIS C ANTIBODY    Hepatitis C Ab Non-reactive      Narrative:     Release to patient->Immediate   HIV 1 / 2 ANTIBODY    HIV 1/2 Ag/Ab Non-reactive      Narrative:     Release to patient->Immediate   LIPASE    Lipase 29      Narrative:     Release to patient->Immediate   ISTAT CHEM8          Imaging Results              CT Abdomen Pelvis With IV Contrast NO Oral Contrast (In process)                      Medications   promethazine (PHENERGAN) 12.5 mg in 0.9% NaCl 50 mL IVPB (has no administration in time range)   HYDROmorphone injection 1 mg (1 mg Intravenous Given 1/27/25 2688)   HYDROmorphone injection 2 mg (2 mg Intravenous Given 1/27/25 0021)   iohexoL (OMNIPAQUE 350) injection 75 mL (75 mLs  Intravenous Given 1/27/25 0540)     Medical Decision Making  44-year-old female with complex medical history presenting for abdominal pain.  Initially, patient is hypertensive, afebrile.  She appears to be in acute distress secondary to pain.  Patient given Dilaudid x2.  Discussed possibility of using antiemetics since patient's QTC is 450.  Patient declining at this time.      Since patient has severe abdominal pain, nausea/vomiting, decreased output into her ostomy, concerning for bowel obstruction.  Will assess for evidence of JASBIR, urinary tract infection, gastritis/enteritis.  Also considered Crohn's flare.     Upon reassessment, patient endorses recurrence of pain.  Will give additional dose of Dilaudid.  Patient has a leukocytosis and left shift.  No UTI on urinalysis.  No significant electrolyte disturbance.  Lipase is normal, very low suspicion for pancreatitis.  Independent interpretation of CT is concerning for bowel obstruction with transition point near ostomy.  Discussed patient's case with colorectal surgery who agreed to evaluate patient.      Discussed patient's case with oncoming team, patient pending CT read, Colorectal surgery recommendations.  Anticipate disposition is admission to Colorectal surgery or Hospital Medicine for bowel obstruction.      Amount and/or Complexity of Data Reviewed  Labs:  Decision-making details documented in ED Course.  Radiology: ordered. Decision-making details documented in ED Course.    Risk  Prescription drug management.               ED Course as of 01/27/25 0557   Mon Jan 27, 2025   0503 WBC(!): 15.59 [ES]   0503 Gran # (ANC)(!): 12.4 [ES]   0545 CT Abdomen Pelvis With IV Contrast NO Oral Contrast  Independent interpretation: Appears to be consistent with bowel obstruction since patient has air-fluid levels.  Pending radiology read. [ES]   0546 WBC(!): 15.59 [ES]   0546 Gran # (ANC)(!): 12.4 [ES]   0546 Urinalysis, Reflex to Urine Culture Urine, Clean  Catch(!)  No UTI [ES]   0546 Lipase: 29 [ES]   0546 Comp. Metabolic Panel(!)  No significant electrolyte abnormalities [ES]   0555 Discussed patient's case with colorectal surgery who agreed to evaluate patient [ES]      ED Course User Index  [ES] Candice Barone MD                           Clinical Impression:  Final diagnoses:  [R11.10] Vomiting, unspecified vomiting type, unspecified whether nausea present (Primary)  [R10.9] Abdominal pain, unspecified abdominal location  [D72.829] Leukocytosis, unspecified type  [Z93.9] History of creation of ostomy                 Candice Barone MD  Resident  01/27/25 0523

## 2025-01-27 NOTE — ASSESSMENT & PLAN NOTE
Ivy Salgado si a 44 y.o. lady with Crohn's s/p TAC and end ileostomy and has recurrent SBOs. She comes in for another SBO    - admit to colorectal surgery  - NPO  - NG tube. She is refusing at this time, she understands the risks associated  - last time, she resolved without an NG tube and had a gastrograffin challenge a couple of days later  - mIVF  - discussed that if she failed a GGC, then she may need a lap vs open lysis of adhesions

## 2025-01-27 NOTE — HPI
Ivy Salgado is a 44 y.o. lady with Crohn's Crohn's s/p TAC end ileostomy and 3 SBOs in the past year, now with 1 day of progressive abdominal pain, nausea, vomiting, and decreased stoma output. She states it feels like previous SBOs. In the ED, workup revealed an SBO without discrete transition point but dilated proximal and decompressed distal small bowel. CT appears similar to her previous CT at the time of her last SBO in 10/2024. For this, CRS was called for further management. Labs with leukocytosis to 15 without other abnormalities. After her last episode of SBO, she had an MRE that showed inflammation. Upon meeting with GI as an outpatient, she had a scope done through her ileostomy without visualized inflammation. She is pending discussion with a multidisciplinary clinic for management of her Crohn's. Currently, the discussion is whether her recurrent SBOs are from uncontrolled Crohn's vs stricture at her prior anastomosis vs adhesive disease.

## 2025-01-27 NOTE — SUBJECTIVE & OBJECTIVE
No current facility-administered medications on file prior to encounter.     Current Outpatient Medications on File Prior to Encounter   Medication Sig    clonazePAM (KLONOPIN) 1 MG tablet Take 1 tablet (1 mg total) by mouth 2 (two) times daily.    cyclobenzaprine (FLEXERIL) 10 MG tablet Take 1 tablet (10 mg total) by mouth every evening as needed for muscle pain    droNABinol (MARINOL) 10 MG capsule Take 1 capsule (10 mg total) by mouth once daily.    estradiol 0.025 mg/24 hr 0.025 mg/24 hr Place 1 patch onto the skin once a week. CHANGES ON MONDAYS    gabapentin (NEURONTIN) 300 MG capsule Take 1 capsule (300 mg total) by mouth 2 (two) times daily.    loperamide (IMODIUM) 1 mg/7.5 mL solution Take 4 mg by mouth 3 (three) times daily as needed for Diarrhea.    mirtazapine (REMERON SOL-TAB) 30 MG disintegrating tablet Take 1 tablet (30 mg total) by mouth nightly.    multivitamin (THERAGRAN) per tablet Take 1 tablet by mouth once daily.    nadoloL (CORGARD) 40 MG tablet Take 1 tablet (40 mg total) by mouth once daily. Patient needs appointment before next refill.    pantoprazole (PROTONIX) 40 MG tablet Take 1 tablet (40 mg total) by mouth 2 (two) times daily.    promethazine (PHENERGAN) 25 MG tablet Take 1 tablet (25 mg total) by mouth every 6 (six) hours as needed for Nausea.    tamsulosin (FLOMAX) 0.4 mg Cap Take 1 capsule (0.4 mg total) by mouth once daily.    valACYclovir (VALTREX) 500 MG tablet Take 1 tablet (500 mg total) by mouth once daily.    vedolizumab (ENTYVIO) 300 mg SolR injection Inject 300 mg into the vein every 8 weeks.    zolpidem (AMBIEN) 10 mg Tab Take 1 tablet (10 mg total) by mouth every evening.       Review of patient's allergies indicates:   Allergen Reactions    Azathioprine sodium Other (See Comments)     Other reaction(s): pancreatitis  Other reaction(s): pancreatitis    Methotrexate analogues Other (See Comments)     leukopenia    Stelara [ustekinumab] Other (See Comments)     Multiple  infections    Zofran [ondansetron hcl (pf)] Other (See Comments)     Per patient causes prolong QT    Vancomycin analogues Other (See Comments)     Made her red    Azathioprine      Other reaction(s): Unknown    Methotrexate      Other reaction(s): infection-    Morphine Itching and Other (See Comments)     Other reaction(s): Itching    Zofran [ondansetron hcl]      Other reaction(s): Hives    Bactrim [sulfamethoxazole-trimethoprim] Palpitations    Ciprofloxacin Palpitations       Past Medical History:   Diagnosis Date    Abnormal Pap smear 2007    Alkaline phosphatase elevation- based on lab from 4/9/2019 05/28/2019    Arthritis     Avascular necrosis of bone of hip, left     Bacterial vaginosis     Crohn disease     Depression 08/05/2017    Drug-induced pancreatitis (imuran)     Encounter for blood transfusion     Generalized anxiety disorder     Genital HSV     GERD (gastroesophageal reflux disease)     Hypertension     Ileostomy in place 07/09/2012    Kidney stone     Long QT syndrome     Melanoma in situ (excised-buttocks 2018, para-stomal site 2019)     Moderate episode of recurrent major depressive disorder 02/02/2024    Multiple thyroid nodules 11/27/2018    Osteopenia of multiple sites 01/07/2019    Pancreas cyst (tail, 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)     Recurrent Clostridioides difficile diarrhea     Recurrent UTI 04/03/2013     Past Surgical History:   Procedure Laterality Date    ABDOMINAL SURGERY      APPENDECTOMY      ARTHROPLASTY OF SHOULDER Left 7/18/2023    Procedure: ARTHROPLASTY, SHOULDER;  Surgeon: Sharon Babb MD;  Location: [facility];  Service: Orthopedics;  Laterality: Left;    AUGMENTATION OF BREAST Bilateral 06/2022    gel implants    BILATERAL SALPINGO-OOPHORECTOMY (BSO) Bilateral 05/30/2019    Procedure: SALPINGO-OOPHORECTOMY, BILATERAL;  Surgeon: Rupa German MD;  Location: [address];  Service: OB/GYN;  Laterality: Bilateral;    BLADDER SURGERY      partial cystectomy due to fistula    breast  lift      BREAST SURGERY      [facility]      COLON SURGERY      COLONOSCOPY      CYSTOGRAM  12/11/2024    Procedure: CYSTOGRAM;  Surgeon: Lexi Villalba MD;  Location: [facility] OR;  Service: Urology;;    CYSTOSCOPY  09/23/2020    Procedure: CYSTOSCOPY;  Surgeon: Sascha Florentino MD;  Location: [facility] OR 1ST FLR;  Service: Urology;;    CYSTOSCOPY N/A 12/11/2024    Procedure: CYSTOSCOPY;  Surgeon: Lexi Villalba MD;  Location: [facility] OR;  Service: Urology;  Laterality: N/A;    CYSTOSCOPY W/ URETERAL STENT PLACEMENT Left 09/15/2020    Procedure: CYSTOSCOPY, WITH URETERAL STENT INSERTION;  Surgeon: Sascha Florentino MD;  Location: [facility] OR 1ST FLR;  Service: Urology;  Laterality: Left;    DIAGNOSTIC LAPAROSCOPY N/A 07/09/2020    Procedure: LAPAROSCOPY, DIAGNOSTIC;  Surgeon: Gilson El MD;  Location: [facility] OR;  Service: OB/GYN;  Laterality: N/A;    ESOPHAGOGASTRODUODENOSCOPY N/A 1/3/2024    Procedure: EGD (ESOPHAGOGASTRODUODENOSCOPY);  Surgeon: Lily Lind MD;  Location: [facility] ENDO (2ND FLR);  Service: Endoscopy;  Laterality: N/A;    EXCISION OF MELANOMA  07/17/2019    ILEOSCOPY N/A 1/3/2024    Procedure: ILEOSCOPY;  Surgeon: Lily Lind MD;  Location: [facility] ENDO (2ND FLR);  Service: Endoscopy;  Laterality: N/A;    ILEOSCOPY N/A 1/24/2024    Procedure: ILEOSCOPY;  Surgeon: Lilian Navarro MD;  Location: [facility] ENDO (2ND FLR);  Service: Endoscopy;  Laterality: N/A;  through stoma    ILEOSCOPY N/A 6/4/2024    Procedure: ILEOSCOPY;  Surgeon: Peace Garcia MD;  Location: [facility] ENDO (4TH FLR);  Service: Endoscopy;  Laterality: N/A;  Ref By:,kaleb sent via portal,AC  received urgent message to reschedule pt from 7/15  to may or june-updated prep instructions sent-   5/29/24- precall complete - ERW    ILEOSCOPY N/A 12/2/2024    Procedure: ILEOSCOPY;  Surgeon: Peace Garcia MD;  Location: [facility] ([address] [place] [address]);  Service: Endoscopy;  Laterality: N/A;  Ref By:,[place],half  [email or web address]  11/22 lvm for precall-st  11/27/24 attempted precall no answer LVM MARI  11/29-LVM for pre call.  No answe.-JM/ES  11/29-pt lvm confirming appt-KPvt    ILEOSTOMY      INSERTION OF TUNNELED CENTRAL VENOUS CATHETER (CVC) WITH SUBCUTANEOUS PORT Right 9/10/2024    Procedure: INSERTION, SINGLE LUMEN CATHETER WITH PORT, WITH FLUOROSCOPIC GUIDANCE, Rt neck or chest, prefers chest;  Surgeon: Vinh Lloyd MD;  Location: [facility] OR 2ND FLR;  Service: General;  Laterality: Right;    LAPAROSCOPIC LYSIS OF ADHESIONS N/A 07/09/2020    Procedure: LYSIS, ADHESIONS, LAPAROSCOPIC;  Surgeon: Gilson El MD;  Location: [facility] OR;  Service: OB/GYN;  Laterality: N/A;    LASER LITHOTRIPSY  09/23/2020    Procedure: LITHOTRIPSY, USING LASER;  Surgeon: Sascha Florentino MD;  [facility];  Service: Urology;;    LYSIS OF ADHESIONS N/A 05/30/2019    Procedure: LYSIS, ADHESIONS;  Surgeon: Rupa German MD;  [facility];  Service: OB/GYN;  Laterality: N/A;    MEDIPORT REMOVAL Left 9/10/2024    Procedure: REMOVAL, CATHETER, CENTRAL VENOUS, TUNNELED, WITH PORT, Left side;  Surgeon: Vinh Lloyd MD;  [facility];  Service: General;  Laterality: Left;    OOPHORECTOMY Right 04/16/2015    PORTACATH PLACEMENT  02/21/2017    SKIN BIOPSY      SMALL INTESTINE SURGERY      age 18 Y    TOTAL ABDOMINAL HYSTERECTOMY  04/16/2015    TOTAL COLECTOMY      TUBAL LIGATION  06/06/2012    UPPER GASTROINTESTINAL ENDOSCOPY      URETEROSCOPIC REMOVAL OF URETERIC CALCULUS  09/23/2020    Procedure: REMOVAL, CALCULUS, URETER, URETEROSCOPIC;  Surgeon: Sascha Florentino MD;  Location: [facility] OR [facility] FLR;  Service: Urology;;    URETEROSCOPY Left 09/23/2020    Procedure: URETEROSCOPY;  Surgeon: Sascha Florentino MD;  Location: [facility] OR [facility] FLR;  Service: Urology;  Laterality: Left;     Family History       Problem Relation (Age of Onset)    Breast cancer Maternal Cousin (41)    Cancer Maternal Grandfather, Father     Colon cancer Father    Crohn's disease Brother, Daughter    Diabetes Paternal Grandfather, Maternal Grandfather    Endometrial cancer Maternal Aunt    Hearing loss Paternal Grandmother    Heart disease Maternal Grandfather    Hyperlipidemia Father    Hypertension Mother    Liver cancer Father    Skin cancer Maternal Grandfather          Tobacco Use    Smoking status: Never     Passive exposure: Past    Smokeless tobacco: Never   Substance and Sexual Activity    Alcohol use: Not Currently     Alcohol/week: 0.0 standard drinks of alcohol    Drug use: No    Sexual activity: Yes     Partners: Male     Birth control/protection: See Surgical Hx     Comment: HYST     Review of Systems   Constitutional:  Negative for chills and fever.   HENT:  Negative for hearing loss and trouble swallowing.    Eyes:  Negative for discharge and visual disturbance.   Respiratory:  Negative for chest tightness and shortness of breath.    Cardiovascular:  Negative for chest pain and palpitations.   Gastrointestinal:  Positive for abdominal distention, abdominal pain, nausea and vomiting.   Genitourinary:  Negative for difficulty urinating and hematuria.   Musculoskeletal:  Negative for arthralgias and back pain.   Skin:  Negative for rash and wound.   Neurological:  Negative for dizziness and headaches.     Objective:     Vital Signs (Most Recent):  Temp: 98.6 °F (37 °C) (01/27/25 0710)  Pulse: 84 (01/27/25 0710)  Resp: 16 (01/27/25 0831)  BP: 118/68 (01/27/25 0710)  SpO2: 100 % (01/27/25 0710) Vital Signs (24h Range):  Temp:  [97.9 °F (36.6 °C)-98.6 °F (37 °C)] 98.6 °F (37 °C)  Pulse:  [77-97] 84  Resp:  [16-22] 16  SpO2:  [100 %] 100 %  BP: (118-141)/(68-90) 118/68        There is no height or weight on file to calculate BMI.     Physical Exam  Constitutional:       General: She is not in acute distress.     Appearance: Normal appearance.   HENT:      Head: Normocephalic and atraumatic.   Cardiovascular:      Rate and Rhythm: Normal rate  and regular rhythm.   Pulmonary:      Effort: Pulmonary effort is normal. No respiratory distress.   Abdominal:      General: Abdomen is flat. There is no distension.      Palpations: Abdomen is soft.      Tenderness: There is no abdominal tenderness.      Comments: Ileostomy in place without output  Midline incision well healed  Distended  Mildly tender to palpation   Neurological:      General: No focal deficit present.      Mental Status: She is alert and oriented to person, place, and time.   Psychiatric:         Behavior: Behavior normal.         Thought Content: Thought content normal.            I have reviewed all pertinent lab results within the past 24 hours.  CBC:   Recent Labs   Lab 01/27/25  0417 01/27/25  0436   WBC 15.59*  --    RBC 4.71  --    HGB 13.0  --    HCT 39.4 40     --    MCV 84  --    MCH 27.6  --    MCHC 33.0  --      CMP:   Recent Labs   Lab 01/27/25  0417   *   CALCIUM 10.0   ALBUMIN 4.0   PROT 8.7*      K 3.5   CO2 23      BUN 16   CREATININE 0.8   ALKPHOS 155*   ALT 23   AST 28   BILITOT 0.6       Significant Diagnostics:  I have reviewed all pertinent imaging results/findings within the past 24 hours.

## 2025-01-27 NOTE — PROVIDER PROGRESS NOTES - EMERGENCY DEPT.
Encounter Date: 1/27/2025    ED Physician Progress Notes            STAFF PHYSICIAN F/U NOTE:  Ivy Salgado has been evaluated and treated by Dr. Guido. S/O pending CRS admission. Currently, she reports continued nausea and abdominal pain improved with Dilaudid. EKG per my interpretation demonstrates normal sinus rhythm, normal axis and intervals (). No ST elevation noted. Anti-emetics given.     Patient admitted to [facility].    ____________________  Emmett Arellano MD, [facility]  Emergency Medicine Staff  7:48 AM 1/27/2025    Ty Oliva MD  Emergency Medicine Resident

## 2025-01-27 NOTE — CONSULTS
"[facility]  Gastroenterology Inflammatory Bowel Disease Inpatient Service   Consult Note    Patient Name: Ivy Salgado  MRN: [id number]  Admission Date: 1/27/2025  Hospital Length of Stay: 0 days  Code Status: Full Code   Attending Provider: Cristiane Guido MD   Consulting Provider: Shelby Post MD  Primary Care Physician: Luis Madden DO  Principal Problem:Crohn's disease of small intestine with other complication  Consults  Subjective:     HPI: Ivy Salgado is a 44 y.o. female with Crohn's disease with end ileostomy and recurrent ileitis and 3 SBOs in the past year, now with 1 day of progressive abdominal pain, nausea, vomiting, and decreased stoma output. She states it feels like previous SBOs. In the ED, workup revealed an SBO without discrete transition point but dilated proximal and decompressed distal small bowel. CT appears similar to her previous CT at the time of her last SBO in 10/2024. For this, CRS was called for further management. Leukocytosis, CRP pending. Her last clinic visit with Dr. 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 I am concerned that this will likely need surgical intervention." CRS admitting patient for recurrent SBO, GI IBD following for continuity of care.     Her last dose of Entyvio was 12/18 and her next dose is scheduled for 2/13.     IBD History:   Ivy Salgado is a 44 y.o. female with Crohn's disease with end ileostomy and recurrent ileitis, recurrent C diff (2014 x 2), ARPITA/depression, arthritis, h/o abnormal pap smear- LYLA I, nephrolithiasis-nonobstructive, GERD, long QTc syndrome (Type III, on nadolol), s/p SHELLIE (2015) for recurrent ovarian cysts and endometriosis, early evolving melanoma in situ (buttocks and para-stomal site, excised in 2018 and 2019 " respectively), history of genital HSV, imuran induced pancreatitis, pancreatic tail cyst, recurrent UTI (h/o ESBL 2018), multiple thyroid nodules, osteopenia, left femoral head chronic avascular necrosis, history bacterial vaginosis, h/o abnormal LFTs (elevated ALP since 2016), family history of colon cancer.  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  She tried multiple meds including imuran (pancreatitis, remicade (secondary nonresponder), humira (DIL), MTX (leukopenia).  She met Dr. Cameron initially in GI clinic at [facility] 5/2009 at which time she had some diarrhea and on pred 30 mg/d. Colonoscopy 6/2009 significant for diffuse proctosigmoiditis with remainder of colon normal and end to end ileocolonic anastomsis with inflammation. Placed on rowasa enemas but too expensive so switched to steroid enemas.  Flex sig 10/2009 ongoing proctosigmoiditis.  In 2010 she had rectal bleeding that improved with proctofoam and started cimzia with improvement of symptoms.  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  Colonoscopy c/w moderate proctosigmoiditis and in 5/2010 she continued proctofoam BID and took extra dose of cimzia to induce remission.  In 7/2010 flex sig confirmed active left sided colitis and she started prednisone in 3/2011 with repeat colonoscopy 8/2011 c/w ongoing rectosigmoid inflammation with mild mucosal ulcer the transverse colon with ileocolonic anastomotic stricture and normal TI. In 9/2011 pt was on canasa, cimzia and prednisone taper.  In April/May 2012 pt hospitalized and CT c/w sigmoid wall thickening and colonoscopy confirmed distal 30 cm with moderate inflammation with ileocolonic anastomotic  ulcers and normal TI.  In 6/2012 Dr Parsons proceeded with completion proctocolectomy with end ileostomy with pathology confirming transmural active inflammation with abscess c/w Crohn's disease.  Post-operatively she had peristomal ulcer and perineal wounds. In 1/2013 patient had non-healing perineal wound S/P debridement  with steroids and treated with cipro. In 8/2013 she had EUA with debridement and fulguration of non-healing perineal wound.  In 8/2013 pt had epigastric abd pain and EGD c/w benign gastric polyp, labs normal, CT c/w short segment WB thickening involving ileum proximal to the stoma and resolution of right adnexal mass.  In 9/2013 ileoscopy through stoma significant for segment mucosa at 5 cm proximal to stoma mild congested, eroded, ulcerated area of 6-30 cm and mucosal distal is normal. CT A/p 1/2024 c/w 2 separate segments of SB proximal to the ostomy and near staple row in RLQ with mild enhancement and wall thickening c/w mild inflammatory changes and segment of bowel forming ostomy.  SB enteroscopy 2/2014 significant for single 2 mm ulcer in the proximal ileum.  In 2014 she had 2 hospitalizations for high ileostomy output with was treated with prednisone with taper.  In 10/2014 CT showed few mild dilated loops SB in anterior right pelvis and loop with surgical suture line abuts anterior pelvic wall and possible adhesion. In 10/2014 ileoscopy through stomal normal.  In 1/2015 pt had focal segment of mild distal SB dilation and C diff positive and pt treated antibiotics followed by probiotics and resumed cimzia. Dr. Cameron then referred patient to Dr. Parish Ross in 2015 and he mentions recurrent C diff, recurrent SBOs likely related to adhesions. In 2015 she had SHELLIE/BSO with bladder repair and due to this missed one dose of cimzia and then resumed 5/2015. In 1/2016 she had partial SBO due to adhesions and UGI/SBFT and CT did not show fixed obstruction. In 10/2016 she reported to Dr. Ross  postprandial nausea and epigastric pain, weight loss, fatigue, low grade fever and watery stool output and prednisone helped symptoms but not as good response as past. She was off of cimzia 8-9/2016 though sx occurred prior to holding cimzia. Ileoscopy through stoma 10/2016 significant for normal 15 cm of ileum examined. Overall Dr. Ross felt that her symptoms responded well to prednisone and restarting cimzia. She presented to her OV 1/2017 with increased ileostomy output with C diff negative with symptoms ongoing and MRE 5/2017 c/w long segment of diffuse wall thickening and mucosal enhancement involving the distal ileum. CT A/P 6/2017 significant for left ovarian cyst, mild biliary dilation stable, hypodensities and punctate bilateral renal calcifications.  Ileoscopy through stoma 8/2017 c/w normal ileum from stoma to 15-20 cm. CT A/P 11/2017 significant for righ adnexal mass abutting theright external iliac vein and pelvic US recommended, bilateral renal hypodensities and calcifications. MRE 11/2017 c/w mild questionable ileal inflammation and luminal narrowing involving short segment of SB within the right hemipelvis with mild dilation and upstream bowel 2.5 cm with findings concerning for developing stricture. She stopped cimzia 6/2017 and started stelara 7/11/2017 though discontinued in 1/2018 due to rash.  Due to symptoms pt was started in 1/2018 on prednisone 10 mg/d, bentyl. In 2/2018 CT A/P c/w bilateral nonobstructing renal calculi, mild bilateral cortical scarring of lower renal poles. MRE 9/2018 significant for short segment of distal ileum with scattered regions of non uniform wall thickening and possible additional short segment of proximal jejunum with mild wall thickening. In fall 2018 she was placed on entocort though due to fast transit was opening capsule and taking this. CT A/P 12/2018 c/w several bilateral renal hypodensities c/w cysts, nonobstructive bilateral nephrolithiasis, right adnexal  complex cyst. She started entyvio 11/20/18 and due to recurrent UTIs and palpitations (dx QT prolongation and started on beta blocker) so discontinued in 10/27/20. MRE 5/15/19 significant for right adnexal cystic lesion, large left adnexal cystic lesion.  On 5/30/19 patient had exploratory laparotomy with lysis of adhesions, BSO/mass resection. CT A/P 6/2019 significant for nonspecific rim enhancing fluid collection, mild left hydroureteronephrosis. In 2019 there were several mos she held entyvio due to gyn and melanoma surgery. MRE 5/2020 significant for small non enhancing fluid collection within presacral region superior to the vaginal cuff, right sided pelvocaliectasis, bilateral cortical renal cysts, left femoral head chronic avascular necrosis. CT A/P 9/2020 c/w mild left hydrouriteronephrosis due to distal left ureter stone with urothelial thickening and enhancement.  MRE 4/2021 right ovarian cyst likely hemorrhagic cyst.  In 1/2022 patient was placed by Dr. Ross on pantoprazole 40 mg BID. For joint pains and chronic abdominal pain Dr. Ross treated her with gabapentin for several years. In 1/2023 she had multiple intrarenal stones.  Repeat CT 10/2023 small non-obstructing renal stones. She was hospitalized 12/31/23-1/7/24 for high ileostomy output with stool studies neg for infection.  Elevated inflammatory markers (ESR 94, CRP 22.8), stool caprotectin 281. CT A/P showed slow flow through few mid to distal SB loops noting venous vascular engorgement about these loops and wall hyperemia. On 1/3/24 she had EGD c/w mild HP neg gastritis and ileoscopy through stoma with about 6 apthous ulcers in the distal 20 cm of the ileum and biopsies c/w active inflammation.  Pt had elevated LFTs (peaked 1/4/24 ///TB normal).  Hepatology consulted and serological workup negative and subsequent LFTs normalized with no intervention. Abdominal US revealed mildly dilated bile ducts in the central right  hepatic lobe and MRCP c/w no evidence of biliary obstruction, punctate cystic focus in the pancreas tail likely side IPMN and f/u in 1 year recommended.  She was treated with antidiarrheals (imodium helped but lomotil caused palpitations) and IVFs. Lotronex was being considered but due to prolonged QT unable to proceed with this. Due to mild ileitis plan for outpatient entyvio.  Since her discharge from hospital she had continued high ileostomy output and dehydration and we recommended that she return for hospitalization but pt did not wish to go to ER.  She continued with high ileostomy output up to 8 liters/day but if fasting down to 4 liters/day despiate taking imodium 1 tab QID.  She is not taking lomotil due to palpitations.  At her OV 1/16/24 due to high ileostomy output and dehydration I proceeded with hospital admission 1/16/24-1/27/24 at which time she continued on IVFs, antidiarrheals, pain meds and started on IV solumedrol with repeat stool calprotectin done on 1/16/24 93.9 though unclear accuracy given some granulation tissue on stoma could influence this test. On 1/24/24 stool calprotectin 117.6, ileoscopy through stoma with one small apthous ulcer 19 cm proximal to stoma. She had stool studies neg for infection and then discharged home on liquid imodium, pred 40 mg with taper and plan to start entyvio. She restarted entyvio with the re induction doses on 1/30/2024 then resumed every 8 weeks infusions and by her office visit in 3/2024 patient no longer needed IVFs and was recovering well from the hospital stay. She reported recurrent UTIs with entyvio in the past and was advised to closely monitor her symptoms and since then has had recurrent UTI but overall I fel this was highly unusual given MOA of entyvio.  In 4/2024 pt hospitalized for abd pain and decreased ileostomy output and CT A/P significant for SBO with transition point seen in the midline upper pelvis. The small bowel anastomosis and also  ileostomy site are patent and do not appear to be source for suspected obstruction. This was managed conservatively and followed by MRE 5/2024 significant for short segmented stricture with mild active inflammation adjacent to ehr RLQ surgical anastomosis with tethering and distortion. Ileoscopy through stoma 6/4/24 significant of neoterminal ileum immediately proximal to the stoma normal.     IBD Details:  - current IBD meds:  Entyvio 300 mg IV q 8 weeks (11/20/18- 10/27/20- stopped due to UTIs and palpitations, reinduced 1/30/24, LD 12/18, ND 2/12)  - other medications: liquid imodium PRN; phenergan PRN for nausea ~2x/week  - ileostomy output: 3-4 full bags, variable consistency liquid to soft; no abdominal pain  - 10/2024- hospitalization for SBO with CT A/P significant for multiple fluid-air filled loops of dilated small bowel in the right hemiabdomen and pelvis with suspected narrowing at a surgical anastomosis.  No definite transition point elsewhere.  Findings may represent small bowel obstruction.  A 4 cm segment of the right lower quadrant small bowel demonstrates findings suspicious for active segmental enteritis and/or small bowel stricture. This was managed conservatively with resolution.  - 11/7/24 MRE:  Several small renal cysts noted.  RLQ chance-anastomotic SB with thickening, transmural hyperenhancement with minimal restricted diffusion involving 4.3 cm of bowel, outpouching at site of SB anastomosis.  - 12/2/24 ileoscopy through stoma: There was evidence of a patent end ileostomy found in the terminal ileum. This was characterized by healthy appearing mucosa. he harvey-terminal ileum appeared normal. Biopsies ileum normal  - 12/4/24 MRI abdomen: Stable punctate cystic focus in the pancreatic tail with probable communication with the main pancreatic duct likely representing a side branch IPMN. Too small to characterize without suspicious features. Continued follow-up in 1 year with MRI MRCP with and  without contrast  - 24 cystoscopy:  Normal urethra, no visible fistulae within bladder, 3 small saccules on left lateral wall, presumably from previous colovesical fistula repair with scar tissue, cystogram with normal contour, no contrast extravasation nor fistula present.   - NSAID use: No  - Narcotic use: No  - Alternative/complementary meds for IBD: No    Prior pertinent Surgeries:   surgery with SB and 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    Prior pertinent Endoscopy/Imagin23 CT A/P slow flow through few mid to distal SB loops noting venous vascular engorgement about these loops and wall hyperemia  1/3/24 EGD: normal except for gastric erythema, bx c/w mild HP neg gastritis   1/3/24 ileoscopy through stoma:  6 apthous ulcers in the distal 20 cm of the ileum and biopsies c/w active inflammation  1/3/24 abd US:  mildly dilated bile ducts in the central right hepatic lobe   24 MRCP c/w no evidence of biliary obstruction, punctate cystic focus in the pancreas tail likely side IPMN  24 MRE: short-segmented stricture with signs of mild active inflammation adjacent to the RLQ surgical anastomosis with tethering and distortion makes an underlying fistula difficult to exclude.   24 Ileoscopy: Patent end ileostomy with healthy appearing mucosa in the terminal ileum. The examined portion of the ileum was normal. Biopsies normal  10/2024:  CT A/P significant for multiple fluid-air filled loops of dilated small bowel in the right hemiabdomen and pelvis with suspected narrowing at a surgical anastomosis.  No definite transition point elsewhere.  Findings may represent  small bowel obstruction.  A 4 cm segment of the right lower quadrant small bowel demonstrates findings suspicious for active segmental enteritis and/or small bowel stricture. This was managed conservatively with resolution.  11/7/24 MRE:  Several small renal cysts noted.  RLQ chance-anastomotic SB with thickening, transmural hyperenhancement with minimal restricted diffusion involving 4.3 cm of bowel, outpouching at site of SB anastomosis.  12/4/24 MRI abdomen: Stable punctate cystic focus in the pancreatic tail with probable communication with the main pancreatic duct likely representing a side branch IPMN. Too small to characterize without suspicious features. Continued follow-up in 1 year with MRI MRCP with and without contrast  2/11/24 cystoscopy:  Normal urethra, no visible fistulae within bladder, 3 small saccules on left lateral wall, presumably from previous colovesical fistula repair with scar tissue, cystogram with normal contour, no contrast extravasation nor fistula present.       Prior IBD Therapies:  Proctofoam - partially effective  imuran (pancreatitis)  MTX (leukopenia)  Remicade- secondary nonresponder  Humira- discontinued due to drug induced lupus due to joint pains  Entocort- fall 2018, broke open capsule when taking- not sure if effective   Cimzia 3560-2084- secondary nonresponder  Stelara (7/11/17-1/2018)- discontinued due to rash  Canasa ineffective   Prednisone- effective- last took 1-2/2024    Therapeutic Drug Monitoring:  None     Past Medical History:   Diagnosis Date    Abnormal Pap smear 2007    Alkaline phosphatase elevation- based on lab from 4/9/2019 05/28/2019    Arthritis     Avascular necrosis of bone of hip, left     Bacterial vaginosis     Crohn disease     Depression 08/05/2017    Drug-induced pancreatitis (imuran)     Encounter for blood transfusion     Generalized anxiety disorder     Genital HSV     GERD (gastroesophageal reflux disease)     Hypertension     Ileostomy in  place 07/09/2012    Kidney stone     Long QT syndrome     Melanoma in situ (excised-buttocks 2018, para-stomal site 2019)     Moderate episode of recurrent major depressive disorder 02/02/2024    Multiple thyroid nodules 11/27/2018    Osteopenia of multiple sites 01/07/2019    Pancreas cyst (tail, 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)     Recurrent Clostridioides difficile diarrhea     Recurrent UTI 04/03/2013     Past Surgical History:   Procedure Laterality Date    ABDOMINAL SURGERY      APPENDECTOMY      ARTHROPLASTY OF SHOULDER Left 7/18/2023    Procedure: ARTHROPLASTY, SHOULDER;  Surgeon: Sharon Babb MD;  Location: [facility] OR;  Service: Orthopedics;  Laterality: Left;    AUGMENTATION OF BREAST Bilateral 06/2022    gel implants    BILATERAL SALPINGO-OOPHORECTOMY (BSO) Bilateral 05/30/2019    Procedure: SALPINGO-OOPHORECTOMY, BILATERAL;  Surgeon: Rupa German MD;  Location: [facility] OR 2ND FLR;  Service: OB/GYN;  Laterality: Bilateral;    BLADDER SURGERY      partial cystectomy due to fistula    breast lift      BREAST SURGERY      [facility]      COLON SURGERY      COLONOSCOPY      CYSTOGRAM  12/11/2024    Procedure: CYSTOGRAM;  Surgeon: Lexi Villalba MD;  Location: [facility] OR;  Service: Urology;;    CYSTOSCOPY  09/23/2020    Procedure: CYSTOSCOPY;  Surgeon: Sascha Florentino MD;  Location: [facility] OR 1ST FLR;  Service: Urology;;    CYSTOSCOPY N/A 12/11/2024    Procedure: CYSTOSCOPY;  Surgeon: Lexi Villalba MD;  Location: [facility] OR;  Service: Urology;  Laterality: N/A;    CYSTOSCOPY W/ URETERAL STENT PLACEMENT Left 09/15/2020    Procedure: CYSTOSCOPY, WITH URETERAL STENT INSERTION;  Surgeon: Sascha Florentino MD;  Location: [facility] OR 1ST FLR;  Service: Urology;  Laterality: Left;    DIAGNOSTIC LAPAROSCOPY N/A 07/09/2020    Procedure: LAPAROSCOPY, DIAGNOSTIC;  Surgeon: Gilson El MD;  Location: [facility] OR;  Service: OB/GYN;  Laterality: N/A;    ESOPHAGOGASTRODUODENOSCOPY N/A 1/3/2024    Procedure: EGD (ESOPHAGOGASTRODUODENOSCOPY);   Surgeon: Lily Lind MD;  Location: [facility] ENDO (2ND FLR);  Service: Endoscopy;  Laterality: N/A;    EXCISION OF MELANOMA  07/17/2019    ILEOSCOPY N/A 1/3/2024    Procedure: ILEOSCOPY;  Surgeon: Lily Lind MD;  Location: [facility] ENDO (2ND FLR);  Service: Endoscopy;  Laterality: N/A;    ILEOSCOPY N/A 1/24/2024    Procedure: ILEOSCOPY;  Surgeon: Lilian Navarro MD;  Location: [facility] ENDO (2ND FLR);  Service: Endoscopy;  Laterality: N/A;  through stoma    ILEOSCOPY N/A 6/4/2024    Procedure: ILEOSCOPY;  Surgeon: Peace Garcia MD;  Location: [facility] ENDO (4TH FLR);  Service: Endoscopy;  Laterality: N/A;  Ref By:,[facility] sent via portal,  received urgent message to reschedule pt from 7/15  to may or june-updated prep instructions sent-   5/29/24- precall complete - ERW    ILEOSCOPY N/A 12/2/2024    Procedure: ILEOSCOPY;  Surgeon: Peace Garcia MD;  Location: [facility] ENDO (4TH FLR);  Service: Endoscopy;  Laterality: N/A;  Ref By:,[place],half miralax.  11/22 lvm for precall-st  11/27/24 attempted precall no answer LVM MARI  11/29-LVM for pre call.  No answe.-/ES  11/29-pt lvm confirming appt-KPvt    ILEOSTOMY      INSERTION OF TUNNELED CENTRAL VENOUS CATHETER (CVC) WITH SUBCUTANEOUS PORT Right 9/10/2024    Procedure: INSERTION, SINGLE LUMEN CATHETER WITH PORT, WITH FLUOROSCOPIC GUIDANCE, Rt neck or chest, prefers chest;  Surgeon: Vinh Lloyd MD;  Location: [facility] OR 2ND FLR;  Service: General;  Laterality: Right;    LAPAROSCOPIC LYSIS OF ADHESIONS N/A 07/09/2020    Procedure: LYSIS, ADHESIONS, LAPAROSCOPIC;  Surgeon: Gilson El MD;  Location: [facility] OR;  Service: OB/GYN;  Laterality: N/A;    LASER LITHOTRIPSY  09/23/2020    Procedure: LITHOTRIPSY, USING LASER;  Surgeon: Sascha Florentino MD;  Location: [facility] OR 1ST FLR;  Service: Urology;;    LYSIS OF ADHESIONS N/A 05/30/2019    Procedure: LYSIS, ADHESIONS;  Surgeon: Rupa German MD;  Location: [facility] OR 2ND [facility];  Service:  OB/GYN;  Laterality: N/A;    MEDIPORT REMOVAL Left 9/10/2024    Procedure: REMOVAL, CATHETER, CENTRAL VENOUS, TUNNELED, WITH PORT, Left side;  Surgeon: Vinh Lloyd MD;  Location: [facility] OR 2ND FLR;  Service: General;  Laterality: Left;    OOPHORECTOMY Right 04/16/2015    PORTACATH PLACEMENT  02/21/2017    SKIN BIOPSY      SMALL INTESTINE SURGERY      age 18 Y    TOTAL ABDOMINAL HYSTERECTOMY  04/16/2015    TOTAL COLECTOMY      TUBAL LIGATION  06/06/2012    UPPER GASTROINTESTINAL ENDOSCOPY      URETEROSCOPIC REMOVAL OF URETERIC CALCULUS  09/23/2020    Procedure: REMOVAL, CALCULUS, URETER, URETEROSCOPIC;  Surgeon: Sascha Florentino MD;  Location: [facility] OR 1ST FLR;  Service: Urology;;    URETEROSCOPY Left 09/23/2020    Procedure: URETEROSCOPY;  Surgeon: Sascha Florentino MD;  Location: [facility] OR 1ST FLR;  Service: Urology;  Laterality: Left;     Family History   Problem Relation Name Age of Onset    Diabetes Paternal Grandfather Stephen     Hearing loss Paternal Grandmother Viviane     Cancer Maternal Grandfather Philippe         Skin    Skin cancer Maternal Grandfather Philippe     Diabetes Maternal Grandfather Philippe     Heart disease Maternal Grandfather Philippe     Colon cancer Father Milan     Cancer Father Milan         Colon    Liver cancer Father Milan     Hyperlipidemia Father Milan     Hypertension Mother Shaila     Crohn's disease Brother      Endometrial cancer Maternal Aunt      Breast cancer Maternal Cousin  41    Crohn's disease Daughter      Ovarian cancer Neg Hx      Melanoma Neg Hx       Social History     Socioeconomic History    Marital status: Single   Occupational History     Employer: [employer]   Tobacco Use    Smoking status: Never     Passive exposure: Past    Smokeless tobacco: Never   Substance and Sexual Activity    Alcohol use: Not Currently     Alcohol/week: 0.0 standard drinks of alcohol    Drug use: No    Sexual activity: Yes     Partners: Male     Birth  control/protection: See Surgical Hx     Comment: HYST   Other Topics Concern    Are you pregnant or think you may be? No    Breast-feeding No     Social Drivers of Health     Financial Resource Strain: High Risk (11/19/2024)    Overall Financial Resource Strain (CARDIA)     Difficulty of Paying Living Expenses: Hard   Food Insecurity: Food Insecurity Present (11/19/2024)    Hunger Vital Sign     Worried About Running Out of Food in the Last Year: Sometimes true     Ran Out of Food in the Last Year: Sometimes true   Transportation Needs: No Transportation Needs (11/6/2024)    PRAPARE - Transportation     Lack of Transportation (Medical): No     Lack of Transportation (Non-Medical): No   Physical Activity: Insufficiently Active (11/19/2024)    Exercise Vital Sign     Days of Exercise per Week: 3 days     Minutes of Exercise per Session: 40 min   Stress: Stress Concern Present (11/19/2024)    [employer] - Occupational Stress Questionnaire     Feeling of Stress : Rather much   Housing Stability: High Risk (11/19/2024)    Housing Stability Vital Sign     Unable to Pay for Housing in the Last Year: Yes     Homeless in the Last Year: No     Scheduled Meds:   pyridoxine (vitamin B6) (B-6) 50 mg in 0.9% NaCl 50 mL IVPB  50 mg Intravenous Daily     Continuous Infusions:   lactated ringers   Intravenous Continuous 100 mL/hr at 01/27/25 1105 New Bag at 01/27/25 1105     PRN Meds:.  Current Facility-Administered Medications:     HYDROmorphone, 0.5 mg, Intravenous, Q4H PRN    prochlorperazine, 5 mg, Intravenous, Q6H PRN    sodium chloride 0.9%, 10 mL, Intravenous, PRN  Review of patient's allergies indicates:   Allergen Reactions    Azathioprine sodium Other (See Comments)     Other reaction(s): pancreatitis  Other reaction(s): pancreatitis    Methotrexate analogues Other (See Comments)     leukopenia    Stelara [ustekinumab] Other (See Comments)     Multiple infections    Zofran [ondansetron hcl  "(pf)] Other (See Comments)     Per patient causes prolong QT    Vancomycin analogues Other (See Comments)     Made her red    Azathioprine      Other reaction(s): Unknown    Methotrexate      Other reaction(s): infection-    Morphine Itching and Other (See Comments)     Other reaction(s): Itching    Zofran [ondansetron hcl]      Other reaction(s): Hives    Bactrim [sulfamethoxazole-trimethoprim] Palpitations    Ciprofloxacin Palpitations     ROS - negative except for otherwise stated in HPI      Objective:   /73   Pulse 70   Temp 98 °F (36.7 °C) (Oral)   Resp 18   LMP 03/30/2015   SpO2 100%   Breastfeeding No     Physical Exam:  Constitutional:  not in acute distress and well developed  HENT: Head: Normal, normocephalic.  Eyes: conjunctiva clear and sclera nonicteric  GI: soft, distended, ostomy bag with minimal output.   Skin: normal color on visualized skin  Neurological: alert, oriented x3  Psychiatric: mood and affect are within normal limits, pt is a good historian; no memory problems were noted    Assessment/Plan:   Ivy Salgado is a 44 y.o. female with Crohn's disease with end ileostomy and recurrent ileitis and 3 SBOs in the past year, now with 1 day of progressive abdominal pain, nausea, vomiting, and decreased stoma output. CT appears similar to her previous CT at the time of her last SBO in 10/2024. CRS admitted patient for management of SBO.     Her last dose of Entyvio was 12/18 and her next dose is scheduled for 2/13.     Impression:  Her last clinic visit with Dr. 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 I am concerned that this will likely need surgical intervention."     CRP here 1.3. Has had mild ileitis that was accessible by scope through stoma that has resolved on entyvio in the past, appears to have separate " area from prior surgeries that may be culprit of recurrent SBO.     Problem List:  Crohn's disease with end ileostomy, recurrent ileitis and recurrent SBO     Recommendations:  - Appreciate CRS management of SBO  - Will adjust timing of next Entyvio infusion if patient requires surgery  - Avoid NSAIDs given risk of IBD exacerbation  - DVT prophylaxis- IBD pts at 3-4 fold higher likelihood of DVT, DVT prophylaxis- lovenox ordered, MAR reviewed   - Narcotics increase risk of infection along with morbidity/mortality in IBD patients, tylenol preferred and if pain not controlled with tylenol then short-acting morphine preferred    Thank you for involving us in the care of Ivy Salgado. Please call with any additional questions, concerns or changes in the patient's clinical status.     Shelby Post  Gastroenterology Fellow PGY   [facility]

## 2025-01-28 ENCOUNTER — PATIENT MESSAGE (OUTPATIENT)
Dept: DERMATOLOGY | Facility: CLINIC | Age: 45
End: 2025-01-28

## 2025-01-28 LAB
ANION GAP SERPL CALC-SCNC: 9 MMOL/L (ref 8–16)
BUN SERPL-MCNC: 9 MG/DL (ref 6–20)
CALCIUM SERPL-MCNC: 9.2 MG/DL (ref 8.7–10.5)
CHLORIDE SERPL-SCNC: 106 MMOL/L (ref 95–110)
CO2 SERPL-SCNC: 26 MMOL/L (ref 23–29)
CREAT SERPL-MCNC: 0.7 MG/DL (ref 0.5–1.4)
EST. GFR  (NO RACE VARIABLE): >60 ML/MIN/1.73 M^2
GLUCOSE SERPL-MCNC: 82 MG/DL (ref 70–110)
MAGNESIUM SERPL-MCNC: 1.8 MG/DL (ref 1.6–2.6)
PHOSPHATE SERPL-MCNC: 3.3 MG/DL (ref 2.7–4.5)
POTASSIUM SERPL-SCNC: 3.8 MMOL/L (ref 3.5–5.1)
SODIUM SERPL-SCNC: 141 MMOL/L (ref 136–145)

## 2025-01-28 PROCEDURE — 63600175 PHARM REV CODE 636 W HCPCS: Performed by: STUDENT IN AN ORGANIZED HEALTH CARE EDUCATION/TRAINING PROGRAM

## 2025-01-28 PROCEDURE — 20600001 HC STEP DOWN PRIVATE ROOM

## 2025-01-28 PROCEDURE — 80048 BASIC METABOLIC PNL TOTAL CA: CPT | Performed by: STUDENT IN AN ORGANIZED HEALTH CARE EDUCATION/TRAINING PROGRAM

## 2025-01-28 PROCEDURE — 63600175 PHARM REV CODE 636 W HCPCS: Performed by: EMERGENCY MEDICINE

## 2025-01-28 PROCEDURE — 84100 ASSAY OF PHOSPHORUS: CPT | Performed by: STUDENT IN AN ORGANIZED HEALTH CARE EDUCATION/TRAINING PROGRAM

## 2025-01-28 PROCEDURE — 83735 ASSAY OF MAGNESIUM: CPT | Performed by: STUDENT IN AN ORGANIZED HEALTH CARE EDUCATION/TRAINING PROGRAM

## 2025-01-28 PROCEDURE — 63600175 PHARM REV CODE 636 W HCPCS: Mod: JZ,TB | Performed by: STUDENT IN AN ORGANIZED HEALTH CARE EDUCATION/TRAINING PROGRAM

## 2025-01-28 PROCEDURE — 36415 COLL VENOUS BLD VENIPUNCTURE: CPT | Performed by: STUDENT IN AN ORGANIZED HEALTH CARE EDUCATION/TRAINING PROGRAM

## 2025-01-28 PROCEDURE — 25000003 PHARM REV CODE 250: Performed by: EMERGENCY MEDICINE

## 2025-01-28 PROCEDURE — 25500020 PHARM REV CODE 255: Performed by: STUDENT IN AN ORGANIZED HEALTH CARE EDUCATION/TRAINING PROGRAM

## 2025-01-28 RX ORDER — SODIUM CHLORIDE, SODIUM LACTATE, POTASSIUM CHLORIDE, CALCIUM CHLORIDE 600; 310; 30; 20 MG/100ML; MG/100ML; MG/100ML; MG/100ML
INJECTION, SOLUTION INTRAVENOUS CONTINUOUS
Status: DISCONTINUED | OUTPATIENT
Start: 2025-01-28 | End: 2025-01-29

## 2025-01-28 RX ORDER — ENOXAPARIN SODIUM 100 MG/ML
40 INJECTION SUBCUTANEOUS EVERY 24 HOURS
Status: DISCONTINUED | OUTPATIENT
Start: 2025-01-28 | End: 2025-01-29 | Stop reason: HOSPADM

## 2025-01-28 RX ORDER — DEXTROSE MONOHYDRATE AND SODIUM CHLORIDE 5; .9 G/100ML; G/100ML
INJECTION, SOLUTION INTRAVENOUS CONTINUOUS
Status: DISCONTINUED | OUTPATIENT
Start: 2025-01-28 | End: 2025-01-28

## 2025-01-28 RX ORDER — ACETAMINOPHEN 10 MG/ML
1000 INJECTION, SOLUTION INTRAVENOUS EVERY 8 HOURS
Status: COMPLETED | OUTPATIENT
Start: 2025-01-28 | End: 2025-01-29

## 2025-01-28 RX ORDER — ACETAMINOPHEN 500 MG
1000 TABLET ORAL EVERY 8 HOURS
Status: DISCONTINUED | OUTPATIENT
Start: 2025-01-29 | End: 2025-01-29 | Stop reason: HOSPADM

## 2025-01-28 RX ADMIN — DIATRIZOATE MEGLUMINE AND DIATRIZOATE SODIUM 50 ML: 660; 100 LIQUID ORAL; RECTAL at 08:01

## 2025-01-28 RX ADMIN — ENOXAPARIN SODIUM 40 MG: 40 INJECTION SUBCUTANEOUS at 04:01

## 2025-01-28 RX ADMIN — HYDROMORPHONE HYDROCHLORIDE 0.5 MG: 1 INJECTION, SOLUTION INTRAMUSCULAR; INTRAVENOUS; SUBCUTANEOUS at 08:01

## 2025-01-28 RX ADMIN — HYDROMORPHONE HYDROCHLORIDE 0.5 MG: 1 INJECTION, SOLUTION INTRAMUSCULAR; INTRAVENOUS; SUBCUTANEOUS at 04:01

## 2025-01-28 RX ADMIN — SODIUM CHLORIDE, POTASSIUM CHLORIDE, SODIUM LACTATE AND CALCIUM CHLORIDE: 600; 310; 30; 20 INJECTION, SOLUTION INTRAVENOUS at 10:01

## 2025-01-28 RX ADMIN — PROCHLORPERAZINE EDISYLATE 5 MG: 5 INJECTION INTRAMUSCULAR; INTRAVENOUS at 10:01

## 2025-01-28 RX ADMIN — ACETAMINOPHEN 1000 MG: 10 INJECTION, SOLUTION INTRAVENOUS at 10:01

## 2025-01-28 RX ADMIN — PYRIDOXINE HYDROCHLORIDE 50 MG: 100 INJECTION, SOLUTION INTRAMUSCULAR; INTRAVENOUS at 09:01

## 2025-01-28 RX ADMIN — HYDROMORPHONE HYDROCHLORIDE 0.5 MG: 1 INJECTION, SOLUTION INTRAMUSCULAR; INTRAVENOUS; SUBCUTANEOUS at 12:01

## 2025-01-28 RX ADMIN — ACETAMINOPHEN 1000 MG: 10 INJECTION, SOLUTION INTRAVENOUS at 12:01

## 2025-01-28 NOTE — PROGRESS NOTES
[facility]  Colorectal Surgery  Progress Note    Patient Name: Ivy Salgado  MRN: [id number]  Admission Date: 1/27/2025  Hospital Length of Stay: 1 days  Attending Physician: DUNCAN Campbell MD    Subjective:     Interval History: had some abd pain during nite, for gastrograffin challenge this am, little ileostomy output    Post-Op Info:  * No surgery found *          Medications:  Continuous Infusions:   lactated ringers   Intravenous Continuous 75 mL/hr at 01/28/25 1042 New Bag at 01/28/25 1042     Scheduled Meds:   pyridoxine (vitamin B6) (B-6) 50 mg in 0.9% NaCl 50 mL IVPB  50 mg Intravenous Daily     PRN Meds:   pyridoxine (vitamin B6) (B-6) 50 mg in 0.9% NaCl 50 mL IVPB        Objective:     Vital Signs (Most Recent):  Temp: 97.8 °F (36.6 °C) (01/28/25 1143)  Pulse: 82 (01/28/25 1143)  Resp: 18 (01/28/25 1143)  BP: 102/62 (01/28/25 1143)  SpO2: 100 % (01/28/25 1143) Vital Signs (24h Range):  Temp:  [97.8 °F (36.6 °C)-98.9 °F (37.2 °C)] 97.8 °F (36.6 °C)  Pulse:  [64-86] 82  Resp:  [12-18] 18  SpO2:  [95 %-100 %] 100 %  BP: ()/(62-83) 102/62     Intake/Output - Last 3 Shifts         01/26 0700  01/27 0659 01/27 0700  01/28 0659 01/28 0700  01/29 0659    I.V. (mL/kg)  1009.6 (18.7)     IV Piggyback  44.5     Total Intake(mL/kg)  1054.2 (19.5)     Net  +1054.2                     Physical Exam  Vitals and nursing note reviewed.   Constitutional:       Appearance: She is well-developed.   HENT:      Head: Normocephalic.   Eyes:      Pupils: Pupils are equal, round, and reactive to light.   Cardiovascular:      Rate and Rhythm: Normal rate and regular rhythm.      Heart sounds: Normal heart sounds.   Pulmonary:      Effort: Pulmonary effort is normal. No respiratory distress.      Breath sounds: Normal breath sounds. No wheezing or rales.   Abdominal:      General: There is distension.      Palpations: Abdomen is soft. There is no mass.      Tenderness: There is no guarding or rebound.       Comments: Ileostomy little output   Skin:     General: Skin is warm and dry.   Neurological:      Mental Status: She is alert and oriented to person, place, and time.                Significant Labs:  BMP (Last 3 Results):   Recent Labs   Lab 01/27/25  0417 01/28/25  0853   * 82    141   K 3.5 3.8    106   CO2 23 26   BUN 16 9   CREATININE 0.8 0.7   CALCIUM 10.0 9.2   MG  --  1.8     CBC (Last 3 Results):   Recent Labs   Lab 01/27/25 0417 01/27/25  0436   WBC 15.59*  --    RBC 4.71  --    HGB 13.0  --    HCT 39.4 40     --    MCV 84  --    MCH 27.6  --    MCHC 33.0  --        Significant Diagnostics:  Gastrograffin challenge  Assessment/Plan:     Small bowel obstruction  Ivy Salgado si a 44 y.o. lady with Crohn's s/p TAC and end ileostomy and has recurrent SBOs. She comes in for another SBO     - admit to colorectal surgery  - NPO  - NG tube. She is refusing at this time, she understands the risks associated  - last time, she resolved without an NG tube and had a gastrograffin challenge a couple of days later  - mIVF  - discussed that if she failed a GGC, then she may need a lap vs open lysis of adhesions, GGC in progress          Vania Moeller, WAYNE  Colorectal Surgery  [facility]

## 2025-01-28 NOTE — CONSULTS
Consult addressed, please see consult note for additional details.     Shelby Post MD  Gastroenterology Fellow PGY-IV  [facility]

## 2025-01-28 NOTE — ASSESSMENT & PLAN NOTE
Ivy Salgado si a 44 y.o. lady with Crohn's s/p TAC and end ileostomy and has recurrent SBOs. She comes in for another SBO     - admit to colorectal surgery  - NPO  - NG tube. She is refusing at this time, she understands the risks associated  - last time, she resolved without an NG tube and had a gastrograffin challenge a couple of days later  - mIVF  - discussed that if she failed a GGC, then she may need a lap vs open lysis of adhesions, GGC in progress

## 2025-01-28 NOTE — PLAN OF CARE
Problem: Adult Inpatient Plan of Care  Goal: Plan of Care Review  Outcome: Progressing  Goal: Absence of Hospital-Acquired Illness or Injury  Outcome: Progressing  Goal: Optimal Comfort and Wellbeing  Outcome: Progressing     Problem: Intestinal Obstruction  Goal: Optimal Pain Control and Function  Outcome: Progressing     Problem: Nausea and Vomiting  Goal: Nausea and Vomiting Relief  Outcome: Progressing     Problem: Ileostomy  Goal: Optimal Coping  Outcome: Progressing  Goal: Effective Urinary Elimination  Outcome: Progressing  Goal: Effective Oxygenation and Ventilation  Outcome: Progressing  Goal: Optimal Stoma Healing  Outcome: Progressing     Problem: Pain Acute  Goal: Optimal Pain Control and Function  Outcome: Progressing

## 2025-01-28 NOTE — PLAN OF CARE
POC :      - Received patient from ED via wheelchair   - aox4, independent   - Right chest port accessed in ED   - LR at 100 ml/hr   - RLQ ileostomy with thin liquid output   - L chest Holter monitor.   - skin assessment done with MIKE Souza   - Educate fall precautions, call light within reach.      Problem: Adult Inpatient Plan of Care  Goal: Plan of Care Review  Outcome: Progressing  Goal: Absence of Hospital-Acquired Illness or Injury  Outcome: Progressing  Goal: Optimal Comfort and Wellbeing  Outcome: Progressing     Problem: Intestinal Obstruction  Goal: Optimal Bowel Function  Outcome: Progressing  Goal: Optimal Pain Control and Function  Outcome: Progressing     Problem: Nausea and Vomiting  Goal: Nausea and Vomiting Relief  Outcome: Progressing     Problem: Ileostomy  Goal: Optimal Coping  Outcome: Progressing  Goal: Optimal Pain Control and Function  Outcome: Progressing  Goal: Nausea and Vomiting Relief  Outcome: Progressing     Problem: Pain Acute  Goal: Optimal Pain Control and Function  Outcome: Progressing

## 2025-01-28 NOTE — PLAN OF CARE
[facility]  Initial Discharge Assessment       Primary Care Provider: Luis Madden DO    Admission Diagnosis: Hx of prolonged Q-T interval on ECG [Z87.898]  Abdominal pain, unspecified abdominal location [R10.9]  History of creation of ostomy [Z93.9]  Leukocytosis, unspecified type [D72.829]  Vomiting, unspecified vomiting type, unspecified whether nausea present [R11.10]    Admission Date: 1/27/2025  Expected Discharge Date:     Transition of Care Barriers: None    Payor: BLUE CROSS [employer] BENEFIT / Plan: [employer] [place] [place] / Product Type: Self Funded /     Extended Emergency Contact Information  Primary Emergency Contact: SalgadoHao flowerMilan  Address: [address] [place] [address]  Home Phone: [phone]  Mobile Phone: [phone]  Relation: Father   needed? No  Secondary Emergency Contact: Shaila Salgado   [place]  Mobile Phone: [phone]  Relation: Mother    Discharge Plan A: Home with family, Home  Discharge Plan B: Home with family      [facility] [address] [address]  Phone: [phone] Fax: [phone]    [facility] [address] [address]  Phone: [phone] Fax: [phone]      Initial Assessment (most recent)       Adult Discharge Assessment - 01/28/25 1552          Discharge Assessment    Assessment Type Discharge Planning Assessment     Confirmed/corrected address, phone number and insurance Yes     Confirmed Demographics Correct on Facesheet     Source of Information patient     If unable to respond/provide information was family/caregiver contacted? --   Father- Milan Salgado- [phone], Mother- Shaila Salgado- [phone]    Communicated CHRISTOPH with patient/caregiver Yes     Reason For Admission Hx of prolomged Q-T     People in Home child(katalina), dependent     Facility Arrived From: Home     Do you expect to return to  your current living situation? Yes     Do you have help at home or someone to help you manage your care at home? Yes     Who are your caregiver(s) and their phone number(s)? Father- Milan Salgado- [phone], Mother- Shaila Roa [phone]     Prior to hospitilization cognitive status: No Deficits;Alert/Oriented     Current cognitive status: Alert/Oriented;No Deficits     Walking or Climbing Stairs Difficulty no     Dressing/Bathing Difficulty no     Do you have any problems with: --   Pt denies    Home Accessibility stairs to enter home;stairs within home     Stairs, Within Home, Primary twelve     Number of Stairs, Within Home, Primary one     Surface of Stairs, Within Home, Primary hardwood;carpeting     Stair Railings, Within Home, Primary railings safe and in good condition     Landing, Stairs, Within Home, Primary railings present     Stairs Comment, Within Home, Primary 12     Number of Stairs, Main Entrance one     Stair Railings, Main Entrance none     Landing, Stairs, Main Entrance no railings     Stairs Comment, Main Entrance one     Home Layout Bedroom on 2nd floor;Bathroom on 2nd floor     Equipment Currently Used at Home colostomy/ostomy supplies     Readmission within 30 days? No     Patient currently being followed by outpatient case management? No     Do you currently have service(s) that help you manage your care at home? No     Do you take prescription medications? Yes     Do you have prescription coverage? Yes     Coverage BLUE CROSS [employer] EM[employer] BENEFIT - [employer] [place] -     Do you have any problems affording any of your prescribed medications? No     Is the patient taking medications as prescribed? yes     Who is going to help you get home at discharge? Father- Milan oRa [phone], Mother- Shaila Roa [phone]     How do you get to doctors appointments? car, drives self;family or friend will provide     Are you on dialysis? No     Do you take coumadin? No      Discharge Plan A Home with family;Home     Discharge Plan B Home with family     DME Needed Upon Discharge  other (see comments)   TBD    Discharge Plan discussed with: Patient     Transition of Care Barriers None        OTHER    Name(s) of People in Home Daughter                     VI spoke with patient in 1025 for Discharge Planning Assessment. Per patient, Pt lives daughter (depended)  in a two story family home on a slab foundation with one steps to porch and point of entry.  Patient was independent with ADLS and DID use DME or in-home assistive equipment. Pt is not on dialysis  or coumadin,  takes medications as prescribed / keeps refilled / has resources for all daily and prescriptive needs. Preferred pharmacy is [facility] - Agreeable to bedside delivery.  Will have help from Father- Milan Salgado- [phone], Mother- Shaila Salgado- [phone]  and other immediate family upon discharge - Family to provide transportation home.  All questions addressed. Unit and SW direct numbers provided. Will continue to follow for course of hospitalization      Discharge Plan A and Plan B have been determined by review of patient's clinical status, future medical and therapeutic needs, and coverage/benefits for post-acute care in coordination with multidisciplinary team members.     1/27/2025  3:50 AM  Hx of prolonged Q-T interval on ECG [Z87.898]  Abdominal pain, unspecified abdominal location [R10.9]  History of creation of ostomy [Z93.9]  Leukocytosis, unspecified type [D72.829]  Vomiting, unspecified vomiting type, unspecified whether nausea present [R11.10]    PCP: Luis Madden, DO    PHARMACY:   [facility] [address] [address]  Phone: [phone] Fax: [phone]    [facility] [address] [address]  Phone: [phone] Fax: [phone]      Payor: BLUE CROSS [facility] EMPLOYEE BENEFIT / Plan: [employer] [place] / Product Type: Self Funded /       Gill Platt LMSW   - [facility]

## 2025-01-28 NOTE — SUBJECTIVE & OBJECTIVE
Subjective:     Interval History: had some abd pain during nite, for gastrograffin challenge this am, little ileostomy output    Post-Op Info:  * No surgery found *          Medications:  Continuous Infusions:   lactated ringers   Intravenous Continuous 75 mL/hr at 01/28/25 1042 New Bag at 01/28/25 1042     Scheduled Meds:   pyridoxine (vitamin B6) (B-6) 50 mg in 0.9% NaCl 50 mL IVPB  50 mg Intravenous Daily     PRN Meds:   pyridoxine (vitamin B6) (B-6) 50 mg in 0.9% NaCl 50 mL IVPB        Objective:     Vital Signs (Most Recent):  Temp: 97.8 °F (36.6 °C) (01/28/25 1143)  Pulse: 82 (01/28/25 1143)  Resp: 18 (01/28/25 1143)  BP: 102/62 (01/28/25 1143)  SpO2: 100 % (01/28/25 1143) Vital Signs (24h Range):  Temp:  [97.8 °F (36.6 °C)-98.9 °F (37.2 °C)] 97.8 °F (36.6 °C)  Pulse:  [64-86] 82  Resp:  [12-18] 18  SpO2:  [95 %-100 %] 100 %  BP: ()/(62-83) 102/62     Intake/Output - Last 3 Shifts         01/26 0700  01/27 0659 01/27 0700  01/28 0659 01/28 0700  01/29 0659    I.V. (mL/kg)  1009.6 (18.7)     IV Piggyback  44.5     Total Intake(mL/kg)  1054.2 (19.5)     Net  +1054.2                     Physical Exam  Vitals and nursing note reviewed.   Constitutional:       Appearance: She is well-developed.   HENT:      Head: Normocephalic.   Eyes:      Pupils: Pupils are equal, round, and reactive to light.   Cardiovascular:      Rate and Rhythm: Normal rate and regular rhythm.      Heart sounds: Normal heart sounds.   Pulmonary:      Effort: Pulmonary effort is normal. No respiratory distress.      Breath sounds: Normal breath sounds. No wheezing or rales.   Abdominal:      General: There is distension.      Palpations: Abdomen is soft. There is no mass.      Tenderness: There is no guarding or rebound.      Comments: Ileostomy little output   Skin:     General: Skin is warm and dry.   Neurological:      Mental Status: She is alert and oriented to person, place, and time.                Significant Labs:  BMP (Last 3  Results):   Recent Labs   Lab 01/27/25 0417 01/28/25  0853   * 82    141   K 3.5 3.8    106   CO2 23 26   BUN 16 9   CREATININE 0.8 0.7   CALCIUM 10.0 9.2   MG  --  1.8     CBC (Last 3 Results):   Recent Labs   Lab 01/27/25 0417 01/27/25  0436   WBC 15.59*  --    RBC 4.71  --    HGB 13.0  --    HCT 39.4 40     --    MCV 84  --    MCH 27.6  --    MCHC 33.0  --        Significant Diagnostics:  Gastrograffin challenge

## 2025-01-28 NOTE — PLAN OF CARE
Problem: Adult Inpatient Plan of Care  Goal: Plan of Care Review  Outcome: Progressing  Goal: Patient-Specific Goal (Individualized)  Outcome: Progressing  Goal: Absence of Hospital-Acquired Illness or Injury  Outcome: Progressing  Goal: Optimal Comfort and Wellbeing  Outcome: Progressing  Goal: Readiness for Transition of Care  Outcome: Progressing     Problem: Intestinal Obstruction  Goal: Optimal Bowel Function  Outcome: Progressing  Goal: Fluid and Electrolyte Balance  Outcome: Progressing  Goal: Absence of Infection Signs and Symptoms  Outcome: Progressing  Goal: Optimize Nutrition Status  Outcome: Progressing  Goal: Optimal Pain Control and Function  Outcome: Progressing     Problem: Nausea and Vomiting  Goal: Nausea and Vomiting Relief  Outcome: Progressing     Problem: Ileostomy  Goal: Optimal Coping  Outcome: Progressing  Goal: Absence of Bleeding  Outcome: Progressing  Goal: Effective Bowel Elimination  Outcome: Progressing  Goal: Fluid and Electrolyte Balance  Outcome: Progressing  Goal: Absence of Infection Signs and Symptoms  Outcome: Progressing  Goal: Anesthesia/Sedation Recovery  Outcome: Progressing  Goal: Optimal Pain Control and Function  Outcome: Progressing  Goal: Nausea and Vomiting Relief  Outcome: Progressing  Goal: Effective Urinary Elimination  Outcome: Progressing  Goal: Effective Oxygenation and Ventilation  Outcome: Progressing  Goal: Optimal Stoma Healing  Outcome: Progressing     Problem: Pain Acute  Goal: Optimal Pain Control and Function  Outcome: Progressing

## 2025-01-28 NOTE — ED NOTES
Bed: EDOU12  Expected date: 1/27/25  Expected time: 9:25 PM  Means of arrival:   Comments:  Flex 1

## 2025-01-29 ENCOUNTER — PATIENT MESSAGE (OUTPATIENT)
Dept: SURGERY | Facility: CLINIC | Age: 45
End: 2025-01-29
Payer: COMMERCIAL

## 2025-01-29 VITALS
WEIGHT: 119.06 LBS | DIASTOLIC BLOOD PRESSURE: 73 MMHG | HEART RATE: 67 BPM | BODY MASS INDEX: 22.48 KG/M2 | HEIGHT: 61 IN | TEMPERATURE: 98 F | RESPIRATION RATE: 18 BRPM | SYSTOLIC BLOOD PRESSURE: 137 MMHG | OXYGEN SATURATION: 100 %

## 2025-01-29 DIAGNOSIS — K50.012 CROHN'S DISEASE OF SMALL INTESTINE WITH INTESTINAL OBSTRUCTION: Primary | ICD-10-CM

## 2025-01-29 PROCEDURE — 63600175 PHARM REV CODE 636 W HCPCS: Performed by: STUDENT IN AN ORGANIZED HEALTH CARE EDUCATION/TRAINING PROGRAM

## 2025-01-29 PROCEDURE — 63600175 PHARM REV CODE 636 W HCPCS: Performed by: COLON & RECTAL SURGERY

## 2025-01-29 PROCEDURE — 25000003 PHARM REV CODE 250: Performed by: EMERGENCY MEDICINE

## 2025-01-29 PROCEDURE — 99238 HOSP IP/OBS DSCHRG MGMT 30/<: CPT | Mod: ,,, | Performed by: NURSE PRACTITIONER

## 2025-01-29 PROCEDURE — 99232 SBSQ HOSP IP/OBS MODERATE 35: CPT | Mod: ,,, | Performed by: INTERNAL MEDICINE

## 2025-01-29 PROCEDURE — 63600175 PHARM REV CODE 636 W HCPCS: Performed by: EMERGENCY MEDICINE

## 2025-01-29 RX ORDER — HEPARIN 100 UNIT/ML
5 SYRINGE INTRAVENOUS ONCE
Status: DISCONTINUED | OUTPATIENT
Start: 2025-01-29 | End: 2025-01-29

## 2025-01-29 RX ORDER — HEPARIN 100 UNIT/ML
5 SYRINGE INTRAVENOUS ONCE
Status: COMPLETED | OUTPATIENT
Start: 2025-01-29 | End: 2025-01-29

## 2025-01-29 RX ORDER — HEPARIN SODIUM,PORCINE 10 UNIT/ML
5 VIAL (ML) INTRAVENOUS ONCE
Status: DISCONTINUED | OUTPATIENT
Start: 2025-01-29 | End: 2025-01-29

## 2025-01-29 RX ADMIN — SODIUM CHLORIDE, POTASSIUM CHLORIDE, SODIUM LACTATE AND CALCIUM CHLORIDE: 600; 310; 30; 20 INJECTION, SOLUTION INTRAVENOUS at 03:01

## 2025-01-29 RX ADMIN — HYDROMORPHONE HYDROCHLORIDE 0.5 MG: 1 INJECTION, SOLUTION INTRAMUSCULAR; INTRAVENOUS; SUBCUTANEOUS at 03:01

## 2025-01-29 RX ADMIN — PYRIDOXINE HYDROCHLORIDE 50 MG: 100 INJECTION, SOLUTION INTRAMUSCULAR; INTRAVENOUS at 08:01

## 2025-01-29 RX ADMIN — HYDROMORPHONE HYDROCHLORIDE 0.5 MG: 1 INJECTION, SOLUTION INTRAMUSCULAR; INTRAVENOUS; SUBCUTANEOUS at 08:01

## 2025-01-29 RX ADMIN — ACETAMINOPHEN 1000 MG: 10 INJECTION, SOLUTION INTRAVENOUS at 06:01

## 2025-01-29 RX ADMIN — PROCHLORPERAZINE EDISYLATE 5 MG: 5 INJECTION INTRAMUSCULAR; INTRAVENOUS at 03:01

## 2025-01-29 RX ADMIN — HEPARIN 500 UNITS: 100 SYRINGE at 12:01

## 2025-01-29 NOTE — HOSPITAL COURSE
44-year-old woman with Crohn's disease status post total proctocolectomy with end ileostomy in 2012 by my partner.  She has recently had 3 small-bowel obstructions in the past year.  Recent MRE with active inflammation but ileoscopy did not show inflamation and therefore no medication changes were performed.  She presents with recurrent bowel obstruction that appears to be at her prior small bowel anastomosis.  Unclear what her small-bowel resection was for in the past.     Since she has had multiple recurrences, discussed conservative management at this point to see if she can get through followed by diagnostic laparoscopy with laparoscopic possible open lysis of adhesions and possible revision of her prior small bowel resection.  Admitted to crs.  GGC done 1/28 and showed contrast throut the colon. Able to tolerate a lfd.  On day of discharge pt is josefa regular diet, inc line healing well, positive for bm with flatus, ambulating in velasco without difficulty, adequate pain control with oral medication, VS stable and afebrile.     FU 2 weeks Dr. Campbell to discuss poss surgical intervention

## 2025-01-29 NOTE — NURSING
"[facility] Plan of Care Note    Dx:   Hx of prolonged Q-T interval on ECG [Z87.898]  Abdominal pain, unspecified abdominal location [R10.9]  History of creation of ostomy [Z93.9]  Leukocytosis, unspecified type [D72.829]  Vomiting, unspecified vomiting type, unspecified whether nausea present [R11.10]    Shift Events: ostomy has minimal output .    Goals of Care: return of bowel function    Neuro: aox4    Vital Signs: /88   Pulse 85   Temp 98 °F (36.7 °C) (Oral)   Resp 18   Ht 5' 1" (1.549 m)   Wt 54 kg (119 lb 0.8 oz)   LMP 03/30/2015   SpO2 97%   Breastfeeding No   BMI 22.49 kg/m²     Respiratory: RA    Diet: Diet Clear Liquid      Is patient tolerating current diet?  Fair    GTTS:  LR at 50ml/hr    Urine Output/Bowel Movement:   Last Bowel Movement: 01/28/25      Drains/Tubes/Tube Feeds (include total output/shift):  see  flowsheets    Lines: Port Cath Right Chest      Accuchecks: None    Skin:  Intact    Fall Risk Score:  5    Activity level? independent    Any scheduled procedures?  none    Any safety concerns? none    Other:   "

## 2025-01-29 NOTE — DISCHARGE SUMMARY
[facility]  Colorectal Surgery  Discharge Summary      Patient Name: Ivy Salgado  MRN: [id number]  Admission Date: 1/27/2025  Hospital Length of Stay: 2 days  Discharge Date and Time: 1/29/2025 12:41 PM  Attending Physician: No att. providers found   Discharging Provider: Vania Moeller NP  Primary Care Provider: Luis Madden DO     HPI:  No notes on file    * No surgery found *     Hospital Course:  44-year-old woman with Crohn's disease status post total proctocolectomy with end ileostomy in 2012 by my partner.  She has recently had 3 small-bowel obstructions in the past year.  Recent MRE with active inflammation but ileoscopy did not show inflamation and therefore no medication changes were performed.  She presents with recurrent bowel obstruction that appears to be at her prior small bowel anastomosis.  Unclear what her small-bowel resection was for in the past.     Since she has had multiple recurrences, discussed conservative management at this point to see if she can get through followed by diagnostic laparoscopy with laparoscopic possible open lysis of adhesions and possible revision of her prior small bowel resection.  Admitted to crs.  GGC done 1/28 and showed contrast throut the colon. Able to tolerate a lfd.  On day of discharge pt is josefa regular diet, inc line healing well, positive for bm with flatus, ambulating in velasco without difficulty, adequate pain control with oral medication, VS stable and afebrile.     FU 2 weeks Dr. Campbell to discuss poss surgical intervention     Goals of Care Treatment Preferences:  Code Status: Full Code    Living Will: Yes              Consults (From admission, onward)          Status Ordering Provider     Inpatient consult to Gastroenterology  Once        Provider:  (Not yet assigned)    Completed MARIA T LIM     Inpatient consult to Colorectal Surgery  Once        Provider:  (Not yet assigned)    Completed MARGARITO COULTER       "      Significant Diagnostic Studies: Labs: BMP:   Recent Labs   Lab 01/28/25  0853   GLU 82      K 3.8      CO2 26   BUN 9   CREATININE 0.7   CALCIUM 9.2   MG 1.8    and CBC No results for input(s): "WBC", "HGB", "HCT", "PLT" in the last 48 hours.    Pending Diagnostic Studies:       None          Final Active Diagnoses:    Diagnosis Date Noted POA    PRINCIPAL PROBLEM:  Crohn's disease of ileum with end ileostomy [K50.018] 09/18/2013 Yes    Small bowel obstruction [K56.609] 01/02/2016 Yes      Problems Resolved During this Admission:      Discharged Condition: good    Disposition: Home or Self Care    Follow Up:   Follow-up Information       DUNCAN Campbell MD Follow up in 2 week(s).    Specialty: Colon and Rectal Surgery  Contact information:  [address] [place] [id number]  [phone]                           Patient Instructions:      Diet Adult Regular   Order Comments: Low fiber, no fresh fruit or fresh vegetables, no nuts, grapes, popcorn or raisins     Lifting restrictions   Order Comments: No lifting anything greater than 5 pounds     No dressing needed   Order Comments: May shower, no tub bath     Notify your health care provider if you experience any of the following:  temperature >100.4     Notify your health care provider if you experience any of the following:  persistent nausea and vomiting or diarrhea     Notify your health care provider if you experience any of the following:  severe uncontrolled pain     Notify your health care provider if you experience any of the following:  redness, tenderness, or signs of infection (pain, swelling, redness, odor or green/yellow discharge around incision site)     Notify your health care provider if you experience any of the following:  difficulty breathing or increased cough     Notify your health care provider if you experience any of the following:  severe persistent headache     Notify your health care provider if you " experience any of the following:  worsening rash     Notify your health care provider if you experience any of the following:  persistent dizziness, light-headedness, or visual disturbances     Notify your health care provider if you experience any of the following:  increased confusion or weakness     Medications:  Reconciled Home Medications:      Medication List        CONTINUE taking these medications      clonazePAM 1 MG tablet  Commonly known as: KlonoPIN  Take 1 tablet (1 mg total) by mouth 2 (two) times daily.     cyclobenzaprine 10 MG tablet  Commonly known as: FLEXERIL  Take 1 tablet (10 mg total) by mouth every evening as needed for muscle pain     droNABinol 10 MG capsule  Commonly known as: MARINOL  Take 1 capsule (10 mg total) by mouth once daily.     estradiol 0.025 mg/24 hr 0.025 mg/24 hr  Place 1 patch onto the skin once a week. CHANGES ON MONDAYS     gabapentin 300 MG capsule  Commonly known as: NEURONTIN  Take 1 capsule (300 mg total) by mouth 2 (two) times daily.     loperamide 1 mg/7.5 mL solution  Commonly known as: IMODIUM  Take 4 mg by mouth 3 (three) times daily as needed for Diarrhea.     mirtazapine 30 MG disintegrating tablet  Commonly known as: REMERON SOL-TAB  Take 1 tablet (30 mg total) by mouth nightly.     multivitamin per tablet  Commonly known as: THERAGRAN  Take 1 tablet by mouth once daily.     nadoloL 40 MG tablet  Commonly known as: CORGARD  Take 1 tablet (40 mg total) by mouth once daily. Patient needs appointment before next refill.     pantoprazole 40 MG tablet  Commonly known as: PROTONIX  Take 1 tablet (40 mg total) by mouth 2 (two) times daily.     promethazine 25 MG tablet  Commonly known as: PHENERGAN  Take 1 tablet (25 mg total) by mouth every 6 (six) hours as needed for Nausea.     tamsulosin 0.4 mg Cap  Commonly known as: FLOMAX  Take 1 capsule (0.4 mg total) by mouth once daily.     valACYclovir 500 MG tablet  Commonly known as: VALTREX  Take 1 tablet (500 mg total)  by mouth once daily.     vedolizumab 300 mg Solr injection  Commonly known as: ENTYVIO  Inject 300 mg into the vein every 8 weeks.     zolpidem 10 mg Tab  Commonly known as: AMBIEN  Take 1 tablet (10 mg total) by mouth every evening.              Vania Moeller NP  Colorectal Surgery  [facility]

## 2025-01-29 NOTE — NURSING
Discharge instructions and medications reviewed with patient at the bedside; verbalized understanding. Patient encouraged to attend all follow up appointments; pt agreed. No complaints of pain or discomfort at this time. Port heplocked and deaccessed; needle intact. Patient discharged off unit via walkout method with dad.

## 2025-01-29 NOTE — PROGRESS NOTES
"[facility]  Gastroenterology Inflammatory Bowel Disease Inpatient Service   Progress Note    Patient Name: Ivy Salgado  MRN: [id number]  Admission Date: 1/27/2025  Hospital Length of Stay: 2 days  Code Status: Full Code   Attending Provider: DUNCAN Campbell MD   Consulting Provider: Shelby Post MD  Primary Care Physician: Luis Madden DO  Principal Problem:Crohn's disease of small intestine with other complication  Consults  Subjective:   Interval History:   S/p gastrograffin challenge yesterday. Contrast passed on second xray. Trailing food today to see how she tolerates. Has not needed NGT, mild nausea, pain better with IV tylenol.     Current IBD meds:  Entyvio LD 12/18 ND2/13.     ER/Hospital Course:  Ivy Salgado is a 44 y.o. female with Crohn's disease with end ileostomy and recurrent ileitis and 3 SBOs in the past year, now with 1 day of progressive abdominal pain, nausea, vomiting, and decreased stoma output. She states it feels like previous SBOs. In the ED, workup revealed an SBO without discrete transition point but dilated proximal and decompressed distal small bowel. CT appears similar to her previous CT at the time of her last SBO in 10/2024. For this, CRS was called for further management. Leukocytosis, CRP pending. Her last clinic visit with Dr. 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 I am concerned that this will likely need surgical intervention." CRS admitting patient for recurrent SBO, GI IBD following for continuity of care.      Her last dose of Entyvio was 12/18 and her next dose is scheduled for 2/13.     IBD Previous History:   Ivy Salgado is a 44 y.o. female with Crohn's disease with end ileostomy and recurrent ileitis, recurrent C diff (2014 x 2), ARPITA/depression, " arthritis, h/o abnormal pap smear- LYLA I, nephrolithiasis-nonobstructive, GERD, long QTc syndrome (Type III, on nadolol), s/p SHELLIE (2015) for recurrent ovarian cysts and endometriosis, early evolving melanoma in situ (buttocks and para-stomal site, excised in 2018 and 2019 respectively), history of genital HSV, imuran induced pancreatitis, pancreatic tail cyst, recurrent UTI (h/o ESBL 2018), multiple thyroid nodules, osteopenia, left femoral head chronic avascular necrosis, history bacterial vaginosis, h/o abnormal LFTs (elevated ALP since 2016), family history of colon cancer.  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  She tried multiple meds including imuran (pancreatitis, remicade (secondary nonresponder), humira (DIL), MTX (leukopenia).  She met Dr. Cameron initially in GI clinic at [facility] 5/2009 at which time she had some diarrhea and on pred 30 mg/d. Colonoscopy 6/2009 significant for diffuse proctosigmoiditis with remainder of colon normal and end to end ileocolonic anastomsis with inflammation. Placed on rowasa enemas but too expensive so switched to steroid enemas.  Flex sig 10/2009 ongoing proctosigmoiditis.  In 2010 she had rectal bleeding that improved with proctofoam and started cimzia with improvement of symptoms.  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  Colonoscopy c/w moderate proctosigmoiditis and in 5/2010 she continued proctofoam BID and took extra dose of cimzia to induce remission.  In 7/2010 flex sig confirmed active left sided colitis and she started prednisone in 3/2011 with repeat colonoscopy 8/2011 c/w ongoing rectosigmoid inflammation with mild mucosal ulcer the transverse colon with  ileocolonic anastomotic stricture and normal TI. In 9/2011 pt was on canasa, cimzia and prednisone taper.  In April/May 2012 pt hospitalized and CT c/w sigmoid wall thickening and colonoscopy confirmed distal 30 cm with moderate inflammation with ileocolonic anastomotic ulcers and normal TI.  In 6/2012 Dr Parsons proceeded with completion proctocolectomy with end ileostomy with pathology confirming transmural active inflammation with abscess c/w Crohn's disease.  Post-operatively she had peristomal ulcer and perineal wounds. In 1/2013 patient had non-healing perineal wound S/P debridement  with steroids and treated with cipro. In 8/2013 she had EUA with debridement and fulguration of non-healing perineal wound.  In 8/2013 pt had epigastric abd pain and EGD c/w benign gastric polyp, labs normal, CT c/w short segment WB thickening involving ileum proximal to the stoma and resolution of right adnexal mass.  In 9/2013 ileoscopy through stoma significant for segment mucosa at 5 cm proximal to stoma mild congested, eroded, ulcerated area of 6-30 cm and mucosal distal is normal. CT A/p 1/2024 c/w 2 separate segments of SB proximal to the ostomy and near staple row in RLQ with mild enhancement and wall thickening c/w mild inflammatory changes and segment of bowel forming ostomy.  SB enteroscopy 2/2014 significant for single 2 mm ulcer in the proximal ileum.  In 2014 she had 2 hospitalizations for high ileostomy output with was treated with prednisone with taper.  In 10/2014 CT showed few mild dilated loops SB in anterior right pelvis and loop with surgical suture line abuts anterior pelvic wall and possible adhesion. In 10/2014 ileoscopy through stomal normal.  In 1/2015 pt had focal segment of mild distal SB dilation and C diff positive and pt treated antibiotics followed by probiotics and resumed cimzia. Dr. Cameron then referred patient to Dr. Parish Ross in 2015 and he mentions recurrent C diff, recurrent SBOs likely  related to adhesions. In 2015 she had SHELLIE/BSO with bladder repair and due to this missed one dose of cimzia and then resumed 5/2015. In 1/2016 she had partial SBO due to adhesions and UGI/SBFT and CT did not show fixed obstruction. In 10/2016 she reported to Dr. Ross postprandial nausea and epigastric pain, weight loss, fatigue, low grade fever and watery stool output and prednisone helped symptoms but not as good response as past. She was off of cimzia 8-9/2016 though sx occurred prior to holding cimzia. Ileoscopy through stoma 10/2016 significant for normal 15 cm of ileum examined. Overall Dr. Ross felt that her symptoms responded well to prednisone and restarting cimzia. She presented to her OV 1/2017 with increased ileostomy output with C diff negative with symptoms ongoing and MRE 5/2017 c/w long segment of diffuse wall thickening and mucosal enhancement involving the distal ileum. CT A/P 6/2017 significant for left ovarian cyst, mild biliary dilation stable, hypodensities and punctate bilateral renal calcifications.  Ileoscopy through stoma 8/2017 c/w normal ileum from stoma to 15-20 cm. CT A/P 11/2017 significant for righ adnexal mass abutting theright external iliac vein and pelvic US recommended, bilateral renal hypodensities and calcifications. MRE 11/2017 c/w mild questionable ileal inflammation and luminal narrowing involving short segment of SB within the right hemipelvis with mild dilation and upstream bowel 2.5 cm with findings concerning for developing stricture. She stopped cimzia 6/2017 and started stelara 7/11/2017 though discontinued in 1/2018 due to rash.  Due to symptoms pt was started in 1/2018 on prednisone 10 mg/d, bentyl. In 2/2018 CT A/P c/w bilateral nonobstructing renal calculi, mild bilateral cortical scarring of lower renal poles. MRE 9/2018 significant for short segment of distal ileum with scattered regions of non uniform wall thickening and possible additional short segment of  proximal jejunum with mild wall thickening. In fall 2018 she was placed on entocort though due to fast transit was opening capsule and taking this. CT A/P 12/2018 c/w several bilateral renal hypodensities c/w cysts, nonobstructive bilateral nephrolithiasis, right adnexal complex cyst. She started entyvio 11/20/18 and due to recurrent UTIs and palpitations (dx QT prolongation and started on beta blocker) so discontinued in 10/27/20. MRE 5/15/19 significant for right adnexal cystic lesion, large left adnexal cystic lesion.  On 5/30/19 patient had exploratory laparotomy with lysis of adhesions, BSO/mass resection. CT A/P 6/2019 significant for nonspecific rim enhancing fluid collection, mild left hydroureteronephrosis. In 2019 there were several mos she held entyvio due to gyn and melanoma surgery. MRE 5/2020 significant for small non enhancing fluid collection within presacral region superior to the vaginal cuff, right sided pelvocaliectasis, bilateral cortical renal cysts, left femoral head chronic avascular necrosis. CT A/P 9/2020 c/w mild left hydrouriteronephrosis due to distal left ureter stone with urothelial thickening and enhancement.  MRE 4/2021 right ovarian cyst likely hemorrhagic cyst.  In 1/2022 patient was placed by Dr. Ross on pantoprazole 40 mg BID. For joint pains and chronic abdominal pain Dr. Ross treated her with gabapentin for several years. In 1/2023 she had multiple intrarenal stones.  Repeat CT 10/2023 small non-obstructing renal stones. She was hospitalized 12/31/23-1/7/24 for high ileostomy output with stool studies neg for infection.  Elevated inflammatory markers (ESR 94, CRP 22.8), stool caprotectin 281. CT A/P showed slow flow through few mid to distal SB loops noting venous vascular engorgement about these loops and wall hyperemia. On 1/3/24 she had EGD c/w mild HP neg gastritis and ileoscopy through stoma with about 6 apthous ulcers in the distal 20 cm of the ileum and biopsies c/w  active inflammation.  Pt had elevated LFTs (peaked 1/4/24 ///TB normal).  Hepatology consulted and serological workup negative and subsequent LFTs normalized with no intervention. Abdominal US revealed mildly dilated bile ducts in the central right hepatic lobe and MRCP c/w no evidence of biliary obstruction, punctate cystic focus in the pancreas tail likely side IPMN and f/u in 1 year recommended.  She was treated with antidiarrheals (imodium helped but lomotil caused palpitations) and IVFs. Lotronex was being considered but due to prolonged QT unable to proceed with this. Due to mild ileitis plan for outpatient entyvio.  Since her discharge from hospital she had continued high ileostomy output and dehydration and we recommended that she return for hospitalization but pt did not wish to go to ER.  She continued with high ileostomy output up to 8 liters/day but if fasting down to 4 liters/day despiate taking imodium 1 tab QID.  She is not taking lomotil due to palpitations.  At her OV 1/16/24 due to high ileostomy output and dehydration I proceeded with hospital admission 1/16/24-1/27/24 at which time she continued on IVFs, antidiarrheals, pain meds and started on IV solumedrol with repeat stool calprotectin done on 1/16/24 93.9 though unclear accuracy given some granulation tissue on stoma could influence this test. On 1/24/24 stool calprotectin 117.6, ileoscopy through stoma with one small apthous ulcer 19 cm proximal to stoma. She had stool studies neg for infection and then discharged home on liquid imodium, pred 40 mg with taper and plan to start entyvio. She restarted entyvio with the re induction doses on 1/30/2024 then resumed every 8 weeks infusions and by her office visit in 3/2024 patient no longer needed IVFs and was recovering well from the hospital stay. She reported recurrent UTIs with entyvio in the past and was advised to closely monitor her symptoms and since then has had  "recurrent UTI but overall I fel this was highly unusual given MOA of entyvio.  In 4/2024 pt hospitalized for abd pain and decreased ileostomy output and CT A/P significant for SBO with transition point seen in the midline upper pelvis. The small bowel anastomosis and also ileostomy site are patent and do not appear to be source for suspected obstruction. This was managed conservatively and followed by MRE 5/2024 significant for short segmented stricture with mild active inflammation adjacent to ehr RLQ surgical anastomosis with tethering and distortion. Ileoscopy through stoma 6/4/24 significant of neoterminal ileum immediately proximal to the stoma normal.     Scheduled Meds:   acetaminophen  1,000 mg Oral Q8H    enoxparin  40 mg Subcutaneous Q24H (prophylaxis, 1700)    pyridoxine (vitamin B6) (B-6) 50 mg in 0.9% NaCl 50 mL IVPB  50 mg Intravenous Daily     Continuous Infusions:  PRN Meds:.  Current Facility-Administered Medications:     HYDROmorphone, 0.5 mg, Intravenous, Q4H PRN    prochlorperazine, 5 mg, Intravenous, Q6H PRN    sodium chloride 0.9%, 10 mL, Intravenous, PRN    ROS - negative except for otherwise stated in HPI      Objective:   /85   Pulse 76   Temp 97.8 °F (36.6 °C)   Resp 14   Ht 5' 1" (1.549 m)   Wt 54 kg (119 lb 0.8 oz)   LMP 03/30/2015   SpO2 98%   Breastfeeding No   BMI 22.49 kg/m²     Physical Exam:  Constitutional:  not in acute distress and well developed  HENT: Head: Normal, normocephalic.  Eyes: conjunctiva clear and sclera nonicteric  GI: soft, distended, ostomy bag with minimal output.   Skin: normal color on visualized skin  Neurological: alert, oriented x3  Psychiatric: mood and affect are within normal limits, pt is a good historian; no memory problems were noted    Assessment/Plan:   Ivy Salgado is a 44 y.o. female with Crohn's disease with end ileostomy and recurrent ileitis and 3 SBOs in the past year, now with 1 day of progressive abdominal pain, nausea, " vomiting, and decreased stoma output. CT appears similar to her previous CT at the time of her last SBO in 10/2024. CRS admitted patient for management of SBO.      Her last dose of Entyvio was 12/18 and her next dose is scheduled for 2/13.      Impression:  CRP here 1.3. Has had mild ileitis that was accessible by scope through stoma that has resolved on entyvio in the past, appears to have separate area from prior surgeries that may be culprit of recurrent SBO. GGC done 1/28 and showed contrast throughout the colon. Able to tolerate a full diet today, planned for DC home with outpatient CRS follow up to discuss possible surgical resection.       Problem List:  Crohn's disease with end ileostomy, recurrent ileitis and recurrent SBO     Recommendations:  - Appreciate CRS management of SBO, resolved with conservative management   - Continue with Entyvio infusion on 2/13 for now   - Avoid NSAIDs given risk of IBD exacerbation  - DVT prophylaxis- IBD pts at 3-4 fold higher likelihood of DVT, DVT prophylaxis- lovenox ordered, MAR reviewed   - Narcotics increase risk of infection along with morbidity/mortality in IBD patients, tylenol preferred and if pain not controlled with tylenol then short-acting morphine preferred    Thank you for involving us in the care of Ivy Salgado. Please call with any additional questions, concerns or changes in the patient's clinical status.     Shelby Post  Gastroenterology Fellow PGY   [facility]

## 2025-01-29 NOTE — PLAN OF CARE
Problem: Adult Inpatient Plan of Care  Goal: Plan of Care Review  Outcome: Progressing  Goal: Optimal Comfort and Wellbeing  Outcome: Progressing     Problem: Intestinal Obstruction  Goal: Optimal Bowel Function  Outcome: Progressing  Goal: Optimize Nutrition Status  Outcome: Progressing  Goal: Optimal Pain Control and Function  Outcome: Progressing     Problem: Nausea and Vomiting  Goal: Nausea and Vomiting Relief  Outcome: Progressing     Problem: Pain Acute  Goal: Optimal Pain Control and Function  Outcome: Progressing

## 2025-01-30 ENCOUNTER — PATIENT OUTREACH (OUTPATIENT)
Dept: ADMINISTRATIVE | Facility: CLINIC | Age: 45
End: 2025-01-30
Payer: COMMERCIAL

## 2025-01-30 ENCOUNTER — PATIENT MESSAGE (OUTPATIENT)
Dept: ADMINISTRATIVE | Facility: CLINIC | Age: 45
End: 2025-01-30
Payer: COMMERCIAL

## 2025-01-30 NOTE — TELEPHONE ENCOUNTER
Called patient via telephone but there was no answer. LVM for patient informing I was calling to assist with scheduling an Hosp. F/u appt. Instructed to return the call at [phone] for more assistance.

## 2025-01-31 ENCOUNTER — TELEPHONE (OUTPATIENT)
Dept: GASTROENTEROLOGY | Facility: CLINIC | Age: 45
End: 2025-01-31
Payer: COMMERCIAL

## 2025-01-31 RX ORDER — DIPHENHYDRAMINE HYDROCHLORIDE 50 MG/ML
25 INJECTION INTRAMUSCULAR; INTRAVENOUS
OUTPATIENT
Start: 2025-02-09

## 2025-01-31 RX ORDER — IPRATROPIUM BROMIDE AND ALBUTEROL SULFATE 2.5; .5 MG/3ML; MG/3ML
3 SOLUTION RESPIRATORY (INHALATION)
OUTPATIENT
Start: 2025-02-09

## 2025-01-31 RX ORDER — HEPARIN 100 UNIT/ML
500 SYRINGE INTRAVENOUS
OUTPATIENT
Start: 2025-02-09

## 2025-01-31 RX ORDER — METHYLPREDNISOLONE SOD SUCC 125 MG
40 VIAL (EA) INJECTION
OUTPATIENT
Start: 2025-02-09

## 2025-01-31 RX ORDER — SODIUM CHLORIDE 0.9 % (FLUSH) 0.9 %
10 SYRINGE (ML) INJECTION
OUTPATIENT
Start: 2025-02-09

## 2025-01-31 RX ORDER — EPINEPHRINE 1 MG/ML
0.3 INJECTION, SOLUTION, CONCENTRATE INTRAVENOUS
OUTPATIENT
Start: 2025-02-09

## 2025-01-31 RX ORDER — ACETAMINOPHEN 325 MG/1
650 TABLET ORAL
OUTPATIENT
Start: 2025-02-09

## 2025-01-31 NOTE — TELEPHONE ENCOUNTER
----- Message from Peace Garcia MD sent at 1/31/2025 10:57 AM CST -----  Yes, it should be fine  Thanks  SS  ----- Message -----  From: Sia Valdez RN  Sent: 1/31/2025  10:39 AM CST  To: Peace Garcia MD    Scheduled for 4/8 at 9am as overbook. 4/7 is pretty busy. That ok?  ----- Message -----  From: Peace Garcia MD  Sent: 1/31/2025  10:32 AM CST  To: [facility] Staff    Based on below  Please let pt know to proceed with entyvio as scheduled 2/13, her infusion after this will be 4/10 but she will need clearance after surgery which we will provide at her f/u visit prior to the infusion  IBD team will see her during her surgery 3/19   I would like to change her f/u with me to 47 or 4/8 instead of 3/28  If needed you can take pt from 4/8 and put on 3/28     Thanks  SS  ----- Message -----  From: Phu Kevin MD  Sent: 1/30/2025   7:18 AM CST  To: Peace Garcia MD; [facility] Staff    She got discharged yesterday afternoon.  Has a follow up appointment with Dr. Campbell on February 28.  Has her next Entyvio scheduled on February 13.  She actually has his surgery date scheduled for March 19.  Her follow up with you is on March 28.  Wanted to give you an update in case you wanted to move her follow-up out a little bit farther since she will be having surgery just a little over a week before your follow-up appointment.

## 2025-01-31 NOTE — PROGRESS NOTES
- Current therapy:  Entyvio 300 mg IV q 8 weeks (11/20/18- 10/27/20- stopped due to UTIs and palpitations, reinduced 1/30/24, LD 12/18, ND 2/13)   - Last office visit:  12/20/24  - Labs: CBC/CMP 1/2025, repeat 7/2025, TB 1/2025, repeat 1/2026, Hep B 12/2024, repeat 12/2025  - Next office visit:  4/8/25  - Allergies reviewed.   - Therapy plan updated.

## 2025-01-31 NOTE — PLAN OF CARE
[facility] - [facility]  Discharge Final Note    Primary Care Provider: Luis Madden DO  Expected Discharge Date: 1/29/2025    Patient medically ready for discharge to home.     Transportation by family.     Is family/patient aware of discharge: yes     Hospital follow up scheduled: yes       Final Discharge Note (most recent)       Final Note - 01/31/25 0924          Final Note    Assessment Type Final Discharge Note     Anticipated Discharge Disposition Home or Self Care     Hospital Resources/Appts/Education Provided Provided patient/caregiver with written discharge plan information                     Important Message from Medicare         Referral Info (most recent)       Referral Info    No documentation.                 Contact Info       DUNCAN Campbell MD   Specialty: Colon and Rectal Surgery    [facility] [facility] [address]   Phone: [phone]       Next Steps: Follow up in 2 week(s)          Future Appointments   Date Time Provider Department Center   2/13/2025 10:30 AM CHAIR 01, [facility] [place]   2/13/2025  1:00 PM Iris Simon MD [facility] DERM [facility]   2/17/2025  4:00 PM Mono Giles III, PA-C [facility] SP[facility]TS [place]   2/20/2025 11:00 AM Natali Soto MD [facility] HEPAT [facility]   2/27/2025 10:30 AM Tawanda Mahajan MD [facility]   2/28/2025  3:00 PM DUNCAN Campbell MD [facility] COLON [facility]   3/17/2025  2:00 PM Luis Madden DO [facility]   3/17/2025  4:00 PM Sharon Babb MD [facility] [place]   3/28/2025  8:30 AM Peace Garcia MD [facility] G[facility]     Ant Davis RN  Case Management  Ext: [phone]  01/31/2025

## 2025-02-13 ENCOUNTER — OFFICE VISIT (OUTPATIENT)
Dept: DERMATOLOGY | Facility: CLINIC | Age: 45
End: 2025-02-13
Payer: COMMERCIAL

## 2025-02-13 DIAGNOSIS — D22.30 MELANOCYTIC NEVI OF FACE: Primary | ICD-10-CM

## 2025-02-13 DIAGNOSIS — D22.60 MULTIPLE BENIGN MELANOCYTIC NEVI OF UPPER EXTREMITY, LOWER EXTREMITY, AND TRUNK: ICD-10-CM

## 2025-02-13 DIAGNOSIS — Z86.018 HISTORY OF DYSPLASTIC NEVUS: ICD-10-CM

## 2025-02-13 DIAGNOSIS — D22.5 MULTIPLE BENIGN MELANOCYTIC NEVI OF UPPER EXTREMITY, LOWER EXTREMITY, AND TRUNK: ICD-10-CM

## 2025-02-13 DIAGNOSIS — D22.70 MULTIPLE BENIGN MELANOCYTIC NEVI OF UPPER EXTREMITY, LOWER EXTREMITY, AND TRUNK: ICD-10-CM

## 2025-02-13 DIAGNOSIS — D22.4 MELANOCYTIC NEVUS OF SCALP: ICD-10-CM

## 2025-02-13 DIAGNOSIS — L81.4 LENTIGINES: ICD-10-CM

## 2025-02-13 DIAGNOSIS — Z12.83 SCREENING EXAM FOR SKIN CANCER: ICD-10-CM

## 2025-02-13 PROCEDURE — G2211 COMPLEX E/M VISIT ADD ON: HCPCS | Mod: S$GLB,,, | Performed by: DERMATOLOGY

## 2025-02-13 PROCEDURE — 99213 OFFICE O/P EST LOW 20 MIN: CPT | Mod: S$GLB,,, | Performed by: DERMATOLOGY

## 2025-02-13 PROCEDURE — 1159F MED LIST DOCD IN RCRD: CPT | Mod: CPTII,S$GLB,, | Performed by: DERMATOLOGY

## 2025-02-13 PROCEDURE — 1160F RVW MEDS BY RX/DR IN RCRD: CPT | Mod: CPTII,S$GLB,, | Performed by: DERMATOLOGY

## 2025-02-13 PROCEDURE — 1111F DSCHRG MED/CURRENT MED MERGE: CPT | Mod: CPTII,S$GLB,, | Performed by: DERMATOLOGY

## 2025-02-13 NOTE — PROGRESS NOTES
"  Patient Information  Name: Ivy Salgado  : 1980  MRN: [id number]     Referring Physician:  No ref. provider found   Primary Care Physician:  Luis Madden DO   Date of Visit: 25      Subjective:     History of Present lllness:    Ivy Salgado is a 44 y.o. female who presents with a chief complaint of moles.  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  Patient is here today for a "mole" check.   Today, patient has no additional complaints. Denies any new, changing, or symptomatic lesions on the skin.    Patient was last seen: 2023.  Prior notes by myself reviewed.   Clinical documentation obtained by nursing staff reviewed.    Review of Systems    Objective:   Physical Exam   Constitutional: She appears well-developed and well-nourished. No distress.   HENT:   Mouth/Throat: Lips normal.    Eyes: Lids are normal.  No conjunctival no injection.   Musculoskeletal: Lymphadenopathy:      Cervical: No cervical adenopathy.      Upper Body:   No axillary adenopathy present.No supraclavicular adenopathy is present.      Lower Body:   No inguinal adenopathy.    Lymphadenopathy: No supraclavicular adenopathy is present.     She has no cervical adenopathy.     She has no axillary adenopathy.     She has no inguinal adenopathy.   Neurological: She is alert and oriented to person, place, and time. She is not disoriented.   Psychiatric: She has a normal mood and affect.   Skin:   Areas Examined (abnormalities noted in diagram):   Scalp / Hair Palpated and Inspected  Head / Face Inspection Performed  Neck Inspection Performed  Chest / Axilla Inspection Performed  Abdomen Inspection Performed  Genitals / Buttocks / Groin Inspection Performed  Back Inspection Performed  RUE Inspected  LUE Inspection Performed  RLE Inspected  LLE Inspection " Performed  Nails and Digits Inspection Performed  Gland Inspection Performed                     Diagram Legend     Erythematous scaling macule/papule c/w actinic keratosis       Vascular papule c/w angioma      Pigmented verrucoid papule/plaque c/w seborrheic keratosis      Yellow umbilicated papule c/w sebaceous hyperplasia      Irregularly shaped tan macule c/w lentigo     1-2 mm smooth white papules consistent with Milia      Movable subcutaneous cyst with punctum c/w epidermal inclusion cyst      Subcutaneous movable cyst c/w pilar cyst      Firm pink to brown papule c/w dermatofibroma      Pedunculated fleshy papule(s) c/w skin tag(s)      Evenly pigmented macule c/w junctional nevus     Mildly variegated pigmented, slightly irregular-bordered macule c/w mildly atypical nevus      Flesh colored to evenly pigmented papule c/w intradermal nevus       Pink pearly papule/plaque c/w basal cell carcinoma      Erythematous hyperkeratotic cursted plaque c/w SCC      Surgical scar with no sign of skin cancer recurrence      Open and closed comedones      Inflammatory papules and pustules      Verrucoid papule consistent consistent with wart     Erythematous eczematous patches and plaques     Dystrophic onycholytic nail with subungual debris c/w onychomycosis     Umbilicated papule    Erythematous-base heme-crusted tan verrucoid plaque consistent with inflamed seborrheic keratosis     Erythematous Silvery Scaling Plaque c/w Psoriasis     See annotation    No images are attached to the encounter or orders placed in the encounter.      [] Data reviewed  [] Prior external notes reviewed  [] Independent review of test  [] Management discussed with another provider  [] Independent historian    Assessment / Plan:        Melanocytic nevi of face  Melanocytic nevus of scalp  Multiple benign melanocytic nevi of upper extremity, lower extremity, and trunk  Multiple benign-appearing nevi present on exam today. Reassurance  provided. Counseled patient to periodically examine moles and return to clinic if any changes in size, shape, or color are noted or if it becomes symptomatic (bleeding, itching, pain, etc).  Recommend using a broad-spectrum, water-resistant sunscreen with SPF of 30 or higher--reapply every 2 hours. Seek shade, wear sun-protective clothing, and perform regular skin self-exams.    Lentigines  These are benign sun spots which should be monitored for changes. Daily sun protection will reduce the number of new lesions.   Recommend using a broad-spectrum, water-resistant sunscreen with SPF of 30 or higher--reapply every 2 hours. Seek shade, wear sun-protective clothing, and perform regular skin self-exams.    History of dysplastic nevus, severe atypia (early evolving melanoma in situ)   - stable and chronic  Area(s) of previous dysplastic nevus evaluated with no evidence of recurrence. Reassurance provided.    Screening exam for skin cancer  Total body skin examination performed today as noted in physical exam. No lesions suspicious for malignancy were seen.  Recommend using a broad-spectrum, water-resistant sunscreen with SPF of 30 or higher--reapply every 2 hours. Seek shade, wear sun-protective clothing, and perform regular skin self-exams.         Follow up in about 1 year (around 2/13/2026) for TBSE, or sooner if any new problems or changing lesions.      Iris Simon MD, FAAD  [facility]

## 2025-02-14 ENCOUNTER — PATIENT MESSAGE (OUTPATIENT)
Dept: HEPATOLOGY | Facility: CLINIC | Age: 45
End: 2025-02-14
Payer: COMMERCIAL

## 2025-02-14 DIAGNOSIS — R79.89 ELEVATED LIVER FUNCTION TESTS: Primary | ICD-10-CM

## 2025-02-14 DIAGNOSIS — K50.018 CROHN'S DISEASE OF SMALL INTESTINE WITH OTHER COMPLICATION: ICD-10-CM

## 2025-02-16 DIAGNOSIS — K50.018 CROHN'S DISEASE OF SMALL INTESTINE WITH OTHER COMPLICATION: ICD-10-CM

## 2025-02-17 RX ORDER — PROMETHAZINE HYDROCHLORIDE 25 MG/1
25 TABLET ORAL EVERY 6 HOURS PRN
Qty: 30 TABLET | Refills: 0 | Status: SHIPPED | OUTPATIENT
Start: 2025-02-17

## 2025-02-17 NOTE — TELEPHONE ENCOUNTER
"Primary provider: Dr. Peace Garcia    IBD medications:  Entyvio 300 mg IV q 8 weeks      Refill request for:      Pended Medication(s)   Requested Prescriptions     Pending Prescriptions Disp Refills    promethazine (PHENERGAN) 25 MG tablet 30 tablet 0     Sig: Take 1 tablet (25 mg total) by mouth every 6 (six) hours as needed for Nausea.           Allergies reviewed: Yes    Drug Monitoring labs/frequency for all IBD meds:    CBC and CMP every 6 months  TB and Hep B every 12 months    Lab Results   Component Value Date    HEPBSAG Non-reactive 12/20/2024    HEPBCAB Non-reactive 12/20/2024     Lab Results   Component Value Date    TBGOLDPLUS Negative 01/09/2025     No results found for: "QUANTNILVALU", "QUANTIFERON", "QUANTTBGDPL"   No results found for: "TSPOTSCREN"  Lab Results   Component Value Date    QLZWWAVS22DZ 41 12/20/2024    NZTDVOJD89 267 12/20/2024     Lab Results   Component Value Date    WBC 15.59 (H) 01/27/2025    HGB 13.0 01/27/2025    HCT 40 01/27/2025    MCV 84 01/27/2025     01/27/2025     Lab Results   Component Value Date    CREATININE 0.7 01/28/2025    ALBUMIN 4.0 01/27/2025    BILITOT 0.6 01/27/2025    ALKPHOS 155 (H) 01/27/2025    AST 28 01/27/2025    ALT 23 01/27/2025    GGT 65 (H) 12/20/2024       Lab due date (mo/yr):  7/2025    Labs scheduled: Yes     Next appt: 4/8/2025    RX refill sent to provider for amount until next labs: Yes       "

## 2025-02-18 ENCOUNTER — LAB VISIT (OUTPATIENT)
Dept: LAB | Facility: HOSPITAL | Age: 45
End: 2025-02-18
Attending: INTERNAL MEDICINE
Payer: COMMERCIAL

## 2025-02-18 DIAGNOSIS — K50.018 CROHN'S DISEASE OF SMALL INTESTINE WITH OTHER COMPLICATION: ICD-10-CM

## 2025-02-18 DIAGNOSIS — R79.89 ELEVATED LIVER FUNCTION TESTS: ICD-10-CM

## 2025-02-18 LAB
ALBUMIN SERPL BCP-MCNC: 4 G/DL (ref 3.5–5.2)
ALP SERPL-CCNC: 143 U/L (ref 40–150)
ALT SERPL W/O P-5'-P-CCNC: 35 U/L (ref 10–44)
AST SERPL-CCNC: 44 U/L (ref 10–40)
BILIRUB DIRECT SERPL-MCNC: 0.1 MG/DL (ref 0.1–0.3)
BILIRUB SERPL-MCNC: 0.4 MG/DL (ref 0.1–1)
GGT SERPL-CCNC: 62 U/L (ref 8–55)
INR PPP: 1 (ref 0.8–1.2)
PROT SERPL-MCNC: 8.2 G/DL (ref 6–8.4)
PROTHROMBIN TIME: 11.3 SEC (ref 9–12.5)

## 2025-02-18 PROCEDURE — 36415 COLL VENOUS BLD VENIPUNCTURE: CPT | Performed by: INTERNAL MEDICINE

## 2025-02-18 PROCEDURE — 82977 ASSAY OF GGT: CPT | Performed by: INTERNAL MEDICINE

## 2025-02-18 PROCEDURE — 84080 ASSAY ALKALINE PHOSPHATASES: CPT | Performed by: INTERNAL MEDICINE

## 2025-02-18 PROCEDURE — 80076 HEPATIC FUNCTION PANEL: CPT | Performed by: INTERNAL MEDICINE

## 2025-02-18 PROCEDURE — 85610 PROTHROMBIN TIME: CPT | Performed by: INTERNAL MEDICINE

## 2025-02-20 ENCOUNTER — OFFICE VISIT (OUTPATIENT)
Dept: HEPATOLOGY | Facility: CLINIC | Age: 45
End: 2025-02-20
Payer: COMMERCIAL

## 2025-02-20 ENCOUNTER — PROCEDURE VISIT (OUTPATIENT)
Dept: HEPATOLOGY | Facility: CLINIC | Age: 45
End: 2025-02-20
Payer: COMMERCIAL

## 2025-02-20 ENCOUNTER — RESULTS FOLLOW-UP (OUTPATIENT)
Dept: HEPATOLOGY | Facility: CLINIC | Age: 45
End: 2025-02-20

## 2025-02-20 VITALS
BODY MASS INDEX: 21.89 KG/M2 | SYSTOLIC BLOOD PRESSURE: 132 MMHG | DIASTOLIC BLOOD PRESSURE: 73 MMHG | WEIGHT: 115.94 LBS | HEART RATE: 72 BPM | OXYGEN SATURATION: 99 % | RESPIRATION RATE: 18 BRPM | HEIGHT: 61 IN

## 2025-02-20 DIAGNOSIS — Z83.79 FAMILY HISTORY OF LIVER DISEASE: ICD-10-CM

## 2025-02-20 DIAGNOSIS — R79.89 ELEVATED LIVER FUNCTION TESTS: ICD-10-CM

## 2025-02-20 DIAGNOSIS — K50.018 CROHN'S DISEASE OF SMALL INTESTINE WITH OTHER COMPLICATION: ICD-10-CM

## 2025-02-20 NOTE — PROCEDURES
FibroScan Transplant Hepatology    Date/Time: 2/20/2025 11:45 AM    Performed by: Sera Macias MA  Authorized by: Natali Soto MD    Diagnosis:  Cholestatic    Probe:  M    Universal Protocol: Patient's identity, procedure and site were verified, confirmatory pause was performed.  Discussed procedure including risks and potential complications.  Questions answered.  Patient verbalizes understanding and wishes to proceed with VCTE.     Procedure: After providing explanations of the procedure, patient was placed in the supine position with right arm in maximum abduction to allow optimal exposure of right lateral abdomen.  Patient was briefly assessed, Testing was performed in the mid-axillary location, 50Hz Shear Wave pulses were applied and the resulting Shear Wave and Propagation Speed detected with a 3.5 MHz ultrasonic signal, using the FibroScan probe, Skin to liver capsule distance and liver parenchyma were accessed during the entire examination with the FibroScan probe, Patient was instructed to breathe normally and to abstain from sudden movements during the procedure, allowing for random measurements of liver stiffness. At least 10 Shear Waves were produced, Individual measurements of each Shear Wave were calculated.  Patient tolerated the procedure well with no complications.  Meets discharge criteria as was dismissed.  Rates pain 0 out of 10.  Patient will follow up with ordering provider to review results.    Findings  Median liver stiffness score:  5.5  CAP Reading: dB/m:  248    IQR/med %:  9  Interpretation  Fibrosis interpretation is based on medial liver stiffness - Kilopascal (kPa).    Fibrosis Stage:  F 0-1  Steatosis interpretation is based on controlled attenuation parameter - (dB/m).    Steatosis Grade:  <S1

## 2025-02-20 NOTE — PROGRESS NOTES
Hepatology Follow Up Note    Referring provider: No ref. provider found  PCP: Luis Madden DO    Chief complaint: follow up elevated liver enzymes     Last seen in clinic on: 1/10/2024    Brief HPI:  Ivy Salgado is a 44 y.o. female with elevated liver enzymes, Crohn's disease s/p total proctocolectomy and end ileostomy (2012), who presents for scheduled follow up.    Interval Events:  - Feel ok.   - Reports intermittent pruritus a few times a week. Has not noticed a particular time of the day where pruritus is worse.  - The patient denies scleral icterus, jaundice.  - Has had 3 SBO in the last year. Patient scheduled for small bowel resection in March 2025.  - This is the extent of the patient's complaints at this time.    Past Medical History:   Diagnosis Date    Abnormal Pap smear 2007    Alkaline phosphatase elevation- based on lab from 4/9/2019 05/28/2019    Arthritis     Avascular necrosis of bone of hip, left     Bacterial vaginosis     Crohn disease     Depression 08/05/2017    Drug-induced pancreatitis (imuran)     Encounter for blood transfusion     Generalized anxiety disorder     Genital HSV     GERD (gastroesophageal reflux disease)     Hypertension     Ileostomy in place 07/09/2012    Kidney stone     Long QT syndrome     Melanoma in situ (excised-buttocks 2018, para-stomal site 2019)     Moderate episode of recurrent major depressive disorder 02/02/2024    Multiple thyroid nodules 11/27/2018    Osteopenia of multiple sites 01/07/2019    Pancreas cyst (tail, 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)     Recurrent Clostridioides difficile diarrhea     Recurrent UTI 04/03/2013       Past Surgical History:   Procedure Laterality Date    ABDOMINAL SURGERY      APPENDECTOMY      ARTHROPLASTY OF SHOULDER Left 7/18/2023    Procedure: ARTHROPLASTY, SHOULDER;  Surgeon: Sharon Babb MD;  Location: [facility];  Service: Orthopedics;  Laterality: Left;    AUGMENTATION OF BREAST Bilateral 06/2022    gel implants    BILATERAL  SALPINGO-OOPHORECTOMY (BSO) Bilateral 05/30/2019    Procedure: SALPINGO-OOPHORECTOMY, BILATERAL;  Surgeon: Rupa German MD;  Location: [facility] OR 2ND FLR;  Service: OB/GYN;  Laterality: Bilateral;    BLADDER SURGERY      partial cystectomy due to fistula    breast lift      BREAST SURGERY      [facility]      COLON SURGERY      COLONOSCOPY      CYSTOGRAM  12/11/2024    Procedure: CYSTOGRAM;  Surgeon: Lexi Villalba MD;  Location: [facility] OR;  Service: Urology;;    CYSTOSCOPY  09/23/2020    Procedure: CYSTOSCOPY;  Surgeon: Sascha Florentino MD;  Location: [facility] OR 1ST FLR;  Service: Urology;;    CYSTOSCOPY N/A 12/11/2024    Procedure: CYSTOSCOPY;  Surgeon: Lexi Villalba MD;  Location: [facility] OR;  Service: Urology;  Laterality: N/A;    CYSTOSCOPY W/ URETERAL STENT PLACEMENT Left 09/15/2020    Procedure: CYSTOSCOPY, WITH URETERAL STENT INSERTION;  Surgeon: Sascha Florentino MD;  Location: [facility] OR 1ST FLR;  Service: Urology;  Laterality: Left;    DIAGNOSTIC LAPAROSCOPY N/A 07/09/2020    Procedure: LAPAROSCOPY, DIAGNOSTIC;  Surgeon: Gilson El MD;  Location: [facility] OR;  Service: OB/GYN;  Laterality: N/A;    ESOPHAGOGASTRODUODENOSCOPY N/A 1/3/2024    Procedure: EGD (ESOPHAGOGASTRODUODENOSCOPY);  Surgeon: Lily Lind MD;  Location: [facility] (2ND FLR);  Service: Endoscopy;  Laterality: N/A;    EXCISION OF MELANOMA  07/17/2019    ILEOSCOPY N/A 1/3/2024    Procedure: ILEOSCOPY;  Surgeon: Lily Lind MD;  Location: [facility] ENDO (2ND FLR);  Service: Endoscopy;  Laterality: N/A;    ILEOSCOPY N/A 1/24/2024    Procedure: ILEOSCOPY;  Surgeon: Lilian Navarro MD;  Location: [facility] ENDO (2ND FLR);  Service: Endoscopy;  Laterality: N/A;  through stoma    ILEOSCOPY N/A 6/4/2024    Procedure: ILEOSCOPY;  Surgeon: Peace Garcia MD;  Location: [facility] ENDO (4TH FLR);  Service: Endoscopy;  Laterality: N/A;  Ref By:,instr sent via portal,AC  received urgent message to reschedule pt from 7/15  to may or  june-updated prep instructions sent-   5/29/24- precall complete - ERW    ILEOSCOPY N/A 12/2/2024    Procedure: ILEOSCOPY;  Surgeon: Peace Garcia MD;  Location: [facility] (4TH FLR);  Service: Endoscopy;  Laterality: N/A;  Ref By:,portal,half [email or web address]  11/22 lvm for precall-st  11/27/24 attempted precall no answer LVM MARI  11/29-LVM for pre call.  No answe.-JM/ES  11/29-pt lvm confirming appt-KPvt    ILEOSTOMY      INSERTION OF TUNNELED CENTRAL VENOUS CATHETER (CVC) WITH SUBCUTANEOUS PORT Right 9/10/2024    Procedure: INSERTION, SINGLE LUMEN CATHETER WITH PORT, WITH FLUOROSCOPIC GUIDANCE, Rt neck or chest, prefers chest;  Surgeon: Vinh Lloyd MD;  Location: [facility] OR 2ND FLR;  Service: General;  Laterality: Right;    LAPAROSCOPIC LYSIS OF ADHESIONS N/A 07/09/2020    Procedure: LYSIS, ADHESIONS, LAPAROSCOPIC;  Surgeon: Gilson El MD;  Location: [facility] OR;  Service: OB/GYN;  Laterality: N/A;    LASER LITHOTRIPSY  09/23/2020    Procedure: LITHOTRIPSY, USING LASER;  Surgeon: Sascha Florentino MD;  Location: [facility] OR 1ST FLR;  Service: Urology;;    LYSIS OF ADHESIONS N/A 05/30/2019    Procedure: LYSIS, ADHESIONS;  Surgeon: Rupa German MD;  Location: [facility] OR 2ND FLR;  Service: OB/GYN;  Laterality: N/A;    MEDIPORT REMOVAL Left 9/10/2024    Procedure: REMOVAL, CATHETER, CENTRAL VENOUS, TUNNELED, WITH PORT, Left side;  Surgeon: Vinh Lloyd MD;  Location: [facility] OR 2ND FLR;  Service: General;  Laterality: Left;    OOPHORECTOMY Right 04/16/2015    PORTACATH PLACEMENT  02/21/2017    SKIN BIOPSY      SMALL INTESTINE SURGERY      age 18 Y    TOTAL ABDOMINAL HYSTERECTOMY  04/16/2015    TOTAL COLECTOMY      TUBAL LIGATION  06/06/2012    UPPER GASTROINTESTINAL ENDOSCOPY      URETEROSCOPIC REMOVAL OF URETERIC CALCULUS  09/23/2020    Procedure: REMOVAL, CALCULUS, URETER, URETEROSCOPIC;  Surgeon: Sascha Florentino MD;  Location: [facility] OR [address] [place] [address];  Service: Urology;;    URETEROSCOPY Left 09/23/2020     "Procedure: URETEROSCOPY;  Surgeon: Sascha Florentino MD;  Location: [address];  Service: Urology;  Laterality: Left;       Family History   Problem Relation Name Age of Onset    Diabetes Paternal Grandfather Stephen     Hearing loss Paternal Grandmother Viviane     Cancer Maternal Grandfather Philippe         Skin    Skin cancer Maternal Grandfather Philippe     Diabetes Maternal Grandfather Philippe     Heart disease Maternal Grandfather Philippe     Colon cancer Father Milan     Cancer Father Milan         Colon    Liver cancer Father Milan     Hyperlipidemia Father Milan     Hypertension Mother Shaila     Crohn's disease Brother      Endometrial cancer Maternal Aunt      Breast cancer Maternal Cousin  41    Crohn's disease Daughter      Ovarian cancer Neg Hx      Melanoma Neg Hx         Social History[1]    Current Medications[2]    Review of patient's allergies indicates:   Allergen Reactions    Azathioprine sodium Other (See Comments)     Other reaction(s): pancreatitis  Other reaction(s): pancreatitis    Methotrexate analogues Other (See Comments)     leukopenia    Stelara [ustekinumab] Other (See Comments)     Multiple infections    Zofran [ondansetron hcl (pf)] Other (See Comments)     Per patient causes prolong QT    Vancomycin analogues Other (See Comments)     Made her red    Azathioprine      Other reaction(s): Unknown    Methotrexate      Other reaction(s): infection-    Morphine Itching and Other (See Comments)     Other reaction(s): Itching    Zofran [ondansetron hcl]      Other reaction(s): Hives    Bactrim [sulfamethoxazole-trimethoprim] Palpitations    Ciprofloxacin Palpitations            Vitals:    02/20/25 1108   BP: 132/73   Pulse: 72   Resp: 18   SpO2: 99%   Weight: 52.6 kg (115 lb 15.4 oz)   Height: 5' 1" (1.549 m)     Body mass index is 21.91 kg/m².    Physical Exam  Constitutional:       General: She is not in acute distress.     Appearance: She is normal weight. She is not " toxic-appearing.   Eyes:      General: No scleral icterus.  Cardiovascular:      Rate and Rhythm: Normal rate.   Pulmonary:      Effort: Pulmonary effort is normal.   Abdominal:      General: Abdomen is flat. There is no distension.      Palpations: Abdomen is soft. There is no fluid wave, hepatomegaly or splenomegaly.   Musculoskeletal:         General: No swelling.   Skin:     General: Skin is warm.      Coloration: Skin is not jaundiced.   Neurological:      General: No focal deficit present.      Mental Status: She is alert.   Psychiatric:         Thought Content: Thought content normal.         LABS: I personally reviewed pertinent laboratory findings.    Lab Results   Component Value Date    WBC 15.59 (H) 01/27/2025    HGB 13.0 01/27/2025    HCT 40 01/27/2025    MCV 84 01/27/2025     01/27/2025       Lab Results   Component Value Date     01/28/2025    K 3.8 01/28/2025     01/28/2025    CO2 26 01/28/2025    BUN 9 01/28/2025    CREATININE 0.7 01/28/2025    CALCIUM 9.2 01/28/2025    ANIONGAP 9 01/28/2025    ESTGFRAFRICA >60.0 03/24/2022    EGFRNONAA >60.0 03/24/2022       Lab Results   Component Value Date    ALT 35 02/18/2025    AST 44 (H) 02/18/2025    GGT 62 (H) 02/18/2025    ALKPHOS 143 02/18/2025    BILITOT 0.4 02/18/2025       Lab Results   Component Value Date    HEPAIGM Non-reactive 03/04/2024    HEPBIGM Non-reactive 03/04/2024    HEPBCAB Non-reactive 12/20/2024    HEPCAB Non-reactive 01/27/2025       Lab Results   Component Value Date    SMOOTHMUSCAB Negative 1:40 01/02/2024    CERULOPLSM 40.0 01/04/2024        Computed MELD 3.0 unavailable. One or more values for this score either were not found within the given timeframe or did not fit some other criterion.  Computed MELD-Na unavailable. One or more values for this score either were not found within the given timeframe or did not fit some other criterion.       IMAGING: I personally reviewed imaging studies.      Assessment:  Ivy  Nubia Salgado is a 44 y.o. female with elevated liver enzymes, Crohn's disease s/p total proctocolectomy and end ileostomy (2012), who presents for scheduled follow up.    Per chart review, liver enzymes have been intermittently elevated in a cholestatic pattern (ALP ~2x ULN). Most recently ALP has normalized. Serological workup for chronic liver disease is notable for negative autoimmune serologies, and negative viral hepatitis. On MRI abd w/wo con + MRCP (1/4/24), the liver is mildly enlarged with homogenous background signal, biliary tree is unremarkable, spleen is normal. Repeat MRI abd w/wo contrast (12/4/24) without evidence of biliary pathology.    Etiology of intermittent ALP is unclear at this time. While small duct PSC is a consideration, I would not expect such elevations and subsequent normalizations in ALP. Another consideration is intestinal ALP elevation in the setting of IBD flares.     Reassuringly, ALP is currently normal. Will obtain non-invasive measures of fibrosis today, and will monitor LFTs closely. Will discuss liver biopsy in the near future.    1. Elevated liver function tests    2. Crohn's disease of ileum with end ileostomy    3. Family history of liver disease      Recommendations:  - LFTs, INR q3 months  - Consider ALP isoenzymes  - FibroScan today  - Consider liver biopsy    Return to clinic in 3 months, virtual ok.    Natali Soto MD  Staff Physician  Hepatology and Liver Transplant  [facility]  [facility]           [1]   Social History  Tobacco Use    Smoking status: Never     Passive exposure: Past    Smokeless tobacco: Never   Substance Use Topics    Alcohol use: Not Currently     Alcohol/week: 0.0 standard drinks of alcohol    Drug use: No   [2]   Current Outpatient Medications   Medication Sig Dispense Refill    clonazePAM (KLONOPIN) 1 MG tablet Take 1 tablet (1 mg total) by mouth 2 (two) times daily. 60 tablet 2     cyclobenzaprine (FLEXERIL) 10 MG tablet Take 1 tablet (10 mg total) by mouth every evening as needed for muscle pain 30 tablet 2    droNABinol (MARINOL) 10 MG capsule Take 1 capsule (10 mg total) by mouth once daily. 90 capsule 2    estradiol 0.025 mg/24 hr 0.025 mg/24 hr Place 1 patch onto the skin once a week. CHANGES ON MONDAYS 4 patch 11    gabapentin (NEURONTIN) 300 MG capsule Take 1 capsule (300 mg total) by mouth 2 (two) times daily. 60 capsule 4    loperamide (IMODIUM) 1 mg/7.5 mL solution Take 4 mg by mouth 3 (three) times daily as needed for Diarrhea.      mirtazapine (REMERON SOL-TAB) 30 MG disintegrating tablet Take 1 tablet (30 mg total) by mouth nightly. 90 tablet 2    multivitamin (THERAGRAN) per tablet Take 1 tablet by mouth once daily.      nadoloL (CORGARD) 40 MG tablet Take 1 tablet (40 mg total) by mouth once daily. Patient needs appointment before next refill. 90 tablet 7    pantoprazole (PROTONIX) 40 MG tablet Take 1 tablet (40 mg total) by mouth 2 (two) times daily. 60 tablet 12    promethazine (PHENERGAN) 25 MG tablet Take 1 tablet (25 mg total) by mouth every 6 (six) hours as needed for Nausea. 30 tablet 0    valACYclovir (VALTREX) 500 MG tablet Take 1 tablet (500 mg total) by mouth once daily. 90 tablet 1    vedolizumab (ENTYVIO) 300 mg SolR injection Inject 300 mg into the vein every 8 weeks.      zolpidem (AMBIEN) 10 mg Tab Take 1 tablet (10 mg total) by mouth every evening. 30 tablet 2    tamsulosin (FLOMAX) 0.4 mg Cap Take 1 capsule (0.4 mg total) by mouth once daily. 30 capsule 1     No current facility-administered medications for this visit.

## 2025-02-21 ENCOUNTER — RESULTS FOLLOW-UP (OUTPATIENT)
Dept: ELECTROPHYSIOLOGY | Facility: CLINIC | Age: 45
End: 2025-02-21
Payer: COMMERCIAL

## 2025-02-21 NOTE — ASSESSMENT & PLAN NOTE
Crohn's Disease  She believes her current symptoms are similar to her crohn's flares in the past. She has not been on maintenance therapy for her crohn's since Feb/March due to side effects.     - Reports persistent increased stool output despite anti-diarrheals   - monitor stoma output, notify if >2000mL/day   - increased loperamide frequency   - GI consulted, appreciate recs   - c/w budesonide 9mg daily   - MRE without signs of active inflammation   - c/w loperamide (IMODIUM) 2 mg capsule, lomotil   - appears non-toxic, afebrile without leukocytosis  - stool studies completed 8/28   - C. Diff, Shigella, stool culture unremarkable  - promethazine (PHENERGAN) 25 MG tablet PRN   - monitor for signs of infection   Currently I/P  Letter sent

## 2025-02-25 ENCOUNTER — PATIENT MESSAGE (OUTPATIENT)
Dept: SURGERY | Facility: CLINIC | Age: 45
End: 2025-02-25
Payer: COMMERCIAL

## 2025-02-25 ENCOUNTER — PATIENT MESSAGE (OUTPATIENT)
Dept: INTERNAL MEDICINE | Facility: CLINIC | Age: 45
End: 2025-02-25
Payer: COMMERCIAL

## 2025-02-25 DIAGNOSIS — K50.018 CROHN'S DISEASE OF SMALL INTESTINE WITH OTHER COMPLICATION: ICD-10-CM

## 2025-02-25 RX ORDER — GABAPENTIN 300 MG/1
300 CAPSULE ORAL 2 TIMES DAILY
Qty: 60 CAPSULE | Refills: 4 | Status: SHIPPED | OUTPATIENT
Start: 2025-02-25

## 2025-02-26 LAB — ALP SERPL-CCNC: 144 U/L (ref 31–125)

## 2025-02-28 ENCOUNTER — OFFICE VISIT (OUTPATIENT)
Dept: SURGERY | Facility: CLINIC | Age: 45
End: 2025-02-28
Payer: COMMERCIAL

## 2025-02-28 VITALS
SYSTOLIC BLOOD PRESSURE: 140 MMHG | HEIGHT: 61 IN | WEIGHT: 119.94 LBS | DIASTOLIC BLOOD PRESSURE: 87 MMHG | BODY MASS INDEX: 22.64 KG/M2 | HEART RATE: 75 BPM

## 2025-02-28 DIAGNOSIS — K56.50 SMALL BOWEL OBSTRUCTION DUE TO ADHESIONS: Primary | ICD-10-CM

## 2025-02-28 PROCEDURE — 99999 PR PBB SHADOW E&M-EST. PATIENT-LVL IV: CPT | Mod: PBBFAC,,, | Performed by: COLON & RECTAL SURGERY

## 2025-02-28 NOTE — PROGRESS NOTES
CRS Office Visit Follow-up  Referring Md:   No referring provider defined for this encounter.    SUBJECTIVE:     Chief Complaint: small bowel obstruction    History of Present Illness:  Patient is a 44 y.o. female presents with recent small bowel obstruction. The patient is a established patient to this practice.     Course is as follows:  PMHx of Crohn's with surgical history notable for completion proctocolectomy with end ileostomy 6/2012 with Dr. Parsons.   She was recently admitted for small bowel obstruction in April 2024 and in October 2024.  Most recent episode in October 2024 was more severe than prior episodes and required 5 days to recover    Current Crohn's staging:   MRE 5/17/24: Short-segmented stricture with signs of mild active inflammation adjacent to the right lower quadrant surgical anastomosis. Tethering and distortion makes an underlying fistula difficult to exclude.   Ileoscopy: 6/4/24  Impression:            - Patent end ileostomy with healthy appearing  mucosa in the terminal ileum.                          - The examined portion of the ileum was normal.  Biopsied.   Medication: Entyvio    7/19/2020: Laparoscopic lysis of adhesions   5/30/2019: Exploratory laparotomy, lysis of adhesions, bilateral salpingo-oophorectomy/mass resection   4/16/2015:  Total abdominal hysterectomy, right salpingo-oophorectomy, repair of the bladder adhesiolysis.  The left tube and ovary could not be identified.  6/12/2012: open completion proctocolectomy with end ileostomy  Prior to 2012: ileocolic resection.   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  She continues to have ileostomy output.  Occasional bloating.  Due to the ongoing pain and concern for ongoing obstruction, she presents for evaluation    2/28/25:  "presents with recurrent SBO x 3 over the past year.  Most recently hospitalized 1/27/25 to 1/29/25.  She presents for preoperative discussion regarding lysis of adhesions and possible small bowel resection.  She is on a predominantly liquid diet with intermittent obstructive symptoms.    Review of Systems:  Review of Systems   Constitutional:  Negative for chills, diaphoresis, fever, malaise/fatigue and weight loss.   HENT:  Negative for congestion.    Respiratory:  Negative for shortness of breath.    Cardiovascular:  Negative for chest pain and leg swelling.   Gastrointestinal:  Positive for abdominal pain and diarrhea. Negative for blood in stool, constipation, nausea and vomiting.   Genitourinary:  Negative for dysuria.   Musculoskeletal:  Negative for back pain and myalgias.   Skin:  Negative for rash.   Neurological:  Negative for dizziness and weakness.   Endo/Heme/Allergies:  Does not bruise/bleed easily.   Psychiatric/Behavioral:  Negative for depression.        OBJECTIVE:     Vital Signs (Most Recent)  BP (!) 140/87 (BP Location: Left arm, Patient Position: Sitting)   Pulse 75   Ht 5' 1" (1.549 m)   Wt 54.4 kg (119 lb 14.9 oz)   LMP 03/30/2015   BMI 22.66 kg/m²     Physical Exam:  General:  or  female in no distress   Neuro: alert and oriented x 4.  Moves all extremities.     HEENT: no icterus.  Trachea midline  Respiratory: respirations are even and unlabored  Cardiac: regular rate  Abdomen:  Multiple incisions well healed.  Abdomen is soft, mildly tender to deep palpation.  Midline incision well healed.  Ostomy in the right mid abdomen healthy appearing.  Extremities: Warm dry and intact  Skin: no rashes  Anorectal:  Deferred    Labs:  H&H 12 and 38.  Normal CRP.  Normal renal function.    Imaging: as above    MRE 11/07/2024: Active Inflammatory CD without luminal Narrowing   ASSESSMENT/PLAN:     Ivy was seen today for follow-up and crohn's disease.    Diagnoses and " all orders for this visit:    Small bowel obstruction due to adhesions          44 y.o. female with Crohn's disease with multiple pelvic adhesions who has previously undergone total proctocolectomy with end ileostomy.  She has had multiple prior pelvic operations including hysterectomy as well as salpingo-oophorectomy and lysis of adhesions.  Last abdominal surgery in 2020.  She presents with recurrent obstructive symptoms.  Last MRE in April with mild inflammation.  MRE and November 24 with active inflammation.  She was evaluated by GI and felt that they had maximally medically managed her.    She appears to have recurrent obstructive symptoms secondary to adhesive disease.  Most recent CT scan with concern for obstruction at the level of her prior small bowel resection.  Discussed laparoscopic lysis of adhesions with the need for possible open lysis of adhesions as well as possible small-bowel resection of her prior anastomosis.    Consents were signed today and all questions answered.    DUNCAN Campbell MD, FACS, FASCRS  Staff Surgeon  Colon & Rectal Surgery

## 2025-03-06 ENCOUNTER — TELEPHONE (OUTPATIENT)
Dept: INTERNAL MEDICINE | Facility: CLINIC | Age: 45
End: 2025-03-06
Payer: COMMERCIAL

## 2025-03-06 DIAGNOSIS — M25.512 CHRONIC LEFT SHOULDER PAIN: ICD-10-CM

## 2025-03-06 DIAGNOSIS — G89.29 CHRONIC LEFT SHOULDER PAIN: ICD-10-CM

## 2025-03-06 DIAGNOSIS — Z98.890 S/P SHOULDER SURGERY: ICD-10-CM

## 2025-03-06 DIAGNOSIS — F41.9 ANXIETY: ICD-10-CM

## 2025-03-06 RX ORDER — CYCLOBENZAPRINE HCL 10 MG
10 TABLET ORAL NIGHTLY
Qty: 30 TABLET | Refills: 2 | Status: SHIPPED | OUTPATIENT
Start: 2025-03-06

## 2025-03-06 RX ORDER — ZOLPIDEM TARTRATE 10 MG/1
10 TABLET ORAL NIGHTLY
Qty: 30 TABLET | Refills: 2 | Status: SHIPPED | OUTPATIENT
Start: 2025-03-06

## 2025-03-06 NOTE — TELEPHONE ENCOUNTER
LVM for pt to reschedule upcoming appt due to provider being out of office that day  (1st attempt)

## 2025-03-06 NOTE — TELEPHONE ENCOUNTER
No care due was identified.  [facility] Embedded Care Due Messages. Reference number: [id number].   3/06/2025 12:43:29 PM CST

## 2025-03-10 ENCOUNTER — PATIENT MESSAGE (OUTPATIENT)
Dept: SURGERY | Facility: CLINIC | Age: 45
End: 2025-03-10
Payer: COMMERCIAL

## 2025-03-10 ENCOUNTER — TELEPHONE (OUTPATIENT)
Dept: SURGERY | Facility: CLINIC | Age: 45
End: 2025-03-10
Payer: COMMERCIAL

## 2025-03-10 DIAGNOSIS — F41.1 GENERALIZED ANXIETY DISORDER: ICD-10-CM

## 2025-03-10 RX ORDER — MIRTAZAPINE 30 MG/1
30 TABLET, ORALLY DISINTEGRATING ORAL NIGHTLY
Qty: 90 TABLET | Refills: 2 | Status: SHIPPED | OUTPATIENT
Start: 2025-03-10

## 2025-03-10 NOTE — TELEPHONE ENCOUNTER
Refill Encounter    PCP Visits: Recent Visits  Date Type Provider Dept   11/20/24 Office Visit Weeks, uLis OCHOA DO [facility]   Showing recent visits within past 360 days and meeting all other requirements  Future Appointments  Date Type Provider Dept   03/17/25 Appointment Weeks, Luis OCHOA DO [facility]   Showing future appointments within next 720 days and meeting all other requirements      Last 3 Blood Pressure:   BP Readings from Last 3 Encounters:   02/28/25 (!) 140/87   02/20/25 132/73   02/13/25 101/65     Preferred Pharmacy:   [facility] [address] [place] [address]  Phone: [phone] Fax: [phone]    Requested RX:  Requested Prescriptions     Pending Prescriptions Disp Refills    mirtazapine (REMERON SOL-TAB) 30 MG disintegrating tablet 90 tablet 2     Sig: Take 1 tablet (30 mg total) by mouth nightly.      RX Route: Normal

## 2025-03-10 NOTE — TELEPHONE ENCOUNTER
No care due was identified.  [facility] Embedded Care Due Messages. Reference number: [id number].   3/10/2025 12:20:35 AM CDT

## 2025-03-16 DIAGNOSIS — K50.819 CROHN'S DISEASE OF BOTH SMALL AND LARGE INTESTINE WITH COMPLICATION: Primary | ICD-10-CM

## 2025-03-17 RX ORDER — DRONABINOL 10 MG/1
10 CAPSULE ORAL DAILY
Qty: 90 CAPSULE | Refills: 2 | Status: SHIPPED | OUTPATIENT
Start: 2025-03-17 | End: 2025-12-12

## 2025-03-17 NOTE — TELEPHONE ENCOUNTER
Refill Encounter    PCP Visits: Recent Visits  Date Type Provider Dept   11/20/24 Office Visit Luis Madden DO [facility] Internal Medicine   Showing recent visits within past 360 days and meeting all other requirements  Future Appointments  No visits were found meeting these conditions.  Showing future appointments within next 720 days and meeting all other requirements      Last 3 Blood Pressure:   BP Readings from Last 3 Encounters:   02/28/25 (!) 140/87   02/20/25 132/73   02/13/25 101/65     Preferred Pharmacy:   [facility] [address] [place] [address]  Phone: [phone] Fax: [phone]    Requested RX:  Requested Prescriptions     Pending Prescriptions Disp Refills    droNABinol (MARINOL) 10 MG capsule 90 capsule 2     Sig: Take 1 capsule (10 mg total) by mouth once daily.      RX Route: Normal

## 2025-03-17 NOTE — PRE-PROCEDURE INSTRUCTIONS
PreOp Instructions given:     -- Medication information (what to hold and what to take)   -- NPO guidelines as follows: (or as per your Surgeon)  Stop ALL solid food, gum, candy 8 hours before arrival time.  Stop all CLOUDY liquids: coffee with creamer, cloudy juices, 8 hours prior to arrival time.  The patient should be ENCOURAGED to drink carbohydrate-rich clear liquids (sports drinks, clear juices) until 2 hours prior to arrival time.  Stop clear liquids 2 hours prior to arrival time.  CLEAR liquids include water, black coffee NO creamer, clear oral rehydration drinks, clear sports drinks and clear fruit juices (no orange juice, no pulpy juices, no apple cider).   IF IN DOUBT, drink water instead.   NOTHING TO DRINK 2 hours before to surgery/procedure  time. If you are told to take medication on the morning of surgery, it may be taken with a sip of water.   -- *Arrival place and directions given*.  Time to be given the day before procedure by the Surgeon's Office   -- Bathe with antibacterial soap (dial or Hibiclens as instructed)  -- Don't wear any jewelry or valuables and not metals on skin or hair AM of surgery   -- No makeup or moisturizer to face   -- No perfume/cologne, powder, lotions, aftershave or deodorant     Pt verbalized understanding.            *If going to , see below:      Directions and Instructions for [facility] [place]   At [facility], we have an outstanding team of physicians, anesthesiologists, CRNAs, Registered Nurses, Surgical Technologists, and other ancillary team members all focused on your surgical and procedural care.   Before Your Procedure:   The physician's office will call you with a specific arrival time and directions a day or two before your scheduled procedure. You may also receive these instructions through your [facility] portal.   Day of Procedure:   Please be sure to arrive at the arrival time given or you may risk your surgery being delayed  or canceled. The arrival time is earlier than your scheduled surgery or procedure time. In the winter months please dress warm and bring blankets for you or your child as the waiting room may be cold. If you have difficulty locating the facility, please give us a call at [phone].   Directions:   The [facility] is located on the 1st floor of the hospital building near the [place] entrance.   Parking:   You will park in the [facility] Parking Garage (note location on map). [facility] opens at 5:00 a.m. and has a drop off area by the entrance.  parking is available starting at 7:00 a.m. Please see below for further  parking instructions.   Directions from the parking garage elevators   Blue [facility] Elevators: From the parking garage, take the blue [place] elevators (located in the center of the parking garage) to the 1st floor of the garage. You will then take a right once off the elevators then another right to the outside of the parking garage. You will be across from the [facility]. You will walk down the sidewalk, pass the  curve at the [place] entrance and continue to follow the sidewalk. You will pass the radiation oncology entrance on your right. Continue to follow the sidewalk to the [facility] glass door entrance.   Hospital Entrance (Inside Route): If a mostly inside route is preferred: Take the inside elevator bank (located at the far north end of the garage) from the parking garage to the 1st floor. On the 1st floor walk past [employer]. Keep walking down the center of the hallway towards the hospital elevators. Once you reach the red brick omid, take a left and go past the hospital elevators. Take another left and follow the blue and white [place] signs around the hallway to the end. Go outside of the door. You will see the [facility] entrance to your right.   Drop Off:   There is a drop off area  at the doors of the [facility] for your convenience. If utilized for pediatric patients, an adult must accompany the patient into the surgery center while another adult nicole the vehicle.   [place] (at 7:00 a.m.):   Upon check-in, please let the  know that you are utilizing [employer] parking which is free. The . will then call  for your car to be picked up. Your keys and phone number will be collected and given to [employer] services. You will then be given a ticket. Upon discharge,  will be notified to bring your vehicle back when you are ready.           Directions to [facility] [place]:  [phone]     From 1st floor garage elevators: go past [facility] [employer] shop. Look for Black piano on side of coffee shop. Take Atrium (gold) elevator by piano up to 2nd floor. When you exit elevator follow long hallway (you should see a sign hanging from the ceiling that says Day of Surgery Family Waiting Room. When hallway ends you will be entering the day of surgery waiting area. Check in at the desk for your procedure.      From [facility] entrance: Make a right after you enter the door to the hospital. On your Left, take [place] elevator to 2nd floor. Check in at desk      From Lab desk on 2nd floor: Exit lab area toward hospital atrium. You should see a sign that says Day of Surgery Family Waiting Area. Make a Left and follow long hallway (you should see a sign hanging from the ceiling that says Day of Surgery Family Waiting Room. When hallway ends you will be entering the day of surgery waiting area. Check in at the desk for your procedure.

## 2025-03-18 ENCOUNTER — PATIENT MESSAGE (OUTPATIENT)
Dept: SURGERY | Facility: CLINIC | Age: 45
End: 2025-03-18
Payer: COMMERCIAL

## 2025-03-18 ENCOUNTER — TELEPHONE (OUTPATIENT)
Dept: SURGERY | Facility: CLINIC | Age: 45
End: 2025-03-18
Payer: COMMERCIAL

## 2025-03-19 ENCOUNTER — ANESTHESIA (OUTPATIENT)
Dept: SURGERY | Facility: HOSPITAL | Age: 45
End: 2025-03-19
Payer: COMMERCIAL

## 2025-03-19 ENCOUNTER — ANESTHESIA EVENT (OUTPATIENT)
Dept: SURGERY | Facility: HOSPITAL | Age: 45
End: 2025-03-19
Payer: COMMERCIAL

## 2025-03-19 ENCOUNTER — HOSPITAL ENCOUNTER (INPATIENT)
Facility: HOSPITAL | Age: 45
LOS: 2 days | Discharge: HOME | DRG: 330 | End: 2025-03-21
Attending: COLON & RECTAL SURGERY | Admitting: COLON & RECTAL SURGERY
Payer: COMMERCIAL

## 2025-03-19 DIAGNOSIS — K56.609 SBO (SMALL BOWEL OBSTRUCTION): Primary | ICD-10-CM

## 2025-03-19 LAB
ABO + RH BLD: NORMAL
BLD GP AB SCN CELLS X3 SERPL QL: NORMAL
SPECIMEN OUTDATE: NORMAL

## 2025-03-19 PROCEDURE — 86850 RBC ANTIBODY SCREEN: CPT | Performed by: NURSE PRACTITIONER

## 2025-03-19 PROCEDURE — 88313 SPECIAL STAINS GROUP 2: CPT | Mod: 59 | Performed by: PATHOLOGY

## 2025-03-19 PROCEDURE — 25000003 PHARM REV CODE 250

## 2025-03-19 PROCEDURE — 25000003 PHARM REV CODE 250: Performed by: STUDENT IN AN ORGANIZED HEALTH CARE EDUCATION/TRAINING PROGRAM

## 2025-03-19 PROCEDURE — C1758 CATHETER, URETERAL: HCPCS | Performed by: COLON & RECTAL SURGERY

## 2025-03-19 PROCEDURE — 0DB80ZZ EXCISION OF SMALL INTESTINE, OPEN APPROACH: ICD-10-PCS | Performed by: COLON & RECTAL SURGERY

## 2025-03-19 PROCEDURE — 0T788DZ DILATION OF BILATERAL URETERS WITH INTRALUMINAL DEVICE, VIA NATURAL OR ARTIFICIAL OPENING ENDOSCOPIC: ICD-10-PCS | Performed by: STUDENT IN AN ORGANIZED HEALTH CARE EDUCATION/TRAINING PROGRAM

## 2025-03-19 PROCEDURE — 25000003 PHARM REV CODE 250: Performed by: NURSE PRACTITIONER

## 2025-03-19 PROCEDURE — 0DNW0ZZ RELEASE PERITONEUM, OPEN APPROACH: ICD-10-PCS | Performed by: COLON & RECTAL SURGERY

## 2025-03-19 PROCEDURE — 63600175 PHARM REV CODE 636 W HCPCS

## 2025-03-19 PROCEDURE — 88307 TISSUE EXAM BY PATHOLOGIST: CPT | Mod: 26,,, | Performed by: PATHOLOGY

## 2025-03-19 PROCEDURE — 88313 SPECIAL STAINS GROUP 2: CPT | Mod: 26,,, | Performed by: PATHOLOGY

## 2025-03-19 PROCEDURE — 27201423 OPTIME MED/SURG SUP & DEVICES STERILE SUPPLY: Performed by: COLON & RECTAL SURGERY

## 2025-03-19 PROCEDURE — 36000710: Performed by: COLON & RECTAL SURGERY

## 2025-03-19 PROCEDURE — C1769 GUIDE WIRE: HCPCS | Performed by: COLON & RECTAL SURGERY

## 2025-03-19 PROCEDURE — 71000015 HC POSTOP RECOV 1ST HR: Performed by: COLON & RECTAL SURGERY

## 2025-03-19 PROCEDURE — 44120 REMOVAL OF SMALL INTESTINE: CPT | Mod: 22,,, | Performed by: COLON & RECTAL SURGERY

## 2025-03-19 PROCEDURE — 63600175 PHARM REV CODE 636 W HCPCS: Performed by: STUDENT IN AN ORGANIZED HEALTH CARE EDUCATION/TRAINING PROGRAM

## 2025-03-19 PROCEDURE — 63600175 PHARM REV CODE 636 W HCPCS: Performed by: NURSE PRACTITIONER

## 2025-03-19 PROCEDURE — C1765 ADHESION BARRIER: HCPCS | Performed by: COLON & RECTAL SURGERY

## 2025-03-19 PROCEDURE — 63600175 PHARM REV CODE 636 W HCPCS: Mod: JZ,TB

## 2025-03-19 PROCEDURE — 37000008 HC ANESTHESIA 1ST 15 MINUTES: Performed by: COLON & RECTAL SURGERY

## 2025-03-19 PROCEDURE — 0WJP4ZZ INSPECTION OF GASTROINTESTINAL TRACT, PERCUTANEOUS ENDOSCOPIC APPROACH: ICD-10-PCS | Performed by: COLON & RECTAL SURGERY

## 2025-03-19 PROCEDURE — 94761 N-INVAS EAR/PLS OXIMETRY MLT: CPT

## 2025-03-19 PROCEDURE — 36000711: Performed by: COLON & RECTAL SURGERY

## 2025-03-19 PROCEDURE — 88307 TISSUE EXAM BY PATHOLOGIST: CPT | Mod: 59 | Performed by: PATHOLOGY

## 2025-03-19 PROCEDURE — 20600001 HC STEP DOWN PRIVATE ROOM

## 2025-03-19 PROCEDURE — 71000016 HC POSTOP RECOV ADDL HR: Performed by: COLON & RECTAL SURGERY

## 2025-03-19 PROCEDURE — 37000009 HC ANESTHESIA EA ADD 15 MINS: Performed by: COLON & RECTAL SURGERY

## 2025-03-19 PROCEDURE — 52005 CYSTO W/URTRL CATHJ: CPT | Mod: ,,, | Performed by: STUDENT IN AN ORGANIZED HEALTH CARE EDUCATION/TRAINING PROGRAM

## 2025-03-19 PROCEDURE — 71000033 HC RECOVERY, INTIAL HOUR: Performed by: COLON & RECTAL SURGERY

## 2025-03-19 PROCEDURE — 63600175 PHARM REV CODE 636 W HCPCS: Performed by: ANESTHESIOLOGY

## 2025-03-19 DEVICE — SEPRAFILM ADHESION BARRIER (MEMBRANE) IS A STERILE, BIORESORBABLE, TRANSLUCENT ADHESION BARRIER COMPOSED OF TWO ANIONIC POLYSACCHARIDES, SODIUM HYALURONATE (HA) AND CARBOXYMETHYLCELLULOSE (CMC).
Type: IMPLANTABLE DEVICE | Site: ABDOMEN | Status: FUNCTIONAL
Brand: SEPRAFILM

## 2025-03-19 RX ORDER — GABAPENTIN 300 MG/1
300 CAPSULE ORAL 3 TIMES DAILY
Status: DISCONTINUED | OUTPATIENT
Start: 2025-03-19 | End: 2025-03-19

## 2025-03-19 RX ORDER — IBUPROFEN 400 MG/1
800 TABLET ORAL EVERY 8 HOURS
Status: DISCONTINUED | OUTPATIENT
Start: 2025-03-20 | End: 2025-03-19

## 2025-03-19 RX ORDER — LIDOCAINE HYDROCHLORIDE 20 MG/ML
INJECTION INTRAVENOUS
Status: DISCONTINUED | OUTPATIENT
Start: 2025-03-19 | End: 2025-03-19

## 2025-03-19 RX ORDER — ACETAMINOPHEN 500 MG
1000 TABLET ORAL EVERY 8 HOURS
Status: DISCONTINUED | OUTPATIENT
Start: 2025-03-20 | End: 2025-03-21 | Stop reason: HOSPADM

## 2025-03-19 RX ORDER — DEXAMETHASONE SODIUM PHOSPHATE 4 MG/ML
INJECTION, SOLUTION INTRA-ARTICULAR; INTRALESIONAL; INTRAMUSCULAR; INTRAVENOUS; SOFT TISSUE
Status: DISCONTINUED | OUTPATIENT
Start: 2025-03-19 | End: 2025-03-19

## 2025-03-19 RX ORDER — LIDOCAINE HYDROCHLORIDE ANHYDROUS AND DEXTROSE MONOHYDRATE .8; 5 G/100ML; G/100ML
INJECTION, SOLUTION INTRAVENOUS CONTINUOUS PRN
Status: DISCONTINUED | OUTPATIENT
Start: 2025-03-19 | End: 2025-03-19

## 2025-03-19 RX ORDER — TRIPROLIDINE/PSEUDOEPHEDRINE 2.5MG-60MG
600 TABLET ORAL
Status: COMPLETED | OUTPATIENT
Start: 2025-03-19 | End: 2025-03-19

## 2025-03-19 RX ORDER — HEPARIN SODIUM 5000 [USP'U]/ML
5000 INJECTION, SOLUTION INTRAVENOUS; SUBCUTANEOUS ONCE
Status: COMPLETED | OUTPATIENT
Start: 2025-03-19 | End: 2025-03-19

## 2025-03-19 RX ORDER — KETAMINE HCL IN 0.9 % NACL 50 MG/5 ML
SYRINGE (ML) INTRAVENOUS
Status: DISCONTINUED | OUTPATIENT
Start: 2025-03-19 | End: 2025-03-19

## 2025-03-19 RX ORDER — MUPIROCIN 20 MG/G
OINTMENT TOPICAL 2 TIMES DAILY
Status: DISCONTINUED | OUTPATIENT
Start: 2025-03-19 | End: 2025-03-21 | Stop reason: HOSPADM

## 2025-03-19 RX ORDER — ACETAMINOPHEN 500 MG
1000 TABLET ORAL EVERY 8 HOURS
Status: DISCONTINUED | OUTPATIENT
Start: 2025-03-20 | End: 2025-03-19

## 2025-03-19 RX ORDER — METRONIDAZOLE 500 MG/100ML
500 INJECTION, SOLUTION INTRAVENOUS
Status: DISCONTINUED | OUTPATIENT
Start: 2025-03-19 | End: 2025-03-19 | Stop reason: HOSPADM

## 2025-03-19 RX ORDER — EPHEDRINE SULFATE 50 MG/ML
INJECTION, SOLUTION INTRAVENOUS
Status: DISCONTINUED | OUTPATIENT
Start: 2025-03-19 | End: 2025-03-19

## 2025-03-19 RX ORDER — FENTANYL CITRATE 50 UG/ML
25 INJECTION, SOLUTION INTRAMUSCULAR; INTRAVENOUS EVERY 5 MIN PRN
Status: COMPLETED | OUTPATIENT
Start: 2025-03-19 | End: 2025-03-19

## 2025-03-19 RX ORDER — GLUCAGON 1 MG
1 KIT INJECTION
Status: DISCONTINUED | OUTPATIENT
Start: 2025-03-19 | End: 2025-03-19 | Stop reason: HOSPADM

## 2025-03-19 RX ORDER — FENTANYL CITRATE 50 UG/ML
INJECTION, SOLUTION INTRAMUSCULAR; INTRAVENOUS
Status: DISCONTINUED | OUTPATIENT
Start: 2025-03-19 | End: 2025-03-19

## 2025-03-19 RX ORDER — ROCURONIUM BROMIDE 10 MG/ML
INJECTION, SOLUTION INTRAVENOUS
Status: DISCONTINUED | OUTPATIENT
Start: 2025-03-19 | End: 2025-03-19

## 2025-03-19 RX ORDER — MIDAZOLAM HYDROCHLORIDE 1 MG/ML
INJECTION INTRAMUSCULAR; INTRAVENOUS
Status: DISCONTINUED | OUTPATIENT
Start: 2025-03-19 | End: 2025-03-19

## 2025-03-19 RX ORDER — PROPOFOL 10 MG/ML
VIAL (ML) INTRAVENOUS
Status: DISCONTINUED | OUTPATIENT
Start: 2025-03-19 | End: 2025-03-19

## 2025-03-19 RX ORDER — ALVIMOPAN 12 MG/1
12 CAPSULE ORAL
Status: COMPLETED | OUTPATIENT
Start: 2025-03-19 | End: 2025-03-19

## 2025-03-19 RX ORDER — OXYCODONE HYDROCHLORIDE 5 MG/1
5 TABLET ORAL EVERY 4 HOURS PRN
Refills: 0 | Status: DISCONTINUED | OUTPATIENT
Start: 2025-03-19 | End: 2025-03-21 | Stop reason: HOSPADM

## 2025-03-19 RX ORDER — MUPIROCIN 20 MG/G
1 OINTMENT TOPICAL
Status: COMPLETED | OUTPATIENT
Start: 2025-03-19 | End: 2025-03-19

## 2025-03-19 RX ORDER — PHENYLEPHRINE HYDROCHLORIDE 10 MG/ML
INJECTION INTRAVENOUS
Status: DISCONTINUED | OUTPATIENT
Start: 2025-03-19 | End: 2025-03-19

## 2025-03-19 RX ORDER — FENTANYL CITRATE 50 UG/ML
INJECTION, SOLUTION INTRAMUSCULAR; INTRAVENOUS
Status: COMPLETED
Start: 2025-03-19 | End: 2025-03-19

## 2025-03-19 RX ORDER — ENOXAPARIN SODIUM 100 MG/ML
40 INJECTION SUBCUTANEOUS EVERY 24 HOURS
Status: DISCONTINUED | OUTPATIENT
Start: 2025-03-20 | End: 2025-03-21 | Stop reason: HOSPADM

## 2025-03-19 RX ORDER — GABAPENTIN 300 MG/1
300 CAPSULE ORAL
Status: COMPLETED | OUTPATIENT
Start: 2025-03-19 | End: 2025-03-19

## 2025-03-19 RX ORDER — OXYCODONE HYDROCHLORIDE 10 MG/1
10 TABLET ORAL EVERY 4 HOURS PRN
Refills: 0 | Status: DISCONTINUED | OUTPATIENT
Start: 2025-03-19 | End: 2025-03-21 | Stop reason: HOSPADM

## 2025-03-19 RX ORDER — PANTOPRAZOLE SODIUM 40 MG/1
40 TABLET, DELAYED RELEASE ORAL 2 TIMES DAILY
Status: DISCONTINUED | OUTPATIENT
Start: 2025-03-19 | End: 2025-03-21 | Stop reason: HOSPADM

## 2025-03-19 RX ORDER — SODIUM CHLORIDE 9 MG/ML
INJECTION, SOLUTION INTRAVENOUS
Status: COMPLETED | OUTPATIENT
Start: 2025-03-19 | End: 2025-03-19

## 2025-03-19 RX ORDER — PROCHLORPERAZINE EDISYLATE 5 MG/ML
5 INJECTION INTRAMUSCULAR; INTRAVENOUS EVERY 6 HOURS PRN
Status: DISCONTINUED | OUTPATIENT
Start: 2025-03-19 | End: 2025-03-21 | Stop reason: HOSPADM

## 2025-03-19 RX ORDER — MIRTAZAPINE 15 MG/1
30 TABLET, ORALLY DISINTEGRATING ORAL NIGHTLY
Status: DISCONTINUED | OUTPATIENT
Start: 2025-03-19 | End: 2025-03-21 | Stop reason: HOSPADM

## 2025-03-19 RX ORDER — GABAPENTIN 300 MG/1
300 CAPSULE ORAL 3 TIMES DAILY
Status: DISCONTINUED | OUTPATIENT
Start: 2025-03-19 | End: 2025-03-21 | Stop reason: HOSPADM

## 2025-03-19 RX ORDER — SODIUM CHLORIDE 0.9 % (FLUSH) 0.9 %
10 SYRINGE (ML) INJECTION
Status: DISCONTINUED | OUTPATIENT
Start: 2025-03-19 | End: 2025-03-21 | Stop reason: HOSPADM

## 2025-03-19 RX ORDER — HYDROMORPHONE HYDROCHLORIDE 2 MG/ML
0.5 INJECTION, SOLUTION INTRAMUSCULAR; INTRAVENOUS; SUBCUTANEOUS ONCE
Status: COMPLETED | OUTPATIENT
Start: 2025-03-19 | End: 2025-03-19

## 2025-03-19 RX ORDER — CEFTRIAXONE 2 G/1
2 INJECTION, POWDER, FOR SOLUTION INTRAMUSCULAR; INTRAVENOUS
Status: COMPLETED | OUTPATIENT
Start: 2025-03-19 | End: 2025-03-19

## 2025-03-19 RX ORDER — FENTANYL CITRATE 50 UG/ML
25 INJECTION, SOLUTION INTRAMUSCULAR; INTRAVENOUS EVERY 5 MIN PRN
Status: DISCONTINUED | OUTPATIENT
Start: 2025-03-19 | End: 2025-03-19 | Stop reason: HOSPADM

## 2025-03-19 RX ORDER — SODIUM CHLORIDE 9 MG/ML
INJECTION, SOLUTION INTRAVENOUS CONTINUOUS
Status: DISCONTINUED | OUTPATIENT
Start: 2025-03-19 | End: 2025-03-19

## 2025-03-19 RX ORDER — ACETAMINOPHEN 10 MG/ML
1000 INJECTION, SOLUTION INTRAVENOUS EVERY 8 HOURS
Status: DISCONTINUED | OUTPATIENT
Start: 2025-03-19 | End: 2025-03-19

## 2025-03-19 RX ORDER — CLONAZEPAM 0.5 MG/1
0.5 TABLET ORAL 2 TIMES DAILY
Status: DISCONTINUED | OUTPATIENT
Start: 2025-03-19 | End: 2025-03-21 | Stop reason: HOSPADM

## 2025-03-19 RX ORDER — IBUPROFEN 400 MG/1
800 TABLET ORAL EVERY 8 HOURS
Status: DISCONTINUED | OUTPATIENT
Start: 2025-03-20 | End: 2025-03-21 | Stop reason: HOSPADM

## 2025-03-19 RX ORDER — ACETAMINOPHEN 10 MG/ML
1000 INJECTION, SOLUTION INTRAVENOUS EVERY 8 HOURS
Status: COMPLETED | OUTPATIENT
Start: 2025-03-19 | End: 2025-03-20

## 2025-03-19 RX ORDER — LIDOCAINE HYDROCHLORIDE 10 MG/ML
1 INJECTION, SOLUTION EPIDURAL; INFILTRATION; INTRACAUDAL; PERINEURAL
Status: DISCONTINUED | OUTPATIENT
Start: 2025-03-19 | End: 2025-03-19 | Stop reason: HOSPADM

## 2025-03-19 RX ORDER — ACETAMINOPHEN 650 MG/20.3ML
975 LIQUID ORAL
Status: COMPLETED | OUTPATIENT
Start: 2025-03-19 | End: 2025-03-19

## 2025-03-19 RX ORDER — SODIUM CHLORIDE 9 MG/ML
INJECTION, SOLUTION INTRAVENOUS CONTINUOUS
Status: DISCONTINUED | OUTPATIENT
Start: 2025-03-19 | End: 2025-03-20

## 2025-03-19 RX ADMIN — OXYCODONE HYDROCHLORIDE 10 MG: 10 TABLET ORAL at 06:03

## 2025-03-19 RX ADMIN — FENTANYL CITRATE 25 MCG: 50 INJECTION INTRAMUSCULAR; INTRAVENOUS at 02:03

## 2025-03-19 RX ADMIN — Medication 10 MG: at 12:03

## 2025-03-19 RX ADMIN — GABAPENTIN 300 MG: 300 CAPSULE ORAL at 09:03

## 2025-03-19 RX ADMIN — DEXAMETHASONE SODIUM PHOSPHATE 8 MG: 4 INJECTION, SOLUTION INTRAMUSCULAR; INTRAVENOUS at 10:03

## 2025-03-19 RX ADMIN — EPHEDRINE SULFATE 10 MG: 50 INJECTION INTRAVENOUS at 10:03

## 2025-03-19 RX ADMIN — PHENYLEPHRINE HYDROCHLORIDE 100 MCG: 10 INJECTION INTRAVENOUS at 10:03

## 2025-03-19 RX ADMIN — FENTANYL CITRATE 25 MCG: 50 INJECTION INTRAMUSCULAR; INTRAVENOUS at 03:03

## 2025-03-19 RX ADMIN — IBUPROFEN 600 MG: 100 SUSPENSION ORAL at 09:03

## 2025-03-19 RX ADMIN — MUPIROCIN 1 G: 20 OINTMENT TOPICAL at 10:03

## 2025-03-19 RX ADMIN — HYDROMORPHONE HYDROCHLORIDE 0.5 MG: 2 INJECTION, SOLUTION INTRAMUSCULAR; INTRAVENOUS; SUBCUTANEOUS at 05:03

## 2025-03-19 RX ADMIN — Medication 10 MG: at 01:03

## 2025-03-19 RX ADMIN — GABAPENTIN 300 MG: 300 CAPSULE ORAL at 08:03

## 2025-03-19 RX ADMIN — SODIUM CHLORIDE, SODIUM GLUCONATE, SODIUM ACETATE, POTASSIUM CHLORIDE, MAGNESIUM CHLORIDE, SODIUM PHOSPHATE, DIBASIC, AND POTASSIUM PHOSPHATE: .53; .5; .37; .037; .03; .012; .00082 INJECTION, SOLUTION INTRAVENOUS at 10:03

## 2025-03-19 RX ADMIN — IBUPROFEN 800 MG: 800 INJECTION INTRAVENOUS at 05:03

## 2025-03-19 RX ADMIN — PROCHLORPERAZINE EDISYLATE 5 MG: 5 INJECTION INTRAMUSCULAR; INTRAVENOUS at 08:03

## 2025-03-19 RX ADMIN — GABAPENTIN 300 MG: 300 CAPSULE ORAL at 02:03

## 2025-03-19 RX ADMIN — GABAPENTIN 300 MG: 300 CAPSULE ORAL at 05:03

## 2025-03-19 RX ADMIN — SUGAMMADEX 200 MG: 100 INJECTION, SOLUTION INTRAVENOUS at 01:03

## 2025-03-19 RX ADMIN — LIDOCAINE HYDROCHLORIDE 0.03 MG/KG/MIN: 8 INJECTION, SOLUTION INTRAVENOUS at 10:03

## 2025-03-19 RX ADMIN — GLYCOPYRROLATE 0.2 MG: 0.2 INJECTION, SOLUTION INTRAMUSCULAR; INTRAVENOUS at 10:03

## 2025-03-19 RX ADMIN — PROPOFOL 150 MG: 10 INJECTION, EMULSION INTRAVENOUS at 10:03

## 2025-03-19 RX ADMIN — ROCURONIUM BROMIDE 40 MG: 10 INJECTION, SOLUTION INTRAVENOUS at 10:03

## 2025-03-19 RX ADMIN — PROPOFOL 20 MG: 10 INJECTION, EMULSION INTRAVENOUS at 01:03

## 2025-03-19 RX ADMIN — SODIUM CHLORIDE: 9 INJECTION, SOLUTION INTRAVENOUS at 02:03

## 2025-03-19 RX ADMIN — PROCHLORPERAZINE EDISYLATE 5 MG: 5 INJECTION INTRAMUSCULAR; INTRAVENOUS at 02:03

## 2025-03-19 RX ADMIN — ROCURONIUM BROMIDE 10 MG: 10 INJECTION, SOLUTION INTRAVENOUS at 12:03

## 2025-03-19 RX ADMIN — OXYCODONE HYDROCHLORIDE 10 MG: 10 TABLET ORAL at 02:03

## 2025-03-19 RX ADMIN — OXYCODONE HYDROCHLORIDE 10 MG: 10 TABLET ORAL at 09:03

## 2025-03-19 RX ADMIN — LIDOCAINE HYDROCHLORIDE 100 MG: 20 INJECTION INTRAVENOUS at 10:03

## 2025-03-19 RX ADMIN — CLONAZEPAM 0.5 MG: 0.5 TABLET ORAL at 08:03

## 2025-03-19 RX ADMIN — MIDAZOLAM HYDROCHLORIDE 2 MG: 2 INJECTION, SOLUTION INTRAMUSCULAR; INTRAVENOUS at 10:03

## 2025-03-19 RX ADMIN — MIRTAZAPINE 30 MG: 15 TABLET, ORALLY DISINTEGRATING ORAL at 09:03

## 2025-03-19 RX ADMIN — HEPARIN SODIUM 5000 UNITS: 5000 INJECTION INTRAVENOUS; SUBCUTANEOUS at 09:03

## 2025-03-19 RX ADMIN — ACETAMINOPHEN 1000 MG: 10 INJECTION, SOLUTION INTRAVENOUS at 05:03

## 2025-03-19 RX ADMIN — CEFTRIAXONE SODIUM 2 G: 2 INJECTION, POWDER, FOR SOLUTION INTRAMUSCULAR; INTRAVENOUS at 10:03

## 2025-03-19 RX ADMIN — ROCURONIUM BROMIDE 10 MG: 10 INJECTION, SOLUTION INTRAVENOUS at 11:03

## 2025-03-19 RX ADMIN — MUPIROCIN: 20 OINTMENT TOPICAL at 09:03

## 2025-03-19 RX ADMIN — PANTOPRAZOLE SODIUM 40 MG: 40 TABLET, DELAYED RELEASE ORAL at 08:03

## 2025-03-19 RX ADMIN — ALVIMOPAN 12 MG: 12 CAPSULE ORAL at 09:03

## 2025-03-19 RX ADMIN — SODIUM CHLORIDE: 0.9 INJECTION, SOLUTION INTRAVENOUS at 10:03

## 2025-03-19 RX ADMIN — ACETAMINOPHEN 976.6 MG: 650 SOLUTION ORAL at 09:03

## 2025-03-19 RX ADMIN — FENTANYL CITRATE 100 MCG: 50 INJECTION, SOLUTION INTRAMUSCULAR; INTRAVENOUS at 10:03

## 2025-03-19 NOTE — BRIEF OP NOTE
[facility] - Surgery ([facility])  Brief Operative Note    SUMMARY     Surgery Date: 3/19/2025     Surgeons and Role:  Panel 1:     * DUNCAN Campbell MD - Primary     * Sallie Martinez MD - Resident - Chief  Panel 2:     * Chris Jones MD - Primary     * Chetan Wilder MD - Resident - Assisting        Pre-op Diagnosis:  Crohn's disease of small intestine with intestinal obstruction [K50.012]    Post-op Diagnosis:  Post-Op Diagnosis Codes:     * Crohn's disease of small intestine with intestinal obstruction [K50.012]    Procedure(s) (LRB):  EXCISION, SMALL INTESTINE, LAPAROSCOPIC, ERAS low (N/A)  CYSTOSCOPY,WITH URETERAL CATHETER INSERTION (Bilateral)  LYSIS, ADHESIONS, LAPAROSCOPIC (N/A)  BIOPSY, LIVER (N/A)  LAPAROSCOPY, DIAGNOSTIC (N/A)    Anesthesia: General    Implants:  Implant Name Type Inv. Item Serial No.  Lot No. LRB No. Used Action   MEMBRANE SEPRAFILM 5 X 6 - [id number]  MEMBRANE SEPRAFILM 5 X 6  [employer] [id number]  1 Implanted       Operative Findings: diagnostic laparoscopy converted to open. JUAN. Prior anastomosis noted to be stenosed. Excised and new anastomosis created.     Estimated Blood Loss: * No values recorded between 3/19/2025 10:15 AM and 3/19/2025  2:02 PM *    Estimated Blood Loss has been documented.         Specimens:   Specimen (24h ago, onward)       Start     Ordered    03/19/25 1309  Specimen to Pathology, Surgery Other  Once        Comments: Pre-op Diagnosis: Crohn's disease of small intestine with intestinal obstruction [K50.012]Procedure(s):EXCISION, SMALL INTESTINE, LAPAROSCOPIC, ERAS lowCYSTOSCOPY,WITH URETERAL CATHETER INSERTIONLYSIS, ADHESIONS, LAPAROSCOPIC Number of specimens: 2Name of specimens: 1. Small bowel - permanent2. Liver biopsy - permanent     References:    Click here for ordering Quick Tip   Question Answer Comment   Procedure Type: Other    Release to patient Immediate        03/19/25 1308    03/19/25 1242  Specimen to Pathology, Surgery Other   Once,   Status:  Canceled        Comments: Pre-op Diagnosis: Crohn's disease of small intestine with intestinal obstruction [K50.012]Procedure(s):EXCISION, SMALL INTESTINE, LAPAROSCOPIC, ERAS lowCYSTOSCOPY,WITH URETERAL CATHETER INSERTIONLYSIS, ADHESIONS, LAPAROSCOPIC Number of specimens: 1Name of specimens: 1. Small bowel - permanent     References:    Click here for ordering Quick Tip   Question Answer Comment   Procedure Type: Other    Release to patient Immediate        03/19/25 1241                    [id number]

## 2025-03-19 NOTE — OP NOTE
PHANI RODRIGUEZ  [id number]  [id number]    OPERATIVE NOTE:    DATE OF OPERATION: 03/19/2025    PREOPERATIVE DIAGNOSIS: History of Crohn's disease with recurrent small-bowel obstruction    POSTOPERATIVE DIAGNOSIS:  History of Crohn's disease with recurrent small-bowel obstruction    PROCEDURE PERFORMED:   Diagnostic laparoscopy  Open extensive lysis of adhesions  Small bowel resection    ATTENDING SURGEON: DUNCAN Campbell MD    RESIDENT/FELLOW SURGEON: Sallie Martinez MD    ANESTHESIA: general    ESTIMATED BLOOD LOSS: 50 mL    FINDINGS:  1. Laparoscopy performed with extensive adhesions up to the anterior abdominal wall.  These were carefully taken down.  She had multiple interloop adhesions going down into the pelvis.  These were unable to be addressed laparoscopically and therefore a lower midline incision was made.  Extensive lysis of adhesions was performed freeing all of the scar tissue.  Her prior small bowel anastomosis was evaluated and was stenotic.  Limited small bowel resection was performed resecting the anastomosis and creating a new side-to-side small bowel anastomosis.    SPECIMENS:   Small bowel    COMPLICATIONS:  None apparent    DISPOSITION: PACU    CONDITION: good    INDICATION FOR PROCEDURE:  Phani Rodriguez is a 44 y.o. female with a history of Crohn's disease who presented with multiple recurring small-bowel obstructions.  Elective diagnostic laparoscopy with possible open adhesiolysis and possible small-bowel resection were recommended.    DESCRIPTION OF PROCEDURE:   After informed consent was reviewed, the patient was taken to the operating room and transferred to the OR table.  she was placed under general anesthesia.   Ceftriaxone was given for preoperative antibiotics.  She was placed in the lithotomy position.  The arms were appropriately padded and tucked.  Time-out was performed.  Urology performed cystoscopy with bilateral ureteral catheter placement.  A Ureña catheter was then  sterilely inserted.  She was then placed back into the supine position.  The abdomen was prepped and draped in the usual sterile fashion.  A prior ileostomy site was sewn closed temporarily.  The abdomen was entered with a 5 mm incision in the left upper quadrant using a Veress needle.  Pneumoperitoneum was achieved.  A 5 mm port and camera were then inserted in the left upper quadrant.  There was no injury from the Veress needle.  There were multiple adhesions from the omentum as well as the small bowel up to the anterior abdominal wall.  The right upper quadrant appeared clear.  Two 5 mm trocars were placed on the right side.  Additional 5 mm trocar was placed on the left.  Adhesions to the anterior abdominal wall were then taken down sharply.  The umbilicus was cleared and a 5 mm trocar was placed at the umbilicus.  All of the adhesions to the anterior abdominal wall were taken down.  I then realized she had multiple interloop adhesions and it was going to be very difficult to run her bowel to evaluate for the site of obstruction.  Therefore, we made a lower midline incision through her prior scar.  The ileum leading up to the ileostomy was densely tethered down into the pelvis.  Adhesions were freed.  A large Kareem wound protector was placed.  Extensive lysis of adhesions was performed for well over 2 hours.  Once all of the adhesions were freed, her small bowel anastomosis from prior resection was evaluated.  She would have stenosis at that anastomosis.  The proximal bowel was mildly dilated.  We therefore elected to resect this anastomosis.  Windows were made in the mesentery proximal and distal.  The mesentery was divided with the ligature.  Enterotomies were made on the anti mesenteric border and a SAUMYA 75 mm stapler with a blue load was inserted and fired to form a common channel.  Staple line was inspected and found to be hemostatic.  The staple lines were then offset.  A 2nd load of the SAUMYA 75 mm stapler  was used to come across transversely to amputate the specimen and close the common enterotomy.  The transverse staple line was oversewn with 3-0 Vicryl sutures in simple fashion for hemostasis.  The anterior and posterior staple lines were oversewn with 3-0 Vicryl in Lembert fashion.  The small bowel was then run from the ligament of Treitz all the way to the ileostomy.  There was 1 loop that was tethered down into her pelvis that was left in place as it did not appear obstructing.  No serosal injuries were identified.  Seprafilm was placed over small bowel.  New instruments were then used for abdominal closure.  The fascia was cleared on either side.  The fascia was closed with a #1 looped PDS suture with interrupted #1 internal retention sutures.  The prior scar was excised.  The skin was closed with a running 4-0 Vicryl in subcuticular fashion.  All port sites were also closed with 4-0 Vicryl in subcuticular fashion.  Sterile dressings were applied.  The patient tolerated the procedure well.  There were no apparent complications.  All sponge, needle, and instruments counts were correct x 2.  she was then extubated and taken to PACU in stable condition.        DUNCAN Campbell MD, FACS, FASCRS  Staff Surgeon  Colon & Rectal Surgery

## 2025-03-19 NOTE — ANESTHESIA PROCEDURE NOTES
Intubation    Date/Time: 3/19/2025 10:29 AM    Performed by: Savannah Lees CRNA  Authorized by: Cindi Morfin MD    Intubation:     Induction:  Intravenous    Intubated:  Postinduction    Mask Ventilation:  Easy mask    Attempts:  1    Attempted By:  CRNA    Method of Intubation:  Video laryngoscopy    Blade:  Sarmiento 2    Laryngeal View Grade: Grade I - full view of cords      Difficult Airway Encountered?: No      Complications:  None    Airway Device:  Oral endotracheal tube    Airway Device Size:  7.0    Style/Cuff Inflation:  Cuffed (inflated to minimal occlusive pressure)    Inflation Amount (mL):  8    Tube secured:  20    Secured at:  The lips    Placement Verified By:  Capnometry    Complicating Factors:  None    Findings Post-Intubation:  Atraumatic/condition of teeth unchanged and BS equal bilateral

## 2025-03-19 NOTE — ANESTHESIA PREPROCEDURE EVALUATION
03/19/2025  Ivy Salgado is a 44 y.o., female.      Pre-op Assessment    I have reviewed the Patient Summary Reports.     I have reviewed the Nursing Notes.    I have reviewed the Medications.     Review of Systems  Anesthesia Hx:  No problems with previous Anesthesia   History of prior surgery of interest to airway management or planning:          Denies Family Hx of Anesthesia complications.    Denies Personal Hx of Anesthesia complications.                    Social:  Non-Smoker       Hematology/Oncology:  Hematology Normal   Oncology Normal                                   EENT/Dental:  EENT/Dental Normal           Cardiovascular:     Hypertension               Long qt                           Pulmonary:  Pulmonary Normal                       Renal/:  Chronic Renal Disease                Hepatic/GI:     GERD                Musculoskeletal:  Musculoskeletal Normal                Neurological:  Neurology Normal                                      Endocrine:  Endocrine Normal            Psych:  Psychiatric Normal                    Physical Exam  General: Well nourished and Cooperative    Airway:  Mallampati: I   Mouth Opening: Normal  TM Distance: Normal  Tongue: Normal  Neck ROM: Normal ROM    Dental:  Intact    Chest/Lungs:  Clear to auscultation, Normal Respiratory Rate    Heart:  Rate: Normal  Rhythm: Regular Rhythm  Sounds: Normal        Anesthesia Plan  Type of Anesthesia, risks & benefits discussed:    Anesthesia Type: Gen ETT  Intra-op Monitoring Plan: Standard ASA Monitors  Post Op Pain Control Plan: multimodal analgesia  Induction:  IV  Airway Plan: , Post-Induction  Informed Consent: Informed consent signed with the Patient and all parties understand the risks and agree with anesthesia plan.  All questions answered.   ASA Score: 2  Day of Surgery Review of History & Physical: H&P Update  referred to the surgeon/provider.    Ready For Surgery From Anesthesia Perspective.     .

## 2025-03-19 NOTE — ANESTHESIA POSTPROCEDURE EVALUATION
Anesthesia Post Evaluation    Patient: Ivy Salgado    Procedure(s) Performed: Procedure(s) (LRB):  EXCISION, SMALL INTESTINE, LAPAROSCOPIC, ERAS low (N/A)  CYSTOSCOPY,WITH URETERAL CATHETER INSERTION (Bilateral)  LYSIS, ADHESIONS, LAPAROSCOPIC (N/A)  BIOPSY, LIVER (N/A)  LAPAROSCOPY, DIAGNOSTIC (N/A)    Final Anesthesia Type: general      Patient location during evaluation: floor  Patient participation: Yes- Able to Participate  Level of consciousness: awake and alert  Post-procedure vital signs: reviewed and stable  Pain management: adequate  Airway patency: patent    PONV status at discharge: No PONV  Anesthetic complications: no      Cardiovascular status: blood pressure returned to baseline  Respiratory status: spontaneous ventilation and room air  Hydration status: euvolemic  Follow-up not needed.              Vitals Value Taken Time   /64 03/19/25 16:15   Temp 36.7 °C (98.1 °F) 03/19/25 15:50   Pulse 68 03/19/25 16:17   Resp 12 03/19/25 16:17   SpO2 100 % 03/19/25 16:17   Vitals shown include unfiled device data.      Event Time   Out of Recovery 14:00:00         Pain/Michael Score: Pain Rating Prior to Med Admin: 7 (3/19/2025  3:10 PM)  Michael Score: 9 (3/19/2025  2:00 PM)

## 2025-03-19 NOTE — OP NOTE
[facility]  Operative Note    Date: 03/19/2025    Pre-Op Diagnosis: Crohn's  Problem List[1]      Post-Op Diagnosis: same    Procedure(s) Performed:   1.  Cystoscopy with bilateral ureteral catheter placement    Specimen(s): none    Staff Surgeon: Chris Jones MD    Assistant Surgeon: Chetan Wilder MD    Anesthesia: General endotracheal anesthesia    Indications: Ivy Salgado is a 44 y.o. female with Crohn's.  Dr. Campbell has requested intra-operative ureteral catheters to allow for early intra-operative identification and repair of any injuries.      Findings:   Normal bladder mucosa  Bilateral catheter placed without issue    Estimated Blood Loss: min    Drains:   1.  bilateral 5 Fr ureteral catheters  2.  16 Fr huffman catheter    Procedure in Detail: Upon entering the room the patient was under general anesthesia.  The patient was then placed in the dorsal lithotomy position and prepped and draped in the usual sterile fashion. Preoperative antibiotics were administered per the primary surgeon preference.  Timeout was performed.      A 22 Fr cystoscope was inserted into the urethra and formal cystourethroscopy was performed. The urethra was normal.  The right and left ureteral orifices were in the normal anatomic position. There were no mucosal abnormalities. A 0.38 guide wire was inserted into the right  ureteral orifice and advanced to the level of the right renal pelvis. A 5 Fr ureteral catheter was then inserted over the guide wire and the wire was removed. The cystoscope was then removed leaving the ureteral catheter in place.     The cystoscope was then reinserted alongside the ureteral catheter and a 0.38 guidewire was inserted into the left ureteral orifice and advanced to the level of the left renal pelvis. A 5 Fr ureteral catheter was advanced over the wire and the wire was removed. The cystoscope was then removed.      A 16 Fr huffman catheter was inserted and the balloon was  filled with 10mL of sterile water. The ureteral catheters were secured in the standard fashion. There were no complications with the procedure and the patient tolerated our procedure well.     The case was then turned over to the primary surgeon.     Chetan Wilder MD         [1]   Patient Active Problem List  Diagnosis    Ileostomy in place    Crohn's disease of ileum with end ileostomy    Generalized anxiety disorder    Genital HSV    Small bowel obstruction    Joint pain    Hypertension    High output ileostomy    Recurrent UTI    Vitamin D deficiency    Multiple thyroid nodules    Osteopenia of multiple sites    GERD (gastroesophageal reflux disease)    Alkaline phosphatase elevation- based on lab from 4/9/2019     History of recurrent UTI (urinary tract infection)    Pelvic pain in female    Ureteral stone    History of prolonged Q-T interval on ECG    Biceps tendonitis on left    Decreased range of motion of left shoulder    Moderate episode of recurrent major depressive disorder    Kidney stone    Melanoma in situ (excised-buttocks 2018, para-stomal site 2019)    Long QT syndrome    Pancreas cyst (tail, 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

## 2025-03-19 NOTE — INTERVAL H&P NOTE
The patient has been examined and the H&P has been reviewed:    I concur with the findings and no changes have occurred since H&P was written.    Surgery risks, benefits and alternative options discussed and understood by patient/family.    Sallie Martinez MD  General Surgery, PGY-5      There are no hospital problems to display for this patient.

## 2025-03-19 NOTE — NURSING TRANSFER
Nursing Transfer Note      Reason patient is being transferred: Post procedure    Transfer To: 1026    Transfer via bed    Transported by PCT x 2    Transfer Vital Signs:See flowsheet    Order for Tele Monitor? No    Additional Lines: Oxygen and Ureña Catheter    Medicines sent: None    Any special needs or follow-up needed: Routine    Patient belongings transferred with patient:  None    Chart send with patient: Yes    Notified: Father an d Mother    Patient reassessed at: 3/19/2025 8852

## 2025-03-19 NOTE — TRANSFER OF CARE
"Anesthesia Transfer of Care Note    Patient: Ivy Salgado    Procedure(s) Performed: Procedure(s) (LRB):  EXCISION, SMALL INTESTINE, LAPAROSCOPIC, ERAS low (N/A)  CYSTOSCOPY,WITH URETERAL CATHETER INSERTION (Bilateral)  LYSIS, ADHESIONS, LAPAROSCOPIC (N/A)  BIOPSY, LIVER (N/A)  LAPAROSCOPY, DIAGNOSTIC (N/A)    Patient location: PACU    Anesthesia Type: general    Transport from OR: Transported from OR on 6-10 L/min O2 by face mask with adequate spontaneous ventilation    Post pain: adequate analgesia    Post assessment: no apparent anesthetic complications    Post vital signs: stable    Level of consciousness: sedated    Nausea/Vomiting: no nausea/vomiting    Complications: none    Transfer of care protocol was followed      Last vitals: Visit Vitals  /62 (BP Location: Left arm, Patient Position: Lying)   Pulse 73   Temp 36.4 °C (97.5 °F) (Temporal)   Resp 16   Ht 5' 1" (1.549 m)   Wt 54 kg (119 lb)   LMP 03/30/2015   SpO2 100%   Breastfeeding No   BMI 22.48 kg/m²     "

## 2025-03-20 LAB
ANION GAP SERPL CALC-SCNC: 8 MMOL/L (ref 8–16)
BASOPHILS # BLD AUTO: 0.01 K/UL (ref 0–0.2)
BASOPHILS NFR BLD: 0.1 % (ref 0–1.9)
BUN SERPL-MCNC: 8 MG/DL (ref 6–20)
CALCIUM SERPL-MCNC: 8 MG/DL (ref 8.7–10.5)
CHLORIDE SERPL-SCNC: 110 MMOL/L (ref 95–110)
CO2 SERPL-SCNC: 21 MMOL/L (ref 23–29)
CREAT SERPL-MCNC: 0.7 MG/DL (ref 0.5–1.4)
DIFFERENTIAL METHOD BLD: ABNORMAL
EOSINOPHIL # BLD AUTO: 0 K/UL (ref 0–0.5)
EOSINOPHIL NFR BLD: 0 % (ref 0–8)
ERYTHROCYTE [DISTWIDTH] IN BLOOD BY AUTOMATED COUNT: 14.8 % (ref 11.5–14.5)
EST. GFR  (NO RACE VARIABLE): >60 ML/MIN/1.73 M^2
GLUCOSE SERPL-MCNC: 100 MG/DL (ref 70–110)
HCT VFR BLD AUTO: 32.4 % (ref 37–48.5)
HGB BLD-MCNC: 10.5 G/DL (ref 12–16)
IMM GRANULOCYTES # BLD AUTO: 0.04 K/UL (ref 0–0.04)
IMM GRANULOCYTES NFR BLD AUTO: 0.4 % (ref 0–0.5)
LYMPHOCYTES # BLD AUTO: 1.7 K/UL (ref 1–4.8)
LYMPHOCYTES NFR BLD: 14.8 % (ref 18–48)
MAGNESIUM SERPL-MCNC: 1.8 MG/DL (ref 1.6–2.6)
MCH RBC QN AUTO: 27.9 PG (ref 27–31)
MCHC RBC AUTO-ENTMCNC: 32.4 G/DL (ref 32–36)
MCV RBC AUTO: 86 FL (ref 82–98)
MONOCYTES # BLD AUTO: 1.1 K/UL (ref 0.3–1)
MONOCYTES NFR BLD: 10.1 % (ref 4–15)
NEUTROPHILS # BLD AUTO: 8.5 K/UL (ref 1.8–7.7)
NEUTROPHILS NFR BLD: 74.6 % (ref 38–73)
NRBC BLD-RTO: 0 /100 WBC
PHOSPHATE SERPL-MCNC: 4.1 MG/DL (ref 2.7–4.5)
PLATELET # BLD AUTO: 253 K/UL (ref 150–450)
PMV BLD AUTO: 10 FL (ref 9.2–12.9)
POTASSIUM SERPL-SCNC: 3.5 MMOL/L (ref 3.5–5.1)
RBC # BLD AUTO: 3.76 M/UL (ref 4–5.4)
SODIUM SERPL-SCNC: 139 MMOL/L (ref 136–145)
WBC # BLD AUTO: 11.34 K/UL (ref 3.9–12.7)

## 2025-03-20 PROCEDURE — 25000003 PHARM REV CODE 250: Performed by: STUDENT IN AN ORGANIZED HEALTH CARE EDUCATION/TRAINING PROGRAM

## 2025-03-20 PROCEDURE — 25000003 PHARM REV CODE 250: Performed by: COMMUNITY HEALTH WORKER

## 2025-03-20 PROCEDURE — 85025 COMPLETE CBC W/AUTO DIFF WBC: CPT | Performed by: STUDENT IN AN ORGANIZED HEALTH CARE EDUCATION/TRAINING PROGRAM

## 2025-03-20 PROCEDURE — 25000003 PHARM REV CODE 250: Performed by: NURSE PRACTITIONER

## 2025-03-20 PROCEDURE — 84100 ASSAY OF PHOSPHORUS: CPT | Performed by: STUDENT IN AN ORGANIZED HEALTH CARE EDUCATION/TRAINING PROGRAM

## 2025-03-20 PROCEDURE — 63600175 PHARM REV CODE 636 W HCPCS: Performed by: STUDENT IN AN ORGANIZED HEALTH CARE EDUCATION/TRAINING PROGRAM

## 2025-03-20 PROCEDURE — 83735 ASSAY OF MAGNESIUM: CPT | Performed by: STUDENT IN AN ORGANIZED HEALTH CARE EDUCATION/TRAINING PROGRAM

## 2025-03-20 PROCEDURE — 97165 OT EVAL LOW COMPLEX 30 MIN: CPT

## 2025-03-20 PROCEDURE — 20600001 HC STEP DOWN PRIVATE ROOM

## 2025-03-20 PROCEDURE — 80048 BASIC METABOLIC PNL TOTAL CA: CPT | Performed by: STUDENT IN AN ORGANIZED HEALTH CARE EDUCATION/TRAINING PROGRAM

## 2025-03-20 PROCEDURE — 97535 SELF CARE MNGMENT TRAINING: CPT

## 2025-03-20 PROCEDURE — 99232 SBSQ HOSP IP/OBS MODERATE 35: CPT | Mod: ,,, | Performed by: INTERNAL MEDICINE

## 2025-03-20 RX ORDER — POTASSIUM CHLORIDE 20 MEQ/1
40 TABLET, EXTENDED RELEASE ORAL ONCE
Status: COMPLETED | OUTPATIENT
Start: 2025-03-20 | End: 2025-03-20

## 2025-03-20 RX ORDER — NADOLOL 40 MG/1
40 TABLET ORAL NIGHTLY
Status: DISCONTINUED | OUTPATIENT
Start: 2025-03-20 | End: 2025-03-21 | Stop reason: HOSPADM

## 2025-03-20 RX ADMIN — OXYCODONE HYDROCHLORIDE 10 MG: 10 TABLET ORAL at 10:03

## 2025-03-20 RX ADMIN — ACETAMINOPHEN 1000 MG: 500 TABLET ORAL at 05:03

## 2025-03-20 RX ADMIN — POTASSIUM CHLORIDE 40 MEQ: 1500 TABLET, EXTENDED RELEASE ORAL at 12:03

## 2025-03-20 RX ADMIN — GABAPENTIN 300 MG: 300 CAPSULE ORAL at 09:03

## 2025-03-20 RX ADMIN — MUPIROCIN: 20 OINTMENT TOPICAL at 08:03

## 2025-03-20 RX ADMIN — PANTOPRAZOLE SODIUM 40 MG: 40 TABLET, DELAYED RELEASE ORAL at 09:03

## 2025-03-20 RX ADMIN — NADOLOL 40 MG: 40 TABLET ORAL at 09:03

## 2025-03-20 RX ADMIN — IBUPROFEN 800 MG: 400 TABLET ORAL at 09:03

## 2025-03-20 RX ADMIN — GABAPENTIN 300 MG: 300 CAPSULE ORAL at 08:03

## 2025-03-20 RX ADMIN — IBUPROFEN 800 MG: 800 INJECTION INTRAVENOUS at 04:03

## 2025-03-20 RX ADMIN — ACETAMINOPHEN 1000 MG: 10 INJECTION, SOLUTION INTRAVENOUS at 08:03

## 2025-03-20 RX ADMIN — MUPIROCIN: 20 OINTMENT TOPICAL at 09:03

## 2025-03-20 RX ADMIN — ACETAMINOPHEN 1000 MG: 10 INJECTION, SOLUTION INTRAVENOUS at 12:03

## 2025-03-20 RX ADMIN — MIRTAZAPINE 30 MG: 15 TABLET, ORALLY DISINTEGRATING ORAL at 09:03

## 2025-03-20 RX ADMIN — PROCHLORPERAZINE EDISYLATE 5 MG: 5 INJECTION INTRAMUSCULAR; INTRAVENOUS at 02:03

## 2025-03-20 RX ADMIN — CLONAZEPAM 0.5 MG: 0.5 TABLET ORAL at 08:03

## 2025-03-20 RX ADMIN — ENOXAPARIN SODIUM 40 MG: 40 INJECTION SUBCUTANEOUS at 05:03

## 2025-03-20 RX ADMIN — OXYCODONE HYDROCHLORIDE 10 MG: 10 TABLET ORAL at 06:03

## 2025-03-20 RX ADMIN — ACETAMINOPHEN 1000 MG: 500 TABLET ORAL at 09:03

## 2025-03-20 RX ADMIN — PANTOPRAZOLE SODIUM 40 MG: 40 TABLET, DELAYED RELEASE ORAL at 08:03

## 2025-03-20 RX ADMIN — OXYCODONE HYDROCHLORIDE 10 MG: 10 TABLET ORAL at 02:03

## 2025-03-20 RX ADMIN — GABAPENTIN 300 MG: 300 CAPSULE ORAL at 02:03

## 2025-03-20 RX ADMIN — IBUPROFEN 800 MG: 800 INJECTION INTRAVENOUS at 12:03

## 2025-03-20 RX ADMIN — CLONAZEPAM 0.5 MG: 0.5 TABLET ORAL at 09:03

## 2025-03-20 NOTE — SUBJECTIVE & OBJECTIVE
Subjective:     Interval History: NAOE. POD1 from small bowel resection. Had some pain control issues yesterday due to timing of medications, improved today. Having ostomy output. Afebrile, VSS. Good urine output. Tolerating clears without n/v.     Post-Op Info:  Procedure(s) (LRB):  EXCISION, SMALL INTESTINE, LAPAROSCOPIC, ERAS low (N/A)  CYSTOSCOPY,WITH URETERAL CATHETER INSERTION (Bilateral)  LYSIS, ADHESIONS, LAPAROSCOPIC (N/A)  BIOPSY, LIVER (N/A)  LAPAROSCOPY, DIAGNOSTIC (N/A)   1 Day Post-Op      Medications:  Continuous Infusions:   0.9% NaCl   Intravenous Continuous 40 mL/hr at 03/20/25 0630 Rate Verify at 03/20/25 0630     Scheduled Meds:   acetaminophen  1,000 mg Intravenous Q8H    Followed by    acetaminophen  1,000 mg Oral Q8H    clonazePAM  0.5 mg Oral BID    enoxparin  40 mg Subcutaneous Daily    gabapentin  300 mg Oral TID    ibuprofen  800 mg Intravenous Q8H    Followed by    ibuprofen  800 mg Oral Q8H    mirtazapine  30 mg Oral Nightly    mupirocin   Nasal BID    pantoprazole  40 mg Oral BID     PRN Meds:   acetaminophen 1,000 mg/100 mL (10 mg/mL) injection 1,000 mg    Followed by    acetaminophen tablet 1,000 mg    clonazePAM tablet 0.5 mg    enoxaparin injection 40 mg    gabapentin capsule 300 mg    ibuprofen 800 mg in dextrose 5 % 250 mL (Vial-Mate)    Followed by    ibuprofen tablet 800 mg    mirtazapine disintegrating tablet 30 mg    mupirocin 2 % ointment    pantoprazole EC tablet 40 mg        Objective:     Vital Signs (Most Recent):  Temp: 98 °F (36.7 °C) (03/20/25 0753)  Pulse: 74 (03/20/25 0753)  Resp: 18 (03/20/25 0753)  BP: 118/75 (03/20/25 0753)  SpO2: 98 % (03/20/25 0753) Vital Signs (24h Range):  Temp:  [97.5 °F (36.4 °C)-98.1 °F (36.7 °C)] 98 °F (36.7 °C)  Pulse:  [56-75] 74  Resp:  [10-21] 18  SpO2:  [95 %-100 %] 98 %  BP: ()/(52-84) 118/75     Intake/Output - Last 3 Shifts         03/18 0700  03/19 0659 03/19 0700 03/20 0659 03/20 0700 03/21 0659    P.O.  150     I.V.  (mL/kg)  1543.9 (28.2)     IV Piggyback  1300     Total Intake(mL/kg)  2993.9 (54.6)     Urine (mL/kg/hr)  1445     Stool  35     Total Output  1480     Net  +1513.9                     Physical Exam  Constitutional:       General: She is not in acute distress.  HENT:      Head: Normocephalic and atraumatic.   Eyes:      Conjunctiva/sclera: Conjunctivae normal.   Cardiovascular:      Rate and Rhythm: Normal rate and regular rhythm.   Pulmonary:      Effort: Pulmonary effort is normal. No respiratory distress.   Abdominal:      General: There is no distension.      Palpations: Abdomen is soft.      Comments: Surgical incision c/d/I, minimal serosanguinous drainage  Stoma w/ pink, viable mucosa. Dark bilious output in appliance  Appropriately tender to palpation   Musculoskeletal:         General: No swelling.   Skin:     General: Skin is warm and dry.      Capillary Refill: Capillary refill takes less than 2 seconds.   Neurological:      General: No focal deficit present.      Mental Status: She is alert.          Significant Labs:  BMP (Last 3 Results):   Recent Labs   Lab 03/20/25  0422         K 3.5      CO2 21*   BUN 8   CREATININE 0.7   CALCIUM 8.0*   MG 1.8     CBC (Last 3 Results):   Recent Labs   Lab 03/20/25  0422   WBC 11.34   RBC 3.76*   HGB 10.5*   HCT 32.4*      MCV 86   MCH 27.9   MCHC 32.4       Significant Diagnostics:  I have reviewed all pertinent imaging results/findings within the past 24 hours.

## 2025-03-20 NOTE — CONSULTS
[facility]  Gastroenterology Inflammatory Bowel Disease Inpatient Service   Consult Note    Patient Name: Ivy Salgado  MRN: [id number]  Admission Date: 3/19/2025  Hospital Length of Stay: 1 days  Code Status: Full Code   Attending Provider: DUNCAN Campbell MD   Consulting Provider: Shelby Post MD  Primary Care Physician: Luis Madden DO  Principal Problem:Crohn's disease of small intestine with other complication  Consults  Subjective:     HPI: Ivy Salgado is a 44 y.o. female with hx of Crohn's s/p completion proctocolectomy with end ileostomy in 6/2012 with recurrent SBO. Now s/p dx lap converted to open with JUAN and resection of prior anastomosis on 3/19.     Doing well this am.     IBD History:   Ivy Salgado is a 44 y.o. female with Crohn's disease with end ileostomy and recurrent ileitis, recurrent C diff (2014 x 2), ARPITA/depression, arthritis, h/o abnormal pap smear- LYLA I, nephrolithiasis-nonobstructive, GERD, long QTc syndrome (Type III, on nadolol), s/p SHELLIE (2015) for recurrent ovarian cysts and endometriosis, early evolving melanoma in situ (buttocks and para-stomal site, excised in 2018 and 2019 respectively), history of genital HSV, imuran induced pancreatitis, pancreatic tail cyst, recurrent UTI (h/o ESBL 2018), multiple thyroid nodules, osteopenia, left femoral head chronic avascular necrosis, history bacterial vaginosis, h/o abnormal LFTs (elevated ALP since 2016), family history of colon cancer.  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  She tried multiple meds including imuran (pancreatitis, remicade (secondary nonresponder), humira (DIL), MTX (leukopenia).  She met Dr. Cameron initially in GI clinic at [facility] 5/2009 at which time she had some diarrhea and on pred 30 mg/d. Colonoscopy 6/2009 significant for  diffuse proctosigmoiditis with remainder of colon normal and end to end ileocolonic anastomsis with inflammation. Placed on rowasa enemas but too expensive so switched to steroid enemas.  Flex sig 10/2009 ongoing proctosigmoiditis.  In 2010 she had rectal bleeding that improved with proctofoam and started cimzia with improvement of symptoms.  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  Colonoscopy c/w moderate proctosigmoiditis and in 5/2010 she continued proctofoam BID and took extra dose of cimzia to induce remission.  In 7/2010 flex sig confirmed active left sided colitis and she started prednisone in 3/2011 with repeat colonoscopy 8/2011 c/w ongoing rectosigmoid inflammation with mild mucosal ulcer the transverse colon with ileocolonic anastomotic stricture and normal TI. In 9/2011 pt was on canasa, cimzia and prednisone taper.  In April/May 2012 pt hospitalized and CT c/w sigmoid wall thickening and colonoscopy confirmed distal 30 cm with moderate inflammation with ileocolonic anastomotic ulcers and normal TI.  In 6/2012 Dr Parsons proceeded with completion proctocolectomy with end ileostomy with pathology confirming transmural active inflammation with abscess c/w Crohn's disease.  Post-operatively she had peristomal ulcer and perineal wounds. In 1/2013 patient had non-healing perineal wound S/P debridement  with steroids and treated with cipro. In 8/2013 she had EUA with debridement and fulguration of non-healing perineal wound.  In 8/2013 pt had epigastric abd pain and EGD c/w benign gastric polyp, labs normal, CT c/w short segment WB thickening involving ileum proximal to the stoma and resolution of right adnexal mass.  In 9/2013 ileoscopy through stoma significant for segment mucosa at 5 cm proximal to stoma mild congested, eroded, ulcerated  area of 6-30 cm and mucosal distal is normal. CT A/p 1/2024 c/w 2 separate segments of SB proximal to the ostomy and near staple row in RLQ with mild enhancement and wall thickening c/w mild inflammatory changes and segment of bowel forming ostomy.  SB enteroscopy 2/2014 significant for single 2 mm ulcer in the proximal ileum.  In 2014 she had 2 hospitalizations for high ileostomy output with was treated with prednisone with taper.  In 10/2014 CT showed few mild dilated loops SB in anterior right pelvis and loop with surgical suture line abuts anterior pelvic wall and possible adhesion. In 10/2014 ileoscopy through stomal normal.  In 1/2015 pt had focal segment of mild distal SB dilation and C diff positive and pt treated antibiotics followed by probiotics and resumed cimzia. Dr. Cameron then referred patient to Dr. Parish Ross in 2015 and he mentions recurrent C diff, recurrent SBOs likely related to adhesions. In 2015 she had SHELLIE/BSO with bladder repair and due to this missed one dose of cimzia and then resumed 5/2015. In 1/2016 she had partial SBO due to adhesions and UGI/SBFT and CT did not show fixed obstruction. In 10/2016 she reported to Dr. Ross postprandial nausea and epigastric pain, weight loss, fatigue, low grade fever and watery stool output and prednisone helped symptoms but not as good response as past. She was off of cimzia 8-9/2016 though sx occurred prior to holding cimzia. Ileoscopy through stoma 10/2016 significant for normal 15 cm of ileum examined. Overall Dr. Ross felt that her symptoms responded well to prednisone and restarting cimzia. She presented to her OV 1/2017 with increased ileostomy output with C diff negative with symptoms ongoing and MRE 5/2017 c/w long segment of diffuse wall thickening and mucosal enhancement involving the distal ileum. CT A/P 6/2017 significant for left ovarian cyst, mild biliary dilation stable, hypodensities and punctate bilateral renal calcifications.   Ileoscopy through stoma 8/2017 c/w normal ileum from stoma to 15-20 cm. CT A/P 11/2017 significant for righ adnexal mass abutting theright external iliac vein and pelvic US recommended, bilateral renal hypodensities and calcifications. MRE 11/2017 c/w mild questionable ileal inflammation and luminal narrowing involving short segment of SB within the right hemipelvis with mild dilation and upstream bowel 2.5 cm with findings concerning for developing stricture. She stopped cimzia 6/2017 and started stelara 7/11/2017 though discontinued in 1/2018 due to rash.  Due to symptoms pt was started in 1/2018 on prednisone 10 mg/d, bentyl. In 2/2018 CT A/P c/w bilateral nonobstructing renal calculi, mild bilateral cortical scarring of lower renal poles. MRE 9/2018 significant for short segment of distal ileum with scattered regions of non uniform wall thickening and possible additional short segment of proximal jejunum with mild wall thickening. In fall 2018 she was placed on entocort though due to fast transit was opening capsule and taking this. CT A/P 12/2018 c/w several bilateral renal hypodensities c/w cysts, nonobstructive bilateral nephrolithiasis, right adnexal complex cyst. She started entyvio 11/20/18 and due to recurrent UTIs and palpitations (dx QT prolongation and started on beta blocker) so discontinued in 10/27/20. MRE 5/15/19 significant for right adnexal cystic lesion, large left adnexal cystic lesion.  On 5/30/19 patient had exploratory laparotomy with lysis of adhesions, BSO/mass resection. CT A/P 6/2019 significant for nonspecific rim enhancing fluid collection, mild left hydroureteronephrosis. In 2019 there were several mos she held entyvio due to gyn and melanoma surgery. MRE 5/2020 significant for small non enhancing fluid collection within presacral region superior to the vaginal cuff, right sided pelvocaliectasis, bilateral cortical renal cysts, left femoral head chronic avascular necrosis. CT A/P  9/2020 c/w mild left hydrouriteronephrosis due to distal left ureter stone with urothelial thickening and enhancement.  MRE 4/2021 right ovarian cyst likely hemorrhagic cyst.  In 1/2022 patient was placed by Dr. Ross on pantoprazole 40 mg BID. For joint pains and chronic abdominal pain Dr. Ross treated her with gabapentin for several years. In 1/2023 she had multiple intrarenal stones.  Repeat CT 10/2023 small non-obstructing renal stones. She was hospitalized 12/31/23-1/7/24 for high ileostomy output with stool studies neg for infection.  Elevated inflammatory markers (ESR 94, CRP 22.8), stool caprotectin 281. CT A/P showed slow flow through few mid to distal SB loops noting venous vascular engorgement about these loops and wall hyperemia. On 1/3/24 she had EGD c/w mild HP neg gastritis and ileoscopy through stoma with about 6 apthous ulcers in the distal 20 cm of the ileum and biopsies c/w active inflammation.  Pt had elevated LFTs (peaked 1/4/24 ///TB normal).  Hepatology consulted and serological workup negative and subsequent LFTs normalized with no intervention. Abdominal US revealed mildly dilated bile ducts in the central right hepatic lobe and MRCP c/w no evidence of biliary obstruction, punctate cystic focus in the pancreas tail likely side IPMN and f/u in 1 year recommended.  She was treated with antidiarrheals (imodium helped but lomotil caused palpitations) and IVFs. Lotronex was being considered but due to prolonged QT unable to proceed with this. Due to mild ileitis plan for outpatient entyvio.  Since her discharge from hospital she had continued high ileostomy output and dehydration and we recommended that she return for hospitalization but pt did not wish to go to ER.  She continued with high ileostomy output up to 8 liters/day but if fasting down to 4 liters/day despiate taking imodium 1 tab QID.  She is not taking lomotil due to palpitations.  At her OV 1/16/24 due to high  ileostomy output and dehydration I proceeded with hospital admission 1/16/24-1/27/24 at which time she continued on IVFs, antidiarrheals, pain meds and started on IV solumedrol with repeat stool calprotectin done on 1/16/24 93.9 though unclear accuracy given some granulation tissue on stoma could influence this test. On 1/24/24 stool calprotectin 117.6, ileoscopy through stoma with one small apthous ulcer 19 cm proximal to stoma. She had stool studies neg for infection and then discharged home on liquid imodium, pred 40 mg with taper and plan to start entyvio. She restarted entyvio with the re induction doses on 1/30/2024 then resumed every 8 weeks infusions and by her office visit in 3/2024 patient no longer needed IVFs and was recovering well from the hospital stay. She reported recurrent UTIs with entyvio in the past and was advised to closely monitor her symptoms and since then has had recurrent UTI but overall I fel this was highly unusual given MOA of entyvio.  In 4/2024 pt hospitalized for abd pain and decreased ileostomy output and CT A/P significant for SBO with transition point seen in the midline upper pelvis. The small bowel anastomosis and also ileostomy site are patent and do not appear to be source for suspected obstruction. This was managed conservatively and followed by MRE 5/2024 significant for short segmented stricture with mild active inflammation adjacent to ehr RLQ surgical anastomosis with tethering and distortion. Ileoscopy through stoma 6/4/24 significant of neoterminal ileum immediately proximal to the stoma normal.     IBD Details:  - current IBD meds:  Entyvio 300 mg IV q 8 weeks (11/20/18- 10/27/20- stopped due to UTIs and palpitations, reinduced 1/30/24)  - other medications: liquid imodium PRN; phenergan PRN for nausea ~2x/week  - ileostomy output: 3-4 full bags, variable consistency liquid to soft; no abdominal pain  - 10/2024- hospitalization for SBO with CT A/P significant for  multiple fluid-air filled loops of dilated small bowel in the right hemiabdomen and pelvis with suspected narrowing at a surgical anastomosis.  No definite transition point elsewhere.  Findings may represent small bowel obstruction.  A 4 cm segment of the right lower quadrant small bowel demonstrates findings suspicious for active segmental enteritis and/or small bowel stricture. This was managed conservatively with resolution.  - 11/7/24 MRE:  Several small renal cysts noted.  RLQ chance-anastomotic SB with thickening, transmural hyperenhancement with minimal restricted diffusion involving 4.3 cm of bowel, outpouching at site of SB anastomosis.  - 12/2/24 ileoscopy through stoma: There was evidence of a patent end ileostomy found in the terminal ileum. This was characterized by healthy appearing mucosa. he harvey-terminal ileum appeared normal. Biopsies ileum normal  - 12/4/24 MRI abdomen: Stable punctate cystic focus in the pancreatic tail with probable communication with the main pancreatic duct likely representing a side branch IPMN. Too small to characterize without suspicious features. Continued follow-up in 1 year with MRI MRCP with and without contrast  - 12/11/24 cystoscopy:  Normal urethra, no visible fistulae within bladder, 3 small saccules on left lateral wall, presumably from previous colovesical fistula repair with scar tissue, cystogram with normal contour, no contrast extravasation nor fistula present.   - NSAID use: No  - Narcotic use: No  - Alternative/complementary meds for IBD: No    Prior pertinent Surgeries:   surgery with SB and colon resection due to entero-vesicular fistula with abdominal abscess in 1992, 1998 6/2012 (Dr Parsons):  completion proctocolectomy with end ileostomy with pathology confirming transmural active inflammation with abscess c/w Crohn's disease  1/2013 EUA:  debridement of non-healing perineal wound  8/2013 EUA: debridement and fulguration of non-healing perineal wound.       Prior pertinent Endoscopy/Imagin23 CT A/P slow flow through few mid to distal SB loops noting venous vascular engorgement about these loops and wall hyperemia  1/3/24 EGD: normal except for gastric erythema, bx c/w mild HP neg gastritis   1/3/24 ileoscopy through stoma:  6 apthous ulcers in the distal 20 cm of the ileum and biopsies c/w active inflammation  1/3/24 abd US:  mildly dilated bile ducts in the central right hepatic lobe   24 MRCP c/w no evidence of biliary obstruction, punctate cystic focus in the pancreas tail likely side IPMN  24 MRE: short-segmented stricture with signs of mild active inflammation adjacent to the RLQ surgical anastomosis with tethering and distortion makes an underlying fistula difficult to exclude.   24 Ileoscopy: Patent end ileostomy with healthy appearing mucosa in the terminal ileum. The examined portion of the ileum was normal. Biopsies normal  10/2024:  CT A/P significant for multiple fluid-air filled loops of dilated small bowel in the right hemiabdomen and pelvis with suspected narrowing at a surgical anastomosis.  No definite transition point elsewhere.  Findings may represent small bowel obstruction.  A 4 cm segment of the right lower quadrant small bowel demonstrates findings suspicious for active segmental enteritis and/or small bowel stricture. This was managed conservatively with resolution.  24 MRE:  Several small renal cysts noted.  RLQ chance-anastomotic SB with thickening, transmural hyperenhancement with minimal restricted diffusion involving 4.3 cm of bowel, outpouching at site of SB anastomosis.  24 MRI abdomen: Stable punctate cystic focus in the pancreatic tail with probable communication with the main pancreatic duct likely representing a side branch IPMN. Too small to characterize without suspicious features. Continued follow-up in 1 year with MRI MRCP with and without contrast  24 cystoscopy:  Normal urethra, no visible  fistulae within bladder, 3 small saccules on left lateral wall, presumably from previous colovesical fistula repair with scar tissue, cystogram with normal contour, no contrast extravasation nor fistula present.       Prior IBD Therapies:  Proctofoam - partially effective  imuran (pancreatitis)  MTX (leukopenia)  Remicade- secondary nonresponder  Humira- discontinued due to drug induced lupus due to joint pains  Entocort- fall 2018, broke open capsule when taking- not sure if effective   Cimzia [id number]- secondary nonresponder  Stelara (7/11/17-1/2018)- discontinued due to rash  Canasa ineffective   Prednisone- effective- last took 1-2/2024    Therapeutic Drug Monitoring:  None    Past Medical History:   Diagnosis Date    Abnormal Pap smear 2007    Alkaline phosphatase elevation- based on lab from 4/9/2019 05/28/2019    Arthritis     Avascular necrosis of bone of hip, left     Bacterial vaginosis     Crohn disease     Depression 08/05/2017    Drug-induced pancreatitis (imuran)     Encounter for blood transfusion     Generalized anxiety disorder     Genital HSV     GERD (gastroesophageal reflux disease)     Hypertension     Ileostomy in place 07/09/2012    Kidney stone     Long QT syndrome     Melanoma in situ (excised-buttocks 2018, para-stomal site 2019)     Moderate episode of recurrent major depressive disorder 02/02/2024    Multiple thyroid nodules 11/27/2018    Osteopenia of multiple sites 01/07/2019    Pancreas cyst (tail, 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)     Recurrent Clostridioides difficile diarrhea     Recurrent UTI 04/03/2013     Past Surgical History:   Procedure Laterality Date    ABDOMINAL SURGERY      APPENDECTOMY      ARTHROPLASTY OF SHOULDER Left 7/18/2023    Procedure: ARTHROPLASTY, SHOULDER;  Surgeon: Sharon Babb MD;  Location: [facility];  Service: Orthopedics;  Laterality: Left;    AUGMENTATION OF BREAST Bilateral 06/2022    gel implants    BILATERAL SALPINGO-OOPHORECTOMY (BSO) Bilateral 05/30/2019    Procedure:  SALPINGO-OOPHORECTOMY, BILATERAL;  Surgeon: Rupa German MD;  Location: [facility] OR 2ND FLR;  Service: OB/GYN;  Laterality: Bilateral;    BLADDER SURGERY      partial cystectomy due to fistula    breast lift      BREAST SURGERY      [facility]      COLON SURGERY      COLONOSCOPY      CYSTOGRAM  12/11/2024    Procedure: CYSTOGRAM;  Surgeon: Lexi Villalba MD;  Location: [facility] OR;  Service: Urology;;    CYSTOSCOPY  09/23/2020    Procedure: CYSTOSCOPY;  Surgeon: Sascha Florentino MD;  Location: [facility] OR 1ST FLR;  Service: Urology;;    CYSTOSCOPY N/A 12/11/2024    Procedure: CYSTOSCOPY;  Surgeon: Lexi Villalba MD;  Location: [facility] OR;  Service: Urology;  Laterality: N/A;    CYSTOSCOPY W/ URETERAL STENT PLACEMENT Left 09/15/2020    Procedure: CYSTOSCOPY, WITH URETERAL STENT INSERTION;  Surgeon: Sascha Florentino MD;  Location: [facility] OR 1ST FLR;  Service: Urology;  Laterality: Left;    CYSTOSCOPY,WITH URETERAL CATHETER INSERTION Bilateral 3/19/2025    Procedure: CYSTOSCOPY,WITH URETERAL CATHETER INSERTION;  Surgeon: Chris Jones MD;  Location: [facility] OR 2ND FLR;  Service: Urology;  Laterality: Bilateral;    DIAGNOSTIC LAPAROSCOPY N/A 07/09/2020    Procedure: LAPAROSCOPY, DIAGNOSTIC;  Surgeon: Gilson El MD;  Location: [facility] OR;  Service: OB/GYN;  Laterality: N/A;    DIAGNOSTIC LAPAROSCOPY N/A 3/19/2025    Procedure: LAPAROSCOPY, DIAGNOSTIC;  Surgeon: DUNCAN Campbell MD;  Location: [facility] OR 2ND FLR;  Service: Colon and Rectal;  Laterality: N/A;    ESOPHAGOGASTRODUODENOSCOPY N/A 1/3/2024    Procedure: EGD (ESOPHAGOGASTRODUODENOSCOPY);  Surgeon: Lily Lind MD;  Location: [facility] ENDO (2ND FLR);  Service: Endoscopy;  Laterality: N/A;    EXCISION OF MELANOMA  07/17/2019    ILEOSCOPY N/A 1/3/2024    Procedure: ILEOSCOPY;  Surgeon: Lily Lind MD;  Location: [facility] ENDO (2ND FLR);  Service: Endoscopy;  Laterality: N/A;    ILEOSCOPY N/A 1/24/2024    Procedure: ILEOSCOPY;  Surgeon: Lilian Navarro MD;   Location: [facility] ENDO (2ND FLR);  Service: Endoscopy;  Laterality: N/A;  through stoma    ILEOSCOPY N/A 6/4/2024    Procedure: ILEOSCOPY;  Surgeon: Peace Garcia MD;  Location: [facility] ENDO (4TH FLR);  Service: Endoscopy;  Laterality: N/A;  Ref By:,instr sent via portal,  received urgent message to reschedule pt from 7/15  to may or june-updated prep instructions sent-   5/29/24- precall complete - ERW    ILEOSCOPY N/A 12/2/2024    Procedure: ILEOSCOPY;  Surgeon: Peace Garcia MD;  Location: [facility] ENDO (4TH FLR);  Service: Endoscopy;  Laterality: N/A;  Ref By:,[email or web address]  11/22 lvm for precall-st  11/27/24 attempted precall no answer LVM MARI  11/29-LVM for pre call.  No answe.-/ES  11/29-pt lvm confirming appt-KPvt    ILEOSTOMY      INSERTION OF TUNNELED CENTRAL VENOUS CATHETER (CVC) WITH SUBCUTANEOUS PORT Right 9/10/2024    Procedure: INSERTION, SINGLE LUMEN CATHETER WITH PORT, WITH FLUOROSCOPIC GUIDANCE, Rt neck or chest, prefers chest;  Surgeon: Vinh Lloyd MD;  [facility];  Service: General;  Laterality: Right;    LAPAROSCOPIC LYSIS OF ADHESIONS N/A 07/09/2020    Procedure: LYSIS, ADHESIONS, LAPAROSCOPIC;  Surgeon: Gilson El MD;  Location: [facility] OR;  Service: OB/GYN;  Laterality: N/A;    LAPAROSCOPIC LYSIS OF ADHESIONS N/A 3/19/2025    Procedure: LYSIS, ADHESIONS, LAPAROSCOPIC;  Surgeon: DUNCAN Campbell MD;  [facility];  Service: Colon and Rectal;  Laterality: N/A;    LAPAROSCOPIC RESECTION OF SMALL INTESTINE N/A 3/19/2025    Procedure: EXCISION, SMALL INTESTINE, LAPAROSCOPIC, ERAS low;  Surgeon: DUNCAN Campbell MD;  Location: [facility] OR 2ND FLR;  Service: Colon and Rectal;  Laterality: N/A;    LASER LITHOTRIPSY  09/23/2020    Procedure: LITHOTRIPSY, USING LASER;  Surgeon: Sascha Florentino MD;  Location: [facility] OR [address] [place] [address];  Service: Urology;;    LIVER BIOPSY N/A 3/19/2025    Procedure: BIOPSY, LIVER;  Surgeon: DUNCAN Campbell  MD Silvio;  Location: [facility] OR 2ND FLR;  Service: Colon and Rectal;  Laterality: N/A;    LYSIS OF ADHESIONS N/A 05/30/2019    Procedure: LYSIS, ADHESIONS;  Surgeon: Rupa German MD;  Location: [facility] OR 2ND FLR;  Service: OB/GYN;  Laterality: N/A;    MEDIPORT REMOVAL Left 9/10/2024    Procedure: REMOVAL, CATHETER, CENTRAL VENOUS, TUNNELED, WITH PORT, Left side;  Surgeon: Vinh Lloyd MD;  Location: [facility] OR 2ND FLR;  Service: General;  Laterality: Left;    OOPHORECTOMY Right 04/16/2015    PORTACATH PLACEMENT  02/21/2017    SKIN BIOPSY      SMALL INTESTINE SURGERY      age 18 Y    TOTAL ABDOMINAL HYSTERECTOMY  04/16/2015    TOTAL COLECTOMY      TUBAL LIGATION  06/06/2012    UPPER GASTROINTESTINAL ENDOSCOPY      URETEROSCOPIC REMOVAL OF URETERIC CALCULUS  09/23/2020    Procedure: REMOVAL, CALCULUS, URETER, URETEROSCOPIC;  Surgeon: Sascha Florentino MD;  [facility];  Service: Urology;;    URETEROSCOPY Left 09/23/2020    Procedure: URETEROSCOPY;  Surgeon: Sascha Florentino MD;  [facility];  Service: Urology;  Laterality: Left;     Family History   Problem Relation Name Age of Onset    Diabetes Paternal Grandfather Stephen     Hearing loss Paternal Grandmother Viviane     Cancer Maternal Grandfather Philippe         Skin    Skin cancer Maternal Grandfather Philippe     Diabetes Maternal Grandfather Philippe     Heart disease Maternal Grandfather Philippe     Colon cancer Father Milan     Cancer Father Milan         Colon    Liver cancer Father Milan     Hyperlipidemia Father Milan     Hypertension Mother Shaila     Crohn's disease Brother      Endometrial cancer Maternal Aunt      Breast cancer Maternal Cousin  41    Crohn's disease Daughter      Ovarian cancer Neg Hx      Melanoma Neg Hx       Social History[1]  Scheduled Meds:   acetaminophen  1,000 mg Oral Q8H    clonazePAM  0.5 mg Oral BID    enoxparin  40 mg Subcutaneous Daily    gabapentin  300 mg Oral TID    ibuprofen  800  "mg Oral Q8H    mirtazapine  30 mg Oral Nightly    mupirocin   Nasal BID    nadoloL  40 mg Oral QHS    pantoprazole  40 mg Oral BID     Continuous Infusions:  PRN Meds:.  Current Facility-Administered Medications:     oxyCODONE, 5 mg, Oral, Q4H PRN    oxyCODONE, 10 mg, Oral, Q4H PRN    prochlorperazine, 5 mg, Intravenous, Q6H PRN    sodium chloride 0.9%, 10 mL, Intra-Catheter, PRN  Review of patient's allergies indicates:   Allergen Reactions    Azathioprine sodium Other (See Comments)     Other reaction(s): pancreatitis  Other reaction(s): pancreatitis    Methotrexate analogues Other (See Comments)     leukopenia    Stelara [ustekinumab] Other (See Comments)     Multiple infections    Zofran [ondansetron hcl (pf)] Other (See Comments)     Per patient causes prolong QT    Vancomycin analogues Other (See Comments)     Made her red    Azathioprine      Other reaction(s): Unknown    Methotrexate      Other reaction(s): infection-    Morphine Itching and Other (See Comments)     Other reaction(s): Itching    Zofran [ondansetron hcl]      Other reaction(s): Hives    Bactrim [sulfamethoxazole-trimethoprim] Palpitations    Ciprofloxacin Palpitations     ROS - negative except for otherwise stated in HPI      Objective:   /77 (BP Location: Left arm, Patient Position: Lying)   Pulse 83   Temp 98.2 °F (36.8 °C) (Oral)   Resp 18   Ht 5' 1" (1.549 m)   Wt 54.8 kg (120 lb 13 oz)   LMP 03/30/2015   SpO2 (!) 93%   Breastfeeding No   BMI 22.83 kg/m²     Physical Exam:  Constitutional:  not in acute distress and well developed  HENT: Head: Normal, normocephalic.  Eyes: conjunctiva clear and sclera nonicteric  Skin: normal color on visualized skin  Neurological: alert, oriented     Assessment/Plan:   Ivy Salgado is a 44 y.o. female with Crohn's s/p completion proctocolectomy with end ileostomy in 6/2012 with recurrent SBO. Now s/p dx lap converted to open with JUAN and resection of prior anastomosis on 3/19. "     Doing well this am.  Of note from surgical procedure, anastomotic stricture (SB SB) 40 cm proximal the stoma, 10 cm resected, no active disease noted to have a ton of adhesions.    Problem List:  Crohn's disease with end ileostomy, recurrent ileitis and recurrent SBO   S/p DX lap converted to open with JUAN and resection of prior anastomosis on 03/19    Recommendations:  - CRS following   - continue with outpatient appointment on 04/08 and outpatient Entyvio infusion on 04/10  - Avoid NSAIDs given risk of IBD exacerbation  - DVT prophylaxis- IBD pts at 3-4 fold higher likelihood of DVT, DVT prophylaxis- lovenox, MAR reviewed   - Narcotics increase risk of infection along with morbidity/mortality in IBD patients, tylenol preferred and if pain not controlled with tylenol then short-acting morphine preferred    Thank you for involving us in the care of Ivysharlene Salgado. Please call with any additional questions, concerns or changes in the patient's clinical status.     Shelby Post MD  Gastroenterology Fellow PGY IV   [facility]         [1]   Social History  Socioeconomic History    Marital status: Single   Occupational History     Employer: [employer]   Tobacco Use    Smoking status: Never     Passive exposure: Past    Smokeless tobacco: Never   Substance and Sexual Activity    Alcohol use: Not Currently     Alcohol/week: 0.0 standard drinks of alcohol    Drug use: No    Sexual activity: Yes     Partners: Male     Birth control/protection: See Surgical Hx     Comment: HYST   Other Topics Concern    Are you pregnant or think you may be? No    Breast-feeding No     Social Drivers of Health     Financial Resource Strain: Low Risk  (3/20/2025)    Overall Financial Resource Strain (CARDIA)     Difficulty of Paying Living Expenses: Not very hard   Food Insecurity: No Food Insecurity (3/20/2025)    Hunger Vital Sign     Worried About Running Out of Food in the Last Year: Never true      Ran Out of Food in the Last Year: Never true   Transportation Needs: No Transportation Needs (1/28/2025)    TRANSPORTATION NEEDS     Transportation : No   Physical Activity: Insufficiently Active (11/19/2024)    Exercise Vital Sign     Days of Exercise per Week: 3 days     Minutes of Exercise per Session: 40 min   Stress: No Stress Concern Present (3/20/2025)    [place] [employer] - Occupational Stress Questionnaire     Feeling of Stress : Not at all   Housing Stability: Low Risk  (3/20/2025)    Housing Stability Vital Sign     Unable to Pay for Housing in the Last Year: No     Number of Times Moved in the Last Year: 0     Homeless in the Last Year: No

## 2025-03-20 NOTE — PLAN OF CARE
Problem: Adult Inpatient Plan of Care  Goal: Plan of Care Review  Outcome: Progressing  Goal: Patient-Specific Goal (Individualized)  Outcome: Progressing  Goal: Absence of Hospital-Acquired Illness or Injury  Outcome: Progressing  Goal: Optimal Comfort and Wellbeing  Outcome: Progressing  Goal: Readiness for Transition of Care  Outcome: Progressing   [facility] Plan of Care Note  Dx crohn's with recurrent small bowel obstruction    Shift Events pain is under control with oxycodone q4h; requested anti-nausea med once; no emesis; low appetite; ambulated in the [place] with family; pt did all ostomy care by herself    Goals of Care: pain control; tolerated diet    Neuro: AAOx4    Vital Signs: stable    Respiratory: RA    Diet: low fiber    Is patient tolerating current diet? yes    GTTS: none    Urine Output/Bowel Movement: adequate amount of urine; liquid stool from ostomy    Drains/Tubes/Tube Feeds (include total output/shift): none    Lines: 1 PIV      Accuchecks:none    Skin: ABD  midline with gauze dressing changed by MD this morning and 4 lap sites with steri-strip     Fall Risk Score: 8    Activity level? Up ad neftaly    Any scheduled procedures? none    Any safety concerns? none    Other: none

## 2025-03-20 NOTE — PLAN OF CARE
- Patient is POD #1 following:  Excision of small intestine for recurrent small bowel obstruction 2/2 Crohn's disease; lysis of adhesions; cystoscopy; and liver biopsy.  - Patient did well overnight.   - PRNs given: IV Compazine x 1, oxycodone 10 mg x 1 (early dose OK'd by Surgical Intern).  - Scheduled IV Tylenol and IV ibuprofen infused.  - IV fluids: NS at 40 mL/hr.  - Midline incision has gauze in place. Lap sites x 4 with steri-strips intact. Ileostomy in place with thin, dark brown liquid output in bag - appears c/w bowel sweat currently. Patient states she has had the ileostomy since 2012.  - Ureña in place with orders to remove at 0600 this morning. Urine output is dark pink & cloudy.  - SCDs in place.  - Mother at bedside prior to bedtime.

## 2025-03-20 NOTE — CONSULTS
[facility]  Wound Care    Patient Name:  Ivy Salgado   MRN:  [id number]  Date: 3/20/2025  Diagnosis: Crohn's disease of small intestine with other complication    History:     Past Medical History:   Diagnosis Date    Abnormal Pap smear 2007    Alkaline phosphatase elevation- based on lab from 4/9/2019 05/28/2019    Arthritis     Avascular necrosis of bone of hip, left     Bacterial vaginosis     Crohn disease     Depression 08/05/2017    Drug-induced pancreatitis (imuran)     Encounter for blood transfusion     Generalized anxiety disorder     Genital HSV     GERD (gastroesophageal reflux disease)     Hypertension     Ileostomy in place 07/09/2012    Kidney stone     Long QT syndrome     Melanoma in situ (excised-buttocks 2018, para-stomal site 2019)     Moderate episode of recurrent major depressive disorder 02/02/2024    Multiple thyroid nodules 11/27/2018    Osteopenia of multiple sites 01/07/2019    Pancreas cyst (tail, 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)     Recurrent Clostridioides difficile diarrhea     Recurrent UTI 04/03/2013       Social History[1]    Precautions:     Allergies as of 01/29/2025 - Reviewed 01/27/2025   Allergen Reaction Noted    Azathioprine sodium Other (See Comments) 07/03/2012    Methotrexate analogues Other (See Comments) 02/16/2018    Stelara [ustekinumab] Other (See Comments) 08/31/2018    Zofran [ondansetron hcl (pf)] Other (See Comments) 05/08/2012    Vancomycin analogues Other (See Comments) 02/03/2018    Azathioprine  07/03/2012    Methotrexate  07/24/2012    Morphine Itching and Other (See Comments) 05/08/2012    Zofran [ondansetron hcl]  07/24/2012    Bactrim [sulfamethoxazole-trimethoprim] Palpitations 12/10/2018    Ciprofloxacin Palpitations 12/10/2018       WO Assessment Details/Treatment     Patient seen for inpatient ostomy consult. Patient sitting up in chair and agreeable to care at this time. Patient is an established ostomy patient who cares for her own ostomy. No needs or  concerns at this time. Will continue to follow patient for ostomy needs.        Reviewed chart for this encounter.  See flowsheet for findings.        Discussed POC with patient and primary nurse.  See EMR for orders and patient education.    Bedside nursing to continue care and monitoring.  Bedside to maintain pressure injury prevention interventions.        03/20/25 1000   WOCN Assessment   WOCN Total Time (mins) 30   Visit Date 03/20/25   Visit Time 1000   Consult Type New   WOCN Speciality Ostomy   WOCN List ileostomy   Intervention assessed;changed;applied;chart review;coordination of care;orders   Teaching on-going        Ileostomy 06/16/12 0000 RLQ   Placement Date/Time: 06/16/12 0000   Present Prior to Hospital Arrival?: Yes  Location: RLQ   Stoma Appearance round;rosebud appearance;moist   Appliance 1-piece;intact;no leakage   Treatment Placement checked   Stoma Function flatus;stool   Peristomal Assessment Clean;Intact;Dry   Tolerance no signs/symptoms of discomfort       Orders placed.   Rasheed RESENDIZ, RN, [facility]  03/20/2025         [1]   Social History  Socioeconomic History    Marital status: Single   Occupational History     Employer: [employer]   Tobacco Use    Smoking status: Never     Passive exposure: Past    Smokeless tobacco: Never   Substance and Sexual Activity    Alcohol use: Not Currently     Alcohol/week: 0.0 standard drinks of alcohol    Drug use: No    Sexual activity: Yes     Partners: Male     Birth control/protection: See Surgical Hx     Comment: HYST   Other Topics Concern    Are you pregnant or think you may be? No    Breast-feeding No     Social Drivers of Health     Financial Resource Strain: Low Risk  (3/20/2025)    Overall Financial Resource Strain (CARDIA)     Difficulty of Paying Living Expenses: Not very hard   Food Insecurity: No Food Insecurity (3/20/2025)    Hunger Vital Sign     Worried About Running Out of Food in the Last Year: Never true     Ran Out  of Food in the Last Year: Never true   Transportation Needs: No Transportation Needs (1/28/2025)    TRANSPORTATION NEEDS     Transportation : No   Physical Activity: Insufficiently Active (11/19/2024)    Exercise Vital Sign     Days of Exercise per Week: 3 days     Minutes of Exercise per Session: 40 min   Stress: No Stress Concern Present (3/20/2025)    [place] [employer] - Occupational Stress Questionnaire     Feeling of Stress : Not at all   Housing Stability: Low Risk  (3/20/2025)    Housing Stability Vital Sign     Unable to Pay for Housing in the Last Year: No     Number of Times Moved in the Last Year: 0     Homeless in the Last Year: No

## 2025-03-20 NOTE — PT/OT/SLP EVAL
Occupational Therapy   Evaluation and Discharge Note    Name: Ivy Salgado  MRN: [id number]  Admitting Diagnosis: Crohn's disease of small intestine with other complication  Recent Surgery: Procedure(s) (LRB):  EXCISION, SMALL INTESTINE, LAPAROSCOPIC, ERAS low (N/A)  CYSTOSCOPY,WITH URETERAL CATHETER INSERTION (Bilateral)  LYSIS, ADHESIONS, LAPAROSCOPIC (N/A)  BIOPSY, LIVER (N/A)  LAPAROSCOPY, DIAGNOSTIC (N/A) 1 Day Post-Op    Recommendations:     Discharge Recommendations: No Therapy Indicated  Discharge Equipment Recommendations: none  Barriers to discharge:  None    Assessment:     Ivy Salgado is a 44 y.o. female with a medical diagnosis of Crohn's disease of small intestine with other complication. At this time, patient is functioning at their prior level of function and does not require further acute OT services.     Plan:     During this hospitalization, patient does not require further acute OT services.  Please re-consult if situation changes.    Plan of Care Reviewed with: patient, father    Subjective     Chief Complaint: none noted  Patient/Family Comments/goals: patient agreed to therapy    Occupational Profile:  Living Environment: Patient lives with 17 year old daughter in a townhouse apartment with with 1 RACHEL to enter. Patient has a half bath downstairs. Patient has a full bath and bedroom upstairs with R HR on the flight of stairs to second floor. Her bathroom has a tub shower with no bench and suction cup grab bars. Patient has a low toilet downstairs in half bath and regular toilet upstairs. Both bathrooms do not have grab bars by the toilet.  Patient reports she may go stay at [facility] with no RACHEL to enter. She will have access to a walk in shower with a built in seat and no grab bars. Patient was independent with ADLs and functional mobility PTA.     Pain/Comfort:  Pain Rating 1: 0/10  Pain Rating Post-Intervention 1: 0/10    Patients cultural, spiritual, Confucianism conflicts given the  current situation: no    Objective:     Communicated with: CYNDI prior to session.  Patient found HOB elevated with  (port a cath; ostomy bag) upon OT entry to room.    General Precautions: Standard, fall  Orthopedic Precautions: N/A  Braces: N/A  Respiratory Status: Room air     Occupational Performance:    Bed Mobility:    Patient completed Scooting/Bridging with independence  Patient completed Supine to Sit with independence  Patient completed Sit to Supine with independence    Functional Mobility/Transfers:  Patient completed Sit <> Stand Transfer with modified independence  with  no assistive device   Patient completed Toilet Transfer bed<>toilet technique with modified independence with  no AD    Activities of Daily Living:  Lower Body Dressing: independence Donning and doffing slippers  Toileting: modified independence Patient emptied ostomy bag and then urinated on toilet    Cognitive/Visual Perceptual:  Cognitive/Psychosocial Skills:  -       Oriented to: Person, Place, Time, and Situation   -       Follows Commands/attention:Follows multistep  commands  -       Communication: clear/fluent  -       Memory: No Deficits noted  -       Safety awareness/insight to disability: intact   -       Mood/Affect/Coping skills/emotional control: Appropriate to situation  Visual/Perceptual:      -Intact      Physical Exam:  Upper Extremity Range of Motion:     -       Right Upper Extremity: WFL  -       Left Upper Extremity: WFL  Upper Extremity Strength:    -       Right Upper Extremity: WFL  -       Left Upper Extremity: WFL   Strength:    -       Right Upper Extremity: WFL  -       Left Upper Extremity: WFL  Fine Motor Coordination:    -       Intact  Gross motor coordination:   WFL    AMPAC 6 Click ADL:  AMPAC Total Score: 24    Treatment & Education:  Role of OT and POC  ADL retraining  Functional mobility training  Safety  Discharge planning  Importance EOB/OOB activity    Patient left HOB elevated with all lines  intact, call button in reach, family present, and all needs met.     GOALS:   Multidisciplinary Problems       Occupational Therapy Goals       Not on file              Multidisciplinary Problems (Resolved)          Problem: Occupational Therapy    Goal Priority Disciplines Outcome Interventions   Occupational Therapy Goal   (Resolved)     OT, PT/OT Met                        History:     Past Medical History:   Diagnosis Date    Abnormal Pap smear 2007    Alkaline phosphatase elevation- based on lab from 4/9/2019 05/28/2019    Arthritis     Avascular necrosis of bone of hip, left     Bacterial vaginosis     Crohn disease     Depression 08/05/2017    Drug-induced pancreatitis (imuran)     Encounter for blood transfusion     Generalized anxiety disorder     Genital HSV     GERD (gastroesophageal reflux disease)     Hypertension     Ileostomy in place 07/09/2012    Kidney stone     Long QT syndrome     Melanoma in situ (excised-buttocks 2018, para-stomal site 2019)     Moderate episode of recurrent major depressive disorder 02/02/2024    Multiple thyroid nodules 11/27/2018    Osteopenia of multiple sites 01/07/2019    Pancreas cyst (tail, 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)     Recurrent Clostridioides difficile diarrhea     Recurrent UTI 04/03/2013         Past Surgical History:   Procedure Laterality Date    ABDOMINAL SURGERY      APPENDECTOMY      ARTHROPLASTY OF SHOULDER Left 7/18/2023    Procedure: ARTHROPLASTY, SHOULDER;  Surgeon: Sharon Babb MD;  Location: [facility];  Service: Orthopedics;  Laterality: Left;    AUGMENTATION OF BREAST Bilateral 06/2022    gel implants    BILATERAL SALPINGO-OOPHORECTOMY (BSO) Bilateral 05/30/2019    Procedure: SALPINGO-OOPHORECTOMY, BILATERAL;  Surgeon: Rupa German MD;  Location: [address];  Service: OB/GYN;  Laterality: Bilateral;    BLADDER SURGERY      partial cystectomy due to fistula    breast lift      BREAST SURGERY      [facility]      COLON SURGERY      COLONOSCOPY      CYSTOGRAM   12/11/2024    Procedure: CYSTOGRAM;  Surgeon: Lexi Villalba MD;  Location: [facility] OR;  Service: Urology;;    CYSTOSCOPY  09/23/2020    Procedure: CYSTOSCOPY;  Surgeon: Sascha Florentino MD;  Location: [facility] OR 1ST FLR;  Service: Urology;;    CYSTOSCOPY N/A 12/11/2024    Procedure: CYSTOSCOPY;  Surgeon: Lexi Villalba MD;  Location: [facility] OR;  Service: Urology;  Laterality: N/A;    CYSTOSCOPY W/ URETERAL STENT PLACEMENT Left 09/15/2020    Procedure: CYSTOSCOPY, WITH URETERAL STENT INSERTION;  Surgeon: Sascha Florentino MD;  Location: [facility] OR 1ST FLR;  Service: Urology;  Laterality: Left;    CYSTOSCOPY,WITH URETERAL CATHETER INSERTION Bilateral 3/19/2025    Procedure: CYSTOSCOPY,WITH URETERAL CATHETER INSERTION;  Surgeon: Chris Jones MD;  Location: [facility] OR 2ND FLR;  Service: Urology;  Laterality: Bilateral;    DIAGNOSTIC LAPAROSCOPY N/A 07/09/2020    Procedure: LAPAROSCOPY, DIAGNOSTIC;  Surgeon: Gilson El MD;  Location: [facility] OR;  Service: OB/GYN;  Laterality: N/A;    DIAGNOSTIC LAPAROSCOPY N/A 3/19/2025    Procedure: LAPAROSCOPY, DIAGNOSTIC;  Surgeon: DUNCAN Campbell MD;  Location: [facility] OR 2ND FLR;  Service: Colon and Rectal;  Laterality: N/A;    ESOPHAGOGASTRODUODENOSCOPY N/A 1/3/2024    Procedure: EGD (ESOPHAGOGASTRODUODENOSCOPY);  Surgeon: Lily Lind MD;  Location: [facility] ENDO (2ND FLR);  Service: Endoscopy;  Laterality: N/A;    EXCISION OF MELANOMA  07/17/2019    ILEOSCOPY N/A 1/3/2024    Procedure: ILEOSCOPY;  Surgeon: Lily Lind MD;  Location: [facility] ENDO (2ND FLR);  Service: Endoscopy;  Laterality: N/A;    ILEOSCOPY N/A 1/24/2024    Procedure: ILEOSCOPY;  Surgeon: Lilian Navarro MD;  Location: [facility] ENDO (2ND FLR);  Service: Endoscopy;  Laterality: N/A;  through stoma    ILEOSCOPY N/A 6/4/2024    Procedure: ILEOSCOPY;  Surgeon: Peace Garcia MD;  Location: [facility] ([address] [place] [address]);  Service: Endoscopy;  Laterality: N/A;  Ref By:,instr sent via  lucy,BG  received urgent message to reschedule pt from 7/15  to may or june-updated prep instructions sent-   5/29/24- precall complete - ERW    ILEOSCOPY N/A 12/2/2024    Procedure: ILEOSCOPY;  Surgeon: Peace Garcia MD;  Location: [facility] ENDO (4TH FLR);  Service: Endoscopy;  Laterality: N/A;  Ref By:,[email or web address]  11/22 lvm for precall-  11/27/24 attempted precall no answer LVM MARI  11/29-LVM for pre call.  No answe.-JM/ES  11/29-pt lvm confirming appt-KPvt    ILEOSTOMY      INSERTION OF TUNNELED CENTRAL VENOUS CATHETER (CVC) WITH SUBCUTANEOUS PORT Right 9/10/2024    Procedure: INSERTION, SINGLE LUMEN CATHETER WITH PORT, WITH FLUOROSCOPIC GUIDANCE, Rt neck or chest, prefers chest;  Surgeon: Vinh Lloyd MD;  Location: [facility] OR 2ND FLR;  Service: General;  Laterality: Right;    LAPAROSCOPIC LYSIS OF ADHESIONS N/A 07/09/2020    Procedure: LYSIS, ADHESIONS, LAPAROSCOPIC;  Surgeon: Gilson El MD;  Location: [facility] OR;  Service: OB/GYN;  Laterality: N/A;    LAPAROSCOPIC LYSIS OF ADHESIONS N/A 3/19/2025    Procedure: LYSIS, ADHESIONS, LAPAROSCOPIC;  Surgeon: DUNCAN Campbell MD;  Location: [facility] OR 2ND FLR;  Service: Colon and Rectal;  Laterality: N/A;    LAPAROSCOPIC RESECTION OF SMALL INTESTINE N/A 3/19/2025    Procedure: EXCISION, SMALL INTESTINE, LAPAROSCOPIC, ERAS low;  Surgeon: DUNCAN Campbell MD;  Location: [facility] OR 2ND FLR;  Service: Colon and Rectal;  Laterality: N/A;    LASER LITHOTRIPSY  09/23/2020    Procedure: LITHOTRIPSY, USING LASER;  Surgeon: Sascha Florentino MD;  Location: [facility] OR 1ST FLR;  Service: Urology;;    LIVER BIOPSY N/A 3/19/2025    Procedure: BIOPSY, LIVER;  Surgeon: DUNCAN Campbell MD;  Location: [facility] OR 2ND FLR;  Service: Colon and Rectal;  Laterality: N/A;    LYSIS OF ADHESIONS N/A 05/30/2019    Procedure: LYSIS, ADHESIONS;  Surgeon: Rupa German MD;  Location: [facility] OR [address] [place] [address];  Service: OB/GYN;  Laterality: N/A;    MEDIPORT REMOVAL  Left 9/10/2024    Procedure: REMOVAL, CATHETER, CENTRAL VENOUS, TUNNELED, WITH PORT, Left side;  Surgeon: Vinh Lloyd MD;  Location: [facility] OR 2ND [facility];  Service: General;  Laterality: Left;    OOPHORECTOMY Right 04/16/2015    PORTACATH PLACEMENT  02/21/2017    SKIN BIOPSY      SMALL INTESTINE SURGERY      age 18 Y    TOTAL ABDOMINAL HYSTERECTOMY  04/16/2015    TOTAL COLECTOMY      TUBAL LIGATION  06/06/2012    UPPER GASTROINTESTINAL ENDOSCOPY      URETEROSCOPIC REMOVAL OF URETERIC CALCULUS  09/23/2020    Procedure: REMOVAL, CALCULUS, URETER, URETEROSCOPIC;  Surgeon: Sascha Florentino MD;  Location: [facility] OR [address] [place] [address];  Service: Urology;;    URETEROSCOPY Left 09/23/2020    Procedure: URETEROSCOPY;  Surgeon: Sascha Florentino MD;  Location: [facility] OR [address] [place] [address];  Service: Urology;  Laterality: Left;       Time Tracking:     OT Date of Treatment: 03/20/25  OT Start Time: 1047  OT Stop Time: 1102  OT Total Time (min): 15 min    Billable Minutes:Evaluation 7  Self Care/Home Management 8    3/20/2025

## 2025-03-20 NOTE — PROGRESS NOTES
[facility]  Colorectal Surgery  Progress Note    Patient Name: Ivy Salgado  MRN: [id number]  Admission Date: 3/19/2025  Hospital Length of Stay: 1 days  Attending Physician: DUNCAN Campbell MD    Subjective:     Interval History: NAOE. POD1 from small bowel resection. Had some pain control issues yesterday due to timing of medications, improved today. Having ostomy output. Afebrile, VSS. Good urine output. Tolerating clears without n/v.     Post-Op Info:  Procedure(s) (LRB):  EXCISION, SMALL INTESTINE, LAPAROSCOPIC, ERAS low (N/A)  CYSTOSCOPY,WITH URETERAL CATHETER INSERTION (Bilateral)  LYSIS, ADHESIONS, LAPAROSCOPIC (N/A)  BIOPSY, LIVER (N/A)  LAPAROSCOPY, DIAGNOSTIC (N/A)   1 Day Post-Op      Medications:  Continuous Infusions:   0.9% NaCl   Intravenous Continuous 40 mL/hr at 03/20/25 0630 Rate Verify at 03/20/25 0630     Scheduled Meds:   acetaminophen  1,000 mg Intravenous Q8H    Followed by    acetaminophen  1,000 mg Oral Q8H    clonazePAM  0.5 mg Oral BID    enoxparin  40 mg Subcutaneous Daily    gabapentin  300 mg Oral TID    ibuprofen  800 mg Intravenous Q8H    Followed by    ibuprofen  800 mg Oral Q8H    mirtazapine  30 mg Oral Nightly    mupirocin   Nasal BID    pantoprazole  40 mg Oral BID     PRN Meds:   acetaminophen 1,000 mg/100 mL (10 mg/mL) injection 1,000 mg    Followed by    acetaminophen tablet 1,000 mg    clonazePAM tablet 0.5 mg    enoxaparin injection 40 mg    gabapentin capsule 300 mg    ibuprofen 800 mg in dextrose 5 % 250 mL (Vial-Mate)    Followed by    ibuprofen tablet 800 mg    mirtazapine disintegrating tablet 30 mg    mupirocin 2 % ointment    pantoprazole EC tablet 40 mg        Objective:     Vital Signs (Most Recent):  Temp: 98 °F (36.7 °C) (03/20/25 0753)  Pulse: 74 (03/20/25 0753)  Resp: 18 (03/20/25 0753)  BP: 118/75 (03/20/25 0753)  SpO2: 98 % (03/20/25 0753) Vital Signs (24h Range):  Temp:  [97.5 °F (36.4 °C)-98.1 °F (36.7 °C)] 98 °F (36.7 °C)  Pulse:  [56-75]  74  Resp:  [10-21] 18  SpO2:  [95 %-100 %] 98 %  BP: ()/(52-84) 118/75     Intake/Output - Last 3 Shifts         03/18 0700  03/19 0659 03/19 0700 03/20 0659 03/20 0700  03/21 0659    P.O.  150     I.V. (mL/kg)  1543.9 (28.2)     IV Piggyback  1300     Total Intake(mL/kg)  2993.9 (54.6)     Urine (mL/kg/hr)  1445     Stool  35     Total Output  1480     Net  +1513.9                     Physical Exam  Constitutional:       General: She is not in acute distress.  HENT:      Head: Normocephalic and atraumatic.   Eyes:      Conjunctiva/sclera: Conjunctivae normal.   Cardiovascular:      Rate and Rhythm: Normal rate and regular rhythm.   Pulmonary:      Effort: Pulmonary effort is normal. No respiratory distress.   Abdominal:      General: There is no distension.      Palpations: Abdomen is soft.      Comments: Surgical incision c/d/I, minimal serosanguinous drainage  Stoma w/ pink, viable mucosa. Dark bilious output in appliance  Appropriately tender to palpation   Musculoskeletal:         General: No swelling.   Skin:     General: Skin is warm and dry.      Capillary Refill: Capillary refill takes less than 2 seconds.   Neurological:      General: No focal deficit present.      Mental Status: She is alert.          Significant Labs:  BMP (Last 3 Results):   Recent Labs   Lab 03/20/25  0422         K 3.5      CO2 21*   BUN 8   CREATININE 0.7   CALCIUM 8.0*   MG 1.8     CBC (Last 3 Results):   Recent Labs   Lab 03/20/25  0422   WBC 11.34   RBC 3.76*   HGB 10.5*   HCT 32.4*      MCV 86   MCH 27.9   MCHC 32.4       Significant Diagnostics:  I have reviewed all pertinent imaging results/findings within the past 24 hours.  Assessment/Plan:     * Crohn's disease of ileum with end ileostomy  45 y/o F with a hx of Crohn's s/p completion proctocolectomy with end ileostomy in 6/2012 with recurrent SBO. Now s/p dx lap converted to open with JUAN and resection of prior anastomosis on 3/19.     -  MMPC  - Advance to regular diet   - dc mIVF  - Up OOB, PT/OT   - DVT ppx   - Likely dc home in the coming days            Milagros Nichole MD  Colorectal Surgery  [facility]  [facility]    Attestation:     Patient seen and examined with the residents.  Corrections made to the note above.      Patient progressing well, increasing output.  Tolerating clears are advancing today

## 2025-03-20 NOTE — PLAN OF CARE
Plan of care reviewed with pt:  -Transferred from PACU and was moaning in pain;  notified- MD ordered to give pt all her scheduled pain meds that were jevon tonight early; 1 time dose dilaudid 0.5 mg given- pain relieved  -AAOx4, on 2L of O2, VS stable  -ABD midline incision with ster-strip with small amount ss drainage- gauze applied-  notified  -4 lap site incision with steri-strip CDI MICHEL  -Old ileostomy bag changed ( lifted up from skin)- scant amount of liquid brown stool  -Denied nausea  -SCD applied, call light in reach, WC

## 2025-03-20 NOTE — ASSESSMENT & PLAN NOTE
45 y/o F with a hx of Crohn's s/p completion proctocolectomy with end ileostomy in 6/2012 with recurrent SBO. Now s/p dx lap converted to open with JUAN and resection of prior anastomosis on 3/19.     - MMPC  - Advance to regular diet   - dc mIVF  - Up OOB, PT/OT   - DVT ppx   - Likely dc home in the coming days

## 2025-03-21 ENCOUNTER — PATIENT MESSAGE (OUTPATIENT)
Dept: SURGERY | Facility: CLINIC | Age: 45
End: 2025-03-21
Payer: COMMERCIAL

## 2025-03-21 VITALS
DIASTOLIC BLOOD PRESSURE: 64 MMHG | SYSTOLIC BLOOD PRESSURE: 108 MMHG | WEIGHT: 120.81 LBS | OXYGEN SATURATION: 98 % | BODY MASS INDEX: 22.81 KG/M2 | RESPIRATION RATE: 18 BRPM | HEIGHT: 61 IN | HEART RATE: 76 BPM | TEMPERATURE: 98 F

## 2025-03-21 LAB
FINAL PATHOLOGIC DIAGNOSIS: NORMAL
GROSS: NORMAL
Lab: NORMAL
MICROSCOPIC EXAM: NORMAL

## 2025-03-21 PROCEDURE — 25000003 PHARM REV CODE 250: Performed by: STUDENT IN AN ORGANIZED HEALTH CARE EDUCATION/TRAINING PROGRAM

## 2025-03-21 PROCEDURE — 63600175 PHARM REV CODE 636 W HCPCS: Performed by: COLON & RECTAL SURGERY

## 2025-03-21 PROCEDURE — 25000003 PHARM REV CODE 250: Performed by: NURSE PRACTITIONER

## 2025-03-21 RX ORDER — SODIUM CHLORIDE 0.9 % (FLUSH) 0.9 %
20 SYRINGE (ML) INJECTION
Status: DISCONTINUED | OUTPATIENT
Start: 2025-03-21 | End: 2025-03-21 | Stop reason: HOSPADM

## 2025-03-21 RX ORDER — OXYCODONE HYDROCHLORIDE 5 MG/1
5 TABLET ORAL EVERY 4 HOURS PRN
Qty: 20 TABLET | Refills: 0 | Status: SHIPPED | OUTPATIENT
Start: 2025-03-21 | End: 2025-03-26 | Stop reason: SDUPTHER

## 2025-03-21 RX ORDER — HEPARIN 100 UNIT/ML
500 SYRINGE INTRAVENOUS
Status: DISCONTINUED | OUTPATIENT
Start: 2025-03-21 | End: 2025-03-21 | Stop reason: HOSPADM

## 2025-03-21 RX ORDER — SODIUM CHLORIDE 0.9 % (FLUSH) 0.9 %
10 SYRINGE (ML) INJECTION
Status: DISCONTINUED | OUTPATIENT
Start: 2025-03-21 | End: 2025-03-21 | Stop reason: HOSPADM

## 2025-03-21 RX ORDER — LOPERAMIDE HYDROCHLORIDE 2 MG/1
4 CAPSULE ORAL 3 TIMES DAILY PRN
Qty: 84 CAPSULE | Refills: 2 | Status: ON HOLD | OUTPATIENT
Start: 2025-03-21

## 2025-03-21 RX ADMIN — HEPARIN 500 UNITS: 100 SYRINGE at 05:03

## 2025-03-21 RX ADMIN — IBUPROFEN 800 MG: 400 TABLET ORAL at 01:03

## 2025-03-21 RX ADMIN — OXYCODONE HYDROCHLORIDE 10 MG: 10 TABLET ORAL at 09:03

## 2025-03-21 RX ADMIN — PANTOPRAZOLE SODIUM 40 MG: 40 TABLET, DELAYED RELEASE ORAL at 09:03

## 2025-03-21 RX ADMIN — ACETAMINOPHEN 1000 MG: 500 TABLET ORAL at 01:03

## 2025-03-21 RX ADMIN — CLONAZEPAM 0.5 MG: 0.5 TABLET ORAL at 09:03

## 2025-03-21 RX ADMIN — GABAPENTIN 300 MG: 300 CAPSULE ORAL at 02:03

## 2025-03-21 RX ADMIN — OXYCODONE HYDROCHLORIDE 10 MG: 10 TABLET ORAL at 04:03

## 2025-03-21 RX ADMIN — GABAPENTIN 300 MG: 300 CAPSULE ORAL at 09:03

## 2025-03-21 RX ADMIN — IBUPROFEN 800 MG: 400 TABLET ORAL at 05:03

## 2025-03-21 RX ADMIN — MUPIROCIN: 20 OINTMENT TOPICAL at 09:03

## 2025-03-21 RX ADMIN — OXYCODONE HYDROCHLORIDE 10 MG: 10 TABLET ORAL at 01:03

## 2025-03-21 RX ADMIN — ACETAMINOPHEN 1000 MG: 500 TABLET ORAL at 05:03

## 2025-03-21 NOTE — PLAN OF CARE
"[facility] Plan of Care Note    Dx:   Crohn's disease of small intestine with intestinal obstruction [K50.012]  SBO (small bowel obstruction) [K56.609]    Shift Events: Pt changed ostomy bag once during shift. No distress noted. Uneventful shift. Pain controlled.     Goals of Care: Goals of care ongoing and progressing.     Neuro: A/Ox4    Vital Signs: /73   Pulse 67   Temp 98.1 °F (36.7 °C) (Oral)   Resp 18   Ht 5' 1" (1.549 m)   Wt 54.8 kg (120 lb 13 oz)   LMP 03/30/2015   SpO2 96%   Breastfeeding No   BMI 22.83 kg/m²     Respiratory: RA    Diet: Diet Adult Regular      Is patient tolerating current diet? Yes    GTTS: NA    Urine Output/Bowel Movement:   I/O this shift:  In: -   Out: 300 [Urine:300]  Last Bowel Movement: 03/20/25 (ostomy)      Drains/Tubes/Tube Feeds (include total output/shift):   I/O this shift:  In: -   Out: 300 [Urine:300]  Ostomy      Lines: Port       Accuchecks: NA    Skin: ML ABD incision     Fall Risk Score: Low    Activity level? Independent     Any scheduled procedures? NA    Any safety concerns? NA      Problem: Adult Inpatient Plan of Care  Goal: Plan of Care Review  Outcome: Progressing  Goal: Patient-Specific Goal (Individualized)  Outcome: Progressing  Goal: Absence of Hospital-Acquired Illness or Injury  Outcome: Progressing  Goal: Optimal Comfort and Wellbeing  Outcome: Progressing  Goal: Readiness for Transition of Care  Outcome: Progressing     Problem: Infection  Goal: Absence of Infection Signs and Symptoms  Outcome: Progressing     Problem: Wound  Goal: Optimal Coping  Outcome: Progressing  Goal: Optimal Functional Ability  Outcome: Progressing  Goal: Absence of Infection Signs and Symptoms  Outcome: Progressing  Goal: Improved Oral Intake  Outcome: Progressing  Goal: Optimal Pain Control and Function  Outcome: Progressing  Goal: Skin Health and Integrity  Outcome: Progressing  Goal: Optimal Wound Healing  Outcome: Progressing     Problem: Fall Injury Risk  Goal: " Absence of Fall and Fall-Related Injury  Outcome: Progressing     Problem: Skin Injury Risk Increased  Goal: Skin Health and Integrity  Outcome: Progressing     Problem: Surgery Nonspecified  Goal: Absence of Bleeding  Outcome: Progressing  Goal: Effective Bowel Elimination  Outcome: Progressing  Goal: Fluid and Electrolyte Balance  Outcome: Progressing  Goal: Blood Glucose Level Within Targeted Range  Outcome: Progressing  Goal: Absence of Infection Signs and Symptoms  Outcome: Progressing  Goal: Anesthesia/Sedation Recovery  Outcome: Progressing  Goal: Optimal Pain Control and Function  Outcome: Progressing  Goal: Nausea and Vomiting Relief  Outcome: Progressing  Goal: Effective Urinary Elimination  Outcome: Progressing  Goal: Effective Oxygenation and Ventilation  Outcome: Progressing

## 2025-03-21 NOTE — DISCHARGE INSTRUCTIONS
Post-Operative Instructions    PAIN CONTROL  Surgery may cause different types of pain and discomfort including abdominal soreness and discomfort, incisional pain, muscle spasm, bloating or constipation.    Starting after surgery, take 650 mg of acetaminophen (Tylenol) every 6-8 hours.  Do not exceed 4000 mg per day.  Starting the day after your surgery, take 800 mg ibuprofen (Advil or Motrin) every 6-8 hours. Do not exceed 3200 mg per day.  Alternate between 650 mg acetaminophen and 800 mg ibuprofen every 3 hours for at least the first three days after surgery for pain control.  For example, take acetaminophen at 7 AM, then 3 hours later at 10 AM take ibuprofen, then 3 hours later at 1 PM take acetaminophen and 3 hours later at 4 PM take ibuprofen, etc...  Most patients do not need narcotic medication.  Narcotics often cause stomach upset and constipation and should be used sparingly.  A printed script or limited dose of narcotic medication (oxycodone) may be provided.  Take imodium as needed to prevent dehydration    INCISION CARE  Your incisions are covered with steristrips. This will remain on until your follow up visit with your surgeon.  Some bruising and swelling around the incisions is normal, especially around larger incisions.  The bruising and swelling will gradually go away.  You can use ice or heat packs on your incisions if that helps with pain.  Use them for 30 minutes, then take them off for 30 minutes before using them again.    HOME MEDICATIONS  You may resume taking your normal medications, with the EXCEPTION of the following:  Wait 3 days after your surgery before taking the following medications unless otherwise instructed by your surgeon or internist/cardiologist: Aspirin or aspirin containing medications (i.e. Goody's, Excedrin), Apixaban (Eliquis), Clopidogrel (Plavix), Dabigatran (Pradaxa), Dipyridamole (Aggrenox), Rivaroxaban (Xarelto), Warfarin (Coumadin), or any other type of blood  thinner you may be taking.  Please wait 1 week after surgery to restart herbal medications, vitamins or minerals    ACTIVITIES  You may shower 2 days after surgery.  No tub bathing or swimming (do not submerge in water) until cleared by your surgeon.   You may gradually resume normal activities, depending on your energy level.    Please refrain from driving for at least 3 days, or until you can twist and look over your shoulder without hesitating.  Do not drive if you are taking narcotic pain medication.    No lifting or pulling more than 10 lbs for 6-8 weeks or until cleared by your surgeon.    DIET  You have no dietary or food restrictions.  You can eat the foods you normally eat.      QUESTIONS OR CONCERNS  If you have any questions or concerns during the daytime, please send a [employer] message to your surgeon or call the surgeon's office at [phone].    On nights and weekends, please call [phone] and ask for the General Surgery Resident on call.    For emergencies, difficulty breathing or shortness of breath, please call [phone], or report to the closest Emergency Department    Please notify your provider if you experience any of the following:  Bleeding, redness, warm to the touch, swelling, drainage from surgical incisions, bad smell from incision  Your pain level increases and does not improve with the pain medicines you have been given  Temperature greater than 101.5 F (38.1 C) degrees, chills  Inability to eat, drink fluids, or take medications  Nausea or vomiting  Dizziness or passing out  Develop a rash  Have pain or trouble urinating, or you pass blood in your urine  Develop a new cough  You are constipated or have diarrhea    UPCOMING APPOINTMENTS  Future Appointments   Date Time Provider Department Center   4/1/2025  2:00 PM Tawanda Mahajan MD [facility] OBBEL[facility]   4/8/2025  9:00 AM Peace Garcia MD [facility] ABBY Roman   4/10/2025 10:30 AM CHAIR Jessica [facility] INFUSION [facility] ENEIDA Perez    4/16/2025  2:45 PM Sharon Babb MD [facility] SPORTS [place]   5/13/2025  7:00 AM LAB, APPOINTMENT [facility]   5/27/2025 11:00 AM Natali Soto MD [facility] HEPAT [facility]

## 2025-03-21 NOTE — ASSESSMENT & PLAN NOTE
43 y/o F with a hx of Crohn's s/p completion proctocolectomy with end ileostomy in 6/2012 with recurrent SBO. Now s/p dx lap converted to open with JUAN and resection of prior anastomosis on 3/19.     - MMPC  - Continue regular diet   - Up OOB  - DVT ppx   - Likely dc home this afternoon.

## 2025-03-21 NOTE — HOSPITAL COURSE
Patient admitted and underwent the above procedures which she tolerated. Post-operatively did well. Labs and vitals stable. Patient pain well controlled, tolerating diet, voiding appropriately, having bowel function, and ambulating. Deemed okay for discharge.

## 2025-03-21 NOTE — DISCHARGE SUMMARY
[facility]  Colorectal Surgery  Discharge Summary      Patient Name: Ivy Salgado  MRN: [id number]  Admission Date: 3/19/2025  Hospital Length of Stay: 2 days  Discharge Date and Time:  03/21/2025 3:21 PM  Attending Physician: DUNCAN Campbell MD   Discharging Provider: Sallie Martinez MD  Primary Care Provider: Luis Madden,      HPI:  No notes on file    Procedure(s) (LRB):  EXCISION, SMALL INTESTINE, LAPAROSCOPIC, ERAS low (N/A)  CYSTOSCOPY,WITH URETERAL CATHETER INSERTION (Bilateral)  LYSIS, ADHESIONS, LAPAROSCOPIC (N/A)  BIOPSY, LIVER (N/A)  LAPAROSCOPY, DIAGNOSTIC (N/A)     Hospital Course:  Patient admitted and underwent the above procedures which she tolerated. Post-operatively did well. Labs and vitals stable. Patient pain well controlled, tolerating diet, voiding appropriately, having bowel function, and ambulating. Deemed okay for discharge.     Goals of Care Treatment Preferences:  Code Status: Full Code    Living Will: Yes                  Significant Diagnostic Studies: N/A    Pending Diagnostic Studies:       Procedure Component Value Units Date/Time    Specimen to Pathology, Surgery Other [[id number]] Collected: 03/19/25 1308    Order Status: Sent Lab Status: In process Updated: 03/19/25 1447    Specimen: Tissue           Final Active Diagnoses:    Diagnosis Date Noted POA    PRINCIPAL PROBLEM:  Crohn's disease of ileum with end ileostomy [K50.018] 09/18/2013 Yes      Problems Resolved During this Admission:      Discharged Condition: good    Disposition: Home or Self Care    Follow Up:   Follow-up Information       DUNCAN Campbell MD Follow up in 2 week(s).    Specialty: Colon and Rectal Surgery  Why: For wound re-check  Contact information:  [address] [address]  [phone]                           Patient Instructions:      Diet Adult Regular     Other restrictions (specify):   Order Comments: See patient instructions     Notify your health care  provider if you experience any of the following:  increased confusion or weakness     Notify your health care provider if you experience any of the following:  persistent dizziness, light-headedness, or visual disturbances     Notify your health care provider if you experience any of the following:  worsening rash     Notify your health care provider if you experience any of the following:  severe persistent headache     Notify your health care provider if you experience any of the following:  difficulty breathing or increased cough     Notify your health care provider if you experience any of the following:  redness, tenderness, or signs of infection (pain, swelling, redness, odor or green/yellow discharge around incision site)     Notify your health care provider if you experience any of the following:  severe uncontrolled pain     Notify your health care provider if you experience any of the following:  persistent nausea and vomiting or diarrhea     Notify your health care provider if you experience any of the following:  temperature >100.4     No dressing needed     Medications:  Reconciled Home Medications:      Medication List        START taking these medications      oxyCODONE 5 MG immediate release tablet  Commonly known as: ROXICODONE  Take 1 tablet (5 mg total) by mouth every 4 (four) hours as needed.            CHANGE how you take these medications      * loperamide 1 mg/7.5 mL solution  Commonly known as: IMODIUM  Take 4 mg by mouth 3 (three) times daily as needed for Diarrhea.  What changed: Another medication with the same name was added. Make sure you understand how and when to take each.     * loperamide 2 mg capsule  Commonly known as: IMODIUM  Take 2 capsules (4 mg total) by mouth 3 (three) times daily as needed for Diarrhea.  What changed: You were already taking a medication with the same name, and this prescription was added. Make sure you understand how and when to take each.           * This  list has 2 medication(s) that are the same as other medications prescribed for you. Read the directions carefully, and ask your doctor or other care provider to review them with you.                CONTINUE taking these medications      clonazePAM 1 MG tablet  Commonly known as: KlonoPIN  Take 1 tablet (1 mg total) by mouth 2 (two) times daily.     cyclobenzaprine 10 MG tablet  Commonly known as: FLEXERIL  Take 1 tablet (10 mg total) by mouth every evening as needed for muscle pain     droNABinol 10 MG capsule  Commonly known as: MARINOL  Take 1 capsule (10 mg total) by mouth once daily.     estradiol 0.025 mg/24 hr 0.025 mg/24 hr  Place 1 patch onto the skin once a week. CHANGES ON MONDAYS     gabapentin 300 MG capsule  Commonly known as: NEURONTIN  Take 1 capsule (300 mg total) by mouth 2 (two) times daily.     mirtazapine 30 MG disintegrating tablet  Commonly known as: REMERON SOL-TAB  Take 1 tablet (30 mg total) by mouth nightly.     multivitamin per tablet  Commonly known as: THERAGRAN  Take 1 tablet by mouth once daily.     nadoloL 40 MG tablet  Commonly known as: CORGARD  Take 1 tablet (40 mg total) by mouth once daily. Patient needs appointment before next refill.     pantoprazole 40 MG tablet  Commonly known as: PROTONIX  Take 1 tablet (40 mg total) by mouth 2 (two) times daily.     promethazine 25 MG tablet  Commonly known as: PHENERGAN  Take 1 tablet (25 mg total) by mouth every 6 (six) hours as needed for Nausea.     tamsulosin 0.4 mg Cap  Commonly known as: FLOMAX  Take 1 capsule (0.4 mg total) by mouth once daily.     valACYclovir 500 MG tablet  Commonly known as: VALTREX  Take 1 tablet (500 mg total) by mouth once daily.     vedolizumab 300 mg Solr injection  Commonly known as: ENTYVIO  Inject 300 mg into the vein every 8 weeks.     zolpidem 10 mg Tab  Commonly known as: AMBIEN  Take 1 tablet (10 mg total) by mouth every evening.              Sallie Martinez MD  Colorectal Surgery  [facility]  [facility]

## 2025-03-21 NOTE — PT/OT/SLP PROGRESS
Physical Therapy      Patient Name:  Ivy Salgado   MRN:  [id number]    Patient screened and discharged. Seen ambulating independently in hallway. No additional PT needs at this time.    3/21/2025

## 2025-03-21 NOTE — SUBJECTIVE & OBJECTIVE
Subjective:     Interval History: NAOE. Patient doing well this morning, pain is well controlled. Ambulated in the velasco yesterday. Tolerating a diet, appropriate ostomy output.     Post-Op Info:  Procedure(s) (LRB):  EXCISION, SMALL INTESTINE, LAPAROSCOPIC, ERAS low (N/A)  CYSTOSCOPY,WITH URETERAL CATHETER INSERTION (Bilateral)  LYSIS, ADHESIONS, LAPAROSCOPIC (N/A)  BIOPSY, LIVER (N/A)  LAPAROSCOPY, DIAGNOSTIC (N/A)   2 Days Post-Op      Medications:  Continuous Infusions:  Scheduled Meds:   acetaminophen  1,000 mg Oral Q8H    clonazePAM  0.5 mg Oral BID    enoxparin  40 mg Subcutaneous Daily    gabapentin  300 mg Oral TID    ibuprofen  800 mg Oral Q8H    mirtazapine  30 mg Oral Nightly    mupirocin   Nasal BID    nadoloL  40 mg Oral QHS    pantoprazole  40 mg Oral BID     PRN Meds:   acetaminophen tablet 1,000 mg    clonazePAM tablet 0.5 mg    enoxaparin injection 40 mg    gabapentin capsule 300 mg    ibuprofen tablet 800 mg    mirtazapine disintegrating tablet 30 mg    mupirocin 2 % ointment    nadoloL tablet 40 mg    pantoprazole EC tablet 40 mg        Objective:     Vital Signs (Most Recent):  Temp: 97.8 °F (36.6 °C) (03/21/25 0416)  Pulse: 73 (03/21/25 0416)  Resp: 18 (03/21/25 0427)  BP: 107/74 (03/21/25 0416)  SpO2: 96 % (03/21/25 0416) Vital Signs (24h Range):  Temp:  [97.8 °F (36.6 °C)-98.3 °F (36.8 °C)] 97.8 °F (36.6 °C)  Pulse:  [67-90] 73  Resp:  [12-19] 18  SpO2:  [93 %-98 %] 96 %  BP: ()/(59-93) 107/74     Intake/Output - Last 3 Shifts         03/19 0700  03/20 0659 03/20 0700  03/21 0659 03/21 0700  03/22 0659    P.O. 150 1740     I.V. (mL/kg) 1543.9 (28.2) 74 (1.4)     IV Piggyback 1300 350     Total Intake(mL/kg) 2993.9 (54.6) 2164 (39.5)     Urine (mL/kg/hr) 1445 1300 (1)     Stool 35 1050     Total Output 1480 2350     Net +1513.9 -186            Urine Occurrence  3 x              Physical Exam  Constitutional:       General: She is not in acute distress.  HENT:      Head: Normocephalic  "and atraumatic.   Eyes:      Conjunctiva/sclera: Conjunctivae normal.   Cardiovascular:      Rate and Rhythm: Normal rate and regular rhythm.   Pulmonary:      Effort: Pulmonary effort is normal. No respiratory distress.   Abdominal:      General: There is no distension.      Palpations: Abdomen is soft.      Comments: Surgical incision c/d/I, minimal serosanguinous drainage  Stoma w/ pink, viable mucosa. Dark bilious output in appliance  Appropriately tender to palpation   Musculoskeletal:         General: No swelling.   Skin:     General: Skin is warm and dry.      Capillary Refill: Capillary refill takes less than 2 seconds.   Neurological:      General: No focal deficit present.      Mental Status: She is alert.            Significant Labs:  BMP (Last 3 Results):   Recent Labs   Lab 03/20/25  0422         K 3.5      CO2 21*   BUN 8   CREATININE 0.7   CALCIUM 8.0*   MG 1.8     CBC (Last 3 Results):   Recent Labs   Lab 03/20/25  0422   WBC 11.34   RBC 3.76*   HGB 10.5*   HCT 32.4*      MCV 86   MCH 27.9   MCHC 32.4     CRP (Last 3 Results): No results for input(s): "CRP" in the last 168 hours.    Significant Diagnostics:  I have reviewed all pertinent imaging results/findings within the past 24 hours.  "

## 2025-03-21 NOTE — PROGRESS NOTES
[facility]  Colorectal Surgery  Progress Note    Patient Name: Ivy Salgado  MRN: [id number]  Admission Date: 3/19/2025  Hospital Length of Stay: 2 days  Attending Physician: DUNCAN Campbell MD    Subjective:     Interval History: NAOE. Patient doing well this morning, pain is well controlled. Ambulated in the velasco yesterday. Tolerating a diet, appropriate ostomy output.     Post-Op Info:  Procedure(s) (LRB):  EXCISION, SMALL INTESTINE, LAPAROSCOPIC, ERAS low (N/A)  CYSTOSCOPY,WITH URETERAL CATHETER INSERTION (Bilateral)  LYSIS, ADHESIONS, LAPAROSCOPIC (N/A)  BIOPSY, LIVER (N/A)  LAPAROSCOPY, DIAGNOSTIC (N/A)   2 Days Post-Op      Medications:  Continuous Infusions:  Scheduled Meds:   acetaminophen  1,000 mg Oral Q8H    clonazePAM  0.5 mg Oral BID    enoxparin  40 mg Subcutaneous Daily    gabapentin  300 mg Oral TID    ibuprofen  800 mg Oral Q8H    mirtazapine  30 mg Oral Nightly    mupirocin   Nasal BID    nadoloL  40 mg Oral QHS    pantoprazole  40 mg Oral BID     PRN Meds:   acetaminophen tablet 1,000 mg    clonazePAM tablet 0.5 mg    enoxaparin injection 40 mg    gabapentin capsule 300 mg    ibuprofen tablet 800 mg    mirtazapine disintegrating tablet 30 mg    mupirocin 2 % ointment    nadoloL tablet 40 mg    pantoprazole EC tablet 40 mg        Objective:     Vital Signs (Most Recent):  Temp: 97.8 °F (36.6 °C) (03/21/25 0416)  Pulse: 73 (03/21/25 0416)  Resp: 18 (03/21/25 0427)  BP: 107/74 (03/21/25 0416)  SpO2: 96 % (03/21/25 0416) Vital Signs (24h Range):  Temp:  [97.8 °F (36.6 °C)-98.3 °F (36.8 °C)] 97.8 °F (36.6 °C)  Pulse:  [67-90] 73  Resp:  [12-19] 18  SpO2:  [93 %-98 %] 96 %  BP: ()/(59-93) 107/74     Intake/Output - Last 3 Shifts         03/19 0700  03/20 0659 03/20 0700 03/21 0659 03/21 0700 03/22 0659    P.O. 150 1740     I.V. (mL/kg) 1543.9 (28.2) 74 (1.4)     IV Piggyback 1300 350     Total Intake(mL/kg) 2993.9 (54.6) 2164 (39.5)     Urine (mL/kg/hr) 1445 1300 (1)     Stool 35  "1050     Total Output 1480 2350     Net +1513.9 -186            Urine Occurrence  3 x              Physical Exam  Constitutional:       General: She is not in acute distress.  HENT:      Head: Normocephalic and atraumatic.   Eyes:      Conjunctiva/sclera: Conjunctivae normal.   Cardiovascular:      Rate and Rhythm: Normal rate and regular rhythm.   Pulmonary:      Effort: Pulmonary effort is normal. No respiratory distress.   Abdominal:      General: There is no distension.      Palpations: Abdomen is soft.      Comments: Surgical incision c/d/I, minimal serosanguinous drainage  Stoma w/ pink, viable mucosa. Dark bilious output in appliance  Appropriately tender to palpation   Musculoskeletal:         General: No swelling.   Skin:     General: Skin is warm and dry.      Capillary Refill: Capillary refill takes less than 2 seconds.   Neurological:      General: No focal deficit present.      Mental Status: She is alert.            Significant Labs:  BMP (Last 3 Results):   Recent Labs   Lab 03/20/25  0422         K 3.5      CO2 21*   BUN 8   CREATININE 0.7   CALCIUM 8.0*   MG 1.8     CBC (Last 3 Results):   Recent Labs   Lab 03/20/25  0422   WBC 11.34   RBC 3.76*   HGB 10.5*   HCT 32.4*      MCV 86   MCH 27.9   MCHC 32.4     CRP (Last 3 Results): No results for input(s): "CRP" in the last 168 hours.    Significant Diagnostics:  I have reviewed all pertinent imaging results/findings within the past 24 hours.  Assessment/Plan:     * Crohn's disease of ileum with end ileostomy  43 y/o F with a hx of Crohn's s/p completion proctocolectomy with end ileostomy in 6/2012 with recurrent SBO. Now s/p dx lap converted to open with JUAN and resection of prior anastomosis on 3/19.     - MMPC  - Continue regular diet   - Up OOB  - DVT ppx   - Likely dc home this afternoon.            Milagros Nichole MD  Colorectal Surgery  [facility]        "

## 2025-03-24 ENCOUNTER — PATIENT OUTREACH (OUTPATIENT)
Dept: ADMINISTRATIVE | Facility: CLINIC | Age: 45
End: 2025-03-24
Payer: COMMERCIAL

## 2025-03-24 NOTE — PROGRESS NOTES
C3 nurse spoke with Ivy Salgado  for a TCC post hospital discharge follow up call. The patient has a scheduled HOSFU appointment with Luis Madden DO  on 03/31/2025 @ 3:40 PM.

## 2025-03-26 ENCOUNTER — PATIENT MESSAGE (OUTPATIENT)
Dept: SURGERY | Facility: CLINIC | Age: 45
End: 2025-03-26
Payer: COMMERCIAL

## 2025-03-26 DIAGNOSIS — K56.609 SBO (SMALL BOWEL OBSTRUCTION): ICD-10-CM

## 2025-03-26 RX ORDER — OXYCODONE HYDROCHLORIDE 5 MG/1
5 TABLET ORAL EVERY 6 HOURS PRN
Qty: 20 TABLET | Refills: 0 | Status: ON HOLD | OUTPATIENT
Start: 2025-03-26

## 2025-03-26 NOTE — PROGRESS NOTES
Chief Complaint: left shoulder pain    HISTORY OF PRESENT ILLNESS:   Patient presents to clinic for post op evaluation of left shoulder after below listed surgery. Pain today 8/10. Denies nausea, vomiting, fever, chills. Doing well but she is having severe pain of her shoulder. She is taking oxycodone and tramadol. She also has taken toradol with little pain relief.     Wearing sling which is in place.  With her father and friend today.                                                                                No issues reported    DATE OF PROCEDURE: 07/18/2023  SURGEON: Sharon Babb M.D.   PROCEDURE PERFORMED:   left  1. Total shoulder arthroplasty (complex, -22 modifier)  2. Shoulder lysis of adhesions  3. Shoulder subpectoral biceps tenodesis (CPT 10782)       Review of Systems   Constitution: Negative. Negative for chills, fever and night sweats.    Cardiovascular: Negative for chest pain and syncope.   Respiratory: Negative for cough and shortness of breath.    Gastrointestinal: Negative for abdominal pain and bowel incontinence.    PE:    Vitals:    07/31/23 1321   BP: 111/73   Pulse: 63       Incision clean/dry/intact  Steri strips intact over incision  No sign of infection  Mild- swelling noted  Compartments soft  Forearm soft and not tender bilateral  Neurovascular status intact in extremity  Radial pulses intact and 2+. Capillary refill is <3 seconds. No Discoloration.   No blood clots suspected.    RADIOGRAPHS:  2 views.  left shoulder arthroplasty identified.  The hardware and shoulder alignment is satisfactory and unchanged as compared to the previous study.    Assessment:  Appropriate left TSA surgery recovery at 2 weeks    Will call in flexeril for her to take to see if it helps pain at night.   Start moving shoulder more then doing desk and counter activities.   2. Evaluated incision. No signs of infection.  3. May shower now without covering incisions.  4. Continue ASA 81mg once a day.  5. Start  PT 3 weeks post-op per protocol.   6. Discussed case with PT.   RTC in 4 weeks with Dr. Babb for 6 week post op appt.      DC instructions

## 2025-03-27 ENCOUNTER — HOSPITAL ENCOUNTER (INPATIENT)
Facility: HOSPITAL | Age: 45
LOS: 1 days | Discharge: HOME | DRG: 389 | End: 2025-03-31
Attending: EMERGENCY MEDICINE | Admitting: COLON & RECTAL SURGERY
Payer: COMMERCIAL

## 2025-03-27 DIAGNOSIS — K50.018 CROHN'S DISEASE OF SMALL INTESTINE WITH OTHER COMPLICATION: ICD-10-CM

## 2025-03-27 DIAGNOSIS — Z95.828 PORT-A-CATH IN PLACE: ICD-10-CM

## 2025-03-27 DIAGNOSIS — Z98.890 S/P EXPLORATORY LAPAROTOMY: ICD-10-CM

## 2025-03-27 DIAGNOSIS — R10.9 ABDOMINAL PAIN, UNSPECIFIED ABDOMINAL LOCATION: Primary | ICD-10-CM

## 2025-03-27 DIAGNOSIS — R07.9 CHEST PAIN: ICD-10-CM

## 2025-03-27 LAB
ABSOLUTE EOSINOPHIL (OHS): 0.18 K/UL
ABSOLUTE MONOCYTE (OHS): 0.74 K/UL (ref 0.3–1)
ABSOLUTE NEUTROPHIL COUNT (OHS): 4.17 K/UL (ref 1.8–7.7)
ALBUMIN SERPL BCP-MCNC: 3.2 G/DL (ref 3.5–5.2)
ALP SERPL-CCNC: 298 UNIT/L (ref 40–150)
ALT SERPL W/O P-5'-P-CCNC: 29 UNIT/L (ref 10–44)
ANION GAP (OHS): 10 MMOL/L (ref 8–16)
AST SERPL-CCNC: 24 UNIT/L (ref 11–45)
BASOPHILS # BLD AUTO: 0.03 K/UL
BASOPHILS NFR BLD AUTO: 0.4 %
BILIRUB SERPL-MCNC: 0.3 MG/DL (ref 0.1–1)
BILIRUB UR QL STRIP.AUTO: NEGATIVE
BUN SERPL-MCNC: 8 MG/DL (ref 6–20)
CALCIUM SERPL-MCNC: 9.7 MG/DL (ref 8.7–10.5)
CHLORIDE SERPL-SCNC: 105 MMOL/L (ref 95–110)
CLARITY UR: CLEAR
CO2 SERPL-SCNC: 27 MMOL/L (ref 23–29)
COLOR UR AUTO: YELLOW
CREAT SERPL-MCNC: 0.7 MG/DL (ref 0.5–1.4)
ERYTHROCYTE [DISTWIDTH] IN BLOOD BY AUTOMATED COUNT: 15.7 % (ref 11.5–14.5)
GFR SERPLBLD CREATININE-BSD FMLA CKD-EPI: >60 ML/MIN/1.73/M2
GLUCOSE SERPL-MCNC: 94 MG/DL (ref 70–110)
GLUCOSE UR QL STRIP: NEGATIVE
HCT VFR BLD AUTO: 33.8 % (ref 37–48.5)
HGB BLD-MCNC: 10.7 GM/DL (ref 12–16)
HGB UR QL STRIP: NEGATIVE
IMM GRANULOCYTES # BLD AUTO: 0.02 K/UL (ref 0–0.04)
IMM GRANULOCYTES NFR BLD AUTO: 0.3 % (ref 0–0.5)
KETONES UR QL STRIP: NEGATIVE
LACTATE SERPL-SCNC: 0.7 MMOL/L (ref 0.5–2.2)
LEUKOCYTE ESTERASE UR QL STRIP: NEGATIVE
LIPASE SERPL-CCNC: 17 U/L (ref 4–60)
LYMPHOCYTES # BLD AUTO: 2.13 K/UL (ref 1–4.8)
MCH RBC QN AUTO: 27.6 PG (ref 27–50)
MCHC RBC AUTO-ENTMCNC: 31.7 G/DL (ref 32–36)
MCV RBC AUTO: 87 FL (ref 82–98)
NITRITE UR QL STRIP: NEGATIVE
NUCLEATED RBC (/100WBC) (OHS): 0 /100 WBC
OHS QRS DURATION: 66 MS
OHS QTC CALCULATION: 444 MS
PH UR STRIP: 7 [PH]
PLATELET # BLD AUTO: 385 K/UL (ref 150–450)
PMV BLD AUTO: 9.7 FL (ref 9.2–12.9)
POTASSIUM SERPL-SCNC: 4 MMOL/L (ref 3.5–5.1)
PROT SERPL-MCNC: 8.1 GM/DL (ref 6–8.4)
PROT UR QL STRIP: NEGATIVE
RBC # BLD AUTO: 3.88 M/UL (ref 4–5.4)
RELATIVE EOSINOPHIL (OHS): 2.5 %
RELATIVE LYMPHOCYTE (OHS): 29.3 % (ref 18–48)
RELATIVE MONOCYTE (OHS): 10.2 % (ref 4–15)
RELATIVE NEUTROPHIL (OHS): 57.3 % (ref 38–73)
SODIUM SERPL-SCNC: 142 MMOL/L (ref 136–145)
SP GR UR STRIP: 1.01
TROPONIN I SERPL HS-MCNC: <3 NG/L
UROBILINOGEN UR STRIP-ACNC: NEGATIVE EU/DL
WBC # BLD AUTO: 7.27 K/UL (ref 3.9–12.7)

## 2025-03-27 PROCEDURE — 25000003 PHARM REV CODE 250: Performed by: STUDENT IN AN ORGANIZED HEALTH CARE EDUCATION/TRAINING PROGRAM

## 2025-03-27 PROCEDURE — G0378 HOSPITAL OBSERVATION PER HR: HCPCS

## 2025-03-27 PROCEDURE — 81003 URINALYSIS AUTO W/O SCOPE: CPT

## 2025-03-27 PROCEDURE — 63600175 PHARM REV CODE 636 W HCPCS: Mod: JZ,TB | Performed by: EMERGENCY MEDICINE

## 2025-03-27 PROCEDURE — 96361 HYDRATE IV INFUSION ADD-ON: CPT

## 2025-03-27 PROCEDURE — 96375 TX/PRO/DX INJ NEW DRUG ADDON: CPT

## 2025-03-27 PROCEDURE — 63600175 PHARM REV CODE 636 W HCPCS: Performed by: STUDENT IN AN ORGANIZED HEALTH CARE EDUCATION/TRAINING PROGRAM

## 2025-03-27 PROCEDURE — 84484 ASSAY OF TROPONIN QUANT: CPT

## 2025-03-27 PROCEDURE — 93005 ELECTROCARDIOGRAM TRACING: CPT

## 2025-03-27 PROCEDURE — 96372 THER/PROPH/DIAG INJ SC/IM: CPT | Performed by: EMERGENCY MEDICINE

## 2025-03-27 PROCEDURE — 85025 COMPLETE CBC W/AUTO DIFF WBC: CPT

## 2025-03-27 PROCEDURE — 83690 ASSAY OF LIPASE: CPT

## 2025-03-27 PROCEDURE — 25500020 PHARM REV CODE 255: Performed by: EMERGENCY MEDICINE

## 2025-03-27 PROCEDURE — 83605 ASSAY OF LACTIC ACID: CPT

## 2025-03-27 PROCEDURE — 96376 TX/PRO/DX INJ SAME DRUG ADON: CPT

## 2025-03-27 PROCEDURE — 25000003 PHARM REV CODE 250

## 2025-03-27 PROCEDURE — 96374 THER/PROPH/DIAG INJ IV PUSH: CPT

## 2025-03-27 PROCEDURE — 99285 EMERGENCY DEPT VISIT HI MDM: CPT | Mod: 25

## 2025-03-27 PROCEDURE — 63600175 PHARM REV CODE 636 W HCPCS: Mod: JZ,TB

## 2025-03-27 PROCEDURE — 63600175 PHARM REV CODE 636 W HCPCS

## 2025-03-27 PROCEDURE — 93010 ELECTROCARDIOGRAM REPORT: CPT | Mod: ,,, | Performed by: INTERNAL MEDICINE

## 2025-03-27 PROCEDURE — 99223 1ST HOSP IP/OBS HIGH 75: CPT | Mod: 24,,, | Performed by: SURGERY

## 2025-03-27 PROCEDURE — 82374 ASSAY BLOOD CARBON DIOXIDE: CPT

## 2025-03-27 RX ORDER — GABAPENTIN 300 MG/1
300 CAPSULE ORAL 3 TIMES DAILY
Status: DISCONTINUED | OUTPATIENT
Start: 2025-03-27 | End: 2025-03-31 | Stop reason: HOSPADM

## 2025-03-27 RX ORDER — HYDROMORPHONE HYDROCHLORIDE 1 MG/ML
1 INJECTION, SOLUTION INTRAMUSCULAR; INTRAVENOUS; SUBCUTANEOUS
Refills: 0 | Status: COMPLETED | OUTPATIENT
Start: 2025-03-27 | End: 2025-03-27

## 2025-03-27 RX ORDER — LORAZEPAM 2 MG/ML
0.5 INJECTION INTRAMUSCULAR
Status: COMPLETED | OUTPATIENT
Start: 2025-03-27 | End: 2025-03-27

## 2025-03-27 RX ORDER — METHOCARBAMOL 500 MG/1
500 TABLET, FILM COATED ORAL 3 TIMES DAILY PRN
Status: DISCONTINUED | OUTPATIENT
Start: 2025-03-27 | End: 2025-03-31 | Stop reason: HOSPADM

## 2025-03-27 RX ORDER — PROPRANOLOL HYDROCHLORIDE 20 MG/1
40 TABLET ORAL 2 TIMES DAILY
Status: DISCONTINUED | OUTPATIENT
Start: 2025-03-27 | End: 2025-03-31 | Stop reason: HOSPADM

## 2025-03-27 RX ORDER — OXYCODONE HYDROCHLORIDE 5 MG/1
5 TABLET ORAL EVERY 6 HOURS PRN
Refills: 0 | Status: DISCONTINUED | OUTPATIENT
Start: 2025-03-27 | End: 2025-03-31 | Stop reason: HOSPADM

## 2025-03-27 RX ORDER — ACETAMINOPHEN 325 MG/1
650 TABLET ORAL EVERY 8 HOURS PRN
Status: DISCONTINUED | OUTPATIENT
Start: 2025-03-27 | End: 2025-03-27

## 2025-03-27 RX ORDER — HYDROMORPHONE HYDROCHLORIDE 1 MG/ML
1 INJECTION, SOLUTION INTRAMUSCULAR; INTRAVENOUS; SUBCUTANEOUS
Status: COMPLETED | OUTPATIENT
Start: 2025-03-27 | End: 2025-03-27

## 2025-03-27 RX ORDER — CLONAZEPAM 0.5 MG/1
0.5 TABLET ORAL 2 TIMES DAILY
Status: DISCONTINUED | OUTPATIENT
Start: 2025-03-27 | End: 2025-03-31 | Stop reason: HOSPADM

## 2025-03-27 RX ORDER — HYDROMORPHONE HYDROCHLORIDE 1 MG/ML
1 INJECTION, SOLUTION INTRAMUSCULAR; INTRAVENOUS; SUBCUTANEOUS
Refills: 0 | Status: DISCONTINUED | OUTPATIENT
Start: 2025-03-27 | End: 2025-03-27

## 2025-03-27 RX ORDER — ACETAMINOPHEN 10 MG/ML
1000 INJECTION, SOLUTION INTRAVENOUS EVERY 8 HOURS
Status: COMPLETED | OUTPATIENT
Start: 2025-03-27 | End: 2025-03-28

## 2025-03-27 RX ORDER — TALC
6 POWDER (GRAM) TOPICAL NIGHTLY PRN
Status: DISCONTINUED | OUTPATIENT
Start: 2025-03-27 | End: 2025-03-31 | Stop reason: HOSPADM

## 2025-03-27 RX ORDER — PANTOPRAZOLE SODIUM 40 MG/1
40 TABLET, DELAYED RELEASE ORAL 2 TIMES DAILY
Status: DISCONTINUED | OUTPATIENT
Start: 2025-03-27 | End: 2025-03-31 | Stop reason: HOSPADM

## 2025-03-27 RX ORDER — MIRTAZAPINE 15 MG/1
30 TABLET, ORALLY DISINTEGRATING ORAL NIGHTLY
Status: DISCONTINUED | OUTPATIENT
Start: 2025-03-27 | End: 2025-03-31 | Stop reason: HOSPADM

## 2025-03-27 RX ORDER — SODIUM CHLORIDE, SODIUM LACTATE, POTASSIUM CHLORIDE, CALCIUM CHLORIDE 600; 310; 30; 20 MG/100ML; MG/100ML; MG/100ML; MG/100ML
INJECTION, SOLUTION INTRAVENOUS CONTINUOUS
Status: DISCONTINUED | OUTPATIENT
Start: 2025-03-27 | End: 2025-03-28

## 2025-03-27 RX ORDER — SODIUM CHLORIDE 0.9 % (FLUSH) 0.9 %
10 SYRINGE (ML) INJECTION
Status: DISCONTINUED | OUTPATIENT
Start: 2025-03-27 | End: 2025-03-31 | Stop reason: HOSPADM

## 2025-03-27 RX ORDER — ACETAMINOPHEN 10 MG/ML
1000 INJECTION, SOLUTION INTRAVENOUS EVERY 8 HOURS
Status: DISCONTINUED | OUTPATIENT
Start: 2025-03-28 | End: 2025-03-27

## 2025-03-27 RX ORDER — LIDOCAINE HYDROCHLORIDE 10 MG/ML
1 INJECTION, SOLUTION EPIDURAL; INFILTRATION; INTRACAUDAL; PERINEURAL ONCE AS NEEDED
Status: DISCONTINUED | OUTPATIENT
Start: 2025-03-27 | End: 2025-03-31 | Stop reason: HOSPADM

## 2025-03-27 RX ORDER — SCOPOLAMINE 1 MG/3D
1 PATCH, EXTENDED RELEASE TRANSDERMAL
Status: DISCONTINUED | OUTPATIENT
Start: 2025-03-27 | End: 2025-03-27

## 2025-03-27 RX ORDER — PROCHLORPERAZINE EDISYLATE 5 MG/ML
5 INJECTION INTRAMUSCULAR; INTRAVENOUS EVERY 6 HOURS PRN
Status: DISCONTINUED | OUTPATIENT
Start: 2025-03-27 | End: 2025-03-31 | Stop reason: HOSPADM

## 2025-03-27 RX ORDER — ACETAMINOPHEN 325 MG/1
650 TABLET ORAL EVERY 4 HOURS PRN
Status: DISCONTINUED | OUTPATIENT
Start: 2025-03-27 | End: 2025-03-27

## 2025-03-27 RX ORDER — DIPHENHYDRAMINE HYDROCHLORIDE 50 MG/ML
12.5 INJECTION, SOLUTION INTRAMUSCULAR; INTRAVENOUS
Status: COMPLETED | OUTPATIENT
Start: 2025-03-27 | End: 2025-03-27

## 2025-03-27 RX ADMIN — GABAPENTIN 300 MG: 300 CAPSULE ORAL at 11:03

## 2025-03-27 RX ADMIN — HYDROMORPHONE HYDROCHLORIDE 1 MG: 1 INJECTION, SOLUTION INTRAMUSCULAR; INTRAVENOUS; SUBCUTANEOUS at 11:03

## 2025-03-27 RX ADMIN — IOHEXOL 75 ML: 350 INJECTION, SOLUTION INTRAVENOUS at 12:03

## 2025-03-27 RX ADMIN — DIPHENHYDRAMINE HYDROCHLORIDE 12.5 MG: 50 INJECTION, SOLUTION INTRAMUSCULAR; INTRAVENOUS at 11:03

## 2025-03-27 RX ADMIN — CLONAZEPAM 0.5 MG: 0.5 TABLET ORAL at 09:03

## 2025-03-27 RX ADMIN — SODIUM CHLORIDE, POTASSIUM CHLORIDE, SODIUM LACTATE AND CALCIUM CHLORIDE: 600; 310; 30; 20 INJECTION, SOLUTION INTRAVENOUS at 04:03

## 2025-03-27 RX ADMIN — HYDROMORPHONE HYDROCHLORIDE 1 MG: 1 INJECTION, SOLUTION INTRAMUSCULAR; INTRAVENOUS; SUBCUTANEOUS at 03:03

## 2025-03-27 RX ADMIN — ACETAMINOPHEN 1000 MG: 10 INJECTION, SOLUTION INTRAVENOUS at 11:03

## 2025-03-27 RX ADMIN — SODIUM CHLORIDE 1000 ML: 9 INJECTION, SOLUTION INTRAVENOUS at 11:03

## 2025-03-27 RX ADMIN — HYDROMORPHONE HYDROCHLORIDE 1 MG: 1 INJECTION, SOLUTION INTRAMUSCULAR; INTRAVENOUS; SUBCUTANEOUS at 01:03

## 2025-03-27 RX ADMIN — LORAZEPAM 0.5 MG: 2 INJECTION INTRAMUSCULAR; INTRAVENOUS at 03:03

## 2025-03-27 RX ADMIN — OXYCODONE 5 MG: 5 TABLET ORAL at 07:03

## 2025-03-27 RX ADMIN — MIRTAZAPINE 30 MG: 15 TABLET, ORALLY DISINTEGRATING ORAL at 11:03

## 2025-03-27 RX ADMIN — PANTOPRAZOLE SODIUM 40 MG: 40 TABLET, DELAYED RELEASE ORAL at 09:03

## 2025-03-27 RX ADMIN — PROCHLORPERAZINE EDISYLATE 5 MG: 5 INJECTION INTRAMUSCULAR; INTRAVENOUS at 10:03

## 2025-03-27 NOTE — SUBJECTIVE & OBJECTIVE
(Not in a hospital admission)      Review of patient's allergies indicates:   Allergen Reactions    Azathioprine sodium Other (See Comments)     Other reaction(s): pancreatitis  Other reaction(s): pancreatitis    Methotrexate analogues Other (See Comments)     leukopenia    Stelara [ustekinumab] Other (See Comments)     Multiple infections    Zofran [ondansetron hcl (pf)] Other (See Comments)     Per patient causes prolong QT    Vancomycin analogues Other (See Comments)     Made her red    Azathioprine      Other reaction(s): Unknown    Methotrexate      Other reaction(s): infection-    Morphine Itching and Other (See Comments)     Other reaction(s): Itching    Zofran [ondansetron hcl]      Other reaction(s): Hives    Bactrim [sulfamethoxazole-trimethoprim] Palpitations    Ciprofloxacin Palpitations       Past Medical History:   Diagnosis Date    Abnormal Pap smear 2007    Alkaline phosphatase elevation- based on lab from 4/9/2019 05/28/2019    Arthritis     Avascular necrosis of bone of hip, left     Bacterial vaginosis     Crohn disease     Depression 08/05/2017    Drug-induced pancreatitis (imuran)     Encounter for blood transfusion     Generalized anxiety disorder     Genital HSV     GERD (gastroesophageal reflux disease)     Hypertension     Ileostomy in place 07/09/2012    Kidney stone     Long QT syndrome     Melanoma in situ (excised-buttocks 2018, para-stomal site 2019)     Moderate episode of recurrent major depressive disorder 02/02/2024    Multiple thyroid nodules 11/27/2018    Osteopenia of multiple sites 01/07/2019    Pancreas cyst (tail, 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)     Recurrent Clostridioides difficile diarrhea     Recurrent UTI 04/03/2013     Past Surgical History:   Procedure Laterality Date    ABDOMINAL SURGERY      APPENDECTOMY      ARTHROPLASTY OF SHOULDER Left 7/18/2023    Procedure: ARTHROPLASTY, SHOULDER;  Surgeon: Sharon Babb MD;  Location: [facility];  Service: Orthopedics;  Laterality: Left;     AUGMENTATION OF BREAST Bilateral 06/2022    gel implants    BILATERAL SALPINGO-OOPHORECTOMY (BSO) Bilateral 05/30/2019    Procedure: SALPINGO-OOPHORECTOMY, BILATERAL;  Surgeon: Rupa German MD;  Location: [facility] OR 2ND FLR;  Service: OB/GYN;  Laterality: Bilateral;    BLADDER SURGERY      partial cystectomy due to fistula    breast lift      BREAST SURGERY      [facility]      COLON SURGERY      COLONOSCOPY      CYSTOGRAM  12/11/2024    Procedure: CYSTOGRAM;  Surgeon: Lexi Villalba MD;  Location: [facility] OR;  Service: Urology;;    CYSTOSCOPY  09/23/2020    Procedure: CYSTOSCOPY;  Surgeon: Sascha Florentino MD;  Location: [facility] OR 1ST FLR;  Service: Urology;;    CYSTOSCOPY N/A 12/11/2024    Procedure: CYSTOSCOPY;  Surgeon: Lexi Villalba MD;  Location: [facility] OR;  Service: Urology;  Laterality: N/A;    CYSTOSCOPY W/ URETERAL STENT PLACEMENT Left 09/15/2020    Procedure: CYSTOSCOPY, WITH URETERAL STENT INSERTION;  Surgeon: Sascha Florentino MD;  Location: [facility] OR 1ST FLR;  Service: Urology;  Laterality: Left;    CYSTOSCOPY,WITH URETERAL CATHETER INSERTION Bilateral 3/19/2025    Procedure: CYSTOSCOPY,WITH URETERAL CATHETER INSERTION;  Surgeon: Chris Jones MD;  Location: [facility] OR 2ND FLR;  Service: Urology;  Laterality: Bilateral;    DIAGNOSTIC LAPAROSCOPY N/A 07/09/2020    Procedure: LAPAROSCOPY, DIAGNOSTIC;  Surgeon: Gilson El MD;  Location: [facility] OR;  Service: OB/GYN;  Laterality: N/A;    DIAGNOSTIC LAPAROSCOPY N/A 3/19/2025    Procedure: LAPAROSCOPY, DIAGNOSTIC;  Surgeon: DUNCAN Campbell MD;  Location: [facility] OR 2ND FLR;  Service: Colon and Rectal;  Laterality: N/A;    ESOPHAGOGASTRODUODENOSCOPY N/A 1/3/2024    Procedure: EGD (ESOPHAGOGASTRODUODENOSCOPY);  Surgeon: Lily Lind MD;  Location: [facility] ENDO (2ND FLR);  Service: Endoscopy;  Laterality: N/A;    EXCISION OF MELANOMA  07/17/2019    ILEOSCOPY N/A 1/3/2024    Procedure: ILEOSCOPY;  Surgeon: Lily Lind MD;  Location: [facility]  (2ND FLR);  Service: Endoscopy;  Laterality: N/A;    ILEOSCOPY N/A 1/24/2024    Procedure: ILEOSCOPY;  Surgeon: Lilian Navarro MD;  Location: [facility] ENDO (2ND FLR);  Service: Endoscopy;  Laterality: N/A;  through stoma    ILEOSCOPY N/A 6/4/2024    Procedure: ILEOSCOPY;  Surgeon: Peace Garcia MD;  Location: [facility] ENDO (4TH FLR);  Service: Endoscopy;  Laterality: N/A;  Ref By:,instr sent via portal,  received urgent message to reschedule pt from 7/15  to may or june-updated prep instructions sent-   5/29/24- precall complete - ERW    ILEOSCOPY N/A 12/2/2024    Procedure: ILEOSCOPY;  Surgeon: Peace Garcia MD;  Location: [facility] ENDO (4TH FLR);  Service: Endoscopy;  Laterality: N/A;  Ref By:,Carroll,half miralax.  11/22 lvm for precall-st  11/27/24 attempted precall no answer LVM   11/29-LVM for pre call.  No answe.-JM/ES  11/29-pt lvm confirming appt-KPvt    ILEOSTOMY      INSERTION OF TUNNELED CENTRAL VENOUS CATHETER (CVC) WITH SUBCUTANEOUS PORT Right 9/10/2024    Procedure: INSERTION, SINGLE LUMEN CATHETER WITH PORT, WITH FLUOROSCOPIC GUIDANCE, Rt neck or chest, prefers chest;  Surgeon: Vinh Lloyd MD;  [facility];  Service: General;  Laterality: Right;    LAPAROSCOPIC LYSIS OF ADHESIONS N/A 07/09/2020    Procedure: LYSIS, ADHESIONS, LAPAROSCOPIC;  Surgeon: Gilson El MD;  Location: [facility] OR;  Service: OB/GYN;  Laterality: N/A;    LAPAROSCOPIC LYSIS OF ADHESIONS N/A 3/19/2025    Procedure: LYSIS, ADHESIONS, LAPAROSCOPIC;  Surgeon: DUNCAN Campbell MD;  Location: [facility] OR 2ND FLR;  Service: Colon and Rectal;  Laterality: N/A;    LAPAROSCOPIC RESECTION OF SMALL INTESTINE N/A 3/19/2025    Procedure: EXCISION, SMALL INTESTINE, LAPAROSCOPIC, ERAS low;  Surgeon: DUNCAN Campbell MD;  Location: [facility] OR 2ND FLR;  Service: Colon and Rectal;  Laterality: N/A;    LASER LITHOTRIPSY  09/23/2020    Procedure: LITHOTRIPSY, USING LASER;  Surgeon: Sascha Florentino,  MD;  Location: [facility] OR 1ST FLR;  Service: Urology;;    LIVER BIOPSY N/A 3/19/2025    Procedure: BIOPSY, LIVER;  Surgeon: DUNCAN Campbell MD;  Location: [facility] OR 2ND FLR;  Service: Colon and Rectal;  Laterality: N/A;    LYSIS OF ADHESIONS N/A 05/30/2019    Procedure: LYSIS, ADHESIONS;  Surgeon: Rupa German MD;  [facility];  Service: OB/GYN;  Laterality: N/A;    MEDIPORT REMOVAL Left 9/10/2024    Procedure: REMOVAL, CATHETER, CENTRAL VENOUS, TUNNELED, WITH PORT, Left side;  Surgeon: Vinh Lloyd MD;  [facility];  Service: General;  Laterality: Left;    OOPHORECTOMY Right 04/16/2015    PORTACATH PLACEMENT  02/21/2017    SKIN BIOPSY      SMALL INTESTINE SURGERY      age 18 Y    TOTAL ABDOMINAL HYSTERECTOMY  04/16/2015    TOTAL COLECTOMY      TUBAL LIGATION  06/06/2012    UPPER GASTROINTESTINAL ENDOSCOPY      URETEROSCOPIC REMOVAL OF URETERIC CALCULUS  09/23/2020    Procedure: REMOVAL, CALCULUS, URETER, URETEROSCOPIC;  Surgeon: Sascha Florentino MD;  Location: [facility] OR [address] [place] [address];  Service: Urology;;    URETEROSCOPY Left 09/23/2020    Procedure: URETEROSCOPY;  Surgeon: Sascha Florentino MD;  Location: [facility] OR [address] [place] [address];  Service: Urology;  Laterality: Left;     Family History       Problem Relation (Age of Onset)    Breast cancer Maternal Cousin (41)    Cancer Maternal Grandfather, Father    Colon cancer Father    Crohn's disease Brother, Daughter    Diabetes Paternal Grandfather, Maternal Grandfather    Endometrial cancer Maternal Aunt    Hearing loss Paternal Grandmother    Heart disease Maternal Grandfather    Hyperlipidemia Father    Hypertension Mother    Liver cancer Father    Skin cancer Maternal Grandfather          Tobacco Use    Smoking status: Never     Passive exposure: Past    Smokeless tobacco: Never   Substance and Sexual Activity    Alcohol use: Not Currently     Alcohol/week: 0.0 standard drinks of alcohol    Drug use: No    Sexual activity: Yes     Partners:  Male     Birth control/protection: See Surgical Hx     Comment: HYST     Review of Systems   Constitutional:  Positive for activity change. Negative for chills, fatigue, fever and unexpected weight change.   HENT:  Negative for congestion, sinus pressure, sinus pain and sore throat.    Eyes:  Negative for visual disturbance.   Respiratory:  Negative for cough, chest tightness and shortness of breath.    Cardiovascular:  Negative for chest pain and palpitations.   Gastrointestinal:  Positive for abdominal pain and nausea. Negative for constipation, diarrhea and vomiting.   Genitourinary:  Negative for difficulty urinating, flank pain and urgency.   Musculoskeletal:  Negative for arthralgias, back pain and neck pain.   Neurological:  Positive for weakness. Negative for dizziness, light-headedness and headaches.     Objective:     Vital Signs (Most Recent):  Temp: 98.7 °F (37.1 °C) (03/27/25 1245)  Pulse: 61 (03/27/25 1600)  Resp: 18 (03/27/25 1600)  BP: 123/71 (03/27/25 1600)  SpO2: 95 % (03/27/25 1600) Vital Signs (24h Range):  Temp:  [98.7 °F (37.1 °C)-99 °F (37.2 °C)] 98.7 °F (37.1 °C)  Pulse:  [61-81] 61  Resp:  [17-20] 18  SpO2:  [95 %-100 %] 95 %  BP: ()/(57-74) 123/71     Weight: 54.4 kg (119 lb 14.9 oz)  Body mass index is 22.66 kg/m².     Physical Exam  Constitutional:       General: She is not in acute distress.  HENT:      Head: Normocephalic and atraumatic.   Eyes:      Extraocular Movements: Extraocular movements intact.      Conjunctiva/sclera: Conjunctivae normal.      Pupils: Pupils are equal, round, and reactive to light.   Cardiovascular:      Rate and Rhythm: Normal rate and regular rhythm.   Pulmonary:      Effort: Pulmonary effort is normal. No respiratory distress.   Abdominal:      Palpations: Abdomen is soft.      Comments: Mild distension  Generalized mild tenderness to palpation without and rebound or guarding   Incisions c/d/I    Neurological:      General: No focal deficit present.       Mental Status: She is alert.            Significant Labs:  BMP:   Recent Labs   Lab 03/27/25  1140      K 4.0      CO2 27   BUN 8   CREATININE 0.7   CALCIUM 9.7     CBC:   Recent Labs   Lab 03/27/25  1140   WBC 7.27   RBC 3.88*   HGB 10.7*   HCT 33.8*      MCV 87   MCH 27.6   MCHC 31.7*       Significant Diagnostics:  I have reviewed all pertinent imaging results/findings within the past 24 hours.

## 2025-03-27 NOTE — ED NOTES
RN attempted to access port x 2 with no success.  MD and Charge RN notified. Second RN attempted x 1 with no success.  MD currently at bedside to evaluate.

## 2025-03-27 NOTE — ED NOTES
Pt. Undressed and placed in hospital gown.  Cardiac monitor, BP Cuff, and Oxygen Sensor applied to finger.  Pillow placed for comfort.  Blankets applied.

## 2025-03-27 NOTE — HPI
Patient is a 44 y.o. 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 Parsons with recurrent admissions for SBO now s/p recent exploratory laparotomy with JUAN and SBR on 3/19/2025 by Dr. Campbell. Patient presented to the ED today with complaints of abdominal pain and nausea. States that she initially was doing very well post-operatively and tolerating diet without issue. About two days ago started feeling unwell and developing worsening nausea and abdominal pain. Had an episode of emesis today. Ostomy still with output but has been decreased. States she has not eaten or drank much over these last two days. Patient AF and VSS on arrival to the ED. Labs largely wnl. CT with some small bowel dilation and narrowing at anastomosis.

## 2025-03-27 NOTE — ED NOTES
Assumed care of the patient. Report received from MIKE Mejia. continuous pulse oximetry, and automatic BP cuff cycling Q15min. Pt in hospital gown, side rails up X2, bed low and locked, and call light is placed within reach. One family/visitors at bedside at this time. Pt denies any complaints or needs.     Ivy Salgado, a 44 y.o. female presents to the ED w/ complaint of multiple complaints     Triage note:  Chief Complaint   Patient presents with    Post-op Problem     Bowel surgery on 3/19  Increased generalized pain onset yesterday    Nausea    Vomiting     Review of patient's allergies indicates:   Allergen Reactions    Azathioprine sodium Other (See Comments)     Other reaction(s): pancreatitis  Other reaction(s): pancreatitis    Methotrexate analogues Other (See Comments)     leukopenia    Stelara [ustekinumab] Other (See Comments)     Multiple infections    Zofran [ondansetron hcl (pf)] Other (See Comments)     Per patient causes prolong QT    Vancomycin analogues Other (See Comments)     Made her red    Azathioprine      Other reaction(s): Unknown    Methotrexate      Other reaction(s): infection-    Morphine Itching and Other (See Comments)     Other reaction(s): Itching    Zofran [ondansetron hcl]      Other reaction(s): Hives    Bactrim [sulfamethoxazole-trimethoprim] Palpitations    Ciprofloxacin Palpitations     Past Medical History:   Diagnosis Date    Abnormal Pap smear 2007    Alkaline phosphatase elevation- based on lab from 4/9/2019 05/28/2019    Arthritis     Avascular necrosis of bone of hip, left     Bacterial vaginosis     Crohn disease     Depression 08/05/2017    Drug-induced pancreatitis (imuran)     Encounter for blood transfusion     Generalized anxiety disorder     Genital HSV     GERD (gastroesophageal reflux disease)     Hypertension     Ileostomy in place 07/09/2012    Kidney stone     Long QT syndrome     Melanoma in situ (excised-buttocks 2018, para-stomal site 2019)      Moderate episode of recurrent major depressive disorder 02/02/2024    Multiple thyroid nodules 11/27/2018    Osteopenia of multiple sites 01/07/2019    Pancreas cyst (tail, 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)     Recurrent Clostridioides difficile diarrhea     Recurrent UTI 04/03/2013

## 2025-03-27 NOTE — ED PROVIDER NOTES
Encounter Date: 3/27/2025       History     Chief Complaint   Patient presents with    Post-op Problem     Bowel surgery on 3/19  Increased generalized pain onset yesterday    Nausea    Vomiting     Patient is a 44-year-old with history of Crohn's disease, colectomy with ileostomy placement and recent lysis of adhesion surgery 8 days ago presenting due to abdominal pain and vomiting starting yesterday.  Patient states that since her surgery everything has been going well.  She has been able to eat and has had good ostomy output.  Starting yesterday she started noticed that she was having significant abdominal pain in her upper quadrants and has been pain around her surgical scar.  She has had multiple episodes of vomiting.  She takes Phenergan but has not helped at all.  She could not eat yesterday in mainly consumed ice chips.  Denies fevers, body aches, chills, shortness of breath.  Endorses some light chest pain and heartburn.        Review of patient's allergies indicates:   Allergen Reactions    Azathioprine sodium Other (See Comments)     Other reaction(s): pancreatitis  Other reaction(s): pancreatitis    Methotrexate analogues Other (See Comments)     leukopenia    Stelara [ustekinumab] Other (See Comments)     Multiple infections    Zofran [ondansetron hcl (pf)] Other (See Comments)     Per patient causes prolong QT    Vancomycin analogues Other (See Comments)     Made her red    Azathioprine      Other reaction(s): Unknown    Methotrexate      Other reaction(s): infection-    Morphine Itching and Other (See Comments)     Other reaction(s): Itching    Zofran [ondansetron hcl]      Other reaction(s): Hives    Bactrim [sulfamethoxazole-trimethoprim] Palpitations    Ciprofloxacin Palpitations     Past Medical History:   Diagnosis Date    Abnormal Pap smear 2007    Alkaline phosphatase elevation- based on lab from 4/9/2019 05/28/2019    Arthritis     Avascular necrosis of bone of hip, left     Bacterial  vaginosis     Crohn disease     Depression 08/05/2017    Drug-induced pancreatitis (imuran)     Encounter for blood transfusion     Generalized anxiety disorder     Genital HSV     GERD (gastroesophageal reflux disease)     Hypertension     Ileostomy in place 07/09/2012    Kidney stone     Long QT syndrome     Melanoma in situ (excised-buttocks 2018, para-stomal site 2019)     Moderate episode of recurrent major depressive disorder 02/02/2024    Multiple thyroid nodules 11/27/2018    Osteopenia of multiple sites 01/07/2019    Pancreas cyst (tail, 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)     Recurrent Clostridioides difficile diarrhea     Recurrent UTI 04/03/2013     Past Surgical History:   Procedure Laterality Date    ABDOMINAL SURGERY      APPENDECTOMY      ARTHROPLASTY OF SHOULDER Left 7/18/2023    Procedure: ARTHROPLASTY, SHOULDER;  Surgeon: Sharon Babb MD;  Location: [facility] OR;  Service: Orthopedics;  Laterality: Left;    AUGMENTATION OF BREAST Bilateral 06/2022    gel implants    BILATERAL SALPINGO-OOPHORECTOMY (BSO) Bilateral 05/30/2019    Procedure: SALPINGO-OOPHORECTOMY, BILATERAL;  Surgeon: Rupa German MD;  Location: [facility] OR [address] [place] [address];  Service: OB/GYN;  Laterality: Bilateral;    BLADDER SURGERY      partial cystectomy due to fistula    breast lift      BREAST SURGERY      [facility]      COLON SURGERY      COLONOSCOPY      CYSTOGRAM  12/11/2024    Procedure: CYSTOGRAM;  Surgeon: Lexi Villalba MD;  Location: [facility] OR;  Service: Urology;;    CYSTOSCOPY  09/23/2020    Procedure: CYSTOSCOPY;  Surgeon: aSscha Florentino MD;  [facility];  Service: Urology;;    CYSTOSCOPY N/A 12/11/2024    Procedure: CYSTOSCOPY;  Surgeon: Lexi Villalba MD;  Location: [facility] OR;  Service: Urology;  Laterality: N/A;    CYSTOSCOPY W/ URETERAL STENT PLACEMENT Left 09/15/2020    Procedure: CYSTOSCOPY, WITH URETERAL STENT INSERTION;  Surgeon: Sascha Florentino MD;  Location: [facility] OR [address] [place] [address];  Service: Urology;  Laterality: Left;     CYSTOSCOPY,WITH URETERAL CATHETER INSERTION Bilateral 3/19/2025    Procedure: CYSTOSCOPY,WITH URETERAL CATHETER INSERTION;  Surgeon: Chris Jones MD;  Location: [facility] OR 2ND FLR;  Service: Urology;  Laterality: Bilateral;    DIAGNOSTIC LAPAROSCOPY N/A 07/09/2020    Procedure: LAPAROSCOPY, DIAGNOSTIC;  Surgeon: Gilson El MD;  Location: [facility] OR;  Service: OB/GYN;  Laterality: N/A;    DIAGNOSTIC LAPAROSCOPY N/A 3/19/2025    Procedure: LAPAROSCOPY, DIAGNOSTIC;  Surgeon: DUNCAN Campbell MD;  Location: [facility] OR 2ND FLR;  Service: Colon and Rectal;  Laterality: N/A;    ESOPHAGOGASTRODUODENOSCOPY N/A 1/3/2024    Procedure: EGD (ESOPHAGOGASTRODUODENOSCOPY);  Surgeon: Lily Lind MD;  Location: [facility] ENDO (2ND FLR);  Service: Endoscopy;  Laterality: N/A;    EXCISION OF MELANOMA  07/17/2019    ILEOSCOPY N/A 1/3/2024    Procedure: ILEOSCOPY;  Surgeon: Lily Lind MD;  Location: [facility] ENDO (2ND FLR);  Service: Endoscopy;  Laterality: N/A;    ILEOSCOPY N/A 1/24/2024    Procedure: ILEOSCOPY;  Surgeon: Lilian Navarro MD;  Location: [facility] ENDO (2ND FLR);  Service: Endoscopy;  Laterality: N/A;  through stoma    ILEOSCOPY N/A 6/4/2024    Procedure: ILEOSCOPY;  Surgeon: Peace Garcia MD;  Location: [facility] ENDO (4TH FLR);  Service: Endoscopy;  Laterality: N/A;  Ref By:,kaleb sent via [place],  received urgent message to reschedule pt from 7/15  to may or june-updated prep instructions sent-   5/29/24- precall complete - ERW    ILEOSCOPY N/A 12/2/2024    Procedure: ILEOSCOPY;  Surgeon: Peace Garcia MD;  Location: [facility] ENDO (4TH FLR);  Service: Endoscopy;  Laterality: N/A;  Ref By:,lucy,half miralax.  11/22 lvm for precall-st  11/27/24 attempted precall no answer LVM MARI  11/29-LVM for pre call.  No answe.-RIKKI/CANDIDO  11/29-pt lvm confirming appt-KPvt    ILEOSTOMY      INSERTION OF TUNNELED CENTRAL VENOUS CATHETER (CVC) WITH SUBCUTANEOUS PORT Right 9/10/2024    Procedure: INSERTION,  SINGLE LUMEN CATHETER WITH PORT, WITH FLUOROSCOPIC GUIDANCE, Rt neck or chest, prefers chest;  Surgeon: Vinh Lloyd MD;  Location: [facility] OR 2ND FLR;  Service: General;  Laterality: Right;    LAPAROSCOPIC LYSIS OF ADHESIONS N/A 07/09/2020    Procedure: LYSIS, ADHESIONS, LAPAROSCOPIC;  Surgeon: Gilson El MD;  Location: [facility] OR;  Service: OB/GYN;  Laterality: N/A;    LAPAROSCOPIC LYSIS OF ADHESIONS N/A 3/19/2025    Procedure: LYSIS, ADHESIONS, LAPAROSCOPIC;  Surgeon: DUNCAN Campbell MD;  Location: [facility] OR 2ND FLR;  Service: Colon and Rectal;  Laterality: N/A;    LAPAROSCOPIC RESECTION OF SMALL INTESTINE N/A 3/19/2025    Procedure: EXCISION, SMALL INTESTINE, LAPAROSCOPIC, ERAS low;  Surgeon: DUNCAN Campbell MD;  Location: [facility] OR 2ND FLR;  Service: Colon and Rectal;  Laterality: N/A;    LASER LITHOTRIPSY  09/23/2020    Procedure: LITHOTRIPSY, USING LASER;  Surgeon: Ssacha Florentino MD;  Location: [facility] OR 1ST FLR;  Service: Urology;;    LIVER BIOPSY N/A 3/19/2025    Procedure: BIOPSY, LIVER;  Surgeon: DUNCAN Campbell MD;  Location: [facility] OR 2ND FLR;  Service: Colon and Rectal;  Laterality: N/A;    LYSIS OF ADHESIONS N/A 05/30/2019    Procedure: LYSIS, ADHESIONS;  Surgeon: Rupa German MD;  Location: [facility] OR 2ND FLR;  Service: OB/GYN;  Laterality: N/A;    MEDIPORT REMOVAL Left 9/10/2024    Procedure: REMOVAL, CATHETER, CENTRAL VENOUS, TUNNELED, WITH PORT, Left side;  Surgeon: Vinh Lloyd MD;  Location: [facility] OR 2ND FLR;  Service: General;  Laterality: Left;    OOPHORECTOMY Right 04/16/2015    PORTACATH PLACEMENT  02/21/2017    SKIN BIOPSY      SMALL INTESTINE SURGERY      age 18 Y    TOTAL ABDOMINAL HYSTERECTOMY  04/16/2015    TOTAL COLECTOMY      TUBAL LIGATION  06/06/2012    UPPER GASTROINTESTINAL ENDOSCOPY      URETEROSCOPIC REMOVAL OF URETERIC CALCULUS  09/23/2020    Procedure: REMOVAL, CALCULUS, URETER, URETEROSCOPIC;  Surgeon: Sascha Florentino MD;  Location: [facility] OR 1ST FLR;   Service: Urology;;    URETEROSCOPY Left 09/23/2020    Procedure: URETEROSCOPY;  Surgeon: Sascha Florentino MD;  Location: [facility] OR [address] [place] [address];  Service: Urology;  Laterality: Left;     Family History   Problem Relation Name Age of Onset    Diabetes Paternal Grandfather Stephen     Hearing loss Paternal Grandmother Viviane     Cancer Maternal Grandfather Philippe         Skin    Skin cancer Maternal Grandfather Phliippe     Diabetes Maternal Grandfather Philippe     Heart disease Maternal Grandfather Philipep     Colon cancer Father Milan     Cancer Father Milan         Colon    Liver cancer Father Milan     Hyperlipidemia Father Milan     Hypertension Mother Shaila     Crohn's disease Brother      Endometrial cancer Maternal Aunt      Breast cancer Maternal Cousin  41    Crohn's disease Daughter      Ovarian cancer Neg Hx      Melanoma Neg Hx       Social History[1]  Review of Systems  Per HPI  Physical Exam     Initial Vitals [03/27/25 0735]   BP Pulse Resp Temp SpO2   117/69 79 18 99 °F (37.2 °C) 100 %      MAP       --         Physical Exam    Constitutional: She appears well-developed and well-nourished. She is not diaphoretic. She appears distressed.   Eyes: Conjunctivae are normal. Right eye exhibits no discharge. Left eye exhibits no discharge.   Cardiovascular:  Normal rate, regular rhythm and normal heart sounds.           No murmur heard.  Pulmonary/Chest: Breath sounds normal. No stridor. No respiratory distress. She has no wheezes. She has no rhonchi. She has no rales.   Abdominal: Abdomen is soft. She exhibits mass. She exhibits no distension. There is abdominal tenderness.   Significant tenderness to palpation of the superior portion of surgical scar.  No significant discharge, erythema, swelling.  No dehiscence appreciated.  Tenderness to palpation of right upper and left upper quadrants.  No distention.  Ostomy bag is empty. There is no rebound and no guarding.     Neurological: She is alert. GCS  score is 15. GCS eye subscore is 4. GCS verbal subscore is 5. GCS motor subscore is 6.   Skin: Skin is warm and dry.   Psychiatric: She has a normal mood and affect.         ED Course   Procedures  Labs Reviewed   COMPREHENSIVE METABOLIC PANEL - Abnormal       Result Value    Sodium 142      Potassium 4.0      Chloride 105      CO2 27      Glucose 94      BUN 8      Creatinine 0.7      Calcium 9.7      Protein Total 8.1      Albumin 3.2 (*)     Bilirubin Total 0.3       (*)     AST 24      ALT 29      Anion Gap 10      eGFR >60     CBC WITH DIFFERENTIAL - Abnormal    WBC 7.27      RBC 3.88 (*)     HGB 10.7 (*)     HCT 33.8 (*)     MCV 87      MCH 27.6      MCHC 31.7 (*)     RDW 15.7 (*)     Platelet Count 385      MPV 9.7      Nucleated RBC 0      Neut % 57.3      Lymph % 29.3      Mono % 10.2      Eos % 2.5      Basophil % 0.4      Imm Grans % 0.3      Neut # 4.17      Lymph # 2.13      Mono # 0.74      Eos # 0.18      Baso # 0.03      Imm Grans # 0.02     TROPONIN I HIGH SENSITIVITY - Normal    Troponin High Sensitive <3     URINALYSIS, REFLEX TO URINE CULTURE - Normal    Color, UA Yellow      Appearance, UA Clear      pH, UA 7.0      Spec Grav UA 1.010      Protein, UA Negative      Glucose, UA Negative      Ketones, UA Negative      Bilirubin, UA Negative      Blood, UA Negative      Nitrites, UA Negative      Urobilinogen, UA Negative      Leukocyte Esterase, UA Negative     LIPASE - Normal    Lipase Level 17     LACTIC ACID, PLASMA - Normal    Lactic Acid Level 0.7      Narrative:     Falsely low lactic acid results can be found in samples containing >=13.0 mg/dL total bilirubin and/or >=3.5 mg/dL direct bilirubin.    CBC W/ AUTO DIFFERENTIAL    Narrative:     The following orders were created for panel order CBC auto differential.  Procedure                               Abnormality         Status                     ---------                               -----------         ------                      CBC with Differential[[id number]]       Abnormal            Final result                 Please view results for these tests on the individual orders.     EKG Readings: (Independently Interpreted)   No significant ST elevations or ST depressions.  Normal axis.  Normal DE interval.  Normal sinus rhythm.     ECG Results              EKG 12-lead (Final result)        Collection Time Result Time QRS Duration OHS QTC Calculation    03/27/25 08:32:12 03/27/25 09:25:08 66 444                     Final result by Interface, Lab In [facility] (03/27/25 09:25:11)                   Narrative:    Test Reason : R07.9,    Vent. Rate :  74 BPM     Atrial Rate :  74 BPM     P-R Int : 120 ms          QRS Dur :  66 ms      QT Int : 400 ms       P-R-T Axes :  67  62  48 degrees    QTcB Int : 444 ms    Normal sinus rhythm  Normal ECG  When compared with ECG of 27-Jan-2025 08:07,  No significant change was found  Confirmed by Matheiu Haywood ([id number]) on 3/27/2025 9:25:02 AM    Referred By: AAAREFERRAL SELF           Confirmed By: Mathieu Haywood                                  Imaging Results              CT Abdomen Pelvis With IV Contrast NO Oral Contrast (Final result)  Result time 03/27/25 13:48:07      Final result by Hay Garcia MD (03/27/25 13:48:07)                   Impression:      History of Crohn's disease status post total proctocolectomy, right lower quadrant end ileostomy, and recent partial small bowel resection.    Dilated small bowel loops with a transition point in the right lower quadrant near the anastomosis and distal decompression, concerning for small bowel obstruction.    Mesenteric fat stranding and trace ascites.  New 0.8 cm subcapsular hypodensity in the left hepatic lobe, which could represent a hematoma, abscess, or exudative ascites.    Punctate nonobstructing renal stones bilaterally.    Osteonecrosis in the left femoral head.      Electronically signed by: Hay Garcia  Date:    03/27/2025  Time:    13:48                Narrative:    EXAMINATION:  CT ABDOMEN PELVIS WITH IV CONTRAST    CLINICAL HISTORY:  Abdominal pain, acute, nonlocalized;intra-abdominal surgery for removal of adhesions 8 days ago. significant abdominal pain/vomiting, decreased ostomy output starting yesterday;    TECHNIQUE:  Low dose axial images, sagittal and coronal reformations were obtained from the lung bases to the pubic symphysis following the IV administration of 75 mL of Omnipaque 350 .  Oral contrast was not given.    COMPARISON:  CT abdomen pelvis 01/27/2025    FINDINGS:  LUNG BASES: Unremarkable.    HEPATOBILIARY: New 0.8 cm subcapsular hypodensity in segment 4B (2:71).  Gallbladder is decompressed.  No calcified gallstones.  No bile duct dilatation.    SPLEEN: No splenomegaly.    PANCREAS: No focal masses or ductal dilatation.    ADRENALS: No adrenal nodules.    KIDNEYS/URETERS: Punctate nonobstructing renal stones bilaterally.  No hydronephrosis.  Right renal cyst.  Subcentimeter hypodensities in both kidneys, too small to characterize but unchanged.  Small regions of cortical scarring in both kidneys.    BLADDER/PELVIC ORGANS: Bladder is unremarkable.  Hysterectomy and BSO.    PERITONEUM / RETROPERITONEUM: Mesenteric fat stranding and trace intraperitoneal free fluid.  No organized collection.  No free air.  There is a 2.2 cm fat containing lesion in the posterior pelvis, likely fat necrosis.    LYMPH NODES: No lymphadenopathy.    VESSELS: Portal veins are patent.    GI TRACT: Postoperative changes from total proctocolectomy, right lower quadrant end ileostomy, and recent partial small bowel resection.  There are dilated fluid-filled loops of small bowel measuring up to 3.5 cm, with a transition point in the right lower quadrant near the anastomosis (2:122).  Distal small bowel loops are decompressed.    BONES AND SOFT TISSUES: Postoperative changes in the abdominal wall.  Partially imaged breast implants bilaterally.  Subcutaneous  calcifications in the gluteal regions bilaterally.  No sacroiliitis.  Small region of osteonecrosis in the left femoral head.  No surface collapse.  No fracture.                                       X-Ray Chest AP Portable (Final result)  Result time 03/27/25 09:37:53      Final result by Gavino Baca MD (03/27/25 09:37:53)                   Impression:      No significant intrathoracic abnormality.  No significant detrimental interval change in the appearance of the chest since 09/10/2024 is appreciated.      Electronically signed by: Gavino Baac MD  Date:    03/27/2025  Time:    09:37               Narrative:    EXAMINATION:  XR CHEST AP PORTABLE    TECHNIQUE:  One view    COMPARISON:  Comparison is made to 09/10/2024.  Clinical information of chest pain is obtained from the electronic medical record.    FINDINGS:  Vascular access catheter has its tip in the superior vena cava.  Heart size is normal, as is the appearance of the pulmonary vascularity.  Lung zones are clear, and are free of significant airspace consolidation or volume loss.  No pleural fluid.  No abnormal mediastinal widening.  No pneumothorax.  Incidental observations include postoperative changes of a prior left shoulder arthroplasty, bilateral breast implants, and a minor deformity relating to an old healed fracture of the mid portion of the left clavicle.                                    X-Rays:   Independently Interpreted Readings:   Other Readings:  No significant focal opacities.  No blunting of costophrenic angles.  No enlargement of the cardiac mediastinal silhouette.  No bony abnormalities.  Patient has intrathoracic port placed.    Medications   HYDROmorphone injection 1 mg (has no administration in time range)   LORazepam injection 0.5 mg (has no administration in time range)   sodium chloride 0.9% bolus 1,000 mL 1,000 mL (0 mLs Intravenous Stopped 3/27/25 1326)   diphenhydrAMINE injection 12.5 mg (12.5 mg Intravenous Given 3/27/25  1146)   HYDROmorphone injection 1 mg (1 mg Intramuscular Given 3/27/25 1136)   iohexoL (OMNIPAQUE 350) injection 75 mL (75 mLs Intravenous Given 3/27/25 1239)   HYDROmorphone injection 1 mg (1 mg Intravenous Given 3/27/25 1346)     Medical Decision Making  Ivy Salgado is a 44 y.o. female with a past medical history of crohn's disease s/p ileostomy and recent abdominal surgery for adhesion removal presenting to the ED with abdominal pain, decreased ostomy output and N/V.     Differential diagnosis for this patient includes, but is not limited to:  Intra-abdominal abscess, SBO, bowel perf, pancreatitis, ACS    Results of workup include hemoglobin stable.  No leukocytosis.  Creatinine at baseline, electrolytes unremarkable.  Lipase WNL.  The troponin WNL.  EKG unremarkable.  Chest x-ray unremarkable.  Low concern for pancreatitis, ACS.  CT imaging showing signs of SBO and fluid collection concerning for possible abscess VS hematoma.  Patient is signed out to oncoming ED team pending General surgery evaluation.  Questions answered.        Amount and/or Complexity of Data Reviewed  Labs: ordered. Decision-making details documented in ED Course.  Radiology: ordered.    Risk  Prescription drug management.               ED Course as of 03/27/25 1516   Thu Mar 27, 2025   1233 WBC: 7.27 [MB]   1233 Hemoglobin(!): 10.7 [MB]   1233 WBC: 7.27 [MB]   1233 Hemoglobin(!): 10.7 [MB]   1233 Creatinine: 0.7 [MB]      ED Course User Index  [MB] Mathieu Horton MD                           Clinical Impression:  Final diagnoses:  [R07.9] Chest pain  [R10.9] Abdominal pain, unspecified abdominal location (Primary)                     [1]   Social History  Tobacco Use    Smoking status: Never     Passive exposure: Past    Smokeless tobacco: Never   Substance Use Topics    Alcohol use: Not Currently     Alcohol/week: 0.0 standard drinks of alcohol    Drug use: No        Mathieu Horton MD  Resident  03/27/25 1516

## 2025-03-27 NOTE — CONSULTS
[facility] Emergency Dept  Colorectal Surgery  Consult Note    Patient Name: Ivy Salgado  MRN: [id number]  Admission Date: 3/27/2025  Hospital Length of Stay: 0 days  Attending Physician: DUNCAN Campbell MD  Primary Care Provider: Luis Madden,     Inpatient consult to Colorectal Surgery  Consult performed by: Sallie Martinez MD  Consult ordered by: Jolene Wharton MD        Subjective:     Chief Complaint/Reason for Admission: nausea/abdominal pain    History of Present Illness:  Patient is a 44 y.o. 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 Parsons with recurrent admissions for SBO now s/p recent exploratory laparotomy with JUAN and SBR on 3/19/2025 by Dr. Campbell. Patient presented to the ED today with complaints of abdominal pain and nausea. States that she initially was doing very well post-operatively and tolerating diet without issue. About two days ago started feeling unwell and developing worsening nausea and abdominal pain. Had an episode of emesis today. Ostomy still with output but has been decreased. States she has not eaten or drank much over these last two days. Patient AF and VSS on arrival to the ED. Labs largely wnl. CT with some small bowel dilation and narrowing at anastomosis.     (Not in a hospital admission)      Review of patient's allergies indicates:   Allergen Reactions    Azathioprine sodium Other (See Comments)     Other reaction(s): pancreatitis  Other reaction(s): pancreatitis    Methotrexate analogues Other (See Comments)     leukopenia    Stelara [ustekinumab] Other (See Comments)     Multiple infections    Zofran [ondansetron hcl (pf)] Other (See Comments)     Per patient causes prolong QT    Vancomycin analogues Other (See Comments)     Made her red    Azathioprine      Other reaction(s): Unknown    Methotrexate      Other reaction(s): infection-    Morphine Itching and Other (See Comments)      Other reaction(s): Itching    Zofran [ondansetron hcl]      Other reaction(s): Hives    Bactrim [sulfamethoxazole-trimethoprim] Palpitations    Ciprofloxacin Palpitations       Past Medical History:   Diagnosis Date    Abnormal Pap smear 2007    Alkaline phosphatase elevation- based on lab from 4/9/2019 05/28/2019    Arthritis     Avascular necrosis of bone of hip, left     Bacterial vaginosis     Crohn disease     Depression 08/05/2017    Drug-induced pancreatitis (imuran)     Encounter for blood transfusion     Generalized anxiety disorder     Genital HSV     GERD (gastroesophageal reflux disease)     Hypertension     Ileostomy in place 07/09/2012    Kidney stone     Long QT syndrome     Melanoma in situ (excised-buttocks 2018, para-stomal site 2019)     Moderate episode of recurrent major depressive disorder 02/02/2024    Multiple thyroid nodules 11/27/2018    Osteopenia of multiple sites 01/07/2019    Pancreas cyst (tail, 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)     Recurrent Clostridioides difficile diarrhea     Recurrent UTI 04/03/2013     Past Surgical History:   Procedure Laterality Date    ABDOMINAL SURGERY      APPENDECTOMY      ARTHROPLASTY OF SHOULDER Left 7/18/2023    Procedure: ARTHROPLASTY, SHOULDER;  Surgeon: Sharon Babb MD;  Location: [facility] OR;  Service: Orthopedics;  Laterality: Left;    AUGMENTATION OF BREAST Bilateral 06/2022    gel implants    BILATERAL SALPINGO-OOPHORECTOMY (BSO) Bilateral 05/30/2019    Procedure: SALPINGO-OOPHORECTOMY, BILATERAL;  Surgeon: Rupa German MD;  Location: [address];  Service: OB/GYN;  Laterality: Bilateral;    BLADDER SURGERY      partial cystectomy due to fistula    breast lift      BREAST SURGERY      [facility]      COLON SURGERY      COLONOSCOPY      CYSTOGRAM  12/11/2024    Procedure: CYSTOGRAM;  Surgeon: Lexi Villalba MD;  Location: [facility] OR;  Service: Urology;;    CYSTOSCOPY  09/23/2020    Procedure: CYSTOSCOPY;  Surgeon: Sascha Florentino MD;  Location: [address]  FLR;  Service: Urology;;    CYSTOSCOPY N/A 12/11/2024    Procedure: CYSTOSCOPY;  Surgeon: Lexi Villalba MD;  Location: [facility] OR;  Service: Urology;  Laterality: N/A;    CYSTOSCOPY W/ URETERAL STENT PLACEMENT Left 09/15/2020    Procedure: CYSTOSCOPY, WITH URETERAL STENT INSERTION;  Surgeon: Sascha Florentino MD;  Location: [facility] OR 1ST FLR;  Service: Urology;  Laterality: Left;    CYSTOSCOPY,WITH URETERAL CATHETER INSERTION Bilateral 3/19/2025    Procedure: CYSTOSCOPY,WITH URETERAL CATHETER INSERTION;  Surgeon: Chris Jones MD;  Location: [facility] OR 2ND FLR;  Service: Urology;  Laterality: Bilateral;    DIAGNOSTIC LAPAROSCOPY N/A 07/09/2020    Procedure: LAPAROSCOPY, DIAGNOSTIC;  Surgeon: Gilson El MD;  Location: [facility] OR;  Service: OB/GYN;  Laterality: N/A;    DIAGNOSTIC LAPAROSCOPY N/A 3/19/2025    Procedure: LAPAROSCOPY, DIAGNOSTIC;  Surgeon: DUNCAN Campbell MD;  Location: [facility] OR 2ND FLR;  Service: Colon and Rectal;  Laterality: N/A;    ESOPHAGOGASTRODUODENOSCOPY N/A 1/3/2024    Procedure: EGD (ESOPHAGOGASTRODUODENOSCOPY);  Surgeon: Lily Lind MD;  Location: [facility] ENDO (2ND FLR);  Service: Endoscopy;  Laterality: N/A;    EXCISION OF MELANOMA  07/17/2019    ILEOSCOPY N/A 1/3/2024    Procedure: ILEOSCOPY;  Surgeon: Lily Lind MD;  Location: [facility] ENDO (2ND FLR);  Service: Endoscopy;  Laterality: N/A;    ILEOSCOPY N/A 1/24/2024    Procedure: ILEOSCOPY;  Surgeon: Lilian aNvarro MD;  Location: [facility] ENDO (2ND FLR);  Service: Endoscopy;  Laterality: N/A;  through stoma    ILEOSCOPY N/A 6/4/2024    Procedure: ILEOSCOPY;  Surgeon: Peace Garcia MD;  Location: [facility] ENDO (4TH FLR);  Service: Endoscopy;  Laterality: N/A;  Ref By:kaleb Stallworth sent via portal,AC  received urgent message to reschedule pt from 7/15  to may or june-updated prep instructions sent- RIKKI  5/29/24- precall complete - ERW    ILEOSCOPY N/A 12/2/2024    Procedure: ILEOSCOPY;  Surgeon: Peace Garcia MD;   Location: [facility] ENDO (4TH FLR);  Service: Endoscopy;  Laterality: N/A;  Ref By:,portal,half [email or web address]  11/22 lvm for precall-st  11/27/24 attempted precall no answer LVM MARI  11/29-LVM for pre call.  No answe.-JM/ES  11/29-pt lvm confirming appt-KPvt    ILEOSTOMY      INSERTION OF TUNNELED CENTRAL VENOUS CATHETER (CVC) WITH SUBCUTANEOUS PORT Right 9/10/2024    Procedure: INSERTION, SINGLE LUMEN CATHETER WITH PORT, WITH FLUOROSCOPIC GUIDANCE, Rt neck or chest, prefers chest;  Surgeon: Vinh Lloyd MD;  Location: [facility] OR 2ND FLR;  Service: General;  Laterality: Right;    LAPAROSCOPIC LYSIS OF ADHESIONS N/A 07/09/2020    Procedure: LYSIS, ADHESIONS, LAPAROSCOPIC;  Surgeon: Gilson El MD;  Location: [facility] OR;  Service: OB/GYN;  Laterality: N/A;    LAPAROSCOPIC LYSIS OF ADHESIONS N/A 3/19/2025    Procedure: LYSIS, ADHESIONS, LAPAROSCOPIC;  Surgeon: DUNCAN Campbell MD;  Location: [facility] OR 2ND FLR;  Service: Colon and Rectal;  Laterality: N/A;    LAPAROSCOPIC RESECTION OF SMALL INTESTINE N/A 3/19/2025    Procedure: EXCISION, SMALL INTESTINE, LAPAROSCOPIC, ERAS low;  Surgeon: DUNCAN Campbell MD;  Location: [facility] OR 2ND FLR;  Service: Colon and Rectal;  Laterality: N/A;    LASER LITHOTRIPSY  09/23/2020    Procedure: LITHOTRIPSY, USING LASER;  Surgeon: Sascha Florentino MD;  Location: [facility] OR 1ST FLR;  Service: Urology;;    LIVER BIOPSY N/A 3/19/2025    Procedure: BIOPSY, LIVER;  Surgeon: DUNCAN Campbell MD;  Location: [facility] OR 2ND FLR;  Service: Colon and Rectal;  Laterality: N/A;    LYSIS OF ADHESIONS N/A 05/30/2019    Procedure: LYSIS, ADHESIONS;  Surgeon: Rupa German MD;  Location: [facility] OR 2ND FLR;  Service: OB/GYN;  Laterality: N/A;    MEDIPORT REMOVAL Left 9/10/2024    Procedure: REMOVAL, CATHETER, CENTRAL VENOUS, TUNNELED, WITH PORT, Left side;  Surgeon: Vinh Lloyd MD;  Location: [facility] OR [address] [place] [address];  Service: General;  Laterality: Left;    OOPHORECTOMY Right 04/16/2015     PORTACATH PLACEMENT  02/21/2017    SKIN BIOPSY      SMALL INTESTINE SURGERY      age 18 Y    TOTAL ABDOMINAL HYSTERECTOMY  04/16/2015    TOTAL COLECTOMY      TUBAL LIGATION  06/06/2012    UPPER GASTROINTESTINAL ENDOSCOPY      URETEROSCOPIC REMOVAL OF URETERIC CALCULUS  09/23/2020    Procedure: REMOVAL, CALCULUS, URETER, URETEROSCOPIC;  Surgeon: Sascha Florentino MD;  Location: [facility] OR [address] [place] [address];  Service: Urology;;    URETEROSCOPY Left 09/23/2020    Procedure: URETEROSCOPY;  Surgeon: Sascha Florentino MD;  Location: [facility] OR [address] [place] [address];  Service: Urology;  Laterality: Left;     Family History       Problem Relation (Age of Onset)    Breast cancer Maternal Cousin (41)    Cancer Maternal Grandfather, Father    Colon cancer Father    Crohn's disease Brother, Daughter    Diabetes Paternal Grandfather, Maternal Grandfather    Endometrial cancer Maternal Aunt    Hearing loss Paternal Grandmother    Heart disease Maternal Grandfather    Hyperlipidemia Father    Hypertension Mother    Liver cancer Father    Skin cancer Maternal Grandfather          Tobacco Use    Smoking status: Never     Passive exposure: Past    Smokeless tobacco: Never   Substance and Sexual Activity    Alcohol use: Not Currently     Alcohol/week: 0.0 standard drinks of alcohol    Drug use: No    Sexual activity: Yes     Partners: Male     Birth control/protection: See Surgical Hx     Comment: HYST     Review of Systems   Constitutional:  Positive for activity change. Negative for chills, fatigue, fever and unexpected weight change.   HENT:  Negative for congestion, sinus pressure, sinus pain and sore throat.    Eyes:  Negative for visual disturbance.   Respiratory:  Negative for cough, chest tightness and shortness of breath.    Cardiovascular:  Negative for chest pain and palpitations.   Gastrointestinal:  Positive for abdominal pain and nausea. Negative for constipation, diarrhea and vomiting.   Genitourinary:  Negative for difficulty urinating, flank  pain and urgency.   Musculoskeletal:  Negative for arthralgias, back pain and neck pain.   Neurological:  Positive for weakness. Negative for dizziness, light-headedness and headaches.     Objective:     Vital Signs (Most Recent):  Temp: 98.7 °F (37.1 °C) (03/27/25 1245)  Pulse: 61 (03/27/25 1600)  Resp: 18 (03/27/25 1600)  BP: 123/71 (03/27/25 1600)  SpO2: 95 % (03/27/25 1600) Vital Signs (24h Range):  Temp:  [98.7 °F (37.1 °C)-99 °F (37.2 °C)] 98.7 °F (37.1 °C)  Pulse:  [61-81] 61  Resp:  [17-20] 18  SpO2:  [95 %-100 %] 95 %  BP: ()/(57-74) 123/71     Weight: 54.4 kg (119 lb 14.9 oz)  Body mass index is 22.66 kg/m².     Physical Exam  Constitutional:       General: She is not in acute distress.  HENT:      Head: Normocephalic and atraumatic.   Eyes:      Extraocular Movements: Extraocular movements intact.      Conjunctiva/sclera: Conjunctivae normal.      Pupils: Pupils are equal, round, and reactive to light.   Cardiovascular:      Rate and Rhythm: Normal rate and regular rhythm.   Pulmonary:      Effort: Pulmonary effort is normal. No respiratory distress.   Abdominal:      Palpations: Abdomen is soft.      Comments: Mild distension  Generalized mild tenderness to palpation without and rebound or guarding   Incisions c/d/I    Neurological:      General: No focal deficit present.      Mental Status: She is alert.            Significant Labs:  BMP:   Recent Labs   Lab 03/27/25  1140      K 4.0      CO2 27   BUN 8   CREATININE 0.7   CALCIUM 9.7     CBC:   Recent Labs   Lab 03/27/25  1140   WBC 7.27   RBC 3.88*   HGB 10.7*   HCT 33.8*      MCV 87   MCH 27.6   MCHC 31.7*       Significant Diagnostics:  I have reviewed all pertinent imaging results/findings within the past 24 hours.  Assessment/Plan:     GI  S/P exploratory laparotomy  44 y.o. 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 Parsons with recurrent  admissions for SBO now s/p recent exploratory laparotomy with JUAN and SBR on 3/19/2025 by Dr. Campbell. Now with nausea and CT showing small bowel dilation. Do not see true obstruction on imaging and patient still with ostomy output. Likely post-operative ileus.     - Admit to CRS for observation  - Okay for CLD  - IVF  - no need for NGT at this time; will reconsider if develops emesis despite antiemetics   - prn antiemetics and pain meds  - Will repeat labs in AM  - Home meds started as appropriate        Thank you for your consult. I will follow-up with patient. Please contact us if you have any additional questions.    Sallie Martinez MD  Colorectal Surgery  [facility] - Emergency Dept

## 2025-03-27 NOTE — ED TRIAGE NOTES
Pt. Is a 44 yr old  female presenting to the ED with nausea and pain.  Hx of a bowel re-section last week.  Pt. Has a port in the upper right chest that was accessed last week.

## 2025-03-27 NOTE — H&P
Please see CRS Consult Note dated 3/27/2025 for full H&P.     Sallie Martinez MD  General Surgery, PGY-5

## 2025-03-27 NOTE — ASSESSMENT & PLAN NOTE
44 y.o. 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 Parsons with recurrent admissions for SBO now s/p recent exploratory laparotomy with JUAN and SBR on 3/19/2025 by Dr. Campbell. Now with nausea and CT showing small bowel dilation. Do not see true obstruction on imaging and patient still with ostomy output. Likely post-operative ileus.     - Admit to CRS for observation  - Okay for CLD  - IVF  - no need for NGT at this time; will reconsider if develops emesis despite antiemetics   - prn antiemetics and pain meds  - Will repeat labs in AM  - Home meds started as appropriate

## 2025-03-27 NOTE — ED NOTES
"Pt. Will not allow RN to place peripheral IV for access. Pt. States "Even if they do an Ultrasound IV my veins are awful and it infiltrates after 24 hours."  "

## 2025-03-27 NOTE — PROVIDER PROGRESS NOTES - EMERGENCY DEPT.
Encounter Date: 3/27/2025    ED Physician Progress Notes            ED Resident HAND-OFF NOTE:  2:11 PM 3/27/2025  Ivy Salgado is a 44 y.o. female who presented to the ED on 3/27/2025 and has been managed by Dr. Crowell, who reports patient C/O abdominal pain. I assumed care of patient from off-going ED physician team at 2:11 PM pending GS consult recs.     44 year old F with adhesion surgery 8 days ago and with SBO and possible abscess next to liver. Difficult to access but labs and CT back.    During my care, patient had continued pain, I gave her IV Ativan for nausea and can Dilaudid for pain.  Consulted with colorectal surgery who accepted patient for further management.    On my evaluation, Ivy Salgado appears well, hemodynamically stable and in NAD. Thus far, Ivy Salgado has received:  Medications   clonazePAM tablet 0.5 mg (0.5 mg Oral Given 3/27/25 2140)   mirtazapine disintegrating tablet 30 mg (30 mg Oral Given 3/27/25 2319)   propranoloL tablet 40 mg (40 mg Oral Not Given 3/27/25 2100)   pantoprazole EC tablet 40 mg (40 mg Oral Given 3/27/25 2140)   LIDOcaine (PF) 10 mg/ml (1%) injection 10 mg (has no administration in time range)   sodium chloride 0.9% flush 10 mL (has no administration in time range)   melatonin tablet 6 mg (has no administration in time range)   lactated ringers infusion ( Intravenous New Bag 3/27/25 1648)   oxyCODONE immediate release tablet 5 mg (5 mg Oral Given 3/27/25 1924)   prochlorperazine injection Soln 5 mg (5 mg Intravenous Given 3/27/25 2229)   gabapentin capsule 300 mg (300 mg Oral Given 3/27/25 2319)   methocarbamoL tablet 500 mg (has no administration in time range)   acetaminophen 1,000 mg/100 mL (10 mg/mL) injection 1,000 mg (1,000 mg Intravenous New Bag 3/27/25 2317)   sodium chloride 0.9% bolus 1,000 mL 1,000 mL (0 mLs Intravenous Stopped 3/27/25 1326)   diphenhydrAMINE injection 12.5 mg (12.5 mg Intravenous Given 3/27/25 2332)   HYDROmorphone injection 1  "mg (1 mg Intramuscular Given 3/27/25 1136)   iohexoL (OMNIPAQUE 350) injection 75 mL (75 mLs Intravenous Given 3/27/25 1239)   HYDROmorphone injection 1 mg (1 mg Intravenous Given 3/27/25 1346)   HYDROmorphone injection 1 mg (1 mg Intravenous Given 3/27/25 1522)   LORazepam injection 0.5 mg (0.5 mg Intravenous Given 3/27/25 1523)       On my exam, I appreciate:  /87   Pulse 74   Temp 98.6 °F (37 °C) (Oral)   Resp 18   Ht 5' 1" (1.549 m)   Wt 53 kg (116 lb 13.5 oz)   LMP 03/30/2015   SpO2 98%   Breastfeeding No   BMI 22.08 kg/m²   ED Course as of 03/27/25 2320   Thu Mar 27, 2025   1233 WBC: 7.27 [MB]   1233 Hemoglobin(!): 10.7 [MB]   1233 WBC: 7.27 [MB]   1233 Hemoglobin(!): 10.7 [MB]   1233 Creatinine: 0.7 [MB]      ED Course User Index  [MB] Mathieu Horton MD       Disposition: I anticipate patient will admit CRS.   I have discussed and counseled Ivy Salgado regarding exam, results, diagnosis, treatment, and plan.  ______________________  Jolene Wharton DO  Emergency Medicine Resident  2:11 PM 3/27/2025  "

## 2025-03-28 ENCOUNTER — TELEPHONE (OUTPATIENT)
Dept: HEPATOLOGY | Facility: CLINIC | Age: 45
End: 2025-03-28
Payer: COMMERCIAL

## 2025-03-28 ENCOUNTER — PATIENT MESSAGE (OUTPATIENT)
Dept: HEPATOLOGY | Facility: CLINIC | Age: 45
End: 2025-03-28
Payer: COMMERCIAL

## 2025-03-28 LAB
ABSOLUTE EOSINOPHIL (OHS): 0.21 K/UL
ABSOLUTE MONOCYTE (OHS): 0.7 K/UL (ref 0.3–1)
ABSOLUTE NEUTROPHIL COUNT (OHS): 2.64 K/UL (ref 1.8–7.7)
ANION GAP (OHS): 9 MMOL/L (ref 8–16)
BASOPHILS # BLD AUTO: 0.02 K/UL
BASOPHILS NFR BLD AUTO: 0.3 %
BUN SERPL-MCNC: 7 MG/DL (ref 6–20)
CALCIUM SERPL-MCNC: 8.8 MG/DL (ref 8.7–10.5)
CHLORIDE SERPL-SCNC: 108 MMOL/L (ref 95–110)
CO2 SERPL-SCNC: 22 MMOL/L (ref 23–29)
CREAT SERPL-MCNC: 0.7 MG/DL (ref 0.5–1.4)
ERYTHROCYTE [DISTWIDTH] IN BLOOD BY AUTOMATED COUNT: 15.5 % (ref 11.5–14.5)
GFR SERPLBLD CREATININE-BSD FMLA CKD-EPI: >60 ML/MIN/1.73/M2
GLUCOSE SERPL-MCNC: 82 MG/DL (ref 70–110)
HCT VFR BLD AUTO: 30.1 % (ref 37–48.5)
HGB BLD-MCNC: 9.6 GM/DL (ref 12–16)
IMM GRANULOCYTES # BLD AUTO: 0.01 K/UL (ref 0–0.04)
IMM GRANULOCYTES NFR BLD AUTO: 0.2 % (ref 0–0.5)
LYMPHOCYTES # BLD AUTO: 2.29 K/UL (ref 1–4.8)
MAGNESIUM SERPL-MCNC: 1.7 MG/DL (ref 1.6–2.6)
MCH RBC QN AUTO: 28.1 PG (ref 27–50)
MCHC RBC AUTO-ENTMCNC: 31.9 G/DL (ref 32–36)
MCV RBC AUTO: 88 FL (ref 82–98)
NUCLEATED RBC (/100WBC) (OHS): 0 /100 WBC
PHOSPHATE SERPL-MCNC: 4 MG/DL (ref 2.7–4.5)
PLATELET # BLD AUTO: 320 K/UL (ref 150–450)
PMV BLD AUTO: 9.7 FL (ref 9.2–12.9)
POTASSIUM SERPL-SCNC: 4.2 MMOL/L (ref 3.5–5.1)
RBC # BLD AUTO: 3.42 M/UL (ref 4–5.4)
RELATIVE EOSINOPHIL (OHS): 3.6 %
RELATIVE LYMPHOCYTE (OHS): 39 % (ref 18–48)
RELATIVE MONOCYTE (OHS): 11.9 % (ref 4–15)
RELATIVE NEUTROPHIL (OHS): 45 % (ref 38–73)
SODIUM SERPL-SCNC: 139 MMOL/L (ref 136–145)
WBC # BLD AUTO: 5.87 K/UL (ref 3.9–12.7)

## 2025-03-28 PROCEDURE — 25000003 PHARM REV CODE 250: Performed by: STUDENT IN AN ORGANIZED HEALTH CARE EDUCATION/TRAINING PROGRAM

## 2025-03-28 PROCEDURE — 96361 HYDRATE IV INFUSION ADD-ON: CPT

## 2025-03-28 PROCEDURE — 63600175 PHARM REV CODE 636 W HCPCS

## 2025-03-28 PROCEDURE — 36415 COLL VENOUS BLD VENIPUNCTURE: CPT | Performed by: STUDENT IN AN ORGANIZED HEALTH CARE EDUCATION/TRAINING PROGRAM

## 2025-03-28 PROCEDURE — 80048 BASIC METABOLIC PNL TOTAL CA: CPT | Performed by: STUDENT IN AN ORGANIZED HEALTH CARE EDUCATION/TRAINING PROGRAM

## 2025-03-28 PROCEDURE — 85025 COMPLETE CBC W/AUTO DIFF WBC: CPT | Performed by: STUDENT IN AN ORGANIZED HEALTH CARE EDUCATION/TRAINING PROGRAM

## 2025-03-28 PROCEDURE — 96376 TX/PRO/DX INJ SAME DRUG ADON: CPT

## 2025-03-28 PROCEDURE — 63600175 PHARM REV CODE 636 W HCPCS: Performed by: STUDENT IN AN ORGANIZED HEALTH CARE EDUCATION/TRAINING PROGRAM

## 2025-03-28 PROCEDURE — 25000003 PHARM REV CODE 250

## 2025-03-28 PROCEDURE — 84100 ASSAY OF PHOSPHORUS: CPT | Performed by: STUDENT IN AN ORGANIZED HEALTH CARE EDUCATION/TRAINING PROGRAM

## 2025-03-28 PROCEDURE — G0378 HOSPITAL OBSERVATION PER HR: HCPCS

## 2025-03-28 PROCEDURE — 83735 ASSAY OF MAGNESIUM: CPT | Performed by: STUDENT IN AN ORGANIZED HEALTH CARE EDUCATION/TRAINING PROGRAM

## 2025-03-28 RX ADMIN — PROCHLORPERAZINE EDISYLATE 5 MG: 5 INJECTION INTRAMUSCULAR; INTRAVENOUS at 09:03

## 2025-03-28 RX ADMIN — PANTOPRAZOLE SODIUM 40 MG: 40 TABLET, DELAYED RELEASE ORAL at 08:03

## 2025-03-28 RX ADMIN — OXYCODONE 5 MG: 5 TABLET ORAL at 02:03

## 2025-03-28 RX ADMIN — GABAPENTIN 300 MG: 300 CAPSULE ORAL at 08:03

## 2025-03-28 RX ADMIN — PROCHLORPERAZINE EDISYLATE 5 MG: 5 INJECTION INTRAMUSCULAR; INTRAVENOUS at 08:03

## 2025-03-28 RX ADMIN — CLONAZEPAM 0.5 MG: 0.5 TABLET ORAL at 08:03

## 2025-03-28 RX ADMIN — OXYCODONE 5 MG: 5 TABLET ORAL at 08:03

## 2025-03-28 RX ADMIN — MIRTAZAPINE 30 MG: 15 TABLET, ORALLY DISINTEGRATING ORAL at 08:03

## 2025-03-28 RX ADMIN — ACETAMINOPHEN 1000 MG: 10 INJECTION, SOLUTION INTRAVENOUS at 02:03

## 2025-03-28 RX ADMIN — GABAPENTIN 300 MG: 300 CAPSULE ORAL at 02:03

## 2025-03-28 RX ADMIN — CLONAZEPAM 0.5 MG: 0.5 TABLET ORAL at 09:03

## 2025-03-28 RX ADMIN — ACETAMINOPHEN 1000 MG: 10 INJECTION, SOLUTION INTRAVENOUS at 05:03

## 2025-03-28 NOTE — SUBJECTIVE & OBJECTIVE
Subjective:     Interval History:   No acute events overnight.   Patient doing much better this night.   States ostomy started putting out more.   No abdominal pain.   No nausea.   Tolerating CLD.    Post-Op Info:  * No surgery found *          Medications:  Continuous Infusions:  Scheduled Meds:   acetaminophen  1,000 mg Intravenous Q8H    clonazePAM  0.5 mg Oral BID    gabapentin  300 mg Oral TID    mirtazapine  30 mg Oral Nightly    pantoprazole  40 mg Oral BID    propranoloL  40 mg Oral BID     PRN Meds:   acetaminophen 1,000 mg/100 mL (10 mg/mL) injection 1,000 mg    clonazePAM tablet 0.5 mg    gabapentin capsule 300 mg    mirtazapine disintegrating tablet 30 mg    pantoprazole EC tablet 40 mg    propranoloL tablet 40 mg        Objective:     Vital Signs (Most Recent):  Temp: 98.4 °F (36.9 °C) (03/28/25 0515)  Pulse: 76 (03/28/25 0515)  Resp: 17 (03/28/25 0107)  BP: 98/60 (03/28/25 0515)  SpO2: 95 % (03/28/25 0515) Vital Signs (24h Range):  Temp:  [98.4 °F (36.9 °C)-98.7 °F (37.1 °C)] 98.4 °F (36.9 °C)  Pulse:  [61-81] 76  Resp:  [17-20] 17  SpO2:  [95 %-99 %] 95 %  BP: ()/(57-87) 98/60     Intake/Output - Last 3 Shifts       None             Physical Exam  Constitutional:       General: She is not in acute distress.  HENT:      Head: Normocephalic and atraumatic.   Eyes:      Extraocular Movements: Extraocular movements intact.      Conjunctiva/sclera: Conjunctivae normal.      Pupils: Pupils are equal, round, and reactive to light.   Cardiovascular:      Rate and Rhythm: Normal rate and regular rhythm.   Pulmonary:      Effort: Pulmonary effort is normal. No respiratory distress.   Abdominal:      General: There is no distension.      Palpations: Abdomen is soft.      Tenderness: There is no abdominal tenderness.      Comments: Ostomy bag with air and stool in bag   Neurological:      Mental Status: She is alert.            Significant Labs:  BMP:   Recent Labs   Lab 03/28/25  0442      K 4.2       CO2 22*   BUN 7   CREATININE 0.7   CALCIUM 8.8   MG 1.7     CBC:   Recent Labs   Lab 03/28/25  0442   WBC 5.87   RBC 3.42*   HGB 9.6*   HCT 30.1*      MCV 88   MCH 28.1   MCHC 31.9*       Significant Diagnostics:  I have reviewed all pertinent imaging results/findings within the past 24 hours.

## 2025-03-28 NOTE — ASSESSMENT & PLAN NOTE
44 y.o. 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 Parsons with recurrent admissions for SBO now s/p recent exploratory laparotomy with JUAN and SBR on 3/19/2025 by Dr. Campbell. Now with nausea and CT showing small bowel dilation. Do not see true obstruction on imaging and patient still with ostomy output. Likely post-operative ileus.     - Okay for regular diet  - d/c IVF  - prn antiemetics and pain meds  - Home meds started as appropriate    Dispo: home later today if tolerates diet

## 2025-03-28 NOTE — NURSING
Pt arrived unit via transport crying because of abdominal pain. Gabapentin and tylenol IV administered per MAR. Pt reports improvement. LR infusing at 75 ml/hr. Bed  locked and in lowest position,. Call light within reach. Pt instructed to call for assistance.  Family at bedside

## 2025-03-28 NOTE — PROGRESS NOTES
[facility]  Colorectal Surgery  Progress Note    Patient Name: Ivy Salgado  MRN: [id number]  Admission Date: 3/27/2025  Hospital Length of Stay: 0 days  Attending Physician: DUNCAN Campbell MD    Subjective:     Interval History:   No acute events overnight.   Patient doing much better this night.   States ostomy started putting out more.   No abdominal pain.   No nausea.   Tolerating CLD.    Post-Op Info:  * No surgery found *          Medications:  Continuous Infusions:  Scheduled Meds:   acetaminophen  1,000 mg Intravenous Q8H    clonazePAM  0.5 mg Oral BID    gabapentin  300 mg Oral TID    mirtazapine  30 mg Oral Nightly    pantoprazole  40 mg Oral BID    propranoloL  40 mg Oral BID     PRN Meds:   acetaminophen 1,000 mg/100 mL (10 mg/mL) injection 1,000 mg    clonazePAM tablet 0.5 mg    gabapentin capsule 300 mg    mirtazapine disintegrating tablet 30 mg    pantoprazole EC tablet 40 mg    propranoloL tablet 40 mg        Objective:     Vital Signs (Most Recent):  Temp: 98.4 °F (36.9 °C) (03/28/25 0515)  Pulse: 76 (03/28/25 0515)  Resp: 17 (03/28/25 0107)  BP: 98/60 (03/28/25 0515)  SpO2: 95 % (03/28/25 0515) Vital Signs (24h Range):  Temp:  [98.4 °F (36.9 °C)-98.7 °F (37.1 °C)] 98.4 °F (36.9 °C)  Pulse:  [61-81] 76  Resp:  [17-20] 17  SpO2:  [95 %-99 %] 95 %  BP: ()/(57-87) 98/60     Intake/Output - Last 3 Shifts       None             Physical Exam  Constitutional:       General: She is not in acute distress.  HENT:      Head: Normocephalic and atraumatic.   Eyes:      Extraocular Movements: Extraocular movements intact.      Conjunctiva/sclera: Conjunctivae normal.      Pupils: Pupils are equal, round, and reactive to light.   Cardiovascular:      Rate and Rhythm: Normal rate and regular rhythm.   Pulmonary:      Effort: Pulmonary effort is normal. No respiratory distress.   Abdominal:      General: There is no distension.      Palpations: Abdomen is soft.      Tenderness: There is no  abdominal tenderness.      Comments: Ostomy bag with air and stool in bag   Neurological:      Mental Status: She is alert.            Significant Labs:  BMP:   Recent Labs   Lab 03/28/25  0442      K 4.2      CO2 22*   BUN 7   CREATININE 0.7   CALCIUM 8.8   MG 1.7     CBC:   Recent Labs   Lab 03/28/25  0442   WBC 5.87   RBC 3.42*   HGB 9.6*   HCT 30.1*      MCV 88   MCH 28.1   MCHC 31.9*       Significant Diagnostics:  I have reviewed all pertinent imaging results/findings within the past 24 hours.  Assessment/Plan:     S/P exploratory laparotomy  44 y.o. 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 Parsons with recurrent admissions for SBO now s/p recent exploratory laparotomy with JUAN and SBR on 3/19/2025 by Dr. Campbell. Now with nausea and CT showing small bowel dilation. Do not see true obstruction on imaging and patient still with ostomy output. Likely post-operative ileus.     - Okay for regular diet  - d/c IVF  - prn antiemetics and pain meds  - Home meds started as appropriate    Dispo: home later today if tolerates diet          Sallie Martinez MD  Colorectal Surgery  [facility]

## 2025-03-28 NOTE — TELEPHONE ENCOUNTER
[facility] Pathology Conference Note    Patient:  Ivy Salgado  MRN:   [id number]  YOB: 1980  Date of Transplant:  N/A  Native Diagnosis:     Discussion/Plan:    Presenter: Hepatologist - Natali Soto MD    Reason for presenting: diagnosis confirmation intraop liver biopsy 3/19/25    Concerns for Pathologists:     Lab Results  WBC (K/uL)   Date Value   03/28/2025 5.87   03/27/2025 7.27   03/20/2025 11.34   01/27/2025 15.59 (H)   12/20/2024 5.70     PLT (K/uL)   Date Value   03/28/2025 320   03/27/2025 385   03/20/2025 253   01/27/2025 318   12/20/2024 310     INR (no units)   Date Value   02/18/2025 1.0   07/16/2024 1.0   01/10/2024 1.0     AST   Date Value   03/27/2025 24 unit/L   02/18/2025 44 U/L (H)     ALT   Date Value   03/27/2025 29 unit/L   02/18/2025 35 U/L   01/27/2025 23 U/L   12/20/2024 28 U/L     BILITOT (mg/dL)   Date Value   03/27/2025 0.3   02/18/2025 0.4   01/27/2025 0.6   12/20/2024 0.4     ALKPHOS   Date Value   03/27/2025 298 unit/L (H)   02/18/2025 143 U/L   01/27/2025 155 U/L (H)   12/20/2024 163 U/L (H)     CREATININE (mg/dL)   Date Value   03/28/2025 0.7   03/27/2025 0.7   03/20/2025 0.7   01/28/2025 0.7   01/27/2025 0.8

## 2025-03-29 LAB
ABSOLUTE EOSINOPHIL (OHS): 0.14 K/UL
ABSOLUTE EOSINOPHIL (OHS): 0.14 K/UL
ABSOLUTE MONOCYTE (OHS): 0.8 K/UL (ref 0.3–1)
ABSOLUTE MONOCYTE (OHS): 0.92 K/UL (ref 0.3–1)
ABSOLUTE NEUTROPHIL COUNT (OHS): 4.47 K/UL (ref 1.8–7.7)
ABSOLUTE NEUTROPHIL COUNT (OHS): 6.07 K/UL (ref 1.8–7.7)
ALBUMIN SERPL BCP-MCNC: 3.4 G/DL (ref 3.5–5.2)
ALP SERPL-CCNC: 309 UNIT/L (ref 40–150)
ALT SERPL W/O P-5'-P-CCNC: 21 UNIT/L (ref 10–44)
ANION GAP (OHS): 11 MMOL/L (ref 8–16)
AST SERPL-CCNC: 28 UNIT/L (ref 11–45)
BASOPHILS # BLD AUTO: 0.03 K/UL
BASOPHILS # BLD AUTO: 0.05 K/UL
BASOPHILS NFR BLD AUTO: 0.4 %
BASOPHILS NFR BLD AUTO: 0.5 %
BILIRUB SERPL-MCNC: 0.3 MG/DL (ref 0.1–1)
BILIRUB UR QL STRIP.AUTO: NEGATIVE
BUN SERPL-MCNC: 6 MG/DL (ref 6–20)
CALCIUM SERPL-MCNC: 9.7 MG/DL (ref 8.7–10.5)
CHLORIDE SERPL-SCNC: 104 MMOL/L (ref 95–110)
CLARITY UR: CLEAR
CO2 SERPL-SCNC: 22 MMOL/L (ref 23–29)
COLOR UR AUTO: YELLOW
CREAT SERPL-MCNC: 0.7 MG/DL (ref 0.5–1.4)
CRP SERPL-MCNC: 24.4 MG/L
ERYTHROCYTE [DISTWIDTH] IN BLOOD BY AUTOMATED COUNT: 15.2 % (ref 11.5–14.5)
ERYTHROCYTE [DISTWIDTH] IN BLOOD BY AUTOMATED COUNT: 15.3 % (ref 11.5–14.5)
GFR SERPLBLD CREATININE-BSD FMLA CKD-EPI: >60 ML/MIN/1.73/M2
GLUCOSE SERPL-MCNC: 112 MG/DL (ref 70–110)
GLUCOSE UR QL STRIP: NEGATIVE
HCT VFR BLD AUTO: 30.1 % (ref 37–48.5)
HCT VFR BLD AUTO: 34.2 % (ref 37–48.5)
HGB BLD-MCNC: 10.8 GM/DL (ref 12–16)
HGB BLD-MCNC: 9.5 GM/DL (ref 12–16)
HGB UR QL STRIP: NEGATIVE
IMM GRANULOCYTES # BLD AUTO: 0.03 K/UL (ref 0–0.04)
IMM GRANULOCYTES # BLD AUTO: 0.03 K/UL (ref 0–0.04)
IMM GRANULOCYTES NFR BLD AUTO: 0.3 % (ref 0–0.5)
IMM GRANULOCYTES NFR BLD AUTO: 0.4 % (ref 0–0.5)
KETONES UR QL STRIP: NEGATIVE
LEUKOCYTE ESTERASE UR QL STRIP: NEGATIVE
LYMPHOCYTES # BLD AUTO: 2.03 K/UL (ref 1–4.8)
LYMPHOCYTES # BLD AUTO: 2.18 K/UL (ref 1–4.8)
MAGNESIUM SERPL-MCNC: 1.8 MG/DL (ref 1.6–2.6)
MCH RBC QN AUTO: 27.5 PG (ref 27–50)
MCH RBC QN AUTO: 27.5 PG (ref 27–50)
MCHC RBC AUTO-ENTMCNC: 31.6 G/DL (ref 32–36)
MCHC RBC AUTO-ENTMCNC: 31.6 G/DL (ref 32–36)
MCV RBC AUTO: 87 FL (ref 82–98)
MCV RBC AUTO: 87 FL (ref 82–98)
NITRITE UR QL STRIP: NEGATIVE
NUCLEATED RBC (/100WBC) (OHS): 0 /100 WBC
NUCLEATED RBC (/100WBC) (OHS): 0 /100 WBC
PH UR STRIP: 8 [PH]
PHOSPHATE SERPL-MCNC: 3.8 MG/DL (ref 2.7–4.5)
PLATELET # BLD AUTO: 366 K/UL (ref 150–450)
PLATELET # BLD AUTO: 413 K/UL (ref 150–450)
PMV BLD AUTO: 9.3 FL (ref 9.2–12.9)
PMV BLD AUTO: 9.4 FL (ref 9.2–12.9)
POTASSIUM SERPL-SCNC: 4.4 MMOL/L (ref 3.5–5.1)
PROT SERPL-MCNC: 8.8 GM/DL (ref 6–8.4)
PROT UR QL STRIP: NEGATIVE
RBC # BLD AUTO: 3.46 M/UL (ref 4–5.4)
RBC # BLD AUTO: 3.93 M/UL (ref 4–5.4)
RELATIVE EOSINOPHIL (OHS): 1.5 %
RELATIVE EOSINOPHIL (OHS): 1.8 %
RELATIVE LYMPHOCYTE (OHS): 22.3 % (ref 18–48)
RELATIVE LYMPHOCYTE (OHS): 28.1 % (ref 18–48)
RELATIVE MONOCYTE (OHS): 11.8 % (ref 4–15)
RELATIVE MONOCYTE (OHS): 8.8 % (ref 4–15)
RELATIVE NEUTROPHIL (OHS): 57.5 % (ref 38–73)
RELATIVE NEUTROPHIL (OHS): 66.6 % (ref 38–73)
SODIUM SERPL-SCNC: 137 MMOL/L (ref 136–145)
SP GR UR STRIP: 1.01
UROBILINOGEN UR STRIP-ACNC: NEGATIVE EU/DL
WBC # BLD AUTO: 7.77 K/UL (ref 3.9–12.7)
WBC # BLD AUTO: 9.12 K/UL (ref 3.9–12.7)

## 2025-03-29 PROCEDURE — 81003 URINALYSIS AUTO W/O SCOPE: CPT

## 2025-03-29 PROCEDURE — 86140 C-REACTIVE PROTEIN: CPT | Performed by: COLON & RECTAL SURGERY

## 2025-03-29 PROCEDURE — 25000003 PHARM REV CODE 250

## 2025-03-29 PROCEDURE — G0378 HOSPITAL OBSERVATION PER HR: HCPCS

## 2025-03-29 PROCEDURE — 83735 ASSAY OF MAGNESIUM: CPT

## 2025-03-29 PROCEDURE — 84100 ASSAY OF PHOSPHORUS: CPT

## 2025-03-29 PROCEDURE — 25000003 PHARM REV CODE 250: Performed by: STUDENT IN AN ORGANIZED HEALTH CARE EDUCATION/TRAINING PROGRAM

## 2025-03-29 PROCEDURE — 80053 COMPREHEN METABOLIC PANEL: CPT

## 2025-03-29 PROCEDURE — 63600175 PHARM REV CODE 636 W HCPCS: Performed by: STUDENT IN AN ORGANIZED HEALTH CARE EDUCATION/TRAINING PROGRAM

## 2025-03-29 PROCEDURE — 36415 COLL VENOUS BLD VENIPUNCTURE: CPT | Performed by: COLON & RECTAL SURGERY

## 2025-03-29 PROCEDURE — 36415 COLL VENOUS BLD VENIPUNCTURE: CPT

## 2025-03-29 PROCEDURE — 85025 COMPLETE CBC W/AUTO DIFF WBC: CPT

## 2025-03-29 PROCEDURE — 63600175 PHARM REV CODE 636 W HCPCS

## 2025-03-29 PROCEDURE — 25000003 PHARM REV CODE 250: Performed by: INTERNAL MEDICINE

## 2025-03-29 PROCEDURE — 96376 TX/PRO/DX INJ SAME DRUG ADON: CPT

## 2025-03-29 RX ORDER — ACETAMINOPHEN 325 MG/1
650 TABLET ORAL EVERY 6 HOURS PRN
Status: DISCONTINUED | OUTPATIENT
Start: 2025-03-29 | End: 2025-03-31 | Stop reason: HOSPADM

## 2025-03-29 RX ORDER — MUPIROCIN 20 MG/G
OINTMENT TOPICAL 2 TIMES DAILY
Status: DISCONTINUED | OUTPATIENT
Start: 2025-03-29 | End: 2025-03-31 | Stop reason: HOSPADM

## 2025-03-29 RX ORDER — VALACYCLOVIR HYDROCHLORIDE 500 MG/1
500 TABLET, FILM COATED ORAL DAILY
Qty: 90 TABLET | Refills: 0 | Status: SHIPPED | OUTPATIENT
Start: 2025-03-29

## 2025-03-29 RX ADMIN — CLONAZEPAM 0.5 MG: 0.5 TABLET ORAL at 08:03

## 2025-03-29 RX ADMIN — MIRTAZAPINE 30 MG: 15 TABLET, ORALLY DISINTEGRATING ORAL at 08:03

## 2025-03-29 RX ADMIN — OXYCODONE 5 MG: 5 TABLET ORAL at 03:03

## 2025-03-29 RX ADMIN — PANTOPRAZOLE SODIUM 40 MG: 40 TABLET, DELAYED RELEASE ORAL at 08:03

## 2025-03-29 RX ADMIN — GABAPENTIN 300 MG: 300 CAPSULE ORAL at 03:03

## 2025-03-29 RX ADMIN — OXYCODONE 5 MG: 5 TABLET ORAL at 08:03

## 2025-03-29 RX ADMIN — OXYCODONE 5 MG: 5 TABLET ORAL at 02:03

## 2025-03-29 RX ADMIN — METHOCARBAMOL 500 MG: 500 TABLET ORAL at 09:03

## 2025-03-29 RX ADMIN — METHOCARBAMOL 500 MG: 500 TABLET ORAL at 02:03

## 2025-03-29 RX ADMIN — GABAPENTIN 300 MG: 300 CAPSULE ORAL at 08:03

## 2025-03-29 RX ADMIN — ACETAMINOPHEN 650 MG: 325 TABLET ORAL at 06:03

## 2025-03-29 RX ADMIN — MUPIROCIN: 20 OINTMENT TOPICAL at 09:03

## 2025-03-29 RX ADMIN — SODIUM CHLORIDE, POTASSIUM CHLORIDE, SODIUM LACTATE AND CALCIUM CHLORIDE 500 ML: 600; 310; 30; 20 INJECTION, SOLUTION INTRAVENOUS at 09:03

## 2025-03-29 RX ADMIN — OXYCODONE 5 MG: 5 TABLET ORAL at 09:03

## 2025-03-29 RX ADMIN — PROCHLORPERAZINE EDISYLATE 5 MG: 5 INJECTION INTRAMUSCULAR; INTRAVENOUS at 09:03

## 2025-03-29 NOTE — SUBJECTIVE & OBJECTIVE
Subjective:     Interval History: NAEON. AF. HDS. States she is having some trouble eating but denies N/V. She is having output in her ostomy appliance.     Post-Op Info:  * No surgery found *          Medications:  Continuous Infusions:  Scheduled Meds:   clonazePAM  0.5 mg Oral BID    gabapentin  300 mg Oral TID    mirtazapine  30 mg Oral Nightly    pantoprazole  40 mg Oral BID    propranoloL  40 mg Oral BID     PRN Meds:   clonazePAM tablet 0.5 mg    gabapentin capsule 300 mg    mirtazapine disintegrating tablet 30 mg    pantoprazole EC tablet 40 mg    propranoloL tablet 40 mg        Objective:     Vital Signs (Most Recent):  Temp: 99.9 °F (37.7 °C) (03/29/25 0729)  Pulse: 77 (03/29/25 0729)  Resp: 18 (03/29/25 0729)  BP: 131/61 (03/29/25 0729)  SpO2: 99 % (03/29/25 0729) Vital Signs (24h Range):  Temp:  [97 °F (36.1 °C)-99.9 °F (37.7 °C)] 99.9 °F (37.7 °C)  Pulse:  [77-90] 77  Resp:  [16-18] 18  SpO2:  [94 %-99 %] 99 %  BP: ()/(51-69) 131/61     Intake/Output - Last 3 Shifts         03/27 0700  03/28 0659 03/28 0700  03/29 0659 03/29 0700  03/30 0659    P.O.  360     Total Intake(mL/kg)  360 (6.8)     Urine (mL/kg/hr)  0 (0)     Stool  75     Total Output  75     Net  +285            Urine Occurrence  2 x              Physical Exam  Constitutional:       General: She is not in acute distress.  HENT:      Head: Normocephalic and atraumatic.   Eyes:      Extraocular Movements: Extraocular movements intact.      Conjunctiva/sclera: Conjunctivae normal.      Pupils: Pupils are equal, round, and reactive to light.   Cardiovascular:      Rate and Rhythm: Normal rate and regular rhythm.   Pulmonary:      Effort: Pulmonary effort is normal. No respiratory distress.   Abdominal:      General: There is no distension.      Palpations: Abdomen is soft.      Tenderness: There is no abdominal tenderness.      Comments: Ostomy bag with air and stool in bag   Neurological:      Mental Status: She is alert.            "  Significant Labs:  BMP (Last 3 Results):   Recent Labs   Lab 03/27/25  1140 03/28/25  0442    139   K 4.0 4.2    108   CO2 27 22*   BUN 8 7   CREATININE 0.7 0.7   CALCIUM 9.7 8.8   MG  --  1.7     CBC (Last 3 Results):   Recent Labs   Lab 03/27/25  1140 03/28/25  0442   WBC 7.27 5.87   RBC 3.88* 3.42*   HGB 10.7* 9.6*   HCT 33.8* 30.1*    320   MCV 87 88   MCH 27.6 28.1   MCHC 31.7* 31.9*     CRP (Last 3 Results): No results for input(s): "CRP" in the last 168 hours.    Significant Diagnostics:  I have reviewed all pertinent imaging results/findings within the past 24 hours.  "

## 2025-03-29 NOTE — ASSESSMENT & PLAN NOTE
44 y.o. 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 Parsons with recurrent admissions for SBO now s/p recent exploratory laparotomy with JUAN and SBR on 3/19/2025 by Dr. Campbell. Now with nausea and CT showing small bowel dilation. Do not see true obstruction on imaging and patient still with ostomy output. Likely post-operative ileus.     - continue regular diet. Discussed small meals.  - prn antiemetics and pain meds  - Home meds started as appropriate    Dispo: home later today if tolerates diet

## 2025-03-29 NOTE — TELEPHONE ENCOUNTER
Refill Decision Note   Ivy Salgado  is requesting a refill authorization.  Brief Assessment and Rationale for Refill:  Approve     Medication Therapy Plan:       Medication Reconciliation Completed: No   Comments:     No Care Gaps recommended.     Note composed:11:34 AM 03/29/2025

## 2025-03-29 NOTE — PROGRESS NOTES
[facility]  Colorectal Surgery  Progress Note    Patient Name: Ivy Salgado  MRN: [id number]  Admission Date: 3/27/2025  Hospital Length of Stay: 0 days  Attending Physician: DUNCAN Campbell MD    Subjective:     Interval History: NAEON. AF. HDS. States she is having some trouble eating but denies N/V. She is having output in her ostomy appliance.     Post-Op Info:  * No surgery found *          Medications:  Continuous Infusions:  Scheduled Meds:   clonazePAM  0.5 mg Oral BID    gabapentin  300 mg Oral TID    mirtazapine  30 mg Oral Nightly    pantoprazole  40 mg Oral BID    propranoloL  40 mg Oral BID     PRN Meds:   clonazePAM tablet 0.5 mg    gabapentin capsule 300 mg    mirtazapine disintegrating tablet 30 mg    pantoprazole EC tablet 40 mg    propranoloL tablet 40 mg        Objective:     Vital Signs (Most Recent):  Temp: 99.9 °F (37.7 °C) (03/29/25 0729)  Pulse: 77 (03/29/25 0729)  Resp: 18 (03/29/25 0729)  BP: 131/61 (03/29/25 0729)  SpO2: 99 % (03/29/25 0729) Vital Signs (24h Range):  Temp:  [97 °F (36.1 °C)-99.9 °F (37.7 °C)] 99.9 °F (37.7 °C)  Pulse:  [77-90] 77  Resp:  [16-18] 18  SpO2:  [94 %-99 %] 99 %  BP: ()/(51-69) 131/61     Intake/Output - Last 3 Shifts         03/27 0700 03/28 0659 03/28 0700 03/29 0659 03/29 0700 03/30 0659    P.O.  360     Total Intake(mL/kg)  360 (6.8)     Urine (mL/kg/hr)  0 (0)     Stool  75     Total Output  75     Net  +285            Urine Occurrence  2 x              Physical Exam  Constitutional:       General: She is not in acute distress.  HENT:      Head: Normocephalic and atraumatic.   Eyes:      Extraocular Movements: Extraocular movements intact.      Conjunctiva/sclera: Conjunctivae normal.      Pupils: Pupils are equal, round, and reactive to light.   Cardiovascular:      Rate and Rhythm: Normal rate and regular rhythm.   Pulmonary:      Effort: Pulmonary effort is normal. No respiratory distress.   Abdominal:      General: There is no  "distension.      Palpations: Abdomen is soft.      Tenderness: There is no abdominal tenderness.      Comments: Ostomy bag with air and stool in bag   Neurological:      Mental Status: She is alert.             Significant Labs:  BMP (Last 3 Results):   Recent Labs   Lab 03/27/25  1140 03/28/25  0442    139   K 4.0 4.2    108   CO2 27 22*   BUN 8 7   CREATININE 0.7 0.7   CALCIUM 9.7 8.8   MG  --  1.7     CBC (Last 3 Results):   Recent Labs   Lab 03/27/25  1140 03/28/25  0442   WBC 7.27 5.87   RBC 3.88* 3.42*   HGB 10.7* 9.6*   HCT 33.8* 30.1*    320   MCV 87 88   MCH 27.6 28.1   MCHC 31.7* 31.9*     CRP (Last 3 Results): No results for input(s): "CRP" in the last 168 hours.    Significant Diagnostics:  I have reviewed all pertinent imaging results/findings within the past 24 hours.  Assessment/Plan:     S/P exploratory laparotomy  44 y.o. 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 Parsons with recurrent admissions for SBO now s/p recent exploratory laparotomy with JUAN and SBR on 3/19/2025 by Dr. Campbell. Now with nausea and CT showing small bowel dilation. Do not see true obstruction on imaging and patient still with ostomy output. Likely post-operative ileus.     - continue regular diet. Discussed small meals.  - prn antiemetics and pain meds  - Home meds started as appropriate    Dispo: home later today if tolerates diet          James Corral MD  Colorectal Surgery  [facility]        "

## 2025-03-29 NOTE — CARE UPDATE
"Paged by RN for patient with mild fever to 100.7F and feeling poorly.  I evaluated the patient who reported feeling fatigued with low appetite since last night.  Has had some mild nausea and increased abdominal discomfort, no emesis. Reports a sensation of incomplete bladder emptying with urination since her surgery, no pain with urination or urinary frequency.  Denies dyspnea.  On exam, she has stable vitals and is satting well on room air.  Abdomen is mildly tender to palpation diffusely, slightly distended, nonperitonitic.  Ostomy productive of liquid stool, normally applesauce" consistency per patient.    Tylenol ordered; stat labs, UA, chest x-ray, and KUB ordered. Will f/u and continue to monitor the pt.     Anthony Hicks MD  General Surgery PGY-1    "

## 2025-03-30 PROCEDURE — 25000003 PHARM REV CODE 250

## 2025-03-30 PROCEDURE — 25000003 PHARM REV CODE 250: Performed by: STUDENT IN AN ORGANIZED HEALTH CARE EDUCATION/TRAINING PROGRAM

## 2025-03-30 PROCEDURE — 96376 TX/PRO/DX INJ SAME DRUG ADON: CPT

## 2025-03-30 PROCEDURE — 63600175 PHARM REV CODE 636 W HCPCS: Performed by: STUDENT IN AN ORGANIZED HEALTH CARE EDUCATION/TRAINING PROGRAM

## 2025-03-30 PROCEDURE — 20600001 HC STEP DOWN PRIVATE ROOM

## 2025-03-30 RX ORDER — ACETAMINOPHEN 500 MG
1000 TABLET ORAL ONCE
Status: COMPLETED | OUTPATIENT
Start: 2025-03-30 | End: 2025-03-30

## 2025-03-30 RX ORDER — AMOXICILLIN AND CLAVULANATE POTASSIUM 875; 125 MG/1; MG/1
1 TABLET, FILM COATED ORAL EVERY 12 HOURS
Status: DISCONTINUED | OUTPATIENT
Start: 2025-03-30 | End: 2025-03-31 | Stop reason: HOSPADM

## 2025-03-30 RX ADMIN — PROPRANOLOL HYDROCHLORIDE 40 MG: 20 TABLET ORAL at 08:03

## 2025-03-30 RX ADMIN — AMOXICILLIN AND CLAVULANATE POTASSIUM 1 TABLET: 875; 125 TABLET, FILM COATED ORAL at 08:03

## 2025-03-30 RX ADMIN — GABAPENTIN 300 MG: 300 CAPSULE ORAL at 04:03

## 2025-03-30 RX ADMIN — OXYCODONE 5 MG: 5 TABLET ORAL at 06:03

## 2025-03-30 RX ADMIN — ACETAMINOPHEN 1000 MG: 500 TABLET ORAL at 06:03

## 2025-03-30 RX ADMIN — METHOCARBAMOL 500 MG: 500 TABLET ORAL at 06:03

## 2025-03-30 RX ADMIN — OXYCODONE 5 MG: 5 TABLET ORAL at 12:03

## 2025-03-30 RX ADMIN — MIRTAZAPINE 30 MG: 15 TABLET, ORALLY DISINTEGRATING ORAL at 08:03

## 2025-03-30 RX ADMIN — MUPIROCIN: 20 OINTMENT TOPICAL at 08:03

## 2025-03-30 RX ADMIN — METHOCARBAMOL 500 MG: 500 TABLET ORAL at 04:03

## 2025-03-30 RX ADMIN — CLONAZEPAM 0.5 MG: 0.5 TABLET ORAL at 08:03

## 2025-03-30 RX ADMIN — PANTOPRAZOLE SODIUM 40 MG: 40 TABLET, DELAYED RELEASE ORAL at 08:03

## 2025-03-30 RX ADMIN — OXYCODONE 5 MG: 5 TABLET ORAL at 08:03

## 2025-03-30 RX ADMIN — GABAPENTIN 300 MG: 300 CAPSULE ORAL at 08:03

## 2025-03-30 RX ADMIN — PROCHLORPERAZINE EDISYLATE 5 MG: 5 INJECTION INTRAMUSCULAR; INTRAVENOUS at 12:03

## 2025-03-30 RX ADMIN — AMOXICILLIN AND CLAVULANATE POTASSIUM 1 TABLET: 875; 125 TABLET, FILM COATED ORAL at 10:03

## 2025-03-30 NOTE — SUBJECTIVE & OBJECTIVE
Subjective:     Interval History: NAEON. Febrile to 100.7 with negative UA/CXR/KUB overnight. No leukocytosis. Feeling well this morning.     Post-Op Info:  * No surgery found *          Medications:  Continuous Infusions:  Scheduled Meds:   amoxicillin-clavulanate 875-125mg  1 tablet Oral Q12H    clonazePAM  0.5 mg Oral BID    gabapentin  300 mg Oral TID    mirtazapine  30 mg Oral Nightly    mupirocin   Nasal BID    pantoprazole  40 mg Oral BID    propranoloL  40 mg Oral BID     PRN Meds:   amoxicillin-clavulanate 875-125mg per tablet 1 tablet    clonazePAM tablet 0.5 mg    gabapentin capsule 300 mg    mirtazapine disintegrating tablet 30 mg    mupirocin 2 % ointment    pantoprazole EC tablet 40 mg    propranoloL tablet 40 mg        Objective:     Vital Signs (Most Recent):  Temp: 97.4 °F (36.3 °C) (03/30/25 0711)  Pulse: 86 (03/30/25 0711)  Resp: 18 (03/30/25 0711)  BP: 117/63 (03/30/25 0711)  SpO2: 98 % (03/30/25 0711) Vital Signs (24h Range):  Temp:  [97.4 °F (36.3 °C)-100.7 °F (38.2 °C)] 97.4 °F (36.3 °C)  Pulse:  [77-86] 86  Resp:  [18] 18  SpO2:  [95 %-98 %] 98 %  BP: ()/(55-73) 117/63     Intake/Output - Last 3 Shifts         03/28 0700 03/29 0659 03/29 0700 03/30 0659 03/30 0700 03/31 0659    P.O. 360 600     IV Piggyback  500     Total Intake(mL/kg) 360 (6.8) 1100 (20.8)     Urine (mL/kg/hr) 0 (0) 0 (0)     Stool 75 200     Total Output 75 200     Net +285 +900            Urine Occurrence 2 x 5 x              Physical Exam  Constitutional:       General: She is not in acute distress.  HENT:      Head: Normocephalic and atraumatic.   Eyes:      Extraocular Movements: Extraocular movements intact.      Conjunctiva/sclera: Conjunctivae normal.      Pupils: Pupils are equal, round, and reactive to light.   Cardiovascular:      Rate and Rhythm: Normal rate and regular rhythm.   Pulmonary:      Effort: Pulmonary effort is normal. No respiratory distress.   Abdominal:      General: There is no  distension.      Palpations: Abdomen is soft.      Tenderness: There is no abdominal tenderness.      Comments: Ostomy bag with air and stool in bag  Some redness along the incision. No fluctuance/edema/drainage.    Neurological:      Mental Status: She is alert.               Significant Labs:  CBC (Last 3 Results):   Recent Labs   Lab 03/28/25  0442 03/29/25  1109 03/29/25  1746   WBC 5.87 7.77 9.12   RBC 3.42* 3.46* 3.93*   HGB 9.6* 9.5* 10.8*   HCT 30.1* 30.1* 34.2*    366 413   MCV 88 87 87   MCH 28.1 27.5 27.5   MCHC 31.9* 31.6* 31.6*     CMP (Last 3 Results):   Recent Labs   Lab 03/27/25  1140 03/28/25  0442 03/29/25  1746   CALCIUM 9.7 8.8 9.7   ALBUMIN 3.2*  --  3.4*    139 137   K 4.0 4.2 4.4   CO2 27 22* 22*    108 104   BUN 8 7 6   CREATININE 0.7 0.7 0.7   ALKPHOS 298*  --  309*   ALT 29  --  21   AST 24  --  28   BILITOT 0.3  --  0.3     CRP (Last 3 Results):   Recent Labs   Lab 03/29/25  0915   CRP 24.4*       Significant Diagnostics:  I have reviewed all pertinent imaging results/findings within the past 24 hours.

## 2025-03-30 NOTE — NURSING
Pt c/o increasing fatigue. Temperature 99.7. Increased redness around incision site. Dr. Anna, surgery on call notified. See new orders.

## 2025-03-30 NOTE — PLAN OF CARE
"[facility] Plan of Care Note    Dx: Chest pain [R07.9]  Abdominal pain, unspecified abdominal location [R10.9]    Shift Events: MD ordered 500 ml bolus of LR, Low grade temp. Pain management, ostomy Care, Safety    Goals of Care: Free from infection and injury, Safety, Plan of care on-going and progressing     Neuro: AAox4    Vital Signs: BP (!) 104/58 (Patient Position: Lying)   Pulse 82   Temp 99.5 °F (37.5 °C) (Oral)   Resp 18   Ht 5' 1" (1.549 m)   Wt 53 kg (116 lb 13.5 oz)   LMP 03/30/2015   SpO2 95%   Breastfeeding No   BMI 22.08 kg/m²     Respiratory: room air     Diet: Diet Adult Regular No Soft Drinks      Is patient tolerating current diet? No, only can tolerate clear liquid diet    GTTS: n/a    Urine Output/Bowel Movement:     Intake/Output Summary (Last 24 hours) at 3/30/2025 0524  Last data filed at 3/30/2025 0255  Gross per 24 hour   Intake 980 ml   Output 50 ml   Net 930 ml          Drains/Tubes/Tube Feeds (include total output/shift):   Output by Drain (mL) 03/28/25 0701 - 03/28/25 1900 03/28/25 1901 - 03/29/25 0700 03/29/25 0701 - 03/29/25 1900 03/29/25 1901 - 03/30/25 0524   Requested LDAs do not have output data documented.          Lines: PIV x1      Accuchecks:n/a    Skin: midline abd incision with steri strips     Fall Risk Score: 12    Activity level? Independent     Any scheduled procedures? N/a    Any safety concerns? Fall precautions    Other:   "

## 2025-03-30 NOTE — ASSESSMENT & PLAN NOTE
44 y.o. 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 Parsons with recurrent admissions for SBO now s/p recent exploratory laparotomy with JUAN and SBR on 3/19/2025 by Dr. Campbell. Now with nausea and CT showing small bowel dilation. Do not see true obstruction on imaging and patient still with ostomy output. Likely post-operative ileus.     -will add augmentin today for redness along incision as well as fever overnight.   - continue regular diet. Discussed small meals.  - prn antiemetics and pain meds  - Home meds started as appropriate    Dispo: home later today if tolerates diet

## 2025-03-30 NOTE — PLAN OF CARE
[facility]  Discharge Assessment    Primary Care Provider: Luis Madden, DO     Discharge Assessment (most recent)       BRIEF DISCHARGE ASSESSMENT - 03/30/25 6947          Discharge Planning    Assessment Type Discharge Planning Brief Assessment     Resource/Environmental Concerns none     Support Systems Family members;Friends/neighbors     Assistance Needed none     Equipment Currently Used at Home none     Current Living Arrangements home     Care Facility Name n/a     Patient/Family Anticipates Transition to home     Patient/Family Anticipated Services at Transition none     DME Needed Upon Discharge  none     Discharge Plan A Home     Discharge Plan B Home        Health Literacy    How often do you need to have someone help you when you read instructions, pamphlets, or other written material from your doctor or pharmacy? Never                     SW spoke with patient's friend in 1046 for Discharge Planning Assessment. Per friends, Pt lives alone in an apartment  on a slab foundation with steps to porch and point of entry.  Patient was independent with ADLS and DID Not use DME or in-home assistive equipment. Pt is not on dialysis  or coumadin,  takes medications as prescribed / keeps refilled / has resources for all daily and prescriptive needs. Preferred pharmacy is [facility]-  Agreeable to bedside delivery. Will have help from parents/friends  and other immediate family upon discharge - Friends to provide transportation home.  All questions addressed. Unit and SW direct numbers provided. Will continue to follow for course of hospitalization       Discharge Plan A and Plan B have been determined by review of patient's clinical status, future medical and therapeutic needs, and coverage/benefits for post-acute care in coordination with multidisciplinary team members.       Gill Platt, VI  - [facility]

## 2025-03-30 NOTE — CARE UPDATE
General Surgery  Care Update    Notified by RN that patient's abdominal incision has become more red and tender, and temperature 99.7.    Patient seen and examined at bedside.  She reports slowly worsening pain in her abdomen and her midline incision.  Reports she feels her fever is returning.  Reports no other symptoms.    All vital signs WNL.  Temperature 99.7°.  Patient is resting comfortably in bed with slightly flushed cheeks.  She is in no acute distress.  Midline abdominal incision covered with steri strips with some mild erythema at superior incision. No focal swelling of incision and no drainage from incision. Abdomen soft and mildly distended. Abdomen w/ mild TTP diffusely, incision slightly more TTP. No rebound tenderness.     WBC 9.12 yesterday. Afebrile at this time. Fever workup yesterday, including labs, UA, chest x-ray, and KUB, negative.  Patient was seen by primary team this morning who noted the erythema around her incision and Augmentin was started.     1g tylenol ordered for pain. Will continue to monitor pt.   Discussed patient and plan with primary team who agrees and will reevaluate her in the morning.   Please page the General Surgery floor pager with any further concerns.    Jessica Anna MD  03/30/2025

## 2025-03-30 NOTE — PROGRESS NOTES
[facility]  Colorectal Surgery  Progress Note    Patient Name: Ivy Salgado  MRN: [id number]  Admission Date: 3/27/2025  Hospital Length of Stay: 0 days  Attending Physician: DUNCAN Campbell MD    Subjective:     Interval History: NAEON. Febrile to 100.7 with negative UA/CXR/KUB overnight. No leukocytosis. Feeling well this morning.     Post-Op Info:  * No surgery found *          Medications:  Continuous Infusions:  Scheduled Meds:   amoxicillin-clavulanate 875-125mg  1 tablet Oral Q12H    clonazePAM  0.5 mg Oral BID    gabapentin  300 mg Oral TID    mirtazapine  30 mg Oral Nightly    mupirocin   Nasal BID    pantoprazole  40 mg Oral BID    propranoloL  40 mg Oral BID     PRN Meds:   amoxicillin-clavulanate 875-125mg per tablet 1 tablet    clonazePAM tablet 0.5 mg    gabapentin capsule 300 mg    mirtazapine disintegrating tablet 30 mg    mupirocin 2 % ointment    pantoprazole EC tablet 40 mg    propranoloL tablet 40 mg        Objective:     Vital Signs (Most Recent):  Temp: 97.4 °F (36.3 °C) (03/30/25 0711)  Pulse: 86 (03/30/25 0711)  Resp: 18 (03/30/25 0711)  BP: 117/63 (03/30/25 0711)  SpO2: 98 % (03/30/25 0711) Vital Signs (24h Range):  Temp:  [97.4 °F (36.3 °C)-100.7 °F (38.2 °C)] 97.4 °F (36.3 °C)  Pulse:  [77-86] 86  Resp:  [18] 18  SpO2:  [95 %-98 %] 98 %  BP: ()/(55-73) 117/63     Intake/Output - Last 3 Shifts         03/28 0700 03/29 0659 03/29 0700 03/30 0659 03/30 0700 03/31 0659    P.O. 360 600     IV Piggyback  500     Total Intake(mL/kg) 360 (6.8) 1100 (20.8)     Urine (mL/kg/hr) 0 (0) 0 (0)     Stool 75 200     Total Output 75 200     Net +285 +900            Urine Occurrence 2 x 5 x              Physical Exam  Constitutional:       General: She is not in acute distress.  HENT:      Head: Normocephalic and atraumatic.   Eyes:      Extraocular Movements: Extraocular movements intact.      Conjunctiva/sclera: Conjunctivae normal.      Pupils: Pupils are equal, round, and reactive  to light.   Cardiovascular:      Rate and Rhythm: Normal rate and regular rhythm.   Pulmonary:      Effort: Pulmonary effort is normal. No respiratory distress.   Abdominal:      General: There is no distension.      Palpations: Abdomen is soft.      Tenderness: There is no abdominal tenderness.      Comments: Ostomy bag with air and stool in bag  Some redness along the incision. No fluctuance/edema/drainage.    Neurological:      Mental Status: She is alert.               Significant Labs:  CBC (Last 3 Results):   Recent Labs   Lab 03/28/25  0442 03/29/25  1109 03/29/25  1746   WBC 5.87 7.77 9.12   RBC 3.42* 3.46* 3.93*   HGB 9.6* 9.5* 10.8*   HCT 30.1* 30.1* 34.2*    366 413   MCV 88 87 87   MCH 28.1 27.5 27.5   MCHC 31.9* 31.6* 31.6*     CMP (Last 3 Results):   Recent Labs   Lab 03/27/25  1140 03/28/25  0442 03/29/25  1746   CALCIUM 9.7 8.8 9.7   ALBUMIN 3.2*  --  3.4*    139 137   K 4.0 4.2 4.4   CO2 27 22* 22*    108 104   BUN 8 7 6   CREATININE 0.7 0.7 0.7   ALKPHOS 298*  --  309*   ALT 29  --  21   AST 24  --  28   BILITOT 0.3  --  0.3     CRP (Last 3 Results):   Recent Labs   Lab 03/29/25  0915   CRP 24.4*       Significant Diagnostics:  I have reviewed all pertinent imaging results/findings within the past 24 hours.  Assessment/Plan:     S/P exploratory laparotomy  44 y.o. 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 Parsons with recurrent admissions for SBO now s/p recent exploratory laparotomy with JUAN and SBR on 3/19/2025 by Dr. Campbell. Now with nausea and CT showing small bowel dilation. Do not see true obstruction on imaging and patient still with ostomy output. Likely post-operative ileus.     -will add augmentin today for redness along incision as well as fever overnight.   - continue regular diet. Discussed small meals.  - prn antiemetics and pain meds  - Home meds started as appropriate    Dispo: home  later today if tolerates diet          James Corral MD  Colorectal Surgery  [facility]

## 2025-03-31 ENCOUNTER — PATIENT MESSAGE (OUTPATIENT)
Dept: ADMINISTRATIVE | Facility: HOSPITAL | Age: 45
End: 2025-03-31
Payer: COMMERCIAL

## 2025-03-31 VITALS
OXYGEN SATURATION: 95 % | DIASTOLIC BLOOD PRESSURE: 73 MMHG | HEART RATE: 89 BPM | BODY MASS INDEX: 22.07 KG/M2 | RESPIRATION RATE: 14 BRPM | SYSTOLIC BLOOD PRESSURE: 124 MMHG | TEMPERATURE: 99 F | HEIGHT: 61 IN | WEIGHT: 116.88 LBS

## 2025-03-31 PROBLEM — Z95.828 PORT-A-CATH IN PLACE: Status: ACTIVE | Noted: 2025-03-31

## 2025-03-31 LAB
ABSOLUTE EOSINOPHIL (OHS): 0.26 K/UL
ABSOLUTE MONOCYTE (OHS): 0.96 K/UL (ref 0.3–1)
ABSOLUTE NEUTROPHIL COUNT (OHS): 3.81 K/UL (ref 1.8–7.7)
BASOPHILS # BLD AUTO: 0.04 K/UL
BASOPHILS NFR BLD AUTO: 0.5 %
CRP SERPL-MCNC: 35.1 MG/L
ERYTHROCYTE [DISTWIDTH] IN BLOOD BY AUTOMATED COUNT: 15.3 % (ref 11.5–14.5)
HCT VFR BLD AUTO: 34.9 % (ref 37–48.5)
HGB BLD-MCNC: 11.1 GM/DL (ref 12–16)
IMM GRANULOCYTES # BLD AUTO: 0.02 K/UL (ref 0–0.04)
IMM GRANULOCYTES NFR BLD AUTO: 0.3 % (ref 0–0.5)
LYMPHOCYTES # BLD AUTO: 2.28 K/UL (ref 1–4.8)
MCH RBC QN AUTO: 27.6 PG (ref 27–50)
MCHC RBC AUTO-ENTMCNC: 31.8 G/DL (ref 32–36)
MCV RBC AUTO: 87 FL (ref 82–98)
NUCLEATED RBC (/100WBC) (OHS): 0 /100 WBC
PLATELET # BLD AUTO: 469 K/UL (ref 150–450)
PMV BLD AUTO: 9.2 FL (ref 9.2–12.9)
RBC # BLD AUTO: 4.02 M/UL (ref 4–5.4)
RELATIVE EOSINOPHIL (OHS): 3.5 %
RELATIVE LYMPHOCYTE (OHS): 30.9 % (ref 18–48)
RELATIVE MONOCYTE (OHS): 13 % (ref 4–15)
RELATIVE NEUTROPHIL (OHS): 51.8 % (ref 38–73)
WBC # BLD AUTO: 7.37 K/UL (ref 3.9–12.7)

## 2025-03-31 PROCEDURE — 99223 1ST HOSP IP/OBS HIGH 75: CPT | Mod: ,,, | Performed by: PHYSICIAN ASSISTANT

## 2025-03-31 PROCEDURE — 63600175 PHARM REV CODE 636 W HCPCS: Performed by: STUDENT IN AN ORGANIZED HEALTH CARE EDUCATION/TRAINING PROGRAM

## 2025-03-31 PROCEDURE — 25500020 PHARM REV CODE 255: Performed by: COLON & RECTAL SURGERY

## 2025-03-31 PROCEDURE — 25000003 PHARM REV CODE 250

## 2025-03-31 PROCEDURE — 86140 C-REACTIVE PROTEIN: CPT | Performed by: STUDENT IN AN ORGANIZED HEALTH CARE EDUCATION/TRAINING PROGRAM

## 2025-03-31 PROCEDURE — 36415 COLL VENOUS BLD VENIPUNCTURE: CPT | Performed by: STUDENT IN AN ORGANIZED HEALTH CARE EDUCATION/TRAINING PROGRAM

## 2025-03-31 PROCEDURE — 25000003 PHARM REV CODE 250: Performed by: STUDENT IN AN ORGANIZED HEALTH CARE EDUCATION/TRAINING PROGRAM

## 2025-03-31 PROCEDURE — 85025 COMPLETE CBC W/AUTO DIFF WBC: CPT | Performed by: STUDENT IN AN ORGANIZED HEALTH CARE EDUCATION/TRAINING PROGRAM

## 2025-03-31 PROCEDURE — 25000003 PHARM REV CODE 250: Performed by: PHYSICIAN ASSISTANT

## 2025-03-31 RX ORDER — PROMETHAZINE HYDROCHLORIDE 25 MG/1
12.5 TABLET ORAL EVERY 6 HOURS PRN
Qty: 30 TABLET | Refills: 0 | Status: SHIPPED | OUTPATIENT
Start: 2025-03-31

## 2025-03-31 RX ORDER — METHOCARBAMOL 500 MG/1
500 TABLET, FILM COATED ORAL 3 TIMES DAILY PRN
Qty: 10 TABLET | Refills: 0 | Status: SHIPPED | OUTPATIENT
Start: 2025-03-31 | End: 2025-04-09

## 2025-03-31 RX ORDER — HEPARIN 100 UNIT/ML
SYRINGE INTRAVENOUS
Status: COMPLETED | OUTPATIENT
Start: 2025-03-31 | End: 2025-03-31

## 2025-03-31 RX ORDER — AMOXICILLIN AND CLAVULANATE POTASSIUM 875; 125 MG/1; MG/1
1 TABLET, FILM COATED ORAL EVERY 12 HOURS
Qty: 10 TABLET | Refills: 0 | Status: SHIPPED | OUTPATIENT
Start: 2025-03-31 | End: 2025-04-05

## 2025-03-31 RX ORDER — LIDOCAINE AND PRILOCAINE 25; 25 MG/G; MG/G
CREAM TOPICAL ONCE
Status: COMPLETED | OUTPATIENT
Start: 2025-03-31 | End: 2025-03-31

## 2025-03-31 RX ORDER — ACETAMINOPHEN 325 MG/1
650 TABLET ORAL EVERY 6 HOURS PRN
COMMUNITY
Start: 2025-03-31

## 2025-03-31 RX ADMIN — PANTOPRAZOLE SODIUM 40 MG: 40 TABLET, DELAYED RELEASE ORAL at 09:03

## 2025-03-31 RX ADMIN — METHOCARBAMOL 500 MG: 500 TABLET ORAL at 09:03

## 2025-03-31 RX ADMIN — GABAPENTIN 300 MG: 300 CAPSULE ORAL at 02:03

## 2025-03-31 RX ADMIN — PROCHLORPERAZINE EDISYLATE 5 MG: 5 INJECTION INTRAMUSCULAR; INTRAVENOUS at 12:03

## 2025-03-31 RX ADMIN — LIDOCAINE AND PRILOCAINE: 25; 25 CREAM TOPICAL at 09:03

## 2025-03-31 RX ADMIN — OXYCODONE 5 MG: 5 TABLET ORAL at 05:03

## 2025-03-31 RX ADMIN — HEPARIN 5 ML: 100 SYRINGE at 10:03

## 2025-03-31 RX ADMIN — GABAPENTIN 300 MG: 300 CAPSULE ORAL at 09:03

## 2025-03-31 RX ADMIN — METHOCARBAMOL 500 MG: 500 TABLET ORAL at 02:03

## 2025-03-31 RX ADMIN — IOHEXOL 5 ML: 300 INJECTION, SOLUTION INTRAVENOUS at 10:03

## 2025-03-31 RX ADMIN — MUPIROCIN: 20 OINTMENT TOPICAL at 09:03

## 2025-03-31 RX ADMIN — OXYCODONE 5 MG: 5 TABLET ORAL at 11:03

## 2025-03-31 RX ADMIN — OXYCODONE 5 MG: 5 TABLET ORAL at 04:03

## 2025-03-31 RX ADMIN — ACETAMINOPHEN 650 MG: 325 TABLET ORAL at 03:03

## 2025-03-31 RX ADMIN — AMOXICILLIN AND CLAVULANATE POTASSIUM 1 TABLET: 875; 125 TABLET, FILM COATED ORAL at 09:03

## 2025-03-31 RX ADMIN — CLONAZEPAM 0.5 MG: 0.5 TABLET ORAL at 09:03

## 2025-03-31 NOTE — DISCHARGE SUMMARY
"[facility]  DISCHARGE SUMMARY  General Surgery      Admit Date:  3/27/2025    Discharge Date and Time:  3/31/2025  12:00 PM    Attending Physician:  DUNCAN Campbell MD     Discharge Provider:  James Corral MD     Reason for Admission:  S/P exploratory laparotomy     Procedures Performed:  * No surgery found *    Hospital Course:  For details of hospital stay, please refer to daily progress notes. Briefly, this is a 44 y.o. female presented on 3/27/25 admitted for ileus s/p Exlap with JUAN on 3/19/25. CT on admission with ileus vs SBO. Her ostomy became productive and her diet was advanced. Her incision had some surrounding erythema and Augmentin was started. IR interrogated her port which is functioning.      On the day of discharge, the patient was ambulating without difficulty, voiding spontaneously, was tolerating a diet without nausea or vomiting, and pain was well controlled on PO pain medications. Discharge instructions were explained to the patient and appropriate follow-up was arranged.    Consults:  IR for interrogation of port which she passed .    Significant Diagnostic Studies:   Recent Labs   Lab 03/29/25  1109 03/29/25  1746 03/31/25  0539   WBC 7.77 9.12 7.37   HGB 9.5* 10.8* 11.1*   HCT 30.1* 34.2* 34.9*    413 469*     Recent Labs   Lab 03/29/25  0915 03/29/25  1746 03/31/25  0539   NA  --  137  --    K  --  4.4  --    CL  --  104  --    CO2  --  22*  --    BUN  --  6  --    CREATININE  --  0.7  --    CALCIUM  --  9.7  --    MG  --  1.8  --    PHOS  --  3.8  --    AST  --  28  --    ALT  --  21  --    ALKPHOS  --  309*  --    BILITOT  --  0.3  --    ALBUMIN  --  3.4*  --    CRP 24.4*  --  35.1*   No results for input(s): "INR", "PTT", "LABHEPA", "LACTATE", "TROPONINI", "CPK", "CPKMB", "MB", "BNP" in the last 72 hours.No results for input(s): "PH", "PCO2", "PO2", "HCO3" in the last 72 hours.      Final Diagnoses:   Principal Problem:  S/P exploratory laparotomy   Secondary Diagnoses: "    Active Hospital Problems    Diagnosis  POA    *S/P exploratory laparotomy [Z98.890]  Not Applicable    Port-A-Cath in place [Z95.828]  Not Applicable      Resolved Hospital Problems   No resolved problems to display.       Discharged Condition:  Good    Disposition:  Home or Self Care    Follow Up/Patient Instructions:     Medications:  Reconciled Home Medications:    Current Discharge Medication List        START taking these medications    Details   acetaminophen (TYLENOL) 325 MG tablet Take 2 tablets (650 mg total) by mouth every 6 (six) hours as needed for Temperature greater than (100.5).      amoxicillin-clavulanate 875-125mg (AUGMENTIN) 875-125 mg per tablet Take 1 tablet by mouth every 12 (twelve) hours. for 5 days  Qty: 10 tablet, Refills: 0      methocarbamoL (ROBAXIN) 500 MG Tab Take 1 tablet (500 mg total) by mouth 3 (three) times daily as needed.  Qty: 10 tablet, Refills: 0           CONTINUE these medications which have CHANGED    Details   promethazine (PHENERGAN) 25 MG tablet Take 0.5 tablets (12.5 mg total) by mouth every 6 (six) hours as needed for Nausea.  Qty: 30 tablet, Refills: 0    Associated Diagnoses: Crohn's disease of small intestine with other complication           CONTINUE these medications which have NOT CHANGED    Details   clonazePAM (KLONOPIN) 1 MG tablet Take 1 tablet (1 mg total) by mouth 2 (two) times daily.  Qty: 60 tablet, Refills: 2    Associated Diagnoses: Generalized anxiety disorder      cyclobenzaprine (FLEXERIL) 10 MG tablet Take 1 tablet (10 mg total) by mouth every evening as needed for muscle pain  Qty: 30 tablet, Refills: 2    Associated Diagnoses: Chronic left shoulder pain; S/P shoulder surgery      droNABinol (MARINOL) 10 MG capsule Take 1 capsule (10 mg total) by mouth once daily.  Qty: 90 capsule, Refills: 2    Associated Diagnoses: Crohn's disease of both small and large intestine with complication      estradiol 0.025 mg/24 hr 0.025 mg/24 hr Place 1 patch  onto the skin once a week. CHANGES ON MONDAYS  Qty: 4 patch, Refills: 11      gabapentin (NEURONTIN) 300 MG capsule Take 1 capsule (300 mg total) by mouth 2 (two) times daily.  Qty: 60 capsule, Refills: 4    Associated Diagnoses: Crohn's disease of small intestine with other complication      loperamide (IMODIUM) 1 mg/7.5 mL solution Take 4 mg by mouth 3 (three) times daily as needed for Diarrhea.      loperamide (IMODIUM) 2 mg capsule Take 2 capsules (4 mg total) by mouth 3 (three) times daily as needed for Diarrhea.  Qty: 84 capsule, Refills: 2      mirtazapine (REMERON SOL-TAB) 30 MG disintegrating tablet Take 1 tablet (30 mg total) by mouth nightly.  Qty: 90 tablet, Refills: 2    Associated Diagnoses: Generalized anxiety disorder      multivitamin (THERAGRAN) per tablet Take 1 tablet by mouth once daily.      nadoloL (CORGARD) 40 MG tablet Take 1 tablet (40 mg total) by mouth once daily. Patient needs appointment before next refill.  Qty: 90 tablet, Refills: 7    Comments: Pt has Medicare Part A, B, and D    Patient needs appointment before next refill.      oxyCODONE (ROXICODONE) 5 MG immediate release tablet Take 1 tablet (5 mg total) by mouth every 6 (six) hours as needed for Pain.  Qty: 20 tablet, Refills: 0    Comments: Quantity prescribed more than 7 day supply? No  Associated Diagnoses: SBO (small bowel obstruction)      pantoprazole (PROTONIX) 40 MG tablet Take 1 tablet (40 mg total) by mouth 2 (two) times daily.  Qty: 60 tablet, Refills: 12      vedolizumab (ENTYVIO) 300 mg SolR injection Inject 300 mg into the vein every 8 weeks.      zolpidem (AMBIEN) 10 mg Tab Take 1 tablet (10 mg total) by mouth every evening.  Qty: 30 tablet, Refills: 2    Associated Diagnoses: Anxiety      tamsulosin (FLOMAX) 0.4 mg Cap Take 1 capsule (0.4 mg total) by mouth once daily.  Qty: 30 capsule, Refills: 1    Associated Diagnoses: Flank pain      valACYclovir (VALTREX) 500 MG tablet Take 1 tablet (500 mg total) by mouth  once daily.  Qty: 90 tablet, Refills: 0           Discharge Procedure Orders   Diet Adult Regular     Lifting restrictions   Order Comments: Avoid lifting objects over 15 pounds for 4-6 wk     No dressing needed     Notify your health care provider if you experience any of the following:  temperature >100.4     Notify your health care provider if you experience any of the following:  persistent nausea and vomiting or diarrhea     Notify your health care provider if you experience any of the following:  severe uncontrolled pain     Notify your health care provider if you experience any of the following:  redness, tenderness, or signs of infection (pain, swelling, redness, odor or green/yellow discharge around incision site)     Activity as tolerated     Shower on day dressing removed (No bath)   Order Comments: Ok to shower. Let soap and water run over incision and then pat dry. Do not submerge/soak the incision for 2 weeks.         James Corral MD

## 2025-03-31 NOTE — SUBJECTIVE & OBJECTIVE
Subjective:     Interval History: ***    Post-Op Info:  * No surgery found *          Medications:  Continuous Infusions:  Scheduled Meds:   amoxicillin-clavulanate 875-125mg  1 tablet Oral Q12H    clonazePAM  0.5 mg Oral BID    gabapentin  300 mg Oral TID    mirtazapine  30 mg Oral Nightly    mupirocin   Nasal BID    pantoprazole  40 mg Oral BID    propranoloL  40 mg Oral BID     PRN Meds:   amoxicillin-clavulanate 875-125mg per tablet 1 tablet    clonazePAM tablet 0.5 mg    gabapentin capsule 300 mg    mirtazapine disintegrating tablet 30 mg    mupirocin 2 % ointment    pantoprazole EC tablet 40 mg    propranoloL tablet 40 mg        Objective:     Vital Signs (Most Recent):  Temp: 98.9 °F (37.2 °C) (03/31/25 1212)  Pulse: 75 (03/31/25 1212)  Resp: 18 (03/31/25 1212)  BP: 106/65 (03/31/25 1212)  SpO2: 97 % (03/31/25 1212) Vital Signs (24h Range):  Temp:  [98.4 °F (36.9 °C)-99.9 °F (37.7 °C)] 98.9 °F (37.2 °C)  Pulse:  [67-84] 75  Resp:  [12-20] 18  SpO2:  [97 %-99 %] 97 %  BP: (100-137)/(65-84) 106/65     Intake/Output - Last 3 Shifts         03/29 0700  03/30 0659 03/30 0700  03/31 0659 03/31 0700  04/01 0659    P.O. 600      IV Piggyback 500      Total Intake(mL/kg) 1100 (20.8)      Urine (mL/kg/hr) 0 (0) 400 (0.3) 0 (0)    Stool 200  75    Total Output 200 400 75    Net +900 -400 -75           Urine Occurrence 5 x  3 x    Stool Occurrence   1 x             Physical Exam       Anorectal Exam:  Anal Skin: {CRS Anal Skin Physical Exam:47945}    Digital Rectal Exam:  Resting Tone {desc; low/normal/high/v high:41749}  Squeeze {desc; low/normal/high/v high:07704}  Relaxation with bear down {DESC; PRESENT/ABSENT:11902}  Mass {NONE:68761}  Rectocele  {DESC; PRESENT/ABSENT:15664}  Tenderness  {DESC; PRESENT/ABSENT:22590}    Anoscopy:  Hemorrhoids  {DESC; PRESENT/ABSENT:05232}  Stigmata of bleeding  {DESC; PRESENT/ABSENT:89417}  Stigmata of prolapsed  {DESC; PRESENT/ABSENT:44392}   Distal rectal mucosa {normal,  inflamed, ulcerated, radiation changes:71010}    Significant Labs:  {Select Labs:77723}    Significant Diagnostics:  {Imaging Review:06293}

## 2025-03-31 NOTE — BRIEF OP NOTE
Radiology Post-Procedure Note    Pre Op Diagnosis: port malfunction    Post Op Diagnosis: same    Procedure: port check     Procedure performed by: Vidya Quintana MD    Written Informed Consent Obtained: Yes    Specimen Removed: NO    Estimated Blood Loss: Minimal    Findings:   Port check demonstrated a very mobile port with multiple attempts to access. Once accessed  port flushed without issue.      Patient tolerated procedure well.    Vidya Quintana MD  Interventional Radiology

## 2025-03-31 NOTE — DISCHARGE INSTRUCTIONS
Discharge Instructions     WOUND CARE  -- You have skin glue and tape over your incision(s); this will slowly flake away over the next few weeks. Do not pick at surgical glue, it will come off on its own.   -- Ok to shower; however, no baths or submerging in water (I.e. swimming, submerging in water) for at least two weeks.  -- Please keep the incision clean with soap and water, pat your incision dry, do not scrub hard over your incisions.  -- Follow ostomy care as discussed in hospital. Additional information attached in paperwork     MEDICATIONS AND PAIN CONTROL  -- Please resume all home medications as instructed and take any newly prescribed medications.  -- Most people find that over-the-counter pain medications (Tylenol combined with Ibuprofen) will be sufficient for most pain control.  -- You have been prescribed a narcotic pain medication. Please wean narcotic over the next few daysIf you're taking prescription narcotics, do not drive or operate heavy machinery. Do not drive if your pain is not controlled enough for you to react quickly safely.  -- No straining, avoid constipation. May take OTC stool softeners as described and laxatives as needed.     OTHER INSTRUCTIONS  -- Monitor for temp > 101 F, bleeding, redness, purulent drainage, or any sudden, new extreme pain. If any occur, please call our clinic or go to the emergency department if after normal business hours.  -- You may resume your regular diet as tolerated.   -- No heavy lifting (anything >10 lbs or = to a gallon of milk) or strenuous exercise until cleared by a physician. No pushing or pulling activities such as vacuuming or raking. Otherwise, activity as tolerated.   -- Follow up with Dr. Campbell in 2 weeks in clinic for a post-op check. Message sent to clinic for scheduling. Clinic # is [phone]

## 2025-03-31 NOTE — SUBJECTIVE & OBJECTIVE
Subjective:     Interval History: Seen overnight by on call intern for concerns of incision and temp of 99. Pt reports she feels like something is wrong internally but is unable to define a symptom related. CRP 35 from 24. AF. HDS. WBC 7 from 9.     Post-Op Info:  * No surgery found *          Medications:  Continuous Infusions:  Scheduled Meds:   amoxicillin-clavulanate 875-125mg  1 tablet Oral Q12H    clonazePAM  0.5 mg Oral BID    gabapentin  300 mg Oral TID    mirtazapine  30 mg Oral Nightly    mupirocin   Nasal BID    pantoprazole  40 mg Oral BID    propranoloL  40 mg Oral BID     PRN Meds:   amoxicillin-clavulanate 875-125mg per tablet 1 tablet    clonazePAM tablet 0.5 mg    gabapentin capsule 300 mg    mirtazapine disintegrating tablet 30 mg    mupirocin 2 % ointment    pantoprazole EC tablet 40 mg    propranoloL tablet 40 mg        Objective:     Vital Signs (Most Recent):  Temp: 99.5 °F (37.5 °C) (03/31/25 0717)  Pulse: 70 (03/31/25 0717)  Resp: 18 (03/31/25 0717)  BP: 110/72 (03/31/25 0717)  SpO2: 99 % (03/31/25 0717) Vital Signs (24h Range):  Temp:  [98.4 °F (36.9 °C)-99.9 °F (37.7 °C)] 99.5 °F (37.5 °C)  Pulse:  [67-84] 70  Resp:  [18] 18  SpO2:  [97 %-99 %] 99 %  BP: (100-130)/(62-79) 110/72     Intake/Output - Last 3 Shifts         03/29 0700 03/30 0659 03/30 0700 03/31 0659 03/31 0700 04/01 0659    P.O. 600      IV Piggyback 500      Total Intake(mL/kg) 1100 (20.8)      Urine (mL/kg/hr) 0 (0) 400 (0.3) 0 (0)    Stool 200  75    Total Output 200 400 75    Net +900 -400 -75           Urine Occurrence 5 x  3 x             Physical Exam  Constitutional:       General: She is not in acute distress.  HENT:      Head: Normocephalic and atraumatic.   Eyes:      Extraocular Movements: Extraocular movements intact.      Conjunctiva/sclera: Conjunctivae normal.      Pupils: Pupils are equal, round, and reactive to light.   Cardiovascular:      Rate and Rhythm: Normal rate and regular rhythm.   Pulmonary:       Effort: Pulmonary effort is normal. No respiratory distress.   Abdominal:      General: There is no distension.      Palpations: Abdomen is soft.      Tenderness: There is no abdominal tenderness.      Comments: Ostomy bag with air and stool in bag  Some redness along the incision. Nonblanching. No fluctuance/edema/drainage.  TTP along incision. Area of firmness at superior aspect.    Neurological:      Mental Status: She is alert.           Significant Labs:  BMP (Last 3 Results):   Recent Labs   Lab 03/27/25  1140 03/28/25  0442 03/29/25  1746    139 137   K 4.0 4.2 4.4    108 104   CO2 27 22* 22*   BUN 8 7 6   CREATININE 0.7 0.7 0.7   CALCIUM 9.7 8.8 9.7   MG  --  1.7 1.8     CBC (Last 3 Results):   Recent Labs   Lab 03/29/25  1109 03/29/25  1746 03/31/25  0539   WBC 7.77 9.12 7.37   RBC 3.46* 3.93* 4.02   HGB 9.5* 10.8* 11.1*   HCT 30.1* 34.2* 34.9*    413 469*   MCV 87 87 87   MCH 27.5 27.5 27.6   MCHC 31.6* 31.6* 31.8*     CRP (Last 3 Results):   Recent Labs   Lab 03/29/25  0915 03/31/25  0539   CRP 24.4* 35.1*       Significant Diagnostics:  None

## 2025-03-31 NOTE — ASSESSMENT & PLAN NOTE
44 y.o. 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 Parsons with recurrent admissions for SBO now s/p recent exploratory laparotomy with JUAN and SBR on 3/19/2025 by Dr. Campbell. Now with nausea and CT showing small bowel dilation. Do not see true obstruction on imaging and patient still with ostomy output. Likely post-operative ileus.     - continue augmentin  - continue regular diet. Discussed small meals.  - prn antiemetics and pain meds  - Home meds started as appropriate  - IR consulted for interrogation of her port as this was unable to be accessed x3 in the ED    Dispo: home later today if port functional

## 2025-03-31 NOTE — HOSPITAL COURSE
For details of hospital stay, please refer to daily progress notes. Briefly, this is a 44 y.o. female presented on 3/27/25 admitted for ileus s/p Exlap with JUAN on 3/19/25. CT on admission with ileus vs SBO. Her ostomy became productive and her diet was advanced. Her incision had some surrounding erythema and Augmentin was started. IR interrogated her port which is functioning.      On the day of discharge, the patient was ambulating without difficulty, voiding spontaneously, was tolerating a diet without nausea or vomiting, and pain was well controlled on PO pain medications. Discharge instructions were explained to the patient and appropriate follow-up was arranged.

## 2025-03-31 NOTE — CONSULTS
Interventional Radiology  Consult/History & Physical Note    Consult Requested By: James Corral MD  Reason for Consult: Port o cath interrogation    SUBJECTIVE:     Chief Complaint:  chest port malfunction    History of Present Illness:  Ivy Salgado is a 44 y.o. female with a PMHx of Crohn's disease s/p completion proctocolectomy with end ileostomy 6/2012 with recurrent admissions for SBO now s/p recent exploratory laparotomy with JUAN and SBR on 3/19/2025 who was admitted on 3/27/25 for malaise, N/V and c/f ileus due to small bowel dilation seen on imaging. Hospital course notable for IVF, bowel rest, and initiation of abx after pt developed fever. Interventional Radiology has been consulted for chest port interrogation. Pt reports her chest port was unable to be accessed 3 times in the ED when she was admitted. She says it was last successfully used during her admission following her recent surgery on 3/19. She reports the port was surgically placed on 9/10/24. She denies redness, drainage, and pain over the port.  Her WBC is 7.37 from 9.12. Pt has been afebrile over the past 24 hr and is hemodynamically stable. She does not have a history of PRETTY requiring nightly CPAP or difficulty breathing when lying flat. She does not take any anticoagulants. She is not currently NPO.    Review of Systems   Constitutional:  Positive for malaise/fatigue.       Scheduled Meds:   amoxicillin-clavulanate 875-125mg  1 tablet Oral Q12H    clonazePAM  0.5 mg Oral BID    gabapentin  300 mg Oral TID    LIDOcaine-prilocaine   Topical (Top) Once    mirtazapine  30 mg Oral Nightly    mupirocin   Nasal BID    pantoprazole  40 mg Oral BID    propranoloL  40 mg Oral BID     Continuous Infusions:  PRN Meds:  Current Facility-Administered Medications:     acetaminophen, 650 mg, Oral, Q6H PRN    LIDOcaine (PF) 10 mg/ml (1%), 1 mL, Intradermal, Once PRN    melatonin, 6 mg, Oral, Nightly PRN    methocarbamoL, 500 mg, Oral, TID PRN     oxyCODONE, 5 mg, Oral, Q6H PRN    prochlorperazine, 5 mg, Intravenous, Q6H PRN    sodium chloride 0.9%, 10 mL, Intravenous, PRN    Review of patient's allergies indicates:   Allergen Reactions    Azathioprine sodium Other (See Comments)     Other reaction(s): pancreatitis  Other reaction(s): pancreatitis    Methotrexate analogues Other (See Comments)     leukopenia    Stelara [ustekinumab] Other (See Comments)     Multiple infections    Zofran [ondansetron hcl (pf)] Other (See Comments)     Per patient causes prolong QT    Vancomycin analogues Other (See Comments)     Made her red    Azathioprine      Other reaction(s): Unknown    Methotrexate      Other reaction(s): infection-    Morphine Itching and Other (See Comments)     Other reaction(s): Itching    Zofran [ondansetron hcl]      Other reaction(s): Hives    Bactrim [sulfamethoxazole-trimethoprim] Palpitations    Ciprofloxacin Palpitations       Past Medical History:   Diagnosis Date    Abnormal Pap smear 2007    Alkaline phosphatase elevation- based on lab from 4/9/2019 05/28/2019    Arthritis     Avascular necrosis of bone of hip, left     Bacterial vaginosis     Crohn disease     Depression 08/05/2017    Drug-induced pancreatitis (imuran)     Encounter for blood transfusion     Generalized anxiety disorder     Genital HSV     GERD (gastroesophageal reflux disease)     Hypertension     Ileostomy in place 07/09/2012    Kidney stone     Long QT syndrome     Melanoma in situ (excised-buttocks 2018, para-stomal site 2019)     Moderate episode of recurrent major depressive disorder 02/02/2024    Multiple thyroid nodules 11/27/2018    Osteopenia of multiple sites 01/07/2019    Pancreas cyst (tail, 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)     Recurrent Clostridioides difficile diarrhea     Recurrent UTI 04/03/2013     Past Surgical History:   Procedure Laterality Date    ABDOMINAL SURGERY      APPENDECTOMY      ARTHROPLASTY OF SHOULDER Left 7/18/2023    Procedure: ARTHROPLASTY, SHOULDER;   Surgeon: Sharon Babb MD;  Location: [facility] OR;  Service: Orthopedics;  Laterality: Left;    AUGMENTATION OF BREAST Bilateral 06/2022    gel implants    BILATERAL SALPINGO-OOPHORECTOMY (BSO) Bilateral 05/30/2019    Procedure: SALPINGO-OOPHORECTOMY, BILATERAL;  Surgeon: Rupa German MD;  Location: [facility] OR 2ND FLR;  Service: OB/GYN;  Laterality: Bilateral;    BLADDER SURGERY      partial cystectomy due to fistula    breast lift      BREAST SURGERY      [facility]      COLON SURGERY      COLONOSCOPY      CYSTOGRAM  12/11/2024    Procedure: CYSTOGRAM;  Surgeon: Lexi Villalba MD;  Location: [facility] OR;  Service: Urology;;    CYSTOSCOPY  09/23/2020    Procedure: CYSTOSCOPY;  Surgeon: Sascha Florentino MD;  Location: [facility] OR 1ST FLR;  Service: Urology;;    CYSTOSCOPY N/A 12/11/2024    Procedure: CYSTOSCOPY;  Surgeon: Lexi Villalba MD;  Location: [facility] OR;  Service: Urology;  Laterality: N/A;    CYSTOSCOPY W/ URETERAL STENT PLACEMENT Left 09/15/2020    Procedure: CYSTOSCOPY, WITH URETERAL STENT INSERTION;  Surgeon: Sascha Florentino MD;  Location: [facility] OR 1ST FLR;  Service: Urology;  Laterality: Left;    CYSTOSCOPY,WITH URETERAL CATHETER INSERTION Bilateral 3/19/2025    Procedure: CYSTOSCOPY,WITH URETERAL CATHETER INSERTION;  Surgeon: Chris Jones MD;  Location: [facility] OR 2ND FLR;  Service: Urology;  Laterality: Bilateral;    DIAGNOSTIC LAPAROSCOPY N/A 07/09/2020    Procedure: LAPAROSCOPY, DIAGNOSTIC;  Surgeon: Gilson El MD;  Location: [facility] OR;  Service: OB/GYN;  Laterality: N/A;    DIAGNOSTIC LAPAROSCOPY N/A 3/19/2025    Procedure: LAPAROSCOPY, DIAGNOSTIC;  Surgeon: DUNCAN Campbell MD;  Location: [facility] OR 2ND FLR;  Service: Colon and Rectal;  Laterality: N/A;    ESOPHAGOGASTRODUODENOSCOPY N/A 1/3/2024    Procedure: EGD (ESOPHAGOGASTRODUODENOSCOPY);  Surgeon: Lily Lind MD;  Location: [facility] (2ND FLR);  Service: Endoscopy;  Laterality: N/A;    EXCISION OF MELANOMA  07/17/2019    ILEOSCOPY N/A  1/3/2024    Procedure: ILEOSCOPY;  Surgeon: Lily Lind MD;  Location: [facility] ENDO (2ND FLR);  Service: Endoscopy;  Laterality: N/A;    ILEOSCOPY N/A 1/24/2024    Procedure: ILEOSCOPY;  Surgeon: Lilian Navarro MD;  Location: [facility] ENDO (2ND FLR);  Service: Endoscopy;  Laterality: N/A;  through stoma    ILEOSCOPY N/A 6/4/2024    Procedure: ILEOSCOPY;  Surgeon: Peace Garcia MD;  Location: [facility] ENDO (4TH FLR);  Service: Endoscopy;  Laterality: N/A;  Ref By:,instr sent via portal,  received urgent message to reschedule pt from 7/15  to may or june-updated prep instructions sent-   5/29/24- precall complete - ERW    ILEOSCOPY N/A 12/2/2024    Procedure: ILEOSCOPY;  Surgeon: Peace Garcia MD;  Location: [facility] ENDO (4TH FLR);  Service: Endoscopy;  Laterality: N/A;  Ref By:,[place],half [email or web address]  11/22 lvm for precall-st  11/27/24 attempted precall no answer LVM MARI  11/29-LVM for pre call.  No answe.-/  11/29-pt lvm confirming appt-KPvt    ILEOSTOMY      INSERTION OF TUNNELED CENTRAL VENOUS CATHETER (CVC) WITH SUBCUTANEOUS PORT Right 9/10/2024    Procedure: INSERTION, SINGLE LUMEN CATHETER WITH PORT, WITH FLUOROSCOPIC GUIDANCE, Rt neck or chest, prefers chest;  Surgeon: Vinh Lloyd MD;  Location: [facility] OR 2ND FLR;  Service: General;  Laterality: Right;    LAPAROSCOPIC LYSIS OF ADHESIONS N/A 07/09/2020    Procedure: LYSIS, ADHESIONS, LAPAROSCOPIC;  Surgeon: Gilson El MD;  Location: [facility] OR;  Service: OB/GYN;  Laterality: N/A;    LAPAROSCOPIC LYSIS OF ADHESIONS N/A 3/19/2025    Procedure: LYSIS, ADHESIONS, LAPAROSCOPIC;  Surgeon: DUNCAN Campbell MD;  Location: [facility] OR 2ND FLR;  Service: Colon and Rectal;  Laterality: N/A;    LAPAROSCOPIC RESECTION OF SMALL INTESTINE N/A 3/19/2025    Procedure: EXCISION, SMALL INTESTINE, LAPAROSCOPIC, ERAS low;  Surgeon: DUNCAN Campbell MD;  Location: [facility] OR 2ND FLR;  Service: Colon and Rectal;  Laterality: N/A;    LASER  LITHOTRIPSY  09/23/2020    Procedure: LITHOTRIPSY, USING LASER;  Surgeon: Sascha Florentino MD;  Location: [facility] OR 1ST FLR;  Service: Urology;;    LIVER BIOPSY N/A 3/19/2025    Procedure: BIOPSY, LIVER;  Surgeon: DUNCAN Campbell MD;  Location: [facility] OR 2ND FLR;  Service: Colon and Rectal;  Laterality: N/A;    LYSIS OF ADHESIONS N/A 05/30/2019    Procedure: LYSIS, ADHESIONS;  Surgeon: Rupa German MD;  Location: [facility] OR 2ND FLR;  Service: OB/GYN;  Laterality: N/A;    MEDIPORT REMOVAL Left 9/10/2024    Procedure: REMOVAL, CATHETER, CENTRAL VENOUS, TUNNELED, WITH PORT, Left side;  Surgeon: Vinh Lloyd MD;  Location: [facility] OR 2ND FLR;  Service: General;  Laterality: Left;    OOPHORECTOMY Right 04/16/2015    PORTACATH PLACEMENT  02/21/2017    SKIN BIOPSY      SMALL INTESTINE SURGERY      age 18 Y    TOTAL ABDOMINAL HYSTERECTOMY  04/16/2015    TOTAL COLECTOMY      TUBAL LIGATION  06/06/2012    UPPER GASTROINTESTINAL ENDOSCOPY      URETEROSCOPIC REMOVAL OF URETERIC CALCULUS  09/23/2020    Procedure: REMOVAL, CALCULUS, URETER, URETEROSCOPIC;  Surgeon: Sascha Florentino MD;  Location: [facility] OR 1ST FLR;  Service: Urology;;    URETEROSCOPY Left 09/23/2020    Procedure: URETEROSCOPY;  Surgeon: Sascha Florentino MD;  Location: [facility] OR 1ST FLR;  Service: Urology;  Laterality: Left;     Family History   Problem Relation Name Age of Onset    Diabetes Paternal Grandfather Stephen     Hearing loss Paternal Grandmother Viviane     Cancer Maternal Grandfather Philippe         Skin    Skin cancer Maternal Grandfather Philippe     Diabetes Maternal Grandfather Philippe     Heart disease Maternal Grandfather Philippe     Colon cancer Father Milan     Cancer Father Milan         Colon    Liver cancer Father Milan     Hyperlipidemia Father Milan     Hypertension Mother Shaila     Crohn's disease Brother      Endometrial cancer Maternal Aunt      Breast cancer Maternal Cousin  41    Crohn's disease Daughter      Ovarian  cancer Neg Hx      Melanoma Neg Hx       Social History[1]    OBJECTIVE:     Vital Signs (Most Recent)  Temp: 99.5 °F (37.5 °C) (03/31/25 0717)  Pulse: 70 (03/31/25 0717)  Resp: 18 (03/31/25 0717)  BP: 110/72 (03/31/25 0717)  SpO2: 99 % (03/31/25 0717)    Physical Exam:  Physical Exam  Vitals and nursing note reviewed.   Constitutional:       General: She is not in acute distress.     Appearance: She is ill-appearing.   HENT:      Head: Normocephalic and atraumatic.      Right Ear: External ear normal.      Left Ear: External ear normal.   Eyes:      Extraocular Movements: Extraocular movements intact.      Conjunctiva/sclera: Conjunctivae normal.      Pupils: Pupils are equal, round, and reactive to light.   Cardiovascular:      Rate and Rhythm: Normal rate.   Pulmonary:      Effort: Pulmonary effort is normal. No respiratory distress.   Abdominal:      General: Abdomen is flat.   Musculoskeletal:      Comments: R upper chest port- no erythema, edema, TTP, induration or purulence noted   Skin:     Coloration: Skin is not jaundiced.   Neurological:      General: No focal deficit present.      Mental Status: She is alert and oriented to person, place, and time.   Psychiatric:         Mood and Affect: Mood normal.         Behavior: Behavior normal.         Thought Content: Thought content normal.         Judgment: Judgment normal.         Laboratory  I have reviewed all pertinent lab results within the past 24 hours.  CBC:   Recent Labs   Lab 03/31/25  0539   WBC 7.37   RBC 4.02   HGB 11.1*   HCT 34.9*   *   MCV 87   MCH 27.6   MCHC 31.8*     BMP:   Recent Labs   Lab 03/29/25  1746      K 4.4      CO2 22*   BUN 6   CREATININE 0.7   CALCIUM 9.7   MG 1.8     CMP:   Recent Labs   Lab 03/29/25  1746   CALCIUM 9.7   ALBUMIN 3.4*      K 4.4   CO2 22*      BUN 6   CREATININE 0.7   ALKPHOS 309*   ALT 21   AST 28   BILITOT 0.3     LFTs:   Recent Labs   Lab 03/29/25  1746   ALT 21   AST 28    ALKPHOS 309*   BILITOT 0.3   ALBUMIN 3.4*     Coagulation:   Recent Labs   Lab 03/29/25  1746   LABPROT 8.8*     Microbiology Results (last 7 days)       ** No results found for the last 168 hours. **            ASA/Mallampati  ASA: 2  Mallampati: 2    Imaging:  Recent imaging studies reviewed.     ASSESSMENT/PLAN:     Assessment:  44 y.o. female with a PMHx of Crohn's disease s/p completion proctocolectomy with end ileostomy 6/2012 with recurrent admissions for SBO now s/p recent exploratory laparotomy with JUAN and SBR on 3/19/2025 who has been referred to IR for chest port interrogation. The procedure was discussed in great detail with the patient including thorough explanations of the potential risks and benefits of chest port interrogation. Risks include but are not limited to bleeding at the puncture site, infection, port related thrombus, port dysfunction, central vein stenosis and need for additional procedures. The patient is a candidate for chest port interrogation under local anesthesia. Plan discussed with ordering physician and pt who verbalized understanding of the plan and would like to proceed.    Plan:  Will proceed with chest port interrogation under local anesthesia on 3.31.25.   Emla cream ordered  No need for NPO status  Anticoagulation history reviewed.   Coagulation labs reviewed.  Thank you for the consult. Please contact with questions via [employer] secure chat or [employer]    Time spent during patient care today was 75 minutes. This includes time spent before the visit reviewing the chart, discussing case with staff physician and ordering provider, time spent during the face to face patient visit, and time spent after the visit on documentation. Time excludes procedure time.     Kelly Hester PA-C  Interventional Radiology  Spectra: [id number]         [1]   Social History  Tobacco Use    Smoking status: Never     Passive exposure: Past    Smokeless tobacco: Never   Substance Use Topics    Alcohol  use: Not Currently     Alcohol/week: 0.0 standard drinks of alcohol    Drug use: No

## 2025-03-31 NOTE — PLAN OF CARE
Pt arrived to  for Port check. Pt oriented to unit and staff, Pt safely transferred from stretcher to procedural table. Fall risk reviewed and comfort measures utilized with interventions. Safety strap applied, position pillows to minimize pressure points. Blankets applied. Pt prepped and draped utilizing standard sterile technique. Patient placed on continuous monitoring, as required by sedation policy. Timeouts implemented utilizing standard universal time-out per department and facility policy. RN to remain at bedside with continuous monitoring. Pt resting comfortably. Denies pain/discomfort. Will continue to monitor. See flow sheets for monitoring, medication administration, and updates. patient verbalizes understanding.

## 2025-03-31 NOTE — PROGRESS NOTES
[facility]  Colorectal Surgery  Progress Note    Patient Name: Ivy Salgado  MRN: [id number]  Admission Date: 3/27/2025  Hospital Length of Stay: 1 days  Attending Physician: DUNCAN Campbell MD    Subjective:     Interval History: Seen overnight by on call intern for concerns of incision and temp of 99. Pt reports she feels like something is wrong internally but is unable to define a symptom related. CRP 35 from 24. AF. HDS. WBC 7 from 9.     Post-Op Info:  * No surgery found *          Medications:  Continuous Infusions:  Scheduled Meds:   amoxicillin-clavulanate 875-125mg  1 tablet Oral Q12H    clonazePAM  0.5 mg Oral BID    gabapentin  300 mg Oral TID    mirtazapine  30 mg Oral Nightly    mupirocin   Nasal BID    pantoprazole  40 mg Oral BID    propranoloL  40 mg Oral BID     PRN Meds:   amoxicillin-clavulanate 875-125mg per tablet 1 tablet    clonazePAM tablet 0.5 mg    gabapentin capsule 300 mg    mirtazapine disintegrating tablet 30 mg    mupirocin 2 % ointment    pantoprazole EC tablet 40 mg    propranoloL tablet 40 mg        Objective:     Vital Signs (Most Recent):  Temp: 99.5 °F (37.5 °C) (03/31/25 0717)  Pulse: 70 (03/31/25 0717)  Resp: 18 (03/31/25 0717)  BP: 110/72 (03/31/25 0717)  SpO2: 99 % (03/31/25 0717) Vital Signs (24h Range):  Temp:  [98.4 °F (36.9 °C)-99.9 °F (37.7 °C)] 99.5 °F (37.5 °C)  Pulse:  [67-84] 70  Resp:  [18] 18  SpO2:  [97 %-99 %] 99 %  BP: (100-130)/(62-79) 110/72     Intake/Output - Last 3 Shifts         03/29 0700 03/30 0659 03/30 0700 03/31 0659 03/31 0700 04/01 0659    P.O. 600      IV Piggyback 500      Total Intake(mL/kg) 1100 (20.8)      Urine (mL/kg/hr) 0 (0) 400 (0.3) 0 (0)    Stool 200  75    Total Output 200 400 75    Net +900 -400 -75           Urine Occurrence 5 x  3 x             Physical Exam  Constitutional:       General: She is not in acute distress.  HENT:      Head: Normocephalic and atraumatic.   Eyes:      Extraocular Movements: Extraocular  movements intact.      Conjunctiva/sclera: Conjunctivae normal.      Pupils: Pupils are equal, round, and reactive to light.   Cardiovascular:      Rate and Rhythm: Normal rate and regular rhythm.   Pulmonary:      Effort: Pulmonary effort is normal. No respiratory distress.   Abdominal:      General: There is no distension.      Palpations: Abdomen is soft.      Tenderness: There is no abdominal tenderness.      Comments: Ostomy bag with air and stool in bag  Some redness along the incision. Nonblanching. No fluctuance/edema/drainage.  TTP along incision. Area of firmness at superior aspect.    Neurological:      Mental Status: She is alert.           Significant Labs:  BMP (Last 3 Results):   Recent Labs   Lab 03/27/25  1140 03/28/25  0442 03/29/25  1746    139 137   K 4.0 4.2 4.4    108 104   CO2 27 22* 22*   BUN 8 7 6   CREATININE 0.7 0.7 0.7   CALCIUM 9.7 8.8 9.7   MG  --  1.7 1.8     CBC (Last 3 Results):   Recent Labs   Lab 03/29/25  1109 03/29/25  1746 03/31/25  0539   WBC 7.77 9.12 7.37   RBC 3.46* 3.93* 4.02   HGB 9.5* 10.8* 11.1*   HCT 30.1* 34.2* 34.9*    413 469*   MCV 87 87 87   MCH 27.5 27.5 27.6   MCHC 31.6* 31.6* 31.8*     CRP (Last 3 Results):   Recent Labs   Lab 03/29/25  0915 03/31/25  0539   CRP 24.4* 35.1*       Significant Diagnostics:  None  Assessment/Plan:     S/P exploratory laparotomy  44 y.o. 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 Parsons with recurrent admissions for SBO now s/p recent exploratory laparotomy with JUAN and SBR on 3/19/2025 by Dr. Campbell. Now with nausea and CT showing small bowel dilation. Do not see true obstruction on imaging and patient still with ostomy output. Likely post-operative ileus.     - continue augmentin  - continue regular diet. Discussed small meals.  - prn antiemetics and pain meds  - Home meds started as appropriate  - IR consulted for interrogation of  her port as this was unable to be accessed x3 in the ED    Dispo: home later today if port functional          James Corral MD  Colorectal Surgery  [facility]

## 2025-03-31 NOTE — PLAN OF CARE
Met with Pt in the room per Pt request, answered questions about dc and concerns Pt had about rushed dc. 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 Sw provided Patient Relations number if needed to Pt and updated nursing, dc this pm likely.

## 2025-04-01 NOTE — PLAN OF CARE
[facility]  Discharge Final Note    Primary Care Provider: Luis Madden DO    Expected Discharge Date: 3/31/2025    Final Discharge Note (most recent)       Final Note - 04/01/25 1004          Final Note    Assessment Type Final Discharge Note     Anticipated Discharge Disposition Home or Self Care     Hospital Resources/Appts/Education Provided Appointments scheduled and added to AVS        Post-Acute Status    Post-Acute Authorization Other     Other Status No Post-Acute Service Needs     Discharge Delays None known at this time                     Important Message from Medicare

## 2025-04-03 ENCOUNTER — PATIENT MESSAGE (OUTPATIENT)
Dept: INTERNAL MEDICINE | Facility: CLINIC | Age: 45
End: 2025-04-03
Payer: COMMERCIAL

## 2025-04-03 ENCOUNTER — CONFERENCE (OUTPATIENT)
Dept: TRANSPLANT | Facility: CLINIC | Age: 45
End: 2025-04-03
Payer: COMMERCIAL

## 2025-04-03 NOTE — TELEPHONE ENCOUNTER
4/3/25 Liver Pathology Conference:    Liver Biopsy: (intra-op during bowel resection):   A little fibrosis/ minimal steatosis/ no duct disease

## 2025-04-04 ENCOUNTER — PATIENT MESSAGE (OUTPATIENT)
Dept: SURGERY | Facility: CLINIC | Age: 45
End: 2025-04-04
Payer: COMMERCIAL

## 2025-04-04 ENCOUNTER — PATIENT MESSAGE (OUTPATIENT)
Dept: GASTROENTEROLOGY | Facility: CLINIC | Age: 45
End: 2025-04-04
Payer: COMMERCIAL

## 2025-04-04 DIAGNOSIS — K56.609 SBO (SMALL BOWEL OBSTRUCTION): ICD-10-CM

## 2025-04-04 RX ORDER — OXYCODONE HYDROCHLORIDE 5 MG/1
5 TABLET ORAL EVERY 6 HOURS PRN
Qty: 20 TABLET | Refills: 0 | Status: SHIPPED | OUTPATIENT
Start: 2025-04-04

## 2025-04-04 NOTE — ANESTHESIA PROCEDURE NOTES
Intubation  Performed by: Audrey Woods CRNA  Authorized by: Stephen Munoz MD     Intubation:     Induction:  Intravenous    Intubated:  Postinduction    Mask Ventilation:  Easy mask    Attempts:  1    Attempted By:  CRNA    Blade:  Sarmiento 2    Laryngeal View Grade: Grade I - full view of chords      Difficult Airway Encountered?: No      Complications:  None    Airway Device:  Oral endotracheal tube    Airway Device Size:  7.0    Style/Cuff Inflation:  Cuffed    Inflation Amount (mL):  2    Tube secured:  18    Placement Verified By:  Capnometry    Complicating Factors:  None    Findings Post-Intubation:  BS equal bilateral        
248.4

## 2025-04-06 ENCOUNTER — PATIENT MESSAGE (OUTPATIENT)
Dept: SURGERY | Facility: CLINIC | Age: 45
End: 2025-04-06
Payer: COMMERCIAL

## 2025-04-07 DIAGNOSIS — Z93.2 ILEOSTOMY IN PLACE: ICD-10-CM

## 2025-04-07 DIAGNOSIS — K50.018 CROHN'S DISEASE OF SMALL INTESTINE WITH OTHER COMPLICATION: ICD-10-CM

## 2025-04-07 DIAGNOSIS — E55.9 VITAMIN D DEFICIENCY: Primary | ICD-10-CM

## 2025-04-07 RX ORDER — AMOXICILLIN AND CLAVULANATE POTASSIUM 875; 125 MG/1; MG/1
1 TABLET, FILM COATED ORAL EVERY 12 HOURS
Qty: 10 TABLET | Refills: 0 | Status: SHIPPED | OUTPATIENT
Start: 2025-04-07 | End: 2025-04-13

## 2025-04-08 ENCOUNTER — OFFICE VISIT (OUTPATIENT)
Dept: GASTROENTEROLOGY | Facility: CLINIC | Age: 45
End: 2025-04-08
Payer: COMMERCIAL

## 2025-04-08 VITALS
SYSTOLIC BLOOD PRESSURE: 108 MMHG | HEIGHT: 61 IN | HEART RATE: 69 BPM | OXYGEN SATURATION: 99 % | TEMPERATURE: 98 F | WEIGHT: 122.81 LBS | BODY MASS INDEX: 23.19 KG/M2 | DIASTOLIC BLOOD PRESSURE: 75 MMHG

## 2025-04-08 DIAGNOSIS — K56.609 SMALL BOWEL OBSTRUCTION: ICD-10-CM

## 2025-04-08 DIAGNOSIS — Z79.899 IMMUNOSUPPRESSION DUE TO DRUG THERAPY: ICD-10-CM

## 2025-04-08 DIAGNOSIS — Z93.2 ILEOSTOMY IN PLACE: Primary | ICD-10-CM

## 2025-04-08 DIAGNOSIS — Z98.890 S/P EXPLORATORY LAPAROTOMY: ICD-10-CM

## 2025-04-08 DIAGNOSIS — D84.821 IMMUNOSUPPRESSION DUE TO DRUG THERAPY: ICD-10-CM

## 2025-04-08 DIAGNOSIS — K50.018 CROHN'S DISEASE OF SMALL INTESTINE WITH OTHER COMPLICATION: ICD-10-CM

## 2025-04-08 PROCEDURE — G2211 COMPLEX E/M VISIT ADD ON: HCPCS | Mod: S$GLB,,, | Performed by: INTERNAL MEDICINE

## 2025-04-08 PROCEDURE — 99215 OFFICE O/P EST HI 40 MIN: CPT | Mod: S$GLB,,, | Performed by: INTERNAL MEDICINE

## 2025-04-08 PROCEDURE — 3074F SYST BP LT 130 MM HG: CPT | Mod: CPTII,S$GLB,, | Performed by: INTERNAL MEDICINE

## 2025-04-08 PROCEDURE — 1111F DSCHRG MED/CURRENT MED MERGE: CPT | Mod: CPTII,S$GLB,, | Performed by: INTERNAL MEDICINE

## 2025-04-08 PROCEDURE — 3078F DIAST BP <80 MM HG: CPT | Mod: CPTII,S$GLB,, | Performed by: INTERNAL MEDICINE

## 2025-04-08 PROCEDURE — 3008F BODY MASS INDEX DOCD: CPT | Mod: CPTII,S$GLB,, | Performed by: INTERNAL MEDICINE

## 2025-04-08 PROCEDURE — 1159F MED LIST DOCD IN RCRD: CPT | Mod: CPTII,S$GLB,, | Performed by: INTERNAL MEDICINE

## 2025-04-08 PROCEDURE — 1160F RVW MEDS BY RX/DR IN RCRD: CPT | Mod: CPTII,S$GLB,, | Performed by: INTERNAL MEDICINE

## 2025-04-08 RX ORDER — IBUPROFEN 400 MG/1
400 TABLET ORAL DAILY PRN
COMMUNITY

## 2025-04-08 NOTE — PATIENT INSTRUCTIONS
- hold entyvio this week, reschedule for 4/24 then the next dose 6 weeks later to get back on original schedule (6/5) then resume regular infusions every 8 weeks   - follow up with Dr. Campbell this week   - ileoscopy in 9/2025 - ok with Dr. Ross doing it if unable to get in with Dr. Garcia   - let us know if there are any issues in the meantime   - stop ibuprofen if you can   - start vitamin B12 sublingual 1000 mcg once a day - over the counter  - okay to use OTC vaginal cream - monistat   - shingles #2 appt on 5/9 at 11am

## 2025-04-08 NOTE — PROGRESS NOTES
[facility]             Inflammatory Bowel Disease   Follow-up  Note              TODAY'S VISIT DATE:  4/8/2025    Chief Complaint:   Chief Complaint   Patient presents with    Crohn's Disease     PCP: Luis Madden    Previous History:  Ivy Salgado is a 44 y.o. female with Crohn's disease with end ileostomy and recurrent ileitis.  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  She tried multiple meds including imuran (pancreatitis, remicade (secondary nonresponder), humira (DIL), MTX (leukopenia).  She met Dr. Cameron initially in GI clinic at [facility] 5/2009 at which time she had some diarrhea and on pred 30 mg/d. Colonoscopy 6/2009 significant for diffuse proctosigmoiditis with remainder of colon normal and end to end ileocolonic anastomsis with inflammation. Placed on rowasa enemas but too expensive so switched to steroid enemas.  Flex sig 10/2009 ongoing proctosigmoiditis.  In 2010 she had rectal bleeding that improved with proctofoam and started cimzia with improvement of symptoms.  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  Colonoscopy c/w moderate proctosigmoiditis and in 5/2010 she continued proctofoam BID and took extra dose of cimzia to induce remission.  In 7/2010 flex sig confirmed active left sided colitis and she started prednisone in 3/2011 with repeat colonoscopy 8/2011 c/w ongoing rectosigmoid inflammation with mild mucosal ulcer the transverse colon with ileocolonic anastomotic stricture and normal TI. In 9/2011 pt was on canasa, cimzia and prednisone taper.  In April/May 2012 pt hospitalized and CT c/w sigmoid wall thickening and colonoscopy confirmed distal 30 cm  with moderate inflammation with ileocolonic anastomotic ulcers and normal TI.  In 6/2012 Dr Parsons proceeded with completion proctocolectomy with end ileostomy with pathology confirming transmural active inflammation with abscess c/w Crohn's disease.  Post-operatively she had peristomal ulcer and perineal wounds. In 1/2013 patient had non-healing perineal wound S/P debridement  with steroids and treated with cipro. In 8/2013 she had EUA with debridement and fulguration of non-healing perineal wound.  In 8/2013 pt had epigastric abd pain and EGD c/w benign gastric polyp, labs normal, CT c/w short segment WB thickening involving ileum proximal to the stoma and resolution of right adnexal mass.  In 9/2013 ileoscopy through stoma significant for segment mucosa at 5 cm proximal to stoma mild congested, eroded, ulcerated area of 6-30 cm and mucosal distal is normal. CT A/p 1/2024 c/w 2 separate segments of SB proximal to the ostomy and near staple row in RLQ with mild enhancement and wall thickening c/w mild inflammatory changes and segment of bowel forming ostomy.  SB enteroscopy 2/2014 significant for single 2 mm ulcer in the proximal ileum.  In 2014 she had 2 hospitalizations for high ileostomy output with was treated with prednisone with taper.  In 10/2014 CT showed few mild dilated loops SB in anterior right pelvis and loop with surgical suture line abuts anterior pelvic wall and possible adhesion. In 10/2014 ileoscopy through stomal normal.  In 1/2015 pt had focal segment of mild distal SB dilation and C diff positive and pt treated antibiotics followed by probiotics and resumed cimzia. Dr. Cameron then referred patient to Dr. Parish Ross in 2015 and he mentions recurrent C diff, recurrent SBOs likely related to adhesions. In 2015 she had SHELLIE/BSO with bladder repair and due to this missed one dose of cimzia and then resumed 5/2015. In 1/2016 she had partial SBO due to adhesions and UGI/SBFT and CT did not show fixed  obstruction. In 10/2016 she reported to Dr. Ross postprandial nausea and epigastric pain, weight loss, fatigue, low grade fever and watery stool output and prednisone helped symptoms but not as good response as past. She was off of cimzia 8-9/2016 though sx occurred prior to holding cimzia. Ileoscopy through stoma 10/2016 significant for normal 15 cm of ileum examined. Overall Dr. Ross felt that her symptoms responded well to prednisone and restarting cimzia. She presented to her OV 1/2017 with increased ileostomy output with C diff negative with symptoms ongoing and MRE 5/2017 c/w long segment of diffuse wall thickening and mucosal enhancement involving the distal ileum. CT A/P 6/2017 significant for left ovarian cyst, mild biliary dilation stable, hypodensities and punctate bilateral renal calcifications.  Ileoscopy through stoma 8/2017 c/w normal ileum from stoma to 15-20 cm. CT A/P 11/2017 significant for righ adnexal mass abutting theright external iliac vein and pelvic US recommended, bilateral renal hypodensities and calcifications. MRE 11/2017 c/w mild questionable ileal inflammation and luminal narrowing involving short segment of SB within the right hemipelvis with mild dilation and upstream bowel 2.5 cm with findings concerning for developing stricture. She stopped cimzia 6/2017 and started stelara 7/11/2017 though discontinued in 1/2018 due to rash.  Due to symptoms pt was started in 1/2018 on prednisone 10 mg/d, bentyl. In 2/2018 CT A/P c/w bilateral nonobstructing renal calculi, mild bilateral cortical scarring of lower renal poles. MRE 9/2018 significant for short segment of distal ileum with scattered regions of non uniform wall thickening and possible additional short segment of proximal jejunum with mild wall thickening. In fall 2018 she was placed on entocort though due to fast transit was opening capsule and taking this. CT A/P 12/2018 c/w several bilateral renal hypodensities c/w cysts,  nonobstructive bilateral nephrolithiasis, right adnexal complex cyst. She started entyvio 11/20/18 and due to recurrent UTIs and palpitations (dx QT prolongation and started on beta blocker) so discontinued in 10/27/20. MRE 5/15/19 significant for right adnexal cystic lesion, large left adnexal cystic lesion.  On 5/30/19 patient had exploratory laparotomy with lysis of adhesions, BSO/mass resection. CT A/P 6/2019 significant for nonspecific rim enhancing fluid collection, mild left hydroureteronephrosis. In 2019 there were several mos she held entyvio due to gyn and melanoma surgery. MRE 5/2020 significant for small non enhancing fluid collection within presacral region superior to the vaginal cuff, right sided pelvocaliectasis, bilateral cortical renal cysts, left femoral head chronic avascular necrosis. CT A/P 9/2020 c/w mild left hydrouriteronephrosis due to distal left ureter stone with ass urothelial thickening and enhancement.  MRE 4/2021 right ovarian cyst likely hemorrhagic cyst.  In 1/2022 patient was placed by Dr. Ross on pantoprazole 40 mg BID. For joint pains and chronic abdominal pain Dr. Ross treated her with gabapentin for several years. In 1/2023 she had multiple intrarenal stones.  Repeat CT 10/2023 small non-obstructing renal stones. She was hospitalized 12/31/23-1/7/24 for high ileostomy output with stool studies neg for infection.  Elevated inflammatory markers (ESR 94, CRP 22.8), stool caprotectin 281. CT A/P showed slow flow through few mid to distal SB loops noting venous vascular engorgement about these loops and wall hyperemia. On 1/3/24 she had EGD c/w mild HP neg gastritis and ileoscopy through stoma with about 6 apthous ulcers in the distal 20 cm of the ileum and biopsies c/w active inflammation.  Pt had elevated LFTs (peaked 1/4/24 ///TB normal).  Hepatology consulted and serological workup negative and subsequent LFTs normalized with no intervention. Abdominal US  revealed mildly dilated bile ducts in the central right hepatic lobe and MRCP c/w no evidence of biliary obstruction, punctate cystic focus in the pancreas tail likely side IPMN and f/u in 1 year recommended.  She was treated with antidiarrheals (imodium helped but lomotil caused palpitations) and IVFs. Lotronex was being considered but due to prolonged QT unable to proceed with this. Due to mild ileitis plan for outpatient entyvio.  Since her discharge from hospital she had continued high ileostomy output and dehydration and we recommended that she return for hospitalization but pt did not wish to go to ER.  She continued with high ileostomy output up to 8 liters/day but if fasting down to 4 liters/day despiate taking imodium 1 tab QID.  She is not taking lomotil due to palpitations.  At her OV 1/16/24 due to high ileostomy output and dehydration I proceeded with hospital admission 1/16/24-1/27/24 at which time she continued on IVFs, antidiarrheals, pain meds and started on IV solumedrol with repeat stool calprotectin done on 1/16/24 93.9 though unclear accuracy given some granulation tissue on stoma could influence this test. On 1/24/24 stool calprotectin 117.6, ileoscopy through stoma with one small apthous ulcer 19 cm proximal to stoma. She had stool studies neg for infection and then discharged home on liquid imodium, pred 40 mg with taper and plan to start entyvio. She restarted entyvio with the re induction doses on 1/30/2024 then resumed every 8 weeks infusions and by her office visit in 3/2024 patient no longer needed IVFs and was recovering well from the hospital stay. She reported recurrent UTIs with entyvio in the past and was advised to closely monitor her symptoms and since then has had recurrent UTI but overall I fel this was highly unusual given MOA of entyvio.  In 4/2024 pt hospitalized for abd pain and decreased ileostomy output and CT A/P significant for SBO with transition point seen in the  midline upper pelvis. The small bowel anastomosis and also ileostomy site are patent and do not appear to be source for suspected obstruction. This was managed conservatively and followed by MRE 5/2024 significant for short segmented stricture with mild active inflammation adjacent to ehr RLQ surgical anastomosis with tethering and distortion. Ileoscopy through stoma 6/4/24 significant of neoterminal ileum immediately proximal to the stoma normal. Patient had 3 SBOs managed conservatively in 2024 and the mild ileitis that she had was accessible by scope through stoma in 12/2024 and had resolved on entyvio. SB imaging in 11/2024 seems to indicate a SB/SB anastomotic stricture (confirmed she has SB-SB anastomosis from 1992 surgery per her mom) in RLQ that is not accessible by scope. She had recurrent UTIs but unlikely due to entyvio.      Interval History:  - current IBD meds:  Entyvio 300 mg IV q 8 weeks (11/20/18- 10/27/20- stopped due to UTIs and palpitations, reinduced 1/30/24, LD 2/13, ND 4/10) 4/24  - other medications: phenergan PRN for nausea ~2x/week;  liquid imodium PRN - currently not taking since recent hospitalization   - recent medications: augmentin BID for incision site infection (4/1/25-4/5/25; restarted 4/7 for additional 5 day course)   - NSAID use: Yes - Ibuprofen 400mg TID PRN - prescribed by CRS post surgery   - Narcotic use: Yes - Oxycodone for post surgery pain per CRS  - ileostomy output: 3-4 full bags/day; watery to apple sauce consistency; brown stool; wakes up to empty bag at night 1-2 times  - abdominal pain on right side since hospital discharge; 5-6/10 on pain scale; sometimes eating can aggravate it. Alternating APAP, IBU 400mg (up to TID), and Oxycodone multiple times a day to help with pain   - reports some nausea (phenergan helps), denies any vomiting  - incision site: red and tender; yellowish discharge  - peristomal area - no issues   - appetite has been good and energy level is  unchanged  - stool calprotectin: 1/16/24 93.9; 1/24/24 117.6; 12/23/24 70.8  - 1/27-1/29 - hospital stay for SBO without discrete transition point but dilated proximal and decompressed distal small bowel; followed by CRS inpatient, resolved with conservative management    - 1/27/25 - MRI A/P: Dilated fluid-filled small bowel loops, with suspected narrowing at the level of surgical anastomosis. Finding concerning for bowel obstruction. Of note, parents of dilated bowel similar to that seen on CT performed 10/18/2024.   - 3/19/25 - 3/21/25 - hospital stay for surgery: Laparoscopy performed with extensive adhesions up to the anterior abdominal wall. These were carefully taken down. She had multiple interloop adhesions going down into the pelvis. These were unable to be addressed laparoscopically and therefore a lower midline incision was made. Extensive lysis of adhesions was performed freeing all of the scar tissue. Her prior small bowel anastomosis was evaluated and was stenotic. Limited small bowel resection was performed resecting the anastomosis and creating a new side-to-side small bowel anastomosis. Biopsies showed chronic active enteritis with ulceration and granulation tissue associated with prior anastomosis, negative for dysplasia.   - 3/27-3/31 - admitted for ileus s/p Exlap with JUAN on 3/19/25. CT on admission with ileus vs SBO. Her ostomy became productive and her diet was advanced. Her incision had some surrounding erythema and she was discharged on 5 day course of augmentin BID.  -  3/27/25 - CT: History of Crohn's disease status post total proctocolectomy, right lower quadrant end ileostomy, and recent partial small bowel resection. Dilated small bowel loops with a transition point in the right lower quadrant near the anastomosis and distal decompression, concerning for small bowel obstruction. Mesenteric fat stranding and trace ascites.  New 0.8 cm subcapsular hypodensity in the left hepatic lobe,  which could represent a hematoma, abscess, or exudative ascites. Punctate nonobstructing renal stones bilaterally. Osteonecrosis in the left femoral head.  -  - reached out to CRS stating incision is leaking and not improving. Additional 5 day of augmentin sent to her pharmacy    - since starting augmentin reports external and internal vaginal itching and some vaginal discharge; denies increased frequency of urination or burning.     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   3/19/2025 diagnostic laparoscopy converted to open JUAN, where prior anastomosis noted to be stenosed, therefore excised and new anastomosis created.      Last pertinent Endoscopy/Imagin/3/24 EGD: normal except for gastric erythema, bx c/w mild HP neg gastritis   1/3/24 abd US:  mildly dilated bile ducts in the central right hepatic lobe   24 MRCP c/w no evidence of biliary obstruction, punctate cystic focus in the pancreas tail likely side IPMN  24 Ileoscopy: Patent end ileostomy with healthy appearing mucosa in the terminal ileum. The examined portion of the ileum was normal. Biopsies normal  10/2024:  CT A/P significant for multiple fluid-air filled loops of dilated small bowel in the right hemiabdomen and pelvis with suspected narrowing at a surgical anastomosis.  No definite transition point elsewhere.  Findings may represent small bowel obstruction.  A 4 cm segment of the right lower quadrant small bowel demonstrates findings suspicious for active segmental enteritis and/or small bowel stricture. This was managed  conservatively with resolution.  11/7/24 MRE:  Several small renal cysts noted.  RLQ chance-anastomotic SB with thickening, transmural hyperenhancement with minimal restricted diffusion involving 4.3 cm of bowel, outpouching at site of SB anastomosis.  12/11/24 cystoscopy:  Normal urethra, no visible fistulae within bladder, 3 small saccules on left lateral wall, presumably from previous colovesical fistula repair with scar tissue, cystogram with normal contour, no contrast extravasation nor fistula present.  1/27/25 MRI A/P: Dilated fluid-filled small bowel loops, with suspected narrowing at the level of surgical anastomosis. Finding concerning for bowel obstruction. Of note, parents of dilated bowel similar to that seen on CT performed 10/18/2024.     Therapeutic Drug Monitoring Labs:  None     Prior IBD Therapies:  Proctofoam - partially effective  imuran (pancreatitis)  MTX (leukopenia)  Remicade- secondary nonresponder  Humira- discontinued due to drug induced lupus due to joint pains  Entocort- fall 2018, broke open capsule when taking- not sure if effective   Cimzia [id number]- secondary nonresponder  Stelara (7/11/17-1/2018)- discontinued due to rash  Canasa ineffective   Prednisone- effective- last took 1-2/2024    Vaccinations:  Lab Results   Component Value Date    HEPBSAB >1000.00 01/02/2024    HEPBSAB Reactive 01/02/2024     Lab Results   Component Value Date    HEPAIGG Reactive 12/20/2024     Lab Results   Component Value Date    VARICELLAZOS 883.00 01/02/2024    VARICELLAINT Positive 01/02/2024     Lab Results   Component Value Date    MUMPSIGGSCRE 26.30 01/02/2024    MUMPSIGGINTE Positive 01/02/2024      Lab Results   Component Value Date    RUBEOLAIGGAN 35.60 01/02/2024    RUBEOLAINTER Positive 01/02/2024     Immunization History   Administered Date(s) Administered    COVID-19, MRNA, LN-S, PF (Pfizer) (Purple Cap) 12/21/2020, 01/13/2021, 09/07/2021    Hepatitis A, Adult 02/12/2024, 08/27/2024     Hepatitis B (recombinant) Adjuvanted, 2 dose 12/26/2019, 01/23/2020    Influenza 09/18/2013, 10/25/2022    Influenza - Quadrivalent - PF *Preferred* (6 months and older) 09/18/2013, 10/04/2016, 11/17/2017, 09/13/2018, 10/02/2019, 09/10/2020, 09/24/2021, 10/25/2022, 10/09/2023    Influenza - Trivalent - Fluarix, Flulaval, Fluzone, Afluria - PF 11/11/2024    Influenza Split 09/18/2013    Pneumococcal Conjugate - 13 Valent 06/28/2017    Pneumococcal Conjugate - 20 Valent 01/30/2024    Pneumococcal Polysaccharide - 23 Valent 08/19/2015    Td (ADULT) 06/28/2013    Tdap 12/29/2019    Zoster Recombinant 12/20/2024   Flu shot: recommended yearly, UTD   COVID vaccine/booster:  per CDC recommendations  RSV:  after age 49 yo  Tetanus (Tdap):  due 12/2029  HPV: declined  Meningococcal: NA  Shingrix: discuss and recommend second shingles dose. Schedule before 6/2025    Review of Systems: see pertinent review of systems above    All Medical History/Surgical History/Family History/Social History/Allergies have been reviewed and updated in EMR    Outpatient Medications Marked as Taking for the 4/8/25 encounter (Office Visit) with Peace Garcia MD   Medication Sig Dispense Refill    acetaminophen (TYLENOL) 325 MG tablet Take 2 tablets (650 mg total) by mouth every 6 (six) hours as needed for Temperature greater than (100.5).      amoxicillin-clavulanate 875-125mg (AUGMENTIN) 875-125 mg per tablet Take 1 tablet by mouth every 12 (twelve) hours. for 5 days 10 tablet 0    clonazePAM (KLONOPIN) 1 MG tablet Take 1 tablet (1 mg total) by mouth 2 (two) times daily. 60 tablet 2    cyclobenzaprine (FLEXERIL) 10 MG tablet Take 1 tablet (10 mg total) by mouth every evening as needed for muscle pain 30 tablet 2    droNABinol (MARINOL) 10 MG capsule Take 1 capsule (10 mg total) by mouth once daily. 90 capsule 2    estradiol 0.025 mg/24 hr 0.025 mg/24 hr Place 1 patch onto the skin once a week. CHANGES ON MONDAYS 4 patch 11    gabapentin  "(NEURONTIN) 300 MG capsule Take 1 capsule (300 mg total) by mouth 2 (two) times daily. 60 capsule 4    ibuprofen (ADVIL,MOTRIN) 400 MG tablet Take 400 mg by mouth daily as needed for Other. Takes approximately 3 tabs a day      loperamide (IMODIUM) 1 mg/7.5 mL solution Take 4 mg by mouth 3 (three) times daily as needed for Diarrhea.      loperamide (IMODIUM) 2 mg capsule Take 2 capsules (4 mg total) by mouth 3 (three) times daily as needed for Diarrhea. 84 capsule 2    mirtazapine (REMERON SOL-TAB) 30 MG disintegrating tablet Take 1 tablet (30 mg total) by mouth nightly. 90 tablet 2    multivitamin (THERAGRAN) per tablet Take 1 tablet by mouth once daily.      nadoloL (CORGARD) 40 MG tablet Take 1 tablet (40 mg total) by mouth once daily. Patient needs appointment before next refill. 90 tablet 7    oxyCODONE (ROXICODONE) 5 MG immediate release tablet Take 1 tablet (5 mg total) by mouth every 6 (six) hours as needed for Pain. 20 tablet 0    pantoprazole (PROTONIX) 40 MG tablet Take 1 tablet (40 mg total) by mouth 2 (two) times daily. 60 tablet 12    promethazine (PHENERGAN) 25 MG tablet Take 0.5 tablets (12.5 mg total) by mouth every 6 (six) hours as needed for Nausea. 30 tablet 0    tamsulosin (FLOMAX) 0.4 mg Cap Take 1 capsule (0.4 mg total) by mouth once daily. 30 capsule 1    valACYclovir (VALTREX) 500 MG tablet Take 1 tablet (500 mg total) by mouth once daily. 90 tablet 0    vedolizumab (ENTYVIO) 300 mg SolR injection Inject 300 mg into the vein every 8 weeks.      zolpidem (AMBIEN) 10 mg Tab Take 1 tablet (10 mg total) by mouth every evening. 30 tablet 2     Physical Examination  /75 (BP Location: Left arm, Patient Position: Sitting)   Pulse 69   Temp 97.9 °F (36.6 °C) (Skin)   Ht 5' 1" (1.549 m)   Wt 55.7 kg (122 lb 12.7 oz)   LMP 03/30/2015   SpO2 99%   BMI 23.20 kg/m²    Constitutional: well developed, no cough, no dyspnea, alert, and no acute distress    Eye: Normal conjunctiva  Cardiovascular: " regular rate and regular rhythm, no murmurs  Respiratory: normal air entry, CTA B  Gastrointestinal: soft, diffuse mild tenderness, small midline excision with gauze with yellowish discharge, non-distended, normal BS  Skin: no rash  Psychiatric: appropriate, normal mood    Labs:  Lab Results   Component Value Date    CRP 35.1 (H) 03/31/2025    CALPROTECTIN 70.8 (H) 12/23/2024     Lab Results   Component Value Date    HEPBSAG Non-reactive 12/20/2024    HEPBCAB Non-reactive 12/20/2024     Lab Results   Component Value Date    TBGOLDPLUS Negative 01/09/2025     Lab Results   Component Value Date    BTFPDCAN80QA 41 12/20/2024    TAGNSXUC80 267 12/20/2024     Lab Results   Component Value Date    WBC 7.37 03/31/2025    HGB 11.1 (L) 03/31/2025    HCT 34.9 (L) 03/31/2025    MCV 87 03/31/2025     (H) 03/31/2025     Lab Results   Component Value Date    CREATININE 0.7 03/29/2025    ALBUMIN 3.4 (L) 03/29/2025    BILITOT 0.3 03/29/2025    ALKPHOS 309 (H) 03/29/2025    AST 28 03/29/2025    ALT 21 03/29/2025    GGT 62 (H) 02/18/2025     Assessment/Plan:  Ivy Salgado is a 44 y.o.  with Crohn's disease with end ileostomy and recurrent ileitis, recurrent C diff (2014 x 2), ARPITA/depression, arthritis, h/o abnormal pap smear- LYLA I, nephrolithiasis-nonobstructive, GERD, long QTc syndrome (Type III, on nadolol), s/p SHELLIE (2015) for recurrent ovarian cysts and endometriosis, early melanoma in situ (buttocks and para-stomal site, excised in 2018 and 2019 respectively), history of genital HSV, imuran induced pancreatitis, pancreatic tail cyst, recurrent UTI (h/o ESBL 2018), multiple thyroid nodules, osteopenia, history bacterial vaginosis, h/o abnormal LFTs (elevated ALP since 2016), family history of colon cancer.      Since the last visit patient had recurrent SBO, which resolved with conservative management in the hospital. Followed by a diagnostic laparoscopy converted to open JUAN with new anastomosis created due to  stenosed prior anastomosis. Surgery complicated with readmission due to ileus, but has now resolved. Incision site is healing slowly and pt requiring extensive antibiotic course due to ongoing discharge and tenderness, therefore, will delay entyvio by 2 weeks then resume original schedule in June. Will repeat labs with June infusion and plan for a repeat ileoscopy 6 months post surgery to reassess for any disease recurrence. Continue to follow up with CRS and hepatology.     # Crohn's disease with end ileostomy and recurrent ileitis:  - high ileostomy output- resolved, no longer requiring IVFs, continues liquid imodium as needed  - note melanoma, genital HSV- on valtrex prophylaxis, h/o endometriosis, recurrent UTIs  - continue entyvio q 8 weeks - will delay upcoming dose by 2 weeks (to 4/24) then keep following doses on original schedule   - stop ibuprofen if able to tolerate   - ileoscopy through stoma- 9/2025 - ok with Dr. Ross or Dr. Garcia  - stool calprotectin - improved/ borderline in 12/2025  - Vitamin B12: borderline low in 12/2024 - start vitamin B12 1000mcg SL daily OTC  - drug monitoring labs: CBC/CMP q 6 mos (6/2025), TPMT (normal metabolizer 1/2024), TB quantiferon (12/2025), Hep B testing (12/2025)    # Vaginal yeast infection  - recommended monistat topical OTC to help with current symptoms  - pt deferred at this time, but will consider after she completes the antibiotic course    # Recurrent UTI  - currently inactive   - in past entyvio discontinued due to UTIs; 4 episodes of UTI in last 5 months  - pt understands risk vs. benefits of continuing entyvio, overall I do not believe this is related to the entyvio  - recent cystoscopy did not show fistula   - did not tolerate hiprex as recommended per ID  - continue to follow up with ID and urology     # Elevated liver tests (AST/ALT, ALP)  - AST/ALT now resolved with serologic w/u 9/2023 acute hep panel neg, 11/2023 HBsAg, HBcAb/SAURAV/AMA/ASMA neg, iron  studies neg; 1/2024 normal ceruloplasmin A1AT normal  - due to elevated ALP (since 2019) MRCP done with no findings of PSC though pt has family hx PSC  - fibroscan 2/2025 normal   - follow up with hepatology as scheduled     # Pancreatic tail cyst (CT 12/2023, MRI 12/2024)  - seen in pancreas cyst clinic- MRCP 12/2025    # 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 Simon- reminded to make f/u appt   - skin exam yearly- normal 2/2025    # Bone health:  - left hip avascular necrosis on past CT- will see ortho  - osteopenia- has kidney stones so calcium supplements would defer or consult with renal/urology  - vit D normal in 12/2024  - continue MVI daily     # Immunodeficiency due to long term immunosuppressive drug therapy and IBD specific health maintenance:  CRC risk- no risk, TAC  Pap smear- SHELLIE with BSO, defer to GYN  Vaccines- no live vaccines, shingrix #2 in 5/2025 same day as hepatology appt     I personally examined the patient and discussed above plan in collaboration with Tony GillD.      Follow up in about 6 months (around 10/8/2025).    Peace Garcia MD  Department of Gastroenterology  Medical Director, Inflammatory Bowel Disease         [1]   Past Medical History:  Diagnosis Date    Abnormal Pap smear 2007    Alkaline phosphatase elevation- based on lab from 4/9/2019 05/28/2019    Arthritis     Avascular necrosis of bone of hip, left     Bacterial vaginosis     Crohn disease     Depression 08/05/2017    Drug-induced pancreatitis (imuran)     Encounter for blood transfusion     Generalized anxiety disorder     Genital HSV     GERD (gastroesophageal reflux disease)     Hypertension     Ileostomy in place 07/09/2012    Kidney stone     Long QT syndrome     Melanoma in situ (excised-buttocks 2018, para-stomal site 2019)     Moderate episode of recurrent major depressive disorder 02/02/2024    Multiple thyroid nodules  11/27/2018    Osteopenia of multiple sites 01/07/2019    Pancreas cyst (tail, 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)     Recurrent Clostridioides difficile diarrhea     Recurrent UTI 04/03/2013

## 2025-04-09 ENCOUNTER — PATIENT MESSAGE (OUTPATIENT)
Dept: SURGERY | Facility: CLINIC | Age: 45
End: 2025-04-09
Payer: COMMERCIAL

## 2025-04-09 ENCOUNTER — PATIENT MESSAGE (OUTPATIENT)
Dept: WOUND CARE | Facility: CLINIC | Age: 45
End: 2025-04-09
Payer: COMMERCIAL

## 2025-04-09 DIAGNOSIS — K56.609 SBO (SMALL BOWEL OBSTRUCTION): ICD-10-CM

## 2025-04-09 RX ORDER — OXYCODONE HYDROCHLORIDE 5 MG/1
5 TABLET ORAL EVERY 6 HOURS PRN
Qty: 20 TABLET | Refills: 0 | Status: SHIPPED | OUTPATIENT
Start: 2025-04-09

## 2025-04-11 ENCOUNTER — OFFICE VISIT (OUTPATIENT)
Dept: SURGERY | Facility: CLINIC | Age: 45
End: 2025-04-11
Payer: COMMERCIAL

## 2025-04-11 VITALS
WEIGHT: 120.56 LBS | SYSTOLIC BLOOD PRESSURE: 127 MMHG | DIASTOLIC BLOOD PRESSURE: 86 MMHG | HEART RATE: 80 BPM | HEIGHT: 61 IN | BODY MASS INDEX: 22.76 KG/M2

## 2025-04-11 DIAGNOSIS — K50.012 CROHN'S DISEASE OF SMALL INTESTINE WITH INTESTINAL OBSTRUCTION: Primary | ICD-10-CM

## 2025-04-11 PROCEDURE — 99999 PR PBB SHADOW E&M-EST. PATIENT-LVL IV: CPT | Mod: PBBFAC,,, | Performed by: COLON & RECTAL SURGERY

## 2025-04-11 NOTE — PROGRESS NOTES
CRS Office Visit Follow-up  Referring Md:   No referring provider defined for this encounter.    SUBJECTIVE:     Chief Complaint: small bowel obstruction    History of Present Illness:  Patient is a 44 y.o. female presents with recent small bowel obstruction. The patient is a established patient to this practice.     Course is as follows:  PMHx of Crohn's with surgical history notable for completion proctocolectomy with end ileostomy 6/2012 with Dr. Parsons.   She was recently admitted for small bowel obstruction in April 2024 and in October 2024.  Most recent episode in October 2024 was more severe than prior episodes and required 5 days to recover    Current Crohn's staging:   MRE 5/17/24: Short-segmented stricture with signs of mild active inflammation adjacent to the right lower quadrant surgical anastomosis. Tethering and distortion makes an underlying fistula difficult to exclude.   Ileoscopy: 6/4/24  Impression:            - Patent end ileostomy with healthy appearing  mucosa in the terminal ileum.                          - The examined portion of the ileum was normal.  Biopsied.   Medication: Entyvio    7/19/2020: Laparoscopic lysis of adhesions   5/30/2019: Exploratory laparotomy, lysis of adhesions, bilateral salpingo-oophorectomy/mass resection   4/16/2015:  Total abdominal hysterectomy, right salpingo-oophorectomy, repair of the bladder adhesiolysis.  The left tube and ovary could not be identified.  6/12/2012: open completion proctocolectomy with end ileostomy  Prior to 2012: ileocolic resection.   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  She continues to have ileostomy output.  Occasional bloating.  Due to the ongoing pain and concern for ongoing obstruction, she presents for evaluation    2/28/25:  "presents with recurrent SBO x 3 over the past year.  Most recently hospitalized 1/27/25 to 1/29/25.  She presents for preoperative discussion regarding lysis of adhesions and possible small bowel resection.  She is on a predominantly liquid diet with intermittent obstructive symptoms.    3/19/2025:   Diagnostic laparoscopy  Open extensive lysis of adhesions  Small bowel resection    Pathology:   SMALL BOWEL, PARTIAL RESECTION:   - Chronic active enteritis with ulceration and granulation tissue associated with prior anastomosis   - No evidence of granuloma formation   - No evidence of dysplasia   - Viable tissue at resection margins     Readmitted post op for ileus that resolved on its own.     4/11/2025:  Presents for evaluation.  Eating.  Very low-grade fevers.  Having ostomy output.  Having some clear drainage from her midline incision.    Review of Systems:  Review of Systems   Constitutional:  Negative for chills, diaphoresis, fever, malaise/fatigue and weight loss.   HENT:  Negative for congestion.    Respiratory:  Negative for shortness of breath.    Cardiovascular:  Negative for chest pain and leg swelling.   Gastrointestinal:  Positive for abdominal pain and diarrhea. Negative for blood in stool, constipation, nausea and vomiting.   Genitourinary:  Negative for dysuria.   Musculoskeletal:  Negative for back pain and myalgias.   Skin:  Negative for rash.   Neurological:  Negative for dizziness and weakness.   Endo/Heme/Allergies:  Does not bruise/bleed easily.   Psychiatric/Behavioral:  Negative for depression.        OBJECTIVE:     Vital Signs (Most Recent)  /86   Pulse 80   Ht 5' 1" (1.549 m)   Wt 54.7 kg (120 lb 9.5 oz)   LMP 03/30/2015   BMI 22.79 kg/m²     Physical Exam:  General:  or  female in no distress   Neuro: alert and oriented x 4.  Moves all extremities.     HEENT: no icterus.  Trachea midline  Respiratory: respirations are even and unlabored  Cardiac: " regular rate  Abdomen:  Multiple incisions well healed.  Soft.  Ostomy healthy-appearing.  Midline incision with slight skin separation inferiorly.  Small amount of induration at the mid aspect.  No overlying skin changes.  Seroma aspiration was attempted but no significant fluid collection drained.  Dressings applied.  Extremities: Warm dry and intact  Skin: no rashes  Anorectal:  Deferred    Labs:  H&H 12 and 38.  Normal CRP.  Normal renal function.    Imaging: as above    MRE 11/07/2024: Active Inflammatory CD without luminal Narrowing   ASSESSMENT/PLAN:     Diagnoses and all orders for this visit:    Crohn's disease of small intestine with intestinal obstruction          44 y.o. female with Crohn's disease with multiple pelvic adhesions who has previously undergone total proctocolectomy with end ileostomy.  She has had multiple prior pelvic operations including hysterectomy as well as salpingo-oophorectomy and lysis of adhesions.  Last abdominal surgery in 2020.  She presents with recurrent obstructive symptoms.  Last MRE in April with mild inflammation.  MRE and November 24 with active inflammation.  She was evaluated by GI and felt that they had maximally medically managed her.    She appears to have recurrent obstructive symptoms secondary to adhesive disease.  Most recent CT scan with concern for obstruction at the level of her prior small bowel resection.      She underwent laparoscopic to open extensive lysis of adhesions and small bowel resection on 3/19/25.      She overall is doing well.  Recommended allowing her incision to drain.  I will see her back in 4 weeks for interval evaluation.  Agree with holding her Crohn's medications for an additional 2 weeks.    DUNCAN Campbell MD, FACS, FASCRS  Staff Surgeon  Colon & Rectal Surgery

## 2025-04-14 ENCOUNTER — PATIENT MESSAGE (OUTPATIENT)
Dept: INTERNAL MEDICINE | Facility: CLINIC | Age: 45
End: 2025-04-14
Payer: COMMERCIAL

## 2025-04-14 DIAGNOSIS — F41.1 GENERALIZED ANXIETY DISORDER: ICD-10-CM

## 2025-04-14 DIAGNOSIS — K56.609 SBO (SMALL BOWEL OBSTRUCTION): ICD-10-CM

## 2025-04-14 RX ORDER — CLONAZEPAM 1 MG/1
1 TABLET ORAL 2 TIMES DAILY
Qty: 60 TABLET | Refills: 2 | Status: CANCELLED | OUTPATIENT
Start: 2025-04-14

## 2025-04-14 RX ORDER — OXYCODONE HYDROCHLORIDE 5 MG/1
5 TABLET ORAL EVERY 6 HOURS PRN
Qty: 20 TABLET | Refills: 0 | Status: CANCELLED | OUTPATIENT
Start: 2025-04-14

## 2025-04-14 RX ORDER — OXYCODONE HYDROCHLORIDE 5 MG/1
5 TABLET ORAL EVERY 8 HOURS PRN
Qty: 20 TABLET | Refills: 0 | Status: SHIPPED | OUTPATIENT
Start: 2025-04-14

## 2025-04-14 NOTE — TELEPHONE ENCOUNTER
No care due was identified.  Health [facility] Embedded Care Due Messages. Reference number: [id number].   4/14/2025 1:42:55 PM CDT

## 2025-04-14 NOTE — TELEPHONE ENCOUNTER
Refill Encounter  Allergies: Confirmed    PCP Visits: Recent Visits  Date Type Provider Dept   11/20/24 Office Visit Chano, Luis OCHOA DO [facility] Internal Medicine   Showing recent visits within past 360 days and meeting all other requirements  Future Appointments  No visits were found meeting these conditions.  Showing future appointments within next 720 days and meeting all other requirements     Last 3 Blood Pressure:   BP Readings from Last 3 Encounters:   04/11/25 127/86   04/08/25 108/75   03/31/25 124/73     Preferred Pharmacy:   [facility] [address] [place] [address]  Phone: [phone] Fax: [phone]    Requested RX:  Requested Prescriptions     Pending Prescriptions Disp Refills    clonazePAM (KLONOPIN) 1 MG tablet 60 tablet 2     Sig: Take 1 tablet (1 mg total) by mouth 2 (two) times daily.      RX Route: Normal     No

## 2025-04-14 NOTE — TELEPHONE ENCOUNTER
No care due was identified.  Health [facility] Embedded Care Due Messages. Reference number: [id number].   4/14/2025 10:57:46 AM CDT

## 2025-04-14 NOTE — TELEPHONE ENCOUNTER
Refill Encounter    PCP Visits: Recent Visits  Date Type Provider Dept   11/20/24 Office Visit Luis Madden DO [facility] Internal Medicine   Showing recent visits within past 360 days and meeting all other requirements  Future Appointments  No visits were found meeting these conditions.  Showing future appointments within next 720 days and meeting all other requirements      Last 3 Blood Pressure:   BP Readings from Last 3 Encounters:   04/11/25 127/86   04/08/25 108/75   03/31/25 124/73     Preferred Pharmacy:   [facility] [address] [place] [address]  Phone: [phone] Fax: [phone]    Requested RX:  Requested Prescriptions     Pending Prescriptions Disp Refills    clonazePAM (KLONOPIN) 1 MG tablet 60 tablet 2     Sig: Take 1 tablet (1 mg total) by mouth 2 (two) times daily.      RX Route: Normal

## 2025-04-15 ENCOUNTER — PATIENT MESSAGE (OUTPATIENT)
Dept: GASTROENTEROLOGY | Facility: CLINIC | Age: 45
End: 2025-04-15

## 2025-04-15 ENCOUNTER — CLINICAL SUPPORT (OUTPATIENT)
Dept: GASTROENTEROLOGY | Facility: CLINIC | Age: 45
End: 2025-04-15
Payer: COMMERCIAL

## 2025-04-15 DIAGNOSIS — K50.018 CROHN'S DISEASE OF SMALL INTESTINE WITH OTHER COMPLICATION: Primary | ICD-10-CM

## 2025-04-15 DIAGNOSIS — Z79.60 IMMUNODEFICIENCY DUE TO LONG TERM IMMUNOSUPPRESSIVE DRUG THERAPY: ICD-10-CM

## 2025-04-15 DIAGNOSIS — D84.821 IMMUNODEFICIENCY DUE TO LONG TERM IMMUNOSUPPRESSIVE DRUG THERAPY: ICD-10-CM

## 2025-04-15 DIAGNOSIS — T45.1X5A IMMUNODEFICIENCY DUE TO LONG TERM IMMUNOSUPPRESSIVE DRUG THERAPY: ICD-10-CM

## 2025-04-15 PROCEDURE — 90471 IMMUNIZATION ADMIN: CPT | Mod: S$GLB,,, | Performed by: INTERNAL MEDICINE

## 2025-04-15 PROCEDURE — 90750 HZV VACC RECOMBINANT IM: CPT | Mod: S$GLB,,, | Performed by: INTERNAL MEDICINE

## 2025-04-15 RX ORDER — CLONAZEPAM 1 MG/1
1 TABLET ORAL 2 TIMES DAILY
Qty: 60 TABLET | Refills: 2 | Status: SHIPPED | OUTPATIENT
Start: 2025-04-15

## 2025-04-15 NOTE — PROGRESS NOTES
Ivy presented ambulatory. Reviewed questionnaire specific to Shingrix vaccine, no positive exclusions noted. Vaccination given without incidence. She was escorted ambulatory to the Waiting Area and instructed to wait for 15 min and may leave as long as no adverse reaction noted. She states understanding and agrees.

## 2025-04-21 ENCOUNTER — PATIENT MESSAGE (OUTPATIENT)
Dept: GASTROENTEROLOGY | Facility: CLINIC | Age: 45
End: 2025-04-21
Payer: COMMERCIAL

## 2025-04-21 DIAGNOSIS — K56.609 SBO (SMALL BOWEL OBSTRUCTION): ICD-10-CM

## 2025-04-21 RX ORDER — OXYCODONE HYDROCHLORIDE 5 MG/1
5 TABLET ORAL EVERY 8 HOURS PRN
Qty: 20 TABLET | Refills: 0 | Status: SHIPPED | OUTPATIENT
Start: 2025-04-21

## 2025-04-22 DIAGNOSIS — K50.018 CROHN'S DISEASE OF SMALL INTESTINE WITH OTHER COMPLICATION: ICD-10-CM

## 2025-04-22 RX ORDER — PROMETHAZINE HYDROCHLORIDE 25 MG/1
12.5 TABLET ORAL EVERY 6 HOURS PRN
Qty: 30 TABLET | Refills: 5 | Status: SHIPPED | OUTPATIENT
Start: 2025-04-22

## 2025-04-23 ENCOUNTER — PATIENT MESSAGE (OUTPATIENT)
Dept: GASTROENTEROLOGY | Facility: CLINIC | Age: 45
End: 2025-04-23
Payer: COMMERCIAL

## 2025-04-30 ENCOUNTER — PATIENT MESSAGE (OUTPATIENT)
Dept: SURGERY | Facility: CLINIC | Age: 45
End: 2025-04-30
Payer: COMMERCIAL

## 2025-04-30 ENCOUNTER — PATIENT MESSAGE (OUTPATIENT)
Dept: INTERNAL MEDICINE | Facility: CLINIC | Age: 45
End: 2025-04-30
Payer: COMMERCIAL

## 2025-05-01 DIAGNOSIS — K56.609 SBO (SMALL BOWEL OBSTRUCTION): ICD-10-CM

## 2025-05-01 RX ORDER — OXYCODONE HYDROCHLORIDE 5 MG/1
5 TABLET ORAL EVERY 6 HOURS PRN
Qty: 42 TABLET | Refills: 0 | Status: SHIPPED | OUTPATIENT
Start: 2025-05-01 | End: 2025-05-12

## 2025-05-05 NOTE — PROGRESS NOTES
CRS Office Visit Follow-up  Referring Md:   No referring provider defined for this encounter.    SUBJECTIVE:     Chief Complaint: small bowel obstruction    History of Present Illness:  Patient is a 44 y.o. female presents with recent small bowel obstruction. The patient is a established patient to this practice.     Course is as follows:  PMHx of Crohn's with surgical history notable for completion proctocolectomy with end ileostomy 6/2012 with Dr. Parsons.   She was recently admitted for small bowel obstruction in April 2024 and in October 2024.  Most recent episode in October 2024 was more severe than prior episodes and required 5 days to recover    Current Crohn's staging:   MRE 5/17/24: Short-segmented stricture with signs of mild active inflammation adjacent to the right lower quadrant surgical anastomosis. Tethering and distortion makes an underlying fistula difficult to exclude.   Ileoscopy: 6/4/24  Impression:            - Patent end ileostomy with healthy appearing  mucosa in the terminal ileum.                          - The examined portion of the ileum was normal.  Biopsied.   Medication: Entyvio    7/19/2020: Laparoscopic lysis of adhesions   5/30/2019: Exploratory laparotomy, lysis of adhesions, bilateral salpingo-oophorectomy/mass resection   4/16/2015:  Total abdominal hysterectomy, right salpingo-oophorectomy, repair of the bladder adhesiolysis.  The left tube and ovary could not be identified.  6/12/2012: open completion proctocolectomy with end ileostomy  Prior to 2012: ileocolic resection.   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  She continues to have ileostomy output.  Occasional bloating.  Due to the ongoing pain and concern for ongoing obstruction, she presents for evaluation    2/28/25:  "presents with recurrent SBO x 3 over the past year.  Most recently hospitalized 1/27/25 to 1/29/25.  She presents for preoperative discussion regarding lysis of adhesions and possible small bowel resection.  She is on a predominantly liquid diet with intermittent obstructive symptoms.    3/19/2025:   Diagnostic laparoscopy  Open extensive lysis of adhesions  Small bowel resection    Pathology:   SMALL BOWEL, PARTIAL RESECTION:   - Chronic active enteritis with ulceration and granulation tissue associated with prior anastomosis   - No evidence of granuloma formation   - No evidence of dysplasia   - Viable tissue at resection margins     Readmitted post op for ileus that resolved on its own.     4/11/2025:  Presents for evaluation.  Eating.  Very low-grade fevers.  Having ostomy output.  Having some clear drainage from her midline incision.    5/6/2025: improving.  Back on Entyvio.  Ostomy functioning well.  Midline incision continues to heal.    Review of Systems:  Review of Systems   Constitutional:  Negative for chills, diaphoresis, fever, malaise/fatigue and weight loss.   HENT:  Negative for congestion.    Respiratory:  Negative for shortness of breath.    Cardiovascular:  Negative for chest pain and leg swelling.   Gastrointestinal:  Positive for abdominal pain and diarrhea. Negative for blood in stool, constipation, nausea and vomiting.   Genitourinary:  Negative for dysuria.   Musculoskeletal:  Negative for back pain and myalgias.   Skin:  Negative for rash.   Neurological:  Negative for dizziness and weakness.   Endo/Heme/Allergies:  Does not bruise/bleed easily.   Psychiatric/Behavioral:  Negative for depression.        OBJECTIVE:     Vital Signs (Most Recent)  /71 (BP Location: Right arm, Patient Position: Sitting)   Pulse 65   Resp 19   Ht 5' 0.98" (1.549 m)   Wt 57.3 kg (126 lb 5.2 oz)   LMP 03/30/2015   BMI 23.88 kg/m²     Physical Exam:  General:  or  female in no " distress   Neuro: alert and oriented x 4.  Moves all extremities.     HEENT: no icterus.  Trachea midline  Respiratory: respirations are even and unlabored  Cardiac: regular rate  Abdomen:  Midline incision now flattening out becoming more soft.  Skin well approximated.  No infection.  No hernia.  Extremities: Warm dry and intact  Skin: no rashes  Anorectal:  Deferred    Labs:  H&H 12 and 38.  Normal CRP.  Normal renal function.    Imaging: as above    MRE 11/07/2024: Active Inflammatory CD without luminal Narrowing   ASSESSMENT/PLAN:     Diagnoses and all orders for this visit:    Crohn's disease of small intestine with intestinal obstruction            44 y.o. female with Crohn's disease with multiple pelvic adhesions who has previously undergone total proctocolectomy with end ileostomy.  She has had multiple prior pelvic operations including hysterectomy as well as salpingo-oophorectomy and lysis of adhesions.  Last abdominal surgery in 2020.  She presents with recurrent obstructive symptoms.  Last MRE in April with mild inflammation.  MRE and November 24 with active inflammation.  She was evaluated by GI and felt that they had maximally medically managed her.    She appears to have recurrent obstructive symptoms secondary to adhesive disease.  Most recent CT scan with concern for obstruction at the level of her prior small bowel resection.      She underwent laparoscopic to open extensive lysis of adhesions and small bowel resection on 3/19/25.      She overall is doing well.  Incision is healing very well.  Recommended applying topical Mederma.  She is welcome to follow up with me with any future concerns or issues.  She will otherwise follow up with the GI IBD team.    DUNCAN Campbell MD, FACS, FASCRS  Staff Surgeon  Colon & Rectal Surgery

## 2025-05-06 ENCOUNTER — OFFICE VISIT (OUTPATIENT)
Dept: SURGERY | Facility: CLINIC | Age: 45
End: 2025-05-06
Payer: COMMERCIAL

## 2025-05-06 VITALS
SYSTOLIC BLOOD PRESSURE: 117 MMHG | BODY MASS INDEX: 23.85 KG/M2 | HEART RATE: 65 BPM | HEIGHT: 61 IN | DIASTOLIC BLOOD PRESSURE: 71 MMHG | WEIGHT: 126.31 LBS | RESPIRATION RATE: 19 BRPM

## 2025-05-06 DIAGNOSIS — K50.012 CROHN'S DISEASE OF SMALL INTESTINE WITH INTESTINAL OBSTRUCTION: Primary | ICD-10-CM

## 2025-05-06 PROCEDURE — 99024 POSTOP FOLLOW-UP VISIT: CPT | Mod: S$GLB,,, | Performed by: COLON & RECTAL SURGERY

## 2025-05-06 PROCEDURE — 3078F DIAST BP <80 MM HG: CPT | Mod: CPTII,S$GLB,, | Performed by: COLON & RECTAL SURGERY

## 2025-05-06 PROCEDURE — 99999 PR PBB SHADOW E&M-EST. PATIENT-LVL III: CPT | Mod: PBBFAC,,, | Performed by: COLON & RECTAL SURGERY

## 2025-05-06 PROCEDURE — 1159F MED LIST DOCD IN RCRD: CPT | Mod: CPTII,S$GLB,, | Performed by: COLON & RECTAL SURGERY

## 2025-05-06 PROCEDURE — 3074F SYST BP LT 130 MM HG: CPT | Mod: CPTII,S$GLB,, | Performed by: COLON & RECTAL SURGERY

## 2025-05-06 PROCEDURE — 1160F RVW MEDS BY RX/DR IN RCRD: CPT | Mod: CPTII,S$GLB,, | Performed by: COLON & RECTAL SURGERY

## 2025-05-07 ENCOUNTER — OUTPATIENT CASE MANAGEMENT (OUTPATIENT)
Dept: ADMINISTRATIVE | Facility: OTHER | Age: 45
End: 2025-05-07
Payer: COMMERCIAL

## 2025-05-15 ENCOUNTER — OUTPATIENT CASE MANAGEMENT (OUTPATIENT)
Dept: ADMINISTRATIVE | Facility: OTHER | Age: 45
End: 2025-05-15
Payer: COMMERCIAL

## 2025-05-27 ENCOUNTER — OFFICE VISIT (OUTPATIENT)
Dept: HEPATOLOGY | Facility: CLINIC | Age: 45
End: 2025-05-27
Payer: COMMERCIAL

## 2025-05-27 DIAGNOSIS — R79.89 ELEVATED LIVER FUNCTION TESTS: ICD-10-CM

## 2025-05-27 DIAGNOSIS — K50.018 CROHN'S DISEASE OF SMALL INTESTINE WITH OTHER COMPLICATION: ICD-10-CM

## 2025-05-27 DIAGNOSIS — Z83.79 FAMILY HISTORY OF LIVER DISEASE: ICD-10-CM

## 2025-05-27 DIAGNOSIS — Z98.890 HISTORY OF LIVER BIOPSY: ICD-10-CM

## 2025-05-27 PROCEDURE — 1159F MED LIST DOCD IN RCRD: CPT | Mod: CPTII,95,, | Performed by: INTERNAL MEDICINE

## 2025-05-27 PROCEDURE — 98006 SYNCH AUDIO-VIDEO EST MOD 30: CPT | Mod: 95,,, | Performed by: INTERNAL MEDICINE

## 2025-05-27 PROCEDURE — 1160F RVW MEDS BY RX/DR IN RCRD: CPT | Mod: CPTII,95,, | Performed by: INTERNAL MEDICINE

## 2025-05-27 RX ORDER — URSODIOL 300 MG/1
300 CAPSULE ORAL 2 TIMES DAILY
Qty: 60 CAPSULE | Refills: 11 | Status: SHIPPED | OUTPATIENT
Start: 2025-05-27 | End: 2026-05-27

## 2025-05-27 NOTE — PROGRESS NOTES
Hepatology Follow Up Note    Referring provider: No ref. provider found  PCP: Luis Madden, DO    The patient location is: [place]  The chief complaint leading to consultation is: elevated ALP    Visit type: audiovisual    Face to Face time with patient: 15 minutes.  30 minutes of total time spent on the encounter, which includes face to face time and non-face to face time preparing to see the patient (eg, review of tests), Obtaining and/or reviewing separately obtained history, Documenting clinical information in the electronic or other health record, Independently interpreting results (not separately reported) and communicating results to the patient/family/caregiver, or Care coordination (not separately reported).     Each patient to whom he or she provides medical services by telemedicine is:  (1) informed of the relationship between the physician and patient and the respective role of any other health care provider with respect to management of the patient; and (2) notified that he or she may decline to receive medical services by telemedicine and may withdraw from such care at any time.    Last seen in clinic on: 2/20/2025    Brief HPI:  Ivy Salgado is a 44 y.o. female with elevated liver enzymes, Crohn's disease s/p total proctocolectomy and end ileostomy (2012), who presents for scheduled follow up.    Interval Events:  - Feel ok. Denies acute complaints.   - Underwent small bowel resection in March 2025. Tolerated surgery well. Now fully recovered and back to work.   - This is the extent of the patient's complaints at this time.    Past Medical History:   Diagnosis Date    Abnormal Pap smear 2007    Alkaline phosphatase elevation- based on lab from 4/9/2019 05/28/2019    Arthritis     Avascular necrosis of bone of hip, left     Bacterial vaginosis     Crohn disease     Depression 08/05/2017    Drug-induced pancreatitis (imuran)     Encounter for blood transfusion     Generalized anxiety  disorder     Genital HSV     GERD (gastroesophageal reflux disease)     Hypertension     Ileostomy in place 07/09/2012    Kidney stone     Long QT syndrome     Melanoma in situ (excised-buttocks 2018, para-stomal site 2019)     Moderate episode of recurrent major depressive disorder 02/02/2024    Multiple thyroid nodules 11/27/2018    Osteopenia of multiple sites 01/07/2019    Pancreas cyst (tail, 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)     Recurrent Clostridioides difficile diarrhea     Recurrent UTI 04/03/2013       Past Surgical History:   Procedure Laterality Date    ABDOMINAL SURGERY      APPENDECTOMY      ARTHROPLASTY OF SHOULDER Left 7/18/2023    Procedure: ARTHROPLASTY, SHOULDER;  Surgeon: Sharon Babb MD;  Location: [facility] OR;  Service: Orthopedics;  Laterality: Left;    AUGMENTATION OF BREAST Bilateral 06/2022    gel implants    BILATERAL SALPINGO-OOPHORECTOMY (BSO) Bilateral 05/30/2019    Procedure: SALPINGO-OOPHORECTOMY, BILATERAL;  Surgeon: Rupa German MD;  Location: [facility] OR 2ND FLR;  Service: OB/GYN;  Laterality: Bilateral;    BLADDER SURGERY      partial cystectomy due to fistula    breast lift      BREAST SURGERY      [facility]      COLON SURGERY      COLONOSCOPY      CYSTOGRAM  12/11/2024    Procedure: CYSTOGRAM;  Surgeon: Lexi Villalba MD;  Location: [facility] OR;  Service: Urology;;    CYSTOSCOPY  09/23/2020    Procedure: CYSTOSCOPY;  Surgeon: Sascha Florentino MD;  Location: [facility] OR 1ST FLR;  Service: Urology;;    CYSTOSCOPY N/A 12/11/2024    Procedure: CYSTOSCOPY;  Surgeon: Lexi Villalba MD;  Location: [facility] OR;  Service: Urology;  Laterality: N/A;    CYSTOSCOPY W/ URETERAL STENT PLACEMENT Left 09/15/2020    Procedure: CYSTOSCOPY, WITH URETERAL STENT INSERTION;  Surgeon: Sascha Florentino MD;  Location: [facility] OR 1ST FLR;  Service: Urology;  Laterality: Left;    CYSTOSCOPY,WITH URETERAL CATHETER INSERTION Bilateral 3/19/2025    Procedure: CYSTOSCOPY,WITH URETERAL CATHETER INSERTION;  Surgeon: Chris Jones MD;   Location: [facility] OR 2ND FLR;  Service: Urology;  Laterality: Bilateral;    DIAGNOSTIC LAPAROSCOPY N/A 07/09/2020    Procedure: LAPAROSCOPY, DIAGNOSTIC;  Surgeon: Gilson El MD;  Location: [facility] OR;  Service: OB/GYN;  Laterality: N/A;    DIAGNOSTIC LAPAROSCOPY N/A 3/19/2025    Procedure: LAPAROSCOPY, DIAGNOSTIC;  Surgeon: DUNCAN Campbell MD;  Location: [facility] OR 2ND FLR;  Service: Colon and Rectal;  Laterality: N/A;    ESOPHAGOGASTRODUODENOSCOPY N/A 1/3/2024    Procedure: EGD (ESOPHAGOGASTRODUODENOSCOPY);  Surgeon: Lily Lind MD;  Location: [facility] ENDO (2ND FLR);  Service: Endoscopy;  Laterality: N/A;    EXCISION OF MELANOMA  07/17/2019    ILEOSCOPY N/A 1/3/2024    Procedure: ILEOSCOPY;  Surgeon: Liyl Lind MD;  Location: [facility] ENDO (2ND FLR);  Service: Endoscopy;  Laterality: N/A;    ILEOSCOPY N/A 1/24/2024    Procedure: ILEOSCOPY;  Surgeon: Lilian Navarro MD;  Location: [facility] ENDO (2ND FLR);  Service: Endoscopy;  Laterality: N/A;  through stoma    ILEOSCOPY N/A 6/4/2024    Procedure: ILEOSCOPY;  Surgeon: Peace Garcia MD;  Location: [facility] ENDO (4TH FLR);  Service: Endoscopy;  Laterality: N/A;  Ref By:,kaleb sent via [place],  received urgent message to reschedule pt from 7/15  to may or june-updated prep instructions sent-   5/29/24- precall complete - ERW    ILEOSCOPY N/A 12/2/2024    Procedure: ILEOSCOPY;  Surgeon: Peace Garcia MD;  Location: [facility] ENDO (4TH FLR);  Service: Endoscopy;  Laterality: N/A;  Ref By:Dr.S.ShahWillard,half miralax.  11/22 lvm for precall-st  11/27/24 attempted precall no answer LVM MARI  11/29-LVM for pre call.  No answe.-JM/ES  11/29-pt lvm confirming appt-KPvt    ILEOSTOMY      INSERTION OF TUNNELED CENTRAL VENOUS CATHETER (CVC) WITH SUBCUTANEOUS PORT Right 9/10/2024    Procedure: INSERTION, SINGLE LUMEN CATHETER WITH PORT, WITH FLUOROSCOPIC GUIDANCE, Rt neck or chest, prefers chest;  Surgeon: Vinh Lloyd MD;  [facility];   Service: General;  Laterality: Right;    LAPAROSCOPIC LYSIS OF ADHESIONS N/A 07/09/2020    Procedure: LYSIS, ADHESIONS, LAPAROSCOPIC;  Surgeon: Gilson El MD;  Location: [facility] OR;  Service: OB/GYN;  Laterality: N/A;    LAPAROSCOPIC LYSIS OF ADHESIONS N/A 3/19/2025    Procedure: LYSIS, ADHESIONS, LAPAROSCOPIC;  Surgeon: DUNCAN Campbell MD;  Location: [facility] OR 2ND FLR;  Service: Colon and Rectal;  Laterality: N/A;    LAPAROSCOPIC RESECTION OF SMALL INTESTINE N/A 3/19/2025    Procedure: EXCISION, SMALL INTESTINE, LAPAROSCOPIC, ERAS low;  Surgeon: DUNCAN Campbell MD;  Location: [facility] OR 2ND FLR;  Service: Colon and Rectal;  Laterality: N/A;    LASER LITHOTRIPSY  09/23/2020    Procedure: LITHOTRIPSY, USING LASER;  Surgeon: Sascha Florentino MD;  Location: [facility] OR 1ST FLR;  Service: Urology;;    LIVER BIOPSY N/A 3/19/2025    Procedure: BIOPSY, LIVER;  Surgeon: DUNCAN Campbell MD;  Location: [facility] OR 2ND FLR;  Service: Colon and Rectal;  Laterality: N/A;    LYSIS OF ADHESIONS N/A 05/30/2019    Procedure: LYSIS, ADHESIONS;  Surgeon: Rupa German MD;  Location: [facility] OR 2ND FLR;  Service: OB/GYN;  Laterality: N/A;    MEDIPORT REMOVAL Left 9/10/2024    Procedure: REMOVAL, CATHETER, CENTRAL VENOUS, TUNNELED, WITH PORT, Left side;  Surgeon: Vinh Lloyd MD;  Location: [facility] OR 2ND FLR;  Service: General;  Laterality: Left;    OOPHORECTOMY Right 04/16/2015    PORTACATH PLACEMENT  02/21/2017    SKIN BIOPSY      SMALL INTESTINE SURGERY      age 18 Y    TOTAL ABDOMINAL HYSTERECTOMY  04/16/2015    TOTAL COLECTOMY      TUBAL LIGATION  06/06/2012    UPPER GASTROINTESTINAL ENDOSCOPY      URETEROSCOPIC REMOVAL OF URETERIC CALCULUS  09/23/2020    Procedure: REMOVAL, CALCULUS, URETER, URETEROSCOPIC;  Surgeon: Sascha Florentino MD;  Location: [facility] OR 1ST FLR;  Service: Urology;;    URETEROSCOPY Left 09/23/2020    Procedure: URETEROSCOPY;  Surgeon: Sascha Florentino MD;  [facility];  Service:  Urology;  Laterality: Left;       Family History   Problem Relation Name Age of Onset    Diabetes Paternal Grandfather Stephen     Hearing loss Paternal Grandmother Viviane     Cancer Maternal Grandfather Philippe         Skin    Skin cancer Maternal Grandfather Philippe     Diabetes Maternal Grandfather Philippe     Heart disease Maternal Grandfather Philippe     Colon cancer Father Milan     Cancer Father Milan         Colon    Liver cancer Father Milan     Hyperlipidemia Father Milan     Hypertension Mother Shaila     Crohn's disease Brother      Endometrial cancer Maternal Aunt      Breast cancer Maternal Cousin  41    Crohn's disease Daughter      Ovarian cancer Neg Hx      Melanoma Neg Hx         Social History[1]    Current Medications[2]    Review of patient's allergies indicates:   Allergen Reactions    Azathioprine sodium Other (See Comments)     Other reaction(s): pancreatitis  Other reaction(s): pancreatitis    Methotrexate analogues Other (See Comments)     leukopenia    Stelara [ustekinumab] Other (See Comments)     Multiple infections    Zofran [ondansetron hcl (pf)] Other (See Comments)     Per patient causes prolong QT    Vancomycin analogues Other (See Comments)     Made her red    Azathioprine      Other reaction(s): Unknown    Methotrexate      Other reaction(s): infection-    Morphine Itching and Other (See Comments)     Other reaction(s): Itching    Zofran [ondansetron hcl]      Other reaction(s): Hives    Bactrim [sulfamethoxazole-trimethoprim] Palpitations    Ciprofloxacin Palpitations            There were no vitals filed for this visit.  There is no height or weight on file to calculate BMI.      Physical Exam  Constitutional:       General: She is not in acute distress.     Appearance: She is not toxic-appearing.   Eyes:      General: No scleral icterus.  Skin:     Coloration: Skin is not jaundiced.   Neurological:      General: No focal deficit present.      Mental Status: She is alert.    Psychiatric:         Behavior: Behavior normal.         Thought Content: Thought content normal.         LABS: I personally reviewed pertinent laboratory findings.    Lab Results   Component Value Date    WBC 7.37 03/31/2025    HGB 11.1 (L) 03/31/2025    HCT 34.9 (L) 03/31/2025    MCV 87 03/31/2025     (H) 03/31/2025       Lab Results   Component Value Date     03/29/2025    K 4.4 03/29/2025     03/29/2025    CO2 22 (L) 03/29/2025    BUN 6 03/29/2025    CREATININE 0.7 03/29/2025    CALCIUM 9.7 03/29/2025    ANIONGAP 11 03/29/2025    ESTGFRAFRICA >60.0 03/24/2022    EGFRNONAA >60.0 03/24/2022       Lab Results   Component Value Date    ALT 21 03/29/2025    AST 28 03/29/2025    GGT 62 (H) 02/18/2025    ALKPHOS 309 (H) 03/29/2025    BILITOT 0.3 03/29/2025       Lab Results   Component Value Date    HEPAIGM Non-reactive 03/04/2024    HEPBIGM Non-reactive 03/04/2024    HEPBCAB Non-reactive 12/20/2024    HEPCAB Non-reactive 01/27/2025       Lab Results   Component Value Date    SMOOTHMUSCAB Negative 1:40 01/02/2024    CERULOPLSM 40.0 01/04/2024        Computed MELD 3.0 unavailable. One or more values for this score either were not found within the given timeframe or did not fit some other criterion.  Computed MELD-Na unavailable. One or more values for this score either were not found within the given timeframe or did not fit some other criterion.       IMAGING: I personally reviewed imaging studies.      Assessment:  Ivy Salgado is a 44 y.o. female with elevated liver enzymes, Crohn's disease s/p total proctocolectomy and end ileostomy (2012), who presents for scheduled follow up.    Per chart review, liver enzymes have been intermittently elevated in a cholestatic pattern (ALP ~2x ULN). Serological workup for chronic liver disease is notable for negative autoimmune serologies, and negative viral hepatitis. ALP isoenzymes points to liver as source of ALP elevation. On MRI abd w/wo con + MRCP  (1/4/24), the liver is mildly enlarged with homogenous background signal, biliary tree is unremarkable, spleen is normal. Repeat MRI abd w/wo contrast (12/4/24) without evidence of biliary pathology. FibroScan (2/20/25) suggested normal hepatic parenchyma.    Intra-op liver biopsy (3/19/25) showed minimal (< 5%) macrovesicular steatosis with rare ballooned hepatocytes, scattered pigmented macrophages, suggestive of prior injury/reparative change, no significant portal or lobular inflammation, fibrosis stage 1 of 4. Biopsy reviewed at [facility] (4/3/25), no evidence of small duct PSC identified.     Etiology of intermittent ALP elevation remains unclear despite extensive workup. Reassuringly, hepatic synthetic function is preserved and there is no evidence of advanced hepatic fibrosis. At this point, recommend empiric trial of ursodiol while monitoring LFTs closely.    1. Elevated liver function tests    2. History of liver biopsy    3. Crohn's disease of ileum with end ileostomy    4. Family history of liver disease      Recommendations:  - LFTs, INR q3 months  - UDCA 300mg BID  - Avoid hepatotoxins    Return to clinic in 9 months, virtual ok.    Natali Soto MD  Staff Physician  Hepatology and Liver Transplant  [facility]  [facility]           [1]   Social History  Tobacco Use    Smoking status: Never     Passive exposure: Past    Smokeless tobacco: Never   Substance Use Topics    Alcohol use: Not Currently     Alcohol/week: 0.0 standard drinks of alcohol    Drug use: No   [2]   Current Outpatient Medications   Medication Sig Dispense Refill    acetaminophen (TYLENOL) 325 MG tablet Take 2 tablets (650 mg total) by mouth every 6 (six) hours as needed for Temperature greater than (100.5).      clonazePAM (KLONOPIN) 1 MG tablet Take 1 tablet (1 mg total) by mouth 2 (two) times daily. 60 tablet 2    cyclobenzaprine (FLEXERIL) 10 MG tablet Take 1 tablet (10 mg  total) by mouth every evening as needed for muscle pain 30 tablet 2    droNABinol (MARINOL) 10 MG capsule Take 1 capsule (10 mg total) by mouth once daily. 90 capsule 2    estradiol 0.025 mg/24 hr 0.025 mg/24 hr Place 1 patch onto the skin once a week. CHANGES ON MONDAYS 4 patch 11    gabapentin (NEURONTIN) 300 MG capsule Take 1 capsule (300 mg total) by mouth 2 (two) times daily. 60 capsule 4    ibuprofen (ADVIL,MOTRIN) 400 MG tablet Take 400 mg by mouth daily as needed for Other. Takes approximately 3 tabs a day      loperamide (IMODIUM) 1 mg/7.5 mL solution Take 4 mg by mouth 3 (three) times daily as needed for Diarrhea.      loperamide (IMODIUM) 2 mg capsule Take 2 capsules (4 mg total) by mouth 3 (three) times daily as needed for Diarrhea. 84 capsule 2    mirtazapine (REMERON SOL-TAB) 30 MG disintegrating tablet Take 1 tablet (30 mg total) by mouth nightly. 90 tablet 2    multivitamin (THERAGRAN) per tablet Take 1 tablet by mouth once daily.      nadoloL (CORGARD) 40 MG tablet Take 1 tablet (40 mg total) by mouth once daily. Patient needs appointment before next refill. 90 tablet 7    pantoprazole (PROTONIX) 40 MG tablet Take 1 tablet (40 mg total) by mouth 2 (two) times daily. 60 tablet 12    promethazine (PHENERGAN) 25 MG tablet Take 0.5 tablets (12.5 mg total) by mouth every 6 (six) hours as needed for Nausea. 30 tablet 5    tamsulosin (FLOMAX) 0.4 mg Cap Take 1 capsule (0.4 mg total) by mouth once daily. 30 capsule 1    ursodioL (ACTIGALL) 300 mg capsule Take 1 capsule (300 mg total) by mouth 2 (two) times daily. 60 capsule 11    valACYclovir (VALTREX) 500 MG tablet Take 1 tablet (500 mg total) by mouth once daily. 90 tablet 0    vedolizumab (ENTYVIO) 300 mg SolR injection Inject 300 mg into the vein every 8 weeks.      zolpidem (AMBIEN) 10 mg Tab Take 1 tablet (10 mg total) by mouth every evening. 30 tablet 2     No current facility-administered medications for this visit.

## 2025-05-28 ENCOUNTER — PATIENT MESSAGE (OUTPATIENT)
Dept: HEPATOLOGY | Facility: CLINIC | Age: 45
End: 2025-05-28
Payer: COMMERCIAL

## 2025-06-02 ENCOUNTER — RESULTS FOLLOW-UP (OUTPATIENT)
Dept: HEPATOLOGY | Facility: CLINIC | Age: 45
End: 2025-06-02

## 2025-06-02 ENCOUNTER — LAB VISIT (OUTPATIENT)
Dept: LAB | Facility: HOSPITAL | Age: 45
End: 2025-06-02
Payer: COMMERCIAL

## 2025-06-02 DIAGNOSIS — Z98.890 HISTORY OF LIVER BIOPSY: ICD-10-CM

## 2025-06-02 DIAGNOSIS — R79.89 ELEVATED LIVER FUNCTION TESTS: ICD-10-CM

## 2025-06-02 LAB
ALBUMIN SERPL BCP-MCNC: 3.8 G/DL (ref 3.5–5.2)
ALP SERPL-CCNC: 140 UNIT/L (ref 40–150)
ALT SERPL W/O P-5'-P-CCNC: 13 UNIT/L (ref 10–44)
AST SERPL-CCNC: 20 UNIT/L (ref 11–45)
BILIRUB DIRECT SERPL-MCNC: <0.1 MG/DL (ref 0.1–0.3)
BILIRUB SERPL-MCNC: 0.2 MG/DL (ref 0.1–1)
INR PPP: 1 (ref 0.8–1.2)
PROT SERPL-MCNC: 7.8 GM/DL (ref 6–8.4)
PROTHROMBIN TIME: 11 SECONDS (ref 9–12.5)

## 2025-06-02 PROCEDURE — 85610 PROTHROMBIN TIME: CPT

## 2025-06-02 PROCEDURE — 80076 HEPATIC FUNCTION PANEL: CPT

## 2025-06-02 PROCEDURE — 36415 COLL VENOUS BLD VENIPUNCTURE: CPT

## 2025-06-06 PROCEDURE — 86140 C-REACTIVE PROTEIN: CPT | Performed by: INTERNAL MEDICINE

## 2025-06-06 PROCEDURE — 82607 VITAMIN B-12: CPT | Performed by: INTERNAL MEDICINE

## 2025-06-06 PROCEDURE — 82947 ASSAY GLUCOSE BLOOD QUANT: CPT | Performed by: INTERNAL MEDICINE

## 2025-06-06 PROCEDURE — 85025 COMPLETE CBC W/AUTO DIFF WBC: CPT | Performed by: INTERNAL MEDICINE

## 2025-06-08 ENCOUNTER — RESULTS FOLLOW-UP (OUTPATIENT)
Dept: GASTROENTEROLOGY | Facility: CLINIC | Age: 45
End: 2025-06-08

## 2025-06-09 ENCOUNTER — PATIENT MESSAGE (OUTPATIENT)
Dept: SURGERY | Facility: CLINIC | Age: 45
End: 2025-06-09
Payer: COMMERCIAL

## 2025-06-09 ENCOUNTER — PATIENT OUTREACH (OUTPATIENT)
Dept: ADMINISTRATIVE | Facility: HOSPITAL | Age: 45
End: 2025-06-09
Payer: COMMERCIAL

## 2025-06-09 ENCOUNTER — PATIENT MESSAGE (OUTPATIENT)
Dept: SPORTS MEDICINE | Facility: CLINIC | Age: 45
End: 2025-06-09
Payer: COMMERCIAL

## 2025-06-09 ENCOUNTER — PATIENT MESSAGE (OUTPATIENT)
Dept: ADMINISTRATIVE | Facility: HOSPITAL | Age: 45
End: 2025-06-09
Payer: COMMERCIAL

## 2025-06-09 VITALS — DIASTOLIC BLOOD PRESSURE: 83 MMHG | SYSTOLIC BLOOD PRESSURE: 129 MMHG

## 2025-06-10 ENCOUNTER — PATIENT MESSAGE (OUTPATIENT)
Dept: INTERNAL MEDICINE | Facility: CLINIC | Age: 45
End: 2025-06-10

## 2025-06-10 ENCOUNTER — E-VISIT (OUTPATIENT)
Dept: INTERNAL MEDICINE | Facility: CLINIC | Age: 45
End: 2025-06-10
Payer: COMMERCIAL

## 2025-06-10 DIAGNOSIS — Z98.890 S/P SHOULDER SURGERY: ICD-10-CM

## 2025-06-10 DIAGNOSIS — M25.512 CHRONIC LEFT SHOULDER PAIN: ICD-10-CM

## 2025-06-10 DIAGNOSIS — F41.9 ANXIETY: ICD-10-CM

## 2025-06-10 DIAGNOSIS — G89.29 CHRONIC LEFT SHOULDER PAIN: ICD-10-CM

## 2025-06-10 DIAGNOSIS — F41.1 GENERALIZED ANXIETY DISORDER: ICD-10-CM

## 2025-06-10 RX ORDER — PANTOPRAZOLE SODIUM 40 MG/1
40 TABLET, DELAYED RELEASE ORAL 2 TIMES DAILY
Qty: 60 TABLET | Refills: 12 | Status: CANCELLED | OUTPATIENT
Start: 2025-06-10

## 2025-06-10 RX ORDER — CYCLOBENZAPRINE HCL 10 MG
10 TABLET ORAL NIGHTLY
Qty: 30 TABLET | Refills: 2 | Status: CANCELLED | OUTPATIENT
Start: 2025-06-10

## 2025-06-10 RX ORDER — ZOLPIDEM TARTRATE 10 MG/1
10 TABLET ORAL NIGHTLY
Qty: 30 TABLET | Refills: 2 | Status: CANCELLED | OUTPATIENT
Start: 2025-06-10

## 2025-06-10 RX ORDER — ZOLPIDEM TARTRATE 10 MG/1
10 TABLET ORAL NIGHTLY
Qty: 30 TABLET | Refills: 0 | Status: SHIPPED | OUTPATIENT
Start: 2025-06-10

## 2025-06-10 NOTE — TELEPHONE ENCOUNTER
Refill Encounter    PCP Visits: Recent Visits  Date Type Provider Dept   11/20/24 Office Visit Luis Madden DO [facility] Internal Medicine   Showing recent visits within past 360 days and meeting all other requirements  Future Appointments  No visits were found meeting these conditions.  Showing future appointments within next 720 days and meeting all other requirements      Last 3 Blood Pressure:   BP Readings from Last 3 Encounters:   06/09/25 129/83   06/06/25 129/86   05/06/25 117/71     Preferred Pharmacy:   [facility] [address] [place] [address]  Phone: [phone] Fax: [phone]      Requested RX:  Requested Prescriptions     Pending Prescriptions Disp Refills    zolpidem (AMBIEN) 10 mg Tab 30 tablet 2     Sig: Take 1 tablet (10 mg total) by mouth every evening.      RX Route: Normal

## 2025-06-10 NOTE — TELEPHONE ENCOUNTER
No care due was identified.  [facility] Embedded Care Due Messages. Reference number: [id number].   6/10/2025 2:54:36 PM CDT

## 2025-06-11 ENCOUNTER — PATIENT MESSAGE (OUTPATIENT)
Dept: GASTROENTEROLOGY | Facility: CLINIC | Age: 45
End: 2025-06-11
Payer: COMMERCIAL

## 2025-06-11 DIAGNOSIS — M25.512 CHRONIC LEFT SHOULDER PAIN: ICD-10-CM

## 2025-06-11 DIAGNOSIS — G89.29 CHRONIC LEFT SHOULDER PAIN: ICD-10-CM

## 2025-06-11 DIAGNOSIS — Z98.890 S/P SHOULDER SURGERY: ICD-10-CM

## 2025-06-11 RX ORDER — CYCLOBENZAPRINE HCL 10 MG
10 TABLET ORAL NIGHTLY
Qty: 30 TABLET | Refills: 2 | Status: SHIPPED | OUTPATIENT
Start: 2025-06-11

## 2025-06-11 RX ORDER — PANTOPRAZOLE SODIUM 40 MG/1
40 TABLET, DELAYED RELEASE ORAL 2 TIMES DAILY
Qty: 60 TABLET | Refills: 12 | Status: SHIPPED | OUTPATIENT
Start: 2025-06-11

## 2025-06-12 ENCOUNTER — OFFICE VISIT (OUTPATIENT)
Dept: OBSTETRICS AND GYNECOLOGY | Facility: CLINIC | Age: 45
End: 2025-06-12
Payer: COMMERCIAL

## 2025-06-12 ENCOUNTER — LAB VISIT (OUTPATIENT)
Dept: LAB | Facility: HOSPITAL | Age: 45
End: 2025-06-12
Attending: STUDENT IN AN ORGANIZED HEALTH CARE EDUCATION/TRAINING PROGRAM
Payer: COMMERCIAL

## 2025-06-12 VITALS
BODY MASS INDEX: 23.29 KG/M2 | SYSTOLIC BLOOD PRESSURE: 124 MMHG | DIASTOLIC BLOOD PRESSURE: 83 MMHG | WEIGHT: 119.25 LBS

## 2025-06-12 DIAGNOSIS — Z01.419 WELL WOMAN EXAM: ICD-10-CM

## 2025-06-12 DIAGNOSIS — Z01.419 WELL WOMAN EXAM: Primary | ICD-10-CM

## 2025-06-12 LAB
HAV IGM SERPL QL IA: NORMAL
HBV CORE IGM SERPL QL IA: NORMAL
HBV SURFACE AG SERPL QL IA: NORMAL
HCV AB SERPL QL IA: NORMAL
HIV 1+2 AB+HIV1 P24 AG SERPL QL IA: NORMAL
T PALLIDUM IGG+IGM SER QL: NORMAL

## 2025-06-12 PROCEDURE — 3074F SYST BP LT 130 MM HG: CPT | Mod: CPTII,S$GLB,, | Performed by: STUDENT IN AN ORGANIZED HEALTH CARE EDUCATION/TRAINING PROGRAM

## 2025-06-12 PROCEDURE — 1160F RVW MEDS BY RX/DR IN RCRD: CPT | Mod: CPTII,S$GLB,, | Performed by: STUDENT IN AN ORGANIZED HEALTH CARE EDUCATION/TRAINING PROGRAM

## 2025-06-12 PROCEDURE — 99396 PREV VISIT EST AGE 40-64: CPT | Mod: S$GLB,,, | Performed by: STUDENT IN AN ORGANIZED HEALTH CARE EDUCATION/TRAINING PROGRAM

## 2025-06-12 PROCEDURE — 87389 HIV-1 AG W/HIV-1&-2 AB AG IA: CPT

## 2025-06-12 PROCEDURE — 99999 PR PBB SHADOW E&M-EST. PATIENT-LVL IV: CPT | Mod: PBBFAC,,, | Performed by: STUDENT IN AN ORGANIZED HEALTH CARE EDUCATION/TRAINING PROGRAM

## 2025-06-12 PROCEDURE — 3008F BODY MASS INDEX DOCD: CPT | Mod: CPTII,S$GLB,, | Performed by: STUDENT IN AN ORGANIZED HEALTH CARE EDUCATION/TRAINING PROGRAM

## 2025-06-12 PROCEDURE — 3079F DIAST BP 80-89 MM HG: CPT | Mod: CPTII,S$GLB,, | Performed by: STUDENT IN AN ORGANIZED HEALTH CARE EDUCATION/TRAINING PROGRAM

## 2025-06-12 PROCEDURE — 80074 ACUTE HEPATITIS PANEL: CPT

## 2025-06-12 PROCEDURE — 81515 NFCT DS BV&VAGINITIS DNA ALG: CPT | Performed by: STUDENT IN AN ORGANIZED HEALTH CARE EDUCATION/TRAINING PROGRAM

## 2025-06-12 PROCEDURE — 87491 CHLMYD TRACH DNA AMP PROBE: CPT | Performed by: STUDENT IN AN ORGANIZED HEALTH CARE EDUCATION/TRAINING PROGRAM

## 2025-06-12 PROCEDURE — 86593 SYPHILIS TEST NON-TREP QUANT: CPT

## 2025-06-12 PROCEDURE — 36415 COLL VENOUS BLD VENIPUNCTURE: CPT

## 2025-06-12 PROCEDURE — 1159F MED LIST DOCD IN RCRD: CPT | Mod: CPTII,S$GLB,, | Performed by: STUDENT IN AN ORGANIZED HEALTH CARE EDUCATION/TRAINING PROGRAM

## 2025-06-12 NOTE — PROGRESS NOTES
History & Physical  Gynecology      SUBJECTIVE:     Chief Complaint: Annual Exam       History of Present Illness:  44 y.o. female  here for annual GYN visit.   Patient has a complex medical and surgical history significant for Crohn's disease with multiple prior intestinal procedures with resultant total colectomy and end ileostomy in . She also underwent a SHELLIE/RSO in  complicated by incidental cystotomy. Then in  she underwent an xlap/BSO/extensive JUAN with Dr. German. According to the patient she has intermittent ovarian reminant syndrome.   She denies vaginal itching, irritation, or discharge.  Patient is sexually active with one partner and requests STD testing.  She does not use tobacco, alcohol or drugs.  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 Lives with her daughter and reports feeling safe at home.      Patient has no history of abnormal paps, per report  Last MM/24 BIRADS - 2     Patient's maternal cousin and had breast cancer and her maternal aunt had endometrial cancer.     Review of patient's allergies indicates:   Allergen Reactions    Azathioprine sodium Other (See Comments)     Other reaction(s): pancreatitis  Other reaction(s): pancreatitis    Methotrexate analogues Other (See Comments)     leukopenia    Stelara [ustekinumab] Other (See Comments)     Multiple infections    Zofran [ondansetron hcl (pf)] Other (See Comments)     Per patient causes prolong QT    Vancomycin analogues Other (See Comments)     Made her red    Azathioprine      Other reaction(s): Unknown    Methotrexate      Other reaction(s): infection-    Morphine Itching and Other (See Comments)     Other reaction(s): Itching    Zofran [ondansetron hcl]      Other reaction(s): Hives    Bactrim [sulfamethoxazole-trimethoprim] Palpitations    Ciprofloxacin Palpitations       Past Medical History:   Diagnosis Date    Abnormal Pap smear     Alkaline phosphatase elevation- based on lab from 2019     Arthritis     Avascular necrosis of bone of hip, left     Bacterial vaginosis     Crohn disease     Depression 08/05/2017    Drug-induced pancreatitis (imuran)     Encounter for blood transfusion     Generalized anxiety disorder     Genital HSV     GERD (gastroesophageal reflux disease)     Hypertension     Ileostomy in place 07/09/2012    Kidney stone     Long QT syndrome     Melanoma in situ (excised-buttocks 2018, para-stomal site 2019)     Moderate episode of recurrent major depressive disorder 02/02/2024    Multiple thyroid nodules 11/27/2018    Osteopenia of multiple sites 01/07/2019    Pancreas cyst (tail, 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)     Recurrent Clostridioides difficile diarrhea     Recurrent UTI 04/03/2013     Past Surgical History:   Procedure Laterality Date    ABDOMINAL SURGERY      APPENDECTOMY      ARTHROPLASTY OF SHOULDER Left 7/18/2023    Procedure: ARTHROPLASTY, SHOULDER;  Surgeon: Sharon Babb MD;  Location: [facility] OR;  Service: Orthopedics;  Laterality: Left;    AUGMENTATION OF BREAST Bilateral 06/2022    gel implants    BILATERAL SALPINGO-OOPHORECTOMY (BSO) Bilateral 05/30/2019    Procedure: SALPINGO-OOPHORECTOMY, BILATERAL;  Surgeon: Rupa German MD;  Location: [facility] OR 2ND FLR;  Service: OB/GYN;  Laterality: Bilateral;    BLADDER SURGERY      partial cystectomy due to fistula    breast lift      BREAST SURGERY      [facility]      COLON SURGERY      COLONOSCOPY      CYSTOGRAM  12/11/2024    Procedure: CYSTOGRAM;  Surgeon: Lexi Villalba MD;  Location: [facility] OR;  Service: Urology;;    CYSTOSCOPY  09/23/2020    Procedure: CYSTOSCOPY;  Surgeon: Sascha Florentino MD;  Location: [facility] OR 1ST FLR;  Service: Urology;;    CYSTOSCOPY N/A 12/11/2024    Procedure: CYSTOSCOPY;  Surgeon: Lexi Villalba MD;  Location: [facility] OR;  Service: Urology;  Laterality: N/A;    CYSTOSCOPY W/ URETERAL STENT PLACEMENT Left 09/15/2020    Procedure: CYSTOSCOPY, WITH URETERAL STENT INSERTION;  Surgeon: Sascha Florentino MD;   Location: [facility] OR 1ST FLR;  Service: Urology;  Laterality: Left;    CYSTOSCOPY,WITH URETERAL CATHETER INSERTION Bilateral 3/19/2025    Procedure: CYSTOSCOPY,WITH URETERAL CATHETER INSERTION;  Surgeon: Chris Jones MD;  Location: [facility] OR 2ND FLR;  Service: Urology;  Laterality: Bilateral;    DIAGNOSTIC LAPAROSCOPY N/A 07/09/2020    Procedure: LAPAROSCOPY, DIAGNOSTIC;  Surgeon: Gilson El MD;  Location: [facility] OR;  Service: OB/GYN;  Laterality: N/A;    DIAGNOSTIC LAPAROSCOPY N/A 3/19/2025    Procedure: LAPAROSCOPY, DIAGNOSTIC;  Surgeon: DUNCAN Campbell MD;  Location: [facility] OR 2ND FLR;  Service: Colon and Rectal;  Laterality: N/A;    ESOPHAGOGASTRODUODENOSCOPY N/A 1/3/2024    Procedure: EGD (ESOPHAGOGASTRODUODENOSCOPY);  Surgeon: Lliy Lind MD;  Location: [facility] ENDO (2ND FLR);  Service: Endoscopy;  Laterality: N/A;    EXCISION OF MELANOMA  07/17/2019    ILEOSCOPY N/A 1/3/2024    Procedure: ILEOSCOPY;  Surgeon: Lily Lind MD;  Location: [facility] ENDO (2ND FLR);  Service: Endoscopy;  Laterality: N/A;    ILEOSCOPY N/A 1/24/2024    Procedure: ILEOSCOPY;  Surgeon: Lilian Navarro MD;  Location: [facility] ENDO (2ND FLR);  Service: Endoscopy;  Laterality: N/A;  through stoma    ILEOSCOPY N/A 6/4/2024    Procedure: ILEOSCOPY;  Surgeon: Peace Garcia MD;  Location: [facility] ENDO (4TH FLR);  Service: Endoscopy;  Laterality: N/A;  Ref By:kaleb Stallworth sent via [place],  received urgent message to reschedule pt from 7/15  to may or june-updated prep instructions sent-   5/29/24- precall complete - ERW    ILEOSCOPY N/A 12/2/2024    Procedure: ILEOSCOPY;  Surgeon: Peace Garcia MD;  Location: [facility] ENDO (4TH FLR);  Service: Endoscopy;  Laterality: N/A;  Ref By:lucy Stallworth,half miralax.  11/22 lvm for precall-st  11/27/24 attempted precall no answer LVM MARI  11/29-LVM for pre call.  No answe.-RIKKI/CANDIDO  11/29-pt lvm confirming appt-KPvt    ILEOSTOMY      INSERTION OF TUNNELED CENTRAL VENOUS CATHETER  (CVC) WITH SUBCUTANEOUS PORT Right 9/10/2024    Procedure: INSERTION, SINGLE LUMEN CATHETER WITH PORT, WITH FLUOROSCOPIC GUIDANCE, Rt neck or chest, prefers chest;  Surgeon: Vinh Lloyd MD;  Location: [facility] OR 2ND FLR;  Service: General;  Laterality: Right;    LAPAROSCOPIC LYSIS OF ADHESIONS N/A 07/09/2020    Procedure: LYSIS, ADHESIONS, LAPAROSCOPIC;  Surgeon: Gilson El MD;  Location: [facility] OR;  Service: OB/GYN;  Laterality: N/A;    LAPAROSCOPIC LYSIS OF ADHESIONS N/A 3/19/2025    Procedure: LYSIS, ADHESIONS, LAPAROSCOPIC;  Surgeon: DUNCAN Campbell MD;  Location: [facility] OR 2ND FLR;  Service: Colon and Rectal;  Laterality: N/A;    LAPAROSCOPIC RESECTION OF SMALL INTESTINE N/A 3/19/2025    Procedure: EXCISION, SMALL INTESTINE, LAPAROSCOPIC, ERAS low;  Surgeon: DUNCAN Campbell MD;  Location: [facility] OR 2ND FLR;  Service: Colon and Rectal;  Laterality: N/A;    LASER LITHOTRIPSY  09/23/2020    Procedure: LITHOTRIPSY, USING LASER;  Surgeon: Sascha Florentino MD;  Location: [facility] OR 1ST FLR;  Service: Urology;;    LIVER BIOPSY N/A 3/19/2025    Procedure: BIOPSY, LIVER;  Surgeon: DUNCAN Campbell MD;  Location: [facility] OR 2ND FLR;  Service: Colon and Rectal;  Laterality: N/A;    LYSIS OF ADHESIONS N/A 05/30/2019    Procedure: LYSIS, ADHESIONS;  Surgeon: Rupa German MD;  Location: [facility] OR 2ND FLR;  Service: OB/GYN;  Laterality: N/A;    MEDIPORT REMOVAL Left 9/10/2024    Procedure: REMOVAL, CATHETER, CENTRAL VENOUS, TUNNELED, WITH PORT, Left side;  Surgeon: Vinh Llyod MD;  Location: [facility] OR 2ND FLR;  Service: General;  Laterality: Left;    OOPHORECTOMY Right 04/16/2015    PORTACATH PLACEMENT  02/21/2017    SKIN BIOPSY      SMALL INTESTINE SURGERY      age 18 Y    TOTAL ABDOMINAL HYSTERECTOMY  04/16/2015    TOTAL COLECTOMY      TUBAL LIGATION  06/06/2012    UPPER GASTROINTESTINAL ENDOSCOPY      URETEROSCOPIC REMOVAL OF URETERIC CALCULUS  09/23/2020    Procedure: REMOVAL, CALCULUS, URETER,  URETEROSCOPIC;  Surgeon: Sascha Florentino MD;  Location: [facility] OR [address] [place] [address];  Service: Urology;;    URETEROSCOPY Left 2020    Procedure: URETEROSCOPY;  Surgeon: Sacsha Florentino MD;  [facility];  Service: Urology;  Laterality: Left;     OB History          2    Para   1    Term   1       0    AB   1    Living   1         SAB   1    IAB   0    Ectopic   0    Multiple   0    Live Births   1               Family History   Problem Relation Name Age of Onset    Diabetes Paternal Grandfather Stephen     Hearing loss Paternal Grandmother Viviane     Cancer Maternal Grandfather Philippe         Skin    Skin cancer Maternal Grandfather Philippe     Diabetes Maternal Grandfather Philippe     Heart disease Maternal Grandfather Philippe     Colon cancer Father Milan     Cancer Father Milan         Colon    Liver cancer Father Milan     Hyperlipidemia Father Milan     Hypertension Mother Shaila     Crohn's disease Brother      Endometrial cancer Maternal Aunt      Breast cancer Maternal Cousin  41    Crohn's disease Daughter      Ovarian cancer Neg Hx      Melanoma Neg Hx       Social History[1]    Current Outpatient Medications   Medication Sig    acetaminophen (TYLENOL) 325 MG tablet Take 2 tablets (650 mg total) by mouth every 6 (six) hours as needed for Temperature greater than (100.5).    clonazePAM (KLONOPIN) 1 MG tablet Take 1 tablet (1 mg total) by mouth 2 (two) times daily.    cyclobenzaprine (FLEXERIL) 10 MG tablet Take 1 tablet (10 mg total) by mouth every evening as needed for muscle pain    droNABinol (MARINOL) 10 MG capsule Take 1 capsule (10 mg total) by mouth once daily.    estradiol 0.025 mg/24 hr 0.025 mg/24 hr Place 1 patch onto the skin once a week. CHANGES ON     gabapentin (NEURONTIN) 300 MG capsule Take 1 capsule (300 mg total) by mouth 2 (two) times daily.    ibuprofen (ADVIL,MOTRIN) 400 MG tablet Take 400 mg by mouth daily as needed for Other. Takes  approximately 3 tabs a day    loperamide (IMODIUM) 1 mg/7.5 mL solution Take 4 mg by mouth 3 (three) times daily as needed for Diarrhea.    loperamide (IMODIUM) 2 mg capsule Take 2 capsules (4 mg total) by mouth 3 (three) times daily as needed for Diarrhea.    mirtazapine (REMERON SOL-TAB) 30 MG disintegrating tablet Take 1 tablet (30 mg total) by mouth nightly.    multivitamin (THERAGRAN) per tablet Take 1 tablet by mouth once daily.    nadoloL (CORGARD) 40 MG tablet Take 1 tablet (40 mg total) by mouth once daily. Patient needs appointment before next refill.    pantoprazole (PROTONIX) 40 MG tablet Take 1 tablet (40 mg total) by mouth 2 (two) times daily.    promethazine (PHENERGAN) 25 MG tablet Take 0.5 tablets (12.5 mg total) by mouth every 6 (six) hours as needed for Nausea.    ursodioL (ACTIGALL) 300 mg capsule Take 1 capsule (300 mg total) by mouth 2 (two) times daily.    valACYclovir (VALTREX) 500 MG tablet Take 1 tablet (500 mg total) by mouth once daily.    vedolizumab (ENTYVIO) 300 mg SolR injection Inject 300 mg into the vein every 8 weeks.    zolpidem (AMBIEN) 10 mg Tab Take 1 tablet (10 mg total) by mouth every evening.    tamsulosin (FLOMAX) 0.4 mg Cap Take 1 capsule (0.4 mg total) by mouth once daily.     No current facility-administered medications for this visit.       Review of Systems:  Review of Systems   Constitutional:  Negative for chills, fatigue and fever.   HENT:  Negative for congestion.    Eyes:  Negative for visual disturbance.   Respiratory:  Negative for cough and shortness of breath.    Cardiovascular:  Negative for chest pain and palpitations.   Gastrointestinal:  Negative for abdominal distention, abdominal pain, constipation, diarrhea, nausea and vomiting.   Genitourinary:  Negative for difficulty urinating, dysuria, hematuria, vaginal bleeding and vaginal discharge.   Skin:  Negative for rash.   Neurological:  Negative for dizziness, seizures, light-headedness and headaches.    Hematological:  Does not bruise/bleed easily.   Psychiatric/Behavioral:  Negative for dysphoric mood. The patient is not nervous/anxious.         OBJECTIVE:     Physical Exam:  Vitals:    06/12/25 1114   BP: 124/83      Physical Exam  Vitals and nursing note reviewed. Exam conducted with a chaperone present.   Constitutional:       General: She is not in acute distress.     Appearance: She is well-developed.   HENT:      Head: Normocephalic and atraumatic.   Eyes:      Pupils: Pupils are equal, round, and reactive to light.   Cardiovascular:      Rate and Rhythm: Normal rate and regular rhythm.   Pulmonary:      Effort: Pulmonary effort is normal. No respiratory distress.   Chest:   Breasts:     Right: Normal.      Left: Normal.      Comments: Surgical scarring consistent with breast lift.   Abdominal:      General: There is no distension.      Palpations: Abdomen is soft. There is no mass.      Tenderness: There is no abdominal tenderness. There is no guarding.   Genitourinary:     Comments: SSE: Normal external female genitalia, normal urethral meatus, normal vaginal rugae, normal vaginal mucosa, no vaginal blood in canal, normal physiologic discharge, surgically absent cervix and uterus, no adnexal masses palpated on bimanual exam.   Musculoskeletal:         General: Normal range of motion.      Cervical back: Normal range of motion and neck supple.   Skin:     General: Skin is warm and dry.   Neurological:      Mental Status: She is alert and oriented to person, place, and time.   Psychiatric:         Behavior: Behavior normal.         Thought Content: Thought content normal.         Judgment: Judgment normal.         ASSESSMENT/PLAN:     1. Well woman exam (Primary)  - C. trachomatis/N. gonorrhoeae by AMP DNA  - Vaginosis Screen by DNA Probe  - HIV 1/2 Ag/Ab (4th Gen); Future  - Treponema Pallidium Antibodies IgG, IgM; Future  - Hepatitis Panel, Acute; Future  - Mammo Digital Screening Bilat;  Future      Tawanda Mahajan M.D.  OB/GYN  [facility]           [1]   Social History  Tobacco Use    Smoking status: Never     Passive exposure: Past    Smokeless tobacco: Never   Substance Use Topics    Alcohol use: Not Currently     Alcohol/week: 0.0 standard drinks of alcohol    Drug use: No

## 2025-06-13 LAB
BACTERIAL VAGINOSIS DNA (OHS): NOT DETECTED
C TRACH DNA SPEC QL NAA+PROBE: NOT DETECTED
CANDIDA GLABRATA/KRUSEI DNA (OHS): NOT DETECTED
CANDIDA SPECIES DNA (OHS): DETECTED
CTGC SOURCE (OHS) ORD-325: NORMAL
N GONORRHOEA DNA UR QL NAA+PROBE: NOT DETECTED
TRICHOMONAS VAGINALIS DNA (OHS): NOT DETECTED

## 2025-06-13 RX ORDER — CLONAZEPAM 1 MG/1
1 TABLET ORAL 2 TIMES DAILY
Qty: 60 TABLET | Refills: 2 | Status: SHIPPED | OUTPATIENT
Start: 2025-06-13

## 2025-06-13 NOTE — PROGRESS NOTES
Patient ID: Ivy Salgado is a 44 y.o. female.        E-Visit Time Tracking:             Chief Complaint: General Illness (Entered automatically based on patient selection in [employer]      The patient initiated a request through [employer] on 6/10/2025 for evaluation and management with a chief complaint of General Illness (Entered automatically based on patient selection in [employer]     I evaluated the questionnaire submission on 06/13/2025.    [facility] Evisit [facility]-Chronic Conditions    6/10/2025  4:48 PM CDT - Filed by Patient   What do you need help with? Medication Request   Do you agree to participate in an E-Visit? Yes   If you have any of the following symptoms, please present to your local emergency room or call 911:  I acknowledge   Medication requests for narcotics will not be addressed via an E-Visit.  Please schedule an appointment. I acknowledge   Do you have any of the following pregnancy-related conditions? None   Do you want to address a new or existing medication? Address a current medication   What is the main issue you would like addressed today? Med refill   Would you like to change or continue your medication? Continue medication   What is the name of the medication you would like to continue? Klonopin   Are you taking your medication as prescribed? Yes   Which option below best describes the reason for your request? Renew refills    What medical condition is the  medication intended to treat? Anxiety   Has the medication helped your condition? Yes   Have you experienced any side effects from the medication? No   Provide any additional information you feel is important. Post op also   Please attach any relevant images or files    Are you able to take your vital signs? Yes   Systolic Blood Pressure: 138   Diastolic Blood Pressure: 79   Weight: 118   Height: 61   Pulse: 74   Temperature: 97.7   Respiration rate: 16   Pulse Oxygen: 99         Encounter Diagnosis   Name Primary?     Generalized anxiety disorder         No orders of the defined types were placed in this encounter.     Medications Ordered This Encounter   Medications    clonazePAM (KLONOPIN) 1 MG tablet     Sig: Take 1 tablet (1 mg total) by mouth 2 (two) times daily.     Dispense:  60 tablet     Refill:  2        No follow-ups on file.

## 2025-06-16 ENCOUNTER — RESULTS FOLLOW-UP (OUTPATIENT)
Dept: OBSTETRICS AND GYNECOLOGY | Facility: CLINIC | Age: 45
End: 2025-06-16

## 2025-06-16 ENCOUNTER — OFFICE VISIT (OUTPATIENT)
Dept: INTERNAL MEDICINE | Facility: CLINIC | Age: 45
End: 2025-06-16
Payer: COMMERCIAL

## 2025-06-16 VITALS
BODY MASS INDEX: 23.41 KG/M2 | DIASTOLIC BLOOD PRESSURE: 83 MMHG | HEART RATE: 73 BPM | HEIGHT: 60 IN | SYSTOLIC BLOOD PRESSURE: 124 MMHG | WEIGHT: 119.25 LBS | OXYGEN SATURATION: 98 %

## 2025-06-16 DIAGNOSIS — F33.1 MODERATE EPISODE OF RECURRENT MAJOR DEPRESSIVE DISORDER: ICD-10-CM

## 2025-06-16 DIAGNOSIS — M87.019 AVASCULAR NECROSIS OF BONE OF SHOULDER: ICD-10-CM

## 2025-06-16 DIAGNOSIS — F41.1 GENERALIZED ANXIETY DISORDER: Primary | Chronic | ICD-10-CM

## 2025-06-16 PROBLEM — C43.9 MELANOMA: Status: RESOLVED | Noted: 2024-02-10 | Resolved: 2025-06-16

## 2025-06-16 PROCEDURE — 98006 SYNCH AUDIO-VIDEO EST MOD 30: CPT | Mod: 95,,, | Performed by: FAMILY MEDICINE

## 2025-06-16 PROCEDURE — 1159F MED LIST DOCD IN RCRD: CPT | Mod: CPTII,95,, | Performed by: FAMILY MEDICINE

## 2025-06-16 PROCEDURE — 3074F SYST BP LT 130 MM HG: CPT | Mod: CPTII,95,, | Performed by: FAMILY MEDICINE

## 2025-06-16 PROCEDURE — 3079F DIAST BP 80-89 MM HG: CPT | Mod: CPTII,95,, | Performed by: FAMILY MEDICINE

## 2025-06-16 RX ORDER — FLUCONAZOLE 150 MG/1
150 TABLET ORAL ONCE
Qty: 1 TABLET | Refills: 0 | Status: SHIPPED | OUTPATIENT
Start: 2025-06-16 | End: 2025-06-19

## 2025-06-16 NOTE — PROGRESS NOTES
Subjective:       Patient ID: Ivy Salgado is a 44 y.o. female.    The patient location is: [place]  The chief complaint leading to consultation is:   Chief Complaint   Patient presents with    Follow-up     Medication follow up          Visit type: audiovisual    Face to Face time with patient: 15 minutes of total time spent on the encounter, which includes face to face time and non-face to face time preparing to see the patient (eg, review of tests), Obtaining and/or reviewing separately obtained history, Documenting clinical information in the electronic or other health record, Independently interpreting results (not separately reported) and communicating results to the patient/family/caregiver, or Care coordination (not separately reported).       Each patient to whom he or she provides medical services by telemedicine is:  (1) informed of the relationship between the physician and patient and the respective role of any other health care provider with respect to management of the patient; and (2) notified that he or she may decline to receive medical services by telemedicine and may withdraw from such care at any time.    Notes:     Follow-up  Associated symptoms include arthralgias. Pertinent negatives include no chest pain, headaches, joint swelling, neck pain, vomiting or weakness.     History of Present Illness    CHIEF COMPLAINT:  Ivy presents today for 6-month follow up    CARDIOVASCULAR:  She reports heart rate fluctuations ranging from  BPM at work, with shortness of breath and occasional dizziness during exertion requiring rest periods. At rest, heart rate remains in high 40s to low 50s. Holter monitor in January showed heart rate range of 40-120s.    POST-OPERATIVE:  She underwent surgery on March 19th, 2023, experiencing increased fatigue and shortness of breath during recovery period requiring rest breaks.    MEDICAL HISTORY:  She has Crohn's disease with recent biopsy performed during  surgery. She follows with dermatology under Dr. Laura Tatum.      ROS:  General: -fever, -chills, -fatigue, -weight gain, -weight loss  Eyes: -vision changes, -redness, -discharge  ENT: -ear pain, -nasal congestion, -sore throat  Cardiovascular: -chest pain, +palpitations, -lower extremity edema, +feelings of slow heart rate, +feelings of fast heart rate, +exertional dyspnea  Respiratory: -cough, -shortness of breath  Gastrointestinal: -abdominal pain, -nausea, -vomiting, -diarrhea, -constipation, -blood in stool  Genitourinary: -dysuria, -hematuria, -frequency  Musculoskeletal: -joint pain, -muscle pain  Skin: -rash, -lesion  Neurological: -headache, +dizziness, -numbness, -tingling  Psychiatric: -anxiety, -depression, -sleep difficulty           All of your core healthy metrics are met.      Social History     Social History Narrative    Not on file       Family History   Problem Relation Name Age of Onset    Diabetes Paternal Grandfather Stephen     Hearing loss Paternal Grandmother Viviane     Cancer Maternal Grandfather Philippe         Skin    Skin cancer Maternal Grandfather Philippe     Diabetes Maternal Grandfather Philippe     Heart disease Maternal Grandfather Philippe     Colon cancer Father Milan     Cancer Father Milan         Colon    Liver cancer Father Milan     Hyperlipidemia Father Milan     Hypertension Mother Shaila     Crohn's disease Brother      Endometrial cancer Maternal Aunt      Breast cancer Maternal Cousin  41    Crohn's disease Daughter      Ovarian cancer Neg Hx      Melanoma Neg Hx       Current Medications[1]    Review of Systems   Constitutional:  Negative for activity change and unexpected weight change.   HENT:  Negative for hearing loss, rhinorrhea and trouble swallowing.    Eyes:  Negative for discharge and visual disturbance.   Respiratory:  Negative for chest tightness and wheezing.    Cardiovascular:  Positive for palpitations. Negative for chest pain.   Gastrointestinal:   Positive for diarrhea. Negative for blood in stool, constipation and vomiting.   Endocrine: Negative for polydipsia and polyuria.   Genitourinary:  Negative for difficulty urinating, dysuria, hematuria and menstrual problem.   Musculoskeletal:  Positive for arthralgias. Negative for joint swelling and neck pain.   Neurological:  Negative for weakness and headaches.   Psychiatric/Behavioral:  Negative for confusion and dysphoric mood.        Objective:   /83   Pulse 73   Ht 5' (1.524 m)   Wt 54.1 kg (119 lb 4.3 oz)   LMP 03/30/2015   SpO2 98%   BMI 23.29 kg/m²      Physical Exam  Constitutional:       General: She is not in acute distress.     Appearance: She is well-developed.   HENT:      Head: Normocephalic.   Pulmonary:      Effort: Pulmonary effort is normal. No respiratory distress.   Neurological:      Mental Status: She is alert and oriented to person, place, and time.   Psychiatric:         Behavior: Behavior normal.         Physical Exam              @resultssec@  Assessment & Plan   Assessment & Plan    IMPRESSION:   Reviewed recent labs from 10 days ago, noting hemoglobin at 11.6 and improved liver enzymes.   Heart rate fluctuations ( bpm) and associated symptoms likely related to post-op deconditioning rather than a new cardiac issue, given normal labs and previous Holter monitor results.    PLAN SUMMARY:   Ordered mammogram (due July 1st)   Recommend guided exercise or cardio in short segments to improve conditioning   Advised patient to try walks on days off and during vacation to assess heart rate response   Check in with cardiology regarding heart rate fluctuations   Follow up with dermatology for h/o melanoma    H/o melanoma   Ivy follows up with dermatology for skin-related issues.    HEART RATE FLUCTUATIONS (BRADYCARDIA AND TACHYCARDIA):   Monitored the patient's heart rate which fluctuates from 48 to 150 during the day, with lower rates at night and while resting (40s-70s)  and higher rates during exertion.   Reviewed Holter monitor results showing heart rate range of 40 to 120s with no concerning findings.   Recommend the patient check in with Dr. Bonilla regarding these fluctuations.   Advised patient to try walks on days off and during vacation to assess heart rate response and to contact the office to keep Dr. Monsalve updated on this issue.    SHORTNESS OF BREATH:   Ivy experiences dyspnea during exertion, such as walking across [address] or taking stairs.   Recommend guided exercise or cardio in short segments to improve conditioning.    DIZZINESS:   Ivy experiences dizziness when heart rate increases to 150 during exertion.    FATIGUE:   Ivy feels winded and fatigued during exertion.   Recommend guided exercise or cardio in short segments to improve conditioning.Keep me posted on progress    FOLLOW-UP:   Ordered mammogram (due July 1st).         Problem List Items Addressed This Visit          Psychiatric    Generalized anxiety disorder - Primary (Chronic)    Moderate episode of recurrent major depressive disorder    Current Assessment & Plan   Stable on current medications. Continue ambien/clonazepam.            Orthopedic    Avascular necrosis of bone of hip, left    Current Assessment & Plan   Stable. Continue follow up with ortho              Immunizations Administered on Date of Encounter - 6/16/2025       No immunizations on file.             No follow-ups on file.    This note was generated with the assistance of ambient listening technology. Verbal consent was obtained by the patient and accompanying visitor(s) for the recording of patient appointment to facilitate this note. I attest to having reviewed and edited the generated note for accuracy, though some syntax or spelling errors may persist. Please contact the author of this note for any clarification.      Disclaimer:  This note may have been prepared using voice recognition software, it may have not  been extensively proofed, as such there could be errors within the text such as sound alike errors.       [1]   Current Outpatient Medications:     acetaminophen (TYLENOL) 325 MG tablet, Take 2 tablets (650 mg total) by mouth every 6 (six) hours as needed for Temperature greater than (100.5)., Disp: , Rfl:     clonazePAM (KLONOPIN) 1 MG tablet, Take 1 tablet (1 mg total) by mouth 2 (two) times daily., Disp: 60 tablet, Rfl: 2    cyclobenzaprine (FLEXERIL) 10 MG tablet, Take 1 tablet (10 mg total) by mouth every evening as needed for muscle pain, Disp: 30 tablet, Rfl: 2    droNABinol (MARINOL) 10 MG capsule, Take 1 capsule (10 mg total) by mouth once daily., Disp: 90 capsule, Rfl: 2    estradiol 0.025 mg/24 hr 0.025 mg/24 hr, Place 1 patch onto the skin once a week. CHANGES ON MONDAYS, Disp: 4 patch, Rfl: 11    gabapentin (NEURONTIN) 300 MG capsule, Take 1 capsule (300 mg total) by mouth 2 (two) times daily., Disp: 60 capsule, Rfl: 4    ibuprofen (ADVIL,MOTRIN) 400 MG tablet, Take 400 mg by mouth daily as needed for Other. Takes approximately 3 tabs a day, Disp: , Rfl:     loperamide (IMODIUM) 1 mg/7.5 mL solution, Take 4 mg by mouth 3 (three) times daily as needed for Diarrhea., Disp: , Rfl:     loperamide (IMODIUM) 2 mg capsule, Take 2 capsules (4 mg total) by mouth 3 (three) times daily as needed for Diarrhea., Disp: 84 capsule, Rfl: 2    mirtazapine (REMERON SOL-TAB) 30 MG disintegrating tablet, Take 1 tablet (30 mg total) by mouth nightly., Disp: 90 tablet, Rfl: 2    multivitamin (THERAGRAN) per tablet, Take 1 tablet by mouth once daily., Disp: , Rfl:     nadoloL (CORGARD) 40 MG tablet, Take 1 tablet (40 mg total) by mouth once daily. Patient needs appointment before next refill., Disp: 90 tablet, Rfl: 7    pantoprazole (PROTONIX) 40 MG tablet, Take 1 tablet (40 mg total) by mouth 2 (two) times daily., Disp: 60 tablet, Rfl: 12    promethazine (PHENERGAN) 25 MG tablet, Take 0.5 tablets (12.5 mg total) by mouth  every 6 (six) hours as needed for Nausea., Disp: 30 tablet, Rfl: 5    tamsulosin (FLOMAX) 0.4 mg Cap, Take 1 capsule (0.4 mg total) by mouth once daily., Disp: 30 capsule, Rfl: 1    ursodioL (ACTIGALL) 300 mg capsule, Take 1 capsule (300 mg total) by mouth 2 (two) times daily., Disp: 60 capsule, Rfl: 11    valACYclovir (VALTREX) 500 MG tablet, Take 1 tablet (500 mg total) by mouth once daily., Disp: 90 tablet, Rfl: 0    vedolizumab (ENTYVIO) 300 mg SolR injection, Inject 300 mg into the vein every 8 weeks., Disp: , Rfl:     zolpidem (AMBIEN) 10 mg Tab, Take 1 tablet (10 mg total) by mouth every evening., Disp: 30 tablet, Rfl: 0

## 2025-06-17 ENCOUNTER — PATIENT MESSAGE (OUTPATIENT)
Dept: WOUND CARE | Facility: CLINIC | Age: 45
End: 2025-06-17
Payer: COMMERCIAL

## 2025-06-18 ENCOUNTER — OFFICE VISIT (OUTPATIENT)
Dept: WOUND CARE | Facility: CLINIC | Age: 45
End: 2025-06-18
Payer: COMMERCIAL

## 2025-06-18 DIAGNOSIS — Z93.2 ILEOSTOMY IN PLACE: Primary | ICD-10-CM

## 2025-06-18 DIAGNOSIS — L85.9 EPITHELIAL HYPERPLASIA OF SKIN: ICD-10-CM

## 2025-06-18 PROCEDURE — 99999 PR PBB SHADOW E&M-EST. PATIENT-LVL III: CPT | Mod: PBBFAC,,, | Performed by: CLINICAL NURSE SPECIALIST

## 2025-06-18 NOTE — PROGRESS NOTES
"Subjective:       Patient ID: Ivy Salgado is a 44 y.o. female.    Chief Complaint: Ileostomy and Skin Problem    This is well known pt to me and she has had ileo for 13+ years , comes for a new/recurrent   small granuloma at 7-10 oclock that is bleeding and painful, here today for treatment of this .    PMH had a recent  SBR and feels so much better.      Wound Check      Review of Systems   Constitutional:  Negative for activity change and appetite change.   HENT: Negative.     Respiratory: Negative.     Cardiovascular: Negative.    Gastrointestinal:         ILEOSTOMY   Genitourinary: Negative.    Musculoskeletal: Negative.    Skin:  Negative for wound.       Objective:      Physical Exam  Constitutional:       Appearance: She is well-developed.   Pulmonary:      Effort: Pulmonary effort is normal. No respiratory distress.   Abdominal:      General: Bowel sounds are normal.      Palpations: Abdomen is soft.   Musculoskeletal:         General: Normal range of motion.   Skin:     General: Skin is warm and dry.      Findings: No rash.         6/18/25  these small bumps recurring and this seems to be cause of short wear time,  I have noticed she wears older [employer] system and today I suggested she switch to [employer] product line    Placed convex 7/8" with 8815 thin ring   FIrst I performed Cautery to help remove and heal this thin ring of them around stoma edge   PROCEDURE:  CHEMICAL CAUTERY: Performed for hypergranulation tissue/granuloma(s) of stoma edge . The tissue was anesthetized topically with application of Lidocaine gel 2% and 5 minute wait time. The area noted was cauterized with AGNO3. The patient stated pain was 0 on 1-10 scale with this procedure.  If she likes this new system she will change her order with  Carlita at [facility]   ___________________________________________________________________________________________  1/3/23  Wears new image flat wafer 21/4 flange cut to 21 mm  with 4 mm " ring  PROCEDURE:  CHEMICAL CAUTERY: Performed for hypergranulation tissue/granuloma(s) of stoma edge that bleeds and hurts  . The tissue was anesthetized topically with application of Lidocaine gel 2% and 5 minute wait time. The area noted was cauterized with AGNO3. The patient stated pain was 1 on 1-10 scale with this procedure.    Assessment:       1. Ileostomy in place    2. Epithelial hyperplasia of skin        Plan:   Chemical cautery to the granuloma on skin that is bleeding. Then repouched with new version of NEW IMAGE   ( noted above )  I spent a total of 30 minutes on the day of the visit.  This includes face to face time and non-face to face time preparing to see the patient (eg, review of tests), obtaining and/or reviewing separately obtained history, documenting clinical information in the electronic or other health record, independently interpreting results and communicating results to the patient/family/caregiver, or care coordinator.

## 2025-06-25 ENCOUNTER — PATIENT MESSAGE (OUTPATIENT)
Dept: OBSTETRICS AND GYNECOLOGY | Facility: CLINIC | Age: 45
End: 2025-06-25
Payer: COMMERCIAL

## 2025-06-27 RX ORDER — VALACYCLOVIR HYDROCHLORIDE 500 MG/1
500 TABLET, FILM COATED ORAL DAILY
Qty: 90 TABLET | Refills: 3 | Status: SHIPPED | OUTPATIENT
Start: 2025-06-27

## 2025-06-27 NOTE — TELEPHONE ENCOUNTER
Refill Decision Note   Ivy Salgado  is requesting a refill authorization.  Brief Assessment and Rationale for Refill:  Approve     Medication Therapy Plan:         Comments:     Note composed:2:03 PM 06/27/2025

## 2025-07-10 ENCOUNTER — PATIENT MESSAGE (OUTPATIENT)
Dept: INTERNAL MEDICINE | Facility: CLINIC | Age: 45
End: 2025-07-10
Payer: COMMERCIAL

## 2025-07-10 ENCOUNTER — PATIENT MESSAGE (OUTPATIENT)
Dept: OBSTETRICS AND GYNECOLOGY | Facility: CLINIC | Age: 45
End: 2025-07-10
Payer: COMMERCIAL

## 2025-07-10 ENCOUNTER — PATIENT MESSAGE (OUTPATIENT)
Dept: SPORTS MEDICINE | Facility: CLINIC | Age: 45
End: 2025-07-10
Payer: COMMERCIAL

## 2025-07-10 ENCOUNTER — PATIENT MESSAGE (OUTPATIENT)
Dept: GASTROENTEROLOGY | Facility: CLINIC | Age: 45
End: 2025-07-10
Payer: COMMERCIAL

## 2025-07-10 DIAGNOSIS — E04.1 THYROID NODULE: Primary | ICD-10-CM

## 2025-07-10 RX ORDER — NADOLOL 40 MG/1
40 TABLET ORAL DAILY
Qty: 90 TABLET | Refills: 7 | Status: SHIPPED | OUTPATIENT
Start: 2025-07-10 | End: 2027-06-30

## 2025-07-10 NOTE — TELEPHONE ENCOUNTER
Patient would like to know if she should be having Thyroid US performed yearly for monitoring of thyroid nodule. Please advise. Thank you!      LOV with Luis Madden DO , 6/16/2025       US Soft Tissue Head Neck Thyroid performed 5/11/2023:  Dx: Thyroid nodule    Details  Narrative & Impression  EXAMINATION:  US SOFT TISSUE HEAD NECK THYROID     CLINICAL HISTORY:  .  Nontoxic single thyroid nodule     TECHNIQUE:  Ultrasound of the thyroid and cervical lymph nodes was performed.     COMPARISON:  Ultrasound 10/07/2020, 10/02/2019     FINDINGS:  The right thyroid lobe measures 4.5 x 1.2 x 1.1 cm.  The isthmus measures 0.1 cm in thickness.  The left thyroid lobe measures 4.0 x 1.3 x 0.9 cm.  Total thyroid volume equals 5 cc, atrophic.  Homogeneous background parenchymal echotexture.  Thyroid vascularity is within normal limits.     There are thyroid nodules as follows:     *0.2 cm hypoechoic nodule in the left upper pole, previously 0.3 cm.  It is difficult to tell if this is cystic or solid.  No echogenic foci.  No cervical lymphadenopathy in the visualized neck.     Impression:     Unchanged tiny hypoechoic nodule in the left upper pole, not meeting criteria for follow-up or FNA.     Electronically signed by resident: Zion Roberto  Date:                                            05/11/2023  Time:                                           10:48     Electronically signed by:Hay Garcia  Date:                                            05/11/2023  Time:                                           12:10

## 2025-07-10 NOTE — TELEPHONE ENCOUNTER
Emailed disability desk with pt's information and picture she provided, stating [facility] needs more information submitted.

## 2025-07-14 ENCOUNTER — PATIENT MESSAGE (OUTPATIENT)
Dept: GASTROENTEROLOGY | Facility: CLINIC | Age: 45
End: 2025-07-14
Payer: COMMERCIAL

## 2025-07-14 ENCOUNTER — HOSPITAL ENCOUNTER (OUTPATIENT)
Dept: RADIOLOGY | Facility: HOSPITAL | Age: 45
Discharge: HOME | End: 2025-07-14
Attending: STUDENT IN AN ORGANIZED HEALTH CARE EDUCATION/TRAINING PROGRAM
Payer: COMMERCIAL

## 2025-07-14 VITALS — HEIGHT: 60 IN | BODY MASS INDEX: 23.36 KG/M2 | WEIGHT: 119 LBS

## 2025-07-14 DIAGNOSIS — F41.9 ANXIETY: ICD-10-CM

## 2025-07-14 DIAGNOSIS — Z01.419 WELL WOMAN EXAM: ICD-10-CM

## 2025-07-14 PROCEDURE — 77067 SCR MAMMO BI INCL CAD: CPT | Mod: TC

## 2025-07-15 RX ORDER — ZOLPIDEM TARTRATE 10 MG/1
10 TABLET ORAL NIGHTLY
Qty: 30 TABLET | Refills: 2 | Status: SHIPPED | OUTPATIENT
Start: 2025-07-15

## 2025-07-15 NOTE — TELEPHONE ENCOUNTER
No care due was identified.  [facility] Embedded Care Due Messages. Reference number: [id number].   7/14/2025 7:40:01 PM CDT

## 2025-07-15 NOTE — TELEPHONE ENCOUNTER
Refill Encounter    PCP Visits: Recent Visits  Date Type Provider Dept   06/16/25 Office Visit WeeksLuis DO [facility]   11/20/24 Office Visit Weeks, Luis OCHOA DO [facility]   Showing recent visits within past 360 days and meeting all other requirements  Future Appointments  No visits were found meeting these conditions.  Showing future appointments within next 720 days and meeting all other requirements      Last 3 Blood Pressure:   BP Readings from Last 3 Encounters:   06/16/25 124/83   06/12/25 124/83   06/09/25 129/83     Preferred Pharmacy:   [facility] [address] [place] [address]  Phone: [phone] Fax: [phone]  Requested RX:  Requested Prescriptions     Pending Prescriptions Disp Refills    zolpidem (AMBIEN) 10 mg Tab 30 tablet 0     Sig: Take 1 tablet (10 mg total) by mouth every evening.      RX Route: Normal

## 2025-07-17 ENCOUNTER — TELEPHONE (OUTPATIENT)
Dept: GASTROENTEROLOGY | Facility: CLINIC | Age: 45
End: 2025-07-17
Payer: COMMERCIAL

## 2025-07-17 DIAGNOSIS — K50.018 CROHN'S DISEASE OF SMALL INTESTINE WITH OTHER COMPLICATION: Primary | ICD-10-CM

## 2025-07-17 NOTE — TELEPHONE ENCOUNTER
Patient is scheduled for a Ileoscopy through [facility] on 9/15/25 with Dr. SACHI Garcia  Referral for procedure from [facility]

## 2025-07-21 ENCOUNTER — PATIENT OUTREACH (OUTPATIENT)
Dept: ADMINISTRATIVE | Facility: HOSPITAL | Age: 45
End: 2025-07-21
Payer: COMMERCIAL

## 2025-07-21 ENCOUNTER — PATIENT MESSAGE (OUTPATIENT)
Dept: ADMINISTRATIVE | Facility: HOSPITAL | Age: 45
End: 2025-07-21
Payer: COMMERCIAL

## 2025-07-21 DIAGNOSIS — Z12.31 ENCOUNTER FOR SCREENING MAMMOGRAM FOR MALIGNANT NEOPLASM OF BREAST: Primary | ICD-10-CM

## 2025-07-21 DIAGNOSIS — M85.89 OSTEOPENIA OF MULTIPLE SITES: ICD-10-CM

## 2025-07-25 DIAGNOSIS — K50.018 CROHN'S DISEASE OF SMALL INTESTINE WITH OTHER COMPLICATION: ICD-10-CM

## 2025-07-25 RX ORDER — GABAPENTIN 300 MG/1
300 CAPSULE ORAL 2 TIMES DAILY
Qty: 60 CAPSULE | Refills: 4 | Status: CANCELLED | OUTPATIENT
Start: 2025-07-25

## 2025-07-26 DIAGNOSIS — K50.018 CROHN'S DISEASE OF SMALL INTESTINE WITH OTHER COMPLICATION: ICD-10-CM

## 2025-07-28 ENCOUNTER — TELEPHONE (OUTPATIENT)
Dept: OBSTETRICS AND GYNECOLOGY | Facility: CLINIC | Age: 45
End: 2025-07-28
Payer: COMMERCIAL

## 2025-07-28 ENCOUNTER — APPOINTMENT (OUTPATIENT)
Dept: LAB | Facility: HOSPITAL | Age: 45
End: 2025-07-28
Payer: COMMERCIAL

## 2025-07-28 ENCOUNTER — PATIENT MESSAGE (OUTPATIENT)
Dept: OBSTETRICS AND GYNECOLOGY | Facility: CLINIC | Age: 45
End: 2025-07-28
Payer: COMMERCIAL

## 2025-07-28 ENCOUNTER — PATIENT MESSAGE (OUTPATIENT)
Dept: INTERNAL MEDICINE | Facility: CLINIC | Age: 45
End: 2025-07-28
Payer: COMMERCIAL

## 2025-07-28 DIAGNOSIS — R30.0 BURNING WITH URINATION: Primary | ICD-10-CM

## 2025-07-28 DIAGNOSIS — K50.018 CROHN'S DISEASE OF SMALL INTESTINE WITH OTHER COMPLICATION: ICD-10-CM

## 2025-07-28 DIAGNOSIS — R30.0 BURNING WITH URINATION: ICD-10-CM

## 2025-07-28 PROCEDURE — 87088 URINE BACTERIA CULTURE: CPT | Performed by: FAMILY MEDICINE

## 2025-07-28 RX ORDER — GABAPENTIN 300 MG/1
300 CAPSULE ORAL 2 TIMES DAILY
Qty: 60 CAPSULE | Refills: 4 | OUTPATIENT
Start: 2025-07-28 | End: 2025-07-28 | Stop reason: CLARIF

## 2025-07-28 RX ORDER — GABAPENTIN 300 MG/1
300 CAPSULE ORAL 2 TIMES DAILY
Qty: 60 CAPSULE | Refills: 4 | OUTPATIENT
Start: 2025-07-28

## 2025-07-28 NOTE — TELEPHONE ENCOUNTER
Refill Decision Note   Ivy Salgado  is requesting a refill authorization.  Brief Assessment and Rationale for Refill:  Quick Discontinue     Medication Therapy Plan:         Comments:     Note composed:7:46 AM 07/28/2025             Appointments     Last Visit   4/8/2025 Peace Garcia MD   Next Visit   10/7/2025 Peace Garcia MD

## 2025-07-30 ENCOUNTER — TELEPHONE (OUTPATIENT)
Dept: RADIOLOGY | Facility: HOSPITAL | Age: 45
End: 2025-07-30

## 2025-07-30 ENCOUNTER — HOSPITAL ENCOUNTER (OUTPATIENT)
Dept: RADIOLOGY | Facility: HOSPITAL | Age: 45
Discharge: HOME | End: 2025-07-30
Attending: STUDENT IN AN ORGANIZED HEALTH CARE EDUCATION/TRAINING PROGRAM
Payer: COMMERCIAL

## 2025-07-30 ENCOUNTER — PATIENT MESSAGE (OUTPATIENT)
Dept: OBSTETRICS AND GYNECOLOGY | Facility: CLINIC | Age: 45
End: 2025-07-30
Payer: COMMERCIAL

## 2025-07-30 ENCOUNTER — PATIENT MESSAGE (OUTPATIENT)
Dept: GASTROENTEROLOGY | Facility: CLINIC | Age: 45
End: 2025-07-30
Payer: COMMERCIAL

## 2025-07-30 DIAGNOSIS — R92.8 ABNORMAL MAMMOGRAM: ICD-10-CM

## 2025-07-30 LAB — BACTERIA UR CULT: ABNORMAL

## 2025-07-30 PROCEDURE — 77061 BREAST TOMOSYNTHESIS UNI: CPT | Mod: TC,LT

## 2025-07-30 PROCEDURE — 76642 ULTRASOUND BREAST LIMITED: CPT | Mod: TC,LT

## 2025-07-30 PROCEDURE — 77065 DX MAMMO INCL CAD UNI: CPT | Mod: 26,LT,, | Performed by: RADIOLOGY

## 2025-07-30 PROCEDURE — 76642 ULTRASOUND BREAST LIMITED: CPT | Mod: 26,LT,, | Performed by: RADIOLOGY

## 2025-07-30 PROCEDURE — 77061 BREAST TOMOSYNTHESIS UNI: CPT | Mod: 26,LT,, | Performed by: RADIOLOGY

## 2025-07-30 NOTE — TELEPHONE ENCOUNTER
UTI , treating with macrobid   Called & spoke to pt  Current IBD meds: Entyvio 300 mg IV q 8 weeks     LD: 6/6 ND: 8/5  Symptoms:   Burning and urgency  Urine culture: >100,000 cfu/ml Gram-negative Rods   Macrobid 100mg BID x 7 days (7/30 - 8/6)   updated

## 2025-07-30 NOTE — TELEPHONE ENCOUNTER
Spoke with patient. Reviewed breast biopsy procedure and reviewed instructions for breast biopsy. Patient expressed understanding and all questions were answered. Provided patient with my phone number to call for any further concerns or questions.   Patient scheduled breast biopsy at the [facility] on 8/5/2025.

## 2025-07-31 RX ORDER — CEPHALEXIN 500 MG/1
500 CAPSULE ORAL EVERY 6 HOURS
Qty: 20 CAPSULE | Refills: 0 | Status: SHIPPED | OUTPATIENT
Start: 2025-07-31 | End: 2025-08-06

## 2025-08-04 ENCOUNTER — TELEPHONE (OUTPATIENT)
Dept: OBSTETRICS AND GYNECOLOGY | Facility: CLINIC | Age: 45
End: 2025-08-04
Payer: COMMERCIAL

## 2025-08-05 ENCOUNTER — HOSPITAL ENCOUNTER (OUTPATIENT)
Dept: RADIOLOGY | Facility: HOSPITAL | Age: 45
Discharge: HOME | End: 2025-08-05
Attending: STUDENT IN AN ORGANIZED HEALTH CARE EDUCATION/TRAINING PROGRAM
Payer: COMMERCIAL

## 2025-08-05 DIAGNOSIS — R92.8 ABNORMAL FINDING ON BREAST IMAGING: ICD-10-CM

## 2025-08-05 PROCEDURE — 27201068 US BIOPSY LYMPH NODE AXILLA

## 2025-08-05 PROCEDURE — 77065 DX MAMMO INCL CAD UNI: CPT | Mod: TC,LT

## 2025-08-05 PROCEDURE — 63600175 PHARM REV CODE 636 W HCPCS: Performed by: STUDENT IN AN ORGANIZED HEALTH CARE EDUCATION/TRAINING PROGRAM

## 2025-08-05 PROCEDURE — 88342 IMHCHEM/IMCYTCHM 1ST ANTB: CPT | Mod: TC,59 | Performed by: RADIOLOGY

## 2025-08-05 RX ORDER — LIDOCAINE HYDROCHLORIDE 20 MG/ML
10 INJECTION, SOLUTION INFILTRATION; PERINEURAL ONCE
Status: COMPLETED | OUTPATIENT
Start: 2025-08-05 | End: 2025-08-05

## 2025-08-05 RX ORDER — LIDOCAINE HYDROCHLORIDE 10 MG/ML
3 INJECTION, SOLUTION EPIDURAL; INFILTRATION; INTRACAUDAL; PERINEURAL ONCE
Status: COMPLETED | OUTPATIENT
Start: 2025-08-05 | End: 2025-08-05

## 2025-08-05 RX ORDER — LIDOCAINE HYDROCHLORIDE AND EPINEPHRINE 10; 20 UG/ML; MG/ML
10 INJECTION, SOLUTION INFILTRATION; PERINEURAL ONCE
Status: DISCONTINUED | OUTPATIENT
Start: 2025-08-05 | End: 2025-08-06 | Stop reason: HOSPADM

## 2025-08-05 RX ADMIN — LIDOCAINE HYDROCHLORIDE 10 ML: 20 INJECTION, SOLUTION INFILTRATION; PERINEURAL at 11:08

## 2025-08-05 RX ADMIN — LIDOCAINE HYDROCHLORIDE 30 MG: 10 INJECTION, SOLUTION EPIDURAL; INFILTRATION; INTRACAUDAL; PERINEURAL at 11:08

## 2025-08-07 ENCOUNTER — PATIENT MESSAGE (OUTPATIENT)
Dept: INTERNAL MEDICINE | Facility: CLINIC | Age: 45
End: 2025-08-07
Payer: COMMERCIAL

## 2025-08-07 ENCOUNTER — DOCUMENTATION ONLY (OUTPATIENT)
Dept: SURGERY | Facility: CLINIC | Age: 45
End: 2025-08-07
Payer: COMMERCIAL

## 2025-08-07 DIAGNOSIS — C50.919 BREAST CANCER: Primary | ICD-10-CM

## 2025-08-07 LAB
DHEA SERPL-MCNC: NORMAL
ESTROGEN SERPL-MCNC: NORMAL PG/ML
INSULIN SERPL-ACNC: NORMAL U[IU]/ML
LAB AP GROSS DESCRIPTION: NORMAL
LAB AP PERFORMING LOCATION(S): NORMAL
LAB AP REPORT FOOTNOTES: NORMAL
T3RU NFR SERPL: NORMAL %

## 2025-08-07 NOTE — NURSING
Called Patient with results of breast biopsy from 8/5/2025.  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 Discussed what this means and that the next step is to meet with a breast surgeon. An appt was made for 8/13/25 with Dr. Miller and Dr. Martinez. VV scheduled 8/11/25 with WAYNE Aragon and breast MRI scheduled 8/8/25.  Reviewed location of breast center. Patient verbalized understanding.  Oncology Navigation   Intake  Date of Diagnosis: 08/05/25  Cancer Type: Breast  Type of Referral: Internal  Date of Referral: 08/07/25  Initial Nurse Navigator Contact: 08/07/25  Referral to Initial Contact Timeline (days): 0  First Appointment Available: 08/11/25  Appointment Date: 08/11/25  First Available Date vs. Scheduled Date (days): 0     Treatment  Current Status: Staging work-up    Surgical Oncologist: Dr. Miller  Consult Date: 08/13/25    Medical Oncologist: Dr. Martinez  Consult Date: 08/13/25       Procedures: Biopsy; MRI  Biopsy Schedule Date: 08/08/25  MRI Schedule Date: 08/05/25       ER: Positive  NH: Positive  Her2: Postive       Support Systems: Family members     Acuity      Follow Up  No follow-ups on file.

## 2025-08-08 ENCOUNTER — HOSPITAL ENCOUNTER (OUTPATIENT)
Dept: RADIOLOGY | Facility: HOSPITAL | Age: 45
Discharge: HOME | End: 2025-08-08
Attending: NURSE PRACTITIONER
Payer: COMMERCIAL

## 2025-08-08 DIAGNOSIS — C50.919 BREAST CANCER: ICD-10-CM

## 2025-08-08 PROCEDURE — 25500020 PHARM REV CODE 255: Performed by: NURSE PRACTITIONER

## 2025-08-08 PROCEDURE — A9577 INJ MULTIHANCE: HCPCS | Performed by: NURSE PRACTITIONER

## 2025-08-08 PROCEDURE — 77049 MRI BREAST C-+ W/CAD BI: CPT | Mod: TC

## 2025-08-08 PROCEDURE — 77049 MRI BREAST C-+ W/CAD BI: CPT | Mod: 26,,, | Performed by: RADIOLOGY

## 2025-08-08 RX ADMIN — GADOBENATE DIMEGLUMINE 13 ML: 529 INJECTION, SOLUTION INTRAVENOUS at 05:08

## 2025-08-11 ENCOUNTER — OFFICE VISIT (OUTPATIENT)
Dept: SURGERY | Facility: CLINIC | Age: 45
End: 2025-08-11
Payer: COMMERCIAL

## 2025-08-11 DIAGNOSIS — Z17.0 CARCINOMA OF AXILLARY TAIL OF LEFT BREAST IN FEMALE, ESTROGEN RECEPTOR POSITIVE: Primary | ICD-10-CM

## 2025-08-11 DIAGNOSIS — C50.612 CARCINOMA OF AXILLARY TAIL OF LEFT BREAST IN FEMALE, ESTROGEN RECEPTOR POSITIVE: Primary | ICD-10-CM

## 2025-08-11 PROCEDURE — 98006 SYNCH AUDIO-VIDEO EST MOD 30: CPT | Mod: 95,,, | Performed by: NURSE PRACTITIONER

## 2025-08-11 RX ORDER — BUSPIRONE HYDROCHLORIDE 5 MG/1
5 TABLET ORAL 2 TIMES DAILY
Qty: 60 TABLET | Refills: 11 | Status: SHIPPED | OUTPATIENT
Start: 2025-08-11 | End: 2026-08-11

## 2025-08-12 ENCOUNTER — PATIENT MESSAGE (OUTPATIENT)
Dept: HEMATOLOGY/ONCOLOGY | Facility: CLINIC | Age: 45
End: 2025-08-12
Payer: COMMERCIAL

## 2025-08-12 ENCOUNTER — TELEPHONE (OUTPATIENT)
Dept: HEMATOLOGY/ONCOLOGY | Facility: CLINIC | Age: 45
End: 2025-08-12
Payer: COMMERCIAL

## 2025-08-13 ENCOUNTER — TELEPHONE (OUTPATIENT)
Dept: CARDIOLOGY | Facility: CLINIC | Age: 45
End: 2025-08-13
Payer: COMMERCIAL

## 2025-08-13 ENCOUNTER — DOCUMENTATION ONLY (OUTPATIENT)
Dept: SURGERY | Facility: CLINIC | Age: 45
End: 2025-08-13
Payer: COMMERCIAL

## 2025-08-13 ENCOUNTER — PATIENT MESSAGE (OUTPATIENT)
Dept: HEMATOLOGY/ONCOLOGY | Facility: CLINIC | Age: 45
End: 2025-08-13
Payer: COMMERCIAL

## 2025-08-13 ENCOUNTER — TELEPHONE (OUTPATIENT)
Dept: PLASTIC SURGERY | Facility: CLINIC | Age: 45
End: 2025-08-13
Payer: COMMERCIAL

## 2025-08-13 ENCOUNTER — OFFICE VISIT (OUTPATIENT)
Dept: HEMATOLOGY/ONCOLOGY | Facility: CLINIC | Age: 45
End: 2025-08-13
Payer: COMMERCIAL

## 2025-08-13 ENCOUNTER — TELEPHONE (OUTPATIENT)
Dept: PSYCHIATRY | Facility: CLINIC | Age: 45
End: 2025-08-13
Payer: COMMERCIAL

## 2025-08-13 ENCOUNTER — OFFICE VISIT (OUTPATIENT)
Dept: SURGERY | Facility: CLINIC | Age: 45
End: 2025-08-13
Payer: COMMERCIAL

## 2025-08-13 VITALS
BODY MASS INDEX: 24.54 KG/M2 | HEIGHT: 60 IN | HEART RATE: 74 BPM | SYSTOLIC BLOOD PRESSURE: 125 MMHG | DIASTOLIC BLOOD PRESSURE: 85 MMHG | WEIGHT: 125 LBS

## 2025-08-13 VITALS
HEIGHT: 60 IN | BODY MASS INDEX: 24.54 KG/M2 | WEIGHT: 125 LBS | HEART RATE: 74 BPM | DIASTOLIC BLOOD PRESSURE: 85 MMHG | SYSTOLIC BLOOD PRESSURE: 125 MMHG

## 2025-08-13 DIAGNOSIS — Z17.0 CARCINOMA OF AXILLARY TAIL OF LEFT BREAST IN FEMALE, ESTROGEN RECEPTOR POSITIVE: Primary | ICD-10-CM

## 2025-08-13 DIAGNOSIS — C50.612 CARCINOMA OF AXILLARY TAIL OF LEFT BREAST IN FEMALE, ESTROGEN RECEPTOR POSITIVE: Primary | ICD-10-CM

## 2025-08-13 DIAGNOSIS — C50.612 MALIGNANT NEOPLASM OF AXILLARY TAIL OF LEFT BREAST: Primary | ICD-10-CM

## 2025-08-13 DIAGNOSIS — I45.81 LONG Q-T SYNDROME: ICD-10-CM

## 2025-08-13 PROCEDURE — 99999 PR PBB SHADOW E&M-EST. PATIENT-LVL IV: CPT | Mod: PBBFAC,,, | Performed by: SURGERY

## 2025-08-13 PROCEDURE — 3079F DIAST BP 80-89 MM HG: CPT | Mod: CPTII,S$GLB,, | Performed by: INTERNAL MEDICINE

## 2025-08-13 PROCEDURE — 99205 OFFICE O/P NEW HI 60 MIN: CPT | Mod: S$GLB,,, | Performed by: INTERNAL MEDICINE

## 2025-08-13 PROCEDURE — G2211 COMPLEX E/M VISIT ADD ON: HCPCS | Mod: S$GLB,,, | Performed by: INTERNAL MEDICINE

## 2025-08-13 PROCEDURE — 3008F BODY MASS INDEX DOCD: CPT | Mod: CPTII,S$GLB,, | Performed by: INTERNAL MEDICINE

## 2025-08-13 PROCEDURE — 3074F SYST BP LT 130 MM HG: CPT | Mod: CPTII,S$GLB,, | Performed by: INTERNAL MEDICINE

## 2025-08-13 PROCEDURE — 99999 PR PBB SHADOW E&M-EST. PATIENT-LVL III: CPT | Mod: PBBFAC,,, | Performed by: INTERNAL MEDICINE

## 2025-08-14 ENCOUNTER — TELEPHONE (OUTPATIENT)
Dept: HEMATOLOGY/ONCOLOGY | Facility: CLINIC | Age: 45
End: 2025-08-14
Payer: COMMERCIAL

## 2025-08-14 ENCOUNTER — TELEPHONE (OUTPATIENT)
Dept: PSYCHIATRY | Facility: CLINIC | Age: 45
End: 2025-08-14
Payer: COMMERCIAL

## 2025-08-14 ENCOUNTER — PATIENT MESSAGE (OUTPATIENT)
Dept: HEMATOLOGY/ONCOLOGY | Facility: CLINIC | Age: 45
End: 2025-08-14
Payer: COMMERCIAL

## 2025-08-14 DIAGNOSIS — K50.018 CROHN'S DISEASE OF SMALL INTESTINE WITH OTHER COMPLICATION: ICD-10-CM

## 2025-08-14 DIAGNOSIS — M25.50 JOINT PAIN: ICD-10-CM

## 2025-08-14 DIAGNOSIS — C50.612 MALIGNANT NEOPLASM OF AXILLARY TAIL OF LEFT BREAST: Primary | ICD-10-CM

## 2025-08-14 DIAGNOSIS — N95.1 MENOPAUSAL SYMPTOMS: ICD-10-CM

## 2025-08-14 DIAGNOSIS — F41.1 GENERALIZED ANXIETY DISORDER: Chronic | ICD-10-CM

## 2025-08-14 DIAGNOSIS — R11.0 NAUSEA: ICD-10-CM

## 2025-08-15 ENCOUNTER — PATIENT MESSAGE (OUTPATIENT)
Dept: SURGERY | Facility: CLINIC | Age: 45
End: 2025-08-15
Payer: COMMERCIAL

## 2025-08-15 ENCOUNTER — PATIENT MESSAGE (OUTPATIENT)
Dept: HEMATOLOGY/ONCOLOGY | Facility: CLINIC | Age: 45
End: 2025-08-15
Payer: COMMERCIAL

## 2025-08-18 ENCOUNTER — OFFICE VISIT (OUTPATIENT)
Dept: INTERNAL MEDICINE | Facility: CLINIC | Age: 45
End: 2025-08-18
Payer: COMMERCIAL

## 2025-08-18 ENCOUNTER — PATIENT MESSAGE (OUTPATIENT)
Dept: HEMATOLOGY/ONCOLOGY | Facility: CLINIC | Age: 45
End: 2025-08-18
Payer: COMMERCIAL

## 2025-08-18 ENCOUNTER — PATIENT MESSAGE (OUTPATIENT)
Dept: GASTROENTEROLOGY | Facility: CLINIC | Age: 45
End: 2025-08-18
Payer: COMMERCIAL

## 2025-08-18 ENCOUNTER — TELEPHONE (OUTPATIENT)
Dept: HEMATOLOGY/ONCOLOGY | Facility: CLINIC | Age: 45
End: 2025-08-18
Payer: COMMERCIAL

## 2025-08-18 VITALS
HEART RATE: 74 BPM | DIASTOLIC BLOOD PRESSURE: 85 MMHG | WEIGHT: 125 LBS | HEIGHT: 60 IN | BODY MASS INDEX: 24.54 KG/M2 | OXYGEN SATURATION: 99 % | SYSTOLIC BLOOD PRESSURE: 125 MMHG

## 2025-08-18 DIAGNOSIS — F41.1 GENERALIZED ANXIETY DISORDER: Primary | Chronic | ICD-10-CM

## 2025-08-18 DIAGNOSIS — Z87.898 HISTORY OF PROLONGED Q-T INTERVAL ON ECG: ICD-10-CM

## 2025-08-18 DIAGNOSIS — Z95.828 PORT-A-CATH IN PLACE: ICD-10-CM

## 2025-08-18 DIAGNOSIS — K50.018 CROHN'S DISEASE OF SMALL INTESTINE WITH OTHER COMPLICATION: ICD-10-CM

## 2025-08-18 PROCEDURE — 3074F SYST BP LT 130 MM HG: CPT | Mod: CPTII,95,, | Performed by: FAMILY MEDICINE

## 2025-08-18 PROCEDURE — 3079F DIAST BP 80-89 MM HG: CPT | Mod: CPTII,95,, | Performed by: FAMILY MEDICINE

## 2025-08-18 PROCEDURE — 98006 SYNCH AUDIO-VIDEO EST MOD 30: CPT | Mod: 95,,, | Performed by: FAMILY MEDICINE

## 2025-08-18 PROCEDURE — 1159F MED LIST DOCD IN RCRD: CPT | Mod: CPTII,95,, | Performed by: FAMILY MEDICINE

## 2025-08-19 ENCOUNTER — PATIENT MESSAGE (OUTPATIENT)
Dept: HEMATOLOGY/ONCOLOGY | Facility: CLINIC | Age: 45
End: 2025-08-19
Payer: COMMERCIAL

## 2025-08-19 ENCOUNTER — OFFICE VISIT (OUTPATIENT)
Dept: HEMATOLOGY/ONCOLOGY | Facility: CLINIC | Age: 45
End: 2025-08-19
Payer: COMMERCIAL

## 2025-08-19 DIAGNOSIS — Z71.83 ENCOUNTER FOR NONPROCREATIVE GENETIC COUNSELING: Primary | ICD-10-CM

## 2025-08-19 DIAGNOSIS — Z80.0 FAMILY HISTORY OF COLON CANCER: ICD-10-CM

## 2025-08-19 DIAGNOSIS — Z80.0 FAMILY HISTORY OF PANCREATIC CANCER: ICD-10-CM

## 2025-08-19 DIAGNOSIS — K86.9 PANCREATIC LESION: ICD-10-CM

## 2025-08-19 DIAGNOSIS — Z17.0 MALIGNANT NEOPLASM OF LEFT BREAST IN FEMALE, ESTROGEN RECEPTOR POSITIVE, UNSPECIFIED SITE OF BREAST: ICD-10-CM

## 2025-08-19 DIAGNOSIS — C50.912 MALIGNANT NEOPLASM OF LEFT BREAST IN FEMALE, ESTROGEN RECEPTOR POSITIVE, UNSPECIFIED SITE OF BREAST: ICD-10-CM

## 2025-08-19 PROCEDURE — 98007 SYNCH AUDIO-VIDEO EST HI 40: CPT | Mod: 95,,, | Performed by: NURSE PRACTITIONER

## 2025-08-19 PROCEDURE — 99417 PROLNG OP E/M EACH 15 MIN: CPT | Mod: 95,,, | Performed by: NURSE PRACTITIONER

## 2025-08-20 ENCOUNTER — PATIENT MESSAGE (OUTPATIENT)
Dept: HEMATOLOGY/ONCOLOGY | Facility: CLINIC | Age: 45
End: 2025-08-20
Payer: COMMERCIAL

## 2025-08-20 ENCOUNTER — CLINICAL SUPPORT (OUTPATIENT)
Dept: REHABILITATION | Facility: HOSPITAL | Age: 45
End: 2025-08-20
Payer: COMMERCIAL

## 2025-08-20 DIAGNOSIS — F43.29 STRESS AND ADJUSTMENT REACTION: Primary | ICD-10-CM

## 2025-08-20 DIAGNOSIS — R53.81 PHYSICAL DECONDITIONING: ICD-10-CM

## 2025-08-20 DIAGNOSIS — G47.9 SLEEP DIFFICULTIES: ICD-10-CM

## 2025-08-20 PROCEDURE — 97165 OT EVAL LOW COMPLEX 30 MIN: CPT

## 2025-08-20 PROCEDURE — 97535 SELF CARE MNGMENT TRAINING: CPT

## 2025-08-20 PROCEDURE — 97110 THERAPEUTIC EXERCISES: CPT

## 2025-08-21 ENCOUNTER — PATIENT MESSAGE (OUTPATIENT)
Dept: INTERNAL MEDICINE | Facility: CLINIC | Age: 45
End: 2025-08-21
Payer: COMMERCIAL

## 2025-08-21 DIAGNOSIS — F41.1 GENERALIZED ANXIETY DISORDER: Primary | ICD-10-CM

## 2025-08-21 DIAGNOSIS — Z87.898 HISTORY OF PROLONGED Q-T INTERVAL ON ECG: ICD-10-CM

## 2025-08-22 DIAGNOSIS — C50.919 MALIGNANT NEOPLASM OF FEMALE BREAST, UNSPECIFIED ESTROGEN RECEPTOR STATUS, UNSPECIFIED LATERALITY, UNSPECIFIED SITE OF BREAST: Primary | ICD-10-CM

## 2025-08-25 ENCOUNTER — E-CONSULT (OUTPATIENT)
Dept: PHARMACY | Facility: CLINIC | Age: 45
End: 2025-08-25
Payer: COMMERCIAL

## 2025-08-25 ENCOUNTER — TELEPHONE (OUTPATIENT)
Dept: HEMATOLOGY/ONCOLOGY | Facility: CLINIC | Age: 45
End: 2025-08-25
Payer: COMMERCIAL

## 2025-08-25 DIAGNOSIS — Z87.898 HISTORY OF PROLONGED Q-T INTERVAL ON ECG: Primary | ICD-10-CM

## 2025-08-26 ENCOUNTER — OFFICE VISIT (OUTPATIENT)
Dept: HEMATOLOGY/ONCOLOGY | Facility: CLINIC | Age: 45
End: 2025-08-26
Payer: COMMERCIAL

## 2025-08-26 ENCOUNTER — OFFICE VISIT (OUTPATIENT)
Dept: PLASTIC SURGERY | Facility: CLINIC | Age: 45
End: 2025-08-26
Payer: COMMERCIAL

## 2025-08-26 ENCOUNTER — TELEPHONE (OUTPATIENT)
Dept: HEMATOLOGY/ONCOLOGY | Facility: CLINIC | Age: 45
End: 2025-08-26
Payer: COMMERCIAL

## 2025-08-26 VITALS
SYSTOLIC BLOOD PRESSURE: 120 MMHG | BODY MASS INDEX: 23.81 KG/M2 | HEIGHT: 60 IN | DIASTOLIC BLOOD PRESSURE: 80 MMHG | WEIGHT: 121.25 LBS | HEART RATE: 55 BPM

## 2025-08-26 DIAGNOSIS — F43.29 STRESS AND ADJUSTMENT REACTION: Primary | ICD-10-CM

## 2025-08-26 DIAGNOSIS — G47.00 INSOMNIA, UNSPECIFIED TYPE: ICD-10-CM

## 2025-08-26 DIAGNOSIS — C50.919 MALIGNANT NEOPLASM OF FEMALE BREAST, UNSPECIFIED ESTROGEN RECEPTOR STATUS, UNSPECIFIED LATERALITY, UNSPECIFIED SITE OF BREAST: Primary | ICD-10-CM

## 2025-08-26 DIAGNOSIS — N95.1 HOT FLASHES DUE TO MENOPAUSE: ICD-10-CM

## 2025-08-26 DIAGNOSIS — C50.919 MALIGNANT NEOPLASM OF FEMALE BREAST, UNSPECIFIED ESTROGEN RECEPTOR STATUS, UNSPECIFIED LATERALITY, UNSPECIFIED SITE OF BREAST: ICD-10-CM

## 2025-08-26 DIAGNOSIS — F41.1 GENERALIZED ANXIETY DISORDER: Chronic | ICD-10-CM

## 2025-08-26 PROCEDURE — 3074F SYST BP LT 130 MM HG: CPT | Mod: CPTII,S$GLB,, | Performed by: SURGERY

## 2025-08-26 PROCEDURE — 3008F BODY MASS INDEX DOCD: CPT | Mod: CPTII,S$GLB,, | Performed by: SURGERY

## 2025-08-26 PROCEDURE — 1159F MED LIST DOCD IN RCRD: CPT | Mod: CPTII,95,, | Performed by: NURSE PRACTITIONER

## 2025-08-26 PROCEDURE — 99999 PR PBB SHADOW E&M-EST. PATIENT-LVL IV: CPT | Mod: PBBFAC,,, | Performed by: SURGERY

## 2025-08-26 PROCEDURE — 99204 OFFICE O/P NEW MOD 45 MIN: CPT | Mod: S$GLB,,, | Performed by: SURGERY

## 2025-08-26 PROCEDURE — 98007 SYNCH AUDIO-VIDEO EST HI 40: CPT | Mod: 95,,, | Performed by: NURSE PRACTITIONER

## 2025-08-26 PROCEDURE — 3079F DIAST BP 80-89 MM HG: CPT | Mod: CPTII,S$GLB,, | Performed by: SURGERY

## 2025-08-26 PROCEDURE — 1160F RVW MEDS BY RX/DR IN RCRD: CPT | Mod: CPTII,95,, | Performed by: NURSE PRACTITIONER

## 2025-08-28 ENCOUNTER — TELEPHONE (OUTPATIENT)
Dept: HEMATOLOGY/ONCOLOGY | Facility: CLINIC | Age: 45
End: 2025-08-28
Payer: COMMERCIAL

## 2025-08-28 ENCOUNTER — PATIENT MESSAGE (OUTPATIENT)
Dept: PLASTIC SURGERY | Facility: CLINIC | Age: 45
End: 2025-08-28
Payer: COMMERCIAL

## 2025-08-29 ENCOUNTER — TELEPHONE (OUTPATIENT)
Dept: GASTROENTEROLOGY | Facility: CLINIC | Age: 45
End: 2025-08-29
Payer: COMMERCIAL

## 2025-08-31 ENCOUNTER — PATIENT MESSAGE (OUTPATIENT)
Dept: INTERNAL MEDICINE | Facility: CLINIC | Age: 45
End: 2025-08-31
Payer: COMMERCIAL

## 2025-08-31 DIAGNOSIS — K50.018 CROHN'S DISEASE OF SMALL INTESTINE WITH OTHER COMPLICATION: ICD-10-CM

## 2025-09-02 ENCOUNTER — PATIENT MESSAGE (OUTPATIENT)
Dept: HEMATOLOGY/ONCOLOGY | Facility: CLINIC | Age: 45
End: 2025-09-02
Payer: COMMERCIAL

## 2025-09-02 DIAGNOSIS — Z17.0 MALIGNANT NEOPLASM OF LEFT BREAST IN FEMALE, ESTROGEN RECEPTOR POSITIVE, UNSPECIFIED SITE OF BREAST: Primary | ICD-10-CM

## 2025-09-02 DIAGNOSIS — F43.29 STRESS AND ADJUSTMENT REACTION: ICD-10-CM

## 2025-09-02 DIAGNOSIS — C50.912 MALIGNANT NEOPLASM OF LEFT BREAST IN FEMALE, ESTROGEN RECEPTOR POSITIVE, UNSPECIFIED SITE OF BREAST: Primary | ICD-10-CM

## 2025-09-02 DIAGNOSIS — Z80.0 FAMILY HISTORY OF COLON CANCER: ICD-10-CM

## 2025-09-02 DIAGNOSIS — Z80.0 FAMILY HISTORY OF PANCREATIC CANCER: ICD-10-CM

## 2025-09-03 ENCOUNTER — DOCUMENTATION ONLY (OUTPATIENT)
Dept: HEMATOLOGY/ONCOLOGY | Facility: CLINIC | Age: 45
End: 2025-09-03
Payer: COMMERCIAL

## 2025-09-03 RX ORDER — GABAPENTIN 300 MG/1
300 CAPSULE ORAL 2 TIMES DAILY
Qty: 60 CAPSULE | Refills: 4 | Status: SHIPPED | OUTPATIENT
Start: 2025-09-03

## 2025-09-05 ENCOUNTER — ANESTHESIA EVENT (OUTPATIENT)
Dept: SURGERY | Facility: HOSPITAL | Age: 45
End: 2025-09-05
Payer: COMMERCIAL

## 2025-09-05 ENCOUNTER — ANESTHESIA (OUTPATIENT)
Dept: SURGERY | Facility: HOSPITAL | Age: 45
End: 2025-09-05
Payer: COMMERCIAL

## 2025-09-05 PROBLEM — Z85.3 PERSONAL HISTORY OF BREAST CANCER: Status: ACTIVE | Noted: 2025-09-05

## 2025-09-05 PROBLEM — C50.919 MALIGNANT NEOPLASM OF FEMALE BREAST: Status: ACTIVE | Noted: 2025-09-05

## 2025-09-05 PROCEDURE — 25000003 PHARM REV CODE 250: Performed by: NURSE ANESTHETIST, CERTIFIED REGISTERED

## 2025-09-05 PROCEDURE — 63600175 PHARM REV CODE 636 W HCPCS: Performed by: NURSE ANESTHETIST, CERTIFIED REGISTERED

## 2025-09-05 PROCEDURE — 27200651 HC AIRWAY, LMA: Performed by: ANESTHESIOLOGY

## 2025-09-05 PROCEDURE — 63600175 PHARM REV CODE 636 W HCPCS: Performed by: PHYSICIAN ASSISTANT

## 2025-09-05 RX ORDER — HYDROMORPHONE HYDROCHLORIDE 1 MG/ML
INJECTION, SOLUTION INTRAMUSCULAR; INTRAVENOUS; SUBCUTANEOUS
Status: DISCONTINUED | OUTPATIENT
Start: 2025-09-05 | End: 2025-09-05

## 2025-09-05 RX ORDER — MIDAZOLAM HYDROCHLORIDE 1 MG/ML
INJECTION INTRAMUSCULAR; INTRAVENOUS
Status: DISCONTINUED | OUTPATIENT
Start: 2025-09-05 | End: 2025-09-05

## 2025-09-05 RX ORDER — ROCURONIUM BROMIDE 10 MG/ML
INJECTION, SOLUTION INTRAVENOUS
Status: DISCONTINUED | OUTPATIENT
Start: 2025-09-05 | End: 2025-09-05

## 2025-09-05 RX ORDER — PHENYLEPHRINE HYDROCHLORIDE 10 MG/ML
INJECTION INTRAVENOUS
Status: DISCONTINUED | OUTPATIENT
Start: 2025-09-05 | End: 2025-09-05

## 2025-09-05 RX ORDER — LIDOCAINE HYDROCHLORIDE 20 MG/ML
INJECTION INTRAVENOUS
Status: DISCONTINUED | OUTPATIENT
Start: 2025-09-05 | End: 2025-09-05

## 2025-09-05 RX ORDER — EPHEDRINE SULFATE 50 MG/ML
INJECTION, SOLUTION INTRAVENOUS
Status: DISCONTINUED | OUTPATIENT
Start: 2025-09-05 | End: 2025-09-05

## 2025-09-05 RX ORDER — FAMOTIDINE 10 MG/ML
INJECTION, SOLUTION INTRAVENOUS
Status: DISCONTINUED | OUTPATIENT
Start: 2025-09-05 | End: 2025-09-05

## 2025-09-05 RX ORDER — ACETAMINOPHEN 10 MG/ML
INJECTION, SOLUTION INTRAVENOUS
Status: DISCONTINUED | OUTPATIENT
Start: 2025-09-05 | End: 2025-09-05

## 2025-09-05 RX ORDER — FENTANYL CITRATE 50 UG/ML
INJECTION, SOLUTION INTRAMUSCULAR; INTRAVENOUS
Status: DISCONTINUED | OUTPATIENT
Start: 2025-09-05 | End: 2025-09-05

## 2025-09-05 RX ORDER — DEXAMETHASONE SODIUM PHOSPHATE 4 MG/ML
INJECTION, SOLUTION INTRA-ARTICULAR; INTRALESIONAL; INTRAMUSCULAR; INTRAVENOUS; SOFT TISSUE
Status: DISCONTINUED | OUTPATIENT
Start: 2025-09-05 | End: 2025-09-05

## 2025-09-05 RX ORDER — DEXMEDETOMIDINE HYDROCHLORIDE 100 UG/ML
INJECTION, SOLUTION INTRAVENOUS
Status: DISCONTINUED | OUTPATIENT
Start: 2025-09-05 | End: 2025-09-05

## 2025-09-05 RX ORDER — PROPOFOL 10 MG/ML
VIAL (ML) INTRAVENOUS
Status: DISCONTINUED | OUTPATIENT
Start: 2025-09-05 | End: 2025-09-05

## 2025-09-05 RX ADMIN — CEFAZOLIN 2 G: 2 INJECTION, POWDER, FOR SOLUTION INTRAMUSCULAR; INTRAVENOUS at 04:09

## 2025-09-05 RX ADMIN — PHENYLEPHRINE HYDROCHLORIDE 100 MCG: 10 INJECTION INTRAVENOUS at 05:09

## 2025-09-05 RX ADMIN — PROPOFOL 20 MG: 10 INJECTION, EMULSION INTRAVENOUS at 05:09

## 2025-09-05 RX ADMIN — HYDROMORPHONE HYDROCHLORIDE 0.2 MG: 1 INJECTION, SOLUTION INTRAMUSCULAR; INTRAVENOUS; SUBCUTANEOUS at 02:09

## 2025-09-05 RX ADMIN — PHENYLEPHRINE HYDROCHLORIDE 50 MCG: 10 INJECTION INTRAVENOUS at 04:09

## 2025-09-05 RX ADMIN — LIDOCAINE HYDROCHLORIDE 50 MG: 20 INJECTION INTRAVENOUS at 12:09

## 2025-09-05 RX ADMIN — PROPOFOL 20 MG: 10 INJECTION, EMULSION INTRAVENOUS at 06:09

## 2025-09-05 RX ADMIN — PHENYLEPHRINE HYDROCHLORIDE 100 MCG: 10 INJECTION INTRAVENOUS at 03:09

## 2025-09-05 RX ADMIN — ACETAMINOPHEN 1000 MG: 10 INJECTION INTRAVENOUS at 04:09

## 2025-09-05 RX ADMIN — PHENYLEPHRINE HYDROCHLORIDE 100 MCG: 10 INJECTION INTRAVENOUS at 04:09

## 2025-09-05 RX ADMIN — EPHEDRINE SULFATE 5 MG: 50 INJECTION INTRAVENOUS at 05:09

## 2025-09-05 RX ADMIN — FAMOTIDINE 20 MG: 10 INJECTION, SOLUTION INTRAVENOUS at 12:09

## 2025-09-05 RX ADMIN — DEXMEDETOMIDINE HYDROCHLORIDE 8 MCG: 100 INJECTION, SOLUTION INTRAVENOUS at 12:09

## 2025-09-05 RX ADMIN — SUGAMMADEX 150 MG: 100 INJECTION, SOLUTION INTRAVENOUS at 05:09

## 2025-09-05 RX ADMIN — GLYCOPYRROLATE 0.2 MG: 0.2 INJECTION, SOLUTION INTRAMUSCULAR; INTRAVENOUS at 03:09

## 2025-09-05 RX ADMIN — SODIUM CHLORIDE, SODIUM GLUCONATE, SODIUM ACETATE, POTASSIUM CHLORIDE, MAGNESIUM CHLORIDE, SODIUM PHOSPHATE, DIBASIC, AND POTASSIUM PHOSPHATE: .53; .5; .37; .037; .03; .012; .00082 INJECTION, SOLUTION INTRAVENOUS at 01:09

## 2025-09-05 RX ADMIN — ROCURONIUM BROMIDE 25 MG: 10 INJECTION INTRAVENOUS at 03:09

## 2025-09-05 RX ADMIN — FENTANYL CITRATE 25 MCG: 50 INJECTION, SOLUTION INTRAMUSCULAR; INTRAVENOUS at 12:09

## 2025-09-05 RX ADMIN — DEXAMETHASONE SODIUM PHOSPHATE 12 MG: 4 INJECTION INTRA-ARTICULAR; INTRALESIONAL; INTRAMUSCULAR; INTRAVENOUS; SOFT TISSUE at 12:09

## 2025-09-05 RX ADMIN — MIDAZOLAM HYDROCHLORIDE 2 MG: 1 INJECTION, SOLUTION INTRAMUSCULAR; INTRAVENOUS at 12:09

## 2025-09-05 RX ADMIN — PROPOFOL 160 MG: 10 INJECTION, EMULSION INTRAVENOUS at 12:09

## 2025-09-05 RX ADMIN — FENTANYL CITRATE 75 MCG: 50 INJECTION, SOLUTION INTRAMUSCULAR; INTRAVENOUS at 12:09

## 2025-09-05 RX ADMIN — EPHEDRINE SULFATE 10 MG: 50 INJECTION INTRAVENOUS at 05:09

## 2025-09-05 RX ADMIN — HYDROMORPHONE HYDROCHLORIDE 0.2 MG: 1 INJECTION, SOLUTION INTRAMUSCULAR; INTRAVENOUS; SUBCUTANEOUS at 04:09

## 2025-09-05 RX ADMIN — CEFAZOLIN 2 G: 2 INJECTION, POWDER, FOR SOLUTION INTRAMUSCULAR; INTRAVENOUS at 12:09

## 2025-09-05 RX ADMIN — FENTANYL CITRATE 25 MCG: 50 INJECTION, SOLUTION INTRAMUSCULAR; INTRAVENOUS at 02:09

## 2025-09-05 RX ADMIN — HYDROMORPHONE HYDROCHLORIDE 0.2 MG: 1 INJECTION, SOLUTION INTRAMUSCULAR; INTRAVENOUS; SUBCUTANEOUS at 01:09

## 2025-09-05 RX ADMIN — EPHEDRINE SULFATE 5 MG: 50 INJECTION INTRAVENOUS at 04:09

## 2025-09-05 RX ADMIN — PHENYLEPHRINE HYDROCHLORIDE 50 MCG: 10 INJECTION INTRAVENOUS at 03:09

## 2025-09-05 RX ADMIN — ROCURONIUM BROMIDE 10 MG: 10 INJECTION INTRAVENOUS at 04:09

## 2025-09-05 RX ADMIN — FENTANYL CITRATE 25 MCG: 50 INJECTION, SOLUTION INTRAMUSCULAR; INTRAVENOUS at 01:09

## 2025-09-05 RX ADMIN — FENTANYL CITRATE 50 MCG: 50 INJECTION, SOLUTION INTRAMUSCULAR; INTRAVENOUS at 01:09

## 2025-09-05 RX ADMIN — HYDROMORPHONE HYDROCHLORIDE 0.3 MG: 1 INJECTION, SOLUTION INTRAMUSCULAR; INTRAVENOUS; SUBCUTANEOUS at 06:09

## 2025-09-05 RX ADMIN — SODIUM CHLORIDE: 0.9 INJECTION, SOLUTION INTRAVENOUS at 12:09

## 2025-09-05 RX ADMIN — HYDROMORPHONE HYDROCHLORIDE 0.1 MG: 1 INJECTION, SOLUTION INTRAMUSCULAR; INTRAVENOUS; SUBCUTANEOUS at 05:09

## 2025-09-05 RX ADMIN — SODIUM CHLORIDE: 0.9 INJECTION, SOLUTION INTRAVENOUS at 03:09

## (undated) DEVICE — TRAY FOLEY 16FR INFECTION CONT

## (undated) DEVICE — ELECTRODE REM PLYHSV RETURN 9

## (undated) DEVICE — LUBRICANT SURGILUBE 2 OZ

## (undated) DEVICE — SUT CTD VICRYL 0 VIL BR/CT

## (undated) DEVICE — CONTAINER SPECIMEN OR STER 4OZ

## (undated) DEVICE — NDL 20GX1-1/2IN IB

## (undated) DEVICE — DRAPE TIBURON 36X104X123IN

## (undated) DEVICE — SYR 10CC LUER LOCK

## (undated) DEVICE — TRAY CATH 1-LYR URIMTR 16FR

## (undated) DEVICE — ADHESIVE DERMABOND ADVANCED

## (undated) DEVICE — TUBING HEATED INSUFFLATOR

## (undated) DEVICE — SYR 30CC LUER LOCK

## (undated) DEVICE — CUTTER PROXIMATE BLUE 75MM

## (undated) DEVICE — DRAPE THREE-QTR REINF 53X77IN

## (undated) DEVICE — BLADE SURG CARBON STEEL SZ11

## (undated) DEVICE — DRAPE C-ARM ELAS CLIP 42X120IN

## (undated) DEVICE — SET CEMENT (SCULP)

## (undated) DEVICE — DRESSING ABSRBNT ISLAND 3.6X8

## (undated) DEVICE — WRAP SHLDR HIP ACCU THRM PACK

## (undated) DEVICE — KIT VUETIP TROCAR SWAB

## (undated) DEVICE — LOOP VESSEL YELLOW MAXI

## (undated) DEVICE — PAD DERMAPROX THCK 11X15X1IN

## (undated) DEVICE — GLOVE BIOGEL 7.5

## (undated) DEVICE — SYR ONLY LUER LOCK 20CC

## (undated) DEVICE — CUP MEDICINE GRADUATED 1OZ

## (undated) DEVICE — SYR IRRIGATION BULB STER 60ML

## (undated) DEVICE — SEE MEDLINE ITEM 154981

## (undated) DEVICE — PAD ELECTRODE STER 1.5X3

## (undated) DEVICE — MARKER SKIN RULER STERILE

## (undated) DEVICE — COVER MAYO STND XL 30X57IN

## (undated) DEVICE — DRAPE TOP 53X102IN

## (undated) DEVICE — NDL BOX COUNTER

## (undated) DEVICE — DRAPE STERI U-SHAPED 47X51IN

## (undated) DEVICE — DRAPE INCISE IOBAN 2 23X17IN

## (undated) DEVICE — HOOD T7 W/ PEEL AWAY LENS

## (undated) DEVICE — TRAY CYSTO BASIN OMC

## (undated) DEVICE — TOWEL OR DISP STRL BLUE 4/PK

## (undated) DEVICE — SALINE .45% 1000ML VITEK2

## (undated) DEVICE — PACK CYSTOSCOPY III SIRUS

## (undated) DEVICE — SHEARS HARMONIC 5CM 36CM

## (undated) DEVICE — TRAY MINOR GEN SURG

## (undated) DEVICE — SUT VICRYL 3-0 27 SH

## (undated) DEVICE — DRAPE C ARM 42 X 120 10/BX

## (undated) DEVICE — WIRE GUIDE 0.038OLD

## (undated) DEVICE — SET IRR URLGY 2LINE UNIV SPIKE

## (undated) DEVICE — PAD ABDOMINAL STERILE 8X10IN

## (undated) DEVICE — GOWN X-LG STERILE BACK

## (undated) DEVICE — GUIDE WIRE MOTION .035 X 150CM

## (undated) DEVICE — KIT ANTIFOG

## (undated) DEVICE — GOWN SMARTGOWN LVL4 X-LONG XL

## (undated) DEVICE — SOL IRR NACL .9% 3000ML

## (undated) DEVICE — SOL IRR WATER STRL 3000 ML

## (undated) DEVICE — FIBER MOSES 200 DFL

## (undated) DEVICE — SEE MEDLINE ITEM 157148

## (undated) DEVICE — SUT VICRYL PLUS 4-0 PS2 27

## (undated) DEVICE — TRAY MINOR GEN SURG OMC

## (undated) DEVICE — GOWN SURGICAL X-LARGE

## (undated) DEVICE — COVER MAYO STAND REINFRCD 30

## (undated) DEVICE — EXTRACTOR TIPLESS 2.4FRX1115CM

## (undated) DEVICE — DRESSING ANTIMICROBIAL 1 INCH

## (undated) DEVICE — SEE MEDLINE ITEM 157181

## (undated) DEVICE — SYR DISP LL 5CC

## (undated) DEVICE — SUT VICRYL PLUS 0 CT1 18IN

## (undated) DEVICE — KIT TOTAL HIP OPTIVAC

## (undated) DEVICE — SEE MEDLINE ITEM 156902

## (undated) DEVICE — PENCIL ROCKER SWITCH 10FT CORD

## (undated) DEVICE — COVER TABLE HVY DTY 60X90IN

## (undated) DEVICE — GAUZE SPONGE 4X4 12PLY

## (undated) DEVICE — SEE MEDLINE ITEM 157128

## (undated) DEVICE — KIT ANTIFOG W/SPONG & FLUID

## (undated) DEVICE — SUT MCRYL PLUS 4-0 PS2 27IN

## (undated) DEVICE — TUBING HF INSUFFLATION W/ FLTR

## (undated) DEVICE — GLOVE BIOGEL ECLIPSE SZ 8

## (undated) DEVICE — GLOVE SURGICAL LATEX SZ 8

## (undated) DEVICE — DRAPE STERI INSTRUMENT 1018

## (undated) DEVICE — DRAPE ABDOMINAL TIBURON 14X11

## (undated) DEVICE — Device

## (undated) DEVICE — BLADE SAW SGTL NARROW 0.89

## (undated) DEVICE — UNDERGLOVES BIOGEL PI SIZE 7.5

## (undated) DEVICE — APPLICATOR CHLORAPREP ORN 26ML

## (undated) DEVICE — TRAY CYSTO BASIN

## (undated) DEVICE — BRACE SHOULDER SLINGSHOT 3 XLG

## (undated) DEVICE — DRAPE ORTH SPLIT 77X108IN

## (undated) DEVICE — PACK CYSTO

## (undated) DEVICE — SUT 2-0 12-18IN SILK

## (undated) DEVICE — SUT 0 VICRYL / UR6 (J603)

## (undated) DEVICE — CLIPPER BLADE MOD 4406 (CAREF)

## (undated) DEVICE — SUPPORT SLING SHOT II MEDIUM

## (undated) DEVICE — BAG URO DRAIN

## (undated) DEVICE — GLOVE SURGEON SYN PF SZ 9

## (undated) DEVICE — TROCAR ENDOPATH XCEL 5MM 7.5CM

## (undated) DEVICE — PAD PINK TRENDELENBURG POS XL

## (undated) DEVICE — TUBING SUC UNIV W/CONN 12FT

## (undated) DEVICE — WARMER DRAPE STERILE LF

## (undated) DEVICE — YANKAUER OPEN TIP W/O VENT

## (undated) DEVICE — MIXER BONE CEMENT

## (undated) DEVICE — GOWN SMART IMP BREATHABLE XXLG

## (undated) DEVICE — SOL NACL 0.9% INJ PF/50151

## (undated) DEVICE — SUT VICRYL CTD 2-0 GI 27 SH

## (undated) DEVICE — GLOVE BIOGEL SKINSENSE PI 7.0

## (undated) DEVICE — BOWL STERILE LARGE 32OZ

## (undated) DEVICE — DRAPE U SPLIT SHEET 54X76IN

## (undated) DEVICE — DRAPE T THYROID STERILE

## (undated) DEVICE — TAPE SURG MEDIPORE 6X72IN

## (undated) DEVICE — SPONGE LAP 18X18 PREWASHED

## (undated) DEVICE — TIP YANKAUERS BULB NO VENT

## (undated) DEVICE — GLOVE BIOGEL PI MICRO INDIC 6

## (undated) DEVICE — LEGGINGS 48X31 INCH

## (undated) DEVICE — SUT 1 48IN PDS II VIO MONO

## (undated) DEVICE — TROCAR ENDOPATH XCEL 5X75MM

## (undated) DEVICE — PULSAVAC ZIMMER

## (undated) DEVICE — NDL MAYO CAT 1/2 CIR #6

## (undated) DEVICE — SOL NACL IRR 1000ML BTL

## (undated) DEVICE — UNDERGLOVES BIOGEL PI SZ 7 LF

## (undated) DEVICE — GAUZE PACKING VAG XRAY 2X6

## (undated) DEVICE — SEE MEDLINE ITEM 157131

## (undated) DEVICE — SUT MONOCRYL 4-0 PS-2

## (undated) DEVICE — SUT PROLENE 2-0 30 SH

## (undated) DEVICE — TRAY GENERAL SURGERY OMC

## (undated) DEVICE — SUT 0 54IN COATED VICRYL U

## (undated) DEVICE — DRAPE THYROID WITH ARMBOARD

## (undated) DEVICE — RELOAD PROXIMATE CUT BLUE 75MM

## (undated) DEVICE — SEE MEDLINE ITEM 152622

## (undated) DEVICE — SUT 4-0 VICRYL / SH

## (undated) DEVICE — ELECTRODE MEGADYNE RETURN DUAL

## (undated) DEVICE — COVER LIGHT HANDLE 80/CA

## (undated) DEVICE — ADHESIVE MASTISOL VIAL 48/BX

## (undated) DEVICE — BAG URINE DRAIN LATEX FREE

## (undated) DEVICE — NDL 22GA X1 1/2 REG BEVEL

## (undated) DEVICE — SOL NACL IRR 3000ML

## (undated) DEVICE — DRAPE CORETEMP FLD WRM 56X62IN

## (undated) DEVICE — DERMABOND SKIN ADHESIVE PROPEN

## (undated) DEVICE — CATH IV INTROCAN 14G X 2.

## (undated) DEVICE — SUT 3-0 VICRYL SH CR/8 18

## (undated) DEVICE — ELECTRODE EXTENDED BLADE

## (undated) DEVICE — SEE MEDLINE ITEM 146417

## (undated) DEVICE — DECANTER FLUID TRNSF WHITE 9IN

## (undated) DEVICE — TIP FEMORAL IRRIGATION BRUSH

## (undated) DEVICE — SUT 0 18IN COATED VICRYL V

## (undated) DEVICE — GLOVE ORTHO PF SZ 8.5

## (undated) DEVICE — SEALER LIGASURE LAP 37CM 5MM

## (undated) DEVICE — DRESSING XEROFORM NONADH 1X8IN

## (undated) DEVICE — SUT BLU BR 2 TAPERD NDL 1/2

## (undated) DEVICE — SET CYSTO IRRIGATION UNIV SPIK

## (undated) DEVICE — CLOSURE SKIN STERI STRIP 1/2X4

## (undated) DEVICE — CHLORAPREP TNT2%SOL10.5ML TEAL

## (undated) DEVICE — SUT CTD VICRYL 0 UND BR CT

## (undated) DEVICE — DRESSING MEPORE PRO 6 X 7 CM

## (undated) DEVICE — NDL HYPO STD REG BVL 22GX1.5IN

## (undated) DEVICE — SUT 4/0 18IN COATED VICRYL

## (undated) DEVICE — SEE L#120831

## (undated) DEVICE — NDL HYPO REG 25G X 1 1/2

## (undated) DEVICE — CATH POLLACK OPEN-END FLEXI-TI

## (undated) DEVICE — TROCAR ENDOPATH XCEL 11MM 10CM

## (undated) DEVICE — STRIP MEDI WND CLSR 1/2X4IN